# Patient Record
Sex: MALE | Race: WHITE | NOT HISPANIC OR LATINO | Employment: OTHER | URBAN - METROPOLITAN AREA
[De-identification: names, ages, dates, MRNs, and addresses within clinical notes are randomized per-mention and may not be internally consistent; named-entity substitution may affect disease eponyms.]

---

## 2017-01-02 ENCOUNTER — MYC REFILL (OUTPATIENT)
Dept: FAMILY MEDICINE | Facility: CLINIC | Age: 42
End: 2017-01-02

## 2017-01-02 ENCOUNTER — OFFICE VISIT (OUTPATIENT)
Dept: FAMILY MEDICINE | Facility: CLINIC | Age: 42
End: 2017-01-02
Payer: COMMERCIAL

## 2017-01-02 VITALS
TEMPERATURE: 97.7 F | DIASTOLIC BLOOD PRESSURE: 80 MMHG | WEIGHT: 293.8 LBS | HEART RATE: 70 BPM | HEIGHT: 77 IN | RESPIRATION RATE: 16 BRPM | SYSTOLIC BLOOD PRESSURE: 116 MMHG | OXYGEN SATURATION: 100 % | BODY MASS INDEX: 34.69 KG/M2

## 2017-01-02 DIAGNOSIS — E78.2 MIXED HYPERLIPIDEMIA: ICD-10-CM

## 2017-01-02 DIAGNOSIS — E78.5 HYPERLIPIDEMIA LDL GOAL <100: ICD-10-CM

## 2017-01-02 DIAGNOSIS — K57.32 DIVERTICULITIS OF COLON: Primary | ICD-10-CM

## 2017-01-02 DIAGNOSIS — J01.00 ACUTE NON-RECURRENT MAXILLARY SINUSITIS: ICD-10-CM

## 2017-01-02 DIAGNOSIS — R11.0 NAUSEA: ICD-10-CM

## 2017-01-02 PROCEDURE — 99214 OFFICE O/P EST MOD 30 MIN: CPT | Performed by: FAMILY MEDICINE

## 2017-01-02 RX ORDER — ATORVASTATIN CALCIUM 40 MG/1
40 TABLET, FILM COATED ORAL DAILY
Qty: 90 TABLET | Refills: 1 | Status: CANCELLED | OUTPATIENT
Start: 2017-01-02

## 2017-01-02 RX ORDER — FENOFIBRATE 145 MG/1
145 TABLET, COATED ORAL DAILY
Qty: 90 TABLET | Refills: 1 | Status: SHIPPED | OUTPATIENT
Start: 2017-01-02 | End: 2017-07-07

## 2017-01-02 RX ORDER — LEVOFLOXACIN 750 MG/1
750 TABLET, FILM COATED ORAL DAILY
Qty: 10 TABLET | Refills: 0 | Status: SHIPPED | OUTPATIENT
Start: 2017-01-02 | End: 2017-02-02

## 2017-01-02 RX ORDER — ATORVASTATIN CALCIUM 40 MG/1
40 TABLET, FILM COATED ORAL DAILY
Qty: 90 TABLET | Refills: 1 | Status: SHIPPED | OUTPATIENT
Start: 2017-01-02 | End: 2017-07-27

## 2017-01-02 RX ORDER — FENOFIBRATE 145 MG/1
145 TABLET, COATED ORAL DAILY
Qty: 30 TABLET | Refills: 3 | Status: CANCELLED | OUTPATIENT
Start: 2017-01-02

## 2017-01-02 RX ORDER — ONDANSETRON 8 MG/1
TABLET, FILM COATED ORAL
Qty: 20 TABLET | Refills: 5 | Status: SHIPPED | OUTPATIENT
Start: 2017-01-02 | End: 2018-01-11

## 2017-01-02 RX ORDER — METRONIDAZOLE 500 MG/1
500 TABLET ORAL 2 TIMES DAILY
Qty: 20 TABLET | Refills: 0 | Status: SHIPPED | OUTPATIENT
Start: 2017-01-02 | End: 2017-02-02

## 2017-01-02 ASSESSMENT — PAIN SCALES - GENERAL: PAINLEVEL: MODERATE PAIN (4)

## 2017-01-02 NOTE — NURSING NOTE
"Chief Complaint   Patient presents with     Sinus Problem     Abdominal Pain     LL quad       Initial /80 mmHg  Pulse 70  Temp(Src) 97.7  F (36.5  C) (Oral)  Resp 16  Ht 1.956 m (6' 5\")  Wt 133.267 kg (293 lb 12.8 oz)  BMI 34.83 kg/m2  SpO2 100% Estimated body mass index is 34.83 kg/(m^2) as calculated from the following:    Height as of this encounter: 1.956 m (6' 5\").    Weight as of this encounter: 133.267 kg (293 lb 12.8 oz).  BP completed using cuff size: tom Laboy MA      "

## 2017-01-02 NOTE — PROGRESS NOTES
SUBJECTIVE:                                                    Joel Pineda is a 41 year old male who presents to clinic today for the following health issues:      ABDOMINAL PAIN     Onset: 5-6 days     Description:   Character: Stabbing and Cramping  Location: left lower quadrant  Radiation: None    Intensity: 4/10    Progression of Symptoms:  worsening    Accompanying Signs & Symptoms:  Fever/Chills?: YES - chills, sweats  Gas/Bloating: YES- gas  Nausea: YES  Vomitting: no   Diarrhea?: YES- loose stools  Constipation:no  - did have a couple episodes  Dysuria or Hematuria: no    History:   Trauma: no   Previous similar pain: YES   Previous tests done: x-ray, CT and Colonoscopy    Precipitating factors:   Does the pain change with:     Food: no      BM: no     Urination: no     Alleviating factors:  Oxycodone, Tylenol    Therapies Tried and outcome: see above - helped    LMP:  not applicable     Acute Illness   Acute illness concerns: sinus  Onset: 3 days     Fever: no    Chills/Sweats: YES    Headache (location?): YES    Sinus Pressure:YES    Conjunctivitis:  no    Ear Pain: YES- popping, bilateral    Rhinorrhea: YES    Congestion: YES    Sore Throat: no     Cough: YES - slight    Wheeze: YES    Decreased Appetite: no    Nausea: YES    Vomiting: no    Diarrhea:  YES    Dysuria/Freq.: no    Fatigue/Achiness: YES    Sick/Strep Exposure: YES- sister     Therapies Tried and outcome: Mucinex, nasal spray    SUBJECTIVE:  Here today with symptoms as above. Has a history of recurrent diverticulitis and thinks that has flared up. He's having some LLQ achiness and some slight chills.  Stools have been normal. Appetite has been normal. But in the last few days he is also come down with some sinus symptoms including pressure and thick postnasal drip. In the past we've used Augmentin on both sinus and diverticular issues with the last couple times he has taken it is felt a bit like his throat is tightening.  No hives. No  "true and if lactic reaction. Also needs refills of some baseline medications. Is scheduled for lab tests tomorrow    Review of systems otherwise negative.  Past medical, family, and social history reviewed and updated in chart.    OBJECTIVE:  /80 mmHg  Pulse 70  Temp(Src) 97.7  F (36.5  C) (Oral)  Resp 16  Ht 1.956 m (6' 5\")  Wt 133.267 kg (293 lb 12.8 oz)  BMI 34.83 kg/m2  SpO2 100%  Alert, pleasant, upbeat, and in no apparent discomfort.  Ears normal. Throat and pharynx normal. Neck supple. No adenopathy or masses in the neck or supraclavicular regions. Sinuses non tender.  S1 and S2 normal, no murmurs, clicks, gallops or rubs. Regular rate and rhythm. Chest is clear; no wheezes or rales. No edema or JVD.  Abdomen - soft throughout with a little bit of tenderness but no significant rebound the left lower quadrant  Past labs reviewed with the patient.     ASSESSMENT / PLAN:  (K57.32) Diverticulitis of colon  (primary encounter diagnosis)  Comment: Discussed mechanism of action of the proposed medication, as well as potential effects, both good and bad.  Patient expressed understanding and agreed with treatment. He is willing to try the Levaquin. Has had some dizziness and muscle aches in the past but does not sound like a true allergy  Plan: levofloxacin (LEVAQUIN) 750 MG tablet,         metroNIDAZOLE (FLAGYL) 500 MG tablet            (J01.00) Acute non-recurrent maxillary sinusitis  Comment: As above  Plan: levofloxacin (LEVAQUIN) 750 MG tablet            (E78.2) Mixed hyperlipidemia  Comment: refilled   Plan: fenofibrate 145 MG tablet, atorvastatin         (LIPITOR) 40 MG tablet, Comprehensive metabolic        panel, Lipid panel reflex to direct LDL            (R11.0) Nausea  Comment: refilled   Plan: ondansetron (ZOFRAN) 8 MG tablet            Follow up based on results  JA Lyles MD    (Chart documentation completed in part with Dragon voice-recognition software.  Even though reviewed " some grammatical, spelling, and word errors may remain.)

## 2017-01-02 NOTE — TELEPHONE ENCOUNTER
Message from Kal:  Cheryl Johnson MA Mon Jan 2, 2017 11:30 AM        ----- Message -----   From: Joel Pineda   Sent: 1/2/2017 11:25 AM   To: Mg Raji [place]  Subject: Medication Renewal Request     Original authorizing provider: MD Joel Dickey would like a refill of the following medications:  atorvastatin (LIPITOR) 40 MG tablet [Ksenia Lyles MD]  fenofibrate 145 MG tablet [Ksenia Lyles MD]    Preferred pharmacy: [id number] [address] [address]    Comment:  Good morning. While the two selected meds are running low, I do not know if we should wait to refill until after the scheduled blood test that will check my triglycerides, etc. Note I do see Dr. Lyles today @ 3:20pm wherein we could discuss further. As an aside, my insurance coverage changed to [employer] on 01/01/17...I will provide the new insurance info to [facility], the preferred pharmacy.

## 2017-01-03 ENCOUNTER — TELEPHONE (OUTPATIENT)
Dept: FAMILY MEDICINE | Facility: CLINIC | Age: 42
End: 2017-01-03

## 2017-01-03 ENCOUNTER — CARE COORDINATION (OUTPATIENT)
Dept: CARE COORDINATION | Facility: CLINIC | Age: 42
End: 2017-01-03

## 2017-01-03 DIAGNOSIS — E78.2 MIXED HYPERLIPIDEMIA: ICD-10-CM

## 2017-01-03 LAB
ALBUMIN SERPL-MCNC: 4.2 G/DL (ref 3.4–5)
ALP SERPL-CCNC: 66 U/L (ref 40–150)
ALT SERPL W P-5'-P-CCNC: 27 U/L (ref 0–70)
ANION GAP SERPL CALCULATED.3IONS-SCNC: 7 MMOL/L (ref 3–14)
AST SERPL W P-5'-P-CCNC: 18 U/L (ref 0–45)
BILIRUB SERPL-MCNC: 0.3 MG/DL (ref 0.2–1.3)
BUN SERPL-MCNC: 13 MG/DL (ref 7–30)
CALCIUM SERPL-MCNC: 9.2 MG/DL (ref 8.5–10.1)
CHLORIDE SERPL-SCNC: 105 MMOL/L (ref 94–109)
CHOLEST SERPL-MCNC: 153 MG/DL
CO2 SERPL-SCNC: 27 MMOL/L (ref 20–32)
CREAT SERPL-MCNC: 1.32 MG/DL (ref 0.66–1.25)
GFR SERPL CREATININE-BSD FRML MDRD: 59 ML/MIN/1.7M2
GLUCOSE SERPL-MCNC: 99 MG/DL (ref 70–99)
HDLC SERPL-MCNC: 31 MG/DL
LDLC SERPL CALC-MCNC: 44 MG/DL
NONHDLC SERPL-MCNC: 122 MG/DL
POTASSIUM SERPL-SCNC: 4 MMOL/L (ref 3.4–5.3)
PROT SERPL-MCNC: 7.2 G/DL (ref 6.8–8.8)
SODIUM SERPL-SCNC: 139 MMOL/L (ref 133–144)
TRIGL SERPL-MCNC: 388 MG/DL

## 2017-01-03 PROCEDURE — 80053 COMPREHEN METABOLIC PANEL: CPT | Performed by: FAMILY MEDICINE

## 2017-01-03 PROCEDURE — 36415 COLL VENOUS BLD VENIPUNCTURE: CPT | Performed by: FAMILY MEDICINE

## 2017-01-03 PROCEDURE — 80061 LIPID PANEL: CPT | Performed by: FAMILY MEDICINE

## 2017-01-03 NOTE — TELEPHONE ENCOUNTER
levothyroxine     Last Written Prescription Date: 9/27/16  Last Quantity: 90, # refills: 2  Last Office Visit with [facility], [facility] or [facility] prescribing provider: 1/2/17          TSH   Date Value Ref Range Status   11/01/2016 2.76 0.40 - 4.00 mU/L Final     Routing refill request to provider for review/approval because:  Labs not current:  TSH  Diane Tuttle RN

## 2017-01-03 NOTE — TELEPHONE ENCOUNTER
Reason for Call:  Patient had 1/2 appointment with Dr. Lyles and was prescribed metroNIDAZOLE (FLAGYL) 500 MG tablet  And levothyroxine (SYNTHROID, LEVOTHROID) 75 MCG tablet  Since starting medications he is constipated.    Detailed comments: Please call patient to advise.     Phone Number Patient can be reached at: Home number on file [phone] (home)    Best Time: anytime    Can we leave a detailed message on this number? YES     Thank you,    Call taken on 1/3/2017 at 3:33 PM by Magali Borrego

## 2017-01-03 NOTE — PROGRESS NOTES
1/3/2017 Clinic Care Coordination Contact  [facility]/Voicemail- Social Work follow up    Referral Source: PCP  Clinical Data: Care Coordinator Outreach  Outreach attempted x 1 as a follow up to our last conversation.  Left message on voicemail with call back information and requested return call.    Plan: Care Coordinator will try to reach patient again in 7-10  business days.    Juliette Cavazos [facility]  , Clinic Care Coordination  Clinics:  [place], [place]  [phone]   1/3/2017   9:05 AM

## 2017-01-09 ENCOUNTER — TELEPHONE (OUTPATIENT)
Dept: PHARMACY | Facility: CLINIC | Age: 42
End: 2017-01-09

## 2017-01-09 DIAGNOSIS — I10 ESSENTIAL HYPERTENSION WITH GOAL BLOOD PRESSURE LESS THAN 140/90: Primary | Chronic | ICD-10-CM

## 2017-01-09 NOTE — TELEPHONE ENCOUNTER
Patient called to discuss his kidney function tests as his creatnine was elevated. Patient was fasting at the time of his lab work and he has not had any issues in the past with his kidney function. Recommend patient have kidney function retested in the next 4-6 weeks not fasting and evaluate kidney function at that time. Patient was agreeable with the plan. Orders placed for future BMP.  Radha Mcdonald, Pharm.D, BCACP  Medication Therapy Management Pharmacist

## 2017-01-19 ENCOUNTER — MYC REFILL (OUTPATIENT)
Dept: FAMILY MEDICINE | Facility: CLINIC | Age: 42
End: 2017-01-19

## 2017-01-19 DIAGNOSIS — I10 ESSENTIAL HYPERTENSION, BENIGN: ICD-10-CM

## 2017-01-19 DIAGNOSIS — I10 ESSENTIAL HYPERTENSION WITH GOAL BLOOD PRESSURE LESS THAN 140/90: Primary | Chronic | ICD-10-CM

## 2017-01-19 DIAGNOSIS — M54.50 CHRONIC MIDLINE LOW BACK PAIN WITHOUT SCIATICA: Primary | ICD-10-CM

## 2017-01-19 DIAGNOSIS — G89.29 CHRONIC MIDLINE LOW BACK PAIN WITHOUT SCIATICA: Primary | ICD-10-CM

## 2017-01-19 RX ORDER — ATENOLOL 50 MG/1
TABLET ORAL
Qty: 90 TABLET | Refills: 1 | Status: SHIPPED | OUTPATIENT
Start: 2017-01-19 | End: 2017-07-28

## 2017-01-19 RX ORDER — PREGABALIN 150 MG/1
150 CAPSULE ORAL 2 TIMES DAILY
Qty: 180 CAPSULE | Refills: 1 | Status: SHIPPED | OUTPATIENT
Start: 2017-01-19 | End: 2017-04-17

## 2017-01-19 RX ORDER — ATENOLOL 50 MG/1
50 TABLET ORAL DAILY
Qty: 90 TABLET | Refills: 1 | OUTPATIENT
Start: 2017-01-19

## 2017-01-19 NOTE — TELEPHONE ENCOUNTER
atenolol (TENORMIN) 50 MG tablet      Last Written Prescription Date: 7/20/16  Last Fill Quantity: 90, # refills: 1  Last Office Visit with [facility], [facility] or [facility] prescribing provider: 1/2/17       POTASSIUM   Date Value Ref Range Status   01/03/2017 4.0 3.4 - 5.3 mmol/L Final     CREATININE   Date Value Ref Range Status   01/03/2017 1.32* 0.66 - 1.25 mg/dL Final     BP Readings from Last 3 Encounters:   01/02/17 116/80   12/07/16 115/79   11/23/16 138/85

## 2017-01-19 NOTE — TELEPHONE ENCOUNTER
Atenolol  Prescription approved per [facility] Refill Protocol.  Keyana Mercedes RN

## 2017-01-19 NOTE — TELEPHONE ENCOUNTER
Message from [place]:  Cheryl Johnson MA Thu Jan 19, 2017 10:36 AM        ----- Message -----   From: Joel Pineda   Sent: 1/19/2017 10:30 AM   To: Mg Raji [place]  Subject: Medication Renewal Request     Original authorizing provider: MD Joel Dickey would like a refill of the following medications:  atenolol (TENORMIN) 50 MG tablet [Ksenia Lyles MD]    Preferred pharmacy: [facility] [id number] [facility] [address] [address]    Comment:  Good morning. Just a heads-up that I'm out of refills on Atenolol 50mg and Lyrica 150mg (I've contacted [facility], too). As a reminder, I've decreased dosage of Lyrica to 150mg BID (please prescribe a qty 60 capsules). I am no longer using the 225mg capsules. Thank you.    Medication renewals requested in this message routed to other providers:  pregabalin (LYRICA) 150 MG capsule [Faith Camacho PA-C]

## 2017-01-19 NOTE — TELEPHONE ENCOUNTER
pregabalin      Last Written Prescription Date: 11/25/16  Last Fill Quantity: 90,  # refills: 0   Last Office Visit with [facility], [facility] or [facility] prescribing provider: 1/2/17    Routing refill request to provider for review/approval because:  Drug not on the [facility] refill protocol   Keyana Mercedes RN

## 2017-01-19 NOTE — TELEPHONE ENCOUNTER
Prescription approved per [facility] Refill Protocol.  Keyana Mercedes RN

## 2017-01-20 ENCOUNTER — CARE COORDINATION (OUTPATIENT)
Dept: CARE COORDINATION | Facility: CLINIC | Age: 42
End: 2017-01-20

## 2017-01-20 ENCOUNTER — TELEPHONE (OUTPATIENT)
Dept: PHARMACY | Facility: OTHER | Age: 42
End: 2017-01-20

## 2017-01-20 NOTE — Clinical Note
Health Care Home - Access Care Plan    About Me  Patient Name:  Joel Lee    YOB: 1975  Age:                            41 year old   Gianna MRN:         [id number] Telephone Information:     Home Phone [phone]   Mobile [phone]       Address:    [address] [place] [address] Email address:  [email or web address]      Emergency Contact(s)  Name Relationship Lgl Grd Work Phone Home Phone Mobile Phone   1. HARJINDER HANEY Mother No  [phone]   2. FARIBA HANEY parent No  [phone]   3. PEREZ LEE Father No  [phone]             Health Maintenance:      My Access Plan  Medical Emergency 911   Questions or concerns during clinic hours Primary Clinic Line, I will call the clinic directly: Primary Clinic: [facility]- [phone]   24 Hour Appointment Line [phone] or  [phone] [place] ([phone])  (toll free)   24 Hour Nurse Line [phone] (toll free)   Questions or concerns outside clinic hours 24 Hour Appointment Line, I will call the after-hours on-call line:   [facility] [phone] or [phone]) (toll-free)   Preferred Urgent Care Preferred Urgent Care: Other (Kendy Quach )   Preferred Hospital Preferred Hospital: [facility]  [phone]   Preferred Pharmacy [facility] [address] [address] [employer] [facility] [place] [facility] [address] [facility]     Behavioral Health Crisis Line Crisis Connection, [phone] or 911     My Care Team Members  Patient Care Team       Relationship Specialty Notifications Start End    Ksenia Lyles MD PCP - General Family Practice  6/7/16     Phone: [phone] Fax: [phone]         [facility] [address] [facility] [address]    Elaine Diaz MD Referring Physician Pain Clinic  8/10/15     Comment:  Referring to Ortho    Phone: [phone] Fax: [phone]          PAIN  MANAGEMENT [address] [facility] [address]    Evan Maldonado MD MD Orthopedics  8/10/15     Phone: [phone] Fax: [phone]          PHYSICIANS [address] [facility] [address]    Houston Koroma MD MD Internal Medicine  10/1/15     Phone: [phone] Fax: [phone]         [facility] [address] [facility] [address]    Grant Angel MD MD Psychiatry  10/1/15     Phone: [phone] Fax: [phone]         PSYCHIATRIC RECOVERY [address] [facility] [address] [facility] [address]    Radha Mcdonald, [facility] Pharmacist Pharmacy  10/1/15     Phone: [phone] Fax: [phone]         [facility] [address] [place] [address]    Nish Ang MD Referring Physician Neurosurgery  10/6/15     Comment:  referring to neuropsych    Phone: [phone] Fax: [phone]         [facility] [address] [facility] [id number] [facility] [address]    Judy Garcia, LP   Psychology  12/29/15     Phone: [phone] Fax: [phone]         [facility] [address]  [facility] [address]    Checo Kiran, RN Nurse Coordinator Nurse  4/6/16     Phone: [phone]         Dalila Cavazos LSW    6/23/16     Comment:  Juliette [phone]    Liliana MATHUR Psych Psychiatrist   8/24/16     Comment:  VERNA Cortez     Phone: [phone]         Dr Daja CHING Therapist   8/24/16     Comment:  VERNA [place]ez    Phone: [phone]             My Medical and Care Information  Problem List   Patient Active Problem List   Diagnosis     Major depressive disorder, recurrent episode (H)     Intermittent asthma     Chronic back pain     Hyperlipidemia LDL goal <130     Hypertension goal BP (blood pressure) < 140/90     Chronic nonallergic rhinitis     History of diverticulitis of colon     Obesity (BMI 30-39.9)     Health Care Home     PE (pulmonary embolism)     SI (sacroiliac) joint dysfunction      Diverticulosis     GERD (gastroesophageal reflux disease)     Anxiety     Chronic pain     LLOYD (obstructive sleep apnea)- mild (AHI 11)     Intracranial arachnoid cyst     Facet arthritis of cervical region (H)     Acquired hypothyroidism     Bipolar 2 disorder (H)     Allergic rhinitis, unspecified allergic rhinitis type     Chronic midline low back pain without sciatica     Irritable bowel syndrome with diarrhea     B12 deficiency     Impaired glucose tolerance     Essential hypertension with goal blood pressure less than 140/90     Pain in joint involving ankle and foot, left     Psychophysiological insomnia     Mixed hyperlipidemia      Current Medications and Allergies:  See printed Medication Report

## 2017-01-20 NOTE — PROGRESS NOTES
1/20/2017 Clinic Care Coordination Contact  [facility]/Voicemail - Social Work    Referral Source: PCP  Clinical Data: Care Coordinator Outreach  Outreach attempted x 2 as a follow up .  Left message on [employer] with call back information and requested return call.  Plan: Care Coordinator will mail out care coordination Unable to Contact  letter with care coordinator  contact information and explanation of care coordination services. Care Coordinator will do no further outreaches at this time. Will plan to close f no response by   2/15/17.   Juliette Cavazos [facility]  , Clinic Care Coordination  Clinics:  [place],  [place], [place]  [phone]   1/20/2017   3:53 PM

## 2017-01-20 NOTE — Clinical Note
[address] [id number] [phone]    January 20, 2017      Joel Pineda  [address] [place] [address]    Dear Joel,  I am the Clinic Care Coordinator that works with your primary care provider's clinic. I recently tried to call and was unable to reach you. Below is a description of what Clinic Care Coordination is and how I can further assist you.     The Clinic Care Coordinator role is a Registered Nurse and/or  who understands the health care system. The goal of Clinic Care Coordination is to help you manage your health and improve access to the [facility] in the most efficient manner.  The Registered Nurse can assist you in meeting your health care goals by providing education, coordinating services, and strengthening the communication among your providers. The  can assist you with financial, behavioral, psychosocial, and chemical dependency and counseling/psychiatric resources.    Please feel free to keep this letter and contact information to contact me at [phone] with any further questions or concerns that may arise. We at [place] are focused on providing you with the highest-quality healthcare experience possible and that all starts with you.       Sincerely,       Juliette Cavazos [facility] Services  , Clinic Care Coordination  Clinics:  [place],  [place], [place], [place]  [phone]   1/20/2017   3:56 PM    Enclosed: I have enclosed a copy of a 24 Hour Access Plan. This has helpful phone numbers for you to call when needed. Please keep this in an easy to access place to use as needed.

## 2017-01-20 NOTE — TELEPHONE ENCOUNTER
MTM referral from: Cx report    MTM referral outreach attempt #1 on January 20, 2017 at 12:54 PM      Outcome: Left Message    Nury Rodríguez MTM Coordinator

## 2017-01-24 ENCOUNTER — TELEPHONE (OUTPATIENT)
Dept: PHARMACY | Facility: CLINIC | Age: 42
End: 2017-01-24

## 2017-01-24 ENCOUNTER — CARE COORDINATION (OUTPATIENT)
Dept: CARE COORDINATION | Facility: CLINIC | Age: 42
End: 2017-01-24

## 2017-01-24 NOTE — TELEPHONE ENCOUNTER
Patient called to ask about DDI between Geodon and seroquel. His psychiatrist is making changes to his medications because his hallucinations have been increasing and because Vraylar and Emsam are >$300.  Patient has been titrating off of Vraylar; currently taking 3mg daily, down from 4.5mg daily. Patient is off of Emsam for about 1 week. Then starting (Vraylar dose will be at 1.5mg daily) Geodon 40mg bid and on Day 2 increasing to 80mg bid. In another week patient will start fluoxetine 10mg daily. Seroquel XR 100mg at night (dose decrease from 200mg), and continue seroquel 25mg as needed for anxiety. Patient stopped seroquel on his own and is having some sweating so is using 25mg as needed to get through these episodes of withdrawal. Patient stopped the seroquel on his own because he is concerned about the drug interaction between seroquel and geodon causing QTc prolongation.  The use of geodon and seroquel is contraindicated. Advised patient to call psychiatrist to make them aware that he stopped his seroquel.     Patient did have a job interview yesterday and this lifted his spirits. So far he is tolerating the medication changes with no worsening of mood or hallucinations.    F/U scheduled for Feb 21st.  Radha Mcdonald, Martha.D, [facility]  Medication Therapy Management Pharmacist/

## 2017-01-24 NOTE — PROGRESS NOTES
"1/24/2017 Clinic Care Coordination Contact  Social Work  Received a return call from pt.  Terrance was in good spirits today.  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 This is exciting and anxiety producing. Discussed using relaxation and calming techniques, he already knows.   Terrance shared, he came down with shingles and was not able to complete the Partial Day Program at [facility]. He attended only one day.   His hallucinations have increased, so his medications are being changed.  He works with Toni Hudson through [facility] [employer].    He sees her every 3 weeks.  He shared his hallucinations are more like hearing noises -\"not anything bad.\"  They are upsetting during the night. He can handle the day time. No new concerns or needs identified at this time. Pt is aware of amount he can earn ,while on disability.     Plan: Pt hopes to start a new job  SW will follow in 5-6 weeks or as needed for ongoing support..     Juliette Cavazos [facility]  , Clinic Care Coordination  Clinics:  [place], [place]  [phone]   1/24/2017   9:26 AM        "

## 2017-01-27 ENCOUNTER — MYC REFILL (OUTPATIENT)
Dept: FAMILY MEDICINE | Facility: CLINIC | Age: 42
End: 2017-01-27

## 2017-01-27 DIAGNOSIS — M47.819 FACET ARTHROPATHY: ICD-10-CM

## 2017-01-27 RX ORDER — OXYCODONE HYDROCHLORIDE 5 MG/1
5-10 TABLET ORAL EVERY 6 HOURS PRN
Qty: 35 TABLET | Refills: 0 | Status: SHIPPED | OUTPATIENT
Start: 2017-01-27 | End: 2017-06-02

## 2017-01-27 NOTE — TELEPHONE ENCOUNTER
ROXICODONE      Last Written Prescription Date:  12/13/16  Last Fill Quantity: 35,   # refills: 0  Last [facility] with [facility] or  Health prescribing provider: 1/2/17 Dr. Lyles  Future Office visit:       Routing refill request to provider for review/approval because:  Drug not on the [facility], [facility] or  Health refill protocol or controlled substance    LINDSEY Reilly (R)        Message from [facility]:  Cheryl Johnson MA Fri Jan 27, 2017 2:14 PM        ----- Message -----  From: Joel Pineda  Sent: 1/27/2017 2:13 PM  To:   [place]  Subject: Medication Renewal Request     Original authorizing provider: MD Joel Dickey would like a refill of the following medications:  oxyCODONE (ROXICODONE) 5 MG IR tablet [Ksenia Lyles MD]    Preferred pharmacy: [facility] -     Comment:  Dr. Lyles, Good afternoon. I have seven tabs remaining. When time permits, please write a new paper Rx. Thank you.

## 2017-01-31 ENCOUNTER — TRANSFERRED RECORDS (OUTPATIENT)
Dept: HEALTH INFORMATION MANAGEMENT | Facility: CLINIC | Age: 42
End: 2017-01-31

## 2017-02-01 ENCOUNTER — TELEPHONE (OUTPATIENT)
Dept: FAMILY MEDICINE | Facility: CLINIC | Age: 42
End: 2017-02-01

## 2017-02-01 NOTE — TELEPHONE ENCOUNTER
Fax received from [facility] for ED visit 1/31/17. DX chest pain. Patient was r/o for ACS, MI, PE, dissection and ruptured peptic ulcer and D/C home.  BA Provider notes on fax to call patient to schedule follow up appt.

## 2017-02-01 NOTE — TELEPHONE ENCOUNTER
Called patient for ER follow up. Patient states he had another episode of the same chest pain today, now resolved.   Advised to be seen tomorrow in clinic for ER follow up (as opposed to waiting for PCP to return to clinic next week).      Next 5 appointments (look out 90 days)     Feb 02, 2017  2:40 PM   [facility]    [address] [place] [address] [phone]                Advised patient to call the clinic back right away for any new, worsening or concerning symptoms as needed prior to appt tomorrow.  Patient verbalized understanding of plan.    Alvaro Mccracken RN

## 2017-02-02 ENCOUNTER — OFFICE VISIT (OUTPATIENT)
Dept: FAMILY MEDICINE | Facility: CLINIC | Age: 42
End: 2017-02-02
Payer: COMMERCIAL

## 2017-02-02 VITALS
TEMPERATURE: 97.9 F | HEART RATE: 61 BPM | OXYGEN SATURATION: 99 % | BODY MASS INDEX: 35.2 KG/M2 | SYSTOLIC BLOOD PRESSURE: 112 MMHG | DIASTOLIC BLOOD PRESSURE: 84 MMHG | WEIGHT: 296.9 LBS

## 2017-02-02 DIAGNOSIS — R07.89 ATYPICAL CHEST PAIN: Primary | ICD-10-CM

## 2017-02-02 PROCEDURE — 99214 OFFICE O/P EST MOD 30 MIN: CPT | Performed by: FAMILY MEDICINE

## 2017-02-02 RX ORDER — FLUOXETINE 10 MG/1
10 CAPSULE ORAL EVERY MORNING
COMMUNITY
Start: 2017-01-18 | End: 2017-05-15 | Stop reason: DRUGHIGH

## 2017-02-02 RX ORDER — ZIPRASIDONE HYDROCHLORIDE 80 MG/1
80 CAPSULE ORAL 2 TIMES DAILY
COMMUNITY
Start: 2017-01-18 | End: 2017-10-24 | Stop reason: DRUGHIGH

## 2017-02-02 ASSESSMENT — PATIENT HEALTH QUESTIONNAIRE - PHQ9: 5. POOR APPETITE OR OVEREATING: MORE THAN HALF THE DAYS

## 2017-02-02 ASSESSMENT — ANXIETY QUESTIONNAIRES
3. WORRYING TOO MUCH ABOUT DIFFERENT THINGS: MORE THAN HALF THE DAYS
5. BEING SO RESTLESS THAT IT IS HARD TO SIT STILL: MORE THAN HALF THE DAYS
1. FEELING NERVOUS, ANXIOUS, OR ON EDGE: NEARLY EVERY DAY
GAD7 TOTAL SCORE: 15
7. FEELING AFRAID AS IF SOMETHING AWFUL MIGHT HAPPEN: NEARLY EVERY DAY
2. NOT BEING ABLE TO STOP OR CONTROL WORRYING: MORE THAN HALF THE DAYS
6. BECOMING EASILY ANNOYED OR IRRITABLE: SEVERAL DAYS
IF YOU CHECKED OFF ANY PROBLEMS ON THIS QUESTIONNAIRE, HOW DIFFICULT HAVE THESE PROBLEMS MADE IT FOR YOU TO DO YOUR WORK, TAKE CARE OF THINGS AT HOME, OR GET ALONG WITH OTHER PEOPLE: VERY DIFFICULT

## 2017-02-02 NOTE — PATIENT INSTRUCTIONS
Trial of Combivent respimat with spacer and follow up with pulmonary tomorrow as planned.    I would recommend you journal with recurrent symptoms and look for patterns or trigger to symptoms.    Continue current medications.

## 2017-02-02 NOTE — MR AVS SNAPSHOT
After Visit Summary   2/2/2017    Joel Pineda    MRN: [id number]           Patient Information     Date Of Birth          1975        Visit Information        Provider Department      2/2/2017 2:40 PM Felipa Renteria MD [facility]        Care Instructions    Trial of Combivent respimat with spacer and follow up with pulmonary tomorrow as planned.    I would recommend you journal with recurrent symptoms and look for patterns or trigger to symptoms.    Continue current medications.            Follow-ups after your visit        Your next 10 appointments already scheduled     Feb 21, 2017  4:00 PM   TELEMEDICINE with Radha Mcdonald [facility] ([facility])    [address] [address] [place] [address] [address]  [address] [place] [address] [phone]           Note: this is not an onsite visit; there is no need to come to the facility.              Who to contact     If you have questions or need follow up information about today's clinic visit or your schedule please contact [facility] directly at [phone].  Normal or non-critical lab and imaging results will be communicated to you by AllDigitalhart, letter or phone within 4 business days after the clinic has received the results. If you do not hear from us within 7 days, please contact the clinic through [employer] or phone. If you have a critical or abnormal lab result, we will notify you by phone as soon as possible.  Submit refill requests through Orthogem or call your pharmacy and they will forward the refill request to us. Please allow 3 business days for your refill to be completed.          Additional Information About Your Visit        AllDigitalhart Information     Orthogem gives you secure access to your electronic health record. If you see a primary care provider, you can also send messages to your care team and make appointments. If you have questions, please call your primary care  clinic.  If you do not have a primary care provider, please call [phone] and they will assist you.        Care EveryWhere ID     This is your Care EveryWhere ID. This could be used by other organizations to access your [facility] medical records  [id number]        Your Vitals Were     Pulse Temperature Pulse Oximetry             61 97.9  F (36.6  C) (Oral) 99%          Blood Pressure from Last 3 Encounters:   02/02/17 112/84   01/02/17 116/80   12/07/16 115/79    Weight from Last 3 Encounters:   02/02/17 134.673 kg (296 lb 14.4 oz)   01/02/17 133.267 kg (293 lb 12.8 oz)   12/07/16 133.811 kg (295 lb)              Today, you had the following     No orders found for display         Today's Medication Changes          These changes are accurate as of: 2/2/17  3:31 PM.  If you have any questions, ask your nurse or doctor.               These medicines have changed or have updated prescriptions.        Dose/Directions    cetirizine 10 MG tablet   Commonly known as:  zyrTEC   This may have changed:  when to take this   Used for:  Cough        Dose:  10 mg   Take 1 tablet (10 mg) by mouth At Bedtime   Quantity:  30 tablet   Refills:  11                Primary Care Provider Office Phone # Fax #    Ksenia Shady Lyles [phone]       [address] [address]        Goals        Patient-Stated    I will continue to attend Psychiatry appointments for medication management, 1/14/2014     Goal Comments - Note edited  8/24/2016  4:04 PM by Dalila Cavazos LSW    As of today's date 8/24/2016 goal is met at 51 - 75%.   Goal Status:  Ongoing Sees  Therapist every other week and Psych PA every 3 weeks.  As of today's date 5/25/2016 goal is met at 51 - 75%.   Goal Status:  Showing progress-  Meeting with Psych PA for second time tomorrow  To review meds As of today's date 4/11/2016 goal is met at 51 - 75%.   Goal Status:  Showing progress  As of today's  date 1/14/2014 goal is met at 51 - 75%.   Goal Status:  Active        I will schedule therapy with new counselor, 1/14/2014     Goal Comments - Note edited  5/25/2016  1:54 PM by Dalila Cavazos LSW    As of today's date 5/25/2016 goal is met at 76 - 100%.   Goal Status:  Ongoing New therapist and Psych PA on a regular basis  As of today's date 5/10/2016 goal is met at 76 - 100%.   Goal Status:  Ongoing met with new therapist [facility]  As of today's date 4/11/2016 goal is met at 51 - 75%.   Goal Status:  Ongoing meets with therapist regularly  As of today's date 1/14/2014 goal is met at 0 - 25%.   Goal Status:  Active        Psychosocial I need some help with cleaning     Goal Comments - Note edited  6/23/2016  1:57 PM by Dalila Cavazos LSW    As of today's date 6/23/2016 goal is met at 26 - 50%.   Goal Status:  Showing progress met with [facility]. Has not yet made a decision  As of today's date 5/25/2016 goal is met at 0 - 25%.   Goal Status:  Ongoing agency had to reschedule appt.  As of today's date 5/10/2016 goal is met at 0 - 25%.   Goal Status:  Active referral to [facility]        Thank you!     Thank you for choosing [facility]  for your care. Our goal is always to provide you with excellent care. Hearing back from our patients is one way we can continue to improve our services. Please take a few minutes to complete the written survey that you may receive in the mail after your visit with us. Thank you!             Your Updated Medication List - Protect others around you: Learn how to safely use, store and throw away your medicines at www.disposemymeds.org.          This list is accurate as of: 2/2/17  3:31 PM.  Always use your most recent med list.                   Brand Name Dispense Instructions for use    acetaminophen 500 MG Caps     60 capsule    Take 500 mg by mouth every 4 hours as needed       ascorbic acid 500 MG tablet    VITAMIN C     Take 500 mg by mouth three times a week  MWF       atenolol 50 MG tablet    TENORMIN    90 tablet    TAKE 1 TABLET BY MOUTH EVERY DAY       atorvastatin 40 MG tablet    LIPITOR    90 tablet    Take 1 tablet (40 mg) by mouth daily       BELSOMRA 15 MG tablet   Generic drug:  Suvorexant      Take 10 mg by mouth nightly as needed       budesonide 0.25 MG/2ML neb solution    PULMICORT     Irrigate sinus with 0.25mg of budesonide bid along with saline solution       calcium citrate + Tabs      250mg bid       CALMOL-4 76-10 % Supp     30 suppository    Place 1 suppository rectally daily as needed       celecoxib 200 MG capsule    celeBREX    180 capsule    TAKE 1 CAPSULE BY MOUTH TWICE DAILY AS NEEDED FORMODERATE PAIN       cetirizine 10 MG tablet    zyrTEC    30 tablet    Take 1 tablet (10 mg) by mouth At Bedtime       clonazePAM 1 MG tablet    klonoPIN    60 tablet    0.5 mg 2 times daily as needed       cyanocobalamin 1000 MCG/ML injection    VITAMIN B12    0.9 mL    Inject 1 mL (1,000 mcg) into the muscle every 30 days       dicyclomine 20 MG tablet    BENTYL    120 tablet    Take 1 tablet (20 mg) by mouth 4 times daily as needed       EPINEPHrine 0.3 MG/0.3ML injection     0.6 mL    Inject 0.3 mLs (0.3 mg) into the muscle once as needed for anaphylaxis       fenofibrate 145 MG tablet     90 tablet    Take 1 tablet (145 mg) by mouth daily       FLUoxetine 10 MG capsule    PROzac     Take 10 mg by mouth every morning       Ipratropium-Albuterol  MCG/ACT inhaler    COMBIVENT RESPIMAT     Inhale 1 puff into the lungs 3 times daily And as needed for bronchospasm       IRON SUPPLEMENT PO      Take 325 mg by mouth three times a week       levothyroxine 75 MCG tablet    SYNTHROID/LEVOTHROID    90 tablet    TAKE 1 TABLET BY MOUTH EVERY MORNING       lidocaine 5 % ointment    XYLOCAINE    250 g    Apply topically 3 times daily as needed for moderate pain Apply as directed       lithium 450 MG CR tablet    ESKALITH    75 tablet    Take 3 tablets (1,350 mg) by  mouth At Bedtime       metaxalone 800 MG tablet    SKELAXIN    90 tablet    TAKE ONE TABLET BY MOUTH THREE TIMES DAILY.       metFORMIN 500 MG 24 hr tablet    GLUCOPHAGE-XR    90 tablet    Take 1 tablet (500 mg) by mouth daily (with dinner)       montelukast 10 MG tablet    SINGULAIR    90 tablet    Take 1 tablet (10 mg) by mouth At Bedtime       omeprazole 20 MG tablet      Take 20 mg by mouth 2 times daily       ondansetron 8 MG tablet    ZOFRAN    20 tablet    TAKE 1 TABLET (8 MG) BY MOUTH EVERY 8 HOURS AS NEEDED FOR NAUSEA/VOMITING.       * order for DME     12 each    Equipment being ordered: 1 ml tuberculin syringes to be used for Vitamin B12 injections.       * order for DME     1 Units    SI-loc belt       * order for DME      Respironics REMSTAR 60 Series Auto CPAP 10-12 cm H2O, Wisp nasal mask w/a large cushion and a chinstrap       oxyCODONE 5 MG IR tablet    ROXICODONE    35 tablet    Take 1-2 tablets (5-10 mg) by mouth every 6 hours as needed for pain (maximum 4 tablet(s) per day) . For post-procedure pain- this replaces your regular short-acting script.       Potassium Gluconate 595 (99 K) MG Tabs      Take 1 tablet by mouth daily       pregabalin 150 MG capsule    LYRICA    180 capsule    Take 1 capsule (150 mg) by mouth 2 times daily       prochlorperazine 10 MG tablet    COMPAZINE    30 tablet    Take 1 tablet (10 mg) by mouth 3 times daily as needed for nausea       ramipril 5 MG capsule    ALTACE    90 capsule    Take 1 capsule (5 mg) by mouth daily       triamcinolone 0.1 % cream    KENALOG     Apply topically as needed for irritation       vitamin D3 95338 UNITS capsule    CHOLECALCIFEROL    24 capsule    Take one capsule every two weeks.       vitamin E 400 UNIT capsule     30 capsule    Take 1 capsule (400 Units) by mouth daily Take 1 tablet on Monday, Wednesday, and Friday.       zinc 50 MG Tabs      Take 1 tablet by mouth three times a week MWF       ziprasidone 80 MG capsule    GEODON      Take 80 mg by mouth 2 times daily       * Notice:  This list has 3 medication(s) that are the same as other medications prescribed for you. Read the directions carefully, and ask your doctor or other care provider to review them with you.

## 2017-02-02 NOTE — PROGRESS NOTES
SUBJECTIVE:                                                    Joel Pineda is a 41 year old male who presents to clinic today for the following health issues:      ED/UC Followup:    Facility:  [facility]  Date of visit: 1/31/17  330 pm - 9 pm  Reason for visit: Chest Pain - diagnosed as non-cardiac, notations indicate they thought related to chest wall.    Current Status: Pt states he is not having chest pains as of today   Left chest discomfort yesterday until 2 pm sharp stinging,  No radiation   Had shortness of breath the day before tried the combivent respimat - helped on the 30th, less benefit yesterday.  Hard to use this inhaler - does not always feel he gets the medication in well.  Never brought on with physical activity.  Rarely tender to touch.  During the pain episode did worsen with taking pain medication - swallowing process.  No sore throat   Swallowing recreated the pain on the 31st.    Did have cardiac evaluation in past with April 2014, [facility] care everywhere  Procedure: NM CARDIAC MPI STRESS TEST  Indication: Chest Pain    FINAL CONCLUSIONS  NORMAL exercise stress perfusion imaging study.  Excellent exercise tolerance, achieving 10.1 METs and 91.7% of max predicted heart rate.  Exercise did not appear to reproduce chest pain or presenting symptoms.  There was no ECG evidence diagnostic for of ischemia.  LV systolic function was normal without regional wall motion abnormalities.          Problem list and histories reviewed & adjusted, as indicated.  Additional history: as documented    Problem list, Medication list, Allergies, and Medical/Social/Surgical histories reviewed in [facility] and updated as appropriate.    ROS:  Constitutional, HEENT, cardiovascular, pulmonary, gi and gu systems are negative, except as otherwise noted.    OBJECTIVE:                                                    /84 mmHg  Pulse 61  Temp(Src) 97.9  F (36.6  C) (Oral)  Wt 134.673 kg (296 lb 14.4 oz)  SpO2  99%  Body mass index is 35.2 kg/(m^2).  GENERAL: alert, no distress, obese and fatigued  HENT: ear canals and TM's normal, nose and mouth without ulcers or lesions  NECK: no adenopathy, no asymmetry, masses, or scars and thyroid normal to palpation  RESP: lungs clear to auscultation - no rales, rhonchi or wheezes  CV: regular rate and rhythm, normal S1 S2, no S3 or S4, no murmur, click or rub, no peripheral edema and peripheral pulses strong  MS: no gross musculoskeletal defects noted, no edema.  No tenderness to palpation.           ASSESSMENT/PLAN:                                                          1. Atypical chest pain  ? Respiratory or GI source.  Not currently having pain but does not appear to be chest wall related.    Follow up with pulmonary as planned.     Patient Instructions   Trial of Combivent respimat with spacer and follow up with pulmonary tomorrow as planned.    I would recommend you journal with recurrent symptoms and look for patterns or trigger to symptoms.    Continue current medications.            Felipa Renteria MD  [facility]    Total time:  28 min,  Counseling time:  Greater than 50% of total time with reference to the above noted symptoms.

## 2017-02-02 NOTE — NURSING NOTE
"Chief Complaint   Patient presents with     Hospital F/U       Initial /84 mmHg  Pulse 61  Temp(Src) 97.9  F (36.6  C) (Oral)  Wt 134.673 kg (296 lb 14.4 oz)  SpO2 99% Estimated body mass index is 35.2 kg/(m^2) as calculated from the following:    Height as of 1/2/17: 1.956 m (6' 5\").    Weight as of this encounter: 134.673 kg (296 lb 14.4 oz).  BP completed using cuff size: tom Wisdom CMA      "

## 2017-02-03 ASSESSMENT — ANXIETY QUESTIONNAIRES: GAD7 TOTAL SCORE: 15

## 2017-02-03 ASSESSMENT — PATIENT HEALTH QUESTIONNAIRE - PHQ9: SUM OF ALL RESPONSES TO PHQ QUESTIONS 1-9: 14

## 2017-02-16 ENCOUNTER — TELEPHONE (OUTPATIENT)
Dept: SLEEP MEDICINE | Facility: CLINIC | Age: 42
End: 2017-02-16

## 2017-02-16 NOTE — TELEPHONE ENCOUNTER
[facility] PSG results from 1/15 and 11/16 were sent to  sleep center. [phone]

## 2017-02-24 DIAGNOSIS — F31.5 BIPOLAR I DISORDER, MOST RECENT EPISODE (OR CURRENT) DEPRESSED, SEVERE, SPECIFIED AS WITH PSYCHOTIC BEHAVIOR (H): Primary | ICD-10-CM

## 2017-02-24 LAB
CHOLEST SERPL-MCNC: 165 MG/DL
GLUCOSE SERPL-MCNC: 88 MG/DL (ref 70–99)
HDLC SERPL-MCNC: 33 MG/DL
LDLC SERPL CALC-MCNC: 67 MG/DL
NONHDLC SERPL-MCNC: 132 MG/DL
TRIGL SERPL-MCNC: 326 MG/DL

## 2017-02-24 PROCEDURE — 82947 ASSAY GLUCOSE BLOOD QUANT: CPT | Mod: GA | Performed by: PHYSICIAN ASSISTANT

## 2017-02-24 PROCEDURE — 80061 LIPID PANEL: CPT | Mod: GA | Performed by: PHYSICIAN ASSISTANT

## 2017-02-24 PROCEDURE — 36415 COLL VENOUS BLD VENIPUNCTURE: CPT | Mod: GA | Performed by: PHYSICIAN ASSISTANT

## 2017-03-07 ENCOUNTER — CARE COORDINATION (OUTPATIENT)
Dept: CARE COORDINATION | Facility: CLINIC | Age: 42
End: 2017-03-07

## 2017-03-07 NOTE — PROGRESS NOTES
3/7/2017 Clinic Care Coordination Contact  [facility]/Voicemail    Referral Source: PCP  Clinical Data: Care Coordinator Outreach  Outreach attempted x 1 as a follow up to our last conversation..  Left message on voicemail with call back information and requested return call.  Plan:If no response to message,  Care Coordinator will  follow up: in 2 -3 weeks    Juliette Cavazos [facility]  , Clinic Care Coordination  Clinics:  [facility] [place], [place]  [phone]   3/7/2017   4:02 PM

## 2017-03-23 ENCOUNTER — MYC REFILL (OUTPATIENT)
Dept: FAMILY MEDICINE | Facility: CLINIC | Age: 42
End: 2017-03-23

## 2017-03-23 DIAGNOSIS — R73.02 IMPAIRED GLUCOSE TOLERANCE: ICD-10-CM

## 2017-03-23 NOTE — TELEPHONE ENCOUNTER
Message from MyChart:  Original authorizing provider: MD SANTIAGO Dickey SONIALopez Pineda would like a refill of the following medications:  metFORMIN (GLUCOPHAGE-XR) 500 MG 24 hr tablet [Ksenia Lyles MD]    Preferred pharmacy: [facility] [id number] [address] [address]    Comment:  Requested med is currently out of refills. Thx for your time.

## 2017-03-24 RX ORDER — METFORMIN HCL 500 MG
500 TABLET, EXTENDED RELEASE 24 HR ORAL
Qty: 90 TABLET | Refills: 1 | Status: SHIPPED | OUTPATIENT
Start: 2017-03-24 | End: 2017-07-10

## 2017-03-24 NOTE — TELEPHONE ENCOUNTER
Metformin         Last Written Prescription Date: 09/19/16  Last Fill Quantity: 90, # refills: 1  [facility] Office [facility] with [facility] or [facility] prescribing provider:  02/02/17 Dr. Renteria          BP Readings from Last 3 Encounters:   02/02/17 112/84   01/02/17 116/80   12/07/16 115/79     Lab Results   Component Value Date    MICROL 11 11/01/2016     No results found for: MICROALBUMIN  Creatinine   Date Value Ref Range Status   01/03/2017 1.32 (H) 0.66 - 1.25 mg/dL Final   ]  GFR Estimate   Date Value Ref Range Status   01/03/2017 59 (L) >60 mL/min/1.7m2 Final     Comment:     Non  GFR Calc   11/01/2016 73 >60 mL/min/1.7m2 Final     Comment:     Non  GFR Calc   04/21/2016 >90  Non  GFR Calc   >60 mL/min/1.7m2 Final     GFR Estimate If Black   Date Value Ref Range Status   01/03/2017 71 >60 mL/min/1.7m2 Final     Comment:      GFR Calc   11/01/2016 88 >60 mL/min/1.7m2 Final     Comment:      GFR Calc   04/21/2016 >90   GFR Calc   >60 mL/min/1.7m2 Final     Lab Results   Component Value Date    CHOL 165 02/24/2017     Lab Results   Component Value Date    HDL 33 02/24/2017     Lab Results   Component Value Date    LDL 67 02/24/2017     Lab Results   Component Value Date    TRIG 326 02/24/2017     Lab Results   Component Value Date    CHOLHDLRATIO 7.0 12/03/2014     Lab Results   Component Value Date    AST 18 01/03/2017     Lab Results   Component Value Date    ALT 27 01/03/2017     Lab Results   Component Value Date    A1C 5.5 09/01/2016    A1C 5.7 12/14/2015    A1C 6.1 08/07/2015    A1C 5.8 08/26/2014     Potassium   Date Value Ref Range Status   01/03/2017 4.0 3.4 - 5.3 mmol/L Final

## 2017-03-29 RX ORDER — METFORMIN HCL 500 MG
TABLET, EXTENDED RELEASE 24 HR ORAL
Qty: 90 TABLET | Refills: 1 | OUTPATIENT
Start: 2017-03-29

## 2017-03-30 ENCOUNTER — CARE COORDINATION (OUTPATIENT)
Dept: CARE COORDINATION | Facility: CLINIC | Age: 42
End: 2017-03-30

## 2017-03-30 NOTE — LETTER
[address]     March 30, 2017      Joel Pineda  [address] [place] [address]    Dear Joel,  I am the Clinic Care Coordinator that works with your primary care provider's clinic. I recently tried to call and was unable to reach you. This call was to follow up on our previous conversation. You were about to start a new job. I am hoping things are going well for you.   Below is a description of what Clinic Care Coordination is and how I can further assist you.     The Clinic Care Coordinator role is a Registered Nurse and/or  who understands the health care system. The goal of Clinic Care Coordination is to help you manage your health and improve access to the [facility] system in the most efficient manner.  The Registered Nurse can assist you in meeting your health care goals by providing education, coordinating services, and strengthening the communication among your providers. The  can assist you with financial, behavioral, psychosocial, and chemical dependency and counseling/psychiatric resources.    Please feel free to keep this letter and contact information to contact me at )[phone]  with any further questions or concerns that may arise. We at [facility] are focused on providing you with the highest-quality healthcare experience possible and that all starts with you.       Sincerely,       Juliette Cavazos [facility]  , Clinic Care Coordination  Clinics:  [place],  [place], [place], [place]  [phone]   3/30/2017   10:37 AM    Enclosed: I have enclosed a copy of the Complex Care Plan. This has helpful information and goals that we have talked about. Please keep this in an easy to access place to use as needed.

## 2017-03-30 NOTE — LETTER
[facility]  Complex Care Plan  About Me  Patient Name:  Joel Lee    YOB: 1975  Age:   41 year old   Gianna MRN: [id number] Telephone Information:     Home Phone [phone]   Mobile [phone]       Address:    [address] [place] [address] [place] [address] Email address:  [email or web address]      Emergency Contact(s)  Name Relationship Lgl Grd Work Phone Home Phone Mobile Phone   1. HARJINDER HANEY Mother No  [phone]   2. FARIBA HANEY parent No  [phone]   3. PEREZ LEE Father No  [phone]           Primary language:  English     needed?     [employer]:  [phone] op. 1  Other communication barriers:    Preferred Method of Communication:     Current living arrangement:    Mobility Status/ Medical Equipment:    Other information to know about me:    Health Maintenance  Health Maintenance Reviewed:      My Access Plan  Medical Emergency 911   Primary Clinic Line     24 Hour Appointment Line [phone] or  [phone]) (toll-free)   24 Hour Nurse Line [phone] (toll-free)   Preferred Urgent Care     Preferred Hospital     Preferred Pharmacy [facility] [address] [facility] [employer] - [place], [place] - [address] [facility]     Behavioral Health Crisis Line Crisis Connection, [phone] or 911     My Care Team Members  Patient Care Team       Relationship Specialty Notifications Start End    Ksenia Lyles MD PCP - General Family Practice  6/7/16     Phone: [phone] Fax: [phone]         [facility] - [address] [address]    Elaine Diaz MD Referring Physician Pain Clinic  8/10/15     Comment:  Referring to Ortho    Phone: [phone] Fax: [phone]         [facility] [address] [facility] [address]    Evan Maldonado MD MD Orthopedics  8/10/15     Phone: [phone] Fax: [phone]           [facility] [address] [facility] [address]    Houston Koroma MD MD Internal Medicine  10/1/15     Phone: [phone] Fax: [phone]         [facility] [address] [facility] [address]    Grant Angel MD MD Psychiatry  10/1/15     Phone: [phone] Fax: [phone]         [facility] [address] [facility] [address] [facility] [address]    Radha Mcdonald, [facility] Pharmacist Pharmacy  10/1/15     Phone: [phone] Fax: [phone]         [facility] [address] [place] [address]    Nish Ang MD Referring Physician Neurosurgery  10/6/15     Comment:  referring to neuropsych    Phone: [phone] Fax: [phone]         [facility] [address] [facility] [id number] [facility] [address]    Judy Garcia, LP   Psychology  12/29/15     Phone: [phone] Fax: [phone]         [facility] [address]  [facility] [address]    Checo Kiran, RN Nurse Coordinator Nurse  4/6/16     Phone: [phone]         Dalila Cavazos LSW    6/23/16     Comment:  Juliette [phone]    Lilaina MATHUR Psych Psychiatrist   8/24/16     Comment:  VERNA Cortez     Phone: [phone]         Dr Daja CHING Therapist   8/24/16     Comment:  VERNA Cortez    Phone: [phone]              My Care Plans  Self Management and Treatment Plan  Goals and (Comments)  Goal #1:  (hope to get a job)      70% of goal reached    Advance Care Plans/Directives Type:        My Medical and Care Information  Problem List   Patient Active Problem List   Diagnosis     Major depressive disorder, recurrent episode (H)     Intermittent asthma     Chronic back pain     Hyperlipidemia LDL goal <130     Hypertension goal BP (blood pressure) < 140/90     Chronic nonallergic rhinitis     History of diverticulitis of colon     Obesity (BMI 30-39.9)     Health Care Home     PE (pulmonary embolism)     SI (sacroiliac) joint  dysfunction     Diverticulosis     GERD (gastroesophageal reflux disease)     Anxiety     Chronic pain     LLOYD (obstructive sleep apnea)- mild (AHI 11)     Intracranial arachnoid cyst     Facet arthritis of cervical region (H)     Acquired hypothyroidism     Bipolar 2 disorder (H)     Allergic rhinitis, unspecified allergic rhinitis type     Chronic midline low back pain without sciatica     Irritable bowel syndrome with diarrhea     B12 deficiency     Impaired glucose tolerance     Essential hypertension with goal blood pressure less than 140/90     Pain in joint involving ankle and foot, left     Psychophysiological insomnia     Mixed hyperlipidemia      Current Medications and Allergies:  See printed Medication Report.    Care Coordination Start Date: 04/06/16   Frequency of Care Coordination:  (6-8 weeks)   Form Last Updated: 03/30/2017

## 2017-03-30 NOTE — PROGRESS NOTES
3/30/2017 Clinic Care Coordination Contact  [facility]/Voicemail - Social Work     Referral Source: PCP  Clinical Data: Care Coordinator Outreach  Outreach attempted x 2 as a follow up to last conversation.  Left message on voicemail with call back information and requested return call.  ]  Plan: Care Coordinator will mail out care coordination Unable to contact letter with care coordinator contact information and explanation of care coordination services.If no response to call and letter,  Care Coordinator will  Plan to close in 2 weeks.    Juliette Cavazos [facility]  , Clinic Care Coordination  Clinics:  [facility] [place], [place]  [phone]   3/30/2017   10:32 AM

## 2017-04-03 ENCOUNTER — ALLIED HEALTH/NURSE VISIT (OUTPATIENT)
Dept: PHARMACY | Facility: CLINIC | Age: 42
End: 2017-04-03
Payer: COMMERCIAL

## 2017-04-03 DIAGNOSIS — E55.9 VITAMIN D DEFICIENCY: Primary | ICD-10-CM

## 2017-04-03 DIAGNOSIS — F31.81 BIPOLAR 2 DISORDER (H): ICD-10-CM

## 2017-04-03 DIAGNOSIS — G47.33 OSA (OBSTRUCTIVE SLEEP APNEA): ICD-10-CM

## 2017-04-03 DIAGNOSIS — G89.4 CHRONIC PAIN SYNDROME: ICD-10-CM

## 2017-04-03 DIAGNOSIS — E78.5 HYPERLIPIDEMIA LDL GOAL <100: ICD-10-CM

## 2017-04-03 DIAGNOSIS — J45.21 INTERMITTENT ASTHMA, WITH ACUTE EXACERBATION: ICD-10-CM

## 2017-04-03 PROCEDURE — 99605 MTMS BY PHARM NP 15 MIN: CPT | Mod: U4 | Performed by: PHARMACIST

## 2017-04-03 PROCEDURE — 99607 MTMS BY PHARM ADDL 15 MIN: CPT | Mod: U4 | Performed by: PHARMACIST

## 2017-04-03 RX ORDER — ALBUTEROL SULFATE 90 UG/1
2 AEROSOL, METERED RESPIRATORY (INHALATION) EVERY 4 HOURS PRN
COMMUNITY
Start: 2017-02-03 | End: 2019-06-04

## 2017-04-03 NOTE — PROGRESS NOTES
SUBJECTIVE/OBJECTIVE:                                                    Joel Pineda is a 41 year old male called for an initial visit for Medication Therapy Management 2015.  He was referred to me from Ksenia Lyles.     Chief Complaint: Medication Review.    Allergies/ADRs: Reviewed in Epic  Tobacco: No tobacco use   Alcohol: none  PMH: Reviewed in Epic    Medication Adherence: no issues reported    Mental Health: current therapy includes effexor 150mg XR once daily in the morning, fluoxetine 20mg daily, lithium 450mg daily. geodon 80mg bid and Geodon has been effective for visual and auditory hallucinations. Mood has been pretty low but is very slowly improving. Patient is going to start EMDR therapy. Patient continues to follow with [facility]. Patient continues to see therapist every 2 weeks.    Supplements: Vitamin D 50,000 units every 2 weeks,  MVI, B12. Patient has stopped all other supplements because he is tired of all of the medications he is taking.. Patient's last vitamin D level was 34 in 2/2016.    Asthma:  Current asthma medications: none.  Pt reports the following symptoms: none  Patient is no longer using any medications for his breathing. Patient is not having any issues with breathing. Patient only has his rescue inhaler that he uses prior to activity. Patient has no shortness of breath or waking up at night due to asthma symptoms.  ACT Total Scores 6/7/2016 8/11/2016 11/1/2016   ACT TOTAL SCORE - - -   ASTHMA ER VISITS - - -   ASTHMA HOSPITALIZATIONS - - -   ACT TOTAL SCORE (Goal Greater than or Equal to 20) 17 17 21   In the past 12 months, how many times did you visit the emergency room for your asthma without being admitted to the hospital? 3 2 1   In the past 12 months, how many times were you hospitalized overnight because of your asthma? 0 0 0     Sleep Apnea: patient was fitted for an appliance and will start using this soon.     Hyperlipidemia: Current therapy includes atorvastatin  40mg once daily, fenofibrate 145mg daily.  Pt reports no significant myalgias or other side effects.   The 10-year ASCVD risk score (Vanna MOISE Jr, et al., 2013) is: 1.8%    Values used to calculate the score:      Age: 41 years      Sex: Male      Is Non- : No      Diabetic: No      Tobacco smoker: No      Systolic Blood Pressure: 112 mmHg      Is BP treated: Yes      HDL Cholesterol: 33 mg/dL      Total Cholesterol: 165 mg/dL    Pain: current therapy oxycodone 5mg as needed, lyrica 150mg bid, skelaxin 800mg tiw. Pain is better controlled. Patient is using less oxycodone and would like to decrease his dose of lyrica; will discuss with Dr. Lyles.    Current labs include:  BP Readings from Last 3 Encounters:   02/02/17 112/84   01/02/17 116/80   12/07/16 115/79     Today's Vitals: There were no vitals taken for this visit.  Lab Results   Component Value Date    A1C 5.5 09/01/2016   .  Lab Results   Component Value Date    CHOL 165 02/24/2017     Lab Results   Component Value Date    TRIG 326 02/24/2017     Lab Results   Component Value Date    HDL 33 02/24/2017     Lab Results   Component Value Date    LDL 67 02/24/2017       Liver Function Studies -   Recent Labs   Lab Test  01/03/17   0825   PROTTOTAL  7.2   ALBUMIN  4.2   BILITOTAL  0.3   ALKPHOS  66   AST  18   ALT  27       Lab Results   Component Value Date    UCRR 46 11/01/2016    MICROL 11 11/01/2016    UMALCR 24.56 (H) 11/01/2016       Last Basic Metabolic Panel:  Lab Results   Component Value Date     01/03/2017      Lab Results   Component Value Date    POTASSIUM 4.0 01/03/2017     Lab Results   Component Value Date    CHLORIDE 105 01/03/2017     Lab Results   Component Value Date    BUN 13 01/03/2017     Lab Results   Component Value Date    CR 1.32 01/03/2017     GFR Estimate   Date Value Ref Range Status   01/03/2017 59 (L) >60 mL/min/1.7m2 Final     Comment:     Non  GFR Calc   11/01/2016 73 >60 mL/min/1.7m2  Final     Comment:     Non  GFR Calc   04/21/2016 >90  Non  GFR Calc   >60 mL/min/1.7m2 Final     GFR Estimate If Black   Date Value Ref Range Status   01/03/2017 71 >60 mL/min/1.7m2 Final     Comment:      GFR Calc   11/01/2016 88 >60 mL/min/1.7m2 Final     Comment:      GFR Calc   04/21/2016 >90   GFR Calc   >60 mL/min/1.7m2 Final     TSH   Date Value Ref Range Status   11/01/2016 2.76 0.40 - 4.00 mU/L Final   ]    Most Recent Immunizations   Administered Date(s) Administered     Influenza (IIV3) 09/09/2013     Influenza Vaccine IM 3yrs+ 4 Valent IIV4 10/11/2016     Pneumococcal (PCV 13) 10/02/2015     Pneumococcal 23 valent 10/11/2016     TD (ADULT, 7+) 10/04/2002     TDAP Vaccine (Adacel) 07/16/2010     ASSESSMENT:                                                       Current medications were reviewed today. Medicare Part D topics discussed:Lab monitoring    Medication Adherence: no issues identified    Mental Health: stable    Asthma: Stable.    Supplemets: patient may benefit from updated Vitamin D level.    Sleep Apnea: stable    Hyperlipidemia: Stable. No change in current medication necessary at this time.    Pain: stable    PLAN:                                                      Recommend updated Vitamin D Level    I spent 30 minutes with this patient today (an extra 15 minutes was spent creating the Medication Action Plan).  All changes were made via collaborative practice agreement with Ksenia Lyles. A copy of the visit note was provided to the patient's primary care provider.    Will follow up in 6 weeks.    The patient was sent via [employer] a summary of these recommendations as an after visit summary.     Radha Mcdonald, Pharm.D, BCACP  Medication Therapy Management Pharmacist

## 2017-04-03 NOTE — LETTER
"     [facility]     Date: 4/3/2017    Joel Pineda  [address] [address] [place] [address]    Dear Mr. Pineda,    Thank you for talking with me on 4/3/2017 about your health and medications. Medicare s MTM (Medication Therapy Management) program helps you understand your medications and use them safely.      This letter includes an action plan (Medication Action Plan) and medication list (Personal Medication List). The action plan has steps you should take to help you get the best results from your medications. The medication list will help you keep track of your medications and how to use them the right way.       Have your action plan and medication list with you when you talk with your doctors, pharmacists, and other healthcare providers in your care team.     Ask your doctors, pharmacists, and other healthcare providers to update the action plan and medication list at every visit.     Take your medication list with you if you go to the hospital or emergency room.     Give a copy of the action plan and medication list to your family or caregivers.     If you want to talk about this letter or any of the papers with it, please call   [phone].   We look forward to working with you, your doctors, and other healthcare providers to help you stay healthy.     Sincerely,    Radha Mcdonald      Enclosed: Medication Action Plan and Personal Medication List    MEDICATION ACTION PLAN FOR Joel Pineda,  1975     This action plan will help you get the best results from your medications if you:   1. Read \"What we talked about.\"   2. Take the steps listed in the \"What I need to do\" boxes.   3. Fill in \"What I did and when I did it.\"   4. Fill in \"My follow-up plan\" and \"Questions I want to ask.\"     Have this action plan with you when you talk with your doctors, pharmacists, and other healthcare providers in your care team. Share this with your family or caregivers too.  DATE " PREPARED: 4/3/2017  What we talked about: your dose of vitamin D                                               What I need to do: have your vitamin d level checked       What I did and when I did it:                                              My follow-up plan:                 Questions I want to ask:              If you have any questions about your action plan, call Radha Mcdonald, Phone: [phone] , Monday-Friday 8-4:30pm.           MEDICATION LIST FOR Joel PinedaRUY 1975     This medication list was made for you after we talked. We also used information from your doctor's chart.      Use blank rows to add new medications. Then fill in the dates you started using them.    Cross out medications when you no longer use them. Then write the date and why you stopped using them.    Ask your doctors, pharmacists, and other healthcare providers to update this list at every visit. Keep this list up-to-date with:       Prescription medications    Over the counter drugs     Herbals    Vitamins    Minerals      If you go to the hospital or emergency room, take this list with you. Share this with your family or caregivers too.     DATE PREPARED: 4/3/2017  Allergies or side effects: Banana; Nitroglycerin; Penicillins; Provigil [modafinil]; Ciprofloxacin; Gadolinium; Dulera; Dye [contrast dye]; Levaquin; Levofloxacin; Neurontin [gabapentin]; Nortriptyline; Varicella virus vaccine live; Ciprofloxacin; Flagyl [metronidazole hcl]; Latex; Metronidazole; and No clinical screening - see comments     Medication:  ACETAMINOPHEN 500 MG PO CAPS      How I use it:  Take 500 mg by mouth every 4 hours as needed      Why I use it: Pain      Prescriber:  Ksenia Lyles MD      Date I started using it:       Date I stopped using it:         Why I stopped using it:            Medication:  ALBUTEROL SULFATE  (90 BASE) MCG/ACT IN AERS      How I use it:  Inhale 2 puffs into the lungs every 4 hours as needed       Why I use it: Asthma      Prescriber:  Patient Reported      Date I started using it:       Date I stopped using it:         Why I stopped using it:            Medication:  ATENOLOL 50 MG PO TABS      How I use it:  TAKE 1 TABLET BY MOUTH EVERY DAY      Why I use it: Essential hypertension with goal blood pressure less than 140/90    Prescriber:  Ksenia Lyles MD      Date I started using it:       Date I stopped using it:         Why I stopped using it:            Medication:  ATORVASTATIN CALCIUM 40 MG PO TABS      How I use it:  Take 1 tablet (40 mg) by mouth daily      Why I use it: Mixed hyperlipidemia    Prescriber:  Ksenia Lyles MD      Date I started using it:       Date I stopped using it:         Why I stopped using it:            Medication:  BUDESONIDE 0.25 MG/2ML IN SUSP      How I use it:  Irrigate sinus with 0.25mg of budesonide bid along with saline solution      Why I use it:      Prescriber:  Patient Reported      Date I started using it:       Date I stopped using it:         Why I stopped using it:                                Medication:  CALMOL-4 76-10 % RE SUPP      How I use it:  Place 1 suppository rectally daily as needed      Why I use it: Anal pain    Prescriber:  Aurora Justice MD      Date I started using it:       Date I stopped using it:         Why I stopped using it:            Medication:  CELECOXIB 200 MG PO CAPS      How I use it:  TAKE 1 CAPSULE BY MOUTH TWICE DAILY AS NEEDED FORMODERATE PAIN      Why I use it: Chronic midline low back pain without sciatica    Prescriber:  Ksenia Lyles MD      Date I started using it:       Date I stopped using it:         Why I stopped using it:            Medication:  CETIRIZINE HCL 10 MG PO TABS      How I use it:  Take 1 tablet (10 mg) by mouth At Bedtime      Why I use it: Cough    Prescriber:  Houston Koroma MD      Date I started using it:       Date I stopped using it:         Why I stopped using  it:            Medication:  CLONAZEPAM 1 MG PO TABS      How I use it:  0.5 mg 2 times daily as needed       Why I use it: Depression with anxiety    Prescriber:  Ksenia Lyles MD      Date I started using it:       Date I stopped using it:         Why I stopped using it:            Medication:  CYANOCOBALAMIN 1000 MCG/ML IJ SOLN      How I use it:  Inject 1 mL (1,000 mcg) into the muscle every 30 days      Why I use it: B12 deficiency    Prescriber:  Ksenia Lyles MD      Date I started using it:       Date I stopped using it:         Why I stopped using it:            Medication:  DICYCLOMINE HCL 20 MG PO TABS      How I use it:  Take 1 tablet (20 mg) by mouth 4 times daily as needed      Why I use it: Irritable bowel syndrome with diarrhea    Prescriber:  Ksenia Lyles MD      Date I started using it:       Date I stopped using it:         Why I stopped using it:            Medication:  EPINEPHRINE 0.3 MG/0.3ML IJ SOAJ      How I use it:  Inject 0.3 mLs (0.3 mg) into the muscle once as needed for anaphylaxis      Why I use it: Food allergy    Prescriber:  Ksenia Lylse MD      Date I started using it:       Date I stopped using it:         Why I stopped using it:            Medication:  FENOFIBRATE 145 MG PO TABS      How I use it:  Take 1 tablet (145 mg) by mouth daily      Why I use it: Mixed hyperlipidemia    Prescriber:  Ksenia Lyles MD      Date I started using it:       Date I stopped using it:         Why I stopped using it:            Medication:  FLUOXETINE HCL 10 MG PO CAPS      How I use it:  Take 10 mg by mouth every morning      Why I use it: Depression      Prescriber:  Patient Reported      Date I started using it:       Date I stopped using it:         Why I stopped using it:            Medication:  IRON SUPPLEMENT PO      How I use it:  Take 325 mg by mouth three times a week      Why I use it: Anemia      Prescriber:  Patient Reported      Date I  started using it:       Date I stopped using it:         Why I stopped using it:            Medication:  LEVOTHYROXINE SODIUM 75 MCG PO TABS      How I use it:  TAKE 1 TABLET BY MOUTH EVERY MORNING      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)    Prescriber:  Ksenia Lyles MD      Date I started using it:       Date I stopped using it:         Why I stopped using it:            Medication:  LIDOCAINE 5 % EX OINT      How I use it:  Apply topically 3 times daily as needed for moderate pain Apply as directed      Why I use it: Chronic midline low back pain without sciatica    Prescriber:  Ksenia Lyles MD      Date I started using it:       Date I stopped using it:         Why I stopped using it:            Medication:  LITHIUM CARBONATE  MG PO TBCR      How I use it:  Take 3 tablets (1,350 mg) by mouth At Bedtime      Why I use it: Bipolar Disorder      Prescriber:  Ksenia Lyles MD      Date I started using it:       Date I stopped using it:         Why I stopped using it:            Medication:  METAXALONE 800 MG PO TABS      How I use it:  TAKE ONE TABLET BY MOUTH THREE TIMES DAILY.      Why I use it: Dorsalgia    Prescriber:  Ksenia Lyles MD      Date I started using it:       Date I stopped using it:         Why I stopped using it:            Medication:  METFORMIN HCL  MG PO TB24      How I use it:  Take 1 tablet (500 mg) by mouth daily (with dinner)      Why I use it: Impaired glucose tolerance    Prescriber:  Ksenia Lyles MD      Date I started using it:       Date I stopped using it:         Why I stopped using it:            Medication:  MONTELUKAST SODIUM 10 MG PO TABS      How I use it:  Take 1 tablet (10 mg) by mouth At Bedtime      Why I use it: Intermittent asthma, uncomplicated    Prescriber:  Ksenia Lyles MD      Date I started using it:       Date I stopped using it:         Why I stopped using it:            Medication:   OMEPRAZOLE 20 MG PO TBEC      How I use it:  Take 20 mg by mouth 2 times daily       Why I use it: Acid Reflux      Prescriber:  Patient Reported      Date I started using it:       Date I stopped using it:         Why I stopped using it:            Medication:  ONDANSETRON HCL 8 MG PO TABS      How I use it:  TAKE 1 TABLET (8 MG) BY MOUTH EVERY 8 HOURS AS NEEDED FOR NAUSEA/VOMITING.      Why I use it: Nausea    Prescriber:  Ksenia Lyles MD      Date I started using it:       Date I stopped using it:         Why I stopped using it:            Medication:  ORDER FOR DME (SET TO LOCAL PRINT)      How I use it:  Equipment being ordered: 1 ml tuberculin syringes to be used for Vitamin B12 injections.      Why I use it: Vitamin B12 deficiency (non anaemic)    Prescriber:  Houston Koroma MD      Date I started using it:       Date I stopped using it:         Why I stopped using it:            Medication:  OXYCODONE HCL 5 MG PO TABS      How I use it:  Take 1-2 tablets (5-10 mg) by mouth every 6 hours as needed for pain (maximum 4 tablet(s) per day) . For post-procedure pain- this replaces your regular short-acting script.      Why I use it: Facet arthropathy (H)    Prescriber:  Ksenia Lyles MD      Date I started using it:       Date I stopped using it:         Why I stopped using it:            Medication:  PREGABALIN 150 MG PO CAPS      How I use it:  Take 1 capsule (150 mg) by mouth 2 times daily      Why I use it: Chronic midline low back pain without sciatica    Prescriber:  Ksenia Lyles MD      Date I started using it:       Date I stopped using it:         Why I stopped using it:            Medication:  PROCHLORPERAZINE MALEATE 10 MG PO TABS      How I use it:  Take 1 tablet (10 mg) by mouth 3 times daily as needed for nausea      Why I use it: Nausea    Prescriber:  Ksenia Lyles MD      Date I started using it:       Date I stopped using it:         Why I stopped using  it:            Medication:  RAMIPRIL 5 MG PO CAPS      How I use it:  Take 1 capsule (5 mg) by mouth daily      Why I use it: Essential hypertension    Prescriber:  Ksenia Lyles MD      Date I started using it:       Date I stopped using it:         Why I stopped using it:            Medication:  SUVOREXANT 15 MG PO TABS      How I use it:  Take 10 mg by mouth nightly as needed       Why I use it: Insomnia      Prescriber:  Patient Reported      Date I started using it:       Date I stopped using it:         Why I stopped using it:            Medication:  TRIAMCINOLONE ACETONIDE 0.1 % EX CREA      How I use it:  Apply topically as needed for irritation      Why I use it: Skin rash      Prescriber:  Patient Reported      Date I started using it:       Date I stopped using it:         Why I stopped using it:            Medication:  VITAMIN D3 (CHOLECALCIFEROL) 07631 UNIT PO CAPS      How I use it:  Take one capsule every two weeks.      Why I use it: Vitamin D deficiency    Prescriber:  Houston Koroma MD      Date I started using it:       Date I stopped using it:         Why I stopped using it:            Medication:  ZIPRASIDONE HCL 80 MG PO CAPS      How I use it:  Take 80 mg by mouth 2 times daily      Why I use it: Bipolar Disorder      Prescriber:  Patient Reported      Date I started using it:       Date I stopped using it:         Why I stopped using it:            Medication:         How I use it:         Why I use it:      Prescriber:         Date I started using it:       Date I stopped using it:         Why I stopped using it:            Medication:         How I use it:         Why I use it:      Prescriber:         Date I started using it:       Date I stopped using it:         Why I stopped using it:            Medication:         How I use it:         Why I use it:      Prescriber:         Date I started using it:       Date I stopped using it:         Why I stopped using it:               Other Information:     If you have any questions about your action plan, call [phone].    According to the Paperwork Reduction Act of 1995, no persons are required to respond to a collection of information unless it displays a valid OMB control number. The valid OMB number for this information collection is [id number]. The time required to complete this information collection is estimated to average 40 minutes per response, including the time to review instructions, searching existing data resources, gather the data needed, and complete and review the information collection. If you have any comments concerning the accuracy of the time estimate(s) or suggestions for improving this form, please write to: CMS, Attn: ROSI Reports Clearance Officer, [address] [place] [id number].

## 2017-04-04 ENCOUNTER — OFFICE VISIT (OUTPATIENT)
Dept: FAMILY MEDICINE | Facility: CLINIC | Age: 42
End: 2017-04-04
Payer: COMMERCIAL

## 2017-04-04 VITALS
TEMPERATURE: 98.2 F | RESPIRATION RATE: 16 BRPM | HEART RATE: 72 BPM | HEIGHT: 77 IN | SYSTOLIC BLOOD PRESSURE: 130 MMHG | OXYGEN SATURATION: 96 % | DIASTOLIC BLOOD PRESSURE: 76 MMHG

## 2017-04-04 DIAGNOSIS — M47.819 FACET ARTHROPATHY: ICD-10-CM

## 2017-04-04 DIAGNOSIS — G89.4 CHRONIC PAIN SYNDROME: ICD-10-CM

## 2017-04-04 DIAGNOSIS — L02.92 BOIL: Primary | ICD-10-CM

## 2017-04-04 PROCEDURE — 99207 ZZC NO CHARGE LOS: CPT | Performed by: FAMILY MEDICINE

## 2017-04-04 PROCEDURE — 10060 I&D ABSCESS SIMPLE/SINGLE: CPT | Performed by: FAMILY MEDICINE

## 2017-04-04 RX ORDER — OXYCODONE HYDROCHLORIDE 5 MG/1
5-10 TABLET ORAL EVERY 6 HOURS PRN
Qty: 35 TABLET | Refills: 0 | Status: SHIPPED | OUTPATIENT
Start: 2017-04-04 | End: 2017-05-15

## 2017-04-04 ASSESSMENT — PAIN SCALES - GENERAL: PAINLEVEL: MILD PAIN (2)

## 2017-04-04 NOTE — LETTER
[facility]    04/04/17    Patient: Joel Pineda  YOB: 1975  Medical Record Number: [id number]                                                                  Controlled Substance Agreement  I understand that my care provider has prescribed controlled substances (narcotics, tranquilizers, and/or stimulants) to help manage my condition(s).  I am taking this medicine to help me function or work.  I know that this is strong medicine.  It could have serious side effects and even cause a dependency on the drug.  If I stop these medicines suddenly, I could have severe withdrawal symptoms.    The risks, benefits, and side effects of these medication(s) were explained to me.  I agree that:  1. I will take part in other treatments as advised by my provider.  This may be psychiatry or counseling, physical therapy, behavioral therapy, group treatment, or a referral to a pain clinic.  I will reduce or stop my medicine when my provider tells me to do so.   2. I will take my medicines as prescribed.  I will not change the dose or schedule unless my provider tells me to.  There will be no refills if I  run out early.   I may be contacted at any time without warning and asked to complete a drug test or pill count.   3. I will keep all my appointments at the clinic.  If I miss appointments or fail to follow instructions, my provider may stop my medicine.  4. I will not ask other providers to prescribe controlled substances. And I will not accept controlled substances from other people. If I need another prescribed controlled substance for a new reason, I will notify my provider within one business day.  5. If I enroll in the [place] Medical Marijuana program, I will tell my provider.  I will also sign an agreement to share my medical records with my provider.  6. I will use one pharmacy to fill all of my controlled substance prescriptions.  If my prescription is mailed to my pharmacy, it may take 5  to 7 days for my medicine to be ready.  7. I understand that my provider, clinic care team, and pharmacy can track controlled substance prescriptions from other providers through a central database (prescription monitoring program).  8. I will bring in my list of medications (or my medicine bottles) each time I come to the clinic.  REV-  04/2016                                                                                                                                            Page 1 of 2      [facility]    04/04/17    Patient: Joel Pineda  YOB: 1975  Medical Record Number: [id number]    9. Refills of controlled substances will be made only during office hours.  It is up to me to make sure that I do not run out of my medicines on weekends or holidays.    10. I am responsible for my prescriptions.  If the medicine is lost or stolen, it will not be replaced.   I also agree not to share these medicines with anyone.  11. I agree to not use ANY illegal or recreational drugs.  This includes marijuana, cocaine, bath salts or other drugs.  I agree not to use alcohol unless my provider says I may.  I agree to give urine samples whenever asked.  If I fail to give a urine sample, the provider may stop my medicine.     12. I will tell my nurse or provider right away if I become pregnant or have a new medical problem treated outside of [facility]  13. I understand that this medicine can affect my thinking and judgment.  It may be unsafe for me to drive, use machinery and do dangerous tasks.  I will not do any of these things until I know how the medicine affects me.  If my dose changes, I will wait to see how it affects me.  I will contact my provider if I have concerns about medicine side effects.  I understand that if I do not follow any of the conditions above, my prescriptions or treatment may be stopped.    I agree that my provider, clinic care team, and pharmacy may work with any  city, state or federal law enforcement agency that investigates the misuse, sale, or other diversion of my controlled medicine. I will allow my provider to discuss my care with or share a copy of this agreement with any other treating provider, pharmacy or emergency room where I receive care.  I agree to give up (waive) any right of privacy or confidentiality with respect to these authorizations.   I have read this agreement and have asked questions about anything I did not understand.   ___________________________________    ___________________________  Patient Signature                                                           Date and Time  ___________________________________     ____________________________  Witness                                                                            Date and Time  ___________________________________  Ksenia Lyles MD  REV-  04/2016                                                                                                                                                                 Page 2 of 2  Opioid Pain Medicines (also known as Narcotics)  What You Need to Know      What are opioids?   Opioids are pain medicines that must be prescribed by a doctor. Examples are:     morphine (MS Contin, Lindy)    oxycodone (Oxycontin)    oxycodone and acetaminophen (Percocet)    hydrocodone and acetaminophen (Vicodin, Norco)     fentanyl patch (Duragesic)     hydromorphone (Dilaudid)     methadone     What do opioids do well?   Opioids are best for short-term pain after a surgery or injury. They also work well for cancer pain. Unlike other pain medicines, they do not cause liver or kidney failure or ulcers. They may help some people with long-lasting (chronic) pain.     What do opioids NOT do well?   Opioids never get rid of pain entirely, and they do not work well for most patients with chronic pain. Opioids do not reduce swelling, one of the causes of pain. They also  don t work well for nerve pain.     Side effects  Talk to your doctor before you start or decide to keep taking one of these medicines. Side effects include:    Lowers your breathing rate enough that it could cause death    Death due to taking more than the prescribed dose    Serious lifelong opioid use      Dependence is not the same as addiction. Addiction is when people keep using a substance that harms their body, their mind or their relations with others. If you have a history of drug or alcohol abuse, taking opioids can cause a relapse.  Over time, opioids don t work as well. Most people will need higher and higher doses. The higher the dose, the more serious the side effects. We don t know the long-term effects of opioids.   People who have used opioids for a long time have a lower quality of life, worse depression, higher levels of pain and more visits to doctors.  Overdose from prescription drugs is the second leading cause of death in the [place] The risk of overdose rises when opioids are taken with other drugs such as:    Medicines used for anxiety and panic attacks (such as lorazepam, alprazolam, clonazepam    Other sedatives    Alcohol    Illegal drugs such as heroin  Never share your opioids with others. Be sure to store opioids in a secure place, locked if possible.Young children can easily swallow them and overdose.     Are there other ways to manage pain?  Ways to help reduce pain:    Exercise every day.    Treat health problems that may be causing pain.    Treat mental health problems like depression and anxiety.     Worse depression symptoms; Less pleasure in things you usually enjoy    Feeling tired or sluggish    Slower thoughts or cloudy thinking    Being more sensitive to pain over time; Pain is harder to control.    Trouble sleeping or restless sleep    Changes in hormone levels (for example, less testosterone).     Changes in sex drive or ability to have sex    Long lasting nausea and  constipation    Trouble breathing while asleep; This is worse with lung problems like COPD or sleep apnea.    Unsafe driving    Getting sick more often    Itching    Feeling dizzy    Dry mouth    Sweating    Trouble emptying the bladder (peeing). This is worse if you have an enlarged prostate or get urinary tract infections (UTIs).    What else should I know about opioids?  When someone takes opioids for too long or too often, they become dependent. This means that if you stop or reduce the medicine too quickly, you will have withdrawal symptoms.          Practice good sleep habits.  Try to go to bed and get up at the same time every day.    Stop smoking.  Tobacco use can make pain worse.    Do things that you enjoy.    Find a way to work through pain without drugs.  Try deep breathing, meditation, visual imagery and aromatherapy.    Ask your doctor to help you create a plan to manage your pain.

## 2017-04-04 NOTE — MR AVS SNAPSHOT
After Visit Summary   4/4/2017    Joel Pineda    MRN: [id number]           Patient Information     Date Of Birth          1975        Visit Information        Provider Department      4/4/2017 1:40 PM Ksenia Lyles MD [facility]        Today's Diagnoses     Boil    -  1    Chronic pain syndrome        Facet arthropathy (H)           Follow-ups after your visit        Follow-up notes from your care team     Return if symptoms worsen or fail to improve.      Your next 10 appointments already scheduled     May 15, 2017  3:00 PM CDT   TELEMEDICINE with Radha Mcdonald [facility] [place] [place]    [address] [address] [place] [address] [place]  [address] [facility]  [facility] [address] [phone]           Note: this is not an onsite visit; there is no need to come to the facility.              Future tests that were ordered for you today     Open Future Orders        Priority Expected Expires Ordered    Vitamin D Deficiency Routine  4/3/2018 4/3/2017            Who to contact     If you have questions or need follow up information about today's clinic visit or your schedule please contact [facility] directly at [phone].  Normal or non-critical lab and imaging results will be communicated to you by MyChart, letter or phone within 4 business days after the clinic has received the results. If you do not hear from us within 7 days, please contact the clinic through MyChart or phone. If you have a critical or abnormal lab result, we will notify you by phone as soon as possible.  Submit refill requests through [employer] or call your pharmacy and they will forward the refill request to us. Please allow 3 business days for your refill to be completed.          Additional Information About Your Visit        MyChart Information     [employer] gives you secure access to your electronic health record. If you see a primary care provider, you can  "also send messages to your care team and make appointments. If you have questions, please call your primary care clinic.  If you do not have a primary care provider, please call [phone] and they will assist you.        Care EveryWhere ID     This is your Care EveryWhere ID. This could be used by other organizations to access your [facility] medical records  [id number]        Your Vitals Were     Pulse Temperature Respirations Height Pulse Oximetry       72 98.2  F (36.8  C) (Oral) 16 1.956 m (6' 5\") 96%        Blood Pressure from Last 3 Encounters:   04/04/17 130/76   02/02/17 112/84   01/02/17 116/80    Weight from Last 3 Encounters:   02/02/17 134.7 kg (296 lb 14.4 oz)   01/02/17 133.3 kg (293 lb 12.8 oz)   12/07/16 133.8 kg (295 lb)              We Performed the Following     DRAIN SKIN ABSCESS SIMPLE/SINGLE          Today's Medication Changes          These changes are accurate as of: 4/4/17  2:10 PM.  If you have any questions, ask your nurse or doctor.               These medicines have changed or have updated prescriptions.        Dose/Directions    cetirizine 10 MG tablet   Commonly known as:  zyrTEC   This may have changed:  when to take this   Used for:  Cough        Dose:  10 mg   Take 1 tablet (10 mg) by mouth At Bedtime   Quantity:  30 tablet   Refills:  11       * oxyCODONE 5 MG IR tablet   Commonly known as:  ROXICODONE   This may have changed:  Another medication with the same name was added. Make sure you understand how and when to take each.   Used for:  Facet arthropathy (H)   Changed by:  Ksenia Lyles MD        Dose:  5-10 mg   Take 1-2 tablets (5-10 mg) by mouth every 6 hours as needed for pain (maximum 4 tablet(s) per day) . For post-procedure pain- this replaces your regular short-acting script.   Quantity:  35 tablet   Refills:  0       * oxyCODONE 5 MG IR tablet   Commonly known as:  ROXICODONE   This may have changed:  You were already taking a medication with the same name, " and this prescription was added. Make sure you understand how and when to take each.   Used for:  Facet arthropathy (H)   Changed by:  Ksenia Lyles MD        Dose:  5-10 mg   Take 1-2 tablets (5-10 mg) by mouth every 6 hours as needed for pain (maximum 4 tablet(s) per day) . For post-procedure pain- this replaces your regular short-acting script.   Quantity:  35 tablet   Refills:  0       * Notice:  This list has 2 medication(s) that are the same as other medications prescribed for you. Read the directions carefully, and ask your doctor or other care provider to review them with you.         Where to get your medicines      Some of these will need a paper prescription and others can be bought over the counter.  Ask your nurse if you have questions.     Bring a paper prescription for each of these medications     oxyCODONE 5 MG IR tablet                Primary Care Provider Office Phone # Fax #    Ksenia Lyles [phone]       [address] [address]        Goals        General    I will continue to attend Psychiatry appointments for medication management, 1/14/2014 (pt-stated)     Notes - Note edited  8/24/2016  4:04 PM by Dalila Cavazos LSW    As of today's date 8/24/2016 goal is met at 51 - 75%.   Goal Status:  Ongoing Sees  Therapist every other week and Psych PA every 3 weeks.  As of today's date 5/25/2016 goal is met at 51 - 75%.   Goal Status:  Showing progress-  Meeting with Psych PA for second time tomorrow  To review meds As of today's date 4/11/2016 goal is met at 51 - 75%.   Goal Status:  Showing progress  As of today's date 1/14/2014 goal is met at 51 - 75%.   Goal Status:  Active        I will schedule therapy with new counselor, 1/14/2014 (pt-stated)     Notes - Note edited  5/25/2016  1:54 PM by Dalila Cavazos LSW    As of today's date 5/25/2016 goal is met at 76 - 100%.   Goal Status:  Ongoing New  therapist and Psych PA on a regular basis  As of today's date 5/10/2016 goal is met at 76 - 100%.   Goal Status:  Ongoing met with new therapist at [facility]  As of today's date 4/11/2016 goal is met at 51 - 75%.   Goal Status:  Ongoing meets with therapist regularly  As of today's date 1/14/2014 goal is met at 0 - 25%.   Goal Status:  Active        Psychosocial I need some help with cleaning (pt-stated)     Notes - Note edited  6/23/2016  1:57 PM by Dalila Cavazos LSW    As of today's date 6/23/2016 goal is met at 26 - 50%.   Goal Status:  Showing progress met with [facility]. Has not yet made a decision  As of today's date 5/25/2016 goal is met at 0 - 25%.   Goal Status:  Ongoing agency had to reschedule appt.  As of today's date 5/10/2016 goal is met at 0 - 25%.   Goal Status:  Active referral to [facility]        Thank you!     Thank you for choosing [facility]  for your care. Our goal is always to provide you with excellent care. Hearing back from our patients is one way we can continue to improve our services. Please take a few minutes to complete the written survey that you may receive in the mail after your visit with us. Thank you!             Your Updated Medication List - Protect others around you: Learn how to safely use, store and throw away your medicines at www.disposemymeds.org.          This list is accurate as of: 4/4/17  2:10 PM.  Always use your most recent med list.                   Brand Name Dispense Instructions for use    acetaminophen 500 MG Caps     60 capsule    Take 500 mg by mouth every 4 hours as needed       albuterol 108 (90 BASE) MCG/ACT Inhaler    PROAIR HFA/PROVENTIL HFA/VENTOLIN HFA     Inhale 2 puffs into the lungs every 4 hours as needed       atenolol 50 MG tablet    TENORMIN    90 tablet    TAKE 1 TABLET BY MOUTH EVERY DAY       atorvastatin 40 MG tablet    LIPITOR    90 tablet    Take 1 tablet (40 mg) by mouth daily       BELSOMRA 15 MG tablet   Generic drug:   Suvorexant      Take 10 mg by mouth nightly as needed       budesonide 0.25 MG/2ML neb solution    PULMICORT     Irrigate sinus with 0.25mg of budesonide bid along with saline solution       calcium citrate + Tabs      250mg bid       CALMOL-4 76-10 % Supp     30 suppository    Place 1 suppository rectally daily as needed       celecoxib 200 MG capsule    celeBREX    180 capsule    TAKE 1 CAPSULE BY MOUTH TWICE DAILY AS NEEDED FORMODERATE PAIN       cetirizine 10 MG tablet    zyrTEC    30 tablet    Take 1 tablet (10 mg) by mouth At Bedtime       clonazePAM 1 MG tablet    klonoPIN    60 tablet    0.5 mg 2 times daily as needed       cyanocobalamin 1000 MCG/ML injection    VITAMIN B12    0.9 mL    Inject 1 mL (1,000 mcg) into the muscle every 30 days       dicyclomine 20 MG tablet    BENTYL    120 tablet    Take 1 tablet (20 mg) by mouth 4 times daily as needed       EPINEPHrine 0.3 MG/0.3ML injection     0.6 mL    Inject 0.3 mLs (0.3 mg) into the muscle once as needed for anaphylaxis       fenofibrate 145 MG tablet     90 tablet    Take 1 tablet (145 mg) by mouth daily       FLUoxetine 10 MG capsule    PROzac     Take 10 mg by mouth every morning       levothyroxine 75 MCG tablet    SYNTHROID/LEVOTHROID    90 tablet    TAKE 1 TABLET BY MOUTH EVERY MORNING       lidocaine 5 % ointment    XYLOCAINE    250 g    Apply topically 3 times daily as needed for moderate pain Apply as directed       lithium 450 MG CR tablet    ESKALITH    75 tablet    Take 3 tablets (1,350 mg) by mouth At Bedtime       metaxalone 800 MG tablet    SKELAXIN    90 tablet    TAKE ONE TABLET BY MOUTH THREE TIMES DAILY.       metFORMIN 500 MG 24 hr tablet    GLUCOPHAGE-XR    90 tablet    Take 1 tablet (500 mg) by mouth daily (with dinner)       montelukast 10 MG tablet    SINGULAIR    90 tablet    Take 1 tablet (10 mg) by mouth At Bedtime       omeprazole 20 MG tablet      Take 20 mg by mouth 2 times daily       ondansetron 8 MG tablet    ZOFRAN     20 tablet    TAKE 1 TABLET (8 MG) BY MOUTH EVERY 8 HOURS AS NEEDED FOR NAUSEA/VOMITING.       * order for DME     12 each    Equipment being ordered: 1 ml tuberculin syringes to be used for Vitamin B12 injections.       * order for DME     1 Units    SI-loc belt       * order for DME      [employer]ics REMSTAR 60 Series Auto CPAP 10-12 cm H2O, Wisp nasal mask w/a large cushion and a chinstrap       * oxyCODONE 5 MG IR tablet    ROXICODONE    35 tablet    Take 1-2 tablets (5-10 mg) by mouth every 6 hours as needed for pain (maximum 4 tablet(s) per day) . For post-procedure pain- this replaces your regular short-acting script.       * oxyCODONE 5 MG IR tablet    ROXICODONE    35 tablet    Take 1-2 tablets (5-10 mg) by mouth every 6 hours as needed for pain (maximum 4 tablet(s) per day) . For post-procedure pain- this replaces your regular short-acting script.       pregabalin 150 MG capsule    LYRICA    180 capsule    Take 1 capsule (150 mg) by mouth 2 times daily       prochlorperazine 10 MG tablet    COMPAZINE    30 tablet    Take 1 tablet (10 mg) by mouth 3 times daily as needed for nausea       ramipril 5 MG capsule    ALTACE    90 capsule    Take 1 capsule (5 mg) by mouth daily       triamcinolone 0.1 % cream    KENALOG     Apply topically as needed for irritation       vitamin D3 59890 UNITS capsule    CHOLECALCIFEROL    24 capsule    Take one capsule every two weeks.       ziprasidone 80 MG capsule    GEODON     Take 80 mg by mouth 2 times daily       * Notice:  This list has 5 medication(s) that are the same as other medications prescribed for you. Read the directions carefully, and ask your doctor or other care provider to review them with you.

## 2017-04-04 NOTE — PROGRESS NOTES
"  SUBJECTIVE:                                                    Joel Pineda is a 41 year old male who presents to clinic today for the following health issues:      Concern - boil on back, R buttocks     Onset: 4/1/17    Description:   Pt states pain has improved    Intensity: mild    Progression of Symptoms:  improving    Accompanying Signs & Symptoms:  Red       Previous history of similar problem:   Yes - hx of boils    Precipitating factors:   Worsened by: pressure, touch    Alleviating factors:  Improved by: None       Therapies Tried and outcome: Bath - not sure if helping    SUBJECTIVE:  Here today for a boil to right lower back that started a few days ago. Has grown a little. Has tried to drain it through mechanical means and hot baths. Solitary. No fevers or chills.    Review of systems otherwise negative.  Past medical, family, and social history reviewed and updated in chart.    OBJECTIVE:  /76 (BP Location: Right arm, Patient Position: Right side, Cuff Size: Adult Regular)  Pulse 72  Temp 98.2  F (36.8  C) (Oral)  Resp 16  Ht 1.956 m (6' 5\")  SpO2 96%  Alert, pleasant, upbeat, and in no apparent discomfort.  Skin - solitary area of fluctuant tender erythema measuring about 1 cm with a larger subcutaneous mass right lower back    Region cleansed with alcohol and anesthetized with lidocaine + epi  #11 scalpel used to open incision over the cavity  Purulent drainage expressed   Cavity explored bluntly and flushed with lidocaine  Bandaged     Past labs reviewed with the patient.     ASSESSMENT / PLAN:  (L02.92) Boil  (primary encounter diagnosis)  Comment: Aftercare discussed  Plan: DRAIN SKIN ABSCESS SIMPLE/SINGLE            (G89.4) Chronic pain syndrome  Comment: Problem list and CSA updated  Plan:     (M12.88) Facet arthropathy (H)  Comment:   Plan: oxyCODONE (ROXICODONE) 5 MG IR tablet            Follow up as needed   JA Lyles MD    (Chart documentation completed in part with " Dragon voice-recognition software.  Even though reviewed some grammatical, spelling, and word errors may remain.)

## 2017-04-14 ENCOUNTER — MYC MEDICAL ADVICE (OUTPATIENT)
Dept: FAMILY MEDICINE | Facility: CLINIC | Age: 42
End: 2017-04-14

## 2017-04-14 DIAGNOSIS — M54.50 CHRONIC MIDLINE LOW BACK PAIN WITHOUT SCIATICA: ICD-10-CM

## 2017-04-14 DIAGNOSIS — G89.29 CHRONIC MIDLINE LOW BACK PAIN WITHOUT SCIATICA: ICD-10-CM

## 2017-04-17 ENCOUNTER — TRANSFERRED RECORDS (OUTPATIENT)
Dept: HEALTH INFORMATION MANAGEMENT | Facility: CLINIC | Age: 42
End: 2017-04-17

## 2017-04-17 RX ORDER — PREGABALIN 150 MG/1
150 CAPSULE ORAL DAILY
Qty: 90 CAPSULE | Refills: 1 | Status: SHIPPED | OUTPATIENT
Start: 2017-04-17 | End: 2017-05-15

## 2017-04-17 RX ORDER — PREGABALIN 100 MG/1
100 CAPSULE ORAL DAILY
Qty: 90 CAPSULE | Refills: 1 | Status: SHIPPED | OUTPATIENT
Start: 2017-04-17 | End: 2017-06-13

## 2017-04-21 ENCOUNTER — CARE COORDINATION (OUTPATIENT)
Dept: CARE COORDINATION | Facility: CLINIC | Age: 42
End: 2017-04-21

## 2017-04-21 NOTE — PROGRESS NOTES
4/21/2017 Care Coordination Closing:  Social Work     Care Coordinator attempted to contact patient on three different occasions and an unable to contact letter was mailed. Unfortunately, no contact was returned by the patient.    Plan: Patient is being closed to Care Coordination at this time. Patient can be re-referred to Care Coordination in the future, should the need arise.    Juliette Cavazos [facility]  , Clinic Care Coordination  Clinics:  [facility] [place], [place]  [phone]   4/21/2017   1:30 PM

## 2017-05-03 DIAGNOSIS — I10 ESSENTIAL HYPERTENSION WITH GOAL BLOOD PRESSURE LESS THAN 140/90: Chronic | ICD-10-CM

## 2017-05-03 DIAGNOSIS — E55.9 VITAMIN D DEFICIENCY: ICD-10-CM

## 2017-05-03 LAB
ANION GAP SERPL CALCULATED.3IONS-SCNC: 10 MMOL/L (ref 3–14)
BUN SERPL-MCNC: 16 MG/DL (ref 7–30)
CALCIUM SERPL-MCNC: 9.5 MG/DL (ref 8.5–10.1)
CHLORIDE SERPL-SCNC: 108 MMOL/L (ref 94–109)
CO2 SERPL-SCNC: 26 MMOL/L (ref 20–32)
CREAT SERPL-MCNC: 1.19 MG/DL (ref 0.66–1.25)
GFR SERPL CREATININE-BSD FRML MDRD: 66 ML/MIN/1.7M2
GLUCOSE SERPL-MCNC: 99 MG/DL (ref 70–99)
POTASSIUM SERPL-SCNC: 4.1 MMOL/L (ref 3.4–5.3)
SODIUM SERPL-SCNC: 144 MMOL/L (ref 133–144)

## 2017-05-03 PROCEDURE — 82306 VITAMIN D 25 HYDROXY: CPT | Performed by: FAMILY MEDICINE

## 2017-05-03 PROCEDURE — 80048 BASIC METABOLIC PNL TOTAL CA: CPT | Performed by: FAMILY MEDICINE

## 2017-05-03 PROCEDURE — 36415 COLL VENOUS BLD VENIPUNCTURE: CPT | Performed by: FAMILY MEDICINE

## 2017-05-04 LAB — DEPRECATED CALCIDIOL+CALCIFEROL SERPL-MC: 30 UG/L (ref 20–75)

## 2017-05-15 ENCOUNTER — ALLIED HEALTH/NURSE VISIT (OUTPATIENT)
Dept: PHARMACY | Facility: CLINIC | Age: 42
End: 2017-05-15
Payer: COMMERCIAL

## 2017-05-15 DIAGNOSIS — J45.21 INTERMITTENT ASTHMA, WITH ACUTE EXACERBATION: ICD-10-CM

## 2017-05-15 DIAGNOSIS — G89.4 CHRONIC PAIN SYNDROME: ICD-10-CM

## 2017-05-15 DIAGNOSIS — F31.81 BIPOLAR 2 DISORDER (H): Primary | ICD-10-CM

## 2017-05-15 DIAGNOSIS — G47.00 INSOMNIA, UNSPECIFIED TYPE: ICD-10-CM

## 2017-05-15 DIAGNOSIS — R73.03 PREDIABETES: ICD-10-CM

## 2017-05-15 PROCEDURE — 99607 MTMS BY PHARM ADDL 15 MIN: CPT | Mod: U4 | Performed by: PHARMACIST

## 2017-05-15 PROCEDURE — 99606 MTMS BY PHARM EST 15 MIN: CPT | Mod: U4 | Performed by: PHARMACIST

## 2017-05-15 RX ORDER — DESIPRAMINE HYDROCHLORIDE 50 MG/1
50 TABLET ORAL DAILY
COMMUNITY
End: 2017-06-02 | Stop reason: DRUGHIGH

## 2017-05-15 NOTE — MR AVS SNAPSHOT
After Visit Summary   5/15/2017    Joel Pineda    MRN: [id number]           Patient Information     Date Of Birth          1975        Visit Information        Provider Department      5/15/2017 3:00 PM Radha Mcdonald, [facility]        Today's Diagnoses     Bipolar 2 disorder (H)    -  1    Intermittent asthma, with acute exacerbation        Chronic pain syndrome        Insomnia, unspecified type        Prediabetes          Care Instructions    Due for a1c.        Follow-ups after your visit        Your next 10 appointments already scheduled     May 16, 2017 10:15 AM CDT   LAB with BK LAB   [facility] ([facility]    [address] [facility] [address] [phone]           Patient must bring picture ID.  Patient should be prepared to give a urine specimen  Please do not eat 10-12 hours before your appointment if you are coming in fasting for labs on lipids, cholesterol, or glucose (sugar).  Pregnant women should follow their Care Team instructions. Water with medications is okay. Do not drink coffee or other fluids.   If you have concerns about taking  your medications, please ask at office or if scheduling via [facility], send a message by clicking on Secure Messaging, Message Your Care Team.            Jul 10, 2017  4:00 PM CDT   TELEMEDICINE with Radha Mcdonald [facility] ([facility])    [address] [place] [id number] [phone]           Note: this is not an onsite visit; there is no need to come to the facility.              Future tests that were ordered for you today     Open Future Orders        Priority Expected Expires Ordered    Hemoglobin A1c Routine  5/15/2018 5/16/2017            Who to contact     If you have questions or need follow up information about today's clinic visit or your schedule please contact  [facility] directly at [phone].  Normal or non-critical lab and imaging results will be communicated to you by MyChart, letter or phone within 4 business days after the clinic has received the results. If you do not hear from us within 7 days, please contact the clinic through [employer] or phone. If you have a critical or abnormal lab result, we will notify you by phone as soon as possible.  Submit refill requests through [employer] or call your pharmacy and they will forward the refill request to us. Please allow 3 business days for your refill to be completed.          Additional Information About Your Visit        [employer] Information     [employer] gives you secure access to your electronic health record. If you see a primary care provider, you can also send messages to your care team and make appointments. If you have questions, please call your primary care clinic.  If you do not have a primary care provider, please call [phone] and they will assist you.        Care EveryWhere ID     This is your Care EveryWhere ID. This could be used by other organizations to access your [place] medical records  [id number]         Blood Pressure from Last 3 Encounters:   04/04/17 130/76   02/02/17 112/84   01/02/17 116/80    Weight from Last 3 Encounters:   02/02/17 296 lb 14.4 oz (134.7 kg)   01/02/17 293 lb 12.8 oz (133.3 kg)   12/07/16 295 lb (133.8 kg)                 Today's Medication Changes          These changes are accurate as of: 5/15/17 11:59 PM.  If you have any questions, ask your nurse or doctor.               These medicines have changed or have updated prescriptions.        Dose/Directions    cetirizine 10 MG tablet   Commonly known as:  zyrTEC   This may have changed:  when to take this   Used for:  Cough        Dose:  10 mg   Take 1 tablet (10 mg) by mouth At Bedtime   Quantity:  30 tablet   Refills:  11       FLUoxetine 20 MG capsule   Commonly known as:  PROzac   This may have  changed:  Another medication with the same name was removed. Continue taking this medication, and follow the directions you see here.        Dose:  20 mg   Take 20 mg by mouth daily   Refills:  0       pregabalin 100 MG capsule   Commonly known as:  LYRICA   This may have changed:    - when to take this  - additional instructions   Used for:  Chronic midline low back pain without sciatica        Dose:  100 mg   Take 1 capsule (100 mg) by mouth daily Morning dose, in addition to 100 mg in the evening.   Quantity:  90 capsule   Refills:  1                Primary Care Provider Office Phone # Fax #    Ksenia Shady Lyles [phone]       [address] [address]        Goals        General    I will continue to attend Psychiatry appointments for medication management, 1/14/2014 (pt-stated)     Notes - Note edited  8/24/2016  4:04 PM by Dalila Cavazos LSW    As of today's date 8/24/2016 goal is met at 51 - 75%.   Goal Status:  Ongoing Sees  Therapist every other week and Psych PA every 3 weeks.  As of today's date 5/25/2016 goal is met at 51 - 75%.   Goal Status:  Showing progress-  Meeting with Psych PA for second time tomorrow  To review meds As of today's date 4/11/2016 goal is met at 51 - 75%.   Goal Status:  Showing progress  As of today's date 1/14/2014 goal is met at 51 - 75%.   Goal Status:  Active        I will schedule therapy with new counselor, 1/14/2014 (pt-stated)     Notes - Note edited  5/25/2016  1:54 PM by Dalila Cavazos LSW    As of today's date 5/25/2016 goal is met at 76 - 100%.   Goal Status:  Ongoing New therapist and Psych PA on a regular basis  As of today's date 5/10/2016 goal is met at 76 - 100%.   Goal Status:  Ongoing met with new therapist at [facility]  As of today's date 4/11/2016 goal is met at 51 - 75%.   Goal Status:  Ongoing meets with therapist regularly  As of today's date 1/14/2014 goal is met at 0 - 25%.    Goal Status:  Active        Psychosocial I need some help with cleaning (pt-stated)     Notes - Note edited  6/23/2016  1:57 PM by Dalila Cavazos LSW    As of today's date 6/23/2016 goal is met at 26 - 50%.   Goal Status:  Showing progress met with [facility]. Has not yet made a decision  As of today's date 5/25/2016 goal is met at 0 - 25%.   Goal Status:  Ongoing agency had to reschedule appt.  As of today's date 5/10/2016 goal is met at 0 - 25%.   Goal Status:  Active referral to [facility]        Thank you!     Thank you for choosing [facility]  for your care. Our goal is always to provide you with excellent care. Hearing back from our patients is one way we can continue to improve our services. Please take a few minutes to complete the written survey that you may receive in the mail after your visit with us. Thank you!             Your Updated Medication List - Protect others around you: Learn how to safely use, store and throw away your medicines at www.disposemymeds.org.          This list is accurate as of: 5/15/17 11:59 PM.  Always use your most recent med list.                   Brand Name Dispense Instructions for use    acetaminophen 500 MG Caps     60 capsule    Take 500 mg by mouth every 4 hours as needed       albuterol 108 (90 BASE) MCG/ACT Inhaler    PROAIR HFA/PROVENTIL HFA/VENTOLIN HFA     Inhale 2 puffs into the lungs every 4 hours as needed       atenolol 50 MG tablet    TENORMIN    90 tablet    TAKE 1 TABLET BY MOUTH EVERY DAY       atorvastatin 40 MG tablet    LIPITOR    90 tablet    Take 1 tablet (40 mg) by mouth daily       budesonide 0.25 MG/2ML neb solution    PULMICORT     Irrigate sinus with 0.25mg of budesonide bid along with saline solution       calcium citrate + Tabs      250mg bid       CALMOL-4 76-10 % Supp     30 suppository    Place 1 suppository rectally daily as needed       celecoxib 200 MG capsule    celeBREX    180 capsule    TAKE 1 CAPSULE BY MOUTH TWICE  DAILY AS NEEDED FORMODERATE PAIN       cetirizine 10 MG tablet    zyrTEC    30 tablet    Take 1 tablet (10 mg) by mouth At Bedtime       clonazePAM 1 MG tablet    klonoPIN    60 tablet    0.5 mg 2 times daily as needed       cyanocobalamin 1000 MCG/ML injection    VITAMIN B12    0.9 mL    Inject 1 mL (1,000 mcg) into the muscle every 30 days       desipramine 50 MG tablet    NORPRAMIN     Take 50 mg by mouth daily       dicyclomine 20 MG tablet    BENTYL    120 tablet    Take 1 tablet (20 mg) by mouth 4 times daily as needed       EPINEPHrine 0.3 MG/0.3ML injection     0.6 mL    Inject 0.3 mLs (0.3 mg) into the muscle once as needed for anaphylaxis       fenofibrate 145 MG tablet     90 tablet    Take 1 tablet (145 mg) by mouth daily       FLUoxetine 20 MG capsule    PROzac     Take 20 mg by mouth daily       levothyroxine 75 MCG tablet    SYNTHROID/LEVOTHROID    90 tablet    TAKE 1 TABLET BY MOUTH EVERY MORNING       lidocaine 5 % ointment    XYLOCAINE    250 g    Apply topically 3 times daily as needed for moderate pain Apply as directed       lithium 450 MG CR tablet    ESKALITH    75 tablet    Take 3 tablets (1,350 mg) by mouth At Bedtime       metaxalone 800 MG tablet    SKELAXIN    90 tablet    TAKE ONE TABLET BY MOUTH THREE TIMES DAILY.       metFORMIN 500 MG 24 hr tablet    GLUCOPHAGE-XR    90 tablet    Take 1 tablet (500 mg) by mouth daily (with dinner)       omeprazole 20 MG tablet      Take 20 mg by mouth 2 times daily       ondansetron 8 MG tablet    ZOFRAN    20 tablet    TAKE 1 TABLET (8 MG) BY MOUTH EVERY 8 HOURS AS NEEDED FOR NAUSEA/VOMITING.       * order for DME     12 each    Equipment being ordered: 1 ml tuberculin syringes to be used for Vitamin B12 injections.       * order for DME     1 Units    SI-loc belt       * order for DME      Respironics REMSTAR 60 Series Auto CPAP 10-12 cm H2O, Wisp nasal mask w/a large cushion and a chinstrap       oxyCODONE 5 MG IR tablet    ROXICODONE    35 tablet     Take 1-2 tablets (5-10 mg) by mouth every 6 hours as needed for pain (maximum 4 tablet(s) per day) . For post-procedure pain- this replaces your regular short-acting script.       pregabalin 100 MG capsule    LYRICA    90 capsule    Take 1 capsule (100 mg) by mouth daily Morning dose, in addition to 100 mg in the evening.       prochlorperazine 10 MG tablet    COMPAZINE    30 tablet    Take 1 tablet (10 mg) by mouth 3 times daily as needed for nausea       ramipril 5 MG capsule    ALTACE    90 capsule    Take 1 capsule (5 mg) by mouth daily       triamcinolone 0.1 % cream    KENALOG     Apply topically as needed for irritation       vitamin D3 51459 UNITS capsule    CHOLECALCIFEROL    24 capsule    Take one capsule every two weeks.       ziprasidone 80 MG capsule    GEODON     Take 80 mg by mouth 2 times daily       * Notice:  This list has 3 medication(s) that are the same as other medications prescribed for you. Read the directions carefully, and ask your doctor or other care provider to review them with you.

## 2017-05-15 NOTE — PROGRESS NOTES
SUBJECTIVE/OBJECTIVE:                                                    Joel Pineda is a 41 year old male called for follow up visit for Medication Therapy Management.  He was referred to me from Ksenia Lyles.     Chief Complaint: Medication Review.    Allergies/ADRs: Reviewed in Epic  Tobacco: No tobacco use   Alcohol: none  PMH: Reviewed in Epic    Medication Adherence: no issues reported    Mental Health: current therapy includes desipramine 50mg once daily in the morning,  fluoxetine 20mg daily, lithium 1350mg daily. geodon 80mg bid and Geodon has been effective for visual and auditory hallucinations. He does have breakthrough hallucinations but feels for the most part they are well controlled.  Mood has been pretty low but is very slowly improving.  Patient continues to follow with [facility]. Patient continues to see therapist every 2 weeks. Has lithium level tomorrow. Patient is having issues with his memory. For example he forgets how to tie his shoes or forgets how to get out of the bathtub. This is a recent development over the last 2 days. Memory has not always been great but it seems to be worse.      Asthma:  Current asthma medications: none.  Pt reports the following symptoms: none  Patient is no longer using any medications for his breathing. Patient is not having any issues with breathing. Patient only has his rescue inhaler that he uses prior to activity. Patient has no shortness of breath or waking up at night due to asthma symptoms. Patient self discontinued singulair and has not seen any increase in symptoms.    Pain: current therapy oxycodone 5mg as needed (1-2 tablets per day), lyrica 100mg bid, skelaxin 800mg tiw. Pain is better controlled. Patient is trying to decrease his lyrica dose ot 100mg bid x 1 week. Back pain has increased so he has been taking more skelaxin 800-1600mg/day and 1-2 oxycodone per day.    Insomina: patient stopped belsomra and is using clonazepam 0.5mg at bedtime  and as needed for anxiety. Patient is sleeping ok. He is also wearing an appliance for sleep apnea.    Pre-Diabetes: current therapy includes metformin xr 500mg with dinner. Patient is not testing blood sugars.    Current labs include:  BP Readings from Last 3 Encounters:   04/04/17 130/76   02/02/17 112/84   01/02/17 116/80     Today's Vitals: There were no vitals taken for this visit.  Lab Results   Component Value Date    A1C 5.5 09/01/2016   .  Lab Results   Component Value Date    CHOL 165 02/24/2017     Lab Results   Component Value Date    TRIG 326 02/24/2017     Lab Results   Component Value Date    HDL 33 02/24/2017     Lab Results   Component Value Date    LDL 67 02/24/2017       Liver Function Studies -   Recent Labs   Lab Test  01/03/17   0825   PROTTOTAL  7.2   ALBUMIN  4.2   BILITOTAL  0.3   ALKPHOS  66   AST  18   ALT  27       Lab Results   Component Value Date    UCRR 46 11/01/2016    MICROL 11 11/01/2016    UMALCR 24.56 (H) 11/01/2016       Last Basic Metabolic Panel:  Lab Results   Component Value Date     05/03/2017      Lab Results   Component Value Date    POTASSIUM 4.1 05/03/2017     Lab Results   Component Value Date    CHLORIDE 108 05/03/2017     Lab Results   Component Value Date    BUN 16 05/03/2017     Lab Results   Component Value Date    CR 1.19 05/03/2017     GFR Estimate   Date Value Ref Range Status   05/03/2017 66 >60 mL/min/1.7m2 Final     Comment:     Non  GFR Calc   01/03/2017 59 (L) >60 mL/min/1.7m2 Final     Comment:     Non  GFR Calc   11/01/2016 73 >60 mL/min/1.7m2 Final     Comment:     Non  GFR Calc     GFR Estimate If Black   Date Value Ref Range Status   05/03/2017 80 >60 mL/min/1.7m2 Final     Comment:      GFR Calc   01/03/2017 71 >60 mL/min/1.7m2 Final     Comment:      GFR Calc   11/01/2016 88 >60 mL/min/1.7m2 Final     Comment:      GFR Calc     TSH   Date Value Ref  Range Status   11/01/2016 2.76 0.40 - 4.00 mU/L Final   ]    Most Recent Immunizations   Administered Date(s) Administered     Influenza (IIV3) 09/09/2013     Influenza Vaccine IM 3yrs+ 4 Valent IIV4 10/11/2016     Pneumococcal (PCV 13) 10/02/2015     Pneumococcal 23 valent 10/11/2016     TD (ADULT, 7+) 10/04/2002     TDAP Vaccine (Adacel) 07/16/2010         ASSESSMENT:                                                       Current medications were reviewed today.     Medication Adherence: no issues identified    Mental Health: stable    Asthma: Stable. Needs updated ACT. Will mail to patient.    Pain: worsening with decrease in lyrica dose. Patient would like to try on this dose a little longer. Memory issues could be related to increase in skelaxin use.    Insomnia: stable    Pre-Diabetes: patient due for A1c.     PLAN:                                                      Patient due for A1c.  Patient due for ACT-mailed copy to patient.    I spent 30 minutes with this patient today. All changes were made via collaborative practice agreement with Ksenia Lyles. A copy of the visit note was provided to the patient's primary care provider.    Will follow up in 6 weeks.    The patient was sent via [employer] a summary of these recommendations as an after visit summary.     Radha Mcdonald, Pharm.D, BCACP  Medication Therapy Management Pharmacist

## 2017-05-16 DIAGNOSIS — F31.5 BIPOLAR I DISORDER, MOST RECENT EPISODE (OR CURRENT) DEPRESSED, SEVERE, SPECIFIED AS WITH PSYCHOTIC BEHAVIOR (H): Primary | ICD-10-CM

## 2017-05-16 DIAGNOSIS — R73.03 PREDIABETES: ICD-10-CM

## 2017-05-16 LAB
HBA1C MFR BLD: 5.5 % (ref 4.3–6)
LITHIUM SERPL-SCNC: 0.6 MMOL/L (ref 0.6–1.2)

## 2017-05-16 PROCEDURE — 36415 COLL VENOUS BLD VENIPUNCTURE: CPT | Performed by: FAMILY MEDICINE

## 2017-05-16 PROCEDURE — 80178 ASSAY OF LITHIUM: CPT | Performed by: FAMILY MEDICINE

## 2017-05-16 PROCEDURE — 83036 HEMOGLOBIN GLYCOSYLATED A1C: CPT | Performed by: FAMILY MEDICINE

## 2017-05-22 ENCOUNTER — TELEPHONE (OUTPATIENT)
Dept: PHARMACY | Facility: CLINIC | Age: 42
End: 2017-05-22

## 2017-05-22 NOTE — TELEPHONE ENCOUNTER
Patient called to see if there were any lab tests to see if he has any other vitamin deficiencies as he currently has a vitamin D deficiency. Recommend patient discuss with provider, specific concerns, at his next visit.    Radha Mcdonald, Pharm.D, BCACP  Medication Therapy Management Pharmacist

## 2017-05-31 ENCOUNTER — TELEPHONE (OUTPATIENT)
Dept: FAMILY MEDICINE | Facility: CLINIC | Age: 42
End: 2017-05-31

## 2017-05-31 NOTE — TELEPHONE ENCOUNTER
[facility], [phone] called regarding a possible drug interaction between:    tiZANidine (ZANAFLEX) 4 MG tablet    ziprasidone (GEODON) 80 MG capsule    Please call to advise.     Thank you,    Magali Borrego

## 2017-06-01 ENCOUNTER — NURSE TRIAGE (OUTPATIENT)
Dept: NURSING | Facility: CLINIC | Age: 42
End: 2017-06-01

## 2017-06-02 ENCOUNTER — OFFICE VISIT (OUTPATIENT)
Dept: FAMILY MEDICINE | Facility: CLINIC | Age: 42
End: 2017-06-02
Payer: COMMERCIAL

## 2017-06-02 VITALS
HEIGHT: 77 IN | TEMPERATURE: 98.2 F | DIASTOLIC BLOOD PRESSURE: 68 MMHG | RESPIRATION RATE: 16 BRPM | BODY MASS INDEX: 35.69 KG/M2 | WEIGHT: 302.3 LBS | SYSTOLIC BLOOD PRESSURE: 118 MMHG | HEART RATE: 114 BPM | OXYGEN SATURATION: 96 %

## 2017-06-02 DIAGNOSIS — M47.819 FACET ARTHROPATHY: ICD-10-CM

## 2017-06-02 DIAGNOSIS — J01.90 ACUTE NON-RECURRENT SINUSITIS, UNSPECIFIED LOCATION: ICD-10-CM

## 2017-06-02 DIAGNOSIS — L29.9 EAR ITCHING: Primary | ICD-10-CM

## 2017-06-02 PROCEDURE — 99213 OFFICE O/P EST LOW 20 MIN: CPT | Performed by: FAMILY MEDICINE

## 2017-06-02 RX ORDER — DOXYCYCLINE 100 MG/1
100 TABLET ORAL 2 TIMES DAILY
Qty: 20 TABLET | Refills: 0 | Status: SHIPPED | OUTPATIENT
Start: 2017-06-02 | End: 2017-06-12

## 2017-06-02 RX ORDER — DESIPRAMINE HYDROCHLORIDE 75 MG/1
TABLET ORAL DAILY
COMMUNITY
End: 2017-07-10 | Stop reason: SINTOL

## 2017-06-02 RX ORDER — HYDROXYZINE PAMOATE 50 MG/1
50-100 CAPSULE ORAL 2 TIMES DAILY
COMMUNITY
End: 2018-02-05

## 2017-06-02 RX ORDER — ACETIC ACID 20.65 MG/ML
3-4 SOLUTION AURICULAR (OTIC) 4 TIMES DAILY PRN
Qty: 15 ML | Refills: 2 | Status: SHIPPED | OUTPATIENT
Start: 2017-06-02 | End: 2017-07-10

## 2017-06-02 RX ORDER — OXYCODONE HYDROCHLORIDE 5 MG/1
5-10 TABLET ORAL EVERY 6 HOURS PRN
Qty: 35 TABLET | Refills: 0 | Status: SHIPPED | OUTPATIENT
Start: 2017-06-02 | End: 2017-08-14

## 2017-06-02 ASSESSMENT — PAIN SCALES - GENERAL: PAINLEVEL: NO PAIN (0)

## 2017-06-02 NOTE — TELEPHONE ENCOUNTER
Spoke with the pharmacy (karine) and informed of message below. She will inform pharmacist    Lora Wright MA

## 2017-06-02 NOTE — TELEPHONE ENCOUNTER
Reason for Disposition    [1] Taking antihistamines > 2 days AND [2] nasal allergy symptoms interfere with sleep, school, or work    Additional Information    Negative: Ear pain is main symptom    Negative: Hearing loss (complete or partial) is main symptom    Negative: Earwax is the main concern    [1] Has nasal allergies AND [2] they are acting up    Negative: [1] Wheezing (high pitched whistling sound) AND [2] previous asthma attacks or use of asthma medicines    Negative: [1] Wheezing (high pitched whistling sound) AND [2] no history of asthma    Negative: Eye redness and itching are the only symptoms    Negative: Doesn't match the SYMPTOMS for Hay Fever    Negative: Patient sounds very sick or weak to the triager    Negative: Lots of coughing    Negative: [1] Lots of yellow or green discharge from nose AND [2] present > 3 days    Commented on: Answer Assessment - Initial Assessment Questions     Pt reports both ears are itchy and congestive and he also has sinus pressure.    Protocols used: NASAL ALLERGIES (HAY FEVER)-ADULT-, EAR - CONGESTION-ADULT-

## 2017-06-02 NOTE — PROGRESS NOTES
"  SUBJECTIVE:                                                    Joel Pineda is a 41 year old male who presents to clinic today for the following health issues:      Acute Illness   Acute illness concerns: bilateral - itching, congestion  Onset: 2 weeks + and congestion has been a few days    Fever: no    Chills/Sweats: YES- related to medication use?    Headache (location?): no    Sinus Pressure:YES    Conjunctivitis:  no    Ear Pain: YES- itching, not pain    Rhinorrhea: YES    Congestion: YES    Sore Throat: no     Cough: no    Wheeze: no    Decreased Appetite: no    Nausea: no    Vomiting: no    Diarrhea:  no    Dysuria/Freq.: no    Fatigue/Achiness: YES- fatigue    Sick/Strep Exposure: no     Therapies Tried and outcome: Vistaril, Zyrtec    SUBJECTIVE:  Here with the above symptoms.  But at this point he is not certain that this is a full blown sinus infection.  Biggest current issue is very itchy ears.  Trying to use ointment on a qtip.  No discharge.  Back still a chronic issue.    Review of systems otherwise negative.  Past medical, family, and social history reviewed and updated in chart.    OBJECTIVE:  /68 (BP Location: Right arm, Patient Position: Right side, Cuff Size: Adult Regular)  Pulse 114  Temp 98.2  F (36.8  C) (Oral)  Resp 16  Ht 1.956 m (6' 5\")  Wt (!) 137.1 kg (302 lb 4.8 oz)  SpO2 96%  BMI 35.85 kg/m2  Alert, pleasant, upbeat, and in no apparent discomfort.  Ears normal. Throat and pharynx normal. Neck supple. No adenopathy or masses in the neck or supraclavicular regions. Sinuses non tender.  S1 and S2 normal, no murmurs, clicks, gallops or rubs. Regular rate and rhythm. Chest is clear; no wheezes or rales. No edema or JVD.   Past labs reviewed with the patient.     ASSESSMENT / PLAN:  (L29.9) Ear itching  (primary encounter diagnosis)  Comment: Discussed mechanism of action of the proposed medication, as well as potential effects, both good and bad.  Patient expressed " understanding and agreed with treatment.   Plan: acetic acid (VOSOL) 2 % otic solution            (J01.90) Acute non-recurrent sinusitis, unspecified location  Comment: hold onto prescription for now   Plan: doxycycline Monohydrate 100 MG TABS            (M12.88) Facet arthropathy (H)  Comment: refilled   Plan: oxyCODONE (ROXICODONE) 5 MG IR tablet             Follow up 3 months   JA Lyles MD    (Chart documentation completed in part with Dragon voice-recognition software.  Even though reviewed some grammatical, spelling, and word errors may remain.)

## 2017-06-02 NOTE — NURSING NOTE
"Chief Complaint   Patient presents with     URI       Initial /68 (BP Location: Right arm, Patient Position: Right side, Cuff Size: Adult Regular)  Pulse 114  Temp 98.2  F (36.8  C) (Oral)  Resp 16  Ht 1.956 m (6' 5\")  Wt (!) 137.1 kg (302 lb 4.8 oz)  SpO2 96%  BMI 35.85 kg/m2 Estimated body mass index is 35.85 kg/(m^2) as calculated from the following:    Height as of this encounter: 1.956 m (6' 5\").    Weight as of this encounter: 137.1 kg (302 lb 4.8 oz).  Medication Reconciliation: complete     Will Benoit WINKLER      "

## 2017-06-02 NOTE — MR AVS SNAPSHOT
After Visit Summary   6/2/2017    Joel Pineda    MRN: [id number]           Patient Information     Date Of Birth          1975        Visit Information        Provider Department      6/2/2017 11:40 AM Ksenia Lyles MD [facility]        Today's Diagnoses     Ear itching    -  1    Acute non-recurrent sinusitis, unspecified location        Facet arthropathy (H)           Follow-ups after your visit        Follow-up notes from your care team     Return in about 3 months (around 9/2/2017).      Your next 10 appointments already scheduled     Jul 10, 2017  4:00 PM CDT   TELEMEDICINE with Radha Mcdonald [facility] ([facility]    [address] [address] [place] [address] [place]  [address] [facility]  [facility] [address] [phone]           Note: this is not an onsite visit; there is no need to come to the facility.              Who to contact     If you have questions or need follow up information about today's clinic visit or your schedule please contact [facility] directly at [phone].  Normal or non-critical lab and imaging results will be communicated to you by [employer] [facility], letter or phone within 4 business days after the clinic has received the results. If you do not hear from us within 7 days, please contact the clinic through [employer] [facility] or phone. If you have a critical or abnormal lab result, we will notify you by phone as soon as possible.  Submit refill requests through [facility] or call your pharmacy and they will forward the refill request to us. Please allow 3 business days for your refill to be completed.          Additional Information About Your Visit        [employer] [facility] Information     [facility] gives you secure access to your electronic health record. If you see a primary care provider, you can also send messages to your care team and make appointments. If you have questions, please call your primary care clinic.  If you do not  "have a primary care provider, please call [phone] and they will assist you.        Care EveryWhere ID     This is your Care EveryWhere ID. This could be used by other organizations to access your [facility] medical records  [id number]        Your Vitals Were     Pulse Temperature Respirations Height Pulse Oximetry BMI (Body Mass Index)    114 98.2  F (36.8  C) (Oral) 16 1.956 m (6' 5\") 96% 35.85 kg/m2       Blood Pressure from Last 3 Encounters:   06/02/17 118/68   04/04/17 130/76   02/02/17 112/84    Weight from Last 3 Encounters:   06/02/17 (!) 137.1 kg (302 lb 4.8 oz)   02/02/17 134.7 kg (296 lb 14.4 oz)   01/02/17 133.3 kg (293 lb 12.8 oz)              Today, you had the following     No orders found for display         Today's Medication Changes          These changes are accurate as of: 6/2/17 11:59 PM.  If you have any questions, ask your nurse or doctor.               Start taking these medicines.        Dose/Directions    acetic acid 2 % otic solution   Commonly known as:  VOSOL   Used for:  Ear itching   Started by:  Ksenia Lyles MD        Dose:  3-4 drop   Place 3-4 drops into both ears 4 times daily as needed   Quantity:  15 mL   Refills:  2       doxycycline Monohydrate 100 MG Tabs   Used for:  Acute non-recurrent sinusitis, unspecified location   Started by:  Ksenia Lyles MD        Dose:  100 mg   Take 100 mg by mouth 2 times daily for 10 days   Quantity:  20 tablet   Refills:  0         These medicines have changed or have updated prescriptions.        Dose/Directions    cetirizine 10 MG tablet   Commonly known as:  zyrTEC   This may have changed:  when to take this   Used for:  Cough        Dose:  10 mg   Take 1 tablet (10 mg) by mouth At Bedtime   Quantity:  30 tablet   Refills:  11       desipramine HCl 75 MG Tabs   This may have changed:  Another medication with the same name was removed. Continue taking this medication, and follow the directions you see here.   Changed " by:  Ksenia Lyles MD        Take by mouth daily   Refills:  0       oxyCODONE 5 MG IR tablet   Commonly known as:  ROXICODONE   This may have changed:  additional instructions   Used for:  Facet arthropathy (H)   Changed by:  Ksenia Lyles MD        Dose:  5-10 mg   Take 1-2 tablets (5-10 mg) by mouth every 6 hours as needed for pain (maximum 4 tablet(s) per day)   Quantity:  35 tablet   Refills:  0       pregabalin 100 MG capsule   Commonly known as:  LYRICA   This may have changed:    - when to take this  - additional instructions   Used for:  Chronic midline low back pain without sciatica        Dose:  100 mg   Take 1 capsule (100 mg) by mouth daily Morning dose, in addition to 100 mg in the evening.   Quantity:  90 capsule   Refills:  1            Where to get your medicines      These medications were sent to [id number] - [place], [place] - [address]  [address], [facility] [address]     Phone:  [phone]     acetic acid 2 % otic solution         Some of these will need a paper prescription and others can be bought over the counter.  Ask your nurse if you have questions.     Bring a paper prescription for each of these medications     doxycycline Monohydrate 100 MG Tabs    oxyCODONE 5 MG IR tablet                Primary Care Provider Office Phone # Fax #    Ksenia Lyles [phone]       [address] [address]        Goals        General    I will continue to attend Psychiatry appointments for medication management, 1/14/2014 (pt-stated)     Notes - Note edited  8/24/2016  4:04 PM by Dalila Cavazos LSW    As of today's date 8/24/2016 goal is met at 51 - 75%.   Goal Status:  Ongoing Sees  Therapist every other week and Psych PA every 3 weeks.  As of today's date 5/25/2016 goal is met at 51 - 75%.   Goal Status:  Showing progress-  Meeting with Psych PA for second time  tomorrow  To review meds As of today's date 4/11/2016 goal is met at 51 - 75%.   Goal Status:  Showing progress  As of today's date 1/14/2014 goal is met at 51 - 75%.   Goal Status:  Active        I will schedule therapy with new counselor, 1/14/2014 (pt-stated)     Notes - Note edited  5/25/2016  1:54 PM by Dalila Cavazos LSW    As of today's date 5/25/2016 goal is met at 76 - 100%.   Goal Status:  Ongoing New therapist and Psych PA on a regular basis  As of today's date 5/10/2016 goal is met at 76 - 100%.   Goal Status:  Ongoing met with new therapist at [facility]  As of today's date 4/11/2016 goal is met at 51 - 75%.   Goal Status:  Ongoing meets with therapist regularly  As of today's date 1/14/2014 goal is met at 0 - 25%.   Goal Status:  Active        Psychosocial I need some help with cleaning (pt-stated)     Notes - Note edited  6/23/2016  1:57 PM by Dalila Cavazos LSW    As of today's date 6/23/2016 goal is met at 26 - 50%.   Goal Status:  Showing progress met with [employer]. Has not yet made a decision  As of today's date 5/25/2016 goal is met at 0 - 25%.   Goal Status:  Ongoing agency had to reschedule appt.  As of today's date 5/10/2016 goal is met at 0 - 25%.   Goal Status:  Active referral to [facility]        Thank you!     Thank you for choosing [facility]  for your care. Our goal is always to provide you with excellent care. Hearing back from our patients is one way we can continue to improve our services. Please take a few minutes to complete the written survey that you may receive in the mail after your visit with us. Thank you!             Your Updated Medication List - Protect others around you: Learn how to safely use, store and throw away your medicines at www.disposemymeds.org.          This list is accurate as of: 6/2/17 11:59 PM.  Always use your most recent med list.                   Brand Name Dispense Instructions for use    acetaminophen 500 MG Caps     60 capsule     Take 500 mg by mouth every 4 hours as needed       acetic acid 2 % otic solution    VOSOL    15 mL    Place 3-4 drops into both ears 4 times daily as needed       albuterol 108 (90 BASE) MCG/ACT Inhaler    PROAIR HFA/PROVENTIL HFA/VENTOLIN HFA     Inhale 2 puffs into the lungs every 4 hours as needed       atenolol 50 MG tablet    TENORMIN    90 tablet    TAKE 1 TABLET BY MOUTH EVERY DAY       atorvastatin 40 MG tablet    LIPITOR    90 tablet    Take 1 tablet (40 mg) by mouth daily       budesonide 0.25 MG/2ML neb solution    PULMICORT     Irrigate sinus with 0.25mg of budesonide bid along with saline solution       calcium citrate + Tabs      250mg bid       CALMOL-4 76-10 % Supp     30 suppository    Place 1 suppository rectally daily as needed       celecoxib 200 MG capsule    celeBREX    180 capsule    TAKE 1 CAPSULE BY MOUTH TWICE DAILY AS NEEDED FORMODERATE PAIN       cetirizine 10 MG tablet    zyrTEC    30 tablet    Take 1 tablet (10 mg) by mouth At Bedtime       clonazePAM 1 MG tablet    klonoPIN    60 tablet    0.5 mg 2 times daily as needed       cyanocobalamin 1000 MCG/ML injection    VITAMIN B12    0.9 mL    Inject 1 mL (1,000 mcg) into the muscle every 30 days       desipramine HCl 75 MG Tabs      Take by mouth daily       dicyclomine 20 MG tablet    BENTYL    120 tablet    Take 1 tablet (20 mg) by mouth 4 times daily as needed       doxycycline Monohydrate 100 MG Tabs     20 tablet    Take 100 mg by mouth 2 times daily for 10 days       EPINEPHrine 0.3 MG/0.3ML injection     0.6 mL    Inject 0.3 mLs (0.3 mg) into the muscle once as needed for anaphylaxis       fenofibrate 145 MG tablet     90 tablet    Take 1 tablet (145 mg) by mouth daily       FLUoxetine 20 MG capsule    PROzac     Take 20 mg by mouth daily       levothyroxine 75 MCG tablet    SYNTHROID/LEVOTHROID    90 tablet    TAKE 1 TABLET BY MOUTH EVERY MORNING       lidocaine 5 % ointment    XYLOCAINE    250 g    Apply topically 3 times daily  as needed for moderate pain Apply as directed       lithium 450 MG CR tablet    ESKALITH    75 tablet    Take 3 tablets (1,350 mg) by mouth At Bedtime       metFORMIN 500 MG 24 hr tablet    GLUCOPHAGE-XR    90 tablet    Take 1 tablet (500 mg) by mouth daily (with dinner)       omeprazole 20 MG tablet      Take 20 mg by mouth 2 times daily       ondansetron 8 MG tablet    ZOFRAN    20 tablet    TAKE 1 TABLET (8 MG) BY MOUTH EVERY 8 HOURS AS NEEDED FOR NAUSEA/VOMITING.       * order for DME     12 each    Equipment being ordered: 1 ml tuberculin syringes to be used for Vitamin B12 injections.       * order for DME     1 Units    SI-loc belt       * order for DME      Respironics REMSTAR 60 Series Auto CPAP 10-12 cm H2O, Wisp nasal mask w/a large cushion and a chinstrap       oxyCODONE 5 MG IR tablet    ROXICODONE    35 tablet    Take 1-2 tablets (5-10 mg) by mouth every 6 hours as needed for pain (maximum 4 tablet(s) per day)       pregabalin 100 MG capsule    LYRICA    90 capsule    Take 1 capsule (100 mg) by mouth daily Morning dose, in addition to 100 mg in the evening.       prochlorperazine 10 MG tablet    COMPAZINE    30 tablet    Take 1 tablet (10 mg) by mouth 3 times daily as needed for nausea       ramipril 5 MG capsule    ALTACE    90 capsule    Take 1 capsule (5 mg) by mouth daily       tiZANidine 4 MG tablet    ZANAFLEX    90 tablet    Take 1 tablet (4 mg) by mouth 3 times daily as needed for muscle spasms       triamcinolone 0.1 % cream    KENALOG     Apply topically as needed for irritation       VISTARIL 50 MG capsule   Generic drug:  hydrOXYzine      Take 50 mg by mouth as needed for itching       vitamin D3 74435 UNITS capsule    CHOLECALCIFEROL    24 capsule    Take one capsule every two weeks.       ziprasidone 80 MG capsule    GEODON     Take 80 mg by mouth 2 times daily       * Notice:  This list has 3 medication(s) that are the same as other medications prescribed for you. Read the directions  carefully, and ask your doctor or other care provider to review them with you.

## 2017-06-13 ENCOUNTER — MYC MEDICAL ADVICE (OUTPATIENT)
Dept: FAMILY MEDICINE | Facility: CLINIC | Age: 42
End: 2017-06-13

## 2017-06-13 DIAGNOSIS — G89.29 CHRONIC MIDLINE LOW BACK PAIN WITHOUT SCIATICA: ICD-10-CM

## 2017-06-13 DIAGNOSIS — M54.50 CHRONIC MIDLINE LOW BACK PAIN WITHOUT SCIATICA: ICD-10-CM

## 2017-06-13 RX ORDER — PREGABALIN 100 MG/1
100 CAPSULE ORAL 2 TIMES DAILY
Qty: 60 CAPSULE | Refills: 0 | Status: SHIPPED | OUTPATIENT
Start: 2017-06-13 | End: 2017-08-21

## 2017-06-23 DIAGNOSIS — I51.9 MYXEDEMA HEART DISEASE: ICD-10-CM

## 2017-06-23 DIAGNOSIS — E03.9 MYXEDEMA HEART DISEASE: ICD-10-CM

## 2017-06-26 RX ORDER — LEVOTHYROXINE SODIUM 75 UG/1
TABLET ORAL
Qty: 90 TABLET | Refills: 0 | Status: SHIPPED | OUTPATIENT
Start: 2017-06-26 | End: 2017-09-23

## 2017-06-26 NOTE — TELEPHONE ENCOUNTER
levothyroxine (SYNTHROID, LEVOTHROID) 75 MCG tablet     Last Written Prescription Date: 9/27/16  Last Quantity: 90, # refills: 2  Last Office Visit with [facility], [facility] or [facility] prescribing provider: 6/2/17        TSH   Date Value Ref Range Status   11/01/2016 2.76 0.40 - 4.00 mU/L Final

## 2017-07-07 DIAGNOSIS — E78.2 MIXED HYPERLIPIDEMIA: ICD-10-CM

## 2017-07-07 NOTE — TELEPHONE ENCOUNTER
fenofibrate 145 MG tablet       Last Written Prescription Date: 01/02/17  Last Fill Quantity: 90, # refills: 1    [place] [facility] [place] [facility] or [facility] prescribing provider:  06/02/17 Dr. Lyles  Future Office Visit:      Cholesterol   Date Value Ref Range Status   02/24/2017 165 <200 mg/dL Final     HDL Cholesterol   Date Value Ref Range Status   02/24/2017 33 (L) >39 mg/dL Final     LDL Cholesterol Calculated   Date Value Ref Range Status   02/24/2017 67 <100 mg/dL Final     Comment:     Desirable:       <100 mg/dl     Triglycerides   Date Value Ref Range Status   02/24/2017 326 (H) <150 mg/dL Final     Comment:     Borderline high:  150-199 mg/dl   High:             200-499 mg/dl   Very high:       >499 mg/dl   Fasting specimen       Cholesterol/HDL Ratio   Date Value Ref Range Status   12/03/2014 7.0 (H) 0.0 - 5.0 Final     ALT   Date Value Ref Range Status   01/03/2017 27 0 - 70 U/L Final

## 2017-07-10 ENCOUNTER — ALLIED HEALTH/NURSE VISIT (OUTPATIENT)
Dept: PHARMACY | Facility: CLINIC | Age: 42
End: 2017-07-10
Payer: COMMERCIAL

## 2017-07-10 ENCOUNTER — OFFICE VISIT (OUTPATIENT)
Dept: FAMILY MEDICINE | Facility: CLINIC | Age: 42
End: 2017-07-10
Payer: COMMERCIAL

## 2017-07-10 VITALS
OXYGEN SATURATION: 94 % | HEIGHT: 77 IN | TEMPERATURE: 98.6 F | SYSTOLIC BLOOD PRESSURE: 132 MMHG | WEIGHT: 306.3 LBS | BODY MASS INDEX: 36.17 KG/M2 | HEART RATE: 80 BPM | DIASTOLIC BLOOD PRESSURE: 88 MMHG

## 2017-07-10 DIAGNOSIS — L70.0 CYSTIC ACNE: Primary | ICD-10-CM

## 2017-07-10 DIAGNOSIS — G89.4 CHRONIC PAIN SYNDROME: ICD-10-CM

## 2017-07-10 DIAGNOSIS — E53.8 VITAMIN B 12 DEFICIENCY: Primary | ICD-10-CM

## 2017-07-10 DIAGNOSIS — F31.81 BIPOLAR 2 DISORDER (H): ICD-10-CM

## 2017-07-10 DIAGNOSIS — J45.21 INTERMITTENT ASTHMA, WITH ACUTE EXACERBATION: ICD-10-CM

## 2017-07-10 DIAGNOSIS — R73.03 PREDIABETES: ICD-10-CM

## 2017-07-10 PROCEDURE — 99607 MTMS BY PHARM ADDL 15 MIN: CPT | Mod: U4 | Performed by: PHARMACIST

## 2017-07-10 PROCEDURE — 99606 MTMS BY PHARM EST 15 MIN: CPT | Mod: U4 | Performed by: PHARMACIST

## 2017-07-10 PROCEDURE — 99213 OFFICE O/P EST LOW 20 MIN: CPT | Performed by: FAMILY MEDICINE

## 2017-07-10 RX ORDER — DOXYCYCLINE 100 MG/1
100 TABLET ORAL 2 TIMES DAILY
Qty: 14 TABLET | Refills: 0 | Status: SHIPPED | OUTPATIENT
Start: 2017-07-10 | End: 2017-08-16

## 2017-07-10 ASSESSMENT — PAIN SCALES - GENERAL: PAINLEVEL: NO PAIN (0)

## 2017-07-10 NOTE — PROGRESS NOTES
"    SUBJECTIVE:                                                    Joel Pineda is a 42 year old male who presents to clinic today for the following health issues:      Concern - Cyst on lip  Onset: 1 week    Description:   Small cyst outside of lower lip    Intensity: mild    Progression of Symptoms:  same    Accompanying Signs & Symptoms:  none    Previous history of similar problem:   yes    Precipitating factors:   Worsened by: n/a    Alleviating factors:  Improved by: n/a    Therapies Tried and outcome: none    SUBJECTIVE:  Here today with the above. Started about a week ago with a small bump. It might be a pimple but has been unable to squeeze any pus from this. But now it's larger and more tender. Not having other similar lesions are boils in other parts of his body.    Review of systems otherwise negative.  Past medical, family, and social history reviewed and updated in chart.    OBJECTIVE:  /88 (BP Location: Right arm, Patient Position: Chair, Cuff Size: Adult Regular)  Pulse 80  Temp 98.6  F (37  C) (Oral)  Ht 1.956 m (6' 5\")  Wt (!) 138.9 kg (306 lb 4.8 oz)  SpO2 94%  BMI 36.32 kg/m2  Alert, pleasant, upbeat, and in no apparent discomfort.  S1 and S2 normal, no murmurs, clicks, gallops or rubs. Regular rate and rhythm. Chest is clear; no wheezes or rales. No edema or JVD.   Skin - firm inflamed 1/2 cm nodule left lower lip edge  Past labs reviewed with the patient.     ASSESSMENT / PLAN:  (L70.0) Cystic acne  (primary encounter diagnosis)  Comment: Likely deep acne cyst. Will treat with a course of oral doxycycline  Plan: doxycycline Monohydrate 100 MG TABS        Discussed mechanism of action of the proposed medication, as well as potential effects, both good and bad.  Patient expressed understanding and agreed with treatment.     Follow up as needed   S. Shady Lyles MD    (Chart documentation completed in part with Dragon voice-recognition software.  Even though reviewed some " grammatical, spelling, and word errors may remain.)

## 2017-07-10 NOTE — MR AVS SNAPSHOT
After Visit Summary   7/10/2017    Joel Pineda    MRN: [id number]           Patient Information     Date Of Birth          1975        Visit Information        Provider Department      7/10/2017 4:00 PM Radha Mcdonald, [facility]        Today's Diagnoses     Vitamin B 12 deficiency    -  1    Bipolar 2 disorder (H)        Intermittent asthma, with acute exacerbation        Chronic pain syndrome        Prediabetes          Care Instructions    Bring in completed ACT to clinic  Discontinue metformin  Vitamin B12 level          Follow-ups after your visit        Your next 10 appointments already scheduled     Jul 10, 2017  6:00 PM CDT   SHORT with Ksenia Lyles MD   [facility] ([facility])    [address] [place] [id number] [phone]            Aug 21, 2017  4:00 PM CDT   TELEMEDICINE with Radha Mcdonald [facility] ([facility]    [address] [place] [address]  [address] [place] [id number] [phone]           Note: this is not an onsite visit; there is no need to come to the facility.              Future tests that were ordered for you today     Open Future Orders        Priority Expected Expires Ordered    Vitamin B12 Routine 7/31/2017 7/31/2017 7/10/2017            Who to contact     If you have questions or need follow up information about today's clinic visit or your schedule please contact [facility] directly at [phone].  Normal or non-critical lab and imaging results will be communicated to you by MyChart, letter or phone within 4 business days after the clinic has received the results. If you do not hear from us within 7 days, please contact the clinic through [employer] or phone. If you have a critical or abnormal lab result, we will notify you by phone as soon as possible.  Submit refill requests through [employer]  or call your pharmacy and they will forward the refill request to us. Please allow 3 business days for your refill to be completed.          Additional Information About Your Visit        [employer] Information     [employer] gives you secure access to your electronic health record. If you see a primary care provider, you can also send messages to your care team and make appointments. If you have questions, please call your primary care clinic.  If you do not have a primary care provider, please call [phone] and they will assist you.        Care EveryWhere ID     This is your Care EveryWhere ID. This could be used by other organizations to access your [place] medical records  [id number]         Blood Pressure from Last 3 Encounters:   06/02/17 118/68   04/04/17 130/76   02/02/17 112/84    Weight from Last 3 Encounters:   06/02/17 (!) 302 lb 4.8 oz (137.1 kg)   02/02/17 296 lb 14.4 oz (134.7 kg)   01/02/17 293 lb 12.8 oz (133.3 kg)                 Today's Medication Changes          These changes are accurate as of: 7/10/17  4:49 PM.  If you have any questions, ask your nurse or doctor.               These medicines have changed or have updated prescriptions.        Dose/Directions    cetirizine 10 MG tablet   Commonly known as:  zyrTEC   This may have changed:  when to take this   Used for:  Cough        Dose:  10 mg   Take 1 tablet (10 mg) by mouth At Bedtime   Quantity:  30 tablet   Refills:  11         Stop taking these medicines if you haven't already. Please contact your care team if you have questions.     desipramine HCl 75 MG Tabs   Stopped by:  Radha Mcdonald RPH           FLUoxetine 20 MG capsule   Commonly known as:  PROzac   Stopped by:  Radha Mcdonald RPH                    Primary Care Provider Office Phone # Fax #    Ksenia Lyles [phone]       [address] [address]        Goals        General    I will  continue to attend Psychiatry appointments for medication management, 1/14/2014 (pt-stated)     Notes - Note edited  8/24/2016  4:04 PM by Dalila Cavazos LSW    As of today's date 8/24/2016 goal is met at 51 - 75%.   Goal Status:  Ongoing Sees  Therapist every other week and Psych PA every 3 weeks.  As of today's date 5/25/2016 goal is met at 51 - 75%.   Goal Status:  Showing progress-  Meeting with Psych PA for second time tomorrow  To review meds As of today's date 4/11/2016 goal is met at 51 - 75%.   Goal Status:  Showing progress  As of today's date 1/14/2014 goal is met at 51 - 75%.   Goal Status:  Active        I will schedule therapy with new counselor, 1/14/2014 (pt-stated)     Notes - Note edited  5/25/2016  1:54 PM by Dalila Cavazos LSW    As of today's date 5/25/2016 goal is met at 76 - 100%.   Goal Status:  Ongoing New therapist and Psych PA on a regular basis  As of today's date 5/10/2016 goal is met at 76 - 100%.   Goal Status:  Ongoing met with new therapist at [facility]  As of today's date 4/11/2016 goal is met at 51 - 75%.   Goal Status:  Ongoing meets with therapist regularly  As of today's date 1/14/2014 goal is met at 0 - 25%.   Goal Status:  Active        Psychosocial I need some help with cleaning (pt-stated)     Notes - Note edited  6/23/2016  1:57 PM by Dalila Cavazos LSW    As of today's date 6/23/2016 goal is met at 26 - 50%.   Goal Status:  Showing progress met with [employer]. Has not yet made a decision  As of today's date 5/25/2016 goal is met at 0 - 25%.   Goal Status:  Ongoing agency had to reschedule appt.  As of today's date 5/10/2016 goal is met at 0 - 25%.   Goal Status:  Active referral to [facility]        Equal Access to Services     ANNA CANO AH: Madhuri Darby, fide thompson, ofelia ojeda, isabell ferrer'aan ah. [facility] [phone].    ATENCIÓN: Si habla español, tiene a faustin disposición servicios gratuitos de  asistencia lingüística. Madelyn al [phone].    We comply with applicable federal civil rights laws and [place] laws. We do not discriminate on the basis of race, color, national origin, age, disability sex, sexual orientation or gender identity.            Thank you!     Thank you for choosing [facility]  for your care. Our goal is always to provide you with excellent care. Hearing back from our patients is one way we can continue to improve our services. Please take a few minutes to complete the written survey that you may receive in the mail after your visit with us. Thank you!             Your Updated Medication List - Protect others around you: Learn how to safely use, store and throw away your medicines at www.disposemymeds.org.          This list is accurate as of: 7/10/17  4:49 PM.  Always use your most recent med list.                   Brand Name Dispense Instructions for use Diagnosis    acetaminophen 500 MG Caps     60 capsule    Take 500 mg by mouth every 4 hours as needed        albuterol 108 (90 BASE) MCG/ACT Inhaler    PROAIR HFA/PROVENTIL HFA/VENTOLIN HFA     Inhale 2 puffs into the lungs every 4 hours as needed        atenolol 50 MG tablet    TENORMIN    90 tablet    TAKE 1 TABLET BY MOUTH EVERY DAY    Essential hypertension with goal blood pressure less than 140/90       atorvastatin 40 MG tablet    LIPITOR    90 tablet    Take 1 tablet (40 mg) by mouth daily    Mixed hyperlipidemia       budesonide 0.25 MG/2ML neb solution    PULMICORT     Irrigate sinus with 0.25mg of budesonide bid along with saline solution        calcium citrate + Tabs      250mg bid        CALMOL-4 76-10 % Supp     30 suppository    Place 1 suppository rectally daily as needed    Anal pain       celecoxib 200 MG capsule    celeBREX    180 capsule    TAKE 1 CAPSULE BY MOUTH TWICE DAILY AS NEEDED FORMODERATE PAIN    Chronic midline low back pain without sciatica       cetirizine 10 MG tablet    zyrTEC     30 tablet    Take 1 tablet (10 mg) by mouth At Bedtime    Cough       clonazePAM 1 MG tablet    klonoPIN    60 tablet    0.5 mg 2 times daily as needed    Depression with anxiety       cyanocobalamin 1000 MCG/ML injection    VITAMIN B12    0.9 mL    Inject 1 mL (1,000 mcg) into the muscle every 30 days    B12 deficiency       dicyclomine 20 MG tablet    BENTYL    120 tablet    Take 1 tablet (20 mg) by mouth 4 times daily as needed    Irritable bowel syndrome with diarrhea       EPINEPHrine 0.3 MG/0.3ML injection     0.6 mL    Inject 0.3 mLs (0.3 mg) into the muscle once as needed for anaphylaxis    Food allergy       fenofibrate 145 MG tablet     90 tablet    Take 1 tablet (145 mg) by mouth daily    Mixed hyperlipidemia       levothyroxine 75 MCG tablet    SYNTHROID/LEVOTHROID    90 tablet    TAKE 1 TABLET BY MOUTH EVERY MORNING    Myxedema heart disease (H)       lidocaine 5 % ointment    XYLOCAINE    250 g    Apply topically 3 times daily as needed for moderate pain Apply as directed    Chronic midline low back pain without sciatica       lithium 450 MG CR tablet    ESKALITH    75 tablet    Take 3 tablets (1,350 mg) by mouth At Bedtime        omeprazole 20 MG tablet      Take 20 mg by mouth 2 times daily        ondansetron 8 MG tablet    ZOFRAN    20 tablet    TAKE 1 TABLET (8 MG) BY MOUTH EVERY 8 HOURS AS NEEDED FOR NAUSEA/VOMITING.    Nausea       * order for DME     12 each    Equipment being ordered: 1 ml tuberculin syringes to be used for Vitamin B12 injections.    Vitamin B12 deficiency (non anaemic)       * order for DME     1 Units    SI-loc belt    SI (sacroiliac) joint dysfunction       * order for DME      Respironics REMSTAR 60 Series Auto CPAP 10-12 cm H2O, Wisp nasal mask w/a large cushion and a chinstrap        oxyCODONE 5 MG IR tablet    ROXICODONE    35 tablet    Take 1-2 tablets (5-10 mg) by mouth every 6 hours as needed for pain (maximum 4 tablet(s) per day)    Facet arthropathy        pregabalin 100 MG capsule    LYRICA    60 capsule    Take 1 capsule (100 mg) by mouth 2 times daily    Chronic midline low back pain without sciatica       prochlorperazine 10 MG tablet    COMPAZINE    30 tablet    Take 1 tablet (10 mg) by mouth 3 times daily as needed for nausea    Nausea       ramipril 5 MG capsule    ALTACE    90 capsule    Take 1 capsule (5 mg) by mouth daily    Essential hypertension       sertraline 50 MG tablet    ZOLOFT     Take 50 mg by mouth daily        tiZANidine 4 MG tablet    ZANAFLEX    90 tablet    Take 1 tablet (4 mg) by mouth 3 times daily as needed for muscle spasms    Dorsalgia       triamcinolone 0.1 % cream    KENALOG     Apply topically as needed for irritation        VISTARIL 50 MG capsule   Generic drug:  hydrOXYzine      Take 50 mg by mouth as needed for itching        vitamin D3 27910 UNITS capsule    CHOLECALCIFEROL    24 capsule    Take one capsule every two weeks.    Vitamin D deficiency       ziprasidone 80 MG capsule    GEODON     Take 80 mg by mouth 2 times daily        * Notice:  This list has 3 medication(s) that are the same as other medications prescribed for you. Read the directions carefully, and ask your doctor or other care provider to review them with you.

## 2017-07-10 NOTE — NURSING NOTE
"Chief Complaint   Patient presents with     Derm Problem       Initial /88 (BP Location: Right arm, Patient Position: Chair, Cuff Size: Adult Regular)  Pulse 80  Temp 98.6  F (37  C) (Oral)  Ht 1.956 m (6' 5\")  Wt (!) 138.9 kg (306 lb 4.8 oz)  SpO2 94%  BMI 36.32 kg/m2 Estimated body mass index is 36.32 kg/(m^2) as calculated from the following:    Height as of this encounter: 1.956 m (6' 5\").    Weight as of this encounter: 138.9 kg (306 lb 4.8 oz).  Medication Reconciliation: complete     JA Peter MA      "

## 2017-07-10 NOTE — PROGRESS NOTES
SUBJECTIVE/OBJECTIVE:                                                    Joel Pineda is a 41 year old male called for follow up visit for Medication Therapy Management.  He was referred to me from Ksenia Lyles.     Chief Complaint: Medication Review.    Allergies/ADRs: Reviewed in Epic  Tobacco: No tobacco use   Alcohol: none  PMH: Reviewed in Epic    Medication Adherence: no issues reported    Mental Health: current therapy includes Sertraline 50mg daily, lithium 1350mg daily. geodon 80mg bid and Geodon has been effective for visual and auditory hallucinations. He does have breakthrough hallucinations but feels for the most part they are well controlled.  Mood has been pretty low due to the changes; feels like their has been some regression. Patient sees therapist tomorrow.  Patient continues to follow with [facility]. Patient continues to see therapist every 2 weeks.    Patient discontinued desipramine because of side effects (excessive sweating). Patient was switched from fluoxetine to sertraline because of [employer] testing from 2013. Changes made about 1 month ago.     Asthma:  Current asthma medications: none.  Pt reports the following symptoms: none  Patient is no longer using any medications for his breathing. Patient is not having any issues with breathing. Patient only has his rescue inhaler that he uses prior to activity. Patient has no shortness of breath or waking up at night due to asthma symptoms. Patient self discontinued singulair and has not seen any increase in symptoms. Patient had duct work fixed in his home and ever since then he has been feeling better.     Pain: current therapy oxycodone 5mg as needed (1 tablet every other day), lyrica 100mg bid, tizanadine 4mg tid prn (this causes him to feel wiped out). Patient was taking  Pain is better controlled.      Pre-Diabetes: current therapy includes metformin xr 500mg with dinner. Patient is not testing blood sugars. Last A1c was 5.5%.      Vitamin B12 Deficiency: current therapy includes Vitamin B12 1000mcg/ml IM every 30 days. Patient is tolerating therapy well.     Current labs include:  BP Readings from Last 3 Encounters:   06/02/17 118/68   04/04/17 130/76   02/02/17 112/84     Today's Vitals: There were no vitals taken for this visit.  Lab Results   Component Value Date    A1C 5.5 05/16/2017   .  Lab Results   Component Value Date    CHOL 165 02/24/2017     Lab Results   Component Value Date    TRIG 326 02/24/2017     Lab Results   Component Value Date    HDL 33 02/24/2017     Lab Results   Component Value Date    LDL 67 02/24/2017       Liver Function Studies -   Recent Labs   Lab Test  01/03/17   0825   PROTTOTAL  7.2   ALBUMIN  4.2   BILITOTAL  0.3   ALKPHOS  66   AST  18   ALT  27       Lab Results   Component Value Date    UCRR 46 11/01/2016    MICROL 11 11/01/2016    UMALCR 24.56 (H) 11/01/2016       Last Basic Metabolic Panel:  Lab Results   Component Value Date     05/03/2017      Lab Results   Component Value Date    POTASSIUM 4.1 05/03/2017     Lab Results   Component Value Date    CHLORIDE 108 05/03/2017     Lab Results   Component Value Date    BUN 16 05/03/2017     Lab Results   Component Value Date    CR 1.19 05/03/2017     GFR Estimate   Date Value Ref Range Status   05/03/2017 66 >60 mL/min/1.7m2 Final     Comment:     Non  GFR Calc   01/03/2017 59 (L) >60 mL/min/1.7m2 Final     Comment:     Non  GFR Calc   11/01/2016 73 >60 mL/min/1.7m2 Final     Comment:     Non  GFR Calc     GFR Estimate If Black   Date Value Ref Range Status   05/03/2017 80 >60 mL/min/1.7m2 Final     Comment:      GFR Calc   01/03/2017 71 >60 mL/min/1.7m2 Final     Comment:      GFR Calc   11/01/2016 88 >60 mL/min/1.7m2 Final     Comment:      GFR Calc     TSH   Date Value Ref Range Status   11/01/2016 2.76 0.40 - 4.00 mU/L Final   ]    Most Recent  Immunizations   Administered Date(s) Administered     Influenza (IIV3) 09/09/2013     Influenza Vaccine IM 3yrs+ 4 Valent IIV4 10/11/2016     Pneumococcal (PCV 13) 10/02/2015     Pneumococcal 23 valent 10/11/2016     TD (ADULT, 7+) 10/04/2002     TDAP Vaccine (Adacel) 07/16/2010           ASSESSMENT:                                                       Current medications were reviewed today.     Medication Adherence: no issues identified    Mental Health: needs improvement. Recent changes made. Patient is following up with therapist tomorrow.    Asthma: Stable. Patient received ACT in mail. Will bring to his clinic visit tonight.    Pain: needs improvement. Patient is experiencing side effects with tizanidine. Patient to follow-up with Dr. Lyles regarding change to skelaxin.    Pre-Diabetes: patient would benefit from discontinuing metformin. Not needed at this time.    Vitamin B12: patient may benefit from updated level.    PLAN:                                                      Patient due for Vitamin B12 level  Discontinue metformin    I spent 30 minutes with this patient today. All changes were made via collaborative practice agreement with Ksenia Lyles. A copy of the visit note was provided to the patient's primary care provider.    Will follow up in 6 weeks.    The patient was sent via [employer] a summary of these recommendations as an after visit summary.     Radha Mcdonald, Pharm.D, BCACP  Medication Therapy Management Pharmacist

## 2017-07-10 NOTE — MR AVS SNAPSHOT
After Visit Summary   7/10/2017    Joel Pineda    MRN: [id number]           Patient Information     Date Of Birth          1975        Visit Information        Provider Department      7/10/2017 6:00 PM Ksenia Lyles MD [facility]        Today's Diagnoses     Cystic acne    -  1       Follow-ups after your visit        Follow-up notes from your care team     Return if symptoms worsen or fail to improve.      Your next 10 appointments already scheduled     Aug 21, 2017  4:00 PM CDT   TELEMEDICINE with Radha Mcdonald [facility] ([facility])    [address] [address] [place] [address]  [address] [place] [id number] [phone]           Note: this is not an onsite visit; there is no need to come to the facility.              Future tests that were ordered for you today     Open Future Orders        Priority Expected Expires Ordered    Vitamin B12 Routine 7/31/2017 7/31/2017 7/10/2017            Who to contact     If you have questions or need follow up information about today's clinic visit or your schedule please contact [facility] directly at [phone].  Normal or non-critical lab and imaging results will be communicated to you by MyChart, letter or phone within 4 business days after the clinic has received the results. If you do not hear from us within 7 days, please contact the clinic through MyChart or phone. If you have a critical or abnormal lab result, we will notify you by phone as soon as possible.  Submit refill requests through [employer] or call your pharmacy and they will forward the refill request to us. Please allow 3 business days for your refill to be completed.          Additional Information About Your Visit        MyChart Information     [employer] gives you secure access to your electronic health record. If you see a primary care provider, you can also send messages to your care team and make  "appointments. If you have questions, please call your primary care clinic.  If you do not have a primary care provider, please call [phone] and they will assist you.        Care EveryWhere ID     This is your Care EveryWhere ID. This could be used by other organizations to access your [facility] medical records  [id number]        Your Vitals Were     Pulse Temperature Height Pulse Oximetry BMI (Body Mass Index)       80 98.6  F (37  C) (Oral) 1.956 m (6' 5\") 94% 36.32 kg/m2        Blood Pressure from Last 3 Encounters:   07/10/17 132/88   06/02/17 118/68   04/04/17 130/76    Weight from Last 3 Encounters:   07/10/17 (!) 138.9 kg (306 lb 4.8 oz)   06/02/17 (!) 137.1 kg (302 lb 4.8 oz)   02/02/17 134.7 kg (296 lb 14.4 oz)              Today, you had the following     No orders found for display         Today's Medication Changes          These changes are accurate as of: 7/10/17  6:14 PM.  If you have any questions, ask your nurse or doctor.               Start taking these medicines.        Dose/Directions    doxycycline Monohydrate 100 MG Tabs   Used for:  Cystic acne   Started by:  Ksenia Lyles MD        Dose:  100 mg   Take 100 mg by mouth 2 times daily   Quantity:  14 tablet   Refills:  0         These medicines have changed or have updated prescriptions.        Dose/Directions    cetirizine 10 MG tablet   Commonly known as:  zyrTEC   This may have changed:  when to take this   Used for:  Cough        Dose:  10 mg   Take 1 tablet (10 mg) by mouth At Bedtime   Quantity:  30 tablet   Refills:  11         Stop taking these medicines if you haven't already. Please contact your care team if you have questions.     desipramine HCl 75 MG Tabs   Stopped by:  Radha Mcdonald RPH           FLUoxetine 20 MG capsule   Commonly known as:  PROzac   Stopped by:  Radha Mcdonald RPH                Where to get your medicines      These medications were sent to [id number] - [place], " [place] - [address]  [address], [facility] [address]     Phone:  [phone]     doxycycline Monohydrate 100 MG Tabs                Primary Care Provider Office Phone # Fax #    Ksenia Shady Lyles [phone]       [address] [address]        Goals        General    I will continue to attend Psychiatry appointments for medication management, 1/14/2014 (pt-stated)     Notes - Note edited  8/24/2016  4:04 PM by Dalila Cavazos LSW    As of today's date 8/24/2016 goal is met at 51 - 75%.   Goal Status:  Ongoing Sees  Therapist every other week and Psych PA every 3 weeks.  As of today's date 5/25/2016 goal is met at 51 - 75%.   Goal Status:  Showing progress-  Meeting with Psych PA for second time tomorrow  To review meds As of today's date 4/11/2016 goal is met at 51 - 75%.   Goal Status:  Showing progress  As of today's date 1/14/2014 goal is met at 51 - 75%.   Goal Status:  Active        I will schedule therapy with new counselor, 1/14/2014 (pt-stated)     Notes - Note edited  5/25/2016  1:54 PM by Dalila Cavazos LSW    As of today's date 5/25/2016 goal is met at 76 - 100%.   Goal Status:  Ongoing New therapist and Psych PA on a regular basis  As of today's date 5/10/2016 goal is met at 76 - 100%.   Goal Status:  Ongoing met with new therapist at [facility]  As of today's date 4/11/2016 goal is met at 51 - 75%.   Goal Status:  Ongoing meets with therapist regularly  As of today's date 1/14/2014 goal is met at 0 - 25%.   Goal Status:  Active        Psychosocial I need some help with cleaning (pt-stated)     Notes - Note edited  6/23/2016  1:57 PM by Dalila Cavazos LSW    As of today's date 6/23/2016 goal is met at 26 - 50%.   Goal Status:  Showing progress met with [facility]. Has not yet made a decision  As of today's date 5/25/2016 goal is met at 0 - 25%.   Goal Status:  Ongoing agency had to reschedule appt.  As of  today's date 5/10/2016 goal is met at 0 - 25%.   Goal Status:  Active referral to [facility]        Equal Access to Services     ANNA CANO : Hadii aad ku hadcorycrissy Brielleisaac, luisda trejakubha, ofelia vieira marychuyjenny, isabell ericain hayaamani perrinmaribel esperanzadrakeha echevarria. So [facility] [phone].    ATENCIÓN: Si habla español, tiene a faustin disposición servicios gratuitos de asistencia lingüística. Llame al [phone].    We comply with applicable federal civil rights laws and [place] laws. We do not discriminate on the basis of race, color, national origin, age, disability sex, sexual orientation or gender identity.            Thank you!     Thank you for choosing [facility]  for your care. Our goal is always to provide you with excellent care. Hearing back from our patients is one way we can continue to improve our services. Please take a few minutes to complete the written survey that you may receive in the mail after your visit with us. Thank you!             Your Updated Medication List - Protect others around you: Learn how to safely use, store and throw away your medicines at www.disposemymeds.org.          This list is accurate as of: 7/10/17  6:14 PM.  Always use your most recent med list.                   Brand Name Dispense Instructions for use Diagnosis    acetaminophen 500 MG Caps     60 capsule    Take 500 mg by mouth every 4 hours as needed        albuterol 108 (90 BASE) MCG/ACT Inhaler    PROAIR HFA/PROVENTIL HFA/VENTOLIN HFA     Inhale 2 puffs into the lungs every 4 hours as needed        atenolol 50 MG tablet    TENORMIN    90 tablet    TAKE 1 TABLET BY MOUTH EVERY DAY    Essential hypertension with goal blood pressure less than 140/90       atorvastatin 40 MG tablet    LIPITOR    90 tablet    Take 1 tablet (40 mg) by mouth daily    Mixed hyperlipidemia       budesonide 0.25 MG/2ML neb solution    PULMICORT     Irrigate sinus with 0.25mg of budesonide bid along with saline solution        calcium  citrate + Tabs      250mg bid        CALMOL-4 76-10 % Supp     30 suppository    Place 1 suppository rectally daily as needed    Anal pain       celecoxib 200 MG capsule    celeBREX    180 capsule    TAKE 1 CAPSULE BY MOUTH TWICE DAILY AS NEEDED FORMODERATE PAIN    Chronic midline low back pain without sciatica       cetirizine 10 MG tablet    zyrTEC    30 tablet    Take 1 tablet (10 mg) by mouth At Bedtime    Cough       clonazePAM 1 MG tablet    klonoPIN    60 tablet    0.5 mg 2 times daily as needed    Depression with anxiety       cyanocobalamin 1000 MCG/ML injection    VITAMIN B12    0.9 mL    Inject 1 mL (1,000 mcg) into the muscle every 30 days    B12 deficiency       dicyclomine 20 MG tablet    BENTYL    120 tablet    Take 1 tablet (20 mg) by mouth 4 times daily as needed    Irritable bowel syndrome with diarrhea       doxycycline Monohydrate 100 MG Tabs     14 tablet    Take 100 mg by mouth 2 times daily    Cystic acne       EPINEPHrine 0.3 MG/0.3ML injection     0.6 mL    Inject 0.3 mLs (0.3 mg) into the muscle once as needed for anaphylaxis    Food allergy       fenofibrate 145 MG tablet     90 tablet    Take 1 tablet (145 mg) by mouth daily    Mixed hyperlipidemia       levothyroxine 75 MCG tablet    SYNTHROID/LEVOTHROID    90 tablet    TAKE 1 TABLET BY MOUTH EVERY MORNING    Myxedema heart disease (H)       lidocaine 5 % ointment    XYLOCAINE    250 g    Apply topically 3 times daily as needed for moderate pain Apply as directed    Chronic midline low back pain without sciatica       lithium 450 MG CR tablet    ESKALITH    75 tablet    Take 3 tablets (1,350 mg) by mouth At Bedtime        omeprazole 20 MG tablet      Take 20 mg by mouth 2 times daily        ondansetron 8 MG tablet    ZOFRAN    20 tablet    TAKE 1 TABLET (8 MG) BY MOUTH EVERY 8 HOURS AS NEEDED FOR NAUSEA/VOMITING.    Nausea       * order for DME     12 each    Equipment being ordered: 1 ml tuberculin syringes to be used for Vitamin B12  injections.    Vitamin B12 deficiency (non anaemic)       * order for DME     1 Units    SI-loc belt    SI (sacroiliac) joint dysfunction       * order for DME      [employer]ics REMSTAR 60 Series Auto CPAP 10-12 cm H2O, Wisp nasal mask w/a large cushion and a chinstrap        oxyCODONE 5 MG IR tablet    ROXICODONE    35 tablet    Take 1-2 tablets (5-10 mg) by mouth every 6 hours as needed for pain (maximum 4 tablet(s) per day)    Facet arthropathy       pregabalin 100 MG capsule    LYRICA    60 capsule    Take 1 capsule (100 mg) by mouth 2 times daily    Chronic midline low back pain without sciatica       prochlorperazine 10 MG tablet    COMPAZINE    30 tablet    Take 1 tablet (10 mg) by mouth 3 times daily as needed for nausea    Nausea       ramipril 5 MG capsule    ALTACE    90 capsule    Take 1 capsule (5 mg) by mouth daily    Essential hypertension       sertraline 50 MG tablet    ZOLOFT     Take 50 mg by mouth daily        tiZANidine 4 MG tablet    ZANAFLEX    90 tablet    Take 1 tablet (4 mg) by mouth 3 times daily as needed for muscle spasms    Dorsalgia       triamcinolone 0.1 % cream    KENALOG     Apply topically as needed for irritation        VISTARIL 50 MG capsule   Generic drug:  hydrOXYzine      Take 50 mg by mouth as needed for itching        vitamin D3 59075 UNITS capsule    CHOLECALCIFEROL    24 capsule    Take one capsule every two weeks.    Vitamin D deficiency       ziprasidone 80 MG capsule    GEODON     Take 80 mg by mouth 2 times daily        * Notice:  This list has 3 medication(s) that are the same as other medications prescribed for you. Read the directions carefully, and ask your doctor or other care provider to review them with you.

## 2017-07-11 RX ORDER — FENOFIBRATE 145 MG/1
TABLET, COATED ORAL
Qty: 90 TABLET | Refills: 0 | Status: SHIPPED | OUTPATIENT
Start: 2017-07-11 | End: 2017-10-16

## 2017-07-11 ASSESSMENT — ASTHMA QUESTIONNAIRES: ACT_TOTALSCORE: 23

## 2017-07-25 DIAGNOSIS — E53.8 VITAMIN B 12 DEFICIENCY: ICD-10-CM

## 2017-07-25 DIAGNOSIS — F31.5 BIPOLAR I DISORDER, MOST RECENT EPISODE (OR CURRENT) DEPRESSED, SEVERE, SPECIFIED AS WITH PSYCHOTIC BEHAVIOR (H): Primary | ICD-10-CM

## 2017-07-25 LAB
LITHIUM SERPL-SCNC: 0.9 MMOL/L (ref 0.6–1.2)
VIT B12 SERPL-MCNC: 584 PG/ML (ref 193–986)

## 2017-07-25 PROCEDURE — 36415 COLL VENOUS BLD VENIPUNCTURE: CPT | Performed by: FAMILY MEDICINE

## 2017-07-25 PROCEDURE — 82607 VITAMIN B-12: CPT | Performed by: FAMILY MEDICINE

## 2017-07-25 PROCEDURE — 80178 ASSAY OF LITHIUM: CPT | Performed by: PHYSICIAN ASSISTANT

## 2017-07-27 ENCOUNTER — MYC REFILL (OUTPATIENT)
Dept: FAMILY MEDICINE | Facility: CLINIC | Age: 42
End: 2017-07-27

## 2017-07-27 ENCOUNTER — TELEPHONE (OUTPATIENT)
Dept: FAMILY MEDICINE | Facility: CLINIC | Age: 42
End: 2017-07-27

## 2017-07-27 DIAGNOSIS — E78.2 MIXED HYPERLIPIDEMIA: ICD-10-CM

## 2017-07-27 DIAGNOSIS — E55.9 VITAMIN D DEFICIENCY: ICD-10-CM

## 2017-07-27 RX ORDER — CHOLECALCIFEROL (VITAMIN D3) 1250 MCG
CAPSULE ORAL
Qty: 24 CAPSULE | Refills: 1 | Status: SHIPPED | OUTPATIENT
Start: 2017-07-27 | End: 2019-06-09

## 2017-07-27 NOTE — TELEPHONE ENCOUNTER
Routing refill request to provider for review/approval because:  Drug not on the [facility] refill protocol - Vitamin D3 78273 units  Heather White RN

## 2017-07-27 NOTE — TELEPHONE ENCOUNTER
fenofibrate 145 MG tablet       Last Written Prescription Date: 7/11/17  Last Fill Quantity: 90, # refills: 0    [facility] Office Visit with [facility] or [facility] prescribing provider:  7/10/17   Future Office Visit:      Cholesterol   Date Value Ref Range Status   02/24/2017 165 <200 mg/dL Final     HDL Cholesterol   Date Value Ref Range Status   02/24/2017 33 (L) >39 mg/dL Final     LDL Cholesterol Calculated   Date Value Ref Range Status   02/24/2017 67 <100 mg/dL Final     Comment:     Desirable:       <100 mg/dl     Triglycerides   Date Value Ref Range Status   02/24/2017 326 (H) <150 mg/dL Final     Comment:     Borderline high:  150-199 mg/dl   High:             200-499 mg/dl   Very high:       >499 mg/dl   Fasting specimen       Cholesterol/HDL Ratio   Date Value Ref Range Status   12/03/2014 7.0 (H) 0.0 - 5.0 Final     ALT   Date Value Ref Range Status   01/03/2017 27 0 - 70 U/L Final

## 2017-07-27 NOTE — TELEPHONE ENCOUNTER
"Message from [employer]:  Original authorizing provider: MD SANTIAGO Martinez SONIALopez Pineda would like a refill of the following medications:  vitamin D3 (CHOLECALCIFEROL) 36167 UNITS capsule [Houston Koroma MD]    Preferred pharmacy: [facility] [address]    Comment:  Hello, please have the new \"Vitamin D3 02063 UNITS capsule (CHOLECALCIFEROL)\" Rx renewal transferred from Dr. Koroma to Dr. Lyles. Thank you.    Medication renewals requested in this message routed to other providers:  fenofibrate 145 MG tablet [Ksenia Lyles MD]  "

## 2017-07-27 NOTE — TELEPHONE ENCOUNTER
atorvastatin (LIPITOR) 40 MG tablet     Last Written Prescription Date: 1/2/17  Last Fill Quantity: 90, # refills: 1  Last Office Visit with G, P or [facility] prescribing provider: 7/10/17       Lab Results   Component Value Date    CHOL 165 02/24/2017     Lab Results   Component Value Date    HDL 33 02/24/2017     Lab Results   Component Value Date    LDL 67 02/24/2017     Lab Results   Component Value Date    TRIG 326 02/24/2017     Lab Results   Component Value Date    CHOLHDLRATIO 7.0 12/03/2014

## 2017-07-27 NOTE — TELEPHONE ENCOUNTER
Reason for Call:  Other prescription    Detailed comments: Vit D3 take 1x in 2 wks or 2x wk please clarify    Phone Number [place] can be reached at: [phone]    Best Time: any    Can we leave a detailed message on this number? YES    Call taken on 7/27/2017 at 6:50 PM by Sienna Porter

## 2017-07-27 NOTE — TELEPHONE ENCOUNTER
"Message from [employer]:  Original authorizing provider: Ksenia Lyles MD    SANTIAGO Pineda would like a refill of the following medications:  fenofibrate 145 MG tablet [Ksenia Lyles MD]    Preferred pharmacy: [facility] [address] [address]    Comment:  Hello, please have the new \"Vitamin D3 54285 UNITS capsule (CHOLECALCIFEROL)\" Rx renewal transferred from Dr. Koroma to Dr. Lyles. Thank you.    Medication renewals requested in this message routed to other providers:  vitamin D3 (CHOLECALCIFEROL) 55694 UNITS capsule [Houston Koroma MD]  "

## 2017-07-28 ENCOUNTER — MYC REFILL (OUTPATIENT)
Dept: FAMILY MEDICINE | Facility: CLINIC | Age: 42
End: 2017-07-28

## 2017-07-28 DIAGNOSIS — I10 ESSENTIAL HYPERTENSION WITH GOAL BLOOD PRESSURE LESS THAN 140/90: Chronic | ICD-10-CM

## 2017-07-28 NOTE — TELEPHONE ENCOUNTER
Message from MyChart:  Original authorizing provider: MD SANTIAGO Dickey would like a refill of the following medications:  atenolol (TENORMIN) 50 MG tablet [Ksenia Lyles MD]    Preferred pharmacy: [facility] [id number] [facility] [address] [address]    Comment:

## 2017-07-28 NOTE — TELEPHONE ENCOUNTER
atenolol (TENORMIN) 50 MG tablet      Last Written Prescription Date: 01/19/17  Last Fill Quantity: 90, # refills:  1  Last Office Visit with G, P or [facility] prescribing provider:  07/10/17 Dr. Lyles   Future Office Visit:        BP Readings from Last 3 Encounters:   07/10/17 132/88   06/02/17 118/68   04/04/17 130/76

## 2017-08-01 RX ORDER — FENOFIBRATE 145 MG/1
TABLET, COATED ORAL
Qty: 90 TABLET | Refills: 0 | OUTPATIENT
Start: 2017-08-01

## 2017-08-01 RX ORDER — ATORVASTATIN CALCIUM 40 MG/1
TABLET, FILM COATED ORAL
Qty: 90 TABLET | Refills: 1 | Status: SHIPPED | OUTPATIENT
Start: 2017-08-01 | End: 2018-01-24

## 2017-08-01 RX ORDER — ATENOLOL 50 MG/1
50 TABLET ORAL DAILY
Qty: 90 TABLET | Refills: 1 | Status: SHIPPED | OUTPATIENT
Start: 2017-08-01 | End: 2017-08-16 | Stop reason: ALTCHOICE

## 2017-08-01 NOTE — TELEPHONE ENCOUNTER
BP Readings from Last 3 Encounters:   07/10/17 132/88   06/02/17 118/68   04/04/17 130/76     Prescription approved per [facility] Refill Protocol.  Gloria Jimenez RN

## 2017-08-02 RX ORDER — ATENOLOL 50 MG/1
TABLET ORAL
Qty: 90 TABLET | Refills: 1 | OUTPATIENT
Start: 2017-08-02

## 2017-08-02 RX ORDER — METOPROLOL SUCCINATE 100 MG/1
100 TABLET, EXTENDED RELEASE ORAL DAILY
Qty: 30 TABLET | Refills: 1 | Status: SHIPPED | OUTPATIENT
Start: 2017-08-02 | End: 2017-09-23

## 2017-08-02 NOTE — TELEPHONE ENCOUNTER
Called patient and left message and informed of below. Patient to call back with questions or concerns.  Tracy Lyon RN.

## 2017-08-02 NOTE — TELEPHONE ENCOUNTER
Routing refill request to provider for review/approval because:  Atenolol shortage- do you want a different medication?  Tracy Lyon RN.

## 2017-08-02 NOTE — TELEPHONE ENCOUNTER
Changed refill to Metoprolol  mg daily due to Atenolol shortage. 2 month refill given. Is due for HTN check-make visit within 1-2 months.  JOCELYN Pro, NP-C

## 2017-08-14 ENCOUNTER — MYC REFILL (OUTPATIENT)
Dept: FAMILY MEDICINE | Facility: CLINIC | Age: 42
End: 2017-08-14

## 2017-08-14 DIAGNOSIS — M47.819 FACET ARTHROPATHY: ICD-10-CM

## 2017-08-14 NOTE — TELEPHONE ENCOUNTER
Message from MyChart:  Original authorizing provider: MD SANTIAGO Dickey would like a refill of the following medications:  oxyCODONE (ROXICODONE) 5 MG IR tablet [Ksenia Lyles MD]    Preferred pharmacy: [facility] [id number] [facility] [address] [address]    Comment:  Please have the paper Rx left @ [facility] . Thanks.

## 2017-08-14 NOTE — TELEPHONE ENCOUNTER
Routing refill request to provider for review/approval because:  Drug not on the [facility] refill protocol   Tracy Lyon RN.

## 2017-08-15 RX ORDER — OXYCODONE HYDROCHLORIDE 5 MG/1
5-10 TABLET ORAL EVERY 6 HOURS PRN
Qty: 35 TABLET | Refills: 0 | Status: SHIPPED | OUTPATIENT
Start: 2017-08-15 | End: 2017-11-02

## 2017-08-16 ENCOUNTER — OFFICE VISIT (OUTPATIENT)
Dept: FAMILY MEDICINE | Facility: CLINIC | Age: 42
End: 2017-08-16
Payer: COMMERCIAL

## 2017-08-16 VITALS
WEIGHT: 314.8 LBS | BODY MASS INDEX: 37.17 KG/M2 | RESPIRATION RATE: 14 BRPM | SYSTOLIC BLOOD PRESSURE: 134 MMHG | DIASTOLIC BLOOD PRESSURE: 80 MMHG | TEMPERATURE: 98.2 F | HEART RATE: 84 BPM | OXYGEN SATURATION: 96 % | HEIGHT: 77 IN

## 2017-08-16 DIAGNOSIS — K61.2 ABSCESS OF ANAL AND RECTAL REGIONS: Primary | ICD-10-CM

## 2017-08-16 PROCEDURE — 99213 OFFICE O/P EST LOW 20 MIN: CPT | Performed by: FAMILY MEDICINE

## 2017-08-16 RX ORDER — DEXTROAMPHETAMINE SACCHARATE, AMPHETAMINE ASPARTATE, DEXTROAMPHETAMINE SULFATE AND AMPHETAMINE SULFATE 2.5; 2.5; 2.5; 2.5 MG/1; MG/1; MG/1; MG/1
10 TABLET ORAL DAILY
COMMUNITY
Start: 2017-07-28 | End: 2017-08-21 | Stop reason: DRUGHIGH

## 2017-08-16 RX ORDER — CLINDAMYCIN HCL 150 MG
CAPSULE ORAL
COMMUNITY
Start: 2017-08-13 | End: 2017-10-24

## 2017-08-16 ASSESSMENT — PATIENT HEALTH QUESTIONNAIRE - PHQ9
SUM OF ALL RESPONSES TO PHQ QUESTIONS 1-9: 16
5. POOR APPETITE OR OVEREATING: MORE THAN HALF THE DAYS

## 2017-08-16 ASSESSMENT — ANXIETY QUESTIONNAIRES
IF YOU CHECKED OFF ANY PROBLEMS ON THIS QUESTIONNAIRE, HOW DIFFICULT HAVE THESE PROBLEMS MADE IT FOR YOU TO DO YOUR WORK, TAKE CARE OF THINGS AT HOME, OR GET ALONG WITH OTHER PEOPLE: VERY DIFFICULT
5. BEING SO RESTLESS THAT IT IS HARD TO SIT STILL: NOT AT ALL
7. FEELING AFRAID AS IF SOMETHING AWFUL MIGHT HAPPEN: MORE THAN HALF THE DAYS
3. WORRYING TOO MUCH ABOUT DIFFERENT THINGS: MORE THAN HALF THE DAYS
2. NOT BEING ABLE TO STOP OR CONTROL WORRYING: MORE THAN HALF THE DAYS
6. BECOMING EASILY ANNOYED OR IRRITABLE: SEVERAL DAYS
GAD7 TOTAL SCORE: 11
1. FEELING NERVOUS, ANXIOUS, OR ON EDGE: MORE THAN HALF THE DAYS

## 2017-08-16 ASSESSMENT — PAIN SCALES - GENERAL: PAINLEVEL: MILD PAIN (3)

## 2017-08-16 NOTE — MR AVS SNAPSHOT
After Visit Summary   8/16/2017    Joel Pineda    MRN: [id number]           Patient Information     Date Of Birth          1975        Visit Information        Provider Department      8/16/2017 10:40 AM Ksenia Lyles MD [facility]        Today's Diagnoses     Abscess of anal and rectal regions    -  1       Follow-ups after your visit        Follow-up notes from your care team     Return if symptoms worsen or fail to improve.      Your next 10 appointments already scheduled     Aug 21, 2017  4:00 PM CDT   TELEMEDICINE with Radha Mcdonald [facility]    [address] [place] [address] [address]  [address] [facility]  [facility] [place] [address] [phone]           Note: this is not an onsite visit; there is no need to come to the facility.              Who to contact     If you have questions or need follow up information about today's clinic visit or your schedule please contact [facility] directly at [phone].  Normal or non-critical lab and imaging results will be communicated to you by [employer]imb[employer], letter or phone within 4 business days after the clinic has received the results. If you do not hear from us within 7 days, please contact the clinic through [employer]imb[employer]Chart or phone. If you have a critical or abnormal lab result, we will notify you by phone as soon as possible.  Submit refill requests through [employer] or call your pharmacy and they will forward the refill request to us. Please allow 3 business days for your refill to be completed.          Additional Information About Your Visit        Nimbus LLChart Information     [employer] gives you secure access to your electronic health record. If you see a primary care provider, you can also send messages to your care team and make appointments. If you have questions, please call your primary care clinic.  If you do not have a primary care provider, please call [phone] and  "they will assist you.        Care EveryWhere ID     This is your Care EveryWhere ID. This could be used by other organizations to access your [place] medical records  [id number]        Your Vitals Were     Pulse Temperature Respirations Height Pulse Oximetry BMI (Body Mass Index)    84 98.2  F (36.8  C) (Oral) 14 1.956 m (6' 5\") 96% 37.33 kg/m2       Blood Pressure from Last 3 Encounters:   08/16/17 134/80   07/10/17 132/88   06/02/17 118/68    Weight from Last 3 Encounters:   08/16/17 (!) 142.8 kg (314 lb 12.8 oz)   07/10/17 (!) 138.9 kg (306 lb 4.8 oz)   06/02/17 (!) 137.1 kg (302 lb 4.8 oz)              We Performed the Following     DEPRESSION ACTION PLAN (DAP)          Today's Medication Changes          These changes are accurate as of: 8/16/17 11:22 AM.  If you have any questions, ask your nurse or doctor.               These medicines have changed or have updated prescriptions.        Dose/Directions    cetirizine 10 MG tablet   Commonly known as:  zyrTEC   This may have changed:  when to take this   Used for:  Cough        Dose:  10 mg   Take 1 tablet (10 mg) by mouth At Bedtime   Quantity:  30 tablet   Refills:  11         Stop taking these medicines if you haven't already. Please contact your care team if you have questions.     atenolol 50 MG tablet   Commonly known as:  TENORMIN   Stopped by:  Ksenia Lyles MD                    Primary Care Provider Office Phone # Fax #    Ksenia Lyles [phone] [address] [facility] [address]        Goals        General    I will continue to attend Psychiatry appointments for medication management, 1/14/2014 (pt-stated)     Notes - Note edited  8/24/2016  4:04 PM by Dalila Cavazos LSW    As of today's date 8/24/2016 goal is met at 51 - 75%.   Goal Status:  Ongoing Sees  Therapist every other week and Psych PA every 3 weeks.  As of today's date 5/25/2016 goal is met at 51 - 75%.   Goal Status:  Showing " progress-  Meeting with Psych PA for second time tomorrow  To review meds As of today's date 4/11/2016 goal is met at 51 - 75%.   Goal Status:  Showing progress  As of today's date 1/14/2014 goal is met at 51 - 75%.   Goal Status:  Active        I will schedule therapy with new counselor, 1/14/2014 (pt-stated)     Notes - Note edited  5/25/2016  1:54 PM by Dalila Cavazos LSW    As of today's date 5/25/2016 goal is met at 76 - 100%.   Goal Status:  Ongoing New therapist and Psych PA on a regular basis  As of today's date 5/10/2016 goal is met at 76 - 100%.   Goal Status:  Ongoing met with new therapist at [facility]  As of today's date 4/11/2016 goal is met at 51 - 75%.   Goal Status:  Ongoing meets with therapist regularly  As of today's date 1/14/2014 goal is met at 0 - 25%.   Goal Status:  Active        Psychosocial I need some help with cleaning (pt-stated)     Notes - Note edited  6/23/2016  1:57 PM by Dalila Cavazos LSW    As of today's date 6/23/2016 goal is met at 26 - 50%.   Goal Status:  Showing progress met with [facility]. Has not yet made a decision  As of today's date 5/25/2016 goal is met at 0 - 25%.   Goal Status:  Ongoing agency had to reschedule appt.  As of today's date 5/10/2016 goal is met at 0 - 25%.   Goal Status:  Active referral to [facility]        Equal Access to Services     [facility] : Hadii aad ku hadasho Soomaali, waaxda luqadaha, qaybta kaalmada adeegyada, isabell shankar . So [facility] [phone].    ATENCIÓN: Si habla español, tiene a faustin disposición servicios gratuitos de asistencia lingüística. Llame al [phone].    We comply with applicable federal civil rights laws and [place] laws. We do not discriminate on the basis of race, color, national origin, age, disability sex, sexual orientation or gender identity.            Thank you!     Thank you for choosing [facility]  for your care. Our goal is always to provide you with excellent  care. Hearing back from our patients is one way we can continue to improve our services. Please take a few minutes to complete the written survey that you may receive in the mail after your visit with us. Thank you!             Your Updated Medication List - Protect others around you: Learn how to safely use, store and throw away your medicines at www.disposemymeds.org.          This list is accurate as of: 8/16/17 11:22 AM.  Always use your most recent med list.                   Brand Name Dispense Instructions for use Diagnosis    acetaminophen 500 MG Caps     60 capsule    Take 500 mg by mouth every 4 hours as needed        albuterol 108 (90 BASE) MCG/ACT Inhaler    PROAIR HFA/PROVENTIL HFA/VENTOLIN HFA     Inhale 2 puffs into the lungs every 4 hours as needed        amphetamine-dextroamphetamine 10 MG per tablet    ADDERALL     Take 10 mg by mouth daily        atorvastatin 40 MG tablet    LIPITOR    90 tablet    TAKE 1 TABLET (40 MG) BY MOUTH DAILY    Mixed hyperlipidemia       budesonide 0.25 MG/2ML neb solution    PULMICORT     Irrigate sinus with 0.25mg of budesonide bid along with saline solution        calcium citrate + Tabs      250mg bid        CALMOL-4 76-10 % Supp     30 suppository    Place 1 suppository rectally daily as needed    Anal pain       celecoxib 200 MG capsule    celeBREX    180 capsule    TAKE 1 CAPSULE BY MOUTH TWICE DAILY AS NEEDED FORMODERATE PAIN    Chronic midline low back pain without sciatica       cetirizine 10 MG tablet    zyrTEC    30 tablet    Take 1 tablet (10 mg) by mouth At Bedtime    Cough       clindamycin 150 MG capsule    CLEOCIN          clonazePAM 1 MG tablet    klonoPIN    60 tablet    0.5 mg 2 times daily as needed    Depression with anxiety       cyanocobalamin 1000 MCG/ML injection    VITAMIN B12    0.9 mL    Inject 1 mL (1,000 mcg) into the muscle every 30 days    B12 deficiency       dicyclomine 20 MG tablet    BENTYL    120 tablet    Take 1 tablet (20 mg) by  mouth 4 times daily as needed    Irritable bowel syndrome with diarrhea       EPINEPHrine 0.3 MG/0.3ML injection 2-pack    EPIPEN/ADRENACLICK/or ANY BX GENERIC EQUIV    0.6 mL    Inject 0.3 mLs (0.3 mg) into the muscle once as needed for anaphylaxis    Food allergy       fenofibrate 145 MG tablet     90 tablet    TAKE 1 TABLET (145 MG) BYMOUTH DAILY    Mixed hyperlipidemia       levothyroxine 75 MCG tablet    SYNTHROID/LEVOTHROID    90 tablet    TAKE 1 TABLET BY MOUTH EVERY MORNING    Myxedema heart disease (H)       lidocaine 5 % ointment    XYLOCAINE    250 g    Apply topically 3 times daily as needed for moderate pain Apply as directed    Chronic midline low back pain without sciatica       lithium 450 MG CR tablet    ESKALITH    75 tablet    Take 3 tablets (1,350 mg) by mouth At Bedtime        metoprolol 100 MG 24 hr tablet    TOPROL-XL    30 tablet    Take 1 tablet (100 mg) by mouth daily    Essential hypertension with goal blood pressure less than 140/90       omeprazole 20 MG tablet      Take 20 mg by mouth 2 times daily        ondansetron 8 MG tablet    ZOFRAN    20 tablet    TAKE 1 TABLET (8 MG) BY MOUTH EVERY 8 HOURS AS NEEDED FOR NAUSEA/VOMITING.    Nausea       * order for DME     12 each    Equipment being ordered: 1 ml tuberculin syringes to be used for Vitamin B12 injections.    Vitamin B12 deficiency (non anaemic)       * order for DME     1 Units    SI-loc belt    SI (sacroiliac) joint dysfunction       * order for DME      Respironics REMSTAR 60 Series Auto CPAP 10-12 cm H2O, Wisp nasal mask w/a large cushion and a chinstrap        oxyCODONE 5 MG IR tablet    ROXICODONE    35 tablet    Take 1-2 tablets (5-10 mg) by mouth every 6 hours as needed for pain (maximum 4 tablet(s) per day)    Facet arthropathy       pregabalin 100 MG capsule    LYRICA    60 capsule    Take 1 capsule (100 mg) by mouth 2 times daily    Chronic midline low back pain without sciatica       prochlorperazine 10 MG tablet     COMPAZINE    30 tablet    Take 1 tablet (10 mg) by mouth 3 times daily as needed for nausea    Nausea       ramipril 5 MG capsule    ALTACE    90 capsule    Take 1 capsule (5 mg) by mouth daily    Essential hypertension       sertraline 50 MG tablet    ZOLOFT     Take 50 mg by mouth daily        tiZANidine 4 MG tablet    ZANAFLEX    90 tablet    Take 1 tablet (4 mg) by mouth 3 times daily as needed for muscle spasms    Dorsalgia       triamcinolone 0.1 % cream    KENALOG     Apply topically as needed for irritation        VISTARIL 50 MG capsule   Generic drug:  hydrOXYzine      Take 50 mg by mouth as needed for itching        vitamin D3 22678 UNITS capsule    CHOLECALCIFEROL    24 capsule    Take one capsule every two weeks.    Vitamin D deficiency       ziprasidone 80 MG capsule    GEODON     Take 80 mg by mouth 2 times daily        * Notice:  This list has 3 medication(s) that are the same as other medications prescribed for you. Read the directions carefully, and ask your doctor or other care provider to review them with you.

## 2017-08-16 NOTE — PROGRESS NOTES
"  SUBJECTIVE:                                                    Joel Pineda is a 42 year old male who presents to clinic today for the following health issues:      ED/UC Followup:    Facility:  [facility] 8/13/17  Date of visit: 8/13/17  Reason for visit: abscess  Current Status: has improved - still having some pain, drainage     SUBJECTIVE:  Here today in follow-up of abscess near his left buttock. Full records reviewed through care everywhere. Came on suddenly a few days ago and was seen through the emergency department where it was drained. Placed on clindamycin. Doing a lot better. Clindamycin makes him a little bit nauseated but tolerable. Soaking in a time once or twice daily and this seems to help. Changing dressing a couple of times a day    Review of systems otherwise negative.  Past medical, family, and social history reviewed and updated in chart.    OBJECTIVE:  /80 (BP Location: Right arm, Patient Position: Right side, Cuff Size: Adult Regular)  Pulse 84  Temp 98.2  F (36.8  C) (Oral)  Resp 14  Ht 1.956 m (6' 5\")  Wt (!) 142.8 kg (314 lb 12.8 oz)  SpO2 96%  BMI 37.33 kg/m2  Alert, pleasant, upbeat, and in no apparent discomfort.  S1 and S2 normal, no murmurs, clicks, gallops or rubs. Regular rate and rhythm. Chest is clear; no wheezes or rales. No edema or JVD.  Skin - small area of warm erythema with a drainage point lower left buttock near the perineum. No obvious fluid collection  Past labs reviewed with the patient.     ASSESSMENT / PLAN:  (K61.2) Abscess of anal and rectal regions  (primary encounter diagnosis)  Comment: Current course and it should heal up nicely  Plan:     Follow up as needed   JA Lyles MD    (Chart documentation completed in part with Dragon voice-recognition software.  Even though reviewed some grammatical, spelling, and word errors may remain.)      "

## 2017-08-17 ASSESSMENT — ANXIETY QUESTIONNAIRES: GAD7 TOTAL SCORE: 11

## 2017-08-21 ENCOUNTER — ALLIED HEALTH/NURSE VISIT (OUTPATIENT)
Dept: PHARMACY | Facility: CLINIC | Age: 42
End: 2017-08-21
Payer: COMMERCIAL

## 2017-08-21 ENCOUNTER — MYC MEDICAL ADVICE (OUTPATIENT)
Dept: FAMILY MEDICINE | Facility: CLINIC | Age: 42
End: 2017-08-21

## 2017-08-21 DIAGNOSIS — F31.81 BIPOLAR 2 DISORDER (H): Primary | ICD-10-CM

## 2017-08-21 DIAGNOSIS — M54.50 CHRONIC MIDLINE LOW BACK PAIN WITHOUT SCIATICA: ICD-10-CM

## 2017-08-21 DIAGNOSIS — I10 ESSENTIAL HYPERTENSION WITH GOAL BLOOD PRESSURE LESS THAN 140/90: ICD-10-CM

## 2017-08-21 DIAGNOSIS — G89.29 CHRONIC MIDLINE LOW BACK PAIN WITHOUT SCIATICA: ICD-10-CM

## 2017-08-21 PROCEDURE — 99606 MTMS BY PHARM EST 15 MIN: CPT | Mod: U4 | Performed by: PHARMACIST

## 2017-08-21 RX ORDER — PREGABALIN 100 MG/1
100 CAPSULE ORAL 2 TIMES DAILY
Qty: 60 CAPSULE | Refills: 5 | Status: SHIPPED | OUTPATIENT
Start: 2017-08-21 | End: 2017-09-06

## 2017-08-21 RX ORDER — DEXTROAMPHETAMINE SACCHARATE, AMPHETAMINE ASPARTATE, DEXTROAMPHETAMINE SULFATE AND AMPHETAMINE SULFATE 5; 5; 5; 5 MG/1; MG/1; MG/1; MG/1
20 TABLET ORAL DAILY
COMMUNITY
Start: 2017-08-16 | End: 2017-10-24

## 2017-08-21 NOTE — PROGRESS NOTES
SUBJECTIVE/OBJECTIVE:                                                    Joel Pineda is a 41 year old male called for follow up visit for Medication Therapy Management.  He was referred to me from Ksenia Lyles.     Chief Complaint: Medication Review.    Allergies/ADRs: Reviewed in Epic  Tobacco: No tobacco use   Alcohol: none  PMH: Reviewed in Epic    Medication Adherence: no issues reported    Mental Health: current therapy includes adderall 20mg daily (this has helped decrease suicidal ideations but not feeling any additonal energy) Sertraline 50mg daily, lithium 1350mg daily, geodon 80mg bid and Geodon has been effective for visual and auditory hallucinations. He does have breakthrough hallucinations but feels for the most part they are well controlled.  Mood has not improved. Patient has discontinued seeing a therapist because he has nothing more to say and the therapist doesn't have anything more with him either. Patient has been trying to reconnect with people.  Patient continues to follow with [facility]. Patient does not notice any issues with sleep (has been sleeping better for awhile now), appetite has been average. Patient has been gaining weight. Patient is entertaining the thought of going back to [employer].     Hypertension: Current medications include metoprolol XL 100mg daily.  Patient does not self-monitor BP.  Patient reports the following medication side effects: fatigue but uncertain. Patient was switched from atenolol to metoprolol due to the atenolol shortage. Patient has not noticed any changes in his mood since the change.    ASSESSMENT:                                                       Current medications were reviewed today.     Medication Adherence: no issues identified    Mental Health: needs improvement. Recent changes made.    Hypertension: Stable. Pt would benefit from the following changes - continued BP monitoring and monitoring any changes in mood.    PLAN:                                                       No medication changes made today.    I spent 15 minutes with this patient today. All changes were made via collaborative practice agreement with Ksenia Lyles. A copy of the visit note was provided to the patient's primary care provider.    Will follow up in 8 weeks.    The patient was sent via [employer] a summary of these recommendations as an after visit summary.     Radha Mcdonald, Pharm.D, BCACP  Medication Therapy Management Pharmacist

## 2017-08-21 NOTE — MR AVS SNAPSHOT
After Visit Summary   8/21/2017    Joel Pineda    MRN: [id number]           Patient Information     Date Of Birth          1975        Visit Information        Provider Department      8/21/2017 4:00 PM Radha Mcdonald, [facility]        Today's Diagnoses     Bipolar 2 disorder (H)    -  1    Essential hypertension with goal blood pressure less than 140/90          Care Instructions    No medication changes made today.          Follow-ups after your visit        Your next 10 appointments already scheduled     Oct 23, 2017  4:00 PM CDT   TELEMEDICINE with Radha Mcdonald [facility] ([facility] [place]    [id number] [place] [address] [place]  [address] [place] [id number] [phone]           Note: this is not an onsite visit; there is no need to come to the facility.              Who to contact     If you have questions or need follow up information about today's clinic visit or your schedule please contact [facility] directly at [phone].  Normal or non-critical lab and imaging results will be communicated to you by [employer], letter or phone within 4 business days after the clinic has received the results. If you do not hear from us within 7 days, please contact the clinic through [employer] or phone. If you have a critical or abnormal lab result, we will notify you by phone as soon as possible.  Submit refill requests through [employer] or call your pharmacy and they will forward the refill request to us. Please allow 3 business days for your refill to be completed.          Additional Information About Your Visit        [employer] Information     [employer] gives you secure access to your electronic health record. If you see a primary care provider, you can also send messages to your care team and make appointments. If you have questions, please call your primary care clinic.  If you do not have a primary care  provider, please call [phone] and they will assist you.        Care EveryWhere ID     This is your Care EveryWhere ID. This could be used by other organizations to access your [place] medical records  [id number]         Blood Pressure from Last 3 Encounters:   08/16/17 134/80   07/10/17 132/88   06/02/17 118/68    Weight from Last 3 Encounters:   08/16/17 (!) 314 lb 12.8 oz (142.8 kg)   07/10/17 (!) 306 lb 4.8 oz (138.9 kg)   06/02/17 (!) 302 lb 4.8 oz (137.1 kg)              Today, you had the following     No orders found for display         Today's Medication Changes          These changes are accurate as of: 8/21/17  4:26 PM.  If you have any questions, ask your nurse or doctor.               These medicines have changed or have updated prescriptions.        Dose/Directions    amphetamine-dextroamphetamine 20 MG per tablet   Commonly known as:  ADDERALL   This may have changed:  Another medication with the same name was removed. Continue taking this medication, and follow the directions you see here.   Changed by:  Radha Mcdonald, [facility]        Dose:  20 mg   Take 20 mg by mouth daily   Refills:  0       cetirizine 10 MG tablet   Commonly known as:  zyrTEC   This may have changed:  when to take this   Used for:  Cough        Dose:  10 mg   Take 1 tablet (10 mg) by mouth At Bedtime   Quantity:  30 tablet   Refills:  11            Where to get your medicines      Some of these will need a paper prescription and others can be bought over the counter.  Ask your nurse if you have questions.     Bring a paper prescription for each of these medications     pregabalin 100 MG capsule                Primary Care Provider Office Phone # Fax #    Ksenia Lyles [phone] [address] [facility] [address]        Goals        General    I will continue to attend Psychiatry appointments for medication management, 1/14/2014 (pt-stated)     Notes - Note edited  8/24/2016   4:04 PM by Dalila Cavazos LSW    As of today's date 8/24/2016 goal is met at 51 - 75%.   Goal Status:  Ongoing Sees  Therapist every other week and Psych PA every 3 weeks.  As of today's date 5/25/2016 goal is met at 51 - 75%.   Goal Status:  Showing progress-  Meeting with Psych PA for second time tomorrow  To review meds As of today's date 4/11/2016 goal is met at 51 - 75%.   Goal Status:  Showing progress  As of today's date 1/14/2014 goal is met at 51 - 75%.   Goal Status:  Active        I will schedule therapy with new counselor, 1/14/2014 (pt-stated)     Notes - Note edited  5/25/2016  1:54 PM by Dalila Cavazos LSW    As of today's date 5/25/2016 goal is met at 76 - 100%.   Goal Status:  Ongoing New therapist and Psych PA on a regular basis  As of today's date 5/10/2016 goal is met at 76 - 100%.   Goal Status:  Ongoing met with new therapist at [facility]  As of today's date 4/11/2016 goal is met at 51 - 75%.   Goal Status:  Ongoing meets with therapist regularly  As of today's date 1/14/2014 goal is met at 0 - 25%.   Goal Status:  Active        Psychosocial I need some help with cleaning (pt-stated)     Notes - Note edited  6/23/2016  1:57 PM by Dalila Cavazos LSW    As of today's date 6/23/2016 goal is met at 26 - 50%.   Goal Status:  Showing progress met with [facility]. Has not yet made a decision  As of today's date 5/25/2016 goal is met at 0 - 25%.   Goal Status:  Ongoing agency had to reschedule appt.  As of today's date 5/10/2016 goal is met at 0 - 25%.   Goal Status:  Active referral to [facility]        Equal Access to Services     [facility]: Madhuri rosa Soisaac, waaxda luqadaha, qaybta kaalmada adejenny, isabell echevarria. [facility] [phone].    ATENCIÓN: Si habla español, tiene a faustin disposición servicios gratuitos de asistencia lingüística. Llame al [phone].    We comply with applicable federal civil rights laws and [place] laws. We do not  discriminate on the basis of race, color, national origin, age, disability sex, sexual orientation or gender identity.            Thank you!     Thank you for choosing [facility]  for your care. Our goal is always to provide you with excellent care. Hearing back from our patients is one way we can continue to improve our services. Please take a few minutes to complete the written survey that you may receive in the mail after your visit with us. Thank you!             Your Updated Medication List - Protect others around you: Learn how to safely use, store and throw away your medicines at www.disposemymeds.org.          This list is accurate as of: 8/21/17  4:26 PM.  Always use your most recent med list.                   Brand Name Dispense Instructions for use Diagnosis    acetaminophen 500 MG Caps     60 capsule    Take 500 mg by mouth every 4 hours as needed        albuterol 108 (90 BASE) MCG/ACT Inhaler    PROAIR HFA/PROVENTIL HFA/VENTOLIN HFA     Inhale 2 puffs into the lungs every 4 hours as needed        amphetamine-dextroamphetamine 20 MG per tablet    ADDERALL     Take 20 mg by mouth daily        atorvastatin 40 MG tablet    LIPITOR    90 tablet    TAKE 1 TABLET (40 MG) BY MOUTH DAILY    Mixed hyperlipidemia       budesonide 0.25 MG/2ML neb solution    PULMICORT     Irrigate sinus with 0.25mg of budesonide bid along with saline solution        calcium citrate + Tabs      250mg bid        CALMOL-4 76-10 % Supp     30 suppository    Place 1 suppository rectally daily as needed    Anal pain       celecoxib 200 MG capsule    celeBREX    180 capsule    TAKE 1 CAPSULE BY MOUTH TWICE DAILY AS NEEDED FORMODERATE PAIN    Chronic midline low back pain without sciatica       cetirizine 10 MG tablet    zyrTEC    30 tablet    Take 1 tablet (10 mg) by mouth At Bedtime    Cough       clindamycin 150 MG capsule    CLEOCIN          clonazePAM 1 MG tablet    klonoPIN    60 tablet    0.5 mg 2 times daily  as needed    Depression with anxiety       cyanocobalamin 1000 MCG/ML injection    VITAMIN B12    0.9 mL    Inject 1 mL (1,000 mcg) into the muscle every 30 days    B12 deficiency       dicyclomine 20 MG tablet    BENTYL    120 tablet    Take 1 tablet (20 mg) by mouth 4 times daily as needed    Irritable bowel syndrome with diarrhea       EPINEPHrine 0.3 MG/0.3ML injection 2-pack    EPIPEN/ADRENACLICK/or ANY BX GENERIC EQUIV    0.6 mL    Inject 0.3 mLs (0.3 mg) into the muscle once as needed for anaphylaxis    Food allergy       fenofibrate 145 MG tablet     90 tablet    TAKE 1 TABLET (145 MG) BYMOUTH DAILY    Mixed hyperlipidemia       levothyroxine 75 MCG tablet    SYNTHROID/LEVOTHROID    90 tablet    TAKE 1 TABLET BY MOUTH EVERY MORNING    Myxedema heart disease (H)       lidocaine 5 % ointment    XYLOCAINE    250 g    Apply topically 3 times daily as needed for moderate pain Apply as directed    Chronic midline low back pain without sciatica       lithium 450 MG CR tablet    ESKALITH    75 tablet    Take 3 tablets (1,350 mg) by mouth At Bedtime        metoprolol 100 MG 24 hr tablet    TOPROL-XL    30 tablet    Take 1 tablet (100 mg) by mouth daily    Essential hypertension with goal blood pressure less than 140/90       omeprazole 20 MG tablet      Take 20 mg by mouth 2 times daily        ondansetron 8 MG tablet    ZOFRAN    20 tablet    TAKE 1 TABLET (8 MG) BY MOUTH EVERY 8 HOURS AS NEEDED FOR NAUSEA/VOMITING.    Nausea       * order for DME     12 each    Equipment being ordered: 1 ml tuberculin syringes to be used for Vitamin B12 injections.    Vitamin B12 deficiency (non anaemic)       * order for DME     1 Units    SI-loc belt    SI (sacroiliac) joint dysfunction       * order for DME      Respironics REMSTAR 60 Series Auto CPAP 10-12 cm H2O, Wisp nasal mask w/a large cushion and a chinstrap        oxyCODONE 5 MG IR tablet    ROXICODONE    35 tablet    Take 1-2 tablets (5-10 mg) by mouth every 6 hours as  needed for pain (maximum 4 tablet(s) per day)    Facet arthropathy       pregabalin 100 MG capsule    LYRICA    60 capsule    Take 1 capsule (100 mg) by mouth 2 times daily    Chronic midline low back pain without sciatica       prochlorperazine 10 MG tablet    COMPAZINE    30 tablet    Take 1 tablet (10 mg) by mouth 3 times daily as needed for nausea    Nausea       ramipril 5 MG capsule    ALTACE    90 capsule    Take 1 capsule (5 mg) by mouth daily    Essential hypertension       sertraline 50 MG tablet    ZOLOFT     Take 50 mg by mouth daily        tiZANidine 4 MG tablet    ZANAFLEX    90 tablet    Take 1 tablet (4 mg) by mouth 3 times daily as needed for muscle spasms    Dorsalgia       triamcinolone 0.1 % cream    KENALOG     Apply topically as needed for irritation        VISTARIL 50 MG capsule   Generic drug:  hydrOXYzine      Take 50 mg by mouth as needed for itching        vitamin D3 24418 UNITS capsule    CHOLECALCIFEROL    24 capsule    Take one capsule every two weeks.    Vitamin D deficiency       ziprasidone 80 MG capsule    GEODON     Take 80 mg by mouth 2 times daily        * Notice:  This list has 3 medication(s) that are the same as other medications prescribed for you. Read the directions carefully, and ask your doctor or other care provider to review them with you.

## 2017-08-21 NOTE — TELEPHONE ENCOUNTER
Routing refill request to provider for review/approval because:  Drug not on the [facility] refill protocol   A break in medication  Medication teed up below.    Sapna Barragan RN, [facility]

## 2017-09-06 ENCOUNTER — OFFICE VISIT (OUTPATIENT)
Dept: FAMILY MEDICINE | Facility: CLINIC | Age: 42
End: 2017-09-06
Payer: COMMERCIAL

## 2017-09-06 VITALS
WEIGHT: 310.7 LBS | BODY MASS INDEX: 36.69 KG/M2 | SYSTOLIC BLOOD PRESSURE: 122 MMHG | DIASTOLIC BLOOD PRESSURE: 78 MMHG | OXYGEN SATURATION: 96 % | HEART RATE: 108 BPM | TEMPERATURE: 98.1 F | HEIGHT: 77 IN | RESPIRATION RATE: 16 BRPM

## 2017-09-06 DIAGNOSIS — G89.29 CHRONIC MIDLINE LOW BACK PAIN WITHOUT SCIATICA: ICD-10-CM

## 2017-09-06 DIAGNOSIS — H65.03 BILATERAL ACUTE SEROUS OTITIS MEDIA, RECURRENCE NOT SPECIFIED: Primary | ICD-10-CM

## 2017-09-06 DIAGNOSIS — M54.50 CHRONIC MIDLINE LOW BACK PAIN WITHOUT SCIATICA: ICD-10-CM

## 2017-09-06 DIAGNOSIS — M25.812 SHOULDER IMPINGEMENT, LEFT: ICD-10-CM

## 2017-09-06 PROCEDURE — 99214 OFFICE O/P EST MOD 30 MIN: CPT | Performed by: FAMILY MEDICINE

## 2017-09-06 RX ORDER — PREGABALIN 75 MG/1
75 CAPSULE ORAL 2 TIMES DAILY
Qty: 60 CAPSULE | Refills: 5 | Status: SHIPPED | OUTPATIENT
Start: 2017-09-06 | End: 2018-04-07

## 2017-09-06 RX ORDER — PREDNISONE 20 MG/1
TABLET ORAL
Qty: 15 TABLET | Refills: 0 | Status: SHIPPED | OUTPATIENT
Start: 2017-09-06 | End: 2017-10-24

## 2017-09-06 RX ORDER — NYSTATIN AND TRIAMCINOLONE ACETONIDE 100000; 1 [USP'U]/G; MG/G
CREAM TOPICAL PRN
COMMUNITY
End: 2017-10-24

## 2017-09-06 RX ORDER — AZITHROMYCIN 250 MG/1
TABLET, FILM COATED ORAL
Qty: 6 TABLET | Refills: 0 | Status: SHIPPED | OUTPATIENT
Start: 2017-09-06 | End: 2017-09-06

## 2017-09-06 RX ORDER — AZITHROMYCIN 250 MG/1
TABLET, FILM COATED ORAL
Qty: 6 TABLET | Refills: 0 | Status: SHIPPED | OUTPATIENT
Start: 2017-09-06 | End: 2017-10-24

## 2017-09-06 ASSESSMENT — PAIN SCALES - GENERAL: PAINLEVEL: MILD PAIN (3)

## 2017-09-06 NOTE — MR AVS SNAPSHOT
After Visit Summary   9/6/2017    Joel Pineda    MRN: [id number]           Patient Information     Date Of Birth          1975        Visit Information        Provider Department      9/6/2017 10:20 AM Ksenia Lyles MD [facility]        Today's Diagnoses     Bilateral acute serous otitis media, recurrence not specified    -  1    Shoulder impingement, left        Chronic midline low back pain without sciatica           Follow-ups after your visit        Follow-up notes from your care team     Return if symptoms worsen or fail to improve.      Your next 10 appointments already scheduled     Oct 23, 2017  4:00 PM CDT   TELEMEDICINE with Rad[facility] ([facility]    [address] [address] [place] [address] [address]  [address] [facility]  [facility] [address] [phone]           Note: this is not an onsite visit; there is no need to come to the facility.              Who to contact     If you have questions or need follow up information about today's clinic visit or your schedule please contact [facility] directly at [phone].  Normal or non-critical lab and imaging results will be communicated to you by MyChart, letter or phone within 4 business days after the clinic has received the results. If you do not hear from us within 7 days, please contact the clinic through [employer] or phone. If you have a critical or abnormal lab result, we will notify you by phone as soon as possible.  Submit refill requests through [employer] or call your pharmacy and they will forward the refill request to us. Please allow 3 business days for your refill to be completed.          Additional Information About Your Visit        [employer] Information     [employer] gives you secure access to your electronic health record. If you see a primary care provider, you can also send messages to your care team and make appointments. If you have questions,  "please call your primary care clinic.  If you do not have a primary care provider, please call [phone] and they will assist you.        Care EveryWhere ID     This is your Care EveryWhere ID. This could be used by other organizations to access your [facility] medical records  [id number]        Your Vitals Were     Pulse Temperature Respirations Height Pulse Oximetry BMI (Body Mass Index)    108 98.1  F (36.7  C) (Oral) 16 6' 5\" (1.956 m) 96% 36.84 kg/m2       Blood Pressure from Last 3 Encounters:   09/06/17 122/78   08/16/17 134/80   07/10/17 132/88    Weight from Last 3 Encounters:   09/06/17 (!) 310 lb 11.2 oz (140.9 kg)   08/16/17 (!) 314 lb 12.8 oz (142.8 kg)   07/10/17 (!) 306 lb 4.8 oz (138.9 kg)              Today, you had the following     No orders found for display         Today's Medication Changes          These changes are accurate as of: 9/6/17 10:55 AM.  If you have any questions, ask your nurse or doctor.               Start taking these medicines.        Dose/Directions    azithromycin 250 MG tablet   Commonly known as:  ZITHROMAX   Used for:  Bilateral acute serous otitis media, recurrence not specified   Started by:  Ksenia Lyles MD        Two tabs today.  One tab daily x 4 days.   Quantity:  6 tablet   Refills:  0       predniSONE 20 MG tablet   Commonly known as:  DELTASONE   Used for:  Bilateral acute serous otitis media, recurrence not specified, Shoulder impingement, left   Started by:  Ksenia Lyles MD        3 tabs daily for 5 days   Quantity:  15 tablet   Refills:  0         These medicines have changed or have updated prescriptions.        Dose/Directions    cetirizine 10 MG tablet   Commonly known as:  zyrTEC   This may have changed:  when to take this   Used for:  Cough        Dose:  10 mg   Take 1 tablet (10 mg) by mouth At Bedtime   Quantity:  30 tablet   Refills:  11       pregabalin 75 MG capsule   Commonly known as:  LYRICA   This may have changed:    - " medication strength  - how much to take   Used for:  Chronic midline low back pain without sciatica   Changed by:  Ksenia Lyles MD        Dose:  75 mg   Take 1 capsule (75 mg) by mouth 2 times daily   Quantity:  60 capsule   Refills:  5            Where to get your medicines      These medications were sent to [email or web address] [id number] [facility] [address] [facility]  [address] [facility] [address]     Phone:  [phone]     azithromycin 250 MG tablet    predniSONE 20 MG tablet         Some of these will need a paper prescription and others can be bought over the counter.  Ask your nurse if you have questions.     Bring a paper prescription for each of these medications     pregabalin 75 MG capsule                Primary Care Provider Office Phone # Fax #    Ksenia Lyles [phone] [address] [facility] [address]        Goals        General    I will continue to attend Psychiatry appointments for medication management, 1/14/2014 (pt-stated)     Notes - Note edited  8/24/2016  4:04 PM by Dalila Cavazos LSW    As of today's date 8/24/2016 goal is met at 51 - 75%.   Goal Status:  Ongoing Sees  Therapist every other week and Psych PA every 3 weeks.  As of today's date 5/25/2016 goal is met at 51 - 75%.   Goal Status:  Showing progress-  Meeting with Psych PA for second time tomorrow  To review meds As of today's date 4/11/2016 goal is met at 51 - 75%.   Goal Status:  Showing progress  As of today's date 1/14/2014 goal is met at 51 - 75%.   Goal Status:  Active        I will schedule therapy with new counselor, 1/14/2014 (pt-stated)     Notes - Note edited  5/25/2016  1:54 PM by Dalila Cavazos LSW    As of today's date 5/25/2016 goal is met at 76 - 100%.   Goal Status:  Ongoing New therapist and Psych PA on a regular basis  As of today's date 5/10/2016 goal is met at 76 - 100%.   Goal Status:  Ongoing met with new therapist at  [facility]  As of today's date 4/11/2016 goal is met at 51 - 75%.   Goal Status:  Ongoing meets with therapist regularly  As of today's date 1/14/2014 goal is met at 0 - 25%.   Goal Status:  Active        Psychosocial I need some help with cleaning (pt-stated)     Notes - Note edited  6/23/2016  1:57 PM by Dalila Cavazos LSW    As of today's date 6/23/2016 goal is met at 26 - 50%.   Goal Status:  Showing progress met with [facility]. Has not yet made a decision  As of today's date 5/25/2016 goal is met at 0 - 25%.   Goal Status:  Ongoing agency had to reschedule appt.  As of today's date 5/10/2016 goal is met at 0 - 25%.   Goal Status:  Active referral to [facility]        Equal Access to Services     ANNA CANO : Hadii floyd Darby, waaxda luqadaha, qaybta kaalmada ademaribelyafabrice, isabell echevarria. So [facility] [phone].    ATENCIÓN: Si habla español, tiene a faustin disposición servicios gratuitos de asistencia lingüística. Llame al [phone].    We comply with applicable federal civil rights laws and [place] laws. We do not discriminate on the basis of race, color, national origin, age, disability sex, sexual orientation or gender identity.            Thank you!     Thank you for choosing [facility]  for your care. Our goal is always to provide you with excellent care. Hearing back from our patients is one way we can continue to improve our services. Please take a few minutes to complete the written survey that you may receive in the mail after your visit with us. Thank you!             Your Updated Medication List - Protect others around you: Learn how to safely use, store and throw away your medicines at www.disposemymeds.org.          This list is accurate as of: 9/6/17 10:55 AM.  Always use your most recent med list.                   Brand Name Dispense Instructions for use Diagnosis    acetaminophen 500 MG Caps     60 capsule    Take 500 mg by mouth every 4 hours as  needed        albuterol 108 (90 BASE) MCG/ACT Inhaler    PROAIR HFA/PROVENTIL HFA/VENTOLIN HFA     Inhale 2 puffs into the lungs every 4 hours as needed        amphetamine-dextroamphetamine 20 MG per tablet    ADDERALL     Take 20 mg by mouth daily        atorvastatin 40 MG tablet    LIPITOR    90 tablet    TAKE 1 TABLET (40 MG) BY MOUTH DAILY    Mixed hyperlipidemia       azithromycin 250 MG tablet    ZITHROMAX    6 tablet    Two tabs today.  One tab daily x 4 days.    Bilateral acute serous otitis media, recurrence not specified       budesonide 0.25 MG/2ML neb solution    PULMICORT     Irrigate sinus with 0.25mg of budesonide bid along with saline solution        calcium citrate + Tabs      250mg bid        CALMOL-4 76-10 % Supp     30 suppository    Place 1 suppository rectally daily as needed    Anal pain       celecoxib 200 MG capsule    celeBREX    180 capsule    TAKE 1 CAPSULE BY MOUTH TWICE DAILY AS NEEDED FORMODERATE PAIN    Chronic midline low back pain without sciatica       cetirizine 10 MG tablet    zyrTEC    30 tablet    Take 1 tablet (10 mg) by mouth At Bedtime    Cough       clindamycin 150 MG capsule    CLEOCIN          clonazePAM 1 MG tablet    klonoPIN    60 tablet    0.5 mg 2 times daily as needed    Depression with anxiety       cyanocobalamin 1000 MCG/ML injection    VITAMIN B12    0.9 mL    Inject 1 mL (1,000 mcg) into the muscle every 30 days    B12 deficiency       dicyclomine 20 MG tablet    BENTYL    120 tablet    Take 1 tablet (20 mg) by mouth 4 times daily as needed    Irritable bowel syndrome with diarrhea       EPINEPHrine 0.3 MG/0.3ML injection 2-pack    EPIPEN/ADRENACLICK/or ANY BX GENERIC EQUIV    0.6 mL    Inject 0.3 mLs (0.3 mg) into the muscle once as needed for anaphylaxis    Food allergy       fenofibrate 145 MG tablet     90 tablet    TAKE 1 TABLET (145 MG) BYMOUTH DAILY    Mixed hyperlipidemia       levothyroxine 75 MCG tablet    SYNTHROID/LEVOTHROID    90 tablet    TAKE 1  TABLET BY MOUTH EVERY MORNING    Myxedema heart disease (H)       lidocaine 5 % ointment    XYLOCAINE    250 g    Apply topically 3 times daily as needed for moderate pain Apply as directed    Chronic midline low back pain without sciatica       lithium 450 MG CR tablet    ESKALITH    75 tablet    Take 3 tablets (1,350 mg) by mouth At Bedtime        metoprolol 100 MG 24 hr tablet    TOPROL-XL    30 tablet    Take 1 tablet (100 mg) by mouth daily    Essential hypertension with goal blood pressure less than 140/90       nystatin-triamcinolone cream    MYCOLOG II     Apply topically as needed        omeprazole 20 MG tablet      Take 20 mg by mouth 2 times daily        ondansetron 8 MG tablet    ZOFRAN    20 tablet    TAKE 1 TABLET (8 MG) BY MOUTH EVERY 8 HOURS AS NEEDED FOR NAUSEA/VOMITING.    Nausea       * order for DME     12 each    Equipment being ordered: 1 ml tuberculin syringes to be used for Vitamin B12 injections.    Vitamin B12 deficiency (non anaemic)       * order for DME     1 Units    SI-loc belt    SI (sacroiliac) joint dysfunction       * order for DME      [employer]ics REMSTAR 60 Series Auto CPAP 10-12 cm H2O, Wisp nasal mask w/a large cushion and a chinstrap        oxyCODONE 5 MG IR tablet    ROXICODONE    35 tablet    Take 1-2 tablets (5-10 mg) by mouth every 6 hours as needed for pain (maximum 4 tablet(s) per day)    Facet arthropathy       predniSONE 20 MG tablet    DELTASONE    15 tablet    3 tabs daily for 5 days    Bilateral acute serous otitis media, recurrence not specified, Shoulder impingement, left       pregabalin 75 MG capsule    LYRICA    60 capsule    Take 1 capsule (75 mg) by mouth 2 times daily    Chronic midline low back pain without sciatica       prochlorperazine 10 MG tablet    COMPAZINE    30 tablet    Take 1 tablet (10 mg) by mouth 3 times daily as needed for nausea    Nausea       ramipril 5 MG capsule    ALTACE    90 capsule    Take 1 capsule (5 mg) by mouth daily    Essential  hypertension       sertraline 50 MG tablet    ZOLOFT     Take 50 mg by mouth daily        tiZANidine 4 MG tablet    ZANAFLEX    90 tablet    Take 1 tablet (4 mg) by mouth 3 times daily as needed for muscle spasms    Dorsalgia       triamcinolone 0.1 % cream    KENALOG     Apply topically as needed for irritation        VISTARIL 50 MG capsule   Generic drug:  hydrOXYzine      Take 50 mg by mouth as needed for itching        vitamin D3 84872 UNITS capsule    CHOLECALCIFEROL    24 capsule    Take one capsule every two weeks.    Vitamin D deficiency       ziprasidone 80 MG capsule    GEODON     Take 80 mg by mouth 2 times daily        * Notice:  This list has 3 medication(s) that are the same as other medications prescribed for you. Read the directions carefully, and ask your doctor or other care provider to review them with you.

## 2017-09-06 NOTE — NURSING NOTE
"Chief Complaint   Patient presents with     Shoulder Pain     Ear Problem       Initial /78 (BP Location: Right arm, Patient Position: Right side, Cuff Size: Adult Regular)  Pulse 108  Temp 98.1  F (36.7  C) (Oral)  Resp 16  Ht 1.956 m (6' 5\")  Wt (!) 140.9 kg (310 lb 11.2 oz)  SpO2 96%  BMI 36.84 kg/m2 Estimated body mass index is 36.84 kg/(m^2) as calculated from the following:    Height as of this encounter: 1.956 m (6' 5\").    Weight as of this encounter: 140.9 kg (310 lb 11.2 oz).  Medication Reconciliation: complete     Will Benoit WINKLER      "

## 2017-09-06 NOTE — PROGRESS NOTES
"  SUBJECTIVE:   Joel Pineda is a 42 year old male who presents to clinic today for the following health issues:    1. Bilateral ear pain   - follow up 6/2/17   - follow up - was given antibiotic at the time (did help at the time)    Joint Pain    Onset: 6-8 weeks    Description:   Location: left shoulder  Character: sharp, sore, achy    Intensity: 3/10    Progression of Symptoms: better    Accompanying Signs & Symptoms:  Other symptoms: ROM lowered    History:   Previous similar pain: no       Precipitating factors:   Trauma or overuse: YES- shower injury    Alleviating factors:  Improved by: nothing    Therapies Tried and outcome: Rest, avoiding certain movements, Celebrex (was told to stop because of creatinine level)     SUBJECTIVE:  1) bilateral ear pain, left greater than right. More noticeable since a recent flight to [place]. Feels like a dull toothache inside his ear. They feel a little full but hearing seems to be normal. No significant nasal congestion or other upper respiratory infection    2) left shoulder. Started a couple months ago and he thinks it's from pulling himself up out of the tub using a grab bar. It's definitely getting better but at its peak he had difficulty in raising his left arm above his head or reaching back to put on a jacket or shirt. Pain seems to be more anterior. No neck symptoms or neurologic symptoms.    Review of systems otherwise negative.  Past medical, family, and social history reviewed and updated in chart.    OBJECTIVE:  /78 (BP Location: Right arm, Patient Position: Right side, Cuff Size: Adult Regular)  Pulse 108  Temp 98.1  F (36.7  C) (Oral)  Resp 16  Ht 6' 5\" (1.956 m)  Wt (!) 310 lb 11.2 oz (140.9 kg)  SpO2 96%  BMI 36.84 kg/m2  Alert, pleasant, upbeat, and in no apparent discomfort.  Ears - serous fluid bilaterally. Slightly erythematous on the left  Nose clear  Neck supple no lymphadenopathy  S1 and S2 normal, no murmurs, clicks, gallops or " rubs. Regular rate and rhythm. Chest is clear; no wheezes or rales. No edema or JVD.   LUE - slightly hesitant range of motion, but full. Weakly positive impingement and palpably tender at the biceps insertion. Rotator cuff strong and stable  Past labs reviewed with the patient.     ASSESSMENT / PLAN:  (H65.03) Bilateral acute serous otitis media, recurrence not specified  (primary encounter diagnosis)  Comment: Discussed mechanism of action of the proposed medication, as well as potential effects, both good and bad.  Patient expressed understanding and agreed with treatment.   Plan: azithromycin (ZITHROMAX) 250 MG tablet            (M75.42) Shoulder impingement, left  Comment: Consultation on basic range of motion exercises and conservative care  Plan:     Follow up as needed   JA Lyles MD    (Chart documentation completed in part with Dragon voice-recognition software.  Even though reviewed some grammatical, spelling, and word errors may remain.)

## 2017-09-23 ENCOUNTER — MYC REFILL (OUTPATIENT)
Dept: FAMILY MEDICINE | Facility: CLINIC | Age: 42
End: 2017-09-23

## 2017-09-23 DIAGNOSIS — E03.9 MYXEDEMA HEART DISEASE: ICD-10-CM

## 2017-09-23 DIAGNOSIS — I51.9 MYXEDEMA HEART DISEASE: ICD-10-CM

## 2017-09-23 DIAGNOSIS — I10 ESSENTIAL HYPERTENSION WITH GOAL BLOOD PRESSURE LESS THAN 140/90: Chronic | ICD-10-CM

## 2017-09-25 NOTE — TELEPHONE ENCOUNTER
Message from [place]:  Original authorizing provider: Jing Priest NP    SANTIAGO [place] would like a refill of the following medications:  metoprolol (TOPROL-XL) 100 MG 24 hr tablet [Jing Priest NP]    Preferred pharmacy: [facility] [id number] [facility] [address] [address]    Comment:  Please transfer the metoprolol request to Dr. Lyles. No refills remain for either Rx. Thank you.    Medication renewals requested in this message routed to other providers:  levothyroxine (SYNTHROID/LEVOTHROID) 75 MCG tablet [Ksenia Lyles MD]

## 2017-09-25 NOTE — TELEPHONE ENCOUNTER
metoprolol (TOPROL-XL) 100 MG 24 hr tablet      Last Written Prescription Date: 8/2/17  Last Fill Quantity: 30, # refills: 1    Last Office Visit with FMHELGA, DONAVONP or [facility] prescribing provider:  9/6/17   Future Office Visit:        BP Readings from Last 3 Encounters:   09/06/17 122/78   08/16/17 134/80   07/10/17 132/88

## 2017-09-26 RX ORDER — METOPROLOL SUCCINATE 100 MG/1
100 TABLET, EXTENDED RELEASE ORAL DAILY
Qty: 30 TABLET | Refills: 11 | Status: SHIPPED | OUTPATIENT
Start: 2017-09-26 | End: 2017-12-11 | Stop reason: DRUGHIGH

## 2017-09-26 RX ORDER — LEVOTHYROXINE SODIUM 75 UG/1
75 TABLET ORAL EVERY MORNING
Qty: 90 TABLET | Refills: 0 | Status: SHIPPED | OUTPATIENT
Start: 2017-09-26 | End: 2018-01-04

## 2017-10-16 ENCOUNTER — MYC REFILL (OUTPATIENT)
Dept: FAMILY MEDICINE | Facility: CLINIC | Age: 42
End: 2017-10-16

## 2017-10-16 DIAGNOSIS — E53.8 B12 DEFICIENCY: ICD-10-CM

## 2017-10-16 DIAGNOSIS — E78.2 MIXED HYPERLIPIDEMIA: ICD-10-CM

## 2017-10-16 NOTE — TELEPHONE ENCOUNTER
cyanocobalamin (VITAMIN B12) 1000 MCG/ML injection        Last Written Prescription Date: 9/13/16  Last Fill Quantity: 9ml,    # refills: 11  Last Office Visit with [facility], [facility] or  Health prescribing provider:  9/6/17      Lab Results   Component Value Date    WBC 10.4 09/02/2016     Lab Results   Component Value Date    RBC 5.08 09/02/2016     Lab Results   Component Value Date    HGB 15.8 09/02/2016     Lab Results   Component Value Date    HCT 46.9 09/02/2016     No components found for: MCT  Lab Results   Component Value Date    MCV 92 09/02/2016     Lab Results   Component Value Date    MCH 31.1 09/02/2016     Lab Results   Component Value Date    MCHC 33.7 09/02/2016     Lab Results   Component Value Date    RDW 14.2 09/02/2016     Lab Results   Component Value Date     09/02/2016     Lab Results   Component Value Date    AST 18 01/03/2017     Lab Results   Component Value Date    ALT 27 01/03/2017     Creatinine   Date Value Ref Range Status   05/03/2017 1.19 0.66 - 1.25 mg/dL Final       fenofibrate 145 MG tablet       Last Written Prescription Date: 7/11/17  Last Fill Quantity: 90, # refills: 0    Last Office Visit with [facility], [facility] or [facility] prescribing provider:  9/6/17   Future Office Visit:      Cholesterol   Date Value Ref Range Status   02/24/2017 165 <200 mg/dL Final     HDL Cholesterol   Date Value Ref Range Status   02/24/2017 33 (L) >39 mg/dL Final     LDL Cholesterol Calculated   Date Value Ref Range Status   02/24/2017 67 <100 mg/dL Final     Comment:     Desirable:       <100 mg/dl     Triglycerides   Date Value Ref Range Status   02/24/2017 326 (H) <150 mg/dL Final     Comment:     Borderline high:  150-199 mg/dl   High:             200-499 mg/dl   Very high:       >499 mg/dl   Fasting specimen       Cholesterol/HDL Ratio   Date Value Ref Range Status   12/03/2014 7.0 (H) 0.0 - 5.0 Final     ALT   Date Value Ref Range Status   01/03/2017 27 0 - 70 U/L Final

## 2017-10-16 NOTE — TELEPHONE ENCOUNTER
Message from MyChart:  Original authorizing provider: MD SANTIAGO Dickey SONIALopez Pined[place] would like a refill of the following medications:  cyanocobalamin (VITAMIN B12) 1000 MCG/ML injection [Ksenia Lyles MD]  fenofibrate 145 MG tablet [Ksenia Lyles MD]    Preferred pharmacy: [facility] [id number] [facility] [address] [address]    Comment:

## 2017-10-17 ENCOUNTER — TRANSFERRED RECORDS (OUTPATIENT)
Dept: HEALTH INFORMATION MANAGEMENT | Facility: CLINIC | Age: 42
End: 2017-10-17

## 2017-10-18 ENCOUNTER — TRANSFERRED RECORDS (OUTPATIENT)
Dept: HEALTH INFORMATION MANAGEMENT | Facility: CLINIC | Age: 42
End: 2017-10-18

## 2017-10-19 RX ORDER — CYANOCOBALAMIN 1000 UG/ML
1 INJECTION, SOLUTION INTRAMUSCULAR; SUBCUTANEOUS
Qty: 0.9 ML | Refills: 0 | Status: SHIPPED | OUTPATIENT
Start: 2017-10-19 | End: 2017-11-02

## 2017-10-19 RX ORDER — FENOFIBRATE 145 MG/1
145 TABLET, COATED ORAL DAILY
Qty: 90 TABLET | Refills: 1 | Status: SHIPPED | OUTPATIENT
Start: 2017-10-19 | End: 2018-01-24

## 2017-10-19 NOTE — TELEPHONE ENCOUNTER
Fenofibrate - Prescription approved per [facility] Refill Protocol.    B12 - Medication is being filled for 1 time refill only due to:  Patient needs labs due for annual CBC. Future order placed. Noted in pharmacy comments that labwork needed for further refills.

## 2017-10-23 ENCOUNTER — OFFICE VISIT (OUTPATIENT)
Dept: AUDIOLOGY | Facility: CLINIC | Age: 42
End: 2017-10-23
Payer: COMMERCIAL

## 2017-10-23 DIAGNOSIS — H92.01 OTALGIA, RIGHT: Primary | ICD-10-CM

## 2017-10-23 PROCEDURE — 92555 SPEECH THRESHOLD AUDIOMETRY: CPT | Performed by: AUDIOLOGIST

## 2017-10-23 PROCEDURE — 92567 TYMPANOMETRY: CPT | Performed by: AUDIOLOGIST

## 2017-10-23 PROCEDURE — 92552 PURE TONE AUDIOMETRY AIR: CPT | Performed by: AUDIOLOGIST

## 2017-10-23 NOTE — PROGRESS NOTES
AUDIOLOGY REPORT    SUMMARY: Audiology visit completed. See audiogram for results.    RECOMMENDATIONS: Follow-up with ENT.    Rosemary Whittington  Doctor of Audiology  [place] License # [id number]

## 2017-10-23 NOTE — MR AVS SNAPSHOT
After Visit Summary   10/23/2017    Joel Pineda    MRN: [id number]           Patient Information     Date Of Birth          1975        Visit Information        Provider Department      10/23/2017 3:00 PM Driss Cavazos [facility]        Today's Diagnoses     Otalgia, right    -  1       Follow-ups after your visit        Your next 10 appointments already scheduled     Oct 24, 2017  1:30 PM CDT   TELEMEDICINE with Radha Mcdonald [facility] [place] [place]    [id number] [place] [address] [id number]  [facility] [address] [phone]           Note: this is not an onsite visit; there is no need to come to the facility.            Oct 30, 2017  1:00 PM CDT   [facility]    [id number] [place] [address] [address]  [facility] [address] [phone]              Who to contact     If you have questions or need follow up information about today's clinic visit or your schedule please contact [facility] directly at [phone].  Normal or non-critical lab and imaging results will be communicated to you by MyChart, letter or phone within 4 business days after the clinic has received the results. If you do not hear from us within 7 days, please contact the clinic through MyChart or phone. If you have a critical or abnormal lab result, we will notify you by phone as soon as possible.  Submit refill requests through [employer] or call your pharmacy and they will forward the refill request to us. Please allow 3 business days for your refill to be completed.          Additional Information About Your Visit        [employer] Information     [employer] gives you secure access to your electronic health record. If you see a primary care provider, you can also send messages to your care team and make appointments. If you have questions, please call  your primary care clinic.  If you do not have a primary care provider, please call [phone] and they will assist you.      [employer] is an electronic gateway that provides easy, online access to your medical records. With [employer], you can request a clinic appointment, read your test results, renew a prescription or communicate with your care team.     To access your existing account, please contact your [facility] or call [phone] for assistance.        Care EveryWhere ID     This is your Care EveryWhere ID. This could be used by other organizations to access your [place] medical records  [id number]         Blood Pressure from Last 3 Encounters:   09/06/17 122/78   08/16/17 134/80   07/10/17 132/88    Weight from Last 3 Encounters:   09/06/17 (!) 310 lb 11.2 oz (140.9 kg)   08/16/17 (!) 314 lb 12.8 oz (142.8 kg)   07/10/17 (!) 306 lb 4.8 oz (138.9 kg)              We Performed the Following     AUDIOGRAM/TYMPANOGRAM - INTERFACE     AUDIOM THRESHOLD     PURE TONE AUDIOMETRY, AIR     TYMPANOMETRY          Today's Medication Changes          These changes are accurate as of: 10/23/17  3:23 PM.  If you have any questions, ask your nurse or doctor.               These medicines have changed or have updated prescriptions.        Dose/Directions    cetirizine 10 MG tablet   Commonly known as:  zyrTEC   This may have changed:  when to take this   Used for:  Cough        Dose:  10 mg   Take 1 tablet (10 mg) by mouth At Bedtime   Quantity:  30 tablet   Refills:  11                Primary Care Provider Office Phone # Fax #    Ksenia Shady Lyles [phone] [address] [facility] [address]        Goals        General    I will continue to attend Psychiatry appointments for medication management, 1/14/2014 (pt-stated)     Notes - Note edited  8/24/2016  4:04 PM by Dalila Cavazos LSW    As of today's date 8/24/2016 goal is met at 51 - 75%.   Goal  Status:  Ongoing Sees  Therapist every other week and Psych PA every 3 weeks.  As of today's date 5/25/2016 goal is met at 51 - 75%.   Goal Status:  Showing progress-  Meeting with Psych PA for second time tomorrow  To review meds As of today's date 4/11/2016 goal is met at 51 - 75%.   Goal Status:  Showing progress  As of today's date 1/14/2014 goal is met at 51 - 75%.   Goal Status:  Active        I will schedule therapy with new counselor, 1/14/2014 (pt-stated)     Notes - Note edited  5/25/2016  1:54 PM by Dalila Cavazos LSW    As of today's date 5/25/2016 goal is met at 76 - 100%.   Goal Status:  Ongoing New therapist and Psych PA on a regular basis  As of today's date 5/10/2016 goal is met at 76 - 100%.   Goal Status:  Ongoing met with new therapist at [facility]  As of today's date 4/11/2016 goal is met at 51 - 75%.   Goal Status:  Ongoing meets with therapist regularly  As of today's date 1/14/2014 goal is met at 0 - 25%.   Goal Status:  Active        Psychosocial I need some help with cleaning (pt-stated)     Notes - Note edited  6/23/2016  1:57 PM by Dalila Cavazos LSW    As of today's date 6/23/2016 goal is met at 26 - 50%.   Goal Status:  Showing progress met with [facility]. Has not yet made a decision  As of today's date 5/25/2016 goal is met at 0 - 25%.   Goal Status:  Ongoing agency had to reschedule appt.  As of today's date 5/10/2016 goal is met at 0 - 25%.   Goal Status:  Active referral to [facility]        Equal Access to Services     [facility]: Hadii floyd Darby, fide thompson, isabell maloney . So [facility] [phone].    ATENCIÓN: Si habla español, tiene a faustin disposición servicios gratuitos de asistencia lingüística. Llame al [phone].    We comply with applicable federal civil rights laws and [place] laws. We do not discriminate on the basis of race, color, national origin, age, disability, sex, sexual orientation, or  gender identity.            Thank you!     Thank you for choosing [facility]  for your care. Our goal is always to provide you with excellent care. Hearing back from our patients is one way we can continue to improve our services. Please take a few minutes to complete the written survey that you may receive in the mail after your visit with us. Thank you!             Your Updated Medication List - Protect others around you: Learn how to safely use, store and throw away your medicines at www.disposemymeds.org.          This list is accurate as of: 10/23/17  3:23 PM.  Always use your most recent med list.                   Brand Name Dispense Instructions for use Diagnosis    acetaminophen 500 MG Caps     60 capsule    Take 500 mg by mouth every 4 hours as needed        albuterol 108 (90 BASE) MCG/ACT Inhaler    PROAIR HFA/PROVENTIL HFA/VENTOLIN HFA     Inhale 2 puffs into the lungs every 4 hours as needed        amphetamine-dextroamphetamine 20 MG per tablet    ADDERALL     Take 20 mg by mouth daily        atorvastatin 40 MG tablet    LIPITOR    90 tablet    TAKE 1 TABLET (40 MG) BY MOUTH DAILY    Mixed hyperlipidemia       azithromycin 250 MG tablet    ZITHROMAX    6 tablet    Two tabs today.  One tab daily x 4 days.    Bilateral acute serous otitis media, recurrence not specified       budesonide 0.25 MG/2ML neb solution    PULMICORT     Irrigate sinus with 0.25mg of budesonide bid along with saline solution        calcium citrate + Tabs      250mg bid        CALMOL-4 76-10 % Supp     30 suppository    Place 1 suppository rectally daily as needed    Anal pain       celecoxib 200 MG capsule    celeBREX    180 capsule    TAKE 1 CAPSULE BY MOUTH TWICE DAILY AS NEEDED FORMODERATE PAIN    Chronic midline low back pain without sciatica       cetirizine 10 MG tablet    zyrTEC    30 tablet    Take 1 tablet (10 mg) by mouth At Bedtime    Cough       clindamycin 150 MG capsule    CLEOCIN          clonazePAM  1 MG tablet    klonoPIN    60 tablet    0.5 mg 2 times daily as needed    Depression with anxiety       cyanocobalamin 1000 MCG/ML injection    VITAMIN B12    0.9 mL    Inject 1 mL (1,000 mcg) into the muscle every 30 days    B12 deficiency       dicyclomine 20 MG tablet    BENTYL    120 tablet    Take 1 tablet (20 mg) by mouth 4 times daily as needed    Irritable bowel syndrome with diarrhea       EPINEPHrine 0.3 MG/0.3ML injection 2-pack    EPIPEN/ADRENACLICK/or ANY BX GENERIC EQUIV    0.6 mL    Inject 0.3 mLs (0.3 mg) into the muscle once as needed for anaphylaxis    Food allergy       fenofibrate 145 MG tablet     90 tablet    Take 1 tablet (145 mg) by mouth daily    Mixed hyperlipidemia       levothyroxine 75 MCG tablet    SYNTHROID/LEVOTHROID    90 tablet    Take 1 tablet (75 mcg) by mouth every morning    Myxedema heart disease (H)       lidocaine 5 % ointment    XYLOCAINE    250 g    Apply topically 3 times daily as needed for moderate pain Apply as directed    Chronic midline low back pain without sciatica       lithium 450 MG CR tablet    ESKALITH    75 tablet    Take 3 tablets (1,350 mg) by mouth At Bedtime        metoprolol 100 MG 24 hr tablet    TOPROL-XL    30 tablet    Take 1 tablet (100 mg) by mouth daily    Essential hypertension with goal blood pressure less than 140/90       nystatin-triamcinolone cream    MYCOLOG II     Apply topically as needed        omeprazole 20 MG tablet      Take 20 mg by mouth 2 times daily        ondansetron 8 MG tablet    ZOFRAN    20 tablet    TAKE 1 TABLET (8 MG) BY MOUTH EVERY 8 HOURS AS NEEDED FOR NAUSEA/VOMITING.    Nausea       * order for DME     12 each    Equipment being ordered: 1 ml tuberculin syringes to be used for Vitamin B12 injections.    Vitamin B12 deficiency (non anaemic)       * order for DME     1 Units    SI-loc belt    SI (sacroiliac) joint dysfunction       * order for DME      [employer]ics REMSTAR 60 Series Auto CPAP 10-12 cm H2O, Wisp nasal mask  w/a large cushion and a chinstrap        oxyCODONE 5 MG IR tablet    ROXICODONE    35 tablet    Take 1-2 tablets (5-10 mg) by mouth every 6 hours as needed for pain (maximum 4 tablet(s) per day)    Facet arthropathy       predniSONE 20 MG tablet    DELTASONE    15 tablet    3 tabs daily for 5 days    Bilateral acute serous otitis media, recurrence not specified, Shoulder impingement, left       pregabalin 75 MG capsule    LYRICA    60 capsule    Take 1 capsule (75 mg) by mouth 2 times daily    Chronic midline low back pain without sciatica       prochlorperazine 10 MG tablet    COMPAZINE    30 tablet    Take 1 tablet (10 mg) by mouth 3 times daily as needed for nausea    Nausea       ramipril 5 MG capsule    ALTACE    90 capsule    Take 1 capsule (5 mg) by mouth daily    Essential hypertension       sertraline 50 MG tablet    ZOLOFT     Take 50 mg by mouth daily        tiZANidine 4 MG tablet    ZANAFLEX    90 tablet    Take 1 tablet (4 mg) by mouth 3 times daily as needed for muscle spasms    Dorsalgia       triamcinolone 0.1 % cream    KENALOG     Apply topically as needed for irritation        VISTARIL 50 MG capsule   Generic drug:  hydrOXYzine      Take 50 mg by mouth as needed for itching        vitamin D3 63874 UNITS capsule    CHOLECALCIFEROL    24 capsule    Take one capsule every two weeks.    Vitamin D deficiency       ziprasidone 80 MG capsule    GEODON     Take 80 mg by mouth 2 times daily        * Notice:  This list has 3 medication(s) that are the same as other medications prescribed for you. Read the directions carefully, and ask your doctor or other care provider to review them with you.

## 2017-10-24 ENCOUNTER — ALLIED HEALTH/NURSE VISIT (OUTPATIENT)
Dept: PHARMACY | Facility: CLINIC | Age: 42
End: 2017-10-24
Payer: COMMERCIAL

## 2017-10-24 DIAGNOSIS — G89.4 CHRONIC PAIN SYNDROME: ICD-10-CM

## 2017-10-24 DIAGNOSIS — I10 ESSENTIAL HYPERTENSION WITH GOAL BLOOD PRESSURE LESS THAN 140/90: ICD-10-CM

## 2017-10-24 DIAGNOSIS — K21.9 GASTROESOPHAGEAL REFLUX DISEASE WITHOUT ESOPHAGITIS: ICD-10-CM

## 2017-10-24 DIAGNOSIS — F31.81 BIPOLAR 2 DISORDER (H): Primary | ICD-10-CM

## 2017-10-24 PROCEDURE — 99607 MTMS BY PHARM ADDL 15 MIN: CPT | Performed by: PHARMACIST

## 2017-10-24 PROCEDURE — 99606 MTMS BY PHARM EST 15 MIN: CPT | Performed by: PHARMACIST

## 2017-10-24 RX ORDER — ZIPRASIDONE HYDROCHLORIDE 60 MG/1
60 CAPSULE ORAL DAILY
COMMUNITY
End: 2017-12-11

## 2017-10-24 RX ORDER — SERTRALINE HYDROCHLORIDE 100 MG/1
100 TABLET, FILM COATED ORAL DAILY
COMMUNITY
End: 2017-12-11

## 2017-10-24 NOTE — PROGRESS NOTES
SUBJECTIVE/OBJECTIVE:                                                    Joel Pineda is a 41 year old male called for follow up visit for Medication Therapy Management.  He was referred to me from Ksenia Lyles.     Chief Complaint: Medication Review. Last visit 8/21/2017.    Allergies/ADRs: Reviewed in Epic  Tobacco: No tobacco use   Alcohol: none  PMH: Reviewed in Epic    Medication Adherence: no issues reported    Mental Health: going to start partial hospitalization program at [facility] November 1st. Therapy from 9-4:30 M-F for 2-3 weeks. Patient was hospitalized at [place] at the end of September for suicidal thoughts. Dosages of medications were changed. Patient is currently taking Geodon 60mg at night, Lithium Er 450mg at night, sertraline 100mg daily and Adderall was discontinued (which helped with suicidial. Patient has only had one hallucination in the last week. Patient's mental health provider recommended    Hypertension: Current medications include metoprolol XL 100mg daily.  Patient does not self-monitor BP.  Patient reports the following medication side effects: none    GERD: Current medications include: Prilosec (omeprazole) 20 once daily. Pt c/o no current symptoms.  Patient feels that current regimen is effective. Patient decreased his dose of omeprazole to 20mg daily and is tolerating well.     Pain: lyrica dose was decreased to pregabalin 75mg bid. Patient takes oxycodone about once every 3 days or so. Patient is tolerating pain okay. Patient has been using a little more zanaflex 4mg qd-bid prn for some jaw pain.     Current labs include:  BP Readings from Last 3 Encounters:   09/06/17 122/78   08/16/17 134/80   07/10/17 132/88     Today's Vitals: There were no vitals taken for this visit.  Lab Results   Component Value Date    A1C 5.5 05/16/2017   .  Lab Results   Component Value Date    CHOL 165 02/24/2017     Lab Results   Component Value Date    TRIG 326 02/24/2017     Lab  Results   Component Value Date    HDL 33 02/24/2017     Lab Results   Component Value Date    LDL 67 02/24/2017       Liver Function Studies -   Recent Labs   Lab Test  01/03/17   0825   PROTTOTAL  7.2   ALBUMIN  4.2   BILITOTAL  0.3   ALKPHOS  66   AST  18   ALT  27       Lab Results   Component Value Date    UCRR 46 11/01/2016    MICROL 11 11/01/2016    UMALCR 24.56 (H) 11/01/2016       Last Basic Metabolic Panel:  Lab Results   Component Value Date     05/03/2017      Lab Results   Component Value Date    POTASSIUM 4.1 05/03/2017     Lab Results   Component Value Date    CHLORIDE 108 05/03/2017     Lab Results   Component Value Date    BUN 16 05/03/2017     Lab Results   Component Value Date    CR 1.19 05/03/2017     GFR Estimate   Date Value Ref Range Status   05/03/2017 66 >60 mL/min/1.7m2 Final     Comment:     Non  GFR Calc   01/03/2017 59 (L) >60 mL/min/1.7m2 Final     Comment:     Non  GFR Calc   11/01/2016 73 >60 mL/min/1.7m2 Final     Comment:     Non  GFR Calc     GFR Estimate If Black   Date Value Ref Range Status   05/03/2017 80 >60 mL/min/1.7m2 Final     Comment:      GFR Calc   01/03/2017 71 >60 mL/min/1.7m2 Final     Comment:      GFR Calc   11/01/2016 88 >60 mL/min/1.7m2 Final     Comment:      GFR Calc     TSH   Date Value Ref Range Status   11/01/2016 2.76 0.40 - 4.00 mU/L Final   ]    Most Recent Immunizations   Administered Date(s) Administered     Influenza (IIV3) 09/09/2013     Influenza Vaccine IM 3yrs+ 4 Valent IIV4 10/11/2016     Pneumococcal (PCV 13) 10/02/2015     Pneumococcal 23 valent 10/11/2016     TD (ADULT, 7+) 10/04/2002     TDAP Vaccine (Adacel) 07/16/2010     ASSESSMENT:                                                       Current medications were reviewed today. Much of the visit spent today updating changes in patient's medication list.    Medication Adherence: no issues  identified    Mental Health: needs improvement. Recent changes made.    Hypertension: Stable. Pt would benefit from the following changes - continued BP monitoring and monitoring any changes in mood.    PLAN:                                                      No medication changes made today.    I spent 30 minutes with this patient today. All changes were made via collaborative practice agreement with Ksenia Lyles. A copy of the visit note was provided to the patient's primary care provider.    Will follow up in 8 weeks.    The patient was sent via [employer] a summary of these recommendations as an after visit summary.     Radha Mcdonald, Pharm.D, BCACP  Medication Therapy Management Pharmacist

## 2017-10-24 NOTE — MR AVS SNAPSHOT
After Visit Summary   10/24/2017    Joel Pineda    MRN: [id number]           Patient Information     Date Of Birth          1975        Visit Information        Provider Department      10/24/2017 1:30 PM Radha Mcdonald, [facility]        Today's Diagnoses     Bipolar 2 disorder (H)    -  1    Essential hypertension with goal blood pressure less than 140/90        Chronic pain syndrome        Gastroesophageal reflux disease without esophagitis          Care Instructions    No medication changes made today.          Follow-ups after your visit        Your next 10 appointments already scheduled     Oct 30, 2017  1:00 PM CDT   Return Visit with Radha Sun MD   [facility] ([facility]    [address] [place] [address] [place]  [facility] [address] [phone]            Dec 11, 2017  2:00 PM CST   TELEMEDICINE with Radha Mcdonald [facility] ([facility])    [address] [place] [address] [place]  [address]  [facility] [id number] [phone]           Note: this is not an onsite visit; there is no need to come to the facility.              Who to contact     If you have questions or need follow up information about today's clinic visit or your schedule please contact [facility] directly at [phone].  Normal or non-critical lab and imaging results will be communicated to you by MyChart, letter or phone within 4 business days after the clinic has received the results. If you do not hear from us within 7 days, please contact the clinic through MyChart or phone. If you have a critical or abnormal lab result, we will notify you by phone as soon as possible.  Submit refill requests through PathCentral or call your pharmacy and they will forward the refill request to us. Please allow 3 business days for your refill to be completed.          Additional Information About Your  Visit        ZenputNorthport Information     Koding gives you secure access to your electronic health record. If you see a primary care provider, you can also send messages to your care team and make appointments. If you have questions, please call your primary care clinic.  If you do not have a primary care provider, please call [phone] and they will assist you.        Care EveryWhere ID     This is your Care EveryWhere ID. This could be used by other organizations to access your [place] medical records  [id number]         Blood Pressure from Last 3 Encounters:   09/06/17 122/78   08/16/17 134/80   07/10/17 132/88    Weight from Last 3 Encounters:   09/06/17 (!) 310 lb 11.2 oz (140.9 kg)   08/16/17 (!) 314 lb 12.8 oz (142.8 kg)   07/10/17 (!) 306 lb 4.8 oz (138.9 kg)              Today, you had the following     No orders found for display         Today's Medication Changes          These changes are accurate as of: 10/24/17  2:50 PM.  If you have any questions, ask your nurse or doctor.               These medicines have changed or have updated prescriptions.        Dose/Directions    cetirizine 10 MG tablet   Commonly known as:  zyrTEC   This may have changed:  when to take this   Used for:  Cough        Dose:  10 mg   Take 1 tablet (10 mg) by mouth At Bedtime   Quantity:  30 tablet   Refills:  11       GEODON 60 MG capsule   This may have changed:  Another medication with the same name was removed. Continue taking this medication, and follow the directions you see here.   Generic drug:  ziprasidone        Dose:  60 mg   Take 60 mg by mouth daily   Refills:  0       sertraline 100 MG tablet   Commonly known as:  ZOLOFT   This may have changed:  Another medication with the same name was removed. Continue taking this medication, and follow the directions you see here.        Dose:  100 mg   Take 100 mg by mouth daily   Refills:  0                Primary Care Provider Office Phone # Fax #    Ksenia Lyles,  [phone]       [address] [facility] [address]        Goals        General    I will continue to attend Psychiatry appointments for medication management, 1/14/2014 (pt-stated)     Notes - Note edited  8/24/2016  4:04 PM by Dalila Cavazos LSW    As of today's date 8/24/2016 goal is met at 51 - 75%.   Goal Status:  Ongoing Sees  Therapist every other week and Psych PA every 3 weeks.  As of today's date 5/25/2016 goal is met at 51 - 75%.   Goal Status:  Showing progress-  Meeting with Psych PA for second time tomorrow  To review meds As of today's date 4/11/2016 goal is met at 51 - 75%.   Goal Status:  Showing progress  As of today's date 1/14/2014 goal is met at 51 - 75%.   Goal Status:  Active        I will schedule therapy with new counselor, 1/14/2014 (pt-stated)     Notes - Note edited  5/25/2016  1:54 PM by Dalila Cavazos LSW    As of today's date 5/25/2016 goal is met at 76 - 100%.   Goal Status:  Ongoing New therapist and Psych PA on a regular basis  As of today's date 5/10/2016 goal is met at 76 - 100%.   Goal Status:  Ongoing met with new therapist at [facility]  As of today's date 4/11/2016 goal is met at 51 - 75%.   Goal Status:  Ongoing meets with therapist regularly  As of today's date 1/14/2014 goal is met at 0 - 25%.   Goal Status:  Active        Psychosocial I need some help with cleaning (pt-stated)     Notes - Note edited  6/23/2016  1:57 PM by Dalila Cavazos LSW    As of today's date 6/23/2016 goal is met at 26 - 50%.   Goal Status:  Showing progress met with [facility]. Has not yet made a decision  As of today's date 5/25/2016 goal is met at 0 - 25%.   Goal Status:  Ongoing agency had to reschedule appt.  As of today's date 5/10/2016 goal is met at 0 - 25%.   Goal Status:  Active referral to [facility]        Equal Access to Services     ANNA CANO : ramo Thomasaxda luqadaha, isabell maloney  la'maureen echevarria. So [facility] [phone].    ATENCIÓN: Si habla eulalia, tiene a faustin disposición servicios gratuitos de asistencia lingüística. Madelyn eduardo [phone].    We comply with applicable federal civil rights laws and [place] laws. We do not discriminate on the basis of race, color, national origin, age, disability, sex, sexual orientation, or gender identity.            Thank you!     Thank you for choosing [facility]  for your care. Our goal is always to provide you with excellent care. Hearing back from our patients is one way we can continue to improve our services. Please take a few minutes to complete the written survey that you may receive in the mail after your visit with us. Thank you!             Your Updated Medication List - Protect others around you: Learn how to safely use, store and throw away your medicines at www.disposemymeds.org.          This list is accurate as of: 10/24/17  2:50 PM.  Always use your most recent med list.                   Brand Name Dispense Instructions for use Diagnosis    acetaminophen 500 MG Caps     60 capsule    Take 500 mg by mouth every 4 hours as needed        albuterol 108 (90 BASE) MCG/ACT Inhaler    PROAIR HFA/PROVENTIL HFA/VENTOLIN HFA     Inhale 2 puffs into the lungs every 4 hours as needed        atorvastatin 40 MG tablet    LIPITOR    90 tablet    TAKE 1 TABLET (40 MG) BY MOUTH DAILY    Mixed hyperlipidemia       cetirizine 10 MG tablet    zyrTEC    30 tablet    Take 1 tablet (10 mg) by mouth At Bedtime    Cough       clonazePAM 1 MG tablet    klonoPIN    60 tablet    0.5 mg 2 times daily as needed    Depression with anxiety       cyanocobalamin 1000 MCG/ML injection    VITAMIN B12    0.9 mL    Inject 1 mL (1,000 mcg) into the muscle every 30 days    B12 deficiency       dicyclomine 20 MG tablet    BENTYL    120 tablet    Take 1 tablet (20 mg) by mouth 4 times daily as needed    Irritable bowel syndrome with diarrhea       EPINEPHrine 0.3  MG/0.3ML injection 2-pack    EPIPEN/ADRENACLICK/or ANY BX GENERIC EQUIV    0.6 mL    Inject 0.3 mLs (0.3 mg) into the muscle once as needed for anaphylaxis    Food allergy       fenofibrate 145 MG tablet     90 tablet    Take 1 tablet (145 mg) by mouth daily    Mixed hyperlipidemia       GEODON 60 MG capsule   Generic drug:  ziprasidone      Take 60 mg by mouth daily        levothyroxine 75 MCG tablet    SYNTHROID/LEVOTHROID    90 tablet    Take 1 tablet (75 mcg) by mouth every morning    Myxedema heart disease (H)       lidocaine 5 % ointment    XYLOCAINE    250 g    Apply topically 3 times daily as needed for moderate pain Apply as directed    Chronic midline low back pain without sciatica       lithium 450 MG CR tablet    ESKALITH    75 tablet    Take 450 mg by mouth At Bedtime        metoprolol 100 MG 24 hr tablet    TOPROL-XL    30 tablet    Take 1 tablet (100 mg) by mouth daily    Essential hypertension with goal blood pressure less than 140/90       omeprazole 20 MG tablet      Take 20 mg by mouth daily        ondansetron 8 MG tablet    ZOFRAN    20 tablet    TAKE 1 TABLET (8 MG) BY MOUTH EVERY 8 HOURS AS NEEDED FOR NAUSEA/VOMITING.    Nausea       * order for DME     12 each    Equipment being ordered: 1 ml tuberculin syringes to be used for Vitamin B12 injections.    Vitamin B12 deficiency (non anaemic)       * order for DME     1 Units    SI-loc belt    SI (sacroiliac) joint dysfunction       * order for DME      Respironics REMSTAR 60 Series Auto CPAP 10-12 cm H2O, Wisp nasal mask w/a large cushion and a chinstrap        oxyCODONE 5 MG IR tablet    ROXICODONE    35 tablet    Take 1-2 tablets (5-10 mg) by mouth every 6 hours as needed for pain (maximum 4 tablet(s) per day)    Facet arthropathy       pregabalin 75 MG capsule    LYRICA    60 capsule    Take 1 capsule (75 mg) by mouth 2 times daily    Chronic midline low back pain without sciatica       prochlorperazine 10 MG tablet    COMPAZINE    30 tablet     Take 1 tablet (10 mg) by mouth 3 times daily as needed for nausea    Nausea       ramipril 5 MG capsule    ALTACE    90 capsule    Take 1 capsule (5 mg) by mouth daily    Essential hypertension       sertraline 100 MG tablet    ZOLOFT     Take 100 mg by mouth daily        tiZANidine 4 MG tablet    ZANAFLEX    90 tablet    Take 1 tablet (4 mg) by mouth 3 times daily as needed for muscle spasms    Dorsalgia       VISTARIL 50 MG capsule   Generic drug:  hydrOXYzine      Take 50 mg by mouth as needed for itching        vitamin D3 87677 UNITS capsule    CHOLECALCIFEROL    24 capsule    Take one capsule every two weeks.    Vitamin D deficiency       * Notice:  This list has 3 medication(s) that are the same as other medications prescribed for you. Read the directions carefully, and ask your doctor or other care provider to review them with you.

## 2017-10-30 ENCOUNTER — OFFICE VISIT (OUTPATIENT)
Dept: OTOLARYNGOLOGY | Facility: CLINIC | Age: 42
End: 2017-10-30
Payer: COMMERCIAL

## 2017-10-30 DIAGNOSIS — M26.609 TMJ (TEMPOROMANDIBULAR JOINT SYNDROME): Primary | ICD-10-CM

## 2017-10-30 PROCEDURE — 99203 OFFICE O/P NEW LOW 30 MIN: CPT | Performed by: OTOLARYNGOLOGY

## 2017-10-30 ASSESSMENT — PAIN SCALES - GENERAL: PAINLEVEL: NO PAIN (0)

## 2017-10-30 NOTE — NURSING NOTE
"Joel Pineda's goals for this visit include:   Chief Complaint   Patient presents with     Otalgia     both ears, intermittent       He requests these members of his care team be copied on today's visit information: Ksenia Lyles      PCP: Ksenia Lyles    Referring Provider:  No referring provider defined for this encounter.    Chief Complaint   Patient presents with     Otalgia     both ears, intermittent       Initial There were no vitals taken for this visit. Estimated body mass index is 36.84 kg/(m^2) as calculated from the following:    Height as of 9/6/17: 1.956 m (6' 5\").    Weight as of 9/6/17: 140.9 kg (310 lb 11.2 oz).  Medication Reconciliation: complete    Do you need any medication refills at today's visit? No    Farida Galvez RN    "

## 2017-10-30 NOTE — MR AVS SNAPSHOT
After Visit Summary   10/30/2017    Joel Pineda    MRN: [id number]           Patient Information     Date Of Birth          1975        Visit Information        Provider Department      10/30/2017 1:00 PM Radha Sun MD [facility]        Today's Diagnoses     TMJ (temporomandibular joint syndrome)    -  1       Follow-ups after your visit        Your next 10 appointments already scheduled     Dec 11, 2017  2:00 PM CST   TELEMEDICINE with Radha Mcdonald [facility] ([facility])    [address] [place] [address]  [address]  [facility] [address] [phone]           Note: this is not an onsite visit; there is no need to come to the facility.              Who to contact     If you have questions or need follow up information about today's clinic visit or your schedule please contact [facility] directly at [phone].  Normal or non-critical lab and imaging results will be communicated to you by [email or web address], letter or phone within 4 business days after the clinic has received the results. If you do not hear from us within 7 days, please contact the clinic through Magic Wheelst or phone. If you have a critical or abnormal lab result, we will notify you by phone as soon as possible.  Submit refill requests through RealtimeBoard or call your pharmacy and they will forward the refill request to us. Please allow 3 business days for your refill to be completed.          Additional Information About Your Visit        [email or web address] Information     RealtimeBoard gives you secure access to your electronic health record. If you see a primary care provider, you can also send messages to your care team and make appointments. If you have questions, please call your primary care clinic.  If you do not have a primary care provider, please call [phone] and they will assist you.      RealtimeBoard is an electronic gateway that provides easy,  online access to your medical records. With iDoneThis, you can request a clinic appointment, read your test results, renew a prescription or communicate with your care team.     To access your existing account, please contact your [facility] or call [phone] for assistance.        Care EveryWhere ID     This is your Care EveryWhere ID. This could be used by other organizations to access your [place] medical records  [id number]         Blood Pressure from Last 3 Encounters:   09/06/17 122/78   08/16/17 134/80   07/10/17 132/88    Weight from Last 3 Encounters:   09/06/17 (!) 140.9 kg (310 lb 11.2 oz)   08/16/17 (!) 142.8 kg (314 lb 12.8 oz)   07/10/17 (!) 138.9 kg (306 lb 4.8 oz)              Today, you had the following     No orders found for display         Today's Medication Changes          These changes are accurate as of: 10/30/17  1:31 PM.  If you have any questions, ask your nurse or doctor.               These medicines have changed or have updated prescriptions.        Dose/Directions    cetirizine 10 MG tablet   Commonly known as:  zyrTEC   This may have changed:  when to take this   Used for:  Cough        Dose:  10 mg   Take 1 tablet (10 mg) by mouth At Bedtime   Quantity:  30 tablet   Refills:  11                Primary Care Provider Office Phone # Fax #    Ksenia Shady Lyles [phone] [address] [facility] [address]        Goals        General    I will continue to attend Psychiatry appointments for medication management, 1/14/2014 (pt-stated)     Notes - Note edited  8/24/2016  4:04 PM by Dalila Cavazos LSW    As of today's date 8/24/2016 goal is met at 51 - 75%.   Goal Status:  Ongoing Sees  Therapist every other week and Psych PA every 3 weeks.  As of today's date 5/25/2016 goal is met at 51 - 75%.   Goal Status:  Showing progress-  Meeting with Psych PA for second time tomorrow  To review meds As of today's date  4/11/2016 goal is met at 51 - 75%.   Goal Status:  Showing progress  As of today's date 1/14/2014 goal is met at 51 - 75%.   Goal Status:  Active        I will schedule therapy with new counselor, 1/14/2014 (pt-stated)     Notes - Note edited  5/25/2016  1:54 PM by Dalila Cavazos LSW    As of today's date 5/25/2016 goal is met at 76 - 100%.   Goal Status:  Ongoing New therapist and Psych PA on a regular basis  As of today's date 5/10/2016 goal is met at 76 - 100%.   Goal Status:  Ongoing met with new therapist at [facility]  As of today's date 4/11/2016 goal is met at 51 - 75%.   Goal Status:  Ongoing meets with therapist regularly  As of today's date 1/14/2014 goal is met at 0 - 25%.   Goal Status:  Active        Psychosocial I need some help with cleaning (pt-stated)     Notes - Note edited  6/23/2016  1:57 PM by Dalila Cavazos LSW    As of today's date 6/23/2016 goal is met at 26 - 50%.   Goal Status:  Showing progress met with [facility]. Has not yet made a decision  As of today's date 5/25/2016 goal is met at 0 - 25%.   Goal Status:  Ongoing agency had to reschedule appt.  As of today's date 5/10/2016 goal is met at 0 - 25%.   Goal Status:  Active referral to [facility]        Equal Access to Services     [facility]: Hadii aad ku hadasho Soomaali, waaxda luqadaha, qaybta kaalmada adeegyada, isabell shankar . So [facility] [phone].    ATENCIÓN: Si habla español, tiene a faustin disposición servicios gratuitos de asistencia lingüística. Llame al [phone].    We comply with applicable federal civil rights laws and [place] laws. We do not discriminate on the basis of race, color, national origin, age, disability, sex, sexual orientation, or gender identity.            Thank you!     Thank you for choosing [facility]  for your care. Our goal is always to provide you with excellent care. Hearing back from our patients is one way we can continue to improve our services.  Please take a few minutes to complete the written survey that you may receive in the mail after your visit with us. Thank you!             Your Updated Medication List - Protect others around you: Learn how to safely use, store and throw away your medicines at www.disposemymeds.org.          This list is accurate as of: 10/30/17  1:31 PM.  Always use your most recent med list.                   Brand Name Dispense Instructions for use Diagnosis    acetaminophen 500 MG Caps     60 capsule    Take 500 mg by mouth every 4 hours as needed        albuterol 108 (90 BASE) MCG/ACT Inhaler    PROAIR HFA/PROVENTIL HFA/VENTOLIN HFA     Inhale 2 puffs into the lungs every 4 hours as needed        atorvastatin 40 MG tablet    LIPITOR    90 tablet    TAKE 1 TABLET (40 MG) BY MOUTH DAILY    Mixed hyperlipidemia       cetirizine 10 MG tablet    zyrTEC    30 tablet    Take 1 tablet (10 mg) by mouth At Bedtime    Cough       clonazePAM 1 MG tablet    klonoPIN    60 tablet    0.5 mg 2 times daily as needed    Depression with anxiety       cyanocobalamin 1000 MCG/ML injection    VITAMIN B12    0.9 mL    Inject 1 mL (1,000 mcg) into the muscle every 30 days    B12 deficiency       dicyclomine 20 MG tablet    BENTYL    120 tablet    Take 1 tablet (20 mg) by mouth 4 times daily as needed    Irritable bowel syndrome with diarrhea       EPINEPHrine 0.3 MG/0.3ML injection 2-pack    EPIPEN/ADRENACLICK/or ANY BX GENERIC EQUIV    0.6 mL    Inject 0.3 mLs (0.3 mg) into the muscle once as needed for anaphylaxis    Food allergy       fenofibrate 145 MG tablet     90 tablet    Take 1 tablet (145 mg) by mouth daily    Mixed hyperlipidemia       GEODON 60 MG capsule   Generic drug:  ziprasidone      Take 60 mg by mouth daily        levothyroxine 75 MCG tablet    SYNTHROID/LEVOTHROID    90 tablet    Take 1 tablet (75 mcg) by mouth every morning    Myxedema heart disease (H)       lidocaine 5 % ointment    XYLOCAINE    250 g    Apply topically 3 times  daily as needed for moderate pain Apply as directed    Chronic midline low back pain without sciatica       lithium 450 MG CR tablet    ESKALITH    75 tablet    Take 450 mg by mouth At Bedtime        metoprolol 100 MG 24 hr tablet    TOPROL-XL    30 tablet    Take 1 tablet (100 mg) by mouth daily    Essential hypertension with goal blood pressure less than 140/90       omeprazole 20 MG tablet      Take 20 mg by mouth daily        ondansetron 8 MG tablet    ZOFRAN    20 tablet    TAKE 1 TABLET (8 MG) BY MOUTH EVERY 8 HOURS AS NEEDED FOR NAUSEA/VOMITING.    Nausea       * order for DME     12 each    Equipment being ordered: 1 ml tuberculin syringes to be used for Vitamin B12 injections.    Vitamin B12 deficiency (non anaemic)       * order for DME     1 Units    SI-loc belt    SI (sacroiliac) joint dysfunction       * order for DME      Respironics REMSTAR 60 Series Auto CPAP 10-12 cm H2O, Wisp nasal mask w/a large cushion and a chinstrap        oxyCODONE 5 MG IR tablet    ROXICODONE    35 tablet    Take 1-2 tablets (5-10 mg) by mouth every 6 hours as needed for pain (maximum 4 tablet(s) per day)    Facet arthropathy       pregabalin 75 MG capsule    LYRICA    60 capsule    Take 1 capsule (75 mg) by mouth 2 times daily    Chronic midline low back pain without sciatica       prochlorperazine 10 MG tablet    COMPAZINE    30 tablet    Take 1 tablet (10 mg) by mouth 3 times daily as needed for nausea    Nausea       ramipril 5 MG capsule    ALTACE    90 capsule    Take 1 capsule (5 mg) by mouth daily    Essential hypertension       sertraline 100 MG tablet    ZOLOFT     Take 100 mg by mouth daily        tiZANidine 4 MG tablet    ZANAFLEX    90 tablet    Take 1 tablet (4 mg) by mouth 3 times daily as needed for muscle spasms    Dorsalgia       VISTARIL 50 MG capsule   Generic drug:  hydrOXYzine      Take 50 mg by mouth as needed for itching        vitamin D3 74208 UNITS capsule    CHOLECALCIFEROL    24 capsule    Take one  capsule every two weeks.    Vitamin D deficiency       * Notice:  This list has 3 medication(s) that are the same as other medications prescribed for you. Read the directions carefully, and ask your doctor or other care provider to review them with you.

## 2017-10-30 NOTE — PROGRESS NOTES
Otolaryngology Adult Consultation    Patient: Joel Pineda  : 1975      HPI:  Joel Pineda is a 42 year old male seen today in the Otolaryngology Clinic for ear pain.  Patient reports that he began having pain in about May of this year. First it was left-sided. He was on an air flight to [place] and he felt like his ears got full. He attempted to pop them and felt discomfort in his left ear as well as air rushing tenderness week. He denies any upper respiratory infection at that time. Since then the pain has moved more so to the right side. It feels like a dull ache almost like a toothache in his ear he reports. There is no drainage. He's never had any ear surgery. Of note he has been using a mandibular advancement device starting in April of this year. This is to treat his sleep apnea. He notes that an x-ray taken at the time of the fabrication showed some right TMJ disarticulation issues. He's not totally sure what this means but he has been advancing the mandibular advancement device. He does do his reset exercises in the morning.    Medications:  Current Outpatient Rx   Medication Sig Dispense Refill     ziprasidone (GEODON) 60 MG capsule Take 60 mg by mouth daily       sertraline (ZOLOFT) 100 MG tablet Take 100 mg by mouth daily       cyanocobalamin (VITAMIN B12) 1000 MCG/ML injection Inject 1 mL (1,000 mcg) into the muscle every 30 days 0.9 mL 0     fenofibrate 145 MG tablet Take 1 tablet (145 mg) by mouth daily 90 tablet 1     metoprolol (TOPROL-XL) 100 MG 24 hr tablet Take 1 tablet (100 mg) by mouth daily 30 tablet 11     levothyroxine (SYNTHROID/LEVOTHROID) 75 MCG tablet Take 1 tablet (75 mcg) by mouth every morning 90 tablet 0     pregabalin (LYRICA) 75 MG capsule Take 1 capsule (75 mg) by mouth 2 times daily 60 capsule 5     oxyCODONE (ROXICODONE) 5 MG IR tablet Take 1-2 tablets (5-10 mg) by mouth every 6 hours as needed for pain (maximum 4 tablet(s) per day) 35 tablet 0     atorvastatin  (LIPITOR) 40 MG tablet TAKE 1 TABLET (40 MG) BY MOUTH DAILY 90 tablet 1     vitamin D3 (CHOLECALCIFEROL) 04537 UNITS capsule Take one capsule every two weeks. 24 capsule 1     hydrOXYzine (VISTARIL) 50 MG capsule Take 50 mg by mouth as needed for itching       tiZANidine (ZANAFLEX) 4 MG tablet Take 1 tablet (4 mg) by mouth 3 times daily as needed for muscle spasms 90 tablet 5     albuterol (PROAIR HFA/PROVENTIL HFA/VENTOLIN HFA) 108 (90 BASE) MCG/ACT Inhaler Inhale 2 puffs into the lungs every 4 hours as needed       ondansetron (ZOFRAN) 8 MG tablet TAKE 1 TABLET (8 MG) BY MOUTH EVERY 8 HOURS AS NEEDED FOR NAUSEA/VOMITING. 20 tablet 5     EPINEPHrine 0.3 MG/0.3ML injection Inject 0.3 mLs (0.3 mg) into the muscle once as needed for anaphylaxis 0.6 mL 3     order for DME Respironics REMSTAR 60 Series Auto CPAP 10-12 cm H2O, Wisp nasal mask w/a large cushion and a chinstrap       ramipril (ALTACE) 5 MG capsule Take 1 capsule (5 mg) by mouth daily 90 capsule 3     lidocaine (XYLOCAINE) 5 % ointment Apply topically 3 times daily as needed for moderate pain Apply as directed 250 g 5     dicyclomine (BENTYL) 20 MG tablet Take 1 tablet (20 mg) by mouth 4 times daily as needed (Patient not taking: Reported on 10/24/2017) 120 tablet 5     lithium (ESKALITH) 450 MG CR tablet Take 450 mg by mouth At Bedtime  75 tablet 0     prochlorperazine (COMPAZINE) 10 MG tablet Take 1 tablet (10 mg) by mouth 3 times daily as needed for nausea (Patient not taking: Reported on 10/24/2017) 30 tablet 2     acetaminophen 500 MG CAPS Take 500 mg by mouth every 4 hours as needed 60 capsule      clonazePAM (KLONOPIN) 1 MG tablet 0.5 mg 2 times daily as needed  60 tablet      cetirizine (ZYRTEC) 10 MG tablet Take 1 tablet (10 mg) by mouth At Bedtime (Patient taking differently: Take 10 mg by mouth daily ) 30 tablet 11     order for DME SI-loc belt 1 Units 0     order for DME Equipment being ordered: 1 ml tuberculin syringes to be used for Vitamin  B12 injections. 12 each 1     omeprazole 20 MG tablet Take 20 mg by mouth daily          Allergies: Banana; Nitroglycerin; Penicillins; Provigil [modafinil]; Ciprofloxacin; Gadolinium; Dulera; Dye [contrast dye]; Levaquin; Levofloxacin; Neurontin [gabapentin]; Nortriptyline; Varicella virus vaccine live; Ciprofloxacin; Flagyl [metronidazole hcl]; Latex; Metronidazole; and No clinical screening - see comments     PMH:  Past Medical History:   Diagnosis Date     Acne      Chest pain     Chest pain, regulated w/BP meds. Clear arteries.     Skin exam, screening for cancer 12/3/2013       PSH:  Past Surgical History:   Procedure Laterality Date     BACK SURGERY  10/07    lumbar discectomy L5-S1     COLONOSCOPY      Note: colonoscopy scheduled with [facility] on Friday, 9/4/15     COSMETIC SURGERY  2012    Nose Exterior - functional     GI SURGERY  August 2013    Sigmoidectomy     HERNIA REPAIR, UMBILICAL  8/23/11    Dr. Evan whiting     INCISION AND DRAINAGE, ABSCESS, COMPLEX  8/23/11    umbilical, Dr. Evan Beavers     LAPAROSCOPIC ASSISTED COLECTOMY LEFT (DESCENDING)  8/15/2013    Procedure: LAPAROSCOPIC ASSISTED COLECTOMY LEFT (DESCENDING);  Laparoscopic Hand Assisted Sigmoid Resection, Mobilization of Splenic Fissure, coloproctoscopy, *Latex Free Room* Anesthesia General with Pain block  ;  Surgeon: Aurora Justice MD;  Location: [facility] OR     NERVE SURGERY  8/18/11    RF ablation @ L3-S1 @ [facility]     RECONSTRUCT NOSE AND SEPTUM (FUNCTIONAL)  10/14/2011    Procedure:RECONSTRUCT NOSE AND SEPTUM (FUNCTIONAL); Functional Septorhinoplasty, Turbinate Reduction, ; Surgeon:CEDRIC CUEVAS; Location:[facility] OR     SINUS SURGERY  10/1/01    ethmoidectomy chronic sinusitis       FH:  Family History   Problem Relation Age of Onset     Musculoskeletal Disorder Mother      back     Anxiety Disorder Mother      Colon Polyps Mother      Ulcerative Colitis Mother      and ischemic small intestine, surgery     Hypertension Mother       "Breast Cancer Mother      OSTEOPOROSIS Mother      DIABETES Mother      Type 2, Diagnosed in 2014     Depression Mother      Takes Cymbalta to help with chronic pain + depx     Thyroid Disease Mother      Hypothyroidism     Obesity Mother      Under much better control latter half of 2015     Musculoskeletal Disorder Father      back     Substance Abuse Father      Hypertension Father      Hyperlipidemia Father      Depression Father      Off meds for many years. Seems \"ok\"     HEART DISEASE Maternal Grandmother      HEART DISEASE Maternal Grandfather      Psychotic Disorder Paternal Grandfather      Suicide Paternal Grandfather      Depression Paternal Grandfather      Pediatrician. Committed suicide by pistol in 1990.     Musculoskeletal Disorder Brother      back     Depression Brother      Expressed as anger and moodiness     Substance Abuse Sister      Depression Sister      Mental Health Therapist, yet no anti-depressants?     Anxiety Disorder Sister      Mental Health Therapist, yet no anti-anxiety meds?     Other Cancer Other      Bladder Cancer - Fatal     Substance Abuse Brother      Colon Cancer No family hx of      Crohn Disease No family hx of      Anesthesia Reaction No family hx of      CANCER No family hx of      No family history of skin cancer        SH:  Social History   Substance Use Topics     Smoking status: Never Smoker     Smokeless tobacco: Never Used     Alcohol use No      Comment: rare social       Review of Systems  No flowsheet data found.    Physical Exam:    GEN:  The patient is alert, oriented and in no acute distress.  HEAD:  Head, face scalp is grossly normal.  EARS:  Ears demonstrate normal canals, auricles and tympanic membranes.  ORAL:  Oral cavity shows healthy mucosa with out ulceration, masses or other lesions                involving the tongue, palate, buccal mucosa, floor of mouth or gingiva.  With jaw opening there is a little bit of clicking on the right side. And is " tender to palpation.               NECK:   Neck is without adenopathy, thyroid or salivary gland masses.  PUL: normal work of breathing; no stridor  CV: RRR; no peripheral edema  M/S: normal muscle tone     Pertinent previous labs/tests:  Audiogram from October 23, 2017 shows normal hearing bilaterally. Type A tympanograms bilaterally.    Assessment/Plan:  Patient presents with bilateral ear pain, right greater than left. Based off of his physical exam as well as his hearing test I do not believe that this is coming from an ear infection or negative pressure from the middle ear space. Given his history of using a mandibular advancement device I suspect that some of the pain is coming from some TMJ. I discussed this with the patient. He is going to speak to his dentist about potentially readjusting the device. He's also been to continue to use warm compresses as needed. We discussed avoiding chewing gum or anything that might require a lot of chewing in order to alleviate some of the stress of his jaw.    I spent a total of 35 minutes face-to-face with Joel Pineda during today's office visit.  Over 50% of this time was spent counseling the patient on and/or coordinating care as documented in my assessment and plan.

## 2017-11-02 ENCOUNTER — MYC REFILL (OUTPATIENT)
Dept: FAMILY MEDICINE | Facility: CLINIC | Age: 42
End: 2017-11-02

## 2017-11-02 DIAGNOSIS — E53.8 B12 DEFICIENCY: ICD-10-CM

## 2017-11-02 DIAGNOSIS — M47.819 FACET ARTHROPATHY: ICD-10-CM

## 2017-11-02 RX ORDER — CYANOCOBALAMIN 1000 UG/ML
1 INJECTION, SOLUTION INTRAMUSCULAR; SUBCUTANEOUS
Qty: 10 ML | Refills: 1 | Status: SHIPPED | OUTPATIENT
Start: 2017-11-02 | End: 2018-09-23

## 2017-11-02 RX ORDER — OXYCODONE HYDROCHLORIDE 5 MG/1
5-10 TABLET ORAL EVERY 6 HOURS PRN
Qty: 35 TABLET | Refills: 0 | Status: SHIPPED | OUTPATIENT
Start: 2017-11-02 | End: 2018-01-24

## 2017-11-02 NOTE — TELEPHONE ENCOUNTER
Message from [place]:  Original authorizing provider: MD Tito Dickey would like a refill of the following medications:  oxyCODONE (ROXICODONE) 5 MG IR tablet [Ksenia Lyles MD]  cyanocobalamin (VITAMIN B12) 1000 MCG/ML injection [Ksenia Lyles MD]    Preferred pharmacy: [id number] [address] [address]    Comment:  Please have the Oxycodone paper Rx left at the [facility]. Thanks.

## 2017-11-16 ENCOUNTER — TELEPHONE (OUTPATIENT)
Dept: PALLIATIVE MEDICINE | Facility: CLINIC | Age: 42
End: 2017-11-16

## 2017-11-16 DIAGNOSIS — M47.819 FACET ARTHROPATHY: Primary | ICD-10-CM

## 2017-11-16 NOTE — TELEPHONE ENCOUNTER
Pt LM at 0754 stating that he would like to have a repeat RFA. He doesn't think that he needs to do the MBB's prior. Patient would like to know how to go about doing this. Phone #[phone] - ok to leave message    Farida Avalos    [facility]

## 2017-11-16 NOTE — TELEPHONE ENCOUNTER
Pt requesting a repeat radiofrequency ablation.     Need to check insurance coverage for repeat bilateral L3, 4 and 5 lumbar radiofrequency ablation. (M12.88). Dx Facet Arthropathy    Last radiofrequency ablation was 10/26/16.     Pt had 75-80 % relief for about 1 year months from last RFA.      Called pt and informed him/her that insurance would be checked and will be called once we get a response.    Order is pended. Routed to Dr. Diaz to review and sign order. Then route to Lorraine to check insurance.    Radha Bustos, JAVONN, RN  Care Coordinator  [facility]

## 2017-11-17 NOTE — TELEPHONE ENCOUNTER
[facility]    Replaced by [facility] (MEDICARE REPLACEMENT)  o Dx of arthropathy, spondylosis, or sprain;   o  3 months of documented pain and failed conservative treatment (exercise, meds, injections, etc.);   o A course of PT within the last 6 months (or an unfavorable evaluation);  o  2 successful workups resulting in >70% pain relief.  o   o REPEAT RFA:  - Must wait at least 6 months and >70% pain relief following previous RFA.                                         - 1 successful workup resulting in >70% pain relief.  - Prior auth is required.      Needs 1 MBB, routing to have order placed.      Lorraine CHAVARRIA    [facility]

## 2017-11-20 DIAGNOSIS — I10 ESSENTIAL HYPERTENSION: ICD-10-CM

## 2017-11-20 DIAGNOSIS — G89.29 CHRONIC MIDLINE LOW BACK PAIN WITHOUT SCIATICA: ICD-10-CM

## 2017-11-20 DIAGNOSIS — M54.50 CHRONIC MIDLINE LOW BACK PAIN WITHOUT SCIATICA: ICD-10-CM

## 2017-11-20 NOTE — TELEPHONE ENCOUNTER
celecoxib (CELEBREX) 200 MG capsule (Discontinued)      Last Written Prescription Date: 11/17/16  Last Quantity: 180, # refills: 3  Last Office Visit with [facility] or [facility] prescribing provider: 9/6/17       Creatinine   Date Value Ref Range Status   05/03/2017 1.19 0.66 - 1.25 mg/dL Final     Lab Results   Component Value Date    AST 18 01/03/2017     Lab Results   Component Value Date    ALT 27 01/03/2017     BP Readings from Last 3 Encounters:   09/06/17 122/78   08/16/17 134/80   07/10/17 132/88     ramipril (ALTACE) 5 MG capsule      Last Written Prescription Date: 11/18/16  Last Fill Quantity: 90, # refills: 3  Last Office Visit with [facility], [facility] or [facility] prescribing provider: 9/6/17       Potassium   Date Value Ref Range Status   05/03/2017 4.1 3.4 - 5.3 mmol/L Final     Creatinine   Date Value Ref Range Status   05/03/2017 1.19 0.66 - 1.25 mg/dL Final     BP Readings from Last 3 Encounters:   09/06/17 122/78   08/16/17 134/80   07/10/17 132/88

## 2017-11-20 NOTE — TELEPHONE ENCOUNTER
Patient needs 1 MBB before being approved for the repeat RFA. Routing to Dr. Diaz to sign.     JAVON GarciaN, RN  Care Coordinator  [facility] [place]

## 2017-11-22 RX ORDER — RAMIPRIL 5 MG/1
CAPSULE ORAL
Qty: 90 CAPSULE | Refills: 0 | Status: SHIPPED | OUTPATIENT
Start: 2017-11-22 | End: 2018-01-24

## 2017-11-22 RX ORDER — CELECOXIB 200 MG/1
CAPSULE ORAL
Qty: 180 CAPSULE | Refills: 0 | Status: SHIPPED | OUTPATIENT
Start: 2017-11-22 | End: 2018-01-24

## 2017-11-22 NOTE — TELEPHONE ENCOUNTER
Pre-screening questions for Radiology Injections:    Injection to be done at which interventional clinic site? [facility]    Procedure ordered by      Procedure ordered? Lumbar Medial Branch Block    What insurance would patient like us to bill for this procedure? [employer]      [employer]'s comp- Any injection DO NOT SCHEDULE and route to [place].      [employer] insurance - If scheduling an SI joint injection DO NOT SCHEDULE and route to Lorraine Scott.     [employer]- MBB's must be scheduled at LEAST two weeks apart      [employer] - Any injection besides hip/shoulder/knee joint DO NOT SCHEDULE and route to Lorraine Chavez. She will obtain PA and call pt back to schedule procedure or notify pt of denial.      [employer]- PA required for all MBB's    Any chance of pregnancy? Not Applicable   If YES, do NOT schedule and route to RN pool    Is an  needed? No     Patient has a drive home? (mandatory) Yes     Is patient taking any blood thinners (plavix, coumadin, jantoven, warfarin, heparin, pradaxa or dabigatran )? No    If hold needed, do NOT schedule, route to RN pool     Is patient taking any aspirin products? No     If more than 325mg/day do NOT schedule; route to RN pool     For CERVICAL procedures, hold all aspirin products for 6 days.      Does the patient have a bleeding or clotting disorder? No    If YES, okay to schedule AND route to RN nurse pool    **For any patients with platelet count <100, must be forwarded to provider**    Is patient diabetic? no If YES, have them bring their glucometer.    Does patient have an active infection or treated for one within the past week? No    Is patient currently taking any antibiotics?  No    For patients on chronic, preventative, or prophylacti antibiotics, procedures can be scheduled.     For patients on antibiotics for active or recent infection:    Ana Oliver [place], [place], Snitzer-antibiotic course must have been  completed for 4 days     Dr. Whiteside-antibiotic course must have been completed for 7 days    Is patient currently taking any steroid medications? (i.e. Prednisone, Medrol)  No     For patients on steroid medications:    Emily Oliver [place], Snitzer-steroid course must have been completed for 4 days    Dr. Whiteside-steroid course must have been completed for 7 days    Review with patient:  If you are started on any steroids or antibiotics between now and your appointment, you must contact us because it may affect our ability to perform your procedure     Is patient actively being treated for cancer or immunocompromised? No   If YES, do NOT schedule and route to RN    Are you able to get on and off an exam table with minimal or no assistance? Yes   If NO, do NOT schedule and route to RN    Are you able to roll over and lay on your stomach with minimal or no assistance? Yes   If NO, do NOT schedule and route to RN    Any allergies to contrast dye, iodine, shellfish, or numbing and steroid medications? Yes - Contrast Dye  (If so, inform nursing and note in scheduling comments.)    Allergies: Banana; Nitroglycerin; Penicillins; Provigil [modafinil]; Ciprofloxacin; Gadolinium; Dulera; Dye [contrast dye]; Levaquin; Levofloxacin; Neurontin [gabapentin]; Nortriptyline; Varicella virus vaccine live; Ciprofloxacin; Flagyl [metronidazole hcl]; Latex; Metronidazole; and No clinical screening - see comments     Has the patient had a flu shot or any other vaccinations within 7 days before or after the procedure.  No     Does patient have an MRI/CT?  YES:   (SI joint, hip injections, lumbar sympathetic blocks, and stellate ganglion blocks do not require an MRI)    Was the MRI done w/in the last 3 years?  Yes    Was MRI done at [place]? Yes      If not, where was it done? N/A       If MRI was not done at [place], [facility] do NOT schedule and route to nursing.  If pt has an imaging disc, the injection may be  scheduled but pt has to bring disc to appt. If they show up w/out disc the injection cannot be done    **Must be scheduled with elapsed time interval of at least 2 weeks and not more than 6 months between the First MBB and the Second MBB**       Medial Branch Block Pre-Procedure Instructions    It is okay to take long acting pain medications (if you are on them) the day of the procedure but try not to take any short acting medications unless absolutely necessary.         Long acting meds would include: Gabapentin (Neurontin), MS Contin, Oxycontin        Short acting meds would include:  Percocet, Oxycodone, Vicodin, Ibuprofen     The day of the procedure, you should try to do things that provoke your pain, since the injection is being done to see if it will relieve your pain .     If your pain level is a 4 out of 10 or less on the day of the procedure, please call [phone] to reschedule.    Reminders (please tell patient if applicable):      Instructed pt to arrive 30 minutes early for IV start if this is for a cervical procedure, ALL sympathetic (stellate ganglion, hypogastric, or lumbar sympathetic block) and all sedation procedures (RFA, spinal cord stimulation trials).         -IVs are not routinely placed for Dr. Capps cervical cases        -Dr. Montgomery: no IV needed for CMBB       If NPO for sedation, it is okay to take medications with sips of water (except if they are to hold blood thinners).    *DO take blood pressure medication if it is prescribed*      If this is for a cervical MBB aspirin needs to be held for 6 days.        Do not schedule procedures requiring IV placement in the first appointment after lunch       For patients 85 or older we recommend having an adult stay w/ them for the remainder of the day.      Does the patient have any questions? no

## 2017-11-22 NOTE — TELEPHONE ENCOUNTER
Pt given anjana refill and did not make appointment.      equested Prescriptions   Pending Prescriptions Disp Refills     celecoxib (CELEBREX) 200 MG capsule [Pharmacy Med Name: CELECOXIB 200 MG CAPSULE] 180 capsule 3     Sig: TAKE 1 CAPSULE BY MOUTH TWICE DAILY AS NEEDED FORMODERATE PAIN    NSAID Medications Failed    11/20/2017 10:53 AM       Failed - Normal CBC on file in past 12 months    Recent Labs   Lab Test  09/02/16   0914   WBC  10.4   RBC  5.08   HGB  15.8   HCT  46.9   PLT  255            Passed - Blood pressure under 140/90    BP Readings from Last 3 Encounters:   09/06/17 122/78   08/16/17 134/80   07/10/17 132/88                Passed - Normal ALT on file in past 12 months    Recent Labs   Lab Test  01/03/17   0825   ALT  27            Passed - Normal AST on file in past 12 months    Recent Labs   Lab Test  01/03/17   0825   AST  18            Passed - Recent or future visit with authorizing provider's specialty    Patient had office visit in the last year or has a visit in the next 30 days with authorizing provider.  See chart review.              Passed - Patient is age 6-64 years       Passed - Normal serum creatinine on file in past 12 months    Recent Labs   Lab Test  05/03/17   1456   CR  1.19             ramipril (ALTACE) 5 MG capsule [Pharmacy Med Name: RAMIPRIL 5 MG CAPSULE] 90 capsule 3     Sig: TAKE 1 CAPSULE BY MOUTH DAILY    ACE Inhibitors (Including Combos) Protocol Passed    11/20/2017 10:53 AM       Passed - Blood pressure under 140/90    BP Readings from Last 3 Encounters:   09/06/17 122/78   08/16/17 134/80   07/10/17 132/88                Passed - Recent or future visit with authorizing provider's specialty    Patient had office visit in the last year or has a visit in the next 30 days with authorizing provider.  See chart review.              Passed - Patient is age 18 or older       Passed - Normal serum creatinine on file in past 12 months    Recent Labs   Lab Test  05/03/17    1456   CR  1.19            Passed - Normal serum potassium on file in past 12 months    Recent Labs   Lab Test  05/03/17   1456   POTASSIUM  4.1

## 2017-11-27 ENCOUNTER — TELEPHONE (OUTPATIENT)
Dept: FAMILY MEDICINE | Facility: CLINIC | Age: 42
End: 2017-11-27

## 2017-11-27 NOTE — TELEPHONE ENCOUNTER
What type of form? work ([employer])  What day did you drop off your forms? 11/27/2017  Is there a due date? None Given (7-10 business day to compete forms)   How would you like to receive these forms? Please fax to [phone]  Which clinic was the form dropped off at? [place]    What is the best number to contact you? Home [phone]  What time works best to contact you with in 4 hrs? ANY  Is it okay to leave a message? Yes    Emerita Petit

## 2017-12-11 ENCOUNTER — ALLIED HEALTH/NURSE VISIT (OUTPATIENT)
Dept: PHARMACY | Facility: CLINIC | Age: 42
End: 2017-12-11
Payer: COMMERCIAL

## 2017-12-11 DIAGNOSIS — G89.4 CHRONIC PAIN SYNDROME: ICD-10-CM

## 2017-12-11 DIAGNOSIS — F31.81 BIPOLAR 2 DISORDER (H): Primary | ICD-10-CM

## 2017-12-11 DIAGNOSIS — I10 ESSENTIAL HYPERTENSION WITH GOAL BLOOD PRESSURE LESS THAN 140/90: ICD-10-CM

## 2017-12-11 DIAGNOSIS — R73.03 PREDIABETES: ICD-10-CM

## 2017-12-11 DIAGNOSIS — E03.9 HYPOTHYROIDISM, UNSPECIFIED TYPE: ICD-10-CM

## 2017-12-11 PROCEDURE — 99607 MTMS BY PHARM ADDL 15 MIN: CPT | Mod: U4 | Performed by: PHARMACIST

## 2017-12-11 PROCEDURE — 99606 MTMS BY PHARM EST 15 MIN: CPT | Mod: U4 | Performed by: PHARMACIST

## 2017-12-11 RX ORDER — METOPROLOL SUCCINATE 200 MG/1
200 TABLET, EXTENDED RELEASE ORAL DAILY
COMMUNITY
Start: 2017-11-08 | End: 2018-05-17

## 2017-12-11 RX ORDER — LAMOTRIGINE 25 MG/1
200 TABLET ORAL DAILY
COMMUNITY
Start: 2017-12-05 | End: 2018-11-20

## 2017-12-11 RX ORDER — DULOXETIN HYDROCHLORIDE 30 MG/1
90 CAPSULE, DELAYED RELEASE ORAL DAILY
COMMUNITY
Start: 2017-11-24 | End: 2018-03-19

## 2017-12-11 RX ORDER — BENZTROPINE MESYLATE 1 MG/1
1 TABLET ORAL 2 TIMES DAILY
COMMUNITY
Start: 2017-11-29 | End: 2018-03-12

## 2017-12-11 RX ORDER — ALPRAZOLAM 1 MG/1
1 TABLET, EXTENDED RELEASE ORAL EVERY MORNING
COMMUNITY
Start: 2017-12-05 | End: 2018-11-20

## 2017-12-11 NOTE — LETTER
My Asthma Action Plan  Name: Joel Pineda   YOB: 1975  Date: 12/11/2017   My doctor: Radha Mcdonald [facility]   My clinic: [facility]        My Control Medicine: None  My Rescue Medicine: Albuterol (Proair/Ventolin/Proventil) inhaler 2 puffs every 4 hours as needed   My Asthma Severity: intermittent  Avoid your asthma triggers: see below               GREEN ZONE   Good Control    I feel good    No cough or wheeze    Can work, sleep and play without asthma symptoms       Take your asthma control medicine every day.     1. If exercise triggers your asthma, take your rescue medication    15 minutes before exercise or sports, and    During exercise if you have asthma symptoms  2. Spacer to use with inhaler: If you have a spacer, make sure to use it with your inhaler             YELLOW ZONE Getting Worse  I have ANY of these:    I do not feel good    Cough or wheeze    Chest feels tight    Wake up at night   1. Keep taking your Green Zone medications  2. Start taking your rescue medicine:    every 20 minutes for up to 1 hour. Then every 4 hours for 24-48 hours.  3. If you stay in the Yellow Zone for more than 12-24 hours, contact your doctor.  4. If you do not return to the Green Zone in 12-24 hours or you get worse, start taking your oral steroid medicine if prescribed by your provider.           RED ZONE Medical Alert - Get Help  I have ANY of these:    I feel awful    Medicine is not helping    Breathing getting harder    Trouble walking or talking    Nose opens wide to breathe       1. Take your rescue medicine NOW  2. If your provider has prescribed an oral steroid medicine, start taking it NOW  3. Call your doctor NOW  4. If you are still in the Red Zone after 20 minutes and you have not reached your doctor:    Take your rescue medicine again and    Call 911 or go to the emergency room right away    See your regular doctor within 2 weeks of an Emergency Room or Urgent Care  visit for follow-up treatment.        Electronically signed by: Radha Mcdonald, December 11, 2017    Annual Reminders:  Meet with Asthma Educator,  Flu Shot in the Fall, consider Pneumonia Vaccination for patients with asthma (aged 19 and older).    Pharmacy:    [facility] [id number] [place] [facility] [address] [facility] [id number]  [place], [place] [facility] [address] [facility] [place] [facility] [address] [facility]                    Asthma Triggers  How To Control Things That Make Your Asthma Worse    Triggers are things that make your asthma worse.  Look at the list below to help you find your triggers and what you can do about them.  You can help prevent asthma flare-ups by staying away from your triggers.      Trigger                                                          What you can do   Cigarette Smoke  Tobacco smoke can make asthma worse. Do not allow smoking in your home, car or around you.  Be sure no one smokes at a child s day care or school.  If you smoke, ask your health care provider for ways to help you quit.  Ask family members to quit too.  Ask your health care provider for a referral to Quit Plan to help you quit smoking, or call 5-860-197-PLAN.     Colds, Flu, Bronchitis  These are common triggers of asthma. Wash your hands often.  Don t touch your eyes, nose or mouth.  Get a flu shot every year.     Dust Mites  These are tiny bugs that live in cloth or carpet. They are too small to see. Wash sheets and blankets in hot water every week.   Encase pillows and mattress in dust mite proof covers.  Avoid having carpet if you can. If you have carpet, vacuum weekly.   Use a dust mask and HEPA vacuum.   Pollen and Outdoor Mold  Some people are allergic to trees, grass, or weed pollen, or molds. Try to keep your windows closed.  Limit time out doors when pollen count is high.   Ask you health care provider about taking medicine during allergy season.      Animal Dander  Some people are allergic to skin flakes, urine or saliva from pets with fur or feathers. Keep pets with fur or feathers out of your home.    If you can t keep the pet outdoors, then keep the pet out of your bedroom.  Keep the bedroom door closed.  Keep pets off cloth furniture and away from stuffed toys.     Mice, Rats, and Cockroaches  Some people are allergic to the waste from these pests.   Cover food and garbage.  Clean up spills and food crumbs.  Store grease in the refrigerator.   Keep food out of the bedroom.   Indoor Mold  This can be a trigger if your home has high moisture. Fix leaking faucets, pipes, or other sources of water.   Clean moldy surfaces.  Dehumidify basement if it is damp and smelly.   Smoke, Strong Odors, and Sprays  These can reduce air quality. Stay away from strong odors and sprays, such as perfume, powder, hair spray, paints, smoke incense, paint, cleaning products, candles and new carpet.   Exercise or Sports  Some people with asthma have this trigger. Be active!  Ask your doctor about taking medicine before sports or exercise to prevent symptoms.    Warm up for 5-10 minutes before and after sports or exercise.     Other Triggers of Asthma  Cold air:  Cover your nose and mouth with a scarf.  Sometimes laughing or crying can be a trigger.  Some medicines and food can trigger asthma.

## 2017-12-11 NOTE — MR AVS SNAPSHOT
After Visit Summary   12/11/2017    Joel Pineda    MRN: [id number]           Patient Information     Date Of Birth          1975        Visit Information        Provider Department      12/11/2017 2:00 PM Radha Mcdonald, [facility]        Today's Diagnoses     Bipolar 2 disorder (H)    -  1    Essential hypertension with goal blood pressure less than 140/90        Prediabetes        Chronic pain syndrome        Hypothyroidism, unspecified type          Care Instructions    Due for A1c, BMP and microalbumin          Follow-ups after your visit        Your next 10 appointments already scheduled     Jan 29, 2018  2:00 PM CST   TELEMEDICINE with Radha Mcdonald [facility] ([facility])    [address] [place] [address] [place]  [address] [facility]  [facility] [address] [phone]           Note: this is not an onsite visit; there is no need to come to the facility.              Future tests that were ordered for you today     Open Future Orders        Priority Expected Expires Ordered    Hemoglobin A1c Routine  12/11/2018 12/11/2017    Albumin Random Urine Quantitative with Creat Ratio Routine 12/26/2017 12/11/2018 12/11/2017    Basic metabolic panel Routine 12/26/2017 12/11/2018 12/11/2017            Who to contact     If you have questions or need follow up information about today's clinic visit or your schedule please contact [facility] directly at [phone].  Normal or non-critical lab and imaging results will be communicated to you by MyChart, letter or phone within 4 business days after the clinic has received the results. If you do not hear from us within 7 days, please contact the clinic through MyChart or phone. If you have a critical or abnormal lab result, we will notify you by phone as soon as possible.  Submit refill requests through rumr: turn off the lights or call your pharmacy and they will forward the refill  request to us. Please allow 3 business days for your refill to be completed.          Additional Information About Your Visit        [email or web address] Information     vivit gives you secure access to your electronic health record. If you see a primary care provider, you can also send messages to your care team and make appointments. If you have questions, please call your primary care clinic.  If you do not have a primary care provider, please call [phone] and they will assist you.        Care EveryWhere ID     This is your Care EveryWhere ID. This could be used by other organizations to access your [facility] medical records  [id number]         Blood Pressure from Last 3 Encounters:   09/06/17 122/78   08/16/17 134/80   07/10/17 132/88    Weight from Last 3 Encounters:   09/06/17 (!) 310 lb 11.2 oz (140.9 kg)   08/16/17 (!) 314 lb 12.8 oz (142.8 kg)   07/10/17 (!) 306 lb 4.8 oz (138.9 kg)              We Performed the Following     Asthma Action Plan (AAP)          Today's Medication Changes          These changes are accurate as of: 12/11/17  3:54 PM.  If you have any questions, ask your nurse or doctor.               These medicines have changed or have updated prescriptions.        Dose/Directions    cetirizine 10 MG tablet   Commonly known as:  zyrTEC   This may have changed:  when to take this   Used for:  Cough        Dose:  10 mg   Take 1 tablet (10 mg) by mouth At Bedtime   Quantity:  30 tablet   Refills:  11       metoprolol 200 MG 24 hr tablet   Commonly known as:  TOPROL-XL   This may have changed:  Another medication with the same name was removed. Continue taking this medication, and follow the directions you see here.        Dose:  200 mg   Take 200 mg by mouth daily   Refills:  0                Primary Care Provider Office Phone # Fax #    Ksenia Lyles [phone] [address] [facility] [address]        Goals        General    I will continue to attend  Psychiatry appointments for medication management, 1/14/2014 (pt-stated)     Notes - Note edited  8/24/2016  4:04 PM by Dalila Cavazos LSW    As of today's date 8/24/2016 goal is met at 51 - 75%.   Goal Status:  Ongoing Sees  Therapist every other week and Psych PA every 3 weeks.  As of today's date 5/25/2016 goal is met at 51 - 75%.   Goal Status:  Showing progress-  Meeting with Psych PA for second time tomorrow  To review meds As of today's date 4/11/2016 goal is met at 51 - 75%.   Goal Status:  Showing progress  As of today's date 1/14/2014 goal is met at 51 - 75%.   Goal Status:  Active        I will schedule therapy with new counselor, 1/14/2014 (pt-stated)     Notes - Note edited  5/25/2016  1:54 PM by Dalila Cavazos LSW    As of today's date 5/25/2016 goal is met at 76 - 100%.   Goal Status:  Ongoing New therapist and Psych PA on a regular basis  As of today's date 5/10/2016 goal is met at 76 - 100%.   Goal Status:  Ongoing met with new therapist at [facility]  As of today's date 4/11/2016 goal is met at 51 - 75%.   Goal Status:  Ongoing meets with therapist regularly  As of today's date 1/14/2014 goal is met at 0 - 25%.   Goal Status:  Active        Psychosocial I need some help with cleaning (pt-stated)     Notes - Note edited  6/23/2016  1:57 PM by Dalila Cavazos LSW    As of today's date 6/23/2016 goal is met at 26 - 50%.   Goal Status:  Showing progress met with [employer]. Has not yet made a decision  As of today's date 5/25/2016 goal is met at 0 - 25%.   Goal Status:  Ongoing agency had to reschedule appt.  As of today's date 5/10/2016 goal is met at 0 - 25%.   Goal Status:  Active referral to [facility]        Equal Access to Services     [facility] JEROME AH: Madhuri Darby, waalexanderda joeqannie, qaybta isha ojeda, isabell shankar ah. [facility] [phone].    ATENCIÓN: Si habla español, tiene a faustin disposición servicios gratuitos de asistencia lingüística.  Madelyn eduardo [phone].    We comply with applicable federal civil rights laws and [place] laws. We do not discriminate on the basis of race, color, national origin, age, disability, sex, sexual orientation, or gender identity.            Thank you!     Thank you for choosing [facility]  for your care. Our goal is always to provide you with excellent care. Hearing back from our patients is one way we can continue to improve our services. Please take a few minutes to complete the written survey that you may receive in the mail after your visit with us. Thank you!             Your Updated Medication List - Protect others around you: Learn how to safely use, store and throw away your medicines at www.disposemymeds.org.          This list is accurate as of: 12/11/17  3:54 PM.  Always use your most recent med list.                   Brand Name Dispense Instructions for use Diagnosis    acetaminophen 500 MG Caps     60 capsule    Take 500 mg by mouth every 4 hours as needed        albuterol 108 (90 BASE) MCG/ACT Inhaler    PROAIR HFA/PROVENTIL HFA/VENTOLIN HFA     Inhale 2 puffs into the lungs every 4 hours as needed        ALPRAZolam 1 MG 24 hr tablet    XANAX XR     Take 1 mg by mouth every morning        atorvastatin 40 MG tablet    LIPITOR    90 tablet    TAKE 1 TABLET (40 MG) BY MOUTH DAILY    Mixed hyperlipidemia       benztropine 1 MG tablet    COGENTIN     Take 1 mg by mouth 2 times daily        celecoxib 200 MG capsule    celeBREX    180 capsule    TAKE 1 CAPSULE BY MOUTH TWICE DAILY AS NEEDED FORMODERATE PAIN    Chronic midline low back pain without sciatica       cetirizine 10 MG tablet    zyrTEC    30 tablet    Take 1 tablet (10 mg) by mouth At Bedtime    Cough       COMPOUND CONTAINING CONTROLLED SUBSTANCE - PHARMACY TO MIX COMPOUNDED MEDICATION    CMPD RX     Apply 8 sprays into one nostril as directed every other day Ketamine 150mg/ml        cyanocobalamin 1000 MCG/ML injection     VITAMIN B12    10 mL    Inject 1 mL (1,000 mcg) into the muscle every 30 days    B12 deficiency       dicyclomine 20 MG tablet    BENTYL    120 tablet    Take 1 tablet (20 mg) by mouth 4 times daily as needed    Irritable bowel syndrome with diarrhea       DULoxetine 30 MG EC capsule    CYMBALTA     Take 90 mg by mouth daily        EPINEPHrine 0.3 MG/0.3ML injection 2-pack    EPIPEN/ADRENACLICK/or ANY BX GENERIC EQUIV    0.6 mL    Inject 0.3 mLs (0.3 mg) into the muscle once as needed for anaphylaxis    Food allergy       fenofibrate 145 MG tablet     90 tablet    Take 1 tablet (145 mg) by mouth daily    Mixed hyperlipidemia       lamoTRIgine 25 MG tablet    LaMICtal     Take 25 mg by mouth daily        levothyroxine 75 MCG tablet    SYNTHROID/LEVOTHROID    90 tablet    Take 1 tablet (75 mcg) by mouth every morning    Myxedema heart disease (H)       lidocaine 5 % ointment    XYLOCAINE    250 g    Apply topically 3 times daily as needed for moderate pain Apply as directed    Chronic midline low back pain without sciatica       metoprolol 200 MG 24 hr tablet    TOPROL-XL     Take 200 mg by mouth daily        omeprazole 20 MG tablet      Take 20 mg by mouth daily        ondansetron 8 MG tablet    ZOFRAN    20 tablet    TAKE 1 TABLET (8 MG) BY MOUTH EVERY 8 HOURS AS NEEDED FOR NAUSEA/VOMITING.    Nausea       * order for DME     12 each    Equipment being ordered: 1 ml tuberculin syringes to be used for Vitamin B12 injections.    Vitamin B12 deficiency (non anaemic)       * order for DME     1 Units    SI-loc belt    SI (sacroiliac) joint dysfunction       * order for DME      Respironics REMSTAR 60 Series Auto CPAP 10-12 cm H2O, Wisp nasal mask w/a large cushion and a chinstrap        oxyCODONE IR 5 MG tablet    ROXICODONE    35 tablet    Take 1-2 tablets (5-10 mg) by mouth every 6 hours as needed for pain (maximum 4 tablet(s) per day)    Facet arthropathy       pregabalin 75 MG capsule    LYRICA    60 capsule    Take  1 capsule (75 mg) by mouth 2 times daily    Chronic midline low back pain without sciatica       prochlorperazine 10 MG tablet    COMPAZINE    30 tablet    Take 1 tablet (10 mg) by mouth 3 times daily as needed for nausea    Nausea       ramipril 5 MG capsule    ALTACE    90 capsule    TAKE 1 CAPSULE BY MOUTH DAILY    Essential hypertension       tiZANidine 4 MG tablet    ZANAFLEX    90 tablet    Take 1 tablet (4 mg) by mouth 3 times daily as needed for muscle spasms    Dorsalgia       VISTARIL 50 MG capsule   Generic drug:  hydrOXYzine      Take  mg by mouth 2 times daily        vitamin D3 23754 UNITS capsule    CHOLECALCIFEROL    24 capsule    Take one capsule every two weeks.    Vitamin D deficiency       * Notice:  This list has 3 medication(s) that are the same as other medications prescribed for you. Read the directions carefully, and ask your doctor or other care provider to review them with you.

## 2017-12-11 NOTE — PROGRESS NOTES
SUBJECTIVE/OBJECTIVE:                                                    Joel Pineda is a 41 year old male called for follow up visit for Medication Therapy Management.  He was referred to me from Ksenia Lyles.     Chief Complaint: Medication Review. Last visit 10/24/2017.    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 Patient finished partial hospitalization program at [facility] November 29th. Patient was diagnosed Social Phobia, Bipolar I with psychosis features. Depressive symptoms are returning not being in the program but will be starting ILP in January. Patient is seeing therapist weekly until he starts day treatment. Patient doesn't feel the ketamine is working for him. Feels like he was doing better in the hospitalization program because he was around other people and found this helped. Patient saw Dr. Davis about a week ago and sees her again early January. Patient is having dry mouth.     Pain: current therapy includes pregabalin 75mg bid. Patient takes oxycodone 5mg 1 tablet about once every 3 days or so. Patient is tolerating pain okay. Patient has been using a little more zanaflex 4mg qd-bid prn (taking about every 3 days) for some back pain; patient is no longer having jaw pain after being off of geodon. Patient thought pain increased when he started ketamine.     Hypertension: Current medications include metoprolol XL 200mg  daily.  Ketamine can increase BP so metoprolol was increased.  Patient does not self-monitor BP. Home BP monitoring in range of 120's systolic over 80's diastolic.  Patient reports no current medication side effects.    Hypothyroidism: Patient is taking levothyroxine 75 mcg daily. Patient is having the following symptoms: none. Last TSH at [facility] 11/6/2017 T4 1.12 and TSH 2.6.    PreDiabetes:  Pt currently taking diet controlled.  SMBG: never.     Patient has been off of metformin since July and would like to have his A1c checked again.       Current labs include:  BP Readings from Last 3 Encounters:   09/06/17 122/78   08/16/17 134/80   07/10/17 132/88     Today's Vitals: There were no vitals taken for this visit.  Lab Results   Component Value Date    A1C 5.5 05/16/2017   .  Lab Results   Component Value Date    CHOL 165 02/24/2017     Lab Results   Component Value Date    TRIG 326 02/24/2017     Lab Results   Component Value Date    HDL 33 02/24/2017     Lab Results   Component Value Date    LDL 67 02/24/2017       Liver Function Studies -   Recent Labs   Lab Test  01/03/17   0825   PROTTOTAL  7.2   ALBUMIN  4.2   BILITOTAL  0.3   ALKPHOS  66   AST  18   ALT  27       Lab Results   Component Value Date    UCRR 46 11/01/2016    MICROL 11 11/01/2016    UMALCR 24.56 (H) 11/01/2016       Last Basic Metabolic Panel:  Lab Results   Component Value Date     05/03/2017      Lab Results   Component Value Date    POTASSIUM 4.1 05/03/2017     Lab Results   Component Value Date    CHLORIDE 108 05/03/2017     Lab Results   Component Value Date    BUN 16 05/03/2017     Lab Results   Component Value Date    CR 1.19 05/03/2017     GFR Estimate   Date Value Ref Range Status   05/03/2017 66 >60 mL/min/1.7m2 Final     Comment:     Non  GFR Calc   01/03/2017 59 (L) >60 mL/min/1.7m2 Final     Comment:     Non  GFR Calc   11/01/2016 73 >60 mL/min/1.7m2 Final     Comment:     Non  GFR  Calc     GFR Estimate If Black   Date Value Ref Range Status   05/03/2017 80 >60 mL/min/1.7m2 Final     Comment:      GFR Calc   01/03/2017 71 >60 mL/min/1.7m2 Final     Comment:      GFR Calc   11/01/2016 88 >60 mL/min/1.7m2 Final     Comment:      GFR Calc     TSH   Date Value Ref Range Status   11/01/2016 2.76 0.40 - 4.00 mU/L Final   ]    Most Recent Immunizations   Administered Date(s) Administered     Influenza (IIV3) PF 09/09/2013     Influenza Vaccine IM 3yrs+ 4 Valent IIV4 10/11/2016     Pneumococcal (PCV 13) 10/02/2015     Pneumococcal 23 valent 10/11/2016     TD (ADULT, 7+) 10/04/2002     TDAP Vaccine (Adacel) 07/16/2010       ASSESSMENT:                                                       Current medications were reviewed today. Much of the visit spent today updating changes in patient's medication list.    Medication Adherence: no issues identified    Mental Health: stable; patient continues to work with his mental health team and has a plan in place.    Pain: stable    Hypertension: Stable. Patient is meeting BP goal of < 140/90mmHg.  Patient is due for updated microalbumin and BMP. Orders placed    Hypothyroidism: Stable. Last TSH is within normal limits.     PreDiabetes: Stable. Patient may benefit from updated A1c      PLAN:                                                      No medication changes made today.  Patient due for A1c, BMP, microalbumin.    I spent 30 minutes with this patient today. All changes were made via collaborative practice agreement with Ksenia Lyles. A copy of the visit note was provided to the patient's primary care provider.    Will follow up in 8 weeks.    The patient was sent via FreeAgent a summary of these recommendations as an after visit summary.     Radha Mcdonald, Pharm.D, BCACP  Medication Therapy Management Pharmacist

## 2018-01-04 DIAGNOSIS — I51.9 MYXEDEMA HEART DISEASE: ICD-10-CM

## 2018-01-04 DIAGNOSIS — E03.9 MYXEDEMA HEART DISEASE: ICD-10-CM

## 2018-01-05 RX ORDER — LEVOTHYROXINE SODIUM 75 UG/1
TABLET ORAL
Qty: 30 TABLET | Refills: 0 | Status: SHIPPED | OUTPATIENT
Start: 2018-01-05 | End: 2018-01-24

## 2018-01-05 NOTE — TELEPHONE ENCOUNTER
Requested Prescriptions   Pending Prescriptions Disp Refills     levothyroxine (SYNTHROID/LEVOTHROID) 75 MCG tablet [Pharmacy Med Name: LEVOTHYROXINE 75 MCG TABLET]  Last Written Prescription Date:  09/26/17  Last Fill Quantity: 90 tablet,  # refills: 0   Last Office Visit with [facility], P or [facility] prescribing provider:  09/06/17   Future Office Visit:    90 tablet 0     Sig: TAKE 1 TABLET BY MOUTH EVERY MORNING    Thyroid Protocol Failed    1/4/2018  5:42 PM       Failed - Normal TSH on file in past 12 months    Recent Labs   Lab Test  11/01/16   1546   TSH  2.76             Passed - Patient is 12 years or older       Passed - Recent or future visit with authorizing provider's specialty    Patient had office visit in the last year or has a visit in the next 30 days with authorizing provider.  See chart review.

## 2018-01-05 NOTE — TELEPHONE ENCOUNTER
Medication is being filled for 1 time refill only due to:  Patient needs to be seen for fasting lab appointment and appointment with the provider for further refills.  send letter.  Keyana Ornelas RN

## 2018-01-11 DIAGNOSIS — R11.0 NAUSEA: ICD-10-CM

## 2018-01-11 NOTE — TELEPHONE ENCOUNTER
"Requested Prescriptions   Pending Prescriptions Disp Refills     ondansetron (ZOFRAN) 8 MG tablet [Pharmacy Med Name: ONDANSETRON HCL 8 MG TABLET] 20 tablet 5     Sig: TAKE 1 TABLET (8 MG) BY MOUTH EVERY 8 HOURS AS NEEDED FOR NAUSEA/VOMITING.     Antivertigo/Antiemetic Agents Passed    1/11/2018  1:39 PM       Passed - Recent or future visit with authorizing provider's specialty    Patient had office visit in the last year or has a visit in the next 30 days with authorizing provider.  See \"Patient Info\" tab in inbasket, or \"Choose Columns\" in Meds & Orders section of the refill encounter.              Passed - Patient is 18 years of age or older        ondansetron (ZOFRAN) 8 MG tablet  Last Written Prescription Date:  1/2/17  Last Fill Quantity: 20,  # refills: 5   Last Office Visit with [facility], P or [facility] prescribing provider:  9/6/17   Future Office Visit:       "

## 2018-01-12 RX ORDER — ONDANSETRON 8 MG/1
TABLET, FILM COATED ORAL
Qty: 20 TABLET | Refills: 5 | Status: SHIPPED | OUTPATIENT
Start: 2018-01-12 | End: 2018-05-01

## 2018-01-16 ENCOUNTER — TELEPHONE (OUTPATIENT)
Dept: PALLIATIVE MEDICINE | Facility: CLINIC | Age: 43
End: 2018-01-16

## 2018-01-16 NOTE — TELEPHONE ENCOUNTER
Outreach X1. Left a VM for patient letting him know that we do not do injections for TMJ. Provided nurse line phone number.    ISHA Garcia, RN  Care Coordinator  [facility]

## 2018-01-17 PROBLEM — G89.4 CHRONIC PAIN SYNDROME: Status: ACTIVE | Noted: 2017-04-04

## 2018-01-17 PROBLEM — G89.4 CHRONIC PAIN SYNDROME: Chronic | Status: ACTIVE | Noted: 2017-04-04

## 2018-01-18 DIAGNOSIS — I10 ESSENTIAL HYPERTENSION WITH GOAL BLOOD PRESSURE LESS THAN 140/90: ICD-10-CM

## 2018-01-18 DIAGNOSIS — E03.9 ACQUIRED HYPOTHYROIDISM: Chronic | ICD-10-CM

## 2018-01-18 DIAGNOSIS — R73.03 PREDIABETES: ICD-10-CM

## 2018-01-18 DIAGNOSIS — E53.8 B12 DEFICIENCY: ICD-10-CM

## 2018-01-18 LAB
ANION GAP SERPL CALCULATED.3IONS-SCNC: 10 MMOL/L (ref 3–14)
BASOPHILS # BLD AUTO: 0.1 10E9/L (ref 0–0.2)
BASOPHILS NFR BLD AUTO: 0.9 %
BUN SERPL-MCNC: 18 MG/DL (ref 7–30)
CALCIUM SERPL-MCNC: 9.4 MG/DL (ref 8.5–10.1)
CHLORIDE SERPL-SCNC: 102 MMOL/L (ref 94–109)
CHOLEST SERPL-MCNC: 178 MG/DL
CO2 SERPL-SCNC: 26 MMOL/L (ref 20–32)
CREAT SERPL-MCNC: 1.1 MG/DL (ref 0.66–1.25)
CREAT UR-MCNC: 58 MG/DL
DIFFERENTIAL METHOD BLD: NORMAL
EOSINOPHIL # BLD AUTO: 0.2 10E9/L (ref 0–0.7)
EOSINOPHIL NFR BLD AUTO: 2.4 %
ERYTHROCYTE [DISTWIDTH] IN BLOOD BY AUTOMATED COUNT: 13.6 % (ref 10–15)
GFR SERPL CREATININE-BSD FRML MDRD: 73 ML/MIN/1.7M2
GLUCOSE SERPL-MCNC: 106 MG/DL (ref 70–99)
HBA1C MFR BLD: 5.8 % (ref 4.3–6)
HCT VFR BLD AUTO: 45.8 % (ref 40–53)
HDLC SERPL-MCNC: 31 MG/DL
HGB BLD-MCNC: 15.2 G/DL (ref 13.3–17.7)
LDLC SERPL CALC-MCNC: 83 MG/DL
LYMPHOCYTES # BLD AUTO: 3.3 10E9/L (ref 0.8–5.3)
LYMPHOCYTES NFR BLD AUTO: 38.8 %
MCH RBC QN AUTO: 29.1 PG (ref 26.5–33)
MCHC RBC AUTO-ENTMCNC: 33.2 G/DL (ref 31.5–36.5)
MCV RBC AUTO: 88 FL (ref 78–100)
MICROALBUMIN UR-MCNC: <5 MG/L
MICROALBUMIN/CREAT UR: NORMAL MG/G CR (ref 0–17)
MONOCYTES # BLD AUTO: 0.8 10E9/L (ref 0–1.3)
MONOCYTES NFR BLD AUTO: 9.5 %
NEUTROPHILS # BLD AUTO: 4.1 10E9/L (ref 1.6–8.3)
NEUTROPHILS NFR BLD AUTO: 48.4 %
NONHDLC SERPL-MCNC: 147 MG/DL
PLATELET # BLD AUTO: 330 10E9/L (ref 150–450)
POTASSIUM SERPL-SCNC: 3.9 MMOL/L (ref 3.4–5.3)
RBC # BLD AUTO: 5.23 10E12/L (ref 4.4–5.9)
SODIUM SERPL-SCNC: 138 MMOL/L (ref 133–144)
TRIGL SERPL-MCNC: 319 MG/DL
TSH SERPL DL<=0.005 MIU/L-ACNC: 1.84 MU/L (ref 0.4–4)
WBC # BLD AUTO: 8.5 10E9/L (ref 4–11)

## 2018-01-18 PROCEDURE — 83036 HEMOGLOBIN GLYCOSYLATED A1C: CPT | Performed by: FAMILY MEDICINE

## 2018-01-18 PROCEDURE — 80048 BASIC METABOLIC PNL TOTAL CA: CPT | Performed by: FAMILY MEDICINE

## 2018-01-18 PROCEDURE — 36415 COLL VENOUS BLD VENIPUNCTURE: CPT | Performed by: FAMILY MEDICINE

## 2018-01-18 PROCEDURE — 85025 COMPLETE CBC W/AUTO DIFF WBC: CPT | Performed by: FAMILY MEDICINE

## 2018-01-18 PROCEDURE — 82043 UR ALBUMIN QUANTITATIVE: CPT | Performed by: FAMILY MEDICINE

## 2018-01-18 PROCEDURE — 80061 LIPID PANEL: CPT | Performed by: FAMILY MEDICINE

## 2018-01-18 PROCEDURE — 84443 ASSAY THYROID STIM HORMONE: CPT | Performed by: FAMILY MEDICINE

## 2018-01-24 ENCOUNTER — OFFICE VISIT (OUTPATIENT)
Dept: FAMILY MEDICINE | Facility: CLINIC | Age: 43
End: 2018-01-24
Payer: COMMERCIAL

## 2018-01-24 VITALS
HEART RATE: 88 BPM | HEIGHT: 77 IN | TEMPERATURE: 97.9 F | DIASTOLIC BLOOD PRESSURE: 78 MMHG | SYSTOLIC BLOOD PRESSURE: 116 MMHG | RESPIRATION RATE: 20 BRPM | WEIGHT: 311.5 LBS | BODY MASS INDEX: 36.78 KG/M2

## 2018-01-24 DIAGNOSIS — R73.02 IMPAIRED GLUCOSE TOLERANCE: ICD-10-CM

## 2018-01-24 DIAGNOSIS — E03.9 ACQUIRED HYPOTHYROIDISM: ICD-10-CM

## 2018-01-24 DIAGNOSIS — E78.2 MIXED HYPERLIPIDEMIA: ICD-10-CM

## 2018-01-24 DIAGNOSIS — M47.819 FACET ARTHROPATHY: ICD-10-CM

## 2018-01-24 DIAGNOSIS — G89.29 CHRONIC MIDLINE LOW BACK PAIN WITHOUT SCIATICA: ICD-10-CM

## 2018-01-24 DIAGNOSIS — M26.609 TMJ (TEMPOROMANDIBULAR JOINT SYNDROME): ICD-10-CM

## 2018-01-24 DIAGNOSIS — M54.50 CHRONIC MIDLINE LOW BACK PAIN WITHOUT SCIATICA: ICD-10-CM

## 2018-01-24 DIAGNOSIS — I10 HYPERTENSION GOAL BP (BLOOD PRESSURE) < 140/90: Primary | Chronic | ICD-10-CM

## 2018-01-24 PROCEDURE — 99214 OFFICE O/P EST MOD 30 MIN: CPT | Performed by: FAMILY MEDICINE

## 2018-01-24 RX ORDER — LEVOTHYROXINE SODIUM 75 UG/1
75 TABLET ORAL EVERY MORNING
Qty: 90 TABLET | Refills: 1 | Status: SHIPPED | OUTPATIENT
Start: 2018-01-24 | End: 2018-07-30

## 2018-01-24 RX ORDER — RAMIPRIL 5 MG/1
CAPSULE ORAL
Qty: 90 CAPSULE | Refills: 1 | Status: SHIPPED | OUTPATIENT
Start: 2018-01-24 | End: 2018-08-19

## 2018-01-24 RX ORDER — RISPERIDONE 1 MG/1
TABLET ORAL
COMMUNITY
Start: 2018-01-04 | End: 2018-03-12

## 2018-01-24 RX ORDER — OXYCODONE HYDROCHLORIDE 5 MG/1
5-10 TABLET ORAL EVERY 6 HOURS PRN
Qty: 35 TABLET | Refills: 0 | Status: SHIPPED | OUTPATIENT
Start: 2018-01-24 | End: 2018-02-27

## 2018-01-24 RX ORDER — CELECOXIB 200 MG/1
CAPSULE ORAL
Qty: 180 CAPSULE | Refills: 1 | Status: SHIPPED | OUTPATIENT
Start: 2018-01-24 | End: 2018-12-12

## 2018-01-24 RX ORDER — FENOFIBRATE 145 MG/1
145 TABLET, COATED ORAL DAILY
Qty: 90 TABLET | Refills: 1 | Status: SHIPPED | OUTPATIENT
Start: 2018-01-24 | End: 2018-05-01

## 2018-01-24 RX ORDER — LOPERAMIDE HCL 2 MG
2 CAPSULE ORAL 4 TIMES DAILY PRN
COMMUNITY
End: 2020-03-31

## 2018-01-24 RX ORDER — ATORVASTATIN CALCIUM 40 MG/1
TABLET, FILM COATED ORAL
Qty: 90 TABLET | Refills: 1 | Status: SHIPPED | OUTPATIENT
Start: 2018-01-24 | End: 2018-07-22

## 2018-01-24 NOTE — PROGRESS NOTES
"  SUBJECTIVE:   Joel Pineda is a 42 year old male who presents to clinic today for the following health issues:      Follow up LAbs    Would like referral for P/T for TMJ disfunction    SUBJECTIVE:  Here today primarily in follow-up of hypertension, lipids, glucose, hypothyroid.  We reviewed recent lab work, all within normal limits other than some elevated triglycerides.  Take medications as prescribed without side effects.  Generally feeling okay.  Rare intermittent use of oxycodone for back trouble.  Has been having more and more trouble with TMJ symptoms.  Has worked with his dentist and his sleep specialist on proper face gear.  They are suggesting a referral for physical therapy for his TMJ.  Has a lot of pain and clicking laterally    Review of systems otherwise negative.  Past medical, family, and social history reviewed and updated in chart.    OBJECTIVE:  /78 (BP Location: Right arm, Patient Position: Sitting, Cuff Size: Adult Large)  Pulse 88  Temp 97.9  F (36.6  C) (Oral)  Resp 20  Ht 1.956 m (6' 5\")  Wt (!) 141.3 kg (311 lb 8 oz)  BMI 36.94 kg/m2  Alert, pleasant, upbeat, and in no apparent discomfort.  Ears normal. Throat and pharynx normal. Neck supple. No adenopathy or masses in the neck or supraclavicular regions. Sinuses non tender.  S1 and S2 normal, no murmurs, clicks, gallops or rubs. Regular rate and rhythm. Chest is clear; no wheezes or rales. No edema or JVD.  Past labs reviewed with the patient.     ASSESSMENT / PLAN:  (I10) Hypertension goal BP (blood pressure) < 140/90  (primary encounter diagnosis)  Comment: Doing well on current dosage of medications.  Refilled  Plan: ramipril (ALTACE) 5 MG capsule            (E78.2) Mixed hyperlipidemia  Comment: Continuing to monitor.  Continue same dosage  Plan: fenofibrate 145 MG tablet, atorvastatin         (LIPITOR) 40 MG tablet            (R73.02) Impaired glucose tolerance  Comment: Off metformin at this point and we will continue " to follow  Plan:     (E03.9) Acquired hypothyroidism  Comment: Stable.  Continue same dosage  Plan: levothyroxine (SYNTHROID/LEVOTHROID) 75 MCG         tablet            (M54.5,  G89.29) Chronic midline low back pain without sciatica  Comment:   Plan: celecoxib (CELEBREX) 200 MG capsule            (M12.88) Facet arthropathy  Comment:   Plan: oxyCODONE IR (ROXICODONE) 5 MG tablet            (M26.609) TMJ (temporomandibular joint syndrome)  Comment:   Plan: PHYSICAL THERAPY REFERRAL            Follow up 6 months  JA Lyles MD    (Chart documentation completed in part with Dragon voice-recognition software.  Even though reviewed some grammatical, spelling, and word errors may remain.)

## 2018-01-24 NOTE — NURSING NOTE
"Chief Complaint   Patient presents with     RECHECK       Initial /78 (BP Location: Right arm, Patient Position: Sitting, Cuff Size: Adult Large)  Pulse 88  Temp 97.9  F (36.6  C) (Oral)  Resp 20  Ht 1.956 m (6' 5\")  Wt (!) 141.3 kg (311 lb 8 oz)  BMI 36.94 kg/m2 Estimated body mass index is 36.94 kg/(m^2) as calculated from the following:    Height as of this encounter: 1.956 m (6' 5\").    Weight as of this encounter: 141.3 kg (311 lb 8 oz).  Medication Reconciliation: russell Wright        "

## 2018-01-24 NOTE — MR AVS SNAPSHOT
"              After Visit Summary   1/24/2018    Joel Pineda    MRN: [id number]           Patient Information     Date Of Birth          1975        Visit Information        Provider Department      1/24/2018 2:20 PM Ksenia Lyles MD [facility]        Today's Diagnoses     Hypertension goal BP (blood pressure) < 140/90    -  1    Mixed hyperlipidemia        Impaired glucose tolerance        Acquired hypothyroidism        Chronic midline low back pain without sciatica        Facet arthropathy        TMJ (temporomandibular joint syndrome)           Follow-ups after your visit        Additional Services     PHYSICAL THERAPY REFERRAL       *This therapy referral will be filtered to a centralized scheduling office at [facility] and the patient will receive a call to schedule an appointment at a [place] location most convenient for them. *     [facility] provides Physical Therapy evaluation and treatment and many specialty services across the [place] system.  If requesting a specialty program, please choose from the list below.    If you have not heard from the scheduling office within 2 business days, please call [phone] for all locations, with the exception of [place], please call [phone].  Treatment: Evaluation & Treatment  Special Instructions/Modalities: TMJ physical therapy -per patient choice  Special Programs:     Please be aware that coverage of these services is subject to the terms and limitations of your health insurance plan.  Call member services at your health plan with any benefit or coverage questions.      **Note to Provider:  If you are referring outside of [place] for the therapy appointment, please list the name of the location in the \"special instructions\" above, print the referral and give to the patient to schedule the appointment.                  Follow-up notes from your care team     Return in about 6 months " (around 7/24/2018).      Your next 10 appointments already scheduled     Jan 29, 2018  3:15 PM CST   Radiology Injections with Elaine Diaz MD   [facility] [place] ([facility])    [address]  [place] [id number] [phone]            Feb 05, 2018  3:00 PM CST   TELEMEDICINE with Radha Mcdonald [facility] [place] ([facility])    [address] [place] [address]  [address] [place] [id number] [phone]           Note: this is not an onsite visit; there is no need to come to the facility.              Who to contact     If you have questions or need follow up information about today's clinic visit or your schedule please contact [facility] directly at [phone].  Normal or non-critical lab and imaging results will be communicated to you by MyChart, letter or phone within 4 business days after the clinic has received the results. If you do not hear from us within 7 days, please contact the clinic through [facility] or phone. If you have a critical or abnormal lab result, we will notify you by phone as soon as possible.  Submit refill requests through [facility] or call your pharmacy and they will forward the refill request to us. Please allow 3 business days for your refill to be completed.          Additional Information About Your Visit        [facility] Information     [facility] gives you secure access to your electronic health record. If you see a primary care provider, you can also send messages to your care team and make appointments. If you have questions, please call your primary care clinic.  If you do not have a primary care provider, please call [phone] and they will assist you.        Care EveryWhere ID     This is your Care EveryWhere ID. This could be used by other organizations to access your [facility] medical records  [id number]        Your Vitals Were     Pulse Temperature Respirations  "Height BMI (Body Mass Index)       88 97.9  F (36.6  C) (Oral) 20 1.956 m (6' 5\") 36.94 kg/m2        Blood Pressure from Last 3 Encounters:   01/24/18 116/78   09/06/17 122/78   08/16/17 134/80    Weight from Last 3 Encounters:   01/24/18 (!) 141.3 kg (311 lb 8 oz)   09/06/17 (!) 140.9 kg (310 lb 11.2 oz)   08/16/17 (!) 142.8 kg (314 lb 12.8 oz)              We Performed the Following     PHYSICAL THERAPY REFERRAL          Today's Medication Changes          These changes are accurate as of 1/24/18  2:39 PM.  If you have any questions, ask your nurse or doctor.               These medicines have changed or have updated prescriptions.        Dose/Directions    atorvastatin 40 MG tablet   Commonly known as:  LIPITOR   This may have changed:  See the new instructions.   Used for:  Mixed hyperlipidemia   Changed by:  Ksenia Lyles MD        TAKE 1 TABLET (40 MG) BY MOUTH DAILY   Quantity:  90 tablet   Refills:  1       celecoxib 200 MG capsule   Commonly known as:  celeBREX   This may have changed:  See the new instructions.   Used for:  Chronic midline low back pain without sciatica   Changed by:  Ksenia Lyles MD        TAKE 1 CAPSULE BY MOUTH TWICE DAILY AS NEEDED FORMODERATE PAIN   Quantity:  180 capsule   Refills:  1       cetirizine 10 MG tablet   Commonly known as:  zyrTEC   This may have changed:  when to take this   Used for:  Cough        Dose:  10 mg   Take 1 tablet (10 mg) by mouth At Bedtime   Quantity:  30 tablet   Refills:  11       levothyroxine 75 MCG tablet   Commonly known as:  SYNTHROID/LEVOTHROID   This may have changed:  See the new instructions.   Used for:  Acquired hypothyroidism   Changed by:  Ksenia Lyles MD        Dose:  75 mcg   Take 1 tablet (75 mcg) by mouth every morning   Quantity:  90 tablet   Refills:  1       ramipril 5 MG capsule   Commonly known as:  ALTACE   This may have changed:  See the new instructions.   Used for:  Hypertension goal BP (blood " pressure) < 140/90   Changed by:  Ksenia Lyles MD        TAKE 1 CAPSULE BY MOUTH DAILY   Quantity:  90 capsule   Refills:  1            Where to get your medicines      These medications were sent to [id number] - [place], [place] - [address]  [address], [facility] [address]     Phone:  [phone]     atorvastatin 40 MG tablet    celecoxib 200 MG capsule    fenofibrate 145 MG tablet    levothyroxine 75 MCG tablet    ramipril 5 MG capsule         Some of these will need a paper prescription and others can be bought over the counter.  Ask your nurse if you have questions.     Bring a paper prescription for each of these medications     oxyCODONE IR 5 MG tablet                Primary Care Provider Office Phone # Fax #    Ksenia Lyles [phone] [address] [facility] [address]        Goals        General    I will continue to attend Psychiatry appointments for medication management, 1/14/2014 (pt-stated)     Notes - Note edited  8/24/2016  4:04 PM by Dalila Cavazos LSW    As of today's date 8/24/2016 goal is met at 51 - 75%.   Goal Status:  Ongoing Sees  Therapist every other week and Psych PA every 3 weeks.  As of today's date 5/25/2016 goal is met at 51 - 75%.   Goal Status:  Showing progress-  Meeting with Psych PA for second time tomorrow  To review meds As of today's date 4/11/2016 goal is met at 51 - 75%.   Goal Status:  Showing progress  As of today's date 1/14/2014 goal is met at 51 - 75%.   Goal Status:  Active        I will schedule therapy with new counselor, 1/14/2014 (pt-stated)     Notes - Note edited  5/25/2016  1:54 PM by Dalila Cavazos LSW    As of today's date 5/25/2016 goal is met at 76 - 100%.   Goal Status:  Ongoing New therapist and Psych PA on a regular basis  As of today's date 5/10/2016 goal is met at 76 - 100%.   Goal Status:  Ongoing met with new therapist at [facility]  As of today's date  4/11/2016 goal is met at 51 - 75%.   Goal Status:  Ongoing meets with therapist regularly  As of today's date 1/14/2014 goal is met at 0 - 25%.   Goal Status:  Active        Psychosocial I need some help with cleaning (pt-stated)     Notes - Note edited  6/23/2016  1:57 PM by Dalila Cavazos LSW    As of today's date 6/23/2016 goal is met at 26 - 50%.   Goal Status:  Showing progress met with [facility]. Has not yet made a decision  As of today's date 5/25/2016 goal is met at 0 - 25%.   Goal Status:  Ongoing agency had to reschedule appt.  As of today's date 5/10/2016 goal is met at 0 - 25%.   Goal Status:  Active referral to [facility]        Equal Access to Services     ANNA CANO : Hadii floyd Darby, wataisha lukeyuradaha, qaybta kaalmada adejenny, isabell echevarria. So [facility] [phone].    ATENCIÓN: Si habla español, tiene a faustin disposición servicios gratuitos de asistencia lingüística. Llame al [phone].    We comply with applicable federal civil rights laws and [place] laws. We do not discriminate on the basis of race, color, national origin, age, disability, sex, sexual orientation, or gender identity.            Thank you!     Thank you for choosing [facility]  for your care. Our goal is always to provide you with excellent care. Hearing back from our patients is one way we can continue to improve our services. Please take a few minutes to complete the written survey that you may receive in the mail after your visit with us. Thank you!             Your Updated Medication List - Protect others around you: Learn how to safely use, store and throw away your medicines at www.disposemymeds.org.          This list is accurate as of 1/24/18  2:39 PM.  Always use your most recent med list.                   Brand Name Dispense Instructions for use Diagnosis    acetaminophen 500 MG Caps     60 capsule    Take 500 mg by mouth every 4 hours as needed         albuterol 108 (90 BASE) MCG/ACT Inhaler    PROAIR HFA/PROVENTIL HFA/VENTOLIN HFA     Inhale 2 puffs into the lungs every 4 hours as needed        ALPRAZolam 1 MG 24 hr tablet    XANAX XR     Take 1 mg by mouth every morning        atorvastatin 40 MG tablet    LIPITOR    90 tablet    TAKE 1 TABLET (40 MG) BY MOUTH DAILY    Mixed hyperlipidemia       benztropine 1 MG tablet    COGENTIN     Take 1 mg by mouth 2 times daily Tasking in PM        celecoxib 200 MG capsule    celeBREX    180 capsule    TAKE 1 CAPSULE BY MOUTH TWICE DAILY AS NEEDED FORMODERATE PAIN    Chronic midline low back pain without sciatica       cetirizine 10 MG tablet    zyrTEC    30 tablet    Take 1 tablet (10 mg) by mouth At Bedtime    Cough       cyanocobalamin 1000 MCG/ML injection    VITAMIN B12    10 mL    Inject 1 mL (1,000 mcg) into the muscle every 30 days    B12 deficiency       dicyclomine 20 MG tablet    BENTYL    120 tablet    Take 1 tablet (20 mg) by mouth 4 times daily as needed    Irritable bowel syndrome with diarrhea       DULoxetine 30 MG EC capsule    CYMBALTA     Take 90 mg by mouth daily        EPINEPHrine 0.3 MG/0.3ML injection 2-pack    EPIPEN/ADRENACLICK/or ANY BX GENERIC EQUIV    0.6 mL    Inject 0.3 mLs (0.3 mg) into the muscle once as needed for anaphylaxis    Food allergy       fenofibrate 145 MG tablet     90 tablet    Take 1 tablet (145 mg) by mouth daily    Mixed hyperlipidemia       lamoTRIgine 25 MG tablet    LaMICtal     Take 100 mg by mouth daily 200mg daily        levothyroxine 75 MCG tablet    SYNTHROID/LEVOTHROID    90 tablet    Take 1 tablet (75 mcg) by mouth every morning    Acquired hypothyroidism       lidocaine 5 % ointment    XYLOCAINE    250 g    Apply topically 3 times daily as needed for moderate pain Apply as directed    Chronic midline low back pain without sciatica       loperamide 2 MG capsule    IMODIUM     Take 2 mg by mouth 4 times daily as needed for diarrhea        metoprolol succinate 200 MG  24 hr tablet    TOPROL-XL     Take 200 mg by mouth daily        omeprazole 20 MG tablet      Take 20 mg by mouth daily        ondansetron 8 MG tablet    ZOFRAN    20 tablet    TAKE 1 TABLET (8 MG) BY MOUTH EVERY 8 HOURS AS NEEDED FOR NAUSEA/VOMITING.    Nausea       * order for DME     12 each    Equipment being ordered: 1 ml tuberculin syringes to be used for Vitamin B12 injections.    Vitamin B12 deficiency (non anaemic)       * order for DME     1 Units    SI-loc belt    SI (sacroiliac) joint dysfunction       * order for DME      Respironics REMSTAR 60 Series Auto CPAP 10-12 cm H2O, Wisp nasal mask w/a large cushion and a chinstrap        oxyCODONE IR 5 MG tablet    ROXICODONE    35 tablet    Take 1-2 tablets (5-10 mg) by mouth every 6 hours as needed for pain (maximum 4 tablet(s) per day)    Facet arthropathy       pregabalin 75 MG capsule    LYRICA    60 capsule    Take 1 capsule (75 mg) by mouth 2 times daily    Chronic midline low back pain without sciatica       ramipril 5 MG capsule    ALTACE    90 capsule    TAKE 1 CAPSULE BY MOUTH DAILY    Hypertension goal BP (blood pressure) < 140/90       risperiDONE 1 MG tablet    risperDAL          tiZANidine 4 MG tablet    ZANAFLEX    90 tablet    Take 1 tablet (4 mg) by mouth 3 times daily as needed for muscle spasms    Dorsalgia       VISTARIL 50 MG capsule   Generic drug:  hydrOXYzine      Take  mg by mouth 2 times daily        vitamin D3 15444 UNITS capsule    CHOLECALCIFEROL    24 capsule    Take one capsule every two weeks.    Vitamin D deficiency       * Notice:  This list has 3 medication(s) that are the same as other medications prescribed for you. Read the directions carefully, and ask your doctor or other care provider to review them with you.

## 2018-01-29 ASSESSMENT — ANXIETY QUESTIONNAIRES
5. BEING SO RESTLESS THAT IT IS HARD TO SIT STILL: SEVERAL DAYS
6. BECOMING EASILY ANNOYED OR IRRITABLE: MORE THAN HALF THE DAYS
IF YOU CHECKED OFF ANY PROBLEMS ON THIS QUESTIONNAIRE, HOW DIFFICULT HAVE THESE PROBLEMS MADE IT FOR YOU TO DO YOUR WORK, TAKE CARE OF THINGS AT HOME, OR GET ALONG WITH OTHER PEOPLE: VERY DIFFICULT
GAD7 TOTAL SCORE: 14
2. NOT BEING ABLE TO STOP OR CONTROL WORRYING: NEARLY EVERY DAY
3. WORRYING TOO MUCH ABOUT DIFFERENT THINGS: NEARLY EVERY DAY
1. FEELING NERVOUS, ANXIOUS, OR ON EDGE: MORE THAN HALF THE DAYS
7. FEELING AFRAID AS IF SOMETHING AWFUL MIGHT HAPPEN: MORE THAN HALF THE DAYS

## 2018-01-29 ASSESSMENT — PATIENT HEALTH QUESTIONNAIRE - PHQ9: 5. POOR APPETITE OR OVEREATING: SEVERAL DAYS

## 2018-01-30 ENCOUNTER — THERAPY VISIT (OUTPATIENT)
Dept: PHYSICAL THERAPY | Facility: CLINIC | Age: 43
End: 2018-01-30
Payer: COMMERCIAL

## 2018-01-30 DIAGNOSIS — M26.609 TEMPORAL MANDIBULAR JOINT DISORDER: Primary | ICD-10-CM

## 2018-01-30 PROCEDURE — 97161 PT EVAL LOW COMPLEX 20 MIN: CPT | Mod: GP | Performed by: PHYSICAL THERAPIST

## 2018-01-30 PROCEDURE — 97140 MANUAL THERAPY 1/> REGIONS: CPT | Mod: GP | Performed by: PHYSICAL THERAPIST

## 2018-01-30 PROCEDURE — 97530 THERAPEUTIC ACTIVITIES: CPT | Mod: GP | Performed by: PHYSICAL THERAPIST

## 2018-01-30 PROCEDURE — 97110 THERAPEUTIC EXERCISES: CPT | Mod: GP | Performed by: PHYSICAL THERAPIST

## 2018-01-30 ASSESSMENT — ANXIETY QUESTIONNAIRES: GAD7 TOTAL SCORE: 14

## 2018-01-30 ASSESSMENT — ASTHMA QUESTIONNAIRES: ACT_TOTALSCORE: 25

## 2018-01-30 ASSESSMENT — PATIENT HEALTH QUESTIONNAIRE - PHQ9: SUM OF ALL RESPONSES TO PHQ QUESTIONS 1-9: 14

## 2018-01-30 NOTE — PROGRESS NOTES
[facility] Initial Evaluation  Subjective:  Patient is a 42 year old male presenting with rehab tmj hpi.   Joel Pineda is a 42 year old male with a TMJ bilateral condition.  Occurance: using dental appliance.  Condition occurred: for unknown reasons and at home.  This is a chronic condition  Patient reports a long history of bilateral jaw pain >15 years. Patient reports a recent worsening of jaw pain following an adjustment to his oral appliance (for sleep apnea). Patient reports protracting his lower jaw with the device beyond 2 deg caused more pain. Patient also reports taking an anti psychotic medication which causes him to clench his teeth.    Patient reports pain:  TMJ left and TMJ right.  Radiates to:  Ear.  Pain is described as aching and is constant and reported as 6/10.  Associated symptoms:  Ear ache, ear plugging and joint noise. Pain is worse during the day.  Symptoms are exacerbated by yawning, stress, chewing and clenching and relieved by rest and heat.  Since onset symptoms are unchanged.  Special tests:  X-ray (at dentist. unremarkable per patient.).      General health as reported by patient is good.                      Red flags:  None as reported by the patient.                        Objective:  Standing Alignment:    Cervical/Thoracic:  Forward head  Shoulder/UE:  Rounded shoulders                                                          TMJ Evaluation  ROM:   AROM Cervical: normal          AROM TMJ:  Openin mm     Left Laterotrusion:  6mm    Right Laterotrusion:  7mm    Protrusion: 4mm        Associated Findings: Headaches:  None  Parafunctional Habits:    Bruxism:  At night. uses mouth guard    Chewing/Biting Nails:  NA    Clenching:  During day time due to medication side effect    Caffeine:  24-32 oz in the morning. Stops drinking after 12    Aerobic Exercise:  Occasional  Sleep Quality:  Fair  Neurological:  not assessed      Previous Interventions:     Intraoral Appliances:  Mouth guard, CPAP. no longer uses  mandublar protractor for snoring at night time    Palpation:    Left side tenderness present at:  Upper Trap and Erector Spinae  Right side tenderness present at:  Upper Trap; Erector Spinae; Deep Masseter; Temporalis and Lateral Pterygoid    Movement Characteristics:    Click:  None observed during todays appointment, but patient reports this occurs occasionally  Crepitus:  None  Deviations:  None observed during todays appointment, but patient reports this occurs occasionally  Subluxation:  None  Early Translation:  None    TMJ Findings:    Tongue Positioning:  Floor                          General     ROS    Assessment/Plan:    Patient is a 42 year old male with both sides TMJ complaints.    Patient has the following significant findings with corresponding treatment plan.                Diagnosis 1:  TMJD  Pain -  hot/cold therapy, US, manual therapy, self management, education and home program  Decreased function - therapeutic activities and home program  Impaired posture - neuro re-education, therapeutic activities and home program    Therapy Evaluation Codes:   1) History comprised of:   Personal factors that impact the plan of care:      Anxiety.    Comorbidity factors that impact the plan of care are:      Depression, Mental illness and None.     Medications impacting care: antipsychotic medication - clenching per pateint report.  2) Examination of Body Systems comprised of:   Body structures and functions that impact the plan of care:      TMJ.   Activity limitations that impact the plan of care are:      opening, yawning, chewing.  3) Clinical presentation characteristics are:   Stable/Uncomplicated.  4) Decision-Making    Low complexity using standardized patient assessment instrument and/or measureable assessment of functional outcome.  Cumulative Therapy Evaluation is: Low complexity.    Previous and current functional limitations:  (See Goal  Flow Sheet for this information)    Short term and Long term goals: (See Goal Flow Sheet for this information)     Communication ability:  Patient appears to be able to clearly communicate and understand verbal and written communication and follow directions correctly.  Treatment Explanation - The following has been discussed with the patient:   RX ordered/plan of care  Anticipated outcomes  Possible risks and side effects  This patient would benefit from PT intervention to resume normal activities.   Rehab potential is good.    Frequency:  1 X week, once daily  Duration:  for 6 weeks  Discharge Plan:  Achieve all LTG.  Independent in home treatment program.  Reach maximal therapeutic benefit.    Please refer to the daily flowsheet for treatment today, total treatment time and time spent performing 1:1 timed codes.

## 2018-01-30 NOTE — PROGRESS NOTES
[facility] Initial Evaluation  Subjective:  Patient is a 42 year old male presenting with rehab left ankle/foot hpi.                                      Pertinent medical history includes:  Overweight, high blood pressure, mental illness, depression, thyroid problems, sleep disorder/apnea and other.  Medical allergies: yes.  Other surgeries include:  None reported.  Current medications:  Thyroid medication, sleep medication, anti-inflammatory, pain medication, muscle relaxants, anti-depressants and high blood pressure medication.                                      Objective:  System    Physical Exam    General     ROS    Assessment/Plan:

## 2018-02-02 ENCOUNTER — CARE COORDINATION (OUTPATIENT)
Dept: CARE COORDINATION | Facility: CLINIC | Age: 43
End: 2018-02-02

## 2018-02-02 NOTE — PROGRESS NOTES
2/2/2018 Clinic Care Coordination Contact / Social Work   Received a call from Pt. He has not been open to Care Coordination since April of 2017.  He has been participating in an intensive out -pt Mental  Health Program through [facility] since late fall. Currently he is attending 3 hours a day , 4 days a week. Someone suggested he arrange for an .ILS (Independent Living skills worker).  That is usually arranged through the [facility] if on MA.  Pt does not qualify for [facility] services due to savings and income.   This writer provided him with some resources to contact to see if he can qualify for this service on a private pay  basis. Pt.had no other questions or concerns at this time.    Plan : Pt will continue to work with out-pt. MH program and contact resources provided. He will call SW with any future needs.   [facility] plans no further outreach at this time.    Juliette Cavazos [facility]  , Clinic Care Coordination  Clinics:  [facility] [place], [place]  [phone]   2/2/2018   1:33 PM

## 2018-02-05 ENCOUNTER — ALLIED HEALTH/NURSE VISIT (OUTPATIENT)
Dept: PHARMACY | Facility: CLINIC | Age: 43
End: 2018-02-05
Payer: COMMERCIAL

## 2018-02-05 ENCOUNTER — TELEPHONE (OUTPATIENT)
Dept: FAMILY MEDICINE | Facility: CLINIC | Age: 43
End: 2018-02-05

## 2018-02-05 DIAGNOSIS — G89.4 CHRONIC PAIN SYNDROME: ICD-10-CM

## 2018-02-05 DIAGNOSIS — R19.7 DIARRHEA, UNSPECIFIED TYPE: ICD-10-CM

## 2018-02-05 DIAGNOSIS — E03.9 HYPOTHYROIDISM, UNSPECIFIED TYPE: ICD-10-CM

## 2018-02-05 DIAGNOSIS — F31.81 BIPOLAR 2 DISORDER (H): ICD-10-CM

## 2018-02-05 DIAGNOSIS — E63.9 NUTRITIONAL DEFICIENCY: ICD-10-CM

## 2018-02-05 DIAGNOSIS — E78.5 HYPERLIPIDEMIA LDL GOAL <130: ICD-10-CM

## 2018-02-05 DIAGNOSIS — K58.0 IRRITABLE BOWEL SYNDROME WITH DIARRHEA: ICD-10-CM

## 2018-02-05 DIAGNOSIS — I10 ESSENTIAL HYPERTENSION WITH GOAL BLOOD PRESSURE LESS THAN 140/90: ICD-10-CM

## 2018-02-05 DIAGNOSIS — G47.00 INSOMNIA, UNSPECIFIED TYPE: ICD-10-CM

## 2018-02-05 DIAGNOSIS — J31.0 CHRONIC NONALLERGIC RHINITIS: ICD-10-CM

## 2018-02-05 DIAGNOSIS — J45.21 INTERMITTENT ASTHMA, WITH ACUTE EXACERBATION: ICD-10-CM

## 2018-02-05 DIAGNOSIS — F41.8 DEPRESSION WITH ANXIETY: ICD-10-CM

## 2018-02-05 DIAGNOSIS — K21.9 GASTROESOPHAGEAL REFLUX DISEASE WITHOUT ESOPHAGITIS: ICD-10-CM

## 2018-02-05 DIAGNOSIS — R42 DIZZINESS: Primary | ICD-10-CM

## 2018-02-05 DIAGNOSIS — R11.0 NAUSEA: ICD-10-CM

## 2018-02-05 PROCEDURE — 99607 MTMS BY PHARM ADDL 15 MIN: CPT | Mod: U4 | Performed by: PHARMACIST

## 2018-02-05 PROCEDURE — 99605 MTMS BY PHARM NP 15 MIN: CPT | Mod: U4 | Performed by: PHARMACIST

## 2018-02-05 NOTE — MR AVS SNAPSHOT
After Visit Summary   2/5/2018    Joel Pineda    MRN: [id number]           Patient Information     Date Of Birth          1975        Visit Information        Provider Department      2/5/2018 3:00 PM Radha Mcdonald, [facility]        Today's Diagnoses     Dizziness    -  1    Essential hypertension with goal blood pressure less than 140/90        Bipolar 2 disorder (H)        Insomnia, unspecified type        Hypothyroidism, unspecified type        Hyperlipidemia LDL goal <130        Diarrhea, unspecified type        Nausea        Depression with anxiety        Intermittent asthma, with acute exacerbation        Nutritional deficiency        Chronic pain syndrome        Gastroesophageal reflux disease without esophagitis        Irritable bowel syndrome with diarrhea        Chronic nonallergic rhinitis           Follow-ups after your visit        Your next 10 appointments already scheduled     Feb 12, 2018  3:45 PM CST   Radiology Injections with Elaine Diaz MD   [facility] ([facility])    [address] [facility] [address]   [phone]            Feb 13, 2018 12:50 PM CST   [facility] with Shady Doherty, PT   [facility]    [address]  [facility] [address]   [phone]            Mar 19, 2018  3:00 PM CDT   TELEMEDICINE with Radha Mcdonald [facility] ([facility])    [address] [place] [address]  [address] [place] [id number] [phone]           Note: this is not an onsite visit; there is no need to come to the facility.              Who to contact     If you have questions or need follow up information about today's clinic visit or your schedule please contact [facility] directly at [phone].  Normal or non-critical lab and imaging results will be communicated to you by  MyChart, letter or phone within 4 business days after the clinic has received the results. If you do not hear from us within 7 days, please contact the clinic through [employer] or phone. If you have a critical or abnormal lab result, we will notify you by phone as soon as possible.  Submit refill requests through [employer] or call your pharmacy and they will forward the refill request to us. Please allow 3 business days for your refill to be completed.          Additional Information About Your Visit        [employer] Information     [employer] gives you secure access to your electronic health record. If you see a primary care provider, you can also send messages to your care team and make appointments. If you have questions, please call your primary care clinic.  If you do not have a primary care provider, please call [phone] and they will assist you.        Care EveryWhere ID     This is your Care EveryWhere ID. This could be used by other organizations to access your [facility] medical records  [id number]         Blood Pressure from Last 3 Encounters:   01/24/18 116/78   09/06/17 122/78   08/16/17 134/80    Weight from Last 3 Encounters:   01/24/18 (!) 311 lb 8 oz (141.3 kg)   09/06/17 (!) 310 lb 11.2 oz (140.9 kg)   08/16/17 (!) 314 lb 12.8 oz (142.8 kg)              Today, you had the following     No orders found for display         Today's Medication Changes          These changes are accurate as of 2/5/18 11:59 PM.  If you have any questions, ask your nurse or doctor.               These medicines have changed or have updated prescriptions.        Dose/Directions    cetirizine 10 MG tablet   Commonly known as:  zyrTEC   This may have changed:  when to take this   Used for:  Cough        Dose:  10 mg   Take 1 tablet (10 mg) by mouth At Bedtime   Quantity:  30 tablet   Refills:  11                Primary Care Provider Office Phone # Fax #    Ksenia Lyles [phone] [address] [facility]  [address]  [facility] [address]        Goals        General    I will continue to attend Psychiatry appointments for medication management, 1/14/2014 (pt-stated)     Notes - Note edited  8/24/2016  4:04 PM by Dalila Cavazos LSW    As of today's date 8/24/2016 goal is met at 51 - 75%.   Goal Status:  Ongoing Sees  Therapist every other week and Psych PA every 3 weeks.  As of today's date 5/25/2016 goal is met at 51 - 75%.   Goal Status:  Showing progress-  Meeting with Psych PA for second time tomorrow  To review meds As of today's date 4/11/2016 goal is met at 51 - 75%.   Goal Status:  Showing progress  As of today's date 1/14/2014 goal is met at 51 - 75%.   Goal Status:  Active        I will schedule therapy with new counselor, 1/14/2014 (pt-stated)     Notes - Note edited  5/25/2016  1:54 PM by Dalila Cavazos LSW    As of today's date 5/25/2016 goal is met at 76 - 100%.   Goal Status:  Ongoing New therapist and Psych PA on a regular basis  As of today's date 5/10/2016 goal is met at 76 - 100%.   Goal Status:  Ongoing met with new therapist at [facility]  As of today's date 4/11/2016 goal is met at 51 - 75%.   Goal Status:  Ongoing meets with therapist regularly  As of today's date 1/14/2014 goal is met at 0 - 25%.   Goal Status:  Active        Psychosocial I need some help with cleaning (pt-stated)     Notes - Note edited  6/23/2016  1:57 PM by Dalila Cavazos LSW    As of today's date 6/23/2016 goal is met at 26 - 50%.   Goal Status:  Showing progress met with [facility]. Has not yet made a decision  As of today's date 5/25/2016 goal is met at 0 - 25%.   Goal Status:  Ongoing agency had to reschedule appt.  As of today's date 5/10/2016 goal is met at 0 - 25%.   Goal Status:  Active referral to [facility]        Equal Access to Services     [facility]: Hadii floyd Darby, waalexanderda luqannie, qaybisabell burnett. [facility] [phone].    ATENCIÓN: Si  judith esteban, tiene a faustin disposición servicios gratuitos de asistencia lingüística. Madelyn eduardo [phone].    We comply with applicable federal civil rights laws and [place] laws. We do not discriminate on the basis of race, color, national origin, age, disability, sex, sexual orientation, or gender identity.            Thank you!     Thank you for choosing [facility]  for your care. Our goal is always to provide you with excellent care. Hearing back from our patients is one way we can continue to improve our services. Please take a few minutes to complete the written survey that you may receive in the mail after your visit with us. Thank you!             Your Updated Medication List - Protect others around you: Learn how to safely use, store and throw away your medicines at www.disposemymeds.org.          This list is accurate as of 2/5/18 11:59 PM.  Always use your most recent med list.                   Brand Name Dispense Instructions for use Diagnosis    acetaminophen 500 MG Caps     60 capsule    Take 500 mg by mouth every 4 hours as needed        albuterol 108 (90 BASE) MCG/ACT Inhaler    PROAIR HFA/PROVENTIL HFA/VENTOLIN HFA     Inhale 2 puffs into the lungs every 4 hours as needed        ALPRAZolam 1 MG 24 hr tablet    XANAX XR     Take 1 mg by mouth every morning        atorvastatin 40 MG tablet    LIPITOR    90 tablet    TAKE 1 TABLET (40 MG) BY MOUTH DAILY    Mixed hyperlipidemia       benztropine 1 MG tablet    COGENTIN     Take 1 mg by mouth 2 times daily Tasking in PM        celecoxib 200 MG capsule    celeBREX    180 capsule    TAKE 1 CAPSULE BY MOUTH TWICE DAILY AS NEEDED FORMODERATE PAIN    Chronic midline low back pain without sciatica       cetirizine 10 MG tablet    zyrTEC    30 tablet    Take 1 tablet (10 mg) by mouth At Bedtime    Cough       cyanocobalamin 1000 MCG/ML injection    VITAMIN B12    10 mL    Inject 1 mL (1,000 mcg) into the muscle every 30 days    B12  deficiency       DULoxetine 30 MG EC capsule    CYMBALTA     Take 90 mg by mouth daily        EPINEPHrine 0.3 MG/0.3ML injection 2-pack    EPIPEN/ADRENACLICK/or ANY BX GENERIC EQUIV    0.6 mL    Inject 0.3 mLs (0.3 mg) into the muscle once as needed for anaphylaxis    Food allergy       fenofibrate 145 MG tablet     90 tablet    Take 1 tablet (145 mg) by mouth daily    Mixed hyperlipidemia       lamoTRIgine 25 MG tablet    LaMICtal     Take 100 mg by mouth daily 200mg daily        levothyroxine 75 MCG tablet    SYNTHROID/LEVOTHROID    90 tablet    Take 1 tablet (75 mcg) by mouth every morning    Acquired hypothyroidism       lidocaine 5 % ointment    XYLOCAINE    250 g    Apply topically 3 times daily as needed for moderate pain Apply as directed    Chronic midline low back pain without sciatica       loperamide 2 MG capsule    IMODIUM     Take 2 mg by mouth 4 times daily as needed for diarrhea        metoprolol succinate 200 MG 24 hr tablet    TOPROL-XL     Take 200 mg by mouth daily        omeprazole 20 MG tablet      Take 20 mg by mouth daily        ondansetron 8 MG tablet    ZOFRAN    20 tablet    TAKE 1 TABLET (8 MG) BY MOUTH EVERY 8 HOURS AS NEEDED FOR NAUSEA/VOMITING.    Nausea       * order for DME     12 each    Equipment being ordered: 1 ml tuberculin syringes to be used for Vitamin B12 injections.    Vitamin B12 deficiency (non anaemic)       * order for DME     1 Units    SI-loc belt    SI (sacroiliac) joint dysfunction       * order for DME      Respironics REMSTAR 60 Series Auto CPAP 10-12 cm H2O, Wisp nasal mask w/a large cushion and a chinstrap        oxyCODONE IR 5 MG tablet    ROXICODONE    35 tablet    Take 1-2 tablets (5-10 mg) by mouth every 6 hours as needed for pain (maximum 4 tablet(s) per day)    Facet arthropathy       pregabalin 75 MG capsule    LYRICA    60 capsule    Take 1 capsule (75 mg) by mouth 2 times daily    Chronic midline low back pain without sciatica       ramipril 5 MG  capsule    ALTACE    90 capsule    TAKE 1 CAPSULE BY MOUTH DAILY    Hypertension goal BP (blood pressure) < 140/90       risperiDONE 1 MG tablet    risperDAL          tiZANidine 4 MG tablet    ZANAFLEX    90 tablet    Take 1 tablet (4 mg) by mouth 3 times daily as needed for muscle spasms    Dorsalgia       vitamin D3 50759 UNITS capsule    CHOLECALCIFEROL    24 capsule    Take one capsule every two weeks.    Vitamin D deficiency       * Notice:  This list has 3 medication(s) that are the same as other medications prescribed for you. Read the directions carefully, and ask your doctor or other care provider to review them with you.

## 2018-02-05 NOTE — PROGRESS NOTES
SUBJECTIVE/OBJECTIVE:                           Joel Pineda is a 42 year old male called for an initial visit for [facility] for 2018.  He was referred to me from Ksenia Lyles.     Chief Complaint: Dizzy.    Allergies/ADRs: Reviewed in Epic  Tobacco: No tobacco use  Alcohol: none  Caffeine: not assessed today  Activity: Not assessed today  PMH: Reviewed in Epic    Dizzy: all day whether he is lying or sitting. Patient feels like he has been drinking water and is urinating. Patient was seen about a week ago at [facility]. Patient was having abdominal pain and dizziness. At that time patient was diagnosed with viral gastroenteritis.    Hypertension: Current medications include ramipril 5mg daily, metoprolol XL 200mg daily. HR has been around 80-90bpm.  Patient does self-monitor BP. Home BP monitoring in range of 150's systolic over 100's diastolic.  Patient reports no current medication side effects. Patient has been having a little more pain than normal but is intermittent. Patient has not missed any doses of medication. Patient has been taking his blood pressure every couple of hours today because     Mood: current therapy includes lamictal 200mg daily (pt has been on this dose for about 3 weeks), patient has not noticed any dizziness with increase in dose. Patient also takes risperdal 1mg qhs (risperdal was restarted because he was having some delusions/hallucinations). Patient states he does wonder if he is still having some auditory hallucination. Benztropine 1mg bid (for jaw clenching secondary to risperdal). Patient stopped using his sleep appliance and went back to his regular CPAP apparatus and this has helped his jaw. Patient states his psychiatrist did notice some tongue movement on his last visit. Patient does have a follow-up with psychiatry on Thursday. Patient is using duloxetine 90 mg daily. Patient is participating IOP 3 hours a day 4 days a week. First hour is check in  "the second hour is developing a skill and the third hour is yoga. Patient likes the connection with the people.     Insomnia: Current medications include: None. Pt reports no issues. Patient stopped taking vistaril because he realized benztropine is also a antihistamine.     Anxiety: current therapy includes Xanax XR 1mg daily. Patient states this is \"about 80% effective\".    Pain: current therapy includes APAP as needed, celebrex 200mg bid prn (pt is taking every morning), lyrica. Patient is concerned celebrex could be affecting his kidney function. Patient is having a lumbar block next week and then will be having a radioablation. Patient rarely is using topical lidocaine. Patient uses it about once a month. It is expensive and patient won't likely refill. Patient uses oxycodone 10mg about every other day for flare ups. It is helpful for flare ups. Patient also uses tizanadie 4mg tid as needed for flare ups.     Asthma: current therapy includes albuterol. Patient has not had to use in over 6 months.    Hyperlipidemia: Current therapy includes atorvastatin 40mg once daily and Fenofibrate 145mg once daily.  Pt reports no significant myalgias or other side effects.   The 10-year ASCVD risk score (Wilbur DC Jr, et al., 2013) is: 5.1%    Values used to calculate the score:      Age: 42 years      Sex: Male      Is Non- : No      Diabetic: Yes      Tobacco smoker: No      Systolic Blood Pressure: 116 mmHg      Is BP treated: Yes      HDL Cholesterol: 31 mg/dL      Total Cholesterol: 178 mg/dL     Supplements: current therapy in includes Vitamin D 50, 000 units every 2 weeks and Vitamin B 12 inj and MVI. Last Vitamin D and Vitamin B level wnl.     Allergies: current therapy includes cetirizine. Patient is tolerating and working well.     IBD: patient rarely uses bentyl. Uses IB guard. Patient does have occasional spasming but using the IB guard in place of the bentyl.     Hypothyroidism: Patient is " taking levothyroxine 75 mcg daily. Patient is having the following symptoms: none.   TSH   Date Value Ref Range Status   01/18/2018 1.84 0.40 - 4.00 mU/L Final   ]    Diarrhea: current therapy includes loperamide. Patient takes about once a week.     GERD: Current medications include: Prilosec (omeprazole) 20 once daily. Pt c/o no current symptoms.  Patient feels that current regimen is effective.    Nausea: current therapy includes ondansetron 8 mg prn. Patient lately has been taking this a couple of times a day. He finds that this is effective.    Current labs include:  BP Readings from Last 3 Encounters:   01/24/18 116/78   09/06/17 122/78   08/16/17 134/80     Today's Vitals: There were no vitals taken for this visit.  Lab Results   Component Value Date    A1C 5.8 01/18/2018   .  Lab Results   Component Value Date    CHOL 178 01/18/2018     Lab Results   Component Value Date    TRIG 319 01/18/2018     Lab Results   Component Value Date    HDL 31 01/18/2018     Lab Results   Component Value Date    LDL 83 01/18/2018       Liver Function Studies -   Recent Labs   Lab Test  01/03/17   0825   PROTTOTAL  7.2   ALBUMIN  4.2   BILITOTAL  0.3   ALKPHOS  66   AST  18   ALT  27       Lab Results   Component Value Date    UCRR 58 01/18/2018    MICROL <5 01/18/2018    UMALCR Unable to calculate due to low value 01/18/2018       Last Basic Metabolic Panel:  Lab Results   Component Value Date     01/18/2018      Lab Results   Component Value Date    POTASSIUM 3.9 01/18/2018     Lab Results   Component Value Date    CHLORIDE 102 01/18/2018     Lab Results   Component Value Date    BUN 18 01/18/2018     Lab Results   Component Value Date    CR 1.10 01/18/2018     GFR Estimate   Date Value Ref Range Status   01/18/2018 73 >60 mL/min/1.7m2 Final     Comment:     Non  GFR Calc   05/03/2017 66 >60 mL/min/1.7m2 Final     Comment:     Non  GFR Calc   01/03/2017 59 (L) >60 mL/min/1.7m2 Final      Comment:     Non  GFR Calc     GFR Estimate If Black   Date Value Ref Range Status   01/18/2018 88 >60 mL/min/1.7m2 Final     Comment:      GFR Calc   05/03/2017 80 >60 mL/min/1.7m2 Final     Comment:      GFR Calc   01/03/2017 71 >60 mL/min/1.7m2 Final     Comment:      GFR Calc     TSH   Date Value Ref Range Status   01/18/2018 1.84 0.40 - 4.00 mU/L Final   ]    Most Recent Immunizations   Administered Date(s) Administered     Influenza (IIV3) PF 09/09/2013     Influenza Vaccine IM 3yrs+ 4 Valent IIV4 10/11/2016     Pneumo Conj 13-V (2010&after) 10/02/2015     Pneumococcal 23 valent 10/11/2016     TD (ADULT, 7+) 10/04/2002     TDAP Vaccine (Adacel) 07/16/2010       ASSESSMENT:                             Current medications were reviewed today.     Medication Adherence: no issues identified    Dizzy: Patient may benefit from follow-up with PCP for ongoing dizziness.    Hypertension: In clinic blood pressures have been stable. Most recently blood pressures have been running higher at home could possibly be related to a viral gastroenteritis the patient is experiencing.  Continue to monitor.    Mood: Stable.  Patient in close follow-up with psychiatry.    Insomnia: Stable.    Anxiety: Stable.    Pain: Stable.    Asthma: Stable.  Patient is meeting ACT goal of greater than 20.    Hyperlipidemia: Stable.    Supplements: Stable.      Allergies: Stable.    IBD: Stable.    Hypothyroidism: Stable.    Diarrhea: Stable.    GERD: Stable.    Nausea: Stable.    PLAN:                            Follow-up with PCP regarding ongoing dizziness.    I spent 60 minutes with this patient today (an extra 15 minutes was spent creating the Medication Action Plan). All changes were made via collaborative practice agreement with Ksenia Lyles. A copy of the visit note was provided to the patient's primary care provider.    Will follow up in 6 weeks.    The patient was  sent via [employer] a summary of these recommendations as an after visit summary.     Radha Mcdonald, Pharm.D, BCACP  Medication Therapy Management Pharmacist

## 2018-02-05 NOTE — TELEPHONE ENCOUNTER
..Reason for Call:  Other     Detailed comments: Patient wanted to know should he come in if blood pressure is 150/105 and he is feeling dizzy off and on.    Phone Number Patient can be reached at: Home number on file [phone] (home)    Best Time: anytime      Can we leave a detailed message on this number? YES    Call taken on 2/5/2018 at 3:49 PM by Ayanna Feng

## 2018-02-05 NOTE — LETTER
"     [facility]     Date: 2018    Joel Pineda  [address] [facility] [address] [place] [address]    Dear Mr. Pineda,    Thank you for talking with me on 2018 about your health and medications. Medicare s MTM (Medication Therapy Management) program helps you understand your medications and use them safely.      This letter includes an action plan (Medication Action Plan) and medication list (Personal Medication List). The action plan has steps you should take to help you get the best results from your medications. The medication list will help you keep track of your medications and how to use them the right way.       Have your action plan and medication list with you when you talk with your doctors, pharmacists, and other healthcare providers in your care team.     Ask your doctors, pharmacists, and other healthcare providers to update the action plan and medication list at every visit.     Take your medication list with you if you go to the hospital or emergency room.     Give a copy of the action plan and medication list to your family or caregivers.     If you want to talk about this letter or any of the papers with it, please call   [phone].   We look forward to working with you, your doctors, and other healthcare providers to help you stay healthy.     Sincerely,    Radha Mcdonald      Enclosed: Medication Action Plan and Personal Medication List    MEDICATION ACTION PLAN FOR Joel Pineda,  1975     This action plan will help you get the best results from your medications if you:   1. Read \"What we talked about.\"   2. Take the steps listed in the \"What I need to do\" boxes.   3. Fill in \"What I did and when I did it.\"   4. Fill in \"My follow-up plan\" and \"Questions I want to ask.\"     Have this action plan with you when you talk with your doctors, pharmacists, and other healthcare providers in your care team. Share this with your family or caregivers " too.    If you have any questions about your action plan, call Radha Mcdonald, Phone: [phone] , Monday-Friday 8-4:30pm.          MEDICATION LIST FOR Joel Pineda  1975     This medication list was made for you after we talked. We also used information from your doctor's chart.      Use blank rows to add new medications. Then fill in the dates you started using them.    Cross out medications when you no longer use them. Then write the date and why you stopped using them.    Ask your doctors, pharmacists, and other healthcare providers to update this list at every visit. Keep this list up-to-date with:       Prescription medications    Over the counter drugs     Herbals    Vitamins    Minerals      If you go to the hospital or emergency room, take this list with you. Share this with your family or caregivers too.     DATE PREPARED: 2018  Allergies or side effects: Banana; Nitroglycerin; Penicillins; Provigil [modafinil]; Ciprofloxacin; Gadolinium; Dulera; Dye [contrast dye]; Levaquin; Levofloxacin; Neurontin [gabapentin]; Nortriptyline; Varicella virus vaccine live; Ciprofloxacin; Flagyl [metronidazole hcl]; Latex; Metronidazole; and No clinical screening - see comments     Medication:  ACETAMINOPHEN 500 MG PO CAPS      How I use it:  Take 500 mg by mouth every 4 hours as needed      Why I use it: Pain      Prescriber:  Ksenia Lyles MD      Date I started using it:       Date I stopped using it:         Why I stopped using it:            Medication:  ALBUTEROL SULFATE  (90 BASE) MCG/ACT IN AERS      How I use it:  Inhale 2 puffs into the lungs every 4 hours as needed      Why I use it: Asthma      Prescriber:  Patient Reported      Date I started using it:       Date I stopped using it:         Why I stopped using it:            Medication:  ALPRAZOLAM ER 1 MG PO TB24      How I use it:  Take 1 mg by mouth every morning      Why I use it: Anxiety      Prescriber:  Patient  Reported      Date I started using it:       Date I stopped using it:         Why I stopped using it:            Medication:  ATORVASTATIN CALCIUM 40 MG PO TABS      How I use it:  TAKE 1 TABLET (40 MG) BY MOUTH DAILY      Why I use it: Mixed hyperlipidemia    Prescriber:  Ksenia Lyles MD      Date I started using it:       Date I stopped using it:         Why I stopped using it:            Medication:  BENZTROPINE MESYLATE 1 MG PO TABS      How I use it:  Take 1 mg by mouth 2 times daily Tasking in PM      Why I use it: Jaw Clenching      Prescriber:  Patient Reported      Date I started using it:       Date I stopped using it:         Why I stopped using it:            Medication:  CELECOXIB 200 MG PO CAPS      How I use it:  TAKE 1 CAPSULE BY MOUTH TWICE DAILY AS NEEDED FORMODERATE PAIN      Why I use it: Chronic midline low back pain without sciatica    Prescriber:  Ksenia Lyles MD      Date I started using it:       Date I stopped using it:         Why I stopped using it:            Medication:  CETIRIZINE HCL 10 MG PO TABS      How I use it:  Take 1 tablet (10 mg) by mouth At Bedtime      Why I use it: Cough    Prescriber:  Houston Koroma MD      Date I started using it:       Date I stopped using it:         Why I stopped using it:            Medication:  CYANOCOBALAMIN 1000 MCG/ML IJ SOLN      How I use it:  Inject 1 mL (1,000 mcg) into the muscle every 30 days      Why I use it: B12 deficiency    Prescriber:  Ksenia Lyles MD      Date I started using it:       Date I stopped using it:         Why I stopped using it:            Medication:  DULOXETINE HCL 30 MG PO CPEP      How I use it:  Take 90 mg by mouth daily      Why I use it: Depression      Prescriber:  Patient Reported      Date I started using it:       Date I stopped using it:         Why I stopped using it:            Medication:  EPINEPHRINE 0.3 MG/0.3ML IJ SOAJ      How I use it:  Inject 0.3 mLs (0.3 mg) into  the muscle once as needed for anaphylaxis      Why I use it: Food allergy    Prescriber:  Ksenia Lyles MD      Date I started using it:       Date I stopped using it:         Why I stopped using it:            Medication:  FENOFIBRATE 145 MG PO TABS      How I use it:  Take 1 tablet (145 mg) by mouth daily      Why I use it: Mixed hyperlipidemia    Prescriber:  Ksenia Lyles MD      Date I started using it:       Date I stopped using it:         Why I stopped using it:            Medication:  LAMOTRIGINE 25 MG PO TABS      How I use it:  Take 100 mg by mouth daily 200mg daily      Why I use it: Depression/BiPolar Disorder      Prescriber:  Patient Reported      Date I started using it:       Date I stopped using it:         Why I stopped using it:            Medication:  LEVOTHYROXINE SODIUM 75 MCG PO TABS      How I use it:  Take 1 tablet (75 mcg) by mouth every morning      Why I use it: Acquired hypothyroidism    Prescriber:  Ksenia Lyles MD      Date I started using it:       Date I stopped using it:         Why I stopped using it:            Medication:  LIDOCAINE 5 % EX OINT      How I use it:  Apply topically 3 times daily as needed for moderate pain Apply as directed      Why I use it: Chronic midline low back pain without sciatica    Prescriber:  Ksenia Lyles MD      Date I started using it:       Date I stopped using it:         Why I stopped using it:            Medication:  LOPERAMIDE HCL 2 MG PO CAPS      How I use it:  Take 2 mg by mouth 4 times daily as needed for diarrhea      Why I use it: Diarrhea      Prescriber:  Patient Reported      Date I started using it:       Date I stopped using it:         Why I stopped using it:            Medication:  METOPROLOL SUCCINATE  MG PO TB24      How I use it:  Take 200 mg by mouth daily      Why I use it: Blood pressure      Prescriber:  Patient Reported      Date I started using it:       Date I stopped using it:          Why I stopped using it:            Medication:  OMEPRAZOLE 20 MG PO TBEC      How I use it:  Take 20 mg by mouth daily       Why I use it: Acid Reflux      Prescriber:  Patient Reported      Date I started using it:       Date I stopped using it:         Why I stopped using it:            Medication:  ONDANSETRON HCL 8 MG PO TABS      How I use it:  TAKE 1 TABLET (8 MG) BY MOUTH EVERY 8 HOURS AS NEEDED FOR NAUSEA/VOMITING.      Why I use it: Nausea    Prescriber:  KEVAN Eldridge      Date I started using it:       Date I stopped using it:         Why I stopped using it:            Medication:  ORDER FOR DME (SET TO LOCAL PRINT)      How I use it:  Equipment being ordered: 1 ml tuberculin syringes to be used for Vitamin B12 injections.      Why I use it: Vitamin B12 deficiency (non anaemic)    Prescriber:  Houston Koroma MD      Date I started using it:       Date I stopped using it:         Why I stopped using it:            Medication:  OXYCODONE HCL 5 MG PO TABS      How I use it:  Take 1-2 tablets (5-10 mg) by mouth every 6 hours as needed for pain (maximum 4 tablet(s) per day)      Why I use it: Facet arthropathy    Prescriber:  Ksenia Lyles MD      Date I started using it:       Date I stopped using it:         Why I stopped using it:            Medication:  PREGABALIN 75 MG PO CAPS      How I use it:  Take 1 capsule (75 mg) by mouth 2 times daily      Why I use it: Chronic midline low back pain without sciatica    Prescriber:  Ksenia Lyles MD      Date I started using it:       Date I stopped using it:         Why I stopped using it:            Medication:  RAMIPRIL 5 MG PO CAPS      How I use it:  TAKE 1 CAPSULE BY MOUTH DAILY      Why I use it: Hypertension goal BP (blood pressure) < 140/90    Prescriber:  Ksenia Lyles MD      Date I started using it:       Date I stopped using it:         Why I stopped using it:            Medication:  RISPERIDONE 1  MG PO TABS      How I use it:         Why I use it: BiPolar Disorder      Prescriber:  Patient Reported      Date I started using it:       Date I stopped using it:         Why I stopped using it:            Medication:  TIZANIDINE HCL 4 MG PO TABS      How I use it:  Take 1 tablet (4 mg) by mouth 3 times daily as needed for muscle spasms      Why I use it: Dorsalgia    Prescriber:  Ksenia Lyles MD      Date I started using it:       Date I stopped using it:         Why I stopped using it:            Medication:  VITAMIN D3 (CHOLECALCIFEROL) 56063 UNIT PO CAPS      How I use it:  Take one capsule every two weeks.      Why I use it: Vitamin D deficiency    Prescriber:  Ksenia Lyels MD      Date I started using it:       Date I stopped using it:         Why I stopped using it:            Medication:         How I use it:         Why I use it:      Prescriber:         Date I started using it:       Date I stopped using it:         Why I stopped using it:            Medication:         How I use it:         Why I use it:      Prescriber:         Date I started using it:       Date I stopped using it:         Why I stopped using it:            Medication:         How I use it:         Why I use it:      Prescriber:         Date I started using it:       Date I stopped using it:         Why I stopped using it:              Other Information:     If you have any questions about your action plan, call [phone].    According to the Paperwork Reduction Act of 1995, no persons are required to respond to a collection of information unless it displays a valid OMB control number. The valid OMB number for this information collection is [id number]. The time required to complete this information collection is estimated to average 40 minutes per response, including the time to review instructions, searching existing data resources, gather the data needed, and complete and review the information collection. If  you have any comments concerning the accuracy of the time estimate(s) or suggestions for improving this form, please write to: CMS, Attn: [employer] Reports Clearance Officer, [address] [place] [address], [place], [place] [address].

## 2018-02-06 NOTE — TELEPHONE ENCOUNTER
"Joel Pineda is a 42 year old male who calls with dizziness, ongoing abdominal pain and elevated BP.    NURSING ASSESSMENT:  Description:  Dizziness, increased BP earlier in the day, stomach pain  Onset/duration:  BP today (first time he checked it in \"awhile\"), dizziness started yesterday. Abdominal issues since last Tuesday that he was seen in Urgent care for. Diagnosed with gastroenteritis.   Precip. factors:  Gastroenteritis, history of HTN  Associated symptoms:  Denies fever, vision changes or problems, trauma, headache, vomiting, diarrhea, weakness, earache or ringing in ears, fainting spells or pale skin  Improves/worsens symptoms:  Laying down helps a little bit, has gotten better as the day has gone on  Pain scale (0-10)   2/10-abdomen  LMP/preg/breast feeding:  N/A  Last exam/Treatment:  1/24/18  Allergies:   Allergies   Allergen Reactions     Banana Shortness Of Breath     Pt reports organic Banana is okay.      Nitroglycerin Palpitations     Penicillins Anaphylaxis     Provigil [Modafinil] Shortness Of Breath     headache     Ciprofloxacin Palpitations, Other (See Comments) and Rash     dizziness (versus metronidazole taken at same time)     Gadolinium Hives and Itching     Patient was premedicated for the contrast allergy. He did still have a reaction a few hours after injection. Hives and itching. Dr. Gomez told tech to inform pt he should only have contrast again in the future when premedicated and at a hospital. Not at an outpatient facility.      Dulera Other (See Comments)     Hoarse voice     Dye [Contrast Dye] Other (See Comments) and Hives     Moderate flushing, CT contrast     Levaquin Other (See Comments)     tendonitis     Levofloxacin      Tendonitis  Tendonitis     Neurontin [Gabapentin] Hives     Moderate hives     Nortriptyline Hives     Varicella Virus Vaccine Live      Rash     Ciprofloxacin Palpitations     Flagyl [Metronidazole Hcl] Palpitations and Hives     Latex Rash     " "Metronidazole Palpitations, Other (See Comments) and Rash     dizziness (versus ciprofloxacin taken at same time)     No Clinical Screening - See Comments Rash     Nitrile gloves       MEDICATIONS:   Taking medication(s) as prescribed? Yes  Taking over the counter medication(s?) No  Any medication side effects? No significant side effects    Any barriers to taking medication(s) as prescribed?  No  Medication(s) improving/managing symptoms?  Somewhat  Medication reconciliation completed: No      NURSING PLAN: Nursing advice to patient home care and be seen in clinic     RECOMMENDED DISPOSITION:  Home care advice - push fluids, small meals throughout the day and evening of bland foods.   Will comply with recommendation: Patient stated he will \"see how he feels in the morning\" and he may come into Urgent Care if not feeling better.  If further questions/concerns or if symptoms do not improve, worsen or new symptoms develop, call your PCP or [facility] as soon as possible.    Spoke with patient and he does not normally check his BP's on a regular basis. His BP had decreased to 130/82 and the dizziness had subsided when I was speaking to him. He denied any headache, vision problems, trauma and he also had gastroenteritis last week. Some continuing abdominal pain also in the left lower quadrant after eating but that had also gotten better by the time we spoke. Attempted to schedule patient for recheck in clinic but he said that he had an appointment tomorrow for his C-Pap. Patient said that he will \"see how he feels in the morning and if not feeling better, I will go to the urgent care\". Notified patient that if things worsen or if new symptoms occur overnight or before tomorrow to go to ED sooner. Also reviewed staying hydrated due to gastroenteritis and to eat smaller more frequent meals that are bland if he is still having stomach issues.       Guideline used:  Telephone Triage Protocols for Nurses, Fifth " Edition, Holly Kearns  Dizziness    Martell Chaves RN, BSN

## 2018-02-07 ENCOUNTER — TELEPHONE (OUTPATIENT)
Dept: NEPHROLOGY | Facility: CLINIC | Age: 43
End: 2018-02-07

## 2018-02-07 NOTE — TELEPHONE ENCOUNTER
Reason for call:  Other   Patient called regarding (reason for call): appointment  Additional comments: Has appointment, New with Misael, nephrology, on 3/12/18 for stage 3 kidney disease.  He had a question about blood work.  We did schedule lab as well, but he states that sometimes the blood work in in normal range.     Phone number to reach patient:  Cell number on file:    Telephone Information:   Mobile [phone]       Best Time:  Any     Can we leave a detailed message on this number?  YES

## 2018-02-12 ENCOUNTER — RADIANT APPOINTMENT (OUTPATIENT)
Dept: RADIOLOGY | Facility: CLINIC | Age: 43
End: 2018-02-12
Attending: PSYCHIATRY & NEUROLOGY
Payer: COMMERCIAL

## 2018-02-12 ENCOUNTER — RADIOLOGY INJECTION OFFICE VISIT (OUTPATIENT)
Dept: PALLIATIVE MEDICINE | Facility: CLINIC | Age: 43
End: 2018-02-12
Payer: COMMERCIAL

## 2018-02-12 VITALS — OXYGEN SATURATION: 95 % | SYSTOLIC BLOOD PRESSURE: 133 MMHG | HEART RATE: 96 BPM | DIASTOLIC BLOOD PRESSURE: 92 MMHG

## 2018-02-12 DIAGNOSIS — M47.819 FACET ARTHROPATHY: Primary | ICD-10-CM

## 2018-02-12 DIAGNOSIS — M47.816 SPONDYLOSIS OF LUMBAR REGION WITHOUT MYELOPATHY OR RADICULOPATHY: ICD-10-CM

## 2018-02-12 PROCEDURE — 64493 INJ PARAVERT F JNT L/S 1 LEV: CPT | Mod: 50 | Performed by: PSYCHIATRY & NEUROLOGY

## 2018-02-12 PROCEDURE — 64494 INJ PARAVERT F JNT L/S 2 LEV: CPT | Mod: 50 | Performed by: PSYCHIATRY & NEUROLOGY

## 2018-02-12 PROCEDURE — A9585 GADOBUTROL INJECTION: HCPCS | Mod: JW | Performed by: PSYCHIATRY & NEUROLOGY

## 2018-02-12 RX ORDER — GADOBUTROL 604.72 MG/ML
5 INJECTION INTRAVENOUS ONCE
Status: COMPLETED | OUTPATIENT
Start: 2018-02-12 | End: 2018-02-12

## 2018-02-12 RX ADMIN — GADOBUTROL 3 ML: 604.72 INJECTION INTRAVENOUS at 16:30

## 2018-02-12 ASSESSMENT — PAIN SCALES - GENERAL: PAINLEVEL: MODERATE PAIN (5)

## 2018-02-12 NOTE — NURSING NOTE
"Chief Complaint   Patient presents with     Pain       Initial /88  Pulse 93 Estimated body mass index is 36.94 kg/(m^2) as calculated from the following:    Height as of 1/24/18: 1.956 m (6' 5\").    Weight as of 1/24/18: 141.3 kg (311 lb 8 oz).  Medication Reconciliation: complete     Pre-procedure Intake    Have you been fasting? NA    If yes, for how long? No     Are you taking a prescribed blood thinner such as coumadin, Plavix, Xarelto?    No    If yes, when did you take your last dose? No     Do you take aspirin?  No    If cervical procedure, have you held aspirin for 6 days?   NA    Do you have any allergies to contrast dye, iodine, steroid and/or numbing medications?  Yes, Iodine     Are you currently taking antibiotics or have an active infection?  NO    Have you had a fever/elevated temperature within the past week? NO    Are you currently taking oral steroids? NO    Do you have a ? Yes       Are you pregnant or breastfeeding?  Not Applicable    Are the vital signs normal?  Yes    Alexandre Curran MA            "

## 2018-02-12 NOTE — PROGRESS NOTES
Pre procedure Diagnosis: facet arthropathy   Post procedure Diagnosis: Same  Procedure performed: Bilateral L3,4,5 medial branch blocks #1 (prior to repeat radiofrequency ablation)  Anesthesia: none  Complications: None  Operators: Ericka Diaz MD and Rm Lloyd DO    Indications:   Joel Pineda is a 42 year old male seen by me in clinic for repeat lumbar medial branch block to get approved for repeat radiofrequency ablation .  They have a history of axial back pain worse with activity.  Exam shows pain with extension/rotation and they have tried conservative treatment including PT and oral medications. He has had relief with lumbar RFA in the past for 6+ months.    Options/alternatives, benefits and risks were discussed with the patient including bleeding, infection, tissue trauma, exposure to radiation, reaction to medications, spinal cord injury, weakness, numbness and paralysis.  Questions were answered to his satisfaction and he agrees to proceed. Voluntary informed consent was obtained and signed.     Vitals were reviewed: Yes  Allergies were reviewed:  Yes .  He has been fine with lower doses of gadolinium and has had in this clinic before.  Was find with using for medial branch block   Medications were reviewed:  Yes   Pre-procedure pain score: 5/10    Procedure:  After getting informed consent, patient was brought into the procedure suite and was placed in a prone position on the procedure table.   A Pause for the Cause was performed.  Patient was prepped and draped in sterile fashion.     Under AP fluoroscopic guidance the L3, L4, L5 vertebral bodies were identified. The C-arm was rotated to the right, then left oblique view to afford optimal visualization the pedicles.  Lidocaine 1% was used to anesthetize the skin at each level.  Under intermittent fluoroscopy, 22G 5inch Quinke spinal needles were positioned inferior and lateral to the intersection of the transverse process and pedicle at the  Right L4 & L5 levels, as well as the corresponding sacral alar notch. The needle positions were verified and optimized from the AP view.    The anatomic targets for the L3 & L4 medial nerve and L5 dorsal ramus (which functionally incorporates the medial branch) were the  L4 & L5 transverse processes and sacral alar notch, with laterality as described above, resulting in blockade of the L4/5 and L5/S1 facet joints.    Gadolinium was injected, and appropriate spread of contrast was noted without vascular uptake.  A total of 3ml of Gadolinium was used.  2ml was wasted. Bupivacaine 0.5% 0.5 ml was injected at each location.  The needles were removed.        Hemostasis was achieved, the area was cleaned, and bandaids were placed when appropriate.  The patient tolerated the procedure well, and was taken to the recovery room.    Images were saved to PACS.    The patient will continue to monitor progress, and they were given a pain diary to complete at home.  They will either fax or mail this back to us or bring it to their next appointment. We will determine the treatment plan after we review the diary.      Post-procedure pain score: 0/10  Follow-up includes:   -f/u phone call in one week  -will await diary for further planning.    Ericka Diaz MD  [facility]

## 2018-02-12 NOTE — MR AVS SNAPSHOT
After Visit Summary   2/12/2018    Joel Pineda    MRN: [id number]           Patient Information     Date Of Birth          1975        Visit Information        Provider Department      2/12/2018 3:45 PM Elaine Diaz MD [facility]        Care Instructions    [facility]   Medial Branch Block Discharge Instructions      Your procedure was performed by: Dr. Ericka Diaz     Medications used include:  Lidocaine  Bupivicaine  Omnipaque      You will need to complete the Pain Scale Log form and return it to us as soon as possible.  Once we have received the form, we will review it and call you to determine the next steps.     The form can be faxed to [phone] or mailed to:   [facility]   [id number] [place] [address] [address]   [place] [address]      You may resume your regular medications    You may resume your regular activities    Be cautious with walking as numbness and/or weakness in the lower extremities may occur for up to 6-8 hours due to the effects of the anesthetic.    Avoid driving for 6 hours. The local anesthetic could slow your reflexes.     You may shower, however no swimming or tub baths or hot tubs for 24 hours following your procedure.    Your pain will return after the numbing medications have worn off.  You may use your current pain medications as needed.    You may use anti-inflammatory medications (such as ibuprofen, aleve, or advil) or Tylenol for pain control if necessary.  Some people find it helpful to alternate ibuprofen and Tylenol every 3 hours for a couple of days.    You may use ice packs 10-15 minutes three to four times a day at the injection site for comfort.     Do not use heat to painful areas for 6 to 8 hours. This will give the local anesthetic time to wear off and prevent you from accidentally burning your skin.     If you experience any of the following, call the pain center nursing line during work  hours at [phone] or the after hours provider line at [phone]:  -Fever over 100 degree F  -Swelling, bleeding, redness, drainage, warmth at the injection site  -Progressive weakness or numbness in your legs  -If lumbar, call if you have a loss of bowel or bladder function  -Unusual new onset of pain that is not improving            Follow-ups after your visit        Your next 10 appointments already scheduled     Feb 13, 2018 12:50 PM CST   [facility] with Shady Doherty, PT   [facility] ([facility] [place])    [address]  [address]  [place] [address] [phone]            Mar 12, 2018  3:00 PM CDT   LAB with LAB FIRST FLOOR [facility] ([facility])    [address] [place] [id number] [phone]           Please do not eat 10-12 hours before your appointment if you are coming in fasting for labs on lipids, cholesterol, or glucose (sugar). This does not apply to pregnant women. Water, hot tea and black coffee (with nothing added) are okay. Do not drink other fluids, diet soda or chew gum.            Mar 12, 2018  3:30 PM CDT   New Visit with Kimberly Douglas MD   [facility]    [address] [place] [id number] [phone]            Mar 19, 2018  3:00 PM CDT   TELEMEDICINE with Radha Mcdonald [facility]    [address] [place] [address]  [id number]  [facility] [id number] [phone]           Note: this is not an onsite visit; there is no need to come to the facility.              Who to contact     If you have questions or need follow up information about today's clinic visit or your schedule please contact [facility] directly at [phone].  Normal or non-critical lab and imaging results will be communicated to you by MyChart, letter or phone within 4 business days after the clinic  has received the results. If you do not hear from us within 7 days, please contact the clinic through [facility] or phone. If you have a critical or abnormal lab result, we will notify you by phone as soon as possible.  Submit refill requests through [facility] or call your pharmacy and they will forward the refill request to us. Please allow 3 business days for your refill to be completed.          Additional Information About Your Visit        FRES[facility]ar[facility]io Information     [facility] gives you secure access to your electronic health record. If you see a primary care provider, you can also send messages to your care team and make appointments. If you have questions, please call your primary care clinic.  If you do not have a primary care provider, please call [phone] and they will assist you.        Care EveryWhere ID     This is your Care EveryWhere ID. This could be used by other organizations to access your [facility] medical records  [id number]        Your Vitals Were     Pulse                   93            Blood Pressure from Last 3 Encounters:   02/12/18 136/88   01/24/18 116/78   09/06/17 122/78    Weight from Last 3 Encounters:   01/24/18 (!) 141.3 kg (311 lb 8 oz)   09/06/17 (!) 140.9 kg (310 lb 11.2 oz)   08/16/17 (!) 142.8 kg (314 lb 12.8 oz)              Today, you had the following     No orders found for display         Today's Medication Changes          These changes are accurate as of 2/12/18  4:16 PM.  If you have any questions, ask your nurse or doctor.               These medicines have changed or have updated prescriptions.        Dose/Directions    cetirizine 10 MG tablet   Commonly known as:  zyrTEC   This may have changed:  when to take this   Used for:  Cough        Dose:  10 mg   Take 1 tablet (10 mg) by mouth At Bedtime   Quantity:  30 tablet   Refills:  11                Primary Care Provider Office Phone # Fax #    Ksenia Lyles [phone] [address]  [place]  [facility] [address]        Goals        General    I will continue to attend Psychiatry appointments for medication management, 1/14/2014 (pt-stated)     Notes - Note edited  8/24/2016  4:04 PM by Dalila Cavazos LSW    As of today's date 8/24/2016 goal is met at 51 - 75%.   Goal Status:  Ongoing Sees  Therapist every other week and Psych PA every 3 weeks.  As of today's date 5/25/2016 goal is met at 51 - 75%.   Goal Status:  Showing progress-  Meeting with Psych PA for second time tomorrow  To review meds As of today's date 4/11/2016 goal is met at 51 - 75%.   Goal Status:  Showing progress  As of today's date 1/14/2014 goal is met at 51 - 75%.   Goal Status:  Active        I will schedule therapy with new counselor, 1/14/2014 (pt-stated)     Notes - Note edited  5/25/2016  1:54 PM by Dalila Cavazos LSW    As of today's date 5/25/2016 goal is met at 76 - 100%.   Goal Status:  Ongoing New therapist and Psych PA on a regular basis  As of today's date 5/10/2016 goal is met at 76 - 100%.   Goal Status:  Ongoing met with new therapist at [facility]  As of today's date 4/11/2016 goal is met at 51 - 75%.   Goal Status:  Ongoing meets with therapist regularly  As of today's date 1/14/2014 goal is met at 0 - 25%.   Goal Status:  Active        Psychosocial I need some help with cleaning (pt-stated)     Notes - Note edited  6/23/2016  1:57 PM by Dalila Cavazos LSW    As of today's date 6/23/2016 goal is met at 26 - 50%.   Goal Status:  Showing progress met with [facility]. Has not yet made a decision  As of today's date 5/25/2016 goal is met at 0 - 25%.   Goal Status:  Ongoing agency had to reschedule appt.  As of today's date 5/10/2016 goal is met at 0 - 25%.   Goal Status:  Active referral to [facility]        Equal Access to Services     [facility] AH: Hadii floyd Darby, waalexanderda luqadaha, qaybta isabell manriquez. [facility] [phone].    ATENCIÓN: Theresa wong  español, tiene a faustin disposición servicios gratuitos de asistencia lingüística. Madelyn eduardo [phone].    We comply with applicable federal civil rights laws and [place] laws. We do not discriminate on the basis of race, color, national origin, age, disability, sex, sexual orientation, or gender identity.            Thank you!     Thank you for choosing [facility]  for your care. Our goal is always to provide you with excellent care. Hearing back from our patients is one way we can continue to improve our services. Please take a few minutes to complete the written survey that you may receive in the mail after your visit with us. Thank you!             Your Updated Medication List - Protect others around you: Learn how to safely use, store and throw away your medicines at www.disposemymeds.org.          This list is accurate as of 2/12/18  4:16 PM.  Always use your most recent med list.                   Brand Name Dispense Instructions for use Diagnosis    acetaminophen 500 MG Caps     60 capsule    Take 500 mg by mouth every 4 hours as needed        albuterol 108 (90 BASE) MCG/ACT Inhaler    PROAIR HFA/PROVENTIL HFA/VENTOLIN HFA     Inhale 2 puffs into the lungs every 4 hours as needed        ALPRAZolam 1 MG 24 hr tablet    XANAX XR     Take 1 mg by mouth every morning        atorvastatin 40 MG tablet    LIPITOR    90 tablet    TAKE 1 TABLET (40 MG) BY MOUTH DAILY    Mixed hyperlipidemia       benztropine 1 MG tablet    COGENTIN     Take 1 mg by mouth 2 times daily Tasking in PM        celecoxib 200 MG capsule    celeBREX    180 capsule    TAKE 1 CAPSULE BY MOUTH TWICE DAILY AS NEEDED FORMODERATE PAIN    Chronic midline low back pain without sciatica       cetirizine 10 MG tablet    zyrTEC    30 tablet    Take 1 tablet (10 mg) by mouth At Bedtime    Cough       cyanocobalamin 1000 MCG/ML injection    VITAMIN B12    10 mL    Inject 1 mL (1,000 mcg) into the muscle every 30 days    B12 deficiency        DULoxetine 30 MG EC capsule    CYMBALTA     Take 90 mg by mouth daily        EPINEPHrine 0.3 MG/0.3ML injection 2-pack    EPIPEN/ADRENACLICK/or ANY BX GENERIC EQUIV    0.6 mL    Inject 0.3 mLs (0.3 mg) into the muscle once as needed for anaphylaxis    Food allergy       fenofibrate 145 MG tablet     90 tablet    Take 1 tablet (145 mg) by mouth daily    Mixed hyperlipidemia       lamoTRIgine 25 MG tablet    LaMICtal     Take 100 mg by mouth daily 200mg daily        levothyroxine 75 MCG tablet    SYNTHROID/LEVOTHROID    90 tablet    Take 1 tablet (75 mcg) by mouth every morning    Acquired hypothyroidism       lidocaine 5 % ointment    XYLOCAINE    250 g    Apply topically 3 times daily as needed for moderate pain Apply as directed    Chronic midline low back pain without sciatica       loperamide 2 MG capsule    IMODIUM     Take 2 mg by mouth 4 times daily as needed for diarrhea        metoprolol succinate 200 MG 24 hr tablet    TOPROL-XL     Take 200 mg by mouth daily        omeprazole 20 MG tablet      Take 20 mg by mouth daily        ondansetron 8 MG tablet    ZOFRAN    20 tablet    TAKE 1 TABLET (8 MG) BY MOUTH EVERY 8 HOURS AS NEEDED FOR NAUSEA/VOMITING.    Nausea       * order for DME     12 each    Equipment being ordered: 1 ml tuberculin syringes to be used for Vitamin B12 injections.    Vitamin B12 deficiency (non anaemic)       * order for DME     1 Units    SI-loc belt    SI (sacroiliac) joint dysfunction       * order for DME      Respironics REMSTAR 60 Series Auto CPAP 10-12 cm H2O, Wisp nasal mask w/a large cushion and a chinstrap        oxyCODONE IR 5 MG tablet    ROXICODONE    35 tablet    Take 1-2 tablets (5-10 mg) by mouth every 6 hours as needed for pain (maximum 4 tablet(s) per day)    Facet arthropathy       pregabalin 75 MG capsule    LYRICA    60 capsule    Take 1 capsule (75 mg) by mouth 2 times daily    Chronic midline low back pain without sciatica       ramipril 5 MG capsule    ALTACE    90  capsule    TAKE 1 CAPSULE BY MOUTH DAILY    Hypertension goal BP (blood pressure) < 140/90       risperiDONE 1 MG tablet    risperDAL          tiZANidine 4 MG tablet    ZANAFLEX    90 tablet    Take 1 tablet (4 mg) by mouth 3 times daily as needed for muscle spasms    Dorsalgia       vitamin D3 11893 UNITS capsule    CHOLECALCIFEROL    24 capsule    Take one capsule every two weeks.    Vitamin D deficiency       * Notice:  This list has 3 medication(s) that are the same as other medications prescribed for you. Read the directions carefully, and ask your doctor or other care provider to review them with you.

## 2018-02-12 NOTE — PATIENT INSTRUCTIONS
[facility]   Medial Branch Block Discharge Instructions      Your procedure was performed by: Dr. Ericka Diaz     Medications used include:  Lidocaine  Bupivicaine  Omniscan      You will need to complete the Pain Scale Log form and return it to us as soon as possible.  Once we have received the form, we will review it and call you to determine the next steps.     The form can be faxed to [phone] or mailed to:   [facility]   [address]   [place] [address]      You may resume your regular medications    You may resume your regular activities    Be cautious with walking as numbness and/or weakness in the lower extremities may occur for up to 6-8 hours due to the effects of the anesthetic.    Avoid driving for 6 hours. The local anesthetic could slow your reflexes.     You may shower, however no swimming or tub baths or hot tubs for 24 hours following your procedure.    Your pain will return after the numbing medications have worn off.  You may use your current pain medications as needed.    You may use anti-inflammatory medications (such as ibuprofen, aleve, or advil) or Tylenol for pain control if necessary.  Some people find it helpful to alternate ibuprofen and Tylenol every 3 hours for a couple of days.    You may use ice packs 10-15 minutes three to four times a day at the injection site for comfort.     Do not use heat to painful areas for 6 to 8 hours. This will give the local anesthetic time to wear off and prevent you from accidentally burning your skin.     If you experience any of the following, call the pain center nursing line during work hours at [phone] or the after hours provider line at [phone]:  -Fever over 100 degree F  -Swelling, bleeding, redness, drainage, warmth at the injection site  -Progressive weakness or numbness in your legs  -If lumbar, call if you have a loss of bowel or bladder function  -Unusual new onset of  pain that is not improving

## 2018-02-12 NOTE — NURSING NOTE
Discharge Information    IV Discontiued Time:  NA    Amount of Fluid Infused:  NA    Discharge Criteria = When patient returns to baseline or as per MD order    Consciousness:  Pt is fully awake    Circulation:  BP +/- 20% of pre-procedure level    Respiration:  Patient is able to breathe deeply    O2 Sat:  Patient is able to maintain O2 Sat >92% on room air    Activity:  Moves 4 extremities on command    Ambulation:  Patient is able to stand and walk or stand and pivot into wheelchair    Dressing:  Clean/dry or No Dressing    Notes:   Discharge instructions and AVS given to patient    Patient meets criteria for discharge?  YES    Admitted to PCU?  No    Responsible adult present to accompany patient home?  Yes    Signature/Title:    Donna Deshpande RN Care Coordinator  [facility]

## 2018-02-13 ENCOUNTER — TELEPHONE (OUTPATIENT)
Dept: PALLIATIVE MEDICINE | Facility: CLINIC | Age: 43
End: 2018-02-13

## 2018-02-13 NOTE — TELEPHONE ENCOUNTER
VM left today at: 2:10 pm    Pt calling to talk about extreme pain after procedure.   States pain is 8/10.       Ph. [phone]         Liliana ANTHONY    [facility]

## 2018-02-14 NOTE — TELEPHONE ENCOUNTER
Pt had Bilateral L3,4,5 medial branch blocks #1 on 2/12.      Called pt and LM that I was returning his call. LM that it is not out of the ordinary for a patient's pain to flare after an injection and that I hoped the pain had settled down.  LM suggesting that he review the AVS for what to expect after the injection and to call the clinic if he was still having issues.     JAVON CalabreseN, RN-BC  Patient Care Supervisor/Care Coordinator  [facility]

## 2018-02-20 ENCOUNTER — TELEPHONE (OUTPATIENT)
Dept: PALLIATIVE MEDICINE | Facility: CLINIC | Age: 43
End: 2018-02-20

## 2018-02-20 NOTE — TELEPHONE ENCOUNTER
Patient had a  Bilateral L3,4,5 medial branch blocks #1 on 2/12/18.  Called patient for an update.      Left message that we were calling for an update about how he was doing after the injection, and reminder to send in pain diary.  LM that if he has any problems or questions to call the nurse line at [phone].

## 2018-02-21 ENCOUNTER — TELEPHONE (OUTPATIENT)
Dept: SLEEP MEDICINE | Facility: CLINIC | Age: 43
End: 2018-02-21

## 2018-02-21 DIAGNOSIS — G47.33 OSA (OBSTRUCTIVE SLEEP APNEA): ICD-10-CM

## 2018-02-21 DIAGNOSIS — F51.04 PSYCHOPHYSIOLOGICAL INSOMNIA: ICD-10-CM

## 2018-02-21 NOTE — TELEPHONE ENCOUNTER
I called patient today to follow up and see if he would like to schedule an appointment to come in for a mask fitting. No answer left message.

## 2018-02-23 ENCOUNTER — TRANSFERRED RECORDS (OUTPATIENT)
Dept: HEALTH INFORMATION MANAGEMENT | Facility: CLINIC | Age: 43
End: 2018-02-23

## 2018-02-25 ENCOUNTER — MYC MEDICAL ADVICE (OUTPATIENT)
Dept: PALLIATIVE MEDICINE | Facility: CLINIC | Age: 43
End: 2018-02-25

## 2018-02-26 ENCOUNTER — THERAPY VISIT (OUTPATIENT)
Dept: PHYSICAL THERAPY | Facility: CLINIC | Age: 43
End: 2018-02-26
Payer: COMMERCIAL

## 2018-02-26 ENCOUNTER — MYC REFILL (OUTPATIENT)
Dept: FAMILY MEDICINE | Facility: CLINIC | Age: 43
End: 2018-02-26

## 2018-02-26 DIAGNOSIS — M47.819 FACET ARTHROPATHY: ICD-10-CM

## 2018-02-26 DIAGNOSIS — M26.609 TEMPORAL MANDIBULAR JOINT DISORDER: Primary | ICD-10-CM

## 2018-02-26 PROCEDURE — 97140 MANUAL THERAPY 1/> REGIONS: CPT | Mod: GP | Performed by: PHYSICAL THERAPIST

## 2018-02-26 PROCEDURE — 97110 THERAPEUTIC EXERCISES: CPT | Mod: GP | Performed by: PHYSICAL THERAPIST

## 2018-02-26 RX ORDER — OXYCODONE HYDROCHLORIDE 5 MG/1
5-10 TABLET ORAL EVERY 6 HOURS PRN
Qty: 35 TABLET | Refills: 0 | Status: CANCELLED | OUTPATIENT
Start: 2018-02-26

## 2018-02-26 NOTE — TELEPHONE ENCOUNTER
"Message from [employer]:  Original authorizing provider: MD Tito Dickey would like a refill of the following medications:  oxyCODONE IR (ROXICODONE) 5 MG tablet [Ksenia Lyles MD]    Preferred pharmacy: [facility] desk    Comment:  Hi Dr. Lyles, Unfortunately I need to request an Oxycodone refill sooner than expected. Background: I had a lumbar spine pre-RFA \"insurance\" procedure on Monday 02/12, which caused significant pain. Additionally, I had a \"slip and fall\" on my icy driveway on Friday, 02/23 which caused very intense pain that lasted for several days. The pain related to TMJ dysfunction has finally improved with PT. Thanks, Tito [phone]  "

## 2018-02-27 ENCOUNTER — OFFICE VISIT (OUTPATIENT)
Dept: FAMILY MEDICINE | Facility: CLINIC | Age: 43
End: 2018-02-27
Payer: COMMERCIAL

## 2018-02-27 VITALS
BODY MASS INDEX: 37.19 KG/M2 | RESPIRATION RATE: 20 BRPM | WEIGHT: 315 LBS | HEIGHT: 77 IN | HEART RATE: 84 BPM | SYSTOLIC BLOOD PRESSURE: 110 MMHG | DIASTOLIC BLOOD PRESSURE: 80 MMHG | OXYGEN SATURATION: 97 % | TEMPERATURE: 98.4 F

## 2018-02-27 DIAGNOSIS — M47.819 FACET ARTHROPATHY: ICD-10-CM

## 2018-02-27 PROCEDURE — 99213 OFFICE O/P EST LOW 20 MIN: CPT | Performed by: FAMILY MEDICINE

## 2018-02-27 RX ORDER — OXYCODONE HYDROCHLORIDE 5 MG/1
5-10 TABLET ORAL EVERY 6 HOURS PRN
Qty: 35 TABLET | Refills: 0 | Status: SHIPPED | OUTPATIENT
Start: 2018-02-27 | End: 2018-03-29

## 2018-02-27 RX ORDER — ALPRAZOLAM 0.5 MG/1
0.5 TABLET, EXTENDED RELEASE ORAL
COMMUNITY
Start: 2018-02-08 | End: 2018-03-19

## 2018-02-27 RX ORDER — HYDROXYZINE HYDROCHLORIDE 50 MG/1
50 TABLET, FILM COATED ORAL DAILY
COMMUNITY
End: 2018-11-20

## 2018-02-27 NOTE — PROGRESS NOTES
SUBJECTIVE:   Joel Pineda is a 42 year old male who presents to clinic today for the following health issues:      Chronic Pain Follow-Up       Type / Location of Pain: SI joints  Analgesia/pain control:       Recent changes:  worse      Overall control: Tolerable with discomfort  Activity level/function:      Daily activities:  Able to do light housework, cooking    Work:  not applicable  Adverse effects:  No  Adherance    Taking medication as directed?  Yes    Participating in other treatments: yes  Risk Factors:    Sleep:  Good    Mood/anxiety:  controlled    Recent family or social stressors:  none noted    Other aggravating factors: getting out of bed, repetitive movement  PHQ-9 SCORE 2/2/2017 8/16/2017 1/29/2018   Total Score - - -   Total Score MyChart - - -   Total Score 14 16 14   Total Score - - -     YUDI-7 SCORE 2/2/2017 8/16/2017 1/29/2018   Total Score - - -   Total Score - - -   Total Score 15 11 14   Total Score BEH Adult - - -     Encounter-Level CSA - 04/04/2017:          Controlled Substance Agreement - Scan on 4/11/2017  1:30 PM : CONTROLLED SUBSTANCE AGREEMENT (below)                Amount of exercise or physical activity: None    Problems taking medications regularly: No    Medication side effects: none    Diet: regular (no restrictions)    SUBJECTIVE:  Here today in follow-up of chronic back pain.  Well-known to me and he has undergone multiple procedures and is working with the pain clinic to keep his known back issues under control.  Rare use of intermittent oxycodone.  Interval history significant for a fall last week and he wound up being seen through [facility].  There an x-ray showed some bridging in his right SI joint suggestive of chronic sacroiliitis.  He has an appointment with Dr. Baxter tomorrow and he is wondering if this is a piece of the puzzle in diagnosing ankylosing spondylitis.  We had a discussion about the condition in general terms and I would defer definitive diagnosis  "to Dr. Baxter.    Review of systems otherwise negative.  Past medical, family, and social history reviewed and updated in chart.    OBJECTIVE:  /80 (BP Location: Right arm, Patient Position: Sitting, Cuff Size: Adult Large)  Pulse 84  Temp 98.4  F (36.9  C) (Oral)  Resp 20  Ht 1.956 m (6' 5\")  Wt (!) 146.9 kg (323 lb 14.4 oz)  SpO2 97%  BMI 38.41 kg/m2  Alert, pleasant, upbeat, and in no apparent discomfort.  S1 and S2 normal, no murmurs, clicks, gallops or rubs. Regular rate and rhythm. Chest is clear; no wheezes or rales. No edema or JVD.  Past labs reviewed with the patient.     ASSESSMENT / PLAN:  (M12.88) Facet arthropathy  Comment: Will refill low-dose oxycodone for intermittent use.  Plan: oxyCODONE IR (ROXICODONE) 5 MG tablet    He will speak with Dr. Baxter tomorrow but the possibility of ankylosing spondylitis          Follow up as needed otherwise -six-month maximum  S. Shady Lyles MD    (Chart documentation completed in part with Dragon voice-recognition software.  Even though reviewed some grammatical, spelling, and word errors may remain.)       "

## 2018-02-27 NOTE — MR AVS SNAPSHOT
After Visit Summary   2/27/2018    Joel Pineda    MRN: [id number]           Patient Information     Date Of Birth          1975        Visit Information        Provider Department      2/27/2018 3:00 PM Ksenia Lyles MD [facility]        Today's Diagnoses     Facet arthropathy           Follow-ups after your visit        Follow-up notes from your care team     Return in about 6 months (around 8/27/2018).      Your next 10 appointments already scheduled     Feb 28, 2018 11:00 AM CST   MyChart Rheumatology Return with Oneil Baxter MD   [facility] ([facility])    [address] [facility] [address]   [phone]            Mar 12, 2018  3:00 PM CDT   LAB with LAB FIRST FLOOR Critical access hospital ([facility])    [address] [place] [address] [phone]           Please do not eat 10-12 hours before your appointment if you are coming in fasting for labs on lipids, cholesterol, or glucose (sugar). This does not apply to pregnant women. Water, hot tea and black coffee (with nothing added) are okay. Do not drink other fluids, diet soda or chew gum.            Mar 12, 2018  3:30 PM CDT   New Visit with Kimberly Douglas MD   [facility] ([facility]    [address] [place] [address] [phone]            Mar 19, 2018  3:00 PM CDT   TELEMEDICINE with Radha Mcdonald [facility] ([facility]    [address] [place] [address] [address]  [address]  [facility] [address] [phone]           Note: this is not an onsite visit; there is no need to come to the facility.            Mar 26, 2018  1:50 PM CDT   [facility] Up Treatment with Shady Doherty, PT   [facility]    [address]  [address]  [facility] [address]   [phone]            "If you have your physician's order in hand, please bring it with you to your appointment              Who to contact     If you have questions or need follow up information about today's clinic visit or your schedule please contact [facility] directly at [phone].  Normal or non-critical lab and imaging results will be communicated to you by MyChart, letter or phone within 4 business days after the clinic has received the results. If you do not hear from us within 7 days, please contact the clinic through [employer] or phone. If you have a critical or abnormal lab result, we will notify you by phone as soon as possible.  Submit refill requests through [employer] or call your pharmacy and they will forward the refill request to us. Please allow 3 business days for your refill to be completed.          Additional Information About Your Visit        MyChart Information     [employer] gives you secure access to your electronic health record. If you see a primary care provider, you can also send messages to your care team and make appointments. If you have questions, please call your primary care clinic.  If you do not have a primary care provider, please call [phone] and they will assist you.        Care EveryWhere ID     This is your Care EveryWhere ID. This could be used by other organizations to access your [place] medical records  [id number]        Your Vitals Were     Pulse Temperature Respirations Height Pulse Oximetry BMI (Body Mass Index)    84 98.4  F (36.9  C) (Oral) 20 1.956 m (6' 5\") 97% 38.41 kg/m2       Blood Pressure from Last 3 Encounters:   02/27/18 110/80   02/12/18 (!) 133/92   01/24/18 116/78    Weight from Last 3 Encounters:   02/27/18 (!) 146.9 kg (323 lb 14.4 oz)   01/24/18 (!) 141.3 kg (311 lb 8 oz)   09/06/17 (!) 140.9 kg (310 lb 11.2 oz)              Today, you had the following     No orders found for display         Today's Medication Changes          These changes are " accurate as of 2/27/18  3:21 PM.  If you have any questions, ask your nurse or doctor.               These medicines have changed or have updated prescriptions.        Dose/Directions    cetirizine 10 MG tablet   Commonly known as:  zyrTEC   This may have changed:  when to take this   Used for:  Cough        Dose:  10 mg   Take 1 tablet (10 mg) by mouth At Bedtime   Quantity:  30 tablet   Refills:  11            Where to get your medicines      Some of these will need a paper prescription and others can be bought over the counter.  Ask your nurse if you have questions.     Bring a paper prescription for each of these medications     oxyCODONE IR 5 MG tablet                Primary Care Provider Office Phone # Fax #    Ksenia Shady Lyles [phone] [address] [facility] [address]        Goals        General    I will continue to attend Psychiatry appointments for medication management, 1/14/2014 (pt-stated)     Notes - Note edited  8/24/2016  4:04 PM by Dalila Cavazos LSW    As of today's date 8/24/2016 goal is met at 51 - 75%.   Goal Status:  Ongoing Sees  Therapist every other week and Psych PA every 3 weeks.  As of today's date 5/25/2016 goal is met at 51 - 75%.   Goal Status:  Showing progress-  Meeting with Psych PA for second time tomorrow  To review meds As of today's date 4/11/2016 goal is met at 51 - 75%.   Goal Status:  Showing progress  As of today's date 1/14/2014 goal is met at 51 - 75%.   Goal Status:  Active        I will schedule therapy with new counselor, 1/14/2014 (pt-stated)     Notes - Note edited  5/25/2016  1:54 PM by Dalila Cavazos LSW    As of today's date 5/25/2016 goal is met at 76 - 100%.   Goal Status:  Ongoing New therapist and Psych PA on a regular basis  As of today's date 5/10/2016 goal is met at 76 - 100%.   Goal Status:  Ongoing met with new therapist at [facility]  As of today's date 4/11/2016 goal is met at 51 - 75%.   Goal Status:   Ongoing meets with therapist regularly  As of today's date 1/14/2014 goal is met at 0 - 25%.   Goal Status:  Active        Psychosocial I need some help with cleaning (pt-stated)     Notes - Note edited  6/23/2016  1:57 PM by Dalila Cavazos LSW    As of today's date 6/23/2016 goal is met at 26 - 50%.   Goal Status:  Showing progress met with [facility]. Has not yet made a decision  As of today's date 5/25/2016 goal is met at 0 - 25%.   Goal Status:  Ongoing agency had to reschedule appt.  As of today's date 5/10/2016 goal is met at 0 - 25%.   Goal Status:  Active referral to [facility]        Equal Access to Services     ANNA CANO : Hadii floyd rosa Soisaac, waaxda lukeyuradaha, qaybta kaalmada adeegyada, isabell echevarria. So [facility] [phone].    ATENCIÓN: Si habla español, tiene a faustin disposición servicios gratuitos de asistencia lingüística. Llame al [phone].    We comply with applicable federal civil rights laws and [place] laws. We do not discriminate on the basis of race, color, national origin, age, disability, sex, sexual orientation, or gender identity.            Thank you!     Thank you for choosing [facility]  for your care. Our goal is always to provide you with excellent care. Hearing back from our patients is one way we can continue to improve our services. Please take a few minutes to complete the written survey that you may receive in the mail after your visit with us. Thank you!             Your Updated Medication List - Protect others around you: Learn how to safely use, store and throw away your medicines at www.disposemymeds.org.          This list is accurate as of 2/27/18  3:21 PM.  Always use your most recent med list.                   Brand Name Dispense Instructions for use Diagnosis    acetaminophen 500 MG Caps     60 capsule    Take 500 mg by mouth every 4 hours as needed        albuterol 108 (90 BASE) MCG/ACT Inhaler    PROAIR  HFA/PROVENTIL HFA/VENTOLIN HFA     Inhale 2 puffs into the lungs every 4 hours as needed        * ALPRAZolam 1 MG 24 hr tablet    XANAX XR     Take 1 mg by mouth every morning        * ALPRAZolam 0.5 MG 24 hr tablet    XANAX XR     Take 0.5 mg by mouth        atorvastatin 40 MG tablet    LIPITOR    90 tablet    TAKE 1 TABLET (40 MG) BY MOUTH DAILY    Mixed hyperlipidemia       benztropine 1 MG tablet    COGENTIN     Take 1 mg by mouth 2 times daily Tasking in PM        celecoxib 200 MG capsule    celeBREX    180 capsule    TAKE 1 CAPSULE BY MOUTH TWICE DAILY AS NEEDED FORMODERATE PAIN    Chronic midline low back pain without sciatica       cetirizine 10 MG tablet    zyrTEC    30 tablet    Take 1 tablet (10 mg) by mouth At Bedtime    Cough       cyanocobalamin 1000 MCG/ML injection    VITAMIN B12    10 mL    Inject 1 mL (1,000 mcg) into the muscle every 30 days    B12 deficiency       DULoxetine 30 MG EC capsule    CYMBALTA     Take 90 mg by mouth daily        EPINEPHrine 0.3 MG/0.3ML injection 2-pack    EPIPEN/ADRENACLICK/or ANY BX GENERIC EQUIV    0.6 mL    Inject 0.3 mLs (0.3 mg) into the muscle once as needed for anaphylaxis    Food allergy       fenofibrate 145 MG tablet     90 tablet    Take 1 tablet (145 mg) by mouth daily    Mixed hyperlipidemia       hydrOXYzine 50 MG tablet    ATARAX     Take 50 mg by mouth daily        lamoTRIgine 25 MG tablet    LaMICtal     Take 100 mg by mouth daily 200mg daily        levothyroxine 75 MCG tablet    SYNTHROID/LEVOTHROID    90 tablet    Take 1 tablet (75 mcg) by mouth every morning    Acquired hypothyroidism       lidocaine 5 % ointment    XYLOCAINE    250 g    Apply topically 3 times daily as needed for moderate pain Apply as directed    Chronic midline low back pain without sciatica       loperamide 2 MG capsule    IMODIUM     Take 2 mg by mouth 4 times daily as needed for diarrhea        metoprolol succinate 200 MG 24 hr tablet    TOPROL-XL     Take 200 mg by mouth  daily        omeprazole 20 MG tablet      Take 20 mg by mouth daily        ondansetron 8 MG tablet    ZOFRAN    20 tablet    TAKE 1 TABLET (8 MG) BY MOUTH EVERY 8 HOURS AS NEEDED FOR NAUSEA/VOMITING.    Nausea       * order for DME     12 each    Equipment being ordered: 1 ml tuberculin syringes to be used for Vitamin B12 injections.    Vitamin B12 deficiency (non anaemic)       * order for DME     1 Units    SI-loc belt    SI (sacroiliac) joint dysfunction       * order for DME      Respironics REMSTAR 60 Series Auto CPAP 10-12 cm H2O, Wisp nasal mask w/a large cushion and a chinstrap        oxyCODONE IR 5 MG tablet    ROXICODONE    35 tablet    Take 1-2 tablets (5-10 mg) by mouth every 6 hours as needed for pain (maximum 4 tablet(s) per day)    Facet arthropathy       pregabalin 75 MG capsule    LYRICA    60 capsule    Take 1 capsule (75 mg) by mouth 2 times daily    Chronic midline low back pain without sciatica       ramipril 5 MG capsule    ALTACE    90 capsule    TAKE 1 CAPSULE BY MOUTH DAILY    Hypertension goal BP (blood pressure) < 140/90       risperiDONE 1 MG tablet    risperDAL          tiZANidine 4 MG tablet    ZANAFLEX    90 tablet    Take 1 tablet (4 mg) by mouth 3 times daily as needed for muscle spasms    Dorsalgia       vitamin D3 10189 UNITS capsule    CHOLECALCIFEROL    24 capsule    Take one capsule every two weeks.    Vitamin D deficiency       * Notice:  This list has 5 medication(s) that are the same as other medications prescribed for you. Read the directions carefully, and ask your doctor or other care provider to review them with you.

## 2018-02-27 NOTE — Clinical Note
Oneil, Just a heads up on Tito, whom you're seeing tomorrow.  Xray at [facility] showed some right SI bridging and he's wondering if this meets criteria for AS.  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 Odell

## 2018-02-27 NOTE — NURSING NOTE
"Chief Complaint   Patient presents with     Refill Request       Initial /80 (BP Location: Right arm, Patient Position: Sitting, Cuff Size: Adult Large)  Pulse 84  Temp 98.4  F (36.9  C) (Oral)  Resp 20  Ht 1.956 m (6' 5\")  Wt (!) 146.9 kg (323 lb 14.4 oz)  SpO2 97%  BMI 38.41 kg/m2 Estimated body mass index is 38.41 kg/(m^2) as calculated from the following:    Height as of this encounter: 1.956 m (6' 5\").    Weight as of this encounter: 146.9 kg (323 lb 14.4 oz).  Medication Reconciliation: russell Wright        "

## 2018-02-28 ENCOUNTER — RADIANT APPOINTMENT (OUTPATIENT)
Dept: GENERAL RADIOLOGY | Facility: CLINIC | Age: 43
End: 2018-02-28
Attending: INTERNAL MEDICINE
Payer: COMMERCIAL

## 2018-02-28 ENCOUNTER — OFFICE VISIT (OUTPATIENT)
Dept: RHEUMATOLOGY | Facility: CLINIC | Age: 43
End: 2018-02-28
Payer: COMMERCIAL

## 2018-02-28 VITALS
WEIGHT: 315 LBS | HEIGHT: 77 IN | HEART RATE: 86 BPM | BODY MASS INDEX: 37.19 KG/M2 | SYSTOLIC BLOOD PRESSURE: 135 MMHG | DIASTOLIC BLOOD PRESSURE: 88 MMHG | OXYGEN SATURATION: 97 %

## 2018-02-28 DIAGNOSIS — M45.8 ANKYLOSING SPONDYLITIS OF SACRAL REGION (H): ICD-10-CM

## 2018-02-28 DIAGNOSIS — M45.8 ANKYLOSING SPONDYLITIS OF SACRAL REGION (H): Primary | ICD-10-CM

## 2018-02-28 PROCEDURE — 86803 HEPATITIS C AB TEST: CPT | Performed by: INTERNAL MEDICINE

## 2018-02-28 PROCEDURE — 86480 TB TEST CELL IMMUN MEASURE: CPT | Performed by: INTERNAL MEDICINE

## 2018-02-28 PROCEDURE — 71046 X-RAY EXAM CHEST 2 VIEWS: CPT

## 2018-02-28 PROCEDURE — 87340 HEPATITIS B SURFACE AG IA: CPT | Performed by: INTERNAL MEDICINE

## 2018-02-28 PROCEDURE — 86704 HEP B CORE ANTIBODY TOTAL: CPT | Performed by: INTERNAL MEDICINE

## 2018-02-28 PROCEDURE — 99214 OFFICE O/P EST MOD 30 MIN: CPT | Performed by: INTERNAL MEDICINE

## 2018-02-28 PROCEDURE — 36415 COLL VENOUS BLD VENIPUNCTURE: CPT | Performed by: INTERNAL MEDICINE

## 2018-02-28 PROCEDURE — 72200 X-RAY EXAM SI JOINTS: CPT

## 2018-02-28 NOTE — MR AVS SNAPSHOT
After Visit Summary   2/28/2018    Joel Pnieda    MRN: [id number]           Patient Information     Date Of Birth          1975        Visit Information        Provider Department      2/28/2018 11:00 AM Oneil Baxter MD [facility]        Today's Diagnoses     Ankylosing spondylitis of sacral region (H)    -  1       Follow-ups after your visit        Your next 10 appointments already scheduled     Mar 12, 2018  3:00 PM CDT   LAB with LAB FIRST FLOOR [facility] ([facility])    [address] [place] [address]   [phone]           Please do not eat 10-12 hours before your appointment if you are coming in fasting for labs on lipids, cholesterol, or glucose (sugar). This does not apply to pregnant women. Water, hot tea and black coffee (with nothing added) are okay. Do not drink other fluids, diet soda or chew gum.            Mar 12, 2018  3:30 PM CDT   New Visit with Kimberly Douglas MD   [facility]    [address] [place] [address]   [phone]            Mar 19, 2018  3:00 PM CDT   TELEMEDICINE with Radha Mcdonald [facility]    [address] [place] [address] [address]  [address]  [facility] [address] [phone]           Note: this is not an onsite visit; there is no need to come to the facility.            Mar 26, 2018  1:50 PM CDT   MyChart Physical Therapy TMJ Follow Up Treatment with Shady Doherty, PT   [facility]    [address] [facility] [facility]  [address]  [place] [id number] [phone]           If you have your physician's order in hand, please bring it with you to your appointment              Future tests that were ordered for you today     Open Future Orders        Priority Expected Expires Ordered    XR Chest 2 Views Routine 2/28/2018 2/28/2019 2/28/2018    XR  "Sacroiliac Joint 1/2 Views Routine 2/28/2018 2/28/2019 2/28/2018            Who to contact     If you have questions or need follow up information about today's clinic visit or your schedule please contact [facility] directly at [phone].  Normal or non-critical lab and imaging results will be communicated to you by MyChart, letter or phone within 4 business days after the clinic has received the results. If you do not hear from us within 7 days, please contact the clinic through [employer] or phone. If you have a critical or abnormal lab result, we will notify you by phone as soon as possible.  Submit refill requests through [employer] or call your pharmacy and they will forward the refill request to us. Please allow 3 business days for your refill to be completed.          Additional Information About Your Visit        MyChart Information     [employer] gives you secure access to your electronic health record. If you see a primary care provider, you can also send messages to your care team and make appointments. If you have questions, please call your primary care clinic.  If you do not have a primary care provider, please call [phone] and they will assist you.        Care EveryWhere ID     This is your Care EveryWhere ID. This could be used by other organizations to access your [place] medical records  [id number]        Your Vitals Were     Pulse Height Pulse Oximetry BMI (Body Mass Index)          86 6' 5\" (1.956 m) 97% 38.63 kg/m2         Blood Pressure from Last 3 Encounters:   02/28/18 135/88   02/27/18 110/80   02/12/18 (!) 133/92    Weight from Last 3 Encounters:   02/28/18 (!) 325 lb 12.8 oz (147.8 kg)   02/27/18 (!) 323 lb 14.4 oz (146.9 kg)   01/24/18 (!) 311 lb 8 oz (141.3 kg)              We Performed the Following     Hepatitis B core antibody     Hepatitis B surface antigen     Hepatitis C Screen Reflex to HCV RNA Quant and Genotype     M Tuberculosis by Quantiferon          Today's " Medication Changes          These changes are accurate as of 2/28/18 11:45 AM.  If you have any questions, ask your nurse or doctor.               These medicines have changed or have updated prescriptions.        Dose/Directions    cetirizine 10 MG tablet   Commonly known as:  zyrTEC   This may have changed:  when to take this   Used for:  Cough        Dose:  10 mg   Take 1 tablet (10 mg) by mouth At Bedtime   Quantity:  30 tablet   Refills:  11                Primary Care Provider Office Phone # Fax #    Ksenia Shady Lyles [phone] [address] [facility] [address]        Goals        General    I will continue to attend Psychiatry appointments for medication management, 1/14/2014 (pt-stated)     Notes - Note edited  8/24/2016  4:04 PM by Dalila Cavazos LSW    As of today's date 8/24/2016 goal is met at 51 - 75%.   Goal Status:  Ongoing Sees  Therapist every other week and Psych PA every 3 weeks.  As of today's date 5/25/2016 goal is met at 51 - 75%.   Goal Status:  Showing progress-  Meeting with Psych PA for second time tomorrow  To review meds As of today's date 4/11/2016 goal is met at 51 - 75%.   Goal Status:  Showing progress  As of today's date 1/14/2014 goal is met at 51 - 75%.   Goal Status:  Active        I will schedule therapy with new counselor, 1/14/2014 (pt-stated)     Notes - Note edited  5/25/2016  1:54 PM by Dalila Cavazos LSW    As of today's date 5/25/2016 goal is met at 76 - 100%.   Goal Status:  Ongoing New therapist and Psych PA on a regular basis  As of today's date 5/10/2016 goal is met at 76 - 100%.   Goal Status:  Ongoing met with new therapist at [facility]  As of today's date 4/11/2016 goal is met at 51 - 75%.   Goal Status:  Ongoing meets with therapist regularly  As of today's date 1/14/2014 goal is met at 0 - 25%.   Goal Status:  Active        Psychosocial I need some help with cleaning (pt-stated)     Notes - Note edited  6/23/2016   1:57 PM by Dalila Cavazos LSW    As of today's date 6/23/2016 goal is met at 26 - 50%.   Goal Status:  Showing progress met with [facility]. Has not yet made a decision  As of today's date 5/25/2016 goal is met at 0 - 25%.   Goal Status:  Ongoing agency had to reschedule appt.  As of today's date 5/10/2016 goal is met at 0 - 25%.   Goal Status:  Active referral to [facility]        Equal Access to Services     ANNA CANO : Hadii aad ku hadasho Soomaali, waaxda luqadaha, qaybta kaalmada adeegyada, waxay idiin haymagnolian adeeg khesthela shankar . So [facility] [phone].    ATENCIÓN: Si habla español, tiene a faustin disposición servicios gratuitos de asistencia lingüística. Llame al [phone].    We comply with applicable federal civil rights laws and [place] laws. We do not discriminate on the basis of race, color, national origin, age, disability, sex, sexual orientation, or gender identity.            Thank you!     [facility] you [facility]  [facility] your [facility]. Our goal is always to provide you with excellent care. Hearing back from our patients is one way we can continue to improve our services. Please take a few minutes to complete the written survey that you may receive in the mail after your visit with us. Thank you!             Your Updated Medication List - Protect others around you: Learn how to safely use, store and throw away your medicines at www.disposemymeds.org.          This list is accurate as of 2/28/18 11:45 AM.  Always use your most recent med list.                   Brand Name Dispense Instructions for use Diagnosis    acetaminophen 500 MG Caps     60 capsule    Take 500 mg by mouth every 4 hours as needed        albuterol 108 (90 BASE) MCG/ACT Inhaler    PROAIR HFA/PROVENTIL HFA/VENTOLIN HFA     Inhale 2 puffs into the lungs every 4 hours as needed        * ALPRAZolam 1 MG 24 hr tablet    XANAX XR     Take 1 mg by mouth every morning        * ALPRAZolam 0.5 MG 24 hr tablet    XANAX  XR     Take 0.5 mg by mouth        atorvastatin 40 MG tablet    LIPITOR    90 tablet    TAKE 1 TABLET (40 MG) BY MOUTH DAILY    Mixed hyperlipidemia       benztropine 1 MG tablet    COGENTIN     Take 1 mg by mouth 2 times daily Tasking in PM        celecoxib 200 MG capsule    celeBREX    180 capsule    TAKE 1 CAPSULE BY MOUTH TWICE DAILY AS NEEDED FORMODERATE PAIN    Chronic midline low back pain without sciatica       cetirizine 10 MG tablet    zyrTEC    30 tablet    Take 1 tablet (10 mg) by mouth At Bedtime    Cough       cyanocobalamin 1000 MCG/ML injection    VITAMIN B12    10 mL    Inject 1 mL (1,000 mcg) into the muscle every 30 days    B12 deficiency       DULoxetine 30 MG EC capsule    CYMBALTA     Take 90 mg by mouth daily        EPINEPHrine 0.3 MG/0.3ML injection 2-pack    EPIPEN/ADRENACLICK/or ANY BX GENERIC EQUIV    0.6 mL    Inject 0.3 mLs (0.3 mg) into the muscle once as needed for anaphylaxis    Food allergy       fenofibrate 145 MG tablet     90 tablet    Take 1 tablet (145 mg) by mouth daily    Mixed hyperlipidemia       FISH OIL PO           hydrOXYzine 50 MG tablet    ATARAX     Take 50 mg by mouth daily        lamoTRIgine 25 MG tablet    LaMICtal     Take 100 mg by mouth daily 200mg daily        levothyroxine 75 MCG tablet    SYNTHROID/LEVOTHROID    90 tablet    Take 1 tablet (75 mcg) by mouth every morning    Acquired hypothyroidism       lidocaine 5 % ointment    XYLOCAINE    250 g    Apply topically 3 times daily as needed for moderate pain Apply as directed    Chronic midline low back pain without sciatica       loperamide 2 MG capsule    IMODIUM     Take 2 mg by mouth 4 times daily as needed for diarrhea        metoprolol succinate 200 MG 24 hr tablet    TOPROL-XL     Take 200 mg by mouth daily        omeprazole 20 MG tablet      Take 20 mg by mouth daily        ondansetron 8 MG tablet    ZOFRAN    20 tablet    TAKE 1 TABLET (8 MG) BY MOUTH EVERY 8 HOURS AS NEEDED FOR NAUSEA/VOMITING.     Nausea       * order for DME     12 each    Equipment being ordered: 1 ml tuberculin syringes to be used for Vitamin B12 injections.    Vitamin B12 deficiency (non anaemic)       * order for DME     1 Units    SI-loc belt    SI (sacroiliac) joint dysfunction       * order for DME      Respironics REMSTAR 60 Series Auto CPAP 10-12 cm H2O, Wisp nasal mask w/a large cushion and a chinstrap        oxyCODONE IR 5 MG tablet    ROXICODONE    35 tablet    Take 1-2 tablets (5-10 mg) by mouth every 6 hours as needed for pain (maximum 4 tablet(s) per day)    Facet arthropathy       pregabalin 75 MG capsule    LYRICA    60 capsule    Take 1 capsule (75 mg) by mouth 2 times daily    Chronic midline low back pain without sciatica       ramipril 5 MG capsule    ALTACE    90 capsule    TAKE 1 CAPSULE BY MOUTH DAILY    Hypertension goal BP (blood pressure) < 140/90       risperiDONE 1 MG tablet    risperDAL          tiZANidine 4 MG tablet    ZANAFLEX    90 tablet    Take 1 tablet (4 mg) by mouth 3 times daily as needed for muscle spasms    Dorsalgia       vitamin D3 81606 UNITS capsule    CHOLECALCIFEROL    24 capsule    Take one capsule every two weeks.    Vitamin D deficiency       * Notice:  This list has 5 medication(s) that are the same as other medications prescribed for you. Read the directions carefully, and ask your doctor or other care provider to review them with you.

## 2018-02-28 NOTE — NURSING NOTE
"Chief Complaint   Patient presents with     Consult     was in [facility] urgent care on Friday. Patient has chronic pain.       Initial /88 (BP Location: Left arm, Patient Position: Chair, Cuff Size: Adult Large)  Pulse 86  Ht 1.956 m (6' 5\")  Wt (!) 147.8 kg (325 lb 12.8 oz)  SpO2 97%  BMI 38.63 kg/m2 Estimated body mass index is 38.63 kg/(m^2) as calculated from the following:    Height as of this encounter: 1.956 m (6' 5\").    Weight as of this encounter: 147.8 kg (325 lb 12.8 oz).  BP completed using cuff size: large         RAPID3 (0-30) Cumulative Score  19.7          RAPID3 Weighted Score (divide #4 by 3 and that is the weighted score)  6.56         "

## 2018-02-28 NOTE — PROGRESS NOTES
"Rheumatology Clinic Visit      Joel Pineda MRN# [id number]   YOB: 1975 Age: 42 year old      Date of visit: 2/28/18   PCP: Dr. Ksenia Lyles    Chief Complaint   Patient presents with:  Consult: was in [facility] urgent care on Friday. Patient has chronic pain. Held all pain medication today.       Assessment and Plan     1.  Ankylosing spondylitis: Many years of inflammatory back pain but no clear sacroiliitis seen on x-rays or MRIs; then had a lumbar spine x-ray on 2/23/2018 at [facility] showing \"Moderate L5-S1 and mild L4-5 degenerative disc disease and degenerative facet arthropathy. No evidence for fracture, subluxation, or spondylolisthesis. Chronic bridging enthesophyte across the lower right SI joint with irregularity of the joint space suspicious for sequelae of chronic inflammatory sacroiliitis. Correlate clinically.\"  This is complicated by a history of back surgery and degenerative changes.  HLA-B27 positive.  He has a personal history of diverticulitis requiring sigmoidoscopy.  Reportedly his mother has ulcerative colitis.  We reviewed the additional workup that could be done, primarily using an MRI.  Because of reported contrast-induced nephropathy would avoid additional contrast for the MRI; so if there was no sacroiliitis identified on the MRI and it would either be due to a lack of contrast or possibly non-radiographic sacroiliitis.  Therefore, based on his symptoms, the x-ray that was seen, and HLA-B27 positive, it is most likely ankylosing spondylitis and a trial of Remicade was discussed.  He would like to start Remicade rather than a self injection because of cost.  He verbalized understanding that tx of AS may not alleviate all his pain as he also has degenerative spine disease and is s/p surgery of the L-spine.   - X-rays today: Chest, SI joints  - Labs today: Hepatitis B/C, Quantiferon TB  - Start remicade 5mg/kg IV if labs and x-rays are okay    # Infliximab (Remicade) Risks and " Benefits: The risks and benefits of infliximab were discussed in detail and the patient verbalized understanding.  The risks discussed include, but are not limited to, the risk for hypersensitivity, anaphylaxis, anaphylactoid reactions, an increased risk for serious infections leading to hospitalization or death, a possible increased risk for lymphoma and other malignancies, a possible worsening of demyelinating diseases, a possible worsening of heart failure, cytopenias, hepatotoxicity, risk for drug induced lupus, possible reactivation of hepatitis B, and possible reactivation of latent tuberculosis.   The most common adverse reactions are infections, infusion-related reactions, and headache.  It was discussed that the medication would need to be discontinued if a serious infection develops.  It was discussed that live vaccinations should not be received while using infliximab or within 30 days prior to starting infliximab.  I encouraged reviewing the package insert and asking any questions about the medication.     Mr. Pineda verbalized agreement with and understanding of the rational for the diagnosis and treatment plan.  All questions were answered to best of my ability and the patient's satisfaction. Mr. Pineda was advised to contact the clinic with any questions that may arise after the clinic visit.      Thank you for involving me in the care of the patient    Return to clinic: 3 months      HPI   Joel Pineda is a 42 year old male with a past medical history significant for hypertension, hyperlipidemia, asthma, history of pulmonary embolism, history of diverticulitis requiring sigmoidectomy, GERD, obstructive sleep apnea, bipolar disorder, hypothyroidism, B12 deficiency, and chronic pain syndrome who presents for rheumatology evaluation.    10/6/2015 rheumatology clinic note by Dr. Ross Mcfarlane documents facet arthritis, foraminal stenosis, and disc disease as a cause for his lower back pain.  Planning to  "see neurosurgery for an arachnoid cyst.    Was evaluated at the [facility] on 2/23/2018 by Dr. Geo Greene who documents the patient had a back injury when he slipped on ice.  Taking a cocktail of pain medications including Celebrex, oxycodone, tizanidine, Tylenol.  Previous surgery on L5 and S1.  Back pain and referred right thigh pain.  X-ray lumbar spine showed moderate L5-S1 and mild L4-5 degenerative disc disease and degenerative facet arthropathy.  \"Chronic bridging enthesophyte across the lower right SI joint with irregularity of the joint space suspicious for sequelae of chronic inflammatory sacroiliitis.\"    Today, Mr. Pineda reports that in 10/2007 with acute back pain with nerve involvement that required surgery.  Followed at [facility] pain clinic and grew unhappy with them.  Followed then with Dr. Diaz who was reportedly worried about a rheumatologic etiology for the lower back pain.  Then evaluated by Dr. Mcfarlane who did a lot of studies and determined that he does not have an inflammatory arthritis.  In 8/2017 had a pelvic CT showing an abscess that has since been drained and no longer an issue.  Fell about 1 week ago on the ice and additional imaging raised concern for an inflammatory etiology.     Says that [facility] told him he has CKD stage III that he was told was due to contrast.      He says that he has lumbar and right SI joint pain. Pain is bad all day. Back pain since 24yo. During the day and when active he feels better than when he is inactive. +gelling phenomenon.  Morning stiffness all day; morning stiffness improved with activity but he says that he is not very active because of pain.  Was on prednisone once in the past but that caused more psychological issues so he has not used it recently.    HLA-B27 positive from record review but cannot find the actual lab report.    Denies fevers, chills, nausea, vomiting, constipation, diarrhea. No abdominal pain. No chest " pain/pressure, palpitations, or shortness of breath. No LE swelling. No neck pain. No oral or nasal sores.  No rash. No sicca symptoms. No photosensitivity or photophobia. No eye pain or redness. No history of inflammatory eye disease.  No history of Raynaud's Phenomenon.  No seizure history.  No known renal disorder.      Hx of sigmoidectomy for diverticulitis    Pulmonary emboli a couple years ago; tx'd with warfarin for 6 mo.    Mother: ulcerative colitis    Tobacco: None  EtOH: None  Drugs: None    ROS   GEN: No fevers, chills, night sweats, or weight change  SKIN: No itching, rashes, sores  HEENT: No epistaxis. No oral or nasal ulcers.  CV: No chest pain, pressure, palpitations, or dyspnea on exertion.  PULM: No SOB, wheeze, cough.  GI: No nausea, vomiting, constipation, diarrhea. No blood in stool. No abdominal pain.  : No blood in urine.  MSK: See HPI.  NEURO: No numbness or tingling  EXT: No LE swelling  PSYCH: See HPI    Active Problem List     Patient Active Problem List   Diagnosis     Major depressive disorder, recurrent episode (H)     Intermittent asthma     Hyperlipidemia LDL goal <130     Hypertension goal BP (blood pressure) < 140/90     Chronic nonallergic rhinitis     History of diverticulitis of colon     Obesity (BMI 30-39.9)     Health Care Home     PE (pulmonary embolism)     Diverticulosis     GERD (gastroesophageal reflux disease)     Anxiety     LLOYD (obstructive sleep apnea)- mild (AHI 11)     Intracranial arachnoid cyst     Facet arthritis of cervical region (H)     Acquired hypothyroidism     Bipolar 2 disorder (H)     Allergic rhinitis, unspecified allergic rhinitis type     Chronic midline low back pain without sciatica     Irritable bowel syndrome with diarrhea     B12 deficiency     Impaired glucose tolerance     Essential hypertension with goal blood pressure less than 140/90     Psychophysiological insomnia     Chronic pain syndrome     Past Medical History     Past Medical  History:   Diagnosis Date     Acne      Chest pain     Chest pain, regulated w/BP meds. Clear arteries.     Skin exam, screening for cancer 12/3/2013     Past Surgical History     Past Surgical History:   Procedure Laterality Date     BACK SURGERY  10/07    lumbar discectomy L5-S1     COLONOSCOPY      Note: colonoscopy scheduled with [facility] on Friday, 9/4/15     COSMETIC SURGERY  2012    Nose Exterior - functional     GI SURGERY  August 2013    Sigmoidectomy     HERNIA REPAIR, UMBILICAL  8/23/11    small, Dr. Evan Beavers     INCISION AND DRAINAGE, ABSCESS, COMPLEX  8/23/11    umbilical, Dr. Evan Beavers     LAPAROSCOPIC ASSISTED COLECTOMY LEFT (DESCENDING)  8/15/2013    Procedure: LAPAROSCOPIC ASSISTED COLECTOMY LEFT (DESCENDING);  Laparoscopic Hand Assisted Sigmoid Resection, Mobilization of Splenic Fissure, coloproctoscopy, *Latex Free Room* Anesthesia General with Pain block  ;  Surgeon: Aurora Justice MD;  Location: [facility] OR     NERVE SURGERY  8/18/11    RF ablation @ L3-S1 @ [facility]     RECONSTRUCT NOSE AND SEPTUM (FUNCTIONAL)  10/14/2011    Procedure:RECONSTRUCT NOSE AND SEPTUM (FUNCTIONAL); Functional Septorhinoplasty, Turbinate Reduction, ; Surgeon:CEDRIC CUEVAS; Location:[facility] OR     SINUS SURGERY  10/1/01    ethmoidectomy chronic sinusitis     Allergy     Allergies   Allergen Reactions     Banana Shortness Of Breath     Pt reports organic Banana is okay.      Nitroglycerin Palpitations     Penicillins Anaphylaxis     Provigil [Modafinil] Shortness Of Breath     headache     Ciprofloxacin Palpitations, Other (See Comments) and Rash     dizziness (versus metronidazole taken at same time)     Gadolinium Hives and Itching     Patient was premedicated for the contrast allergy. He did still have a reaction a few hours after injection. Hives and itching. Dr. Gomez told tech to inform pt he should only have contrast again in the future when premedicated and at a hospital. Not at an outpatient facility.       Dulera Other (See Comments)     Hoarse voice     Dye [Contrast Dye] Other (See Comments) and Hives     Moderate flushing, CT contrast     Levaquin Other (See Comments)     tendonitis     Levofloxacin      Tendonitis  Tendonitis     Neurontin [Gabapentin] Hives     Moderate hives     Nortriptyline Hives     Varicella Virus Vaccine Live      Rash     Ciprofloxacin Palpitations     Flagyl [Metronidazole Hcl] Palpitations and Hives     Latex Rash     Metronidazole Palpitations, Other (See Comments) and Rash     dizziness (versus ciprofloxacin taken at same time)     No Clinical Screening - See Comments Rash     Nitrile gloves     Current Medication List     Current Outpatient Prescriptions   Medication Sig     Omega-3 Fatty Acids (FISH OIL PO)      ALPRAZolam (XANAX XR) 0.5 MG 24 hr tablet Take 0.5 mg by mouth     hydrOXYzine (ATARAX) 50 MG tablet Take 50 mg by mouth daily     oxyCODONE IR (ROXICODONE) 5 MG tablet Take 1-2 tablets (5-10 mg) by mouth every 6 hours as needed for pain (maximum 4 tablet(s) per day)     risperiDONE (RISPERDAL) 1 MG tablet      loperamide (IMODIUM) 2 MG capsule Take 2 mg by mouth 4 times daily as needed for diarrhea     levothyroxine (SYNTHROID/LEVOTHROID) 75 MCG tablet Take 1 tablet (75 mcg) by mouth every morning     celecoxib (CELEBREX) 200 MG capsule TAKE 1 CAPSULE BY MOUTH TWICE DAILY AS NEEDED FORMODERATE PAIN     ramipril (ALTACE) 5 MG capsule TAKE 1 CAPSULE BY MOUTH DAILY     fenofibrate 145 MG tablet Take 1 tablet (145 mg) by mouth daily     atorvastatin (LIPITOR) 40 MG tablet TAKE 1 TABLET (40 MG) BY MOUTH DAILY     ondansetron (ZOFRAN) 8 MG tablet TAKE 1 TABLET (8 MG) BY MOUTH EVERY 8 HOURS AS NEEDED FOR NAUSEA/VOMITING.     ALPRAZolam (XANAX XR) 1 MG 24 hr tablet Take 1 mg by mouth every morning     benztropine (COGENTIN) 1 MG tablet Take 1 mg by mouth 2 times daily Tasking in PM     DULoxetine (CYMBALTA) 30 MG EC capsule Take 90 mg by mouth daily     lamoTRIgine (LAMICTAL) 25 MG  tablet Take 100 mg by mouth daily 200mg daily     metoprolol (TOPROL-XL) 200 MG 24 hr tablet Take 200 mg by mouth daily     cyanocobalamin (VITAMIN B12) 1000 MCG/ML injection Inject 1 mL (1,000 mcg) into the muscle every 30 days     pregabalin (LYRICA) 75 MG capsule Take 1 capsule (75 mg) by mouth 2 times daily     vitamin D3 (CHOLECALCIFEROL) 00511 UNITS capsule Take one capsule every two weeks.     tiZANidine (ZANAFLEX) 4 MG tablet Take 1 tablet (4 mg) by mouth 3 times daily as needed for muscle spasms     albuterol (PROAIR HFA/PROVENTIL HFA/VENTOLIN HFA) 108 (90 BASE) MCG/ACT Inhaler Inhale 2 puffs into the lungs every 4 hours as needed     EPINEPHrine 0.3 MG/0.3ML injection Inject 0.3 mLs (0.3 mg) into the muscle once as needed for anaphylaxis     order for DME Respironics REMSTAR 60 Series Auto CPAP 10-12 cm H2O, Wisp nasal mask w/a large cushion and a chinstrap     lidocaine (XYLOCAINE) 5 % ointment Apply topically 3 times daily as needed for moderate pain Apply as directed     acetaminophen 500 MG CAPS Take 500 mg by mouth every 4 hours as needed     cetirizine (ZYRTEC) 10 MG tablet Take 1 tablet (10 mg) by mouth At Bedtime (Patient taking differently: Take 10 mg by mouth daily )     order for DME SI-loc belt     order for DME Equipment being ordered: 1 ml tuberculin syringes to be used for Vitamin B12 injections.     omeprazole 20 MG tablet Take 20 mg by mouth daily      No current facility-administered medications for this visit.          Social History   See HPI    Family History     Family History   Problem Relation Age of Onset     Musculoskeletal Disorder Mother      back     Anxiety Disorder Mother      Colon Polyps Mother      Ulcerative Colitis Mother      and ischemic small intestine, surgery     Hypertension Mother      Breast Cancer Mother      OSTEOPOROSIS Mother      DIABETES Mother      Type 2, Diagnosed in 2014     Depression Mother      Takes Cymbalta to help with chronic pain + depx      "Thyroid Disease Mother      Hypothyroidism     Obesity Mother      Under much better control latter half of 2015     Musculoskeletal Disorder Father      back     Substance Abuse Father      Hypertension Father      Hyperlipidemia Father      Depression Father      Off meds for many years. Seems \"ok\"     HEART DISEASE Maternal Grandmother      HEART DISEASE Maternal Grandfather      Psychotic Disorder Paternal Grandfather      Suicide Paternal Grandfather      Depression Paternal Grandfather      Pediatrician. Committed suicide by pistol in 1990.     Musculoskeletal Disorder Brother      back     Depression Brother      Expressed as anger and moodiness     Substance Abuse Sister      Depression Sister      Mental Health Therapist, yet no anti-depressants?     Anxiety Disorder Sister      Mental Health Therapist, yet no anti-anxiety meds?     Other Cancer Other      Bladder Cancer - Fatal     Substance Abuse Brother      Colon Cancer No family hx of      Crohn Disease No family hx of      Anesthesia Reaction No family hx of      CANCER No family hx of      No family history of skin cancer     Physical Exam     Temp Readings from Last 3 Encounters:   02/27/18 98.4  F (36.9  C) (Oral)   01/24/18 97.9  F (36.6  C) (Oral)   09/06/17 98.1  F (36.7  C) (Oral)     BP Readings from Last 5 Encounters:   02/28/18 135/88   02/27/18 110/80   02/12/18 (!) 133/92   01/24/18 116/78   09/06/17 122/78     Pulse Readings from Last 1 Encounters:   02/28/18 86     Resp Readings from Last 1 Encounters:   02/27/18 20     Estimated body mass index is 38.63 kg/(m^2) as calculated from the following:    Height as of this encounter: 1.956 m (6' 5\").    Weight as of this encounter: 147.8 kg (325 lb 12.8 oz).    GEN: NAD; obese  HEENT: MMM. No oral lesions. Anicteric, noninjected sclera  CV: S1, S2. RRR. No m/r/g.  PULM: CTA bilaterally. No w/c.  ABD: +BS.   MSK: MCPs, PIPs, DIPs, wrists, elbows, shoulders, knees, ankles, and MTPs without " swelling or tenderness to palpation.  Hips nontender to direct palpation but range of motion testing bothered his back.  No dactylitis.  Achilles tendons nontender to palpation.  Lumbar spine diffusely tender to palpation.    Heel and occiput to wall without difficulty.  About 1 foot gap when trying to touch his toes from a standing position.  NEURO: UE and LE strengths 5/5 and equal bilaterally.   SKIN: No rash.  No nail pitting.  EXT: No LE edema  PSYCH: Alert. Appropriate.  Anxious    Labs / Imaging (select studies)     RF/CCP  Recent Labs   Lab Test  08/07/15   0846  09/03/14   1232   RHF  <20  <20     DANIS  Recent Labs   Lab Test  09/03/14   1232   SAKSHI  <1.0  Interpretation:  Negative       Antiphospholipid Antibodies  Recent Labs   Lab Test  03/18/15   0915  11/01/14   1146   LUPINT  Negative  (Note)  COMMENTS:  The INR is normal.  APTT is normal.  1:2 Mix is not indicated.  DRVVT Screen is normal.  Thrombin time is normal.  NEGATIVE TEST; A LUPUS ANTICOAGULANT WAS NOT DETECTED IN THIS  SPECIMEN WITHIN THE LIMITS OF THE TESTING REPERTOIRE.  If the clinical picture is strongly suggestive of an antiphospholipid  syndrome, recommend anticardiolipin and beta-2-glycoprotein (IgG and  IgM) antibody tests.  Grisel Cohen M.D.  [phone]  3/20/2015    INR =  1.03    Reference range: 0.86-1.14  Thrombin Time= 17.6    Reference range: 13.0-19.0 sec    APTT:    Seconds  Reagent =  Stago LA  Patient  =  37  1:2 Mix  =  N/A  Reference range=  31-45     DILUTE GURWINDER VIPER VENOM TEST:  DRVVT Screen Ratio = 1.08   Normal is less than 1.21      Positive  (Note)  COMMENTS:  INR is elevated.  APTT is prolonged, but mixing study shows complete correction.  DRVVT Screen ratio is elevated.  DRVVT Mix Ratio is elevated.  DRVVT Normalized ratio is elevated.  Thrombin time is normal.  POSITIVE TEST; THIS PATIENT HAS A  LUPUS ANTICOAGULANT.  Recommend repeat testing in 12 weeks to determine if the Lupus  Anticoagulant is  persistent or transient, since a transient one does  not confer an increased thrombotic risk.  If the patient is on an anticoagulant, recommend repeat testing  without anticoagulation interference to rule out a false positive  lupus anticoagulant.  Patients with a lupus anticoagulant on warfarin therapy should be  monitored with a factor 2 (factor II) level or chromogenic factor 10  (factor X) assay.  Grisel Cohen M.D.  [phone]  11/3/2014    INR =  2.35    Reference range: 0.86-1.14  Thrombin Time= 17.5    Reference range: 13.0-19.0 sec    APTT:    Seconds  Reagent =  Stago LA  Patient  =  57  1:2 Mix  =   40  Reference range=  31-45     DILUTE GURWINDER VIPER VENOM TEST:  DRVVT Screen Ratio = 2.09   Normal is less than 1.21  DRVVT Mix Ratio  = 1.26   Normal is less than 1.21  Normalized Ratio  = 1.35   Normal is less than 1.21    *     CBC  Recent Labs   Lab Test  01/18/18   0834  09/02/16   0914  02/08/16   1555   WBC  8.5  10.4  9.0   RBC  5.23  5.08  4.82   HGB  15.2  15.8  14.7   HCT  45.8  46.9  45.0   MCV  88  92  93   RDW  13.6  14.2  13.8   PLT  330  255  264   MCH  29.1  31.1  30.5   MCHC  33.2  33.7  32.7   NEUTROPHIL  48.4  65.9  54.2   LYMPH  38.8  25.0  31.4   MONOCYTE  9.5  6.6  11.0   EOSINOPHIL  2.4  1.8  2.7   BASOPHIL  0.9  0.7  0.7   ANEU  4.1  6.8  4.9   ALYM  3.3  2.6  2.8   KATIE  0.8  0.7  1.0   AEOS  0.2  0.2  0.2   ABAS  0.1  0.1  0.1     CMP  Recent Labs   Lab Test  01/18/18   0834  05/03/17   1456  02/24/17   0939  01/03/17   0825  11/01/16   1546  06/14/16   1008   02/08/16   1555   01/25/16   1154  01/15/16   1605  10/25/15   0852   08/07/15   0846   NA  138  144   --   139  140   --    --    --    --    --    --   138   < >  138   POTASSIUM  3.9  4.1   --   4.0  4.3   --    --    --    --    --    --   3.9   < >  4.1   CHLORIDE  102  108   --   105  105   --    --    --    --    --    --   107   < >  105   CO2  26  26   --   27  26   --    --    --    --    --    --   26   <  >  25   ANIONGAP  10  10   --   7  9   --    --    --    --    --    --   4   < >  8   GLC  106*  99  88  99  83  89   --    --    --    --    --   106*   < >  109*   BUN  18  16   --   13  11   --    < >   --    --    --    --   10   < >  12   CR  1.10  1.19   --   1.32*  1.10   --    < >   --    < >   --    --   1.08   < >  1.00   GFRESTIMATED  73  66   --   59*  73   --    < >   --    < >   --    --   75   < >  82   GFRESTBLACK  88  80   --   71  88   --    < >   --    < >   --    --   >90   GFR Calc     < >  >90   GFR Calc     ALYCE  9.4  9.5   --   9.2  9.6   --    --    --    --    --    --   8.8   < >  9.5   BILITOTAL   --    --    --   0.3   --    --    --   0.2   --   0.3  0.3  0.2   --   0.4   ALBUMIN   --    --    --   4.2   --    --    --    --    --   4.0   --   3.7   --   4.2   PROTTOTAL   --    --    --   7.2   --    --    --    --    --   7.4   --   6.8   --   7.4   ALKPHOS   --    --    --   66   --    --    --   100   --   73  92  92   --   66   AST   --    --    --   18   --    --    --   21   --   13  12  20   --   32   ALT   --    --    --   27   --    --    --   28   --   16  20  26   --   38    < > = values in this interval not displayed.     GGT  Recent Labs   Lab Test  03/18/10   2315   GGT  28     HgA1c  Recent Labs   Lab Test  01/18/18   0834  05/16/17   1042  09/01/16   0945   A1C  5.8  5.5  5.5     Calcium/VitaminD  Recent Labs   Lab Test  01/18/18   0834  05/03/17   1456  01/03/17   0825   02/08/16   1555  01/25/16   1154   09/26/11   0822  02/17/11   0839  08/28/10   1152   ALYCE  9.4  9.5  9.2   < >   --    --    < >  9.4  9.5   --    D3VIT   --    --    --    --    --    --    --   38  38  37   VITDT   --   30   --    --   34  44   < >   --    --    --     < > = values in this interval not displayed.     ESR/CRP  Recent Labs   Lab Test  02/13/16   1510  01/04/16   1403  10/25/15   0852   08/07/15   0846   SED  6  5   --    --   5   CRP  3.4  <2.9  3.1   <  >   --     < > = values in this interval not displayed.     CK/Aldolase  Recent Labs   Lab Test  08/07/15   0846  10/31/13   0731   CKT  110  60   ALDOLASE   --   3.6 U/L REFERENCE RANGE 1.5 to 8.1 Assayed at [employer] [address]     TSH/T4  Recent Labs   Lab Test  01/18/18   0834  11/01/16   1546  12/14/15   1017   08/07/15   0846   05/24/11   1553   03/18/10   2315   TSH  1.84  2.76  1.17   < >   --    < >   --    < >  6.38*   T4   --    --    --    --   1.18   --   0.95   --   0.88    < > = values in this interval not displayed.     Hepatitis B  Recent Labs   Lab Test  01/06/15   1028   HEPBANG  Nonreactive     Hepatitis C  Recent Labs   Lab Test  01/06/15   1028   HCVAB  Nonreactive   Assay performance characteristics have not been established for newborns,   infants, and children       Tuberculosis Screening  Recent Labs   Lab Test  01/06/15   1028   TBRSLT  Negative   TBAGN  0.00     UA  Recent Labs   Lab Test  09/02/16   0918  10/25/15   1054  07/28/14   1903   10/08/13   1540  09/10/13   1104   06/03/11   1859   COLOR  Yellow  Light Yellow  Straw   < >  Yellow  Yellow   < >  Yellow   APPEARANCE  Clear  Clear  Clear   < >  Clear  Clear   < >  Clear   URINEGLC  Negative  Negative  Negative   < >  Negative  Negative   < >  Negative   URINEBILI  Negative  Negative  Negative   < >  Negative  Negative   < >  Negative   SG  1.015  1.002*  1.006   < >  <=1.005  1.020   < >  <=1.005   URINEPH  7.5*  6.0  5.5   < >  7.0  7.0   < >  7.0   PROTEIN  Negative  Negative  Negative   < >  Negative  Trace*   < >  Negative   UROBILINOGEN  0.2   --    --    --   0.2  0.2   < >  0.2   NITRITE  Negative  Negative  Negative   < >  Negative  Negative   < >  Negative   UBLD  Negative  Negative  Negative   < >  Trace*  Negative   < >  Trace*   LEUKEST  Negative  Negative  Negative   < >  Negative  Negative   < >  Negative   WBCU   --    --   0   --   O - 2  O - 2   < >  O - 2   RBCU   --    --   1   --   O -  2  O - 2   < >  O - 2   SQUAMOUSEPI   --    --    --    --    --    --    --   Few   BACTERIA   --    --    --    --    --   Few*   --    --    MUCOUS   --    --    --    --    --   Present*   --    --     < > = values in this interval not displayed.     Urine Microscopic  Recent Labs   Lab Test  07/28/14   1903  10/08/13   1540  09/10/13   1104   06/03/11   1859   WBCU  0  O - 2  O - 2   < >  O - 2   RBCU  1  O - 2  O - 2   < >  O - 2   SQUAMOUSEPI   --    --    --    --   Few   BACTERIA   --    --   Few*   --    --    MUCOUS   --    --   Present*   --    --     < > = values in this interval not displayed.     Urine Protein  Recent Labs   Lab Test  01/18/18   0834  11/01/16   1551  08/28/15   1531   UCRR  58  46  57     Immunization History     Immunization History   Administered Date(s) Administered     Influenza (IIV3) PF 10/15/2008, 08/21/2012, 09/09/2013     Influenza Vaccine IM 3yrs+ 4 Valent IIV4 10/02/2015, 10/11/2016     Pneumo Conj 13-V (2010&after) 10/02/2015     Pneumococcal 23 valent 10/11/2016     TD (ADULT, 7+) 10/04/2002     TDAP Vaccine (Adacel) 07/16/2010          Chart documentation done in part with Dragon Voice recognition Software. Although reviewed after completion, some word and grammatical error may remain.    Oneil Baxter MD

## 2018-03-01 LAB
HBV CORE AB SERPL QL IA: NONREACTIVE
HBV SURFACE AG SERPL QL IA: NONREACTIVE
HCV AB SERPL QL IA: NONREACTIVE

## 2018-03-02 ENCOUNTER — MYC MEDICAL ADVICE (OUTPATIENT)
Dept: RHEUMATOLOGY | Facility: CLINIC | Age: 43
End: 2018-03-02

## 2018-03-02 LAB
M TB TUBERC IFN-G BLD QL: NEGATIVE
M TB TUBERC IFN-G/MITOGEN IGNF BLD: 0 IU/ML

## 2018-03-09 NOTE — TELEPHONE ENCOUNTER
Joel,  I recommend asking Dr. Lyles more about it.  He could put in an order.  Certainly osteophytes at the SI joint were noted when you went to see Dr. Mcfarlane in the past, but he notes that it was NOT felt to be part of an inflammatory condition.  This radiologist is reading differently.  I think the a 2nd opinion would be worthwhile to consider, especially as you have some positive labs too.    Ericka Diaz MD  [facility]

## 2018-03-12 ENCOUNTER — OFFICE VISIT (OUTPATIENT)
Dept: NEPHROLOGY | Facility: CLINIC | Age: 43
End: 2018-03-12
Payer: COMMERCIAL

## 2018-03-12 VITALS
OXYGEN SATURATION: 96 % | TEMPERATURE: 97.9 F | DIASTOLIC BLOOD PRESSURE: 74 MMHG | WEIGHT: 315 LBS | HEART RATE: 104 BPM | BODY MASS INDEX: 37.19 KG/M2 | HEIGHT: 77 IN | SYSTOLIC BLOOD PRESSURE: 116 MMHG

## 2018-03-12 DIAGNOSIS — I10 HYPERTENSION GOAL BP (BLOOD PRESSURE) < 140/90: Chronic | ICD-10-CM

## 2018-03-12 DIAGNOSIS — F31.81 BIPOLAR 2 DISORDER (H): Chronic | ICD-10-CM

## 2018-03-12 DIAGNOSIS — R79.89 ELEVATED SERUM CREATININE: Primary | ICD-10-CM

## 2018-03-12 DIAGNOSIS — I10 ESSENTIAL HYPERTENSION WITH GOAL BLOOD PRESSURE LESS THAN 140/90: Chronic | ICD-10-CM

## 2018-03-12 LAB
ALBUMIN SERPL-MCNC: 4.1 G/DL (ref 3.4–5)
ALBUMIN UR-MCNC: NEGATIVE MG/DL
ANION GAP SERPL CALCULATED.3IONS-SCNC: 6 MMOL/L (ref 3–14)
APPEARANCE UR: CLEAR
BILIRUB UR QL STRIP: NEGATIVE
BUN SERPL-MCNC: 17 MG/DL (ref 7–30)
CALCIUM SERPL-MCNC: 9.1 MG/DL (ref 8.5–10.1)
CHLORIDE SERPL-SCNC: 106 MMOL/L (ref 94–109)
CO2 SERPL-SCNC: 27 MMOL/L (ref 20–32)
COLOR UR AUTO: YELLOW
CREAT SERPL-MCNC: 1.3 MG/DL (ref 0.66–1.25)
CREAT UR-MCNC: 99 MG/DL
GFR SERPL CREATININE-BSD FRML MDRD: 60 ML/MIN/1.7M2
GLUCOSE SERPL-MCNC: 95 MG/DL (ref 70–99)
GLUCOSE UR STRIP-MCNC: NEGATIVE MG/DL
HGB UR QL STRIP: NEGATIVE
KETONES UR STRIP-MCNC: NEGATIVE MG/DL
LEUKOCYTE ESTERASE UR QL STRIP: NEGATIVE
NITRATE UR QL: NEGATIVE
PH UR STRIP: 6 PH (ref 5–7)
PHOSPHATE SERPL-MCNC: 3.4 MG/DL (ref 2.5–4.5)
POTASSIUM SERPL-SCNC: 4.1 MMOL/L (ref 3.4–5.3)
PROT UR-MCNC: 0.08 G/L
PROT/CREAT 24H UR: 0.08 G/G CR (ref 0–0.2)
PTH-INTACT SERPL-MCNC: 47 PG/ML (ref 18–80)
RBC #/AREA URNS AUTO: NORMAL /HPF
SODIUM SERPL-SCNC: 139 MMOL/L (ref 133–144)
SOURCE: NORMAL
SP GR UR STRIP: 1.01 (ref 1–1.03)
UROBILINOGEN UR STRIP-MCNC: NORMAL MG/DL (ref 0–2)
WBC #/AREA URNS AUTO: NORMAL /HPF

## 2018-03-12 PROCEDURE — 81001 URINALYSIS AUTO W/SCOPE: CPT | Performed by: INTERNAL MEDICINE

## 2018-03-12 PROCEDURE — 82610 CYSTATIN C: CPT | Mod: 90 | Performed by: INTERNAL MEDICINE

## 2018-03-12 PROCEDURE — 36415 COLL VENOUS BLD VENIPUNCTURE: CPT | Performed by: INTERNAL MEDICINE

## 2018-03-12 PROCEDURE — 99204 OFFICE O/P NEW MOD 45 MIN: CPT | Performed by: INTERNAL MEDICINE

## 2018-03-12 PROCEDURE — 84156 ASSAY OF PROTEIN URINE: CPT | Performed by: INTERNAL MEDICINE

## 2018-03-12 PROCEDURE — 99000 SPECIMEN HANDLING OFFICE-LAB: CPT | Performed by: INTERNAL MEDICINE

## 2018-03-12 PROCEDURE — 83970 ASSAY OF PARATHORMONE: CPT | Performed by: INTERNAL MEDICINE

## 2018-03-12 PROCEDURE — 80069 RENAL FUNCTION PANEL: CPT | Performed by: INTERNAL MEDICINE

## 2018-03-12 ASSESSMENT — PAIN SCALES - GENERAL: PAINLEVEL: MILD PAIN (3)

## 2018-03-12 NOTE — NURSING NOTE
"Joel Pineda's goals for this visit include: consult  He requests these members of his care team be copied on today's visit information:     PCP: Ksenia Lyles    Referring Provider:  No referring provider defined for this encounter.    Chief Complaint   Patient presents with     Consult       Initial /74  Pulse 104  Temp 97.9  F (36.6  C) (Oral)  Ht 1.956 m (6' 5\")  Wt (!) 147.8 kg (325 lb 12.8 oz)  SpO2 96%  BMI 38.63 kg/m2 Estimated body mass index is 38.63 kg/(m^2) as calculated from the following:    Height as of this encounter: 1.956 m (6' 5\").    Weight as of this encounter: 147.8 kg (325 lb 12.8 oz).  Medication Reconciliation: complete    "

## 2018-03-12 NOTE — LETTER
March 26, 2018       TO: Joel Pineda  [address] [address] [address]         Dear Joel,    Mr. Pineda,     Dr. Lyles has been in communication with me and is in agreement with holding your fenofibrate at this time. AFter being off of fenofibrate for 3-4 weeks, I would like to have you go to the nearest [place] to have your kidney function rechecked. I will place this order so it is in the system. Please call with any questions or concerns.     Sincerely,     Kimberly Douglas, DO     Component      Latest Ref Rng & Units 3/12/2018   Color Urine       Yellow   Appearance Urine       Clear   Glucose Urine      NEG:Negative mg/dL Negative   Bilirubin Urine      NEG:Negative Negative   Ketones Urine      NEG:Negative mg/dL Negative   Specific Gravity Urine      1.003 - 1.035 1.009   Blood Urine      NEG:Negative Negative   pH Urine      5.0 - 7.0 pH 6.0   Protein Albumin Urine      NEG:Negative mg/dL Negative   Urobilinogen mg/dL      0.0 - 2.0 mg/dL Normal   Nitrite Urine      NEG:Negative Negative   Leukocyte Esterase Urine      NEG:Negative Negative   Source       Midstream Urine   WBC Urine      OTO5:0 - 5 /HPF 0 - 5   RBC Urine      OTO2:O - 2 /HPF O - 2   Sodium      133 - 144 mmol/L 139   Potassium      3.4 - 5.3 mmol/L 4.1   Chloride      94 - 109 mmol/L 106   Carbon Dioxide      20 - 32 mmol/L 27   Anion Gap      3 - 14 mmol/L 6   Glucose      70 - 99 mg/dL 95   Urea Nitrogen      7 - 30 mg/dL 17   Creatinine      0.66 - 1.25 mg/dL 1.30 (H)   GFR Estimate      >60 mL/min/1.7m2 60 (L)   GFR Estimate If Black      >60 mL/min/1.7m2 72   Calcium      8.5 - 10.1 mg/dL 9.1   Phosphorus      2.5 - 4.5 mg/dL 3.4   Albumin      3.4 - 5.0 g/dL 4.1   Protein Random Urine      g/L 0.08   Protein Total Urine g/gr Creatinine      0 - 0.2 g/g Cr 0.08   Lab Scanned Result       CYSTATIN C GFR-Scanned (A)   Parathyroid Hormone Intact      18 - 80 pg/mL 47   Creatinine Urine      mg/dL 99

## 2018-03-12 NOTE — TELEPHONE ENCOUNTER
Patient is returning clinics call. He states that he will be in this afternoon and he would like to inform clinic that he will be bringing some records from [facility] with him this afternoon. Does not need a call back.

## 2018-03-14 LAB — LAB SCANNED RESULT: ABNORMAL

## 2018-03-19 ENCOUNTER — ALLIED HEALTH/NURSE VISIT (OUTPATIENT)
Dept: PHARMACY | Facility: CLINIC | Age: 43
End: 2018-03-19
Payer: COMMERCIAL

## 2018-03-19 ENCOUNTER — INFUSION THERAPY VISIT (OUTPATIENT)
Dept: INFUSION THERAPY | Facility: CLINIC | Age: 43
End: 2018-03-19
Payer: COMMERCIAL

## 2018-03-19 VITALS
DIASTOLIC BLOOD PRESSURE: 78 MMHG | BODY MASS INDEX: 38.9 KG/M2 | RESPIRATION RATE: 16 BRPM | WEIGHT: 315 LBS | HEART RATE: 69 BPM | OXYGEN SATURATION: 95 % | SYSTOLIC BLOOD PRESSURE: 117 MMHG | TEMPERATURE: 98.5 F

## 2018-03-19 DIAGNOSIS — M45.8 ANKYLOSING SPONDYLITIS OF SACRAL REGION (H): ICD-10-CM

## 2018-03-19 DIAGNOSIS — I10 ESSENTIAL HYPERTENSION WITH GOAL BLOOD PRESSURE LESS THAN 140/90: Primary | ICD-10-CM

## 2018-03-19 DIAGNOSIS — E78.5 HYPERLIPIDEMIA LDL GOAL <130: ICD-10-CM

## 2018-03-19 DIAGNOSIS — J45.31 MILD PERSISTENT ASTHMA WITH ACUTE EXACERBATION: ICD-10-CM

## 2018-03-19 DIAGNOSIS — G89.4 CHRONIC PAIN SYNDROME: ICD-10-CM

## 2018-03-19 DIAGNOSIS — F41.8 DEPRESSION WITH ANXIETY: ICD-10-CM

## 2018-03-19 DIAGNOSIS — M45.8 ANKYLOSING SPONDYLITIS OF SACRAL REGION (H): Primary | ICD-10-CM

## 2018-03-19 PROCEDURE — 99207 ZZC NO CHARGE LOS: CPT

## 2018-03-19 PROCEDURE — 96415 CHEMO IV INFUSION ADDL HR: CPT | Performed by: INTERNAL MEDICINE

## 2018-03-19 PROCEDURE — 99607 MTMS BY PHARM ADDL 15 MIN: CPT | Mod: U4 | Performed by: PHARMACIST

## 2018-03-19 PROCEDURE — 96413 CHEMO IV INFUSION 1 HR: CPT | Performed by: INTERNAL MEDICINE

## 2018-03-19 PROCEDURE — 99606 MTMS BY PHARM EST 15 MIN: CPT | Mod: U4 | Performed by: PHARMACIST

## 2018-03-19 RX ORDER — DULOXETIN HYDROCHLORIDE 30 MG/1
20 CAPSULE, DELAYED RELEASE ORAL EVERY OTHER DAY
Qty: 60 CAPSULE | COMMUNITY
Start: 2018-03-19 | End: 2018-05-01 | Stop reason: DRUGHIGH

## 2018-03-19 RX ADMIN — Medication 250 ML: at 13:26

## 2018-03-19 NOTE — MR AVS SNAPSHOT
After Visit Summary   3/19/2018    Joel Pineda    MRN: [id number]           Patient Information     Date Of Birth          1975        Visit Information        Provider Department      3/19/2018 1:00 PM BAY 6 INFUSION [facility]        Today's Diagnoses     Ankylosing spondylitis of sacral region (H)    -  1       Follow-ups after your visit        Your next 10 appointments already scheduled     Mar 26, 2018  1:50 PM CDT   MyChart Physical Therapy TMJ Follow Up Treatment with Shady Doherty, PT   [facility] ([facility]    [address]  [facility] [id number] [phone]           If you have your physician's order in hand, please bring it with you to your appointment            Apr 03, 2018  1:00 PM CDT   Level 3 with BAY 8 INFUSION   [facility] ([facility]    [address] [place] [id number] [phone]            May 01, 2018  1:00 PM CDT   Level 3 with BAY 5 INFUSION   [facility] ([facility])    [address] [place] [address] [phone]            May 31, 2018  9:20 AM CDT   Return Visit with Oneil Baxter MD   [facility] ([facility])    [address] [id number] [phone]              Who to contact     If you have questions or need follow up information about today's clinic visit or your schedule please contact [facility] directly at [phone].  Normal or non-critical lab and imaging results will be communicated to you by MyChart, letter or phone within 4 business days after the clinic has received the results. If you do not hear from us within 7 days, please contact the clinic through MyChart or phone. If you have a critical or abnormal lab result, we will notify you by phone as soon as possible.  Submit refill requests through [employer] or call your pharmacy  and they will forward the refill request to us. Please allow 3 business days for your refill to be completed.          Additional Information About Your Visit        [employer] Information     [employer] gives you secure access to your electronic health record. If you see a primary care provider, you can also send messages to your care team and make appointments. If you have questions, please call your primary care clinic.  If you do not have a primary care provider, please call [phone] and they will assist you.      [employer] is an electronic gateway that provides easy, online access to your medical records. With [employer], you can request a clinic appointment, read your test results, renew a prescription or communicate with your care team.     To access your existing account, please contact your [facility] or call [phone] for assistance.        Care EveryWhere ID     This is your Care EveryWhere ID. This could be used by other organizations to access your [facility] medical records  [id number]        Your Vitals Were     Pulse Temperature Respirations Pulse Oximetry BMI (Body Mass Index)       69 98.5  F (36.9  C) (Oral) 16 95% 38.9 kg/m2        Blood Pressure from Last 3 Encounters:   03/19/18 117/78   03/12/18 116/74   02/28/18 135/88    Weight from Last 3 Encounters:   03/19/18 (!) 148.8 kg (328 lb)   03/12/18 (!) 147.8 kg (325 lb 12.8 oz)   02/28/18 (!) 147.8 kg (325 lb 12.8 oz)              Today, you had the following     No orders found for display         Today's Medication Changes          These changes are accurate as of 3/19/18  4:11 PM.  If you have any questions, ask your nurse or doctor.               These medicines have changed or have updated prescriptions.        Dose/Directions    DULoxetine 30 MG EC capsule   Commonly known as:  CYMBALTA   This may have changed:  how much to take   Changed by:  Radha Mcdonald, [facility]        Dose:  60 mg   Take 2 capsules (60  mg) by mouth daily   Quantity:  60 capsule   Refills:  0                Primary Care Provider Office Phone # Fax #    Ksenia Shady Lyles [phone]       [address] [facility] [address]        Goals        General    I will continue to attend Psychiatry appointments for medication management, 1/14/2014 (pt-stated)     Notes - Note edited  8/24/2016  4:04 PM by Dalila Cavazos LSW    As of today's date 8/24/2016 goal is met at 51 - 75%.   Goal Status:  Ongoing Sees  Therapist every other week and Psych PA every 3 weeks.  As of today's date 5/25/2016 goal is met at 51 - 75%.   Goal Status:  Showing progress-  Meeting with Psych PA for second time tomorrow  To review meds As of today's date 4/11/2016 goal is met at 51 - 75%.   Goal Status:  Showing progress  As of today's date 1/14/2014 goal is met at 51 - 75%.   Goal Status:  Active        I will schedule therapy with new counselor, 1/14/2014 (pt-stated)     Notes - Note edited  5/25/2016  1:54 PM by Dalila Cavazos LSW    As of today's date 5/25/2016 goal is met at 76 - 100%.   Goal Status:  Ongoing New therapist and Psych PA on a regular basis  As of today's date 5/10/2016 goal is met at 76 - 100%.   Goal Status:  Ongoing met with new therapist at [facility]  As of today's date 4/11/2016 goal is met at 51 - 75%.   Goal Status:  Ongoing meets with therapist regularly  As of today's date 1/14/2014 goal is met at 0 - 25%.   Goal Status:  Active        Psychosocial I need some help with cleaning (pt-stated)     Notes - Note edited  6/23/2016  1:57 PM by Dalila Cavazos LSW    As of today's date 6/23/2016 goal is met at 26 - 50%.   Goal Status:  Showing progress met with [facility]. Has not yet made a decision  As of today's date 5/25/2016 goal is met at 0 - 25%.   Goal Status:  Ongoing agency had to reschedule appt.  As of today's date 5/10/2016 goal is met at 0 - 25%.   Goal Status:  Active referral to [facility]        Equal Access  to Services     ANNA CANO : Madhuri Darby, wataisha thompson, qacatherinealysia kaalmafabrice ojeda, isabell echevarria. So [facility] [phone].    ATENCIÓN: Si pachecola eulalia, tiene a faustin disposición servicios gratuitos de asistencia lingüística. Llame al [phone].    We comply with applicable federal civil rights laws and [place] laws. We do not discriminate on the basis of race, color, national origin, age, disability, sex, sexual orientation, or gender identity.            Thank you!     Thank you for choosing [facility]  for your care. Our goal is always to provide you with excellent care. Hearing back from our patients is one way we can continue to improve our services. Please take a few minutes to complete the written survey that you may receive in the mail after your visit with us. Thank you!             Your Updated Medication List - Protect others around you: Learn how to safely use, store and throw away your medicines at www.disposemymeds.org.          This list is accurate as of 3/19/18  4:11 PM.  Always use your most recent med list.                   Brand Name Dispense Instructions for use Diagnosis    acetaminophen 500 MG Caps     60 capsule    Take 500 mg by mouth every 4 hours as needed        albuterol 108 (90 BASE) MCG/ACT Inhaler    PROAIR HFA/PROVENTIL HFA/VENTOLIN HFA     Inhale 2 puffs into the lungs every 4 hours as needed        ALPRAZolam 1 MG 24 hr tablet    XANAX XR     Take 1 mg by mouth every morning        atorvastatin 40 MG tablet    LIPITOR    90 tablet    TAKE 1 TABLET (40 MG) BY MOUTH DAILY    Mixed hyperlipidemia       celecoxib 200 MG capsule    celeBREX    180 capsule    TAKE 1 CAPSULE BY MOUTH TWICE DAILY AS NEEDED FORMODERATE PAIN    Chronic midline low back pain without sciatica       cetirizine 10 MG tablet    zyrTEC    30 tablet    Take 1 tablet (10 mg) by mouth At Bedtime    Cough       cyanocobalamin 1000 MCG/ML injection     VITAMIN B12    10 mL    Inject 1 mL (1,000 mcg) into the muscle every 30 days    B12 deficiency       DULoxetine 30 MG EC capsule    CYMBALTA    60 capsule    Take 2 capsules (60 mg) by mouth daily        EPINEPHrine 0.3 MG/0.3ML injection 2-pack    EPIPEN/ADRENACLICK/or ANY BX GENERIC EQUIV    0.6 mL    Inject 0.3 mLs (0.3 mg) into the muscle once as needed for anaphylaxis    Food allergy       fenofibrate 145 MG tablet     90 tablet    Take 1 tablet (145 mg) by mouth daily    Mixed hyperlipidemia       FISH OIL PO           hydrOXYzine 50 MG tablet    ATARAX     Take 50 mg by mouth daily        lamoTRIgine 25 MG tablet    LaMICtal     Take 200 mg by mouth daily        levothyroxine 75 MCG tablet    SYNTHROID/LEVOTHROID    90 tablet    Take 1 tablet (75 mcg) by mouth every morning    Acquired hypothyroidism       lidocaine 5 % ointment    XYLOCAINE    250 g    Apply topically 3 times daily as needed for moderate pain Apply as directed    Chronic midline low back pain without sciatica       loperamide 2 MG capsule    IMODIUM     Take 2 mg by mouth 4 times daily as needed for diarrhea        metoprolol succinate 200 MG 24 hr tablet    TOPROL-XL     Take 200 mg by mouth daily        omeprazole 20 MG tablet      Take 20 mg by mouth daily        ondansetron 8 MG tablet    ZOFRAN    20 tablet    TAKE 1 TABLET (8 MG) BY MOUTH EVERY 8 HOURS AS NEEDED FOR NAUSEA/VOMITING.    Nausea       * order for DME     12 each    Equipment being ordered: 1 ml tuberculin syringes to be used for Vitamin B12 injections.    Vitamin B12 deficiency (non anaemic)       * order for DME     1 Units    SI-loc belt    SI (sacroiliac) joint dysfunction       * order for DME      [employer]ics REMSTAR 60 Series Auto CPAP 10-12 cm H2O, Wisp nasal mask w/a large cushion and a chinstrap        oxyCODONE IR 5 MG tablet    ROXICODONE    35 tablet    Take 1-2 tablets (5-10 mg) by mouth every 6 hours as needed for pain (maximum 4 tablet(s) per day)     Facet arthropathy       pregabalin 75 MG capsule    LYRICA    60 capsule    Take 1 capsule (75 mg) by mouth 2 times daily    Chronic midline low back pain without sciatica       ramipril 5 MG capsule    ALTACE    90 capsule    TAKE 1 CAPSULE BY MOUTH DAILY    Hypertension goal BP (blood pressure) < 140/90       tiZANidine 4 MG tablet    ZANAFLEX    90 tablet    Take 1 tablet (4 mg) by mouth 3 times daily as needed for muscle spasms    Dorsalgia       vitamin D3 15439 UNITS capsule    CHOLECALCIFEROL    24 capsule    Take one capsule every two weeks.    Vitamin D deficiency       * Notice:  This list has 3 medication(s) that are the same as other medications prescribed for you. Read the directions carefully, and ask your doctor or other care provider to review them with you.

## 2018-03-19 NOTE — PROGRESS NOTES
SUBJECTIVE/OBJECTIVE:                           Joel Pineda is a 42 year old male seen for a follow-up for Medication Therapy Management in the infusion center.  He was referred to me from Ksenia Lyles.     Chief Complaint: Follow-up from visit on 2/5/2018.    Allergies/ADRs: Reviewed in Epic  Tobacco: No tobacco use  Alcohol: none  Caffeine: not assessed today  Activity: Not assessed today  PMH: Reviewed in Epic    Ankylosing Spondylitis: Current medications include Remicade 800 mg IV once, celecoxib 200 mg bid. Patient feels celecoxib bid is helping. Patient is receiving first Remicade infusion today.    Hypertension: Current medications include ramipril 5mg daily, metoprolol XL 200mg daily. Patient has not been monitoring blood pressures at home.  Patient reports no current medication side effects.     Mood/Anxiety: current therapy includes lamictal 200mg daily, Xanax XR 1mg daily, duloxetine 60 mg daily. Patient decreased duloxetine because he was feeling hypermanic. He feels this dose decrease has been helpful. Patient sees his therapist at [facility] every other week or so. Patient was attending a bi-weekly men's depression group ([facility]), but he is unsure if this is the right fit form him.    Pain: current therapy includes APAP as needed, celebrex 200mg bid, lyrica 75mg bid. Patient uses oxycodone 10mg about every other day for flare ups. It is helpful for flare ups. Patient also uses tizanadie 4mg tid as needed for flare ups. Patient is hopeful that remicade infusions will help with pain and he will be able to discontinue many of his pain medications.    Asthma: current therapy includes albuterol. Patient has not had to use in over 6 months.    Hyperlipidemia: Current therapy includes atorvastatin 40mg once daily and Fenofibrate 145mg once daily.  Pt reports no significant myalgias or other side effects.   The 10-year ASCVD risk score (Veyo MOISE Jr, et al., 2013) is: 5.2%    Values used to calculate  the score:      Age: 42 years      Sex: Male      Is Non- : No      Diabetic: Yes      Tobacco smoker: No      Systolic Blood Pressure: 117 mmHg      Is BP treated: Yes      HDL Cholesterol: 31 mg/dL      Total Cholesterol: 178 mg/dL     Current labs include:  Today's Vitals: There were no vitals taken for this visit.  BP Readings from Last 3 Encounters:   03/19/18 117/78   03/12/18 116/74   02/28/18 135/88     Lab Results   Component Value Date    A1C 5.8 01/18/2018   .  Lab Results   Component Value Date    CHOL 178 01/18/2018     Lab Results   Component Value Date    TRIG 319 01/18/2018     Lab Results   Component Value Date    HDL 31 01/18/2018     Lab Results   Component Value Date    LDL 83 01/18/2018       Liver Function Studies -   Recent Labs   Lab Test  03/12/18   1559  01/03/17   0825   PROTTOTAL   --   7.2   ALBUMIN  4.1  4.2   BILITOTAL   --   0.3   ALKPHOS   --   66   AST   --   18   ALT   --   27       Lab Results   Component Value Date    UCRR 99 03/12/2018    MICROL <5 01/18/2018    UMALCR Unable to calculate due to low value 01/18/2018       Last Basic Metabolic Panel:  Lab Results   Component Value Date     03/12/2018      Lab Results   Component Value Date    POTASSIUM 4.1 03/12/2018     Lab Results   Component Value Date    CHLORIDE 106 03/12/2018     Lab Results   Component Value Date    BUN 17 03/12/2018     Lab Results   Component Value Date    CR 1.30 03/12/2018     GFR Estimate   Date Value Ref Range Status   03/12/2018 60 (L) >60 mL/min/1.7m2 Final     Comment:     Non  GFR Calc   01/18/2018 73 >60 mL/min/1.7m2 Final     Comment:     Non  GFR Calc   05/03/2017 66 >60 mL/min/1.7m2 Final     Comment:     Non  GFR Calc     GFR Estimate If Black   Date Value Ref Range Status   03/12/2018 72 >60 mL/min/1.7m2 Final     Comment:      GFR Calc   01/18/2018 88 >60 mL/min/1.7m2 Final     Comment:       GFR Calc   05/03/2017 80 >60 mL/min/1.7m2 Final     Comment:      GFR Calc     TSH   Date Value Ref Range Status   01/18/2018 1.84 0.40 - 4.00 mU/L Final   ]    Most Recent Immunizations   Administered Date(s) Administered     Influenza (IIV3) PF 09/09/2013     Influenza Vaccine IM 3yrs+ 4 Valent IIV4 10/11/2016     Pneumo Conj 13-V (2010&after) 10/02/2015     Pneumococcal 23 valent 10/11/2016     TD (ADULT, 7+) 10/04/2002     TDAP Vaccine (Adacel) 07/16/2010         ASSESSMENT:                             Current medications were reviewed today.     Medication Adherence: no issues identified    Ankylosing Spondylitis: stable    Hypertension: stable. Patient is meeting goal <140/90mmHg.    Mood/Anxiety: Stable.  Patient in close follow-up with psychiatry.    Pain: Stable.    Asthma: Stable.  Patient is meeting ACT goal of greater than 20.    Hyperlipidemia: Stable.    PLAN:                            No medication changes made today.    I spent 30 minutes with this patient today. All changes were made via collaborative practice agreement with Ksenia Lyles. A copy of the visit note was provided to the patient's primary care provider.    Will follow up in 6 weeks.    The patient was sent via [employer] a summary of these recommendations as an after visit summary.     Radha Mcdonald, Pharm.D, BCACP  Medication Therapy Management Pharmacist

## 2018-03-19 NOTE — PROGRESS NOTES
"Infusion Nursing Note:  Joel Pineda presents today for Remicade.    Patient seen by provider today: No   present during visit today: Not Applicable.    Note: Pharmacy talked with Patient regarding Remicade and provided education.     Intravenous Access:  Peripheral IV placed.    Treatment Conditions:  Rheumatology Infusion Checklist: PRIOR TO INFUSION OF BIOLOGICAL MEDICATIONS OR ANY OF THESE AS LISTED: Remicaide (infliximab) \".rheumbiologicalchecklist\"    Prior to Infusion of biological medications or any of these as listed:    1. Elevated temperature, fever, chills, productive cough or abnormal vital signs, night sweats, coughing up blood or sputum, no appetite or abnormal vital signs : NO    2. Open wounds or new incisions: NO    3. Recent hospitalization: NO    4.  Recent surgeries:  NO    5. Any upcoming surgeries or dental procedures?:NO    6. Any current or recent bouts of illness or infection? On any antibiotics? : NO    7. Any new, sudden or worsening abdominal pain :NO    8. Vaccination within 4 weeks? Patient or someone in the household is scheduled to receive vaccination? No live virus vaccines prior to or during treatment :NO    9. Any nervous system diseases [i.e. multiple sclerosis, Guillain-[place], seizures, neurological  changes]: NO    10. Pregnant or breast feeding; or plans on pregnancy in the future: NO    11. Signs of worsening depression or suicidal ideations while taking benlysta:N/A    12. New-onset medical symptoms: NO    13.  New cancer diagnosis or on chemotherapy or radiation NO    14.  Evaluate for any sign of active TB [Unexplained weight loss, Loss of appetite, Night sweats, Fever, Fatigue, Chills, Coughing for 3 weeks or longer, Hemoptysis (coughing up blood), Chest pain]: NO    **Note: If answered yes to any of the above, hold the infusion and contact ordering rheumatologist or on-call rheumatologist.   .      Post Infusion Assessment:  Patient tolerated infusion " without incident.  No evidence of extravasations.  Access discontinued per protocol.  Rheumatology Post Infusion: POST-INFUSION OF BIOLOGICAL MEDICATION:    Reviewed with patient  Inform patient if any fever, chills or signs of infection, new symptoms, abdominal pain, heart palpitations, shortness of breath, reaction, weakness, neurological changes, seek medical attention immediately and should not receive infusions. No live virus vaccines prior to or during treatment or up to 6 months post infusion. If the patient has an upcoming procedure or surgery, this should be discussed with the rheumatologist and surgeon or provider..    Discharge Plan:   Discharge instructions reviewed with: Patient.  Patient and/or family verbalized understanding of discharge instructions and all questions answered.  Patient discharged in stable condition accompanied by: self.  Departure Mode: Ambulatory.      Lenore Solares RN

## 2018-03-19 NOTE — MR AVS SNAPSHOT
After Visit Summary   3/19/2018    Joel Pineda    MRN: [id number]           Patient Information     Date Of Birth          1975        Visit Information        Provider Department      3/19/2018 3:00 PM Radha Mcdonald [facility]        Today's Diagnoses     Essential hypertension with goal blood pressure less than 140/90    -  1    Hyperlipidemia LDL goal <130        Depression with anxiety        Ankylosing spondylitis of sacral region (H)        Chronic pain syndrome        Mild persistent asthma with acute exacerbation          Care Instructions    No medication changes made today.          Follow-ups after your visit        Follow-up notes from your care team     Return in about 1 year (around 3/19/2019).      Your next 10 appointments already scheduled     Mar 26, 2018  1:50 PM CDT   MyChart Physical Therapy [facility] Follow Up Treatment with Shady Doherty PT   [facility] ([facility]    [address]  [facility] [id number] [phone]           If you have your physician's order in hand, please bring it with you to your appointment            May 31, 2018  9:20 AM CDT   [facility]    [address] [id number] [phone]              Who to contact     If you have questions or need follow up information about today's clinic visit or your schedule please contact [facility] [place] directly at [phone].  Normal or non-critical lab and imaging results will be communicated to you by MyChart, letter or phone within 4 business days after the clinic has received the results. If you do not hear from us within 7 days, please contact the clinic through [employer] or phone. If you have a critical or abnormal lab result, we will notify you by phone as soon as possible.  Submit refill requests through [employer] or call your  pharmacy and they will forward the refill request to us. Please allow 3 business days for your refill to be completed.          Additional Information About Your Visit        [employer] Information     [employer] gives you secure access to your electronic health record. If you see a primary care provider, you can also send messages to your care team and make appointments. If you have questions, please call your primary care clinic.  If you do not have a primary care provider, please call [phone] and they will assist you.        Care EveryWhere ID     This is your Care EveryWhere ID. This could be used by other organizations to access your [facility] medical records  [id number]         Blood Pressure from Last 3 Encounters:   03/19/18 117/78   03/12/18 116/74   02/28/18 135/88    Weight from Last 3 Encounters:   03/19/18 (!) 328 lb (148.8 kg)   03/12/18 (!) 325 lb 12.8 oz (147.8 kg)   02/28/18 (!) 325 lb 12.8 oz (147.8 kg)              Today, you had the following     No orders found for display         Today's Medication Changes          These changes are accurate as of 3/19/18  3:25 PM.  If you have any questions, ask your nurse or doctor.               These medicines have changed or have updated prescriptions.        Dose/Directions    DULoxetine 30 MG EC capsule   Commonly known as:  CYMBALTA   This may have changed:  how much to take   Changed by:  Radha Mcdonald, [facility]        Dose:  60 mg   Take 2 capsules (60 mg) by mouth daily   Quantity:  60 capsule   Refills:  0                Primary Care Provider Office Phone # Fax #    Ksenia Shady Lyles [phone] [address] [address]        Goals        General    I will continue to attend Psychiatry appointments for medication management, 1/14/2014 (pt-stated)     Notes - Note edited  8/24/2016  4:04 PM by Dalila Cavazos LSW    As of today's date 8/24/2016 goal is met at 51 - 75%.   Goal Status:  Ongoing  Sees  Therapist every other week and Psych PA every 3 weeks.  As of today's date 5/25/2016 goal is met at 51 - 75%.   Goal Status:  Showing progress-  Meeting with Psych PA for second time tomorrow  To review meds As of today's date 4/11/2016 goal is met at 51 - 75%.   Goal Status:  Showing progress  As of today's date 1/14/2014 goal is met at 51 - 75%.   Goal Status:  Active        I will schedule therapy with new counselor, 1/14/2014 (pt-stated)     Notes - Note edited  5/25/2016  1:54 PM by Dalila Cavazos LSW    As of today's date 5/25/2016 goal is met at 76 - 100%.   Goal Status:  Ongoing New therapist and Psych PA on a regular basis  As of today's date 5/10/2016 goal is met at 76 - 100%.   Goal Status:  Ongoing met with new therapist at [facility]  As of today's date 4/11/2016 goal is met at 51 - 75%.   Goal Status:  Ongoing meets with therapist regularly  As of today's date 1/14/2014 goal is met at 0 - 25%.   Goal Status:  Active        Psychosocial I need some help with cleaning (pt-stated)     Notes - Note edited  6/23/2016  1:57 PM by Dalila Cavazos LSW    As of today's date 6/23/2016 goal is met at 26 - 50%.   Goal Status:  Showing progress met with [facility]. Has not yet made a decision  As of today's date 5/25/2016 goal is met at 0 - 25%.   Goal Status:  Ongoing agency had to reschedule appt.  As of today's date 5/10/2016 goal is met at 0 - 25%.   Goal Status:  Active referral to [facility]        Equal Access to Services     ANNA CANO : Hadletty Darby, fide thompson, isabell maloney. So [facility] [phone].    ATENCIÓN: Si habla español, tiene a faustin disposición servicios gratuitos de asistencia lingüística. Llame al [phone].    We comply with applicable federal civil rights laws and [place] laws. We do not discriminate on the basis of race, color, national origin, age, disability, sex, sexual orientation, or gender  identity.            Thank you!     Thank you for choosing [facility]  for your care. Our goal is always to provide you with excellent care. Hearing back from our patients is one way we can continue to improve our services. Please take a few minutes to complete the written survey that you may receive in the mail after your visit with us. Thank you!             Your Updated Medication List - Protect others around you: Learn how to safely use, store and throw away your medicines at www.disposemymeds.org.          This list is accurate as of 3/19/18  3:25 PM.  Always use your most recent med list.                   Brand Name Dispense Instructions for use Diagnosis    acetaminophen 500 MG Caps     60 capsule    Take 500 mg by mouth every 4 hours as needed        albuterol 108 (90 BASE) MCG/ACT Inhaler    PROAIR HFA/PROVENTIL HFA/VENTOLIN HFA     Inhale 2 puffs into the lungs every 4 hours as needed        ALPRAZolam 1 MG 24 hr tablet    XANAX XR     Take 1 mg by mouth every morning        atorvastatin 40 MG tablet    LIPITOR    90 tablet    TAKE 1 TABLET (40 MG) BY MOUTH DAILY    Mixed hyperlipidemia       celecoxib 200 MG capsule    celeBREX    180 capsule    TAKE 1 CAPSULE BY MOUTH TWICE DAILY AS NEEDED FORMODERATE PAIN    Chronic midline low back pain without sciatica       cetirizine 10 MG tablet    zyrTEC    30 tablet    Take 1 tablet (10 mg) by mouth At Bedtime    Cough       cyanocobalamin 1000 MCG/ML injection    VITAMIN B12    10 mL    Inject 1 mL (1,000 mcg) into the muscle every 30 days    B12 deficiency       DULoxetine 30 MG EC capsule    CYMBALTA    60 capsule    Take 2 capsules (60 mg) by mouth daily        EPINEPHrine 0.3 MG/0.3ML injection 2-pack    EPIPEN/ADRENACLICK/or ANY BX GENERIC EQUIV    0.6 mL    Inject 0.3 mLs (0.3 mg) into the muscle once as needed for anaphylaxis    Food allergy       fenofibrate 145 MG tablet     90 tablet    Take 1 tablet (145 mg) by mouth daily     Mixed hyperlipidemia       FISH OIL PO           hydrOXYzine 50 MG tablet    ATARAX     Take 50 mg by mouth daily        lamoTRIgine 25 MG tablet    LaMICtal     Take 200 mg by mouth daily        levothyroxine 75 MCG tablet    SYNTHROID/LEVOTHROID    90 tablet    Take 1 tablet (75 mcg) by mouth every morning    Acquired hypothyroidism       lidocaine 5 % ointment    XYLOCAINE    250 g    Apply topically 3 times daily as needed for moderate pain Apply as directed    Chronic midline low back pain without sciatica       loperamide 2 MG capsule    IMODIUM     Take 2 mg by mouth 4 times daily as needed for diarrhea        metoprolol succinate 200 MG 24 hr tablet    TOPROL-XL     Take 200 mg by mouth daily        omeprazole 20 MG tablet      Take 20 mg by mouth daily        ondansetron 8 MG tablet    ZOFRAN    20 tablet    TAKE 1 TABLET (8 MG) BY MOUTH EVERY 8 HOURS AS NEEDED FOR NAUSEA/VOMITING.    Nausea       * order for DME     12 each    Equipment being ordered: 1 ml tuberculin syringes to be used for Vitamin B12 injections.    Vitamin B12 deficiency (non anaemic)       * order for DME     1 Units    SI-loc belt    SI (sacroiliac) joint dysfunction       * order for DME      Respironics REMSTAR 60 Series Auto CPAP 10-12 cm H2O, Wisp nasal mask w/a large cushion and a chinstrap        oxyCODONE IR 5 MG tablet    ROXICODONE    35 tablet    Take 1-2 tablets (5-10 mg) by mouth every 6 hours as needed for pain (maximum 4 tablet(s) per day)    Facet arthropathy       pregabalin 75 MG capsule    LYRICA    60 capsule    Take 1 capsule (75 mg) by mouth 2 times daily    Chronic midline low back pain without sciatica       ramipril 5 MG capsule    ALTACE    90 capsule    TAKE 1 CAPSULE BY MOUTH DAILY    Hypertension goal BP (blood pressure) < 140/90       tiZANidine 4 MG tablet    ZANAFLEX    90 tablet    Take 1 tablet (4 mg) by mouth 3 times daily as needed for muscle spasms    Dorsalgia       vitamin D3 46110 UNITS capsule     CHOLECALCIFEROL    24 capsule    Take one capsule every two weeks.    Vitamin D deficiency       * Notice:  This list has 3 medication(s) that are the same as other medications prescribed for you. Read the directions carefully, and ask your doctor or other care provider to review them with you.

## 2018-03-22 ENCOUNTER — TELEPHONE (OUTPATIENT)
Dept: RHEUMATOLOGY | Facility: CLINIC | Age: 43
End: 2018-03-22

## 2018-03-22 NOTE — PROGRESS NOTES
Actually, as long as he is on stating therapy that will provide the best CV protection, and we can maximize that.  The fenofibrate may simply serve as frosting on top.  No problem at all with stopping it, or I can get him in to see me to set out a plan.  Odell

## 2018-03-22 NOTE — TELEPHONE ENCOUNTER
Joel Pineda is a 42 year old male who calls with severe abdominal pain, nausea, and diaphoresis.    Patient took oxycodone and tylenol for severe abdominal pain. Took Zofran for the nausea. These have minimally improved his symptoms. Denies fever or any other signs of infection.     NURSING ASSESSMENT:  Description:  Severe nausea and diaphoresis started this am. Intermittent abdominal pain began at the same time.   Onset/duration:  3/22/2018 am  Precip. factors:  Remicade infusion took place on Monday.   Associated symptoms:  none  Improves/worsens symptoms:  Oxycodone is improving pain.   Pain scale (0-10)   4/10, abdominal pain on 5 mg oxycodone. 5/10 at it's worst.   Last exam/Treatment:  2/28/2018  Allergies:   Allergies   Allergen Reactions     Banana Shortness Of Breath     Pt reports organic Banana is okay.      Nitroglycerin Palpitations     Penicillins Anaphylaxis     Provigil [Modafinil] Shortness Of Breath     headache     Ciprofloxacin Palpitations, Other (See Comments) and Rash     dizziness (versus metronidazole taken at same time)     Gadolinium Hives and Itching     Patient was premedicated for the contrast allergy. He did still have a reaction a few hours after injection. Hives and itching. Dr. Gomez told tech to inform pt he should only have contrast again in the future when premedicated and at a hospital. Not at an outpatient facility.      Dulera Other (See Comments)     Hoarse voice     Dye [Contrast Dye] Other (See Comments) and Hives     Moderate flushing, CT contrast     Levaquin Other (See Comments)     tendonitis     Levofloxacin      Tendonitis  Tendonitis     Neurontin [Gabapentin] Hives     Moderate hives     Nortriptyline Hives     Varicella Virus Vaccine Live      Rash     Ciprofloxacin Palpitations     Flagyl [Metronidazole Hcl] Palpitations and Hives     Latex Rash     Metronidazole Palpitations, Other (See Comments) and Rash     dizziness (versus ciprofloxacin taken at  same time)     No Clinical Screening - See Comments Rash     Nitrile gloves       MEDICATIONS:   Taking medication(s) as prescribed? Yes  Taking over the counter medication(s?) Yes  Any medication side effects? No significant side effects    Any barriers to taking medication(s) as prescribed?  No  Medication(s) improving/managing symptoms?  Oxycodone is improving pain, but it is still severe.   Medication reconciliation completed: No      NURSING PLAN: Nursing advice to patient be seen in urgent care or ED within the next couple hours.     RECOMMENDED DISPOSITION:  To ED/UC for evaluation  Will comply with recommendation: Yes  If further questions/concerns or if symptoms do not improve, worsen or new symptoms develop, call your PCP or [facility] as soon as possible.      Guideline used:  Telephone Triage Protocols for Nurses, Fifth Edition, Holly Mckeon RN

## 2018-03-22 NOTE — PROGRESS NOTES
March 12, 2018    I was asked to see this patient by Dr. Lyles for evaluation of elevated creatinine.     CC: elevated creatinine    HPI: Joel Pineda is a 42 year old male who presents for evaluation of elevated creatinine. On review of his creatinine readings, it has varied over the past 2 years. In 2016 it was 0.9 but ose to 1.32 in Jan 3017, more recently 1.1 in Jan 2018. He brings with him outside creatinine readings and describes his clinical state as the following:  - 1.4 in Jan 2018 while at urgent care for gastroenteritis  - 1.5 in August 2017 - dehydrated with gastroenteritis  - 1.5 in August 2017 following contrast exposure  Mr. Pineda's hx is significant for IBS, ankylosing spondylitis, hypertension (greater than 10 years), bipolar disorder for which he was on lithium for years (off since Oct 2017). He has been dx with pre-DM - he was previously on metformin but this was stopped because of diarrhea difficulties. He has been using celebrex BID currently and remains on fenofibrate as well as ramipril. Ua in 2016 was bland.     - History of Hematuria: no  - Swelling: no  - Hx of UTIs: one year ago  - Hx of stones: no  - Rashes/Joint pain: has had shingles; back pain related to his AS  - Family hx of kidney disease: great Grandmother with nephrectomy  - NSAID use: celebrex BID - he is hoping his recent infusions with help so he can get away from the celebrex.        Allergies   Allergen Reactions     Banana Shortness Of Breath     Pt reports organic Banana is okay.      Nitroglycerin Palpitations     Penicillins Anaphylaxis     Provigil [Modafinil] Shortness Of Breath     headache     Ciprofloxacin Palpitations, Other (See Comments) and Rash     dizziness (versus metronidazole taken at same time)     Gadolinium Hives and Itching     Patient was premedicated for the contrast allergy. He did still have a reaction a few hours after injection. Hives and itching. Dr. Gomez told tech to inform pt he  should only have contrast again in the future when premedicated and at a hospital. Not at an outpatient facility.      Dulera Other (See Comments)     Hoarse voice     Dye [Contrast Dye] Other (See Comments) and Hives     Moderate flushing, CT contrast     Levaquin Other (See Comments)     tendonitis     Levofloxacin      Tendonitis  Tendonitis     Neurontin [Gabapentin] Hives     Moderate hives     Nortriptyline Hives     Varicella Virus Vaccine Live      Rash     Ciprofloxacin Palpitations     Flagyl [Metronidazole Hcl] Palpitations and Hives     Latex Rash     Metronidazole Palpitations, Other (See Comments) and Rash     dizziness (versus ciprofloxacin taken at same time)     No Clinical Screening - See Comments Rash     Nitrile gloves         Current Outpatient Prescriptions on File Prior to Visit:  Omega-3 Fatty Acids (FISH OIL PO)    hydrOXYzine (ATARAX) 50 MG tablet Take 50 mg by mouth daily   oxyCODONE IR (ROXICODONE) 5 MG tablet Take 1-2 tablets (5-10 mg) by mouth every 6 hours as needed for pain (maximum 4 tablet(s) per day)   loperamide (IMODIUM) 2 MG capsule Take 2 mg by mouth 4 times daily as needed for diarrhea   levothyroxine (SYNTHROID/LEVOTHROID) 75 MCG tablet Take 1 tablet (75 mcg) by mouth every morning   celecoxib (CELEBREX) 200 MG capsule TAKE 1 CAPSULE BY MOUTH TWICE DAILY AS NEEDED FORMODERATE PAIN   ramipril (ALTACE) 5 MG capsule TAKE 1 CAPSULE BY MOUTH DAILY   fenofibrate 145 MG tablet Take 1 tablet (145 mg) by mouth daily   atorvastatin (LIPITOR) 40 MG tablet TAKE 1 TABLET (40 MG) BY MOUTH DAILY   ondansetron (ZOFRAN) 8 MG tablet TAKE 1 TABLET (8 MG) BY MOUTH EVERY 8 HOURS AS NEEDED FOR NAUSEA/VOMITING.   ALPRAZolam (XANAX XR) 1 MG 24 hr tablet Take 1 mg by mouth every morning   lamoTRIgine (LAMICTAL) 25 MG tablet Take 200 mg by mouth daily    metoprolol (TOPROL-XL) 200 MG 24 hr tablet Take 200 mg by mouth daily   cyanocobalamin (VITAMIN B12) 1000 MCG/ML injection Inject 1 mL (1,000 mcg)  into the muscle every 30 days   pregabalin (LYRICA) 75 MG capsule Take 1 capsule (75 mg) by mouth 2 times daily   vitamin D3 (CHOLECALCIFEROL) 84045 UNITS capsule Take one capsule every two weeks.   tiZANidine (ZANAFLEX) 4 MG tablet Take 1 tablet (4 mg) by mouth 3 times daily as needed for muscle spasms   albuterol (PROAIR HFA/PROVENTIL HFA/VENTOLIN HFA) 108 (90 BASE) MCG/ACT Inhaler Inhale 2 puffs into the lungs every 4 hours as needed   EPINEPHrine 0.3 MG/0.3ML injection Inject 0.3 mLs (0.3 mg) into the muscle once as needed for anaphylaxis   order for DME Respironics REMSTAR 60 Series Auto CPAP 10-12 cm H2O, Wisp nasal mask w/a large cushion and a chinstrap   lidocaine (XYLOCAINE) 5 % ointment Apply topically 3 times daily as needed for moderate pain Apply as directed   acetaminophen 500 MG CAPS Take 500 mg by mouth every 4 hours as needed   cetirizine (ZYRTEC) 10 MG tablet Take 1 tablet (10 mg) by mouth At Bedtime   order for DME SI-loc belt   order for DME Equipment being ordered: 1 ml tuberculin syringes to be used for Vitamin B12 injections.   omeprazole 20 MG tablet Take 20 mg by mouth daily      No current facility-administered medications on file prior to visit.     Past Medical History:   Diagnosis Date     Acne      Chest pain     Chest pain, regulated w/BP meds. Clear arteries.     Skin exam, screening for cancer 12/3/2013       Past Surgical History:   Procedure Laterality Date     BACK SURGERY  10/07    lumbar discectomy L5-S1     COLONOSCOPY      Note: colonoscopy scheduled with [facility] on Friday, 9/4/15     COSMETIC SURGERY  2012    Nose Exterior - functional     GI SURGERY  August 2013    Sigmoidectomy     HERNIA REPAIR, UMBILICAL  8/23/11    Dr. Evan whiting     INCISION AND DRAINAGE, ABSCESS, COMPLEX  8/23/11    umbilical, Dr. Evan Beavers     LAPAROSCOPIC ASSISTED COLECTOMY LEFT (DESCENDING)  8/15/2013    Procedure: LAPAROSCOPIC ASSISTED COLECTOMY LEFT (DESCENDING);  Laparoscopic Hand  "Assisted Sigmoid Resection, Mobilization of Splenic Fissure, coloproctoscopy, *Latex Free Room* Anesthesia General with Pain block  ;  Surgeon: Aurora Justice MD;  Location: [facility] OR     NERVE SURGERY  8/18/11    RF ablation @ L3-S1 @ [facility]     RECONSTRUCT NOSE AND SEPTUM (FUNCTIONAL)  10/14/2011    Procedure:RECONSTRUCT NOSE AND SEPTUM (FUNCTIONAL); Functional Septorhinoplasty, Turbinate Reduction, ; Surgeon:CEDRIC CUEVAS; Location:[facility] OR     SINUS SURGERY  10/1/01    ethmoidectomy chronic sinusitis       Social History   Substance Use Topics     Smoking status: Never Smoker     Smokeless tobacco: Never Used     Alcohol use No       Family History   Problem Relation Age of Onset     Musculoskeletal Disorder Mother      back     Anxiety Disorder Mother      Colon Polyps Mother      Ulcerative Colitis Mother      and ischemic small intestine, surgery     Hypertension Mother      Breast Cancer Mother      OSTEOPOROSIS Mother      DIABETES Mother      Type 2, Diagnosed in 2014     Depression Mother      Takes Cymbalta to help with chronic pain + depx     Thyroid Disease Mother      Hypothyroidism     Obesity Mother      Under much better control latter half of 2015     Musculoskeletal Disorder Father      back     Substance Abuse Father      Hypertension Father      Hyperlipidemia Father      Depression Father      Off meds for many years. Seems \"ok\"     HEART DISEASE Maternal Grandmother      HEART DISEASE Maternal Grandfather      Psychotic Disorder Paternal Grandfather      Suicide Paternal Grandfather      Depression Paternal Grandfather      Pediatrician. Committed suicide by pistol in 1990.     Musculoskeletal Disorder Brother      back     Depression Brother      Expressed as anger and moodiness     Substance Abuse Sister      Depression Sister      Mental Health Therapist, yet no anti-depressants?     Anxiety Disorder Sister      Mental Health Therapist, yet no anti-anxiety meds?     Other Cancer Other      " "Bladder Cancer - Fatal     Substance Abuse Brother      Colon Cancer No family hx of      Crohn Disease No family hx of      Anesthesia Reaction No family hx of      CANCER No family hx of      No family history of skin cancer       ROS: A 12 system review of systems was negative other than noted here or above.     Exam:  /74  Pulse 104  Temp 97.9  F (36.6  C) (Oral)  Ht 1.956 m (6' 5\")  Wt (!) 147.8 kg (325 lb 12.8 oz)  SpO2 96%  BMI 38.63 kg/m2    GENERAL APPEARANCE: alert and no distress  EYES: PERRL, no scleral icterus  HENT: mouth without ulcers or lesions  NECK: supple, no adenopathy  RESP: lungs clear to auscultation   CV: regular rhythm, normal rate, no rub  Extremities: no clubbing, cyanosis, or edema  SKIN: no rash  NEURO: mentation intact and speech normal  PSYCH: affect normal/bright    Results:    Office Visit on 03/12/2018   Component Date Value Ref Range Status     Sodium 03/12/2018 139  133 - 144 mmol/L Final     Potassium 03/12/2018 4.1  3.4 - 5.3 mmol/L Final     Chloride 03/12/2018 106  94 - 109 mmol/L Final     Carbon Dioxide 03/12/2018 27  20 - 32 mmol/L Final     Anion Gap 03/12/2018 6  3 - 14 mmol/L Final     Glucose 03/12/2018 95  70 - 99 mg/dL Final    Non Fasting     Urea Nitrogen 03/12/2018 17  7 - 30 mg/dL Final     Creatinine 03/12/2018 1.30* 0.66 - 1.25 mg/dL Final     GFR Estimate 03/12/2018 60* >60 mL/min/1.7m2 Final    Non  GFR Calc     GFR Estimate If Black 03/12/2018 72  >60 mL/min/1.7m2 Final    African American GFR Calc     Calcium 03/12/2018 9.1  8.5 - 10.1 mg/dL Final     Phosphorus 03/12/2018 3.4  2.5 - 4.5 mg/dL Final     Albumin 03/12/2018 4.1  3.4 - 5.0 g/dL Final     Lab Scanned Result 03/12/2018 CYSTATIN C GFR-Scanned*  Final     Protein Random Urine 03/12/2018 0.08  g/L Final     Protein Total Urine g/gr Creatinine 03/12/2018 0.08  0 - 0.2 g/g Cr Final     Color Urine 03/12/2018 Yellow   Final     Appearance Urine 03/12/2018 Clear   Final     " Glucose Urine 03/12/2018 Negative  NEG^Negative mg/dL Final     Bilirubin Urine 03/12/2018 Negative  NEG^Negative Final     Ketones Urine 03/12/2018 Negative  NEG^Negative mg/dL Final     Specific Gravity Urine 03/12/2018 1.009  1.003 - 1.035 Final     Blood Urine 03/12/2018 Negative  NEG^Negative Final     pH Urine 03/12/2018 6.0  5.0 - 7.0 pH Final     Protein Albumin Urine 03/12/2018 Negative  NEG^Negative mg/dL Final     Urobilinogen mg/dL 03/12/2018 Normal  0.0 - 2.0 mg/dL Final     Nitrite Urine 03/12/2018 Negative  NEG^Negative Final     Leukocyte Esterase Urine 03/12/2018 Negative  NEG^Negative Final     Source 03/12/2018 Midstream Urine   Final     WBC Urine 03/12/2018 0 - 5  OTO5^0 - 5 /HPF Final     RBC Urine 03/12/2018 O - 2  OTO2^O - 2 /HPF Final     Parathyroid Hormone Intact 03/12/2018 47  18 - 80 pg/mL Final     Creatinine Urine 03/12/2018 99  mg/dL Final       Assessment/Plan:   1. Elevated creatinine: he is at risk for kidney disease in the setting of previous lithium use, longstanding hypertension, drug related injury (fenofibrate, celebrex), and also potential implications of previous RAIMUNDO events. I will start by reevaluating his kidney function at this time. I will measure his function using two methods to get a better sense of his true kidney function (cystatin C as well as creatinine will be measured). Will also get urine studies to assess for hematuria/proteinuria. Will then determine a follow-up plan based on these results. Educated to get away from celebrex as soon as able as well as avoid other NSAIDs. Ideally we will get him away from fenofibrate as well but will discuss with Dr. Lyles prior to doing so.     2. Hypertension: blood pressure is at goal - no changes today.     3. Hypothyroidism: TSH normal in Jan 2018    4. Bipolar disorder: was on lithium for years but has been away now since Oct 2017.     5. Hyperlipidemia: currently on fenofibrate. If kidney function is not normal at  this time, ideally will get away from the fenofibrate to assure it is not contributing to the elevated creatinine.       Kimberly Douglas, DO

## 2018-03-22 NOTE — TELEPHONE ENCOUNTER
Nurse from the infusion called to report that patient called there and stated he is feeling nauseous and sweaty. Per Dr. Baxter, please triage.  Mackenzie Cavazos CMA  3/22/2018 2:59 PM

## 2018-03-26 ENCOUNTER — THERAPY VISIT (OUTPATIENT)
Dept: PHYSICAL THERAPY | Facility: CLINIC | Age: 43
End: 2018-03-26
Payer: COMMERCIAL

## 2018-03-26 DIAGNOSIS — M26.609 TEMPORAL MANDIBULAR JOINT DISORDER: Primary | ICD-10-CM

## 2018-03-26 PROCEDURE — 97140 MANUAL THERAPY 1/> REGIONS: CPT | Mod: GP | Performed by: PHYSICAL THERAPIST

## 2018-03-26 PROCEDURE — 97110 THERAPEUTIC EXERCISES: CPT | Mod: GP | Performed by: PHYSICAL THERAPIST

## 2018-03-26 NOTE — PROGRESS NOTES
Subjective:  HPI                    Objective:  System    Physical Exam    General     ROS    Assessment/Plan:    PROGRESS  REPORT    Progress reporting period is from 1/30/2018 to 3/26/2018.       SUBJECTIVE  Subjective changes noted by patient:  Subjective: Patient reports feeling no jaw pain and is happy with his progress at this point.     Current pain level is  Current Pain level: 2/10.     Previous pain level was   Initial Pain level: 6/10.   Changes in function:  Yes (See Goal flowsheet attached for changes in current functional level)  Adverse reaction to treatment or activity: None    OBJECTIVE  Changes noted in objective findings:  Yes,   Objective: hypertonic left upper trap. Otherwise unremarkable TMJ exam    ASSESSMENT/PLAN  Updated problem list and treatment plan: Diagnosis 1:  TMJD    STG/LTGs have been met or progress has been made towards goals:  Yes (See Goal flow sheet completed today.)  Assessment of Progress: The patient's condition is improving.  The patient has met all of their long term goals.  Self Management Plans:  Patient is independent in a home treatment program.  Patient is independent in self management of symptoms.  I have re-evaluated this patient and find that the nature, scope, duration and intensity of the therapy is appropriate for the medical condition of the patient.  Joel continues to require the following intervention to meet STG and LTG's:  PT intervention is no longer required to meet STG/LTG.    Recommendations:  This patient is ready to be discharged from therapy and continue their home treatment program.    Please refer to the daily flowsheet for treatment today, total treatment time and time spent performing 1:1 timed codes.

## 2018-03-29 ENCOUNTER — MYC REFILL (OUTPATIENT)
Dept: FAMILY MEDICINE | Facility: CLINIC | Age: 43
End: 2018-03-29

## 2018-03-29 DIAGNOSIS — M47.819 FACET ARTHROPATHY: ICD-10-CM

## 2018-03-29 RX ORDER — OXYCODONE HYDROCHLORIDE 5 MG/1
5-10 TABLET ORAL EVERY 6 HOURS PRN
Qty: 35 TABLET | Refills: 0 | Status: SHIPPED | OUTPATIENT
Start: 2018-03-29 | End: 2018-05-07

## 2018-03-29 NOTE — TELEPHONE ENCOUNTER
According to [facility] last filled 2/27/18.  No fills outside of this office.  Visit recommended every 6 months and has met that criteria.   Prescription signed. Please place at  and notify patient

## 2018-03-29 NOTE — TELEPHONE ENCOUNTER
Requested Prescriptions   Pending Prescriptions Disp Refills     oxyCODONE IR (ROXICODONE) 5 MG tablet 35 tablet 0     Sig: Take 1-2 tablets (5-10 mg) by mouth every 6 hours as needed for pain (maximum 4 tablet(s) per day)    There is no refill protocol information for this order        oxyCODONE IR (ROXICODONE) 5 MG tablet      Last Written Prescription Date:  2/27/18  Last Fill Quantity: 35,   # refills: 0  Last Office Visit: 2/27/18  Future Office visit:    Next 5 appointments (look out 90 days)     May 31, 2018  9:20 AM CDT   Return Visit with Oneil Baxter MD   [facility]    [address]  [place] [address] [phone]                   Routing refill request to provider for review/approval because:  Drug not on the [facility], [facility] or [facility] refill protocol or controlled substance

## 2018-03-29 NOTE — TELEPHONE ENCOUNTER
Message from Grid20/20hart:  Original authorizing provider: MD Pankaj Dickeycarol SCOTTLopez Miriam would like a refill of the following medications:  oxyCODONE IR (ROXICODONE) 5 MG tablet [Ksenia Lyles MD]    Preferred pharmacy: [facility]     Comment:  I would like to pickup the Rx next Tuesday, 4/3, if possible. Thanks.

## 2018-04-03 ENCOUNTER — INFUSION THERAPY VISIT (OUTPATIENT)
Dept: INFUSION THERAPY | Facility: CLINIC | Age: 43
End: 2018-04-03
Payer: COMMERCIAL

## 2018-04-03 VITALS
BODY MASS INDEX: 38.71 KG/M2 | HEART RATE: 75 BPM | WEIGHT: 315 LBS | TEMPERATURE: 97.2 F | DIASTOLIC BLOOD PRESSURE: 83 MMHG | RESPIRATION RATE: 18 BRPM | SYSTOLIC BLOOD PRESSURE: 125 MMHG | OXYGEN SATURATION: 99 %

## 2018-04-03 DIAGNOSIS — M45.8 ANKYLOSING SPONDYLITIS OF SACRAL REGION (H): Primary | ICD-10-CM

## 2018-04-03 PROCEDURE — 96413 CHEMO IV INFUSION 1 HR: CPT | Performed by: NURSE PRACTITIONER

## 2018-04-03 PROCEDURE — 99207 ZZC NO CHARGE LOS: CPT

## 2018-04-03 PROCEDURE — 96415 CHEMO IV INFUSION ADDL HR: CPT | Performed by: NURSE PRACTITIONER

## 2018-04-03 RX ADMIN — Medication 250 ML: at 13:38

## 2018-04-03 NOTE — MR AVS SNAPSHOT
After Visit Summary   4/3/2018    Joel Pineda    MRN: [id number]           Patient Information     Date Of Birth          1975        Visit Information        Provider Department      4/3/2018 1:00 PM [place] [facility]        Today's Diagnoses     Ankylosing spondylitis of sacral region (H)    -  1       Follow-ups after your visit        Your next 10 appointments already scheduled     May 01, 2018  1:00 PM CDT   Level 3 with [place] [facility] ([facility])    [address] [place] [id number] [phone]            May 31, 2018  9:20 AM CDT   [facility] with [facility]il Baxter MD   [facility]    [address] [facility] [address] [phone]              Who to contact     If you have questions or need follow up information about today's clinic visit or your schedule please contact [facility] directly at [phone].  Normal or non-critical lab and imaging results will be communicated to you by MyChart, letter or phone within 4 business days after the clinic has received the results. If you do not hear from us within 7 days, please contact the clinic through [employer] or phone. If you have a critical or abnormal lab result, we will notify you by phone as soon as possible.  Submit refill requests through [employer] or call your pharmacy and they will forward the refill request to us. Please allow 3 business days for your refill to be completed.          Additional Information About Your Visit        [employer] Information     [employer] gives you secure access to your electronic health record. If you see a primary care provider, you can also send messages to your care team and make appointments. If you have questions, please call your primary care clinic.  If you do not have a primary care provider, please call [phone] and they will assist you.       Circle is an electronic gateway that provides easy, online access to your medical records. With Circle, you can request a clinic appointment, read your test results, renew a prescription or communicate with your care team.     To access your existing account, please contact your [facility] or call [phone] for assistance.        Care EveryWhere ID     This is your Care EveryWhere ID. This could be used by other organizations to access your [place] medical records  [id number]        Your Vitals Were     Pulse Temperature Respirations Pulse Oximetry BMI (Body Mass Index)       75 97.2  F (36.2  C) (Oral) 18 99% 38.71 kg/m2        Blood Pressure from Last 3 Encounters:   04/03/18 125/83   03/19/18 117/78   03/12/18 116/74    Weight from Last 3 Encounters:   04/03/18 148.1 kg (326 lb 6.4 oz)   03/19/18 (!) 148.8 kg (328 lb)   03/12/18 (!) 147.8 kg (325 lb 12.8 oz)              Today, you had the following     No orders found for display       Primary Care Provider Office Phone # Fax #    Ksenia Shady Lyles [phone] [address] [facility] [address]        Goals        General    I will continue to attend Psychiatry appointments for medication management, 1/14/2014 (pt-stated)     Notes - Note edited  8/24/2016  4:04 PM by Dalila Cavazos LSW    As of today's date 8/24/2016 goal is met at 51 - 75%.   Goal Status:  Ongoing Sees  Therapist every other week and Psych PA every 3 weeks.  As of today's date 5/25/2016 goal is met at 51 - 75%.   Goal Status:  Showing progress-  Meeting with Psych PA for second time tomorrow  To review meds As of today's date 4/11/2016 goal is met at 51 - 75%.   Goal Status:  Showing progress  As of today's date 1/14/2014 goal is met at 51 - 75%.   Goal Status:  Active        I will schedule therapy with new counselor, 1/14/2014 (pt-stated)     Notes - Note edited  5/25/2016  1:54 PM by Dalila Cavazos  BRANDIW    As of today's date 5/25/2016 goal is met at 76 - 100%.   Goal Status:  Ongoing New therapist and Psych PA on a regular basis  As of today's date 5/10/2016 goal is met at 76 - 100%.   Goal Status:  Ongoing met with new therapist at [facility]  As of today's date 4/11/2016 goal is met at 51 - 75%.   Goal Status:  Ongoing meets with therapist regularly  As of today's date 1/14/2014 goal is met at 0 - 25%.   Goal Status:  Active        Psychosocial I need some help with cleaning (pt-stated)     Notes - Note edited  6/23/2016  1:57 PM by Dalila Cavazos LSW    As of today's date 6/23/2016 goal is met at 26 - 50%.   Goal Status:  Showing progress met with [facility]. Has not yet made a decision  As of today's date 5/25/2016 goal is met at 0 - 25%.   Goal Status:  Ongoing agency had to reschedule appt.  As of today's date 5/10/2016 goal is met at 0 - 25%.   Goal Status:  Active referral to [facility]        Equal Access to Services     [facility]: Hadii floyd ashley hadasho Soisaac, waaxda luqadaha, qaybta kaalmada adejenny, isabell shankar . So [facility] [phone].    ATENCIÓN: Si habla español, tiene a faustin disposición servicios gratuitos de asistencia lingüística. Llame al [phone].    We comply with applicable federal civil rights laws and [place] laws. We do not discriminate on the basis of race, color, national origin, age, disability, sex, sexual orientation, or gender identity.            Thank you!     Thank you for choosing [facility]  for your care. Our goal is always to provide you with excellent care. Hearing back from our patients is one way we can continue to improve our services. Please take a few minutes to complete the written survey that you may receive in the mail after your visit with us. Thank you!             Your Updated Medication List - Protect others around you: Learn how to safely use, store and throw away your medicines at www.disposemymeds.org.           This list is accurate as of 4/3/18  3:42 PM.  Always use your most recent med list.                   Brand Name Dispense Instructions for use Diagnosis    acetaminophen 500 MG Caps     60 capsule    Take 500 mg by mouth every 4 hours as needed        albuterol 108 (90 BASE) MCG/ACT Inhaler    PROAIR HFA/PROVENTIL HFA/VENTOLIN HFA     Inhale 2 puffs into the lungs every 4 hours as needed        ALPRAZolam 1 MG 24 hr tablet    XANAX XR     Take 1 mg by mouth every morning        atorvastatin 40 MG tablet    LIPITOR    90 tablet    TAKE 1 TABLET (40 MG) BY MOUTH DAILY    Mixed hyperlipidemia       celecoxib 200 MG capsule    celeBREX    180 capsule    TAKE 1 CAPSULE BY MOUTH TWICE DAILY AS NEEDED FORMODERATE PAIN    Chronic midline low back pain without sciatica       cetirizine 10 MG tablet    zyrTEC    30 tablet    Take 1 tablet (10 mg) by mouth At Bedtime    Cough       cyanocobalamin 1000 MCG/ML injection    VITAMIN B12    10 mL    Inject 1 mL (1,000 mcg) into the muscle every 30 days    B12 deficiency       DULoxetine 30 MG EC capsule    CYMBALTA    60 capsule    Take 2 capsules (60 mg) by mouth daily        EPINEPHrine 0.3 MG/0.3ML injection 2-pack    EPIPEN/ADRENACLICK/or ANY BX GENERIC EQUIV    0.6 mL    Inject 0.3 mLs (0.3 mg) into the muscle once as needed for anaphylaxis    Food allergy       fenofibrate 145 MG tablet     90 tablet    Take 1 tablet (145 mg) by mouth daily    Mixed hyperlipidemia       FISH OIL PO           hydrOXYzine 50 MG tablet    ATARAX     Take 50 mg by mouth daily        lamoTRIgine 25 MG tablet    LaMICtal     Take 200 mg by mouth daily        levothyroxine 75 MCG tablet    SYNTHROID/LEVOTHROID    90 tablet    Take 1 tablet (75 mcg) by mouth every morning    Acquired hypothyroidism       lidocaine 5 % ointment    XYLOCAINE    250 g    Apply topically 3 times daily as needed for moderate pain Apply as directed    Chronic midline low back pain without sciatica       loperamide 2 MG  capsule    IMODIUM     Take 2 mg by mouth 4 times daily as needed for diarrhea        metoprolol succinate 200 MG 24 hr tablet    TOPROL-XL     Take 200 mg by mouth daily        omeprazole 20 MG tablet      Take 20 mg by mouth daily        ondansetron 8 MG tablet    ZOFRAN    20 tablet    TAKE 1 TABLET (8 MG) BY MOUTH EVERY 8 HOURS AS NEEDED FOR NAUSEA/VOMITING.    Nausea       order for DME     12 each    Equipment being ordered: 1 ml tuberculin syringes to be used for Vitamin B12 injections.    Vitamin B12 deficiency (non anaemic)       order for DME     1 Units    SI-loc belt    SI (sacroiliac) joint dysfunction       order for DME      [employer]ics REMSTAR 60 Series Auto CPAP 10-12 cm H2O, Wisp nasal mask w/a large cushion and a chinstrap        oxyCODONE IR 5 MG tablet    ROXICODONE    35 tablet    Take 1-2 tablets (5-10 mg) by mouth every 6 hours as needed for pain (maximum 4 tablet(s) per day)    Facet arthropathy       pregabalin 75 MG capsule    LYRICA    60 capsule    Take 1 capsule (75 mg) by mouth 2 times daily    Chronic midline low back pain without sciatica       ramipril 5 MG capsule    ALTACE    90 capsule    TAKE 1 CAPSULE BY MOUTH DAILY    Hypertension goal BP (blood pressure) < 140/90       tiZANidine 4 MG tablet    ZANAFLEX    90 tablet    Take 1 tablet (4 mg) by mouth 3 times daily as needed for muscle spasms    Dorsalgia       vitamin D3 42517 UNITS capsule    CHOLECALCIFEROL    24 capsule    Take one capsule every two weeks.    Vitamin D deficiency

## 2018-04-03 NOTE — PROGRESS NOTES
Infusion Nursing Note:  Joel Pineda presents today for Remicade.    Patient seen by provider today: No   present during visit today: Not Applicable.    Note: N/A.    Intravenous Access:  Peripheral IV placed.    Treatment Conditions:  ~~~ NOTE: If the patient answers yes to any of the questions below, hold the infusion and contact ordering provider or on-call provider.    1. Have you recently had an elevated temperature, fever, chills, productive cough, coughing for 3 weeks or longer or hemoptysis,  abnormal vital signs, night sweats,  chest pain or have you noticed a decrease in your appetite, unexplained weight loss or fatigue? No  2. Do you have any open wounds or new incisions? No  3. Do you have any recent or upcoming hospitalizations, surgeries or dental procedures? No  4. Do you currently have or recently have had any signs of illness or infection or are you on any antibiotics? No  5. Have you had any new, sudden or worsening abdominal pain? No  6. Have you or anyone in your household received a live vaccination in the past 4 weeks? Please note:  No live vaccines while on biologic/chemotherapy until 6 months after the last treatment.  Patient can receive the flu vaccine (shot only) and the pneumovax.  It is optimal for the patient to get these vaccines mid cycle, but they can be given at any time as long as it is not on the day of the infusion. No  7. Have you recently been diagnosed with any new nervous system diseases (ie. Multiple sclerosis, Guillain [place], seizures, neurological changes) or cancer diagnosis? Are you on any form of radiation or chemotherapy? No  8. Are you pregnant or breast feeding or do you have plans of pregnancy in the future? No  9. Have you been having any signs of worsening depression or suicidal ideations?  (benlysta only) No  10. Have there been any other new onset medical symptoms? No        Post Infusion Assessment:  Patient tolerated infusion without  incident.  Site patent and intact, free from redness, edema or discomfort.  Access discontinued per protocol.    Discharge Plan:    Patient will return 5/1/18 for next appointment.   Patient discharged in stable condition accompanied by: self.  Departure Mode: Ambulatory.    Yumiko Nicole RN

## 2018-04-07 ENCOUNTER — MYC REFILL (OUTPATIENT)
Dept: FAMILY MEDICINE | Facility: CLINIC | Age: 43
End: 2018-04-07

## 2018-04-07 DIAGNOSIS — M54.50 CHRONIC MIDLINE LOW BACK PAIN WITHOUT SCIATICA: ICD-10-CM

## 2018-04-07 DIAGNOSIS — G89.29 CHRONIC MIDLINE LOW BACK PAIN WITHOUT SCIATICA: ICD-10-CM

## 2018-04-09 RX ORDER — PREGABALIN 75 MG/1
75 CAPSULE ORAL 2 TIMES DAILY
Qty: 60 CAPSULE | Refills: 5 | Status: SHIPPED | OUTPATIENT
Start: 2018-04-09 | End: 2018-07-06

## 2018-04-09 NOTE — TELEPHONE ENCOUNTER
Message from MyChart:  Original authorizing provider: MD Tito Dickey would like a refill of the following medications:  pregabalin (LYRICA) 75 MG capsule [Ksenia Lyles MD]    Preferred pharmacy: [id number] [id number] [address]    Comment:  Thanks.

## 2018-04-09 NOTE — TELEPHONE ENCOUNTER
Requested Prescriptions   Pending Prescriptions Disp Refills     pregabalin (LYRICA) 75 MG capsule 60 capsule 5     Sig: Take 1 capsule (75 mg) by mouth 2 times daily    There is no refill protocol information for this order        pregabalin (LYRICA) 75 MG capsule      Last Written Prescription Date:  9/6/17  Last Fill Quantity: 60,   # refills: 5  Last Office Visit: 2/27/18  Future Office visit:    Next 5 appointments (look out 90 days)     May 31, 2018  9:20 AM CDT   [facility] with Oneil Baxter MD   [facility]    [address]  [place] [address]   [phone]                   Routing refill request to provider for review/approval because:  Drug not on the [facility], [facility] or [facility] refill protocol or controlled substance

## 2018-04-10 ENCOUNTER — TELEPHONE (OUTPATIENT)
Dept: RHEUMATOLOGY | Facility: CLINIC | Age: 43
End: 2018-04-10

## 2018-04-10 NOTE — TELEPHONE ENCOUNTER
Reason for Call:  Other prescription - infusion medication    Detailed comments: medication side affects - fatigue - sweating a lot Patient would like a call back to discuss.    Phone Number Patient can be reached at: Home number on file [phone] (home)    Best Time: Anytime    Can we leave a detailed message on this number? YES    Call taken on 4/10/2018 at 10:38 AM by Anitha De Jesus

## 2018-04-17 RX ORDER — ACETAMINOPHEN 325 MG/1
650 TABLET ORAL ONCE
Status: CANCELLED
Start: 2018-04-30 | End: 2018-04-30

## 2018-04-17 RX ORDER — METHYLPREDNISOLONE SODIUM SUCCINATE 125 MG/2ML
125 INJECTION, POWDER, LYOPHILIZED, FOR SOLUTION INTRAMUSCULAR; INTRAVENOUS ONCE
Status: CANCELLED | OUTPATIENT
Start: 2018-04-30 | End: 2018-04-30

## 2018-04-17 RX ORDER — DIPHENHYDRAMINE HCL 25 MG
25 CAPSULE ORAL ONCE
Status: CANCELLED
Start: 2018-04-30 | End: 2018-04-30

## 2018-04-17 NOTE — TELEPHONE ENCOUNTER
Rheumatology Telephone Note:    I called and spoke with Mr. Pineda.  Reactions to remicade.  Discussed changing medication versus pre-medicating.  He says that he premedicates with tylenol, benadryl, and steroids IV if contrast is needed and he tolerates that fine.  Will try premedicating with tylenol, benadryl, and steroids with next infusion. Advised that he will need to arrange for someone else to drive him. If remicade not tolerated despite premedications then will change biologic DMARD.     Next infusion scheduled for 5/1/2018  F/U appointment with me on 5/31/2018    All questions were answered and he thanked me for the call.     Oneil Baxter MD  4/17/2018 12:19 PM

## 2018-04-19 DIAGNOSIS — R79.89 ELEVATED SERUM CREATININE: ICD-10-CM

## 2018-04-19 LAB
ANION GAP SERPL CALCULATED.3IONS-SCNC: 11 MMOL/L (ref 3–14)
BUN SERPL-MCNC: 12 MG/DL (ref 7–30)
CALCIUM SERPL-MCNC: 9.4 MG/DL (ref 8.5–10.1)
CHLORIDE SERPL-SCNC: 104 MMOL/L (ref 94–109)
CO2 SERPL-SCNC: 23 MMOL/L (ref 20–32)
CREAT SERPL-MCNC: 0.97 MG/DL (ref 0.66–1.25)
GFR SERPL CREATININE-BSD FRML MDRD: 84 ML/MIN/1.7M2
GLUCOSE SERPL-MCNC: 135 MG/DL (ref 70–99)
POTASSIUM SERPL-SCNC: 3.9 MMOL/L (ref 3.4–5.3)
SODIUM SERPL-SCNC: 138 MMOL/L (ref 133–144)

## 2018-04-19 PROCEDURE — 80048 BASIC METABOLIC PNL TOTAL CA: CPT | Performed by: INTERNAL MEDICINE

## 2018-04-19 PROCEDURE — 36415 COLL VENOUS BLD VENIPUNCTURE: CPT | Performed by: INTERNAL MEDICINE

## 2018-04-24 ENCOUNTER — MYC MEDICAL ADVICE (OUTPATIENT)
Dept: NEPHROLOGY | Facility: CLINIC | Age: 43
End: 2018-04-24

## 2018-05-01 ENCOUNTER — ALLIED HEALTH/NURSE VISIT (OUTPATIENT)
Dept: PHARMACY | Facility: CLINIC | Age: 43
End: 2018-05-01
Payer: COMMERCIAL

## 2018-05-01 ENCOUNTER — INFUSION THERAPY VISIT (OUTPATIENT)
Dept: INFUSION THERAPY | Facility: CLINIC | Age: 43
End: 2018-05-01
Payer: COMMERCIAL

## 2018-05-01 VITALS — WEIGHT: 315 LBS | BODY MASS INDEX: 39.03 KG/M2

## 2018-05-01 DIAGNOSIS — E78.5 HYPERLIPIDEMIA LDL GOAL <100: Primary | ICD-10-CM

## 2018-05-01 DIAGNOSIS — R73.09 ELEVATED GLUCOSE: ICD-10-CM

## 2018-05-01 DIAGNOSIS — M45.8 ANKYLOSING SPONDYLITIS OF SACRAL REGION (H): ICD-10-CM

## 2018-05-01 DIAGNOSIS — G89.4 CHRONIC PAIN SYNDROME: ICD-10-CM

## 2018-05-01 DIAGNOSIS — M45.8 ANKYLOSING SPONDYLITIS OF SACRAL REGION (H): Primary | ICD-10-CM

## 2018-05-01 DIAGNOSIS — I10 ESSENTIAL HYPERTENSION WITH GOAL BLOOD PRESSURE LESS THAN 140/90: ICD-10-CM

## 2018-05-01 DIAGNOSIS — R11.0 NAUSEA: ICD-10-CM

## 2018-05-01 DIAGNOSIS — F41.8 DEPRESSION WITH ANXIETY: ICD-10-CM

## 2018-05-01 LAB — HBA1C MFR BLD: 5.8 % (ref 0–5.6)

## 2018-05-01 PROCEDURE — 96413 CHEMO IV INFUSION 1 HR: CPT | Performed by: INTERNAL MEDICINE

## 2018-05-01 PROCEDURE — 99606 MTMS BY PHARM EST 15 MIN: CPT | Mod: U4 | Performed by: PHARMACIST

## 2018-05-01 PROCEDURE — 99207 ZZC NO CHARGE LOS: CPT

## 2018-05-01 PROCEDURE — 96415 CHEMO IV INFUSION ADDL HR: CPT | Performed by: INTERNAL MEDICINE

## 2018-05-01 PROCEDURE — 36416 COLLJ CAPILLARY BLOOD SPEC: CPT | Performed by: FAMILY MEDICINE

## 2018-05-01 PROCEDURE — 96375 TX/PRO/DX INJ NEW DRUG ADDON: CPT | Performed by: INTERNAL MEDICINE

## 2018-05-01 PROCEDURE — 83036 HEMOGLOBIN GLYCOSYLATED A1C: CPT | Performed by: FAMILY MEDICINE

## 2018-05-01 PROCEDURE — 99607 MTMS BY PHARM ADDL 15 MIN: CPT | Mod: U4 | Performed by: PHARMACIST

## 2018-05-01 RX ORDER — DIPHENHYDRAMINE HCL 25 MG
25 CAPSULE ORAL ONCE
Status: CANCELLED
Start: 2018-05-01 | End: 2018-05-01

## 2018-05-01 RX ORDER — METHYLPREDNISOLONE SODIUM SUCCINATE 125 MG/2ML
125 INJECTION, POWDER, LYOPHILIZED, FOR SOLUTION INTRAMUSCULAR; INTRAVENOUS ONCE
Status: COMPLETED | OUTPATIENT
Start: 2018-05-01 | End: 2018-05-01

## 2018-05-01 RX ORDER — METHYLPREDNISOLONE SODIUM SUCCINATE 125 MG/2ML
125 INJECTION, POWDER, LYOPHILIZED, FOR SOLUTION INTRAMUSCULAR; INTRAVENOUS ONCE
Status: CANCELLED | OUTPATIENT
Start: 2018-05-01 | End: 2018-05-01

## 2018-05-01 RX ORDER — ACETAMINOPHEN 325 MG/1
650 TABLET ORAL ONCE
Status: CANCELLED
Start: 2018-05-01 | End: 2018-05-01

## 2018-05-01 RX ORDER — DULOXETIN HYDROCHLORIDE 20 MG/1
20 CAPSULE, DELAYED RELEASE ORAL EVERY OTHER DAY
COMMUNITY
End: 2018-05-17

## 2018-05-01 RX ORDER — DIPHENHYDRAMINE HCL 25 MG
25 CAPSULE ORAL ONCE
Status: COMPLETED | OUTPATIENT
Start: 2018-05-01 | End: 2018-05-01

## 2018-05-01 RX ORDER — ACETAMINOPHEN 325 MG/1
650 TABLET ORAL ONCE
Status: COMPLETED | OUTPATIENT
Start: 2018-05-01 | End: 2018-05-01

## 2018-05-01 RX ORDER — GINGER ROOT 550 MG
550 CAPSULE ORAL DAILY
COMMUNITY
End: 2020-07-28

## 2018-05-01 RX ADMIN — Medication 25 MG: at 13:15

## 2018-05-01 RX ADMIN — Medication 250 ML: at 13:40

## 2018-05-01 RX ADMIN — ACETAMINOPHEN 650 MG: 325 TABLET ORAL at 13:15

## 2018-05-01 RX ADMIN — METHYLPREDNISOLONE SODIUM SUCCINATE 125 MG: 125 INJECTION INTRAMUSCULAR; INTRAVENOUS at 13:37

## 2018-05-01 NOTE — MR AVS SNAPSHOT
After Visit Summary   5/1/2018    Joel Pineda    MRN: [id number]           Patient Information     Date Of Birth          1975        Visit Information        Provider Department      5/1/2018 1:00 PM [address]        Today's Diagnoses     Ankylosing spondylitis of sacral region (H)    -  1       Follow-ups after your visit        Your next 10 appointments already scheduled     May 08, 2018  9:45 AM CDT   LAB with BK LAB   [facility] ([facility]    [address] [facility] [id number] [phone]           Please do not eat 10-12 hours before your appointment if you are coming in fasting for labs on lipids, cholesterol, or glucose (sugar). This does not apply to pregnant women. Water, hot tea and black coffee (with nothing added) are okay. Do not drink other fluids, diet soda or chew gum.            May 31, 2018  9:20 AM CDT   Return Visit with Oneil Baxter MD   [facility]    [address] [place] [id number] [phone]            Jun 26, 2018  1:00 PM CDT   Level 3 with [address] ([facility])    [address] [place] [id number] [phone]              Who to contact     If you have questions or need follow up information about today's clinic visit or your schedule please contact [facility] directly at [phone].  Normal or non-critical lab and imaging results will be communicated to you by MyChart, letter or phone within 4 business days after the clinic has received the results. If you do not hear from us within 7 days, please contact the clinic through MyChart or phone. If you have a critical or abnormal lab result, we will notify you by phone as soon as possible.  Submit refill requests through [employer] or call your pharmacy and they will forward the  refill request to us. Please allow 3 business days for your refill to be completed.          Additional Information About Your Visit        [employer] Information     [facility] gives you secure access to your electronic health record. If you see a primary care provider, you can also send messages to your care team and make appointments. If you have questions, please call your primary care clinic.  If you do not have a primary care provider, please call [phone] and they will assist you.      [facility] is an electronic gateway that provides easy, online access to your medical records. With [facility], you can request a clinic appointment, read your test results, renew a prescription or communicate with your care team.     To access your existing account, please contact your [facility] or call [phone] for assistance.        Care EveryWhere ID     This is your Care EveryWhere ID. This could be used by other organizations to access your [place] medical records  [id number]        Your Vitals Were     BMI (Body Mass Index)                   39.03 kg/m2            Blood Pressure from Last 3 Encounters:   04/03/18 125/83   03/19/18 117/78   03/12/18 116/74    Weight from Last 3 Encounters:   05/01/18 149.3 kg (329 lb 1.6 oz)   04/03/18 148.1 kg (326 lb 6.4 oz)   03/19/18 (!) 148.8 kg (328 lb)              Today, you had the following     No orders found for display         Today's Medication Changes          These changes are accurate as of 5/1/18 11:59 PM.  If you have any questions, ask your nurse or doctor.               These medicines have changed or have updated prescriptions.        Dose/Directions    DULoxetine 20 MG EC capsule   Commonly known as:  CYMBALTA   This may have changed:  Another medication with the same name was removed. Continue taking this medication, and follow the directions you see here.   Changed by:  Radha Mcdonald [facility]        Dose:  20 mg   Take 20 mg by  mouth every other day   Refills:  0                Primary Care Provider Office Phone # Fax #    Ksenia Shady Lyles [phone] [address] [facility] [address]        Goals        General    I will continue to attend Psychiatry appointments for medication management, 1/14/2014 (pt-stated)     Notes - Note edited  8/24/2016  4:04 PM by Dalila Cavazos LSW    As of today's date 8/24/2016 goal is met at 51 - 75%.   Goal Status:  Ongoing Sees  Therapist every other week and Psych PA every 3 weeks.  As of today's date 5/25/2016 goal is met at 51 - 75%.   Goal Status:  Showing progress-  Meeting with Psych PA for second time tomorrow  To review meds As of today's date 4/11/2016 goal is met at 51 - 75%.   Goal Status:  Showing progress  As of today's date 1/14/2014 goal is met at 51 - 75%.   Goal Status:  Active        I will schedule therapy with new counselor, 1/14/2014 (pt-stated)     Notes - Note edited  5/25/2016  1:54 PM by Dalila Cavazos LSW    As of today's date 5/25/2016 goal is met at 76 - 100%.   Goal Status:  Ongoing New therapist and Psych PA on a regular basis  As of today's date 5/10/2016 goal is met at 76 - 100%.   Goal Status:  Ongoing met with new therapist at [facility]  As of today's date 4/11/2016 goal is met at 51 - 75%.   Goal Status:  Ongoing meets with therapist regularly  As of today's date 1/14/2014 goal is met at 0 - 25%.   Goal Status:  Active        Psychosocial I need some help with cleaning (pt-stated)     Notes - Note edited  6/23/2016  1:57 PM by Dalila Cavazos LSW    As of today's date 6/23/2016 goal is met at 26 - 50%.   Goal Status:  Showing progress met with [facility]. Has not yet made a decision  As of today's date 5/25/2016 goal is met at 0 - 25%.   Goal Status:  Ongoing agency had to reschedule appt.  As of today's date 5/10/2016 goal is met at 0 - 25%.   Goal Status:  Active referral to [facility]        Equal Access to Services      ANNA CANO : Hadii aad ku moe Darby, waaxda luqadaha, qaybta kaalmada adejenny, isabell melissa rachellmani holly fernandoha shankar . So [facility] [phone].    ATENCIÓN: Si habla español, tiene a faustin disposición servicios gratuitos de asistencia lingüística. Llame al [phone].    We comply with applicable federal civil rights laws and [place] laws. We do not discriminate on the basis of race, color, national origin, age, disability, sex, sexual orientation, or gender identity.            Thank you!     Thank you for choosing [facility]  for your care. Our goal is always to provide you with excellent care. Hearing back from our patients is one way we can continue to improve our services. Please take a few minutes to complete the written survey that you may receive in the mail after your visit with us. Thank you!             Your Updated Medication List - Protect others around you: Learn how to safely use, store and throw away your medicines at www.disposemymeds.org.          This list is accurate as of 5/1/18 11:59 PM.  Always use your most recent med list.                   Brand Name Dispense Instructions for use Diagnosis    acetaminophen 500 MG Caps     60 capsule    Take 500 mg by mouth every 4 hours as needed        albuterol 108 (90 Base) MCG/ACT Inhaler    PROAIR HFA/PROVENTIL HFA/VENTOLIN HFA     Inhale 2 puffs into the lungs every 4 hours as needed        ALPRAZolam 1 MG 24 hr tablet    XANAX XR     Take 1 mg by mouth every morning        atorvastatin 40 MG tablet    LIPITOR    90 tablet    TAKE 1 TABLET (40 MG) BY MOUTH DAILY    Mixed hyperlipidemia       celecoxib 200 MG capsule    celeBREX    180 capsule    TAKE 1 CAPSULE BY MOUTH TWICE DAILY AS NEEDED FORMODERATE PAIN    Chronic midline low back pain without sciatica       cetirizine 10 MG tablet    zyrTEC    30 tablet    Take 1 tablet (10 mg) by mouth At Bedtime    Cough       cyanocobalamin 1000 MCG/ML injection    VITAMIN B12     10 mL    Inject 1 mL (1,000 mcg) into the muscle every 30 days    B12 deficiency       DULoxetine 20 MG EC capsule    CYMBALTA     Take 20 mg by mouth every other day        EPINEPHrine 0.3 MG/0.3ML injection 2-pack    EPIPEN/ADRENACLICK/or ANY BX GENERIC EQUIV    0.6 mL    Inject 0.3 mLs (0.3 mg) into the muscle once as needed for anaphylaxis    Food allergy       FISH OIL PO           ginger root 550 MG Caps capsule      Take 550 mg by mouth Up to three times daily        hydrOXYzine 50 MG tablet    ATARAX     Take 50 mg by mouth daily        INFLIXIMAB IV           lamoTRIgine 25 MG tablet    LaMICtal     Take 200 mg by mouth daily        levothyroxine 75 MCG tablet    SYNTHROID/LEVOTHROID    90 tablet    Take 1 tablet (75 mcg) by mouth every morning    Acquired hypothyroidism       loperamide 2 MG capsule    IMODIUM     Take 2 mg by mouth 4 times daily as needed for diarrhea        metoprolol succinate 200 MG 24 hr tablet    TOPROL-XL     Take 200 mg by mouth daily        omeprazole 20 MG tablet      Take 20 mg by mouth daily        order for DME     12 each    Equipment being ordered: 1 ml tuberculin syringes to be used for Vitamin B12 injections.    Vitamin B12 deficiency (non anaemic)       order for DME     1 Units    SI-loc belt    SI (sacroiliac) joint dysfunction       order for DME      [employer]ironics REMSTAR 60 Series Auto CPAP 10-12 cm H2O, Wisp nasal mask w/a large cushion and a chinstrap        oxyCODONE IR 5 MG tablet    ROXICODONE    35 tablet    Take 1-2 tablets (5-10 mg) by mouth every 6 hours as needed for pain (maximum 4 tablet(s) per day)    Facet arthropathy       pregabalin 75 MG capsule    LYRICA    60 capsule    Take 1 capsule (75 mg) by mouth 2 times daily    Chronic midline low back pain without sciatica       ramipril 5 MG capsule    ALTACE    90 capsule    TAKE 1 CAPSULE BY MOUTH DAILY    Hypertension goal BP (blood pressure) < 140/90       tiZANidine 4 MG tablet    ZANAFLEX    90 tablet     Take 1 tablet (4 mg) by mouth 3 times daily as needed for muscle spasms    Dorsalgia       vitamin D3 94002 UNITS capsule    CHOLECALCIFEROL    24 capsule    Take one capsule every two weeks.    Vitamin D deficiency

## 2018-05-01 NOTE — MR AVS SNAPSHOT
After Visit Summary   5/1/2018    Joel Pineda    MRN: [id number]           Patient Information     Date Of Birth          1975        Visit Information        Provider Department      5/1/2018 2:00 PM Radha Mcdonald [facility]        Today's Diagnoses     Hyperlipidemia LDL goal <100    -  1    Elevated glucose        Depression with anxiety        Essential hypertension with goal blood pressure less than 140/90        Nausea        Chronic pain syndrome        Ankylosing spondylitis of sacral region (H)          Care Instructions    Recheck cholesterol labs and aA1c          Follow-ups after your visit        Your next 10 appointments already scheduled     May 31, 2018  9:20 AM CDT   Return Visit with Oneil Baxter MD   [facility] ([facility]    [address] [facility] [address] [phone]              Future tests that were ordered for you today     Open Future Orders        Priority Expected Expires Ordered    Lipid panel reflex to direct LDL Fasting Routine 6/5/2018 5/1/2019 5/1/2018    Hemoglobin A1c Routine 6/5/2018 5/1/2019 5/1/2018            Who to contact     If you have questions or need follow up information about today's clinic visit or your schedule please contact [facility] directly at [phone].  Normal or non-critical lab and imaging results will be communicated to you by MyChart, letter or phone within 4 business days after the clinic has received the results. If you do not hear from us within 7 days, please contact the clinic through MyChart or phone. If you have a critical or abnormal lab result, we will notify you by phone as soon as possible.  Submit refill requests through [employer] or call your pharmacy and they will forward the refill request to us. Please allow 3 business days for your refill to be completed.          Additional Information About Your Visit         [employer] Information     [employer] gives you secure access to your electronic health record. If you see a primary care provider, you can also send messages to your care team and make appointments. If you have questions, please call your primary care clinic.  If you do not have a primary care provider, please call [phone] and they will assist you.        Care EveryWhere ID     This is your Care EveryWhere ID. This could be used by other organizations to access your [place] medical records  [id number]         Blood Pressure from Last 3 Encounters:   04/03/18 125/83   03/19/18 117/78   03/12/18 116/74    Weight from Last 3 Encounters:   05/01/18 329 lb 1.6 oz (149.3 kg)   04/03/18 326 lb 6.4 oz (148.1 kg)   03/19/18 (!) 328 lb (148.8 kg)                 Today's Medication Changes          These changes are accurate as of 5/1/18  2:57 PM.  If you have any questions, ask your nurse or doctor.               These medicines have changed or have updated prescriptions.        Dose/Directions    DULoxetine 20 MG EC capsule   Commonly known as:  CYMBALTA   This may have changed:  Another medication with the same name was removed. Continue taking this medication, and follow the directions you see here.   Changed by:  Radha Mcdonald, [facility]        Dose:  20 mg   Take 20 mg by mouth every other day   Refills:  0                Primary Care Provider Office Phone # Fax #    Ksenia Shady Lyles [phone] [address] [facility] [address]        Goals        General    I will continue to attend Psychiatry appointments for medication management, 1/14/2014 (pt-stated)     Notes - Note edited  8/24/2016  4:04 PM by Dalila Cavazos LSW    As of today's date 8/24/2016 goal is met at 51 - 75%.   Goal Status:  Ongoing Sees  Therapist every other week and Psych PA every 3 weeks.  As of today's date 5/25/2016 goal is met at 51 - 75%.   Goal Status:  Showing progress-  Meeting with Psych  PA for second time tomorrow  To review meds As of today's date 4/11/2016 goal is met at 51 - 75%.   Goal Status:  Showing progress  As of today's date 1/14/2014 goal is met at 51 - 75%.   Goal Status:  Active        I will schedule therapy with new counselor, 1/14/2014 (pt-stated)     Notes - Note edited  5/25/2016  1:54 PM by Dalila Cavazos LSW    As of today's date 5/25/2016 goal is met at 76 - 100%.   Goal Status:  Ongoing New therapist and Psych PA on a regular basis  As of today's date 5/10/2016 goal is met at 76 - 100%.   Goal Status:  Ongoing met with new therapist at [facility]  As of today's date 4/11/2016 goal is met at 51 - 75%.   Goal Status:  Ongoing meets with therapist regularly  As of today's date 1/14/2014 goal is met at 0 - 25%.   Goal Status:  Active        Psychosocial I need some help with cleaning (pt-stated)     Notes - Note edited  6/23/2016  1:57 PM by Dalila Cavazos LSW    As of today's date 6/23/2016 goal is met at 26 - 50%.   Goal Status:  Showing progress met with [facility]. Has not yet made a decision  As of today's date 5/25/2016 goal is met at 0 - 25%.   Goal Status:  Ongoing agency had to reschedule appt.  As of today's date 5/10/2016 goal is met at 0 - 25%.   Goal Status:  Active referral to [facility]        Equal Access to Services     [facility] AH: Hadii aad ku hadasho Soomaali, waaxda luqadaha, qaybta kaalmada adeegyada, isabell shankar . So [facility] [phone].    ATENCIÓN: Si habla español, tiene a faustin disposición servicios gratuitos de asistencia lingüística. Llame al [phone].    We comply with applicable federal civil rights laws and [place] laws. We do not discriminate on the basis of race, color, national origin, age, disability, sex, sexual orientation, or gender identity.            Thank you!     Thank you for choosing [facility]  for your care. Our goal is always to provide you with excellent care. Hearing back from  our patients is one way we can continue to improve our services. Please take a few minutes to complete the written survey that you may receive in the mail after your visit with us. Thank you!             Your Updated Medication List - Protect others around you: Learn how to safely use, store and throw away your medicines at www.disposemymeds.org.          This list is accurate as of 5/1/18  2:57 PM.  Always use your most recent med list.                   Brand Name Dispense Instructions for use Diagnosis    acetaminophen 500 MG Caps     60 capsule    Take 500 mg by mouth every 4 hours as needed        albuterol 108 (90 Base) MCG/ACT Inhaler    PROAIR HFA/PROVENTIL HFA/VENTOLIN HFA     Inhale 2 puffs into the lungs every 4 hours as needed        ALPRAZolam 1 MG 24 hr tablet    XANAX XR     Take 1 mg by mouth every morning        atorvastatin 40 MG tablet    LIPITOR    90 tablet    TAKE 1 TABLET (40 MG) BY MOUTH DAILY    Mixed hyperlipidemia       celecoxib 200 MG capsule    celeBREX    180 capsule    TAKE 1 CAPSULE BY MOUTH TWICE DAILY AS NEEDED FORMODERATE PAIN    Chronic midline low back pain without sciatica       cetirizine 10 MG tablet    zyrTEC    30 tablet    Take 1 tablet (10 mg) by mouth At Bedtime    Cough       cyanocobalamin 1000 MCG/ML injection    VITAMIN B12    10 mL    Inject 1 mL (1,000 mcg) into the muscle every 30 days    B12 deficiency       DULoxetine 20 MG EC capsule    CYMBALTA     Take 20 mg by mouth every other day        EPINEPHrine 0.3 MG/0.3ML injection 2-pack    EPIPEN/ADRENACLICK/or ANY BX GENERIC EQUIV    0.6 mL    Inject 0.3 mLs (0.3 mg) into the muscle once as needed for anaphylaxis    Food allergy       FISH OIL PO           ginger root 550 MG Caps capsule      Take 550 mg by mouth Up to three times daily        hydrOXYzine 50 MG tablet    ATARAX     Take 50 mg by mouth daily        INFLIXIMAB IV           lamoTRIgine 25 MG tablet    LaMICtal     Take 200 mg by mouth daily         levothyroxine 75 MCG tablet    SYNTHROID/LEVOTHROID    90 tablet    Take 1 tablet (75 mcg) by mouth every morning    Acquired hypothyroidism       loperamide 2 MG capsule    IMODIUM     Take 2 mg by mouth 4 times daily as needed for diarrhea        metoprolol succinate 200 MG 24 hr tablet    TOPROL-XL     Take 200 mg by mouth daily        omeprazole 20 MG tablet      Take 20 mg by mouth daily        order for DME     12 each    Equipment being ordered: 1 ml tuberculin syringes to be used for Vitamin B12 injections.    Vitamin B12 deficiency (non anaemic)       order for DME     1 Units    SI-loc belt    SI (sacroiliac) joint dysfunction       order for DME      Respironics REMSTAR 60 Series Auto CPAP 10-12 cm H2O, Wisp nasal mask w/a large cushion and a chinstrap        oxyCODONE IR 5 MG tablet    ROXICODONE    35 tablet    Take 1-2 tablets (5-10 mg) by mouth every 6 hours as needed for pain (maximum 4 tablet(s) per day)    Facet arthropathy       pregabalin 75 MG capsule    LYRICA    60 capsule    Take 1 capsule (75 mg) by mouth 2 times daily    Chronic midline low back pain without sciatica       ramipril 5 MG capsule    ALTACE    90 capsule    TAKE 1 CAPSULE BY MOUTH DAILY    Hypertension goal BP (blood pressure) < 140/90       tiZANidine 4 MG tablet    ZANAFLEX    90 tablet    Take 1 tablet (4 mg) by mouth 3 times daily as needed for muscle spasms    Dorsalgia       vitamin D3 01176 UNITS capsule    CHOLECALCIFEROL    24 capsule    Take one capsule every two weeks.    Vitamin D deficiency

## 2018-05-01 NOTE — PROGRESS NOTES
"SUBJECTIVE/OBJECTIVE:                           Joel Pineda is a 42 year old male seen for a follow-up for Medication Therapy Management in the infusion center.  He was referred to me from Ksenia Lyles.     Chief Complaint: Follow-up from visit on 3/19/2018.    Allergies/ADRs: Reviewed in Epic  Tobacco: No tobacco use  Alcohol: none    Ankylosing Spondylitis: Current medications include Remicade 800 mg IV once, celecoxib 200 mg bid. Patient feels celecoxib bid is helping. Patient is receiving third Remicade infusion today. He does feel the remicade has been helping as the area of pain is less. Patient would like to discuss with Dr. Batxer monitoring of liver as he is aware remicade can affect liver function.    Hypertension: Current medications include ramipril 5mg daily, metoprolol XL 200mg daily. Patient has not been monitoring blood pressures at home.  Patient reports no current medication side effects. Patient is concerned that when he starts parnate that his blood pressures might go low.    Mood/Anxiety: current therapy includes lamictal 200mg daily, Xanax XR 1mg daily, duloxetine 20 mg ever other day. Patient is tapering off of duloxetine and then will have a 2 week washout period before starting parnate. Patient is having a hard time tapering off of duloxetine; he gets \"brain zaps\". Patient follows up with psychiatry on Monday. They are working towards EMDR.  Patient sees his therapist at [facility] every other week or so. Patient was attending a bi-weekly men's depression group ([facility]), but he is unsure if this is the right fit form him.    Pain: current therapy includes APAP 1000mg in the am and 500mg in the pm as needed, celebrex 200mg bid, lyrica 75mg bid.     Hyperlipidemia: Current therapy includes atorvastatin 40mg once daily and Fenofibrate 145mg once daily was discontinued due to effect on kidney function. Patient has been off of fenofibrate for about 6 weeks and there has been an " improvement in his kidney function.  Patient has not been taking fish oil because he thought it might be contributing to diarrhea.   Pt reports no significant myalgias or other side effects.   The 10-year ASCVD risk score (Vanna MOISE Jr, et al., 2013) is: 5.8%    Values used to calculate the score:      Age: 42 years      Sex: Male      Is Non- : No      Diabetic: Yes      Tobacco smoker: No      Systolic Blood Pressure: 125 mmHg      Is BP treated: Yes      HDL Cholesterol: 31 mg/dL      Total Cholesterol: 178 mg/dL     Nausea: current therapy includes libia root 550mg up to three times daily. Patient discontinued ondansetron because of the interaction with parnate. Libia root has been effective it just doesn't last as long as ondansetron.    Elevated Glucose: last non-fasting labs had an elevated glucose of 135mg/dL. Patient would like to have his A1c rechecked.    Current labs include:  Today's Vitals: There were no vitals taken for this visit.-see OV for today  BP Readings from Last 3 Encounters:   04/03/18 125/83   03/19/18 117/78   03/12/18 116/74     Lab Results   Component Value Date    A1C 5.8 01/18/2018   .  Lab Results   Component Value Date    CHOL 178 01/18/2018     Lab Results   Component Value Date    TRIG 319 01/18/2018     Lab Results   Component Value Date    HDL 31 01/18/2018     Lab Results   Component Value Date    LDL 83 01/18/2018       Liver Function Studies -   Recent Labs   Lab Test  03/12/18   1559  01/03/17   0825   PROTTOTAL   --   7.2   ALBUMIN  4.1  4.2   BILITOTAL   --   0.3   ALKPHOS   --   66   AST   --   18   ALT   --   27       Lab Results   Component Value Date    UCRR 99 03/12/2018    MICROL <5 01/18/2018    UMALCR Unable to calculate due to low value 01/18/2018       Last Basic Metabolic Panel:  Lab Results   Component Value Date     04/19/2018      Lab Results   Component Value Date    POTASSIUM 3.9 04/19/2018     Lab Results   Component Value Date     CHLORIDE 104 04/19/2018     Lab Results   Component Value Date    BUN 12 04/19/2018     Lab Results   Component Value Date    CR 0.97 04/19/2018     GFR Estimate   Date Value Ref Range Status   04/19/2018 84 >60 mL/min/1.7m2 Final     Comment:     Non  GFR Calc   03/12/2018 60 (L) >60 mL/min/1.7m2 Final     Comment:     Non  GFR Calc   01/18/2018 73 >60 mL/min/1.7m2 Final     Comment:     Non  GFR Calc     GFR Estimate If Black   Date Value Ref Range Status   04/19/2018 >90 >60 mL/min/1.7m2 Final     Comment:      GFR Calc   03/12/2018 72 >60 mL/min/1.7m2 Final     Comment:      GFR Calc   01/18/2018 88 >60 mL/min/1.7m2 Final     Comment:      GFR Calc       Most Recent Immunizations   Administered Date(s) Administered     Influenza (IIV3) PF 09/09/2013     Influenza Vaccine IM 3yrs+ 4 Valent IIV4 10/11/2016     Pneumo Conj 13-V (2010&after) 10/02/2015     Pneumococcal 23 valent 10/11/2016     TD (ADULT, 7+) 10/04/2002     TDAP Vaccine (Adacel) 07/16/2010       ASSESSMENT:                             Current medications were reviewed today.     Medication Adherence: no issues identified    Ankylosing Spondylitis: stable    Hypertension: stable. Patient is meeting goal <140/90mmHg. Patient would benefit from checking blood pressures at home when starting parnate    Mood/Anxiety: Stable.  Patient in close follow-up with psychiatry.    Pain: Stable.    Hyperlipidemia: Stable. Patient may benefit from updated lipid labs now that he is off of fenofibrate.     Nausea: stable    Elevated Glucose: needs improvement; patient may benefit from updated A1c.    PLAN:                            No medication changes made today.  Orders placed for Lipids and A1c.     I spent 30 minutes with this patient today. All changes were made via collaborative practice agreement with Ksenia Lyles. A copy of the visit note was provided  to the patient's primary care provider.    Will follow up in 8 weeks.    The patient was sent via [employer] a summary of these recommendations as an after visit summary.     Radha Mcdonald, Pharm.D, BCACP  Medication Therapy Management Pharmacist

## 2018-05-01 NOTE — PROGRESS NOTES
Infusion Nursing Note:  Joel Pineda presents today for Remicade; week 6.    Patient seen by provider today: No   present during visit today: Not Applicable.    Note:   ~~~ NOTE: If the patient answers yes to any of the questions below, hold the infusion and contact ordering provider or on-call provider.    1. Have you recently had an elevated temperature, fever, chills, productive cough, coughing for 3 weeks or longer or hemoptysis,  abnormal vital signs, night sweats,  chest pain or have you noticed a decrease in your appetite, unexplained weight loss or fatigue? No  2. Do you have any open wounds or new incisions? No  3. Do you have any recent or upcoming hospitalizations, surgeries or dental procedures? No  4. Do you currently have or recently have had any signs of illness or infection or are you on any antibiotics? No  5. Have you had any new, sudden or worsening abdominal pain? No  6. Have you or anyone in your household received a live vaccination in the past 4 weeks? Please note:  No live vaccines while on biologic/chemotherapy until 6 months after the last treatment.  Patient can receive the flu vaccine (shot only) and the pneumovax.  It is optimal for the patient to get these vaccines mid cycle, but they can be given at any time as long as it is not on the day of the infusion. No  7. Have you recently been diagnosed with any new nervous system diseases (ie. Multiple sclerosis, Guillain Hudson, seizures, neurological changes) or cancer diagnosis? Are you on any form of radiation or chemotherapy? No  8. Are you pregnant or breast feeding or do you have plans of pregnancy in the future? No  9. Have you been having any signs of worsening depression or suicidal ideations?  (benlysta only) No  10. Have there been any other new onset medical symptoms? No      Intravenous Access:  Peripheral IV placed.    Treatment Conditions:  Lab Results   Component Value Date    HGB 15.2 01/18/2018     Lab Results    Component Value Date    WBC 8.5 01/18/2018      Lab Results   Component Value Date    ANEU 4.1 01/18/2018     Lab Results   Component Value Date     01/18/2018      Lab Results   Component Value Date     04/19/2018                   Lab Results   Component Value Date    POTASSIUM 3.9 04/19/2018           Lab Results   Component Value Date    MAG 2.1 08/19/2013            Lab Results   Component Value Date    CR 0.97 04/19/2018                   Lab Results   Component Value Date    ALYCE 9.4 04/19/2018                Lab Results   Component Value Date    BILITOTAL 0.3 01/03/2017           Lab Results   Component Value Date    ALBUMIN 4.1 03/12/2018                    Lab Results   Component Value Date    ALT 27 01/03/2017           Lab Results   Component Value Date    AST 18 01/03/2017       Post Infusion Assessment:  Patient tolerated infusion without incident.  No evidence of extravasations.  Access discontinued per protocol.    Administrations This Visit     0.9% sodium chloride BOLUS     Admin Date Action Dose Route Administered By             05/01/2018 New Bag 250 mL Intravenous Sushma Lozano RN                    acetaminophen (TYLENOL) tablet 650 mg     Admin Date Action Dose Route Administered By             05/01/2018 Given 650 mg Oral Sushma Lozano RN                    diphenhydrAMINE (BENADRYL) capsule 25 mg     Admin Date Action Dose Route Administered By             05/01/2018 Given 25 mg Oral Sushma Lozano RN                    inFLIXimab (REMICADE) 700 mg in sodium chloride 0.9 % 345 mL infusion     Admin Date Action Dose Rate Route Administered By          05/01/2018 New Bag 700 mg 172.5 mL/hr Intravenous Sushma Lozano RN                   methylPREDNISolone sodium succinate (solu-MEDROL) injection 125 mg     Admin Date Action Dose Route Administered By             05/01/2018 Given 125 mg Intravenous Sushma Lozano RN                          Discharge Plan:   Schedule reviewed  with patient and/or family.  Patient will return 6/26 for next appointment.  Patient discharged in stable condition accompanied by: self.  Departure Mode: Ambulatory.    Sushma Lozano RN

## 2018-05-07 ENCOUNTER — TELEPHONE (OUTPATIENT)
Dept: PALLIATIVE MEDICINE | Facility: CLINIC | Age: 43
End: 2018-05-07

## 2018-05-07 ENCOUNTER — MYC REFILL (OUTPATIENT)
Dept: FAMILY MEDICINE | Facility: CLINIC | Age: 43
End: 2018-05-07

## 2018-05-07 DIAGNOSIS — M47.819 FACET ARTHROPATHY: ICD-10-CM

## 2018-05-07 DIAGNOSIS — M53.3 SI (SACROILIAC) JOINT DYSFUNCTION: Primary | ICD-10-CM

## 2018-05-07 NOTE — TELEPHONE ENCOUNTER
Message from [employer]:  Original authorizing provider: SHERWIN Ferraro would like a refill of the following medications:  oxyCODONE IR (ROXICODONE) 5 MG tablet [Faith Camacho PA-C]    Preferred pharmacy: [facility]    Comment:

## 2018-05-07 NOTE — TELEPHONE ENCOUNTER
Requested Prescriptions   Pending Prescriptions Disp Refills     oxyCODONE IR (ROXICODONE) 5 MG tablet 35 tablet 0     Sig: Take 1-2 tablets (5-10 mg) by mouth every 6 hours as needed for pain (maximum 4 tablet(s) per day)    There is no refill protocol information for this order        oxyCODONE IR (ROXICODONE) 5 MG tablet      Last Written Prescription Date:  3/29/18  Last Fill Quantity: 35,   # refills: 0  Last Office Visit: 2/27/18  Future Office visit:    Next 5 appointments (look out 90 days)     May 31, 2018  9:20 AM CDT   [facility]    [address]  [place] [address] [phone]                   Routing refill request to provider for review/approval because:  Drug not on the [facility], [facility] or [facility] refill protocol or controlled substance

## 2018-05-07 NOTE — TELEPHONE ENCOUNTER
Pt requesting a Right SI joint injection.  Please place order if applicable and route back to scheduling.           Liliana ANTHONY    [facility]

## 2018-05-08 ENCOUNTER — TELEPHONE (OUTPATIENT)
Dept: PALLIATIVE MEDICINE | Facility: CLINIC | Age: 43
End: 2018-05-08

## 2018-05-08 DIAGNOSIS — E78.5 HYPERLIPIDEMIA LDL GOAL <100: ICD-10-CM

## 2018-05-08 LAB
CHOLEST SERPL-MCNC: 178 MG/DL
HDLC SERPL-MCNC: 27 MG/DL
LDLC SERPL CALC-MCNC: ABNORMAL MG/DL
LDLC SERPL DIRECT ASSAY-MCNC: 56 MG/DL
NONHDLC SERPL-MCNC: 151 MG/DL
TRIGL SERPL-MCNC: 998 MG/DL

## 2018-05-08 PROCEDURE — 36415 COLL VENOUS BLD VENIPUNCTURE: CPT | Performed by: FAMILY MEDICINE

## 2018-05-08 PROCEDURE — 83721 ASSAY OF BLOOD LIPOPROTEIN: CPT | Mod: 59 | Performed by: FAMILY MEDICINE

## 2018-05-08 PROCEDURE — 80061 LIPID PANEL: CPT | Performed by: FAMILY MEDICINE

## 2018-05-08 RX ORDER — OXYCODONE HYDROCHLORIDE 5 MG/1
5-10 TABLET ORAL EVERY 6 HOURS PRN
Qty: 35 TABLET | Refills: 0 | Status: SHIPPED | OUTPATIENT
Start: 2018-05-08 | End: 2018-06-02

## 2018-05-08 NOTE — TELEPHONE ENCOUNTER
Pre-screening Questions for Radiology Injections:    Injection to be done at which interventional clinic site? [facility] [place]   If [place], instruct patient to arrive 30 minutes before the scheduled appointment time.     Procedure ordered by     Procedure ordered?  SI Joint Injection    What insurance would patient like us to bill for this procedure? HP      Worker's comp or MVA (motor vehicle accident) -Any injection DO NOT SCHEDULE and route to Liliana Vicente.      [facility] - For SI joint injections, DO NOT SCHEDULE and route Lorraine Scott. [employer] FREEDOM NO PA REQUIRED EFFECTIVE 11/1/2017      [employer]- MBB's must be scheduled at LEAST two weeks apart      [employer] - Any injection besides hip/shoulder/knee joint DO NOT SCHEDULE and route to Lorraine Scott. She will obtain PA and call pt back to schedule procedure or notify pt of denial.        [employer]-Route to Lorraine for review    Any chance of pregnancy? Not Applicable   If YES, do NOT schedule and route to RN pool    Is an  needed? No     Patient has a drive home? (mandatory) YES:     Is patient taking any blood thinners (plavix, coumadin, jantoven, warfarin, heparin, pradaxa or dabigatran )? No   If hold needed, do NOT schedule, route to RN pool     Is patient taking any aspirin products? No     If more than 325mg/day do NOT schedule; route to RN pool     For CERVICAL procedures, hold all aspirin products for 6 days.      Does the patient have a bleeding or clotting disorder? No     If YES, okay to schedule AND route to RN nurse pool    **For any patients with platelet count <100, must be forwarded to provider**    Is patient diabetic?  No  If YES, have them bring their glucometer.    Does patient have an active infection or treated for one within the past week? No     Is patient currently taking any antibiotics?  No     For patients on chronic, preventative, or prophylactic antibiotics, procedures  may be scheduled.     For patients on antibiotics for active or recent infection:    Emily Oliver Burton, [place]-antibiotic course must have been completed for 4 days    Is patient currently taking any steroid medications? (i.e. Prednisone, Medrol)  No     For patients on steroid medications:    Emily Oliver Burton, [place]-steroid course must have been completed for 4 days    Reviewed with patient:  If you are started on any steroids or antibiotics between now and your appointment, you must contact us because it may affect our ability to perform your procedure.  Yes    Is patient actively being treated for cancer or immunocompromised? No  If YES, do NOT schedule and route to RN pool     Are you able to get on and off an exam table with minimal or no assistance? Yes  If NO, do NOT schedule and route to RN pool    Are you able to roll over and lay on your stomach with minimal or no assistance? Yes  If NO, do NOT schedule and route to RN pool     Any allergies to contrast dye, iodine, shellfish, or numbing and steroid medications? Yes - contrast dye  If YES, route to RN pool AND add allergy information to appointment notes    Allergies: Banana; Nitroglycerin; Penicillins; Provigil [modafinil]; Ciprofloxacin; Gadolinium; Dulera; Dye [contrast dye]; Levaquin; Levofloxacin; Neurontin [gabapentin]; Nortriptyline; Varicella virus vaccine live; Ciprofloxacin; Flagyl [metronidazole hcl]; Latex; Metronidazole; and No clinical screening - see comments      Has the patient had a flu shot or any other vaccinations within 7 days before or after the procedure.  No     Does patient have an MRI/CT?  Not Applicable  (SI joint, hip injections, lumbar sympathetic blocks, and stellate ganglion blocks do not require an MRI)    Was the MRI done w/in the last 3 years?  NA    Was MRI done at [place]?       If not, where was it done? N/A       If [facility] was not done at [facility], [facility] do NOT  schedule and route to nursing.  If pt has an imaging disc, the injection may be scheduled but pt has to bring disc to appt. If they show up w/out disc the injection cannot be done    Reminders (please tell patient if applicable):       Instructed pt to arrive 30 minutes early for IV start if this is for a cervical procedure, ALL sympathetic (stellate ganglion, hypogastric, or lumbar sympathetic block) and all sedation procedures (RFA, spinal cord stimulation trials).  Not Applicable   -IVs are not routinely placed for Dr. Capps cervical cases   -Dr. Montgomery: IVs for cervical ESIs and cervical TBDs (not CMBBs/facet inj)      If NPO for sedation, informed patient that it is okay to take medications with sips of water (except if they are to hold blood thinners).  Not Applicable   *DO take blood pressure medication if it is prescribed*      If this is for a cervical SAMINA, informed patient that aspirin needs to be held for 6 days.   Not Applicable      For all patients not having spinal cord stimulator (SCS) trials or radiofrequency ablations (RFAs), informed patient:    IV sedation is not provided for this procedure.  If you feel that an oral anti-anxiety medication is needed, you can discuss this further with your referring provider or primary care provider.  The Pain Clinic provider will discuss specifics of what the procedure includes at your appointment.  Most procedures last 10-20 minutes.  We use numbing medications to help with any discomfort during the procedure.  Not Applicable      Do not schedule procedures requiring IV placement in the first appointment of the day or first appointment after lunch. Do NOT schedule at 0745, 0815 or 1245.       For patients 85 or older we recommend having an adult stay w/ them for the remainder of the day.       Does the patient have any questions?  NO  Ruth Faith  [facility]

## 2018-05-08 NOTE — TELEPHONE ENCOUNTER
Spoke with Tito and to verify that he only needs the right done. Order pended. Routing to Dr. Diaz to review request.    JAVON GarciaN, RN  Care Coordinator  [facility]

## 2018-05-08 NOTE — TELEPHONE ENCOUNTER
Called patient to schedule SI Joint Injection. Patient was driving and stated that he would call us back.      Farida Avalos    [facility]

## 2018-05-09 ENCOUNTER — TELEPHONE (OUTPATIENT)
Dept: PALLIATIVE MEDICINE | Facility: CLINIC | Age: 43
End: 2018-05-09

## 2018-05-09 DIAGNOSIS — M53.3 SI (SACROILIAC) JOINT DYSFUNCTION: Primary | ICD-10-CM

## 2018-05-09 NOTE — TELEPHONE ENCOUNTER
VM left today at: 10:52 am      Pt has an upcoming SI joint injection scheduled on 5/23.   He is requesting a double sided injection based on how his pain has been progressing.   [phone] (home)       Liliana ANTHONY    [facility] [place]

## 2018-05-09 NOTE — PROGRESS NOTES
Tito,  Cholesterol looks to be holding steady, other than a sudden jump in your triglycerides.  This is a measure that tends to be very sensitive to diet and exercise.  So keep up on these and let's recheck in like 6 months.  JA Lyles M.D.

## 2018-05-10 NOTE — TELEPHONE ENCOUNTER
Pt has [employer] which sometimes may need prior authorizations for this.    Lorraine pt is scheduled for a R sacroiliac joint injection on 5/23/18.  If bilateral is indicated will insurance cover?    Eloisa Oconnell RN-BSN  [facility]

## 2018-05-14 NOTE — TELEPHONE ENCOUNTER
HP requires a PA for this injection, there is no authorization so I will need to submit this ASAP. Should I request for bilateral? If so, please place a new order        Lorraine CHAVARRIA    [facility]

## 2018-05-15 NOTE — TELEPHONE ENCOUNTER
Order has been corrected to Bilateral. Patient will do PT as it is required by his insurance company and he has not done PT in about 2 years. He would like to do PT at [facility] with Devan Doherty, PT. Order is pended. Routing to Dr. Diaz to review.    Radha Bustos, JAVONN, RN  Care Coordinator  [facility] [place]

## 2018-05-17 ENCOUNTER — OFFICE VISIT (OUTPATIENT)
Dept: FAMILY MEDICINE | Facility: CLINIC | Age: 43
End: 2018-05-17
Payer: COMMERCIAL

## 2018-05-17 VITALS
RESPIRATION RATE: 20 BRPM | OXYGEN SATURATION: 97 % | DIASTOLIC BLOOD PRESSURE: 81 MMHG | HEART RATE: 128 BPM | WEIGHT: 315 LBS | HEIGHT: 77 IN | SYSTOLIC BLOOD PRESSURE: 133 MMHG | TEMPERATURE: 99.2 F | BODY MASS INDEX: 37.19 KG/M2

## 2018-05-17 DIAGNOSIS — I10 ESSENTIAL HYPERTENSION WITH GOAL BLOOD PRESSURE LESS THAN 140/90: Chronic | ICD-10-CM

## 2018-05-17 DIAGNOSIS — E66.01 MORBID OBESITY (H): ICD-10-CM

## 2018-05-17 DIAGNOSIS — T78.40XA ALLERGIC REACTION, INITIAL ENCOUNTER: Primary | ICD-10-CM

## 2018-05-17 DIAGNOSIS — K76.0 FATTY INFILTRATION OF LIVER: ICD-10-CM

## 2018-05-17 DIAGNOSIS — F41.9 ANXIETY: ICD-10-CM

## 2018-05-17 LAB
ALBUMIN SERPL-MCNC: 4.2 G/DL (ref 3.4–5)
ALP SERPL-CCNC: 106 U/L (ref 40–150)
ALT SERPL W P-5'-P-CCNC: 48 U/L (ref 0–70)
AST SERPL W P-5'-P-CCNC: 40 U/L (ref 0–45)
BILIRUB DIRECT SERPL-MCNC: <0.1 MG/DL (ref 0–0.2)
BILIRUB SERPL-MCNC: 0.4 MG/DL (ref 0.2–1.3)
PROT SERPL-MCNC: 7.7 G/DL (ref 6.8–8.8)

## 2018-05-17 PROCEDURE — 80076 HEPATIC FUNCTION PANEL: CPT | Performed by: FAMILY MEDICINE

## 2018-05-17 PROCEDURE — 36415 COLL VENOUS BLD VENIPUNCTURE: CPT | Performed by: FAMILY MEDICINE

## 2018-05-17 PROCEDURE — 99214 OFFICE O/P EST MOD 30 MIN: CPT | Performed by: FAMILY MEDICINE

## 2018-05-17 RX ORDER — METOPROLOL SUCCINATE 200 MG/1
200 TABLET, EXTENDED RELEASE ORAL DAILY
Qty: 90 TABLET | Refills: 1 | Status: SHIPPED | OUTPATIENT
Start: 2018-05-17 | End: 2018-10-02

## 2018-05-17 ASSESSMENT — ANXIETY QUESTIONNAIRES
5. BEING SO RESTLESS THAT IT IS HARD TO SIT STILL: SEVERAL DAYS
1. FEELING NERVOUS, ANXIOUS, OR ON EDGE: NEARLY EVERY DAY
2. NOT BEING ABLE TO STOP OR CONTROL WORRYING: NEARLY EVERY DAY
3. WORRYING TOO MUCH ABOUT DIFFERENT THINGS: NEARLY EVERY DAY
IF YOU CHECKED OFF ANY PROBLEMS ON THIS QUESTIONNAIRE, HOW DIFFICULT HAVE THESE PROBLEMS MADE IT FOR YOU TO DO YOUR WORK, TAKE CARE OF THINGS AT HOME, OR GET ALONG WITH OTHER PEOPLE: EXTREMELY DIFFICULT
6. BECOMING EASILY ANNOYED OR IRRITABLE: MORE THAN HALF THE DAYS

## 2018-05-17 ASSESSMENT — PAIN SCALES - GENERAL: PAINLEVEL: NO PAIN (0)

## 2018-05-17 ASSESSMENT — PATIENT HEALTH QUESTIONNAIRE - PHQ9: 5. POOR APPETITE OR OVEREATING: NEARLY EVERY DAY

## 2018-05-17 NOTE — MR AVS SNAPSHOT
After Visit Summary   5/17/2018    Joel Pineda    MRN: [id number]           Patient Information     Date Of Birth          1975        Visit Information        Provider Department      5/17/2018 1:40 PM Cindy Sharp MD [facility]        Today's Diagnoses     Allergic reaction, initial encounter    -  1    Anxiety        Morbid obesity due to hypertriglyceridemia (H)        Fatty infiltration of liver        Essential hypertension with goal blood pressure less than 140/90          Care Instructions    At [facility], we strive to deliver an exceptional experience to you, every time we see you.  If you receive a survey in the mail, please send us back your thoughts. We really do value your feedback.    Based on your medical history, these are the current health maintenance/preventive care services that you are due for (some may have been done at this visit.)  Health Maintenance Due   Topic Date Due     HIV SCREEN (SYSTEM ASSIGNED)  07/01/1991     URINE DRUG SCREEN Q1 YR  10/25/2016     EYE EXAM Q1 YEAR  04/04/2018     PHQ-9 Q3 MONTHS  04/24/2018       Suggested websites for health information:  Www.Reval.com.Reppler : Up to date and easily searchable information on multiple topics.  Www.medlineplus.gov : medication info, interactive tutorials, watch real surgeries online  Www.familydoctor.org : good info from the Academy of Family Physicians  Www.cdc.gov : public health info, travel advisories, epidemics (H1N1)  Www.aap.org : children's health info, normal development, vaccinations  Www.health.state[place] : [place] dept of health, public health issues in [place], N1N1    Your care team:                            Family Medicine Internal Medicine   MD Houston Frances MD Shantel Branch-Fleming, MD Katya Georgiev PA-C Megan Hill, APRN GLORIA Cavazos MD Pediatrics   SHERWIN Hastings, MD Radha Camarena  MD Humaira Draper CNP, MD Deborah Mielke, MD Kim Thein, APRN CNP      Clinic hours: Monday - Thursday 7 am-7 pm; Fridays 7 am-5 pm.   Urgent care: Monday - Friday 11 am-9 pm; Saturday and Sunday 9 am-5 pm.  Pharmacy : Monday -Thursday 8 am-8 pm; Friday 8 am-6 pm; Saturday and Sunday 9 am-5 pm.     Clinic: [phone]   Pharmacy: [phone]              Follow-ups after your visit        Your next 10 appointments already scheduled     May 31, 2018  9:20 AM CDT   Return Visit with Oneil Baxter MD   [facility] ([facility])    [address] [place] [id number]   [phone]            May 31, 2018 11:30 AM CDT   (Arrive by 11:15 AM)   [facility] Orestes Larsonol, PT   [facility] ([facility] [place])    [address] [place] [id number]   [phone]            Jun 07, 2018  2:00 PM CDT   [facility] Orestes Larsonol, PT   [facility] ([facility] [place])    [address] [place] [id number]   [phone]            Jun 14, 2018  2:00 PM CDT   [facility] with Orestes Larsonol, PT   [facility] ([facility] [place])    [address] [place] [id number]   [phone]            Jun 26, 2018  1:00 PM CDT   Level 3 with [address] ([facility])    [id number] [place] [id number] [phone]              Who to contact     If you have questions or need follow up information about today's clinic visit or your schedule please contact [facility] DENISE WARNER directly at [phone].  Normal or non-critical lab and imaging results will be communicated to you by MyChart, letter or phone within 4 business days after the clinic has received the results. If you do not hear from us within 7 days, please contact the clinic through MyChart or phone. If you have a critical or abnormal lab result, we will  "notify you by phone as soon as possible.  Submit refill requests through [employer] or call your pharmacy and they will forward the refill request to us. Please allow 3 business days for your refill to be completed.          Additional Information About Your Visit        [employer] Information     [employer] gives you secure access to your electronic health record. If you see a primary care provider, you can also send messages to your care team and make appointments. If you have questions, please call your primary care clinic.  If you do not have a primary care provider, please call [phone] and they will assist you.        Care EveryWhere ID     This is your Care EveryWhere ID. This could be used by other organizations to access your [facility] medical records  [id number]        Your Vitals Were     Pulse Temperature Respirations Height Pulse Oximetry BMI (Body Mass Index)    128 99.2  F (37.3  C) (Oral) 20 6' 5\" (1.956 m) 97% 39.51 kg/m2       Blood Pressure from Last 3 Encounters:   05/17/18 133/81   04/03/18 125/83   03/19/18 117/78    Weight from Last 3 Encounters:   05/17/18 (!) 333 lb 3.2 oz (151.1 kg)   05/01/18 329 lb 1.6 oz (149.3 kg)   04/03/18 326 lb 6.4 oz (148.1 kg)              We Performed the Following     DEPRESSION ACTION PLAN (DAP)     Hepatic panel (Albumin, ALT, AST, Bili, Alk Phos, TP)          Where to get your medicines      These medications were sent to [id number] [place] - [address]  [address], [place] [facility] [address]     Phone:  [phone]     metoprolol succinate 200 MG 24 hr tablet          Primary Care Provider Office Phone # Fax #    Ksenia Lyles [phone] [address]  [facility] [address]        Goals        General    I will continue to attend Psychiatry appointments for medication management, 1/14/2014 (pt-stated)     Notes - Note edited  8/24/2016  4:04 PM by Dalila Cavazos LSW    As of " today's date 8/24/2016 goal is met at 51 - 75%.   Goal Status:  Ongoing Sees  Therapist every other week and Psych PA every 3 weeks.  As of today's date 5/25/2016 goal is met at 51 - 75%.   Goal Status:  Showing progress-  Meeting with Psych PA for second time tomorrow  To review meds As of today's date 4/11/2016 goal is met at 51 - 75%.   Goal Status:  Showing progress  As of today's date 1/14/2014 goal is met at 51 - 75%.   Goal Status:  Active        I will schedule therapy with new counselor, 1/14/2014 (pt-stated)     Notes - Note edited  5/25/2016  1:54 PM by Dalila Cavazos LSW    As of today's date 5/25/2016 goal is met at 76 - 100%.   Goal Status:  Ongoing New therapist and Psych PA on a regular basis  As of today's date 5/10/2016 goal is met at 76 - 100%.   Goal Status:  Ongoing met with new therapist at [facility]  As of today's date 4/11/2016 goal is met at 51 - 75%.   Goal Status:  Ongoing meets with therapist regularly  As of today's date 1/14/2014 goal is met at 0 - 25%.   Goal Status:  Active        Psychosocial I need some help with cleaning (pt-stated)     Notes - Note edited  6/23/2016  1:57 PM by Dalila Cavazos LSW    As of today's date 6/23/2016 goal is met at 26 - 50%.   Goal Status:  Showing progress met with [facility]. Has not yet made a decision  As of today's date 5/25/2016 goal is met at 0 - 25%.   Goal Status:  Ongoing agency had to reschedule appt.  As of today's date 5/10/2016 goal is met at 0 - 25%.   Goal Status:  Active referral to [facility]        Equal Access to Services     ANNA CANO AH: Madhuri Darby, fide thompson, ofelia ojeda, isabell echevarria. [facility] [phone].    ATENCIÓN: Si habla español, tiene a faustin disposición servicios gratuitos de asistencia lingüística. Llame al [phone].    We comply with applicable federal civil rights laws and [place] laws. We do not discriminate on the basis of race, color, national  origin, age, disability, sex, sexual orientation, or gender identity.            Thank you!     Thank you for choosing [facility]  for your care. Our goal is always to provide you with excellent care. Hearing back from our patients is one way we can continue to improve our services. Please take a few minutes to complete the written survey that you may receive in the mail after your visit with us. Thank you!             Your Updated Medication List - Protect others around you: Learn how to safely use, store and throw away your medicines at www.disposemymeds.org.          This list is accurate as of 5/17/18  2:17 PM.  Always use your most recent med list.                   Brand Name Dispense Instructions for use Diagnosis    acetaminophen 500 MG Caps     60 capsule    Take 500 mg by mouth every 4 hours as needed        albuterol 108 (90 Base) MCG/ACT Inhaler    PROAIR HFA/PROVENTIL HFA/VENTOLIN HFA     Inhale 2 puffs into the lungs every 4 hours as needed        ALPRAZolam 1 MG 24 hr tablet    XANAX XR     Take 1 mg by mouth every morning        atorvastatin 40 MG tablet    LIPITOR    90 tablet    TAKE 1 TABLET (40 MG) BY MOUTH DAILY    Mixed hyperlipidemia       celecoxib 200 MG capsule    celeBREX    180 capsule    TAKE 1 CAPSULE BY MOUTH TWICE DAILY AS NEEDED FORMODERATE PAIN    Chronic midline low back pain without sciatica       cetirizine 10 MG tablet    zyrTEC    30 tablet    Take 1 tablet (10 mg) by mouth At Bedtime    Cough       cyanocobalamin 1000 MCG/ML injection    VITAMIN B12    10 mL    Inject 1 mL (1,000 mcg) into the muscle every 30 days    B12 deficiency       EPINEPHrine 0.3 MG/0.3ML injection 2-pack    EPIPEN/ADRENACLICK/or ANY BX GENERIC EQUIV    0.6 mL    Inject 0.3 mLs (0.3 mg) into the muscle once as needed for anaphylaxis    Food allergy       ginger root 550 MG Caps capsule      Take 550 mg by mouth Up to three times daily        hydrOXYzine 50 MG tablet    ATARAX      Take 50 mg by mouth daily        INFLIXIMAB IV           lamoTRIgine 25 MG tablet    LaMICtal     Take 200 mg by mouth daily        levothyroxine 75 MCG tablet    SYNTHROID/LEVOTHROID    90 tablet    Take 1 tablet (75 mcg) by mouth every morning    Acquired hypothyroidism       loperamide 2 MG capsule    IMODIUM     Take 2 mg by mouth 4 times daily as needed for diarrhea        metoprolol succinate 200 MG 24 hr tablet    TOPROL-XL    90 tablet    Take 1 tablet (200 mg) by mouth daily    Essential hypertension with goal blood pressure less than 140/90       omega-3 acid ethyl esters 1 g capsule    LOVAZA    360 capsule    Take 2 capsules (2 g) by mouth 2 times daily    Hypertriglyceridemia       omeprazole 20 MG tablet      Take 20 mg by mouth daily        order for DME     12 each    Equipment being ordered: 1 ml tuberculin syringes to be used for Vitamin B12 injections.    Vitamin B12 deficiency (non anaemic)       order for DME     1 Units    SI-loc belt    SI (sacroiliac) joint dysfunction       order for DME      Respironics REMSTAR 60 Series Auto CPAP 10-12 cm H2O, Wisp nasal mask w/a large cushion and a chinstrap        oxyCODONE IR 5 MG tablet    ROXICODONE    35 tablet    Take 1-2 tablets (5-10 mg) by mouth every 6 hours as needed for pain (maximum 4 tablet(s) per day)    Facet arthropathy       pregabalin 75 MG capsule    LYRICA    60 capsule    Take 1 capsule (75 mg) by mouth 2 times daily    Chronic midline low back pain without sciatica       ramipril 5 MG capsule    ALTACE    90 capsule    TAKE 1 CAPSULE BY MOUTH DAILY    Hypertension goal BP (blood pressure) < 140/90       RISPERDAL PO      Take 1 mg by mouth        tiZANidine 4 MG tablet    ZANAFLEX    90 tablet    Take 1 tablet (4 mg) by mouth 3 times daily as needed for muscle spasms    Dorsalgia       vitamin D3 67201 UNITS capsule    CHOLECALCIFEROL    24 capsule    Take one capsule every two weeks.    Vitamin D deficiency

## 2018-05-17 NOTE — PROGRESS NOTES
SUBJECTIVE:   Joel Pineda is a 42 year old male who presents to clinic today for the following health issues:    ED/UC Followup:    Facility:  [facility] ER  Date of visit: 5/17/18  Reason for visit: Chest pain, unspecified  Current Status: sob,sweaty,dizzy     Additional: Need BP refills     Last night was watching TV and eating chicken fried rice from [employer] and felt sob, flushed and anxious.  Checked BP and BP was 180's/130.  Called 911 and seen in ED and told related to allergic reaction.      AS - getting infusions and thinks this might be causing HTN    Hypertension Follow-up      Outpatient blood pressures are being checked at home.  Results are as above.    Low Salt Diet: not monitoring salt      Problem list and histories reviewed & adjusted, as indicated.  Additional history: as documented    Patient Active Problem List   Diagnosis     Major depressive disorder, recurrent episode (H)     Intermittent asthma     Hyperlipidemia LDL goal <130     Hypertension goal BP (blood pressure) < 140/90     Chronic nonallergic rhinitis     History of diverticulitis of colon     Obesity (BMI 30-39.9)     Health Care Home     PE (pulmonary embolism)     Diverticulosis     GERD (gastroesophageal reflux disease)     Anxiety     LLOYD (obstructive sleep apnea)- mild (AHI 11)     Intracranial arachnoid cyst     Facet arthritis of cervical region (H)     Acquired hypothyroidism     Bipolar 2 disorder (H)     Allergic rhinitis, unspecified allergic rhinitis type     Chronic midline low back pain without sciatica     Irritable bowel syndrome with diarrhea     B12 deficiency     Impaired glucose tolerance     Essential hypertension with goal blood pressure less than 140/90     Psychophysiological insomnia     Chronic pain syndrome     Ankylosing spondylitis of sacral region (H)     Morbid obesity due to hypertriglyceridemia (H)     Fatty infiltration of liver     Past Surgical History:   Procedure Laterality Date     BACK  "SURGERY  10/07    lumbar discectomy L5-S1     COLONOSCOPY      Note: colonoscopy scheduled with [facility] on Friday, 9/4/15     COSMETIC SURGERY  2012    Nose Exterior - functional     GI SURGERY  August 2013    Sigmoidectomy     HERNIA REPAIR, UMBILICAL  8/23/11    small, Dr. Evan Beavers     INCISION AND DRAINAGE, ABSCESS, COMPLEX  8/23/11    umbilical, Dr. Evan Beavers     LAPAROSCOPIC ASSISTED COLECTOMY LEFT (DESCENDING)  8/15/2013    Procedure: LAPAROSCOPIC ASSISTED COLECTOMY LEFT (DESCENDING);  Laparoscopic Hand Assisted Sigmoid Resection, Mobilization of Splenic Fissure, coloproctoscopy, *Latex Free Room* Anesthesia General with Pain block  ;  Surgeon: Aurora Justice MD;  Location: [facility] OR     NERVE SURGERY  8/18/11    RF ablation @ L3-S1 @ [facility]     RECONSTRUCT NOSE AND SEPTUM (FUNCTIONAL)  10/14/2011    Procedure:RECONSTRUCT NOSE AND SEPTUM (FUNCTIONAL); Functional Septorhinoplasty, Turbinate Reduction, ; Surgeon:CEDRIC CUEVAS; Location:[facility] OR     SINUS SURGERY  10/1/01    ethmoidectomy chronic sinusitis       Social History   Substance Use Topics     Smoking status: Never Smoker     Smokeless tobacco: Never Used     Alcohol use No     Family History   Problem Relation Age of Onset     Musculoskeletal Disorder Mother      back     Anxiety Disorder Mother      Colon Polyps Mother      Ulcerative Colitis Mother      and ischemic small intestine, surgery     Hypertension Mother      Breast Cancer Mother      OSTEOPOROSIS Mother      DIABETES Mother      Type 2, Diagnosed in 2014     Depression Mother      Takes Cymbalta to help with chronic pain + depx     Thyroid Disease Mother      Hypothyroidism     Obesity Mother      Under much better control latter half of 2015     Musculoskeletal Disorder Father      back     Substance Abuse Father      Hypertension Father      Hyperlipidemia Father      Depression Father      Off meds for many years. Seems \"ok\"     HEART DISEASE Maternal Grandmother      HEART DISEASE " "Maternal Grandfather      Psychotic Disorder Paternal Grandfather      Suicide Paternal Grandfather      Depression Paternal Grandfather      Pediatrician. Committed suicide by pistol in 1990.     Musculoskeletal Disorder Brother      back     Depression Brother      Expressed as anger and moodiness     Substance Abuse Sister      Depression Sister      Mental Health Therapist, yet no anti-depressants?     Anxiety Disorder Sister      Mental Health Therapist, yet no anti-anxiety meds?     Other Cancer Other      Bladder Cancer - Fatal     Substance Abuse Brother      Colon Cancer No family hx of      Crohn Disease No family hx of      Anesthesia Reaction No family hx of      CANCER No family hx of      No family history of skin cancer           Reviewed and updated as needed this visit by clinical staff  Tobacco  Allergies  Meds  Problems  Med Hx  Surg Hx  Fam Hx  Soc Hx        Reviewed and updated as needed this visit by Provider  Allergies  Meds  Problems         ROS:  Constitutional, HEENT, cardiovascular, pulmonary, GI, , musculoskeletal, neuro, skin, endocrine and psych systems are negative, except as otherwise noted.    OBJECTIVE:     /81 (BP Location: Left arm, Patient Position: Chair, Cuff Size: Adult Large)  Pulse 128  Temp 99.2  F (37.3  C) (Oral)  Resp 20  Ht 6' 5\" (1.956 m)  Wt (!) 333 lb 3.2 oz (151.1 kg)  SpO2 97%  BMI 39.51 kg/m2  Body mass index is 39.51 kg/(m^2).  GENERAL: healthy, alert, no distress and obese  EYES: Eyes grossly normal to inspection, PERRL and conjunctivae and sclerae normal  HENT: ear canals and TM's normal, nose and mouth without ulcers or lesions  NECK: no adenopathy, no asymmetry, masses, or scars and thyroid normal to palpation  RESP: lungs clear to auscultation - no rales, rhonchi or wheezes  CV: regular rate and rhythm, normal S1 S2, no S3 or S4, no murmur, click or rub, no peripheral edema and peripheral pulses strong  ABDOMEN: soft, nontender, no " hepatosplenomegaly, no masses and bowel sounds normal  MS: no gross musculoskeletal defects noted, no edema  NEURO: Normal strength and tone, mentation intact and speech normal  PSYCH: mentation appears normal and anxious    Diagnostic Test Results:  Care Everywhere images and labs reviewed    ASSESSMENT/PLAN:     1. Allergic reaction, initial encounter  Check liver function  - Hepatic panel (Albumin, ALT, AST, Bili, Alk Phos, TP)    2. Anxiety  Continue per psychiatry.    3. Morbid obesity due to hypertriglyceridemia (H)  Low carb eating    4. Fatty infiltration of liver  Check liver function    5. Essential hypertension with goal blood pressure less than 140/90  Refill as no longer seeing psychiatrist that ordered RX.  Discussed with Dr Baxter about infusions.  - metoprolol succinate (TOPROL-XL) 200 MG 24 hr tablet; Take 1 tablet (200 mg) by mouth daily  Dispense: 90 tablet; Refill: 1    The uses and side effects, including black box warnings as appropriate, were discussed in detail.  All patient questions were answered.  The patient was instructed to call immediately if any side effects developed.     FUTURE APPOINTMENTS:       - Follow-up visit in 3 days if not improved.    Cindy Webber MD  [facility]

## 2018-05-17 NOTE — PATIENT INSTRUCTIONS
At [facility], we strive to deliver an exceptional experience to you, every time we see you.  If you receive a survey in the mail, please send us back your thoughts. We really do value your feedback.    Based on your medical history, these are the current health maintenance/preventive care services that you are due for (some may have been done at this visit.)  Health Maintenance Due   Topic Date Due     HIV SCREEN (SYSTEM ASSIGNED)  07/01/1991     URINE DRUG SCREEN Q1 YR  10/25/2016     EYE EXAM Q1 YEAR  04/04/2018     PHQ-9 Q3 MONTHS  04/24/2018       Suggested websites for health information:  Www.[facility] Center[facility].org : Up to date and easily searchable information on multiple topics.  Www.Slice.gov : medication info, interactive tutorials, watch real surgeries online  Www.familydoctor.org : good info from the Academy of Family Physicians  Www.cdc.gov : public health info, travel advisories, epidemics (H1N1)  Www.aap.org : children's health info, normal development, vaccinations  Www.health.[facility].[place].us : [place] dept of health, public health issues in [place], N1N1    Your care team:                            Family Medicine Internal Medicine   MD Houston Frances MD Shantel Branch-Fleming, MD Katya Georgiev PA-C Megan Hill, APRMAHENDRA Cavazos MD Pediatrics   SHERWIN Hastings, MD Radha Camarena APRN CNP   MD Humaira Devlin MD Deborah Mielke, MD Kim Thein, APRN [facility]      Clinic hours: Monday - Thursday 7 am-7 pm; Fridays 7 am-5 pm.   Urgent care: Monday - Friday 11 am-9 pm; Saturday and Sunday 9 am-5 pm.  Pharmacy : Monday -Thursday 8 am-8 pm; Friday 8 am-6 pm; Saturday and Sunday 9 am-5 pm.     Clinic: [phone]   Pharmacy: [phone]

## 2018-05-18 ASSESSMENT — PATIENT HEALTH QUESTIONNAIRE - PHQ9: SUM OF ALL RESPONSES TO PHQ QUESTIONS 1-9: 20

## 2018-05-18 NOTE — PROGRESS NOTES
Mr. Pineda,    Your liver function tests are normal.    Please contact the clinic if you have additional questions.  Thank you.    Sincerely,    Cindy Webber

## 2018-05-31 ENCOUNTER — THERAPY VISIT (OUTPATIENT)
Dept: PHYSICAL THERAPY | Facility: CLINIC | Age: 43
End: 2018-05-31
Payer: COMMERCIAL

## 2018-05-31 ENCOUNTER — OFFICE VISIT (OUTPATIENT)
Dept: RHEUMATOLOGY | Facility: CLINIC | Age: 43
End: 2018-05-31
Payer: COMMERCIAL

## 2018-05-31 VITALS
OXYGEN SATURATION: 97 % | HEIGHT: 77 IN | BODY MASS INDEX: 37.19 KG/M2 | SYSTOLIC BLOOD PRESSURE: 131 MMHG | WEIGHT: 315 LBS | DIASTOLIC BLOOD PRESSURE: 83 MMHG | HEART RATE: 86 BPM | RESPIRATION RATE: 16 BRPM

## 2018-05-31 DIAGNOSIS — M45.6 ANKYLOSING SPONDYLITIS LUMBAR REGION (H): Primary | ICD-10-CM

## 2018-05-31 DIAGNOSIS — M53.3 SACROILIAC JOINT DYSFUNCTION: ICD-10-CM

## 2018-05-31 DIAGNOSIS — M45.8 ANKYLOSING SPONDYLITIS OF SACRAL REGION (H): Primary | ICD-10-CM

## 2018-05-31 DIAGNOSIS — Z79.899 HIGH RISK MEDICATION USE: ICD-10-CM

## 2018-05-31 LAB
BASOPHILS # BLD AUTO: 0.1 10E9/L (ref 0–0.2)
BASOPHILS NFR BLD AUTO: 1 %
DIFFERENTIAL METHOD BLD: ABNORMAL
EOSINOPHIL # BLD AUTO: 0.3 10E9/L (ref 0–0.7)
EOSINOPHIL NFR BLD AUTO: 2.8 %
ERYTHROCYTE [DISTWIDTH] IN BLOOD BY AUTOMATED COUNT: 13.9 % (ref 10–15)
HCT VFR BLD AUTO: 44 % (ref 40–53)
HGB BLD-MCNC: 15 G/DL (ref 13.3–17.7)
LYMPHOCYTES # BLD AUTO: 3.8 10E9/L (ref 0.8–5.3)
LYMPHOCYTES NFR BLD AUTO: 35.9 %
MCH RBC QN AUTO: 30.7 PG (ref 26.5–33)
MCHC RBC AUTO-ENTMCNC: 34.1 G/DL (ref 31.5–36.5)
MCV RBC AUTO: 90 FL (ref 78–100)
MONOCYTES # BLD AUTO: 1.4 10E9/L (ref 0–1.3)
MONOCYTES NFR BLD AUTO: 13.2 %
NEUTROPHILS # BLD AUTO: 5 10E9/L (ref 1.6–8.3)
NEUTROPHILS NFR BLD AUTO: 47.1 %
PLATELET # BLD AUTO: 259 10E9/L (ref 150–450)
RBC # BLD AUTO: 4.89 10E12/L (ref 4.4–5.9)
WBC # BLD AUTO: 10.6 10E9/L (ref 4–11)

## 2018-05-31 PROCEDURE — 97140 MANUAL THERAPY 1/> REGIONS: CPT | Mod: GP | Performed by: PHYSICAL THERAPIST

## 2018-05-31 PROCEDURE — 97110 THERAPEUTIC EXERCISES: CPT | Mod: GP | Performed by: PHYSICAL THERAPIST

## 2018-05-31 PROCEDURE — 36415 COLL VENOUS BLD VENIPUNCTURE: CPT | Performed by: INTERNAL MEDICINE

## 2018-05-31 PROCEDURE — 99213 OFFICE O/P EST LOW 20 MIN: CPT | Performed by: INTERNAL MEDICINE

## 2018-05-31 PROCEDURE — 97161 PT EVAL LOW COMPLEX 20 MIN: CPT | Mod: GP | Performed by: PHYSICAL THERAPIST

## 2018-05-31 PROCEDURE — 85025 COMPLETE CBC W/AUTO DIFF WBC: CPT | Performed by: INTERNAL MEDICINE

## 2018-05-31 RX ORDER — ALBUTEROL SULFATE 0.83 MG/ML
2.5 SOLUTION RESPIRATORY (INHALATION)
COMMUNITY
Start: 2018-05-29 | End: 2018-11-06

## 2018-05-31 RX ORDER — MONTELUKAST SODIUM 10 MG/1
10 TABLET ORAL DAILY
COMMUNITY
Start: 2018-05-29 | End: 2019-06-04

## 2018-05-31 RX ORDER — ASENAPINE 10 MG/1
10 TABLET SUBLINGUAL
COMMUNITY
Start: 2018-05-16 | End: 2018-06-26

## 2018-05-31 NOTE — PROGRESS NOTES
[facility] Initial Evaluation  Subjective:  Patient is a 42 year old male presenting with rehab back hpi. The history is provided by the patient.   Joel Pineda is a 42 year old male with a sacroiliac condition.  Condition occurred with:  Degenerative joint disease.  Condition occurred: at home.  This is a chronic condition  Pt has been referred to PT for a chronic pain of his Rt SI, LB and gluts.  The pain can vary.  He is a pt at [facility] and is having regular injections. He recently stopped chiro care.  .    Patient reports pain:  SI joint right, lumbar spine right and lower lumbar spine.  Radiates to:  Gluteals left and gluteals right.  Pain is described as burning and shooting (nerve pain ) and is intermittent and reported as 7/10.  Associated symptoms:  Loss of motion/stiffness and loss of strength. Pain is the same all the time.  Symptoms are exacerbated by sitting, walking and standing   Since onset symptoms are gradually worsening.  Special tests:  X-ray and MRI.  Previous treatment includes chiropractic and other.    General health as reported by patient is fair.  Pertinent medical history includes:  Depression and mental illness.    Other surgeries include:  Orthopedic surgery (2007 microdiscectomy, LS).  Current medications:  Pain medication.    Patient is currently not working due to present treatment problem.      Barriers include:  None as reported by the patient.    Red flags:  None as reported by the patient.                        Objective:  System         Lumbar/SI Evaluation  ROM:    AROM Lumbar:   Flexion:         Min loss   Ext:                    Mjrloss    Side Bend:        Left:   .    Right:   Rotation:           Left:     Right:   Side Glide:        Left:     Right:           Lumbar Myotomes:  normal            Lumbar DTR's:  not assessed      Cord Signs:  not assessed    Lumbar Dermtomes:  not assessed                Neural Tension/Mobility:  Lumbar:  Normal         Lumbar Palpation:      Tenderness present at Right: Quadratus Lumborum; PSIS; ASIS; Iliac Crest; Gluteus Medius and Hip Flexors        SI joint/Sacrum:          Left negative at:    Ilium Ventromedial  Right positive at:    Ilium Ventromedial    Sacral conclusion right:  Sacral torsion, upslip, posterior inominate, locked, elevated pubic sym and outflare                                             General     ROS    Assessment/Plan:    Patient is a 42 year old male with lumbar, sacral and pelvic complaints.    Patient has the following significant findings with corresponding treatment plan.                Diagnosis 1:  LBP  Pain -  manual therapy, self management and home program  Decreased ROM/flexibility - manual therapy, therapeutic exercise and home program  Decreased joint mobility - manual therapy, therapeutic exercise and home program  Decreased proprioception - neuro re-education and therapeutic activities  Impaired muscle performance - neuro re-education  Decreased function - therapeutic activities  Diagnosis 2:  SI dysf Rt    Pain -  manual therapy, splint/taping/bracing/orthotics, education and home program  Decreased ROM/flexibility - manual therapy and therapeutic exercise  Decreased joint mobility - manual therapy and therapeutic exercise  Impaired muscle performance - neuro re-education  Decreased function - therapeutic activities  Instability -  Therapeutic Activity  Therapeutic Exercise    Therapy Evaluation Codes:   1) History comprised of:   Personal factors that impact the plan of care:      Coping style, Overall behavior pattern and Past/current experiences.    Comorbidity factors that impact the plan of care are:      Bowel/bladder changes, Depression, Mental illness, Osteoarthritis, Overweight and Sleep disorder/apnea.     Medications impacting care: Anti-depressant, Pain and Sleep.  2) Examination of Body Systems comprised of:   Body structures and functions that impact the plan of  care:      Lumbar spine and Sacral illiac joint.   Activity limitations that impact the plan of care are:      Bending, Lifting, Squatting/kneeling, Standing and Walking.  3) Clinical presentation characteristics are:   Stable/Uncomplicated.  4) Decision-Making    Low complexity using standardized patient assessment instrument and/or measureable assessment of functional outcome.  Cumulative Therapy Evaluation is: Low complexity.    Previous and current functional limitations:  (See Goal Flow Sheet for this information)    Short term and Long term goals: (See Goal Flow Sheet for this information)     Communication ability:  Patient appears to be able to clearly communicate and understand verbal and written communication and follow directions correctly.  Treatment Explanation - The following has been discussed with the patient:   RX ordered/plan of care  Anticipated outcomes  Possible risks and side effects  This patient would benefit from PT intervention to resume normal activities.   Rehab potential is good.    Frequency:  1 X week, once daily  Duration:  for 8 weeks  Discharge Plan:  Achieve all LTG.  Independent in home treatment program.  Reach maximal therapeutic benefit.    Please refer to the daily flowsheet for treatment today, total treatment time and time spent performing 1:1 timed codes.

## 2018-05-31 NOTE — LETTER
MAD[facility] JACOBS PT  [address]  [address]  [place] [address]  [phone]    2018    Re: Joel Pineda   :   1975  MRN:  [id number]   REFERRING PHYSICIAN:   MD MADINA Ballard PT  Date of Initial Evaluation:  2018  Visits:  Rxs Used: 1  Reason for Referral:     Ankylosing spondylitis lumbar region (H)  Sacroiliac joint dysfunction    EVALUATION SUMMARY    [facility] Initial Evaluation  Subjective:  Patient is a 42 year old male presenting with rehab back hpi. The history is provided by the patient.   Joel Pineda is a 42 year old male with a sacroiliac condition.  Condition occurred with:  Degenerative joint disease.  Condition occurred: at home.  This is a chronic condition  Pt has been referred to PT for a chronic pain of his Rt SI, LB and gluts.  The pain can vary.  He is a pt at [facility] and is having regular injections. He recently stopped chiro care.  Patient reports pain:  SI joint right, lumbar spine right and lower lumbar spine.  Radiates to:  Gluteals left and gluteals right.  Pain is described as burning and shooting (nerve pain ) and is intermittent and reported as 7/10.  Associated symptoms:  Loss of motion/stiffness and loss of strength. Pain is the same all the time.  Symptoms are exacerbated by sitting, walking and standing   Since onset symptoms are gradually worsening.  Special tests:  X-ray and MRI.  Previous treatment includes chiropractic and other. General health as reported by patient is fair.  Pertinent medical history includes:  Depression and mental illness.  Other surgeries include:  Orthopedic surgery (2007 microdiscectomy, LS).  Current medications:  Pain medication.    Patient is currently not working due to present treatment problem.      Barriers include:  None as reported by the patient.  Red flags:  None as reported by the patient.    Objective:  Lumbar/SI Evaluation  ROM:    AROM Lumbar:   Flexion:          Min loss   Ext:                    Mjrloss    Side Bend:        Left:   .    Right:   Rotation:           Left:     Right:   Side Glide:        Left:     Right:           Re: Joel Pineda   :   1975    Lumbar Myotomes:  normal  Lumbar DTR's:  not assessed  Cord Signs:  not assessed  Lumbar Dermtomes:  not assessed  Neural Tension/Mobility:  Lumbar:  Normal      Lumbar Palpation:    Tenderness present at Right: Quadratus Lumborum; PSIS; ASIS; Iliac Crest; Gluteus Medius and Hip Flexors    SI joint/Sacrum:      Left negative at:    Ilium Ventromedial  Right positive at:    Ilium Ventromedial    Sacral conclusion right:  Sacral torsion, upslip, posterior inominate, locked, elevated pubic sym and outflare          Assessment/Plan:    Patient is a 42 year old male with lumbar, sacral and pelvic complaints.    Patient has the following significant findings with corresponding treatment plan.                Diagnosis 1:  LBP  Pain -  manual therapy, self management and home program  Decreased ROM/flexibility - manual therapy, therapeutic exercise and home program  Decreased joint mobility - manual therapy, therapeutic exercise and home program  Decreased proprioception - neuro re-education and therapeutic activities  Impaired muscle performance - neuro re-education  Decreased function - therapeutic activities  Diagnosis 2:  SI dysf Rt    Pain -  manual therapy, splint/taping/bracing/orthotics, education and home program  Decreased ROM/flexibility - manual therapy and therapeutic exercise  Decreased joint mobility - manual therapy and therapeutic exercise  Impaired muscle performance - neuro re-education  Decreased function - therapeutic activities  Instability -  Therapeutic Activity  Therapeutic Exercise    Therapy Evaluation Codes:   1) History comprised of:   Personal factors that impact the plan of care:      Coping style, Overall behavior pattern and Past/current experiences.    Comorbidity factors that impact  the plan of care are:      Bowel/bladder changes, Depression, Mental illness, Osteoarthritis, Overweight and Sleep disorder/apnea.     Medications impacting care: Anti-depressant, Pain and Sleep.  2) Examination of Body Systems comprised of:   Body structures and functions that impact the plan of care:      Lumbar spine and Sacral illiac joint.   Activity limitations that impact the plan of care are:      Bending, Lifting, Squatting/kneeling, Standing and Walking.  3) Clinical presentation characteristics are:   Stable/Uncomplicated.  Re: Joel Pineda   :   1975    4) Decision-Making    Low complexity using standardized patient assessment instrument and/or measureable assessment of functional outcome.  Cumulative Therapy Evaluation is: Low complexity.    Previous and current functional limitations:  (See Goal Flow Sheet for this information)    Short term and Long term goals: (See Goal Flow Sheet for this information)     Communication ability:  Patient appears to be able to clearly communicate and understand verbal and written communication and follow directions correctly.  Treatment Explanation - The following has been discussed with the patient:   RX ordered/plan of care  Anticipated outcomes  Possible risks and side effects  This patient would benefit from PT intervention to resume normal activities.   Rehab potential is good.    Frequency:  1 X week, once daily  Duration:  for 8 weeks  Discharge Plan:  Achieve all LTG.  Independent in home treatment program.  Reach maximal therapeutic benefit.    Please refer to the daily flowsheet for treatment today, total treatment time and time spent performing 1:1 timed codes.         Thank you for your referral.    INQUIRIES  Therapist: Orestes Shelley PT   [facility]  [address] [address]  [place] [address]  Phone: [phone]  Fax: [phone]

## 2018-05-31 NOTE — PROGRESS NOTES
"Rheumatology Clinic Visit      Joel Pineda MRN# [id number]   YOB: 1975 Age: 42 year old      Date of visit: 5/31/18   PCP: Dr. Ksenia Lyles    Chief Complaint   Patient presents with:  Arthritis: patient is still having some pain but no stifness. Would like to get a dexa scan also.      Assessment and Plan     1.  Ankylosing spondylitis: Many years of inflammatory back pain but no clear sacroiliitis seen on x-rays or MRIs; then had a lumbar spine x-ray on 2/23/2018 at [facility] showing \"Moderate L5-S1 and mild L4-5 degenerative disc disease and degenerative facet arthropathy. No evidence for fracture, subluxation, or spondylolisthesis. Chronic bridging enthesophyte across the lower right SI joint with irregularity of the joint space suspicious for sequelae of chronic inflammatory sacroiliitis. Correlate clinically.\"  This is complicated by a history of back surgery and degenerative changes.  HLA-B27 positive per record review but the actual lab report has not been seen.  He has a personal history of diverticulitis requiring sigmoidoscopy.  Reportedly his mother has ulcerative colitis. Based on his symptoms, the x-ray results, and HLA-B27 positive, it is most likely ankylosing spondylitis and Remicade was started with improvement of lower back pain and resolution of lower back stiffness.  He requires premedications with Remicade.  Given improvement, will continue Remicade.  Note that he does have degenerative changes of the spine as well and still follows with the pain clinic; he is going to physical therapy for lower back pain today.  - Continue remicade 5mg/kg IV every 8 weeks  - Lab  today: CBC    2. Discussed osteoporosis screening: He reportedly had a bone scan at his previous pain clinic; he is not sure if it was a bone scan or bone density scan.  He said that when he contacted that clinic they could not find results of it.  We reviewed the recommendations for osteoporosis screening.  Not " "ordering a bone density scan today.    Mr. Pineda verbalized agreement with and understanding of the rational for the diagnosis and treatment plan.  All questions were answered to best of my ability and the patient's satisfaction. Mr. Pineda was advised to contact the clinic with any questions that may arise after the clinic visit.      Thank you for involving me in the care of the patient    Return to clinic: 3 months      HPI   Joel Pineda is a 42 year old male with a past medical history significant for hypertension, hyperlipidemia, asthma, history of pulmonary embolism, history of diverticulitis requiring sigmoidectomy, GERD, obstructive sleep apnea, bipolar disorder, hypothyroidism, B12 deficiency, CKD? (reportedly issue with contrast) and chronic pain syndrome who presents for follow-up of ankylosing spondylitis.    10/6/2015 rheumatology clinic note by Dr. Ross Mcfarlane documents facet arthritis, foraminal stenosis, and disc disease as a cause for his lower back pain.  Planning to see neurosurgery for an arachnoid cyst.    Was evaluated at the [facility] on 2/23/2018 by Dr. Geo Greene who documents the patient had a back injury when he slipped on ice.  Taking a cocktail of pain medications including Celebrex, oxycodone, tizanidine, Tylenol.  Previous surgery on L5 and S1.  Back pain and referred right thigh pain.  X-ray lumbar spine showed moderate L5-S1 and mild L4-5 degenerative disc disease and degenerative facet arthropathy.  \"Chronic bridging enthesophyte across the lower right SI joint with irregularity of the joint space suspicious for sequelae of chronic inflammatory sacroiliitis.\"     Today, Mr. Pineda reports that the pain intensity in his lower back is much better since starting Remicade.  He also has no morning stiffness and no gelling phenomenon.  He says that typically he would be sitting in a chair and had some difficulty and stiffness when he gets up but he will he can hop " right out of the chair and get going immediately.  Has some abdominal pain after Remicade infusions but this has resolved since premedicated with Benadryl, Tylenol, and methylprednisolone.      Denies fevers, chills, nausea, vomiting, constipation, diarrhea. No abdominal pain. No chest pain/pressure, palpitations, or shortness of breath. No LE swelling. No neck pain. No oral or nasal sores.  No rash. No sicca symptoms. No photosensitivity or photophobia. No eye pain or redness. No history of inflammatory eye disease.  No history of Raynaud's Phenomenon.  No seizure history.  No known renal disorder.      Hx of sigmoidectomy for diverticulitis    Pulmonary emboli a couple years ago; tx'd with warfarin for 6 mo.    Mother: ulcerative colitis    Tobacco: None  EtOH: None  Drugs: None    ROS   GEN: No fevers, chills, night sweats, or weight change  SKIN: No itching, rashes, sores  HEENT: No oral or nasal ulcers.  CV: No chest pain, pressure, palpitations, or dyspnea on exertion.  PULM: No SOB, wheeze, cough.  GI: No nausea, vomiting, constipation, diarrhea. No blood in stool. No abdominal pain.  : No blood in urine.  MSK: See HPI.  NEURO: No numbness or tingling  EXT: No LE swelling  PSYCH: See HPI    Active Problem List     Patient Active Problem List   Diagnosis     Major depressive disorder, recurrent episode (H)     Intermittent asthma     Hyperlipidemia LDL goal <130     Hypertension goal BP (blood pressure) < 140/90     Chronic nonallergic rhinitis     History of diverticulitis of colon     Obesity (BMI 30-39.9)     Health Care Home     PE (pulmonary embolism)     Diverticulosis     GERD (gastroesophageal reflux disease)     Anxiety     LLOYD (obstructive sleep apnea)- mild (AHI 11)     Intracranial arachnoid cyst     Facet arthritis of cervical region (H)     Acquired hypothyroidism     Bipolar 2 disorder (H)     Allergic rhinitis, unspecified allergic rhinitis type     Chronic midline low back pain without  sciatica     Irritable bowel syndrome with diarrhea     B12 deficiency     Impaired glucose tolerance     Essential hypertension with goal blood pressure less than 140/90     Psychophysiological insomnia     Chronic pain syndrome     Ankylosing spondylitis of sacral region (H)     Morbid obesity due to hypertriglyceridemia (H)     Fatty infiltration of liver     Past Medical History     Past Medical History:   Diagnosis Date     Acne      Chest pain     Chest pain, regulated w/BP meds. Clear arteries.     Skin exam, screening for cancer 12/3/2013     Past Surgical History     Past Surgical History:   Procedure Laterality Date     BACK SURGERY  10/07    lumbar discectomy L5-S1     COLONOSCOPY      Note: colonoscopy scheduled with [facility] on Friday, 9/4/15     COSMETIC SURGERY  2012    Nose Exterior - functional     GI SURGERY  August 2013    Sigmoidectomy     HERNIA REPAIR, UMBILICAL  8/23/11    small, Dr. Evan Beavers     INCISION AND DRAINAGE, ABSCESS, COMPLEX  8/23/11    umbilical, Dr. Evan Beavers     LAPAROSCOPIC ASSISTED COLECTOMY LEFT (DESCENDING)  8/15/2013    Procedure: LAPAROSCOPIC ASSISTED COLECTOMY LEFT (DESCENDING);  Laparoscopic Hand Assisted Sigmoid Resection, Mobilization of Splenic Fissure, coloproctoscopy, *Latex Free Room* Anesthesia General with Pain block  ;  Surgeon: Aurora Justice MD;  Location: [facility] OR     NERVE SURGERY  8/18/11    RF ablation @ L3-S1 @ [facility]     RECONSTRUCT NOSE AND SEPTUM (FUNCTIONAL)  10/14/2011    Procedure:RECONSTRUCT NOSE AND SEPTUM (FUNCTIONAL); Functional Septorhinoplasty, Turbinate Reduction, ; Surgeon:CEDRIC CUEVAS; Location:[facility] OR     SINUS SURGERY  10/1/01    ethmoidectomy chronic sinusitis     Allergy     Allergies   Allergen Reactions     Banana Shortness Of Breath     Pt reports organic Banana is okay.      Nitroglycerin Palpitations     Penicillins Anaphylaxis     Provigil [Modafinil] Shortness Of Breath     headache     Ciprofloxacin Palpitations, Other (See  Comments) and Rash     dizziness (versus metronidazole taken at same time)     Gadolinium Hives and Itching     Patient was premedicated for the contrast allergy. He did still have a reaction a few hours after injection. Hives and itching. Dr. Gomez told tech to inform pt he should only have contrast again in the future when premedicated and at a hospital. Not at an outpatient facility.      Dulera Other (See Comments)     Hoarse voice     Dye [Contrast Dye] Other (See Comments) and Hives     Moderate flushing, CT contrast     Levaquin Other (See Comments)     tendonitis     Levofloxacin      Tendonitis  Tendonitis     Neurontin [Gabapentin] Hives     Moderate hives     Nortriptyline Hives     Varicella Virus Vaccine Live      Rash     Ciprofloxacin Palpitations     Flagyl [Metronidazole Hcl] Palpitations and Hives     Latex Rash     Metronidazole Palpitations, Other (See Comments) and Rash     dizziness (versus ciprofloxacin taken at same time)     No Clinical Screening - See Comments Rash     Nitrile gloves     Current Medication List     Current Outpatient Prescriptions   Medication Sig     acetaminophen 500 MG CAPS Take 500 mg by mouth every 4 hours as needed     albuterol (2.5 MG/3ML) 0.083% neb solution Inhale 2.5 mg into the lungs     albuterol (PROAIR HFA/PROVENTIL HFA/VENTOLIN HFA) 108 (90 BASE) MCG/ACT Inhaler Inhale 2 puffs into the lungs every 4 hours as needed     ALPRAZolam (XANAX XR) 1 MG 24 hr tablet Take 1 mg by mouth every morning     asenapine (SAPHRIS) 10 MG SUBL sublingual tablet Place 10 mg under the tongue     atorvastatin (LIPITOR) 40 MG tablet TAKE 1 TABLET (40 MG) BY MOUTH DAILY     celecoxib (CELEBREX) 200 MG capsule TAKE 1 CAPSULE BY MOUTH TWICE DAILY AS NEEDED FORMODERATE PAIN     cetirizine (ZYRTEC) 10 MG tablet Take 1 tablet (10 mg) by mouth At Bedtime     cyanocobalamin (VITAMIN B12) 1000 MCG/ML injection Inject 1 mL (1,000 mcg) into the muscle every 30 days     EPINEPHrine 0.3  MG/0.3ML injection Inject 0.3 mLs (0.3 mg) into the muscle once as needed for anaphylaxis     Ginger, Zingiber officinalis, (GINGER ROOT) 550 MG CAPS capsule Take 550 mg by mouth Up to three times daily     hydrOXYzine (ATARAX) 50 MG tablet Take 50 mg by mouth daily     INFLIXIMAB IV      lamoTRIgine (LAMICTAL) 25 MG tablet Take 200 mg by mouth daily      levothyroxine (SYNTHROID/LEVOTHROID) 75 MCG tablet Take 1 tablet (75 mcg) by mouth every morning     loperamide (IMODIUM) 2 MG capsule Take 2 mg by mouth 4 times daily as needed for diarrhea     metoprolol succinate (TOPROL-XL) 200 MG 24 hr tablet Take 1 tablet (200 mg) by mouth daily     montelukast (SINGULAIR) 10 MG tablet Take 10 mg by mouth     omega-3 acid ethyl esters (LOVAZA) 1 g capsule Take 2 capsules (2 g) by mouth 2 times daily     omeprazole 20 MG tablet Take 20 mg by mouth daily      pregabalin (LYRICA) 75 MG capsule Take 1 capsule (75 mg) by mouth 2 times daily     ramipril (ALTACE) 5 MG capsule TAKE 1 CAPSULE BY MOUTH DAILY     tiZANidine (ZANAFLEX) 4 MG tablet Take 1 tablet (4 mg) by mouth 3 times daily as needed for muscle spasms     vitamin D3 (CHOLECALCIFEROL) 48643 UNITS capsule Take one capsule every two weeks.     order for DME Respironics REMSTAR 60 Series Auto CPAP 10-12 cm H2O, Wisp nasal mask w/a large cushion and a chinstrap     order for DME SI-loc belt     order for DME Equipment being ordered: 1 ml tuberculin syringes to be used for Vitamin B12 injections.     oxyCODONE IR (ROXICODONE) 5 MG tablet Take 1-2 tablets (5-10 mg) by mouth every 6 hours as needed for pain (maximum 4 tablet(s) per day)     RisperiDONE (RISPERDAL PO) Take 1 mg by mouth     No current facility-administered medications for this visit.          Social History   See HPI    Family History     Family History   Problem Relation Age of Onset     Musculoskeletal Disorder Mother      back     Anxiety Disorder Mother      Colon Polyps Mother      Ulcerative Colitis Mother  "     and ischemic small intestine, surgery     Hypertension Mother      Breast Cancer Mother      OSTEOPOROSIS Mother      DIABETES Mother      Type 2, Diagnosed in 2014     Depression Mother      Takes Cymbalta to help with chronic pain + depx     Thyroid Disease Mother      Hypothyroidism     Obesity Mother      Under much better control latter half of 2015     Musculoskeletal Disorder Father      back     Substance Abuse Father      Hypertension Father      Hyperlipidemia Father      Depression Father      Off meds for many years. Seems \"ok\"     HEART DISEASE Maternal Grandmother      HEART DISEASE Maternal Grandfather      Psychotic Disorder Paternal Grandfather      Suicide Paternal Grandfather      Depression Paternal Grandfather      Pediatrician. Committed suicide by pistol in 1990.     Musculoskeletal Disorder Brother      back     Depression Brother      Expressed as anger and moodiness     Substance Abuse Sister      Depression Sister      Mental Health Therapist, yet no anti-depressants?     Anxiety Disorder Sister      Mental Health Therapist, yet no anti-anxiety meds?     Other Cancer Other      Bladder Cancer - Fatal     Substance Abuse Brother      Colon Cancer No family hx of      Crohn Disease No family hx of      Anesthesia Reaction No family hx of      CANCER No family hx of      No family history of skin cancer     Physical Exam     Temp Readings from Last 3 Encounters:   05/17/18 99.2  F (37.3  C) (Oral)   04/03/18 97.2  F (36.2  C) (Oral)   03/19/18 98.5  F (36.9  C) (Oral)     BP Readings from Last 5 Encounters:   05/31/18 131/83   05/17/18 133/81   04/03/18 125/83   03/19/18 117/78   03/12/18 116/74     Pulse Readings from Last 1 Encounters:   05/31/18 86     Resp Readings from Last 1 Encounters:   05/31/18 16     Estimated body mass index is 40.34 kg/(m^2) as calculated from the following:    Height as of this encounter: 1.956 m (6' 5\").    Weight as of this encounter: 154.3 kg (340 lb 3.2 " oz).    GEN: NAD; obese  HEENT: MMM. No oral lesions. Anicteric, noninjected sclera  CV: S1, S2. RRR. No m/r/g.  PULM: CTA bilaterally. No w/c.  MSK: MCPs, PIPs, DIPs, wrists, elbows, shoulders, knees, ankles, and MTPs without swelling or tenderness to palpation.  Hips nontender to direct palpation but range of motion testing bothered his back.  No dactylitis.  Achilles tendons nontender to palpation.  Lumbar spine diffusely tender to palpation.  Heel and occiput to wall without difficulty.    NEURO: UE and LE strengths 5/5 and equal bilaterally.   SKIN: No rash.  No nail pitting.  EXT: No LE edema  PSYCH: Alert. Appropriate.  Anxious    Labs / Imaging (select studies)   RF/CCP  Recent Labs   Lab Test  08/07/15   0846  09/03/14   1232   RHF  <20  <20     CBC  Recent Labs   Lab Test  01/18/18   0834  09/02/16   0914  02/08/16   1555   WBC  8.5  10.4  9.0   RBC  5.23  5.08  4.82   HGB  15.2  15.8  14.7   HCT  45.8  46.9  45.0   MCV  88  92  93   RDW  13.6  14.2  13.8   PLT  330  255  264   MCH  29.1  31.1  30.5   MCHC  33.2  33.7  32.7   NEUTROPHIL  48.4  65.9  54.2   LYMPH  38.8  25.0  31.4   MONOCYTE  9.5  6.6  11.0   EOSINOPHIL  2.4  1.8  2.7   BASOPHIL  0.9  0.7  0.7   ANEU  4.1  6.8  4.9   ALYM  3.3  2.6  2.8   KATIE  0.8  0.7  1.0   AEOS  0.2  0.2  0.2   ABAS  0.1  0.1  0.1     CMP  Recent Labs   Lab Test  05/17/18   1417  04/19/18   1116  03/12/18   1559  01/18/18   0834   01/03/17   0825   02/08/16   1555   01/25/16   1154   NA   --   138  139  138   < >  139   < >   --    --    --    POTASSIUM   --   3.9  4.1  3.9   < >  4.0   < >   --    --    --    CHLORIDE   --   104  106  102   < >  105   < >   --    --    --    CO2   --   23  27  26   < >  27   < >   --    --    --    ANIONGAP   --   11  6  10   < >  7   < >   --    --    --    GLC   --   135*  95  106*   < >  99   < >   --    --    --    BUN   --   12  17  18   < >  13   < >   --    --    --    CR   --   0.97  1.30*  1.10   < >  1.32*   < >   --    < >    --    GFRESTIMATED   --   84  60*  73   < >  59*   < >   --    < >   --    GFRESTBLACK   --   >90  72  88   < >  71   < >   --    < >   --    ALYCE   --   9.4  9.1  9.4   < >  9.2   < >   --    --    --    BILITOTAL  0.4   --    --    --    --   0.3   --   0.2   --   0.3   ALBUMIN  4.2   --   4.1   --    --   4.2   --    --    --   4.0   PROTTOTAL  7.7   --    --    --    --   7.2   --    --    --   7.4   ALKPHOS  106   --    --    --    --   66   --   100   --   73   AST  40   --    --    --    --   18   --   21   --   13   ALT  48   --    --    --    --   27   --   28   --   16    < > = values in this interval not displayed.     Calcium/VitaminD  Recent Labs   Lab Test  04/19/18   1116  03/12/18   1559  01/18/18   0834  05/03/17   1456   02/08/16   1555  01/25/16   1154   09/26/11   0822  02/17/11   0839  08/28/10   1152   ALYCE  9.4  9.1  9.4  9.5   < >   --    --    < >  9.4  9.5   --    D3VIT   --    --    --    --    --    --    --    --   38  38  37   VITDT   --    --    --   30   --   34  44   < >   --    --    --     < > = values in this interval not displayed.     ESR/CRP  Recent Labs   Lab Test  02/13/16   1510  01/04/16   1403  10/25/15   0852   08/07/15   0846   SED  6  5   --    --   5   CRP  3.4  <2.9  3.1   < >   --     < > = values in this interval not displayed.     Hepatitis B  Recent Labs   Lab Test  02/28/18   1157  01/06/15   1028   HBCAB  Nonreactive   --    HEPBANG  Nonreactive  Nonreactive     Hepatitis C  Recent Labs   Lab Test  02/28/18   1157  01/06/15   1028   HCVAB  Nonreactive  Nonreactive   Assay performance characteristics have not been established for newborns,   infants, and children       Tuberculosis Screening  Recent Labs   Lab Test  02/28/18   1157  01/06/15   1028   TBRSLT  Negative  Negative   TBAGN  0.00  0.00     Immunization History     Immunization History   Administered Date(s) Administered     Influenza (IIV3) PF 10/15/2008, 08/21/2012, 09/09/2013     Influenza Vaccine IM  3yrs+ 4 Valent IIV4 10/02/2015, 10/11/2016     Pneumo Conj 13-V (2010&after) 10/02/2015     Pneumococcal 23 valent 10/11/2016     TD (ADULT, 7+) 10/04/2002     TDAP Vaccine (Adacel) 07/16/2010          Chart documentation done in part with Dragon Voice recognition Software. Although reviewed after completion, some word and grammatical error may remain.    Oneil Baxter MD

## 2018-05-31 NOTE — MR AVS SNAPSHOT
After Visit Summary   5/31/2018    Joel Pineda    MRN: [id number]           Patient Information     Date Of Birth          1975        Visit Information        Provider Department      5/31/2018 11:30 AM Marshaol, Orestes LIN, PT [facility]        Today's Diagnoses     Ankylosing spondylitis lumbar region (H)    -  1    Sacroiliac joint dysfunction           Follow-ups after your visit        Your next 10 appointments already scheduled     Jun 07, 2018  2:00 PM CDT   [facility] Sp[facility] with Orestes Larsonol, PT   [facility] ([facility] [place])    [address] [facility]  [address]  [facility] [address]   [phone]            Jun 14, 2018  2:00 PM CDT   [facility] Spine with Orestes Larsonol, PT   [facility] ([facility] [place])    [address] [facility] [address]  [facility] [address]   [phone]            Jun 26, 2018  1:00 PM CDT   Level [address] ([facility])    [address] [place] [address] [phone]            Jun 26, 2018  2:30 PM CDT   SHORT with Radha Mcdonald [facility] ([facility])    [address] [place] [address] [address]  [id number]  [facility] [address] [phone]            Jun 28, 2018  2:00 PM CDT   [facility] Spine with Orestes Larsonol, PT   [facility] ([facility] [place])    [address] [facility]  [address]  [place] [address]   [phone]            Jul 05, 2018  2:00 PM CDT   [facility] Spine with Orestes Larsonol, PT   [facility] ([facility] [place])    [address] [facility]  [address]  [place] [address]   [phone]            Sep 19, 2018  2:40 PM CDT   Return Visit with Oneil Baxter MD   [facility]    [address] [facility]  [place] [address]   [phone]              Who to contact     If you have questions or need follow up information about today's clinic visit or your schedule please contact [facility]  directly at [phone].  Normal or non-critical lab and imaging results will be communicated to you by MyChart, letter or phone within 4 business days after the clinic has received the results. If you do not hear from us within 7 days, please contact the clinic through [employer] or phone. If you have a critical or abnormal lab result, we will notify you by phone as soon as possible.  Submit refill requests through [employer] or call your pharmacy and they will forward the refill request to us. Please allow 3 business days for your refill to be completed.          Additional Information About Your Visit        [employer] Information     [employer] gives you secure access to your electronic health record. If you see a primary care provider, you can also send messages to your care team and make appointments. If you have questions, please call your primary care clinic.  If you do not have a primary care provider, please call [phone] and they will assist you.        Care EveryWhere ID     This is your Care EveryWhere ID. This could be used by other organizations to access your [place] medical records  [id number]         Blood Pressure from Last 3 Encounters:   05/31/18 131/83   05/17/18 133/81   04/03/18 125/83    Weight from Last 3 Encounters:   05/31/18 (!) 154.3 kg (340 lb 3.2 oz)   05/17/18 (!) 151.1 kg (333 lb 3.2 oz)   05/01/18 149.3 kg (329 lb 1.6 oz)              We Performed the Following     HC PT EVAL, LOW COMPLEXITY     [facility] CERT REPORT     MANUAL THER TECH,1+REGIONS,EA 15 MIN     THERAPEUTIC EXERCISES        Primary Care Provider Office Phone # Fax #    Ksenia Shady Lyles [phone] [address] [facility] [address]        Goals        General    I will continue to attend Psychiatry appointments for medication management, 1/14/2014 (pt-stated)     Notes - Note edited  8/24/2016  4:04 PM by Dalila Cavazos LSW    As of today's date 8/24/2016 goal is met at 51 - 75%.    Goal Status:  Ongoing Sees  Therapist every other week and Psych PA every 3 weeks.  As of today's date 5/25/2016 goal is met at 51 - 75%.   Goal Status:  Showing progress-  Meeting with Psych PA for second time tomorrow  To review meds As of today's date 4/11/2016 goal is met at 51 - 75%.   Goal Status:  Showing progress  As of today's date 1/14/2014 goal is met at 51 - 75%.   Goal Status:  Active        I will schedule therapy with new counselor, 1/14/2014 (pt-stated)     Notes - Note edited  5/25/2016  1:54 PM by Dalila Cavazos LSW    As of today's date 5/25/2016 goal is met at 76 - 100%.   Goal Status:  Ongoing New therapist and Psych PA on a regular basis  As of today's date 5/10/2016 goal is met at 76 - 100%.   Goal Status:  Ongoing met with new therapist at [facility]  As of today's date 4/11/2016 goal is met at 51 - 75%.   Goal Status:  Ongoing meets with therapist regularly  As of today's date 1/14/2014 goal is met at 0 - 25%.   Goal Status:  Active        Psychosocial I need some help with cleaning (pt-stated)     Notes - Note edited  6/23/2016  1:57 PM by Dalila Cavazos LSW    As of today's date 6/23/2016 goal is met at 26 - 50%.   Goal Status:  Showing progress met with [facility]. Has not yet made a decision  As of today's date 5/25/2016 goal is met at 0 - 25%.   Goal Status:  Ongoing agency had to reschedule appt.  As of today's date 5/10/2016 goal is met at 0 - 25%.   Goal Status:  Active referral to [facility]        Equal Access to Services     [facility]: Hadii floyd Darby, luisda donna, qaisabell philippe. So [facility] [phone].    ATENCIÓN: Si habla español, tiene a faustin disposición servicios gratuitos de asistencia lingüística. Llame al [phone].    We comply with applicable federal civil rights laws and [place] laws. We do not discriminate on the basis of race, color, national origin, age, disability, sex, sexual orientation,  or gender identity.            Thank you!     Thank you for choosing [facility]  for your care. Our goal is always to provide you with excellent care. Hearing back from our patients is one way we can continue to improve our services. Please take a few minutes to complete the written survey that you may receive in the mail after your visit with us. Thank you!             Your Updated Medication List - Protect others around you: Learn how to safely use, store and throw away your medicines at www.disposemymeds.org.          This list is accurate as of 5/31/18  1:04 PM.  Always use your most recent med list.                   Brand Name Dispense Instructions for use Diagnosis    acetaminophen 500 MG Caps     60 capsule    Take 500 mg by mouth every 4 hours as needed        * albuterol 108 (90 Base) MCG/ACT Inhaler    PROAIR HFA/PROVENTIL HFA/VENTOLIN HFA     Inhale 2 puffs into the lungs every 4 hours as needed        * albuterol (2.5 MG/3ML) 0.083% neb solution      Inhale 2.5 mg into the lungs        ALPRAZolam 1 MG 24 hr tablet    XANAX XR     Take 1 mg by mouth every morning        asenapine 10 MG Subl sublingual tablet    SAPHRIS     Place 10 mg under the tongue        atorvastatin 40 MG tablet    LIPITOR    90 tablet    TAKE 1 TABLET (40 MG) BY MOUTH DAILY    Mixed hyperlipidemia       celecoxib 200 MG capsule    celeBREX    180 capsule    TAKE 1 CAPSULE BY MOUTH TWICE DAILY AS NEEDED FORMODERATE PAIN    Chronic midline low back pain without sciatica       cetirizine 10 MG tablet    zyrTEC    30 tablet    Take 1 tablet (10 mg) by mouth At Bedtime    Cough       cyanocobalamin 1000 MCG/ML injection    VITAMIN B12    10 mL    Inject 1 mL (1,000 mcg) into the muscle every 30 days    B12 deficiency       EPINEPHrine 0.3 MG/0.3ML injection 2-pack    EPIPEN/ADRENACLICK/or ANY BX GENERIC EQUIV    0.6 mL    Inject 0.3 mLs (0.3 mg) into the muscle once as needed for anaphylaxis    Food allergy       ginger root 550 MG  Caps capsule      Take 550 mg by mouth Up to three times daily        hydrOXYzine 50 MG tablet    ATARAX     Take 50 mg by mouth daily        INFLIXIMAB IV           lamoTRIgine 25 MG tablet    LaMICtal     Take 200 mg by mouth daily        levothyroxine 75 MCG tablet    SYNTHROID/LEVOTHROID    90 tablet    Take 1 tablet (75 mcg) by mouth every morning    Acquired hypothyroidism       loperamide 2 MG capsule    IMODIUM     Take 2 mg by mouth 4 times daily as needed for diarrhea        metoprolol succinate 200 MG 24 hr tablet    TOPROL-XL    90 tablet    Take 1 tablet (200 mg) by mouth daily    Essential hypertension with goal blood pressure less than 140/90       montelukast 10 MG tablet    SINGULAIR     Take 10 mg by mouth        omega-3 acid ethyl esters 1 g capsule    LOVAZA    360 capsule    Take 2 capsules (2 g) by mouth 2 times daily    Hypertriglyceridemia       omeprazole 20 MG tablet      Take 20 mg by mouth daily        order for DME     12 each    Equipment being ordered: 1 ml tuberculin syringes to be used for Vitamin B12 injections.    Vitamin B12 deficiency (non anaemic)       order for DME     1 Units    SI-loc belt    SI (sacroiliac) joint dysfunction       order for DME      Respironics REMSTAR 60 Series Auto CPAP 10-12 cm H2O, Wisp nasal mask w/a large cushion and a chinstrap        oxyCODONE IR 5 MG tablet    ROXICODONE    35 tablet    Take 1-2 tablets (5-10 mg) by mouth every 6 hours as needed for pain (maximum 4 tablet(s) per day)    Facet arthropathy       pregabalin 75 MG capsule    LYRICA    60 capsule    Take 1 capsule (75 mg) by mouth 2 times daily    Chronic midline low back pain without sciatica       ramipril 5 MG capsule    ALTACE    90 capsule    TAKE 1 CAPSULE BY MOUTH DAILY    Hypertension goal BP (blood pressure) < 140/90       RISPERDAL PO      Take 1 mg by mouth        tiZANidine 4 MG tablet    ZANAFLEX    90 tablet    Take 1 tablet (4 mg) by mouth 3 times daily as needed for  muscle spasms    Dorsalgia       vitamin D3 36208 UNITS capsule    CHOLECALCIFEROL    24 capsule    Take one capsule every two weeks.    Vitamin D deficiency       * Notice:  This list has 2 medication(s) that are the same as other medications prescribed for you. Read the directions carefully, and ask your doctor or other care provider to review them with you.

## 2018-05-31 NOTE — MR AVS SNAPSHOT
After Visit Summary   5/31/2018    Joel Pineda    MRN: [id number]           Patient Information     Date Of Birth          1975        Visit Information        Provider Department      5/31/2018 9:20 AM Oneil Baxter MD [facility]        Today's Diagnoses     Ankylosing spondylitis of sacral region (H)    -  1    High risk medication use           Follow-ups after your visit        Your next 10 appointments already scheduled     May 31, 2018 11:30 AM CDT   (Arrive by 11:15 AM)   [facility] Spine with Orestes LIN Sondrol, PT   [facility] ([facility] [place])    [address] [facility]  [address]  [place] [address]   [phone]            Jun 07, 2018  2:00 PM CDT   [facility] Spine with Orestes LIN Sondrol, PT   [facility] ([facility] [place])    [address] [facility]  [address]  [place] [address]   [phone]            Jun 14, 2018  2:00 PM CDT   [facility] Spine with Orestes Floresdrol, PT   [facility] ([facility] [place])    [address] [facility]  [address]  [place] [address]   [phone]            Jun 26, 2018  1:00 PM CDT   Level 3 with [address] ([facility])    [address] [place] [address] [address]  [facility] [address] [phone]            Jun 26, 2018  2:30 PM CDT   SHORT with Radha Mcdonald [facility] ([facility])    [address] [place] [address] [address]  [id number]  [facility] [address] [phone]            Sep 19, 2018  2:40 PM CDT   Return Visit with Oneil Baxter MD   [place] [facility] ([facility])    [address] [facility]  [place] [address]   [phone]              Who to contact     If you have questions or need follow up information about today's clinic visit or your schedule please contact [facility] directly at [phone].  Normal or non-critical lab and imaging results will be communicated to you by MyChart, letter or phone within 4 business  "days after the clinic has received the results. If you do not hear from us within 7 days, please contact the clinic through [employer] or phone. If you have a critical or abnormal lab result, we will notify you by phone as soon as possible.  Submit refill requests through [employer] or call your pharmacy and they will forward the refill request to us. Please allow 3 business days for your refill to be completed.          Additional Information About Your Visit        [employer] Information     [employer] gives you secure access to your electronic health record. If you see a primary care provider, you can also send messages to your care team and make appointments. If you have questions, please call your primary care clinic.  If you do not have a primary care provider, please call [phone] and they will assist you.        Care EveryWhere ID     This is your Care EveryWhere ID. This could be used by other organizations to access your [place] medical records  [id number]        Your Vitals Were     Pulse Respirations Height Pulse Oximetry BMI (Body Mass Index)       86 16 1.956 m (6' 5\") 97% 40.34 kg/m2        Blood Pressure from Last 3 Encounters:   05/31/18 131/83   05/17/18 133/81   04/03/18 125/83    Weight from Last 3 Encounters:   05/31/18 (!) 154.3 kg (340 lb 3.2 oz)   05/17/18 (!) 151.1 kg (333 lb 3.2 oz)   05/01/18 149.3 kg (329 lb 1.6 oz)              We Performed the Following     CBC with platelets differential        Primary Care Provider Office Phone # Fax #    Ksenia Shady Lyles [phone] [address] [facility] [address]        Goals        General    I will continue to attend Psychiatry appointments for medication management, 1/14/2014 (pt-stated)     Notes - Note edited  8/24/2016  4:04 PM by Dalila Cavazos LSW    As of today's date 8/24/2016 goal is met at 51 - 75%.   Goal Status:  Ongoing Sees  Therapist every other week and Psych PA every 3 weeks.  As of " today's date 5/25/2016 goal is met at 51 - 75%.   Goal Status:  Showing progress-  Meeting with Psych PA for second time tomorrow  To review meds As of today's date 4/11/2016 goal is met at 51 - 75%.   Goal Status:  Showing progress  As of today's date 1/14/2014 goal is met at 51 - 75%.   Goal Status:  Active        I will schedule therapy with new counselor, 1/14/2014 (pt-stated)     Notes - Note edited  5/25/2016  1:54 PM by Dalila Cavazos LSW    As of today's date 5/25/2016 goal is met at 76 - 100%.   Goal Status:  Ongoing New therapist and Psych PA on a regular basis  As of today's date 5/10/2016 goal is met at 76 - 100%.   Goal Status:  Ongoing met with new therapist at [facility]  As of today's date 4/11/2016 goal is met at 51 - 75%.   Goal Status:  Ongoing meets with therapist regularly  As of today's date 1/14/2014 goal is met at 0 - 25%.   Goal Status:  Active        Psychosocial I need some help with cleaning (pt-stated)     Notes - Note edited  6/23/2016  1:57 PM by Dalila Cavazos LSW    As of today's date 6/23/2016 goal is met at 26 - 50%.   Goal Status:  Showing progress met with [facility]. Has not yet made a decision  As of today's date 5/25/2016 goal is met at 0 - 25%.   Goal Status:  Ongoing agency had to reschedule appt.  As of today's date 5/10/2016 goal is met at 0 - 25%.   Goal Status:  Active referral to [facility]        Equal Access to Services     [facility]: Hadii floyd ashley hadashcrissy Soisaac, waaxda luqadaha, qaybta kaalmada isabell ojeda. So [facility] [phone].    ATENCIÓN: Si habla español, tiene a faustin disposición servicios gratuitos de asistencia lingüística. Llame al [phone].    We comply with applicable federal civil rights laws and [place] laws. We do not discriminate on the basis of race, color, national origin, age, disability, sex, sexual orientation, or gender identity.            Thank you!     Thank you for choosing [facility]  [facility]  for your care. Our goal is always to provide you with excellent care. Hearing back from our patients is one way we can continue to improve our services. Please take a few minutes to complete the written survey that you may receive in the mail after your visit with us. Thank you!             Your Updated Medication List - Protect others around you: Learn how to safely use, store and throw away your medicines at www.disposemymeds.org.          This list is accurate as of 5/31/18  9:49 AM.  Always use your most recent med list.                   Brand Name Dispense Instructions for use Diagnosis    acetaminophen 500 MG Caps     60 capsule    Take 500 mg by mouth every 4 hours as needed        * albuterol 108 (90 Base) MCG/ACT Inhaler    PROAIR HFA/PROVENTIL HFA/VENTOLIN HFA     Inhale 2 puffs into the lungs every 4 hours as needed        * albuterol (2.5 MG/3ML) 0.083% neb solution      Inhale 2.5 mg into the lungs        ALPRAZolam 1 MG 24 hr tablet    XANAX XR     Take 1 mg by mouth every morning        asenapine 10 MG Subl sublingual tablet    SAPHRIS     Place 10 mg under the tongue        atorvastatin 40 MG tablet    LIPITOR    90 tablet    TAKE 1 TABLET (40 MG) BY MOUTH DAILY    Mixed hyperlipidemia       celecoxib 200 MG capsule    celeBREX    180 capsule    TAKE 1 CAPSULE BY MOUTH TWICE DAILY AS NEEDED FORMODERATE PAIN    Chronic midline low back pain without sciatica       cetirizine 10 MG tablet    zyrTEC    30 tablet    Take 1 tablet (10 mg) by mouth At Bedtime    Cough       cyanocobalamin 1000 MCG/ML injection    VITAMIN B12    10 mL    Inject 1 mL (1,000 mcg) into the muscle every 30 days    B12 deficiency       EPINEPHrine 0.3 MG/0.3ML injection 2-pack    EPIPEN/ADRENACLICK/or ANY BX GENERIC EQUIV    0.6 mL    Inject 0.3 mLs (0.3 mg) into the muscle once as needed for anaphylaxis    Food allergy       ginger root 550 MG Caps capsule      Take 550 mg by mouth Up to three times daily         hydrOXYzine 50 MG tablet    ATARAX     Take 50 mg by mouth daily        INFLIXIMAB IV           lamoTRIgine 25 MG tablet    LaMICtal     Take 200 mg by mouth daily        levothyroxine 75 MCG tablet    SYNTHROID/LEVOTHROID    90 tablet    Take 1 tablet (75 mcg) by mouth every morning    Acquired hypothyroidism       loperamide 2 MG capsule    IMODIUM     Take 2 mg by mouth 4 times daily as needed for diarrhea        metoprolol succinate 200 MG 24 hr tablet    TOPROL-XL    90 tablet    Take 1 tablet (200 mg) by mouth daily    Essential hypertension with goal blood pressure less than 140/90       montelukast 10 MG tablet    SINGULAIR     Take 10 mg by mouth        omega-3 acid ethyl esters 1 g capsule    LOVAZA    360 capsule    Take 2 capsules (2 g) by mouth 2 times daily    Hypertriglyceridemia       omeprazole 20 MG tablet      Take 20 mg by mouth daily        order for DME     12 each    Equipment being ordered: 1 ml tuberculin syringes to be used for Vitamin B12 injections.    Vitamin B12 deficiency (non anaemic)       order for DME     1 Units    SI-loc belt    SI (sacroiliac) joint dysfunction       order for DME      Respironics REMSTAR 60 Series Auto CPAP 10-12 cm H2O, Wisp nasal mask w/a large cushion and a chinstrap        oxyCODONE IR 5 MG tablet    ROXICODONE    35 tablet    Take 1-2 tablets (5-10 mg) by mouth every 6 hours as needed for pain (maximum 4 tablet(s) per day)    Facet arthropathy       pregabalin 75 MG capsule    LYRICA    60 capsule    Take 1 capsule (75 mg) by mouth 2 times daily    Chronic midline low back pain without sciatica       ramipril 5 MG capsule    ALTACE    90 capsule    TAKE 1 CAPSULE BY MOUTH DAILY    Hypertension goal BP (blood pressure) < 140/90       RISPERDAL PO      Take 1 mg by mouth        tiZANidine 4 MG tablet    ZANAFLEX    90 tablet    Take 1 tablet (4 mg) by mouth 3 times daily as needed for muscle spasms    Dorsalgia       vitamin D3 10318 UNITS capsule     CHOLECALCIFEROL    24 capsule    Take one capsule every two weeks.    Vitamin D deficiency       * Notice:  This list has 2 medication(s) that are the same as other medications prescribed for you. Read the directions carefully, and ask your doctor or other care provider to review them with you.

## 2018-06-02 ENCOUNTER — MYC REFILL (OUTPATIENT)
Dept: FAMILY MEDICINE | Facility: CLINIC | Age: 43
End: 2018-06-02

## 2018-06-02 DIAGNOSIS — M47.819 FACET ARTHROPATHY: ICD-10-CM

## 2018-06-04 RX ORDER — OXYCODONE HYDROCHLORIDE 5 MG/1
5-10 TABLET ORAL EVERY 6 HOURS PRN
Qty: 35 TABLET | Refills: 0 | Status: SHIPPED | OUTPATIENT
Start: 2018-06-04 | End: 2018-07-06

## 2018-06-04 NOTE — TELEPHONE ENCOUNTER
Message from [employer]:  Original authorizing provider: MD Tito Dickey SONIALopez Pineda would like a refill of the following medications:  oxyCODONE IR (ROXICODONE) 5 MG tablet [Ksenia Lyles MD]    Preferred pharmacy: [facility] -     Comment:  Thx.

## 2018-06-04 NOTE — TELEPHONE ENCOUNTER
Requested Prescriptions   Pending Prescriptions Disp Refills     oxyCODONE IR (ROXICODONE) 5 MG tablet 35 tablet 0     Sig: Take 1-2 tablets (5-10 mg) by mouth every 6 hours as needed for pain (maximum 4 tablet(s) per day)    There is no refill protocol information for this order        oxyCODONE IR (ROXICODONE) 5 MG tablet      Last Written Prescription Date:  5/8/18  Last Fill Quantity: 35,   # refills: 0  Last Office Visit: 5/17/18  Future Office visit:    Next 5 appointments (look out 90 days)     Jun 26, 2018  2:30 PM CDT   SHORT with Radha Mcdonald [facility] ([facility]    [address] [place] [address] [place]  [address] [place] [address] [phone]                   Routing refill request to provider for review/approval because:  Drug not on the G, P or [facility] refill protocol or controlled substance

## 2018-06-06 ENCOUNTER — TRANSFERRED RECORDS (OUTPATIENT)
Dept: HEALTH INFORMATION MANAGEMENT | Facility: CLINIC | Age: 43
End: 2018-06-06

## 2018-06-07 ENCOUNTER — THERAPY VISIT (OUTPATIENT)
Dept: PHYSICAL THERAPY | Facility: CLINIC | Age: 43
End: 2018-06-07
Payer: COMMERCIAL

## 2018-06-07 DIAGNOSIS — M45.6 ANKYLOSING SPONDYLITIS LUMBAR REGION (H): ICD-10-CM

## 2018-06-07 DIAGNOSIS — M53.3 SACROILIAC JOINT DYSFUNCTION: ICD-10-CM

## 2018-06-07 PROCEDURE — 97140 MANUAL THERAPY 1/> REGIONS: CPT | Mod: GP | Performed by: PHYSICAL THERAPIST

## 2018-06-07 PROCEDURE — 97112 NEUROMUSCULAR REEDUCATION: CPT | Mod: GP | Performed by: PHYSICAL THERAPIST

## 2018-06-07 PROCEDURE — 97110 THERAPEUTIC EXERCISES: CPT | Mod: GP | Performed by: PHYSICAL THERAPIST

## 2018-06-07 NOTE — MR AVS SNAPSHOT
After Visit Summary   6/7/2018    Joel Pineda    MRN: [id number]           Patient Information     Date Of Birth          1975        Visit Information        Provider Department      6/7/2018 2:00 PM Orestes [place], PT [facility]        Today's Diagnoses     Ankylosing spondylitis lumbar region (H)        Sacroiliac joint dysfunction           Follow-ups after your visit        Your next 10 appointments already scheduled     Jun 14, 2018  2:00 PM CDT   [facility] with Orestes [place], PT   [facility] ([facility] [place])    [address] [facility] [address]  [facility] [address]   [phone]            Jun 26, 2018  1:00 PM CDT   Level [address] with [address] ([facility])    [id number] [place] [address] [address]  [facility] [id number] [phone]            Jun 26, 2018  2:30 PM CDT   SHORT with [facility] ([facility])    [address] [place] [address] N  [id number]  [facility] [address] [phone]            Jun 28, 2018  2:00 PM CDT   [facility] Spine with Orestes Shel[place], PT   [facility] ([facility])    [address] [facility]  [address]  [facility] [address]   [phone]            Jul 05, 2018  2:00 PM CDT   [facility] Spine with Orestes Shel[place], PT   [facility] ([facility] [place])    [address] [facility] [address]  [facility] [address]   [phone]            Sep 19, 2018  2:40 PM CDT   Return Visit with Oneil Baxter MD   [facility] ([facility])    [address] [facility]  [place] [address] [phone]              Who to contact     If you have questions or need follow up information about today's clinic visit or your schedule please contact [facility] directly at [phone].  Normal or non-critical lab and imaging results will be communicated to you by MyChart, letter or phone within 4 business days after the clinic has received the  results. If you do not hear from us within 7 days, please contact the clinic through Nextt or phone. If you have a critical or abnormal lab result, we will notify you by phone as soon as possible.  Submit refill requests through Nextt or call your pharmacy and they will forward the refill request to us. Please allow 3 business days for your refill to be completed.          Additional Information About Your Visit        [employer] Information     Nextt gives you secure access to your electronic health record. If you see a primary care provider, you can also send messages to your care team and make appointments. If you have questions, please call your primary care clinic.  If you do not have a primary care provider, please call [phone] and they will assist you.        Care EveryWhere ID     This is your Care EveryWhere ID. This could be used by other organizations to access your [place] medical records  [id number]         Blood Pressure from Last 3 Encounters:   05/31/18 131/83   05/17/18 133/81   04/03/18 125/83    Weight from Last 3 Encounters:   05/31/18 (!) 154.3 kg (340 lb 3.2 oz)   05/17/18 (!) 151.1 kg (333 lb 3.2 oz)   05/01/18 149.3 kg (329 lb 1.6 oz)              We Performed the Following     MANUAL THER TECH,1+REGIONS,EA 15 MIN     NEUROMUSCULAR RE-EDUCATION     THERAPEUTIC EXERCISES        Primary Care Provider Office Phone # Fax #    Ksenia Shady L[place] [phone] [address] [facility] [address]        Goals        General    I will continue to attend Psychiatry appointments for medication management, 1/14/2014 (pt-stated)     Notes - Note edited  8/24/2016  4:04 PM by Dalila Cavazos LSW    As of today's date 8/24/2016 goal is met at 51 - 75%.   Goal Status:  Ongoing Sees  Therapist every other week and Psych PA every 3 weeks.  As of today's date 5/25/2016 goal is met at 51 - 75%.   Goal Status:  Showing progress-  Meeting with Psych PA for second  time tomorrow  To review meds As of today's date 4/11/2016 goal is met at 51 - 75%.   Goal Status:  Showing progress  As of today's date 1/14/2014 goal is met at 51 - 75%.   Goal Status:  Active        I will schedule therapy with new counselor, 1/14/2014 (pt-stated)     Notes - Note edited  5/25/2016  1:54 PM by Dalila Cavazos LSW    As of today's date 5/25/2016 goal is met at 76 - 100%.   Goal Status:  Ongoing New therapist and Psych PA on a regular basis  As of today's date 5/10/2016 goal is met at 76 - 100%.   Goal Status:  Ongoing met with new therapist at [facility]  As of today's date 4/11/2016 goal is met at 51 - 75%.   Goal Status:  Ongoing meets with therapist regularly  As of today's date 1/14/2014 goal is met at 0 - 25%.   Goal Status:  Active        Psychosocial I need some help with cleaning (pt-stated)     Notes - Note edited  6/23/2016  1:57 PM by Dalila Cavazos LSW    As of today's date 6/23/2016 goal is met at 26 - 50%.   Goal Status:  Showing progress met with [facility]. Has not yet made a decision  As of today's date 5/25/2016 goal is met at 0 - 25%.   Goal Status:  Ongoing agency had to reschedule appt.  As of today's date 5/10/2016 goal is met at 0 - 25%.   Goal Status:  Active referral to [facility]        Equal Access to Services     [facility] : Hadii floyd ashley hadasho Soomaali, waaxda luqadaha, qaybta kaalmada adeegyada, isabell lux ademaribel shankar . So [facility] [phone].    ATENCIÓN: Si habla español, tiene a faustin disposición servicios gratuitos de asistencia lingüística. Llame al [phone].    We comply with applicable federal civil rights laws and [place] laws. We do not discriminate on the basis of race, color, national origin, age, disability, sex, sexual orientation, or gender identity.            Thank you!     Thank you for choosing [facility]  for your care. Our goal is always to provide you with excellent care. Hearing back from our patients is one way we can  continue to improve our services. Please take a few minutes to complete the written survey that you may receive in the mail after your visit with us. Thank you!             Your Updated Medication List - Protect others around you: Learn how to safely use, store and throw away your medicines at www.disposemymeds.org.          This list is accurate as of 6/7/18  3:48 PM.  Always use your most recent med list.                   Brand Name Dispense Instructions for use Diagnosis    acetaminophen 500 MG Caps     60 capsule    Take 500 mg by mouth every 4 hours as needed        * albuterol 108 (90 Base) MCG/ACT Inhaler    PROAIR HFA/PROVENTIL HFA/VENTOLIN HFA     Inhale 2 puffs into the lungs every 4 hours as needed        * albuterol (2.5 MG/3ML) 0.083% neb solution      Inhale 2.5 mg into the lungs        ALPRAZolam 1 MG 24 hr tablet    XANAX XR     Take 1 mg by mouth every morning        asenapine 10 MG Subl sublingual tablet    SAPHRIS     Place 10 mg under the tongue        atorvastatin 40 MG tablet    LIPITOR    90 tablet    TAKE 1 TABLET (40 MG) BY MOUTH DAILY    Mixed hyperlipidemia       celecoxib 200 MG capsule    celeBREX    180 capsule    TAKE 1 CAPSULE BY MOUTH TWICE DAILY AS NEEDED FORMODERATE PAIN    Chronic midline low back pain without sciatica       cetirizine 10 MG tablet    zyrTEC    30 tablet    Take 1 tablet (10 mg) by mouth At Bedtime    Cough       cyanocobalamin 1000 MCG/ML injection    VITAMIN B12    10 mL    Inject 1 mL (1,000 mcg) into the muscle every 30 days    B12 deficiency       EPINEPHrine 0.3 MG/0.3ML injection 2-pack    EPIPEN/ADRENACLICK/or ANY BX GENERIC EQUIV    0.6 mL    Inject 0.3 mLs (0.3 mg) into the muscle once as needed for anaphylaxis    Food allergy       ginger root 550 MG Caps capsule      Take 550 mg by mouth Up to three times daily        hydrOXYzine 50 MG tablet    ATARAX     Take 50 mg by mouth daily        INFLIXIMAB IV           lamoTRIgine 25 MG tablet    LaMICtal      Take 200 mg by mouth daily        levothyroxine 75 MCG tablet    SYNTHROID/LEVOTHROID    90 tablet    Take 1 tablet (75 mcg) by mouth every morning    Acquired hypothyroidism       loperamide 2 MG capsule    IMODIUM     Take 2 mg by mouth 4 times daily as needed for diarrhea        metoprolol succinate 200 MG 24 hr tablet    TOPROL-XL    90 tablet    Take 1 tablet (200 mg) by mouth daily    Essential hypertension with goal blood pressure less than 140/90       montelukast 10 MG tablet    SINGULAIR     Take 10 mg by mouth        omega-3 acid ethyl esters 1 g capsule    LOVAZA    360 capsule    Take 2 capsules (2 g) by mouth 2 times daily    Hypertriglyceridemia       omeprazole 20 MG tablet      Take 20 mg by mouth daily        order for DME     12 each    Equipment being ordered: 1 ml tuberculin syringes to be used for Vitamin B12 injections.    Vitamin B12 deficiency (non anaemic)       order for DME     1 Units    SI-loc belt    SI (sacroiliac) joint dysfunction       order for DME      Respironics REMSTAR 60 Series Auto CPAP 10-12 cm H2O, Wisp nasal mask w/a large cushion and a chinstrap        oxyCODONE IR 5 MG tablet    ROXICODONE    35 tablet    Take 1-2 tablets (5-10 mg) by mouth every 6 hours as needed for pain (maximum 4 tablet(s) per day)    Facet arthropathy       pregabalin 75 MG capsule    LYRICA    60 capsule    Take 1 capsule (75 mg) by mouth 2 times daily    Chronic midline low back pain without sciatica       ramipril 5 MG capsule    ALTACE    90 capsule    TAKE 1 CAPSULE BY MOUTH DAILY    Hypertension goal BP (blood pressure) < 140/90       RISPERDAL PO      Take 1 mg by mouth        tiZANidine 4 MG tablet    ZANAFLEX    90 tablet    Take 1 tablet (4 mg) by mouth 3 times daily as needed for muscle spasms    Dorsalgia       vitamin D3 27755 UNITS capsule    CHOLECALCIFEROL    24 capsule    Take one capsule every two weeks.    Vitamin D deficiency       * Notice:  This list has 2 medication(s) that  are the same as other medications prescribed for you. Read the directions carefully, and ask your doctor or other care provider to review them with you.

## 2018-06-26 ENCOUNTER — INFUSION THERAPY VISIT (OUTPATIENT)
Dept: INFUSION THERAPY | Facility: CLINIC | Age: 43
End: 2018-06-26
Payer: COMMERCIAL

## 2018-06-26 ENCOUNTER — OFFICE VISIT (OUTPATIENT)
Dept: PHARMACY | Facility: CLINIC | Age: 43
End: 2018-06-26
Payer: COMMERCIAL

## 2018-06-26 VITALS
RESPIRATION RATE: 18 BRPM | HEART RATE: 84 BPM | TEMPERATURE: 98.9 F | BODY MASS INDEX: 40.89 KG/M2 | OXYGEN SATURATION: 96 % | DIASTOLIC BLOOD PRESSURE: 90 MMHG | WEIGHT: 315 LBS | SYSTOLIC BLOOD PRESSURE: 137 MMHG

## 2018-06-26 DIAGNOSIS — F41.8 DEPRESSION WITH ANXIETY: ICD-10-CM

## 2018-06-26 DIAGNOSIS — J45.21 INTERMITTENT ASTHMA, WITH ACUTE EXACERBATION: ICD-10-CM

## 2018-06-26 DIAGNOSIS — E78.5 HYPERLIPIDEMIA LDL GOAL <100: Primary | ICD-10-CM

## 2018-06-26 DIAGNOSIS — M45.8 ANKYLOSING SPONDYLITIS OF SACRAL REGION (H): ICD-10-CM

## 2018-06-26 DIAGNOSIS — M45.8 ANKYLOSING SPONDYLITIS OF SACRAL REGION (H): Primary | ICD-10-CM

## 2018-06-26 PROCEDURE — 96413 CHEMO IV INFUSION 1 HR: CPT | Performed by: INTERNAL MEDICINE

## 2018-06-26 PROCEDURE — 96415 CHEMO IV INFUSION ADDL HR: CPT | Performed by: INTERNAL MEDICINE

## 2018-06-26 PROCEDURE — 96375 TX/PRO/DX INJ NEW DRUG ADDON: CPT | Performed by: INTERNAL MEDICINE

## 2018-06-26 PROCEDURE — 99207 ZZC NO CHARGE NURSE ONLY: CPT

## 2018-06-26 PROCEDURE — 99607 MTMS BY PHARM ADDL 15 MIN: CPT | Performed by: PHARMACIST

## 2018-06-26 PROCEDURE — 99606 MTMS BY PHARM EST 15 MIN: CPT | Performed by: PHARMACIST

## 2018-06-26 RX ORDER — METHYLPREDNISOLONE SODIUM SUCCINATE 125 MG/2ML
125 INJECTION, POWDER, LYOPHILIZED, FOR SOLUTION INTRAMUSCULAR; INTRAVENOUS ONCE
Status: CANCELLED | OUTPATIENT
Start: 2018-06-26 | End: 2018-06-26

## 2018-06-26 RX ORDER — DIPHENHYDRAMINE HCL 25 MG
25 CAPSULE ORAL ONCE
Status: COMPLETED | OUTPATIENT
Start: 2018-06-26 | End: 2018-06-26

## 2018-06-26 RX ORDER — DIPHENHYDRAMINE HCL 25 MG
25 CAPSULE ORAL ONCE
Status: CANCELLED
Start: 2018-06-26 | End: 2018-06-26

## 2018-06-26 RX ORDER — METHYLPREDNISOLONE SODIUM SUCCINATE 125 MG/2ML
125 INJECTION, POWDER, LYOPHILIZED, FOR SOLUTION INTRAMUSCULAR; INTRAVENOUS ONCE
Status: COMPLETED | OUTPATIENT
Start: 2018-06-26 | End: 2018-06-26

## 2018-06-26 RX ORDER — ACETAMINOPHEN 325 MG/1
650 TABLET ORAL ONCE
Status: CANCELLED
Start: 2018-06-26 | End: 2018-06-26

## 2018-06-26 RX ORDER — RISPERIDONE 1 MG/1
1 TABLET ORAL DAILY
COMMUNITY
Start: 2018-06-06 | End: 2018-11-20

## 2018-06-26 RX ORDER — ACETAMINOPHEN 325 MG/1
650 TABLET ORAL ONCE
Status: COMPLETED | OUTPATIENT
Start: 2018-06-26 | End: 2018-06-26

## 2018-06-26 RX ORDER — PRAMIPEXOLE DIHYDROCHLORIDE 0.5 MG/1
TABLET ORAL
COMMUNITY
Start: 2018-06-06 | End: 2018-10-02

## 2018-06-26 RX ADMIN — METHYLPREDNISOLONE SODIUM SUCCINATE 125 MG: 125 INJECTION INTRAMUSCULAR; INTRAVENOUS at 13:34

## 2018-06-26 RX ADMIN — Medication 25 MG: at 13:30

## 2018-06-26 RX ADMIN — ACETAMINOPHEN 650 MG: 325 TABLET ORAL at 13:31

## 2018-06-26 RX ADMIN — Medication 250 ML: at 13:34

## 2018-06-26 ASSESSMENT — PAIN SCALES - GENERAL: PAINLEVEL: NO PAIN (0)

## 2018-06-26 NOTE — MR AVS SNAPSHOT
After Visit Summary   6/26/2018    Joel Pineda    MRN: [id number]           Patient Information     Date Of Birth          1975        Visit Information        Provider Department      6/26/2018 1:00 PM [place] [facility]        Today's Diagnoses     Ankylosing spondylitis of sacral region (H)    -  1       Follow-ups after your visit        Your next 10 appointments already scheduled     Jul 05, 2018  2:00 PM CDT   [facility] Spine with Orestes [place], PT   [facility] ([facility])    [address] [facility] [address]  [facility] [address]   [phone]            Jul 12, 2018  1:20 PM CDT   MyChart Physical Therapy Spine Follow Up with Orestes [place], PT   [facility] ([facility])    [address] [facility] [address]  [facility] [address]   [phone]           If you have your physician's order in hand, please bring it with you to your appointment            Aug 21, 2018  1:00 PM CDT   Level 3 with [place] [facility] ([facility])    [address] [place] [id number] [phone]            Sep 19, 2018  2:40 PM CDT   [facility] Visit with Oneil Baxter MD   [facility]    [address] [facility] [address] [phone]              Future tests that were ordered for you today     Open Future Orders        Priority Expected Expires Ordered    Lipid panel reflex to direct LDL Fasting Routine 7/3/2018 6/26/2019 6/26/2018            Who to contact     If you have questions or need follow up information about today's clinic visit or your schedule please contact [facility] directly at [phone].  Normal or non-critical lab and imaging results will be communicated to you by MyChart, letter or phone within 4 business days after the clinic has received the results. If you do not hear from us within 7 days, please contact the clinic  through Aud[employer] or phone. If you have a critical or abnormal lab result, we will notify you by phone as soon as possible.  Submit refill requests through Audicus or call your pharmacy and they will forward the refill request to us. Please allow 3 business days for your refill to be completed.          Additional Information About Your Visit        [employer] Information     Aud[employer] gives you secure access to your electronic health record. If you see a primary care provider, you can also send messages to your care team and make appointments. If you have questions, please call your primary care clinic.  If you do not have a primary care provider, please call [phone] and they will assist you.      Audicus is an electronic gateway that provides easy, online access to your medical records. With Audicus, you can request a clinic appointment, read your test results, renew a prescription or communicate with your care team.     To access your existing account, please contact your [facility] or call [phone] for assistance.        Care EveryWhere ID     This is your Care EveryWhere ID. This could be used by other organizations to access your [place] medical records  [id number]        Your Vitals Were     Pulse Temperature Respirations Pulse Oximetry BMI (Body Mass Index)       84 98.9  F (37.2  C) (Oral) 18 96% 40.89 kg/m2        Blood Pressure from Last 3 Encounters:   06/26/18 137/90   05/31/18 131/83   05/17/18 133/81    Weight from Last 3 Encounters:   06/26/18 (!) 156.4 kg (344 lb 12.8 oz)   05/31/18 (!) 154.3 kg (340 lb 3.2 oz)   05/17/18 (!) 151.1 kg (333 lb 3.2 oz)              Today, you had the following     No orders found for display       Primary Care Provider Office Phone # Fax #    Ksenia Lyles [phone] [address] [facility] [address]        Goals        General    I will continue to attend Psychiatry appointments for  medication management, 1/14/2014 (pt-stated)     Notes - Note edited  8/24/2016  4:04 PM by Dalila Cavazos LSW    As of today's date 8/24/2016 goal is met at 51 - 75%.   Goal Status:  Ongoing Sees  Therapist every other week and Psych PA every 3 weeks.  As of today's date 5/25/2016 goal is met at 51 - 75%.   Goal Status:  Showing progress-  Meeting with Psych PA for second time tomorrow  To review meds As of today's date 4/11/2016 goal is met at 51 - 75%.   Goal Status:  Showing progress  As of today's date 1/14/2014 goal is met at 51 - 75%.   Goal Status:  Active        I will schedule therapy with new counselor, 1/14/2014 (pt-stated)     Notes - Note edited  5/25/2016  1:54 PM by Dalila Cavazos LSW    As of today's date 5/25/2016 goal is met at 76 - 100%.   Goal Status:  Ongoing New therapist and Psych PA on a regular basis  As of today's date 5/10/2016 goal is met at 76 - 100%.   Goal Status:  Ongoing met with new therapist at [facility]  As of today's date 4/11/2016 goal is met at 51 - 75%.   Goal Status:  Ongoing meets with therapist regularly  As of today's date 1/14/2014 goal is met at 0 - 25%.   Goal Status:  Active        Psychosocial I need some help with cleaning (pt-stated)     Notes - Note edited  6/23/2016  1:57 PM by Dalila Cavazos LSW    As of today's date 6/23/2016 goal is met at 26 - 50%.   Goal Status:  Showing progress met with [employer]. Has not yet made a decision  As of today's date 5/25/2016 goal is met at 0 - 25%.   Goal Status:  Ongoing agency had to reschedule appt.  As of today's date 5/10/2016 goal is met at 0 - 25%.   Goal Status:  Active referral to [facility]        Equal Access to Services     [facility] JEROME AH: Madhuri Darby, waaxda luqadaha, qaybta kaalmafabrice ojeda, isabell echevarria. So [facility] [phone].    ATENCIÓN: Si habla español, tiene a faustin disposición servicios gratuitos de asistencia lingüística. Llame al [phone].    We  comply with applicable federal civil rights laws and [place] laws. We do not discriminate on the basis of race, color, national origin, age, disability, sex, sexual orientation, or gender identity.            Thank you!     Thank you for choosing [facility]  for your care. Our goal is always to provide you with excellent care. Hearing back from our patients is one way we can continue to improve our services. Please take a few minutes to complete the written survey that you may receive in the mail after your visit with us. Thank you!             Your Updated Medication List - Protect others around you: Learn how to safely use, store and throw away your medicines at www.disposemymeds.org.          This list is accurate as of 6/26/18  3:54 PM.  Always use your most recent med list.                   Brand Name Dispense Instructions for use Diagnosis    acetaminophen 500 MG Caps     60 capsule    Take 500 mg by mouth every 4 hours as needed        * albuterol 108 (90 Base) MCG/ACT Inhaler    PROAIR HFA/PROVENTIL HFA/VENTOLIN HFA     Inhale 2 puffs into the lungs every 4 hours as needed        * albuterol (2.5 MG/3ML) 0.083% neb solution      Inhale 2.5 mg into the lungs        ALPRAZolam 1 MG 24 hr tablet    XANAX XR     Take 1 mg by mouth every morning        atorvastatin 40 MG tablet    LIPITOR    90 tablet    TAKE 1 TABLET (40 MG) BY MOUTH DAILY    Mixed hyperlipidemia       celecoxib 200 MG capsule    celeBREX    180 capsule    TAKE 1 CAPSULE BY MOUTH TWICE DAILY AS NEEDED FORMODERATE PAIN    Chronic midline low back pain without sciatica       cetirizine 10 MG tablet    zyrTEC    30 tablet    Take 1 tablet (10 mg) by mouth At Bedtime    Cough       cyanocobalamin 1000 MCG/ML injection    VITAMIN B12    10 mL    Inject 1 mL (1,000 mcg) into the muscle every 30 days    B12 deficiency       EPINEPHrine 0.3 MG/0.3ML injection 2-pack    EPIPEN/ADRENACLICK/or ANY BX GENERIC EQUIV    0.6 mL    Inject  0.3 mLs (0.3 mg) into the muscle once as needed for anaphylaxis    Food allergy       ginger root 550 MG Caps capsule      Take 550 mg by mouth Up to three times daily        hydrOXYzine 50 MG tablet    ATARAX     Take 50 mg by mouth daily        INFLIXIMAB IV           lamoTRIgine 25 MG tablet    LaMICtal     Take 200 mg by mouth daily        levothyroxine 75 MCG tablet    SYNTHROID/LEVOTHROID    90 tablet    Take 1 tablet (75 mcg) by mouth every morning    Acquired hypothyroidism       loperamide 2 MG capsule    IMODIUM     Take 2 mg by mouth 4 times daily as needed for diarrhea        metoprolol succinate 200 MG 24 hr tablet    TOPROL-XL    90 tablet    Take 1 tablet (200 mg) by mouth daily    Essential hypertension with goal blood pressure less than 140/90       montelukast 10 MG tablet    SINGULAIR     Take 10 mg by mouth        omega-3 acid ethyl esters 1 g capsule    LOVAZA    360 capsule    Take 2 capsules (2 g) by mouth 2 times daily    Hypertriglyceridemia       omeprazole 20 MG tablet      Take 20 mg by mouth daily        order for DME     12 each    Equipment being ordered: 1 ml tuberculin syringes to be used for Vitamin B12 injections.    Vitamin B12 deficiency (non anaemic)       order for DME     1 Units    SI-loc belt    SI (sacroiliac) joint dysfunction       order for DME      Respironics REMSTAR 60 Series Auto CPAP 10-12 cm H2O, Wisp nasal mask w/a large cushion and a chinstrap        oxyCODONE IR 5 MG tablet    ROXICODONE    35 tablet    Take 1-2 tablets (5-10 mg) by mouth every 6 hours as needed for pain (maximum 4 tablet(s) per day)    Facet arthropathy       pramipexole 0.5 MG tablet    MIRAPEX     Take 1/2 tab by mouth daily. Every 3 days, increase by 1/2 tab until taking a total of 4 tabs daily.        pregabalin 75 MG capsule    LYRICA    60 capsule    Take 1 capsule (75 mg) by mouth 2 times daily    Chronic midline low back pain without sciatica       ramipril 5 MG capsule    ALTACE    90  capsule    TAKE 1 CAPSULE BY MOUTH DAILY    Hypertension goal BP (blood pressure) < 140/90       risperiDONE 1 MG tablet    risperDAL     Take 1 mg by mouth daily        tiZANidine 4 MG tablet    ZANAFLEX    90 tablet    Take 1 tablet (4 mg) by mouth 3 times daily as needed for muscle spasms    Dorsalgia       vitamin D3 16762 UNITS capsule    CHOLECALCIFEROL    24 capsule    Take one capsule every two weeks.    Vitamin D deficiency       * Notice:  This list has 2 medication(s) that are the same as other medications prescribed for you. Read the directions carefully, and ask your doctor or other care provider to review them with you.

## 2018-06-26 NOTE — PROGRESS NOTES
SUBJECTIVE/OBJECTIVE:                           Jeol Pineda is a 42 year old male seen for a follow-up for Medication Therapy Management in the infusion center.  He was referred to me from Ksenia Lyles.     Chief Complaint: Follow-up from visit on 5/1/2018.    Allergies/ADRs: Reviewed in Epic  Tobacco: No tobacco use  Alcohol: none  Caffeine: patient has decreased his caffeine intake and is drinking about 15 ounces of caffeinated coffee and 15 ounces of decaf.    Ankylosing Spondylitis: Current medications include Remicade 800 mg IV once, celecoxib 200 mg bid. Patient feels celecoxib bid is helping. . He does feel the remicade has been helping as the area of pain is less and he is experiencing less stiffness. Patient states when he gets home from his infusion he does experience some upper left quadrant pain. This lasts off and on for about 16 hours after receiving the infusion. Patient has been taking oxycodone for this pain and doesn't always seem to be helpful.    Mood/Anxiety: current therapy includes lamictal 200mg daily, Xanax XR 1mg daily, pramipexole 1mg daily, tapering up to 2mg daily, risperidone 1mg daily. Patient was recently started on pramipexole and risperidone. Patient feels his mood has been low and knows it will take some time for new medications to be effective. Patient follows up with psychiatry on next week. Patient sees his therapist every other week. Patient denies side effects.    Asthma:  Current asthma medications: Albuterol MDI and Nebs and Montelukast (Singulair) once daily.  Asthma triggers include: humidity. Patient had a recent exacerbation of asthma with increased coughing in the middle of the night. Patient was placed back on montelukast and albuterol dosage was increased during time of exacerbation. Patient feels this has improved and has not had to use albuterol over the last week. Patient attributes this exacerbation to the humidity.   Pt reports the following  symptoms: none.  AAP on file: YES  ACT Total Scores 11/1/2016 7/10/2017 1/29/2018   ACT TOTAL SCORE - - -   ASTHMA ER VISITS - - -   ASTHMA HOSPITALIZATIONS - - -   ACT TOTAL SCORE (Goal Greater than or Equal to 20) 21 23 25   In the past 12 months, how many times did you visit the emergency room for your asthma without being admitted to the hospital? 1 0 0   In the past 12 months, how many times were you hospitalized overnight because of your asthma? 0 0 0       Hyperlipidemia: Current therapy includes atorvastatin 40mg once daily and Lovaza 2gm bid.  Pt reports no significant myalgias or other side effects.   The 10-year ASCVD risk score (Vanna PHIPPS Jr, et al., 2013) is: 8.1%    Values used to calculate the score:      Age: 42 years      Sex: Male      Is Non- : No      Diabetic: Yes      Tobacco smoker: No      Systolic Blood Pressure: 137 mmHg      Is BP treated: Yes      HDL Cholesterol: 27 mg/dL      Total Cholesterol: 178 mg/dL     Current labs include:  Today's Vitals: There were no vitals taken for this visit.-see OV for today  BP Readings from Last 3 Encounters:   06/26/18 137/90   05/31/18 131/83   05/17/18 133/81     Lab Results   Component Value Date    A1C 5.8 05/01/2018   .  Lab Results   Component Value Date    CHOL 178 05/08/2018     Lab Results   Component Value Date    TRIG 998 05/08/2018     Lab Results   Component Value Date    HDL 27 05/08/2018     Lab Results   Component Value Date    LDL  05/08/2018     Cannot estimate LDL when triglyceride exceeds 400 mg/dL    LDL 56 05/08/2018       Liver Function Studies -   Recent Labs   Lab Test  05/17/18   1417   PROTTOTAL  7.7   ALBUMIN  4.2   BILITOTAL  0.4   ALKPHOS  106   AST  40   ALT  48       Lab Results   Component Value Date    UCRR 99 03/12/2018    MICROL <5 01/18/2018    UMALCR Unable to calculate due to low value 01/18/2018       Last Basic Metabolic Panel:  Lab Results   Component Value Date     04/19/2018       Lab Results   Component Value Date    POTASSIUM 3.9 04/19/2018     Lab Results   Component Value Date    CHLORIDE 104 04/19/2018     Lab Results   Component Value Date    BUN 12 04/19/2018     Lab Results   Component Value Date    CR 0.97 04/19/2018     GFR Estimate   Date Value Ref Range Status   04/19/2018 84 >60 mL/min/1.7m2 Final     Comment:     Non  GFR Calc   03/12/2018 60 (L) >60 mL/min/1.7m2 Final     Comment:     Non  GFR Calc   01/18/2018 73 >60 mL/min/1.7m2 Final     Comment:     Non  GFR Calc     GFR Estimate If Black   Date Value Ref Range Status   04/19/2018 >90 >60 mL/min/1.7m2 Final     Comment:      GFR Calc   03/12/2018 72 >60 mL/min/1.7m2 Final     Comment:      GFR Calc   01/18/2018 88 >60 mL/min/1.7m2 Final     Comment:      GFR Calc       Most Recent Immunizations   Administered Date(s) Administered     Influenza (IIV3) PF 09/09/2013     Influenza Vaccine IM 3yrs+ 4 Valent IIV4 10/11/2016     Pneumo Conj 13-V (2010&after) 10/02/2015     Pneumococcal 23 valent 10/11/2016     TD (ADULT, 7+) 10/04/2002     TDAP Vaccine (Adacel) 07/16/2010       ASSESSMENT:                             Current medications were reviewed today.     Medication Adherence: no issues identified    Ankylosing Spondylitis: stable; remicade can cause some abdominal pain. Rheumatology aware. Will continue to monitor.    Asthma: Needs improvement. Patient will need updated ACT at next visit.    Mood/Anxiety: Stable.  Patient in close follow-up with psychiatry.    Hyperlipidemia: Stable. Patient may benefit from updated lipid labs.    PLAN:                            Updated lipid labs.  ACT at next visit.    I spent 30 minutes with this patient today. All changes were made via collaborative practice agreement with Ksenia Lyles. A copy of the visit note was provided to the patient's primary care provider.    Will follow  up in 8 weeks.    The patient was sent via Bubbles a summary of these recommendations as an after visit summary.     Radha Mcdonald, Pharm.D, BCACP  Medication Therapy Management Pharmacist

## 2018-06-26 NOTE — MR AVS SNAPSHOT
After Visit Summary   6/26/2018    Joel Pineda    MRN: [id number]           Patient Information     Date Of Birth          1975        Visit Information        Provider Department      6/26/2018 2:30 PM Radha Mcdonald, [facility] MTM        Today's Diagnoses     Hyperlipidemia LDL goal <100    -  1    Depression with anxiety        Ankylosing spondylitis of sacral region (H)        Intermittent asthma, with acute exacerbation          Care Instructions    Recommendations from today's MTM visit:                                                        1. Due for updated cholesterol labs    Next MTM visit: 8 weeks    To schedule another MTM appointment, please call the clinic directly or you may call the MTM scheduling line at [phone] or toll-free at [phone].     My Clinical Pharmacist's contact information:                                                      It was a pleasure seeing you today!  Please feel free to contact me with any questions or concerns you have.      Radha Mcdonald, Pharm.D, [facility]  Medication Therapy Management Pharmacist      You may receive a survey about the MTM services you received.  I would appreciate your feedback to help me serve you better in the future. Please fill it out and return it when you can. Your comments will be anonymous.                  Follow-ups after your visit        Your next 10 appointments already scheduled     Jul 05, 2018  2:00 PM CDT   [facility] with GARFIELD Stanley PT ([facility])    [address]  [facility] [address]   [phone]            Jul 12, 2018  1:20 PM CDT   MyChart Physical Therapy Spine Follow Up with GARFIELD Stanley PT (MADINA Cortez)    [address]  [facility] [address]   [phone]           If you have your physician's order in hand, please bring it with you to your appointment            Aug 21,  2018  1:00 PM CDT   Level 3 with [address] ([facility])    [address] [place] [id number] [phone]            Sep 19, 2018  2:40 PM CDT   Return Visit with Oneil Baxter MD   [facility] ([facility])    [address] [place] [address]  [place] [id number] [phone]              Future tests that were ordered for you today     Open Future Orders        Priority Expected Expires Ordered    Lipid panel reflex to direct LDL Fasting Routine 7/3/2018 6/26/2019 6/26/2018            Who to contact     If you have questions or need follow up information about today's clinic visit or your schedule please contact [facility] directly at [phone].  Normal or non-critical lab and imaging results will be communicated to you by MyChart, letter or phone within 4 business days after the clinic has received the results. If you do not hear from us within 7 days, please contact the clinic through Nvidiahart or phone. If you have a critical or abnormal lab result, we will notify you by phone as soon as possible.  Submit refill requests through Needish or call your pharmacy and they will forward the refill request to us. Please allow 3 business days for your refill to be completed.          Additional Information About Your Visit        MyChart Information     Needish gives you secure access to your electronic health record. If you see a primary care provider, you can also send messages to your care team and make appointments. If you have questions, please call your primary care clinic.  If you do not have a primary care provider, please call [phone] and they will assist you.        Care EveryWhere ID     This is your Care EveryWhere ID. This could be used by other organizations to access your [place] medical records  [id number]         Blood Pressure from Last 3 Encounters:   06/26/18 137/90   05/31/18 131/83    05/17/18 133/81    Weight from Last 3 Encounters:   06/26/18 (!) 344 lb 12.8 oz (156.4 kg)   05/31/18 (!) 340 lb 3.2 oz (154.3 kg)   05/17/18 (!) 333 lb 3.2 oz (151.1 kg)              Today, you had the following     No orders found for display       Primary Care Provider Office Phone # Fax #    Ksenia Shady Lyles [phone] [address] [facility] [address]        Goals        General    I will continue to attend Psychiatry appointments for medication management, 1/14/2014 (pt-stated)     Notes - Note edited  8/24/2016  4:04 PM by Dalila Cavazos LSW    As of today's date 8/24/2016 goal is met at 51 - 75%.   Goal Status:  Ongoing Sees  Therapist every other week and Psych PA every 3 weeks.  As of today's date 5/25/2016 goal is met at 51 - 75%.   Goal Status:  Showing progress-  Meeting with Psych PA for second time tomorrow  To review meds As of today's date 4/11/2016 goal is met at 51 - 75%.   Goal Status:  Showing progress  As of today's date 1/14/2014 goal is met at 51 - 75%.   Goal Status:  Active        I will schedule therapy with new counselor, 1/14/2014 (pt-stated)     Notes - Note edited  5/25/2016  1:54 PM by Dalila Cavazos LSW    As of today's date 5/25/2016 goal is met at 76 - 100%.   Goal Status:  Ongoing New therapist and Psych PA on a regular basis  As of today's date 5/10/2016 goal is met at 76 - 100%.   Goal Status:  Ongoing met with new therapist at [facility]  As of today's date 4/11/2016 goal is met at 51 - 75%.   Goal Status:  Ongoing meets with therapist regularly  As of today's date 1/14/2014 goal is met at 0 - 25%.   Goal Status:  Active        Psychosocial I need some help with cleaning (pt-stated)     Notes - Note edited  6/23/2016  1:57 PM by Dalila Cavazos LSW    As of today's date 6/23/2016 goal is met at 26 - 50%.   Goal Status:  Showing progress met with [facility]. Has not yet made a decision  As of today's date 5/25/2016 goal is met at  0 - 25%.   Goal Status:  Ongoing agency had to reschedule appt.  As of today's date 5/10/2016 goal is met at 0 - 25%.   Goal Status:  Active referral to [facility]        Equal Access to Services     ANNA CANO : Hadii floyd ashley orquideao Soisaac, waalexanderda luqadaha, qaybta kaalmada adejenny, isabell monson laCiromaureen echevarria. So [facility] [phone].    ATENCIÓN: Si habla español, tiene a faustin disposición servicios gratuitos de asistencia lingüística. Llame al [phone].    We comply with applicable federal civil rights laws and [place] laws. We do not discriminate on the basis of race, color, national origin, age, disability, sex, sexual orientation, or gender identity.            Thank you!     Thank you for choosing [facility]  for your care. Our goal is always to provide you with excellent care. Hearing back from our patients is one way we can continue to improve our services. Please take a few minutes to complete the written survey that you may receive in the mail after your visit with us. Thank you!             Your Updated Medication List - Protect others around you: Learn how to safely use, store and throw away your medicines at www.disposemymeds.org.          This list is accurate as of 6/26/18  4:30 PM.  Always use your most recent med list.                   Brand Name Dispense Instructions for use Diagnosis    acetaminophen 500 MG Caps     60 capsule    Take 500 mg by mouth every 4 hours as needed        * albuterol 108 (90 Base) MCG/ACT Inhaler    PROAIR HFA/PROVENTIL HFA/VENTOLIN HFA     Inhale 2 puffs into the lungs every 4 hours as needed        * albuterol (2.5 MG/3ML) 0.083% neb solution      Inhale 2.5 mg into the lungs        ALPRAZolam 1 MG 24 hr tablet    XANAX XR     Take 1 mg by mouth every morning        atorvastatin 40 MG tablet    LIPITOR    90 tablet    TAKE 1 TABLET (40 MG) BY MOUTH DAILY    Mixed hyperlipidemia       celecoxib 200 MG capsule    celeBREX    180  capsule    TAKE 1 CAPSULE BY MOUTH TWICE DAILY AS NEEDED FORMODERATE PAIN    Chronic midline low back pain without sciatica       cetirizine 10 MG tablet    zyrTEC    30 tablet    Take 1 tablet (10 mg) by mouth At Bedtime    Cough       cyanocobalamin 1000 MCG/ML injection    VITAMIN B12    10 mL    Inject 1 mL (1,000 mcg) into the muscle every 30 days    B12 deficiency       EPINEPHrine 0.3 MG/0.3ML injection 2-pack    EPIPEN/ADRENACLICK/or ANY BX GENERIC EQUIV    0.6 mL    Inject 0.3 mLs (0.3 mg) into the muscle once as needed for anaphylaxis    Food allergy       ginger root 550 MG Caps capsule      Take 550 mg by mouth Up to three times daily        hydrOXYzine 50 MG tablet    ATARAX     Take 50 mg by mouth daily        INFLIXIMAB IV           lamoTRIgine 25 MG tablet    LaMICtal     Take 200 mg by mouth daily        levothyroxine 75 MCG tablet    SYNTHROID/LEVOTHROID    90 tablet    Take 1 tablet (75 mcg) by mouth every morning    Acquired hypothyroidism       loperamide 2 MG capsule    IMODIUM     Take 2 mg by mouth 4 times daily as needed for diarrhea        metoprolol succinate 200 MG 24 hr tablet    TOPROL-XL    90 tablet    Take 1 tablet (200 mg) by mouth daily    Essential hypertension with goal blood pressure less than 140/90       montelukast 10 MG tablet    SINGULAIR     Take 10 mg by mouth        omega-3 acid ethyl esters 1 g capsule    LOVAZA    360 capsule    Take 2 capsules (2 g) by mouth 2 times daily    Hypertriglyceridemia       omeprazole 20 MG tablet      Take 20 mg by mouth daily        order for DME     12 each    Equipment being ordered: 1 ml tuberculin syringes to be used for Vitamin B12 injections.    Vitamin B12 deficiency (non anaemic)       order for DME     1 Units    SI-loc belt    SI (sacroiliac) joint dysfunction       order for DME      Respironics REMSTAR 60 Series Auto CPAP 10-12 cm H2O, Wisp nasal mask w/a large cushion and a chinstrap        oxyCODONE IR 5 MG tablet     ROXICODONE    35 tablet    Take 1-2 tablets (5-10 mg) by mouth every 6 hours as needed for pain (maximum 4 tablet(s) per day)    Facet arthropathy       pramipexole 0.5 MG tablet    MIRAPEX     Take 1/2 tab by mouth daily. Every 3 days, increase by 1/2 tab until taking a total of 4 tabs daily.        pregabalin 75 MG capsule    LYRICA    60 capsule    Take 1 capsule (75 mg) by mouth 2 times daily    Chronic midline low back pain without sciatica       ramipril 5 MG capsule    ALTACE    90 capsule    TAKE 1 CAPSULE BY MOUTH DAILY    Hypertension goal BP (blood pressure) < 140/90       risperiDONE 1 MG tablet    risperDAL     Take 1 mg by mouth daily        tiZANidine 4 MG tablet    ZANAFLEX    90 tablet    Take 1 tablet (4 mg) by mouth 3 times daily as needed for muscle spasms    Dorsalgia       vitamin D3 24205 UNITS capsule    CHOLECALCIFEROL    24 capsule    Take one capsule every two weeks.    Vitamin D deficiency       * Notice:  This list has 2 medication(s) that are the same as other medications prescribed for you. Read the directions carefully, and ask your doctor or other care provider to review them with you.

## 2018-06-26 NOTE — PATIENT INSTRUCTIONS
Recommendations from today's MTM visit:                                                        1. Due for updated cholesterol labs    Next MTM visit: 8 weeks    To schedule another MTM appointment, please call the clinic directly or you may call the MTM scheduling line at [phone] or toll-free at [phone].     My Clinical Pharmacist's contact information:                                                      It was a pleasure seeing you today!  Please feel free to contact me with any questions or concerns you have.      Radha Mcdonald, Pharm.D, [facility]  Medication Therapy Management Pharmacist      You may receive a survey about the MTM services you received.  I would appreciate your feedback to help me serve you better in the future. Please fill it out and return it when you can. Your comments will be anonymous.

## 2018-06-26 NOTE — PROGRESS NOTES
"Infusion Nursing Note:  Joel Pineda presents today for Remicade.    Patient seen by provider today: No   present during visit today: Not Applicable.    Note: Pt states he has been tolerating Remicade infusions, but has had some LUQ pain about 1-2 hours afterwards requiring use of pain medications to control.  The pain only last about a hour before going away.  Pt states he has already discussed the problems with his provider, MD suggested to take premeds with every infusion, will monitor PRN.  Pt denies any reason to hold Remicade infusion today.    Intravenous Access:  Peripheral IV placed.    Treatment Conditions:  Rheumatology Infusion Checklist: PRIOR TO INFUSION OF BIOLOGICAL MEDICATIONS OR ANY OF THESE AS LISTED: Remicaide (infliximab) \".rheumbiologicalchecklist\"    Prior to Infusion of biological medications or any of these as listed:    1. Elevated temperature, fever, chills, productive cough or abnormal vital signs, night sweats, coughing up blood or sputum, no appetite or abnormal vital signs : NO    2. Open wounds or new incisions: NO    3. Recent hospitalization: NO    4.  Recent surgeries:  NO    5. Any upcoming surgeries or dental procedures?:NO    6. Any current or recent bouts of illness or infection? On any antibiotics? : NO    7. Any new, sudden or worsening abdominal pain :NO    8. Vaccination within 4 weeks? Patient or someone in the household is scheduled to receive vaccination? No live virus vaccines prior to or during treatment :NO    9. Any nervous system diseases [i.e. multiple sclerosis, Guillain-[place]lam[place], seizures, neurological  changes]: NO    10. Pregnant or breast feeding; or plans on pregnancy in the future: NO    11. Signs of worsening depression or suicidal ideations while taking benlysta:NO    12. New-onset medical symptoms: NO    13.  New cancer diagnosis or on chemotherapy or radiation NO    14.  Evaluate for any sign of active TB [Unexplained weight loss, Loss of " appetite, Night sweats, Fever, Fatigue, Chills, Coughing for 3 weeks or longer, Hemoptysis (coughing up blood), Chest pain]: NO    **Note: If answered yes to any of the above, hold the infusion and contact ordering rheumatologist or on-call rheumatologist.   .      Post Infusion Assessment:  Patient tolerated infusion without incident.  Blood return noted pre and post infusion.  Site patent and intact, free from redness, edema or discomfort.  No evidence of extravasations.  Rheumatology Post Infusion: POST-INFUSION OF BIOLOGICAL MEDICATION:    Reviewed with patient.  Given biologic medication or medication hand-out. Inform patient if any fever, chills or signs of infection, new symptoms, abdominal pain, heart palpitations, shortness of breath, reaction, weakness, neurological changes, seek medical attention immediately and should not receive infusions. No live virus vaccines prior to or during treatment or up to 6 months post infusion. If the patient has an upcoming procedure or surgery, this should be discussed with the rheumatologist and surgeon or provider..    Discharge Plan:   Return in 8 weeks - pt instructed to make more appointments, verbalized understanding.  Discharge instructions reviewed with: Patient.  Patient and/or family verbalized understanding of discharge instructions and all questions answered.  Patient discharged in stable condition accompanied by: self.  Departure Mode: Ambulatory.    Diana Moy RN

## 2018-06-27 ENCOUNTER — TELEPHONE (OUTPATIENT)
Dept: RHEUMATOLOGY | Facility: CLINIC | Age: 43
End: 2018-06-27

## 2018-06-27 NOTE — TELEPHONE ENCOUNTER
Pt reports he ran temp of 99.6, involuntary body shakes, a slight HA, and nausea this morning. He took Tylenol and Vistaril which seemed to resolve his symptoms. Had him take his temperature while on the phone and it was 98.5. He feels like he is back to normal. He said he slept 3 hours last night due to the prednisone he took yesterday and believes this may have caused his symptoms. Advised pt to get some rest and that if this happens again to seek medical attention or call the clinic.    Neal Amanda RN....6/27/2018 1:56 PM

## 2018-06-27 NOTE — TELEPHONE ENCOUNTER
Reason for Call:  Other     Detailed comments: INFLIXIMAB IV dose was increased yesterday and last night patient had shakes, fever and only slept for 3 hours. Today he has a fever only,  And asking about the side effects, if this is an allergic reaction and should he go to the E/R?    Phone Number Patient can be reached at: Home number on file [phone] (home)    Best Time: any    Can we leave a detailed message on this number? YES    Call taken on 6/27/2018 at 12:30 PM by Ursula Ferreira

## 2018-06-27 NOTE — TELEPHONE ENCOUNTER
Left  for pt to return call to discuss symptoms, [phone].    Neal Amanda RN....6/27/2018 1:15 PM

## 2018-07-03 DIAGNOSIS — E78.5 HYPERLIPIDEMIA LDL GOAL <100: ICD-10-CM

## 2018-07-03 LAB
CHOLEST SERPL-MCNC: 167 MG/DL
HDLC SERPL-MCNC: 32 MG/DL
LDLC SERPL CALC-MCNC: ABNORMAL MG/DL
LDLC SERPL DIRECT ASSAY-MCNC: 84 MG/DL
NONHDLC SERPL-MCNC: 135 MG/DL
TRIGL SERPL-MCNC: 417 MG/DL

## 2018-07-03 PROCEDURE — 80061 LIPID PANEL: CPT | Performed by: FAMILY MEDICINE

## 2018-07-03 PROCEDURE — 83721 ASSAY OF BLOOD LIPOPROTEIN: CPT | Mod: 59 | Performed by: FAMILY MEDICINE

## 2018-07-03 PROCEDURE — 36415 COLL VENOUS BLD VENIPUNCTURE: CPT | Performed by: FAMILY MEDICINE

## 2018-07-06 ENCOUNTER — MYC REFILL (OUTPATIENT)
Dept: FAMILY MEDICINE | Facility: CLINIC | Age: 43
End: 2018-07-06

## 2018-07-06 DIAGNOSIS — G89.29 CHRONIC MIDLINE LOW BACK PAIN WITHOUT SCIATICA: ICD-10-CM

## 2018-07-06 DIAGNOSIS — M47.819 FACET ARTHROPATHY: ICD-10-CM

## 2018-07-06 DIAGNOSIS — M54.50 CHRONIC MIDLINE LOW BACK PAIN WITHOUT SCIATICA: ICD-10-CM

## 2018-07-06 NOTE — TELEPHONE ENCOUNTER
Message from MyChart:  Original authorizing provider: MD Tito Dickey SONIALopez Pineda would like a refill of the following medications:  pregabalin (LYRICA) 75 MG capsule [Ksenia Lyles MD]  oxyCODONE IR (ROXICODONE) 5 MG tablet [Ksenia Lyles MD]    Preferred pharmacy: [facility] [id number] [facility] [address] [address]    Comment:  New Rx taper request: Please reduce Lyrica to 50mg BID. Thanks.

## 2018-07-06 NOTE — TELEPHONE ENCOUNTER
Requested Prescriptions   Pending Prescriptions Disp Refills     pregabalin (LYRICA) 75 MG capsule      Last Written Prescription Date: 4/9/18  Last Fill Quantity: 60 capsule,   # refills: 5  Last Office Visit: Dr. Sampson  6/17/18  Future Office visit:    Next 5 appointments (look out 90 days)     Sep 19, 2018  2:40 PM CDT   Return Visit with Oneil Baxter MD   [facility] ([address] [address]   [phone]                   Routing refill request to provider for review/approval because:  Drug not on the [facility], [facility] or [facility] refill protocol or controlled substance 60 capsule 5     Sig: Take 1 capsule (75 mg) by mouth 2 times daily    There is no refill protocol information for this order                oxyCODONE IR (ROXICODONE) 5 MG tablet      Last Written Prescription Date:  6/4/18  Last Fill Quantity: 35 tablet,   # refills: 0  Last Office Visit: 6/17/18 Dr. Sampson  Future Office visit:    Next 5 appointments (look out 90 days)     Sep 19, 2018  2:40 PM CDT   Return Visit with Oneil Baxter MD   [facility] ([facility]    [address] [facility] [address]   [phone]                   Routing refill request to provider for review/approval because:  Drug not on the [facility], [facility] or [facility] refill protocol or controlled substance 35 tablet 0     Sig: Take 1-2 tablets (5-10 mg) by mouth every 6 hours as needed for pain (maximum 4 tablet(s) per day)    There is no refill protocol information for this order

## 2018-07-09 RX ORDER — PREGABALIN 75 MG/1
75 CAPSULE ORAL 2 TIMES DAILY
Qty: 60 CAPSULE | Refills: 5 | Status: SHIPPED | OUTPATIENT
Start: 2018-07-09 | End: 2018-07-10 | Stop reason: DRUGHIGH

## 2018-07-09 RX ORDER — OXYCODONE HYDROCHLORIDE 5 MG/1
5-10 TABLET ORAL EVERY 6 HOURS PRN
Qty: 35 TABLET | Refills: 0 | Status: SHIPPED | OUTPATIENT
Start: 2018-07-09 | End: 2018-08-19

## 2018-07-09 NOTE — TELEPHONE ENCOUNTER
Routing refill request to provider for review/approval because: Lyrica and oxycodone  Drug not on the [facility] refill protocol

## 2018-07-09 NOTE — TELEPHONE ENCOUNTER
please place rx at front for  and update patient when completed.      [place] Prescription Monitoring Program was reviewed and confirmed past prescriptions. No variances were noted.

## 2018-07-10 ENCOUNTER — MYC MEDICAL ADVICE (OUTPATIENT)
Dept: FAMILY MEDICINE | Facility: CLINIC | Age: 43
End: 2018-07-10

## 2018-07-10 DIAGNOSIS — G89.29 CHRONIC MIDLINE LOW BACK PAIN WITHOUT SCIATICA: ICD-10-CM

## 2018-07-10 DIAGNOSIS — M54.50 CHRONIC MIDLINE LOW BACK PAIN WITHOUT SCIATICA: ICD-10-CM

## 2018-07-10 RX ORDER — PREGABALIN 50 MG/1
50 CAPSULE ORAL 3 TIMES DAILY
Qty: 90 CAPSULE | Refills: 0 | Status: SHIPPED | OUTPATIENT
Start: 2018-07-10 | End: 2018-07-10

## 2018-07-10 RX ORDER — PREGABALIN 50 MG/1
50 CAPSULE ORAL 2 TIMES DAILY
Qty: 60 CAPSULE | Refills: 1 | Status: SHIPPED | OUTPATIENT
Start: 2018-07-10 | End: 2018-09-23

## 2018-07-10 NOTE — TELEPHONE ENCOUNTER
Please fax prescription for lyrica 50 mg twice a day and inform patient once completed.   Please disregard 50mg three times a day

## 2018-07-22 ENCOUNTER — MYC REFILL (OUTPATIENT)
Dept: FAMILY MEDICINE | Facility: CLINIC | Age: 43
End: 2018-07-22

## 2018-07-22 DIAGNOSIS — E78.2 MIXED HYPERLIPIDEMIA: ICD-10-CM

## 2018-07-22 DIAGNOSIS — M54.9 DORSALGIA: ICD-10-CM

## 2018-07-23 NOTE — TELEPHONE ENCOUNTER
"Requested Prescriptions   Pending Prescriptions Disp Refills     atorvastatin (LIPITOR) 40 MG tablet  Last Written Prescription Date:  1/24/18  Last Fill Quantity: 90 tablet,  # refills: 1   Last office visit: 5/17/2018 with prescribing provider:  Dr. Lyles   Future Office Visit:   Next 5 appointments (look out 90 days)     Sep 19, 2018  2:40 PM CDT   Return Visit with Oneil Baxter MD   [facility] ([address] [address]   [phone]                90 tablet 1     Sig: TAKE 1 TABLET (40 MG) BY MOUTH DAILY    Statins Protocol Passed    7/23/2018  6:29 AM       Passed - LDL on file in past 12 months    Recent Labs   Lab Test  07/03/18   0900   LDL  Cannot estimate LDL when triglyceride exceeds 400 mg/dL  84            Passed - No abnormal creatine kinase in past 12 months    Recent Labs   Lab Test  08/07/15   0846   CKT  110               Passed - Recent (12 mo) or future (30 days) visit within the authorizing provider's specialty    Patient had office visit in the last 12 months or has a visit in the next 30 days with authorizing provider or within the authorizing provider's specialty.  See \"Patient Info\" tab in inbasket, or \"Choose Columns\" in Meds & Orders section of the refill encounter.           Passed - Patient is age 18 or older          "

## 2018-07-23 NOTE — TELEPHONE ENCOUNTER
Message from MyChart:  Original authorizing provider: MD Tito Dickey would like a refill of the following medications:  tiZANidine (ZANAFLEX) 4 MG tablet [Ksenia Lyles MD]    Preferred pharmacy: [facility] # [id number] [facility] [address] [address]    Comment:

## 2018-07-23 NOTE — TELEPHONE ENCOUNTER
Message from MyChart:  Original authorizing provider: MD Tito Dickey would like a refill of the following medications:  atorvastatin (LIPITOR) 40 MG tablet [Ksenia Lyles MD]    Preferred pharmacy: [facility] # [id number] [facility] [address] [address]    Comment:

## 2018-07-23 NOTE — TELEPHONE ENCOUNTER
Requested Prescriptions   Pending Prescriptions Disp Refills     tiZANidine (ZANAFLEX) 4 MG tablet [Pharmacy Med Name: TiZANidine HCl Oral Tablet 4 MG]      Last Written Prescription Date:  5/30/17  Last Fill Quantity: 90 tablet,   # refills: 5  Last Office Visit: Dr. Lyles 2/27/18  Future Office visit:    Next 5 appointments (look out 90 days)     Sep 19, 2018  2:40 PM CDT   Return Visit with Oneil Baxter MD   [facility] ([facility]    [address] [facility]  [place] [address]   [phone]                   Routing refill request to provider for review/approval because:  Drug not on the [facility], [facility] or  Health refill protocol or controlled substance 90 tablet 4     Sig: TAKE 1 TABLET BY MOUTH THREE TIMES DAILY AS NEEDED    There is no refill protocol information for this order

## 2018-07-24 RX ORDER — ATORVASTATIN CALCIUM 40 MG/1
TABLET, FILM COATED ORAL
Qty: 90 TABLET | Refills: 1 | Status: SHIPPED | OUTPATIENT
Start: 2018-07-24 | End: 2018-12-31 | Stop reason: ALTCHOICE

## 2018-07-24 NOTE — TELEPHONE ENCOUNTER
Prescription approved per [facility] [place].    Dianne Henderson RN  [facility]

## 2018-07-30 DIAGNOSIS — E03.9 ACQUIRED HYPOTHYROIDISM: ICD-10-CM

## 2018-07-30 NOTE — TELEPHONE ENCOUNTER
"Requested Prescriptions   Pending Prescriptions Disp Refills     levothyroxine (SYNTHROID/LEVOTHROID) 75 MCG tablet [Pharmacy Med Name: Levothyroxine Sodium Oral Tablet 75 MCG]  Last Written Prescription Date:  01/24/18  Last Fill Quantity: 90 tablet,  # refills: 1   Last office visit: 5/17/2018 with prescribing provider: Dr. Lyles  Future Office Visit:   Next 5 appointments (look out 90 days)     Sep 19, 2018  2:40 PM CDT   Return Visit with Oneil Baxter MD   [facility] ([facility])    [address] [place] [address]   [phone]                90 tablet 0     Sig: TAKE 1 TABLET (75 MCG) BY MOUTH EVERY MORNING.    Thyroid Protocol Passed    7/30/2018 10:36 AM       Passed - Patient is 12 years or older       Passed - Recent (12 mo) or future (30 days) visit within the authorizing provider's specialty    Patient had office visit in the last 12 months or has a visit in the next 30 days with authorizing provider or within the authorizing provider's specialty.  See \"Patient Info\" tab in inbasket, or \"Choose Columns\" in Meds & Orders section of the refill encounter.           Passed - Normal TSH on file in past 12 months    Recent Labs   Lab Test  01/18/18   0834   TSH  1.84                "

## 2018-07-31 DIAGNOSIS — E78.1 HYPERTRIGLYCERIDEMIA: ICD-10-CM

## 2018-07-31 NOTE — TELEPHONE ENCOUNTER
"Requested Prescriptions   Pending Prescriptions Disp Refills     omega-3 acid ethyl esters (LOVAZA) 1 g capsule [Pharmacy Med Name: Omega-3-acid Ethyl Esters Oral Capsule 1 GM]  Last Written Prescription Date:  5/10/18  Last Fill Quantity: 360 tablet,  # refills: 0   Last office visit: 6/26/2018 with prescribing provider:  Dr. Lyles   Future Office Visit:   Next 5 appointments (look out 90 days)     Sep 19, 2018  2:40 PM CDT   Return Visit with Oneil Baxter MD   [facility] ([facility])    [address] [place] [address]   [phone]                360 capsule 0     Sig: TAKE 2 CAPSULES BY MOUTH TWICE DAILY.    Antihyperlipidemic agents Passed    7/31/2018  8:15 AM       Passed - Lipid panel on file in past 12 mos    Recent Labs   Lab Test  07/03/18   0900   12/03/14   0958   CHOL  167   < >  189   TRIG  417*   < >  487*   HDL  32*   < >  27*   LDL  Cannot estimate LDL when triglyceride exceeds 400 mg/dL  84   < >  Cannot estimate LDL when triglyceride exceeds 400 mg/dL  108   NHDL  135*   < >   --    VLDL   --    --   Cannot estimate VLDL when triglyceride exceeds 400 mg/dL.   CHOLHDLRATIO   --    --   7.0*    < > = values in this interval not displayed.              Passed - Normal serum ALT on record in past 12 mos    Recent Labs   Lab Test  05/17/18   1417   ALT  48            Passed - Recent (12 mo) or future (30 days) visit within the authorizing provider's specialty    Patient had office visit in the last 12 months or has a visit in the next 30 days with authorizing provider or within the authorizing provider's specialty.  See \"Patient Info\" tab in inbasket, or \"Choose Columns\" in Meds & Orders section of the refill encounter.           Passed - Patient is age 18 years or older          "

## 2018-08-02 RX ORDER — LEVOTHYROXINE SODIUM 75 UG/1
TABLET ORAL
Qty: 90 TABLET | Refills: 0 | Status: SHIPPED | OUTPATIENT
Start: 2018-08-02 | End: 2018-10-14

## 2018-08-02 RX ORDER — OMEGA-3-ACID ETHYL ESTERS 1 G/1
CAPSULE, LIQUID FILLED ORAL
Qty: 360 CAPSULE | Refills: 1 | Status: SHIPPED | OUTPATIENT
Start: 2018-08-02 | End: 2019-01-11

## 2018-08-02 NOTE — TELEPHONE ENCOUNTER
Prescription approved per [facility] Refill Protocol.      Martell Chaves RN, BSN

## 2018-08-02 NOTE — TELEPHONE ENCOUNTER
Prescription approved per [facility] Refill Protocol.    Martell Chaves RN, BSN

## 2018-08-05 ENCOUNTER — MYC REFILL (OUTPATIENT)
Dept: FAMILY MEDICINE | Facility: CLINIC | Age: 43
End: 2018-08-05

## 2018-08-05 DIAGNOSIS — Z91.018 FOOD ALLERGY: ICD-10-CM

## 2018-08-06 RX ORDER — EPINEPHRINE 0.3 MG/.3ML
0.3 INJECTION SUBCUTANEOUS
Qty: 0.6 ML | Refills: 3 | Status: SHIPPED | OUTPATIENT
Start: 2018-08-06 | End: 2019-07-26

## 2018-08-06 NOTE — TELEPHONE ENCOUNTER
Message from MyChart:  Original authorizing provider: MD Tito Dickey would like a refill of the following medications:  EPINEPHrine 0.3 MG/0.3ML injection [Ksenia Lyles MD]    Preferred pharmacy: [facility] [id number] [facility] [address] [address]    Comment:

## 2018-08-06 NOTE — TELEPHONE ENCOUNTER
Medication Detail      Disp Refills Start End ANNETTA   EPINEPHrine 0.3 MG/0.3ML injection 0.6 mL 3 12/9/2016  No   Sig - Route: Inject 0.3 mLs (0.3 mg) into the muscle once as needed for anaphylaxis - Intramuscular   Class: E-Prescribe   Order: [id number]   E-Prescribing Status: Receipt confirmed by pharmacy (12/9/2016  3:53 PM CST)       Routing refill request to provider for review/approval because:  A break in medication - Rx last filled in 2016.    Yuliana Solis RN

## 2018-08-06 NOTE — TELEPHONE ENCOUNTER
"Requested Prescriptions   Pending Prescriptions Disp Refills     EPINEPHrine (EPIPEN/ADRENACLICK/OR ANY BX GENERIC EQUIV) 0.3 MG/0.3ML injection 2-pack 0.6 mL 3     Sig: Inject 0.3 mLs (0.3 mg) into the muscle once as needed for anaphylaxis    Anaphylaxis Kits Protocol Passed    8/6/2018  6:30 AM       Passed - Recent (12 mo) or future (30 days) visit witin the authorizing provider's specialty    Patient had office visit in the last 12 months or has a visit in the next 30 days with authorizing provider or within the authorizing provider's specialty.  See \"Patient Info\" tab in inbasket, or \"Choose Columns\" in Meds & Orders section of the refill encounter.           Passed - Patient is age 5 or older        EPINEPHrine 0.3 MG/0.3ML injection  Last Written Prescription Date:  12/6/19  Last Fill Quantity: 6ml,  # refills: 3   Last office visit: 5/17/2018 with prescribing provider:  Dr. Lyles   Future Office Visit:   Next 5 appointments (look out 90 days)     Sep 19, 2018  2:40 PM CDT   Return Visit with Oneil Baxter MD   [facility] [place] ([facility] [place]    [address] [facility]  [place] [address] [phone]                   "

## 2018-08-15 ENCOUNTER — MEDICAL CORRESPONDENCE (OUTPATIENT)
Dept: HEALTH INFORMATION MANAGEMENT | Facility: CLINIC | Age: 43
End: 2018-08-15

## 2018-08-19 DIAGNOSIS — I10 HYPERTENSION GOAL BP (BLOOD PRESSURE) < 140/90: Chronic | ICD-10-CM

## 2018-08-20 ENCOUNTER — OFFICE VISIT (OUTPATIENT)
Dept: OPHTHALMOLOGY | Facility: CLINIC | Age: 43
End: 2018-08-20
Payer: COMMERCIAL

## 2018-08-20 DIAGNOSIS — H57.13 PAIN AROUND BOTH EYES: Primary | ICD-10-CM

## 2018-08-20 PROCEDURE — 92012 INTRM OPH EXAM EST PATIENT: CPT | Performed by: OPHTHALMOLOGY

## 2018-08-20 ASSESSMENT — TONOMETRY
OS_IOP_MMHG: 12
IOP_METHOD: TONOPEN
OD_IOP_MMHG: 13

## 2018-08-20 ASSESSMENT — REFRACTION_WEARINGRX
OS_AXIS: 109
OD_ADD: +1.25
SPECS_TYPE: SVL
OD_CYLINDER: +1.25
OD_AXIS: 044
OS_ADD: +1.25
OD_SPHERE: -3.50
OS_CYLINDER: +1.50
OS_SPHERE: -4.00

## 2018-08-20 ASSESSMENT — VISUAL ACUITY
OS_CC: 20/20
OD_CC: 20/20
CORRECTION_TYPE: GLASSES
METHOD: SNELLEN - LINEAR

## 2018-08-20 ASSESSMENT — EXTERNAL EXAM - RIGHT EYE: OD_EXAM: NORMAL

## 2018-08-20 ASSESSMENT — SLIT LAMP EXAM - LIDS
COMMENTS: NORMAL
COMMENTS: NORMAL

## 2018-08-20 ASSESSMENT — EXTERNAL EXAM - LEFT EYE: OS_EXAM: NORMAL

## 2018-08-20 ASSESSMENT — CONF VISUAL FIELD
OD_NORMAL: 1
OS_NORMAL: 1
METHOD: COUNTING FINGERS

## 2018-08-20 NOTE — PROGRESS NOTES
Assessment & Plan   Joel Pineda is a 43 year old male who presents with:   Review of systems for the eyes was negative other than the pertinent positives and negatives noted in the HPI.  History is obtained from the patient.    Chief Complaint   Patient presents with     Eye Pain Right Eye     no change in VA, intermittent     Headache     for the last 10 days, intermittent       Lab Results   Component Value Date    A1C 5.8 05/01/2018    A1C 5.8 01/18/2018    A1C 5.5 05/16/2017    A1C 5.5 09/01/2016    A1C 5.7 12/14/2015         Pain around both eyes  - No ocular etiology  - More likely Migraine and possibly sinus related  - Recommend Ibuprofen and Sudafed  - Reassured      Return if symptoms worsen or fail to improve.    Documentation for today's encounter was performed by Akiko Osborn COA. OSC. Acting as a scribe in my presence. I have reviewed and verified that it is an accurate recording of today's encounter.    Attending Physician Attestation:  Complete documentation of historical and exam elements from today's encounter can be found in the full encounter summary report (not reduplicated in this progress note).  I personally obtained the chief complaint(s) and history of present illness.  I confirmed and edited as necessary the review of systems, past medical/surgical history, family history, social history, and examination findings as documented by others; and I examined the patient myself.  I personally reviewed the relevant tests, images, and reports as documented above.  I formulated and edited as necessary the assessment and plan and discussed the findings and management plan with the patient and family. - Ramakrishna Hernandez MD

## 2018-08-20 NOTE — MR AVS SNAPSHOT
After Visit Summary   8/20/2018    Joel Pineda    MRN: [id number]           Patient Information     Date Of Birth          1975        Visit Information        Provider Department      8/20/2018 3:30 PM Ramakrishna Hernandez MD [facility]        Today's Diagnoses     Pain around both eyes    -  1       Follow-ups after your visit        Follow-up notes from your care team     Return if symptoms worsen or fail to improve.      Your next 10 appointments already scheduled     Aug 21, 2018  8:30 AM CDT   [facility]    [address] [facility] [address]   [phone]            Aug 21, 2018  1:00 PM CDT   Level 3 with [address] ([facility])    [address] [address] [place] [id number] [phone]            Sep 19, 2018  2:40 PM CDT   Return Visit with Oneil Baxter MD   [facility] ([facility])    [address] [place] [id number] [phone]              Who to contact     If you have questions or need follow up information about today's clinic visit or your schedule please contact [facility] directly at [phone].  Normal or non-critical lab and imaging results will be communicated to you by MyChart, letter or phone within 4 business days after the clinic has received the results. If you do not hear from us within 7 days, please contact the clinic through MyChart or phone. If you have a critical or abnormal lab result, we will notify you by phone as soon as possible.  Submit refill requests through [employer] or call your pharmacy and they will forward the refill request to us. Please allow 3 business days for your refill to be completed.          Additional Information About Your Visit        Uniqueduhart Information     [employer] gives you secure  access to your electronic health record. If you see a primary care provider, you can also send messages to your care team and make appointments. If you have questions, please call your primary care clinic.  If you do not have a primary care provider, please call [phone] and they will assist you.      [employer] is an electronic gateway that provides easy, online access to your medical records. With [employer], you can request a clinic appointment, read your test results, renew a prescription or communicate with your care team.     To access your existing account, please contact your [facility] or call [phone] for assistance.        Care EveryWhere ID     This is your Care EveryWhere ID. This could be used by other organizations to access your [facility] medical records  [id number]         Blood Pressure from Last 3 Encounters:   06/26/18 137/90   05/31/18 131/83   05/17/18 133/81    Weight from Last 3 Encounters:   06/26/18 (!) 156.4 kg (344 lb 12.8 oz)   05/31/18 (!) 154.3 kg (340 lb 3.2 oz)   05/17/18 (!) 151.1 kg (333 lb 3.2 oz)              We Performed the Following     EYE EXAM ESTABLISHED PT        Primary Care Provider Office Phone # Fax #    Ksenia Shady Lyles [phone] [address] [facility] [address]        Goals        General    I will continue to attend Psychiatry appointments for medication management, 1/14/2014 (pt-stated)     Notes - Note edited  8/24/2016  4:04 PM by Dalila Cavazos LSW    As of today's date 8/24/2016 goal is met at 51 - 75%.   Goal Status:  Ongoing Sees  Therapist every other week and Psych PA every 3 weeks.  As of today's date 5/25/2016 goal is met at 51 - 75%.   Goal Status:  Showing progress-  Meeting with Psych PA for second time tomorrow  To review meds As of today's date 4/11/2016 goal is met at 51 - 75%.   Goal Status:  Showing progress  As of today's date 1/14/2014 goal is met at 51 - 75%.    Goal Status:  Active        I will schedule therapy with new counselor, 1/14/2014 (pt-stated)     Notes - Note edited  5/25/2016  1:54 PM by Dalila Cavazos LSW    As of today's date 5/25/2016 goal is met at 76 - 100%.   Goal Status:  Ongoing New therapist and Psych PA on a regular basis  As of today's date 5/10/2016 goal is met at 76 - 100%.   Goal Status:  Ongoing met with new therapist at [facility]  As of today's date 4/11/2016 goal is met at 51 - 75%.   Goal Status:  Ongoing meets with therapist regularly  As of today's date 1/14/2014 goal is met at 0 - 25%.   Goal Status:  Active        Psychosocial I need some help with cleaning (pt-stated)     Notes - Note edited  6/23/2016  1:57 PM by Dalila Cavazos LSW    As of today's date 6/23/2016 goal is met at 26 - 50%.   Goal Status:  Showing progress met with [facility]. Has not yet made a decision  As of today's date 5/25/2016 goal is met at 0 - 25%.   Goal Status:  Ongoing agency had to reschedule appt.  As of today's date 5/10/2016 goal is met at 0 - 25%.   Goal Status:  Active referral to [facility]        Equal Access to Services     ANNA CANO AH: Hadii floyd ashley hadasho Soomaali, waaxda luqadaha, qaybta kaalmada adeegyada, waxay melissa echevarria. So [facility] [phone].    ATENCIÓN: Si habla español, tiene a faustin disposición servicios gratuitos de asistencia lingüística. Llame al [phone].    We comply with applicable federal civil rights laws and [place] laws. We do not discriminate on the basis of race, color, national origin, age, disability, sex, sexual orientation, or gender identity.            Thank you!     Thank you for choosing [facility]  for your care. Our goal is always to provide you with excellent care. Hearing back from our patients is one way we can continue to improve our services. Please take a few minutes to complete the written survey that you may receive in the mail after your visit with us. Thank  you!             Your Updated Medication List - Protect others around you: Learn how to safely use, store and throw away your medicines at www.disposemymeds.org.          This list is accurate as of 8/20/18 11:59 PM.  Always use your most recent med list.                   Brand Name Dispense Instructions for use Diagnosis    acetaminophen 500 MG Caps     60 capsule    Take 500 mg by mouth every 4 hours as needed        * albuterol 108 (90 Base) MCG/ACT inhaler    PROAIR HFA/PROVENTIL HFA/VENTOLIN HFA     Inhale 2 puffs into the lungs every 4 hours as needed        * albuterol (2.5 MG/3ML) 0.083% neb solution      Inhale 2.5 mg into the lungs        ALPRAZolam 1 MG 24 hr tablet    XANAX XR     Take 1 mg by mouth every morning        atorvastatin 40 MG tablet    LIPITOR    90 tablet    TAKE 1 TABLET (40 MG) BY MOUTH DAILY    Mixed hyperlipidemia       celecoxib 200 MG capsule    celeBREX    180 capsule    TAKE 1 CAPSULE BY MOUTH TWICE DAILY AS NEEDED FORMODERATE PAIN    Chronic midline low back pain without sciatica       cetirizine 10 MG tablet    zyrTEC    30 tablet    Take 1 tablet (10 mg) by mouth At Bedtime    Cough       cyanocobalamin 1000 MCG/ML injection    VITAMIN B12    10 mL    Inject 1 mL (1,000 mcg) into the muscle every 30 days    B12 deficiency       EPINEPHrine 0.3 MG/0.3ML injection 2-pack    EPIPEN/ADRENACLICK/or ANY BX GENERIC EQUIV    0.6 mL    Inject 0.3 mLs (0.3 mg) into the muscle once as needed for anaphylaxis    Food allergy       ginger root 550 MG Caps capsule      Take 550 mg by mouth Up to three times daily        hydrOXYzine 50 MG tablet    ATARAX     Take 50 mg by mouth daily        INFLIXIMAB IV           lamoTRIgine 25 MG tablet    LaMICtal     Take 200 mg by mouth daily        levothyroxine 75 MCG tablet    SYNTHROID/LEVOTHROID    90 tablet    TAKE 1 TABLET (75 MCG) BY MOUTH EVERY MORNING.    Acquired hypothyroidism       loperamide 2 MG capsule    IMODIUM     Take 2 mg by mouth 4  times daily as needed for diarrhea        metoprolol succinate 200 MG 24 hr tablet    TOPROL-XL    90 tablet    Take 1 tablet (200 mg) by mouth daily    Essential hypertension with goal blood pressure less than 140/90       montelukast 10 MG tablet    SINGULAIR     Take 10 mg by mouth        omega-3 acid ethyl esters 1 g capsule    Lovaza    360 capsule    TAKE 2 CAPSULES BY MOUTH TWICE DAILY.    Hypertriglyceridemia       omeprazole 20 MG tablet      Take 20 mg by mouth daily        order for DME     12 each    Equipment being ordered: 1 ml tuberculin syringes to be used for Vitamin B12 injections.    Vitamin B12 deficiency (non anaemic)       order for DME     1 Units    SI-loc belt    SI (sacroiliac) joint dysfunction       order for DME      Respironics REMSTAR 60 Series Auto CPAP 10-12 cm H2O, Wisp nasal mask w/a large cushion and a chinstrap        oxyCODONE IR 5 MG tablet    ROXICODONE    35 tablet    Take 1-2 tablets (5-10 mg) by mouth every 6 hours as needed for pain (maximum 4 tablet(s) per day)    Facet arthropathy       pramipexole 0.5 MG tablet    MIRAPEX     Take 1/2 tab by mouth daily. Every 3 days, increase by 1/2 tab until taking a total of 4 tabs daily.        pregabalin 50 MG capsule    LYRICA    60 capsule    Take 1 capsule (50 mg) by mouth 2 times daily    Chronic midline low back pain without sciatica       ramipril 5 MG capsule    ALTACE    90 capsule    TAKE 1 CAPSULE BY MOUTH DAILY    Hypertension goal BP (blood pressure) < 140/90       risperiDONE 1 MG tablet    risperDAL     Take 1 mg by mouth daily        tiZANidine 4 MG tablet    ZANAFLEX    90 tablet    TAKE 1 TABLET BY MOUTH THREE TIMES DAILY AS NEEDED    Dorsalgia       vitamin D3 35862 UNITS capsule    CHOLECALCIFEROL    24 capsule    Take one capsule every two weeks.    Vitamin D deficiency       * Notice:  This list has 2 medication(s) that are the same as other medications prescribed for you. Read the directions carefully, and ask  your doctor or other care provider to review them with you.

## 2018-08-20 NOTE — NURSING NOTE
Patient presents with:  Eye Pain Right Eye: no change in VA, intermittent  Headache: for the last 10 days, intermittent      Referring Provider:  Referred Self, MD  No address on file    HPI    Last Eye Exam:  4/14/17   Affected eye(s):  Right   Symptoms:        Duration:  10 days   Frequency:  Intermittent       Do you have eye pain now?:  No      Comments:     Eye Pain Right Eye: no change in VA, intermittent  Headache: for the last 10 days, intermittent                 Akiko NICOLAS. OSC

## 2018-08-20 NOTE — TELEPHONE ENCOUNTER
"Requested Prescriptions   Pending Prescriptions Disp Refills     ramipril (ALTACE) 5 MG capsule [Pharmacy Med Name: Ramipril Oral Capsule 5 MG] 90 capsule 0     Sig: TAKE 1 CAPSULE BY MOUTH DAILY    ACE Inhibitors (Including Combos) Protocol Failed    8/19/2018  9:15 PM       Failed - Blood pressure under 140/90 in past 12 months    BP Readings from Last 3 Encounters:   06/26/18 137/90   05/31/18 131/83   05/17/18 133/81                Passed - Recent (12 mo) or future (30 days) visit within the authorizing provider's specialty    Patient had office visit in the last 12 months or has a visit in the next 30 days with authorizing provider or within the authorizing provider's specialty.  See \"Patient Info\" tab in inbasket, or \"Choose Columns\" in Meds & Orders section of the refill encounter.           Passed - Patient is age 18 or older       Passed - Normal serum creatinine on file in past 12 months    Recent Labs   Lab Test  04/19/18   1116   CR  0.97            Passed - Normal serum potassium on file in past 12 months    Recent Labs   Lab Test  04/19/18   1116   POTASSIUM  3.9           ramipril (ALTACE) 5 MG capsule  Last Written Prescription Date:  1/24/18  Last Fill Quantity: 90,  # refills: 1   Last office visit: 5/17/2018 with prescribing provider:  Dr. Lyles   Future Office Visit:   Next 5 appointments (look out 90 days)     Aug 20, 2018  3:30 PM CDT   Return Visit with Ramakrishna Hernandez MD   [facility] [place] ([facility] [place])    [address] [place] [id number] [phone]            Sep 19, 2018  2:40 PM CDT   Return Visit with Oneil Baxter MD   [facility] ([facility]    [address] [place], [place] [address] [id number] [phone]                   "

## 2018-08-21 ENCOUNTER — INFUSION THERAPY VISIT (OUTPATIENT)
Dept: INFUSION THERAPY | Facility: CLINIC | Age: 43
End: 2018-08-21
Payer: COMMERCIAL

## 2018-08-21 ENCOUNTER — OFFICE VISIT (OUTPATIENT)
Dept: OTOLARYNGOLOGY | Facility: CLINIC | Age: 43
End: 2018-08-21
Payer: COMMERCIAL

## 2018-08-21 VITALS
WEIGHT: 315 LBS | OXYGEN SATURATION: 96 % | RESPIRATION RATE: 16 BRPM | DIASTOLIC BLOOD PRESSURE: 90 MMHG | HEIGHT: 77 IN | SYSTOLIC BLOOD PRESSURE: 148 MMHG | BODY MASS INDEX: 37.19 KG/M2 | HEART RATE: 104 BPM

## 2018-08-21 VITALS
SYSTOLIC BLOOD PRESSURE: 124 MMHG | RESPIRATION RATE: 18 BRPM | DIASTOLIC BLOOD PRESSURE: 83 MMHG | BODY MASS INDEX: 40.37 KG/M2 | TEMPERATURE: 98.4 F | WEIGHT: 315 LBS | HEART RATE: 73 BPM

## 2018-08-21 DIAGNOSIS — G50.1 FACIAL PAIN, ATYPICAL: Primary | ICD-10-CM

## 2018-08-21 DIAGNOSIS — M45.8 ANKYLOSING SPONDYLITIS OF SACRAL REGION (H): Primary | ICD-10-CM

## 2018-08-21 PROCEDURE — 99207 ZZC NO CHARGE NURSE ONLY: CPT

## 2018-08-21 PROCEDURE — 99214 OFFICE O/P EST MOD 30 MIN: CPT | Performed by: OTOLARYNGOLOGY

## 2018-08-21 PROCEDURE — 96375 TX/PRO/DX INJ NEW DRUG ADDON: CPT | Performed by: INTERNAL MEDICINE

## 2018-08-21 PROCEDURE — 96415 CHEMO IV INFUSION ADDL HR: CPT | Performed by: INTERNAL MEDICINE

## 2018-08-21 PROCEDURE — 96413 CHEMO IV INFUSION 1 HR: CPT | Performed by: INTERNAL MEDICINE

## 2018-08-21 RX ORDER — METHYLPREDNISOLONE SODIUM SUCCINATE 125 MG/2ML
125 INJECTION, POWDER, LYOPHILIZED, FOR SOLUTION INTRAMUSCULAR; INTRAVENOUS ONCE
Status: CANCELLED | OUTPATIENT
Start: 2018-08-21 | End: 2018-08-21

## 2018-08-21 RX ORDER — DIPHENHYDRAMINE HCL 25 MG
25 CAPSULE ORAL ONCE
Status: COMPLETED | OUTPATIENT
Start: 2018-08-21 | End: 2018-08-21

## 2018-08-21 RX ORDER — RAMIPRIL 5 MG/1
CAPSULE ORAL
Qty: 90 CAPSULE | Refills: 0 | Status: SHIPPED | OUTPATIENT
Start: 2018-08-21 | End: 2018-08-28

## 2018-08-21 RX ORDER — DIPHENHYDRAMINE HCL 25 MG
25 CAPSULE ORAL ONCE
Status: CANCELLED
Start: 2018-08-21 | End: 2018-08-21

## 2018-08-21 RX ORDER — METHYLPREDNISOLONE SODIUM SUCCINATE 125 MG/2ML
125 INJECTION, POWDER, LYOPHILIZED, FOR SOLUTION INTRAMUSCULAR; INTRAVENOUS ONCE
Status: COMPLETED | OUTPATIENT
Start: 2018-08-21 | End: 2018-08-21

## 2018-08-21 RX ORDER — ACETAMINOPHEN 325 MG/1
650 TABLET ORAL ONCE
Status: CANCELLED
Start: 2018-08-21 | End: 2018-08-21

## 2018-08-21 RX ORDER — ACETAMINOPHEN 325 MG/1
650 TABLET ORAL ONCE
Status: COMPLETED | OUTPATIENT
Start: 2018-08-21 | End: 2018-08-21

## 2018-08-21 RX ADMIN — ACETAMINOPHEN 650 MG: 325 TABLET ORAL at 13:13

## 2018-08-21 RX ADMIN — METHYLPREDNISOLONE SODIUM SUCCINATE 125 MG: 125 INJECTION INTRAMUSCULAR; INTRAVENOUS at 13:47

## 2018-08-21 RX ADMIN — Medication 250 ML: at 13:30

## 2018-08-21 RX ADMIN — Medication 25 MG: at 13:14

## 2018-08-21 ASSESSMENT — PAIN SCALES - GENERAL: PAINLEVEL: NO PAIN (0)

## 2018-08-21 NOTE — TELEPHONE ENCOUNTER
Routing refill request to provider for review/approval because:  BP is out of range.    Martell Chaves RN, BSN

## 2018-08-21 NOTE — PROGRESS NOTES
History of Present Illness - Joel Pineda is a 43 year old male here to see me for the first time for possible sinus issues.    He had an ethmoidectomy in 2002, and rhinoplasty in 2010.  Since those provcedures he has gotten a lot better since then, only an ingfection once per year.  But then about 2 weeks ago he started to get pain around the RIGHT orbit and forehead, but no sinusitis symptoms or prodromal URI.      He did have a dental cleaning last week, but no other acute dental issues.  He does use a CPAP mask.  No change in environment.    He has previously seen Dr. Sun with [facility] for ear pain, but it was worked up and no eustachian tube dysfunction or ear disease was found, and was attributed to temporomandibular disease.    Past Medical History -   Patient Active Problem List   Diagnosis     Major depressive disorder, recurrent episode (H)     Intermittent asthma     Hyperlipidemia LDL goal <130     Hypertension goal BP (blood pressure) < 140/90     Chronic nonallergic rhinitis     History of diverticulitis of colon     Obesity (BMI 30-39.9)     Health Care Home     PE (pulmonary embolism)     Diverticulosis     GERD (gastroesophageal reflux disease)     Anxiety     LLOYD (obstructive sleep apnea)- mild (AHI 11)     Intracranial arachnoid cyst     Facet arthritis of cervical region (H)     Acquired hypothyroidism     Bipolar 2 disorder (H)     Allergic rhinitis, unspecified allergic rhinitis type     Chronic midline low back pain without sciatica     Irritable bowel syndrome with diarrhea     B12 deficiency     Impaired glucose tolerance     Essential hypertension with goal blood pressure less than 140/90     Psychophysiological insomnia     Chronic pain syndrome     Ankylosing spondylitis of sacral region (H)     Morbid obesity due to hypertriglyceridemia (H)     Fatty infiltration of liver     Ankylosing spondylitis lumbar region (H)     Sacroiliac joint dysfunction       Current Medications -   Current  Outpatient Prescriptions:      acetaminophen 500 MG CAPS, Take 500 mg by mouth every 4 hours as needed, Disp: 60 capsule, Rfl:      albuterol (2.5 MG/3ML) 0.083% neb solution, Inhale 2.5 mg into the lungs, Disp: , Rfl:      albuterol (PROAIR HFA/PROVENTIL HFA/VENTOLIN HFA) 108 (90 BASE) MCG/ACT Inhaler, Inhale 2 puffs into the lungs every 4 hours as needed, Disp: , Rfl:      ALPRAZolam (XANAX XR) 1 MG 24 hr tablet, Take 1 mg by mouth every morning, Disp: , Rfl:      atorvastatin (LIPITOR) 40 MG tablet, TAKE 1 TABLET (40 MG) BY MOUTH DAILY, Disp: 90 tablet, Rfl: 1     celecoxib (CELEBREX) 200 MG capsule, TAKE 1 CAPSULE BY MOUTH TWICE DAILY AS NEEDED FORMODERATE PAIN, Disp: 180 capsule, Rfl: 1     cetirizine (ZYRTEC) 10 MG tablet, Take 1 tablet (10 mg) by mouth At Bedtime, Disp: 30 tablet, Rfl: 11     cyanocobalamin (VITAMIN B12) 1000 MCG/ML injection, Inject 1 mL (1,000 mcg) into the muscle every 30 days, Disp: 10 mL, Rfl: 1     EPINEPHrine (EPIPEN/ADRENACLICK/OR ANY BX GENERIC EQUIV) 0.3 MG/0.3ML injection 2-pack, Inject 0.3 mLs (0.3 mg) into the muscle once as needed for anaphylaxis, Disp: 0.6 mL, Rfl: 3     Ginger, Zingiber officinalis, (GINGER ROOT) 550 MG CAPS capsule, Take 550 mg by mouth Up to three times daily, Disp: , Rfl:      hydrOXYzine (ATARAX) 50 MG tablet, Take 50 mg by mouth daily, Disp: , Rfl:      INFLIXIMAB IV, , Disp: , Rfl:      lamoTRIgine (LAMICTAL) 25 MG tablet, Take 200 mg by mouth daily , Disp: , Rfl:      levothyroxine (SYNTHROID/LEVOTHROID) 75 MCG tablet, TAKE 1 TABLET (75 MCG) BY MOUTH EVERY MORNING., Disp: 90 tablet, Rfl: 0     loperamide (IMODIUM) 2 MG capsule, Take 2 mg by mouth 4 times daily as needed for diarrhea, Disp: , Rfl:      metoprolol succinate (TOPROL-XL) 200 MG 24 hr tablet, Take 1 tablet (200 mg) by mouth daily, Disp: 90 tablet, Rfl: 1     montelukast (SINGULAIR) 10 MG tablet, Take 10 mg by mouth, Disp: , Rfl:      omega-3 acid ethyl esters (LOVAZA) 1 g capsule, TAKE 2  CAPSULES BY MOUTH TWICE DAILY., Disp: 360 capsule, Rfl: 1     omeprazole 20 MG tablet, Take 20 mg by mouth daily , Disp: , Rfl:      order for DME, Respironics REMSTAR 60 Series Auto CPAP 10-12 cm H2O, Wisp nasal mask w/a large cushion and a chinstrap, Disp: , Rfl:      order for DME, SI-loc belt, Disp: 1 Units, Rfl: 0     order for DME, Equipment being ordered: 1 ml tuberculin syringes to be used for Vitamin B12 injections., Disp: 12 each, Rfl: 1     oxyCODONE IR (ROXICODONE) 5 MG tablet, Take 1-2 tablets (5-10 mg) by mouth every 6 hours as needed for pain (maximum 4 tablet(s) per day), Disp: 35 tablet, Rfl: 0     pramipexole (MIRAPEX) 0.5 MG tablet, Take 1/2 tab by mouth daily. Every 3 days, increase by 1/2 tab until taking a total of 4 tabs daily., Disp: , Rfl:      pregabalin (LYRICA) 50 MG capsule, Take 1 capsule (50 mg) by mouth 2 times daily, Disp: 60 capsule, Rfl: 1     ramipril (ALTACE) 5 MG capsule, TAKE 1 CAPSULE BY MOUTH DAILY, Disp: 90 capsule, Rfl: 1     risperiDONE (RISPERDAL) 1 MG tablet, Take 1 mg by mouth daily, Disp: , Rfl:      tiZANidine (ZANAFLEX) 4 MG tablet, TAKE 1 TABLET BY MOUTH THREE TIMES DAILY AS NEEDED, Disp: 90 tablet, Rfl: 4     vitamin D3 (CHOLECALCIFEROL) 30491 UNITS capsule, Take one capsule every two weeks., Disp: 24 capsule, Rfl: 1    Allergies -   Allergies   Allergen Reactions     Banana Shortness Of Breath     Pt reports organic Banana is okay.      Nitroglycerin Palpitations     Penicillins Anaphylaxis     Provigil [Modafinil] Shortness Of Breath     headache     Ciprofloxacin Palpitations, Other (See Comments) and Rash     dizziness (versus metronidazole taken at same time)     Gadolinium Hives and Itching     Patient was premedicated for the contrast allergy. He did still have a reaction a few hours after injection. Hives and itching. Dr. Gomez told tech to inform pt he should only have contrast again in the future when premedicated and at a hospital. Not at an  outpatient facility.      Dulera Other (See Comments)     Hoarse voice     Dye [Contrast Dye] Other (See Comments) and Hives     Moderate flushing, CT contrast     Levaquin Other (See Comments)     tendonitis     Levofloxacin      Tendonitis  Tendonitis     Neurontin [Gabapentin] Hives     Moderate hives     Nortriptyline Hives     Varicella Virus Vaccine Live      Rash     Ciprofloxacin Palpitations     Flagyl [Metronidazole Hcl] Palpitations and Hives     Latex Rash     Metronidazole Palpitations, Other (See Comments) and Rash     dizziness (versus ciprofloxacin taken at same time)     No Clinical Screening - See Comments Rash     Nitrile gloves       Social History -   Social History     Social History     Marital status: Single     Spouse name: N/A     Number of children: N/A     Years of education: N/A     Occupational History      [employer]     paraprofessional      [employer]      Disability     Social History Main Topics     Smoking status: Never Smoker     Smokeless tobacco: Never Used     Alcohol use No     Drug use: No     Sexual activity: No     Other Topics Concern     Parent/Sibling W/ Cabg, Mi Or Angioplasty Before 65f 55m? No     Sleep Concern Yes     Stress Concern Yes     Back Care Yes     Seat Belt Yes     Social History Narrative       Family History -   Family History   Problem Relation Age of Onset     Musculoskeletal Disorder Mother      back     Anxiety Disorder Mother      Colon Polyps Mother      Ulcerative Colitis Mother      and ischemic small intestine, surgery     Hypertension Mother      Breast Cancer Mother      Osteoperosis Mother      Diabetes Mother      Type 2, Diagnosed in 2014     Depression Mother      Takes Cymbalta to help with chronic pain + depx     Thyroid Disease Mother      Hypothyroidism     Obesity Mother      Under much better control latter half of 2015     Musculoskeletal Disorder Father      back     Substance Abuse Father      Hypertension  "Father      Hyperlipidemia Father      Depression Father      Off meds for many years. Seems \"ok\"     HEART DISEASE Maternal Grandmother      HEART DISEASE Maternal Grandfather      Psychotic Disorder Paternal Grandfather      Suicide Paternal Grandfather      Depression Paternal Grandfather      Pediatrician. Committed suicide by pistol in 1990.     Musculoskeletal Disorder Brother      back     Depression Brother      Expressed as anger and moodiness     Substance Abuse Sister      Depression Sister      Mental Health Therapist, yet no anti-depressants?     Anxiety Disorder Sister      Mental Health Therapist, yet no anti-anxiety meds?     Other Cancer Other      Bladder Cancer - Fatal     Substance Abuse Brother      Colon Cancer No family hx of      Crohn Disease No family hx of      Anesthesia Reaction No family hx of      Cancer No family hx of      No family history of skin cancer       Review of Systems - As per HPI and PMHx, otherwise 10+ system review of the head and neck, and general constitution is negative.    Physical Exam  /90  Pulse 104  Resp 16  Ht 1.956 m (6' 5\")  Wt (!) 156.5 kg (345 lb)  SpO2 96%  BMI 40.91 kg/m2    General - The patient is well nourished and well developed, and appears to have good nutritional status.  Alert and oriented to person and place, answers questions and cooperates with examination appropriately.   Head and Face - Normocephalic and atraumatic, with no gross asymmetry noted of the contour of the facial features.  The facial nerve is intact, with strong symmetric movements.  Voice and Breathing - The patient was breathing comfortably without the use of accessory muscles. There was no wheezing, stridor, or stertor.  The patients voice was clear and strong, and had appropriate pitch and quality.  Ears - The tympanic membranes are normal in appearance, bony landmarks are intact.  No retraction, perforation, or masses.  No fluid or purulence was seen in the " external canal or the middle ear. No evidence of infection of the middle ear or external canal, cerumen was normal in appearance.  Eyes - Extraocular movements intact, and the pupils were reactive to light.  Sclera were not icteric or injected, conjunctiva were pink and moist.  Mouth - Examination of the oral cavity showed pink, healthy oral mucosa. No lesions or ulcerations noted.  The tongue was mobile and midline, and the dentition were in good condition.    Throat - The walls of the oropharynx were smooth, pink, moist, symmetric, and had no lesions or ulcerations.  The tonsillar pillars and soft palate were symmetric.  The uvula was midline on elevation.    Neck - Normal midline excursion of the laryngotracheal complex during swallowing.  Full range of motion on passive movement.  Palpation of the occipital, submental, submandibular, internal jugular chain, and supraclavicular nodes did not demonstrate any abnormal lymph nodes or masses.  The carotid pulse was palpable bilaterally.  Palpation of the thyroid was soft and smooth, with no nodules or goiter appreciated.  The trachea was mobile and midline.  Nose - External contour is symmetric, no gross deflection or scars.  Nasal mucosa is pink and moist with no abnormal mucus.  The septum was midline and non-obstructive, turbinates of normal size and position.  No polyps, masses, or purulence noted on examination.      A/P - Joel Pineda is a 43 year old male  (G50.1) Facial pain, atypical  (primary encounter diagnosis)    This presentation of unilateral RIGHT periorbital pain is not typical for sinusitis, but given his history, I will get a CT scan of the sinuses to make sure.    I will call him with the results and plan.

## 2018-08-21 NOTE — MR AVS SNAPSHOT
After Visit Summary   8/21/2018    Joel Pineda    MRN: [id number]           Patient Information     Date Of Birth          1975        Visit Information        Provider Department      8/21/2018 8:30 AM Chico Jimenez MD [facility]        Today's Diagnoses     Facial pain, atypical    -  1      Care Instructions    Scheduling Information  To schedule your CT/MRI scan, please contact [facility] at [phone] OR [facility] at [phone]    To schedule your Surgery, please contact our Specialty Schedulers at [phone]      ENT Clinic Locations Clinic Hours Telephone Number     [facility] [facility]  [address] [facility] [place] [address]   Monday:           1:00pm -- 5:00pm    Friday:              8:00am - 12:00pm   To schedule/reschedule an appointment with   Dr. Jimenez,   please contact our   Specialty Scheduling Department at:     [phone]       [facility]  [address]  [place] [address] Tuesday:          8:00am -- 2:00pm         Urgent Care Locations Clinic Hours Telephone Numbers     [facility]  [address]  [place] [address]     Monday-Friday:     11:00am - 9:00pm    Saturday-Sunday:  9:00am - 5:00pm   [phone]     [facility]  [address] [place] [address]     Monday-Friday:      5:00pm - 9:00pm     Saturday-Sunday:  9:00am - 5:00pm   [phone]                 Follow-ups after your visit        Your next 10 appointments already scheduled     Aug 21, 2018  1:00 PM CDT   Level 3 with [address] ([facility])    [address] [address] [place] [id number] [phone]            Sep 19, 2018  2:40 PM CDT   [facility]   [facility]    [address] [facility] [id number] [phone]              Future tests that were ordered for you today     Open  "Future Orders        Priority Expected Expires Ordered    CT Sinus w/o Contrast Routine  8/21/2019 8/21/2018            Who to contact     If you have questions or need follow up information about today's clinic visit or your schedule please contact [facility] directly at [phone].  Normal or non-critical lab and imaging results will be communicated to you by MyChart, letter or phone within 4 business days after the clinic has received the results. If you do not hear from us within 7 days, please contact the clinic through [employer] or phone. If you have a critical or abnormal lab result, we will notify you by phone as soon as possible.  Submit refill requests through [employer] or call your pharmacy and they will forward the refill request to us. Please allow 3 business days for your refill to be completed.          Additional Information About Your Visit        MyChart Information     [employer] gives you secure access to your electronic health record. If you see a primary care provider, you can also send messages to your care team and make appointments. If you have questions, please call your primary care clinic.  If you do not have a primary care provider, please call [phone] and they will assist you.        Care EveryWhere ID     This is your Care EveryWhere ID. This could be used by other organizations to access your [place] medical records  [id number]        Your Vitals Were     Pulse Respirations Height Pulse Oximetry BMI (Body Mass Index)       104 16 1.956 m (6' 5\") 96% 40.91 kg/m2        Blood Pressure from Last 3 Encounters:   08/21/18 148/90   06/26/18 137/90   05/31/18 131/83    Weight from Last 3 Encounters:   08/21/18 (!) 156.5 kg (345 lb)   06/26/18 (!) 156.4 kg (344 lb 12.8 oz)   05/31/18 (!) 154.3 kg (340 lb 3.2 oz)               Primary Care Provider Office Phone # Fax #    Ksenia Lyles [phone] [address] [facility] " [id number]        Goals        General    I will continue to attend Psychiatry appointments for medication management, 1/14/2014 (pt-stated)     Notes - Note edited  8/24/2016  4:04 PM by Dalila Cavazos LSW    As of today's date 8/24/2016 goal is met at 51 - 75%.   Goal Status:  Ongoing Sees  Therapist every other week and Psych PA every 3 weeks.  As of today's date 5/25/2016 goal is met at 51 - 75%.   Goal Status:  Showing progress-  Meeting with Psych PA for second time tomorrow  To review meds As of today's date 4/11/2016 goal is met at 51 - 75%.   Goal Status:  Showing progress  As of today's date 1/14/2014 goal is met at 51 - 75%.   Goal Status:  Active        I will schedule therapy with new counselor, 1/14/2014 (pt-stated)     Notes - Note edited  5/25/2016  1:54 PM by Dalila Cavazos LSW    As of today's date 5/25/2016 goal is met at 76 - 100%.   Goal Status:  Ongoing New therapist and Psych PA on a regular basis  As of today's date 5/10/2016 goal is met at 76 - 100%.   Goal Status:  Ongoing met with new therapist at [facility]  As of today's date 4/11/2016 goal is met at 51 - 75%.   Goal Status:  Ongoing meets with therapist regularly  As of today's date 1/14/2014 goal is met at 0 - 25%.   Goal Status:  Active        Psychosocial I need some help with cleaning (pt-stated)     Notes - Note edited  6/23/2016  1:57 PM by Dalila Cavazos LSW    As of today's date 6/23/2016 goal is met at 26 - 50%.   Goal Status:  Showing progress met with [employer]. Has not yet made a decision  As of today's date 5/25/2016 goal is met at 0 - 25%.   Goal Status:  Ongoing agency had to reschedule appt.  As of today's date 5/10/2016 goal is met at 0 - 25%.   Goal Status:  Active referral to [facility]        Equal Access to Services     ANNA CANO AH: Madhuri Darby, fide thompson, qaradha kaalmada adeisabell alvarado. [facility] [phone].    ATENCIÓN: Si michael sanchez faustin  disposición servicios gratuitos de asistencia lingüística. Madelyn eduardo [phone].    We comply with applicable federal civil rights laws and [place] laws. We do not discriminate on the basis of race, color, national origin, age, disability, sex, sexual orientation, or gender identity.            Thank you!     Thank you for choosing [facility]  for your care. Our goal is always to provide you with excellent care. Hearing back from our patients is one way we can continue to improve our services. Please take a few minutes to complete the written survey that you may receive in the mail after your visit with us. Thank you!             Your Updated Medication List - Protect others around you: Learn how to safely use, store and throw away your medicines at www.disposemymeds.org.          This list is accurate as of 8/21/18  9:09 AM.  Always use your most recent med list.                   Brand Name Dispense Instructions for use Diagnosis    acetaminophen 500 MG Caps     60 capsule    Take 500 mg by mouth every 4 hours as needed        * albuterol 108 (90 Base) MCG/ACT inhaler    PROAIR HFA/PROVENTIL HFA/VENTOLIN HFA     Inhale 2 puffs into the lungs every 4 hours as needed        * albuterol (2.5 MG/3ML) 0.083% neb solution      Inhale 2.5 mg into the lungs        ALPRAZolam 1 MG 24 hr tablet    XANAX XR     Take 1 mg by mouth every morning        atorvastatin 40 MG tablet    LIPITOR    90 tablet    TAKE 1 TABLET (40 MG) BY MOUTH DAILY    Mixed hyperlipidemia       celecoxib 200 MG capsule    celeBREX    180 capsule    TAKE 1 CAPSULE BY MOUTH TWICE DAILY AS NEEDED FORMODERATE PAIN    Chronic midline low back pain without sciatica       cetirizine 10 MG tablet    zyrTEC    30 tablet    Take 1 tablet (10 mg) by mouth At Bedtime    Cough       cyanocobalamin 1000 MCG/ML injection    VITAMIN B12    10 mL    Inject 1 mL (1,000 mcg) into the muscle every 30 days    B12 deficiency       EPINEPHrine 0.3  MG/0.3ML injection 2-pack    EPIPEN/ADRENACLICK/or ANY BX GENERIC EQUIV    0.6 mL    Inject 0.3 mLs (0.3 mg) into the muscle once as needed for anaphylaxis    Food allergy       ginger root 550 MG Caps capsule      Take 550 mg by mouth Up to three times daily        hydrOXYzine 50 MG tablet    ATARAX     Take 50 mg by mouth daily        INFLIXIMAB IV           lamoTRIgine 25 MG tablet    LaMICtal     Take 200 mg by mouth daily        levothyroxine 75 MCG tablet    SYNTHROID/LEVOTHROID    90 tablet    TAKE 1 TABLET (75 MCG) BY MOUTH EVERY MORNING.    Acquired hypothyroidism       loperamide 2 MG capsule    IMODIUM     Take 2 mg by mouth 4 times daily as needed for diarrhea        metoprolol succinate 200 MG 24 hr tablet    TOPROL-XL    90 tablet    Take 1 tablet (200 mg) by mouth daily    Essential hypertension with goal blood pressure less than 140/90       montelukast 10 MG tablet    SINGULAIR     Take 10 mg by mouth        omega-3 acid ethyl esters 1 g capsule    Lovaza    360 capsule    TAKE 2 CAPSULES BY MOUTH TWICE DAILY.    Hypertriglyceridemia       omeprazole 20 MG tablet      Take 20 mg by mouth daily        order for DME     12 each    Equipment being ordered: 1 ml tuberculin syringes to be used for Vitamin B12 injections.    Vitamin B12 deficiency (non anaemic)       order for DME     1 Units    SI-loc belt    SI (sacroiliac) joint dysfunction       order for DME      Respironics REMSTAR 60 Series Auto CPAP 10-12 cm H2O, Wisp nasal mask w/a large cushion and a chinstrap        oxyCODONE IR 5 MG tablet    ROXICODONE    35 tablet    Take 1-2 tablets (5-10 mg) by mouth every 6 hours as needed for pain (maximum 4 tablet(s) per day)    Facet arthropathy       pramipexole 0.5 MG tablet    MIRAPEX     Take 1/2 tab by mouth daily. Every 3 days, increase by 1/2 tab until taking a total of 4 tabs daily.        pregabalin 50 MG capsule    LYRICA    60 capsule    Take 1 capsule (50 mg) by mouth 2 times daily     Chronic midline low back pain without sciatica       ramipril 5 MG capsule    ALTACE    90 capsule    TAKE 1 CAPSULE BY MOUTH DAILY    Hypertension goal BP (blood pressure) < 140/90       risperiDONE 1 MG tablet    risperDAL     Take 1 mg by mouth daily        tiZANidine 4 MG tablet    ZANAFLEX    90 tablet    TAKE 1 TABLET BY MOUTH THREE TIMES DAILY AS NEEDED    Dorsalgia       vitamin D3 80744 UNITS capsule    CHOLECALCIFEROL    24 capsule    Take one capsule every two weeks.    Vitamin D deficiency       * Notice:  This list has 2 medication(s) that are the same as other medications prescribed for you. Read the directions carefully, and ask your doctor or other care provider to review them with you.

## 2018-08-21 NOTE — MR AVS SNAPSHOT
After Visit Summary   8/21/2018    Joel Pineda    MRN: [id number]           Patient Information     Date Of Birth          1975        Visit Information        Provider Department      8/21/2018 1:00 PM BAY 2 [facility]        Today's Diagnoses     Ankylosing spondylitis of sacral region (H)    -  1       Follow-ups after your visit        Your next 10 appointments already scheduled     Aug 23, 2018  9:15 AM CDT   CT SINUS W/O CONTRAST with MGCT1   [facility]    [address] [place] [id number] [phone]           Please bring any scans or X-rays taken at other hospitals, if similar tests were done. Also bring a list of your medicines, including vitamins, minerals and over-the-counter drugs. It is safest to leave personal items at home.  Be sure to tell your doctor:   If you have any allergies.   If there s any chance you are pregnant.   If you are breastfeeding.  For a sinus scan: Use your nose spray (nasal decongestant spray) as directed.  You do not need to do anything special to prepare for this exam.  Please wear loose clothing, such as a sweat suit or jogging clothes. Avoid snaps, zippers and other metal. We may ask you to undress and put on a hospital gown.            Sep 19, 2018  2:40 PM CDT   Return Visit with Oneil Baxter MD   [facility] ([facility] [place])    [address]  [place] [id number] [phone]              Future tests that were ordered for you today     Open Future Orders        Priority Expected Expires Ordered    CT Sinus w/o Contrast Routine  8/21/2019 8/21/2018            Who to contact     If you have questions or need follow up information about today's clinic visit or your schedule please contact [facility] directly at [phone].  Normal or non-critical lab and imaging results will be communicated to you by  MyChart, letter or phone within 4 business days after the clinic has received the results. If you do not hear from us within 7 days, please contact the clinic through [employer] or phone. If you have a critical or abnormal lab result, we will notify you by phone as soon as possible.  Submit refill requests through [employer] or call your pharmacy and they will forward the refill request to us. Please allow 3 business days for your refill to be completed.          Additional Information About Your Visit        [employer] Information     [employer] gives you secure access to your electronic health record. If you see a primary care provider, you can also send messages to your care team and make appointments. If you have questions, please call your primary care clinic.  If you do not have a primary care provider, please call [phone] and they will assist you.      [employer] is an electronic gateway that provides easy, online access to your medical records. With [employer], you can request a clinic appointment, read your test results, renew a prescription or communicate with your care team.     To access your existing account, please contact your [facility] or call [phone] for assistance.        Care EveryWhere ID     This is your Care EveryWhere ID. This could be used by other organizations to access your [place] medical records  [id number]        Your Vitals Were     Pulse Temperature Respirations BMI (Body Mass Index)          73 98.4  F (36.9  C) (Oral) 18 40.37 kg/m2         Blood Pressure from Last 3 Encounters:   08/21/18 124/83   08/21/18 148/90   06/26/18 137/90    Weight from Last 3 Encounters:   08/21/18 (!) 154.4 kg (340 lb 6.4 oz)   08/21/18 (!) 156.5 kg (345 lb)   06/26/18 (!) 156.4 kg (344 lb 12.8 oz)              Today, you had the following     No orders found for display       Primary Care Provider Office Phone # Fax #    Ksenia Lyles [phone]        [address] [facility] [address]        Goals        General    I will continue to attend Psychiatry appointments for medication management, 1/14/2014 (pt-stated)     Notes - Note edited  8/24/2016  4:04 PM by Dalila Cavazos LSW    As of today's date 8/24/2016 goal is met at 51 - 75%.   Goal Status:  Ongoing Sees  Therapist every other week and Psych PA every 3 weeks.  As of today's date 5/25/2016 goal is met at 51 - 75%.   Goal Status:  Showing progress-  Meeting with Psych PA for second time tomorrow  To review meds As of today's date 4/11/2016 goal is met at 51 - 75%.   Goal Status:  Showing progress  As of today's date 1/14/2014 goal is met at 51 - 75%.   Goal Status:  Active        I will schedule therapy with new counselor, 1/14/2014 (pt-stated)     Notes - Note edited  5/25/2016  1:54 PM by Dalila Cavazos LSW    As of today's date 5/25/2016 goal is met at 76 - 100%.   Goal Status:  Ongoing New therapist and Psych PA on a regular basis  As of today's date 5/10/2016 goal is met at 76 - 100%.   Goal Status:  Ongoing met with new therapist at [facility]  As of today's date 4/11/2016 goal is met at 51 - 75%.   Goal Status:  Ongoing meets with therapist regularly  As of today's date 1/14/2014 goal is met at 0 - 25%.   Goal Status:  Active        Psychosocial I need some help with cleaning (pt-stated)     Notes - Note edited  6/23/2016  1:57 PM by Dalila Cavazos LSW    As of today's date 6/23/2016 goal is met at 26 - 50%.   Goal Status:  Showing progress met with [employer]. Has not yet made a decision  As of today's date 5/25/2016 goal is met at 0 - 25%.   Goal Status:  Ongoing agency had to reschedule appt.  As of today's date 5/10/2016 goal is met at 0 - 25%.   Goal Status:  Active referral to [facility]        Equal Access to Services     [facility] JEROME AH: Madhuri Darby, waaxda luqadaofelia valentine waxay idiin hayaan adeeg kharash la'aan ah. Brielle mai  [phone].    ATENCIÓN: Si judith esteban, tiene a faustin disposición servicios gratuitos de asistencia lingüística. Madelyn eduardo [phone].    We comply with applicable federal civil rights laws and [place] laws. We do not discriminate on the basis of race, color, national origin, age, disability, sex, sexual orientation, or gender identity.            Thank you!     Thank you for choosing [facility]  for your care. Our goal is always to provide you with excellent care. Hearing back from our patients is one way we can continue to improve our services. Please take a few minutes to complete the written survey that you may receive in the mail after your visit with us. Thank you!             Your Updated Medication List - Protect others around you: Learn how to safely use, store and throw away your medicines at www.disposemymeds.org.          This list is accurate as of 8/21/18  4:18 PM.  Always use your most recent med list.                   Brand Name Dispense Instructions for use Diagnosis    acetaminophen 500 MG Caps     60 capsule    Take 500 mg by mouth every 4 hours as needed        * albuterol 108 (90 Base) MCG/ACT inhaler    PROAIR HFA/PROVENTIL HFA/VENTOLIN HFA     Inhale 2 puffs into the lungs every 4 hours as needed        * albuterol (2.5 MG/3ML) 0.083% neb solution      Inhale 2.5 mg into the lungs        ALPRAZolam 1 MG 24 hr tablet    XANAX XR     Take 1 mg by mouth every morning        atorvastatin 40 MG tablet    LIPITOR    90 tablet    TAKE 1 TABLET (40 MG) BY MOUTH DAILY    Mixed hyperlipidemia       celecoxib 200 MG capsule    celeBREX    180 capsule    TAKE 1 CAPSULE BY MOUTH TWICE DAILY AS NEEDED FORMODERATE PAIN    Chronic midline low back pain without sciatica       cetirizine 10 MG tablet    zyrTEC    30 tablet    Take 1 tablet (10 mg) by mouth At Bedtime    Cough       cyanocobalamin 1000 MCG/ML injection    VITAMIN B12    10 mL    Inject 1 mL (1,000 mcg) into the muscle  every 30 days    B12 deficiency       EPINEPHrine 0.3 MG/0.3ML injection 2-pack    EPIPEN/ADRENACLICK/or ANY BX GENERIC EQUIV    0.6 mL    Inject 0.3 mLs (0.3 mg) into the muscle once as needed for anaphylaxis    Food allergy       ginger root 550 MG Caps capsule      Take 550 mg by mouth Up to three times daily        hydrOXYzine 50 MG tablet    ATARAX     Take 50 mg by mouth daily        INFLIXIMAB IV           lamoTRIgine 25 MG tablet    LaMICtal     Take 200 mg by mouth daily        levothyroxine 75 MCG tablet    SYNTHROID/LEVOTHROID    90 tablet    TAKE 1 TABLET (75 MCG) BY MOUTH EVERY MORNING.    Acquired hypothyroidism       loperamide 2 MG capsule    IMODIUM     Take 2 mg by mouth 4 times daily as needed for diarrhea        metoprolol succinate 200 MG 24 hr tablet    TOPROL-XL    90 tablet    Take 1 tablet (200 mg) by mouth daily    Essential hypertension with goal blood pressure less than 140/90       montelukast 10 MG tablet    SINGULAIR     Take 10 mg by mouth        omega-3 acid ethyl esters 1 g capsule    Lovaza    360 capsule    TAKE 2 CAPSULES BY MOUTH TWICE DAILY.    Hypertriglyceridemia       omeprazole 20 MG tablet      Take 20 mg by mouth daily        order for DME     12 each    Equipment being ordered: 1 ml tuberculin syringes to be used for Vitamin B12 injections.    Vitamin B12 deficiency (non anaemic)       order for DME     1 Units    SI-loc belt    SI (sacroiliac) joint dysfunction       order for DME      Respironics REMSTAR 60 Series Auto CPAP 10-12 cm H2O, Wisp nasal mask w/a large cushion and a chinstrap        oxyCODONE IR 5 MG tablet    ROXICODONE    35 tablet    Take 1-2 tablets (5-10 mg) by mouth every 6 hours as needed for pain (maximum 4 tablet(s) per day)    Facet arthropathy       pramipexole 0.5 MG tablet    MIRAPEX     Take 1/2 tab by mouth daily. Every 3 days, increase by 1/2 tab until taking a total of 4 tabs daily.        pregabalin 50 MG capsule    LYRICA    60 capsule     Take 1 capsule (50 mg) by mouth 2 times daily    Chronic midline low back pain without sciatica       ramipril 5 MG capsule    ALTACE    90 capsule    TAKE 1 CAPSULE BY MOUTH DAILY    Hypertension goal BP (blood pressure) < 140/90       risperiDONE 1 MG tablet    risperDAL     Take 1 mg by mouth daily        tiZANidine 4 MG tablet    ZANAFLEX    90 tablet    TAKE 1 TABLET BY MOUTH THREE TIMES DAILY AS NEEDED    Dorsalgia       vitamin D3 89910 UNITS capsule    CHOLECALCIFEROL    24 capsule    Take one capsule every two weeks.    Vitamin D deficiency       * Notice:  This list has 2 medication(s) that are the same as other medications prescribed for you. Read the directions carefully, and ask your doctor or other care provider to review them with you.

## 2018-08-21 NOTE — PROGRESS NOTES
"Infusion Nursing Note:  Karthik Pray presents today for Remicade.   Patient seen by provider today: No   present during visit today: Not Applicable.    Note: Pt states he had some trouble the day after his last infusion, he called his provider and they decided to \"stay the course\".  Asked about a 1 hour infusion, pt states, \"No I would not tolerate a faster rate\".  Will keep at 2 hour infusion.    Pt states he had his teeth cleaned last week, but no extractions.  Has had a headache that comes and goes over the past few weeks, has been following up with his provider, seen by an opthamologist that found no reason for headache, will have a CT next week , denies any sinus infections, fevers.  Denies any reason to hold treatment today.    Intravenous Access:  Peripheral IV placed.    Treatment Conditions:  [facility] Infusion Checklist:    ~~~ NOTE: If the patient answers yes to any of the questions below, hold the infusion and contact ordering provider or on-call provider.    1. Have you recently had an elevated temperature, fever, chills, productive cough, coughing for 3 weeks or longer or hemoptysis,  abnormal vital signs, night sweats,  chest pain or have you noticed a decrease in your appetite, unexplained weight loss or fatigue? NO  2. Do you have any open wounds or new incisions? No  3. Do you have any recent or upcoming hospitalizations, surgeries or dental procedures? No  4. Do you currently have or recently have had any signs of illness or infection or are you on any antibiotics? No  5. Have you had any new, sudden or worsening abdominal pain? No  6. Have you or anyone in your household received a live vaccination in the past 4 weeks? Please note:  No live vaccines while on biologic/chemotherapy until 6 months after the last treatment.  Patient can receive the flu vaccine (shot only) and the pneumovax.  It is optimal for the patient to get these vaccines mid cycle, but they can be given at any time as " long as it is not on the day of the infusion. No  7. Have you recently been diagnosed with any new nervous system diseases (ie. Multiple sclerosis, Guillain [place], seizures, neurological changes) or cancer diagnosis? Are you on any form of radiation or chemotherapy? No  8. Are you pregnant or breast feeding or do you have plans of pregnancy in the future? No  9. Have you been having any signs of worsening depression or suicidal ideations?  (benlysta only) No  10. Have there been any other new onset medical symptoms? No - just occasional headache that patient states he is following up with provider about at this time.  .      Post Infusion Assessment:  Patient tolerated infusion without incident.  Blood return noted pre and post infusion.  Site patent and intact, free from redness, edema or discomfort.  No evidence of extravasations.  Access discontinued per protocol.    Discharge Plan:   Return with Dr Baxter on 09/18/18.  Instructed to make more appointments in 8 weeks.  Discharge instructions reviewed with: Patient.  Patient and/or family verbalized understanding of discharge instructions and all questions answered.  Patient discharged in stable condition accompanied by: self.  Departure Mode: Ambulatory.    Diana Moy RN

## 2018-08-21 NOTE — LETTER
8/21/2018         RE: Joel Pineda  [id number] [facility] [address]        Dear Colleague,    Thank you for referring your patient, Joel Pineda, to the [facility]. Please see a copy of my visit note below.    History of Present Illness - Joel Pineda is a 43 year old male here to see me for the first time for possible sinus issues.    He had an ethmoidectomy in 2002, and rhinoplasty in 2010.  Since those provcedures he has gotten a lot better since then, only an ingfection once per year.  But then about 2 weeks ago he started to get pain around the RIGHT orbit and forehead, but no sinusitis symptoms or prodromal URI.      He did have a dental cleaning last week, but no other acute dental issues.  He does use a CPAP mask.  No change in environment.    He has previously seen Dr. Sun with [facility] for ear pain, but it was worked up and no eustachian tube dysfunction or ear disease was found, and was attributed to temporomandibular disease.    Past Medical History -   Patient Active Problem List   Diagnosis     Major depressive disorder, recurrent episode (H)     Intermittent asthma     Hyperlipidemia LDL goal <130     Hypertension goal BP (blood pressure) < 140/90     Chronic nonallergic rhinitis     History of diverticulitis of colon     Obesity (BMI 30-39.9)     Health Care Home     PE (pulmonary embolism)     Diverticulosis     GERD (gastroesophageal reflux disease)     Anxiety     LLOYD (obstructive sleep apnea)- mild (AHI 11)     Intracranial arachnoid cyst     Facet arthritis of cervical region (H)     Acquired hypothyroidism     Bipolar 2 disorder (H)     Allergic rhinitis, unspecified allergic rhinitis type     Chronic midline low back pain without sciatica     Irritable bowel syndrome with diarrhea     B12 deficiency     Impaired glucose tolerance     Essential hypertension with goal blood pressure less than 140/90     Psychophysiological insomnia     Chronic  pain syndrome     Ankylosing spondylitis of sacral region (H)     Morbid obesity due to hypertriglyceridemia (H)     Fatty infiltration of liver     Ankylosing spondylitis lumbar region (H)     Sacroiliac joint dysfunction       Current Medications -   Current Outpatient Prescriptions:      acetaminophen 500 MG CAPS, Take 500 mg by mouth every 4 hours as needed, Disp: 60 capsule, Rfl:      albuterol (2.5 MG/3ML) 0.083% neb solution, Inhale 2.5 mg into the lungs, Disp: , Rfl:      albuterol (PROAIR HFA/PROVENTIL HFA/VENTOLIN HFA) 108 (90 BASE) MCG/ACT Inhaler, Inhale 2 puffs into the lungs every 4 hours as needed, Disp: , Rfl:      ALPRAZolam (XANAX XR) 1 MG 24 hr tablet, Take 1 mg by mouth every morning, Disp: , Rfl:      atorvastatin (LIPITOR) 40 MG tablet, TAKE 1 TABLET (40 MG) BY MOUTH DAILY, Disp: 90 tablet, Rfl: 1     celecoxib (CELEBREX) 200 MG capsule, TAKE 1 CAPSULE BY MOUTH TWICE DAILY AS NEEDED FORMODERATE PAIN, Disp: 180 capsule, Rfl: 1     cetirizine (ZYRTEC) 10 MG tablet, Take 1 tablet (10 mg) by mouth At Bedtime, Disp: 30 tablet, Rfl: 11     cyanocobalamin (VITAMIN B12) 1000 MCG/ML injection, Inject 1 mL (1,000 mcg) into the muscle every 30 days, Disp: 10 mL, Rfl: 1     EPINEPHrine (EPIPEN/ADRENACLICK/OR ANY BX GENERIC EQUIV) 0.3 MG/0.3ML injection 2-pack, Inject 0.3 mLs (0.3 mg) into the muscle once as needed for anaphylaxis, Disp: 0.6 mL, Rfl: 3     Ginger, Zingiber officinalis, (GINGER ROOT) 550 MG CAPS capsule, Take 550 mg by mouth Up to three times daily, Disp: , Rfl:      hydrOXYzine (ATARAX) 50 MG tablet, Take 50 mg by mouth daily, Disp: , Rfl:      INFLIXIMAB IV, , Disp: , Rfl:      lamoTRIgine (LAMICTAL) 25 MG tablet, Take 200 mg by mouth daily , Disp: , Rfl:      levothyroxine (SYNTHROID/LEVOTHROID) 75 MCG tablet, TAKE 1 TABLET (75 MCG) BY MOUTH EVERY MORNING., Disp: 90 tablet, Rfl: 0     loperamide (IMODIUM) 2 MG capsule, Take 2 mg by mouth 4 times daily as needed for diarrhea, Disp: , Rfl:       metoprolol succinate (TOPROL-XL) 200 MG 24 hr tablet, Take 1 tablet (200 mg) by mouth daily, Disp: 90 tablet, Rfl: 1     montelukast (SINGULAIR) 10 MG tablet, Take 10 mg by mouth, Disp: , Rfl:      omega-3 acid ethyl esters (LOVAZA) 1 g capsule, TAKE 2 CAPSULES BY MOUTH TWICE DAILY., Disp: 360 capsule, Rfl: 1     omeprazole 20 MG tablet, Take 20 mg by mouth daily , Disp: , Rfl:      order for DME, Respironics REMSTAR 60 Series Auto CPAP 10-12 cm H2O, Wisp nasal mask w/a large cushion and a chinstrap, Disp: , Rfl:      order for DME, SI-loc belt, Disp: 1 Units, Rfl: 0     order for DME, Equipment being ordered: 1 ml tuberculin syringes to be used for Vitamin B12 injections., Disp: 12 each, Rfl: 1     oxyCODONE IR (ROXICODONE) 5 MG tablet, Take 1-2 tablets (5-10 mg) by mouth every 6 hours as needed for pain (maximum 4 tablet(s) per day), Disp: 35 tablet, Rfl: 0     pramipexole (MIRAPEX) 0.5 MG tablet, Take 1/2 tab by mouth daily. Every 3 days, increase by 1/2 tab until taking a total of 4 tabs daily., Disp: , Rfl:      pregabalin (LYRICA) 50 MG capsule, Take 1 capsule (50 mg) by mouth 2 times daily, Disp: 60 capsule, Rfl: 1     ramipril (ALTACE) 5 MG capsule, TAKE 1 CAPSULE BY MOUTH DAILY, Disp: 90 capsule, Rfl: 1     risperiDONE (RISPERDAL) 1 MG tablet, Take 1 mg by mouth daily, Disp: , Rfl:      tiZANidine (ZANAFLEX) 4 MG tablet, TAKE 1 TABLET BY MOUTH THREE TIMES DAILY AS NEEDED, Disp: 90 tablet, Rfl: 4     vitamin D3 (CHOLECALCIFEROL) 79856 UNITS capsule, Take one capsule every two weeks., Disp: 24 capsule, Rfl: 1    Allergies -   Allergies   Allergen Reactions     Banana Shortness Of Breath     Pt reports organic Banana is okay.      Nitroglycerin Palpitations     Penicillins Anaphylaxis     Provigil [Modafinil] Shortness Of Breath     headache     Ciprofloxacin Palpitations, Other (See Comments) and Rash     dizziness (versus metronidazole taken at same time)     Gadolinium Hives and Itching     Patient was  premedicated for the contrast allergy. He did still have a reaction a few hours after injection. Hives and itching. Dr. Gomez told tech to inform pt he should only have contrast again in the future when premedicated and at a hospital. Not at an outpatient facility.      Dulera Other (See Comments)     Hoarse voice     Dye [Contrast Dye] Other (See Comments) and Hives     Moderate flushing, CT contrast     Levaquin Other (See Comments)     tendonitis     Levofloxacin      Tendonitis  Tendonitis     Neurontin [Gabapentin] Hives     Moderate hives     Nortriptyline Hives     Varicella Virus Vaccine Live      Rash     Ciprofloxacin Palpitations     Flagyl [Metronidazole Hcl] Palpitations and Hives     Latex Rash     Metronidazole Palpitations, Other (See Comments) and Rash     dizziness (versus ciprofloxacin taken at same time)     No Clinical Screening - See Comments Rash     Nitrile gloves       Social History -   Social History     Social History     Marital status: Single     Spouse name: N/A     Number of children: N/A     Years of education: N/A     Occupational History      [employer]     paraprofessional      [employer]      Disability     Social History Main Topics     Smoking status: Never Smoker     Smokeless tobacco: Never Used     Alcohol use No     Drug use: No     Sexual activity: No     Other Topics Concern     Parent/Sibling W/ Cabg, Mi Or Angioplasty Before 65f 55m? No     Sleep Concern Yes     Stress Concern Yes     Back Care Yes     Seat Belt Yes     Social History Narrative       Family History -   Family History   Problem Relation Age of Onset     Musculoskeletal Disorder Mother      back     Anxiety Disorder Mother      Colon Polyps Mother      Ulcerative Colitis Mother      and ischemic small intestine, surgery     Hypertension Mother      Breast Cancer Mother      Osteoperosis Mother      Diabetes Mother      Type 2, Diagnosed in 2014     Depression Mother      Takes  "Cymbalta to help with chronic pain + depx     Thyroid Disease Mother      Hypothyroidism     Obesity Mother      Under much better control latter half of 2015     Musculoskeletal Disorder Father      back     Substance Abuse Father      Hypertension Father      Hyperlipidemia Father      Depression Father      Off meds for many years. Seems \"ok\"     HEART DISEASE Maternal Grandmother      HEART DISEASE Maternal Grandfather      Psychotic Disorder Paternal Grandfather      Suicide Paternal Grandfather      Depression Paternal Grandfather      Pediatrician. Committed suicide by pistol in 1990.     Musculoskeletal Disorder Brother      back     Depression Brother      Expressed as anger and moodiness     Substance Abuse Sister      Depression Sister      Mental Health Therapist, yet no anti-depressants?     Anxiety Disorder Sister      Mental Health Therapist, yet no anti-anxiety meds?     Other Cancer Other      Bladder Cancer - Fatal     Substance Abuse Brother      Colon Cancer No family hx of      Crohn Disease No family hx of      Anesthesia Reaction No family hx of      Cancer No family hx of      No family history of skin cancer       Review of Systems - As per HPI and PMHx, otherwise 10+ system review of the head and neck, and general constitution is negative.    Physical Exam  /90  Pulse 104  Resp 16  Ht 1.956 m (6' 5\")  Wt (!) 156.5 kg (345 lb)  SpO2 96%  BMI 40.91 kg/m2    General - The patient is well nourished and well developed, and appears to have good nutritional status.  Alert and oriented to person and place, answers questions and cooperates with examination appropriately.   Head and Face - Normocephalic and atraumatic, with no gross asymmetry noted of the contour of the facial features.  The facial nerve is intact, with strong symmetric movements.  Voice and Breathing - The patient was breathing comfortably without the use of accessory muscles. There was no wheezing, stridor, or stertor.  " The patients voice was clear and strong, and had appropriate pitch and quality.  Ears - The tympanic membranes are normal in appearance, bony landmarks are intact.  No retraction, perforation, or masses.  No fluid or purulence was seen in the external canal or the middle ear. No evidence of infection of the middle ear or external canal, cerumen was normal in appearance.  Eyes - Extraocular movements intact, and the pupils were reactive to light.  Sclera were not icteric or injected, conjunctiva were pink and moist.  Mouth - Examination of the oral cavity showed pink, healthy oral mucosa. No lesions or ulcerations noted.  The tongue was mobile and midline, and the dentition were in good condition.    Throat - The walls of the oropharynx were smooth, pink, moist, symmetric, and had no lesions or ulcerations.  The tonsillar pillars and soft palate were symmetric.  The uvula was midline on elevation.    Neck - Normal midline excursion of the laryngotracheal complex during swallowing.  Full range of motion on passive movement.  Palpation of the occipital, submental, submandibular, internal jugular chain, and supraclavicular nodes did not demonstrate any abnormal lymph nodes or masses.  The carotid pulse was palpable bilaterally.  Palpation of the thyroid was soft and smooth, with no nodules or goiter appreciated.  The trachea was mobile and midline.  Nose - External contour is symmetric, no gross deflection or scars.  Nasal mucosa is pink and moist with no abnormal mucus.  The septum was midline and non-obstructive, turbinates of normal size and position.  No polyps, masses, or purulence noted on examination.      A/P - Joel Pineda is a 43 year old male  (G50.1) Facial pain, atypical  (primary encounter diagnosis)    This presentation of unilateral RIGHT periorbital pain is not typical for sinusitis, but given his history, I will get a CT scan of the sinuses to make sure.    I will call him with the results and  plan.        Again, thank you for allowing me to participate in the care of your patient.        Sincerely,        Chico Jimenez MD

## 2018-08-21 NOTE — PATIENT INSTRUCTIONS
Scheduling Information  To schedule your CT/MRI scan, please contact [facility] at [phone] OR [facility] at [phone]    To schedule your Surgery, please contact our Specialty Schedulers at [phone]      ENT Clinic Locations Clinic Hours Telephone Number     [facility]  [address] [place] [address]   Monday:           1:00pm -- 5:00pm    Friday:              8:00am - 12:00pm   To schedule/reschedule an appointment with   Dr. Jimenez,   please contact our   Specialty Scheduling Department at:     [phone]       [facility]  [address] [place] [address] Tuesday:          8:00am -- 2:00pm         Urgent Care Locations Clinic Hours Telephone Numbers     [facility]  [address] [place] [address]     Monday-Friday:     11:00am - 9:00pm    Saturday-Sunday:  9:00am - 5:00pm   [phone]     [facility]  [address] [place] [address]     Monday-Friday:      5:00pm - 9:00pm     Saturday-Sunday:  9:00am - 5:00pm   [phone]

## 2018-08-23 ENCOUNTER — RADIANT APPOINTMENT (OUTPATIENT)
Dept: CT IMAGING | Facility: CLINIC | Age: 43
End: 2018-08-23
Attending: OTOLARYNGOLOGY
Payer: COMMERCIAL

## 2018-08-23 DIAGNOSIS — G50.1 FACIAL PAIN, ATYPICAL: ICD-10-CM

## 2018-08-23 PROCEDURE — 70486 CT MAXILLOFACIAL W/O DYE: CPT | Performed by: RADIOLOGY

## 2018-08-27 ENCOUNTER — MYC MEDICAL ADVICE (OUTPATIENT)
Dept: FAMILY MEDICINE | Facility: CLINIC | Age: 43
End: 2018-08-27

## 2018-08-28 ENCOUNTER — OFFICE VISIT (OUTPATIENT)
Dept: FAMILY MEDICINE | Facility: CLINIC | Age: 43
End: 2018-08-28
Payer: COMMERCIAL

## 2018-08-28 VITALS
HEIGHT: 77 IN | HEART RATE: 89 BPM | RESPIRATION RATE: 24 BRPM | WEIGHT: 315 LBS | SYSTOLIC BLOOD PRESSURE: 138 MMHG | BODY MASS INDEX: 37.19 KG/M2 | DIASTOLIC BLOOD PRESSURE: 98 MMHG | TEMPERATURE: 98.8 F | OXYGEN SATURATION: 98 %

## 2018-08-28 DIAGNOSIS — Z11.4 SCREENING FOR HIV (HUMAN IMMUNODEFICIENCY VIRUS): ICD-10-CM

## 2018-08-28 DIAGNOSIS — J45.21 MILD INTERMITTENT ASTHMA WITH ACUTE EXACERBATION: Primary | Chronic | ICD-10-CM

## 2018-08-28 DIAGNOSIS — I10 HYPERTENSION GOAL BP (BLOOD PRESSURE) < 140/90: Chronic | ICD-10-CM

## 2018-08-28 PROCEDURE — 99214 OFFICE O/P EST MOD 30 MIN: CPT | Performed by: PHYSICIAN ASSISTANT

## 2018-08-28 RX ORDER — PREDNISONE 20 MG/1
40 TABLET ORAL DAILY
Qty: 10 TABLET | Refills: 0 | Status: SHIPPED | OUTPATIENT
Start: 2018-08-28 | End: 2019-02-12

## 2018-08-28 RX ORDER — RAMIPRIL 10 MG/1
10 CAPSULE ORAL DAILY
Qty: 90 CAPSULE | Refills: 1 | Status: SHIPPED | OUTPATIENT
Start: 2018-08-28 | End: 2019-03-10

## 2018-08-28 RX ORDER — AZITHROMYCIN 250 MG/1
TABLET, FILM COATED ORAL
Qty: 6 TABLET | Refills: 0 | Status: SHIPPED | OUTPATIENT
Start: 2018-08-28 | End: 2018-10-02

## 2018-08-28 ASSESSMENT — PAIN SCALES - GENERAL: PAINLEVEL: MILD PAIN (3)

## 2018-08-28 NOTE — TELEPHONE ENCOUNTER
Per policy, patient's should be encouraged to make an e-visit or phone visit for new complaints.  We should attempt to be consistent with application of this policy.  If met with complaint or significant resistance please send this back to me to deal with.

## 2018-08-28 NOTE — PROGRESS NOTES
SUBJECTIVE:   Joel Pineda is a 43 year old male who presents to clinic today for the following health issues:      Hypertension Follow-up      Outpatient blood pressures are being checked at home.  Results are 194-170/115-98.    Low Salt Diet: low salt      Amount of exercise or physical activity: None but walking 1-3 days a week     Problems taking medications regularly: No    Medication side effects: none    Diet: regular (no restrictions)      Acute Illness   Acute illness concerns: Bronchitis   Onset: 2 weeks but started coughing recently     Fever: no    Chills/Sweats: no    Headache (location?): YES    Sinus Pressure:no    Conjunctivitis:  no    Ear Pain: no    Rhinorrhea: no    Congestion: YES    Sore Throat: YES- but getting better      Cough: YES-productive of yellow/brown sputum (feels like tasting blood)     Wheeze: YES    Decreased Appetite: no    Nausea: YES    Vomiting: no    Diarrhea:  No but constipated     Dysuria/Freq.: no    Fatigue/Achiness: YES    Sick/Strep Exposure: no     Therapies Tried and outcome: Mucenix, muscle relaxer, tylenol, and oxycodone for headaches           Problem list and histories reviewed & adjusted, as indicated.  Additional history: as documented    Patient Active Problem List   Diagnosis     Major depressive disorder, recurrent episode (H)     Intermittent asthma     Hyperlipidemia LDL goal <130     Hypertension goal BP (blood pressure) < 140/90     Chronic nonallergic rhinitis     History of diverticulitis of colon     Obesity (BMI 30-39.9)     Health Care Home     PE (pulmonary embolism)     Diverticulosis     GERD (gastroesophageal reflux disease)     Anxiety     LLOYD (obstructive sleep apnea)- mild (AHI 11)     Intracranial arachnoid cyst     Facet arthritis of cervical region (H)     Acquired hypothyroidism     Bipolar 2 disorder (H)     Allergic rhinitis, unspecified allergic rhinitis type     Chronic midline low back pain without sciatica     Irritable bowel  syndrome with diarrhea     B12 deficiency     Impaired glucose tolerance     Essential hypertension with goal blood pressure less than 140/90     Psychophysiological insomnia     Chronic pain syndrome     Ankylosing spondylitis of sacral region (H)     Morbid obesity due to hypertriglyceridemia (H)     Fatty infiltration of liver     Ankylosing spondylitis lumbar region (H)     Sacroiliac joint dysfunction     Past Surgical History:   Procedure Laterality Date     BACK SURGERY  10/07    lumbar discectomy L5-S1     COLONOSCOPY      Note: colonoscopy scheduled with [facility] on Friday, 9/4/15     COSMETIC SURGERY  2012    Nose Exterior - functional     GI SURGERY  August 2013    Sigmoidectomy     HERNIA REPAIR, UMBILICAL  8/23/11    hen[place], Dr. Evan Beavers     INCISION AND DRAINAGE, ABSCESS, COMPLEX  8/23/11    umbilical, Dr. Evan Beavers     LAPAROSCOPIC ASSISTED COLECTOMY LEFT (DESCENDING)  8/15/2013    Procedure: LAPAROSCOPIC ASSISTED COLECTOMY LEFT (DESCENDING);  Laparoscopic Hand Assisted Sigmoid Resection, Mobilization of Splenic Fissure, coloproctoscopy, *Latex Free Room* Anesthesia General with Pain block  ;  Surgeon: Aurora Justice MD;  Location: [facility] OR     NERVE SURGERY  8/18/11    RF ablation @ L3-S1 @ [facility]     RECONSTRUCT NOSE AND SEPTUM (FUNCTIONAL)  10/14/2011    Procedure:RECONSTRUCT NOSE AND SEPTUM (FUNCTIONAL); Functional Septorhinoplasty, Turbinate Reduction, ; Surgeon:CEDRIC CUEVAS; Location:[facility] OR     SINUS SURGERY  10/1/01    ethmoidectomy chronic sinusitis       Social History   Substance Use Topics     Smoking status: Never Smoker     Smokeless tobacco: Never Used     Alcohol use No     Family History   Problem Relation Age of Onset     Musculoskeletal Disorder Mother      back     Anxiety Disorder Mother      Colon Polyps Mother      Ulcerative Colitis Mother      and ischemic small intestine, surgery     Hypertension Mother      Breast Cancer Mother      Osteoporosis Mother      Diabetes  "Mother      Type 2, Diagnosed in 2014     Depression Mother      Takes Cymbalta to help with chronic pain + depx     Thyroid Disease Mother      Hypothyroidism     Obesity Mother      Under much better control latter half of 2015     Musculoskeletal Disorder Father      back     Substance Abuse Father      Hypertension Father      Hyperlipidemia Father      Depression Father      Off meds for many years. Seems \"ok\"     HEART DISEASE Maternal Grandmother      HEART DISEASE Maternal Grandfather      Psychotic Disorder Paternal Grandfather      Suicide Paternal Grandfather      Depression Paternal Grandfather      Pediatrician. Committed suicide by pistol in 1990.     Musculoskeletal Disorder Brother      back     Depression Brother      Expressed as anger and moodiness     Substance Abuse Sister      Depression Sister      Mental Health Therapist, yet no anti-depressants?     Anxiety Disorder Sister      Mental Health Therapist, yet no anti-anxiety meds?     Other Cancer Other      Bladder Cancer - Fatal     Substance Abuse Brother      Colon Cancer No family hx of      Crohn Disease No family hx of      Anesthesia Reaction No family hx of      Cancer No family hx of      No family history of skin cancer         Current Outpatient Prescriptions   Medication Sig Dispense Refill     acetaminophen 500 MG CAPS Take 500 mg by mouth every 4 hours as needed 60 capsule      albuterol (2.5 MG/3ML) 0.083% neb solution Inhale 2.5 mg into the lungs       albuterol (PROAIR HFA/PROVENTIL HFA/VENTOLIN HFA) 108 (90 BASE) MCG/ACT Inhaler Inhale 2 puffs into the lungs every 4 hours as needed       ALPRAZolam (XANAX XR) 1 MG 24 hr tablet Take 1 mg by mouth every morning       atorvastatin (LIPITOR) 40 MG tablet TAKE 1 TABLET (40 MG) BY MOUTH DAILY 90 tablet 1     azithromycin (ZITHROMAX) 250 MG tablet Two tablets first day, then one tablet daily for four days. 6 tablet 0     celecoxib (CELEBREX) 200 MG capsule TAKE 1 CAPSULE BY MOUTH " TWICE DAILY AS NEEDED FORMODERATE PAIN 180 capsule 1     cetirizine (ZYRTEC) 10 MG tablet Take 1 tablet (10 mg) by mouth At Bedtime 30 tablet 11     cyanocobalamin (VITAMIN B12) 1000 MCG/ML injection Inject 1 mL (1,000 mcg) into the muscle every 30 days 10 mL 1     EPINEPHrine (EPIPEN/ADRENACLICK/OR ANY BX GENERIC EQUIV) 0.3 MG/0.3ML injection 2-pack Inject 0.3 mLs (0.3 mg) into the muscle once as needed for anaphylaxis 0.6 mL 3     Ginger, Zingiber officinalis, (GINGER ROOT) 550 MG CAPS capsule Take 550 mg by mouth Up to three times daily       hydrOXYzine (ATARAX) 50 MG tablet Take 50 mg by mouth daily       INFLIXIMAB IV        lamoTRIgine (LAMICTAL) 25 MG tablet Take 200 mg by mouth daily        levothyroxine (SYNTHROID/LEVOTHROID) 75 MCG tablet TAKE 1 TABLET (75 MCG) BY MOUTH EVERY MORNING. 90 tablet 0     loperamide (IMODIUM) 2 MG capsule Take 2 mg by mouth 4 times daily as needed for diarrhea       metoprolol succinate (TOPROL-XL) 200 MG 24 hr tablet Take 1 tablet (200 mg) by mouth daily 90 tablet 1     montelukast (SINGULAIR) 10 MG tablet Take 10 mg by mouth       omega-3 acid ethyl esters (LOVAZA) 1 g capsule TAKE 2 CAPSULES BY MOUTH TWICE DAILY. 360 capsule 1     omeprazole 20 MG tablet Take 20 mg by mouth daily        order for DME Respironics REMSTAR 60 Series Auto CPAP 10-12 cm H2O, Wisp nasal mask w/a large cushion and a chinstrap       order for DME SI-loc belt 1 Units 0     order for DME Equipment being ordered: 1 ml tuberculin syringes to be used for Vitamin B12 injections. 12 each 1     oxyCODONE IR (ROXICODONE) 5 MG tablet Take 1-2 tablets (5-10 mg) by mouth every 6 hours as needed for pain (maximum 4 tablet(s) per day) 35 tablet 0     predniSONE (DELTASONE) 20 MG tablet Take 2 tablets (40 mg) by mouth daily for 5 days 10 tablet 0     pregabalin (LYRICA) 50 MG capsule Take 1 capsule (50 mg) by mouth 2 times daily 60 capsule 1     ramipril (ALTACE) 10 MG capsule Take 1 capsule (10 mg) by mouth  daily 90 capsule 1     risperiDONE (RISPERDAL) 1 MG tablet Take 1 mg by mouth daily       tiZANidine (ZANAFLEX) 4 MG tablet TAKE 1 TABLET BY MOUTH THREE TIMES DAILY AS NEEDED 90 tablet 4     vitamin D3 (CHOLECALCIFEROL) 81242 UNITS capsule Take one capsule every two weeks. 24 capsule 1     pramipexole (MIRAPEX) 0.5 MG tablet Take 1/2 tab by mouth daily. Every 3 days, increase by 1/2 tab until taking a total of 4 tabs daily.       Allergies   Allergen Reactions     Banana Shortness Of Breath     Pt reports organic Banana is okay.      Nitroglycerin Palpitations     Penicillins Anaphylaxis     Provigil [Modafinil] Shortness Of Breath     headache     Ciprofloxacin Palpitations, Other (See Comments) and Rash     dizziness (versus metronidazole taken at same time)     Gadolinium Hives and Itching     Patient was premedicated for the contrast allergy. He did still have a reaction a few hours after injection. Hives and itching. Dr. Gomez told tech to inform pt he should only have contrast again in the future when premedicated and at a hospital. Not at an outpatient facility.      Dulera Other (See Comments)     Hoarse voice     Dye [Contrast Dye] Other (See Comments) and Hives     Moderate flushing, CT contrast     Levaquin Other (See Comments)     tendonitis     Levofloxacin      Tendonitis  Tendonitis     Neurontin [Gabapentin] Hives     Moderate hives     Nortriptyline Hives     Varicella Virus Vaccine Live      Rash     Ciprofloxacin Palpitations     Flagyl [Metronidazole Hcl] Palpitations and Hives     Latex Rash     Metronidazole Palpitations, Other (See Comments) and Rash     dizziness (versus ciprofloxacin taken at same time)     No Clinical Screening - See Comments Rash     Nitrile gloves       Reviewed and updated as needed this visit by clinical staff  Tobacco  Allergies  Meds  Med Hx  Surg Hx  Fam Hx  Soc Hx      Reviewed and updated as needed this visit by Provider         ROS:  Constitutional,  "HEENT, cardiovascular, pulmonary, GI, , musculoskeletal, neuro, skin, endocrine and psych systems are negative, except as otherwise noted.    OBJECTIVE:     BP (!) 138/98 (BP Location: Right arm, Patient Position: Sitting, Cuff Size: Adult Large)  Pulse 89  Temp 98.8  F (37.1  C) (Oral)  Resp 24  Ht 6' 5\" (1.956 m)  Wt (!) 340 lb 1.6 oz (154.3 kg)  SpO2 98%  BMI 40.33 kg/m2  Body mass index is 40.33 kg/(m^2).  GENERAL: healthy, alert and no distress  EYES: Eyes grossly normal to inspection, PERRL and conjunctivae and sclerae normal  HENT: ear canals and TM's normal, nose and mouth without ulcers or lesions  NECK: no adenopathy, no asymmetry, masses, or scars and thyroid normal to palpation  RESP: no rales , no rhonchi and expiratory wheezes throughout  CV: regular rate and rhythm, normal S1 S2, no S3 or S4, no murmur, click or rub, no peripheral edema and peripheral pulses strong  ABDOMEN: soft, nontender, no hepatosplenomegaly, no masses and bowel sounds normal  MS: no gross musculoskeletal defects noted, no edema    Diagnostic Test Results:  none     ASSESSMENT/PLAN:       ICD-10-CM    1. Mild intermittent asthma with acute exacerbation J45.21 predniSONE (DELTASONE) 20 MG tablet     azithromycin (ZITHROMAX) 250 MG tablet   2. Hypertension goal BP (blood pressure) < 140/90 I10 ramipril (ALTACE) 10 MG capsule   3. Screening for HIV (human immunodeficiency virus) Z11.4      1.Azithromycin 2 tablets today then 1 tablet daily for 4 more days   Prednisone 2 tablets daily for 5 days   Mucinex DM 1 tablet twice a day for 10 days   Follow up if not better after antibiotic or sooner as needed     2.Increase Altace to 10 mg daily     Follow up in 2 weeks       Kanchan Mendoza PA-C  [facility]  "

## 2018-08-28 NOTE — PATIENT INSTRUCTIONS
Azithromycin 2 tablets today then 1 tablet daily for 4 more days   Prednisone 2 tablets daily for 5 days   Mucinex DM 1 tablet twice a day for 10 days     Increase Altace to 10 mg daily     Follow up in 2 weeks

## 2018-08-28 NOTE — MR AVS SNAPSHOT
After Visit Summary   8/28/2018    Joel Pineda    MRN: [id number]           Patient Information     Date Of Birth          1975        Visit Information        Provider Department      8/28/2018 3:40 PM Kanchan Mendoza PA-C [facility]        Today's Diagnoses     Mild intermittent asthma with acute exacerbation    -  1    Screening for HIV (human immunodeficiency virus)        Hypertension goal BP (blood pressure) < 140/90        Essential hypertension with goal blood pressure less than 140/90          Care Instructions    Azithromycin 2 tablets today then 1 tablet daily for 4 more days   Prednisone 2 tablets daily for 5 days   Mucinex DM 1 tablet twice a day for 10 days     Increase Altace to 10 mg daily     Follow up in 2 weeks             Follow-ups after your visit        Your next 10 appointments already scheduled     Sep 19, 2018  2:40 PM CDT   [facility] with [facility]il Baxter MD   [facility]    [address] [place] [id number]   [phone]            Oct 16, 2018  1:30 PM CDT   Level 3 with [address] ([facility])    [id number] [place] [id number] [phone]            Oct 16, 2018  2:00 PM CDT   SHORT with Radha Mcdonald [facility] [place] [place]    [address] [place] [address] N  [facility] [[email or web address]] 1c2  [facility] [id number] [phone]              Who to contact     If you have questions or need follow up information about today's clinic visit or your schedule please contact [facility] directly at [phone].  Normal or non-critical lab and imaging results will be communicated to you by MyChart, letter or phone within 4 business days after the clinic has received the results. If you do not hear from us within 7 days, please contact the clinic through MyChart or phone. If you  "have a critical or abnormal lab result, we will notify you by phone as soon as possible.  Submit refill requests through [employer] or call your pharmacy and they will forward the refill request to us. Please allow 3 business days for your refill to be completed.          Additional Information About Your Visit        HowStuffWorkshart Information     [employer] gives you secure access to your electronic health record. If you see a primary care provider, you can also send messages to your care team and make appointments. If you have questions, please call your primary care clinic.  If you do not have a primary care provider, please call [phone] and they will assist you.        Care EveryWhere ID     This is your Care EveryWhere ID. This could be used by other organizations to access your [place] medical records  [id number]        Your Vitals Were     Pulse Temperature Respirations Height Pulse Oximetry BMI (Body Mass Index)    89 98.8  F (37.1  C) (Oral) 24 6' 5\" (1.956 m) 98% 40.33 kg/m2       Blood Pressure from Last 3 Encounters:   08/28/18 (!) 138/98   08/21/18 124/83   08/21/18 148/90    Weight from Last 3 Encounters:   08/28/18 (!) 340 lb 1.6 oz (154.3 kg)   08/21/18 (!) 340 lb 6.4 oz (154.4 kg)   08/21/18 (!) 345 lb (156.5 kg)              Today, you had the following     No orders found for display         Today's Medication Changes          These changes are accurate as of 8/28/18  4:11 PM.  If you have any questions, ask your nurse or doctor.               Start taking these medicines.        Dose/Directions    azithromycin 250 MG tablet   Commonly known as:  ZITHROMAX   Used for:  Mild intermittent asthma with acute exacerbation   Started by:  Kanchan Mendoza PA-C        Two tablets first day, then one tablet daily for four days.   Quantity:  6 tablet   Refills:  0       predniSONE 20 MG tablet   Commonly known as:  DELTASONE   Used for:  Mild intermittent asthma with acute exacerbation   Started " by:  Kanchan Mendoza PA-C        Dose:  40 mg   Take 2 tablets (40 mg) by mouth daily for 5 days   Quantity:  10 tablet   Refills:  0         These medicines have changed or have updated prescriptions.        Dose/Directions    ramipril 10 MG capsule   Commonly known as:  ALTACE   This may have changed:    - medication strength  - See the new instructions.   Used for:  Hypertension goal BP (blood pressure) < 140/90   Changed by:  Kanchan Mendoza PA-C        Dose:  10 mg   Take 1 capsule (10 mg) by mouth daily   Quantity:  90 capsule   Refills:  1            Where to get your medicines      These medications were sent to [facility] [id number] [facility] [address] [facility]  [address] [facility] [address]     Phone:  [phone]     azithromycin 250 MG tablet    predniSONE 20 MG tablet    ramipril 10 MG capsule                Primary Care Provider Office Phone # Fax #    Ksenia Shady Lyles [phone] [address] [facility] [address]        Goals        General    I will continue to attend Psychiatry appointments for medication management, 1/14/2014 (pt-stated)     Notes - Note edited  8/24/2016  4:04 PM by Dalila Cavazos LSW    As of today's date 8/24/2016 goal is met at 51 - 75%.   Goal Status:  Ongoing Sees  Therapist every other week and Psych PA every 3 weeks.  As of today's date 5/25/2016 goal is met at 51 - 75%.   Goal Status:  Showing progress-  Meeting with Psych PA for second time tomorrow  To review meds As of today's date 4/11/2016 goal is met at 51 - 75%.   Goal Status:  Showing progress  As of today's date 1/14/2014 goal is met at 51 - 75%.   Goal Status:  Active        I will schedule therapy with new counselor, 1/14/2014 (pt-stated)     Notes - Note edited  5/25/2016  1:54 PM by Dalila Cavazos LSW    As of today's date 5/25/2016 goal is met at 76 - 100%.   Goal Status:  Ongoing New therapist and Psych  PA on a regular basis  As of today's date 5/10/2016 goal is met at 76 - 100%.   Goal Status:  Ongoing met with new therapist at [facility]  As of today's date 4/11/2016 goal is met at 51 - 75%.   Goal Status:  Ongoing meets with therapist regularly  As of today's date 1/14/2014 goal is met at 0 - 25%.   Goal Status:  Active        Psychosocial I need some help with cleaning (pt-stated)     Notes - Note edited  6/23/2016  1:57 PM by Dalila Cavazos LSW    As of today's date 6/23/2016 goal is met at 26 - 50%.   Goal Status:  Showing progress met with [employer]. Has not yet made a decision  As of today's date 5/25/2016 goal is met at 0 - 25%.   Goal Status:  Ongoing agency had to reschedule appt.  As of today's date 5/10/2016 goal is met at 0 - 25%.   Goal Status:  Active referral to [facility]        Equal Access to Services     ANNA CANO AH: Hadii floyd ku hadasho Soomaali, waaxda luqadaha, qaybta kaalmada adeegyada, isabell shankar . So [facility] [phone].    ATENCIÓN: Si habla español, tiene a faustin disposición servicios gratuitos de asistencia lingüística. Llame al [phone].    We comply with applicable federal civil rights laws and [place] laws. We do not discriminate on the basis of race, color, national origin, age, disability, sex, sexual orientation, or gender identity.            Thank you!     Thank you for choosing [facility]  for your care. Our goal is always to provide you with excellent care. Hearing back from our patients is one way we can continue to improve our services. Please take a few minutes to complete the written survey that you may receive in the mail after your visit with us. Thank you!             Your Updated Medication List - Protect others around you: Learn how to safely use, store and throw away your medicines at www.disposemymeds.org.          This list is accurate as of 8/28/18  4:11 PM.  Always use your most recent med list.                   Brand  Name Dispense Instructions for use Diagnosis    acetaminophen 500 MG Caps     60 capsule    Take 500 mg by mouth every 4 hours as needed        * albuterol 108 (90 Base) MCG/ACT inhaler    PROAIR HFA/PROVENTIL HFA/VENTOLIN HFA     Inhale 2 puffs into the lungs every 4 hours as needed        * albuterol (2.5 MG/3ML) 0.083% neb solution      Inhale 2.5 mg into the lungs        ALPRAZolam 1 MG 24 hr tablet    XANAX XR     Take 1 mg by mouth every morning        atorvastatin 40 MG tablet    LIPITOR    90 tablet    TAKE 1 TABLET (40 MG) BY MOUTH DAILY    Mixed hyperlipidemia       azithromycin 250 MG tablet    ZITHROMAX    6 tablet    Two tablets first day, then one tablet daily for four days.    Mild intermittent asthma with acute exacerbation       celecoxib 200 MG capsule    celeBREX    180 capsule    TAKE 1 CAPSULE BY MOUTH TWICE DAILY AS NEEDED FORMODERATE PAIN    Chronic midline low back pain without sciatica       cetirizine 10 MG tablet    zyrTEC    30 tablet    Take 1 tablet (10 mg) by mouth At Bedtime    Cough       cyanocobalamin 1000 MCG/ML injection    VITAMIN B12    10 mL    Inject 1 mL (1,000 mcg) into the muscle every 30 days    B12 deficiency       EPINEPHrine 0.3 MG/0.3ML injection 2-pack    EPIPEN/ADRENACLICK/or ANY BX GENERIC EQUIV    0.6 mL    Inject 0.3 mLs (0.3 mg) into the muscle once as needed for anaphylaxis    Food allergy       ginger root 550 MG Caps capsule      Take 550 mg by mouth Up to three times daily        hydrOXYzine 50 MG tablet    ATARAX     Take 50 mg by mouth daily        INFLIXIMAB IV           lamoTRIgine 25 MG tablet    LaMICtal     Take 200 mg by mouth daily        levothyroxine 75 MCG tablet    SYNTHROID/LEVOTHROID    90 tablet    TAKE 1 TABLET (75 MCG) BY MOUTH EVERY MORNING.    Acquired hypothyroidism       loperamide 2 MG capsule    IMODIUM     Take 2 mg by mouth 4 times daily as needed for diarrhea        metoprolol succinate 200 MG 24 hr tablet    TOPROL-XL    90 tablet     Take 1 tablet (200 mg) by mouth daily    Essential hypertension with goal blood pressure less than 140/90       montelukast 10 MG tablet    SINGULAIR     Take 10 mg by mouth        omega-3 acid ethyl esters 1 g capsule    Lovaza    360 capsule    TAKE 2 CAPSULES BY MOUTH TWICE DAILY.    Hypertriglyceridemia       omeprazole 20 MG tablet      Take 20 mg by mouth daily        order for DME     12 each    Equipment being ordered: 1 ml tuberculin syringes to be used for Vitamin B12 injections.    Vitamin B12 deficiency (non anaemic)       order for DME     1 Units    SI-loc belt    SI (sacroiliac) joint dysfunction       order for DME      Respironics REMSTAR 60 Series Auto CPAP 10-12 cm H2O, Wisp nasal mask w/a large cushion and a chinstrap        oxyCODONE IR 5 MG tablet    ROXICODONE    35 tablet    Take 1-2 tablets (5-10 mg) by mouth every 6 hours as needed for pain (maximum 4 tablet(s) per day)    Facet arthropathy       pramipexole 0.5 MG tablet    MIRAPEX     Take 1/2 tab by mouth daily. Every 3 days, increase by 1/2 tab until taking a total of 4 tabs daily.        predniSONE 20 MG tablet    DELTASONE    10 tablet    Take 2 tablets (40 mg) by mouth daily for 5 days    Mild intermittent asthma with acute exacerbation       pregabalin 50 MG capsule    LYRICA    60 capsule    Take 1 capsule (50 mg) by mouth 2 times daily    Chronic midline low back pain without sciatica       ramipril 10 MG capsule    ALTACE    90 capsule    Take 1 capsule (10 mg) by mouth daily    Hypertension goal BP (blood pressure) < 140/90       risperiDONE 1 MG tablet    risperDAL     Take 1 mg by mouth daily        tiZANidine 4 MG tablet    ZANAFLEX    90 tablet    TAKE 1 TABLET BY MOUTH THREE TIMES DAILY AS NEEDED    Dorsalgia       vitamin D3 31093 UNITS capsule    CHOLECALCIFEROL    24 capsule    Take one capsule every two weeks.    Vitamin D deficiency       * Notice:  This list has 2 medication(s) that are the same as other  medications prescribed for you. Read the directions carefully, and ask your doctor or other care provider to review them with you.

## 2018-08-29 ASSESSMENT — PATIENT HEALTH QUESTIONNAIRE - PHQ9: SUM OF ALL RESPONSES TO PHQ QUESTIONS 1-9: 15

## 2018-08-29 ASSESSMENT — ASTHMA QUESTIONNAIRES: ACT_TOTALSCORE: 16

## 2018-08-30 ENCOUNTER — MYC REFILL (OUTPATIENT)
Dept: FAMILY MEDICINE | Facility: CLINIC | Age: 43
End: 2018-08-30

## 2018-08-30 DIAGNOSIS — J45.21 MILD INTERMITTENT ASTHMA WITH ACUTE EXACERBATION: Chronic | ICD-10-CM

## 2018-08-30 RX ORDER — AZITHROMYCIN 250 MG/1
TABLET, FILM COATED ORAL
Qty: 6 TABLET | Refills: 0 | Status: CANCELLED | OUTPATIENT
Start: 2018-08-30

## 2018-08-30 NOTE — TELEPHONE ENCOUNTER
Message from [employer]:  Original authorizing provider: SHERWIN Rao would like a refill of the following medications:  azithromycin (ZITHROMAX) 250 MG tablet [Kanchan Mendoza PA-C]    Preferred pharmacy: [facility] [id number] [id number]    Comment:  My bronchitis is still a big problem, and in addition I now have acute sinusitis. I thought it important to request a refill before the weekend. Thanks.

## 2018-08-30 NOTE — TELEPHONE ENCOUNTER
Patient was called at 6:15 pm  Patient reports that he has green sinus drainage an when he coughs up mucous he has wheezing, but Neb and inhaler help with that.   Patient started to take medications 2 days ago. He needs to it a time to work or if he feels that he is getting worse he needs to be seen in urgent care as soon as possible. .    Patient currently on Zpack,   Prednisone  Mucinex DM   Albuterol nebs and inhaler every 4 hours as needed     Patient agreed with the plan and reported understanding.   Letty Mendoza PAC

## 2018-08-30 NOTE — TELEPHONE ENCOUNTER
Patient was SOB on the phone.  Patient has thick green-brown phlegm.  Nebulizer helps to loosen it, but still has a lot of discharge.  Patient is also taking prednisone and Mucinex and does not see a difference.  Please advise.    Diane Tuttle RN

## 2018-09-03 ENCOUNTER — MYC MEDICAL ADVICE (OUTPATIENT)
Dept: FAMILY MEDICINE | Facility: CLINIC | Age: 43
End: 2018-09-03

## 2018-09-03 DIAGNOSIS — R09.81 NASAL CONGESTION: Primary | ICD-10-CM

## 2018-09-04 ENCOUNTER — OFFICE VISIT (OUTPATIENT)
Dept: PSYCHIATRY | Facility: CLINIC | Age: 43
End: 2018-09-04
Payer: COMMERCIAL

## 2018-09-04 VITALS
WEIGHT: 315 LBS | TEMPERATURE: 97.8 F | BODY MASS INDEX: 40.03 KG/M2 | SYSTOLIC BLOOD PRESSURE: 138 MMHG | HEART RATE: 89 BPM | DIASTOLIC BLOOD PRESSURE: 99 MMHG | RESPIRATION RATE: 16 BRPM

## 2018-09-04 DIAGNOSIS — F33.2 SEVERE EPISODE OF RECURRENT MAJOR DEPRESSIVE DISORDER, WITHOUT PSYCHOTIC FEATURES (H): Primary | ICD-10-CM

## 2018-09-04 ASSESSMENT — PAIN SCALES - GENERAL: PAINLEVEL: MILD PAIN (2)

## 2018-09-04 NOTE — PATIENT INSTRUCTIONS
Preventive Care:    Diabetic Eye Exam Screening: During our visit today, we discussed that it is recommended you receive diabetic eye exam screening. Please call or make an appointment with your primary care provider to discuss this with them. You may also call the [facility] scheduling line ([phone]) to set up an eye exam at one of the [facility].
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
## 2018-09-04 NOTE — MR AVS SNAPSHOT
After Visit Summary   9/4/2018    Joel Pineda    MRN: [id number]           Patient Information     Date Of Birth          1975        Visit Information        Provider Department      9/4/2018 2:00 PM Lynne Mohr MD [facility] Psychiatry        Today's Diagnoses     Severe episode of recurrent major depressive disorder, without psychotic features (H)    -  1      Care Instructions    Preventive Care:    Diabetic Eye Exam Screening: During our visit today, we discussed that it is recommended you receive diabetic eye exam screening. Please call or make an appointment with your primary care provider to discuss this with them. You may also call the [facility] scheduling line ([phone]) to set up an eye exam at one of the [facility]ics.              Follow-ups after your visit        Your next 10 appointments already scheduled     Oct 16, 2018  1:30 PM CDT   Level 3 with [address] ([facility])    [id number] [place] [address] [place]  [facility] [address]   [phone]            Oct 16, 2018  2:00 PM CDT   SHORT with Radha Mcdonald [facility] ([facility])    [id number] [place] [address] [place]  [id number]  [facility] [address] [phone]            Jan 30, 2019  1:20 PM CST   Return Visit with Oneil Baxter MD   [facility] ([facility])    [address] [place] [address]  [place] [address] [phone]              Who to contact     Please call your clinic at [phone] to:    Ask questions about your health    Make or cancel appointments    Discuss your medicines    Learn about your test results    Speak to your doctor            Additional Information About Your Visit        MyChart Information     [employer] gives you secure access to your electronic health record. If you see a primary care provider, you can also send messages to your care team and make appointments. If  you have questions, please call your primary care clinic.  If you do not have a primary care provider, please call [phone] and they will assist you.      [employer] is an electronic gateway that provides easy, online access to your medical records. With [employer], you can request a clinic appointment, read your test results, renew a prescription or communicate with your care team.     To access your existing account, please contact your [facility] or call [phone] for assistance.        Care EveryWhere ID     This is your Care EveryWhere ID. This could be used by other organizations to access your [place] medical records  [id number]        Your Vitals Were     Pulse Temperature Respirations BMI (Body Mass Index)          89 97.8  F (36.6  C) 16 40.03 kg/m2         Blood Pressure from Last 3 Encounters:   09/19/18 112/82   09/04/18 (!) 138/99   08/28/18 (!) 138/98    Weight from Last 3 Encounters:   09/19/18 (!) 155.9 kg (343 lb 9.6 oz)   09/04/18 (!) 153.1 kg (337 lb 9.6 oz)   08/28/18 (!) 154.3 kg (340 lb 1.6 oz)              Today, you had the following     No orders found for display       Primary Care Provider Office Phone # Fax #    Ksenia Shady Lyles [phone] [address] [facility] [address]        Goals        General    I will continue to attend Psychiatry appointments for medication management, 1/14/2014 (pt-stated)     Notes - Note edited  8/24/2016  4:04 PM by Dalila Cavazos LSW    As of today's date 8/24/2016 goal is met at 51 - 75%.   Goal Status:  Ongoing Sees  Therapist every other week and Psych PA every 3 weeks.  As of today's date 5/25/2016 goal is met at 51 - 75%.   Goal Status:  Showing progress-  Meeting with Psych PA for second time tomorrow  To review meds As of today's date 4/11/2016 goal is met at 51 - 75%.   Goal Status:  Showing progress  As of today's date 1/14/2014 goal is met at 51 - 75%.   Goal Status:   Active        I will schedule therapy with new counselor, 1/14/2014 (pt-stated)     Notes - Note edited  5/25/2016  1:54 PM by Dalila Cavazos LSW    As of today's date 5/25/2016 goal is met at 76 - 100%.   Goal Status:  Ongoing New therapist and Psych PA on a regular basis  As of today's date 5/10/2016 goal is met at 76 - 100%.   Goal Status:  Ongoing met with new therapist at [facility]  As of today's date 4/11/2016 goal is met at 51 - 75%.   Goal Status:  Ongoing meets with therapist regularly  As of today's date 1/14/2014 goal is met at 0 - 25%.   Goal Status:  Active        Psychosocial I need some help with cleaning (pt-stated)     Notes - Note edited  6/23/2016  1:57 PM by Dalila Cavazos LSW    As of today's date 6/23/2016 goal is met at 26 - 50%.   Goal Status:  Showing progress met with [facility]. Has not yet made a decision  As of today's date 5/25/2016 goal is met at 0 - 25%.   Goal Status:  Ongoing agency had to reschedule appt.  As of today's date 5/10/2016 goal is met at 0 - 25%.   Goal Status:  Active referral to [facility]        Equal Access to Services     ANNA CANO : Hadii floyd ashley hadasho Sonarinderali, waaxda luqadaha, qaybta kaalmada adeegyada, isabell echevarria. So [facility] [phone].    ATENCIÓN: Si habla español, tiene a faustin disposición servicios gratuitos de asistencia lingüística. Llame al [phone].    We comply with applicable federal civil rights laws and [place] laws. We do not discriminate on the basis of race, color, national origin, age, disability, sex, sexual orientation, or gender identity.            Thank you!     Thank you for choosing [facility]  for your care. Our goal is always to provide you with excellent care. Hearing back from our patients is one way we can continue to improve our services. Please take a few minutes to complete the written survey that you may receive in the mail after your visit with us. Thank you!             Your Updated  Medication List - Protect others around you: Learn how to safely use, store and throw away your medicines at www.disposemymeds.org.          This list is accurate as of 9/4/18 11:59 PM.  Always use your most recent med list.                   Brand Name Dispense Instructions for use Diagnosis    acetaminophen 500 MG Caps     60 capsule    Take 500 mg by mouth every 4 hours as needed        * albuterol 108 (90 Base) MCG/ACT inhaler    PROAIR HFA/PROVENTIL HFA/VENTOLIN HFA     Inhale 2 puffs into the lungs every 4 hours as needed        * albuterol (2.5 MG/3ML) 0.083% neb solution      Inhale 2.5 mg into the lungs        ALPRAZolam 1 MG 24 hr tablet    XANAX XR     Take 1 mg by mouth every morning        atorvastatin 40 MG tablet    LIPITOR    90 tablet    TAKE 1 TABLET (40 MG) BY MOUTH DAILY    Mixed hyperlipidemia       azithromycin 250 MG tablet    ZITHROMAX    6 tablet    Two tablets first day, then one tablet daily for four days.    Mild intermittent asthma with acute exacerbation       celecoxib 200 MG capsule    celeBREX    180 capsule    TAKE 1 CAPSULE BY MOUTH TWICE DAILY AS NEEDED FORMODERATE PAIN    Chronic midline low back pain without sciatica       cetirizine 10 MG tablet    zyrTEC    30 tablet    Take 1 tablet (10 mg) by mouth At Bedtime    Cough       EPINEPHrine 0.3 MG/0.3ML injection 2-pack    EPIPEN/ADRENACLICK/or ANY BX GENERIC EQUIV    0.6 mL    Inject 0.3 mLs (0.3 mg) into the muscle once as needed for anaphylaxis    Food allergy       ginger root 550 MG Caps capsule      Take 550 mg by mouth Up to three times daily        hydrOXYzine 50 MG tablet    ATARAX     Take 50 mg by mouth daily        INFLIXIMAB IV           lamoTRIgine 25 MG tablet    LaMICtal     Take 200 mg by mouth daily        levothyroxine 75 MCG tablet    SYNTHROID/LEVOTHROID    90 tablet    TAKE 1 TABLET (75 MCG) BY MOUTH EVERY MORNING.    Acquired hypothyroidism       loperamide 2 MG capsule    IMODIUM     Take 2 mg by mouth 4  times daily as needed for diarrhea        metoprolol succinate 200 MG 24 hr tablet    TOPROL-XL    90 tablet    Take 1 tablet (200 mg) by mouth daily    Essential hypertension with goal blood pressure less than 140/90       montelukast 10 MG tablet    SINGULAIR     Take 10 mg by mouth        omega-3 acid ethyl esters 1 g capsule    Lovaza    360 capsule    TAKE 2 CAPSULES BY MOUTH TWICE DAILY.    Hypertriglyceridemia       omeprazole 20 MG tablet      Take 20 mg by mouth daily        order for DME     12 each    Equipment being ordered: 1 ml tuberculin syringes to be used for Vitamin B12 injections.    Vitamin B12 deficiency (non anaemic)       order for DME     1 Units    SI-loc belt    SI (sacroiliac) joint dysfunction       order for DME      Respironics REMSTAR 60 Series Auto CPAP 10-12 cm H2O, Wisp nasal mask w/a large cushion and a chinstrap        pramipexole 0.5 MG tablet    MIRAPEX     Take 1/2 tab by mouth daily. Every 3 days, increase by 1/2 tab until taking a total of 4 tabs daily.        pseudoePHEDrine 120 MG 12 hr tablet    SUDAFED    28 tablet    Take 1 tablet (120 mg) by mouth every 12 hours    Nasal congestion       ramipril 10 MG capsule    ALTACE    90 capsule    Take 1 capsule (10 mg) by mouth daily    Hypertension goal BP (blood pressure) < 140/90       risperiDONE 1 MG tablet    risperDAL     Take 0.5 mg by mouth daily        tiZANidine 4 MG tablet    ZANAFLEX    90 tablet    TAKE 1 TABLET BY MOUTH THREE TIMES DAILY AS NEEDED    Dorsalgia       vitamin D3 88590 UNITS capsule    CHOLECALCIFEROL    24 capsule    Take one capsule every two weeks.    Vitamin D deficiency       * Notice:  This list has 2 medication(s) that are the same as other medications prescribed for you. Read the directions carefully, and ask your doctor or other care provider to review them with you.

## 2018-09-05 ENCOUNTER — TRANSFERRED RECORDS (OUTPATIENT)
Dept: HEALTH INFORMATION MANAGEMENT | Facility: CLINIC | Age: 43
End: 2018-09-05

## 2018-09-06 ASSESSMENT — PATIENT HEALTH QUESTIONNAIRE - PHQ9: SUM OF ALL RESPONSES TO PHQ QUESTIONS 1-9: 15

## 2018-09-11 ENCOUNTER — TRANSFERRED RECORDS (OUTPATIENT)
Dept: HEALTH INFORMATION MANAGEMENT | Facility: CLINIC | Age: 43
End: 2018-09-11

## 2018-09-11 ENCOUNTER — TELEPHONE (OUTPATIENT)
Dept: PSYCHIATRY | Facility: CLINIC | Age: 43
End: 2018-09-11

## 2018-09-11 RX ORDER — PSEUDOEPHEDRINE HCL 120 MG/1
120 TABLET, FILM COATED, EXTENDED RELEASE ORAL EVERY 12 HOURS
Qty: 28 TABLET | Refills: 0 | Status: SHIPPED | OUTPATIENT
Start: 2018-09-11 | End: 2020-01-28

## 2018-09-11 NOTE — TELEPHONE ENCOUNTER
PSYCHIATRY CLINIC PHONE INTAKE     SERVICES REQUESTED / INTERESTED IN          Med Management    Presenting Problem and Brief History                              What would you like to be seen for? (brief description):  Diagnosed in 97 for anxiety and depression. That changed to MDD, than Bi-Polar 1 and 2. He has autoimmune disorder causing psychosis (Ankylosing spondylitis of sacral region). The psychiatrist at [facility] feels there is more depression than bi-polar. He is doing infusion injections through [facility] and getting infleximat?, Currently he has disconinued azithromycin (ZITHROMAX) 250 MG tablet, pramipexole (MIRAPEX) 0.5 MG tablet, all other  meds are accurate on the chart. Not noticing side effects except for nausea and mood disregulation. His mood changes if he is having physical health issues such as pain. He is doing DBT therapy. No mariusz or erratic behavior.   Have you received a mental health diagnosis? Yes   Which one (s): Bi-Polar 2 recently  Is there any history of developmental delay?  No   Are you currently seeing a mental health provider?  Yes            Who / month last seen:  Beverly And Liu Cavazos, Therapist, Last seen 9/11/18. Starting DBT class in October. As well as  in [facility]  Do you have mental health records elsewhere?  Yes  Will you sign a release so we can obtain them?  Yes    Have you ever been hospitalized for psychiatric reasons?  Yes  Describe:  4 since 2008. Admitted for suicidal ideatiokn    Do you have current thoughts of self-harm?  No    Do you currently have thoughts of harming others?  No       Substance Use History     Do you have any history of alcohol / illicit drug use?  No  Describe:  NA  Have you ever received treatment for this?  No    Describe:  NA     Social History     Does the patient have a guardian?  Yes    Name / number: NA  Have you had an ACT team in last 12 months?  No  Describe: NA   Do you have any current or past legal  issues?  No  Describe: NA   OK to leave a detailed voicemail?  Yes    Medical/ Surgical History                                   Patient Active Problem List   Diagnosis     Major depressive disorder, recurrent episode (H)     Intermittent asthma     Hyperlipidemia LDL goal <130     Hypertension goal BP (blood pressure) < 140/90     Chronic nonallergic rhinitis     History of diverticulitis of colon     Obesity (BMI 30-39.9)     Health Care Home     PE (pulmonary embolism)     Diverticulosis     GERD (gastroesophageal reflux disease)     Anxiety     LLOYD (obstructive sleep apnea)- mild (AHI 11)     Intracranial arachnoid cyst     Facet arthritis of cervical region (H)     Acquired hypothyroidism     Bipolar 2 disorder (H)     Allergic rhinitis, unspecified allergic rhinitis type     Chronic midline low back pain without sciatica     Irritable bowel syndrome with diarrhea     B12 deficiency     Impaired glucose tolerance     Essential hypertension with goal blood pressure less than 140/90     Psychophysiological insomnia     Chronic pain syndrome     Ankylosing spondylitis of sacral region (H)     Morbid obesity due to hypertriglyceridemia (H)     Fatty infiltration of liver     Ankylosing spondylitis lumbar region (H)     Sacroiliac joint dysfunction          Medications             Current Outpatient Prescriptions   Medication Sig Dispense Refill     acetaminophen 500 MG CAPS Take 500 mg by mouth every 4 hours as needed 60 capsule      albuterol (2.5 MG/3ML) 0.083% neb solution Inhale 2.5 mg into the lungs       albuterol (PROAIR HFA/PROVENTIL HFA/VENTOLIN HFA) 108 (90 BASE) MCG/ACT Inhaler Inhale 2 puffs into the lungs every 4 hours as needed       ALPRAZolam (XANAX XR) 1 MG 24 hr tablet Take 1 mg by mouth every morning       atorvastatin (LIPITOR) 40 MG tablet TAKE 1 TABLET (40 MG) BY MOUTH DAILY 90 tablet 1     azithromycin (ZITHROMAX) 250 MG tablet Two tablets first day, then one tablet daily for four days.  (Patient not taking: Reported on 9/4/2018) 6 tablet 0     celecoxib (CELEBREX) 200 MG capsule TAKE 1 CAPSULE BY MOUTH TWICE DAILY AS NEEDED FORMODERATE PAIN 180 capsule 1     cetirizine (ZYRTEC) 10 MG tablet Take 1 tablet (10 mg) by mouth At Bedtime 30 tablet 11     cyanocobalamin (VITAMIN B12) 1000 MCG/ML injection Inject 1 mL (1,000 mcg) into the muscle every 30 days 10 mL 1     EPINEPHrine (EPIPEN/ADRENACLICK/OR ANY BX GENERIC EQUIV) 0.3 MG/0.3ML injection 2-pack Inject 0.3 mLs (0.3 mg) into the muscle once as needed for anaphylaxis 0.6 mL 3     Ginger, Zingiber officinalis, (GINGER ROOT) 550 MG CAPS capsule Take 550 mg by mouth Up to three times daily       hydrOXYzine (ATARAX) 50 MG tablet Take 50 mg by mouth daily       INFLIXIMAB IV        lamoTRIgine (LAMICTAL) 25 MG tablet Take 200 mg by mouth daily        levothyroxine (SYNTHROID/LEVOTHROID) 75 MCG tablet TAKE 1 TABLET (75 MCG) BY MOUTH EVERY MORNING. 90 tablet 0     loperamide (IMODIUM) 2 MG capsule Take 2 mg by mouth 4 times daily as needed for diarrhea       metoprolol succinate (TOPROL-XL) 200 MG 24 hr tablet Take 1 tablet (200 mg) by mouth daily 90 tablet 1     montelukast (SINGULAIR) 10 MG tablet Take 10 mg by mouth       omega-3 acid ethyl esters (LOVAZA) 1 g capsule TAKE 2 CAPSULES BY MOUTH TWICE DAILY. 360 capsule 1     omeprazole 20 MG tablet Take 20 mg by mouth daily        order for DME Respironics REMSTAR 60 Series Auto CPAP 10-12 cm H2O, Wisp nasal mask w/a large cushion and a chinstrap       order for DME SI-loc belt 1 Units 0     order for DME Equipment being ordered: 1 ml tuberculin syringes to be used for Vitamin B12 injections. 12 each 1     oxyCODONE IR (ROXICODONE) 5 MG tablet Take 1-2 tablets (5-10 mg) by mouth every 6 hours as needed for pain (maximum 4 tablet(s) per day) 35 tablet 0     pramipexole (MIRAPEX) 0.5 MG tablet Take 1/2 tab by mouth daily. Every 3 days, increase by 1/2 tab until taking a total of 4 tabs daily.        pregabalin (LYRICA) 50 MG capsule Take 1 capsule (50 mg) by mouth 2 times daily 60 capsule 1     ramipril (ALTACE) 10 MG capsule Take 1 capsule (10 mg) by mouth daily 90 capsule 1     risperiDONE (RISPERDAL) 1 MG tablet Take 1 mg by mouth daily       tiZANidine (ZANAFLEX) 4 MG tablet TAKE 1 TABLET BY MOUTH THREE TIMES DAILY AS NEEDED 90 tablet 4     vitamin D3 (CHOLECALCIFEROL) 11789 UNITS capsule Take one capsule every two weeks. 24 capsule 1         DISPOSITION      9/11/18 Intake completed. Ready for review. Treatment Resistant depression is a major concern and wants to address this condition. Ok with NP or resident.  Patricia Barnes,

## 2018-09-15 ENCOUNTER — MYC REFILL (OUTPATIENT)
Dept: FAMILY MEDICINE | Facility: CLINIC | Age: 43
End: 2018-09-15

## 2018-09-15 DIAGNOSIS — M47.819 FACET ARTHROPATHY: ICD-10-CM

## 2018-09-15 DIAGNOSIS — G89.4 CHRONIC PAIN SYNDROME: Chronic | ICD-10-CM

## 2018-09-17 NOTE — TELEPHONE ENCOUNTER
Requested Prescriptions   Pending Prescriptions Disp Refills     oxyCODONE IR (ROXICODONE) 5 MG tablet 35 tablet 0     Sig: Take 1-2 tablets (5-10 mg) by mouth every 6 hours as needed for pain (maximum 4 tablet(s) per day)    There is no refill protocol information for this order          oxyCODONE IR (ROXICODONE) 5 MG tablet      Last Written Prescription Date:  8/20/18  Last Fill Quantity: 35,   # refills: 0  Last Office Visit: 8/28/18  Future Office visit:    Next 5 appointments (look out 90 days)     Sep 19, 2018  2:40 PM CDT   Return Visit with Oneil Baxter MD   [facility] ([facility]    [address] [place] [id number] [phone]            Oct 16, 2018  2:00 PM CDT   SHORT with Radha Mcdonald RPH   [facility] [place]    [address] [place] [address] [address] [place] [id number] [phone]                   Routing refill request to provider for review/approval because:  Drug not on the [facility], P or [facility] refill protocol or controlled substance

## 2018-09-17 NOTE — TELEPHONE ENCOUNTER
Message from [employer]:  Original authorizing provider: MD Tito Dickey SONIALopez Pineda would like a refill of the following medications:  oxyCODONE IR (ROXICODONE) 5 MG tablet [Ksenia Lyles MD]    Preferred pharmacy: [facility] -     Comment:

## 2018-09-17 NOTE — TELEPHONE ENCOUNTER
Controlled Substance Refill Request for Oxycodone  Problem List Complete:  Yes   checked in past 3 months?  Yes 9/17/18   [place] reviewed:  reviewed- no concerns    [place]  [email or web address]    Last Written Prescription Date:  8/20/18  Last Fill Quantity: 35,  # refills: 0   Last office visit: 8/28/2018 with prescribing provider:  8/28/18   Future Office Visit:   Next 5 appointments (look out 90 days)     Sep 19, 2018  2:40 PM CDT   Return Visit with Oneil Baxter MD   [facility] ([facility]    [address] [place] [address]   [phone]            Oct 16, 2018  2:00 PM CDT   SHORT with Radha Mcdonald [facility] ([facility]    [address] [place], [place] [address]  [facility] [address]   [phone]                 No CSA on file currently.    Tea Adam RN

## 2018-09-18 RX ORDER — OXYCODONE HYDROCHLORIDE 5 MG/1
5-10 TABLET ORAL EVERY 6 HOURS PRN
Qty: 35 TABLET | Refills: 0 | Status: SHIPPED | OUTPATIENT
Start: 2018-09-18 | End: 2018-10-07

## 2018-09-19 ENCOUNTER — OFFICE VISIT (OUTPATIENT)
Dept: RHEUMATOLOGY | Facility: CLINIC | Age: 43
End: 2018-09-19
Payer: COMMERCIAL

## 2018-09-19 VITALS
OXYGEN SATURATION: 98 % | DIASTOLIC BLOOD PRESSURE: 82 MMHG | HEIGHT: 77 IN | RESPIRATION RATE: 22 BRPM | BODY MASS INDEX: 37.19 KG/M2 | TEMPERATURE: 97.5 F | SYSTOLIC BLOOD PRESSURE: 112 MMHG | HEART RATE: 72 BPM | WEIGHT: 315 LBS

## 2018-09-19 DIAGNOSIS — M45.8 ANKYLOSING SPONDYLITIS OF SACRAL REGION (H): Primary | ICD-10-CM

## 2018-09-19 DIAGNOSIS — Z79.899 HIGH RISK MEDICATIONS (NOT ANTICOAGULANTS) LONG-TERM USE: ICD-10-CM

## 2018-09-19 LAB
ALBUMIN SERPL-MCNC: 3.8 G/DL (ref 3.4–5)
ALP SERPL-CCNC: 107 U/L (ref 40–150)
ALT SERPL W P-5'-P-CCNC: 58 U/L (ref 0–70)
ANION GAP SERPL CALCULATED.3IONS-SCNC: 9 MMOL/L (ref 3–14)
AST SERPL W P-5'-P-CCNC: 59 U/L (ref 0–45)
BASOPHILS # BLD AUTO: 0.1 10E9/L (ref 0–0.2)
BASOPHILS NFR BLD AUTO: 0.9 %
BILIRUB SERPL-MCNC: 0.6 MG/DL (ref 0.2–1.3)
BUN SERPL-MCNC: 10 MG/DL (ref 7–30)
CALCIUM SERPL-MCNC: 9.7 MG/DL (ref 8.5–10.1)
CHLORIDE SERPL-SCNC: 106 MMOL/L (ref 94–109)
CO2 SERPL-SCNC: 26 MMOL/L (ref 20–32)
CREAT SERPL-MCNC: 0.96 MG/DL (ref 0.66–1.25)
DIFFERENTIAL METHOD BLD: NORMAL
EOSINOPHIL # BLD AUTO: 0.2 10E9/L (ref 0–0.7)
EOSINOPHIL NFR BLD AUTO: 2.3 %
ERYTHROCYTE [DISTWIDTH] IN BLOOD BY AUTOMATED COUNT: 14.3 % (ref 10–15)
GFR SERPL CREATININE-BSD FRML MDRD: 85 ML/MIN/1.7M2
GLUCOSE SERPL-MCNC: 135 MG/DL (ref 70–99)
HCT VFR BLD AUTO: 44.9 % (ref 40–53)
HGB BLD-MCNC: 15.3 G/DL (ref 13.3–17.7)
LYMPHOCYTES # BLD AUTO: 4.2 10E9/L (ref 0.8–5.3)
LYMPHOCYTES NFR BLD AUTO: 40.4 %
MCH RBC QN AUTO: 30.4 PG (ref 26.5–33)
MCHC RBC AUTO-ENTMCNC: 34.1 G/DL (ref 31.5–36.5)
MCV RBC AUTO: 89 FL (ref 78–100)
MONOCYTES # BLD AUTO: 1.3 10E9/L (ref 0–1.3)
MONOCYTES NFR BLD AUTO: 12.1 %
NEUTROPHILS # BLD AUTO: 4.7 10E9/L (ref 1.6–8.3)
NEUTROPHILS NFR BLD AUTO: 44.3 %
PLATELET # BLD AUTO: 239 10E9/L (ref 150–450)
POTASSIUM SERPL-SCNC: 4.6 MMOL/L (ref 3.4–5.3)
PROT SERPL-MCNC: 7.3 G/DL (ref 6.8–8.8)
RBC # BLD AUTO: 5.04 10E12/L (ref 4.4–5.9)
SODIUM SERPL-SCNC: 141 MMOL/L (ref 133–144)
WBC # BLD AUTO: 10.5 10E9/L (ref 4–11)

## 2018-09-19 PROCEDURE — 99213 OFFICE O/P EST LOW 20 MIN: CPT | Performed by: INTERNAL MEDICINE

## 2018-09-19 PROCEDURE — 80053 COMPREHEN METABOLIC PANEL: CPT | Performed by: INTERNAL MEDICINE

## 2018-09-19 PROCEDURE — 36415 COLL VENOUS BLD VENIPUNCTURE: CPT | Performed by: INTERNAL MEDICINE

## 2018-09-19 PROCEDURE — 85025 COMPLETE CBC W/AUTO DIFF WBC: CPT | Performed by: INTERNAL MEDICINE

## 2018-09-19 RX ORDER — METHYLPREDNISOLONE SODIUM SUCCINATE 125 MG/2ML
100 INJECTION, POWDER, LYOPHILIZED, FOR SOLUTION INTRAMUSCULAR; INTRAVENOUS ONCE
Status: CANCELLED | OUTPATIENT
Start: 2018-10-16 | End: 2018-10-16

## 2018-09-19 RX ORDER — BUDESONIDE 0.5 MG/2ML
0.5 INHALANT ORAL 2 TIMES DAILY
COMMUNITY
End: 2018-11-06

## 2018-09-19 NOTE — PROGRESS NOTES
"Rheumatology Clinic Visit      Joel Pineda MRN# [id number]   YOB: 1975 Age: 43 year old      Date of visit: 9/19/18   PCP: Dr. Ksenia Lyles    Chief Complaint   Patient presents with:  Follow Up For: Rx monitoring      Assessment and Plan     1.  Ankylosing spondylitis: Many years of inflammatory back pain but no clear sacroiliitis seen on x-rays or MRIs; then had a lumbar spine x-ray on 2/23/2018 at [facility] showing \"Moderate L5-S1 and mild L4-5 degenerative disc disease and degenerative facet arthropathy. No evidence for fracture, subluxation, or spondylolisthesis. Chronic bridging enthesophyte across the lower right SI joint with irregularity of the joint space suspicious for sequelae of chronic inflammatory sacroiliitis. Correlate clinically.\"  This is complicated by a history of back surgery and degenerative changes.  HLA-B27 positive per record review but the actual lab report has not been seen.  He has a personal history of diverticulitis requiring sigmoidoscopy.  Reportedly his mother has ulcerative colitis. Based on his symptoms, the x-ray results, and HLA-B27 positive, it is most likely ankylosing spondylitis and Remicade was started with improvement of lower back pain and resolution of lower back stiffness.  He requires premedications with Remicade, but would like to reduce methylpred to 100mg from 125mg, and take off tylenol since he takes it in the AM.  Dong well and will continue.  - Continue remicade 5mg/kg IV every 8 weeks (started 3/19/2018)  - Labs: CBC, CMP    Mr. Pineda verbalized agreement with and understanding of the rational for the diagnosis and treatment plan.  All questions were answered to best of my ability and the patient's satisfaction. Mr. Pineda was advised to contact the clinic with any questions that may arise after the clinic visit.      Thank you for involving me in the care of the patient    Return to clinic: 4 months      HPI   Joel Pineda is a 43 year old male " with a past medical history significant for hypertension, hyperlipidemia, asthma, history of pulmonary embolism, history of diverticulitis requiring sigmoidectomy, GERD, obstructive sleep apnea, bipolar disorder, hypothyroidism, B12 deficiency, CKD? (reportedly issue with contrast) and chronic pain syndrome who presents for follow-up of ankylosing spondylitis.    Today, Mr. Pineda reports that he is doing well with Remicade infusions.  He feels like they are helping his back pain still.  No morning stiffness.  Mild gelling phenomenon that resolves quickly.  He would like to reduce the methylprednisolone premedication from 125 mg to 100 mg because he is finding it harder to sleep after getting his infusions.  He would also like to remove Tylenol from the pain medication list because he takes Tylenol in the morning.  He would like to keep Benadryl on the pre-medication list.  Continuing to stay active.     Denies fevers, chills, nausea, vomiting, constipation, diarrhea. No abdominal pain. No chest pain/pressure, palpitations, or shortness of breath. No LE swelling. No neck pain. No oral or nasal sores.  No rash. No sicca symptoms. No photosensitivity or photophobia. No eye pain or redness. No history of inflammatory eye disease.  No history of Raynaud's Phenomenon.  No seizure history.  No known renal disorder.      Hx of sigmoidectomy for diverticulitis    Pulmonary emboli a couple years ago; tx'd with warfarin for 6 mo.    Mother: ulcerative colitis    Tobacco: None  EtOH: None  Drugs: None    ROS   GEN: No fevers, chills, night sweats, or weight change  SKIN: No itching, rashes, sores  HEENT: No oral or nasal ulcers.  CV: No chest pain, pressure, palpitations, or dyspnea on exertion.  PULM: No SOB, wheeze, cough.  GI: No nausea, vomiting, constipation, diarrhea. No blood in stool. No abdominal pain.  : No blood in urine.  MSK: See HPI.  NEURO: No numbness or tingling  EXT: No LE swelling  PSYCH: See HPI    Active  Problem List     Patient Active Problem List   Diagnosis     Major depressive disorder, recurrent episode (H)     Intermittent asthma     Hyperlipidemia LDL goal <130     Hypertension goal BP (blood pressure) < 140/90     Chronic nonallergic rhinitis     History of diverticulitis of colon     Obesity (BMI 30-39.9)     Health Care Home     PE (pulmonary embolism)     Diverticulosis     GERD (gastroesophageal reflux disease)     Anxiety     LLOYD (obstructive sleep apnea)- mild (AHI 11)     Intracranial arachnoid cyst     Facet arthritis of cervical region (H)     Acquired hypothyroidism     Bipolar 2 disorder (H)     Allergic rhinitis, unspecified allergic rhinitis type     Chronic midline low back pain without sciatica     Irritable bowel syndrome with diarrhea     B12 deficiency     Impaired glucose tolerance     Essential hypertension with goal blood pressure less than 140/90     Psychophysiological insomnia     Chronic pain syndrome     Ankylosing spondylitis of sacral region (H)     Morbid obesity due to hypertriglyceridemia (H)     Fatty infiltration of liver     Ankylosing spondylitis lumbar region (H)     Sacroiliac joint dysfunction     Past Medical History     Past Medical History:   Diagnosis Date     Acne      Chest pain     Chest pain, regulated w/BP meds. Clear arteries.     Skin exam, screening for cancer 12/3/2013     Past Surgical History     Past Surgical History:   Procedure Laterality Date     BACK SURGERY  10/07    lumbar discectomy L5-S1     COLONOSCOPY      Note: colonoscopy scheduled with [facility] on Friday, 9/4/15     COSMETIC SURGERY  2012    Nose Exterior - functional     GI SURGERY  August 2013    Sigmoidectomy     HERNIA REPAIR, UMBILICAL  8/23/11    Dr. Evan whiting     INCISION AND DRAINAGE, ABSCESS, COMPLEX  8/23/11    umbilical, Dr. Evan Beavers     LAPAROSCOPIC ASSISTED COLECTOMY LEFT (DESCENDING)  8/15/2013    Procedure: LAPAROSCOPIC ASSISTED COLECTOMY LEFT (DESCENDING);   Laparoscopic Hand Assisted Sigmoid Resection, Mobilization of Splenic Fissure, coloproctoscopy, *Latex Free Room* Anesthesia General with Pain block  ;  Surgeon: Aurora Justice MD;  Location: [facility] OR     NERVE SURGERY  8/18/11    RF ablation @ L3-S1 @ [facility]     RECONSTRUCT NOSE AND SEPTUM (FUNCTIONAL)  10/14/2011    Procedure:RECONSTRUCT NOSE AND SEPTUM (FUNCTIONAL); Functional Septorhinoplasty, Turbinate Reduction, ; Surgeon:CEDRIC CUEVAS; Location:[facility] OR     SINUS SURGERY  10/1/01    ethmoidectomy chronic sinusitis     Allergy     Allergies   Allergen Reactions     Banana Shortness Of Breath     Pt reports organic Banana is okay.      Nitroglycerin Palpitations     Penicillins Anaphylaxis     Provigil [Modafinil] Shortness Of Breath     headache     Ciprofloxacin Palpitations, Other (See Comments) and Rash     dizziness (versus metronidazole taken at same time)     Gadolinium Hives and Itching     Patient was premedicated for the contrast allergy. He did still have a reaction a few hours after injection. Hives and itching. Dr. Gomez told tech to inform pt he should only have contrast again in the future when premedicated and at a hospital. Not at an outpatient facility.      Dulera Other (See Comments)     Hoarse voice     Dye [Contrast Dye] Other (See Comments) and Hives     Moderate flushing, CT contrast     Levaquin Other (See Comments)     tendonitis     Levofloxacin      Tendonitis  Tendonitis     Neurontin [Gabapentin] Hives     Moderate hives     Nortriptyline Hives     Varicella Virus Vaccine Live      Rash     Ciprofloxacin Palpitations     Flagyl [Metronidazole Hcl] Palpitations and Hives     Latex Rash     Metronidazole Palpitations, Other (See Comments) and Rash     dizziness (versus ciprofloxacin taken at same time)     No Clinical Screening - See Comments Rash     Nitrile gloves     Current Medication List     Current Outpatient Prescriptions   Medication Sig     acetaminophen 500 MG CAPS  Take 500 mg by mouth every 4 hours as needed     albuterol (2.5 MG/3ML) 0.083% neb solution Inhale 2.5 mg into the lungs     albuterol (PROAIR HFA/PROVENTIL HFA/VENTOLIN HFA) 108 (90 BASE) MCG/ACT Inhaler Inhale 2 puffs into the lungs every 4 hours as needed     atorvastatin (LIPITOR) 40 MG tablet TAKE 1 TABLET (40 MG) BY MOUTH DAILY     budesonide (PULMICORT) 0.5 MG/2ML neb solution Take 0.5 mg by nebulization 2 times daily     celecoxib (CELEBREX) 200 MG capsule TAKE 1 CAPSULE BY MOUTH TWICE DAILY AS NEEDED FORMODERATE PAIN     cetirizine (ZYRTEC) 10 MG tablet Take 1 tablet (10 mg) by mouth At Bedtime     cyanocobalamin (VITAMIN B12) 1000 MCG/ML injection Inject 1 mL (1,000 mcg) into the muscle every 30 days     EPINEPHrine (EPIPEN/ADRENACLICK/OR ANY BX GENERIC EQUIV) 0.3 MG/0.3ML injection 2-pack Inject 0.3 mLs (0.3 mg) into the muscle once as needed for anaphylaxis     Ginger, Zingiber officinalis, (GINGER ROOT) 550 MG CAPS capsule Take 550 mg by mouth Up to three times daily     hydrOXYzine (ATARAX) 50 MG tablet Take 50 mg by mouth daily     INFLIXIMAB IV      lamoTRIgine (LAMICTAL) 25 MG tablet Take 200 mg by mouth daily      levothyroxine (SYNTHROID/LEVOTHROID) 75 MCG tablet TAKE 1 TABLET (75 MCG) BY MOUTH EVERY MORNING.     loperamide (IMODIUM) 2 MG capsule Take 2 mg by mouth 4 times daily as needed for diarrhea     metoprolol succinate (TOPROL-XL) 200 MG 24 hr tablet Take 1 tablet (200 mg) by mouth daily     montelukast (SINGULAIR) 10 MG tablet Take 10 mg by mouth     omega-3 acid ethyl esters (LOVAZA) 1 g capsule TAKE 2 CAPSULES BY MOUTH TWICE DAILY.     omeprazole 20 MG tablet Take 20 mg by mouth daily      order for DME Respironics REMSTAR 60 Series Auto CPAP 10-12 cm H2O, Wisp nasal mask w/a large cushion and a chinstrap     order for DME SI-loc belt     order for DME Equipment being ordered: 1 ml tuberculin syringes to be used for Vitamin B12 injections.     oxyCODONE IR (ROXICODONE) 5 MG tablet Take  "1-2 tablets (5-10 mg) by mouth every 6 hours as needed for pain (maximum 4 tablet(s) per day)     pregabalin (LYRICA) 50 MG capsule Take 1 capsule (50 mg) by mouth 2 times daily     pseudoePHEDrine (SUDAFED) 120 MG 12 hr tablet Take 1 tablet (120 mg) by mouth every 12 hours     ramipril (ALTACE) 10 MG capsule Take 1 capsule (10 mg) by mouth daily     risperiDONE (RISPERDAL) 1 MG tablet Take 0.5 mg by mouth daily      tiZANidine (ZANAFLEX) 4 MG tablet TAKE 1 TABLET BY MOUTH THREE TIMES DAILY AS NEEDED     vitamin D3 (CHOLECALCIFEROL) 48460 UNITS capsule Take one capsule every two weeks.     ALPRAZolam (XANAX XR) 1 MG 24 hr tablet Take 1 mg by mouth every morning     azithromycin (ZITHROMAX) 250 MG tablet Two tablets first day, then one tablet daily for four days. (Patient not taking: Reported on 9/4/2018)     pramipexole (MIRAPEX) 0.5 MG tablet Take 1/2 tab by mouth daily. Every 3 days, increase by 1/2 tab until taking a total of 4 tabs daily.     No current facility-administered medications for this visit.          Social History   See HPI    Family History     Family History   Problem Relation Age of Onset     Musculoskeletal Disorder Mother      back     Anxiety Disorder Mother      Colon Polyps Mother      Ulcerative Colitis Mother      and ischemic small intestine, surgery     Hypertension Mother      Breast Cancer Mother      Osteoporosis Mother      Diabetes Mother      Type 2, Diagnosed in 2014     Depression Mother      Takes Cymbalta to help with chronic pain + depx     Thyroid Disease Mother      Hypothyroidism     Obesity Mother      Under much better control latter half of 2015     Musculoskeletal Disorder Father      back     Substance Abuse Father      Hypertension Father      Hyperlipidemia Father      Depression Father      Off meds for many years. Seems \"ok\"     HEART DISEASE Maternal Grandmother      HEART DISEASE Maternal Grandfather      Psychotic Disorder Paternal Grandfather      Suicide " "Paternal Grandfather      Depression Paternal Grandfather      Pediatrician. Committed suicide by pistol in 1990.     Musculoskeletal Disorder Brother      back     Depression Brother      Expressed as anger and moodiness     Substance Abuse Sister      Depression Sister      Mental Health Therapist, yet no anti-depressants?     Anxiety Disorder Sister      Mental Health Therapist, yet no anti-anxiety meds?     Other Cancer Other      Bladder Cancer - Fatal     Substance Abuse Brother      Colon Cancer No family hx of      Crohn Disease No family hx of      Anesthesia Reaction No family hx of      Cancer No family hx of      No family history of skin cancer     Physical Exam     Temp Readings from Last 3 Encounters:   09/19/18 97.5  F (36.4  C) (Oral)   09/04/18 97.8  F (36.6  C)   08/28/18 98.8  F (37.1  C) (Oral)     BP Readings from Last 5 Encounters:   09/19/18 112/82   09/04/18 (!) 138/99   08/28/18 (!) 138/98   08/21/18 124/83   08/21/18 148/90     Pulse Readings from Last 1 Encounters:   09/19/18 72     Resp Readings from Last 1 Encounters:   09/19/18 22     Estimated body mass index is 40.75 kg/(m^2) as calculated from the following:    Height as of this encounter: 1.956 m (6' 5\").    Weight as of this encounter: 155.9 kg (343 lb 9.6 oz).    GEN: NAD; obese  HEENT: MMM. No oral lesions. Anicteric, noninjected sclera  CV: S1, S2. RRR. No m/r/g.  PULM: CTA bilaterally. No w/c.  MSK: MCPs, PIPs, DIPs, wrists, elbows, shoulders, knees, ankles, and MTPs without swelling or tenderness to palpation.  Hips nontender to direct palpation but range of motion testing bothered his back.  No dactylitis.  Achilles tendons nontender to palpation.  Lumbar spine diffusely tender to palpation.  NEURO: UE and LE strengths 5/5 and equal bilaterally.   SKIN: No rash.  No nail pitting.  EXT: No LE edema  PSYCH: Alert. Appropriate.     Labs / Imaging (select studies)   RF/CCP  Recent Labs   Lab Test  08/07/15   0846  09/03/14   " 1232   RHF  <20  <20     CBC  Recent Labs   Lab Test  01/18/18   0834  09/02/16   0914  02/08/16   1555   WBC  8.5  10.4  9.0   RBC  5.23  5.08  4.82   HGB  15.2  15.8  14.7   HCT  45.8  46.9  45.0   MCV  88  92  93   RDW  13.6  14.2  13.8   PLT  330  255  264   MCH  29.1  31.1  30.5   MCHC  33.2  33.7  32.7   NEUTROPHIL  48.4  65.9  54.2   LYMPH  38.8  25.0  31.4   MONOCYTE  9.5  6.6  11.0   EOSINOPHIL  2.4  1.8  2.7   BASOPHIL  0.9  0.7  0.7   ANEU  4.1  6.8  4.9   ALYM  3.3  2.6  2.8   KATIE  0.8  0.7  1.0   AEOS  0.2  0.2  0.2   ABAS  0.1  0.1  0.1     CMP  Recent Labs   Lab Test  05/17/18   1417  04/19/18   1116  03/12/18   1559  01/18/18   0834   01/03/17   0825   02/08/16   1555   01/25/16   1154   NA   --   138  139  138   < >  139   < >   --    --    --    POTASSIUM   --   3.9  4.1  3.9   < >  4.0   < >   --    --    --    CHLORIDE   --   104  106  102   < >  105   < >   --    --    --    CO2   --   23  27  26   < >  27   < >   --    --    --    ANIONGAP   --   11  6  10   < >  7   < >   --    --    --    GLC   --   135*  95  106*   < >  99   < >   --    --    --    BUN   --   12  17  18   < >  13   < >   --    --    --    CR   --   0.97  1.30*  1.10   < >  1.32*   < >   --    < >   --    GFRESTIMATED   --   84  60*  73   < >  59*   < >   --    < >   --    GFRESTBLACK   --   >90  72  88   < >  71   < >   --    < >   --    ALYCE   --   9.4  9.1  9.4   < >  9.2   < >   --    --    --    BILITOTAL  0.4   --    --    --    --   0.3   --   0.2   --   0.3   ALBUMIN  4.2   --   4.1   --    --   4.2   --    --    --   4.0   PROTTOTAL  7.7   --    --    --    --   7.2   --    --    --   7.4   ALKPHOS  106   --    --    --    --   66   --   100   --   73   AST  40   --    --    --    --   18   --   21   --   13   ALT  48   --    --    --    --   27   --   28   --   16    < > = values in this interval not displayed.     Calcium/VitaminD  Recent Labs   Lab Test  04/19/18   1116  03/12/18   1559  01/18/18   0834   05/03/17   1456   02/08/16   1555  01/25/16   1154   09/26/11   0822  02/17/11   0839  08/28/10   1152   ALYCE  9.4  9.1  9.4  9.5   < >   --    --    < >  9.4  9.5   --    D3VIT   --    --    --    --    --    --    --    --   38  38  37   VITDT   --    --    --   30   --   34  44   < >   --    --    --     < > = values in this interval not displayed.     ESR/CRP  Recent Labs   Lab Test  02/13/16   1510  01/04/16   1403  10/25/15   0852   08/07/15   0846   SED  6  5   --    --   5   CRP  3.4  <2.9  3.1   < >   --     < > = values in this interval not displayed.     Hepatitis B  Recent Labs   Lab Test  02/28/18   1157  01/06/15   1028   HBCAB  Nonreactive   --    HEPBANG  Nonreactive  Nonreactive     Hepatitis C  Recent Labs   Lab Test  02/28/18   1157  01/06/15   1028   HCVAB  Nonreactive  Nonreactive   Assay performance characteristics have not been established for newborns,   infants, and children       Tuberculosis Screening  Recent Labs   Lab Test  02/28/18   1157  01/06/15   1028   TBRSLT  Negative  Negative   TBAGN  0.00  0.00     Immunization History     Immunization History   Administered Date(s) Administered     Influenza (IIV3) PF 10/15/2008, 08/21/2012, 09/09/2013     Influenza Vaccine IM 3yrs+ 4 Valent IIV4 10/02/2015, 10/11/2016     Pneumo Conj 13-V (2010&after) 10/02/2015     Pneumococcal 23 valent 10/11/2016     TD (ADULT, 7+) 10/04/2002     TDAP Vaccine (Adacel) 07/16/2010          Chart documentation done in part with Dragon Voice recognition Software. Although reviewed after completion, some word and grammatical error may remain.    Oneil Baxter MD

## 2018-09-19 NOTE — MR AVS SNAPSHOT
After Visit Summary   9/19/2018    Joel Pineda    MRN: [id number]           Patient Information     Date Of Birth          1975        Visit Information        Provider Department      9/19/2018 2:40 PM Oneil Baxter MD [facility]        Today's Diagnoses     Ankylosing spondylitis of sacral region (H)    -  1       Follow-ups after your visit        Your next 10 appointments already scheduled     Oct 16, 2018  1:30 PM CDT   Level 3 with [address] ([facility])    [address]  [facility] [address]   [phone]            Oct 16, 2018  2:00 PM CDT   SHORT with Radha Mcdonald [facility] ([facility])    [address] [place] [address] [id number]  [facility] [address]   [phone]            Jan 30, 2019  1:20 PM CST   Return Visit with Oneil Baxter MD   [facility] ([address] [id number] [phone]              Who to contact     If you have questions or need follow up information about today's clinic visit or your schedule please contact [facility] directly at [phone].  Normal or non-critical lab and imaging results will be communicated to you by [employer], letter or phone within 4 business days after the clinic has received the results. If you do not hear from us within 7 days, please contact the clinic through MyChart or phone. If you have a critical or abnormal lab result, we will notify you by phone as soon as possible.  Submit refill requests through [employer] or call your pharmacy and they will forward the refill request to us. Please allow 3 business days for your refill to be completed.          Additional Information About Your Visit        [employer] Information     [employer] gives you secure access to your electronic health record. If you see a primary care provider, you can  "also send messages to your care team and make appointments. If you have questions, please call your primary care clinic.  If you do not have a primary care provider, please call [phone] and they will assist you.        Care EveryWhere ID     This is your Care EveryWhere ID. This could be used by other organizations to access your [place] medical records  [id number]        Your Vitals Were     Pulse Temperature Respirations Height Pulse Oximetry BMI (Body Mass Index)    72 97.5  F (36.4  C) (Oral) 22 1.956 m (6' 5\") 98% 40.75 kg/m2       Blood Pressure from Last 3 Encounters:   09/19/18 112/82   09/04/18 (!) 138/99   08/28/18 (!) 138/98    Weight from Last 3 Encounters:   09/19/18 (!) 155.9 kg (343 lb 9.6 oz)   09/04/18 (!) 153.1 kg (337 lb 9.6 oz)   08/28/18 (!) 154.3 kg (340 lb 1.6 oz)              We Performed the Following     CBC with platelets differential     Comprehensive metabolic panel        Primary Care Provider Office Phone # Fax #    Ksenia Shady Lyles [phone] [address] [facility] [address]        Goals        General    I will continue to attend Psychiatry appointments for medication management, 1/14/2014 (pt-stated)     Notes - Note edited  8/24/2016  4:04 PM by Dalila Cavazos LSW    As of today's date 8/24/2016 goal is met at 51 - 75%.   Goal Status:  Ongoing Sees  Therapist every other week and Psych PA every 3 weeks.  As of today's date 5/25/2016 goal is met at 51 - 75%.   Goal Status:  Showing progress-  Meeting with Psych PA for second time tomorrow  To review meds As of today's date 4/11/2016 goal is met at 51 - 75%.   Goal Status:  Showing progress  As of today's date 1/14/2014 goal is met at 51 - 75%.   Goal Status:  Active        I will schedule therapy with new counselor, 1/14/2014 (pt-stated)     Notes - Note edited  5/25/2016  1:54 PM by Dalila Cavazos LSW    As of today's date 5/25/2016 goal is met at 76 - 100%.   Goal " Status:  Ongoing New therapist and Psych PA on a regular basis  As of today's date 5/10/2016 goal is met at 76 - 100%.   Goal Status:  Ongoing met with new therapist at [facility]  As of today's date 4/11/2016 goal is met at 51 - 75%.   Goal Status:  Ongoing meets with therapist regularly  As of today's date 1/14/2014 goal is met at 0 - 25%.   Goal Status:  Active        Psychosocial I need some help with cleaning (pt-stated)     Notes - Note edited  6/23/2016  1:57 PM by Dalila Cavazos LSW    As of today's date 6/23/2016 goal is met at 26 - 50%.   Goal Status:  Showing progress met with [facility]. Has not yet made a decision  As of today's date 5/25/2016 goal is met at 0 - 25%.   Goal Status:  Ongoing agency had to reschedule appt.  As of today's date 5/10/2016 goal is met at 0 - 25%.   Goal Status:  Active referral to [facility]        Equal Access to Services     ANNA CANO AH: Hadii aad ku hadasho Soomaali, waaxda luqadaha, qaybta kaalmada adeegyada, waxay idiin maci shankar . So [facility] [phone].    ATENCIÓN: Si habla español, tiene a faustin disposición servicios gratuitos de asistencia lingüística. Llame al [phone].    We comply with applicable federal civil rights laws and [place] laws. We do not discriminate on the basis of race, color, national origin, age, disability, sex, sexual orientation, or gender identity.            Thank you!     Thank you for choosing [facility]  for your care. Our goal is always to provide you with excellent care. Hearing back from our patients is one way we can continue to improve our services. Please take a few minutes to complete the written survey that you may receive in the mail after your visit with us. Thank you!             Your Updated Medication List - Protect others around you: Learn how to safely use, store and throw away your medicines at www.disposemymeds.org.          This list is accurate as of 9/19/18  3:10 PM.  Always use your most  recent med list.                   Brand Name Dispense Instructions for use Diagnosis    acetaminophen 500 MG Caps     60 capsule    Take 500 mg by mouth every 4 hours as needed        * albuterol 108 (90 Base) MCG/ACT inhaler    PROAIR HFA/PROVENTIL HFA/VENTOLIN HFA     Inhale 2 puffs into the lungs every 4 hours as needed        * albuterol (2.5 MG/3ML) 0.083% neb solution      Inhale 2.5 mg into the lungs        ALPRAZolam 1 MG 24 hr tablet    XANAX XR     Take 1 mg by mouth every morning        atorvastatin 40 MG tablet    LIPITOR    90 tablet    TAKE 1 TABLET (40 MG) BY MOUTH DAILY    Mixed hyperlipidemia       azithromycin 250 MG tablet    ZITHROMAX    6 tablet    Two tablets first day, then one tablet daily for four days.    Mild intermittent asthma with acute exacerbation       budesonide 0.5 MG/2ML neb solution    PULMICORT     Take 0.5 mg by nebulization 2 times daily        celecoxib 200 MG capsule    celeBREX    180 capsule    TAKE 1 CAPSULE BY MOUTH TWICE DAILY AS NEEDED FORMODERATE PAIN    Chronic midline low back pain without sciatica       cetirizine 10 MG tablet    zyrTEC    30 tablet    Take 1 tablet (10 mg) by mouth At Bedtime    Cough       cyanocobalamin 1000 MCG/ML injection    VITAMIN B12    10 mL    Inject 1 mL (1,000 mcg) into the muscle every 30 days    B12 deficiency       EPINEPHrine 0.3 MG/0.3ML injection 2-pack    EPIPEN/ADRENACLICK/or ANY BX GENERIC EQUIV    0.6 mL    Inject 0.3 mLs (0.3 mg) into the muscle once as needed for anaphylaxis    Food allergy       ginger root 550 MG Caps capsule      Take 550 mg by mouth Up to three times daily        hydrOXYzine 50 MG tablet    ATARAX     Take 50 mg by mouth daily        INFLIXIMAB IV           lamoTRIgine 25 MG tablet    LaMICtal     Take 200 mg by mouth daily        levothyroxine 75 MCG tablet    SYNTHROID/LEVOTHROID    90 tablet    TAKE 1 TABLET (75 MCG) BY MOUTH EVERY MORNING.    Acquired hypothyroidism       loperamide 2 MG capsule     IMODIUM     Take 2 mg by mouth 4 times daily as needed for diarrhea        metoprolol succinate 200 MG 24 hr tablet    TOPROL-XL    90 tablet    Take 1 tablet (200 mg) by mouth daily    Essential hypertension with goal blood pressure less than 140/90       montelukast 10 MG tablet    SINGULAIR     Take 10 mg by mouth        omega-3 acid ethyl esters 1 g capsule    Lovaza    360 capsule    TAKE 2 CAPSULES BY MOUTH TWICE DAILY.    Hypertriglyceridemia       omeprazole 20 MG tablet      Take 20 mg by mouth daily        order for DME     12 each    Equipment being ordered: 1 ml tuberculin syringes to be used for Vitamin B12 injections.    Vitamin B12 deficiency (non anaemic)       order for DME     1 Units    SI-loc belt    SI (sacroiliac) joint dysfunction       order for DME      [employer]ironics REMSTAR 60 Series Auto CPAP 10-12 cm H2O, Wisp nasal mask w/a large cushion and a chinstrap        oxyCODONE IR 5 MG tablet    ROXICODONE    35 tablet    Take 1-2 tablets (5-10 mg) by mouth every 6 hours as needed for pain (maximum 4 tablet(s) per day)    Facet arthropathy       pramipexole 0.5 MG tablet    MIRAPEX     Take 1/2 tab by mouth daily. Every 3 days, increase by 1/2 tab until taking a total of 4 tabs daily.        pregabalin 50 MG capsule    LYRICA    60 capsule    Take 1 capsule (50 mg) by mouth 2 times daily    Chronic midline low back pain without sciatica       pseudoePHEDrine 120 MG 12 hr tablet    SUDAFED    28 tablet    Take 1 tablet (120 mg) by mouth every 12 hours    Nasal congestion       ramipril 10 MG capsule    ALTACE    90 capsule    Take 1 capsule (10 mg) by mouth daily    Hypertension goal BP (blood pressure) < 140/90       risperiDONE 1 MG tablet    risperDAL     Take 0.5 mg by mouth daily        tiZANidine 4 MG tablet    ZANAFLEX    90 tablet    TAKE 1 TABLET BY MOUTH THREE TIMES DAILY AS NEEDED    Dorsalgia       vitamin D3 22040 UNITS capsule    CHOLECALCIFEROL    24 capsule    Take one capsule every  two weeks.    Vitamin D deficiency       * Notice:  This list has 2 medication(s) that are the same as other medications prescribed for you. Read the directions carefully, and ask your doctor or other care provider to review them with you.

## 2018-09-21 ENCOUNTER — TELEPHONE (OUTPATIENT)
Dept: PSYCHIATRY | Facility: CLINIC | Age: 43
End: 2018-09-21

## 2018-09-21 NOTE — TELEPHONE ENCOUNTER
Returned pt's call. However, there was no answer. Left VM informing pt that he is approved for treatment and requesting that he return a call to our clinic with any further questions.    ALEIDA Mohr MD  [facility]  Department of Psychiatry

## 2018-09-23 ENCOUNTER — MYC REFILL (OUTPATIENT)
Dept: FAMILY MEDICINE | Facility: CLINIC | Age: 43
End: 2018-09-23

## 2018-09-23 DIAGNOSIS — E53.8 B12 DEFICIENCY: ICD-10-CM

## 2018-09-23 DIAGNOSIS — E53.8 VITAMIN B12 DEFICIENCY WITHOUT ANEMIA: ICD-10-CM

## 2018-09-23 DIAGNOSIS — E53.8 B12 DEFICIENCY: Primary | ICD-10-CM

## 2018-09-23 DIAGNOSIS — G89.29 CHRONIC MIDLINE LOW BACK PAIN WITHOUT SCIATICA: ICD-10-CM

## 2018-09-23 DIAGNOSIS — M54.50 CHRONIC MIDLINE LOW BACK PAIN WITHOUT SCIATICA: ICD-10-CM

## 2018-09-24 RX ORDER — PREGABALIN 50 MG/1
50 CAPSULE ORAL 2 TIMES DAILY
Qty: 60 CAPSULE | Refills: 2 | Status: SHIPPED | OUTPATIENT
Start: 2018-09-24 | End: 2018-10-30

## 2018-09-24 NOTE — TELEPHONE ENCOUNTER
Prescription approved per [facility] Refill Protocol.    Martell Chaves RN, BSN

## 2018-09-24 NOTE — TELEPHONE ENCOUNTER
Message from [employer]:  Original authorizing provider: MD Tito Martinez would like a refill of the following medications:  order for DME [Houston Koroma MD]    Preferred pharmacy: [facility] [id number] [facility] [address] [address]    Comment:  DME = 12 Syringes for year of B12 Injections.    Medication renewals requested in this message routed to other providers:  cyanocobalamin (VITAMIN B12) 1000 MCG/ML injection [Ksenia Lyles MD]  pregabalin (LYRICA) 50 MG capsule [Faith Camacho PA-C]

## 2018-09-24 NOTE — TELEPHONE ENCOUNTER
Message from [employer]:  Original authorizing provider: MD Pankaj Dickeycarol [place] would like a refill of the following medications:  cyanocobalamin (VITAMIN B12) 1000 MCG/ML injection [Ksenia Lyles MD]    Preferred pharmacy: [facility] [id number] [facility] [address] [address]    Comment:  DME = 12 Syringes for year of B12 Injections.    Medication renewals requested in this message routed to other providers:  order for DME [Houston Koroma MD]  pregabalin (LYRICA) 50 MG capsule [Faith Camacho PA-C]

## 2018-09-24 NOTE — TELEPHONE ENCOUNTER
"Requested Prescriptions   Pending Prescriptions Disp Refills     cyanocobalamin (VITAMIN B12) 1000 MCG/ML injection 10 mL 1     Sig: Inject 1 mL (1,000 mcg) into the muscle every 30 days    Vitamin Supplements (Adult) Protocol Passed    9/24/2018  6:23 AM       Passed - High dose Vitamin D not ordered       Passed - Recent (12 mo) or future (30 days) visit within the authorizing provider's specialty    Patient had office visit in the last 12 months or has a visit in the next 30 days with authorizing provider or within the authorizing provider's specialty.  See \"Patient Info\" tab in inbasket, or \"Choose Columns\" in Meds & Orders section of the refill encounter.          cyanocobalamin (VITAMIN B12) 1000 MCG/ML injection  Last Written Prescription Date:  11/2/17  Last Fill Quantity: 10ml,  # refills: 1   Last office visit: 8/28/2018 with prescribing provider:  Dr. Lyles   Future Office Visit:   Next 5 appointments (look out 90 days)     Oct 16, 2018  2:00 PM CDT   SHORT with Radha Mcdonald [facility]    [address] [address] [place] [address] [address]  [address] [address] [phone]                   "

## 2018-09-24 NOTE — TELEPHONE ENCOUNTER
Requested Prescriptions   Pending Prescriptions Disp Refills     order for DME  Last Written Prescription Date:  09/02/15  Last Fill Quantity: 12,  # refills: 1   Last Office Visit with G, [facility] or [facility] prescribing provider:  08/28/18Floridalma   Future Office Visit:    Next 5 appointments (look out 90 days)     Oct 16, 2018  2:00 PM CDT   [facility]    [address] [place] [address]   [phone]                12 each 1     Sig: Equipment being ordered: 1 ml tuberculin syringes to be used for Vitamin B12 injections.    There is no refill protocol information for this order

## 2018-09-24 NOTE — TELEPHONE ENCOUNTER
Message from [employer]:  Original authorizing provider: SHERWIN Ferraro SON[place] Pineda would like a refill of the following medications:  pregabalin (LYRICA) 50 MG capsule [Faith Camacho PA-C]    Preferred pharmacy: [facility] [id number] [facility] [address] [address]    Comment:  DME = 12 Syringes for year of B12 Injections.    Medication renewals requested in this message routed to other providers:  order for DME [Houston Koroma MD]  cyanocobalamin (VITAMIN B12) 1000 MCG/ML injection [Ksenia Lyles MD]

## 2018-09-25 PROBLEM — M53.3 SACROILIAC JOINT DYSFUNCTION: Status: RESOLVED | Noted: 2018-05-31 | Resolved: 2018-09-25

## 2018-09-25 PROBLEM — M45.6 ANKYLOSING SPONDYLITIS LUMBAR REGION (H): Status: RESOLVED | Noted: 2018-05-31 | Resolved: 2018-09-25

## 2018-09-25 RX ORDER — CYANOCOBALAMIN 1000 UG/ML
1 INJECTION, SOLUTION INTRAMUSCULAR; SUBCUTANEOUS
Qty: 10 ML | Refills: 0 | Status: SHIPPED | OUTPATIENT
Start: 2018-09-25 | End: 2019-06-09

## 2018-09-25 NOTE — PROGRESS NOTES
Improved Subjective:  HPI                    Objective:  System    Physical Exam    General     ROS    Assessment/Plan:    DISCHARGE REPORT    Progress reporting period is from 5.31 to 9.25.18.     SUBJECTIVE  Subjective: better, less pain but my knees tweak on stairs now.  more in AM    Current Pain level: 4/10   Initial Pain level: 7/10   Changes in function: Yes, see goal flow sheet for change in function   Adverse reactions: None;   ,     Patient has failed to return to therapy so current objective findings are unknown.    OBJECTIVE  Objective: neg SI tests       ASSESSMENT/PLAN  Updated problem list and treatment plan: Diagnosis 1:  Back     STG/LTGs have been met or progress has been made towards goals:  None  Assessment of Progress: Patient has not returned to therapy.  Current status is unknown and discharge G code cannot be reported.  Self Management Plans:  Patient has been instructed in a home treatment program.  Patient  has been instructed in self management of symptoms.    Joel continues to require the following intervention to meet STG and LTG's:   The patient failed to complete scheduled/ordered appointments so current information is unknown.  We will discharge this patient from PT.    Recommendations:      Please refer to the daily flowsheet for treatment today, total treatment time and time spent performing 1:1 timed codes.

## 2018-09-25 NOTE — TELEPHONE ENCOUNTER
Prescription approved per [facility] Refill Protocol.    Sapna Barragan RN, [facility]

## 2018-10-02 ENCOUNTER — OFFICE VISIT (OUTPATIENT)
Dept: FAMILY MEDICINE | Facility: CLINIC | Age: 43
End: 2018-10-02
Payer: COMMERCIAL

## 2018-10-02 VITALS
BODY MASS INDEX: 40.44 KG/M2 | DIASTOLIC BLOOD PRESSURE: 92 MMHG | SYSTOLIC BLOOD PRESSURE: 142 MMHG | TEMPERATURE: 98.3 F | WEIGHT: 315 LBS | HEART RATE: 83 BPM | OXYGEN SATURATION: 100 %

## 2018-10-02 DIAGNOSIS — R07.0 THROAT PAIN: ICD-10-CM

## 2018-10-02 DIAGNOSIS — I10 ESSENTIAL HYPERTENSION WITH GOAL BLOOD PRESSURE LESS THAN 140/90: Chronic | ICD-10-CM

## 2018-10-02 DIAGNOSIS — J06.9 UPPER RESPIRATORY TRACT INFECTION, UNSPECIFIED TYPE: Primary | ICD-10-CM

## 2018-10-02 LAB
DEPRECATED S PYO AG THROAT QL EIA: NORMAL
SPECIMEN SOURCE: NORMAL

## 2018-10-02 PROCEDURE — 87081 CULTURE SCREEN ONLY: CPT | Performed by: FAMILY MEDICINE

## 2018-10-02 PROCEDURE — 87880 STREP A ASSAY W/OPTIC: CPT | Performed by: FAMILY MEDICINE

## 2018-10-02 PROCEDURE — 99214 OFFICE O/P EST MOD 30 MIN: CPT | Performed by: FAMILY MEDICINE

## 2018-10-02 RX ORDER — METOPROLOL SUCCINATE 200 MG/1
400 TABLET, EXTENDED RELEASE ORAL DAILY
Qty: 180 TABLET | Refills: 0 | Status: SHIPPED | OUTPATIENT
Start: 2018-10-02 | End: 2019-01-06

## 2018-10-02 RX ORDER — PREDNISONE 20 MG/1
60 TABLET ORAL DAILY
Qty: 15 TABLET | Refills: 0 | Status: SHIPPED | OUTPATIENT
Start: 2018-10-02 | End: 2018-11-06

## 2018-10-02 RX ORDER — AZITHROMYCIN 250 MG/1
TABLET, FILM COATED ORAL
Qty: 6 TABLET | Refills: 0 | Status: SHIPPED | OUTPATIENT
Start: 2018-10-02 | End: 2018-11-06

## 2018-10-02 ASSESSMENT — PAIN SCALES - GENERAL: PAINLEVEL: MODERATE PAIN (4)

## 2018-10-02 NOTE — MR AVS SNAPSHOT
After Visit Summary   10/2/2018    Joel Pineda    MRN: [id number]           Patient Information     Date Of Birth          1975        Visit Information        Provider Department      10/2/2018 5:40 PM Ksenia Lyles MD [facility]        Today's Diagnoses     Upper respiratory tract infection, unspecified type    -  1    Throat pain        Essential hypertension with goal blood pressure less than 140/90           Follow-ups after your visit        Follow-up notes from your care team     Return in about 3 months (around 1/2/2019) for Based upon progress.      Your next 10 appointments already scheduled     Oct 16, 2018  1:30 PM CDT   Level 3 with [address] ([facility])    [id number] [place] [address] [address]  [facility] [id number] [phone]            Oct 16, 2018  2:00 PM CDT   SHORT with Radha Mcdonald [facility] ([facility]    [id number] [place] [address] [address]  [id number]  [facility] [address] [phone]            Jan 30, 2019  1:20 PM CST   Return Visit with Oneil Baxter MD   [facility] ([facility])    [address] [place] [address] [phone]              Who to contact     If you have questions or need follow up information about today's clinic visit or your schedule please contact [facility] directly at [phone].  Normal or non-critical lab and imaging results will be communicated to you by MyChart, letter or phone within 4 business days after the clinic has received the results. If you do not hear from us within 7 days, please contact the clinic through MyChart or phone. If you have a critical or abnormal lab result, we will notify you by phone as soon as possible.  Submit refill requests through [employer] or call your pharmacy and they will forward the refill request to us. Please allow 3 business days  for your refill to be completed.          Additional Information About Your Visit        [employer] Information     [employer] gives you secure access to your electronic health record. If you see a primary care provider, you can also send messages to your care team and make appointments. If you have questions, please call your primary care clinic.  If you do not have a primary care provider, please call [phone] and they will assist you.        Care EveryWhere ID     This is your Care EveryWhere ID. This could be used by other organizations to access your [place] medical records  [id number]        Your Vitals Were     Pulse Temperature Pulse Oximetry BMI (Body Mass Index)          83 98.3  F (36.8  C) (Oral) 100% 40.44 kg/m2         Blood Pressure from Last 3 Encounters:   10/02/18 (!) 142/92   09/19/18 112/82   09/04/18 (!) 138/99    Weight from Last 3 Encounters:   10/02/18 (!) 154.7 kg (341 lb)   09/19/18 (!) 155.9 kg (343 lb 9.6 oz)   09/04/18 (!) 153.1 kg (337 lb 9.6 oz)              We Performed the Following     Rapid strep screen          Today's Medication Changes          These changes are accurate as of 10/2/18  6:08 PM.  If you have any questions, ask your nurse or doctor.               Start taking these medicines.        Dose/Directions    azithromycin 250 MG tablet   Commonly known as:  ZITHROMAX   Used for:  Upper respiratory tract infection, unspecified type   Started by:  Ksenia Lyles MD        Two tabs today.  One tab daily x 4 days.   Quantity:  6 tablet   Refills:  0       lidocaine (viscous) 2 % solution   Commonly known as:  XYLOCAINE   Used for:  Throat pain   Started by:  Ksenia Lyles MD        Dose:  15 mL   Take 15 mLs by mouth every 2 hours as needed for moderate pain max 8 doses/24 hour period   Quantity:  100 mL   Refills:  0       predniSONE 20 MG tablet   Commonly known as:  DELTASONE   Used for:  Upper respiratory tract infection, unspecified type   Started by:   Ksenia Lyles MD        Dose:  60 mg   Take 3 tablets (60 mg) by mouth daily   Quantity:  15 tablet   Refills:  0         These medicines have changed or have updated prescriptions.        Dose/Directions    metoprolol succinate 200 MG 24 hr tablet   Commonly known as:  TOPROL-XL   This may have changed:  how much to take   Used for:  Essential hypertension with goal blood pressure less than 140/90   Changed by:  Ksenia Lyles MD        Dose:  400 mg   Take 2 tablets (400 mg) by mouth daily   Quantity:  180 tablet   Refills:  0            Where to get your medicines      These medications were sent to [id number] - [place] - [address]  [address], [facility] [address]     Phone:  [phone]     azithromycin 250 MG tablet    lidocaine (viscous) 2 % solution    metoprolol succinate 200 MG 24 hr tablet    predniSONE 20 MG tablet                Primary Care Provider Office Phone # Fax #    Ksenia Lyles [phone] [address] [facility] [address]        Goals        General    I will continue to attend Psychiatry appointments for medication management, 1/14/2014 (pt-stated)     Notes - Note edited  8/24/2016  4:04 PM by Dalila Cavazos LSW    As of today's date 8/24/2016 goal is met at 51 - 75%.   Goal Status:  Ongoing Sees  Therapist every other week and Psych PA every 3 weeks.  As of today's date 5/25/2016 goal is met at 51 - 75%.   Goal Status:  Showing progress-  Meeting with Psych PA for second time tomorrow  To review meds As of today's date 4/11/2016 goal is met at 51 - 75%.   Goal Status:  Showing progress  As of today's date 1/14/2014 goal is met at 51 - 75%.   Goal Status:  Active        I will schedule therapy with new counselor, 1/14/2014 (pt-stated)     Notes - Note edited  5/25/2016  1:54 PM by Dalila Cavazos LSW    As of today's date 5/25/2016 goal is met at 76 - 100%.   Goal Status:   Ongoing New therapist and Psych PA on a regular basis  As of today's date 5/10/2016 goal is met at 76 - 100%.   Goal Status:  Ongoing met with new therapist at [facility]  As of today's date 4/11/2016 goal is met at 51 - 75%.   Goal Status:  Ongoing meets with therapist regularly  As of today's date 1/14/2014 goal is met at 0 - 25%.   Goal Status:  Active        Psychosocial I need some help with cleaning (pt-stated)     Notes - Note edited  6/23/2016  1:57 PM by Dalila Cavazos LSW    As of today's date 6/23/2016 goal is met at 26 - 50%.   Goal Status:  Showing progress met with [facility]. Has not yet made a decision  As of today's date 5/25/2016 goal is met at 0 - 25%.   Goal Status:  Ongoing agency had to reschedule appt.  As of today's date 5/10/2016 goal is met at 0 - 25%.   Goal Status:  Active referral to [facility]        Equal Access to Services     ANNA CANO AH: Hadii aad ku hadasho Soomaali, waaxda luqadaha, qaybta kaalmada adeegyada, waxay melissa shankar . So [facility] [phone].    ATENCIÓN: Si habla español, tiene a faustin disposición servicios gratuitos de asistencia lingüística. Llame al [phone].    We comply with applicable federal civil rights laws and [place] laws. We do not discriminate on the basis of race, color, national origin, age, disability, sex, sexual orientation, or gender identity.            Thank you!     Thank you for choosing [facility]  for your care. Our goal is always to provide you with excellent care. Hearing back from our patients is one way we can continue to improve our services. Please take a few minutes to complete the written survey that you may receive in the mail after your visit with us. Thank you!             Your Updated Medication List - Protect others around you: Learn how to safely use, store and throw away your medicines at www.disposemymeds.org.          This list is accurate as of 10/2/18  6:08 PM.  Always use your most recent  med list.                   Brand Name Dispense Instructions for use Diagnosis    acetaminophen 500 MG Caps     60 capsule    Take 500 mg by mouth every 4 hours as needed        * albuterol 108 (90 Base) MCG/ACT inhaler    PROAIR HFA/PROVENTIL HFA/VENTOLIN HFA     Inhale 2 puffs into the lungs every 4 hours as needed        * albuterol (2.5 MG/3ML) 0.083% neb solution      Inhale 2.5 mg into the lungs        ALPRAZolam 1 MG 24 hr tablet    XANAX XR     Take 1 mg by mouth every morning        atorvastatin 40 MG tablet    LIPITOR    90 tablet    TAKE 1 TABLET (40 MG) BY MOUTH DAILY    Mixed hyperlipidemia       azithromycin 250 MG tablet    ZITHROMAX    6 tablet    Two tabs today.  One tab daily x 4 days.    Upper respiratory tract infection, unspecified type       budesonide 0.5 MG/2ML neb solution    PULMICORT     Take 0.5 mg by nebulization 2 times daily        celecoxib 200 MG capsule    celeBREX    180 capsule    TAKE 1 CAPSULE BY MOUTH TWICE DAILY AS NEEDED FORMODERATE PAIN    Chronic midline low back pain without sciatica       cetirizine 10 MG tablet    zyrTEC    30 tablet    Take 1 tablet (10 mg) by mouth At Bedtime    Cough       cyanocobalamin 1000 MCG/ML injection    VITAMIN B12    10 mL    Inject 1 mL (1,000 mcg) into the muscle every 30 days    B12 deficiency       EPINEPHrine 0.3 MG/0.3ML injection 2-pack    EPIPEN/ADRENACLICK/or ANY BX GENERIC EQUIV    0.6 mL    Inject 0.3 mLs (0.3 mg) into the muscle once as needed for anaphylaxis    Food allergy       ginger root 550 MG Caps capsule      Take 550 mg by mouth Up to three times daily        hydrOXYzine 50 MG tablet    ATARAX     Take 50 mg by mouth daily        INFLIXIMAB IV           lamoTRIgine 25 MG tablet    LaMICtal     Take 200 mg by mouth daily        levothyroxine 75 MCG tablet    SYNTHROID/LEVOTHROID    90 tablet    TAKE 1 TABLET (75 MCG) BY MOUTH EVERY MORNING.    Acquired hypothyroidism       lidocaine (viscous) 2 % solution    XYLOCAINE     100 mL    Take 15 mLs by mouth every 2 hours as needed for moderate pain max 8 doses/24 hour period    Throat pain       loperamide 2 MG capsule    IMODIUM     Take 2 mg by mouth 4 times daily as needed for diarrhea        metoprolol succinate 200 MG 24 hr tablet    TOPROL-XL    180 tablet    Take 2 tablets (400 mg) by mouth daily    Essential hypertension with goal blood pressure less than 140/90       montelukast 10 MG tablet    SINGULAIR     Take 10 mg by mouth        omega-3 acid ethyl esters 1 g capsule    Lovaza    360 capsule    TAKE 2 CAPSULES BY MOUTH TWICE DAILY.    Hypertriglyceridemia       omeprazole 20 MG tablet      Take 20 mg by mouth daily        order for DME     12 each    Equipment being ordered: 1 ml tuberculin syringes to be used for Vitamin B12 injections.    Vitamin B12 deficiency (non anaemic)       order for DME     1 Units    SI-loc belt    SI (sacroiliac) joint dysfunction       order for DME      [employer]ics REMSTAR 60 Series Auto CPAP 10-12 cm H2O, Wisp nasal mask w/a large cushion and a chinstrap        oxyCODONE IR 5 MG tablet    ROXICODONE    35 tablet    Take 1-2 tablets (5-10 mg) by mouth every 6 hours as needed for pain (maximum 4 tablet(s) per day)    Facet arthropathy       predniSONE 20 MG tablet    DELTASONE    15 tablet    Take 3 tablets (60 mg) by mouth daily    Upper respiratory tract infection, unspecified type       pregabalin 50 MG capsule    LYRICA    60 capsule    Take 1 capsule (50 mg) by mouth 2 times daily    Chronic midline low back pain without sciatica       pseudoePHEDrine 120 MG 12 hr tablet    SUDAFED    28 tablet    Take 1 tablet (120 mg) by mouth every 12 hours    Nasal congestion       ramipril 10 MG capsule    ALTACE    90 capsule    Take 1 capsule (10 mg) by mouth daily    Hypertension goal BP (blood pressure) < 140/90       risperiDONE 1 MG tablet    risperDAL     Take 0.5 mg by mouth daily        syringe (disposable) 1 ML [employer]    BD TUBERCULIN SYRINGE     12 each    Equipment being ordered: 1 ml tuberculin syringes to be used for Vitamin B12 injections.    B12 deficiency       tiZANidine 4 MG tablet    ZANAFLEX    90 tablet    TAKE 1 TABLET BY MOUTH THREE TIMES DAILY AS NEEDED    Dorsalgia       vitamin D3 85705 UNITS capsule    CHOLECALCIFEROL    24 capsule    Take one capsule every two weeks.    Vitamin D deficiency       * Notice:  This list has 2 medication(s) that are the same as other medications prescribed for you. Read the directions carefully, and ask your doctor or other care provider to review them with you.

## 2018-10-02 NOTE — PROGRESS NOTES
SUBJECTIVE:   Joel Pineda is a 43 year old male who presents to clinic today for the following health issues:    Patient would also like an increase in Metoprolol medication.    Acute Illness   Acute illness concerns: Throat pain  Onset: Has worsened within last 2 days      Fever: no     Chills/Sweats: YES    Headache (location?): YES    Sinus Pressure:YES    Conjunctivitis:  YES: both    Ear Pain: YES:    Rhinorrhea: YES    Congestion: YES    Sore Throat: YES     Cough: YES-non-productive    Wheeze: no     Decreased Appetite: no     Nausea: no     Vomiting: no     Diarrhea:  YES    Dysuria/Freq.: YES    Fatigue/Achiness: no     Sick/Strep Exposure: no      Therapies Tried and outcome: Albuterol Neb and     SUBJECTIVE:  Here today with the above symptoms that started up in the past few days and are getting worse.  Very reminiscent of similar symptoms from a little over a month ago that turned into bronchitis and required treatment.  Patient is concerned because he is on Remicade.  We discussed that there is some relative immune suppression but does not necessarily mean that it becomes more dangerous for folks to get sick.  Has been noting that blood pressures are running high even prior to getting sick.  Recently ramipril was increased to 10 mg daily.    Review of systems otherwise negative.  Past medical, family, and social history reviewed and updated in chart.    OBJECTIVE:  BP (!) 142/92 (BP Location: Right arm, Patient Position: Chair, Cuff Size: Adult Large)  Pulse 83  Temp 98.3  F (36.8  C) (Oral)  Wt (!) 154.7 kg (341 lb)  SpO2 100%  BMI 40.44 kg/m2  Alert, pleasant, upbeat, and in no apparent discomfort.  Ears clear bilaterally  Nose clear  Oropharynx mildly erythematous  No cervical lymphadenopathy  S1 and S2 normal, no murmurs, clicks, gallops or rubs. Regular rate and rhythm. Chest is clear; no wheezes or rales. No edema or JVD.  Past labs reviewed with the patient.   QS negative     ASSESSMENT  / PLAN:  (J06.9) Upper respiratory tract infection, unspecified type  (primary encounter diagnosis)  Comment: Discussed mechanism of action of the proposed medication, as well as potential effects, both good and bad.  Patient expressed understanding and agreed with treatment.   Plan: azithromycin (ZITHROMAX) 250 MG tablet,         predniSONE (DELTASONE) 20 MG tablet            (R07.0) Throat pain  Comment: Discussed mechanism of action of the proposed medication, as well as potential effects, both good and bad.  Patient expressed understanding and agreed with treatment.   Plan: Rapid strep screen, lidocaine, viscous,         (XYLOCAINE) 2 % solution            (I10) Essential hypertension with goal blood pressure less than 140/90  Comment: Increase metoprolol to 400 mg daily and will follow blood pressure and heart rate  Plan: metoprolol succinate (TOPROL-XL) 200 MG 24 hr         tablet            Follow up 3 months or as needed  JA Lyles MD    (Chart documentation completed in part with Dragon voice-recognition software.  Even though reviewed some grammatical, spelling, and word errors may remain.)

## 2018-10-03 LAB
BACTERIA SPEC CULT: NORMAL
SPECIMEN SOURCE: NORMAL

## 2018-10-04 ENCOUNTER — NURSE TRIAGE (OUTPATIENT)
Dept: NURSING | Facility: CLINIC | Age: 43
End: 2018-10-04

## 2018-10-04 NOTE — TELEPHONE ENCOUNTER
Reason for Disposition    [1] MODERATE difficulty breathing (e.g., speaks in phrases, SOB even at rest, pulse 100-120) AND [2] NEW-onset or WORSE than normal    Additional Information    Negative: [1] Breathing stopped AND [2] hasn't returned    Negative: Choking on something    Negative: Severe difficulty breathing (e.g., struggling for each breath, speaks in single words)    Negative: Bluish lips, tongue, or face now    Negative: Difficult to awaken or acting confused  (e.g., disoriented, slurred speech)    Negative: Passed out (i.e., lost consciousness, collapsed and was not responding)    Negative: Wheezing started suddenly after medicine, an allergic food or bee sting    Negative: Stridor    Negative: Slow, shallow and weak breathing    Negative: Sounds like a life-threatening emergency to the triager    Negative: Chest pain    Negative: [1] Wheezing (high pitched whistling sound) AND [2] previous asthma attacks or use of asthma medicines    Negative: [1] Difficulty breathing AND [2] only present when coughing    Negative: [1] Difficulty breathing AND [2] only from stuffy or runny nose    Protocols used: BREATHING DIFFICULTY-ADULT-AH  Caller states he is having difficulty breathing along with a cough. Caller is on antibiotics, prednisone and inhalers. Caller states the nebulizer's and inhalers were non effectives at alleviating his shortness of breath. Triage guidelines recommend to go to ED. Caller verbalized and understands directives.

## 2018-10-07 ENCOUNTER — NURSE TRIAGE (OUTPATIENT)
Dept: NURSING | Facility: CLINIC | Age: 43
End: 2018-10-07

## 2018-10-07 ENCOUNTER — MYC REFILL (OUTPATIENT)
Dept: FAMILY MEDICINE | Facility: CLINIC | Age: 43
End: 2018-10-07

## 2018-10-07 ENCOUNTER — OFFICE VISIT (OUTPATIENT)
Dept: URGENT CARE | Facility: URGENT CARE | Age: 43
End: 2018-10-07
Payer: COMMERCIAL

## 2018-10-07 VITALS
DIASTOLIC BLOOD PRESSURE: 77 MMHG | OXYGEN SATURATION: 95 % | SYSTOLIC BLOOD PRESSURE: 119 MMHG | TEMPERATURE: 98.2 F | HEART RATE: 86 BPM | RESPIRATION RATE: 17 BRPM | WEIGHT: 315 LBS | BODY MASS INDEX: 40.2 KG/M2

## 2018-10-07 DIAGNOSIS — J01.01 ACUTE RECURRENT MAXILLARY SINUSITIS: Primary | ICD-10-CM

## 2018-10-07 DIAGNOSIS — M47.819 FACET ARTHROPATHY: ICD-10-CM

## 2018-10-07 DIAGNOSIS — G89.4 CHRONIC PAIN SYNDROME: Primary | Chronic | ICD-10-CM

## 2018-10-07 PROCEDURE — 99213 OFFICE O/P EST LOW 20 MIN: CPT | Performed by: NURSE PRACTITIONER

## 2018-10-07 ASSESSMENT — ENCOUNTER SYMPTOMS
SINUS PRESSURE: 1
SINUS PAIN: 1
MUSCULOSKELETAL NEGATIVE: 1
GASTROINTESTINAL NEGATIVE: 1
RESPIRATORY NEGATIVE: 1

## 2018-10-07 NOTE — PATIENT INSTRUCTIONS
Sinusitis (Antibiotic Treatment)    The sinuses are air-filled spaces within the bones of the face. They connect to the inside of the nose. Sinusitis is an inflammation of the tissue that lines the sinuses. Sinusitis can occur during a cold. It can also happen due to allergies to pollens and other particles in the air. Sinusitis can cause symptoms of sinus congestion and a feeling of fullness. A sinus infection causes fever, headache, and facial pain. There is often green or yellow fluid draining from the nose or into the back of the throat (post-nasal drip). You have been given antibiotics to treat this condition.  Home care    Take the full course of antibiotics as instructed. Do not stop taking them, even when you feel better.    Drink plenty of water, hot tea, and other liquids. This may help thin nasal mucus. It also may help your sinuses drain fluids.    Heat may help soothe painful areas of your face. Use a towel soaked in hot water. Or,  the shower and direct the warm spray onto your face. Using a vaporizer along with a menthol rub at night may also help soothe symptoms.     An expectorant with guaifenesin may help thin nasal mucus and help your sinuses drain fluids.    You can use an over-the-counter decongestant, unless a similar medicine was prescribed to you. Nasal sprays work the fastest. Use one that contains phenylephrine or oxymetazoline. First blow your nose gently. Then use the spray. Do not use these medicines more often than directed on the label. If you do, your symptoms may get worse. You may also take pills that contain pseudoephedrine. Don t use products that combine multiple medicines. This is because side effects may be increased. Read labels. You can also ask the pharmacist for help. (People with high blood pressure should not use decongestants. They can raise blood pressure.)    Over-the-counter antihistamines may help if allergies contributed to your sinusitis.      Do not use  nasal rinses or irrigation during an acute sinus infection, unless your healthcare provider tells you to. Rinsing may spread the infection to other areas in your sinuses.    Use acetaminophen or ibuprofen to control pain, unless another pain medicine was prescribed to you. If you have chronic liver or kidney disease or ever had a stomach ulcer, talk with your healthcare provider before using these medicines. (Aspirin should never be taken by anyone under age 18 who is ill with a fever. It may cause severe liver damage.)    Don't smoke. This can make symptoms worse.  Follow-up care  Follow up with your healthcare provider or our staff if you are better in 1 week.  When to seek medical advice  Call your healthcare provider if any of these occur:    Facial pain or headache that gets worse    Stiff neck    Unusual drowsiness or confusion    Swelling of your forehead or eyelids    Vision problems, such as blurred or double vision    Fever of 100.4 F (38 C) or higher, or as directed by your healthcare provider    Seizure    Breathing problems    Symptoms don't go away in 10 days  Prevention  Here are steps you can take to help prevent an infection:    Keep good hand washing habits.    Don t have close contact with people who have sore throats, colds, or other upper respiratory infections.    Don t smoke, and stay away from secondhand smoke.    Stay up to date with of your vaccines.  Date Last Reviewed: 11/1/2017 2000-2017 [employer]. [address] [place] [address], [place] [address]. All rights reserved. This information is not intended as a substitute for professional medical care. Always follow your healthcare professional's instructions.

## 2018-10-07 NOTE — PROGRESS NOTES
"SUBJECTIVE:   Joel Pineda is a 43 year old male presenting with a chief complaint of   Chief Complaint   Patient presents with     URI     worse       He is an established patient of [facility].    URI Adult     Patient presents for an additional course of antibiotics due to ongoing sinusitis symptoms. Patient reports taking full course of antibiotic z-osorio and high dose steroids as prescribed. Presents due to ongoing facial pressure despite treatment and nasal rinses. Denies any new onset of respiratory symptoms. Denies any N/V or diarrhea.       Review of Systems   HENT: Positive for congestion, sinus pain and sinus pressure.    Respiratory: Negative.    Gastrointestinal: Negative.    Musculoskeletal: Negative.    All other systems reviewed and are negative.      Past Medical History:   Diagnosis Date     Acne      Chest pain     Chest pain, regulated w/BP meds. Clear arteries.     Skin exam, screening for cancer 12/3/2013     Family History   Problem Relation Age of Onset     Musculoskeletal Disorder Mother      back     Anxiety Disorder Mother      Colon Polyps Mother      Ulcerative Colitis Mother      and ischemic small intestine, surgery     Hypertension Mother      Breast Cancer Mother      Osteoporosis Mother      Diabetes Mother      Type 2, Diagnosed in 2014     Depression Mother      Takes Cymbalta to help with chronic pain + depx     Thyroid Disease Mother      Hypothyroidism     Obesity Mother      Under much better control latter half of 2015     Musculoskeletal Disorder Father      back     Substance Abuse Father      Hypertension Father      Hyperlipidemia Father      Depression Father      Off meds for many years. Seems \"ok\"     HEART DISEASE Maternal Grandmother      HEART DISEASE Maternal Grandfather      Psychotic Disorder Paternal Grandfather      Suicide Paternal Grandfather      Depression Paternal Grandfather      Pediatrician. Committed suicide by pistol in 1990.     Musculoskeletal " Disorder Brother      back     Depression Brother      Expressed as anger and moodiness     Substance Abuse Sister      Depression Sister      Mental Health Therapist, yet no anti-depressants?     Anxiety Disorder Sister      Mental Health Therapist, yet no anti-anxiety meds?     Other Cancer Other      Bladder Cancer - Fatal     Substance Abuse Brother      Colon Cancer No family hx of      Crohn Disease No family hx of      Anesthesia Reaction No family hx of      Cancer No family hx of      No family history of skin cancer     Current Outpatient Prescriptions   Medication Sig Dispense Refill     acetaminophen 500 MG CAPS Take 500 mg by mouth every 4 hours as needed 60 capsule      albuterol (2.5 MG/3ML) 0.083% neb solution Inhale 2.5 mg into the lungs       albuterol (PROAIR HFA/PROVENTIL HFA/VENTOLIN HFA) 108 (90 BASE) MCG/ACT Inhaler Inhale 2 puffs into the lungs every 4 hours as needed       ALPRAZolam (XANAX XR) 1 MG 24 hr tablet Take 1 mg by mouth every morning       atorvastatin (LIPITOR) 40 MG tablet TAKE 1 TABLET (40 MG) BY MOUTH DAILY 90 tablet 1     azithromycin (ZITHROMAX) 250 MG tablet Two tabs today.  One tab daily x 4 days. 6 tablet 0     budesonide (PULMICORT) 0.5 MG/2ML neb solution Take 0.5 mg by nebulization 2 times daily       celecoxib (CELEBREX) 200 MG capsule TAKE 1 CAPSULE BY MOUTH TWICE DAILY AS NEEDED FORMODERATE PAIN 180 capsule 1     cetirizine (ZYRTEC) 10 MG tablet Take 1 tablet (10 mg) by mouth At Bedtime 30 tablet 11     cyanocobalamin (VITAMIN B12) 1000 MCG/ML injection Inject 1 mL (1,000 mcg) into the muscle every 30 days 10 mL 0     Dextromethorphan Polistirex (DELSYM PO)        EPINEPHrine (EPIPEN/ADRENACLICK/OR ANY BX GENERIC EQUIV) 0.3 MG/0.3ML injection 2-pack Inject 0.3 mLs (0.3 mg) into the muscle once as needed for anaphylaxis 0.6 mL 3     fluticasone-salmeterol (ADVAIR DISKUS) 250-50 MCG/DOSE diskus inhaler Inhale 1 puff into the lungs       Ginger, Zingiber officinalis,  (GINGER ROOT) 550 MG CAPS capsule Take 550 mg by mouth Up to three times daily       hydrOXYzine (ATARAX) 50 MG tablet Take 50 mg by mouth daily       INFLIXIMAB IV        lamoTRIgine (LAMICTAL) 25 MG tablet Take 200 mg by mouth daily        levothyroxine (SYNTHROID/LEVOTHROID) 75 MCG tablet TAKE 1 TABLET (75 MCG) BY MOUTH EVERY MORNING. 90 tablet 0     lidocaine, viscous, (XYLOCAINE) 2 % solution Take 15 mLs by mouth every 2 hours as needed for moderate pain max 8 doses/24 hour period 100 mL 0     loperamide (IMODIUM) 2 MG capsule Take 2 mg by mouth 4 times daily as needed for diarrhea       metoprolol succinate (TOPROL-XL) 200 MG 24 hr tablet Take 2 tablets (400 mg) by mouth daily 180 tablet 0     montelukast (SINGULAIR) 10 MG tablet Take 10 mg by mouth       omega-3 acid ethyl esters (LOVAZA) 1 g capsule TAKE 2 CAPSULES BY MOUTH TWICE DAILY. 360 capsule 1     omeprazole 20 MG tablet Take 20 mg by mouth daily        order for DME Respironics REMSTAR 60 Series Auto CPAP 10-12 cm H2O, Wisp nasal mask w/a large cushion and a chinstrap       order for DME SI-loc belt 1 Units 0     order for DME Equipment being ordered: 1 ml tuberculin syringes to be used for Vitamin B12 injections. 12 each 1     oxyCODONE IR (ROXICODONE) 5 MG tablet Take 1-2 tablets (5-10 mg) by mouth every 6 hours as needed for pain (maximum 4 tablet(s) per day) 35 tablet 0     predniSONE (DELTASONE) 20 MG tablet Take 3 tablets (60 mg) by mouth daily 15 tablet 0     pregabalin (LYRICA) 50 MG capsule Take 1 capsule (50 mg) by mouth 2 times daily 60 capsule 2     pseudoePHEDrine (SUDAFED) 120 MG 12 hr tablet Take 1 tablet (120 mg) by mouth every 12 hours 28 tablet 0     ramipril (ALTACE) 10 MG capsule Take 1 capsule (10 mg) by mouth daily 90 capsule 1     risperiDONE (RISPERDAL) 1 MG tablet Take 0.5 mg by mouth daily        syringe, disposable, (BD TUBERCULIN SYRINGE) 1 ML MISC Equipment being ordered: 1 ml tuberculin syringes to be used for Vitamin  B12 injections. 12 each 1     tiZANidine (ZANAFLEX) 4 MG tablet TAKE 1 TABLET BY MOUTH THREE TIMES DAILY AS NEEDED 90 tablet 4     vitamin D3 (CHOLECALCIFEROL) 82061 UNITS capsule Take one capsule every two weeks. 24 capsule 1     Social History   Substance Use Topics     Smoking status: Never Smoker     Smokeless tobacco: Never Used     Alcohol use No       OBJECTIVE  /77 (BP Location: Left arm, Patient Position: Chair, Cuff Size: Adult Large)  Pulse 86  Temp 98.2  F (36.8  C) (Oral)  Resp 17  Wt (!) 339 lb (153.8 kg)  SpO2 95%  BMI 40.2 kg/m2    Physical Exam   Constitutional: He is oriented to person, place, and time. No distress.   HENT:   Nasal: bilateral congestion remains present  Ears: Bilateral TMs with serous fluid, clear with good light reflex, no erythema; bilateral canals clear without erythema  Oropharynx: erythremic without hypertrophy or exudate     Eyes: Conjunctivae are normal. Right eye exhibits no discharge. Left eye exhibits no discharge.   Neck: Neck supple.   Cardiovascular: Normal rate, regular rhythm, normal heart sounds and intact distal pulses.  Exam reveals no friction rub.    No murmur heard.  Pulmonary/Chest: Effort normal and breath sounds normal. No respiratory distress. He has no wheezes.   Lymphadenopathy:     He has no cervical adenopathy.   Neurological: He is alert and oriented to person, place, and time.   Skin: Skin is warm and dry. No rash noted. No pallor.   Psychiatric: He has a normal mood and affect.       Labs:  No results found. However, due to the size of the patient record, not all encounters were searched. Please check Results Review for a complete set of results.      ASSESSMENT:  No diagnosis found.     Medical Decision Making:    Differential Diagnosis:  URI Adult/Peds:  Sinusitis    Serious Comorbid Conditions:  Adult:  None    PLAN: Discussed with patient given recent course of antibiotics and high dose prednisone that he continues to taper, no  further treatment course would be warranted. Advised patient to increase rest and hydration, follow up with PCP early next week. Patient agreed to the plan of care with no further questions or concerns.     JOCELYN Carmen, CNP    Patient Instructions     Sinusitis (Antibiotic Treatment)    The sinuses are air-filled spaces within the bones of the face. They connect to the inside of the nose. Sinusitis is an inflammation of the tissue that lines the sinuses. Sinusitis can occur during a cold. It can also happen due to allergies to pollens and other particles in the air. Sinusitis can cause symptoms of sinus congestion and a feeling of fullness. A sinus infection causes fever, headache, and facial pain. There is often green or yellow fluid draining from the nose or into the back of the throat (post-nasal drip). You have been given antibiotics to treat this condition.  Home care    Take the full course of antibiotics as instructed. Do not stop taking them, even when you feel better.    Drink plenty of water, hot tea, and other liquids. This may help thin nasal mucus. It also may help your sinuses drain fluids.    Heat may help soothe painful areas of your face. Use a towel soaked in hot water. Or,  the shower and direct the warm spray onto your face. Using a vaporizer along with a menthol rub at night may also help soothe symptoms.     An expectorant with guaifenesin may help thin nasal mucus and help your sinuses drain fluids.    You can use an over-the-counter decongestant, unless a similar medicine was prescribed to you. Nasal sprays work the fastest. Use one that contains phenylephrine or oxymetazoline. First blow your nose gently. Then use the spray. Do not use these medicines more often than directed on the label. If you do, your symptoms may get worse. You may also take pills that contain pseudoephedrine. Don t use products that combine multiple medicines. This is because side effects may be  increased. Read labels. You can also ask the pharmacist for help. (People with high blood pressure should not use decongestants. They can raise blood pressure.)    Over-the-counter antihistamines may help if allergies contributed to your sinusitis.      Do not use nasal rinses or irrigation during an acute sinus infection, unless your healthcare provider tells you to. Rinsing may spread the infection to other areas in your sinuses.    Use acetaminophen or ibuprofen to control pain, unless another pain medicine was prescribed to you. If you have chronic liver or kidney disease or ever had a stomach ulcer, talk with your healthcare provider before using these medicines. (Aspirin should never be taken by anyone under age 18 who is ill with a fever. It may cause severe liver damage.)    Don't smoke. This can make symptoms worse.  Follow-up care  Follow up with your healthcare provider or our staff if you are better in 1 week.  When to seek medical advice  Call your healthcare provider if any of these occur:    Facial pain or headache that gets worse    Stiff neck    Unusual drowsiness or confusion    Swelling of your forehead or eyelids    Vision problems, such as blurred or double vision    Fever of 100.4 F (38 C) or higher, or as directed by your healthcare provider    Seizure    Breathing problems    Symptoms don't go away in 10 days  Prevention  Here are steps you can take to help prevent an infection:    Keep good hand washing habits.    Don t have close contact with people who have sore throats, colds, or other upper respiratory infections.    Don t smoke, and stay away from secondhand smoke.    Stay up to date with of your vaccines.  Date Last Reviewed: 11/1/2017 2000-2017 [employer]. [address] [place] [address], [place] [address]. All rights reserved. This information is not intended as a substitute for professional medical care. Always follow your healthcare professional's  instructions.

## 2018-10-07 NOTE — TELEPHONE ENCOUNTER
"He has to drive himself to the [facility] urgent care. I advised he rest first.  Ivana Craft RN-[facility]    Reason for Disposition    [1] MODERATE dizziness (e.g., interferes with normal activities) AND [2] has NOT been evaluated by physician for this  (Exception: dizziness caused by heat exposure, sudden standing, or poor fluid intake)    Additional Information    Negative: Severe difficulty breathing (e.g., struggling for each breath, speaks in single words)    Negative: [1] Difficulty breathing or swallowing AND [2] started suddenly after medicine, an allergic food or bee sting    Negative: Shock suspected (e.g., cold/pale/clammy skin, too weak to stand, low BP, rapid pulse)    Negative: Difficult to awaken or acting confused  (e.g., disoriented, slurred speech)    Negative: [1] Weakness (i.e., paralysis, loss of muscle strength) of the face, arm or leg on one side of the body AND [2] sudden onset AND [3] present now    Negative: [1] Numbness (i.e., loss of sensation) of the face, arm or leg on one side of the body AND [2] sudden onset AND [3] present now    Negative: [1] Loss of speech or garbled speech AND [2] sudden onset AND [3] present now    Negative: Overdose (accidental or intentional) of medications    Negative: [1] Fainted > 15 minutes ago AND [2] still feels too weak or dizzy to stand    Negative: Heart beating < 50 beats per minute OR > 140 beats per minute    Negative: Sounds like a life-threatening emergency to the triager    Negative: Chest pain    Negative: Rectal bleeding, bloody stool, or tarry-black stool    Negative: [1] Vomiting AND [2] contains red blood or black (\"coffee ground\") material    Negative: Vomiting is main symptom    Negative: Diarrhea is main symptom    Negative: Headache is main symptom    Negative: Patient states that he/she is having an anxiety/panic attack    Negative: Dizziness from low blood sugar (i.e., < 60 mg/dl or 3.5 mmol/l)    Negative: " "Dizziness is described as a spinning sensation (i.e., vertigo)    Negative: Heat exhaustion suspected    Negative: Difficulty breathing    Negative: SEVERE dizziness (e.g., unable to stand, requires support to walk, feels like passing out now)    Negative: Extra heart beats OR irregular heart beating  (i.e., \"palpitations\")    Negative: [1] Drinking very little AND [2] dehydration suspected (e.g., no urine > 12 hours, very dry mouth, very lightheaded)    Negative: Patient sounds very sick or weak to the triager    Negative: [1] Dizziness caused by heat exposure, sudden standing, or poor fluid intake AND [2] no improvement after 2 hours of rest and fluids    Negative: [1] Fever > 103 F (39.4 C) AND [2] not able to get the fever down using Fever Care Advice    Negative: [1] Fever > 101 F (38.3 C) AND [2] age > 60    Negative: [1] Fever > 101 F (38.3 C) AND [2] bedridden (e.g., nursing home patient, CVA, chronic illness, recovering from surgery)    Negative: [1] Fever > 100.5 F (38.1 C) AND [2] diabetes mellitus or weak immune system (e.g., HIV positive, cancer chemo, splenectomy, organ transplant, chronic steroids)    Protocols used: DIZZINESS - LIGHTHEADEDNESS-ADULT-AH      "

## 2018-10-07 NOTE — MR AVS SNAPSHOT
After Visit Summary   10/7/2018    Joel Pineda    MRN: [id number]           Patient Information     Date Of Birth          1975        Visit Information        Provider Department      10/7/2018 11:25 AM Nilesh Goldman APRN [facility]        Care Instructions      Sinusitis (Antibiotic Treatment)    The sinuses are air-filled spaces within the bones of the face. They connect to the inside of the nose. Sinusitis is an inflammation of the tissue that lines the sinuses. Sinusitis can occur during a cold. It can also happen due to allergies to pollens and other particles in the air. Sinusitis can cause symptoms of sinus congestion and a feeling of fullness. A sinus infection causes fever, headache, and facial pain. There is often green or yellow fluid draining from the nose or into the back of the throat (post-nasal drip). You have been given antibiotics to treat this condition.  Home care    Take the full course of antibiotics as instructed. Do not stop taking them, even when you feel better.    Drink plenty of water, hot tea, and other liquids. This may help thin nasal mucus. It also may help your sinuses drain fluids.    Heat may help soothe painful areas of your face. Use a towel soaked in hot water. Or,  the shower and direct the warm spray onto your face. Using a vaporizer along with a menthol rub at night may also help soothe symptoms.     An expectorant with guaifenesin may help thin nasal mucus and help your sinuses drain fluids.    You can use an over-the-counter decongestant, unless a similar medicine was prescribed to you. Nasal sprays work the fastest. Use one that contains phenylephrine or oxymetazoline. First blow your nose gently. Then use the spray. Do not use these medicines more often than directed on the label. If you do, your symptoms may get worse. You may also take pills that contain pseudoephedrine. Don t use products that combine  multiple medicines. This is because side effects may be increased. Read labels. You can also ask the pharmacist for help. (People with high blood pressure should not use decongestants. They can raise blood pressure.)    Over-the-counter antihistamines may help if allergies contributed to your sinusitis.      Do not use nasal rinses or irrigation during an acute sinus infection, unless your healthcare provider tells you to. Rinsing may spread the infection to other areas in your sinuses.    Use acetaminophen or ibuprofen to control pain, unless another pain medicine was prescribed to you. If you have chronic liver or kidney disease or ever had a stomach ulcer, talk with your healthcare provider before using these medicines. (Aspirin should never be taken by anyone under age 18 who is ill with a fever. It may cause severe liver damage.)    Don't smoke. This can make symptoms worse.  Follow-up care  Follow up with your healthcare provider or our staff if you are better in 1 week.  When to seek medical advice  Call your healthcare provider if any of these occur:    Facial pain or headache that gets worse    Stiff neck    Unusual drowsiness or confusion    Swelling of your forehead or eyelids    Vision problems, such as blurred or double vision    Fever of 100.4 F (38 C) or higher, or as directed by your healthcare provider    Seizure    Breathing problems    Symptoms don't go away in 10 days  Prevention  Here are steps you can take to help prevent an infection:    Keep good hand washing habits.    Don t have close contact with people who have sore throats, colds, or other upper respiratory infections.    Don t smoke, and stay away from secondhand smoke.    Stay up to date with of your vaccines.  Date Last Reviewed: 11/1/2017 2000-2017 [employer]. [address] [place] [address], [place] [address]. All rights reserved. This information is not intended as a substitute for professional medical care. Always follow  your healthcare professional's instructions.                Follow-ups after your visit        Follow-up notes from your care team     Return in about 3 days (around 10/10/2018), or if symptoms worsen or fail to improve, for Physical Exam with PCP .      Your next 10 appointments already scheduled     Oct 16, 2018  1:30 PM CDT   Level 3 with [address] ([facility])    [address]  [facility] [address] [phone]            Oct 16, 2018  2:00 PM CDT   SHORT with Radha Mcdonald [facility]    [address]  [address]  [facility] [address] [phone]            Jan 30, 2019  1:20 PM CST   [facility] with [facility]il Baxter MD   [facility]    [address] [place] [address] [phone]              Who to contact     If you have questions or need follow up information about today's clinic visit or your schedule please contact [facility] directly at [phone].  Normal or non-critical lab and imaging results will be communicated to you by MyChart, letter or phone within 4 business days after the clinic has received the results. If you do not hear from us within 7 days, please contact the clinic through [employer] or phone. If you have a critical or abnormal lab result, we will notify you by phone as soon as possible.  Submit refill requests through [employer] or call your pharmacy and they will forward the refill request to us. Please allow 3 business days for your refill to be completed.          Additional Information About Your Visit        MyChart Information     [employer] gives you secure access to your electronic health record. If you see a primary care provider, you can also send messages to your care team and make appointments. If you have questions, please call your primary care clinic.  If you  do not have a primary care provider, please call [phone] and they will assist you.        Care EveryWhere ID     This is your Care EveryWhere ID. This could be used by other organizations to access your [place] medical records  [id number]        Your Vitals Were     Pulse Temperature Respirations Pulse Oximetry BMI (Body Mass Index)       86 98.2  F (36.8  C) (Oral) 17 95% 40.2 kg/m2        Blood Pressure from Last 3 Encounters:   10/07/18 119/77   10/02/18 (!) 142/92   09/19/18 112/82    Weight from Last 3 Encounters:   10/07/18 (!) 339 lb (153.8 kg)   10/02/18 (!) 341 lb (154.7 kg)   09/19/18 (!) 343 lb 9.6 oz (155.9 kg)              Today, you had the following     No orders found for display       Primary Care Provider Office Phone # Fax #    Ksenia Shady Lyles [phone] [address] [facility] [address]        Goals        General    I will continue to attend Psychiatry appointments for medication management, 1/14/2014 (pt-stated)     Notes - Note edited  8/24/2016  4:04 PM by Dalila Cavazos LSW    As of today's date 8/24/2016 goal is met at 51 - 75%.   Goal Status:  Ongoing Sees  Therapist every other week and Psych PA every 3 weeks.  As of today's date 5/25/2016 goal is met at 51 - 75%.   Goal Status:  Showing progress-  Meeting with Psych PA for second time tomorrow  To review meds As of today's date 4/11/2016 goal is met at 51 - 75%.   Goal Status:  Showing progress  As of today's date 1/14/2014 goal is met at 51 - 75%.   Goal Status:  Active        I will schedule therapy with new counselor, 1/14/2014 (pt-stated)     Notes - Note edited  5/25/2016  1:54 PM by Dalila Cavazos LSW    As of today's date 5/25/2016 goal is met at 76 - 100%.   Goal Status:  Ongoing New therapist and Psych PA on a regular basis  As of today's date 5/10/2016 goal is met at 76 - 100%.   Goal Status:  Ongoing met with new therapist at [facility]  As of today's date 4/11/2016 goal  is met at 51 - 75%.   Goal Status:  Ongoing meets with therapist regularly  As of today's date 1/14/2014 goal is met at 0 - 25%.   Goal Status:  Active        Psychosocial I need some help with cleaning (pt-stated)     Notes - Note edited  6/23/2016  1:57 PM by Dalila Cavazos LSW    As of today's date 6/23/2016 goal is met at 26 - 50%.   Goal Status:  Showing progress met with [facility]. Has not yet made a decision  As of today's date 5/25/2016 goal is met at 0 - 25%.   Goal Status:  Ongoing agency had to reschedule appt.  As of today's date 5/10/2016 goal is met at 0 - 25%.   Goal Status:  Active referral to [facility]        Equal Access to Services     ANNA CANO : Hadii floyd heardo Soisaac, waaxda luqadaha, qaybta kaalmada adeegyafabrice, isabell echevarria. So [facility] [phone].    ATENCIÓN: Si habla español, tiene a faustin disposición servicios gratuitos de asistencia lingüística. Llame al [phone].    We comply with applicable federal civil rights laws and [place] laws. We do not discriminate on the basis of race, color, national origin, age, disability, sex, sexual orientation, or gender identity.            Thank you!     Thank you for choosing [facility]  for your care. Our goal is always to provide you with excellent care. Hearing back from our patients is one way we can continue to improve our services. Please take a few minutes to complete the written survey that you may receive in the mail after your visit with us. Thank you!             Your Updated Medication List - Protect others around you: Learn how to safely use, store and throw away your medicines at www.disposemymeds.org.          This list is accurate as of 10/7/18  1:06 PM.  Always use your most recent med list.                   Brand Name Dispense Instructions for use Diagnosis    acetaminophen 500 MG Caps     60 capsule    Take 500 mg by mouth every 4 hours as needed        ADVAIR DISKUS  250-50 MCG/DOSE diskus inhaler   Generic drug:  fluticasone-salmeterol      Inhale 1 puff into the lungs        * albuterol 108 (90 Base) MCG/ACT inhaler    PROAIR HFA/PROVENTIL HFA/VENTOLIN HFA     Inhale 2 puffs into the lungs every 4 hours as needed        * albuterol (2.5 MG/3ML) 0.083% neb solution      Inhale 2.5 mg into the lungs        ALPRAZolam 1 MG 24 hr tablet    XANAX XR     Take 1 mg by mouth every morning        atorvastatin 40 MG tablet    LIPITOR    90 tablet    TAKE 1 TABLET (40 MG) BY MOUTH DAILY    Mixed hyperlipidemia       azithromycin 250 MG tablet    ZITHROMAX    6 tablet    Two tabs today.  One tab daily x 4 days.    Upper respiratory tract infection, unspecified type       budesonide 0.5 MG/2ML neb solution    PULMICORT     Take 0.5 mg by nebulization 2 times daily        celecoxib 200 MG capsule    celeBREX    180 capsule    TAKE 1 CAPSULE BY MOUTH TWICE DAILY AS NEEDED FORMODERATE PAIN    Chronic midline low back pain without sciatica       cetirizine 10 MG tablet    zyrTEC    30 tablet    Take 1 tablet (10 mg) by mouth At Bedtime    Cough       cyanocobalamin 1000 MCG/ML injection    VITAMIN B12    10 mL    Inject 1 mL (1,000 mcg) into the muscle every 30 days    B12 deficiency       DELSYM PO           EPINEPHrine 0.3 MG/0.3ML injection 2-pack    EPIPEN/ADRENACLICK/or ANY BX GENERIC EQUIV    0.6 mL    Inject 0.3 mLs (0.3 mg) into the muscle once as needed for anaphylaxis    Food allergy       ginger root 550 MG Caps capsule      Take 550 mg by mouth Up to three times daily        hydrOXYzine 50 MG tablet    ATARAX     Take 50 mg by mouth daily        INFLIXIMAB IV           lamoTRIgine 25 MG tablet    LaMICtal     Take 200 mg by mouth daily        levothyroxine 75 MCG tablet    SYNTHROID/LEVOTHROID    90 tablet    TAKE 1 TABLET (75 MCG) BY MOUTH EVERY MORNING.    Acquired hypothyroidism       lidocaine (viscous) 2 % solution    XYLOCAINE    100 mL    Take 15 mLs by mouth every 2 hours  as needed for moderate pain max 8 doses/24 hour period    Throat pain       loperamide 2 MG capsule    IMODIUM     Take 2 mg by mouth 4 times daily as needed for diarrhea        metoprolol succinate 200 MG 24 hr tablet    TOPROL-XL    180 tablet    Take 2 tablets (400 mg) by mouth daily    Essential hypertension with goal blood pressure less than 140/90       montelukast 10 MG tablet    SINGULAIR     Take 10 mg by mouth        omega-3 acid ethyl esters 1 g capsule    Lovaza    360 capsule    TAKE 2 CAPSULES BY MOUTH TWICE DAILY.    Hypertriglyceridemia       omeprazole 20 MG tablet      Take 20 mg by mouth daily        order for DME     12 each    Equipment being ordered: 1 ml tuberculin syringes to be used for Vitamin B12 injections.    Vitamin B12 deficiency (non anaemic)       order for DME     1 Units    SI-loc belt    SI (sacroiliac) joint dysfunction       order for DME      [employer] REMSTAR 60 Series Auto CPAP 10-12 cm H2O, Wisp nasal mask w/a large cushion and a chinstrap        oxyCODONE IR 5 MG tablet    ROXICODONE    35 tablet    Take 1-2 tablets (5-10 mg) by mouth every 6 hours as needed for pain (maximum 4 tablet(s) per day)    Facet arthropathy       predniSONE 20 MG tablet    DELTASONE    15 tablet    Take 3 tablets (60 mg) by mouth daily    Upper respiratory tract infection, unspecified type       pregabalin 50 MG capsule    LYRICA    60 capsule    Take 1 capsule (50 mg) by mouth 2 times daily    Chronic midline low back pain without sciatica       pseudoePHEDrine 120 MG 12 hr tablet    SUDAFED    28 tablet    Take 1 tablet (120 mg) by mouth every 12 hours    Nasal congestion       ramipril 10 MG capsule    ALTACE    90 capsule    Take 1 capsule (10 mg) by mouth daily    Hypertension goal BP (blood pressure) < 140/90       risperiDONE 1 MG tablet    risperDAL     Take 0.5 mg by mouth daily        syringe (disposable) [facility]    BD TUBERCULIN SYRINGE    12 each    Equipment being ordered: 1 ml  tuberculin syringes to be used for Vitamin B12 injections.    B12 deficiency       tiZANidine 4 MG tablet    ZANAFLEX    90 tablet    TAKE 1 TABLET BY MOUTH THREE TIMES DAILY AS NEEDED    Dorsalgia       vitamin D3 52298 UNITS capsule    CHOLECALCIFEROL    24 capsule    Take one capsule every two weeks.    Vitamin D deficiency       * Notice:  This list has 2 medication(s) that are the same as other medications prescribed for you. Read the directions carefully, and ask your doctor or other care provider to review them with you.

## 2018-10-08 NOTE — TELEPHONE ENCOUNTER
Message from [employer]:  Original authorizing provider: MD Tito Dickey SONIALopez Pineda would like a refill of the following medications:  oxyCODONE IR (ROXICODONE) 5 MG tablet [Ksenia Lyles MD]    Preferred pharmacy: [facility]:     Comment:  Thx.

## 2018-10-08 NOTE — TELEPHONE ENCOUNTER
Requested Prescriptions   Pending Prescriptions Disp Refills     oxyCODONE IR (ROXICODONE) 5 MG tablet 35 tablet 0     Sig: Take 1-2 tablets (5-10 mg) by mouth every 6 hours as needed for pain (maximum 4 tablet(s) per day)    There is no refill protocol information for this order        oxyCODONE IR (ROXICODONE) 5 MG tablet      Last Written Prescription Date:  9/18/18  Last Fill Quantity: 35,   # refills: 0  Last Office Visit: 10/2/18  Future Office visit:    Next 5 appointments (look out 90 days)     Oct 16, 2018  2:00 PM CDT   SHORT with Radha Mcdonald [facility] ([facility]    [address] [place] [address] [address]  [address] [place] [address] [phone]                   Routing refill request to provider for review/approval because:  Drug not on the G, P or [facility] refill protocol or controlled substance

## 2018-10-09 NOTE — TELEPHONE ENCOUNTER
Controlled Substance Refill Request for Oxycodone  Problem List Complete:  Yes  Chronic pain syndrome (Chronic)         Problem Detail      Noted:  4/4/2017      Priority:  Medium      Overview Addendum 9/17/2018  1:53 PM by Tea Adam RN     Patient is followed by Ksenia Lyles MD for ongoing prescription of pain medication.  All refills should only be approved by this provider, or covering partner.     Medication(s): oxy 5.   Maximum quantity per month: 40  Clinic visit frequency required: Q 6  months      Controlled substance agreement:      Encounter-Level CSA:      There are no encounter-level csa.       Pain Clinic evaluation in the past: Yes     DIRE Total Score(s):  No flowsheet data found.     Last [facility] website verification:  none, 9/17/18   [email or web address]      Previous Version        checked in past 3 months?  Yes 9/17/18     Last Written Prescription Date:  9/18/18  Last Fill Quantity: 35 tablets  # refills: 0   Last office visit: 10/2/2018 with prescribing provider:  10/2/18   Future Office Visit:   Next 5 appointments (look out 90 days)     Oct 16, 2018  2:00 PM CDT   SHORT with Radha Mcdonald [facility]    [address] [address] [place] [address] [place]  [address] [address] [phone]                 RX [place] reviewed:  not reviewed/not due - last done on 9/17/18    [place] profile:  [email or web address]        Martell Chaves RN, BSN

## 2018-10-10 RX ORDER — OXYCODONE HYDROCHLORIDE 5 MG/1
5-10 TABLET ORAL EVERY 6 HOURS PRN
Qty: 35 TABLET | Refills: 0 | Status: SHIPPED | OUTPATIENT
Start: 2018-10-10 | End: 2018-11-13

## 2018-10-11 DIAGNOSIS — G89.4 CHRONIC PAIN SYNDROME: Chronic | ICD-10-CM

## 2018-10-11 PROCEDURE — 99000 SPECIMEN HANDLING OFFICE-LAB: CPT | Performed by: FAMILY MEDICINE

## 2018-10-11 PROCEDURE — 80307 DRUG TEST PRSMV CHEM ANLYZR: CPT | Mod: 90 | Performed by: FAMILY MEDICINE

## 2018-10-14 ENCOUNTER — MYC REFILL (OUTPATIENT)
Dept: FAMILY MEDICINE | Facility: CLINIC | Age: 43
End: 2018-10-14

## 2018-10-14 DIAGNOSIS — I10 ESSENTIAL HYPERTENSION WITH GOAL BLOOD PRESSURE LESS THAN 140/90: Chronic | ICD-10-CM

## 2018-10-14 DIAGNOSIS — E03.9 ACQUIRED HYPOTHYROIDISM: ICD-10-CM

## 2018-10-14 RX ORDER — METOPROLOL SUCCINATE 200 MG/1
400 TABLET, EXTENDED RELEASE ORAL DAILY
Qty: 180 TABLET | Refills: 0 | Status: CANCELLED | OUTPATIENT
Start: 2018-10-14

## 2018-10-15 NOTE — TELEPHONE ENCOUNTER
"Requested Prescriptions   Pending Prescriptions Disp Refills     levothyroxine (SYNTHROID/LEVOTHROID) 75 MCG tablet 90 tablet 0    Thyroid Protocol Passed    10/15/2018  6:20 AM       Passed - Patient is 12 years or older       Passed - Recent (12 mo) or future (30 days) visit within the authorizing provider's specialty    Patient had office visit in the last 12 months or has a visit in the next 30 days with authorizing provider or within the authorizing provider's specialty.  See \"Patient Info\" tab in inbasket, or \"Choose Columns\" in Meds & Orders section of the refill encounter.           Passed - Normal TSH on file in past 12 months    Recent Labs   Lab Test  01/18/18   0834   TSH  1.84              metoprolol succinate (TOPROL-XL) 200 MG 24 hr tablet 180 tablet 0     Sig: Take 2 tablets (400 mg) by mouth daily    Beta-Blockers Protocol Passed    10/15/2018  6:20 AM       Passed - Blood pressure under 140/90 in past 12 months    BP Readings from Last 3 Encounters:   10/07/18 119/77   10/02/18 (!) 142/92   09/19/18 112/82                Passed - Patient is age 6 or older       Passed - Recent (12 mo) or future (30 days) visit within the authorizing provider's specialty    Patient had office visit in the last 12 months or has a visit in the next 30 days with authorizing provider or within the authorizing provider's specialty.  See \"Patient Info\" tab in inbasket, or \"Choose Columns\" in Meds & Orders section of the refill encounter.            levothyroxine (SYNTHROID/LEVOTHROID) 75 MCG tablet  Last Written Prescription Date:  8/2/18  Last Fill Quantity: 90,  # refills: 0   Last office visit: 10/2/2018 with prescribing provider:  Dr. Lyles   Future Office Visit:      metoprolol succinate (TOPROL-XL) 200 MG 24 hr tablet  Last Written Prescription Date:  10/2/18  Last Fill Quantity: 180,  # refills: 0   Last office visit: 10/2/2018 with prescribing provider:  Dr. Lyles   Future Office Visit:      "

## 2018-10-15 NOTE — TELEPHONE ENCOUNTER
Message from MyChart:  Original authorizing provider: Ksenia Lyles MD    Tito Pineda would like a refill of the following medications:  levothyroxine (SYNTHROID/LEVOTHROID) 75 MCG tablet [Ksenia Lyles MD]  metoprolol succinate (TOPROL-XL) 200 MG 24 hr tablet [Ksenia Lyles MD]    Preferred pharmacy: [id number] [address] [address]    Comment:  Thx. Pls note new Metoprolol quantity of #60/month per recent visit with Dr. Lyles.

## 2018-10-17 LAB — COMPREHEN DRUG ANALYSIS UR: NORMAL

## 2018-10-17 RX ORDER — LEVOTHYROXINE SODIUM 75 UG/1
TABLET ORAL
Qty: 90 TABLET | Refills: 0 | Status: SHIPPED | OUTPATIENT
Start: 2018-10-17 | End: 2019-01-11

## 2018-10-17 NOTE — TELEPHONE ENCOUNTER
Prescription approved per [facility].  Metoprolol was sent to pharmacy on 10/2/18.    Sapna Barragan RN, [facility] Triage

## 2018-10-19 ENCOUNTER — OFFICE VISIT (OUTPATIENT)
Dept: PSYCHIATRY | Facility: CLINIC | Age: 43
End: 2018-10-19
Payer: COMMERCIAL

## 2018-10-19 VITALS
DIASTOLIC BLOOD PRESSURE: 84 MMHG | HEART RATE: 84 BPM | SYSTOLIC BLOOD PRESSURE: 141 MMHG | WEIGHT: 315 LBS | BODY MASS INDEX: 40.08 KG/M2 | RESPIRATION RATE: 16 BRPM

## 2018-10-19 DIAGNOSIS — F33.2 SEVERE EPISODE OF RECURRENT MAJOR DEPRESSIVE DISORDER, WITHOUT PSYCHOTIC FEATURES (H): Primary | ICD-10-CM

## 2018-10-19 ASSESSMENT — PAIN SCALES - GENERAL: PAINLEVEL: MILD PAIN (3)

## 2018-10-19 NOTE — MR AVS SNAPSHOT
After Visit Summary   10/19/2018    Joel Pineda    MRN: [id number]           Patient Information     Date Of Birth          1975        Visit Information        Provider Department      10/19/2018 9:30 AM [facility] PROCEDURE ROOM 2 [facility]         Follow-ups after your visit        Your next 10 appointments already scheduled     Oct 22, 2018 10:00 AM CDT   TMS TREATMENT with ME [facility] PROCEDURE ROOM 2   [facility] ([facility])    [address]  [facility] [address]   [phone]            Oct 23, 2018 10:00 AM CDT   TMS TREATMENT with ME [facility] PROCEDURE ROOM 2   [facility] ([facility])    [address]  [facility] [address]   [phone]            Oct 24, 2018 10:00 AM CDT   TMS TREATMENT with ME [facility] PROCEDURE ROOM 2   [facility] ([facility])    [address]  [facility] [address]   [phone]            Oct 25, 2018 10:00 AM CDT   TMS TREATMENT with ME [facility] PROCEDURE ROOM 2   [facility] ([facility])    [address]  [facility] [address]   [phone]            Oct 26, 2018 10:00 AM CDT   TMS TREATMENT with ME [facility] PROCEDURE ROOM 2   [facility] ([facility])    [address]  [facility] [address]   [phone]            Oct 29, 2018 10:00 AM CDT   TMS TREATMENT with ME [facility] PROCEDURE ROOM 2   [facility] ([facility])    [address]  [facility] [address]   [phone]            Oct 30, 2018 10:00 AM CDT   TMS TREATMENT with ME [facility] PROCEDURE ROOM 2   [facility] ([facility])    [address]  [facility] [address]   [phone]            Oct 31, 2018 10:00 AM CDT   TMS TREATMENT with ME [facility] PROCEDURE ROOM 2   [facility] ([facility])    [address]  [facility] [address]    [phone]            Nov 01, 2018 10:00 AM CDT   TMS TREATMENT with ME UMP PROCEDURE ROOM 2   [facility] ([facility])    [address]  [facility] [id number] [phone]            Nov 02, 2018 10:00 AM CDT   TMS TREATMENT with ME UMP PROCEDURE ROOM 2   [facility] ([facility])    [address]  [facility] [id number] [phone]              Who to contact     Please call your clinic at [phone] to:    Ask questions about your health    Make or cancel appointments    Discuss your medicines    Learn about your test results    Speak to your doctor            Additional Information About Your Visit        [facility] Information     [facility] gives you secure access to your electronic health record. If you see a primary care provider, you can also send messages to your care team and make appointments. If you have questions, please call your primary care clinic.  If you do not have a primary care provider, please call [phone] and they will assist you.      [facility] is an electronic gateway that provides easy, online access to your medical records. With [facility], you can request a clinic appointment, read your test results, renew a prescription or communicate with your care team.     To access your existing account, please contact your [facility] or call [phone] for assistance.        Care EveryWhere ID     This is your Care EveryWhere ID. This could be used by other organizations to access your [facility] medical records  [id number]        Your Vitals Were     Pulse Respirations BMI (Body Mass Index)             84 16 40.08 kg/m2          Blood Pressure from Last 3 Encounters:   10/19/18 141/84   10/07/18 119/77   10/02/18 (!) 142/92    Weight from Last 3 Encounters:   10/19/18 (!) 338 lb (153.3 kg)   10/07/18 (!) 339 lb (153.8 kg)   10/02/18 (!) 341 lb (154.7 kg)              Today, you had the  following     No orders found for display       Primary Care Provider Office Phone # Fax #    Ksenia Shady Lyles [phone] [address] [facility] [address]        Goals        General    I will continue to attend Psychiatry appointments for medication management, 1/14/2014 (pt-stated)     Notes - Note edited  8/24/2016  4:04 PM by Dalila Cavazos LSW    As of today's date 8/24/2016 goal is met at 51 - 75%.   Goal Status:  Ongoing Sees  Therapist every other week and Psych PA every 3 weeks.  As of today's date 5/25/2016 goal is met at 51 - 75%.   Goal Status:  Showing progress-  Meeting with Psych PA for second time tomorrow  To review meds As of today's date 4/11/2016 goal is met at 51 - 75%.   Goal Status:  Showing progress  As of today's date 1/14/2014 goal is met at 51 - 75%.   Goal Status:  Active        I will schedule therapy with new counselor, 1/14/2014 (pt-stated)     Notes - Note edited  5/25/2016  1:54 PM by Dalila Cavazos LSW    As of today's date 5/25/2016 goal is met at 76 - 100%.   Goal Status:  Ongoing New therapist and Psych PA on a regular basis  As of today's date 5/10/2016 goal is met at 76 - 100%.   Goal Status:  Ongoing met with new therapist at [facility]  As of today's date 4/11/2016 goal is met at 51 - 75%.   Goal Status:  Ongoing meets with therapist regularly  As of today's date 1/14/2014 goal is met at 0 - 25%.   Goal Status:  Active        Psychosocial I need some help with cleaning (pt-stated)     Notes - Note edited  6/23/2016  1:57 PM by Dalila Cavazos LSW    As of today's date 6/23/2016 goal is met at 26 - 50%.   Goal Status:  Showing progress met with [facility]. Has not yet made a decision  As of today's date 5/25/2016 goal is met at 0 - 25%.   Goal Status:  Ongoing agency had to reschedule appt.  As of today's date 5/10/2016 goal is met at 0 - 25%.   Goal Status:  Active referral to [facility]        Equal Access to Services     ANNA  GAAR : Hadii aad ku hadvincent Darby, waaxda luqadaha, qaybta kaalmada lynette, isabell lovettn marychuymaribel monson laCiromaureen . So [facility] [phone].    ATENCIÓN: Si habla español, tiene a faustin disposición servicios gratuitos de asistencia lingüística. Llame al [phone].    We comply with applicable federal civil rights laws and [place] laws. We do not discriminate on the basis of race, color, national origin, age, disability, sex, sexual orientation, or gender identity.            Thank you!     Thank you for choosing [facility]  for your care. Our goal is always to provide you with excellent care. Hearing back from our patients is one way we can continue to improve our services. Please take a few minutes to complete the written survey that you may receive in the mail after your visit with us. Thank you!             Your Updated Medication List - Protect others around you: Learn how to safely use, store and throw away your medicines at www.disposemymeds.org.          This list is accurate as of 10/19/18 11:28 AM.  Always use your most recent med list.                   Brand Name Dispense Instructions for use Diagnosis    acetaminophen 500 MG Caps     60 capsule    Take 500 mg by mouth every 4 hours as needed        ADVAIR DISKUS 250-50 MCG/DOSE diskus inhaler   Generic drug:  fluticasone-salmeterol      Inhale 1 puff into the lungs        * albuterol 108 (90 Base) MCG/ACT inhaler    PROAIR HFA/PROVENTIL HFA/VENTOLIN HFA     Inhale 2 puffs into the lungs every 4 hours as needed        * albuterol (2.5 MG/3ML) 0.083% neb solution      Inhale 2.5 mg into the lungs        ALPRAZolam 1 MG 24 hr tablet    XANAX XR     Take 1 mg by mouth every morning        atorvastatin 40 MG tablet    LIPITOR    90 tablet    TAKE 1 TABLET (40 MG) BY MOUTH DAILY    Mixed hyperlipidemia       azithromycin 250 MG tablet    ZITHROMAX    6 tablet    Two tabs today.  One tab daily x 4 days.    Upper respiratory tract infection,  unspecified type       budesonide 0.5 MG/2ML neb solution    PULMICORT     Take 0.5 mg by nebulization 2 times daily        celecoxib 200 MG capsule    celeBREX    180 capsule    TAKE 1 CAPSULE BY MOUTH TWICE DAILY AS NEEDED FORMODERATE PAIN    Chronic midline low back pain without sciatica       cetirizine 10 MG tablet    zyrTEC    30 tablet    Take 1 tablet (10 mg) by mouth At Bedtime    Cough       cyanocobalamin 1000 MCG/ML injection    VITAMIN B12    10 mL    Inject 1 mL (1,000 mcg) into the muscle every 30 days    B12 deficiency       DELSYM PO           EPINEPHrine 0.3 MG/0.3ML injection 2-pack    EPIPEN/ADRENACLICK/or ANY BX GENERIC EQUIV    0.6 mL    Inject 0.3 mLs (0.3 mg) into the muscle once as needed for anaphylaxis    Food allergy       ginger root 550 MG Caps capsule      Take 550 mg by mouth Up to three times daily        hydrOXYzine 50 MG tablet    ATARAX     Take 50 mg by mouth daily        INFLIXIMAB IV           lamoTRIgine 25 MG tablet    LaMICtal     Take 200 mg by mouth daily        levothyroxine 75 MCG tablet    SYNTHROID/LEVOTHROID    90 tablet    TAKE 1 TABLET (75 MCG) BY MOUTH EVERY MORNING.    Acquired hypothyroidism       lidocaine (viscous) 2 % solution    XYLOCAINE    100 mL    Take 15 mLs by mouth every 2 hours as needed for moderate pain max 8 doses/24 hour period    Throat pain       loperamide 2 MG capsule    IMODIUM     Take 2 mg by mouth 4 times daily as needed for diarrhea        metoprolol succinate 200 MG 24 hr tablet    TOPROL-XL    180 tablet    Take 2 tablets (400 mg) by mouth daily    Essential hypertension with goal blood pressure less than 140/90       montelukast 10 MG tablet    SINGULAIR     Take 10 mg by mouth        omega-3 acid ethyl esters 1 g capsule    Lovaza    360 capsule    TAKE 2 CAPSULES BY MOUTH TWICE DAILY.    Hypertriglyceridemia       omeprazole 20 MG tablet      Take 20 mg by mouth daily        order for DME     12 each    Equipment being ordered: 1 ml  tuberculin syringes to be used for Vitamin B12 injections.    Vitamin B12 deficiency (non anaemic)       order for DME     1 Units    SI-loc belt    SI (sacroiliac) joint dysfunction       order for DME      Respironics REMSTAR 60 Series Auto CPAP 10-12 cm H2O, Wisp nasal mask w/a large cushion and a chinstrap        oxyCODONE IR 5 MG tablet    ROXICODONE    35 tablet    Take 1-2 tablets (5-10 mg) by mouth every 6 hours as needed for pain (maximum 4 tablet(s) per day)    Facet arthropathy       predniSONE 20 MG tablet    DELTASONE    15 tablet    Take 3 tablets (60 mg) by mouth daily    Upper respiratory tract infection, unspecified type       pregabalin 50 MG capsule    LYRICA    60 capsule    Take 1 capsule (50 mg) by mouth 2 times daily    Chronic midline low back pain without sciatica       pseudoePHEDrine 120 MG 12 hr tablet    SUDAFED    28 tablet    Take 1 tablet (120 mg) by mouth every 12 hours    Nasal congestion       ramipril 10 MG capsule    ALTACE    90 capsule    Take 1 capsule (10 mg) by mouth daily    Hypertension goal BP (blood pressure) < 140/90       risperiDONE 1 MG tablet    risperDAL     Take 0.5 mg by mouth daily        syringe (disposable) 1 ML Misc    BD TUBERCULIN SYRINGE    12 each    Equipment being ordered: 1 ml tuberculin syringes to be used for Vitamin B12 injections.    B12 deficiency       tiZANidine 4 MG tablet    ZANAFLEX    90 tablet    TAKE 1 TABLET BY MOUTH THREE TIMES DAILY AS NEEDED    Dorsalgia       vitamin D3 73503 UNITS capsule    CHOLECALCIFEROL    24 capsule    Take one capsule every two weeks.    Vitamin D deficiency       * Notice:  This list has 2 medication(s) that are the same as other medications prescribed for you. Read the directions carefully, and ask your doctor or other care provider to review them with you.

## 2018-10-20 ASSESSMENT — PATIENT HEALTH QUESTIONNAIRE - PHQ9: SUM OF ALL RESPONSES TO PHQ QUESTIONS 1-9: 16

## 2018-10-22 ENCOUNTER — OFFICE VISIT (OUTPATIENT)
Dept: PSYCHIATRY | Facility: CLINIC | Age: 43
End: 2018-10-22
Payer: COMMERCIAL

## 2018-10-22 VITALS — SYSTOLIC BLOOD PRESSURE: 130 MMHG | HEART RATE: 71 BPM | DIASTOLIC BLOOD PRESSURE: 85 MMHG

## 2018-10-22 DIAGNOSIS — F33.2 SEVERE EPISODE OF RECURRENT MAJOR DEPRESSIVE DISORDER, WITHOUT PSYCHOTIC FEATURES (H): Primary | ICD-10-CM

## 2018-10-22 NOTE — MR AVS SNAPSHOT
After Visit Summary   10/22/2018    Joel Pineda    MRN: [id number]           Patient Information     Date Of Birth          1975        Visit Information        Provider Department      10/22/2018 10:00 AM [facility] PROCEDURE ROOM 2 P Psychiatry        Today's Diagnoses     Severe episode of recurrent major depressive disorder, without psychotic features (H)    -  1       Follow-ups after your visit        Your next 10 appointments already scheduled     Oct 29, 2018 10:00 AM CDT   TMS TREATMENT with ME U[facility] PROCEDURE ROOM 2   [facility] Psychiatry ([facility])    [address]  [facility] [address]   [phone]            Oct 30, 2018 10:00 AM CDT   TMS TREATMENT with ME UMP PROCEDURE ROOM 2   [facility] Psychiatry ([facility])    [address]  [facility] [address]   [phone]            Oct 31, 2018 10:00 AM CDT   TMS TREATMENT with ME [facility]MP PROCEDURE ROOM 2   [facility] ([facility])    [address]  [facility] [address]   [phone]            Nov 01, 2018 10:00 AM CDT   TMS TREATMENT with ME UMP PROCEDURE ROOM 2   P Psychiatry ([facility])    [address]  [facility] [address]   [phone]            Nov 02, 2018 10:00 AM CDT   TMS TREATMENT with ME UMP PROCEDURE ROOM 2   [facility] ([facility])    [address]  [facility] [address]   [phone]            Nov 05, 2018 10:00 AM CST   TMS TREATMENT with ME UMP PROCEDURE ROOM 2   P Psychiatry ([facility])    [address]  [facility] [address]   [phone]            Nov 06, 2018 10:00 AM CST   TMS TREATMENT with ME UMP PROCEDURE ROOM 2   P Psychiatry ([facility])    [address] [place] [address]   [phone]            Nov 06, 2018  2:00 PM CST   Level 1 with BAY 5 INFUSION   M  [facility] ([facility])    [address] [address] [place] [id number] [phone]            Nov 07, 2018 10:00 AM CST   TMS TREATMENT with ME UMP PROCEDURE ROOM 2   [facility] Psychiat[facility] ([facility])    [address]  [facility] [id number] [phone]            Nov 08, 2018 10:00 AM CST   TMS TREATMENT with ME UMP PROCEDURE ROOM 2   [facility] Psychiat[facility] ([facility])    [address]  [facility] [id number] [phone]              Who to contact     Please call your clinic at [phone] to:    Ask questions about your health    Make or cancel appointments    Discuss your medicines    Learn about your test results    Speak to your doctor            Additional Information About Your Visit        [email or web address] Information     [employer] gives you secure access to your electronic health record. If you see a primary care provider, you can also send messages to your care team and make appointments. If you have questions, please call your primary care clinic.  If you do not have a primary care provider, please call [phone] and they will assist you.      [employer] is an electronic gateway that provides easy, online access to your medical records. With [employer], you can request a clinic appointment, read your test results, renew a prescription or communicate with your care team.     To access your existing account, please contact your [facility] or call [phone] for assistance.        Care EveryWhere ID     This is your Care EveryWhere ID. This could be used by other organizations to access your [facility] medical records  [id number]        Your Vitals Were     Pulse                   71            Blood Pressure from Last 3 Encounters:   10/26/18 130/83   10/25/18 133/81   10/25/18 142/89    Weight from Last 3 Encounters:   10/25/18 (!) 337 lb 9.6 oz (153.1 kg)   10/19/18 (!) 338 lb (153.3 kg)    10/07/18 (!) 339 lb (153.8 kg)              We Performed the Following     C TRANSCRANIAL MAGNETIC STIMULATION TREATMENT,DELIVERY/MANAGEMENT        Primary Care Provider Office Phone # Fax #    Ksenia Shady Lyles [phone] [address] [facility] [address]        Goals        General    I will continue to attend Psychiatry appointments for medication management, 1/14/2014 (pt-stated)     Notes - Note edited  8/24/2016  4:04 PM by Dalila Cavazos LSW    As of today's date 8/24/2016 goal is met at 51 - 75%.   Goal Status:  Ongoing Sees  Therapist every other week and Psych PA every 3 weeks.  As of today's date 5/25/2016 goal is met at 51 - 75%.   Goal Status:  Showing progress-  Meeting with Psych PA for second time tomorrow  To review meds As of today's date 4/11/2016 goal is met at 51 - 75%.   Goal Status:  Showing progress  As of today's date 1/14/2014 goal is met at 51 - 75%.   Goal Status:  Active        I will schedule therapy with new counselor, 1/14/2014 (pt-stated)     Notes - Note edited  5/25/2016  1:54 PM by Dalila Cavazos LSW    As of today's date 5/25/2016 goal is met at 76 - 100%.   Goal Status:  Ongoing New therapist and Psych PA on a regular basis  As of today's date 5/10/2016 goal is met at 76 - 100%.   Goal Status:  Ongoing met with new therapist at [facility]  As of today's date 4/11/2016 goal is met at 51 - 75%.   Goal Status:  Ongoing meets with therapist regularly  As of today's date 1/14/2014 goal is met at 0 - 25%.   Goal Status:  Active        Psychosocial I need some help with cleaning (pt-stated)     Notes - Note edited  6/23/2016  1:57 PM by Dalila Cavazos LSW    As of today's date 6/23/2016 goal is met at 26 - 50%.   Goal Status:  Showing progress met with [facility]. Has not yet made a decision  As of today's date 5/25/2016 goal is met at 0 - 25%.   Goal Status:  Ongoing agency had to reschedule appt.  As of today's date 5/10/2016 goal is  met at 0 - 25%.   Goal Status:  Active referral to [facility]        Equal Access to Services     ANNA CANO : Hadii floyd ashley orquideacrissy Brielleisaac, luisfabrice thompson, ofelia vilmahiteshfabrice ojeda, isabell torres haymaureen matadrakeha echevarria. So [facility] [phone].    ATENCIÓN: Si habla español, tiene a faustin disposición servicios gratuitos de asistencia lingüística. Llame al [phone].    We comply with applicable federal civil rights laws and [place] laws. We do not discriminate on the basis of race, color, national origin, age, disability, sex, sexual orientation, or gender identity.            Thank you!     Thank you for choosing [facility]  for your care. Our goal is always to provide you with excellent care. Hearing back from our patients is one way we can continue to improve our services. Please take a few minutes to complete the written survey that you may receive in the mail after your visit with us. Thank you!             Your Updated Medication List - Protect others around you: Learn how to safely use, store and throw away your medicines at www.disposemymeds.org.          This list is accurate as of 10/22/18 11:59 PM.  Always use your most recent med list.                   Brand Name Dispense Instructions for use Diagnosis    acetaminophen 500 MG Caps     60 capsule    Take 500 mg by mouth every 4 hours as needed        ADVAIR DISKUS 250-50 MCG/DOSE diskus inhaler   Generic drug:  fluticasone-salmeterol      Inhale 1 puff into the lungs        * albuterol 108 (90 Base) MCG/ACT inhaler    PROAIR HFA/PROVENTIL HFA/VENTOLIN HFA     Inhale 2 puffs into the lungs every 4 hours as needed        * albuterol (2.5 MG/3ML) 0.083% neb solution      Inhale 2.5 mg into the lungs        ALPRAZolam 1 MG 24 hr tablet    XANAX XR     Take 1 mg by mouth every morning        atorvastatin 40 MG tablet    LIPITOR    90 tablet    TAKE 1 TABLET (40 MG) BY MOUTH DAILY    Mixed hyperlipidemia       azithromycin 250 MG tablet    ZITHROMAX     6 tablet    Two tabs today.  One tab daily x 4 days.    Upper respiratory tract infection, unspecified type       budesonide 0.5 MG/2ML neb solution    PULMICORT     Take 0.5 mg by nebulization 2 times daily        celecoxib 200 MG capsule    celeBREX    180 capsule    TAKE 1 CAPSULE BY MOUTH TWICE DAILY AS NEEDED FORMODERATE PAIN    Chronic midline low back pain without sciatica       cetirizine 10 MG tablet    zyrTEC    30 tablet    Take 1 tablet (10 mg) by mouth At Bedtime    Cough       cyanocobalamin 1000 MCG/ML injection    VITAMIN B12    10 mL    Inject 1 mL (1,000 mcg) into the muscle every 30 days    B12 deficiency       DELSYM PO           EPINEPHrine 0.3 MG/0.3ML injection 2-pack    EPIPEN/ADRENACLICK/or ANY BX GENERIC EQUIV    0.6 mL    Inject 0.3 mLs (0.3 mg) into the muscle once as needed for anaphylaxis    Food allergy       ginger root 550 MG Caps capsule      Take 550 mg by mouth Up to three times daily        hydrOXYzine 50 MG tablet    ATARAX     Take 50 mg by mouth daily        INFLIXIMAB IV           lamoTRIgine 25 MG tablet    LaMICtal     Take 200 mg by mouth daily        levothyroxine 75 MCG tablet    SYNTHROID/LEVOTHROID    90 tablet    TAKE 1 TABLET (75 MCG) BY MOUTH EVERY MORNING.    Acquired hypothyroidism       lidocaine (viscous) 2 % solution    XYLOCAINE    100 mL    Take 15 mLs by mouth every 2 hours as needed for moderate pain max 8 doses/24 hour period    Throat pain       loperamide 2 MG capsule    IMODIUM     Take 2 mg by mouth 4 times daily as needed for diarrhea        metoprolol succinate 200 MG 24 hr tablet    TOPROL-XL    180 tablet    Take 2 tablets (400 mg) by mouth daily    Essential hypertension with goal blood pressure less than 140/90       montelukast 10 MG tablet    SINGULAIR     Take 10 mg by mouth        omega-3 acid ethyl esters 1 g capsule    Lovaza    360 capsule    TAKE 2 CAPSULES BY MOUTH TWICE DAILY.    Hypertriglyceridemia       omeprazole 20 MG tablet       Take 20 mg by mouth daily        order for DME     12 each    Equipment being ordered: 1 ml tuberculin syringes to be used for Vitamin B12 injections.    Vitamin B12 deficiency (non anaemic)       order for DME     1 Units    SI-loc belt    SI (sacroiliac) joint dysfunction       order for DME      [employer]ironics REMSTAR 60 Series Auto CPAP 10-12 cm H2O, Wisp nasal mask w/a large cushion and a chinstrap        oxyCODONE IR 5 MG tablet    ROXICODONE    35 tablet    Take 1-2 tablets (5-10 mg) by mouth every 6 hours as needed for pain (maximum 4 tablet(s) per day)    Facet arthropathy       predniSONE 20 MG tablet    DELTASONE    15 tablet    Take 3 tablets (60 mg) by mouth daily    Upper respiratory tract infection, unspecified type       pregabalin 50 MG capsule    LYRICA    60 capsule    Take 1 capsule (50 mg) by mouth 2 times daily    Chronic midline low back pain without sciatica       pseudoePHEDrine 120 MG 12 hr tablet    SUDAFED    28 tablet    Take 1 tablet (120 mg) by mouth every 12 hours    Nasal congestion       ramipril 10 MG capsule    ALTACE    90 capsule    Take 1 capsule (10 mg) by mouth daily    Hypertension goal BP (blood pressure) < 140/90       risperiDONE 1 MG tablet    risperDAL     Take 0.5 mg by mouth daily        syringe (disposable) 1 ML Misc    BD TUBERCULIN SYRINGE    12 each    Equipment being ordered: 1 ml tuberculin syringes to be used for Vitamin B12 injections.    B12 deficiency       tiZANidine 4 MG tablet    ZANAFLEX    90 tablet    TAKE 1 TABLET BY MOUTH THREE TIMES DAILY AS NEEDED    Dorsalgia       vitamin D3 63631 UNITS capsule    CHOLECALCIFEROL    24 capsule    Take one capsule every two weeks.    Vitamin D deficiency       * Notice:  This list has 2 medication(s) that are the same as other medications prescribed for you. Read the directions carefully, and ask your doctor or other care provider to review them with you.

## 2018-10-22 NOTE — PROGRESS NOTES
[facility] TMS Program  [address], [address]  [place] [address]  TMS Procedure Note        Pre-Procedure:  History and Physical: Reviewed in medical record  Consent Signed by:Joel Pineda  On: 10/19/18  Reviewed possible side effects associated with treatment as well as questions regarding possible course of treatments. Pt agreed to procedure and was able to reflect implications.     Clinical Narrative:  Pt presents to start an acute course of rTMS for management of his symptoms of resistant depression. HE endorses low mood, anhedonia, fatigue, sleep disturbance, appetite disturbance, concentration difficulties, self-derogatory thoughts, and passive suicidal ideation without plan or intent. He is also very anxious about undergoing the procedure. He has not taken his xanax nor lyrica this morning.      Indications for TMS:  MDD, recurrent, severe; 4+ medication trials (from 2+ classes) ineffective; Psychotherapy ineffective.      Pre-Procedure Diagnosis:  MDD, recurrent, severe 296.33     Treatment Hx:  Treatment number this series:              1  Total lifetime treatment number: 1         Pause for the Cause  Right patient:  Yes  Right procedure/correct coil:  Yes; rTMS; cpt 94354; F8 coil.   Earplugs in place:  Yes     Procedure  Patient was seated in procedure chair. Identity and procedure was verified. Ear plugs were placed in ears and patient-specific cap was placed on head and tightened appropriately. Cranial landmarks were established. Coil was placed at C3 and stimulator was set to initial 65%.  Mapping pulses were delivered and response was quantified by visual inspection.  MT was found with specific location and energy detailed below. Coil was then placed at treatment location and stimulator was set to parameters described below. A test train was delivered and pt tolerated train, however, pt was unable to tolerate stimuli at this energy. Energy was decreased to 50% which pt was not able to  tolerate. He became nauseous and anxious. Discussed at length the need to try a different protocol and not to give up on TMS quickly, knowing that most patients get to tolerate the treatment as sessions go by.      Motor Threshold Determination  Distance from nasion to inion:   Tragus to tragus:   Heat circumference:   MT 1: 65%     Switch to TMS 1 Hza to F4 at 50% for 2 minutes. Able to tolerate     Stimulation Parameters  Frequency: 10 Hz                                                  Train duration: 4 sec  Total pulses delivered: 3000  Inter-train interval: 20 sec  Tx Loc: F3  Energy:65%  Trains: 75 trains     Psychiatric Examination  Appearance:  awake, alert, appeared as age stated and neatly groomed  Attitude:  cooperative  Eye Contact:  good  Mood:  depressed  Affect:  full range and reactive  Speech:  clear, coherent and normal prosody  Psychomotor Behavior:  no evidence of tardive dyskinesia, dystonia, or tics and intact station, gait and muscle tone  Thought Process:  logical, linear and goal oriented  Associations:  no loose associations  Thought Content:  passive suicidal ideation present  Insight:  good  Judgment:  intact  Oriented to:  time, person, and place  Attention Span and Concentration:  intact  Recent and Remote Memory:  intact  Language: Able to name objects, Able to repeat phrases and Able to read and write  Fund of Knowledge: appropriate  Muscle Strength and Tone: normal  Gait and Station: Normal     Post-Procedure Diagnosis:  MDD, recurrent, severe 296.33     Plan   - Cont TMS at 1Hz  - Increase energy as tolerated     I did not see patient but remained available in the clinic throughout the treatment.    Cleo Brito MD

## 2018-10-22 NOTE — PROGRESS NOTES
Tito,  Your urine screen was normal and as expected - showing the medication as prescribed.  As part of [place]'s new monitoring policy we will periodically recheck this test.  JA Lyles M.D.

## 2018-10-23 ENCOUNTER — OFFICE VISIT (OUTPATIENT)
Dept: PSYCHIATRY | Facility: CLINIC | Age: 43
End: 2018-10-23
Payer: COMMERCIAL

## 2018-10-23 VITALS — DIASTOLIC BLOOD PRESSURE: 93 MMHG | HEART RATE: 85 BPM | SYSTOLIC BLOOD PRESSURE: 142 MMHG

## 2018-10-23 DIAGNOSIS — F33.2 SEVERE EPISODE OF RECURRENT MAJOR DEPRESSIVE DISORDER, WITHOUT PSYCHOTIC FEATURES (H): Primary | ICD-10-CM

## 2018-10-23 ASSESSMENT — PATIENT HEALTH QUESTIONNAIRE - PHQ9: SUM OF ALL RESPONSES TO PHQ QUESTIONS 1-9: 17

## 2018-10-23 NOTE — PROGRESS NOTES
[facility] TMS Program  [address], [address]  [place] [address]  TMS Procedure Note        Pre-Procedure:  History and Physical: Reviewed in medical record  Consent Signed by:Joel Pineda  On: 10/19/18  Reviewed possible side effects associated with treatment as well as questions regarding possible course of treatments. Pt agreed to procedure and was able to reflect implications.     Clinical Narrative:  Patient tolerating treatment well.     Indications for TMS:  MDD, recurrent, severe; 4+ medication trials (from 2+ classes) ineffective; Psychotherapy ineffective.      Pre-Procedure Diagnosis:  MDD, recurrent, severe 296.33     Treatment Hx:  Treatment number this series: 2  Total lifetime treatment number: 2         Pause for the Cause  Right patient:  Yes  Right procedure/correct coil:  Yes; rTMS; cpt 98374; H1 coil.   Earplugs in place:  Yes    Procedure  Patient was seated in procedure chair. Identity and procedure was verified. Ear plugs were placed in ears and patient-specific cap was placed on head and tightened appropriately. Ruler locations were verified. Coil was placed at treatment location and stimulator was set to parameters described below. A test train was delivered and pt tolerated train. Given pt tolerance, 50 treatment trains were delivered. Pt tolerated procedure well with some right eyebrow movements.     Motor Threshold Determination  Distance from nasion to inion:   Tragus to tragus:   Heat circumference:   MT 1: 65%     Switch to TMS 1 Hza to F4 at 50% for 2 minutes. Able to tolerate     Stimulation Parameters  Frequency: 1 Hz                                                  Train duration: 60 sec  Total pulses delivered: 3000  Inter-train interval: 1 sec  Tx Loc: F3  Energy: 60% (100% MT)  Trains: 50 trains     Psychiatric Examination  Appearance:  awake, alert, appeared as age stated and neatly groomed  Attitude:  cooperative  Eye Contact:  good  Mood:  depressed  Affect:   full range and reactive  Speech:  clear, coherent and normal prosody  Psychomotor Behavior:  no evidence of tardive dyskinesia, dystonia, or tics and intact station, gait and muscle tone  Thought Process:  logical, linear and goal oriented  Associations:  no loose associations  Thought Content:  passive suicidal ideation present  Insight:  good  Judgment:  intact  Oriented to:  time, person, and place  Attention Span and Concentration:  intact  Recent and Remote Memory:  intact  Language: Able to name objects, Able to repeat phrases and Able to read and write  Fund of Knowledge: appropriate  Muscle Strength and Tone: normal  Gait and Station: Normal     Post-Procedure Diagnosis:  MDD, recurrent, severe 296.33     Plan   - Cont TMS at 1Hz  - Increase energy as tolerated     I didn't see the patient during/after treatment but remained available in clinic during treatment.      Cleo Brito MD  [facility] Neuromodulation

## 2018-10-23 NOTE — PROGRESS NOTES
[facility] TMS Program  [address], [address]  [place] [address]  TMS Procedure Note   Joel Pineda MRN# [id number]  Age: 43 year old year old YOB: 1975    Pre-Procedure:  History and Physical: Reviewed in medical record  Consent Signed by: Joel Pineda  On: 10/19/18    Clinical Narrative:  Patient tolerating treatments, did report headache for most of the day yesterday.  Also, reported fatigue.    Indications for TMS:  MDD, recurrent, severe; 4+ medication trials (from 2+ classes) ineffective; Psychotherapy ineffective.     Pre-Procedure Diagnosis:  MDD, recurrent, severe F33.2    Treatment Hx:  Treatment number this series: 3  Total lifetime treatment number: 3    Allergies   Allergen Reactions     Banana Shortness Of Breath     Pt reports organic Banana is okay.      Nitroglycerin Palpitations     Penicillins Anaphylaxis     Provigil [Modafinil] Shortness Of Breath     headache     Ciprofloxacin Palpitations, Other (See Comments) and Rash     dizziness (versus metronidazole taken at same time)     Gadolinium Hives and Itching     Patient was premedicated for the contrast allergy. He did still have a reaction a few hours after injection. Hives and itching. Dr. Gomez told tech to inform pt he should only have contrast again in the future when premedicated and at a hospital. Not at an outpatient facility.      Dulera Other (See Comments)     Hoarse voice     Dye [Contrast Dye] Other (See Comments) and Hives     Moderate flushing, CT contrast     Levaquin Other (See Comments)     tendonitis     Neurontin [Gabapentin] Hives     Moderate hives     Nortriptyline Hives     Varicella Virus Vaccine Live      Rash     Ciprofloxacin Palpitations     Flagyl [Metronidazole Hcl] Palpitations and Hives     Latex Rash     Metronidazole Palpitations, Other (See Comments) and Rash     dizziness (versus ciprofloxacin taken at same time)     No Clinical Screening - See Comments Rash     Nitrile  gloves      BP (!) 142/93 (BP Location: Right arm, Patient Position: Sitting, Cuff Size: Adult Large)  Pulse 85    Pause for the Cause  Right patient:  Yes  Right procedure/correct coil:  Yes; rTMS; cpt 51799; F8 coil.   Earplugs in place:  Yes    Procedure  Patient was seated in procedure chair.  Identity and procedure was verified.  Ear plugs were placed in ears and patient-specific cap was placed on head and tightened appropriately.  Ruler locations were verified.  Bone conducting headphones were not used. Coil was placed at F4 and stimulator was set to 55% (84% of MT) for 5 trains.  Then stimulator was set to 60% (93% MT) for 5 trains.  Then stimulator was set to 65% (100% MT) for 7 trains.  Then stimulator was set to 67% (103% MT)  For the remiaining 33 trains.  Pt tolerated procedure well with forehead movement but no other motor movement.    Motor Threshold Determination  Distance from nasion to inion:   Tragus to tragus distance:   Head circumference:   MT 1: C3 @ 65% on 10/19/18    Stimulation Parameters  Frequency: 1 Hz     Train duration: 60 sec  Total pulses delivered: 1980  Inter-train interval: 1 sec  Tx Loc: F4  Energy: 67% (103% MT)  Trains: 50 trains        Post-Procedure Diagnosis:  MDD, recurrent, severe F33.2    Heather Zamorano  [facility]  [facility] Neuromodulation    Plan   - Cont TMS  - Increase energy as tolerated       I didn t see the patient during/after treatment but remained available in the clinic during  treatment.    Lynne Mohr MD  [facility] Neuromodulation

## 2018-10-23 NOTE — MR AVS SNAPSHOT
After Visit Summary   10/23/2018    Joel Pineda    MRN: [id number]           Patient Information     Date Of Birth          1975        Visit Information        Provider Department      10/23/2018 10:00 AM [facility] PROCEDURE ROOM 2 [facility] Psychiat[facility]        Today's Diagnoses     Severe episode of recurrent major depressive disorder, without psychotic features (H)    -  1       Follow-ups after your visit        Your next 10 appointments already scheduled     Oct 25, 2018 10:00 AM CDT   TMS TREATMENT with [facility] PROCEDURE ROOM 2   [facility] Psychiatry ([facility])    [address]  [facility] [address]   [phone]            Oct 26, 2018 10:00 AM CDT   TMS TREATMENT with ME [facility] PROCEDURE ROOM 2   [facility] ([facility])    [address]  [facility] [address]   [phone]            Oct 29, 2018 10:00 AM CDT   TMS TREATMENT with ME [facility] PROCEDURE ROOM 2   [facility] ([facility])    [address]  [facility] [address]   [phone]            Oct 30, 2018 10:00 AM CDT   TMS TREATMENT with ME [facility] PROCEDURE ROOM 2   [facility] ([facility])    [address]  [facility] [address]   [phone]            Oct 31, 2018 10:00 AM CDT   TMS TREATMENT with ME [facility] PROCEDURE ROOM 2   [facility] Psychiatry ([facility])    [address]  [facility] [address]   [phone]            Nov 01, 2018 10:00 AM CDT   TMS TREATMENT with ME [facility] PROCEDURE ROOM 2   [facility] ([facility])    [address]  [facility] [address]   [phone]            Nov 02, 2018 10:00 AM CDT   TMS TREATMENT with ME [facility] PROCEDURE ROOM 2   [facility] ([facility])    [address]  [facility] [address]   [phone]            Nov 05, 2018 10:00 AM CST   TMS TREATMENT with ME [facility] PROCEDURE  ROOM 2   [facility] Psychiatry ([facility])    [address]  [facility] [address]   [phone]            Nov 06, 2018 10:00 AM CST   TMS TREATMENT with ME [facility] PROCEDURE ROOM 2   [facility] Psychiatry ([facility])    [address]  [facility] [address]   [phone]            Nov 06, 2018  2:00 PM CST   Level 1 with BAY [address] [place] [address] ([facility])    [address] [place] [address] [phone]              Who to contact     Please call your clinic at [phone] to:    Ask questions about your health    Make or cancel appointments    Discuss your medicines    Learn about your test results    Speak to your doctor            Additional Information About Your Visit        [employer] Information     [employer] gives you secure access to your electronic health record. If you see a primary care provider, you can also send messages to your care team and make appointments. If you have questions, please call your primary care clinic.  If you do not have a primary care provider, please call [phone] and they will assist you.      [employer] is an electronic gateway that provides easy, online access to your medical records. With [employer], you can request a clinic appointment, read your test results, renew a prescription or communicate with your care team.     To access your existing account, please contact your [facility] or call [phone] for assistance.        Care EveryWhere ID     This is your Care EveryWhere ID. This could be used by other organizations to access your [place] medical records  [id number]        Your Vitals Were     Pulse                   85            Blood Pressure from Last 3 Encounters:   10/24/18 140/87   10/23/18 (!) 142/93   10/22/18 130/85    Weight from Last 3 Encounters:   10/19/18 (!) 153.3 kg (338 lb)   10/07/18 (!) 153.8 kg (339 lb)    10/02/18 (!) 154.7 kg (341 lb)              We Performed the Following     C TRANSCRANIAL MAGNETIC STIMULATION TREATMENT,DELIVERY/MANAGEMENT        Primary Care Provider Office Phone # Fax #    Ksenia Shady Lyles [phone] [address] [facility] [address]        Goals        General    I will continue to attend Psychiatry appointments for medication management, 1/14/2014 (pt-stated)     Notes - Note edited  8/24/2016  4:04 PM by Dalila Cavazos LSW    As of today's date 8/24/2016 goal is met at 51 - 75%.   Goal Status:  Ongoing Sees  Therapist every other week and Psych PA every 3 weeks.  As of today's date 5/25/2016 goal is met at 51 - 75%.   Goal Status:  Showing progress-  Meeting with Psych PA for second time tomorrow  To review meds As of today's date 4/11/2016 goal is met at 51 - 75%.   Goal Status:  Showing progress  As of today's date 1/14/2014 goal is met at 51 - 75%.   Goal Status:  Active        I will schedule therapy with new counselor, 1/14/2014 (pt-stated)     Notes - Note edited  5/25/2016  1:54 PM by Dalila Cavazos LSW    As of today's date 5/25/2016 goal is met at 76 - 100%.   Goal Status:  Ongoing New therapist and Psych PA on a regular basis  As of today's date 5/10/2016 goal is met at 76 - 100%.   Goal Status:  Ongoing met with new therapist at [facility]  As of today's date 4/11/2016 goal is met at 51 - 75%.   Goal Status:  Ongoing meets with therapist regularly  As of today's date 1/14/2014 goal is met at 0 - 25%.   Goal Status:  Active        Psychosocial I need some help with cleaning (pt-stated)     Notes - Note edited  6/23/2016  1:57 PM by Dalila Cavazos LSW    As of today's date 6/23/2016 goal is met at 26 - 50%.   Goal Status:  Showing progress met with [facility]. Has not yet made a decision  As of today's date 5/25/2016 goal is met at 0 - 25%.   Goal Status:  Ongoing agency had to reschedule appt.  As of today's date 5/10/2016 goal is  met at 0 - 25%.   Goal Status:  Active referral to [facility]        Equal Access to Services     ANNA CANO : Hadii floyd ashley orquideacrissy Darby, luisfabrice thompson, ofelia vilmahiteshfabrice ojeda, isabell torres haymaureen matadrakeha echevarria. So [facility] [phone].    ATENCIÓN: Si habla español, tiene a faustin disposición servicios gratuitos de asistencia lingüística. Llame al [phone].    We comply with applicable federal civil rights laws and [place] laws. We do not discriminate on the basis of race, color, national origin, age, disability, sex, sexual orientation, or gender identity.            Thank you!     Thank you for choosing [facility] PSYCHIATRY  for your care. Our goal is always to provide you with excellent care. Hearing back from our patients is one way we can continue to improve our services. Please take a few minutes to complete the written survey that you may receive in the mail after your visit with us. Thank you!             Your Updated Medication List - Protect others around you: Learn how to safely use, store and throw away your medicines at www.disposemymeds.org.          This list is accurate as of 10/23/18 11:59 PM.  Always use your most recent med list.                   Brand Name Dispense Instructions for use Diagnosis    acetaminophen 500 MG Caps     60 capsule    Take 500 mg by mouth every 4 hours as needed        ADVAIR DISKUS 250-50 MCG/DOSE diskus inhaler   Generic drug:  fluticasone-salmeterol      Inhale 1 puff into the lungs        * albuterol 108 (90 Base) MCG/ACT inhaler    PROAIR HFA/PROVENTIL HFA/VENTOLIN HFA     Inhale 2 puffs into the lungs every 4 hours as needed        * albuterol (2.5 MG/3ML) 0.083% neb solution      Inhale 2.5 mg into the lungs        ALPRAZolam 1 MG 24 hr tablet    XANAX XR     Take 1 mg by mouth every morning        atorvastatin 40 MG tablet    LIPITOR    90 tablet    TAKE 1 TABLET (40 MG) BY MOUTH DAILY    Mixed hyperlipidemia       azithromycin 250 MG tablet    ZITHROMAX     6 tablet    Two tabs today.  One tab daily x 4 days.    Upper respiratory tract infection, unspecified type       budesonide 0.5 MG/2ML neb solution    PULMICORT     Take 0.5 mg by nebulization 2 times daily        celecoxib 200 MG capsule    celeBREX    180 capsule    TAKE 1 CAPSULE BY MOUTH TWICE DAILY AS NEEDED FORMODERATE PAIN    Chronic midline low back pain without sciatica       cetirizine 10 MG tablet    zyrTEC    30 tablet    Take 1 tablet (10 mg) by mouth At Bedtime    Cough       cyanocobalamin 1000 MCG/ML injection    VITAMIN B12    10 mL    Inject 1 mL (1,000 mcg) into the muscle every 30 days    B12 deficiency       DELSYM PO           EPINEPHrine 0.3 MG/0.3ML injection 2-pack    EPIPEN/ADRENACLICK/or ANY BX GENERIC EQUIV    0.6 mL    Inject 0.3 mLs (0.3 mg) into the muscle once as needed for anaphylaxis    Food allergy       ginger root 550 MG Caps capsule      Take 550 mg by mouth Up to three times daily        hydrOXYzine 50 MG tablet    ATARAX     Take 50 mg by mouth daily        INFLIXIMAB IV           lamoTRIgine 25 MG tablet    LaMICtal     Take 200 mg by mouth daily        levothyroxine 75 MCG tablet    SYNTHROID/LEVOTHROID    90 tablet    TAKE 1 TABLET (75 MCG) BY MOUTH EVERY MORNING.    Acquired hypothyroidism       lidocaine (viscous) 2 % solution    XYLOCAINE    100 mL    Take 15 mLs by mouth every 2 hours as needed for moderate pain max 8 doses/24 hour period    Throat pain       loperamide 2 MG capsule    IMODIUM     Take 2 mg by mouth 4 times daily as needed for diarrhea        metoprolol succinate 200 MG 24 hr tablet    TOPROL-XL    180 tablet    Take 2 tablets (400 mg) by mouth daily    Essential hypertension with goal blood pressure less than 140/90       montelukast 10 MG tablet    SINGULAIR     Take 10 mg by mouth        omega-3 acid ethyl esters 1 g capsule    Lovaza    360 capsule    TAKE 2 CAPSULES BY MOUTH TWICE DAILY.    Hypertriglyceridemia       omeprazole 20 MG tablet       Take 20 mg by mouth daily        order for DME     12 each    Equipment being ordered: 1 ml tuberculin syringes to be used for Vitamin B12 injections.    Vitamin B12 deficiency (non anaemic)       order for DME     1 Units    SI-loc belt    SI (sacroiliac) joint dysfunction       order for DME      [employer]ics REMSTAR 60 Series Auto CPAP 10-12 cm H2O, Wisp nasal mask w/a large cushion and a chinstrap        oxyCODONE IR 5 MG tablet    ROXICODONE    35 tablet    Take 1-2 tablets (5-10 mg) by mouth every 6 hours as needed for pain (maximum 4 tablet(s) per day)    Facet arthropathy       predniSONE 20 MG tablet    DELTASONE    15 tablet    Take 3 tablets (60 mg) by mouth daily    Upper respiratory tract infection, unspecified type       pregabalin 50 MG capsule    LYRICA    60 capsule    Take 1 capsule (50 mg) by mouth 2 times daily    Chronic midline low back pain without sciatica       pseudoePHEDrine 120 MG 12 hr tablet    SUDAFED    28 tablet    Take 1 tablet (120 mg) by mouth every 12 hours    Nasal congestion       ramipril 10 MG capsule    ALTACE    90 capsule    Take 1 capsule (10 mg) by mouth daily    Hypertension goal BP (blood pressure) < 140/90       risperiDONE 1 MG tablet    risperDAL     Take 0.5 mg by mouth daily        syringe (disposable) 1 ML Misc    BD TUBERCULIN SYRINGE    12 each    Equipment being ordered: 1 ml tuberculin syringes to be used for Vitamin B12 injections.    B12 deficiency       tiZANidine 4 MG tablet    ZANAFLEX    90 tablet    TAKE 1 TABLET BY MOUTH THREE TIMES DAILY AS NEEDED    Dorsalgia       vitamin D3 07276 UNITS capsule    CHOLECALCIFEROL    24 capsule    Take one capsule every two weeks.    Vitamin D deficiency       * Notice:  This list has 2 medication(s) that are the same as other medications prescribed for you. Read the directions carefully, and ask your doctor or other care provider to review them with you.

## 2018-10-24 ENCOUNTER — OFFICE VISIT (OUTPATIENT)
Dept: PSYCHIATRY | Facility: CLINIC | Age: 43
End: 2018-10-24
Payer: COMMERCIAL

## 2018-10-24 VITALS — SYSTOLIC BLOOD PRESSURE: 140 MMHG | DIASTOLIC BLOOD PRESSURE: 87 MMHG | HEART RATE: 80 BPM

## 2018-10-24 DIAGNOSIS — F33.2 SEVERE EPISODE OF RECURRENT MAJOR DEPRESSIVE DISORDER, WITHOUT PSYCHOTIC FEATURES (H): Primary | ICD-10-CM

## 2018-10-24 ASSESSMENT — PATIENT HEALTH QUESTIONNAIRE - PHQ9: SUM OF ALL RESPONSES TO PHQ QUESTIONS 1-9: 17

## 2018-10-24 NOTE — PROGRESS NOTES
[facility]  [address], [address]  [place] [address]  TMS Procedure Note   Joel Pineda MRN# [id number]  Age: 43 year old year old YOB: 1975    Pre-Procedure:  History and Physical: Reviewed in medical record  Consent Signed by: Joel Pineda  On: 10/19/18    Clinical Narrative:  Patient tolerating treatments, did report headache for most of the day yesterday.  Also, reported fatigue is a little better.    Indications for TMS:  MDD, recurrent, severe; 4+ medication trials (from 2+ classes) ineffective; Psychotherapy ineffective.     Pre-Procedure Diagnosis:  MDD, recurrent, severe F33.2    Treatment Hx:  Treatment number this series: 4  Total lifetime treatment number: 4    Allergies   Allergen Reactions     Banana Shortness Of Breath     Pt reports organic Banana is okay.      Nitroglycerin Palpitations     Penicillins Anaphylaxis     Provigil [Modafinil] Shortness Of Breath     headache     Ciprofloxacin Palpitations, Other (See Comments) and Rash     dizziness (versus metronidazole taken at same time)     Gadolinium Hives and Itching     Patient was premedicated for the contrast allergy. He did still have a reaction a few hours after injection. Hives and itching. Dr. Gomez told tech to inform pt he should only have contrast again in the future when premedicated and at a hospital. Not at an outpatient facility.      Dulera Other (See Comments)     Hoarse voice     Dye [Contrast Dye] Other (See Comments) and Hives     Moderate flushing, CT contrast     Levaquin Other (See Comments)     tendonitis     Neurontin [Gabapentin] Hives     Moderate hives     Nortriptyline Hives     Varicella Virus Vaccine Live      Rash     Ciprofloxacin Palpitations     Flagyl [Metronidazole Hcl] Palpitations and Hives     Latex Rash     Metronidazole Palpitations, Other (See Comments) and Rash     dizziness (versus ciprofloxacin taken at same time)     No Clinical Screening - See Comments  Rash     Nitrile gloves      /87 (BP Location: Right arm, Patient Position: Sitting, Cuff Size: Adult Large)  Pulse 80    Pause for the Cause  Right patient:  Yes  Right procedure/correct coil:  Yes; rTMS; cpt 66991; F8 coil.   Earplugs in place:  Yes    Procedure  Patient was seated in procedure chair.  Identity and procedure was verified.  Ear plugs were placed in ears and patient-specific cap was placed on head and tightened appropriately.  Ruler locations were verified.  Bone conducting headphones were not used. Coil was placed at F4 and stimulator was set to 67% (103 MT) for 11 trains.  Then stimulator was set to 69% (106% MT) for 11 trains.  Then stimulator was set to 72% (110% MT)  For the remiaining 28 trains.  Pt tolerated procedure well with forehead movement but no other motor movement.    Motor Threshold Determination  Distance from nasion to inion:   Tragus to tragus distance:   Head circumference:   MT 1: C3 @ 65% on 10/19/18    Stimulation Parameters  Frequency: 1 Hz     Train duration: 60 sec  Total pulses delivered: 1980  Inter-train interval: 1 sec  Tx Loc: F4  Energy: 72% (110% MT)  Trains: 50 trains        Post-Procedure Diagnosis:  MDD, recurrent, severe F33.2    Micheline Whiteside  [facility]  [facility]    Plan   - Cont TMS  -ReMT Friday      JOCELYN Viera CNP  [facility]  [facility]

## 2018-10-24 NOTE — MR AVS SNAPSHOT
After Visit Summary   10/24/2018    Joel Pineda    MRN: [id number]           Patient Information     Date Of Birth          1975        Visit Information        Provider Department      10/24/2018 10:00 AM [facility] PROCEDURE ROOM 2 [facility] Psych[facility]        Today's Diagnoses     Severe episode of recurrent major depressive disorder, without psychotic features (H)    -  1       Follow-ups after your visit        Your next 10 appointments already scheduled     Nov 28, 2018  2:10 PM CST   (Arrive by 1:55 PM)   [facility] Shady Doherty, PT   [facility] ([facility])    [address] [address]   [phone]            Dec 05, 2018  2:10 PM CST   [facility] Shady [facility], PT   [facility] ([facility]    [address] [address]   [phone]            Dec 14, 2018  1:00 PM CST   Level 1 with [address] ([facility])    [address] [place] [id number] [phone]            Jan 30, 2019  1:20 PM CST   [facility] with Oneil Baxter MD   [facility]    [address] [facility] [address]   [phone]              Who to contact     Please call your clinic at [phone] to:    Ask questions about your health    Make or cancel appointments    Discuss your medicines    Learn about your test results    Speak to your doctor            Additional Information About Your Visit        [employer] Information     [employer] gives you secure access to your electronic health record. If you see a primary care provider, you can also send messages to your care team and make appointments. If you have questions, please call your primary care clinic.  If you do not have a primary care provider, please call [phone] and they will assist you.      [employer] is an electronic gateway that provides easy, online access to  your medical records. With [employer], you can request a clinic appointment, read your test results, renew a prescription or communicate with your care team.     To access your existing account, please contact your [facility] or call [phone] for assistance.        Care EveryWhere ID     This is your Care EveryWhere ID. This could be used by other organizations to access your [place] medical records  [id number]        Your Vitals Were     Pulse                   80            Blood Pressure from Last 3 Encounters:   11/20/18 142/79   11/16/18 117/74   11/15/18 138/88    Weight from Last 3 Encounters:   11/20/18 (!) 150.6 kg (332 lb)   11/16/18 (!) 153.9 kg (339 lb 4.8 oz)   11/15/18 (!) 153.8 kg (339 lb)              We Performed the Following     C TRANSCRANIAL MAGNETIC STIMULATION TREATMENT,DELIVERY/MANAGEMENT        Primary Care Provider Office Phone # Fax #    Ksenia Shady Lyles [phone] [address] [facility] [address]        Equal Access to Services     [facility]: Hadii aad ku hadasho Soomaali, waaxda luqadaha, qaybta kaalmada adeegyada, waxtammi shankar . So [facility] [phone].    ATENCIÓN: Si habla español, tiene a faustin disposición servicios gratuitos de asistencia lingüística. Llame al [phone].    We comply with applicable federal civil rights laws and [place] laws. We do not discriminate on the basis of race, color, national origin, age, disability, sex, sexual orientation, or gender identity.            Thank you!     Thank you for choosing [facility]  for your care. Our goal is always to provide you with excellent care. Hearing back from our patients is one way we can continue to improve our services. Please take a few minutes to complete the written survey that you may receive in the mail after your visit with us. Thank you!             Your Updated Medication List - Protect others around you:  Learn how to safely use, store and throw away your medicines at www.disposemymeds.org.          This list is accurate as of 10/24/18 11:59 PM.  Always use your most recent med list.                   Brand Name Dispense Instructions for use Diagnosis    acetaminophen 500 MG Caps     60 capsule    Take 500 mg by mouth every 4 hours as needed        ADVAIR DISKUS 250-50 MCG/DOSE inhaler   Generic drug:  fluticasone-salmeterol      Inhale 1 puff into the lungs        albuterol 108 (90 Base) MCG/ACT inhaler    PROAIR HFA/PROVENTIL HFA/VENTOLIN HFA     Inhale 2 puffs into the lungs every 4 hours as needed        atorvastatin 40 MG tablet    LIPITOR    90 tablet    TAKE 1 TABLET (40 MG) BY MOUTH DAILY    Mixed hyperlipidemia       celecoxib 200 MG capsule    celeBREX    180 capsule    TAKE 1 CAPSULE BY MOUTH TWICE DAILY AS NEEDED FORMODERATE PAIN    Chronic midline low back pain without sciatica       cetirizine 10 MG tablet    zyrTEC    30 tablet    Take 1 tablet (10 mg) by mouth At Bedtime    Cough       cyanocobalamin 1000 MCG/ML injection    VITAMIN B12    10 mL    Inject 1 mL (1,000 mcg) into the muscle every 30 days    B12 deficiency       EPINEPHrine 0.3 MG/0.3ML injection 2-pack    EPIPEN/ADRENACLICK/or ANY BX GENERIC EQUIV    0.6 mL    Inject 0.3 mLs (0.3 mg) into the muscle once as needed for anaphylaxis    Food allergy       ginger root 550 MG Caps capsule      Take 550 mg by mouth Up to three times daily        INFLIXIMAB IV           levothyroxine 75 MCG tablet    SYNTHROID/LEVOTHROID    90 tablet    TAKE 1 TABLET (75 MCG) BY MOUTH EVERY MORNING.    Acquired hypothyroidism       loperamide 2 MG capsule    IMODIUM     Take 2 mg by mouth 4 times daily as needed for diarrhea        metoprolol succinate 200 MG 24 hr tablet    TOPROL-XL    180 tablet    Take 2 tablets (400 mg) by mouth daily    Essential hypertension with goal blood pressure less than 140/90       montelukast 10 MG tablet    SINGULAIR     Take 10  mg by mouth        omega-3 acid ethyl esters 1 g capsule    Lovaza    360 capsule    TAKE 2 CAPSULES BY MOUTH TWICE DAILY.    Hypertriglyceridemia       omeprazole 20 MG tablet      Take 20 mg by mouth daily        order for DME     1 Units    SI-loc belt    SI (sacroiliac) joint dysfunction       order for DME      [employer]ics REMSTAR 60 Series Auto CPAP 10-12 cm H2O, Wisp nasal mask w/a large cushion and a chinstrap        pseudoePHEDrine 120 MG 12 hr tablet    SUDAFED    28 tablet    Take 1 tablet (120 mg) by mouth every 12 hours    Nasal congestion       ramipril 10 MG capsule    ALTACE    90 capsule    Take 1 capsule (10 mg) by mouth daily    Hypertension goal BP (blood pressure) < 140/90       syringe (disposable) 1 ML Misc    BD TUBERCULIN SYRINGE    12 each    Equipment being ordered: 1 ml tuberculin syringes to be used for Vitamin B12 injections.    B12 deficiency       tiZANidine 4 MG tablet    ZANAFLEX    90 tablet    TAKE 1 TABLET BY MOUTH THREE TIMES DAILY AS NEEDED    Dorsalgia       vitamin D3 51923 UNITS capsule    CHOLECALCIFEROL    24 capsule    Take one capsule every two weeks.    Vitamin D deficiency

## 2018-10-25 ENCOUNTER — OFFICE VISIT (OUTPATIENT)
Dept: PSYCHIATRY | Facility: CLINIC | Age: 43
End: 2018-10-25
Payer: COMMERCIAL

## 2018-10-25 ENCOUNTER — OFFICE VISIT (OUTPATIENT)
Dept: URGENT CARE | Facility: URGENT CARE | Age: 43
End: 2018-10-25
Payer: COMMERCIAL

## 2018-10-25 VITALS
BODY MASS INDEX: 40.03 KG/M2 | OXYGEN SATURATION: 96 % | TEMPERATURE: 98.6 F | DIASTOLIC BLOOD PRESSURE: 81 MMHG | SYSTOLIC BLOOD PRESSURE: 133 MMHG | WEIGHT: 315 LBS | HEART RATE: 82 BPM

## 2018-10-25 VITALS — SYSTOLIC BLOOD PRESSURE: 142 MMHG | DIASTOLIC BLOOD PRESSURE: 89 MMHG | HEART RATE: 78 BPM

## 2018-10-25 DIAGNOSIS — F33.2 SEVERE EPISODE OF RECURRENT MAJOR DEPRESSIVE DISORDER, WITHOUT PSYCHOTIC FEATURES (H): Primary | ICD-10-CM

## 2018-10-25 DIAGNOSIS — H60.391 INFECTIVE OTITIS EXTERNA, RIGHT: Primary | ICD-10-CM

## 2018-10-25 PROCEDURE — 99213 OFFICE O/P EST LOW 20 MIN: CPT | Performed by: NURSE PRACTITIONER

## 2018-10-25 RX ORDER — TOBRAMYCIN 3 MG/ML
1-2 SOLUTION/ DROPS OPHTHALMIC 2 TIMES DAILY
Qty: 2 ML | Refills: 0 | Status: SHIPPED | OUTPATIENT
Start: 2018-10-25 | End: 2019-02-12

## 2018-10-25 ASSESSMENT — PATIENT HEALTH QUESTIONNAIRE - PHQ9
SUM OF ALL RESPONSES TO PHQ QUESTIONS 1-9: 19
SUM OF ALL RESPONSES TO PHQ QUESTIONS 1-9: 17

## 2018-10-25 ASSESSMENT — ENCOUNTER SYMPTOMS
CHILLS: 0
NAUSEA: 0
FEVER: 0
DIARRHEA: 0
VOMITING: 0
DIAPHORESIS: 0
RHINORRHEA: 0
SORE THROAT: 0
SHORTNESS OF BREATH: 0
COUGH: 0

## 2018-10-25 NOTE — PATIENT INSTRUCTIONS
External Ear Infection (Adult)    External otitis (also called  swimmer s ear ) is an infection in the ear canal. It is often caused by bacteria or fungus. It can occur a few days after water gets trapped in the ear canal (from swimming or bathing). It can also occur after cleaning too deeply in the ear canal with a cotton swab or other object. Sometimes, hair care products get into the ear canal and cause this problem.  Symptoms can include pain, fever, itching, redness, drainage, or swelling of the ear canal. Temporary hearing loss may also occur.  Home care    Do not try to clean the ear canal. This can push pus and bacteria deeper into the canal.    Use prescribed ear drops as directed. These help reduce swelling and fight the infection. If an ear wick was placed in the ear canal, apply drops right onto the end of the wick. The wick will draw the medicine into the ear canal even if it is swollen closed.    A cotton ball may be loosely placed in the outer ear to absorb any drainage.    You may use acetaminophen or ibuprofen to control pain, unless another medicine was prescribed. Note: If you have chronic liver or kidney disease or ever had a stomach ulcer or GI bleeding, talk to your healthcare provider before taking any of these medicines.    Do not allow water to get into your ear when bathing. Also, don't swim until the infection has cleared.  Prevention    Keep your ears dry. This helps lower the risk of infection. Dry your ears with a towel or hair dryer after getting wet. Also, use ear plugs when swimming.    Do not stick any objects in the ear to remove wax.    If you feel water trapped in your ear, use ear drops right away. You can get these drops over the counter at most drugstores. They work by removing water from the ear canal.  Follow-up care  Follow up with your healthcare provider in 1 week, or as advised.  When to seek medical advice  Call your healthcare provider right away if any of these  occur:    Ear pain becomes worse or doesn t improve after 3 days of treatment    Redness or swelling of the outer ear occurs or gets worse    Headache    Painful or stiff neck    Drowsiness or confusion    Fever of 100.4 F (38 C) or higher, or as directed by your healthcare provider    Seizure  Date Last Reviewed: 10/1/2017    [id number] [employer]. [address] [place] [address]. All rights reserved. This information is not intended as a substitute for professional medical care. Always follow your healthcare professional's instructions.

## 2018-10-25 NOTE — MR AVS SNAPSHOT
After Visit Summary   10/25/2018    Joel Pineda    MRN: [id number]           Patient Information     Date Of Birth          1975        Visit Information        Provider Department      10/25/2018 11:50 AM Mary Whyte NP [facility]        Today's Diagnoses     Infective otitis externa, right    -  1      Care Instructions      External Ear Infection (Adult)    External otitis (also called  swimmer s ear ) is an infection in the ear canal. It is often caused by bacteria or fungus. It can occur a few days after water gets trapped in the ear canal (from swimming or bathing). It can also occur after cleaning too deeply in the ear canal with a cotton swab or other object. Sometimes, hair care products get into the ear canal and cause this problem.  Symptoms can include pain, fever, itching, redness, drainage, or swelling of the ear canal. Temporary hearing loss may also occur.  Home care    Do not try to clean the ear canal. This can push pus and bacteria deeper into the canal.    Use prescribed ear drops as directed. These help reduce swelling and fight the infection. If an ear wick was placed in the ear canal, apply drops right onto the end of the wick. The wick will draw the medicine into the ear canal even if it is swollen closed.    A cotton ball may be loosely placed in the outer ear to absorb any drainage.    You may use acetaminophen or ibuprofen to control pain, unless another medicine was prescribed. Note: If you have chronic liver or kidney disease or ever had a stomach ulcer or GI bleeding, talk to your healthcare provider before taking any of these medicines.    Do not allow water to get into your ear when bathing. Also, don't swim until the infection has cleared.  Prevention    Keep your ears dry. This helps lower the risk of infection. Dry your ears with a towel or hair dryer after getting wet. Also, use ear plugs when swimming.    Do not stick any objects in the  ear to remove wax.    If you feel water trapped in your ear, use ear drops right away. You can get these drops over the counter at most drugstores. They work by removing water from the ear canal.  Follow-up care  Follow up with your healthcare provider in 1 week, or as advised.  When to seek medical advice  Call your healthcare provider right away if any of these occur:    Ear pain becomes worse or doesn t improve after 3 days of treatment    Redness or swelling of the outer ear occurs or gets worse    Headache    Painful or stiff neck    Drowsiness or confusion    Fever of 100.4 F (38 C) or higher, or as directed by your healthcare provider    Seizure  Date Last Reviewed: 10/1/2017    [id number] [email or web address]. [address] [place] [address], [place] [address]. All rights reserved. This information is not intended as a substitute for professional medical care. Always follow your healthcare professional's instructions.                Follow-ups after your visit        Your next 10 appointments already scheduled     Oct 26, 2018 10:00 AM CDT   TMS TREATMENT with ME UMP PROCEDURE ROOM 2   [facility] ([facility])    [address] [place] [address]   [phone]            Oct 29, 2018 10:00 AM CDT   TMS TREATMENT with ME UMP PROCEDURE ROOM 2   [facility] ([facility])    [address] [place] [address]   [phone]            Oct 30, 2018 10:00 AM CDT   TMS TREATMENT with ME UMP PROCEDURE ROOM 2   [facility] ([facility])    [address] [place] [address]   [phone]            Oct 31, 2018 10:00 AM CDT   TMS TREATMENT with ME UMP PROCEDURE ROOM 2   [facility] ([facility])    [address] [place] [address]   [phone]            Nov 01, 2018 10:00 AM CDT   TMS TREATMENT with ME UMP PROCEDURE ROOM 2   [facility] ([facility])     [address]  [facility] [address]   [phone]            Nov 02, 2018 10:00 AM CDT   TMS TREATMENT with ME UMP PROCEDURE ROOM 2   [facility] ([facility])    [address]  [facility] [address]   [phone]            Nov 05, 2018 10:00 AM CST   TMS TREATMENT with ME UMP PROCEDURE ROOM 2   [facility] ([facility])    [address]  [facility] [address]   [phone]            Nov 06, 2018 10:00 AM CST   TMS TREATMENT with ME UMP PROCEDURE ROOM 2   [facility] ([facility])    [address]  [facility] [address]   [phone]            Nov 06, 2018  2:00 PM CST   Level 1 with [address] ([facility])    [address] [place] [id number] [phone]            Nov 07, 2018 10:00 AM CST   TMS TREATMENT with ME UMP PROCEDURE ROOM 2   [facility] ([facility])    [address]  [facility] [address]   [phone]              Who to contact     If you have questions or need follow up information about today's clinic visit or your schedule please contact [facility] directly at [phone].  Normal or non-critical lab and imaging results will be communicated to you by [employer], letter or phone within 4 business days after the clinic has received the results. If you do not hear from us within 7 days, please contact the clinic through [employer] or phone. If you have a critical or abnormal lab result, we will notify you by phone as soon as possible.  Submit refill requests through [employer] or call your pharmacy and they will forward the refill request to us. Please allow 3 business days for your refill to be completed.          Additional Information About Your Visit        [employer] Information     [employer] gives you secure access to your electronic health  record. If you see a primary care provider, you can also send messages to your care team and make appointments. If you have questions, please call your primary care clinic.  If you do not have a primary care provider, please call [phone] and they will assist you.        Care EveryWhere ID     This is your Care EveryWhere ID. This could be used by other organizations to access your [facility] medical records  [id number]        Your Vitals Were     Pulse Temperature Pulse Oximetry BMI (Body Mass Index)          82 98.6  F (37  C) (Oral) 96% 40.03 kg/m2         Blood Pressure from Last 3 Encounters:   10/25/18 133/81   10/25/18 142/89   10/24/18 140/87    Weight from Last 3 Encounters:   10/25/18 (!) 337 lb 9.6 oz (153.1 kg)   10/19/18 (!) 338 lb (153.3 kg)   10/07/18 (!) 339 lb (153.8 kg)              Today, you had the following     No orders found for display         Today's Medication Changes          These changes are accurate as of 10/25/18 12:58 PM.  If you have any questions, ask your nurse or doctor.               Start taking these medicines.        Dose/Directions    tobramycin 0.3 % ophthalmic solution   Commonly known as:  TOBREX   Used for:  Infective otitis externa, right   Started by:  Mary Whyte, AURELIA        Dose:  1-2 drop   Place 1-2 drops into the right ear 2 times daily for 7 days   Quantity:  2 mL   Refills:  0            Where to get your medicines      These medications were sent to [facility] [address] [place] [facility] [address] [facility]  [address] [place] [address]     Phone:  [phone]     tobramycin 0.3 % ophthalmic solution                Primary Care Provider Office Phone # Fax #    Ksenia Shady Lyles [phone] [address] [facility] [address]        Goals        General    I will continue to attend Psychiatry appointments for medication management, 1/14/2014 (pt-stated)     Notes - Note edited  8/24/2016  4:04 PM by  Dalila Cavazos LSW    As of today's date 8/24/2016 goal is met at 51 - 75%.   Goal Status:  Ongoing Sees  Therapist every other week and Psych PA every 3 weeks.  As of today's date 5/25/2016 goal is met at 51 - 75%.   Goal Status:  Showing progress-  Meeting with Psych PA for second time tomorrow  To review meds As of today's date 4/11/2016 goal is met at 51 - 75%.   Goal Status:  Showing progress  As of today's date 1/14/2014 goal is met at 51 - 75%.   Goal Status:  Active        I will schedule therapy with new counselor, 1/14/2014 (pt-stated)     Notes - Note edited  5/25/2016  1:54 PM by Dalila Cavazos LSW    As of today's date 5/25/2016 goal is met at 76 - 100%.   Goal Status:  Ongoing New therapist and Psych PA on a regular basis  As of today's date 5/10/2016 goal is met at 76 - 100%.   Goal Status:  Ongoing met with new therapist at [facility]  As of today's date 4/11/2016 goal is met at 51 - 75%.   Goal Status:  Ongoing meets with therapist regularly  As of today's date 1/14/2014 goal is met at 0 - 25%.   Goal Status:  Active        Psychosocial I need some help with cleaning (pt-stated)     Notes - Note edited  6/23/2016  1:57 PM by Dalila Cavazos LSW    As of today's date 6/23/2016 goal is met at 26 - 50%.   Goal Status:  Showing progress met with [facility]. Has not yet made a decision  As of today's date 5/25/2016 goal is met at 0 - 25%.   Goal Status:  Ongoing agency had to reschedule appt.  As of today's date 5/10/2016 goal is met at 0 - 25%.   Goal Status:  Active referral to [facility]        Equal Access to Services     ANNA CANO : Madhuri Darby, waaxda luqadaha, qaybta kaalmada lynette, isabell echevarria. [facility] [phone].    ATENCIÓN: Si habla español, tiene a faustin disposición servicios gratuitos de asistencia lingüística. Llame al [phone].    We comply with applicable federal civil rights laws and [place] laws. We do not discriminate on  the basis of race, color, national origin, age, disability, sex, sexual orientation, or gender identity.            Thank you!     Thank you for choosing [facility]  for your care. Our goal is always to provide you with excellent care. Hearing back from our patients is one way we can continue to improve our services. Please take a few minutes to complete the written survey that you may receive in the mail after your visit with us. Thank you!             Your Updated Medication List - Protect others around you: Learn how to safely use, store and throw away your medicines at www.disposemymeds.org.          This list is accurate as of 10/25/18 12:58 PM.  Always use your most recent med list.                   Brand Name Dispense Instructions for use Diagnosis    acetaminophen 500 MG Caps     60 capsule    Take 500 mg by mouth every 4 hours as needed        ADVAIR DISKUS 250-50 MCG/DOSE diskus inhaler   Generic drug:  fluticasone-salmeterol      Inhale 1 puff into the lungs        * albuterol 108 (90 Base) MCG/ACT inhaler    PROAIR HFA/PROVENTIL HFA/VENTOLIN HFA     Inhale 2 puffs into the lungs every 4 hours as needed        * albuterol (2.5 MG/3ML) 0.083% neb solution      Inhale 2.5 mg into the lungs        ALPRAZolam 1 MG 24 hr tablet    XANAX XR     Take 1 mg by mouth every morning        atorvastatin 40 MG tablet    LIPITOR    90 tablet    TAKE 1 TABLET (40 MG) BY MOUTH DAILY    Mixed hyperlipidemia       azithromycin 250 MG tablet    ZITHROMAX    6 tablet    Two tabs today.  One tab daily x 4 days.    Upper respiratory tract infection, unspecified type       budesonide 0.5 MG/2ML neb solution    PULMICORT     Take 0.5 mg by nebulization 2 times daily        celecoxib 200 MG capsule    celeBREX    180 capsule    TAKE 1 CAPSULE BY MOUTH TWICE DAILY AS NEEDED FORMODERATE PAIN    Chronic midline low back pain without sciatica       cetirizine 10 MG tablet    zyrTEC    30 tablet    Take 1 tablet  (10 mg) by mouth At Bedtime    Cough       cyanocobalamin 1000 MCG/ML injection    VITAMIN B12    10 mL    Inject 1 mL (1,000 mcg) into the muscle every 30 days    B12 deficiency       DELSYM PO           EPINEPHrine 0.3 MG/0.3ML injection 2-pack    EPIPEN/ADRENACLICK/or ANY BX GENERIC EQUIV    0.6 mL    Inject 0.3 mLs (0.3 mg) into the muscle once as needed for anaphylaxis    Food allergy       ginger root 550 MG Caps capsule      Take 550 mg by mouth Up to three times daily        hydrOXYzine 50 MG tablet    ATARAX     Take 50 mg by mouth daily        INFLIXIMAB IV           lamoTRIgine 25 MG tablet    LaMICtal     Take 200 mg by mouth daily        levothyroxine 75 MCG tablet    SYNTHROID/LEVOTHROID    90 tablet    TAKE 1 TABLET (75 MCG) BY MOUTH EVERY MORNING.    Acquired hypothyroidism       lidocaine (viscous) 2 % solution    XYLOCAINE    100 mL    Take 15 mLs by mouth every 2 hours as needed for moderate pain max 8 doses/24 hour period    Throat pain       loperamide 2 MG capsule    IMODIUM     Take 2 mg by mouth 4 times daily as needed for diarrhea        metoprolol succinate 200 MG 24 hr tablet    TOPROL-XL    180 tablet    Take 2 tablets (400 mg) by mouth daily    Essential hypertension with goal blood pressure less than 140/90       montelukast 10 MG tablet    SINGULAIR     Take 10 mg by mouth        omega-3 acid ethyl esters 1 g capsule    Lovaza    360 capsule    TAKE 2 CAPSULES BY MOUTH TWICE DAILY.    Hypertriglyceridemia       omeprazole 20 MG tablet      Take 20 mg by mouth daily        order for DME     12 each    Equipment being ordered: 1 ml tuberculin syringes to be used for Vitamin B12 injections.    Vitamin B12 deficiency (non anaemic)       order for DME     1 Units    SI-loc belt    SI (sacroiliac) joint dysfunction       order for DME      Respironics REMSTAR 60 Series Auto CPAP 10-12 cm H2O, Wisp nasal mask w/a large cushion and a chinstrap        oxyCODONE IR 5 MG tablet    ROXICODONE     35 tablet    Take 1-2 tablets (5-10 mg) by mouth every 6 hours as needed for pain (maximum 4 tablet(s) per day)    Facet arthropathy       predniSONE 20 MG tablet    DELTASONE    15 tablet    Take 3 tablets (60 mg) by mouth daily    Upper respiratory tract infection, unspecified type       pregabalin 50 MG capsule    LYRICA    60 capsule    Take 1 capsule (50 mg) by mouth 2 times daily    Chronic midline low back pain without sciatica       pseudoePHEDrine 120 MG 12 hr tablet    SUDAFED    28 tablet    Take 1 tablet (120 mg) by mouth every 12 hours    Nasal congestion       ramipril 10 MG capsule    ALTACE    90 capsule    Take 1 capsule (10 mg) by mouth daily    Hypertension goal BP (blood pressure) < 140/90       risperiDONE 1 MG tablet    risperDAL     Take 0.5 mg by mouth daily        syringe (disposable) 1 ML [employer]    BD TUBERCULIN SYRINGE    12 each    Equipment being ordered: 1 ml tuberculin syringes to be used for Vitamin B12 injections.    B12 deficiency       tiZANidine 4 MG tablet    ZANAFLEX    90 tablet    TAKE 1 TABLET BY MOUTH THREE TIMES DAILY AS NEEDED    Dorsalgia       tobramycin 0.3 % ophthalmic solution    TOBREX    2 mL    Place 1-2 drops into the right ear 2 times daily for 7 days    Infective otitis externa, right       vitamin D3 36206 UNITS capsule    CHOLECALCIFEROL    24 capsule    Take one capsule every two weeks.    Vitamin D deficiency       * Notice:  This list has 2 medication(s) that are the same as other medications prescribed for you. Read the directions carefully, and ask your doctor or other care provider to review them with you.

## 2018-10-25 NOTE — PROGRESS NOTES
[facility]  [address], [address]  [place] [address]  TMS Procedure Note   Joel Pineda MRN# [id number]  Age: 43 year old year old YOB: 1975    Pre-Procedure:  History and Physical: Reviewed in medical record  Consent Signed by: Joel Pineda  On: 10/19/18    Clinical Narrative:  Patient tolerating treatments, did report headache for most of the day after treatment. Still has ringing in both ears, mostly his right.     Indications for TMS:  MDD, recurrent, severe; 4+ medication trials (from 2+ classes) ineffective; Psychotherapy ineffective.     Pre-Procedure Diagnosis:  MDD, recurrent, severe F33.2    Treatment Hx:  Treatment number this series: 5  Total lifetime treatment number: 5    Allergies   Allergen Reactions     Banana Shortness Of Breath     Pt reports organic Banana is okay.      Nitroglycerin Palpitations     Penicillins Anaphylaxis     Provigil [Modafinil] Shortness Of Breath     headache     Ciprofloxacin Palpitations, Other (See Comments) and Rash     dizziness (versus metronidazole taken at same time)     Gadolinium Hives and Itching     Patient was premedicated for the contrast allergy. He did still have a reaction a few hours after injection. Hives and itching. Dr. Gomez told tech to inform pt he should only have contrast again in the future when premedicated and at a hospital. Not at an outpatient facility.      Dulera Other (See Comments)     Hoarse voice     Dye [Contrast Dye] Other (See Comments) and Hives     Moderate flushing, CT contrast     Levaquin Other (See Comments)     tendonitis     Neurontin [Gabapentin] Hives     Moderate hives     Nortriptyline Hives     Varicella Virus Vaccine Live      Rash     Ciprofloxacin Palpitations     Flagyl [Metronidazole Hcl] Palpitations and Hives     Latex Rash     Metronidazole Palpitations, Other (See Comments) and Rash     dizziness (versus ciprofloxacin taken at same time)     No Clinical Screening -  See Comments Rash     Nitrile gloves      /89 (BP Location: Right arm, Patient Position: Sitting, Cuff Size: Adult Large)  Pulse 78    Pause for the Cause  Right patient:  Yes  Right procedure/correct coil:  Yes; rTMS; cpt 50119; F8 coil.   Earplugs in place:  Yes    Procedure  Patient was seated in procedure chair.  Identity and procedure was verified.  Ear plugs were placed in ears and patient-specific cap was placed on head and tightened appropriately.  Ruler locations were verified.  Bone conducting headphones were not used. Coil was placed at F4 and stimulator was set to 72% (110% MT) for 50 trains. Pt tolerated procedure well with forehead movement but no other motor movement.    Motor Threshold Determination  Distance from nasion to inion:   Tragus to tragus distance:   Head circumference:   MT 1: C3 @ 65% on 10/19/18    Stimulation Parameters  Frequency: 1 Hz     Train duration: 60 sec  Total pulses delivered: 1980  Inter-train interval: 1 sec  Tx Loc: F4  Energy: 72% (110% MT)  Trains: 50 trains        Post-Procedure Diagnosis:  MDD, recurrent, severe F33.2    Micheline Whiteside  [facility]  Mental [facility] Neuromodulation    Plan   - Cont TMS  -ReMT Friday    I didn t see the patient during/after treatment but remained available in the clinic during  treatment.    Lynne Mohr MD  [facility] Neuromodulation

## 2018-10-25 NOTE — MR AVS SNAPSHOT
After Visit Summary   10/25/2018    Joel Pineda    MRN: [id number]           Patient Information     Date Of Birth          1975        Visit Information        Provider Department      10/25/2018 10:00 AM [facility] PROCEDURE ROOM 2 P Psychiatry        Today's Diagnoses     Severe episode of recurrent major depressive disorder, without psychotic features (H)    -  1       Follow-ups after your visit        Your next 10 appointments already scheduled     Oct 29, 2018 10:00 AM CDT   TMS TREATMENT with [facility] PROCEDURE ROOM 2   P Psychiatry ([facility])    [address]  [facility] [address]   [phone]            Oct 30, 2018 10:00 AM CDT   TMS TREATMENT with [facility] PROCEDURE ROOM 2   [facility] Psychiatry ([facility])    [address]  [facility] [address]   [phone]            Oct 31, 2018 10:00 AM CDT   TMS TREATMENT with [facility] PROCEDURE ROOM 2   [facility] Psychiatry ([facility])    [address]  [facility] [address]   [phone]            Nov 01, 2018 10:00 AM CDT   TMS TREATMENT with [facility] PROCEDURE ROOM 2   P Psychiatry ([facility])    [address]  [facility] [address]   [phone]            Nov 02, 2018 10:00 AM CDT   TMS TREATMENT with [facility] PROCEDURE ROOM 2   [facility] Psychiatry ([facility])    [address]  [facility] [address]   [phone]            Nov 05, 2018 10:00 AM CST   TMS TREATMENT with [facility] PROCEDURE ROOM 2   P Psychiatry ([facility])    [address]  [facility] [address]   [phone]            Nov 06, 2018 10:00 AM CST   TMS TREATMENT with [facility] PROCEDURE ROOM 2   P Psychiatry ([facility])    [address] [place] [address]   [phone]            Nov 06, 2018  2:00 PM CST   Level 1 with BAY 5 INFUSION   M  [facility] ([facility])    [address] [address] [place] [id number] [phone]            Nov 07, 2018 10:00 AM CST   TMS TREATMENT with ME [facility] PROCEDURE ROOM 2   [facility] Psychiatry ([facility])    [address]  [facility] [id number] [phone]            Nov 08, 2018 10:00 AM CST   TMS TREATMENT with ME [facility] PROCEDURE ROOM 2   [facility] Psychiatry ([facility])    [address]  [facility] [id number] [phone]              Who to contact     Please call your clinic at [phone] to:    Ask questions about your health    Make or cancel appointments    Discuss your medicines    Learn about your test results    Speak to your doctor            Additional Information About Your Visit        Before the [employer] Information     [employer] gives you secure access to your electronic health record. If you see a primary care provider, you can also send messages to your care team and make appointments. If you have questions, please call your primary care clinic.  If you do not have a primary care provider, please call [phone] and they will assist you.      [employer] is an electronic gateway that provides easy, online access to your medical records. With [employer], you can request a clinic appointment, read your test results, renew a prescription or communicate with your care team.     To access your existing account, please contact your [facility] or call [phone] for assistance.        Care EveryWhere ID     This is your Care EveryWhere ID. This could be used by other organizations to access your [place] medical records  [id number]        Your Vitals Were     Pulse                   78            Blood Pressure from Last 3 Encounters:   10/26/18 130/83   10/25/18 133/81   10/25/18 142/89    Weight from Last 3 Encounters:   10/25/18 (!) 153.1 kg (337 lb 9.6 oz)   10/19/18 (!) 153.3 kg (338 lb)    10/07/18 (!) 153.8 kg (339 lb)              We Performed the Following     C TRANSCRANIAL MAGNETIC STIMULATION TREATMENT,DELIVERY/MANAGEMENT          Today's Medication Changes          These changes are accurate as of 10/25/18 11:59 PM.  If you have any questions, ask your nurse or doctor.               Start taking these medicines.        Dose/Directions    tobramycin 0.3 % ophthalmic solution   Commonly known as:  TOBREX   Used for:  Infective otitis externa, right   Started by:  Mary Whyte NP        Dose:  1-2 drop   Place 1-2 drops into the right ear 2 times daily for 7 days   Quantity:  2 mL   Refills:  0            Where to get your medicines      These medications were sent to [place] [id number] [facility] - [facility] [address] [facility]  [address] [facility] [address]     Phone:  [phone]     tobramycin 0.3 % ophthalmic solution                Primary Care Provider Office Phone # Fax #    Ksenia Shady Lyles [phone] [address] [facility] [address]        Goals        General    I will continue to attend Psychiatry appointments for medication management, 1/14/2014 (pt-stated)     Notes - Note edited  8/24/2016  4:04 PM by Dalila Cavazos LSW    As of today's date 8/24/2016 goal is met at 51 - 75%.   Goal Status:  Ongoing Sees  Therapist every other week and Psych PA every 3 weeks.  As of today's date 5/25/2016 goal is met at 51 - 75%.   Goal Status:  Showing progress-  Meeting with Psych PA for second time tomorrow  To review meds As of today's date 4/11/2016 goal is met at 51 - 75%.   Goal Status:  Showing progress  As of today's date 1/14/2014 goal is met at 51 - 75%.   Goal Status:  Active        I will schedule therapy with new counselor, 1/14/2014 (pt-stated)     Notes - Note edited  5/25/2016  1:54 PM by Dalila Cavazos LSW    As of today's date 5/25/2016 goal is met at 76 - 100%.   Goal Status:  Ongoing New therapist and Psych PA  on a regular basis  As of today's date 5/10/2016 goal is met at 76 - 100%.   Goal Status:  Ongoing met with new therapist at [facility]  As of today's date 4/11/2016 goal is met at 51 - 75%.   Goal Status:  Ongoing meets with therapist regularly  As of today's date 1/14/2014 goal is met at 0 - 25%.   Goal Status:  Active        Psychosocial I need some help with cleaning (pt-stated)     Notes - Note edited  6/23/2016  1:57 PM by Dalila Cavazos LSW    As of today's date 6/23/2016 goal is met at 26 - 50%.   Goal Status:  Showing progress met with [facility]. Has not yet made a decision  As of today's date 5/25/2016 goal is met at 0 - 25%.   Goal Status:  Ongoing agency had to reschedule appt.  As of today's date 5/10/2016 goal is met at 0 - 25%.   Goal Status:  Active referral to [facility]        Equal Access to Services     ANNA CANO AH: Hadii aad ku hadasho Soomaali, waaxda luqadaha, qaybta kaalmada adeegyada, isabell shankar . So [facility] [phone].    ATENCIÓN: Si habla español, tiene a faustin disposición servicios gratuitos de asistencia lingüística. Llame al [phone].    We comply with applicable federal civil rights laws and [place] laws. We do not discriminate on the basis of race, color, national origin, age, disability, sex, sexual orientation, or gender identity.            Thank you!     Thank you for choosing [facility] PSYCH[facility]RY  for your care. Our goal is always to provide you with excellent care. Hearing back from our patients is one way we can continue to improve our services. Please take a few minutes to complete the written survey that you may receive in the mail after your visit with us. Thank you!             Your Updated Medication List - Protect others around you: Learn how to safely use, store and throw away your medicines at www.disposemymeds.org.          This list is accurate as of 10/25/18 11:59 PM.  Always use your most recent med list.                   Brand Name Dispense  Instructions for use Diagnosis    acetaminophen 500 MG Caps     60 capsule    Take 500 mg by mouth every 4 hours as needed        ADVAIR DISKUS 250-50 MCG/DOSE diskus inhaler   Generic drug:  fluticasone-salmeterol      Inhale 1 puff into the lungs        * albuterol 108 (90 Base) MCG/ACT inhaler    PROAIR HFA/PROVENTIL HFA/VENTOLIN HFA     Inhale 2 puffs into the lungs every 4 hours as needed        * albuterol (2.5 MG/3ML) 0.083% neb solution      Inhale 2.5 mg into the lungs        ALPRAZolam 1 MG 24 hr tablet    XANAX XR     Take 1 mg by mouth every morning        atorvastatin 40 MG tablet    LIPITOR    90 tablet    TAKE 1 TABLET (40 MG) BY MOUTH DAILY    Mixed hyperlipidemia       azithromycin 250 MG tablet    ZITHROMAX    6 tablet    Two tabs today.  One tab daily x 4 days.    Upper respiratory tract infection, unspecified type       budesonide 0.5 MG/2ML neb solution    PULMICORT     Take 0.5 mg by nebulization 2 times daily        celecoxib 200 MG capsule    celeBREX    180 capsule    TAKE 1 CAPSULE BY MOUTH TWICE DAILY AS NEEDED FORMODERATE PAIN    Chronic midline low back pain without sciatica       cetirizine 10 MG tablet    zyrTEC    30 tablet    Take 1 tablet (10 mg) by mouth At Bedtime    Cough       cyanocobalamin 1000 MCG/ML injection    VITAMIN B12    10 mL    Inject 1 mL (1,000 mcg) into the muscle every 30 days    B12 deficiency       DELSYM PO           EPINEPHrine 0.3 MG/0.3ML injection 2-pack    EPIPEN/ADRENACLICK/or ANY BX GENERIC EQUIV    0.6 mL    Inject 0.3 mLs (0.3 mg) into the muscle once as needed for anaphylaxis    Food allergy       ginger root 550 MG Caps capsule      Take 550 mg by mouth Up to three times daily        hydrOXYzine 50 MG tablet    ATARAX     Take 50 mg by mouth daily        INFLIXIMAB IV           lamoTRIgine 25 MG tablet    LaMICtal     Take 200 mg by mouth daily        levothyroxine 75 MCG tablet    SYNTHROID/LEVOTHROID    90 tablet    TAKE 1 TABLET (75 MCG) BY MOUTH  EVERY MORNING.    Acquired hypothyroidism       lidocaine (viscous) 2 % solution    XYLOCAINE    100 mL    Take 15 mLs by mouth every 2 hours as needed for moderate pain max 8 doses/24 hour period    Throat pain       loperamide 2 MG capsule    IMODIUM     Take 2 mg by mouth 4 times daily as needed for diarrhea        metoprolol succinate 200 MG 24 hr tablet    TOPROL-XL    180 tablet    Take 2 tablets (400 mg) by mouth daily    Essential hypertension with goal blood pressure less than 140/90       montelukast 10 MG tablet    SINGULAIR     Take 10 mg by mouth        omega-3 acid ethyl esters 1 g capsule    Lovaza    360 capsule    TAKE 2 CAPSULES BY MOUTH TWICE DAILY.    Hypertriglyceridemia       omeprazole 20 MG tablet      Take 20 mg by mouth daily        order for DME     12 each    Equipment being ordered: 1 ml tuberculin syringes to be used for Vitamin B12 injections.    Vitamin B12 deficiency (non anaemic)       order for DME     1 Units    SI-loc belt    SI (sacroiliac) joint dysfunction       order for DME      Respironics REMSTAR 60 Series Auto CPAP 10-12 cm H2O, Wisp nasal mask w/a large cushion and a chinstrap        oxyCODONE IR 5 MG tablet    ROXICODONE    35 tablet    Take 1-2 tablets (5-10 mg) by mouth every 6 hours as needed for pain (maximum 4 tablet(s) per day)    Facet arthropathy       predniSONE 20 MG tablet    DELTASONE    15 tablet    Take 3 tablets (60 mg) by mouth daily    Upper respiratory tract infection, unspecified type       pregabalin 50 MG capsule    LYRICA    60 capsule    Take 1 capsule (50 mg) by mouth 2 times daily    Chronic midline low back pain without sciatica       pseudoePHEDrine 120 MG 12 hr tablet    SUDAFED    28 tablet    Take 1 tablet (120 mg) by mouth every 12 hours    Nasal congestion       ramipril 10 MG capsule    ALTACE    90 capsule    Take 1 capsule (10 mg) by mouth daily    Hypertension goal BP (blood pressure) < 140/90       risperiDONE 1 MG tablet     risperDAL     Take 0.5 mg by mouth daily        syringe (disposable) 1 ML Misc    BD TUBERCULIN SYRINGE    12 each    Equipment being ordered: 1 ml tuberculin syringes to be used for Vitamin B12 injections.    B12 deficiency       tiZANidine 4 MG tablet    ZANAFLEX    90 tablet    TAKE 1 TABLET BY MOUTH THREE TIMES DAILY AS NEEDED    Dorsalgia       tobramycin 0.3 % ophthalmic solution    TOBREX    2 mL    Place 1-2 drops into the right ear 2 times daily for 7 days    Infective otitis externa, right       vitamin D3 19970 UNITS capsule    CHOLECALCIFEROL    24 capsule    Take one capsule every two weeks.    Vitamin D deficiency       * Notice:  This list has 2 medication(s) that are the same as other medications prescribed for you. Read the directions carefully, and ask your doctor or other care provider to review them with you.

## 2018-10-25 NOTE — PROGRESS NOTES
"SUBJECTIVE:   Joel Pineda is a 43 year old male presenting with a chief complaint of   Chief Complaint   Patient presents with     Ear Problem     Patient complains of ringing in ears        He is an established patient of [place].    Ringing in ears    Onset of symptoms was 5 day(s) ago.  Course of illness is worsening.    Severity moderate  Current and Associated symptoms: ear pain right and ringing  Treatment measures tried include None tried.  Predisposing factors include None.      Review of Systems   Constitutional: Negative for chills, diaphoresis and fever.   HENT: Positive for ear pain and tinnitus. Negative for congestion, rhinorrhea and sore throat.    Respiratory: Negative for cough and shortness of breath.    Gastrointestinal: Negative for diarrhea, nausea and vomiting.   All other systems reviewed and are negative.      Past Medical History:   Diagnosis Date     Acne      Chest pain     Chest pain, regulated w/BP meds. Clear arteries.     Skin exam, screening for cancer 12/3/2013     Family History   Problem Relation Age of Onset     Musculoskeletal Disorder Mother      back     Anxiety Disorder Mother      Colon Polyps Mother      Ulcerative Colitis Mother      and ischemic small intestine, surgery     Hypertension Mother      Breast Cancer Mother      Osteoporosis Mother      Diabetes Mother      Type 2, Diagnosed in 2014     Depression Mother      Takes Cymbalta to help with chronic pain + depx     Thyroid Disease Mother      Hypothyroidism     Obesity Mother      Under much better control latter half of 2015     Musculoskeletal Disorder Father      back     Substance Abuse Father      Hypertension Father      Hyperlipidemia Father      Depression Father      Off meds for many years. Seems \"ok\"     HEART DISEASE Maternal Grandmother      HEART DISEASE Maternal Grandfather      Psychotic Disorder Paternal Grandfather      Suicide Paternal Grandfather      Depression Paternal Grandfather      " Pediatrician. Committed suicide by pistol in 1990.     Musculoskeletal Disorder Brother      back     Depression Brother      Expressed as anger and moodiness     Substance Abuse Sister      Depression Sister      Mental Health Therapist, yet no anti-depressants?     Anxiety Disorder Sister      Mental Health Therapist, yet no anti-anxiety meds?     Other Cancer Other      Bladder Cancer - Fatal     Substance Abuse Brother      Colon Cancer No family hx of      Crohn Disease No family hx of      Anesthesia Reaction No family hx of      Cancer No family hx of      No family history of skin cancer     Current Outpatient Prescriptions   Medication Sig Dispense Refill     acetaminophen 500 MG CAPS Take 500 mg by mouth every 4 hours as needed 60 capsule      albuterol (2.5 MG/3ML) 0.083% neb solution Inhale 2.5 mg into the lungs       albuterol (PROAIR HFA/PROVENTIL HFA/VENTOLIN HFA) 108 (90 BASE) MCG/ACT Inhaler Inhale 2 puffs into the lungs every 4 hours as needed       ALPRAZolam (XANAX XR) 1 MG 24 hr tablet Take 1 mg by mouth every morning       atorvastatin (LIPITOR) 40 MG tablet TAKE 1 TABLET (40 MG) BY MOUTH DAILY 90 tablet 1     azithromycin (ZITHROMAX) 250 MG tablet Two tabs today.  One tab daily x 4 days. 6 tablet 0     budesonide (PULMICORT) 0.5 MG/2ML neb solution Take 0.5 mg by nebulization 2 times daily       celecoxib (CELEBREX) 200 MG capsule TAKE 1 CAPSULE BY MOUTH TWICE DAILY AS NEEDED FORMODERATE PAIN 180 capsule 1     cetirizine (ZYRTEC) 10 MG tablet Take 1 tablet (10 mg) by mouth At Bedtime 30 tablet 11     cyanocobalamin (VITAMIN B12) 1000 MCG/ML injection Inject 1 mL (1,000 mcg) into the muscle every 30 days 10 mL 0     EPINEPHrine (EPIPEN/ADRENACLICK/OR ANY BX GENERIC EQUIV) 0.3 MG/0.3ML injection 2-pack Inject 0.3 mLs (0.3 mg) into the muscle once as needed for anaphylaxis 0.6 mL 3     fluticasone-salmeterol (ADVAIR DISKUS) 250-50 MCG/DOSE diskus inhaler Inhale 1 puff into the lungs       Ginger,  Zingiber officinalis, (GINGER ROOT) 550 MG CAPS capsule Take 550 mg by mouth Up to three times daily       hydrOXYzine (ATARAX) 50 MG tablet Take 50 mg by mouth daily       INFLIXIMAB IV        lamoTRIgine (LAMICTAL) 25 MG tablet Take 200 mg by mouth daily        levothyroxine (SYNTHROID/LEVOTHROID) 75 MCG tablet TAKE 1 TABLET (75 MCG) BY MOUTH EVERY MORNING. 90 tablet 0     loperamide (IMODIUM) 2 MG capsule Take 2 mg by mouth 4 times daily as needed for diarrhea       metoprolol succinate (TOPROL-XL) 200 MG 24 hr tablet Take 2 tablets (400 mg) by mouth daily 180 tablet 0     montelukast (SINGULAIR) 10 MG tablet Take 10 mg by mouth       omega-3 acid ethyl esters (LOVAZA) 1 g capsule TAKE 2 CAPSULES BY MOUTH TWICE DAILY. 360 capsule 1     omeprazole 20 MG tablet Take 20 mg by mouth daily        order for DME Respironics REMSTAR 60 Series Auto CPAP 10-12 cm H2O, Wisp nasal mask w/a large cushion and a chinstrap       order for DME SI-loc belt 1 Units 0     order for DME Equipment being ordered: 1 ml tuberculin syringes to be used for Vitamin B12 injections. 12 each 1     oxyCODONE IR (ROXICODONE) 5 MG tablet Take 1-2 tablets (5-10 mg) by mouth every 6 hours as needed for pain (maximum 4 tablet(s) per day) 35 tablet 0     pregabalin (LYRICA) 50 MG capsule Take 1 capsule (50 mg) by mouth 2 times daily 60 capsule 2     pseudoePHEDrine (SUDAFED) 120 MG 12 hr tablet Take 1 tablet (120 mg) by mouth every 12 hours 28 tablet 0     ramipril (ALTACE) 10 MG capsule Take 1 capsule (10 mg) by mouth daily 90 capsule 1     risperiDONE (RISPERDAL) 1 MG tablet Take 0.5 mg by mouth daily        syringe, disposable, (BD TUBERCULIN SYRINGE) 1 ML MISC Equipment being ordered: 1 ml tuberculin syringes to be used for Vitamin B12 injections. 12 each 1     tiZANidine (ZANAFLEX) 4 MG tablet TAKE 1 TABLET BY MOUTH THREE TIMES DAILY AS NEEDED 90 tablet 4     tobramycin (TOBREX) 0.3 % ophthalmic solution Place 1-2 drops into the right ear 2  times daily for 7 days 2 mL 0     vitamin D3 (CHOLECALCIFEROL) 93095 UNITS capsule Take one capsule every two weeks. 24 capsule 1     Dextromethorphan Polistirex (DELSYM PO)        lidocaine, viscous, (XYLOCAINE) 2 % solution Take 15 mLs by mouth every 2 hours as needed for moderate pain max 8 doses/24 hour period (Patient not taking: Reported on 10/19/2018) 100 mL 0     predniSONE (DELTASONE) 20 MG tablet Take 3 tablets (60 mg) by mouth daily (Patient not taking: Reported on 10/19/2018) 15 tablet 0     Social History   Substance Use Topics     Smoking status: Never Smoker     Smokeless tobacco: Never Used     Alcohol use No       OBJECTIVE  /81 (BP Location: Left arm, Patient Position: Chair, Cuff Size: Adult Regular)  Pulse 82  Temp 98.6  F (37  C) (Oral)  Wt (!) 337 lb 9.6 oz (153.1 kg)  SpO2 96%  BMI 40.03 kg/m2    Physical Exam   HENT:   Right Ear: Tympanic membrane normal. There is swelling and tenderness.   Left Ear: Hearing, tympanic membrane, external ear and ear canal normal.   Right ear canal is erythematous and swollen   Cardiovascular: Normal rate and normal heart sounds.    Pulmonary/Chest: Effort normal and breath sounds normal.   Neurological: He is alert.   Psychiatric: He has a normal mood and affect. His behavior is normal. Judgment and thought content normal.       ASSESSMENT:      ICD-10-CM    1. Infective otitis externa, right H60.391 tobramycin (TOBREX) 0.3 % ophthalmic solution      Serious Comorbid Conditions:  Adult:  None    PLAN:  Patient educational/instructional material provided including reasons for follow-up    The patient indicates understanding of these issues and agrees with the plan.      Patient Instructions     External Ear Infection (Adult)    External otitis (also called  swimmer s ear ) is an infection in the ear canal. It is often caused by bacteria or fungus. It can occur a few days after water gets trapped in the ear canal (from swimming or bathing). It can  also occur after cleaning too deeply in the ear canal with a cotton swab or other object. Sometimes, hair care products get into the ear canal and cause this problem.  Symptoms can include pain, fever, itching, redness, drainage, or swelling of the ear canal. Temporary hearing loss may also occur.  Home care    Do not try to clean the ear canal. This can push pus and bacteria deeper into the canal.    Use prescribed ear drops as directed. These help reduce swelling and fight the infection. If an ear wick was placed in the ear canal, apply drops right onto the end of the wick. The wick will draw the medicine into the ear canal even if it is swollen closed.    A cotton ball may be loosely placed in the outer ear to absorb any drainage.    You may use acetaminophen or ibuprofen to control pain, unless another medicine was prescribed. Note: If you have chronic liver or kidney disease or ever had a stomach ulcer or GI bleeding, talk to your healthcare provider before taking any of these medicines.    Do not allow water to get into your ear when bathing. Also, don't swim until the infection has cleared.  Prevention    Keep your ears dry. This helps lower the risk of infection. Dry your ears with a towel or hair dryer after getting wet. Also, use ear plugs when swimming.    Do not stick any objects in the ear to remove wax.    If you feel water trapped in your ear, use ear drops right away. You can get these drops over the counter at most drugstores. They work by removing water from the ear canal.  Follow-up care  Follow up with your healthcare provider in 1 week, or as advised.  When to seek medical advice  Call your healthcare provider right away if any of these occur:    Ear pain becomes worse or doesn t improve after 3 days of treatment    Redness or swelling of the outer ear occurs or gets worse    Headache    Painful or stiff neck    Drowsiness or confusion    Fever of 100.4 F (38 C) or higher, or as directed by your  healthcare provider    Seizure  Date Last Reviewed: 10/1/2017    [id number] [employer]. [address] [place] [address], [place] [address]. All rights reserved. This information is not intended as a substitute for professional medical care. Always follow your healthcare professional's instructions.

## 2018-10-26 ENCOUNTER — OFFICE VISIT (OUTPATIENT)
Dept: PSYCHIATRY | Facility: CLINIC | Age: 43
End: 2018-10-26
Payer: COMMERCIAL

## 2018-10-26 VITALS — HEART RATE: 65 BPM | SYSTOLIC BLOOD PRESSURE: 130 MMHG | DIASTOLIC BLOOD PRESSURE: 83 MMHG

## 2018-10-26 DIAGNOSIS — F33.2 SEVERE EPISODE OF RECURRENT MAJOR DEPRESSIVE DISORDER, WITHOUT PSYCHOTIC FEATURES (H): Primary | ICD-10-CM

## 2018-10-26 ASSESSMENT — PATIENT HEALTH QUESTIONNAIRE - PHQ9: SUM OF ALL RESPONSES TO PHQ QUESTIONS 1-9: 12

## 2018-10-26 NOTE — PROGRESS NOTES
[facility]  [address], [address]  [place] [address]  TMS Procedure Note   Joel Pineda MRN# [id number]  Age: 43 year old year old YOB: 1975    Pre-Procedure:  History and Physical: Reviewed in medical record  Consent Signed by: Joel Pineda  On: 10/19/18    Clinical Narrative:  Patient tolerating treatments.  Patient was diagnosed with an ear infection and given drops to use.  Patient is not experiencing tinnitus during treatment today.  Patient reported poor sleep last night.    Indications for TMS:  MDD, recurrent, severe; 4+ medication trials (from 2+ classes) ineffective; Psychotherapy ineffective. Completed IDS-SR. (46)    Pre-Procedure Diagnosis:  MDD, recurrent, severe F33.2    Treatment Hx:  Treatment number this series: 6  Total lifetime treatment number: 6    Allergies   Allergen Reactions     Banana Shortness Of Breath     Pt reports organic Banana is okay.      Nitroglycerin Palpitations     Penicillins Anaphylaxis     Provigil [Modafinil] Shortness Of Breath     headache     Ciprofloxacin Palpitations, Other (See Comments) and Rash     dizziness (versus metronidazole taken at same time)     Gadolinium Hives and Itching     Patient was premedicated for the contrast allergy. He did still have a reaction a few hours after injection. Hives and itching. Dr. Gomez told tech to inform pt he should only have contrast again in the future when premedicated and at a hospital. Not at an outpatient facility.      Dulera Other (See Comments)     Hoarse voice     Dye [Contrast Dye] Other (See Comments) and Hives     Moderate flushing, CT contrast     Levaquin Other (See Comments)     tendonitis     Neurontin [Gabapentin] Hives     Moderate hives     Nortriptyline Hives     Varicella Virus Vaccine Live      Rash     Ciprofloxacin Palpitations     Flagyl [Metronidazole Hcl] Palpitations and Hives     Latex Rash     Metronidazole Palpitations, Other (See Comments) and  Rash     dizziness (versus ciprofloxacin taken at same time)     No Clinical Screening - See Comments Rash     Nitrile gloves      /83 (BP Location: Right arm, Patient Position: Sitting, Cuff Size: Adult Large)  Pulse 65    Pause for the Cause  Right patient:  Yes  Right procedure/correct coil:  Yes; rTMS; cpt 43778; H1 coil.   Earplugs in place:  Yes    Procedure  Patient was seated in procedure chair. Identity and procedure was verified. Ear plugs were placed in ears and patient-specific cap was placed on head and tightened appropriately.  Coil was placed at initial MT location and stimulator was set to initial MT. MT was retested and found as described below. Coil was placed at treatment location and stimulator was set to stimulation parameters detailed below. A test train was delivered and pt tolerated train fine. Given pt tolerance, 75 trains were delivered. Pt tolerated procedure with moderate forehead movement.    Motor Threshold Determination  Distance from nasion to inion:   Tragus to tragus distance:   Head circumference:   MT 1: C3 @ 65% on 10/19/18  MT 2: C3 @ 62% on 10/26/18    Stimulation Parameters  Frequency: 1 Hz     Train duration: 60 sec  Total pulses delivered: 1980  Inter-train interval: 1 sec  Tx Loc: F4  Energy: 68% (110% MT)  Trains: 50 trains        Post-Procedure Diagnosis:  MDD, recurrent, severe F33.2    Heather Zamorano  [facility]    Plan   - Cont TMS    I did see the patient and performed redetermination of motor threshold and remained available in clinic throughout the treatment.    Cleo Brito MD  [facility]

## 2018-10-26 NOTE — MR AVS SNAPSHOT
After Visit Summary   10/26/2018    Joel Pineda    MRN: [id number]           Patient Information     Date Of Birth          1975        Visit Information        Provider Department      10/26/2018 10:00 AM [facility] PROCEDURE ROOM 2 P Psychiatry        Today's Diagnoses     Severe episode of recurrent major depressive disorder, without psychotic features (H)    -  1       Follow-ups after your visit        Your next 10 appointments already scheduled     Oct 29, 2018 10:00 AM CDT   TMS TREATMENT with [facility] PROCEDURE ROOM 2   P Psychiatry ([facility])    [address]  [place] [address]   [phone]            Oct 30, 2018 10:00 AM CDT   TMS TREATMENT with [facility] PROCEDURE ROOM 2   [facility] Psychiatry ([facility])    [address]  [place] [address]   [phone]            Oct 31, 2018 10:00 AM CDT   TMS TREATMENT with [facility] PROCEDURE ROOM 2   [facility] Psychiatry ([facility])    [address]  [place] [address]   [phone]            Nov 01, 2018 10:00 AM CDT   TMS TREATMENT with [facility] PROCEDURE ROOM 2   P Psychiatry ([facility])    [address]  [place] [address]   [phone]            Nov 02, 2018 10:00 AM CDT   TMS TREATMENT with [facility] PROCEDURE ROOM 2   [facility] Psychiatry ([facility])    [address]  [place] [address]   [phone]            Nov 05, 2018 10:00 AM CST   TMS TREATMENT with [facility] PROCEDURE ROOM 2   P Psychiatry ([facility])    [address]  [place] [address]   [phone]            Nov 06, 2018 10:00 AM CST   TMS TREATMENT with [facility] PROCEDURE ROOM 2   P Psychiatry ([facility])    [address] [place] [address]   [phone]            Nov 06, 2018  2:00 PM CST   Level 1 with BAY 5 INFUSION   M  [facility] ([facility])    [address] [address] [place] [id number] [phone]            Nov 07, 2018 10:00 AM CST   TMS TREATMENT with ME [facility] PROCEDURE ROOM 2   [facility] Psychiatry ([facility])    [address]  [facility] [id number] [phone]            Nov 08, 2018 10:00 AM CST   TMS TREATMENT with ME [facility] PROCEDURE ROOM 2   [facility] Psychiatry ([facility])    [address]  [facility] [id number] [phone]              Who to contact     Please call your clinic at [phone] to:    Ask questions about your health    Make or cancel appointments    Discuss your medicines    Learn about your test results    Speak to your doctor            Additional Information About Your Visit        [employer] Information     Snapchat gives you secure access to your electronic health record. If you see a primary care provider, you can also send messages to your care team and make appointments. If you have questions, please call your primary care clinic.  If you do not have a primary care provider, please call [phone] and they will assist you.      Snapchat is an electronic gateway that provides easy, online access to your medical records. With Snap[employer], you can request a clinic appointment, read your test results, renew a prescription or communicate with your care team.     To access your existing account, please contact your [facility] or call [phone] for assistance.        Care EveryWhere ID     This is your Care EveryWhere ID. This could be used by other organizations to access your [facility] medical records  [id number]        Your Vitals Were     Pulse                   65            Blood Pressure from Last 3 Encounters:   10/26/18 130/83   10/25/18 133/81   10/25/18 142/89    Weight from Last 3 Encounters:   10/25/18 (!) 337 lb 9.6 oz (153.1 kg)   10/19/18 (!) 338 lb (153.3 kg)    10/07/18 (!) 339 lb (153.8 kg)              We Performed the Following     C REPET TMS TX SUBSEQ MOTR THRESHLD W/DELIV & MNGT        Primary Care Provider Office Phone # Fax #    Ksenia Shady Lyles [phone] [address] [facility] [address]        Goals        General    I will continue to attend Psychiatry appointments for medication management, 1/14/2014 (pt-stated)     Notes - Note edited  8/24/2016  4:04 PM by Dalila Cavazos LSW    As of today's date 8/24/2016 goal is met at 51 - 75%.   Goal Status:  Ongoing Sees  Therapist every other week and Psych PA every 3 weeks.  As of today's date 5/25/2016 goal is met at 51 - 75%.   Goal Status:  Showing progress-  Meeting with Psych PA for second time tomorrow  To review meds As of today's date 4/11/2016 goal is met at 51 - 75%.   Goal Status:  Showing progress  As of today's date 1/14/2014 goal is met at 51 - 75%.   Goal Status:  Active        I will schedule therapy with new counselor, 1/14/2014 (pt-stated)     Notes - Note edited  5/25/2016  1:54 PM by Dalila Cavazos LSW    As of today's date 5/25/2016 goal is met at 76 - 100%.   Goal Status:  Ongoing New therapist and Psych PA on a regular basis  As of today's date 5/10/2016 goal is met at 76 - 100%.   Goal Status:  Ongoing met with new therapist at [facility]  As of today's date 4/11/2016 goal is met at 51 - 75%.   Goal Status:  Ongoing meets with therapist regularly  As of today's date 1/14/2014 goal is met at 0 - 25%.   Goal Status:  Active        Psychosocial I need some help with cleaning (pt-stated)     Notes - Note edited  6/23/2016  1:57 PM by Dalila Cavazos LSW    As of today's date 6/23/2016 goal is met at 26 - 50%.   Goal Status:  Showing progress met with [facility]. Has not yet made a decision  As of today's date 5/25/2016 goal is met at 0 - 25%.   Goal Status:  Ongoing agency had to reschedule appt.  As of today's date 5/10/2016 goal is met at 0 - 25%.    Goal Status:  Active referral to [facility]        Equal Access to Services     [facility] : Hadii floyd ashley orquideacrissy Justinali, luisda trejakubha, ofelia vilmahiteshisabell pulido. So [facility] [phone].    ATENCIÓN: Si habla español, tiene a faustin disposición servicios gratuitos de asistencia lingüística. Pattieame al [phone].    We comply with applicable federal civil rights laws and [place] laws. We do not discriminate on the basis of race, color, national origin, age, disability, sex, sexual orientation, or gender identity.            Thank you!     Thank you for choosing [facility]  for your care. Our goal is always to provide you with excellent care. Hearing back from our patients is one way we can continue to improve our services. Please take a few minutes to complete the written survey that you may receive in the mail after your visit with us. Thank you!             Your Updated Medication List - Protect others around you: Learn how to safely use, store and throw away your medicines at www.disposemymeds.org.          This list is accurate as of 10/26/18 12:10 PM.  Always use your most recent med list.                   Brand Name Dispense Instructions for use Diagnosis    acetaminophen 500 MG Caps     60 capsule    Take 500 mg by mouth every 4 hours as needed        ADVAIR DISKUS 250-50 MCG/DOSE diskus inhaler   Generic drug:  fluticasone-salmeterol      Inhale 1 puff into the lungs        * albuterol 108 (90 Base) MCG/ACT inhaler    PROAIR HFA/PROVENTIL HFA/VENTOLIN HFA     Inhale 2 puffs into the lungs every 4 hours as needed        * albuterol (2.5 MG/3ML) 0.083% neb solution      Inhale 2.5 mg into the lungs        ALPRAZolam 1 MG 24 hr tablet    XANAX XR     Take 1 mg by mouth every morning        atorvastatin 40 MG tablet    LIPITOR    90 tablet    TAKE 1 TABLET (40 MG) BY MOUTH DAILY    Mixed hyperlipidemia       azithromycin 250 MG tablet    ZITHROMAX    6 tablet     Two tabs today.  One tab daily x 4 days.    Upper respiratory tract infection, unspecified type       budesonide 0.5 MG/2ML neb solution    PULMICORT     Take 0.5 mg by nebulization 2 times daily        celecoxib 200 MG capsule    celeBREX    180 capsule    TAKE 1 CAPSULE BY MOUTH TWICE DAILY AS NEEDED FORMODERATE PAIN    Chronic midline low back pain without sciatica       cetirizine 10 MG tablet    zyrTEC    30 tablet    Take 1 tablet (10 mg) by mouth At Bedtime    Cough       cyanocobalamin 1000 MCG/ML injection    VITAMIN B12    10 mL    Inject 1 mL (1,000 mcg) into the muscle every 30 days    B12 deficiency       DELSYM PO           EPINEPHrine 0.3 MG/0.3ML injection 2-pack    EPIPEN/ADRENACLICK/or ANY BX GENERIC EQUIV    0.6 mL    Inject 0.3 mLs (0.3 mg) into the muscle once as needed for anaphylaxis    Food allergy       ginger root 550 MG Caps capsule      Take 550 mg by mouth Up to three times daily        hydrOXYzine 50 MG tablet    ATARAX     Take 50 mg by mouth daily        INFLIXIMAB IV           lamoTRIgine 25 MG tablet    LaMICtal     Take 200 mg by mouth daily        levothyroxine 75 MCG tablet    SYNTHROID/LEVOTHROID    90 tablet    TAKE 1 TABLET (75 MCG) BY MOUTH EVERY MORNING.    Acquired hypothyroidism       lidocaine (viscous) 2 % solution    XYLOCAINE    100 mL    Take 15 mLs by mouth every 2 hours as needed for moderate pain max 8 doses/24 hour period    Throat pain       loperamide 2 MG capsule    IMODIUM     Take 2 mg by mouth 4 times daily as needed for diarrhea        metoprolol succinate 200 MG 24 hr tablet    TOPROL-XL    180 tablet    Take 2 tablets (400 mg) by mouth daily    Essential hypertension with goal blood pressure less than 140/90       montelukast 10 MG tablet    SINGULAIR     Take 10 mg by mouth        omega-3 acid ethyl esters 1 g capsule    Lovaza    360 capsule    TAKE 2 CAPSULES BY MOUTH TWICE DAILY.    Hypertriglyceridemia       omeprazole 20 MG tablet      Take 20 mg by  mouth daily        order for DME     12 each    Equipment being ordered: 1 ml tuberculin syringes to be used for Vitamin B12 injections.    Vitamin B12 deficiency (non anaemic)       order for DME     1 Units    SI-loc belt    SI (sacroiliac) joint dysfunction       order for DME      [employer]ics REMSTAR 60 Series Auto CPAP 10-12 cm H2O, Wisp nasal mask w/a large cushion and a chinstrap        oxyCODONE IR 5 MG tablet    ROXICODONE    35 tablet    Take 1-2 tablets (5-10 mg) by mouth every 6 hours as needed for pain (maximum 4 tablet(s) per day)    Facet arthropathy       predniSONE 20 MG tablet    DELTASONE    15 tablet    Take 3 tablets (60 mg) by mouth daily    Upper respiratory tract infection, unspecified type       pregabalin 50 MG capsule    LYRICA    60 capsule    Take 1 capsule (50 mg) by mouth 2 times daily    Chronic midline low back pain without sciatica       pseudoePHEDrine 120 MG 12 hr tablet    SUDAFED    28 tablet    Take 1 tablet (120 mg) by mouth every 12 hours    Nasal congestion       ramipril 10 MG capsule    ALTACE    90 capsule    Take 1 capsule (10 mg) by mouth daily    Hypertension goal BP (blood pressure) < 140/90       risperiDONE 1 MG tablet    risperDAL     Take 0.5 mg by mouth daily        syringe (disposable) 1 ML Misc    BD TUBERCULIN SYRINGE    12 each    Equipment being ordered: 1 ml tuberculin syringes to be used for Vitamin B12 injections.    B12 deficiency       tiZANidine 4 MG tablet    ZANAFLEX    90 tablet    TAKE 1 TABLET BY MOUTH THREE TIMES DAILY AS NEEDED    Dorsalgia       tobramycin 0.3 % ophthalmic solution    TOBREX    2 mL    Place 1-2 drops into the right ear 2 times daily for 7 days    Infective otitis externa, right       vitamin D3 13769 UNITS capsule    CHOLECALCIFEROL    24 capsule    Take one capsule every two weeks.    Vitamin D deficiency       * Notice:  This list has 2 medication(s) that are the same as other medications prescribed for you. Read the  directions carefully, and ask your doctor or other care provider to review them with you.

## 2018-10-29 ENCOUNTER — TELEPHONE (OUTPATIENT)
Dept: PSYCHIATRY | Facility: CLINIC | Age: 43
End: 2018-10-29

## 2018-10-29 ENCOUNTER — OFFICE VISIT (OUTPATIENT)
Dept: PSYCHIATRY | Facility: CLINIC | Age: 43
End: 2018-10-29
Payer: COMMERCIAL

## 2018-10-29 VITALS — DIASTOLIC BLOOD PRESSURE: 82 MMHG | SYSTOLIC BLOOD PRESSURE: 125 MMHG | HEART RATE: 79 BPM

## 2018-10-29 DIAGNOSIS — F33.2 SEVERE EPISODE OF RECURRENT MAJOR DEPRESSIVE DISORDER, WITHOUT PSYCHOTIC FEATURES (H): Primary | ICD-10-CM

## 2018-10-29 ASSESSMENT — PATIENT HEALTH QUESTIONNAIRE - PHQ9: SUM OF ALL RESPONSES TO PHQ QUESTIONS 1-9: 18

## 2018-10-29 NOTE — MR AVS SNAPSHOT
After Visit Summary   10/29/2018    Joel Pineda    MRN: [id number]           Patient Information     Date Of Birth          1975        Visit Information        Provider Department      10/29/2018 10:00 AM [facility] PROCEDURE ROOM 2 [facility]        Today's Diagnoses     Severe episode of recurrent major depressive disorder, without psychotic features (H)    -  1       Follow-ups after your visit        Your next 10 appointments already scheduled     Oct 30, 2018 10:00 AM CDT   TMS TREATMENT with [facility] PROCEDURE ROOM 2   [facility] ([facility])    [address]  [facility] [address]   [phone]            Oct 31, 2018 10:00 AM CDT   TMS TREATMENT with ME [facility] PROCEDURE ROOM 2   P Psychiatry ([facility])    [address]  [facility] [address]   [phone]            Nov 01, 2018 10:00 AM CDT   TMS TREATMENT with ME [facility] PROCEDURE ROOM 2   [facility] ([facility])    [address]  [facility] [address]   [phone]            Nov 02, 2018 10:00 AM CDT   TMS TREATMENT with ME [facility] PROCEDURE ROOM 2   [facility] ([facility])    [address]  [facility] [address]   [phone]            Nov 05, 2018 10:00 AM CST   TMS TREATMENT with ME [facility] PROCEDURE ROOM 2   [facility] ([facility])    [address]  [facility] [address]   [phone]            Nov 06, 2018 10:00 AM CST   TMS TREATMENT with ME [facility] PROCEDURE ROOM 2   P Psychiatry ([facility])    [address]  [facility] [address]   [phone]            Nov 06, 2018  2:00 PM CST   Level 1 with [address] ([facility])    [id number] [place] [address]   [phone]            Nov 07, 2018 10:00 AM CST   TMS TREATMENT with ME [facility] PROCEDURE ROOM 2    [facility] Psychiatry ([facility])    [address]  [facility] [address]   [phone]            Nov 08, 2018 10:00 AM CST   TMS TREATMENT with ME [facility] PROCEDURE ROOM 2   [facility] ([facility])    [address]  [facility] [address]   [phone]            Nov 09, 2018 10:00 AM CST   TMS TREATMENT with ME [facility] PROCEDURE ROOM 2   [facility] Psychiatry [facility])    [address]  [facility] [address]   [phone]              Who to contact     Please call your clinic at [phone] to:    Ask questions about your health    Make or cancel appointments    Discuss your medicines    Learn about your test results    Speak to your doctor            Additional Information About Your Visit        [employer] Medical Information     [employer] gives you secure access to your electronic health record. If you see a primary care provider, you can also send messages to your care team and make appointments. If you have questions, please call your primary care clinic.  If you do not have a primary care provider, please call [phone] and they will assist you.      [employer] is an electronic gateway that provides easy, online access to your medical records. With [employer], you can request a clinic appointment, read your test results, renew a prescription or communicate with your care team.     To access your existing account, please contact your [facility] or call [phone] for assistance.        Care EveryWhere ID     This is your Care EveryWhere ID. This could be used by other organizations to access your [facility] medical records  [id number]        Your Vitals Were     Pulse                   79            Blood Pressure from Last 3 Encounters:   10/29/18 125/82   10/26/18 130/83   10/25/18 133/81    Weight from Last 3 Encounters:   10/25/18 (!) 337 lb 9.6 oz (153.1 kg)   10/19/18 (!) 338 lb (153.3 kg)    10/07/18 (!) 339 lb (153.8 kg)              We Performed the Following     C TRANSCRANIAL MAGNETIC STIMULATION TREATMENT,DELIVERY/MANAGEMENT        Primary Care Provider Office Phone # Fax #    Ksenia Shady Lyles [phone] [address] [facility] [address]        Goals        General    I will continue to attend Psychiatry appointments for medication management, 1/14/2014 (pt-stated)     Notes - Note edited  8/24/2016  4:04 PM by Dalila Cavazos LSW    As of today's date 8/24/2016 goal is met at 51 - 75%.   Goal Status:  Ongoing Sees  Therapist every other week and Psych PA every 3 weeks.  As of today's date 5/25/2016 goal is met at 51 - 75%.   Goal Status:  Showing progress-  Meeting with Psych PA for second time tomorrow  To review meds As of today's date 4/11/2016 goal is met at 51 - 75%.   Goal Status:  Showing progress  As of today's date 1/14/2014 goal is met at 51 - 75%.   Goal Status:  Active        I will schedule therapy with new counselor, 1/14/2014 (pt-stated)     Notes - Note edited  5/25/2016  1:54 PM by Dalila Cavazos LSW    As of today's date 5/25/2016 goal is met at 76 - 100%.   Goal Status:  Ongoing New therapist and Psych PA on a regular basis  As of today's date 5/10/2016 goal is met at 76 - 100%.   Goal Status:  Ongoing met with new therapist at [facility]  As of today's date 4/11/2016 goal is met at 51 - 75%.   Goal Status:  Ongoing meets with therapist regularly  As of today's date 1/14/2014 goal is met at 0 - 25%.   Goal Status:  Active        Psychosocial I need some help with cleaning (pt-stated)     Notes - Note edited  6/23/2016  1:57 PM by Dalila Cavazos LSW    As of today's date 6/23/2016 goal is met at 26 - 50%.   Goal Status:  Showing progress met with [facility]. Has not yet made a decision  As of today's date 5/25/2016 goal is met at 0 - 25%.   Goal Status:  Ongoing agency had to reschedule appt.  As of today's date 5/10/2016 goal is  met at 0 - 25%.   Goal Status:  Active referral to [facility]        Equal Access to Services     ANNA CANO : Hadii floyd ashley orquideacrissy Brielleisaac, luisfabrice thompson, ofelia vilmahiteshfabrice ojeda, isabell torres haymaureen matadrakeha echevarria. So [facility] [phone].    ATENCIÓN: Si habla español, tiene a faustin disposición servicios gratuitos de asistencia lingüística. Llame al [phone].    We comply with applicable federal civil rights laws and [place] laws. We do not discriminate on the basis of race, color, national origin, age, disability, sex, sexual orientation, or gender identity.            Thank you!     Thank you for choosing [facility]  for your care. Our goal is always to provide you with excellent care. Hearing back from our patients is one way we can continue to improve our services. Please take a few minutes to complete the written survey that you may receive in the mail after your visit with us. Thank you!             Your Updated Medication List - Protect others around you: Learn how to safely use, store and throw away your medicines at www.disposemymeds.org.          This list is accurate as of 10/29/18  4:29 PM.  Always use your most recent med list.                   Brand Name Dispense Instructions for use Diagnosis    acetaminophen 500 MG Caps     60 capsule    Take 500 mg by mouth every 4 hours as needed        ADVAIR DISKUS 250-50 MCG/DOSE diskus inhaler   Generic drug:  fluticasone-salmeterol      Inhale 1 puff into the lungs        * albuterol 108 (90 Base) MCG/ACT inhaler    PROAIR HFA/PROVENTIL HFA/VENTOLIN HFA     Inhale 2 puffs into the lungs every 4 hours as needed        * albuterol (2.5 MG/3ML) 0.083% neb solution      Inhale 2.5 mg into the lungs        ALPRAZolam 1 MG 24 hr tablet    XANAX XR     Take 1 mg by mouth every morning        atorvastatin 40 MG tablet    LIPITOR    90 tablet    TAKE 1 TABLET (40 MG) BY MOUTH DAILY    Mixed hyperlipidemia       azithromycin 250 MG tablet    ZITHROMAX     6 tablet    Two tabs today.  One tab daily x 4 days.    Upper respiratory tract infection, unspecified type       budesonide 0.5 MG/2ML neb solution    PULMICORT     Take 0.5 mg by nebulization 2 times daily        celecoxib 200 MG capsule    celeBREX    180 capsule    TAKE 1 CAPSULE BY MOUTH TWICE DAILY AS NEEDED FORMODERATE PAIN    Chronic midline low back pain without sciatica       cetirizine 10 MG tablet    zyrTEC    30 tablet    Take 1 tablet (10 mg) by mouth At Bedtime    Cough       cyanocobalamin 1000 MCG/ML injection    VITAMIN B12    10 mL    Inject 1 mL (1,000 mcg) into the muscle every 30 days    B12 deficiency       DELSYM PO           EPINEPHrine 0.3 MG/0.3ML injection 2-pack    EPIPEN/ADRENACLICK/or ANY BX GENERIC EQUIV    0.6 mL    Inject 0.3 mLs (0.3 mg) into the muscle once as needed for anaphylaxis    Food allergy       ginger root 550 MG Caps capsule      Take 550 mg by mouth Up to three times daily        hydrOXYzine 50 MG tablet    ATARAX     Take 50 mg by mouth daily        INFLIXIMAB IV           lamoTRIgine 25 MG tablet    LaMICtal     Take 200 mg by mouth daily        levothyroxine 75 MCG tablet    SYNTHROID/LEVOTHROID    90 tablet    TAKE 1 TABLET (75 MCG) BY MOUTH EVERY MORNING.    Acquired hypothyroidism       lidocaine (viscous) 2 % solution    XYLOCAINE    100 mL    Take 15 mLs by mouth every 2 hours as needed for moderate pain max 8 doses/24 hour period    Throat pain       loperamide 2 MG capsule    IMODIUM     Take 2 mg by mouth 4 times daily as needed for diarrhea        metoprolol succinate 200 MG 24 hr tablet    TOPROL-XL    180 tablet    Take 2 tablets (400 mg) by mouth daily    Essential hypertension with goal blood pressure less than 140/90       montelukast 10 MG tablet    SINGULAIR     Take 10 mg by mouth        omega-3 acid ethyl esters 1 g capsule    Lovaza    360 capsule    TAKE 2 CAPSULES BY MOUTH TWICE DAILY.    Hypertriglyceridemia       omeprazole 20 MG tablet       Take 20 mg by mouth daily        order for DME     12 each    Equipment being ordered: 1 ml tuberculin syringes to be used for Vitamin B12 injections.    Vitamin B12 deficiency (non anaemic)       order for DME     1 Units    SI-loc belt    SI (sacroiliac) joint dysfunction       order for DME      Respironics REMSTAR 60 Series Auto CPAP 10-12 cm H2O, Wisp nasal mask w/a large cushion and a chinstrap        oxyCODONE IR 5 MG tablet    ROXICODONE    35 tablet    Take 1-2 tablets (5-10 mg) by mouth every 6 hours as needed for pain (maximum 4 tablet(s) per day)    Facet arthropathy       predniSONE 20 MG tablet    DELTASONE    15 tablet    Take 3 tablets (60 mg) by mouth daily    Upper respiratory tract infection, unspecified type       pregabalin 50 MG capsule    LYRICA    60 capsule    Take 1 capsule (50 mg) by mouth 2 times daily    Chronic midline low back pain without sciatica       pseudoePHEDrine 120 MG 12 hr tablet    SUDAFED    28 tablet    Take 1 tablet (120 mg) by mouth every 12 hours    Nasal congestion       ramipril 10 MG capsule    ALTACE    90 capsule    Take 1 capsule (10 mg) by mouth daily    Hypertension goal BP (blood pressure) < 140/90       risperiDONE 1 MG tablet    risperDAL     Take 0.5 mg by mouth daily        syringe (disposable) 1 ML Misc    BD TUBERCULIN SYRINGE    12 each    Equipment being ordered: 1 ml tuberculin syringes to be used for Vitamin B12 injections.    B12 deficiency       tiZANidine 4 MG tablet    ZANAFLEX    90 tablet    TAKE 1 TABLET BY MOUTH THREE TIMES DAILY AS NEEDED    Dorsalgia       tobramycin 0.3 % ophthalmic solution    TOBREX    2 mL    Place 1-2 drops into the right ear 2 times daily for 7 days    Infective otitis externa, right       vitamin D3 18083 UNITS capsule    CHOLECALCIFEROL    24 capsule    Take one capsule every two weeks.    Vitamin D deficiency       * Notice:  This list has 2 medication(s) that are the same as other medications prescribed for you.  Read the directions carefully, and ask your doctor or other care provider to review them with you.

## 2018-10-29 NOTE — TELEPHONE ENCOUNTER
-Writer received notification from TMS  that patient had some questions, writer called patient.     -Patient states that he has been having headaches almost daily after TMS.  States that he has been taking prn pain medication for them.      -He reported that several hours after he got home on Friday that he became very nauseous and had a migraine.  States that he took zofran, deya and peppermint pills which only lowered it about 50%.       -Patient states that he is going to follow up with his PCP on the  Migraines.  States that it is possible that he might have a diagnosis, as he has had some in the past.      -States anxiety has been better, and he is actively working on this with his DBT .        -Will route to provider.

## 2018-10-29 NOTE — PROGRESS NOTES
[facility]  [address]  [place] [address]  TMS Procedure Note   Joel Pineda MRN# [id number]  Age: 43 year old year old YOB: 1975    Pre-Procedure:  History and Physical: Reviewed in medical record  Consent Signed by: Joel Pineda  On: 10/19/18    Clinical Narrative:  Patient tolerating treatments.  Patient reported extreme headache and nausea Friday afternoon.  (Sounds migrainous.)    Indications for TMS:  MDD, recurrent, severe; 4+ medication trials (from 2+ classes) ineffective; Psychotherapy ineffective.    Pre-Procedure Diagnosis:  MDD, recurrent, severe F33.2    Treatment Hx:  Treatment number this series: 7  Total lifetime treatment number: 7    Allergies   Allergen Reactions     Banana Shortness Of Breath     Pt reports organic Banana is okay.      Nitroglycerin Palpitations     Penicillins Anaphylaxis     Provigil [Modafinil] Shortness Of Breath     headache     Ciprofloxacin Palpitations, Other (See Comments) and Rash     dizziness (versus metronidazole taken at same time)     Gadolinium Hives and Itching     Patient was premedicated for the contrast allergy. He did still have a reaction a few hours after injection. Hives and itching. Dr. Gomez told tech to inform pt he should only have contrast again in the future when premedicated and at a hospital. Not at an outpatient facility.      Dulera Other (See Comments)     Hoarse voice     Dye [Contrast Dye] Other (See Comments) and Hives     Moderate flushing, CT contrast     Levaquin Other (See Comments)     tendonitis     Neurontin [Gabapentin] Hives     Moderate hives     Nortriptyline Hives     Varicella Virus Vaccine Live      Rash     Ciprofloxacin Palpitations     Flagyl [Metronidazole Hcl] Palpitations and Hives     Latex Rash     Metronidazole Palpitations, Other (See Comments) and Rash     dizziness (versus ciprofloxacin taken at same time)     No Clinical Screening - See Comments Rash      Nitrile gloves      /82 (BP Location: Right arm, Patient Position: Sitting, Cuff Size: Adult Large)  Pulse 79    Pause for the Cause  Right patient:  Yes  Right procedure/correct coil:  Yes; rTMS; cpt 19988; H1 coil.   Earplugs in place:  Yes    Procedure  Patient was seated in procedure chair. Identity and procedure was verified. Ear plugs were placed in ears and patient-specific cap was placed on head and tightened appropriately. Ruler locations were verified. Coil was placed at treatment location and stimulator was set to parameters described below. A test train was delivered and pt tolerated train. Given pt tolerance, 50 treatment trains were delivered. Pt tolerated procedure .    Motor Threshold Determination  Distance from nasion to inion:   Tragus to tragus distance:   Head circumference:   MT 1: C3 @ 65% on 10/19/18  MT 2: C3 @ 62% on 10/26/18    Stimulation Parameters  Frequency: 1 Hz     Train duration: 60 sec  Total pulses delivered: 1980  Inter-train interval: 1 sec  Tx Loc: F4  Energy: 68% (110% MT)  Trains: 50 trains        Post-Procedure Diagnosis:  MDD, recurrent, severe F33.2    Heather Zamorano  [facility]    Plan   - Cont TMS    I did not see the patient before/during the treatment, but remained available in the clinic throughout the treatment.    Cleo Brito MD  [facility]

## 2018-10-30 ENCOUNTER — TELEPHONE (OUTPATIENT)
Dept: PSYCHIATRY | Facility: CLINIC | Age: 43
End: 2018-10-30

## 2018-10-30 ENCOUNTER — OFFICE VISIT (OUTPATIENT)
Dept: FAMILY MEDICINE | Facility: CLINIC | Age: 43
End: 2018-10-30
Payer: COMMERCIAL

## 2018-10-30 VITALS
HEART RATE: 81 BPM | WEIGHT: 315 LBS | TEMPERATURE: 97.8 F | BODY MASS INDEX: 40.2 KG/M2 | RESPIRATION RATE: 18 BRPM | OXYGEN SATURATION: 96 % | SYSTOLIC BLOOD PRESSURE: 139 MMHG | DIASTOLIC BLOOD PRESSURE: 87 MMHG

## 2018-10-30 DIAGNOSIS — M54.50 CHRONIC MIDLINE LOW BACK PAIN WITHOUT SCIATICA: ICD-10-CM

## 2018-10-30 DIAGNOSIS — G89.29 CHRONIC MIDLINE LOW BACK PAIN WITHOUT SCIATICA: ICD-10-CM

## 2018-10-30 DIAGNOSIS — G43.109 MIGRAINE WITH AURA AND WITHOUT STATUS MIGRAINOSUS, NOT INTRACTABLE: Primary | ICD-10-CM

## 2018-10-30 PROCEDURE — 99214 OFFICE O/P EST MOD 30 MIN: CPT | Performed by: INTERNAL MEDICINE

## 2018-10-30 RX ORDER — RIZATRIPTAN BENZOATE 5 MG/1
5 TABLET, ORALLY DISINTEGRATING ORAL
Qty: 30 TABLET | Refills: 5 | Status: SHIPPED | OUTPATIENT
Start: 2018-10-30 | End: 2019-12-18

## 2018-10-30 RX ORDER — PREGABALIN 100 MG/1
100 CAPSULE ORAL 2 TIMES DAILY
Qty: 60 CAPSULE | Refills: 5 | Status: SHIPPED | OUTPATIENT
Start: 2018-10-30 | End: 2019-01-04 | Stop reason: DRUGHIGH

## 2018-10-30 ASSESSMENT — PAIN SCALES - GENERAL: PAINLEVEL: NO PAIN (0)

## 2018-10-30 NOTE — TELEPHONE ENCOUNTER
-Writer received notification from scheduling that patient cancelled all TMS treatments due to migraines.  Writer attempted to call patient to discuss.  Received voicemail, left message asking for a return call.  Clinic number provided.

## 2018-10-30 NOTE — PATIENT INSTRUCTIONS
At [facility], we strive to deliver an exceptional experience to you, every time we see you.  If you receive a survey in the mail, please send us back your thoughts. We really do value your feedback.    Based on your medical history, these are the current health maintenance/preventive care services that you are due for (some may have been done at this visit.)  Health Maintenance Due   Topic Date Due     HIV SCREEN (SYSTEM ASSIGNED)  07/01/1991     EYE EXAM Q1 YEAR  04/04/2018     INFLUENZA VACCINE (1) 09/01/2018         Suggested websites for health information:  Www.[employer] : Up to date and easily searchable information on multiple topics.  Www.[employer].gov : medication info, interactive tutorials, watch real surgeries online  Www.familydoctor.org : good info from the Academy of Family Physicians  Www.cdc.gov : public health info, travel advisories, epidemics (H1N1)  Www.aap.org : children's health info, normal development, vaccinations  Www.health.[facility].[place].us : [place] dept of health, public health issues in [place], N1N1    Your care team:                            Family Medicine Internal Medicine   MD Houston Frances MD Shantel Branch-Fleming, MD Katya Georgiev PA-C Nam Ho, MD Pediatrics   SHERWIN Hastings, GLORIA Vega APRN CNP   MD Humaira Devlin MD Deborah Mielke, MD Kim Thein, APRN CNP      Clinic hours: Monday - Thursday 7 am-7 pm; Fridays 7 am-5 pm.   Urgent care: Monday - Friday 11 am-9 pm; Saturday and Sunday 9 am-5 pm.  Pharmacy : Monday -Thursday 8 am-8 pm; Friday 8 am-6 pm; Saturday and Sunday 9 am-5 pm.     Clinic: [phone]   Pharmacy: [phone]

## 2018-10-30 NOTE — MR AVS SNAPSHOT
After Visit Summary   10/30/2018    Joel Pineda    MRN: [id number]           Patient Information     Date Of Birth          1975        Visit Information        Provider Department      10/30/2018 10:00 AM Houston Koroma MD [facility]        Today's Diagnoses     Migraine with aura and without status migrainosus, not intractable    -  1    Chronic midline low back pain without sciatica          Care Instructions    At [facility], we strive to deliver an exceptional experience to you, every time we see you.  If you receive a survey in the mail, please send us back your thoughts. We really do value your feedback.    Based on your medical history, these are the current health maintenance/preventive care services that you are due for (some may have been done at this visit.)  Health Maintenance Due   Topic Date Due     HIV SCREEN (SYSTEM ASSIGNED)  07/01/1991     EYE EXAM Q1 YEAR  04/04/2018     INFLUENZA VACCINE (1) 09/01/2018         Suggested websites for health information:  Www.ABS Medical : Up to date and easily searchable information on multiple topics.  Www.medlineplus.gov : medication info, interactive tutorials, watch real surgeries online  Www.familydoctor.org : good info from the Academy of Family Physicians  Www.cdc.gov : public health info, travel advisories, epidemics (H1N1)  Www.aap.org : children's health info, normal development, vaccinations  Www.health.state.[place].us : [place] dept of health, public health issues in [place], N1N1    Your care team:                            Family Medicine Internal Medicine   MD Houston Frances MD Shantel Branch-Fleming, MD Katya Georgiev PA-C Nam Ho, MD Pediatrics   SHERWIN Hastings, MD Humaira Gordon CNP, MD Deborah Mielke, MD Kim Thein, APRMAHENDRA CASTRO      Clinic hours: Monday - Thursday 7 am-7 pm; Fridays 7 am-5 pm.   Urgent  care: Monday - Friday 11 am-9 pm; Saturday and Sunday 9 am-5 pm.  Pharmacy : Monday -Thursday 8 am-8 pm; Friday 8 am-6 pm; Saturday and Sunday 9 am-5 pm.     Clinic: [phone]   Pharmacy: [phone]            Follow-ups after your visit        Your next 10 appointments already scheduled     Oct 31, 2018 10:00 AM CDT   TMS TREATMENT with ME UMP PROCEDURE ROOM 2   P Psychiatry ([facility])    [address] [place] [address]   [phone]            Nov 01, 2018 10:00 AM CDT   TMS TREATMENT with ME UMP PROCEDURE ROOM 2   [facility] Psychiatry ([facility])    [address] [place] [address]   [phone]            Nov 02, 2018 10:00 AM CDT   TMS TREATMENT with ME UMP PROCEDURE ROOM 2   [facility] Psychiatry ([facility])    [address] [place] [address]   [phone]            Nov 05, 2018 10:00 AM CST   TMS TREATMENT with ME UMP PROCEDURE ROOM 2   [facility] Psychiatry ([facility])    [address] [place] [address]   [phone]            Nov 06, 2018 10:00 AM CST   TMS TREATMENT with ME UMP PROCEDURE ROOM 2   [facility] Psychiatry ([facility])    [address] [place] [address]   [phone]            Nov 06, 2018  2:00 PM CST   Level 1 with [address] ([facility])    [address] [place] [id number] [phone]            Nov 07, 2018 10:00 AM CST   TMS TREATMENT with ME UMP PROCEDURE ROOM 2   [facility] Psychiatry ([facility])    [address] [place] [address]   [phone]            Nov 08, 2018 10:00 AM CST   TMS TREATMENT with ME UMP PROCEDURE ROOM 2   [facility] Psychiatry ([facility])    [address] [place] [address]   [phone]            Nov 09, 2018 10:00 AM CST   TMS TREATMENT with  ME [facility] PROCEDURE ROOM 2   [facility] Psychiatry ([facility])    [address]  [facility] [address] [phone]            Nov 12, 2018 10:00 AM CST   TMS TREATMENT with ME [facility] PROCEDURE ROOM 2   [facility] Psychiatry ([facility])    [address]  [facility] [address]   [phone]              Who to contact     If you have questions or need follow up information about today's clinic visit or your schedule please contact [facility] DENISE WARNER directly at [phone].  Normal or non-critical lab and imaging results will be communicated to you by [employer], letter or phone within 4 business days after the clinic has received the results. If you do not hear from us within 7 days, please contact the clinic through [employer] or phone. If you have a critical or abnormal lab result, we will notify you by phone as soon as possible.  Submit refill requests through [employer] or call your pharmacy and they will forward the refill request to us. Please allow 3 business days for your refill to be completed.          Additional Information About Your Visit        [employer] Information     [employer] gives you secure access to your electronic health record. If you see a primary care provider, you can also send messages to your care team and make appointments. If you have questions, please call your primary care clinic.  If you do not have a primary care provider, please call [phone] and they will assist you.        Care EveryWhere ID     This is your Care EveryWhere ID. This could be used by other organizations to access your [facility] medical records  [id number]        Your Vitals Were     Pulse Temperature Respirations Pulse Oximetry BMI (Body Mass Index)       81 97.8  F (36.6  C) (Oral) 18 96% 40.2 kg/m2        Blood Pressure from Last 3 Encounters:   10/30/18 139/87   10/29/18 125/82   10/26/18 130/83    Weight from Last 3 Encounters:   10/30/18 (!) 339 lb  (153.8 kg)   10/25/18 (!) 337 lb 9.6 oz (153.1 kg)   10/19/18 (!) 338 lb (153.3 kg)              Today, you had the following     No orders found for display         Today's Medication Changes          These changes are accurate as of 10/30/18 10:31 AM.  If you have any questions, ask your nurse or doctor.               Start taking these medicines.        Dose/Directions    rizatriptan 5 MG ODT tab   Commonly known as:  MAXALT-MLT   Used for:  Migraine with aura and without status migrainosus, not intractable   Started by:  Houston Koroma MD        Dose:  5 mg   Take 1 tablet (5 mg) by mouth at onset of headache for migraine   Quantity:  30 tablet   Refills:  5         These medicines have changed or have updated prescriptions.        Dose/Directions    predniSONE 20 MG tablet   Commonly known as:  DELTASONE   This may have changed:  how much to take   Used for:  Upper respiratory tract infection, unspecified type        Dose:  60 mg   Take 3 tablets (60 mg) by mouth daily   Quantity:  15 tablet   Refills:  0       pregabalin 100 MG capsule   Commonly known as:  LYRICA   This may have changed:    - medication strength  - how much to take   Used for:  Chronic midline low back pain without sciatica   Changed by:  Houston Koroma MD        Dose:  100 mg   Take 1 capsule (100 mg) by mouth 2 times daily   Quantity:  60 capsule   Refills:  5            Where to get your medicines      These medications were sent to [id number] - [place], [place] - [address]  [address], [facility] [address]     Phone:  [phone]     rizatriptan 5 MG ODT tab         Some of these will need a paper prescription and others can be bought over the counter.  Ask your nurse if you have questions.     Bring a paper prescription for each of these medications     pregabalin 100 MG capsule                Primary Care Provider Office Phone # Fax #    Ksenia Lyles [phone]  [phone]       [address] [facility] [address]        Goals        General    I will continue to attend Psychiatry appointments for medication management, 1/14/2014 (pt-stated)     Notes - Note edited  8/24/2016  4:04 PM by Dalila Cavazos LSW    As of today's date 8/24/2016 goal is met at 51 - 75%.   Goal Status:  Ongoing Sees  Therapist every other week and Psych PA every 3 weeks.  As of today's date 5/25/2016 goal is met at 51 - 75%.   Goal Status:  Showing progress-  Meeting with Psych PA for second time tomorrow  To review meds As of today's date 4/11/2016 goal is met at 51 - 75%.   Goal Status:  Showing progress  As of today's date 1/14/2014 goal is met at 51 - 75%.   Goal Status:  Active        I will schedule therapy with new counselor, 1/14/2014 (pt-stated)     Notes - Note edited  5/25/2016  1:54 PM by Dalila Cavazos LSW    As of today's date 5/25/2016 goal is met at 76 - 100%.   Goal Status:  Ongoing New therapist and Psych PA on a regular basis  As of today's date 5/10/2016 goal is met at 76 - 100%.   Goal Status:  Ongoing met with new therapist at [facility]  As of today's date 4/11/2016 goal is met at 51 - 75%.   Goal Status:  Ongoing meets with therapist regularly  As of today's date 1/14/2014 goal is met at 0 - 25%.   Goal Status:  Active        Psychosocial I need some help with cleaning (pt-stated)     Notes - Note edited  6/23/2016  1:57 PM by Dalila Cavazos LSW    As of today's date 6/23/2016 goal is met at 26 - 50%.   Goal Status:  Showing progress met with [employer]. Has not yet made a decision  As of today's date 5/25/2016 goal is met at 0 - 25%.   Goal Status:  Ongoing agency had to reschedule appt.  As of today's date 5/10/2016 goal is met at 0 - 25%.   Goal Status:  Active referral to [facility]        Equal Access to Services     ANNA CANO AH: Madhuri Darby, fide thompson, isabell maloney. So pau  [phone].    ATENCIÓN: Si judith esteban, tiene a faustin disposición servicios gratuitos de asistencia lingüística. Madelyn eduardo [phone].    We comply with applicable federal civil rights laws and [place] laws. We do not discriminate on the basis of race, color, national origin, age, disability, sex, sexual orientation, or gender identity.            Thank you!     Thank you for choosing [facility]  for your care. Our goal is always to provide you with excellent care. Hearing back from our patients is one way we can continue to improve our services. Please take a few minutes to complete the written survey that you may receive in the mail after your visit with us. Thank you!             Your Updated Medication List - Protect others around you: Learn how to safely use, store and throw away your medicines at www.disposemymeds.org.          This list is accurate as of 10/30/18 10:31 AM.  Always use your most recent med list.                   Brand Name Dispense Instructions for use Diagnosis    acetaminophen 500 MG Caps     60 capsule    Take 500 mg by mouth every 4 hours as needed        ADVAIR DISKUS 250-50 MCG/DOSE diskus inhaler   Generic drug:  fluticasone-salmeterol      Inhale 1 puff into the lungs        * albuterol 108 (90 Base) MCG/ACT inhaler    PROAIR HFA/PROVENTIL HFA/VENTOLIN HFA     Inhale 2 puffs into the lungs every 4 hours as needed        * albuterol (2.5 MG/3ML) 0.083% neb solution      Inhale 2.5 mg into the lungs        ALPRAZolam 1 MG 24 hr tablet    XANAX XR     Take 1 mg by mouth every morning        atorvastatin 40 MG tablet    LIPITOR    90 tablet    TAKE 1 TABLET (40 MG) BY MOUTH DAILY    Mixed hyperlipidemia       azithromycin 250 MG tablet    ZITHROMAX    6 tablet    Two tabs today.  One tab daily x 4 days.    Upper respiratory tract infection, unspecified type       budesonide 0.5 MG/2ML neb solution    PULMICORT     Take 0.5 mg by nebulization 2 times daily         celecoxib 200 MG capsule    celeBREX    180 capsule    TAKE 1 CAPSULE BY MOUTH TWICE DAILY AS NEEDED FORMODERATE PAIN    Chronic midline low back pain without sciatica       cetirizine 10 MG tablet    zyrTEC    30 tablet    Take 1 tablet (10 mg) by mouth At Bedtime    Cough       cyanocobalamin 1000 MCG/ML injection    VITAMIN B12    10 mL    Inject 1 mL (1,000 mcg) into the muscle every 30 days    B12 deficiency       DELSYM PO           EPINEPHrine 0.3 MG/0.3ML injection 2-pack    EPIPEN/ADRENACLICK/or ANY BX GENERIC EQUIV    0.6 mL    Inject 0.3 mLs (0.3 mg) into the muscle once as needed for anaphylaxis    Food allergy       ginger root 550 MG Caps capsule      Take 550 mg by mouth Up to three times daily        hydrOXYzine 50 MG tablet    ATARAX     Take 50 mg by mouth daily        INFLIXIMAB IV           lamoTRIgine 25 MG tablet    LaMICtal     Take 200 mg by mouth daily        levothyroxine 75 MCG tablet    SYNTHROID/LEVOTHROID    90 tablet    TAKE 1 TABLET (75 MCG) BY MOUTH EVERY MORNING.    Acquired hypothyroidism       lidocaine (viscous) 2 % solution    XYLOCAINE    100 mL    Take 15 mLs by mouth every 2 hours as needed for moderate pain max 8 doses/24 hour period    Throat pain       loperamide 2 MG capsule    IMODIUM     Take 2 mg by mouth 4 times daily as needed for diarrhea        metoprolol succinate 200 MG 24 hr tablet    TOPROL-XL    180 tablet    Take 2 tablets (400 mg) by mouth daily    Essential hypertension with goal blood pressure less than 140/90       montelukast 10 MG tablet    SINGULAIR     Take 10 mg by mouth        omega-3 acid ethyl esters 1 g capsule    Lovaza    360 capsule    TAKE 2 CAPSULES BY MOUTH TWICE DAILY.    Hypertriglyceridemia       omeprazole 20 MG tablet      Take 20 mg by mouth daily        order for DME     12 each    Equipment being ordered: 1 ml tuberculin syringes to be used for Vitamin B12 injections.    Vitamin B12 deficiency (non anaemic)       order for DME     1  Units    SI-loc belt    SI (sacroiliac) joint dysfunction       order for DME      [employer] REMSTAR 60 Series Auto CPAP 10-12 cm H2O, Wisp nasal mask w/a large cushion and a chinstrap        oxyCODONE IR 5 MG tablet    ROXICODONE    35 tablet    Take 1-2 tablets (5-10 mg) by mouth every 6 hours as needed for pain (maximum 4 tablet(s) per day)    Facet arthropathy       predniSONE 20 MG tablet    DELTASONE    15 tablet    Take 3 tablets (60 mg) by mouth daily    Upper respiratory tract infection, unspecified type       pregabalin 100 MG capsule    LYRICA    60 capsule    Take 1 capsule (100 mg) by mouth 2 times daily    Chronic midline low back pain without sciatica       pseudoePHEDrine 120 MG 12 hr tablet    SUDAFED    28 tablet    Take 1 tablet (120 mg) by mouth every 12 hours    Nasal congestion       ramipril 10 MG capsule    ALTACE    90 capsule    Take 1 capsule (10 mg) by mouth daily    Hypertension goal BP (blood pressure) < 140/90       risperiDONE 1 MG tablet    risperDAL     Take 0.5 mg by mouth daily Take 0.25 mg        rizatriptan 5 MG ODT tab    MAXALT-MLT    30 tablet    Take 1 tablet (5 mg) by mouth at onset of headache for migraine    Migraine with aura and without status migrainosus, not intractable       syringe (disposable) 1 ML Misc    BD TUBERCULIN SYRINGE    12 each    Equipment being ordered: 1 ml tuberculin syringes to be used for Vitamin B12 injections.    B12 deficiency       tiZANidine 4 MG tablet    ZANAFLEX    90 tablet    TAKE 1 TABLET BY MOUTH THREE TIMES DAILY AS NEEDED    Dorsalgia       tobramycin 0.3 % ophthalmic solution    TOBREX    2 mL    Place 1-2 drops into the right ear 2 times daily for 7 days    Infective otitis externa, right       vitamin D3 44166 UNITS capsule    CHOLECALCIFEROL    24 capsule    Take one capsule every two weeks.    Vitamin D deficiency       * Notice:  This list has 2 medication(s) that are the same as other medications prescribed for you. Read the  directions carefully, and ask your doctor or other care provider to review them with you.

## 2018-10-30 NOTE — PROGRESS NOTES
SUBJECTIVE:   Joel Pineda is a 43 year old male who presents to clinic today for the following health issues:      Headache  Onset: 2 MONTHS AGO    Description:   Location: all over   Character: throbbing pain  Frequency: Twice a week  Duration:  6-7 hurs    Intensity: severe    Progression of Symptoms:  worsening    Accompanying Signs & Symptoms:  Stiff neck: no   Neck or upper back pain: no   Fever: no   Sinus pressure: no   Nausea or vomiting: YES- nausea  Dizziness: YES  Numbness: no   Weakness: no   Visual changes: YES- slight    History:   Head trauma: YES- TMS procedure  Family history of migraines: YES  Previous tests for headaches: no   Neurologist evaluations: no   Able to do daily activities: no   Wake with a headaches: YES  Do headaches wake you up: YES  Daily pain medication use: YES  Work/school stressors/changes: no    Precipitating factors:   Does light make it worse: no  Does sound make it worse: no    Alleviating factors:  Does sleep help: no   Therapies Tried and outcome: Oxycodone (effective, but previously prescribed for lumbar facet arthropathy).      Problem list and histories reviewed & adjusted, as indicated.  Additional history: as documented    Patient Active Problem List   Diagnosis     Major depressive disorder, recurrent episode (H)     Intermittent asthma     Hyperlipidemia LDL goal <130     Hypertension goal BP (blood pressure) < 140/90     Chronic nonallergic rhinitis     History of diverticulitis of colon     Obesity (BMI 30-39.9)     Health Care Home     PE (pulmonary embolism)     Diverticulosis     GERD (gastroesophageal reflux disease)     Anxiety     LLOYD (obstructive sleep apnea)- mild (AHI 11)     Intracranial arachnoid cyst     Facet arthritis of cervical region (H)     Acquired hypothyroidism     Bipolar 2 disorder (H)     Allergic rhinitis, unspecified allergic rhinitis type     Chronic midline low back pain without sciatica     Irritable bowel syndrome with diarrhea      B12 deficiency     Impaired glucose tolerance     Essential hypertension with goal blood pressure less than 140/90     Psychophysiological insomnia     Chronic pain syndrome     Ankylosing spondylitis of sacral region (H)     Morbid obesity due to hypertriglyceridemia (H)     Fatty infiltration of liver     Past Surgical History:   Procedure Laterality Date     BACK SURGERY  10/07    lumbar discectomy L5-S1     COLONOSCOPY      Note: colonoscopy scheduled with [facility] on Friday, 9/4/15     COSMETIC SURGERY  2012    Nose Exterior - functional     GI SURGERY  August 2013    Sigmoidectomy     HERNIA REPAIR, UMBILICAL  8/23/11    henok, Dr. Evan Beavers     INCISION AND DRAINAGE, ABSCESS, COMPLEX  8/23/11    umbilical, Dr. Evan Beavers     LAPAROSCOPIC ASSISTED COLECTOMY LEFT (DESCENDING)  8/15/2013    Procedure: LAPAROSCOPIC ASSISTED COLECTOMY LEFT (DESCENDING);  Laparoscopic Hand Assisted Sigmoid Resection, Mobilization of Splenic Fissure, coloproctoscopy, *Latex Free Room* Anesthesia General with Pain block  ;  Surgeon: Aurora Justice MD;  Location: [facility] OR     NERVE SURGERY  8/18/11    RF ablation @ L3-S1 @ [facility]     RECONSTRUCT NOSE AND SEPTUM (FUNCTIONAL)  10/14/2011    Procedure:RECONSTRUCT NOSE AND SEPTUM (FUNCTIONAL); Functional Septorhinoplasty, Turbinate Reduction, ; Surgeon:CEDRIC CUEVAS; Location:[facility] OR     SINUS SURGERY  10/1/01    ethmoidectomy chronic sinusitis       Social History   Substance Use Topics     Smoking status: Never Smoker     Smokeless tobacco: Never Used     Alcohol use No     Family History   Problem Relation Age of Onset     Musculoskeletal Disorder Mother      back     Anxiety Disorder Mother      Colon Polyps Mother      Ulcerative Colitis Mother      and ischemic small intestine, surgery     Hypertension Mother      Breast Cancer Mother      Osteoporosis Mother      Diabetes Mother      Type 2, Diagnosed in 2014     Depression Mother      Takes Cymbalta to help with chronic pain  "+ depx     Thyroid Disease Mother      Hypothyroidism     Obesity Mother      Under much better control latter half of 2015     Musculoskeletal Disorder Father      back     Substance Abuse Father      Hypertension Father      Hyperlipidemia Father      Depression Father      Off meds for many years. Seems \"ok\"     HEART DISEASE Maternal Grandmother      HEART DISEASE Maternal Grandfather      Psychotic Disorder Paternal Grandfather      Suicide Paternal Grandfather      Depression Paternal Grandfather      Pediatrician. Committed suicide by pistol in 1990.     Musculoskeletal Disorder Brother      back     Depression Brother      Expressed as anger and moodiness     Substance Abuse Sister      Depression Sister      Mental Health Therapist, yet no anti-depressants?     Anxiety Disorder Sister      Mental Health Therapist, yet no anti-anxiety meds?     Other Cancer Other      Bladder Cancer - Fatal     Substance Abuse Brother      Colon Cancer No family hx of      Crohn Disease No family hx of      Anesthesia Reaction No family hx of      Cancer No family hx of      No family history of skin cancer         Allergies   Allergen Reactions     Banana Shortness Of Breath     Pt reports organic Banana is okay.      Nitroglycerin Palpitations     Penicillins Anaphylaxis     Provigil [Modafinil] Shortness Of Breath     headache     Ciprofloxacin Palpitations, Other (See Comments) and Rash     dizziness (versus metronidazole taken at same time)     Gadolinium Hives and Itching     Patient was premedicated for the contrast allergy. He did still have a reaction a few hours after injection. Hives and itching. Dr. Gomez told tech to inform pt he should only have contrast again in the future when premedicated and at a hospital. Not at an outpatient facility.      Dulera Other (See Comments)     Hoarse voice     Dye [Contrast Dye] Other (See Comments) and Hives     Moderate flushing, CT contrast     Levaquin Other (See " Comments)     tendonitis     Neurontin [Gabapentin] Hives     Moderate hives     Nortriptyline Hives     Varicella Virus Vaccine Live      Rash     Ciprofloxacin Palpitations     Flagyl [Metronidazole Hcl] Palpitations and Hives     Latex Rash     Metronidazole Palpitations, Other (See Comments) and Rash     dizziness (versus ciprofloxacin taken at same time)     No Clinical Screening - See Comments Rash     Nitrile gloves     Recent Labs   Lab Test  09/19/18   1512  07/03/18   0900  05/17/18   1417  05/08/18   0955  05/01/18   1558  04/19/18   1116   01/18/18   0834  05/16/17   1042   01/03/17   0825  11/01/16   1546   A1C   --    --    --    --   5.8*   --    --   5.8  5.5   --    --    --    LDL   --   Cannot estimate LDL when triglyceride exceeds 400 mg/dL  84   --   Cannot estimate LDL when triglyceride exceeds 400 mg/dL  56   --    --    --   83   --    < >  44   --    HDL   --   32*   --   27*   --    --    --   31*   --    < >  31*   --    TRIG   --   417*   --   998*   --    --    --   319*   --    < >  388*   --    ALT  58   --   48   --    --    --    --    --    --    --   27   --    CR  0.96   --    --    --    --   0.97   < >  1.10   --    < >  1.32*  1.10   GFRESTIMATED  85   --    --    --    --   84   < >  73   --    < >  59*  73   GFRESTBLACK  >90   --    --    --    --   >90   < >  88   --    < >  71  88   POTASSIUM  4.6   --    --    --    --   3.9   < >  3.9   --    < >  4.0  4.3   TSH   --    --    --    --    --    --    --   1.84   --    --    --   2.76    < > = values in this interval not displayed.      BP Readings from Last 3 Encounters:   11/06/18 140/90   10/30/18 139/87   10/29/18 125/82    Wt Readings from Last 3 Encounters:   11/06/18 (!) 335 lb (152 kg)   10/30/18 (!) 339 lb (153.8 kg)   10/25/18 (!) 337 lb 9.6 oz (153.1 kg)                    ROS:  CONSTITUTIONAL: NEGATIVE for fever, chills, change in weight  INTEGUMENTARY/SKIN: NEGATIVE for worrisome rashes, moles or  lesions  EYES: NEGATIVE for vision changes or irritation  ENT/MOUTH: NEGATIVE for ear, mouth and throat problems  RESP: NEGATIVE for significant cough or SOB  CV: NEGATIVE for chest pain, palpitations or peripheral edema  GI: NEGATIVE for nausea, abdominal pain, heartburn, or change in bowel habits  : NEGATIVE for frequency, dysuria, or hematuria  MUSCULOSKELETAL: NEGATIVE for significant arthralgias or myalgia  NEURO: NEGATIVE for weakness, dizziness or paresthesias  ENDOCRINE: NEGATIVE for temperature intolerance, skin/hair changes  HEME: NEGATIVE for bleeding problems  PSYCHIATRIC: NEGATIVE for changes in mood or affect    OBJECTIVE:     /87 (BP Location: Right arm, Patient Position: Chair, Cuff Size: Adult Large)  Pulse 81  Temp 97.8  F (36.6  C) (Oral)  Resp 18  Wt (!) 339 lb (153.8 kg)  SpO2 96%  BMI 40.2 kg/m2  Body mass index is 40.2 kg/(m^2).  GENERAL: healthy, alert and no distress  EYES: Eyes grossly normal to inspection  HENT: normal cephalic/atraumatic and oral mucous membranes moist  CV: regular rate and rhythm, normal S1 S2, no S3 or S4, no murmur, click or rub, no peripheral edema and peripheral pulses strong  MS: no gross musculoskeletal defects noted, no edema  NEURO: Normal strength and tone, mentation intact and speech normal  PSYCH: mentation appears normal, affect normal/bright    Diagnostic Test Results:  none     ASSESSMENT/PLAN:     1. Migraine with aura and without status migrainosus, not intractable  Comments: Consider triptans for rescue treatment despite history of hypertension.  - rizatriptan (MAXALT-MLT) 5 MG ODT tab; Take 1 tablet (5 mg) by mouth at onset of headache for migraine  Dispense: 30 tablet; Refill: 5    2. Chronic midline low back pain without sciatica  Comments: Most likely due to lumbar facet arthropathy. Patient is requesting refills. Continue Oxycodone as needed.  - pregabalin (LYRICA) 100 MG capsule; Take 1 capsule (100 mg) by mouth 2 times daily   Dispense: 60 capsule; Refill: 5    Follow-up with PCP.    Houston Koroma MD  [facility]

## 2018-11-05 ENCOUNTER — TELEPHONE (OUTPATIENT)
Dept: PSYCHIATRY | Facility: CLINIC | Age: 43
End: 2018-11-05

## 2018-11-05 NOTE — TELEPHONE ENCOUNTER
Osvaldo Blum MA Swanson, Melanie A RN                   Caller: Joel     Relationship to Patient: pt     Call Back Number: [phone]     Reason for Call: TMS side effects and medication adjustments.

## 2018-11-05 NOTE — TELEPHONE ENCOUNTER
-Writer returned call to patient.  He reported that he has had an increase in visual hallucinations, he started taking his old dose of risperidone at 0.5 mg a day.  This has been helpful.  -Patient also reports having intense ringing in his ears, reports that he was treated for an ear infection, but this has since been taken care of.  He still reports ringing in his ears.   -patient also reports that he finally got rid of his headache last Friday.  Reports that he was prescribed rescue medication for his headaches by his PCP.      -He would like to follow up in TRD program.  Writer had him schedule a follow up visit with Dr. Mohr.

## 2018-11-06 ENCOUNTER — OFFICE VISIT (OUTPATIENT)
Dept: URGENT CARE | Facility: URGENT CARE | Age: 43
End: 2018-11-06
Payer: COMMERCIAL

## 2018-11-06 ENCOUNTER — ALLIED HEALTH/NURSE VISIT (OUTPATIENT)
Dept: PHARMACY | Facility: CLINIC | Age: 43
End: 2018-11-06
Payer: COMMERCIAL

## 2018-11-06 VITALS
WEIGHT: 315 LBS | SYSTOLIC BLOOD PRESSURE: 140 MMHG | DIASTOLIC BLOOD PRESSURE: 90 MMHG | RESPIRATION RATE: 18 BRPM | TEMPERATURE: 97.7 F | OXYGEN SATURATION: 95 % | BODY MASS INDEX: 39.73 KG/M2 | HEART RATE: 72 BPM

## 2018-11-06 DIAGNOSIS — M45.8 ANKYLOSING SPONDYLITIS OF SACRAL REGION (H): ICD-10-CM

## 2018-11-06 DIAGNOSIS — G43.909 MIGRAINE: ICD-10-CM

## 2018-11-06 DIAGNOSIS — G43.919 INTRACTABLE MIGRAINE WITHOUT STATUS MIGRAINOSUS, UNSPECIFIED MIGRAINE TYPE: ICD-10-CM

## 2018-11-06 DIAGNOSIS — F41.8 DEPRESSION WITH ANXIETY: Primary | ICD-10-CM

## 2018-11-06 DIAGNOSIS — M51.26 LUMBAR DISC HERNIATION: Primary | ICD-10-CM

## 2018-11-06 PROCEDURE — 99607 MTMS BY PHARM ADDL 15 MIN: CPT | Mod: U4 | Performed by: PHARMACIST

## 2018-11-06 PROCEDURE — 99606 MTMS BY PHARM EST 15 MIN: CPT | Mod: U4 | Performed by: PHARMACIST

## 2018-11-06 PROCEDURE — 99213 OFFICE O/P EST LOW 20 MIN: CPT | Performed by: FAMILY MEDICINE

## 2018-11-06 ASSESSMENT — PAIN SCALES - GENERAL: PAINLEVEL: SEVERE PAIN (7)

## 2018-11-06 NOTE — PATIENT INSTRUCTIONS
Recommendations from today's MTM visit:                                                        1. No medication changes recommended today    Next MTM visit: 4-8 weeks    To schedule another MTM appointment, please call the clinic directly or you may call the MTM scheduling line at [phone] or toll-free at [phone].     My Clinical Pharmacist's contact information:                                                      It was a pleasure talking with you today!  Please feel free to contact me with any questions or concerns you have.      Radha Mcdonald, Pharm.D, [facility]  Medication Therapy Management Pharmacist      You may receive a survey about the MTM services you received.  I would appreciate your feedback to help me serve you better in the future. Please fill it out and return it when you can. Your comments will be anonymous.

## 2018-11-06 NOTE — MR AVS SNAPSHOT
After Visit Summary   11/6/2018    Joel Pineda    MRN: [id number]           Patient Information     Date Of Birth          1975        Visit Information        Provider Department      11/6/2018 5:40 PM Arpan Roberson MD [facility] [address]         Follow-ups after your visit        Your next 10 appointments already scheduled     Nov 16, 2018  3:00 PM CST   Level 1 with [facility]    [address] [place] [address] [id number] [phone]            Nov 20, 2018  2:30 PM CST   Adult Med Follow UP with Lynne Mohr MD   [facility] ([facility])    [address] [address] [phone]            Jan 30, 2019  1:20 PM CST   [facility]il Baxter MD   [facility]    [address] [facility] [address] [phone]              Who to contact     If you have questions or need follow up information about today's clinic visit or your schedule please contact [facility] directly at [phone].  Normal or non-critical lab and imaging results will be communicated to you by MyChart, letter or phone within 4 business days after the clinic has received the results. If you do not hear from us within 7 days, please contact the clinic through [employer] or phone. If you have a critical or abnormal lab result, we will notify you by phone as soon as possible.  Submit refill requests through [employer] or call your pharmacy and they will forward the refill request to us. Please allow 3 business days for your refill to be completed.          Additional Information About Your Visit        [employer] Information     [employer] gives you secure access to your electronic health record. If you see a primary care provider, you can also send messages to your care team and make appointments. If you  have questions, please call your primary care clinic.  If you do not have a primary care provider, please call [phone] and they will assist you.        Care EveryWhere ID     This is your Care EveryWhere ID. This could be used by other organizations to access your [facility] medical records  [id number]        Your Vitals Were     Pulse Temperature Respirations Pulse Oximetry BMI (Body Mass Index)       72 97.7  F (36.5  C) (Oral) 18 95% 39.73 kg/m2        Blood Pressure from Last 3 Encounters:   11/06/18 140/90   10/30/18 139/87   10/29/18 125/82    Weight from Last 3 Encounters:   11/06/18 (!) 335 lb (152 kg)   10/30/18 (!) 339 lb (153.8 kg)   10/25/18 (!) 337 lb 9.6 oz (153.1 kg)              Today, you had the following     No orders found for display         Today's Medication Changes          These changes are accurate as of 11/6/18  6:22 PM.  If you have any questions, ask your nurse or doctor.               Stop taking these medicines if you haven't already. Please contact your care team if you have questions.     INFLIXIMAB IV   Stopped by:  Radha Mcdonald, [facility]                    Primary Care Provider Office Phone # Fax #    Ksenia Shady Lyles [phone] [address] [facility] [address]        Goals        General    I will continue to attend Psychiatry appointments for medication management, 1/14/2014 (pt-stated)     Notes - Note edited  8/24/2016  4:04 PM by Dalila Cavazos LSW    As of today's date 8/24/2016 goal is met at 51 - 75%.   Goal Status:  Ongoing Sees  Therapist every other week and Psych PA every 3 weeks.  As of today's date 5/25/2016 goal is met at 51 - 75%.   Goal Status:  Showing progress-  Meeting with Psych PA for second time tomorrow  To review meds As of today's date 4/11/2016 goal is met at 51 - 75%.   Goal Status:  Showing progress  As of today's date 1/14/2014 goal is met at 51 - 75%.   Goal Status:  Active        I will  schedule therapy with new counselor, 1/14/2014 (pt-stated)     Notes - Note edited  5/25/2016  1:54 PM by Dalila Cavazos LSW    As of today's date 5/25/2016 goal is met at 76 - 100%.   Goal Status:  Ongoing New therapist and Psych PA on a regular basis  As of today's date 5/10/2016 goal is met at 76 - 100%.   Goal Status:  Ongoing met with new therapist at [facility]  As of today's date 4/11/2016 goal is met at 51 - 75%.   Goal Status:  Ongoing meets with therapist regularly  As of today's date 1/14/2014 goal is met at 0 - 25%.   Goal Status:  Active        Psychosocial I need some help with cleaning (pt-stated)     Notes - Note edited  6/23/2016  1:57 PM by Dalila Cavazos LSW    As of today's date 6/23/2016 goal is met at 26 - 50%.   Goal Status:  Showing progress met with [facility]. Has not yet made a decision  As of today's date 5/25/2016 goal is met at 0 - 25%.   Goal Status:  Ongoing agency had to reschedule appt.  As of today's date 5/10/2016 goal is met at 0 - 25%.   Goal Status:  Active referral to [facility]        Equal Access to Services     ANNA CANO AH: Hadii floyd ashley hadasho Soomaali, waaxda luqadaha, qaybta kaalmada adeegyada, isabell echevarria. So [facility] [phone].    ATENCIÓN: Si habla español, tiene a faustin disposición servicios gratuitos de asistencia lingüística. Llame al [phone].    We comply with applicable federal civil rights laws and [place] laws. We do not discriminate on the basis of race, color, national origin, age, disability, sex, sexual orientation, or gender identity.            Thank you!     Thank you for choosing [facility]  for your care. Our goal is always to provide you with excellent care. Hearing back from our patients is one way we can continue to improve our services. Please take a few minutes to complete the written survey that you may receive in the mail after your visit with us. Thank you!             Your Updated  Medication List - Protect others around you: Learn how to safely use, store and throw away your medicines at www.disposemymeds.org.          This list is accurate as of 11/6/18  6:22 PM.  Always use your most recent med list.                   Brand Name Dispense Instructions for use Diagnosis    acetaminophen 500 MG Caps     60 capsule    Take 500 mg by mouth every 4 hours as needed        ADVAIR DISKUS 250-50 MCG/DOSE diskus inhaler   Generic drug:  fluticasone-salmeterol      Inhale 1 puff into the lungs        albuterol 108 (90 Base) MCG/ACT inhaler    PROAIR HFA/PROVENTIL HFA/VENTOLIN HFA     Inhale 2 puffs into the lungs every 4 hours as needed        ALPRAZolam 1 MG 24 hr tablet    XANAX XR     Take 1 mg by mouth every morning        atorvastatin 40 MG tablet    LIPITOR    90 tablet    TAKE 1 TABLET (40 MG) BY MOUTH DAILY    Mixed hyperlipidemia       celecoxib 200 MG capsule    celeBREX    180 capsule    TAKE 1 CAPSULE BY MOUTH TWICE DAILY AS NEEDED FORMODERATE PAIN    Chronic midline low back pain without sciatica       cetirizine 10 MG tablet    zyrTEC    30 tablet    Take 1 tablet (10 mg) by mouth At Bedtime    Cough       cyanocobalamin 1000 MCG/ML injection    VITAMIN B12    10 mL    Inject 1 mL (1,000 mcg) into the muscle every 30 days    B12 deficiency       EPINEPHrine 0.3 MG/0.3ML injection 2-pack    EPIPEN/ADRENACLICK/or ANY BX GENERIC EQUIV    0.6 mL    Inject 0.3 mLs (0.3 mg) into the muscle once as needed for anaphylaxis    Food allergy       ginger root 550 MG Caps capsule      Take 550 mg by mouth Up to three times daily        hydrOXYzine 50 MG tablet    ATARAX     Take 50 mg by mouth daily        lamoTRIgine 25 MG tablet    LaMICtal     Take 200 mg by mouth daily        levothyroxine 75 MCG tablet    SYNTHROID/LEVOTHROID    90 tablet    TAKE 1 TABLET (75 MCG) BY MOUTH EVERY MORNING.    Acquired hypothyroidism       loperamide 2 MG capsule    IMODIUM     Take 2 mg by mouth 4 times daily as  needed for diarrhea        metoprolol succinate 200 MG 24 hr tablet    TOPROL-XL    180 tablet    Take 2 tablets (400 mg) by mouth daily    Essential hypertension with goal blood pressure less than 140/90       montelukast 10 MG tablet    SINGULAIR     Take 10 mg by mouth        omega-3 acid ethyl esters 1 g capsule    Lovaza    360 capsule    TAKE 2 CAPSULES BY MOUTH TWICE DAILY.    Hypertriglyceridemia       omeprazole 20 MG tablet      Take 20 mg by mouth daily        order for DME     1 Units    SI-loc belt    SI (sacroiliac) joint dysfunction       order for DME      Respironics REMSTAR 60 Series Auto CPAP 10-12 cm H2O, Wisp nasal mask w/a large cushion and a chinstrap        oxyCODONE IR 5 MG tablet    ROXICODONE    35 tablet    Take 1-2 tablets (5-10 mg) by mouth every 6 hours as needed for pain (maximum 4 tablet(s) per day)    Facet arthropathy       pregabalin 100 MG capsule    LYRICA    60 capsule    Take 1 capsule (100 mg) by mouth 2 times daily    Chronic midline low back pain without sciatica       pseudoePHEDrine 120 MG 12 hr tablet    SUDAFED    28 tablet    Take 1 tablet (120 mg) by mouth every 12 hours    Nasal congestion       ramipril 10 MG capsule    ALTACE    90 capsule    Take 1 capsule (10 mg) by mouth daily    Hypertension goal BP (blood pressure) < 140/90       risperiDONE 1 MG tablet    risperDAL     Take 1 mg by mouth daily \        rizatriptan 5 MG ODT tab    MAXALT-MLT    30 tablet    Take 1 tablet (5 mg) by mouth at onset of headache for migraine    Migraine with aura and without status migrainosus, not intractable       SIMPONI ARIA 50 MG/4ML injection   Generic drug:  golimumab           syringe (disposable) 1 ML [facility]    BD TUBERCULIN SYRINGE    12 each    Equipment being ordered: 1 ml tuberculin syringes to be used for Vitamin B12 injections.    B12 deficiency       tiZANidine 4 MG tablet    ZANAFLEX    90 tablet    TAKE 1 TABLET BY MOUTH THREE TIMES DAILY AS NEEDED    Dorsalgia        vitamin D3 38917 UNITS capsule    CHOLECALCIFEROL    24 capsule    Take one capsule every two weeks.    Vitamin D deficiency

## 2018-11-06 NOTE — PROGRESS NOTES
SUBJECTIVE/OBJECTIVE:                           Joel Pineda is a 42 year old male seen for a follow-up for Medication Therapy Management in the infusion center.  He was referred to me from Ksenia Lyles.     Chief Complaint: Follow-up from visit on 6/26/2018.    Allergies/ADRs: Reviewed in Epic  Tobacco: No tobacco use  Alcohol: none  Caffeine: patient has decreased his caffeine intake and is drinking about 15 ounces of caffeinated coffee and 15 ounces of decaf.    Ankylosing Spondylitis: Current medications include, patient is going to be starting Simponi infusion. Patient has an infusion scheduled for next week. Patient's remicade was discontinued because of side effects. Patient continues to take Celecoxib 200 mg bid and hopefully decrease use once Simponi is on board. Patient has also been taking oxycodone for this pain too and hopefully will decrease his dose once Simponi is effective.     Mood/Anxiety: current therapy includes lamictal 200mg daily, Xanax XR 1mg daily,   risperidone 1mg daily. Patient sees his therapist every other week. Patient underwent TMS at [facility] and had a negative experience. He began experiencing headaches, tinnitus and hallucinations. Patient's risperdal was increased to 1mg daily due for hallucinations and this has been helping. TMS is now on hold.  Patient denies side effects from his medications. Patient's psychiatrist at [facility] has left and patient is looking for a new psychiatrist.Patient does have an appointment with Dr. Mohr at [facility] and an appointment with Larissa Dean in [place] as a back up appointment.    Migraine:  Current therapy includes rizatriptan 5mg MLT. Patient recently started having migraines with TMS therapy and prescribed triptan by by Dr. Koroma. Patient does feel like he has an aura with his migraines. Patient has taken rizatriptan on 10/31 and this helped alleviate his symptoms. Patient had 3 migraines over the course of a week when going  through TMS.     Current labs include:  Today's Vitals: There were no vitals taken for this visit.-see OV for today  BP Readings from Last 3 Encounters:   10/30/18 139/87   10/29/18 125/82   10/26/18 130/83     Lab Results   Component Value Date    A1C 5.8 05/01/2018   .  Lab Results   Component Value Date    CHOL 178 05/08/2018     Lab Results   Component Value Date    TRIG 998 05/08/2018     Lab Results   Component Value Date    HDL 27 05/08/2018     Lab Results   Component Value Date    LDL  05/08/2018     Cannot estimate LDL when triglyceride exceeds 400 mg/dL    LDL 56 05/08/2018       Liver Function Studies -   Recent Labs   Lab Test  05/17/18   1417   PROTTOTAL  7.7   ALBUMIN  4.2   BILITOTAL  0.4   ALKPHOS  106   AST  40   ALT  48       Lab Results   Component Value Date    UCRR 99 03/12/2018    MICROL <5 01/18/2018    UMALCR Unable to calculate due to low value 01/18/2018       Last Basic Metabolic Panel:  Lab Results   Component Value Date     04/19/2018      Lab Results   Component Value Date    POTASSIUM 3.9 04/19/2018     Lab Results   Component Value Date    CHLORIDE 104 04/19/2018     Lab Results   Component Value Date    BUN 12 04/19/2018     Lab Results   Component Value Date    CR 0.97 04/19/2018     GFR Estimate   Date Value Ref Range Status   09/19/2018 85 >60 mL/min/1.7m2 Final     Comment:     Non  GFR Calc   04/19/2018 84 >60 mL/min/1.7m2 Final     Comment:     Non  GFR Calc   03/12/2018 60 (L) >60 mL/min/1.7m2 Final     Comment:     Non  GFR Calc     GFR Estimate If Black   Date Value Ref Range Status   09/19/2018 >90 >60 mL/min/1.7m2 Final     Comment:      GFR Calc   04/19/2018 >90 >60 mL/min/1.7m2 Final     Comment:      GFR Calc   03/12/2018 72 >60 mL/min/1.7m2 Final     Comment:      GFR Calc       Most Recent Immunizations   Administered Date(s) Administered     Influenza (IIV3) PF  09/09/2013     Influenza Vaccine IM 3yrs+ 4 Valent IIV4 10/11/2016     Pneumo Conj 13-V (2010&after) 10/02/2015     Pneumococcal 23 valent 10/11/2016     TD (ADULT, 7+) 10/04/2002     TDAP Vaccine (Adacel) 07/16/2010       ASSESSMENT:                             Current medications were reviewed today. Medication list updated today.    Medication Adherence: no issues identified    Ankylosing Spondylitis: Needs improvement; Patient will start Simponi infusions next week. Continue to monitor.    Mood/Anxiety: Stable.  Patient will be establishing care with new psychiatrist.    Migraine: Stable. Patient to monitor frequency of migraines now with cessation of TMS.    PLAN:                            No medication changes recommended today.    I spent 30 minutes with this patient today. All changes were made via collaborative practice agreement with Ksenia Lyles. A copy of the visit note was provided to the patient's primary care provider.    Will follow up in 4-8 weeks at follow-up infusion.    The patient was sent via [employer] a summary of these recommendations as an after visit summary.     Radha Mcdonald, Pharm.D, BCACP  Medication Therapy Management Pharmacist

## 2018-11-06 NOTE — MR AVS SNAPSHOT
After Visit Summary   11/6/2018    Joel Pineda    MRN: [id number]           Patient Information     Date Of Birth          1975        Visit Information        Provider Department      11/6/2018 2:30 PM Radha Mcdonald, [facility] MTM        Today's Diagnoses     Depression with anxiety    -  1    Ankylosing spondylitis of sacral region (H)        Migraine        Intractable migraine without status migrainosus, unspecified migraine type          Care Instructions    Recommendations from today's MTM visit:                                                        1. No medication changes recommended today    Next MTM visit: 4-8 weeks    To schedule another MTM appointment, please call the clinic directly or you may call the MTM scheduling line at [phone] or toll-free at [phone].     My Clinical Pharmacist's contact information:                                                      It was a pleasure talking with you today!  Please feel free to contact me with any questions or concerns you have.      Radha Mcdonald, Pharm.D, [facility]  Medication Therapy Management Pharmacist      You may receive a survey about the MTM services you received.  I would appreciate your feedback to help me serve you better in the future. Please fill it out and return it when you can. Your comments will be anonymous.                Follow-ups after your visit        Follow-up notes from your care team     Return in about 4 weeks (around 12/4/2018) for Medication Therapy Management Visit.      Your next 10 appointments already scheduled     Nov 16, 2018  3:00 PM CST   Level 1 with [address] ([facility])    [id number] [place] [id number] [phone]            Nov 20, 2018  2:30 PM CST   Adult Med Follow UP with Lynne Mohr MD   [facility] ([facility])    [address] [facility]  [address]  [facility] [address]   [phone]            Jan 30, 2019  1:20 PM CST   Return Visit with Oneil Baxter MD   [facility]    [address] [facility] [facility]  [place] [id number] [phone]              Who to contact     If you have questions or need follow up information about today's clinic visit or your schedule please contact [facility] directly at [phone].  Normal or non-critical lab and imaging results will be communicated to you by [employer], letter or phone within 4 business days after the clinic has received the results. If you do not hear from us within 7 days, please contact the clinic through [employer] or phone. If you have a critical or abnormal lab result, we will notify you by phone as soon as possible.  Submit refill requests through [employer] or call your pharmacy and they will forward the refill request to us. Please allow 3 business days for your refill to be completed.          Additional Information About Your Visit        [employer] Information     [employer] gives you secure access to your electronic health record. If you see a primary care provider, you can also send messages to your care team and make appointments. If you have questions, please call your primary care clinic.  If you do not have a primary care provider, please call [phone] and they will assist you.        Care EveryWhere ID     This is your Care EveryWhere ID. This could be used by other organizations to access your [facility] medical records  [id number]         Blood Pressure from Last 3 Encounters:   10/30/18 139/87   10/29/18 125/82   10/26/18 130/83    Weight from Last 3 Encounters:   10/30/18 (!) 339 lb (153.8 kg)   10/25/18 (!) 337 lb 9.6 oz (153.1 kg)   10/19/18 (!) 338 lb (153.3 kg)              Today, you had the following     No orders found for display         Today's Medication Changes          These changes are accurate as of 11/6/18  4:02 PM.   If you have any questions, ask your nurse or doctor.               Stop taking these medicines if you haven't already. Please contact your care team if you have questions.     INFLIXIMAB IV                    Primary Care Provider Office Phone # Fax #    Ksenia Shady Lyles [phone] [address] [facility] [address]        Goals        General    I will continue to attend Psychiatry appointments for medication management, 1/14/2014 (pt-stated)     Notes - Note edited  8/24/2016  4:04 PM by Dalila Cavazos LSW    As of today's date 8/24/2016 goal is met at 51 - 75%.   Goal Status:  Ongoing Sees  Therapist every other week and Psych PA every 3 weeks.  As of today's date 5/25/2016 goal is met at 51 - 75%.   Goal Status:  Showing progress-  Meeting with Psych PA for second time tomorrow  To review meds As of today's date 4/11/2016 goal is met at 51 - 75%.   Goal Status:  Showing progress  As of today's date 1/14/2014 goal is met at 51 - 75%.   Goal Status:  Active        I will schedule therapy with new counselor, 1/14/2014 (pt-stated)     Notes - Note edited  5/25/2016  1:54 PM by Dalila Cavazos LSW    As of today's date 5/25/2016 goal is met at 76 - 100%.   Goal Status:  Ongoing New therapist and Psych PA on a regular basis  As of today's date 5/10/2016 goal is met at 76 - 100%.   Goal Status:  Ongoing met with new therapist at [facility]  As of today's date 4/11/2016 goal is met at 51 - 75%.   Goal Status:  Ongoing meets with therapist regularly  As of today's date 1/14/2014 goal is met at 0 - 25%.   Goal Status:  Active        Psychosocial I need some help with cleaning (pt-stated)     Notes - Note edited  6/23/2016  1:57 PM by Dalila Cavazos LSW    As of today's date 6/23/2016 goal is met at 26 - 50%.   Goal Status:  Showing progress met with [facility]. Has not yet made a decision  As of today's date 5/25/2016 goal is met at 0 - 25%.   Goal Status:  Ongoing agency had  to reschedule appt.  As of today's date 5/10/2016 goal is met at 0 - 25%.   Goal Status:  Active referral to [facility]        Equal Access to Services     ANNA CANO : Hadii aad ku hadcorycrissy Darby, wataisha toledojakubha, ofelia ojeda, isabell lux marychuymaribel monson vonnie echevarria. So [facility] [phone].    ATENCIÓN: Si habla español, tiene a faustin disposición servicios gratuitos de asistencia lingüística. Llame al [phone].    We comply with applicable federal civil rights laws and [place] laws. We do not discriminate on the basis of race, color, national origin, age, disability, sex, sexual orientation, or gender identity.            Thank you!     Thank you for choosing [facility]  for your care. Our goal is always to provide you with excellent care. Hearing back from our patients is one way we can continue to improve our services. Please take a few minutes to complete the written survey that you may receive in the mail after your visit with us. Thank you!             Your Updated Medication List - Protect others around you: Learn how to safely use, store and throw away your medicines at www.disposemymeds.org.          This list is accurate as of 11/6/18  4:02 PM.  Always use your most recent med list.                   Brand Name Dispense Instructions for use Diagnosis    acetaminophen 500 MG Caps     60 capsule    Take 500 mg by mouth every 4 hours as needed        ADVAIR DISKUS 250-50 MCG/DOSE diskus inhaler   Generic drug:  fluticasone-salmeterol      Inhale 1 puff into the lungs        albuterol 108 (90 Base) MCG/ACT inhaler    PROAIR HFA/PROVENTIL HFA/VENTOLIN HFA     Inhale 2 puffs into the lungs every 4 hours as needed        ALPRAZolam 1 MG 24 hr tablet    XANAX XR     Take 1 mg by mouth every morning        atorvastatin 40 MG tablet    LIPITOR    90 tablet    TAKE 1 TABLET (40 MG) BY MOUTH DAILY    Mixed hyperlipidemia       celecoxib 200 MG capsule    celeBREX    180 capsule     TAKE 1 CAPSULE BY MOUTH TWICE DAILY AS NEEDED FORMODERATE PAIN    Chronic midline low back pain without sciatica       cetirizine 10 MG tablet    zyrTEC    30 tablet    Take 1 tablet (10 mg) by mouth At Bedtime    Cough       cyanocobalamin 1000 MCG/ML injection    VITAMIN B12    10 mL    Inject 1 mL (1,000 mcg) into the muscle every 30 days    B12 deficiency       EPINEPHrine 0.3 MG/0.3ML injection 2-pack    EPIPEN/ADRENACLICK/or ANY BX GENERIC EQUIV    0.6 mL    Inject 0.3 mLs (0.3 mg) into the muscle once as needed for anaphylaxis    Food allergy       ginger root 550 MG Caps capsule      Take 550 mg by mouth Up to three times daily        hydrOXYzine 50 MG tablet    ATARAX     Take 50 mg by mouth daily        lamoTRIgine 25 MG tablet    LaMICtal     Take 200 mg by mouth daily        levothyroxine 75 MCG tablet    SYNTHROID/LEVOTHROID    90 tablet    TAKE 1 TABLET (75 MCG) BY MOUTH EVERY MORNING.    Acquired hypothyroidism       loperamide 2 MG capsule    IMODIUM     Take 2 mg by mouth 4 times daily as needed for diarrhea        metoprolol succinate 200 MG 24 hr tablet    TOPROL-XL    180 tablet    Take 2 tablets (400 mg) by mouth daily    Essential hypertension with goal blood pressure less than 140/90       montelukast 10 MG tablet    SINGULAIR     Take 10 mg by mouth        omega-3 acid ethyl esters 1 g capsule    Lovaza    360 capsule    TAKE 2 CAPSULES BY MOUTH TWICE DAILY.    Hypertriglyceridemia       omeprazole 20 MG tablet      Take 20 mg by mouth daily        order for DME     1 Units    SI-loc belt    SI (sacroiliac) joint dysfunction       order for DME      Respironics REMSTAR 60 Series Auto CPAP 10-12 cm H2O, Wisp nasal mask w/a large cushion and a chinstrap        oxyCODONE IR 5 MG tablet    ROXICODONE    35 tablet    Take 1-2 tablets (5-10 mg) by mouth every 6 hours as needed for pain (maximum 4 tablet(s) per day)    Facet arthropathy       pregabalin 100 MG capsule    LYRICA    60 capsule     Take 1 capsule (100 mg) by mouth 2 times daily    Chronic midline low back pain without sciatica       pseudoePHEDrine 120 MG 12 hr tablet    SUDAFED    28 tablet    Take 1 tablet (120 mg) by mouth every 12 hours    Nasal congestion       ramipril 10 MG capsule    ALTACE    90 capsule    Take 1 capsule (10 mg) by mouth daily    Hypertension goal BP (blood pressure) < 140/90       risperiDONE 1 MG tablet    risperDAL     Take 1 mg by mouth daily \        rizatriptan 5 MG ODT tab    MAXALT-MLT    30 tablet    Take 1 tablet (5 mg) by mouth at onset of headache for migraine    Migraine with aura and without status migrainosus, not intractable       SIMPONI ARIA 50 MG/4ML injection   Generic drug:  golimumab           syringe (disposable) 1 ML Misc    BD TUBERCULIN SYRINGE    12 each    Equipment being ordered: 1 ml tuberculin syringes to be used for Vitamin B12 injections.    B12 deficiency       tiZANidine 4 MG tablet    ZANAFLEX    90 tablet    TAKE 1 TABLET BY MOUTH THREE TIMES DAILY AS NEEDED    Dorsalgia       vitamin D3 33660 UNITS capsule    CHOLECALCIFEROL    24 capsule    Take one capsule every two weeks.    Vitamin D deficiency

## 2018-11-07 NOTE — PROGRESS NOTES
"SUBJECTIVE:   Joel Pineda is a 43 year old male presenting with a chief complaint of   Chief Complaint   Patient presents with     Musculoskeletal Problem     Patient complains of both leg and foot pain - couple of weeks       He is an established patient of [facility].    MS Injury/Pain    Onset of symptoms was great toe burning bilaterally also involves 2nd and 3rd toes.   Location: great toe Numbness started just tonight bilateral and symmetrical in location   Chronic hx of low back pain with history of Ankylosis spondylitis and has been seeing rheumatology for this for years.   History of lumbar disc \" laminectomy\" 2007  Context:       Mechanism: none       Patient experienced burning great toes. \"symmetrical\"  Course of symptoms is same.    Severity moderate  Current and Associated symptoms: denies associate weakness or other involvement of other extremities.   Aggravating Factors: none    Muscle relaxant (xanaflex) taking every other day.     12/07/16    Impression: No significant change since 4/14/2015. L5-S1 disc bulge  with superimposed left subarticular disc extrusion abuts and possibly  impinges the descending left S1 nerve roots.  Moderate to advanced  right and moderate left neural foraminal stenosis at L5-S1. No  significant central canal stenosis.  Review of Systems    Past Medical History:   Diagnosis Date     Acne      Chest pain     Chest pain, regulated w/BP meds. Clear arteries.     Skin exam, screening for cancer 12/3/2013     Family History   Problem Relation Age of Onset     Musculoskeletal Disorder Mother      back     Anxiety Disorder Mother      Colon Polyps Mother      Ulcerative Colitis Mother      and ischemic small intestine, surgery     Hypertension Mother      Breast Cancer Mother      Osteoporosis Mother      Diabetes Mother      Type 2, Diagnosed in 2014     Depression Mother      Takes Cymbalta to help with chronic pain + depx     Thyroid Disease Mother      Hypothyroidism     " "Obesity Mother      Under much better control latter half of 2015     Musculoskeletal Disorder Father      back     Substance Abuse Father      Hypertension Father      Hyperlipidemia Father      Depression Father      Off meds for many years. Seems \"ok\"     HEART DISEASE Maternal Grandmother      HEART DISEASE Maternal Grandfather      Psychotic Disorder Paternal Grandfather      Suicide Paternal Grandfather      Depression Paternal Grandfather      Pediatrician. Committed suicide by pistol in 1990.     Musculoskeletal Disorder Brother      back     Depression Brother      Expressed as anger and moodiness     Substance Abuse Sister      Depression Sister      Mental Health Therapist, yet no anti-depressants?     Anxiety Disorder Sister      Mental Health Therapist, yet no anti-anxiety meds?     Other Cancer Other      Bladder Cancer - Fatal     Substance Abuse Brother      Colon Cancer No family hx of      Crohn Disease No family hx of      Anesthesia Reaction No family hx of      Cancer No family hx of      No family history of skin cancer     Current Outpatient Prescriptions   Medication Sig Dispense Refill     acetaminophen 500 MG CAPS Take 500 mg by mouth every 4 hours as needed 60 capsule      albuterol (PROAIR HFA/PROVENTIL HFA/VENTOLIN HFA) 108 (90 BASE) MCG/ACT Inhaler Inhale 2 puffs into the lungs every 4 hours as needed       ALPRAZolam (XANAX XR) 1 MG 24 hr tablet Take 1 mg by mouth every morning       atorvastatin (LIPITOR) 40 MG tablet TAKE 1 TABLET (40 MG) BY MOUTH DAILY 90 tablet 1     celecoxib (CELEBREX) 200 MG capsule TAKE 1 CAPSULE BY MOUTH TWICE DAILY AS NEEDED FORMODERATE PAIN 180 capsule 1     cetirizine (ZYRTEC) 10 MG tablet Take 1 tablet (10 mg) by mouth At Bedtime 30 tablet 11     cyanocobalamin (VITAMIN B12) 1000 MCG/ML injection Inject 1 mL (1,000 mcg) into the muscle every 30 days 10 mL 0     EPINEPHrine (EPIPEN/ADRENACLICK/OR ANY BX GENERIC EQUIV) 0.3 MG/0.3ML injection 2-pack Inject " 0.3 mLs (0.3 mg) into the muscle once as needed for anaphylaxis 0.6 mL 3     fluticasone-salmeterol (ADVAIR DISKUS) 250-50 MCG/DOSE diskus inhaler Inhale 1 puff into the lungs       Ginger, Zingiber officinalis, (GINGER ROOT) 550 MG CAPS capsule Take 550 mg by mouth Up to three times daily       golimumab (SIMPONI ARIA) 50 MG/4ML injection        hydrOXYzine (ATARAX) 50 MG tablet Take 50 mg by mouth daily       lamoTRIgine (LAMICTAL) 25 MG tablet Take 200 mg by mouth daily        levothyroxine (SYNTHROID/LEVOTHROID) 75 MCG tablet TAKE 1 TABLET (75 MCG) BY MOUTH EVERY MORNING. 90 tablet 0     loperamide (IMODIUM) 2 MG capsule Take 2 mg by mouth 4 times daily as needed for diarrhea       metoprolol succinate (TOPROL-XL) 200 MG 24 hr tablet Take 2 tablets (400 mg) by mouth daily 180 tablet 0     montelukast (SINGULAIR) 10 MG tablet Take 10 mg by mouth       omega-3 acid ethyl esters (LOVAZA) 1 g capsule TAKE 2 CAPSULES BY MOUTH TWICE DAILY. 360 capsule 1     omeprazole 20 MG tablet Take 20 mg by mouth daily        order for [facility] Respironics REMSTAR 60 Series Auto CPAP 10-12 cm H2O, Wisp nasal mask w/a large cushion and a chinstrap       order for [facility] SI-loc belt 1 Units 0     oxyCODONE IR (ROXICODONE) 5 MG tablet Take 1-2 tablets (5-10 mg) by mouth every 6 hours as needed for pain (maximum 4 tablet(s) per day) 35 tablet 0     pregabalin (LYRICA) 100 MG capsule Take 1 capsule (100 mg) by mouth 2 times daily 60 capsule 5     pseudoePHEDrine (SUDAFED) 120 MG 12 hr tablet Take 1 tablet (120 mg) by mouth every 12 hours 28 tablet 0     ramipril (ALTACE) 10 MG capsule Take 1 capsule (10 mg) by mouth daily 90 capsule 1     risperiDONE (RISPERDAL) 1 MG tablet Take 1 mg by mouth daily \       rizatriptan (MAXALT-MLT) 5 MG ODT tab Take 1 tablet (5 mg) by mouth at onset of headache for migraine 30 tablet 5     syringe, disposable, (BD TUBERCULIN SYRINGE) 1 ML MISC Equipment being ordered: 1 ml tuberculin syringes to be used for  Vitamin B12 injections. 12 each 1     tiZANidine (ZANAFLEX) 4 MG tablet TAKE 1 TABLET BY MOUTH THREE TIMES DAILY AS NEEDED 90 tablet 4     vitamin D3 (CHOLECALCIFEROL) 96220 UNITS capsule Take one capsule every two weeks. 24 capsule 1     Social History   Substance Use Topics     Smoking status: Never Smoker     Smokeless tobacco: Never Used     Alcohol use No       OBJECTIVE  /90 (BP Location: Left arm, Patient Position: Chair, Cuff Size: Adult Large)  Pulse 72  Temp 97.7  F (36.5  C) (Oral)  Resp 18  Wt (!) 335 lb (152 kg)  SpO2 95%  BMI 39.73 kg/m2    Physical Exam   Constitutional: He appears well-developed and well-nourished. No distress.   HENT:   Head: Normocephalic and atraumatic.   Right Ear: Tympanic membrane and external ear normal.   Left Ear: Tympanic membrane and external ear normal.   Mouth/Throat: Oropharynx is clear and moist.   Eyes: EOM are normal. Pupils are equal, round, and reactive to light.   Neck: Normal range of motion. Neck supple.   Cardiovascular: Normal rate, regular rhythm and normal heart sounds.    Pulmonary/Chest: Effort normal and breath sounds normal. No respiratory distress.   Lymphadenopathy:     He has no cervical adenopathy.   Neurological: He is alert. He has normal strength. Abnormal reflex: noted patellar reflex and achilles reflex diminished.  A sensory deficit (great toe numbness noted with light touch. ) is present. No cranial nerve deficit. He displays a negative Romberg sign.   Reflex Scores:       Patellar reflexes are 1+ on the right side and 1+ on the left side.       Achilles reflexes are 1+ on the right side and 1+ on the left side.  Noted heel and toe (dorsiflexion plantar flexion) gait normal.    Skin: Skin is warm and dry. He is not diaphoretic.   Psychiatric: He has a normal mood and affect.   Nursing note and vitals reviewed.      ASSESSMENT:    ICD-10-CM    1. Lumbar disc herniation M51.26     --chronic in nature leading to radicular symptoms  such as numbness.   Apparently his lyrica has been increased over the past week. Continue per rheumatology recommendations.     Referred red flags for low back pain including cauda equina symptoms that would warrant emergency follow-up of which he denies.   Exam reassuring.   Patient educational/instructional material provided including reasons for follow-up    The patient indicates understanding of these issues and agrees with the plan.  Arpan Roberson MD

## 2018-11-09 ENCOUNTER — TELEPHONE (OUTPATIENT)
Dept: FAMILY MEDICINE | Facility: CLINIC | Age: 43
End: 2018-11-09
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
## 2018-11-10 ENCOUNTER — OFFICE VISIT (OUTPATIENT)
Dept: URGENT CARE | Facility: URGENT CARE | Age: 43
End: 2018-11-10
Payer: COMMERCIAL

## 2018-11-10 VITALS
HEART RATE: 88 BPM | SYSTOLIC BLOOD PRESSURE: 143 MMHG | WEIGHT: 315 LBS | OXYGEN SATURATION: 97 % | RESPIRATION RATE: 14 BRPM | BODY MASS INDEX: 39.84 KG/M2 | DIASTOLIC BLOOD PRESSURE: 98 MMHG | TEMPERATURE: 97.7 F

## 2018-11-10 DIAGNOSIS — B35.6 TINEA CRURIS: Primary | ICD-10-CM

## 2018-11-10 PROCEDURE — 99214 OFFICE O/P EST MOD 30 MIN: CPT | Performed by: PHYSICIAN ASSISTANT

## 2018-11-10 RX ORDER — FLUCONAZOLE 150 MG/1
TABLET ORAL
Qty: 2 TABLET | Refills: 0 | Status: SHIPPED | OUTPATIENT
Start: 2018-11-10 | End: 2018-12-05

## 2018-11-10 RX ORDER — KETOCONAZOLE 20 MG/G
CREAM TOPICAL 2 TIMES DAILY
Qty: 60 G | Refills: 1 | Status: SHIPPED | OUTPATIENT
Start: 2018-11-10 | End: 2019-01-28

## 2018-11-10 ASSESSMENT — PAIN SCALES - GENERAL: PAINLEVEL: MODERATE PAIN (5)

## 2018-11-10 NOTE — MR AVS SNAPSHOT
After Visit Summary   11/10/2018    Joel Pineda    MRN: [id number]           Patient Information     Date Of Birth          1975        Visit Information        Provider Department      11/10/2018 12:30 PM Joel Nelson PA-C [facility]        Today's Diagnoses     Tinea cruris    -  1       Follow-ups after your visit        Your next 10 appointments already scheduled     Nov 16, 2018  3:00 PM CST   Level 1 with Darby [facility] ([facility])    [address] [place] [id number] [phone]            Nov 20, 2018  2:30 PM CST   Adult Med Follow UP with Lynne Mohr MD   [facility] ([facility])    [address] [address] [phone]            Jan 30, 2019  1:20 PM CST   Return Visit with Oneil Baxter MD   [facility]    [address] [facility] [address] [phone]              Who to contact     If you have questions or need follow up information about today's clinic visit or your schedule please contact [facility] directly at [phone].  Normal or non-critical lab and imaging results will be communicated to you by MyChart, letter or phone within 4 business days after the clinic has received the results. If you do not hear from us within 7 days, please contact the clinic through MyChart or phone. If you have a critical or abnormal lab result, we will notify you by phone as soon as possible.  Submit refill requests through [employer] or call your pharmacy and they will forward the refill request to us. Please allow 3 business days for your refill to be completed.          Additional Information About Your Visit        [employer] Information     [employer] gives you secure access to your electronic health record. If you see a primary care provider, you can also send messages to  your care team and make appointments. If you have questions, please call your primary care clinic.  If you do not have a primary care provider, please call [phone] and they will assist you.        Care EveryWhere ID     This is your Care EveryWhere ID. This could be used by other organizations to access your [facility] medical records  [id number]        Your Vitals Were     Pulse Temperature Respirations Pulse Oximetry BMI (Body Mass Index)       88 97.7  F (36.5  C) (Oral) 14 97% 39.84 kg/m2        Blood Pressure from Last 3 Encounters:   11/10/18 (!) 143/98   11/06/18 140/90   10/30/18 139/87    Weight from Last 3 Encounters:   11/10/18 (!) 336 lb (152.4 kg)   11/06/18 (!) 335 lb (152 kg)   10/30/18 (!) 339 lb (153.8 kg)              Today, you had the following     No orders found for display         Today's Medication Changes          These changes are accurate as of 11/10/18  2:27 PM.  If you have any questions, ask your nurse or doctor.               Start taking these medicines.        Dose/Directions    fluconazole 150 MG tablet   Commonly known as:  DIFLUCAN   Used for:  Tinea cruris   Started by:  Joel Nelson PA-C        Take one as needed repeat in 4 days   Quantity:  2 tablet   Refills:  0       ketoconazole 2 % cream   Commonly known as:  NIZORAL   Used for:  Tinea cruris   Started by:  Joel Nelson PA-C        Apply topically 2 times daily   Quantity:  60 g   Refills:  1            Where to get your medicines      These medications were sent to [facility] - [place] - [address] [place] [facility] [address] [place] [address]     Phone:  [phone]     fluconazole 150 MG tablet    ketoconazole 2 % cream                Primary Care Provider Office Phone # Fax #    Ksenia Lyles [phone] [address] [facility] [address]        Equal Access to Services     ANNA CANO AH: Hadii aad ku  moe Darby, waalexanderda luqadaha, qaybta kaalmada lynette, isabell wisemani wale. So [facility] [phone].    ATENCIÓN: Si pachecola eulalia, tiene a faustin disposición servicios gratuitos de asistencia lingüística. Madelyn al [phone].    We comply with applicable federal civil rights laws and [place] laws. We do not discriminate on the basis of race, color, national origin, age, disability, sex, sexual orientation, or gender identity.            Thank you!     Thank you for choosing [facility]  for your care. Our goal is always to provide you with excellent care. Hearing back from our patients is one way we can continue to improve our services. Please take a few minutes to complete the written survey that you may receive in the mail after your visit with us. Thank you!             Your Updated Medication List - Protect others around you: Learn how to safely use, store and throw away your medicines at www.disposemymeds.org.          This list is accurate as of 11/10/18  2:27 PM.  Always use your most recent med list.                   Brand Name Dispense Instructions for use Diagnosis    acetaminophen 500 MG Caps     60 capsule    Take 500 mg by mouth every 4 hours as needed        ADVAIR DISKUS 250-50 MCG/DOSE diskus inhaler   Generic drug:  fluticasone-salmeterol      Inhale 1 puff into the lungs        albuterol 108 (90 Base) MCG/ACT inhaler    PROAIR HFA/PROVENTIL HFA/VENTOLIN HFA     Inhale 2 puffs into the lungs every 4 hours as needed        ALPRAZolam 1 MG 24 hr tablet    XANAX XR     Take 1 mg by mouth every morning        atorvastatin 40 MG tablet    LIPITOR    90 tablet    TAKE 1 TABLET (40 MG) BY MOUTH DAILY    Mixed hyperlipidemia       celecoxib 200 MG capsule    celeBREX    180 capsule    TAKE 1 CAPSULE BY MOUTH TWICE DAILY AS NEEDED FORMODERATE PAIN    Chronic midline low back pain without sciatica       cetirizine 10 MG tablet    zyrTEC    30 tablet    Take 1  tablet (10 mg) by mouth At Bedtime    Cough       cyanocobalamin 1000 MCG/ML injection    VITAMIN B12    10 mL    Inject 1 mL (1,000 mcg) into the muscle every 30 days    B12 deficiency       EPINEPHrine 0.3 MG/0.3ML injection 2-pack    EPIPEN/ADRENACLICK/or ANY BX GENERIC EQUIV    0.6 mL    Inject 0.3 mLs (0.3 mg) into the muscle once as needed for anaphylaxis    Food allergy       fluconazole 150 MG tablet    DIFLUCAN    2 tablet    Take one as needed repeat in 4 days    Tinea cruris       ginger root 550 MG Caps capsule      Take 550 mg by mouth Up to three times daily        hydrOXYzine 50 MG tablet    ATARAX     Take 50 mg by mouth daily        ketoconazole 2 % cream    NIZORAL    60 g    Apply topically 2 times daily    Tinea cruris       lamoTRIgine 25 MG tablet    LaMICtal     Take 200 mg by mouth daily        levothyroxine 75 MCG tablet    SYNTHROID/LEVOTHROID    90 tablet    TAKE 1 TABLET (75 MCG) BY MOUTH EVERY MORNING.    Acquired hypothyroidism       loperamide 2 MG capsule    IMODIUM     Take 2 mg by mouth 4 times daily as needed for diarrhea        metoprolol succinate 200 MG 24 hr tablet    TOPROL-XL    180 tablet    Take 2 tablets (400 mg) by mouth daily    Essential hypertension with goal blood pressure less than 140/90       montelukast 10 MG tablet    SINGULAIR     Take 10 mg by mouth        omega-3 acid ethyl esters 1 g capsule    Lovaza    360 capsule    TAKE 2 CAPSULES BY MOUTH TWICE DAILY.    Hypertriglyceridemia       omeprazole 20 MG tablet      Take 20 mg by mouth daily        order for DME     1 Units    SI-loc belt    SI (sacroiliac) joint dysfunction       order for DME      Respironics REMSTAR 60 Series Auto CPAP 10-12 cm H2O, Wisp nasal mask w/a large cushion and a chinstrap        oxyCODONE IR 5 MG tablet    ROXICODONE    35 tablet    Take 1-2 tablets (5-10 mg) by mouth every 6 hours as needed for pain (maximum 4 tablet(s) per day)    Facet arthropathy       pregabalin 100 MG capsule     LYRICA    60 capsule    Take 1 capsule (100 mg) by mouth 2 times daily    Chronic midline low back pain without sciatica       pseudoePHEDrine 120 MG 12 hr tablet    SUDAFED    28 tablet    Take 1 tablet (120 mg) by mouth every 12 hours    Nasal congestion       ramipril 10 MG capsule    ALTACE    90 capsule    Take 1 capsule (10 mg) by mouth daily    Hypertension goal BP (blood pressure) < 140/90       risperiDONE 1 MG tablet    risperDAL     Take 1 mg by mouth daily \        rizatriptan 5 MG ODT tab    MAXALT-MLT    30 tablet    Take 1 tablet (5 mg) by mouth at onset of headache for migraine    Migraine with aura and without status migrainosus, not intractable       SIMPONI ARIA 50 MG/4ML injection   Generic drug:  golimumab           syringe (disposable) 1 ML Misc    BD TUBERCULIN SYRINGE    12 each    Equipment being ordered: 1 ml tuberculin syringes to be used for Vitamin B12 injections.    B12 deficiency       tiZANidine 4 MG tablet    ZANAFLEX    90 tablet    TAKE 1 TABLET BY MOUTH THREE TIMES DAILY AS NEEDED    Dorsalgia       vitamin D3 58973 UNITS capsule    CHOLECALCIFEROL    24 capsule    Take one capsule every two weeks.    Vitamin D deficiency
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
## 2018-11-13 ENCOUNTER — OFFICE VISIT (OUTPATIENT)
Dept: FAMILY MEDICINE | Facility: CLINIC | Age: 43
End: 2018-11-13
Payer: COMMERCIAL

## 2018-11-13 VITALS
SYSTOLIC BLOOD PRESSURE: 140 MMHG | BODY MASS INDEX: 40.2 KG/M2 | OXYGEN SATURATION: 98 % | TEMPERATURE: 98.4 F | DIASTOLIC BLOOD PRESSURE: 94 MMHG | WEIGHT: 315 LBS | HEART RATE: 76 BPM

## 2018-11-13 DIAGNOSIS — M54.42 ACUTE MIDLINE LOW BACK PAIN WITH LEFT-SIDED SCIATICA: ICD-10-CM

## 2018-11-13 DIAGNOSIS — M54.6 ACUTE BILATERAL THORACIC BACK PAIN: Primary | ICD-10-CM

## 2018-11-13 DIAGNOSIS — M45.8 ANKYLOSING SPONDYLITIS OF SACRAL REGION (H): ICD-10-CM

## 2018-11-13 DIAGNOSIS — M51.369 DDD (DEGENERATIVE DISC DISEASE), LUMBAR: ICD-10-CM

## 2018-11-13 PROCEDURE — 99214 OFFICE O/P EST MOD 30 MIN: CPT | Performed by: FAMILY MEDICINE

## 2018-11-13 RX ORDER — OXYCODONE HYDROCHLORIDE 5 MG/1
5-10 TABLET ORAL EVERY 6 HOURS PRN
Qty: 35 TABLET | Refills: 0 | Status: SHIPPED | OUTPATIENT
Start: 2018-11-13 | End: 2019-01-11

## 2018-11-13 RX ORDER — METHYLPREDNISOLONE 4 MG
TABLET, DOSE PACK ORAL
Qty: 21 TABLET | Refills: 0 | Status: SHIPPED | OUTPATIENT
Start: 2018-11-13 | End: 2018-12-05

## 2018-11-13 RX ORDER — TRAMADOL HYDROCHLORIDE 50 MG/1
50 TABLET ORAL EVERY 6 HOURS PRN
Qty: 30 TABLET | Refills: 0 | Status: SHIPPED | OUTPATIENT
Start: 2018-11-13 | End: 2018-11-19

## 2018-11-13 ASSESSMENT — PAIN SCALES - GENERAL: PAINLEVEL: MODERATE PAIN (5)
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
## 2018-11-13 NOTE — MR AVS SNAPSHOT
After Visit Summary   11/13/2018    Joel Pineda    MRN: [id number]           Patient Information     Date Of Birth          1975        Visit Information        Provider Department      11/13/2018 1:40 PM Ksenia Lyles MD [facility]        Today's Diagnoses     Acute bilateral thoracic back pain    -  1    Acute midline low back pain with left-sided sciatica        DDD (degenerative disc disease), lumbar        Ankylosing spondylitis of sacral region (H)           Follow-ups after your visit        Additional Services     ORTHO  REFERRAL       [facility] is referring you to the Orthopedic  Services at [facility].       The  Representative will assist you in the coordination of your Orthopedic and Musculoskeletal Care as prescribed by your physician.    The  Representative will call you within 1 business day to help schedule your appointment, or you may contact the  Representative at:    All areas ~ [phone]     Type of Referral : Spine: Lumbar: Medical Spine/Non-Surgical Specialist        Timeframe requested: Within 2 weeks    Coverage of these services is subject to the terms and limitations of your health insurance plan.  Please call member services at your health plan with any benefit or coverage questions.      If X-rays, CT or MRI's have been performed, please contact the facility where they were done to arrange for , prior to your scheduled appointment.  Please bring this referral request to your appointment and present it to your specialist.                  Follow-up notes from your care team     Return in about 3 weeks (around 12/4/2018) for If not improving as expected.      Your next 10 appointments already scheduled     Nov 16, 2018  3:00 PM CST   Level 1 with [address] ([facility])    [address] [place] [address]  N  [facility] [address]   [phone]            Nov 20, 2018  2:30 PM CST   Adult Med Follow UP with Lynne Mohr MD   [facility] ([facility])    [address]  [facility] [address]   [phone]            Dec 14, 2018  1:00 PM CST   Level 1 with [address] ([facility])    [address] [place] [address] N  [facility] [address] [phone]            Jan 30, 2019  1:20 PM CST   Return Visit with Oneil Baxter MD   [facility] ([facility])    [address] [facility] [address] [phone]              Who to contact     If you have questions or need follow up information about today's clinic visit or your schedule please contact [facility] directly at [phone].  Normal or non-critical lab and imaging results will be communicated to you by WHMSOFThart, letter or phone within 4 business days after the clinic has received the results. If you do not hear from us within 7 days, please contact the clinic through WHMSOFThart or phone. If you have a critical or abnormal lab result, we will notify you by phone as soon as possible.  Submit refill requests through [employer] or call your pharmacy and they will forward the refill request to us. Please allow 3 business days for your refill to be completed.          Additional Information About Your Visit        WHMSOFTharSwagbucks Information     [employer] gives you secure access to your electronic health record. If you see a primary care provider, you can also send messages to your care team and make appointments. If you have questions, please call your primary care clinic.  If you do not have a primary care provider, please call [phone] and they will assist you.        Care EveryWhere ID     This is your Care EveryWhere ID. This could be used by other organizations to access your [place] medical records  [id number]        Your  Vitals Were     Pulse Temperature Pulse Oximetry BMI (Body Mass Index)          76 98.4  F (36.9  C) (Oral) 98% 40.2 kg/m2         Blood Pressure from Last 3 Encounters:   11/13/18 (!) 140/94   11/10/18 (!) 143/98   11/06/18 140/90    Weight from Last 3 Encounters:   11/13/18 (!) 153.8 kg (339 lb)   11/10/18 (!) 152.4 kg (336 lb)   11/06/18 (!) 152 kg (335 lb)              We Performed the Following     ORTHO  REFERRAL          Today's Medication Changes          These changes are accurate as of 11/13/18  2:12 PM.  If you have any questions, ask your nurse or doctor.               Start taking these medicines.        Dose/Directions    methylPREDNISolone 4 MG tablet   Commonly known as:  MEDROL DOSEPAK   Used for:  Acute bilateral thoracic back pain, Acute midline low back pain with left-sided sciatica   Started by:  Ksenia Lyles MD        Follow package instructions   Quantity:  21 tablet   Refills:  0       traMADol 50 MG tablet   Commonly known as:  ULTRAM   Used for:  Acute bilateral thoracic back pain, Acute midline low back pain with left-sided sciatica   Started by:  Ksenia Lyles MD        Dose:  50 mg   Take 1 tablet (50 mg) by mouth every 6 hours as needed for moderate to severe pain   Quantity:  30 tablet   Refills:  0            Where to get your medicines      These medications were sent to [id number] - [place], [place] - [address]  [address], [facility] [address]     Phone:  [phone]     methylPREDNISolone 4 MG tablet         Some of these will need a paper prescription and others can be bought over the counter.  Ask your nurse if you have questions.     Bring a paper prescription for each of these medications     oxyCODONE IR 5 MG tablet    traMADol 50 MG tablet               Information about OPIOIDS     PRESCRIPTION OPIOIDS: WHAT YOU NEED TO KNOW   We gave you an opioid (narcotic) pain medicine. It is important to manage your  pain, but opioids are not always the best choice. You should first try all the other options your care team gave you. Take this medicine for as short a time (and as few doses) as possible.    Some activities can increase your pain, such as bandage changes or therapy sessions. It may help to take your pain medicine 30 to 60 minutes before these activities. Reduce your stress by getting enough sleep, working on hobbies you enjoy and practicing relaxation or meditation. Talk to your care team about ways to manage your pain beyond prescription opioids.    These medicines have risks:    DO NOT drive when on new or higher doses of pain medicine. These medicines can affect your alertness and reaction times, and you could be arrested for driving under the influence (DUI). If you need to use opioids long-term, talk to your care team about driving.    DO NOT operate heavy machinery    DO NOT do any other dangerous activities while taking these medicines.    DO NOT drink any alcohol while taking these medicines.     If the opioid prescribed includes acetaminophen, DO NOT take with any other medicines that contain acetaminophen. Read all labels carefully. Look for the word  acetaminophen  or  Tylenol.  Ask your pharmacist if you have questions or are unsure.    You can get addicted to pain medicines, especially if you have a history of addiction (chemical, alcohol or substance dependence). Talk to your care team about ways to reduce this risk.    All opioids tend to cause constipation. Drink plenty of water and eat foods that have a lot of fiber, such as fruits, vegetables, prune juice, apple juice and high-fiber cereal. Take a laxative (Miralax, milk of magnesia, Colace, Senna) if you don t move your bowels at least every other day. Other side effects include upset stomach, sleepiness, dizziness, throwing up, tolerance (needing more of the medicine to have the same effect), physical dependence and slowed breathing.    Store  your pills in a secure place, locked if possible. We will not replace any lost or stolen medicine. If you don t finish your medicine, please throw away (dispose) as directed by your pharmacist. The [place] Pollution Control Agency has more information about safe disposal: https://www.pca.[facility].[place].us/living-green/managing-unwanted-medications         Primary Care Provider Office Phone # Fax #    Ksenia Shady Lyles [phone] [address] [facility] [address]        Equal Access to Services     [facility]: Hadii aad ku hadasho Soomaali, waaxda luqadaha, qaybta kaalmada adeegyada, waxtammi shankar . So [facility] [phone].    ATENCIÓN: Si habla español, tiene a faustin disposición servicios gratuitos de asistencia lingüística. [facility] [phone].    We comply with applicable federal civil rights laws and [place] laws. We do not discriminate on the basis of race, color, national origin, age, disability, sex, sexual orientation, or gender identity.            Thank you!     Thank you for choosing [facility]  for your care. Our goal is always to provide you with excellent care. Hearing back from our patients is one way we can continue to improve our services. Please take a few minutes to complete the written survey that you may receive in the mail after your visit with us. Thank you!             Your Updated Medication List - Protect others around you: Learn how to safely use, store and throw away your medicines at www.disposemymeds.org.          This list is accurate as of 11/13/18  2:12 PM.  Always use your most recent med list.                   Brand Name Dispense Instructions for use Diagnosis    acetaminophen 500 MG Caps     60 capsule    Take 500 mg by mouth every 4 hours as needed        ADVAIR DISKUS 250-50 MCG/DOSE diskus inhaler   Generic drug:  fluticasone-salmeterol      Inhale 1 puff into the lungs        albuterol 108 (90 Base)  MCG/ACT inhaler    PROAIR HFA/PROVENTIL HFA/VENTOLIN HFA     Inhale 2 puffs into the lungs every 4 hours as needed        ALPRAZolam 1 MG 24 hr tablet    XANAX XR     Take 1 mg by mouth every morning        atorvastatin 40 MG tablet    LIPITOR    90 tablet    TAKE 1 TABLET (40 MG) BY MOUTH DAILY    Mixed hyperlipidemia       celecoxib 200 MG capsule    celeBREX    180 capsule    TAKE 1 CAPSULE BY MOUTH TWICE DAILY AS NEEDED FORMODERATE PAIN    Chronic midline low back pain without sciatica       cetirizine 10 MG tablet    zyrTEC    30 tablet    Take 1 tablet (10 mg) by mouth At Bedtime    Cough       cyanocobalamin 1000 MCG/ML injection    VITAMIN B12    10 mL    Inject 1 mL (1,000 mcg) into the muscle every 30 days    B12 deficiency       EPINEPHrine 0.3 MG/0.3ML injection 2-pack    EPIPEN/ADRENACLICK/or ANY BX GENERIC EQUIV    0.6 mL    Inject 0.3 mLs (0.3 mg) into the muscle once as needed for anaphylaxis    Food allergy       fluconazole 150 MG tablet    DIFLUCAN    2 tablet    Take one as needed repeat in 4 days    Tinea cruris       ginger root 550 MG Caps capsule      Take 550 mg by mouth Up to three times daily        hydrOXYzine 50 MG tablet    ATARAX     Take 50 mg by mouth daily        ketoconazole 2 % cream    NIZORAL    60 g    Apply topically 2 times daily    Tinea cruris       lamoTRIgine 25 MG tablet    LaMICtal     Take 200 mg by mouth daily        levothyroxine 75 MCG tablet    SYNTHROID/LEVOTHROID    90 tablet    TAKE 1 TABLET (75 MCG) BY MOUTH EVERY MORNING.    Acquired hypothyroidism       loperamide 2 MG capsule    IMODIUM     Take 2 mg by mouth 4 times daily as needed for diarrhea        methylPREDNISolone 4 MG tablet    MEDROL DOSEPAK    21 tablet    Follow package instructions    Acute bilateral thoracic back pain, Acute midline low back pain with left-sided sciatica       metoprolol succinate 200 MG 24 hr tablet    TOPROL-XL    180 tablet    Take 2 tablets (400 mg) by mouth daily     Essential hypertension with goal blood pressure less than 140/90       montelukast 10 MG tablet    SINGULAIR     Take 10 mg by mouth        omega-3 acid ethyl esters 1 g capsule    Lovaza    360 capsule    TAKE 2 CAPSULES BY MOUTH TWICE DAILY.    Hypertriglyceridemia       omeprazole 20 MG tablet      Take 20 mg by mouth daily        order for DME     1 Units    SI-loc belt    SI (sacroiliac) joint dysfunction       order for DME      Respironics REMSTAR 60 Series Auto CPAP 10-12 cm H2O, Wisp nasal mask w/a large cushion and a chinstrap        oxyCODONE IR 5 MG tablet    ROXICODONE    35 tablet    Take 1-2 tablets (5-10 mg) by mouth every 6 hours as needed for pain (maximum 4 tablet(s) per day)    DDD (degenerative disc disease), lumbar, Ankylosing spondylitis of sacral region (H)       pregabalin 100 MG capsule    LYRICA    60 capsule    Take 1 capsule (100 mg) by mouth 2 times daily    Chronic midline low back pain without sciatica       pseudoePHEDrine 120 MG 12 hr tablet    SUDAFED    28 tablet    Take 1 tablet (120 mg) by mouth every 12 hours    Nasal congestion       ramipril 10 MG capsule    ALTACE    90 capsule    Take 1 capsule (10 mg) by mouth daily    Hypertension goal BP (blood pressure) < 140/90       risperiDONE 1 MG tablet    risperDAL     Take 1 mg by mouth daily \        rizatriptan 5 MG ODT tab    MAXALT-MLT    30 tablet    Take 1 tablet (5 mg) by mouth at onset of headache for migraine    Migraine with aura and without status migrainosus, not intractable       SIMPONI ARIA 50 MG/4ML injection   Generic drug:  golimumab           syringe (disposable) 1 ML Misc    BD TUBERCULIN SYRINGE    12 each    Equipment being ordered: 1 ml tuberculin syringes to be used for Vitamin B12 injections.    B12 deficiency       tiZANidine 4 MG tablet    ZANAFLEX    90 tablet    TAKE 1 TABLET BY MOUTH THREE TIMES DAILY AS NEEDED    Dorsalgia       traMADol 50 MG tablet    ULTRAM    30 tablet    Take 1 tablet (50 mg)  by mouth every 6 hours as needed for moderate to severe pain    Acute bilateral thoracic back pain, Acute midline low back pain with left-sided sciatica       vitamin D3 68407 UNITS capsule    CHOLECALCIFEROL    24 capsule    Take one capsule every two weeks.    Vitamin D deficiency

## 2018-11-15 ENCOUNTER — RADIANT APPOINTMENT (OUTPATIENT)
Dept: GENERAL RADIOLOGY | Facility: CLINIC | Age: 43
End: 2018-11-15
Attending: PEDIATRICS
Payer: COMMERCIAL

## 2018-11-15 ENCOUNTER — OFFICE VISIT (OUTPATIENT)
Dept: ORTHOPEDICS | Facility: CLINIC | Age: 43
End: 2018-11-15
Payer: COMMERCIAL

## 2018-11-15 VITALS
WEIGHT: 315 LBS | SYSTOLIC BLOOD PRESSURE: 138 MMHG | DIASTOLIC BLOOD PRESSURE: 88 MMHG | HEIGHT: 77 IN | BODY MASS INDEX: 37.19 KG/M2

## 2018-11-15 DIAGNOSIS — M54.2 NECK PAIN: ICD-10-CM

## 2018-11-15 DIAGNOSIS — M54.2 NECK PAIN, ACUTE: Primary | ICD-10-CM

## 2018-11-15 DIAGNOSIS — M54.50 ACUTE BILATERAL LOW BACK PAIN WITHOUT SCIATICA: ICD-10-CM

## 2018-11-15 DIAGNOSIS — M54.16 LUMBAR RADICULAR PAIN: ICD-10-CM

## 2018-11-15 PROCEDURE — 72040 X-RAY EXAM NECK SPINE 2-3 VW: CPT

## 2018-11-15 PROCEDURE — 72100 X-RAY EXAM L-S SPINE 2/3 VWS: CPT

## 2018-11-15 PROCEDURE — 99243 OFF/OP CNSLTJ NEW/EST LOW 30: CPT | Performed by: PEDIATRICS

## 2018-11-15 NOTE — PATIENT INSTRUCTIONS
Plan:  - Today's Plan of Care:  Rehab: Physical Therapy: [facility] - [phone]    -We also discussed other future treatment options:  MRI of the lumbar and cervical spine    Follow Up: as needed    If you have any further questions for your physician or physician s care team you can call [phone] and use option 3 to leave a voice message. Calls received during business hours will be returned same day.
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
## 2018-11-15 NOTE — MR AVS SNAPSHOT
After Visit Summary   11/15/2018    Joel Pineda    MRN: [id number]           Patient Information     Date Of Birth          1975        Visit Information        Provider Department      11/15/2018 3:00 PM Farida Hodgson MD [facility]        Today's Diagnoses     Neck pain, acute    -  1    Acute bilateral low back pain without sciatica          Care Instructions    Plan:  - Today's Plan of Care:  Rehab: Physical Therapy: [facility] - [phone]    -We also discussed other future treatment options:  MRI of the lumbar and cervical spine    Follow Up: as needed    If you have any further questions for your physician or physician s care team you can call [phone] and use option 3 to leave a voice message. Calls received during business hours will be returned same day.              Follow-ups after your visit        Additional Services     [facility], HAND, AND CHIROPRACTIC REFERRAL       Physical Therapy, Hand Therapy and Chiropractic Care are available through:  *[facility]  *Hand Therapy (Occupational Therapy or Physical Therapy)  *[facility]    Call one number to schedule at any of the above locations: [phone].    Physical therapy, Hand therapy and/or Chiropractic care has been recommended by your physician as an excellent treatment option to reduce pain and help people return to normal activities, including sports.  Therapy and/or chiropractic care services are a great complement or alternative to expensive and invasive surgery, injections, or long-term use of prescription medications. The primary goal is to identify the underlying problem and provide you the tools to manage your condition on your own.     Please be aware that coverage of these services is subject to the terms and limitations of your health insurance plan.  Call member services at your health plan with any benefit or  coverage questions.      Please bring the following to your appointment:  *Your personal calendar for scheduling future appointments  *Comfortable clothing                  Your next 10 appointments already scheduled     Nov 16, 2018  3:00 PM CST   Level 1 with [place] 1 INFUSION   [facility]    [address] [place] [id number] [phone]            Nov 20, 2018  2:30 PM CST   Adult Med Follow UP with Lynne Mohr MD   [facility] ([facility])    [address] [address]   [phone]            Dec 14, 2018  1:00 PM CST   Level 1 with BAY 8 INFUSION   [facility]    [address] [place] [id number] [phone]            Jan 30, 2019  1:20 PM CST   Return Visit with Oneil Baxter MD   [facility]    [address] [place] [id number] [phone]              Future tests that were ordered for you today     Open Future Orders        Priority Expected Expires Ordered    [facility], [facility] CH[facility]RACT[facility] REFERRAL Routine  11/15/2019 11/15/2018            Who to contact     If you have questions or need follow up information about today's clinic visit or your schedule please contact [facility] directly at [phone].  Normal or non-critical lab and imaging results will be communicated to you by MyChart, letter or phone within 4 business days after the clinic has received the results. If you do not hear from us within 7 days, please contact the clinic through MyChart or phone. If you have a critical or abnormal lab result, we will notify you by phone as soon as possible.  Submit refill requests through [facility] or call your pharmacy and they will forward the refill request to us. Please allow 3 business days for your refill to be completed.           "Additional Information About Your Visit        MyChart Information     [facility] gives you secure access to your electronic health record. If you see a primary care provider, you can also send messages to your care team and make appointments. If you have questions, please call your primary care clinic.  If you do not have a primary care provider, please call [phone] and they will assist you.        Care EveryWhere ID     This is your Care EveryWhere ID. This could be used by other organizations to access your [facility] medical records  [id number]        Your Vitals Were     Height BMI (Body Mass Index)                6' 5\" (1.956 m) 40.2 kg/m2           Blood Pressure from Last 3 Encounters:   11/15/18 138/88   11/13/18 (!) 140/94   11/10/18 (!) 143/98    Weight from Last 3 Encounters:   11/15/18 (!) 339 lb (153.8 kg)   11/13/18 (!) 339 lb (153.8 kg)   11/10/18 (!) 336 lb (152.4 kg)               Primary Care Provider Office Phone # Fax #    Ksenia Shady Lyles [phone] [address] [facility] [address]        Equal Access to Services     ANNA CANO AH: Hadii aad ku hadasho Soomaali, waaxda luqadaha, qaybta kaalmada adeegyada, isabell maldonadoin haymagnolian elayne monson lahumberto echevarria. So [facility] [place] [phone].    ATENCIÓN: Si habla español, tiene a faustin disposición servicios gratuitos de asistencia lingüística. Llame al [phone].    We comply with applicable federal civil rights laws and [place] laws. We do not discriminate on the basis of race, color, national origin, age, disability, sex, sexual orientation, or gender identity.            Thank you!     Thank you for choosing [facility]  for your care. Our goal is always to provide you with excellent care. Hearing back from our patients is one way we can continue to improve our services. Please take a few minutes to complete the written survey that you may receive in the mail after your visit with us. " Thank you!             Your Updated Medication List - Protect others around you: Learn how to safely use, store and throw away your medicines at www.disposemymeds.org.          This list is accurate as of 11/15/18  3:56 PM.  Always use your most recent med list.                   Brand Name Dispense Instructions for use Diagnosis    acetaminophen 500 MG Caps     60 capsule    Take 500 mg by mouth every 4 hours as needed        ADVAIR DISKUS 250-50 MCG/DOSE diskus inhaler   Generic drug:  fluticasone-salmeterol      Inhale 1 puff into the lungs        albuterol 108 (90 Base) MCG/ACT inhaler    PROAIR HFA/PROVENTIL HFA/VENTOLIN HFA     Inhale 2 puffs into the lungs every 4 hours as needed        ALPRAZolam 1 MG 24 hr tablet    XANAX XR     Take 1 mg by mouth every morning        atorvastatin 40 MG tablet    LIPITOR    90 tablet    TAKE 1 TABLET (40 MG) BY MOUTH DAILY    Mixed hyperlipidemia       celecoxib 200 MG capsule    celeBREX    180 capsule    TAKE 1 CAPSULE BY MOUTH TWICE DAILY AS NEEDED FORMODERATE PAIN    Chronic midline low back pain without sciatica       cetirizine 10 MG tablet    zyrTEC    30 tablet    Take 1 tablet (10 mg) by mouth At Bedtime    Cough       cyanocobalamin 1000 MCG/ML injection    VITAMIN B12    10 mL    Inject 1 mL (1,000 mcg) into the muscle every 30 days    B12 deficiency       EPINEPHrine 0.3 MG/0.3ML injection 2-pack    EPIPEN/ADRENACLICK/or ANY BX GENERIC EQUIV    0.6 mL    Inject 0.3 mLs (0.3 mg) into the muscle once as needed for anaphylaxis    Food allergy       fluconazole 150 MG tablet    DIFLUCAN    2 tablet    Take one as needed repeat in 4 days    Tinea cruris       ginger root 550 MG Caps capsule      Take 550 mg by mouth Up to three times daily        hydrOXYzine 50 MG tablet    ATARAX     Take 50 mg by mouth daily        ketoconazole 2 % cream    NIZORAL    60 g    Apply topically 2 times daily    Tinea cruris       lamoTRIgine 25 MG tablet    LaMICtal     Take 200 mg by  mouth daily        levothyroxine 75 MCG tablet    SYNTHROID/LEVOTHROID    90 tablet    TAKE 1 TABLET (75 MCG) BY MOUTH EVERY MORNING.    Acquired hypothyroidism       loperamide 2 MG capsule    IMODIUM     Take 2 mg by mouth 4 times daily as needed for diarrhea        methylPREDNISolone 4 MG tablet    MEDROL DOSEPAK    21 tablet    Follow package instructions    Acute bilateral thoracic back pain, Acute midline low back pain with left-sided sciatica       metoprolol succinate 200 MG 24 hr tablet    TOPROL-XL    180 tablet    Take 2 tablets (400 mg) by mouth daily    Essential hypertension with goal blood pressure less than 140/90       montelukast 10 MG tablet    SINGULAIR     Take 10 mg by mouth        omega-3 acid ethyl esters 1 g capsule    Lovaza    360 capsule    TAKE 2 CAPSULES BY MOUTH TWICE DAILY.    Hypertriglyceridemia       omeprazole 20 MG tablet      Take 20 mg by mouth daily        order for DME     1 Units    SI-loc belt    SI (sacroiliac) joint dysfunction       order for DME      Respironics REMSTAR 60 Series Auto CPAP 10-12 cm H2O, Wisp nasal mask w/a large cushion and a chinstrap        oxyCODONE IR 5 MG tablet    ROXICODONE    35 tablet    Take 1-2 tablets (5-10 mg) by mouth every 6 hours as needed for pain (maximum 4 tablet(s) per day)    DDD (degenerative disc disease), lumbar, Ankylosing spondylitis of sacral region (H)       pregabalin 100 MG capsule    LYRICA    60 capsule    Take 1 capsule (100 mg) by mouth 2 times daily    Chronic midline low back pain without sciatica       pseudoePHEDrine 120 MG 12 hr tablet    SUDAFED    28 tablet    Take 1 tablet (120 mg) by mouth every 12 hours    Nasal congestion       ramipril 10 MG capsule    ALTACE    90 capsule    Take 1 capsule (10 mg) by mouth daily    Hypertension goal BP (blood pressure) < 140/90       risperiDONE 1 MG tablet    risperDAL     Take 1 mg by mouth daily \        rizatriptan 5 MG ODT tab    MAXALT-MLT    30 tablet    Take 1  tablet (5 mg) by mouth at onset of headache for migraine    Migraine with aura and without status migrainosus, not intractable       SIMPONI ARIA 50 MG/4ML injection   Generic drug:  golimumab           syringe (disposable) 1 ML Misc    BD TUBERCULIN SYRINGE    12 each    Equipment being ordered: 1 ml tuberculin syringes to be used for Vitamin B12 injections.    B12 deficiency       tiZANidine 4 MG tablet    ZANAFLEX    90 tablet    TAKE 1 TABLET BY MOUTH THREE TIMES DAILY AS NEEDED    Dorsalgia       traMADol 50 MG tablet    ULTRAM    30 tablet    Take 1 tablet (50 mg) by mouth every 6 hours as needed for moderate to severe pain    Acute bilateral thoracic back pain, Acute midline low back pain with left-sided sciatica       vitamin D3 48682 UNITS capsule    CHOLECALCIFEROL    24 capsule    Take one capsule every two weeks.    Vitamin D deficiency
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
## 2018-11-16 ENCOUNTER — INFUSION THERAPY VISIT (OUTPATIENT)
Dept: INFUSION THERAPY | Facility: CLINIC | Age: 43
End: 2018-11-16
Payer: COMMERCIAL

## 2018-11-16 VITALS
WEIGHT: 315 LBS | BODY MASS INDEX: 40.24 KG/M2 | HEART RATE: 58 BPM | RESPIRATION RATE: 18 BRPM | SYSTOLIC BLOOD PRESSURE: 117 MMHG | OXYGEN SATURATION: 96 % | TEMPERATURE: 98.3 F | DIASTOLIC BLOOD PRESSURE: 74 MMHG

## 2018-11-16 DIAGNOSIS — M45.8 ANKYLOSING SPONDYLITIS OF SACRAL REGION (H): Primary | ICD-10-CM

## 2018-11-16 PROCEDURE — 96413 CHEMO IV INFUSION 1 HR: CPT | Performed by: INTERNAL MEDICINE

## 2018-11-16 PROCEDURE — 99207 ZZC NO CHARGE LOS: CPT

## 2018-11-16 ASSESSMENT — PAIN SCALES - GENERAL: PAINLEVEL: MODERATE PAIN (5)

## 2018-11-16 NOTE — PATIENT INSTRUCTIONS
Biologic Infusion Post Education: Call the triage nurse at your clinic or seek medical attention if you have chills and/or temperature greater than or equal to 100.5, uncontrolled nausea/vomiting, diarrhea, constipation, dizziness, shortness of breath, chest pain, heart palpitations, weakness or any other new or concerning symptoms, questions or concerns.  You cannot have any live virus vaccines prior to or during treatment or up to 6 months post infusion.  If you have an upcoming surgery, medical procedure or dental procedure during treatment, this should be discussed with your ordering physician and your surgeon/dentist.  If you are having any concerning symptom, if you are unsure if you should get your next infusion or wish to speak to a provider before your next infusion, please call your care coordinator or triage nurse at your clinic to notify them so we can adequately serve you.

## 2018-11-16 NOTE — PROGRESS NOTES
Infusion Nursing Note:  Joel Pineda presents today for his first dose of Simponi.    Patient seen by provider today: No   present during visit today: Not Applicable.    Note: Provided patient, a patient education handout from [facility] on Simponi.     Patient was was mildly flushed at end of infusion.  Patient attributes this to taking Prednisone 3 hours prior to the infusion.  States he gets flushed from Prednisone.  Vital signs within normal limits.    Intravenous Access:  Peripheral IV placed.    Treatment Conditions:  See biological checklist answers from Heather JARA.    Post Infusion Assessment:  Patient tolerated infusion without incident.  Blood return noted pre and post infusion.  Site patent and intact, free from redness, edema or discomfort.  No evidence of extravasations.  Access discontinued per protocol.    Biologic Infusion Post Education: Call the triage nurse at your clinic or seek medical attention if you have chills and/or temperature greater than or equal to 100.5, uncontrolled nausea/vomiting, diarrhea, constipation, dizziness, shortness of breath, chest pain, heart palpitations, weakness or any other new or concerning symptoms, questions or concerns.  You cannot have any live virus vaccines prior to or during treatment or up to 6 months post infusion.  If you have an upcoming surgery, medical procedure or dental procedure during treatment, this should be discussed with your ordering physician and your surgeon/dentist.  If you are having any concerning symptom, if you are unsure if you should get your next infusion or wish to speak to a provider before your next infusion, please call your care coordinator or triage nurse at your clinic to notify them so we can adequately serve you.    Discharge Plan:   Patient will return 12/14/18 for next appointment.   Patient discharged in stable condition accompanied by: self.  Departure Mode: Ambulatory.    Salena Rodriguez  RN

## 2018-11-16 NOTE — MR AVS SNAPSHOT
After Visit Summary   11/16/2018    Joel Pineda    MRN: [id number]           Patient Information     Date Of Birth          1975        Visit Information        Provider Department      11/16/2018 3:00 PM [place] [facility] INFUSION [facility]        Today's Diagnoses     Ankylosing spondylitis of sacral region (H)    -  1      Care Instructions    Biologic Infusion Post Education: Call the triage nurse at your clinic or seek medical attention if you have chills and/or temperature greater than or equal to 100.5, uncontrolled nausea/vomiting, diarrhea, constipation, dizziness, shortness of breath, chest pain, heart palpitations, weakness or any other new or concerning symptoms, questions or concerns.  You cannot have any live virus vaccines prior to or during treatment or up to 6 months post infusion.  If you have an upcoming surgery, medical procedure or dental procedure during treatment, this should be discussed with your ordering physician and your surgeon/dentist.  If you are having any concerning symptom, if you are unsure if you should get your next infusion or wish to speak to a provider before your next infusion, please call your care coordinator or triage nurse at your clinic to notify them so we can adequately serve you.          Follow-ups after your visit        Your next 10 appointments already scheduled     Nov 20, 2018  2:30 PM CST   Adult Med Follow UP with Lynne Mohr MD   [facility] ([facility])    [address] [facility] [address] [place] [id number]   [phone]            Nov 28, 2018  2:10 PM CST   (Arrive by 1:55 PM)   [facility] Sp[facility] with Shady Doherty, PT   [facility] ([facility]    [address] [facility]  [address] [place] [id number]   [phone]            Dec 05, 2018  2:10 PM CST   [facility] Spine with Shady Doherty, PT   [facility] ([facility])    [address] [address]    [phone]            Dec 14, 2018  1:00 PM CST   Level 1 with [place]AY 8 INFUSION   [facility]    [address] [place] [address] [phone]            Jan 30, 2019  1:20 PM CST   Return Visit with Oneil Baxter MD   [facility]    [address] [place] [place] [address] [id number] [phone]              Future tests that were ordered for you today     Open Future Orders        Priority Expected Expires Ordered    [facility] PT, HAND, AND CHIROPRACTIC REFERRAL Routine  11/15/2019 11/15/2018            Who to contact     If you have questions or need follow up information about today's clinic visit or your schedule please contact [facility] directly at [phone].  Normal or non-critical lab and imaging results will be communicated to you by [employer], letter or phone within 4 business days after the clinic has received the results. If you do not hear from us within 7 days, please contact the clinic through [employer] or phone. If you have a critical or abnormal lab result, we will notify you by phone as soon as possible.  Submit refill requests through [employer] or call your pharmacy and they will forward the refill request to us. Please allow 3 business days for your refill to be completed.          Additional Information About Your Visit        [employer] Information     [employer] gives you secure access to your electronic health record. If you see a primary care provider, you can also send messages to your care team and make appointments. If you have questions, please call your primary care clinic.  If you do not have a primary care provider, please call [phone] and they will assist you.      [employer] is an electronic gateway that provides easy, online access to your medical records. With [employer], you can request a clinic appointment, read your test results, renew a prescription or communicate with  your care team.     To access your existing account, please contact your [facility] or call [phone] for assistance.        Care EveryWhere ID     This is your Care EveryWhere ID. This could be used by other organizations to access your [place] medical records  [id number]        Your Vitals Were     Pulse Temperature Respirations Pulse Oximetry BMI (Body Mass Index)       58 98.3  F (36.8  C) (Oral) 18 96% 40.24 kg/m2        Blood Pressure from Last 3 Encounters:   11/16/18 117/74   11/15/18 138/88   11/13/18 (!) 140/94    Weight from Last 3 Encounters:   11/16/18 (!) 153.9 kg (339 lb 4.8 oz)   11/15/18 (!) 153.8 kg (339 lb)   11/13/18 (!) 153.8 kg (339 lb)              Today, you had the following     No orders found for display       Primary Care Provider Office Phone # Fax #    Ksenia Shady Lyles [phone] [address] [facility] [address]        Equal Access to Services     [facility]: Hadii aad ku hadasho Soomaali, waaxda luqadaha, qaybta kaalmada ademaribelyada, isabell shankar . So [facility] [phone].    ATENCIÓN: Si habla español, tiene a faustin disposición servicios gratuitos de asistencia lingüística. [facility] [phone].    We comply with applicable federal civil rights laws and [place] laws. We do not discriminate on the basis of race, color, national origin, age, disability, sex, sexual orientation, or gender identity.            Thank you!     Thank you for choosing [facility]  for your care. Our goal is always to provide you with excellent care. Hearing back from our patients is one way we can continue to improve our services. Please take a few minutes to complete the written survey that you may receive in the mail after your visit with us. Thank you!             Your Updated Medication List - Protect others around you: Learn how to safely use, store and throw away your medicines at  www.disposemymeds.org.          This list is accurate as of 11/16/18  5:44 PM.  Always use your most recent med list.                   Brand Name Dispense Instructions for use Diagnosis    acetaminophen 500 MG Caps     60 capsule    Take 500 mg by mouth every 4 hours as needed        ADVAIR DISKUS 250-50 MCG/DOSE diskus inhaler   Generic drug:  fluticasone-salmeterol      Inhale 1 puff into the lungs        albuterol 108 (90 Base) MCG/ACT inhaler    PROAIR HFA/PROVENTIL HFA/VENTOLIN HFA     Inhale 2 puffs into the lungs every 4 hours as needed        ALPRAZolam 1 MG 24 hr tablet    XANAX XR     Take 1 mg by mouth every morning        atorvastatin 40 MG tablet    LIPITOR    90 tablet    TAKE 1 TABLET (40 MG) BY MOUTH DAILY    Mixed hyperlipidemia       celecoxib 200 MG capsule    celeBREX    180 capsule    TAKE 1 CAPSULE BY MOUTH TWICE DAILY AS NEEDED FORMODERATE PAIN    Chronic midline low back pain without sciatica       cetirizine 10 MG tablet    zyrTEC    30 tablet    Take 1 tablet (10 mg) by mouth At Bedtime    Cough       cyanocobalamin 1000 MCG/ML injection    VITAMIN B12    10 mL    Inject 1 mL (1,000 mcg) into the muscle every 30 days    B12 deficiency       EPINEPHrine 0.3 MG/0.3ML injection 2-pack    EPIPEN/ADRENACLICK/or ANY BX GENERIC EQUIV    0.6 mL    Inject 0.3 mLs (0.3 mg) into the muscle once as needed for anaphylaxis    Food allergy       fluconazole 150 MG tablet    DIFLUCAN    2 tablet    Take one as needed repeat in 4 days    Tinea cruris       ginger root 550 MG Caps capsule      Take 550 mg by mouth Up to three times daily        hydrOXYzine 50 MG tablet    ATARAX     Take 50 mg by mouth daily        ketoconazole 2 % cream    NIZORAL    60 g    Apply topically 2 times daily    Tinea cruris       lamoTRIgine 25 MG tablet    LaMICtal     Take 200 mg by mouth daily        levothyroxine 75 MCG tablet    SYNTHROID/LEVOTHROID    90 tablet    TAKE 1 TABLET (75 MCG) BY MOUTH EVERY MORNING.     Acquired hypothyroidism       loperamide 2 MG capsule    IMODIUM     Take 2 mg by mouth 4 times daily as needed for diarrhea        methylPREDNISolone 4 MG tablet    MEDROL DOSEPAK    21 tablet    Follow package instructions    Acute bilateral thoracic back pain, Acute midline low back pain with left-sided sciatica       metoprolol succinate 200 MG 24 hr tablet    TOPROL-XL    180 tablet    Take 2 tablets (400 mg) by mouth daily    Essential hypertension with goal blood pressure less than 140/90       montelukast 10 MG tablet    SINGULAIR     Take 10 mg by mouth        omega-3 acid ethyl esters 1 g capsule    Lovaza    360 capsule    TAKE 2 CAPSULES BY MOUTH TWICE DAILY.    Hypertriglyceridemia       omeprazole 20 MG tablet      Take 20 mg by mouth daily        order for DME     1 Units    SI-loc belt    SI (sacroiliac) joint dysfunction       order for DME      Respironics REMSTAR 60 Series Auto CPAP 10-12 cm H2O, Wisp nasal mask w/a large cushion and a chinstrap        oxyCODONE IR 5 MG tablet    ROXICODONE    35 tablet    Take 1-2 tablets (5-10 mg) by mouth every 6 hours as needed for pain (maximum 4 tablet(s) per day)    DDD (degenerative disc disease), lumbar, Ankylosing spondylitis of sacral region (H)       pregabalin 100 MG capsule    LYRICA    60 capsule    Take 1 capsule (100 mg) by mouth 2 times daily    Chronic midline low back pain without sciatica       pseudoePHEDrine 120 MG 12 hr tablet    SUDAFED    28 tablet    Take 1 tablet (120 mg) by mouth every 12 hours    Nasal congestion       ramipril 10 MG capsule    ALTACE    90 capsule    Take 1 capsule (10 mg) by mouth daily    Hypertension goal BP (blood pressure) < 140/90       risperiDONE 1 MG tablet    risperDAL     Take 1 mg by mouth daily \        rizatriptan 5 MG ODT tab    MAXALT-MLT    30 tablet    Take 1 tablet (5 mg) by mouth at onset of headache for migraine    Migraine with aura and without status migrainosus, not intractable       SIMPONI  ARIA 50 MG/4ML injection   Generic drug:  golimumab           syringe (disposable) 1 ML Misc    BD TUBERCULIN SYRINGE    12 each    Equipment being ordered: 1 ml tuberculin syringes to be used for Vitamin B12 injections.    B12 deficiency       tiZANidine 4 MG tablet    ZANAFLEX    90 tablet    TAKE 1 TABLET BY MOUTH THREE TIMES DAILY AS NEEDED    Dorsalgia       traMADol 50 MG tablet    ULTRAM    30 tablet    Take 1 tablet (50 mg) by mouth every 6 hours as needed for moderate to severe pain    Acute bilateral thoracic back pain, Acute midline low back pain with left-sided sciatica       vitamin D3 62588 UNITS capsule    CHOLECALCIFEROL    24 capsule    Take one capsule every two weeks.    Vitamin D deficiency

## 2018-11-16 NOTE — PROGRESS NOTES
Treatment Conditions:  Biological Infusion Checklist:    ~~~ NOTE: If the patient answers yes to any of the questions below, hold the infusion and contact ordering provider or on-call provider.    1. Have you recently had an elevated temperature, fever, chills, productive cough, coughing for 3 weeks or longer or hemoptysis,  abnormal vital signs, night sweats,  chest pain or have you noticed a decrease in your appetite, unexplained weight loss or fatigue? No  2. Do you have any open wounds or new incisions? No  3. Do you have any recent or upcoming hospitalizations, surgeries or dental procedures? No  4. Do you currently have or recently have had any signs of illness or infection or are you on any antibiotics? No  5. Have you had any new, sudden or worsening abdominal pain? No  6. Have you or anyone in your household received a live vaccination in the past 4 weeks? Please note:  No live vaccines while on biologic/chemotherapy until 6 months after the last treatment.  Patient can receive the flu vaccine (shot only) and the pneumovax.  It is optimal for the patient to get these vaccines mid cycle, but they can be given at any time as long as it is not on the day of the infusion. No  7. Have you recently been diagnosed with any new nervous system diseases (ie. Multiple sclerosis, Guillain Brandt, seizures, neurological changes) or cancer diagnosis? Are you on any form of radiation or chemotherapy? No  8. Are you pregnant or breast feeding or do you have plans of pregnancy in the future? No  9. Have you been having any signs of worsening depression or suicidal ideations?  (benlysta only) No  10. Have there been any other new onset medical symptoms? No  Post Infusion Assessment:  Biologic Infusion Post Education: Call the triage nurse at your clinic or seek medical attention if you have chills and/or temperature greater than or equal to 100.5, uncontrolled nausea/vomiting, diarrhea, constipation, dizziness, shortness of  breath, chest pain, heart palpitations, weakness or any other new or concerning symptoms, questions or concerns.  You cannot have any live virus vaccines prior to or during treatment or up to 6 months post infusion.  If you have an upcoming surgery, medical procedure or dental procedure during treatment, this should be discussed with your ordering physician and your surgeon/dentist.  If you are having any concerning symptom, if you are unsure if you should get your next infusion or wish to speak to a provider before your next infusion, please call your care coordinator or triage nurse at your clinic to notify them so we can adequately serve you.    Discharge Plan:       MAGAN ROTHMAN RN

## 2018-11-19 ENCOUNTER — MYC REFILL (OUTPATIENT)
Dept: FAMILY MEDICINE | Facility: CLINIC | Age: 43
End: 2018-11-19

## 2018-11-19 DIAGNOSIS — M54.6 ACUTE BILATERAL THORACIC BACK PAIN: ICD-10-CM

## 2018-11-19 DIAGNOSIS — M54.42 ACUTE MIDLINE LOW BACK PAIN WITH LEFT-SIDED SCIATICA: ICD-10-CM

## 2018-11-19 RX ORDER — TRAMADOL HYDROCHLORIDE 50 MG/1
50 TABLET ORAL EVERY 6 HOURS PRN
Qty: 30 TABLET | Refills: 0 | Status: SHIPPED | OUTPATIENT
Start: 2018-11-19 | End: 2018-12-05

## 2018-11-20 ENCOUNTER — OFFICE VISIT (OUTPATIENT)
Dept: PSYCHIATRY | Facility: CLINIC | Age: 43
End: 2018-11-20
Payer: COMMERCIAL

## 2018-11-20 VITALS
HEART RATE: 76 BPM | DIASTOLIC BLOOD PRESSURE: 79 MMHG | SYSTOLIC BLOOD PRESSURE: 142 MMHG | BODY MASS INDEX: 39.37 KG/M2 | WEIGHT: 315 LBS

## 2018-11-20 DIAGNOSIS — F33.2 SEVERE EPISODE OF RECURRENT MAJOR DEPRESSIVE DISORDER, WITHOUT PSYCHOTIC FEATURES (H): Primary | ICD-10-CM

## 2018-11-20 RX ORDER — ALPRAZOLAM 0.5 MG/1
TABLET, EXTENDED RELEASE ORAL
Qty: 45 TABLET | Refills: 0 | Status: SHIPPED | OUTPATIENT
Start: 2018-11-20 | End: 2018-12-28

## 2018-11-20 RX ORDER — RISPERIDONE 1 MG/1
1 TABLET ORAL DAILY
Qty: 60 TABLET | Refills: 0 | Status: SHIPPED | OUTPATIENT
Start: 2018-11-20 | End: 2019-02-12 | Stop reason: DRUGHIGH

## 2018-11-20 RX ORDER — LAMOTRIGINE 25 MG/1
200 TABLET ORAL DAILY
Qty: 30 TABLET | Refills: 0 | Status: SHIPPED | OUTPATIENT
Start: 2018-11-20 | End: 2018-12-18 | Stop reason: DRUGHIGH

## 2018-11-20 RX ORDER — HYDROXYZINE HYDROCHLORIDE 50 MG/1
50 TABLET, FILM COATED ORAL DAILY
Qty: 120 TABLET | Refills: 0 | Status: SHIPPED | OUTPATIENT
Start: 2018-11-20 | End: 2018-12-05

## 2018-11-20 ASSESSMENT — PAIN SCALES - GENERAL: PAINLEVEL: MODERATE PAIN (4)

## 2018-11-20 NOTE — PROGRESS NOTES
"  Psychiatry Clinic Progress Note                                                                   Joel Pineda is a 43 year old male who prefers the name \"Tito\" and pronoun he, him, his.  Therapist: Will be starting DBT at [facility] with Sapna Cavazos  PCP: Ksenia Lyles  Other Providers: Currently followed by Dr. Homa Davis at [facility]  Referred by Dr. Davis for evaluation of depression and candidacy for TMS.     History was provided by patient who was a good historian.    Pertinent Background:  This patient has been previously diagnosed with bipolar disorder, however, review of history is not supportive of this diagnosis (see section specific information below).  Psych critical item history includes suicidal ideation, psychosis [sxs include auditory hallucinations], mutiple psychotropic trials and psych hosp (>5).     Interim History                                                                                                        4, 4     The patient is a good historian, reports good treatment adherence and was last seen for diagnostic assessment and initiation of an acute series of rTMS.  Since the last visit:    Prior to today's appointment, pt attempted an acute trial of rTMS. Unfortunately, he experienced severe ear pain which was subsequently discovered to be attributable to acute otitis media. Even after treatment of infection, pt experienced continued ringing in his ears and increase in visual hallucinations. As such, pt discontinued TMS treatment.    Pt's primary psychiatrist (Dr. Homa Weeks) will be changing practice to [facility] and will not longer be able to follow pt. As such, pt will be seeing a psychiatrist at [facility] in several weeks.    He states that he was not able to tolerate side effects from TMS including increased visual hallucinations (seeing shadow figures in his peripheral vision) and tinnitus. Discussed additional options for managing his " "depression sx including ECT. He states that he has previously received ECT at [facility] with Dr. Angel. He followed with him through 2015.    He recalls having headaches after ECT; father \"put his foot down to stop ECT\" as he [father] felt pt was very confused after the procedure. He has not considered this treatment modality since.    Discussed consideration of MAOI. Pt reports that he responded to Emsam which pushed him into hypomania. Discussed possibility of trialing another MAOI. He expressed a wish to start with another psychiatrist before considering this . Encouraged pt to discuss with new provider.    Because pt will be transferring care to new provider at [facility], he will not be in need of refills.    Psychiatric Review of Symptoms:   Depression: Positive for depressive symptoms sadness, anhedonia, social isolation, increased appetite, psychomotor retardation, fatigue, feeling worthless, inappropriate guilt, poor concentration, indecisiveness, intrusive suicidal ideation without intent or plan to act.   Anxiety: Positive for symptoms of anxiety including obsessions of intrusive thoughts which occur almost daily, compulsions including typing things that he will see while driving, Also has generalized worry inculding intrusvie thoughts of his parents dying, fatigue, poor concentration, muscle tension.   Intrusive thoughts of being bullied in his job, feeling like an outcast as an adolescent.  PTSD: Denies traumatic experience of sufficient severity to meet criterion A necessary for diagnosis of PTSD.   Regina: Negative, denies psychiatric hospitalization for regina. Also reports that symptoms of psychosis have dissipated since starting immunotherapy for ankylosing spondylitis.  Psychosis: Positive for history of auditory and visual hallucinations likely related to autoimmune condition (ankylosing spondyitis)  Eating disorder: Negative  Homicidal Ideation: Negative       Recent Substance " "Use:  none reported        Social/ Family History                                  [per patient report]                                 1ea,1ea   FINANCIAL SUPPORT- social security disability       CHILDREN- None, but has a cat named Elpidio  LIVING SITUATION- currently lives alone in a Town Home that he owns  LEGAL- None  EARLY HISTORY/ EDUCATION- See HPI  SOCIAL/ SPIRITUAL SUPPORT- States that he has a couple of people in his social [place] that are supportive, but does not feel that he has reached a \"cirtical mass\" of support.       TRAUMA HISTORY (self-report)- See HPI  FEELS SAFE AT HOME- Yes  FAMILY HISTORY-  Paternal grandfather committed suicide when he was a teenager in high school. Father with severe depression. Brother and sister with chemical use disorders.    Medical / Surgical History                                                                                                                  Patient Active Problem List   Diagnosis     Major depressive disorder, recurrent episode (H)     Intermittent asthma     Hyperlipidemia LDL goal <130     Hypertension goal BP (blood pressure) < 140/90     Chronic nonallergic rhinitis     History of diverticulitis of colon     Obesity (BMI 30-39.9)     Health Care Home     PE (pulmonary embolism)     Diverticulosis     GERD (gastroesophageal reflux disease)     Anxiety     LLOYD (obstructive sleep apnea)- mild (AHI 11)     Intracranial arachnoid cyst     Facet arthritis of cervical region (H)     Acquired hypothyroidism     Bipolar 2 disorder (H)     Allergic rhinitis, unspecified allergic rhinitis type     Chronic midline low back pain without sciatica     Irritable bowel syndrome with diarrhea     B12 deficiency     Impaired glucose tolerance     Essential hypertension with goal blood pressure less than 140/90     Psychophysiological insomnia     Chronic pain syndrome     Ankylosing spondylitis of sacral region (H)     Morbid obesity due to hypertriglyceridemia " (H)     Fatty infiltration of liver     DDD (degenerative disc disease), lumbar       Past Surgical History:   Procedure Laterality Date     BACK SURGERY  10/07    lumbar discectomy L5-S1     COLONOSCOPY      Note: colonoscopy scheduled with [facility] on Friday, 9/4/15     COSMETIC SURGERY  2012    Nose Exterior - functional     GI SURGERY  August 2013    Sigmoidectomy     HERNIA REPAIR, UMBILICAL  8/23/11    Dr. Evan whiting     INCISION AND DRAINAGE, ABSCESS, COMPLEX  8/23/11    umbilical, Dr. Evan Beavers     LAPAROSCOPIC ASSISTED COLECTOMY LEFT (DESCENDING)  8/15/2013    Procedure: LAPAROSCOPIC ASSISTED COLECTOMY LEFT (DESCENDING);  Laparoscopic Hand Assisted Sigmoid Resection, Mobilization of Splenic Fissure, coloproctoscopy, *Latex Free Room* Anesthesia General with Pain block  ;  Surgeon: Aurora Justice MD;  Location: [facility] OR     NERVE SURGERY  8/18/11    RF ablation @ L3-S1 @ [facility]     RECONSTRUCT NOSE AND SEPTUM (FUNCTIONAL)  10/14/2011    Procedure:RECONSTRUCT NOSE AND SEPTUM (FUNCTIONAL); Functional Septorhinoplasty, Turbinate Reduction, ; Surgeon:CEDRIC CUEVAS; Location:[facility] OR     SINUS SURGERY  10/1/01    ethmoidectomy chronic sinusitis        Medical Review of Systems                                                                                                    2,10   GENERAL: Negative for malaise, significant weight loss and fever  HEENT: Positive for tinnitus  RESPIRATORY: Negative for dyspnea  CARDIOVASCULAR: Negative for chest pain, leg swelling and palpitations  GI: Positive for nausea  MUSCULOSKELETAL: Positive for lumbar, neck, and SI joint pain  PSYCH: See HPI  ENDOCRINE: Positive for polydipsia  NEURO:  Positive for headaches; Negative for seizures    Allergy                                Banana; Nitroglycerin; Penicillins; Provigil [modafinil]; Ciprofloxacin; Gadolinium; Dulera; Dye [contrast dye]; Levaquin; Neurontin [gabapentin]; Nortriptyline; Varicella virus vaccine live;  Ciprofloxacin; Flagyl [metronidazole hcl]; Latex; Metronidazole; and No clinical screening - see comments  Current Medications                                                                                                       Current Outpatient Prescriptions   Medication Sig Dispense Refill     acetaminophen 500 MG CAPS Take 500 mg by mouth every 4 hours as needed 60 capsule      albuterol (PROAIR HFA/PROVENTIL HFA/VENTOLIN HFA) 108 (90 BASE) MCG/ACT Inhaler Inhale 2 puffs into the lungs every 4 hours as needed       ALPRAZolam (XANAX XR) 1 MG 24 hr tablet Take 1 mg by mouth every morning       atorvastatin (LIPITOR) 40 MG tablet TAKE 1 TABLET (40 MG) BY MOUTH DAILY 90 tablet 1     celecoxib (CELEBREX) 200 MG capsule TAKE 1 CAPSULE BY MOUTH TWICE DAILY AS NEEDED FORMODERATE PAIN 180 capsule 1     cetirizine (ZYRTEC) 10 MG tablet Take 1 tablet (10 mg) by mouth At Bedtime 30 tablet 11     cyanocobalamin (VITAMIN B12) 1000 MCG/ML injection Inject 1 mL (1,000 mcg) into the muscle every 30 days 10 mL 0     fluconazole (DIFLUCAN) 150 MG tablet Take one as needed repeat in 4 days 2 tablet 0     fluticasone-salmeterol (ADVAIR DISKUS) 250-50 MCG/DOSE diskus inhaler Inhale 1 puff into the lungs       Ginger, Zingiber officinalis, (GINGER ROOT) 550 MG CAPS capsule Take 550 mg by mouth Up to three times daily       golimumab (SIMPONI ARIA) 50 MG/4ML injection        hydrOXYzine (ATARAX) 50 MG tablet Take 50 mg by mouth daily       ketoconazole (NIZORAL) 2 % cream Apply topically 2 times daily 60 g 1     lamoTRIgine (LAMICTAL) 25 MG tablet Take 200 mg by mouth daily        levothyroxine (SYNTHROID/LEVOTHROID) 75 MCG tablet TAKE 1 TABLET (75 MCG) BY MOUTH EVERY MORNING. 90 tablet 0     loperamide (IMODIUM) 2 MG capsule Take 2 mg by mouth 4 times daily as needed for diarrhea       methylPREDNISolone (MEDROL DOSEPAK) 4 MG tablet Follow package instructions 21 tablet 0     metoprolol succinate (TOPROL-XL) 200 MG 24 hr tablet Take  2 tablets (400 mg) by mouth daily 180 tablet 0     montelukast (SINGULAIR) 10 MG tablet Take 10 mg by mouth       omega-3 acid ethyl esters (LOVAZA) 1 g capsule TAKE 2 CAPSULES BY MOUTH TWICE DAILY. 360 capsule 1     omeprazole 20 MG tablet Take 20 mg by mouth daily        order for DME SI-loc belt 1 Units 0     pregabalin (LYRICA) 100 MG capsule Take 1 capsule (100 mg) by mouth 2 times daily 60 capsule 5     pseudoePHEDrine (SUDAFED) 120 MG 12 hr tablet Take 1 tablet (120 mg) by mouth every 12 hours 28 tablet 0     ramipril (ALTACE) 10 MG capsule Take 1 capsule (10 mg) by mouth daily 90 capsule 1     risperiDONE (RISPERDAL) 1 MG tablet Take 1 mg by mouth daily \       rizatriptan (MAXALT-MLT) 5 MG ODT tab Take 1 tablet (5 mg) by mouth at onset of headache for migraine 30 tablet 5     syringe, disposable, (BD TUBERCULIN SYRINGE) 1 ML MISC Equipment being ordered: 1 ml tuberculin syringes to be used for Vitamin B12 injections. 12 each 1     tiZANidine (ZANAFLEX) 4 MG tablet TAKE 1 TABLET BY MOUTH THREE TIMES DAILY AS NEEDED 90 tablet 4     traMADol (ULTRAM) 50 MG tablet Take 1 tablet (50 mg) by mouth every 6 hours as needed for moderate to severe pain 30 tablet 0     vitamin D3 (CHOLECALCIFEROL) 64559 UNITS capsule Take one capsule every two weeks. 24 capsule 1     EPINEPHrine (EPIPEN/ADRENACLICK/OR ANY BX GENERIC EQUIV) 0.3 MG/0.3ML injection 2-pack Inject 0.3 mLs (0.3 mg) into the muscle once as needed for anaphylaxis (Patient not taking: Reported on 11/15/2018) 0.6 mL 3     order for DME Respironics REMSTAR 60 Series Auto CPAP 10-12 cm H2O, Wisp nasal mask w/a large cushion and a chinstrap       oxyCODONE IR (ROXICODONE) 5 MG tablet Take 1-2 tablets (5-10 mg) by mouth every 6 hours as needed for pain (maximum 4 tablet(s) per day) (Patient not taking: Reported on 11/15/2018) 35 tablet 0     Vitals                                                                                                                        3, 3   /79 (BP Location: Left arm, Patient Position: Chair, Cuff Size: Adult Regular)  Pulse 76  Wt (!) 150.6 kg (332 lb)  BMI 39.37 kg/m2     Mental Status Exam                                                                                    9, 14 cog gs     Alertness: alert  and oriented  Appearance: well groomed  Behavior/Demeanor: cooperative, pleasant and calm, with good  eye contact   Speech: normal and regular rate and rhythm  Language: intact and no problems  Psychomotor: normal or unremarkable  Mood: depressed and anxious  Affect: restricted; was congruent to mood; was congruent to content  Thought Process/Associations: unremarkable  Thought Content:  Reports none;  Denies suicidal and violent ideation  Perception:  Reports visual hallucinations;  Denies auditory hallucinations  Insight: adequate  Judgment: good  Cognition: (6) oriented: time, person, and place  attention span: intact  concentration: intact  recent memory: intact  remote memory: intact  fund of knowledge: appropriate  Gait/Station and/or Muscle Strength/Tone: unremarkable    Labs and Data                                                                                                                 Rating Scales:    PHQ9    PHQ9 Today:   PHQ-9 SCORE 10/25/2018 10/26/2018 10/29/2018   Total Score - - -   Total Score MyChart - - -   Total Score 19 12 18   Total Score - - -         Diagnosis and Assessment                                                                             m2, h3     Joel Pineda is a 43 year old male with previous psychiatric history of MDD, recurrent, severe who initially presented to this provider for evaluation of candidacy for repetitive Transcranial Magnetic Stimulation for treatment resistant depression. Patient was referred by his primary psychiatrist, Dr. Homa Davis. He has a well documented failure of adequate trials of >= 4 antidepressants which represent multiple antidepressant classes as well  as augmentation therapies and neuromodulation strategies including ECT. The patient has completed an adequate dose of individual psychotherapy. He was approved for an acute course of rTMS. Unfortunately, pt was not able to tolerate stimulation and chose to discontinue treatment secondary to symptoms of tinnitus.    Pt presents today to discuss alternative treatment options for management of his depression. Given history of substantial pharmacotherapy resistance, suggested another course of ECT vs. Consideration of an MAOI. Because pt's previous outpatient psychiatrist will be transitioning to the [facility], he is scheduled to transfer care to another psychiatric provider at [facility]. He prefers to discuss further medication options with new provider. He declines to consider another course of ECT.    Suicide Risk Assessment:  Today Joel Pineda reports passive suicidal ideation without plan or intent. In addition, he has notable risk factors for self-harm, including significant pain, on opiates, lives alone/ isolated and male. However, risk is mitigated by no h/o suicide attempt, no plan or intent, no h/o risky impulsive behavior, describes a safety plan, h/o seeking help when needed, future oriented, none to minimal alcohol use , commitment to family and stable housing. Therefore, based on all available evidence including the factors cited above, he does not appear to be at imminent risk for self-harm, does not meet criteria for a 72-hr hold, and therefore involuntary hospitalization will not be pursued at this  time. However, based on degree of symptoms, voluntary referral for outpatient TMS was recommended, he accepted this offer.     Today the following issues were addressed:    1) Major depressive disorder, recurrent, severe    [place] Prescription Monitoring Program [] was not checked today:  will be checked next visit.    PSYCHOTROPIC DRUG INTERACTIONS: none clinically relevant    Plan                                                                                                                     m2, h3      1) Major depressive disorder, recurrent, severe  -- Medications: Continue current outpatient psychotropic medications   - Consider trial of MAOI  -- Psychotherapy: Continue regular individual psychotherapy   -- Procedures:    - Pt was approved for an acute series of rTMS, however, was unable to continue 2/2 intolerable SEs   - Recommended pt consider acute course of ECT  -- Referrals: None    RTC: As needed    CRISIS NUMBERS:   Provided routinely in AVS.    Treatment Risk Statement:  The patient understands the risks, benefits, adverse effects and alternatives. Agrees to treatment with the capacity to do so. No medical contraindications to treatment. Agrees to call clinic for any problems. The patient understands to call 911 or go to the nearest ED if life threatening or urgent symptoms occur.        Psychiatry Clinic Individual Psychotherapy Note                                                                     [16]     Start time: 2:30pm        End time: 2:50pm  Date last reviewed: 11/20/2018       Date next due: 2/20/2019     Subjective: This supportive psychotherapy session addressed issues related to orientation to therapy, goals of therapy, and current stressors consisting of current mood symptoms.  Patient's reaction: Preparatory in the context of mental status appropriate for ambulatory setting.  Progress: fair  Plan: RTC as needed  Psychotherapy services during this visit included myself and the patient.   Treatment Plan      SYMPTOMS; PROBLEMS   MEASURABLE GOALS;    FUNCTIONAL IMPROVEMENT INTERVENTIONS;   GAINS MADE DISCHARGE CRITERIA   Depression: anhedonia   find enjoyment at least once a day self-care skills  strength focus marked symptom improvement   Depression: depressed mood and feeling hopelesss   develop strategies for thought distraction when ruminating and reduce  feeling overwhelmed/ improve decision making skills increase coping skills  strength focus  stress management marked symptom improvement     PROVIDER:  Lynne Mohr MD

## 2018-11-28 ENCOUNTER — THERAPY VISIT (OUTPATIENT)
Dept: PHYSICAL THERAPY | Facility: CLINIC | Age: 43
End: 2018-11-28
Payer: COMMERCIAL

## 2018-11-28 DIAGNOSIS — M54.50 ACUTE BILATERAL LOW BACK PAIN WITHOUT SCIATICA: ICD-10-CM

## 2018-11-28 DIAGNOSIS — M25.561 RIGHT KNEE PAIN: Primary | ICD-10-CM

## 2018-11-28 DIAGNOSIS — M54.2 NECK PAIN, ACUTE: ICD-10-CM

## 2018-11-28 PROCEDURE — 97161 PT EVAL LOW COMPLEX 20 MIN: CPT | Mod: GP | Performed by: PHYSICAL THERAPIST

## 2018-11-28 PROCEDURE — 97110 THERAPEUTIC EXERCISES: CPT | Mod: GP | Performed by: PHYSICAL THERAPIST

## 2018-11-28 PROCEDURE — 97530 THERAPEUTIC ACTIVITIES: CPT | Mod: GP | Performed by: PHYSICAL THERAPIST
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
## 2018-11-29 ENCOUNTER — TELEPHONE (OUTPATIENT)
Dept: URGENT CARE | Facility: URGENT CARE | Age: 43
End: 2018-11-29

## 2018-11-29 ENCOUNTER — OFFICE VISIT (OUTPATIENT)
Dept: URGENT CARE | Facility: URGENT CARE | Age: 43
End: 2018-11-29
Payer: COMMERCIAL

## 2018-11-29 VITALS
HEART RATE: 79 BPM | WEIGHT: 315 LBS | TEMPERATURE: 97.8 F | OXYGEN SATURATION: 97 % | DIASTOLIC BLOOD PRESSURE: 80 MMHG | SYSTOLIC BLOOD PRESSURE: 110 MMHG | RESPIRATION RATE: 20 BRPM | BODY MASS INDEX: 40.53 KG/M2

## 2018-11-29 DIAGNOSIS — H60.331 ACUTE SWIMMER'S EAR OF RIGHT SIDE: Primary | ICD-10-CM

## 2018-11-29 PROCEDURE — 99213 OFFICE O/P EST LOW 20 MIN: CPT | Performed by: FAMILY MEDICINE

## 2018-11-29 RX ORDER — HYDROCORTISONE AND ACETIC ACID 20.75; 10.375 MG/ML; MG/ML
3 SOLUTION AURICULAR (OTIC) 4 TIMES DAILY
Qty: 3 ML | Refills: 0 | Status: SHIPPED | OUTPATIENT
Start: 2018-11-29 | End: 2019-02-12

## 2018-11-29 ASSESSMENT — ENCOUNTER SYMPTOMS
NAUSEA: 0
DIAPHORESIS: 0
CHILLS: 0
SORE THROAT: 0
FEVER: 0
COUGH: 0
SHORTNESS OF BREATH: 0
VOMITING: 0
DIARRHEA: 0
RHINORRHEA: 0

## 2018-11-29 NOTE — PROGRESS NOTES
[facility] Initial Evaluation  Subjective:  Patient is a 43 year old male presenting with rehab left ankle/foot hpi.                                      Pertinent medical history includes:  Asthma, depression, high blood pressure, mental illness, migraines/headaches, numbness/tingling, overweight, sleep disorder/apnea and thyroid problems.  Medical allergies: yes.  Other surgeries include:  None reported.  Current medications:  Anti-depressants, anti-inflammatory, high blood pressure medication, muscle relaxants, pain medication, sleep medication and thyroid medication.                                      Objective:  System    Physical Exam    General     ROS    Assessment/Plan:

## 2018-11-29 NOTE — MR AVS SNAPSHOT
After Visit Summary   11/29/2018    Joel Pineda    MRN: [id number]           Patient Information     Date Of Birth          1975        Visit Information        Provider Department      11/29/2018 5:10 PM Arpan Roberson MD [facility]        Today's Diagnoses     Acute swimmer's ear of right side    -  1       Follow-ups after your visit        Your next 10 appointments already scheduled     Dec 05, 2018  2:10 PM CST   [facility] with Shady Doherty, PT   [facility] ([facility]    [address] [facility] [address]  [facility] [address]   [phone]            Dec 12, 2018  2:10 PM CST   [facility] with Shady Doherty, PT   [facility] ([facility]    [address] [facility] [address]  [facility] [address]   [phone]            Dec 14, 2018  1:00 PM CST   Level 1 with [address] ([facility])    [address] [place] [id number] [phone]            Dec 19, 2018  2:10 PM CST   [facility] with Shady Doherty, PT   [facility] ([facility])    [address] [address]   [phone]            Jan 30, 2019  1:20 PM CST   Return Visit with Oneil Baxter MD   [facility]    [address] [facility] [address]   [phone]              Who to contact     If you have questions or need follow up information about today's clinic visit or your schedule please contact [facility] directly at [phone].  Normal or non-critical lab and imaging results will be communicated to you by MyChart, letter or phone within 4 business days after the clinic has received the results. If you do not hear from us within 7 days, please contact the clinic through MyChart or phone. If you have a critical or abnormal lab result, we will notify you by phone as soon as possible.  Submit refill  requests through [email or web address] or call your pharmacy and they will forward the refill request to us. Please allow 3 business days for your refill to be completed.          Additional Information About Your Visit        PEPperPRINThart Information     [email or web address] gives you secure access to your electronic health record. If you see a primary care provider, you can also send messages to your care team and make appointments. If you have questions, please call your primary care clinic.  If you do not have a primary care provider, please call [phone] and they will assist you.        Care EveryWhere ID     This is your Care EveryWhere ID. This could be used by other organizations to access your [facility] medical records  [id number]        Your Vitals Were     Pulse Temperature Respirations Pulse Oximetry BMI (Body Mass Index)       79 97.8  F (36.6  C) (Oral) 20 97% 40.53 kg/m2        Blood Pressure from Last 3 Encounters:   11/29/18 (!) 152/102   11/20/18 142/79   11/16/18 117/74    Weight from Last 3 Encounters:   11/29/18 (!) 341 lb 12.8 oz (155 kg)   11/20/18 (!) 332 lb (150.6 kg)   11/16/18 (!) 339 lb 4.8 oz (153.9 kg)              Today, you had the following     No orders found for display         Today's Medication Changes          These changes are accurate as of 11/29/18  5:50 PM.  If you have any questions, ask your nurse or doctor.               Start taking these medicines.        Dose/Directions    acetic acid-hydrocortisone 1-2 % otic solution   Commonly known as:  VOSOL-HC   Used for:  Acute swimmer's ear of right side   Started by:  Arpan Roberson MD        Dose:  3 drop   Place 3 drops into the right ear 4 times daily for 5 days   Quantity:  3 mL   Refills:  0            Where to get your medicines      These medications were sent to [id number]  [address]  [address] [place] AT [facility] [address] [place] [address]     Phone:  [phone]     acetic  acid-hydrocortisone 1-2 % otic solution                Primary Care Provider Office Phone # Fax #    Ksenia Shady Lyles [phone] [address] [facility] [address]        Equal Access to Services     ANNA CANO : Hadii floyd ku hadcoryo Soomaali, waaxda luqadaha, qaybta kaalmada adeegyada, isabell lovettn marychuymaribel monson vonnie echevarria. So [facility] [phone].    ATENCIÓN: Si habla español, tiene a faustin disposición servicios gratuitos de asistencia lingüística. Llame al [phone].    We comply with applicable federal civil rights laws and [place] laws. We do not discriminate on the basis of race, color, national origin, age, disability, sex, sexual orientation, or gender identity.            Thank you!     Thank you for choosing [facility]  for your care. Our goal is always to provide you with excellent care. Hearing back from our patients is one way we can continue to improve our services. Please take a few minutes to complete the written survey that you may receive in the mail after your visit with us. Thank you!             Your Updated Medication List - Protect others around you: Learn how to safely use, store and throw away your medicines at www.disposemymeds.org.          This list is accurate as of 11/29/18  5:50 PM.  Always use your most recent med list.                   Brand Name Dispense Instructions for use Diagnosis    acetaminophen 500 MG Caps     60 capsule    Take 500 mg by mouth every 4 hours as needed        acetic acid-hydrocortisone 1-2 % otic solution    VOSOL-HC    3 mL    Place 3 drops into the right ear 4 times daily for 5 days    Acute swimmer's ear of right side       ADVAIR DISKUS 250-50 MCG/DOSE inhaler   Generic drug:  fluticasone-salmeterol      Inhale 1 puff into the lungs        albuterol 108 (90 Base) MCG/ACT inhaler    PROAIR HFA/PROVENTIL HFA/VENTOLIN HFA     Inhale 2 puffs into the lungs every 4 hours as needed         ALPRAZolam 0.5 MG 24 hr tablet    XANAX XR    45 tablet    Take 1 mg and 0.5 mg on alternating days.    Severe episode of recurrent major depressive disorder, without psychotic features (H)       atorvastatin 40 MG tablet    LIPITOR    90 tablet    TAKE 1 TABLET (40 MG) BY MOUTH DAILY    Mixed hyperlipidemia       celecoxib 200 MG capsule    celeBREX    180 capsule    TAKE 1 CAPSULE BY MOUTH TWICE DAILY AS NEEDED FORMODERATE PAIN    Chronic midline low back pain without sciatica       cetirizine 10 MG tablet    zyrTEC    30 tablet    Take 1 tablet (10 mg) by mouth At Bedtime    Cough       cholecalciferol 06932 units (1250 mcg) capsule    VITAMIN D3    24 capsule    Take one capsule every two weeks.    Vitamin D deficiency       EPINEPHrine 0.3 MG/0.3ML injection 2-pack    EPIPEN/ADRENACLICK/or ANY BX GENERIC EQUIV    0.6 mL    Inject 0.3 mLs (0.3 mg) into the muscle once as needed for anaphylaxis    Food allergy       fluconazole 150 MG tablet    DIFLUCAN    2 tablet    Take one as needed repeat in 4 days    Tinea cruris       ginger root 550 MG Caps capsule      Take 550 mg by mouth Up to three times daily        hydrOXYzine 50 MG tablet    ATARAX    120 tablet    Take 1 tablet (50 mg) by mouth daily    Severe episode of recurrent major depressive disorder, without psychotic features (H)       ketoconazole 2 % external cream    NIZORAL    60 g    Apply topically 2 times daily    Tinea cruris       lamoTRIgine 25 MG tablet    LaMICtal    30 tablet    Take 8 tablets (200 mg) by mouth daily    Severe episode of recurrent major depressive disorder, without psychotic features (H)       levothyroxine 75 MCG tablet    SYNTHROID/LEVOTHROID    90 tablet    TAKE 1 TABLET (75 MCG) BY MOUTH EVERY MORNING.    Acquired hypothyroidism       loperamide 2 MG capsule    IMODIUM     Take 2 mg by mouth 4 times daily as needed for diarrhea        methylPREDNISolone 4 MG tablet therapy pack    MEDROL DOSEPAK    21 tablet    Follow  package instructions    Acute bilateral thoracic back pain, Acute midline low back pain with left-sided sciatica       metoprolol succinate  MG 24 hr tablet    TOPROL-XL    180 tablet    Take 2 tablets (400 mg) by mouth daily    Essential hypertension with goal blood pressure less than 140/90       montelukast 10 MG tablet    SINGULAIR     Take 10 mg by mouth        omega-3 acid ethyl esters 1 g capsule    LOVAZA    360 capsule    TAKE 2 CAPSULES BY MOUTH TWICE DAILY.    Hypertriglyceridemia       omeprazole 20 MG tablet      Take 20 mg by mouth daily        order for DME     1 Units    SI-loc belt    SI (sacroiliac) joint dysfunction       order for DME      Respironics REMSTAR 60 Series Auto CPAP 10-12 cm H2O, Wisp nasal mask w/a large cushion and a chinstrap        oxyCODONE 5 MG tablet    ROXICODONE    35 tablet    Take 1-2 tablets (5-10 mg) by mouth every 6 hours as needed for pain (maximum 4 tablet(s) per day)    DDD (degenerative disc disease), lumbar, Ankylosing spondylitis of sacral region (H)       pregabalin 100 MG capsule    LYRICA    60 capsule    Take 1 capsule (100 mg) by mouth 2 times daily    Chronic midline low back pain without sciatica       pseudoePHEDrine 120 MG 12 hr tablet    SUDAFED    28 tablet    Take 1 tablet (120 mg) by mouth every 12 hours    Nasal congestion       ramipril 10 MG capsule    ALTACE    90 capsule    Take 1 capsule (10 mg) by mouth daily    Hypertension goal BP (blood pressure) < 140/90       risperiDONE 1 MG tablet    risperDAL    60 tablet    Take 1 tablet (1 mg) by mouth daily \    Severe episode of recurrent major depressive disorder, without psychotic features (H)       rizatriptan 5 MG ODT    MAXALT-MLT    30 tablet    Take 1 tablet (5 mg) by mouth at onset of headache for migraine    Migraine with aura and without status migrainosus, not intractable       SIMPONI ARIA 50 MG/4ML injection   Generic drug:  golimumab           syringe (disposable) 1 ML Misc    BD  TUBERCULIN SYRINGE    12 each    Equipment being ordered: 1 ml tuberculin syringes to be used for Vitamin B12 injections.    B12 deficiency       tiZANidine 4 MG tablet    ZANAFLEX    90 tablet    TAKE 1 TABLET BY MOUTH THREE TIMES DAILY AS NEEDED    Dorsalgia       traMADol 50 MG tablet    ULTRAM    30 tablet    Take 1 tablet (50 mg) by mouth every 6 hours as needed for moderate to severe pain    Acute bilateral thoracic back pain, Acute midline low back pain with left-sided sciatica       vitamin B-12 1000 MCG/ML injection    CYANOCOBALAMIN    10 mL    Inject 1 mL (1,000 mcg) into the muscle every 30 days    B12 deficiency

## 2018-11-29 NOTE — PROGRESS NOTES
SUBJECTIVE:   Joel Pineda is a 43 year old male presenting with a chief complaint of   Chief Complaint   Patient presents with     Otalgia     ringing, otalgia, discharge and loss of hearing.        He is an established patient of [facility].    Right ear pain, clear fluid discharge and tinnitus pain and fullness over the past 4 days.   Moderate subjective hearing loss.     Simponi infusion 2 weeks ago. -- ankylosing spondylitis     Tinnitus over the past 3 months. Daily and continuous although varies in intensity from 2-5/10.   Has been considering seeing ENT but has not made an appointment.     Noted mildly elevated blood pressure today. Currently on blood pressure.       Review of Systems   Constitutional: Negative for chills, diaphoresis and fever.   HENT: Negative for congestion, ear pain, rhinorrhea and sore throat.    Respiratory: Negative for cough and shortness of breath.    Gastrointestinal: Negative for diarrhea, nausea and vomiting.       Patient Active Problem List   Diagnosis     Major depressive disorder, recurrent episode (H)     Intermittent asthma     Hyperlipidemia LDL goal <130     Hypertension goal BP (blood pressure) < 140/90     Chronic nonallergic rhinitis     History of diverticulitis of colon     Obesity (BMI 30-39.9)     Health Care Home     PE (pulmonary embolism)     Diverticulosis     GERD (gastroesophageal reflux disease)     Anxiety     LLOYD (obstructive sleep apnea)- mild (AHI 11)     Intracranial arachnoid cyst     Facet arthritis of cervical region (H)     Acquired hypothyroidism     Bipolar 2 disorder (H)     Allergic rhinitis, unspecified allergic rhinitis type     Chronic midline low back pain without sciatica     Irritable bowel syndrome with diarrhea     B12 deficiency     Impaired glucose tolerance     Essential hypertension with goal blood pressure less than 140/90     Psychophysiological insomnia     Chronic pain syndrome     Ankylosing spondylitis of sacral region (H)      "Morbid obesity due to hypertriglyceridemia (H)     Fatty infiltration of liver     DDD (degenerative disc disease), lumbar     Right knee pain     Acute bilateral low back pain without sciatica     Neck pain, acute      Past Medical History:   Diagnosis Date     Acne      Chest pain     Chest pain, regulated w/BP meds. Clear arteries.     Skin exam, screening for cancer 12/3/2013     Family History   Problem Relation Age of Onset     Musculoskeletal Disorder Mother      back     Anxiety Disorder Mother      Colon Polyps Mother      Ulcerative Colitis Mother      and ischemic small intestine, surgery     Hypertension Mother      Breast Cancer Mother      Osteoporosis Mother      Diabetes Mother      Type 2, Diagnosed in 2014     Depression Mother      Takes Cymbalta to help with chronic pain + depx     Thyroid Disease Mother      Hypothyroidism     Obesity Mother      Under much better control latter half of 2015     Musculoskeletal Disorder Father      back     Substance Abuse Father      Hypertension Father      Hyperlipidemia Father      Depression Father      Off meds for many years. Seems \"ok\"     Heart Disease Maternal Grandmother      Heart Disease Maternal Grandfather      Psychotic Disorder Paternal Grandfather      Suicide Paternal Grandfather      Depression Paternal Grandfather      Pediatrician. Committed suicide by pistol in 1990.     Musculoskeletal Disorder Brother      back     Depression Brother      Expressed as anger and moodiness     Substance Abuse Sister      Depression Sister      Mental Health Therapist, yet no anti-depressants?     Anxiety Disorder Sister      Mental Health Therapist, yet no anti-anxiety meds?     Other Cancer Other      Bladder Cancer - Fatal     Substance Abuse Brother      Colon Cancer No family hx of      Crohn Disease No family hx of      Anesthesia Reaction No family hx of      Cancer No family hx of      No family history of skin cancer     Current Outpatient " Prescriptions   Medication Sig Dispense Refill     acetaminophen 500 MG CAPS Take 500 mg by mouth every 4 hours as needed 60 capsule      albuterol (PROAIR HFA/PROVENTIL HFA/VENTOLIN HFA) 108 (90 BASE) MCG/ACT Inhaler Inhale 2 puffs into the lungs every 4 hours as needed       ALPRAZolam (XANAX XR) 0.5 MG 24 hr tablet Take 1 mg and 0.5 mg on alternating days. 45 tablet 0     atorvastatin (LIPITOR) 40 MG tablet TAKE 1 TABLET (40 MG) BY MOUTH DAILY 90 tablet 1     celecoxib (CELEBREX) 200 MG capsule TAKE 1 CAPSULE BY MOUTH TWICE DAILY AS NEEDED FORMODERATE PAIN 180 capsule 1     cetirizine (ZYRTEC) 10 MG tablet Take 1 tablet (10 mg) by mouth At Bedtime 30 tablet 11     cyanocobalamin (VITAMIN B12) 1000 MCG/ML injection Inject 1 mL (1,000 mcg) into the muscle every 30 days 10 mL 0     EPINEPHrine (EPIPEN/ADRENACLICK/OR ANY BX GENERIC EQUIV) 0.3 MG/0.3ML injection 2-pack Inject 0.3 mLs (0.3 mg) into the muscle once as needed for anaphylaxis 0.6 mL 3     fluconazole (DIFLUCAN) 150 MG tablet Take one as needed repeat in 4 days 2 tablet 0     fluticasone-salmeterol (ADVAIR DISKUS) 250-50 MCG/DOSE diskus inhaler Inhale 1 puff into the lungs       Ginger, Zingiber officinalis, (GINGER ROOT) 550 MG CAPS capsule Take 550 mg by mouth Up to three times daily       golimumab (SIMPONI ARIA) 50 MG/4ML injection        hydrOXYzine (ATARAX) 50 MG tablet Take 1 tablet (50 mg) by mouth daily 120 tablet 0     ketoconazole (NIZORAL) 2 % cream Apply topically 2 times daily 60 g 1     lamoTRIgine (LAMICTAL) 25 MG tablet Take 8 tablets (200 mg) by mouth daily 30 tablet 0     levothyroxine (SYNTHROID/LEVOTHROID) 75 MCG tablet TAKE 1 TABLET (75 MCG) BY MOUTH EVERY MORNING. 90 tablet 0     loperamide (IMODIUM) 2 MG capsule Take 2 mg by mouth 4 times daily as needed for diarrhea       metoprolol succinate (TOPROL-XL) 200 MG 24 hr tablet Take 2 tablets (400 mg) by mouth daily 180 tablet 0     montelukast (SINGULAIR) 10 MG tablet Take 10 mg by  mouth       omega-3 acid ethyl esters (LOVAZA) 1 g capsule TAKE 2 CAPSULES BY MOUTH TWICE DAILY. 360 capsule 1     omeprazole 20 MG tablet Take 20 mg by mouth daily        order for DME Respironics REMSTAR 60 Series Auto CPAP 10-12 cm H2O, Wisp nasal mask w/a large cushion and a chinstrap       order for DME SI-loc belt 1 Units 0     oxyCODONE IR (ROXICODONE) 5 MG tablet Take 1-2 tablets (5-10 mg) by mouth every 6 hours as needed for pain (maximum 4 tablet(s) per day) 35 tablet 0     pregabalin (LYRICA) 100 MG capsule Take 1 capsule (100 mg) by mouth 2 times daily 60 capsule 5     pseudoePHEDrine (SUDAFED) 120 MG 12 hr tablet Take 1 tablet (120 mg) by mouth every 12 hours 28 tablet 0     ramipril (ALTACE) 10 MG capsule Take 1 capsule (10 mg) by mouth daily 90 capsule 1     risperiDONE (RISPERDAL) 1 MG tablet Take 1 tablet (1 mg) by mouth daily \ 60 tablet 0     rizatriptan (MAXALT-MLT) 5 MG ODT tab Take 1 tablet (5 mg) by mouth at onset of headache for migraine 30 tablet 5     syringe, disposable, (BD TUBERCULIN SYRINGE) 1 ML MISC Equipment being ordered: 1 ml tuberculin syringes to be used for Vitamin B12 injections. 12 each 1     tiZANidine (ZANAFLEX) 4 MG tablet TAKE 1 TABLET BY MOUTH THREE TIMES DAILY AS NEEDED 90 tablet 4     traMADol (ULTRAM) 50 MG tablet Take 1 tablet (50 mg) by mouth every 6 hours as needed for moderate to severe pain 30 tablet 0     vitamin D3 (CHOLECALCIFEROL) 61320 UNITS capsule Take one capsule every two weeks. 24 capsule 1     methylPREDNISolone (MEDROL DOSEPAK) 4 MG tablet Follow package instructions (Patient not taking: Reported on 11/29/2018) 21 tablet 0     Social History   Substance Use Topics     Smoking status: Never Smoker     Smokeless tobacco: Never Used     Alcohol use No       OBJECTIVE  BP (!) 152/102  Pulse 79  Temp 97.8  F (36.6  C) (Oral)  Resp 20  Wt (!) 341 lb 12.8 oz (155 kg)  SpO2 97%  BMI 40.53 kg/m2    Physical Exam   Constitutional: He appears well-developed  and well-nourished. No distress.   HENT:   Head: Normocephalic and atraumatic.   Right Ear: Tympanic membrane and external ear normal.   Left Ear: Tympanic membrane and external ear normal.   Mouth/Throat: Oropharynx is clear and moist.   Eyes: Conjunctivae are normal.   Neck: Normal range of motion.   Pulmonary/Chest: Effort normal and breath sounds normal. No respiratory distress.   Neurological: He is alert.   Skin: Skin is warm and dry.   Psychiatric: He has a normal mood and affect.   Nursing note and vitals reviewed.      Labs:  No results found. However, due to the size of the patient record, not all encounters were searched. Please check Results Review for a complete set of results.    X-Ray was not done.    ASSESSMENT:   Diagnosis Comments   1. Acute swimmer's ear of right side  acetic acid-hydrocortisone (VOSOL-HC) 1-2 % otic solution         PLAN  Proper use of Q tips emphasized.   Patient educational/instructional material provided including reasons for follow-up   The patient indicates understanding of these issues and agrees with the plan.  Arpan Roberson MD

## 2018-11-30 RX ORDER — NEOMYCIN SULFATE, POLYMYXIN B SULFATE AND HYDROCORTISONE 10; 3.5; 1 MG/ML; MG/ML; [USP'U]/ML
4 SUSPENSION/ DROPS AURICULAR (OTIC) 4 TIMES DAILY
Qty: 10 ML | Refills: 0 | Status: SHIPPED | OUTPATIENT
Start: 2018-11-30 | End: 2018-12-18

## 2018-11-30 NOTE — TELEPHONE ENCOUNTER
Medication pended for provider to review. There is an allergy warning which needs provider's review.     Routing to provider to review and send to pharmacy (if indicated).     Heather White RN

## 2018-11-30 NOTE — TELEPHONE ENCOUNTER
Reason for Call:  Other prescription    Detailed comments: Joel went to pharmacy to  his ear drops. They are not covered by insurance and are over $500. Asking for an alternative be sent to his pharmacy     Phone Number Patient can be reached at: Home number on file [phone] (home)    Best Time: Any    Can we leave a detailed message on this number? YES    Call taken on 11/29/2018 at 6:22 PM by Stormy Piña

## 2018-11-30 NOTE — TELEPHONE ENCOUNTER
Try Cortisporin Otic, 1 bottle. 4 drops affected ear 3 times a day for 10 days. See if the insurance will cover this one.  Antonieta Mccormack PA-C

## 2018-11-30 NOTE — TELEPHONE ENCOUNTER
..Reason for Call:  prescription    Detailed comments: ear drops are non-formulary; needs alternative; very costly - over $500.     [facility] [phone]    Phone Number Patient can be reached at: Home number on file [phone] (home)    Best Time: anytime    Can we leave a detailed message on this number? YES    Call taken on 11/30/2018 at 7:18 AM by Acacia Chamorro

## 2018-12-01 ENCOUNTER — NURSE TRIAGE (OUTPATIENT)
Dept: NURSING | Facility: CLINIC | Age: 43
End: 2018-12-01

## 2018-12-01 NOTE — TELEPHONE ENCOUNTER
Joel went to urgent care on Thursday for ears.   Today Joel is calling with questions on ears and medication.

## 2018-12-05 ENCOUNTER — MYC REFILL (OUTPATIENT)
Dept: FAMILY MEDICINE | Facility: CLINIC | Age: 43
End: 2018-12-05

## 2018-12-05 ENCOUNTER — OFFICE VISIT (OUTPATIENT)
Dept: FAMILY MEDICINE | Facility: CLINIC | Age: 43
End: 2018-12-05
Payer: COMMERCIAL

## 2018-12-05 ENCOUNTER — THERAPY VISIT (OUTPATIENT)
Dept: PHYSICAL THERAPY | Facility: CLINIC | Age: 43
End: 2018-12-05
Payer: COMMERCIAL

## 2018-12-05 VITALS
HEIGHT: 78 IN | OXYGEN SATURATION: 96 % | HEART RATE: 75 BPM | RESPIRATION RATE: 18 BRPM | WEIGHT: 315 LBS | TEMPERATURE: 97.8 F | DIASTOLIC BLOOD PRESSURE: 85 MMHG | SYSTOLIC BLOOD PRESSURE: 130 MMHG | BODY MASS INDEX: 36.45 KG/M2

## 2018-12-05 DIAGNOSIS — M54.42 ACUTE MIDLINE LOW BACK PAIN WITH LEFT-SIDED SCIATICA: ICD-10-CM

## 2018-12-05 DIAGNOSIS — M54.6 ACUTE BILATERAL THORACIC BACK PAIN: ICD-10-CM

## 2018-12-05 DIAGNOSIS — H66.90 ACUTE OTITIS MEDIA, UNSPECIFIED OTITIS MEDIA TYPE: Primary | ICD-10-CM

## 2018-12-05 DIAGNOSIS — M54.2 NECK PAIN, ACUTE: ICD-10-CM

## 2018-12-05 DIAGNOSIS — J45.20 MILD INTERMITTENT ASTHMA WITHOUT COMPLICATION: Chronic | ICD-10-CM

## 2018-12-05 DIAGNOSIS — D84.9 IMMUNOSUPPRESSION (H): ICD-10-CM

## 2018-12-05 DIAGNOSIS — M54.50 ACUTE BILATERAL LOW BACK PAIN WITHOUT SCIATICA: ICD-10-CM

## 2018-12-05 DIAGNOSIS — M25.561 RIGHT KNEE PAIN: ICD-10-CM

## 2018-12-05 PROCEDURE — 99213 OFFICE O/P EST LOW 20 MIN: CPT | Performed by: PHYSICIAN ASSISTANT

## 2018-12-05 PROCEDURE — 97110 THERAPEUTIC EXERCISES: CPT | Mod: GP | Performed by: PHYSICAL THERAPIST

## 2018-12-05 PROCEDURE — 97530 THERAPEUTIC ACTIVITIES: CPT | Mod: GP | Performed by: PHYSICAL THERAPIST

## 2018-12-05 RX ORDER — AZITHROMYCIN 250 MG/1
TABLET, FILM COATED ORAL
Qty: 6 TABLET | Refills: 0 | Status: SHIPPED | OUTPATIENT
Start: 2018-12-05 | End: 2018-12-18

## 2018-12-05 RX ORDER — TRAMADOL HYDROCHLORIDE 50 MG/1
50 TABLET ORAL EVERY 6 HOURS PRN
Qty: 30 TABLET | Refills: 0 | Status: SHIPPED | OUTPATIENT
Start: 2018-12-05 | End: 2019-01-28

## 2018-12-05 ASSESSMENT — PAIN SCALES - GENERAL: PAINLEVEL: MILD PAIN (3)

## 2018-12-05 NOTE — PATIENT INSTRUCTIONS
Middle Ear Infection (Adult)  You have an infection of the middle ear, the space behind the eardrum. This is also called acute otitis media (AOM). Sometimes it is caused by the common cold. This is because congestion can block the internal passage (eustachian tube) that drains fluid from the middle ear. When the middle ear fills with fluid, bacteria can grow there and cause an infection. Oral antibiotics are used to treat this illness, not ear drops. Symptoms usually start to improve within 1 to 2 days of treatment.    Home care  The following are general care guidelines:    Finish all of the antibiotic medicine given, even though you may feel better after the first few days.    You may use over-the-counter medicine, such as acetaminophen or ibuprofen, to control pain and fever, unless something else was prescribed. If you have chronic liver or kidney disease or have ever had a stomach ulcer or gastrointestinal bleeding, talk with your healthcare provider before using these medicines. Do not give aspirin to anyone under 18 years of age who has a fever. It may cause severe illness or death.  Follow-up care  Follow up with your healthcare provider, or as advised, in 2 weeks if all symptoms have not gotten better, or if hearing doesn't go back to normal within 1 month.  When to seek medical advice  Call your healthcare provider right away if any of these occur:    Ear pain gets worse or does not improve after 3 days of treatment    Unusual drowsiness or confusion    Neck pain, stiff neck, or headache    Fluid or blood draining from the ear canal    Fever of 100.4 F (38 C) or as advised     Seizure  Date Last Reviewed: 6/1/2016 2000-2018 [employer]. [address] [place] [address], [place] [address]. All rights reserved. This information is not intended as a substitute for professional medical care. Always follow your healthcare professional's instructions.

## 2018-12-05 NOTE — MR AVS SNAPSHOT
After Visit Summary   12/5/2018    Joel Pineda    MRN: [id number]           Patient Information     Date Of Birth          1975        Visit Information        Provider Department      12/5/2018 3:20 PM Nuno Obando PA [facility]        Today's Diagnoses     Acute otitis media, unspecified otitis media type    -  1    Mild intermittent asthma without complication        Immunosuppression (H)          Care Instructions      Middle Ear Infection (Adult)  You have an infection of the middle ear, the space behind the eardrum. This is also called acute otitis media (AOM). Sometimes it is caused by the common cold. This is because congestion can block the internal passage (eustachian tube) that drains fluid from the middle ear. When the middle ear fills with fluid, bacteria can grow there and cause an infection. Oral antibiotics are used to treat this illness, not ear drops. Symptoms usually start to improve within 1 to 2 days of treatment.    Home care  The following are general care guidelines:    Finish all of the antibiotic medicine given, even though you may feel better after the first few days.    You may use over-the-counter medicine, such as acetaminophen or ibuprofen, to control pain and fever, unless something else was prescribed. If you have chronic liver or kidney disease or have ever had a stomach ulcer or gastrointestinal bleeding, talk with your healthcare provider before using these medicines. Do not give aspirin to anyone under 18 years of age who has a fever. It may cause severe illness or death.  Follow-up care  Follow up with your healthcare provider, or as advised, in 2 weeks if all symptoms have not gotten better, or if hearing doesn't go back to normal within 1 month.  When to seek medical advice  Call your healthcare provider right away if any of these occur:    Ear pain gets worse or does not improve after 3 days of treatment    Unusual  drowsiness or confusion    Neck pain, stiff neck, or headache    Fluid or blood draining from the ear canal    Fever of 100.4 F (38 C) or as advised     Seizure  Date Last Reviewed: 6/1/2016 2000-2018 [email or web address]. [address] [place] [address]. All rights reserved. This information is not intended as a substitute for professional medical care. Always follow your healthcare professional's instructions.                Follow-ups after your visit        Follow-up notes from your care team     Return in about 1 week (around 12/12/2018), or if symptoms worsen or fail to improve.      Your next 10 appointments already scheduled     Dec 12, 2018  2:10 PM CST   [facility] Shady Doherty, PT   [facility] ([facility] [place])    [address] [address]   [phone]            Dec 14, 2018  1:00 PM CST   Level 1 with [address] ([facility])    [address] [place] [id number] [phone]            Dec 19, 2018  2:10 PM CST   [facility] with Shady Doherty, PT   [facility] ([facility] [place] [facility] [place])    [address] [address]   [phone]            Jan 30, 2019  1:20 PM CST   [facility] with Oneil Baxter MD   [facility]    [address] [facility] [address]   [phone]              Who to contact     If you have questions or need follow up information about today's clinic visit or your schedule please contact [facility] directly at [phone].  Normal or non-critical lab and imaging results will be communicated to you by MyChart, letter or phone within 4 business days after the clinic has received the results. If you do not hear from us within 7 days, please contact the clinic through MyChart or phone. If you have a critical or abnormal lab result, we will notify you by phone as  "soon as possible.  Submit refill requests through [employer] or call your pharmacy and they will forward the refill request to us. Please allow 3 business days for your refill to be completed.          Additional Information About Your Visit        [employer] Information     [employer] gives you secure access to your electronic health record. If you see a primary care provider, you can also send messages to your care team and make appointments. If you have questions, please call your primary care clinic.  If you do not have a primary care provider, please call [phone] and they will assist you.        Care EveryWhere ID     This is your Care EveryWhere ID. This could be used by other organizations to access your [place] medical records  [id number]        Your Vitals Were     Pulse Temperature Respirations Height Pulse Oximetry BMI (Body Mass Index)    75 97.8  F (36.6  C) (Oral) 18 6' 5.5\" (1.969 m) 96% 39.73 kg/m2       Blood Pressure from Last 3 Encounters:   12/05/18 130/85   11/29/18 110/80   11/20/18 142/79    Weight from Last 3 Encounters:   12/05/18 (!) 339 lb 6.4 oz (154 kg)   11/29/18 (!) 341 lb 12.8 oz (155 kg)   11/20/18 (!) 332 lb (150.6 kg)              Today, you had the following     No orders found for display         Today's Medication Changes          These changes are accurate as of 12/5/18  3:47 PM.  If you have any questions, ask your nurse or doctor.               Start taking these medicines.        Dose/Directions    azithromycin 250 MG tablet   Commonly known as:  ZITHROMAX Z-OTONIEL   Used for:  Acute otitis media, unspecified otitis media type   Started by:  Nuno Obando PA        2 tabs day one then 1 tab qd   Quantity:  6 tablet   Refills:  0            Where to get your medicines      These medications were sent to [facility] [address]  [address] [facility] [place] AT [facility] [address] [place] [address]     Phone:  [phone]    "  azithromycin 250 MG tablet         Some of these will need a paper prescription and others can be bought over the counter.  Ask your nurse if you have questions.     Bring a paper prescription for each of these medications     traMADol 50 MG tablet                Primary Care Provider Office Phone # Fax #    Ksenia Shady Lyles [phone] [address] [facility] [address]        Equal Access to Services     [facility] JEROME : Hadii aad ku hadasho Soomaali, waaxda luqadaha, qaybta kaalmada adeegyada, waxay idiin hayaan adeeg kharash la'aan ah. So [facility] [phone].    ATENCIÓN: Si habla español, tiene a faustin disposición servicios gratuitos de asistencia lingüística. Pattieame al [phone].    We comply with applicable federal civil rights laws and [place] laws. We do not discriminate on the basis of race, color, national origin, age, disability, sex, sexual orientation, or gender identity.            Thank you!     Thank you for choosing [facility]  for your care. Our goal is always to provide you with excellent care. Hearing back from our patients is one way we can continue to improve our services. Please take a few minutes to complete the written survey that you may receive in the mail after your visit with us. Thank you!             Your Updated Medication List - Protect others around you: Learn how to safely use, store and throw away your medicines at www.disposemymeds.org.          This list is accurate as of 12/5/18  3:47 PM.  Always use your most recent med list.                   Brand Name Dispense Instructions for use Diagnosis    acetaminophen 500 MG Caps     60 capsule    Take 500 mg by mouth every 4 hours as needed        ADVAIR DISKUS 250-50 MCG/DOSE inhaler   Generic drug:  fluticasone-salmeterol      Inhale 1 puff into the lungs        albuterol 108 (90 Base) MCG/ACT inhaler    PROAIR HFA/PROVENTIL HFA/VENTOLIN HFA     Inhale 2 puffs into the lungs  every 4 hours as needed        ALPRAZolam 0.5 MG 24 hr tablet    XANAX XR    45 tablet    Take 1 mg and 0.5 mg on alternating days.    Severe episode of recurrent major depressive disorder, without psychotic features (H)       atorvastatin 40 MG tablet    LIPITOR    90 tablet    TAKE 1 TABLET (40 MG) BY MOUTH DAILY    Mixed hyperlipidemia       azithromycin 250 MG tablet    ZITHROMAX Z-OTONIEL    6 tablet    2 tabs day one then 1 tab qd    Acute otitis media, unspecified otitis media type       celecoxib 200 MG capsule    celeBREX    180 capsule    TAKE 1 CAPSULE BY MOUTH TWICE DAILY AS NEEDED FORMODERATE PAIN    Chronic midline low back pain without sciatica       cetirizine 10 MG tablet    zyrTEC    30 tablet    Take 1 tablet (10 mg) by mouth At Bedtime    Cough       cholecalciferol 09679 units (1250 mcg) capsule    VITAMIN D3    24 capsule    Take one capsule every two weeks.    Vitamin D deficiency       EPINEPHrine 0.3 MG/0.3ML injection 2-pack    EPIPEN/ADRENACLICK/or ANY BX GENERIC EQUIV    0.6 mL    Inject 0.3 mLs (0.3 mg) into the muscle once as needed for anaphylaxis    Food allergy       ginger root 550 MG Caps capsule      Take 550 mg by mouth Up to three times daily        ketoconazole 2 % external cream    NIZORAL    60 g    Apply topically 2 times daily    Tinea cruris       lamoTRIgine 25 MG tablet    LaMICtal    30 tablet    Take 8 tablets (200 mg) by mouth daily    Severe episode of recurrent major depressive disorder, without psychotic features (H)       levothyroxine 75 MCG tablet    SYNTHROID/LEVOTHROID    90 tablet    TAKE 1 TABLET (75 MCG) BY MOUTH EVERY MORNING.    Acquired hypothyroidism       loperamide 2 MG capsule    IMODIUM     Take 2 mg by mouth 4 times daily as needed for diarrhea        metoprolol succinate  MG 24 hr tablet    TOPROL-XL    180 tablet    Take 2 tablets (400 mg) by mouth daily    Essential hypertension with goal blood pressure less than 140/90       montelukast 10 MG  tablet    SINGULAIR     Take 10 mg by mouth        neomycin-polymyxin-hydrocortisone 3.5-30411-0 otic suspension    CORTISPORIN    10 mL    Place 4 drops into the right ear 4 times daily    Acute swimmer's ear of right side       omega-3 acid ethyl esters 1 g capsule    LOVAZA    360 capsule    TAKE 2 CAPSULES BY MOUTH TWICE DAILY.    Hypertriglyceridemia       omeprazole 20 MG tablet      Take 20 mg by mouth daily        order for DME     1 Units    SI-loc belt    SI (sacroiliac) joint dysfunction       order for DME      Respironics REMSTAR 60 Series Auto CPAP 10-12 cm H2O, Wisp nasal mask w/a large cushion and a chinstrap        oxyCODONE 5 MG tablet    ROXICODONE    35 tablet    Take 1-2 tablets (5-10 mg) by mouth every 6 hours as needed for pain (maximum 4 tablet(s) per day)    DDD (degenerative disc disease), lumbar, Ankylosing spondylitis of sacral region (H)       pregabalin 100 MG capsule    LYRICA    60 capsule    Take 1 capsule (100 mg) by mouth 2 times daily    Chronic midline low back pain without sciatica       pseudoePHEDrine 120 MG 12 hr tablet    SUDAFED    28 tablet    Take 1 tablet (120 mg) by mouth every 12 hours    Nasal congestion       ramipril 10 MG capsule    ALTACE    90 capsule    Take 1 capsule (10 mg) by mouth daily    Hypertension goal BP (blood pressure) < 140/90       risperiDONE 1 MG tablet    risperDAL    60 tablet    Take 1 tablet (1 mg) by mouth daily \    Severe episode of recurrent major depressive disorder, without psychotic features (H)       rizatriptan 5 MG ODT    MAXALT-MLT    30 tablet    Take 1 tablet (5 mg) by mouth at onset of headache for migraine    Migraine with aura and without status migrainosus, not intractable       SIMPONI ARIA 50 MG/4ML injection   Generic drug:  golimumab           syringe (disposable) 1 ML Misc    BD TUBERCULIN SYRINGE    12 each    Equipment being ordered: 1 ml tuberculin syringes to be used for Vitamin B12 injections.    B12 deficiency        tiZANidine 4 MG tablet    ZANAFLEX    90 tablet    TAKE 1 TABLET BY MOUTH THREE TIMES DAILY AS NEEDED    Dorsalgia       traMADol 50 MG tablet    ULTRAM    30 tablet    Take 1 tablet (50 mg) by mouth every 6 hours as needed for moderate to severe pain    Acute bilateral thoracic back pain, Acute midline low back pain with left-sided sciatica       vitamin B-12 1000 MCG/ML injection    CYANOCOBALAMIN    10 mL    Inject 1 mL (1,000 mcg) into the muscle every 30 days    B12 deficiency

## 2018-12-05 NOTE — TELEPHONE ENCOUNTER
Requested Prescriptions   Pending Prescriptions Disp Refills     traMADol (ULTRAM) 50 MG tablet 30 tablet 0     Sig: Take 1 tablet (50 mg) by mouth every 6 hours as needed for moderate to severe pain    There is no refill protocol information for this order          traMADol (ULTRAM) 50 MG tablet      Last Written Prescription Date:  11/19/18  Last Fill Quantity: 30,   # refills: 0  Last Office Visit: 11/13/18  Future Office visit:    Next 5 appointments (look out 90 days)     Jan 30, 2019  1:20 PM CST   Return Visit with Oneil Baxter MD   [facility] ([facility])    [address]  [place] [address]   [phone]                   Routing refill request to provider for review/approval because:  Drug not on the [facility], [facility] or [facility] refill protocol or controlled substance

## 2018-12-05 NOTE — PROGRESS NOTES
SUBJECTIVE:   Joel Pineda is a 43 year old male who presents to clinic today for the following health issues:      ENT Symptoms             Symptoms: cc Present Absent Comment   Fever/Chills   x    Fatigue  x     Muscle Aches   x    Eye Irritation  x     Sneezing   x    Nasal Vitor/Drg  x     Sinus Pressure/Pain  x     Loss of smell  x     Dental pain  x  Rt side of the jaw   Sore Throat  x     Swollen Glands   x    Ear Pain/Fullness  x     Cough   x    Wheeze  x  mild   Chest Pain   x    Shortness of breath  x     Rash   x    Other   x      Symptom duration:   seen last wk, dx/ w/RT AOM started on drops, last night started with congestion, ST and left ear starting to hurt   Symptom severity:  No   Treatments tried:  OTC   Contacts:  None      hx asthma . LLOYD, and ankylosing spondy on immunosuppresants.              Allergies   Allergen Reactions     Banana Shortness Of Breath     Pt reports organic Banana is okay.      Nitroglycerin Palpitations     Penicillins Anaphylaxis     Provigil [Modafinil] Shortness Of Breath     headache     Ciprofloxacin Palpitations, Other (See Comments) and Rash     dizziness (versus metronidazole taken at same time)     Gadolinium Hives and Itching     Patient was premedicated for the contrast allergy. He did still have a reaction a few hours after injection. Hives and itching. Dr. Gomez told tech to inform pt he should only have contrast again in the future when premedicated and at a hospital. Not at an outpatient facility.      Dulera Other (See Comments)     Hoarse voice     Dye [Contrast Dye] Other (See Comments) and Hives     Moderate flushing, CT contrast     Levaquin Other (See Comments)     tendonitis     Neurontin [Gabapentin] Hives     Moderate hives     Nortriptyline Hives     Varicella Virus Vaccine Live      Rash     Ciprofloxacin Palpitations     Flagyl [Metronidazole Hcl] Palpitations and Hives     Latex Rash     Metronidazole Palpitations, Other (See  Comments) and Rash     dizziness (versus ciprofloxacin taken at same time)     No Clinical Screening - See Comments Rash     Nitrile gloves       Patient Active Problem List   Diagnosis     Major depressive disorder, recurrent episode (H)     Intermittent asthma     Hyperlipidemia LDL goal <130     Hypertension goal BP (blood pressure) < 140/90     Chronic nonallergic rhinitis     History of diverticulitis of colon     Obesity (BMI 30-39.9)     Health Care Home     PE (pulmonary embolism)     Diverticulosis     GERD (gastroesophageal reflux disease)     Anxiety     LLOYD (obstructive sleep apnea)- mild (AHI 11)     Intracranial arachnoid cyst     Facet arthritis of cervical region (H)     Acquired hypothyroidism     Bipolar 2 disorder (H)     Allergic rhinitis, unspecified allergic rhinitis type     Chronic midline low back pain without sciatica     Irritable bowel syndrome with diarrhea     B12 deficiency     Impaired glucose tolerance     Essential hypertension with goal blood pressure less than 140/90     Psychophysiological insomnia     Chronic pain syndrome     Ankylosing spondylitis of sacral region (H)     Morbid obesity due to hypertriglyceridemia (H)     Fatty infiltration of liver     DDD (degenerative disc disease), lumbar     Right knee pain     Acute bilateral low back pain without sciatica     Neck pain, acute       Past Medical History:   Diagnosis Date     Acne      Chest pain     Chest pain, regulated w/BP meds. Clear arteries.     Skin exam, screening for cancer 12/3/2013         Current Outpatient Prescriptions on File Prior to Visit:  acetaminophen 500 MG CAPS Take 500 mg by mouth every 4 hours as needed   albuterol (PROAIR HFA/PROVENTIL HFA/VENTOLIN HFA) 108 (90 BASE) MCG/ACT Inhaler Inhale 2 puffs into the lungs every 4 hours as needed   ALPRAZolam (XANAX XR) 0.5 MG 24 hr tablet Take 1 mg and 0.5 mg on alternating days.   atorvastatin (LIPITOR) 40 MG tablet TAKE 1 TABLET (40 MG) BY MOUTH DAILY    celecoxib (CELEBREX) 200 MG capsule TAKE 1 CAPSULE BY MOUTH TWICE DAILY AS NEEDED FORMODERATE PAIN   cetirizine (ZYRTEC) 10 MG tablet Take 1 tablet (10 mg) by mouth At Bedtime   cyanocobalamin (VITAMIN B12) 1000 MCG/ML injection Inject 1 mL (1,000 mcg) into the muscle every 30 days   EPINEPHrine (EPIPEN/ADRENACLICK/OR ANY BX GENERIC EQUIV) 0.3 MG/0.3ML injection 2-pack Inject 0.3 mLs (0.3 mg) into the muscle once as needed for anaphylaxis   fluticasone-salmeterol (ADVAIR DISKUS) 250-50 MCG/DOSE diskus inhaler Inhale 1 puff into the lungs   Ginger, Zingiber officinalis, (GINGER ROOT) 550 MG CAPS capsule Take 550 mg by mouth Up to three times daily   golimumab (SIMPONI ARIA) 50 MG/4ML injection    ketoconazole (NIZORAL) 2 % cream Apply topically 2 times daily   lamoTRIgine (LAMICTAL) 25 MG tablet Take 8 tablets (200 mg) by mouth daily   levothyroxine (SYNTHROID/LEVOTHROID) 75 MCG tablet TAKE 1 TABLET (75 MCG) BY MOUTH EVERY MORNING.   loperamide (IMODIUM) 2 MG capsule Take 2 mg by mouth 4 times daily as needed for diarrhea   metoprolol succinate (TOPROL-XL) 200 MG 24 hr tablet Take 2 tablets (400 mg) by mouth daily   montelukast (SINGULAIR) 10 MG tablet Take 10 mg by mouth   neomycin-polymyxin-hydrocortisone (CORTISPORIN) 3.5-79538-0 otic suspension Place 4 drops into the right ear 4 times daily   omega-3 acid ethyl esters (LOVAZA) 1 g capsule TAKE 2 CAPSULES BY MOUTH TWICE DAILY.   omeprazole 20 MG tablet Take 20 mg by mouth daily    order for [facility] Respironics REMSTAR 60 Series Auto CPAP 10-12 cm H2O, Wisp nasal mask w/a large cushion and a chinstrap   order for [facility] SI-loc belt   oxyCODONE IR (ROXICODONE) 5 MG tablet Take 1-2 tablets (5-10 mg) by mouth every 6 hours as needed for pain (maximum 4 tablet(s) per day)   pregabalin (LYRICA) 100 MG capsule Take 1 capsule (100 mg) by mouth 2 times daily   pseudoePHEDrine (SUDAFED) 120 MG 12 hr tablet Take 1 tablet (120 mg) by mouth every 12 hours   ramipril (ALTACE) 10 MG  "capsule Take 1 capsule (10 mg) by mouth daily   risperiDONE (RISPERDAL) 1 MG tablet Take 1 tablet (1 mg) by mouth daily \   rizatriptan (MAXALT-MLT) 5 MG ODT tab Take 1 tablet (5 mg) by mouth at onset of headache for migraine   syringe, disposable, (BD TUBERCULIN SYRINGE) 1 ML MISC Equipment being ordered: 1 ml tuberculin syringes to be used for Vitamin B12 injections.   tiZANidine (ZANAFLEX) 4 MG tablet TAKE 1 TABLET BY MOUTH THREE TIMES DAILY AS NEEDED   traMADol (ULTRAM) 50 MG tablet Take 1 tablet (50 mg) by mouth every 6 hours as needed for moderate to severe pain   vitamin D3 (CHOLECALCIFEROL) 04477 UNITS capsule Take one capsule every two weeks.     No current facility-administered medications on file prior to visit.     Social History   Substance Use Topics     Smoking status: Never Smoker     Smokeless tobacco: Never Used     Alcohol use No       Family History   Problem Relation Age of Onset     Musculoskeletal Disorder Mother      back     Anxiety Disorder Mother      Colon Polyps Mother      Ulcerative Colitis Mother      and ischemic small intestine, surgery     Hypertension Mother      Breast Cancer Mother      Osteoporosis Mother      Diabetes Mother      Type 2, Diagnosed in 2014     Depression Mother      Takes Cymbalta to help with chronic pain + depx     Thyroid Disease Mother      Hypothyroidism     Obesity Mother      Under much better control latter half of 2015     Musculoskeletal Disorder Father      back     Substance Abuse Father      Hypertension Father      Hyperlipidemia Father      Depression Father      Off meds for many years. Seems \"ok\"     Heart Disease Maternal Grandmother      Heart Disease Maternal Grandfather      Psychotic Disorder Paternal Grandfather      Suicide Paternal Grandfather      Depression Paternal Grandfather      Pediatrician. Committed suicide by pistol in 1990.     Musculoskeletal Disorder Brother      back     Depression Brother      Expressed as anger and " "moodiness     Substance Abuse Sister      Depression Sister      Mental Health Therapist, yet no anti-depressants?     Anxiety Disorder Sister      Mental Health Therapist, yet no anti-anxiety meds?     Other Cancer Other      Bladder Cancer - Fatal     Substance Abuse Brother      Colon Cancer No family hx of      Crohn Disease No family hx of      Anesthesia Reaction No family hx of      Cancer No family hx of      No family history of skin cancer       ROS:  Consitutional: As above  ENT: As above  Respiratory: As above    OBJECTIVE:  /85 (BP Location: Right arm, Patient Position: Sitting, Cuff Size: Adult Large)  Pulse 75  Temp 97.8  F (36.6  C) (Oral)  Resp 18  Ht 6' 5.5\" (1.969 m)  Wt (!) 339 lb 6.4 oz (154 kg)  SpO2 96%  BMI 39.73 kg/m2  GENERAL APPEARANCE: healthy, alert and no distress  EYES: conjunctiva clear  HENT: Both TMs retracted and intact, Rt has large effusion.       Nose and mouth without ulcers, erythema or lesions.  NO tonsillar enlargement erythema or exudates.   NECK: supple, nontender, no lymphadenopathy  RESP: lungs clear to auscultation - no rales, rhonchi or wheezes  CV: regular rates and rhythm, normal S1 S2, no murmur noted  NEURO: awake, alert          ASSESSMENT: Well appearing.    ICD-10-CM    1. Acute otitis media, unspecified otitis media type H66.90 azithromycin (ZITHROMAX Z-OTONIEL) 250 MG tablet   2. Mild intermittent asthma without complication J45.20    3. Immunosuppression (H) D89.9          PLAN:  Lots of rest and fluids.  RTC if any worsening symptoms or if not improving.    Klever Obando PA-C           "

## 2018-12-05 NOTE — TELEPHONE ENCOUNTER
Message from [employer]:  Original authorizing provider: MD Pankaj Dickeycarol SCOTTLopez Pineda would like a refill of the following medications:  traMADol (ULTRAM) 50 MG tablet [Ksenia Lyles MD]    Preferred pharmacy: [facility] -     Comment:  I'm still having periods of intense pain.

## 2018-12-12 ENCOUNTER — OFFICE VISIT (OUTPATIENT)
Dept: FAMILY MEDICINE | Facility: CLINIC | Age: 43
End: 2018-12-12
Payer: COMMERCIAL

## 2018-12-12 VITALS
HEIGHT: 78 IN | WEIGHT: 315 LBS | TEMPERATURE: 97.9 F | HEART RATE: 88 BPM | DIASTOLIC BLOOD PRESSURE: 80 MMHG | RESPIRATION RATE: 20 BRPM | SYSTOLIC BLOOD PRESSURE: 122 MMHG | OXYGEN SATURATION: 98 % | BODY MASS INDEX: 36.45 KG/M2

## 2018-12-12 DIAGNOSIS — G89.29 CHRONIC MIDLINE LOW BACK PAIN WITHOUT SCIATICA: ICD-10-CM

## 2018-12-12 DIAGNOSIS — H66.001 ACUTE SUPPURATIVE OTITIS MEDIA OF RIGHT EAR WITHOUT SPONTANEOUS RUPTURE OF TYMPANIC MEMBRANE, RECURRENCE NOT SPECIFIED: Primary | ICD-10-CM

## 2018-12-12 DIAGNOSIS — M54.50 CHRONIC MIDLINE LOW BACK PAIN WITHOUT SCIATICA: ICD-10-CM

## 2018-12-12 DIAGNOSIS — M45.8 ANKYLOSING SPONDYLITIS OF SACRAL REGION (H): ICD-10-CM

## 2018-12-12 PROCEDURE — 99213 OFFICE O/P EST LOW 20 MIN: CPT | Performed by: PHYSICIAN ASSISTANT

## 2018-12-12 ASSESSMENT — PAIN SCALES - GENERAL: PAINLEVEL: MODERATE PAIN (4)

## 2018-12-12 ASSESSMENT — MIFFLIN-ST. JEOR: SCORE: 2553.45

## 2018-12-12 NOTE — PROGRESS NOTES
SUBJECTIVE:   Joel Pineda is a 43 year old male who presents to clinic today for the following health issues:      ENT Symptoms             Symptoms: cc Present Absent Comment   Fever/Chills   x    Fatigue  x     Muscle Aches  x     Eye Irritation   x    Sneezing   x    Nasal Vitor/Drg  x     Sinus Pressure/Pain   x    Loss of smell   x    Dental pain   x    Sore Throat   x    Swollen Glands   x    Ear Pain/Fullness  x     Cough   x    Wheeze   x    Chest Pain   x    Shortness of breath   x    Rash   x    Other   x      Symptom duration:  about 3 weeks ago   Symptom severity:  No   Treatments tried:  ABX ear drops from  and z-pac from me 12/5   Contacts:  none     Pain and hearing loss in ear improving, still some pressure and tinnitus.  Has infusion as below in 2 days and wondering if ok to have it.       hx asthma . LLOYD, and ankylosing spondy on immunosuppresants.             Allergies   Allergen Reactions     Banana Shortness Of Breath     Pt reports organic Banana is okay.      Nitroglycerin Palpitations     Penicillins Anaphylaxis     Provigil [Modafinil] Shortness Of Breath     headache     Ciprofloxacin Palpitations, Other (See Comments) and Rash     dizziness (versus metronidazole taken at same time)     Gadolinium Hives and Itching     Patient was premedicated for the contrast allergy. He did still have a reaction a few hours after injection. Hives and itching. Dr. Gomez told tech to inform pt he should only have contrast again in the future when premedicated and at a hospital. Not at an outpatient facility.      Dulera Other (See Comments)     Hoarse voice     Dye [Contrast Dye] Other (See Comments) and Hives     Moderate flushing, CT contrast     Levaquin Other (See Comments)     tendonitis     Neurontin [Gabapentin] Hives     Moderate hives     Nortriptyline Hives     Varicella Virus Vaccine Live      Rash     Ciprofloxacin Palpitations     Flagyl [Metronidazole Hcl] Palpitations and Hives      Latex Rash     Metronidazole Palpitations, Other (See Comments) and Rash     dizziness (versus ciprofloxacin taken at same time)     No Clinical Screening - See Comments Rash     Nitrile gloves       Patient Active Problem List   Diagnosis     Major depressive disorder, recurrent episode (H)     Intermittent asthma     Hyperlipidemia LDL goal <130     Hypertension goal BP (blood pressure) < 140/90     Chronic nonallergic rhinitis     History of diverticulitis of colon     Obesity (BMI 30-39.9)     Health Care Home     PE (pulmonary embolism)     Diverticulosis     GERD (gastroesophageal reflux disease)     Anxiety     LLOYD (obstructive sleep apnea)- mild (AHI 11)     Intracranial arachnoid cyst     Facet arthritis of cervical region (H)     Acquired hypothyroidism     Bipolar 2 disorder (H)     Allergic rhinitis, unspecified allergic rhinitis type     Chronic midline low back pain without sciatica     Irritable bowel syndrome with diarrhea     B12 deficiency     Impaired glucose tolerance     Essential hypertension with goal blood pressure less than 140/90     Psychophysiological insomnia     Chronic pain syndrome     Ankylosing spondylitis of sacral region (H)     Morbid obesity due to hypertriglyceridemia (H)     Fatty infiltration of liver     DDD (degenerative disc disease), lumbar     Right knee pain     Acute bilateral low back pain without sciatica     Neck pain, acute       Past Medical History:   Diagnosis Date     Acne      Chest pain     Chest pain, regulated w/BP meds. Clear arteries.     Skin exam, screening for cancer 12/3/2013         Current Outpatient Medications on File Prior to Visit:  acetaminophen 500 MG CAPS Take 500 mg by mouth every 4 hours as needed   albuterol (PROAIR HFA/PROVENTIL HFA/VENTOLIN HFA) 108 (90 BASE) MCG/ACT Inhaler Inhale 2 puffs into the lungs every 4 hours as needed   ALPRAZolam (XANAX XR) 0.5 MG 24 hr tablet Take 1 mg and 0.5 mg on alternating days.   atorvastatin  (LIPITOR) 40 MG tablet TAKE 1 TABLET (40 MG) BY MOUTH DAILY   azithromycin (ZITHROMAX Z-OTONIEL) 250 MG tablet 2 tabs day one then 1 tab qd   celecoxib (CELEBREX) 200 MG capsule TAKE 1 CAPSULE BY MOUTH TWICE DAILY AS NEEDED FORMODERATE PAIN   cetirizine (ZYRTEC) 10 MG tablet Take 1 tablet (10 mg) by mouth At Bedtime   cyanocobalamin (VITAMIN B12) 1000 MCG/ML injection Inject 1 mL (1,000 mcg) into the muscle every 30 days   EPINEPHrine (EPIPEN/ADRENACLICK/OR ANY BX GENERIC EQUIV) 0.3 MG/0.3ML injection 2-pack Inject 0.3 mLs (0.3 mg) into the muscle once as needed for anaphylaxis   fluticasone-salmeterol (ADVAIR DISKUS) 250-50 MCG/DOSE diskus inhaler Inhale 1 puff into the lungs   Ginger, Zingiber officinalis, (GINGER ROOT) 550 MG CAPS capsule Take 550 mg by mouth Up to three times daily   golimumab (SIMPONI ARIA) 50 MG/4ML injection    ketoconazole (NIZORAL) 2 % cream Apply topically 2 times daily   lamoTRIgine (LAMICTAL) 25 MG tablet Take 8 tablets (200 mg) by mouth daily   levothyroxine (SYNTHROID/LEVOTHROID) 75 MCG tablet TAKE 1 TABLET (75 MCG) BY MOUTH EVERY MORNING.   loperamide (IMODIUM) 2 MG capsule Take 2 mg by mouth 4 times daily as needed for diarrhea   metoprolol succinate (TOPROL-XL) 200 MG 24 hr tablet Take 2 tablets (400 mg) by mouth daily   montelukast (SINGULAIR) 10 MG tablet Take 10 mg by mouth   neomycin-polymyxin-hydrocortisone (CORTISPORIN) 3.5-24344-2 otic suspension Place 4 drops into the right ear 4 times daily   omega-3 acid ethyl esters (LOVAZA) 1 g capsule TAKE 2 CAPSULES BY MOUTH TWICE DAILY.   omeprazole 20 MG tablet Take 20 mg by mouth daily    order for DME Respironics REMSTAR 60 Series Auto CPAP 10-12 cm H2O, Wisp nasal mask w/a large cushion and a chinstrap   order for DME SI-loc belt   oxyCODONE IR (ROXICODONE) 5 MG tablet Take 1-2 tablets (5-10 mg) by mouth every 6 hours as needed for pain (maximum 4 tablet(s) per day)   pregabalin (LYRICA) 100 MG capsule Take 1 capsule (100 mg) by mouth  "2 times daily   pseudoePHEDrine (SUDAFED) 120 MG 12 hr tablet Take 1 tablet (120 mg) by mouth every 12 hours   ramipril (ALTACE) 10 MG capsule Take 1 capsule (10 mg) by mouth daily   risperiDONE (RISPERDAL) 1 MG tablet Take 1 tablet (1 mg) by mouth daily \   rizatriptan (MAXALT-MLT) 5 MG ODT tab Take 1 tablet (5 mg) by mouth at onset of headache for migraine   syringe, disposable, (BD TUBERCULIN SYRINGE) 1 ML MISC Equipment being ordered: 1 ml tuberculin syringes to be used for Vitamin B12 injections.   tiZANidine (ZANAFLEX) 4 MG tablet TAKE 1 TABLET BY MOUTH THREE TIMES DAILY AS NEEDED   traMADol (ULTRAM) 50 MG tablet Take 1 tablet (50 mg) by mouth every 6 hours as needed for moderate to severe pain   vitamin D3 (CHOLECALCIFEROL) 98355 UNITS capsule Take one capsule every two weeks.     No current facility-administered medications on file prior to visit.     Social History     Tobacco Use     Smoking status: Never Smoker     Smokeless tobacco: Never Used   Substance Use Topics     Alcohol use: No     Alcohol/week: 0.0 oz     Drug use: No       ROS:   Constitutional: no fevers  ENT: as above  MUSC ank spondy as above    OBJECTIVE:  /80   Pulse 88   Temp 97.9  F (36.6  C) (Oral)   Resp 20   Ht 1.969 m (6' 5.5\")   Wt (!) 154.3 kg (340 lb 3.2 oz)   SpO2 98%   BMI 39.82 kg/m     General:   awake, alert, and cooperative.  NAD.   Head: Normocephalic, atraumatic.  Eyes: Conjunctiva clear,   ENT: . RT Canal clear, TM flat, opaque but less effusion than prev visit, LEFT CANAL clear TM intact and clear  Neuro: Alert and oriented - normal speech.    ASSESSMENT:is improving s/p zpack (which is still in his system),  See plan below.    ICD-10-CM    1. Acute suppurative otitis media of right ear without spontaneous rupture of tympanic membrane, recurrence not specified H66.001    2. Ankylosing spondylitis of sacral region (H) M45.8        PLAN:   Patient will reschedule infusion for late next week or later. Ok to " have it as long as continues to symptomatically improve.  If not improving , will need re-eval prior to infusion.   Advised about symptoms which might herald more serious problems.

## 2018-12-12 NOTE — TELEPHONE ENCOUNTER
"Requested Prescriptions   Pending Prescriptions Disp Refills     celecoxib (CELEBREX) 200 MG capsule [Pharmacy Med Name: Celecoxib Oral Capsule 200 MG] 180 capsule 0     Sig: TAKE 1 CAPSULE BY MOUTH TWICE DAILY AS NEEDED FOR MODERATE PAIN.    NSAID Medications Failed - 12/12/2018 11:32 AM       Failed - Normal AST on file in past 12 months    Recent Labs   Lab Test 09/19/18  1512   AST 59*            Passed - Blood pressure under 140/90 in past 12 months    BP Readings from Last 3 Encounters:   12/12/18 122/80   12/05/18 130/85   11/29/18 110/80                Passed - Normal ALT on file in past 12 months    Recent Labs   Lab Test 09/19/18  1512   ALT 58            Passed - Recent (12 mo) or future (30 days) visit within the authorizing provider's specialty    Patient had office visit in the last 12 months or has a visit in the next 30 days with authorizing provider or within the authorizing provider's specialty.  See \"Patient Info\" tab in inbasket, or \"Choose Columns\" in Meds & Orders section of the refill encounter.             Passed - Patient is age 6-64 years       Passed - Normal CBC on file in past 12 months    Recent Labs   Lab Test 09/19/18  1512   WBC 10.5   RBC 5.04   HGB 15.3   HCT 44.9                   Passed - Normal serum creatinine on file in past 12 months    Recent Labs   Lab Test 09/19/18  1512   CR 0.96             celecoxib (CELEBREX) 200 MG capsule  Last Written Prescription Date:  1/24/18  Last Fill Quantity: 180,  # refills: 1   Last office visit: 12/12/2018 with prescribing provider:  Dr. Lyles   Future Office Visit:   Next 5 appointments (look out 90 days)    Jan 30, 2019  1:20 PM CST  Return Visit with Oneil Baxter MD  [facility] [place] ([facility] [place]) [address]  [place] [address] [phone]           "

## 2018-12-14 RX ORDER — CELECOXIB 200 MG/1
CAPSULE ORAL
Qty: 180 CAPSULE | Refills: 0 | Status: SHIPPED | OUTPATIENT
Start: 2018-12-14 | End: 2019-05-13

## 2018-12-14 NOTE — TELEPHONE ENCOUNTER
Future Office visit:    Next 5 appointments (look out 90 days)    Jan 30, 2019  1:20 PM CST  Return Visit with Oneil Baxter MD  [facility] [place] ([facility]) [address]  [place] [address]  [phone]           Routing refill request to provider for review/approval because:    NSAID Medications Failed did not have following:   Normal AST on file in past 12 months     Tea Adam RN

## 2018-12-17 ENCOUNTER — MYC MEDICAL ADVICE (OUTPATIENT)
Dept: PSYCHIATRY | Facility: CLINIC | Age: 43
End: 2018-12-17

## 2018-12-17 DIAGNOSIS — F33.2 SEVERE EPISODE OF RECURRENT MAJOR DEPRESSIVE DISORDER, WITHOUT PSYCHOTIC FEATURES (H): ICD-10-CM

## 2018-12-18 ENCOUNTER — ALLIED HEALTH/NURSE VISIT (OUTPATIENT)
Dept: PHARMACY | Facility: CLINIC | Age: 43
End: 2018-12-18
Payer: COMMERCIAL

## 2018-12-18 ENCOUNTER — INFUSION THERAPY VISIT (OUTPATIENT)
Dept: INFUSION THERAPY | Facility: CLINIC | Age: 43
End: 2018-12-18
Payer: COMMERCIAL

## 2018-12-18 VITALS
RESPIRATION RATE: 16 BRPM | SYSTOLIC BLOOD PRESSURE: 147 MMHG | HEART RATE: 97 BPM | TEMPERATURE: 98.1 F | DIASTOLIC BLOOD PRESSURE: 85 MMHG | OXYGEN SATURATION: 94 % | WEIGHT: 315 LBS | BODY MASS INDEX: 40.22 KG/M2

## 2018-12-18 DIAGNOSIS — M45.8 ANKYLOSING SPONDYLITIS OF SACRAL REGION (H): Primary | ICD-10-CM

## 2018-12-18 DIAGNOSIS — J45.30 MILD PERSISTENT ASTHMA, UNSPECIFIED WHETHER COMPLICATED: Primary | ICD-10-CM
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
## 2018-12-18 DIAGNOSIS — M45.8 ANKYLOSING SPONDYLITIS OF SACRAL REGION (H): ICD-10-CM

## 2018-12-18 DIAGNOSIS — I10 ESSENTIAL HYPERTENSION WITH GOAL BLOOD PRESSURE LESS THAN 140/90: ICD-10-CM

## 2018-12-18 DIAGNOSIS — G43.919 INTRACTABLE MIGRAINE WITHOUT STATUS MIGRAINOSUS, UNSPECIFIED MIGRAINE TYPE: ICD-10-CM

## 2018-12-18 DIAGNOSIS — J45.31 MILD PERSISTENT ASTHMA WITH ACUTE EXACERBATION: ICD-10-CM

## 2018-12-18 DIAGNOSIS — E55.9 VITAMIN D DEFICIENCY: ICD-10-CM

## 2018-12-18 DIAGNOSIS — F41.8 DEPRESSION WITH ANXIETY: Primary | ICD-10-CM

## 2018-12-18 PROCEDURE — 99207 ZZC NO CHARGE LOS: CPT

## 2018-12-18 PROCEDURE — 96413 CHEMO IV INFUSION 1 HR: CPT | Performed by: INTERNAL MEDICINE

## 2018-12-18 PROCEDURE — 99606 MTMS BY PHARM EST 15 MIN: CPT | Performed by: PHARMACIST

## 2018-12-18 PROCEDURE — 99607 MTMS BY PHARM ADDL 15 MIN: CPT | Performed by: PHARMACIST

## 2018-12-18 RX ORDER — HYDROXYZINE HYDROCHLORIDE 50 MG/1
50 TABLET, FILM COATED ORAL 2 TIMES DAILY
COMMUNITY
Start: 2017-11-08 | End: 2020-06-09

## 2018-12-18 RX ORDER — ASCORBIC ACID 500 MG
500 TABLET ORAL DAILY
COMMUNITY
End: 2020-10-14

## 2018-12-18 RX ORDER — HYDROXYZINE HYDROCHLORIDE 50 MG/1
50 TABLET, FILM COATED ORAL 3 TIMES DAILY PRN
COMMUNITY
End: 2018-12-18

## 2018-12-18 RX ORDER — LAMOTRIGINE 100 MG/1
300 TABLET ORAL DAILY
COMMUNITY
End: 2021-08-18 | Stop reason: SINTOL

## 2018-12-18 ASSESSMENT — PAIN SCALES - GENERAL: PAINLEVEL: MILD PAIN (3)

## 2018-12-18 NOTE — PATIENT INSTRUCTIONS
Recommendations from today's MTM visit:                                                      No medication changes recommended today.  Recommend updated Vitamin D level-  Please complete asthma control test that I will send via [employer]    Next MTM visit: February 12th at Infusion appointment    To schedule another MTM appointment, please call the clinic directly or you may call the MTM scheduling line at [phone] or toll-free at [phone].     My Clinical Pharmacist's contact information:                                                      It was a pleasure talking with you today!  Please feel free to contact me with any questions or concerns you have.      Radha Mcdonald, Pharm.D, [facility]  Medication Therapy Management Pharmacist    You may receive a survey about the MTM services you received.  I would appreciate your feedback to help me serve you better in the future. Please fill it out and return it when you can. Your comments will be anonymous.
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
## 2018-12-18 NOTE — PROGRESS NOTES
Infusion Nursing Note:  Joel Pineda presents today for Kishanyasmeen.   Patient seen by provider today: No   present during visit today: Not Applicable.    Note: N/A.    Intravenous Access:  Peripheral IV placed.    Treatment Conditions:  Biological Infusion Checklist:    ~~~ NOTE: If the patient answers yes to any of the questions below, hold the infusion and contact ordering provider or on-call provider.    1. Have you recently had an elevated temperature, fever, chills, productive cough, coughing for 3 weeks or longer or hemoptysis,  abnormal vital signs, night sweats,  chest pain or have you noticed a decrease in your appetite, unexplained weight loss or fatigue? No  2. Do you have any open wounds or new incisions? No  3. Do you have any recent or upcoming hospitalizations, surgeries or dental procedures? No  4. Do you currently have or recently have had any signs of illness or infection or are you on any antibiotics? No  5. Have you had any new, sudden or worsening abdominal pain? No  6. Have you or anyone in your household received a live vaccination in the past 4 weeks? Please note:  No live vaccines while on biologic/chemotherapy until 6 months after the last treatment.  Patient can receive the flu vaccine (shot only) and the pneumovax.  It is optimal for the patient to get these vaccines mid cycle, but they can be given at any time as long as it is not on the day of the infusion. No  7. Have you recently been diagnosed with any new nervous system diseases (ie. Multiple sclerosis, Guillain Aspen, seizures, neurological changes) or cancer diagnosis? Are you on any form of radiation or chemotherapy? No  8. Are you pregnant or breast feeding or do you have plans of pregnancy in the future? No  9. Have you been having any signs of worsening depression or suicidal ideations?  (benlysta only) No  10. Have there been any other new onset medical symptoms? No    Post Infusion Assessment:  Patient tolerated  infusion without incident.  Site patent and intact, free from redness, edema or discomfort.  No evidence of extravasations.  Access discontinued per protocol.  Biologic Infusion Post Education: Call the triage nurse at your clinic or seek medical attention if you have chills and/or temperature greater than or equal to 100.5, uncontrolled nausea/vomiting, diarrhea, constipation, dizziness, shortness of breath, chest pain, heart palpitations, weakness or any other new or concerning symptoms, questions or concerns.  You cannot have any live virus vaccines prior to or during treatment or up to 6 months post infusion.  If you have an upcoming surgery, medical procedure or dental procedure during treatment, this should be discussed with your ordering physician and your surgeon/dentist.  If you are having any concerning symptom, if you are unsure if you should get your next infusion or wish to speak to a provider before your next infusion, please call your care coordinator or triage nurse at your clinic to notify them so we can adequately serve you.    Discharge Plan:   Patient and/or family verbalized understanding of discharge instructions and all questions answered.  Patient discharged in stable condition accompanied by: self.  Departure Mode: Ambulatory.    Haylie Xavier RN

## 2018-12-19 ENCOUNTER — THERAPY VISIT (OUTPATIENT)
Dept: PHYSICAL THERAPY | Facility: CLINIC | Age: 43
End: 2018-12-19
Payer: COMMERCIAL

## 2018-12-19 DIAGNOSIS — M54.2 NECK PAIN, ACUTE: ICD-10-CM

## 2018-12-19 DIAGNOSIS — M54.50 ACUTE BILATERAL LOW BACK PAIN WITHOUT SCIATICA: ICD-10-CM

## 2018-12-19 DIAGNOSIS — M25.561 RIGHT KNEE PAIN: ICD-10-CM

## 2018-12-19 PROCEDURE — 97110 THERAPEUTIC EXERCISES: CPT | Mod: GP | Performed by: PHYSICAL THERAPIST

## 2018-12-19 PROCEDURE — 97140 MANUAL THERAPY 1/> REGIONS: CPT | Mod: GP | Performed by: PHYSICAL THERAPIST

## 2018-12-22 ENCOUNTER — NURSE TRIAGE (OUTPATIENT)
Dept: NURSING | Facility: CLINIC | Age: 43
End: 2018-12-22

## 2018-12-22 ENCOUNTER — TRANSFERRED RECORDS (OUTPATIENT)
Dept: HEALTH INFORMATION MANAGEMENT | Facility: CLINIC | Age: 43
End: 2018-12-22

## 2018-12-22 NOTE — TELEPHONE ENCOUNTER
"Patient calling stating \"I think I have c-diff.\" Reporting diarrhea started on 12/19/18. X 2 large episodes of loose stools in past 24 hours. Patient reporting left lower quadrant pain intermittent. Afebrile. Patient reporting he was hospitalized in the past for symptoms of diverticulitis. Nausea. Patient has been on 2 antibiotics in past 2 months.   Advised patient to be seen in Emergency Room due to past medical history. Caller verbalized understanding. Denies further questions.    Patient agrees to go into [facility].     Caller verbalized understanding. Denies further questions.    Farida Paniagua RN  [facility]      Reason for Disposition    Abdominal pain  (Exception: Pain clears with each passage of diarrhea stool)    Additional Information    Negative: Shock suspected (e.g., cold/pale/clammy skin, too weak to stand, low BP, rapid pulse)    Negative: Difficult to awaken or acting confused  (e.g., disoriented, slurred speech)    Negative: Sounds like a life-threatening emergency to the triager    Negative: Vomiting also present and worse than the diarrhea    Negative: [1] Blood in stool AND [2] without diarrhea    Negative: [1] SEVERE abdominal pain (e.g., excruciating) AND [2] present > 1 hour    Negative: [1] SEVERE abdominal pain AND [2] age > 60    Negative: [1] Blood in the stool AND [2] moderate or large amount of blood    Negative: Black or tarry bowel movements  (Exception: chronic-unchanged  black-grey bowel movements AND is taking iron pills or Pepto-bismol)    Negative: [1] Drinking very little AND [2] dehydration suspected (e.g., no urine > 12 hours, very dry mouth, very lightheaded)    Negative: Patient sounds very sick or weak to the triager    Negative: [1] SEVERE diarrhea (e.g., 7 or more times / day more than normal) AND [2]  age > 60 years    Negative: [1] Constant abdominal pain AND [2] present > 2 hours    Negative: [1] Fever > 103 F (39.4 C) AND [2] not able to get the " fever down using Fever Care Advice    Negative: [1] SEVERE diarrhea (e.g., 7 or more times / day more than normal) AND [2] present > 24 hours (1 day)    Negative: [1] MODERATE diarrhea (e.g., 4-6 times / day more than normal) AND [2] present > 48 hours (2 days)    Negative: [1] MODERATE diarrhea (e.g., 4-6 times / day more than normal) AND [2] age > 70 years    Negative: Fever > 101 F (38.3 C)    Negative: Fever present > 3 days (72 hours)    Protocols used: DIARRHEA-ADULT-AH

## 2018-12-24 ENCOUNTER — OFFICE VISIT (OUTPATIENT)
Dept: FAMILY MEDICINE | Facility: CLINIC | Age: 43
End: 2018-12-24
Payer: COMMERCIAL

## 2018-12-24 ENCOUNTER — MYC MEDICAL ADVICE (OUTPATIENT)
Dept: FAMILY MEDICINE | Facility: CLINIC | Age: 43
End: 2018-12-24

## 2018-12-24 VITALS
HEART RATE: 82 BPM | OXYGEN SATURATION: 95 % | DIASTOLIC BLOOD PRESSURE: 89 MMHG | SYSTOLIC BLOOD PRESSURE: 137 MMHG | WEIGHT: 315 LBS | BODY MASS INDEX: 36.45 KG/M2 | TEMPERATURE: 97.4 F | HEIGHT: 78 IN

## 2018-12-24 DIAGNOSIS — E66.9 OBESITY (BMI 30-39.9): Chronic | ICD-10-CM

## 2018-12-24 DIAGNOSIS — E03.9 ACQUIRED HYPOTHYROIDISM: Chronic | ICD-10-CM

## 2018-12-24 DIAGNOSIS — E11.9 TYPE 2 DIABETES MELLITUS NOT AT GOAL (H): Primary | ICD-10-CM

## 2018-12-24 DIAGNOSIS — N18.30 TYPE 2 DIABETES MELLITUS WITH STAGE 3 CHRONIC KIDNEY DISEASE, WITHOUT LONG-TERM CURRENT USE OF INSULIN (H): ICD-10-CM

## 2018-12-24 DIAGNOSIS — E78.5 HYPERLIPIDEMIA LDL GOAL <100: ICD-10-CM

## 2018-12-24 DIAGNOSIS — E11.22 TYPE 2 DIABETES MELLITUS WITH STAGE 3 CHRONIC KIDNEY DISEASE, WITHOUT LONG-TERM CURRENT USE OF INSULIN (H): ICD-10-CM

## 2018-12-24 DIAGNOSIS — I10 HYPERTENSION GOAL BP (BLOOD PRESSURE) < 140/90: Chronic | ICD-10-CM

## 2018-12-24 DIAGNOSIS — R73.9 HYPERGLYCEMIA: Primary | ICD-10-CM

## 2018-12-24 PROBLEM — K57.20 DIVERTICULITIS OF LARGE INTESTINE WITH ABSCESS WITHOUT BLEEDING: Status: ACTIVE | Noted: 2017-11-01

## 2018-12-24 LAB
GLUCOSE BLD-MCNC: 77 MG/DL (ref 70–99)
HBA1C MFR BLD: 6.7 % (ref 0–5.6)

## 2018-12-24 PROCEDURE — 99214 OFFICE O/P EST MOD 30 MIN: CPT | Performed by: NURSE PRACTITIONER

## 2018-12-24 PROCEDURE — 83036 HEMOGLOBIN GLYCOSYLATED A1C: CPT | Performed by: NURSE PRACTITIONER

## 2018-12-24 PROCEDURE — 84443 ASSAY THYROID STIM HORMONE: CPT | Performed by: NURSE PRACTITIONER

## 2018-12-24 PROCEDURE — 36415 COLL VENOUS BLD VENIPUNCTURE: CPT | Performed by: NURSE PRACTITIONER

## 2018-12-24 PROCEDURE — 80061 LIPID PANEL: CPT | Performed by: NURSE PRACTITIONER

## 2018-12-24 PROCEDURE — 82043 UR ALBUMIN QUANTITATIVE: CPT | Performed by: NURSE PRACTITIONER

## 2018-12-24 PROCEDURE — 82947 ASSAY GLUCOSE BLOOD QUANT: CPT | Performed by: NURSE PRACTITIONER

## 2018-12-24 RX ORDER — METFORMIN HCL 500 MG
500 TABLET, EXTENDED RELEASE 24 HR ORAL
Qty: 90 TABLET | Refills: 3 | Status: SHIPPED | OUTPATIENT
Start: 2018-12-24 | End: 2019-01-23

## 2018-12-24 RX ORDER — ONDANSETRON 8 MG/1
TABLET, FILM COATED ORAL
COMMUNITY
Start: 2017-12-24 | End: 2019-03-10

## 2018-12-24 ASSESSMENT — MIFFLIN-ST. JEOR: SCORE: 2569.78

## 2018-12-24 NOTE — PROGRESS NOTES
SUBJECTIVE:   Joel Pineda is a 43 year old male who presents to clinic today for the following health issues:      ED/UC Followup:    Facility:  [facility]  Date of visit: 12/22/18  Reason for visit: abdominal pain  Current Status: still having symptoms       Abdominal Pain      Duration: 12/18/18    Description (location/character/radiation): abdominal       Associated flank pain: lower left quad    Intensity:  moderate    Accompanying signs and symptoms: thirst       Fever/Chills: no        Gas/Bloating: YES- gas       Nausea/vomitting: YES- nausea       Diarrhea: YES       Dysuria or Hematuria: no     History (previous similar pain/trauma/previous testing):     Precipitating or alleviating factors:       Pain worse with eating/BM/urination: none       Pain relieved by BM: no     Therapies tried and outcome: tylenol, celebrex, zofran    LMP:  not applicable  3/10 on the pain scale.  BM is more formed, brown, pain is much less today than on Saturday when he was seen in the ER.      Hyperlipidemia Follow-Up      Rate your low fat/cholesterol diet?: fair    Taking statin?  Yes, no muscle aches from statin    Other lipid medications/supplements?:  none    Hypertension Follow-up      Outpatient blood pressures are not being checked.    Low Salt Diet: low salt    Hypothyroidism Follow-up       Since last visit, patient describes the following symptoms: Weightgain- has gained >100# in the last year per patient report, no hair loss, no skin changes, no constipation, no loose stools      Problem list and histories reviewed & adjusted, as indicated.  Additional history: as documented    Patient Active Problem List   Diagnosis     Major depressive disorder, recurrent episode (H)     Intermittent asthma     Hyperlipidemia LDL goal <100     Hypertension goal BP (blood pressure) < 140/90     Chronic nonallergic rhinitis     History of diverticulitis of colon     Obesity (BMI 30-39.9)     Health Care Home     PE  (pulmonary embolism)     Diverticulosis     GERD (gastroesophageal reflux disease)     Anxiety     LLOYD (obstructive sleep apnea)- mild (AHI 11)     Intracranial arachnoid cyst     Facet arthritis of cervical region (H)     Acquired hypothyroidism     Bipolar 2 disorder (H)     Allergic rhinitis, unspecified allergic rhinitis type     Chronic midline low back pain without sciatica     Irritable bowel syndrome with diarrhea     B12 deficiency     Impaired glucose tolerance     Essential hypertension with goal blood pressure less than 140/90     Psychophysiological insomnia     Chronic pain syndrome     Ankylosing spondylitis of sacral region (H)     Morbid obesity due to hypertriglyceridemia (H)     Fatty infiltration of liver     DDD (degenerative disc disease), lumbar     Right knee pain     Acute bilateral low back pain without sciatica     Neck pain, acute     Diverticulitis of large intestine with abscess without bleeding     Hyperglycemia     Type 2 diabetes mellitus with stage 3 chronic kidney disease, without long-term current use of insulin (H)     Past Surgical History:   Procedure Laterality Date     BACK SURGERY  10/07    lumbar discectomy L5-S1     COLONOSCOPY      Note: colonoscopy scheduled with [facility] on Friday, 9/4/15     COSMETIC SURGERY  2012    Nose Exterior - functional     GI SURGERY  August 2013    Sigmoidectomy     HERNIA REPAIR, UMBILICAL  8/23/11    Dr. Evan whiting     INCISION AND DRAINAGE, ABSCESS, COMPLEX  8/23/11    umbilical, Dr. Evan Beavers     LAPAROSCOPIC ASSISTED COLECTOMY LEFT (DESCENDING)  8/15/2013    Procedure: LAPAROSCOPIC ASSISTED COLECTOMY LEFT (DESCENDING);  Laparoscopic Hand Assisted Sigmoid Resection, Mobilization of Splenic Fissure, coloproctoscopy, *Latex Free Room* Anesthesia General with Pain block  ;  Surgeon: Aurora Justice MD;  Location: [facility] OR     NERVE SURGERY  8/18/11    RF ablation @ L3-S1 @ [facility]     RECONSTRUCT NOSE AND SEPTUM (FUNCTIONAL)  10/14/2011  "   Procedure:RECONSTRUCT NOSE AND SEPTUM (FUNCTIONAL); Functional Septorhinoplasty, Turbinate Reduction, ; Surgeon:CEDRIC CUEVAS; Location:[facility] OR     SINUS SURGERY  10/1/01    ethmoidectomy chronic sinusitis       Social History     Tobacco Use     Smoking status: Never Smoker     Smokeless tobacco: Never Used   Substance Use Topics     Alcohol use: No     Alcohol/week: 0.0 oz     Family History   Problem Relation Age of Onset     Musculoskeletal Disorder Mother         back     Anxiety Disorder Mother      Colon Polyps Mother      Ulcerative Colitis Mother         and ischemic small intestine, surgery     Hypertension Mother      Breast Cancer Mother      Osteoporosis Mother      Diabetes Mother         Type 2, Diagnosed in 2014     Depression Mother         Takes Cymbalta to help with chronic pain + depx     Thyroid Disease Mother         Hypothyroidism     Obesity Mother         Under much better control latter half of 2015     Musculoskeletal Disorder Father         back     Substance Abuse Father      Hypertension Father      Hyperlipidemia Father      Depression Father         Off meds for many years. Seems \"ok\"     Heart Disease Maternal Grandmother      Heart Disease Maternal Grandfather      Psychotic Disorder Paternal Grandfather      Suicide Paternal Grandfather      Depression Paternal Grandfather         Pediatrician. Committed suicide by pistol in 1990.     Musculoskeletal Disorder Brother         back     Depression Brother         Expressed as anger and moodiness     Substance Abuse Sister      Depression Sister         Mental Health Therapist, yet no anti-depressants?     Anxiety Disorder Sister         Mental Health Therapist, yet no anti-anxiety meds?     Other Cancer Other         Bladder Cancer - Fatal     Substance Abuse Brother      Colon Cancer No family hx of      Crohn's Disease No family hx of      Anesthesia Reaction No family hx of      Cancer No family hx of         No family history " of skin cancer         Current Outpatient Medications   Medication Sig Dispense Refill     acetaminophen 500 MG CAPS Take 500 mg by mouth every 4 hours as needed 60 capsule      albuterol (PROAIR HFA/PROVENTIL HFA/VENTOLIN HFA) 108 (90 BASE) MCG/ACT Inhaler Inhale 2 puffs into the lungs every 4 hours as needed       ALPRAZolam (XANAX XR) 0.5 MG 24 hr tablet Take 1 mg and 0.5 mg on alternating days. 45 tablet 0     atorvastatin (LIPITOR) 40 MG tablet TAKE 1 TABLET (40 MG) BY MOUTH DAILY 90 tablet 1     celecoxib (CELEBREX) 200 MG capsule TAKE 1 CAPSULE BY MOUTH TWICE DAILY AS NEEDED FOR MODERATE PAIN. 180 capsule 0     cetirizine (ZYRTEC) 10 MG tablet Take 1 tablet (10 mg) by mouth At Bedtime 30 tablet 11     cyanocobalamin (VITAMIN B12) 1000 MCG/ML injection Inject 1 mL (1,000 mcg) into the muscle every 30 days 10 mL 0     EPINEPHrine (EPIPEN/ADRENACLICK/OR ANY BX GENERIC EQUIV) 0.3 MG/0.3ML injection 2-pack Inject 0.3 mLs (0.3 mg) into the muscle once as needed for anaphylaxis 0.6 mL 3     fluticasone-salmeterol (ADVAIR DISKUS) 250-50 MCG/DOSE diskus inhaler Inhale 1 puff into the lungs       Ginger, Zingiber officinalis, (GINGER ROOT) 550 MG CAPS capsule Take 550 mg by mouth Up to three times daily       golimumab (SIMPONI ARIA) 50 MG/4ML injection        hydrOXYzine (ATARAX) 50 MG tablet Take  mg by mouth as needed For anxiety and insomnia       ketoconazole (NIZORAL) 2 % cream Apply topically 2 times daily 60 g 1     lamoTRIgine (LAMICTAL) 100 MG tablet Take 200 mg by mouth daily       levothyroxine (SYNTHROID/LEVOTHROID) 75 MCG tablet TAKE 1 TABLET (75 MCG) BY MOUTH EVERY MORNING. 90 tablet 0     loperamide (IMODIUM) 2 MG capsule Take 2 mg by mouth 4 times daily as needed for diarrhea       metFORMIN (GLUCOPHAGE-XR) 500 MG 24 hr tablet Take 1 tablet (500 mg) by mouth daily (with dinner) 90 tablet 3     metoprolol succinate (TOPROL-XL) 200 MG 24 hr tablet Take 2 tablets (400 mg) by mouth daily 180 tablet 0      montelukast (SINGULAIR) 10 MG tablet Take 10 mg by mouth daily        omega-3 acid ethyl esters (LOVAZA) 1 g capsule TAKE 2 CAPSULES BY MOUTH TWICE DAILY. 360 capsule 1     omeprazole 20 MG tablet Take 20 mg by mouth daily        ondansetron (ZOFRAN) 8 MG tablet        order for [facility] Respironics REMSTAR 60 Series Auto CPAP 9-13 cm H2O, Wisp nasal mask w/a large cushion and a chinstrap       oxyCODONE IR (ROXICODONE) 5 MG tablet Take 1-2 tablets (5-10 mg) by mouth every 6 hours as needed for pain (maximum 4 tablet(s) per day) 35 tablet 0     pregabalin (LYRICA) 100 MG capsule Take 1 capsule (100 mg) by mouth 2 times daily 60 capsule 5     pseudoePHEDrine (SUDAFED) 120 MG 12 hr tablet Take 1 tablet (120 mg) by mouth every 12 hours 28 tablet 0     ramipril (ALTACE) 10 MG capsule Take 1 capsule (10 mg) by mouth daily 90 capsule 1     risperiDONE (RISPERDAL) 1 MG tablet Take 1 tablet (1 mg) by mouth daily \ 60 tablet 0     rizatriptan (MAXALT-MLT) 5 MG ODT tab Take 1 tablet (5 mg) by mouth at onset of headache for migraine 30 tablet 5     rosuvastatin (CRESTOR) 40 MG tablet Take 1 tablet (40 mg) by mouth daily 90 tablet 3     syringe, disposable, (BD TUBERCULIN SYRINGE) 1 ML MISC Equipment being ordered: 1 ml tuberculin syringes to be used for Vitamin B12 injections. 12 each 1     tiZANidine (ZANAFLEX) 4 MG tablet TAKE 1 TABLET BY MOUTH THREE TIMES DAILY AS NEEDED 90 tablet 4     traMADol (ULTRAM) 50 MG tablet Take 1 tablet (50 mg) by mouth every 6 hours as needed for moderate to severe pain 30 tablet 0     vitamin C (ASCORBIC ACID) 500 MG tablet Take 500 mg by mouth daily       vitamin D3 (CHOLECALCIFEROL) 81301 UNITS capsule Take one capsule every two weeks. 24 capsule 1     BP Readings from Last 3 Encounters:   12/24/18 137/89   12/18/18 147/85   12/12/18 122/80    Wt Readings from Last 3 Encounters:   12/24/18 (!) 155.9 kg (343 lb 12.8 oz)   12/18/18 (!) 155.9 kg (343 lb 9.6 oz)   12/12/18 (!) 154.3 kg (340 lb 3.2  "oz)                    Reviewed and updated as needed this visit by clinical staff  Tobacco  Allergies  Meds  Problems  Med Hx  Surg Hx  Fam Hx  Soc Hx        Reviewed and updated as needed this visit by Provider  Tobacco  Allergies  Meds  Problems  Med Hx  Surg Hx  Fam Hx         ROS:  Constitutional, HEENT, cardiovascular, pulmonary, gi and gu systems are negative, except as otherwise noted.    OBJECTIVE:     /89   Pulse 82   Temp 97.4  F (36.3  C) (Oral)   Ht 1.969 m (6' 5.5\")   Wt (!) 155.9 kg (343 lb 12.8 oz)   SpO2 95%   BMI 40.24 kg/m    Body mass index is 40.24 kg/m .  GENERAL: healthy, alert and no distress  EYES: Eyes grossly normal to inspection, PERRL and conjunctivae and sclerae normal  HENT: ear canals and TM's normal, nose and mouth without ulcers or lesions  NECK: no adenopathy, no asymmetry, masses, or scars and thyroid normal to palpation  RESP: lungs clear to auscultation - no rales, rhonchi or wheezes  CV: regular rate and rhythm, normal S1 S2, no S3 or S4, no murmur, click or rub, no peripheral edema and peripheral pulses strong  ABDOMEN: soft, nontender, no hepatosplenomegaly, no masses and bowel sounds normal  MS: no gross musculoskeletal defects noted, no edema  SKIN: no suspicious lesions or rashes  NEURO: Normal strength and tone, mentation intact and speech normal  PSYCH: mentation appears normal, affect normal/bright  LYMPH: no cervical, supraclavicular, axillary, or inguinal adenopathy    Diagnostic Test Results:  No results found. However, due to the size of the patient record, not all encounters were searched. Please check Results Review for a complete set of results.    ASSESSMENT/PLAN:           BMI:   Estimated body mass index is 40.24 kg/m  as calculated from the following:    Height as of this encounter: 1.969 m (6' 5.5\").    Weight as of this encounter: 155.9 kg (343 lb 12.8 oz).   Weight management plan: Discussed healthy diet and exercise " guidelines      1. Hyperglycemia  Checking  Glucose and A1c today.  Patient to follow back with Dr. Lyles if A1c is elevated for management.  - Hemoglobin A1c  - Glucose, whole blood    2. Hypertension goal BP (blood pressure) < 140/90  Controlled but at upper limit of normal.  Low salt diet, weight loss encouraged  - Albumin Random Urine Quantitative with Creat Ratio    3. Obesity (BMI 30-39.9)  Benefits of weight loss reviewed in detail, encouraged him to cut back on the carbohydrates in the diet, consume more fruits and vegetables, drink plenty of water, avoid fruit juices, sodas, get 150 min moderate exercise/week.  Recheck weight in 6 months.    4. Hyperlipidemia LDL goal <130  Low chol, carb diet encouraged, changing to Crestor 40 mg at HS for markedly elevated lipids.  - Lipid Profile (Chol, Trig, HDL, LDL calc)    5. Acquired hypothyroidism  Due for TSh, adjust synthroid accordingly  - TSH WITH FREE T4 REFLEX    6.  Type 2 diabetes mellitus with stage 3 chronic kidney diseasem, without long term current use of insulin (H)  Starting Metformin.  Follow back within 1 month wtiih PCM.  CDE referral placed as well. He is to be checking his sugars daily, reviewed glycemic targets.  Work on weight loss  Regular exercise  See Patient Instructions    JOCELYN Huff [facility]

## 2018-12-24 NOTE — PATIENT INSTRUCTIONS
At [facility], we strive to deliver an exceptional experience to you, every time we see you.  If you receive a survey in the mail, please send us back your thoughts. We really do value your feedback.    Your care team:                            Family Medicine Internal Medicine   MD Houston Frances MD Shantel Branch-Fleming, MD Katya Georgiev PA-C Megan Hill, APRN CNP    Javier Cavazos, MD Pediatrics   Klever Obando, SHERWIN Scherer, MD Radha Camarena APRN CNP   MD Humaira Devlin MD Deborah Mielke, MD Diana Kauffman, APRN [facility]      Clinic hours: Monday - Thursday 7 am-7 pm; Fridays 7 am-5 pm.   Urgent care: Monday - Friday 11 am-9 pm; Saturday and Sunday 9 am-5 pm.  Pharmacy : Monday -Thursday 8 am-8 pm; Friday 8 am-6 pm; Saturday and Sunday 9 am-5 pm.     Clinic: [phone]   Pharmacy: [phone]        Patient Education     High Blood Sugar (Hyperglycemia)     When you have hyperglycemia, drink plenty of water or other sugar-free, caffeine-free liquids.   Too much glucose (sugar) in your blood is called hyperglycemia or high blood sugar. High blood sugar can lead to a dangerous condition called ketoacidosis. In severe cases, it can lead to dehydration and coma.  Possible causes of hyperglycemia    Inadequate treatment plan for diabetes     Being sick    Being under stress    Taking certain medicines, such as steroids    Eating too much food, especially carbohydrates    Being less active than usual    Not taking enough diabetes medicine  Symptoms of hyperglycemia  Hyperglycemia may not cause symptoms. If you do have symptoms, they may include:    Thirst    Frequent need to urinate    Feeling tired    Nausea and vomiting    Itchy, dry skin    Blurry vision    Fast breathing and breath that smells fruity     Weakness    Dizziness    Wounds or skin infections that don t heal    Unexplained weight loss if hyperglycemia lasts for more than  a few days   What you should do  Make sure you do the following:    Check your blood sugar.    Drink plenty of sugar-free, caffeine-free liquids such as water. Don t drink fruit juice.    Check your blood sugar again every 4 hours. If you take insulin or diabetes medicines, follow your sick-day plan for taking medicine. Call your healthcare provider if you are not able to eat.    Check your blood or urine for ketones as directed.    Call your healthcare provider if your blood sugar and ketones do not return to your target range.  Preventing high blood sugar  To help keep your blood sugar from getting too high:    Control stress.    When you're ill, follow your sick-day plan.     Follow your meal plan. Eat only the amount of food on your meal plan    Follow your exercise plan.    Take your insulin or diabetes medicines as directed by your healthcare team. Also test your blood sugar as directed. If the plan is not working for you, discuss it with your healthcare provider.  Other things to do    Carry a medical ID card, a compact USB drive, or wear a medical alert bracelet or necklace. It should say that you have diabetes. It should also say what to do in case you pass out or go into a coma.    Make sure family, friends, and coworkers know the signs of high blood sugar. Tell them what to do if your blood sugar gets very high and you can t help yourself.    Talk to your healthcare team about other things you can do to prevent high blood sugar.   Special note: Drink plenty of sugar-free and caffeine-free liquids when you feel symptoms of hyperglycemia. Call your healthcare provider if you keep having episodes of hyperglycemia.   Date Last Reviewed: 5/1/2016 2000-2018 [employer]. [address] [place] [address], [place] [address]. All rights reserved. This information is not intended as a substitute for professional medical care. Always follow your healthcare professional's instructions.

## 2018-12-25 ENCOUNTER — NURSE TRIAGE (OUTPATIENT)
Dept: NURSING | Facility: CLINIC | Age: 43
End: 2018-12-25

## 2018-12-26 ENCOUNTER — THERAPY VISIT (OUTPATIENT)
Dept: PHYSICAL THERAPY | Facility: CLINIC | Age: 43
End: 2018-12-26
Payer: COMMERCIAL

## 2018-12-26 DIAGNOSIS — M54.50 CHRONIC LOW BACK PAIN: Primary | ICD-10-CM

## 2018-12-26 DIAGNOSIS — G89.29 CHRONIC LOW BACK PAIN: Primary | ICD-10-CM

## 2018-12-26 LAB
CHOLEST SERPL-MCNC: 152 MG/DL
CREAT UR-MCNC: 26 MG/DL
HDLC SERPL-MCNC: 34 MG/DL
LDLC SERPL CALC-MCNC: ABNORMAL MG/DL
MICROALBUMIN UR-MCNC: <5 MG/L
MICROALBUMIN/CREAT UR: NORMAL MG/G CR (ref 0–17)
NONHDLC SERPL-MCNC: 118 MG/DL
TRIGL SERPL-MCNC: 434 MG/DL
TSH SERPL DL<=0.005 MIU/L-ACNC: 2.63 MU/L (ref 0.4–4)

## 2018-12-26 PROCEDURE — 97530 THERAPEUTIC ACTIVITIES: CPT | Mod: GP | Performed by: PHYSICAL THERAPIST

## 2018-12-26 PROCEDURE — 97110 THERAPEUTIC EXERCISES: CPT | Mod: GP | Performed by: PHYSICAL THERAPIST

## 2018-12-26 PROCEDURE — 97161 PT EVAL LOW COMPLEX 20 MIN: CPT | Mod: GP | Performed by: PHYSICAL THERAPIST

## 2018-12-26 PROCEDURE — 97112 NEUROMUSCULAR REEDUCATION: CPT | Mod: GP | Performed by: PHYSICAL THERAPIST

## 2018-12-26 NOTE — TELEPHONE ENCOUNTER
Additional Information    Negative: Shock suspected (e.g., cold/pale/clammy skin, too weak to stand, low BP, rapid pulse)    Negative: Sounds like a life-threatening emergency to the triager    Negative: [1] Nausea or vomiting AND [2] pregnancy < 20 weeks    Negative: Menstrual Period - Missed or Late (i.e., pregnancy suspected)    Negative: Heat exhaustion suspected (i.e., dehydration from heat exposure)    Negative: Motion sickness suspected (i.e., nausea with car, plane, boat, or train travel)    Negative: Anxiety or stress suspected (i.e., nausea with anxiety attacks or stressful situations)    Negative: Traumatic Brain Injury (TBI) suspected    Negative: Vomiting occurs    Negative: Other symptom is present, see that guideline.  (e.g., chest pain, headache, dizziness, abdominal pain, colds, sore throat, etc.).    Negative: Unable to walk, or can only walk with assistance (e.g., requires support)    Negative: Difficulty breathing    Negative: [1] Insulin-dependent diabetes (Type I) AND [2] glucose > 400 mg/dl (22 mmol/l)    Negative: [1] Drinking very little AND [2] dehydration suspected (e.g., no urine > 12 hours, very dry mouth, very lightheaded)    Negative: Patient sounds very sick or weak to the triager    Negative: Fever > 104 F (40 C)    Negative: [1] Fever > 101 F (38.3 C) AND [2] age > 60    Negative: [1] Fever > 101 F (38.3 C) AND [2] bedridden (e.g., nursing home patient, CVA, chronic illness, recovering from surgery)    Negative: [1] Fever > 100.5 F (38.1 C) AND [2] diabetes mellitus or weak immune system (e.g., HIV positive, cancer chemo, splenectomy, organ transplant, chronic steroids)    Negative: Taking any of the following medications: digoxin (Lanoxin), lithium, theophylline, phenytoin (Dilantin)    Negative: Yellowish color of the skin or white of the eye (i.e., jaundice)    Negative: Fever present > 3 days (72 hours)    Negative: Receiving cancer chemotherapy medication    Negative:  "Taking prescription medication that could cause nausea (e.g., narcotics/opiates, antibiotics, OCPs, many others)    Negative: Nausea persists > 1 week    Negative: Alcohol or drug abuse, known or suspected    Negative: Nausea is a chronic symptom (recurrent or ongoing AND present > 4 weeks)    Unexplained nausea  (all triage questions negative)    Answer Assessment - Initial Assessment Questions  1. NAUSEA SEVERITY: \"How bad is the nausea?\" (e.g., mild, moderate, severe; dehydration, weight loss)    - MILD: loss of appetite without change in eating habits    - MODERATE: decreased oral intake without significant weight loss, dehydration, or malnutrition    - SEVERE: inadequate caloric or fluid intake, significant weight loss, symptoms of dehydration      Mild, background  2. ONSET: \"When did the nausea begin?\"      Few days  3. VOMITING: \"Any vomiting?\" If so, ask: \"How many times today?\"      no  4. RECURRENT SYMPTOM: \"Have you had nausea before?\" If so, ask: \"When was the last time?\" \"What happened that time?\"      no  5. CAUSE: \"What do you think is causing the nausea?\"      ?  6. PREGNANCY: \"Is there any chance you are pregnant?\" (e.g., unprotected intercourse, missed birth control pill, broken condom)      no    Protocols used: NAUSEA-ADULT-AH      "

## 2018-12-26 NOTE — PROGRESS NOTES
[facility] Initial Evaluation  Subjective:    Joel Pineda is a 43 year old male with a lumbar condition.  Condition occurred with:  Other reason.  Condition occurred: for unknown reasons.  This is a chronic condition  Patient reports a long history of low back pain since the 1990s. Patient reports missing work at that time due to back pain. Patient reports having spinal surgery in 2007, microdiscectomy, laminectomy. Patient reports having back pain since that time. Patient reports being recently diagnosed with ankylosing spondylitis in April 2018. .    Patient reports pain:  Lumbar spine right and lumbar spine left.  Radiates to:  No radiation.  Pain is described as aching and is constant and reported as 6/10.  Associated symptoms:  Loss of motion, loss of strength and numbness (numbness over bilateral 1st toes). Pain is worse during the day.  Symptoms are exacerbated by bending, lifting, standing, sitting and carrying and relieved by activity/movement.  Since onset symptoms are unchanged.  Special tests:  X-ray.      General health as reported by patient is good.                      Red flags:  None as reported by the patient.                        Objective:  System         Lumbar/SI Evaluation  ROM:    AROM Lumbar:   Flexion:            Finger tips to floor  Ext:                    Moderate loss, painful   Side Bend:        Left:  Hand to mid thigh    Right:  Hand to mid thigh  Rotation:           Left:  WFL    Right:  WFL  Side Glide:        Left:     Right:           Lumbar Myotomes:  Lumbar myotomes: grossly 4+/5 bilaterally.                Lumbar Dermtomes:  normal                Neural Tension/Mobility:  Lumbar:  Normal            Lumbar Provocation:  Lumbar provocation: +prone instability testing L3/4/5.      Spinal Segmental Conclusions:     Level: Hypo noted at T10, T11, T12, L1 and L2      SI joint/Sacrum:        Left positive at:    Thigh thrust  Right positive at:    Thigh  thrust                                                 General     ROS    Assessment/Plan:    Patient is a 43 year old male with lumbar complaints.    Patient has the following significant findings with corresponding treatment plan.                Diagnosis 1:  Low back pain  Pain -  hot/cold therapy, manual therapy, splint/taping/bracing/orthotics, self management, education and home program  Decreased ROM/flexibility - manual therapy, therapeutic exercise, therapeutic activity and home program  Decreased joint mobility - manual therapy, therapeutic exercise, therapeutic activity and home program  Decreased strength - therapeutic exercise, therapeutic activities and home program  Impaired muscle performance - neuro re-education and home program  Decreased function - therapeutic activities and home program  Impaired posture - neuro re-education, therapeutic activities and home program  Instability -  Therapeutic Activity  Therapeutic Exercise  Neuromuscular Re-education  home program    Therapy Evaluation Codes:   1) History comprised of:   Personal factors that impact the plan of care:      None.    Comorbidity factors that impact the plan of care are:      None.     Medications impacting care: None.  2) Examination of Body Systems comprised of:   Body structures and functions that impact the plan of care:      Lumbar spine.   Activity limitations that impact the plan of care are:      Bathing, Bending, Dressing, Jumping, Lifting, Reading/Computer work, Running, Sitting, Sports, Stairs, Standing and Walking.  3) Clinical presentation characteristics are:   Stable/Uncomplicated.  4) Decision-Making    Low complexity using standardized patient assessment instrument and/or measureable assessment of functional outcome.  Cumulative Therapy Evaluation is: Low complexity.    Previous and current functional limitations:  (See Goal Flow Sheet for this information)    Short term and Long term goals: (See Goal Flow Sheet for  this information)     Communication ability:  Patient appears to be able to clearly communicate and understand verbal and written communication and follow directions correctly.  Treatment Explanation - The following has been discussed with the patient:   RX ordered/plan of care  Anticipated outcomes  Possible risks and side effects  This patient would benefit from PT intervention to resume normal activities.   Rehab potential is good.    Frequency:  1 X week, once daily  Duration:  for 6 weeks  Discharge Plan:  Achieve all LTG.  Independent in home treatment program.  Reach maximal therapeutic benefit.    Please refer to the daily flowsheet for treatment today, total treatment time and time spent performing 1:1 timed codes.

## 2018-12-27 RX ORDER — ROSUVASTATIN CALCIUM 40 MG/1
40 TABLET, COATED ORAL DAILY
Qty: 90 TABLET | Refills: 3 | Status: SHIPPED | OUTPATIENT
Start: 2018-12-27 | End: 2019-03-10

## 2018-12-28 ENCOUNTER — MYC MEDICAL ADVICE (OUTPATIENT)
Dept: FAMILY MEDICINE | Facility: CLINIC | Age: 43
End: 2018-12-28

## 2018-12-28 RX ORDER — ALPRAZOLAM 0.5 MG/1
TABLET, EXTENDED RELEASE ORAL
Qty: 45 TABLET | Refills: 0 | Status: SHIPPED | OUTPATIENT
Start: 2018-12-28 | End: 2019-02-12 | Stop reason: DRUGHIGH

## 2018-12-28 NOTE — TELEPHONE ENCOUNTER
Lynne Mohr MD Swanson, Melanie A, RN   Phone Number: [phone]             Yes, could you refill this for him. Thanks!

## 2018-12-28 NOTE — TELEPHONE ENCOUNTER
This writer attempted to contact Tito on 12/28/18      Reason for call triage abdominal pain and left message.      If patient calls back:   ESTEFANI Gibbons RN message has also been sent to patient.

## 2018-12-28 NOTE — TELEPHONE ENCOUNTER
Joel Pineda is a 43 year old male  who calls with abdominal pain. Patient hospitalized on 12/22/18 for abdominal pain and diarrhea and then followed up with Antonieta Kauffman in clinic 12/24/18. Patient states he continues to have intermittent abdominal pain since ED visit and OV.     NURSING ASSESSMENT:  The pain began 7 days ago.    Pain scale (0-10): 6/10  LMP  was  N/A.  The pain is described as dull and shooting and is located LUQ and LLQ, which is without radiation.  Symptom associated with the abdominal pain: nausea and constipation.  Patient has had previous abdominal surgery, including history of sigmoidectomy secondary to diverticulitis.  Pain is aggravated by eating and fatty foods, and relieved by being at rest.  Patient denies vomiting or diarrhea that consists of blood, SOB, chest pain, severe pain - describes abdominal pain at 6/10 today, intermittent, not constant, not currently experiencing severe pain. Had a BM this AM but reports feeling like he is possibly constipated now (had diarrhea at ED visit). Denies weakness, feeling faint, dizziness, confusion, inability to walk, or fever.  Allergies:   Allergies   Allergen Reactions     Banana Shortness Of Breath     Pt reports organic Banana is okay.      Nitroglycerin Palpitations     Penicillins Anaphylaxis     Provigil [Modafinil] Shortness Of Breath     headache     Ciprofloxacin Palpitations, Other (See Comments) and Rash     dizziness (versus metronidazole taken at same time)     Gadolinium Hives and Itching     Patient was premedicated for the contrast allergy. He did still have a reaction a few hours after injection. Hives and itching. Dr. Gomez told tech to inform pt he should only have contrast again in the future when premedicated and at a hospital. Not at an outpatient facility.      Dulera Other (See Comments)     Hoarse voice     Dye [Contrast Dye] Other (See Comments) and Hives     Moderate flushing, CT contrast     Levaquin Other  "(See Comments)     tendonitis     Neurontin [Gabapentin] Hives     Moderate hives     Nortriptyline Hives     Varicella Virus Vaccine Live      Rash     Ciprofloxacin Palpitations     Flagyl [Metronidazole Hcl] Palpitations and Hives     Latex Rash     Metronidazole Palpitations, Other (See Comments) and Rash     dizziness (versus ciprofloxacin taken at same time)     No Clinical Screening - See Comments Rash     Nitrile gloves       MEDICATIONS:   Taking medication(s) as prescribed? Yes  Taking over the counter medication(s)? No  Any medication side effects? No significant side effects    Any barriers to taking medication(s) as prescribed?  No  Medication(s) improving/managing symptoms?  No  Medication reconciliation completed: Yes    NURSING PLAN: Routed to provider Yes    RECOMMENDED DISPOSITION:  To ED, another person to drive - Patient states due to recent ED visit and OV with provider, he does not want to go back to ED. Writer stated that medical advice for this situation is patient should go to ED for evaluation of abdominal pain that has spread from upper left quadrant to the lower left quadrant. Writer advised that if patient experiences any of the bolded symptoms above, he is to go to ED immediately. Patient states understanding. Patient states he understands writer's medical advice. Patient states \"I am going to wait for a couple hours and see how my pain is and will go in if it continues or gets worse\".   Will comply with recommendation: No- Barriers to comply with plan of care see above. Patient educated on emergent symptoms and patient is agreeable to going to ED later if symptoms continue or emergent symptoms occur.  If further questions/concerns or if symptoms do not improve, worsen or new symptoms develop, call your PCP or [facility] as soon as possible.    Guideline used:  Telephone Triage Protocols for Nurses, Fifth Edition, Holly Kearns    Patient is scheduled for a follow-up with " Antonieta Kauffman in clinic on 12/31/18. Patient advised ED and risks of not going to ED but patient is declining at this time, though vocalized understanding of when he would need to seek ED.    Yuliana Solis RN

## 2018-12-31 ENCOUNTER — OFFICE VISIT (OUTPATIENT)
Dept: FAMILY MEDICINE | Facility: CLINIC | Age: 43
End: 2018-12-31
Payer: COMMERCIAL

## 2018-12-31 VITALS
TEMPERATURE: 97.1 F | SYSTOLIC BLOOD PRESSURE: 136 MMHG | HEIGHT: 78 IN | RESPIRATION RATE: 20 BRPM | WEIGHT: 315 LBS | OXYGEN SATURATION: 100 % | DIASTOLIC BLOOD PRESSURE: 86 MMHG | BODY MASS INDEX: 36.45 KG/M2 | HEART RATE: 83 BPM

## 2018-12-31 DIAGNOSIS — I10 HYPERTENSION GOAL BP (BLOOD PRESSURE) < 140/90: Chronic | ICD-10-CM

## 2018-12-31 DIAGNOSIS — E11.8 TYPE 2 DIABETES MELLITUS WITH COMPLICATION, WITHOUT LONG-TERM CURRENT USE OF INSULIN (H): Primary | ICD-10-CM

## 2018-12-31 DIAGNOSIS — E66.01 MORBID OBESITY (H): ICD-10-CM

## 2018-12-31 PROCEDURE — 99214 OFFICE O/P EST MOD 30 MIN: CPT | Performed by: NURSE PRACTITIONER

## 2018-12-31 ASSESSMENT — MIFFLIN-ST. JEOR: SCORE: 2560.7

## 2018-12-31 ASSESSMENT — PAIN SCALES - GENERAL: PAINLEVEL: MILD PAIN (2)

## 2018-12-31 NOTE — PROGRESS NOTES
SUBJECTIVE:   Joel Pineda is a 43 year old male who presents to clinic today for the following health issues:      Diabetes Follow-up      Patient is checking blood sugars: rarely.  Results range from 125 to 130    Diabetic concerns: None     Symptoms of hypoglycemia (low blood sugar): none     Paresthesias (numbness or burning in feet) or sores: Yes toes hurt     Date of last diabetic eye exam: not yet  Thick calluses on heels and wondering what to do for this.  As well, he is concerned about prior diagnosis of Stage 3 CKD which was noted when he was on Fenofibrate.  Once it was d/c'd his eGFR improved.    BP Readings from Last 2 Encounters:   12/31/18 (!) 157/95   12/24/18 137/89     Hemoglobin A1C (%)   Date Value   12/24/2018 6.7 (H)   05/01/2018 5.8 (H)     LDL Cholesterol Calculated (mg/dL)   Date Value   12/24/2018     Cannot estimate LDL when triglyceride exceeds 400 mg/dL   07/03/2018     Cannot estimate LDL when triglyceride exceeds 400 mg/dL     LDL Cholesterol Direct (mg/dL)   Date Value   07/03/2018 84       Diabetes Management Resources    Amount of exercise or physical activity: 2-3 days/week for an average of 15-30 minutes    Problems taking medications regularly: No    Medication side effects: none    Diet: diabetic           Problem list and histories reviewed & adjusted, as indicated.  Additional history: as documented    Patient Active Problem List   Diagnosis     Major depressive disorder, recurrent episode (H)     Intermittent asthma     Hyperlipidemia LDL goal <100     Hypertension goal BP (blood pressure) < 140/90     Chronic nonallergic rhinitis     History of diverticulitis of colon     Obesity (BMI 30-39.9)     Health Care Home     PE (pulmonary embolism)     Diverticulosis     GERD (gastroesophageal reflux disease)     Anxiety     LLOYD (obstructive sleep apnea)- mild (AHI 11)     Intracranial arachnoid cyst     Facet arthritis of cervical region (H)     Acquired hypothyroidism      Bipolar 2 disorder (H)     Allergic rhinitis, unspecified allergic rhinitis type     Chronic midline low back pain without sciatica     Irritable bowel syndrome with diarrhea     B12 deficiency     Impaired glucose tolerance     Essential hypertension with goal blood pressure less than 140/90     Psychophysiological insomnia     Chronic pain syndrome     Ankylosing spondylitis of sacral region (H)     Morbid obesity due to hypertriglyceridemia (H)     Fatty infiltration of liver     DDD (degenerative disc disease), lumbar     Right knee pain     Acute bilateral low back pain without sciatica     Neck pain, acute     Diverticulitis of large intestine with abscess without bleeding     Hyperglycemia     Type 2 diabetes mellitus with complication, without long-term current use of insulin (H)     Past Surgical History:   Procedure Laterality Date     BACK SURGERY  10/07    lumbar discectomy L5-S1     COLONOSCOPY      Note: colonoscopy scheduled with [facility] on Friday, 9/4/15     COSMETIC SURGERY  2012    Nose Exterior - functional     GI SURGERY  August 2013    Sigmoidectomy     HERNIA REPAIR, UMBILICAL  8/23/11    Dr. Evan whiting     INCISION AND DRAINAGE, ABSCESS, COMPLEX  8/23/11    umbilical, Dr. Evan Beavers     LAPAROSCOPIC ASSISTED COLECTOMY LEFT (DESCENDING)  8/15/2013    Procedure: LAPAROSCOPIC ASSISTED COLECTOMY LEFT (DESCENDING);  Laparoscopic Hand Assisted Sigmoid Resection, Mobilization of Splenic Fissure, coloproctoscopy, *Latex Free Room* Anesthesia General with Pain block  ;  Surgeon: Aurora Justice MD;  Location: [facility] OR     NERVE SURGERY  8/18/11    RF ablation @ L3-S1 @ [facility]     RECONSTRUCT NOSE AND SEPTUM (FUNCTIONAL)  10/14/2011    Procedure:RECONSTRUCT NOSE AND SEPTUM (FUNCTIONAL); Functional Septorhinoplasty, Turbinate Reduction, ; Surgeon:CEDRIC CUEVAS; Location:[facility] OR     SINUS SURGERY  10/1/01    ethmoidectomy chronic sinusitis       Social History     Tobacco Use     Smoking status:  "Never Smoker     Smokeless tobacco: Never Used   Substance Use Topics     Alcohol use: No     Alcohol/week: 0.0 oz     Family History   Problem Relation Age of Onset     Musculoskeletal Disorder Mother         back     Anxiety Disorder Mother      Colon Polyps Mother      Ulcerative Colitis Mother         and ischemic small intestine, surgery     Hypertension Mother      Breast Cancer Mother      Osteoporosis Mother      Diabetes Mother         Type 2, Diagnosed in 2014     Depression Mother         Takes Cymbalta to help with chronic pain + depx     Thyroid Disease Mother         Hypothyroidism     Obesity Mother         Under much better control latter half of 2015     Musculoskeletal Disorder Father         back     Substance Abuse Father      Hypertension Father      Hyperlipidemia Father      Depression Father         Off meds for many years. Seems \"ok\"     Heart Disease Maternal Grandmother      Heart Disease Maternal Grandfather      Psychotic Disorder Paternal Grandfather      Suicide Paternal Grandfather      Depression Paternal Grandfather         Pediatrician. Committed suicide by pistol in 1990.     Musculoskeletal Disorder Brother         back     Depression Brother         Expressed as anger and moodiness     Substance Abuse Sister      Depression Sister         Mental Health Therapist, yet no anti-depressants?     Anxiety Disorder Sister         Mental Health Therapist, yet no anti-anxiety meds?     Other Cancer Other         Bladder Cancer - Fatal     Substance Abuse Brother      Colon Cancer No family hx of      Crohn's Disease No family hx of      Anesthesia Reaction No family hx of      Cancer No family hx of         No family history of skin cancer         Current Outpatient Medications   Medication Sig Dispense Refill     acetaminophen 500 MG CAPS Take 500 mg by mouth every 4 hours as needed 60 capsule      albuterol (PROAIR HFA/PROVENTIL HFA/VENTOLIN HFA) 108 (90 BASE) MCG/ACT Inhaler Inhale 2 " puffs into the lungs every 4 hours as needed       ALPRAZolam (XANAX XR) 0.5 MG 24 hr tablet Take 1 mg and 0.5 mg on alternating days. 45 tablet 0     blood glucose monitoring (NO BRAND SPECIFIED) meter device kit Use to test blood sugar 2 times daily or as directed. Include test strips (2 boxes) with 3 refills 1 kit 0     celecoxib (CELEBREX) 200 MG capsule TAKE 1 CAPSULE BY MOUTH TWICE DAILY AS NEEDED FOR MODERATE PAIN. 180 capsule 0     cetirizine (ZYRTEC) 10 MG tablet Take 1 tablet (10 mg) by mouth At Bedtime 30 tablet 11     cyanocobalamin (VITAMIN B12) 1000 MCG/ML injection Inject 1 mL (1,000 mcg) into the muscle every 30 days 10 mL 0     EPINEPHrine (EPIPEN/ADRENACLICK/OR ANY BX GENERIC EQUIV) 0.3 MG/0.3ML injection 2-pack Inject 0.3 mLs (0.3 mg) into the muscle once as needed for anaphylaxis 0.6 mL 3     fluticasone-salmeterol (ADVAIR DISKUS) 250-50 MCG/DOSE diskus inhaler Inhale 1 puff into the lungs       Ginger, Zingiber officinalis, (GINGER ROOT) 550 MG CAPS capsule Take 550 mg by mouth Up to three times daily       golimumab (SIMPONI ARIA) 50 MG/4ML injection        hydrOXYzine (ATARAX) 50 MG tablet Take  mg by mouth as needed For anxiety and insomnia       ketoconazole (NIZORAL) 2 % cream Apply topically 2 times daily 60 g 1     lamoTRIgine (LAMICTAL) 100 MG tablet Take 200 mg by mouth daily       levothyroxine (SYNTHROID/LEVOTHROID) 75 MCG tablet TAKE 1 TABLET (75 MCG) BY MOUTH EVERY MORNING. 90 tablet 0     loperamide (IMODIUM) 2 MG capsule Take 2 mg by mouth 4 times daily as needed for diarrhea       metFORMIN (GLUCOPHAGE-XR) 500 MG 24 hr tablet Take 1 tablet (500 mg) by mouth daily (with dinner) 90 tablet 3     metoprolol succinate (TOPROL-XL) 200 MG 24 hr tablet Take 2 tablets (400 mg) by mouth daily 180 tablet 0     montelukast (SINGULAIR) 10 MG tablet Take 10 mg by mouth daily        omega-3 acid ethyl esters (LOVAZA) 1 g capsule TAKE 2 CAPSULES BY MOUTH TWICE DAILY. 360 capsule 1      omeprazole 20 MG tablet Take 20 mg by mouth daily        ondansetron (ZOFRAN) 8 MG tablet        order for [facility] REMSTAR 60 Series Auto CPAP 9-13 cm H2O, Wisp nasal mask w/a large cushion and a chinstrap       oxyCODONE IR (ROXICODONE) 5 MG tablet Take 1-2 tablets (5-10 mg) by mouth every 6 hours as needed for pain (maximum 4 tablet(s) per day) 35 tablet 0     pregabalin (LYRICA) 100 MG capsule Take 1 capsule (100 mg) by mouth 2 times daily 60 capsule 5     pseudoePHEDrine (SUDAFED) 120 MG 12 hr tablet Take 1 tablet (120 mg) by mouth every 12 hours 28 tablet 0     ramipril (ALTACE) 10 MG capsule Take 1 capsule (10 mg) by mouth daily 90 capsule 1     risperiDONE (RISPERDAL) 1 MG tablet Take 1 tablet (1 mg) by mouth daily \ 60 tablet 0     rizatriptan (MAXALT-MLT) 5 MG ODT tab Take 1 tablet (5 mg) by mouth at onset of headache for migraine 30 tablet 5     rosuvastatin (CRESTOR) 40 MG tablet Take 1 tablet (40 mg) by mouth daily 90 tablet 3     syringe, disposable, (BD TUBERCULIN SYRINGE) 1 ML MISC Equipment being ordered: 1 ml tuberculin syringes to be used for Vitamin B12 injections. 12 each 1     tiZANidine (ZANAFLEX) 4 MG tablet TAKE 1 TABLET BY MOUTH THREE TIMES DAILY AS NEEDED 90 tablet 4     traMADol (ULTRAM) 50 MG tablet Take 1 tablet (50 mg) by mouth every 6 hours as needed for moderate to severe pain 30 tablet 0     vitamin C (ASCORBIC ACID) 500 MG tablet Take 500 mg by mouth daily       vitamin D3 (CHOLECALCIFEROL) 48914 UNITS capsule Take one capsule every two weeks. 24 capsule 1     BP Readings from Last 3 Encounters:   12/31/18 136/86   12/24/18 137/89   12/18/18 147/85    Wt Readings from Last 3 Encounters:   12/31/18 (!) 155 kg (341 lb 12.8 oz)   12/24/18 (!) 155.9 kg (343 lb 12.8 oz)   12/18/18 (!) 155.9 kg (343 lb 9.6 oz)                    Reviewed and updated as needed this visit by clinical staff  Tobacco  Allergies  Meds  Med Hx  Surg Hx  Fam Hx  Soc Hx      Reviewed and updated as  "needed this visit by Provider         ROS:  Constitutional, HEENT, cardiovascular, pulmonary, gi and gu systems are negative, except as otherwise noted.    OBJECTIVE:     /86   Pulse 83   Temp 97.1  F (36.2  C) (Oral)   Resp 20   Ht 1.969 m (6' 5.5\")   Wt (!) 155 kg (341 lb 12.8 oz)   SpO2 100%   BMI 40.01 kg/m    Body mass index is 40.01 kg/m .  GENERAL: healthy, alert and no distress  EYES: Eyes grossly normal to inspection, PERRL and conjunctivae and sclerae normal  HENT: ear canals and TM's normal, nose and mouth without ulcers or lesions  NECK: no adenopathy, no asymmetry, masses, or scars and thyroid normal to palpation  RESP: lungs clear to auscultation - no rales, rhonchi or wheezes  CV: regular rate and rhythm, normal S1 S2, no S3 or S4, no murmur, click or rub, no peripheral edema and peripheral pulses strong  ABDOMEN: soft, nontender, no hepatosplenomegaly, no masses and bowel sounds normal  MS: no gross musculoskeletal defects noted, no edema  SKIN: no suspicious lesions or rashes  NEURO: Normal strength and tone, mentation intact and speech normal  BACK: no CVA tenderness, no paralumbar tenderness  PSYCH: mentation appears normal, affect normal/bright  LYMPH: normal ant/post cervical, supraclavicular nodes    Diagnostic Test Results:  none     ASSESSMENT/PLAN:         BMI:   Estimated body mass index is 40.01 kg/m  as calculated from the following:    Height as of this encounter: 1.969 m (6' 5.5\").    Weight as of this encounter: 155 kg (341 lb 12.8 oz).   Weight management plan: Discussed healthy diet and exercise guidelines      1. Type 2 diabetes mellitus with complication, without long-term current use of insulin (H)  Doing well on 500 mg Metformin daily and he is to continue at this dose for now.  He is referred to CDE for diet instruction and we discussed benefits of regular exercise to help keep his sugars in good control, complications of poorly controlled diabetes.     2. Morbid " obesity due to hypertriglyceridemia (H)  Benefits of weight loss reviewed in detail, encouraged him to cut back on the carbohydrates in the diet, consume more fruits and vegetables, drink plenty of water, avoid fruit juices, sodas, get 150 min moderate exercise/week.  Recheck weight in 6 months.      3. Hypertension goal BP (blood pressure) < 140/90  At goal, low salt diet advised.      Work on weight loss  Regular exercise  See Patient Instructions    JOCELYN Huff [facility]

## 2018-12-31 NOTE — PATIENT INSTRUCTIONS
Patient Education     Your Diabetes Foot Care Program    Every day you depend on your feet to keep you moving. But when you have diabetes, your feet need special care. Even a small foot problem can become very serious. So don t take your feet for granted. By working with your diabetes healthcare team, you can learn how to protect your feet and keep them healthy.  Evaluating your feet  An evaluation helps your healthcare provider check the condition of your feet. The evaluation includes a review of your diabetes history and overall health. It may also include a foot exam, X-rays, or other tests. These can help show problems beneath the skin that you can t see or feel.  Medical history  You will be asked about your overall health and any history of foot problems. You ll also discuss your diabetes history, such as whether your blood sugar level has changed over time. It also includes questions about sensations of pain, tingling, pins and needles, or numbness. Your healthcare provider will also want to know if you have high blood pressure and heart disease, or if you smoke. Be sure to mention any medicines (including over-the-counter), supplements, or herbal remedies you take.  Foot exam  A foot exam checks the condition of different parts of your foot. First, your skin and nails are examined for any signs of infection. Blood flow is checked by feeling for the pulses in each foot. You may also have tests to study the nerves in the foot. These include using a small filament (wire) to see how sensitive your feet are. In certain cases, you will be asked to walk a short distance to check for bone, joint, and muscle problems.  Diagnostic tests  If needed, your healthcare provider will suggest certain tests to learn more about your feet. These include:    Doppler tests to measure blood flow in the feet and lower leg.    X-rays, which can show bone or joint problems.    Other imaging tests, such as an MRI (magnetic resonance  imaging), bone scan, and CT (computed tomography) scan. These can help show bone infections.    Other tests, such as vascular tests, which study the blood flow in your feet and legs. You may also have nerve studies to learn how sensitive your feet are.  Creating a foot care program  Based on the evaluation, your healthcare provider will create a foot care program for you. Your program may be as simple as starting a daily self-care routine and changing the types of shoes your wear. It may also involve treating minor foot problems, such as a corn or blister. In some cases, surgery will be needed to treat an infection or mechanical problems, such as hammer toes.  Preventing problems  When you have diabetes, it s easier to prevent problems than to treat them later on. So see your healthcare team for regular checkups and foot care. Your healthcare team can also help you learn more about caring for your feet at home. For example, you may be told to avoid walking barefoot. Or you may be told that special footwear is needed to protect your feet.  Have regular checkups  Foot problems can develop quickly. So be sure to follow your healthcare team s schedule for regular checkups. During office visits, take off your shoes and socks as soon as you get in the exam room. Ask your healthcare provider to examine your feet for problems. This will make it easier to find and treat small skin irritations before they get worse. Regular checkups can also help keep track of the blood flow and feeling in your feet. If you have neuropathy (lack of feeling in your feet), you will need to have checkups more often.  Learn about self-care  The more you know about diabetes and your feet, the easier it will be to prevent problems. Members of your healthcare team can teach you how to inspect your feet and teach you to look for warning signs. They can also give you other foot care tips. During office visits, be sure to ask any questions you have.   Date Last Reviewed: 7/1/2016 2000-2018 [employer]. [address] [place] [address], [place] [address]. All rights reserved. This information is not intended as a substitute for professional medical care. Always follow your healthcare professional's instructions.           Patient Education     Diet: Diabetes  Food is an important tool that you can use to control diabetes and stay healthy. Eating well-balanced meals in the correct amounts will help you control your blood glucose levels and prevent low blood sugar reactions. It will also help you reduce the health risks of diabetes. There is no one specific diet that is right for everyone with diabetes. But there are general guidelines to follow. A registered dietitian (RD) will create a tailored diet approach that s just right for you. He or she will also help you plan healthy meals and snacks. If you have any questions, call your dietitian for advice.     Guidelines for success  Talk with your healthcare provider before starting a diabetes diet or weight loss program. If you haven't talked with a dietitian yet, ask your provider for a referral. The following guidelines can help you succeed:    Select foods from the 6 food groups below. Your dietitian will help you find food choices within each group. He or she will also show you serving sizes and how many servings you can have at each meal.  ? Grains, beans, and starchy vegetables  ? Vegetables  ? Fruit  ? Milk or yogurt  ? Meat, poultry, fish, or tofu  ? Healthy fats    Check your blood sugar levels as directed by your provider. Take any medicine as prescribed by your provider.    Learn to read food labels and pick the right portion sizes.    Eat only the amount of food in your meal plan. Eat about the same amount of food at regular times each day. Don t skip meals. Eat meals 4 to 5 hours apart, with snacks in between.    Limit alcohol. It raises blood sugar levels. Drink water or calorie-free diet drinks  that use safe sweeteners.    Eat less fat to help lower your risk of heart disease. Use nonfat or low-fat dairy products and lean meats. Avoid fried foods. Use cooking oils that are unsaturated, such as olive, canola, or peanut oil.    Talk with your dietitian about safe sugar substitutes.    Avoid added salt. It can contribute to high blood pressure, which can cause heart disease. People with diabetes already have a risk of high blood pressure and heart disease.    Stay at a healthy weight. If you need to lose weight, cut down on your portion sizes. But don t skip meals. Exercise is an important part of any weight management program. Talk with your provider about an exercise program that s right for you.    For more information about the best diet plan for you, talk with a registered dietitian (RD). To find an RD in your area, contact:  ? Academy of Nutrition and Dietetics www.eatright.org  ? The American Diabetes Association 107-656-1257 www.diabetes.org  Date Last Reviewed: 8/1/2016 2000-2018 [employer]. [address] [place] [address], [place] [address]. All rights reserved. This information is not intended as a substitute for professional medical care. Always follow your healthcare professional's instructions.

## 2019-01-02 ENCOUNTER — MYC MEDICAL ADVICE (OUTPATIENT)
Dept: FAMILY MEDICINE | Facility: CLINIC | Age: 44
End: 2019-01-02

## 2019-01-02 NOTE — TELEPHONE ENCOUNTER
Recommended sooner OV than waiting for scheduled appt with PCP on 1/23/19. Should be seen sooner for intermittent symptoms of blurry vision, extreme fatigue, and worsening neuropathy, despite normal fasting blood sugar readings. Advised be seen as soon as able, openings at clinic tomorrow. Call to schedule appt. Call and speak with nurse if has worsening or severe symptoms, as mentioned in MyChart message.    Dianne Henderson RN  [facility] Triage

## 2019-01-02 NOTE — TELEPHONE ENCOUNTER
Per chart review, patient read writer's [employer] message and has scheduled an appointment with Dr. Renteria for tomorrow at 8:00 am.    Dianne Henderson RN  [facility] Triage

## 2019-01-03 ENCOUNTER — OFFICE VISIT (OUTPATIENT)
Dept: FAMILY MEDICINE | Facility: CLINIC | Age: 44
End: 2019-01-03
Payer: MEDICARE

## 2019-01-03 ENCOUNTER — TELEPHONE (OUTPATIENT)
Dept: FAMILY MEDICINE | Facility: CLINIC | Age: 44
End: 2019-01-03

## 2019-01-03 VITALS
HEART RATE: 95 BPM | SYSTOLIC BLOOD PRESSURE: 130 MMHG | DIASTOLIC BLOOD PRESSURE: 96 MMHG | WEIGHT: 315 LBS | BODY MASS INDEX: 36.45 KG/M2 | OXYGEN SATURATION: 98 % | TEMPERATURE: 95.6 F | HEIGHT: 78 IN

## 2019-01-03 DIAGNOSIS — E55.9 VITAMIN D DEFICIENCY: ICD-10-CM

## 2019-01-03 DIAGNOSIS — R53.83 OTHER FATIGUE: ICD-10-CM

## 2019-01-03 DIAGNOSIS — Z13.89 SCREENING FOR DIABETIC PERIPHERAL NEUROPATHY: ICD-10-CM

## 2019-01-03 DIAGNOSIS — G62.9 NEUROPATHY: Primary | ICD-10-CM

## 2019-01-03 DIAGNOSIS — I10 ESSENTIAL HYPERTENSION WITH GOAL BLOOD PRESSURE LESS THAN 140/90: Chronic | ICD-10-CM

## 2019-01-03 DIAGNOSIS — E11.8 TYPE 2 DIABETES MELLITUS WITH COMPLICATION, WITHOUT LONG-TERM CURRENT USE OF INSULIN (H): ICD-10-CM

## 2019-01-03 LAB
ALBUMIN SERPL-MCNC: 4.3 G/DL (ref 3.4–5)
ALP SERPL-CCNC: 106 U/L (ref 40–150)
ALT SERPL W P-5'-P-CCNC: 67 U/L (ref 0–70)
ANION GAP SERPL CALCULATED.3IONS-SCNC: 9 MMOL/L (ref 3–14)
AST SERPL W P-5'-P-CCNC: 73 U/L (ref 0–45)
BILIRUB SERPL-MCNC: 0.5 MG/DL (ref 0.2–1.3)
BUN SERPL-MCNC: 12 MG/DL (ref 7–30)
CALCIUM SERPL-MCNC: 9.6 MG/DL (ref 8.5–10.1)
CHLORIDE SERPL-SCNC: 104 MMOL/L (ref 94–109)
CK SERPL-CCNC: 131 U/L (ref 30–300)
CO2 SERPL-SCNC: 25 MMOL/L (ref 20–32)
CREAT SERPL-MCNC: 0.9 MG/DL (ref 0.66–1.25)
DEPRECATED CALCIDIOL+CALCIFEROL SERPL-MC: 38 UG/L (ref 20–75)
ERYTHROCYTE [DISTWIDTH] IN BLOOD BY AUTOMATED COUNT: 13.4 % (ref 10–15)
FOLATE SERPL-MCNC: 55.3 NG/ML
GFR SERPL CREATININE-BSD FRML MDRD: >90 ML/MIN/{1.73_M2}
GLUCOSE SERPL-MCNC: 110 MG/DL (ref 70–99)
HCT VFR BLD AUTO: 46.8 % (ref 40–53)
HGB BLD-MCNC: 15.8 G/DL (ref 13.3–17.7)
LACTATE BLD-SCNC: 1.8 MMOL/L (ref 0.7–2)
MCH RBC QN AUTO: 30.2 PG (ref 26.5–33)
MCHC RBC AUTO-ENTMCNC: 33.8 G/DL (ref 31.5–36.5)
MCV RBC AUTO: 89 FL (ref 78–100)
PLATELET # BLD AUTO: 244 10E9/L (ref 150–450)
POTASSIUM SERPL-SCNC: 4.1 MMOL/L (ref 3.4–5.3)
PROT SERPL-MCNC: 7.9 G/DL (ref 6.8–8.8)
RBC # BLD AUTO: 5.24 10E12/L (ref 4.4–5.9)
SODIUM SERPL-SCNC: 138 MMOL/L (ref 133–144)
VIT B12 SERPL-MCNC: 584 PG/ML (ref 193–986)
WBC # BLD AUTO: 8.6 10E9/L (ref 4–11)

## 2019-01-03 PROCEDURE — 82306 VITAMIN D 25 HYDROXY: CPT | Performed by: FAMILY MEDICINE

## 2019-01-03 PROCEDURE — 99207 C FOOT EXAM  NO CHARGE: CPT | Performed by: FAMILY MEDICINE

## 2019-01-03 PROCEDURE — 82746 ASSAY OF FOLIC ACID SERUM: CPT | Performed by: FAMILY MEDICINE

## 2019-01-03 PROCEDURE — 82550 ASSAY OF CK (CPK): CPT | Performed by: FAMILY MEDICINE

## 2019-01-03 PROCEDURE — 80053 COMPREHEN METABOLIC PANEL: CPT | Performed by: FAMILY MEDICINE

## 2019-01-03 PROCEDURE — 82607 VITAMIN B-12: CPT | Performed by: FAMILY MEDICINE

## 2019-01-03 PROCEDURE — 85027 COMPLETE CBC AUTOMATED: CPT | Performed by: FAMILY MEDICINE

## 2019-01-03 PROCEDURE — 36415 COLL VENOUS BLD VENIPUNCTURE: CPT | Performed by: FAMILY MEDICINE

## 2019-01-03 PROCEDURE — 99214 OFFICE O/P EST MOD 30 MIN: CPT | Performed by: FAMILY MEDICINE

## 2019-01-03 PROCEDURE — 83605 ASSAY OF LACTIC ACID: CPT | Performed by: FAMILY MEDICINE

## 2019-01-03 RX ORDER — PREGABALIN 150 MG/1
150 CAPSULE ORAL 2 TIMES DAILY
Qty: 60 CAPSULE | Refills: 1 | Status: SHIPPED | OUTPATIENT
Start: 2019-01-03 | End: 2019-02-26

## 2019-01-03 ASSESSMENT — MIFFLIN-ST. JEOR: SCORE: 2542.56

## 2019-01-03 ASSESSMENT — ANXIETY QUESTIONNAIRES
GAD7 TOTAL SCORE: 18
6. BECOMING EASILY ANNOYED OR IRRITABLE: NEARLY EVERY DAY
2. NOT BEING ABLE TO STOP OR CONTROL WORRYING: MORE THAN HALF THE DAYS
IF YOU CHECKED OFF ANY PROBLEMS ON THIS QUESTIONNAIRE, HOW DIFFICULT HAVE THESE PROBLEMS MADE IT FOR YOU TO DO YOUR WORK, TAKE CARE OF THINGS AT HOME, OR GET ALONG WITH OTHER PEOPLE: EXTREMELY DIFFICULT
5. BEING SO RESTLESS THAT IT IS HARD TO SIT STILL: SEVERAL DAYS
3. WORRYING TOO MUCH ABOUT DIFFERENT THINGS: NEARLY EVERY DAY
7. FEELING AFRAID AS IF SOMETHING AWFUL MIGHT HAPPEN: NEARLY EVERY DAY
1. FEELING NERVOUS, ANXIOUS, OR ON EDGE: NEARLY EVERY DAY

## 2019-01-03 ASSESSMENT — PATIENT HEALTH QUESTIONNAIRE - PHQ9
SUM OF ALL RESPONSES TO PHQ QUESTIONS 1-9: 17
5. POOR APPETITE OR OVEREATING: NEARLY EVERY DAY
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
## 2019-01-03 NOTE — PATIENT INSTRUCTIONS
Labs today. I will notify you of results and recommendations when I receive them.     I recommend increasing dose of Lyrica to 150mg twice daily to control symptoms.

## 2019-01-03 NOTE — TELEPHONE ENCOUNTER
Reason for call:  Medication   If this is a refill request, has the caller requested the refill from the pharmacy already? No  Will the patient be using a [facility]? No  Name of the pharmacy and phone number for the current request: [facility] [phone]    Name of the medication requested: Lyrica 150mg twice daily    Other request: calling to request for patient  As this is a new strength they don't have on file    Phone number to reach patient:      Best Time:      Can we leave a detailed message on this number?

## 2019-01-04 ASSESSMENT — ANXIETY QUESTIONNAIRES: GAD7 TOTAL SCORE: 18

## 2019-01-04 ASSESSMENT — ASTHMA QUESTIONNAIRES: ACT_TOTALSCORE: 22

## 2019-01-04 NOTE — TELEPHONE ENCOUNTER
This was signed for faxing at visit time.  Please check faxes to see if it was sent and refax. If not found, please call script as entered in the record from 1/3/19.  PSK

## 2019-01-04 NOTE — RESULT ENCOUNTER NOTE
Your vitamin D level is normal.  Continue your current vitamin D intake in diet and supplements.    Your B12 level is in the appropriate range.  Continue your 28 day injection schedule.  Your folic acid level is at a good level for you as well.  Your blood sugar is under much better control with your dietary changes and medication.  (non-fasting level at 110).    Kidney function is normal.  Liver testing shows one elevated level.  This may be due to medication.  If it not at a concerning level for you and will be periodically monitored by our office and Dr. Baxter as well.  The testing for muscle injury (CK) or toxin accumulation (lactic acid) related to your medications is normal/negative. Your blood cell counts are normal.  I would recommend you continue the metformin and increase the dose of the lyrica as discussed at your visit.  Some of the symptoms may be related to the improvement in your blood sugar and your reaction to the lower blood sugar levels.  Your body will adjust to these more normal blood sugars as you continue your treatments.  I would recommend a follow up appointment in 4-6 weeks, sooner if symptoms persist or worsen.    Please call or MyChart message me if you have any questions.    JAELYN

## 2019-01-04 NOTE — TELEPHONE ENCOUNTER
I see that rx was prescribed yesterday. Did this get faxed? Pt says pharmacy did not receive it      Lora Wright MA

## 2019-01-06 ENCOUNTER — MYC REFILL (OUTPATIENT)
Dept: FAMILY MEDICINE | Facility: CLINIC | Age: 44
End: 2019-01-06

## 2019-01-06 DIAGNOSIS — I10 ESSENTIAL HYPERTENSION WITH GOAL BLOOD PRESSURE LESS THAN 140/90: Chronic | ICD-10-CM

## 2019-01-07 NOTE — TELEPHONE ENCOUNTER
"Requested Prescriptions   Pending Prescriptions Disp Refills     metoprolol succinate ER (TOPROL-XL) 200 MG 24 hr tablet 180 tablet 0     Sig: Take 2 tablets (400 mg) by mouth daily    Beta-Blockers Protocol Failed - 1/7/2019  8:23 AM       Failed - Blood pressure under 140/90 in past 12 months    BP Readings from Last 3 Encounters:   01/03/19 (!) 130/96   12/31/18 136/86   12/24/18 137/89                Passed - Patient is age 6 or older       Passed - Recent (12 mo) or future (30 days) visit within the authorizing provider's specialty    Patient had office visit in the last 12 months or has a visit in the next 30 days with authorizing provider or within the authorizing provider's specialty.  See \"Patient Info\" tab in inbasket, or \"Choose Columns\" in Meds & Orders section of the refill encounter.             Passed - Medication is active on med list        metoprolol succinate (TOPROL-XL) 200 MG 24 hr tablet  Last Written Prescription Date:  10/2/18  Last Fill Quantity: 180,  # refills: 0  Last office visit: 1/3/2019 with prescribing provider:  Dr. Lyles   Future Office Visit:   Next 5 appointments (look out 90 days)    Jan 23, 2019  9:00 AM CST  Kal Taylor with Ksenia Lyles MD  [facility] [address] [place] [phone]   Jan 30, 2019  1:20 PM CST  Return Visit with Oneil Baxter MD  [facility] ([facility]) [address] [place] [phone] [phone]   Feb 12, 2019  2:00 PM CST  SHORT with Radha Mcdonald [facility] ([facility]) [address] [place] [phone]           "

## 2019-01-09 RX ORDER — METOPROLOL SUCCINATE 200 MG/1
400 TABLET, EXTENDED RELEASE ORAL DAILY
Qty: 180 TABLET | Refills: 1 | Status: SHIPPED | OUTPATIENT
Start: 2019-01-09 | End: 2019-06-23

## 2019-01-09 NOTE — TELEPHONE ENCOUNTER
Prescription approved per [facility] Refill Protocol.      Martell Chaves RN, BSN

## 2019-01-11 ENCOUNTER — MYC REFILL (OUTPATIENT)
Dept: FAMILY MEDICINE | Facility: CLINIC | Age: 44
End: 2019-01-11

## 2019-01-11 ENCOUNTER — MYC REFILL (OUTPATIENT)
Dept: PHARMACY | Facility: CLINIC | Age: 44
End: 2019-01-11

## 2019-01-11 DIAGNOSIS — E03.9 ACQUIRED HYPOTHYROIDISM: ICD-10-CM

## 2019-01-11 DIAGNOSIS — E78.1 HYPERTRIGLYCERIDEMIA: ICD-10-CM

## 2019-01-11 DIAGNOSIS — M45.8 ANKYLOSING SPONDYLITIS OF SACRAL REGION (H): ICD-10-CM

## 2019-01-11 DIAGNOSIS — M51.369 DDD (DEGENERATIVE DISC DISEASE), LUMBAR: ICD-10-CM

## 2019-01-11 RX ORDER — OMEGA-3-ACID ETHYL ESTERS 1 G/1
CAPSULE, LIQUID FILLED ORAL
Qty: 360 CAPSULE | Refills: 1 | Status: SHIPPED | OUTPATIENT
Start: 2019-01-11 | End: 2019-02-11

## 2019-01-11 RX ORDER — LEVOTHYROXINE SODIUM 75 UG/1
TABLET ORAL
Qty: 90 TABLET | Refills: 0 | Status: SHIPPED | OUTPATIENT
Start: 2019-01-11 | End: 2019-04-15

## 2019-01-11 NOTE — TELEPHONE ENCOUNTER
Prescription approved per [facility] Refill Protocol.      Martell Chaves RN, BSN

## 2019-01-11 NOTE — TELEPHONE ENCOUNTER
Requested Prescriptions   Pending Prescriptions Disp Refills     oxyCODONE (ROXICODONE) 5 MG tablet  Last Written Prescription Date:  11/13/18  Last Fill Quantity: 35 tablet,  # refills: 0   Last office visit: 1/3/2019 with prescribing provider:  Dr. Lyles   Future Office Visit:   Next 5 appointments (look out 90 days)    Jan 23, 2019  9:00 AM CST  Almast Long with Ksenia Lyles MD  [facility] ([facility]) [address] [place] [phone]  [phone]   Jan 30, 2019  1:20 PM CST  Return Visit with Oneil Baxter MD  [facility] ([facility]) [address] [place] [phone]   Feb 12, 2019  2:00 PM CST  SHORT with Radha Mcdonald [facility] ([facility] [address] [place] [address] [address] [place] [phone] [phone]        Routing refill request to provider for review/approval because:  Drug not on the [facility], [facility] or [facility] refill protocol or controlled substance   35 tablet 0     Sig: Take 1-2 tablets (5-10 mg) by mouth every 6 hours as needed for pain (maximum 4 tablet(s) per day)    There is no refill protocol information for this order

## 2019-01-11 NOTE — TELEPHONE ENCOUNTER
Next 5 appointments (look out 90 days)    Jan 23, 2019  9:00 AM CST  MyChart Long with Ksenia Lyles MD  [facility] ([facility]) [address] [place] [phone]  [phone]   Jan 30, 2019  1:20 PM CST  Return Visit with Oneil Baxter MD  [facility] ([facility]) [address] [place] [phone]  [phone]   Feb 12, 2019  2:00 PM CST  SHORT with Radha Mcdonald LAURYN  [facility] ([facility]) [address] [place] [phone]           Routing refill request to provider for review/approval because:  Drug not on the [facility], [facility] or  Health refill protocol or controlled substance  Provider not registered on [facility]  Tea Adam RN

## 2019-01-11 NOTE — TELEPHONE ENCOUNTER
Prescription approved per [facility] Refill Protocol.  Tea Adam RN

## 2019-01-13 RX ORDER — OXYCODONE HYDROCHLORIDE 5 MG/1
5-10 TABLET ORAL EVERY 6 HOURS PRN
Qty: 35 TABLET | Refills: 0 | Status: SHIPPED | OUTPATIENT
Start: 2019-01-13 | End: 2019-01-14

## 2019-01-14 ENCOUNTER — TELEPHONE (OUTPATIENT)
Dept: FAMILY MEDICINE | Facility: CLINIC | Age: 44
End: 2019-01-14

## 2019-01-14 RX ORDER — OXYCODONE HYDROCHLORIDE 5 MG/1
5-10 TABLET ORAL EVERY 6 HOURS PRN
Qty: 35 TABLET | Refills: 0 | Status: SHIPPED | OUTPATIENT
Start: 2019-01-14 | End: 2019-02-22

## 2019-01-14 NOTE — TELEPHONE ENCOUNTER
Forms from [facility]- omega-3 acid ethyl esters (LOVAZA) 1 g capsule placed on Dr. Lyles desk for completion.    Farida James

## 2019-01-14 NOTE — TELEPHONE ENCOUNTER
RX not located.  Not on printer this morning.  Not up front for  already. Local print time at 8:27 PM last night.  Please re-print rx.      Alma MASSEY)(CHEY)

## 2019-01-14 NOTE — TELEPHONE ENCOUNTER
Reason for Call:  Other prescription    Detailed comments: [facility] calling to request diagnosis code as well as previous tried and filled date of medication.  They will also be faxing this information as well and are requesting a call back.     Phone Number [facility] can be reached at: Other phone number:  [phone]    Best Time: anytime    Can we leave a detailed message on this number? YES    Call taken on 1/14/2019 at 1:20 PM by Ray Flores

## 2019-01-16 ENCOUNTER — OFFICE VISIT (OUTPATIENT)
Dept: OPTOMETRY | Facility: CLINIC | Age: 44
End: 2019-01-16
Payer: MEDICARE

## 2019-01-16 DIAGNOSIS — H52.203 MYOPIA OF BOTH EYES WITH ASTIGMATISM AND PRESBYOPIA: Primary | ICD-10-CM

## 2019-01-16 DIAGNOSIS — H52.4 MYOPIA OF BOTH EYES WITH ASTIGMATISM AND PRESBYOPIA: Primary | ICD-10-CM

## 2019-01-16 DIAGNOSIS — H52.13 MYOPIA OF BOTH EYES WITH ASTIGMATISM AND PRESBYOPIA: Primary | ICD-10-CM

## 2019-01-16 DIAGNOSIS — E11.9 DM TYPE 2 WITHOUT RETINOPATHY (H): ICD-10-CM

## 2019-01-16 PROCEDURE — 92014 COMPRE OPH EXAM EST PT 1/>: CPT | Performed by: OPTOMETRIST

## 2019-01-16 ASSESSMENT — TONOMETRY
OS_IOP_MMHG: 17
IOP_METHOD: APPLANATION
OD_IOP_MMHG: 17

## 2019-01-16 ASSESSMENT — VISUAL ACUITY
OS_CC: 20/20
OD_CC: 20/25
OS_SC: J3
OD_SC: J3
OS_CC: J1
METHOD: SNELLEN - LINEAR
OD_CC: J1

## 2019-01-16 ASSESSMENT — REFRACTION_MANIFEST
OD_ADD: +1.50
OD_AXIS: 030
OS_AXIS: 109
METHOD_AUTOREFRACTION: 1
OS_ADD: +1.50
OS_CYLINDER: +1.25
OD_SPHERE: -3.50
OD_CYLINDER: +1.25
OD_CYLINDER: +1.25
OS_SPHERE: -3.50
OS_CYLINDER: +1.25
OS_AXIS: 115
OS_SPHERE: -3.50
OD_SPHERE: -3.50
OD_AXIS: 032

## 2019-01-16 ASSESSMENT — CUP TO DISC RATIO
OS_RATIO: 0.3
OD_RATIO: 0.3

## 2019-01-16 ASSESSMENT — REFRACTION_WEARINGRX
OS_AXIS: 109
OS_ADD: +1.25
SPECS_TYPE: PAL
OD_AXIS: 045
OS_SPHERE: -3.50
OD_SPHERE: -3.25
OD_ADD: +1.25
OD_CYLINDER: +1.25
OS_CYLINDER: +1.00

## 2019-01-16 ASSESSMENT — CONF VISUAL FIELD
OD_NORMAL: 1
METHOD: COUNTING FINGERS
OS_NORMAL: 1

## 2019-01-16 ASSESSMENT — SLIT LAMP EXAM - LIDS
COMMENTS: NORMAL
COMMENTS: NORMAL

## 2019-01-16 ASSESSMENT — EXTERNAL EXAM - LEFT EYE: OS_EXAM: NORMAL

## 2019-01-16 ASSESSMENT — EXTERNAL EXAM - RIGHT EYE: OD_EXAM: NORMAL

## 2019-01-16 NOTE — PATIENT INSTRUCTIONS
Prescription given for glasses.    Patient Education   Diabetes weakens the blood vessels all over the body, including the eyes. Damage to the blood vessels in the eyes can cause swelling or bleeding into part of the eye (called the retina). This is called diabetic retinopathy (GEMMA-tin-AH-puh-thee). If not treated, this disease can cause vision loss or blindness.   Symptoms may include blurred or distorted vision, but many people have no symptoms. It's important to see your eye doctor regularly to check for problems.   Early treatment and good control can help protect your vision. Here are the things you can do to help prevent vision loss:      1. Keep your blood sugar levels under tight control.      2. Bring high blood pressure under control.      3. No smoking.      4. Have yearly dilated eye exams.

## 2019-01-16 NOTE — PROGRESS NOTES
Chief Complaint   Patient presents with     Diabetic Eye Exam     Patient recently diagnosed with DM type 2.  fasting 120 in the am. Patient states he had a 6 hour episode of blurry va on 12/31/2018, returned to normal when he woke up next day.        Lab Results   Component Value Date    A1C 6.7 12/24/2018    A1C 5.8 05/01/2018    A1C 5.8 01/18/2018    A1C 5.5 05/16/2017    A1C 5.5 09/01/2016         Last Eye Exam: 2016   Dilated Previously: Yes    What are you currently using to see?  glasses    Distance Vision Acuity: Satisfied with vision    Near Vision Acuity: Not satisfied     Eye Comfort: dry  Do you use eye drops? : Yes: systane ultra tid  Occupation or Hobbies: reading and computer work    MARIA ISABEL Brown       Medical, surgical and family histories reviewed and updated 1/16/2019.       OBJECTIVE: See Ophthalmology exam    ASSESSMENT:    ICD-10-CM    1. Myopia of both eyes with astigmatism and presbyopia H52.13     H52.203     H52.4    2. DM type 2 without retinopathy (H) E11.9       PLAN:    Joel Pineda aware  eye exam results will be sent to Ksenia Lyles.  Patient Instructions   Prescription given for glasses.    Patient Education   Diabetes weakens the blood vessels all over the body, including the eyes. Damage to the blood vessels in the eyes can cause swelling or bleeding into part of the eye (called the retina). This is called diabetic retinopathy (GEMMA-tin--puh-thee). If not treated, this disease can cause vision loss or blindness.   Symptoms may include blurred or distorted vision, but many people have no symptoms. It's important to see your eye doctor regularly to check for problems.   Early treatment and good control can help protect your vision. Here are the things you can do to help prevent vision loss:      1. Keep your blood sugar levels under tight control.      2. Bring high blood pressure under control.      3. No smoking.      4. Have yearly dilated eye exams.

## 2019-01-16 NOTE — LETTER
1/16/2019         RE: Joel Pineda  [address] [place] [address]        Dear Colleague,    Thank you for referring your patient, Joel Pineda, to the [facility]. Please see a copy of my visit note below.    Chief Complaint   Patient presents with     Diabetic Eye Exam     Patient recently diagnosed with DM type 2.  fasting 120 in the am. Patient states he had a 6 hour episode of blurry va on 12/31/2018, returned to normal when he woke up next day.        Lab Results   Component Value Date    A1C 6.7 12/24/2018    A1C 5.8 05/01/2018    A1C 5.8 01/18/2018    A1C 5.5 05/16/2017    A1C 5.5 09/01/2016         Last Eye Exam: 2016   Dilated Previously: Yes    What are you currently using to see?  glasses    Distance Vision Acuity: Satisfied with vision    Near Vision Acuity: Not satisfied     Eye Comfort: dry  Do you use eye drops? : Yes: systane ultra tid  Occupation or Hobbies: reading and computer work    MARIA ISABEL Brown       Medical, surgical and family histories reviewed and updated 1/16/2019.       OBJECTIVE: See Ophthalmology exam    ASSESSMENT:    ICD-10-CM    1. Myopia of both eyes with astigmatism and presbyopia H52.13     H52.203     H52.4    2. DM type 2 without retinopathy (H) E11.9       PLAN:    Joel Pineda aware  eye exam results will be sent to Ksenia Lyles.  Patient Instructions   Prescription given for glasses.    Patient Education   Diabetes weakens the blood vessels all over the body, including the eyes. Damage to the blood vessels in the eyes can cause swelling or bleeding into part of the eye (called the retina). This is called diabetic retinopathy (GEMMA-tin-AH-puh-thee). If not treated, this disease can cause vision loss or blindness.   Symptoms may include blurred or distorted vision, but many people have no symptoms. It's important to see your eye doctor regularly to check for problems.   Early treatment and good control can help  protect your vision. Here are the things you can do to help prevent vision loss:      1. Keep your blood sugar levels under tight control.      2. Bring high blood pressure under control.      3. No smoking.      4. Have yearly dilated eye exams.                Again, thank you for allowing me to participate in the care of your patient.        Sincerely,        Clare Miramontes OD

## 2019-01-18 ENCOUNTER — ALLIED HEALTH/NURSE VISIT (OUTPATIENT)
Dept: EDUCATION SERVICES | Facility: CLINIC | Age: 44
End: 2019-01-18
Payer: MEDICARE

## 2019-01-18 VITALS — BODY MASS INDEX: 39.62 KG/M2 | WEIGHT: 315 LBS

## 2019-01-18 DIAGNOSIS — E11.8 TYPE 2 DIABETES MELLITUS WITH COMPLICATION, WITHOUT LONG-TERM CURRENT USE OF INSULIN (H): Primary | ICD-10-CM

## 2019-01-18 PROCEDURE — G0108 DIAB MANAGE TRN  PER INDIV: HCPCS

## 2019-01-18 NOTE — PATIENT INSTRUCTIONS
Dr. Perez- podiatrist - you can ask Dr. Lyles for a referral     Diabetes Support Resources:  Websites: American Association of Diabetes Educators Participant Resources: www.diabeteseducator.org/living-with-diabetes   American Diabetes Association: www.diabetes.org  Diabetes Together 2 Goal: http://wgpptyjz3zugv.org     Bring blood glucose meter and logbook with you to all doctor and follow-up appointments.     [facility] for the [facility]:  For Your Diabetes Education and Nutrition Appointments Call:  [phone]   For Diabetes Education or Nutrition Related Questions:   Phone: [phone]  E-mail: [email or web address]  Fax: [phone]   If you need a medication refill please contact your pharmacy. Please allow 3 business days for your refills to be completed.    Instructions for emailing the Diabetes Educators    If you need to communicate a non-urgent message to a Diabetes Educator via email, please send to [email or web address].    Please follow the following email guidelines:    Subject line: Secure: your clinic name (example: Secure: [facility])  In the email please include: First name, middle initial, last name and date of birth.    We will be in touch with you within one (1) business day.     Ericka Mora RD, LD, CDE

## 2019-01-18 NOTE — PROGRESS NOTES
"Put on 100 pounds since 2013    Diabetes Self-Management Education & Support    Diabetes Education Self Management & Training    SUBJECTIVE/OBJECTIVE:  Accompanied by: Self  Diabetes education in the past 24mo: No  Diabetes type: Type 2  Disease course: Worsening  How confident are you filling out medical forms by yourself:: Extremely  Transportation concerns: No  Other concerns:: None  Cultural Influences/Ethnic Background:  American    Diabetes Symptoms & Complications  Blurred vision: Yes  Fatigue: Yes  Foot ulcerations: No  Polydipsia: Yes  Polyphagia: No  Polyuria: Yes  Visual change: No  Weakness: No  Weight loss: No  Slow healing wounds: Yes  Symptom course: Stable  Weight trend: Stable  Autonomic neuropathy: No  CVA: No  Heart disease: No  Nephropathy: No  Retinopathy: No    Patient Problem List and Family Medical History reviewed for relevant medical history, current medical status, and diabetes risk factors.    Vitals:  Wt (!) 153.5 kg (338 lb 8 oz)   BMI 39.62 kg/m    Estimated body mass index is 39.62 kg/m  as calculated from the following:    Height as of 1/3/19: 1.969 m (6' 5.5\").    Weight as of this encounter: 153.5 kg (338 lb 8 oz).   Last 3 BP:   BP Readings from Last 3 Encounters:   01/03/19 (!) 130/96   12/31/18 136/86   12/24/18 137/89       History   Smoking Status     Never Smoker   Smokeless Tobacco     Never Used       Labs:  Lab Results   Component Value Date    A1C 6.7 12/24/2018     Lab Results   Component Value Date     01/03/2019     Lab Results   Component Value Date    LDL  12/24/2018     Cannot estimate LDL when triglyceride exceeds 400 mg/dL     HDL Cholesterol   Date Value Ref Range Status   12/24/2018 34 (L) >39 mg/dL Final   ]  GFR Estimate   Date Value Ref Range Status   01/03/2019 >90 >60 mL/min/[1.73_m2] Final     Comment:     Non  GFR Calc  Starting 12/18/2018, serum creatinine based estimated GFR (eGFR) will be   calculated using the Chronic Kidney " Disease Epidemiology Collaboration   (CKD-EPI) equation.       GFR Estimate If Black   Date Value Ref Range Status   2019 >90 >60 mL/min/[1.73_m2] Final     Comment:      GFR Calc  Starting 2018, serum creatinine based estimated GFR (eGFR) will be   calculated using the Chronic Kidney Disease Epidemiology Collaboration   (CKD-EPI) equation.       Lab Results   Component Value Date    CR 0.90 2019     No results found for: MICROALBUMIN    Healthy Eating  Healthy Eating Assessed Today: Yes  Cultural/Confucianist diet restrictions?: No  Meal planning: Avoiding sweets, Carbohydrate counting, Low salt, Smaller portions  Meals include: Breakfast, Lunch, Dinner, Snacks  Beverages: Water, Coffee, Milk, Juice  Has patient met with a dietitian in the past?: No            Being Active  Being Active Assessed Today: Yes  Exercise:: Currently not exercising(patient fell walking outside last year and has been hesitant to walk outside this winter)  Barrier to exercise: Safety, Other    Monitoring  Monitoring Assessed Today: Yes  Did patient bring glucose meter to appointment? : Yes(brought log sheets)  Blood Glucose Meter: [employer]  Home Glucose (Sugar) Monitorin-2 times per day  Blood glucose trend: Fluctuating minimally  Low Glucose Range (mg/dL): 110-130  High Glucose Range (mg/dL): 140-180        Taking Medications  Diabetes Medication(s)     Biguanides Sig    metFORMIN (GLUCOPHAGE-XR) 500 MG 24 hr tablet Take 1 tablet (500 mg) by mouth daily (with dinner)          Taking Medication Assessed Today: Yes  Current Treatments: Diet, Oral Agent (monotherapy)  Problems taking diabetes medications regularly?: No  Diabetes medication side effects?: No  Treatment Compliance: All of the time    Problem Solving  Problem Solving Assessed Today: No  Hypoglycemia Frequency: Never  Patient carries a carbohydrate source: No  Medical alert: No  Severe weather/disaster plan for diabetes management?: No  DKA  prevention plan?: No  Sick day plan for diabetes management?: (P) No    Hypoglycemia symptoms  Confusion: No  Dizziness or Light-Headedness: No  Headaches: Yes  Hunger: No  Mood changes: Yes  Nervousness/Anxiety: Yes  Sleepiness: Yes  Speech difficulty: Yes  Sweats: Yes  Tremors: No         Reducing Risks  Reducing Risks Assessed Today: No  CAD Risks: Diabetes Mellitus, Dyslipidemia, Family history, Hypertension, Male sex, Obesity, Sedentary lifestyle, Stress  Has dilated eye exam at least once a year?: Yes  Sees dentist every 6 months?: Yes  Sees podiatrist (foot doctor)?: No    Healthy Coping  Informal Support system:: Family, Friends, Parent  Difficulty affording diabetes management supplies?: No  Patient Activation Measure Survey Score:  GAMAL Score (Last Two) 3/2/2011 6/12/2014   GAMAL Raw Score 43 34   Activation Score 68.5 43.4   GAMAL Level 4 1       ASSESSMENT/Discussion:  Tito was newly diagnosed with diabetes in December 2018.  He mentions that he did have prediabetes for a while and was on 1,000 mg of Metformin.  He is wondering about how he would be able to tell if his neuropathy is from diabetes, writer recommended a podiatry referral, he will discuss with Dr. Lyles.  Tito is also wondering about some nausea that he is experiencing when he eats really sweet foods.  We discussed this.  Tito has done a lot of research on his own about diabetes and does seem to have a good understanding.  He is tracking his blood sugars on an james.  Tito is wondering if he is eating too many snacks throughout the day.  We discussed that if he continues to lose weight and see good blood sugar control he can continue with what he is doing, as long as he is choosing healthy snacks. We discussed basic carb counting.       Patient's most recent   Lab Results   Component Value Date    A1C 6.7 12/24/2018    is meeting goal of <7.0    INTERVENTION:   Diabetes knowledge and skills assessment:     Patient is knowledgeable in  diabetes management concepts related to: Monitoring and Taking Medication    Patient needs further education on the following diabetes management concepts: Healthy Eating, Being Active, Monitoring, Taking Medication, Problem Solving, Reducing Risks and Healthy Coping    Based on learning assessment above, most appropriate setting for further diabetes education would be: Group class or Individual setting.    Education provided today on:  AADE Self-Care Behaviors:  Diabetes Pathophysiology  Healthy Eating: carbohydrate counting, consistency in amount, composition, and timing of food intake and portion control    Opportunities for ongoing education and support in diabetes-self management were discussed.    Pt verbalized understanding of concepts discussed and recommendations provided today.       Education Materials Provided:  [facility] Understanding Diabetes Booklet    PLAN:  See Patient Instructions for co-developed, patient-stated behavior change goals.  AVS printed and provided to patient today. See Follow-Up section for recommended follow-up.    Ericka Mora RD, LD, CDE  Time Spent: 60 minutes  Encounter Type: Individual    Any diabetes medication dose changes were made via the CDE Protocol and Collaborative Practice Agreement with the patient's referring provider. A copy of this encounter was shared with the provider.

## 2019-01-23 ENCOUNTER — OFFICE VISIT (OUTPATIENT)
Dept: FAMILY MEDICINE | Facility: CLINIC | Age: 44
End: 2019-01-23
Payer: MEDICARE

## 2019-01-23 VITALS
TEMPERATURE: 97.8 F | SYSTOLIC BLOOD PRESSURE: 128 MMHG | WEIGHT: 315 LBS | HEIGHT: 78 IN | BODY MASS INDEX: 36.45 KG/M2 | OXYGEN SATURATION: 96 % | DIASTOLIC BLOOD PRESSURE: 86 MMHG | HEART RATE: 84 BPM | RESPIRATION RATE: 20 BRPM

## 2019-01-23 DIAGNOSIS — I10 HYPERTENSION GOAL BP (BLOOD PRESSURE) < 140/90: Chronic | ICD-10-CM

## 2019-01-23 DIAGNOSIS — E11.8 TYPE 2 DIABETES MELLITUS WITH COMPLICATION, WITHOUT LONG-TERM CURRENT USE OF INSULIN (H): Primary | ICD-10-CM

## 2019-01-23 DIAGNOSIS — G62.9 PERIPHERAL POLYNEUROPATHY: ICD-10-CM

## 2019-01-23 PROCEDURE — 99214 OFFICE O/P EST MOD 30 MIN: CPT | Performed by: FAMILY MEDICINE

## 2019-01-23 RX ORDER — RAMIPRIL 10 MG/1
10 CAPSULE ORAL DAILY
Qty: 90 CAPSULE | Refills: 1 | Status: CANCELLED | OUTPATIENT
Start: 2019-01-23

## 2019-01-23 RX ORDER — METFORMIN HCL 500 MG
1000 TABLET, EXTENDED RELEASE 24 HR ORAL
Qty: 180 TABLET | Refills: 3 | Status: SHIPPED | OUTPATIENT
Start: 2019-01-23 | End: 2020-02-03

## 2019-01-23 ASSESSMENT — PAIN SCALES - GENERAL: PAINLEVEL: MILD PAIN (3)

## 2019-01-23 ASSESSMENT — MIFFLIN-ST. JEOR: SCORE: 2545.28

## 2019-01-23 NOTE — PROGRESS NOTES
"  SUBJECTIVE:   Joel Pineda is a 43 year old male who presents to clinic today for the following health issues:      Diabetes Follow-up    Patient is checking blood sugars: once daily.  Results are as follows:         am - 126    Diabetic concerns: None     Symptoms of hypoglycemia (low blood sugar): none but has had some fatigue, nausea     Paresthesias (numbness or burning in feet) or sores: Yes      Date of last diabetic eye exam: 1/14/19    BP Readings from Last 2 Encounters:   01/23/19 144/80   01/03/19 (!) 130/96     Hemoglobin A1C (%)   Date Value   12/24/2018 6.7 (H)   05/01/2018 5.8 (H)     LDL Cholesterol Calculated (mg/dL)   Date Value   12/24/2018     Cannot estimate LDL when triglyceride exceeds 400 mg/dL   07/03/2018     Cannot estimate LDL when triglyceride exceeds 400 mg/dL     LDL Cholesterol Direct (mg/dL)   Date Value   07/03/2018 84       Diabetes Management Resources    Amount of exercise or physical activity: 4-5 days/week for an average of 15-30 minutes    Problems taking medications regularly: No    Medication side effects: none    Diet: diabetic    SUBJECTIVE:  Here today in follow-up of recent diagnosis of type 2 diabetes.  Started metformin once daily with no complications and has met with diabetes education.  Working on dietary changes.  Has been dealing with some peripheral neuropathy even prior to this diagnosis.  Has a well-known back history that may be contributing.  We do not know the extent of the contribution from the diabetes but did discuss wanting to get sugars down to a normal level as part of the treatment of this.  Lyrica has helped with the pain but the numbness continues.    Review of systems otherwise negative.  Past medical, family, and social history reviewed and updated in chart.    OBJECTIVE:  /86   Pulse 84   Temp 97.8  F (36.6  C) (Oral)   Resp 20   Ht 1.969 m (6' 5.5\")   Wt (!) 153.5 kg (338 lb 6.4 oz)   SpO2 96%   BMI 39.61 kg/m    Alert, pleasant, " upbeat, and in no apparent discomfort.  Obese  S1 and S2 normal, no murmurs, clicks, gallops or rubs. Regular rate and rhythm. Chest is clear; no wheezes or rales. No edema or JVD.  Past labs reviewed with the patient.     ASSESSMENT / PLAN:  (E11.8) Type 2 diabetes mellitus with complication, without long-term current use of insulin (H)  (primary encounter diagnosis)  Comment:  We will increase the metformin to 1000 mg daily and recheck lab work in about 6 weeks.  Plan: metFORMIN (GLUCOPHAGE-XR) 500 MG 24 hr tablet,         **A1C FUTURE anytime, **Comprehensive metabolic        panel FUTURE anytime, Lipid panel reflex to         direct LDL Fasting            (I10) Hypertension goal BP (blood pressure) < 140/90  Comment: Borderline.  Currently acceptable but we will continue to follow with lifestyle changes.  Is on a maximum dosage of metoprolol currently.  On 10 mg of ramipril and this could be increased but we may consider adding a third agent such as a calcium channel blocker or diuretic instead  Plan:     (G62.9) Peripheral polyneuropathy  Comment: We are going to work to get sugars down aggressively.  Might consider neurology referral as an initial step rather than neurosurgery if needed  Plan:     Follow up 6 weeks for recheck as above  JA Lyles MD    (Chart documentation completed in part with Dragon voice-recognition software.  Even though reviewed some grammatical, spelling, and word errors may remain.)

## 2019-01-28 PROBLEM — M25.561 RIGHT KNEE PAIN: Status: RESOLVED | Noted: 2018-11-28 | Resolved: 2019-01-28

## 2019-01-28 PROBLEM — M54.2 NECK PAIN, ACUTE: Status: RESOLVED | Noted: 2018-11-28 | Resolved: 2019-01-28

## 2019-01-28 PROBLEM — K57.20 DIVERTICULITIS OF LARGE INTESTINE WITH ABSCESS WITHOUT BLEEDING: Status: RESOLVED | Noted: 2017-11-01 | Resolved: 2019-01-28

## 2019-01-28 PROBLEM — Z86.711 HISTORY OF PULMONARY EMBOLISM: Status: ACTIVE | Noted: 2019-01-28

## 2019-01-28 PROBLEM — M54.50 ACUTE BILATERAL LOW BACK PAIN WITHOUT SCIATICA: Status: RESOLVED | Noted: 2018-11-28 | Resolved: 2019-01-28

## 2019-01-30 ENCOUNTER — OFFICE VISIT (OUTPATIENT)
Dept: RHEUMATOLOGY | Facility: CLINIC | Age: 44
End: 2019-01-30
Payer: MEDICARE

## 2019-01-30 VITALS
OXYGEN SATURATION: 97 % | HEART RATE: 74 BPM | HEIGHT: 78 IN | DIASTOLIC BLOOD PRESSURE: 84 MMHG | WEIGHT: 315 LBS | SYSTOLIC BLOOD PRESSURE: 118 MMHG | BODY MASS INDEX: 36.45 KG/M2

## 2019-01-30 DIAGNOSIS — M45.8 ANKYLOSING SPONDYLITIS OF SACRAL REGION (H): Primary | ICD-10-CM

## 2019-01-30 PROCEDURE — 99213 OFFICE O/P EST LOW 20 MIN: CPT | Performed by: INTERNAL MEDICINE

## 2019-01-30 ASSESSMENT — MIFFLIN-ST. JEOR: SCORE: 2543.47

## 2019-01-30 NOTE — PROGRESS NOTES
"Rheumatology Clinic Visit      Joel Pineda MRN# [id number]   YOB: 1975 Age: 43 year old      Date of visit: 1/30/19   PCP: Dr. Ksenia Lyles    Chief Complaint   Patient presents with:  Follow Up: 4 month ankylosing spondylitis      Assessment and Plan     1.  Ankylosing spondylitis: Many years of inflammatory back pain but no clear sacroiliitis seen on x-rays or MRIs; then had a lumbar spine x-ray on 2/23/2018 at [facility] showing \"Moderate L5-S1 and mild L4-5 degenerative disc disease and degenerative facet arthropathy. No evidence for fracture, subluxation, or spondylolisthesis. Chronic bridging enthesophyte across the lower right SI joint with irregularity of the joint space suspicious for sequelae of chronic inflammatory sacroiliitis. Correlate clinically.\"  This is complicated by a history of back surgery and degenerative changes.  HLA-B27 positive per record review but the actual lab report has not been seen.  He has a personal history of diverticulitis requiring sigmoidectomy.  Reportedly his mother has ulcerative colitis. Based on his symptoms, the x-ray results, and HLA-B27 positive, it is most likely ankylosing spondylitis and Remicade was started with improvement of lower back pain and resolution of lower back stiffness. However, Remicade was also associated with increased infections so it was changed to Simponi about 3 months ago and he feels like Simponi is helpful.  - Continue Simponi 2mg/kg IV every 8 weeks    # Golimumab (Simponi) Risks and Benefits: The risks and benefits of golimumab were discussed in detail and the patient verbalized understanding.  The risks discussed include, but are not limited to, the risk for hypersensitivity, anaphylaxis, anaphylactoid reactions, an increased risk for serious infections leading to hospitalization or death, a possible increased risk for lymphoma and other malignancies, a possible worsening of demyelinating diseases, a possible worsening of " heart failure, risk for cytopenias, risk for drug induced lupus, possible reactivation of hepatitis B, and possible reactivation of latent tuberculosis.  Subcutaneous injections may result in injection site reactions and/or pain at the site of injection.  The most common adverse reactions are infections and injection site reactions.  It was discussed that the medication would need to be discontinued if a serious infection develops.  It was discussed that live vaccinations should not be received while using golimumab or within 30 days prior to starting golimumab.  I encouraged reviewing the package insert and asking any questions about the medication.      2. BMI 39.57, obesity; reported hx of fatty liver disease; elevated LFT: encouraged weight loss and discussed the health benefit    3.  Vaccinations: Vaccinations reviewed with Mr. Pineda.  Risks and benefits of vaccinations were discussed.  CDC stance on shingrix when on moderate to high immunosuppression reviewed.  I explained that Shingrix is used off label when under 50 years old and that the safety and efficacy of the vaccine has not been tested in people younger than 50 years old.   - Influenza: encouraged yearly vaccination  - [id number]: up to date  - [id number]: up to date  - Shingrix: advised receiving; he is going to check on insurance coverage      Mr. Pineda verbalized agreement with and understanding of the rational for the diagnosis and treatment plan.  All questions were answered to best of my ability and the patient's satisfaction. Mr. Pineda was advised to contact the clinic with any questions that may arise after the clinic visit.      Thank you for involving me in the care of the patient    Return to clinic: 4 months      HPI   Joel Pineda is a 43 year old male with a past medical history significant for hypertension, hyperlipidemia, asthma, history of pulmonary embolism, history of diverticulitis requiring sigmoidectomy, GERD, obstructive sleep  apnea, bipolar disorder, hypothyroidism, B12 deficiency, CKD? (reportedly issue with contrast) and chronic pain syndrome who presents for follow-up of ankylosing spondylitis.    Today, Mr. Pineda reports that he has worsening neuropathy in his feet; has diabetes; and is on lyrica.  No other numbness or tingling.  Still taking B12 injections.  Planning to see a neurologist at [facility] in 3 weeks.  Peripheral joints: okay.  Back is more angry; infusions are helping.  No side effects from Simponi. Feels like Simponi is working.     Worsening depression.  Anxiety worse.  Planning to re-establish with a psychiatrist on Friday.    Denies fevers, chills, nausea, vomiting, constipation, diarrhea. No abdominal pain. No chest pain/pressure, palpitations, or shortness of breath. No LE swelling. No neck pain. No oral or nasal sores.  No rash. No sicca symptoms. No photosensitivity or photophobia. No eye pain or redness. No history of inflammatory eye disease.  No history of Raynaud's Phenomenon.  No seizure history.  No known renal disorder.      Pulmonary emboli a couple years ago; tx'd with warfarin for 6 mo.    Mother: ulcerative colitis    Tobacco: None  EtOH: None  Drugs: None    ROS   GEN: No fevers, chills, night sweats, or weight change  SKIN: No itching, rashes, sores  HEENT: No oral or nasal ulcers.  CV: No chest pain, pressure, palpitations, or dyspnea on exertion.  PULM: No SOB, wheeze, cough.  GI: No nausea, vomiting, constipation, diarrhea. No blood in stool. No abdominal pain.  : No blood in urine.  MSK: See HPI.  NEURO: See HPI  EXT: No LE swelling  PSYCH: See HPI    Active Problem List     Patient Active Problem List   Diagnosis     Major depressive disorder, recurrent episode (H)     Intermittent asthma     Hyperlipidemia LDL goal <100     Chronic nonallergic rhinitis     Diverticulosis     GERD (gastroesophageal reflux disease)     Anxiety     LLOYD (obstructive sleep apnea)- mild (AHI 11)      Intracranial arachnoid cyst     Facet arthritis of cervical region (H)     Acquired hypothyroidism     Bipolar 2 disorder (H)     Chronic midline low back pain without sciatica     Irritable bowel syndrome with diarrhea     B12 deficiency     Essential hypertension with goal blood pressure less than 140/90     Chronic pain syndrome     Ankylosing spondylitis of sacral region (H)     Morbid obesity due to hypertriglyceridemia (H)     Fatty infiltration of liver     DDD (degenerative disc disease), lumbar     Type 2 diabetes mellitus with complication, without long-term current use of insulin (H)     Peripheral polyneuropathy     History of pulmonary embolism     Past Medical History     Past Medical History:   Diagnosis Date     Acne      Chest pain     Chest pain, regulated w/BP meds. Clear arteries.     Skin exam, screening for cancer 12/3/2013     Past Surgical History     Past Surgical History:   Procedure Laterality Date     BACK SURGERY  10/07    lumbar discectomy L5-S1     COLONOSCOPY      Note: colonoscopy scheduled with [facility] on Friday, 9/4/15     COSMETIC SURGERY  2012    Nose Exterior - functional     GI SURGERY  August 2013    Sigmoidectomy     HERNIA REPAIR, UMBILICAL  8/23/11    [facility], Dr. Evan Beavers     INCISION AND DRAINAGE, ABSCESS, COMPLEX  8/23/11    umbilical, Dr. Evan Beavers     LAPAROSCOPIC ASSISTED COLECTOMY LEFT (DESCENDING)  8/15/2013    Procedure: LAPAROSCOPIC ASSISTED COLECTOMY LEFT (DESCENDING);  Laparoscopic Hand Assisted Sigmoid Resection, Mobilization of Splenic Fissure, coloproctoscopy, *Latex Free Room* Anesthesia General with Pain block  ;  Surgeon: Aurora Justice MD;  Location: [facility] OR     NERVE SURGERY  8/18/11    RF ablation @ L3-S1 @ [facility]     RECONSTRUCT NOSE AND SEPTUM (FUNCTIONAL)  10/14/2011    Procedure:RECONSTRUCT NOSE AND SEPTUM (FUNCTIONAL); Functional Septorhinoplasty, Turbinate Reduction, ; Surgeon:CEDRIC CUEVAS; Location:[facility] OR     SINUS SURGERY  10/1/01     ethmoidectomy chronic sinusitis     Allergy     Allergies   Allergen Reactions     Banana Shortness Of Breath     Pt reports organic Banana is okay.      Nitroglycerin Palpitations     Penicillins Anaphylaxis     Provigil [Modafinil] Shortness Of Breath     headache     Ciprofloxacin Palpitations, Other (See Comments) and Rash     dizziness (versus metronidazole taken at same time)     Gadolinium Hives and Itching     Patient was premedicated for the contrast allergy. He did still have a reaction a few hours after injection. Hives and itching. Dr. Gomez told tech to inform pt he should only have contrast again in the future when premedicated and at a hospital. Not at an outpatient facility.      Dulera Other (See Comments)     Hoarse voice     Dye [Contrast Dye] Other (See Comments) and Hives     Moderate flushing, CT contrast     Neurontin [Gabapentin] Hives     Moderate hives     Nortriptyline Hives     Varicella Virus Vaccine Live      Rash     Ciprofloxacin Palpitations     Flagyl [Metronidazole Hcl] Palpitations and Hives     Latex Rash     Metronidazole Palpitations, Other (See Comments) and Rash     dizziness (versus ciprofloxacin taken at same time)     No Clinical Screening - See Comments Rash     Nitrile gloves     Current Medication List     Current Outpatient Medications   Medication Sig     acetaminophen 500 MG CAPS Take 500 mg by mouth every 4 hours as needed     albuterol (PROAIR HFA/PROVENTIL HFA/VENTOLIN HFA) 108 (90 BASE) MCG/ACT Inhaler Inhale 2 puffs into the lungs every 4 hours as needed     ALPRAZolam (XANAX XR) 0.5 MG 24 hr tablet Take 1 mg and 0.5 mg on alternating days.     blood glucose monitoring (NO BRAND SPECIFIED) meter device kit Use to test blood sugar 2 times daily or as directed. Include test strips (2 boxes) with 3 refills     celecoxib (CELEBREX) 200 MG capsule TAKE 1 CAPSULE BY MOUTH TWICE DAILY AS NEEDED FOR MODERATE PAIN.     cetirizine (ZYRTEC) 10 MG tablet Take 1  tablet (10 mg) by mouth At Bedtime     cyanocobalamin (VITAMIN B12) 1000 MCG/ML injection Inject 1 mL (1,000 mcg) into the muscle every 30 days     EPINEPHrine (EPIPEN/ADRENACLICK/OR ANY BX GENERIC EQUIV) 0.3 MG/0.3ML injection 2-pack Inject 0.3 mLs (0.3 mg) into the muscle once as needed for anaphylaxis     fluticasone-salmeterol (ADVAIR DISKUS) 250-50 MCG/DOSE diskus inhaler Inhale 1 puff into the lungs     Ginger, Zingiber officinalis, (GINGER ROOT) 550 MG CAPS capsule Take 550 mg by mouth Up to three times daily     golimumab (SIMPONI ARIA) 50 MG/4ML injection      hydrOXYzine (ATARAX) 50 MG tablet Take  mg by mouth as needed For anxiety and insomnia     lamoTRIgine (LAMICTAL) 100 MG tablet Take 200 mg by mouth daily     levothyroxine (SYNTHROID/LEVOTHROID) 75 MCG tablet TAKE 1 TABLET (75 MCG) BY MOUTH EVERY MORNING.     loperamide (IMODIUM) 2 MG capsule Take 2 mg by mouth 4 times daily as needed for diarrhea     metFORMIN (GLUCOPHAGE-XR) 500 MG 24 hr tablet Take 2 tablets (1,000 mg) by mouth daily (with dinner)     metoprolol succinate ER (TOPROL-XL) 200 MG 24 hr tablet Take 2 tablets (400 mg) by mouth daily     montelukast (SINGULAIR) 10 MG tablet Take 10 mg by mouth daily      omega-3 acid ethyl esters (LOVAZA) 1 g capsule TAKE 2 CAPSULES BY MOUTH TWICE DAILY.     omeprazole 20 MG tablet Take 20 mg by mouth daily      ondansetron (ZOFRAN) 8 MG tablet      order for [facility] Respironics REMSTAR 60 Series Auto CPAP 9-13 cm H2O, Wisp nasal mask w/a large cushion and a chinstrap     oxyCODONE (ROXICODONE) 5 MG tablet Take 1-2 tablets (5-10 mg) by mouth every 6 hours as needed for pain (maximum 4 tablet(s) per day)     pregabalin (LYRICA) 150 MG capsule Take 1 capsule (150 mg) by mouth 2 times daily     pseudoePHEDrine (SUDAFED) 120 MG 12 hr tablet Take 1 tablet (120 mg) by mouth every 12 hours     ramipril (ALTACE) 10 MG capsule Take 1 capsule (10 mg) by mouth daily     risperiDONE (RISPERDAL) 1 MG tablet Take 1  "tablet (1 mg) by mouth daily \     rizatriptan (MAXALT-MLT) 5 MG ODT tab Take 1 tablet (5 mg) by mouth at onset of headache for migraine     rosuvastatin (CRESTOR) 40 MG tablet Take 1 tablet (40 mg) by mouth daily     syringe, disposable, (BD TUBERCULIN SYRINGE) 1 ML MISC Equipment being ordered: 1 ml tuberculin syringes to be used for Vitamin B12 injections.     tiZANidine (ZANAFLEX) 4 MG tablet TAKE 1 TABLET BY MOUTH THREE TIMES DAILY AS NEEDED     vitamin C (ASCORBIC ACID) 500 MG tablet Take 500 mg by mouth daily     vitamin D3 (CHOLECALCIFEROL) 90039 UNITS capsule Take one capsule every two weeks.     No current facility-administered medications for this visit.          Social History   See HPI    Family History     Family History   Problem Relation Age of Onset     Musculoskeletal Disorder Mother         back     Anxiety Disorder Mother      Colon Polyps Mother      Ulcerative Colitis Mother         and ischemic small intestine, surgery     Hypertension Mother      Breast Cancer Mother      Osteoporosis Mother      Diabetes Mother         Type 2, Diagnosed in 2014     Depression Mother         Takes Cymbalta to help with chronic pain + depx     Thyroid Disease Mother         Hypothyroidism     Obesity Mother         Under much better control latter half of 2015     Musculoskeletal Disorder Father         back     Substance Abuse Father      Hypertension Father      Hyperlipidemia Father      Depression Father         Off meds for many years. Seems \"ok\"     Heart Disease Maternal Grandmother      Heart Disease Maternal Grandfather      Psychotic Disorder Paternal Grandfather      Suicide Paternal Grandfather      Depression Paternal Grandfather         Pediatrician. Committed suicide by pistol in 1990.     Musculoskeletal Disorder Brother         back     Depression Brother         Expressed as anger and moodiness     Substance Abuse Sister      Depression Sister         Mental Health Therapist, yet no " "anti-depressants?     Anxiety Disorder Sister         Mental Health Therapist, yet no anti-anxiety meds?     Other Cancer Other         Bladder Cancer - Fatal     Substance Abuse Brother      Colon Cancer No family hx of      Crohn's Disease No family hx of      Anesthesia Reaction No family hx of      Cancer No family hx of         No family history of skin cancer     Physical Exam     Temp Readings from Last 3 Encounters:   01/23/19 97.8  F (36.6  C) (Oral)   01/03/19 95.6  F (35.3  C) (Oral)   12/31/18 97.1  F (36.2  C) (Oral)     BP Readings from Last 5 Encounters:   01/30/19 (!) 137/93   01/23/19 128/86   01/03/19 (!) 130/96   12/31/18 136/86   12/24/18 137/89     Pulse Readings from Last 1 Encounters:   01/30/19 74     Resp Readings from Last 1 Encounters:   01/23/19 20     Estimated body mass index is 39.57 kg/m  as calculated from the following:    Height as of this encounter: 1.969 m (6' 5.5\").    Weight as of this encounter: 153.3 kg (338 lb).    GEN: NAD; obese  HEENT: MMM. No oral lesions. Anicteric, noninjected sclera  CV: S1, S2. RRR. No m/r/g.  PULM: CTA bilaterally. No w/c.  MSK: MCPs, PIPs, DIPs, wrists, elbows, shoulders, knees, ankles, and MTPs without swelling or tenderness to palpation.  Hips nontender to direct palpation but range of motion testing bothered his back.  No dactylitis.  Achilles tendons nontender to palpation.  Lumbar spine diffusely tender to palpation.  NEURO: UE and LE strengths 5/5 and equal bilaterally.   SKIN: No rash.  No nail pitting.  EXT: No LE edema  PSYCH: Alert. Appropriate.     Labs / Imaging (select studies)   RF/CCP  Recent Labs   Lab Test 08/07/15  0846 09/03/14  1232   RHF <20 <20     CBC  Recent Labs   Lab Test 01/03/19  0844 09/19/18  1512 05/31/18  0951 01/18/18  0834   WBC 8.6 10.5 10.6 8.5   RBC 5.24 5.04 4.89 5.23   HGB 15.8 15.3 15.0 15.2   HCT 46.8 44.9 44.0 45.8   MCV 89 89 90 88   RDW 13.4 14.3 13.9 13.6    239 259 330   MCH 30.2 30.4 30.7 29.1 "   MCHC 33.8 34.1 34.1 33.2   NEUTROPHIL  --  44.3 47.1 48.4   LYMPH  --  40.4 35.9 38.8   MONOCYTE  --  12.1 13.2 9.5   EOSINOPHIL  --  2.3 2.8 2.4   BASOPHIL  --  0.9 1.0 0.9   ANEU  --  4.7 5.0 4.1   ALYM  --  4.2 3.8 3.3   KATIE  --  1.3 1.4* 0.8   AEOS  --  0.2 0.3 0.2   ABAS  --  0.1 0.1 0.1     CMP  Recent Labs   Lab Test 01/03/19  0844 09/19/18  1512 05/17/18  1417 04/19/18  1116    141  --  138   POTASSIUM 4.1 4.6  --  3.9   CHLORIDE 104 106  --  104   CO2 25 26  --  23   ANIONGAP 9 9  --  11   * 135*  --  135*   BUN 12 10  --  12   CR 0.90 0.96  --  0.97   GFRESTIMATED >90 85  --  84   GFRESTBLACK >90 >90  --  >90   ALYCE 9.6 9.7  --  9.4   BILITOTAL 0.5 0.6 0.4  --    ALBUMIN 4.3 3.8 4.2  --    PROTTOTAL 7.9 7.3 7.7  --    ALKPHOS 106 107 106  --    AST 73* 59* 40  --    ALT 67 58 48  --      Calcium/VitaminD  Recent Labs   Lab Test 01/03/19  0844 09/19/18  1512 04/19/18  1116  05/03/17  1456  02/08/16  1555  09/26/11  0822 02/17/11  0839   ALYCE 9.6 9.7 9.4   < > 9.5   < >  --    < > 9.4 9.5   D3VIT  --   --   --   --   --   --   --   --  38 38   VITDT 38  --   --   --  30  --  34   < >  --   --     < > = values in this interval not displayed.     ESR/CRP  Recent Labs   Lab Test 02/13/16  1510 01/04/16  1403 10/25/15  0852  08/07/15  0846   SED 6 5  --   --  5   CRP 3.4 <2.9 3.1   < >  --     < > = values in this interval not displayed.     Hepatitis B  Recent Labs   Lab Test 02/28/18 1157 01/06/15  1028   HBCAB Nonreactive  --    HEPBANG Nonreactive Nonreactive     Hepatitis C  Recent Labs   Lab Test 02/28/18 1157 01/06/15  1028   HCVAB Nonreactive Nonreactive   Assay performance characteristics have not been established for newborns,   infants, and children       Tuberculosis Screening  Recent Labs   Lab Test 02/28/18 1157 01/06/15  1028   TBRSLT Negative Negative   TBAGN 0.00 0.00     Immunization History     Immunization History   Administered Date(s) Administered     Influenza (IIV3) PF  10/15/2008, 08/21/2012, 09/09/2013     Influenza Vaccine IM 3yrs+ 4 Valent IIV4 10/02/2015, 10/11/2016, 09/07/2018     Pneumo Conj 13-V (2010&after) 10/02/2015     Pneumococcal 23 valent 10/11/2016     TD (ADULT, 7+) 10/04/2002     TDAP Vaccine (Adacel) 07/16/2010          Chart documentation done in part with Dragon Voice recognition Software. Although reviewed after completion, some word and grammatical error may remain.    Oneil Baxter MD

## 2019-01-30 NOTE — NURSING NOTE
RAPID3 (0-30) Cumulative Score  15.5          RAPID3 Weighted Score (divide #4 by 3 and that is the weighted score)  5.16

## 2019-01-31 ENCOUNTER — OFFICE VISIT (OUTPATIENT)
Dept: NURSING | Facility: CLINIC | Age: 44
End: 2019-01-31
Payer: MEDICARE

## 2019-01-31 ENCOUNTER — MYC MEDICAL ADVICE (OUTPATIENT)
Dept: PULMONOLOGY | Facility: CLINIC | Age: 44
End: 2019-01-31

## 2019-01-31 DIAGNOSIS — J45.20 MILD INTERMITTENT ASTHMA WITHOUT COMPLICATION: Primary | Chronic | ICD-10-CM

## 2019-01-31 DIAGNOSIS — R91.8 PULMONARY NODULES: Primary | ICD-10-CM

## 2019-01-31 PROCEDURE — 94375 RESPIRATORY FLOW VOLUME LOOP: CPT | Performed by: INTERNAL MEDICINE

## 2019-01-31 PROCEDURE — 94729 DIFFUSING CAPACITY: CPT | Performed by: INTERNAL MEDICINE

## 2019-01-31 PROCEDURE — 94726 PLETHYSMOGRAPHY LUNG VOLUMES: CPT | Performed by: INTERNAL MEDICINE

## 2019-01-31 NOTE — TELEPHONE ENCOUNTER
Images in [place] PACS, reports sent to scanning.   Caitlin Alvarado RN   Pulmonary/Rheumatology Care Coordinator  [facility]

## 2019-01-31 NOTE — TELEPHONE ENCOUNTER
Records requested:     [facility] CT: 9/5/18, 5/17/18, 1/31/17, 10/10/16  Images and reports requested. LM for Film Room at [facility].     [facility]: CT 12/22/18  Images are being pushed, reports are being faxed.     Caitlin Alvarado RN   Pulmonary/Rheumatology Care Coordinator  [facility]

## 2019-02-01 LAB
DLCOUNC-%PRED-PRE: 97 %
DLCOUNC-PRE: 33.54 ML/MIN/MMHG
DLCOUNC-PRED: 34.37 ML/MIN/MMHG
ERV-%PRED-PRE: 40 %
ERV-PRE: 0.5 L
ERV-PRED: 1.22 L
EXPTIME-PRE: 6.6 SEC
FEF2575-%PRED-PRE: 99 %
FEF2575-PRE: 4.41 L/SEC
FEF2575-PRED: 4.43 L/SEC
FEFMAX-%PRED-PRE: 105 %
FEFMAX-PRE: 12.02 L/SEC
FEFMAX-PRED: 11.44 L/SEC
FEV1-%PRED-PRE: 89 %
FEV1-PRE: 4.4 L
FEV1FEV6-PRE: 82 %
FEV1FEV6-PRED: 81 %
FEV1FVC-PRE: 82 %
FEV1FVC-PRED: 79 %
FEV1SVC-PRE: 80 %
FEV1SVC-PRED: 77 %
FIFMAX-PRE: 9.8 L/SEC
FRCPLETH-%PRED-PRE: 64 %
FRCPLETH-PRE: 2.47 L
FRCPLETH-PRED: 3.83 L
FVC-%PRED-PRE: 85 %
FVC-PRE: 5.38 L
FVC-PRED: 6.26 L
IC-%PRED-PRE: 96 %
IC-PRE: 5 L
IC-PRED: 5.17 L
RVPLETH-%PRED-PRE: 86 %
RVPLETH-PRE: 1.97 L
RVPLETH-PRED: 2.29 L
TLCPLETH-%PRED-PRE: 89 %
TLCPLETH-PRE: 7.46 L
TLCPLETH-PRED: 8.34 L
VA-%PRED-PRE: 90 %
VA-PRE: 7.19 L
VC-%PRED-PRE: 85 %
VC-PRE: 5.49 L
VC-PRED: 6.39 L

## 2019-02-05 ENCOUNTER — MYC MEDICAL ADVICE (OUTPATIENT)
Dept: NEUROLOGY | Facility: CLINIC | Age: 44
End: 2019-02-05

## 2019-02-06 ENCOUNTER — ANCILLARY PROCEDURE (OUTPATIENT)
Dept: CT IMAGING | Facility: CLINIC | Age: 44
End: 2019-02-06
Payer: MEDICARE

## 2019-02-06 DIAGNOSIS — R91.8 PULMONARY NODULES: ICD-10-CM

## 2019-02-06 PROCEDURE — 71250 CT THORAX DX C-: CPT | Performed by: RADIOLOGY

## 2019-02-07 ENCOUNTER — ALLIED HEALTH/NURSE VISIT (OUTPATIENT)
Dept: EDUCATION SERVICES | Facility: CLINIC | Age: 44
End: 2019-02-07
Payer: MEDICARE

## 2019-02-07 DIAGNOSIS — E11.8 TYPE 2 DIABETES MELLITUS WITH COMPLICATION, WITHOUT LONG-TERM CURRENT USE OF INSULIN (H): Primary | ICD-10-CM

## 2019-02-07 PROCEDURE — G0109 DIAB MANAGE TRN IND/GROUP: HCPCS

## 2019-02-07 NOTE — PROGRESS NOTES
Diabetes Self-Management Training Class 2    Joel Pineda presents today for group education related to Type 2 diabetes   He is accompanied by self    Educational Topics Discussed  Diabetes Treatment Options, A1C Value, Hyperglycemia & Hypoglycemia Prevention and Symptoms, Why BG rise and fall, Exercise Guidelines, Effect of Stress on BG and Stress Management, Carbohydrate Counting Review, Alcohol, Healthy Eating Guidelines, Heart Healthy Eating (Fats, Fiber)    Materials Provided  IDEAS handout for rise and fall in BG values    All questions were answered in the group setting. Patient to contact educator with specific questions, should need arise.    Plan and Follow-up  Patient to continue working on progress toward meeting goals set at Class 1.   Patient to attend Diabetes Self-Management Training Class 3.  Patient to follow-up with educator regarding blood glucose values as determined at Diabetes Self-Management Training Initial Assessment Visit.     Patient-stated goal written and given to patient.     Ericka Mora RD, LD, CDE  Time Spent: 90 minutes

## 2019-02-07 NOTE — GROUP NOTE
Diabetes Self-Management Training Class 2    Joel Pineda presents today for group education related to Type 2 diabetes   He is accompanied by self    February 7  [place] [facility]  Start and End Time  Start Time: 1000  End Time: 1130    Educational Topics Discussed  Diabetes Treatment Options, A1C Value, Hyperglycemia & Hypoglycemia Prevention and Symptoms, Why BG rise and fall, Exercise Guidelines, Effect of Stress on BG and Stress Management, Carbohydrate Counting Review, Alcohol, Healthy Eating Guidelines, Heart Healthy Eating (Fats, Fiber)    Materials Provided  IDEAS handout for rise and fall in BG values    All questions were answered in the group setting. Patient to contact educator with specific questions, should need arise.    Plan and Follow-up  Patient to continue working on progress toward meeting goals set at Class 1.   Patient to attend Diabetes Self-Management Training Class 3.  Patient to follow-up with educator regarding blood glucose values as determined at Diabetes Self-Management Training Initial Assessment Visit.     Patient-stated goal written and given to patient.           Ericka Mora RD

## 2019-02-11 ENCOUNTER — TELEPHONE (OUTPATIENT)
Dept: FAMILY MEDICINE | Facility: CLINIC | Age: 44
End: 2019-02-11

## 2019-02-11 DIAGNOSIS — E78.1 HYPERTRIGLYCERIDEMIA: ICD-10-CM

## 2019-02-11 RX ORDER — ICOSAPENT ETHYL 1 G/1
2 CAPSULE ORAL 2 TIMES DAILY
Qty: 360 CAPSULE | Refills: 3 | Status: SHIPPED | OUTPATIENT
Start: 2019-02-11 | End: 2019-02-12

## 2019-02-11 NOTE — TELEPHONE ENCOUNTER
Message from [employer] phone# [phone] Fax# [phone]    omega-3 acid ethyl esters (LOVAZA) 1 g capsule not on patients formulary.  Please consider Vascepa, Fenofibrate or niacin as an alternative medication.  Or request a PA for a non formulary drug [phone].    Farida James

## 2019-02-12 ENCOUNTER — OFFICE VISIT (OUTPATIENT)
Dept: PHARMACY | Facility: CLINIC | Age: 44
End: 2019-02-12
Payer: COMMERCIAL

## 2019-02-12 ENCOUNTER — INFUSION THERAPY VISIT (OUTPATIENT)
Dept: INFUSION THERAPY | Facility: CLINIC | Age: 44
End: 2019-02-12
Payer: MEDICARE

## 2019-02-12 VITALS
OXYGEN SATURATION: 98 % | BODY MASS INDEX: 39.64 KG/M2 | SYSTOLIC BLOOD PRESSURE: 119 MMHG | HEART RATE: 66 BPM | TEMPERATURE: 97.6 F | WEIGHT: 315 LBS | DIASTOLIC BLOOD PRESSURE: 76 MMHG | RESPIRATION RATE: 16 BRPM

## 2019-02-12 DIAGNOSIS — I10 ESSENTIAL HYPERTENSION WITH GOAL BLOOD PRESSURE LESS THAN 140/90: ICD-10-CM

## 2019-02-12 DIAGNOSIS — F41.8 DEPRESSION WITH ANXIETY: Primary | ICD-10-CM

## 2019-02-12 DIAGNOSIS — E11.9 TYPE 2 DIABETES MELLITUS WITHOUT COMPLICATION, WITHOUT LONG-TERM CURRENT USE OF INSULIN (H): ICD-10-CM

## 2019-02-12 DIAGNOSIS — E78.5 HYPERLIPIDEMIA LDL GOAL <100: ICD-10-CM

## 2019-02-12 DIAGNOSIS — G89.4 CHRONIC PAIN SYNDROME: ICD-10-CM

## 2019-02-12 DIAGNOSIS — M45.8 ANKYLOSING SPONDYLITIS OF SACRAL REGION (H): ICD-10-CM

## 2019-02-12 DIAGNOSIS — G43.919 INTRACTABLE MIGRAINE WITHOUT STATUS MIGRAINOSUS, UNSPECIFIED MIGRAINE TYPE: ICD-10-CM

## 2019-02-12 DIAGNOSIS — R11.0 NAUSEA: ICD-10-CM

## 2019-02-12 DIAGNOSIS — M45.8 ANKYLOSING SPONDYLITIS OF SACRAL REGION (H): Primary | ICD-10-CM

## 2019-02-12 DIAGNOSIS — J45.31 MILD PERSISTENT ASTHMA WITH ACUTE EXACERBATION: ICD-10-CM

## 2019-02-12 PROCEDURE — 99207 ZZC NO CHARGE LOS: CPT

## 2019-02-12 PROCEDURE — 99607 MTMS BY PHARM ADDL 15 MIN: CPT | Performed by: PHARMACIST

## 2019-02-12 PROCEDURE — 96413 CHEMO IV INFUSION 1 HR: CPT | Performed by: NURSE PRACTITIONER

## 2019-02-12 PROCEDURE — 99605 MTMS BY PHARM NP 15 MIN: CPT | Performed by: PHARMACIST

## 2019-02-12 RX ORDER — ALPRAZOLAM 1 MG/1
1 TABLET, EXTENDED RELEASE ORAL DAILY
COMMUNITY
Start: 2017-12-05 | End: 2019-11-26 | Stop reason: DRUGHIGH

## 2019-02-12 RX ORDER — RISPERIDONE 0.5 MG/1
0.5 TABLET ORAL DAILY
COMMUNITY
End: 2019-04-16 | Stop reason: DRUGHIGH

## 2019-02-12 ASSESSMENT — PAIN SCALES - GENERAL: PAINLEVEL: MODERATE PAIN (4)

## 2019-02-12 NOTE — PROGRESS NOTES
Infusion Nursing Note:  Joel Pineda presents today for Kishnayasmeen.    Patient seen by provider today: No   present during visit today: Not Applicable.    Note: N/A.    Intravenous Access:  Peripheral IV placed.    Treatment Conditions:  Biological Infusion Checklist:    ~~~ NOTE: If the patient answers yes to any of the questions below, hold the infusion and contact ordering provider or on-call provider.    1. Have you recently had an elevated temperature, fever, chills, productive cough, coughing for 3 weeks or longer or hemoptysis,  abnormal vital signs, night sweats,  chest pain or have you noticed a decrease in your appetite, unexplained weight loss or fatigue? No  2. Do you have any open wounds or new incisions? No  3. Do you have any recent or upcoming hospitalizations, surgeries or dental procedures? No  4. Do you currently have or recently have had any signs of illness or infection or are you on any antibiotics? No  5. Have you had any new, sudden or worsening abdominal pain? No  6. Have you or anyone in your household received a live vaccination in the past 4 weeks? Please note:  No live vaccines while on biologic/chemotherapy until 6 months after the last treatment.  Patient can receive the flu vaccine (shot only) and the pneumovax.  It is optimal for the patient to get these vaccines mid cycle, but they can be given at any time as long as it is not on the day of the infusion. No  7. Have you recently been diagnosed with any new nervous system diseases (ie. Multiple sclerosis, Guillain Converse, seizures, neurological changes) or cancer diagnosis? Are you on any form of radiation or chemotherapy? No  8. Are you pregnant or breast feeding or do you have plans of pregnancy in the future? No  9. Have you been having any signs of worsening depression or suicidal ideations?  (benlysta only) No  10. Have there been any other new onset medical symptoms? No        Post Infusion Assessment:  Patient  tolerated infusion without incident.  Site patent and intact, free from redness, edema or discomfort.  No evidence of extravasations.  Access discontinued per protocol.  Biologic Infusion Post Education: Call the triage nurse at your clinic or seek medical attention if you have chills and/or temperature greater than or equal to 100.5, uncontrolled nausea/vomiting, diarrhea, constipation, dizziness, shortness of breath, chest pain, heart palpitations, weakness or any other new or concerning symptoms, questions or concerns.  You cannot have any live virus vaccines prior to or during treatment or up to 6 months post infusion.  If you have an upcoming surgery, medical procedure or dental procedure during treatment, this should be discussed with your ordering physician and your surgeon/dentist.  If you are having any concerning symptom, if you are unsure if you should get your next infusion or wish to speak to a provider before your next infusion, please call your care coordinator or triage nurse at your clinic to notify them so we can adequately serve you.    Discharge Plan:   Discharge instructions reviewed with: Patient.  Patient and/or family verbalized understanding of discharge instructions and all questions answered.  Patient discharged in stable condition accompanied by: self.  Departure Mode: Ambulatory.    Lenore Solares RN

## 2019-02-12 NOTE — PROGRESS NOTES
SUBJECTIVE/OBJECTIVE:                           Joel Pineda is a 42 year old male called for an initial visit for Medication Therapy Managemen for 2019.  He was referred to me from Ksenia Lyles.     Chief Complaint: Dizzy.    Allergies/ADRs: Reviewed in Epic  Tobacco: No tobacco use  Alcohol: none  Caffeine: not assessed today  Activity: Not assessed today  PMH: Reviewed in Epic    Asthma:  Current asthma medications: Advair 250/50 1 puff twice daily, Albuterol MDI.  Asthma triggers include: upper respiratory infections. Patient feels like his breathing is better and he hasn't had to use his albuterol as much.  Pt reports the following symptoms: none.  AAP on file: YES; needs updating  PIF was completed today: No  ACT Total Scores 7/10/2017 1/29/2018 8/28/2018   ACT TOTAL SCORE - - -   ASTHMA ER VISITS - - -   ASTHMA HOSPITALIZATIONS - - -   ACT TOTAL SCORE (Goal Greater than or Equal to 20) 23 25 16   In the past 12 months, how many times did you visit the emergency room for your asthma without being admitted to the hospital? 0 0 0   In the past 12 months, how many times were you hospitalized overnight because of your asthma? 0 0 0         Ankylosing Spondylitis: Current medications include Simponi. Patient has had 2 infusions and will have his next infusion in 8 weeks. Patient is tolerating infusion so far. Has not noticed any change in his symptoms after 2 infusions.  Patient continues to take Celecoxib 200 mg bid and hopefully decrease use once Simponi is on board. Patient has also been taking oxycodone for this pain, he uses 1 tablet twice a week.      Mood/Anxiety: current therapy includes lamictal 200mg daily, Xanax XR 1mg every other day alternating with 0.5mg every other day (pt would like to decrease his dose if possible),   risperidone 1mg daily. Patient sees his therapist every other week. Patient underwent TMS at [facility] and had a negative experience. He began experiencing headaches, tinnitus  and hallucinations. Patient's risperdal was increased to 1mg daily due for hallucinations and this has been helping.  He is not having them as often. Patient denies side effects from his medications. Patient's psychiatrist at [facility] has left and patient is looking for a new psychiatrist.Patient does have an appointment with Dr. Mohr at [facility] who is serving as an interim psychiatrist until patient can get in with [facility] in [place] in February.     Migraine:  Current therapy includes rizatriptan 5mg MLT patient has not had to take very many of them since he has stopped TMS.      Vitamin D Deficiency: current therapy includes Vitamin D 50,000 units every two weeks. Patient is wondering what his current level is because his level has been low in the past.      Hypertension: Current medications include metoprolol XL 400mg daily, ramipril 10mg daily.  Patient does not self-monitor BP but has a machine.  Patient reports no current medication side effects.    Pain: current therapy includes APAP as needed, celebrex 200mg bid prn (pt is taking every morning), lyrica. Patient is concerned celebrex could be affecting his kidney function. Patient is having a lumbar block next week and then will be having a radioablation. Patient rarely is using topical lidocaine. Patient uses it about once a month. It is expensive and patient won't likely refill. Patient uses oxycodone 10mg about every other day for flare ups. It is helpful for flare ups. Patient also uses tizanadie 4mg tid as needed for flare ups.     Hyperlipidemia: Current therapy includes atorvastatin 40mg once daily and Fenofibrate 145mg once daily.  Pt reports no significant myalgias or other side effects.   The 10-year ASCVD risk score ([place] MOISE Jr., et al., 2013) is: 4%    Values used to calculate the score:      Age: 43 years      Sex: Male      Is Non- : No      Diabetic: Yes      Tobacco smoker: No      Systolic Blood  Pressure: 118 mmHg      Is BP treated: Yes      HDL Cholesterol: 34 mg/dL      Total Cholesterol: 152 mg/dL     Supplements: current therapy in includes Vitamin D 50, 000 units every 2 weeks and Vitamin B 12 inj and MVI. Last Vitamin D and Vitamin B level wnl.     Allergies: current therapy includes cetirizine. Patient is tolerating and working well.     IBD: patient rarely uses bentyl. Uses IB guard. Patient does have occasional spasming but using the IB guard in place of the bentyl.     Hypothyroidism: Patient is taking levothyroxine 75 mcg daily. Patient is having the following symptoms: none.   TSH   Date Value Ref Range Status   12/24/2018 2.63 0.40 - 4.00 mU/L Final   ]    Diarrhea: current therapy includes loperamide. Patient takes about once a week.     GERD: Current medications include: Prilosec (omeprazole) 20 once daily. Pt c/o no current symptoms.  Patient feels that current regimen is effective.    Nausea: current therapy includes ondansetron 8 mg prn. Patient lately has been taking this a couple of times a day. He finds that this is effective.      ASSESSMENT:                             Current medications were reviewed today.     Medication Adherence: no issues identified    Dizzy: Patient may benefit from follow-up with PCP for ongoing dizziness.    Hypertension: In clinic blood pressures have been stable. Most recently blood pressures have been running higher at home could possibly be related to a viral gastroenteritis the patient is experiencing.  Continue to monitor.    Mood: Stable.  Patient in close follow-up with psychiatry.    Insomnia: Stable.    Anxiety: Stable.    Pain: Stable.    Asthma: Stable.  Patient is meeting ACT goal of greater than 20.    Hyperlipidemia: Stable.    Supplements: Stable.      Allergies: Stable.    IBD: Stable.    Hypothyroidism: Stable.    Diarrhea: Stable.    GERD: Stable.    Nausea: Stable.    PLAN:                            Follow-up with PCP regarding ongoing  dizziness.    I spent 60 minutes with this patient today (an extra 15 minutes was spent creating the Medication Action Plan). All changes were made via collaborative practice agreement with Ksenia Lyles. A copy of the visit note was provided to the patient's primary care provider.    Will follow up in 6 weeks.    The patient was sent via [employer] a summary of these recommendations as an after visit summary.     Radha Mcdonald, Pharm.D, BCACP  Medication Therapy Management Pharmacist

## 2019-02-12 NOTE — Clinical Note
Dr. Lyles,Would you be open to giving Tito Narcan to have on hand? He is on alprazolam, tizanadine and oxycodone and thought it might be good for him to have a prescription for this. Let me know what you think.Thanks,Daja

## 2019-02-12 NOTE — PATIENT INSTRUCTIONS
Recommendations from today's MTM visit:                                                        1. Use tizanadine as needed    2. I will reach out to Dr. Lyles about possibly obtaining a prescription for Narcan.    Next MTM visit: 4/9/2019 at 2pm in the infusion center    To schedule another MTM appointment, please call the clinic directly or you may call the MTM scheduling line at [phone] or toll-free at [phone].     My Clinical Pharmacist's contact information:                                                      It was a pleasure talking with you today!  Please feel free to contact me with any questions or concerns you have.      Radha Mcdonald, Pharm.D, [facility]  Medication Therapy Management Pharmacist      You may receive a survey about the MTM services you received.  I would appreciate your feedback to help me serve you better in the future. Please fill it out and return it when you can. Your comments will be anonymous.

## 2019-02-12 NOTE — PROGRESS NOTES
SUBJECTIVE/OBJECTIVE:                           Joel Pineda is a 42 year old male seen in the infusion center for an initial visit for Medication Therapy Managemen for 2019.  He was referred to me from Ksenia Lyles.     Chief Complaint: Dizzy.    Allergies/ADRs: Reviewed in Epic  Tobacco: No tobacco use  Alcohol: none  Caffeine: not assessed today  Activity: Not assessed today  PMH: Reviewed in Epic    Asthma:  Current asthma medications: Advair 250/50 1 puff twice daily, Albuterol MDI.  Asthma triggers include: upper respiratory infections. Patient feels like his breathing is better and he hasn't had to use his albuterol as much.  Pt reports the following symptoms: none.  AAP on file: YES; needs updating  PIF was completed today: No; patient in infusion center.  ACT Total Scores 1/29/2018 8/28/2018 1/3/2019   ACT TOTAL SCORE - - -   ASTHMA ER VISITS - - -   ASTHMA HOSPITALIZATIONS - - -   ACT TOTAL SCORE (Goal Greater than or Equal to 20) 25 16 22   In the past 12 months, how many times did you visit the emergency room for your asthma without being admitted to the hospital? 0 0 1   In the past 12 months, how many times were you hospitalized overnight because of your asthma? 0 0 0        Ankylosing Spondylitis/Pain: Current medications include Simponi 300mg once every 8 weeks.   Patient takes Celecoxib 200 mg once daily. Patient has noticed an improvement in symptoms with Simponi infusions. Patient has also been taking oxycodone 5mg every night around 5pm and tizanadine 4mg at 5pm every other evening. Patient has also been applying lidocaine gel to his neck. Patient feels the lidocaine gel is helpful but relief is short lived. Patient is unsure if the tizanadine is helping. Patient is also taking Lyrica 100mg every morning and 150mg every evening. Patient is hoping to decrease his dose to 100mg twice daily.     Mood/Anxiety: current therapy includes lamictal 200mg daily, Xanax XR 1mg every  Day,   risperidone 0.5mg daily. Patient sees his therapist every week. Patient is seeing a new psychiatrist, Dr. Alegria with [facility] in [place] in February. Patient's dose of Xanax was increased at his last appointment as well as an increase in his hydroxyzine and decrease in risperidone. Patient feels like his mood has worsened and discussed this with his therapist today. Patient is also attending group sessions weekly for 2 hours.     Migraine:  Current therapy includes rizatriptan 5mg MLT patient has not had to take very many of them since he has stopped TMS maybe once every 10 days. Patient finds this to be very effective.       Hypertension: Current medications include metoprolol XL 400mg daily, ramipril 10mg daily.  Patient does not self-monitor BP but has a machine.  Patient reports no current medication side effects.    Hyperlipidemia: Current therapy includes rosuvastatin 40mg once daily  patient's new insurance does not cover Lovaza they cover Vascepa but the copay for Vascepa is $150 a month so he will not pick it up. Currently, patient is taking Lovaza 2gm in once daily along with OTC fish oil that is the same strength as Lovaza. Patient will use up the rest of his Lovaza (has at least a month left) and then switch over to OTC entirely.  Pt reports no significant myalgias or other side effects.   The 10-year ASCVD risk score (Vanna MOISE Jr., et al., 2013) is: 4%    Values used to calculate the score:      Age: 43 years      Sex: Male      Is Non- : No      Diabetic: Yes      Tobacco smoker: No      Systolic Blood Pressure: 118 mmHg      Is BP treated: Yes      HDL Cholesterol: 34 mg/dL      Total Cholesterol: 152 mg/dL     Nausea: current therapy includes deya root 550mg up to three times daily and ondansetron 8 mg prn. Patient will take deya root first and then if symptoms persist will take ondansetron. Patient feels his nausea has been worse over the last 7-10  days.    Diabetes:  Pt currently taking metformin XR 500mg-patient takes 2 tablets daily. Pt is experiencing the following side effects: nausea?  SMBG: once times a week.   Ranges (patient reported): 125mg/dL.  Patient is not experiencing hypoglycemia  Recent symptoms of high blood sugar? none  Eye exam: up to date  Foot exam: up to date  ACEi/ARB: Yes: ramipril.   Urine Albumin:   Lab Results   Component Value Date    UMALCR Unable to calculate due to low value 12/24/2018      Aspirin: Not taking due to age    ASSESSMENT:                             Current medications were reviewed today.     Medication Adherence: no issues identified    Asthma: Stable    Ankylosing Spondylitis/Pain: Needs improvement. Patient may benefit from having Narcan on hand. Patient is at higher risk for respiratory depression due to BZS, opioid and muscle relaxant. Patient would also benefit from only using muscle relaxant as needed.    Hypertension: Stable    Mood/Anxiety: Stable.  Patient in close follow-up with psychiatry.    Migraine: Stable    Hyperlipidemia: Stable. Patient will need updated lipid labs 4-6 weeks after being on OTC fish oil    Nausea: Stable.    Diabetes: Stable. Metformin may be causing nausea. Continue to monitor.    PLAN:                            Could consider Narcan. Will send a request to Dr. Lyles.  Recommend using tizanadine as needed.    I spent 30 minutes with this patient today. All changes were made via collaborative practice agreement with Ksenia Lyles. A copy of the visit note was provided to the patient's primary care provider.    Will follow up in 8 weeks.    The patient was sent via [employer] a summary of these recommendations as an after visit summary.     Radha Mcdonald, Pharm.D, BCACP  Medication Therapy Management Pharmacist

## 2019-02-13 ENCOUNTER — TELEPHONE (OUTPATIENT)
Dept: RHEUMATOLOGY | Facility: CLINIC | Age: 44
End: 2019-02-13

## 2019-02-13 DIAGNOSIS — M45.8 ANKYLOSING SPONDYLITIS OF SACRAL REGION (H): Primary | ICD-10-CM

## 2019-02-13 NOTE — TELEPHONE ENCOUNTER
Patient received Simponi infusion yesterday (#3) and developed pruritus, hives, nausea, dizziness and drowsiness about 3 hours afterwards. Patient used his epi pen and went to the ED. He was discharged with a prescription for prednisone 40mg x 4 days and vistaril QID. He is also taking pepcid and an OTC antidiarrheal. Patient is feeling a little bit better today however still has some nausea and diarrhea. Denies SOB/tongue swelling. Patient would like to continue taking Simponi and also wants to make sure he is on the correct medications for his reaction.     Neal Amanda RN....2/13/2019 10:47 AM

## 2019-02-13 NOTE — TELEPHONE ENCOUNTER
Patient calling. He had a bad reaction to the infusion yesterday and had to go to the [facility].     Please call with any questions, or what he should do next.

## 2019-02-13 NOTE — TELEPHONE ENCOUNTER
Rheumatology Telephone Note:    I called and spoke with Mr. Pineda.  Hives, nausea, dizziness, drowsiness with last Simponi. Tolerated previous simponi infusion and remicade.  Will therefore stop simponi and change to alternative TNF inhibitor: Humira. First Humira dose to be no sooner than 4/9/2019.    # Adalimumab (Humira) Risks and Benefits: The risks and benefits of adalimumab were discussed in detail and the patient verbalized understanding.  The risks discussed include, but are not limited to, the risk for hypersensitivity, anaphylaxis, anaphylactoid reactions, an increased risk for serious infections leading to hospitalization or death, a possible increased risk for lymphoma and other malignancies, a possible worsening of demyelinating diseases, a possible worsening of heart failure, risk for cytopenias, risk for drug induced lupus, possible reactivation of hepatitis B, and possible reactivation of latent tuberculosis.  Subcutaneous injections may result in injection site reactions and/or pain at the site of injection.  The most common adverse reactions are infections, injection site reactions, headache, and rash.  It was discussed that the medication would need to be discontinued if a serious infection develops.  It was discussed that live vaccinations should not be received while using adalimumab or within 30 days prior to starting adalimumab.  I encouraged reviewing the package insert and asking any questions about the medication.      All questions were answered and he thanked me for the call.     Oneil Baxter MD  2/13/2019 4:56 PM

## 2019-02-14 ENCOUNTER — TELEPHONE (OUTPATIENT)
Dept: RHEUMATOLOGY | Facility: CLINIC | Age: 44
End: 2019-02-14

## 2019-02-14 NOTE — TELEPHONE ENCOUNTER
PA Initiation    Medication: Humira - PA Initiated  Insurance Company: [employer] Part D - Phone [phone] Fax [phone]  Pharmacy Filling the Rx: [facility] - [place], [place] - [facility] [address]  Filling Pharmacy Phone:    Filling Pharmacy Fax:    Start Date: 2/14/2019

## 2019-02-14 NOTE — TELEPHONE ENCOUNTER
Prior Authorization Approval    Authorization Effective Date: 2/14/2019  Authorization Expiration Date: 2/13/2022  Medication: Humira - PA Approved  Approved Dose/Quantity:  Reference #: [id number]   Insurance Company: [employer] Part D - Phone [phone] Fax [phone]  Expected CoPay: 1501.03     CoPay Card Available: No    Foundation Assistance Needed:    Which Pharmacy is filling the prescription (Not needed for infusion/clinic administered): [facility] - [place], [place] - [facility] [address]  Pharmacy Notified: Yes  Patient Notified: Yes

## 2019-02-15 NOTE — TELEPHONE ENCOUNTER
RECORDS STATUS - ALL OTHER DIAGNOSIS      RECORDS RECEIVED FROM: [facility] - in [facility] (internal referral)   DATE RECEIVED: 2/15/19   NOTES STATUS DETAILS   OFFICE NOTE from referring provider 2/12/19 In [facility]     OFFICE NOTE from medical oncologist None None     DISCHARGE SUMMARY from hospital In [facility] In [facility]     DISCHARGE REPORT from the ER I2/12/19 In [facility]     OPERATIVE REPORT In [facility]     MEDICATION LIST In Epic In [facility]     CLINICAL TRIAL TREATMENTS TO DATE In [facility] In [facility]     LABS     PATHOLOGY REPORTS In [facility] In Epic     ANYTHING RELATED TO DIAGNOSIS In Epic In Epic     GENONOMIC TESTING     TYPE: None None   IMAGING (NEED IMAGES & REPORT)     CT SCANS 2/6/19 Chest  12/22/18 Chest Abd  9/5/18 Chest PE  5/17/18 Chest PE  1/31/17 Chest     In [facility]  [facility] - in Epic/CE  [facility] - in Epic/CE  [facility] - In Epic/CE  In Epic       MRI 12/07/16 Lumbar Spine In Epic   MAMMO None None   ULTRASOUND 9/2/16 Renal In [facility]   PET None None

## 2019-02-18 ENCOUNTER — PRE VISIT (OUTPATIENT)
Dept: PULMONOLOGY | Facility: CLINIC | Age: 44
End: 2019-02-18

## 2019-02-18 ENCOUNTER — OFFICE VISIT (OUTPATIENT)
Dept: PULMONOLOGY | Facility: CLINIC | Age: 44
End: 2019-02-18
Payer: MEDICARE

## 2019-02-18 VITALS
WEIGHT: 315 LBS | HEART RATE: 84 BPM | DIASTOLIC BLOOD PRESSURE: 91 MMHG | OXYGEN SATURATION: 98 % | BODY MASS INDEX: 37.19 KG/M2 | RESPIRATION RATE: 18 BRPM | HEIGHT: 77 IN | SYSTOLIC BLOOD PRESSURE: 143 MMHG

## 2019-02-18 DIAGNOSIS — R91.8 PULMONARY NODULES: Primary | ICD-10-CM

## 2019-02-18 PROCEDURE — 99204 OFFICE O/P NEW MOD 45 MIN: CPT | Performed by: INTERNAL MEDICINE

## 2019-02-18 ASSESSMENT — PAIN SCALES - GENERAL: PAINLEVEL: NO PAIN (0)

## 2019-02-18 ASSESSMENT — MIFFLIN-ST. JEOR: SCORE: 2535.54

## 2019-02-18 NOTE — NURSING NOTE
"Joel Pineda's goals for this visit include: Consult  He requests these members of his care team be copied on today's visit information: PCP    PCP: Ksenia Lyles    Referring Provider:  Ksenia Lyles MD  [address] [facility]  [place] [address]    BP (!) 143/91   Pulse 84   Resp 18   Ht 1.956 m (6' 5\")   Wt (!) 153.3 kg (338 lb)   SpO2 98%   BMI 40.08 kg/m      Do you need any medication refills at today's visit? N    "

## 2019-02-18 NOTE — PROGRESS NOTES
LUNG NODULE & INTERVENTIONAL PULMONARY CLINIC  CLINICS & SURGERY [facility]     Joel Pineda MRN# [id number]   Age: 43 year old YOB: 1975     Reason for Consultation: lung nodule(s)    Requesting Physician: Ksenia Lyles MD  [address] [place] [address]       Assessment and Plan:    1. Bilateral sub7mm nodules with calcified hilar LN c/w old granulomatous disease. Has had stability in these nodules for 2-years. No further f/u required per Fleischner criteria.     2. Asthma. Mild-intermittent. Controlled on current regimen. Up-to-date on flu and PNA vaccination. No previous acute exacerbation requiring hospitalization, ED or urgent care. Reasonable to continue current regimen while his biologic regimen is being optimized with consistency. At that point, we can try to down-step his inhalers. Humera will be trialed in April, therefore will get pulm eval in May.     Billing: The patient was seen and examined by me and the findings, assessment, and plan as documented was explained to the patient/family who expressed understand.     Farhat Barker MD   of Medicine  Interventional Pulmonology  Department of Pulmonary, Allergy, Critical Care and Sleep Medicine   [facility]  Pager: [phone]          History:     Joel Pineda is a 43 year old male with sig h/o for asthma, ankylosing spondylitis, hypothyroidism, HTN, DM2 and past PE who is here for evaluation/followup of lung nodule(s).    - No new resp sx or complaints. Denies dyspnea or cough.   - Had CT chest to f/u nodules.   - Personal hx of cancer: No.   - Family hx of cancer: No lung cancer.   - Exposure hx: Denies asbestos or radon exposure   - Tobacco hx: Never smoker   - My interpretation of the images relevant for this visit includes: nodules   - My interpretation of the PFT's relevant for this visit includes: Normal     Culprit  Nodule(s):   Multiple solid pulmonary nodules in both lungs are stable since at  least 1/31/2017, the largest measuring 7 mm in the right lower lobe.    Other active medical problems include:   - Had exercised induced asthma in high school. Apparently had exacerbation while receiving remicade and has been on controlled on advair, singulair and prn albuterol for 2yrs. Up-to-date on flu and PNA vaccinations. No recent acute exacerbations.   - Has hypothyroidism. Stable.    - Has HTN and DM2. Stable.   - Has h/o PE. No current anticoagulation   - Has non-specified mood disorder on lamictal.    - Has ankylosing spondylitis. Stable, following with rheumatology for humera          Past Medical History:      Past Medical History:   Diagnosis Date     Acne      Chest pain     Chest pain, regulated w/BP meds. Clear arteries.     Skin exam, screening for cancer 12/3/2013           Past Surgical History:      Past Surgical History:   Procedure Laterality Date     BACK SURGERY  10/07    lumbar discectomy L5-S1     COLONOSCOPY      Note: colonoscopy scheduled with [facility] on Friday, 9/4/15     COSMETIC SURGERY  2012    Nose Exterior - functional     GI SURGERY  August 2013    Sigmoidectomy     HERNIA REPAIR, UMBILICAL  8/23/11    Dr. Evan whiting     INCISION AND DRAINAGE, ABSCESS, COMPLEX  8/23/11    umbilical, Dr. Evan Beavers     LAPAROSCOPIC ASSISTED COLECTOMY LEFT (DESCENDING)  8/15/2013    Procedure: LAPAROSCOPIC ASSISTED COLECTOMY LEFT (DESCENDING);  Laparoscopic Hand Assisted Sigmoid Resection, Mobilization of Splenic Fissure, coloproctoscopy, *Latex Free Room* Anesthesia General with Pain block  ;  Surgeon: Aurora Justice MD;  Location: [facility] OR     NERVE SURGERY  8/18/11    RF ablation @ L3-S1 @ [facility]     RECONSTRUCT NOSE AND SEPTUM (FUNCTIONAL)  10/14/2011    Procedure:RECONSTRUCT NOSE AND SEPTUM (FUNCTIONAL); Functional Septorhinoplasty, Turbinate Reduction, ; Surgeon:CEDRIC CUEVAS; Location:[facility] OR     SINUS  "SURGERY  10/1/01    ethmoidectomy chronic sinusitis          Social History:     Social History     Tobacco Use     Smoking status: Never Smoker     Smokeless tobacco: Never Used   Substance Use Topics     Alcohol use: No     Alcohol/week: 0.0 oz          Family History:     Family History   Problem Relation Age of Onset     Musculoskeletal Disorder Mother         back     Anxiety Disorder Mother      Colon Polyps Mother      Ulcerative Colitis Mother         and ischemic small intestine, surgery     Hypertension Mother      Breast Cancer Mother      Osteoporosis Mother      Diabetes Mother         Type 2, Diagnosed in 2014     Depression Mother         Takes Cymbalta to help with chronic pain + depx     Thyroid Disease Mother         Hypothyroidism     Obesity Mother         Under much better control latter half of 2015     Musculoskeletal Disorder Father         back     Substance Abuse Father      Hypertension Father      Hyperlipidemia Father      Depression Father         Off meds for many years. Seems \"ok\"     Heart Disease Maternal Grandmother      Heart Disease Maternal Grandfather      Psychotic Disorder Paternal Grandfather      Suicide Paternal Grandfather      Depression Paternal Grandfather         Pediatrician. Committed suicide by pistol in 1990.     Musculoskeletal Disorder Brother         back     Depression Brother         Expressed as anger and moodiness     Substance Abuse Sister      Depression Sister         Mental Health Therapist, yet no anti-depressants?     Anxiety Disorder Sister         Mental Health Therapist, yet no anti-anxiety meds?     Other Cancer Other         Bladder Cancer - Fatal     Substance Abuse Brother      Colon Cancer No family hx of      Crohn's Disease No family hx of      Anesthesia Reaction No family hx of      Cancer No family hx of         No family history of skin cancer           Allergies:      Allergies   Allergen Reactions     Banana Shortness Of Breath     Pt " reports organic Banana is okay.      Nitroglycerin Palpitations     Penicillins Anaphylaxis     Provigil [Modafinil] Shortness Of Breath     headache     Ciprofloxacin Palpitations, Other (See Comments) and Rash     dizziness (versus metronidazole taken at same time)     Gadolinium Hives and Itching     Patient was premedicated for the contrast allergy. He did still have a reaction a few hours after injection. Hives and itching. Dr. Gomez told tech to inform pt he should only have contrast again in the future when premedicated and at a hospital. Not at an outpatient facility.      Dulera Other (See Comments)     Hoarse voice     Dye [Contrast Dye] Other (See Comments) and Hives     Moderate flushing, CT contrast     Neurontin [Gabapentin] Hives     Moderate hives     Nortriptyline Hives     Varicella Virus Vaccine Live      Rash     Ciprofloxacin Palpitations     Flagyl [Metronidazole Hcl] Palpitations and Hives     Latex Rash     Metronidazole Palpitations, Other (See Comments) and Rash     dizziness (versus ciprofloxacin taken at same time)     No Clinical Screening - See Comments Rash     Nitrile gloves     .  Current Outpatient Medications   Medication     acetaminophen 500 MG CAPS     [START ON 4/9/2019] adalimumab (HUMIRA *CF*) 40 MG/0.4ML pen kit     albuterol (PROAIR HFA/PROVENTIL HFA/VENTOLIN HFA) 108 (90 BASE) MCG/ACT Inhaler     ALPRAZolam (XANAX XR) 1 MG 24 hr tablet     ASPIRIN NOT PRESCRIBED (INTENTIONAL)     blood glucose monitoring (NO BRAND SPECIFIED) meter device kit     celecoxib (CELEBREX) 200 MG capsule     cetirizine (ZYRTEC) 10 MG tablet     cyanocobalamin (VITAMIN B12) 1000 MCG/ML injection     EPINEPHrine (EPIPEN/ADRENACLICK/OR ANY BX GENERIC EQUIV) 0.3 MG/0.3ML injection 2-pack     fluticasone-salmeterol (ADVAIR DISKUS) 250-50 MCG/DOSE diskus inhaler     [place], Zingiber officinalis, (LIBIA ROOT) 550 MG CAPS capsule     golimumab (SIMPONI ARIA) 50 MG/4ML injection     hydrOXYzine  (ATARAX) 50 MG tablet     lamoTRIgine (LAMICTAL) 100 MG tablet     levothyroxine (SYNTHROID/LEVOTHROID) 75 MCG tablet     lidocaine (XYLOCAINE) 5 % external ointment     loperamide (IMODIUM) 2 MG capsule     metFORMIN (GLUCOPHAGE-XR) 500 MG 24 hr tablet     metoprolol succinate ER (TOPROL-XL) 200 MG 24 hr tablet     montelukast (SINGULAIR) 10 MG tablet     omeprazole 20 MG tablet     ondansetron (ZOFRAN) 8 MG tablet     order for DME     oxyCODONE (ROXICODONE) 5 MG tablet     pregabalin (LYRICA) 150 MG capsule     pseudoePHEDrine (SUDAFED) 120 MG 12 hr tablet     ramipril (ALTACE) 10 MG capsule     risperiDONE (RISPERDAL) 0.5 MG tablet     rizatriptan (MAXALT-MLT) 5 MG ODT tab     rosuvastatin (CRESTOR) 40 MG tablet     syringe, disposable, (BD TUBERCULIN SYRINGE) 1 ML MISC     tiZANidine (ZANAFLEX) 4 MG tablet     vitamin C (ASCORBIC ACID) 500 MG tablet     vitamin D3 (CHOLECALCIFEROL) 22241 UNITS capsule     No current facility-administered medications for this visit.                  Review of Systems:     CONSTITUTIONAL: negative for fever, chills, change in weight  INTEGUMENTARY/SKIN: no rash or obvious new lesions  ENT/MOUTH: no sore throat, new sinus pain or nasal drainage  RESP: see interval history  CV: negative for chest pain, palpitations or peripheral edema  GI: no nausea, vomiting, change in stools  : no dysuria  MUSCULOSKELETAL: no myalgias, arthralgias  ENDOCRINE: blood sugars with adequate control  PSYCHIATRIC: mood stable  LYMPHATIC: no new lymphadenopathy  HEME: no bleeding or easy bruisability  NEURO: no numbness, weakness, headaches         Physical Exam:     Pulse:  [84] 84  Resp:  [18] 18  BP: (143)/(91) 143/91  SpO2:  [98 %] 98 %  Wt Readings from Last 4 Encounters:   02/18/19 (!) 153.3 kg (338 lb)   02/12/19 (!) 153.6 kg (338 lb 9.6 oz)   01/30/19 (!) 153.3 kg (338 lb)   01/23/19 (!) 153.5 kg (338 lb 6.4 oz)     Constitutional:   Awake, alert and in no apparent distress     Eyes:    Nonicteric, JEIMY     ENT:    Trachea is midline. No gross neck abnormalities      Neck:   Supple without supraclavicular or cervical lymphadenopathy     Lungs:   Good air flow.  No crackles. No rhonchi.  No wheezes.     Cardiovascular:   Normal S1 and S2.  RRR.  No murmur, gallop or rub.  Radial, DP and PT pulses normal and symmetric     Abdomen:   NABS, soft, nontender, nondistended.  No HSM.     Musculoskeletal:   No edema.      Neurologic:   Alert and conversant. Cranial nerves  intact.       Skin:   Warm, dry.  No rash on limited exam.           Current Laboratory Data:   All laboratory and imaging data reviewed.    No results found. However, due to the size of the patient record, not all encounters were searched. Please check Results Review for a complete set of results.         Previous Pulmonary Function Testing     FVC-Pred   Date Value Ref Range Status   01/31/2019 6.26 L    12/12/2014 6.36 L      FVC-Pre   Date Value Ref Range Status   01/31/2019 5.38 L    12/12/2014 5.40 L      FVC-%Pred-Pre   Date Value Ref Range Status   01/31/2019 85 %    12/12/2014 84 %      FEV1-Pre   Date Value Ref Range Status   01/31/2019 4.40 L    12/12/2014 4.42 L      FEV1-%Pred-Pre   Date Value Ref Range Status   01/31/2019 89 %    12/12/2014 88 %      FEV1FVC-Pred   Date Value Ref Range Status   01/31/2019 79 %    12/12/2014 79 %      FEV1FVC-Pre   Date Value Ref Range Status   01/31/2019 82 %    12/12/2014 82 %      No results found for: 20029  FEFMax-Pred   Date Value Ref Range Status   01/31/2019 11.44 L/sec    12/12/2014 11.55 L/sec      FEFMax-Pre   Date Value Ref Range Status   01/31/2019 12.02 L/sec    12/12/2014 11.89 L/sec      FEFMax-%Pred-Pre   Date Value Ref Range Status   01/31/2019 105 %    12/12/2014 102 %      ExpTime-Pre   Date Value Ref Range Status   01/31/2019 6.60 sec    12/12/2014 7.05 sec      FIFMax-Pre   Date Value Ref Range Status   01/31/2019 9.80 L/sec    12/12/2014 6.92 L/sec      FEV1FEV6-Pred    Date Value Ref Range Status   01/31/2019 81 %    12/12/2014 82 %      FEV1FEV6-Pre   Date Value Ref Range Status   01/31/2019 82 %    12/12/2014 82 %      No results found for: 20055         Previous Chest Imaging   No images are attached to the encounter.  No images are attached to the encounter or orders placed in the encounter.         Previous Cardiology Imaging   No results found for this or any previous visit (from the past 8760 hour(s)).

## 2019-02-21 NOTE — TELEPHONE ENCOUNTER
Free Drug Application Initiated  Medication Humira  [employer]  Phone # [phone]  Fax # [phone]  Additional Information Application mailed to pt. Will update encounter when submitted to mfg.

## 2019-02-22 ENCOUNTER — OFFICE VISIT (OUTPATIENT)
Dept: NEUROLOGY | Facility: CLINIC | Age: 44
End: 2019-02-22
Payer: MEDICARE

## 2019-02-22 ENCOUNTER — MYC REFILL (OUTPATIENT)
Dept: FAMILY MEDICINE | Facility: CLINIC | Age: 44
End: 2019-02-22

## 2019-02-22 VITALS
DIASTOLIC BLOOD PRESSURE: 81 MMHG | SYSTOLIC BLOOD PRESSURE: 123 MMHG | BODY MASS INDEX: 37.19 KG/M2 | OXYGEN SATURATION: 95 % | HEART RATE: 75 BPM | HEIGHT: 77 IN | WEIGHT: 315 LBS

## 2019-02-22 DIAGNOSIS — M51.369 DDD (DEGENERATIVE DISC DISEASE), LUMBAR: ICD-10-CM

## 2019-02-22 DIAGNOSIS — G63 POLYNEUROPATHY ASSOCIATED WITH UNDERLYING DISEASE (H): Primary | ICD-10-CM

## 2019-02-22 DIAGNOSIS — M45.8 ANKYLOSING SPONDYLITIS OF SACRAL REGION (H): ICD-10-CM

## 2019-02-22 DIAGNOSIS — G89.4 CHRONIC PAIN SYNDROME: Chronic | ICD-10-CM

## 2019-02-22 PROCEDURE — 86255 FLUORESCENT ANTIBODY SCREEN: CPT | Mod: 90 | Performed by: PSYCHIATRY & NEUROLOGY

## 2019-02-22 PROCEDURE — 83520 IMMUNOASSAY QUANT NOS NONAB: CPT | Mod: 90 | Performed by: PSYCHIATRY & NEUROLOGY

## 2019-02-22 PROCEDURE — 00000402 ZZHCL STATISTIC TOTAL PROTEIN: Performed by: PSYCHIATRY & NEUROLOGY

## 2019-02-22 PROCEDURE — 84165 PROTEIN E-PHORESIS SERUM: CPT | Performed by: PSYCHIATRY & NEUROLOGY

## 2019-02-22 PROCEDURE — 99205 OFFICE O/P NEW HI 60 MIN: CPT | Performed by: PSYCHIATRY & NEUROLOGY

## 2019-02-22 PROCEDURE — 99000 SPECIMEN HANDLING OFFICE-LAB: CPT | Performed by: PSYCHIATRY & NEUROLOGY

## 2019-02-22 PROCEDURE — 83519 RIA NONANTIBODY: CPT | Mod: 59 | Performed by: PSYCHIATRY & NEUROLOGY

## 2019-02-22 PROCEDURE — 86618 LYME DISEASE ANTIBODY: CPT | Performed by: PSYCHIATRY & NEUROLOGY

## 2019-02-22 PROCEDURE — 36415 COLL VENOUS BLD VENIPUNCTURE: CPT | Performed by: PSYCHIATRY & NEUROLOGY

## 2019-02-22 ASSESSMENT — MIFFLIN-ST. JEOR: SCORE: 2538.26

## 2019-02-22 ASSESSMENT — PAIN SCALES - GENERAL: PAINLEVEL: NO PAIN (0)

## 2019-02-22 NOTE — PATIENT INSTRUCTIONS
1.  EMG  2.  Blood work for other causes of neuropathy  3. We discussed peripheral neuropathy care    Comfortable shoes and soft socks    Rub and examine feet once a week looking for sores    Never go barefoot    Have a professional cut your toenails  4.  See back with above

## 2019-02-22 NOTE — NURSING NOTE
"Joel Pineda's goals for this visit include: consult  He requests these members of his care team be copied on today's visit information:     PCP: Ksenia Lyles    Referring Provider:  Ksenia Lyles MD  [address] [place] [address]    /81   Pulse 75   Ht 1.956 m (6' 5\")   Wt (!) 153.6 kg (338 lb 9.6 oz)   SpO2 95%   BMI 40.15 kg/m      Do you need any medication refills at today's visit? n  "

## 2019-02-22 NOTE — LETTER
2019         RE: Joel Pineda  [address] [facility] [place] [address]        Dear Colleague,    Thank you for referring your patient, Joel Pineda, to the [facility]. Please see a copy of my visit note below.    Department of Neurology  Movement Disorders Division   Initial Clinic Evaluation     Patient: Joel Pineda   MRN: [id number]   : 1975   Date of Visit: 2019     Referring Physician: Trupti    Reason for Referral:  Peripheral neuropathy    Impression:  1.  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  History of B12 deficiency on replacement  3.  Ankylosing spondylitis  4.  History of L5-S1 laminectomy    Recommendations:  1.  I discussed with Mr. Pineda that I think he has distal sensory loss is due to neuropathy and not to radiculopathy.  His peripheral neuropathy is clinically quite mild.  I think it is highly likely that this is due to his recent onset diabetes mellitus.  We discussed that the only known therapy for diabetic neuropathy is preventative.  Keeping the hemoglobin A1c at a level of 6 or below should minimize the progression of his neuropathy.  2.  We will check an EMG to make sure he does not have demyelinating neuropathy although this is very unlikely.  He is on a TNF alpha inhibitor which is associated with demyelinating neuropathy.  If nothing else the EMG will serve as a baseline as he continues on this medication.  3.  We will check some blood work to make sure there is no other reversible or inflammatory cause of his neuropathy.  4.  He was cautioned in the usual neuropathy care.  He should wear comfortable shoes and soft socks.  He should rub his feet with lotion weekly to make sure he has no new ulceration.  He should never go barefoot.  He should have a professional cut his toenails.  5.  I will see him back with results of his EMG and blood testing.    All questions were answered.         Please call or write with questions or concerns,    Sincerely yours,    Ray Malone MD      History of Present Illness  Mr. Pineda is a 43 year old male who is right-handed and disabled.  He is referred by Dr. Shady Lyles for evaluation of peripheral neuropathy.    In the fall 2007 the patient had an acute radiculopathy.  He cannot remember if this was unilateral or which side was involved.  At the time he was hospitalized at [facility].  He had 1 week of conservative care but his pain persisted.  He was felt secondary to an extruded disc.  He underwent an L5-S1 microdiscectomy with improvement.  Again he cannot recall which side this was.    Over the ensuing months he developed increasing low back pain.  Epidural steroid injections were of no benefit.  Eventually he was diagnosed with ankylosing spondylitis.  Treatment of this has been successful in his back pain is much improved.  He does not have sciatica at this time.  He has been treated by Dr. Baxter in rheumatology.  In septic September he started on Simponi which is a TNF alpha inhibitor.  This improved his ankylosing spondylitis think significantly.  However he recently developed a reaction to it and it has been discontinued.  Humira is planned as his next course of therapy.    In the distant past he was found to have B12 deficiency and this is been on parenteral replacement since.    In November 2018 he had a recurrence of what he felt was neuropathy pain.  He has burning and numbness over his feet from the toes up to the midfoot.  This is migratory through these areas.  The burning can be intense.  He can keep him up at night.  It is not associated with weakness or foot drop.  It is not associated with severe imbalance.  There is no numbness in his hands.  There is no facial numbness.  Again he has not had radicular pain which would correspond to this numbness.  The numbness is not postural in nature.  He is been taking Lyrica for his  back pain.  Recently the Lyrica was increased.  This along with institution of his metformin for type 2 diabetes is helped the pain and he feels he is now getting somewhat better.    His diabetes mellitus was only recently discovered.  His hemoglobin A1c is 6.7.  Again he started on metformin and lifestyle changes.    He has had EMGs in the past.  Both of them around 2007 I am having them scanned into his medical record.  The first EMG he had low peroneal compound motor action potentials but no other changes.  The second EMG the compound motor action potentials from peroneal nerve stimulation were normal.  There is really no other significant change except for absent medial plantar responses.  Specifically there was no clear signs of neuropathy.    The patient has no family history of neuropathy or foot deformity.  He has high arches.  He does not know of a family history of high arches or claw toes.  He does not drink alcohol.  Looking through his med list he is taking no other medicines which would cause neuropathy.      Past Medical History:   Past Medical History:   Diagnosis Date     Acne      Chest pain     Chest pain, regulated w/BP meds. Clear arteries.     Skin exam, screening for cancer 12/3/2013       Past Surgical History:   Past Surgical History:   Procedure Laterality Date     BACK SURGERY  10/07    lumbar discectomy L5-S1     COLONOSCOPY      Note: colonoscopy scheduled with [facility] on Friday, 9/4/15     COSMETIC SURGERY  2012    Nose Exterior - functional     GI SURGERY  August 2013    Sigmoidectomy     HERNIA REPAIR, UMBILICAL  8/23/11    smallDr. Evan     INCISION AND DRAINAGE, ABSCESS, COMPLEX  8/23/11    umbilical, Dr. Evan Beavers     LAPAROSCOPIC ASSISTED COLECTOMY LEFT (DESCENDING)  8/15/2013    Procedure: LAPAROSCOPIC ASSISTED COLECTOMY LEFT (DESCENDING);  Laparoscopic Hand Assisted Sigmoid Resection, Mobilization of Splenic Fissure, coloproctoscopy, *Latex Free Room* Anesthesia  General with Pain block  ;  Surgeon: Aurora Justice MD;  Location: [facility] OR     NERVE SURGERY  8/18/11    RF ablation @ L3-S1 @ [facility]     RECONSTRUCT NOSE AND SEPTUM (FUNCTIONAL)  10/14/2011    Procedure:RECONSTRUCT NOSE AND SEPTUM (FUNCTIONAL); Functional Septorhinoplasty, Turbinate Reduction, ; Surgeon:CEDRIC CUEVAS; Location:[facility] OR     SINUS SURGERY  10/1/01    ethmoidectomy chronic sinusitis       Medications:         Movement Disorder-related Medications                                                                                                                                                             Allergies: is allergic to banana; nitroglycerin; penicillins; provigil [modafinil]; ciprofloxacin; gadolinium; dulera; dye [contrast dye]; golimumab; neurontin [gabapentin]; nortriptyline; varicella virus vaccine live; ciprofloxacin; flagyl [metronidazole hcl]; latex; metronidazole; and no clinical screening - see comments.    Social History:   Social History     Socioeconomic History     Marital status: Single     Spouse name: None     Number of children: None     Years of education: None     Highest education level: None   Occupational History     Occupation:      Employer: [employer]     Occupation: paraprofessional     Comment: [employer]     Employer: [employer]   Social Needs     Financial resource strain: None     Food insecurity:     Worry: None     Inability: None     Transportation needs:     Medical: None     Non-medical: None   Tobacco Use     Smoking status: Never Smoker     Smokeless tobacco: Never Used   Substance and Sexual Activity     Alcohol use: No     Alcohol/week: 0.0 oz     Drug use: No     Sexual activity: No     Partners: Female   Lifestyle     Physical activity:     Days per week: None     Minutes per session: None     Stress: None   Relationships     Social connections:     Talks on phone: None     Gets together: None     Attends Spiritism service: None      "Active member of club or organization: None     Attends meetings of clubs or organizations: None     Relationship status: None     Intimate partner violence:     Fear of current or ex partner: None     Emotionally abused: None     Physically abused: None     Forced sexual activity: None   Other Topics Concern     Parent/sibling w/ CABG, MI or angioplasty before 65F 55M? No      Service Not Asked     Blood Transfusions Not Asked     Caffeine Concern Not Asked     Occupational Exposure Not Asked     Hobby Hazards Not Asked     Sleep Concern Yes     Stress Concern Yes     Weight Concern Not Asked     Special Diet Not Asked     Back Care Yes     Exercise Not Asked     Bike Helmet Not Asked     Seat Belt Yes     Self-Exams Not Asked   Social History Narrative     None       Family History:  Family History   Problem Relation Age of Onset     Musculoskeletal Disorder Mother         back     Anxiety Disorder Mother      Colon Polyps Mother      Ulcerative Colitis Mother         and ischemic small intestine, surgery     Hypertension Mother      Breast Cancer Mother      Osteoporosis Mother      Diabetes Mother         Type 2, Diagnosed in 2014     Depression Mother         Takes Cymbalta to help with chronic pain + depx     Thyroid Disease Mother         Hypothyroidism     Obesity Mother         Under much better control latter half of 2015     Musculoskeletal Disorder Father         back     Substance Abuse Father      Hypertension Father      Hyperlipidemia Father      Depression Father         Off meds for many years. Seems \"ok\"     Heart Disease Maternal Grandmother      Heart Disease Maternal Grandfather      Psychotic Disorder Paternal Grandfather      Suicide Paternal Grandfather      Depression Paternal Grandfather         Pediatrician. Committed suicide by pistol in 1990.     Musculoskeletal Disorder Brother         back     Depression Brother         Expressed as anger and moodiness     Substance Abuse " Sister      Depression Sister         Mental Health Therapist, yet no anti-depressants?     Anxiety Disorder Sister         Mental Health Therapist, yet no anti-anxiety meds?     Other Cancer Other         Bladder Cancer - Fatal     Substance Abuse Brother      Colon Cancer No family hx of      Crohn's Disease No family hx of      Anesthesia Reaction No family hx of      Cancer No family hx of         No family history of skin cancer           Neurologic  Visual loss: no, Double vision: no, Headache: no, Loss of consciousness:  no, Seizure: no, Fainting (syncope): no, Dysarthria: no, Dysphagia:  no, Vertigo:  no, Weakness: no, Atrophy: no, Twitches: no, Lhermitte's: no, Numbness or tingling: YES, Handwriting change: no, Tremors: YES, Involuntary movements: no, Imbalance: no, Abnormal gait:  no, Falls :no, Memory loss: no, RBD: no, Sleep disorder: no, Hallucination: no, Loss of concentration: no,  Behavioral change: no,  Loss of motivation: no      Comprehensive Neurologic Exam    Mental Status Exam   The patient is alert, oriented and exhibits no difficulty with cognition or memory.  A formal short test of mental status was performed.     Neurovascular         Speech and Language   Right Left     Carotid Bruit Absent Absent  Speech is normal.   Dorsalis Pedis Pulse Normal Normal  Description   Posterior Tibial Pulse Normal Normal  Language is normal.     Cranial Nerve Exam                 Right Left   Right Left   II                                        Normal Normal  Hearing (VIII) Normal Normal      III, IV, V!                   Normal Normal  Nystagmus (VIII) Normal Normal   EOM description                              Gag (IX, X) Normal Normal   Facial Sensation (V) Normal Normal  SCM (XI) Normal Normal   Muscles of Mastication (V) Normal Normal  Trapezius (XI) Normal Normal   Facial Strength (VII) Normal Normal  Tongue (XII) Normal Normal     Motor Exam  Strength (*/5 grading)  Muscle                  Right  Left  Muscle Right Left   Frontalis                                           Normal Normal  Iliopsoas Normal    Normal          Orbicularis Oculi                     Normal Normal  Quadrideps Normal    Normal        Orbicularis Oris                         Normal Normal  Anterior Tibial Normal Normal      Deltoid Normal Normal  Gastrocnemius Normal    Normal   Biceps Normal Normal  Extensor Hallucis Longus Normal    Normal   Triceps Normal Normal  Toe Extensors Normal    Normal   EDC Normal Normal  Toe Flexor Normal    Normal   Finger Flexors Normal Normal  Other Normal Normal   First Dorsal Interosseous Normal Normal  Other Normal Normal   Hypothenar Normal Normal  Other Normal Normal   Thenar Normal Normal  Other Normal Normal   Mild high arches      Reflexes      Tone   Right Left   Right Left   Biceps -3 -3  Spasticity (S)  Rigidity (R)     Triceps -3 -3  Neck     Brachioradialis -4 -4  Arm     Quadriceps -3 -3  Leg     Ankle -4 -4       Babkinski Flexor Flexor         AMR       Coordination   Right Left   Right Left   Fingers Normal Normal  Finger nose finger Normal Normal   Hand Normal Normal  Drift Normal Normal   Leg Normal Normal  Heel Rosario Normal Normal   Foot Normal Normal  Other     Other               Involuntary Movements    Gait and Station  None            Right Left  Stand & Sit Normal   (Movement type) Normal Normal  Gait Normal   (Movement type) Normal Normal  Tandem Normal   (Movement type) Normal Normal  Romberg Absent       Sensory Exam          Right Left    Pin to midfoot -3 -3    Vibration  toes -3 -3    Joint position Normal Normal    Other             Coding statement:     Duration of  Services: face-to-face70min.   Greater than 50% of this visit was spent in counseling and coordination of care.    Medical decision making is high due to the following components:    Number of diagnoses:[id number]  Management:Diagnostic tests orders or reviewed.Yes  Medications were prescribed.No  Medications were adjusted.No  Complexity of medical data:Outside records reviewed.Yes Radiology images reviewed by myself.Yes Review/order clinical lab tests. Yes Review/order radiologyNo Discussed tests with performing physician. No Called outside records.No Discussed patient with another provider.No Took collateral history.NoRiskOne or more chronic illnesses with severe exacerbation, progression, or side effects of treatment.YesAcute or chronic illness or injury that poses a threat to life or bodily function.Yes  Abrupt change in neurologic status.No                       Again, thank you for allowing me to participate in the care of your patient.        Sincerely,        Ray Malone MD

## 2019-02-22 NOTE — PROGRESS NOTES
Department of Neurology  Movement Disorders Division   Initial Clinic Evaluation     Patient: Joel Pineda   MRN: [id number]   : 1975   Date of Visit: 2019     Referring Physician: Trupti    Reason for Referral:  Peripheral neuropathy    Impression:  1.  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  History of B12 deficiency on replacement  3.  Ankylosing spondylitis  4.  History of L5-S1 laminectomy    Recommendations:  1.  I discussed with Mr. Pineda that I think he has distal sensory loss is due to neuropathy and not to radiculopathy.  His peripheral neuropathy is clinically quite mild.  I think it is highly likely that this is due to his recent onset diabetes mellitus.  We discussed that the only known therapy for diabetic neuropathy is preventative.  Keeping the hemoglobin A1c at a level of 6 or below should minimize the progression of his neuropathy.  2.  We will check an EMG to make sure he does not have demyelinating neuropathy although this is very unlikely.  He is on a TNF alpha inhibitor which is associated with demyelinating neuropathy.  If nothing else the EMG will serve as a baseline as he continues on this medication.  3.  We will check some blood work to make sure there is no other reversible or inflammatory cause of his neuropathy.  4.  He was cautioned in the usual neuropathy care.  He should wear comfortable shoes and soft socks.  He should rub his feet with lotion weekly to make sure he has no new ulceration.  He should never go barefoot.  He should have a professional cut his toenails.  5.  I will see him back with results of his EMG and blood testing.    All questions were answered.        Please call or write with questions or concerns,    Sincerely yours,    Ray Malone MD      History of Present Illness  Mr. Pineda is a 43 year old male who is right-handed and disabled.  He is referred by Dr. Shady Lyles for evaluation of  peripheral neuropathy.    In the fall 2007 the patient had an acute radiculopathy.  He cannot remember if this was unilateral or which side was involved.  At the time he was hospitalized at [facility].  He had 1 week of conservative care but his pain persisted.  He was felt secondary to an extruded disc.  He underwent an L5-S1 microdiscectomy with improvement.  Again he cannot recall which side this was.    Over the ensuing months he developed increasing low back pain.  Epidural steroid injections were of no benefit.  Eventually he was diagnosed with ankylosing spondylitis.  Treatment of this has been successful in his back pain is much improved.  He does not have sciatica at this time.  He has been treated by Dr. Baxter in rheumatology.  In septic September he started on Simponi which is a TNF alpha inhibitor.  This improved his ankylosing spondylitis think significantly.  However he recently developed a reaction to it and it has been discontinued.  Humira is planned as his next course of therapy.    In the distant past he was found to have B12 deficiency and this is been on parenteral replacement since.    In November 2018 he had a recurrence of what he felt was neuropathy pain.  He has burning and numbness over his feet from the toes up to the midfoot.  This is migratory through these areas.  The burning can be intense.  He can keep him up at night.  It is not associated with weakness or foot drop.  It is not associated with severe imbalance.  There is no numbness in his hands.  There is no facial numbness.  Again he has not had radicular pain which would correspond to this numbness.  The numbness is not postural in nature.  He is been taking Lyrica for his back pain.  Recently the Lyrica was increased.  This along with institution of his metformin for type 2 diabetes is helped the pain and he feels he is now getting somewhat better.    His diabetes mellitus was only recently discovered.  His hemoglobin A1c  is 6.7.  Again he started on metformin and lifestyle changes.    He has had EMGs in the past.  Both of them around 2007 I am having them scanned into his medical record.  The first EMG he had low peroneal compound motor action potentials but no other changes.  The second EMG the compound motor action potentials from peroneal nerve stimulation were normal.  There is really no other significant change except for absent medial plantar responses.  Specifically there was no clear signs of neuropathy.    The patient has no family history of neuropathy or foot deformity.  He has high arches.  He does not know of a family history of high arches or claw toes.  He does not drink alcohol.  Looking through his med list he is taking no other medicines which would cause neuropathy.      Past Medical History:   Past Medical History:   Diagnosis Date     Acne      Chest pain     Chest pain, regulated w/BP meds. Clear arteries.     Skin exam, screening for cancer 12/3/2013       Past Surgical History:   Past Surgical History:   Procedure Laterality Date     BACK SURGERY  10/07    lumbar discectomy L5-S1     COLONOSCOPY      Note: colonoscopy scheduled with [facility] on Friday, 9/4/15     COSMETIC SURGERY  2012    Nose Exterior - functional     GI SURGERY  August 2013    Sigmoidectomy     HERNIA REPAIR, UMBILICAL  8/23/11    Dr. Evan whiting     INCISION AND DRAINAGE, ABSCESS, COMPLEX  8/23/11    umbilical, Dr. Evan Beavers     LAPAROSCOPIC ASSISTED COLECTOMY LEFT (DESCENDING)  8/15/2013    Procedure: LAPAROSCOPIC ASSISTED COLECTOMY LEFT (DESCENDING);  Laparoscopic Hand Assisted Sigmoid Resection, Mobilization of Splenic Fissure, coloproctoscopy, *Latex Free Room* Anesthesia General with Pain block  ;  Surgeon: Aurora Justice MD;  Location: [facility] OR     NERVE SURGERY  8/18/11    RF ablation @ L3-S1 @ [facility]     RECONSTRUCT NOSE AND SEPTUM (FUNCTIONAL)  10/14/2011    Procedure:RECONSTRUCT NOSE AND SEPTUM (FUNCTIONAL); Functional  Septorhinoplasty, Turbinate Reduction, ; Surgeon:CEDRIC CUEVAS; Location:[facility] OR     SINUS SURGERY  10/1/01    ethmoidectomy chronic sinusitis       Medications:         Movement Disorder-related Medications                                                                                                                                                             Allergies: is allergic to banana; nitroglycerin; penicillins; provigil [modafinil]; ciprofloxacin; gadolinium; dulera; dye [contrast dye]; golimumab; neurontin [gabapentin]; nortriptyline; varicella virus vaccine live; ciprofloxacin; flagyl [metronidazole hcl]; latex; metronidazole; and no clinical screening - see comments.    Social History:   Social History     Socioeconomic History     Marital status: Single     Spouse name: None     Number of children: None     Years of education: None     Highest education level: None   Occupational History     Occupation:      Employer: [employer]     Occupation: paraprofessional     Comment: [employer]     Employer: [employer]   Social Needs     Financial resource strain: None     Food insecurity:     Worry: None     Inability: None     Transportation needs:     Medical: None     Non-medical: None   Tobacco Use     Smoking status: Never Smoker     Smokeless tobacco: Never Used   Substance and Sexual Activity     Alcohol use: No     Alcohol/week: 0.0 oz     Drug use: No     Sexual activity: No     Partners: Female   Lifestyle     Physical activity:     Days per week: None     Minutes per session: None     Stress: None   Relationships     Social connections:     Talks on phone: None     Gets together: None     Attends Voodoo service: None     Active member of club or organization: None     Attends meetings of clubs or organizations: None     Relationship status: None     Intimate partner violence:     Fear of current or ex partner: None     Emotionally abused: None     Physically abused: None      "Forced sexual activity: None   Other Topics Concern     Parent/sibling w/ CABG, MI or angioplasty before 65F 55M? No      Service Not Asked     Blood Transfusions Not Asked     Caffeine Concern Not Asked     Occupational Exposure Not Asked     Hobby Hazards Not Asked     Sleep Concern Yes     Stress Concern Yes     Weight Concern Not Asked     Special Diet Not Asked     Back Care Yes     Exercise Not Asked     Bike Helmet Not Asked     Seat Belt Yes     Self-Exams Not Asked   Social History Narrative     None       Family History:  Family History   Problem Relation Age of Onset     Musculoskeletal Disorder Mother         back     Anxiety Disorder Mother      Colon Polyps Mother      Ulcerative Colitis Mother         and ischemic small intestine, surgery     Hypertension Mother      Breast Cancer Mother      Osteoporosis Mother      Diabetes Mother         Type 2, Diagnosed in 2014     Depression Mother         Takes Cymbalta to help with chronic pain + depx     Thyroid Disease Mother         Hypothyroidism     Obesity Mother         Under much better control latter half of 2015     Musculoskeletal Disorder Father         back     Substance Abuse Father      Hypertension Father      Hyperlipidemia Father      Depression Father         Off meds for many years. Seems \"ok\"     Heart Disease Maternal Grandmother      Heart Disease Maternal Grandfather      Psychotic Disorder Paternal Grandfather      Suicide Paternal Grandfather      Depression Paternal Grandfather         Pediatrician. Committed suicide by pistol in 1990.     Musculoskeletal Disorder Brother         back     Depression Brother         Expressed as anger and moodiness     Substance Abuse Sister      Depression Sister         Mental Health Therapist, yet no anti-depressants?     Anxiety Disorder Sister         Mental Health Therapist, yet no anti-anxiety meds?     Other Cancer Other         Bladder Cancer - Fatal     Substance Abuse Brother      " Colon Cancer No family hx of      Crohn's Disease No family hx of      Anesthesia Reaction No family hx of      Cancer No family hx of         No family history of skin cancer           Neurologic  Visual loss: no, Double vision: no, Headache: no, Loss of consciousness:  no, Seizure: no, Fainting (syncope): no, Dysarthria: no, Dysphagia:  no, Vertigo:  no, Weakness: no, Atrophy: no, Twitches: no, Lhermitte's: no, Numbness or tingling: YES, Handwriting change: no, Tremors: YES, Involuntary movements: no, Imbalance: no, Abnormal gait:  no, Falls :no, Memory loss: no, RBD: no, Sleep disorder: no, Hallucination: no, Loss of concentration: no,  Behavioral change: no,  Loss of motivation: no      Comprehensive Neurologic Exam    Mental Status Exam   The patient is alert, oriented and exhibits no difficulty with cognition or memory.  A formal short test of mental status was performed.     Neurovascular         Speech and Language   Right Left     Carotid Bruit Absent Absent  Speech is normal.   Dorsalis Pedis Pulse Normal Normal  Description   Posterior Tibial Pulse Normal Normal  Language is normal.     Cranial Nerve Exam                 Right Left   Right Left   II                                        Normal Normal  Hearing (VIII) Normal Normal      III, IV, V!                   Normal Normal  Nystagmus (VIII) Normal Normal   EOM description                              Gag (IX, X) Normal Normal   Facial Sensation (V) Normal Normal  SCM (XI) Normal Normal   Muscles of Mastication (V) Normal Normal  Trapezius (XI) Normal Normal   Facial Strength (VII) Normal Normal  Tongue (XII) Normal Normal     Motor Exam  Strength (*/5 grading)  Muscle                  Right Left  Muscle Right Left   Frontalis                                           Normal Normal  Iliopsoas Normal    Normal          Orbicularis Oculi                     Normal Normal  Quadrideps Normal    Normal        Orbicularis Oris                          Normal Normal  Anterior Tibial Normal Normal      Deltoid Normal Normal  Gastrocnemius Normal    Normal   Biceps Normal Normal  Extensor Hallucis Longus Normal    Normal   Triceps Normal Normal  Toe Extensors Normal    Normal   EDC Normal Normal  Toe Flexor Normal    Normal   Finger Flexors Normal Normal  Other Normal Normal   First Dorsal Interosseous Normal Normal  Other Normal Normal   Hypothenar Normal Normal  Other Normal Normal   Thenar Normal Normal  Other Normal Normal   Mild high arches      Reflexes      Tone   Right Left   Right Left   Biceps -3 -3  Spasticity (S)  Rigidity (R)     Triceps -3 -3  Neck     Brachioradialis -4 -4  Arm     Quadriceps -3 -3  Leg     Ankle -4 -4       Babkinski Flexor Flexor         AMR       Coordination   Right Left   Right Left   Fingers Normal Normal  Finger nose finger Normal Normal   Hand Normal Normal  Drift Normal Normal   Leg Normal Normal  Heel Rosario Normal Normal   Foot Normal Normal  Other     Other               Involuntary Movements    Gait and Station  None            Right Left  Stand & Sit Normal   (Movement type) Normal Normal  Gait Normal   (Movement type) Normal Normal  Tandem Normal   (Movement type) Normal Normal  Romberg Absent       Sensory Exam          Right Left    Pin to midfoot -3 -3    Vibration  toes -3 -3    Joint position Normal Normal    Other             Coding statement:     Duration of  Services: face-to-face70min.   Greater than 50% of this visit was spent in counseling and coordination of care.    Medical decision making is high due to the following components:    Number of diagnoses:[id number]  Management:Diagnostic tests orders or reviewed.Yes  Medications were prescribed.No Medications were adjusted.No  Complexity of medical data:Outside records reviewed.Yes Radiology images reviewed by myself.Yes Review/order clinical lab tests. Yes Review/order radiologyNo Discussed tests with performing physician. No Called outside records.No  Discussed patient with another provider.No Took collateral history.NoRiskOne or more chronic illnesses with severe exacerbation, progression, or side effects of treatment.YesAcute or chronic illness or injury that poses a threat to life or bodily function.Yes  Abrupt change in neurologic status.No

## 2019-02-25 LAB
ALBUMIN SERPL ELPH-MCNC: 4.7 G/DL (ref 3.7–5.1)
ALPHA1 GLOB SERPL ELPH-MCNC: 0.3 G/DL (ref 0.2–0.4)
ALPHA2 GLOB SERPL ELPH-MCNC: 0.6 G/DL (ref 0.5–0.9)
B BURGDOR IGG+IGM SER QL: 0.02 (ref 0–0.89)
B-GLOBULIN SERPL ELPH-MCNC: 0.9 G/DL (ref 0.6–1)
GAMMA GLOB SERPL ELPH-MCNC: 0.9 G/DL (ref 0.7–1.6)
M PROTEIN SERPL ELPH-MCNC: 0 G/DL
PROT PATTERN SERPL ELPH-IMP: NORMAL

## 2019-02-25 RX ORDER — OXYCODONE HYDROCHLORIDE 5 MG/1
5-10 TABLET ORAL EVERY 6 HOURS PRN
Qty: 35 TABLET | Refills: 0 | Status: SHIPPED | OUTPATIENT
Start: 2019-02-25 | End: 2019-03-25

## 2019-02-25 NOTE — TELEPHONE ENCOUNTER
Requested Prescriptions   Pending Prescriptions Disp Refills     oxyCODONE (ROXICODONE) 5 MG tablet 35 tablet 0     Sig: Take 1-2 tablets (5-10 mg) by mouth every 6 hours as needed for pain (maximum 4 tablet(s) per day)    There is no refill protocol information for this order          oxyCODONE (ROXICODONE) 5 MG tablet      Last Written Prescription Date:  1/14/19  Last Fill Quantity: 35,   # refills: 0  Last Office Visit: 1/23/19  Future Office visit:    Next 5 appointments (look out 90 days)    Mar 29, 2019  1:00 PM CDT  Return Visit with Ray Malone MD  [facility] ([facility]) [address] [place] [id number] [phone]           Routing refill request to provider for review/approval because:  Drug not on the [facility], [facility] or [facility] refill protocol or controlled substance

## 2019-02-25 NOTE — TELEPHONE ENCOUNTER
Controlled Substance Refill Request for Oxycodone  Problem List Complete:  Yes  Problem Detail     Noted:  4/4/2017    Priority:  Medium    Overview Addendum 2/25/2019 12:18 PM by Martell Chaves RN   Patient is followed by Ksenia Lyles MD for ongoing prescription of pain medication.  All refills should only be approved by this provider, or covering partner.     Medication(s): oxy 5.   Maximum quantity per month: 40  Clinic visit frequency required: Q 6  months      Controlled substance agreement:  Encounter-Level CSA - 04/04/2017:            Controlled Substance Agreement - Scan on 4/11/2017  1:30 PM : CONTROLLED SUBSTANCE AGREEMENT (below)                   Pain Clinic evaluation in the past: Yes     DIRE Total Score(s):  No flowsheet data found.     Last [facility] website verification:  02/25/19    [email or web address]         checked in past 3 months?  Yes 2/25/19   Last Written Prescription Date:  1/14/19  Last Fill Quantity: 35 tablets  # refills: 0   Last office visit: No previous visit found with prescribing provider:  1/23/19   Future Office Visit:   Next 5 appointments (look out 90 days)    Mar 29, 2019  1:00 PM CDT  Return Visit with Ray Malone MD  [facility] ([facility]) [address] [place] [id number]  [phone]         RX [place] ([place]) reviewed:  reviewed- no concerns    [place] profile:  [email or web address]        Martell Chaves RN, BSN

## 2019-02-26 ENCOUNTER — MYC REFILL (OUTPATIENT)
Dept: FAMILY MEDICINE | Facility: CLINIC | Age: 44
End: 2019-02-26

## 2019-02-26 DIAGNOSIS — G62.9 NEUROPATHY: ICD-10-CM

## 2019-02-27 RX ORDER — PREGABALIN 150 MG/1
150 CAPSULE ORAL 2 TIMES DAILY
Qty: 60 CAPSULE | Refills: 0 | Status: SHIPPED | OUTPATIENT
Start: 2019-02-27 | End: 2019-03-17

## 2019-02-27 NOTE — TELEPHONE ENCOUNTER
Requested Prescriptions   Pending Prescriptions Disp Refills     pregabalin (LYRICA) 150 MG capsule 60 capsule 1     Sig: Take 1 capsule (150 mg) by mouth 2 times daily    There is no refill protocol information for this order        pregabalin (LYRICA) 150 MG capsule  Last Written Prescription Date:  1/3/19  Last Fill Quantity: 60,  # refills: 1   Last office visit: No previous visit found with prescribing provider:  Dr. Renteria   Future Office Visit:   Next 5 appointments (look out 90 days)    Mar 29, 2019  1:00 PM CDT  Return Visit with Ray Malone MD  [facility] ([facility]) [address] [place] [id number] [phone]

## 2019-02-27 NOTE — TELEPHONE ENCOUNTER
Routing refill request to provider for review/approval because:  Drug not on the [facility] refill protocol       Martell Chaves RN, BSN

## 2019-02-28 ENCOUNTER — TELEPHONE (OUTPATIENT)
Dept: FAMILY MEDICINE | Facility: CLINIC | Age: 44
End: 2019-02-28

## 2019-02-28 NOTE — TELEPHONE ENCOUNTER
Forms from [employer] Claims placed on Dr. Trupti harris for completion.    Farida James

## 2019-03-01 LAB — PNP ABY SERUM: NORMAL

## 2019-03-05 ENCOUNTER — OFFICE VISIT (OUTPATIENT)
Dept: SLEEP MEDICINE | Facility: CLINIC | Age: 44
End: 2019-03-05
Payer: MEDICARE

## 2019-03-05 VITALS
HEIGHT: 77 IN | SYSTOLIC BLOOD PRESSURE: 139 MMHG | HEART RATE: 90 BPM | OXYGEN SATURATION: 97 % | DIASTOLIC BLOOD PRESSURE: 92 MMHG | BODY MASS INDEX: 37.19 KG/M2 | WEIGHT: 315 LBS

## 2019-03-05 DIAGNOSIS — G47.33 OSA (OBSTRUCTIVE SLEEP APNEA): ICD-10-CM

## 2019-03-05 PROCEDURE — 99213 OFFICE O/P EST LOW 20 MIN: CPT | Performed by: INTERNAL MEDICINE

## 2019-03-05 ASSESSMENT — MIFFLIN-ST. JEOR: SCORE: 2526.47

## 2019-03-05 NOTE — PATIENT INSTRUCTIONS
Your BMI is Body mass index is 39.84 kg/m .  Weight management is a personal decision.  If you are interested in exploring weight loss strategies, the following discussion covers the approaches that may be successful. Body mass index (BMI) is one way to tell whether you are at a healthy weight, overweight, or obese. It measures your weight in relation to your height.  A BMI of 18.5 to 24.9 is in the healthy range. A person with a BMI of 25 to 29.9 is considered overweight, and someone with a BMI of 30 or greater is considered obese. More than two-thirds of American adults are considered overweight or obese.  Being overweight or obese increases the risk for further weight gain. Excess weight may lead to heart disease and diabetes.  Creating and following plans for healthy eating and physical activity may help you improve your health.  Weight control is part of healthy lifestyle and includes exercise, emotional health, and healthy eating habits. Careful eating habits lifelong are the mainstay of weight control. Though there are significant health benefits from weight loss, long-term weight loss with diet alone may be very difficult to achieve- studies show long-term success with dietary management in less than 10% of people. Attaining a healthy weight may be especially difficult to achieve in those with severe obesity. In some cases, medications, devices and surgical management might be considered.  What can you do?  If you are overweight or obese and are interested in methods for weight loss, you should discuss this with your provider.     Consider reducing daily calorie intake by 500 calories.     Keep a food journal.     Avoiding skipping meals, consider cutting portions instead.    Diet combined with exercise helps maintain muscle while optimizing fat loss. Strength training is particularly important for building and maintaining muscle mass. Exercise helps reduce stress, increase energy, and improves fitness.  Increasing exercise without diet control, however, may not burn enough calories to loose weight.       Start walking three days a week 10-20 minutes at a time    Work towards walking thirty minutes five days a week     Eventually, increase the speed of your walking for 1-2 minutes at time    In addition, we recommend that you review healthy lifestyles and methods for weight loss available through the National Institutes of Health patient information sites:  http://win.niddk.nih.gov/publications/index.htm    And look into health and wellness programs that may be available through your health insurance provider, employer, local community center, or scar club.    Weight management plan: Patient was referred to their PCP to discuss a diet and exercise plan.      Your Body mass index is 39.84 kg/m .  Weight management is a personal decision.  If you are interested in exploring weight loss strategies, the following discussion covers the approaches that may be successful. Body mass index (BMI) is one way to tell whether you are at a healthy weight, overweight, or obese. It measures your weight in relation to your height.  A BMI of 18.5 to 24.9 is in the healthy range. A person with a BMI of 25 to 29.9 is considered overweight, and someone with a BMI of 30 or greater is considered obese. More than two-thirds of American adults are considered overweight or obese.  Being overweight or obese increases the risk for further weight gain. Excess weight may lead to heart disease and diabetes.  Creating and following plans for healthy eating and physical activity may help you improve your health.  Weight control is part of healthy lifestyle and includes exercise, emotional health, and healthy eating habits. Careful eating habits lifelong are the mainstay of weight control. Though there are significant health benefits from weight loss, long-term weight loss with diet alone may be very difficult to achieve- studies show long-term  success with dietary management in less than 10% of people. Attaining a healthy weight may be especially difficult to achieve in those with severe obesity. In some cases, medications, devices and surgical management might be considered.  What can you do?  If you are overweight or obese and are interested in methods for weight loss, you should discuss this with your provider.     Consider reducing daily calorie intake by 500 calories.     Keep a food journal.     Avoiding skipping meals, consider cutting portions instead.    Diet combined with exercise helps maintain muscle while optimizing fat loss. Strength training is particularly important for building and maintaining muscle mass. Exercise helps reduce stress, increase energy, and improves fitness. Increasing exercise without diet control, however, may not burn enough calories to loose weight.       Start walking three days a week 10-20 minutes at a time    Work towards walking thirty minutes five days a week     Eventually, increase the speed of your walking for 1-2 minutes at time    In addition, we recommend that you review healthy lifestyles and methods for weight loss available through the National Institutes of Health patient information sites:  http://win.niddk.nih.gov/publications/index.htm    And look into health and wellness programs that may be available through your health insurance provider, employer, local community center, or scar club.    Weight management plan: Patient was referred to their PCP to discuss a diet and exercise plan.

## 2019-03-05 NOTE — TELEPHONE ENCOUNTER
Free Drug Application Approved  Effective Dates 3/5/2019 - 12/31/2019  Patient notified? Yes  Additional Information

## 2019-03-05 NOTE — PROGRESS NOTES
"Obstructive Sleep Apnea - PAP Follow-Up Visit:    Chief Complaint   Patient presents with     CPAP Follow Up       Joel Pineda comes in today for follow-up of their mild sleep apnea, managed with CPAP.     Joel Pineda comes in today for follow-up of their mild sleep apnea, he is interested in an oral appliance. He has had problems with psychophysiologic insomnia     Initial seen at [facility] for Loud snoring, gasping, choking and snorting, sleep maintenence difficulties, fatigue (ESS 5), narrowed oropharynx, large neck, obesity, HTN. He previously had no evidence of obstructive sleep apnea by sleep study 2011 but had a 50# weight gain since then. Fatigue (Multiple potential etiologies including sleep disordered breathing, PLM disorder (sleep study 2011), insomnia, medications (imipramine, oxycontin, oxycodone, atenolol. Lyrica, klonopin, zyrtec, zanaflex, lunesta)). Sleep onset, sleep maintenance difficulties, suspect mostly related to Psychophysiologic insomnia comorbid to depression and chronic pain. He does not appear to be having difficulties with delayed sleep phase syndrome at this time.     Sleep study done on 1/27/15 (308#). AHI was 11.5, with mild desaturations down to 88%. RDI 25.7. REM RDI n/a. Supine AHI 12.7, consistent with no excessive SUPINE LLOYD. PLMI 2.2/hour.     At visit 4/2015 he has stopped lunesta 'cold turkey' and was sleeping fine. His fatigue was unimproved with CPAP despite good compliance.      At visit 12/2016 it was noted he was seen at \"[facility]\" sleep center and had a sleep titration study due \"CPAP intolerance\". CPAP 8 cm was reported to be effective. He saw ENT ([facility]) and no new findings noted per patient. He reported for unknown reason 2-3 months ago he began to experience his nose has been congested within an hour of laying down with CPAP. Without CPAP he does not get congested. It seemed to start after starting Vraylar (cariprazine). " He was poorly compliant with CPAP.    In 2/2017 he was seen by pulmonology at [facility] and was referred for a mandibular advancement device.    Overall, he rates the experience with PAP as 7 (0 poor, 10 great). The mask is comfortable.    The mask is not leaking .  He is not snoring with the mask on. He is not having gasp arousals.  He is not having significant oral/nasal dryness. The pressure is comfortable.     His PAP interface is Nasal Pillows.    Bedtime is typically 2300. Usually it takes about 30 min minutes to fall asleep with the mask on. Wake time is typically 0830.  Patient is using PAP therapy 9 hours per night. The patient is usually getting 9 hours of sleep per night.    He does feel rested in the morning.    Total score - [place]: 8 (3/5/2019 10:00 AM)    LUIS Total Score: 7    Respironics  Auto-PAP 9 - 13 cmH2O 30 day usage data:    100% of days with > 4 hours of use. 0/30 days with no use.   Average use 546 minutes per day.   Average leak 33.08 LPM.  Average % of night in large leak 0%.    CPAP 90% pressure 13cm.   AHI 0.59 events per hour.    Past medical/surgical history, family history, social history, medications and allergies were reviewed.      Problem List:  Patient Active Problem List    Diagnosis Date Noted     History of pulmonary embolism 01/28/2019     Priority: Medium     Peripheral polyneuropathy 01/23/2019     Priority: Medium     Type 2 diabetes mellitus with complication, without long-term current use of insulin (H) 12/24/2018     Priority: Medium     DDD (degenerative disc disease), lumbar 11/13/2018     Priority: Medium     Morbid obesity due to hypertriglyceridemia (H) 05/17/2018     Priority: Medium     Fatty infiltration of liver 05/17/2018     Priority: Medium     Ankylosing spondylitis of sacral region (H) 02/28/2018     Priority: Medium     Chronic pain syndrome 04/04/2017     Priority: Medium     Patient is followed by Ksenia Lyles MD for ongoing prescription  of pain medication.  All refills should only be approved by this provider, or covering partner.    Medication(s): oxy 5.   Maximum quantity per month: 40  Clinic visit frequency required: Q 6  months     Controlled substance agreement:  Encounter-Level CSA - 04/04/2017:          Controlled Substance Agreement - Scan on 4/11/2017  1:30 PM : CONTROLLED SUBSTANCE AGREEMENT (below)              Pain Clinic evaluation in the past: Yes    DIRE Total Score(s):  No flowsheet data found.    Last [facility] website verification:  02/25/19    [email or web address]        Essential hypertension with goal blood pressure less than 140/90 11/01/2016     Priority: Medium     B12 deficiency 08/29/2016     Priority: Medium     Irritable bowel syndrome with diarrhea 08/19/2016     Priority: Medium     Chronic midline low back pain without sciatica 06/06/2016     Priority: Medium     Bipolar 2 disorder (H) 12/28/2015     Priority: Medium     Acquired hypothyroidism 10/08/2015     Priority: Medium     Facet arthritis of cervical region (H) 10/06/2015     Priority: Medium     Intracranial arachnoid cyst 10/02/2015     Priority: Medium     LLOYD (obstructive sleep apnea)- mild (AHI 11) 01/31/2015     Priority: Medium     Study Date: 1/27/2015- (308.1 lbs)  Snoring was reported assoft to moderate.  Lowest oxygen saturation was 88.0%. Apnea/Hypopnea Index was 11.5 events per hour. REM was not seen.  The supine AHI is 12.7.  RDI was  25.7. PLM index was 0 per hour.  Sleep study 10/31/16 [facility] (292#) CPAP 8 cm effective       Anxiety 01/12/2015     Priority: Medium     GERD (gastroesophageal reflux disease)      Priority: Medium     Chronic nonallergic rhinitis      Priority: Medium     RAST all NEGATIVE for environmental allergens, IgE= 6 (normal)       Hyperlipidemia LDL goal <100      Priority: Medium     Major depressive disorder, recurrent episode (H)      Priority: Medium     Multiple psych providers - they manage meds  August 2015:  "Provigil induced severe mood dis-function       Intermittent asthma      Priority: Medium     Exercise-induced       Diverticulosis 09/01/2006     Priority: Medium     Recurrent diverticulitis, S/p sigmoid resction 8/2013        BP (!) 139/92   Pulse 90   Ht 1.956 m (6' 5\")   Wt (!) 152.4 kg (336 lb)   SpO2 97%   BMI 39.84 kg/m      Tito to follow up with Primary Care provider regarding elevated blood pressure.     Impression/Plan:  1. LLOYD (obstructive sleep apnea)  Mild Sleep apnea. Tolerating PAP well. Daytime symptoms are improved.     Joel Pineda will follow up in about 1 year(s).     CC:  Ksenia Lyles,   "

## 2019-03-05 NOTE — NURSING NOTE
"Chief Complaint   Patient presents with     CPAP Follow Up       Initial BP (!) 139/92   Pulse 90   Ht 1.956 m (6' 5\")   Wt (!) 152.4 kg (336 lb)   SpO2 97%   BMI 39.84 kg/m   Estimated body mass index is 39.84 kg/m  as calculated from the following:    Height as of this encounter: 1.956 m (6' 5\").    Weight as of this encounter: 152.4 kg (336 lb).    Medication Reconciliation: complete      "

## 2019-03-07 ENCOUNTER — TELEPHONE (OUTPATIENT)
Dept: NURSING | Facility: CLINIC | Age: 44
End: 2019-03-07

## 2019-03-07 ENCOUNTER — ALLIED HEALTH/NURSE VISIT (OUTPATIENT)
Dept: EDUCATION SERVICES | Facility: CLINIC | Age: 44
End: 2019-03-07
Payer: MEDICARE

## 2019-03-07 DIAGNOSIS — E11.8 TYPE 2 DIABETES MELLITUS WITH COMPLICATION, WITHOUT LONG-TERM CURRENT USE OF INSULIN (H): Primary | ICD-10-CM

## 2019-03-07 PROCEDURE — G0109 DIAB MANAGE TRN IND/GROUP: HCPCS

## 2019-03-07 NOTE — GROUP NOTE
Diabetes Self-Management Training Class 3    Joel Pineda presents today for group education related to Type 2 diabetes   He is accompanied by self    March 7  [facility] Diabetes Education [facility]  Start and End Time  Start Time: 1000  End Time: 1130    Educational Topics Discussed  Diabetes Standards of Care (D5), Prevention of Diabetes Complications, Staying Safe with Diabetes (Medical ID, Safe Travel, Sick Days), Eating Out, Weight Loss Guidelines, Diabetes and Depression, Support from Family and Friends, Resources for Support and Further Education    Materials Provided  [facility] Understanding Diabetes Booklet    All questions were answered in the group setting. Patient to contact educator with specific questions, should need arise.    Plan and Follow-up  Patient to follow-up with educator regarding blood glucose values as determined at Diabetes Self-Management Training Initial Assessment Visit.   Patient to follow-up with PCP and have A1C lab redrawn as directed.               Ericka Mora RD

## 2019-03-08 NOTE — TELEPHONE ENCOUNTER
Patient is calling regarding sx of nausea and muscle twitches he thinks might be related to the increase dosing of a medication his psychiatrist gave him. He states he's been on this medication for over 2 years, but the dose was increased yesterday. MD is a non [facility] doctor. Patient goes to inpatient at the facility in [place]. Advised call the on call MD for that group. Otherwise gave option of ER if needed. He already contacted a pharmacist and he was advised to go back to the original dose and call MD in am 3/8. Patient agrees with plan.

## 2019-03-10 ENCOUNTER — MYC REFILL (OUTPATIENT)
Dept: FAMILY MEDICINE | Facility: CLINIC | Age: 44
End: 2019-03-10

## 2019-03-10 DIAGNOSIS — E78.5 HYPERLIPIDEMIA LDL GOAL <100: ICD-10-CM

## 2019-03-10 DIAGNOSIS — I10 HYPERTENSION GOAL BP (BLOOD PRESSURE) < 140/90: Chronic | ICD-10-CM

## 2019-03-10 DIAGNOSIS — R11.0 NAUSEA: Primary | ICD-10-CM

## 2019-03-11 NOTE — TELEPHONE ENCOUNTER
"Requested Prescriptions   Pending Prescriptions Disp Refills     rosuvastatin (CRESTOR) 40 MG tablet    Last Written Prescription Date:  12/27/18  Last Fill Quantity: 90,  # refills: 3   Last Office Visit with G, P or [facility] prescribing provider:  01/23/19-Trupti   Future Office Visit:    Next 5 appointments (look out 90 days)    Mar 29, 2019  1:00 PM CDT  [facility] with [facility]  [facility] [id number] [place] [id number]  [phone]   May 29, 2019  1:20 PM CDT  Return Visit with Oneil Baxter MD  [facility] ([address] [address]  [phone]   Jun 04, 2019  1:00 PM CDT  Return Visit with Elaine Segovia MD  [facility]) [address] [place] [address]  [phone]        90 tablet 3     Sig: Take 1 tablet (40 mg) by mouth daily    Statins Protocol Passed - 3/11/2019  7:06 AM       Passed - LDL on file in past 12 months    Recent Labs   Lab Test 12/24/18  1114   LDL Cannot estimate LDL when triglyceride exceeds 400 mg/dL            Passed - No abnormal creatine kinase in past 12 months    Recent Labs   Lab Test 01/03/19  0844                  Passed - Recent (12 mo) or future (30 days) visit within the authorizing provider's specialty    Patient had office visit in the last 12 months or has a visit in the next 30 days with authorizing provider or within the authorizing provider's specialty.  See \"Patient Info\" tab in inbasket, or \"Choose Columns\" in Meds & Orders section of the refill encounter.             Passed - Medication is active on med list       Passed - Patient is age 18 or older          "

## 2019-03-12 ENCOUNTER — TELEPHONE (OUTPATIENT)
Dept: PALLIATIVE MEDICINE | Facility: CLINIC | Age: 44
End: 2019-03-12

## 2019-03-12 ENCOUNTER — TELEPHONE (OUTPATIENT)
Dept: FAMILY MEDICINE | Facility: CLINIC | Age: 44
End: 2019-03-12

## 2019-03-12 DIAGNOSIS — G62.9 NEUROPATHY: ICD-10-CM

## 2019-03-12 NOTE — TELEPHONE ENCOUNTER
Will ask nursing to please call patient.    I haven't really seen him in clinic since 2016- we have just done procedures on lumbar spine and SI joint. He can request no fellows to be involved at any point.  It is possible that Dr. Montgomery would have a fellow with him as well on some days, as this is a teaching institution.    I'm not sure what he is requesting here- because nothing has ever been done in his neck.    I don't have concerns with him changing to another provider- but we probably should get a referral from Dr. Lyles as we haven't seen him in clinic in some time (we can ask for that rather then patient reaching out to PCP)--- and if the issue is the fellow- that is different.    Ericka Diaz MD  [facility]

## 2019-03-12 NOTE — TELEPHONE ENCOUNTER
"Requested Prescriptions   Pending Prescriptions Disp Refills     ramipril (ALTACE) 10 MG capsule 90 capsule 1     Sig: Take 1 capsule (10 mg) by mouth daily    ACE Inhibitors (Including Combos) Protocol Failed - 3/10/2019  9:00 PM       Failed - Blood pressure under 140/90 in past 12 months    BP Readings from Last 3 Encounters:   03/05/19 (!) 139/92   02/22/19 123/81   02/18/19 (!) 143/91                Passed - Recent (12 mo) or future (30 days) visit within the authorizing provider's specialty    Patient had office visit in the last 12 months or has a visit in the next 30 days with authorizing provider or within the authorizing provider's specialty.  See \"Patient Info\" tab in inbasket, or \"Choose Columns\" in Meds & Orders section of the refill encounter.             Passed - Medication is active on med list       Passed - Patient is age 18 or older       Passed - Normal serum creatinine on file in past 12 months    Recent Labs   Lab Test 01/03/19  0844   CR 0.90            Passed - Normal serum potassium on file in past 12 months    Recent Labs   Lab Test 01/03/19  0844   POTASSIUM 4.1               "

## 2019-03-12 NOTE — TELEPHONE ENCOUNTER
What type of form? [employer] Forms  What day did you drop off your forms? Tuesday, March 12  Is there a due date? asap (7-10 business days to compete forms)   How would you like to receive these forms?  Fax: to:  [phone]    What is the best number to contact you?  [phone]  What time works best to contact you with in 4 hrs? ANYTIME  Is it okay to leave a message? Yes    Nadia Blair (Auto signs name of person logged into Epic)

## 2019-03-12 NOTE — TELEPHONE ENCOUNTER
"Requested Prescriptions   Pending Prescriptions Disp Refills     ondansetron (ZOFRAN) 8 MG tablet [Pharmacy Med Name: Ondansetron HCl Oral Tablet 8 MG] 20 tablet 4                Last Written Prescription Date:  n/a  Last Fill Quantity: 0,   # refills: 0  Last Office Visit: 1/23/19  Future Office visit:    Next 5 appointments (look out 90 days)    Mar 29, 2019  1:00 PM CDT  Return Visit with Ray Malone MD  [facility]) [address] [place] [address]  [phone]   May 29, 2019  1:20 PM CDT  Return Visit with Oneil Baxter MD  [facility] ([address] [address]  [phone]   Jun 04, 2019  1:00 PM CDT  Return Visit with Elaine Segovia MD  [facility]) [address] [place] [address]  [phone]           Routing refill request to provider for review/approval because:  Medication is reported/historical   Sig: TAKE 1 TABLET BY MOUTH EVERY 8 HOURS AS NEEDED FOR NAUSEA OR VOMITING     Antivertigo/Antiemetic Agents Passed - 3/12/2019  5:38 PM       Passed - Recent (12 mo) or future (30 days) visit within the authorizing provider's specialty    Patient had office visit in the last 12 months or has a visit in the next 30 days with authorizing provider or within the authorizing provider's specialty.  See \"Patient Info\" tab in inbasket, or \"Choose Columns\" in Meds & Orders section of the refill encounter.             Passed - Medication is active on med list       Passed - Patient is 18 years of age or older              Cas Faarax  [facility]  "

## 2019-03-12 NOTE — TELEPHONE ENCOUNTER
"Requested Prescriptions   Pending Prescriptions Disp Refills     ramipril (ALTACE) 10 MG capsule 90 capsule 1          Last Written Prescription Date:  8/28/18  Last Fill Quantity: 90,  # refills: 1   Last Office Visit with [facility] prescribing provider:  1/23/19   Future Office Visit:    Next 5 appointments (look out 90 days)    Mar 29, 2019  1:00 PM CDT  Return Visit with Ray Malone MD  [facility]) [address] [place] [address]  [phone]   May 29, 2019  1:20 PM CDT  Return Visit with Oneil Baxter MD  [facility] [address] [place] [address]  [phone]   Jun 04, 2019  1:00 PM CDT  Return Visit with Elaine Segovia MD  [facility]) [address] [place] [address]  [phone]          Sig: Take 1 capsule (10 mg) by mouth daily    ACE Inhibitors (Including Combos) Protocol Failed - 3/12/2019  1:13 PM       Failed - Blood pressure under 140/90 in past 12 months    BP Readings from Last 3 Encounters:   03/05/19 (!) 139/92   02/22/19 123/81   02/18/19 (!) 143/91                Passed - Recent (12 mo) or future (30 days) visit within the authorizing provider's specialty    Patient had office visit in the last 12 months or has a visit in the next 30 days with authorizing provider or within the authorizing provider's specialty.  See \"Patient Info\" tab in inbasket, or \"Choose Columns\" in Meds & Orders section of the refill encounter.             Passed - Medication is active on med list       Passed - Patient is age 18 or older       Passed - Normal serum creatinine on file in past 12 months    Recent Labs   Lab Test 01/03/19  0844   CR 0.90            Passed - Normal serum potassium on file in past 12 months    Recent Labs   Lab Test 01/03/19  0844   POTASSIUM 4.1                   Cas Faarax  [facility]  "

## 2019-03-12 NOTE — TELEPHONE ENCOUNTER
Pt called in to switch from Ericka Diaz to Dr Montgomery. Pt was informed that this would be sent over for review. Pt stated he doesn't need an assistant or someone in training poking around at his neck he needs to establish care with a provider.      Ngueyn GUERRIER    [facility] [place]

## 2019-03-13 RX ORDER — ONDANSETRON 8 MG/1
TABLET, FILM COATED ORAL
Qty: 20 TABLET | Refills: 4 | Status: SHIPPED | OUTPATIENT
Start: 2019-03-13 | End: 2020-01-28

## 2019-03-13 RX ORDER — ROSUVASTATIN CALCIUM 40 MG/1
40 TABLET, COATED ORAL DAILY
Qty: 90 TABLET | Refills: 2 | Status: SHIPPED | OUTPATIENT
Start: 2019-03-13 | End: 2019-11-10

## 2019-03-13 RX ORDER — RAMIPRIL 10 MG/1
10 CAPSULE ORAL DAILY
Qty: 90 CAPSULE | Refills: 1 | Status: SHIPPED | OUTPATIENT
Start: 2019-03-13 | End: 2019-09-22

## 2019-03-13 NOTE — TELEPHONE ENCOUNTER
Routing refill request to provider for review/approval because:  Medication is reported/historical        Martell Chaves RN, BSN

## 2019-03-13 NOTE — TELEPHONE ENCOUNTER
Prescription approved per [facility] Refill Protocol.      Martell Chaves RN, BSN

## 2019-03-13 NOTE — TELEPHONE ENCOUNTER
Prescription approved per [facility] Refill Protocol.      Martell Chaves RN, BSN

## 2019-03-14 ENCOUNTER — MYC MEDICAL ADVICE (OUTPATIENT)
Dept: PODIATRY | Facility: CLINIC | Age: 44
End: 2019-03-14

## 2019-03-14 NOTE — TELEPHONE ENCOUNTER
Below information was relayed to the patient. He is going to request a referral from Dr. Lyles. He would prefer to continue seeing Dr. Diaz, but with out fellows. He reports the fellows are the only reason he asked for a change.    JAVON GarciaN, RN  Care Coordinator  [facility] [place]

## 2019-03-15 ENCOUNTER — TELEPHONE (OUTPATIENT)
Dept: PODIATRY | Facility: CLINIC | Age: 44
End: 2019-03-15

## 2019-03-17 RX ORDER — PREGABALIN 150 MG/1
150 CAPSULE ORAL 2 TIMES DAILY
Qty: 60 CAPSULE | Refills: 11 | Status: SHIPPED | OUTPATIENT
Start: 2019-03-17 | End: 2019-09-05

## 2019-03-18 NOTE — TELEPHONE ENCOUNTER
Form completed / signed.  In fax basket.   Please include med list and the new lyrica prescription

## 2019-03-19 DIAGNOSIS — E55.9 VITAMIN D DEFICIENCY: ICD-10-CM

## 2019-03-19 DIAGNOSIS — E11.8 TYPE 2 DIABETES MELLITUS WITH COMPLICATION, WITHOUT LONG-TERM CURRENT USE OF INSULIN (H): ICD-10-CM

## 2019-03-19 LAB
ALBUMIN SERPL-MCNC: 4.3 G/DL (ref 3.4–5)
ALP SERPL-CCNC: 97 U/L (ref 40–150)
ALT SERPL W P-5'-P-CCNC: 51 U/L (ref 0–70)
ANION GAP SERPL CALCULATED.3IONS-SCNC: 7 MMOL/L (ref 3–14)
AST SERPL W P-5'-P-CCNC: 38 U/L (ref 0–45)
BILIRUB SERPL-MCNC: 0.4 MG/DL (ref 0.2–1.3)
BUN SERPL-MCNC: 12 MG/DL (ref 7–30)
CALCIUM SERPL-MCNC: 9.5 MG/DL (ref 8.5–10.1)
CHLORIDE SERPL-SCNC: 106 MMOL/L (ref 94–109)
CHOLEST SERPL-MCNC: 129 MG/DL
CO2 SERPL-SCNC: 27 MMOL/L (ref 20–32)
CREAT SERPL-MCNC: 1 MG/DL (ref 0.66–1.25)
GFR SERPL CREATININE-BSD FRML MDRD: >90 ML/MIN/{1.73_M2}
GLUCOSE SERPL-MCNC: 110 MG/DL (ref 70–99)
HBA1C MFR BLD: 6.1 % (ref 0–5.6)
HDLC SERPL-MCNC: 28 MG/DL
LDLC SERPL CALC-MCNC: ABNORMAL MG/DL
LDLC SERPL DIRECT ASSAY-MCNC: 53 MG/DL
NONHDLC SERPL-MCNC: 101 MG/DL
POTASSIUM SERPL-SCNC: 4.3 MMOL/L (ref 3.4–5.3)
PROT SERPL-MCNC: 7.4 G/DL (ref 6.8–8.8)
SODIUM SERPL-SCNC: 140 MMOL/L (ref 133–144)
TRIGL SERPL-MCNC: 583 MG/DL

## 2019-03-19 PROCEDURE — 83036 HEMOGLOBIN GLYCOSYLATED A1C: CPT | Performed by: FAMILY MEDICINE

## 2019-03-19 PROCEDURE — 83721 ASSAY OF BLOOD LIPOPROTEIN: CPT | Mod: 59 | Performed by: FAMILY MEDICINE

## 2019-03-19 PROCEDURE — 80053 COMPREHEN METABOLIC PANEL: CPT | Performed by: FAMILY MEDICINE

## 2019-03-19 PROCEDURE — 80061 LIPID PANEL: CPT | Performed by: FAMILY MEDICINE

## 2019-03-19 PROCEDURE — 36415 COLL VENOUS BLD VENIPUNCTURE: CPT | Performed by: FAMILY MEDICINE

## 2019-03-21 ENCOUNTER — TELEPHONE (OUTPATIENT)
Dept: FAMILY MEDICINE | Facility: CLINIC | Age: 44
End: 2019-03-21

## 2019-03-21 NOTE — TELEPHONE ENCOUNTER
"PA for omega-3 acid ethyl esters (LOVAZA) 1 g capsule    Go to key.covermymeds.com and click \"Enter a Key\"    Key:  [id number]    Enter patients last name and     Complete the request and click Send to Plan.    PA or alternative    Farida James    "

## 2019-03-22 ENCOUNTER — OFFICE VISIT (OUTPATIENT)
Dept: NEUROLOGY | Facility: CLINIC | Age: 44
End: 2019-03-22
Payer: MEDICARE

## 2019-03-22 DIAGNOSIS — G60.3 IDIOPATHIC PROGRESSIVE POLYNEUROPATHY: Primary | ICD-10-CM

## 2019-03-22 PROCEDURE — 95885 MUSC TST DONE W/NERV TST LIM: CPT | Performed by: PSYCHIATRY & NEUROLOGY

## 2019-03-22 PROCEDURE — 95912 NRV CNDJ TEST 11-12 STUDIES: CPT | Performed by: PSYCHIATRY & NEUROLOGY

## 2019-03-22 NOTE — LETTER
3/22/2019         RE: Joel Pineda  [id number]  [place] [address]        Dear Colleague,    Thank you for referring your patient, Joel Pineda, to the [facility]. Please see a copy of my visit note below.    [facility]  Electrodiagnostic Laboratory    Nerve Conduction & EMG Report          Patient: Joel Pineda YOB: 1975  Patient ID: [id number] Age: 43 Years 8 Months  Gender: Male      History & Examination:  Joel Pineda is a 43 year old man with ankylosing spondylitis, diabetes, a history of vitamin B12 deficiency, and recent development of sensory symptoms in the feet while on a TNF-apha inhibitor. Examination is notable for high plantar arches, distal sensory loss, and reduced reflexes. He is referred for characterization of a presumed neuropathy.    Techniques:  Motor conduction studies were done with surface recording electrodes. Sensory conduction studies were performed with surface electrodes, unless indicated otherwise by (n), designating the use of subdermal recording electrodes. Temperature was monitored and recorded throughout the study. Upper extremities were maintained at a temperature of 32 degrees Centigrade or higher. Lower extremities were maintained at a temperature of 31 degrees Centigrade or higher. EMG was done with a concentric needle electrode.    Results:  Left sural and superficial peroneal sensory nerve action potentials were absent. The right sural and superficial peroneal sensory nerve action potential amplitudes were attenuated for a patient of this age. Left radial and ulnar sensory conduction studies were normal. Left and right peroneal compound muscle action potential amplitudes were markedly and moderately attenuated, respectively, with mild right peroneal distal motor latency prolongation and conduction slowing in the calf. Bilateral tibial, left ulnar, and left median motor conduction studies were normal.  Electromyography of selected muscles was normal.     Interpretation:  This is an abnormal study, demonstrating electrophysiologic evidence of the followin. 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  2. No evidence of a demyelinating polyneuropathy as can occur with TNF-alpha inhibitors.  3. Cannot exclude a superimposed axonal injury in the left sciatic distribution. See comment.    Comment:  The nerve conduction abnormalities are consistently more severe in the left lower limb. The patient does report several prior left foot injuries, which may be a contributing factor but is unlikely to fully explain the asymmetry. The absence of findings on needle electromyography and the normal upper limb studies reduce the likelihood of an active mononeuropathy multiplex.       Evan Santamaria M.D.      Sensory NCS      Nerve / Sites Rec. Site Onset Peak NP Amp Ref. PP Amp Dist Patrick Ref. Temp     ms ms  V  V  V cm m/s m/s  C   L ULNAR - Dig V      Dig V Wrist 2.71 3.28 8.0 8.0 7.2 14 51.7 48.0 33.9   L RADIAL - Snuff      Forearm Snuff 1.93 2.45 20.8 15.0 28.1 12.5 64.9 48.0 32.6   L SURAL - Lat Mall      Calf Ankle NR NR NR 8.0 NR 14 NR 38.0 31   R SURAL - Lat Mall      Calf Ankle 3.39 4.22 4.5 8.0 4.4 14 41.4 38.0 31.1   L SUP PERONEAL      Lat Leg Rosario NR NR NR  NR 12.5 NR 38.0 31.1   R SUP PERONEAL      Lat Leg Rosario 2.86 3.65 3.5  0.64 12.5 43.6 38.0 31       Motor NCS      Nerve / Sites Rec. Site Lat Ref. Amp Ref. Rel Amp Dist Patrick Ref. Dur. Area Temp.     ms ms mV mV % cm m/s m/s ms %  C   L MEDIAN - APB      Wrist APB 3.91 4.40 13.2 5.0 100 8   5.83 100 33      Elbow APB 8.28  10.8  81.6 24.5 56.0 48.0 6.56 90.2 33   L ULNAR - ADM      Wrist ADM 2.45 3.50 8.6 5.0 100 8   6.77 100 33.8      B.Elbow ADM 7.14  8.9  104 23.5 50.1 48.0 7.14 101 33.8      A.Elbow ADM 8.39  8.6  101 8 64.0 48.0 7.29 94.8 33.8   L DEEP PERONEAL - EDB      Ankle EDB 5.89 6.00 0.1 2.5 100 8   8.13  31.2   R DEEP PERONEAL -  EDB      Ankle EDB 8.02 6.00 1.1 2.5 100 8   5.63 100 31.6      FibHead EDB 17.86  1.2  108 35 35.6 38.0 8.75 81.6 31      Pop Fos EDB 20.52  1.3  113 11 41.4 38.0 8.39 90.9 31.6   L TIBIAL - AH      Ankle AH 2.97 6.00 12.5 4.0 100 8   7.66 100 32.2      Pop Fos AH 15.26  7.1  57 48 39.1 38.0 9.69 71.1 31   R TIBIAL - AH      Ankle AH 3.02 6.00 9.9 4.0 100 8   8.49 100 31.3       EMG Summary Table     Spontaneous MUAP Recruitment    IA Fib/PSW Fasc H.F. Amp Dur. PPP Pattern   L. TIB ANTERIOR N None None None N N None Normal   R. TIB ANTERIOR N None None None N N None Normal   L. GASTROCN (MED) N None None None N N None Normal   L. VAST LATERALIS N None None None N N None Normal   L. FIRST D INTEROSS N None None None N N None Normal                                    Again, thank you for allowing me to participate in the care of your patient.        Sincerely,        Evan Santamaria MD

## 2019-03-23 ENCOUNTER — NURSE TRIAGE (OUTPATIENT)
Dept: NURSING | Facility: CLINIC | Age: 44
End: 2019-03-23

## 2019-03-23 NOTE — TELEPHONE ENCOUNTER
Pt has peripheral neuropathy and yesterday he had an EMG w/ Dr Walk at [facility]. Today pt c/o increase in pain in L calf. No swelling or redness. No SOB. No chest pain. Able to walk but w/ difficulty due to pain. He says his L toes are cooler than on R but does not think this is new. He is sure the pain is worse in the L leg though not just usual pain he has w/ PN so we have to advise that pt be evaluated w/i next 4 hrs. Disc'd this w/ pt and he agreed.  Amber Siddiqui RN/FNA      Reason for Disposition    [1] Thigh or calf pain AND [2] only 1 side AND [3] present > 1 hour    Additional Information    Negative: Looks like a broken bone or dislocated joint (e.g., crooked or deformed)    Negative: Sounds like a life-threatening emergency to the triager    Negative: Followed a leg injury    Negative: Leg swelling is main symptom    Negative: Back pain radiating (shooting) into leg(s)    Negative: Knee pain is main symptom    Negative: Ankle pain is main symptom    Negative: Pregnant    Negative: Chest pain    Negative: Difficulty breathing    Negative: Unable to walk    Negative: [1] Red area or streak AND [2] fever    Negative: [1] Swollen joint AND [2] fever    Negative: [1] Cast on leg or ankle AND [2] now increased pain    Negative: Patient sounds very sick or weak to the triager    Negative: [1] SEVERE pain (e.g., excruciating, unable to do any normal activities) AND [2] not improved after 2 hours of pain medicine    Commented on: Entire foot is cool or blue in comparison to other side     See note    Protocols used: LEG PAIN-ADULT-

## 2019-03-23 NOTE — PROGRESS NOTES
[facility]  Electrodiagnostic Laboratory    Nerve Conduction & EMG Report          Patient: Joel Pineda YOB: 1975  Patient ID: [id number] Age: 43 Years 8 Months  Gender: Male      History & Examination:  Joel Pineda is a 43 year old man with ankylosing spondylitis, diabetes, a history of vitamin B12 deficiency, and recent development of sensory symptoms in the feet while on a TNF-apha inhibitor. Examination is notable for high plantar arches, distal sensory loss, and reduced reflexes. He is referred for characterization of a presumed neuropathy.    Techniques:  Motor conduction studies were done with surface recording electrodes. Sensory conduction studies were performed with surface electrodes, unless indicated otherwise by (n), designating the use of subdermal recording electrodes. Temperature was monitored and recorded throughout the study. Upper extremities were maintained at a temperature of 32 degrees Centigrade or higher. Lower extremities were maintained at a temperature of 31 degrees Centigrade or higher. EMG was done with a concentric needle electrode.    Results:  Left sural and superficial peroneal sensory nerve action potentials were absent. The right sural and superficial peroneal sensory nerve action potential amplitudes were attenuated for a patient of this age. Left radial and ulnar sensory conduction studies were normal. Left and right peroneal compound muscle action potential amplitudes were markedly and moderately attenuated, respectively, with mild right peroneal distal motor latency prolongation and conduction slowing in the calf. Bilateral tibial, left ulnar, and left median motor conduction studies were normal. Electromyography of selected muscles was normal.     Interpretation:  This is an abnormal study, demonstrating electrophysiologic evidence of the followin. 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  2. No evidence of a  demyelinating polyneuropathy as can occur with TNF-alpha inhibitors.  3. Cannot exclude a superimposed axonal injury in the left sciatic distribution. See comment.    Comment:  The nerve conduction abnormalities are consistently more severe in the left lower limb. The patient does report several prior left foot injuries, which may be a contributing factor but is unlikely to fully explain the asymmetry. The absence of findings on needle electromyography and the normal upper limb studies reduce the likelihood of an active mononeuropathy multiplex.       Evan Santamaria M.D.      Sensory NCS      Nerve / Sites Rec. Site Onset Peak NP Amp Ref. PP Amp Dist Patrick Ref. Temp     ms ms  V  V  V cm m/s m/s  C   L ULNAR - Dig V      Dig V Wrist 2.71 3.28 8.0 8.0 7.2 14 51.7 48.0 33.9   L RADIAL - Snuff      Forearm Snuff 1.93 2.45 20.8 15.0 28.1 12.5 64.9 48.0 32.6   L SURAL - Lat Mall      Calf Ankle NR NR NR 8.0 NR 14 NR 38.0 31   R SURAL - Lat Mall      Calf Ankle 3.39 4.22 4.5 8.0 4.4 14 41.4 38.0 31.1   L SUP PERONEAL      Lat Leg Rosario NR NR NR  NR 12.5 NR 38.0 31.1   R SUP PERONEAL      Lat Leg Rosario 2.86 3.65 3.5  0.64 12.5 43.6 38.0 31       Motor NCS      Nerve / Sites Rec. Site Lat Ref. Amp Ref. Rel Amp Dist Patrick Ref. Dur. Area Temp.     ms ms mV mV % cm m/s m/s ms %  C   L MEDIAN - APB      Wrist APB 3.91 4.40 13.2 5.0 100 8   5.83 100 33      Elbow APB 8.28  10.8  81.6 24.5 56.0 48.0 6.56 90.2 33   L ULNAR - ADM      Wrist ADM 2.45 3.50 8.6 5.0 100 8   6.77 100 33.8      B.Elbow ADM 7.14  8.9  104 23.5 50.1 48.0 7.14 101 33.8      A.Elbow ADM 8.39  8.6  101 8 64.0 48.0 7.29 94.8 33.8   L DEEP PERONEAL - EDB      Ankle EDB 5.89 6.00 0.1 2.5 100 8   8.13  31.2   R DEEP PERONEAL - EDB      Ankle EDB 8.02 6.00 1.1 2.5 100 8   5.63 100 31.6      FibHead EDB 17.86  1.2  108 35 35.6 38.0 8.75 81.6 31      Pop Fos EDB 20.52  1.3  113 11 41.4 38.0 8.39 90.9 31.6   L TIBIAL - AH      Ankle AH 2.97 6.00 12.5 4.0 100 8   7.66 100 32.2       Pop Fos AH 15.26  7.1  57 48 39.1 38.0 9.69 71.1 31   R TIBIAL - AH      Ankle AH 3.02 6.00 9.9 4.0 100 8   8.49 100 31.3       EMG Summary Table     Spontaneous MUAP Recruitment    IA Fib/PSW Fasc H.F. Amp Dur. PPP Pattern   L. TIB ANTERIOR N None None None N N None Normal   R. TIB ANTERIOR N None None None N N None Normal   L. GASTROCN (MED) N None None None N N None Normal   L. VAST LATERALIS N None None None N N None Normal   L. FIRST D INTEROSS N None None None N N None Normal

## 2019-03-24 ENCOUNTER — HOSPITAL ENCOUNTER (EMERGENCY)
Facility: CLINIC | Age: 44
Discharge: HOME | End: 2019-03-24
Attending: EMERGENCY MEDICINE | Admitting: EMERGENCY MEDICINE
Payer: MEDICARE

## 2019-03-24 ENCOUNTER — MYC MEDICAL ADVICE (OUTPATIENT)
Dept: NEUROLOGY | Facility: CLINIC | Age: 44
End: 2019-03-24

## 2019-03-24 VITALS
SYSTOLIC BLOOD PRESSURE: 145 MMHG | OXYGEN SATURATION: 96 % | WEIGHT: 315 LBS | BODY MASS INDEX: 37.19 KG/M2 | RESPIRATION RATE: 15 BRPM | HEIGHT: 77 IN | DIASTOLIC BLOOD PRESSURE: 106 MMHG | TEMPERATURE: 98.7 F

## 2019-03-24 DIAGNOSIS — M79.662 PAIN OF LEFT CALF: ICD-10-CM

## 2019-03-24 PROCEDURE — 99283 EMERGENCY DEPT VISIT LOW MDM: CPT | Mod: Z6 | Performed by: EMERGENCY MEDICINE

## 2019-03-24 PROCEDURE — 99282 EMERGENCY DEPT VISIT SF MDM: CPT | Performed by: EMERGENCY MEDICINE

## 2019-03-24 ASSESSMENT — MIFFLIN-ST. JEOR: SCORE: 2499.25

## 2019-03-24 NOTE — DISCHARGE INSTRUCTIONS
Please continue follow-up as planned and scheduled with your physicians    Continue taking all your regular medications.    Return immediately to the emergency department for any increased pain, swelling, drainage, skin discoloration, difficulty walking, or any other concerns of worsening.

## 2019-03-24 NOTE — ED TRIAGE NOTES
"Patient BIBA for LLE pain starting yesterday. Seen at [facility] yesterday and was told he had \"low blood flow to extremity\".  Patient woke up in worsening pain and had numbness to LLE.     Blood sugar 108, VSS per EMS.       "

## 2019-03-24 NOTE — ED AVS SNAPSHOT
[facility], [place], Emergency Department  [address] [place] [id number]  Phone:  [phone]                                    Joel Pineda   MRN: [id number]    Department:  [facility], Emergency Department   Date of Visit:  3/24/2019           After Visit Summary Signature Page    I have received my discharge instructions, and my questions have been answered. I have discussed any challenges I see with this plan with the nurse or doctor.    ..........................................................................................................................................  Patient/Patient Representative Signature      ..........................................................................................................................................  Patient Representative Print Name and Relationship to Patient    ..................................................               ................................................  Date                                   Time    ..........................................................................................................................................  Reviewed by Signature/Title    ...................................................              ..............................................  Date                                               Time          22EPIC Rev 08/18

## 2019-03-24 NOTE — ED PROVIDER NOTES
"  History     Chief Complaint   Patient presents with     Leg Pain     Numbness     to [facility]  Joel Pineda is a 43 year old male with multiple medical problems including ankylosing spondylitis, chronic back pain, chronic pain syndrome,, history of pulmonary embolism, and peripheral polyneuropathy followed by neurology.  He recently completed an EMG consistent with polyneuropathy and is following with neuro for this.    8 hours ago he was seen in emergency department for evaluation of left calf pain.  He developed this while walking and was concerned that perhaps he had developed an infection where the EMG needles were stuck.  During that visit he reports the physician found no evidence of infection but was concerned about claudication and vascular flow to the extremity.  Per the patient he suggested that a stent may be necessary and that he should return for any worsening symptoms.    The patient was discharged just after midnight and after returning home later developed increasing Pain and so presented to the emergency department here today.      Currently denies any active Pain while at rest.  No fevers or chills.  No skin discoloration.  No new trauma.  No chest pain or shortness of breath, no pleurisy.        I have reviewed the Medications, Allergies, Past Medical and Surgical History, and Social History in the Epic system.    Review of Systems   14 point review of symptoms was performed and is negative except as noted above.     Physical Exam   BP: (!) 145/106  Heart Rate: 93  Temp: 98.7  F (37.1  C)  Resp: 15  Height: 195.6 cm (6' 5\")  Weight: 149.7 kg (330 lb)  SpO2: 96 %      Physical Exam  GEN: Well appearing, non toxic, cooperative and conversant.   HEENT: The head is normocephalic and atraumatic. Pupils are equal round and reactive to light. Extraocular motions are intact. There is no facial swelling.   CV: Regular rate   PULM: Unlabored breathing     EXT: Full range of motion.  No edema.  The " calves are symmetric bilaterally.  There is no tenderness to palpation of the entire calf body.  EHL FHL and TA 5 out of 5, sensory intact to light touch.  There is a 1+ DP pulse bilaterally.   NEURO: Cranial nerves II through XII are intact and symmetric. Bilateral upper and lower extremities grossly show full range of motion without any focal deficits.     PSYCH: Calm and cooperative, mildly anxious.     ED Course        Procedures               Labs Ordered and Resulted from Time of ED Arrival Up to the Time of Departure from the ED - No data to display         Assessments & Plan (with Medical Decision Making)   43-year-old male presenting with concern for left calf pain which is now resolved.  Differential diagnosis is broad including vascular insufficiency, DVT, arterial embolism, cellulitis, neuropathy, Baker's cyst, among other causes.    Clinically the patient's exam is quite reassuring.  He has normal neurologic peripheral exam as noted above.  His calves are symmetric and currently nontender.  His pulses are symmetric.  He has no thigh tenderness.  His exam is not consistent with DVT or pulmonary embolism.  Review of the arterial study performed during the most recent ED visit shows a normal study with mild disease about the left dorsalis pedis artery.  This would not be a etiology of claudication.    His laboratory studies recently were also unrevealing including a normal CPK.  Given the patient's clinical course, recent workup, and current examination, my suspicion for acute musculoskeletal, vascular or neurologic emergent process is unlikely.  We discussed this with the patient.  He feels reassured.  He will follow-up with his primary neurologist and doctors as scheduled.  He will return for any worsening symptoms.    - Patient agrees to our plan and is ready and eager for discharge. Care plan, follow up plan, and reasons to return immediately to the ED were dicussed in detail and summarized as noted in  the discharge instructions.      I have reviewed the nursing notes.    I have reviewed the findings, diagnosis, plan and need for follow up with the patient.       Medication List      There are no discharge medications for this visit.         Final diagnoses:   Pain of left calf       3/24/2019   [facility], [place], EMERGENCY DEPARTMENT     Ray Gonzalez MD  03/24/19 0637

## 2019-03-24 NOTE — ED NOTES
Bed: ED10  Expected date: 3/24/19  Expected time: 5:56 AM  Means of arrival:   Comments:  A 89. 43M c/o LLE pain. Told yesterday by [facility] that he has low blood flow to leg. Pain increasing.

## 2019-03-25 ENCOUNTER — OFFICE VISIT (OUTPATIENT)
Dept: FAMILY MEDICINE | Facility: CLINIC | Age: 44
End: 2019-03-25
Payer: MEDICARE

## 2019-03-25 ENCOUNTER — TELEPHONE (OUTPATIENT)
Dept: OTHER | Facility: CLINIC | Age: 44
End: 2019-03-25

## 2019-03-25 VITALS
RESPIRATION RATE: 21 BRPM | WEIGHT: 315 LBS | OXYGEN SATURATION: 96 % | SYSTOLIC BLOOD PRESSURE: 123 MMHG | BODY MASS INDEX: 37.19 KG/M2 | TEMPERATURE: 97.4 F | HEART RATE: 76 BPM | DIASTOLIC BLOOD PRESSURE: 78 MMHG | HEIGHT: 77 IN

## 2019-03-25 DIAGNOSIS — M79.662 PAIN OF LEFT CALF: Primary | ICD-10-CM

## 2019-03-25 DIAGNOSIS — I73.9 CLAUDICATION (H): ICD-10-CM

## 2019-03-25 DIAGNOSIS — R07.89 ATYPICAL CHEST PAIN: ICD-10-CM

## 2019-03-25 DIAGNOSIS — M45.8 ANKYLOSING SPONDYLITIS OF SACRAL REGION (H): ICD-10-CM

## 2019-03-25 DIAGNOSIS — M51.369 DDD (DEGENERATIVE DISC DISEASE), LUMBAR: ICD-10-CM

## 2019-03-25 DIAGNOSIS — G63 POLYNEUROPATHY ASSOCIATED WITH UNDERLYING DISEASE (H): ICD-10-CM

## 2019-03-25 PROCEDURE — 99214 OFFICE O/P EST MOD 30 MIN: CPT | Performed by: FAMILY MEDICINE

## 2019-03-25 PROCEDURE — 93000 ELECTROCARDIOGRAM COMPLETE: CPT | Performed by: FAMILY MEDICINE

## 2019-03-25 RX ORDER — OXYCODONE HYDROCHLORIDE 5 MG/1
5-10 TABLET ORAL EVERY 6 HOURS PRN
Qty: 35 TABLET | Refills: 0 | Status: SHIPPED | OUTPATIENT
Start: 2019-03-25 | End: 2019-04-05

## 2019-03-25 ASSESSMENT — ANXIETY QUESTIONNAIRES
5. BEING SO RESTLESS THAT IT IS HARD TO SIT STILL: NOT AT ALL
2. NOT BEING ABLE TO STOP OR CONTROL WORRYING: SEVERAL DAYS
1. FEELING NERVOUS, ANXIOUS, OR ON EDGE: MORE THAN HALF THE DAYS
IF YOU CHECKED OFF ANY PROBLEMS ON THIS QUESTIONNAIRE, HOW DIFFICULT HAVE THESE PROBLEMS MADE IT FOR YOU TO DO YOUR WORK, TAKE CARE OF THINGS AT HOME, OR GET ALONG WITH OTHER PEOPLE: SOMEWHAT DIFFICULT
7. FEELING AFRAID AS IF SOMETHING AWFUL MIGHT HAPPEN: SEVERAL DAYS
6. BECOMING EASILY ANNOYED OR IRRITABLE: NEARLY EVERY DAY
GAD7 TOTAL SCORE: 10
3. WORRYING TOO MUCH ABOUT DIFFERENT THINGS: MORE THAN HALF THE DAYS

## 2019-03-25 ASSESSMENT — PAIN SCALES - GENERAL: PAINLEVEL: NO PAIN (0)

## 2019-03-25 ASSESSMENT — PATIENT HEALTH QUESTIONNAIRE - PHQ9
5. POOR APPETITE OR OVEREATING: SEVERAL DAYS
SUM OF ALL RESPONSES TO PHQ QUESTIONS 1-9: 12

## 2019-03-25 ASSESSMENT — MIFFLIN-ST. JEOR: SCORE: 2531

## 2019-03-25 NOTE — PROGRESS NOTES
SUBJECTIVE:   Joel Pineda is a 43 year old male who presents to clinic today for the following health issues:    ED/UC Followup:    Facility:  [facility]  Date of visit: 3/23/19 and 3/24/19  3/23-   ED COURSE:  Laboratory:  Labs Reviewed   BASIC METAB PROFILE - Abnormal; Notable for the following components:   Result Value   GLUCOSE 113 (H)   All other components within normal limits   HEMOGLOBIN - Normal   CK TOTAL - Normal     Imaging:  US ART EXTREMITY LOW LT NO JAQUELINE   Final Result   IMPRESSION:     Findings consistent with mild left dorsalis pedis artery disease. Otherwise normal examination.     MDM:   Joel Pineda is a 43 y.o. male with a history of a neuropathy of the lower extremities secondary to diabetes mellitus. He also has a history of ankylosing spondylitis, hypertension, high cholesterol, obesity, and degenerative joint disease. The patient evidently had an EMG of both the upper extremities and lower extremities done yesterday. Today however, he states that he's been having increasing left calf pain. In addition, he states that this is mainly muscular in nature. He states that he tried to go to for walk earlier, but that he would have to stop every 5 minutes or so because his left leg was aching and cramping. He states he's not had problems with this in the past. On examination, the patient denies any fevers or chills. He denies any other recent trauma. He denies any calf swelling either. It was also noted on arrival here that his left foot is a bit cooler than the right foot. The patient does have a history of PE in the remote past. He is not currently on any anticoagulants. Here, he states he otherwise denies any shortness of breath or chest pain. He is only complaining of his chronic pain. After initial evaluation, the patient's left foot does seem slightly cooler than the right. There's also diminished pulses to the both DP and PT of the left foot. His  left femoral pulse is actually quite strong. His cap refill is possibly slightly diminished in the left foot. There is no calf swelling or tenderness noted and there was no edema noted. After initial evaluation, I thoroughly reviewed the patient's old chart. At this point, I've held off on any pain medications. I did do a CK level to evaluate for any possibility of rhabdomyolysis. The patient's chemistry profile is normal. His HGB is normal at 15.6. At this point, an arterial ultrasound of the left lower extremity was performed, which showed findings consistent with mild left DP artery disease. I discussed the findings with the patient, that he has a flow-related problem as suspected, but that we could not currently pinpoint where that area is. I discussed with the patient that he needs to be started on a daily blood thinner for this. He also needs good hydration to help with the symptoms. He is scheduled to see his neurologist in the next 3 days and I recommended he follow this up with the Neurologist as well as obtain a referral for a Vascular Surgeon to evaluate the blood flow in his left lower extremity. I re-evaluated the patient. I still see no signs consistent with a DVT. At this point, I think he can be discharged to home.     Of Note: The patient's presentation required a high degree of complex medical decision making. Significant potential risks were present and assessed.     DIAGNOSIS:   ICD-10-CM   1. Decreased dorsalis pedis pulse R09.89   left sided, artery disease   2. Claudication of left lower extremity with exercise I73.9    3/24-   43-year-old male presenting with concern for left calf pain which is now resolved.  Differential diagnosis is broad including vascular insufficiency, DVT, arterial embolism, cellulitis, neuropathy, Baker's cyst, among other causes.     Clinically the patient's exam is quite reassuring.  He has normal neurologic peripheral exam as noted above.  His calves are symmetric  and currently nontender.  His pulses are symmetric.  He has no thigh tenderness.  His exam is not consistent with DVT or pulmonary embolism.  Review of the arterial study performed during the most recent ED visit shows a normal study with mild disease about the left dorsalis pedis artery.  This would not be a etiology of claudication.     His laboratory studies recently were also unrevealing including a normal CPK.  Given the patient's clinical course, recent workup, and current examination, my suspicion for acute musculoskeletal, vascular or neurologic emergent process is unlikely.  We discussed this with the patient.  He feels reassured.  He will follow-up with his primary neurologist and doctors as scheduled.  He will return for any worsening symptoms.      Reason for visit: left leg pain  Current Status: still having intermittent pain     Leg Pain    Onset: 3/23/19    Description:   Location: left mid calf and knee pain  Character: Stabbing    Intensity: 0/10    Progression of Symptoms: intermittent    Accompanying Signs & Symptoms:  Other symptoms: none    History:   Previous similar pain: No      Precipitating factors:   Trauma or overuse: no     Alleviating factors:  Improved by: unsure    Therapies Tried and outcome: aspirin    -Patient had an EMG four days ago. The next day he developed severe left calf pain and went to the ED. He went home and went to sleep and woke up with worse symptoms. He called an ambulance and went to a different ED. He feels like he got two different opinions at each ED and is not sure what to do , but would like a referral to vascular   -Patient has only had very mild pain since yesterday and thinks it could be due to being more mindful and aware of his pain. Denies new swelling or redness in lower extremity since ultrasound   -Experienced mild chest burning but thinks this was due to stress and anxiety. He has not had any new SOB, dizziness, or pleuritic discomfort.  Non-exertional.    -Has an appointment with neurology for the end of this week to discuss polyneuropathy and EMG results     Problem list and histories reviewed & adjusted, as indicated.  Additional history: as documented    Patient Active Problem List   Diagnosis     Major depressive disorder, recurrent episode (H)     Intermittent asthma     Hyperlipidemia LDL goal <100     Chronic nonallergic rhinitis     Diverticulosis     GERD (gastroesophageal reflux disease)     Anxiety     LLOYD (obstructive sleep apnea)- mild (AHI 11)     Intracranial arachnoid cyst     Facet arthritis of cervical region (H)     Acquired hypothyroidism     Bipolar 2 disorder (H)     Chronic midline low back pain without sciatica     Irritable bowel syndrome with diarrhea     B12 deficiency     Essential hypertension with goal blood pressure less than 140/90     Chronic pain syndrome     Ankylosing spondylitis of sacral region (H)     Morbid obesity due to hypertriglyceridemia (H)     Fatty infiltration of liver     DDD (degenerative disc disease), lumbar     Type 2 diabetes mellitus with complication, without long-term current use of insulin (H)     Peripheral polyneuropathy     History of pulmonary embolism     Past Surgical History:   Procedure Laterality Date     BACK SURGERY  10/07    lumbar discectomy L5-S1     COLONOSCOPY      Note: colon[facility] scheduled [facility] on Friday, 9/4/15     COSMETIC SURGERY  2012    Nose Exterior - functional     GI SURGERY  August 2013    Sigmoidectomy     HERNIA REPAIR, UMBILICAL  8/23/11    Dr. Evan whiting     INCISION AND DRAINAGE, ABSCESS, COMPLEX  8/23/11    umbilical, Dr. Evan Beavers     LAPAROSCOPIC ASSISTED COLECTOMY LEFT (DESCENDING)  8/15/2013    Procedure: LAPAROSCOPIC ASSISTED COLECTOMY LEFT (DESCENDING);  Laparoscopic Hand Assisted Sigmoid Resection, Mobilization of Splenic Fissure, coloproctoscopy, *Latex Free Room* Anesthesia General with Pain block  ;  Surgeon: Aurora Justice,  "MD;  Location: [facility] OR     NERVE SURGERY  8/18/11    RF ablation @ L3-S1 @ [facility]     RECONSTRUCT NOSE AND SEPTUM (FUNCTIONAL)  10/14/2011    Procedure:RECONSTRUCT NOSE AND SEPTUM (FUNCTIONAL); Functional Septorhinoplasty, Turbinate Reduction, ; Surgeon:CEDRIC CUEVAS; Location:[facility] OR     SINUS SURGERY  10/1/01    ethmoidectomy chronic sinusitis       Social History     Tobacco Use     Smoking status: Never Smoker     Smokeless tobacco: Never Used   Substance Use Topics     Alcohol use: Yes     Alcohol/week: 0.0 oz     Comment: occ 1/week     Family History   Problem Relation Age of Onset     Musculoskeletal Disorder Mother         back     Anxiety Disorder Mother      Colon Polyps Mother      Ulcerative Colitis Mother         and ischemic small intestine, surgery     Hypertension Mother      Breast Cancer Mother      Osteoporosis Mother      Diabetes Mother         Type 2, Diagnosed in 2014     Depression Mother         Takes Cymbalta to help with chronic pain + depx     Thyroid Disease Mother         Hypothyroidism     Obesity Mother         Under much better control latter half of 2015     Musculoskeletal Disorder Father         back     Substance Abuse Father      Hypertension Father      Hyperlipidemia Father      Depression Father         Off meds for many years. Seems \"ok\"     Heart Disease Maternal Grandmother      Heart Disease Maternal Grandfather      Psychotic Disorder Paternal Grandfather      Suicide Paternal Grandfather      Depression Paternal Grandfather         Pediatrician. Committed suicide by pistol in 1990.     Musculoskeletal Disorder Brother         back     Depression Brother         Expressed as anger and moodiness     Substance Abuse Sister      Depression Sister         Mental Health Therapist, yet no anti-depressants?     Anxiety Disorder Sister         Mental Health Therapist, yet no anti-anxiety meds?     Other Cancer Other         Bladder Cancer - Fatal     Substance Abuse Brother      " "Colon Cancer No family hx of      Crohn's Disease No family hx of      Anesthesia Reaction No family hx of      Cancer No family hx of         No family history of skin cancer           Reviewed and updated as needed this visit by clinical staff  Tobacco  Allergies  Meds  Med Hx  Surg Hx  Fam Hx  Soc Hx      Reviewed and updated as needed this visit by Provider         ROS:  Constitutional, HEENT, cardiovascular, pulmonary, gi and gu systems are negative, except as otherwise noted.    This document serves as a record of the services and decisions personally performed by ROSIO QUIJANO. It was created on his/her behalf by Scott Guzman, a trained medical scribe. The creation of this document is based on the provider's statements to the medical scribe. Scott Guzman, 2019 12:05 PM  OBJECTIVE:     /78 (BP Location: Right arm, Patient Position: Chair, Cuff Size: Adult Large)   Pulse 76   Temp 97.4  F (36.3  C) (Tympanic)   Resp 21   Ht 1.956 m (6' 5\")   Wt (!) 152.9 kg (337 lb)   SpO2 96%   BMI 39.96 kg/m    Body mass index is 39.96 kg/m .  GENERAL: healthy, alert and no distress  NECK: no adenopathy, no asymmetry, masses, or scars and thyroid normal to palpation  RESP: lungs clear to auscultation - no rales, rhonchi or wheezes   CV: regular rate and rhythm, normal S1 S2, no S3 or S4, no murmur, click or rub. Reduced DP pulses bilaterally   ABDOMEN: soft, nontender, no hepatosplenomegaly, no masses and bowel sounds normal  MS: no tenderness over left calf. no gross musculoskeletal defects noted, no edema  PSYCH: mentation appears normal, affect normal/bright    3/22/19 EMG:   Interpretation:  This is an abnormal study, demonstrating electrophysiologic evidence of the followin. 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  2. No evidence of a demyelinating polyneuropathy as can occur with TNF-alpha inhibitors.  3. Cannot exclude a superimposed axonal " injury in the left sciatic distribution. See comment.     Comment:  The nerve conduction abnormalities are consistently more severe in the left lower limb. The patient does report several prior left foot injuries, which may be a contributing factor but is unlikely to fully explain the asymmetry. The absence of findings on needle electromyography and the normal upper limb studies reduce the likelihood of an active mononeuropathy multiplex.     EKG - appears normal, NSR, normal axis, normal intervals, no acute ST/T changes c/w ischemia, no LVH by voltage criteria, not substantially changed from prior  ASSESSMENT/PLAN:     1. Pain of left calf  2. Claudication (H)  Unclear etiology of pain which is largely resolved. Certainly has some mild PAD based on the arterial us but second onset of pain was from sleep and non-exertional.  Sciatica/post-procedural pain also in ddx. Currently legs are warm with nl cap refill. Mild reduction in bilateral dorsalis pedis pulses and no new .  Patient will discuss with neurology this week and schedule appointment with vascular   - VASCULAR SURGERY REFERRAL  - VASCULAR SURGERY REFERRAL    3. Atypical chest pain  Patient attributes this to anxiety but got ekg today to ensure no new changes.   - EKG 12-lead complete w/read - Clinics    4. DDD (degenerative disc disease), lumbar  5. Ankylosing spondylitis of sacral region (H)  Stable, continue current medication   - oxyCODONE (ROXICODONE) 5 MG tablet; Take 1-2 tablets (5-10 mg) by mouth every 6 hours as needed for pain (maximum 4 tablet(s) per day)  Dispense: 35 tablet; Refill: 0      Patient Instructions   Discuss your symptoms with your neurologist this week.     A  from the vascular team will call you.      Length of visit was 22 minutes with more than 50 percent of that time used for discussing medical concerns and education    The information in this document, created by the medical scribe for me, accurately reflects the  services I personally performed and the decisions made by me. I have reviewed and approved this document for accuracy.   Lenore Fox MD  [facility]

## 2019-03-25 NOTE — TELEPHONE ENCOUNTER
Pt referred to [facility] by Lenore Fox MD for left calf pain, claudication.    3/23/19 left arterial US report available in Care Everywhere.      Pt needs to be scheduled for JAQUELINE w/ exercise and consult with Vascular Medicine.  Will route to scheduling to coordinate an appointment at next available.    ISHA Cortez RN

## 2019-03-25 NOTE — PATIENT INSTRUCTIONS
Discuss your symptoms with your neurologist this week.     A  from the vascular team will call you.

## 2019-03-26 ENCOUNTER — ANCILLARY PROCEDURE (OUTPATIENT)
Dept: ULTRASOUND IMAGING | Facility: CLINIC | Age: 44
End: 2019-03-26
Attending: FAMILY MEDICINE
Payer: MEDICARE

## 2019-03-26 ENCOUNTER — MYC MEDICAL ADVICE (OUTPATIENT)
Dept: FAMILY MEDICINE | Facility: CLINIC | Age: 44
End: 2019-03-26

## 2019-03-26 ENCOUNTER — PATIENT OUTREACH (OUTPATIENT)
Dept: CARE COORDINATION | Facility: CLINIC | Age: 44
End: 2019-03-26

## 2019-03-26 DIAGNOSIS — R07.89 BURNING CHEST PAIN: ICD-10-CM

## 2019-03-26 DIAGNOSIS — M79.662 PAIN OF LEFT CALF: ICD-10-CM

## 2019-03-26 DIAGNOSIS — Z86.711 HISTORY OF PULMONARY EMBOLISM: ICD-10-CM

## 2019-03-26 DIAGNOSIS — M79.662 PAIN OF LEFT CALF: Primary | ICD-10-CM

## 2019-03-26 LAB — D DIMER PPP FEU-MCNC: <0.3 UG/ML FEU (ref 0–0.5)

## 2019-03-26 PROCEDURE — 36415 COLL VENOUS BLD VENIPUNCTURE: CPT | Performed by: FAMILY MEDICINE

## 2019-03-26 PROCEDURE — 85379 FIBRIN DEGRADATION QUANT: CPT | Performed by: FAMILY MEDICINE

## 2019-03-26 PROCEDURE — 93971 EXTREMITY STUDY: CPT | Mod: LT | Performed by: RADIOLOGY

## 2019-03-26 ASSESSMENT — ANXIETY QUESTIONNAIRES: GAD7 TOTAL SCORE: 10

## 2019-03-26 NOTE — TELEPHONE ENCOUNTER
Central Prior Authorization Team   Phone: [phone]    PA Initiation    Medication: PA for omega-3 acid ethyl esters (LOVAZA) 1 g capsule  Insurance Company: [employer] Phone [phone] Fax [phone]  Pharmacy Filling the Rx: [id number] - [place], [place] - [address]  Filling Pharmacy Phone: [phone]  Filling Pharmacy Fax:    Start Date: 3/26/2019     Key: [id number] - PA Case ID: [id number] - Rx #: [id number]

## 2019-03-26 NOTE — PROGRESS NOTES
Clinic Care Coordination Contact    Situation: Patient chart reviewed by care coordinator.    Background:43 year old male with multiple medical problems including ankylosing spondylitis, chronic back pain, chronic pain syndrome,, history of pulmonary embolism, and peripheral polyneuropathy followed by neurology.  He recently completed an EMG consistent with polyneuropathy and is following with neuro for this. ([facility])     Seen on 3/23/19 in [facility] emergency department for evaluation of left calf pain.  He developed this while walking and was concerned that perhaps he had developed an infection where the EMG needles were stuck.  During that visit he reports the physician found no evidence of infection but was concerned about claudication and vascular flow to the extremity.  Per the patient he suggested that a stent may be necessary and that he should return for any worsening symptoms.     The patient was discharged just after midnight and after returning home later developed increasing Pain and so presented to the emergency department eight hours later at [facility].       Currently denies any active Pain while at rest.  No fevers or chills.  No skin discoloration.  No new trauma.  No chest pain or shortness of breath, no pleurisy.    Assessment: Patient was seen by PCP on 3/25/19 and referral was sent to vascular.     Plan/Recommendations: Patient will follow up with vascular, appointment is scheduled for 4/1/19.   Patient has follow up appointment with neuro on 3/29/19.    Clinic care coordination is not indicated at this time.     Mary Stout RN, CCM - Care Coordinator     3/26/2019    8:23 AM  [phone]

## 2019-03-26 NOTE — TELEPHONE ENCOUNTER
Prior Authorization Approval    Authorization Effective Date: 1/1/2019  Authorization Expiration Date: 3/25/2020  Medication: PA for omega-3 acid ethyl esters (LOVAZA) 1 g capsule  Approved Dose/Quantity:    Reference #:     Insurance Company: [employer] [phone] Fax [phone]  Expected CoPay:       CoPay Card Available:      Foundation Assistance Needed:    Which Pharmacy is filling the prescription (Not needed for infusion/clinic administered): [id number] - [place], [place] - [address]  Pharmacy Notified: Yes  Patient Notified: Yes

## 2019-03-28 NOTE — TELEPHONE ENCOUNTER
Dr. Malone will review with patient at his appointment tomorrow.     Caitlin Alvarado RN   Pulmonary/Rheumatology Care Coordinator  [facility]

## 2019-03-29 ENCOUNTER — OFFICE VISIT (OUTPATIENT)
Dept: NEUROLOGY | Facility: CLINIC | Age: 44
End: 2019-03-29
Payer: MEDICARE

## 2019-03-29 VITALS
OXYGEN SATURATION: 94 % | BODY MASS INDEX: 39.94 KG/M2 | WEIGHT: 315 LBS | SYSTOLIC BLOOD PRESSURE: 130 MMHG | HEART RATE: 67 BPM | DIASTOLIC BLOOD PRESSURE: 81 MMHG

## 2019-03-29 DIAGNOSIS — G63 POLYNEUROPATHY ASSOCIATED WITH UNDERLYING DISEASE (H): Primary | ICD-10-CM

## 2019-03-29 PROCEDURE — 99214 OFFICE O/P EST MOD 30 MIN: CPT | Performed by: PSYCHIATRY & NEUROLOGY

## 2019-03-29 ASSESSMENT — PAIN SCALES - GENERAL: PAINLEVEL: MILD PAIN (3)

## 2019-03-29 NOTE — LETTER
3/29/2019         RE: Joel Pineda  [address] [place] [address]        Dear Colleague,    Thank you for referring your patient, Joel Pineda, to the [facility]. Please see a copy of my visit note below.    Movement [facility] follow up note    Patient: Joel Pineda  Medical Record Number: [id number]  Encounter Date: March 29, 2019  PCP:Ksenia Lyles    CC: Peripheral neuropathy    Impression:  1.  Peripheral neuropathy most likely secondary to diabetes mellitus-2  2.  Acute left calf pain.    He has tenderness about the Achilles tendon and its insertion onto the gastrocnemius on the left.  This came on right after an EMG and I wonder if there was some bleeding that dissected down around the tendon and cause some inflammation.  It is also possible that he has inflammation secondary to his ankylosing spondylitis.  In any rate it is getting better.  I do not think is related at all to his neuropathy.    We believe his neuropathy is due to diabetes.  He is controlling his diabetes and this is the best thing to prevent progression.  He says his mother has neuropathy and he does have some high arches raising the possibility of hereditary neuropathy as well.    Recommendations:  1.  Check in with rheumatologist about his acute left calf pain.  2.  Close blood sugar control for his diabetic neuropathy.  3.  See back in 1 year for surveillance of his neuropathy.    Return to clinic: 1 year    Interval Hx:: Mr. Joel Pineda returns to clinic today for follow-up of peripheral neuropathy.  Dr. hawk did an EMG on him.  I went over this with him.  He has unequivocal findings of a mild distal sensory motor peripheral neuropathy affecting the legs.  This is almost certainly due to diabetes.  His paraneoplastic panel and serum protein electrophoresis was negative.    The day after the EMG he developed acute calf pain.  This was on the left.  He did have a left gastrocnemius  needle.  He went to [place] for he had an ultrasound that ruled out DVT.  There was a question of a decreased pulse on the left.  The pain became severe and he went to the [facility] ER where he was evaluated and discharged.  Was felt that he did not have severe vascular compromise of his lower extremity.  He is now having improvement in the pain but now is persisting at rest.  There is no redness in the area of his pain.    On examination he has tenderness at the area where the gastrocnemius fuses into the left Achilles tendon.  There is no redness or warmth.    Current medications    Movement Disorder-related Medications                                                                                                                                                             Current Outpatient Medications   Medication     acetaminophen 500 MG CAPS     [START ON 4/9/2019] adalimumab (HUMIRA *CF*) 40 MG/0.4ML pen kit     albuterol (PROAIR HFA/PROVENTIL HFA/VENTOLIN HFA) 108 (90 BASE) MCG/ACT Inhaler     ALPRAZolam (XANAX XR) 1 MG 24 hr tablet     aspirin (ASA) 81 MG tablet     blood glucose monitoring (NO BRAND SPECIFIED) meter device kit     celecoxib (CELEBREX) 200 MG capsule     cetirizine (ZYRTEC) 10 MG tablet     cyanocobalamin (VITAMIN B12) 1000 MCG/ML injection     EPINEPHrine (EPIPEN/ADRENACLICK/OR ANY BX GENERIC EQUIV) 0.3 MG/0.3ML injection 2-pack     fluticasone-salmeterol (ADVAIR DISKUS) 250-50 MCG/DOSE diskus inhaler     [place], Zingiber officinalis, (LIBIA ROOT) 550 MG CAPS capsule     hydrOXYzine (ATARAX) 50 MG tablet     lamoTRIgine (LAMICTAL) 100 MG tablet     levothyroxine (SYNTHROID/LEVOTHROID) 75 MCG tablet     lidocaine (XYLOCAINE) 5 % external ointment     loperamide (IMODIUM) 2 MG capsule     metFORMIN (GLUCOPHAGE-XR) 500 MG 24 hr tablet     metoprolol succinate ER (TOPROL-XL) 200 MG 24 hr tablet     montelukast (SINGULAIR) 10 MG tablet     omega-3 acid ethyl esters (LOVAZA) 1  g capsule     omeprazole 20 MG tablet     ondansetron (ZOFRAN) 8 MG tablet     order for DME     oxyCODONE (ROXICODONE) 5 MG tablet     pseudoePHEDrine (SUDAFED) 120 MG 12 hr tablet     ramipril (ALTACE) 10 MG capsule     risperiDONE (RISPERDAL) 0.5 MG tablet     rizatriptan (MAXALT-MLT) 5 MG ODT tab     rosuvastatin (CRESTOR) 40 MG tablet     syringe, disposable, (BD TUBERCULIN SYRINGE) 1 ML MISC     tiZANidine (ZANAFLEX) 4 MG tablet     vitamin C (ASCORBIC ACID) 500 MG tablet     vitamin D3 (CHOLECALCIFEROL) 42780 UNITS capsule     pregabalin (LYRICA) 150 MG capsule     No current facility-administered medications for this visit.        Examination:  /81   Pulse 67   Wt (!) 152.8 kg (336 lb 12.8 oz)   SpO2 94%   BMI 39.94 kg/m     General- no distress, no rash, good pulses, no edema  Respiratory-auscultation over the anterior lung fields is clear  Cardiac- regular rate and rhythm    Neurologic  Mental status  Patient is alert, appropriate, speech is fluent and comprehension is intact    Cranial nerve testing  Pupils are equal and reactive to light, visual fields are full to confrontation bilaterally  Extraocular movements are full  Facial sensation is intact, face is symmetric with rest and activation  Palate rises symmetrically, tongue protrudes at midline with normal movements  Sterocleidomastoid and trapezius strength is normal and symmetric    Motor  Bulk and tone are normal  Strength is 5/5 shoulder abduction, finger abduction, arm flexion and extension, hip flexion, plantar and dorsiflexion    Sensation  Intact to pin, vibration   Proprioception intact in great toes and fingers    Tendon reflexes  Testing at brachioradialis, biceps, triceps, patella and achilles bilaterally showed normal reflexes, symmetrically, without Babinski or Johnson signs    Coordination  Finger nose finger testing: normalRapid alternating movements are normal.  Gait and Station: normal    25 minutes of total time was spent  face-to-face with the patient over 50% of which was counseling..      Again, thank you for allowing me to participate in the care of your patient.        Sincerely,        Ray Malone MD

## 2019-03-29 NOTE — PROGRESS NOTES
[facility] follow up note    Patient: Joel Pineda  Medical Record Number: [id number]  Encounter Date: March 29, 2019  PCP:Ksenia Lyles    CC: Peripheral neuropathy    Impression:  1.  Peripheral neuropathy most likely secondary to diabetes mellitus-2  2.  Acute left calf pain.    He has tenderness about the Achilles tendon and its insertion onto the gastrocnemius on the left.  This came on right after an EMG and I wonder if there was some bleeding that dissected down around the tendon and cause some inflammation.  It is also possible that he has inflammation secondary to his ankylosing spondylitis.  In any rate it is getting better.  I do not think is related at all to his neuropathy.    We believe his neuropathy is due to diabetes.  He is controlling his diabetes and this is the best thing to prevent progression.  He says his mother has neuropathy and he does have some high arches raising the possibility of hereditary neuropathy as well.    Recommendations:  1.  Check in with rheumatologist about his acute left calf pain.  2.  Close blood sugar control for his diabetic neuropathy.  3.  See back in 1 year for surveillance of his neuropathy.    Return to clinic: 1 year    Interval Hx:: Mr. Joel Pineda returns to clinic today for follow-up of peripheral neuropathy.  Dr. hawk did an EMG on him.  I went over this with him.  He has unequivocal findings of a mild distal sensory motor peripheral neuropathy affecting the legs.  This is almost certainly due to diabetes.  His paraneoplastic panel and serum protein electrophoresis was negative.    The day after the EMG he developed acute calf pain.  This was on the left.  He did have a left gastrocnemius needle.  He went to [place] for he had an ultrasound that ruled out DVT.  There was a question of a decreased pulse on the left.  The pain became severe and he went to the [facility] ER where he was evaluated and discharged.  Was  felt that he did not have severe vascular compromise of his lower extremity.  He is now having improvement in the pain but now is persisting at rest.  There is no redness in the area of his pain.    On examination he has tenderness at the area where the gastrocnemius fuses into the left Achilles tendon.  There is no redness or warmth.    Current medications    Movement Disorder-related Medications                                                                                                                                                             Current Outpatient Medications   Medication     acetaminophen 500 MG CAPS     [START ON 4/9/2019] adalimumab (HUMIRA *CF*) 40 MG/0.4ML pen kit     albuterol (PROAIR HFA/PROVENTIL HFA/VENTOLIN HFA) 108 (90 BASE) MCG/ACT Inhaler     ALPRAZolam (XANAX XR) 1 MG 24 hr tablet     aspirin (ASA) 81 MG tablet     blood glucose monitoring (NO BRAND SPECIFIED) meter device kit     celecoxib (CELEBREX) 200 MG capsule     cetirizine (ZYRTEC) 10 MG tablet     cyanocobalamin (VITAMIN B12) 1000 MCG/ML injection     EPINEPHrine (EPIPEN/ADRENACLICK/OR ANY BX GENERIC EQUIV) 0.3 MG/0.3ML injection 2-pack     fluticasone-salmeterol (ADVAIR DISKUS) 250-50 MCG/DOSE diskus inhaler     [place], Zingiber officinalis, (LIBIA ROOT) 550 MG CAPS capsule     hydrOXYzine (ATARAX) 50 MG tablet     lamoTRIgine (LAMICTAL) 100 MG tablet     levothyroxine (SYNTHROID/LEVOTHROID) 75 MCG tablet     lidocaine (XYLOCAINE) 5 % external ointment     loperamide (IMODIUM) 2 MG capsule     metFORMIN (GLUCOPHAGE-XR) 500 MG 24 hr tablet     metoprolol succinate ER (TOPROL-XL) 200 MG 24 hr tablet     montelukast (SINGULAIR) 10 MG tablet     omega-3 acid ethyl esters (LOVAZA) 1 g capsule     omeprazole 20 MG tablet     ondansetron (ZOFRAN) 8 MG tablet     order for DME     oxyCODONE (ROXICODONE) 5 MG tablet     pseudoePHEDrine (SUDAFED) 120 MG 12 hr tablet     ramipril (ALTACE) 10 MG capsule     risperiDONE (RISPERDAL)  0.5 MG tablet     rizatriptan (MAXALT-MLT) 5 MG ODT tab     rosuvastatin (CRESTOR) 40 MG tablet     syringe, disposable, (BD TUBERCULIN SYRINGE) 1 ML MISC     tiZANidine (ZANAFLEX) 4 MG tablet     vitamin C (ASCORBIC ACID) 500 MG tablet     vitamin D3 (CHOLECALCIFEROL) 22940 UNITS capsule     pregabalin (LYRICA) 150 MG capsule     No current facility-administered medications for this visit.        Examination:  /81   Pulse 67   Wt (!) 152.8 kg (336 lb 12.8 oz)   SpO2 94%   BMI 39.94 kg/m    General- no distress, no rash, good pulses, no edema  Respiratory-auscultation over the anterior lung fields is clear  Cardiac- regular rate and rhythm    Neurologic  Mental status  Patient is alert, appropriate, speech is fluent and comprehension is intact    Cranial nerve testing  Pupils are equal and reactive to light, visual fields are full to confrontation bilaterally  Extraocular movements are full  Facial sensation is intact, face is symmetric with rest and activation  Palate rises symmetrically, tongue protrudes at midline with normal movements  Sterocleidomastoid and trapezius strength is normal and symmetric    Motor  Bulk and tone are normal  Strength is 5/5 shoulder abduction, finger abduction, arm flexion and extension, hip flexion, plantar and dorsiflexion    Sensation  Intact to pin, vibration   Proprioception intact in great toes and fingers    Tendon reflexes  Testing at brachioradialis, biceps, triceps, patella and achilles bilaterally showed normal reflexes, symmetrically, without Babinski or Johnson signs    Coordination  Finger nose finger testing: normalRapid alternating movements are normal.  Gait and Station: normal    25 minutes of total time was spent face-to-face with the patient over 50% of which was counseling..

## 2019-03-29 NOTE — NURSING NOTE
Joel Pineda's goals for this visit include: return  He requests these members of his care team be copied on today's visit information:     PCP: Ksenia Lyles    Referring Provider:  No referring provider defined for this encounter.    /81   Pulse 67   Wt (!) 152.8 kg (336 lb 12.8 oz)   SpO2 94%   BMI 39.94 kg/m      Do you need any medication refills at today's visit? n

## 2019-04-01 ENCOUNTER — TELEPHONE (OUTPATIENT)
Dept: RHEUMATOLOGY | Facility: CLINIC | Age: 44
End: 2019-04-01

## 2019-04-01 ENCOUNTER — OFFICE VISIT (OUTPATIENT)
Dept: OTHER | Facility: CLINIC | Age: 44
End: 2019-04-01
Attending: INTERNAL MEDICINE
Payer: MEDICARE

## 2019-04-01 VITALS
OXYGEN SATURATION: 95 % | RESPIRATION RATE: 18 BRPM | HEIGHT: 77 IN | DIASTOLIC BLOOD PRESSURE: 83 MMHG | WEIGHT: 315 LBS | BODY MASS INDEX: 37.19 KG/M2 | HEART RATE: 77 BPM | SYSTOLIC BLOOD PRESSURE: 144 MMHG

## 2019-04-01 DIAGNOSIS — M79.662 PAIN OF LEFT CALF: ICD-10-CM

## 2019-04-01 DIAGNOSIS — I73.9 CLAUDICATION (H): ICD-10-CM

## 2019-04-01 LAB
ALBUMIN SERPL-MCNC: 4.3 G/DL (ref 3.4–5)
ALP SERPL-CCNC: 111 U/L (ref 40–150)
ALT SERPL W P-5'-P-CCNC: 42 U/L (ref 0–70)
ANION GAP SERPL CALCULATED.3IONS-SCNC: 5 MMOL/L (ref 3–14)
AST SERPL W P-5'-P-CCNC: 36 U/L (ref 0–45)
BASOPHILS # BLD AUTO: 0.1 10E9/L (ref 0–0.2)
BASOPHILS NFR BLD AUTO: 0.8 %
BILIRUB SERPL-MCNC: 0.4 MG/DL (ref 0.2–1.3)
BUN SERPL-MCNC: 13 MG/DL (ref 7–30)
CALCIUM SERPL-MCNC: 9.4 MG/DL (ref 8.5–10.1)
CHLORIDE SERPL-SCNC: 108 MMOL/L (ref 94–109)
CO2 SERPL-SCNC: 28 MMOL/L (ref 20–32)
CREAT SERPL-MCNC: 0.93 MG/DL (ref 0.66–1.25)
CRP SERPL-MCNC: <2.9 MG/L (ref 0–8)
DIFFERENTIAL METHOD BLD: NORMAL
EOSINOPHIL # BLD AUTO: 0.2 10E9/L (ref 0–0.7)
EOSINOPHIL NFR BLD AUTO: 2.1 %
ERYTHROCYTE [DISTWIDTH] IN BLOOD BY AUTOMATED COUNT: 13.4 % (ref 10–15)
ERYTHROCYTE [SEDIMENTATION RATE] IN BLOOD BY WESTERGREN METHOD: 4 MM/H (ref 0–15)
GFR SERPL CREATININE-BSD FRML MDRD: >90 ML/MIN/{1.73_M2}
GLUCOSE SERPL-MCNC: 124 MG/DL (ref 70–99)
HCT VFR BLD AUTO: 46.8 % (ref 40–53)
HGB BLD-MCNC: 16 G/DL (ref 13.3–17.7)
LYMPHOCYTES # BLD AUTO: 4.4 10E9/L (ref 0.8–5.3)
LYMPHOCYTES NFR BLD AUTO: 42.6 %
MCH RBC QN AUTO: 30.1 PG (ref 26.5–33)
MCHC RBC AUTO-ENTMCNC: 34.2 G/DL (ref 31.5–36.5)
MCV RBC AUTO: 88 FL (ref 78–100)
MONOCYTES # BLD AUTO: 1.3 10E9/L (ref 0–1.3)
MONOCYTES NFR BLD AUTO: 12.3 %
NEUTROPHILS # BLD AUTO: 4.3 10E9/L (ref 1.6–8.3)
NEUTROPHILS NFR BLD AUTO: 42.2 %
PLATELET # BLD AUTO: 271 10E9/L (ref 150–450)
POTASSIUM SERPL-SCNC: 4.6 MMOL/L (ref 3.4–5.3)
PROT SERPL-MCNC: 7.5 G/DL (ref 6.8–8.8)
RBC # BLD AUTO: 5.32 10E12/L (ref 4.4–5.9)
SODIUM SERPL-SCNC: 141 MMOL/L (ref 133–144)
WBC # BLD AUTO: 10.3 10E9/L (ref 4–11)

## 2019-04-01 PROCEDURE — 86140 C-REACTIVE PROTEIN: CPT | Performed by: INTERNAL MEDICINE

## 2019-04-01 PROCEDURE — 99204 OFFICE O/P NEW MOD 45 MIN: CPT | Mod: ZP | Performed by: INTERNAL MEDICINE

## 2019-04-01 PROCEDURE — 80053 COMPREHEN METABOLIC PANEL: CPT | Performed by: INTERNAL MEDICINE

## 2019-04-01 PROCEDURE — 36415 COLL VENOUS BLD VENIPUNCTURE: CPT | Performed by: INTERNAL MEDICINE

## 2019-04-01 PROCEDURE — 85652 RBC SED RATE AUTOMATED: CPT | Performed by: INTERNAL MEDICINE

## 2019-04-01 PROCEDURE — 85025 COMPLETE CBC W/AUTO DIFF WBC: CPT | Performed by: INTERNAL MEDICINE

## 2019-04-01 PROCEDURE — G0463 HOSPITAL OUTPT CLINIC VISIT: HCPCS

## 2019-04-01 SDOH — HEALTH STABILITY: MENTAL HEALTH: HOW OFTEN DO YOU HAVE SIX OR MORE DRINKS ON ONE OCCASION?: WEEKLY

## 2019-04-01 SDOH — HEALTH STABILITY: MENTAL HEALTH: HOW MANY DRINKS CONTAINING ALCOHOL DO YOU HAVE ON A TYPICAL DAY WHEN YOU ARE DRINKING?: 1 OR 2

## 2019-04-01 ASSESSMENT — MIFFLIN-ST. JEOR: SCORE: 2526.47

## 2019-04-01 ASSESSMENT — PAIN SCALES - GENERAL: PAINLEVEL: MILD PAIN (2)

## 2019-04-01 NOTE — PROGRESS NOTES
"Joel Pineda is a 43 year old male who presents for:  Chief Complaint   Patient presents with     RECHECK     New consult for claudication, ref by Dr. Fox, records in Epic         Vitals:    Vitals:    04/01/19 1401 04/01/19 1404   BP: 127/85 144/83   BP Location: Left arm Right arm   Patient Position: Chair Chair   Cuff Size: Adult Large Adult Large   Pulse: 77    Resp: 18    SpO2: 95%    Weight: (!) 336 lb (152.4 kg)    Height: 6' 5\" (1.956 m)        BMI:  Estimated body mass index is 39.84 kg/m  as calculated from the following:    Height as of this encounter: 6' 5\" (1.956 m).    Weight as of this encounter: 336 lb (152.4 kg).    Pain Score:  Data Unavailable        Mireya Aggarwal MA    "

## 2019-04-01 NOTE — PROGRESS NOTES
Vascular Medicine Consultation     Chief Complaint   Left calf pain    Date of Admission:  (Not on file)    Joel Pineda is a 43 year old male who who is here for left calf pain.    Code Status    Full code    Reason for Consult   Reason for consult: I was asked by PCP to evaluate this patient for left calf pain.    Primary Care Physician   Ksenia Lyles      History is obtained from the patient    History of Present Illness   Joel Pineda is a 43 year old male who presents with left calf pain, patient patient had a complex medical history of peripheral neuropathy, type 2 diabetes mellitus recently diagnosed with A1c of 6.1% down from 6.7% chronic pain syndrome, ankylosing spondylitis on TNF treatment morbid obesity and essential hypertension patient recently was in the ER for left calf pain, patient had Doppler venous ultrasound which ruled out any history of DVT or any DVT, patient also had Doppler arterial ultrasound which showed no significant changes apart from low velocity and dorsalis pedis artery on the left side  Patient complains of left calf pain, dull aching pain, its in the back of the calf at the middle of the leg, pain can be provoked by exercise, walking, and relieved by rest sometimes it will, on its own while resting, pain does not radiate, patient denies any history of skin color changes or skin temperature changes yet he will have breast pain sometimes patient has no significant family history of PAD, but he has significant history of coronary artery disease  Patient has chronic back pain secondary to ankylosing spondylitis, pain will flareup with some sciatica symptoms, now his back is not showing any active pain right now  Patient will be started on Humira very soon for his ankylosing spondylitis  Patient denies any history of urinary or GI symptoms currently  Patient had a provoked PE in the past and it was thought to be secondary to inflammatory process  Patient has no past  medical history of blood clots yet he has history of family or family history of blood clots his sister had a blood clot in the lower extremity secondary to hormonal treatment and his brother had the same exact thing secondary to very long flight  Patient denies any history of constitutional symptoms, no change in appetite, no change in weight, he did mention that he has history of rash involving the medial aspect of the hand and history of rash in the groin  Patient denies any history and of pain in the small joints of the hand or the foot    Past Medical History   I have reviewed this patient's medical history and updated it with pertinent information if needed.   Past Medical History:   Diagnosis Date     Acne      Allergic state      Anxiety      Bipolar 2 disorder (H)      Chest pain     Chest pain, regulated w/BP meds. Clear arteries.     Chronic pain      Depressive disorder      Diabetes (H)      Diverticulosis      Gastroesophageal reflux disease      Hypertension      IBS (irritable bowel syndrome)      Intracranial arachnoid cyst      Polyneuropathy      Pulmonary embolism (H)      Skin exam, screening for cancer 12/3/2013     Sleep apnea      Uncomplicated asthma        Past Surgical History   I have reviewed this patient's surgical history and updated it with pertinent information if needed.  Past Surgical History:   Procedure Laterality Date     BACK SURGERY  10/07    lumbar discectomy L5-S1     COLONOSCOPY      Note: colonoscopy scheduled with [facility] on Friday, 9/4/15     COSMETIC SURGERY  2012    Nose Exterior - functional     GI SURGERY  August 2013    Sigmoidectomy     HERNIA REPAIR, UMBILICAL  8/23/11    Dr. Evan whiting     INCISION AND DRAINAGE, ABSCESS, COMPLEX  8/23/11    umbilical, Dr. Evan Beavers     LAPAROSCOPIC ASSISTED COLECTOMY LEFT (DESCENDING)  8/15/2013    Procedure: LAPAROSCOPIC ASSISTED COLECTOMY LEFT (DESCENDING);  Laparoscopic Hand Assisted Sigmoid Resection, Mobilization  of Splenic Fissure, coloproctoscopy, *Latex Free Room* Anesthesia General with Pain block  ;  Surgeon: Aurora Justice MD;  Location: [facility] OR     NERVE SURGERY  8/18/11    RF ablation @ L3-S1 @ [facility]     RECONSTRUCT NOSE AND SEPTUM (FUNCTIONAL)  10/14/2011    Procedure:RECONSTRUCT NOSE AND SEPTUM (FUNCTIONAL); Functional Septorhinoplasty, Turbinate Reduction, ; Surgeon:CEDRIC CUEVAS; Location:[facility] OR     SINUS SURGERY  10/1/01    ethmoidectomy chronic sinusitis       Prior to Admission Medications   Cannot display prior to admission medications because the patient has not been admitted in this contact.     Allergies   Allergies   Allergen Reactions     Banana Shortness Of Breath     Pt reports organic Banana is okay.      Nitroglycerin Palpitations     Penicillins Anaphylaxis     Provigil [Modafinil] Shortness Of Breath     headache     Ciprofloxacin Palpitations, Other (See Comments) and Rash     dizziness (versus metronidazole taken at same time)     Gadolinium Hives and Itching     Patient was premedicated for the contrast allergy. He did still have a reaction a few hours after injection. Hives and itching. Dr. Gomez told tech to inform pt he should only have contrast again in the future when premedicated and at a hospital. Not at an outpatient facility.      Dulera Other (See Comments)     Hoarse voice     Dye [Contrast Dye] Other (See Comments) and Hives     Moderate flushing, CT contrast     Golimumab      Neurontin [Gabapentin] Hives     Moderate hives     Nortriptyline Hives     Varicella Virus Vaccine Live      Rash     Ciprofloxacin Palpitations     Flagyl [Metronidazole Hcl] Palpitations and Hives     Latex Rash     Metronidazole Palpitations, Other (See Comments) and Rash     dizziness (versus ciprofloxacin taken at same time)     No Clinical Screening - See Comments Rash     Nitrile gloves       Social History   I have reviewed this patient's social history and updated it with pertinent  "information if needed. Joel Pineda  reports that  has never smoked. he has never used smokeless tobacco. He reports that he drinks alcohol. He reports that he does not use drugs.    Family History   I have reviewed this patient's family history and updated it with pertinent information if needed.   Family History   Problem Relation Age of Onset     Musculoskeletal Disorder Mother         back     Anxiety Disorder Mother      Colon Polyps Mother      Ulcerative Colitis Mother         and ischemic small intestine, surgery     Hypertension Mother      Breast Cancer Mother      Osteoporosis Mother      Diabetes Mother         Type 2, Diagnosed in 2014     Depression Mother         Takes Cymbalta to help with chronic pain + depx     Thyroid Disease Mother         Hypothyroidism     Obesity Mother         Under much better control latter half of 2015     Musculoskeletal Disorder Father         back     Substance Abuse Father      Hypertension Father      Hyperlipidemia Father      Depression Father         Off meds for many years. Seems \"ok\"     Heart Disease Maternal Grandmother      Heart Disease Maternal Grandfather      Psychotic Disorder Paternal Grandfather      Suicide Paternal Grandfather      Depression Paternal Grandfather         Pediatrician. Committed suicide by pistol in 1990.     Musculoskeletal Disorder Brother         back     Depression Brother         Expressed as anger and moodiness     Substance Abuse Sister      Depression Sister         Mental Health Therapist, yet no anti-depressants?     Anxiety Disorder Sister         Mental Health Therapist, yet no anti-anxiety meds?     Other Cancer Other         Bladder Cancer - Fatal     Substance Abuse Brother      Colon Cancer No family hx of      Crohn's Disease No family hx of      Anesthesia Reaction No family hx of      Cancer No family hx of         No family history of skin cancer       Review of Systems   The 10 point Review of Systems is negative " other than noted in the HPI or here.     Physical Exam       BP: 144/83 Pulse: 77   Resp: 18 SpO2: 95 %      Vital Signs with Ranges  Pulse:  [77] 77  Resp:  [18] 18  BP: (127-144)/(83-85) 144/83  SpO2:  [95 %] 95 %  336 lbs 0 oz    Constitutional: awake, alert, cooperative, no apparent distress, and appears stated age  Eyes: Lids and lashes normal, pupils equal, round and reactive to light, extra ocular muscles intact, sclera clear, conjunctiva normal  ENT: normocepalic, without obvious abnormality, oropharynx pink and moist  Hematologic / Lymphatic: no lymphadenopathy  Respiratory: No increased work of breathing, good air exchange, clear to auscultation bilaterally, no crackles or wheezing  Cardiovascular: regular rate and rhythm, normal S1 and S2 and no murmur noted  GI: Normal bowel sounds, soft, non-distended, non-tender  Skin: no redness, warmth, or swelling, no rashes and no lesions  Musculoskeletal: There is no redness, warmth, or swelling of the joints.  Full range of motion noted.  Motor strength is 5 out of 5 all extremities bilaterally.  Tone is normal.  Neurologic: Awake, alert, oriented to name, place and time.  Cranial nerves II-XII are grossly intact.  Motor is 5 out of 5 bilaterally.    Neuropsychiatric:  Normal affect, memory, insight.  Pulses: Femoral pulse +1 on the right side very weak and difficult to be palpated on the left side could not appreciate popliteal pulses bilaterally, dopplerable PT pulses bilaterally and they are triphasic in signal monophasic DP pulse on the right foot almost undetectable week DP on the left foot  . No carotid bruits appreciated.     Data   Most Recent 3 CBC's:  Recent Labs   Lab Test 01/03/19  0844 09/19/18  1512 05/31/18  0951   WBC 8.6 10.5 10.6   HGB 15.8 15.3 15.0   MCV 89 89 90    239 259     Most Recent 3 BMP's:  Recent Labs   Lab Test 03/19/19  0807 01/03/19  0844 09/19/18  1512    138 141   POTASSIUM 4.3 4.1 4.6   CHLORIDE 106 104 106   CO2  27 25 26   BUN 12 12 10   CR 1.00 0.90 0.96   ANIONGAP 7 9 9   ALYCE 9.5 9.6 9.7   * 110* 135*     Most Recent 2 LFT's:  Recent Labs   Lab Test 03/19/19  0807 01/03/19  0844   AST 38 73*   ALT 51 67   ALKPHOS 97 106   BILITOTAL 0.4 0.5     Most Recent 3 INR's:  Recent Labs   Lab Test 03/18/15  0912 03/13/15  1235 03/03/15   INR 0.90 1.00 2.5*     Most Recent 3 Creatinines:  Recent Labs   Lab Test 03/19/19  0807 01/03/19  0844 09/19/18  1512   CR 1.00 0.90 0.96     Most Recent 3 Troponin's:  Recent Labs   Lab Test 01/17/15  1549 03/17/13  2057   TROPI  --  <0.012   TROPONIN 0.00  --      Most Recent TSH and T4:  Recent Labs   Lab Test 12/24/18  1114  08/07/15  0846   TSH 2.63   < >  --    T4  --   --  1.18    < > = values in this interval not displayed.     Most Recent Hemoglobin A1c:  Recent Labs   Lab Test 03/19/19  0807   A1C 6.1*     Most Recent 6 glucoses:  Recent Labs   Lab Test 03/19/19  0807 01/03/19  0844 09/19/18  1512 04/19/18  1116 03/12/18  1559 01/18/18  0834   * 110* 135* 135* 95 106*     Most Recent Urinalysis:  Recent Labs   Lab Test 03/12/18  1617 09/02/16  0918   COLOR Yellow Yellow   APPEARANCE Clear Clear   URINEGLC Negative Negative   URINEBILI Negative Negative   URINEKETONE Negative Negative   SG 1.009 1.015   UBLD Negative Negative   URINEPH 6.0 7.5*   PROTEIN Negative Negative   UROBILINOGEN  --  0.2   NITRITE Negative Negative   LEUKEST Negative Negative   RBCU O - 2  --    WBCU 0 - 5  --      Most Recent ABG:  Recent Labs   Lab Test 06/16/11  2040   PH 7.43   PO2 104   PCO2 37   HCO3 24   MATT 0.3     Most Recent ESR & CRP:  Recent Labs   Lab Test 02/13/16  1510   SED 6   CRP 3.4         Assessment & Plan   (M79.662) Pain of left calf  Comment: Pain left calf not clear that it is secondary to PAD as the flow velocities on Doppler arterial ultrasound looks normal there is no focal stenoses or any abnormality the only abnormality is low velocity of flow in the DP pulses but that  is an area which is below the area of concern  We will obtain lab work to rule out the possibility of vasculitis as the patient does have ankylosing spondylitis and evidence of autoimmune joints involvement  Plan: CBC with platelets differential, Comprehensive         metabolic panel, CRP inflammation, Erythrocyte         sedimentation rate auto, Vasculitis panel,         Follow-Up with Vascular Medicine, CANCELED: US         JAQUELINE Doppler with Exercise            (I73.9) Claudication (H)  Comment: Multiple views that it is vascular in origin  Plan: CBC with platelets differential, Comprehensive         metabolic panel, CRP inflammation, Erythrocyte         sedimentation rate auto, Vasculitis panel, US         JAQUELINE Doppler with Exercise Bilateral, CANCELED:         US JAQUELINE Doppler with Exercise with toe pressures              Summary: We will see the patient once test results are available    Gian Marino MD

## 2019-04-01 NOTE — TELEPHONE ENCOUNTER
"Rheumatology Telephone Note:    I called and spoke with Mr. Pineda.  He was concerned about enthesitis.  I reviewed several recent evaluations.  Dr. Malone documents that there was an EMG needle at the site of pain that could be related.  He also documents \"He has tenderness about the Achilles tendon and its insertion onto the gastrocnemius on the left\" - I explained to Mr. Pineda that this is not the enthesis and therefore less likely related to AS. Also seeing vascular and having workup for possible PAD.  Vasculitis being considered.  No other symptoms such as shortness of breath, chest pain, hemoptysis, or hematuria to suggest vasculitis; but overall getting a workup by vascular at this time.  Asked that he keep me informed.     All questions were answered and he thanked me for the call.     Oneil Baxter MD  4/1/2019 4:59 PM    "

## 2019-04-01 NOTE — TELEPHONE ENCOUNTER
Reason for call:  Symptom   Symptom or request: severe tendon pain    Duration (how long have symptoms been present): off and on for 6 weeks  Have you been treated for this before? Yes    Additional comments: Seen at [place] [facility] 9 days ago and wonders how to manage enthesitis.    Phone number to reach patient:  Other phone number:  [phone]*    Best Time:  any    Can we leave a detailed message on this number?  YES

## 2019-04-03 ENCOUNTER — OFFICE VISIT (OUTPATIENT)
Dept: URGENT CARE | Facility: URGENT CARE | Age: 44
End: 2019-04-03
Payer: MEDICARE

## 2019-04-03 VITALS
TEMPERATURE: 97.2 F | HEART RATE: 71 BPM | SYSTOLIC BLOOD PRESSURE: 138 MMHG | DIASTOLIC BLOOD PRESSURE: 83 MMHG | OXYGEN SATURATION: 98 %

## 2019-04-03 DIAGNOSIS — I73.9 CLAUDICATION (H): ICD-10-CM

## 2019-04-03 DIAGNOSIS — L23.9 ALLERGIC CONTACT DERMATITIS, UNSPECIFIED TRIGGER: Primary | ICD-10-CM

## 2019-04-03 DIAGNOSIS — M79.662 PAIN OF LEFT CALF: ICD-10-CM

## 2019-04-03 PROCEDURE — 99214 OFFICE O/P EST MOD 30 MIN: CPT | Performed by: NURSE PRACTITIONER

## 2019-04-03 PROCEDURE — 83876 ASSAY MYELOPEROXIDASE: CPT | Performed by: INTERNAL MEDICINE

## 2019-04-03 PROCEDURE — 36415 COLL VENOUS BLD VENIPUNCTURE: CPT | Performed by: INTERNAL MEDICINE

## 2019-04-03 PROCEDURE — 83516 IMMUNOASSAY NONANTIBODY: CPT | Performed by: INTERNAL MEDICINE

## 2019-04-03 RX ORDER — TRIAMCINOLONE ACETONIDE 1 MG/G
OINTMENT TOPICAL 2 TIMES DAILY
Qty: 30 G | Refills: 0 | Status: SHIPPED | OUTPATIENT
Start: 2019-04-03 | End: 2019-04-27

## 2019-04-03 ASSESSMENT — ENCOUNTER SYMPTOMS
VOMITING: 0
COUGH: 0
DIAPHORESIS: 0
SHORTNESS OF BREATH: 0
SORE THROAT: 0
DIARRHEA: 0
RHINORRHEA: 0
FEVER: 0
NAUSEA: 0
CHILLS: 0

## 2019-04-03 NOTE — PATIENT INSTRUCTIONS
"  Patient Education     Contact Dermatitis  Contact dermatitis is a skin rash caused by something that touches the skin and makes it irritated and inflamed. Your skin may be red, swollen, dry, and may be cracked. Blisters may form and ooze. The rash will itch.  Contact dermatitis can form on the face and neck, backs of hands, forearms, genitals, and lower legs.  People can get contact dermatitis from lots of sources. These include:    Plants such as poison ivy, oak, or sumac    Chemicals in hair dyes and rinses, soaps, solvents, waxes, fingernail polish, and deodorants     Jewelry or watchbands made of nickel  Contact dermatitis is not passed from person to person.  Talk with your healthcare provider about what may have caused the rash. A type of allergy testing called \"patch testing\" may be used to discover what you are allergic to. You will need to avoid the source of your rash in the future to prevent it from coming back.  Treatment is done to relieve itching and prevent the rash from coming back. The rash should go away in a few days to a few weeks.  Home care  Your healthcare provider may prescribe medicine to relieve swelling and itching. Follow all instructions when using these medicines.  General care:    Avoid anything that heats up your skin, such as hot showers or baths, or direct sunlight. This can make itching worse.    Apply cold compresses to soothe your sores to help relieve your symptoms. Do this for 30 minutes 3 to 4 times a day. You can make a cold compress by soaking a cloth in cold water. Squeeze out excess water. You can add colloidal oatmeal to the water to help reduce itching. For severe itching in a small area, apply an ice pack wrapped in a thin towel. Do this for 20 minutes 3 to 4 times a day.    You can also try wet dressings. One way to do this is to wear a wet piece of clothing under a dry one. Wear a damp shirt under a dry shirt if your upper body is affected. This can relieve itching " and prevent you from scratching the affected area.    You can also help relieve large areas of itching by taking a lukewarm bath with colloidal oatmeal added to the water.    Use hydrocortisone cream for redness and irritation, unless another medicine was prescribed. You can also use benzocaine anesthetic cream or spray. Calamine lotion can also relieve mild symptoms.    Use oral diphenhydramine to help reduce itching. You can buy this antihistamine at drug and grocery stores. It can make you sleepy, so use lower doses during the daytime. Or you can use loratadine. This is an antihistamine that will not make you sleepy. Do not use diphenhydramine if you have glaucoma or have trouble urinating due to an enlarged prostate.    If a plant causes your rash, make sure to wash your skin and the clothes you were wearing when you came into contact with the plant. This is to wash away the plant oils that gave you the rash and prevent more or worse symptoms.    Stay away from the substance or object that causes your symptoms. If you can t avoid it, wear gloves or some other type of protection.  Follow-up care  Follow up with your healthcare provider, or as advised.  When to seek medical advice  Call your healthcare provider right away if any of these occur:    Spreading of the rash to other parts of your body    Severe swelling of your face, eyelids, mouth, throat or tongue    Trouble urinating due to swelling in the genital area    Fever of 100.4 F (38 C) or higher    Redness or swelling that gets worse    Pain that gets worse    Foul-smelling fluid leaking from the skin    Yellow-brown crusts on the open blisters  Date Last Reviewed: 9/1/2016 2000-2018 [employer]. [address] [place] [address], [place] [address]. All rights reserved. This information is not intended as a substitute for professional medical care. Always follow your healthcare professional's instructions.

## 2019-04-03 NOTE — PROGRESS NOTES
SUBJECTIVE:   Joel Pineda is a 43 year old male presenting with a chief complaint of   Chief Complaint   Patient presents with     Derm Problem     Patient complains of rash on both hands        He is an established patient of [facility].    Rash    Onset of rash was 4 day(s) ago.   Course of illness is worsening.  Severity moderate  Current and Associated symptoms: burning   Location of the rash: hands.  Previous history of a similar rash? Yes  Recent exposure history: none known  Denies exposure to: none known  Associated symptoms include: nothing.  Treatment measures tried include: none      Review of Systems   Constitutional: Negative for chills, diaphoresis and fever.   HENT: Negative for congestion, ear pain, rhinorrhea and sore throat.    Respiratory: Negative for cough and shortness of breath.    Gastrointestinal: Negative for diarrhea, nausea and vomiting.   Skin: Positive for rash.   All other systems reviewed and are negative.      Past Medical History:   Diagnosis Date     Acne      Allergic state      Anxiety      Bipolar 2 disorder (H)      Chest pain     Chest pain, regulated w/BP meds. Clear arteries.     Chronic pain      Depressive disorder      Diabetes (H)      Diverticulosis      Gastroesophageal reflux disease      Hypertension      IBS (irritable bowel syndrome)      Intracranial arachnoid cyst      Polyneuropathy      Pulmonary embolism (H)      Skin exam, screening for cancer 12/3/2013     Sleep apnea      Uncomplicated asthma      Family History   Problem Relation Age of Onset     Musculoskeletal Disorder Mother         back     Anxiety Disorder Mother      Colon Polyps Mother      Ulcerative Colitis Mother         and ischemic small intestine, surgery     Hypertension Mother      Breast Cancer Mother      Osteoporosis Mother      Diabetes Mother         Type 2, Diagnosed in 2014     Depression Mother         Takes Cymbalta to help with chronic pain + depx     Thyroid Disease Mother         " Hypothyroidism     Obesity Mother         Under much better control latter half of 2015     Musculoskeletal Disorder Father         back     Substance Abuse Father      Hypertension Father      Hyperlipidemia Father      Depression Father         Off meds for many years. Seems \"ok\"     Heart Disease Maternal Grandmother      Heart Disease Maternal Grandfather      Psychotic Disorder Paternal Grandfather      Suicide Paternal Grandfather      Depression Paternal Grandfather         Pediatrician. Committed suicide by pistol in 1990.     Musculoskeletal Disorder Brother         back     Depression Brother         Expressed as anger and moodiness     Substance Abuse Sister      Depression Sister         Mental Health Therapist, yet no anti-depressants?     Anxiety Disorder Sister         Mental Health Therapist, yet no anti-anxiety meds?     Other Cancer Other         Bladder Cancer - Fatal     Substance Abuse Brother      Colon Cancer No family hx of      Crohn's Disease No family hx of      Anesthesia Reaction No family hx of      Cancer No family hx of         No family history of skin cancer     Current Outpatient Medications   Medication Sig Dispense Refill     acetaminophen 500 MG CAPS Take 500 mg by mouth every 4 hours as needed 60 capsule      [START ON 4/9/2019] adalimumab (HUMIRA *CF*) 40 MG/0.4ML pen kit Inject 0.4 mLs (40 mg) Subcutaneous every 14 days . Hold for signs of infection, then seek medical attention. 0.8 mL 5     albuterol (PROAIR HFA/PROVENTIL HFA/VENTOLIN HFA) 108 (90 BASE) MCG/ACT Inhaler Inhale 2 puffs into the lungs every 4 hours as needed       ALPRAZolam (XANAX XR) 1 MG 24 hr tablet Take 1 mg by mouth daily       aspirin (ASA) 81 MG tablet Take 81 mg by mouth 2 times daily       blood glucose monitoring (NO BRAND SPECIFIED) meter device kit Use to test blood sugar 2 times daily or as directed. Include test strips (2 boxes) with 3 refills 1 kit 0     celecoxib (CELEBREX) 200 MG capsule TAKE " 1 CAPSULE BY MOUTH TWICE DAILY AS NEEDED FOR MODERATE PAIN. 180 capsule 0     cetirizine (ZYRTEC) 10 MG tablet Take 1 tablet (10 mg) by mouth At Bedtime 30 tablet 11     cyanocobalamin (VITAMIN B12) 1000 MCG/ML injection Inject 1 mL (1,000 mcg) into the muscle every 30 days 10 mL 0     EPINEPHrine (EPIPEN/ADRENACLICK/OR ANY BX GENERIC EQUIV) 0.3 MG/0.3ML injection 2-pack Inject 0.3 mLs (0.3 mg) into the muscle once as needed for anaphylaxis 0.6 mL 3     fluticasone-salmeterol (ADVAIR DISKUS) 250-50 MCG/DOSE diskus inhaler Inhale 1 puff into the lungs 2 times daily        Ginger, Zingiber officinalis, (GINGER ROOT) 550 MG CAPS capsule Take 550 mg by mouth Up to three times daily       hydrOXYzine (ATARAX) 50 MG tablet Take  mg by mouth as needed For anxiety and insomnia       lamoTRIgine (LAMICTAL) 100 MG tablet Take 200 mg by mouth daily       levothyroxine (SYNTHROID/LEVOTHROID) 75 MCG tablet TAKE 1 TABLET (75 MCG) BY MOUTH EVERY MORNING. 90 tablet 0     lidocaine (XYLOCAINE) 5 % external ointment UAD 30 g      loperamide (IMODIUM) 2 MG capsule Take 2 mg by mouth 4 times daily as needed for diarrhea       metFORMIN (GLUCOPHAGE-XR) 500 MG 24 hr tablet Take 2 tablets (1,000 mg) by mouth daily (with dinner) 180 tablet 3     metoprolol succinate ER (TOPROL-XL) 200 MG 24 hr tablet Take 2 tablets (400 mg) by mouth daily 180 tablet 1     montelukast (SINGULAIR) 10 MG tablet Take 10 mg by mouth daily        omega-3 acid ethyl esters (LOVAZA) 1 g capsule TAKE 2 CAPSULES BY MOUTH TWICE DAILY. 360 capsule 1     omeprazole 20 MG tablet Take 20 mg by mouth daily        ondansetron (ZOFRAN) 8 MG tablet TAKE 1 TABLET BY MOUTH EVERY 8 HOURS AS NEEDED FOR NAUSEA OR VOMITING 20 tablet 4     order for DME Respironics REMSTAR 60 Series Auto CPAP 9-13 cm H2O, Wisp nasal mask w/a large cushion and a chinstrap       oxyCODONE (ROXICODONE) 5 MG tablet Take 1-2 tablets (5-10 mg) by mouth every 6 hours as needed for pain (maximum 4  tablet(s) per day) 35 tablet 0     pregabalin (LYRICA) 150 MG capsule Take 1 capsule (150 mg) by mouth 2 times daily 60 capsule 11     pseudoePHEDrine (SUDAFED) 120 MG 12 hr tablet Take 1 tablet (120 mg) by mouth every 12 hours 28 tablet 0     ramipril (ALTACE) 10 MG capsule Take 1 capsule (10 mg) by mouth daily 90 capsule 1     risperiDONE (RISPERDAL) 0.5 MG tablet Take 0.5 mg by mouth daily       rizatriptan (MAXALT-MLT) 5 MG ODT tab Take 1 tablet (5 mg) by mouth at onset of headache for migraine 30 tablet 5     rosuvastatin (CRESTOR) 40 MG tablet Take 1 tablet (40 mg) by mouth daily 90 tablet 2     syringe, disposable, (BD TUBERCULIN SYRINGE) 1 ML MISC Equipment being ordered: 1 ml tuberculin syringes to be used for Vitamin B12 injections. 12 each 1     tiZANidine (ZANAFLEX) 4 MG tablet TAKE 1 TABLET BY MOUTH THREE TIMES DAILY AS NEEDED 90 tablet 4     triamcinolone (KENALOG) 0.1 % external ointment Apply topically 2 times daily for 14 days 30 g 0     vitamin C (ASCORBIC ACID) 500 MG tablet Take 500 mg by mouth daily       vitamin D3 (CHOLECALCIFEROL) 99222 UNITS capsule Take one capsule every two weeks. 24 capsule 1     Social History     Tobacco Use     Smoking status: Never Smoker     Smokeless tobacco: Never Used   Substance Use Topics     Alcohol use: Yes     Alcohol/week: 0.0 oz     Drinks per session: 1 or 2     Binge frequency: Weekly     Comment: occ 1/week       OBJECTIVE  /83 (BP Location: Left arm, Patient Position: Chair, Cuff Size: Adult Regular)   Pulse 71   Temp 97.2  F (36.2  C) (Oral)   SpO2 98%     Physical Exam   Constitutional: No distress.   Eyes: EOM are normal. Pupils are equal, round, and reactive to light.   Neck: Normal range of motion. Neck supple.   Cardiovascular: Normal rate and normal heart sounds.   Pulmonary/Chest: Effort normal and breath sounds normal. No respiratory distress.   Lymphadenopathy:     He has no cervical adenopathy.   Neurological: He is alert. No cranial  "nerve deficit.   Skin: Skin is warm and dry. He is not diaphoretic.   maculopapular erythematous rash on both hands, more concenrated on around index and middle fingers   Psychiatric: He has a normal mood and affect.   Nursing note and vitals reviewed.        ASSESSMENT:      ICD-10-CM    1. Allergic contact dermatitis, unspecified trigger L23.9 triamcinolone (KENALOG) 0.1 % external ointment            Differential Diagnosis:  Rash: Eczema  Flea bites  Hives  Scabies    Serious Comorbid Conditions:  Adult:  None    PLAN:  I recommend follow up with PCP i if symptoms are getting worse  All questions are answered and patient is in agreement with treatment plan  In addition to the above, diabetes was also briefly addressed today. Health maintenance, medications and recent results reviewed. Patient advised to follow up with PCP with any other concerns.    Mary Whyte  [facility]  Family Nurse Practitoner          Patient Instructions     Patient Education     Contact Dermatitis  Contact dermatitis is a skin rash caused by something that touches the skin and makes it irritated and inflamed. Your skin may be red, swollen, dry, and may be cracked. Blisters may form and ooze. The rash will itch.  Contact dermatitis can form on the face and neck, backs of hands, forearms, genitals, and lower legs.  People can get contact dermatitis from lots of sources. These include:    Plants such as poison ivy, oak, or sumac    Chemicals in hair dyes and rinses, soaps, solvents, waxes, fingernail polish, and deodorants     Jewelry or watchbands made of nickel  Contact dermatitis is not passed from person to person.  Talk with your healthcare provider about what may have caused the rash. A type of allergy testing called \"patch testing\" may be used to discover what you are allergic to. You will need to avoid the source of your rash in the future to prevent it from coming back.  Treatment is done to relieve itching and prevent the rash from " coming back. The rash should go away in a few days to a few weeks.  Home care  Your healthcare provider may prescribe medicine to relieve swelling and itching. Follow all instructions when using these medicines.  General care:    Avoid anything that heats up your skin, such as hot showers or baths, or direct sunlight. This can make itching worse.    Apply cold compresses to soothe your sores to help relieve your symptoms. Do this for 30 minutes 3 to 4 times a day. You can make a cold compress by soaking a cloth in cold water. Squeeze out excess water. You can add colloidal oatmeal to the water to help reduce itching. For severe itching in a small area, apply an ice pack wrapped in a thin towel. Do this for 20 minutes 3 to 4 times a day.    You can also try wet dressings. One way to do this is to wear a wet piece of clothing under a dry one. Wear a damp shirt under a dry shirt if your upper body is affected. This can relieve itching and prevent you from scratching the affected area.    You can also help relieve large areas of itching by taking a lukewarm bath with colloidal oatmeal added to the water.    Use hydrocortisone cream for redness and irritation, unless another medicine was prescribed. You can also use benzocaine anesthetic cream or spray. Calamine lotion can also relieve mild symptoms.    Use oral diphenhydramine to help reduce itching. You can buy this antihistamine at drug and grocery stores. It can make you sleepy, so use lower doses during the daytime. Or you can use loratadine. This is an antihistamine that will not make you sleepy. Do not use diphenhydramine if you have glaucoma or have trouble urinating due to an enlarged prostate.    If a plant causes your rash, make sure to wash your skin and the clothes you were wearing when you came into contact with the plant. This is to wash away the plant oils that gave you the rash and prevent more or worse symptoms.    Stay away from the substance or object  that causes your symptoms. If you can t avoid it, wear gloves or some other type of protection.  Follow-up care  Follow up with your healthcare provider, or as advised.  When to seek medical advice  Call your healthcare provider right away if any of these occur:    Spreading of the rash to other parts of your body    Severe swelling of your face, eyelids, mouth, throat or tongue    Trouble urinating due to swelling in the genital area    Fever of 100.4 F (38 C) or higher    Redness or swelling that gets worse    Pain that gets worse    Foul-smelling fluid leaking from the skin    Yellow-brown crusts on the open blisters  Date Last Reviewed: 9/1/2016 2000-2018 [employer]. [address] [place] [address], [place] [address]. All rights reserved. This information is not intended as a substitute for professional medical care. Always follow your healthcare professional's instructions.

## 2019-04-04 ENCOUNTER — ALLIED HEALTH/NURSE VISIT (OUTPATIENT)
Dept: EDUCATION SERVICES | Facility: CLINIC | Age: 44
End: 2019-04-04
Payer: MEDICARE

## 2019-04-04 ENCOUNTER — MYC MEDICAL ADVICE (OUTPATIENT)
Dept: FAMILY MEDICINE | Facility: CLINIC | Age: 44
End: 2019-04-04

## 2019-04-04 DIAGNOSIS — E11.8 TYPE 2 DIABETES MELLITUS WITH COMPLICATION, WITHOUT LONG-TERM CURRENT USE OF INSULIN (H): Primary | ICD-10-CM

## 2019-04-04 LAB
MYELOPEROXIDASE AB SER-ACNC: <0.2 AI (ref 0–0.9)
PROTEINASE3 IGG SER-ACNC: <0.2 AI (ref 0–0.9)

## 2019-04-04 PROCEDURE — G0109 DIAB MANAGE TRN IND/GROUP: HCPCS

## 2019-04-05 ENCOUNTER — OFFICE VISIT (OUTPATIENT)
Dept: FAMILY MEDICINE | Facility: CLINIC | Age: 44
End: 2019-04-05
Payer: MEDICARE

## 2019-04-05 VITALS
HEART RATE: 82 BPM | HEIGHT: 77 IN | DIASTOLIC BLOOD PRESSURE: 86 MMHG | WEIGHT: 315 LBS | BODY MASS INDEX: 37.19 KG/M2 | SYSTOLIC BLOOD PRESSURE: 132 MMHG | OXYGEN SATURATION: 97 % | TEMPERATURE: 98 F

## 2019-04-05 DIAGNOSIS — M47.812 FACET ARTHRITIS OF CERVICAL REGION: ICD-10-CM

## 2019-04-05 DIAGNOSIS — G89.4 CHRONIC PAIN SYNDROME: ICD-10-CM

## 2019-04-05 DIAGNOSIS — M45.8 ANKYLOSING SPONDYLITIS OF SACRAL REGION (H): ICD-10-CM

## 2019-04-05 DIAGNOSIS — M51.369 DDD (DEGENERATIVE DISC DISEASE), LUMBAR: Primary | ICD-10-CM

## 2019-04-05 PROCEDURE — 99213 OFFICE O/P EST LOW 20 MIN: CPT | Performed by: FAMILY MEDICINE

## 2019-04-05 RX ORDER — DULOXETIN HYDROCHLORIDE 30 MG/1
CAPSULE, DELAYED RELEASE ORAL
COMMUNITY
Start: 2019-04-04 | End: 2019-04-16 | Stop reason: DRUGHIGH

## 2019-04-05 RX ORDER — OXYCODONE HYDROCHLORIDE 5 MG/1
5-10 TABLET ORAL EVERY 6 HOURS PRN
Qty: 35 TABLET | Refills: 0 | Status: SHIPPED | OUTPATIENT
Start: 2019-04-08 | End: 2019-05-13

## 2019-04-05 ASSESSMENT — PAIN SCALES - GENERAL: PAINLEVEL: MILD PAIN (3)

## 2019-04-05 ASSESSMENT — MIFFLIN-ST. JEOR: SCORE: 2521.93

## 2019-04-05 NOTE — GROUP NOTE
Diabetes Self-Management Training Class 1    Joel Pineda presents today for group education related to Type 2 diabetes   He is accompanied by self      April 4  [facility]  Start and End Time  Start Time: 1000  End Time: 1130    Educational Topics Discussed  Pathophysiology of Type 2 Diabetes, Review of Blood Glucose Testing and Blood Glucose goal ranges, Accepting a Diagnosis of Diabetes, Exercise - why it is helpful for BG control, Carbohydrate Counting    Materials Provided  [facility] Taking Charge of Your Diabetes  [facility] Guide to Carbohydrate Counting    All questions were answered in the group setting. Patient to contact educator with specific questions, should need arise.    Plan and Follow-up  Patient to begin carb counting.  Patient to attend Diabetes Self-Management Training Class 2.  Patient to follow-up with educator regarding blood glucose values as determined at Diabetes Self-Management Training Initial Assessment Visit.     Patient-stated goal written and given to patient.           Ericka Mora RD

## 2019-04-10 ENCOUNTER — OFFICE VISIT (OUTPATIENT)
Dept: OTHER | Facility: CLINIC | Age: 44
End: 2019-04-10
Attending: INTERNAL MEDICINE
Payer: MEDICARE

## 2019-04-10 ENCOUNTER — HOSPITAL ENCOUNTER (OUTPATIENT)
Dept: ULTRASOUND IMAGING | Facility: CLINIC | Age: 44
Discharge: HOME | End: 2019-04-10
Attending: INTERNAL MEDICINE | Admitting: INTERNAL MEDICINE
Payer: MEDICARE

## 2019-04-10 VITALS
SYSTOLIC BLOOD PRESSURE: 118 MMHG | DIASTOLIC BLOOD PRESSURE: 77 MMHG | WEIGHT: 315 LBS | BODY MASS INDEX: 39.96 KG/M2 | OXYGEN SATURATION: 96 % | HEART RATE: 62 BPM

## 2019-04-10 DIAGNOSIS — M79.662 PAIN OF LEFT CALF: ICD-10-CM

## 2019-04-10 DIAGNOSIS — I73.9 CLAUDICATION (H): ICD-10-CM

## 2019-04-10 PROCEDURE — 93924 LWR XTR VASC STDY BILAT: CPT

## 2019-04-10 PROCEDURE — G0463 HOSPITAL OUTPT CLINIC VISIT: HCPCS

## 2019-04-10 PROCEDURE — 99214 OFFICE O/P EST MOD 30 MIN: CPT | Mod: ZP | Performed by: INTERNAL MEDICINE

## 2019-04-10 NOTE — NURSING NOTE
"Joel Pineda is a 43 year old male who presents for:  Chief Complaint   Patient presents with     RECHECK     JAQUELINE w/ toe pressures (1:00 VHC; 2:20 GHE) follow up labs         Vitals:    Vitals:    04/10/19 1403   BP: 118/77   BP Location: Right arm   Patient Position: Chair   Cuff Size: Adult Large   Pulse: 62   SpO2: 96%   Weight: (!) 337 lb (152.9 kg)       BMI:  Estimated body mass index is 39.96 kg/m  as calculated from the following:    Height as of 4/5/19: 6' 5\" (1.956 m).    Weight as of this encounter: 337 lb (152.9 kg).    Pain Score:  Data Unavailable        Yaa Hawkins MA    "

## 2019-04-10 NOTE — PROGRESS NOTES
Vascular Medicine Progress Note     Joel Pineda is a 43 year old male who is here for follow-up on JAQUELINE with toe pressures resting and post exercise    Interval History   Patient had JAQUELINE resting and post exercise with digital pressures, resting patient's JAQUELINE were completely normal his right first toe pressure was suboptimal and patient did show a biphasic waveform of the right DP artery that might indicate anterior tibial artery stenosis yet his JAQUELINE is normal, post exercise patient showed normal response to exercise    Patient did experience the pain in the left calf while exercising yet his test is completely normal  Patient's pain of the left mid calf most probably is secondary to radiculopathy that is induced by exercise most probably secondary to his ankylosing spondylitis    Patient might need to have MRI with no contrast for the lumbosacral spine    Physical Exam       BP: 118/77 Pulse: 62     SpO2: 96 %      Vitals:    04/10/19 1403   Weight: (!) 152.9 kg (337 lb)     Vital Signs with Ranges  Pulse:  [62] 62  BP: (118)/(77) 118/77  SpO2:  [96 %] 96 %  [unfilled]    Constitutional: awake, alert, cooperative, no apparent distress, and appears stated age  Eyes: Lids and lashes normal, pupils equal, round and reactive to light, extra ocular muscles intact, sclera clear, conjunctiva normal  ENT: normocepalic, without obvious abnormality, oropharynx pink and moist  Hematologic / Lymphatic: no lymphadenopathy  Respiratory: No increased work of breathing, good air exchange, clear to auscultation bilaterally, no crackles or wheezing  Cardiovascular: regular rate and rhythm, normal S1 and S2 and no murmur noted  GI: Normal bowel sounds, soft, non-distended, non-tender  Skin: no redness, warmth, or swelling, no rashes and no lesions  Musculoskeletal: There is no redness, warmth, or swelling of the joints.  Full range of motion noted.  Motor strength is 5 out of 5 all extremities bilaterally.  Tone is  normal.  Neurologic: Awake, alert, oriented to name, place and time.  Cranial nerves II-XII are grossly intact.  Motor is 5 out of 5 bilaterally.    Neuropsychiatric:  Normal affect, memory, insight.  Pulses: Intact pulsations  . No carotid bruits appreciated.     Medications         Data   Recent Results (from the past 24 hour(s))   US JAQUELINE Doppler with Exercise Bilateral    Narrative    PROCEDURE:   JAQUELINE with Doppler Waveforms, segmental pressures, and JAQUELINE exercise of  the bilateral lower extremities    DATE OF PROCEDURE:   4/10/2019 1:31 PM     CLINICAL HISTORY/INDICATION:  43-year-old diabetic male with intermittent pain/cramping in his legs  during walking.    COMPARISON:  None relevant    TECHNIQUE:  Resting and exercise ankle branchial indices and waveform analysis of  the bilateral lower extremities    FINDINGS:  The patient walked on a treadmill for 5 minutes at a 10% incline at  1.5 mph.  The patient had mild left distal calf pain at 45 seconds and  bilateral thigh pain at 2 minutes and 15 seconds..     The resting and exercise ABIs on the right are 1.23 and 1.24   respectively    The resting and exercise ABIs on the left are 1.26 and 1.26   respectively    Resting ABIs on the right 1.23 with multiphasic waveforms within the  posterior tibial artery. Monophasic waveforms within the dorsalis  pedis. Normal digital waveforms.    Resting ABIs on the right 1.26 with multiphasic waveforms in the  dorsalis pedis and posterior tibial distribution as well as normal  digital waveforms.      Impression    IMPRESSION:  Normal resting and exercise ABIs bilaterally. Diminished right  dorsalis pedis waveforms suggesting anterior tibial stenosis however  normal digital waveforms on the right.    JAQUELINE Diagnostic Criteria      >/= 1.3          Non compressible   0.95 - 1.29     Normal   0.90 - 0.94     Mild PAD   0.50 - 0.89     Moderate PAD   0.20 - 0.49     Severe PAD   < 0.20             Critical PAD    ORLY HERNANDEZ MD        Assessment & Plan   (W64.268) Pain of left calf  Comment: Most probably secondary to radicular pain, PAD highly unlikely to be causing pain in the lower extremities and if there was a pain it should be on the right lower extremity where the patient has a biphasic waveform pattern for his DP artery which he did not          Summary: Left leg pain highly unlikely to be secondary to PAD  Most probably this could be secondary to radiculopathy affecting Lt. posterior L5 dermatome    Patient might benefit from having MRI with no contrast for the lumbo-sacral spine    Please send a copy to Dr. Trupti Marino MD

## 2019-04-15 ENCOUNTER — TELEPHONE (OUTPATIENT)
Dept: FAMILY MEDICINE | Facility: CLINIC | Age: 44
End: 2019-04-15

## 2019-04-15 DIAGNOSIS — E03.9 ACQUIRED HYPOTHYROIDISM: ICD-10-CM

## 2019-04-15 NOTE — TELEPHONE ENCOUNTER
Reason for call:  Patient reporting a symptom    Symptom or request: spine/back pain - due to massage    Duration (how long have symptoms been present): yesterday    Have you been treated for this before? No    Additional comments: New pain - put ice pack on it for now, what other suggestions    Phone Number patient can be reached at:  Cell number on file:    Telephone Information:   Mobile [phone]       Best Time:  anytime    Can we leave a detailed message on this number:  YES    Call taken on 4/15/2019 at 10:10 AM by Mich Pineda

## 2019-04-15 NOTE — TELEPHONE ENCOUNTER
Joel Pineda is a 43 year old male who calls with bulging area of spine after a massage yesterday.    NURSING ASSESSMENT:  Description:  Patient states he has been seeing the same massage therapist for the last 6 months and has gotten 3-4 massages from them during this time period. Patient got a massage yesterday and reports that today he notices that his spine was hurting and when looking in the mirror he noticed a 3 to 6 inch bulge of his spine lengthwise and the it extends out 1/2 inch - located at the bottom of Thoracic spine and top of lumbar spine.   Onset/duration:  Noticed this AM. Pain since yesterday.   Precip. factors:  Patient had a massage yesterday.  Associated symptoms:  Back pain, a 3 to 6 in bulge of spine. Denies inability to walk, chest pain, shortness of breath, numbness or tingling.  Improves/worsens symptoms:  Patient has been trying to ice back. Patient asking for advice. Writer advised patient that writer cannot advise on anything as patient has new symptoms and would need to be evaluated by a provider for symptoms.  Pain scale (0-10)   5/10  LMP/preg/breast feeding:  N/A  Last exam/Treatment:  4/5/19  Allergies:   Allergies   Allergen Reactions     Banana Shortness Of Breath     Pt reports organic Banana is okay.      Nitroglycerin Palpitations     Penicillins Anaphylaxis     Provigil [Modafinil] Shortness Of Breath     headache     Ciprofloxacin Palpitations, Other (See Comments) and Rash     dizziness (versus metronidazole taken at same time)     Gadolinium Hives and Itching     Patient was premedicated for the contrast allergy. He did still have a reaction a few hours after injection. Hives and itching. Dr. Gomez told tech to inform pt he should only have contrast again in the future when premedicated and at a hospital. Not at an outpatient facility.      Dulera Other (See Comments)     Hoarse voice     Dye [Contrast Dye] Other (See Comments) and Hives     Moderate flushing, CT  contrast     Golimumab      Neurontin [Gabapentin] Hives     Moderate hives     Nortriptyline Hives     Varicella Virus Vaccine Live      Rash     Ciprofloxacin Palpitations     Flagyl [Metronidazole Hcl] Palpitations and Hives     Latex Rash     Metronidazole Palpitations, Other (See Comments) and Rash     dizziness (versus ciprofloxacin taken at same time)     No Clinical Screening - See Comments Rash     Nitrile gloves       MEDICATIONS:   Taking medication(s) as prescribed? Yes  Taking over the counter medication(s?) Yes  Any medication side effects? No significant side effects    Any barriers to taking medication(s) as prescribed?  No  Medication(s) improving/managing symptoms?  No  Medication reconciliation completed: Yes      NURSING PLAN: Routed to provider Yes    RECOMMENDED DISPOSITION:  See in 4 hours, another person to drive - Patient is refusing to schedule appointment at this time due to insurance concerns. Writer advised patient it is important for him to be seen today by a provider for symptoms and advisement. Patient states he will use an ice pack for a little longer and if no improvement will call back and schedule office visit with provider. Writer advised patient if he experiences new/worsening symptoms or any red flag symptom as bolded above, he is to call 911 and seek emergency care. He states understanding.  Will comply with recommendation: No- Barriers to comply with plan of care Patient wants to wait to schedule appointment due to insurance concerns and wants to wait and try home care rememdies for a little longer, if not better, will schedule..  If further questions/concerns or if symptoms do not improve, worsen or new symptoms develop, call your PCP or [facility] as soon as possible.      Guideline used:  Telephone Triage Protocols for Nurses, Fifth Edition, Holly Solis RN

## 2019-04-15 NOTE — TELEPHONE ENCOUNTER
"Requested Prescriptions   Pending Prescriptions Disp Refills     levothyroxine (SYNTHROID/LEVOTHROID) 75 MCG tablet [Pharmacy Med Name: LEVOTHYROXIN TAB 0.075MG] 90 tablet 0     Sig: TAKE 1 TABLET EVERY MORNING       Thyroid Protocol Passed - 4/15/2019  6:06 PM        Passed - Patient is 12 years or older        Passed - Recent (12 mo) or future (30 days) visit within the authorizing provider's specialty     Patient had office visit in the last 12 months or has a visit in the next 30 days with authorizing provider or within the authorizing provider's specialty.  See \"Patient Info\" tab in inbasket, or \"Choose Columns\" in Meds & Orders section of the refill encounter.              Passed - Medication is active on med list        Passed - Normal TSH on file in past 12 months     Recent Labs   Lab Test 12/24/18  1114   TSH 2.63              levothyroxine (SYNTHROID/LEVOTHROID) 75 MCG tablet  Last Written Prescription Date:  1/11/19  Last Fill Quantity: 90,  # refills: 0   Last office visit: 4/5/2019 with prescribing provider:  Dr. Lyles   Future Office Visit:   Next 5 appointments (look out 90 days)    May 29, 2019  1:20 PM CDT  [facility] Visit with Oneil Baxter MD  [facility] ([facility]) [address] [place] [id number]  [phone]   Jun 04, 2019  1:00 PM CDT  Return Visit with Elaine Segovia MD  [facility] ([facility]) [address] [place] [id number] [phone]           "

## 2019-04-16 ENCOUNTER — ALLIED HEALTH/NURSE VISIT (OUTPATIENT)
Dept: PHARMACY | Facility: CLINIC | Age: 44
End: 2019-04-16
Payer: COMMERCIAL

## 2019-04-16 DIAGNOSIS — R53.83 FATIGUE, UNSPECIFIED TYPE: ICD-10-CM

## 2019-04-16 DIAGNOSIS — M45.8 ANKYLOSING SPONDYLITIS OF SACRAL REGION (H): ICD-10-CM

## 2019-04-16 DIAGNOSIS — M79.669 CALF PAIN, UNSPECIFIED LATERALITY: ICD-10-CM

## 2019-04-16 DIAGNOSIS — F41.8 DEPRESSION WITH ANXIETY: Primary | ICD-10-CM

## 2019-04-16 DIAGNOSIS — E78.5 HYPERLIPIDEMIA LDL GOAL <100: ICD-10-CM

## 2019-04-16 DIAGNOSIS — R53.83 FATIGUE: ICD-10-CM

## 2019-04-16 PROCEDURE — 99607 MTMS BY PHARM ADDL 15 MIN: CPT | Performed by: PHARMACIST

## 2019-04-16 PROCEDURE — 99606 MTMS BY PHARM EST 15 MIN: CPT | Performed by: PHARMACIST

## 2019-04-16 RX ORDER — DULOXETIN HYDROCHLORIDE 60 MG/1
120 CAPSULE, DELAYED RELEASE ORAL DAILY
COMMUNITY

## 2019-04-16 RX ORDER — LEVOTHYROXINE SODIUM 75 UG/1
TABLET ORAL
Qty: 90 TABLET | Refills: 1 | Status: SHIPPED | OUTPATIENT
Start: 2019-04-16 | End: 2019-10-09

## 2019-04-16 RX ORDER — RISPERIDONE 0.25 MG/1
0.25 TABLET ORAL DAILY
COMMUNITY
End: 2019-05-08

## 2019-04-16 NOTE — PATIENT INSTRUCTIONS
Recommendations from today's MTM visit:                                                        1. Talk with Dr. Marino about possibly discontinuing aspirin    Next MTM visit: 8 weeks    To schedule another MTM appointment, please call the clinic directly or you may call the MTM scheduling line at [phone] or toll-free at [phone].     My Clinical Pharmacist's contact information:                                                      It was a pleasure talking with you today!  Please feel free to contact me with any questions or concerns you have.      Radha Mcdonald, Pharm.D, [facility]  Medication Therapy Management Pharmacist      You may receive a survey about the MTM services you received by email and/or US Mail.  I would appreciate your feedback to help me serve you better in the future. Your comments will be anonymous.

## 2019-04-16 NOTE — TELEPHONE ENCOUNTER
Prescription [place] per [facility] [place].      Martell Chaves RN, BSN

## 2019-04-16 NOTE — PROGRESS NOTES
SUBJECTIVE/OBJECTIVE:                           Joel Pineda is a 42 year old male seen in the infusion center for a follow visit for Medication Therapy Managemen for 2019.  He was referred to me from Ksenia Lyles.     Chief Complaint: Fatigue    Allergies/ADRs: Reviewed in Epic  Tobacco: No tobacco use  Alcohol: none  Caffeine: not assessed today  Activity: Not assessed today  PMH: Reviewed in Epic    Fatigue: patient is experiencing more fatigue. He is wondering is this is related to Humira or lyrica. This has been going on for many weeks. Saw therapist today and encouraged him to get out an enjoy the day.     Ankylosing Spondylitis/Pain: Current medications include Humira 40mg every 2 weeks.  Patient had first injection along with a nurse and injection went fine.      Mood/Anxiety: current therapy includes duloxetine 60mg daily for 3 days, lamictal 200mg daily, Xanax XR 1mg every day,  risperidone 0.25mg daily (patient is tapering off of this). Patient sees his therapist every week. Patient is seeing a new psychiatrist, Dr. Alegria with [facility] in [place] in February.      Hyperlipidemia: Current therapy includes rosuvastatin 40mg once daily, Lovaza 2gm in once daily a Pt reports no significant myalgias or other side effects.     Pain left calf: patient was started on ASA 81mg in the ED. Patient has been taking it twice daily. It was thought he had a flow problem. Upon follow-up with vascular they did not see anything abnormal.    ASSESSMENT:                             Current medications were reviewed today.     Medication Adherence: no issues identified    Fatigue: Needs improvement. The timing of the start of Humira does not correlate when patient started feeling fatigued. Patient has also been on lyrica for some time, unlikely that this could be the cause but could be contributing. Psychiatry med changes could be contributing as well as lapse in therapy for Ankylosing  spondylitis. Patient to continue to monitor and continue working with psychiatry and his therapist.    Ankylosing Spondylitis: Needs improvement. Patient just started on Humira. Too early to assess efficacy.     Mood/Anxiety: Stable.  Patient in close follow-up with psychiatry.    Hyperlipidemia: Stable.    Calf Pain: Needs improvement. Could consider discontinuing aspirin. Patient to discuss with Dr. Marino.    PLAN:                            Could consider discontinuing aspirin.    I spent 30 minutes with this patient today. All changes were made via collaborative practice agreement with Ksenia Lyles. A copy of the visit note was provided to the patient's primary care provider.    Will follow up in 8 weeks.    The patient was sent via [employer] a summary of these recommendations as an after visit summary.     Radha Mcdonald, Pharm.D, BCACP  Medication Therapy Management Pharmacist

## 2019-04-22 ENCOUNTER — TELEPHONE (OUTPATIENT)
Dept: RHEUMATOLOGY | Facility: CLINIC | Age: 44
End: 2019-04-22

## 2019-04-22 ENCOUNTER — NURSE TRIAGE (OUTPATIENT)
Dept: NURSING | Facility: CLINIC | Age: 44
End: 2019-04-22

## 2019-04-23 ENCOUNTER — OFFICE VISIT (OUTPATIENT)
Dept: PALLIATIVE MEDICINE | Facility: CLINIC | Age: 44
End: 2019-04-23
Payer: MEDICARE

## 2019-04-23 VITALS
BODY MASS INDEX: 39.61 KG/M2 | HEART RATE: 74 BPM | OXYGEN SATURATION: 98 % | SYSTOLIC BLOOD PRESSURE: 118 MMHG | WEIGHT: 315 LBS | DIASTOLIC BLOOD PRESSURE: 74 MMHG

## 2019-04-23 DIAGNOSIS — M53.3 SI (SACROILIAC) JOINT DYSFUNCTION: ICD-10-CM

## 2019-04-23 DIAGNOSIS — M54.16 LUMBAR RADICULOPATHY: Primary | ICD-10-CM

## 2019-04-23 DIAGNOSIS — M47.819 FACET ARTHROPATHY: ICD-10-CM

## 2019-04-23 DIAGNOSIS — M45.8 ANKYLOSING SPONDYLITIS OF SACRAL REGION (H): ICD-10-CM

## 2019-04-23 PROCEDURE — 99214 OFFICE O/P EST MOD 30 MIN: CPT | Performed by: PSYCHIATRY & NEUROLOGY

## 2019-04-23 ASSESSMENT — PAIN SCALES - GENERAL: PAINLEVEL: MILD PAIN (2)

## 2019-04-23 NOTE — PROGRESS NOTES
[facility] Interventional Evaluation    Date of visit: 2019    Reason for consultation:    Joel Pineda is a 43 year old male who is seen for interventional evaluation today at the request of his provider, Dr. Lyles.      I have previously seen Tito at the pain clinic, and have also done procedures with him.    Chief Complaint:    Chief Complaint   Patient presents with     Pain       Pain history:  Joel Pineda is a 43 year old male with various pain complaints, including neuropathy and back pain related to ankylosing spondylitis.    He has neuropathy symptoms since October.  They are in both feet, but worse on the left.  He has numbness in the foot, tingling.  He had EMG done and TNF was ruled out as a cause.  He went up on Lyrica, was treated for diabetes and has Hemoglobin 6.7 and he started treatment.  He was also started on Cymbalta.  His symptoms are now more intermittent.    He also has pain in the calf pain, more on the left.  He feels tightness, pain.  The calf pain is aggrevated with walking.  No n/t in the leg.  No weakness in the leg.  No b/b problems.    He has low back symptoms which has been treated somewhat by the SI joint radiofrequency ablation in the past.  He got 80% pain relief.  Last done 10/26/16  He has ankylosing spondolytis, and was told that   He has seen vascular provider who said he doesn't have vascular claudication.      He has been stretching in the back and shoulder, and this is good.  Massage can be helpful for muscular pain as well,  This fluctuates at times and he does self care to help with that.      Pain ratin/10  Any bowel or bladder incontinence: no    Current pain medications include:  Acetaminophen (tylenol)   celebrex 200mg BID  cymbalta 60mg/day  Oxycodone 5mg- up to 4 tabs/day  Tizanidine 4mg TID prn    Other treatments have included:  Joel Pineda has been seen at a pain clinic in the past.    PT:  completed    Past Medical History:  Past Medical History:   Diagnosis Date     Acne      Allergic state      Anxiety      Bipolar 2 disorder (H)      Chest pain     Chest pain, regulated w/BP meds. Clear arteries.     Chronic pain      Depressive disorder      Diabetes (H)      Diverticulosis      Gastroesophageal reflux disease      Hypertension      IBS (irritable bowel syndrome)      Intracranial arachnoid cyst      Polyneuropathy      Pulmonary embolism (H)      Skin exam, screening for cancer 12/3/2013     Sleep apnea      Uncomplicated asthma      Patient Active Problem List    Diagnosis Date Noted     History of pulmonary embolism 01/28/2019     Priority: Medium     Peripheral polyneuropathy 01/23/2019     Priority: Medium     Type 2 diabetes mellitus with complication, without long-term current use of insulin (H) 12/24/2018     Priority: Medium     DDD (degenerative disc disease), lumbar 11/13/2018     Priority: Medium     Morbid obesity due to hypertriglyceridemia (H) 05/17/2018     Priority: Medium     Fatty infiltration of liver 05/17/2018     Priority: Medium     Ankylosing spondylitis of sacral region (H) 02/28/2018     Priority: Medium     Chronic pain syndrome 04/04/2017     Priority: Medium     Patient is followed by Ksenia Lyles MD for ongoing prescription of pain medication.  All refills should only be approved by this provider, or covering partner.    Medication(s): oxy 5.   Maximum quantity per month: 40  Clinic visit frequency required: Q 6  months     Controlled substance agreement:  Encounter-Level CSA - 04/04/2017:          Controlled Substance Agreement - Scan on 4/11/2017  1:30 PM : CONTROLLED SUBSTANCE AGREEMENT (below)              Pain Clinic evaluation in the past: Yes    DIRE Total Score(s):  No flowsheet data found.    Last [facility] website verification:  02/25/19    [email or web address]        Essential hypertension with goal blood pressure less than 140/90 11/01/2016      Priority: Medium     B12 deficiency 08/29/2016     Priority: Medium     Irritable bowel syndrome with diarrhea 08/19/2016     Priority: Medium     Chronic midline low back pain without sciatica 06/06/2016     Priority: Medium     Bipolar 2 disorder (H) 12/28/2015     Priority: Medium     Acquired hypothyroidism 10/08/2015     Priority: Medium     Facet arthritis of cervical region (H) 10/06/2015     Priority: Medium     Intracranial arachnoid cyst 10/02/2015     Priority: Medium     LLOYD (obstructive sleep apnea)- mild (AHI 11) 01/31/2015     Priority: Medium     Study Date: 1/27/2015- (308.1 lbs)  Snoring was reported assoft to moderate.  Lowest oxygen saturation was 88.0%. Apnea/Hypopnea Index was 11.5 events per hour. REM was not seen.  The supine AHI is 12.7.  RDI was  25.7. PLM index was 0 per hour.  Sleep study 10/31/16 [facility] (292#) CPAP 8 cm effective       Anxiety 01/12/2015     Priority: Medium     GERD (gastroesophageal reflux disease)      Priority: Medium     Chronic nonallergic rhinitis      Priority: Medium     RAST all NEGATIVE for environmental allergens, IgE= 6 (normal)       Hyperlipidemia LDL goal <100      Priority: Medium     Major depressive disorder, recurrent episode (H)      Priority: Medium     Multiple psych providers - they manage meds  August 2015: Provigil induced severe mood dis-function       Intermittent asthma      Priority: Medium     Exercise-induced       Diverticulosis 09/01/2006     Priority: Medium     Recurrent diverticulitis, S/p sigmoid resction 8/2013         Past Surgical History:  Past Surgical History:   Procedure Laterality Date     BACK SURGERY  10/07    lumbar discectomy L5-S1     COLONOSCOPY      Note: colonoscopy scheduled with [facility] on Friday, 9/4/15     COSMETIC SURGERY  2012    Nose Exterior - functional     GI SURGERY  August 2013    Sigmoidectomy     HERNIA REPAIR, UMBILICAL  8/23/11    Dr. Evan whiting     INCISION AND DRAINAGE, ABSCESS, COMPLEX   8/23/11    umbilical, Dr. Evan Beavers     LAPAROSCOPIC ASSISTED COLECTOMY LEFT (DESCENDING)  8/15/2013    Procedure: LAPAROSCOPIC ASSISTED COLECTOMY LEFT (DESCENDING);  Laparoscopic Hand Assisted Sigmoid Resection, Mobilization of Splenic Fissure, coloproctoscopy, *Latex Free Room* Anesthesia General with Pain block  ;  Surgeon: Aurora Justice MD;  Location: [facility] OR     NERVE SURGERY  8/18/11    RF ablation @ L3-S1 @ [facility]     RECONSTRUCT NOSE AND SEPTUM (FUNCTIONAL)  10/14/2011    Procedure:RECONSTRUCT NOSE AND SEPTUM (FUNCTIONAL); Functional Septorhinoplasty, Turbinate Reduction, ; Surgeon:CEDRIC CUEVAS; Location:[facility] OR     SINUS SURGERY  10/1/01    ethmoidectomy chronic sinusitis     Medications:  Current Outpatient Medications   Medication Sig Dispense Refill     acetaminophen 500 MG CAPS Take 500 mg by mouth every 4 hours as needed 60 capsule      adalimumab (HUMIRA *CF*) 40 MG/0.4ML pen kit Inject 0.4 mLs (40 mg) Subcutaneous every 14 days . Hold for signs of infection, then seek medical attention. 0.8 mL 5     albuterol (PROAIR HFA/PROVENTIL HFA/VENTOLIN HFA) 108 (90 BASE) MCG/ACT Inhaler Inhale 2 puffs into the lungs every 4 hours as needed       ALPRAZolam (XANAX XR) 1 MG 24 hr tablet Take 1 mg by mouth daily       aspirin (ASA) 81 MG tablet Take 81 mg by mouth 2 times daily       blood glucose monitoring (NO BRAND SPECIFIED) meter device kit Use to test blood sugar 2 times daily or as directed. Include test strips (2 boxes) with 3 refills 1 kit 0     celecoxib (CELEBREX) 200 MG capsule TAKE 1 CAPSULE BY MOUTH TWICE DAILY AS NEEDED FOR MODERATE PAIN. 180 capsule 0     cetirizine (ZYRTEC) 10 MG tablet Take 1 tablet (10 mg) by mouth At Bedtime 30 tablet 11     cyanocobalamin (VITAMIN B12) 1000 MCG/ML injection Inject 1 mL (1,000 mcg) into the muscle every 30 days 10 mL 0     DULoxetine (CYMBALTA) 60 MG capsule Take 60 mg by mouth daily       EPINEPHrine (EPIPEN/ADRENACLICK/OR ANY BX GENERIC EQUIV) 0.3  MG/0.3ML injection 2-pack Inject 0.3 mLs (0.3 mg) into the muscle once as needed for anaphylaxis 0.6 mL 3     fluticasone-salmeterol (ADVAIR DISKUS) 250-50 MCG/DOSE diskus inhaler Inhale 1 puff into the lungs 2 times daily        Ginger, Zingiber officinalis, (GINGER ROOT) 550 MG CAPS capsule Take 550 mg by mouth Up to three times daily       hydrOXYzine (ATARAX) 50 MG tablet Take  mg by mouth as needed For anxiety and insomnia       lamoTRIgine (LAMICTAL) 100 MG tablet Take 200 mg by mouth daily       levothyroxine (SYNTHROID/LEVOTHROID) 75 MCG tablet TAKE 1 TABLET EVERY MORNING 90 tablet 1     lidocaine (XYLOCAINE) 5 % external ointment UAD 30 g      loperamide (IMODIUM) 2 MG capsule Take 2 mg by mouth 4 times daily as needed for diarrhea       metFORMIN (GLUCOPHAGE-XR) 500 MG 24 hr tablet Take 2 tablets (1,000 mg) by mouth daily (with dinner) 180 tablet 3     metoprolol succinate ER (TOPROL-XL) 200 MG 24 hr tablet Take 2 tablets (400 mg) by mouth daily 180 tablet 1     montelukast (SINGULAIR) 10 MG tablet Take 10 mg by mouth daily        omega-3 acid ethyl esters (LOVAZA) 1 g capsule TAKE 2 CAPSULES BY MOUTH TWICE DAILY. 360 capsule 1     omeprazole 20 MG tablet Take 20 mg by mouth daily        ondansetron (ZOFRAN) 8 MG tablet TAKE 1 TABLET BY MOUTH EVERY 8 HOURS AS NEEDED FOR NAUSEA OR VOMITING 20 tablet 4     order for [facility] Respironics REMSTAR 60 Series Auto CPAP 9-13 cm H2O, Wisp nasal mask w/a large cushion and a chinstrap       oxyCODONE (ROXICODONE) 5 MG tablet Take 1-2 tablets (5-10 mg) by mouth every 6 hours as needed for pain (maximum 4 tablet(s) per day) 35 tablet 0     pregabalin (LYRICA) 150 MG capsule Take 1 capsule (150 mg) by mouth 2 times daily 60 capsule 11     pseudoePHEDrine (SUDAFED) 120 MG 12 hr tablet Take 1 tablet (120 mg) by mouth every 12 hours 28 tablet 0     ramipril (ALTACE) 10 MG capsule Take 1 capsule (10 mg) by mouth daily 90 capsule 1     risperiDONE (RISPERDAL) 0.25 MG tablet  Take 0.25 mg by mouth daily       rizatriptan (MAXALT-MLT) 5 MG ODT tab Take 1 tablet (5 mg) by mouth at onset of headache for migraine 30 tablet 5     rosuvastatin (CRESTOR) 40 MG tablet Take 1 tablet (40 mg) by mouth daily 90 tablet 2     syringe, disposable, (BD TUBERCULIN SYRINGE) 1 ML MISC Equipment being ordered: 1 ml tuberculin syringes to be used for Vitamin B12 injections. 12 each 1     tiZANidine (ZANAFLEX) 4 MG tablet TAKE 1 TABLET BY MOUTH THREE TIMES DAILY AS NEEDED 90 tablet 4     vitamin C (ASCORBIC ACID) 500 MG tablet Take 500 mg by mouth daily       vitamin D3 (CHOLECALCIFEROL) 61923 UNITS capsule Take one capsule every two weeks. 24 capsule 1     Allergies:     Allergies   Allergen Reactions     Banana Shortness Of Breath     Pt reports organic Banana is okay.      Nitroglycerin Palpitations     Penicillins Anaphylaxis     Provigil [Modafinil] Shortness Of Breath     headache     Ciprofloxacin Palpitations, Other (See Comments) and Rash     dizziness (versus metronidazole taken at same time)     Gadolinium Hives and Itching     Patient was premedicated for the contrast allergy. He did still have a reaction a few hours after injection. Hives and itching. Dr. Gomez told tech to inform pt he should only have contrast again in the future when premedicated and at a hospital. Not at an outpatient facility.      Dye [Contrast Dye] Other (See Comments) and Hives     Moderate flushing, CT contrast     Golimumab      Hives, bradycardia, face swelling     Neurontin [Gabapentin] Hives     Moderate hives     Nortriptyline Hives     Varicella Virus Vaccine Live      Rash     Ciprofloxacin Palpitations     Flagyl [Metronidazole Hcl] Palpitations and Hives     Latex Rash     Metronidazole Palpitations, Other (See Comments) and Rash     dizziness (versus ciprofloxacin taken at same time)     No Clinical Screening - See Comments Rash     Nitrile gloves     Social History:  Social History     Socioeconomic  History     Marital status: Single     Spouse name: Not on file     Number of children: Not on file     Years of education: Not on file     Highest education level: Not on file   Occupational History     Occupation:      Employer: [employer]     Occupation: paraprofessional     Comment: [employer]     Employer: [employer]   Social Needs     Financial resource strain: Not on file     Food insecurity:     Worry: Not on file     Inability: Not on file     Transportation needs:     Medical: Not on file     Non-medical: Not on file   Tobacco Use     Smoking status: Never Smoker     Smokeless tobacco: Never Used   Substance and Sexual Activity     Alcohol use: Yes     Alcohol/week: 0.0 oz     Drinks per session: 1 or 2     Binge frequency: Weekly     Comment: occ 1/week     Drug use: No     Sexual activity: Never     Partners: Female   Lifestyle     Physical activity:     Days per week: Not on file     Minutes per session: Not on file     Stress: Not on file   Relationships     Social connections:     Talks on phone: Not on file     Gets together: Not on file     Attends Taoist service: Not on file     Active member of club or organization: Not on file     Attends meetings of clubs or organizations: Not on file     Relationship status: Not on file     Intimate partner violence:     Fear of current or ex partner: Not on file     Emotionally abused: Not on file     Physically abused: Not on file     Forced sexual activity: Not on file   Other Topics Concern     Parent/sibling w/ CABG, MI or angioplasty before 65F 55M? No      Service Not Asked     Blood Transfusions Not Asked     Caffeine Concern Not Asked     Occupational Exposure Not Asked     Hobby Hazards Not Asked     Sleep Concern Yes     Stress Concern Yes     Weight Concern Not Asked     Special Diet Not Asked     Back Care Yes     Exercise Not Asked     Bike Helmet Not Asked     Seat Belt Yes     Self-Exams Not Asked   Social History  "Narrative     Not on file        Review of Systems:    See scanned in packet for ROS       Physical Exam:  Vitals:    04/23/19 1539   BP: 118/74   Pulse: 74   SpO2: 98%   Weight: (!) 151.5 kg (334 lb)     Exam:  Constitutional: healthy, alert and no distress  Head: normocephalic. Atraumatic.   Eyes: no redness or jaundice noted   ENT: oropharnx normal.  MMM.  Neck supple.    Skin: no suspicious lesions or rashes  Psychiatric: mentation appears normal and affect normal/bright    Musculoskeletal exam:  Gait/Station/Posture: normal  Cervical spine: no tenderness    Flex:  45 degrees   Ext: 45 degrees   Rotation to right: 45 degrees   Rotation to left: 45 degrees       Thoracic spine:  Normal     Lumbar spine: myofascial tenderness    Flex:  15 degrees   Ext: <10 degrees   Rotation/ext to right: painful    Rotation/ext to left: painful     Myofascial tenderness:  SI joint tenderness .  No tenderness along left calf  Straight leg exam: positive    Neurologic exam:  CN:  Cranial nerves 2-12 are normal  Motor:  5/5 UE and LE strength    Sensory:  (upper and lower extremities):   Light touch: normal    Allodynia: absent    Dysethesia: absent    Hyperalgesia: absent     Diagnostic tests:  MRI of lumbar spine was completed on 12/7/16 showing:  \"Impression: No significant change since 4/14/2015. L5-S1 disc bulge  with superimposed left subarticular disc extrusion abuts and possibly  impinges the descending left S1 nerve roots.  Moderate to advanced  right and moderate left neural foraminal stenosis at L5-S1. No  significant central canal stenosis.   \"    Personally reviewed imaging       Assessment:  1. Cervicalgia   2. Ankylosing spondylitis with SI joint dysfunction and facet arthropathy - h/o previous RFAs at both locations  3. Lumbar radiculopathy   4. Anxiety  5. Depression      Plan:    1. Interventional recommendations:   1. recommend transforaminal epidural steroid injection on left - likely L5/S1  2. With change of " insurance, not likely to get sacral radiofrequency ablation despite improvement with previous - will look into it.      Total time spent was 30 minutes, and more than 50% of face to face time was spent in counseling and/or coordination of care regarding principles of multidisciplinary care, insurance changes, and discussion of procedures.      Ericka Diaz MD

## 2019-04-23 NOTE — TELEPHONE ENCOUNTER
"Clinic Action Needed: yes callback  [facility]NA Triage Call  Presenting Problem:  Joel calling \"I have an autoimmune disorder. Recently switched to the medication Humira. I don't know if this is what is causing my debilitating fatigue but I'm so zapped, I don't know what to do to get energy back. I have an appointment with Dr. Oneil Davison, but its not until the end of May.\"  Pt is wondering if he can speak with Dr Oneil Davison about his fatigue.     Guideline Used: Fatigue    Patient Recommendations/Teaching: See PCP within 3 days    Routed to: [facility] RN Pool    Please be sure to close this encounter once this patient's issue/question has been addressed.    Luz Vilchis RN/[facility]          "

## 2019-04-23 NOTE — TELEPHONE ENCOUNTER
Rheumatology team: Humira was prescribed in February 2019.  If Mr. Pineda associated more fatigue with Humira then I recommend holding the Humira to see if it is related.  It will need to be held for 3-4 weeks.  I also recommend that he follow-up with his primary care provider for evaluation of fatigue.    Oneil Baxter MD  4/23/2019 3:38 PM

## 2019-04-23 NOTE — PATIENT INSTRUCTIONS
1. Schedule epidural steroid injection [phone]  2. I will look into options for SI joint denervation  ----------------------------------------------------------------  Clinic Number:  [phone]   Call this number with any questions about your care and for scheduling assistance. Calls are returned Monday through Friday between 8 AM and 4:30 PM. We usually get back to you within 2 business days depending on the issue/request.       Medication refills:    For non-opioid medications, call your pharmacy directly to request a refill. The pharmacy will contact the [facility] for authorization. Please allow 3-4 days for these refills to be processed.     For opioid refills, call the clinic number or send a [facility] message. Please contact us 7-10 days before your refill is due. The message MUST include the name of the specific medication(s) requested and how you would like to receive the prescription(s). The options are as follows:    Pain Clinic staff can mail the prescription to your pharmacy. Please tell us the name of the pharmacy.    You may pick the prescription up at the Pain Clinic (tell us the location) or during a clinic visit with your pain provider    Pain Clinic staff can deliver the prescription to the [facility] in the clinic building. Please tell us the location.      We believe regular attendance is key to your success in our program.    Any time you are unable to keep your appointment we ask that you call us at least 24 hours in advance to let us know. This will allow us to offer the appointment time to another patient.

## 2019-04-23 NOTE — TELEPHONE ENCOUNTER
"Joel calling \"I have an autoimmune disorder. Recently switched to the medication Humira. I don't know if this is what is causing my debilitating fatigue but I'm so zapped, I don't know what to do to get energy back. I have an appointment with Dr. Oneil Davison, but its not until the end of May.\"      RN will send message to [facility], Rheumatology Dr Oneil Davison to follow up with patient about his fatigue.     Caller verbalized understanding and had no further questions.     Luz Vilchis RN/[facility]    Reason for Disposition    [1] Fatigue (i.e., tires easily, decreased energy) AND [2] persists > 1 week    Additional Information    Negative: Severe difficulty breathing (e.g., struggling for each breath, speaks in single words)    Negative: Shock suspected (e.g., cold/pale/clammy skin, too weak to stand, low BP, rapid pulse)    Negative: Difficult to awaken or acting confused  (e.g., disoriented, slurred speech)    Negative: [1] Fainted > 15 minutes ago AND [2] still feels too weak or dizzy to stand    Negative: [1] SEVERE weakness (i.e., unable to walk or barely able to walk, requires support) AND     [2] new onset or worsening    Negative: Sounds like a life-threatening emergency to the triager    Negative: Difficulty breathing    Negative: Heart beating < 50 beats per minute OR > 140 beats per minute    Negative: Extra heart beats OR irregular heart beating   (i.e., \"palpitations\")    Negative: Follows bleeding (e.g., from vomiting, rectum, vagina; EXCEPTION: small brief weakness from sight of a small amount blood)    Negative: Black or tarry bowel movements    Negative: [1] Drinking very little AND [2] dehydration suspected (e.g., no urine > 12 hours, very dry mouth, very lightheaded)    Negative: Patient sounds very sick or weak to the triager    Negative: [1] MODERATE weakness (i.e., interferes with work, school, normal activities) AND [2] cause unknown  (Exceptions: " weakness with acute minor illness, or weakness from poor fluid intake)    Negative: [1] MODERATE weakness AND [2] from poor fluid intake AND [3] no improvement after 2 hours of rest and fluids    Negative: [1] Fever > 103 F (39.4 C) AND [2] not able to get the fever down using Fever Care Advice    Negative: [1] Fever > 101 F (38.3 C) AND [2] age > 60    Negative: [1] Fever > 101 F (38.3 C) AND [2] bedridden (e.g., nursing home patient, CVA, chronic illness, recovering from surgery)    Negative: [1] Fever > 100.5 F (38.1 C) AND [2] diabetes mellitus or weak immune system (e.g., HIV positive, cancer chemo, splenectomy, organ transplant, chronic steroids)    Negative: Pale skin (pallor)    Protocols used: WEAKNESS (GENERALIZED) AND FATIGUE-ADULT-

## 2019-04-24 ENCOUNTER — TELEPHONE (OUTPATIENT)
Dept: PALLIATIVE MEDICINE | Facility: CLINIC | Age: 44
End: 2019-04-24

## 2019-04-24 NOTE — TELEPHONE ENCOUNTER
Patient called reporting there has been a misunderstanding. He is wondering if he can increase the frequency of Humira from every 2 weeks to every week due to increased fatigue he has been experiencing from his AS for the past 4-5 weeks. Routed to Dr. Baxter.    Neal Amanda RN....4/24/2019 2:09 PM

## 2019-04-24 NOTE — TELEPHONE ENCOUNTER
Pre-screening Questions for Radiology Injections:    Injection to be done at which interventional clinic site? [facility] [place]    Instruct patient to arrive as directed prior to the scheduled appointment time:    [place] minutes before      [place]: 30 minutes before; if IV needed 1 hour before     Procedure ordered by Dr. Diaz    Procedure ordered? Lumbar Epidural Steroid Injection    What insurance would patient like us to bill for this procedure? Medicare, [employer]      Worker's comp or MVA (motor vehicle accident) -Any injection DO NOT SCHEDULE and route to Lorraine Chavez.      [employer] insurance - For SI joint injections, DO NOT SCHEDULE and route Lorraine Chavez.       [employer] - Any injection besides hip/shoulder/knee joint DO NOT SCHEDULE and route to Lorraine Chavez. She will obtain PA and call pt back to schedule procedure or notify pt of denial.        [employer]-Route to Lorraine for review        IF SCHEDULING IN [place] AND NEEDS A PA, IT IS OKAY TO SCHEDULE. [place] HANDLES THEIR OWN PA'S AFTER THE PATIENT IS SCHEDULED. PLEASE SCHEDULE AT LEAST 1 WEEK OUT SO A PA CAN BE OBTAINED.      Any chance of pregnancy? Not Applicable   If YES, do NOT schedule and route to RN pool    Is an  needed? No     Patient has a drive home? (mandatory) YES: INFORMED    Is patient taking any blood thinners (plavix, coumadin, jantoven, warfarin, heparin, pradaxa or dabigatran )? No   If hold needed, do NOT schedule, route to RN pool     Is patient taking any aspirin products (includes Excedrin and Fiorinal)? No     If more than 325mg/day do NOT schedule; route to RN pool     For CERVICAL procedures, hold all aspirin products for 6 days.     Tell pt that if aspirin product is not held for 6 days, the procedure WILL BE cancelled.      Does the patient have a bleeding or clotting disorder? No     If YES, okay to schedule AND route to RN nurse pool    For any patients with platelet count <100,  must be forwarded to provider    Is patient diabetic?  Yes  If YES, have them bring their glucometer.    Does patient have an active infection or treated for one within the past week? No     Is patient currently taking any antibiotics?  No     For patients on chronic, preventative, or prophylactic antibiotics, procedures may be scheduled.     For patients on antibiotics for active or recent infection:antibiotic course must have been completed for 4 days    Is patient currently taking any steroid medications? (i.e. Prednisone, Medrol)  No     For patients on steroid medications, course must have been completed for 4 days    Reviewed with patient:  If you are started on any steroids or antibiotics between now and your appointment, you must contact us because the procedure may need to be cancelled.  Yes    Is patient actively being treated for cancer or immunocompromised? No  If YES, do NOT schedule and route to RN pool     Are you able to get on and off an exam table with minimal or no assistance? Yes  If NO, do NOT schedule and route to RN pool    Are you able to roll over and lay on your stomach with minimal or no assistance? Yes  If NO, do NOT schedule and route to RN pool     Any allergies to contrast dye, iodine, shellfish, or numbing and steroid medications? Yes - Contrast Dye  If YES, route to RN pool AND add allergy information to appointment notes    Allergies: Banana; Nitroglycerin; Penicillins; Provigil [modafinil]; Ciprofloxacin; Gadolinium; Dye [contrast dye]; Golimumab; Neurontin [gabapentin]; Nortriptyline; Varicella virus vaccine live; Ciprofloxacin; Flagyl [metronidazole hcl]; Latex; Metronidazole; and No clinical screening - see comments      Has the patient had a flu shot or any other vaccinations within 7 days before or after the procedure.  No     Does patient have an MRI/CT?  YES: MRI  (SI joint, hip injections, lumbar sympathetic blocks, and stellate ganglion blocks do not require an MRI)    Was  the MRI done w/in the last 3 years?  Yes    Was MRI done at [facility]? Yes      If not, where was it done? N/A       If MRI was not done at [facility], [facility] or [facility] do NOT schedule and route to nursing.  If pt has an imaging disc, the injection may be scheduled but pt has to bring disc to appt. If they show up w/out disc the injection cannot be done    Reminders (please tell patient if applicable):       Instructed pt to arrive 30 minutes early for IV start if this is for a cervical procedure, ALL sympathetic (stellate ganglion, hypogastric, or lumbar sympathetic block) and all sedation procedures (RFA, spinal cord stimulation trials).  Not Applicable   -IVs are not routinely placed for Dr. Capps cervical cases   -Dr. Montgomery: IVs for cervical ESIs and cervical TBDs (not CMBBs/facet inj)      If NPO for sedation, informed patient that it is okay to take medications with sips of water (except if they are to hold blood thinners).  Not Applicable   *DO take blood pressure medication if it is prescribed*      If this is for a cervical SAMINA, informed patient that aspirin needs to be held for 6 days.   Not Applicable      For all patients not having spinal cord stimulator (SCS) trials or radiofrequency ablations (RFAs), informed patient:    IV sedation is not provided for this procedure.  If you feel that an oral anti-anxiety medication is needed, you can discuss this further with your referring provider or primary care provider.  The Pain Clinic provider will discuss specifics of what the procedure includes at your appointment.  Most procedures last 10-20 minutes.  We use numbing medications to help with any discomfort during the procedure.  Not Applicable      Do not schedule procedures requiring IV placement in the first appointment of the day or first appointment after lunch. Do NOT schedule at 0745, 0815 or 1245. N/A      For patients 85 or older we recommend having an adult stay w/ them for the remainder of  the day.   N/A    Does the patient have any questions?  NO  Farida Avalos  [facility]

## 2019-04-24 NOTE — TELEPHONE ENCOUNTER
Attempted to contact Pt, l/m in detail re: Humira was prescribed in February 2019.  If Mr. Pineda associated more fatigue with Humira then Dr. Baxter recommends holding the Humira to see if it is related.  It will need to be held for 3-4 weeks. Dr. Baxter also recommends that he follow-up with his primary care provider for evaluation of fatigue. Having Pt return call to clinic @ [phone] should he have any       Yoli Han CMA  4/24/2019  12:22 PM

## 2019-04-25 NOTE — TELEPHONE ENCOUNTER
Scheduled appropriately.    Will close encounter at this time and follow up as needed.     Ovi Herzog, RN  Care Coordinator   [facility]

## 2019-04-27 ENCOUNTER — NURSE TRIAGE (OUTPATIENT)
Dept: NURSING | Facility: CLINIC | Age: 44
End: 2019-04-27

## 2019-04-27 ENCOUNTER — OFFICE VISIT (OUTPATIENT)
Dept: URGENT CARE | Facility: URGENT CARE | Age: 44
End: 2019-04-27
Payer: MEDICARE

## 2019-04-27 VITALS
OXYGEN SATURATION: 96 % | RESPIRATION RATE: 16 BRPM | TEMPERATURE: 98.3 F | WEIGHT: 315 LBS | DIASTOLIC BLOOD PRESSURE: 84 MMHG | BODY MASS INDEX: 39.37 KG/M2 | HEART RATE: 72 BPM | SYSTOLIC BLOOD PRESSURE: 138 MMHG

## 2019-04-27 DIAGNOSIS — H66.001 ACUTE SUPPURATIVE OTITIS MEDIA OF RIGHT EAR WITHOUT SPONTANEOUS RUPTURE OF TYMPANIC MEMBRANE, RECURRENCE NOT SPECIFIED: Primary | ICD-10-CM

## 2019-04-27 DIAGNOSIS — H60.391 INFECTIVE OTITIS EXTERNA, RIGHT: ICD-10-CM

## 2019-04-27 PROCEDURE — 99213 OFFICE O/P EST LOW 20 MIN: CPT | Performed by: PHYSICIAN ASSISTANT

## 2019-04-27 RX ORDER — POLYMYXIN B SULFATE AND TRIMETHOPRIM 1; 10000 MG/ML; [USP'U]/ML
SOLUTION OPHTHALMIC
Qty: 1 BOTTLE | Refills: 0 | Status: SHIPPED | OUTPATIENT
Start: 2019-04-27 | End: 2019-05-08

## 2019-04-27 RX ORDER — SULFAMETHOXAZOLE/TRIMETHOPRIM 800-160 MG
1 TABLET ORAL 2 TIMES DAILY
Qty: 14 TABLET | Refills: 0 | Status: SHIPPED | OUTPATIENT
Start: 2019-04-27 | End: 2019-05-08

## 2019-04-27 ASSESSMENT — PAIN SCALES - GENERAL: PAINLEVEL: MILD PAIN (3)

## 2019-04-27 NOTE — TELEPHONE ENCOUNTER
FNA triage call :   Presenting problem :  Pt called. 10 days R ear hearing and tender to touch ,  Hx of intermittent ringing in R  Ear and that occurred again in the last 10 days.  Currently : no fever or dizziness ,but fullness.  Currently : active and 1&0 is ok.   Guideline used : Hearing Loss or change - adult   Disposition and recommendations : see provider in 72 hours , avoid loud noise and call back if earache or worse and Pt unsure.   Caller verbalizes understanding and denies further questions and will call back if further symptoms to triage or questions  . Ewa Rivero RN  - [place] Nurse Advisor     Reason for Disposition    Recurrent episodes of hearing loss, dizziness, and ringing in the ear    Additional Information    Negative: Followed an ear injury    Negative: Decreased hearing with nasal allergies    Negative: Part of a cold    Negative: Followed air travel or mountain driving    Negative: See something in ear canal    Negative: Earwax, questions about    Negative: Dizziness is main symptom    Negative: Tinnitus is main symptom    Negative: [1] Ringing in the ears (tinnitus) AND [2] taking aspirin AND [3] dosage sounds high (i.e., > 1500 mg/day)    Negative: Patient sounds very sick or weak to the triager    Negative: [1] Hearing loss in one or both ears AND [2] sudden onset AND [3] present now    Negative: [1] SEVERE ear pain AND [2] not improved 2 hours after ibuprofen    Negative: Earache    Negative: Decreased hearing followed sudden, extremely loud noise (e.g., explosion, not just loud concert)    Negative: [1] Decreased hearing AND [2] taking medication that can damage hearing (i.e., gentamycin, tobramycin, furosemide, ethacrynic acid, cisplatin, quinidine)    Negative: [1] Decreased hearing in 1 ear AND [2] gradual onset    Protocols used: HEARING LOSS OR CHANGE-ADULT-

## 2019-04-27 NOTE — PROGRESS NOTES
S: 43-year-old male presents for evaluation of right ear pain with decreased hearing and drainage for 10 days.  Does have year-round allergies for which she takes steroids daily Zyrtec.  No fever.  No significant cough.  No postnasal drip.          Allergies   Allergen Reactions     Amoxicillin-Pot Clavulanate Difficulty breathing     Banana Shortness Of Breath     Pt reports organic Banana is okay.      Nitroglycerin Palpitations     Penicillins Anaphylaxis     Provigil [Modafinil] Shortness Of Breath     headache     Ciprofloxacin Palpitations, Other (See Comments) and Rash     dizziness (versus metronidazole taken at same time)     Gadolinium Hives and Itching     Patient was premedicated for the contrast allergy. He did still have a reaction a few hours after injection. Hives and itching. Dr. Gomez told tech to inform pt he should only have contrast again in the future when premedicated and at a hospital. Not at an outpatient facility.      Dye [Contrast Dye] Other (See Comments) and Hives     Moderate flushing, CT contrast     Golimumab      Hives, bradycardia, face swelling     Neurontin [Gabapentin] Hives     Moderate hives     Nortriptyline Hives     Varicella Virus Vaccine Live      Rash     Ciprofloxacin Palpitations     Flagyl [Metronidazole Hcl] Palpitations and Hives     Latex Rash     Metronidazole Palpitations, Other (See Comments) and Rash     dizziness (versus ciprofloxacin taken at same time)     No Clinical Screening - See Comments Rash     Nitrile gloves       Past Medical History:   Diagnosis Date     Acne      Allergic state      Anxiety      Bipolar 2 disorder (H)      Chest pain     Chest pain, regulated w/BP meds. Clear arteries.     Chronic pain      Depressive disorder      Diabetes (H)      Diverticulosis      Gastroesophageal reflux disease      Hypertension      IBS (irritable bowel syndrome)      Intracranial arachnoid cyst      Polyneuropathy      Pulmonary embolism (H)       Skin exam, screening for cancer 12/3/2013     Sleep apnea      Uncomplicated asthma          Current Outpatient Medications on File Prior to Visit:  acetaminophen 500 MG CAPS Take 500 mg by mouth every 4 hours as needed   adalimumab (HUMIRA *CF*) 40 MG/0.4ML pen kit Inject 0.4 mLs (40 mg) Subcutaneous every 14 days . Hold for signs of infection, then seek medical attention.   albuterol (PROAIR HFA/PROVENTIL HFA/VENTOLIN HFA) 108 (90 BASE) MCG/ACT Inhaler Inhale 2 puffs into the lungs every 4 hours as needed   ALPRAZolam (XANAX XR) 1 MG 24 hr tablet Take 1 mg by mouth daily   aspirin (ASA) 81 MG tablet Take 81 mg by mouth 2 times daily   celecoxib (CELEBREX) 200 MG capsule TAKE 1 CAPSULE BY MOUTH TWICE DAILY AS NEEDED FOR MODERATE PAIN.   cetirizine (ZYRTEC) 10 MG tablet Take 1 tablet (10 mg) by mouth At Bedtime   cyanocobalamin (VITAMIN B12) 1000 MCG/ML injection Inject 1 mL (1,000 mcg) into the muscle every 30 days   DULoxetine (CYMBALTA) 60 MG capsule Take 60 mg by mouth daily   EPINEPHrine (EPIPEN/ADRENACLICK/OR ANY BX GENERIC EQUIV) 0.3 MG/0.3ML injection 2-pack Inject 0.3 mLs (0.3 mg) into the muscle once as needed for anaphylaxis   fluticasone-salmeterol (ADVAIR DISKUS) 250-50 MCG/DOSE diskus inhaler Inhale 1 puff into the lungs 2 times daily    Ginger, Zingiber officinalis, (GINGER ROOT) 550 MG CAPS capsule Take 550 mg by mouth Up to three times daily   hydrOXYzine (ATARAX) 50 MG tablet Take  mg by mouth as needed For anxiety and insomnia   lamoTRIgine (LAMICTAL) 100 MG tablet Take 200 mg by mouth daily   levothyroxine (SYNTHROID/LEVOTHROID) 75 MCG tablet TAKE 1 TABLET EVERY MORNING   lidocaine (XYLOCAINE) 5 % external ointment UAD   loperamide (IMODIUM) 2 MG capsule Take 2 mg by mouth 4 times daily as needed for diarrhea   metFORMIN (GLUCOPHAGE-XR) 500 MG 24 hr tablet Take 2 tablets (1,000 mg) by mouth daily (with dinner)   metoprolol succinate ER (TOPROL-XL) 200 MG 24 hr tablet Take 2 tablets (400 mg)  by mouth daily   montelukast (SINGULAIR) 10 MG tablet Take 10 mg by mouth daily    omega-3 acid ethyl esters (LOVAZA) 1 g capsule TAKE 2 CAPSULES BY MOUTH TWICE DAILY.   omeprazole 20 MG tablet Take 20 mg by mouth daily    ondansetron (ZOFRAN) 8 MG tablet TAKE 1 TABLET BY MOUTH EVERY 8 HOURS AS NEEDED FOR NAUSEA OR VOMITING   oxyCODONE (ROXICODONE) 5 MG tablet Take 1-2 tablets (5-10 mg) by mouth every 6 hours as needed for pain (maximum 4 tablet(s) per day)   pregabalin (LYRICA) 150 MG capsule Take 1 capsule (150 mg) by mouth 2 times daily   pseudoePHEDrine (SUDAFED) 120 MG 12 hr tablet Take 1 tablet (120 mg) by mouth every 12 hours   ramipril (ALTACE) 10 MG capsule Take 1 capsule (10 mg) by mouth daily   risperiDONE (RISPERDAL) 0.25 MG tablet Take 0.25 mg by mouth daily   rizatriptan (MAXALT-MLT) 5 MG ODT tab Take 1 tablet (5 mg) by mouth at onset of headache for migraine   rosuvastatin (CRESTOR) 40 MG tablet Take 1 tablet (40 mg) by mouth daily   tiZANidine (ZANAFLEX) 4 MG tablet TAKE 1 TABLET BY MOUTH THREE TIMES DAILY AS NEEDED   vitamin C (ASCORBIC ACID) 500 MG tablet Take 500 mg by mouth daily   vitamin D3 (CHOLECALCIFEROL) 36203 UNITS capsule Take one capsule every two weeks.   blood glucose monitoring (NO BRAND SPECIFIED) meter device kit Use to test blood sugar 2 times daily or as directed. Include test strips (2 boxes) with 3 refills   order for [facility] Respironics REMSTAR 60 Series Auto CPAP 9-13 cm H2O, Wisp nasal mask w/a large cushion and a chinstrap   syringe, disposable, (BD TUBERCULIN SYRINGE) 1 ML MISC Equipment being ordered: 1 ml tuberculin syringes to be used for Vitamin B12 injections.     No current facility-administered medications on file prior to visit.     Social History     Tobacco Use     Smoking status: Never Smoker     Smokeless tobacco: Never Used   Substance Use Topics     Alcohol use: Yes     Alcohol/week: 0.0 oz     Drinks per session: 1 or 2     Binge frequency: Weekly     Comment:  occ 1/week       ROS:  CONSTITUTIONAL: Negative for fatigue or fever.  EYES: Negative for eye problems.  ENT: As above.  RESP: As above.  CV: Negative for chest pains.  GI: Negative for vomiting.  MUSCULOSKELETAL:  Negative for significant muscle or joint pains.  NEUROLOGIC: Negative for headaches.  SKIN: Negative for rash.    OBJECTIVE:  /84 (BP Location: Left arm, Patient Position: Sitting, Cuff Size: Adult Large)   Pulse 72   Temp 98.3  F (36.8  C) (Oral)   Resp 16   Wt (!) 150.6 kg (332 lb)   SpO2 96%   BMI 39.37 kg/m    GENERAL APPEARANCE: Healthy, alert and no distress.  EYES:Conjunctiva/sclera clear.  EARS: No cerumen.   Left  ear canal is clear.  Right  ear canal is red and inflamed.  Left  TM is clear.  Right TM is pink dull and retracted.   Able to hear me rubbing my fingers together outside of his right  ear.  NOSE/MOUTH: Nose without ulcers, erythema or lesions.  SINUSES: No maxillary sinus tenderness.  THROAT: No erythema w/o tonsillar enlargement . No exudates.  NECK: Supple, nontender, no lymphadenopathy.  RESP: Lungs clear to auscultation - no rales, rhonchi or wheezes  CV: Regular rate and rhythm, normal S1 S2, no murmur noted.  NEURO: Awake, alert    SKIN: No rashes        ASSESSMENT:     ICD-10-CM    1. Acute suppurative otitis media of right ear without spontaneous rupture of tympanic membrane, recurrence not specified H66.001 sulfamethoxazole-trimethoprim (BACTRIM DS) 800-160 MG tablet   2. Infective otitis externa, right H60.391 trimethoprim-polymyxin b (POLYTRIM) 74766-3.1 UNIT/ML-% ophthalmic solution           PLAN: Difficult to prescribe antibiotics with his multiple allergies.  Will try oral Bactrim and Polytrim eyedrops in the ear.  Lots of rest and fluids.  RTC if any worsening symptoms or if not improving.    Antonieta Mccormack PA-C

## 2019-05-01 ENCOUNTER — TELEPHONE (OUTPATIENT)
Dept: FAMILY MEDICINE | Facility: CLINIC | Age: 44
End: 2019-05-01

## 2019-05-01 ENCOUNTER — NURSE TRIAGE (OUTPATIENT)
Dept: NURSING | Facility: CLINIC | Age: 44
End: 2019-05-01

## 2019-05-01 NOTE — TELEPHONE ENCOUNTER
Per OV documentation at  visit on 4/27/19, patient was to RTC for OV if symptoms not improving:     PLAN: Difficult to prescribe antibiotics with his multiple allergies.  Will try oral Bactrim and Polytrim eyedrops in the ear.  Lots of rest and fluids.  RTC if any worsening symptoms or if not improving.     Antonieta Mccormack PA-C     Team, please advise patient to go to Urgent Care or schedule an OV with a FP provider for reevaluation of symptoms.     Yuliana Solis RN, BSN, PHN

## 2019-05-01 NOTE — TELEPHONE ENCOUNTER
Reason for Call:  Other call back    Detailed comments: Was seen in urgent care on 04/27 for ear infection. Is having increased ear pain. Wondering if he needs to come back in or wait it out. Please call to advise. Thank you     Phone Number Patient can be reached at: Home number on file [phone] (home)    Best Time: Any    Can we leave a detailed message on this number? YES    Call taken on 5/1/2019 at 5:30 PM by Stormy Piña

## 2019-05-02 NOTE — TELEPHONE ENCOUNTER
Rheumatology team: Please call to notify Mr. Pineda that I do not recommend changing the Humira frequency of administration at this point.    Oneil Baxter MD  5/1/2019 10:56 PM

## 2019-05-02 NOTE — TELEPHONE ENCOUNTER
Caller  does not have any improvement  In ear pain after  UC visit on 04/27/19  Still with ear pain; wonders if he should see ENR  Triage protocol reviewed and   EMR and AVS reviewed   Advised in home care for ear pain    ____________________  Office Visit     4/27/2019  [facility] Antonieta Heck PA-C       Physician Assistant - Medical                PLAN: Difficult to prescribe antibiotics with his multiple allergies.  Will try oral Bactrim and Polytrim eyedrops in the ear.  Lots of rest and fluids.  RTC if any worsening symptoms or if not improving.     Antonieta Mccormack PA-C          Advised patient to be seen in clinic this week   Call transferred to     Reason for Disposition    [1] Taking antibiotic > 72 hours (3 days) and [2] pain persists or recurs    Protocols used: EAR - OTITIS MEDIA FOLLOW-UP CALL-A-

## 2019-05-02 NOTE — TELEPHONE ENCOUNTER
Left voicemail for patient to return call to [phone].    Neal Amanda RN....5/2/2019 10:14 AM

## 2019-05-03 ENCOUNTER — OFFICE VISIT (OUTPATIENT)
Dept: FAMILY MEDICINE | Facility: CLINIC | Age: 44
End: 2019-05-03
Payer: MEDICARE

## 2019-05-03 VITALS
RESPIRATION RATE: 18 BRPM | SYSTOLIC BLOOD PRESSURE: 109 MMHG | TEMPERATURE: 97.6 F | HEART RATE: 73 BPM | WEIGHT: 315 LBS | OXYGEN SATURATION: 96 % | BODY MASS INDEX: 37.19 KG/M2 | DIASTOLIC BLOOD PRESSURE: 75 MMHG | HEIGHT: 77 IN

## 2019-05-03 DIAGNOSIS — H60.391 ACUTE INFECTIVE OTITIS EXTERNA OF RIGHT EAR: ICD-10-CM

## 2019-05-03 DIAGNOSIS — H65.191 OTHER ACUTE NONSUPPURATIVE OTITIS MEDIA OF RIGHT EAR, RECURRENCE NOT SPECIFIED: ICD-10-CM

## 2019-05-03 DIAGNOSIS — Z86.69 HISTORY OF FREQUENT EAR INFECTIONS: Primary | ICD-10-CM

## 2019-05-03 PROCEDURE — 99214 OFFICE O/P EST MOD 30 MIN: CPT | Performed by: NURSE PRACTITIONER

## 2019-05-03 RX ORDER — CLINDAMYCIN HCL 300 MG
300 CAPSULE ORAL 3 TIMES DAILY
Status: CANCELLED | OUTPATIENT
Start: 2019-05-03

## 2019-05-03 RX ORDER — LEVOFLOXACIN 500 MG/1
500 TABLET, FILM COATED ORAL DAILY
Qty: 7 TABLET | Refills: 0 | Status: SHIPPED | OUTPATIENT
Start: 2019-05-03 | End: 2019-05-28

## 2019-05-03 ASSESSMENT — MIFFLIN-ST. JEOR: SCORE: 2508.32

## 2019-05-03 ASSESSMENT — PAIN SCALES - GENERAL: PAINLEVEL: MILD PAIN (2)

## 2019-05-03 NOTE — PROGRESS NOTES
SUBJECTIVE:   Joel Pineda is a 43 year old male who presents to clinic today for the following   health issues:      ED/UC Followup:    Facility:  [facility]  Date of visit: 4/27/19  Reason for visit: Rt ear pain  Current Status: RT ear still some fullness/pain. LT ear has some pressure as well     Has some varying pain in ear still. 1 more day with antibiotic. Has been on biologics since last April for ankylosing spondylitis. Breathing doing okay. Getting a little more nasal / post nasal drip. Slight 'issue' with left ear. It is 'a little off'. No SOB or chest pain, no throat swelling. Has been on z-pack in the past and tolerated okay.     Having headaches, trying some stretches. Takes Maxalt at times.     Had some diarrhea along with drainage-worst in years few days ago. Used some imodium. Things better today.       Additional history: as documented    Reviewed  and updated as needed this visit by clinical staff  Tobacco  Allergies  Meds  Problems  Med Hx  Surg Hx  Fam Hx         Reviewed and updated as needed this visit by Provider  Tobacco  Allergies  Meds  Problems  Med Hx  Surg Hx  Fam Hx         Patient Active Problem List   Diagnosis     Major depressive disorder, recurrent episode (H)     Intermittent asthma     Hyperlipidemia LDL goal <100     Chronic nonallergic rhinitis     Diverticulosis     GERD (gastroesophageal reflux disease)     Anxiety     LLOYD (obstructive sleep apnea)- mild (AHI 11)     Intracranial arachnoid cyst     Facet arthritis of cervical region (H)     Acquired hypothyroidism     Bipolar 2 disorder (H)     Chronic midline low back pain without sciatica     Irritable bowel syndrome with diarrhea     B12 deficiency     Essential hypertension with goal blood pressure less than 140/90     Chronic pain syndrome     Ankylosing spondylitis of sacral region (H)     Morbid obesity due to hypertriglyceridemia (H)     Fatty infiltration of liver     DDD (degenerative disc disease),  lumbar     Type 2 diabetes mellitus with complication, without long-term current use of insulin (H)     Peripheral polyneuropathy     History of pulmonary embolism     Past Surgical History:   Procedure Laterality Date     BACK SURGERY  10/07    lumbar discectomy L5-S1     COLONOSCOPY      Note: colonoscopy scheduled with [facility] on Friday, 9/4/15     COSMETIC SURGERY  2012    Nose Exterior - functional     GI SURGERY  August 2013    Sigmoidectomy     HERNIA REPAIR, UMBILICAL  8/23/11    small, Dr. Evan Beavers     INCISION AND DRAINAGE, ABSCESS, COMPLEX  8/23/11    umbilical, Dr. Evan Beavers     LAPAROSCOPIC ASSISTED COLECTOMY LEFT (DESCENDING)  8/15/2013    Procedure: LAPAROSCOPIC ASSISTED COLECTOMY LEFT (DESCENDING);  Laparoscopic Hand Assisted Sigmoid Resection, Mobilization of Splenic Fissure, coloproctoscopy, *Latex Free Room* Anesthesia General with Pain block  ;  Surgeon: Aurora Justice MD;  Location: [facility] OR     NERVE SURGERY  8/18/11    RF ablation @ L3-S1 @ [facility]     RECONSTRUCT NOSE AND SEPTUM (FUNCTIONAL)  10/14/2011    Procedure:RECONSTRUCT NOSE AND SEPTUM (FUNCTIONAL); Functional Septorhinoplasty, Turbinate Reduction, ; Surgeon:CEDRIC CUEVAS; Location:[facility] OR     SINUS SURGERY  10/1/01    ethmoidectomy chronic sinusitis       Social History     Tobacco Use     Smoking status: Never Smoker     Smokeless tobacco: Never Used   Substance Use Topics     Alcohol use: Yes     Alcohol/week: 0.0 oz     Drinks per session: 1 or 2     Binge frequency: Weekly     Comment: occ 1/week     Family History   Problem Relation Age of Onset     Musculoskeletal Disorder Mother         back     Anxiety Disorder Mother      Colon Polyps Mother      Ulcerative Colitis Mother         and ischemic small intestine, surgery     Hypertension Mother      Breast Cancer Mother      Osteoporosis Mother      Diabetes Mother         Type 2, Diagnosed in 2014     Depression Mother         Takes Cymbalta to help with chronic pain + depx  "    Thyroid Disease Mother         Hypothyroidism     Obesity Mother         Under much better control latter half of 2015     Musculoskeletal Disorder Father         back     Substance Abuse Father      Hypertension Father      Hyperlipidemia Father      Depression Father         Off meds for many years. Seems \"ok\"     Heart Disease Maternal Grandmother      Heart Disease Maternal Grandfather      Psychotic Disorder Paternal Grandfather      Suicide Paternal Grandfather      Depression Paternal Grandfather         Pediatrician. Committed suicide by pistol in 1990.     Musculoskeletal Disorder Brother         back     Depression Brother         Expressed as anger and moodiness     Substance Abuse Sister      Depression Sister         Mental Health Therapist, yet no anti-depressants?     Anxiety Disorder Sister         Mental Health Therapist, yet no anti-anxiety meds?     Other Cancer Other         Bladder Cancer - Fatal     Substance Abuse Brother      Colon Cancer No family hx of      Crohn's Disease No family hx of      Anesthesia Reaction No family hx of      Cancer No family hx of         No family history of skin cancer         Current Outpatient Medications   Medication Sig Dispense Refill     acetaminophen 500 MG CAPS Take 500 mg by mouth every 4 hours as needed 60 capsule      adalimumab (HUMIRA *CF*) 40 MG/0.4ML pen kit Inject 0.4 mLs (40 mg) Subcutaneous every 14 days . Hold for signs of infection, then seek medical attention. 0.8 mL 5     albuterol (PROAIR HFA/PROVENTIL HFA/VENTOLIN HFA) 108 (90 BASE) MCG/ACT Inhaler Inhale 2 puffs into the lungs every 4 hours as needed       ALPRAZolam (XANAX XR) 1 MG 24 hr tablet Take 1 mg by mouth daily       aspirin (ASA) 81 MG tablet Take 81 mg by mouth 2 times daily       blood glucose monitoring (NO BRAND SPECIFIED) meter device kit Use to test blood sugar 2 times daily or as directed. Include test strips (2 boxes) with 3 refills 1 kit 0     celecoxib (CELEBREX) " 200 MG capsule TAKE 1 CAPSULE BY MOUTH TWICE DAILY AS NEEDED FOR MODERATE PAIN. 180 capsule 0     cetirizine (ZYRTEC) 10 MG tablet Take 1 tablet (10 mg) by mouth At Bedtime 30 tablet 11     cyanocobalamin (VITAMIN B12) 1000 MCG/ML injection Inject 1 mL (1,000 mcg) into the muscle every 30 days 10 mL 0     DULoxetine (CYMBALTA) 60 MG capsule Take 60 mg by mouth daily       EPINEPHrine (EPIPEN/ADRENACLICK/OR ANY BX GENERIC EQUIV) 0.3 MG/0.3ML injection 2-pack Inject 0.3 mLs (0.3 mg) into the muscle once as needed for anaphylaxis 0.6 mL 3     Fexofenadine HCl (MUCINEX ALLERGY PO)        fluticasone-salmeterol (ADVAIR DISKUS) 250-50 MCG/DOSE diskus inhaler Inhale 1 puff into the lungs 2 times daily        Ginger, Zingiber officinalis, (GINGER ROOT) 550 MG CAPS capsule Take 550 mg by mouth Up to three times daily       hydrOXYzine (ATARAX) 50 MG tablet Take  mg by mouth as needed For anxiety and insomnia       lamoTRIgine (LAMICTAL) 100 MG tablet Take 200 mg by mouth daily       levofloxacin (LEVAQUIN) 500 MG tablet Take 1 tablet (500 mg) by mouth daily for 7 days 7 tablet 0     levothyroxine (SYNTHROID/LEVOTHROID) 75 MCG tablet TAKE 1 TABLET EVERY MORNING 90 tablet 1     lidocaine (XYLOCAINE) 5 % external ointment UAD 30 g      loperamide (IMODIUM) 2 MG capsule Take 2 mg by mouth 4 times daily as needed for diarrhea       metFORMIN (GLUCOPHAGE-XR) 500 MG 24 hr tablet Take 2 tablets (1,000 mg) by mouth daily (with dinner) 180 tablet 3     metoprolol succinate ER (TOPROL-XL) 200 MG 24 hr tablet Take 2 tablets (400 mg) by mouth daily 180 tablet 1     montelukast (SINGULAIR) 10 MG tablet Take 10 mg by mouth daily        omega-3 acid ethyl esters (LOVAZA) 1 g capsule TAKE 2 CAPSULES BY MOUTH TWICE DAILY. 360 capsule 1     omeprazole 20 MG tablet Take 20 mg by mouth daily        ondansetron (ZOFRAN) 8 MG tablet TAKE 1 TABLET BY MOUTH EVERY 8 HOURS AS NEEDED FOR NAUSEA OR VOMITING 20 tablet 4     order for DME  Respironics REMSTAR 60 Series Auto CPAP 9-13 cm H2O, Wisp nasal mask w/a large cushion and a chinstrap       oxyCODONE (ROXICODONE) 5 MG tablet Take 1-2 tablets (5-10 mg) by mouth every 6 hours as needed for pain (maximum 4 tablet(s) per day) 35 tablet 0     pregabalin (LYRICA) 150 MG capsule Take 1 capsule (150 mg) by mouth 2 times daily 60 capsule 11     pseudoePHEDrine (SUDAFED) 120 MG 12 hr tablet Take 1 tablet (120 mg) by mouth every 12 hours 28 tablet 0     ramipril (ALTACE) 10 MG capsule Take 1 capsule (10 mg) by mouth daily 90 capsule 1     rizatriptan (MAXALT-MLT) 5 MG ODT tab Take 1 tablet (5 mg) by mouth at onset of headache for migraine 30 tablet 5     rosuvastatin (CRESTOR) 40 MG tablet Take 1 tablet (40 mg) by mouth daily 90 tablet 2     syringe, disposable, (BD TUBERCULIN SYRINGE) 1 ML MISC Equipment being ordered: 1 ml tuberculin syringes to be used for Vitamin B12 injections. 12 each 1     tiZANidine (ZANAFLEX) 4 MG tablet TAKE 1 TABLET BY MOUTH THREE TIMES DAILY AS NEEDED 90 tablet 4     trimethoprim-polymyxin b (POLYTRIM) 74026-5.1 UNIT/ML-% ophthalmic solution One drop left ear four times a day for 7 days. 1 Bottle 0     vitamin C (ASCORBIC ACID) 500 MG tablet Take 500 mg by mouth daily       vitamin D3 (CHOLECALCIFEROL) 58129 UNITS capsule Take one capsule every two weeks. 24 capsule 1     risperiDONE (RISPERDAL) 0.25 MG tablet Take 0.25 mg by mouth daily       Allergies   Allergen Reactions     Amoxicillin-Pot Clavulanate Difficulty breathing     Banana Shortness Of Breath     Pt reports organic Banana is okay.      Nitroglycerin Palpitations     Penicillins Anaphylaxis     Provigil [Modafinil] Shortness Of Breath     headache     Ciprofloxacin Palpitations, Other (See Comments) and Rash     dizziness (versus metronidazole taken at same time)     Gadolinium Hives and Itching     Patient was premedicated for the contrast allergy. He did still have a reaction a few hours after injection. Hives  "and itching. Dr. Gomez told tech to inform pt he should only have contrast again in the future when premedicated and at a hospital. Not at an outpatient facility.      Dye [Contrast Dye] Other (See Comments) and Hives     Moderate flushing, CT contrast     Golimumab      Hives, bradycardia, face swelling     Neurontin [Gabapentin] Hives     Moderate hives     Nortriptyline Hives     Varicella Virus Vaccine Live      Rash     Ciprofloxacin Palpitations     Flagyl [Metronidazole Hcl] Palpitations and Hives     Latex Rash     Metronidazole Palpitations, Other (See Comments) and Rash     dizziness (versus ciprofloxacin taken at same time)     No Clinical Screening - See Comments Rash     Nitrile gloves       ROS:  Constitutional, HEENT-as above, cardiovascular, pulmonary, gi and gu systems are negative, except as otherwise noted.    OBJECTIVE:     /75 (BP Location: Right arm, Patient Position: Sitting, Cuff Size: Adult Large)   Pulse 73   Temp 97.6  F (36.4  C) (Oral)   Resp 18   Ht 1.956 m (6' 5\")   Wt (!) 150.6 kg (332 lb)   SpO2 96%   BMI 39.37 kg/m    Body mass index is 39.37 kg/m .  GENERAL: healthy, alert and no distress  EYES: Eyes grossly normal to inspection, PERRL and conjunctivae and sclerae normal  HENT: left ear canal normal and TM slight fluid behind, right canal slight discharge, TM red, nose and mouth without ulcers or lesions  NECK: no adenopathy, no asymmetry, masses, or scars and thyroid normal to palpation  RESP: lungs clear to auscultation - no rales, rhonchi or wheezes  CV: regular rate and rhythm, normal S1 S2, no S3 or S4, no murmur, click or rub    Diagnostic Test Results:  none     ASSESSMENT/PLAN:         1. History of frequent ear infections  Follow up with ENT due to frequent ear infections  - OTOLARYNGOLOGY REFERRAL    2. Acute infective otitis externa of right ear  Trial for ear infection, advised this is just until he can follow up with ENT  - levofloxacin (LEVAQUIN) " 500 MG tablet; Take 1 tablet (500 mg) by mouth daily for 7 days  Dispense: 7 tablet; Refill: 0    3. Other acute nonsuppurative otitis media of right ear, recurrence not specified  As above  - levofloxacin (LEVAQUIN) 500 MG tablet; Take 1 tablet (500 mg) by mouth daily for 7 days  Dispense: 7 tablet; Refill: 0    FUTURE APPOINTMENTS:       - Follow-up visit in  7 days racheln    JOCELYN Pro, NP-C  [facility]

## 2019-05-07 NOTE — TELEPHONE ENCOUNTER
Attempted to contact Pt, l/m in detail re: Dr Baxter does not recommend changing the Humira frequency of administration at this point. Having Pt return call to clinic @ [phone] should he have any additional questions or concerns.    Yoli Han, LUDA  5/7/2019  10:56 AM

## 2019-05-08 ENCOUNTER — ANCILLARY PROCEDURE (OUTPATIENT)
Dept: CT IMAGING | Facility: CLINIC | Age: 44
End: 2019-05-08
Attending: NURSE PRACTITIONER
Payer: MEDICARE

## 2019-05-08 ENCOUNTER — OFFICE VISIT (OUTPATIENT)
Dept: FAMILY MEDICINE | Facility: CLINIC | Age: 44
End: 2019-05-08
Payer: MEDICARE

## 2019-05-08 VITALS
SYSTOLIC BLOOD PRESSURE: 137 MMHG | TEMPERATURE: 97.6 F | OXYGEN SATURATION: 98 % | WEIGHT: 315 LBS | DIASTOLIC BLOOD PRESSURE: 89 MMHG | HEIGHT: 77 IN | HEART RATE: 91 BPM | BODY MASS INDEX: 37.19 KG/M2 | RESPIRATION RATE: 20 BRPM

## 2019-05-08 DIAGNOSIS — H92.01 RIGHT EAR PAIN: Primary | ICD-10-CM

## 2019-05-08 DIAGNOSIS — H61.21 IMPACTED CERUMEN OF RIGHT EAR: ICD-10-CM

## 2019-05-08 DIAGNOSIS — H92.01 RIGHT EAR PAIN: ICD-10-CM

## 2019-05-08 PROCEDURE — 99213 OFFICE O/P EST LOW 20 MIN: CPT | Mod: 25 | Performed by: NURSE PRACTITIONER

## 2019-05-08 PROCEDURE — 69209 REMOVE IMPACTED EAR WAX UNI: CPT | Performed by: NURSE PRACTITIONER

## 2019-05-08 PROCEDURE — 70450 CT HEAD/BRAIN W/O DYE: CPT | Performed by: RADIOLOGY

## 2019-05-08 RX ORDER — CIPROFLOXACIN AND DEXAMETHASONE 3; 1 MG/ML; MG/ML
4 SUSPENSION/ DROPS AURICULAR (OTIC) 2 TIMES DAILY
Qty: 2.8 ML | Refills: 0 | Status: SHIPPED | OUTPATIENT
Start: 2019-05-08 | End: 2019-05-29

## 2019-05-08 ASSESSMENT — PAIN SCALES - GENERAL: PAINLEVEL: MODERATE PAIN (4)

## 2019-05-08 ASSESSMENT — MIFFLIN-ST. JEOR: SCORE: 2499.25

## 2019-05-08 NOTE — PROGRESS NOTES
SUBJECTIVE:   Joel Pineda is a 43 year old male who presents to clinic today for the following   health issues:      Recheck Ear pain  -still having RT ear pain and the surrounding area.  -headache on rt temporal area    Almost went to ER Saturday. Having right ear stabbing pain. Still very sore outside, antibiotic isn't helping much    Does not see ear nose throat until next Friday      Additional history: as documented    Reviewed  and updated as needed this visit by clinical staff  Tobacco  Allergies  Meds  Problems  Med Hx  Surg Hx  Fam Hx         Reviewed and updated as needed this visit by Provider  Tobacco  Allergies  Meds  Problems  Med Hx  Surg Hx  Fam Hx         Patient Active Problem List   Diagnosis     Major depressive disorder, recurrent episode (H)     Intermittent asthma     Hyperlipidemia LDL goal <100     Chronic nonallergic rhinitis     Diverticulosis     GERD (gastroesophageal reflux disease)     Anxiety     LLOYD (obstructive sleep apnea)- mild (AHI 11)     Intracranial arachnoid cyst     Facet arthritis of cervical region (H)     Acquired hypothyroidism     Bipolar 2 disorder (H)     Chronic midline low back pain without sciatica     Irritable bowel syndrome with diarrhea     B12 deficiency     Essential hypertension with goal blood pressure less than 140/90     Chronic pain syndrome     Ankylosing spondylitis of sacral region (H)     Morbid obesity due to hypertriglyceridemia (H)     Fatty infiltration of liver     DDD (degenerative disc disease), lumbar     Type 2 diabetes mellitus with complication, without long-term current use of insulin (H)     Peripheral polyneuropathy     History of pulmonary embolism     Past Surgical History:   Procedure Laterality Date     BACK SURGERY  10/07    lumbar discectomy L5-S1     COLONOSCOPY      Note: colonoscopy scheduled with [facility] on Friday, 9/4/15     COSMETIC SURGERY  2012    Nose Exterior - functional     GI SURGERY  August 2013     "Sigmoidectomy     HERNIA REPAIR, UMBILICAL  8/23/11    Dr. Evan whiting     INCISION AND DRAINAGE, ABSCESS, COMPLEX  8/23/11    umbilical, Dr. Evan Beavers     LAPAROSCOPIC ASSISTED COLECTOMY LEFT (DESCENDING)  8/15/2013    Procedure: LAPAROSCOPIC ASSISTED COLECTOMY LEFT (DESCENDING);  Laparoscopic Hand Assisted Sigmoid Resection, Mobilization of Splenic Fissure, coloproctoscopy, *Latex Free Room* Anesthesia General with Pain block  ;  Surgeon: Aurora Justice MD;  Location: [facility] OR     NERVE SURGERY  8/18/11    RF ablation @ L3-S1 @ [facility]     RECONSTRUCT NOSE AND SEPTUM (FUNCTIONAL)  10/14/2011    Procedure:RECONSTRUCT NOSE AND SEPTUM (FUNCTIONAL); Functional Septorhinoplasty, Turbinate Reduction, ; Surgeon:CEDRIC CUEVAS; Location:[facility] OR     SINUS SURGERY  10/1/01    ethmoidectomy chronic sinusitis       Social History     Tobacco Use     Smoking status: Never Smoker     Smokeless tobacco: Never Used   Substance Use Topics     Alcohol use: Yes     Alcohol/week: 0.0 oz     Drinks per session: 1 or 2     Binge frequency: Weekly     Comment: occ 1/week     Family History   Problem Relation Age of Onset     Musculoskeletal Disorder Mother         back     Anxiety Disorder Mother      Colon Polyps Mother      Ulcerative Colitis Mother         and ischemic small intestine, surgery     Hypertension Mother      Breast Cancer Mother      Osteoporosis Mother      Diabetes Mother         Type 2, Diagnosed in 2014     Depression Mother         Takes Cymbalta to help with chronic pain + depx     Thyroid Disease Mother         Hypothyroidism     Obesity Mother         Under much better control latter half of 2015     Musculoskeletal Disorder Father         back     Substance Abuse Father      Hypertension Father      Hyperlipidemia Father      Depression Father         Off meds for many years. Seems \"ok\"     Heart Disease Maternal Grandmother      Heart Disease Maternal Grandfather      Psychotic Disorder Paternal " "Grandfather      Suicide Paternal Grandfather      Depression Paternal Grandfather         Pediatrician. Committed suicide by pistol in 1990.     Musculoskeletal Disorder Brother         back     Depression Brother         Expressed as anger and moodiness     Substance Abuse Sister      Depression Sister         Mental Health Therapist, yet no anti-depressants?     Anxiety Disorder Sister         Mental Health Therapist, yet no anti-anxiety meds?     Other Cancer Other         Bladder Cancer - Fatal     Substance Abuse Brother      Colon Cancer No family hx of      Crohn's Disease No family hx of      Anesthesia Reaction No family hx of      Cancer No family hx of         No family history of skin cancer           ROS:  HEENT    OBJECTIVE:     /89 (BP Location: Right arm, Patient Position: Sitting, Cuff Size: Adult Large)   Pulse 91   Temp 97.6  F (36.4  C) (Oral)   Resp 20   Ht 1.956 m (6' 5\")   Wt 149.7 kg (330 lb)   SpO2 98%   BMI 39.13 kg/m    Body mass index is 39.13 kg/m .  GENERAL: healthy, alert and no distress  HENT: left ear canal and TM's normal, right canal at the end slight white discharge, TM covered in white film, nose and mouth without ulcers or lesions  NECK: no adenopathy, no asymmetry, masses, or scars and thyroid normal to palpation, but slight tenderness just under right ear and into right jaw    Diagnostic Test Results:  No results found. However, due to the size of the patient record, not all encounters were searched. Please check Results Review for a complete set of results.    ASSESSMENT/PLAN:             1. Right ear pain  Change eardrops to Ciprodex, get CT of head as he is having now stabbing pain here.  This is his second round of antibiotics being on the Levaquin.  He has follow-up with ENT in 1.5 weeks.  - CT Head w/o Contrast; Future  - ciprofloxacin-dexamethasone (CIPRODEX) 0.3-0.1 % otic suspension; Place 4 drops into the right ear 2 times daily for 7 days  Dispense: " 2.8 mL; Refill: 0    2. Impacted cerumen of right ear  Attempted to irrigate right ear slightly.  Only a little discharge came out  - HC REMOVAL IMPACTED CERUMEN IRRIGATION/LVG UNILAT    FUTURE APPOINTMENTS:       - Follow-up visit in the next week if not improving    JOCELYN Pro, NP-C  [facility] [place]    This chart was documented by provider using a voice activated software called Dragon in addition to manual typing. There may be vocabulary errors or other grammatical errors due to this.     Patient was okay with student nurse practitioner present

## 2019-05-13 ENCOUNTER — MYC REFILL (OUTPATIENT)
Dept: FAMILY MEDICINE | Facility: CLINIC | Age: 44
End: 2019-05-13

## 2019-05-13 DIAGNOSIS — G89.29 CHRONIC MIDLINE LOW BACK PAIN WITHOUT SCIATICA: ICD-10-CM

## 2019-05-13 DIAGNOSIS — M51.369 DDD (DEGENERATIVE DISC DISEASE), LUMBAR: ICD-10-CM

## 2019-05-13 DIAGNOSIS — M54.50 CHRONIC MIDLINE LOW BACK PAIN WITHOUT SCIATICA: ICD-10-CM

## 2019-05-13 DIAGNOSIS — M45.8 ANKYLOSING SPONDYLITIS OF SACRAL REGION (H): ICD-10-CM

## 2019-05-13 NOTE — TELEPHONE ENCOUNTER
Requested Prescriptions   Pending Prescriptions Disp Refills     oxyCODONE (ROXICODONE) 5 MG tablet 35 tablet 0     Sig: Take 1-2 tablets (5-10 mg) by mouth every 6 hours as needed for pain (maximum 4 tablet(s) per day)       There is no refill protocol information for this order          oxyCODONE (ROXICODONE) 5 MG tablet      Last Written Prescription Date:  4/8/19  Last Fill Quantity: 35,   # refills: 0  Last Office Visit: 5/8/19  Future Office visit:    Next 5 appointments (look out 90 days)    May 17, 2019  8:30 AM CDT  [facility] with Matthew Garcia  [facility] [address] [place] [id number]  [phone]   May 29, 2019  1:20 PM CDT  Return Visit with Oneil Baxter MD  [facility] [address] [place] [address]  [phone]   Jun 04, 2019  1:00 PM CDT  [facility] Visit with Elaine Segovia MD  [facility] [id number] [place] [id number] [phone]           Routing refill request to provider for review/approval because:  Drug not on the [facility], [facility] or [facility] refill protocol or controlled substance

## 2019-05-14 NOTE — TELEPHONE ENCOUNTER
Controlled Substance Refill Request for oxycodone  Problem List Complete:  Yes  Problem Detail     Noted:  4/4/2017   Priority:  Medium   Overview Addendum 2/25/2019 12:18 PM by Martell Chaves RN   Patient is followed by Ksenia Lyles MD for ongoing prescription of pain medication.  All refills should only be approved by this provider, or covering partner.     Medication(s): oxy 5.   Maximum quantity per month: 40  Clinic visit frequency required: Q 6  months      Controlled substance agreement:  Encounter-Level CSA - 04/04/2017:            Controlled Substance Agreement - Scan on 4/11/2017  1:30 PM : CONTROLLED SUBSTANCE AGREEMENT (below)                   Pain Clinic evaluation in the past: Yes     DIRE Total Score(s):  No flowsheet data found.     Last [facility] website verification:  02/25/19    [email or web address]       checked in past 3 months?  Yes 2/25/19   Last Written Prescription Date:  4/8/19  Last Fill Quantity: 35 tablets  # refills: 0   Last office visit: 5/8/2019 with prescribing provider:  5/8/19   Future Office Visit:   Next 5 appointments (look out 90 days)    May 17, 2019  8:30 AM CDT  Return Visit with Matthew Garcia  [facility] ([facility] [address] [facility] [address]  [phone]   May 29, 2019  1:20 PM CDT  Return Visit with Oneil Baxter MD  [facility] ([facility]) [address] [place] [address]  [phone]   Jun 04, 2019  1:00 PM CDT  Return Visit with Elaine Segovia MD  [facility] [address] [address] [place] [phone]  [phone]         [place] reviewed:  not reviewed/not due - last done on 2/25/19    [place] profile:  [email or web address]          Martell Chaves RN, BSN, PHN

## 2019-05-14 NOTE — TELEPHONE ENCOUNTER
"Requested Prescriptions   Pending Prescriptions Disp Refills     celecoxib (CELEBREX) 200 MG capsule  Last Written Prescription Date:  12/14/18  Last Fill Quantity: 180 capsule,  # refills: 0   Last office visit: 5/8/2019 with prescribing provider:  Dr. Lyles   Future Office Visit:   Next 5 appointments (look out 90 days)    May 17, 2019  8:30 AM CDT  [facility] with Matthew Garcia  [facility] [address] [facility] [address]  [phone]   May 29, 2019  1:20 PM CDT  Return Visit with Oneil Baxter MD  [facility] ([facility]) [address] [facility] [address]  [phone]   Jun 04, 2019  1:00 PM CDT  Return Visit with Elaine Segovia MD  [facility] [id number] [place] [id number]  [phone]          180 capsule 0     Sig: TAKE 1 CAPSULE BY MOUTH TWICE DAILY AS NEEDED FOR MODERATE PAIN.       NSAID Medications Passed - 5/14/2019  6:11 AM        Passed - Blood pressure under 140/90 in past 12 months     BP Readings from Last 3 Encounters:   05/08/19 137/89   05/03/19 109/75   04/27/19 138/84                 Passed - Normal ALT on file in past 12 months     Recent Labs   Lab Test 04/01/19  1540   ALT 42             Passed - Normal AST on file in past 12 months     Recent Labs   Lab Test 04/01/19  1540   AST 36             Passed - Recent (12 mo) or future (30 days) visit within the authorizing provider's specialty     Patient had office visit in the last 12 months or has a visit in the next 30 days with authorizing provider or within the authorizing provider's specialty.  See \"Patient Info\" tab in inbasket, or \"Choose Columns\" in Meds & Orders section of the refill encounter.              Passed - Patient is age 6-64 years        Passed - Normal CBC on file in past 12 months     Recent Labs   Lab Test 04/01/19  1540   WBC 10.3   RBC 5.32 "   HGB 16.0   HCT 46.8                    Passed - Medication is active on med list        Passed - Normal serum creatinine on file in past 12 months     Recent Labs   Lab Test 04/01/19  1540   CR 0.93

## 2019-05-15 RX ORDER — OXYCODONE HYDROCHLORIDE 5 MG/1
5-10 TABLET ORAL EVERY 6 HOURS PRN
Qty: 35 TABLET | Refills: 0 | Status: SHIPPED | OUTPATIENT
Start: 2019-05-15 | End: 2019-06-12

## 2019-05-15 RX ORDER — CELECOXIB 200 MG/1
CAPSULE ORAL
Qty: 180 CAPSULE | Refills: 1 | Status: SHIPPED | OUTPATIENT
Start: 2019-05-15 | End: 2019-11-10

## 2019-05-15 NOTE — TELEPHONE ENCOUNTER
Prescription approved per [facility] Refill Protocol.      Martell Chaves RN, BSN, PHN

## 2019-05-17 ENCOUNTER — OFFICE VISIT (OUTPATIENT)
Dept: OTOLARYNGOLOGY | Facility: CLINIC | Age: 44
End: 2019-05-17
Payer: MEDICARE

## 2019-05-17 ENCOUNTER — OFFICE VISIT (OUTPATIENT)
Dept: AUDIOLOGY | Facility: CLINIC | Age: 44
End: 2019-05-17
Payer: MEDICARE

## 2019-05-17 VITALS
RESPIRATION RATE: 16 BRPM | DIASTOLIC BLOOD PRESSURE: 98 MMHG | HEART RATE: 83 BPM | SYSTOLIC BLOOD PRESSURE: 145 MMHG | OXYGEN SATURATION: 96 % | HEIGHT: 77 IN | BODY MASS INDEX: 37.19 KG/M2 | WEIGHT: 315 LBS

## 2019-05-17 DIAGNOSIS — H60.61 CHRONIC OTITIS EXTERNA OF RIGHT EAR, UNSPECIFIED TYPE: Primary | ICD-10-CM

## 2019-05-17 DIAGNOSIS — H93.13 TINNITUS, BILATERAL: Primary | ICD-10-CM

## 2019-05-17 PROCEDURE — 99207 ZZC NO CHARGE LOS: CPT | Performed by: AUDIOLOGIST

## 2019-05-17 PROCEDURE — 92557 COMPREHENSIVE HEARING TEST: CPT | Performed by: AUDIOLOGIST

## 2019-05-17 PROCEDURE — 92550 TYMPANOMETRY & REFLEX THRESH: CPT | Performed by: AUDIOLOGIST

## 2019-05-17 PROCEDURE — 99213 OFFICE O/P EST LOW 20 MIN: CPT | Performed by: OTOLARYNGOLOGY

## 2019-05-17 RX ORDER — CLOTRIMAZOLE 1 G/ML
1 SOLUTION TOPICAL 2 TIMES DAILY
Qty: 10 ML | Refills: 3 | Status: SHIPPED | OUTPATIENT
Start: 2019-05-17 | End: 2019-10-08

## 2019-05-17 RX ORDER — CIPROFLOXACIN AND DEXAMETHASONE 3; 1 MG/ML; MG/ML
5 SUSPENSION/ DROPS AURICULAR (OTIC)
Qty: 1 BOTTLE | Refills: 3 | Status: SHIPPED | OUTPATIENT
Start: 2019-05-20 | End: 2019-10-08

## 2019-05-17 ASSESSMENT — MIFFLIN-ST. JEOR: SCORE: 2499.25

## 2019-05-17 NOTE — PATIENT INSTRUCTIONS
Scheduling Information  To schedule your CT/MRI scan, please contact [facility] at [phone] OR [facility] at [phone]    To schedule your Surgery, please contact our Specialty Schedulers at [phone]      ENT Clinic Locations Clinic Hours Telephone Number     [facility]  [address] [place] [address]   Monday:           1:00pm -- 5:00pm    Friday:              8:00am - 12:00pm   To schedule/reschedule an appointment with   Dr. Jimenez,   please contact our   Specialty Scheduling Department at:     [phone]       [facility]  [address] [place] [address] Tuesday:          8:00am -- 2:00pm         Urgent Care Locations Clinic Hours Telephone Numbers     [facility]  [address] [place] [address]     Monday-Friday:     11:00am - 9:00pm    Saturday-Sunday:  9:00am - 5:00pm   [phone]     [facility]  [address] [place] [address]     Monday-Friday:      5:00pm - 9:00pm     Saturday-Sunday:  9:00am - 5:00pm   [phone]

## 2019-05-17 NOTE — PROGRESS NOTES
AUDIOLOGY REPORT:    Patient was referred to Audiology from ENT by Dr. Jimenez for a hearing examination. Patient reports chronic bilateral ear infections since last October. Patient reports bilateral tinnitus. Patient was unaccompanied to today's visit.     Testing:    Otoscopy:   Otoscopic exam indicates ears are clear of cerumen bilaterally     Tympanograms:    RIGHT: normal eardrum mobility     LEFT:   normal eardrum mobility    Reflexes (reported by stimulus ear):  RIGHT: Ipsilateral is present at normal levels  RIGHT: Contralateral is present at normal levels  LEFT:   Ipsilateral is present at normal levels  LEFT:   Contralateral is present at normal levels    Thresholds:   Pure Tone Thresholds assessed using conventional audiometry with good  reliability from 250-8000 Hz bilaterally using insert earphones     RIGHT:  normal and borderline-normal hearing sensitivity for all frequencies tested.     LEFT:    normal hearing sensitivity for all frequencies tested.     Speech Reception Threshold:    RIGHT: 10 dB HL    LEFT:   15 dB HL    Word Recognition Score:     RIGHT: 100% at 50 dB HL using NU-6 recorded word list.    LEFT:   100% at 50 dB HL using NU-6 recorded word list.    Discussed results with the patient.     Patient was returned to ENT for follow up.     Shady Rodriguez CCC-A  Licensed Audiologist  5/17/2019

## 2019-05-17 NOTE — PROGRESS NOTES
History of Present Illness - Joel (Tito) SONIA Pineda is a 43 year old male last seen 8/21/2018 for RIGHT sided hear and orbital pain following dental surgery.  I felt that it was acute temporomandibular disease.  Although, he did have a history of functional endoscopic sinus surgery.  He had an ethmoidectomy in 2002, and rhinoplasty in 2010.  But no previous ear surgery.    He has come back today for ear symptoms.  He tells me that since October of 2018 he has had recurrent ear infections.  He has been on Humira for his ankylosing spondylitis.  He tells me that he has had some ear canal infections, but also thinks he has had fluid in the middle ear as well.  He has been treated for these by urgent care, PCP, and Kj Lyles as well.    Past Medical History -   Patient Active Problem List   Diagnosis     Major depressive disorder, recurrent episode (H)     Intermittent asthma     Hyperlipidemia LDL goal <100     Chronic nonallergic rhinitis     Diverticulosis     GERD (gastroesophageal reflux disease)     Anxiety     LLOYD (obstructive sleep apnea)- mild (AHI 11)     Intracranial arachnoid cyst     Facet arthritis of cervical region (H)     Acquired hypothyroidism     Bipolar 2 disorder (H)     Chronic midline low back pain without sciatica     Irritable bowel syndrome with diarrhea     B12 deficiency     Essential hypertension with goal blood pressure less than 140/90     Chronic pain syndrome     Ankylosing spondylitis of sacral region (H)     Morbid obesity due to hypertriglyceridemia (H)     Fatty infiltration of liver     DDD (degenerative disc disease), lumbar     Type 2 diabetes mellitus with complication, without long-term current use of insulin (H)     Peripheral polyneuropathy     History of pulmonary embolism       Current Medications -   Current Outpatient Medications:      acetaminophen 500 MG CAPS, Take 500 mg by mouth every 4 hours as needed, Disp: 60 capsule, Rfl:      adalimumab (HUMIRA *CF*) 40  MG/0.4ML pen kit, Inject 0.4 mLs (40 mg) Subcutaneous every 14 days . Hold for signs of infection, then seek medical attention., Disp: 0.8 mL, Rfl: 5     albuterol (PROAIR HFA/PROVENTIL HFA/VENTOLIN HFA) 108 (90 BASE) MCG/ACT Inhaler, Inhale 2 puffs into the lungs every 4 hours as needed, Disp: , Rfl:      ALPRAZolam (XANAX XR) 1 MG 24 hr tablet, Take 1 mg by mouth daily, Disp: , Rfl:      aspirin (ASA) 81 MG tablet, Take 81 mg by mouth 2 times daily, Disp: , Rfl:      blood glucose monitoring (NO BRAND SPECIFIED) meter device kit, Use to test blood sugar 2 times daily or as directed. Include test strips (2 boxes) with 3 refills, Disp: 1 kit, Rfl: 0     celecoxib (CELEBREX) 200 MG capsule, TAKE 1 CAPSULE BY MOUTH TWICE DAILY AS NEEDED FOR MODERATE PAIN., Disp: 180 capsule, Rfl: 1     cetirizine (ZYRTEC) 10 MG tablet, Take 1 tablet (10 mg) by mouth At Bedtime, Disp: 30 tablet, Rfl: 11     cyanocobalamin (VITAMIN B12) 1000 MCG/ML injection, Inject 1 mL (1,000 mcg) into the muscle every 30 days, Disp: 10 mL, Rfl: 0     DULoxetine (CYMBALTA) 60 MG capsule, Take 60 mg by mouth daily, Disp: , Rfl:      EPINEPHrine (EPIPEN/ADRENACLICK/OR ANY BX GENERIC EQUIV) 0.3 MG/0.3ML injection 2-pack, Inject 0.3 mLs (0.3 mg) into the muscle once as needed for anaphylaxis, Disp: 0.6 mL, Rfl: 3     Fexofenadine HCl (MUCINEX ALLERGY PO), , Disp: , Rfl:      fluticasone-salmeterol (ADVAIR DISKUS) 250-50 MCG/DOSE diskus inhaler, Inhale 1 puff into the lungs 2 times daily , Disp: , Rfl:      Ginger, Zingiber officinalis, (GINGER ROOT) 550 MG CAPS capsule, Take 550 mg by mouth Up to three times daily, Disp: , Rfl:      hydrOXYzine (ATARAX) 50 MG tablet, Take  mg by mouth as needed For anxiety and insomnia, Disp: , Rfl:      lamoTRIgine (LAMICTAL) 100 MG tablet, Take 200 mg by mouth daily, Disp: , Rfl:      levothyroxine (SYNTHROID/LEVOTHROID) 75 MCG tablet, TAKE 1 TABLET EVERY MORNING, Disp: 90 tablet, Rfl: 1     lidocaine (XYLOCAINE)  5 % external ointment, UAD, Disp: 30 g, Rfl:      loperamide (IMODIUM) 2 MG capsule, Take 2 mg by mouth 4 times daily as needed for diarrhea, Disp: , Rfl:      metFORMIN (GLUCOPHAGE-XR) 500 MG 24 hr tablet, Take 2 tablets (1,000 mg) by mouth daily (with dinner), Disp: 180 tablet, Rfl: 3     metoprolol succinate ER (TOPROL-XL) 200 MG 24 hr tablet, Take 2 tablets (400 mg) by mouth daily, Disp: 180 tablet, Rfl: 1     montelukast (SINGULAIR) 10 MG tablet, Take 10 mg by mouth daily , Disp: , Rfl:      omega-3 acid ethyl esters (LOVAZA) 1 g capsule, TAKE 2 CAPSULES BY MOUTH TWICE DAILY., Disp: 360 capsule, Rfl: 1     omeprazole 20 MG tablet, Take 20 mg by mouth daily , Disp: , Rfl:      ondansetron (ZOFRAN) 8 MG tablet, TAKE 1 TABLET BY MOUTH EVERY 8 HOURS AS NEEDED FOR NAUSEA OR VOMITING, Disp: 20 tablet, Rfl: 4     order for DME, Respironics REMSTAR 60 Series Auto CPAP 9-13 cm H2O, Wisp nasal mask w/a large cushion and a chinstrap, Disp: , Rfl:      oxyCODONE (ROXICODONE) 5 MG tablet, Take 1-2 tablets (5-10 mg) by mouth every 6 hours as needed for pain (maximum 4 tablet(s) per day), Disp: 35 tablet, Rfl: 0     pregabalin (LYRICA) 150 MG capsule, Take 1 capsule (150 mg) by mouth 2 times daily, Disp: 60 capsule, Rfl: 11     pseudoePHEDrine (SUDAFED) 120 MG 12 hr tablet, Take 1 tablet (120 mg) by mouth every 12 hours, Disp: 28 tablet, Rfl: 0     ramipril (ALTACE) 10 MG capsule, Take 1 capsule (10 mg) by mouth daily, Disp: 90 capsule, Rfl: 1     rizatriptan (MAXALT-MLT) 5 MG ODT tab, Take 1 tablet (5 mg) by mouth at onset of headache for migraine, Disp: 30 tablet, Rfl: 5     rosuvastatin (CRESTOR) 40 MG tablet, Take 1 tablet (40 mg) by mouth daily, Disp: 90 tablet, Rfl: 2     syringe, disposable, (BD TUBERCULIN SYRINGE) 1 ML MISC, Equipment being ordered: 1 ml tuberculin syringes to be used for Vitamin B12 injections., Disp: 12 each, Rfl: 1     tiZANidine (ZANAFLEX) 4 MG tablet, TAKE 1 TABLET BY MOUTH THREE TIMES DAILY AS  NEEDED, Disp: 90 tablet, Rfl: 4     vitamin C (ASCORBIC ACID) 500 MG tablet, Take 500 mg by mouth daily, Disp: , Rfl:      vitamin D3 (CHOLECALCIFEROL) 25809 UNITS capsule, Take one capsule every two weeks., Disp: 24 capsule, Rfl: 1    Allergies -   Allergies   Allergen Reactions     Amoxicillin-Pot Clavulanate Difficulty breathing     Banana Shortness Of Breath     Pt reports organic Banana is okay.      Nitroglycerin Palpitations     Penicillins Anaphylaxis     Provigil [Modafinil] Shortness Of Breath     headache     Gadolinium Hives and Itching     Patient was premedicated for the contrast allergy. He did still have a reaction a few hours after injection. Hives and itching. Dr. Gomez told tech to inform pt he should only have contrast again in the future when premedicated and at a hospital. Not at an outpatient facility.      Dye [Contrast Dye] Other (See Comments) and Hives     Moderate flushing, CT contrast     Golimumab      Hives, bradycardia, face swelling     Neurontin [Gabapentin] Hives     Moderate hives     Nortriptyline Hives     Varicella Virus Vaccine Live      Rash     Flagyl [Metronidazole Hcl] Palpitations and Hives     Latex Rash     Metronidazole Palpitations, Other (See Comments) and Rash     dizziness (versus ciprofloxacin taken at same time)     No Clinical Screening - See Comments Rash     Nitrile gloves       Social History -   Social History     Socioeconomic History     Marital status: Single     Spouse name: Not on file     Number of children: Not on file     Years of education: Not on file     Highest education level: Not on file   Occupational History     Occupation:      Employer: [employer]     Occupation: paraprofessional     Comment: [employer]     Employer: [employer]   Social Needs     Financial resource strain: Not on file     Food insecurity:     Worry: Not on file     Inability: Not on file     Transportation needs:     Medical: Not on file      Non-medical: Not on file   Tobacco Use     Smoking status: Never Smoker     Smokeless tobacco: Never Used   Substance and Sexual Activity     Alcohol use: Yes     Alcohol/week: 0.0 oz     Drinks per session: 1 or 2     Binge frequency: Weekly     Comment: occ 1/week     Drug use: No     Sexual activity: Never     Partners: Female   Lifestyle     Physical activity:     Days per week: Not on file     Minutes per session: Not on file     Stress: Not on file   Relationships     Social connections:     Talks on phone: Not on file     Gets together: Not on file     Attends Hinduism service: Not on file     Active member of club or organization: Not on file     Attends meetings of clubs or organizations: Not on file     Relationship status: Not on file     Intimate partner violence:     Fear of current or ex partner: Not on file     Emotionally abused: Not on file     Physically abused: Not on file     Forced sexual activity: Not on file   Other Topics Concern     Parent/sibling w/ CABG, MI or angioplasty before 65F 55M? No      Service Not Asked     Blood Transfusions Not Asked     Caffeine Concern Not Asked     Occupational Exposure Not Asked     Hobby Hazards Not Asked     Sleep Concern Yes     Stress Concern Yes     Weight Concern Not Asked     Special Diet Not Asked     Back Care Yes     Exercise Not Asked     Bike Helmet Not Asked     Seat Belt Yes     Self-Exams Not Asked   Social History Narrative     Not on file       Family History -   Family History   Problem Relation Age of Onset     Musculoskeletal Disorder Mother         back     Anxiety Disorder Mother      Colon Polyps Mother      Ulcerative Colitis Mother         and ischemic small intestine, surgery     Hypertension Mother      Breast Cancer Mother      Osteoporosis Mother      Diabetes Mother         Type 2, Diagnosed in 2014     Depression Mother         Takes Cymbalta to help with chronic pain + depx     Thyroid Disease Mother          "Hypothyroidism     Obesity Mother         Under much better control latter half of 2015     Musculoskeletal Disorder Father         back     Substance Abuse Father      Hypertension Father      Hyperlipidemia Father      Depression Father         Off meds for many years. Seems \"ok\"     Heart Disease Maternal Grandmother      Heart Disease Maternal Grandfather      Psychotic Disorder Paternal Grandfather      Suicide Paternal Grandfather      Depression Paternal Grandfather         Pediatrician. Committed suicide by pistol in 1990.     Musculoskeletal Disorder Brother         back     Depression Brother         Expressed as anger and moodiness     Substance Abuse Sister      Depression Sister         Mental Health Therapist, yet no anti-depressants?     Anxiety Disorder Sister         Mental Health Therapist, yet no anti-anxiety meds?     Other Cancer Other         Bladder Cancer - Fatal     Substance Abuse Brother      Colon Cancer No family hx of      Crohn's Disease No family hx of      Anesthesia Reaction No family hx of      Cancer No family hx of         No family history of skin cancer       Review of Systems - As per HPI and PMHx, otherwise 10+ system review of the head and neck, and general constitution is negative.    Physical Exam  BP (!) 145/98   Pulse 83   Resp 16   Ht 1.956 m (6' 5\")   Wt 149.7 kg (330 lb)   SpO2 96%   BMI 39.13 kg/m      General - The patient is well nourished and well developed, and appears to have good nutritional status.  Alert and oriented to person and place, answers questions and cooperates with examination appropriately.   Head and Face - Normocephalic and atraumatic, with no gross asymmetry noted of the contour of the facial features.  The facial nerve is intact, with strong symmetric movements.  Voice and Breathing - The patient was breathing comfortably without the use of accessory muscles. There was no wheezing, stridor, or stertor.  The patients voice was clear and " strong, and had appropriate pitch and quality.  Ears - The tympanic membranes are normal in appearance, bony landmarks are intact.  No retraction, perforation, or masses.  No fluid or purulence was seen in the external canal or the middle ear. No evidence of infection of the middle ear or external canal, cerumen was normal in appearance.  Eyes - Extraocular movements intact, and the pupils were reactive to light.  Sclera were not icteric or injected, conjunctiva were pink and moist.  Mouth - Examination of the oral cavity showed pink, healthy oral mucosa. No lesions or ulcerations noted.  The tongue was mobile and midline, and the dentition were in good condition.    Throat - The walls of the oropharynx were smooth, pink, moist, symmetric, and had no lesions or ulcerations.  The tonsillar pillars and soft palate were symmetric.  The uvula was midline on elevation.    Neck - Normal midline excursion of the laryngotracheal complex during swallowing.  Full range of motion on passive movement.  Palpation of the occipital, submental, submandibular, internal jugular chain, and supraclavicular nodes did not demonstrate any abnormal lymph nodes or masses.  The carotid pulse was palpable bilaterally.  Palpation of the thyroid was soft and smooth, with no nodules or goiter appreciated.  The trachea was mobile and midline.  Nose - External contour is symmetric, no gross deflection or scars.  Nasal mucosa is pink and moist with no abnormal mucus.  The septum was midline and non-obstructive, turbinates of normal size and position.  No polyps, masses, or purulence noted on examination.    Audiologic Studies - An audiogram and tympanogram were performed today as part of the evaluation and personally reviewed. The tympanogram shows a normal Type A curve, with normal canal volume and middle ear pressure.  There is no sign of eustachian tube dysfunction or middle ear effusion.  The audiogram was also normal.  The sensorineural  hearing was age-appropriate, with no evidence of conductive hearing loss or significant asymmetry.      A/P - Joel Pineda is a 43 year old male  (H60.61) Chronic otitis externa of right ear, unspecified type  (primary encounter diagnosis)    This appears to be a classic case of immunosuppression related chronic otitis externa.    I will treat with long term suppressive ototopical drops with both clotrimazole and ciprodex, twice weekly, 5 drops of each, both ears, indefinitely.  If that does not work, come back tome.    I do not suspect eustachian tube dysfunction or middle ear disease is the issue, so no indication for tubes at this time.

## 2019-05-17 NOTE — LETTER
5/17/2019         RE: Joel Pineda  [address] [facility] [address] [place] [address]        Dear Colleague,    Thank you for referring your patient, Joel Pineda, to the [facility]. Please see a copy of my visit note below.    History of Present Illness - Joel Pineda (Mickey) is a 43 year old male last seen 8/21/2018 for RIGHT sided hear and orbital pain following dental surgery.  I felt that it was acute temporomandibular disease.  Although, he did have a history of functional endoscopic sinus surgery.  He had an ethmoidectomy in 2002, and rhinoplasty in 2010.  But no previous ear surgery.    He has come back today for ear symptoms.  He tells me that since October of 2018 he has had recurrent ear infections.  He has been on Humira for his ankylosing spondylitis.  He tells me that he has had some ear canal infections, but also thinks he has had fluid in the middle ear as well.  He has been treated for these by urgent care, PCP, and [facility] as well.    Past Medical History -   Patient Active Problem List   Diagnosis     Major depressive disorder, recurrent episode (H)     Intermittent asthma     Hyperlipidemia LDL goal <100     Chronic nonallergic rhinitis     Diverticulosis     GERD (gastroesophageal reflux disease)     Anxiety     LLOYD (obstructive sleep apnea)- mild (AHI 11)     Intracranial arachnoid cyst     Facet arthritis of cervical region (H)     Acquired hypothyroidism     Bipolar 2 disorder (H)     Chronic midline low back pain without sciatica     Irritable bowel syndrome with diarrhea     B12 deficiency     Essential hypertension with goal blood pressure less than 140/90     Chronic pain syndrome     Ankylosing spondylitis of sacral region (H)     Morbid obesity due to hypertriglyceridemia (H)     Fatty infiltration of liver     DDD (degenerative disc disease), lumbar     Type 2 diabetes mellitus with complication, without long-term current use of insulin (H)     Peripheral  polyneuropathy     History of pulmonary embolism       Current Medications -   Current Outpatient Medications:      acetaminophen 500 MG CAPS, Take 500 mg by mouth every 4 hours as needed, Disp: 60 capsule, Rfl:      adalimumab (HUMIRA *CF*) 40 MG/0.4ML pen kit, Inject 0.4 mLs (40 mg) Subcutaneous every 14 days . Hold for signs of infection, then seek medical attention., Disp: 0.8 mL, Rfl: 5     albuterol (PROAIR HFA/PROVENTIL HFA/VENTOLIN HFA) 108 (90 BASE) MCG/ACT Inhaler, Inhale 2 puffs into the lungs every 4 hours as needed, Disp: , Rfl:      ALPRAZolam (XANAX XR) 1 MG 24 hr tablet, Take 1 mg by mouth daily, Disp: , Rfl:      aspirin (ASA) 81 MG tablet, Take 81 mg by mouth 2 times daily, Disp: , Rfl:      blood glucose monitoring (NO BRAND SPECIFIED) meter device kit, Use to test blood sugar 2 times daily or as directed. Include test strips (2 boxes) with 3 refills, Disp: 1 kit, Rfl: 0     celecoxib (CELEBREX) 200 MG capsule, TAKE 1 CAPSULE BY MOUTH TWICE DAILY AS NEEDED FOR MODERATE PAIN., Disp: 180 capsule, Rfl: 1     cetirizine (ZYRTEC) 10 MG tablet, Take 1 tablet (10 mg) by mouth At Bedtime, Disp: 30 tablet, Rfl: 11     cyanocobalamin (VITAMIN B12) 1000 MCG/ML injection, Inject 1 mL (1,000 mcg) into the muscle every 30 days, Disp: 10 mL, Rfl: 0     DULoxetine (CYMBALTA) 60 MG capsule, Take 60 mg by mouth daily, Disp: , Rfl:      EPINEPHrine (EPIPEN/ADRENACLICK/OR ANY BX GENERIC EQUIV) 0.3 MG/0.3ML injection 2-pack, Inject 0.3 mLs (0.3 mg) into the muscle once as needed for anaphylaxis, Disp: 0.6 mL, Rfl: 3     Fexofenadine HCl (MUCINEX ALLERGY PO), , Disp: , Rfl:      fluticasone-salmeterol (ADVAIR DISKUS) 250-50 MCG/DOSE diskus inhaler, Inhale 1 puff into the lungs 2 times daily , Disp: , Rfl:      Ginger, Zingiber officinalis, (GINGER ROOT) 550 MG CAPS capsule, Take 550 mg by mouth Up to three times daily, Disp: , Rfl:      hydrOXYzine (ATARAX) 50 MG tablet, Take  mg by mouth as needed For anxiety  and insomnia, Disp: , Rfl:      lamoTRIgine (LAMICTAL) 100 MG tablet, Take 200 mg by mouth daily, Disp: , Rfl:      levothyroxine (SYNTHROID/LEVOTHROID) 75 MCG tablet, TAKE 1 TABLET EVERY MORNING, Disp: 90 tablet, Rfl: 1     lidocaine (XYLOCAINE) 5 % external ointment, UAD, Disp: 30 g, Rfl:      loperamide (IMODIUM) 2 MG capsule, Take 2 mg by mouth 4 times daily as needed for diarrhea, Disp: , Rfl:      metFORMIN (GLUCOPHAGE-XR) 500 MG 24 hr tablet, Take 2 tablets (1,000 mg) by mouth daily (with dinner), Disp: 180 tablet, Rfl: 3     metoprolol succinate ER (TOPROL-XL) 200 MG 24 hr tablet, Take 2 tablets (400 mg) by mouth daily, Disp: 180 tablet, Rfl: 1     montelukast (SINGULAIR) 10 MG tablet, Take 10 mg by mouth daily , Disp: , Rfl:      omega-3 acid ethyl esters (LOVAZA) 1 g capsule, TAKE 2 CAPSULES BY MOUTH TWICE DAILY., Disp: 360 capsule, Rfl: 1     omeprazole 20 MG tablet, Take 20 mg by mouth daily , Disp: , Rfl:      ondansetron (ZOFRAN) 8 MG tablet, TAKE 1 TABLET BY MOUTH EVERY 8 HOURS AS NEEDED FOR NAUSEA OR VOMITING, Disp: 20 tablet, Rfl: 4     order for DME, Respironics REMSTAR 60 Series Auto CPAP 9-13 cm H2O, Wisp nasal mask w/a large cushion and a chinstrap, Disp: , Rfl:      oxyCODONE (ROXICODONE) 5 MG tablet, Take 1-2 tablets (5-10 mg) by mouth every 6 hours as needed for pain (maximum 4 tablet(s) per day), Disp: 35 tablet, Rfl: 0     pregabalin (LYRICA) 150 MG capsule, Take 1 capsule (150 mg) by mouth 2 times daily, Disp: 60 capsule, Rfl: 11     pseudoePHEDrine (SUDAFED) 120 MG 12 hr tablet, Take 1 tablet (120 mg) by mouth every 12 hours, Disp: 28 tablet, Rfl: 0     ramipril (ALTACE) 10 MG capsule, Take 1 capsule (10 mg) by mouth daily, Disp: 90 capsule, Rfl: 1     rizatriptan (MAXALT-MLT) 5 MG ODT tab, Take 1 tablet (5 mg) by mouth at onset of headache for migraine, Disp: 30 tablet, Rfl: 5     rosuvastatin (CRESTOR) 40 MG tablet, Take 1 tablet (40 mg) by mouth daily, Disp: 90 tablet, Rfl: 2      syringe, disposable, (BD TUBERCULIN SYRINGE) 1 ML MISC, Equipment being ordered: 1 ml tuberculin syringes to be used for Vitamin B12 injections., Disp: 12 each, Rfl: 1     tiZANidine (ZANAFLEX) 4 MG tablet, TAKE 1 TABLET BY MOUTH THREE TIMES DAILY AS NEEDED, Disp: 90 tablet, Rfl: 4     vitamin C (ASCORBIC ACID) 500 MG tablet, Take 500 mg by mouth daily, Disp: , Rfl:      vitamin D3 (CHOLECALCIFEROL) 44284 UNITS capsule, Take one capsule every two weeks., Disp: 24 capsule, Rfl: 1    Allergies -   Allergies   Allergen Reactions     Amoxicillin-Pot Clavulanate Difficulty breathing     Banana Shortness Of Breath     Pt reports organic Banana is okay.      Nitroglycerin Palpitations     Penicillins Anaphylaxis     Provigil [Modafinil] Shortness Of Breath     headache     Gadolinium Hives and Itching     Patient was premedicated for the contrast allergy. He did still have a reaction a few hours after injection. Hives and itching. Dr. Gomez told tech to inform pt he should only have contrast again in the future when premedicated and at a hospital. Not at an outpatient facility.      Dye [Contrast Dye] Other (See Comments) and Hives     Moderate flushing, CT contrast     Golimumab      Hives, bradycardia, face swelling     Neurontin [Gabapentin] Hives     Moderate hives     Nortriptyline Hives     Varicella Virus Vaccine Live      Rash     Flagyl [Metronidazole Hcl] Palpitations and Hives     Latex Rash     Metronidazole Palpitations, Other (See Comments) and Rash     dizziness (versus ciprofloxacin taken at same time)     No Clinical Screening - See Comments Rash     Nitrile gloves       Social History -   Social History     Socioeconomic History     Marital status: Single     Spouse name: Not on file     Number of children: Not on file     Years of education: Not on file     Highest education level: Not on file   Occupational History     Occupation:      Employer: [employer]     Occupation:  paraprofessional     Comment: [employer]     Employer: [employer]   Social Needs     Financial resource strain: Not on file     Food insecurity:     Worry: Not on file     Inability: Not on file     Transportation needs:     Medical: Not on file     Non-medical: Not on file   Tobacco Use     Smoking status: Never Smoker     Smokeless tobacco: Never Used   Substance and Sexual Activity     Alcohol use: Yes     Alcohol/week: 0.0 oz     Drinks per session: 1 or 2     Binge frequency: Weekly     Comment: occ 1/week     Drug use: No     Sexual activity: Never     Partners: Female   Lifestyle     Physical activity:     Days per week: Not on file     Minutes per session: Not on file     Stress: Not on file   Relationships     Social connections:     Talks on phone: Not on file     Gets together: Not on file     Attends Zoroastrian service: Not on file     Active member of club or organization: Not on file     Attends meetings of clubs or organizations: Not on file     Relationship status: Not on file     Intimate partner violence:     Fear of current or ex partner: Not on file     Emotionally abused: Not on file     Physically abused: Not on file     Forced sexual activity: Not on file   Other Topics Concern     Parent/sibling w/ CABG, MI or angioplasty before 65F 55M? No      Service Not Asked     Blood Transfusions Not Asked     Caffeine Concern Not Asked     Occupational Exposure Not Asked     Hobby Hazards Not Asked     Sleep Concern Yes     Stress Concern Yes     Weight Concern Not Asked     Special Diet Not Asked     Back Care Yes     Exercise Not Asked     Bike Helmet Not Asked     Seat Belt Yes     Self-Exams Not Asked   Social History Narrative     Not on file       Family History -   Family History   Problem Relation Age of Onset     Musculoskeletal Disorder Mother         back     Anxiety Disorder Mother      Colon Polyps Mother      Ulcerative Colitis Mother         and ischemic small intestine,  "surgery     Hypertension Mother      Breast Cancer Mother      Osteoporosis Mother      Diabetes Mother         Type 2, Diagnosed in 2014     Depression Mother         Takes Cymbalta to help with chronic pain + depx     Thyroid Disease Mother         Hypothyroidism     Obesity Mother         Under much better control latter half of 2015     Musculoskeletal Disorder Father         back     Substance Abuse Father      Hypertension Father      Hyperlipidemia Father      Depression Father         Off meds for many years. Seems \"ok\"     Heart Disease Maternal Grandmother      Heart Disease Maternal Grandfather      Psychotic Disorder Paternal Grandfather      Suicide Paternal Grandfather      Depression Paternal Grandfather         Pediatrician. Committed suicide by pistol in 1990.     Musculoskeletal Disorder Brother         back     Depression Brother         Expressed as anger and moodiness     Substance Abuse Sister      Depression Sister         Mental Health Therapist, yet no anti-depressants?     Anxiety Disorder Sister         Mental Health Therapist, yet no anti-anxiety meds?     Other Cancer Other         Bladder Cancer - Fatal     Substance Abuse Brother      Colon Cancer No family hx of      Crohn's Disease No family hx of      Anesthesia Reaction No family hx of      Cancer No family hx of         No family history of skin cancer       Review of Systems - As per HPI and PMHx, otherwise 10+ system review of the head and neck, and general constitution is negative.    Physical Exam  BP (!) 145/98   Pulse 83   Resp 16   Ht 1.956 m (6' 5\")   Wt 149.7 kg (330 lb)   SpO2 96%   BMI 39.13 kg/m       General - The patient is well nourished and well developed, and appears to have good nutritional status.  Alert and oriented to person and place, answers questions and cooperates with examination appropriately.   Head and Face - Normocephalic and atraumatic, with no gross asymmetry noted of the contour of the facial " features.  The facial nerve is intact, with strong symmetric movements.  Voice and Breathing - The patient was breathing comfortably without the use of accessory muscles. There was no wheezing, stridor, or stertor.  The patients voice was clear and strong, and had appropriate pitch and quality.  Ears - The tympanic membranes are normal in appearance, bony landmarks are intact.  No retraction, perforation, or masses.  No fluid or purulence was seen in the external canal or the middle ear. No evidence of infection of the middle ear or external canal, cerumen was normal in appearance.  Eyes - Extraocular movements intact, and the pupils were reactive to light.  Sclera were not icteric or injected, conjunctiva were pink and moist.  Mouth - Examination of the oral cavity showed pink, healthy oral mucosa. No lesions or ulcerations noted.  The tongue was mobile and midline, and the dentition were in good condition.    Throat - The walls of the oropharynx were smooth, pink, moist, symmetric, and had no lesions or ulcerations.  The tonsillar pillars and soft palate were symmetric.  The uvula was midline on elevation.    Neck - Normal midline excursion of the laryngotracheal complex during swallowing.  Full range of motion on passive movement.  Palpation of the occipital, submental, submandibular, internal jugular chain, and supraclavicular nodes did not demonstrate any abnormal lymph nodes or masses.  The carotid pulse was palpable bilaterally.  Palpation of the thyroid was soft and smooth, with no nodules or goiter appreciated.  The trachea was mobile and midline.  Nose - External contour is symmetric, no gross deflection or scars.  Nasal mucosa is pink and moist with no abnormal mucus.  The septum was midline and non-obstructive, turbinates of normal size and position.  No polyps, masses, or purulence noted on examination.    Audiologic Studies - An audiogram and tympanogram were performed today as part of the evaluation  and personally reviewed. The tympanogram shows a normal Type A curve, with normal canal volume and middle ear pressure.  There is no sign of eustachian tube dysfunction or middle ear effusion.  The audiogram was also normal.  The sensorineural hearing was age-appropriate, with no evidence of conductive hearing loss or significant asymmetry.      A/P - Joel Pineda is a 43 year old male  (H60.61) Chronic otitis externa of right ear, unspecified type  (primary encounter diagnosis)    This appears to be a classic case of immunosuppression related chronic otitis externa.    I will treat with long term suppressive ototopical drops with both clotrimazole and ciprodex, twice weekly, 5 drops of each, both ears, indefinitely.  If that does not work, come back tome.    I do not suspect eustachian tube dysfunction or middle ear disease is the issue, so no indication for tubes at this time.      Again, thank you for allowing me to participate in the care of your patient.        Sincerely,        Chico Jimenez MD

## 2019-05-28 ENCOUNTER — OFFICE VISIT (OUTPATIENT)
Dept: FAMILY MEDICINE | Facility: CLINIC | Age: 44
End: 2019-05-28
Payer: MEDICARE

## 2019-05-28 VITALS
BODY MASS INDEX: 37.19 KG/M2 | TEMPERATURE: 97.7 F | OXYGEN SATURATION: 98 % | HEIGHT: 77 IN | HEART RATE: 77 BPM | RESPIRATION RATE: 22 BRPM | SYSTOLIC BLOOD PRESSURE: 110 MMHG | DIASTOLIC BLOOD PRESSURE: 80 MMHG | WEIGHT: 315 LBS

## 2019-05-28 DIAGNOSIS — D17.30 LIPOMA OF SKIN AND SUBCUTANEOUS TISSUE: ICD-10-CM

## 2019-05-28 DIAGNOSIS — M79.671 RIGHT FOOT PAIN: Primary | ICD-10-CM

## 2019-05-28 PROCEDURE — 99213 OFFICE O/P EST LOW 20 MIN: CPT | Performed by: NURSE PRACTITIONER

## 2019-05-28 ASSESSMENT — PAIN SCALES - GENERAL: PAINLEVEL: MILD PAIN (2)

## 2019-05-28 ASSESSMENT — MIFFLIN-ST. JEOR: SCORE: 2499.25

## 2019-05-28 NOTE — PROGRESS NOTES
Subjective     Joel Pineda is a 43 year old male who presents to clinic today for the following health issues:    HPI   Joint Pain    Onset: 1-2 weeks ago    Description:   Location: RT top of foot  Character: sharp pain    Intensity: mild    Progression of Symptoms: worse    Accompanying Signs & Symptoms:  Other symptoms: none    History:   Previous similar pain: no       Precipitating factors:   Trauma or overuse: no     Alleviating factors:  Improved by: nothing    Therapies Tried and outcome: tried some shoe inserts and not sure if it's helping  Pain is still there despite resting this weekend    Lipoma: noted to causing some pain now. Wondering about removal or stay with dermatology where he has had it monitored         Patient Active Problem List   Diagnosis     Major depressive disorder, recurrent episode (H)     Intermittent asthma     Hyperlipidemia LDL goal <100     Chronic nonallergic rhinitis     Diverticulosis     GERD (gastroesophageal reflux disease)     Anxiety     LLOYD (obstructive sleep apnea)- mild (AHI 11)     Intracranial arachnoid cyst     Facet arthritis of cervical region (H)     Acquired hypothyroidism     Bipolar 2 disorder (H)     Chronic midline low back pain without sciatica     Irritable bowel syndrome with diarrhea     B12 deficiency     Essential hypertension with goal blood pressure less than 140/90     Chronic pain syndrome     Ankylosing spondylitis of sacral region (H)     Morbid obesity due to hypertriglyceridemia (H)     Fatty infiltration of liver     DDD (degenerative disc disease), lumbar     Type 2 diabetes mellitus with complication, without long-term current use of insulin (H)     Peripheral polyneuropathy     History of pulmonary embolism     Past Surgical History:   Procedure Laterality Date     BACK SURGERY  10/07    lumbar discectomy L5-S1     COLONOSCOPY      Note: colonoscopy scheduled with [facility] on Friday, 9/4/15     COSMETIC SURGERY  2012    Nose Exterior -  "functional     GI SURGERY  August 2013    Sigmoidectomy     HERNIA REPAIR, UMBILICAL  8/23/11    [place], Dr. Evan Beavers     INCISION AND DRAINAGE, ABSCESS, COMPLEX  8/23/11    umbilical, Dr. Evan Beavers     LAPAROSCOPIC ASSISTED COLECTOMY LEFT (DESCENDING)  8/15/2013    Procedure: LAPAROSCOPIC ASSISTED COLECTOMY LEFT (DESCENDING);  Laparoscopic Hand Assisted Sigmoid Resection, Mobilization of Splenic Fissure, coloproctoscopy, *Latex Free Room* Anesthesia General with Pain block  ;  Surgeon: Aurora Justice MD;  Location: [facility] OR     NERVE SURGERY  8/18/11    RF ablation @ L3-S1 @ [facility]     RECONSTRUCT NOSE AND SEPTUM (FUNCTIONAL)  10/14/2011    Procedure:RECONSTRUCT NOSE AND SEPTUM (FUNCTIONAL); Functional Septorhinoplasty, Turbinate Reduction, ; Surgeon:CEDRIC CUEVAS; Location:[facility] OR     SINUS SURGERY  10/1/01    ethmoidectomy chronic sinusitis       Social History     Tobacco Use     Smoking status: Never Smoker     Smokeless tobacco: Never Used   Substance Use Topics     Alcohol use: Yes     Alcohol/week: 0.0 oz     Drinks per session: 1 or 2     Binge frequency: Weekly     Comment: occ 1/week     Family History   Problem Relation Age of Onset     Musculoskeletal Disorder Mother         back     Anxiety Disorder Mother      Colon Polyps Mother      Ulcerative Colitis Mother         and ischemic small intestine, surgery     Hypertension Mother      Breast Cancer Mother      Osteoporosis Mother      Diabetes Mother         Type 2, Diagnosed in 2014     Depression Mother         Takes Cymbalta to help with chronic pain + depx     Thyroid Disease Mother         Hypothyroidism     Obesity Mother         Under much better control latter half of 2015     Musculoskeletal Disorder Father         back     Substance Abuse Father      Hypertension Father      Hyperlipidemia Father      Depression Father         Off meds for many years. Seems \"ok\"     Heart Disease Maternal Grandmother      Heart Disease Maternal " Grandfather      Psychotic Disorder Paternal Grandfather      Suicide Paternal Grandfather      Depression Paternal Grandfather         Pediatrician. Committed suicide by pistol in 1990.     Musculoskeletal Disorder Brother         back     Depression Brother         Expressed as anger and moodiness     Substance Abuse Sister      Depression Sister         Mental Health Therapist, yet no anti-depressants?     Anxiety Disorder Sister         Mental Health Therapist, yet no anti-anxiety meds?     Other Cancer Other         Bladder Cancer - Fatal     Substance Abuse Brother      Colon Cancer No family hx of      Crohn's Disease No family hx of      Anesthesia Reaction No family hx of      Cancer No family hx of         No family history of skin cancer         Current Outpatient Medications   Medication Sig Dispense Refill     acetaminophen 500 MG CAPS Take 500 mg by mouth every 4 hours as needed 60 capsule      adalimumab (HUMIRA *CF*) 40 MG/0.4ML pen kit Inject 0.4 mLs (40 mg) Subcutaneous every 14 days . Hold for signs of infection, then seek medical attention. 0.8 mL 5     albuterol (PROAIR HFA/PROVENTIL HFA/VENTOLIN HFA) 108 (90 BASE) MCG/ACT Inhaler Inhale 2 puffs into the lungs every 4 hours as needed       ALPRAZolam (XANAX XR) 1 MG 24 hr tablet Take 1 mg by mouth daily       aspirin (ASA) 81 MG tablet Take 81 mg by mouth 2 times daily       blood glucose monitoring (NO BRAND SPECIFIED) meter device kit Use to test blood sugar 2 times daily or as directed. Include test strips (2 boxes) with 3 refills 1 kit 0     celecoxib (CELEBREX) 200 MG capsule TAKE 1 CAPSULE BY MOUTH TWICE DAILY AS NEEDED FOR MODERATE PAIN. 180 capsule 1     cetirizine (ZYRTEC) 10 MG tablet Take 1 tablet (10 mg) by mouth At Bedtime 30 tablet 11     ciprofloxacin-dexamethasone (CIPRODEX) 0.3-0.1 % otic suspension Place 5 drops into the right ear twice a week 1 Bottle 3     clotrimazole (LOTRIMIN) 1 % external solution Apply 1 mL topically 2  times daily Correct use is 5 drops in right ear twice weekly 10 mL 3     cyanocobalamin (VITAMIN B12) 1000 MCG/ML injection Inject 1 mL (1,000 mcg) into the muscle every 30 days 10 mL 0     DULoxetine (CYMBALTA) 60 MG capsule Take 60 mg by mouth daily       EPINEPHrine (EPIPEN/ADRENACLICK/OR ANY BX GENERIC EQUIV) 0.3 MG/0.3ML injection 2-pack Inject 0.3 mLs (0.3 mg) into the muscle once as needed for anaphylaxis 0.6 mL 3     Fexofenadine HCl (MUCINEX ALLERGY PO)        fluticasone-salmeterol (ADVAIR DISKUS) 250-50 MCG/DOSE diskus inhaler Inhale 1 puff into the lungs 2 times daily        Ginger, Zingiber officinalis, (GINGER ROOT) 550 MG CAPS capsule Take 550 mg by mouth Up to three times daily       hydrOXYzine (ATARAX) 50 MG tablet Take  mg by mouth as needed For anxiety and insomnia       lamoTRIgine (LAMICTAL) 100 MG tablet Take 200 mg by mouth daily       levothyroxine (SYNTHROID/LEVOTHROID) 75 MCG tablet TAKE 1 TABLET EVERY MORNING 90 tablet 1     lidocaine (XYLOCAINE) 5 % external ointment UAD 30 g      loperamide (IMODIUM) 2 MG capsule Take 2 mg by mouth 4 times daily as needed for diarrhea       metFORMIN (GLUCOPHAGE-XR) 500 MG 24 hr tablet Take 2 tablets (1,000 mg) by mouth daily (with dinner) 180 tablet 3     metoprolol succinate ER (TOPROL-XL) 200 MG 24 hr tablet Take 2 tablets (400 mg) by mouth daily 180 tablet 1     montelukast (SINGULAIR) 10 MG tablet Take 10 mg by mouth daily        omega-3 acid ethyl esters (LOVAZA) 1 g capsule TAKE 2 CAPSULES BY MOUTH TWICE DAILY. 360 capsule 1     omeprazole 20 MG tablet Take 20 mg by mouth daily        ondansetron (ZOFRAN) 8 MG tablet TAKE 1 TABLET BY MOUTH EVERY 8 HOURS AS NEEDED FOR NAUSEA OR VOMITING 20 tablet 4     [place] for [facility] REMSTAR 60 Series Auto CPAP 9-13 cm H2O, Wisp nasal mask w/a large cushion and a chinstrap       oxyCODONE (ROXICODONE) 5 MG tablet Take 1-2 tablets (5-10 mg) by mouth every 6 hours as needed for pain (maximum 4  tablet(s) per day) 35 tablet 0     pregabalin (LYRICA) 150 MG capsule Take 1 capsule (150 mg) by mouth 2 times daily 60 capsule 11     pseudoePHEDrine (SUDAFED) 120 MG 12 hr tablet Take 1 tablet (120 mg) by mouth every 12 hours 28 tablet 0     ramipril (ALTACE) 10 MG capsule Take 1 capsule (10 mg) by mouth daily 90 capsule 1     rizatriptan (MAXALT-MLT) 5 MG ODT tab Take 1 tablet (5 mg) by mouth at onset of headache for migraine 30 tablet 5     rosuvastatin (CRESTOR) 40 MG tablet Take 1 tablet (40 mg) by mouth daily 90 tablet 2     syringe, disposable, (BD TUBERCULIN SYRINGE) 1 ML MISC Equipment being ordered: 1 ml tuberculin syringes to be used for Vitamin B12 injections. 12 each 1     tiZANidine (ZANAFLEX) 4 MG tablet TAKE 1 TABLET BY MOUTH THREE TIMES DAILY AS NEEDED 90 tablet 4     vitamin C (ASCORBIC ACID) 500 MG tablet Take 500 mg by mouth daily       vitamin D3 (CHOLECALCIFEROL) 55087 UNITS capsule Take one capsule every two weeks. 24 capsule 1     Allergies   Allergen Reactions     Amoxicillin-Pot Clavulanate Difficulty breathing     Banana Shortness Of Breath     Pt reports organic Banana is okay.      Nitroglycerin Palpitations     Penicillins Anaphylaxis     Provigil [Modafinil] Shortness Of Breath     headache     Gadolinium Hives and Itching     Patient was premedicated for the contrast allergy. He did still have a reaction a few hours after injection. Hives and itching. Dr. Gomez told tech to inform pt he should only have contrast again in the future when premedicated and at a hospital. Not at an outpatient facility.      Dye [Contrast Dye] Other (See Comments) and Hives     Moderate flushing, CT contrast     Golimumab      Hives, bradycardia, face swelling     Neurontin [Gabapentin] Hives     Moderate hives     Nortriptyline Hives     Varicella Virus Vaccine Live      Rash     Flagyl [Metronidazole Hcl] Palpitations and Hives     Latex Rash     Metronidazole Palpitations, Other (See Comments)  "and Rash     dizziness (versus ciprofloxacin taken at same time)     No Clinical Screening - See Comments Rash     Nitrile gloves       Reviewed and updated as needed this visit by Provider         Review of Systems   ROS COMP: skin and MS      Objective    /80 (BP Location: Right arm, Patient Position: Sitting, Cuff Size: Adult Large)   Pulse 77   Temp 97.7  F (36.5  C) (Oral)   Resp 22   Ht 1.956 m (6' 5\")   Wt 149.7 kg (330 lb)   SpO2 98%   BMI 39.13 kg/m    Body mass index is 39.13 kg/m .  Physical Exam   GENERAL: healthy, alert and no distress  MS: no gross musculoskeletal defects noted, very minimal pain in right posterior foot with palpation  SKIN: left lower middle back, to the left of the spine small mobile lump, non-tender    Diagnostic Test Results:  Labs reviewed in Epic        Assessment & Plan     1. Right foot pain  Having right posterior foot pain. Numbness he has had but it is worsening as is the pain. Did minimal activity this weekend, and foot pain still flared. He will try topical IcyHot/bengay, rolling foot on tennis ball, stretching. If not improving follow up with Podiatry, potential need orthotics? May be related to immune deficiency?  - PODIATRY/FOOT & ANKLE SURGERY REFERRAL    2. Lipoma of skin and subcutaneous tissue  [facility] with [facility] as it is causing discomfort. Still a few inches from spine, should be fine being removed there. If they want an US to confirm lipoma can let NP know, can order       BMI:   Estimated body mass index is 39.13 kg/m  as calculated from the following:    Height as of this encounter: 1.956 m (6' 5\").    Weight as of this encounter: 149.7 kg (330 lb).   Weight management plan: did not discuss today          Return in about 1 month (around 6/25/2019), or if symptoms worsen or fail to improve.    JOCELYN Pro, NP-C  [facility]      "

## 2019-05-29 ENCOUNTER — OFFICE VISIT (OUTPATIENT)
Dept: RHEUMATOLOGY | Facility: CLINIC | Age: 44
End: 2019-05-29
Payer: MEDICARE

## 2019-05-29 VITALS
BODY MASS INDEX: 39.44 KG/M2 | HEART RATE: 82 BPM | TEMPERATURE: 96.6 F | DIASTOLIC BLOOD PRESSURE: 76 MMHG | OXYGEN SATURATION: 96 % | SYSTOLIC BLOOD PRESSURE: 130 MMHG | WEIGHT: 315 LBS

## 2019-05-29 DIAGNOSIS — Z79.899 HIGH RISK MEDICATION USE: ICD-10-CM

## 2019-05-29 DIAGNOSIS — M45.8 ANKYLOSING SPONDYLITIS OF SACRAL REGION (H): Primary | ICD-10-CM

## 2019-05-29 PROCEDURE — 99213 OFFICE O/P EST LOW 20 MIN: CPT | Performed by: INTERNAL MEDICINE

## 2019-05-29 ASSESSMENT — ASTHMA QUESTIONNAIRES: ACT_TOTALSCORE: 22

## 2019-05-29 NOTE — PROGRESS NOTES
"Rheumatology Clinic Visit      Joel Pineda MRN# [id number]   YOB: 1975 Age: 43 year old      Date of visit: 5/29/19   PCP: Dr. Ksenia Lyles    Chief Complaint   Patient presents with:  RECHECK      Assessment and Plan     1.  Ankylosing spondylitis: Many years of inflammatory back pain but no clear sacroiliitis seen on x-rays or MRIs; then had a lumbar spine x-ray on 2/23/2018 at [facility] showing \"Moderate L5-S1 and mild L4-5 degenerative disc disease and degenerative facet arthropathy. No evidence for fracture, subluxation, or spondylolisthesis. Chronic bridging enthesophyte across the lower right SI joint with irregularity of the joint space suspicious for sequelae of chronic inflammatory sacroiliitis. Correlate clinically.\"  This is complicated by a history of back surgery and degenerative changes.  HLA-B27 positive per record review but the actual lab report has not been seen.  He has a personal history of diverticulitis requiring sigmoidectomy.  Reportedly his mother has ulcerative colitis. Based on his symptoms, the x-ray results, and HLA-B27 positive, it is most likely ankylosing spondylitis and Remicade was started with improvement of lower back pain and resolution of lower back stiffness. However, Remicade was also associated with increased infections so it was changed to Simponi; Simponi was associated with hives, nausea, dizziness, and drowsiness.  Then on Humira but he associated this with increased fatigue; he held Humira with no change in his fatigue but worsening lower back pain and stiffness.  Also with pain on the plantar aspect of his bilateral MTPs that could be related to peripheral arthritis and/or plantar fasciitis; normal exam today; we reviewed the stretching exercises for plantar fasciitis. He is going to restart Humira now.  - Restart Humira 40mg SQ every 14 days    2. BMI 39.57, obesity; reported hx of fatty liver disease; elevated LFT: encouraged weight loss and discussed " the health benefit    3.  Vaccinations: Vaccinations reviewed with Mr. Pineda.  Risks and benefits of vaccinations were discussed.  CDC stance on shingrix when on moderate to high immunosuppression reviewed.  I explained that Shingrix is used off label when under 50 years old and that the safety and efficacy of the vaccine has not been tested in people younger than 50 years old.   - Influenza: encouraged yearly vaccination  - [id number]: up to date  - [id number]: up to date  - Shingrix: Rx called to the [facility] in [place] (called by ESTEFANI De Jesus)    Mr. Pineda verbalized agreement with and understanding of the rational for the diagnosis and treatment plan.  All questions were answered to best of my ability and the patient's satisfaction. Mr. Pineda was advised to contact the clinic with any questions that may arise after the clinic visit.      Thank you for involving me in the care of the patient    Return to clinic: 4-5 months      HPI   Joel Pineda is a 43 year old male with a past medical history significant for hypertension, hyperlipidemia, asthma, history of pulmonary embolism, history of diverticulitis requiring sigmoidectomy, GERD, obstructive sleep apnea, bipolar disorder, hypothyroidism, B12 deficiency, CKD? (reportedly issue with contrast) and chronic pain syndrome who presents for follow-up of ankylosing spondylitis.    Today, Mr. Pineda reports that he had fatigue that he is not sure if it was really associated to Humira or not.  He held Humira that resulted in worsening lower back pain, bilateral foot pain on the plantar aspect under the MTPs, and increased morning stiffness.  Currently with morning stiffness for 45-60 minutes.  Stiffness improves with activity and time; worsens with inactivity.  Diagnosed with possible plantar fasciitis by his PCP and is using orthotics that he had made for himself many years ago.  He plans to restart Humira.  Also using prophylactic eardrops from Dr. Jimenez, ENT.      Denies fevers, chills, nausea, vomiting, constipation, diarrhea. No abdominal pain. No chest pain/pressure, palpitations, or shortness of breath. No LE swelling. No neck pain. No oral or nasal sores.  No rash. No sicca symptoms. No photosensitivity or photophobia. No eye pain or redness. No history of inflammatory eye disease.  No history of Raynaud's Phenomenon.  No seizure history.  No known renal disorder.      Mother: ulcerative colitis    Tobacco: None  EtOH: None  Drugs: None    ROS   GEN: No fevers, chills, night sweats, or weight change  SKIN: No itching, rashes, sores  HEENT: No oral or nasal ulcers.  CV: No chest pain, pressure, palpitations, or dyspnea on exertion.  PULM: No SOB, wheeze, cough.  GI: No nausea, vomiting, constipation, diarrhea. No blood in stool. No abdominal pain.  : No blood in urine.  MSK: See HPI.  NEURO: See HPI  EXT: No LE swelling  PSYCH: See HPI    Active Problem List     Patient Active Problem List   Diagnosis     Major depressive disorder, recurrent episode (H)     Intermittent asthma     Hyperlipidemia LDL goal <100     Chronic nonallergic rhinitis     Diverticulosis     GERD (gastroesophageal reflux disease)     Anxiety     LLOYD (obstructive sleep apnea)- mild (AHI 11)     Intracranial arachnoid cyst     Facet arthritis of cervical region (H)     Acquired hypothyroidism     Bipolar 2 disorder (H)     Chronic midline low back pain without sciatica     Irritable bowel syndrome with diarrhea     B12 deficiency     Essential hypertension with goal blood pressure less than 140/90     Chronic pain syndrome     Ankylosing spondylitis of sacral region (H)     Morbid obesity due to hypertriglyceridemia (H)     Fatty infiltration of liver     DDD (degenerative disc disease), lumbar     Type 2 diabetes mellitus with complication, without long-term current use of insulin (H)     Peripheral polyneuropathy     History of pulmonary embolism     Past Medical History     Past Medical  History:   Diagnosis Date     Acne      Allergic state      Anxiety      Bipolar 2 disorder (H)      Chest pain     Chest pain, regulated w/BP meds. Clear arteries.     Chronic pain      Depressive disorder      Diabetes (H)      Diverticulosis      Gastroesophageal reflux disease      Hypertension      IBS (irritable bowel syndrome)      Intracranial arachnoid cyst      Polyneuropathy      Pulmonary embolism (H)      Skin exam, screening for cancer 12/3/2013     Sleep apnea      Uncomplicated asthma      Past Surgical History     Past Surgical History:   Procedure Laterality Date     BACK SURGERY  10/07    lumbar discectomy L5-S1     COLONOSCOPY      Note: colonoscopy scheduled with P on Friday, 9/4/15     COSMETIC SURGERY  2012    Nose Exterior - functional     GI SURGERY  August 2013    Sigmoidectomy     HERNIA REPAIR, UMBILICAL  8/23/11    Dr. Evan whiting     INCISION AND DRAINAGE, ABSCESS, COMPLEX  8/23/11    umbilical, Dr. Evan Beavers     LAPAROSCOPIC ASSISTED COLECTOMY LEFT (DESCENDING)  8/15/2013    Procedure: LAPAROSCOPIC ASSISTED COLECTOMY LEFT (DESCENDING);  Laparoscopic Hand Assisted Sigmoid Resection, Mobilization of Splenic Fissure, coloproctoscopy, *Latex Free Room* Anesthesia General with Pain block  ;  Surgeon: Aurora Justice MD;  Location: [facility] OR     NERVE SURGERY  8/18/11    RF ablation @ L3-S1 @ [facility]     RECONSTRUCT NOSE AND SEPTUM (FUNCTIONAL)  10/14/2011    Procedure:RECONSTRUCT NOSE AND SEPTUM (FUNCTIONAL); Functional Septorhinoplasty, Turbinate Reduction, ; Surgeon:CEDRIC CUEVAS; Location:[facility] OR     SINUS SURGERY  10/1/01    ethmoidectomy chronic sinusitis     Allergy     Allergies   Allergen Reactions     Amoxicillin-Pot Clavulanate Difficulty breathing     Banana Shortness Of Breath     Pt reports organic Banana is okay.      Nitroglycerin Palpitations     Penicillins Anaphylaxis     Provigil [Modafinil] Shortness Of Breath     headache     Gadolinium Hives and Itching      Patient was premedicated for the contrast allergy. He did still have a reaction a few hours after injection. Hives and itching. Dr. Gomez told tech to inform pt he should only have contrast again in the future when premedicated and at a hospital. Not at an outpatient facility.      Dye [Contrast Dye] Other (See Comments) and Hives     Moderate flushing, CT contrast     Golimumab      Hives, bradycardia, face swelling     Neurontin [Gabapentin] Hives     Moderate hives     Nortriptyline Hives     Varicella Virus Vaccine Live      Rash     Flagyl [Metronidazole Hcl] Palpitations and Hives     Latex Rash     Metronidazole Palpitations, Other (See Comments) and Rash     dizziness (versus ciprofloxacin taken at same time)     No Clinical Screening - See Comments Rash     Nitrile gloves     Current Medication List     Current Outpatient Medications   Medication Sig     acetaminophen 500 MG CAPS Take 500 mg by mouth every 4 hours as needed     adalimumab (HUMIRA *CF*) 40 MG/0.4ML pen kit Inject 0.4 mLs (40 mg) Subcutaneous every 14 days . Hold for signs of infection, then seek medical attention.     albuterol (PROAIR HFA/PROVENTIL HFA/VENTOLIN HFA) 108 (90 BASE) MCG/ACT Inhaler Inhale 2 puffs into the lungs every 4 hours as needed     ALPRAZolam (XANAX XR) 1 MG 24 hr tablet Take 1 mg by mouth daily     aspirin (ASA) 81 MG tablet Take 81 mg by mouth 2 times daily     blood glucose monitoring (NO BRAND SPECIFIED) meter device kit Use to test blood sugar 2 times daily or as directed. Include test strips (2 boxes) with 3 refills     celecoxib (CELEBREX) 200 MG capsule TAKE 1 CAPSULE BY MOUTH TWICE DAILY AS NEEDED FOR MODERATE PAIN.     cetirizine (ZYRTEC) 10 MG tablet Take 1 tablet (10 mg) by mouth At Bedtime     ciprofloxacin-dexamethasone (CIPRODEX) 0.3-0.1 % otic suspension Place 5 drops into the right ear twice a week     clotrimazole (LOTRIMIN) 1 % external solution Apply 1 mL topically 2 times daily Correct use is  5 drops in right ear twice weekly     cyanocobalamin (VITAMIN B12) 1000 MCG/ML injection Inject 1 mL (1,000 mcg) into the muscle every 30 days     DULoxetine (CYMBALTA) 60 MG capsule Take 60 mg by mouth daily     EPINEPHrine (EPIPEN/ADRENACLICK/OR ANY BX GENERIC EQUIV) 0.3 MG/0.3ML injection 2-pack Inject 0.3 mLs (0.3 mg) into the muscle once as needed for anaphylaxis     fluticasone-salmeterol (ADVAIR DISKUS) 250-50 MCG/DOSE diskus inhaler Inhale 1 puff into the lungs 2 times daily      Ginger, Zingiber officinalis, (GINGER ROOT) 550 MG CAPS capsule Take 550 mg by mouth Up to three times daily     hydrOXYzine (ATARAX) 50 MG tablet Take  mg by mouth as needed For anxiety and insomnia     lamoTRIgine (LAMICTAL) 100 MG tablet Take 200 mg by mouth daily     levothyroxine (SYNTHROID/LEVOTHROID) 75 MCG tablet TAKE 1 TABLET EVERY MORNING     lidocaine (XYLOCAINE) 5 % external ointment UAD     loperamide (IMODIUM) 2 MG capsule Take 2 mg by mouth 4 times daily as needed for diarrhea     metFORMIN (GLUCOPHAGE-XR) 500 MG 24 hr tablet Take 2 tablets (1,000 mg) by mouth daily (with dinner)     metoprolol succinate ER (TOPROL-XL) 200 MG 24 hr tablet Take 2 tablets (400 mg) by mouth daily     montelukast (SINGULAIR) 10 MG tablet Take 10 mg by mouth daily      omega-3 acid ethyl esters (LOVAZA) 1 g capsule TAKE 2 CAPSULES BY MOUTH TWICE DAILY.     omeprazole 20 MG tablet Take 20 mg by mouth daily      ondansetron (ZOFRAN) 8 MG tablet TAKE 1 TABLET BY MOUTH EVERY 8 HOURS AS NEEDED FOR NAUSEA OR VOMITING     order for [facility] Respironics REMSTAR 60 Series Auto CPAP 9-13 cm H2O, Wisp nasal mask w/a large cushion and a chinstrap     oxyCODONE (ROXICODONE) 5 MG tablet Take 1-2 tablets (5-10 mg) by mouth every 6 hours as needed for pain (maximum 4 tablet(s) per day)     pregabalin (LYRICA) 150 MG capsule Take 1 capsule (150 mg) by mouth 2 times daily     pseudoePHEDrine (SUDAFED) 120 MG 12 hr tablet Take 1 tablet (120 mg) by mouth  "every 12 hours     ramipril (ALTACE) 10 MG capsule Take 1 capsule (10 mg) by mouth daily     rosuvastatin (CRESTOR) 40 MG tablet Take 1 tablet (40 mg) by mouth daily     syringe, disposable, (BD TUBERCULIN SYRINGE) 1 ML MISC Equipment being ordered: 1 ml tuberculin syringes to be used for Vitamin B12 injections.     tiZANidine (ZANAFLEX) 4 MG tablet TAKE 1 TABLET BY MOUTH THREE TIMES DAILY AS NEEDED     vitamin C (ASCORBIC ACID) 500 MG tablet Take 500 mg by mouth daily     vitamin D3 (CHOLECALCIFEROL) 97921 UNITS capsule Take one capsule every two weeks.     Fexofenadine HCl (MUCINEX ALLERGY PO)      rizatriptan (MAXALT-MLT) 5 MG ODT tab Take 1 tablet (5 mg) by mouth at onset of headache for migraine (Patient not taking: Reported on 5/29/2019)     No current facility-administered medications for this visit.          Social History   See HPI    Family History     Family History   Problem Relation Age of Onset     Musculoskeletal Disorder Mother         back     Anxiety Disorder Mother      Colon Polyps Mother      Ulcerative Colitis Mother         and ischemic small intestine, surgery     Hypertension Mother      Breast Cancer Mother      Osteoporosis Mother      Diabetes Mother         Type 2, Diagnosed in 2014     Depression Mother         Takes Cymbalta to help with chronic pain + depx     Thyroid Disease Mother         Hypothyroidism     Obesity Mother         Under much better control latter half of 2015     Musculoskeletal Disorder Father         back     Substance Abuse Father      Hypertension Father      Hyperlipidemia Father      Depression Father         Off meds for many years. Seems \"ok\"     Heart Disease Maternal Grandmother      Heart Disease Maternal Grandfather      Psychotic Disorder Paternal Grandfather      Suicide Paternal Grandfather      Depression Paternal Grandfather         Pediatrician. Committed suicide by pistol in 1990.     Musculoskeletal Disorder Brother         back     Depression " "Brother         Expressed as anger and moodiness     Substance Abuse Sister      Depression Sister         Mental Health Therapist, yet no anti-depressants?     Anxiety Disorder Sister         Mental Health Therapist, yet no anti-anxiety meds?     Other Cancer Other         Bladder Cancer - Fatal     Substance Abuse Brother      Colon Cancer No family hx of      Crohn's Disease No family hx of      Anesthesia Reaction No family hx of      Cancer No family hx of         No family history of skin cancer     Physical Exam     Temp Readings from Last 3 Encounters:   05/29/19 96.6  F (35.9  C) (Oral)   05/28/19 97.7  F (36.5  C) (Oral)   05/08/19 97.6  F (36.4  C) (Oral)     BP Readings from Last 5 Encounters:   05/29/19 130/76   05/28/19 110/80   05/17/19 (!) 145/98   05/08/19 137/89   05/03/19 109/75     Pulse Readings from Last 1 Encounters:   05/29/19 82     Resp Readings from Last 1 Encounters:   05/28/19 22     Estimated body mass index is 39.44 kg/m  as calculated from the following:    Height as of 5/28/19: 1.956 m (6' 5\").    Weight as of this encounter: 150.9 kg (332 lb 9.6 oz).    GEN: NAD   HEENT: MMM. No oral lesions. Anicteric, noninjected sclera  CV: S1, S2. RRR. No m/r/g.  PULM: CTA bilaterally. No w/c.  MSK: MCPs, PIPs, DIPs, wrists, elbows, shoulders, knees, ankles, and MTPs without swelling or tenderness to palpation.  Hips nontender to direct palpation.  No dactylitis.  Achilles tendons nontender to palpation.    NEURO: UE and LE strengths 5/5 and equal bilaterally.   SKIN: No rash.  No nail pitting.  EXT: No LE edema  PSYCH: Alert. Appropriate.     Labs / Imaging (select studies)   RF/CCP  Recent Labs   Lab Test 08/07/15  0846 09/03/14  1232   RHF <20 <20     CBC  Recent Labs   Lab Test 04/01/19  1540 01/03/19  0844 09/19/18  1512 05/31/18  0951   WBC 10.3 8.6 10.5 10.6   RBC 5.32 5.24 5.04 4.89   HGB 16.0 15.8 15.3 15.0   HCT 46.8 46.8 44.9 44.0   MCV 88 89 89 90   RDW 13.4 13.4 14.3 13.9    " 244 239 259   MCH 30.1 30.2 30.4 30.7   MCHC 34.2 33.8 34.1 34.1   NEUTROPHIL 42.2  --  44.3 47.1   LYMPH 42.6  --  40.4 35.9   MONOCYTE 12.3  --  12.1 13.2   EOSINOPHIL 2.1  --  2.3 2.8   BASOPHIL 0.8  --  0.9 1.0   ANEU 4.3  --  4.7 5.0   ALYM 4.4  --  4.2 3.8   KATIE 1.3  --  1.3 1.4*   AEOS 0.2  --  0.2 0.3   ABAS 0.1  --  0.1 0.1     CMP  Recent Labs   Lab Test 04/01/19  1540 03/19/19  0807 01/03/19  0844    140 138   POTASSIUM 4.6 4.3 4.1   CHLORIDE 108 106 104   CO2 28 27 25   ANIONGAP 5 7 9   * 110* 110*   BUN 13 12 12   CR 0.93 1.00 0.90   GFRESTIMATED >90 >90 >90   GFRESTBLACK >90 >90 >90   ALYCE 9.4 9.5 9.6   BILITOTAL 0.4 0.4 0.5   ALBUMIN 4.3 4.3 4.3   PROTTOTAL 7.5 7.4 7.9   ALKPHOS 111 97 106   AST 36 38 73*   ALT 42 51 67     Calcium/VitaminD  Recent Labs   Lab Test 04/01/19  1540 03/19/19  0807 01/03/19  0844  05/03/17  1456  02/08/16  1555  09/26/11  0822   ALYCE 9.4 9.5 9.6   < > 9.5   < >  --    < > 9.4   D3VIT  --   --   --   --   --   --   --   --  38   VITDT  --   --  38  --  30  --  34   < >  --     < > = values in this interval not displayed.     ESR/CRP  Recent Labs   Lab Test 04/01/19  1540 02/13/16  1510 01/04/16  1403   SED 4 6 5   CRP <2.9 3.4 <2.9     Hepatitis B  Recent Labs   Lab Test 02/28/18  1157 01/06/15  1028   HBCAB Nonreactive  --    HEPBANG Nonreactive Nonreactive     Hepatitis C  Recent Labs   Lab Test 02/28/18  1157 01/06/15  1028   HCVAB Nonreactive Nonreactive   Assay performance characteristics have not been established for newborns,   infants, and children       Lyme ab screening  Recent Labs   Lab Test 02/22/19  1457   LYMEGM 0.02     Tuberculosis Screening  Recent Labs   Lab Test 02/28/18  1157 01/06/15  1028   TBRSLT Negative Negative   TBAGN 0.00 0.00       Immunization History     Immunization History   Administered Date(s) Administered     FLU 6-35 months 01/03/2019     Influenza (IIV3) PF 10/15/2008, 08/21/2012, 09/09/2013     Influenza Vaccine IM 3yrs+ 4  Valent IIV4 10/02/2015, 10/11/2016, 09/07/2018     Pneumo Conj 13-V (2010&after) 10/02/2015     Pneumococcal 23 valent 10/11/2016     TD (ADULT, 7+) 10/04/2002     TDAP Vaccine (Adacel) 07/16/2010          Chart documentation done in part with Dragon Voice recognition Software. Although reviewed after completion, some word and grammatical error may remain.    Oneil Baxter MD

## 2019-05-29 NOTE — PROGRESS NOTES
Called [facility] pharmacy in [place] and left message on provider line relaying verbal order for 2 doses of shingrix  by 2-6 months per Dr. Baxter.    Neal Amanda RN....5/29/2019 1:52 PM

## 2019-05-29 NOTE — NURSING NOTE
RAPID3 (0-30) Cumulative Score  12          RAPID3 Weighted Score (divide #4 by 3 and that is the weighted score)  4.76

## 2019-05-29 NOTE — PATIENT INSTRUCTIONS
Rheumatology    Dr. Oneil Baxter         [facility]   (Monday)  [address]  [place] [address]       [facility]   (Tuesday)  [address]  [place] [address]    [facility]   (Wed., Thurs., and Friday)  [address] [place] [address]    Phone number: [phone]  Thank you for choosing [facility].  Mackenzie Cavazos CMA

## 2019-05-31 ENCOUNTER — TELEPHONE (OUTPATIENT)
Dept: FAMILY MEDICINE | Facility: CLINIC | Age: 44
End: 2019-05-31

## 2019-06-04 ENCOUNTER — OFFICE VISIT (OUTPATIENT)
Dept: PULMONOLOGY | Facility: CLINIC | Age: 44
End: 2019-06-04
Payer: MEDICARE

## 2019-06-04 ENCOUNTER — MYC REFILL (OUTPATIENT)
Dept: FAMILY MEDICINE | Facility: CLINIC | Age: 44
End: 2019-06-04

## 2019-06-04 VITALS
OXYGEN SATURATION: 97 % | SYSTOLIC BLOOD PRESSURE: 112 MMHG | DIASTOLIC BLOOD PRESSURE: 77 MMHG | HEART RATE: 88 BPM | BODY MASS INDEX: 38.72 KG/M2 | WEIGHT: 315 LBS

## 2019-06-04 DIAGNOSIS — J45.30 MILD PERSISTENT ASTHMA, UNSPECIFIED WHETHER COMPLICATED: Primary | ICD-10-CM

## 2019-06-04 DIAGNOSIS — J31.0 CHRONIC NONALLERGIC RHINITIS: Chronic | ICD-10-CM

## 2019-06-04 DIAGNOSIS — E78.1 HYPERTRIGLYCERIDEMIA: ICD-10-CM

## 2019-06-04 PROCEDURE — 99213 OFFICE O/P EST LOW 20 MIN: CPT | Performed by: INTERNAL MEDICINE

## 2019-06-04 RX ORDER — ALBUTEROL SULFATE 90 UG/1
2 AEROSOL, METERED RESPIRATORY (INHALATION) EVERY 4 HOURS PRN
Qty: 8.5 G | Refills: 11 | Status: SHIPPED | OUTPATIENT
Start: 2019-06-04 | End: 2020-07-23

## 2019-06-04 ASSESSMENT — PAIN SCALES - GENERAL: PAINLEVEL: MILD PAIN (3)

## 2019-06-04 NOTE — TELEPHONE ENCOUNTER
"Requested Prescriptions   Pending Prescriptions Disp Refills     omega-3 acid ethyl esters (LOVAZA) 1 g capsule  Last Written Prescription Date:  3/20/19  Last Fill Quantity: 360 capsule,  # refills: 1   Last office visit: 5/28/2019 with prescribing provider:  Dr. Lyles   Future Office Visit:   Next 5 appointments (look out 90 days)    Jun 12, 2019 10:00 AM CDT  Kal Bundy with Ksenia Lyles MD  [facility] ([address] [address] [phone]          360 capsule 1     Sig: TAKE 2 CAPSULES BY MOUTH TWICE DAILY.       Antihyperlipidemic agents Passed - 6/4/2019  2:11 PM        Passed - Lipid panel on file in past 12 mos     Recent Labs   Lab Test 03/19/19  0807  12/03/14  0958   CHOL 129   < > 189   TRIG 583*   < > 487*   HDL 28*   < > 27*   LDL Cannot estimate LDL when triglyceride exceeds 400 mg/dL  53   < > Cannot estimate LDL when triglyceride exceeds 400 mg/dL  108   NHDL 101   < >  --    VLDL  --   --  Cannot estimate VLDL when triglyceride exceeds 400 mg/dL.   CHOLHDLRATIO  --   --  7.0*    < > = values in this interval not displayed.               Passed - Normal serum ALT on record in past 12 mos     Recent Labs   Lab Test 04/01/19  1540   ALT 42             Passed - Recent (12 mo) or future (30 days) visit within the authorizing provider's specialty     Patient had office visit in the last 12 months or has a visit in the next 30 days with authorizing provider or within the authorizing provider's specialty.  See \"Patient Info\" tab in inbasket, or \"Choose Columns\" in Meds & Orders section of the refill encounter.              Passed - Medication is active on med list        Passed - Patient is age 18 years or older          "

## 2019-06-04 NOTE — NURSING NOTE
Joel Pineda's goals for this visit include:   Chief Complaint   Patient presents with     Consult     Asthma f/u       He requests these members of his care team be copied on today's visit information: Yes    PCP: Ksenia Lyles    Referring Provider:  No referring provider defined for this encounter.    /77 (BP Location: Left arm, Patient Position: Sitting, Cuff Size: Adult Large)   Pulse 88   Wt 148.1 kg (326 lb 8 oz)   SpO2 97%   BMI 38.72 kg/m      Do you need any medication refills at today's visit? No

## 2019-06-04 NOTE — PROGRESS NOTES
Pulmonary Clinic Return Visit  History of Present Illness    Mr. Alberto is a 43-year-old male with a history of ankylosing spondylitis on Humira, acid reflux, allergies, and asthma who presents to pulmonary clinic today for further follow-up of asthma.  He is previously followed by my colleague Dr. Barker and was last seen in February 2019.  At this time he had completed 2-year surveillance for bilateral pulmonary nodules with calcified lymph nodes all of which were stable and no further follow-up was recommended.  His asthma had been decently well controlled on Advair 250/50, Singulair, and albuterol as needed.  Pulmonary function testing from January 2019 was normal.    Mr. Alberto returns to clinic today overall doing well.  He tells me he was diagnosed with exercise-induced asthma in 1990.  On the whole his asthma is been well controlled until he was started on biologic therapy for his ankylosing spondylitis.  Remicade clearly worsened his asthma and  this was subsequently stopped.  He was started on Humira in April.  He has not had any issues with worsening of his asthma.  He denies any new or worsening shortness of breath, or cough.  He continues to take Zyrtec daily for allergies.  He has not required prednisone for acute asthma exacerbation and had an episode of pneumonia since we last visited.  His albuterol inhaler available for as needed use and uses this about once per 3 weeks.  He wonders whether or not he would be able to de-escalate any of his medications today.    He is a never smoker.      Review of Systems:  10 of 14 systems reviewed and are negative unless otherwise stated in HPI.    Past Medical History:   Diagnosis Date     Acne      Allergic state      Anxiety      Bipolar 2 disorder (H)      Chest pain     Chest pain, regulated w/BP meds. Clear arteries.     Chronic pain      Depressive disorder      Diabetes (H)      Diverticulosis      Gastroesophageal reflux disease      Hypertension      IBS  (irritable bowel syndrome)      Intracranial arachnoid cyst      Polyneuropathy      Pulmonary embolism (H)      Skin exam, screening for cancer 12/3/2013     Sleep apnea      Uncomplicated asthma        Past Surgical History:   Procedure Laterality Date     BACK SURGERY  10/07    lumbar discectomy L5-S1     COLONOSCOPY      Note: colonoscopy scheduled with [facility] on Friday, 9/4/15     COSMETIC SURGERY  2012    Nose Exterior - functional     GI SURGERY  August 2013    Sigmoidectomy     HERNIA REPAIR, UMBILICAL  8/23/11    [place], Dr. Evan Beavers     INCISION AND DRAINAGE, ABSCESS, COMPLEX  8/23/11    umbilical, Dr. Evan Beavers     LAPAROSCOPIC ASSISTED COLECTOMY LEFT (DESCENDING)  8/15/2013    Procedure: LAPAROSCOPIC ASSISTED COLECTOMY LEFT (DESCENDING);  Laparoscopic Hand Assisted Sigmoid Resection, Mobilization of Splenic Fissure, coloproctoscopy, *Latex Free Room* Anesthesia General with Pain block  ;  Surgeon: Aurora Justice MD;  Location: [facility] OR     NERVE SURGERY  8/18/11    RF ablation @ L3-S1 @ [facility]     RECONSTRUCT NOSE AND SEPTUM (FUNCTIONAL)  10/14/2011    Procedure:RECONSTRUCT NOSE AND SEPTUM (FUNCTIONAL); Functional Septorhinoplasty, Turbinate Reduction, ; Surgeon:CEDRIC CUEVAS; Location:[facility] OR     SINUS SURGERY  10/1/01    ethmoidectomy chronic sinusitis       Family History   Problem Relation Age of Onset     Musculoskeletal Disorder Mother         back     Anxiety Disorder Mother      Colon Polyps Mother      Ulcerative Colitis Mother         and ischemic small intestine, surgery     Hypertension Mother      Breast Cancer Mother      Osteoporosis Mother      Diabetes Mother         Type 2, Diagnosed in 2014     Depression Mother         Takes Cymbalta to help with chronic pain + depx     Thyroid Disease Mother         Hypothyroidism     Obesity Mother         Under much better control latter half of 2015     Musculoskeletal Disorder Father         back     Substance Abuse Father      Hypertension  "Father      Hyperlipidemia Father      Depression Father         Off meds for many years. Seems \"ok\"     Heart Disease Maternal Grandmother      Heart Disease Maternal Grandfather      Psychotic Disorder Paternal Grandfather      Suicide Paternal Grandfather      Depression Paternal Grandfather         Pediatrician. Committed suicide by pistol in 1990.     Musculoskeletal Disorder Brother         back     Depression Brother         Expressed as anger and moodiness     Substance Abuse Sister      Depression Sister         Mental Health Therapist, yet no anti-depressants?     Anxiety Disorder Sister         Mental Health Therapist, yet no anti-anxiety meds?     Other Cancer Other         Bladder Cancer - Fatal     Substance Abuse Brother      Colon Cancer No family hx of      Crohn's Disease No family hx of      Anesthesia Reaction No family hx of      Cancer No family hx of         No family history of skin cancer       Social History     Socioeconomic History     Marital status: Single     Spouse name: None     Number of children: None     Years of education: None     Highest education level: None   Occupational History     Occupation:      Employer: [employer]     Occupation: paraprofessional     Comment: [employer]     Employer: [employer]   Social Needs     Financial resource strain: None     Food insecurity:     Worry: None     Inability: None     Transportation needs:     Medical: None     Non-medical: None   Tobacco Use     Smoking status: Never Smoker     Smokeless tobacco: Never Used   Substance and Sexual Activity     Alcohol use: Yes     Alcohol/week: 0.0 oz     Drinks per session: 1 or 2     Binge frequency: Weekly     Comment: occ 1/week     Drug use: No     Sexual activity: Never     Partners: Female   Lifestyle     Physical activity:     Days per week: None     Minutes per session: None     Stress: None   Relationships     Social connections:     Talks on phone: None     Gets " together: None     Attends Anabaptist service: None     Active member of club or organization: None     Attends meetings of clubs or organizations: None     Relationship status: None     Intimate partner violence:     Fear of current or ex partner: None     Emotionally abused: None     Physically abused: None     Forced sexual activity: None   Other Topics Concern     Parent/sibling w/ CABG, MI or angioplasty before 65F 55M? No      Service Not Asked     Blood Transfusions Not Asked     Caffeine Concern Not Asked     Occupational Exposure Not Asked     Hobby Hazards Not Asked     Sleep Concern Yes     Stress Concern Yes     Weight Concern Not Asked     Special Diet Not Asked     Back Care Yes     Exercise Not Asked     Bike Helmet Not Asked     Seat Belt Yes     Self-Exams Not Asked   Social History Narrative     None         Allergies   Allergen Reactions     Amoxicillin-Pot Clavulanate Difficulty breathing     Banana Shortness Of Breath     Pt reports organic Banana is okay.      Nitroglycerin Palpitations     Penicillins Anaphylaxis     Provigil [Modafinil] Shortness Of Breath     headache     Gadolinium Hives and Itching     Patient was premedicated for the contrast allergy. He did still have a reaction a few hours after injection. Hives and itching. Dr. Gomez told tech to inform pt he should only have contrast again in the future when premedicated and at a hospital. Not at an outpatient facility.      Dye [Contrast Dye] Other (See Comments) and Hives     Moderate flushing, CT contrast     Golimumab      Hives, bradycardia, face swelling     Neurontin [Gabapentin] Hives     Moderate hives     Nortriptyline Hives     Varicella Virus Vaccine Live      Rash     Flagyl [Metronidazole Hcl] Palpitations and Hives     Latex Rash     Metronidazole Palpitations, Other (See Comments) and Rash     dizziness (versus ciprofloxacin taken at same time)     No Clinical Screening - See Comments Rash     Nitrile  gloves         Current Outpatient Medications:      acetaminophen 500 MG CAPS, Take 500 mg by mouth every 4 hours as needed, Disp: 60 capsule, Rfl:      adalimumab (HUMIRA *CF*) 40 MG/0.4ML pen kit, Inject 0.4 mLs (40 mg) Subcutaneous every 14 days . Hold for signs of infection, then seek medical attention., Disp: 0.8 mL, Rfl: 5     albuterol (PROAIR HFA/PROVENTIL HFA/VENTOLIN HFA) 108 (90 Base) MCG/ACT inhaler, Inhale 2 puffs into the lungs every 4 hours as needed for shortness of breath / dyspnea or wheezing, Disp: 8.5 g, Rfl: 11     ALPRAZolam (XANAX XR) 1 MG 24 hr tablet, Take 1 mg by mouth daily, Disp: , Rfl:      aspirin (ASA) 81 MG tablet, Take 81 mg by mouth 2 times daily, Disp: , Rfl:      blood glucose monitoring (NO BRAND SPECIFIED) meter device kit, Use to test blood sugar 2 times daily or as directed. Include test strips (2 boxes) with 3 refills, Disp: 1 kit, Rfl: 0     celecoxib (CELEBREX) 200 MG capsule, TAKE 1 CAPSULE BY MOUTH TWICE DAILY AS NEEDED FOR MODERATE PAIN., Disp: 180 capsule, Rfl: 1     cetirizine (ZYRTEC) 10 MG tablet, Take 1 tablet (10 mg) by mouth At Bedtime, Disp: 30 tablet, Rfl: 11     ciprofloxacin-dexamethasone (CIPRODEX) 0.3-0.1 % otic suspension, Place 5 drops into the right ear twice a week, Disp: 1 Bottle, Rfl: 3     clotrimazole (LOTRIMIN) 1 % external solution, Apply 1 mL topically 2 times daily Correct use is 5 drops in right ear twice weekly, Disp: 10 mL, Rfl: 3     cyanocobalamin (VITAMIN B12) 1000 MCG/ML injection, Inject 1 mL (1,000 mcg) into the muscle every 30 days, Disp: 10 mL, Rfl: 0     DULoxetine (CYMBALTA) 60 MG capsule, Take 60 mg by mouth daily, Disp: , Rfl:      EPINEPHrine (EPIPEN/ADRENACLICK/OR ANY BX GENERIC EQUIV) 0.3 MG/0.3ML injection 2-pack, Inject 0.3 mLs (0.3 mg) into the muscle once as needed for anaphylaxis, Disp: 0.6 mL, Rfl: 3     Fexofenadine HCl (MUCINEX ALLERGY PO), , Disp: , Rfl:      fluticasone-salmeterol (ADVAIR DISKUS) 250-50 MCG/DOSE  inhaler, Inhale 1 puff into the lungs 2 times daily, Disp: 3 Inhaler, Rfl: 3     Ginger, Zingiber officinalis, (GINGER ROOT) 550 MG CAPS capsule, Take 550 mg by mouth Up to three times daily, Disp: , Rfl:      hydrOXYzine (ATARAX) 50 MG tablet, Take  mg by mouth as needed For anxiety and insomnia, Disp: , Rfl:      lamoTRIgine (LAMICTAL) 100 MG tablet, Take 200 mg by mouth daily, Disp: , Rfl:      levothyroxine (SYNTHROID/LEVOTHROID) 75 MCG tablet, TAKE 1 TABLET EVERY MORNING, Disp: 90 tablet, Rfl: 1     lidocaine (XYLOCAINE) 5 % external ointment, UAD, Disp: 30 g, Rfl:      loperamide (IMODIUM) 2 MG capsule, Take 2 mg by mouth 4 times daily as needed for diarrhea, Disp: , Rfl:      metFORMIN (GLUCOPHAGE-XR) 500 MG 24 hr tablet, Take 2 tablets (1,000 mg) by mouth daily (with dinner), Disp: 180 tablet, Rfl: 3     metoprolol succinate ER (TOPROL-XL) 200 MG 24 hr tablet, Take 2 tablets (400 mg) by mouth daily, Disp: 180 tablet, Rfl: 1     omega-3 acid ethyl esters (LOVAZA) 1 g capsule, TAKE 2 CAPSULES BY MOUTH TWICE DAILY., Disp: 360 capsule, Rfl: 1     omeprazole 20 MG tablet, Take 20 mg by mouth daily , Disp: , Rfl:      ondansetron (ZOFRAN) 8 MG tablet, TAKE 1 TABLET BY MOUTH EVERY 8 HOURS AS NEEDED FOR NAUSEA OR VOMITING, Disp: 20 tablet, Rfl: 4     order for DME, Respironics REMSTAR 60 Series Auto CPAP 9-13 cm H2O, Wisp nasal mask w/a large cushion and a chinstrap, Disp: , Rfl:      oxyCODONE (ROXICODONE) 5 MG tablet, Take 1-2 tablets (5-10 mg) by mouth every 6 hours as needed for pain (maximum 4 tablet(s) per day), Disp: 35 tablet, Rfl: 0     pregabalin (LYRICA) 150 MG capsule, Take 1 capsule (150 mg) by mouth 2 times daily, Disp: 60 capsule, Rfl: 11     pseudoePHEDrine (SUDAFED) 120 MG 12 hr tablet, Take 1 tablet (120 mg) by mouth every 12 hours, Disp: 28 tablet, Rfl: 0     ramipril (ALTACE) 10 MG capsule, Take 1 capsule (10 mg) by mouth daily, Disp: 90 capsule, Rfl: 1     rizatriptan (MAXALT-MLT) 5 MG ODT  tab, Take 1 tablet (5 mg) by mouth at onset of headache for migraine, Disp: 30 tablet, Rfl: 5     rosuvastatin (CRESTOR) 40 MG tablet, Take 1 tablet (40 mg) by mouth daily, Disp: 90 tablet, Rfl: 2     syringe, disposable, (BD TUBERCULIN SYRINGE) 1 ML MISC, Equipment being ordered: 1 ml tuberculin syringes to be used for Vitamin B12 injections., Disp: 12 each, Rfl: 1     tiZANidine (ZANAFLEX) 4 MG tablet, TAKE 1 TABLET BY MOUTH THREE TIMES DAILY AS NEEDED, Disp: 90 tablet, Rfl: 4     vitamin C (ASCORBIC ACID) 500 MG tablet, Take 500 mg by mouth daily, Disp: , Rfl:      vitamin D3 (CHOLECALCIFEROL) 45419 UNITS capsule, Take one capsule every two weeks., Disp: 24 capsule, Rfl: 1      Physical Exam:  /77 (BP Location: Left arm, Patient Position: Sitting, Cuff Size: Adult Large)   Pulse 88   Wt 148.1 kg (326 lb 8 oz)   SpO2 97%   BMI 38.72 kg/m    GENERAL: Well developed, well nourished, alert, and in no apparent distress.  HEENT: Normocephalic, atraumatic. PERRL, EOMI. Oral mucosa is moist. No perioral cyanosis.  NECK: supple, no masses, no thyromegaly.  RESP:  Normal respiratory effort.  CTAB.  No rales, wheezes, rhonchi.  No cyanosis or clubbing.  CV: Normal S1, S2, regular rhythm, normal rate. No murmur.  No LE edema.   ABDOMEN:  Soft, non-tender, non-distended.   SKIN: warm and dry. No rash.  NEURO: AAOx3.  Normal gait.  No focal neuro deficits.  PSYCH: mentation appears normal. and affect normal/bright    Results:  None.    Assessment and Plan:   Joel Pineda is a 43 year old male with a history of ankylosing spondylitis on Humira, acid reflux, allergies, and asthma who presents to pulmonary clinic today for further follow-up of asthma.  Since he was last seen in clinic in February he is overall doing well.  He denies any recent acute exacerbations of asthma or episodes of pneumonia.  As he has been doing so well, I agree that it would be reasonable to attempt de-escalation of his regimen.  His  preference would be to try to come off of the Singulair and to continue Advair at his current dose.  This is an appropriate plan.  He will continue on Advair 250/50 and albuterol as needed.  If in the future he develops worsening burden of asthma symptoms or recurrent exacerbations and will plan to add back Singulair.  He should continue daily Zyrtec for allergic rhinitis.  Questions and concerns were answered to the patient's satisfaction.  he was provided with my contact information should new questions or concerns arise in the interim.  he should return to clinic in 6 months.    Ericka Segovia MD  Pulmonary and Critical Care Medicine    The above note was dictated using voice recognition software and may include typographical errors. Please contact the author for any clarifications.

## 2019-06-05 ENCOUNTER — MYC MEDICAL ADVICE (OUTPATIENT)
Dept: PULMONOLOGY | Facility: CLINIC | Age: 44
End: 2019-06-05

## 2019-06-05 DIAGNOSIS — J45.30 MILD PERSISTENT ASTHMA: Primary | ICD-10-CM

## 2019-06-05 RX ORDER — MONTELUKAST SODIUM 10 MG/1
10 TABLET ORAL EVERY EVENING
Qty: 90 TABLET | Refills: 1 | Status: SHIPPED | OUTPATIENT
Start: 2019-06-05 | End: 2019-11-17

## 2019-06-06 ENCOUNTER — RADIOLOGY INJECTION OFFICE VISIT (OUTPATIENT)
Dept: PALLIATIVE MEDICINE | Facility: CLINIC | Age: 44
End: 2019-06-06
Payer: MEDICARE

## 2019-06-06 ENCOUNTER — MYC MEDICAL ADVICE (OUTPATIENT)
Dept: PULMONOLOGY | Facility: CLINIC | Age: 44
End: 2019-06-06

## 2019-06-06 ENCOUNTER — ANCILLARY PROCEDURE (OUTPATIENT)
Dept: RADIOLOGY | Facility: CLINIC | Age: 44
End: 2019-06-06
Attending: PSYCHIATRY & NEUROLOGY
Payer: MEDICARE

## 2019-06-06 VITALS — DIASTOLIC BLOOD PRESSURE: 100 MMHG | HEART RATE: 82 BPM | SYSTOLIC BLOOD PRESSURE: 144 MMHG | OXYGEN SATURATION: 98 %

## 2019-06-06 DIAGNOSIS — T78.40XA ALLERGIC REACTION, INITIAL ENCOUNTER: ICD-10-CM

## 2019-06-06 DIAGNOSIS — M54.16 LUMBAR RADICULOPATHY: Primary | ICD-10-CM

## 2019-06-06 DIAGNOSIS — J45.30 MILD PERSISTENT ASTHMA, UNSPECIFIED WHETHER COMPLICATED: ICD-10-CM

## 2019-06-06 DIAGNOSIS — R23.2 FLUSHING: ICD-10-CM

## 2019-06-06 DIAGNOSIS — M54.16 LUMBAR RADICULOPATHY: ICD-10-CM

## 2019-06-06 PROCEDURE — 64483 NJX AA&/STRD TFRM EPI L/S 1: CPT | Mod: 50 | Performed by: PSYCHIATRY & NEUROLOGY

## 2019-06-06 RX ORDER — OMEGA-3-ACID ETHYL ESTERS 1 G/1
CAPSULE, LIQUID FILLED ORAL
Qty: 360 CAPSULE | Refills: 0 | Status: SHIPPED | OUTPATIENT
Start: 2019-06-06 | End: 2019-09-23

## 2019-06-06 RX ORDER — DIPHENHYDRAMINE HCL 25 MG
50 CAPSULE ORAL ONCE
Status: COMPLETED | OUTPATIENT
Start: 2019-06-06 | End: 2019-06-06

## 2019-06-06 RX ADMIN — Medication 50 MG: at 10:22

## 2019-06-06 ASSESSMENT — PAIN SCALES - GENERAL: PAINLEVEL: MODERATE PAIN (4)

## 2019-06-06 NOTE — TELEPHONE ENCOUNTER
Prescription approved per [facility] Refill Protocol.    Dianne Henderson RN

## 2019-06-06 NOTE — TELEPHONE ENCOUNTER
I spoke with Tito. He would like a year supply of Advair sent to [facility] so he can get the generic Advair. He is not able to get this through [facility] where it was sent at his appointment on Tuesday. He would like to only get 1 month at a time. Medication sent to [facility].     Jones Alvarado RN   Pulmonary/CORE Care Coordinator  [facility]

## 2019-06-06 NOTE — PATIENT INSTRUCTIONS
You were given 50mg of oral benadryl.   This can make you drowsy.  If you experience any shortness of breath call 911.      [facility]   Procedure Discharge Instructions    Today you saw:     Dr. Elaine Diaz   You had an:  Epidural steroid injection   lumbar  Medications used:  Lidocaine   Bupivacaine   Dexamethasone   gadolinium            Be cautious when walking. Numbness and/or weakness in the lower extremities may occur for up to 6-8 hours after the procedure due to effect of the local anesthetic    Do not drive for 6 hours. The effect of the local anesthetic could slow your reflexes.     You may resume your regular activities after 24 hours    Avoid strenuous activity for the first 24 hours    You may shower, however avoid swimming, tub baths or hot tubs for 24 hours following your procedure    You may have a mild to moderate increase in pain for several days following the injection.    It may take up to 14 days for the steroid medication to start working although you may feel the effect as early as a few days after the procedure.       You may use ice packs for 10-15 minutes, 3 to 4 times a day at the injection site for comfort    Do not use heat to painful areas for 6 to 8 hours. This will give the local anesthetic time to wear off and prevent you from accidentally burning your skin.     Unless you have been directed to avoid the use of anti-inflammatory medications (NSAIDS), you may use medications such as ibuprofen, Aleve or Tylenol for pain control if needed.     If you were fasting, you may resume your normal diet and medications after the procedure    If you have diabetes, check your blood sugar more frequently than usual as your blood sugar may be higher than normal for 10-14 days following a steroid injection. Contact your doctor who manages your diabetes if your blood sugar is higher than usual    Possible side effects of steroids that you may experience include flushing,  elevated blood pressure, increased appetite, mild headaches and restlessness.  All of these symptoms will get better with time.    If you experience any of the following, call the Pain Clinic during work hours at [phone] or the Provider Line after hours at [phone]:  -Fever over 100 degree F  -Swelling, bleeding, redness, drainage, warmth at the injection site  -Progressive weakness or numbness in your legs or arms  -Loss of bowel or bladder function  -Unusual headache that is not relieved by Tylenol or other pain reliever  -Unusual new onset of pain that is not improving

## 2019-06-06 NOTE — PROGRESS NOTES
"Pre procedure Diagnosis: lumbar radiculopathy   Post procedure Diagnosis: Same  Procedure performed: bilateral L5 transforaminal epidural steroid injection   Anesthesia: none  Complications: flushing after procedure, resolved.  Operators: Ericka Diaz MD    Indications:   Joel Pineda is a 43 year old male was seen by me for interventional eval.  They have a history of low back and leg pain, going into the left > right.  His neuropathy pain is also feeling worse.  Exam shows wide gait, normal sensation in leg to LT and they have tried conservative treatment including medications, previous PT.    MRI of lumbar spine was completed on 12/7/16 showing:  \"Impression: No significant change since 4/14/2015. L5-S1 disc bulge  with superimposed left subarticular disc extrusion abuts and possibly  impinges the descending left S1 nerve roots.  Moderate to advanced  right and moderate left neural foraminal stenosis at L5-S1. No  significant central canal stenosis. \"    Options/alternatives, benefits and risks were discussed with the patient including bleeding, infection, tissue trauma, numbness, weakness, paralysis, spinal cord injury, radiation exposure, headache and reaction to medications. Questions were answered to his satisfaction and he agrees to proceed. Voluntary informed consent was obtained and signed.     Vitals were reviewed: Yes  Allergies were reviewed:  Yes - we had long conversation about his allergies.  He has had problems with both injectates in the past, states that the iodine contrasts cause significant problems and he has had problems even with premedication.  He has had problems with gadolinium at a high dose, but has had it several times with us in this clinic with no issue.  Medications were reviewed:  Yes   Pre-procedure pain score: 4/10    Procedure:  After getting informed consent, patient was brought into the procedure suite and was placed in a prone position on the procedure table.   A Pause for " the Cause was performed.  Patient was prepped with chloroprep and draped in sterile fashion.     After identifying the left, then right  L5 neuroforamen, the C-arm was rotated to a left, then later right  lateral oblique angle.  A total of 7ml of Lidocaine 1% was used to anesthetize the skin and the needle track at a skin entry site coaxial with the fluoroscopy beam, and overriding the superior aspect of the neuroforamen.  A 22 gauge 5 and 7 inch spinal needle were advanced under intermittent fluoroscopy until it entered the foramen superiorly.    The position was then inspected from anteroposterior and lateral views, and the needle adjusted appropriately.  A total of 3ml of gadolinium was injected, confirming appropriate position, with spread into the nerve root sheath and the epidural space, with no intravascular uptake. 3ml was wasted    1.5ml of 0.5% bupivacaine with 10mg of dexamethasone was injected, divided equally between the two sides.  The needle was flushed with lidocaine and removed.    During the procedure, there was a paresthesia from pressure  Hemostasis was achieved, the area was cleaned, and bandaids were placed when appropriate.  The patient tolerated the procedure well, and was taken to the recovery room.  He had flushing of face after procedure, with no other symptoms.  Given history of problems with gadolinium (even though he has had here more than once with no issues), benadryl 50mg was given.  Patient was watched for ~30 min, and flushing quickly resolved and no other issues.  Patient was given symptoms to watch for, and was discharged with no issues.    Images were saved to PACS.    Post-procedure pain score: 0/10  Follow-up includes:   -f/u phone call in one week  -f/u with referring provider    Ericka Diaz MD  [facility]      Addendum: I called and spoke with Tito today, and he is doing well.  There were no other symptoms.  He has had gadolinium on several occasions with  us (2/12/18,  7/13/15, 3/18/15, 12/13/14) at about the same amount (~3ml).  This is the first in the epidural space, which potentially could have different absorption.  Given we cannot use iodine, I talked to him about premedications.  We talked about option of referral to allergy specialist before.  I am wanting to know if full premedication is needed, as that typically involves steroid and he is also getting steroid during these injections- which seems like a lot of steroid.

## 2019-06-06 NOTE — NURSING NOTE
Discharge Information    IV Discontiued Time:  NA    Amount of Fluid Infused:  NA    Discharge Criteria = When patient returns to baseline or as per MD order    Consciousness:  Pt is fully awake    Circulation:  BP +/- 20% of pre-procedure level    Respiration:  Patient is able to breathe deeply    O2 Sat:  Patient is able to maintain O2 Sat >92% on room air    Activity:  Moves 4 extremities on command    Ambulation:  Patient is able to stand and walk or stand and pivot into wheelchair    Dressing:  Clean/dry or No Dressing    Notes:   Discharge instructions and AVS given to patient    Patient meets criteria for discharge?  YES    Admitted to PCU?  No    Responsible adult present to accompany patient home?  Yes    Signature/Title:    mitch cummings RN Care Coordinator  [facility]

## 2019-06-06 NOTE — NURSING NOTE
Pre-procedure Intake    Have you been fasting? NA    If yes, for how long? NA    Are you taking a prescribed blood thinner such as coumadin, Plavix, Xarelto?    No    If yes, when did you take your last dose? NA    Do you take aspirin?  Yes -   ASA    If cervical procedure, have you held aspirin for 6 days?   NA    Do you have any allergies to contrast dye, iodine, steroid and/or numbing medications?  YES: Contrast Dye    Are you currently taking antibiotics or have an active infection?  NO    Have you had a fever/elevated temperature within the past week? NO    Are you currently taking oral steroids? NO    Do you have a ? Yes       Are you pregnant or breastfeeding?  Not Applicable    Are the vital signs normal?  Yes      Char Shi CMA ([facility])

## 2019-06-09 ENCOUNTER — MYC REFILL (OUTPATIENT)
Dept: FAMILY MEDICINE | Facility: CLINIC | Age: 44
End: 2019-06-09

## 2019-06-09 DIAGNOSIS — E55.9 VITAMIN D DEFICIENCY: ICD-10-CM

## 2019-06-09 DIAGNOSIS — E53.8 B12 DEFICIENCY: ICD-10-CM

## 2019-06-10 NOTE — TELEPHONE ENCOUNTER
Requested Prescriptions   Pending Prescriptions Disp Refills     syringe, disposable, (BD TUBERCULIN SYRINGE) 1 ML MISC        Last Written Prescription Date:  09/24/18  Last Fill Quantity: 12,   # refills: 1  Last Office Visit: 05/28/19-[place]  Future Office visit:    Next 5 appointments (look out 90 days)    Jun 12, 2019 10:00 AM CDT  Kal Bundy with Ksenia Lyles MD  [facility] ([facility]) [address] [place] [id number] [phone]           Routing refill request to provider for review/approval because:  Drug not on the [facility], [facility] or [facility] refill protocol or controlled substance 12 each 1     Sig: Equipment being ordered: 1 ml tuberculin syringes to be used for Vitamin B12 injections.       There is no refill protocol information for this order

## 2019-06-10 NOTE — TELEPHONE ENCOUNTER
Forms mailed to   [employer]  [facility] [address] [place] [address], [id number] [place] [phone]    Cheryl AGUILAR, Patient Care

## 2019-06-10 NOTE — TELEPHONE ENCOUNTER
"Requested Prescriptions   Pending Prescriptions Disp Refills     cholecalciferol (VITAMIN D3) 12097 units (1250 mcg) capsule 24 capsule 1     Sig: Take one capsule every two weeks.       Vitamin Supplements (Adult) Protocol Failed - 6/10/2019  6:36 AM        Failed - High dose Vitamin D not ordered        Passed - Recent (12 mo) or future (30 days) visit within the authorizing provider's specialty     Patient had office visit in the last 12 months or has a visit in the next 30 days with authorizing provider or within the authorizing provider's specialty.  See \"Patient Info\" tab in inbasket, or \"Choose Columns\" in Meds & Orders section of the refill encounter.              Passed - Medication is active on med list        cyanocobalamin (CYANOCOBALAMIN) 1000 MCG/ML injection 10 mL 0     Sig: Inject 1 mL (1,000 mcg) into the muscle every 30 days       Vitamin Supplements (Adult) Protocol Failed - 6/10/2019  6:36 AM        Failed - High dose Vitamin D not ordered        Passed - Recent (12 mo) or future (30 days) visit within the authorizing provider's specialty     Patient had office visit in the last 12 months or has a visit in the next 30 days with authorizing provider or within the authorizing provider's specialty.  See \"Patient Info\" tab in inbasket, or \"Choose Columns\" in Meds & Orders section of the refill encounter.              Passed - Medication is active on med list          cholecalciferol (VITAMIN D3) 21906 units (1250 mcg) capsule      Last Written Prescription Date:  na  Last Fill Quantity: 24,   # refills: 1  Last Office Visit: 5/28/19  Future Office visit:    Next 5 appointments (look out 90 days)    Jun 12, 2019 10:00 AM CDT  Kal Bundy with Ksenia Lyles MD  [facility] ([facility]) [address] [place] [id number] [phone]           Routing refill request to provider for review/approval because:  Drug not active on patient's " medication list    cyanocobalamin (CYANOCOBALAMIN) 1000 MCG/ML injection  Last Written Prescription Date:  9/25/18  Last Fill Quantity: 10ml,  # refills: 0   Last office visit: 5/28/2019 with prescribing provider:  Dr. Lyles   Future Office Visit:   Next 5 appointments (look out 90 days)    Jun 12, 2019 10:00 AM CDT  Kal Bundy with Ksenia Lyles MD  [facility] ([facility]) [address] [place] [id number] [phone]

## 2019-06-11 ENCOUNTER — ALLIED HEALTH/NURSE VISIT (OUTPATIENT)
Dept: PHARMACY | Facility: CLINIC | Age: 44
End: 2019-06-11
Payer: COMMERCIAL

## 2019-06-11 DIAGNOSIS — J45.31 MILD PERSISTENT ASTHMA WITH ACUTE EXACERBATION: ICD-10-CM

## 2019-06-11 DIAGNOSIS — M45.8 ANKYLOSING SPONDYLITIS OF SACRAL REGION (H): ICD-10-CM

## 2019-06-11 DIAGNOSIS — R53.83 OTHER FATIGUE: ICD-10-CM

## 2019-06-11 DIAGNOSIS — H66.90 EAR INFECTION: ICD-10-CM

## 2019-06-11 DIAGNOSIS — J30.1 ALLERGIC RHINITIS DUE TO POLLEN, UNSPECIFIED SEASONALITY: ICD-10-CM

## 2019-06-11 DIAGNOSIS — F41.8 DEPRESSION WITH ANXIETY: Primary | ICD-10-CM

## 2019-06-11 DIAGNOSIS — E78.5 HYPERLIPIDEMIA LDL GOAL <100: ICD-10-CM

## 2019-06-11 PROCEDURE — 99606 MTMS BY PHARM EST 15 MIN: CPT | Performed by: PHARMACIST

## 2019-06-11 PROCEDURE — 99607 MTMS BY PHARM ADDL 15 MIN: CPT | Performed by: PHARMACIST

## 2019-06-11 RX ORDER — FAMOTIDINE 10 MG
20 TABLET ORAL
COMMUNITY
End: 2020-01-28

## 2019-06-11 NOTE — PROGRESS NOTES
SUBJECTIVE/OBJECTIVE:                           Joel Pineda is a 42 year old male seen in the infusion center for a follow visit for Medication Therapy Management.  He was referred to me from Ksenia Lyles.     Chief Complaint: Follow up from 4/16/19.    Allergies/ADRs: Reviewed in Epic  Tobacco: No tobacco use  Alcohol: none  Caffeine: not assessed today  Activity: Not assessed today  PMH: Reviewed in Epic    Asthma: Current asthma medications: ICS/LABA- Advair 1 puff(s) twice daily, albuterol prn, singulair. Patient did try to discontinue singulair but had an increase in asthma symptoms so restarted.  Pt reports the following symptoms: none.  AAP on file: YES  PIF was completed today: No  ACT Total Scores 8/28/2018 1/3/2019 5/28/2019   ACT TOTAL SCORE - - -   ASTHMA ER VISITS - - -   ASTHMA HOSPITALIZATIONS - - -   ACT TOTAL SCORE (Goal Greater than or Equal to 20) 16 22 22   In the past 12 months, how many times did you visit the emergency room for your asthma without being admitted to the hospital? 0 1 1   In the past 12 months, how many times were you hospitalized overnight because of your asthma? 0 0 0     Allergies: patient is holding cetrizine and hydroxyzine for the next week because he is seeing an allergist next week for potential allergy testing.    Fatigue: patient continues to experience fatigue. He is napping quite frequently. He does not feel this is due to Lyrica. He is still wondering if this is the Humira. Patient does not feel this is related to his mood. Saw therapist today and encouraged him to get out an enjoy the day.     Ankylosing Spondylitis/Pain: Current medications include Humira 40mg every 2 weeks.  Patient felt Humira was starting to work before he delay in treatment because of an infection.    Ear Infection: Current therapy includes ciprodex 5 drops both ears twice a week and clotrimazole 5 drops both ears twice a week for prevention.     Mood/Anxiety: current therapy  includes duloxetine 60mg daily, lamictal 200mg daily, Xanax XR 1mg every day. Patient sees his therapist every week. Patient is seeing a new psychiatrist, Dr. Alegria with [facility] in [place] and has been following with him once a month.  Patient goes to DBT group weekly on Tuesdays. Patient feels group has been helpful.      Hyperlipidemia: Current therapy includes rosuvastatin 40mg once daily, Lovaza 2gm in once daily. Pt reports no significant myalgias or other side effects. Last lipid panel was March and trigs were 583.     ASSESSMENT:                             Current medications were reviewed today.     Medication Adherence: no issues identified    Fatigue: Needs improvement.  Lapse in therapy for Ankylosing spondylitis could potentially be contributing. Patient to continue to monitor and continue working with psychiatry and his therapist. Patient will also be holding hydroxyzine over the next week and may see some improvement in fatigue with this change.    Ankylosing Spondylitis: Needs improvement. Patient has had interruptions in therapy due to recurrent infections. Too early to assess efficacy.     Mood/Anxiety: Stable.  Patient in close follow-up with psychiatry.    Hyperlipidemia: Stable. Could consider updated lipid lab since trigs are >500.    PLAN:                            Could consider updated lipid panel. Patient to discuss with PCP.    I spent 30 minutes with this patient today. All changes were made via collaborative practice agreement with Ksenia Lyles. A copy of the visit note was provided to the patient's primary care provider.    Will follow up in 6 weeks.    The patient was sent via [employer] a summary of these recommendations as an after visit summary.     Radha Mcdonald, Pharm.D, BCACP  Medication Therapy Management Pharmacist

## 2019-06-11 NOTE — PATIENT INSTRUCTIONS
Recommendations from today's MTM visit:                                                        Could consider updated lipid panel.     Next MTM visit: 6 weeks    To schedule another MTM appointment, please call the clinic directly or you may call the MTM scheduling line at [phone] or toll-free at [phone].     My Clinical Pharmacist's contact information:                                                      It was a pleasure talking with you today!  Please feel free to contact me with any questions or concerns you have.      Radha Mcdonald, Pharm.D, [facility]  Medication Therapy Management Pharmacist      You may receive a survey about the MTM services you received by email and/or US Mail.  I would appreciate your feedback to help me serve you better in the future. Your comments will be anonymous.

## 2019-06-12 ENCOUNTER — MYC MEDICAL ADVICE (OUTPATIENT)
Dept: PALLIATIVE MEDICINE | Facility: CLINIC | Age: 44
End: 2019-06-12

## 2019-06-12 ENCOUNTER — OFFICE VISIT (OUTPATIENT)
Dept: FAMILY MEDICINE | Facility: CLINIC | Age: 44
End: 2019-06-12
Payer: MEDICARE

## 2019-06-12 VITALS
OXYGEN SATURATION: 96 % | BODY MASS INDEX: 37.19 KG/M2 | SYSTOLIC BLOOD PRESSURE: 133 MMHG | TEMPERATURE: 98.3 F | DIASTOLIC BLOOD PRESSURE: 89 MMHG | HEART RATE: 82 BPM | WEIGHT: 315 LBS | HEIGHT: 77 IN

## 2019-06-12 DIAGNOSIS — G89.4 CHRONIC PAIN SYNDROME: ICD-10-CM

## 2019-06-12 DIAGNOSIS — M45.8 ANKYLOSING SPONDYLITIS OF SACRAL REGION (H): Primary | ICD-10-CM

## 2019-06-12 DIAGNOSIS — E11.8 TYPE 2 DIABETES MELLITUS WITH COMPLICATION, WITHOUT LONG-TERM CURRENT USE OF INSULIN (H): ICD-10-CM

## 2019-06-12 DIAGNOSIS — M51.369 DDD (DEGENERATIVE DISC DISEASE), LUMBAR: ICD-10-CM

## 2019-06-12 LAB
AMPHETAMINES UR QL: NOT DETECTED NG/ML
BARBITURATES UR QL SCN: NOT DETECTED NG/ML
BENZODIAZ UR QL SCN: ABNORMAL NG/ML
BUPRENORPHINE UR QL: NOT DETECTED NG/ML
CANNABINOIDS UR QL: NOT DETECTED NG/ML
COCAINE UR QL SCN: NOT DETECTED NG/ML
D-METHAMPHET UR QL: NOT DETECTED NG/ML
METHADONE UR QL SCN: NOT DETECTED NG/ML
OPIATES UR QL SCN: NOT DETECTED NG/ML
OXYCODONE UR QL SCN: NOT DETECTED NG/ML
PCP UR QL SCN: NOT DETECTED NG/ML
PROPOXYPH UR QL: NOT DETECTED NG/ML
TRICYCLICS UR QL SCN: NOT DETECTED NG/ML

## 2019-06-12 PROCEDURE — 99214 OFFICE O/P EST MOD 30 MIN: CPT | Performed by: FAMILY MEDICINE

## 2019-06-12 PROCEDURE — 80306 DRUG TEST PRSMV INSTRMNT: CPT | Performed by: FAMILY MEDICINE

## 2019-06-12 RX ORDER — CHOLECALCIFEROL (VITAMIN D3) 1250 MCG
CAPSULE ORAL
Qty: 24 CAPSULE | Refills: 1 | Status: SHIPPED | OUTPATIENT
Start: 2019-06-12 | End: 2021-05-07

## 2019-06-12 RX ORDER — CYANOCOBALAMIN 1000 UG/ML
1 INJECTION, SOLUTION INTRAMUSCULAR; SUBCUTANEOUS
Qty: 10 ML | Refills: 0 | Status: SHIPPED | OUTPATIENT
Start: 2019-06-12 | End: 2020-03-03

## 2019-06-12 RX ORDER — OXYCODONE HYDROCHLORIDE 5 MG/1
5-10 TABLET ORAL EVERY 6 HOURS PRN
Qty: 35 TABLET | Refills: 0 | Status: SHIPPED | OUTPATIENT
Start: 2019-06-12 | End: 2019-07-01

## 2019-06-12 ASSESSMENT — MIFFLIN-ST. JEOR: SCORE: 2462.96

## 2019-06-12 NOTE — LETTER
[facility]  06/12/19    Patient: Joel Pineda  YOB: 1975  Medical Record Number: [id number]                                                                  Opioid / Opioid Plus Controlled Substance Agreement    I understand that my care provider has prescribed an opioid (narcotic) controlled substance to help manage my condition(s). I am taking this medicine to help me function or work. I know this is strong medicine, and that it can cause serious side effects. Opioid medicine can be sedating, addicting and may cause a dependency on the drug. They can affect my ability to drive or think, and cause depression. They need to be taken exactly as prescribed. Combining opioids with certain medicines or chemicals (such as cocaine, sedatives and tranquilizers, sleeping pills, meth) can be dangerous or even fatal. Also, if I stop opioids suddenly, I may have severe withdrawal symptoms. Last, I understand that opioids do not work for all types of pain nor for all patients. If not helpful, I may be asked to stop them.        The risks, benefits, and side effects of these medicine(s) were explained to me. I agree that:    1. I will take part in other treatments as advised by my care team. This may be psychiatry or counseling, physical therapy, behavioral therapy, group treatment or a referral to a pain clinic. I will reduce or stop my medicine when my care team tells me to do so.  2. I will take my medicines as prescribed. I will not change the dose or schedule unless my care team tells me to. There will be no refills if I  run out early.   I may be contactedwithout warning and asked to complete a urine drug test or pill count at any time.   3. I will keep all my appointments, and understand this is part of the monitoring of opioids. My care team may require an office visit for EVERY opioid/controlled substance refill. If I miss appointments or don t follow instructions, my care team may stop my  medicine.  4. I will not ask other providers to prescribe controlled substances, and I will not accept controlled substances from other people. If I need another prescribed controlled substance for a new reason, I will tell my care team within 1 business day.  5. I will use one pharmacy to fill all of my controlled substance prescriptions, and it is up to me to make sure that I do not run out of my medicines on weekends or holidays. If my care team is willing to refill my opioid prescription without a visit, I must request refills only during office hours, refills may take up to 3 days to process, and it may take up to 5 to 7 days for my medicine to be mailed and ready at my pharmacy. Prescriptions will not be mailed anywhere except my pharmacy.        [id number]  Rev 12/18         Registration to scan to EHR                             Page 1 of 2               Controlled Substance Agreement Opioid        [facility]  06/12/19  Patient: Joel Pineda  YOB: 1975  Medical Record Number: [id number]                                                                  6. I am responsible for my prescriptions. If the medicine/prescription is lost or stolen, it will not be replaced. I also agree not to share controlled substance medicines with anyone.  7. I agree to not use ANY illegal or recreational drugs. This includes marijuana, cocaine, bath salts or other drugs. I agree not to use alcohol unless my care team says I may.          I agree to give urine samples whenever asked. If I don t give a urine sample, the care team may stop my medicine.    8. If I enroll in the [place] Medical Marijuana program, I will tell my care team. I will also sign an agreement to share my medical records with my care team.   9. I will bring in my list of medicines (or my medicine bottles) each time I come to the clinic.   10. I will tell my care team right away if I become pregnant or have a new medical problem  treated outside of my regular clinic.  11. I understand that this medicine can affect my thinking and judgment. It may be unsafe for me to drive, use machinery and do dangerous tasks. I will not do any of these things until I know how the medicine affects me. If my dose changes, I will wait to see how it affects me. I will contact my care team if I have concerns about medicine side effects.    I understand that if I do not follow any of the conditions above, my prescriptions or treatment may be stopped.      I agree that my provider, clinic care team, and pharmacy may work with any city, state or federal law enforcement agency that investigates the misuse, sale, or other diversion of my controlled medicine. I will allow my provider to discuss my care with or share a copy of this agreement with any other treating provider, pharmacy or emergency room where I receive care. I agree to give up (waive) any right of privacy or confidentiality with respect to these consents.     I have read this agreement and have asked questions about anything I did not understand.      ________________________________________________________________________  Patient signature - Date/Time -  Joel Pineda                                      ________________________________________________________________________  Witness signature                                                            ________________________________________________________________________  Provider signature - Ksenia Lyles MD      [id number]  Rev 12/18         Registration to scan to EHR                         Page 2 of 2                   Controlled Substance Agreement Opioid           Page 1 of 2  Opioid Pain Medicines (also known as Narcotics)  What You Need to Know    What are opioids?   Opioids are pain medicines that must be prescribed by a doctor.  They are also known as narcotics.    Examples are:     morphine (MS Contin, Lindy)    oxycodone  (Oxycontin)    oxycodone and acetaminophen (Percocet)    hydrocodone and acetaminophen (Vicodin, Norco)     fentanyl patch (Duragesic)     hydromorphone (Dilaudid)     methadone     What do opioids do well?   Opioids are best for short-term pain after a surgery or injury. They also work well for cancer pain. Unlike other pain medicines, they do not cause liver or kidney failure or ulcers. They may help some people with long-lasting (chronic) pain.     What do opioids NOT do well?   Opioids never get rid of pain entirely, and they do not work well for most patients with chronic pain. Opioids do not reduce swelling, one of the causes of pain. They also don t work well for nerve pain.                           For informational purposes only.  Not to replace the advice of your care provider.  Copyright [facility]. All right reserved. [employer] 288600-Oqc 02/18.      Page 2 of 2    Risks and side effects   Talk to your doctor before you start or decide to keep taking one of these medicines. Side effects include:    Lowering your breathing rate enough to cause death    Overdose, including death, especially if taking higher than prescribed doses    Long-term opioid use    Worse depression symptoms; less pleasure in things you usually enjoy    Feeling tired or sluggish    Slower thoughts or cloudy thinking    Being more sensitive to pain over time; pain is harder to control    Trouble sleeping or restless sleep    Changes in hormone levels (for example, less testosterone)    Changes in sex drive or ability to have sex    Constipation    Unsafe driving    Itching and sweating    Feeling dizzy    Nausea, vomiting and dry mouth    What else should I know about opioids?  When someone takes opioids for too long or too often, they become dependent. This means that if you stop or reduce the medicine too quickly, you will have withdrawal symptoms.    Dependence is not the same as addiction. Addiction is when  people keep using a substance that harms their body, their mind or their relations with others. If you have a history of drug or alcohol abuse, taking opioids can cause a relapse.    Over time, opioids don t work as well. Most people will need higher and higher doses. The higher the dose, the more serious the side effects. We don t know the long-term effects of opioids.      Prescribed opioids aren't the best way to manage chronic pain    Other ways to manage pain include:      Ibuprofen or acetaminophen.  You should always try this first.      Treat health problems that may be causing pain.      acupuncture or massage, deep breathing, meditation, visual imagery, aromatherapy.      Use heat or ice at the pain site      Physical therapy and exercise      Stop smoking      See a counselor or therapist                                                  People who have used opioids for a long time may have a lower quality of life, worse depression, higher levels of pain and more visits to doctors.    Never share your opioids with others. Be sure to store opioids in a secure place, locked if possible.Young children can easily swallow them and overdose.     You can overdose on opioids.  Signs of overdose include decrease or loss of consciousness, slowed breathing, trouble waking and blue lips.  If someone is worried about overdose, they should call 911.    If you are at risk for overdose, you may get naloxone (Narcan, a medicine that reverses the effects of opioids.  If you overdose, a friend or family member can give you Narcan while waiting for the ambulance.  They need to know the signs of overdose and how to give Narcan.    While you're taking opioids:    Don't use alcohol or street drugs. Taking them together can cause death.    Don't take any of these medicines unless your doctor says its okay.  Taking these with opioids can cause death.    Benzodiazepines (such as lorazepam         or diazepam)    Muscle relaxers  (such as cyclobenzaprine)    sleeping pills    other opioids    Safe disposal of opioids  Find your area drug take-back program, your pharmacy mail-back program, buy a special disposal bag (such as Deterra) from your pharmacy or flush them down the toilet.  Use the guidelines at:  www.fda.gov/drugs/resourcesforyou

## 2019-06-12 NOTE — PROGRESS NOTES
Subjective     Joel Pineda is a 43 year old male who presents to clinic today for the following health issues:    HPI   Chronic/Recurring Back Pain Follow Up      Where is your back pain located? (Select all that apply) low back bilateral    How would you describe your back pain?  sharp and shooting    Where does your back pain spread? the right and left buttock    Since your last clinic visit for back pain, how has your pain changed? gradually worsening    Does your back pain interfere with your job? Not applicable    Since your last visit, have you tried any new treatment? Yes -  steroid injection      Amount of exercise or physical activity: 2-3 days/week for an average of 15-30 minutes    Problems taking medications regularly: Yes,  side effects    Medication side effects: Fatigue and nausea     Diet: regular (no restrictions)    SUBJECTIVE:  Here today in follow-up of chronic back issues.  Well-known to me with long-standing history of degenerative lumbar disease and now under treatment for ankylosing spondylitis.  Rare intermittent use of pain medication, but it is enough that it falls under her chronic pain program we are due to update his controlled substance agreement.  Discussed with the patient today and he is completely on board with parameters and willing to leave a urine drug screen.  No concerns regarding diversion or misuse or drugs of abuse.  Has been seeing intensive therapy in the outside recently diagnosed with borderline personality disorder.  And is reading on it it does seem to fit and we discussed the relationship of this and physical trauma such as he has had.  Due to recheck on some of his diabetes lab work.  Nonfasting today    Review of systems otherwise negative.  Past medical, family, and social history reviewed and updated in chart.    OBJECTIVE:  /89 (BP Location: Right arm, Patient Position: Chair, Cuff Size: Adult Large)   Pulse 82   Temp 98.3  F (36.8  C) (Oral)   Ht  "1.956 m (6' 5\")   Wt 146.1 kg (322 lb)   SpO2 96%   BMI 38.18 kg/m    Alert, pleasant, upbeat, and in no apparent discomfort.  S1 and S2 normal, no murmurs, clicks, gallops or rubs. Regular rate and rhythm. Chest is clear; no wheezes or rales. No edema or JVD.  Past labs reviewed with the patient.     ASSESSMENT / PLAN:  (M45.8) Ankylosing spondylitis of sacral region (H)  (primary encounter diagnosis)  Comment: Stable and refilled monitored.  Plan: oxyCODONE (ROXICODONE) 5 MG tablet            (M51.36) DDD (degenerative disc disease), lumbar  Comment:   Plan: oxyCODONE (ROXICODONE) 5 MG tablet            (G89.4) Chronic pain syndrome  Comment: Controlled substance agreement updated today  Plan: Drug Abuse Screen Panel 13, Urine (Pain Care         Package)            (E11.8) Type 2 diabetes mellitus with complication, without long-term current use of insulin (H)  Comment: Future lab work  Plan: **A1C FUTURE anytime, Lipid panel reflex to         direct LDL Fasting            Follow up 3 months  JA Lyles MD    (Chart documentation completed in part with Dragon voice-recognition software.  Even though reviewed some grammatical, spelling, and word errors may remain.)       "

## 2019-06-12 NOTE — TELEPHONE ENCOUNTER
For vitamin D 50,000 units:  Routing refill request to provider for review/approval because:  Drug not on the G refill protocol     For vitamin B-12 injection:  Routing refill request to provider for review/approval because:  Protocol failed due to concurrent use of high dose vitamin D      Martell Chaves RN, BSN, PHN

## 2019-06-12 NOTE — TELEPHONE ENCOUNTER
Prescription approved per [facility].      Martell Chaves RN, BSN, PHN

## 2019-06-13 NOTE — TELEPHONE ENCOUNTER
From: Tito Pineda      Created: 6/12/2019 6:21 PM        Dr. Diaz,    Good evening. During the past two office visits, you shared that within the most recent ([age]-year old) MRI there are one (or more?) nerves near L5-S1 that do not have the usual fatty covering. Were you referring to absence or inflammation of myelin sheath?    When you have a moment, please clarify if this appears likely to have been caused by physical compression (DDD?).    Are plaques or lesions visible on the MRI?    Finally, would you recommend that I follow-up with a neurologist that specializes in MS?    Sincerely,  Tito  [phone]

## 2019-06-13 NOTE — TELEPHONE ENCOUNTER
Tito,  The discussion about Myelin and multiple sclerosis is NOT the same as the conversation we had about your back.  Multiple Sclerosis is in the central nervous system (brain and spinal cord), not the peripheral nervous system (nerves)  When we discussed your back, we are looking at the area AROUND the nerve, not the myelin covering the nerve..  In your back, you have narrowing in the space AROUND the nerve.    We cannot see myelin on a single nerve on an MRI.  We can see changes like multiple sclerosis were many (thousands, millions??) of nerves are effected.  Therefore we only look in the brain and spine, and not once the nerves have exited the spinal cord.    In the lumbar spine, the spinal cord ends near the top of the image (T12-L1 in most people), so a lumbar spine MRI is not really where you would look for demyelination.  The report says visuallized cord is normal (this would have been brought up right away if there was concern).    So I personally have not had reason to recommend that you see a neurologist about multiple sclerosis.      Ericka Diaz MD  [facility]

## 2019-06-14 ENCOUNTER — NURSE TRIAGE (OUTPATIENT)
Dept: NURSING | Facility: CLINIC | Age: 44
End: 2019-06-14

## 2019-06-14 ENCOUNTER — TELEPHONE (OUTPATIENT)
Dept: PALLIATIVE MEDICINE | Facility: CLINIC | Age: 44
End: 2019-06-14

## 2019-06-14 NOTE — TELEPHONE ENCOUNTER
Received page from answering service from patient with complaints of foot drop.    Spoke with patient who tells me that he was walking for long periods and noticed that he developed foot drop on the right.  He had bilateral TFESI L5-S1 on 6/6/19 by Dr Diaz without complications and denied weakness the day of the procedure.      He states that he took Tylenol, Oxycodone, and iced his back while resting and had return of normal strength and range of motion of the foot.  He was asked to walk on his toes and heels and he stated that he was able to do this without problems.    I instructed him that he likely had nerve irritation from his prolonged activity from irritation of the nerve and very unlikely related to his procedures and that since his strength has fully returned that it is not an urgent or emergent situation but that he should monitor for return of symptoms and inform Dr Diaz or his primary care provider as he may need an updated lumbar MRI and possible surgical referral.  He was instructed to continue his medications as prescribed and other conservative measures.    He verbalized understanding and was satisfied with the encounter.    Oliver Norman MD  [facility]

## 2019-06-14 NOTE — TELEPHONE ENCOUNTER
"Joel had an episode of right foot drop today. It lasted briefly and he has been able to ambulate since.    He had a bilateral L5-S1 LESI last Thursday, 6/6 with Dr. Diaz.    Today he has been experiencing low back pain and pain in his left calf.     He reports achy pain in right calf that he describes, \"like lactic acid buildup after exercising\". Possibly like a muscle spasm.    Notified that on-call provider would be paged. Call back if no provider contact within 20-30 minutes.    5:54 pm - spoke to page  Farida. On-call provider, Dr. Fabricio Norman to be paged directly to [phone]    Dorita Schmitt RN  [facility]          Reason for Disposition    Weakness of a leg or foot (e.g., unable to bear weight, dragging foot)    Additional Information    Negative: Passed out (i.e., lost consciousness, collapsed and was not responding)    Negative: Shock suspected (e.g., cold/pale/clammy skin, too weak to stand, low BP, rapid pulse)    Negative: Sounds like a life-threatening emergency to the triager    Negative: [1] SEVERE back pain (e.g., excruciating) AND [2] sudden onset AND [3] age > 60    Negative: [1] Unable to urinate (or only a few drops) > 4 hours AND     [2] bladder feels very full (e.g., palpable bladder or strong urge to urinate)    Negative: [1] Urinary or bowel incontinence (i.e., loss of bladder or bowel control) AND [2] new onset    Negative: Numbness in groin or rectal area (i.e., loss of sensation)    Negative: [1] SEVERE abdominal pain AND [2] present > 1 hour    Negative: [1] Abdominal pain AND [2] age > 60    Protocols used: BACK PAIN-A-AH      "

## 2019-06-19 ENCOUNTER — OFFICE VISIT (OUTPATIENT)
Dept: ALLERGY | Facility: CLINIC | Age: 44
End: 2019-06-19
Payer: MEDICARE

## 2019-06-19 ENCOUNTER — MYC MEDICAL ADVICE (OUTPATIENT)
Dept: PALLIATIVE MEDICINE | Facility: CLINIC | Age: 44
End: 2019-06-19

## 2019-06-19 VITALS
SYSTOLIC BLOOD PRESSURE: 132 MMHG | WEIGHT: 315 LBS | BODY MASS INDEX: 38.63 KG/M2 | HEART RATE: 77 BPM | OXYGEN SATURATION: 98 % | DIASTOLIC BLOOD PRESSURE: 91 MMHG

## 2019-06-19 DIAGNOSIS — M54.16 LUMBAR RADICULOPATHY: Primary | ICD-10-CM

## 2019-06-19 DIAGNOSIS — T50.8X5A ADVERSE EFFECT OF CONTRAST MEDIA, INITIAL ENCOUNTER: Primary | ICD-10-CM

## 2019-06-19 PROCEDURE — 99204 OFFICE O/P NEW MOD 45 MIN: CPT | Performed by: ALLERGY & IMMUNOLOGY

## 2019-06-19 NOTE — TELEPHONE ENCOUNTER
"Zacht message from patient on 6/19 at 1229:    Dr. Diaz,     Good afternoon.     I saw Dr. Mauricio this morning, and she provided premedication instructions for future imaging studies.     Following up on a conversation we had at the time of the epidural injection two weeks ago, I wanted to provide you with a symptom update and see if you want to proceed with a lumbar MRI.     I spoke with Dr. Judie Norman last week about a \"foot drop\" occurence with my right foot (after pushing a shopping cart for around 45 minutes).  Today when leaving the [place] clinic, I had a sharp pain in my low back at the same time as my left knee buckled.     Thanks for your time and help.     Sincerely,   Tito   [phone]   ------  Will route to Ericka Diaz MD for review.     JAVON CalabreseN, RN-BC  Patient Care Supervisor/Care Coordinator  [facility]    "

## 2019-06-19 NOTE — LETTER
6/19/2019         RE: Joel Pineda  [address] [place] [address]        Dear Colleague,    Thank you for referring your patient, Joel Pineda, to the [facility]. Please see a copy of my visit note below.    Dear Elaine Diaz MD,    Thank you for referring your patient Joel Pineda to the Allergy/Immunology Clinic. Joel Pineda was seen in the Allergy Clinic at [facility]. The following are my recommendations regarding his Adverse Reaction to Contrast Media    1. 50mg of prednisone should be taken 13 hours, 7 hours, and 1 hour prior to planned exposure to contrast dye  2. 50mg of diphenhydramine should be taken 1 hour prior to planned exposure to contrast dye  3. 150mg of ranitidine should be taken 13 hours and 1 hour prior to planned exposure to contrast dye  4. Continue to take cetirizine 10mg daily along with above pre-medication regimen  5. Follow-up in the allergy clinic as needed      Joel Pindea is a 43 year old White male being seen today at the request of Dr. Diaz in consultation for adverse reactions to contrast dye.  He states that after receiving contrast dye for CT scan many years ago he developed itching, flushing, and became tachycardic.  He had a similar reaction approximately 10 years ago after having an MRI with gadolinium contrast.  He developed flushing and hives after contrast was administered.  He was given Benadryl and the symptoms resolved.  Most recently he developed symptoms of facial flushing, sweating, and mild nausea after receiving an epidural injection several weeks ago.  Gadolinium was used during this procedure.  Joel was given Benadryl and his symptoms resolved within 15 minutes.  This reaction is uncharacteristic for him as he had previously been tolerating the small amount of gadolinium used during this procedure.  States that he has only reacted to larger amounts of gadolinium needed for an MRI in the past.   "Joel is here to discuss whether he should be pre-medicating specific medications prior to having any sort of contrast administered in the future.  He does not have any history of anaphylactic reactions to contrast dye or gadolinium.  Joel reports taking cetirizine daily along with hydroxyzine.  Hydroxyzine was prescribed for anxiety and sleep and he takes 50 mg every morning and 100 mg in the evening.  He has noticed increased sedation and has been holding the hydroxyzine recently to see if this will help ease his symptoms of sleepiness.      Past Medical History:   Diagnosis Date     Acne      Allergic state      Anxiety      Bipolar 2 disorder (H)      Chest pain     Chest pain, regulated w/BP meds. Clear arteries.     Chronic pain      Depressive disorder      Diabetes (H)      Diverticulosis      Gastroesophageal reflux disease      Hypertension      IBS (irritable bowel syndrome)      Intracranial arachnoid cyst      Polyneuropathy      Pulmonary embolism (H)      Skin exam, screening for cancer 12/3/2013     Sleep apnea      Uncomplicated asthma      Family History   Problem Relation Age of Onset     Musculoskeletal Disorder Mother         back     Anxiety Disorder Mother      Colon Polyps Mother      Ulcerative Colitis Mother         and ischemic small intestine, surgery     Hypertension Mother      Breast Cancer Mother      Osteoporosis Mother      Diabetes Mother         Type 2, Diagnosed in 2014     Depression Mother         Takes Cymbalta to help with chronic pain + depx     Thyroid Disease Mother         Hypothyroidism     Obesity Mother         Under much better control latter half of 2015     Musculoskeletal Disorder Father         back     Substance Abuse Father      Hypertension Father      Hyperlipidemia Father      Depression Father         Off meds for many years. Seems \"ok\"     Heart Disease Maternal Grandmother      Heart Disease Maternal Grandfather      Psychotic Disorder Paternal " Grandfather      Suicide Paternal Grandfather      Depression Paternal Grandfather         Pediatrician. Committed suicide by pistol in 1990.     Musculoskeletal Disorder Brother         back     Depression Brother         Expressed as anger and moodiness     Substance Abuse Sister      Depression Sister         Mental Health Therapist, yet no anti-depressants?     Anxiety Disorder Sister         Mental Health Therapist, yet no anti-anxiety meds?     Other Cancer Other         Bladder Cancer - Fatal     Substance Abuse Brother      Colon Cancer No family hx of      Crohn's Disease No family hx of      Anesthesia Reaction No family hx of      Cancer No family hx of         No family history of skin cancer     Past Surgical History:   Procedure Laterality Date     BACK SURGERY  10/07    lumbar discectomy L5-S1     COLONOSCOPY      Note: colonoscopy scheduled with [facility] on Friday, 9/4/15     COSMETIC SURGERY  2012    Nose Exterior - functional     GI SURGERY  August 2013    Sigmoidectomy     HERNIA REPAIR, UMBILICAL  8/23/11    small, Dr. Evan Beavers     INCISION AND DRAINAGE, ABSCESS, COMPLEX  8/23/11    umbilical, Dr. Evan Beavers     LAPAROSCOPIC ASSISTED COLECTOMY LEFT (DESCENDING)  8/15/2013    Procedure: LAPAROSCOPIC ASSISTED COLECTOMY LEFT (DESCENDING);  Laparoscopic Hand Assisted Sigmoid Resection, Mobilization of Splenic Fissure, coloproctoscopy, *Latex Free Room* Anesthesia General with Pain block  ;  Surgeon: Aurora Justice MD;  Location: [facility] OR     NERVE SURGERY  8/18/11    RF ablation @ L3-S1 @ [facility]     RECONSTRUCT NOSE AND SEPTUM (FUNCTIONAL)  10/14/2011    Procedure:RECONSTRUCT NOSE AND SEPTUM (FUNCTIONAL); Functional Septorhinoplasty, Turbinate Reduction, ; Surgeon:CEDRIC CUEVAS; Location:[facility] OR     SINUS SURGERY  10/1/01    ethmoidectomy chronic sinusitis       ENVIRONMENTAL HISTORY: [place] is heated with a forced air and gas furnace. They do  have central air conditioning. The patient's bedroom is furnished with carpeting in bedroom and allergen pillowcase cover.  Pets inside the house include 1 cat(s). There is no history of cockroach or mice infestation. There is/are 0 smokers in the house.  The house does not have a damp basement.     SOCIAL HISTORY:   Joel is on work disability. He lives alone.    REVIEW OF SYSTEMS:  General: negative for weight gain. negative for weight loss. negative for changes in sleep.   Eyes: negative for itching. negative for redness. negative for tearing/watering. negative for vision changes  Ears: positive  for fullness. positive  for hearing loss. negative for dizziness.   Nose: negative for snoring.negative for changes in smell. positive  for drainage.   Throat: negative for hoarseness. negative for sore throat. negative for trouble swallowing.   Lungs: negative for cough. positive  for shortness of breath.positive  for wheezing. positive  for sputum production.   Cardiovascular: negative for chest pain. negative for swelling of ankles. negative for fast or irregular heartbeat.   Gastrointestinal: positive  for nausea. negative for heartburn. positive  for acid reflux.   Musculoskeletal: positive  for joint pain. positive  for joint stiffness. negative for joint swelling.   Neurologic: negative for seizures. negative for fainting. negative for weakness.   Psychiatric: positive  for changes in mood. positive  for anxiety.   Endocrine: negative for cold intolerance. negative for heat intolerance. negative for tremors.   Hematologic: negative for easy bruising. negative for easy bleeding.  Integumentary: negative for rash. negative for scaling. negative for nail changes.       Current Outpatient Medications:      adalimumab (HUMIRA *CF*) 40 MG/0.4ML pen kit, Inject 0.4 mLs (40 mg) Subcutaneous every 14 days . Hold for signs of infection, then seek medical attention., Disp: 0.8 mL, Rfl: 5     albuterol (PROAIR HFA/PROVENTIL  HFA/VENTOLIN HFA) 108 (90 Base) MCG/ACT inhaler, Inhale 2 puffs into the lungs every 4 hours as needed for shortness of breath / dyspnea or wheezing, Disp: 8.5 g, Rfl: 11     ALPRAZolam (XANAX XR) 1 MG 24 hr tablet, Take 1 mg by mouth daily, Disp: , Rfl:      aspirin (ASA) 81 MG tablet, Take 81 mg by mouth daily , Disp: , Rfl:      celecoxib (CELEBREX) 200 MG capsule, TAKE 1 CAPSULE BY MOUTH TWICE DAILY AS NEEDED FOR MODERATE PAIN., Disp: 180 capsule, Rfl: 1     cholecalciferol (VITAMIN D3) 10710 units (1250 mcg) capsule, Take one capsule every two weeks., Disp: 24 capsule, Rfl: 1     clotrimazole (LOTRIMIN) 1 % external solution, Apply 1 mL topically 2 times daily Correct use is 5 drops in right ear twice weekly, Disp: 10 mL, Rfl: 3     cyanocobalamin (CYANOCOBALAMIN) 1000 MCG/ML injection, Inject 1 mL (1,000 mcg) into the muscle every 30 days, Disp: 10 mL, Rfl: 0     DULoxetine (CYMBALTA) 60 MG capsule, Take 60 mg by mouth daily, Disp: , Rfl:      fluticasone-salmeterol (ADVAIR DISKUS) 250-50 MCG/DOSE inhaler, Inhale 1 puff into the lungs 2 times daily, Disp: 1 Inhaler, Rfl: 11     Ginger, Zingiber officinalis, (GINGER ROOT) 550 MG CAPS capsule, Take 550 mg by mouth Up to three times daily, Disp: , Rfl:      lamoTRIgine (LAMICTAL) 100 MG tablet, Take 200 mg by mouth daily, Disp: , Rfl:      levothyroxine (SYNTHROID/LEVOTHROID) 75 MCG tablet, TAKE 1 TABLET EVERY MORNING, Disp: 90 tablet, Rfl: 1     lidocaine (XYLOCAINE) 5 % external ointment, UAD, Disp: 30 g, Rfl:      loperamide (IMODIUM) 2 MG capsule, Take 2 mg by mouth 4 times daily as needed for diarrhea, Disp: , Rfl:      metFORMIN (GLUCOPHAGE-XR) 500 MG 24 hr tablet, Take 2 tablets (1,000 mg) by mouth daily (with dinner), Disp: 180 tablet, Rfl: 3     metoprolol succinate ER (TOPROL-XL) 200 MG 24 hr tablet, Take 2 tablets (400 mg) by mouth daily, Disp: 180 tablet, Rfl: 1     montelukast (SINGULAIR) 10 MG tablet, Take 1 tablet (10 mg) by mouth every evening,  Disp: 90 tablet, Rfl: 1     omega-3 acid ethyl esters (LOVAZA) 1 g capsule, TAKE 2 CAPSULES BY MOUTH TWICE DAILY., Disp: 360 capsule, Rfl: 0     omeprazole 20 MG tablet, Take 20 mg by mouth daily , Disp: , Rfl:      ondansetron (ZOFRAN) 8 MG tablet, TAKE 1 TABLET BY MOUTH EVERY 8 HOURS AS NEEDED FOR NAUSEA OR VOMITING, Disp: 20 tablet, Rfl: 4     order for DME, Respironics REMSTAR 60 Series Auto CPAP 9-13 cm H2O, Wisp nasal mask w/a large cushion and a chinstrap, Disp: , Rfl:      oxyCODONE (ROXICODONE) 5 MG tablet, Take 1-2 tablets (5-10 mg) by mouth every 6 hours as needed for pain (maximum 4 tablet(s) per day), Disp: 35 tablet, Rfl: 0     pregabalin (LYRICA) 150 MG capsule, Take 1 capsule (150 mg) by mouth 2 times daily, Disp: 60 capsule, Rfl: 11     ramipril (ALTACE) 10 MG capsule, Take 1 capsule (10 mg) by mouth daily, Disp: 90 capsule, Rfl: 1     rizatriptan (MAXALT-MLT) 5 MG ODT tab, Take 1 tablet (5 mg) by mouth at onset of headache for migraine, Disp: 30 tablet, Rfl: 5     rosuvastatin (CRESTOR) 40 MG tablet, Take 1 tablet (40 mg) by mouth daily, Disp: 90 tablet, Rfl: 2     syringe, disposable, (BD TUBERCULIN SYRINGE) 1 ML MISC, Equipment being ordered: 1 ml tuberculin syringes to be used for Vitamin B12 injections., Disp: 12 each, Rfl: 1     tiZANidine (ZANAFLEX) 4 MG tablet, TAKE 1 TABLET BY MOUTH THREE TIMES DAILY AS NEEDED, Disp: 90 tablet, Rfl: 4     vitamin C (ASCORBIC ACID) 500 MG tablet, Take 500 mg by mouth daily, Disp: , Rfl:      acetaminophen 500 MG CAPS, Take 500 mg by mouth every 4 hours as needed, Disp: 60 capsule, Rfl:      blood glucose monitoring (NO BRAND SPECIFIED) meter device kit, Use to test blood sugar 2 times daily or as directed. Include test strips (2 boxes) with 3 refills (Patient not taking: Reported on 6/19/2019), Disp: 1 kit, Rfl: 0     cetirizine (ZYRTEC) 10 MG tablet, Take 1 tablet (10 mg) by mouth At Bedtime (Patient not taking: Reported on 6/11/2019), Disp: 30 tablet, Rfl:  11     ciprofloxacin-dexamethasone (CIPRODEX) 0.3-0.1 % otic suspension, Place 5 drops into the right ear twice a week, Disp: 1 Bottle, Rfl: 3     EPINEPHrine (EPIPEN/ADRENACLICK/OR ANY BX GENERIC EQUIV) 0.3 MG/0.3ML injection 2-pack, Inject 0.3 mLs (0.3 mg) into the muscle once as needed for anaphylaxis (Patient not taking: Reported on 6/19/2019), Disp: 0.6 mL, Rfl: 3     famotidine (PEPCID) 10 MG tablet, Take 10 mg by mouth Prior to administration of Humira every 2 weeks, Disp: , Rfl:      hydrOXYzine (ATARAX) 50 MG tablet, Take  mg by mouth as needed For anxiety and insomnia, Disp: , Rfl:      pseudoePHEDrine (SUDAFED) 120 MG 12 hr tablet, Take 1 tablet (120 mg) by mouth every 12 hours (Patient not taking: Reported on 6/19/2019), Disp: 28 tablet, Rfl: 0  Immunization History   Administered Date(s) Administered     FLU 6-35 months 01/03/2019     Influenza (IIV3) PF 10/15/2008, 08/21/2012, 09/09/2013     Influenza Vaccine IM 3yrs+ 4 Valent IIV4 10/02/2015, 10/11/2016, 09/07/2018     Pneumo Conj 13-V (2010&after) 10/02/2015     Pneumococcal 23 valent 10/11/2016     TD (ADULT, 7+) 10/04/2002     TDAP Vaccine (Adacel) 07/16/2010     Zoster vaccine recombinant adjuvanted (SHINGRIX) 05/29/2019     Allergies   Allergen Reactions     Amoxicillin-Pot Clavulanate Difficulty breathing     Banana Shortness Of Breath     Pt reports organic Banana is okay.      Nitroglycerin Palpitations     Penicillins Anaphylaxis     Provigil [Modafinil] Shortness Of Breath     headache     Gadolinium Hives and Itching     Patient was premedicated for the contrast allergy. He did still have a reaction a few hours after injection. Hives and itching. Dr. Gomez told tech to inform pt he should only have contrast again in the future when premedicated and at a hospital. Not at an outpatient facility.      Dye [Contrast Dye] Other (See Comments) and Hives     Moderate flushing, CT contrast     Golimumab      Hives, bradycardia, face  swelling     Neurontin [Gabapentin] Hives     Moderate hives     Nortriptyline Hives     Varicella Virus Vaccine Live      Rash     Flagyl [Metronidazole Hcl] Palpitations and Hives     Latex Rash     Metronidazole Palpitations, Other (See Comments) and Rash     dizziness (versus ciprofloxacin taken at same time)     No Clinical Screening - See Comments Rash     Nitrile gloves         EXAM:   BP (!) 132/91 (BP Location: Left arm, Patient Position: Sitting, Cuff Size: Adult Large)   Pulse 77   Wt 147.8 kg (325 lb 12.8 oz)   SpO2 98%   BMI 38.63 kg/m     GENERAL APPEARANCE: alert, cooperative and not in distress  SKIN: no rashes, no lesions  HEAD: atraumatic, normocephalic  EYES: lids and lashes normal, conjunctivae and sclerae clear, pupils equal, round, reactive to light, EOM full and intact  ENT: no scars or lesions, nasal exam showed no discharge, swelling or lesions noted, otoscopy showed external auditory canals clear, tympanic membranes normal, tongue midline and normal, soft palate, uvula, and tonsils normal  NECK: no asymmetry, masses, or scars, supple without significant adenopathy  LUNGS: unlabored respirations, no intercostal retractions or accessory muscle use, clear to auscultation without rales or wheezes  HEART: regular rate and rhythm without murmurs and normal S1 and S2  MUSCULOSKELETAL: no musculoskeletal defects are noted  NEURO: no focal deficits noted  PSYCH: does not appear depressed or anxious    WORKUP: None    ASSESSMENT/PLAN:  Joel Pineda is a 43 year old male here for evaluation of reactions to contrast dye. He has developed symptoms of flushing, sweating, itching, hives, and nausea after receiving both contrast dye and gadolinium. He reports that he had previously tolerated the small amounts of gadolinium used during prior procedures. Joel's symptoms quickly resolve after administration of antihistamines and are consistent with allergic-like reactions. We discussed that these  symptoms may be prevented in the future if he is treated with antihistamines and steroid prior to his procedure. Although he is receiving steroids as part of the epidural injections there is no contra-indication to receiving additional steroid for prophylaxis prior to these procedures.    1. 50mg of prednisone should be taken 13 hours, 7 hours, and 1 hour prior to planned exposure to contrast dye  2. 50mg of diphenhydramine should be taken 1 hour prior to planned exposure to contrast dye  3. 150mg of ranitidine should be taken 13 hours and 1 hour prior to planned exposure to contrast dye  4. Continue to take cetirizine 10mg daily along with above pre-medication regimen  5. Follow-up in the allergy clinic as needed      Chetan Mauricio MD  Allergy/Immunology  [facility] and [place], [place]      Chart documentation done in part with Dragon Voice Recognition Software. Although reviewed after completion, some word and grammatical errors may remain.    Again, thank you for allowing me to participate in the care of your patient.        Sincerely,        Chetan Mauricio MD

## 2019-06-19 NOTE — TELEPHONE ENCOUNTER
We can do the MRI  Where do you want to go to get it done?  I will put in order in the AM, and you can plan midmorning to call.    [facility] Scheduling:    [facility] [place]: [phone]    [facility]: [phone]    [facility] (including [place]): [phone]

## 2019-06-19 NOTE — PATIENT INSTRUCTIONS
If you have any questions regarding your allergies, asthma, or what we discussed during your visit today please call the allergy clinic or contact us via [employer].    [facility] RN [facility]: [phone]  [facility] Scheduling Line: [phone]  [facility] Scheduling Line: [phone]      I recommend the following pre-treatment regimen before future exposure to contrast dye:    Prednisone 50mg 13 hours, 7 hours, and 1 hour prior to exposure/procedure/imaging  Benadryl (diphenhydramine) - 1 hour prior to exposure/procedure/imaging  Zantac (ranitidine) 150mg 13 hours and 1 hour prior to exposure/procedure/imaging    You should also continue to take Zyrtec (cetirizine) daily as you have been

## 2019-06-19 NOTE — PROGRESS NOTES
Dear Elaine Diaz MD,    Thank you for referring your patient Joel Pineda to the Allergy/Immunology Clinic. Joel Pineda was seen in the Allergy Clinic at [facility]. The following are my recommendations regarding his Adverse Reaction to Contrast Media    1. 50mg of prednisone should be taken 13 hours, 7 hours, and 1 hour prior to planned exposure to contrast dye or gadolinium  2. 50mg of diphenhydramine should be taken 1 hour prior to planned exposure to contrast dye or gadolinium  3. 150mg of ranitidine should be taken 13 hours and 1 hour prior to planned exposure to contrast dye or gadolinium  4. Continue to take cetirizine 10mg daily along with above pre-medication regimen  5. Follow-up in the allergy clinic as needed      Joel Pinead is a 43 year old White male being seen today at the request of Dr. Diaz in consultation for adverse reactions to contrast dye.  He states that after receiving contrast dye for CT scan many years ago he developed itching, flushing, and became tachycardic.  He had a similar reaction approximately 10 years ago after having an MRI with gadolinium contrast.  He developed flushing and hives after contrast was administered.  He was given Benadryl and the symptoms resolved.  Most recently he developed symptoms of facial flushing, sweating, and mild nausea after receiving an epidural injection several weeks ago.  Gadolinium was used during this procedure.  Joel was given Benadryl and his symptoms resolved within 15 minutes.  This reaction is uncharacteristic for him as he had previously been tolerating the small amount of gadolinium used during this procedure.  States that he has only reacted to larger amounts of gadolinium needed for an MRI in the past.  Joel is here to discuss whether he should be pre-medicating specific medications prior to having any sort of contrast administered in the future.  He does not have any history of anaphylactic  "reactions to contrast dye or gadolinium.  Joel reports taking cetirizine daily along with hydroxyzine.  Hydroxyzine was prescribed for anxiety and sleep and he takes 50 mg every morning and 100 mg in the evening.  He has noticed increased sedation and has been holding the hydroxyzine recently to see if this will help ease his symptoms of sleepiness.      Past Medical History:   Diagnosis Date     Acne      Allergic state      Anxiety      Bipolar 2 disorder (H)      Chest pain     Chest pain, regulated w/BP meds. Clear arteries.     Chronic pain      Depressive disorder      Diabetes (H)      Diverticulosis      Gastroesophageal reflux disease      Hypertension      IBS (irritable bowel syndrome)      Intracranial arachnoid cyst      Polyneuropathy      Pulmonary embolism (H)      Skin exam, screening for cancer 12/3/2013     Sleep apnea      Uncomplicated asthma      Family History   Problem Relation Age of Onset     Musculoskeletal Disorder Mother         back     Anxiety Disorder Mother      Colon Polyps Mother      Ulcerative Colitis Mother         and ischemic small intestine, surgery     Hypertension Mother      Breast Cancer Mother      Osteoporosis Mother      Diabetes Mother         Type 2, Diagnosed in 2014     Depression Mother         Takes Cymbalta to help with chronic pain + depx     Thyroid Disease Mother         Hypothyroidism     Obesity Mother         Under much better control latter half of 2015     Musculoskeletal Disorder Father         back     Substance Abuse Father      Hypertension Father      Hyperlipidemia Father      Depression Father         Off meds for many years. Seems \"ok\"     Heart Disease Maternal Grandmother      Heart Disease Maternal Grandfather      Psychotic Disorder Paternal Grandfather      Suicide Paternal Grandfather      Depression Paternal Grandfather         Pediatrician. Committed suicide by pistol in 1990.     Musculoskeletal Disorder Brother         back     " Depression Brother         Expressed as anger and moodiness     Substance Abuse Sister      Depression Sister         Mental Health Therapist, yet no anti-depressants?     Anxiety Disorder Sister         Mental Health Therapist, yet no anti-anxiety meds?     Other Cancer Other         Bladder Cancer - Fatal     Substance Abuse Brother      Colon Cancer No family hx of      Crohn's Disease No family hx of      Anesthesia Reaction No family hx of      Cancer No family hx of         No family history of skin cancer     Past Surgical History:   Procedure Laterality Date     BACK SURGERY  10/07    lumbar discectomy L5-S1     COLONOSCOPY      Note: colonoscopy scheduled with [facility] on Friday, 9/4/15     COSMETIC SURGERY  2012    Nose Exterior - functional     GI SURGERY  August 2013    Sigmoidectomy     HERNIA REPAIR, UMBILICAL  8/23/11    [facility], Dr. Evan Beavers     INCISION AND DRAINAGE, ABSCESS, COMPLEX  8/23/11    umbilical, Dr. Evan Beavers     LAPAROSCOPIC ASSISTED COLECTOMY LEFT (DESCENDING)  8/15/2013    Procedure: LAPAROSCOPIC ASSISTED COLECTOMY LEFT (DESCENDING);  Laparoscopic Hand Assisted Sigmoid Resection, Mobilization of Splenic Fissure, coloproctoscopy, *Latex Free Room* Anesthesia General with Pain block  ;  Surgeon: Aurora Justice MD;  Location: [facility] OR     NERVE SURGERY  8/18/11    RF ablation @ L3-S1 @ [facility]     RECONSTRUCT NOSE AND SEPTUM (FUNCTIONAL)  10/14/2011    Procedure:RECONSTRUCT NOSE AND SEPTUM (FUNCTIONAL); Functional Septorhinoplasty, Turbinate Reduction, ; Surgeon:CEDRIC CUEVAS; Location:[facility] OR     SINUS SURGERY  10/1/01    ethmoidectomy chronic sinusitis       ENVIRONMENTAL HISTORY: The family lives in a [facility] [place]. The home is heated with a forced air and gas furnace. They do have central air conditioning. The patient's bedroom is furnished with carpeting in bedroom and allergen pillowcase cover.  Pets inside the house include 1 cat(s). There is no history of cockroach  or mice infestation. There is/are 0 smokers in the house.  The house does not have a damp basement.     SOCIAL HISTORY:   Joel is on work disability. He lives alone.    REVIEW OF SYSTEMS:  General: negative for weight gain. negative for weight loss. negative for changes in sleep.   Eyes: negative for itching. negative for redness. negative for tearing/watering. negative for vision changes  Ears: positive  for fullness. positive  for hearing loss. negative for dizziness.   Nose: negative for snoring.negative for changes in smell. positive  for drainage.   Throat: negative for hoarseness. negative for sore throat. negative for trouble swallowing.   Lungs: negative for cough. positive  for shortness of breath.positive  for wheezing. positive  for sputum production.   Cardiovascular: negative for chest pain. negative for swelling of ankles. negative for fast or irregular heartbeat.   Gastrointestinal: positive  for nausea. negative for heartburn. positive  for acid reflux.   Musculoskeletal: positive  for joint pain. positive  for joint stiffness. negative for joint swelling.   Neurologic: negative for seizures. negative for fainting. negative for weakness.   Psychiatric: positive  for changes in mood. positive  for anxiety.   Endocrine: negative for cold intolerance. negative for heat intolerance. negative for tremors.   Hematologic: negative for easy bruising. negative for easy bleeding.  Integumentary: negative for rash. negative for scaling. negative for nail changes.       Current Outpatient Medications:      adalimumab (HUMIRA *CF*) 40 MG/0.4ML pen kit, Inject 0.4 mLs (40 mg) Subcutaneous every 14 days . Hold for signs of infection, then seek medical attention., Disp: 0.8 mL, Rfl: 5     albuterol (PROAIR HFA/PROVENTIL HFA/VENTOLIN HFA) 108 (90 Base) MCG/ACT inhaler, Inhale 2 puffs into the lungs every 4 hours as needed for shortness of breath / dyspnea or wheezing, Disp: 8.5 g, Rfl: 11     ALPRAZolam (XANAX XR)  1 MG 24 hr tablet, Take 1 mg by mouth daily, Disp: , Rfl:      aspirin (ASA) 81 MG tablet, Take 81 mg by mouth daily , Disp: , Rfl:      celecoxib (CELEBREX) 200 MG capsule, TAKE 1 CAPSULE BY MOUTH TWICE DAILY AS NEEDED FOR MODERATE PAIN., Disp: 180 capsule, Rfl: 1     cholecalciferol (VITAMIN D3) 21561 units (1250 mcg) capsule, Take one capsule every two weeks., Disp: 24 capsule, Rfl: 1     clotrimazole (LOTRIMIN) 1 % external solution, Apply 1 mL topically 2 times daily Correct use is 5 drops in right ear twice weekly, Disp: 10 mL, Rfl: 3     cyanocobalamin (CYANOCOBALAMIN) 1000 MCG/ML injection, Inject 1 mL (1,000 mcg) into the muscle every 30 days, Disp: 10 mL, Rfl: 0     DULoxetine (CYMBALTA) 60 MG capsule, Take 60 mg by mouth daily, Disp: , Rfl:      fluticasone-salmeterol (ADVAIR DISKUS) 250-50 MCG/DOSE inhaler, Inhale 1 puff into the lungs 2 times daily, Disp: 1 Inhaler, Rfl: 11     Ginger, Zingiber officinalis, (GINGER ROOT) 550 MG CAPS capsule, Take 550 mg by mouth Up to three times daily, Disp: , Rfl:      lamoTRIgine (LAMICTAL) 100 MG tablet, Take 200 mg by mouth daily, Disp: , Rfl:      levothyroxine (SYNTHROID/LEVOTHROID) 75 MCG tablet, TAKE 1 TABLET EVERY MORNING, Disp: 90 tablet, Rfl: 1     lidocaine (XYLOCAINE) 5 % external ointment, [facility], Disp: 30 g, Rfl:      loperamide (IMODIUM) 2 MG capsule, Take 2 mg by mouth 4 times daily as needed for diarrhea, Disp: , Rfl:      metFORMIN (GLUCOPHAGE-XR) 500 MG 24 hr tablet, Take 2 tablets (1,000 mg) by mouth daily (with dinner), Disp: 180 tablet, Rfl: 3     metoprolol succinate ER (TOPROL-XL) 200 MG 24 hr tablet, Take 2 tablets (400 mg) by mouth daily, Disp: 180 tablet, Rfl: 1     montelukast (SINGULAIR) 10 MG tablet, Take 1 tablet (10 mg) by mouth every evening, Disp: 90 tablet, Rfl: 1     omega-3 acid ethyl esters (LOVAZA) 1 g capsule, TAKE 2 CAPSULES BY MOUTH TWICE DAILY., Disp: 360 capsule, Rfl: 0     omeprazole 20 MG tablet, Take 20 mg by mouth daily ,  Disp: , Rfl:      ondansetron (ZOFRAN) 8 MG tablet, TAKE 1 TABLET BY MOUTH EVERY 8 HOURS AS NEEDED FOR NAUSEA OR VOMITING, Disp: 20 tablet, Rfl: 4     order for DME, Respironics REMSTAR 60 Series Auto CPAP 9-13 cm H2O, Wisp nasal mask w/a large cushion and a chinstrap, Disp: , Rfl:      oxyCODONE (ROXICODONE) 5 MG tablet, Take 1-2 tablets (5-10 mg) by mouth every 6 hours as needed for pain (maximum 4 tablet(s) per day), Disp: 35 tablet, Rfl: 0     pregabalin (LYRICA) 150 MG capsule, Take 1 capsule (150 mg) by mouth 2 times daily, Disp: 60 capsule, Rfl: 11     ramipril (ALTACE) 10 MG capsule, Take 1 capsule (10 mg) by mouth daily, Disp: 90 capsule, Rfl: 1     rizatriptan (MAXALT-MLT) 5 MG ODT tab, Take 1 tablet (5 mg) by mouth at onset of headache for migraine, Disp: 30 tablet, Rfl: 5     rosuvastatin (CRESTOR) 40 MG tablet, Take 1 tablet (40 mg) by mouth daily, Disp: 90 tablet, Rfl: 2     syringe, disposable, (BD TUBERCULIN SYRINGE) 1 ML MISC, Equipment being ordered: 1 ml tuberculin syringes to be used for Vitamin B12 injections., Disp: 12 each, Rfl: 1     tiZANidine (ZANAFLEX) 4 MG tablet, TAKE 1 TABLET BY MOUTH THREE TIMES DAILY AS NEEDED, Disp: 90 tablet, Rfl: 4     vitamin C (ASCORBIC ACID) 500 MG tablet, Take 500 mg by mouth daily, Disp: , Rfl:      acetaminophen 500 MG CAPS, Take 500 mg by mouth every 4 hours as needed, Disp: 60 capsule, Rfl:      blood glucose monitoring (NO BRAND SPECIFIED) meter device kit, Use to test blood sugar 2 times daily or as directed. Include test strips (2 boxes) with 3 refills (Patient not taking: Reported on 6/19/2019), Disp: 1 kit, Rfl: 0     cetirizine (ZYRTEC) 10 MG tablet, Take 1 tablet (10 mg) by mouth At Bedtime (Patient not taking: Reported on 6/11/2019), Disp: 30 tablet, Rfl: 11     ciprofloxacin-dexamethasone (CIPRODEX) 0.3-0.1 % otic suspension, Place 5 drops into the right ear twice a week, Disp: 1 Bottle, Rfl: 3     EPINEPHrine (EPIPEN/ADRENACLICK/OR ANY BX GENERIC  EQUIV) 0.3 MG/0.3ML injection 2-pack, Inject 0.3 mLs (0.3 mg) into the muscle once as needed for anaphylaxis (Patient not taking: Reported on 6/19/2019), Disp: 0.6 mL, Rfl: 3     famotidine (PEPCID) 10 MG tablet, Take 10 mg by mouth Prior to administration of Humira every 2 weeks, Disp: , Rfl:      hydrOXYzine (ATARAX) 50 MG tablet, Take  mg by mouth as needed For anxiety and insomnia, Disp: , Rfl:      pseudoePHEDrine (SUDAFED) 120 MG 12 hr tablet, Take 1 tablet (120 mg) by mouth every 12 hours (Patient not taking: Reported on 6/19/2019), Disp: 28 tablet, Rfl: 0  Immunization History   Administered Date(s) Administered     FLU 6-35 months 01/03/2019     Influenza (IIV3) PF 10/15/2008, 08/21/2012, 09/09/2013     Influenza Vaccine IM 3yrs+ 4 Valent IIV4 10/02/2015, 10/11/2016, 09/07/2018     Pneumo Conj 13-V (2010&after) 10/02/2015     Pneumococcal 23 valent 10/11/2016     TD (ADULT, 7+) 10/04/2002     TDAP Vaccine (Adacel) 07/16/2010     Zoster vaccine recombinant adjuvanted (SHINGRIX) 05/29/2019     Allergies   Allergen Reactions     Amoxicillin-Pot Clavulanate Difficulty breathing     Banana Shortness Of Breath     Pt reports organic Banana is okay.      Nitroglycerin Palpitations     Penicillins Anaphylaxis     Provigil [Modafinil] Shortness Of Breath     headache     Gadolinium Hives and Itching     Patient was premedicated for the contrast allergy. He did still have a reaction a few hours after injection. Hives and itching. Dr. Gomez told tech to inform pt he should only have contrast again in the future when premedicated and at a hospital. Not at an outpatient facility.      Dye [Contrast Dye] Other (See Comments) and Hives     Moderate flushing, CT contrast     Golimumab      Hives, bradycardia, face swelling     Neurontin [Gabapentin] Hives     Moderate hives     Nortriptyline Hives     Varicella Virus Vaccine Live      Rash     Flagyl [Metronidazole Hcl] Palpitations and Hives     Latex Rash      Metronidazole Palpitations, Other (See Comments) and Rash     dizziness (versus ciprofloxacin taken at same time)     No Clinical Screening - See Comments Rash     Nitrile gloves         EXAM:   BP (!) 132/91 (BP Location: Left arm, Patient Position: Sitting, Cuff Size: Adult Large)   Pulse 77   Wt 147.8 kg (325 lb 12.8 oz)   SpO2 98%   BMI 38.63 kg/m    GENERAL APPEARANCE: alert, cooperative and not in distress  SKIN: no rashes, no lesions  HEAD: atraumatic, normocephalic  EYES: lids and lashes normal, conjunctivae and sclerae clear, pupils equal, round, reactive to light, EOM full and intact  ENT: no scars or lesions, nasal exam showed no discharge, swelling or lesions noted, otoscopy showed external auditory canals clear, tympanic membranes normal, tongue midline and normal, soft palate, uvula, and tonsils normal  NECK: no asymmetry, masses, or scars, supple without significant adenopathy  LUNGS: unlabored respirations, no intercostal retractions or accessory muscle use, clear to auscultation without rales or wheezes  HEART: regular rate and rhythm without murmurs and normal S1 and S2  MUSCULOSKELETAL: no musculoskeletal defects are noted  NEURO: no focal deficits noted  PSYCH: does not appear depressed or anxious    WORKUP: None    ASSESSMENT/PLAN:  Joel Pineda is a 43 year old male here for evaluation of reactions to contrast dye. He has developed symptoms of flushing, sweating, itching, hives, and nausea after receiving both contrast dye and gadolinium. He reports that he had previously tolerated the small amounts of gadolinium used during prior procedures. Joel's symptoms quickly resolve after administration of antihistamines and are consistent with allergic-like reactions. We discussed that these symptoms may be prevented in the future if he is treated with antihistamines and steroid prior to his procedure. Although he is receiving steroids as part of the epidural injections there is no  contra-indication to receiving additional steroid for prophylaxis prior to these procedures.    1. 50mg of prednisone should be taken 13 hours, 7 hours, and 1 hour prior to planned exposure to contrast dye or gadolinium  2. 50mg of diphenhydramine should be taken 1 hour prior to planned exposure to contrast dye or gadolinium  3. 150mg of ranitidine should be taken 13 hours and 1 hour prior to planned exposure to contrast dye or gadolinium  4. Continue to take cetirizine 10mg daily along with above pre-medication regimen  5. Follow-up in the allergy clinic as needed      Chetan Mauricio MD  Allergy/Immunology  [place], [place]      Chart documentation done in part with Dragon Voice Recognition Software. Although reviewed after completion, some word and grammatical errors may remain.

## 2019-06-23 DIAGNOSIS — I10 ESSENTIAL HYPERTENSION WITH GOAL BLOOD PRESSURE LESS THAN 140/90: Chronic | ICD-10-CM

## 2019-06-24 NOTE — TELEPHONE ENCOUNTER
"Requested Prescriptions   Pending Prescriptions Disp Refills     metoprolol succinate ER (TOPROL-XL) 200 MG 24 hr tablet [Pharmacy Med Name: METOPROLO ER TAB [id number]]  Last Written Prescription Date:  1/09/19  Last Fill Quantity: 180 tablet,  # refills: 1   Last office visit: 6/12/2019 with prescribing provider:  Dr. Lyles   Future Office Visit:     180 tablet 1     Sig: TAKE 2 TABLETS (400MG)     DAILY       Beta-Blockers Protocol Failed - 6/23/2019 11:08 PM        Failed - Blood pressure under 140/90 in past 12 months     BP Readings from Last 3 Encounters:   06/19/19 (!) 132/91   06/12/19 133/89   06/06/19 (!) 144/100                 Passed - Patient is age 6 or older        Passed - Recent (12 mo) or future (30 days) visit within the authorizing provider's specialty     Patient had office visit in the last 12 months or has a visit in the next 30 days with authorizing provider or within the authorizing provider's specialty.  See \"Patient Info\" tab in inbasket, or \"Choose Columns\" in Meds & Orders section of the refill encounter.              Passed - Medication is active on med list          "

## 2019-06-26 ENCOUNTER — MYC MEDICAL ADVICE (OUTPATIENT)
Dept: FAMILY MEDICINE | Facility: CLINIC | Age: 44
End: 2019-06-26

## 2019-06-26 RX ORDER — METOPROLOL SUCCINATE 200 MG/1
TABLET, EXTENDED RELEASE ORAL
Qty: 180 TABLET | Refills: 0 | Status: SHIPPED | OUTPATIENT
Start: 2019-06-26 | End: 2019-09-22

## 2019-06-26 NOTE — TELEPHONE ENCOUNTER
Prescription approved per [facility] Refill Protocol.  Heather White RN

## 2019-06-27 ENCOUNTER — ANCILLARY PROCEDURE (OUTPATIENT)
Dept: MRI IMAGING | Facility: CLINIC | Age: 44
End: 2019-06-27
Attending: PSYCHIATRY & NEUROLOGY
Payer: MEDICARE

## 2019-06-27 DIAGNOSIS — M54.16 LUMBAR RADICULOPATHY: ICD-10-CM

## 2019-06-27 PROCEDURE — 72148 MRI LUMBAR SPINE W/O DYE: CPT | Performed by: RADIOLOGY

## 2019-06-29 ENCOUNTER — MYC REFILL (OUTPATIENT)
Dept: FAMILY MEDICINE | Facility: CLINIC | Age: 44
End: 2019-06-29

## 2019-06-29 DIAGNOSIS — M45.8 ANKYLOSING SPONDYLITIS OF SACRAL REGION (H): ICD-10-CM

## 2019-06-29 DIAGNOSIS — M51.369 DDD (DEGENERATIVE DISC DISEASE), LUMBAR: ICD-10-CM

## 2019-06-29 DIAGNOSIS — G89.4 CHRONIC PAIN SYNDROME: Chronic | ICD-10-CM

## 2019-06-29 RX ORDER — OXYCODONE HYDROCHLORIDE 5 MG/1
5-10 TABLET ORAL EVERY 6 HOURS PRN
Qty: 35 TABLET | Refills: 0 | Status: CANCELLED | OUTPATIENT
Start: 2019-06-29

## 2019-07-01 ENCOUNTER — OFFICE VISIT (OUTPATIENT)
Dept: FAMILY MEDICINE | Facility: CLINIC | Age: 44
End: 2019-07-01
Payer: MEDICARE

## 2019-07-01 ENCOUNTER — MYC MEDICAL ADVICE (OUTPATIENT)
Dept: PALLIATIVE MEDICINE | Facility: CLINIC | Age: 44
End: 2019-07-01

## 2019-07-01 VITALS
RESPIRATION RATE: 18 BRPM | BODY MASS INDEX: 37.19 KG/M2 | OXYGEN SATURATION: 98 % | HEART RATE: 68 BPM | TEMPERATURE: 97.8 F | SYSTOLIC BLOOD PRESSURE: 125 MMHG | HEIGHT: 77 IN | DIASTOLIC BLOOD PRESSURE: 84 MMHG | WEIGHT: 315 LBS

## 2019-07-01 DIAGNOSIS — M51.369 DDD (DEGENERATIVE DISC DISEASE), LUMBAR: ICD-10-CM

## 2019-07-01 DIAGNOSIS — R11.0 NAUSEA: ICD-10-CM

## 2019-07-01 DIAGNOSIS — M45.8 ANKYLOSING SPONDYLITIS OF SACRAL REGION (H): ICD-10-CM

## 2019-07-01 DIAGNOSIS — R50.9 FEVER AND CHILLS: Primary | ICD-10-CM

## 2019-07-01 DIAGNOSIS — M21.371 RIGHT FOOT DROP: ICD-10-CM

## 2019-07-01 DIAGNOSIS — R19.7 DIARRHEA, UNSPECIFIED TYPE: ICD-10-CM

## 2019-07-01 DIAGNOSIS — R10.32 LLQ ABDOMINAL PAIN: ICD-10-CM

## 2019-07-01 DIAGNOSIS — M54.16 LUMBAR RADICULOPATHY: Primary | ICD-10-CM

## 2019-07-01 LAB
ALBUMIN SERPL-MCNC: 4.4 G/DL (ref 3.4–5)
ALP SERPL-CCNC: 100 U/L (ref 40–150)
ALT SERPL W P-5'-P-CCNC: 44 U/L (ref 0–70)
AMYLASE SERPL-CCNC: 44 U/L (ref 30–110)
ANION GAP SERPL CALCULATED.3IONS-SCNC: 7 MMOL/L (ref 3–14)
AST SERPL W P-5'-P-CCNC: 41 U/L (ref 0–45)
BASOPHILS # BLD AUTO: 0.1 10E9/L (ref 0–0.2)
BASOPHILS NFR BLD AUTO: 0.9 %
BILIRUB SERPL-MCNC: 0.5 MG/DL (ref 0.2–1.3)
BUN SERPL-MCNC: 11 MG/DL (ref 7–30)
CALCIUM SERPL-MCNC: 9.7 MG/DL (ref 8.5–10.1)
CHLORIDE SERPL-SCNC: 106 MMOL/L (ref 94–109)
CO2 SERPL-SCNC: 25 MMOL/L (ref 20–32)
CREAT SERPL-MCNC: 0.9 MG/DL (ref 0.66–1.25)
DIFFERENTIAL METHOD BLD: NORMAL
EOSINOPHIL # BLD AUTO: 0.3 10E9/L (ref 0–0.7)
EOSINOPHIL NFR BLD AUTO: 3 %
ERYTHROCYTE [DISTWIDTH] IN BLOOD BY AUTOMATED COUNT: 14.2 % (ref 10–15)
GFR SERPL CREATININE-BSD FRML MDRD: >90 ML/MIN/{1.73_M2}
GLUCOSE SERPL-MCNC: 104 MG/DL (ref 70–99)
HCT VFR BLD AUTO: 45.8 % (ref 40–53)
HGB BLD-MCNC: 15.2 G/DL (ref 13.3–17.7)
LIPASE SERPL-CCNC: 122 U/L (ref 73–393)
LYMPHOCYTES # BLD AUTO: 4.3 10E9/L (ref 0.8–5.3)
LYMPHOCYTES NFR BLD AUTO: 47.8 %
MCH RBC QN AUTO: 29.1 PG (ref 26.5–33)
MCHC RBC AUTO-ENTMCNC: 33.2 G/DL (ref 31.5–36.5)
MCV RBC AUTO: 88 FL (ref 78–100)
MONOCYTES # BLD AUTO: 1.1 10E9/L (ref 0–1.3)
MONOCYTES NFR BLD AUTO: 12 %
NEUTROPHILS # BLD AUTO: 3.3 10E9/L (ref 1.6–8.3)
NEUTROPHILS NFR BLD AUTO: 36.3 %
PLATELET # BLD AUTO: 259 10E9/L (ref 150–450)
POTASSIUM SERPL-SCNC: 4.3 MMOL/L (ref 3.4–5.3)
PROT SERPL-MCNC: 7.8 G/DL (ref 6.8–8.8)
RBC # BLD AUTO: 5.23 10E12/L (ref 4.4–5.9)
SODIUM SERPL-SCNC: 138 MMOL/L (ref 133–144)
WBC # BLD AUTO: 9 10E9/L (ref 4–11)

## 2019-07-01 PROCEDURE — 36415 COLL VENOUS BLD VENIPUNCTURE: CPT | Performed by: NURSE PRACTITIONER

## 2019-07-01 PROCEDURE — 85025 COMPLETE CBC W/AUTO DIFF WBC: CPT | Performed by: NURSE PRACTITIONER

## 2019-07-01 PROCEDURE — 80053 COMPREHEN METABOLIC PANEL: CPT | Performed by: NURSE PRACTITIONER

## 2019-07-01 PROCEDURE — 82150 ASSAY OF AMYLASE: CPT | Performed by: NURSE PRACTITIONER

## 2019-07-01 PROCEDURE — 83690 ASSAY OF LIPASE: CPT | Performed by: NURSE PRACTITIONER

## 2019-07-01 PROCEDURE — 99214 OFFICE O/P EST MOD 30 MIN: CPT | Performed by: NURSE PRACTITIONER

## 2019-07-01 RX ORDER — OXYCODONE HYDROCHLORIDE 5 MG/1
5-10 TABLET ORAL EVERY 6 HOURS PRN
Qty: 35 TABLET | Refills: 0 | Status: SHIPPED | OUTPATIENT
Start: 2019-07-01 | End: 2019-07-15

## 2019-07-01 RX ORDER — METOCLOPRAMIDE 5 MG/1
5 TABLET ORAL 3 TIMES DAILY PRN
Qty: 20 TABLET | Refills: 0 | Status: SHIPPED | OUTPATIENT
Start: 2019-07-01 | End: 2019-07-25

## 2019-07-01 ASSESSMENT — ANXIETY QUESTIONNAIRES
IF YOU CHECKED OFF ANY PROBLEMS ON THIS QUESTIONNAIRE, HOW DIFFICULT HAVE THESE PROBLEMS MADE IT FOR YOU TO DO YOUR WORK, TAKE CARE OF THINGS AT HOME, OR GET ALONG WITH OTHER PEOPLE: VERY DIFFICULT
6. BECOMING EASILY ANNOYED OR IRRITABLE: NEARLY EVERY DAY
1. FEELING NERVOUS, ANXIOUS, OR ON EDGE: MORE THAN HALF THE DAYS
GAD7 TOTAL SCORE: 15
7. FEELING AFRAID AS IF SOMETHING AWFUL MIGHT HAPPEN: NEARLY EVERY DAY
5. BEING SO RESTLESS THAT IT IS HARD TO SIT STILL: NOT AT ALL
3. WORRYING TOO MUCH ABOUT DIFFERENT THINGS: NEARLY EVERY DAY
2. NOT BEING ABLE TO STOP OR CONTROL WORRYING: MORE THAN HALF THE DAYS

## 2019-07-01 ASSESSMENT — PAIN SCALES - GENERAL: PAINLEVEL: MODERATE PAIN (4)

## 2019-07-01 ASSESSMENT — PATIENT HEALTH QUESTIONNAIRE - PHQ9
SUM OF ALL RESPONSES TO PHQ QUESTIONS 1-9: 15
5. POOR APPETITE OR OVEREATING: MORE THAN HALF THE DAYS

## 2019-07-01 ASSESSMENT — MIFFLIN-ST. JEOR: SCORE: 2462.5

## 2019-07-01 NOTE — PATIENT INSTRUCTIONS
Get CT scan done if N/V abdominal pain not improving, go to ER if significant worsening, call with questions.

## 2019-07-01 NOTE — TELEPHONE ENCOUNTER
Requested Prescriptions   Pending Prescriptions Disp Refills     oxyCODONE (ROXICODONE) 5 MG tablet 35 tablet 0     Sig: Take 1-2 tablets (5-10 mg) by mouth every 6 hours as needed for pain (maximum 4 tablet(s) per day)       There is no refill protocol information for this order          oxyCODONE (ROXICODONE) 5 MG tablet      Last Written Prescription Date:  6/12/19  Last Fill Quantity: 35,   # refills: 0  Last Office Visit: 6/12/19  Future Office visit:    Next 5 appointments (look out 90 days)    Jul 01, 2019 12:40 PM CDT  Office Visit with Jing Priest NP  [facility] ([facility]) [address] [place] [id number] [phone]           Routing refill request to provider for review/approval because:  Drug not on the [facility], [facility] or [facility] refill protocol or controlled substance

## 2019-07-01 NOTE — TELEPHONE ENCOUNTER
Marah Francis I didn't get you on the phone today.    Your MRI is not significantly different.  The previous MRI did not read the L3/4 superimposed central superiorly migrating disk extrusion.  This is the smallest of any level, and is not pushing on any nerves.  If you are still feeling the foot drop on the right, this would be happening around the L5/S1 level.  The read is the not changed there.  My recommendation would be that you see a surgeon to discuss surgical options.     Are you ok with me putting the referral in?    Ericka Diaz MD  [facility]   ------------------------------------------------------        MRI 6/27/19    Findings: There are 5 lumbar-type vertebrae assumed for the purposes  of this dictation.  The tip of the conus medullaris is at L1-L2.   Normal lumbar vertebral alignment.  There is multilevel disc height  narrowing , most pronounced at L3-S1.  Normal marrow signal. There are  degenerative changes the superior endplate of L5.     On a level by level basis:     T12-L1: No spinal canal or neuroforaminal stenosis.     L1-2: No spinal canal or neuroforaminal stenosis.     L2-3: No spinal canal or neuroforaminal stenosis.     L3-4: Mild broad-based disc bulge with a central annular fissure and  superimposed central superiorly migrating disc extrusion. Mild  bilateral facet arthropathy. Mild neural foraminal stenosis  bilaterally. Mild canal is patent.     L4-5: Broad-based disc bulge with superimposed central inferiorly  migrating disc extrusion. Mild bilateral facet arthropathy. Mild  bilateral neural foraminal stenosis. Spinal canal is patent.     L5-S1: Broad-based disc bulge with superimposed central extrusion  inferiorly that abuts the traversing left S1 nerve root without  evidence for impingement. Facet arthropathy bilaterally. Moderate to  severe left and moderate right neural foraminal stenosis. Spinal canal  is patent.

## 2019-07-01 NOTE — TELEPHONE ENCOUNTER
Controlled Substance Refill Request for Roxicodone  Problem List Complete:  Yes  Chronic pain syndrome (Chronic)   Problem Detail     Noted:  4/4/2017   Priority:  Medium   Overview Addendum 7/1/2019 12:36 PM by Yuliana Solis RN   Patient is followed by Ksenia Lyles MD for ongoing prescription of pain medication.  All refills should only be approved by this provider, or covering partner.     Medication(s): oxy 5.   Maximum quantity per month: 40  Clinic visit frequency required: Q3  months      Controlled substance agreement: 6/12/19  Encounter-Level CSA - 04/04/2017:            Controlled Substance Agreement - Scan on 4/11/2017  1:30 PM : CONTROLLED SUBSTANCE AGREEMENT (below)                   Pain Clinic evaluation in the past: Yes     DIRE Total Score(s):  No flowsheet data found.     Last [facility] website verification:  02/25/19, 7/1/19   [email or web address]    Previous Version        checked in past 3 months?  No, route to RN - completed today.    Last Written Prescription Date:  6/12/19  Last Fill Quantity: 35,  # refills: 0   Last office visit: 6/12/2019 with prescribing provider:  Dr. Lyles   Future Office Visit:   Next 5 appointments (look out 90 days)    Jul 01, 2019 12:40 PM CDT  Office Visit with Jing Priest NP  [facility] ([facility]) [address] [place] [id number]  [phone]         [place]) reviewed:  reviewed- no concerns    [place] profile:  [email or web address]    ISHA Park, RN

## 2019-07-01 NOTE — PROGRESS NOTES
Subjective     Joel Pineda is a 44 year old male who presents to clinic today for the following health issues:    HPI   ED/UC Followup:    Facility:  [facility]  Date of visit: 6/29/19  Reason for visit: Right testicular and flank pain  Current Status: LLQ pain with nausea is worsening     In the ER last weekend: Lab negative workup, WBC normal, CT abd pelvis negative-noted diverticulosis but no infection, US testicle negative.  Since the ER on 6/29/19 having worsening nausea and LLQ pain. The right abdominal pain is improved. Is stopping celebrex to see if that may improve the nausea. He has tried bentyl in the past, but he denies stomach cramping. Stool is a little darker than normal, sat had diarrhea, yesterday had diarrhea, could barely eat. Took lots of imodium, had solid stool today. Feeling feverous, but no temp. Feels like he is urinating okay, has hx of retention. CT in ER noted distended bladder then he was able to void a large amount with only 12 ml PVR. Is taking zofran q 8 hr, not helping. Also using peppermint oil, not helping much. Feels more flu-danica. No vomiting. LLQ pain goes up his esophagus. Having increase back pain, had recent back MRI and not much has changed. L-5 and S1 has severe foramen stenosis.  Was rearranging some boxes, at home when he felt pain few hours later on the right side, that sent him to the ER after he was also having N/V. Last hernia repai in 2011, umbilical.  Felt feverous when he went into the ER.         Patient Active Problem List   Diagnosis     Major depressive disorder, recurrent episode (H)     Intermittent asthma     Hyperlipidemia LDL goal <100     Chronic nonallergic rhinitis     Diverticulosis     GERD (gastroesophageal reflux disease)     Anxiety     LLOYD (obstructive sleep apnea)- mild (AHI 11)     Intracranial arachnoid cyst     Facet arthritis of cervical region     Acquired hypothyroidism     Bipolar 2 disorder (H)     Chronic midline low back pain  without sciatica     Irritable bowel syndrome with diarrhea     B12 deficiency     Essential hypertension with goal blood pressure less than 140/90     Chronic pain syndrome     Ankylosing spondylitis of sacral region (H)     Morbid obesity due to hypertriglyceridemia (H)     Fatty infiltration of liver     DDD (degenerative disc disease), lumbar     Type 2 diabetes mellitus with complication, without long-term current use of insulin (H)     Peripheral polyneuropathy     History of pulmonary embolism     Past Surgical History:   Procedure Laterality Date     BACK SURGERY  10/07    lumbar discectomy L5-S1     COLONOSCOPY      Note: colonoscopy scheduled with P on Friday, 9/4/15     COSMETIC SURGERY  2012    Nose Exterior - functional     GI SURGERY  August 2013    Sigmoidectomy     HERNIA REPAIR, UMBILICAL  8/23/11    henok, Dr. Evan Beavers     INCISION AND DRAINAGE, ABSCESS, COMPLEX  8/23/11    umbilical, Dr. Evan Beavers     LAPAROSCOPIC ASSISTED COLECTOMY LEFT (DESCENDING)  8/15/2013    Procedure: LAPAROSCOPIC ASSISTED COLECTOMY LEFT (DESCENDING);  Laparoscopic Hand Assisted Sigmoid Resection, Mobilization of Splenic Fissure, coloproctoscopy, *Latex Free Room* Anesthesia General with Pain block  ;  Surgeon: Aurora Justice MD;  Location: [facility] OR     NERVE SURGERY  8/18/11    RF ablation @ L3-S1 @ [facility]     RECONSTRUCT NOSE AND SEPTUM (FUNCTIONAL)  10/14/2011    Procedure:RECONSTRUCT NOSE AND SEPTUM (FUNCTIONAL); Functional Septorhinoplasty, Turbinate Reduction, ; Surgeon:CEDRIC CUEVAS; Location:[facility] OR     SINUS SURGERY  10/1/01    ethmoidectomy chronic sinusitis       Social History     Tobacco Use     Smoking status: Never Smoker     Smokeless tobacco: Never Used   Substance Use Topics     Alcohol use: Yes     Alcohol/week: 0.0 oz     Drinks per session: 1 or 2     Binge frequency: Weekly     Comment: occ 1/week     Family History   Problem Relation Age of Onset     Musculoskeletal Disorder Mother          "back     Anxiety Disorder Mother      Colon Polyps Mother      Ulcerative Colitis Mother         and ischemic small intestine, surgery     Hypertension Mother      Breast Cancer Mother      Osteoporosis Mother      Diabetes Mother         Type 2, Diagnosed in 2014     Depression Mother         Takes Cymbalta to help with chronic pain + depx     Thyroid Disease Mother         Hypothyroidism     Obesity Mother         Under much better control latter half of 2015     Musculoskeletal Disorder Father         back     Substance Abuse Father      Hypertension Father      Hyperlipidemia Father      Depression Father         Off meds for many years. Seems \"ok\"     Heart Disease Maternal Grandmother      Heart Disease Maternal Grandfather      Psychotic Disorder Paternal Grandfather      Suicide Paternal Grandfather      Depression Paternal Grandfather         Pediatrician. Committed suicide by pistol in 1990.     Musculoskeletal Disorder Brother         back     Depression Brother         Expressed as anger and moodiness     Substance Abuse Sister      Depression Sister         Mental Health Therapist, yet no anti-depressants?     Anxiety Disorder Sister         Mental Health Therapist, yet no anti-anxiety meds?     Other Cancer Other         Bladder Cancer - Fatal     Substance Abuse Brother      Colon Cancer No family hx of      Crohn's Disease No family hx of      Anesthesia Reaction No family hx of      Cancer No family hx of         No family history of skin cancer         Current Outpatient Medications   Medication Sig Dispense Refill     acetaminophen 500 MG CAPS Take 500 mg by mouth every 4 hours as needed 60 capsule      adalimumab (HUMIRA *CF*) 40 MG/0.4ML pen kit Inject 0.4 mLs (40 mg) Subcutaneous every 14 days . Hold for signs of infection, then seek medical attention. 0.8 mL 5     albuterol (PROAIR HFA/PROVENTIL HFA/VENTOLIN HFA) 108 (90 Base) MCG/ACT inhaler Inhale 2 puffs into the lungs every 4 hours as " needed for shortness of breath / dyspnea or wheezing 8.5 g 11     ALPRAZolam (XANAX XR) 1 MG 24 hr tablet Take 1 mg by mouth daily       aspirin (ASA) 81 MG tablet Take 81 mg by mouth daily        blood glucose monitoring (NO BRAND SPECIFIED) meter device kit Use to test blood sugar 2 times daily or as directed. Include test strips (2 boxes) with 3 refills 1 kit 0     cetirizine (ZYRTEC) 10 MG tablet Take 1 tablet (10 mg) by mouth At Bedtime 30 tablet 11     cholecalciferol (VITAMIN D3) 38301 units (1250 mcg) capsule Take one capsule every two weeks. 24 capsule 1     ciprofloxacin-dexamethasone (CIPRODEX) 0.3-0.1 % otic suspension Place 5 drops into the right ear twice a week 1 Bottle 3     clotrimazole (LOTRIMIN) 1 % external solution Apply 1 mL topically 2 times daily Correct use is 5 drops in right ear twice weekly 10 mL 3     cyanocobalamin (CYANOCOBALAMIN) 1000 MCG/ML injection Inject 1 mL (1,000 mcg) into the muscle every 30 days 10 mL 0     DULoxetine (CYMBALTA) 60 MG capsule Take 60 mg by mouth daily       EPINEPHrine (EPIPEN/ADRENACLICK/OR ANY BX GENERIC EQUIV) 0.3 MG/0.3ML injection 2-pack Inject 0.3 mLs (0.3 mg) into the muscle once as needed for anaphylaxis 0.6 mL 3     famotidine (PEPCID) 10 MG tablet Take 10 mg by mouth Prior to administration of Humira every 2 weeks       fluticasone-salmeterol (ADVAIR DISKUS) 250-50 MCG/DOSE inhaler Inhale 1 puff into the lungs 2 times daily 1 Inhaler 11     Ginger, Zingiber officinalis, (GINGER ROOT) 550 MG CAPS capsule Take 550 mg by mouth Up to three times daily       hydrOXYzine (ATARAX) 50 MG tablet Take  mg by mouth as needed For anxiety and insomnia       lamoTRIgine (LAMICTAL) 100 MG tablet Take 200 mg by mouth daily       levothyroxine (SYNTHROID/LEVOTHROID) 75 MCG tablet TAKE 1 TABLET EVERY MORNING 90 tablet 1     lidocaine (XYLOCAINE) 5 % external ointment UAD 30 g      loperamide (IMODIUM) 2 MG capsule Take 2 mg by mouth 4 times daily as needed for  diarrhea       metFORMIN (GLUCOPHAGE-XR) 500 MG 24 hr tablet Take 2 tablets (1,000 mg) by mouth daily (with dinner) 180 tablet 3     metoclopramide (REGLAN) 5 MG tablet Take 1 tablet (5 mg) by mouth 3 times daily as needed (nausea) 20 tablet 0     metoprolol succinate ER (TOPROL-XL) 200 MG 24 hr tablet TAKE 2 TABLETS (400MG)     DAILY 180 tablet 0     montelukast (SINGULAIR) 10 MG tablet Take 1 tablet (10 mg) by mouth every evening 90 tablet 1     omega-3 acid ethyl esters (LOVAZA) 1 g capsule TAKE 2 CAPSULES BY MOUTH TWICE DAILY. 360 capsule 0     omeprazole 20 MG tablet Take 20 mg by mouth daily        ondansetron (ZOFRAN) 8 MG tablet TAKE 1 TABLET BY MOUTH EVERY 8 HOURS AS NEEDED FOR NAUSEA OR VOMITING 20 tablet 4     order for [facility] RespirEpunchits REMSTAR 60 Series Auto CPAP 9-13 cm H2O, Wisp nasal mask w/a large cushion and a chinstrap       oxyCODONE (ROXICODONE) 5 MG tablet Take 1-2 tablets (5-10 mg) by mouth every 6 hours as needed for pain (maximum 4 tablet(s) per day) 35 tablet 0     pregabalin (LYRICA) 150 MG capsule Take 1 capsule (150 mg) by mouth 2 times daily 60 capsule 11     pseudoePHEDrine (SUDAFED) 120 MG 12 hr tablet Take 1 tablet (120 mg) by mouth every 12 hours 28 tablet 0     ramipril (ALTACE) 10 MG capsule Take 1 capsule (10 mg) by mouth daily 90 capsule 1     rizatriptan (MAXALT-MLT) 5 MG ODT tab Take 1 tablet (5 mg) by mouth at onset of headache for migraine 30 tablet 5     rosuvastatin (CRESTOR) 40 MG tablet Take 1 tablet (40 mg) by mouth daily 90 tablet 2     syringe, disposable, (BD TUBERCULIN SYRINGE) 1 ML MISC Equipment being ordered: 1 ml tuberculin syringes to be used for Vitamin B12 injections. 12 each 1     vitamin C (ASCORBIC ACID) 500 MG tablet Take 500 mg by mouth daily       celecoxib (CELEBREX) 200 MG capsule TAKE 1 CAPSULE BY MOUTH TWICE DAILY AS NEEDED FOR MODERATE PAIN. (Patient not taking: Reported on 7/1/2019) 180 capsule 1     tiZANidine (ZANAFLEX) 4 MG tablet TAKE 1 TABLET  "BY MOUTH THREE TIMES DAILY AS NEEDED (Patient not taking: Reported on 7/1/2019) 90 tablet 4     Allergies   Allergen Reactions     Amoxicillin-Pot Clavulanate Difficulty breathing     Banana Shortness Of Breath     Pt reports organic Banana is okay.      Nitroglycerin Palpitations     Penicillins Anaphylaxis     Provigil [Modafinil] Shortness Of Breath     headache     Gadolinium Hives and Itching     Patient was premedicated for the contrast allergy. He did still have a reaction a few hours after injection. Hives and itching. Dr. Gomez told tech to inform pt he should only have contrast again in the future when premedicated and at a hospital. Not at an outpatient facility.      Dye [Contrast Dye] Other (See Comments) and Hives     Moderate flushing, CT contrast     Golimumab      Hives, bradycardia, face swelling     Neurontin [Gabapentin] Hives     Moderate hives     Nortriptyline Hives     Varicella Virus Vaccine Live      Rash     Flagyl [Metronidazole Hcl] Palpitations and Hives     Latex Rash     Metronidazole Palpitations, Other (See Comments) and Rash     dizziness (versus ciprofloxacin taken at same time)     No Clinical Screening - See Comments Rash     Nitrile gloves       Reviewed and updated as needed this visit by Provider  Tobacco  Allergies  Meds  Problems  Med Hx  Surg Hx  Fam Hx         Review of Systems   ROS COMP: Constitutional, HEENT, cardiovascular, pulmonary, gi-as above and gu systems are negative, except as otherwise noted.      Objective    /84 (BP Location: Right arm, Patient Position: Chair, Cuff Size: Adult Large)   Pulse 68   Temp 97.8  F (36.6  C) (Oral)   Resp 18   Ht 1.956 m (6' 5\")   Wt 146.5 kg (323 lb)   SpO2 98%   BMI 38.30 kg/m    Body mass index is 38.3 kg/m .  Physical Exam   GENERAL: tired, in pain appearing, alert and no distress  EYES: Eyes grossly normal to inspection, PERRL and conjunctivae and sclerae normal  HENT: ear canals and TM's normal, " nose and mouth without ulcers or lesions  NECK: no adenopathy, no asymmetry, masses, or scars and thyroid normal to palpation  RESP: lungs clear to auscultation - no rales, rhonchi or wheezes  CV: regular rate and rhythm, normal S1 S2, no S3 or S4, no murmur, click or rub  ABDOMEN: soft, tender on right abdominal quadrants, left lower quadrant had referred pain to the right side but no rebound tenderness.  no hepatosplenomegaly, no masses and bowel sounds hypoactive  MS: no gross musculoskeletal defects noted but moving more slow than normal due to abdominal pain  SKIN: no suspicious lesions or rashes    Diagnostic Test Results:  Labs reviewed in Epic  Results for orders placed or performed in visit on 07/01/19 (from the past 24 hour(s))   CBC with platelets and differential   Result Value Ref Range    WBC 9.0 4.0 - 11.0 10e9/L    RBC Count 5.23 4.4 - 5.9 10e12/L    Hemoglobin 15.2 13.3 - 17.7 g/dL    Hematocrit 45.8 40.0 - 53.0 %    MCV 88 78 - 100 fl    MCH 29.1 26.5 - 33.0 pg    MCHC 33.2 31.5 - 36.5 g/dL    RDW 14.2 10.0 - 15.0 %    Platelet Count 259 150 - 450 10e9/L    % Neutrophils 36.3 %    % Lymphocytes 47.8 %    % Monocytes 12.0 %    % Eosinophils 3.0 %    % Basophils 0.9 %    Absolute Neutrophil 3.3 1.6 - 8.3 10e9/L    Absolute Lymphocytes 4.3 0.8 - 5.3 10e9/L    Absolute Monocytes 1.1 0.0 - 1.3 10e9/L    Absolute Eosinophils 0.3 0.0 - 0.7 10e9/L    Absolute Basophils 0.1 0.0 - 0.2 10e9/L    Diff Method Automated Method    Comprehensive metabolic panel (BMP + Alb, Alk Phos, ALT, AST, Total. Bili, TP)   Result Value Ref Range    Sodium 138 133 - 144 mmol/L    Potassium 4.3 3.4 - 5.3 mmol/L    Chloride 106 94 - 109 mmol/L    Carbon Dioxide 25 20 - 32 mmol/L    Anion Gap 7 3 - 14 mmol/L    Glucose 104 (H) 70 - 99 mg/dL    Urea Nitrogen 11 7 - 30 mg/dL    Creatinine 0.90 0.66 - 1.25 mg/dL    GFR Estimate >90 >60 mL/min/[1.73_m2]    GFR Estimate If Black >90 >60 mL/min/[1.73_m2]    Calcium 9.7 8.5 - 10.1 mg/dL     Bilirubin Total 0.5 0.2 - 1.3 mg/dL    Albumin 4.4 3.4 - 5.0 g/dL    Protein Total 7.8 6.8 - 8.8 g/dL    Alkaline Phosphatase 100 40 - 150 U/L    ALT 44 0 - 70 U/L    AST 41 0 - 45 U/L   Lipase   Result Value Ref Range    Lipase 122 73 - 393 U/L   Amylase   Result Value Ref Range    Amylase 44 30 - 110 U/L     *Note: Due to a large number of results and/or encounters for the requested time period, some results have not been displayed. A complete set of results can be found in Results Review.           Assessment & Plan     1. Fever and chills  Labs normal. Had considered another CT if elevated WBC but no fever today, monitor for now. Consider diverticulitis or other possible abdominal infection if not improving?   - CBC with platelets and differential  - Lipase  - Amylase    2. Ankylosing spondylitis of sacral region (H)  Refill given  - oxyCODONE (ROXICODONE) 5 MG tablet; Take 1-2 tablets (5-10 mg) by mouth every 6 hours as needed for pain (maximum 4 tablet(s) per day)  Dispense: 35 tablet; Refill: 0    3. DDD (degenerative disc disease), lumbar  As above  - oxyCODONE (ROXICODONE) 5 MG tablet; Take 1-2 tablets (5-10 mg) by mouth every 6 hours as needed for pain (maximum 4 tablet(s) per day)  Dispense: 35 tablet; Refill: 0    4. LLQ abdominal pain  In ER had RLQ pain which he felt was improved but upon exam had pain there. Consider CT if pain is not improving   - Comprehensive metabolic panel (BMP + Alb, Alk Phos, ALT, AST, Total. Bili, TP)  - CT Abdomen Pelvis w/o Contrast; Future    5. Diarrhea, unspecified type  He will stop the imodium and then get stool samples if on-going diarrhea   - Enteric Bacteria and Virus Panel by BIBI Stool; Future  - Clostridium difficile Toxin B PCR; Future    6. Nausea  Trial short term for nausea if zofran not helping  - metoclopramide (REGLAN) 5 MG tablet; Take 1 tablet (5 mg) by mouth 3 times daily as needed (nausea)  Dispense: 20 tablet; Refill: 0  - CT Abdomen Pelvis w/o  "Contrast; Future     BMI:   Estimated body mass index is 38.3 kg/m  as calculated from the following:    Height as of this encounter: 1.956 m (6' 5\").    Weight as of this encounter: 146.5 kg (323 lb).       Return in about 1 week (around 7/8/2019), or if symptoms worsen or fail to improve.    JOCELYN Pro, NP-C  [facility]      "

## 2019-07-02 DIAGNOSIS — R19.7 DIARRHEA, UNSPECIFIED TYPE: ICD-10-CM

## 2019-07-02 PROCEDURE — 87506 IADNA-DNA/RNA PROBE TQ 6-11: CPT | Performed by: NURSE PRACTITIONER

## 2019-07-02 ASSESSMENT — ANXIETY QUESTIONNAIRES: GAD7 TOTAL SCORE: 15

## 2019-07-02 NOTE — TELEPHONE ENCOUNTER
From: Tito Pineda      Created: 7/1/2019 6:21 PM        Hi Dr. Diaz,    Thank you for your help throughout this process. Yes, please submit a referral. Would you put in a referral to a particular provider or just a department?     If you didn't have anyone particular in mind, it looks like Dr. Emily Obando' experience with both neurosurgery and orthopedics might be ideal. My biggest concern is that a fusion would be necessary in conjunction with the decompression.    Sincerely,  Tito  [phone]

## 2019-07-02 NOTE — TELEPHONE ENCOUNTER
Tito,  I do not have a specific recommendation.  This number is a  services who schedules for many different surgeons, so you can talk to them about the provider you brought up and review the schedule.    [phone]    Ericka Diaz MD  [facility]

## 2019-07-11 ENCOUNTER — MYC MEDICAL ADVICE (OUTPATIENT)
Dept: OTOLARYNGOLOGY | Facility: CLINIC | Age: 44
End: 2019-07-11

## 2019-07-15 ENCOUNTER — MYC REFILL (OUTPATIENT)
Dept: FAMILY MEDICINE | Facility: CLINIC | Age: 44
End: 2019-07-15

## 2019-07-15 ENCOUNTER — NURSE TRIAGE (OUTPATIENT)
Dept: NURSING | Facility: CLINIC | Age: 44
End: 2019-07-15

## 2019-07-15 DIAGNOSIS — M45.8 ANKYLOSING SPONDYLITIS OF SACRAL REGION (H): ICD-10-CM

## 2019-07-15 DIAGNOSIS — M51.369 DDD (DEGENERATIVE DISC DISEASE), LUMBAR: ICD-10-CM

## 2019-07-15 NOTE — TELEPHONE ENCOUNTER
Requested Prescriptions   Pending Prescriptions Disp Refills     oxyCODONE (ROXICODONE) 5 MG tablet 35 tablet 0     Sig: Take 1-2 tablets (5-10 mg) by mouth every 6 hours as needed for pain (maximum 4 tablet(s) per day)       There is no refill protocol information for this order          oxyCODONE (ROXICODONE) 5 MG tablet      Last Written Prescription Date:  7/1/19  Last Fill Quantity: 35,   # refills: 0  Last Office Visit: 7/1/19  Future Office visit:    Next 5 appointments (look out 90 days)    Oct 02, 2019  1:20 PM CDT  [facility]il Baxter [facility]  [facility] [address]  [place] [address] [phone]           Routing refill request to provider for review/approval because:  Drug not on the [facility], [facility] or [facility] refill protocol or controlled substance

## 2019-07-16 RX ORDER — OXYCODONE HYDROCHLORIDE 5 MG/1
5-10 TABLET ORAL EVERY 6 HOURS PRN
Qty: 35 TABLET | Refills: 0 | Status: SHIPPED | OUTPATIENT
Start: 2019-07-16 | End: 2019-07-31

## 2019-07-16 NOTE — TELEPHONE ENCOUNTER
oxyCODONE (ROXICODONE) 5 MG tablet Rx placed at .  Pt informed via [facility].    Alma MASSEY)(CHEY)

## 2019-07-16 NOTE — TELEPHONE ENCOUNTER
Controlled Substance Refill Request for Oxycodone  Problem List Complete:  Yes  Problem Detail     Noted:  4/4/2017   Priority:  Medium   Overview Addendum 7/1/2019 12:36 PM by Yuliana Solis RN   Patient is followed by Ksenia Lyles MD for ongoing prescription of pain medication.  All refills should only be approved by this provider, or covering partner.     Medication(s): oxy 5.   Maximum quantity per month: 40  Clinic visit frequency required: Q3  months      Controlled substance agreement: 6/12/19  Encounter-Level CSA - 04/04/2017:            Controlled Substance Agreement - Scan on 4/11/2017  1:30 PM : CONTROLLED SUBSTANCE AGREEMENT (below)                   Pain Clinic evaluation in the past: Yes     DIRE Total Score(s):  No flowsheet data found.     Last [facility] website verification:  02/25/19, 7/1/19   [email or web address]       checked in past 3 months?  Yes 7/1/19   Last Written Prescription Date:  7/1/19  Last Fill Quantity: 35 tablets  # refills: 0   Last office visit: 7/1/2019 with prescribing provider:  7/1/19   Future Office Visit:   Next 5 appointments (look out 90 days)    Oct 02, 2019  1:20 PM CDT  [facility]  [facility] [address] [address]  [phone]         RX monitoring program ([place]) reviewed:  not reviewed/not due - last done on 7/1/19    [place] profile:  [email or web address]        Martell Chaves RN, BSN, PHN

## 2019-07-16 NOTE — TELEPHONE ENCOUNTER
"\"I was trimming a hang nail on Friday and I think I damaged the skin around the nail\".    Warm transfer to scheduling to make an appointment.    Reason for Disposition    [1] Ulcer, wound, blister, sore, or red area AND [2] new or increasing    Additional Information    Negative: Sounds like a life-threatening emergency to the triager    Negative: Caused by an animal bite    Negative: Caused by a human bite    Negative: Foreign body in skin (e.g., splinter, sliver)    Negative: Injury is a puncture wound    Negative: Followed a foot or ankle injury    Negative: SEVERE pain    Negative: Foot is cool or blue in comparison to other side    Negative: [1] Looks infected (spreading redness, red streak, or pus) AND [2] fever    Negative: Patient sounds very sick or weak to the triager    Negative: Fever > 100.5 F (38.1 C)    Negative: [1] Drainage from foot AND [2] new or increased    Negative: [1] Foul-smelling odor from foot AND [2] new or increased    Negative: [1] Spreading redness or red streak AND [2] area > 2 in. (5 cm)    Negative: [1] Purple or black skin on foot or toe AND [2] new or increased    Negative: Looks like a boil or deep ulcer    Negative: Blood glucose > 400 mg/dl (22 mmol/l)    Protocols used: DIABETES - FOOT PROBLEMS AND [id number] Jones RN  [facility]      "

## 2019-07-19 ENCOUNTER — OFFICE VISIT (OUTPATIENT)
Dept: FAMILY MEDICINE | Facility: CLINIC | Age: 44
End: 2019-07-19
Payer: MEDICARE

## 2019-07-19 VITALS
OXYGEN SATURATION: 98 % | HEART RATE: 74 BPM | DIASTOLIC BLOOD PRESSURE: 84 MMHG | HEIGHT: 77 IN | BODY MASS INDEX: 37.19 KG/M2 | WEIGHT: 315 LBS | TEMPERATURE: 98.3 F | SYSTOLIC BLOOD PRESSURE: 116 MMHG | RESPIRATION RATE: 24 BRPM

## 2019-07-19 DIAGNOSIS — L03.031 CELLULITIS OF GREAT TOE OF RIGHT FOOT: Primary | ICD-10-CM

## 2019-07-19 DIAGNOSIS — L30.9 DERMATITIS: ICD-10-CM

## 2019-07-19 DIAGNOSIS — E11.8 TYPE 2 DIABETES MELLITUS WITH COMPLICATION, WITHOUT LONG-TERM CURRENT USE OF INSULIN (H): ICD-10-CM

## 2019-07-19 PROCEDURE — 99214 OFFICE O/P EST MOD 30 MIN: CPT | Performed by: FAMILY MEDICINE

## 2019-07-19 RX ORDER — CLINDAMYCIN HCL 150 MG
150 CAPSULE ORAL 3 TIMES DAILY
Qty: 21 CAPSULE | Refills: 0 | Status: SHIPPED | OUTPATIENT
Start: 2019-07-19 | End: 2019-10-08

## 2019-07-19 RX ORDER — CEPHALEXIN 500 MG/1
500 CAPSULE ORAL
COMMUNITY
Start: 2019-07-16 | End: 2019-07-19

## 2019-07-19 RX ORDER — TRIAMCINOLONE ACETONIDE 1 MG/G
CREAM TOPICAL 2 TIMES DAILY PRN
Qty: 30 G | Refills: 2 | Status: SHIPPED | OUTPATIENT
Start: 2019-07-19 | End: 2019-08-12

## 2019-07-19 RX ORDER — DOXYCYCLINE HYCLATE 100 MG
TABLET ORAL
COMMUNITY
Start: 2019-07-16 | End: 2019-07-19

## 2019-07-19 RX ORDER — TRIAMCINOLONE ACETONIDE 1 MG/G
CREAM TOPICAL 2 TIMES DAILY
COMMUNITY
End: 2019-07-19

## 2019-07-19 ASSESSMENT — MIFFLIN-ST. JEOR: SCORE: 2467.03

## 2019-07-19 ASSESSMENT — PAIN SCALES - GENERAL: PAINLEVEL: MODERATE PAIN (4)

## 2019-07-19 NOTE — PROGRESS NOTES
"Subjective     Joel Pineda is a 44 year old male who presents to clinic today for the following health issues:    HPI   ED/UC Followup:    Facility:  [facility]  Date of visit: 7-16-19  Reason for visit: Clipped toenail and it got infected  Current Status: was prescribed abx thinks he is having a reaction to it though     SUBJECTIVE:  Here today in follow-up of cellulitis of his right great toe.  Reviewed records through [facility] ER.  This resulted as a byproduct of clipping his toenails and he became quite sore and red and was draining some pus.  Was seen through the emergency department and placed on doxycycline and Keflex.  Patient's been having a reaction but not sure exactly which medicine.  Skin is been very itchy and his lips have turned bright red.  He does have a known anaphylactic issue with penicillin but has taken both of these other medications in the past without difficulty.    Also has an itchy rash on his hands for which she is used triamcinolone successfully.  He only has a tube with the ointment and this is quite greasy during the day.    Review of systems otherwise negative.  Past medical, family, and social history reviewed and updated in chart.    OBJECTIVE:  /84 (BP Location: Right arm, Patient Position: Sitting, Cuff Size: Adult Large)   Pulse 74   Temp 98.3  F (36.8  C) (Oral)   Resp 24   Ht 1.956 m (6' 5\")   Wt 147 kg (324 lb)   SpO2 98%   BMI 38.42 kg/m    Alert, pleasant, upbeat, and in no apparent discomfort.  S1 and S2 normal, no murmurs, clicks, gallops or rubs. Regular rate and rhythm. Chest is clear; no wheezes or rales. No edema or JVD.  Skin -nonspecific bright erythema perioral but no specific lesions.  Skin on the lateral aspect of his right great toe is warm and purple with a lot of granulation tissue.  No abscess formation.  Past labs reviewed with the patient.     ASSESSMENT / PLAN:  (L03.031) Cellulitis of great toe of right foot  (primary encounter " diagnosis)  Comment: We will have him stop both antibiotics.  Most likely the reaction is to Keflex but we cannot be sure at this point.  I think we can cover enough pathogen simply using clindamycin and hot soaks.  I would say he is allergic to these 2 antibiotics at this point but will need to be judicious going forward and use one at a time.  Plan: clindamycin (CLEOCIN) 150 MG capsule        Discussed mechanism of action of the proposed medication, as well as potential effects, both good and bad.  Patient expressed understanding and agreed with treatment.     (E11.8) Type 2 diabetes mellitus with complication, without long-term current use of insulin (H)  Comment: Patient was concerned about this is a risk factor for a foot infection but his has been very new and well-controlled  Plan:     (L30.9) Dermatitis  Comment:   Plan: triamcinolone (KENALOG) 0.1 % external cream            Follow up 1 week if not improving  S. Shady Lyles MD    (Chart documentation completed in part with Dragon voice-recognition software.  Even though reviewed some grammatical, spelling, and word errors may remain.)

## 2019-07-21 ENCOUNTER — NURSE TRIAGE (OUTPATIENT)
Dept: NURSING | Facility: CLINIC | Age: 44
End: 2019-07-21

## 2019-07-21 ENCOUNTER — HOSPITAL ENCOUNTER (EMERGENCY)
Facility: CLINIC | Age: 44
Discharge: HOME | End: 2019-07-21
Attending: EMERGENCY MEDICINE | Admitting: EMERGENCY MEDICINE
Payer: MEDICARE

## 2019-07-21 VITALS
WEIGHT: 315 LBS | DIASTOLIC BLOOD PRESSURE: 77 MMHG | RESPIRATION RATE: 16 BRPM | BODY MASS INDEX: 38.42 KG/M2 | HEART RATE: 67 BPM | OXYGEN SATURATION: 92 % | SYSTOLIC BLOOD PRESSURE: 125 MMHG | TEMPERATURE: 98.5 F

## 2019-07-21 DIAGNOSIS — R19.7 DIARRHEA, UNSPECIFIED TYPE: ICD-10-CM

## 2019-07-21 LAB
ALBUMIN SERPL-MCNC: 4.2 G/DL (ref 3.4–5)
ALBUMIN UR-MCNC: NEGATIVE MG/DL
ALP SERPL-CCNC: 102 U/L (ref 40–150)
ALT SERPL W P-5'-P-CCNC: 49 U/L (ref 0–70)
ANION GAP SERPL CALCULATED.3IONS-SCNC: 5 MMOL/L (ref 3–14)
APPEARANCE UR: CLEAR
AST SERPL W P-5'-P-CCNC: 45 U/L (ref 0–45)
BASOPHILS # BLD AUTO: 0.1 10E9/L (ref 0–0.2)
BASOPHILS NFR BLD AUTO: 0.8 %
BILIRUB SERPL-MCNC: 0.6 MG/DL (ref 0.2–1.3)
BILIRUB UR QL STRIP: NEGATIVE
BUN SERPL-MCNC: 9 MG/DL (ref 7–30)
CALCIUM SERPL-MCNC: 9.1 MG/DL (ref 8.5–10.1)
CHLORIDE SERPL-SCNC: 109 MMOL/L (ref 94–109)
CO2 SERPL-SCNC: 28 MMOL/L (ref 20–32)
COLOR UR AUTO: COLORLESS
CREAT SERPL-MCNC: 0.84 MG/DL (ref 0.66–1.25)
DIFFERENTIAL METHOD BLD: NORMAL
EOSINOPHIL # BLD AUTO: 0.2 10E9/L (ref 0–0.7)
EOSINOPHIL NFR BLD AUTO: 2.2 %
ERYTHROCYTE [DISTWIDTH] IN BLOOD BY AUTOMATED COUNT: 13.1 % (ref 10–15)
GFR SERPL CREATININE-BSD FRML MDRD: >90 ML/MIN/{1.73_M2}
GLUCOSE SERPL-MCNC: 100 MG/DL (ref 70–99)
GLUCOSE UR STRIP-MCNC: NEGATIVE MG/DL
HCT VFR BLD AUTO: 46.7 % (ref 40–53)
HGB BLD-MCNC: 15.1 G/DL (ref 13.3–17.7)
HGB UR QL STRIP: ABNORMAL
IMM GRANULOCYTES # BLD: 0 10E9/L (ref 0–0.4)
IMM GRANULOCYTES NFR BLD: 0.3 %
KETONES UR STRIP-MCNC: NEGATIVE MG/DL
LACTATE BLD-SCNC: 1.1 MMOL/L (ref 0.7–2)
LEUKOCYTE ESTERASE UR QL STRIP: NEGATIVE
LIPASE SERPL-CCNC: 112 U/L (ref 73–393)
LYMPHOCYTES # BLD AUTO: 4.4 10E9/L (ref 0.8–5.3)
LYMPHOCYTES NFR BLD AUTO: 45.6 %
MCH RBC QN AUTO: 28.1 PG (ref 26.5–33)
MCHC RBC AUTO-ENTMCNC: 32.3 G/DL (ref 31.5–36.5)
MCV RBC AUTO: 87 FL (ref 78–100)
MONOCYTES # BLD AUTO: 0.9 10E9/L (ref 0–1.3)
MONOCYTES NFR BLD AUTO: 9.5 %
NEUTROPHILS # BLD AUTO: 4 10E9/L (ref 1.6–8.3)
NEUTROPHILS NFR BLD AUTO: 41.6 %
NITRATE UR QL: NEGATIVE
NRBC # BLD AUTO: 0 10*3/UL
NRBC BLD AUTO-RTO: 0 /100
PH UR STRIP: 6 PH (ref 5–7)
PLATELET # BLD AUTO: 272 10E9/L (ref 150–450)
POTASSIUM SERPL-SCNC: 4.3 MMOL/L (ref 3.4–5.3)
PROT SERPL-MCNC: 7.5 G/DL (ref 6.8–8.8)
RBC # BLD AUTO: 5.37 10E12/L (ref 4.4–5.9)
RBC #/AREA URNS AUTO: <1 /HPF (ref 0–2)
SODIUM SERPL-SCNC: 142 MMOL/L (ref 133–144)
SOURCE: ABNORMAL
SP GR UR STRIP: 1 (ref 1–1.03)
UROBILINOGEN UR STRIP-MCNC: 0 MG/DL (ref 0–2)
WBC # BLD AUTO: 9.6 10E9/L (ref 4–11)
WBC #/AREA URNS AUTO: <1 /HPF (ref 0–5)

## 2019-07-21 PROCEDURE — 99284 EMERGENCY DEPT VISIT MOD MDM: CPT | Mod: 25 | Performed by: EMERGENCY MEDICINE

## 2019-07-21 PROCEDURE — 83605 ASSAY OF LACTIC ACID: CPT | Performed by: EMERGENCY MEDICINE

## 2019-07-21 PROCEDURE — 87493 C DIFF AMPLIFIED PROBE: CPT | Performed by: EMERGENCY MEDICINE

## 2019-07-21 PROCEDURE — 99284 EMERGENCY DEPT VISIT MOD MDM: CPT | Mod: Z6 | Performed by: EMERGENCY MEDICINE

## 2019-07-21 PROCEDURE — 81001 URINALYSIS AUTO W/SCOPE: CPT | Performed by: EMERGENCY MEDICINE

## 2019-07-21 PROCEDURE — 80053 COMPREHEN METABOLIC PANEL: CPT | Performed by: EMERGENCY MEDICINE

## 2019-07-21 PROCEDURE — 85025 COMPLETE CBC W/AUTO DIFF WBC: CPT | Performed by: EMERGENCY MEDICINE

## 2019-07-21 PROCEDURE — 25000128 H RX IP 250 OP 636: Performed by: EMERGENCY MEDICINE

## 2019-07-21 PROCEDURE — 96374 THER/PROPH/DIAG INJ IV PUSH: CPT | Performed by: EMERGENCY MEDICINE

## 2019-07-21 PROCEDURE — 83690 ASSAY OF LIPASE: CPT | Performed by: EMERGENCY MEDICINE

## 2019-07-21 PROCEDURE — 96361 HYDRATE IV INFUSION ADD-ON: CPT | Performed by: EMERGENCY MEDICINE

## 2019-07-21 RX ORDER — METOCLOPRAMIDE HYDROCHLORIDE 5 MG/ML
10 INJECTION INTRAMUSCULAR; INTRAVENOUS ONCE
Status: COMPLETED | OUTPATIENT
Start: 2019-07-21 | End: 2019-07-21

## 2019-07-21 RX ADMIN — METOCLOPRAMIDE 10 MG: 5 INJECTION, SOLUTION INTRAMUSCULAR; INTRAVENOUS at 19:57

## 2019-07-21 RX ADMIN — SODIUM CHLORIDE 1000 ML: 9 INJECTION, SOLUTION INTRAVENOUS at 19:57

## 2019-07-21 NOTE — ED AVS SNAPSHOT
[facility] Emergency Department  [address] [address]  Phone:  [phone]  Fax:  [phone]                                    Joel Pineda   MRN: [id number]    Department:  [facility] Emergency Department   Date of Visit:  7/21/2019           After Visit Summary Signature Page    I have received my discharge instructions, and my questions have been answered. I have discussed any challenges I see with this plan with the nurse or doctor.    ..........................................................................................................................................  Patient/Patient Representative Signature      ..........................................................................................................................................  Patient Representative Print Name and Relationship to Patient    ..................................................               ................................................  Date                                   Time    ..........................................................................................................................................  Reviewed by Signature/Title    ...................................................              ..............................................  Date                                               Time          22EPIC Rev 08/18

## 2019-07-21 NOTE — TELEPHONE ENCOUNTER
Patient report he is currently on an antibiotic for cellulitis of his foot.  Patient calls with complaints of diarrhea, nausea and abdominal cramping since yesterday.  Patient reports 4-5 bouts of diarrhea, is taking imodium, Zofran and Pepcid for GI distress.  Patient reports toe does not look worse.  Reviewed care advice with caller.  FNA advised patient should be evaluated within 24 hours.    FNA advised caller to monitor symptoms and call back with worsening symptoms or if caller has questions/concerns.  Caller verbalizes understanding.      Reason for Disposition    Weak immune system (e.g., HIV positive, cancer chemo, splenectomy, organ transplant, chronic steroids)    Additional Information    Negative: Shock suspected (e.g., cold/pale/clammy skin, too weak to stand, low BP, rapid pulse)    Negative: Difficult to awaken or acting confused (e.g., disoriented, slurred speech)    Negative: Sounds like a life-threatening emergency to the triager    Negative: Vomiting also present and worse than the diarrhea    Negative: [1] Blood in stool AND [2] without diarrhea    Negative: Diarrhea in a cancer patient who is currently (or recently) receiving chemotherapy or radiation therapy, or cancer patient who has metastatic or end-stage cancer and is receiving palliative care    Negative: [1] SEVERE abdominal pain (e.g., excruciating) AND [2] present > 1 hour    Negative: [1] SEVERE abdominal pain AND [2] age > 60    Negative: [1] Blood in the stool AND [2] moderate or large amount of blood    Negative: Black or tarry bowel movements  (Exception: chronic-unchanged  black-grey bowel movements AND is taking iron pills or Pepto-bismol)    Negative: [1] Drinking very little AND [2] dehydration suspected (e.g., no urine > 12 hours, very dry mouth, very lightheaded)    Negative: Patient sounds very sick or weak to the triager    Negative: [1] SEVERE diarrhea (e.g., 7 or more times / day more than normal) AND [2]  age > 60  years    Negative: [1] Constant abdominal pain AND [2] present > 2 hours    Negative: [1] Fever > 103 F (39.4 C) AND [2] not able to get the fever down using Fever Care Advice    Negative: [1] SEVERE diarrhea (e.g., 7 or more times / day more than normal) AND [2] present > 24 hours (1 day)    Negative: [1] MODERATE diarrhea (e.g., 4-6 times / day more than normal) AND [2] present > 48 hours (2 days)    Negative: [1] MODERATE diarrhea (e.g., 4-6 times / day more than normal) AND [2] age > 70 years    Negative: Fever > 101 F (38.3 C)    Negative: Fever present > 3 days (72 hours)    Negative: Abdominal pain  (Exception: Pain clears with each passage of diarrhea stool)    Negative: [1] Blood in the stool AND [2] small amount of blood   (Exception: only on toilet paper. Reason: diarrhea can cause rectal irritation with blood on wiping)    Negative: [1] Mucus or pus in stool AND [2] present > 2 days AND [3] diarrhea is more than mild    Negative: [1] Recent antibiotic therapy (i.e., within last 2 months) AND [2] diarrhea present > 3 days since antibiotic was stopped    Negative: [1] Recent hospitalization AND [2] diarrhea present > 3 days    Protocols used: DIARRHEA-A-

## 2019-07-22 ENCOUNTER — PATIENT OUTREACH (OUTPATIENT)
Dept: CARE COORDINATION | Facility: CLINIC | Age: 44
End: 2019-07-22

## 2019-07-22 DIAGNOSIS — R19.7 DIARRHEA: Primary | ICD-10-CM

## 2019-07-22 LAB
C DIFF TOX B STL QL: NEGATIVE
SPECIMEN SOURCE: NORMAL

## 2019-07-22 ASSESSMENT — ACTIVITIES OF DAILY LIVING (ADL): DEPENDENT_IADLS:: INDEPENDENT

## 2019-07-22 NOTE — ED NOTES
Pt has been on abx for a toe infection. Now has lower abdominal pain, diarrhea, nausea and dizziness.

## 2019-07-22 NOTE — PROGRESS NOTES
Clinic Care Coordination Contact    Clinic Care Coordination Contact  OUTREACH    Referral Information:  Referral Source: ED Follow-Up    Primary Diagnosis: GI Disorders  Chief Complaint   Patient presents with     Clinic Care Coordination - Post Hospital     ED visit     universal Utilization: ED visit at [facility] on 7/21/19 due to diarrhea  Clinic Utilization  Difficulty keeping appointments:: No  Compliance Concerns: No  No-Show Concerns: No  No PCP office visit in Past Year: No  Utilization    Last refreshed: 7/21/2019 11:34 PM:  Hospital Admissions 0           Last refreshed: 7/21/2019 11:34 PM:  ED Visits 2           Last refreshed: 7/21/2019 11:34 PM:  No Show Count (past year) 0              Current as of: 7/21/2019 11:34 PM          Clinical Concerns:    Current Medical Concerns:  Patient was seen in ED on 7/16/19 for cellulitis of great toe started on two antibiotics, patient developed rash and saw PCP on 7/19/19. Original antibiotics were stopped and he was started on Clindamycin 150 mg TID.    Patient states he slept for 18 hours on Saturday due to feeling poorly. He then developed nausea dn diarrhea and presented to the ED.     He states he is still having some diarrhea, but felling better. He was told he could stop the Clindamycin by the ED doc but he is not comfortable doing that as he does not feel his toe is infection free at this time. He is soaking four times a day.     He states the diarrhea is manageable now. He is using immodium as needed.           Current Behavioral Concerns: No concerns      Education Provided to patient: Diet and fluids     Pain  Pain (GOAL):: No  Health Maintenance Reviewed:      Clinical Pathway: None    Medication Management:  Patient is independent in medication management     Functional Status:  Dependent ADLs:: Independent  Dependent IADLs:: Independent  Bed or wheelchair confined:: No  Mobility Status: Independent  Fallen 2 or more times in the past year?:  No  Any fall with injury in the past year?: No    Living Situation:  Current living arrangement:: I live alone    Diet/Exercise/Sleep:  Diet:: Diabetic diet  Inadequate nutrition (GOAL):: No  Food Insecurity: No  Tube Feeding: No  Exercise:: Currently not exercising  Inadequate activity/exercise (GOAL):: No  Significant changes in sleep pattern (GOAL): No    Transportation:  Transportation concerns (GOAL):: No  Transportation means:: Regular car     Psychosocial:  Alevism or spiritual beliefs that impact treatment:: No  Mental health DX:: Yes  Mental health DX how managed:: Medication, Psychiatrist, Outpatient Counseling  Mental health management concern (GOAL):: No  Informal Support system:: Family     Financial/Insurance:   Financial/Insurance concerns (GOAL):: No     Resources and Interventions:  Current Resources:    Community Resources: None  Supplies used at home:: Diabetic Supplies  Equipment Currently Used at Home: glucometer    Advance Care Plan/Directive  Advanced Care Plans/Directives on file:: No  Advanced Care Plan/Directive Status: Not Applicable    Referrals Placed: None    Patient/Caregiver understanding: Good understanding   Future Appointments              Tomorrow Radha Mcdonald [facility] [place] [place]    In 3 weeks Emily Obando MD [facility] Neurosurgery, [facility]    In 2 months [facility]    In 2 months Oneil Baxter MD [facility] [place]        Plan: Patient will return to PCP clinic if symptoms continue.     Will not open to clinic care coordination.     Mary Stout RN, [facility] - Primary Care Clinic RN Coordinator  [facility]   7/22/2019    9:27 AM  [phone]

## 2019-07-22 NOTE — DISCHARGE INSTRUCTIONS
Await C. difficile testing.  Diet as tolerated.  You may hold clindamycin, warm packs to right toe for the next few days.  Restart clindamycin if right toe becomes red swollen painful or with purulent discharge.    Return here/recheck for vomiting, abdominal pain or other concern.

## 2019-07-22 NOTE — ED PROVIDER NOTES
"  History     Chief Complaint   Patient presents with     Abdominal Pain     diarrhea, nausea dizzy no emesis was placed on antibotic from [facility]     HPI  Joel Pineda is a 44 year old male with a history of type 2 diabetes mellitus who presents to the ED with abdominal pain and nausea. The patient reports he was seen at the [facility] ED on 7/16/19 for an infection of his right great toe which he injured on 7/14/19 while filing his nails. He was prescribed cephalexin and doxycycline for the infection. The patient states he had a reaction/sensitivity to one of the antibiotics and was seen by his primary care provider on 7/19/19. He is unsure which of the antibiotics caused the reaction/sensitivity. The patient recalls having a rash and states he cannot remember what other symptoms were a result of the reaction/sensitivity. His primary care provider, Dr. Ksenia Lyles, took him off the cephalexin and doxycycline and prescribed him 150 mg clindamycin 3 times daily. The patient states he had to pull over to the side of the road on his way to the ED around 2 pm today because he was nauseas. He took zofran and pepcid which provided some relief. He states that at 2:30 pm he had an episode of diarrhea and \"emptied like a faucet.\" He now feels like he has been punched in the stomach. He did not look at his stools today but states that have been dark recently. He reports pain in the abdomen that is radiating to the groin. The patient recalls feeling similar symptoms yesterday, noting that he slept for 18 hours, but he states the symptoms today came on abruptly. He recalls previous episodes of diverticulitis but states he has no pain in the left lower quadrant. The patient still has his appendix. He had a \"little bit\" of urine output 3-4 hours ago. He reports feeling feverish this morning so he took some tylenol which he felt provided relief. He reports a history of sigmoidectomy, abdominal abscess parallel to " the skin, and an umbilical hernia. He denies sick contact or recent travel. The patient denies use of alcohol, cigarettes, and drugs. He recalls having a CT a couple of weeks ago and reports that no stones were seen. He takes Humira for ankylosing spondylitis.     Allergies:  Allergies   Allergen Reactions     Amoxicillin-Pot Clavulanate Difficulty breathing     Banana Shortness Of Breath     Pt reports organic Banana is okay.      Nitroglycerin Palpitations     Penicillins Anaphylaxis     Provigil [Modafinil] Shortness Of Breath     headache     Gadolinium Hives and Itching     Patient was premedicated for the contrast allergy. He did still have a reaction a few hours after injection. Hives and itching. Dr. Gomez told tech to inform pt he should only have contrast again in the future when premedicated and at a hospital. Not at an outpatient facility.      Dye [Contrast Dye] Other (See Comments) and Hives     Moderate flushing, CT contrast     Golimumab      Hives, bradycardia, face swelling     Neurontin [Gabapentin] Hives     Moderate hives     Nortriptyline Hives     Varicella Virus Vaccine Live      Rash     Flagyl [Metronidazole Hcl] Palpitations and Hives     Latex Rash     Metronidazole Palpitations, Other (See Comments) and Rash     dizziness (versus ciprofloxacin taken at same time)     No Clinical Screening - See Comments Rash     Nitrile gloves       Problem List:           Past Medical History:    Past Medical History:   Diagnosis Date     Acne      Allergic state      Anxiety      Bipolar 2 disorder (H)      Chest pain      Chronic pain      Depressive disorder      Diabetes (H)      Diverticulosis      Gastroesophageal reflux disease      Hypertension      IBS (irritable bowel syndrome)      Intracranial arachnoid cyst      Polyneuropathy      Pulmonary embolism (H)      Skin exam, screening for cancer 12/3/2013     Sleep apnea      Uncomplicated asthma        Past Surgical History:    Past  "Surgical History:   Procedure Laterality Date     BACK SURGERY  10/07    lumbar discectomy L5-S1     COLONOSCOPY      Note: colonoscopy scheduled with [facility] on Friday, 9/4/15     COSMETIC SURGERY  2012    Nose Exterior - functional     GI SURGERY  August 2013    Sigmoidectomy     HERNIA REPAIR, UMBILICAL  8/23/11    [place], Dr. Evan Beavers     INCISION AND DRAINAGE, ABSCESS, COMPLEX  8/23/11    umbilical, Dr. Evan Beavers     LAPAROSCOPIC ASSISTED COLECTOMY LEFT (DESCENDING)  8/15/2013    Procedure: LAPAROSCOPIC ASSISTED COLECTOMY LEFT (DESCENDING);  Laparoscopic Hand Assisted Sigmoid Resection, Mobilization of Splenic Fissure, coloproctoscopy, *Latex Free Room* Anesthesia General with Pain block  ;  Surgeon: Aurora Justice MD;  Location: [facility] OR     NERVE SURGERY  8/18/11    RF ablation @ L3-S1 @ [facility]     RECONSTRUCT NOSE AND SEPTUM (FUNCTIONAL)  10/14/2011    Procedure:RECONSTRUCT NOSE AND SEPTUM (FUNCTIONAL); Functional Septorhinoplasty, Turbinate Reduction, ; Surgeon:CEDRIC CUEVAS; Location:[facility] OR     SINUS SURGERY  10/1/01    ethmoidectomy chronic sinusitis       Family History:    Family History   Problem Relation Age of Onset     Musculoskeletal Disorder Mother         back     Anxiety Disorder Mother      Colon Polyps Mother      Ulcerative Colitis Mother         and ischemic small intestine, surgery     Hypertension Mother      Breast Cancer Mother      Osteoporosis Mother      Diabetes Mother         Type 2, Diagnosed in 2014     Depression Mother         Takes Cymbalta to help with chronic pain + depx     Thyroid Disease Mother         Hypothyroidism     Obesity Mother         Under much better control latter half of 2015     Musculoskeletal Disorder Father         back     Substance Abuse Father      Hypertension Father      Hyperlipidemia Father      Depression Father         Off meds for many years. Seems \"ok\"     Heart Disease Maternal Grandmother      Heart Disease Maternal Grandfather      " Psychotic Disorder Paternal Grandfather      Suicide Paternal Grandfather      Depression Paternal Grandfather         Pediatrician. Committed suicide by pistol in 1990.     Musculoskeletal Disorder Brother         back     Depression Brother         Expressed as anger and moodiness     Substance Abuse Sister      Depression Sister         Mental Health Therapist, yet no anti-depressants?     Anxiety Disorder Sister         Mental Health Therapist, yet no anti-anxiety meds?     Other Cancer Other         Bladder Cancer - Fatal     Substance Abuse Brother      Colon Cancer No family hx of      Crohn's Disease No family hx of      Anesthesia Reaction No family hx of      Cancer No family hx of         No family history of skin cancer       Social History:  Marital Status:  Single [1]  Social History     Tobacco Use     Smoking status: Never Smoker     Smokeless tobacco: Never Used   Substance Use Topics     Alcohol use: Yes     Alcohol/week: 0.0 oz     Drinks per session: 1 or 2     Binge frequency: Weekly     Comment: occ 1/week     Drug use: No        Medications:      acetaminophen 500 MG CAPS   adalimumab (HUMIRA *CF*) 40 MG/0.4ML pen kit   albuterol (PROAIR HFA/PROVENTIL HFA/VENTOLIN HFA) 108 (90 Base) MCG/ACT inhaler   ALPRAZolam (XANAX XR) 1 MG 24 hr tablet   aspirin (ASA) 81 MG tablet   blood glucose monitoring (NO BRAND SPECIFIED) meter device kit   celecoxib (CELEBREX) 200 MG capsule   cetirizine (ZYRTEC) 10 MG tablet   cholecalciferol (VITAMIN D3) 29132 units (1250 mcg) capsule   ciprofloxacin-dexamethasone (CIPRODEX) 0.3-0.1 % otic suspension   clindamycin (CLEOCIN) 150 MG capsule   clotrimazole (LOTRIMIN) 1 % external solution   cyanocobalamin (CYANOCOBALAMIN) 1000 MCG/ML injection   DULoxetine (CYMBALTA) 60 MG capsule   EPINEPHrine (EPIPEN/ADRENACLICK/OR ANY BX GENERIC EQUIV) 0.3 MG/0.3ML injection 2-pack   famotidine (PEPCID) 10 MG tablet   fluticasone-salmeterol (ADVAIR DISKUS) 250-50 MCG/DOSE inhaler    Libia, Zingiber officinalis, (LIBIA ROOT) 550 MG CAPS capsule   hydrOXYzine (ATARAX) 50 MG tablet   lamoTRIgine (LAMICTAL) 100 MG tablet   levothyroxine (SYNTHROID/LEVOTHROID) 75 MCG tablet   lidocaine (XYLOCAINE) 5 % external ointment   loperamide (IMODIUM) 2 MG capsule   metFORMIN (GLUCOPHAGE-XR) 500 MG 24 hr tablet   metoclopramide (REGLAN) 5 MG tablet   metoprolol succinate ER (TOPROL-XL) 200 MG 24 hr tablet   montelukast (SINGULAIR) 10 MG tablet   omega-3 acid ethyl esters (LOVAZA) 1 g capsule   omeprazole 20 MG tablet   ondansetron (ZOFRAN) 8 MG tablet   order for DME   oxyCODONE (ROXICODONE) 5 MG tablet   pregabalin (LYRICA) 150 MG capsule   pseudoePHEDrine (SUDAFED) 120 MG 12 hr tablet   ramipril (ALTACE) 10 MG capsule   rizatriptan (MAXALT-MLT) 5 MG ODT tab   rosuvastatin (CRESTOR) 40 MG tablet   syringe, disposable, (BD TUBERCULIN SYRINGE) 1 ML MISC   tiZANidine (ZANAFLEX) 4 MG tablet   triamcinolone (KENALOG) 0.1 % external cream   vitamin C (ASCORBIC ACID) 500 MG tablet         Review of Systems   All other systems are reviewed and are negative.    Physical Exam   BP: 141/87  Pulse: 67  Heart Rate: 72  Temp: 98.5  F (36.9  C)  Resp: 16  Weight: 147 kg (324 lb)  SpO2: 98 %      Physical Exam  Nontoxic appearing no respiratory distress alert and oriented ×3  Head atraumatic normocephalic   Neck supple full active painless range of motion  Lungs clear to auscultation  Heart regular no murmur  Abdomen soft nontender bowel sounds positive no masses or HSM  Strength and sensation grossly intact throughout the extremities, gait and station normal  Speech is fluent, good eye contact, thought processes are rational  Right great toe is essentially unremarkable, there is scant faint redness to the medial border of the nail    ED Course        Procedures               Critical Care time:  none               Results for orders placed or performed during the hospital encounter of 07/21/19 (from the past 24  hour(s))   CBC with platelets differential   Result Value Ref Range    WBC 9.6 4.0 - 11.0 10e9/L    RBC Count 5.37 4.4 - 5.9 10e12/L    Hemoglobin 15.1 13.3 - 17.7 g/dL    Hematocrit 46.7 40.0 - 53.0 %    MCV 87 78 - 100 fl    MCH 28.1 26.5 - 33.0 pg    MCHC 32.3 31.5 - 36.5 g/dL    RDW 13.1 10.0 - 15.0 %    Platelet Count 272 150 - 450 10e9/L    Diff Method Automated Method     % Neutrophils 41.6 %    % Lymphocytes 45.6 %    % Monocytes 9.5 %    % Eosinophils 2.2 %    % Basophils 0.8 %    % Immature Granulocytes 0.3 %    Nucleated RBCs 0 0 /100    Absolute Neutrophil 4.0 1.6 - 8.3 10e9/L    Absolute Lymphocytes 4.4 0.8 - 5.3 10e9/L    Absolute Monocytes 0.9 0.0 - 1.3 10e9/L    Absolute Eosinophils 0.2 0.0 - 0.7 10e9/L    Absolute Basophils 0.1 0.0 - 0.2 10e9/L    Abs Immature Granulocytes 0.0 0 - 0.4 10e9/L    Absolute Nucleated RBC 0.0    Comprehensive metabolic panel   Result Value Ref Range    Sodium 142 133 - 144 mmol/L    Potassium 4.3 3.4 - 5.3 mmol/L    Chloride 109 94 - 109 mmol/L    Carbon Dioxide 28 20 - 32 mmol/L    Anion Gap 5 3 - 14 mmol/L    Glucose 100 (H) 70 - 99 mg/dL    Urea Nitrogen 9 7 - 30 mg/dL    Creatinine 0.84 0.66 - 1.25 mg/dL    GFR Estimate >90 >60 mL/min/[1.73_m2]    GFR Estimate If Black >90 >60 mL/min/[1.73_m2]    Calcium 9.1 8.5 - 10.1 mg/dL    Bilirubin Total 0.6 0.2 - 1.3 mg/dL    Albumin 4.2 3.4 - 5.0 g/dL    Protein Total 7.5 6.8 - 8.8 g/dL    Alkaline Phosphatase 102 40 - 150 U/L    ALT 49 0 - 70 U/L    AST 45 0 - 45 U/L   Lipase   Result Value Ref Range    Lipase 112 73 - 393 U/L   Lactic acid whole blood   Result Value Ref Range    Lactic Acid 1.1 0.7 - 2.0 mmol/L   UA with Microscopic   Result Value Ref Range    Color Urine Colorless     Appearance Urine Clear     Glucose Urine Negative NEG^Negative mg/dL    Bilirubin Urine Negative NEG^Negative    Ketones Urine Negative NEG^Negative mg/dL    Specific Gravity Urine 1.002 (L) 1.003 - 1.035    Blood Urine Small (A) NEG^Negative     pH Urine 6.0 5.0 - 7.0 pH    Protein Albumin Urine Negative NEG^Negative mg/dL    Urobilinogen mg/dL 0.0 0.0 - 2.0 mg/dL    Nitrite Urine Negative NEG^Negative    Leukocyte Esterase Urine Negative NEG^Negative    Source Midstream Urine     WBC Urine <1 0 - 5 /HPF    RBC Urine <1 0 - 2 /HPF     *Note: Due to a large number of results and/or encounters for the requested time period, some results have not been displayed. A complete set of results can be found in Results Review.       Medications   0.9% sodium chloride BOLUS (0 mLs Intravenous Stopped 7/21/19 2111)   metoclopramide (REGLAN) injection 10 mg (10 mg Intravenous Given 7/21/19 1957)        7:39 PM Patient assessed.     Assessments & Plan (with Medical Decision Making)  44-year-old male type 2 diabetes, on Humira for ankylosing spondylitis presents with abdominal discomfort nausea and one episode of profuse diarrhea.  Recent antibiotic treatment for right great toe paronychia/cellulitis, initially treated with doxycycline and cephalexin, switched to clindamycin 150 mg 3 times daily starting 3 days ago.  History of reported C. difficile colitis.  Toe looks good essentially just minimal medial redness, no swelling, no purulent discharge.  Hold clindamycin for now, warm packs to toe.  Await stool studies.  Advance diet as tolerated.  Return criteria reviewed     I have reviewed the nursing notes.    I have reviewed the findings, diagnosis, plan and need for follow up with the patient.          Medication List      There are no discharge medications for this visit.         Final diagnoses:   Diarrhea, unspecified type     This document serves as a record of the services and decisions personally performed and made by Ovi Cavazos MD. It was created on HIS/HER behalf by   Fei Padilla, a trained medical scribe. The creation of this document is based the provider's statements to the medical scribe.  Fei Padilla 7:39 PM 7/21/2019    Provider:   The  information in this document, created by the medical scribe for me, accurately reflects the services I personally performed and the decisions made by me. I have reviewed and approved this document for accuracy prior to leaving the patient care area.  Ovi Cavazos MD 7:39 PM 7/21/2019 7/21/2019   [facility] EMERGENCY DEPARTMENT     Ovi Cavazos MD  07/21/19 [id number]

## 2019-07-23 ENCOUNTER — NURSE TRIAGE (OUTPATIENT)
Dept: NURSING | Facility: CLINIC | Age: 44
End: 2019-07-23

## 2019-07-23 ENCOUNTER — ALLIED HEALTH/NURSE VISIT (OUTPATIENT)
Dept: PHARMACY | Facility: CLINIC | Age: 44
End: 2019-07-23
Payer: COMMERCIAL

## 2019-07-23 DIAGNOSIS — M45.8 ANKYLOSING SPONDYLITIS OF SACRAL REGION (H): ICD-10-CM

## 2019-07-23 DIAGNOSIS — J45.31 MILD PERSISTENT ASTHMA WITH ACUTE EXACERBATION: ICD-10-CM

## 2019-07-23 DIAGNOSIS — L03.031 CELLULITIS OF TOE OF RIGHT FOOT: ICD-10-CM

## 2019-07-23 DIAGNOSIS — H66.90 EAR INFECTION: ICD-10-CM

## 2019-07-23 DIAGNOSIS — R53.83 OTHER FATIGUE: ICD-10-CM

## 2019-07-23 DIAGNOSIS — E78.5 HYPERLIPIDEMIA LDL GOAL <100: ICD-10-CM

## 2019-07-23 DIAGNOSIS — F41.8 DEPRESSION WITH ANXIETY: Primary | ICD-10-CM

## 2019-07-23 DIAGNOSIS — R11.0 NAUSEA: ICD-10-CM

## 2019-07-23 PROCEDURE — 99606 MTMS BY PHARM EST 15 MIN: CPT | Performed by: PHARMACIST

## 2019-07-23 PROCEDURE — 99607 MTMS BY PHARM ADDL 15 MIN: CPT | Performed by: PHARMACIST

## 2019-07-23 NOTE — PATIENT INSTRUCTIONS
Recommendations from today's MTM visit:                                                        Patient to go to lab for updated lipid panel. Order placed previously by PCP.    Next MTM visit: October 8 at 2 pm    To schedule another MTM appointment, please call the clinic directly or you may call the MTM scheduling line at [phone] or toll-free at [phone].     My Clinical Pharmacist's contact information:                                                      It was a pleasure talking with you today!  Please feel free to contact me with any questions or concerns you have.     Radha Mcdonald, Pharm.D, [facility]  Medication Therapy Management Pharmacist      You may receive a survey about the MTM services you received by email and/or US Mail.  I would appreciate your feedback to help me serve you better in the future. Your comments will be anonymous.

## 2019-07-23 NOTE — PROGRESS NOTES
SUBJECTIVE/OBJECTIVE:                           Joel Pineda is a 42 year old male called for a follow visit for Medication Therapy Management.  He was referred to me from Ksenia Lyles.     Chief Complaint: Follow up from 6/11/19.    Allergies/ADRs: Reviewed in Epic  Tobacco: No tobacco use  Alcohol: none  Caffeine: not assessed today  Activity: Not assessed today  PMH: Reviewed in Epic    Asthma: Current asthma medications: ICS/LABA- Advair 1 puff(s) twice daily (patient has been holding this medication for the last week and he has been fine, no increase in asthma symptoms), albuterol prn, singulair 10mg daily. Patient did try to discontinue singulair but had an increase in asthma symptoms so restarted. Patient would like to continue off of Advair for now. He feels he is managed with singulair at this point.  Pt reports the following symptoms: none.  AAP on file: YES  PIF was completed today: No  ACT Total Scores 8/28/2018 1/3/2019 5/28/2019   ACT TOTAL SCORE - - -   ASTHMA ER VISITS - - -   ASTHMA HOSPITALIZATIONS - - -   ACT TOTAL SCORE (Goal Greater than or Equal to 20) 16 22 22   In the past 12 months, how many times did you visit the emergency room for your asthma without being admitted to the hospital? 0 1 1   In the past 12 months, how many times were you hospitalized overnight because of your asthma? 0 0 0     Fatigue: patient continues to experience fatigue. He is napping less than he had been in the past. Patient feels this may be related to not using Humira consistently.    Ankylosing Spondylitis/Pain: Current medications include Humira 40mg every 2 weeks.  Patient has had delays in treatment due to infections and hasn't been able to take the Humira every 2 weeks as prescribed. Usuaslly more like every 3 weeks. Patient would like to be able to take the Humira consistently every 2 weeks to determine its effectiveness. Patient is due to have a lumbar MRI and was referred to pain management.      Ear Infection: Current therapy includes ciprodex 5 drops both ears twice a week and clotrimazole 5 drops both ears twice a week for prevention. This is likely due to use of Humira.    Cellulitis: Current therapy includes clindamycin 150mg three times daily. Patient is experiencing stomach upset and diarrhea. Patient will complete therapy on Thursday. Patient still has pain in the right toe but states it has slowly been improving. Patient would like to continue to finish out his course of antibiotics.    Nausea: current therapy includes metoclopramide 5mg tid prn-patient has been using daily; ondansetron 8mg every 8 hours as needed. Patient takes 1-2 a day. Patient feels the ondansetron doesn't last as long.     Mood/Anxiety: current therapy includes duloxetine 60mg daily, lamictal 200mg daily, Xanax XR 1mg every day. Patient sees his therapist every week. Patient is seeing a new psychiatrist, Dr. Alegria with [facility] in [place] and has been following with him once a month.  Patient goes to DBT group weekly on Tuesdays. Patient feels group has been helpful. Patient is feeling overwhelmed. Patient's goal is to stay out of the hospital. Patient feels safe today.      Hyperlipidemia: Current therapy includes rosuvastatin 40mg once daily, Lovaza 2gm in once daily. Pt reports no significant myalgias or other side effects. Last lipid panel was March and trigs were 583. Patient has not had an updated lipid panel.     ASSESSMENT:                             Current medications were reviewed today.     Medication Adherence: no issues identified    Asthma: Stable. Will need to reassess with holding of Advair.    Fatigue: Needs improvement.  Lapse in therapy for Ankylosing spondylitis could potentially be contributing. Patient to continue to monitor and continue working with psychiatry and his therapist.     Ankylosing Spondylitis: Needs improvement. Patient has had interruptions in therapy due to  recurrent infections. Too early to assess efficacy.    Cellulitis: Stable; recommend patient be seen if symptoms worsen     Nausea: Needs improvement. Likely will improve once antibiotic therapy completed.    Mood/Anxiety: Stable.  Patient in close follow-up with psychiatry.    Hyperlipidemia: Needs improvement. Recommend updated lipid lab since last labs showed trigs >500.    PLAN:                            Patient to go to lab for updated lipid panel. Order placed previously by PCP.    I spent 30 minutes with this patient today. All changes were made via collaborative practice agreement with Ksenia Lyles. A copy of the visit note was provided to the patient's primary care provider.    Will follow up in 10 weeks.    The patient was sent via hoccer a summary of these recommendations as an after visit summary.     Radha Mcdonald, Pharm.D, BCACP  Medication Therapy Management Pharmacist

## 2019-07-24 ENCOUNTER — OFFICE VISIT (OUTPATIENT)
Dept: URGENT CARE | Facility: URGENT CARE | Age: 44
End: 2019-07-24
Payer: MEDICARE

## 2019-07-24 ENCOUNTER — TELEPHONE (OUTPATIENT)
Dept: FAMILY MEDICINE | Facility: CLINIC | Age: 44
End: 2019-07-24

## 2019-07-24 VITALS
WEIGHT: 315 LBS | SYSTOLIC BLOOD PRESSURE: 117 MMHG | RESPIRATION RATE: 16 BRPM | TEMPERATURE: 98 F | BODY MASS INDEX: 38.04 KG/M2 | DIASTOLIC BLOOD PRESSURE: 78 MMHG | OXYGEN SATURATION: 98 % | HEART RATE: 76 BPM

## 2019-07-24 DIAGNOSIS — M45.8 ANKYLOSING SPONDYLITIS OF SACRAL REGION (H): ICD-10-CM

## 2019-07-24 DIAGNOSIS — L03.031 PARONYCHIA OF TOE, RIGHT: Primary | ICD-10-CM

## 2019-07-24 DIAGNOSIS — E11.8 TYPE 2 DIABETES MELLITUS WITH COMPLICATION, WITHOUT LONG-TERM CURRENT USE OF INSULIN (H): ICD-10-CM

## 2019-07-24 PROCEDURE — 99214 OFFICE O/P EST MOD 30 MIN: CPT | Performed by: FAMILY MEDICINE

## 2019-07-24 RX ORDER — CLINDAMYCIN HCL 300 MG
300 CAPSULE ORAL 4 TIMES DAILY
Qty: 28 CAPSULE | Refills: 0 | Status: SHIPPED | OUTPATIENT
Start: 2019-07-24 | End: 2019-08-01

## 2019-07-24 ASSESSMENT — PAIN SCALES - GENERAL: PAINLEVEL: MODERATE PAIN (5)

## 2019-07-24 NOTE — TELEPHONE ENCOUNTER
"\"I am having one of the worst headaches I have had in a long. I have taken Oxycodone, my migraine med and Reglan for nausea. I have tried pressure and ice, nothing is helping. With medication its a 6/10, once it wears off it's much higher.It's been 5-6 hours like this.\" Denies other sx. Triaged and advised ER.  Lucrecia Huerta RN [facility]        Reason for Disposition    [1] SEVERE headache (e.g., excruciating) AND [2] not improved after 2 hours of pain medicine    Additional Information    Negative: Difficult to awaken or acting confused (e.g., disoriented, slurred speech)    Negative: [1] Weakness of the face, arm or leg on one side of the body AND [2] new onset    Negative: [1] Numbness of the face, arm or leg on one side of the body AND [2] new onset    Negative: [1] Loss of speech or garbled speech AND [2] new onset    Negative: Passed out (i.e., lost consciousness, collapsed and was not responding)    Negative: Sounds like a life-threatening emergency to the triager    Negative: Followed a head injury within last 3 days    Negative: Pregnant    Negative: Traumatic Brain Injury (TBI) is suspected    Negative: Unable to walk, or can only walk with assistance (e.g., requires support)    Negative: Stiff neck (can't touch chin to chest)    Negative: Severe pain in one eye    Negative: [1] Other family members (or roommates) with headaches AND [2] possibility of carbon monoxide exposure    Negative: [1] SEVERE headache (e.g., excruciating) AND [2] \"worst headache\" of life    Negative: [1] SEVERE headache AND [2] sudden-onset (i.e., reaching maximum intensity within seconds)    Negative: [1] SEVERE headache AND [2] fever    Negative: Loss of vision or double vision (Exception: same as prior migraines)    Negative: [1] Fever > 100.0 F (37.8 C) AND [2] diabetes mellitus or weak immune system (e.g., HIV positive, cancer chemo, splenectomy, chronic steroids)    Negative: Patient sounds very sick or weak " to the triager    Protocols used: HEADACHE-A-AH

## 2019-07-24 NOTE — TELEPHONE ENCOUNTER
Called the patient and he feel that over all the infection is getting better. He is having more pain now. No streaking. Discoloration is getting better.     The pain is going up the foot now. He is rating the pain a 5 out of 10.     Patient is wondering what to do.    Sapna Barragan RN, [facility]

## 2019-07-24 NOTE — TELEPHONE ENCOUNTER
Reason for call:  Patient reporting a symptom    Symptom or request: Big toe pain     Duration (how long have symptoms been present): 10 days    Have you been treated for this before? Yes    Additional comments: Please call back and advise.    Phone Number patient can be reached at:  Home number on file [phone] (home)    Best Time:  any    Can we leave a detailed message on this number:  YES    Call taken on 7/24/2019 at 12:11 PM by Sienna Porter

## 2019-07-24 NOTE — PROGRESS NOTES
"SUBJECTIVE:  Chief Complaint   Patient presents with     Toe Injury     right big toe--feeling feveris and dizzy--migrainen yesterday--already on 2 antibiotics for the injury but the pain is going up the foot and feel like pain in the bone     Joel Pineda is a 44 year old male who presents complaining of right foot    first digit pain.  He has noted some redness and swelling along the cuticle margin.  Symptoms began about 10 days ago after he was cutting his toenail.  .   Severity: moderate.  He denies any trauma to the area.  No fevers or chills noted.  No migration of redness or swelling proximally.  He went to ER 8 days ago and was diagnosed with paronychia-  Given Rx of keflex and doxycycline.  He developed itchy rash, unclear if he was reacting to one or both antibiotics.  He saw his primary care 5 days ago and was given Rx for Clindamycin 150 mg tid for 7 days.  He has 3 more days remaining of antibiotic, but he still has pain (worse with certain shoes)  Some aching higher in his toe, but not redness  He went to [facility] and they filed his toenails, but no improvement    He is diabetic type 2 with some neuropathy in his toes-  No insulin, taking Metformin    Also has ankylosing spondylitis, using Humira injections every 2 weeks.  He has been holding his injection due to this infection, so he is 3 weeks since his last injection      Past Medical History:   Diagnosis Date     Acne      Allergic state      Anxiety      Bipolar 2 disorder (H)      Chest pain     Chest pain, regulated w/BP meds. Clear arteries.     Chronic pain      Depressive disorder      Diabetes (H)      Diverticulosis      Gastroesophageal reflux disease      Hypertension      IBS (irritable bowel syndrome)      Intracranial arachnoid cyst      Polyneuropathy      Pulmonary embolism (H)      Skin exam, screening for cancer 12/3/2013     Sleep apnea      Uncomplicated asthma      Patient Active Problem List   Diagnosis     Major " depressive disorder, recurrent episode (H)     Intermittent asthma     Hyperlipidemia LDL goal <100     Chronic nonallergic rhinitis     Diverticulosis     GERD (gastroesophageal reflux disease)     Anxiety     LLOYD (obstructive sleep apnea)- mild (AHI 11)     Intracranial arachnoid cyst     Facet arthritis of cervical region     Acquired hypothyroidism     Bipolar 2 disorder (H)     Chronic midline low back pain without sciatica     Irritable bowel syndrome with diarrhea     B12 deficiency     Essential hypertension with goal blood pressure less than 140/90     Chronic pain syndrome     Ankylosing spondylitis of sacral region (H)     Morbid obesity due to hypertriglyceridemia (H)     Fatty infiltration of liver     DDD (degenerative disc disease), lumbar     Type 2 diabetes mellitus with complication, without long-term current use of insulin (H)     Peripheral polyneuropathy     History of pulmonary embolism       ALLERGIES:  Amoxicillin-pot clavulanate; Banana; Nitroglycerin; Penicillins; Provigil [modafinil]; Gadolinium; Dye [contrast dye]; Golimumab; Neurontin [gabapentin]; Nortriptyline; Varicella virus vaccine live; Flagyl [metronidazole hcl]; Latex; Metronidazole; and No clinical screening - see comments      Current Outpatient Medications on File Prior to Visit:  acetaminophen 500 MG CAPS Take 500 mg by mouth every 4 hours as needed   adalimumab (HUMIRA *CF*) 40 MG/0.4ML pen kit Inject 0.4 mLs (40 mg) Subcutaneous every 14 days . Hold for signs of infection, then seek medical attention.   albuterol (PROAIR HFA/PROVENTIL HFA/VENTOLIN HFA) 108 (90 Base) MCG/ACT inhaler Inhale 2 puffs into the lungs every 4 hours as needed for shortness of breath / dyspnea or wheezing   ALPRAZolam (XANAX XR) 1 MG 24 hr tablet Take 1 mg by mouth daily   aspirin (ASA) 81 MG tablet Take 81 mg by mouth daily    blood glucose monitoring (NO BRAND SPECIFIED) meter device kit Use to test blood sugar 2 times daily or as directed.  Include test strips (2 boxes) with 3 refills   celecoxib (CELEBREX) 200 MG capsule TAKE 1 CAPSULE BY MOUTH TWICE DAILY AS NEEDED FOR MODERATE PAIN.   cetirizine (ZYRTEC) 10 MG tablet Take 1 tablet (10 mg) by mouth At Bedtime   cholecalciferol (VITAMIN D3) 47295 units (1250 mcg) capsule Take one capsule every two weeks.   ciprofloxacin-dexamethasone (CIPRODEX) 0.3-0.1 % otic suspension Place 5 drops into the right ear twice a week   clindamycin (CLEOCIN) 150 MG capsule Take 1 capsule (150 mg) by mouth 3 times daily   clotrimazole (LOTRIMIN) 1 % external solution Apply 1 mL topically 2 times daily Correct use is 5 drops in right ear twice weekly   cyanocobalamin (CYANOCOBALAMIN) 1000 MCG/ML injection Inject 1 mL (1,000 mcg) into the muscle every 30 days   DULoxetine (CYMBALTA) 60 MG capsule Take 60 mg by mouth daily   EPINEPHrine (EPIPEN/ADRENACLICK/OR ANY BX GENERIC EQUIV) 0.3 MG/0.3ML injection 2-pack Inject 0.3 mLs (0.3 mg) into the muscle once as needed for anaphylaxis   famotidine (PEPCID) 10 MG tablet Take 10 mg by mouth Prior to administration of Humira every 2 weeks   fluticasone-salmeterol (ADVAIR DISKUS) 250-50 MCG/DOSE inhaler Inhale 1 puff into the lungs 2 times daily   Ginger, Zingiber officinalis, (GINGER ROOT) 550 MG CAPS capsule Take 550 mg by mouth Up to three times daily   hydrOXYzine (ATARAX) 50 MG tablet Take  mg by mouth as needed For anxiety and insomnia   lamoTRIgine (LAMICTAL) 100 MG tablet Take 200 mg by mouth daily   levothyroxine (SYNTHROID/LEVOTHROID) 75 MCG tablet TAKE 1 TABLET EVERY MORNING   lidocaine (XYLOCAINE) 5 % external ointment UAD   loperamide (IMODIUM) 2 MG capsule Take 2 mg by mouth 4 times daily as needed for diarrhea   metFORMIN (GLUCOPHAGE-XR) 500 MG 24 hr tablet Take 2 tablets (1,000 mg) by mouth daily (with dinner)   metoclopramide (REGLAN) 5 MG tablet Take 1 tablet (5 mg) by mouth 3 times daily as needed (nausea)   metoprolol succinate ER (TOPROL-XL) 200 MG 24 hr  tablet TAKE 2 TABLETS (400MG)     DAILY   montelukast (SINGULAIR) 10 MG tablet Take 1 tablet (10 mg) by mouth every evening   omega-3 acid ethyl esters (LOVAZA) 1 g capsule TAKE 2 CAPSULES BY MOUTH TWICE DAILY.   omeprazole 20 MG tablet Take 20 mg by mouth daily    ondansetron (ZOFRAN) 8 MG tablet TAKE 1 TABLET BY MOUTH EVERY 8 HOURS AS NEEDED FOR NAUSEA OR VOMITING   order for [facility] Respironics REMSTAR 60 Series Auto CPAP 9-13 cm H2O, Wisp nasal mask w/a large cushion and a chinstrap   oxyCODONE (ROXICODONE) 5 MG tablet Take 1-2 tablets (5-10 mg) by mouth every 6 hours as needed for pain (maximum 4 tablet(s) per day)   pregabalin (LYRICA) 150 MG capsule Take 1 capsule (150 mg) by mouth 2 times daily   pseudoePHEDrine (SUDAFED) 120 MG 12 hr tablet Take 1 tablet (120 mg) by mouth every 12 hours   ramipril (ALTACE) 10 MG capsule Take 1 capsule (10 mg) by mouth daily   rizatriptan (MAXALT-MLT) 5 MG ODT tab Take 1 tablet (5 mg) by mouth at onset of headache for migraine   rosuvastatin (CRESTOR) 40 MG tablet Take 1 tablet (40 mg) by mouth daily   syringe, disposable, (BD TUBERCULIN SYRINGE) 1 ML MISC Equipment being ordered: 1 ml tuberculin syringes to be used for Vitamin B12 injections.   triamcinolone (KENALOG) 0.1 % external cream Apply topically 2 times daily as needed for irritation   vitamin C (ASCORBIC ACID) 500 MG tablet Take 500 mg by mouth daily     No current facility-administered medications on file prior to visit.     Social History     Tobacco Use     Smoking status: Never Smoker     Smokeless tobacco: Never Used   Substance Use Topics     Alcohol use: Yes     Alcohol/week: 0.0 oz     Drinks per session: 1 or 2     Binge frequency: Weekly     Comment: occ 1/week       Family History   Problem Relation Age of Onset     Musculoskeletal Disorder Mother         back     Anxiety Disorder Mother      Colon Polyps Mother      Ulcerative Colitis Mother         and ischemic small intestine, surgery     Hypertension  "Mother      Breast Cancer Mother      Osteoporosis Mother      Diabetes Mother         Type 2, Diagnosed in 2014     Depression Mother         Takes Cymbalta to help with chronic pain + depx     Thyroid Disease Mother         Hypothyroidism     Obesity Mother         Under much better control latter half of 2015     Musculoskeletal Disorder Father         back     Substance Abuse Father      Hypertension Father      Hyperlipidemia Father      Depression Father         Off meds for many years. Seems \"ok\"     Heart Disease Maternal Grandmother      Heart Disease Maternal Grandfather      Psychotic Disorder Paternal Grandfather      Suicide Paternal Grandfather      Depression Paternal Grandfather         Pediatrician. Committed suicide by pistol in 1990.     Musculoskeletal Disorder Brother         back     Depression Brother         Expressed as anger and moodiness     Substance Abuse Sister      Depression Sister         Mental Health Therapist, yet no anti-depressants?     Anxiety Disorder Sister         Mental Health Therapist, yet no anti-anxiety meds?     Other Cancer Other         Bladder Cancer - Fatal     Substance Abuse Brother      Colon Cancer No family hx of      Crohn's Disease No family hx of      Anesthesia Reaction No family hx of      Cancer No family hx of         No family history of skin cancer       ROS:  CONSTITUTIONAL:NEGATIVE for fever, chills,    EYES: NEGATIVE for vision changes or irritation  ENT/MOUTH: NEGATIVE for ear, mouth and throat problems  RESP:NEGATIVE for significant cough or SOB  GI: NEGATIVE for nausea, abdominal pain,      OBJECTIVE:  /78 (BP Location: Left arm, Patient Position: Sitting, Cuff Size: Adult Large)   Pulse 76   Temp 98  F (36.7  C) (Oral)   Resp 16   Wt 145.5 kg (320 lb 12.8 oz)   SpO2 98%   BMI 38.04 kg/m    right   Foot exam:  examination of first digit reveals paronychia with redness, tenderness     along the distal digit at the nail margin.  No " drainage, appears partially treated, no swelling  His toenail at the margin has a 90 degree turn, so the nail tends to cut into the skin of the toe    GENERAL APPEARANCE:  , alert and mild distress  MS:extremities normal- no gross deformities noted,  FROM noted in all extremities  NEURO: Normal strength and tone, sensory exam grossly normal,  normal speech and mentation  SKIN: no suspicious lesions or rashes      ASSESSMENT:  Paronychia of toe, right     - clindamycin (CLEOCIN) 300 MG capsule; Take 1 capsule (300 mg) by mouth 4 times daily for 7 days-  Will treat longer- he was on a lower dose in the past days     .   patient was advised to perform warm water soaks twice daily.       The importance of wide shoes was stressed, since a tight shoe tends to push the toenail into the skin promoting infection.    Suggested followup with podiatry-  Suggested he have destruction of the margin of his nail, since the 90 degree turn in his nail causes repeated trauma to the skin of his toe-  He will discuss with primary care.  No referral requested       Type 2 diabetes mellitus with complication, without long-term current use of insulin (H)   discussed about his increased risk of infection not responding well due to his diabetes  Will need to continue close monitoring      Ankylosing spondylitis of sacral region (H)  Recommend he continue to hold his Humira-  The immunosuppression would impair the healing of his toe

## 2019-07-24 NOTE — TELEPHONE ENCOUNTER
Patient updated on the below, no questions at this time, verbalized understanding.    Diane Tuttle RN

## 2019-07-25 ENCOUNTER — MYC REFILL (OUTPATIENT)
Dept: FAMILY MEDICINE | Facility: CLINIC | Age: 44
End: 2019-07-25

## 2019-07-25 DIAGNOSIS — R11.0 NAUSEA: ICD-10-CM

## 2019-07-25 RX ORDER — METOCLOPRAMIDE 5 MG/1
5 TABLET ORAL 3 TIMES DAILY PRN
Qty: 20 TABLET | Refills: 3 | Status: SHIPPED | OUTPATIENT
Start: 2019-07-25 | End: 2020-01-28

## 2019-07-25 NOTE — TELEPHONE ENCOUNTER
"Requested Prescriptions   Pending Prescriptions Disp Refills     metoclopramide (REGLAN) 5 MG tablet 20 tablet 0     Sig: Take 1 tablet (5 mg) by mouth 3 times daily as needed (nausea)        Antivertigo/Antiemetic Agents Passed - 7/25/2019 11:50 AM        Passed - Recent (12 mo) or future (30 days) visit within the authorizing provider's specialty     Patient had office visit in the last 12 months or has a visit in the next 30 days with authorizing provider or within the authorizing provider's specialty.  See \"Patient Info\" tab in inbasket, or \"Choose Columns\" in Meds & Orders section of the refill encounter.              Passed - Medication is active on med list        Passed - Patient is 18 years of age or older        metoclopramide (REGLAN) 5 MG tablet  Last Written Prescription Date:  7/1/19  Last Fill Quantity: 20,  # refills: 0   Last office visit: 7/19/2019 with prescribing provider:  Jing Priest   Future Office Visit:   Next 5 appointments (look out 90 days)    Oct 02, 2019  1:20 PM CDT  Return Visit with Oneil Baxter MD  [facility] [place] ([facility]) [address]  [place] [address] [phone]           "

## 2019-07-25 NOTE — TELEPHONE ENCOUNTER
Prescription approved per [facility] Refill Protocol.    Dianne Henderson RN

## 2019-07-26 ENCOUNTER — MYC REFILL (OUTPATIENT)
Dept: FAMILY MEDICINE | Facility: CLINIC | Age: 44
End: 2019-07-26

## 2019-07-26 DIAGNOSIS — Z91.018 FOOD ALLERGY: ICD-10-CM

## 2019-07-26 RX ORDER — EPINEPHRINE 0.3 MG/.3ML
0.3 INJECTION SUBCUTANEOUS
Qty: 0.6 ML | Refills: 3 | Status: SHIPPED | OUTPATIENT
Start: 2019-07-26 | End: 2021-03-23

## 2019-07-26 NOTE — TELEPHONE ENCOUNTER
"Requested Prescriptions   Pending Prescriptions Disp Refills     EPINEPHrine (EPIPEN/ADRENACLICK/OR ANY BX GENERIC EQUIV) 0.3 MG/0.3ML injection 2-pack  Last Written Prescription Date:  8/06/18  Last Fill Quantity: 0.6mL,  # refills: 3   Last office visit: 7/19/2019 with prescribing provider:  Dr. Lyles   Future Office Visit:   Next 5 appointments (look out 90 days)    Oct 02, 2019  1:20 PM CDT  [facility] [address] [address]  [phone]          0.6 mL 3     Sig: Inject 0.3 mLs (0.3 mg) into the muscle once as needed for anaphylaxis       Anaphylaxis Kits Protocol Passed - 7/26/2019 11:00 AM        Passed - Recent (12 mo) or future (30 days) visit witin the authorizing provider's specialty     Patient had office visit in the last 12 months or has a visit in the next 30 days with authorizing provider or within the authorizing provider's specialty.  See \"Patient Info\" tab in inbasket, or \"Choose Columns\" in Meds & Orders section of the refill encounter.              Passed - Patient is age 5 or older        Passed - Medication is active on med list          "

## 2019-07-26 NOTE — TELEPHONE ENCOUNTER
Prescription approved per [facility] Refill Protocol.  Kristin West RN

## 2019-07-29 DIAGNOSIS — M54.16 LUMBAR RADICULOPATHY: Primary | ICD-10-CM

## 2019-07-31 ENCOUNTER — MYC REFILL (OUTPATIENT)
Dept: FAMILY MEDICINE | Facility: CLINIC | Age: 44
End: 2019-07-31

## 2019-07-31 DIAGNOSIS — M45.8 ANKYLOSING SPONDYLITIS OF SACRAL REGION (H): ICD-10-CM

## 2019-07-31 DIAGNOSIS — M51.369 DDD (DEGENERATIVE DISC DISEASE), LUMBAR: ICD-10-CM

## 2019-07-31 RX ORDER — OXYCODONE HYDROCHLORIDE 5 MG/1
5-10 TABLET ORAL EVERY 6 HOURS PRN
Qty: 35 TABLET | Refills: 0 | Status: SHIPPED | OUTPATIENT
Start: 2019-07-31 | End: 2019-08-20

## 2019-07-31 NOTE — TELEPHONE ENCOUNTER
Requested Prescriptions   Pending Prescriptions Disp Refills     oxyCODONE (ROXICODONE) 5 MG tablet 35 tablet 0     Sig: Take 1-2 tablets (5-10 mg) by mouth every 6 hours as needed for pain (maximum 4 tablet(s) per day)       There is no refill protocol information for this order          oxyCODONE (ROXICODONE) 5 MG tablet      Last Written Prescription Date:  7/16/19  Last Fill Quantity: 35,   # refills: 0  Last Office Visit: 7/19/19  Future Office visit:    Next 5 appointments (look out 90 days)    Aug 01, 2019  4:40 PM CDT  [facility] Visit with Faith Camacho PA-C  [facility] [address] [place] [address]  [phone]   Oct 02, 2019  1:20 PM CDT  Return Visit with Oneil Baxter MD  [facility] ([facility]) [address] [place], [place] [address] [address]  [phone]           Routing refill request to provider for review/approval because:  Drug not on the [facility], [facility] or [facility] refill protocol or controlled substance

## 2019-07-31 NOTE — TELEPHONE ENCOUNTER
Controlled Substance Refill Request for Roxicodone  Problem List Complete:  Yes  Chronic pain syndrome (Chronic)   Problem Detail     Noted:  4/4/2017   Priority:  Medium   Overview Addendum 7/1/2019 12:36 PM by Yuliana Solis RN   Patient is followed by Ksenia Lyles MD for ongoing prescription of pain medication.  All refills should only be approved by this provider, or covering partner.     Medication(s): oxy 5.   Maximum quantity per month: 40  Clinic visit frequency required: Q3  months      Controlled substance agreement: 6/12/19  Encounter-Level CSA - 04/04/2017:            Controlled Substance Agreement - Scan on 4/11/2017  1:30 PM : CONTROLLED SUBSTANCE AGREEMENT (below)                   Pain Clinic evaluation in the past: Yes     DIRE Total Score(s):  No flowsheet data found.     Last [facility] website verification:  02/25/19, 7/1/19   [email or web address]    Previous Version      checked in past 3 months?  Yes 7/1/19     Last Written Prescription Date:  7/16/19  Last Fill Quantity: 35,  # refills: 0   Last office visit: 7/19/2019 with prescribing provider:  Trupti   Future Office Visit:   Next 5 appointments (look out 90 days)    Aug 01, 2019  4:40 PM CDT  [facility] [address] [place] [id number]  [phone]   Oct 02, 2019  1:20 PM CDT  Return Visit with Oneil Baxter MD  [facility] ([address] [address]  [phone]         RX monitoring program ([place]) reviewed:  not reviewed/not due - last done on 7/1/19    [place] profile:  [email or web address]    ISHA Park, RN, PHN

## 2019-08-01 ENCOUNTER — OFFICE VISIT (OUTPATIENT)
Dept: FAMILY MEDICINE | Facility: CLINIC | Age: 44
End: 2019-08-01
Payer: MEDICARE

## 2019-08-01 VITALS
TEMPERATURE: 97.6 F | OXYGEN SATURATION: 98 % | HEART RATE: 69 BPM | DIASTOLIC BLOOD PRESSURE: 74 MMHG | SYSTOLIC BLOOD PRESSURE: 112 MMHG | HEIGHT: 77 IN | WEIGHT: 315 LBS | BODY MASS INDEX: 37.19 KG/M2

## 2019-08-01 DIAGNOSIS — K13.0 ANGULAR CHEILITIS: ICD-10-CM

## 2019-08-01 DIAGNOSIS — B37.2 YEAST INFECTION OF THE SKIN: Primary | ICD-10-CM

## 2019-08-01 PROCEDURE — 99214 OFFICE O/P EST MOD 30 MIN: CPT | Performed by: FAMILY MEDICINE

## 2019-08-01 RX ORDER — CLOTRIMAZOLE AND BETAMETHASONE DIPROPIONATE 10; .64 MG/G; MG/G
CREAM TOPICAL 2 TIMES DAILY
Qty: 45 G | Refills: 0 | Status: SHIPPED | OUTPATIENT
Start: 2019-08-01 | End: 2019-09-23

## 2019-08-01 RX ORDER — OXYCODONE HYDROCHLORIDE 5 MG/1
5-10 TABLET ORAL EVERY 6 HOURS PRN
Qty: 35 TABLET | Refills: 0 | Status: CANCELLED | OUTPATIENT
Start: 2019-08-01

## 2019-08-01 ASSESSMENT — MIFFLIN-ST. JEOR: SCORE: 2456.6

## 2019-08-01 ASSESSMENT — PAIN SCALES - GENERAL: PAINLEVEL: NO PAIN (0)

## 2019-08-01 NOTE — PATIENT INSTRUCTIONS
Apply lotrisone to groin area twice a day for 5-7 days until clear.        For the area by mouth you can use the triamcinolone topically to the area.

## 2019-08-01 NOTE — PROGRESS NOTES
"Subjective     Joel Pineda is a 44 year old male who presents to clinic today for the following health issues:      HPI       Concern - Yeast infection on genital area  Onset:  noticed yesterday     Description: tip and foreskin of the genital area is painful with rash, some itchy and burning, and pain on glans under skin of shaft with swelling of skin last night, no pain or burning when urinating.      Completed antibiotic Tuesday.  Discomfort on Wed with bathing - discomfort noted tip penis - used ketoconazole - a few hours later swollen, painful, inflamed.  Used benadryl, pepcid and pain med - swelling better but rash worse.        Therapies Tried and outcome: just washing and cleansing, gold bond           BP Readings from Last 3 Encounters:   08/01/19 112/74   07/24/19 117/78   07/21/19 125/77    Wt Readings from Last 3 Encounters:   08/01/19 145.9 kg (321 lb 11.2 oz)   07/24/19 145.5 kg (320 lb 12.8 oz)   07/21/19 147 kg (324 lb)                      Reviewed and updated as needed this visit by Provider  Tobacco  Allergies  Med Hx  Surg Hx  Fam Hx  Soc Hx        Review of Systems   ROS COMP: Constitutional, HEENT, cardiovascular, pulmonary, gi and gu systems are negative, except as otherwise noted.      Objective    /74 (BP Location: Right arm, Patient Position: Chair, Cuff Size: Adult Large)   Pulse 69   Temp 97.6  F (36.4  C) (Tympanic)   Ht 1.956 m (6' 5\")   Wt 145.9 kg (321 lb 11.2 oz)   SpO2 98%   BMI 38.15 kg/m    Body mass index is 38.15 kg/m .  Physical Exam   GENERAL: alert, no distress and obese  HEENT:  TM's clear, throat clear, cracking of the skin with mild crusting at the angles of the mouth bilaterally - L>R.    NECK: no adenopathy, no asymmetry, masses, or scars and thyroid normal to palpation  RESP: lungs clear to auscultation - no rales, rhonchi or wheezes  CV: regular rate and rhythm, normal S1 S2, no S3 or S4, no murmur, click or rub, no peripheral edema and peripheral " "pulses strong  ABDOMEN: soft, nontender, no hepatosplenomegaly, no masses and bowel sounds normal   (male)/SKIN: testicles normal without atrophy or masses and erythema along the inferior and posterior surface of the scrotum   Glans with erythema and abrasion appearance.  No ulcerations.  Mild erythema without skin breakdown on the skin of the shaft. Satellite lesions in the creases of the groin.      Diagnostic Test Results:  Labs reviewed in Epic        Assessment & Plan     1. Yeast infection of the skin  Avoid use of the ketoconazole at this time.  Topical lotrisone for the antifungal and steroid combo effects.    - clotrimazole-betamethasone (LOTRISONE) 1-0.05 % external cream; Apply topically 2 times daily  Dispense: 45 g; Refill: 0    2. Angular cheilitis  Topical steroid to the perioral area. No secondary infection noted currently.        BMI:   Estimated body mass index is 38.15 kg/m  as calculated from the following:    Height as of this encounter: 1.956 m (6' 5\").    Weight as of this encounter: 145.9 kg (321 lb 11.2 oz).   Weight management plan: Patient was referred to their PCP to discuss a diet and exercise plan.        Patient Instructions   Apply lotrisone to groin area twice a day for 5-7 days until clear.        For the area by mouth you can use the triamcinolone topically to the area.        Return in about 1 week (around 8/8/2019) for as needed for persistent symptoms.    Felipa Renteria MD  [facility]      "

## 2019-08-08 ENCOUNTER — HOSPITAL ENCOUNTER (EMERGENCY)
Facility: CLINIC | Age: 44
Discharge: HOME | End: 2019-08-08
Attending: EMERGENCY MEDICINE | Admitting: EMERGENCY MEDICINE
Payer: MEDICARE

## 2019-08-08 ENCOUNTER — TELEPHONE (OUTPATIENT)
Dept: FAMILY MEDICINE | Facility: CLINIC | Age: 44
End: 2019-08-08

## 2019-08-08 VITALS
WEIGHT: 315 LBS | SYSTOLIC BLOOD PRESSURE: 120 MMHG | OXYGEN SATURATION: 96 % | HEART RATE: 62 BPM | DIASTOLIC BLOOD PRESSURE: 85 MMHG | HEIGHT: 77 IN | TEMPERATURE: 99.1 F | BODY MASS INDEX: 37.19 KG/M2 | RESPIRATION RATE: 18 BRPM

## 2019-08-08 DIAGNOSIS — Z79.899 ENCOUNTER FOR LONG-TERM (CURRENT) USE OF OTHER MEDICATIONS: ICD-10-CM

## 2019-08-08 DIAGNOSIS — R51.9 NONINTRACTABLE HEADACHE, UNSPECIFIED CHRONICITY PATTERN, UNSPECIFIED HEADACHE TYPE: ICD-10-CM

## 2019-08-08 DIAGNOSIS — F31.9 BIPOLAR DISORDER, UNSPECIFIED (H): ICD-10-CM

## 2019-08-08 LAB
AMPHETAMINES UR QL SCN: NEGATIVE
ANION GAP SERPL CALCULATED.3IONS-SCNC: 10 MMOL/L (ref 3–14)
BARBITURATES UR QL: NEGATIVE
BASOPHILS # BLD AUTO: 0.1 10E9/L (ref 0–0.2)
BASOPHILS NFR BLD AUTO: 0.9 %
BENZODIAZ UR QL: NEGATIVE
BUN SERPL-MCNC: 13 MG/DL (ref 7–30)
CALCIUM SERPL-MCNC: 9.8 MG/DL (ref 8.5–10.1)
CANNABINOIDS UR QL SCN: NEGATIVE
CHLORIDE SERPL-SCNC: 106 MMOL/L (ref 94–109)
CO2 SERPL-SCNC: 22 MMOL/L (ref 20–32)
COCAINE UR QL: NEGATIVE
CREAT SERPL-MCNC: 0.88 MG/DL (ref 0.66–1.25)
DIFFERENTIAL METHOD BLD: ABNORMAL
EOSINOPHIL # BLD AUTO: 0.2 10E9/L (ref 0–0.7)
EOSINOPHIL NFR BLD AUTO: 1.3 %
ERYTHROCYTE [DISTWIDTH] IN BLOOD BY AUTOMATED COUNT: 13.2 % (ref 10–15)
ETHANOL UR QL SCN: NEGATIVE
GFR SERPL CREATININE-BSD FRML MDRD: >90 ML/MIN/{1.73_M2}
GLUCOSE SERPL-MCNC: 95 MG/DL (ref 70–99)
HCT VFR BLD AUTO: 50 % (ref 40–53)
HGB BLD-MCNC: 16.3 G/DL (ref 13.3–17.7)
IMM GRANULOCYTES # BLD: 0.1 10E9/L (ref 0–0.4)
IMM GRANULOCYTES NFR BLD: 0.4 %
LYMPHOCYTES # BLD AUTO: 4.7 10E9/L (ref 0.8–5.3)
LYMPHOCYTES NFR BLD AUTO: 34.8 %
MCH RBC QN AUTO: 28.5 PG (ref 26.5–33)
MCHC RBC AUTO-ENTMCNC: 32.6 G/DL (ref 31.5–36.5)
MCV RBC AUTO: 87 FL (ref 78–100)
MONOCYTES # BLD AUTO: 1.2 10E9/L (ref 0–1.3)
MONOCYTES NFR BLD AUTO: 8.6 %
NEUTROPHILS # BLD AUTO: 7.3 10E9/L (ref 1.6–8.3)
NEUTROPHILS NFR BLD AUTO: 54 %
NRBC # BLD AUTO: 0 10*3/UL
NRBC BLD AUTO-RTO: 0 /100
OPIATES UR QL SCN: NEGATIVE
PLATELET # BLD AUTO: 316 10E9/L (ref 150–450)
POTASSIUM SERPL-SCNC: 4.5 MMOL/L (ref 3.4–5.3)
RBC # BLD AUTO: 5.72 10E12/L (ref 4.4–5.9)
SODIUM SERPL-SCNC: 138 MMOL/L (ref 133–144)
WBC # BLD AUTO: 13.6 10E9/L (ref 4–11)

## 2019-08-08 PROCEDURE — 25000128 H RX IP 250 OP 636: Performed by: EMERGENCY MEDICINE

## 2019-08-08 PROCEDURE — 96374 THER/PROPH/DIAG INJ IV PUSH: CPT

## 2019-08-08 PROCEDURE — 80320 DRUG SCREEN QUANTALCOHOLS: CPT | Performed by: EMERGENCY MEDICINE

## 2019-08-08 PROCEDURE — 80307 DRUG TEST PRSMV CHEM ANLYZR: CPT | Performed by: EMERGENCY MEDICINE

## 2019-08-08 PROCEDURE — 85025 COMPLETE CBC W/AUTO DIFF WBC: CPT | Performed by: EMERGENCY MEDICINE

## 2019-08-08 PROCEDURE — 99285 EMERGENCY DEPT VISIT HI MDM: CPT | Mod: 25

## 2019-08-08 PROCEDURE — 96375 TX/PRO/DX INJ NEW DRUG ADDON: CPT

## 2019-08-08 PROCEDURE — 90791 PSYCH DIAGNOSTIC EVALUATION: CPT

## 2019-08-08 PROCEDURE — 25000132 ZZH RX MED GY IP 250 OP 250 PS 637: Mod: GY | Performed by: EMERGENCY MEDICINE

## 2019-08-08 PROCEDURE — 99285 EMERGENCY DEPT VISIT HI MDM: CPT | Mod: Z6 | Performed by: EMERGENCY MEDICINE

## 2019-08-08 PROCEDURE — 80048 BASIC METABOLIC PNL TOTAL CA: CPT | Performed by: EMERGENCY MEDICINE

## 2019-08-08 RX ORDER — OLANZAPINE 5 MG/1
5 TABLET, ORALLY DISINTEGRATING ORAL ONCE
Status: COMPLETED | OUTPATIENT
Start: 2019-08-08 | End: 2019-08-08

## 2019-08-08 RX ORDER — KETOROLAC TROMETHAMINE 15 MG/ML
15 INJECTION, SOLUTION INTRAMUSCULAR; INTRAVENOUS ONCE
Status: COMPLETED | OUTPATIENT
Start: 2019-08-08 | End: 2019-08-08

## 2019-08-08 RX ORDER — LORAZEPAM 2 MG/ML
0.5 INJECTION INTRAMUSCULAR ONCE
Status: COMPLETED | OUTPATIENT
Start: 2019-08-08 | End: 2019-08-08

## 2019-08-08 RX ADMIN — KETOROLAC TROMETHAMINE 15 MG: 15 INJECTION, SOLUTION INTRAMUSCULAR; INTRAVENOUS at 17:20

## 2019-08-08 RX ADMIN — OLANZAPINE 5 MG: 5 TABLET, ORALLY DISINTEGRATING ORAL at 17:21

## 2019-08-08 RX ADMIN — LORAZEPAM 0.5 MG: 2 INJECTION INTRAMUSCULAR; INTRAVENOUS at 17:17

## 2019-08-08 ASSESSMENT — MIFFLIN-ST. JEOR: SCORE: 2448.89

## 2019-08-08 ASSESSMENT — ENCOUNTER SYMPTOMS
NAUSEA: 1
FATIGUE: 1
HEADACHES: 1
FEVER: 0
HALLUCINATIONS: 1
WEAKNESS: 0
DIARRHEA: 1

## 2019-08-08 NOTE — ED TRIAGE NOTES
"Pt arrived in triage with concerns of headache, \"seeing colors\", dizziness, feeling \"aggressive\" for the past week. Pt was at his therapist office today and says his therapist thought he was \"hypomanic\". Pt has been having some neuro psych testing done recently.  "

## 2019-08-08 NOTE — ED AVS SNAPSHOT
[facility], [place], Emergency Department  [address] [place] [id number]  Phone:  [phone]                                    Joel Pineda   MRN: [id number]    Department:  [facility], Emergency Department   Date of Visit:  8/8/2019           After Visit Summary Signature Page    I have received my discharge instructions, and my questions have been answered. I have discussed any challenges I see with this plan with the nurse or doctor.    ..........................................................................................................................................  Patient/Patient Representative Signature      ..........................................................................................................................................  Patient Representative Print Name and Relationship to Patient    ..................................................               ................................................  Date                                   Time    ..........................................................................................................................................  Reviewed by Signature/Title    ...................................................              ..............................................  Date                                               Time          22EPIC Rev 08/18

## 2019-08-08 NOTE — TELEPHONE ENCOUNTER
Reason for call:  Patient reporting a symptom    Symptom or request: Symptoms    Duration (how long have symptoms been present): on going    Have you been treated for this before? Yes    Additional comments: Pt calling to report medication side effects of headaches, hypomania, and seeing colors and was transferred to Triage.    Phone Number patient can be reached at:  Home number on file [phone] (home)    Best Time:  anytime    Can we leave a detailed message on this number:  YES    Call taken on 8/8/2019 at 3:11 PM by Ray Flores

## 2019-08-08 NOTE — ED NOTES
"ED Triage Provider Note  [facility]  Encounter Date: Aug 8, 2019    History:  Chief Complaint   Patient presents with     Headache     Joel Pineda is a 44 year old male who presents to the ED with multiple complaints physical vs MH.  Referred by Psychotherapist for evaluation. Did not want to go to [place]. Patient thinks he is \"hypomanic\"    Review of Systems:  Headache  Anxiety  Visual halucinations    Exam:  BP (!) 163/108   Pulse 74   Temp 99.1  F (37.3  C) (Oral)   Resp 18   Ht 1.956 m (6' 5\")   Wt 145.2 kg (320 lb)   SpO2 97%   BMI 37.95 kg/m    General: Very anxious. Appears stated age.   Cardio: Regular rate, extremities well perfused  Resp: Normal work of breathing, grossly normal respiratory rate  Neuro: Alert. CN II-XII grossly intact. Grossly intact strength.       Medical Decision Making:  Patient arriving to the ED with problem as above. A medical screening exam was performed. Routine labs  initiated from Triage. The patient is appropriate to be immediately roomed.      Karri Barnett MD on 8/8/2019 at 4:53 PM       Karri Barnett MD  08/08/19 [id number]    "

## 2019-08-08 NOTE — TELEPHONE ENCOUNTER
Joel Pnieda is a 44 year old male who calls with cerebral symptoms.    NURSING ASSESSMENT:  Description:  Patient is currently with his psychotherapist at [facility]. And stating he is having symptoms and wondering if they are related to recent prescription of clotrimazole-betamethasone (LOTRISONE) 1-0.05 % external cream.  Onset/duration:  Today  Precip. factors:  Patient has history of symptoms.  Associated symptoms:  Seeing colors, dizzy, dark urine, headaches, and mental stress.  Improves/worsens symptoms:  Nothing  Pain scale (0-10)   0/10  LMP/preg/breast feeding:  N/A  Last exam/Treatment:  8/1/19  Allergies:   Allergies   Allergen Reactions     Amoxicillin-Pot Clavulanate Difficulty breathing     Banana Shortness Of Breath     Pt reports organic Banana is okay.      Nitroglycerin Palpitations     Penicillins Anaphylaxis     Provigil [Modafinil] Shortness Of Breath     headache     Gadolinium Hives and Itching     Patient was premedicated for the contrast allergy. He did still have a reaction a few hours after injection. Hives and itching. Dr. Gomez told tech to inform pt he should only have contrast again in the future when premedicated and at a hospital. Not at an outpatient facility.      Dye [Contrast Dye] Other (See Comments) and Hives     Moderate flushing, CT contrast     Golimumab      Hives, bradycardia, face swelling     Neurontin [Gabapentin] Hives     Moderate hives     Nortriptyline Hives     Varicella Virus Vaccine Live      Rash     Flagyl [Metronidazole Hcl] Palpitations and Hives     Latex Rash     Metronidazole Palpitations, Other (See Comments) and Rash     dizziness (versus ciprofloxacin taken at same time)     No Clinical Screening - See Comments Rash     Nitrile gloves       MEDICATIONS:   Taking medication(s) as prescribed? Yes  Taking over the counter medication(s?) Yes  Any medication side effects? Symptoms listed above - patient wondering if new Rx is causing these  symptoms.    Any barriers to taking medication(s) as prescribed?  No  Medication(s) improving/managing symptoms?  No  Medication reconciliation completed: Yes      NURSING PLAN: Routed to provider Yes    RECOMMENDED DISPOSITION:  To ED, another person to drive - Patient agreeable.  Will comply with recommendation: Yes  If further questions/concerns or if symptoms do not improve, worsen or new symptoms develop, call your PCP or [facility] as soon as possible.      Guideline used:  Telephone Triage Protocols for Nurses, Fifth Edition, JAVON CoulterN, RN, PHN

## 2019-08-08 NOTE — ED PROVIDER NOTES
[facility] ([facility])  August 8, 2019    History     Chief Complaint   Patient presents with     Mental Health Problem     HPI  Joel Pineda is a 44 year old male with a history of bipolar II disorder, type II diabetes mellitus, HTN, PE, intracranial arachnoid cyst, anxiety and depression who presents to the ED for evaluation of a headache associated with seeing colors for the past 10 days. Patient reports he has had migraines before, but he has never seen colors with the migraines. He complains he sleeps for 8 hours, but he wakes up exhausted. Patient reports he is nauseous and has diarrhea as well. He notes he saw his psychotherapist today, and his speech was rushed, and was thought to be hypomanic, so he was sent here for evaluation. Patient denies fever, extremity weakness, or auditory hallucinations. He denies drug or alcohol use. Patient also denies suicidal or homicidal ideations. Of note, patient has not had his Humira for a month, but he was sent supplies, so he will be starting it again tomorrow.    PAST MEDICAL HISTORY  Past Medical History:   Diagnosis Date     Acne      Allergic state      Anxiety      Bipolar 2 disorder (H)      Chest pain     Chest pain, regulated w/BP meds. Clear arteries.     Chronic pain      Depressive disorder      Diabetes (H)      Diverticulosis      Gastroesophageal reflux disease      Hypertension      IBS (irritable bowel syndrome)      Intracranial arachnoid cyst      Polyneuropathy      Pulmonary embolism (H)      Skin exam, screening for cancer 12/3/2013     Sleep apnea      Uncomplicated asthma      PAST SURGICAL HISTORY  Past Surgical History:   Procedure Laterality Date     BACK SURGERY  10/07    lumbar discectomy L5-S1     COLONOSCOPY      Note: colonoscopy scheduled with [facility] on Friday, 9/4/15     COSMETIC SURGERY  2012    Nose Exterior - functional     GI SURGERY  August 2013    Sigmoidectomy     HERNIA REPAIR, UMBILICAL  8/23/11  "   small, Dr. Evan Beavers     INCISION AND DRAINAGE, ABSCESS, COMPLEX  8/23/11    umbilical, Dr. Evan Beavers     LAPAROSCOPIC ASSISTED COLECTOMY LEFT (DESCENDING)  8/15/2013    Procedure: LAPAROSCOPIC ASSISTED COLECTOMY LEFT (DESCENDING);  Laparoscopic Hand Assisted Sigmoid Resection, Mobilization of Splenic Fissure, coloproctoscopy, *Latex Free Room* Anesthesia General with Pain block  ;  Surgeon: Aurora Justice MD;  Location: [facility] OR     NERVE SURGERY  8/18/11    RF ablation @ L3-S1 @ [facility]     RECONSTRUCT NOSE AND SEPTUM (FUNCTIONAL)  10/14/2011    Procedure:RECONSTRUCT NOSE AND SEPTUM (FUNCTIONAL); Functional Septorhinoplasty, Turbinate Reduction, ; Surgeon:CEDRIC CUEVAS; Location:[facility] OR     SINUS SURGERY  10/1/01    ethmoidectomy chronic sinusitis     FAMILY HISTORY  Family History   Problem Relation Age of Onset     Musculoskeletal Disorder Mother         back     Anxiety Disorder Mother      Colon Polyps Mother      Ulcerative Colitis Mother         and ischemic small intestine, surgery     Hypertension Mother      Breast Cancer Mother      Osteoporosis Mother      Diabetes Mother         Type 2, Diagnosed in 2014     Depression Mother         Takes Cymbalta to help with chronic pain + depx     Thyroid Disease Mother         Hypothyroidism     Obesity Mother         Under much better control latter half of 2015     Musculoskeletal Disorder Father         back     Substance Abuse Father      Hypertension Father      Hyperlipidemia Father      Depression Father         Off meds for many years. Seems \"ok\"     Heart Disease Maternal Grandmother      Heart Disease Maternal Grandfather      Psychotic Disorder Paternal Grandfather      Suicide Paternal Grandfather      Depression Paternal Grandfather         Pediatrician. Committed suicide by pistol in 1990.     Musculoskeletal Disorder Brother         back     Depression Brother         Expressed as anger and moodiness     Substance Abuse Sister      " Depression Sister         Mental Health Therapist, yet no anti-depressants?     Anxiety Disorder Sister         Mental Health Therapist, yet no anti-anxiety meds?     Other Cancer Other         Bladder Cancer - Fatal     Substance Abuse Brother      Colon Cancer No family hx of      Crohn's Disease No family hx of      Anesthesia Reaction No family hx of      Cancer No family hx of         No family history of skin cancer     SOCIAL HISTORY  Social History     Tobacco Use     Smoking status: Never Smoker     Smokeless tobacco: Never Used   Substance Use Topics     Alcohol use: Yes     Alcohol/week: 0.0 oz     Drinks per session: 1 or 2     Binge frequency: Weekly     Comment: occ 1/week     MEDICATIONS  No current facility-administered medications for this encounter.      Current Outpatient Medications   Medication     acetaminophen 500 MG CAPS     adalimumab (HUMIRA *CF*) 40 MG/0.4ML pen kit     albuterol (PROAIR HFA/PROVENTIL HFA/VENTOLIN HFA) 108 (90 Base) MCG/ACT inhaler     ALPRAZolam (XANAX XR) 1 MG 24 hr tablet     aspirin (ASA) 81 MG tablet     blood glucose monitoring (NO BRAND SPECIFIED) meter device kit     celecoxib (CELEBREX) 200 MG capsule     cetirizine (ZYRTEC) 10 MG tablet     cholecalciferol (VITAMIN D3) 63651 units (1250 mcg) capsule     ciprofloxacin-dexamethasone (CIPRODEX) 0.3-0.1 % otic suspension     clindamycin (CLEOCIN) 150 MG capsule     clotrimazole (LOTRIMIN) 1 % external solution     clotrimazole-betamethasone (LOTRISONE) 1-0.05 % external cream     cyanocobalamin (CYANOCOBALAMIN) 1000 MCG/ML injection     DULoxetine (CYMBALTA) 60 MG capsule     EPINEPHrine (EPIPEN/ADRENACLICK/OR ANY BX GENERIC EQUIV) 0.3 MG/0.3ML injection 2-pack     famotidine (PEPCID) 10 MG tablet     fluticasone-salmeterol (ADVAIR DISKUS) 250-50 MCG/DOSE inhaler     Libia, Zingiber officinalis, (LIBIA ROOT) 550 MG CAPS capsule     hydrOXYzine (ATARAX) 50 MG tablet     lamoTRIgine (LAMICTAL) 100 MG tablet      levothyroxine (SYNTHROID/LEVOTHROID) 75 MCG tablet     lidocaine (XYLOCAINE) 5 % external ointment     loperamide (IMODIUM) 2 MG capsule     metFORMIN (GLUCOPHAGE-XR) 500 MG 24 hr tablet     metoclopramide (REGLAN) 5 MG tablet     metoprolol succinate ER (TOPROL-XL) 200 MG 24 hr tablet     montelukast (SINGULAIR) 10 MG tablet     omega-3 acid ethyl esters (LOVAZA) 1 g capsule     omeprazole 20 MG tablet     ondansetron (ZOFRAN) 8 MG tablet     order for DME     oxyCODONE (ROXICODONE) 5 MG tablet     pregabalin (LYRICA) 150 MG capsule     pseudoePHEDrine (SUDAFED) 120 MG 12 hr tablet     ramipril (ALTACE) 10 MG capsule     rizatriptan (MAXALT-MLT) 5 MG ODT tab     rosuvastatin (CRESTOR) 40 MG tablet     syringe, disposable, (BD TUBERCULIN SYRINGE) 1 ML MISC     triamcinolone (KENALOG) 0.1 % external cream     vitamin C (ASCORBIC ACID) 500 MG tablet     ALLERGIES  Allergies   Allergen Reactions     Amoxicillin-Pot Clavulanate Difficulty breathing     Banana Shortness Of Breath     Pt reports organic Banana is okay.      Nitroglycerin Palpitations     Penicillins Anaphylaxis     Provigil [Modafinil] Shortness Of Breath     headache     Gadolinium Hives and Itching     Patient was premedicated for the contrast allergy. He did still have a reaction a few hours after injection. Hives and itching. Dr. Gomez told tech to inform pt he should only have contrast again in the future when premedicated and at a hospital. Not at an outpatient facility.      Dye [Contrast Dye] Other (See Comments) and Hives     Moderate flushing, CT contrast     Golimumab      Hives, bradycardia, face swelling     Neurontin [Gabapentin] Hives     Moderate hives     Nortriptyline Hives     Varicella Virus Vaccine Live      Rash     Flagyl [Metronidazole Hcl] Palpitations and Hives     Latex Rash     Metronidazole Palpitations, Other (See Comments) and Rash     dizziness (versus ciprofloxacin taken at same time)     No Clinical Screening - See  "Comments Rash     Nitrile gloves       I have reviewed the Medications, Allergies, Past Medical and Surgical History, and Social History in the Epic system.    Review of Systems   Constitutional: Positive for fatigue. Negative for fever.   Gastrointestinal: Positive for diarrhea and nausea.   Neurological: Positive for headaches. Negative for weakness.   Psychiatric/Behavioral: Positive for hallucinations (colors, no auditory). Negative for suicidal ideas.   All other systems reviewed and are negative.      Physical Exam   BP: (!) 163/108  Pulse: 74  Temp: 99.1  F (37.3  C)  Resp: 18  Height: 195.6 cm (6' 5\")  Weight: 145.2 kg (320 lb)  SpO2: 97 %      Physical Exam  Physical Exam   Constitutional: oriented to person, place, and time. appears well-developed and well-nourished.   HENT:   Head: Normocephalic and atraumatic.  No icterus or injection.  Pupils equal and reactive to light.  No nystagmus.  Neck: Normal range of motion. Supple neck, no rigidity.  Pulmonary/Chest: Effort normal. No respiratory distress.   Cardiac: No murmurs, rubs, gallops. RRR.  Abdominal: Abdomen soft, nontender, nondistended. No rebound tenderness.  MSK: Long bones without deformity or evidence of trauma  Neurological: alert and oriented to person, place, and time. Strength 5 out of 5 in upper and lower extremities.  Gait intact.  Sensation grossly intact in upper and lower extremities.  Cranial nerves II 12 intact.  Skin: Skin is warm and dry.   Psychiatric: Pressured speech, rapid speech, tangential.    ED Course        Procedures       5:08 PM  The patient was seen and examined by Dr. Obando in Room [facility].   Results for orders placed or performed during the hospital encounter of 08/08/19   CBC with platelets differential   Result Value Ref Range    WBC 13.6 (H) 4.0 - 11.0 10e9/L    RBC Count 5.72 4.4 - 5.9 10e12/L    Hemoglobin 16.3 13.3 - 17.7 g/dL    Hematocrit 50.0 40.0 - 53.0 %    MCV 87 78 - 100 fl    MCH 28.5 26.5 - 33.0 pg    MCHC " 32.6 31.5 - 36.5 g/dL    RDW 13.2 10.0 - 15.0 %    Platelet Count 316 150 - 450 10e9/L    Diff Method Automated Method     % Neutrophils 54.0 %    % Lymphocytes 34.8 %    % Monocytes 8.6 %    % Eosinophils 1.3 %    % Basophils 0.9 %    % Immature Granulocytes 0.4 %    Nucleated RBCs 0 0 /100    Absolute Neutrophil 7.3 1.6 - 8.3 10e9/L    Absolute Lymphocytes 4.7 0.8 - 5.3 10e9/L    Absolute Monocytes 1.2 0.0 - 1.3 10e9/L    Absolute Eosinophils 0.2 0.0 - 0.7 10e9/L    Absolute Basophils 0.1 0.0 - 0.2 10e9/L    Abs Immature Granulocytes 0.1 0 - 0.4 10e9/L    Absolute Nucleated RBC 0.0    Basic metabolic panel   Result Value Ref Range    Sodium 138 133 - 144 mmol/L    Potassium 4.5 3.4 - 5.3 mmol/L    Chloride 106 94 - 109 mmol/L    Carbon Dioxide 22 20 - 32 mmol/L    Anion Gap 10 3 - 14 mmol/L    Glucose 95 70 - 99 mg/dL    Urea Nitrogen 13 7 - 30 mg/dL    Creatinine 0.88 0.66 - 1.25 mg/dL    GFR Estimate >90 >60 mL/min/[1.73_m2]    GFR Estimate If Black >90 >60 mL/min/[1.73_m2]    Calcium 9.8 8.5 - 10.1 mg/dL   Drug abuse screen 6 urine (chem dep)   Result Value Ref Range    Amphetamine Qual Urine Negative NEG^Negative    Barbiturates Qual Urine Negative NEG^Negative    Benzodiazepine Qual Urine Negative NEG^Negative    Cannabinoids Qual Urine Negative NEG^Negative    Cocaine Qual Urine Negative NEG^Negative    Ethanol Qual Urine Negative NEG^Negative    Opiates Qualitative Urine Negative NEG^Negative     *Note: Due to a large number of results and/or encounters for the requested time period, some results have not been displayed. A complete set of results can be found in Results Review.         Labs Ordered and Resulted from Time of ED Arrival Up to the Time of Departure from the ED   CBC WITH PLATELETS DIFFERENTIAL   BASIC METABOLIC PANEL   DRUG ABUSE SCREEN 6 CHEM DEP URINE ([facility])            Assessments & Plan (with Medical Decision Making)   MDM  Patient is here mainly for his possible manic episode.  He does  have pressured speech, questionable whether his color vision is due to migraine aura versus psychosis.  It does sound like he has had hallucinations in the past but this is not unusual for him.  He will be given Zyprexa, Toradol for his headache.  No signs of meningismus, very unlikely meningitis or encephalitis.  Patient is afebrile and well-appearing.  He is having no signs of upper respiratory infection.  Vitals are completely stable here in the emergency department.    Re eval: WBC is mildly elevated, he has no specific infectious symptoms, this is likely nonspecific, patient is nontoxic.  Patient symptoms vastly improved after Zyprexa.  He is also given Toradol for his headache.  His headache is now gone.  Very unlikely intercranial event, no trauma, not on blood thinners and he has a history of this headache.  The headache is not sudden or maximal in onset.  Did have a behavioral  talk to this patient, she does not think he needs acute inpatient treatment and I agree.  Patient is not suicidal or homicidal.  Patient will call his psychiatrist tomorrow for follow-up appointment to discuss his meds.  He has decreased his Cymbalta on his own but is continue to take his Lamictal.  Discussed that he should talk to his psychiatrist about his medications.  The patient will return if symptoms are worsening.    I have reviewed the nursing notes.    I have reviewed the findings, diagnosis, plan and need for follow up with the patient.       Medication List      There are no discharge medications for this visit.         Final diagnoses:   Nonintractable headache, unspecified chronicity pattern, unspecified headache type     I, Patricia Kapoor, am serving as a trained medical scribe to document services personally performed by Shady Obando MD, based on the provider's statements to me.      I, Shady Obando MD, was physically present and have reviewed and verified the accuracy of this note documented by Patricia  Antwon.     8/8/2019   [facility], [place], EMERGENCY DEPARTMENT     Shady Obando MD  08/08/19 [id number]

## 2019-08-09 NOTE — DISCHARGE INSTRUCTIONS
Please call your psychiatrist tomorrow for follow-up appointment, please discuss your medications with your psychiatrist.    Return to the emergency department if your symptoms worsen or if you have further concerns.

## 2019-08-12 ENCOUNTER — OFFICE VISIT (OUTPATIENT)
Dept: FAMILY MEDICINE | Facility: CLINIC | Age: 44
End: 2019-08-12
Payer: MEDICARE

## 2019-08-12 VITALS
HEIGHT: 77 IN | DIASTOLIC BLOOD PRESSURE: 85 MMHG | BODY MASS INDEX: 37.19 KG/M2 | TEMPERATURE: 99.2 F | RESPIRATION RATE: 20 BRPM | SYSTOLIC BLOOD PRESSURE: 128 MMHG | OXYGEN SATURATION: 98 % | WEIGHT: 315 LBS | HEART RATE: 73 BPM

## 2019-08-12 DIAGNOSIS — G44.219 EPISODIC TENSION-TYPE HEADACHE, NOT INTRACTABLE: Primary | ICD-10-CM

## 2019-08-12 DIAGNOSIS — F33.3 SEVERE EPISODE OF RECURRENT MAJOR DEPRESSIVE DISORDER, WITH PSYCHOTIC FEATURES (H): Chronic | ICD-10-CM

## 2019-08-12 DIAGNOSIS — F60.3 BORDERLINE PERSONALITY DISORDER (H): ICD-10-CM

## 2019-08-12 DIAGNOSIS — M45.8 ANKYLOSING SPONDYLITIS OF SACRAL REGION (H): ICD-10-CM

## 2019-08-12 PROCEDURE — 99214 OFFICE O/P EST MOD 30 MIN: CPT | Performed by: NURSE PRACTITIONER

## 2019-08-12 RX ORDER — MULTIVITAMIN WITH IRON
1 TABLET ORAL DAILY
Qty: 60 TABLET | Refills: 1 | Status: SHIPPED | OUTPATIENT
Start: 2019-08-12 | End: 2019-09-23

## 2019-08-12 ASSESSMENT — PAIN SCALES - GENERAL: PAINLEVEL: MILD PAIN (3)

## 2019-08-12 ASSESSMENT — MIFFLIN-ST. JEOR: SCORE: 2453.43
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
## 2019-08-20 ENCOUNTER — MYC REFILL (OUTPATIENT)
Dept: FAMILY MEDICINE | Facility: CLINIC | Age: 44
End: 2019-08-20

## 2019-08-20 DIAGNOSIS — M51.369 DDD (DEGENERATIVE DISC DISEASE), LUMBAR: ICD-10-CM

## 2019-08-20 DIAGNOSIS — M45.8 ANKYLOSING SPONDYLITIS OF SACRAL REGION (H): ICD-10-CM

## 2019-08-20 NOTE — TELEPHONE ENCOUNTER
Requested Prescriptions   Pending Prescriptions Disp Refills     oxyCODONE (ROXICODONE) 5 MG tablet\  Last Written Prescription Date:  7/31/19  Last Fill Quantity: 35 tablet,  # refills: 0   Last office visit: 8/12/2019 with prescribing provider:  Dr. Lyles   Future Office Visit:   Next 5 appointments (look out 90 days)    Oct 02, 2019  1:20 PM CDT  [facility]il Baxter [facility]  [facility] [address] [place] [id number]  [phone]          35 tablet 0     Sig: Take 1-2 tablets (5-10 mg) by mouth every 6 hours as needed for pain (maximum 4 tablet(s) per day)       There is no refill protocol information for this order

## 2019-08-21 RX ORDER — OXYCODONE HYDROCHLORIDE 5 MG/1
5-10 TABLET ORAL EVERY 6 HOURS PRN
Qty: 35 TABLET | Refills: 0 | Status: SHIPPED | OUTPATIENT
Start: 2019-08-21 | End: 2019-09-13

## 2019-08-21 NOTE — TELEPHONE ENCOUNTER
Controlled Substance Refill Request for Oxycodone  Problem List Complete:  Yes  Problem Detail     Noted:  4/4/2017   Priority:  Medium   Overview Addendum 7/1/2019 12:36 PM by Yuliana Solis RN   Patient is followed by Ksenia Lyles MD for ongoing prescription of pain medication.  All refills should only be approved by this provider, or covering partner.     Medication(s): oxy 5.   Maximum quantity per month: 40  Clinic visit frequency required: Q3  months      Controlled substance agreement: 6/12/19  Encounter-Level CSA - 04/04/2017:            Controlled Substance Agreement - Scan on 4/11/2017  1:30 PM : CONTROLLED SUBSTANCE AGREEMENT (below)                   Pain Clinic evaluation in the past: Yes     DIRE Total Score(s):  No flowsheet data found.     Last MNP website verification:  02/25/19, 7/1/19   [email or web address]       checked in past 3 months?  Yes 7/1/19   Last Written Prescription Date:  7/31/19  Last Fill Quantity: 35 tablets  # refills: 0   Last office visit: 8/12/2019 with prescribing provider:  8/12/19   Future Office Visit:   Next 5 appointments (look out 90 days)    Oct 02, 2019  1:20 PM CDT  Return Visit with Oneil Baxter MD  [facility] [address] [address]  [phone]         RX [place] ([place]) reviewed:  not reviewed/not due - last done on 7/1/19    [place] profile:  [email or web address]        .maria fernanda

## 2019-08-27 DIAGNOSIS — G62.9 NEUROPATHY: ICD-10-CM

## 2019-08-27 NOTE — TELEPHONE ENCOUNTER
Requested Prescriptions   Pending Prescriptions Disp Refills     pregabalin (LYRICA) 150 MG capsule  Last Written Prescription Date:  3/17/19  Last Fill Quantity: 60 capsule,  # refills: 11   Last office visit: 8/12/2019 with prescribing provider:  Dr. Lyles   Future Office Visit:   Next 5 appointments (look out 90 days)    Oct 02, 2019  1:20 PM CDT  Return Visit with Oneil Baxter MD  [facility] ([place] [id number] [facility] [address]  [phone]          Routing refill request to provider for review/approval because:  Drug not on the [facility], UMP or  Health refill protocol or controlled substance   60 capsule 11     Sig: Take 1 capsule (150 mg) by mouth 2 times daily       There is no refill protocol information for this order

## 2019-08-28 RX ORDER — PREGABALIN 150 MG/1
150 CAPSULE ORAL 2 TIMES DAILY
Qty: 60 CAPSULE | Refills: 11
Start: 2019-08-28

## 2019-08-28 NOTE — TELEPHONE ENCOUNTER
30 day supply with 11 refills sent on 3/17/19. Should have refills on file at pharmacy.      Martell Chaves RN, BSN, PHN

## 2019-09-04 ENCOUNTER — MYC MEDICAL ADVICE (OUTPATIENT)
Dept: FAMILY MEDICINE | Facility: CLINIC | Age: 44
End: 2019-09-04

## 2019-09-05 DIAGNOSIS — G62.9 NEUROPATHY: ICD-10-CM

## 2019-09-05 RX ORDER — PREGABALIN 150 MG/1
150 CAPSULE ORAL 2 TIMES DAILY
Qty: 60 CAPSULE | Refills: 5 | Status: SHIPPED | OUTPATIENT
Start: 2019-09-05 | End: 2019-11-06

## 2019-09-05 RX ORDER — PREGABALIN 150 MG/1
150 CAPSULE ORAL 2 TIMES DAILY
Qty: 60 CAPSULE | Refills: 11 | Status: CANCELLED | OUTPATIENT
Start: 2019-09-05

## 2019-09-05 NOTE — TELEPHONE ENCOUNTER
Not exactly sure.  It may be bc it is a controlled substance and they only allow 6 months of refills.  Or he may be getting this paid for by the .  In any case I printed a new prescription to be sent to whatever pharmacy he prefers.

## 2019-09-05 NOTE — TELEPHONE ENCOUNTER
Reason for Call:  Other prescription    Detailed comments: Patient needs medication refilled Lyrica 150 MG please advise fax number [phone]    Phone Number Patient can be reached at: Other phone number: [phone]    Best Time: Any    Can we leave a detailed message on this number? YES    Call taken on 9/5/2019 at 1:33 PM by Hanna Rivera

## 2019-09-05 NOTE — TELEPHONE ENCOUNTER
Patient does not make any sense and is getting angry with MA because he did not understand RN's message that was relayed to him. He keeps saying that [employer] told him that he has no refills. Patient upset and says he wants Dr. Lyles to resolve this issue, because the rest of us don't know the issue.       [id number], MEDICAL ASSISTANT

## 2019-09-05 NOTE — TELEPHONE ENCOUNTER
Team, please return call and clarify why refill is needed.  #60 tabs with 11 refills was written on 3/17/19 and was faxed to same number as listed below (see 3/12/19 telephone encounter in Chart Review). Appears this may be associated with [employer]?   Patient should not need any refills, script from March was enough x 1 year.  Thank you.    Dianne Henderson RN

## 2019-09-06 ENCOUNTER — TRANSFERRED RECORDS (OUTPATIENT)
Dept: HEALTH INFORMATION MANAGEMENT | Facility: CLINIC | Age: 44
End: 2019-09-06

## 2019-09-06 NOTE — TELEPHONE ENCOUNTER
This writer attempted to contact PT on 09/06/19      Reason for call find out which pharmacy pt would like to have Rx sent to. and left message.      If patient calls back:   Relay message find out which pharmacy pt would like Rx faxed to, (read verbatim), document that pt called and close encounter        Cheryl Johnson MA

## 2019-09-11 NOTE — TELEPHONE ENCOUNTER
Reason for Call:  Other prescription    Detailed comments: patient gets Lyrica from the  and patient out of medication. This was approved from the  , and is is called Patient [facility]isted Program.     Need to fax a script, cover page, and shipping address to his home. Fax to [phone].     Patient out of medication. Any other questions about this program call patient.    Phone Number Patient can be reached at: Home number on file [phone] (home)    Best Time: any    Can we leave a detailed message on this number? YES    Call taken on 9/11/2019 at 8:52 AM by Ursula Ferreira

## 2019-09-13 ENCOUNTER — MYC REFILL (OUTPATIENT)
Dept: FAMILY MEDICINE | Facility: CLINIC | Age: 44
End: 2019-09-13

## 2019-09-13 DIAGNOSIS — M45.8 ANKYLOSING SPONDYLITIS OF SACRAL REGION (H): ICD-10-CM

## 2019-09-13 DIAGNOSIS — E11.8 TYPE 2 DIABETES MELLITUS WITH COMPLICATION, WITHOUT LONG-TERM CURRENT USE OF INSULIN (H): ICD-10-CM

## 2019-09-13 DIAGNOSIS — E55.9 VITAMIN D DEFICIENCY: ICD-10-CM

## 2019-09-13 DIAGNOSIS — M51.369 DDD (DEGENERATIVE DISC DISEASE), LUMBAR: ICD-10-CM

## 2019-09-13 LAB
CHOLEST SERPL-MCNC: 150 MG/DL
HBA1C MFR BLD: 5.9 % (ref 0–5.6)
HDLC SERPL-MCNC: 33 MG/DL
LDLC SERPL CALC-MCNC: ABNORMAL MG/DL
LDLC SERPL DIRECT ASSAY-MCNC: 64 MG/DL
NONHDLC SERPL-MCNC: 117 MG/DL
TRIGL SERPL-MCNC: 468 MG/DL

## 2019-09-13 PROCEDURE — 36415 COLL VENOUS BLD VENIPUNCTURE: CPT | Performed by: FAMILY MEDICINE

## 2019-09-13 PROCEDURE — 82306 VITAMIN D 25 HYDROXY: CPT | Performed by: FAMILY MEDICINE

## 2019-09-13 PROCEDURE — 80061 LIPID PANEL: CPT | Performed by: FAMILY MEDICINE

## 2019-09-13 PROCEDURE — 83721 ASSAY OF BLOOD LIPOPROTEIN: CPT | Mod: 59 | Performed by: FAMILY MEDICINE

## 2019-09-13 PROCEDURE — 83036 HEMOGLOBIN GLYCOSYLATED A1C: CPT | Performed by: FAMILY MEDICINE

## 2019-09-13 NOTE — TELEPHONE ENCOUNTER
Requested Prescriptions   Pending Prescriptions Disp Refills     oxyCODONE (ROXICODONE) 5 MG tablet  Last Written Prescription Date:  8/21/19  Last Fill Quantity: 35 tablet,  # refills: 0   Last office visit: 8/12/2019 with prescribing provider:  Dr. Lyels   Future Office Visit:   Next 5 appointments (look out 90 days)    Oct 02, 2019  1:20 PM CDT  Return Visit with Oneil Baxter MD  [facility] ([address] [address]  [phone]   Dec 12, 2019 10:00 AM CST  Return Visit with Elaine Segovia MD  [facility] ([facility]) [address] [place] [address] [phone]          Routing refill request to provider for review/approval because:  Drug not on the FMG, UMP or [facility] refill protocol or controlled substance   35 tablet 0     Sig: Take 1-2 tablets (5-10 mg) by mouth every 6 hours as needed for pain (maximum 4 tablet(s) per day)       There is no refill protocol information for this order

## 2019-09-16 RX ORDER — OXYCODONE HYDROCHLORIDE 5 MG/1
5-10 TABLET ORAL EVERY 6 HOURS PRN
Qty: 35 TABLET | Refills: 0 | Status: SHIPPED | OUTPATIENT
Start: 2019-09-16 | End: 2019-09-27

## 2019-09-16 NOTE — TELEPHONE ENCOUNTER
Controlled Substance Refill Request for Oxycodone  Problem List Complete:  Yes  Chronic pain syndrome (Chronic)   Problem Detail     Noted:  4/4/2017   Priority:  Medium   Overview Addendum 7/1/2019 12:36 PM by Yuliana Solis RN   Patient is followed by Ksenia Lyles MD for ongoing prescription of pain medication.  All refills should only be approved by this provider, or covering partner.     Medication(s): oxy 5.   Maximum quantity per month: 40  Clinic visit frequency required: Q3  months      Controlled substance agreement: 6/12/19  Encounter-Level CSA - 04/04/2017:            Controlled Substance Agreement - Scan on 4/11/2017  1:30 PM : CONTROLLED SUBSTANCE AGREEMENT (below)                   Pain Clinic evaluation in the past: Yes     DIRE Total Score(s):  No flowsheet data found.     Last [facility] website verification:  02/25/19, 7/1/19   [email or web address]       checked in past 3 months?  Yes 7/1/19   Last Written Prescription Date:  8/21/19  Last Fill Quantity: 35 tablets  # refills: 0   Last office visit: 8/12/2019 with prescribing provider:  8/12/19   Future Office Visit:   Next 5 appointments (look out 90 days)    Oct 02, 2019  1:20 PM CDT  Return Visit with Oenil Baxter MD  [facility] ([address] [address]  [phone]   Dec 12, 2019 10:00 AM CST  Return Visit with Elaine Segovia MD  [facility] ([facility]) [address] [place] [address]  [phone]         RX monitoring program ([place]) reviewed:  not reviewed/not due - last done on 7/1/19    [place] profile:  [email or web address]        Martell Chaves RN, BSN, PHN

## 2019-09-17 ENCOUNTER — ANCILLARY PROCEDURE (OUTPATIENT)
Dept: GENERAL RADIOLOGY | Facility: CLINIC | Age: 44
End: 2019-09-17
Attending: NEUROLOGICAL SURGERY
Payer: MEDICARE

## 2019-09-17 ENCOUNTER — APPOINTMENT (OUTPATIENT)
Dept: GENERAL RADIOLOGY | Facility: CLINIC | Age: 44
End: 2019-09-17
Payer: MEDICARE

## 2019-09-17 DIAGNOSIS — M54.16 LUMBAR RADICULOPATHY: ICD-10-CM

## 2019-09-17 PROCEDURE — 72110 X-RAY EXAM L-2 SPINE 4/>VWS: CPT | Mod: FY

## 2019-09-18 LAB — DEPRECATED CALCIDIOL+CALCIFEROL SERPL-MC: 40 UG/L (ref 20–75)

## 2019-09-18 NOTE — RESULT ENCOUNTER NOTE
Kassy Lyles is out of the office and I am reviewing your results.    Your vitamin D level was normal.  Continue supplement if you have been taking.   Please call or MyChart my office with any questions or concerns.    Faith Camacho, PAC

## 2019-09-21 ENCOUNTER — NURSE TRIAGE (OUTPATIENT)
Dept: NURSING | Facility: CLINIC | Age: 44
End: 2019-09-21

## 2019-09-21 NOTE — TELEPHONE ENCOUNTER
"Patient planning UC to get a Medrol dosepack if OK with provider there.  Yessenia Olivares RN  [facility]      Reason for Disposition    [1] SEVERE back pain (e.g., excruciating, unable to do any normal activities) AND [2] not improved 2 hours after pain medicine    Additional Information    Negative: Passed out (i.e., lost consciousness, collapsed and was not responding)    Negative: Shock suspected (e.g., cold/pale/clammy skin, too weak to stand, low BP, rapid pulse)    Negative: Sounds like a life-threatening emergency to the triager    Negative: Major injury to the back (e.g., MVA, fall > 10 feet or 3 meters, penetrating injury, etc.)    Negative: Followed a tailbone injury    Negative: [1] Pain in the upper back over the ribs (rib cage) AND [2] radiates (travels, goes) into chest    Negative: [1] Pain in the upper back over the ribs (rib cage) AND [2] worsened by coughing (or clearly increases with breathing)    Negative: Back pain during pregnancy    Negative: Pain mainly in flank (i.e., in the side, over the lower ribs or just below the ribs)    Negative: [1] SEVERE back pain (e.g., excruciating) AND [2] sudden onset AND [3] age > 60    Negative: [1] Unable to urinate (or only a few drops) > 4 hours AND     [2] bladder feels very full (e.g., palpable bladder or strong urge to urinate)    Negative: [1] Urinary or bowel incontinence (i.e., loss of bladder or bowel control) AND [2] new onset    Negative: Numbness in groin or rectal area (i.e., loss of sensation)    Negative: [1] SEVERE abdominal pain AND [2] present > 1 hour    Negative: [1] Abdominal pain AND [2] age > 60    Negative: Weakness of a leg or foot (e.g., unable to bear weight, dragging foot)    Negative: Unable to walk    Negative: Patient sounds very sick or weak to the triager    Answer Assessment - Initial Assessment Questions  1. ONSET: \"When did the pain begin?\"       months  2. LOCATION: \"Where does it hurt?\" (upper, mid or lower " "back)      Low back pain  3. SEVERITY: \"How bad is the pain?\"  (e.g., Scale 1-10; mild, moderate, or severe)    - MILD (1-3): doesn't interfere with normal activities     - MODERATE (4-7): interferes with normal activities or awakens from sleep     - SEVERE (8-10): excruciating pain, unable to do any normal activities       severe  4. PATTERN: \"Is the pain constant?\" (e.g., yes, no; constant, intermittent)       constant  5. RADIATION: \"Does the pain shoot into your legs or elsewhere?\"      no  6. CAUSE:  \"What do you think is causing the back pain?\"       Has chronic back problems  7. BACK OVERUSE:  \"Any recent lifting of heavy objects, strenuous work or exercise?\"      no  8. MEDICATIONS: \"What have you taken so far for the pain?\" (e.g., nothing, acetaminophen, NSAIDS)      oxycodone  9. NEUROLOGIC SYMPTOMS: \"Do you have any weakness, numbness, or problems with bowel/bladder control?\"      Leg pain  10. OTHER SYMPTOMS: \"Do you have any other symptoms?\" (e.g., fever, abdominal pain, burning with urination, blood in urine)        no  11. PREGNANCY: \"Is there any chance you are pregnant?\" (e.g., yes, no; LMP)        no    Protocols used: BACK PAIN-A-AH      "

## 2019-09-22 ENCOUNTER — MYC REFILL (OUTPATIENT)
Dept: FAMILY MEDICINE | Facility: CLINIC | Age: 44
End: 2019-09-22

## 2019-09-22 DIAGNOSIS — I10 ESSENTIAL HYPERTENSION WITH GOAL BLOOD PRESSURE LESS THAN 140/90: Chronic | ICD-10-CM

## 2019-09-22 DIAGNOSIS — I10 HYPERTENSION GOAL BP (BLOOD PRESSURE) < 140/90: Chronic | ICD-10-CM

## 2019-09-23 ENCOUNTER — MYC REFILL (OUTPATIENT)
Dept: FAMILY MEDICINE | Facility: CLINIC | Age: 44
End: 2019-09-23

## 2019-09-23 ENCOUNTER — OFFICE VISIT (OUTPATIENT)
Dept: FAMILY MEDICINE | Facility: CLINIC | Age: 44
End: 2019-09-23
Payer: MEDICARE

## 2019-09-23 VITALS
SYSTOLIC BLOOD PRESSURE: 130 MMHG | OXYGEN SATURATION: 96 % | DIASTOLIC BLOOD PRESSURE: 77 MMHG | WEIGHT: 315 LBS | BODY MASS INDEX: 37.19 KG/M2 | HEIGHT: 77 IN | TEMPERATURE: 98.2 F | HEART RATE: 93 BPM

## 2019-09-23 DIAGNOSIS — M79.675 PAIN OF LEFT GREAT TOE: ICD-10-CM

## 2019-09-23 DIAGNOSIS — L03.031 PARONYCHIA OF TOE, RIGHT: Primary | ICD-10-CM

## 2019-09-23 DIAGNOSIS — E78.1 HYPERTRIGLYCERIDEMIA: ICD-10-CM

## 2019-09-23 PROCEDURE — 99213 OFFICE O/P EST LOW 20 MIN: CPT | Performed by: FAMILY MEDICINE

## 2019-09-23 RX ORDER — MUPIROCIN 20 MG/G
OINTMENT TOPICAL 3 TIMES DAILY
Qty: 22 G | Refills: 1 | Status: SHIPPED | OUTPATIENT
Start: 2019-09-23 | End: 2019-10-08

## 2019-09-23 ASSESSMENT — MIFFLIN-ST. JEOR: SCORE: 2448.89

## 2019-09-23 NOTE — TELEPHONE ENCOUNTER
"Requested Prescriptions   Pending Prescriptions Disp Refills     ramipril (ALTACE) 10 MG capsule 90 capsule 1     Sig: Take 1 capsule (10 mg) by mouth daily       ACE Inhibitors (Including Combos) Protocol Passed - 9/23/2019  7:25 AM        Passed - Blood pressure under 140/90 in past 12 months     BP Readings from Last 3 Encounters:   08/12/19 128/85   08/08/19 120/85   08/01/19 112/74                 Passed - Recent (12 mo) or future (30 days) visit within the authorizing provider's specialty     Patient had office visit in the last 12 months or has a visit in the next 30 days with authorizing provider or within the authorizing provider's specialty.  See \"Patient Info\" tab in inbasket, or \"Choose Columns\" in Meds & Orders section of the refill encounter.              Passed - Medication is active on med list        Passed - Patient is age 18 or older        Passed - Normal serum creatinine on file in past 12 months     Recent Labs   Lab Test 08/08/19  1658   CR 0.88             Passed - Normal serum potassium on file in past 12 months     Recent Labs   Lab Test 08/08/19  1658   POTASSIUM 4.5             metoprolol succinate ER (TOPROL-XL) 200 MG 24 hr tablet 180 tablet 0       Beta-Blockers Protocol Passed - 9/23/2019  7:25 AM        Passed - Blood pressure under 140/90 in past 12 months     BP Readings from Last 3 Encounters:   08/12/19 128/85   08/08/19 120/85   08/01/19 112/74                 Passed - Patient is age 6 or older        Passed - Recent (12 mo) or future (30 days) visit within the authorizing provider's specialty     Patient had office visit in the last 12 months or has a visit in the next 30 days with authorizing provider or within the authorizing provider's specialty.  See \"Patient Info\" tab in inbasket, or \"Choose Columns\" in Meds & Orders section of the refill encounter.              Passed - Medication is active on med list        ramipril (ALTACE) 10 MG capsule  Last Written Prescription " Date:  3/13/19  Last Fill Quantity: 90,  # refills: 1   Last office visit: 8/12/2019 with prescribing provider:  Dr. Lyles   Future Office Visit:   Next 5 appointments (look out 90 days)    Oct 02, 2019  1:20 PM CDT  Return Visit with Oneil Baxter MD  [facility] ([facility]) [address] [address]  [phone]   Dec 12, 2019 10:00 AM CST  Return Visit with Elaine Segovia MD  [facility] ([facility]) [address] [place] [address]  [phone]           metoprolol succinate ER (TOPROL-XL) 200 MG 24 hr tablet  Last Written Prescription Date:  6/26/19  Last Fill Quantity: 180,  # refills: 0   Last office visit: 8/12/2019 with prescribing provider:  Dr. Lyles   Future Office Visit:   Next 5 appointments (look out 90 days)    Oct 02, 2019  1:20 PM CDT  Return Visit with Oneil Baxter MD  [facility] ([facility]) [address] [facility] [address]  [phone]   Dec 12, 2019 10:00 AM CST  Return Visit with Elaine Segovia MD  [facility] ([facility]) [address] [place] [address]  [phone]

## 2019-09-23 NOTE — PROGRESS NOTES
"Subjective     Joel Pineda is a 44 year old male who presents to clinic today for the following health issues:    HPI   Musculoskeletal problem/pain      Duration: 1 week for the left toe and the right toe a few hours    Description  Location: both big toes    Intensity:  moderate    Accompanying signs and symptoms: numbness, warmth, redness and sharp pain    History  Previous similar problem: YES- in july  Previous evaluation:  none    Precipitating or alleviating factors:  Trauma or overuse: no   Aggravating factors include: sitting, standing, walking and climbing stairs    Therapies tried and outcome: epsom salt bath      Reviewed and updated as needed this visit by Provider  Tobacco  Allergies  Meds  Problems  Med Hx  Surg Hx  Fam Hx         Review of Systems   ROS COMP: Constitutional, HEENT, cardiovascular, pulmonary, gi and gu systems are negative, except as otherwise noted.      Objective    /77   Pulse 93   Temp 98.2  F (36.8  C) (Oral)   Ht 1.956 m (6' 5\")   Wt 145.2 kg (320 lb)   SpO2 96%   BMI 37.95 kg/m    Body mass index is 37.95 kg/m .  Physical Exam   GENERAL: healthy, alert, no distress and obese  MS: no gross musculoskeletal defects noted, no edema  SKIN: erythema of medial right great toe without signs of abscess, no redness of left great toe.  PSYCH: mentation appears normal and affect flat    Diagnostic Test Results:  Labs reviewed in Epic        Assessment & Plan     1. Paronychia of toe, right  Topical treatment and referral for possible nail procedure.  - mupirocin (BACTROBAN) 2 % external ointment; Apply topically 3 times daily for 5 days  Dispense: 22 g; Refill: 1  - PODIATRY/FOOT & ANKLE SURGERY REFERRAL    2. Pain of left great toe    - mupirocin (BACTROBAN) 2 % external ointment; Apply topically 3 times daily for 5 days  Dispense: 22 g; Refill: 1  - PODIATRY/FOOT & ANKLE SURGERY REFERRAL     BMI:   Estimated body mass index is 37.95 kg/m  as calculated from the " "following:    Height as of this encounter: 1.956 m (6' 5\").    Weight as of this encounter: 145.2 kg (320 lb).   Weight management plan: Discussed healthy diet and exercise guidelines    The uses and side effects, including black box warnings as appropriate, were discussed in detail.  All patient questions were answered.  The patient was instructed to call immediately if any side effects developed.     Return in about 3 days (around 9/26/2019), or if symptoms worsen or fail to improve.    Cindy Webber MD  [facility]      "

## 2019-09-23 NOTE — TELEPHONE ENCOUNTER
"Requested Prescriptions   Pending Prescriptions Disp Refills     omega-3 acid ethyl esters (LOVAZA) 1 g capsule 360 capsule 0     Sig: TAKE 2 CAPSULES BY MOUTH TWICE DAILY.       Antihyperlipidemic agents Passed - 9/23/2019 10:44 AM        Passed - Lipid panel on file in past 12 mos     Recent Labs   Lab Test 09/13/19  0943  12/03/14  0958   CHOL 150   < > 189   TRIG 468*   < > 487*   HDL 33*   < > 27*   LDL Cannot estimate LDL when triglyceride exceeds 400 mg/dL  64   < > Cannot estimate LDL when triglyceride exceeds 400 mg/dL  108   NHDL 117   < >  --    VLDL  --   --  Cannot estimate VLDL when triglyceride exceeds 400 mg/dL.   CHOLHDLRATIO  --   --  7.0*    < > = values in this interval not displayed.               Passed - Normal serum ALT on record in past 12 mos     Recent Labs   Lab Test 07/21/19  1954   ALT 49             Passed - Recent (12 mo) or future (30 days) visit within the authorizing provider's specialty     Patient had office visit in the last 12 months or has a visit in the next 30 days with authorizing provider or within the authorizing provider's specialty.  See \"Patient Info\" tab in inbasket, or \"Choose Columns\" in Meds & Orders section of the refill encounter.              Passed - Medication is active on med list        Passed - Patient is age 18 years or older        omega-3 acid ethyl esters (LOVAZA) 1 g capsule  Last Written Prescription Date:  6/6/19  Last Fill Quantity: 360,  # refills: 0   Last office visit: 8/12/2019 with prescribing provider:  Dr. Lyles   Future Office Visit:   Next 5 appointments (look out 90 days)    Oct 02, 2019  1:20 PM CDT  Return Visit with Oneil Baxter MD  [facility] ([facility]) [address] [place] [address]  [phone]   Dec 12, 2019 10:00 AM CST  Return Visit with Elaine Segovia MD  [facility] ([facility]) [address] [place] [address]  [phone] "

## 2019-09-24 RX ORDER — RAMIPRIL 10 MG/1
10 CAPSULE ORAL DAILY
Qty: 90 CAPSULE | Refills: 1 | Status: SHIPPED | OUTPATIENT
Start: 2019-09-24 | End: 2019-12-16

## 2019-09-24 RX ORDER — OMEGA-3-ACID ETHYL ESTERS 1 G/1
CAPSULE, LIQUID FILLED ORAL
Qty: 360 CAPSULE | Refills: 2 | Status: SHIPPED | OUTPATIENT
Start: 2019-09-24 | End: 2019-12-16

## 2019-09-24 RX ORDER — METOPROLOL SUCCINATE 200 MG/1
TABLET, EXTENDED RELEASE ORAL
Qty: 180 TABLET | Refills: 0 | Status: SHIPPED | OUTPATIENT
Start: 2019-09-24 | End: 2019-12-16

## 2019-09-24 NOTE — TELEPHONE ENCOUNTER
Pres[place] per [facility] [place].    Martell Chaves RN, BSN, PHN

## 2019-09-24 NOTE — TELEPHONE ENCOUNTER
Prescription approved per [facility] Refill Protocol.    Martell Chaves RN, BSN, PHN

## 2019-09-26 DIAGNOSIS — M45.8 ANKYLOSING SPONDYLITIS OF SACRAL REGION (H): ICD-10-CM

## 2019-09-26 DIAGNOSIS — Z79.899 HIGH RISK MEDICATION USE: ICD-10-CM

## 2019-09-26 LAB
ALBUMIN SERPL-MCNC: 4.3 G/DL (ref 3.4–5)
ALP SERPL-CCNC: 107 U/L (ref 40–150)
ALT SERPL W P-5'-P-CCNC: 53 U/L (ref 0–70)
AST SERPL W P-5'-P-CCNC: 48 U/L (ref 0–45)
BASOPHILS # BLD AUTO: 0.1 10E9/L (ref 0–0.2)
BASOPHILS NFR BLD AUTO: 0.9 %
BILIRUB DIRECT SERPL-MCNC: 0.1 MG/DL (ref 0–0.2)
BILIRUB SERPL-MCNC: 0.4 MG/DL (ref 0.2–1.3)
CREAT SERPL-MCNC: 0.83 MG/DL (ref 0.66–1.25)
CRP SERPL-MCNC: <2.9 MG/L (ref 0–8)
DIFFERENTIAL METHOD BLD: NORMAL
EOSINOPHIL # BLD AUTO: 0.2 10E9/L (ref 0–0.7)
EOSINOPHIL NFR BLD AUTO: 2.3 %
ERYTHROCYTE [DISTWIDTH] IN BLOOD BY AUTOMATED COUNT: 13.8 % (ref 10–15)
ERYTHROCYTE [SEDIMENTATION RATE] IN BLOOD BY WESTERGREN METHOD: 4 MM/H (ref 0–15)
GFR SERPL CREATININE-BSD FRML MDRD: >90 ML/MIN/{1.73_M2}
HCT VFR BLD AUTO: 46.2 % (ref 40–53)
HGB BLD-MCNC: 15.2 G/DL (ref 13.3–17.7)
LYMPHOCYTES # BLD AUTO: 3.5 10E9/L (ref 0.8–5.3)
LYMPHOCYTES NFR BLD AUTO: 44.9 %
MCH RBC QN AUTO: 29.5 PG (ref 26.5–33)
MCHC RBC AUTO-ENTMCNC: 32.9 G/DL (ref 31.5–36.5)
MCV RBC AUTO: 90 FL (ref 78–100)
MONOCYTES # BLD AUTO: 0.9 10E9/L (ref 0–1.3)
MONOCYTES NFR BLD AUTO: 12.2 %
NEUTROPHILS # BLD AUTO: 3.1 10E9/L (ref 1.6–8.3)
NEUTROPHILS NFR BLD AUTO: 39.7 %
PLATELET # BLD AUTO: 262 10E9/L (ref 150–450)
PROT SERPL-MCNC: 7.7 G/DL (ref 6.8–8.8)
RBC # BLD AUTO: 5.15 10E12/L (ref 4.4–5.9)
WBC # BLD AUTO: 7.7 10E9/L (ref 4–11)

## 2019-09-26 PROCEDURE — 36415 COLL VENOUS BLD VENIPUNCTURE: CPT | Performed by: INTERNAL MEDICINE

## 2019-09-26 PROCEDURE — 85652 RBC SED RATE AUTOMATED: CPT | Performed by: INTERNAL MEDICINE

## 2019-09-26 PROCEDURE — 82565 ASSAY OF CREATININE: CPT | Performed by: INTERNAL MEDICINE

## 2019-09-26 PROCEDURE — 86140 C-REACTIVE PROTEIN: CPT | Performed by: INTERNAL MEDICINE

## 2019-09-26 PROCEDURE — 85025 COMPLETE CBC W/AUTO DIFF WBC: CPT | Performed by: INTERNAL MEDICINE

## 2019-09-26 PROCEDURE — 80076 HEPATIC FUNCTION PANEL: CPT | Performed by: INTERNAL MEDICINE

## 2019-09-27 ENCOUNTER — OFFICE VISIT (OUTPATIENT)
Dept: FAMILY MEDICINE | Facility: CLINIC | Age: 44
End: 2019-09-27
Payer: MEDICARE

## 2019-09-27 VITALS
WEIGHT: 315 LBS | HEART RATE: 77 BPM | RESPIRATION RATE: 24 BRPM | OXYGEN SATURATION: 98 % | HEIGHT: 77 IN | BODY MASS INDEX: 37.19 KG/M2 | SYSTOLIC BLOOD PRESSURE: 118 MMHG | TEMPERATURE: 97.7 F | DIASTOLIC BLOOD PRESSURE: 82 MMHG

## 2019-09-27 DIAGNOSIS — L03.031 INFECTION OF NAIL BED OF TOE OF RIGHT FOOT: Primary | ICD-10-CM

## 2019-09-27 DIAGNOSIS — M45.8 ANKYLOSING SPONDYLITIS OF SACRAL REGION (H): ICD-10-CM

## 2019-09-27 DIAGNOSIS — M51.369 DDD (DEGENERATIVE DISC DISEASE), LUMBAR: ICD-10-CM

## 2019-09-27 PROCEDURE — 99214 OFFICE O/P EST MOD 30 MIN: CPT | Performed by: NURSE PRACTITIONER

## 2019-09-27 RX ORDER — DOXYCYCLINE 100 MG/1
100 CAPSULE ORAL 2 TIMES DAILY
Qty: 10 CAPSULE | Refills: 0 | Status: SHIPPED | OUTPATIENT
Start: 2019-09-27 | End: 2019-10-08

## 2019-09-27 RX ORDER — QUETIAPINE FUMARATE 50 MG/1
50 TABLET, FILM COATED ORAL 4 TIMES DAILY PRN
COMMUNITY
Start: 2019-09-27 | End: 2019-10-08 | Stop reason: DRUGHIGH

## 2019-09-27 RX ORDER — OXYCODONE HYDROCHLORIDE 5 MG/1
5-10 TABLET ORAL EVERY 6 HOURS PRN
Qty: 35 TABLET | Refills: 0 | Status: SHIPPED | OUTPATIENT
Start: 2019-09-27 | End: 2019-10-16

## 2019-09-27 ASSESSMENT — PAIN SCALES - GENERAL: PAINLEVEL: MILD PAIN (3)

## 2019-09-27 ASSESSMENT — MIFFLIN-ST. JEOR: SCORE: 2453.43

## 2019-09-27 NOTE — PROGRESS NOTES
"    Joel Pineda is a 44 year old male who presents to clinic today for the following health issues:    HPI   Follow up right toe infection  -ointment is not working  -drainage has gotten worse   Would like oral abx-saw blood/pus coming out about pea sized 10 days ago.   -is getting the brain shock coming off the effexor. He is taking cymbalta. He wonders if it is narcotic withdrawal. He took 15 mg a few days ago when his back first was tight, yesterday took 1-2 pills. Also feeling hypomanic. He has seroquel he will take one when he goes home today. Using tens belt is helping.   -he will see podiatry 3rd weekend of October.     Last week had a back spasm and wasn't able to walk or move for a couple days.  -took celebrex to help with that  Last sat was pushing furniture then couldn't move and had to crawl on the floor.     On humara    clindamycin. Possible allergic reaction.     Patient feels that his left ear feels \"funny\", he would like provider to take a look at them        Patient Active Problem List   Diagnosis     Major depressive disorder, recurrent episode (H)     Intermittent asthma     Hyperlipidemia LDL goal <100     Chronic nonallergic rhinitis     Diverticulosis     GERD (gastroesophageal reflux disease)     Anxiety     LLOYD (obstructive sleep apnea)- mild (AHI 11)     Intracranial arachnoid cyst     Facet arthritis of cervical region     Acquired hypothyroidism     Bipolar 2 disorder (H)     Chronic midline low back pain without sciatica     Irritable bowel syndrome with diarrhea     B12 deficiency     Essential hypertension with goal blood pressure less than 140/90     Chronic pain syndrome     Ankylosing spondylitis of sacral region (H)     Morbid obesity due to hypertriglyceridemia (H)     Fatty infiltration of liver     DDD (degenerative disc disease), lumbar     Type 2 diabetes mellitus with complication, without long-term current use of insulin (H)     Peripheral polyneuropathy     History of " pulmonary embolism     Past Surgical History:   Procedure Laterality Date     BACK SURGERY  10/07    lumbar discectomy L5-S1     COLONOSCOPY      Note: colonoscopy scheduled with [facility] on Friday, 9/4/15     COSMETIC SURGERY  2012    Nose Exterior - functional     GI SURGERY  August 2013    Sigmoidectomy     HERNIA REPAIR, UMBILICAL  8/23/11    small, Dr. Evan Beavers     INCISION AND DRAINAGE, ABSCESS, COMPLEX  8/23/11    umbilical, Dr. Evan Beavers     LAPAROSCOPIC ASSISTED COLECTOMY LEFT (DESCENDING)  8/15/2013    Procedure: LAPAROSCOPIC ASSISTED COLECTOMY LEFT (DESCENDING);  Laparoscopic Hand Assisted Sigmoid Resection, Mobilization of Splenic Fissure, coloproctoscopy, *Latex Free Room* Anesthesia General with Pain block  ;  Surgeon: Aurora Justice MD;  Location: [facility] OR     NERVE SURGERY  8/18/11    RF ablation @ L3-S1 @ [facility]     RECONSTRUCT NOSE AND SEPTUM (FUNCTIONAL)  10/14/2011    Procedure:RECONSTRUCT NOSE AND SEPTUM (FUNCTIONAL); Functional Septorhinoplasty, Turbinate Reduction, ; Surgeon:CEDRIC CUEVAS; Location:[facility] OR     SINUS SURGERY  10/1/01    ethmoidectomy chronic sinusitis       Social History     Tobacco Use     Smoking status: Never Smoker     Smokeless tobacco: Never Used   Substance Use Topics     Alcohol use: Yes     Alcohol/week: 0.0 standard drinks     Drinks per session: 1 or 2     Binge frequency: Weekly     Comment: occ 1/week     Family History   Problem Relation Age of Onset     Musculoskeletal Disorder Mother         back     Anxiety Disorder Mother      Colon Polyps Mother      Ulcerative Colitis Mother         and ischemic small intestine, surgery     Hypertension Mother      Breast Cancer Mother      Osteoporosis Mother      Diabetes Mother         Type 2, Diagnosed in 2014     Depression Mother         Takes Cymbalta to help with chronic pain + depx     Thyroid Disease Mother         Hypothyroidism     Obesity Mother         Under much better control latter half of 2015      "Musculoskeletal Disorder Father         back     Substance Abuse Father      Hypertension Father      Hyperlipidemia Father      Depression Father         Off meds for many years. Seems \"ok\"     Heart Disease Maternal Grandmother      Heart Disease Maternal Grandfather      Psychotic Disorder Paternal Grandfather      Suicide Paternal Grandfather      Depression Paternal Grandfather         Pediatrician. Committed suicide by pistol in 1990.     Musculoskeletal Disorder Brother         back     Depression Brother         Expressed as anger and moodiness     Substance Abuse Sister      Depression Sister         Mental Health Therapist, yet no anti-depressants?     Anxiety Disorder Sister         Mental Health Therapist, yet no anti-anxiety meds?     Other Cancer Other         Bladder Cancer - Fatal     Substance Abuse Brother      Colon Cancer No family hx of      Crohn's Disease No family hx of      Anesthesia Reaction No family hx of      Cancer No family hx of         No family history of skin cancer         Current Outpatient Medications   Medication Sig Dispense Refill     doxycycline hyclate (VIBRAMYCIN) 100 MG capsule Take 1 capsule (100 mg) by mouth 2 times daily for 5 days 10 capsule 0     oxyCODONE (ROXICODONE) 5 MG tablet Take 1-2 tablets (5-10 mg) by mouth every 6 hours as needed for pain (maximum 4 tablet(s) per day) 35 tablet 0     QUEtiapine (SEROQUEL) 50 MG tablet Take 1 tablet (50 mg) by mouth 4 times daily as needed       acetaminophen 500 MG CAPS Take 500 mg by mouth every 4 hours as needed 60 capsule      adalimumab (HUMIRA *CF*) 40 MG/0.4ML pen kit Inject 0.4 mLs (40 mg) Subcutaneous every 14 days . Hold for signs of infection, then seek medical attention. 0.8 mL 5     albuterol (PROAIR HFA/PROVENTIL HFA/VENTOLIN HFA) 108 (90 Base) MCG/ACT inhaler Inhale 2 puffs into the lungs every 4 hours as needed for shortness of breath / dyspnea or wheezing 8.5 g 11     ALPRAZolam (XANAX XR) 1 MG 24 hr tablet " Take 1 mg by mouth daily       aspirin (ASA) 81 MG tablet Take 81 mg by mouth daily        blood glucose monitoring (NO BRAND SPECIFIED) meter device kit Use to test blood sugar 2 times daily or as directed. Include test strips (2 boxes) with 3 refills 1 kit 0     celecoxib (CELEBREX) 200 MG capsule TAKE 1 CAPSULE BY MOUTH TWICE DAILY AS NEEDED FOR MODERATE PAIN. 180 capsule 1     cetirizine (ZYRTEC) 10 MG tablet Take 1 tablet (10 mg) by mouth At Bedtime 30 tablet 11     cholecalciferol (VITAMIN D3) 64421 units (1250 mcg) capsule Take one capsule every two weeks. 24 capsule 1     ciprofloxacin-dexamethasone (CIPRODEX) 0.3-0.1 % otic suspension Place 5 drops into the right ear twice a week 1 Bottle 3     clindamycin (CLEOCIN) 150 MG capsule Take 1 capsule (150 mg) by mouth 3 times daily 21 capsule 0     clotrimazole (LOTRIMIN) 1 % external solution Apply 1 mL topically 2 times daily Correct use is 5 drops in right ear twice weekly 10 mL 3     cyanocobalamin (CYANOCOBALAMIN) 1000 MCG/ML injection Inject 1 mL (1,000 mcg) into the muscle every 30 days 10 mL 0     DULoxetine (CYMBALTA) 60 MG capsule Take 60 mg by mouth daily       EPINEPHrine (EPIPEN/ADRENACLICK/OR ANY BX GENERIC EQUIV) 0.3 MG/0.3ML injection 2-pack Inject 0.3 mLs (0.3 mg) into the muscle once as needed for anaphylaxis 0.6 mL 3     famotidine (PEPCID) 10 MG tablet Take 10 mg by mouth Prior to administration of Humira every 2 weeks       fluticasone-salmeterol (ADVAIR DISKUS) 250-50 MCG/DOSE inhaler Inhale 1 puff into the lungs 2 times daily 1 Inhaler 11     Ginger, Zingiber officinalis, (GINGER ROOT) 550 MG CAPS capsule Take 550 mg by mouth Up to three times daily       hydrOXYzine (ATARAX) 50 MG tablet Take  mg by mouth as needed For anxiety and insomnia       lamoTRIgine (LAMICTAL) 100 MG tablet Take 200 mg by mouth daily       levothyroxine (SYNTHROID/LEVOTHROID) 75 MCG tablet TAKE 1 TABLET EVERY MORNING 90 tablet 1     lidocaine (XYLOCAINE) 5 %  external ointment UAD 30 g      loperamide (IMODIUM) 2 MG capsule Take 2 mg by mouth 4 times daily as needed for diarrhea       metFORMIN (GLUCOPHAGE-XR) 500 MG 24 hr tablet Take 2 tablets (1,000 mg) by mouth daily (with dinner) 180 tablet 3     metoclopramide (REGLAN) 5 MG tablet Take 1 tablet (5 mg) by mouth 3 times daily as needed (nausea) 20 tablet 3     metoprolol succinate ER (TOPROL-XL) 200 MG 24 hr tablet TAKE 2 TABLETS (400MG) DAILY 180 tablet 0     montelukast (SINGULAIR) 10 MG tablet Take 1 tablet (10 mg) by mouth every evening 90 tablet 1     mupirocin (BACTROBAN) 2 % external ointment Apply topically 3 times daily for 5 days 22 g 1     omega-3 acid ethyl esters (LOVAZA) 1 g capsule TAKE 2 CAPSULES BY MOUTH TWICE DAILY. 360 capsule 2     omeprazole 20 MG tablet Take 20 mg by mouth daily        ondansetron (ZOFRAN) 8 MG tablet TAKE 1 TABLET BY MOUTH EVERY 8 HOURS AS NEEDED FOR NAUSEA OR VOMITING 20 tablet 4     order for [facility] [facility] REMSTAR 60 Series Auto CPAP 9-13 cm H2O, Wisp nasal mask w/a large cushion and a chinstrap       pregabalin (LYRICA) 150 MG capsule Take 1 capsule (150 mg) by mouth 2 times daily 60 capsule 5     pseudoePHEDrine (SUDAFED) 120 MG 12 hr tablet Take 1 tablet (120 mg) by mouth every 12 hours 28 tablet 0     ramipril (ALTACE) 10 MG capsule Take 1 capsule (10 mg) by mouth daily 90 capsule 1     rizatriptan (MAXALT-MLT) 5 MG ODT tab Take 1 tablet (5 mg) by mouth at onset of headache for migraine 30 tablet 5     rosuvastatin (CRESTOR) 40 MG tablet Take 1 tablet (40 mg) by mouth daily 90 tablet 2     syringe, disposable, (BD TUBERCULIN SYRINGE) 1 ML MISC Equipment being ordered: 1 ml tuberculin syringes to be used for Vitamin B12 injections. 12 each 1     vitamin C (ASCORBIC ACID) 500 MG tablet Take 500 mg by mouth daily       Allergies   Allergen Reactions     Amoxicillin-Pot Clavulanate Difficulty breathing     Banana Shortness Of Breath     Pt reports organic Banana is okay.   "    Nitroglycerin Palpitations     Penicillins Anaphylaxis     Provigil [Modafinil] Shortness Of Breath     headache     Gadolinium Hives and Itching     Patient was premedicated for the contrast allergy. He did still have a reaction a few hours after injection. Hives and itching. Dr. Gomez told tech to inform pt he should only have contrast again in the future when premedicated and at a hospital. Not at an outpatient facility.      Ketoconazole      Topical cream caused swelling and itching     Dye [Contrast Dye] Other (See Comments) and Hives     Moderate flushing, CT contrast     Golimumab      Hives, bradycardia, face swelling     Neurontin [Gabapentin] Hives     Moderate hives     Nortriptyline Hives     Varicella Virus Vaccine Live      Rash     Flagyl [Metronidazole Hcl] Palpitations and Hives     Latex Rash     Metronidazole Palpitations, Other (See Comments) and Rash     dizziness (versus ciprofloxacin taken at same time)     No Clinical Screening - See Comments Rash     Nitrile gloves       Reviewed and updated as needed this visit by Provider  Tobacco  Allergies  Meds  Problems  Med Hx  Surg Hx  Fam Hx         Review of Systems   ROS COMP: Constitutional, ears as above, cardiovascular, pulmonary, skin as above, systems are negative, except as otherwise noted.         /82 (BP Location: Right arm, Patient Position: Sitting, Cuff Size: Adult Large)   Pulse 77   Temp 97.7  F (36.5  C) (Oral)   Resp 24   Ht 1.956 m (6' 5\")   Wt 145.6 kg (321 lb)   SpO2 98%   BMI 38.07 kg/m    Body mass index is 38.07 kg/m .  Physical Exam   GENERAL: healthy, alert and no distress  HENT: ear canals and TM's normal, nose and mouth without ulcers or lesions  RESP: lungs clear to auscultation - no rales, rhonchi or wheezes  CV: regular rate and rhythm, normal S1 S2, no S3 or S4, no murmur, click or rub  MS: no gross musculoskeletal defects noted  SKIN: Right medial toenail bed appears red and " "tender    Diagnostic Test Results:  Labs reviewed in Epic        Assessment & Plan     1. DDD (degenerative disc disease), lumbar  Gave refill for patient.  He is slightly early as he is only supposed to get this once a month.  Last urine screen was in August 2019 and normal.  - oxyCODONE (ROXICODONE) 5 MG tablet; Take 1-2 tablets (5-10 mg) by mouth every 6 hours as needed for pain (maximum 4 tablet(s) per day)  Dispense: 35 tablet; Refill: 0    2. Ankylosing spondylitis of sacral region (H)  As above  - oxyCODONE (ROXICODONE) 5 MG tablet; Take 1-2 tablets (5-10 mg) by mouth every 6 hours as needed for pain (maximum 4 tablet(s) per day)  Dispense: 35 tablet; Refill: 0    3. Infection of nail bed of toe of right foot  We will trial antibiotic, patient has penicillin allergy so unable to do cephalosporin.  He does follow-up with podiatry and of October  - doxycycline hyclate (VIBRAMYCIN) 100 MG capsule; Take 1 capsule (100 mg) by mouth 2 times daily for 5 days  Dispense: 10 capsule; Refill: 0     BMI:   Estimated body mass index is 38.07 kg/m  as calculated from the following:    Height as of this encounter: 1.956 m (6' 5\").    Weight as of this encounter: 145.6 kg (321 lb).   Weight management plan: Discussed healthy diet and exercise guidelines  Return in about 1 week (around 10/4/2019), or if symptoms worsen or fail to improve.    JOCELYN Pro, NP-C  [facility]    This chart was documented by provider using a voice activated software called Dragon in addition to manual typing. There may be vocabulary errors or other grammatical errors due to this.       "

## 2019-10-02 ENCOUNTER — TELEPHONE (OUTPATIENT)
Dept: PHARMACY | Facility: CLINIC | Age: 44
End: 2019-10-02

## 2019-10-02 ENCOUNTER — OFFICE VISIT (OUTPATIENT)
Dept: RHEUMATOLOGY | Facility: CLINIC | Age: 44
End: 2019-10-02
Payer: MEDICARE

## 2019-10-02 VITALS
WEIGHT: 315 LBS | OXYGEN SATURATION: 99 % | SYSTOLIC BLOOD PRESSURE: 104 MMHG | HEIGHT: 77 IN | DIASTOLIC BLOOD PRESSURE: 76 MMHG | BODY MASS INDEX: 37.19 KG/M2 | HEART RATE: 66 BPM

## 2019-10-02 DIAGNOSIS — Z79.899 HIGH RISK MEDICATIONS (NOT ANTICOAGULANTS) LONG-TERM USE: ICD-10-CM

## 2019-10-02 DIAGNOSIS — M45.8 ANKYLOSING SPONDYLITIS OF SACRAL REGION (H): Primary | ICD-10-CM

## 2019-10-02 PROCEDURE — 99213 OFFICE O/P EST LOW 20 MIN: CPT | Performed by: INTERNAL MEDICINE

## 2019-10-02 ASSESSMENT — MIFFLIN-ST. JEOR: SCORE: 2462.5

## 2019-10-02 NOTE — PROGRESS NOTES
"Rheumatology Clinic Visit      Joel Pineda MRN# [id number]   YOB: 1975 Age: 44 year old      Date of visit: 10/02/19   PCP: Dr. Ksenia Lyles    Chief Complaint   Patient presents with:  RECHECK: ankylosing spondylitis    Assessment and Plan     1.  Ankylosing spondylitis: Many years of inflammatory back pain but no clear sacroiliitis seen on x-rays or MRIs; then had a lumbar spine x-ray on 2/23/2018 at [facility] showing \"Moderate L5-S1 and mild L4-5 degenerative disc disease and degenerative facet arthropathy. No evidence for fracture, subluxation, or spondylolisthesis. Chronic bridging enthesophyte across the lower right SI joint with irregularity of the joint space suspicious for sequelae of chronic inflammatory sacroiliitis. Correlate clinically.\"  This is complicated by a history of back surgery and degenerative changes.  HLA-B27 positive per record review but the actual lab report has not been seen.  He has a personal history of diverticulitis requiring sigmoidectomy.  Reportedly his mother has ulcerative colitis. Based on his symptoms, the x-ray results, and HLA-B27 positive, it is most likely ankylosing spondylitis and Remicade was started with improvement of lower back pain and resolution of lower back stiffness. However, Remicade was also associated with increased infections so it was changed to Simponi; Simponi was associated with hives, nausea, dizziness, and drowsiness.  Then on Humira that he initially thought was associated with increased fatigue but holding it did not affect the fatigue; holding it did result in worsening inflammatory axial symptoms.  Currently only with mechanical back pain that is being evaluated in the pain management clinic and neurosurgery clinic.   - Continue Humira 40mg SQ every 14 days (receiving from the )   - No labs prior to follow-up appointment in 6 months    2. Mechanical back pain: now seeing NSGY.  Perioperative DMARD management: Humira " should not be taken at least 2 weeks prior to surgery; and restarted no sooner than 2 weeks after the surgery is completed and only in the absence of infection, absence of delayed wound healing, and only after given approval to restart by his surgeon. I explained that he would also need to discuss with his surgeon in case there were any other preferences from the surgeon.     3. BMI 38.3, obesity; reported hx of fatty liver disease; elevated LFT: encouraged weight loss and discussed the health benefit    4.  Vaccinations: Vaccinations reviewed with Mr. Pineda.  Risks and benefits of vaccinations were discussed.  - Influenza: up to date  - [id number]: up to date  - [id number]: up to date  - Shingrix: up to date    Mr. Pineda verbalized agreement with and understanding of the rational for the diagnosis and treatment plan.  All questions were answered to best of my ability and the patient's satisfaction. Mr. Pineda was advised to contact the clinic with any questions that may arise after the clinic visit.      Thank you for involving me in the care of the patient    Return to clinic: 6 months      HPI   Joel Pineda is a 44 year old male with a past medical history significant for hypertension, hyperlipidemia, asthma, history of pulmonary embolism, history of diverticulitis requiring sigmoidectomy, GERD, obstructive sleep apnea, bipolar disorder, hypothyroidism, B12 deficiency, CKD? (reportedly issue with contrast) and chronic pain syndrome who presents for follow-up of ankylosing spondylitis.    Today, Mr. Pineda reports that he is tolerating Humira well.  Morning stiffness for no more than 10 minutes.  Back pain is now only with increased activity and has some radiation down his legs; he was being seen at the pain management clinic is now following in the neurosurgery clinic.  He says that he may end up getting surgery on his back.  Back pain does not improve with increased physical activity.      Denies fevers, chills,  nausea, vomiting, constipation, diarrhea. No abdominal pain. No chest pain/pressure, palpitations, or shortness of breath. No LE swelling. No neck pain. No oral or nasal sores.  No rash. No sicca symptoms. No photosensitivity or photophobia. No eye pain or redness. No history of inflammatory eye disease.  No history of Raynaud's Phenomenon.  No seizure history.  No known renal disorder.      Mother: ulcerative colitis    Tobacco: None  EtOH: None  Drugs: None    ROS   GEN: No fevers, chills, night sweats, or weight change  SKIN: No itching, rashes, sores  HEENT: No oral or nasal ulcers.  CV: No chest pain, pressure, palpitations, or dyspnea on exertion.  PULM: No SOB, wheeze, cough.  GI: No nausea, vomiting, constipation, diarrhea. No blood in stool. No abdominal pain.  : No blood in urine.  MSK: See HPI.  NEURO: See HPI  EXT: No LE swelling  PSYCH: See HPI    Active Problem List     Patient Active Problem List   Diagnosis     Major depressive disorder, recurrent episode (H)     Intermittent asthma     Hyperlipidemia LDL goal <100     Chronic nonallergic rhinitis     Diverticulosis     GERD (gastroesophageal reflux disease)     Anxiety     LLOYD (obstructive sleep apnea)- mild (AHI 11)     Intracranial arachnoid cyst     Facet arthritis of cervical region     Acquired hypothyroidism     Bipolar 2 disorder (H)     Chronic midline low back pain without sciatica     Irritable bowel syndrome with diarrhea     B12 deficiency     Essential hypertension with goal blood pressure less than 140/90     Chronic pain syndrome     Ankylosing spondylitis of sacral region (H)     Morbid obesity due to hypertriglyceridemia (H)     Fatty infiltration of liver     DDD (degenerative disc disease), lumbar     Type 2 diabetes mellitus with complication, without long-term current use of insulin (H)     Peripheral polyneuropathy     History of pulmonary embolism     Past Medical History     Past Medical History:   Diagnosis Date     Acne       Allergic state      Anxiety      Bipolar 2 disorder (H)      Chest pain     Chest pain, regulated w/BP meds. Clear arteries.     Chronic pain      Depressive disorder      Diabetes (H)      Diverticulosis      Gastroesophageal reflux disease      Hypertension      IBS (irritable bowel syndrome)      Intracranial arachnoid cyst      Polyneuropathy      Pulmonary embolism (H)      Skin exam, screening for cancer 12/3/2013     Sleep apnea      Uncomplicated asthma      Past Surgical History     Past Surgical History:   Procedure Laterality Date     BACK SURGERY  10/07    lumbar discectomy L5-S1     COLONOSCOPY      Note: colonoscopy scheduled with [facility] on Friday, 9/4/15     COSMETIC SURGERY  2012    Nose Exterior - functional     GI SURGERY  August 2013    Sigmoidectomy     HERNIA REPAIR, UMBILICAL  8/23/11    [place], Dr. Evan Beavers     INCISION AND DRAINAGE, ABSCESS, COMPLEX  8/23/11    umbilical, Dr. Evan Beavers     LAPAROSCOPIC ASSISTED COLECTOMY LEFT (DESCENDING)  8/15/2013    Procedure: LAPAROSCOPIC ASSISTED COLECTOMY LEFT (DESCENDING);  Laparoscopic Hand Assisted Sigmoid Resection, Mobilization of Splenic Fissure, coloproctoscopy, *Latex Free Room* Anesthesia General with Pain block  ;  Surgeon: Aurora Justice MD;  Location: [facility] OR     NERVE SURGERY  8/18/11    RF ablation @ L3-S1 @ [facility]     RECONSTRUCT NOSE AND SEPTUM (FUNCTIONAL)  10/14/2011    Procedure:RECONSTRUCT NOSE AND SEPTUM (FUNCTIONAL); Functional Septorhinoplasty, Turbinate Reduction, ; Surgeon:CEDRIC CUEVAS; Location:[facility] OR     SINUS SURGERY  10/1/01    ethmoidectomy chronic sinusitis     Allergy     Allergies   Allergen Reactions     Amoxicillin-Pot Clavulanate Difficulty breathing     Banana Shortness Of Breath     Pt reports organic Banana is okay.      Nitroglycerin Palpitations     Penicillins Anaphylaxis     Provigil [Modafinil] Shortness Of Breath     headache     Gadolinium Hives and Itching     Patient was premedicated for the  contrast allergy. He did still have a reaction a few hours after injection. Hives and itching. Dr. Gomez told tech to inform pt he should only have contrast again in the future when premedicated and at a hospital. Not at an outpatient facility.      Ketoconazole      Topical cream caused swelling and itching     Dye [Contrast Dye] Other (See Comments) and Hives     Moderate flushing, CT contrast     Golimumab      Hives, bradycardia, face swelling     Neurontin [Gabapentin] Hives     Moderate hives     Nortriptyline Hives     Varicella Virus Vaccine Live      Rash     Flagyl [Metronidazole Hcl] Palpitations and Hives     Latex Rash     Metronidazole Palpitations, Other (See Comments) and Rash     dizziness (versus ciprofloxacin taken at same time)     No Clinical Screening - See Comments Rash     Nitrile gloves     Current Medication List     Current Outpatient Medications   Medication Sig     acetaminophen 500 MG CAPS Take 500 mg by mouth every 4 hours as needed     adalimumab (HUMIRA *CF*) 40 MG/0.4ML pen kit Inject 0.4 mLs (40 mg) Subcutaneous every 14 days . Hold for signs of infection, then seek medical attention.     albuterol (PROAIR HFA/PROVENTIL HFA/VENTOLIN HFA) 108 (90 Base) MCG/ACT inhaler Inhale 2 puffs into the lungs every 4 hours as needed for shortness of breath / dyspnea or wheezing     ALPRAZolam (XANAX XR) 1 MG 24 hr tablet Take 1 mg by mouth daily     aspirin (ASA) 81 MG tablet Take 81 mg by mouth daily      blood glucose monitoring (NO BRAND SPECIFIED) meter device kit Use to test blood sugar 2 times daily or as directed. Include test strips (2 boxes) with 3 refills     celecoxib (CELEBREX) 200 MG capsule TAKE 1 CAPSULE BY MOUTH TWICE DAILY AS NEEDED FOR MODERATE PAIN.     cetirizine (ZYRTEC) 10 MG tablet Take 1 tablet (10 mg) by mouth At Bedtime     cholecalciferol (VITAMIN D3) 50497 units (1250 mcg) capsule Take one capsule every two weeks.     ciprofloxacin-dexamethasone (CIPRODEX)  0.3-0.1 % otic suspension Place 5 drops into the right ear twice a week     clindamycin (CLEOCIN) 150 MG capsule Take 1 capsule (150 mg) by mouth 3 times daily     clotrimazole (LOTRIMIN) 1 % external solution Apply 1 mL topically 2 times daily Correct use is 5 drops in right ear twice weekly     cyanocobalamin (CYANOCOBALAMIN) 1000 MCG/ML injection Inject 1 mL (1,000 mcg) into the muscle every 30 days     doxycycline hyclate (VIBRAMYCIN) 100 MG capsule Take 1 capsule (100 mg) by mouth 2 times daily for 5 days     DULoxetine (CYMBALTA) 60 MG capsule Take 60 mg by mouth daily     EPINEPHrine (EPIPEN/ADRENACLICK/OR ANY BX GENERIC EQUIV) 0.3 MG/0.3ML injection 2-pack Inject 0.3 mLs (0.3 mg) into the muscle once as needed for anaphylaxis     famotidine (PEPCID) 10 MG tablet Take 10 mg by mouth Prior to administration of Humira every 2 weeks     fluticasone-salmeterol (ADVAIR DISKUS) 250-50 MCG/DOSE inhaler Inhale 1 puff into the lungs 2 times daily     Ginger, Zingiber officinalis, (GINGER ROOT) 550 MG CAPS capsule Take 550 mg by mouth Up to three times daily     hydrOXYzine (ATARAX) 50 MG tablet Take  mg by mouth as needed For anxiety and insomnia     lamoTRIgine (LAMICTAL) 100 MG tablet Take 200 mg by mouth daily     levothyroxine (SYNTHROID/LEVOTHROID) 75 MCG tablet TAKE 1 TABLET EVERY MORNING     lidocaine (XYLOCAINE) 5 % external ointment [facility]     loperamide (IMODIUM) 2 MG capsule Take 2 mg by mouth 4 times daily as needed for diarrhea     metFORMIN (GLUCOPHAGE-XR) 500 MG 24 hr tablet Take 2 tablets (1,000 mg) by mouth daily (with dinner)     metoclopramide (REGLAN) 5 MG tablet Take 1 tablet (5 mg) by mouth 3 times daily as needed (nausea)     metoprolol succinate ER (TOPROL-XL) 200 MG 24 hr tablet TAKE 2 TABLETS (400MG) DAILY     montelukast (SINGULAIR) 10 MG tablet Take 1 tablet (10 mg) by mouth every evening     omega-3 acid ethyl esters (LOVAZA) 1 g capsule TAKE 2 CAPSULES BY MOUTH TWICE DAILY.      omeprazole 20 MG tablet Take 20 mg by mouth daily      ondansetron (ZOFRAN) 8 MG tablet TAKE 1 TABLET BY MOUTH EVERY 8 HOURS AS NEEDED FOR NAUSEA OR VOMITING     order for [facility] REMSTAR 60 Series Auto CPAP 9-13 cm H2O, Wisp nasal mask w/a large cushion and a chinstrap     oxyCODONE (ROXICODONE) 5 MG tablet Take 1-2 tablets (5-10 mg) by mouth every 6 hours as needed for pain (maximum 4 tablet(s) per day)     pregabalin (LYRICA) 150 MG capsule Take 1 capsule (150 mg) by mouth 2 times daily     pseudoePHEDrine (SUDAFED) 120 MG 12 hr tablet Take 1 tablet (120 mg) by mouth every 12 hours     QUEtiapine (SEROQUEL) 50 MG tablet Take 1 tablet (50 mg) by mouth 4 times daily as needed     ramipril (ALTACE) 10 MG capsule Take 1 capsule (10 mg) by mouth daily     rizatriptan (MAXALT-MLT) 5 MG ODT tab Take 1 tablet (5 mg) by mouth at onset of headache for migraine     rosuvastatin (CRESTOR) 40 MG tablet Take 1 tablet (40 mg) by mouth daily     syringe, disposable, (BD TUBERCULIN SYRINGE) 1 ML MISC Equipment being ordered: 1 ml tuberculin syringes to be used for Vitamin B12 injections.     vitamin C (ASCORBIC ACID) 500 MG tablet Take 500 mg by mouth daily     No current facility-administered medications for this visit.          Social History   See HPI    Family History     Family History   Problem Relation Age of Onset     Musculoskeletal Disorder Mother         back     Anxiety Disorder Mother      Colon Polyps Mother      Ulcerative Colitis Mother         and ischemic small intestine, surgery     Hypertension Mother      Breast Cancer Mother      Osteoporosis Mother      Diabetes Mother         Type 2, Diagnosed in 2014     Depression Mother         Takes Cymbalta to help with chronic pain + depx     Thyroid Disease Mother         Hypothyroidism     Obesity Mother         Under much better control latter half of 2015     Musculoskeletal Disorder Father         back     Substance Abuse Father      Hypertension Father   "    Hyperlipidemia Father      Depression Father         Off meds for many years. Seems \"ok\"     Heart Disease Maternal Grandmother      Heart Disease Maternal Grandfather      Psychotic Disorder Paternal Grandfather      Suicide Paternal Grandfather      Depression Paternal Grandfather         Pediatrician. Committed suicide by pistol in 1990.     Musculoskeletal Disorder Brother         back     Depression Brother         Expressed as anger and moodiness     Substance Abuse Sister      Depression Sister         Mental Health Therapist, yet no anti-depressants?     Anxiety Disorder Sister         Mental Health Therapist, yet no anti-anxiety meds?     Other Cancer Other         Bladder Cancer - Fatal     Substance Abuse Brother      Colon Cancer No family hx of      Crohn's Disease No family hx of      Anesthesia Reaction No family hx of      Cancer No family hx of         No family history of skin cancer     Physical Exam     Temp Readings from Last 3 Encounters:   09/27/19 97.7  F (36.5  C) (Oral)   09/23/19 98.2  F (36.8  C) (Oral)   08/12/19 99.2  F (37.3  C) (Oral)     BP Readings from Last 5 Encounters:   10/02/19 104/76   09/27/19 118/82   09/23/19 130/77   08/12/19 128/85   08/08/19 120/85     Pulse Readings from Last 1 Encounters:   10/02/19 66     Resp Readings from Last 1 Encounters:   09/27/19 24     Estimated body mass index is 38.3 kg/m  as calculated from the following:    Height as of this encounter: 1.956 m (6' 5\").    Weight as of this encounter: 146.5 kg (323 lb).    GEN: NAD   HEENT: MMM. No oral lesions. Anicteric, noninjected sclera  CV: S1, S2. RRR. No m/r/g.  PULM: CTA bilaterally. No w/c.  MSK: MCPs, PIPs, DIPs, wrists, elbows, shoulders, knees, ankles, and MTPs without swelling or tenderness to palpation.  Hips nontender to direct palpation.  No dactylitis.  Achilles tendons nontender to palpation.    NEURO: UE and LE strengths 5/5 and equal bilaterally.   SKIN: No rash.  No nail " pitting.  EXT: No LE edema  PSYCH: Alert. Appropriate.     Labs / Imaging (select studies)   RF/CCP  Recent Labs   Lab Test 08/07/15  0846 09/03/14  1232   RHF <20 <20     CBC  Recent Labs   Lab Test 09/26/19  1010 08/08/19 1658 07/21/19 1954   WBC 7.7 13.6* 9.6   RBC 5.15 5.72 5.37   HGB 15.2 16.3 15.1   HCT 46.2 50.0 46.7   MCV 90 87 87   RDW 13.8 13.2 13.1    316 272   MCH 29.5 28.5 28.1   MCHC 32.9 32.6 32.3   NEUTROPHIL 39.7 54.0 41.6   LYMPH 44.9 34.8 45.6   MONOCYTE 12.2 8.6 9.5   EOSINOPHIL 2.3 1.3 2.2   BASOPHIL 0.9 0.9 0.8   ANEU 3.1 7.3 4.0   ALYM 3.5 4.7 4.4   KATIE 0.9 1.2 0.9   AEOS 0.2 0.2 0.2   ABAS 0.1 0.1 0.1     CMP  Recent Labs   Lab Test 09/26/19  1010 08/08/19  1658 07/21/19 1954 07/01/19  1314   NA  --  138 142 138   POTASSIUM  --  4.5 4.3 4.3   CHLORIDE  --  106 109 106   CO2  --  22 28 25   ANIONGAP  --  10 5 7   GLC  --  95 100* 104*   BUN  --  13 9 11   CR 0.83 0.88 0.84 0.90   GFRESTIMATED >90 >90 >90 >90   GFRESTBLACK >90 >90 >90 >90   ALYCE  --  9.8 9.1 9.7   BILITOTAL 0.4  --  0.6 0.5   ALBUMIN 4.3  --  4.2 4.4   PROTTOTAL 7.7  --  7.5 7.8   ALKPHOS 107  --  102 100   AST 48*  --  45 41   ALT 53  --  49 44     Calcium/VitaminD  Recent Labs   Lab Test 09/13/19  0943 08/08/19  1658 07/21/19 1954 07/01/19  1314  01/03/19  0844  05/03/17  1456  09/26/11  0822   ALYCE  --  9.8 9.1 9.7   < > 9.6   < > 9.5   < > 9.4   D3VIT  --   --   --   --   --   --   --   --   --  38   VITDT 40  --   --   --   --  38  --  30   < >  --     < > = values in this interval not displayed.     ESR/CRP  Recent Labs   Lab Test 09/26/19  1010 04/01/19  1540 02/13/16  1510   SED 4 4 6   CRP <2.9 <2.9 3.4     Hepatitis B  Recent Labs   Lab Test 02/28/18  1157 01/06/15  1028   HBCAB Nonreactive  --    HEPBANG Nonreactive Nonreactive     Hepatitis C  Recent Labs   Lab Test 02/28/18  1157 01/06/15  1028   HCVAB Nonreactive Nonreactive   Assay performance characteristics have not been established for newborns,    infants, and children       Lyme ab screening  Recent Labs   Lab Test 02/22/19  1457   LYMEGM 0.02     Tuberculosis Screening  Recent Labs   Lab Test 02/28/18  1157 01/06/15  1028   TBRSLT Negative Negative   TBAGN 0.00 0.00     Immunization History     Immunization History   Administered Date(s) Administered     FLU 6-35 months 01/03/2019     Influenza (IIV3) PF 10/15/2008, 08/21/2012, 09/09/2013     Influenza Vaccine IM > 6 months Valent IIV4 10/02/2015, 10/11/2016, 09/07/2018, 09/12/2019     Pneumo Conj 13-V (2010&after) 10/02/2015     Pneumococcal 23 valent 10/11/2016     TD (ADULT, 7+) 10/04/2002     TDAP Vaccine (Adacel) 07/16/2010     Zoster vaccine recombinant adjuvanted (SHINGRIX) 05/29/2019, 09/12/2019          Chart documentation done in part with Dragon Voice recognition Software. Although reviewed after completion, some word and grammatical error may remain.    Oneil Baxter MD

## 2019-10-02 NOTE — NURSING NOTE
RAPID3 (0-30) Cumulative Score  13.3          RAPID3 Weighted Score (divide #4 by 3 and that is the weighted score)  4.4

## 2019-10-07 NOTE — PROGRESS NOTES
SUBJECTIVE/OBJECTIVE:                           Joel Pineda is a 44 year old male called for a follow visit for Medication Therapy Management.  He was referred to me from Ksenia Lyles.     Chief Complaint: Follow up from 7/23/19.   Patient is looking to the future and looking at taking  classes.    Allergies/ADRs: Reviewed in Epic  Tobacco: No tobacco use  Alcohol: none  Caffeine: not assessed today  Activity: Not assessed today  PMH: Reviewed in Epic    Ankylosing Spondylitis/Pain: Current medications include Humira 40mg every 2 weeks. patient reports this is going well-no increase in infections. Patient is still having back and hip pain that is more mechanical in nature.  Patient sees neurosurgery on 10/31. Patient is wondering about pain medication (naltrexone). He is wondering if this is effective and useful. Patient reports ADLs are becoming difficult with the pain. Patient is using TENS unit, ice and heat which are somewhat effective. Patient feels he is becoming tolerant to oxycodone. He will take seroquel when he is in pain to help him sleep to avoid taking oxycodone.    Headaches: patient has not had as many headaches since starting regular massage and using a mouth guard for TMJ. Patient is not using rizatripatan since implementing these modalities.    Mood/Anxiety: current therapy includes duloxetine 60mg daily, lamictal 200mg daily, Xanax XR 1mg every day, quetiapine 25mg as needed-patient is taking about 3 times a week. Patient was taking quetiapine 100mg but it was causing excessive drowsiness and then he tried 50mg and it was better but was still not able to think clearly so his dose was decreased it to 25mg.  Patient is having some hyperhydrosis. It is not everyday. He thinks it is his duloxetine. He wanted to decrease duloxetine to 30mg but his psychiatrist declined to change his dose because he feels it is working for his mood disorder. Patient is wrapping up his year long of  DBT and will be continuing PE program. Patient sees his therapist every week. Patient is seeing a new psychiatrist, Dr. Alegria with [facility] in [place] and has been following with him once a month.       Hyperlipidemia: Current therapy includes rosuvastatin 40mg once daily, Lovaza 2gm in once daily. Pt reports no significant myalgias or other side effects. Patient's triglycerides remain elevated despite Lovaza.    ASSESSMENT:                             Current medications were reviewed today.     Medication Adherence: no issues identified    Ankylosing Spondylitis/Pain: Stable. Patient following with neurosurgery at the end of the month. Will research naltrexone therapy for pain and provide information to patient.    Headaches: Stable.    Mood/Anxiety: Stable.  Patient in close follow-up with psychiatry.    Hyperlipidemia: Needs improvement. Patient has previously been on fenofibrate 145mg daily and it was discontinued in 2018 due to declining kidney function. Once stopped kidney function improved. Before starting Lovaza 5/18/18, patient's trigs were 998. Crestor 40mg started 12/29/18. Patient would benefit from weight loss     PLAN:                            Recommend lifestyle modifications.    I spent 30 minutes with this patient today. All changes were made via collaborative practice agreement with Ksenia Lyles. A copy of the visit note was provided to the patient's primary care provider.    Will follow up in 8 weeks.    The patient was sent via [employer] a summary of these recommendations as an after visit summary.     Radha Mcdonald, Pharm.D, BCACP  Medication Therapy Management Pharmacist

## 2019-10-08 ENCOUNTER — OFFICE VISIT (OUTPATIENT)
Dept: FAMILY MEDICINE | Facility: CLINIC | Age: 44
End: 2019-10-08
Payer: MEDICARE

## 2019-10-08 ENCOUNTER — ALLIED HEALTH/NURSE VISIT (OUTPATIENT)
Dept: PHARMACY | Facility: CLINIC | Age: 44
End: 2019-10-08
Payer: COMMERCIAL

## 2019-10-08 VITALS
HEIGHT: 77 IN | OXYGEN SATURATION: 97 % | WEIGHT: 231.6 LBS | HEART RATE: 77 BPM | RESPIRATION RATE: 22 BRPM | SYSTOLIC BLOOD PRESSURE: 129 MMHG | TEMPERATURE: 98.2 F | BODY MASS INDEX: 27.35 KG/M2 | DIASTOLIC BLOOD PRESSURE: 75 MMHG

## 2019-10-08 DIAGNOSIS — G43.919 INTRACTABLE MIGRAINE WITHOUT STATUS MIGRAINOSUS, UNSPECIFIED MIGRAINE TYPE: ICD-10-CM

## 2019-10-08 DIAGNOSIS — M62.81 GENERALIZED MUSCLE WEAKNESS: ICD-10-CM

## 2019-10-08 DIAGNOSIS — M45.8 ANKYLOSING SPONDYLITIS OF SACRAL REGION (H): ICD-10-CM

## 2019-10-08 DIAGNOSIS — E78.5 HYPERLIPIDEMIA LDL GOAL <100: ICD-10-CM

## 2019-10-08 DIAGNOSIS — G89.4 CHRONIC PAIN SYNDROME: Primary | ICD-10-CM

## 2019-10-08 DIAGNOSIS — F41.8 DEPRESSION WITH ANXIETY: ICD-10-CM

## 2019-10-08 DIAGNOSIS — M25.551 HIP PAIN, RIGHT: ICD-10-CM

## 2019-10-08 DIAGNOSIS — M45.8 ANKYLOSING SPONDYLITIS OF SACRAL REGION (H): Primary | ICD-10-CM

## 2019-10-08 PROCEDURE — 99213 OFFICE O/P EST LOW 20 MIN: CPT | Performed by: NURSE PRACTITIONER

## 2019-10-08 PROCEDURE — 99606 MTMS BY PHARM EST 15 MIN: CPT | Performed by: PHARMACIST

## 2019-10-08 PROCEDURE — 99607 MTMS BY PHARM ADDL 15 MIN: CPT | Performed by: PHARMACIST

## 2019-10-08 RX ORDER — QUETIAPINE FUMARATE 25 MG/1
100 TABLET, FILM COATED ORAL AT BEDTIME
COMMUNITY
End: 2021-04-19

## 2019-10-08 ASSESSMENT — MIFFLIN-ST. JEOR: SCORE: 2047.91

## 2019-10-08 NOTE — PROGRESS NOTES
Subjective     Joel Pineda is a 44 year old male who presents to clinic today for the following health issues:    HPI   Would like a referral for pain      How many servings of fruits and vegetables do you eat daily?  0-1    On average, how many sweetened beverages do you drink each day (soda, juice, sweet tea, etc)?   1    How many days per week do you miss taking your medication? 0    Having trouble wiping himself after bowls, trouble to twist.    has tens unit, lido patches not helping his low back pain or his ability to ADLs    Will see Neurosurgeon Oct 31st   Right hip increased pain. fusion is in lower S-1, and right hip is causing him to wake up and get out of bed. Maybe related to AK.       Patient Active Problem List   Diagnosis     Major depressive disorder, recurrent episode (H)     Intermittent asthma     Hyperlipidemia LDL goal <100     Chronic nonallergic rhinitis     Diverticulosis     GERD (gastroesophageal reflux disease)     Anxiety     LLOYD (obstructive sleep apnea)- mild (AHI 11)     Intracranial arachnoid cyst     Facet arthritis of cervical region     Acquired hypothyroidism     Bipolar 2 disorder (H)     Chronic midline low back pain without sciatica     Irritable bowel syndrome with diarrhea     B12 deficiency     Essential hypertension with goal blood pressure less than 140/90     Chronic pain syndrome     Ankylosing spondylitis of sacral region (H)     Morbid obesity due to hypertriglyceridemia (H)     Fatty infiltration of liver     DDD (degenerative disc disease), lumbar     Type 2 diabetes mellitus with complication, without long-term current use of insulin (H)     Peripheral polyneuropathy     History of pulmonary embolism     Past Surgical History:   Procedure Laterality Date     BACK SURGERY  10/07    lumbar discectomy L5-S1     COLONOSCOPY      Note: colonoscopy scheduled with [facility] on Friday, 9/4/15     COSMETIC SURGERY  2012    Nose Exterior - functional     GI SURGERY  August 2013  "   Sigmoidectomy     HERNIA REPAIR, UMBILICAL  8/23/11    Dr. Evan whiting     INCISION AND DRAINAGE, ABSCESS, COMPLEX  8/23/11    umbilical, Dr. Evan Beavers     LAPAROSCOPIC ASSISTED COLECTOMY LEFT (DESCENDING)  8/15/2013    Procedure: LAPAROSCOPIC ASSISTED COLECTOMY LEFT (DESCENDING);  Laparoscopic Hand Assisted Sigmoid Resection, Mobilization of Splenic Fissure, coloproctoscopy, *Latex Free Room* Anesthesia General with Pain block  ;  Surgeon: Aurora Justice MD;  Location: [facility] OR     NERVE SURGERY  8/18/11    RF ablation @ L3-S1 @ [facility]     RECONSTRUCT NOSE AND SEPTUM (FUNCTIONAL)  10/14/2011    Procedure:RECONSTRUCT NOSE AND SEPTUM (FUNCTIONAL); Functional Septorhinoplasty, Turbinate Reduction, ; Surgeon:CEDRIC CUEVAS; Location:[facility] OR     SINUS SURGERY  10/1/01    ethmoidectomy chronic sinusitis       Social History     Tobacco Use     Smoking status: Never Smoker     Smokeless tobacco: Never Used   Substance Use Topics     Alcohol use: Yes     Alcohol/week: 0.0 standard drinks     Drinks per session: 1 or 2     Binge frequency: Weekly     Comment: occ 1/week     Family History   Problem Relation Age of Onset     Musculoskeletal Disorder Mother         back     Anxiety Disorder Mother      Colon Polyps Mother      Ulcerative Colitis Mother         and ischemic small intestine, surgery     Hypertension Mother      Breast Cancer Mother      Osteoporosis Mother      Diabetes Mother         Type 2, Diagnosed in 2014     Depression Mother         Takes Cymbalta to help with chronic pain + depx     Thyroid Disease Mother         Hypothyroidism     Obesity Mother         Under much better control latter half of 2015     Musculoskeletal Disorder Father         back     Substance Abuse Father      Hypertension Father      Hyperlipidemia Father      Depression Father         Off meds for many years. Seems \"ok\"     Heart Disease Maternal Grandmother      Heart Disease Maternal Grandfather      Psychotic " Disorder Paternal Grandfather      Suicide Paternal Grandfather      Depression Paternal Grandfather         Pediatrician. Committed suicide by pistol in 1990.     Musculoskeletal Disorder Brother         back     Depression Brother         Expressed as anger and moodiness     Substance Abuse Sister      Depression Sister         Mental Health Therapist, yet no anti-depressants?     Anxiety Disorder Sister         Mental Health Therapist, yet no anti-anxiety meds?     Other Cancer Other         Bladder Cancer - Fatal     Substance Abuse Brother      Colon Cancer No family hx of      Crohn's Disease No family hx of      Anesthesia Reaction No family hx of      Cancer No family hx of         No family history of skin cancer         Current Outpatient Medications   Medication Sig Dispense Refill     acetaminophen 500 MG CAPS Take 500 mg by mouth every 4 hours as needed 60 capsule      adalimumab (HUMIRA *CF*) 40 MG/0.4ML pen kit Inject 0.4 mLs (40 mg) Subcutaneous every 14 days . Hold for signs of infection, then seek medical attention. 2.4 mL 3     albuterol (PROAIR HFA/PROVENTIL HFA/VENTOLIN HFA) 108 (90 Base) MCG/ACT inhaler Inhale 2 puffs into the lungs every 4 hours as needed for shortness of breath / dyspnea or wheezing 8.5 g 11     ALPRAZolam (XANAX XR) 1 MG 24 hr tablet Take 1 mg by mouth daily       aspirin (ASA) 81 MG tablet Take 81 mg by mouth daily        blood glucose monitoring (NO BRAND SPECIFIED) meter device kit Use to test blood sugar 2 times daily or as directed. Include test strips (2 boxes) with 3 refills 1 kit 0     celecoxib (CELEBREX) 200 MG capsule TAKE 1 CAPSULE BY MOUTH TWICE DAILY AS NEEDED FOR MODERATE PAIN. 180 capsule 1     cetirizine (ZYRTEC) 10 MG tablet Take 1 tablet (10 mg) by mouth At Bedtime 30 tablet 11     cholecalciferol (VITAMIN D3) 14690 units (1250 mcg) capsule Take one capsule every two weeks. 24 capsule 1     cyanocobalamin (CYANOCOBALAMIN) 1000 MCG/ML injection Inject 1 mL  (1,000 mcg) into the muscle every 30 days 10 mL 0     DULoxetine (CYMBALTA) 60 MG capsule Take 60 mg by mouth daily       EPINEPHrine (EPIPEN/ADRENACLICK/OR ANY BX GENERIC EQUIV) 0.3 MG/0.3ML injection 2-pack Inject 0.3 mLs (0.3 mg) into the muscle once as needed for anaphylaxis 0.6 mL 3     famotidine (PEPCID) 10 MG tablet Take 10 mg by mouth Prior to administration of Humira every 2 weeks       fluticasone-salmeterol (ADVAIR DISKUS) 250-50 MCG/DOSE inhaler Inhale 1 puff into the lungs 2 times daily 1 Inhaler 11     Ginger, Zingiber officinalis, (GINGER ROOT) 550 MG CAPS capsule Take 550 mg by mouth Up to three times daily       hydrOXYzine (ATARAX) 50 MG tablet Take 50 mg by mouth 2 times daily For anxiety and insomnia       lamoTRIgine (LAMICTAL) 100 MG tablet Take 200 mg by mouth daily       levothyroxine (SYNTHROID/LEVOTHROID) 75 MCG tablet TAKE 1 TABLET EVERY MORNING 90 tablet 1     lidocaine (XYLOCAINE) 5 % external ointment UAD 30 g      loperamide (IMODIUM) 2 MG capsule Take 2 mg by mouth 4 times daily as needed for diarrhea       metFORMIN (GLUCOPHAGE-XR) 500 MG 24 hr tablet Take 2 tablets (1,000 mg) by mouth daily (with dinner) 180 tablet 3     metoclopramide (REGLAN) 5 MG tablet Take 1 tablet (5 mg) by mouth 3 times daily as needed (nausea) 20 tablet 3     metoprolol succinate ER (TOPROL-XL) 200 MG 24 hr tablet TAKE 2 TABLETS (400MG) DAILY (Patient taking differently: Take 200 mg by mouth 2 times daily ) 180 tablet 0     montelukast (SINGULAIR) 10 MG tablet Take 1 tablet (10 mg) by mouth every evening 90 tablet 1     omega-3 acid ethyl esters (LOVAZA) 1 g capsule TAKE 2 CAPSULES BY MOUTH TWICE DAILY. 360 capsule 2     omeprazole 20 MG tablet Take 20 mg by mouth daily        ondansetron (ZOFRAN) 8 MG tablet TAKE 1 TABLET BY MOUTH EVERY 8 HOURS AS NEEDED FOR NAUSEA OR VOMITING 20 tablet 4     [place] for [facility] REMSTAR 60 Series Auto CPAP 9-13 cm H2O, Wisp nasal mask w/a large cushion and a  chinstrap       oxyCODONE (ROXICODONE) 5 MG tablet Take 1-2 tablets (5-10 mg) by mouth every 6 hours as needed for pain (maximum 4 tablet(s) per day) 35 tablet 0     pregabalin (LYRICA) 150 MG capsule Take 1 capsule (150 mg) by mouth 2 times daily 60 capsule 5     pseudoePHEDrine (SUDAFED) 120 MG 12 hr tablet Take 1 tablet (120 mg) by mouth every 12 hours 28 tablet 0     QUEtiapine (SEROQUEL) 25 MG tablet Take 25 mg by mouth 4 times daily as needed       ramipril (ALTACE) 10 MG capsule Take 1 capsule (10 mg) by mouth daily 90 capsule 1     rizatriptan (MAXALT-MLT) 5 MG ODT tab Take 1 tablet (5 mg) by mouth at onset of headache for migraine 30 tablet 5     rosuvastatin (CRESTOR) 40 MG tablet Take 1 tablet (40 mg) by mouth daily 90 tablet 2     syringe, disposable, (BD TUBERCULIN SYRINGE) 1 ML MISC Equipment being ordered: 1 ml tuberculin syringes to be used for Vitamin B12 injections. 12 each 1     vitamin C (ASCORBIC ACID) 500 MG tablet Take 500 mg by mouth daily       Allergies   Allergen Reactions     Amoxicillin-Pot Clavulanate Difficulty breathing     Banana Shortness Of Breath     Pt reports organic Banana is okay.      Nitroglycerin Palpitations     Penicillins Anaphylaxis     Provigil [Modafinil] Shortness Of Breath     headache     Gadolinium Hives and Itching     Patient was premedicated for the contrast allergy. He did still have a reaction a few hours after injection. Hives and itching. Dr. Gomez told tech to inform pt he should only have contrast again in the future when premedicated and at a hospital. Not at an outpatient facility.      Ketoconazole      Topical cream caused swelling and itching     Dye [Contrast Dye] Other (See Comments) and Hives     Moderate flushing, CT contrast     Golimumab      Hives, bradycardia, face swelling     Neurontin [Gabapentin] Hives     Moderate hives     Nortriptyline Hives     Varicella Virus Vaccine Live      Rash     Flagyl [Metronidazole Hcl] Palpitations  "and Hives     Latex Rash     Metronidazole Palpitations, Other (See Comments) and Rash     dizziness (versus ciprofloxacin taken at same time)     No Clinical Screening - See Comments Rash     Nitrile gloves         Reviewed and updated as needed this visit by Provider  Tobacco  Allergies  Meds  Problems  Med Hx  Surg Hx  Fam Hx         Review of Systems   ROS COMP: CV, resp      Objective    /75 (BP Location: Right arm, Patient Position: Sitting, Cuff Size: Adult Large)   Pulse 77   Temp 98.2  F (36.8  C) (Oral)   Resp 22   Ht 1.956 m (6' 5\")   Wt 105.1 kg (231 lb 9.6 oz)   SpO2 97%   BMI 27.46 kg/m    Body mass index is 27.46 kg/m .  Physical Exam   GENERAL: healthy, alert and no distress  RESP: lungs clear to auscultation - no rales, rhonchi or wheezes  CV: regular rate and rhythm, normal S1 S2, no S3 or S4, no murmur, click or rub    Diagnostic Test Results:  Labs reviewed in Epic        Assessment & Plan     1. Ankylosing spondylitis of sacral region (H)  Start PT for weakness  - PHYSICAL THERAPY REFERRAL; Future    2. Generalized muscle weakness  As above  - PHYSICAL THERAPY REFERRAL; Future    3. Hip pain, right  As above  - PHYSICAL THERAPY REFERRAL; Future     BMI:   Estimated body mass index is 27.46 kg/m  as calculated from the following:    Height as of this encounter: 1.956 m (6' 5\").    Weight as of this encounter: 105.1 kg (231 lb 9.6 oz).     Return in about 4 weeks (around 11/5/2019), or if symptoms worsen or fail to improve.    JOCELYN Pro, NP-C  [facility]          "

## 2019-10-09 ENCOUNTER — MYC MEDICAL ADVICE (OUTPATIENT)
Dept: FAMILY MEDICINE | Facility: CLINIC | Age: 44
End: 2019-10-09

## 2019-10-09 DIAGNOSIS — M62.81 GENERALIZED MUSCLE WEAKNESS: ICD-10-CM

## 2019-10-09 DIAGNOSIS — M25.551 HIP PAIN, RIGHT: Primary | ICD-10-CM

## 2019-10-09 DIAGNOSIS — M45.8 ANKYLOSING SPONDYLITIS OF SACRAL REGION (H): ICD-10-CM

## 2019-10-09 RX ORDER — LANOLIN ALCOHOL/MO/W.PET/CERES
1 CREAM (GRAM) TOPICAL AT BEDTIME
COMMUNITY
End: 2020-04-07

## 2019-10-09 NOTE — PATIENT INSTRUCTIONS
Recommendations from today's MTM visit:                                                      Will research naltrexone therapy and send you information.    It was great to speak with you today.  I value your experience and would be very thankful for your time with providing feedback on our clinic survey. You may receive a survey via email or text message in the next few days.     Next MTM visit: 8 weeks    To schedule another MTM appointment, please call the clinic directly or you may call the MTM scheduling line at [phone] or toll-free at [phone].     My Clinical Pharmacist's contact information:                                                      It was a pleasure talking with you today!  Please feel free to contact me with any questions or concerns you have.      Radha Mcdonald, Pharm.D, BCACP  Medication Therapy Management Pharmacist

## 2019-10-09 NOTE — TELEPHONE ENCOUNTER
New order placed. Please fax per patient number below.  Jing Priest, JOCELYN, NP-C  [facility]

## 2019-10-14 DIAGNOSIS — E03.9 ACQUIRED HYPOTHYROIDISM: ICD-10-CM

## 2019-10-14 NOTE — TELEPHONE ENCOUNTER
"Requested Prescriptions   Pending Prescriptions Disp Refills     levothyroxine (SYNTHROID/LEVOTHROID) 75 MCG tablet [Pharmacy Med Name: LEVOTHYROXIN TAB 0.075MG] 90 tablet 1     Sig: TAKE 1 TABLET EVERY MORNING       Thyroid Protocol Passed - 10/14/2019  8:04 AM        Passed - Patient is 12 years or older        Passed - Recent (12 mo) or future (30 days) visit within the authorizing provider's specialty     Patient has had an office visit with the authorizing provider or a provider within the authorizing providers department within the previous 12 mos or has a future within next 30 days. See \"Patient Info\" tab in inbasket, or \"Choose Columns\" in Meds & Orders section of the refill encounter.              Passed - Medication is active on med list        Passed - Normal TSH on file in past 12 months     Recent Labs   Lab Test 12/24/18  1114   TSH 2.63              levothyroxine (SYNTHROID/LEVOTHROID) 75 MCG tablet  Last Written Prescription Date:  4/16/19  Last Fill Quantity: 90,  # refills: 1   Last office visit: 10/8/2019 with prescribing provider:  Dr. Lyles   Future Office Visit:   Next 5 appointments (look out 90 days)    Dec 12, 2019 10:00 AM CST  Return Visit with Elaine Segovia MD  [facility] ([facility] [address] [address] [place] [id number] [phone]           "

## 2019-10-16 PROBLEM — L60.0 INGROWN TOENAIL: Status: ACTIVE | Noted: 2019-07-03

## 2019-10-16 RX ORDER — LEVOTHYROXINE SODIUM 75 UG/1
TABLET ORAL
Qty: 90 TABLET | Refills: 1 | OUTPATIENT
Start: 2019-10-16

## 2019-10-16 NOTE — TELEPHONE ENCOUNTER
Duplicate request. Refilled 10/14/19, #90 with 1 refill. See chart for details.    Dianne Henderson RN

## 2019-10-17 ASSESSMENT — ENCOUNTER SYMPTOMS
NAIL CHANGES: 1
MYALGIAS: 1
BOWEL INCONTINENCE: 0
FEVER: 0
NERVOUS/ANXIOUS: 1
INCREASED ENERGY: 1
BLOATING: 0
CHILLS: 0
LOSS OF CONSCIOUSNESS: 0
NECK PAIN: 1
TINGLING: 0
BLOOD IN STOOL: 0
NUMBNESS: 1
CONSTIPATION: 0
SMELL DISTURBANCE: 0
FATIGUE: 1
ARTHRALGIAS: 1
VOMITING: 0
WEIGHT LOSS: 0
POOR WOUND HEALING: 0
MEMORY LOSS: 1
PARALYSIS: 0
DEPRESSION: 1
DECREASED APPETITE: 0
DIARRHEA: 1
SINUS CONGESTION: 1
RECTAL PAIN: 0
SPEECH CHANGE: 0
HOARSE VOICE: 0
TROUBLE SWALLOWING: 0
NIGHT SWEATS: 0
PANIC: 0
DIZZINESS: 0
JAUNDICE: 0
DISTURBANCES IN COORDINATION: 0
POLYPHAGIA: 0
SEIZURES: 0
NAUSEA: 1
STIFFNESS: 1
WEAKNESS: 0
MUSCLE WEAKNESS: 1
SKIN CHANGES: 0
HALLUCINATIONS: 0
ALTERED TEMPERATURE REGULATION: 1
JOINT SWELLING: 0
WEIGHT GAIN: 0
DECREASED CONCENTRATION: 1
HEARTBURN: 0
TASTE DISTURBANCE: 0
INSOMNIA: 0
BACK PAIN: 1
POLYDIPSIA: 0
SORE THROAT: 0
HEADACHES: 1
NECK MASS: 0
SINUS PAIN: 0
MUSCLE CRAMPS: 0
ABDOMINAL PAIN: 0
TREMORS: 0

## 2019-10-18 ENCOUNTER — TELEPHONE (OUTPATIENT)
Dept: RHEUMATOLOGY | Facility: CLINIC | Age: 44
End: 2019-10-18

## 2019-10-18 NOTE — TELEPHONE ENCOUNTER
Free Drug Application Approved  Effective Dates - UNTIL 12/2020  Patient notified? LVM  Additional Information  - see letter

## 2019-10-22 ENCOUNTER — TRANSFERRED RECORDS (OUTPATIENT)
Dept: HEALTH INFORMATION MANAGEMENT | Facility: CLINIC | Age: 44
End: 2019-10-22

## 2019-10-23 ENCOUNTER — OFFICE VISIT (OUTPATIENT)
Dept: PODIATRY | Facility: CLINIC | Age: 44
End: 2019-10-23
Payer: MEDICARE

## 2019-10-23 VITALS
HEART RATE: 74 BPM | SYSTOLIC BLOOD PRESSURE: 118 MMHG | DIASTOLIC BLOOD PRESSURE: 78 MMHG | WEIGHT: 231 LBS | BODY MASS INDEX: 27.39 KG/M2

## 2019-10-23 DIAGNOSIS — L60.0 INGROWN TOENAIL: Primary | ICD-10-CM

## 2019-10-23 PROCEDURE — 11730 AVULSION NAIL PLATE SIMPLE 1: CPT | Mod: T5 | Performed by: PODIATRIST

## 2019-10-23 PROCEDURE — 99203 OFFICE O/P NEW LOW 30 MIN: CPT | Mod: 25 | Performed by: PODIATRIST

## 2019-10-23 RX ORDER — MUPIROCIN 20 MG/G
OINTMENT TOPICAL
Refills: 1 | COMMUNITY
Start: 2019-09-23 | End: 2019-11-06

## 2019-10-23 NOTE — LETTER
10/23/2019         RE: Joel Pineda  [id number] [facility] [place] [address]        Dear Colleague,    Thank you for referring your patient, Joel Pineda, to the [facility]. Please see a copy of my visit note below.    Subjective:    Pt is seen today in consult form Dr. Nini Webber w/ the c/c of a painful ingrown right great nail medial border.  This has been problematic for 1 month(s).  negativehistory of drainage from the site. This is slowly getting worse.  Aggravated by activity and relieved by rest.  Has tried soaking which has not helped.   denies history of trauma to the area.  Denies fever and chill, denies numbness and tingling, denies erythema on dorsum of foot.   Patient has diabetes with numbness in his feet.  He has a history of pulmonary embolism.      ROS:  A 10-point review of systems was performed and is positive for that noted in the HPI and as seen below.  All other areas are negative.     Past Medical History:   Diagnosis Date     Acne      Allergic state      Anxiety      Bipolar 2 disorder (H)      Chest pain     Chest pain, regulated w/BP meds. Clear arteries.     Chronic pain      Depressive disorder      Diabetes (H)      Diverticulosis      Gastroesophageal reflux disease      Hypertension      IBS (irritable bowel syndrome)      Intracranial arachnoid cyst      Polyneuropathy      Pulmonary embolism (H)      Skin exam, screening for cancer 12/3/2013     Sleep apnea      Uncomplicated asthma        Past Surgical History:   Procedure Laterality Date     BACK SURGERY  10/07    lumbar discectomy L5-S1     COLONOSCOPY      Note: colonoscopy scheduled with [facility] on Friday, 9/4/15     COSMETIC SURGERY  2012    Nose Exterior - functional     GI SURGERY  August 2013    Sigmoidectomy     HERNIA REPAIR, UMBILICAL  8/23/11    Dr. Evan whiting     INCISION AND DRAINAGE, ABSCESS, COMPLEX  8/23/11    umbilical, Dr. Evan Beavers     LAPAROSCOPIC ASSISTED COLECTOMY  "LEFT (DESCENDING)  8/15/2013    Procedure: LAPAROSCOPIC ASSISTED COLECTOMY LEFT (DESCENDING);  Laparoscopic Hand Assisted Sigmoid Resection, Mobilization of Splenic Fissure, coloproctoscopy, *Latex Free Room* Anesthesia General with Pain block  ;  Surgeon: Aurora Justice MD;  Location: [facility] OR     NERVE SURGERY  8/18/11    RF ablation @ L3-S1 @ [facility]     RECONSTRUCT NOSE AND SEPTUM (FUNCTIONAL)  10/14/2011    Procedure:RECONSTRUCT NOSE AND SEPTUM (FUNCTIONAL); Functional Septorhinoplasty, Turbinate Reduction, ; Surgeon:CEDRIC CUEVAS; Location:[facility] OR     SINUS SURGERY  10/1/01    ethmoidectomy chronic sinusitis       Family History   Problem Relation Age of Onset     Musculoskeletal Disorder Mother         back     Anxiety Disorder Mother      Colon Polyps Mother      Ulcerative Colitis Mother         and ischemic small intestine, surgery     Hypertension Mother      Breast Cancer Mother      Osteoporosis Mother      Diabetes Mother         Type 2, Diagnosed in 2014     Depression Mother         Takes Cymbalta to help with chronic pain + depx     Thyroid Disease Mother         Hypothyroidism     Obesity Mother         Under much better control latter half of 2015     Musculoskeletal Disorder Father         back     Substance Abuse Father      Hypertension Father      Hyperlipidemia Father      Depression Father         Off meds for many years. Seems \"ok\"     Heart Disease Maternal Grandmother      Heart Disease Maternal Grandfather      Psychotic Disorder Paternal Grandfather      Suicide Paternal Grandfather      Depression Paternal Grandfather         Pediatrician. Committed suicide by pistol in 1990.     Musculoskeletal Disorder Brother         back     Depression Brother         Expressed as anger and moodiness     Substance Abuse Sister      Depression Sister         Mental Health Therapist, yet no anti-depressants?     Anxiety Disorder Sister         Mental Health Therapist, yet no anti-anxiety meds?     " Other Cancer Other         Bladder Cancer - Fatal     Substance Abuse Brother      Colon Cancer No family hx of      Crohn's Disease No family hx of      Anesthesia Reaction No family hx of      Cancer No family hx of         No family history of skin cancer       Social History     Tobacco Use     Smoking status: Never Smoker     Smokeless tobacco: Never Used   Substance Use Topics     Alcohol use: Yes     Alcohol/week: 0.0 standard drinks     Drinks per session: 1 or 2     Binge frequency: Weekly     Comment: occ 1/week         Current Outpatient Medications:      acetaminophen 500 MG CAPS, Take 500 mg by mouth every 4 hours as needed, Disp: 60 capsule, Rfl:      adalimumab (HUMIRA *CF*) 40 MG/0.4ML pen kit, Inject 0.4 mLs (40 mg) Subcutaneous every 14 days . Hold for signs of infection, then seek medical attention., Disp: 2.4 mL, Rfl: 3     albuterol (PROAIR HFA/PROVENTIL HFA/VENTOLIN HFA) 108 (90 Base) MCG/ACT inhaler, Inhale 2 puffs into the lungs every 4 hours as needed for shortness of breath / dyspnea or wheezing, Disp: 8.5 g, Rfl: 11     ALPRAZolam (XANAX XR) 1 MG 24 hr tablet, Take 1 mg by mouth daily, Disp: , Rfl:      aspirin (ASA) 81 MG tablet, Take 81 mg by mouth daily , Disp: , Rfl:      blood glucose monitoring (NO BRAND SPECIFIED) meter device kit, Use to test blood sugar 2 times daily or as directed. Include test strips (2 boxes) with 3 refills, Disp: 1 kit, Rfl: 0     celecoxib (CELEBREX) 200 MG capsule, TAKE 1 CAPSULE BY MOUTH TWICE DAILY AS NEEDED FOR MODERATE PAIN., Disp: 180 capsule, Rfl: 1     cetirizine (ZYRTEC) 10 MG tablet, Take 1 tablet (10 mg) by mouth At Bedtime, Disp: 30 tablet, Rfl: 11     cholecalciferol (VITAMIN D3) 49643 units (1250 mcg) capsule, Take one capsule every two weeks., Disp: 24 capsule, Rfl: 1     cyanocobalamin (CYANOCOBALAMIN) 1000 MCG/ML injection, Inject 1 mL (1,000 mcg) into the muscle every 30 days, Disp: 10 mL, Rfl: 0     DULoxetine (CYMBALTA) 60 MG capsule, Take  60 mg by mouth daily, Disp: , Rfl:      EPINEPHrine (EPIPEN/ADRENACLICK/OR ANY BX GENERIC EQUIV) 0.3 MG/0.3ML injection 2-pack, Inject 0.3 mLs (0.3 mg) into the muscle once as needed for anaphylaxis, Disp: 0.6 mL, Rfl: 3     famotidine (PEPCID) 10 MG tablet, Take 10 mg by mouth Prior to administration of Humira every 2 weeks, Disp: , Rfl:      fluticasone-salmeterol (ADVAIR DISKUS) 250-50 MCG/DOSE inhaler, Inhale 1 puff into the lungs 2 times daily, Disp: 1 Inhaler, Rfl: 11     Ginger, Zingiber officinalis, (GINGER ROOT) 550 MG CAPS capsule, Take 550 mg by mouth Up to three times daily, Disp: , Rfl:      hydrOXYzine (ATARAX) 50 MG tablet, Take 50 mg by mouth 2 times daily For anxiety and insomnia, Disp: , Rfl:      lamoTRIgine (LAMICTAL) 100 MG tablet, Take 200 mg by mouth daily, Disp: , Rfl:      levothyroxine (SYNTHROID/LEVOTHROID) 75 MCG tablet, TAKE 1 TABLET EVERY MORNING, Disp: 90 tablet, Rfl: 1     lidocaine (XYLOCAINE) 5 % external ointment, UAD, Disp: 30 g, Rfl:      loperamide (IMODIUM) 2 MG capsule, Take 2 mg by mouth 4 times daily as needed for diarrhea, Disp: , Rfl:      melatonin 3 MG tablet, Take 1 mg by mouth At Bedtime, Disp: , Rfl:      metFORMIN (GLUCOPHAGE-XR) 500 MG 24 hr tablet, Take 2 tablets (1,000 mg) by mouth daily (with dinner), Disp: 180 tablet, Rfl: 3     metoclopramide (REGLAN) 5 MG tablet, Take 1 tablet (5 mg) by mouth 3 times daily as needed (nausea), Disp: 20 tablet, Rfl: 3     metoprolol succinate ER (TOPROL-XL) 200 MG 24 hr tablet, TAKE 2 TABLETS (400MG) DAILY (Patient taking differently: Take 200 mg by mouth 2 times daily ), Disp: 180 tablet, Rfl: 0     montelukast (SINGULAIR) 10 MG tablet, Take 1 tablet (10 mg) by mouth every evening, Disp: 90 tablet, Rfl: 1     mupirocin (BACTROBAN) 2 % external ointment, KESHA EXT AA TID FOR 5 DAYS, Disp: , Rfl: 1     omega-3 acid ethyl esters (LOVAZA) 1 g capsule, TAKE 2 CAPSULES BY MOUTH TWICE DAILY., Disp: 360 capsule, Rfl: 2     omeprazole 20  MG tablet, Take 20 mg by mouth daily , Disp: , Rfl:      ondansetron (ZOFRAN) 8 MG tablet, TAKE 1 TABLET BY MOUTH EVERY 8 HOURS AS NEEDED FOR NAUSEA OR VOMITING, Disp: 20 tablet, Rfl: 4     order for DME, Respironics REMSTAR 60 Series Auto CPAP 9-13 cm H2O, Wisp nasal mask w/a large cushion and a chinstrap, Disp: , Rfl:      oxyCODONE (ROXICODONE) 5 MG tablet, Take 1-2 tablets (5-10 mg) by mouth every 6 hours as needed for pain (maximum 4 tablet(s) per day), Disp: 35 tablet, Rfl: 0     pregabalin (LYRICA) 150 MG capsule, Take 1 capsule (150 mg) by mouth 2 times daily, Disp: 60 capsule, Rfl: 5     pseudoePHEDrine (SUDAFED) 120 MG 12 hr tablet, Take 1 tablet (120 mg) by mouth every 12 hours, Disp: 28 tablet, Rfl: 0     QUEtiapine (SEROQUEL) 25 MG tablet, Take 25 mg by mouth 4 times daily as needed, Disp: , Rfl:      ramipril (ALTACE) 10 MG capsule, Take 1 capsule (10 mg) by mouth daily, Disp: 90 capsule, Rfl: 1     rizatriptan (MAXALT-MLT) 5 MG ODT tab, Take 1 tablet (5 mg) by mouth at onset of headache for migraine, Disp: 30 tablet, Rfl: 5     rosuvastatin (CRESTOR) 40 MG tablet, Take 1 tablet (40 mg) by mouth daily, Disp: 90 tablet, Rfl: 2     syringe, disposable, (BD TUBERCULIN SYRINGE) 1 ML MISC, Equipment being ordered: 1 ml tuberculin syringes to be used for Vitamin B12 injections., Disp: 12 each, Rfl: 1     vitamin C (ASCORBIC ACID) 500 MG tablet, Take 500 mg by mouth daily, Disp: , Rfl:        Allergies   Allergen Reactions     Amoxicillin-Pot Clavulanate Difficulty breathing     Banana Shortness Of Breath     Pt reports organic Banana is okay.      Nitroglycerin Palpitations     Penicillins Anaphylaxis     Provigil [Modafinil] Shortness Of Breath     headache     Gadolinium Hives and Itching     Patient was premedicated for the contrast allergy. He did still have a reaction a few hours after injection. Hives and itching. Dr. Gomez told tech to inform pt he should only have contrast again in the future  when premedicated and at a hospital. Not at an outpatient facility.      Ketoconazole      Topical cream caused swelling and itching     Dye [Contrast Dye] Other (See Comments) and Hives     Moderate flushing, CT contrast     Golimumab      Hives, bradycardia, face swelling     Neurontin [Gabapentin] Hives     Moderate hives     Nortriptyline Hives     Varicella Virus Vaccine Live      Rash     Flagyl [Metronidazole Hcl] Palpitations and Hives     Latex Rash     Metronidazole Palpitations, Other (See Comments) and Rash     dizziness (versus ciprofloxacin taken at same time)     No Clinical Screening - See Comments Rash     Nitrile gloves       /78   Pulse 74   Wt 104.8 kg (231 lb)   BMI 27.39 kg/m   .      Constitutional/ general:  Pt is in no apparent distress, appears well-nourished.  Cooperative with history and physical exam.     Psych:  The patient answered questions appropriately.  Normal affect.  Seems to have reasonable expectations, in terms of treatment.     Eyes:  Visual scanning/ tracking without deficit.     Ears:  Response to auditory stimuli is normal.  No  hearing aid devices.  Auricles in proper alignment.     Lymphatic:  Popliteal lymph nodes not enlarged.     Lungs:  Non labored breathing, non labored speech. No cough.  No audible wheezing. Even, quiet breathing.       Vascular:  Pedal pulses are palpable bilaterally for both the DP and PT arteries.  CFT < 3 sec.  No ankle varicosities or edema.  Pedal hair growth noted.     Neuro:  Alert and oriented x 3. Coordinated gait.  Light touch sensation is intact to the L4, L5, S1 distributions. No obvious deficits.  No evidence of neurological-based weakness, spasticity, or contracture in the lower extremities.     Derm: Normal texture and turgor.  No ecchymosis, or cyanosis.  Hair growth noted.        Musculoskeletal:     Patient is ambulatory without an assistive device or brace.  No gross deformities.  Cavus  arch with weightbearing.  No  forefoot or rear foot deformities noted.  MS 5/5 all compartments.  Normal ROM all fore foot and rearfoot joints.  No equinus.  With standing patient has wide forefoot and large space in between first and second toes with a slight hallux varus. Beau's lines noted in both halluces.    right great toe nail medial border shows soft tissue impingement with localized erythema.   Negative active drainage/purulence at this time.  No sinus tracts.  No nailbed masses or exostosis.  No pain with range of motion of IPJ or MTPJ.  No ascending cellulitis.    ASSESSMENT:    Onychocryptosis with paronychia right toe  Bilateral Beau's lines    Discussed the cause of his abuse lines.  Explained this is from pressure with his wide forefoot and slightly varus halluces.  He will keep the nails cut short and straight across.  I made suggestions on shoes that would accommodate his hallux varus..    Discussed etiology and treatment options in detail w/ the pt.  The potential causes and nature of an ingrown toenail were discussed with the patient.  We reviewed the natural history/prognosis of the condition and potential risks if no treatment is provided.      Treatment options discussed included conservative management (oral antibiotics, soaking of foot, adequate width shoes)  as well as surgical management (partial or total nail removal).  The pros and cons of both forms of treatment were reviewed.  Handout given to patient.      After thorough discussion and answering all questions, the patient elected to have nail avulsion.  Obtained consent, used 3cc of 1% lidocaine plain to block right first toe.  Sterile prep, then avulsed the affected border(s).  No evidence of deep abscess noted.  Pt tolerated procedure well.  Sterile bandage placed, gave wound care instruction.  Return to clinic prn.  Thank you for allowing me participate in the care of this patient.        Peng Perez, LIAN DPM, FACFAS      Again, thank you for allowing me to  participate in the care of your patient.        Sincerely,        Peng Perez DPM

## 2019-10-23 NOTE — PROGRESS NOTES
Subjective:    Pt is seen today in consult form Dr. Nini Webber w/ the c/c of a painful ingrown right great nail medial border.  This has been problematic for 1 month(s).  negativehistory of drainage from the site. This is slowly getting worse.  Aggravated by activity and relieved by rest.  Has tried soaking which has not helped.   denies history of trauma to the area.  Denies fever and chill, denies numbness and tingling, denies erythema on dorsum of foot.   Patient has diabetes with numbness in his feet.  He has a history of pulmonary embolism.      ROS:  A 10-point review of systems was performed and is positive for that noted in the HPI and as seen below.  All other areas are negative.     Past Medical History:   Diagnosis Date     Acne      Allergic state      Anxiety      Bipolar 2 disorder (H)      Chest pain     Chest pain, regulated w/BP meds. Clear arteries.     Chronic pain      Depressive disorder      Diabetes (H)      Diverticulosis      Gastroesophageal reflux disease      Hypertension      IBS (irritable bowel syndrome)      Intracranial arachnoid cyst      Polyneuropathy      Pulmonary embolism (H)      Skin exam, screening for cancer 12/3/2013     Sleep apnea      Uncomplicated asthma        Past Surgical History:   Procedure Laterality Date     BACK SURGERY  10/07    lumbar discectomy L5-S1     COLONOSCOPY      Note: colonoscopy scheduled with [facility] on Friday, 9/4/15     COSMETIC SURGERY  2012    Nose Exterior - functional     GI SURGERY  August 2013    Sigmoidectomy     HERNIA REPAIR, UMBILICAL  8/23/11    Dr. Evan whiting     INCISION AND DRAINAGE, ABSCESS, COMPLEX  8/23/11    umbilical, Dr. Evan Beavers     LAPAROSCOPIC ASSISTED COLECTOMY LEFT (DESCENDING)  8/15/2013    Procedure: LAPAROSCOPIC ASSISTED COLECTOMY LEFT (DESCENDING);  Laparoscopic Hand Assisted Sigmoid Resection, Mobilization of Splenic Fissure, coloproctoscopy, *Latex Free Room* Anesthesia General with Pain block  ;   "Surgeon: Aurora Justice MD;  Location: [facility] OR     NERVE SURGERY  8/18/11    RF ablation @ L3-S1 @ [facility]     RECONSTRUCT NOSE AND SEPTUM (FUNCTIONAL)  10/14/2011    Procedure:RECONSTRUCT NOSE AND SEPTUM (FUNCTIONAL); Functional Septorhinoplasty, Turbinate Reduction, ; Surgeon:CEDRIC CUEVAS; Location:[facility] OR     SINUS SURGERY  10/1/01    ethmoidectomy chronic sinusitis       Family History   Problem Relation Age of Onset     Musculoskeletal Disorder Mother         back     Anxiety Disorder Mother      Colon Polyps Mother      Ulcerative Colitis Mother         and ischemic small intestine, surgery     Hypertension Mother      Breast Cancer Mother      Osteoporosis Mother      Diabetes Mother         Type 2, Diagnosed in 2014     Depression Mother         Takes Cymbalta to help with chronic pain + depx     Thyroid Disease Mother         Hypothyroidism     Obesity Mother         Under much better control latter half of 2015     Musculoskeletal Disorder Father         back     Substance Abuse Father      Hypertension Father      Hyperlipidemia Father      Depression Father         Off meds for many years. Seems \"ok\"     Heart Disease Maternal Grandmother      Heart Disease Maternal Grandfather      Psychotic Disorder Paternal Grandfather      Suicide Paternal Grandfather      Depression Paternal Grandfather         Pediatrician. Committed suicide by pistol in 1990.     Musculoskeletal Disorder Brother         back     Depression Brother         Expressed as anger and moodiness     Substance Abuse Sister      Depression Sister         Mental Health Therapist, yet no anti-depressants?     Anxiety Disorder Sister         Mental Health Therapist, yet no anti-anxiety meds?     Other Cancer Other         Bladder Cancer - Fatal     Substance Abuse Brother      Colon Cancer No family hx of      Crohn's Disease No family hx of      Anesthesia Reaction No family hx of      Cancer No family hx of         No family history of skin " cancer       Social History     Tobacco Use     Smoking status: Never Smoker     Smokeless tobacco: Never Used   Substance Use Topics     Alcohol use: Yes     Alcohol/week: 0.0 standard drinks     Drinks per session: 1 or 2     Binge frequency: Weekly     Comment: occ 1/week         Current Outpatient Medications:      acetaminophen 500 MG CAPS, Take 500 mg by mouth every 4 hours as needed, Disp: 60 capsule, Rfl:      adalimumab (HUMIRA *CF*) 40 MG/0.4ML pen kit, Inject 0.4 mLs (40 mg) Subcutaneous every 14 days . Hold for signs of infection, then seek medical attention., Disp: 2.4 mL, Rfl: 3     albuterol (PROAIR HFA/PROVENTIL HFA/VENTOLIN HFA) 108 (90 Base) MCG/ACT inhaler, Inhale 2 puffs into the lungs every 4 hours as needed for shortness of breath / dyspnea or wheezing, Disp: 8.5 g, Rfl: 11     ALPRAZolam (XANAX XR) 1 MG 24 hr tablet, Take 1 mg by mouth daily, Disp: , Rfl:      aspirin (ASA) 81 MG tablet, Take 81 mg by mouth daily , Disp: , Rfl:      blood glucose monitoring (NO BRAND SPECIFIED) meter device kit, Use to test blood sugar 2 times daily or as directed. Include test strips (2 boxes) with 3 refills, Disp: 1 kit, Rfl: 0     celecoxib (CELEBREX) 200 MG capsule, TAKE 1 CAPSULE BY MOUTH TWICE DAILY AS NEEDED FOR MODERATE PAIN., Disp: 180 capsule, Rfl: 1     cetirizine (ZYRTEC) 10 MG tablet, Take 1 tablet (10 mg) by mouth At Bedtime, Disp: 30 tablet, Rfl: 11     cholecalciferol (VITAMIN D3) 36608 units (1250 mcg) capsule, Take one capsule every two weeks., Disp: 24 capsule, Rfl: 1     cyanocobalamin (CYANOCOBALAMIN) 1000 MCG/ML injection, Inject 1 mL (1,000 mcg) into the muscle every 30 days, Disp: 10 mL, Rfl: 0     DULoxetine (CYMBALTA) 60 MG capsule, Take 60 mg by mouth daily, Disp: , Rfl:      EPINEPHrine (EPIPEN/ADRENACLICK/OR ANY BX GENERIC EQUIV) 0.3 MG/0.3ML injection 2-pack, Inject 0.3 mLs (0.3 mg) into the muscle once as needed for anaphylaxis, Disp: 0.6 mL, Rfl: 3     famotidine (PEPCID) 10 MG  tablet, Take 10 mg by mouth Prior to administration of Humira every 2 weeks, Disp: , Rfl:      fluticasone-salmeterol (ADVAIR DISKUS) 250-50 MCG/DOSE inhaler, Inhale 1 puff into the lungs 2 times daily, Disp: 1 Inhaler, Rfl: 11     Ginger, Zingiber officinalis, (GINGER ROOT) 550 MG CAPS capsule, Take 550 mg by mouth Up to three times daily, Disp: , Rfl:      hydrOXYzine (ATARAX) 50 MG tablet, Take 50 mg by mouth 2 times daily For anxiety and insomnia, Disp: , Rfl:      lamoTRIgine (LAMICTAL) 100 MG tablet, Take 200 mg by mouth daily, Disp: , Rfl:      levothyroxine (SYNTHROID/LEVOTHROID) 75 MCG tablet, TAKE 1 TABLET EVERY MORNING, Disp: 90 tablet, Rfl: 1     lidocaine (XYLOCAINE) 5 % external ointment, UAD, Disp: 30 g, Rfl:      loperamide (IMODIUM) 2 MG capsule, Take 2 mg by mouth 4 times daily as needed for diarrhea, Disp: , Rfl:      melatonin 3 MG tablet, Take 1 mg by mouth At Bedtime, Disp: , Rfl:      metFORMIN (GLUCOPHAGE-XR) 500 MG 24 hr tablet, Take 2 tablets (1,000 mg) by mouth daily (with dinner), Disp: 180 tablet, Rfl: 3     metoclopramide (REGLAN) 5 MG tablet, Take 1 tablet (5 mg) by mouth 3 times daily as needed (nausea), Disp: 20 tablet, Rfl: 3     metoprolol succinate ER (TOPROL-XL) 200 MG 24 hr tablet, TAKE 2 TABLETS (400MG) DAILY (Patient taking differently: Take 200 mg by mouth 2 times daily ), Disp: 180 tablet, Rfl: 0     montelukast (SINGULAIR) 10 MG tablet, Take 1 tablet (10 mg) by mouth every evening, Disp: 90 tablet, Rfl: 1     mupirocin (BACTROBAN) 2 % external ointment, KESHA EXT AA TID FOR 5 DAYS, Disp: , Rfl: 1     omega-3 acid ethyl esters (LOVAZA) 1 g capsule, TAKE 2 CAPSULES BY MOUTH TWICE DAILY., Disp: 360 capsule, Rfl: 2     omeprazole 20 MG tablet, Take 20 mg by mouth daily , Disp: , Rfl:      ondansetron (ZOFRAN) 8 MG tablet, TAKE 1 TABLET BY MOUTH EVERY 8 HOURS AS NEEDED FOR NAUSEA OR VOMITING, Disp: 20 tablet, Rfl: 4     order for DME, Respironics REMSTAR 60 Series Auto CPAP 9-13  cm H2O, Wisp nasal mask w/a large cushion and a chinstrap, Disp: , Rfl:      oxyCODONE (ROXICODONE) 5 MG tablet, Take 1-2 tablets (5-10 mg) by mouth every 6 hours as needed for pain (maximum 4 tablet(s) per day), Disp: 35 tablet, Rfl: 0     pregabalin (LYRICA) 150 MG capsule, Take 1 capsule (150 mg) by mouth 2 times daily, Disp: 60 capsule, Rfl: 5     pseudoePHEDrine (SUDAFED) 120 MG 12 hr tablet, Take 1 tablet (120 mg) by mouth every 12 hours, Disp: 28 tablet, Rfl: 0     QUEtiapine (SEROQUEL) 25 MG tablet, Take 25 mg by mouth 4 times daily as needed, Disp: , Rfl:      ramipril (ALTACE) 10 MG capsule, Take 1 capsule (10 mg) by mouth daily, Disp: 90 capsule, Rfl: 1     rizatriptan (MAXALT-MLT) 5 MG ODT tab, Take 1 tablet (5 mg) by mouth at onset of headache for migraine, Disp: 30 tablet, Rfl: 5     rosuvastatin (CRESTOR) 40 MG tablet, Take 1 tablet (40 mg) by mouth daily, Disp: 90 tablet, Rfl: 2     syringe, disposable, (BD TUBERCULIN SYRINGE) 1 ML MISC, Equipment being ordered: 1 ml tuberculin syringes to be used for Vitamin B12 injections., Disp: 12 each, Rfl: 1     vitamin C (ASCORBIC ACID) 500 MG tablet, Take 500 mg by mouth daily, Disp: , Rfl:        Allergies   Allergen Reactions     Amoxicillin-Pot Clavulanate Difficulty breathing     Banana Shortness Of Breath     Pt reports organic Banana is okay.      Nitroglycerin Palpitations     Penicillins Anaphylaxis     Provigil [Modafinil] Shortness Of Breath     headache     Gadolinium Hives and Itching     Patient was premedicated for the contrast allergy. He did still have a reaction a few hours after injection. Hives and itching. Dr. Gomez told tech to inform pt he should only have contrast again in the future when premedicated and at a hospital. Not at an outpatient facility.      Ketoconazole      Topical cream caused swelling and itching     Dye [Contrast Dye] Other (See Comments) and Hives     Moderate flushing, CT contrast     Golimumab      Hives,  bradycardia, face swelling     Neurontin [Gabapentin] Hives     Moderate hives     Nortriptyline Hives     Varicella Virus Vaccine Live      Rash     Flagyl [Metronidazole Hcl] Palpitations and Hives     Latex Rash     Metronidazole Palpitations, Other (See Comments) and Rash     dizziness (versus ciprofloxacin taken at same time)     No Clinical Screening - See Comments Rash     Nitrile gloves       /78   Pulse 74   Wt 104.8 kg (231 lb)   BMI 27.39 kg/m  .      Constitutional/ general:  Pt is in no apparent distress, appears well-nourished.  Cooperative with history and physical exam.     Psych:  The patient answered questions appropriately.  Normal affect.  Seems to have reasonable expectations, in terms of treatment.     Eyes:  Visual scanning/ tracking without deficit.     Ears:  Response to auditory stimuli is normal.  No  hearing aid devices.  Auricles in proper alignment.     Lymphatic:  Popliteal lymph nodes not enlarged.     Lungs:  Non labored breathing, non labored speech. No cough.  No audible wheezing. Even, quiet breathing.       Vascular:  Pedal pulses are palpable bilaterally for both the DP and PT arteries.  CFT < 3 sec.  No ankle varicosities or edema.  Pedal hair growth noted.     Neuro:  Alert and oriented x 3. Coordinated gait.  Light touch sensation is intact to the L4, L5, S1 distributions. No obvious deficits.  No evidence of neurological-based weakness, spasticity, or contracture in the lower extremities.     Derm: Normal texture and turgor.  No ecchymosis, or cyanosis.  Hair growth noted.        Musculoskeletal:     Patient is ambulatory without an assistive device or brace.  No gross deformities.  Cavus  arch with weightbearing.  No forefoot or rear foot deformities noted.  MS 5/5 all compartments.  Normal ROM all fore foot and rearfoot joints.  No equinus.  With standing patient has wide forefoot and large space in between first and second toes with a slight hallux varus. Beau's  lines noted in both halluces.    right great toe nail medial border shows soft tissue impingement with localized erythema.   Negative active drainage/purulence at this time.  No sinus tracts.  No nailbed masses or exostosis.  No pain with range of motion of IPJ or MTPJ.  No ascending cellulitis.    ASSESSMENT:    Onychocryptosis with paronychia right toe  Bilateral Beau's lines    Discussed the cause of his abuse lines.  Explained this is from pressure with his wide forefoot and slightly varus halluces.  He will keep the nails cut short and straight across.  I made suggestions on shoes that would accommodate his hallux varus..    Discussed etiology and treatment options in detail w/ the pt.  The potential causes and nature of an ingrown toenail were discussed with the patient.  We reviewed the natural history/prognosis of the condition and potential risks if no treatment is provided.      Treatment options discussed included conservative management (oral antibiotics, soaking of foot, adequate width shoes)  as well as surgical management (partial or total nail removal).  The pros and cons of both forms of treatment were reviewed.  Handout given to patient.      After thorough discussion and answering all questions, the patient elected to have nail avulsion.  Obtained consent, used 3cc of 1% lidocaine plain to block right first toe.  Sterile prep, then avulsed the affected border(s).  No evidence of deep abscess noted.  Pt tolerated procedure well.  Sterile bandage placed, gave wound care instruction.  Return to clinic prn.  Thank you for allowing me participate in the care of this patient.        Peng Perez, LIAN DPCHEY, FACFAS

## 2019-10-23 NOTE — PATIENT INSTRUCTIONS
Weight management plan: Patient was referred to their PCP to discuss a diet and exercise plan.  We wish you continued good healing. If you have any questions or concerns, please do not hesitate to contact us at [phone]    Please remember to call and schedule a follow up appointment if one was recommended at your earliest convenience.   PODIATRY CLINIC HOURS  TELEPHONE NUMBER    Dr. Peng Perez D.P.M [facility]    Clinics:  [facility]    [facility]   Tuesday 1PM-6PM  [place]  Wednesday 7AM-2PM  [facility]  Thursday 10AM-6PM  [place]  Friday 7AM-3PM  [place]  Specialty schedulers:   [phone] to make an appointment with any Specialty Provider.        Urgent Care locations:    [facility] Monday-Friday 5 pm - 9 pm. Saturday-Sunday 9 am -5pm    Monday-Friday 11 am - 9 pm Saturday 9 am - 5 pm     Monday-Sunday 12 noon-8PM [phone]     If you need a medication refill, please contact us you may need lab work and/or a follow up visit prior to your refill (i.e. Antifungal medications).    [email or web address] (secure e-mail communication and access to your chart) to send a message or to make an appointment.    If MRI needed please call [facility] at [phone]        Tito to follow up with Primary Care provider regarding elevated blood pressure.

## 2019-10-24 ENCOUNTER — TELEPHONE (OUTPATIENT)
Dept: FAMILY MEDICINE | Facility: CLINIC | Age: 44
End: 2019-10-24

## 2019-10-27 ENCOUNTER — TRANSFERRED RECORDS (OUTPATIENT)
Dept: HEALTH INFORMATION MANAGEMENT | Facility: CLINIC | Age: 44
End: 2019-10-27

## 2019-10-29 NOTE — PROGRESS NOTES
"    Neurosurgery Clinic Note    Chief Complaint: back pain    History of Present Illness:  It was a pleasure to evaluate Joel Pineda in clinic today       Joel Pineda is a 44 year old male with mood disorder and ankylosing spondylitis, on Humira, presenting with chronic low back pain and buttock pain, and sensation of numbness in entire feet and calves.   Back pain is worst with sitting. Relieved minorly by changing positions. Rarely has radiating pain down legs, only when having \"severe flare\" of pain.    EMG by Dr. Santamaria in 3/2019 did not show any radiculopathy; did show peripheral neuropathy  Neurology note by DR. Malone indicated likely diabetic neuropathy contributing.    Prior L5-S1 microdiskectomy in 2007 at outside hospital- he says within 8 weeks of this he was in \"agonizing pain\" again and does not feel it benefitted him overall.     Of note, in the past 3 months alone, patient has been seen in ER and made multiple calls to physicians' offices for issues ranging from big toe infection from filing his nails, diarrhea, abdominal pain, and headache. He continues unfortunately to be suffering from pain in his toe at site of injury from nail file and is being treated for this.          Review of Systems    Answers for HPI/ROS submitted by the patient on 10/17/2019   General Symptoms: Yes  Skin Symptoms: Yes  HENT Symptoms: Yes  EYE SYMPTOMS: No  HEART SYMPTOMS: No  LUNG SYMPTOMS: No  INTESTINAL SYMPTOMS: Yes  URINARY SYMPTOMS: No  REPRODUCTIVE SYMPTOMS: No  SKELETAL SYMPTOMS: Yes  BLOOD SYMPTOMS: No  NERVOUS SYSTEM SYMPTOMS: Yes  MENTAL HEALTH SYMPTOMS: Yes  Fever: No  Loss of appetite: No  Weight loss: No  Weight gain: No  Fatigue: Yes  Night sweats: No  Chills: No  Increased stress: Yes  Excessive hunger: No  Excessive thirst: No  Feeling hot or cold when others believe the temperature is normal: Yes  Loss of height: No  Post-operative complications: No  Surgical site pain: No  Hallucinations: " No  Change in or Loss of Energy: Yes  Hyperactivity: Yes  Confusion: No  Changes in hair: No  Changes in moles/birth marks: No  Itching: No  Rashes: No  Changes in nails: Yes  Acne: No  Change in facial hair: No  Warts: No  Non-healing sores: No  Scarring: No  Flaking of skin: No  Color changes of hands/feet in cold : No  Sun sensitivity: No  Skin thickening: No  Ear pain: No  Ear discharge: No  Hearing loss: No  Tinnitus: No  Nosebleeds: No  Congestion: Yes  Sinus pain: No  Trouble swallowing: No   Voice hoarseness: No  Mouth sores: No  Sore throat: No  Tooth pain: No  Gum tenderness: No  Bleeding gums: No  Change in taste: No  Change in sense of smell: No  Dry mouth: No  Hearing aid used: No  Neck lump: No  Heart burn or indigestion: No  Nausea: Yes  Vomiting: No  Abdominal pain: No  Bloating: No  Constipation: No  Diarrhea: Yes  Blood in stool: No  Black stools: No  Rectal or Anal pain: No  Fecal incontinence: No  Yellowing of skin or eyes: No  Vomit with blood: No  Change in stools: No  Back pain: Yes  Muscle aches: Yes  Neck pain: Yes  Swollen joints: No  Joint pain: Yes  Bone pain: No  Muscle cramps: No  Muscle weakness: Yes  Joint stiffness: Yes  Bone fracture: No  Trouble with coordination: No  Dizziness or trouble with balance: No  Fainting or black-out spells: No  Memory loss: Yes  Headache: Yes  Seizures: No  Speech problems: No  Tingling: No  Tremor: No  Weakness: No  Difficulty walking: Yes  Paralysis: No  Numbness: Yes  Nervous or Anxious: Yes  Depression: Yes  Trouble sleeping: No  Trouble thinking or concentrating: Yes  Mood changes: Yes  Panic attacks: No      Past Medical History:   Diagnosis Date     Acne      Allergic state      Anxiety      Bipolar 2 disorder (H)      Chest pain     Chest pain, regulated w/BP meds. Clear arteries.     Chronic pain      Depressive disorder      Diabetes (H)      Diverticulosis      Gastroesophageal reflux disease      Hypertension      IBS (irritable bowel  syndrome)      Intracranial arachnoid cyst      Polyneuropathy      Pulmonary embolism (H)      Skin exam, screening for cancer 12/3/2013     Sleep apnea      Uncomplicated asthma        Past Surgical History:   Procedure Laterality Date     BACK SURGERY  10/07    lumbar discectomy L5-S1     COLONOSCOPY      Note: colonoscopy scheduled with [facility] on Friday, 9/4/15     COSMETIC SURGERY  2012    Nose Exterior - functional     GI SURGERY  August 2013    Sigmoidectomy     HERNIA REPAIR, UMBILICAL  8/23/11    [place], Dr. Evan Beavers     INCISION AND DRAINAGE, ABSCESS, COMPLEX  8/23/11    umbilical, Dr. Evan Beavers     LAPAROSCOPIC ASSISTED COLECTOMY LEFT (DESCENDING)  8/15/2013    Procedure: LAPAROSCOPIC ASSISTED COLECTOMY LEFT (DESCENDING);  Laparoscopic Hand Assisted Sigmoid Resection, Mobilization of Splenic Fissure, coloproctoscopy, *Latex Free Room* Anesthesia General with Pain block  ;  Surgeon: Aurora Justice MD;  Location: [facility] OR     NERVE SURGERY  8/18/11    RF ablation @ L3-S1 @ [facility]     RECONSTRUCT NOSE AND SEPTUM (FUNCTIONAL)  10/14/2011    Procedure:RECONSTRUCT NOSE AND SEPTUM (FUNCTIONAL); Functional Septorhinoplasty, Turbinate Reduction, ; Surgeon:CEDIRC CUEVAS; Location:[facility] OR     SINUS SURGERY  10/1/01    ethmoidectomy chronic sinusitis       Social History     Socioeconomic History     Marital status: Single     Spouse name: Not on file     Number of children: Not on file     Years of education: Not on file     Highest education level: Not on file   Occupational History     Occupation:      Employer: [employer]     Occupation: paraprofessional     Comment: [employer]     Employer: [employer]   Social Needs     Financial resource strain: Not on file     Food insecurity:     Worry: Not on file     Inability: Not on file     Transportation needs:     Medical: Not on file     Non-medical: Not on file   Tobacco Use     Smoking status: Never Smoker     Smokeless tobacco: Never Used    Substance and Sexual Activity     Alcohol use: Yes     Alcohol/week: 0.0 standard drinks     Drinks per session: 1 or 2     Binge frequency: Weekly     Comment: occ 1/week     Drug use: No     Sexual activity: Never     Partners: Female   Lifestyle     Physical activity:     Days per week: Not on file     Minutes per session: Not on file     Stress: Not on file   Relationships     Social connections:     Talks on phone: Not on file     Gets together: Not on file     Attends Baptism service: Not on file     Active member of club or organization: Not on file     Attends meetings of clubs or organizations: Not on file     Relationship status: Not on file     Intimate partner violence:     Fear of current or ex partner: Not on file     Emotionally abused: Not on file     Physically abused: Not on file     Forced sexual activity: Not on file   Other Topics Concern     Parent/sibling w/ CABG, MI or angioplasty before 65F 55M? No      Service Not Asked     Blood Transfusions Not Asked     Caffeine Concern Not Asked     Occupational Exposure Not Asked     Hobby Hazards Not Asked     Sleep Concern Yes     Stress Concern Yes     Weight Concern Not Asked     Special Diet Not Asked     Back Care Yes     Exercise Not Asked     Bike Helmet Not Asked     Seat Belt Yes     Self-Exams Not Asked   Social History Narrative     Not on file       family history includes Anxiety Disorder in his mother and sister; Breast Cancer in his mother; Colon Polyps in his mother; Depression in his brother, father, mother, paternal grandfather, and sister; Diabetes in his mother; Heart Disease in his maternal grandfather and maternal grandmother; Hyperlipidemia in his father; Hypertension in his father and mother; Musculoskeletal Disorder in his brother, father, and mother; Obesity in his mother; Osteoporosis in his mother; Other Cancer in an other family member; Psychotic Disorder in his paternal grandfather; Substance Abuse in his  brother, father, and sister; Suicide in his paternal grandfather; Thyroid Disease in his mother; Ulcerative Colitis in his mother.        IMAGING per my own measurement and interpretation:  xrays- partial ankylosis of SI joints        Us Jaqueline Doppler With Exercise Bilateral    Result Date: 4/10/2019  PROCEDURE: JAQUELINE with Doppler Waveforms, segmental pressures, and JAQUELINE exercise of the bilateral lower extremities DATE OF PROCEDURE: 4/10/2019 1:31 PM CLINICAL HISTORY/INDICATION: 43-year-old diabetic male with intermittent pain/cramping in his legs during walking. COMPARISON: None relevant TECHNIQUE: Resting and exercise ankle branchial indices and waveform analysis of the bilateral lower extremities FINDINGS: The patient walked on a treadmill for 5 minutes at a 10% incline at 1.5 mph.  The patient had mild left distal calf pain at 45 seconds and bilateral thigh pain at 2 minutes and 15 seconds.. The resting and exercise ABIs on the right are 1.23 and 1.24 respectively The resting and exercise ABIs on the left are 1.26 and 1.26 respectively Resting ABIs on the right 1.23 with multiphasic waveforms within the posterior tibial artery. Monophasic waveforms within the dorsalis pedis. Normal digital waveforms. Resting ABIs on the right 1.26 with multiphasic waveforms in the dorsalis pedis and posterior tibial distribution as well as normal digital waveforms.     IMPRESSION: Normal resting and exercise ABIs bilaterally. Diminished right dorsalis pedis waveforms suggesting anterior tibial stenosis however normal digital waveforms on the right. JAQUELINE Diagnostic Criteria   >/= 1.3          Non compressible  0.95 - 1.29     Normal  0.90 - 0.94     Mild PAD  0.50 - 0.89     Moderate PAD  0.20 - 0.49     Severe PAD  < 0.20             Critical PAD ORLY HERNANDEZ MD      Result Date: 9/17/2019  XR LUMBAR SPINE FOUR OR MORE VIEWS   9/17/2019 1:04 PM HISTORY: Evaluate, lumbar radiculopathy.  COMPARISON: None.     IMPRESSION: Minimal  anterior osteophytes are seen at the thoracolumbar junction. Posterior alignment is normal. No evidence for fracture. JOSELINE SHEPPARD MD    Xr Sacroiliac Joint 1/2 Views    Result Date: 2/28/2018  XR SACROILIAC JOINT 1/2 VW 2/28/2018 12:26 PM HISTORY: Ankylosing spondylitis. COMPARISON: None.     IMPRESSION: Normal bilateral sacroiliac joints. ENMA HUNG MD    Mr Lumbar Spine W/o Contrast    Result Date: 6/27/2019  MR LUMBAR SPINE W/O CONTRAST 6/27/2019 11:44 AM Provided History: Radiculopathy, > 6wks conservative tx, persistent sx; ankylosing spondylitis.  New right foot drop/weakness, intermittent with activity.  H/o epidural steroid injection >2 weeks ago.  No fixed symptoms.; Lumbar radiculopathy ICD-10: Lumbar radiculopathy Comparison: Lumbar spine MRI 12/7/2016 Technique: Sagittal T1-weighted, sagittal STIR, 3D volumetric axial and sagittal reconstructed T2-weighted images of the lumbar spine were obtained without intravenous contrast. Findings: There are 5 lumbar-type vertebrae assumed for the purposes of this dictation.  The tip of the conus medullaris is at L1-L2. Normal lumbar vertebral alignment.  There is multilevel disc height narrowing , most pronounced at L3-S1.  Normal marrow signal. There are degenerative changes the superior endplate of L5. On a level by level basis: T12-L1: No spinal canal or neuroforaminal stenosis. L1-2: No spinal canal or neuroforaminal stenosis. L2-3: No spinal canal or neuroforaminal stenosis. L3-4: Mild broad-based disc bulge with a central annular fissure and superimposed central superiorly migrating disc extrusion. Mild bilateral facet arthropathy. Mild neural foraminal stenosis bilaterally. Mild canal is patent. L4-5: Broad-based disc bulge with superimposed central inferiorly migrating disc extrusion. Mild bilateral facet arthropathy. Mild bilateral neural foraminal stenosis. Spinal canal is patent. L5-S1: Broad-based disc bulge with superimposed central extrusion  inferiorly that abuts the traversing left S1 nerve root without evidence for impingement. Facet arthropathy bilaterally. Moderate to severe left and moderate right neural foraminal stenosis. Spinal canal is patent. Paraspinous tissues are within normal limits.     Impression: 1. Multilevel lumbar spondylosis, most pronounced at L5-S1 with moderate to severe left and moderate right neural foraminal stenosis as well as narrowing of the left lateral recess and abutment the traversing left S1 nerve root. 2. Additional multilevel degenerative changes of the lumbar spine as described above. I have personally reviewed the examination and initial interpretation and I agree with the findings. ORLY MCRAE MD      Vitamin D:  Vitamin D Deficiency Screening Results:  Lab Results   Component Value Date    VITDT 40 09/13/2019    VITDT 38 01/03/2019    VITDT 30 05/03/2017    VITDT 34 02/08/2016    VITDT 44 01/25/2016     25 OH Vit D2   Date Value Ref Range Status   09/26/2011 <5 ug/L Final   02/17/2011 <5 ug/L Final   08/28/2010 7 ug/L Final     25 OH Vit D3   Date Value Ref Range Status   09/26/2011 38 ug/L Final   02/17/2011 38 ug/L Final   08/28/2010 37 ug/L Final     25 OH Vit D total   Date Value Ref Range Status   09/26/2011  30 - 75 ug/L Final    <43  Season, race, dietary intake, and treatment affect the concentration of   25-hydroxy-Vitamin D. Values may decrease during winter months and increase   during summer months. Values less than 30 ug/L may indicate Vitamin D   deficiency.   02/17/2011  30 - 75 ug/L Final    <43  Season, race, dietary intake, and treatment affect the concentration of   25-hydroxy-Vitamin D. Values may decrease during winter months and increase   during summer months. Values less than 30 ug/L may indicate Vitamin D   deficiency.   08/28/2010 44 30 - 75 ug/L Final     Comment:     Season, race, dietary intake, and treatment affect the concentration of   25-hydroxy-Vitamin D. Values may  "decrease during winter months and increase   during summer months. Values less than 30 ug/L may indicate Vitamin D   deficiency.         Nutritional Status:  Estimated body mass index is 27.39 kg/m  as calculated from the following:    Height as of 10/8/19: 1.956 m (6' 5\").    Weight as of 10/23/19: 104.8 kg (231 lb).    Lab Results   Component Value Date    ALBUMIN 4.3 09/26/2019       Diabetes Screening:  Lab Results   Component Value Date    A1C 5.9 09/13/2019    A1C 6.1 03/19/2019    A1C 6.7 12/24/2018    A1C 5.8 05/01/2018    A1C 5.8 01/18/2018       Nicotine Usage:    No                Physical Exam   /89   Pulse 72   Resp 16   Ht 1.956 m (6' 5\")   Wt 146.5 kg (323 lb)   SpO2 95%   BMI 38.30 kg/m      Constitutional: Oriented to person, place, and time. Appears well-developed and well-nourished. Cooperative. No distress.   HENT:   Head: Normocephalic and atraumatic.   Eyes: Conjunctivae are normal.  Neck: Normal range of motion. Neck supple. No spinous process tenderness and no muscular tenderness present. No tracheal deviation present.  Cardiovascular: Normal rate and regular rhythm.    Pulmonary/Chest: Effort normal and breath sounds normal.  Abdominal: Soft. Bowel sounds are normal. Exhibits no distension. There is no tenderness.   Musculoskeletal:   Cervical flexion-extension range of motion: normal  Lumbar flexion/extension range of motion: limited to 15 degrees flexion due to pain    Neurological: alert and oriented to person, place, and time.   No cranial nerve deficit   sensory deficit entire plantar ball of feet and toes, circumferential calf  Gait antalgic      Reflex Scores:              Bicep reflexes are 2+ on the right side and 2+ on the left side.       Brachioradialis reflexes are 2+ on the right side and 2+ on the left side.       Patellar reflexes are 2+ on the right side and 2+ on the left side.       Achilles reflexes are 2+ on the right side and 2+ on the left side.    STRENGTH " LEFT RIGHT   Deltoid 5 5   Bicep 5 5   Wrist Extensor 5 5   Tricep 5 5   Finger flexion 5 5   Finger abduction 5 5    5 5       Hip Flexion     5     5   Knee Extension 5 5   Ankle Dorsiflexion 5 5   Extensor Hallucis Longus 5 5   Plantar Flexion 5 5   Foot eversion 5 5   Foot inversion 5 5     Sacroiliac Joint Exam LEFT RIGHT   Gera Finger Test + +   PSIS tenderness + +   ThighThrust + +                       Sacral Thrust + +       Skin: Skin is warm, dry and intact.   Psychiatric: Normal mood and affect. Speech is normal and behavior is normal.        ASSESSMENT:  Joel Pineda is a 44 year old male with obesity of BMI 38, ankylosing spondylitis, chronic back pain, and sacroiliac joint irritation    PLAN:     Patient's physical exam is not consistent with radicular pain, and neither is EMG based on prior Neurology consultation.  Unfortunately he has a lot of sacroiliac joint pain causing back pain and buttock pain, due to ankylosing spondylitis.  I encouraged him to continue his PT for this and recommended Aquatherapy for exercise to offload joint stress.    I do not have a surgical intervention that will make a meaningful difference in his pain.    I also encouraged weight loss to decrease axial spine load, and referred him to nutritionist to discuss this.    No scheduled followup needed with me.          Emily Obando MD    [facility] Department of Neurosurgery  Complex Spinal Deformity, Scoliosis, and Minimally Invasive Spine Surgery Specialist  Office: [phone]    10/31/2019    I spent 45 minutes in patient care with 30 minutes spent in counseling and/or coordination of care.

## 2019-10-31 ENCOUNTER — OFFICE VISIT (OUTPATIENT)
Dept: NEUROSURGERY | Facility: CLINIC | Age: 44
End: 2019-10-31
Payer: MEDICARE

## 2019-10-31 VITALS
HEIGHT: 77 IN | HEART RATE: 72 BPM | RESPIRATION RATE: 16 BRPM | BODY MASS INDEX: 37.19 KG/M2 | DIASTOLIC BLOOD PRESSURE: 89 MMHG | WEIGHT: 315 LBS | SYSTOLIC BLOOD PRESSURE: 137 MMHG | OXYGEN SATURATION: 95 %

## 2019-10-31 DIAGNOSIS — R45.851 SUICIDE IDEATION: ICD-10-CM

## 2019-10-31 DIAGNOSIS — E66.812 CLASS 2 DRUG-INDUCED OBESITY WITH SERIOUS COMORBIDITY AND BODY MASS INDEX (BMI) OF 38.0 TO 38.9 IN ADULT: ICD-10-CM

## 2019-10-31 DIAGNOSIS — M45.8 ANKYLOSING SPONDYLITIS OF SACRAL REGION (H): Primary | ICD-10-CM

## 2019-10-31 DIAGNOSIS — E66.1 CLASS 2 DRUG-INDUCED OBESITY WITH SERIOUS COMORBIDITY AND BODY MASS INDEX (BMI) OF 38.0 TO 38.9 IN ADULT: ICD-10-CM

## 2019-10-31 ASSESSMENT — PAIN SCALES - GENERAL: PAINLEVEL: MODERATE PAIN (5)

## 2019-10-31 ASSESSMENT — MIFFLIN-ST. JEOR: SCORE: 2462.5

## 2019-10-31 ASSESSMENT — PATIENT HEALTH QUESTIONNAIRE - PHQ9
SUM OF ALL RESPONSES TO PHQ QUESTIONS 1-9: 10
SUM OF ALL RESPONSES TO PHQ QUESTIONS 1-9: 10
10. IF YOU CHECKED OFF ANY PROBLEMS, HOW DIFFICULT HAVE THESE PROBLEMS MADE IT FOR YOU TO DO YOUR WORK, TAKE CARE OF THINGS AT HOME, OR GET ALONG WITH OTHER PEOPLE: SOMEWHAT DIFFICULT

## 2019-10-31 NOTE — ADDENDUM NOTE
Addended by: LEVAR MCADAMS on: 10/31/2019 10:42 AM     Modules accepted: Orders, Level of Service, SmartSet

## 2019-10-31 NOTE — PROGRESS NOTES
[facility]:  PHQ-9 Screening Note  SITUATION/BACKGROUND                                                    Joel Pineda is a 44 year old male who completed the PHQ-9 assessment for depression and Question 9 on the PHQ-9 was POSITIVE FOR SUICIDAL IDEATION.    Onset of symptoms: Pt has a long standing history of depression and anxiety which have been present since 6th grade. Pt stated that his mental stated is better than it has been in the past.  He has hope for the future and is studying to be a .  Trigger: Chronic pain (both physical and emotional) and isolation  Recent related events: No major losses.  Unemployment.  Shame.  Prior history of suicide attempt or self harm: No.  No history of suicidal attempts.  He does have a history of suicidal ideations and self injurious behavior.    Risk Factors: age, single status, anxiety, psychosis and comorbid medical condition of chronic pain  History of depression or mental illness: Yes.  Depression, anxiety, boarder line personality, bipolar per pt report  Medications reviewed: Yes     ASSESSMENT      A. Are any of the following present?      Suicidal thoughts with a plan and means to carry out the plan?    Intent to harm others    Altered mental status: confusion, delusional, psychotic NO - go to B.  Pt has a history of psychosis.  No history of suicidal plans or thoughts to harm others.   B. Are any of the following present?      Suicidal thoughts without a plan or means to carry out the plan    New onset of delusional ideas    Past inpatient admission for depression    New onset and recent change or addition of new medication YES.  Pt declined to see a provider in clinic.  Pt is treated at [facility] in the DBT program.  He meets with his individual therapist (Sapna Cavazos) once to twice a week and attends group once a week.      Pt has had multiple admission for depression and psychosis.  His most recent hospitalization was two years ago  "for psychosis and hallucinations.    Place referral to behavioral health team for \"regular\" follow-up.   C. Are any of the following present?      Previous suicide attempts    Depression interfering with ability to work or function    Loss of appetite and eating poorly    Abrupt cessation of drugs (OTC or RX), alcohol or caffeine    Drug or alcohol abuse YES -  Provide patient with crisis resources.     Place referral to behavioral health team for \"regular\" follow-up   D. Are several of the following present?      Difficulty concentrating    Difficulty sleeping    Reduced interest in sexual activity or impotency    Irregular or absent menstruation    No interest in activity    Change in interpersonal relationships    Increased use/abuse of alcohol or drugs    Pregnant or recent child birth    Recent major life change    History of depression YES -  Follow-up with mental health care provider.  Pt has an appointment today 10/31/2019 at 1400.    History of depression and difficulty concentrating.     Place referral to behavioral health team for \"regular\" follow-up.         PLAN      Home Care Instructions:   If currently in counseling, call counselor for appointment  Call local crisis interventions  Contact friends or family for support  Increase exercise and enjoyable activities  East a well-balanced diet, drink plenty of fluids and rest as needed  Alcohol and other recreational drugs can worsen depression.  If heavy use of drugs or alcohol, contact counselor or PCP to help reduce consumption.    Report the following to your PCP:   Suicidal thoughts without a plan or means to carry out the plan  Depression interferes with daily activities  Persistent inability to sleep    Seek emergency care immediately if any of the following occur:   Suicidal thoughts and plan and means to carry out the plan  Injury to self or others  Altered mental status:  Pyschotic    BEHAVIORAL HEALTH TEAMS      [facility] - Behavioral Health " Team    [facility] Pager: [phone]    [place]  - Behavioral Health Team    Pager number: [phone]    Referral to Behavioral Health   [facility] SOWQ [[id number]}    RESOURCES      - 24/7 Crisis Hotlines: National Suicide Prevention Hotline  355-167-RMPT (0589)  - Crisis Hotlines by County:  [facility]: 112.926.6638  - Urgent Care for Adult Mental Health:  [phone]  (24 hours a day)  [address], [place] [address]  DROP IN:  Monday - Friday: 8:00 am - 7:00 pm  Saturday: 11:00 am - 3:00 pm   Sunday and Holidays: Closed  - Walk-In Centers and Mobile Teams:  [facility] ANU (18+) Crisis Mobile Team: [phone]  - Walk-In Counseling Center:  [phone]  [address] [place] [address]  Hours:  M, W, F:  1:00-3:00PM      M-Th:  6:30-8:30PM  - Pt stated that [facility] has a coaching line in which he can speak with a therapist 24 hours a day.   Pt states that his mother and brother (both local) are a good support system.  In addition, pt is training to be a  and volunteers.    PEMA CRISTOBAL, RN        Copyright 2016 [employer]

## 2019-10-31 NOTE — LETTER
"10/31/2019       RE: Joel Pineda  [address] [place] [address]     Dear Colleague,    Thank you for referring your patient, Joel Pineda, to the [facility] NEUROSURGERY at [facility]. Please see a copy of my visit note below.      Neurosurgery Clinic Note    Chief Complaint: back pain    History of Present Illness:  It was a pleasure to evaluate Joel Pineda in clinic today       Joel Pineda is a 44 year old male with mood disorder and ankylosing spondylitis, on Humira, presenting with chronic low back pain and buttock pain, and sensation of numbness in entire feet and calves.   Back pain is worst with sitting. Relieved minorly by changing positions. Rarely has radiating pain down legs, only when having \"severe flare\" of pain.    EMG by Dr. Santamaria in 3/2019 did not show any radiculopathy; did show peripheral neuropathy  Neurology note by DR. Malone indicated likely diabetic neuropathy contributing.    Prior L5-S1 microdiskectomy in 2007 at outside hospital- he says within 8 weeks of this he was in \"agonizing pain\" again and does not feel it benefitted him overall.     Of note, in the past 3 months alone, patient has been seen in ER and made multiple calls to physicians' offices for issues ranging from big toe infection from filing his nails, diarrhea, abdominal pain, and headache. He continues unfortunately to be suffering from pain in his toe at site of injury from nail file and is being treated for this.          Review of Systems    Answers for HPI/ROS submitted by the patient on 10/17/2019   General Symptoms: Yes  Skin Symptoms: Yes  HENT Symptoms: Yes  EYE SYMPTOMS: No  HEART SYMPTOMS: No  LUNG SYMPTOMS: No  INTESTINAL SYMPTOMS: Yes  URINARY SYMPTOMS: No  REPRODUCTIVE SYMPTOMS: No  SKELETAL SYMPTOMS: Yes  BLOOD SYMPTOMS: No  NERVOUS SYSTEM SYMPTOMS: Yes  MENTAL HEALTH SYMPTOMS: Yes  Fever: No  Loss of appetite: No  Weight loss: No  Weight gain: " No  Fatigue: Yes  Night sweats: No  Chills: No  Increased stress: Yes  Excessive hunger: No  Excessive thirst: No  Feeling hot or cold when others believe the temperature is normal: Yes  Loss of height: No  Post-operative complications: No  Surgical site pain: No  Hallucinations: No  Change in or Loss of Energy: Yes  Hyperactivity: Yes  Confusion: No  Changes in hair: No  Changes in moles/birth marks: No  Itching: No  Rashes: No  Changes in nails: Yes  Acne: No  Change in facial hair: No  Warts: No  Non-healing sores: No  Scarring: No  Flaking of skin: No  Color changes of hands/feet in cold : No  Sun sensitivity: No  Skin thickening: No  Ear pain: No  Ear discharge: No  Hearing loss: No  Tinnitus: No  Nosebleeds: No  Congestion: Yes  Sinus pain: No  Trouble swallowing: No   Voice hoarseness: No  Mouth sores: No  Sore throat: No  Tooth pain: No  Gum tenderness: No  Bleeding gums: No  Change in taste: No  Change in sense of smell: No  Dry mouth: No  Hearing aid used: No  Neck lump: No  Heart burn or indigestion: No  Nausea: Yes  Vomiting: No  Abdominal pain: No  Bloating: No  Constipation: No  Diarrhea: Yes  Blood in stool: No  Black stools: No  Rectal or Anal pain: No  Fecal incontinence: No  Yellowing of skin or eyes: No  Vomit with blood: No  Change in stools: No  Back pain: Yes  Muscle aches: Yes  Neck pain: Yes  Swollen joints: No  Joint pain: Yes  Bone pain: No  Muscle cramps: No  Muscle weakness: Yes  Joint stiffness: Yes  Bone fracture: No  Trouble with coordination: No  Dizziness or trouble with balance: No  Fainting or black-out spells: No  Memory loss: Yes  Headache: Yes  Seizures: No  Speech problems: No  Tingling: No  Tremor: No  Weakness: No  Difficulty walking: Yes  Paralysis: No  Numbness: Yes  Nervous or Anxious: Yes  Depression: Yes  Trouble sleeping: No  Trouble thinking or concentrating: Yes  Mood changes: Yes  Panic attacks: No      Past Medical History:   Diagnosis Date     Acne      Allergic  state      Anxiety      Bipolar 2 disorder (H)      Chest pain     Chest pain, regulated w/BP meds. Clear arteries.     Chronic pain      Depressive disorder      Diabetes (H)      Diverticulosis      Gastroesophageal reflux disease      Hypertension      IBS (irritable bowel syndrome)      Intracranial arachnoid cyst      Polyneuropathy      Pulmonary embolism (H)      Skin exam, screening for cancer 12/3/2013     Sleep apnea      Uncomplicated asthma        Past Surgical History:   Procedure Laterality Date     BACK SURGERY  10/07    lumbar discectomy L5-S1     COLONOSCOPY      Note: colonoscopy scheduled with [facility] on Friday, 9/4/15     COSMETIC SURGERY  2012    Nose Exterior - functional     GI SURGERY  August 2013    Sigmoidectomy     HERNIA REPAIR, UMBILICAL  8/23/11    small, Dr. Evan Beavers     INCISION AND DRAINAGE, ABSCESS, COMPLEX  8/23/11    umbilical, Dr. Evan Beavers     LAPAROSCOPIC ASSISTED COLECTOMY LEFT (DESCENDING)  8/15/2013    Procedure: LAPAROSCOPIC ASSISTED COLECTOMY LEFT (DESCENDING);  Laparoscopic Hand Assisted Sigmoid Resection, Mobilization of Splenic Fissure, coloproctoscopy, *Latex Free Room* Anesthesia General with Pain block  ;  Surgeon: Aurora Justice MD;  Location: [facility] OR     NERVE SURGERY  8/18/11    RF ablation @ L3-S1 @ [facility]     RECONSTRUCT NOSE AND SEPTUM (FUNCTIONAL)  10/14/2011    Procedure:RECONSTRUCT NOSE AND SEPTUM (FUNCTIONAL); Functional Septorhinoplasty, Turbinate Reduction, ; Surgeon:CEDRIC CUEVAS; Location:[facility] OR     SINUS SURGERY  10/1/01    ethmoidectomy chronic sinusitis       Social History     Socioeconomic History     Marital status: Single     Spouse name: Not on file     Number of children: Not on file     Years of education: Not on file     Highest education level: Not on file   Occupational History     Occupation:      Employer: [employer]     Occupation: paraprofessional     Comment: [employer]     Employer: [employer]   Social Needs      Financial resource strain: Not on file     Food insecurity:     Worry: Not on file     Inability: Not on file     Transportation needs:     Medical: Not on file     Non-medical: Not on file   Tobacco Use     Smoking status: Never Smoker     Smokeless tobacco: Never Used   Substance and Sexual Activity     Alcohol use: Yes     Alcohol/week: 0.0 standard drinks     Drinks per session: 1 or 2     Binge frequency: Weekly     Comment: occ 1/week     Drug use: No     Sexual activity: Never     Partners: Female   Lifestyle     Physical activity:     Days per week: Not on file     Minutes per session: Not on file     Stress: Not on file   Relationships     Social connections:     Talks on phone: Not on file     Gets together: Not on file     Attends Caodaism service: Not on file     Active member of club or organization: Not on file     Attends meetings of clubs or organizations: Not on file     Relationship status: Not on file     Intimate partner violence:     Fear of current or ex partner: Not on file     Emotionally abused: Not on file     Physically abused: Not on file     Forced sexual activity: Not on file   Other Topics Concern     Parent/sibling w/ CABG, MI or angioplasty before 65F 55M? No      Service Not Asked     Blood Transfusions Not Asked     Caffeine Concern Not Asked     Occupational Exposure Not Asked     Hobby Hazards Not Asked     Sleep Concern Yes     Stress Concern Yes     Weight Concern Not Asked     Special Diet Not Asked     Back Care Yes     Exercise Not Asked     Bike Helmet Not Asked     Seat Belt Yes     Self-Exams Not Asked   Social History Narrative     Not on file       family history includes Anxiety Disorder in his mother and sister; Breast Cancer in his mother; Colon Polyps in his mother; Depression in his brother, father, mother, paternal grandfather, and sister; Diabetes in his mother; Heart Disease in his maternal grandfather and maternal grandmother; Hyperlipidemia in his  father; Hypertension in his father and mother; Musculoskeletal Disorder in his brother, father, and mother; Obesity in his mother; Osteoporosis in his mother; Other Cancer in an other family member; Psychotic Disorder in his paternal grandfather; Substance Abuse in his brother, father, and sister; Suicide in his paternal grandfather; Thyroid Disease in his mother; Ulcerative Colitis in his mother.        IMAGING per my own measurement and interpretation:  xrays- partial ankylosis of SI joints        Us Jaqueline Doppler With Exercise Bilateral    Result Date: 4/10/2019  PROCEDURE: JAQUELINE with Doppler Waveforms, segmental pressures, and JAQUELINE exercise of the bilateral lower extremities DATE OF PROCEDURE: 4/10/2019 1:31 PM CLINICAL HISTORY/INDICATION: 43-year-old diabetic male with intermittent pain/cramping in his legs during walking. COMPARISON: None relevant TECHNIQUE: Resting and exercise ankle branchial indices and waveform analysis of the bilateral lower extremities FINDINGS: The patient walked on a treadmill for 5 minutes at a 10% incline at 1.5 mph.  The patient had mild left distal calf pain at 45 seconds and bilateral thigh pain at 2 minutes and 15 seconds.. The resting and exercise ABIs on the right are 1.23 and 1.24 respectively The resting and exercise ABIs on the left are 1.26 and 1.26 respectively Resting ABIs on the right 1.23 with multiphasic waveforms within the posterior tibial artery. Monophasic waveforms within the dorsalis pedis. Normal digital waveforms. Resting ABIs on the right 1.26 with multiphasic waveforms in the dorsalis pedis and posterior tibial distribution as well as normal digital waveforms.     IMPRESSION: Normal resting and exercise ABIs bilaterally. Diminished right dorsalis pedis waveforms suggesting anterior tibial stenosis however normal digital waveforms on the right. JAQUELINE Diagnostic Criteria   >/= 1.3          Non compressible  0.95 - 1.29     Normal  0.90 - 0.94     Mild PAD  0.50 - 0.89      Moderate PAD  0.20 - 0.49     Severe PAD  < 0.20             Critical PAD ORLY HERNANDEZ MD      Result Date: 9/17/2019  XR LUMBAR SPINE FOUR OR MORE VIEWS   9/17/2019 1:04 PM HISTORY: Evaluate, lumbar radiculopathy.  COMPARISON: None.     IMPRESSION: Minimal anterior osteophytes are seen at the thoracolumbar junction. Posterior alignment is normal. No evidence for fracture. JOSELINE SHEPPARD MD    Xr Sacroiliac Joint 1/2 Views    Result Date: 2/28/2018  XR SACROILIAC JOINT 1/2 VW 2/28/2018 12:26 PM HISTORY: Ankylosing spondylitis. COMPARISON: None.     IMPRESSION: Normal bilateral sacroiliac joints. ENMA HUNG MD    Mr Lumbar Spine W/o Contrast    Result Date: 6/27/2019  MR LUMBAR SPINE W/O CONTRAST 6/27/2019 11:44 AM Provided History: Radiculopathy, > 6wks conservative tx, persistent sx; ankylosing spondylitis.  New right foot drop/weakness, intermittent with activity.  H/o epidural steroid injection >2 weeks ago.  No fixed symptoms.; Lumbar radiculopathy ICD-10: Lumbar radiculopathy Comparison: Lumbar spine MRI 12/7/2016 Technique: Sagittal T1-weighted, sagittal STIR, 3D volumetric axial and sagittal reconstructed T2-weighted images of the lumbar spine were obtained without intravenous contrast. Findings: There are 5 lumbar-type vertebrae assumed for the purposes of this dictation.  The tip of the conus medullaris is at L1-L2. Normal lumbar vertebral alignment.  There is multilevel disc height narrowing , most pronounced at L3-S1.  Normal marrow signal. There are degenerative changes the superior endplate of L5. On a level by level basis: T12-L1: No spinal canal or neuroforaminal stenosis. L1-2: No spinal canal or neuroforaminal stenosis. L2-3: No spinal canal or neuroforaminal stenosis. L3-4: Mild broad-based disc bulge with a central annular fissure and superimposed central superiorly migrating disc extrusion. Mild bilateral facet arthropathy. Mild neural foraminal stenosis bilaterally. Mild canal is patent.  L4-5: Broad-based disc bulge with superimposed central inferiorly migrating disc extrusion. Mild bilateral facet arthropathy. Mild bilateral neural foraminal stenosis. Spinal canal is patent. L5-S1: Broad-based disc bulge with superimposed central extrusion inferiorly that abuts the traversing left S1 nerve root without evidence for impingement. Facet arthropathy bilaterally. Moderate to severe left and moderate right neural foraminal stenosis. Spinal canal is patent. Paraspinous tissues are within normal limits.     Impression: 1. Multilevel lumbar spondylosis, most pronounced at L5-S1 with moderate to severe left and moderate right neural foraminal stenosis as well as narrowing of the left lateral recess and abutment the traversing left S1 nerve root. 2. Additional multilevel degenerative changes of the lumbar spine as described above. I have personally reviewed the examination and initial interpretation and I agree with the findings. ORLY MCRAE MD      Vitamin D:  Vitamin D Deficiency Screening Results:  Lab Results   Component Value Date    VITDT 40 09/13/2019    VITDT 38 01/03/2019    VITDT 30 05/03/2017    VITDT 34 02/08/2016    VITDT 44 01/25/2016     25 OH Vit D2   Date Value Ref Range Status   09/26/2011 <5 ug/L Final   02/17/2011 <5 ug/L Final   08/28/2010 7 ug/L Final     25 OH Vit D3   Date Value Ref Range Status   09/26/2011 38 ug/L Final   02/17/2011 38 ug/L Final   08/28/2010 37 ug/L Final     25 OH Vit D total   Date Value Ref Range Status   09/26/2011  30 - 75 ug/L Final    <43  Season, race, dietary intake, and treatment affect the concentration of   25-hydroxy-Vitamin D. Values may decrease during winter months and increase   during summer months. Values less than 30 ug/L may indicate Vitamin D   deficiency.   02/17/2011  30 - 75 ug/L Final    <43  Season, race, dietary intake, and treatment affect the concentration of   25-hydroxy-Vitamin D. Values may decrease during winter months and  "increase   during summer months. Values less than 30 ug/L may indicate Vitamin D   deficiency.   08/28/2010 44 30 - 75 ug/L Final     Comment:     Season, race, dietary intake, and treatment affect the concentration of   25-hydroxy-Vitamin D. Values may decrease during winter months and increase   during summer months. Values less than 30 ug/L may indicate Vitamin D   deficiency.         Nutritional Status:  Estimated body mass index is 27.39 kg/m  as calculated from the following:    Height as of 10/8/19: 1.956 m (6' 5\").    Weight as of 10/23/19: 104.8 kg (231 lb).    Lab Results   Component Value Date    ALBUMIN 4.3 09/26/2019       Diabetes Screening:  Lab Results   Component Value Date    A1C 5.9 09/13/2019    A1C 6.1 03/19/2019    A1C 6.7 12/24/2018    A1C 5.8 05/01/2018    A1C 5.8 01/18/2018       Nicotine Usage:    No                Physical Exam   /89   Pulse 72   Resp 16   Ht 1.956 m (6' 5\")   Wt 146.5 kg (323 lb)   SpO2 95%   BMI 38.30 kg/m       Constitutional: Oriented to person, place, and time. Appears well-developed and well-nourished. Cooperative. No distress.   HENT:   Head: Normocephalic and atraumatic.   Eyes: Conjunctivae are normal.  Neck: Normal range of motion. Neck supple. No spinous process tenderness and no muscular tenderness present. No tracheal deviation present.  Cardiovascular: Normal rate and regular rhythm.    Pulmonary/Chest: Effort normal and breath sounds normal.  Abdominal: Soft. Bowel sounds are normal. Exhibits no distension. There is no tenderness.   Musculoskeletal:   Cervical flexion-extension range of motion: normal  Lumbar flexion/extension range of motion: limited to 15 degrees flexion due to pain    Neurological: alert and oriented to person, place, and time.   No cranial nerve deficit   sensory deficit entire plantar ball of feet and toes, circumferential calf  Gait antalgic      Reflex Scores:              Bicep reflexes are 2+ on the right side and 2+ on " the left side.       Brachioradialis reflexes are 2+ on the right side and 2+ on the left side.       Patellar reflexes are 2+ on the right side and 2+ on the left side.       Achilles reflexes are 2+ on the right side and 2+ on the left side.    STRENGTH LEFT RIGHT   Deltoid 5 5   Bicep 5 5   Wrist Extensor 5 5   Tricep 5 5   Finger flexion 5 5   Finger abduction 5 5    5 5       Hip Flexion     5     5   Knee Extension 5 5   Ankle Dorsiflexion 5 5   Extensor Hallucis Longus 5 5   Plantar Flexion 5 5   Foot eversion 5 5   Foot inversion 5 5     Sacroiliac Joint Exam LEFT RIGHT   Gera Finger Test + +   PSIS tenderness + +   ThighThrust + +                       Sacral Thrust + +       Skin: Skin is warm, dry and intact.   Psychiatric: Normal mood and affect. Speech is normal and behavior is normal.        ASSESSMENT:  Joel Pineda is a 44 year old male with obesity of BMI 38, ankylosing spondylitis, chronic back pain, and sacroiliac joint irritation    PLAN:     Patient's physical exam is not consistent with radicular pain, and neither is EMG based on prior Neurology consultation.  Unfortunately he has a lot of sacroiliac joint pain causing back pain and buttock pain, due to ankylosing spondylitis.  I encouraged him to continue his PT for this and recommended Aquatherapy for exercise to offload joint stress.    I do not have a surgical intervention that will make a meaningful difference in his pain.    I also encouraged weight loss to decrease axial spine load, and referred him to nutritionist to discuss this.    No scheduled followup needed with me.    Emily Obando MD    [facility] Department of Neurosurgery  Complex Spinal Deformity, Scoliosis, and Minimally Invasive Spine Surgery Specialist  Office: [phone]    10/31/2019    I spent 45 minutes in patient care with 30 minutes spent in counseling and/or coordination of care.    [facility] Health:  PHQ-9  Screening Note  SITUATION/BACKGROUND                                                    Joel Pineda is a 44 year old male who completed the PHQ-9 assessment for depression and Question 9 on the PHQ-9 was POSITIVE FOR SUICIDAL IDEATION.    Onset of symptoms: Pt has a long standing history of depression and anxiety which have been present since 6th grade. Pt stated that his mental stated is better than it has been in the past.  He has hope for the future and is studying to be a .  Trigger: Chronic pain (both physical and emotional) and isolation  Recent related events: No major losses.  Unemployment.  Shame.  Prior history of suicide attempt or self harm: No.  No history of suicidal attempts.  He does have a history of suicidal ideations and self injurious behavior.    Risk Factors: age, single status, anxiety, psychosis and comorbid medical condition of chronic pain  History of depression or mental illness: Yes.  Depression, anxiety, boarder line personality, bipolar per pt report  Medications reviewed: Yes     ASSESSMENT      A. Are any of the following present?      Suicidal thoughts with a plan and means to carry out the plan?    Intent to harm others    Altered mental status: confusion, delusional, psychotic NO - go to B.  Pt has a history of psychosis.  No history of suicidal plans or thoughts to harm others.   B. Are any of the following present?      Suicidal thoughts without a plan or means to carry out the plan    New onset of delusional ideas    Past inpatient admission for depression    New onset and recent change or addition of new medication YES.  Pt declined to see a provider in clinic.  Pt is treated at [facility] in the DBT program.  He meets with his individual therapist (Sapna Cavazos) once to twice a week and attends group once a week.      Pt has had multiple admission for depression and psychosis.  His most recent hospitalization was two years ago for psychosis and  "hallucinations.    Place referral to behavioral health team for \"regular\" follow-up.   C. Are any of the following present?      Previous suicide attempts    Depression interfering with ability to work or function    Loss of appetite and eating poorly    Abrupt cessation of drugs (OTC or RX), alcohol or caffeine    Drug or alcohol abuse YES -  Provide patient with crisis resources.     Place referral to behavioral health team for \"regular\" follow-up   D. Are several of the following present?      Difficulty concentrating    Difficulty sleeping    Reduced interest in sexual activity or impotency    Irregular or absent menstruation    No interest in activity    Change in interpersonal relationships    Increased use/abuse of alcohol or drugs    Pregnant or recent child birth    Recent major life change    History of depression YES -  Follow-up with mental health care provider.  Pt has an appointment today 10/31/2019 at 1400.    History of depression and difficulty concentrating.     Place referral to behavioral health team for \"regular\" follow-up.         PLAN      Home Care Instructions:   If currently in counseling, call counselor for appointment  Call local crisis interventions  Contact friends or family for support  Increase exercise and enjoyable activities  East a well-balanced diet, drink plenty of fluids and rest as needed  Alcohol and other recreational drugs can worsen depression.  If heavy use of drugs or alcohol, contact counselor or PCP to help reduce consumption.    Report the following to your PCP:   Suicidal thoughts without a plan or means to carry out the plan  Depression interferes with daily activities  Persistent inability to sleep    Seek emergency care immediately if any of the following occur:   Suicidal thoughts and plan and means to carry out the plan  Injury to self or others  Altered mental status:  Pyschotic    BEHAVIORAL HEALTH TEAMS      [facility] - Behavioral Health Team    [facility] Pager: " [phone]    [facility]  - Behavioral Health Team    Pager number: [phone]    Referral to Behavioral Health   [facility] SOWQ [51791}    RESOURCES      - 24/7 Crisis Hotlines: National Suicide Prevention Hotline  236-298-RYJN (2326)  - Crisis Hotlines by County:  [place] ANU for Adults: 357.865.9373  - Urgent Care for Adult Mental Health:  [phone]  (24 hours a day)  [address], [place] [address]  DROP IN:  Monday - Friday: 8:00 am - 7:00 pm  Saturday: 11:00 am - 3:00 pm   Sunday and Holidays: Closed  - Walk-In Centers and Mobile Teams:  [facility] ANU (18+) Crisis Mobile Team: [phone]  - Walk-In Counseling Center:  [phone]  [address] [place] [address]  Hours:  M, W, F:  1:00-3:00PM      M-Th:  6:30-8:30PM  - Pt stated that [facility] has a coaching line in which he can speak with a therapist 24 hours a day.   Pt states that his mother and brother (both local) are a good support system.  In addition, pt is training to be a  and volunteers.    PEMA CRISTOBAL, ESTEFANI    Copyright 2016 [facility]

## 2019-10-31 NOTE — NURSING NOTE
Chief Complaint   Patient presents with     Back Pain     [facility] NEW- LUMBAR RADICULOPATHY     Depression Response    Patient completed the PHQ-9 assessment for depression and scored >9? Yes  Question 9 on the PHQ-9 was positive for suicidality? Yes  Is the patient already receiving treatment for depression? Yes  Patient would like to speak with behavioral health team ([facility] clinics only)? No    I personally notified the following: clinic nurse    Behavioral Health/Social Work Contact Information     [facility]  Rogelio Crespo MA, LMFT  Lead Behavioral Health Clinician  Phone: [phone]  [facility] Pager: [phone]    Non-[facility]  [facility] On-Call   Pager: [phone]         Frantz Good, EMT

## 2019-11-02 ASSESSMENT — PATIENT HEALTH QUESTIONNAIRE - PHQ9: SUM OF ALL RESPONSES TO PHQ QUESTIONS 1-9: 10

## 2019-11-04 ENCOUNTER — MYC REFILL (OUTPATIENT)
Dept: FAMILY MEDICINE | Facility: CLINIC | Age: 44
End: 2019-11-04

## 2019-11-04 DIAGNOSIS — G89.4 CHRONIC PAIN SYNDROME: Chronic | ICD-10-CM

## 2019-11-04 DIAGNOSIS — M51.369 DDD (DEGENERATIVE DISC DISEASE), LUMBAR: ICD-10-CM

## 2019-11-04 DIAGNOSIS — M45.8 ANKYLOSING SPONDYLITIS OF SACRAL REGION (H): ICD-10-CM

## 2019-11-05 RX ORDER — OXYCODONE HYDROCHLORIDE 5 MG/1
5-10 TABLET ORAL EVERY 6 HOURS PRN
Qty: 35 TABLET | Refills: 0 | Status: SHIPPED | OUTPATIENT
Start: 2019-11-05 | End: 2019-11-24

## 2019-11-05 NOTE — TELEPHONE ENCOUNTER
Requested Prescriptions   Pending Prescriptions Disp Refills     oxyCODONE (ROXICODONE) 5 MG tablet       Last Written Prescription Date:  10/16/19  Last Fill Quantity: 35 tablet,   # refills: 0  Last Office Visit: Dr. Lyles  Future Office visit:    Next 5 appointments (look out 90 days)    Dec 12, 2019 10:00 AM CST  Return Visit with Elaine Segovia MD  [facility] [address] [place] [id number] [phone]           Routing refill request to provider for review/approval because:  Drug not on the [facility], P or [facility] refill protocol or controlled substance   35 tablet 0     Sig: Take 1-2 tablets (5-10 mg) by mouth every 6 hours as needed for pain (maximum 4 tablet(s) per day)       There is no refill protocol information for this order

## 2019-11-05 NOTE — TELEPHONE ENCOUNTER
Controlled Substance Refill Request for Oxycodone  Problem List Complete:  Yes    Patient is followed by Ksenia Lyles MD for ongoing prescription of pain medication.  All refills should only be approved by this provider, or covering partner.    Medication(s): oxy 5.   Maximum quantity per month: 40  Clinic visit frequency required: Q3  months     Controlled substance agreement: 6/12/19  Encounter-Level CSA - 04/04/2017:          Controlled Substance Agreement - Scan on 4/11/2017  1:30 PM : CONTROLLED SUBSTANCE AGREEMENT (below)              Pain Clinic evaluation in the past: Yes    DIRE Total Score(s):  No flowsheet data found.    Last [facility] website verification:  11/5/19    Sapna Barragan RN, [facility]

## 2019-11-06 ENCOUNTER — MYC MEDICAL ADVICE (OUTPATIENT)
Dept: FAMILY MEDICINE | Facility: CLINIC | Age: 44
End: 2019-11-06

## 2019-11-06 ENCOUNTER — OFFICE VISIT (OUTPATIENT)
Dept: FAMILY MEDICINE | Facility: CLINIC | Age: 44
End: 2019-11-06
Payer: MEDICARE

## 2019-11-06 VITALS
HEART RATE: 68 BPM | RESPIRATION RATE: 20 BRPM | HEIGHT: 77 IN | TEMPERATURE: 98.3 F | OXYGEN SATURATION: 98 % | DIASTOLIC BLOOD PRESSURE: 80 MMHG | WEIGHT: 315 LBS | SYSTOLIC BLOOD PRESSURE: 115 MMHG | BODY MASS INDEX: 37.19 KG/M2

## 2019-11-06 DIAGNOSIS — G89.4 CHRONIC PAIN SYNDROME: Chronic | ICD-10-CM

## 2019-11-06 DIAGNOSIS — M54.50 CHRONIC MIDLINE LOW BACK PAIN WITHOUT SCIATICA: Chronic | ICD-10-CM

## 2019-11-06 DIAGNOSIS — M54.2 NECK PAIN: ICD-10-CM

## 2019-11-06 DIAGNOSIS — G62.9 NEUROPATHY: Primary | ICD-10-CM

## 2019-11-06 DIAGNOSIS — G89.29 CHRONIC MIDLINE LOW BACK PAIN WITHOUT SCIATICA: Chronic | ICD-10-CM

## 2019-11-06 PROCEDURE — 99214 OFFICE O/P EST MOD 30 MIN: CPT | Performed by: NURSE PRACTITIONER

## 2019-11-06 RX ORDER — PREGABALIN 150 MG/1
150 CAPSULE ORAL 3 TIMES DAILY
Qty: 90 CAPSULE | Refills: 5 | Status: SHIPPED | OUTPATIENT
Start: 2019-11-06 | End: 2019-11-15

## 2019-11-06 ASSESSMENT — MIFFLIN-ST. JEOR: SCORE: 2457.96

## 2019-11-06 ASSESSMENT — PAIN SCALES - GENERAL: PAINLEVEL: MODERATE PAIN (4)

## 2019-11-06 NOTE — PROGRESS NOTES
DME ordered faxed.    Spoke with pt, he will send me a fax number via [employer] to send Lyrica Rx to.    Cheryl AGUILAR, Patient Care

## 2019-11-06 NOTE — Clinical Note
Please fax the Lyrica prescription to [employer].  Uncertain of fax number but phone numbers from patient were [phone] or [phone] fax the DME order for lumbosacral brace toFax: [phone] Jing umaña APRN, NP-[facility]

## 2019-11-06 NOTE — PROGRESS NOTES
Subjective     Joel Pineda is a 44 year old male who presents to clinic today for the following health issues:    HPI   Chronic/Recurring Back Pain Follow Up      Where is your back pain located? (Select all that apply) low back bilateral, neck  and feet    How would you describe your back pain?  dull ache    Where does your back pain spread? SI joint pain    Since your last clinic visit for back pain, how has your pain changed? unchanged    Does your back pain interfere with your job? Not applicable    Since your last visit, have you tried any new treatment? Yes -  Orthology PT  He feels this is helping.    How many servings of fruits and vegetables do you eat daily?  2-3    On average, how many sweetened beverages do you drink each day (soda, juice, sweet tea, etc)?   0    How many days per week do you miss taking your medication? 0    He feels his mood is stable.  Some days he has some suicidal ideation but no plan to hurt himself.  Denies self-injurious behavior.  Sees his therapist twice a week, is in DBT therapy.  He feels his mood is stable.  It comes and goes with his chronic pain.    Phone number [employer]: [phone] or [phone] patient states last time the Lyrica was faxed directly to [employer].-We will send the phone numbers to the staff to call and get the fax number.     Fax: [phone]  Lumbosacral belt/brace-need to fax an order to this number.  He states he has one but it is to send according to physical therapy he recently saw the neurosurgeon who said he did not need surgery.  He was happy to hear this.  As he was not completely interested in having surgery.    Using lidocaine patches for neck   Separate referral for neck PT he is having ongoing neck pain, would like a separate order for that.-      Patient Active Problem List   Diagnosis     Major depressive disorder, recurrent episode (H)     Intermittent asthma     Hyperlipidemia LDL goal <100     Chronic nonallergic rhinitis      Diverticulosis     GERD (gastroesophageal reflux disease)     Anxiety     LLOYD (obstructive sleep apnea)- mild (AHI 11)     Intracranial arachnoid cyst     Facet arthritis of cervical region     Acquired hypothyroidism     Bipolar 2 disorder (H)     Chronic midline low back pain without sciatica     Irritable bowel syndrome with diarrhea     B12 deficiency     Essential hypertension with goal blood pressure less than 140/90     Chronic pain syndrome     Ankylosing spondylitis of sacral region (H)     Morbid obesity due to hypertriglyceridemia (H)     Fatty infiltration of liver     DDD (degenerative disc disease), lumbar     Type 2 diabetes mellitus with complication, without long-term current use of insulin (H)     Peripheral polyneuropathy     History of pulmonary embolism     Ingrown toenail     Past Surgical History:   Procedure Laterality Date     BACK SURGERY  10/07    lumbar discectomy L5-S1     COLONOSCOPY      Note: colonoscopy scheduled with [facility] on Friday, 9/4/15     COSMETIC SURGERY  2012    Nose Exterior - functional     GI SURGERY  August 2013    Sigmoidectomy     HERNIA REPAIR, UMBILICAL  8/23/11    smallDr. Evan     INCISION AND DRAINAGE, ABSCESS, COMPLEX  8/23/11    umbilical, Dr. Evan Beavers     LAPAROSCOPIC ASSISTED COLECTOMY LEFT (DESCENDING)  8/15/2013    Procedure: LAPAROSCOPIC ASSISTED COLECTOMY LEFT (DESCENDING);  Laparoscopic Hand Assisted Sigmoid Resection, Mobilization of Splenic Fissure, coloproctoscopy, *Latex Free Room* Anesthesia General with Pain block  ;  Surgeon: Aurora Justice MD;  Location: [facility] OR     NERVE SURGERY  8/18/11    RF ablation @ L3-S1 @ [facility]     RECONSTRUCT NOSE AND SEPTUM (FUNCTIONAL)  10/14/2011    Procedure:RECONSTRUCT NOSE AND SEPTUM (FUNCTIONAL); Functional Septorhinoplasty, Turbinate Reduction, ; Surgeon:CEDRIC CUEVAS; Location:[facility] OR     SINUS SURGERY  10/1/01    ethmoidectomy chronic sinusitis       Social History     Tobacco Use     Smoking  "status: Never Smoker     Smokeless tobacco: Never Used   Substance Use Topics     Alcohol use: Yes     Alcohol/week: 0.0 standard drinks     Drinks per session: 1 or 2     Binge frequency: Weekly     Comment: occ 1/week     Family History   Problem Relation Age of Onset     Musculoskeletal Disorder Mother         back     Anxiety Disorder Mother      Colon Polyps Mother      Ulcerative Colitis Mother         and ischemic small intestine, surgery     Hypertension Mother      Breast Cancer Mother      Osteoporosis Mother      Diabetes Mother         Type 2, Diagnosed in 2014     Depression Mother         Takes Cymbalta to help with chronic pain + depx     Thyroid Disease Mother         Hypothyroidism     Obesity Mother         Under much better control latter half of 2015     Musculoskeletal Disorder Father         back     Substance Abuse Father      Hypertension Father      Hyperlipidemia Father      Depression Father         Off meds for many years. Seems \"ok\"     Heart Disease Maternal Grandmother      Heart Disease Maternal Grandfather      Psychotic Disorder Paternal Grandfather      Suicide Paternal Grandfather      Depression Paternal Grandfather         Pediatrician. Committed suicide by pistol in 1990.     Musculoskeletal Disorder Brother         back     Depression Brother         Expressed as anger and moodiness     Substance Abuse Sister      Depression Sister         Mental Health Therapist, yet no anti-depressants?     Anxiety Disorder Sister         Mental Health Therapist, yet no anti-anxiety meds?     Other Cancer Other         Bladder Cancer - Fatal     Substance Abuse Brother      Colon Cancer No family hx of      Crohn's Disease No family hx of      Anesthesia Reaction No family hx of      Cancer No family hx of         No family history of skin cancer         Current Outpatient Medications   Medication Sig Dispense Refill     acetaminophen 500 MG CAPS Take 500 mg by mouth every 4 hours as needed " 60 capsule      adalimumab (HUMIRA *CF*) 40 MG/0.4ML pen kit Inject 0.4 mLs (40 mg) Subcutaneous every 14 days . Hold for signs of infection, then seek medical attention. 2.4 mL 3     albuterol (PROAIR HFA/PROVENTIL HFA/VENTOLIN HFA) 108 (90 Base) MCG/ACT inhaler Inhale 2 puffs into the lungs every 4 hours as needed for shortness of breath / dyspnea or wheezing 8.5 g 11     ALPRAZolam (XANAX XR) 1 MG 24 hr tablet Take 1 mg by mouth daily       aspirin (ASA) 81 MG tablet Take 81 mg by mouth daily        blood glucose monitoring (NO BRAND SPECIFIED) meter device kit Use to test blood sugar 2 times daily or as directed. Include test strips (2 boxes) with 3 refills 1 kit 0     celecoxib (CELEBREX) 200 MG capsule TAKE 1 CAPSULE BY MOUTH TWICE DAILY AS NEEDED FOR MODERATE PAIN. 180 capsule 1     cetirizine (ZYRTEC) 10 MG tablet Take 1 tablet (10 mg) by mouth At Bedtime 30 tablet 11     cholecalciferol (VITAMIN D3) 92487 units (1250 mcg) capsule Take one capsule every two weeks. 24 capsule 1     cyanocobalamin (CYANOCOBALAMIN) 1000 MCG/ML injection Inject 1 mL (1,000 mcg) into the muscle every 30 days 10 mL 0     DULoxetine (CYMBALTA) 60 MG capsule Take 60 mg by mouth daily       EPINEPHrine (EPIPEN/ADRENACLICK/OR ANY BX GENERIC EQUIV) 0.3 MG/0.3ML injection 2-pack Inject 0.3 mLs (0.3 mg) into the muscle once as needed for anaphylaxis 0.6 mL 3     famotidine (PEPCID) 10 MG tablet Take 10 mg by mouth Prior to administration of Humira every 2 weeks       fluticasone-salmeterol (ADVAIR DISKUS) 250-50 MCG/DOSE inhaler Inhale 1 puff into the lungs 2 times daily 1 Inhaler 11     Ginger, Zingiber officinalis, (GINGER ROOT) 550 MG CAPS capsule Take 550 mg by mouth Up to three times daily       hydrOXYzine (ATARAX) 50 MG tablet Take 50 mg by mouth 2 times daily For anxiety and insomnia       lamoTRIgine (LAMICTAL) 100 MG tablet Take 200 mg by mouth daily       levothyroxine (SYNTHROID/LEVOTHROID) 75 MCG tablet TAKE 1 TABLET EVERY  MORNING 90 tablet 1     lidocaine (XYLOCAINE) 5 % external ointment UAD 30 g      loperamide (IMODIUM) 2 MG capsule Take 2 mg by mouth 4 times daily as needed for diarrhea       melatonin 3 MG tablet Take 1 mg by mouth At Bedtime       metFORMIN (GLUCOPHAGE-XR) 500 MG 24 hr tablet Take 2 tablets (1,000 mg) by mouth daily (with dinner) 180 tablet 3     metoclopramide (REGLAN) 5 MG tablet Take 1 tablet (5 mg) by mouth 3 times daily as needed (nausea) 20 tablet 3     metoprolol succinate ER (TOPROL-XL) 200 MG 24 hr tablet TAKE 2 TABLETS (400MG) DAILY (Patient taking differently: Take 200 mg by mouth 2 times daily ) 180 tablet 0     montelukast (SINGULAIR) 10 MG tablet Take 1 tablet (10 mg) by mouth every evening 90 tablet 1     omega-3 acid ethyl esters (LOVAZA) 1 g capsule TAKE 2 CAPSULES BY MOUTH TWICE DAILY. 360 capsule 2     omeprazole 20 MG tablet Take 20 mg by mouth daily        ondansetron (ZOFRAN) 8 MG tablet TAKE 1 TABLET BY MOUTH EVERY 8 HOURS AS NEEDED FOR NAUSEA OR VOMITING 20 tablet 4     order for DME Equipment being ordered: lumbosacral belt/brace 1 Units 0     order for DME Respironics REMSTAR 60 Series Auto CPAP 9-13 cm H2O, Wisp nasal mask w/a large cushion and a chinstrap       oxyCODONE (ROXICODONE) 5 MG tablet Take 1-2 tablets (5-10 mg) by mouth every 6 hours as needed for pain (maximum 4 tablet(s) per day) 35 tablet 0     pregabalin (LYRICA) 150 MG capsule Take 1 capsule (150 mg) by mouth 3 times daily 90 capsule 5     pseudoePHEDrine (SUDAFED) 120 MG 12 hr tablet Take 1 tablet (120 mg) by mouth every 12 hours 28 tablet 0     QUEtiapine (SEROQUEL) 25 MG tablet Take 25 mg by mouth 4 times daily as needed       ramipril (ALTACE) 10 MG capsule Take 1 capsule (10 mg) by mouth daily 90 capsule 1     rizatriptan (MAXALT-MLT) 5 MG ODT tab Take 1 tablet (5 mg) by mouth at onset of headache for migraine 30 tablet 5     rosuvastatin (CRESTOR) 40 MG tablet Take 1 tablet (40 mg) by mouth daily 90 tablet 2      "syringe, disposable, (BD TUBERCULIN SYRINGE) 1 ML MISC Equipment being ordered: 1 ml tuberculin syringes to be used for Vitamin B12 injections. 12 each 1     vitamin C (ASCORBIC ACID) 500 MG tablet Take 500 mg by mouth daily       Allergies   Allergen Reactions     Amoxicillin-Pot Clavulanate Difficulty breathing     Banana Shortness Of Breath     Pt reports organic Banana is okay.      Nitroglycerin Palpitations     Penicillins Anaphylaxis     Provigil [Modafinil] Shortness Of Breath     headache     Gadolinium Hives and Itching     Patient was premedicated for the contrast allergy. He did still have a reaction a few hours after injection. Hives and itching. Dr. Gomez told tech to inform pt he should only have contrast again in the future when premedicated and at a hospital. Not at an outpatient facility.      Ketoconazole      Topical cream caused swelling and itching     Dye [Contrast Dye] Other (See Comments) and Hives     Moderate flushing, CT contrast     Golimumab      Hives, bradycardia, face swelling     Neurontin [Gabapentin] Hives     Moderate hives     Nortriptyline Hives     Varicella Virus Vaccine Live      Rash     Flagyl [Metronidazole Hcl] Palpitations and Hives     Latex Rash     Metronidazole Palpitations, Other (See Comments) and Rash     dizziness (versus ciprofloxacin taken at same time)     No Clinical Screening - See Comments Rash     Nitrile gloves         Reviewed and updated as needed this visit by Provider  Tobacco  Allergies  Meds  Problems  Med Hx  Surg Hx  Fam Hx         Review of Systems   ROS COMP: CV, Resp, psych      Objective    /80 (BP Location: Right arm, Patient Position: Chair, Cuff Size: Adult Large)   Pulse 68   Temp 98.3  F (36.8  C) (Oral)   Resp 20   Ht 1.956 m (6' 5\")   Wt 146.1 kg (322 lb)   SpO2 98%   BMI 38.18 kg/m    Body mass index is 38.18 kg/m .  Physical Exam   GENERAL: healthy, alert and no distress  RESP: lungs clear to auscultation - " "no rales, rhonchi or wheezes  CV: regular rate and rhythm, normal S1 S2, no S3 or S4, no murmur, click or rub  PSYCH: mentation appears normal, affect normal, some SI denies a plan to hurt himself    Diagnostic Test Results:  Labs reviewed in Epic        Assessment & Plan     1. Neuropathy  Refill pregabalin 1 pill 3 times a day.  Patient states he normally takes it twice a day but sometimes needs to take it 3 times a day.  Will adjust prescription for max dose  - pregabalin (LYRICA) 150 MG capsule; Take 1 capsule (150 mg) by mouth 3 times daily  Dispense: 90 capsule; Refill: 5    2. Neck pain  Patient having ongoing neck pain would like physical therapy referral for this  - PHYSICAL THERAPY REFERRAL; Future    3. Chronic pain syndrome  Chronic pain on medications, recently got oxycodone from his PCP    4. Chronic midline low back pain without sciatica  We will fax to the specialist  - order for DME; Equipment being ordered: lumbosacral belt/brace  Dispense: 1 Units; Refill: 0     BMI:   Estimated body mass index is 38.18 kg/m  as calculated from the following:    Height as of this encounter: 1.956 m (6' 5\").    Weight as of this encounter: 146.1 kg (322 lb).   Weight management plan: Discussed healthy diet and exercise guidelines        Return in about 2 months (around 1/6/2020), or if symptoms worsen or fail to improve, for Routine Visit.    JOCELYN Pro, NP-C  [facility]    This chart was documented by provider using a voice activated software called Dragon in addition to manual typing. There may be vocabulary errors or other grammatical errors due to this.       "

## 2019-11-07 ASSESSMENT — ASTHMA QUESTIONNAIRES: ACT_TOTALSCORE: 20

## 2019-11-10 DIAGNOSIS — M54.50 CHRONIC MIDLINE LOW BACK PAIN WITHOUT SCIATICA: ICD-10-CM

## 2019-11-10 DIAGNOSIS — E78.5 HYPERLIPIDEMIA LDL GOAL <100: ICD-10-CM

## 2019-11-10 DIAGNOSIS — G89.29 CHRONIC MIDLINE LOW BACK PAIN WITHOUT SCIATICA: ICD-10-CM

## 2019-11-10 NOTE — TELEPHONE ENCOUNTER
"Requested Prescriptions   Pending Prescriptions Disp Refills     rosuvastatin (CRESTOR) 40 MG tablet [Pharmacy Med Name: ROSUVASTATIN TAB 40MG] 90 tablet 2     Sig: TAKE 1 TABLET DAILY         Last Written Prescription Date:  3/13/19  Last Fill Quantity: 90,  # refills: 2   Last Office Visit with [facility], [facility] or [facility] prescribing provider:  11/6/19   Future Office Visit:    Next 5 appointments (look out 90 days)    Dec 12, 2019 10:00 AM CST  Return Visit with Elaine Segovia MD  [facility] ([facility]) [address] [place] [id number]  [phone]             Statins Protocol Passed - 11/10/2019 12:33 PM        Passed - LDL on file in past 12 months     Recent Labs   Lab Test 09/13/19  0943   LDL Cannot estimate LDL when triglyceride exceeds 400 mg/dL  64             Passed - No abnormal creatine kinase in past 12 months     Recent Labs   Lab Test 01/03/19  0844                   Passed - Recent (12 mo) or future (30 days) visit within the authorizing provider's specialty     Patient has had an office visit with the authorizing provider or a provider within the authorizing providers department within the previous 12 mos or has a future within next 30 days. See \"Patient Info\" tab in inbasket, or \"Choose Columns\" in Meds & Orders section of the refill encounter.              Passed - Medication is active on med list        Passed - Patient is age 18 or older              Cas Faarax  [facility]  "

## 2019-11-11 ENCOUNTER — TELEPHONE (OUTPATIENT)
Dept: BEHAVIORAL HEALTH | Facility: CLINIC | Age: 44
End: 2019-11-11

## 2019-11-11 NOTE — TELEPHONE ENCOUNTER
"Requested Prescriptions   Pending Prescriptions Disp Refills     celecoxib (CELEBREX) 200 MG capsule [Pharmacy Med Name: CELECOXIB CAP 200MG] 180 capsule 1     Sig: TAKE 1 CAPSULE 2 TIMES     DAILY AS NEEDED MODERATE   PAIN       NSAID Medications Failed - 11/10/2019 12:33 PM        Failed - Normal AST on file in past 12 months     Recent Labs   Lab Test 09/26/19  1010   AST 48*             Passed - Blood pressure under 140/90 in past 12 months     BP Readings from Last 3 Encounters:   11/06/19 115/80   10/31/19 137/89   10/23/19 118/78                 Passed - Normal ALT on file in past 12 months     Recent Labs   Lab Test 09/26/19  1010   ALT 53             Passed - Recent (12 mo) or future (30 days) visit within the authorizing provider's specialty     Patient has had an office visit with the authorizing provider or a provider within the authorizing providers department within the previous 12 mos or has a future within next 30 days. See \"Patient Info\" tab in inbasket, or \"Choose Columns\" in Meds & Orders section of the refill encounter.              Passed - Patient is age 6-64 years        Passed - Normal CBC on file in past 12 months     Recent Labs   Lab Test 09/26/19  1010   WBC 7.7   RBC 5.15   HGB 15.2   HCT 46.2                    Passed - Medication is active on med list        Passed - Normal serum creatinine on file in past 12 months     Recent Labs   Lab Test 09/26/19  1010   CR 0.83             celecoxib (CELEBREX) 200 MG capsule  Last Written Prescription Date:  5/15/19  Last Fill Quantity: 180,  # refills: 1   Last office visit: 11/6/2019 with prescribing provider:  Dr. Lyles   Future Office Visit:   Next 5 appointments (look out 90 days)    Dec 12, 2019 10:00 AM CST  Return Visit with Elaine Segovia MD  [facility] ([facility]) [address] [address] [place] [id number] [phone]           "

## 2019-11-11 NOTE — TELEPHONE ENCOUNTER
I spoke with Tito about his referral to Behavioral/Spiritual Health. He has a psychiatrist, therapist and is currently in DBT. He was referred because he was interested int other services that we may have to offer. When I spoke with Tito he stated that he was interested in the [facility]. He states he inquired about it a year ago, but was unable to find someone who knew anything about it. Writer communicated with Dr. Huerta who referred me to the [place] location. I gave client the telephone number and spoke with a staff member named Irma who states their intake department will reach out to him within a few days.

## 2019-11-12 ENCOUNTER — TELEPHONE (OUTPATIENT)
Dept: FAMILY MEDICINE | Facility: CLINIC | Age: 44
End: 2019-11-12

## 2019-11-12 DIAGNOSIS — M54.2 NECK PAIN: Primary | ICD-10-CM

## 2019-11-12 NOTE — TELEPHONE ENCOUNTER
Routing refill request to provider for review/approval because:  Labs out of range:  ACT not at goal  Kristin West RN

## 2019-11-12 NOTE — TELEPHONE ENCOUNTER
Routing refill request to provider for review/approval because:  9/13/19 LDL cannot be estimated. Please review and advise.     Heather White RN  [facility]

## 2019-11-12 NOTE — TELEPHONE ENCOUNTER
Ruchi from [facility] in [place] is requesting referral for physical therapy for patient's neck so they can start treatment.    [phone]  Okay to call anytime and leave a message.    Fax: [phone] Attn: Ruchi    Thank you.

## 2019-11-12 NOTE — TELEPHONE ENCOUNTER
Referral faxed to Orthology in [place] [facility])  Fax: [phone] Attn: Ruchi MASSEY)(CHEY)

## 2019-11-12 NOTE — TELEPHONE ENCOUNTER
Appears current order in for [facility].  Please place new one for Orthology in [place].    Cheryl AGUILAR, Patient Care

## 2019-11-13 RX ORDER — ROSUVASTATIN CALCIUM 40 MG/1
TABLET, COATED ORAL
Qty: 90 TABLET | Refills: 1 | Status: SHIPPED | OUTPATIENT
Start: 2019-11-13 | End: 2020-05-24

## 2019-11-13 RX ORDER — CELECOXIB 200 MG/1
CAPSULE ORAL
Qty: 180 CAPSULE | Refills: 1 | Status: SHIPPED | OUTPATIENT
Start: 2019-11-13 | End: 2020-05-10

## 2019-11-14 ENCOUNTER — MYC MEDICAL ADVICE (OUTPATIENT)
Dept: FAMILY MEDICINE | Facility: CLINIC | Age: 44
End: 2019-11-14

## 2019-11-14 DIAGNOSIS — G62.9 NEUROPATHY: ICD-10-CM

## 2019-11-15 RX ORDER — PREGABALIN 150 MG/1
150 CAPSULE ORAL 3 TIMES DAILY
Qty: 90 CAPSULE | Refills: 5 | Status: SHIPPED | OUTPATIENT
Start: 2019-11-15 | End: 2019-12-16

## 2019-11-17 ENCOUNTER — MYC REFILL (OUTPATIENT)
Dept: PULMONOLOGY | Facility: CLINIC | Age: 44
End: 2019-11-17

## 2019-11-17 DIAGNOSIS — J45.30 MILD PERSISTENT ASTHMA: ICD-10-CM

## 2019-11-18 RX ORDER — MONTELUKAST SODIUM 10 MG/1
10 TABLET ORAL EVERY EVENING
Qty: 90 TABLET | Refills: 1 | Status: SHIPPED | OUTPATIENT
Start: 2019-11-18 | End: 2020-05-10

## 2019-11-21 ENCOUNTER — TELEPHONE (OUTPATIENT)
Dept: FAMILY MEDICINE | Facility: CLINIC | Age: 44
End: 2019-11-21

## 2019-11-24 ENCOUNTER — MYC REFILL (OUTPATIENT)
Dept: FAMILY MEDICINE | Facility: CLINIC | Age: 44
End: 2019-11-24

## 2019-11-24 DIAGNOSIS — M45.8 ANKYLOSING SPONDYLITIS OF SACRAL REGION (H): ICD-10-CM

## 2019-11-24 DIAGNOSIS — M51.369 DDD (DEGENERATIVE DISC DISEASE), LUMBAR: ICD-10-CM

## 2019-11-25 NOTE — PROGRESS NOTES
SUBJECTIVE/OBJECTIVE:                           Joel Pineda is a 44 year old male called for a follow visit for Medication Therapy Management.  He was referred to me from Ksenia Lyles.     Chief Complaint: Follow up from 10/8/19.   Patient is looking to the future and looking at taking  classes. Patient is planning on taking the [employer] to [place] to see his sister tomorrow.    Allergies/ADRs: Reviewed in Epic  Tobacco: No tobacco use  Alcohol: none  Caffeine: not assessed today  Activity: Not assessed today  PMH: Reviewed in Epic    Ankylosing Spondylitis/Pain: Current medications include Humira 40mg every 2 weeks. patient reports this is going well-no increase in infections. Patient does have some nausea with the injection but feels this goes away pretty quickly.Patient reports ADLs are becoming difficult with the pain. Patient is using TENS unit, ice and heat which are somewhat effective. Patient is taking pregabalin 150mg three times daily. Patient is trying to limit how much he is taking oxycodone. Patient is not taking every day. Patient does need to take 2 after physical therapy. Patient is going to physical therapy twice a week ([facility]). Patient met with neurosurgery and it was felt his pain is from ankylosing spondylitis and surgery not an option. Patient is also looking forward to getting a brace for his back from [facility].    Asthma: Current asthma medications: Short-Acting Bronchodilator: Albuterol MDI patient has had to use the albuterol three times a week. Patient had tried to decrease his Advair to just once daily but was having more symptoms.  Patient will give himself an injection of humira and a couple of days later he will need to use his albuterol.  ICS/LABA- Advair 1 puff(s) twice daily. Pt rinses their mouth after using steroid inhaler. Asthma triggers include: Patient is unaware of triggers.  AAP on file: YES  ACT Total Scores 1/3/2019 5/28/2019 11/6/2019   ACT TOTAL  SCORE - - -   ASTHMA ER VISITS - - -   ASTHMA HOSPITALIZATIONS - - -   ACT TOTAL SCORE (Goal Greater than or Equal to 20) 22 22 20   In the past 12 months, how many times did you visit the emergency room for your asthma without being admitted to the hospital? 1 1 0   In the past 12 months, how many times were you hospitalized overnight because of your asthma? 0 0 0     Mood/Anxiety: current therapy includes duloxetine 60mg daily, lamictal 200mg daily, Xanax XR 0.5mg every day (this is a taper for the patient), quetiapine 25mg as needed-patient is taking about one time a week. Patient has been taking 50mg of quetiapine for sleep twice a week. Patient is following with psychiatrist every 6 weeks. Patient has another 8 weeks left of DBT. Patient reports his mood is generally better. Patient is feeling more in control of his emotions.    ASSESSMENT:                             Current medications were reviewed today.     Medication Adherence: no issues identified    Ankylosing Spondylitis/Pain: Stable.     Mood/Anxiety: Stable.  Patient in close follow-up with psychiatry.    Asthma: Stable. Patient's symptoms better managed now that he is back on twice daily dosing of Advair. Likely his Humira could be causing some bronchospasm as well.    PLAN:                            No medication changes recommended today.    I spent 30 minutes with this patient today. All changes were made via collaborative practice agreement with Ksenia Lyles. A copy of the visit note was provided to the patient's primary care provider.    Will follow up in 8 weeks.    The patient was sent via Kor[employer] a summary of these recommendations as an after visit summary.     Radha Mcdonald, Pharm.D, BCACP  Medication Therapy Management Pharmacist

## 2019-11-25 NOTE — TELEPHONE ENCOUNTER
Requested Prescriptions   Pending Prescriptions Disp Refills     oxyCODONE (ROXICODONE) 5 MG tablet 35 tablet 0     Sig: Take 1-2 tablets (5-10 mg) by mouth every 6 hours as needed for pain (maximum 4 tablet(s) per day)       There is no refill protocol information for this order          oxyCODONE (ROXICODONE) 5 MG tablet      Last Written Prescription Date:  11/5/19  Last Fill Quantity: 35,   # refills: 0  Last Office Visit: 11/6/19  Future Office visit:    Next 5 appointments (look out 90 days)    Dec 12, 2019 10:00 AM CST  [facility] Visit with [facility]  [facility] [address] [place] [id number] [phone]           Routing refill request to provider for review/approval because:  Drug not on the [facility], [facility] or [facility] refill protocol or controlled substance

## 2019-11-26 ENCOUNTER — ALLIED HEALTH/NURSE VISIT (OUTPATIENT)
Dept: PHARMACY | Facility: CLINIC | Age: 44
End: 2019-11-26
Payer: COMMERCIAL

## 2019-11-26 DIAGNOSIS — G89.4 CHRONIC PAIN SYNDROME: Primary | ICD-10-CM

## 2019-11-26 DIAGNOSIS — M45.8 ANKYLOSING SPONDYLITIS OF SACRAL REGION (H): ICD-10-CM

## 2019-11-26 DIAGNOSIS — J45.31 MILD PERSISTENT ASTHMA WITH ACUTE EXACERBATION: ICD-10-CM

## 2019-11-26 DIAGNOSIS — F41.8 DEPRESSION WITH ANXIETY: ICD-10-CM

## 2019-11-26 PROCEDURE — 99607 MTMS BY PHARM ADDL 15 MIN: CPT | Performed by: PHARMACIST

## 2019-11-26 PROCEDURE — 99606 MTMS BY PHARM EST 15 MIN: CPT | Performed by: PHARMACIST

## 2019-11-26 RX ORDER — ALPRAZOLAM 0.5 MG/1
0.5 TABLET, EXTENDED RELEASE ORAL 3 TIMES DAILY PRN
COMMUNITY
End: 2020-06-09 | Stop reason: ALTCHOICE

## 2019-11-26 RX ORDER — OXYCODONE HYDROCHLORIDE 5 MG/1
5-10 TABLET ORAL EVERY 6 HOURS PRN
Qty: 35 TABLET | Refills: 0 | Status: SHIPPED | OUTPATIENT
Start: 2019-11-26 | End: 2019-12-16

## 2019-11-26 NOTE — PATIENT INSTRUCTIONS
Recommendations from today's MTM visit:                                                      No medication recommendations made today.    It was great to speak with you today.  I value your experience and would be very thankful for your time with providing feedback on our clinic survey. You may receive a survey via email or text message in the next few days.     Next MTM visit: 2 months    To schedule another MTM appointment, please call the clinic directly or you may call the MTM scheduling line at [phone] or toll-free at [phone].     My Clinical Pharmacist's contact information:                                                      It was a pleasure talking with you today!  Please feel free to contact me with any questions or concerns you have.      Radha Mcdonald, Pharm.D, BCACP  Medication Therapy Management Pharmacist

## 2019-11-26 NOTE — TELEPHONE ENCOUNTER
Controlled Substance Refill Request for Oxycodone  Problem List Complete:  Yes  Chronic pain syndrome (Chronic)   Problem Detail     Noted:  4/4/2017   Priority:  Medium   Overview Addendum 11/5/2019  3:04 PM by Sapna Barragan RN   Patient is followed by Ksenia Lyles MD for ongoing prescription of pain medication.  All refills should only be approved by this provider, or covering partner.     Medication(s): oxy 5.   Maximum quantity per month: 40  Clinic visit frequency required: Q3  months      Controlled substance agreement: 6/12/19  Encounter-Level CSA - 04/04/2017:            Controlled Substance Agreement - Scan on 4/11/2017  1:30 PM : CONTROLLED SUBSTANCE AGREEMENT (below)                   Pain Clinic evaluation in the past: Yes     DIRE Total Score(s):  No flowsheet data found.     Last [facility] website verification:  11/5/19   [email or web address]       checked in past 3 months?  Yes 11/5/19   Last Written Prescription Date:  11/5/19  Last Fill Quantity: 35 tablets  # refills: 0   Last office visit: 11/6/2019 with prescribing provider:  11/6/19   Future Office Visit:   Next 5 appointments (look out 90 days)    Dec 12, 2019 10:00 AM CST  Return Visit with Elaine Segovia MD  [facility] [address] [place] [id number]  [phone]         RX monitoring program ([place]) reviewed:  not reviewed/not due - last done on 11/5/19    [place] profile:  [email or web address]        Martell Chaves RN, BSN, PHN

## 2019-11-29 ENCOUNTER — TELEPHONE (OUTPATIENT)
Dept: FAMILY MEDICINE | Facility: CLINIC | Age: 44
End: 2019-11-29

## 2019-11-29 NOTE — TELEPHONE ENCOUNTER
Reason for call:  Symptom   Symptom or request: foot pain    Duration (how long have symptoms been present):   Have you been treated for this before?     Additional comments: Patient wants to talk to a nurse about his lack of mobility with his left achilles tendon issue    He is out of town on vacation and can't get around    Do you think going to ER and getting a walking boot would help?          Phone number to reach patient:  Home number on file [phone] (home)    Best Time:  any    Can we leave a detailed message on this number?  YES

## 2019-11-29 NOTE — TELEPHONE ENCOUNTER
vascular claudication was the thought last spring but was ruled out in the left foot.    Patient is now having the pain again. States that he does have spondylitis and is wondering if that could be causing the ankle/achilles pain.     The patient and I discussed going to the  in [place] for an assessment to rule out any form of injury and potential provide patient with a brace for him to have support of his left foot for the rest of his vacation then to follow up upon return if the pain is still present with his PCP.     Patient agreed to this plan and will keep his apt on 12/02/2019.     Deb Coleman RN

## 2019-12-02 ENCOUNTER — OFFICE VISIT (OUTPATIENT)
Dept: FAMILY MEDICINE | Facility: CLINIC | Age: 44
End: 2019-12-02
Payer: MEDICARE

## 2019-12-02 ENCOUNTER — MYC MEDICAL ADVICE (OUTPATIENT)
Dept: FAMILY MEDICINE | Facility: CLINIC | Age: 44
End: 2019-12-02

## 2019-12-02 VITALS
OXYGEN SATURATION: 97 % | DIASTOLIC BLOOD PRESSURE: 82 MMHG | HEART RATE: 65 BPM | BODY MASS INDEX: 37.19 KG/M2 | HEIGHT: 77 IN | WEIGHT: 315 LBS | TEMPERATURE: 98.4 F | SYSTOLIC BLOOD PRESSURE: 130 MMHG

## 2019-12-02 DIAGNOSIS — D17.30 LIPOMA OF SKIN AND SUBCUTANEOUS TISSUE: ICD-10-CM

## 2019-12-02 DIAGNOSIS — S86.002A INJURY OF LEFT ACHILLES TENDON, INITIAL ENCOUNTER: Primary | ICD-10-CM

## 2019-12-02 DIAGNOSIS — D17.30 LIPOMA OF SKIN AND SUBCUTANEOUS TISSUE: Primary | ICD-10-CM

## 2019-12-02 PROCEDURE — 99213 OFFICE O/P EST LOW 20 MIN: CPT | Performed by: NURSE PRACTITIONER

## 2019-12-02 ASSESSMENT — MIFFLIN-ST. JEOR: SCORE: 2494.25

## 2019-12-02 NOTE — PROGRESS NOTES
Subjective     Joel Pineda is a 44 year old male who presents to clinic today for the following health issues:    HPI   Follow up Achilles tendinitis  Onset : 11/291/9  -has a boot and it has gotten better  Had x-rays in [place]  When he was visiting his sister.  He went to urgent care and they gave him a boot.  It feels like the pain is significantly improved but now he is having some ankle pain.  Likely from the pressure of the boot itself.     Lipoma on back for years  -it is red  -increasing in size  -it is getting more tender and painful over time. Rubbing causes discomfort.   -going to [facility] in [place]: Fax PT order to them at [phone]      lyrica causing vision issues with higher doses. Taking 3x/day  Xanax now 0.5 mg  -He needs to make an appointment with his PCP to talk about getting forms filled out for this.    Compounding pharmacy for anal fissues.  He will call back with this medication so we can add it to the medication list        Patient Active Problem List   Diagnosis     Major depressive disorder, recurrent episode (H)     Intermittent asthma     Hyperlipidemia LDL goal <100     Chronic nonallergic rhinitis     Diverticulosis     GERD (gastroesophageal reflux disease)     Anxiety     LLOYD (obstructive sleep apnea)- mild (AHI 11)     Intracranial arachnoid cyst     Facet arthritis of cervical region     Acquired hypothyroidism     Bipolar 2 disorder (H)     Chronic midline low back pain without sciatica     Irritable bowel syndrome with diarrhea     B12 deficiency     Essential hypertension with goal blood pressure less than 140/90     Chronic pain syndrome     Ankylosing spondylitis of sacral region (H)     Morbid obesity due to hypertriglyceridemia (H)     Fatty infiltration of liver     DDD (degenerative disc disease), lumbar     Type 2 diabetes mellitus with complication, without long-term current use of insulin (H)     Peripheral polyneuropathy     History of pulmonary embolism      Ingrown toenail     Lipoma of skin and subcutaneous tissue     Past Surgical History:   Procedure Laterality Date     BACK SURGERY  10/07    lumbar discectomy L5-S1     COLONOSCOPY      Note: colonoscopy scheduled with P on Friday, 9/4/15     COSMETIC SURGERY  2012    Nose Exterior - functional     GI SURGERY  August 2013    Sigmoidectomy     HERNIA REPAIR, UMBILICAL  8/23/11    henok, Dr. Evan Beavers     INCISION AND DRAINAGE, ABSCESS, COMPLEX  8/23/11    umbilical, Dr. Evan Beavers     LAPAROSCOPIC ASSISTED COLECTOMY LEFT (DESCENDING)  8/15/2013    Procedure: LAPAROSCOPIC ASSISTED COLECTOMY LEFT (DESCENDING);  Laparoscopic Hand Assisted Sigmoid Resection, Mobilization of Splenic Fissure, coloproctoscopy, *Latex Free Room* Anesthesia General with Pain block  ;  Surgeon: Aurora Justice MD;  Location: [facility] OR     NERVE SURGERY  8/18/11    RF ablation @ L3-S1 @ [facility]     RECONSTRUCT NOSE AND SEPTUM (FUNCTIONAL)  10/14/2011    Procedure:RECONSTRUCT NOSE AND SEPTUM (FUNCTIONAL); Functional Septorhinoplasty, Turbinate Reduction, ; Surgeon:CEDRIC CUEVAS; Location:[facility] OR     SINUS SURGERY  10/1/01    ethmoidectomy chronic sinusitis       Social History     Tobacco Use     Smoking status: Never Smoker     Smokeless tobacco: Never Used   Substance Use Topics     Alcohol use: Yes     Alcohol/week: 0.0 standard drinks     Drinks per session: 1 or 2     Binge frequency: Weekly     Comment: occ 1/week     Family History   Problem Relation Age of Onset     Musculoskeletal Disorder Mother         back     Anxiety Disorder Mother      Colon Polyps Mother      Ulcerative Colitis Mother         and ischemic small intestine, surgery     Hypertension Mother      Breast Cancer Mother      Osteoporosis Mother      Diabetes Mother         Type 2, Diagnosed in 2014     Depression Mother         Takes Cymbalta to help with chronic pain + depx     Thyroid Disease Mother         Hypothyroidism     Obesity Mother         Under much  "better control latter half of 2015     Musculoskeletal Disorder Father         back     Substance Abuse Father      Hypertension Father      Hyperlipidemia Father      Depression Father         Off meds for many years. Seems \"ok\"     Heart Disease Maternal Grandmother      Heart Disease Maternal Grandfather      Psychotic Disorder Paternal Grandfather      Suicide Paternal Grandfather      Depression Paternal Grandfather         Pediatrician. Committed suicide by pistol in 1990.     Musculoskeletal Disorder Brother         back     Depression Brother         Expressed as anger and moodiness     Substance Abuse Sister      Depression Sister         Mental Health Therapist, yet no anti-depressants?     Anxiety Disorder Sister         Mental Health Therapist, yet no anti-anxiety meds?     Other Cancer Other         Bladder Cancer - Fatal     Substance Abuse Brother      Colon Cancer No family hx of      Crohn's Disease No family hx of      Anesthesia Reaction No family hx of      Cancer No family hx of         No family history of skin cancer         Current Outpatient Medications   Medication Sig Dispense Refill     acetaminophen 500 MG CAPS Take 500 mg by mouth every 4 hours as needed 60 capsule      adalimumab (HUMIRA *CF*) 40 MG/0.4ML pen kit Inject 0.4 mLs (40 mg) Subcutaneous every 14 days . Hold for signs of infection, then seek medical attention. 2.4 mL 3     albuterol (PROAIR HFA/PROVENTIL HFA/VENTOLIN HFA) 108 (90 Base) MCG/ACT inhaler Inhale 2 puffs into the lungs every 4 hours as needed for shortness of breath / dyspnea or wheezing 8.5 g 11     ALPRAZolam (XANAX XR) 0.5 MG 24 hr tablet Take 0.5 mg by mouth every morning       aspirin (ASA) 81 MG tablet Take 81 mg by mouth daily        blood glucose monitoring (NO BRAND SPECIFIED) meter device kit Use to test blood sugar 2 times daily or as directed. Include test strips (2 boxes) with 3 refills 1 kit 0     celecoxib (CELEBREX) 200 MG capsule TAKE 1 CAPSULE 2 " TIMES     DAILY AS NEEDED MODERATE   PAIN 180 capsule 1     cetirizine (ZYRTEC) 10 MG tablet Take 1 tablet (10 mg) by mouth At Bedtime 30 tablet 11     cholecalciferol (VITAMIN D3) 92903 units (1250 mcg) capsule Take one capsule every two weeks. 24 capsule 1     cyanocobalamin (CYANOCOBALAMIN) 1000 MCG/ML injection Inject 1 mL (1,000 mcg) into the muscle every 30 days 10 mL 0     DULoxetine (CYMBALTA) 60 MG capsule Take 60 mg by mouth daily       EPINEPHrine (EPIPEN/ADRENACLICK/OR ANY BX GENERIC EQUIV) 0.3 MG/0.3ML injection 2-pack Inject 0.3 mLs (0.3 mg) into the muscle once as needed for anaphylaxis 0.6 mL 3     famotidine (PEPCID) 10 MG tablet Take 10 mg by mouth Prior to administration of Humira every 2 weeks       fluticasone-salmeterol (ADVAIR DISKUS) 250-50 MCG/DOSE inhaler Inhale 1 puff into the lungs 2 times daily 1 Inhaler 11     Ginger, Zingiber officinalis, (GINGER ROOT) 550 MG CAPS capsule Take 550 mg by mouth Up to three times daily       hydrOXYzine (ATARAX) 50 MG tablet Take 50 mg by mouth 2 times daily For anxiety and insomnia       lamoTRIgine (LAMICTAL) 100 MG tablet Take 200 mg by mouth daily       levothyroxine (SYNTHROID/LEVOTHROID) 75 MCG tablet TAKE 1 TABLET EVERY MORNING 90 tablet 1     Liniments (SALONPAS EX) Externally apply 1 Application topically daily as needed       loperamide (IMODIUM) 2 MG capsule Take 2 mg by mouth 4 times daily as needed for diarrhea       melatonin 3 MG tablet Take 1 mg by mouth At Bedtime       metFORMIN (GLUCOPHAGE-XR) 500 MG 24 hr tablet Take 2 tablets (1,000 mg) by mouth daily (with dinner) 180 tablet 3     metoclopramide (REGLAN) 5 MG tablet Take 1 tablet (5 mg) by mouth 3 times daily as needed (nausea) 20 tablet 3     metoprolol succinate ER (TOPROL-XL) 200 MG 24 hr tablet TAKE 2 TABLETS (400MG) DAILY (Patient taking differently: Take 200 mg by mouth 2 times daily ) 180 tablet 0     montelukast (SINGULAIR) 10 MG tablet Take 1 tablet (10 mg) by mouth every  evening 90 tablet 1     omega-3 acid ethyl esters (LOVAZA) 1 g capsule TAKE 2 CAPSULES BY MOUTH TWICE DAILY. 360 capsule 2     omeprazole 20 MG tablet Take 20 mg by mouth daily        ondansetron (ZOFRAN) 8 MG tablet TAKE 1 TABLET BY MOUTH EVERY 8 HOURS AS NEEDED FOR NAUSEA OR VOMITING 20 tablet 4     order for DME Equipment being ordered: lumbosacral belt/brace 1 Units 0     order for DME Respironics REMSTAR 60 Series Auto CPAP 9-13 cm H2O, Wisp nasal mask w/a large cushion and a chinstrap       oxyCODONE (ROXICODONE) 5 MG tablet Take 1-2 tablets (5-10 mg) by mouth every 6 hours as needed for pain (maximum 4 tablet(s) per day) 35 tablet 0     pregabalin (LYRICA) 150 MG capsule Take 1 capsule (150 mg) by mouth 3 times daily 90 capsule 5     pseudoePHEDrine (SUDAFED) 120 MG 12 hr tablet Take 1 tablet (120 mg) by mouth every 12 hours 28 tablet 0     QUEtiapine (SEROQUEL) 25 MG tablet Take 25 mg by mouth 4 times daily as needed       ramipril (ALTACE) 10 MG capsule Take 1 capsule (10 mg) by mouth daily 90 capsule 1     rizatriptan (MAXALT-MLT) 5 MG ODT tab Take 1 tablet (5 mg) by mouth at onset of headache for migraine 30 tablet 5     rosuvastatin (CRESTOR) 40 MG tablet TAKE 1 TABLET DAILY 90 tablet 1     syringe, disposable, (BD TUBERCULIN SYRINGE) 1 ML MISC Equipment being ordered: 1 ml tuberculin syringes to be used for Vitamin B12 injections. 12 each 1     vitamin C (ASCORBIC ACID) 500 MG tablet Take 500 mg by mouth daily       Allergies   Allergen Reactions     Amoxicillin-Pot Clavulanate Difficulty breathing     Banana Shortness Of Breath     Pt reports organic Banana is okay.      Nitroglycerin Palpitations     Penicillins Anaphylaxis     Provigil [Modafinil] Shortness Of Breath     headache     Gadolinium Hives and Itching     Patient was premedicated for the contrast allergy. He did still have a reaction a few hours after injection. Hives and itching. Dr. Gomez told tech to inform pt he should only have  "contrast again in the future when premedicated and at a hospital. Not at an outpatient facility.      Ketoconazole      Topical cream caused swelling and itching     Dye [Contrast Dye] Other (See Comments) and Hives     Moderate flushing, CT contrast     Golimumab      Hives, bradycardia, face swelling     Neurontin [Gabapentin] Hives     Moderate hives     Nortriptyline Hives     Varicella Virus Vaccine Live      Rash     Flagyl [Metronidazole Hcl] Palpitations and Hives     Latex Rash     Metronidazole Palpitations, Other (See Comments) and Rash     dizziness (versus ciprofloxacin taken at same time)     No Clinical Screening - See Comments Rash     Nitrile gloves       Reviewed and updated as needed this visit by Provider  Tobacco  Allergies  Meds  Problems  Med Hx  Surg Hx  Fam Hx         Review of Systems   ROS COMP: skin as above, MS as above      Objective    /82 (BP Location: Right arm, Patient Position: Sitting, Cuff Size: Adult Large)   Pulse 65   Temp 98.4  F (36.9  C) (Oral)   Ht 1.956 m (6' 5\")   Wt 149.7 kg (330 lb)   SpO2 97%   BMI 39.13 kg/m    Body mass index is 39.13 kg/m .  Physical Exam   GENERAL: healthy, alert and no distress  MS: no gross musculoskeletal defects noted.  Left ankle range of motion slightly decreased, left Achilles tendon tender to palpation  SKIN: No rash on left Achilles tendon or foot.  Left middle lower back small lipoma noted, with slight erythema noted within    Diagnostic Test Results:  Labs reviewed in Epic        Assessment & Plan     1. Injury of left Achilles tendon, initial encounter  Follow-up with physical therapy, continue using the boot, also gave instructions on stretching and taping  - PHYSICAL THERAPY REFERRAL; Future    2. Lipoma of skin and subcutaneous tissue  Appears to be getting more red.  It does appear to be slightly infected with a pimple-like spot in the middle of it.  Will start topical antibiotic, if not improving in a couple of " "days may need to consider oral antibiotics.  Overall the lipoma itself is becoming more bothersome, and he may be consider getting removed because it keeps rubbing and causing discomfort       BMI:   Estimated body mass index is 39.13 kg/m  as calculated from the following:    Height as of this encounter: 1.956 m (6' 5\").    Weight as of this encounter: 149.7 kg (330 lb).       Return in about 4 weeks (around 12/30/2019), or if symptoms worsen or fail to improve.    JOCELYN Pro, NP-C  [facility]    This chart was documented by provider using a voice activated software called Dragon in addition to manual typing. There may be vocabulary errors or other grammatical errors due to this.       "

## 2019-12-02 NOTE — PATIENT INSTRUCTIONS
Standing calf raises: 15-20 3 times a day  Slowly build up to just left leg standing calf raises  Then over a step: go slow!  Do ABCs for ankle pain  At PT: ask about massage/ultrasound to left ankle      Patient Education     Calf Raise (Strength)    1. Stand up straight with both feet flat on the floor, slightly apart. Hold onto a sturdy chair, railing, counter, or table.  2. Raise both heels so you re standing on the balls of your feet. Don t lock your knees or arch your back. Hold for 5 seconds. Then slowly lower your heels back down to the floor.  3. Repeat 10 times, or as instructed.  4. Do this exercise 3 times a day, or as instructed.     Challenge yourself  As you become stronger, do this exercise on one foot at a time.   Date Last Reviewed: 3/10/2016    [id number] [employer]. [address] [place] [address]. All rights reserved. This information is not intended as a substitute for professional medical care. Always follow your healthcare professional's instructions.           Patient Education     Plantarflexion (Strength)    These instructions are for your right ankle. Switch sides for your left ankle.  1. Sit on a bed or the floor with your right leg out straight.  2. Loop an elastic exercise band or tubing around your right foot. Hold the ends in your hands.  3. Push on the elastic band with the ball of your foot, pointing your toes. Pull the elastic band toward as you do this. Hold for 5 seconds. Then move your foot back to the starting position.  4. Repeat 10 times, or as instructed.  5. Do this exercise 3 times a day, or as instructed.  Date Last Reviewed: 3/10/2016    [id number] [employer]. [address] [place] [address]. All rights reserved. This information is not intended as a substitute for professional medical care. Always follow your healthcare professional's instructions.

## 2019-12-02 NOTE — Clinical Note
Please Fax PT order to ortho:[phone] Bunt, APRN, [facility]

## 2019-12-03 RX ORDER — CLINDAMYCIN HCL 300 MG
300 CAPSULE ORAL 2 TIMES DAILY
Qty: 14 CAPSULE | Refills: 0 | Status: SHIPPED | OUTPATIENT
Start: 2019-12-03 | End: 2019-12-27

## 2019-12-12 ENCOUNTER — OFFICE VISIT (OUTPATIENT)
Dept: PULMONOLOGY | Facility: CLINIC | Age: 44
End: 2019-12-12
Payer: MEDICARE

## 2019-12-12 ENCOUNTER — NURSE TRIAGE (OUTPATIENT)
Dept: NURSING | Facility: CLINIC | Age: 44
End: 2019-12-12

## 2019-12-12 VITALS
BODY MASS INDEX: 37.19 KG/M2 | RESPIRATION RATE: 18 BRPM | WEIGHT: 315 LBS | DIASTOLIC BLOOD PRESSURE: 87 MMHG | SYSTOLIC BLOOD PRESSURE: 125 MMHG | HEIGHT: 77 IN | HEART RATE: 83 BPM | OXYGEN SATURATION: 97 %

## 2019-12-12 DIAGNOSIS — J31.0 CHRONIC NONALLERGIC RHINITIS: Chronic | ICD-10-CM

## 2019-12-12 DIAGNOSIS — J45.30 MILD PERSISTENT ASTHMA, UNSPECIFIED WHETHER COMPLICATED: Primary | ICD-10-CM

## 2019-12-12 PROCEDURE — 99213 OFFICE O/P EST LOW 20 MIN: CPT | Performed by: INTERNAL MEDICINE

## 2019-12-12 ASSESSMENT — ASTHMA QUESTIONNAIRES
QUESTION_5 LAST FOUR WEEKS HOW WOULD YOU RATE YOUR ASTHMA CONTROL: WELL CONTROLLED
QUESTION_4 LAST FOUR WEEKS HOW OFTEN HAVE YOU USED YOUR RESCUE INHALER OR NEBULIZER MEDICATION (SUCH AS ALBUTEROL): TWO OR THREE TIMES PER WEEK
QUESTION_1 LAST FOUR WEEKS HOW MUCH OF THE TIME DID YOUR ASTHMA KEEP YOU FROM GETTING AS MUCH DONE AT WORK, SCHOOL OR AT HOME: A LITTLE OF THE TIME
ACT_TOTALSCORE: 19
QUESTION_2 LAST FOUR WEEKS HOW OFTEN HAVE YOU HAD SHORTNESS OF BREATH: ONCE OR TWICE A WEEK
QUESTION_3 LAST FOUR WEEKS HOW OFTEN DID YOUR ASTHMA SYMPTOMS (WHEEZING, COUGHING, SHORTNESS OF BREATH, CHEST TIGHTNESS OR PAIN) WAKE YOU UP AT NIGHT OR EARLIER THAN USUAL IN THE MORNING: ONCE OR TWICE

## 2019-12-12 ASSESSMENT — PAIN SCALES - GENERAL: PAINLEVEL: NO PAIN (0)

## 2019-12-12 ASSESSMENT — MIFFLIN-ST. JEOR: SCORE: 2494.25

## 2019-12-12 NOTE — NURSING NOTE
"Joel Pineda's goals for this visit include: Return  He requests these members of his care team be copied on today's visit information: PCP    PCP: Ksenia Lyles    Referring Provider:  No referring provider defined for this encounter.    /87   Pulse 83   Resp 18   Ht 1.956 m (6' 5\")   Wt 149.7 kg (330 lb)   SpO2 97%   BMI 39.13 kg/m      Do you need any medication refills at today's visit? N    "

## 2019-12-12 NOTE — LETTER
12/12/2019        RE: Joel Pineda  [address] [facility] [place] [address]        Pulmonary Clinic Return Visit  History of Present Illness    Mr. Pineda is a 44-year-old male with a history of ankylosing spondylitis on Humira, acid reflux, allergies, and asthma who presents to pulmonary clinic today for further follow-up of asthma.  We last visited about 6 months ago at which point he was doing well. His asthma had been decently well controlled on Advair 250/50, Singulair, and albuterol as needed and pulmonary function has historically been normal.  At our last visit, we made the decision to attempt to de-escalate therapy by stopping Singulair.      He attempted to stop Singulair and nearly immediately noted increase in asthma symptoms of cough and chest tightness.  He required more frequent use of his albuterol rescue inhaler up to 4x per week.  He re-initiated his Singulair and has done fine since then.  He is using his rescue inhaler now once per week with good relief of symptoms.  He denies any new or worsening shortness of breath, or cough.  He continues to take Zyrtec daily for allergies.  He has not required prednisone for acute asthma exacerbation and has not had any episodes of pneumonia since we last visited.      He is a never smoker.      Review of Systems:  10 of 14 systems reviewed and are negative unless otherwise stated in HPI.    Past Medical History:   Diagnosis Date     Acne      Allergic state      Anxiety      Bipolar 2 disorder (H)      Chest pain     Chest pain, regulated w/BP meds. Clear arteries.     Chronic pain      Depressive disorder      Diabetes (H)      Diverticulosis      Gastroesophageal reflux disease      Hypertension      IBS (irritable bowel syndrome)      Intracranial arachnoid cyst      Polyneuropathy      Pulmonary embolism (H)      Skin exam, screening for cancer 12/3/2013     Sleep apnea      Uncomplicated asthma        Past Surgical History:   Procedure  "Laterality Date     BACK SURGERY  10/07    lumbar discectomy L5-S1     COLONOSCOPY      Note: colonoscopy scheduled with [facility] on Friday, 9/4/15     COSMETIC SURGERY  2012    Nose Exterior - functional     GI SURGERY  August 2013    Sigmoidectomy     HERNIA REPAIR, UMBILICAL  8/23/11    small, Dr. Evan Beavers     INCISION AND DRAINAGE, ABSCESS, COMPLEX  8/23/11    umbilical, Dr. Evan Beavers     LAPAROSCOPIC ASSISTED COLECTOMY LEFT (DESCENDING)  8/15/2013    Procedure: LAPAROSCOPIC ASSISTED COLECTOMY LEFT (DESCENDING);  Laparoscopic Hand Assisted Sigmoid Resection, Mobilization of Splenic Fissure, coloproctoscopy, *Latex Free Room* Anesthesia General with Pain block  ;  Surgeon: Aurora Justice MD;  Location: [facility] OR     NERVE SURGERY  8/18/11    RF ablation @ L3-S1 @ [facility]     RECONSTRUCT NOSE AND SEPTUM (FUNCTIONAL)  10/14/2011    Procedure:RECONSTRUCT NOSE AND SEPTUM (FUNCTIONAL); Functional Septorhinoplasty, Turbinate Reduction, ; Surgeon:CEDRIC CUEVAS; Location:[facility] OR     SINUS SURGERY  10/1/01    ethmoidectomy chronic sinusitis       Family History   Problem Relation Age of Onset     Musculoskeletal Disorder Mother         back     Anxiety Disorder Mother      Colon Polyps Mother      Ulcerative Colitis Mother         and ischemic small intestine, surgery     Hypertension Mother      Breast Cancer Mother      Osteoporosis Mother      Diabetes Mother         Type 2, Diagnosed in 2014     Depression Mother         Takes Cymbalta to help with chronic pain + depx     Thyroid Disease Mother         Hypothyroidism     Obesity Mother         Under much better control latter half of 2015     Musculoskeletal Disorder Father         back     Substance Abuse Father      Hypertension Father      Hyperlipidemia Father      Depression Father         Off meds for many years. Seems \"ok\"     Heart Disease Maternal Grandmother      Heart Disease Maternal Grandfather      Psychotic Disorder Paternal Grandfather      Suicide " Paternal Grandfather      Depression Paternal Grandfather         Pediatrician. Committed suicide by pistol in 1990.     Musculoskeletal Disorder Brother         back     Depression Brother         Expressed as anger and moodiness     Substance Abuse Sister      Depression Sister         Mental Health Therapist, yet no anti-depressants?     Anxiety Disorder Sister         Mental Health Therapist, yet no anti-anxiety meds?     Other Cancer Other         Bladder Cancer - Fatal     Substance Abuse Brother      Colon Cancer No family hx of      Crohn's Disease No family hx of      Anesthesia Reaction No family hx of      Cancer No family hx of         No family history of skin cancer       Social History     Socioeconomic History     Marital status: Single     Spouse name: None     Number of children: None     Years of education: None     Highest education level: None   Occupational History     Occupation:      Employer: [employer]     Occupation: paraprofessional     Comment: [employer]     Employer: [employer]   Social Needs     Financial resource strain: None     Food insecurity:     Worry: None     Inability: None     Transportation needs:     Medical: None     Non-medical: None   Tobacco Use     Smoking status: Never Smoker     Smokeless tobacco: Never Used   Substance and Sexual Activity     Alcohol use: Yes     Alcohol/week: 0.0 oz     Drinks per session: 1 or 2     Binge frequency: Weekly     Comment: occ 1/week     Drug use: No     Sexual activity: Never     Partners: Female   Lifestyle     Physical activity:     Days per week: None     Minutes per session: None     Stress: None   Relationships     Social connections:     Talks on phone: None     Gets together: None     Attends Rastafarian service: None     Active member of club or organization: None     Attends meetings of clubs or organizations: None     Relationship status: None     Intimate partner violence:     Fear of current or ex  partner: None     Emotionally abused: None     Physically abused: None     Forced sexual activity: None   Other Topics Concern     Parent/sibling w/ CABG, MI or angioplasty before 65F 55M? No      Service Not Asked     Blood Transfusions Not Asked     Caffeine Concern Not Asked     Occupational Exposure Not Asked     Hobby Hazards Not Asked     Sleep Concern Yes     Stress Concern Yes     Weight Concern Not Asked     Special Diet Not Asked     Back Care Yes     Exercise Not Asked     Bike Helmet Not Asked     Seat Belt Yes     Self-Exams Not Asked   Social History Narrative     None         Allergies   Allergen Reactions     Amoxicillin-Pot Clavulanate Difficulty breathing     Banana Shortness Of Breath     Pt reports organic Banana is okay.      Nitroglycerin Palpitations     Penicillins Anaphylaxis     Provigil [Modafinil] Shortness Of Breath     headache     Gadolinium Hives and Itching     Patient was premedicated for the contrast allergy. He did still have a reaction a few hours after injection. Hives and itching. Dr. Gomez told tech to inform pt he should only have contrast again in the future when premedicated and at a hospital. Not at an outpatient facility.      Ketoconazole      Topical cream caused swelling and itching     Dye [Contrast Dye] Other (See Comments) and Hives     Moderate flushing, CT contrast     Golimumab      Hives, bradycardia, face swelling     Neurontin [Gabapentin] Hives     Moderate hives     Nortriptyline Hives     Varicella Virus Vaccine Live      Rash     Flagyl [Metronidazole Hcl] Palpitations and Hives     Latex Rash     Metronidazole Palpitations, Other (See Comments) and Rash     dizziness (versus ciprofloxacin taken at same time)     No Clinical Screening - See Comments Rash     Nitrile gloves         Current Outpatient Medications:      acetaminophen 500 MG CAPS, Take 500 mg by mouth every 4 hours as needed, Disp: 60 capsule, Rfl:      adalimumab (HUMIRA *CF*)  40 MG/0.4ML pen kit, Inject 0.4 mLs (40 mg) Subcutaneous every 14 days . Hold for signs of infection, then seek medical attention., Disp: 2.4 mL, Rfl: 3     albuterol (PROAIR HFA/PROVENTIL HFA/VENTOLIN HFA) 108 (90 Base) MCG/ACT inhaler, Inhale 2 puffs into the lungs every 4 hours as needed for shortness of breath / dyspnea or wheezing, Disp: 8.5 g, Rfl: 11     ALPRAZolam (XANAX XR) 0.5 MG 24 hr tablet, Take 0.5 mg by mouth every morning, Disp: , Rfl:      aspirin (ASA) 81 MG tablet, Take 81 mg by mouth daily , Disp: , Rfl:      blood glucose monitoring (NO BRAND SPECIFIED) meter device kit, Use to test blood sugar 2 times daily or as directed. Include test strips (2 boxes) with 3 refills, Disp: 1 kit, Rfl: 0     celecoxib (CELEBREX) 200 MG capsule, TAKE 1 CAPSULE 2 TIMES     DAILY AS NEEDED MODERATE   PAIN, Disp: 180 capsule, Rfl: 1     cetirizine (ZYRTEC) 10 MG tablet, Take 1 tablet (10 mg) by mouth At Bedtime, Disp: 30 tablet, Rfl: 11     cholecalciferol (VITAMIN D3) 06449 units (1250 mcg) capsule, Take one capsule every two weeks., Disp: 24 capsule, Rfl: 1     cyanocobalamin (CYANOCOBALAMIN) 1000 MCG/ML injection, Inject 1 mL (1,000 mcg) into the muscle every 30 days, Disp: 10 mL, Rfl: 0     DULoxetine (CYMBALTA) 60 MG capsule, Take 60 mg by mouth daily, Disp: , Rfl:      EPINEPHrine (EPIPEN/ADRENACLICK/OR ANY BX GENERIC EQUIV) 0.3 MG/0.3ML injection 2-pack, Inject 0.3 mLs (0.3 mg) into the muscle once as needed for anaphylaxis, Disp: 0.6 mL, Rfl: 3     famotidine (PEPCID) 10 MG tablet, Take 10 mg by mouth Prior to administration of Humira every 2 weeks, Disp: , Rfl:      fluticasone-salmeterol (ADVAIR DISKUS) 250-50 MCG/DOSE inhaler, Inhale 1 puff into the lungs 2 times daily, Disp: 1 Inhaler, Rfl: 11     Ginger, Zingiber officinalis, (GINGER ROOT) 550 MG CAPS capsule, Take 550 mg by mouth Up to three times daily, Disp: , Rfl:      hydrOXYzine (ATARAX) 50 MG tablet, Take 50 mg by mouth 2 times daily For anxiety  and insomnia, Disp: , Rfl:      lamoTRIgine (LAMICTAL) 100 MG tablet, Take 200 mg by mouth daily, Disp: , Rfl:      levothyroxine (SYNTHROID/LEVOTHROID) 75 MCG tablet, TAKE 1 TABLET EVERY MORNING, Disp: 90 tablet, Rfl: 1     Liniments (SALONPAS EX), Externally apply 1 Application topically daily as needed, Disp: , Rfl:      loperamide (IMODIUM) 2 MG capsule, Take 2 mg by mouth 4 times daily as needed for diarrhea, Disp: , Rfl:      melatonin 3 MG tablet, Take 1 mg by mouth At Bedtime, Disp: , Rfl:      metFORMIN (GLUCOPHAGE-XR) 500 MG 24 hr tablet, Take 2 tablets (1,000 mg) by mouth daily (with dinner), Disp: 180 tablet, Rfl: 3     metoclopramide (REGLAN) 5 MG tablet, Take 1 tablet (5 mg) by mouth 3 times daily as needed (nausea), Disp: 20 tablet, Rfl: 3     metoprolol succinate ER (TOPROL-XL) 200 MG 24 hr tablet, TAKE 2 TABLETS (400MG) DAILY (Patient taking differently: Take 200 mg by mouth 2 times daily ), Disp: 180 tablet, Rfl: 0     montelukast (SINGULAIR) 10 MG tablet, Take 1 tablet (10 mg) by mouth every evening, Disp: 90 tablet, Rfl: 1     omega-3 acid ethyl esters (LOVAZA) 1 g capsule, TAKE 2 CAPSULES BY MOUTH TWICE DAILY., Disp: 360 capsule, Rfl: 2     omeprazole 20 MG tablet, Take 20 mg by mouth daily , Disp: , Rfl:      ondansetron (ZOFRAN) 8 MG tablet, TAKE 1 TABLET BY MOUTH EVERY 8 HOURS AS NEEDED FOR NAUSEA OR VOMITING, Disp: 20 tablet, Rfl: 4     order for DME, Equipment being ordered: lumbosacral belt/brace, Disp: 1 Units, Rfl: 0     order for DME, Respironics REMSTAR 60 Series Auto CPAP 9-13 cm H2O, Wisp nasal mask w/a large cushion and a chinstrap, Disp: , Rfl:      oxyCODONE (ROXICODONE) 5 MG tablet, Take 1-2 tablets (5-10 mg) by mouth every 6 hours as needed for pain (maximum 4 tablet(s) per day), Disp: 35 tablet, Rfl: 0     pregabalin (LYRICA) 150 MG capsule, Take 1 capsule (150 mg) by mouth 3 times daily, Disp: 90 capsule, Rfl: 5     pseudoePHEDrine (SUDAFED) 120 MG 12 hr tablet, Take 1 tablet  "(120 mg) by mouth every 12 hours, Disp: 28 tablet, Rfl: 0     QUEtiapine (SEROQUEL) 25 MG tablet, Take 25 mg by mouth 4 times daily as needed, Disp: , Rfl:      ramipril (ALTACE) 10 MG capsule, Take 1 capsule (10 mg) by mouth daily, Disp: 90 capsule, Rfl: 1     rizatriptan (MAXALT-MLT) 5 MG ODT tab, Take 1 tablet (5 mg) by mouth at onset of headache for migraine, Disp: 30 tablet, Rfl: 5     rosuvastatin (CRESTOR) 40 MG tablet, TAKE 1 TABLET DAILY, Disp: 90 tablet, Rfl: 1     syringe, disposable, (BD TUBERCULIN SYRINGE) 1 ML MISC, Equipment being ordered: 1 ml tuberculin syringes to be used for Vitamin B12 injections., Disp: 12 each, Rfl: 1     vitamin C (ASCORBIC ACID) 500 MG tablet, Take 500 mg by mouth daily, Disp: , Rfl:       Physical Exam:  /87   Pulse 83   Resp 18   Ht 1.956 m (6' 5\")   Wt 149.7 kg (330 lb)   SpO2 97%   BMI 39.13 kg/m     GENERAL: Well developed, well nourished, alert, and in no apparent distress.  HEENT: Normocephalic, atraumatic. PERRL, EOMI. Oral mucosa is moist. No perioral cyanosis.  NECK: supple, no masses, no thyromegaly.  RESP:  Normal respiratory effort.  CTAB.  No rales, wheezes, rhonchi.  No cyanosis or clubbing.  CV: Normal S1, S2, regular rhythm, normal rate. No murmur.  No LE edema.   ABDOMEN:  Soft, non-tender, non-distended.   SKIN: warm and dry. No rash.  NEURO: AAOx3.  Normal gait.  No focal neuro deficits.  PSYCH: mentation appears normal. and affect normal/bright    Results:  None.    Assessment and Plan:   Joel Pineda is a 44 year old male with a history of ankylosing spondylitis on Humira, acid reflux, allergies, and asthma who presents to pulmonary clinic today for further follow-up of asthma.  At our last visit, we attempted to de-escalate Singulair.  This was unsuccessful as it resulted in return of his asthma symptoms requiring more frequent rescue inhaler use. Since restarting Singulair he has returned to his respiratory symptom baseline and been " without acute exacerbation of asthma.  At this point I would like for him to continue on Advair 250/50, Singulair, Zyrtec, and albuterol as needed.    Questions and concerns were answered to the patient's satisfaction.  he was provided with my contact information should new questions or concerns arise in the interim.  he should return to clinic in 6 months.  He is up to date on a seasonal influenza vaccine, Prevnar (2015) and Pneumovax (2016).    Ericka Segovia MD  Pulmonary and Critical Care Medicine    The above note was dictated using voice recognition software and may include typographical errors. Please contact the author for any clarifications.                                        Sincerely,        Elaine Segovia MD

## 2019-12-12 NOTE — PATIENT INSTRUCTIONS
If you have had any blood work, imaging or other testing completed we will be in touch within 1-2 weeks regarding the results.     If you need refills please contact your pharmacy.     It was a pleasure meeting with you today. Please let us know if there is anything else we can do for you so that we can be sure you are leaving completely satisfied with your care experience.     If you have any questions, concerns or need to schedule a follow up, please contact us at [phone] and our fax [phone].    Your Pulmonology Team at [facility]

## 2019-12-12 NOTE — PROGRESS NOTES
Pulmonary Clinic Return Visit  History of Present Illness    Mr. Pineda is a 44-year-old male with a history of ankylosing spondylitis on Humira, acid reflux, allergies, and asthma who presents to pulmonary clinic today for further follow-up of asthma.  We last visited about 6 months ago at which point he was doing well. His asthma had been decently well controlled on Advair 250/50, Singulair, and albuterol as needed and pulmonary function has historically been normal.  At our last visit, we made the decision to attempt to de-escalate therapy by stopping Singulair.      He attempted to stop Singulair and nearly immediately noted increase in asthma symptoms of cough and chest tightness.  He required more frequent use of his albuterol rescue inhaler up to 4x per week.  He re-initiated his Singulair and has done fine since then.  He is using his rescue inhaler now once per week with good relief of symptoms.  He denies any new or worsening shortness of breath, or cough.  He continues to take Zyrtec daily for allergies.  He has not required prednisone for acute asthma exacerbation and has not had any episodes of pneumonia since we last visited.      He is a never smoker.      Review of Systems:  10 of 14 systems reviewed and are negative unless otherwise stated in HPI.    Past Medical History:   Diagnosis Date     Acne      Allergic state      Anxiety      Bipolar 2 disorder (H)      Chest pain     Chest pain, regulated w/BP meds. Clear arteries.     Chronic pain      Depressive disorder      Diabetes (H)      Diverticulosis      Gastroesophageal reflux disease      Hypertension      IBS (irritable bowel syndrome)      Intracranial arachnoid cyst      Polyneuropathy      Pulmonary embolism (H)      Skin exam, screening for cancer 12/3/2013     Sleep apnea      Uncomplicated asthma        Past Surgical History:   Procedure Laterality Date     BACK SURGERY  10/07    lumbar discectomy L5-S1     COLONOSCOPY      Note:  "colonoscopy scheduled with [facility] on Friday, 9/4/15     COSMETIC SURGERY  2012    Nose Exterior - functional     GI SURGERY  August 2013    Sigmoidectomy     HERNIA REPAIR, UMBILICAL  8/23/11    [place], Dr. Evan Beavers     INCISION AND DRAINAGE, ABSCESS, COMPLEX  8/23/11    umbilical, Dr. Evan Beavers     LAPAROSCOPIC ASSISTED COLECTOMY LEFT (DESCENDING)  8/15/2013    Procedure: LAPAROSCOPIC ASSISTED COLECTOMY LEFT (DESCENDING);  Laparoscopic Hand Assisted Sigmoid Resection, Mobilization of Splenic Fissure, coloproctoscopy, *Latex Free Room* Anesthesia General with Pain block  ;  Surgeon: Aurora Justice MD;  Location: [facility] OR     NERVE SURGERY  8/18/11    RF ablation @ L3-S1 @ [facility]     RECONSTRUCT NOSE AND SEPTUM (FUNCTIONAL)  10/14/2011    Procedure:RECONSTRUCT NOSE AND SEPTUM (FUNCTIONAL); Functional Septorhinoplasty, Turbinate Reduction, ; Surgeon:CEDRIC CUEVAS; Location:[facility] OR     SINUS SURGERY  10/1/01    ethmoidectomy chronic sinusitis       Family History   Problem Relation Age of Onset     Musculoskeletal Disorder Mother         back     Anxiety Disorder Mother      Colon Polyps Mother      Ulcerative Colitis Mother         and ischemic small intestine, surgery     Hypertension Mother      Breast Cancer Mother      Osteoporosis Mother      Diabetes Mother         Type 2, Diagnosed in 2014     Depression Mother         Takes Cymbalta to help with chronic pain + depx     Thyroid Disease Mother         Hypothyroidism     Obesity Mother         Under much better control latter half of 2015     Musculoskeletal Disorder Father         back     Substance Abuse Father      Hypertension Father      Hyperlipidemia Father      Depression Father         Off meds for many years. Seems \"ok\"     Heart Disease Maternal Grandmother      Heart Disease Maternal Grandfather      Psychotic Disorder Paternal Grandfather      Suicide Paternal Grandfather      Depression Paternal Grandfather         Pediatrician. Committed " suicide by pistol in 1990.     Musculoskeletal Disorder Brother         back     Depression Brother         Expressed as anger and moodiness     Substance Abuse Sister      Depression Sister         Mental Health Therapist, yet no anti-depressants?     Anxiety Disorder Sister         Mental Health Therapist, yet no anti-anxiety meds?     Other Cancer Other         Bladder Cancer - Fatal     Substance Abuse Brother      Colon Cancer No family hx of      Crohn's Disease No family hx of      Anesthesia Reaction No family hx of      Cancer No family hx of         No family history of skin cancer       Social History     Socioeconomic History     Marital status: Single     Spouse name: None     Number of children: None     Years of education: None     Highest education level: None   Occupational History     Occupation:      Employer: [employer]     Occupation: paraprofessional     Comment: [employer]     Employer: [employer]   Social Needs     Financial resource strain: None     Food insecurity:     Worry: None     Inability: None     Transportation needs:     Medical: None     Non-medical: None   Tobacco Use     Smoking status: Never Smoker     Smokeless tobacco: Never Used   Substance and Sexual Activity     Alcohol use: Yes     Alcohol/week: 0.0 oz     Drinks per session: 1 or 2     Binge frequency: Weekly     Comment: occ 1/week     Drug use: No     Sexual activity: Never     Partners: Female   Lifestyle     Physical activity:     Days per week: None     Minutes per session: None     Stress: None   Relationships     Social connections:     Talks on phone: None     Gets together: None     Attends [employer] service: None     Active member of club or organization: None     Attends meetings of clubs or organizations: None     Relationship status: None     Intimate partner violence:     Fear of current or ex partner: None     Emotionally abused: None     Physically abused: None     Forced sexual  activity: None   Other Topics Concern     Parent/sibling w/ CABG, MI or angioplasty before 65F 55M? No      Service Not Asked     Blood Transfusions Not Asked     Caffeine Concern Not Asked     Occupational Exposure Not Asked     Hobby Hazards Not Asked     Sleep Concern Yes     Stress Concern Yes     Weight Concern Not Asked     Special Diet Not Asked     Back Care Yes     Exercise Not Asked     Bike Helmet Not Asked     Seat Belt Yes     Self-Exams Not Asked   Social History Narrative     None         Allergies   Allergen Reactions     Amoxicillin-Pot Clavulanate Difficulty breathing     Banana Shortness Of Breath     Pt reports organic Banana is okay.      Nitroglycerin Palpitations     Penicillins Anaphylaxis     Provigil [Modafinil] Shortness Of Breath     headache     Gadolinium Hives and Itching     Patient was premedicated for the contrast allergy. He did still have a reaction a few hours after injection. Hives and itching. Dr. Gomez told tech to inform pt he should only have contrast again in the future when premedicated and at a hospital. Not at an outpatient facility.      Ketoconazole      Topical cream caused swelling and itching     Dye [Contrast Dye] Other (See Comments) and Hives     Moderate flushing, CT contrast     Golimumab      Hives, bradycardia, face swelling     Neurontin [Gabapentin] Hives     Moderate hives     Nortriptyline Hives     Varicella Virus Vaccine Live      Rash     Flagyl [Metronidazole Hcl] Palpitations and Hives     Latex Rash     Metronidazole Palpitations, Other (See Comments) and Rash     dizziness (versus ciprofloxacin taken at same time)     No Clinical Screening - See Comments Rash     Nitrile gloves         Current Outpatient Medications:      acetaminophen 500 MG CAPS, Take 500 mg by mouth every 4 hours as needed, Disp: 60 capsule, Rfl:      adalimumab (HUMIRA *CF*) 40 MG/0.4ML pen kit, Inject 0.4 mLs (40 mg) Subcutaneous every 14 days . Hold for signs  of infection, then seek medical attention., Disp: 2.4 mL, Rfl: 3     albuterol (PROAIR HFA/PROVENTIL HFA/VENTOLIN HFA) 108 (90 Base) MCG/ACT inhaler, Inhale 2 puffs into the lungs every 4 hours as needed for shortness of breath / dyspnea or wheezing, Disp: 8.5 g, Rfl: 11     ALPRAZolam (XANAX XR) 0.5 MG 24 hr tablet, Take 0.5 mg by mouth every morning, Disp: , Rfl:      aspirin (ASA) 81 MG tablet, Take 81 mg by mouth daily , Disp: , Rfl:      blood glucose monitoring (NO BRAND SPECIFIED) meter device kit, Use to test blood sugar 2 times daily or as directed. Include test strips (2 boxes) with 3 refills, Disp: 1 kit, Rfl: 0     celecoxib (CELEBREX) 200 MG capsule, TAKE 1 CAPSULE 2 TIMES     DAILY AS NEEDED MODERATE   PAIN, Disp: 180 capsule, Rfl: 1     cetirizine (ZYRTEC) 10 MG tablet, Take 1 tablet (10 mg) by mouth At Bedtime, Disp: 30 tablet, Rfl: 11     cholecalciferol (VITAMIN D3) 02796 units (1250 mcg) capsule, Take one capsule every two weeks., Disp: 24 capsule, Rfl: 1     cyanocobalamin (CYANOCOBALAMIN) 1000 MCG/ML injection, Inject 1 mL (1,000 mcg) into the muscle every 30 days, Disp: 10 mL, Rfl: 0     DULoxetine (CYMBALTA) 60 MG capsule, Take 60 mg by mouth daily, Disp: , Rfl:      EPINEPHrine (EPIPEN/ADRENACLICK/OR ANY BX GENERIC EQUIV) 0.3 MG/0.3ML injection 2-pack, Inject 0.3 mLs (0.3 mg) into the muscle once as needed for anaphylaxis, Disp: 0.6 mL, Rfl: 3     famotidine (PEPCID) 10 MG tablet, Take 10 mg by mouth Prior to administration of Humira every 2 weeks, Disp: , Rfl:      fluticasone-salmeterol (ADVAIR DISKUS) 250-50 MCG/DOSE inhaler, Inhale 1 puff into the lungs 2 times daily, Disp: 1 Inhaler, Rfl: 11     Ginger, Zingiber officinalis, (GINGER ROOT) 550 MG CAPS capsule, Take 550 mg by mouth Up to three times daily, Disp: , Rfl:      hydrOXYzine (ATARAX) 50 MG tablet, Take 50 mg by mouth 2 times daily For anxiety and insomnia, Disp: , Rfl:      lamoTRIgine (LAMICTAL) 100 MG tablet, Take 200 mg by  mouth daily, Disp: , Rfl:      levothyroxine (SYNTHROID/LEVOTHROID) 75 MCG tablet, TAKE 1 TABLET EVERY MORNING, Disp: 90 tablet, Rfl: 1     Liniments (SALONPAS EX), Externally apply 1 Application topically daily as needed, Disp: , Rfl:      loperamide (IMODIUM) 2 MG capsule, Take 2 mg by mouth 4 times daily as needed for diarrhea, Disp: , Rfl:      melatonin 3 MG tablet, Take 1 mg by mouth At Bedtime, Disp: , Rfl:      metFORMIN (GLUCOPHAGE-XR) 500 MG 24 hr tablet, Take 2 tablets (1,000 mg) by mouth daily (with dinner), Disp: 180 tablet, Rfl: 3     metoclopramide (REGLAN) 5 MG tablet, Take 1 tablet (5 mg) by mouth 3 times daily as needed (nausea), Disp: 20 tablet, Rfl: 3     metoprolol succinate ER (TOPROL-XL) 200 MG 24 hr tablet, TAKE 2 TABLETS (400MG) DAILY (Patient taking differently: Take 200 mg by mouth 2 times daily ), Disp: 180 tablet, Rfl: 0     montelukast (SINGULAIR) 10 MG tablet, Take 1 tablet (10 mg) by mouth every evening, Disp: 90 tablet, Rfl: 1     omega-3 acid ethyl esters (LOVAZA) 1 g capsule, TAKE 2 CAPSULES BY MOUTH TWICE DAILY., Disp: 360 capsule, Rfl: 2     omeprazole 20 MG tablet, Take 20 mg by mouth daily , Disp: , Rfl:      ondansetron (ZOFRAN) 8 MG tablet, TAKE 1 TABLET BY MOUTH EVERY 8 HOURS AS NEEDED FOR NAUSEA OR VOMITING, Disp: 20 tablet, Rfl: 4     order for DME, Equipment being ordered: lumbosacral belt/brace, Disp: 1 Units, Rfl: 0     order for DME, Respironics REMSTAR 60 Series Auto CPAP 9-13 cm H2O, Wisp nasal mask w/a large cushion and a chinstrap, Disp: , Rfl:      oxyCODONE (ROXICODONE) 5 MG tablet, Take 1-2 tablets (5-10 mg) by mouth every 6 hours as needed for pain (maximum 4 tablet(s) per day), Disp: 35 tablet, Rfl: 0     pregabalin (LYRICA) 150 MG capsule, Take 1 capsule (150 mg) by mouth 3 times daily, Disp: 90 capsule, Rfl: 5     pseudoePHEDrine (SUDAFED) 120 MG 12 hr tablet, Take 1 tablet (120 mg) by mouth every 12 hours, Disp: 28 tablet, Rfl: 0     QUEtiapine (SEROQUEL)  "25 MG tablet, Take 25 mg by mouth 4 times daily as needed, Disp: , Rfl:      ramipril (ALTACE) 10 MG capsule, Take 1 capsule (10 mg) by mouth daily, Disp: 90 capsule, Rfl: 1     rizatriptan (MAXALT-MLT) 5 MG ODT tab, Take 1 tablet (5 mg) by mouth at onset of headache for migraine, Disp: 30 tablet, Rfl: 5     rosuvastatin (CRESTOR) 40 MG tablet, TAKE 1 TABLET DAILY, Disp: 90 tablet, Rfl: 1     syringe, disposable, (BD TUBERCULIN SYRINGE) 1 ML MISC, Equipment being ordered: 1 ml tuberculin syringes to be used for Vitamin B12 injections., Disp: 12 each, Rfl: 1     vitamin C (ASCORBIC ACID) 500 MG tablet, Take 500 mg by mouth daily, Disp: , Rfl:       Physical Exam:  /87   Pulse 83   Resp 18   Ht 1.956 m (6' 5\")   Wt 149.7 kg (330 lb)   SpO2 97%   BMI 39.13 kg/m    GENERAL: Well developed, well nourished, alert, and in no apparent distress.  HEENT: Normocephalic, atraumatic. PERRL, EOMI. Oral mucosa is moist. No perioral cyanosis.  NECK: supple, no masses, no thyromegaly.  RESP:  Normal respiratory effort.  CTAB.  No rales, wheezes, rhonchi.  No cyanosis or clubbing.  CV: Normal S1, S2, regular rhythm, normal rate. No murmur.  No LE edema.   ABDOMEN:  Soft, non-tender, non-distended.   SKIN: warm and dry. No rash.  NEURO: AAOx3.  Normal gait.  No focal neuro deficits.  PSYCH: mentation appears normal. and affect normal/bright    Results:  None.    Assessment and Plan:   Joel Pineda is a 44 year old male with a history of ankylosing spondylitis on Humira, acid reflux, allergies, and asthma who presents to pulmonary clinic today for further follow-up of asthma.  At our last visit, we attempted to de-escalate Singulair.  This was unsuccessful as it resulted in return of his asthma symptoms requiring more frequent rescue inhaler use. Since restarting Singulair he has returned to his respiratory symptom baseline and been without acute exacerbation of asthma.  At this point I would like for him to continue on " Advair 250/50, Singulair, Zyrtec, and albuterol as needed.    Questions and concerns were answered to the patient's satisfaction.  he was provided with my contact information should new questions or concerns arise in the interim.  he should return to clinic in 6 months.  He is up to date on a seasonal influenza vaccine, Prevnar (2015) and Pneumovax (2016).    Ericka Segovia MD  Pulmonary and Critical Care Medicine    The above note was dictated using voice recognition software and may include typographical errors. Please contact the author for any clarifications.

## 2019-12-13 ASSESSMENT — ASTHMA QUESTIONNAIRES: ACT_TOTALSCORE: 19

## 2019-12-13 NOTE — TELEPHONE ENCOUNTER
"S: Calling about pain.  B: Today at [facility] has a 2 cm cyst removed from back at the thoracic level.  Has a 2 x2 pressure dressing over site.  The dressing is clean dry and intact.Patient assumed there would be a tolerable level of at home as no pain medications were offered.   Rates pain a \"6\".  Has Oxycodone at home he was prescribed for ankylosing spondylitis.  He took this and now rates pain a \"2\".  Is on Celebrex and taking Tylenol.     A: Writer suggested to try ice on back to see if he can get some pain relief.  R: Patient will call [facility] in the morning and try ice.  Rosemary Villa RN, [facility]  "

## 2019-12-13 NOTE — TELEPHONE ENCOUNTER
Reason for Disposition    General information question, no triage required and triager able to answer question    Protocols used: INFORMATION ONLY CALL-A-AH

## 2019-12-15 ENCOUNTER — MYC REFILL (OUTPATIENT)
Dept: FAMILY MEDICINE | Facility: CLINIC | Age: 44
End: 2019-12-15

## 2019-12-15 DIAGNOSIS — I10 ESSENTIAL HYPERTENSION WITH GOAL BLOOD PRESSURE LESS THAN 140/90: Chronic | ICD-10-CM

## 2019-12-15 DIAGNOSIS — E78.1 HYPERTRIGLYCERIDEMIA: ICD-10-CM

## 2019-12-15 DIAGNOSIS — I10 HYPERTENSION GOAL BP (BLOOD PRESSURE) < 140/90: Chronic | ICD-10-CM

## 2019-12-15 RX ORDER — RAMIPRIL 10 MG/1
10 CAPSULE ORAL DAILY
Qty: 90 CAPSULE | Refills: 1 | Status: CANCELLED | OUTPATIENT
Start: 2019-12-15

## 2019-12-15 RX ORDER — OMEGA-3-ACID ETHYL ESTERS 1 G/1
CAPSULE, LIQUID FILLED ORAL
Qty: 360 CAPSULE | Refills: 2 | Status: CANCELLED | OUTPATIENT
Start: 2019-12-15

## 2019-12-15 RX ORDER — METOPROLOL SUCCINATE 200 MG/1
TABLET, EXTENDED RELEASE ORAL
Qty: 180 TABLET | Refills: 0 | Status: CANCELLED | OUTPATIENT
Start: 2019-12-15

## 2019-12-16 ENCOUNTER — OFFICE VISIT (OUTPATIENT)
Dept: FAMILY MEDICINE | Facility: CLINIC | Age: 44
End: 2019-12-16
Payer: MEDICARE

## 2019-12-16 VITALS
RESPIRATION RATE: 18 BRPM | SYSTOLIC BLOOD PRESSURE: 126 MMHG | WEIGHT: 232 LBS | DIASTOLIC BLOOD PRESSURE: 88 MMHG | HEART RATE: 81 BPM | OXYGEN SATURATION: 96 % | HEIGHT: 77 IN | TEMPERATURE: 98.6 F | BODY MASS INDEX: 27.39 KG/M2

## 2019-12-16 DIAGNOSIS — M45.8 ANKYLOSING SPONDYLITIS OF SACRAL REGION (H): ICD-10-CM

## 2019-12-16 DIAGNOSIS — G62.9 NEUROPATHY: ICD-10-CM

## 2019-12-16 DIAGNOSIS — E78.1 HYPERTRIGLYCERIDEMIA: ICD-10-CM

## 2019-12-16 DIAGNOSIS — I10 ESSENTIAL HYPERTENSION WITH GOAL BLOOD PRESSURE LESS THAN 140/90: Chronic | ICD-10-CM

## 2019-12-16 DIAGNOSIS — E78.5 HYPERLIPIDEMIA LDL GOAL <70: ICD-10-CM

## 2019-12-16 DIAGNOSIS — M51.369 DDD (DEGENERATIVE DISC DISEASE), LUMBAR: Primary | ICD-10-CM

## 2019-12-16 DIAGNOSIS — M76.62 ACHILLES TENDINITIS OF LEFT LOWER EXTREMITY: ICD-10-CM

## 2019-12-16 PROCEDURE — 99214 OFFICE O/P EST MOD 30 MIN: CPT | Performed by: FAMILY MEDICINE

## 2019-12-16 RX ORDER — OMEGA-3-ACID ETHYL ESTERS 1 G/1
CAPSULE, LIQUID FILLED ORAL
Qty: 360 CAPSULE | Refills: 3 | Status: SHIPPED | OUTPATIENT
Start: 2019-12-16 | End: 2020-03-09

## 2019-12-16 RX ORDER — OXYCODONE HYDROCHLORIDE 5 MG/1
5-10 TABLET ORAL EVERY 6 HOURS PRN
Qty: 35 TABLET | Refills: 0 | Status: SHIPPED | OUTPATIENT
Start: 2019-12-16 | End: 2020-01-08

## 2019-12-16 RX ORDER — METOPROLOL SUCCINATE 200 MG/1
200 TABLET, EXTENDED RELEASE ORAL 2 TIMES DAILY
Qty: 180 TABLET | Refills: 1 | Status: SHIPPED | OUTPATIENT
Start: 2019-12-16 | End: 2020-02-28

## 2019-12-16 RX ORDER — RAMIPRIL 10 MG/1
10 CAPSULE ORAL DAILY
Qty: 90 CAPSULE | Refills: 1 | Status: SHIPPED | OUTPATIENT
Start: 2019-12-16 | End: 2020-03-15

## 2019-12-16 RX ORDER — PREGABALIN 150 MG/1
150 CAPSULE ORAL 2 TIMES DAILY
Qty: 60 CAPSULE | Refills: 5 | Status: SHIPPED | OUTPATIENT
Start: 2019-12-16 | End: 2020-05-24

## 2019-12-16 ASSESSMENT — MIFFLIN-ST. JEOR: SCORE: 2049.73

## 2019-12-16 NOTE — PATIENT INSTRUCTIONS
At [facility], we strive to deliver an exceptional experience to you, every time we see you. If you receive a survey, please complete it as we do value your feedback.  If you have MyChart, you can expect to receive results automatically within 24 hours of their completion.  Your provider will send a note interpreting your results as well.   If you do not have MyChart, you should receive your results in about a week by mail.    Your care team:     Family Medicine   SHERWIN Ruelas MD Emily Bunt, JOCELYN CASTRO   S. MD Felipa Narayan MD Angela Wermerskirchen, MD    Internal Medicine  Kanu Knapp MD coming 2020     Clinic hours: Monday - Wednesday 7 am-7 pm   Thursdays and Fridays 7 am-5 pm.     [facility]: Monday - Friday 11 am-9 pm,   Saturday and Sunday 9 am-5 pm.    [facility]: Monday -Thursday 8 am-8 pm; Friday 8 am-6 pm; Saturday and Sunday 9 am-5 pm.     [facility]: Monday - Thursday 8 am - 7 pm; Friday 8 am - 6 pm    Clinic: [phone]   [facility]: [phone] m [facility]: [phone]

## 2019-12-16 NOTE — TELEPHONE ENCOUNTER
"Requested Prescriptions   Pending Prescriptions Disp Refills     ramipril (ALTACE) 10 MG capsule 90 capsule 1     Sig: Take 1 capsule (10 mg) by mouth daily       ACE Inhibitors (Including Combos) Protocol Passed - 12/16/2019  6:21 AM        Passed - Blood pressure under 140/90 in past 12 months     BP Readings from Last 3 Encounters:   12/12/19 125/87   12/02/19 130/82   11/06/19 115/80                 Passed - Recent (12 mo) or future (30 days) visit within the authorizing provider's specialty     Patient has had an office visit with the authorizing provider or a provider within the authorizing providers department within the previous 12 mos or has a future within next 30 days. See \"Patient Info\" tab in inbasket, or \"Choose Columns\" in Meds & Orders section of the refill encounter.              Passed - Medication is active on med list        Passed - Patient is age 18 or older        Passed - Normal serum creatinine on file in past 12 months     Recent Labs   Lab Test 09/26/19  1010   CR 0.83             Passed - Normal serum potassium on file in past 12 months     Recent Labs   Lab Test 08/08/19  1658   POTASSIUM 4.5             metoprolol succinate ER (TOPROL-XL) 200 MG 24 hr tablet 180 tablet 0     Sig: TAKE 2 TABLETS (400MG) DAILY       Beta-Blockers Protocol Passed - 12/16/2019  6:21 AM        Passed - Blood pressure under 140/90 in past 12 months     BP Readings from Last 3 Encounters:   12/12/19 125/87   12/02/19 130/82   11/06/19 115/80                 Passed - Patient is age 6 or older        Passed - Recent (12 mo) or future (30 days) visit within the authorizing provider's specialty     Patient has had an office visit with the authorizing provider or a provider within the authorizing providers department within the previous 12 mos or has a future within next 30 days. See \"Patient Info\" tab in inbasket, or \"Choose Columns\" in Meds & Orders section of the refill encounter.              Passed - " "Medication is active on med list        omega-3 acid ethyl esters (LOVAZA) 1 g capsule 360 capsule 2     Sig: TAKE 2 CAPSULES BY MOUTH TWICE DAILY.       Antihyperlipidemic agents Passed - 12/16/2019  6:21 AM        Passed - Lipid panel on file in past 12 mos     Recent Labs   Lab Test 09/13/19  0943  12/03/14  0958   CHOL 150   < > 189   TRIG 468*   < > 487*   HDL 33*   < > 27*   LDL Cannot estimate LDL when triglyceride exceeds 400 mg/dL  64   < > Cannot estimate LDL when triglyceride exceeds 400 mg/dL  108   NHDL 117   < >  --    VLDL  --   --  Cannot estimate VLDL when triglyceride exceeds 400 mg/dL.   CHOLHDLRATIO  --   --  7.0*    < > = values in this interval not displayed.               Passed - Normal serum ALT on record in past 12 mos     Recent Labs   Lab Test 09/26/19  1010   ALT 53             Passed - Recent (12 mo) or future (30 days) visit within the authorizing provider's specialty     Patient has had an office visit with the authorizing provider or a provider within the authorizing providers department within the previous 12 mos or has a future within next 30 days. See \"Patient Info\" tab in inbasket, or \"Choose Columns\" in Meds & Orders section of the refill encounter.              Passed - Medication is active on med list        Passed - Patient is age 18 years or older          ramipril (ALTACE) 10 MG capsule  Last Written Prescription Date:  9/24/19  Last Fill Quantity: 90,  # refills: 1   Last office visit: 12/2/2019 with prescribing provider:  Dr. Lyles   Future Office Visit:   Next 5 appointments (look out 90 days)    Dec 16, 2019 12:40 PM CST  Office Visit with Ksenia Lyles MD  [facility] ([facility]) [address] [place] [id number] [phone]             metoprolol succinate ER (TOPROL-XL) 200 MG 24 hr tablet  Last Written Prescription Date:  9/24/19  Last Fill Quantity: 180,  # refills: 0   Last office visit: 12/2/2019 " with prescribing provider:  Dr. Lyles   Future Office Visit:   Next 5 appointments (look out 90 days)    Dec 16, 2019 12:40 PM CST  [facility] Visit [facility] [address] [place] [id number] [phone]             omega-3 acid ethyl esters (LOVAZA) 1 g capsule  Last Written Prescription Date:  9/24/19  Last Fill Quantity: 360,  # refills: 2   Last office visit: 12/2/2019 with prescribing provider:  Dr. Lyles   Future Office Visit:   Next 5 appointments (look out 90 days)    Dec 16, 2019 12:40 PM CST  [facility] Visit with [facility]  [facility] [address] [place] [id number] [phone]

## 2019-12-16 NOTE — PROGRESS NOTES
Subjective     Joel Pineda is a 44 year old male who presents to clinic today for the following health issues:    HPI     1.Look at a cyst in back - had minor procedure last week.  2. Forms to complete for [employer].   Chronic Pain Follow-Up       Type / Location of Pain: Left ankle   Analgesia/pain control:       Recent changes:  worse      Overall control: Inadequate pain control without brace   Activity level/function:      Daily activities:  Can do most things most days, with some rest    Work:  Unable to work  Adverse effects:  No  Adherance    Taking medication as directed?  Yes    Participating in other treatments: yes - physical therapy   Risk Factors:    Sleep:  Poor    Mood/anxiety:  worsened    Recent family or social stressors:  Yes     Other aggravating factors: Motion or at rest   PHQ-9 SCORE 3/25/2019 7/1/2019 10/31/2019   PHQ-9 Total Score - - -   PHQ-9 Total Score MyChart - - 10 (Moderate depression)   PHQ-9 Total Score 12 15 10   PHQ-9 Total Score - - -     YUDI-7 SCORE 1/3/2019 3/25/2019 7/1/2019   Total Score - - -   Total Score - - -   Total Score 18 10 15   Total Score BEH Adult - - -     Encounter-Level CSA - 04/04/2017:    Controlled Substance Agreement - Scan on 4/11/2017  1:30 PM: CONTROLLED SUBSTANCE AGREEMENT     Patient-Level CSA:    Controlled Substance Agreement - Opioid - Scan on 6/12/2019  6:16 PM       SUBJECTIVE:  Here today in follow-up of ongoing medical issues.  He has some paperwork to get Lyrica covered through [employer].  Has back down to a dosage of 1 tablet twice daily because 3 times, though beneficial, made him very foggy and cloudy.  So he is managing with it twice daily.  Recently been dealing with some left Achilles tendinitis and is doing a lot better with the brace.  Had an epithelial cyst removed by outside dermatology recently.  Seems to be healing well.  Due for refill of some medications for cholesterol and blood pressure.    Review of systems otherwise  "negative.  Past medical, family, and social history reviewed and updated in chart.    OBJECTIVE:  /88 (BP Location: Right arm, Patient Position: Sitting, Cuff Size: Adult Large)   Pulse 81   Temp 98.6  F (37  C) (Oral)   Resp 18   Ht 1.956 m (6' 5\")   Wt 105.2 kg (232 lb)   SpO2 96%   BMI 27.51 kg/m    Alert, pleasant, upbeat, and in no apparent discomfort.  S1 and S2 normal, no murmurs, clicks, gallops or rubs. Regular rate and rhythm. Chest is clear; no wheezes or rales. No edema or JVD.  Left ankle with little bit of tenderness scattered throughout the Achilles insertion  Past labs reviewed with the patient.     ASSESSMENT / PLAN:  (M51.36) DDD (degenerative disc disease), lumbar  (primary encounter diagnosis)  Comment: Stable on current dosage.  [employer] paperwork filled out and will be faxed in  Plan: pregabalin (LYRICA) 150 MG capsule, oxyCODONE         (ROXICODONE) 5 MG tablet            (G62.9) Neuropathy  Comment: As above  Plan: pregabalin (LYRICA) 150 MG capsule            (M45.8) Ankylosing spondylitis of sacral region (H)  Comment: Stable and followed  Plan: oxyCODONE (ROXICODONE) 5 MG tablet            (M76.62) Achilles tendinitis of left lower extremity  Comment: Continue wearing brace and work on eccentric stretching  Plan:     (I10) Essential hypertension with goal blood pressure less than 140/90  Comment:   Plan: ramipril (ALTACE) 10 MG capsule, metoprolol         succinate ER (TOPROL-XL) 200 MG 24 hr tablet            (E78.5) Hyperlipidemia LDL goal <70  Comment:   Plan: omega-3 acid ethyl esters (LOVAZA) 1 g capsule            (E78.1) Hypertriglyceridemia  Comment:   Plan: omega-3 acid ethyl esters (LOVAZA) 1 g capsule            Follow up 3 months  JA Lyles MD    (Chart documentation completed in part with Dragon voice-recognition software.  Even though reviewed some grammatical, spelling, and word errors may remain.)       "

## 2019-12-17 ENCOUNTER — TELEPHONE (OUTPATIENT)
Dept: RHEUMATOLOGY | Facility: CLINIC | Age: 44
End: 2019-12-17

## 2019-12-17 ENCOUNTER — NURSE TRIAGE (OUTPATIENT)
Dept: NURSING | Facility: CLINIC | Age: 44
End: 2019-12-17

## 2019-12-17 ASSESSMENT — ASTHMA QUESTIONNAIRES: ACT_TOTALSCORE: 20

## 2019-12-18 ENCOUNTER — MYC REFILL (OUTPATIENT)
Dept: FAMILY MEDICINE | Facility: CLINIC | Age: 44
End: 2019-12-18

## 2019-12-18 DIAGNOSIS — G43.109 MIGRAINE WITH AURA AND WITHOUT STATUS MIGRAINOSUS, NOT INTRACTABLE: ICD-10-CM

## 2019-12-18 NOTE — TELEPHONE ENCOUNTER
Neal Amanda / RN: Please call to notify Mr. Pineda that he can hold off taking Humira for now; we should discuss in clinic . Can he come 1/2/2020?    Oneil Baxter MD  12/18/2019 4:39 PM

## 2019-12-18 NOTE — TELEPHONE ENCOUNTER
He administers humira every 2 weeks and within a few hours his mouth and tongue started burning. It may be a little swollen. He took 50 mg of benadryl just before he called.  He has an Epi pen on hand for allergic reactions he has had in the past. He doesn't want to call the ambulance right now he is going to call a neighbor first. I told him if symptoms change to call 911.    I called patient back, he got a hold of the neighbor and he administered his epi pen and is on his way in to be evaluated in the ED at [facility].    Helena Hoover RN/ [facility]    Forward to Dr Baxter    Reason for Disposition    Pain in tongue, mouth, or tooth    Additional Information    Negative: Started suddenly after sting from bee, wasp, or yellow jacket    Negative: Started suddenly after taking a medicine or allergic food (e.g., nuts)    Negative: Wheezing, stridor, hoarseness, or difficulty breathing    Negative: Facial swelling    Negative: Neck swelling    Negative: Can't swallow normal secretions (e.g., drooling or spitting)    Negative: Taking an ACE Inhibitor medication  (e.g., benazepril/LOTENSIN, captopril/CAPOTEN, enalapril/VASOTEC, lisinopril/ZESTRIL)    Negative: Sounds like a life-threatening emergency to the triager    Negative: Followed a tongue injury    Protocols used: TONGUE SWELLING-A-

## 2019-12-18 NOTE — TELEPHONE ENCOUNTER
Clinic Action Needed: Yes  [facility] Triage Call Yes  Presenting Problem:  He administers humira every 2 weeks and within a few hours his mouth and tongue started burning. It may be a little swollen. He took 50 mg of benadryl just before he called.  He has an Epi pen on hand for allergic reactions he has had in the past. He doesn't want to call the ambulance right now he is going to call a neighbor first. I told him if symptoms change to call 911.    I called patient back, he got a hold of the neighbor and he administered his epi pen and is on his way in to be evaluated in the ED at [facility].    Helena Hoover RN/ [facility]    Forward to Dr Baxter

## 2019-12-18 NOTE — TELEPHONE ENCOUNTER
"Called patient and advised him that his message has been forwarded to Dr. Baxter and we will do our best to get back to him within a timely manner.  Patient verbalized understanding and wanted to update us that he did not end up going to the ED.   He did recover on his own at home with his previous symptoms of tongue burning and \"wicked PND\" resolving after taking Benadryl and Epinephrine.     Neal Amanda RN....12/18/2019 2:22 PM     "

## 2019-12-19 NOTE — TELEPHONE ENCOUNTER
"Requested Prescriptions   Pending Prescriptions Disp Refills     rizatriptan (MAXALT-MLT) 5 MG ODT  Last Written Prescription Date:  10/30/18  Last Fill Quantity: 30,  # refills: 5   Last Office Visit with [facility] P or [facility] prescribing provider:  12/16/19-Trupti   Future Office Visit:    30 tablet 5     Sig: Take 1 tablet (5 mg) by mouth at onset of headache for migraine       Serotonin Agonists Failed - 12/19/2019  8:30 AM        Failed - Serotonin Agonist request needs review.     Please review patient's record. If patient has had 8 or more treatments in the past month, please forward to provider.          Passed - Blood pressure under 140/90 in past 12 months     BP Readings from Last 3 Encounters:   12/16/19 126/88   12/12/19 125/87   12/02/19 130/82                 Passed - Recent (12 mo) or future (30 days) visit within the authorizing provider's specialty     Patient has had an office visit with the authorizing provider or a provider within the authorizing providers department within the previous 12 mos or has a future within next 30 days. See \"Patient Info\" tab in inbasket, or \"Choose Columns\" in Meds & Orders section of the refill encounter.              Passed - Medication is active on med list        Passed - Patient is age 18 or older          "

## 2019-12-19 NOTE — TELEPHONE ENCOUNTER
Left detailed message asking patient if he can come in for an apt on 1/2/19.    Neal Amanda RN....12/19/2019 8:47 AM

## 2019-12-20 NOTE — TELEPHONE ENCOUNTER
Routing refill request to provider for review/approval because:  Serotonin Agonists Failed - 12/19/2019  8:30 AM            Failed - Serotonin Agonist request needs review.       Please review patient's record. If patient has had 8 or more treatments in the past month, please forward to provider.         Deb Coleman RN

## 2019-12-22 RX ORDER — RIZATRIPTAN BENZOATE 5 MG/1
5 TABLET, ORALLY DISINTEGRATING ORAL
Qty: 30 TABLET | Refills: 5 | Status: SHIPPED | OUTPATIENT
Start: 2019-12-22 | End: 2020-12-28

## 2019-12-23 ENCOUNTER — TRANSFERRED RECORDS (OUTPATIENT)
Dept: HEALTH INFORMATION MANAGEMENT | Facility: CLINIC | Age: 44
End: 2019-12-23

## 2019-12-23 ENCOUNTER — TELEPHONE (OUTPATIENT)
Dept: FAMILY MEDICINE | Facility: CLINIC | Age: 44
End: 2019-12-23

## 2019-12-23 NOTE — TELEPHONE ENCOUNTER
Returned call to patient.   He stated he is currently in the ED in [place]. Patient notes he started feeling feverish and sudden worsening of symptoms so he just went in.  Writer agreed with this decision.  Patient will be seen and evaluated in ED.  No further discussion at this point.    Dianne Henderson RN  [facility]

## 2019-12-23 NOTE — TELEPHONE ENCOUNTER
Reason for Call:  Other call back    Detailed comments: Pt states that he has a Groin abscess in the same location as couple of years ago and is currently out of town in [place]. He is just wondering because he is on Humira if he should go to the urgent care there or the ER as he does have some pain. He would like a call back to advise. Thank you.    Phone Number Patient can be reached at: Home number on file [phone] (home)    Best Time: Any    Can we leave a detailed message on this number? YES    Call taken on 12/23/2019 at 12:06 PM by [address]

## 2019-12-26 ENCOUNTER — MYC MEDICAL ADVICE (OUTPATIENT)
Dept: NEUROSURGERY | Facility: CLINIC | Age: 44
End: 2019-12-26

## 2019-12-27 ENCOUNTER — OFFICE VISIT (OUTPATIENT)
Dept: FAMILY MEDICINE | Facility: CLINIC | Age: 44
End: 2019-12-27
Payer: MEDICARE

## 2019-12-27 VITALS
BODY MASS INDEX: 37.19 KG/M2 | WEIGHT: 315 LBS | RESPIRATION RATE: 19 BRPM | HEART RATE: 94 BPM | HEIGHT: 77 IN | DIASTOLIC BLOOD PRESSURE: 86 MMHG | TEMPERATURE: 98.5 F | SYSTOLIC BLOOD PRESSURE: 126 MMHG | OXYGEN SATURATION: 97 %

## 2019-12-27 DIAGNOSIS — R19.7 DIARRHEA, UNSPECIFIED TYPE: ICD-10-CM

## 2019-12-27 DIAGNOSIS — R10.32 LLQ ABDOMINAL PAIN: ICD-10-CM

## 2019-12-27 DIAGNOSIS — L02.214 GROIN ABSCESS: Primary | ICD-10-CM

## 2019-12-27 LAB
BASOPHILS # BLD AUTO: 0.1 10E9/L (ref 0–0.2)
BASOPHILS NFR BLD AUTO: 0.8 %
DIFFERENTIAL METHOD BLD: NORMAL
EOSINOPHIL # BLD AUTO: 0.2 10E9/L (ref 0–0.7)
EOSINOPHIL NFR BLD AUTO: 2.4 %
ERYTHROCYTE [DISTWIDTH] IN BLOOD BY AUTOMATED COUNT: 14.4 % (ref 10–15)
HCT VFR BLD AUTO: 45 % (ref 40–53)
HGB BLD-MCNC: 14.9 G/DL (ref 13.3–17.7)
LYMPHOCYTES # BLD AUTO: 3 10E9/L (ref 0.8–5.3)
LYMPHOCYTES NFR BLD AUTO: 38.1 %
MCH RBC QN AUTO: 28.7 PG (ref 26.5–33)
MCHC RBC AUTO-ENTMCNC: 33.1 G/DL (ref 31.5–36.5)
MCV RBC AUTO: 87 FL (ref 78–100)
MONOCYTES # BLD AUTO: 1.1 10E9/L (ref 0–1.3)
MONOCYTES NFR BLD AUTO: 13.7 %
NEUTROPHILS # BLD AUTO: 3.6 10E9/L (ref 1.6–8.3)
NEUTROPHILS NFR BLD AUTO: 45 %
PLATELET # BLD AUTO: 263 10E9/L (ref 150–450)
RBC # BLD AUTO: 5.2 10E12/L (ref 4.4–5.9)
WBC # BLD AUTO: 8 10E9/L (ref 4–11)

## 2019-12-27 PROCEDURE — 99214 OFFICE O/P EST MOD 30 MIN: CPT | Performed by: PHYSICIAN ASSISTANT

## 2019-12-27 PROCEDURE — 85025 COMPLETE CBC W/AUTO DIFF WBC: CPT | Performed by: PHYSICIAN ASSISTANT

## 2019-12-27 PROCEDURE — 36415 COLL VENOUS BLD VENIPUNCTURE: CPT | Performed by: PHYSICIAN ASSISTANT

## 2019-12-27 RX ORDER — SULFAMETHOXAZOLE/TRIMETHOPRIM 800-160 MG
1 TABLET ORAL 2 TIMES DAILY
COMMUNITY
End: 2020-01-24

## 2019-12-27 ASSESSMENT — MIFFLIN-ST. JEOR: SCORE: 2457.96

## 2019-12-27 ASSESSMENT — PAIN SCALES - GENERAL: PAINLEVEL: MODERATE PAIN (4)

## 2019-12-27 NOTE — TELEPHONE ENCOUNTER
Returned call.    NP looked at imaging.  Continue to follow neurosurgeon's recommendations.  Message left.

## 2019-12-27 NOTE — PROGRESS NOTES
Subjective     Joel Pineda is a 44 year old male who presents to clinic today for the following health issues:    HPI   ED/UC Followup:    Facility:  [facility]  Date of visit: 12/23/19  Reason for visit: perineal cyst   Current Status: feeling sick, swelling       Diarrhea  Onset: ongoing    Description:   Consistency of stool: watery and loose  Blood in stool: no   Number of loose stools in past 24 hours: 2    Progression of Symptoms:  worsening    Accompanying Signs & Symptoms:  Fever: no   Nausea or vomiting; YES- nausea  Abdominal pain: YES  Episodes of constipation: no   Weight loss: no     History:   Ill contacts: no   Recent use of antibiotics: YES- currently on Bactrim   Recent travels: YES- [place]         Recent medication-new or changes(Rx or OTC): YES- started antibiotic    Precipitating factors:   Has had for a while , had CT done    Alleviating factors:   none    Therapies Tried and outcome:  Imodium AD      loperimiade and opiods for chronic pain  Has been having a A lot of diarrhea.    Left lower quadrant pain but doesn't feel like diverticulitis 4/10 pain- had CT abdomen in EMERGENCY DEPARTMENT and found diverticulosis but no diverticulitis or acute abdomen  Had I and D of abscess right groin.   Groin healing but painful    Had some Edema of lower extremities which has since resolved.   Felt feverish last night.   temp 2 degrees below normal today  Reaction to humira a week ago.   No cough. Sneezing is new.   Was in Er 12/23 in [place] 5 days ago- now on bactrim. No drainage from wound  Has had colonosocpy in past   Has seen [facility] in past and hopes to follow up with them for diarrhea and abdominal pain.  Lots of constipation    Patient Active Problem List   Diagnosis     Major depressive disorder, recurrent episode (H)     Intermittent asthma     Hyperlipidemia LDL goal <100     Chronic nonallergic rhinitis     Diverticulosis     GERD (gastroesophageal  reflux disease)     Anxiety     LLOYD (obstructive sleep apnea)- mild (AHI 11)     Intracranial arachnoid cyst     Facet arthritis of cervical region     Acquired hypothyroidism     Bipolar 2 disorder (H)     Chronic midline low back pain without sciatica     Irritable bowel syndrome with diarrhea     B12 deficiency     Essential hypertension with goal blood pressure less than 140/90     Chronic pain syndrome     Ankylosing spondylitis of sacral region (H)     Morbid obesity due to hypertriglyceridemia (H)     Fatty infiltration of liver     DDD (degenerative disc disease), lumbar     Type 2 diabetes mellitus with complication, without long-term current use of insulin (H)     Peripheral polyneuropathy     History of pulmonary embolism     Ingrown toenail     Lipoma of skin and subcutaneous tissue     Hypertriglyceridemia     Past Surgical History:   Procedure Laterality Date     BACK SURGERY  10/07    lumbar discectomy L5-S1     COLONOSCOPY      Note: colonoscopy scheduled with [facility] on Friday, 9/4/15     COSMETIC SURGERY  2012    Nose Exterior - functional     GI SURGERY  August 2013    Sigmoidectomy     HERNIA REPAIR, UMBILICAL  8/23/11    Dr. Evan whiting     INCISION AND DRAINAGE, ABSCESS, COMPLEX  8/23/11    umbilical, Dr. Evan Beavers     LAPAROSCOPIC ASSISTED COLECTOMY LEFT (DESCENDING)  8/15/2013    Procedure: LAPAROSCOPIC ASSISTED COLECTOMY LEFT (DESCENDING);  Laparoscopic Hand Assisted Sigmoid Resection, Mobilization of Splenic Fissure, coloproctoscopy, *Latex Free Room* Anesthesia General with Pain block  ;  Surgeon: Aurora Justice MD;  Location: [facility] OR     NERVE SURGERY  8/18/11    RF ablation @ L3-S1 @ [facility]     RECONSTRUCT NOSE AND SEPTUM (FUNCTIONAL)  10/14/2011    Procedure:RECONSTRUCT NOSE AND SEPTUM (FUNCTIONAL); Functional Septorhinoplasty, Turbinate Reduction, ; Surgeon:CEDRIC CUEVAS; Location:[facility] OR     SINUS SURGERY  10/1/01    ethmoidectomy chronic sinusitis       Social History  "    Tobacco Use     Smoking status: Never Smoker     Smokeless tobacco: Never Used   Substance Use Topics     Alcohol use: Yes     Alcohol/week: 0.0 standard drinks     Drinks per session: 1 or 2     Binge frequency: Weekly     Comment: occ 1/week     Family History   Problem Relation Age of Onset     Musculoskeletal Disorder Mother         back     Anxiety Disorder Mother      Colon Polyps Mother      Ulcerative Colitis Mother         and ischemic small intestine, surgery     Hypertension Mother      Breast Cancer Mother      Osteoporosis Mother      Diabetes Mother         Type 2, Diagnosed in 2014     Depression Mother         Takes Cymbalta to help with chronic pain + depx     Thyroid Disease Mother         Hypothyroidism     Obesity Mother         Under much better control latter half of 2015     Musculoskeletal Disorder Father         back     Substance Abuse Father      Hypertension Father      Hyperlipidemia Father      Depression Father         Off meds for many years. Seems \"ok\"     Heart Disease Maternal Grandmother      Heart Disease Maternal Grandfather      Psychotic Disorder Paternal Grandfather      Suicide Paternal Grandfather      Depression Paternal Grandfather         Pediatrician. Committed suicide by pistol in 1990.     Musculoskeletal Disorder Brother         back     Depression Brother         Expressed as anger and moodiness     Substance Abuse Sister      Depression Sister         Mental Health Therapist, yet no anti-depressants?     Anxiety Disorder Sister         Mental Health Therapist, yet no anti-anxiety meds?     Other Cancer Other         Bladder Cancer - Fatal     Substance Abuse Brother      Colon Cancer No family hx of      Crohn's Disease No family hx of      Anesthesia Reaction No family hx of      Cancer No family hx of         No family history of skin cancer         Current Outpatient Medications   Medication Sig Dispense Refill     acetaminophen 500 MG CAPS Take 500 mg by " mouth every 4 hours as needed 60 capsule      albuterol (PROAIR HFA/PROVENTIL HFA/VENTOLIN HFA) 108 (90 Base) MCG/ACT inhaler Inhale 2 puffs into the lungs every 4 hours as needed for shortness of breath / dyspnea or wheezing 8.5 g 11     ALPRAZolam (XANAX XR) 0.5 MG 24 hr tablet Take 0.5 mg by mouth every morning       aspirin (ASA) 81 MG tablet Take 81 mg by mouth daily        celecoxib (CELEBREX) 200 MG capsule TAKE 1 CAPSULE 2 TIMES     DAILY AS NEEDED MODERATE   PAIN 180 capsule 1     cetirizine (ZYRTEC) 10 MG tablet Take 1 tablet (10 mg) by mouth At Bedtime 30 tablet 11     cholecalciferol (VITAMIN D3) 54038 units (1250 mcg) capsule Take one capsule every two weeks. 24 capsule 1     cyanocobalamin (CYANOCOBALAMIN) 1000 MCG/ML injection Inject 1 mL (1,000 mcg) into the muscle every 30 days 10 mL 0     DULoxetine (CYMBALTA) 60 MG capsule Take 60 mg by mouth daily       EPINEPHrine (EPIPEN/ADRENACLICK/OR ANY BX GENERIC EQUIV) 0.3 MG/0.3ML injection 2-pack Inject 0.3 mLs (0.3 mg) into the muscle once as needed for anaphylaxis 0.6 mL 3     famotidine (PEPCID) 10 MG tablet Take 10 mg by mouth Prior to administration of Humira every 2 weeks       fluticasone-salmeterol (ADVAIR DISKUS) 250-50 MCG/DOSE inhaler Inhale 1 puff into the lungs 2 times daily 1 Inhaler 11     Ginger, Zingiber officinalis, (GINGER ROOT) 550 MG CAPS capsule Take 550 mg by mouth Up to three times daily       hydrOXYzine (ATARAX) 50 MG tablet Take 50 mg by mouth 2 times daily For anxiety and insomnia       lamoTRIgine (LAMICTAL) 100 MG tablet Take 200 mg by mouth daily       levothyroxine (SYNTHROID/LEVOTHROID) 75 MCG tablet TAKE 1 TABLET EVERY MORNING 90 tablet 1     Liniments (SALONPAS EX) Externally apply 1 Application topically daily as needed       loperamide (IMODIUM) 2 MG capsule Take 2 mg by mouth 4 times daily as needed for diarrhea       melatonin 3 MG tablet Take 1 mg by mouth At Bedtime       metFORMIN (GLUCOPHAGE-XR) 500 MG 24 hr  tablet Take 2 tablets (1,000 mg) by mouth daily (with dinner) 180 tablet 3     metoclopramide (REGLAN) 5 MG tablet Take 1 tablet (5 mg) by mouth 3 times daily as needed (nausea) 20 tablet 3     metoprolol succinate ER (TOPROL-XL) 200 MG 24 hr tablet Take 1 tablet (200 mg) by mouth 2 times daily 180 tablet 1     montelukast (SINGULAIR) 10 MG tablet Take 1 tablet (10 mg) by mouth every evening 90 tablet 1     omega-3 acid ethyl esters (LOVAZA) 1 g capsule TAKE 2 CAPSULES BY MOUTH TWICE DAILY. 360 capsule 3     omeprazole 20 MG tablet Take 20 mg by mouth daily        ondansetron (ZOFRAN) 8 MG tablet TAKE 1 TABLET BY MOUTH EVERY 8 HOURS AS NEEDED FOR NAUSEA OR VOMITING 20 tablet 4     order for DME Equipment being ordered: lumbosacral belt/brace 1 Units 0     order for DME Respironics REMSTAR 60 Series Auto CPAP 9-13 cm H2O, Wisp nasal mask w/a large cushion and a chinstrap       oxyCODONE (ROXICODONE) 5 MG tablet Take 1-2 tablets (5-10 mg) by mouth every 6 hours as needed for pain (maximum 4 tablet(s) per day) 35 tablet 0     pregabalin (LYRICA) 150 MG capsule Take 1 capsule (150 mg) by mouth 2 times daily 60 capsule 5     pseudoePHEDrine (SUDAFED) 120 MG 12 hr tablet Take 1 tablet (120 mg) by mouth every 12 hours 28 tablet 0     QUEtiapine (SEROQUEL) 25 MG tablet Take 25 mg by mouth 4 times daily as needed       ramipril (ALTACE) 10 MG capsule Take 1 capsule (10 mg) by mouth daily 90 capsule 1     rizatriptan (MAXALT-MLT) 5 MG ODT Take 1 tablet (5 mg) by mouth at onset of headache for migraine 30 tablet 5     rosuvastatin (CRESTOR) 40 MG tablet TAKE 1 TABLET DAILY 90 tablet 1     sulfamethoxazole-trimethoprim (BACTRIM DS/SEPTRA DS) 800-160 MG tablet Take 1 tablet by mouth 2 times daily       syringe, disposable, (BD TUBERCULIN SYRINGE) 1 ML MISC Equipment being ordered: 1 ml tuberculin syringes to be used for Vitamin B12 injections. 12 each 1     vitamin C (ASCORBIC ACID) 500 MG tablet Take 500 mg by mouth daily        "adalimumab (HUMIRA *CF*) 40 MG/0.4ML pen kit Inject 0.4 mLs (40 mg) Subcutaneous every 14 days . Hold for signs of infection, then seek medical attention. (Patient not taking: Reported on 12/27/2019) 2.4 mL 3     BP Readings from Last 3 Encounters:   12/27/19 126/86   12/16/19 126/88   12/12/19 125/87    Wt Readings from Last 3 Encounters:   12/27/19 146.1 kg (322 lb)   12/16/19 105.2 kg (232 lb)   12/12/19 149.7 kg (330 lb)                      Reviewed and updated as needed this visit by Provider  Tobacco  Allergies  Meds  Problems  Med Hx  Surg Hx  Fam Hx  Soc Hx          Review of Systems   ROS COMP: Constitutional, HEENT, cardiovascular, pulmonary, gi and gu systems are negative, except as otherwise noted.      Objective    /86 (BP Location: Right arm, Patient Position: Chair, Cuff Size: Adult Large)   Pulse 94   Temp 98.5  F (36.9  C) (Oral)   Resp 19   Ht 1.956 m (6' 5\")   Wt 146.1 kg (322 lb)   SpO2 97%   BMI 38.18 kg/m    Body mass index is 38.18 kg/m .  Physical Exam   GENERAL: healthy, alert and no distress  NECK: no adenopathy, no asymmetry, masses, or scars and thyroid normal to palpation  RESP: lungs clear to auscultation - no rales, rhonchi or wheezes  CV: regular rate and rhythm, normal S1 S2, no S3 or S4, no murmur, click or rub, no peripheral edema and peripheral pulses strong  ABDOMEN: soft, nontender, no hepatosplenomegaly, no masses and bowel sounds normal  MS: no gross musculoskeletal defects noted, no edema  Right groin more near buttock with small incisional wound approximately 1 centimeter diameter.  No surrounding erythema or area of fluctuance. No drainage from wound      Diagnostic Test Results:  none         Assessment & Plan     1. Groin abscess  Normal CBC.  Follow up with surgeon since this has been second occurrence   - CBC with platelets differential  - GENERAL SURG ADULT REFERRAL; Future    2. LLQ abdominal pain  Normal CBC - negative CT performed in emergency " department.  Follow up with GI   - CBC with platelets differential  - GASTROENTEROLOGY ADULT REF CONSULT ONLY    3. Diarrhea, unspecified type  No melena or hematochezia.  Ongoing constipation and diagnosis of irritable bowel in past Follow up with GI   - GASTROENTEROLOGY ADULT REF CONSULT ONLY       Patient Instructions   Follow up with general surgery for groin abscess.   Use scoopful miralax daily for constipation  Return urgently if any change in symptoms like increasing pain, fever, vomiting or other change in symptoms         No follow-ups on file.    Faith Camacho PA-C  [facility]

## 2019-12-27 NOTE — RESULT ENCOUNTER NOTE
Kassy Francis  Here are your blood count results.   Things look good.   Please call or MyChart my office with any questions or concerns.    Faith Camacho, PAC

## 2019-12-27 NOTE — PATIENT INSTRUCTIONS
Follow up with general surgery for groin abscess.   Use scoopful miralax daily for constipation  Return urgently if any change in symptoms like increasing pain, fever, vomiting or other change in symptoms

## 2019-12-30 ENCOUNTER — TELEPHONE (OUTPATIENT)
Dept: SURGERY | Facility: CLINIC | Age: 44
End: 2019-12-30

## 2019-12-30 NOTE — TELEPHONE ENCOUNTER
[facility] Call Center    Phone Message    May a detailed message be left on voicemail: yes    Reason for Call: Other:  Pt has a referral for a groin abscess to be removed in general surgery. He is wondering if it would be something better suited for colon and rectal clinic. Please review, and follow up with patient. (Previous Pt of Reshma (2016).     Action Taken: Message routed to:  [facility] ([facility] Colon [facility] Rectal

## 2019-12-31 ENCOUNTER — TELEPHONE (OUTPATIENT)
Dept: SURGERY | Facility: CLINIC | Age: 44
End: 2019-12-31

## 2019-12-31 NOTE — TELEPHONE ENCOUNTER
lvm x1 for pt about appt add on for 1pm on 01/10/2020 with Dr. Zhu in colon rectal. Awaiting pt call back

## 2019-12-31 NOTE — TELEPHONE ENCOUNTER
RECORDS RECEIVED FROM: N/A   DATE RECEIVED: 1/10/2020   NOTES STATUS DETAILS   OFFICE NOTE from referring provider  N/A    OFFICE NOTE from other specialist   Internal 12/27/19 Office visit with Faith Camacho PA-C ([facility])   DISCHARGE SUMMARY from hospital  N/A    DISCHARGE REPORT from the ER N/A    OPERATIVE REPORT  N/A    MEDICATION LIST Internal/ Care Everywhere    LABS     PFC TESTING N/A    ANAL PAP N/A    BIOPSIES/PATHOLOGY RELATED TO DIAGNOSIS N/A    DIAGNOSTIC PROCEDURES     COLONOSCOPY Received 4/26/16 ([facility])  9/4/15 ([facility])    UPPER ENDOSCOPY (EGD) Received 9/6/19 ([facility])    FLEX SIGMOIDOSCOPY  Received 11/3/16 ([facility])   ERCP N/A    IMAGING (DISC & REPORT)      CT  Received/ Care Everywhere [facility] ([facility]):  - CT Abdomen: 12/22/18  - CT Abdomen Pelvis: 1/12/17, 9/5/16, 7/19/16 (images archived into PACS 1/3)    [facility]:  - CT Abdomen Pelvis: 3/10/16, 1/30/16 (images archived into PACS on 1/3)    [facility]:  - CT Abdomen Pelvis: 6/29/19 (image archived into PACS 1/3)    *12/31 Fax request sent to [facility] ([phone]) and [facility] ([phone]) for images noted above.   -Bhao     *1/3 Fax request sent to [facility] for CT image 12/22/18. -Bhao    MRI Internal MR Pelvis: 2/25/16, 1/8/15   XRAY N/A    ULTRASOUND (ENDOANAL/ENDORECTAL) N/A      1/6/2020 Checked PACS and CT Abdomen 12/22/18 is in PACS. -Bhao

## 2020-01-02 ENCOUNTER — OFFICE VISIT (OUTPATIENT)
Dept: RHEUMATOLOGY | Facility: CLINIC | Age: 45
End: 2020-01-02
Payer: MEDICARE

## 2020-01-02 ENCOUNTER — TELEPHONE (OUTPATIENT)
Dept: RHEUMATOLOGY | Facility: CLINIC | Age: 45
End: 2020-01-02

## 2020-01-02 VITALS
WEIGHT: 315 LBS | OXYGEN SATURATION: 98 % | BODY MASS INDEX: 37.19 KG/M2 | HEART RATE: 87 BPM | HEIGHT: 77 IN | DIASTOLIC BLOOD PRESSURE: 88 MMHG | SYSTOLIC BLOOD PRESSURE: 133 MMHG

## 2020-01-02 DIAGNOSIS — M45.9 ANKYLOSING SPONDYLITIS, UNSPECIFIED SITE OF SPINE (H): Primary | ICD-10-CM

## 2020-01-02 DIAGNOSIS — Z79.899 HIGH RISK MEDICATIONS (NOT ANTICOAGULANTS) LONG-TERM USE: ICD-10-CM

## 2020-01-02 PROCEDURE — 99214 OFFICE O/P EST MOD 30 MIN: CPT | Performed by: INTERNAL MEDICINE

## 2020-01-02 ASSESSMENT — MIFFLIN-ST. JEOR: SCORE: 2448.89

## 2020-01-02 NOTE — PROGRESS NOTES
"Rheumatology Clinic Visit      Joel Pineda MRN# [id number]   YOB: 1975 Age: 44 year old      Date of visit: 1/02/20   PCP: Dr. Ksenia Lyles    Chief Complaint   Patient presents with:  Follow Up: reaction to humira    Assessment and Plan     1.  Ankylosing spondylitis: Many years of inflammatory back pain but no clear sacroiliitis seen on x-rays or MRIs; then had a lumbar spine x-ray on 2/23/2018 at [facility] showing \"Moderate L5-S1 and mild L4-5 degenerative disc disease and degenerative facet arthropathy. No evidence for fracture, subluxation, or spondylolisthesis. Chronic bridging enthesophyte across the lower right SI joint with irregularity of the joint space suspicious for sequelae of chronic inflammatory sacroiliitis. Correlate clinically.\"  This is complicated by a history of back surgery and degenerative changes.  HLA-B27 positive per record review but the actual lab report has not been seen.  He has a personal history of diverticulitis requiring sigmoidectomy.  Reportedly his mother has ulcerative colitis. Based on his symptoms, the x-ray results, and HLA-B27 positive, it is most likely ankylosing spondylitis and Remicade was started with improvement of lower back pain and resolution of lower back stiffness. However, Remicade was also associated with increased infections so it was changed to Simponi; Simponi was associated with hives, nausea, dizziness, and drowsiness.  Then on Humira that he initially thought was associated with increased fatigue but holding it did not affect the fatigue; holding it did result in worsening inflammatory axial symptoms.  Most recently with worsening tongue burning, nausea, and post-nasal drip that he correlates with the last 2 doses of Humira; these are unusual side effects but he correlates them with Humira; we discussed the option of premedicated with Benadryl and Tylenol and continuing Humira versus changing to an alternative medication because the nausea " has been a longstanding issue that he associates with Humira will change to Enbrel.  Because he has responded well to TNF inhibitors will continue with TNF inhibitors.  If needed change in the future then will change to Cosentyx.  - Discontinue Humira 40mg SQ every 14 days (receiving from the )   - Start Enbrel 50mg SQ every 7 days    # Etanercept (Enbrel) Risks and Benefits: The risks and benefits of etanercept were discussed in detail and the patient verbalized understanding.  The risks discussed include, but are not limited to, the risk for hypersensitivity, anaphylaxis, anaphylactoid reactions, an increased risk for serious infections leading to hospitalization or death, a possible increased risk for lymphoma and other malignancies, a possible worsening of demyelinating diseases, a possible worsening of heart failure, possible reactivation of hepatitis B, and possible reactivation of latent tuberculosis.  Subcutaneous injections may result in injection site reactions and/or pain at the site of injection.  It was discussed that the medication would need to be discontinued if a serious infection develops.  It was discussed that live vaccinations should not be received while using etanercept or within 30 days prior to starting etanercept.  I encouraged reviewing the package insert and asking any questions about the medication.      2. Mechanical back pain: now seeing NSGY.  Perioperative DMARD management: Humira should not be taken at least 2 weeks prior to surgery; and restarted no sooner than 2 weeks after the surgery is completed and only in the absence of infection, absence of delayed wound healing, and only after given approval to restart by his surgeon. I explained that he would also need to discuss with his surgeon in case there were any other preferences from the surgeon.     3. Obesity; reported hx of fatty liver disease: encouraged weight loss and discussed the health benefit    4.   Vaccinations: Vaccinations reviewed with Mr. Pineda.  Risks and benefits of vaccinations were discussed.  - Influenza: up to date  - [id number]: up to date  - [id number]: up to date  - Shingrix: up to date    Mr. Pineda verbalized agreement with and understanding of the rational for the diagnosis and treatment plan.  All questions were answered to best of my ability and the patient's satisfaction. Mr. Pineda was advised to contact the clinic with any questions that may arise after the clinic visit.      Thank you for involving me in the care of the patient    Return to clinic: 3 months      HPI   Joel Pineda is a 44 year old male with a past medical history significant for hypertension, hyperlipidemia, asthma, history of pulmonary embolism, history of diverticulitis requiring sigmoidectomy, GERD, obstructive sleep apnea, bipolar disorder, hypothyroidism, B12 deficiency, CKD? (reportedly issue with contrast) and chronic pain syndrome who presents for follow-up of ankylosing spondylitis.    Today, Mr. Pineda reports that he had tongue burning and post-nasal drip with Humira 2 doses ago, then much worse with the last does that he tx'd with an Epipen; no trouble breathing or chest pain.  Humira has helped the back pain.  Would like to change medications.     Denies fevers, chills, nausea, vomiting, constipation, diarrhea. No abdominal pain. No chest pain/pressure, palpitations, or shortness of breath. No LE swelling. No neck pain. No oral or nasal sores.  No rash. No sicca symptoms. No photosensitivity or photophobia. No eye pain or redness. No history of inflammatory eye disease.  No history of Raynaud's Phenomenon.  No seizure history.  No known renal disorder.      Mother: ulcerative colitis    Tobacco: None  EtOH: None  Drugs: None    ROS   GEN: No fevers, chills, night sweats, or weight change  SKIN: No itching, rashes, sores  HEENT: No oral or nasal ulcers.  CV: No chest pain, pressure, palpitations, or dyspnea on  exertion.  PULM: No SOB, wheeze, cough.  GI: No nausea, vomiting, constipation, diarrhea. No blood in stool. No abdominal pain.  : No blood in urine.  MSK: See HPI.  NEURO: See HPI  EXT: No LE swelling  PSYCH: See HPI    Active Problem List     Patient Active Problem List   Diagnosis     Major depressive disorder, recurrent episode (H)     Intermittent asthma     Hyperlipidemia LDL goal <100     Chronic nonallergic rhinitis     Diverticulosis     GERD (gastroesophageal reflux disease)     Anxiety     LLOYD (obstructive sleep apnea)- mild (AHI 11)     Intracranial arachnoid cyst     Facet arthritis of cervical region     Acquired hypothyroidism     Bipolar 2 disorder (H)     Chronic midline low back pain without sciatica     Irritable bowel syndrome with diarrhea     B12 deficiency     Essential hypertension with goal blood pressure less than 140/90     Chronic pain syndrome     Ankylosing spondylitis of sacral region (H)     Morbid obesity due to hypertriglyceridemia (H)     Fatty infiltration of liver     DDD (degenerative disc disease), lumbar     Type 2 diabetes mellitus with complication, without long-term current use of insulin (H)     Peripheral polyneuropathy     History of pulmonary embolism     Ingrown toenail     Lipoma of skin and subcutaneous tissue     Hypertriglyceridemia     Past Medical History     Past Medical History:   Diagnosis Date     Acne      Allergic state      Anxiety      Bipolar 2 disorder (H)      Chest pain     Chest pain, regulated w/BP meds. Clear arteries.     Chronic pain      Depressive disorder      Diabetes (H)      Diverticulosis      Gastroesophageal reflux disease      Hypertension      IBS (irritable bowel syndrome)      Intracranial arachnoid cyst      Polyneuropathy      Pulmonary embolism (H)      Skin exam, screening for cancer 12/3/2013     Sleep apnea      Uncomplicated asthma      Past Surgical History     Past Surgical History:   Procedure Laterality Date     BACK  SURGERY  10/07    lumbar discectomy L5-S1     COLONOSCOPY      Note: colonoscopy scheduled with [facility] on Friday, 9/4/15     COSMETIC SURGERY  2012    Nose Exterior - functional     GI SURGERY  August 2013    Sigmoidectomy     HERNIA REPAIR, UMBILICAL  8/23/11    small, Dr. Evan Beavers     INCISION AND DRAINAGE, ABSCESS, COMPLEX  8/23/11    umbilical, Dr. Evan Beavers     LAPAROSCOPIC ASSISTED COLECTOMY LEFT (DESCENDING)  8/15/2013    Procedure: LAPAROSCOPIC ASSISTED COLECTOMY LEFT (DESCENDING);  Laparoscopic Hand Assisted Sigmoid Resection, Mobilization of Splenic Fissure, coloproctoscopy, *Latex Free Room* Anesthesia General with Pain block  ;  Surgeon: Aurora Justice MD;  Location: [facility] OR     NERVE SURGERY  8/18/11    RF ablation @ L3-S1 @ [facility]     RECONSTRUCT NOSE AND SEPTUM (FUNCTIONAL)  10/14/2011    Procedure:RECONSTRUCT NOSE AND SEPTUM (FUNCTIONAL); Functional Septorhinoplasty, Turbinate Reduction, ; Surgeon:CEDRIC CUEVAS; Location:[facility] OR     SINUS SURGERY  10/1/01    ethmoidectomy chronic sinusitis     Allergy     Allergies   Allergen Reactions     Amoxicillin-Pot Clavulanate Difficulty breathing     Banana Shortness Of Breath     Pt reports organic Banana is okay.      Nitroglycerin Palpitations     Penicillins Anaphylaxis     Provigil [Modafinil] Shortness Of Breath     headache     Gadolinium Hives and Itching     Patient was premedicated for the contrast allergy. He did still have a reaction a few hours after injection. Hives and itching. Dr. Gomez told tech to inform pt he should only have contrast again in the future when premedicated and at a hospital. Not at an outpatient facility.      Ketoconazole      Topical cream caused swelling and itching     Dye [Contrast Dye] Other (See Comments) and Hives     Moderate flushing, CT contrast     Golimumab      Hives, bradycardia, face swelling     Neurontin [Gabapentin] Hives     Moderate hives     Nortriptyline Hives     Varicella Virus Vaccine  Live      Rash     Flagyl [Metronidazole Hcl] Palpitations and Hives     Latex Rash     Metronidazole Palpitations, Other (See Comments) and Rash     dizziness (versus ciprofloxacin taken at same time)     No Clinical Screening - See Comments Rash     Nitrile gloves     Current Medication List     Current Outpatient Medications   Medication Sig     acetaminophen 500 MG CAPS Take 500 mg by mouth every 4 hours as needed     albuterol (PROAIR HFA/PROVENTIL HFA/VENTOLIN HFA) 108 (90 Base) MCG/ACT inhaler Inhale 2 puffs into the lungs every 4 hours as needed for shortness of breath / dyspnea or wheezing     ALPRAZolam (XANAX XR) 0.5 MG 24 hr tablet Take 0.5 mg by mouth every morning     aspirin (ASA) 81 MG tablet Take 81 mg by mouth daily      celecoxib (CELEBREX) 200 MG capsule TAKE 1 CAPSULE 2 TIMES     DAILY AS NEEDED MODERATE   PAIN     cetirizine (ZYRTEC) 10 MG tablet Take 1 tablet (10 mg) by mouth At Bedtime     cholecalciferol (VITAMIN D3) 86710 units (1250 mcg) capsule Take one capsule every two weeks.     cyanocobalamin (CYANOCOBALAMIN) 1000 MCG/ML injection Inject 1 mL (1,000 mcg) into the muscle every 30 days     DULoxetine (CYMBALTA) 60 MG capsule Take 60 mg by mouth daily     EPINEPHrine (EPIPEN/ADRENACLICK/OR ANY BX GENERIC EQUIV) 0.3 MG/0.3ML injection 2-pack Inject 0.3 mLs (0.3 mg) into the muscle once as needed for anaphylaxis     famotidine (PEPCID) 10 MG tablet Take 10 mg by mouth Prior to administration of Humira every 2 weeks     fluticasone-salmeterol (ADVAIR DISKUS) 250-50 MCG/DOSE inhaler Inhale 1 puff into the lungs 2 times daily     Ginger, Zingiber officinalis, (GINGER ROOT) 550 MG CAPS capsule Take 550 mg by mouth Up to three times daily     hydrOXYzine (ATARAX) 50 MG tablet Take 50 mg by mouth 2 times daily For anxiety and insomnia     lamoTRIgine (LAMICTAL) 100 MG tablet Take 200 mg by mouth daily     levothyroxine (SYNTHROID/LEVOTHROID) 75 MCG tablet TAKE 1 TABLET EVERY MORNING      Liniments (SALONPAS EX) Externally apply 1 Application topically daily as needed     loperamide (IMODIUM) 2 MG capsule Take 2 mg by mouth 4 times daily as needed for diarrhea     melatonin 3 MG tablet Take 1 mg by mouth At Bedtime     metFORMIN (GLUCOPHAGE-XR) 500 MG 24 hr tablet Take 2 tablets (1,000 mg) by mouth daily (with dinner)     metoclopramide (REGLAN) 5 MG tablet Take 1 tablet (5 mg) by mouth 3 times daily as needed (nausea)     metoprolol succinate ER (TOPROL-XL) 200 MG 24 hr tablet Take 1 tablet (200 mg) by mouth 2 times daily     montelukast (SINGULAIR) 10 MG tablet Take 1 tablet (10 mg) by mouth every evening     omega-3 acid ethyl esters (LOVAZA) 1 g capsule TAKE 2 CAPSULES BY MOUTH TWICE DAILY.     omeprazole 20 MG tablet Take 20 mg by mouth daily      ondansetron (ZOFRAN) 8 MG tablet TAKE 1 TABLET BY MOUTH EVERY 8 HOURS AS NEEDED FOR NAUSEA OR VOMITING     order for DME Equipment being ordered: lumbosacral belt/brace     order for DME Respironics REMSTAR 60 Series Auto CPAP 9-13 cm H2O, Wisp nasal mask w/a large cushion and a chinstrap     oxyCODONE (ROXICODONE) 5 MG tablet Take 1-2 tablets (5-10 mg) by mouth every 6 hours as needed for pain (maximum 4 tablet(s) per day)     pregabalin (LYRICA) 150 MG capsule Take 1 capsule (150 mg) by mouth 2 times daily     pseudoePHEDrine (SUDAFED) 120 MG 12 hr tablet Take 1 tablet (120 mg) by mouth every 12 hours     QUEtiapine (SEROQUEL) 25 MG tablet Take 25 mg by mouth 4 times daily as needed     ramipril (ALTACE) 10 MG capsule Take 1 capsule (10 mg) by mouth daily     rizatriptan (MAXALT-MLT) 5 MG ODT Take 1 tablet (5 mg) by mouth at onset of headache for migraine     rosuvastatin (CRESTOR) 40 MG tablet TAKE 1 TABLET DAILY     sulfamethoxazole-trimethoprim (BACTRIM DS/SEPTRA DS) 800-160 MG tablet Take 1 tablet by mouth 2 times daily     syringe, disposable, (BD TUBERCULIN SYRINGE) 1 ML MISC Equipment being ordered: 1 ml tuberculin syringes to be used for  "Vitamin B12 injections.     vitamin C (ASCORBIC ACID) 500 MG tablet Take 500 mg by mouth daily     adalimumab (HUMIRA *CF*) 40 MG/0.4ML pen kit Inject 0.4 mLs (40 mg) Subcutaneous every 14 days . Hold for signs of infection, then seek medical attention. (Patient not taking: Reported on 12/27/2019)     No current facility-administered medications for this visit.          Social History   See HPI    Family History     Family History   Problem Relation Age of Onset     Musculoskeletal Disorder Mother         back     Anxiety Disorder Mother      Colon Polyps Mother      Ulcerative Colitis Mother         and ischemic small intestine, surgery     Hypertension Mother      Breast Cancer Mother      Osteoporosis Mother      Diabetes Mother         Type 2, Diagnosed in 2014     Depression Mother         Takes Cymbalta to help with chronic pain + depx     Thyroid Disease Mother         Hypothyroidism     Obesity Mother         Under much better control latter half of 2015     Musculoskeletal Disorder Father         back     Substance Abuse Father      Hypertension Father      Hyperlipidemia Father      Depression Father         Off meds for many years. Seems \"ok\"     Heart Disease Maternal Grandmother      Heart Disease Maternal Grandfather      Psychotic Disorder Paternal Grandfather      Suicide Paternal Grandfather      Depression Paternal Grandfather         Pediatrician. Committed suicide by pistol in 1990.     Musculoskeletal Disorder Brother         back     Depression Brother         Expressed as anger and moodiness     Substance Abuse Sister      Depression Sister         Mental Health Therapist, yet no anti-depressants?     Anxiety Disorder Sister         Mental Health Therapist, yet no anti-anxiety meds?     Other Cancer Other         Bladder Cancer - Fatal     Substance Abuse Brother      Colon Cancer No family hx of      Crohn's Disease No family hx of      Anesthesia Reaction No family hx of      Cancer No " "family hx of         No family history of skin cancer     Physical Exam     Temp Readings from Last 3 Encounters:   12/27/19 98.5  F (36.9  C) (Oral)   12/16/19 98.6  F (37  C) (Oral)   12/02/19 98.4  F (36.9  C) (Oral)     BP Readings from Last 5 Encounters:   01/02/20 133/88   12/27/19 126/86   12/16/19 126/88   12/12/19 125/87   12/02/19 130/82     Pulse Readings from Last 1 Encounters:   01/02/20 87     Resp Readings from Last 1 Encounters:   12/27/19 19     Estimated body mass index is 37.95 kg/m  as calculated from the following:    Height as of this encounter: 1.956 m (6' 5\").    Weight as of this encounter: 145.2 kg (320 lb).    GEN: NAD   HEENT: MMM. No oral lesions. Anicteric, noninjected sclera  CV: S1, S2. RRR. No m/r/g.  PULM: CTA bilaterally. No w/c.  MSK: MCPs, PIPs, DIPs, wrists, elbows, shoulders, knees, ankles, and MTPs without swelling or tenderness to palpation.  Hips nontender to direct palpation.  No dactylitis.  Achilles tendons nontender to palpation.    NEURO: UE and LE strengths 5/5 and equal bilaterally.   SKIN: No rash.  No nail pitting.  EXT: No LE edema  PSYCH: Alert. Appropriate.     Labs / Imaging (select studies)   RF/CCP  Recent Labs   Lab Test 08/07/15  0846 09/03/14  1232   RHF <20 <20     CBC  Recent Labs   Lab Test 12/27/19  1209 09/26/19  1010 08/08/19  1658   WBC 8.0 7.7 13.6*   RBC 5.20 5.15 5.72   HGB 14.9 15.2 16.3   HCT 45.0 46.2 50.0   MCV 87 90 87   RDW 14.4 13.8 13.2    262 316   MCH 28.7 29.5 28.5   MCHC 33.1 32.9 32.6   NEUTROPHIL 45.0 39.7 54.0   LYMPH 38.1 44.9 34.8   MONOCYTE 13.7 12.2 8.6   EOSINOPHIL 2.4 2.3 1.3   BASOPHIL 0.8 0.9 0.9   ANEU 3.6 3.1 7.3   ALYM 3.0 3.5 4.7   KATIE 1.1 0.9 1.2   AEOS 0.2 0.2 0.2   ABAS 0.1 0.1 0.1     CMP  Recent Labs   Lab Test 09/26/19  1010 08/08/19  1658 07/21/19 1954 07/01/19  1314   NA  --  138 142 138   POTASSIUM  --  4.5 4.3 4.3   CHLORIDE  --  106 109 106   CO2  --  22 28 25   ANIONGAP  --  10 5 7   GLC  --  95 100* " 104*   BUN  --  13 9 11   CR 0.83 0.88 0.84 0.90   GFRESTIMATED >90 >90 >90 >90   GFRESTBLACK >90 >90 >90 >90   ALYCE  --  9.8 9.1 9.7   BILITOTAL 0.4  --  0.6 0.5   ALBUMIN 4.3  --  4.2 4.4   PROTTOTAL 7.7  --  7.5 7.8   ALKPHOS 107  --  102 100   AST 48*  --  45 41   ALT 53  --  49 44     Calcium/VitaminD  Recent Labs   Lab Test 09/13/19  0943 08/08/19  1658 07/21/19  1954 07/01/19  1314  01/03/19  0844  05/03/17  1456   ALYCE  --  9.8 9.1 9.7   < > 9.6   < > 9.5   VITDT 40  --   --   --   --  38  --  30    < > = values in this interval not displayed.     ESR/CRP  Recent Labs   Lab Test 09/26/19  1010 04/01/19  1540 02/13/16  1510   SED 4 4 6   CRP <2.9 <2.9 3.4     Hepatitis B  Recent Labs   Lab Test 02/28/18  1157 01/06/15  1028   HBCAB Nonreactive  --    HEPBANG Nonreactive Nonreactive     Hepatitis C  Recent Labs   Lab Test 02/28/18  1157 01/06/15  1028   HCVAB Nonreactive Nonreactive   Assay performance characteristics have not been established for newborns,   infants, and children       Lyme ab screening  Recent Labs   Lab Test 02/22/19  1457   LYMEGM 0.02     Tuberculosis Screening  Recent Labs   Lab Test 02/28/18  1157 01/06/15  1028   TBRSLT Negative Negative   TBAGN 0.00 0.00     Immunization History     Immunization History   Administered Date(s) Administered     FLU 6-35 months 01/03/2019     Flu, Unspecified 08/01/2012, 09/12/2019     Influenza (H1N1) 02/04/2010     Influenza (IIV3) PF 11/11/1999, 10/28/2003, 10/15/2008, 08/21/2012, 08/02/2013, 09/09/2013     Influenza Vaccine IM > 6 months Valent IIV4 10/02/2015, 10/10/2016, 09/27/2017, 09/07/2018, 01/03/2019, 09/12/2019     Pneumo Conj 13-V (2010&after) 10/02/2015     Pneumococcal 23 valent 05/15/2009, 10/10/2016     TD (ADULT, 7+) 10/04/2002     TDAP Vaccine (Adacel) 07/16/2010     Td (Adult), Adsorbed 10/13/1997, 10/04/2002     Zoster vaccine recombinant adjuvanted (SHINGRIX) 05/29/2019, 09/12/2019          Chart documentation done in part with [employer]  Voice recognition Software. Although reviewed after completion, some word and grammatical error may remain.    Oneil Baxter MD

## 2020-01-02 NOTE — TELEPHONE ENCOUNTER
PA Initiation    Medication: enbrel pa pending   Insurance Company: [employer] - Phone [phone] Fax [phone]  Pharmacy Filling the Rx:    Filling Pharmacy Phone:    Filling Pharmacy Fax:    Start Date: 1/2/2020

## 2020-01-02 NOTE — NURSING NOTE
Prior to immunization administration, verified patients identity using patient s name and date of birth. Please see Immunization Activity for additional information.     Screening Questionnaire for Adult Immunization    Are you sick today?   No   Do you have allergies to medications, food, a vaccine component or latex?   No   Have you ever had a serious reaction after receiving a vaccination?   No   Do you have a long-term health problem with heart, lung, kidney, or metabolic disease (e.g., diabetes), asthma, a blood disorder, no spleen, complement component deficiency, a cochlear implant, or a spinal fluid leak?  Are you on long-term aspirin therapy?   No   Do you have cancer, leukemia, HIV/AIDS, or any other immune system problem?   No   Do you have a parent, brother, or sister with an immune system problem?   No   In the past 3 months, have you taken medications that affect  your immune system, such as prednisone, other steroids, or anticancer drugs; drugs for the treatment of rheumatoid arthritis, Crohn s disease, or psoriasis; or have you had radiation treatments?   No   Have you had a seizure, or a brain or other nervous system problem?   No   During the past year, have you received a transfusion of blood or blood    products, or been given immune (gamma) globulin or antiviral drug?   No   For women: Are you pregnant or is there a chance you could become       pregnant during the next month?   No   Have you received any vaccinations in the past 4 weeks?   No     Immunization questionnaire answers were all negative.        Per orders of Dr. Baxter, injection of PCV13  given by Rocael Olivera MA. Patient instructed to remain in clinic for 15 minutes afterwards, and to report any adverse reaction to me immediately.       Screening performed by Rocael Olivera MA on 1/2/2020 at 12:11 PM.

## 2020-01-02 NOTE — NURSING NOTE
RAPID3 (0-30) Cumulative Score  13          RAPID3 Weighted Score (divide #4 by 3 and that is the weighted score)  4.33

## 2020-01-02 NOTE — PATIENT INSTRUCTIONS
Rheumatology    [facility] in [place]  (Monday)  [address]  [place] [address]       [facility] in [place]  (Tuesday)  [address] [place] [address]    [facility] in [place]  (Wed., Thurs., and Friday)  [address] [place] [address]    Phone number: [phone]

## 2020-01-03 NOTE — TELEPHONE ENCOUNTER
Prior Authorization Approval    Authorization Effective Date: 1/2/2020  Authorization Expiration Date:    Medication: enbrel pa approved  Approved Dose/Quantity: 2/28ds  Reference #: [id number]   Insurance Company: [employer] - Phone [phone] Fax [phone]  Expected CoPay: na     CoPay Card Available: No    Foundation Assistance Needed: yes  Which Pharmacy is filling the prescription (Not needed for infusion/clinic administered):   Pharmacy Notified: Yes  Patient Notified: Yes

## 2020-01-06 ASSESSMENT — ENCOUNTER SYMPTOMS
DECREASED CONCENTRATION: 1
SPEECH CHANGE: 0
WEAKNESS: 0
NAUSEA: 1
EYE WATERING: 0
SNORES LOUDLY: 0
CHILLS: 0
EYE IRRITATION: 1
VOMITING: 0
DISTURBANCES IN COORDINATION: 0
CONSTIPATION: 0
NUMBNESS: 1
HOARSE VOICE: 0
NECK PAIN: 1
ARTHRALGIAS: 1
TREMORS: 0
WHEEZING: 0
SHORTNESS OF BREATH: 1
DYSPNEA ON EXERTION: 1
DECREASED APPETITE: 0
NERVOUS/ANXIOUS: 1
BOWEL INCONTINENCE: 0
BLOOD IN STOOL: 0
HEADACHES: 1
MEMORY LOSS: 1
HEMOPTYSIS: 0
WEIGHT GAIN: 0
PANIC: 0
EYE REDNESS: 1
COUGH: 0
RECTAL PAIN: 1
EYE PAIN: 0
SKIN CHANGES: 1
SINUS CONGESTION: 1
COUGH DISTURBING SLEEP: 1
TROUBLE SWALLOWING: 0
DIARRHEA: 1
SINUS PAIN: 0
LOSS OF CONSCIOUSNESS: 0
NAIL CHANGES: 1
POSTURAL DYSPNEA: 1
PARALYSIS: 0
HALLUCINATIONS: 0
JOINT SWELLING: 0
POLYPHAGIA: 0
JAUNDICE: 0
INCREASED ENERGY: 0
SORE THROAT: 0
ALTERED TEMPERATURE REGULATION: 1
POOR WOUND HEALING: 0
SEIZURES: 0
WEIGHT LOSS: 0
HEARTBURN: 0
MYALGIAS: 1
TASTE DISTURBANCE: 0
STIFFNESS: 1
FEVER: 0
MUSCLE CRAMPS: 0
INSOMNIA: 1
DIZZINESS: 1
DOUBLE VISION: 0
BLOATING: 0
SPUTUM PRODUCTION: 0
TINGLING: 1
NECK MASS: 0
POLYDIPSIA: 0
DEPRESSION: 1
NIGHT SWEATS: 0
ABDOMINAL PAIN: 1
MUSCLE WEAKNESS: 0
FATIGUE: 1
BACK PAIN: 1
SMELL DISTURBANCE: 0

## 2020-01-06 NOTE — TELEPHONE ENCOUNTER
Free Drug Application Initiated  Medication-enbrel  Sponsor-[employer]  Phone # [phone]  Fax # [phone]  Additional Information-james sent to pt and provider 1/6

## 2020-01-08 ENCOUNTER — MYC MEDICAL ADVICE (OUTPATIENT)
Dept: PODIATRY | Facility: CLINIC | Age: 45
End: 2020-01-08

## 2020-01-08 ENCOUNTER — MYC REFILL (OUTPATIENT)
Dept: FAMILY MEDICINE | Facility: CLINIC | Age: 45
End: 2020-01-08

## 2020-01-08 DIAGNOSIS — M45.8 ANKYLOSING SPONDYLITIS OF SACRAL REGION (H): ICD-10-CM

## 2020-01-08 DIAGNOSIS — M51.369 DDD (DEGENERATIVE DISC DISEASE), LUMBAR: ICD-10-CM

## 2020-01-08 NOTE — TELEPHONE ENCOUNTER
Requested Prescriptions   Pending Prescriptions Disp Refills     oxyCODONE (ROXICODONE) 5 MG tablet 35 tablet 0     Sig: Take 1-2 tablets (5-10 mg) by mouth every 6 hours as needed for pain (maximum 4 tablet(s) per day)       There is no refill protocol information for this order          oxyCODONE (ROXICODONE) 5 MG tablet      Last Written Prescription Date:  12/16/19  Last Fill Quantity: 35,   # refills: 0  Last Office Visit: 12/27/19  Future Office visit:    Next 5 appointments (look out 90 days)    Apr 01, 2020  1:00 PM CDT  Return Visit with Oneil Baxter MD  [facility] ([facility] [address]  [place] [address] [phone]           Routing refill request to provider for review/approval because:  Drug not on the [facility], [facility] or [facility] refill protocol or controlled substance

## 2020-01-09 RX ORDER — OXYCODONE HYDROCHLORIDE 5 MG/1
5-10 TABLET ORAL EVERY 6 HOURS PRN
Qty: 35 TABLET | Refills: 0 | Status: SHIPPED | OUTPATIENT
Start: 2020-01-09 | End: 2020-01-25

## 2020-01-09 NOTE — TELEPHONE ENCOUNTER
Controlled Substance Refill Request for Oxycodone  Problem List Complete:  Yes  Chronic pain syndrome (Chronic)   Problem Detail     Noted:  4/4/2017   Priority:  Medium   Overview Addendum 11/5/2019  3:04 PM by Sapna Barragan RN   Patient is followed by Ksenia Lyles MD for ongoing prescription of pain medication.  All refills should only be approved by this provider, or covering partner.     Medication(s): oxy 5.   Maximum quantity per month: 40  Clinic visit frequency required: Q3  months      Controlled substance agreement: 6/12/19  Encounter-Level CSA - 04/04/2017:            Controlled Substance Agreement - Scan on 4/11/2017  1:30 PM : CONTROLLED SUBSTANCE AGREEMENT (below)                   Pain Clinic evaluation in the past: Yes     DIRE Total Score(s):  No flowsheet data found.     Last [facility] website verification:  11/5/19   [email or web address]       checked in past 3 months?  Yes 11/5/19   Last Written Prescription Date:  12/16/19  Last Fill Quantity: 35 tablets  # refills: 0   Last office visit: 12/27/2019 with prescribing provider:  12/27/19   Future Office Visit:   Next 5 appointments (look out 90 days)    Apr 01, 2020  1:00 PM CDT  Return Visit with Oneil Baxter MD  [facility] ([facility] [address] [address]  [phone]         RX monitoring [place] reviewed:  not reviewed/not due - last done on 11/5/19    [place] profile:  [email or web address]        Martell Chaves RN, BSN, PHN

## 2020-01-10 ENCOUNTER — PRE VISIT (OUTPATIENT)
Dept: SURGERY | Facility: CLINIC | Age: 45
End: 2020-01-10

## 2020-01-10 ENCOUNTER — OFFICE VISIT (OUTPATIENT)
Dept: SURGERY | Facility: CLINIC | Age: 45
End: 2020-01-10
Payer: MEDICARE

## 2020-01-10 VITALS
TEMPERATURE: 97.7 F | DIASTOLIC BLOOD PRESSURE: 90 MMHG | HEART RATE: 91 BPM | BODY MASS INDEX: 37.19 KG/M2 | HEIGHT: 77 IN | SYSTOLIC BLOOD PRESSURE: 131 MMHG | OXYGEN SATURATION: 98 % | WEIGHT: 315 LBS

## 2020-01-10 DIAGNOSIS — L02.215 PERINEAL ABSCESS, SUPERFICIAL: ICD-10-CM

## 2020-01-10 DIAGNOSIS — L29.0 PRURITUS ANI: Primary | ICD-10-CM

## 2020-01-10 ASSESSMENT — MIFFLIN-ST. JEOR: SCORE: 2465.67

## 2020-01-10 ASSESSMENT — PAIN SCALES - GENERAL: PAINLEVEL: MILD PAIN (3)

## 2020-01-10 NOTE — LETTER
1/10/2020       RE: Joel Pineda  [address] [place] [address]     Dear Colleague,    Thank you for referring your patient, Joel Pineda, to the [facility] COLON AND RECTAL SURGERY at [facility]. Please see a copy of my visit note below.    Colon and Rectal Surgery Clinic Note    RE: Joel Pineda.  : 1975.  MARY: 1/10/2020.    Reason for visit: Perineal abscess.    HPI: 44M with history of diverticular disease s/p sigmoidectomy with Dr. Justice presents [facility] recurrent perineal abscess. First occurred about 2 years ago and was drained at the time. Healed without issue and did not cause problems until about 2.5 weeks ago when he developed pain in the area. He applied nifedipine cream that was prescribed by [facility]I several years ago and felt the area swell again. He went to a local ED where it is I&D'ed with drainage of mostly blood. This has now healed again and has not had any pain or drainage for over a week. He comes today for evaluation of underlying cyst or fistula as a cause for recurrent infection.    Denies change in bowel habits. No pain or bleeding with BMs. No abdominal pain. He has been using nifedipine and preparation H for perianal irritation.    Medical history:  Past Medical History:   Diagnosis Date     Acne      Allergic state      Anxiety      Bipolar 2 disorder (H)      Chest pain     Chest pain, regulated w/BP meds. Clear arteries.     Chronic pain      Depressive disorder      Diabetes (H)      Diverticulosis      Gastroesophageal reflux disease      Hypertension      IBS (irritable bowel syndrome)      Intracranial arachnoid cyst      Polyneuropathy      Pulmonary embolism (H)      Skin exam, screening for cancer 12/3/2013     Sleep apnea      Uncomplicated asthma        Surgical history:  Past Surgical History:   Procedure Laterality Date     BACK SURGERY  10/07    lumbar discectomy L5-S1     COLONOSCOPY      Note: colonoscopy scheduled  "with [facility] on Friday, 9/4/15     COSMETIC SURGERY  2012    Nose Exterior - functional     GI SURGERY  August 2013    Sigmoidectomy     HERNIA REPAIR, UMBILICAL  8/23/11    henok, Dr. Evan Beavers     INCISION AND DRAINAGE, ABSCESS, COMPLEX  8/23/11    umbilical, Dr. Evan Beavers     LAPAROSCOPIC ASSISTED COLECTOMY LEFT (DESCENDING)  8/15/2013    Procedure: LAPAROSCOPIC ASSISTED COLECTOMY LEFT (DESCENDING);  Laparoscopic Hand Assisted Sigmoid Resection, Mobilization of Splenic Fissure, coloproctoscopy, *Latex Free Room* Anesthesia General with Pain block  ;  Surgeon: Aurora Justice MD;  Location: [facility] OR     NERVE SURGERY  8/18/11    RF ablation @ L3-S1 @ [facility]     RECONSTRUCT NOSE AND SEPTUM (FUNCTIONAL)  10/14/2011    Procedure:RECONSTRUCT NOSE AND SEPTUM (FUNCTIONAL); Functional Septorhinoplasty, Turbinate Reduction, ; Surgeon:CEDRIC CUEVAS; Location:[facility] OR     SINUS SURGERY  10/1/01    ethmoidectomy chronic sinusitis       Family history:  Family History   Problem Relation Age of Onset     Musculoskeletal Disorder Mother         back     Anxiety Disorder Mother      Colon Polyps Mother      Ulcerative Colitis Mother         and ischemic small intestine, surgery     Hypertension Mother      Breast Cancer Mother      Osteoporosis Mother      Diabetes Mother         Type 2, Diagnosed in 2014     Depression Mother         Takes Cymbalta to help with chronic pain + depx     Thyroid Disease Mother         Hypothyroidism     Obesity Mother         Under much better control latter half of 2015     Musculoskeletal Disorder Father         back     Substance Abuse Father      Hypertension Father      Hyperlipidemia Father      Depression Father         Off meds for many years. Seems \"ok\"     Heart Disease Maternal Grandmother      Heart Disease Maternal Grandfather      Psychotic Disorder Paternal Grandfather      Suicide Paternal Grandfather      Depression Paternal Grandfather         Pediatrician. Committed suicide by " reji in 1990.     Musculoskeletal Disorder Brother         back     Depression Brother         Expressed as anger and moodiness     Substance Abuse Sister      Depression Sister         Mental Health Therapist, yet no anti-depressants?     Anxiety Disorder Sister         Mental Health Therapist, yet no anti-anxiety meds?     Other Cancer Other         Bladder Cancer - Fatal     Substance Abuse Brother      Colon Cancer No family hx of      Crohn's Disease No family hx of      Anesthesia Reaction No family hx of      Cancer No family hx of         No family history of skin cancer       Medications:  Current Outpatient Medications   Medication Sig Dispense Refill     acetaminophen 500 MG CAPS Take 500 mg by mouth every 4 hours as needed 60 capsule      albuterol (PROAIR HFA/PROVENTIL HFA/VENTOLIN HFA) 108 (90 Base) MCG/ACT inhaler Inhale 2 puffs into the lungs every 4 hours as needed for shortness of breath / dyspnea or wheezing 8.5 g 11     ALPRAZolam (XANAX XR) 0.5 MG 24 hr tablet Take 0.5 mg by mouth every morning       aspirin (ASA) 81 MG tablet Take 81 mg by mouth daily        celecoxib (CELEBREX) 200 MG capsule TAKE 1 CAPSULE 2 TIMES     DAILY AS NEEDED MODERATE   PAIN 180 capsule 1     cetirizine (ZYRTEC) 10 MG tablet Take 1 tablet (10 mg) by mouth At Bedtime 30 tablet 11     cholecalciferol (VITAMIN D3) 67509 units (1250 mcg) capsule Take one capsule every two weeks. 24 capsule 1     cyanocobalamin (CYANOCOBALAMIN) 1000 MCG/ML injection Inject 1 mL (1,000 mcg) into the muscle every 30 days 10 mL 0     DULoxetine (CYMBALTA) 60 MG capsule Take 60 mg by mouth daily       EPINEPHrine (EPIPEN/ADRENACLICK/OR ANY BX GENERIC EQUIV) 0.3 MG/0.3ML injection 2-pack Inject 0.3 mLs (0.3 mg) into the muscle once as needed for anaphylaxis 0.6 mL 3     etanercept (ENBREL SURECLICK) 50 MG/ML autoinjector Inject 50 mg Subcutaneous once a week Hold for signs of infection, and seek medical attention. 4 mL 5     famotidine  (PEPCID) 10 MG tablet Take 10 mg by mouth Prior to administration of Humira every 2 weeks       fluticasone-salmeterol (ADVAIR DISKUS) 250-50 MCG/DOSE inhaler Inhale 1 puff into the lungs 2 times daily 1 Inhaler 11     Ginger, Zingiber officinalis, (GINGER ROOT) 550 MG CAPS capsule Take 550 mg by mouth Up to three times daily       hydrOXYzine (ATARAX) 50 MG tablet Take 50 mg by mouth 2 times daily For anxiety and insomnia       lamoTRIgine (LAMICTAL) 100 MG tablet Take 200 mg by mouth daily       levothyroxine (SYNTHROID/LEVOTHROID) 75 MCG tablet TAKE 1 TABLET EVERY MORNING 90 tablet 1     Liniments (SALONPAS EX) Externally apply 1 Application topically daily as needed       loperamide (IMODIUM) 2 MG capsule Take 2 mg by mouth 4 times daily as needed for diarrhea       melatonin 3 MG tablet Take 1 mg by mouth At Bedtime       metFORMIN (GLUCOPHAGE-XR) 500 MG 24 hr tablet Take 2 tablets (1,000 mg) by mouth daily (with dinner) 180 tablet 3     metoclopramide (REGLAN) 5 MG tablet Take 1 tablet (5 mg) by mouth 3 times daily as needed (nausea) 20 tablet 3     metoprolol succinate ER (TOPROL-XL) 200 MG 24 hr tablet Take 1 tablet (200 mg) by mouth 2 times daily 180 tablet 1     montelukast (SINGULAIR) 10 MG tablet Take 1 tablet (10 mg) by mouth every evening 90 tablet 1     nifedipine 0.2% in white petrolatum 0.2 % OINT ointment Apply topically 2 times daily       omega-3 acid ethyl esters (LOVAZA) 1 g capsule TAKE 2 CAPSULES BY MOUTH TWICE DAILY. 360 capsule 3     omeprazole 20 MG tablet Take 20 mg by mouth daily        ondansetron (ZOFRAN) 8 MG tablet TAKE 1 TABLET BY MOUTH EVERY 8 HOURS AS NEEDED FOR NAUSEA OR VOMITING 20 tablet 4     order for DME Equipment being ordered: lumbosacral belt/brace 1 Units 0     order for DME Respironics REMSTAR 60 Series Auto CPAP 9-13 cm H2O, Wisp nasal mask w/a large cushion and a chinstrap       oxyCODONE (ROXICODONE) 5 MG tablet Take 1-2 tablets (5-10 mg) by mouth every 6 hours as  needed for pain (maximum 4 tablet(s) per day) 35 tablet 0     pregabalin (LYRICA) 150 MG capsule Take 1 capsule (150 mg) by mouth 2 times daily 60 capsule 5     pseudoePHEDrine (SUDAFED) 120 MG 12 hr tablet Take 1 tablet (120 mg) by mouth every 12 hours 28 tablet 0     QUEtiapine (SEROQUEL) 25 MG tablet Take 25 mg by mouth 4 times daily as needed       ramipril (ALTACE) 10 MG capsule Take 1 capsule (10 mg) by mouth daily 90 capsule 1     rizatriptan (MAXALT-MLT) 5 MG ODT Take 1 tablet (5 mg) by mouth at onset of headache for migraine 30 tablet 5     rosuvastatin (CRESTOR) 40 MG tablet TAKE 1 TABLET DAILY 90 tablet 1     sulfamethoxazole-trimethoprim (BACTRIM DS/SEPTRA DS) 800-160 MG tablet Take 1 tablet by mouth 2 times daily       syringe, disposable, (BD TUBERCULIN SYRINGE) 1 ML MISC Equipment being ordered: 1 ml tuberculin syringes to be used for Vitamin B12 injections. 12 each 1     vitamin C (ASCORBIC ACID) 500 MG tablet Take 500 mg by mouth daily         Allergies:  Allergies   Allergen Reactions     Amoxicillin-Pot Clavulanate Difficulty breathing     Banana Shortness Of Breath     Pt reports organic Banana is okay.      Nitroglycerin Palpitations     Penicillins Anaphylaxis     Provigil [Modafinil] Shortness Of Breath     headache     Gadolinium Hives and Itching     Patient was premedicated for the contrast allergy. He did still have a reaction a few hours after injection. Hives and itching. Dr. Gomez told tech to inform pt he should only have contrast again in the future when premedicated and at a hospital. Not at an outpatient facility.      Ketoconazole      Topical cream caused swelling and itching     Dye [Contrast Dye] Other (See Comments) and Hives     Moderate flushing, CT contrast     Golimumab      Hives, bradycardia, face swelling     Neurontin [Gabapentin] Hives     Moderate hives     Nortriptyline Hives     Varicella Virus Vaccine Live      Rash     Flagyl [Metronidazole Hcl]  "Palpitations and Hives     Latex Rash     Metronidazole Palpitations, Other (See Comments) and Rash     dizziness (versus ciprofloxacin taken at same time)     No Clinical Screening - See Comments Rash     Nitrile gloves       Social history:   Social History     Tobacco Use     Smoking status: Never Smoker     Smokeless tobacco: Never Used   Substance Use Topics     Alcohol use: Yes     Alcohol/week: 0.0 standard drinks     Drinks per session: 1 or 2     Binge frequency: Weekly     Comment: occ 1/week     Marital status: single.    ROS:  A complete review of systems was performed with the patient and all systems negative except as per HPI.    Physical Examination:  Exam was chaperoned by Pablo Pineda LPN  BP (!) 131/90 (BP Location: Left arm, Patient Position: Sitting, Cuff Size: Adult Large)   Pulse 91   Temp 97.7  F (36.5  C) (Oral)   Ht 1.956 m (6' 5\")   Wt 146.8 kg (323 lb 11.2 oz)   SpO2 98%   BMI 38.39 kg/m     General: Well hydrated. No acute distress.  Abdomen: Soft, NT.  Perianal external examination:  Perianal skin: intact with mild pruritis  Lesions: I&D site anterior perineum well healed. ~10cm from anus. Soft, no fluctuance.  Eversion of buttocks: There was not evidence of an anal fissure. Details: N/A.  Skin tags or external hemorrhoids: None.  Digital rectal examination: Was performed.   Sphincter tone: Good.  Palpable lesions: No.    Anoscopy: Was performed.   Hemorrhoids: No significant internal hemorrhoids.  Lesions: No    Laboratory values reviewed:  Recent Labs   Lab Test 12/27/19  1209 09/26/19  1010  03/18/15  0912   WBC 8.0 7.7   < >  --    HGB 14.9 15.2   < >  --     262   < >  --    CR  --  0.83   < >  --    ALBUMIN  --  4.3   < >  --    BILITOTAL  --  0.4   < >  --    ALKPHOS  --  107   < >  --    ALT  --  53   < >  --    AST  --  48*   < >  --    INR  --   --   --  0.90    < > = values in this interval not displayed.     ASSESSMENT  44M with recurrent perineal abscess, now " healed. No active infection. No obvious cyst felt. Doubt fistula due to distance from anus. Mild pruritis ani.    PLAN  1. No intervention for abscess. Do not feel that local excision is indicated as no evidence of cyst. Low suspicion for fistula  2. Discussed pruritis ani treatment and prevention. Calmoseptine samples provided. No clear indication for nifedipine.  3. Return to clinic as needed    Time spent: 30 minutes.  >50% spent in discussing, counseling and coordinating care.    Neil Zhu M.D    Division of Colon and Rectal Surgery  [facility]    Referring Provider:  Referred Self, MD  No address on file     Primary Care Provider:  Ksenia Lylse

## 2020-01-10 NOTE — PROGRESS NOTES
Colon and Rectal Surgery Clinic Note    RE: Joel Pineda.  : 1975.  MARY: 1/10/2020.    Reason for visit: Perineal abscess.    HPI: 44M with history of diverticular disease s/p sigmoidectomy with Dr. Justice presents [facility] recurrent perineal abscess. First occurred about 2 years ago and was drained at the time. Healed without issue and did not cause problems until about 2.5 weeks ago when he developed pain in the area. He applied nifedipine cream that was prescribed by MNJASON several years ago and felt the area swell again. He went to a local ED where it is I&D'ed with drainage of mostly blood. This has now healed again and has not had any pain or drainage for over a week. He comes today for evaluation of underlying cyst or fistula as a cause for recurrent infection.    Denies change in bowel habits. No pain or bleeding with BMs. No abdominal pain. He has been using nifedipine and preparation H for perianal irritation.    Medical history:  Past Medical History:   Diagnosis Date     Acne      Allergic state      Anxiety      Bipolar 2 disorder (H)      Chest pain     Chest pain, regulated w/BP meds. Clear arteries.     Chronic pain      Depressive disorder      Diabetes (H)      Diverticulosis      Gastroesophageal reflux disease      Hypertension      IBS (irritable bowel syndrome)      Intracranial arachnoid cyst      Polyneuropathy      Pulmonary embolism (H)      Skin exam, screening for cancer 12/3/2013     Sleep apnea      Uncomplicated asthma        Surgical history:  Past Surgical History:   Procedure Laterality Date     BACK SURGERY  10/07    lumbar discectomy L5-S1     COLONOSCOPY      Note: colonoscopy scheduled with [facility] on Friday, 9/4/15     COSMETIC SURGERY      Nose Exterior - functional     GI SURGERY  2013    Sigmoidectomy     HERNIA REPAIR, UMBILICAL  11    smallDr. Evan     INCISION AND DRAINAGE, ABSCESS, COMPLEX  11    umbilical, Dr. Evan Beavers     LAPAROSCOPIC  "ASSISTED COLECTOMY LEFT (DESCENDING)  8/15/2013    Procedure: LAPAROSCOPIC ASSISTED COLECTOMY LEFT (DESCENDING);  Laparoscopic Hand Assisted Sigmoid Resection, Mobilization of Splenic Fissure, coloproctoscopy, *Latex Free Room* Anesthesia General with Pain block  ;  Surgeon: Aurora Justice MD;  Location: [facility] OR     NERVE SURGERY  8/18/11    RF ablation @ L3-S1 @ [facility]     RECONSTRUCT NOSE AND SEPTUM (FUNCTIONAL)  10/14/2011    Procedure:RECONSTRUCT NOSE AND SEPTUM (FUNCTIONAL); Functional Septorhinoplasty, Turbinate Reduction, ; Surgeon:CEDRIC CUEVAS; Location:[facility] OR     SINUS SURGERY  10/1/01    ethmoidectomy chronic sinusitis       Family history:  Family History   Problem Relation Age of Onset     Musculoskeletal Disorder Mother         back     Anxiety Disorder Mother      Colon Polyps Mother      Ulcerative Colitis Mother         and ischemic small intestine, surgery     Hypertension Mother      Breast Cancer Mother      Osteoporosis Mother      Diabetes Mother         Type 2, Diagnosed in 2014     Depression Mother         Takes Cymbalta to help with chronic pain + depx     Thyroid Disease Mother         Hypothyroidism     Obesity Mother         Under much better control latter half of 2015     Musculoskeletal Disorder Father         back     Substance Abuse Father      Hypertension Father      Hyperlipidemia Father      Depression Father         Off meds for many years. Seems \"ok\"     Heart Disease Maternal Grandmother      Heart Disease Maternal Grandfather      Psychotic Disorder Paternal Grandfather      Suicide Paternal Grandfather      Depression Paternal Grandfather         Pediatrician. Committed suicide by pistol in 1990.     Musculoskeletal Disorder Brother         back     Depression Brother         Expressed as anger and moodiness     Substance Abuse Sister      Depression Sister         Mental Health Therapist, yet no anti-depressants?     Anxiety Disorder Sister         Mental Health " Therapist, yet no anti-anxiety meds?     Other Cancer Other         Bladder Cancer - Fatal     Substance Abuse Brother      Colon Cancer No family hx of      Crohn's Disease No family hx of      Anesthesia Reaction No family hx of      Cancer No family hx of         No family history of skin cancer       Medications:  Current Outpatient Medications   Medication Sig Dispense Refill     acetaminophen 500 MG CAPS Take 500 mg by mouth every 4 hours as needed 60 capsule      albuterol (PROAIR HFA/PROVENTIL HFA/VENTOLIN HFA) 108 (90 Base) MCG/ACT inhaler Inhale 2 puffs into the lungs every 4 hours as needed for shortness of breath / dyspnea or wheezing 8.5 g 11     ALPRAZolam (XANAX XR) 0.5 MG 24 hr tablet Take 0.5 mg by mouth every morning       aspirin (ASA) 81 MG tablet Take 81 mg by mouth daily        celecoxib (CELEBREX) 200 MG capsule TAKE 1 CAPSULE 2 TIMES     DAILY AS NEEDED MODERATE   PAIN 180 capsule 1     cetirizine (ZYRTEC) 10 MG tablet Take 1 tablet (10 mg) by mouth At Bedtime 30 tablet 11     cholecalciferol (VITAMIN D3) 34036 units (1250 mcg) capsule Take one capsule every two weeks. 24 capsule 1     cyanocobalamin (CYANOCOBALAMIN) 1000 MCG/ML injection Inject 1 mL (1,000 mcg) into the muscle every 30 days 10 mL 0     DULoxetine (CYMBALTA) 60 MG capsule Take 60 mg by mouth daily       EPINEPHrine (EPIPEN/ADRENACLICK/OR ANY BX GENERIC EQUIV) 0.3 MG/0.3ML injection 2-pack Inject 0.3 mLs (0.3 mg) into the muscle once as needed for anaphylaxis 0.6 mL 3     etanercept (ENBREL SURECLICK) 50 MG/ML autoinjector Inject 50 mg Subcutaneous once a week Hold for signs of infection, and seek medical attention. 4 mL 5     famotidine (PEPCID) 10 MG tablet Take 10 mg by mouth Prior to administration of Humira every 2 weeks       fluticasone-salmeterol (ADVAIR DISKUS) 250-50 MCG/DOSE inhaler Inhale 1 puff into the lungs 2 times daily 1 Inhaler 11     Ginger, Zingiber officinalis, (GINGER ROOT) 550 MG CAPS capsule Take 550 mg  by mouth Up to three times daily       hydrOXYzine (ATARAX) 50 MG tablet Take 50 mg by mouth 2 times daily For anxiety and insomnia       lamoTRIgine (LAMICTAL) 100 MG tablet Take 200 mg by mouth daily       levothyroxine (SYNTHROID/LEVOTHROID) 75 MCG tablet TAKE 1 TABLET EVERY MORNING 90 tablet 1     Liniments (SALONPAS EX) Externally apply 1 Application topically daily as needed       loperamide (IMODIUM) 2 MG capsule Take 2 mg by mouth 4 times daily as needed for diarrhea       melatonin 3 MG tablet Take 1 mg by mouth At Bedtime       metFORMIN (GLUCOPHAGE-XR) 500 MG 24 hr tablet Take 2 tablets (1,000 mg) by mouth daily (with dinner) 180 tablet 3     metoclopramide (REGLAN) 5 MG tablet Take 1 tablet (5 mg) by mouth 3 times daily as needed (nausea) 20 tablet 3     metoprolol succinate ER (TOPROL-XL) 200 MG 24 hr tablet Take 1 tablet (200 mg) by mouth 2 times daily 180 tablet 1     montelukast (SINGULAIR) 10 MG tablet Take 1 tablet (10 mg) by mouth every evening 90 tablet 1     nifedipine 0.2% in white petrolatum 0.2 % OINT ointment Apply topically 2 times daily       omega-3 acid ethyl esters (LOVAZA) 1 g capsule TAKE 2 CAPSULES BY MOUTH TWICE DAILY. 360 capsule 3     omeprazole 20 MG tablet Take 20 mg by mouth daily        ondansetron (ZOFRAN) 8 MG tablet TAKE 1 TABLET BY MOUTH EVERY 8 HOURS AS NEEDED FOR NAUSEA OR VOMITING 20 tablet 4     order for DME Equipment being ordered: lumbosacral belt/brace 1 Units 0     order for DME Respironics REMSTAR 60 Series Auto CPAP 9-13 cm H2O, Wisp nasal mask w/a large cushion and a chinstrap       oxyCODONE (ROXICODONE) 5 MG tablet Take 1-2 tablets (5-10 mg) by mouth every 6 hours as needed for pain (maximum 4 tablet(s) per day) 35 tablet 0     pregabalin (LYRICA) 150 MG capsule Take 1 capsule (150 mg) by mouth 2 times daily 60 capsule 5     pseudoePHEDrine (SUDAFED) 120 MG 12 hr tablet Take 1 tablet (120 mg) by mouth every 12 hours 28 tablet 0     QUEtiapine (SEROQUEL) 25 MG  tablet Take 25 mg by mouth 4 times daily as needed       ramipril (ALTACE) 10 MG capsule Take 1 capsule (10 mg) by mouth daily 90 capsule 1     rizatriptan (MAXALT-MLT) 5 MG ODT Take 1 tablet (5 mg) by mouth at onset of headache for migraine 30 tablet 5     rosuvastatin (CRESTOR) 40 MG tablet TAKE 1 TABLET DAILY 90 tablet 1     sulfamethoxazole-trimethoprim (BACTRIM DS/SEPTRA DS) 800-160 MG tablet Take 1 tablet by mouth 2 times daily       syringe, disposable, (BD TUBERCULIN SYRINGE) 1 ML MISC Equipment being ordered: 1 ml tuberculin syringes to be used for Vitamin B12 injections. 12 each 1     vitamin C (ASCORBIC ACID) 500 MG tablet Take 500 mg by mouth daily         Allergies:  Allergies   Allergen Reactions     Amoxicillin-Pot Clavulanate Difficulty breathing     Banana Shortness Of Breath     Pt reports organic Banana is okay.      Nitroglycerin Palpitations     Penicillins Anaphylaxis     Provigil [Modafinil] Shortness Of Breath     headache     Gadolinium Hives and Itching     Patient was premedicated for the contrast allergy. He did still have a reaction a few hours after injection. Hives and itching. Dr. Gomez told tech to inform pt he should only have contrast again in the future when premedicated and at a hospital. Not at an outpatient facility.      Ketoconazole      Topical cream caused swelling and itching     Dye [Contrast Dye] Other (See Comments) and Hives     Moderate flushing, CT contrast     Golimumab      Hives, bradycardia, face swelling     Neurontin [Gabapentin] Hives     Moderate hives     Nortriptyline Hives     Varicella Virus Vaccine Live      Rash     Flagyl [Metronidazole Hcl] Palpitations and Hives     Latex Rash     Metronidazole Palpitations, Other (See Comments) and Rash     dizziness (versus ciprofloxacin taken at same time)     No Clinical Screening - See Comments Rash     Nitrile gloves       Social history:   Social History     Tobacco Use     Smoking status: Never Smoker  "    Smokeless tobacco: Never Used   Substance Use Topics     Alcohol use: Yes     Alcohol/week: 0.0 standard drinks     Drinks per session: 1 or 2     Binge frequency: Weekly     Comment: occ 1/week     Marital status: single.    ROS:  A complete review of systems was performed with the patient and all systems negative except as per HPI.    Physical Examination:  Exam was chaperoned by Pablo Pineda LPN  BP (!) 131/90 (BP Location: Left arm, Patient Position: Sitting, Cuff Size: Adult Large)   Pulse 91   Temp 97.7  F (36.5  C) (Oral)   Ht 1.956 m (6' 5\")   Wt 146.8 kg (323 lb 11.2 oz)   SpO2 98%   BMI 38.39 kg/m    General: Well hydrated. No acute distress.  Abdomen: Soft, NT.  Perianal external examination:  Perianal skin: intact with mild pruritis  Lesions: I&D site anterior perineum well healed. ~10cm from anus. Soft, no fluctuance.  Eversion of buttocks: There was not evidence of an anal fissure. Details: N/A.  Skin tags or external hemorrhoids: None.  Digital rectal examination: Was performed.   Sphincter tone: Good.  Palpable lesions: No.    Anoscopy: Was performed.   Hemorrhoids: No significant internal hemorrhoids.  Lesions: No    Laboratory values reviewed:  Recent Labs   Lab Test 12/27/19  1209 09/26/19  1010  03/18/15  0912   WBC 8.0 7.7   < >  --    HGB 14.9 15.2   < >  --     262   < >  --    CR  --  0.83   < >  --    ALBUMIN  --  4.3   < >  --    BILITOTAL  --  0.4   < >  --    ALKPHOS  --  107   < >  --    ALT  --  53   < >  --    AST  --  48*   < >  --    INR  --   --   --  0.90    < > = values in this interval not displayed.     ASSESSMENT  44M with recurrent perineal abscess, now healed. No active infection. No obvious cyst felt. Doubt fistula due to distance from anus. Mild pruritis ani.    PLAN  1. No intervention for abscess. Do not feel that local excision is indicated as no evidence of cyst. Low suspicion for fistula  2. Discussed pruritis ani treatment and prevention. Calmoseptine " samples provided. No clear indication for nifedipine.  3. Return to clinic as needed    Time spent: 30 minutes.  >50% spent in discussing, counseling and coordinating care.    Neil Zhu M.D    Division of Colon and Rectal Surgery  [facility]    Referring Provider:  Referred Self, MD  No address on file     Primary Care Provider:  Ksenia Lyles

## 2020-01-10 NOTE — NURSING NOTE
"Chief Complaint   Patient presents with     Consult     Perineal abscess       Vitals:    01/10/20 1145   BP: (!) 131/90   BP Location: Left arm   Patient Position: Sitting   Cuff Size: Adult Large   Pulse: 91   Temp: 97.7  F (36.5  C)   TempSrc: Oral   SpO2: 98%   Weight: 323 lb 11.2 oz   Height: 6' 5\"       Body mass index is 38.39 kg/m .      Pablo Pineda LPN                        "

## 2020-01-10 NOTE — PATIENT INSTRUCTIONS
Follow up as needed.   Follow the instructions below for itchin) Fold a gauze in the buttock crease and determine whether there is any fecal staining that could be contributing and to keep area dry    2) Calmoseptine cream or zinc cream once to twice a day    3) Avoid scratching to avoid further trauma    4) Increase fiber and water in diet    5) Diet modification-try avoiding spicy foods, caffeine, tomatoes, carbonated beverages, milk products, cheese, chocolate, nuts, etc.    6) Use wet wipes rather than toilet paper. May also try washing and then using a blow dryer to avoid trauma of wiping with toilet paper.    7) Try wearing only cotton underwear    8) Avoid perfume-containing soaps (can try Dove soap).    9) Fiber supplement such as Metamucil, Citrucel, or Benefiber to bulk up stools    10) Avoid scratching (can wear mittens at night to avoid trauma of scratching)     Please call with any questions or concerns regarding your clinic visit today.    It is a pleasure being involved in your health care.    Contacts post-consultation depending on your need:    Radiology Appointments [phone]    Schedule Clinic Appointments [phone] # 1   M-F 7:30 - 5 pm    NIRAJ Shah [phone]    Clinic Fax Number [phone]    Surgery Scheduling [phone]    My Chart is available 24 hours a day and is a secure way to access your records and communicate with your care team.  I strongly recommend signing up if you haven't already done so, if you are comfortable with computers.  If you would like to inquire about this or are having problems with My Chart access, you may call [phone] or go online at [email or web address].  Please allow at least 24 hours for a response and extra time on weekends and Holidays.

## 2020-01-14 NOTE — TELEPHONE ENCOUNTER
Free Drug Application Approved  Effective Dates 01/14/20 to 12/31/20  Patient notified? yes  Additional Information

## 2020-01-24 ENCOUNTER — MYC MEDICAL ADVICE (OUTPATIENT)
Dept: NEUROLOGY | Facility: CLINIC | Age: 45
End: 2020-01-24

## 2020-01-24 ENCOUNTER — OFFICE VISIT (OUTPATIENT)
Dept: FAMILY MEDICINE | Facility: CLINIC | Age: 45
End: 2020-01-24
Payer: MEDICARE

## 2020-01-24 VITALS
BODY MASS INDEX: 37.19 KG/M2 | SYSTOLIC BLOOD PRESSURE: 126 MMHG | HEIGHT: 77 IN | HEART RATE: 77 BPM | TEMPERATURE: 98.2 F | WEIGHT: 315 LBS | OXYGEN SATURATION: 99 % | DIASTOLIC BLOOD PRESSURE: 85 MMHG

## 2020-01-24 DIAGNOSIS — G89.29 CHRONIC MIDLINE LOW BACK PAIN WITHOUT SCIATICA: ICD-10-CM

## 2020-01-24 DIAGNOSIS — S01.112A LACERATION OF LEFT EYEBROW, INITIAL ENCOUNTER: ICD-10-CM

## 2020-01-24 DIAGNOSIS — E11.8 TYPE 2 DIABETES MELLITUS WITH COMPLICATION, WITHOUT LONG-TERM CURRENT USE OF INSULIN (H): ICD-10-CM

## 2020-01-24 DIAGNOSIS — R61 GENERALIZED HYPERHIDROSIS: ICD-10-CM

## 2020-01-24 DIAGNOSIS — K31.84 GASTROPARESIS: Primary | ICD-10-CM

## 2020-01-24 DIAGNOSIS — M54.50 CHRONIC MIDLINE LOW BACK PAIN WITHOUT SCIATICA: ICD-10-CM

## 2020-01-24 LAB — HBA1C MFR BLD: 6 % (ref 0–5.6)

## 2020-01-24 PROCEDURE — 82043 UR ALBUMIN QUANTITATIVE: CPT | Performed by: FAMILY MEDICINE

## 2020-01-24 PROCEDURE — 99214 OFFICE O/P EST MOD 30 MIN: CPT | Performed by: FAMILY MEDICINE

## 2020-01-24 PROCEDURE — 83036 HEMOGLOBIN GLYCOSYLATED A1C: CPT | Performed by: FAMILY MEDICINE

## 2020-01-24 PROCEDURE — 80053 COMPREHEN METABOLIC PANEL: CPT | Performed by: FAMILY MEDICINE

## 2020-01-24 PROCEDURE — 84443 ASSAY THYROID STIM HORMONE: CPT | Performed by: FAMILY MEDICINE

## 2020-01-24 PROCEDURE — 36415 COLL VENOUS BLD VENIPUNCTURE: CPT | Performed by: FAMILY MEDICINE

## 2020-01-24 RX ORDER — GLYCOPYRROLATE 2 MG/1
2 TABLET ORAL 2 TIMES DAILY
Qty: 60 TABLET | Refills: 0 | Status: SHIPPED | OUTPATIENT
Start: 2020-01-24 | End: 2020-01-28

## 2020-01-24 ASSESSMENT — MIFFLIN-ST. JEOR: SCORE: 2457.96

## 2020-01-24 NOTE — PROGRESS NOTES
"Subjective     Joel Pineda is a 44 year old male who presents to clinic today for the following health issues:    HPI   ED/UC Followup:    Facility:  [facility]   Date of visit: 1/24/2020  Reason for visit: Laceration of Left Eyebrow   Current Status: Patient has had no problems with stitches, 4 stiches were placed. Patient was sleeping when he had a nightmare and hit head on nightstand, believes this might be related to anxiety. No vision changes per patient, Patient using Mupirocin ointment on laceration.      SUBJECTIVE:  Here today to discuss gastroparesis.  Patient is well-known to me and has been working with [employer] for an incomplete gastric emptying.  Emptying study done at [facility] showed 58% retention with a normal less than 10%.  So they discussed having him meet with a nutritionist to discuss a proper diet and the consideration for Reglan at some point in the future.  He has been on and off of this in the past.  We discussed potential causes, the most common of which is idiopathic.  He does carry a history of diabetes and does use pain medication intermittently, but not regularly.  Only other concern is a longstanding history of hyperhidrosis that he says goes back to high school years.  He used to use Drysol in his axillae.  Hands and pets are a big concern.      Since scheduling the appointment he awoke yesterday after having a nightmare and running into a shelf in his apartment.  Received a laceration above his left eye and 4 sutures were placed.    Review of systems otherwise negative.  Past medical, family, and social history reviewed and updated in chart.    OBJECTIVE:  /85 (BP Location: Right arm, Patient Position: Chair, Cuff Size: Adult Large)   Pulse 77   Temp 98.2  F (36.8  C) (Oral)   Ht 1.956 m (6' 5\")   Wt 146.1 kg (322 lb)   SpO2 99%   BMI 38.18 kg/m    Alert, pleasant, upbeat, and in no apparent discomfort.  S1 and S2 normal, no murmurs, clicks, " gallops or rubs. Regular rate and rhythm. Chest is clear; no wheezes or rales. No edema or JVD.  Bruising and a healing laceration just above left eye  Past labs reviewed with the patient.     ASSESSMENT / PLAN:  (K31.84) Gastroparesis  (primary encounter diagnosis)  Comment: At this point we do not have a causality for this and as I discussed with the patient it is difficult to pinpoint often.  But he does have a few risk factors.  Diabetes is well controlled.  He is due for a check of his thyroid and that may play a role.  Narcotics may play a role.  Difficult to pinpoint other medications of his but some of these may have additive effects.  In the meantime he will meet with a dietitian to discuss diet  Plan:     (S01.112A) Laceration of left eyebrow, initial encounter  Comment: Sutures out in 4 days  Plan:     (E11.8) Type 2 diabetes mellitus with complication, without long-term current use of insulin (H)  Comment: recheck and adjust as needed  Plan: Hemoglobin A1c, Comprehensive metabolic panel,         Albumin Random Urine Quantitative with Creat         Ratio, TSH with free T4 reflex            (M54.5,  G89.29) Chronic midline low back pain without sciatica  Comment: We will try to minimize the use of narcotics given his gastroparesis  Plan:     (R61) Generalized hyperhidrosis  Comment: Discussed mechanism of action of the proposed medication, as well as potential effects, both good and bad.  Patient expressed understanding and agreed with treatment.   Plan: glycopyrrolate (GLYCATE) 2 MG tablet            Follow up 1 week based upon results  JA Lyles MD    (Chart documentation completed in part with Dragon voice-recognition software.  Even though reviewed some grammatical, spelling, and word errors may remain.)

## 2020-01-24 NOTE — TELEPHONE ENCOUNTER
Received request from pharmacy for refill of patients   Disp Refills Start End    PARoxetine (PAXIL) 40 MG tablet 90 tablet 1 6/10/2019     Sig: TAKE 1 TABLET EVERY DAY       Disp Refills Start End    metoPROLOL succinate (TOPROL-XL) 100 MG 24 hr tablet 90 tablet 3 3/15/2019     Si mg(0.5 tabs) po daily      Last seen, 10/28/19, Medicare Wellness Visit.  Pending appointment, 20, follow up DM.    Contacted patient informed that [facility] is requesting this refill, is this where she would like the refill sent to, in the past we sent refills to [facility]. Patient states that she called for the refills to late,she will be out of the medication prior to receiving them in the mail.     Patient requesting a 90 day supply to be sent to [facility].  Refilled per protocol.     Routing refill request to provider for review/approval because:  Drug not on the [facility] refill protocol     Martell Chaves RN, BSN

## 2020-01-25 ENCOUNTER — MYC REFILL (OUTPATIENT)
Dept: FAMILY MEDICINE | Facility: CLINIC | Age: 45
End: 2020-01-25

## 2020-01-25 DIAGNOSIS — M51.369 DDD (DEGENERATIVE DISC DISEASE), LUMBAR: ICD-10-CM

## 2020-01-25 DIAGNOSIS — M45.8 ANKYLOSING SPONDYLITIS OF SACRAL REGION (H): ICD-10-CM

## 2020-01-27 LAB
ALBUMIN SERPL-MCNC: 4.4 G/DL (ref 3.4–5)
ALP SERPL-CCNC: 104 U/L (ref 40–150)
ALT SERPL W P-5'-P-CCNC: 61 U/L (ref 0–70)
ANION GAP SERPL CALCULATED.3IONS-SCNC: 6 MMOL/L (ref 3–14)
AST SERPL W P-5'-P-CCNC: 60 U/L (ref 0–45)
BILIRUB SERPL-MCNC: 0.4 MG/DL (ref 0.2–1.3)
BUN SERPL-MCNC: 14 MG/DL (ref 7–30)
CALCIUM SERPL-MCNC: 9.2 MG/DL (ref 8.5–10.1)
CHLORIDE SERPL-SCNC: 106 MMOL/L (ref 94–109)
CO2 SERPL-SCNC: 24 MMOL/L (ref 20–32)
CREAT SERPL-MCNC: 0.81 MG/DL (ref 0.66–1.25)
CREAT UR-MCNC: 57 MG/DL
GFR SERPL CREATININE-BSD FRML MDRD: >90 ML/MIN/{1.73_M2}
GLUCOSE SERPL-MCNC: 99 MG/DL (ref 70–99)
MICROALBUMIN UR-MCNC: 12 MG/L
MICROALBUMIN/CREAT UR: 21.38 MG/G CR (ref 0–17)
POTASSIUM SERPL-SCNC: 4.4 MMOL/L (ref 3.4–5.3)
PROT SERPL-MCNC: 7.7 G/DL (ref 6.8–8.8)
SODIUM SERPL-SCNC: 136 MMOL/L (ref 133–144)
TSH SERPL DL<=0.005 MIU/L-ACNC: 2.17 MU/L (ref 0.4–4)

## 2020-01-27 NOTE — TELEPHONE ENCOUNTER
Requested Prescriptions   Pending Prescriptions Disp Refills     oxyCODONE (ROXICODONE) 5 MG tablet 35 tablet 0     Sig: Take 1-2 tablets (5-10 mg) by mouth every 6 hours as needed for pain (maximum 4 tablet(s) per day)       There is no refill protocol information for this order          oxyCODONE (ROXICODONE) 5 MG tablet      Last Written Prescription Date:  1/9/2020  Last Fill Quantity: 35,   # refills: 0  Last Office Visit: 1/24/2020  Future Office visit:    Next 5 appointments (look out 90 days)    Jan 30, 2020  3:40 PM CST  MyChart Injury Follow Up with Houston Koroma MD  [facility] ([facility]) [address] [address]  [phone]   Feb 03, 2020  2:00 PM CST  Return Visit with Ramakrishna Hernandez MD  [facility] ([facility]) [address] [place] [id number] [phone]   Apr 01, 2020  1:00 PM CDT  [facility] with [facility]il Baxter MD  [facility] [address]  [place] [address]  [phone]           Routing refill request to provider for review/approval because:  Drug not on the [facility], P or [facility] refill protocol or controlled substance

## 2020-01-28 ENCOUNTER — ALLIED HEALTH/NURSE VISIT (OUTPATIENT)
Dept: PHARMACY | Facility: CLINIC | Age: 45
End: 2020-01-28
Payer: COMMERCIAL

## 2020-01-28 ENCOUNTER — MYC REFILL (OUTPATIENT)
Dept: FAMILY MEDICINE | Facility: CLINIC | Age: 45
End: 2020-01-28

## 2020-01-28 DIAGNOSIS — M45.8 ANKYLOSING SPONDYLITIS OF SACRAL REGION (H): ICD-10-CM

## 2020-01-28 DIAGNOSIS — E53.8 VITAMIN B12 DEFICIENCY (NON ANEMIC): Primary | ICD-10-CM

## 2020-01-28 DIAGNOSIS — G89.4 CHRONIC PAIN SYNDROME: ICD-10-CM

## 2020-01-28 DIAGNOSIS — R11.0 NAUSEA: ICD-10-CM

## 2020-01-28 DIAGNOSIS — E78.5 HYPERLIPIDEMIA LDL GOAL <100: ICD-10-CM

## 2020-01-28 DIAGNOSIS — J30.1 ALLERGIC RHINITIS DUE TO POLLEN, UNSPECIFIED SEASONALITY: ICD-10-CM

## 2020-01-28 DIAGNOSIS — K21.9 GASTROESOPHAGEAL REFLUX DISEASE WITHOUT ESOPHAGITIS: ICD-10-CM

## 2020-01-28 DIAGNOSIS — J45.31 MILD PERSISTENT ASTHMA WITH ACUTE EXACERBATION: ICD-10-CM

## 2020-01-28 DIAGNOSIS — K31.84 GASTROPARESIS: ICD-10-CM

## 2020-01-28 DIAGNOSIS — E11.9 TYPE 2 DIABETES MELLITUS WITHOUT COMPLICATION, WITHOUT LONG-TERM CURRENT USE OF INSULIN (H): ICD-10-CM

## 2020-01-28 DIAGNOSIS — F41.8 DEPRESSION WITH ANXIETY: ICD-10-CM

## 2020-01-28 DIAGNOSIS — G43.919 INTRACTABLE MIGRAINE WITHOUT STATUS MIGRAINOSUS, UNSPECIFIED MIGRAINE TYPE: ICD-10-CM

## 2020-01-28 DIAGNOSIS — I10 ESSENTIAL HYPERTENSION WITH GOAL BLOOD PRESSURE LESS THAN 140/90: ICD-10-CM

## 2020-01-28 DIAGNOSIS — G47.00 INSOMNIA, UNSPECIFIED TYPE: ICD-10-CM

## 2020-01-28 DIAGNOSIS — R19.7 DIARRHEA, UNSPECIFIED TYPE: ICD-10-CM

## 2020-01-28 DIAGNOSIS — E03.9 HYPOTHYROIDISM, UNSPECIFIED TYPE: ICD-10-CM

## 2020-01-28 DIAGNOSIS — E55.9 VITAMIN D DEFICIENCY: ICD-10-CM

## 2020-01-28 DIAGNOSIS — R61 GENERALIZED HYPERHIDROSIS: ICD-10-CM

## 2020-01-28 PROCEDURE — 99605 MTMS BY PHARM NP 15 MIN: CPT | Performed by: PHARMACIST

## 2020-01-28 PROCEDURE — 99607 MTMS BY PHARM ADDL 15 MIN: CPT | Performed by: PHARMACIST

## 2020-01-28 RX ORDER — FAMOTIDINE 20 MG/1
TABLET, FILM COATED ORAL
Start: 2020-01-28 | End: 2023-06-10

## 2020-01-28 NOTE — PROGRESS NOTES
SUBJECTIVE/OBJECTIVE:                           Joel Pineda is a 44 year old male called for an initial visit for Medication [facility] for 2020.  He was referred to me from Ksenia Lyles.     Chief Complaint: Glycopyrrolate prescribed for hyperhydrosis; changed from Humira to Enbrel    Allergies/ADRs: Reviewed in Epic  Tobacco: No tobacco use  Alcohol: none  Caffeine: not assessed today  Activity: patient will start exercising, pool and walking track  PMH: Reviewed in Epic    Gastroparesis: patient was recently diagnosed with gastroparesis. Patient finds metoclopramide 5mg effective. Patient takes twice daily.  Patient is supposed to have some further testing at [facility].    Asthma:  Current asthma medications: Advair 250/50 1 puff twice daily, Albuterol MDI, montelukast 10mg daily.  Asthma triggers include: upper respiratory infections. Patient feels like his breathing is better and he hasn't had to use his albuterol as much.  Pt reports the following symptoms: none.  AAP on file: YES; needs updating  PIF was completed today: No  ACT Total Scores 11/6/2019 12/12/2019 12/16/2019   ACT TOTAL SCORE - - -   ASTHMA ER VISITS - - -   ASTHMA HOSPITALIZATIONS - - -   ACT TOTAL SCORE (Goal Greater than or Equal to 20) 20 19 20   In the past 12 months, how many times did you visit the emergency room for your asthma without being admitted to the hospital? 0 0 0   In the past 12 months, how many times were you hospitalized overnight because of your asthma? 0 0 0     Ankylosing Spondylitis: Current medications include Enbrel 50mg weekly. Patient had his first injection last week. Patient was switched from Humira to Enbrel because of headaches and burning mouth. Patient follows with Dr. Baxter, Rheumatology. Patient reports the Enbrel injections are more painful. Patient reports it is the needle that is more painful. Patient has been rotating his injection sites. Before Enbrel injection he will take  diphenhydramine, famotidine 20mg and albuterol to prevent any reactions. Patient did not have a reaction with his first dose.     Mood/Anxiety/Insomnia: current therapy includes lamictal 200mg daily, duloxetine 60mg twice daily, hydroxyzine 50mg in the morning and 100mg at night (for anxiety and insomnia), Xanax XR 0.5mg up to three times daily as needed, patient has been taking 1mg before bedtime because he has been having more nightmares in the middle of the night,  quetiapine 50mg as needed if he is really agitated (uses once every 10 days), melatonin 3mg at bedtime.  Patient's psychiatrist is Dr. Rodriguez at [facility] in [place]. Patient has been feeling more anxious. Patient graduated from year long program at [facility].  Sees therapist weekly at [facility].     Migraine:  Current therapy includes rizatriptan 5mg MLT as needed. Patient is having 5 headaches per month. Patient reports this is effective for relieving the headache.     Vitamin D Deficiency: current therapy includes Vitamin D 50,000 units every two weeks. Patient's last level wnl.     Hypertension: Current medications include metoprolol XL 200mg twice daily, ramipril 10mg daily. Patient does not self-monitor BP but has a machine.  Patient reports no current medication side effects.    Pain: current therapy includes APAP as needed-patient is taking  500 mg 2-3 times daily, celebrex 200mg bid prn (pt is taking twice daily scheduled), lyrica 150mg twice daily. Patient has not had to take any oxycodone since he started Enbrel. Before he was averaging about 2 tablets every other day. Patient is doing physical therapy at [facility].     Hyperlipidemia: Current therapy includes rosuvastatin 40mg once daily and Lovaza 2gm twice daily. Patient denies side effects.     Supplements: current therapy includes Vitamin B 12 inj 1000mcg every 30 days. Last Vitamin B12 level was 1/2019 and was wnl.    Allergies: current therapy includes cetirizine 10mg  daily. Patient is tolerating and working well.     Hypothyroidism: Patient is taking levothyroxine 75 mcg daily. Patient is having the following symptoms: none.   TSH   Date Value Ref Range Status   01/24/2020 2.17 0.40 - 4.00 mU/L Final   ]  Diarrhea: current therapy includes loperamide 2mg up to 4 times daily as needed. Patient takes about once a week.     GERD: Current medications include: Prilosec (omeprazole) 20 once daily. Pt c/o no current symptoms.  Patient feels that current regimen is effective.    Nausea: current therapy includes ginger root 550mg daily, peppermint oil. Just reintroduced these a couple of days ago.     Diabetes:  Pt currently taking metformin XR 1000mg daily. Pt is not experiencing side effects.  SMBG: rarely.     Patient is not experiencing hypoglycemia  Recent symptoms of high blood sugar? none  Eye exam: due; sees Dr. Hernandez next week.  Foot exam: up to date  ACEi/ARB: Yes: Ramipril.   Urine Albumin:   Lab Results   Component Value Date    UMALCR 21.38 (H) 01/24/2020      Aspirin: Taking 81mg daily and denies side effects    Hyperhydrosis: has been happening for the last 6-9 months. Patient was prescribed glycopyrrolate. Patient reports this is not every day but is most days, mostly in the mornings. Patient report that this is occurring everywhere. Patient doesn't feel like it is correlated with increase dose in duloxetine. Patient is not going to fill the glycopyrrolate because it could potentially contribute to slower motility.    ASSESSMENT:                             Current medications were reviewed today.     Medication Adherence: no issues identified    Gastroparesis: Needs improvement. Patient is on several medications that could slow down motility (oxycodone-although not consistent use, hydroxyzine, glycopyrrolate-although patient has not started, ondansetron-patient no longer taking, potentially duloxetine-venlafaxine can so possibly duloxetine as well.  Patient would  benefit from minimizing his use of hydroxyzine (patient is taking more than prescribed) and oxycodone.    Asthma: Stable.  Patient is meeting ACT goal of greater than 20.    Ankylosing Spondylitis: Needs improvement. Patient would benefit from avoiding premedicating with famotidine, albuterol and diphenhydramine. There is no evidence to support the use of pre-medications prior to subcutaneous injection. Famotidine can also interfere with quetiapine, hydroxyzine which can cause QT prolongation. Patient's last EKG 3/25/19 QTc 401.    Mood/Anxiety/Insomnia: Stable.  Patient in close follow-up with psychiatry.    Migraine: Stable    Vitamin D Deficiency: Stable    Hypertension: Stable. Patient is meeting goal <140/90mmHg.    Pain: Stable.    Hyperlipidemia: Stable.    Supplements: Needs improvement. Patient would benefit from updated Vitamin B12 level    Allergies: Stable.    Hypothyroidism: Stable.    Diarrhea: Stable.    GERD: Stable.    Nausea: Stable.    Diabetes: Stable.    Hyperhydrosis: Needs improvement. Glycopyrrolate can slow down motility and patient chooses not to take at this time. Duloxetine can cause hyperhydrosis but symptoms aren't happening consistently which medication cause is more unlikely. Patient will continue to monitor.    PLAN:                          Recommend updated B12 level. Order placed.  Recommend taking hydroxyzine as prescribed  Recommend not pre-medicating with famotidine prior to Enbrel injection.    I spent 60 minutes with this patient today.  All changes were made via collaborative practice agreement with Ksenia Lyles. A copy of the visit note was provided to the patient's primary care provider.    Will follow up in 8 weeks.    The patient was sent via [employer] a summary of these recommendations as an after visit summary.     Radha Mcdonald, Pharm.D, BCACP  Medication Therapy Management Pharmacist

## 2020-01-28 NOTE — PATIENT INSTRUCTIONS
Recommendations from today's MTM visit:                                                      Recommend updated B12 level. Order placed.  Recommend taking hydroxyzine as prescribed  Recommend not pre-medicating with famotidine prior to Enbrel injection.    It was great to speak with you today.  I value your experience and would be very thankful for your time with providing feedback on our clinic survey. You may receive a survey via email or text message in the next few days.     Next MTM visit: 2 months    To schedule another MTM appointment, please call the clinic directly or you may call the MTM scheduling line at [phone] or toll-free at [phone].     My Clinical Pharmacist's contact information:                                                      It was a pleasure talking with you today!  Please feel free to contact me with any questions or concerns you have.      Radha Mcdonald, Pharm.D, [facility]  Medication Therapy Management Pharmacist

## 2020-01-29 RX ORDER — OXYCODONE HYDROCHLORIDE 5 MG/1
5-10 TABLET ORAL EVERY 6 HOURS PRN
Qty: 35 TABLET | Refills: 0 | Status: SHIPPED | OUTPATIENT
Start: 2020-01-29 | End: 2020-02-20

## 2020-01-29 NOTE — TELEPHONE ENCOUNTER
"Requested Prescriptions   Pending Prescriptions Disp Refills     metoclopramide (REGLAN) 5 MG tablet 20 tablet 3     Sig: Take 1 tablet (5 mg) by mouth 3 times daily as needed (nausea)        Antivertigo/Antiemetic Agents Passed - 1/29/2020  6:53 AM        Passed - Recent (12 mo) or future (30 days) visit within the authorizing provider's specialty     Patient has had an office visit with the authorizing provider or a provider within the authorizing providers department within the previous 12 mos or has a future within next 30 days. See \"Patient Info\" tab in inbasket, or \"Choose Columns\" in Meds & Orders section of the refill encounter.              Passed - Medication is active on med list        Passed - Patient is 18 years of age or older          metoclopramide (REGLAN) 5 MG tablet  Last Written Prescription Date:  7/25/19  Last Fill Quantity: 20,  # refills: 3   Last office visit: 1/24/2020 with prescribing provider:  Dr. Lyles   Future Office Visit:   Next 5 appointments (look out 90 days)    Jan 30, 2020  3:40 PM CST  MyChart Injury Follow Up with Houston Koroma MD  [facility] ([facility]) [address] [facility] [id number] [phone]   Feb 03, 2020  2:00 PM CST  Return Visit with Ramakrishna Hernandez MD  [facility] ([facility] [id number] [place] [id number] [phone]   Apr 22, 2020  2:40 PM CDT  Return Visit with Oneil Baxter MD  [facility] ([facility]) [address] [place] [address]  [place] [id number] [phone]           "

## 2020-01-29 NOTE — TELEPHONE ENCOUNTER
Controlled Substance Refill Request for oxycodone 5mg  Problem List Complete:  Yes    Noted:  4/4/2017   Priority:  Medium   Overview Addendum 11/5/2019  3:04 PM by Sapna Barragan RN   Patient is followed by Ksenia Lyles MD for ongoing prescription of pain medication.  All refills should only be approved by this provider, or covering partner.     Medication(s): oxy 5.   Maximum quantity per month: 40  Clinic visit frequency required: Q3  months      Controlled substance agreement: 6/12/19  Encounter-Level CSA - 04/04/2017:            Controlled Substance Agreement - Scan on 4/11/2017  1:30 PM : CONTROLLED SUBSTANCE AGREEMENT (below)           checked in past 3 months?  Yes Last [facility] website verification:  11/5/19      Last office visit:  01/24/2020    Last refill:  oxyCODONE (ROXICODONE) 5 MG tablet 35 tablet 0 1/9/2020  No   Sig - Route: Take 1-2 tablets (5-10 mg) by mouth every 6 hours as needed for pain (maximum 4 tablet(s) per day) - Oral   Sent to pharmacy as: oxyCODONE (ROXICODONE) 5 MG tablet   Class: E-Prescribe   Earliest Fill Date: 1/9/2020   Order: [id number]     Deb Mendoza RN  [facility]

## 2020-01-30 ENCOUNTER — ALLIED HEALTH/NURSE VISIT (OUTPATIENT)
Dept: NURSING | Facility: CLINIC | Age: 45
End: 2020-01-30
Payer: MEDICARE

## 2020-01-30 DIAGNOSIS — E53.8 VITAMIN B12 DEFICIENCY (NON ANEMIC): ICD-10-CM

## 2020-01-30 DIAGNOSIS — Z48.02 VISIT FOR SUTURE REMOVAL: Primary | ICD-10-CM

## 2020-01-30 LAB — VIT B12 SERPL-MCNC: 511 PG/ML (ref 193–986)

## 2020-01-30 PROCEDURE — 82607 VITAMIN B-12: CPT | Performed by: FAMILY MEDICINE

## 2020-01-30 PROCEDURE — 36415 COLL VENOUS BLD VENIPUNCTURE: CPT | Performed by: FAMILY MEDICINE

## 2020-01-30 RX ORDER — METOCLOPRAMIDE 5 MG/1
5 TABLET ORAL 3 TIMES DAILY PRN
Qty: 20 TABLET | Refills: 3 | Status: SHIPPED | OUTPATIENT
Start: 2020-01-30 | End: 2020-02-17

## 2020-01-30 NOTE — RESULT ENCOUNTER NOTE
Tito,  Everything looks great - especially the sugar level.  So I don't know that that is related to the nerve issues.  Keep up the good work.  JA Lyles M.D.

## 2020-01-30 NOTE — NURSING NOTE
Joel presents to the clinic today for  removal of sutures.  The patient has had the sutures in place for 7 days.    There has been no history of infection or drainage.    O: 4 sutures are seen located on the left eyebrow.  The wound is healing well with no signs of infection.    Tetanus status is up to date.    A: Suture removal.    P:  All sutures were easily removed today.  Routine wound care discussed.  The patient will follow up as needed.      Dianne Henderson RN  [facility]/ [facility]

## 2020-01-31 NOTE — TELEPHONE ENCOUNTER
Prescription approved per G Refill Protocol.    Dianne Henderson RN  [facility]/ [facility]

## 2020-02-01 ENCOUNTER — MYC MEDICAL ADVICE (OUTPATIENT)
Dept: PULMONOLOGY | Facility: CLINIC | Age: 45
End: 2020-02-01

## 2020-02-01 DIAGNOSIS — J45.30 MILD PERSISTENT ASTHMA, UNSPECIFIED WHETHER COMPLICATED: Primary | ICD-10-CM

## 2020-02-03 ENCOUNTER — MYC REFILL (OUTPATIENT)
Dept: FAMILY MEDICINE | Facility: CLINIC | Age: 45
End: 2020-02-03

## 2020-02-03 ENCOUNTER — OFFICE VISIT (OUTPATIENT)
Dept: OPHTHALMOLOGY | Facility: CLINIC | Age: 45
End: 2020-02-03
Payer: MEDICARE

## 2020-02-03 DIAGNOSIS — H57.13 PAIN AROUND BOTH EYES: Primary | ICD-10-CM

## 2020-02-03 DIAGNOSIS — E11.8 TYPE 2 DIABETES MELLITUS WITH COMPLICATION, WITHOUT LONG-TERM CURRENT USE OF INSULIN (H): ICD-10-CM

## 2020-02-03 PROCEDURE — 92015 DETERMINE REFRACTIVE STATE: CPT | Performed by: OPHTHALMOLOGY

## 2020-02-03 PROCEDURE — 92014 COMPRE OPH EXAM EST PT 1/>: CPT | Performed by: OPHTHALMOLOGY

## 2020-02-03 ASSESSMENT — CONF VISUAL FIELD
METHOD: COUNTING FINGERS
OD_NORMAL: 1
OS_NORMAL: 1

## 2020-02-03 ASSESSMENT — VISUAL ACUITY
CORRECTION_TYPE: GLASSES
OS_CC: 20/40
METHOD: SNELLEN - LINEAR
OS_CC+: -2
OD_CC: 20/40

## 2020-02-03 ASSESSMENT — REFRACTION_MANIFEST
OD_AXIS: 030
OS_ADD: +1.25
OD_CYLINDER: +1.00
OD_ADD: +1.25
OS_CYLINDER: +1.75
OS_AXIS: 100
OD_SPHERE: -3.75
OS_SPHERE: -4.75

## 2020-02-03 ASSESSMENT — TONOMETRY
OD_IOP_MMHG: 12
IOP_METHOD: ICARE
OS_IOP_MMHG: 12

## 2020-02-03 ASSESSMENT — REFRACTION_WEARINGRX
OD_ADD: +1.25
OS_ADD: +1.25
OD_AXIS: 045
SPECS_TYPE: PAL
OD_SPHERE: -3.50
OS_AXIS: 114
OS_CYLINDER: +1.50
OD_CYLINDER: +1.25
OS_SPHERE: -4.00

## 2020-02-03 ASSESSMENT — SLIT LAMP EXAM - LIDS
COMMENTS: NORMAL
COMMENTS: NORMAL

## 2020-02-03 ASSESSMENT — CUP TO DISC RATIO
OS_RATIO: 0.3
OD_RATIO: 0.3

## 2020-02-03 ASSESSMENT — EXTERNAL EXAM - LEFT EYE: OS_EXAM: NORMAL

## 2020-02-03 ASSESSMENT — EXTERNAL EXAM - RIGHT EYE: OD_EXAM: NORMAL

## 2020-02-03 NOTE — PROGRESS NOTES
Distance Vision Acuity: Satisfied with vision     Near Vision Acuity: Not satisfied      Eye Comfort: dry  Do you use eye drops? : Yes: systane ultra tid  Occupation or Hobbies: reading and computer work     MARIA ISABEL Brown        Medical, surgical and family histories reviewed and updated 1/16/2019.        OBJECTIVE: See Ophthalmology exam     ASSESSMENT:      ICD-10-CM     1. Myopia of both eyes with astigmatism and presbyopia H52.13       H52.203       H52.4     2. DM type 2 without retinopathy (H) E11.9        PLAN: RTC 1 year     Joel Pineda aware  eye exam results will be sent to Ksenia Lyles.  Patient Instructions   Prescription given for glasses.

## 2020-02-03 NOTE — NURSING NOTE
"Chief Complaints and History of Present Illnesses   Patient presents with     Diabetic Eye Exam       Chief Complaint(s) and History of Present Illness(es)     Diabetic Eye Exam     Associated symptoms: blurred vision    Diabetes Type: Type 2              Comments     Patient here for diabetic eye exam. Patient has some bruising under and around left eye, results of a \"violent sleep episode\". This occurred a little over 1 week ago. 4 stiches on left eyebrow. Initially felt pressure feeling but has since gone away. On and off vision changes, wondering if it was because of temporary increase of Lyrica. Has been on normal Lyrica dose for about 6 weeks now.    Lab Results       Component                Value               Date                       A1C                      6.0                 01/24/2020                 A1C                      5.9                 09/13/2019                 A1C                      6.1                 03/19/2019                 A1C                      6.7                 12/24/2018                 A1C                      5.8                 05/01/2018                            Dianne Velasquez, COA    "

## 2020-02-03 NOTE — TELEPHONE ENCOUNTER
"Requested Prescriptions   Pending Prescriptions Disp Refills     metFORMIN (GLUCOPHAGE-XR) 500 MG 24 hr tablet 180 tablet 3     Sig: Take 2 tablets (1,000 mg) by mouth daily (with dinner)       Biguanide Agents Passed - 2/3/2020 10:11 AM        Passed - Blood pressure less than 140/90 in past 6 months     BP Readings from Last 3 Encounters:   01/24/20 126/85   01/10/20 (!) 131/90   01/02/20 133/88                 Passed - Patient has documented LDL within the past 12 mos.     Recent Labs   Lab Test 09/13/19  0943   LDL Cannot estimate LDL when triglyceride exceeds 400 mg/dL  64             Passed - Patient has had a Microalbumin in the past 15 mos.     Recent Labs   Lab Test 01/24/20  1646   MICROL 12   UMALCR 21.38*             Passed - Patient is age 10 or older        Passed - Patient has documented A1c within the specified period of time.     If HgbA1C is 8 or greater, it needs to be on file within the past 3 months.  If less than 8, must be on file within the past 6 months.     Recent Labs   Lab Test 01/24/20  1637   A1C 6.0*             Passed - Patient's CR is NOT>1.4 OR Patient's EGFR is NOT<45 within past 12 mos.     Recent Labs   Lab Test 01/24/20  1637   GFRESTIMATED >90   GFRESTBLACK >90       Recent Labs   Lab Test 01/24/20  1637   CR 0.81             Passed - Patient does NOT have a diagnosis of CHF.        Passed - Medication is active on med list        Passed - Recent (6 mo) or future (30 days) visit within the authorizing provider's specialty     Patient had office visit in the last 6 months or has a visit in the next 30 days with authorizing provider or within the authorizing provider's specialty.  See \"Patient Info\" tab in inbasket, or \"Choose Columns\" in Meds & Orders section of the refill encounter.            metFORMIN (GLUCOPHAGE-XR) 500 MG 24 hr tablet  Last Written Prescription Date:  1/23/19  Last Fill Quantity: 180,  # refills: 3   Last office visit: 1/24/2020 with prescribing provider:  " Dr. Lyles   Future Office Visit:   Next 5 appointments (look out 90 days)    Feb 03, 2020  2:00 PM CST  Return Visit with Ramakrishna Hernandez MD  [facility] ([facility]) [address] [place] [phone]  [phone]   Apr 22, 2020  2:40 PM CDT  Return Visit with Oneil Baxter MD  [facility] ([facility]) [address] [place] [phone]  [phone]

## 2020-02-06 ENCOUNTER — OFFICE VISIT (OUTPATIENT)
Dept: PODIATRY | Facility: CLINIC | Age: 45
End: 2020-02-06
Payer: MEDICARE

## 2020-02-06 VITALS
SYSTOLIC BLOOD PRESSURE: 126 MMHG | DIASTOLIC BLOOD PRESSURE: 80 MMHG | WEIGHT: 315 LBS | BODY MASS INDEX: 38.18 KG/M2 | HEART RATE: 82 BPM

## 2020-02-06 DIAGNOSIS — G62.9 PERIPHERAL POLYNEUROPATHY: ICD-10-CM

## 2020-02-06 DIAGNOSIS — E11.8 TYPE 2 DIABETES MELLITUS WITH COMPLICATION, WITHOUT LONG-TERM CURRENT USE OF INSULIN (H): ICD-10-CM

## 2020-02-06 DIAGNOSIS — L60.0 INGROWN TOENAIL: Primary | ICD-10-CM

## 2020-02-06 PROCEDURE — 11730 AVULSION NAIL PLATE SIMPLE 1: CPT | Mod: TA | Performed by: PODIATRIST

## 2020-02-06 PROCEDURE — 99214 OFFICE O/P EST MOD 30 MIN: CPT | Mod: 25 | Performed by: PODIATRIST

## 2020-02-06 RX ORDER — METFORMIN HCL 500 MG
1000 TABLET, EXTENDED RELEASE 24 HR ORAL
Qty: 180 TABLET | Refills: 1 | Status: SHIPPED | OUTPATIENT
Start: 2020-02-06 | End: 2020-06-28

## 2020-02-06 NOTE — TELEPHONE ENCOUNTER
Prescription approved per [facility] Refill Protocol.      Martell Chaves RN, BSN, PHN

## 2020-02-06 NOTE — LETTER
2/6/2020         RE: Joel Pineda  [address] [facility] [place] [address]        Dear Colleague,    Thank you for referring your patient, [facility]. Please see a copy of my visit note below.    Subjective:    Pt is seen today w/ the c/c of a painful ingrown left great nail medial border.  This has been problematic for 1 week(s).  negativehistory of drainage from the site. This is slowly getting worse.  Aggravated by activity and relieved by rest.  Patient has history of diabetes with peripheral neuropathy.  He has a history of pulmonary embolism.  He had a ingrown nail on his right hallux in October 2019 and had a temporary removal which worked very well.  Denies fever and chill, denies numbness and tingling, denies erythema on dorsum of foot.  Patient's mother had diabetes.    ROS: See above    Past Medical History:   Diagnosis Date     Acne      Allergic state      Anxiety      Bipolar 2 disorder (H)      Chest pain     Chest pain, regulated w/BP meds. Clear arteries.     Chronic pain      Depressive disorder      Diabetes (H)      Diverticulosis      Gastroesophageal reflux disease      Hypertension      IBS (irritable bowel syndrome)      Intracranial arachnoid cyst      Polyneuropathy      Pulmonary embolism (H)      Skin exam, screening for cancer 12/3/2013     Sleep apnea      Uncomplicated asthma        Past Surgical History:   Procedure Laterality Date     BACK SURGERY  10/07    lumbar discectomy L5-S1     COLONOSCOPY      Note: colonoscopy scheduled with [facility] on Friday, 9/4/15     COSMETIC SURGERY  2012    Nose Exterior - functional     GI SURGERY  August 2013    Sigmoidectomy     HERNIA REPAIR, UMBILICAL  8/23/11    Dr. Evan whiting     INCISION AND DRAINAGE, ABSCESS, COMPLEX  8/23/11    umbilical, Dr. Evan Beavers     LAPAROSCOPIC ASSISTED COLECTOMY LEFT (DESCENDING)  8/15/2013    Procedure: LAPAROSCOPIC ASSISTED COLECTOMY LEFT (DESCENDING);   "Laparoscopic Hand Assisted Sigmoid Resection, Mobilization of Splenic Fissure, coloproctoscopy, *Latex Free Room* Anesthesia General with Pain block  ;  Surgeon: Aurora Justice MD;  Location: [facility] OR     NERVE SURGERY  8/18/11    RF ablation @ L3-S1 @ [facility]     RECONSTRUCT NOSE AND SEPTUM (FUNCTIONAL)  10/14/2011    Procedure:RECONSTRUCT NOSE AND SEPTUM (FUNCTIONAL); Functional Septorhinoplasty, Turbinate Reduction, ; Surgeon:CEDRIC CUEVAS; Location:[facility] OR     SINUS SURGERY  10/1/01    ethmoidectomy chronic sinusitis       Family History   Problem Relation Age of Onset     Musculoskeletal Disorder Mother         back     Anxiety Disorder Mother      Colon Polyps Mother      Ulcerative Colitis Mother         and ischemic small intestine, surgery     Hypertension Mother      Breast Cancer Mother      Osteoporosis Mother      Diabetes Mother         Type 2, Diagnosed in 2014     Depression Mother         Takes Cymbalta to help with chronic pain + depx     Thyroid Disease Mother         Hypothyroidism     Obesity Mother         Under much better control latter half of 2015     Musculoskeletal Disorder Father         back     Substance Abuse Father      Hypertension Father      Hyperlipidemia Father      Depression Father         Off meds for many years. Seems \"ok\"     Heart Disease Maternal Grandmother      Heart Disease Maternal Grandfather      Psychotic Disorder Paternal Grandfather      Suicide Paternal Grandfather      Depression Paternal Grandfather         Pediatrician. Committed suicide by pistol in 1990.     Musculoskeletal Disorder Brother         back     Depression Brother         Expressed as anger and moodiness     Substance Abuse Sister      Depression Sister         Mental Health Therapist, yet no anti-depressants?     Anxiety Disorder Sister         Mental Health Therapist, yet no anti-anxiety meds?     Other Cancer Other         Bladder Cancer - Fatal     Substance Abuse Brother      Colon Cancer No " family hx of      Crohn's Disease No family hx of      Anesthesia Reaction No family hx of      Cancer No family hx of         No family history of skin cancer       Social History     Tobacco Use     Smoking status: Never Smoker     Smokeless tobacco: Never Used   Substance Use Topics     Alcohol use: Yes     Alcohol/week: 0.0 standard drinks     Drinks per session: 1 or 2     Binge frequency: Weekly     Comment: occ 1/week         Current Outpatient Medications:      acetaminophen 500 MG CAPS, Take 500 mg by mouth every 4 hours as needed, Disp: 60 capsule, Rfl:      albuterol (PROAIR HFA/PROVENTIL HFA/VENTOLIN HFA) 108 (90 Base) MCG/ACT inhaler, Inhale 2 puffs into the lungs every 4 hours as needed for shortness of breath / dyspnea or wheezing, Disp: 8.5 g, Rfl: 11     ALPRAZolam (XANAX XR) 0.5 MG 24 hr tablet, Take 0.5 mg by mouth 3 times daily as needed , Disp: , Rfl:      aspirin (ASA) 81 MG tablet, Take 81 mg by mouth daily , Disp: , Rfl:      celecoxib (CELEBREX) 200 MG capsule, TAKE 1 CAPSULE 2 TIMES     DAILY AS NEEDED MODERATE   PAIN, Disp: 180 capsule, Rfl: 1     cetirizine (ZYRTEC) 10 MG tablet, Take 1 tablet (10 mg) by mouth At Bedtime, Disp: 30 tablet, Rfl: 11     cholecalciferol (VITAMIN D3) 57010 units (1250 mcg) capsule, Take one capsule every two weeks., Disp: 24 capsule, Rfl: 1     cyanocobalamin (CYANOCOBALAMIN) 1000 MCG/ML injection, Inject 1 mL (1,000 mcg) into the muscle every 30 days, Disp: 10 mL, Rfl: 0     DULoxetine (CYMBALTA) 60 MG capsule, Take 60 mg by mouth 2 times daily , Disp: , Rfl:      EPINEPHrine (EPIPEN/ADRENACLICK/OR ANY BX GENERIC EQUIV) 0.3 MG/0.3ML injection 2-pack, Inject 0.3 mLs (0.3 mg) into the muscle once as needed for anaphylaxis, Disp: 0.6 mL, Rfl: 3     etanercept (ENBREL SURECLICK) 50 MG/ML autoinjector, Inject 50 mg Subcutaneous once a week Hold for signs of infection, and seek medical attention., Disp: 4 mL, Rfl: 5     famotidine (PEPCID) 20 MG tablet, Prior to  administration of Humira every 2 weeks, Disp: , Rfl:      fluticasone-salmeterol (ADVAIR) 500-50 MCG/DOSE inhaler, Inhale 1 puff into the lungs every 12 hours, Disp: 3 Inhaler, Rfl: 3     Ginger, Zingiber officinalis, (GINGER ROOT) 550 MG CAPS capsule, Take 550 mg by mouth Up to three times daily, Disp: , Rfl:      hydrOXYzine (ATARAX) 50 MG tablet, Take 50 mg by mouth 2 times daily For anxiety and insomnia, Disp: , Rfl:      lamoTRIgine (LAMICTAL) 100 MG tablet, Take 200 mg by mouth daily, Disp: , Rfl:      levothyroxine (SYNTHROID/LEVOTHROID) 75 MCG tablet, TAKE 1 TABLET EVERY MORNING, Disp: 90 tablet, Rfl: 1     Liniments (SALONPAS EX), Externally apply 1 Application topically daily as needed, Disp: , Rfl:      loperamide (IMODIUM) 2 MG capsule, Take 2 mg by mouth 4 times daily as needed for diarrhea, Disp: , Rfl:      melatonin 3 MG tablet, Take 1 mg by mouth At Bedtime, Disp: , Rfl:      metFORMIN (GLUCOPHAGE-XR) 500 MG 24 hr tablet, Take 2 tablets (1,000 mg) by mouth daily (with dinner), Disp: 180 tablet, Rfl: 1     metoclopramide (REGLAN) 5 MG tablet, Take 1 tablet (5 mg) by mouth 3 times daily as needed (nausea), Disp: 20 tablet, Rfl: 3     metoprolol succinate ER (TOPROL-XL) 200 MG 24 hr tablet, Take 1 tablet (200 mg) by mouth 2 times daily, Disp: 180 tablet, Rfl: 1     montelukast (SINGULAIR) 10 MG tablet, Take 1 tablet (10 mg) by mouth every evening, Disp: 90 tablet, Rfl: 1     mupirocin (BACTROBAN) 2 % nasal ointment, Spray 1 g into both nostrils 2 times daily, Disp: , Rfl:      omega-3 acid ethyl esters (LOVAZA) 1 g capsule, TAKE 2 CAPSULES BY MOUTH TWICE DAILY., Disp: 360 capsule, Rfl: 3     omeprazole 20 MG tablet, Take 20 mg by mouth daily , Disp: , Rfl:      order for DME, Equipment being ordered: lumbosacral belt/brace, Disp: 1 Units, Rfl: 0     order for DME, Respironics REMSTAR 60 Series Auto CPAP 9-13 cm H2O, Wisp nasal mask w/a large cushion and a chinstrap, Disp: , Rfl:      oxyCODONE  (ROXICODONE) 5 MG tablet, Take 1-2 tablets (5-10 mg) by mouth every 6 hours as needed for pain (maximum 4 tablet(s) per day), Disp: 35 tablet, Rfl: 0     pregabalin (LYRICA) 150 MG capsule, Take 1 capsule (150 mg) by mouth 2 times daily, Disp: 60 capsule, Rfl: 5     QUEtiapine (SEROQUEL) 25 MG tablet, Take 25 mg by mouth 4 times daily as needed, Disp: , Rfl:      ramipril (ALTACE) 10 MG capsule, Take 1 capsule (10 mg) by mouth daily, Disp: 90 capsule, Rfl: 1     rizatriptan (MAXALT-MLT) 5 MG ODT, Take 1 tablet (5 mg) by mouth at onset of headache for migraine, Disp: 30 tablet, Rfl: 5     rosuvastatin (CRESTOR) 40 MG tablet, TAKE 1 TABLET DAILY, Disp: 90 tablet, Rfl: 1     syringe, disposable, (BD TUBERCULIN SYRINGE) 1 ML MISC, Equipment being ordered: 1 ml tuberculin syringes to be used for Vitamin B12 injections., Disp: 12 each, Rfl: 1     vitamin C (ASCORBIC ACID) 500 MG tablet, Take 500 mg by mouth daily, Disp: , Rfl:        Allergies   Allergen Reactions     Amoxicillin-Pot Clavulanate Difficulty breathing     Banana Shortness Of Breath     Pt reports organic Banana is okay.      Nitroglycerin Palpitations     Penicillins Anaphylaxis     Provigil [Modafinil] Shortness Of Breath     headache     Gadolinium Hives and Itching     Patient was premedicated for the contrast allergy. He did still have a reaction a few hours after injection. Hives and itching. Dr. Gomez told tech to inform pt he should only have contrast again in the future when premedicated and at a hospital. Not at an outpatient facility.      Ketoconazole      Topical cream caused swelling and itching     Dye [Contrast Dye] Other (See Comments) and Hives     Moderate flushing, CT contrast     Golimumab      Hives, bradycardia, face swelling     Neurontin [Gabapentin] Hives     Moderate hives     Nortriptyline Hives     Varicella Virus Vaccine Live      Rash     Flagyl [Metronidazole Hcl] Palpitations and Hives     Latex Rash     Metronidazole  Palpitations, Other (See Comments) and Rash     dizziness (versus ciprofloxacin taken at same time)     No Clinical Screening - See Comments Rash     Nitrile gloves       /80   Pulse 82   Wt 146.1 kg (322 lb)   BMI 38.18 kg/m   .      Constitutional/ general:  Pt is in no apparent distress, appears well-nourished.  Cooperative with history and physical exam.     Psych:  The patient answered questions appropriately.  Normal affect.  Seems to have reasonable expectations, in terms of treatment.     Eyes:  Visual scanning/ tracking without deficit.     Ears:  Response to auditory stimuli is normal.  No  hearing aid devices.  Auricles in proper alignment.     Lymphatic:  Popliteal lymph nodes not enlarged.     Lungs:  Non labored breathing, non labored speech. No cough.  No audible wheezing. Even, quiet breathing.       Vascular:  Pedal pulses are palpable bilaterally for both the DP and PT arteries.  CFT < 3 sec.  No ankle varicosities or edema.  Pedal hair growth noted.     Neuro:  Alert and oriented x 3. Coordinated gait.  Light touch sensation is intact to the L4, L5, S1 distributions. No obvious deficits.  No evidence of neurological-based weakness, spasticity, or contracture in the lower extremities.      Derm: Normal texture and turgor.  No ecchymosis, or cyanosis.  Hair growth noted.        Musculoskeletal:     Patient is ambulatory without an assistive device or brace.  No gross deformities.  Normal arch with weightbearing.  No forefoot or rear foot deformities noted.  MS 5/5 all compartments.  Normal ROM all fore foot and rearfoot joints.  No equinus.  left great toe nail medial border shows soft tissue impingement with localized erythema.   negative active drainage/purulence at this time.  No sinus tracts.  No nailbed masses or exostosis.  No pain with range of motion of IPJ or MTPJ.  No ascending cellulitis.    ASSESSMENT:    Onychocryptosis with paronychia left toe  Diabetes with peripheral  neuropathy.    Discussed etiology and treatment options in detail w/ the pt.  The potential causes and nature of an ingrown toenail were discussed with the patient.  We reviewed the natural history/prognosis of the condition and potential risks if no treatment is provided.      Treatment options discussed included conservative management (oral antibiotics, soaking of foot, adequate width shoes)  as well as surgical management (partial or total nail removal).  The pros and cons of both forms of treatment were reviewed.  Handout given to patient.      After thorough discussion and answering all questions, the patient elected to have nail avulsion.  Obtained consent, used 3cc of 1% lidocaine plain to block left first toe.  Sterile prep, then avulsed the affected border(s).  No evidence of deep abscess noted.  Pt tolerated procedure well.  Sterile bandage placed, gave wound care instruction.  We discussed permanent removal if this returns in the future.  Return to clinic prn.    Peng Perez DPM DPM, FACFAS      Again, thank you for allowing me to participate in the care of your patient.        Sincerely,        Peng Perez DPM

## 2020-02-06 NOTE — PROGRESS NOTES
Subjective:    Pt is seen today w/ the c/c of a painful ingrown left great nail medial border.  This has been problematic for 1 week(s).  negativehistory of drainage from the site. This is slowly getting worse.  Aggravated by activity and relieved by rest.  Patient has history of diabetes with peripheral neuropathy.  He has a history of pulmonary embolism.  He had a ingrown nail on his right hallux in October 2019 and had a temporary removal which worked very well.  Denies fever and chill, denies numbness and tingling, denies erythema on dorsum of foot.  Patient's mother had diabetes.    ROS: See above    Past Medical History:   Diagnosis Date     Acne      Allergic state      Anxiety      Bipolar 2 disorder (H)      Chest pain     Chest pain, regulated w/BP meds. Clear arteries.     Chronic pain      Depressive disorder      Diabetes (H)      Diverticulosis      Gastroesophageal reflux disease      Hypertension      IBS (irritable bowel syndrome)      Intracranial arachnoid cyst      Polyneuropathy      Pulmonary embolism (H)      Skin exam, screening for cancer 12/3/2013     Sleep apnea      Uncomplicated asthma        Past Surgical History:   Procedure Laterality Date     BACK SURGERY  10/07    lumbar discectomy L5-S1     COLONOSCOPY      Note: colonoscopy scheduled with [facility] on Friday, 9/4/15     COSMETIC SURGERY  2012    Nose Exterior - functional     GI SURGERY  August 2013    Sigmoidectomy     HERNIA REPAIR, UMBILICAL  8/23/11    Dr. Evan whiting     INCISION AND DRAINAGE, ABSCESS, COMPLEX  8/23/11    umbilical, Dr. Evan Beavers     LAPAROSCOPIC ASSISTED COLECTOMY LEFT (DESCENDING)  8/15/2013    Procedure: LAPAROSCOPIC ASSISTED COLECTOMY LEFT (DESCENDING);  Laparoscopic Hand Assisted Sigmoid Resection, Mobilization of Splenic Fissure, coloproctoscopy, *Latex Free Room* Anesthesia General with Pain block  ;  Surgeon: Aurora Justice MD;  Location: [facility] OR     NERVE SURGERY  8/18/11    RF ablation @  "L3-S1 @ [facility]     RECONSTRUCT NOSE AND SEPTUM (FUNCTIONAL)  10/14/2011    Procedure:RECONSTRUCT NOSE AND SEPTUM (FUNCTIONAL); Functional Septorhinoplasty, Turbinate Reduction, ; Surgeon:CEDRIC CUEVAS; Location:[facility] OR     SINUS SURGERY  10/1/01    ethmoidectomy chronic sinusitis       Family History   Problem Relation Age of Onset     Musculoskeletal Disorder Mother         back     Anxiety Disorder Mother      Colon Polyps Mother      Ulcerative Colitis Mother         and ischemic small intestine, surgery     Hypertension Mother      Breast Cancer Mother      Osteoporosis Mother      Diabetes Mother         Type 2, Diagnosed in 2014     Depression Mother         Takes Cymbalta to help with chronic pain + depx     Thyroid Disease Mother         Hypothyroidism     Obesity Mother         Under much better control latter half of 2015     Musculoskeletal Disorder Father         back     Substance Abuse Father      Hypertension Father      Hyperlipidemia Father      Depression Father         Off meds for many years. Seems \"ok\"     Heart Disease Maternal Grandmother      Heart Disease Maternal Grandfather      Psychotic Disorder Paternal Grandfather      Suicide Paternal Grandfather      Depression Paternal Grandfather         Pediatrician. Committed suicide by pistol in 1990.     Musculoskeletal Disorder Brother         back     Depression Brother         Expressed as anger and moodiness     Substance Abuse Sister      Depression Sister         Mental Health Therapist, yet no anti-depressants?     Anxiety Disorder Sister         Mental Health Therapist, yet no anti-anxiety meds?     Other Cancer Other         Bladder Cancer - Fatal     Substance Abuse Brother      Colon Cancer No family hx of      Crohn's Disease No family hx of      Anesthesia Reaction No family hx of      Cancer No family hx of         No family history of skin cancer       Social History     Tobacco Use     Smoking status: Never Smoker     Smokeless " tobacco: Never Used   Substance Use Topics     Alcohol use: Yes     Alcohol/week: 0.0 standard drinks     Drinks per session: 1 or 2     Binge frequency: Weekly     Comment: occ 1/week         Current Outpatient Medications:      acetaminophen 500 MG CAPS, Take 500 mg by mouth every 4 hours as needed, Disp: 60 capsule, Rfl:      albuterol (PROAIR HFA/PROVENTIL HFA/VENTOLIN HFA) 108 (90 Base) MCG/ACT inhaler, Inhale 2 puffs into the lungs every 4 hours as needed for shortness of breath / dyspnea or wheezing, Disp: 8.5 g, Rfl: 11     ALPRAZolam (XANAX XR) 0.5 MG 24 hr tablet, Take 0.5 mg by mouth 3 times daily as needed , Disp: , Rfl:      aspirin (ASA) 81 MG tablet, Take 81 mg by mouth daily , Disp: , Rfl:      celecoxib (CELEBREX) 200 MG capsule, TAKE 1 CAPSULE 2 TIMES     DAILY AS NEEDED MODERATE   PAIN, Disp: 180 capsule, Rfl: 1     cetirizine (ZYRTEC) 10 MG tablet, Take 1 tablet (10 mg) by mouth At Bedtime, Disp: 30 tablet, Rfl: 11     cholecalciferol (VITAMIN D3) 02336 units (1250 mcg) capsule, Take one capsule every two weeks., Disp: 24 capsule, Rfl: 1     cyanocobalamin (CYANOCOBALAMIN) 1000 MCG/ML injection, Inject 1 mL (1,000 mcg) into the muscle every 30 days, Disp: 10 mL, Rfl: 0     DULoxetine (CYMBALTA) 60 MG capsule, Take 60 mg by mouth 2 times daily , Disp: , Rfl:      EPINEPHrine (EPIPEN/ADRENACLICK/OR ANY BX GENERIC EQUIV) 0.3 MG/0.3ML injection 2-pack, Inject 0.3 mLs (0.3 mg) into the muscle once as needed for anaphylaxis, Disp: 0.6 mL, Rfl: 3     etanercept (ENBREL SURECLICK) 50 MG/ML autoinjector, Inject 50 mg Subcutaneous once a week Hold for signs of infection, and seek medical attention., Disp: 4 mL, Rfl: 5     famotidine (PEPCID) 20 MG tablet, Prior to administration of Humira every 2 weeks, Disp: , Rfl:      fluticasone-salmeterol (ADVAIR) 500-50 MCG/DOSE inhaler, Inhale 1 puff into the lungs every 12 hours, Disp: 3 Inhaler, Rfl: 3     Ginger, Zingiber officinalis, (GINGER ROOT) 550 MG CAPS  capsule, Take 550 mg by mouth Up to three times daily, Disp: , Rfl:      hydrOXYzine (ATARAX) 50 MG tablet, Take 50 mg by mouth 2 times daily For anxiety and insomnia, Disp: , Rfl:      lamoTRIgine (LAMICTAL) 100 MG tablet, Take 200 mg by mouth daily, Disp: , Rfl:      levothyroxine (SYNTHROID/LEVOTHROID) 75 MCG tablet, TAKE 1 TABLET EVERY MORNING, Disp: 90 tablet, Rfl: 1     Liniments (SALONPAS EX), Externally apply 1 Application topically daily as needed, Disp: , Rfl:      loperamide (IMODIUM) 2 MG capsule, Take 2 mg by mouth 4 times daily as needed for diarrhea, Disp: , Rfl:      melatonin 3 MG tablet, Take 1 mg by mouth At Bedtime, Disp: , Rfl:      metFORMIN (GLUCOPHAGE-XR) 500 MG 24 hr tablet, Take 2 tablets (1,000 mg) by mouth daily (with dinner), Disp: 180 tablet, Rfl: 1     metoclopramide (REGLAN) 5 MG tablet, Take 1 tablet (5 mg) by mouth 3 times daily as needed (nausea), Disp: 20 tablet, Rfl: 3     metoprolol succinate ER (TOPROL-XL) 200 MG 24 hr tablet, Take 1 tablet (200 mg) by mouth 2 times daily, Disp: 180 tablet, Rfl: 1     montelukast (SINGULAIR) 10 MG tablet, Take 1 tablet (10 mg) by mouth every evening, Disp: 90 tablet, Rfl: 1     mupirocin (BACTROBAN) 2 % nasal ointment, Spray 1 g into both nostrils 2 times daily, Disp: , Rfl:      omega-3 acid ethyl esters (LOVAZA) 1 g capsule, TAKE 2 CAPSULES BY MOUTH TWICE DAILY., Disp: 360 capsule, Rfl: 3     omeprazole 20 MG tablet, Take 20 mg by mouth daily , Disp: , Rfl:      order for DME, Equipment being ordered: lumbosacral belt/brace, Disp: 1 Units, Rfl: 0     order for DME, Respironics REMSTAR 60 Series Auto CPAP 9-13 cm H2O, Wisp nasal mask w/a large cushion and a chinstrap, Disp: , Rfl:      oxyCODONE (ROXICODONE) 5 MG tablet, Take 1-2 tablets (5-10 mg) by mouth every 6 hours as needed for pain (maximum 4 tablet(s) per day), Disp: 35 tablet, Rfl: 0     pregabalin (LYRICA) 150 MG capsule, Take 1 capsule (150 mg) by mouth 2 times daily, Disp: 60  capsule, Rfl: 5     QUEtiapine (SEROQUEL) 25 MG tablet, Take 25 mg by mouth 4 times daily as needed, Disp: , Rfl:      ramipril (ALTACE) 10 MG capsule, Take 1 capsule (10 mg) by mouth daily, Disp: 90 capsule, Rfl: 1     rizatriptan (MAXALT-MLT) 5 MG ODT, Take 1 tablet (5 mg) by mouth at onset of headache for migraine, Disp: 30 tablet, Rfl: 5     rosuvastatin (CRESTOR) 40 MG tablet, TAKE 1 TABLET DAILY, Disp: 90 tablet, Rfl: 1     syringe, disposable, (BD TUBERCULIN SYRINGE) 1 ML MISC, Equipment being ordered: 1 ml tuberculin syringes to be used for Vitamin B12 injections., Disp: 12 each, Rfl: 1     vitamin C (ASCORBIC ACID) 500 MG tablet, Take 500 mg by mouth daily, Disp: , Rfl:        Allergies   Allergen Reactions     Amoxicillin-Pot Clavulanate Difficulty breathing     Banana Shortness Of Breath     Pt reports organic Banana is okay.      Nitroglycerin Palpitations     Penicillins Anaphylaxis     Provigil [Modafinil] Shortness Of Breath     headache     Gadolinium Hives and Itching     Patient was premedicated for the contrast allergy. He did still have a reaction a few hours after injection. Hives and itching. Dr. Gomez told tech to inform pt he should only have contrast again in the future when premedicated and at a hospital. Not at an outpatient facility.      Ketoconazole      Topical cream caused swelling and itching     Dye [Contrast Dye] Other (See Comments) and Hives     Moderate flushing, CT contrast     Golimumab      Hives, bradycardia, face swelling     Neurontin [Gabapentin] Hives     Moderate hives     Nortriptyline Hives     Varicella Virus Vaccine Live      Rash     Flagyl [Metronidazole Hcl] Palpitations and Hives     Latex Rash     Metronidazole Palpitations, Other (See Comments) and Rash     dizziness (versus ciprofloxacin taken at same time)     No Clinical Screening - See Comments Rash     Nitrile gloves       /80   Pulse 82   Wt 146.1 kg (322 lb)   BMI 38.18 kg/m  .       Constitutional/ general:  Pt is in no apparent distress, appears well-nourished.  Cooperative with history and physical exam.     Psych:  The patient answered questions appropriately.  Normal affect.  Seems to have reasonable expectations, in terms of treatment.     Eyes:  Visual scanning/ tracking without deficit.     Ears:  Response to auditory stimuli is normal.  No  hearing aid devices.  Auricles in proper alignment.     Lymphatic:  Popliteal lymph nodes not enlarged.     Lungs:  Non labored breathing, non labored speech. No cough.  No audible wheezing. Even, quiet breathing.       Vascular:  Pedal pulses are palpable bilaterally for both the DP and PT arteries.  CFT < 3 sec.  No ankle varicosities or edema.  Pedal hair growth noted.     Neuro:  Alert and oriented x 3. Coordinated gait.  Light touch sensation is intact to the L4, L5, S1 distributions. No obvious deficits.  No evidence of neurological-based weakness, spasticity, or contracture in the lower extremities.      Derm: Normal texture and turgor.  No ecchymosis, or cyanosis.  Hair growth noted.        Musculoskeletal:     Patient is ambulatory without an assistive device or brace.  No gross deformities.  Normal arch with weightbearing.  No forefoot or rear foot deformities noted.  MS 5/5 all compartments.  Normal ROM all fore foot and rearfoot joints.  No equinus.  left great toe nail medial border shows soft tissue impingement with localized erythema.   negative active drainage/purulence at this time.  No sinus tracts.  No nailbed masses or exostosis.  No pain with range of motion of IPJ or MTPJ.  No ascending cellulitis.    ASSESSMENT:    Onychocryptosis with paronychia left toe  Diabetes with peripheral neuropathy.    Discussed etiology and treatment options in detail w/ the pt.  The potential causes and nature of an ingrown toenail were discussed with the patient.  We reviewed the natural history/prognosis of the condition and potential risks if  no treatment is provided.      Treatment options discussed included conservative management (oral antibiotics, soaking of foot, adequate width shoes)  as well as surgical management (partial or total nail removal).  The pros and cons of both forms of treatment were reviewed.  Handout given to patient.      After thorough discussion and answering all questions, the patient elected to have nail avulsion.  Obtained consent, used 3cc of 1% lidocaine plain to block left first toe.  Sterile prep, then avulsed the affected border(s).  No evidence of deep abscess noted.  Pt tolerated procedure well.  Sterile bandage placed, gave wound care instruction.  We discussed permanent removal if this returns in the future.  Return to clinic prn.    Peng Perez, LIAN DPM, FACFAS

## 2020-02-06 NOTE — PATIENT INSTRUCTIONS
Weight management plan: Patient was referred to their PCP to discuss a diet and exercise plan.  We wish you continued good healing. If you have any questions or concerns, please do not hesitate to contact us at [phone]    Please remember to call and schedule a follow up appointment if one was recommended at your earliest convenience.   PODIATRY CLINIC HOURS  TELEPHONE NUMBER    Dr. Peng Perez D.P.[facility]    Clinics:  [facility]    [facility]   Tuesday 1PM-6PM  [place]  Wednesday 7AM-2PM  [facility]  Thursday 10AM-6PM  [place]  Friday 7AM-3PM  [place]  Specialty schedulers:   [phone] to make an appointment with any Specialty Provider.        Urgent Care locations:    [facility] Monday-Friday 5 pm - 9 pm. Saturday-Sunday 9 am -5pm    Monday-Friday 11 am - 9 pm Saturday 9 am - 5 pm     Monday-Sunday 12 noon-8PM [phone]     If you need a medication refill, please contact us you may need lab work and/or a follow up visit prior to your refill (i.e. Antifungal medications).    [facility] (secure e-mail communication and access to your chart) to send a message or to make an appointment.    If MRI needed please call [place] [facility] at [phone]        Tito to follow up with Primary Care provider regarding elevated blood pressure.

## 2020-02-10 ENCOUNTER — HEALTH MAINTENANCE LETTER (OUTPATIENT)
Age: 45
End: 2020-02-10

## 2020-02-15 ENCOUNTER — TRANSFERRED RECORDS (OUTPATIENT)
Dept: HEALTH INFORMATION MANAGEMENT | Facility: CLINIC | Age: 45
End: 2020-02-15

## 2020-02-17 ENCOUNTER — OFFICE VISIT (OUTPATIENT)
Dept: FAMILY MEDICINE | Facility: CLINIC | Age: 45
End: 2020-02-17
Payer: MEDICARE

## 2020-02-17 VITALS
TEMPERATURE: 97.8 F | HEART RATE: 71 BPM | RESPIRATION RATE: 19 BRPM | SYSTOLIC BLOOD PRESSURE: 127 MMHG | WEIGHT: 315 LBS | HEIGHT: 77 IN | OXYGEN SATURATION: 98 % | BODY MASS INDEX: 37.19 KG/M2 | DIASTOLIC BLOOD PRESSURE: 67 MMHG

## 2020-02-17 DIAGNOSIS — E11.8 TYPE 2 DIABETES MELLITUS WITH COMPLICATION, WITHOUT LONG-TERM CURRENT USE OF INSULIN (H): ICD-10-CM

## 2020-02-17 DIAGNOSIS — R11.0 NAUSEA: ICD-10-CM

## 2020-02-17 DIAGNOSIS — K31.84 GASTROPARESIS: Primary | ICD-10-CM

## 2020-02-17 DIAGNOSIS — G90.1 DYSAUTONOMIA (H): Primary | ICD-10-CM

## 2020-02-17 PROCEDURE — 99214 OFFICE O/P EST MOD 30 MIN: CPT | Performed by: NURSE PRACTITIONER

## 2020-02-17 RX ORDER — METOCLOPRAMIDE 5 MG/1
5 TABLET ORAL 3 TIMES DAILY PRN
Qty: 90 TABLET | Refills: 1 | Status: SHIPPED | OUTPATIENT
Start: 2020-02-17 | End: 2020-04-05

## 2020-02-17 ASSESSMENT — ANXIETY QUESTIONNAIRES
2. NOT BEING ABLE TO STOP OR CONTROL WORRYING: MORE THAN HALF THE DAYS
7. FEELING AFRAID AS IF SOMETHING AWFUL MIGHT HAPPEN: MORE THAN HALF THE DAYS
3. WORRYING TOO MUCH ABOUT DIFFERENT THINGS: MORE THAN HALF THE DAYS
5. BEING SO RESTLESS THAT IT IS HARD TO SIT STILL: SEVERAL DAYS
1. FEELING NERVOUS, ANXIOUS, OR ON EDGE: MORE THAN HALF THE DAYS
GAD7 TOTAL SCORE: 13
6. BECOMING EASILY ANNOYED OR IRRITABLE: MORE THAN HALF THE DAYS
IF YOU CHECKED OFF ANY PROBLEMS ON THIS QUESTIONNAIRE, HOW DIFFICULT HAVE THESE PROBLEMS MADE IT FOR YOU TO DO YOUR WORK, TAKE CARE OF THINGS AT HOME, OR GET ALONG WITH OTHER PEOPLE: VERY DIFFICULT

## 2020-02-17 ASSESSMENT — MIFFLIN-ST. JEOR: SCORE: 2462.5

## 2020-02-17 ASSESSMENT — PATIENT HEALTH QUESTIONNAIRE - PHQ9
5. POOR APPETITE OR OVEREATING: MORE THAN HALF THE DAYS
SUM OF ALL RESPONSES TO PHQ QUESTIONS 1-9: 17

## 2020-02-17 ASSESSMENT — PAIN SCALES - GENERAL: PAINLEVEL: NO PAIN (1)

## 2020-02-17 NOTE — TELEPHONE ENCOUNTER
Bennett De Los Santos,    I ordered th autonomic testing.    Please have him come and see me 3 weeks after testing

## 2020-02-17 NOTE — PROGRESS NOTES
Subjective     Joel Pineda is a 44 year old male who presents to clinic today for the following health issues:    HPI   ED/UC Followup:    Facility:  [facility]  Date of visit: 2/15/2020  Reason for visit: abdominal pain  Current Status: still having abdominal pain       Recent dx gastroparesis: gastric contents sit in there 30-40% 4 hours later. Had big dinner last week with parents for Cortez's day. Increase reglan to 10 mg, very uncomfortable for days. Saturday couldn't even drink liquids. To the ER, also having chest pains. Scans were normal.  He thought he had appendacitis, given IV reglan 10 mg helped. Now drinking ensure. Being more careful about what he is eating and how fast. Is going to work with a dietitian also-insurance only pays for this once a year. Stocked up on canned pears/peaches to keep bowels moving as they are a little slow. Avoiding fibrous stuff as it worsens his symptoms.     metformin taking 1 BID  10 mg reglan makes him tired, the 5 mg he can function.   Considering PT few more weeks  Headaches on-going. Has cervical pillow, using less maxalt. Normally effective. Glasses script updated.     Needs to see nutritionist before going back to [facility]. Insurance doesn't cover [facility] they are like 200-300$/visit. He isn't sure about going back due to cost.     He didn't like the tramadol much. Feels the oxycodone worked better and made him less 'fuzzy' mentally.       Patient Active Problem List   Diagnosis     Major depressive disorder, recurrent episode (H)     Intermittent asthma     Hyperlipidemia LDL goal <100     Chronic nonallergic rhinitis     Diverticulosis     GERD (gastroesophageal reflux disease)     Anxiety     LLOYD (obstructive sleep apnea)- mild (AHI 11)     Intracranial arachnoid cyst     Facet arthritis of cervical region     Acquired hypothyroidism     Bipolar 2 disorder (H)     Chronic midline low back pain without sciatica     Irritable bowel syndrome with diarrhea      B12 deficiency     Essential hypertension with goal blood pressure less than 140/90     Chronic pain syndrome     Ankylosing spondylitis of sacral region (H)     Morbid obesity due to hypertriglyceridemia (H)     Fatty infiltration of liver     DDD (degenerative disc disease), lumbar     Type 2 diabetes mellitus with complication, without long-term current use of insulin (H)     Peripheral polyneuropathy     History of pulmonary embolism     Ingrown toenail     Lipoma of skin and subcutaneous tissue     Hypertriglyceridemia     Gastroparesis     Past Surgical History:   Procedure Laterality Date     BACK SURGERY  10/07    lumbar discectomy L5-S1     COLONOSCOPY      Note: colonoscopy scheduled with [facility] on Friday, 9/4/15     COSMETIC SURGERY  2012    Nose Exterior - functional     GI SURGERY  August 2013    Sigmoidectomy     HERNIA REPAIR, UMBILICAL  8/23/11    [place], Dr. Evan Beavers     INCISION AND DRAINAGE, ABSCESS, COMPLEX  8/23/11    umbilical, Dr. Evan Beavers     LAPAROSCOPIC ASSISTED COLECTOMY LEFT (DESCENDING)  8/15/2013    Procedure: LAPAROSCOPIC ASSISTED COLECTOMY LEFT (DESCENDING);  Laparoscopic Hand Assisted Sigmoid Resection, Mobilization of Splenic Fissure, coloproctoscopy, *Latex Free Room* Anesthesia General with Pain block  ;  Surgeon: Aurora Justice MD;  Location: [facility] OR     NERVE SURGERY  8/18/11    RF ablation @ L3-S1 @ [facility]     RECONSTRUCT NOSE AND SEPTUM (FUNCTIONAL)  10/14/2011    Procedure:RECONSTRUCT NOSE AND SEPTUM (FUNCTIONAL); Functional Septorhinoplasty, Turbinate Reduction, ; Surgeon:CEDRIC CUEVAS; Location:[facility] OR     SINUS SURGERY  10/1/01    ethmoidectomy chronic sinusitis       Social History     Tobacco Use     Smoking status: Never Smoker     Smokeless tobacco: Never Used   Substance Use Topics     Alcohol use: Yes     Alcohol/week: 0.0 standard drinks     Drinks per session: 1 or 2     Binge frequency: Weekly     Comment: occ 1/week     Family History   Problem Relation Age  "of Onset     Musculoskeletal Disorder Mother         back     Anxiety Disorder Mother      Colon Polyps Mother      Ulcerative Colitis Mother         and ischemic small intestine, surgery     Hypertension Mother      Breast Cancer Mother      Osteoporosis Mother      Diabetes Mother         Type 2, Diagnosed in 2014     Depression Mother         Takes Cymbalta to help with chronic pain + depx     Thyroid Disease Mother         Hypothyroidism     Obesity Mother         Under much better control latter half of 2015     Musculoskeletal Disorder Father         back     Substance Abuse Father      Hypertension Father      Hyperlipidemia Father      Depression Father         Off meds for many years. Seems \"ok\"     Heart Disease Maternal Grandmother      Heart Disease Maternal Grandfather      Psychotic Disorder Paternal Grandfather      Suicide Paternal Grandfather      Depression Paternal Grandfather         Pediatrician. Committed suicide by pistol in 1990.     Musculoskeletal Disorder Brother         back     Depression Brother         Expressed as anger and moodiness     Substance Abuse Sister      Depression Sister         Mental Health Therapist, yet no anti-depressants?     Anxiety Disorder Sister         Mental Health Therapist, yet no anti-anxiety meds?     Other Cancer Other         Bladder Cancer - Fatal     Substance Abuse Brother      Colon Cancer No family hx of      Crohn's Disease No family hx of      Anesthesia Reaction No family hx of      Cancer No family hx of         No family history of skin cancer         Current Outpatient Medications   Medication Sig Dispense Refill     acetaminophen 500 MG CAPS Take 500 mg by mouth every 4 hours as needed 60 capsule      albuterol (PROAIR HFA/PROVENTIL HFA/VENTOLIN HFA) 108 (90 Base) MCG/ACT inhaler Inhale 2 puffs into the lungs every 4 hours as needed for shortness of breath / dyspnea or wheezing 8.5 g 11     ALPRAZolam (XANAX XR) 0.5 MG 24 hr tablet Take 0.5 mg " by mouth 3 times daily as needed        aspirin (ASA) 81 MG tablet Take 81 mg by mouth daily        celecoxib (CELEBREX) 200 MG capsule TAKE 1 CAPSULE 2 TIMES     DAILY AS NEEDED MODERATE   PAIN 180 capsule 1     cetirizine (ZYRTEC) 10 MG tablet Take 1 tablet (10 mg) by mouth At Bedtime 30 tablet 11     cholecalciferol (VITAMIN D3) 07012 units (1250 mcg) capsule Take one capsule every two weeks. 24 capsule 1     cyanocobalamin (CYANOCOBALAMIN) 1000 MCG/ML injection Inject 1 mL (1,000 mcg) into the muscle every 30 days 10 mL 0     DULoxetine (CYMBALTA) 60 MG capsule Take 60 mg by mouth 2 times daily        EPINEPHrine (EPIPEN/ADRENACLICK/OR ANY BX GENERIC EQUIV) 0.3 MG/0.3ML injection 2-pack Inject 0.3 mLs (0.3 mg) into the muscle once as needed for anaphylaxis 0.6 mL 3     etanercept (ENBREL SURECLICK) 50 MG/ML autoinjector Inject 50 mg Subcutaneous once a week Hold for signs of infection, and seek medical attention. 4 mL 5     famotidine (PEPCID) 20 MG tablet Prior to administration of Humira every 2 weeks       fluticasone-salmeterol (ADVAIR) 500-50 MCG/DOSE inhaler Inhale 1 puff into the lungs every 12 hours 3 Inhaler 3     Ginger, Zingiber officinalis, (GINGER ROOT) 550 MG CAPS capsule Take 550 mg by mouth Up to three times daily       hydrOXYzine (ATARAX) 50 MG tablet Take 50 mg by mouth 2 times daily For anxiety and insomnia       lamoTRIgine (LAMICTAL) 100 MG tablet Take 200 mg by mouth daily       levothyroxine (SYNTHROID/LEVOTHROID) 75 MCG tablet TAKE 1 TABLET EVERY MORNING 90 tablet 1     Liniments (SALONPAS EX) Externally apply 1 Application topically daily as needed       loperamide (IMODIUM) 2 MG capsule Take 2 mg by mouth 4 times daily as needed for diarrhea       melatonin 3 MG tablet Take 1 mg by mouth At Bedtime       metFORMIN (GLUCOPHAGE-XR) 500 MG 24 hr tablet Take 2 tablets (1,000 mg) by mouth daily (with dinner) 180 tablet 1     metoclopramide (REGLAN) 5 MG tablet Take 1 tablet (5 mg) by mouth  3 times daily as needed (nausea) 90 tablet 1     metoprolol succinate ER (TOPROL-XL) 200 MG 24 hr tablet Take 1 tablet (200 mg) by mouth 2 times daily 180 tablet 1     montelukast (SINGULAIR) 10 MG tablet Take 1 tablet (10 mg) by mouth every evening 90 tablet 1     omega-3 acid ethyl esters (LOVAZA) 1 g capsule TAKE 2 CAPSULES BY MOUTH TWICE DAILY. 360 capsule 3     omeprazole 20 MG tablet Take 20 mg by mouth daily        order for DME Equipment being ordered: lumbosacral belt/brace 1 Units 0     order for DME Respironics REMSTAR 60 Series Auto CPAP 9-13 cm H2O, Wisp nasal mask w/a large cushion and a chinstrap       oxyCODONE (ROXICODONE) 5 MG tablet Take 1-2 tablets (5-10 mg) by mouth every 6 hours as needed for pain (maximum 4 tablet(s) per day) 35 tablet 0     pregabalin (LYRICA) 150 MG capsule Take 1 capsule (150 mg) by mouth 2 times daily 60 capsule 5     QUEtiapine (SEROQUEL) 25 MG tablet Take 25 mg by mouth 4 times daily as needed       ramipril (ALTACE) 10 MG capsule Take 1 capsule (10 mg) by mouth daily 90 capsule 1     rizatriptan (MAXALT-MLT) 5 MG ODT Take 1 tablet (5 mg) by mouth at onset of headache for migraine 30 tablet 5     rosuvastatin (CRESTOR) 40 MG tablet TAKE 1 TABLET DAILY 90 tablet 1     syringe, disposable, (BD TUBERCULIN SYRINGE) 1 ML MISC Equipment being ordered: 1 ml tuberculin syringes to be used for Vitamin B12 injections. 12 each 1     vitamin C (ASCORBIC ACID) 500 MG tablet Take 500 mg by mouth daily       Allergies   Allergen Reactions     Amoxicillin-Pot Clavulanate Difficulty breathing     Banana Shortness Of Breath     Pt reports organic Banana is okay.      Nitroglycerin Palpitations     Penicillins Anaphylaxis     Provigil [Modafinil] Shortness Of Breath     headache     Gadolinium Hives and Itching     Patient was premedicated for the contrast allergy. He did still have a reaction a few hours after injection. Hives and itching. Dr. Gomez told tech to inform pt he should  "only have contrast again in the future when premedicated and at a hospital. Not at an outpatient facility.      Ketoconazole      Topical cream caused swelling and itching     Dye [Contrast Dye] Other (See Comments) and Hives     Moderate flushing, CT contrast     Golimumab      Hives, bradycardia, face swelling     Neurontin [Gabapentin] Hives     Moderate hives     Nortriptyline Hives     Varicella Virus Vaccine Live      Rash     Flagyl [Metronidazole Hcl] Palpitations and Hives     Latex Rash     Metronidazole Palpitations, Other (See Comments) and Rash     dizziness (versus ciprofloxacin taken at same time)     No Clinical Screening - See Comments Rash     Nitrile gloves       Reviewed and updated as needed this visit by Provider  Tobacco  Allergies  Meds  Problems  Med Hx  Surg Hx  Fam Hx         Review of Systems   ROS COMP: Constitutional, HEENT, cardiovascular, pulmonary, gi and gu systems are negative, except as otherwise noted.      Objective    /67 (BP Location: Right arm, Patient Position: Chair, Cuff Size: Adult Large)   Pulse 71   Temp 97.8  F (36.6  C) (Oral)   Resp 19   Ht 1.956 m (6' 5\")   Wt 146.5 kg (323 lb)   SpO2 98%   BMI 38.30 kg/m    Body mass index is 38.3 kg/m .  Physical Exam   GENERAL: healthy, alert and no distress  EYES: Eyes grossly normal to inspection, PERRL and conjunctivae and sclerae normal  HENT: ear canals and TM's normal, nose and mouth without ulcers or lesions  NECK: no adenopathy, no asymmetry, masses, or scars and thyroid normal to palpation  RESP: lungs clear to auscultation - no rales, rhonchi or wheezes  CV: regular rate and rhythm, normal S1 S2, no S3 or S4, no murmur, click or rub  ABDOMEN: soft, slight discomfort,  no hepatosplenomegaly, no masses and bowel sounds hypoactive  MS: no gross musculoskeletal defects noted, no edema    Diagnostic Test Results:  Labs reviewed in Epic  No results found. However, due to the size of the patient record, not " all encounters were searched. Please check Results Review for a complete set of results.        Assessment & Plan     1. Gastroparesis  In ER due to flare. On-going issues. Advised with reglan and his psych medications needs to be cautious about use and side effects. Monitor closely.   Continue to watch how much and how fast he eats.     2. Nausea  Refilled with lower dose, 10 mg he feels too sleepy.   - metoclopramide (REGLAN) 5 MG tablet; Take 1 tablet (5 mg) by mouth 3 times daily as needed (nausea)  Dispense: 90 tablet; Refill: 1    3. Type 2 diabetes mellitus with complication, without long-term current use of insulin (H)  Just had check up with PCP, A1C was 6 a month ago. Follow up with PCP in 2 months.     Will send to PCP as JOCELYN Carney, NP-C  [facility]    Return in about 2 months (around 4/17/2020).    Patient was okay with student nurse practitioner to be present during the visit. Student did not examine patient and student did not help with documentation. Provider was present during exam and also performed the exam and  documentation.

## 2020-02-18 ASSESSMENT — ANXIETY QUESTIONNAIRES: GAD7 TOTAL SCORE: 13

## 2020-02-19 ENCOUNTER — TELEPHONE (OUTPATIENT)
Dept: PALLIATIVE MEDICINE | Facility: CLINIC | Age: 45
End: 2020-02-19

## 2020-02-19 NOTE — TELEPHONE ENCOUNTER
Patient is calling to talk with Dr Diaz about some new Dx he has, he was tested for gastroparesis and also has testing coming up at [facility] for autonomic neuropathy. He is not sure if you would see him for this and how this should be scheduled.         Please advise      Patient was last seen in office 4/2019        Lorraine CHAVARRIA    [facility]

## 2020-02-20 ENCOUNTER — MYC REFILL (OUTPATIENT)
Dept: FAMILY MEDICINE | Facility: CLINIC | Age: 45
End: 2020-02-20

## 2020-02-20 DIAGNOSIS — G89.4 CHRONIC PAIN SYNDROME: Chronic | ICD-10-CM

## 2020-02-20 DIAGNOSIS — M51.369 DDD (DEGENERATIVE DISC DISEASE), LUMBAR: ICD-10-CM

## 2020-02-20 DIAGNOSIS — M45.8 ANKYLOSING SPONDYLITIS OF SACRAL REGION (H): ICD-10-CM

## 2020-02-20 NOTE — TELEPHONE ENCOUNTER
Outreach X1. Left a  requesting call back. Provided call back number.    JAVON GarciaN, RN  Care Coordinator  [facility] [place]

## 2020-02-21 RX ORDER — OXYCODONE HYDROCHLORIDE 5 MG/1
5-10 TABLET ORAL EVERY 6 HOURS PRN
Qty: 35 TABLET | Refills: 0 | Status: SHIPPED | OUTPATIENT
Start: 2020-02-21 | End: 2020-03-16

## 2020-02-21 NOTE — TELEPHONE ENCOUNTER
Controlled Substance Refill Request for Oxycodone 5 mg  Problem List Complete:  Yes  Overview Addendum 2/21/2020  9:32 AM by Sapna Barragan RN   Patient is followed by Ksenia Lyles MD for ongoing prescription of pain medication.  All refills should only be approved by this provider, or covering partner.     Medication(s): oxy 5.   Maximum quantity per month: 40  Clinic visit frequency required: Q3  months      Controlled substance agreement: 6/12/19  Encounter-Level CSA - 04/04/2017:            Controlled Substance Agreement - Scan on 4/11/2017  1:30 PM : CONTROLLED SUBSTANCE AGREEMENT (below)                   Pain Clinic evaluation in the past: Yes     DIRE Total Score(s):  No flowsheet data found.     Last [place]NP website verification:  02/21/2020     [place] reviewed:  reviewed- no concerns    [place]  [email or web address]    Sapna Barragan RN, [facility] Triage

## 2020-02-21 NOTE — TELEPHONE ENCOUNTER
Requested Prescriptions   Pending Prescriptions Disp Refills     oxyCODONE 5 MG PO tablet        Last Written Prescription Date:  1/29/20  Last Fill Quantity: 35 tablet,   # refills: 0  Last Office Visit: .hoc  Future Office visit:    Next 5 appointments (look out 90 days)    Apr 22, 2020  2:40 PM CDT  Return Visit with Oneil Baxter MD  [facility] ([place] [id number] [facility] [address]  [phone]           Routing refill request to provider for review/approval because:  Drug not on the [facility], [facility] or [facility] refill protocol or controlled substance   35 tablet 0     Sig: Take 1-2 tablets (5-10 mg) by mouth every 6 hours as needed for pain (maximum 4 tablet(s) per day)       There is no refill protocol information for this order

## 2020-02-24 NOTE — TELEPHONE ENCOUNTER
Patient was diagnosed with gastroparesis. He reports it is intermittently very painful.     He realizes he only sees Dr Diaz for back pain but is wondering if he could see her for the gastroparesis.     ISHA Garcia, RN  Care Coordinator  [facility] [place]

## 2020-02-25 ENCOUNTER — TRANSFERRED RECORDS (OUTPATIENT)
Dept: HEALTH INFORMATION MANAGEMENT | Facility: CLINIC | Age: 45
End: 2020-02-25

## 2020-02-25 NOTE — TELEPHONE ENCOUNTER
I haven't seen him in an office visit since April.  It is fine for him to set up a visit, but a lot of the treatment gastroporesis is done with GI doctors.  We sometimes try medications like gabapentin but there isn't a specific plan we use to treat gastroparesis.    Ericka Diaz MD  [facility]

## 2020-02-26 NOTE — TELEPHONE ENCOUNTER
Called pt and relayed all of Dr. Diaz's message.  He didn't want to schedule at this time but is thankful for the information.    Eloisa Oconnell RN-BSN  [facility]

## 2020-02-27 ENCOUNTER — TELEPHONE (OUTPATIENT)
Dept: NEUROLOGY | Facility: CLINIC | Age: 45
End: 2020-02-27

## 2020-02-27 NOTE — TELEPHONE ENCOUNTER
Received EMG report from [facility]  Records Date of service: 2/25/20  Copy has been sent to scanning and encounter routed to specialty nurse for review.FYI was was placed in provider folder

## 2020-02-28 ENCOUNTER — MYC REFILL (OUTPATIENT)
Dept: FAMILY MEDICINE | Facility: CLINIC | Age: 45
End: 2020-02-28

## 2020-02-28 DIAGNOSIS — I10 ESSENTIAL HYPERTENSION WITH GOAL BLOOD PRESSURE LESS THAN 140/90: Chronic | ICD-10-CM

## 2020-02-28 NOTE — TELEPHONE ENCOUNTER
"Requested Prescriptions   Pending Prescriptions Disp Refills     metoprolol succinate ER (TOPROL-XL) 200 MG 24 hr tablet  Last Written Prescription Date:  12/16/19  Last Fill Quantity: 180 tablet,  # refills: 1   Last office visit: 2/17/2020 with prescribing provider:  Dr. Lyles   Future Office Visit:   Next 5 appointments (look out 90 days)    Apr 22, 2020  2:40 PM CDT  Return Visit with Oneil Baxter MD  [facility] ([address] [address]  [phone]          180 tablet 1     Sig: Take 1 tablet (200 mg) by mouth 2 times daily       Beta-Blockers Protocol Passed - 2/28/2020  1:44 PM        Passed - Blood pressure under 140/90 in past 12 months     BP Readings from Last 3 Encounters:   02/17/20 127/67   02/06/20 126/80   01/24/20 126/85                 Passed - Patient is age 6 or older        Passed - Recent (12 mo) or future (30 days) visit within the authorizing provider's specialty     Patient has had an office visit with the authorizing provider or a provider within the authorizing providers department within the previous 12 mos or has a future within next 30 days. See \"Patient Info\" tab in inbasket, or \"Choose Columns\" in Meds & Orders section of the refill encounter.              Passed - Medication is active on med list          "

## 2020-03-03 ENCOUNTER — TELEPHONE (OUTPATIENT)
Dept: PULMONOLOGY | Facility: CLINIC | Age: 45
End: 2020-03-03
Payer: MEDICARE

## 2020-03-03 ENCOUNTER — MYC REFILL (OUTPATIENT)
Dept: FAMILY MEDICINE | Facility: CLINIC | Age: 45
End: 2020-03-03

## 2020-03-03 DIAGNOSIS — E53.8 B12 DEFICIENCY: ICD-10-CM

## 2020-03-03 DIAGNOSIS — J45.30 MILD PERSISTENT ASTHMA, UNSPECIFIED WHETHER COMPLICATED: Primary | ICD-10-CM

## 2020-03-03 DIAGNOSIS — J30.89 CHRONIC NON-SEASONAL ALLERGIC RHINITIS: ICD-10-CM

## 2020-03-03 RX ORDER — METOPROLOL SUCCINATE 200 MG/1
200 TABLET, EXTENDED RELEASE ORAL 2 TIMES DAILY
Qty: 180 TABLET | Refills: 0 | Status: SHIPPED | OUTPATIENT
Start: 2020-03-03 | End: 2020-07-19

## 2020-03-03 NOTE — TELEPHONE ENCOUNTER
"  Requested Prescriptions   Pending Prescriptions Disp Refills     cyanocobalamin (CYANOCOBALAMIN) 1000 MCG/ML injection  Last Written Prescription Date:  6/12/19  Last Fill Quantity: 10 mL,  # refills: 0   Last office visit: 2/17/2020 with prescribing provider:  Dr. Lyles   Future Office Visit:   Next 5 appointments (look out 90 days)    Apr 22, 2020  2:40 PM CDT  Return Visit with Oneil Baxter MD  [facility] ([address] [address]  [phone]          10 mL 0     Sig: Inject 1 mL (1,000 mcg) into the muscle every 30 days       Vitamin Supplements (Adult) Protocol Passed - 3/3/2020  1:14 PM        Passed - High dose Vitamin D not ordered        Passed - Recent (12 mo) or future (30 days) visit within the authorizing provider's specialty     Patient has had an office visit with the authorizing provider or a provider within the authorizing providers department within the previous 12 mos or has a future within next 30 days. See \"Patient Info\" tab in inbasket, or \"Choose Columns\" in Meds & Orders section of the refill encounter.              Passed - Medication is active on med list          "

## 2020-03-03 NOTE — TELEPHONE ENCOUNTER
Prior Authorization Retail Medication Request    Medication/Dose:     fluticasone-salmeterol (ADVAIR) 500-50 MCG/DOSE inhaler 3 Inhaler 3 2/3/2020  --   Sig - Route: Inhale 1 puff into the lungs every 12 hours - Inhalation     ICD code (if different than what is on RX): [J45.30]      Previously Tried and Failed:    Rationale:      Insurance Name:    Insurance ID:        Pharmacy Information (if different than what is on RX)  Name:    Phone:

## 2020-03-04 NOTE — TELEPHONE ENCOUNTER
Central Prior Authorization Team   [phone]    PA Initiation    Medication: fluticasone-salmeterol (ADVAIR) 500-50 MCG/DOSE inhaler  Insurance Company: [employer] - Phone [phone] Fax [phone]  Pharmacy Filling the Rx: [id number] - [place], [place] - [address]  Filling Pharmacy Phone: [phone]  Filling Pharmacy Fax: [phone]  Start Date: 3/4/2020

## 2020-03-05 RX ORDER — CYANOCOBALAMIN 1000 UG/ML
1 INJECTION, SOLUTION INTRAMUSCULAR; SUBCUTANEOUS
Qty: 10 ML | Refills: 0 | Status: SHIPPED | OUTPATIENT
Start: 2020-03-05 | End: 2020-12-18

## 2020-03-06 NOTE — TELEPHONE ENCOUNTER
PRIOR AUTHORIZATION DENIED    Medication: fluticasone-salmeterol (ADVAIR) 500-50 MCG/DOSE inhaler-PA DENIED     Denial Date: 3/5/2020    Denial Rational: Criteria Not Met. Insurance stated that patient must tried/failed Symbiort, Breo Ellipta.           Appeal Information:

## 2020-03-06 NOTE — TELEPHONE ENCOUNTER
Prescription approved per G Refill Protocol.    Dianne Henderson RN  [facility]

## 2020-03-09 ENCOUNTER — MYC REFILL (OUTPATIENT)
Dept: FAMILY MEDICINE | Facility: CLINIC | Age: 45
End: 2020-03-09

## 2020-03-09 DIAGNOSIS — E78.5 HYPERLIPIDEMIA LDL GOAL <70: ICD-10-CM

## 2020-03-09 DIAGNOSIS — E78.1 HYPERTRIGLYCERIDEMIA: ICD-10-CM

## 2020-03-09 NOTE — TELEPHONE ENCOUNTER
LM for patient to call back to discuss.     Jones Alvarado RN   Medical [facility]ty Clinic Care Coordinator  [facility] MD Balcome, Debbie A, RN; P [facility] [place]    Caller: Unspecified (6 days ago,  8:45 AM)               I am fine with this formulary switch, he could either go to low dose symbicort 80/4.5 or low dose Breo 100/5.  For some reason though, it sticks in my mind that he was maybe particular about his Advair.  Can you reach out to him and see if this is the case, and if so what the reason is?  If there is a good reason, we can attempt to appeal this decision, but I am not optimistic we will win.  If he doesn't care, we can just switch him.     Thanks.

## 2020-03-10 NOTE — TELEPHONE ENCOUNTER
"Requested Prescriptions   Pending Prescriptions Disp Refills     omega-3 acid ethyl esters (LOVAZA) 1 g capsule  Last Written Prescription Date:  12/16/19  Last Fill Quantity: 360 capsule,  # refills: 3   Last office visit: 2/17/2020 with prescribing provider:  Dr. Lyles   Future Office Visit:   Next 5 appointments (look out 90 days)    Apr 22, 2020  2:40 PM CDT  Return Visit with Oneil Baxter MD  [facility] ([facility]) [address] [facility] [address]  [phone]          360 capsule 3     Sig: TAKE 2 CAPSULES BY MOUTH TWICE DAILY.       Antihyperlipidemic agents Passed - 3/10/2020  6:29 AM        Passed - Lipid panel on file in past 12 mos     Recent Labs   Lab Test 09/13/19  0943  12/03/14  0958   CHOL 150   < > 189   TRIG 468*   < > 487*   HDL 33*   < > 27*   LDL Cannot estimate LDL when triglyceride exceeds 400 mg/dL  64   < > Cannot estimate LDL when triglyceride exceeds 400 mg/dL  108   NHDL 117   < >  --    VLDL  --   --  Cannot estimate VLDL when triglyceride exceeds 400 mg/dL.   CHOLHDLRATIO  --   --  7.0*    < > = values in this interval not displayed.               Passed - Normal serum ALT on record in past 12 mos     Recent Labs   Lab Test 01/24/20  1637   ALT 61             Passed - Recent (12 mo) or future (30 days) visit within the authorizing provider's specialty     Patient has had an office visit with the authorizing provider or a provider within the authorizing providers department within the previous 12 mos or has a future within next 30 days. See \"Patient Info\" tab in inbasket, or \"Choose Columns\" in Meds & Orders section of the refill encounter.              Passed - Medication is active on med list        Passed - Patient is age 18 years or older             "

## 2020-03-11 ENCOUNTER — OFFICE VISIT (OUTPATIENT)
Dept: FAMILY MEDICINE | Facility: CLINIC | Age: 45
End: 2020-03-11
Payer: MEDICARE

## 2020-03-11 VITALS
OXYGEN SATURATION: 100 % | TEMPERATURE: 98.2 F | DIASTOLIC BLOOD PRESSURE: 86 MMHG | HEIGHT: 77 IN | BODY MASS INDEX: 37.19 KG/M2 | WEIGHT: 315 LBS | HEART RATE: 73 BPM | SYSTOLIC BLOOD PRESSURE: 126 MMHG

## 2020-03-11 DIAGNOSIS — J01.00 ACUTE NON-RECURRENT MAXILLARY SINUSITIS: Primary | ICD-10-CM

## 2020-03-11 PROCEDURE — 99213 OFFICE O/P EST LOW 20 MIN: CPT | Performed by: NURSE PRACTITIONER

## 2020-03-11 RX ORDER — OMEGA-3-ACID ETHYL ESTERS 1 G/1
CAPSULE, LIQUID FILLED ORAL
Qty: 360 CAPSULE | Refills: 2 | Status: SHIPPED | OUTPATIENT
Start: 2020-03-11 | End: 2020-10-06

## 2020-03-11 RX ORDER — DOXYCYCLINE 100 MG/1
100 CAPSULE ORAL 2 TIMES DAILY
Qty: 14 CAPSULE | Refills: 0 | Status: SHIPPED | OUTPATIENT
Start: 2020-03-11 | End: 2020-03-16

## 2020-03-11 ASSESSMENT — MIFFLIN-ST. JEOR: SCORE: 2476.11

## 2020-03-11 NOTE — PROGRESS NOTES
Subjective     Joel Pineda is a 44 year old male who presents to clinic today for the following health issues:    HPI   Acute Illness   Acute illness concerns: Sinus problem   Onset: 2 weeks     Fever: no     Chills/Sweats: YES     Headache (location?): YES     Sinus Pressure:YES    Conjunctivitis:  no    Ear Pain: no    Rhinorrhea: no     Congestion: YES- pink nasal discharge and post nasal drip     Sore Throat: no      Cough: YES-productive of cloudy sputum    Wheeze: no     Decreased Appetite: no     Nausea: no     Vomiting: no     Diarrhea:  no     Dysuria/Freq.: no     Fatigue/Achiness: YES-Achiness in neck     Sick/Strep Exposure: no      Therapies Tried and outcome: Maxalt with relief for headaches     Breathing okay, have some pink discharge out of nose, coughing up creamy mucus. Went to dentist and had x-rays for routine dental stuff, noted cloudy sinus. He also has had some teeth pain. +post-nasal drip and using inhaler more lately.         Patient Active Problem List   Diagnosis     Major depressive disorder, recurrent episode (H)     Intermittent asthma     Hyperlipidemia LDL goal <100     Chronic nonallergic rhinitis     Diverticulosis     GERD (gastroesophageal reflux disease)     Anxiety     LLOYD (obstructive sleep apnea)- mild (AHI 11)     Intracranial arachnoid cyst     Facet arthritis of cervical region     Acquired hypothyroidism     Bipolar 2 disorder (H)     Chronic midline low back pain without sciatica     Irritable bowel syndrome with diarrhea     B12 deficiency     Essential hypertension with goal blood pressure less than 140/90     Chronic pain syndrome     Ankylosing spondylitis of sacral region (H)     Morbid obesity due to hypertriglyceridemia (H)     Fatty infiltration of liver     DDD (degenerative disc disease), lumbar     Type 2 diabetes mellitus with complication, without long-term current use of insulin (H)     Peripheral polyneuropathy     History of pulmonary embolism      Ingrown toenail     Lipoma of skin and subcutaneous tissue     Hypertriglyceridemia     Gastroparesis     Past Surgical History:   Procedure Laterality Date     BACK SURGERY  10/07    lumbar discectomy L5-S1     COLONOSCOPY      Note: colonoscopy scheduled with [facility] on Friday, 9/4/15     COSMETIC SURGERY  2012    Nose Exterior - functional     GI SURGERY  August 2013    Sigmoidectomy     HERNIA REPAIR, UMBILICAL  8/23/11    [place], Dr. Evan Beavers     INCISION AND DRAINAGE, ABSCESS, COMPLEX  8/23/11    umbilical, Dr. Evan Beavers     LAPAROSCOPIC ASSISTED COLECTOMY LEFT (DESCENDING)  8/15/2013    Procedure: LAPAROSCOPIC ASSISTED COLECTOMY LEFT (DESCENDING);  Laparoscopic Hand Assisted Sigmoid Resection, Mobilization of Splenic Fissure, coloproctoscopy, *Latex Free Room* Anesthesia General with Pain block  ;  Surgeon: Aurora Justice MD;  Location: [facility] OR     NERVE SURGERY  8/18/11    RF ablation @ L3-S1 @ [facility]     RECONSTRUCT NOSE AND SEPTUM (FUNCTIONAL)  10/14/2011    Procedure:RECONSTRUCT NOSE AND SEPTUM (FUNCTIONAL); Functional Septorhinoplasty, Turbinate Reduction, ; Surgeon:CEDRIC CUEVAS; Location:[facility] OR     SINUS SURGERY  10/1/01    ethmoidectomy chronic sinusitis       Social History     Tobacco Use     Smoking status: Never Smoker     Smokeless tobacco: Never Used   Substance Use Topics     Alcohol use: Yes     Alcohol/week: 0.0 standard drinks     Drinks per session: 1 or 2     Binge frequency: Weekly     Comment: occ 1/week     Family History   Problem Relation Age of Onset     Musculoskeletal Disorder Mother         back     Anxiety Disorder Mother      Colon Polyps Mother      Ulcerative Colitis Mother         and ischemic small intestine, surgery     Hypertension Mother      Breast Cancer Mother      Osteoporosis Mother      Diabetes Mother         Type 2, Diagnosed in 2014     Depression Mother         Takes Cymbalta to help with chronic pain + depx     Thyroid Disease Mother         Hypothyroidism  "    Obesity Mother         Under much better control latter half of 2015     Musculoskeletal Disorder Father         back     Substance Abuse Father      Hypertension Father      Hyperlipidemia Father      Depression Father         Off meds for many years. Seems \"ok\"     Heart Disease Maternal Grandmother      Heart Disease Maternal Grandfather      Psychotic Disorder Paternal Grandfather      Suicide Paternal Grandfather      Depression Paternal Grandfather         Pediatrician. Committed suicide by pistol in 1990.     Musculoskeletal Disorder Brother         back     Depression Brother         Expressed as anger and moodiness     Substance Abuse Sister      Depression Sister         Mental Health Therapist, yet no anti-depressants?     Anxiety Disorder Sister         Mental Health Therapist, yet no anti-anxiety meds?     Other Cancer Other         Bladder Cancer - Fatal     Substance Abuse Brother      Colon Cancer No family hx of      Crohn's Disease No family hx of      Anesthesia Reaction No family hx of      Cancer No family hx of         No family history of skin cancer         Current Outpatient Medications   Medication Sig Dispense Refill     acetaminophen 500 MG CAPS Take 500 mg by mouth every 4 hours as needed 60 capsule      albuterol (PROAIR HFA/PROVENTIL HFA/VENTOLIN HFA) 108 (90 Base) MCG/ACT inhaler Inhale 2 puffs into the lungs every 4 hours as needed for shortness of breath / dyspnea or wheezing 8.5 g 11     ALPRAZolam (XANAX XR) 0.5 MG 24 hr tablet Take 0.5 mg by mouth 3 times daily as needed        aspirin (ASA) 81 MG tablet Take 81 mg by mouth daily        celecoxib (CELEBREX) 200 MG capsule TAKE 1 CAPSULE 2 TIMES     DAILY AS NEEDED MODERATE   PAIN 180 capsule 1     cetirizine (ZYRTEC) 10 MG tablet Take 1 tablet (10 mg) by mouth At Bedtime 30 tablet 11     cholecalciferol (VITAMIN D3) 24946 units (1250 mcg) capsule Take one capsule every two weeks. 24 capsule 1     cyanocobalamin " (CYANOCOBALAMIN) 1000 MCG/ML injection Inject 1 mL (1,000 mcg) into the muscle every 30 days 10 mL 0     doxycycline hyclate (VIBRAMYCIN) 100 MG capsule Take 1 capsule (100 mg) by mouth 2 times daily for 7 days 14 capsule 0     DULoxetine (CYMBALTA) 60 MG capsule Take 60 mg by mouth 2 times daily        EPINEPHrine (EPIPEN/ADRENACLICK/OR ANY BX GENERIC EQUIV) 0.3 MG/0.3ML injection 2-pack Inject 0.3 mLs (0.3 mg) into the muscle once as needed for anaphylaxis 0.6 mL 3     etanercept (ENBREL SURECLICK) 50 MG/ML autoinjector Inject 50 mg Subcutaneous once a week Hold for signs of infection, and seek medical attention. 4 mL 5     famotidine (PEPCID) 20 MG tablet Prior to administration of Humira every 2 weeks       fluticasone-salmeterol (ADVAIR) 500-50 MCG/DOSE inhaler Inhale 1 puff into the lungs every 12 hours 3 Inhaler 3     Ginger, Zingiber officinalis, (GINGER ROOT) 550 MG CAPS capsule Take 550 mg by mouth Up to three times daily       hydrOXYzine (ATARAX) 50 MG tablet Take 50 mg by mouth 2 times daily For anxiety and insomnia       lamoTRIgine (LAMICTAL) 100 MG tablet Take 200 mg by mouth daily       levothyroxine (SYNTHROID/LEVOTHROID) 75 MCG tablet TAKE 1 TABLET EVERY MORNING 90 tablet 1     Liniments (SALONPAS EX) Externally apply 1 Application topically daily as needed       loperamide (IMODIUM) 2 MG capsule Take 2 mg by mouth 4 times daily as needed for diarrhea       melatonin 3 MG tablet Take 1 mg by mouth At Bedtime       metFORMIN (GLUCOPHAGE-XR) 500 MG 24 hr tablet Take 2 tablets (1,000 mg) by mouth daily (with dinner) 180 tablet 1     metoclopramide (REGLAN) 5 MG tablet Take 1 tablet (5 mg) by mouth 3 times daily as needed (nausea) 90 tablet 1     metoprolol succinate ER (TOPROL-XL) 200 MG 24 hr tablet Take 1 tablet (200 mg) by mouth 2 times daily 180 tablet 0     montelukast (SINGULAIR) 10 MG tablet Take 1 tablet (10 mg) by mouth every evening 90 tablet 1     omega-3 acid ethyl esters (LOVAZA) 1 g  capsule TAKE 2 CAPSULES BY MOUTH TWICE DAILY. 360 capsule 3     omeprazole 20 MG tablet Take 20 mg by mouth daily        order for DME Equipment being ordered: lumbosacral belt/brace 1 Units 0     order for DME Respironics REMSTAR 60 Series Auto CPAP 9-13 cm H2O, Wisp nasal mask w/a large cushion and a chinstrap       oxyCODONE (ROXICODONE) 5 MG tablet Take 1-2 tablets (5-10 mg) by mouth every 6 hours as needed for pain (maximum 4 tablet(s) per day) 35 tablet 0     pregabalin (LYRICA) 150 MG capsule Take 1 capsule (150 mg) by mouth 2 times daily 60 capsule 5     QUEtiapine (SEROQUEL) 25 MG tablet Take 25 mg by mouth 4 times daily as needed       ramipril (ALTACE) 10 MG capsule Take 1 capsule (10 mg) by mouth daily 90 capsule 1     rizatriptan (MAXALT-MLT) 5 MG ODT Take 1 tablet (5 mg) by mouth at onset of headache for migraine 30 tablet 5     rosuvastatin (CRESTOR) 40 MG tablet TAKE 1 TABLET DAILY 90 tablet 1     syringe, disposable, (BD TUBERCULIN SYRINGE) 1 ML MISC Equipment being ordered: 1 ml tuberculin syringes to be used for Vitamin B12 injections. 12 each 11     vitamin C (ASCORBIC ACID) 500 MG tablet Take 500 mg by mouth daily       Allergies   Allergen Reactions     Amoxicillin-Pot Clavulanate Difficulty breathing     Banana Shortness Of Breath     Pt reports organic Banana is okay.      Nitroglycerin Palpitations     Penicillins Anaphylaxis     Provigil [Modafinil] Shortness Of Breath     headache     Gadolinium Hives and Itching     Patient was premedicated for the contrast allergy. He did still have a reaction a few hours after injection. Hives and itching. Dr. Gomez told tech to inform pt he should only have contrast again in the future when premedicated and at a hospital. Not at an outpatient facility.      Ketoconazole      Topical cream caused swelling and itching     Dye [Contrast Dye] Other (See Comments) and Hives     Moderate flushing, CT contrast     Golimumab      Hives, bradycardia,  "face swelling     Neurontin [Gabapentin] Hives     Moderate hives     Nortriptyline Hives     Varicella Virus Vaccine Live      Rash     Flagyl [Metronidazole Hcl] Palpitations and Hives     Latex Rash     Metronidazole Palpitations, Other (See Comments) and Rash     dizziness (versus ciprofloxacin taken at same time)     No Clinical Screening - See Comments Rash     Nitrile gloves       Reviewed and updated as needed this visit by Provider  Tobacco  Allergies  Meds  Problems  Med Hx  Surg Hx  Fam Hx         Review of Systems   ROS COMP: Constitutional, HEENT-as above, cardiovascular, pulmonary, gi and gu systems are negative, except as otherwise noted.      Objective    /86 (BP Location: Right arm, Patient Position: Chair, Cuff Size: Adult Large)   Pulse 73   Temp 98.2  F (36.8  C) (Oral)   Ht 1.956 m (6' 5\")   Wt 147.9 kg (326 lb)   SpO2 100%   BMI 38.66 kg/m    Body mass index is 38.66 kg/m .  Physical Exam   GENERAL: healthy, alert and no distress  EYES: Eyes grossly normal to inspection, PERRL and conjunctivae and sclerae normal  HENT: ear canals and TM's normal, nose and mouth without ulcers or lesions, slight tenderness to palpation in maxillary and frontal sinus  NECK: no adenopathy, no asymmetry, masses, or scars and thyroid normal to palpation  RESP: lungs clear to auscultation - no rales, rhonchi or wheezes  CV: regular rate and rhythm, normal S1 S2, no S3 or S4, no murmur, click or rub  MS: no gross musculoskeletal defects noted    Diagnostic Test Results:  Labs reviewed in Epic  No results found. However, due to the size of the patient record, not all encounters were searched. Please check Results Review for a complete set of results.        Assessment & Plan     1. Acute non-recurrent maxillary sinusitis  Patient has a lot of allergies, will trial doxycycline.  If is not working we may need to consider Levaquin  - doxycycline hyclate (VIBRAMYCIN) 100 MG capsule; Take 1 capsule (100 " mg) by mouth 2 times daily for 7 days  Dispense: 14 capsule; Refill: 0         Return in about 1 week (around 3/18/2020), or if symptoms worsen or fail to improve.    JOCELYN Pro, NP-C  [facility]    This chart was documented by provider using a voice activated software called Dragon in addition to manual typing. There may be vocabulary errors or other grammatical errors due to this.

## 2020-03-12 ENCOUNTER — MYC REFILL (OUTPATIENT)
Dept: FAMILY MEDICINE | Facility: CLINIC | Age: 45
End: 2020-03-12

## 2020-03-12 DIAGNOSIS — M45.8 ANKYLOSING SPONDYLITIS OF SACRAL REGION (H): ICD-10-CM

## 2020-03-12 DIAGNOSIS — M51.369 DDD (DEGENERATIVE DISC DISEASE), LUMBAR: ICD-10-CM

## 2020-03-12 RX ORDER — OXYCODONE HYDROCHLORIDE 5 MG/1
5-10 TABLET ORAL EVERY 6 HOURS PRN
Qty: 35 TABLET | Refills: 0 | Status: CANCELLED | OUTPATIENT
Start: 2020-03-12

## 2020-03-12 NOTE — TELEPHONE ENCOUNTER
Requested Prescriptions   Pending Prescriptions Disp Refills     oxyCODONE (ROXICODONE) 5 MG tablet 35 tablet 0     Sig: Take 1-2 tablets (5-10 mg) by mouth every 6 hours as needed for pain (maximum 4 tablet(s) per day)       There is no refill protocol information for this order          oxyCODONE (ROXICODONE) 5 MG tablet      Last Written Prescription Date:  2/21/2020  Last Fill Quantity: 35,   # refills: 0  Last Office Visit: 3/11/2020  Future Office visit:    Next 5 appointments (look out 90 days)    Apr 22, 2020  2:40 PM CDT  Return Visit with Oneil Baxter MD  [facility] ([facility]) [address]  [place] [address] [phone]           Routing refill request to provider for review/approval because:  Drug not on the [facility], [facility] or [facility] refill protocol or controlled substance

## 2020-03-12 NOTE — TELEPHONE ENCOUNTER
Remaining refills sent to patient's requested pharmacy.   Heather White RN  [facility]

## 2020-03-13 ENCOUNTER — TELEPHONE (OUTPATIENT)
Dept: NEUROLOGY | Facility: CLINIC | Age: 45
End: 2020-03-13

## 2020-03-13 ASSESSMENT — ENCOUNTER SYMPTOMS
CHILLS: 1
STIFFNESS: 1
HALLUCINATIONS: 0
WEAKNESS: 0
FLANK PAIN: 0
BLOOD IN STOOL: 0
EYE REDNESS: 1
INSOMNIA: 1
JAUNDICE: 0
NAIL CHANGES: 1
SYNCOPE: 0
SORE THROAT: 0
DECREASED APPETITE: 1
TASTE DISTURBANCE: 0
BOWEL INCONTINENCE: 0
FATIGUE: 1
CONSTIPATION: 1
HYPERTENSION: 1
DISTURBANCES IN COORDINATION: 1
JOINT SWELLING: 0
VOMITING: 0
HEARTBURN: 1
SINUS CONGESTION: 1
NUMBNESS: 1
TREMORS: 0
PANIC: 0
SEIZURES: 0
HOARSE VOICE: 0
LIGHT-HEADEDNESS: 1
MUSCLE CRAMPS: 0
BLOATING: 1
FEVER: 0
ABDOMINAL PAIN: 1
TROUBLE SWALLOWING: 0
ALTERED TEMPERATURE REGULATION: 1
DIARRHEA: 1
NAUSEA: 1
MEMORY LOSS: 1
MUSCLE WEAKNESS: 0
TINGLING: 1
LEG PAIN: 1
NECK MASS: 0
SMELL DISTURBANCE: 0
ARTHRALGIAS: 1
ORTHOPNEA: 0
HYPOTENSION: 0
RECTAL PAIN: 1
WEIGHT GAIN: 0
POOR WOUND HEALING: 0
HEADACHES: 1
INCREASED ENERGY: 1
DIZZINESS: 1
PARALYSIS: 0
MYALGIAS: 1
DECREASED CONCENTRATION: 1
SLEEP DISTURBANCES DUE TO BREATHING: 0
EYE IRRITATION: 1
BACK PAIN: 1
HEMATURIA: 0
POLYPHAGIA: 0
NERVOUS/ANXIOUS: 1
DYSURIA: 0
EYE PAIN: 1
POLYDIPSIA: 1
NIGHT SWEATS: 1
SPEECH CHANGE: 0
SINUS PAIN: 0
DEPRESSION: 1
NECK PAIN: 1
EYE WATERING: 0
WEIGHT LOSS: 1
PALPITATIONS: 1
LOSS OF CONSCIOUSNESS: 0
SKIN CHANGES: 0
DOUBLE VISION: 1

## 2020-03-13 NOTE — TELEPHONE ENCOUNTER
Left a message stating due to the recent developments of COVID-19 we are switching all office visits to telephone visits for your safety.    Please bare with us during this new process. The provider may call you 15 minutes earlier or later than the expected appointment time.    Please have all your medications in front of you for the visit, one of our CMA's will call you to go through your medications about 15 minutes before the visit.    We will call you at:  [phone]

## 2020-03-13 NOTE — TELEPHONE ENCOUNTER
Patient contacted clinic regarding Advair PA. Patient states that generic form of Advair is not covered under current insurance plan, however, brand name Advair is. Patient is okay with this and states that no more action from pulm team is required regarding Advair issues.    Patient states that he has been feeling more short of breath with productive cough for he last 3 days. Patient states that sputum is not discolored. Patient is unsure if he has a fever, as he has been using Tylenol. Patient is on Doxycycline currently for a sinus infection. Patient also on Enbrel.    Patient states that he was given albuterol nebs from [facility] in 2018 and those were helpful. Patient wondering if Dr. Segovia would be comfortable prescribing albuterol nebs PRN.    Will send message to Dr. Segovia regarding neb request.

## 2020-03-14 ENCOUNTER — VIRTUAL VISIT (OUTPATIENT)
Dept: FAMILY MEDICINE | Facility: OTHER | Age: 45
End: 2020-03-14

## 2020-03-14 NOTE — PROGRESS NOTES
"Date: 2020 16:19:47  Clinician: Rosalind Benitez  Clinician NPI: [id number]  Patient: Joel Pineda  Patient : 1975  Patient Address: [address] [facility] [place] [address]  Patient Phone: [phone]  Visit Protocol: URI  Patient Summary:  Joel is a 44 year old ( : 1975 ) male who initiated a Visit for cold, sinus infection, or influenza. When asked the question \"Please sign me up to receive news, health information and promotions from [email or web address].\", Joel responded \"Yes\".    Joel states his symptoms started gradually 3-6 days ago. After his symptoms started, they improved and then got worse again.   His symptoms consist of ear pain, malaise, rhinitis, facial pain or pressure, myalgia, a sore throat, a cough, nasal congestion, tooth pain, and a headache. He is experiencing mild difficulty breathing with activities but can speak normally in full sentences. Joel also feels feverish.   Symptom details     Nasal secretions: The color of his mucus is blood-tinged, clear, white, and yellow.    Cough: Joel coughs a few times an hour and his cough is not more bothersome at night. Phlegm comes into his throat when he coughs. He does not believe his cough is caused by post-nasal drip. The color of the phlegm is white.     Sore throat: Joel reports having mild throat pain (1-3 on a 10 point pain scale), has exudate on his tonsils, and can swallow liquids. He is not sure if the lymph nodes in his neck are enlarged. A rash has not appeared on the skin since the sore throat started.     Temperature: His current temperature is 97.7 degrees Fahrenheit.     Facial pain or pressure: The facial pain or pressure feels worse when bending over or leaning forward.     Headache: He states the headache is moderate (4-6 on a 10 point pain scale).     Tooth pain: The tooth pain is not caused by a cavity, recent dental work, or other mouth problems.      Joel denies having chills and wheezing. He also " denies having recent facial or sinus surgery in the past 60 days.   Precipitating events  Joel is not sure if he has been exposed to someone with strep throat. He has not recently been exposed to someone with influenza. Joel has been in close contact with the following high risk individuals: people with asthma, heart disease or diabetes and immunocompromised people.   Pertinent COVID-19 (Coronavirus) information  Joel has not traveled internationally or to the areas where COVID-19 (Coronavirus) is widespread in the last 14 days before the start of his symptoms.   Joel has not had close contact with a suspected or laboratory-confirmed COVID-19 patient within 14 days of symptom onset.   Joel is not a healthcare worker and does not work in a healthcare facility.   Pertinent medical history  Joel had 2 sinus infections within the past year.   Joel has taken an antibiotic medication in the past month. Antibiotic details as reported by the patient (free text): Doxycycline 100mg BID, started 03/11 due to sinus infection. Worsening upper-respiratory symptoms, and worsening lower-respiratory symptoms that necessitate rescue inhaler usage. O2 is 95+. Quite dizzy.   Joel does not need a return to work/school note.   Weight: 315 lbs   Joel does not smoke or use smokeless tobacco.   Additional information as reported by the patient (free text): Can I wait until Monday to see my PCP? Can I go to PCP with current respiratory symptoms? I know it will take more time for Doxycycline to do its job. Due to Ankylosing Spondylitis, in the past I received infused biologics. However, that would lead to respiratory illness. So, I'm now on Enbrel #1 weekly, and haven't felt this drained in a long time, with an ongoing headache (I have atypical bilateral ear infection since d/c infusions). There are too many variables for me to sort out.   Weight: 315 lbs    MEDICATIONS: Ciprodex otic (ear), Antifungal  (clotrimazole) topical, doxycycline hyclate oral, Maxalt-MLT oral, ramipril oral, Seroquel oral, Lyrica oral, oxycodone oral, famotidine oral, omeprazole oral, Lovaza oral, Singulair oral, metoprolol succinate oral, Glucophage XR oral, melatonin oral, hydroxyzine HCl oral, Advair Diskus inhalation, Cymbalta oral, cyanocobalamin (vit B-12) injection, Zyrtec oral, Celebrex oral, Aspirin Low Dose oral, alprazolam oral, Tylenol Extra Strength oral, Ventolin HFA inhalation, Enbrel SureClick subcutaneous, ALLERGIES: Penicillins, Flagyl  Clinician Response:  Dear Joel,  I am sorry you are not feeling well. To determine the most appropriate care for you, I would like you to be seen in person to further discuss your health history and symptoms.  You will not be charged for this Visit. Thank you for trusting us with your care.  COVID-19 (Coronavirus) General Information  With the increase in the number of COVID-19 (Coronavirus) cases, we understand you may have some questions. Below is some helpful information on COVID-19 (Coronavirus).  How can I protect myself and others from the COVID-19 (Coronavirus)?  Because there is currently no vaccine to prevent infection, the best way to protect yourself is to avoid being exposed to this virus. Put distance between yourself and other people if COVID-19 (Coronavirus) is spreading in your community. The virus is thought to spread mainly from person-to-person.     Between people who are in close contact with one another (within about 6 about) for prolonged period (10 minutes or longer).    Through respiratory droplets produced when an infected person coughs or sneezes.     The CDC recommends the following additional steps to protect yourself and others:     Wash your hands often with soap and water for at least 20 seconds, especially after blowing your nose, coughing, or sneezing; going to the bathroom; and before eating or preparing food.  Use an alcohol-based hand  that  contains at least 60 percent alcohol if soap and water are not available.        Avoid touching your eyes, nose and mouth with unwashed hands.    Avoid close contact with people who are sick.    Stay home when you are sick.    Cover your cough or sneeze with a tissue, then throw the tissue in the trash.    Clean and disinfect frequently touched objects and surfaces.     You can help stop COVID-19 (Coronavirus) by knowing the signs and symptoms:     Fever    Cough    Shortness of breath     Contact your healthcare provider if   Develop symptoms   AND   Have been in close contact with a person known to have COVID-19 (Coronavirus) or live in or have recently traveled from an area with ongoing spread of COVID-19 (Coronavirus). Call ahead before you go to a doctor's office or emergency room. Tell them about your recent travel and your symptoms.   For the most up to date information, visit the CDC's website.  Steps to help prevent the spread of COVID-19 (Coronavirus) if you are sick  If you are sick with COVID-19 (Coronavirus) or suspect you are infected with the virus that causes COVID-19 (Coronavirus), follow the steps below to help prevent the disease from spreading&nbsp;to people in your home and community.     Stay home except to get medical care. Home isolation may be started in consultation with your healthcare clinician.    Separate yourself from other people and animals in your home.    Call ahead before visiting your doctor if you have a medical appointment.    Wear a facemask when you are around other people.    Cover your cough and sneezes.    Clean your hands often.    Avoid sharing personal household items.    Clean and disinfect frequently touched objects and surfaces everyday.    You will need to have someone drop off medications or household supplies (if needed) at your house without coming inside or in contact with you or others living in your house.    Monitor your symptoms and seek prompt medical care  "if your illness is worsening (e.g. Difficulty breathing).    Discontinue home isolation only in consultation with your healthcare provider.     For more detailed and up to date information on what to do if you are sick, visit this link: What to Do If You Are Sick With Coronavirus Disease 2019 (COVID-19).  Do I need to be tested for COVID-19 (Coronavirus)?     At this time, the limited number of tests available are controlled by the state and local health departments and are being reserved for more seriously ill patients, those with known exposure to confirmed patients, and those with recent travel (within 14 days) to countries with high rates of COVID-19 (Coronavirus).    Decisions on which patients receive testing will be based on the local spread of COVID-19 (Coronavirus) as well as the symptoms. Your healthcare provider will make the final decision on whether you should be tested.    In the meantime, if you have concerns that you may have been exposed, it is reasonable to practice \"social distancing.\"&nbsp; If you are ill with a cold or flu-like illness, please monitor your symptoms and reach out to your healthcare provider if your symptoms worsen.    For more up to date information, visit this link: COVID-19 (Coronavirus) Frequently Asked Questions and Answers.      Diagnosis: Refer for additional evaluation  Diagnosis ICD: R69  Additional Clinician Notes: I think if you are on day 3 of antibiotics,  and do not feel that you are improving, you should be seen in urgent care or clinic for further assessment  "

## 2020-03-15 ENCOUNTER — MYC REFILL (OUTPATIENT)
Dept: FAMILY MEDICINE | Facility: CLINIC | Age: 45
End: 2020-03-15

## 2020-03-15 DIAGNOSIS — I10 ESSENTIAL HYPERTENSION WITH GOAL BLOOD PRESSURE LESS THAN 140/90: Chronic | ICD-10-CM

## 2020-03-15 DIAGNOSIS — E03.9 ACQUIRED HYPOTHYROIDISM: ICD-10-CM

## 2020-03-16 ENCOUNTER — MYC MEDICAL ADVICE (OUTPATIENT)
Dept: FAMILY MEDICINE | Facility: CLINIC | Age: 45
End: 2020-03-16

## 2020-03-16 ENCOUNTER — OFFICE VISIT (OUTPATIENT)
Dept: FAMILY MEDICINE | Facility: CLINIC | Age: 45
End: 2020-03-16
Payer: MEDICARE

## 2020-03-16 VITALS
HEART RATE: 79 BPM | BODY MASS INDEX: 37.19 KG/M2 | DIASTOLIC BLOOD PRESSURE: 79 MMHG | TEMPERATURE: 98.7 F | HEIGHT: 77 IN | RESPIRATION RATE: 20 BRPM | WEIGHT: 315 LBS | OXYGEN SATURATION: 97 % | SYSTOLIC BLOOD PRESSURE: 120 MMHG

## 2020-03-16 DIAGNOSIS — M51.369 DDD (DEGENERATIVE DISC DISEASE), LUMBAR: ICD-10-CM

## 2020-03-16 DIAGNOSIS — J01.00 ACUTE NON-RECURRENT MAXILLARY SINUSITIS: Primary | ICD-10-CM

## 2020-03-16 DIAGNOSIS — R09.82 POSTNASAL DRIP: ICD-10-CM

## 2020-03-16 DIAGNOSIS — K31.84 GASTROPARESIS: ICD-10-CM

## 2020-03-16 DIAGNOSIS — M45.8 ANKYLOSING SPONDYLITIS OF SACRAL REGION (H): ICD-10-CM

## 2020-03-16 PROCEDURE — 99214 OFFICE O/P EST MOD 30 MIN: CPT | Performed by: FAMILY MEDICINE

## 2020-03-16 RX ORDER — ALBUTEROL SULFATE 0.83 MG/ML
2.5 SOLUTION RESPIRATORY (INHALATION) EVERY 6 HOURS PRN
Qty: 200 ML | Refills: 3 | Status: SHIPPED | OUTPATIENT
Start: 2020-03-16 | End: 2021-05-16

## 2020-03-16 RX ORDER — TRAMADOL HYDROCHLORIDE 50 MG/1
50 TABLET ORAL 3 TIMES DAILY PRN
Qty: 30 TABLET | Refills: 0 | Status: SHIPPED | OUTPATIENT
Start: 2020-03-16 | End: 2020-05-01

## 2020-03-16 RX ORDER — OXYCODONE HYDROCHLORIDE 5 MG/1
5-10 TABLET ORAL EVERY 6 HOURS PRN
Qty: 35 TABLET | Refills: 0 | Status: SHIPPED | OUTPATIENT
Start: 2020-03-16 | End: 2020-04-03

## 2020-03-16 RX ORDER — CEFDINIR 300 MG/1
600 CAPSULE ORAL DAILY
Qty: 20 CAPSULE | Refills: 0 | Status: SHIPPED | OUTPATIENT
Start: 2020-03-16 | End: 2020-04-07

## 2020-03-16 RX ORDER — IPRATROPIUM BROMIDE 42 UG/1
2 SPRAY, METERED NASAL 3 TIMES DAILY PRN
Qty: 1 BOX | Refills: 0 | Status: SHIPPED | OUTPATIENT
Start: 2020-03-16 | End: 2020-05-01

## 2020-03-16 ASSESSMENT — MIFFLIN-ST. JEOR: SCORE: 2439.82

## 2020-03-16 NOTE — PATIENT INSTRUCTIONS
At [facility], we strive to deliver an exceptional experience to you, every time we see you. If you receive a survey, please complete it as we do value your feedback.  If you have MyChart, you can expect to receive results automatically within 24 hours of their completion.  Your provider will send a note interpreting your results as well.   If you do not have MyChart, you should receive your results in about a week by mail.    Your care team:     Family Medicine   SHERWIN Ruelas APRN CNP S. Matthew Hockett, MD Pamela Kolacz, MD Angela Wermerskirchen, MD    Internal Medicine  Kanu Knapp MD     Clinic hours: Monday - Wednesday 7 am-7 pm   Thursdays and Fridays 7 am-5 pm.     [facility]: Monday - Friday 11 am-9 pm,   Saturday and Sunday 9 am-5 pm.     [facility]: Monday - Thursday 8 am - 7 pm; Friday 8 am - 6 pm    Clinic: [phone]   [facility]: [phone]     Use [email or web address] for 24/7 diagnosis and treatment of dozens of conditions.

## 2020-03-16 NOTE — PROGRESS NOTES
Subjective     Joel Pineda is a 44 year old male who presents to clinic today for the following health issues:    HPI   Concern - Upper respiratory   Onset: ongoing     Description:   Patient sent symptoms through [facility]:    TWO QUESTIONS:  1) Breakthrough pain med Rx (Ultram vs. Oxycodone due to worsening (gastroparesis?) nausea and pain (as well as AS pain and sinus headaches)?).     2) Flu testing (Note: Flu shot #1 in 09/19, Flu shot #2 in 01/20)?     **Pulmonary ordered Albuterol neb today**     SYMPTOM TIMELINE:  Intermittent upper molar pain ~ one week prior to 03/11 dentist appointment, although I could breathe fine. Extremely fatigued.     03/11: Upon my query, the Dentist suspects sinus infection based upon dental x-ray shadows and my upper molar pain     03/11: Appt with Jing Priest. Low volume of sticky, cloudy white sputum. Feel feverish off-and-on, but normal temp. Sore neck and shoulders.     Started: Doxycycline Rx, Mucinex, Sudafed.     03/12: Cough is now dry. Intense headache, left ear pain, started using Ciprodex drops and Lotramin drops bilaterally BID (per Dr. Jimenez given prior DMARD side effects). Runny nose intensifies. Some eye itching. Increasing PND, sweating, and nausea. Started using Ventolin inhaler to quiet my coughing episodes.     03/13: New right ear pain. Intense right eye ache, some left eye ache. Eye itching is now a burning sensation. Copious PND.     03/14: Online [facility] appt. Dizziness. Recommended that I be seen in person.     03/15: Noticeable ear pain decrease, though left ear still relatively sore. Stools vary from diarrhea to constipated.     03/16: Ongoing PND, headache, dizziness, eye pain, off & on sweating, increasing cough (return to 03/11 sputum, but now with chest pressure...increased Ventolin usage), worsening sore throat. Occasional cold sweats and mild wheezing.      Therapies Tried and outcome: Course of anibiotics    SUBJECTIVE:  Here today in follow-up of  "ongoing symptoms with some details as above.  Patient well-known to me and history reviewed in his chart and with him.  Met with family about sinus symptoms and placed him on doxycycline.  Not sure it is really helping.  Still having some upper respiratory congestion and quite a bit of thin postnasal drip as well.  We discussed his question about possible flu testing but he is not feeling feverish, has had 2 different flu vaccines, and symptoms started long ago.  So this is not consistent with influenza and even if it was he is on the tail end and no specific treatment would be needed.  But we did discuss other ways to deal with some of his symptoms overall.  Has been having some trouble with gastroparesis and intermittently uses oxycodone for severe back issues.  Is aware that this can make gastroparesis worse wonders if tramadol would be better from that standpoint.  Has used tramadol in the past and tends to make him more drowsy however.    Review of systems otherwise negative.  Past medical, family, and social history reviewed and updated in chart.    OBJECTIVE:  /79   Pulse 79   Temp 98.7  F (37.1  C) (Oral)   Resp 20   Ht 1.956 m (6' 5\")   Wt 144.2 kg (318 lb)   SpO2 97%   BMI 37.71 kg/m    Alert, pleasant, upbeat, and in no apparent discomfort.  Ears normal. Throat and pharynx normal. Neck supple. No adenopathy or masses in the neck or supraclavicular regions. Sinuses non tender.  S1 and S2 normal, no murmurs, clicks, gallops or rubs. Regular rate and rhythm. Chest is clear; no wheezes or rales. No edema or JVD.  Past labs reviewed with the patient.     ASSESSMENT / PLAN:  (J01.00) Acute non-recurrent maxillary sinusitis  (primary encounter diagnosis)  Comment: Suggested switching to Omnicef.  Has had cephalosporins recently without any allergic reaction and I think this would be safe to try.  Plan: cefdinir (OMNICEF) 300 MG capsule            (R09.82) Postnasal drip  Comment: Try adding some " Atrovent nasal on a temporary basis to help with the rhinitis  Plan: ipratropium (ATROVENT) 0.06 % nasal spray            (K31.84) Gastroparesis  Comment: I will provide a small prescription of tramadol for him to try in place of oxycodone to see if this is better from that standpoint  Plan:     (M51.36) DDD (degenerative disc disease), lumbar  Comment: As above  Plan: oxyCODONE (ROXICODONE) 5 MG tablet, traMADol         (ULTRAM) 50 MG tablet            (M45.8) Ankylosing spondylitis of sacral region (H)  Comment: As above  Plan: oxyCODONE (ROXICODONE) 5 MG tablet, traMADol         (ULTRAM) 50 MG tablet            Follow up 1 week with  JA Lyles MD    (Chart documentation completed in part with Dragon voice-recognition software.  Even though reviewed some grammatical, spelling, and word errors may remain.)

## 2020-03-16 NOTE — TELEPHONE ENCOUNTER
Reviewed by MA and Dr. Lyles at 3/16/2020 visit.   Patient wanted to send symptoms via Mychart so can be noted in OV notes.     LXIONG3, MEDICAL ASSISTANT

## 2020-03-16 NOTE — TELEPHONE ENCOUNTER
Notified patient nebulizer prescriptions have been signed. Prescription for nebulizer was faxed to [facility].     Jones Alvarado RN   [facility] Care Coordinator  [facility]

## 2020-03-16 NOTE — TELEPHONE ENCOUNTER
"Requested Prescriptions   Pending Prescriptions Disp Refills     levothyroxine (SYNTHROID/LEVOTHROID) 75 MCG tablet 90 tablet 1     Sig: TAKE 1 TABLET EVERY MORNING       Thyroid Protocol Passed - 3/16/2020  7:29 AM        Passed - Patient is 12 years or older        Passed - Recent (12 mo) or future (30 days) visit within the authorizing provider's specialty     Patient has had an office visit with the authorizing provider or a provider within the authorizing providers department within the previous 12 mos or has a future within next 30 days. See \"Patient Info\" tab in inbasket, or \"Choose Columns\" in Meds & Orders section of the refill encounter.              Passed - Medication is active on med list        Passed - Normal TSH on file in past 12 months     Recent Labs   Lab Test 01/24/20  1637   TSH 2.17                 ramipril (ALTACE) 10 MG capsule 90 capsule 1     Sig: Take 1 capsule (10 mg) by mouth daily       ACE Inhibitors (Including Combos) Protocol Passed - 3/16/2020  7:29 AM        Passed - Blood pressure under 140/90 in past 12 months     BP Readings from Last 3 Encounters:   03/11/20 126/86   02/17/20 127/67   02/06/20 126/80                 Passed - Recent (12 mo) or future (30 days) visit within the authorizing provider's specialty     Patient has had an office visit with the authorizing provider or a provider within the authorizing providers department within the previous 12 mos or has a future within next 30 days. See \"Patient Info\" tab in inbasket, or \"Choose Columns\" in Meds & Orders section of the refill encounter.              Passed - Medication is active on med list        Passed - Patient is age 18 or older        Passed - Normal serum creatinine on file in past 12 months     Recent Labs   Lab Test 01/24/20  1637   CR 0.81       Ok to refill medication if creatinine is low          Passed - Normal serum potassium on file in past 12 months     Recent Labs   Lab Test 01/24/20  1637   POTASSIUM " 4.4                levothyroxine (SYNTHROID/LEVOTHROID) 75 MCG tablet  Last Written Prescription Date:  10/14/19  Last Fill Quantity: 90,  # refills: 1   Last office visit: 3/11/2020 with prescribing provider:  Dr. Lyles   Future Office Visit:   Next 5 appointments (look out 90 days)    Mar 16, 2020  5:40 PM CDT  [facility] [address] [place] [phone]   Mar 18, 2020 11:30 AM CDT  Telephone Visit with Ray Malone MD  [facility] ([facility]) [address] [place] [address] [phone]   Apr 22, 2020  2:40 PM CDT  Return Visit with Oneil Baxter MD  [facility]) [address] [place] [address] [phone]   Jun 11, 2020 11:00 AM CDT  Return Visit with Elaine Segovia MD  [facility] [address] [place] [phone]         ramipril (ALTACE) 10 MG capsule  Last Written Prescription Date:  12/16/19  Last Fill Quantity: 90,  # refills: 1   Last office visit: 3/11/2020 with prescribing provider:  Dr. Lyles   Future Office Visit:   Next 5 appointments (look out 90 days)    Mar 16, 2020  5:40 PM CDT  SHORT with Ksenia Lyles MD  [facility] [address] [place] [phone]   Mar 18, 2020 11:30 AM CDT  Telephone Visit with Ray Malone MD  [facility] ([facility]) [address] [place] [address] [phone]   Apr 22, 2020  2:40 PM CDT  Return Visit with Oneil Baxter MD  [facility] ([facility]) [address] [place] [phone]  [phone]   Jun 11, 2020 11:00 AM CDT  Return Visit with MD CHEY Sahu  [facility] ([facility]) [address] [address] [place] [phone]  [phone]

## 2020-03-16 NOTE — TELEPHONE ENCOUNTER
Already addressed by provider at today's visit.     Heather White RN  [facility]

## 2020-03-17 RX ORDER — LEVOTHYROXINE SODIUM 75 UG/1
TABLET ORAL
Qty: 90 TABLET | Refills: 1 | Status: SHIPPED | OUTPATIENT
Start: 2020-03-17 | End: 2020-08-23

## 2020-03-17 RX ORDER — RAMIPRIL 10 MG/1
10 CAPSULE ORAL DAILY
Qty: 90 CAPSULE | Refills: 1 | Status: SHIPPED | OUTPATIENT
Start: 2020-03-17 | End: 2020-08-30

## 2020-03-18 ENCOUNTER — DOCUMENTATION ONLY (OUTPATIENT)
Dept: NEUROLOGY | Facility: CLINIC | Age: 45
End: 2020-03-18

## 2020-03-18 ENCOUNTER — VIRTUAL VISIT (OUTPATIENT)
Dept: NEUROLOGY | Facility: CLINIC | Age: 45
End: 2020-03-18
Payer: MEDICARE

## 2020-03-18 DIAGNOSIS — R42 ORTHOSTATIC DIZZINESS: Primary | ICD-10-CM

## 2020-03-18 ASSESSMENT — PAIN SCALES - GENERAL: PAINLEVEL: MILD PAIN (3)

## 2020-03-18 NOTE — PATIENT INSTRUCTIONS
Assessment/Plan:  1.  Mild distal sensorimotor peripheral neuropathy, likely secondary to diabetes mellitus  2.  Mild autonomic neuropathy, most likely secondary to diabetes mellitus  3.  POTS, likely secondary to deconditioning    Recommendations:  1.  Gradually increasing exercise starting at a low baseline of even 100 or 200 m of walking and increasing extremely slowly by perhaps 5200 mm of walking per week.  2.  Prescription sent for thigh-high Jobst hose 30 to 40 mm of water  3.  Continue fluid expansion  4.  Continue beta-blockade  5.  Strong reassurance

## 2020-03-18 NOTE — PROGRESS NOTES
"Joel Pineda is a 44 year old male who is being evaluated via a billable telephone visit.      The patient has been notified of following:     \"This telephone visit will be conducted via a call between you and your physician/provider. We have found that certain health care needs can be provided without the need for a physical exam.  This service lets us provide the care you need with a short phone conversation.  If a prescription is necessary we can send it directly to your pharmacy.  If lab work is needed we can place an order for that and you can then stop by our lab to have the test done at a later time.    If during the course of the call the physician/provider feels a telephone visit is not appropriate, you will not be charged for this service.\"       Joel Pineda complains of    Chief Complaint   Patient presents with     telephone visit     telephone visit return movemnt disorder        I have reviewed and updated the patient's Past Medical History, Social History, Family History and Medication List.    ALLERGIES  Amoxicillin-pot clavulanate; Banana; Nitroglycerin; Penicillins; Provigil [modafinil]; Gadolinium; Ketoconazole; Dye [contrast dye]; Golimumab; Neurontin [gabapentin]; Nortriptyline; Varicella virus vaccine live; Flagyl [metronidazole hcl]; Latex; Metronidazole; and No clinical screening - see comments        Additional provider notes:     I changed the patient's scheduled in-person visit to a televisit because of the COVID19 crisis.  The rationale was to protect the patient from unnecessary interpersonal contact.    Mr. Joel Pineda is a 44-year-old gentleman who I had been following for mild distal axonal peripheral neuropathy.  He complained of acral paresthesias.  An EMG done by Dr. Evan Santamaria on March 22 of 2019 demonstrated loss of the left sural response and a low amplitude response on the right.  He had a slowed peroneal conduction velocity on motor testing on the right side.  Needle " exam was normal.  It was felt he had a mild distal sensory neuropathy.  The only cause for this that could be seen found recent onset diabetes mellitus.  We counseled him that tight diabetic control would be the best thing available for his neuropathy.    He was recently found to have a gastric emptying disorder (gastroparesis).  He wanted to have autonomic testing to see the degree of autonomic dysfunction that was present.  This autonomic test was done on 2/25/2020 at [facility].  I reviewed the results with him.  The axon sweating was normal at all sites.  However it was felt that perhaps it was reduced in amplitude in the foot relative to other sites.  This was not a definite abnormality.  Heart rate responses to deep breathing were reduced although the responses to Valsalva were normal.  Blood pressure responses to Valsalva and maneuver revealed reduced phase 2L and 4 with delayed pressure recovery.  The patient also had evidence for POTS.  His pulse accelerated to greater than 30 bpm in the head up position with feelings of orthostatic intolerance.  His blood pressure was normal however.    I went over these findings with Mr. Pineda.  I told him it was evidence for mild dysautonomia.  It is likely a mild autonomic neuropathy associated with his glucose intolerance.  Mr. Pineda has done an excellent job of controlling his blood sugar with a recent hemoglobin A1c below 6.  I told him that the POTS may or may not be related to his autonomic neuropathy.  POTS is a varied and complex disorder.  Oftentimes the most common precipitant is deconditioning.    We discussed that the best approach would be mildly increasing gradual exercise starting from a low baseline.  The patient is already practicing fluid expansion with large amounts of fluid and salt.  He is already on high levels of beta-blocker.  He would like to try thigh-high Jobst stockings, 30 to 40 mm of water, and he already has an abdominal  binder-type apparel.  I will send him a prescription for the Jobst hose.    We discussed the situation in detail.  He was able to ask all questions.  For follow-up I recommended that he continue to see the general neurologist at [facility].  I again stressed to him that his degree of dysautonomia was very mild.            Assessment/Plan:  1.  Mild distal sensorimotor peripheral neuropathy, likely secondary to diabetes mellitus  2.  Mild autonomic neuropathy, most likely secondary to diabetes mellitus  3.  POTS, likely secondary to deconditioning    Recommendations:  1.  Gradually increasing exercise starting at a low baseline of even 100 or 200 m of walking and increasing extremely slowly by perhaps 5200 mm of walking per week.  2.  Prescription sent for thigh-high Jobst hose 30 to 40 mm of water  3.  Continue fluid expansion  4.  Continue beta-blockade  5.  Strong reassurance      I have reviewed the note as documented above.  This accurately captures the substance of my conversation with the patient.    Ray Malone MD      Phone call contact time  Call Started at 11:25  Call Ended at 11:46      Answers for HPI/ROS submitted by the patient on 3/13/2020   General Symptoms: Yes  Skin Symptoms: Yes  HENT Symptoms: Yes  EYE SYMPTOMS: Yes  HEART SYMPTOMS: Yes  LUNG SYMPTOMS: No  INTESTINAL SYMPTOMS: Yes  URINARY SYMPTOMS: Yes  REPRODUCTIVE SYMPTOMS: Yes  SKELETAL SYMPTOMS: Yes  BLOOD SYMPTOMS: No  NERVOUS SYSTEM SYMPTOMS: Yes  MENTAL HEALTH SYMPTOMS: Yes  Fever: No  Loss of appetite: Yes  Weight loss: Yes  Weight gain: No  Fatigue: Yes  Night sweats: Yes  Chills: Yes  Increased stress: Yes  Excessive hunger: No  Excessive thirst: Yes  Feeling hot or cold when others believe the temperature is normal: Yes  Loss of height: No  Post-operative complications: No  Surgical site pain: No  Hallucinations: No  Change in or Loss of Energy: Yes  Hyperactivity: Yes  Confusion: Yes  Changes in hair: No  Changes in  moles/birth marks: No  Itching: No  Rashes: No  Changes in nails: Yes  Acne: No  Change in facial hair: No  Warts: No  Non-healing sores: No  Scarring: No  Flaking of skin: Yes  Color changes of hands/feet in cold : No  Sun sensitivity: No  Skin thickening: No  Ear pain: Yes  Ear discharge: No  Hearing loss: No  Tinnitus: No  Nosebleeds: No  Congestion: Yes  Sinus pain: No  Trouble swallowing: No   Voice hoarseness: No  Mouth sores: No  Sore throat: No  Tooth pain: Yes  Gum tenderness: No  Bleeding gums: No  Change in taste: No  Change in sense of smell: No  Dry mouth: Yes  Hearing aid used: No  Neck lump: No  Eye pain: Yes  Vision loss: No  Dry eyes: Yes  Watery eyes: No  Eye bulging: No  Double vision: Yes  Flashing of lights: Yes  Spots: Yes  Floaters: No  Redness: Yes  Crossed eyes: No  Tunnel Vision: No  Yellowing of eyes: No  Eye irritation: Yes  Chest pain or pressure: Yes  Fast or irregular heartbeat: Yes  Pain in legs with walking: Yes  Trouble breathing while lying down: No  Fingers or toes appear blue: No  High blood pressure: Yes  Low blood pressure: No  Fainting: No  Murmurs: No  Pacemaker: No  Varicose veins: No  Edema or swelling: No  Wake up at night with shortness of breath: No  Light-headedness: Yes  Heart burn or indigestion: Yes  Nausea: Yes  Vomiting: No  Abdominal pain: Yes  Bloating: Yes  Constipation: Yes  Diarrhea: Yes  Blood in stool: No  Black stools: No  Rectal or Anal pain: Yes  Fecal incontinence: No  Yellowing of skin or eyes: No  Vomit with blood: No  Trouble holding urine or incontinence: No  Pain or burning: No  Trouble starting or stopping: Yes  Increased frequency of urination: Yes  Blood in urine: No  Decreased frequency of urination: No  Frequent nighttime urination: Yes  Flank pain: No  Back pain: Yes  Muscle aches: Yes  Neck pain: Yes  Swollen joints: No  Joint pain: Yes  Bone pain: No  Muscle cramps: No  Muscle weakness: No  Joint stiffness: Yes  Bone fracture: No  Trouble  with coordination: Yes  Dizziness or trouble with balance: Yes  Fainting or black-out spells: No  Memory loss: Yes  Headache: Yes  Seizures: No  Speech problems: No  Tingling: Yes  Tremor: No  Weakness: No  Difficulty walking: No  Paralysis: No  Numbness: Yes  Scrotal pain or swelling: No  Erectile dysfunction: Yes  Penile discharge: No  Genital ulcers: No  Reduced libido: Yes  Nervous or Anxious: Yes  Depression: Yes  Trouble sleeping: Yes  Trouble thinking or concentrating: Yes  Mood changes: Yes  Panic attacks: No

## 2020-03-18 NOTE — PROGRESS NOTES
Telephone visit return movement disorder.  Pain level a 3 for low back and a 4 for sinus headache pt has the Flu and saw his PCP yesterday 3/17/20     Gabriela Peacock cma   Answers for HPI/ROS submitted by the patient on 3/13/2020   General Symptoms: Yes  Skin Symptoms: Yes  HENT Symptoms: Yes  EYE SYMPTOMS: Yes  HEART SYMPTOMS: Yes  LUNG SYMPTOMS: No  INTESTINAL SYMPTOMS: Yes  URINARY SYMPTOMS: Yes  REPRODUCTIVE SYMPTOMS: Yes  SKELETAL SYMPTOMS: Yes  BLOOD SYMPTOMS: No  NERVOUS SYSTEM SYMPTOMS: Yes  MENTAL HEALTH SYMPTOMS: Yes  Fever: No  Loss of appetite: Yes  Weight loss: Yes  Weight gain: No  Fatigue: Yes  Night sweats: Yes  Chills: Yes  Increased stress: Yes  Excessive hunger: No  Excessive thirst: Yes  Feeling hot or cold when others believe the temperature is normal: Yes  Loss of height: No  Post-operative complications: No  Surgical site pain: No  Hallucinations: No  Change in or Loss of Energy: Yes  Hyperactivity: Yes  Confusion: Yes  Changes in hair: No  Changes in moles/birth marks: No  Itching: No  Rashes: No  Changes in nails: Yes  Acne: No  Change in facial hair: No  Warts: No  Non-healing sores: No  Scarring: No  Flaking of skin: Yes  Color changes of hands/feet in cold : No  Sun sensitivity: No  Skin thickening: No  Ear pain: Yes  Ear discharge: No  Hearing loss: No  Tinnitus: No  Nosebleeds: No  Congestion: Yes  Sinus pain: No  Trouble swallowing: No   Voice hoarseness: No  Mouth sores: No  Sore throat: No  Tooth pain: Yes  Gum tenderness: No  Bleeding gums: No  Change in taste: No  Change in sense of smell: No  Dry mouth: Yes  Hearing aid used: No  Neck lump: No  Eye pain: Yes  Vision loss: No  Dry eyes: Yes  Watery eyes: No  Eye bulging: No  Double vision: Yes  Flashing of lights: Yes  Spots: Yes  Floaters: No  Redness: Yes  Crossed eyes: No  Tunnel Vision: No  Yellowing of eyes: No  Eye irritation: Yes  Chest pain or pressure: Yes  Fast or irregular heartbeat: Yes  Pain in legs with walking:  Yes  Trouble breathing while lying down: No  Fingers or toes appear blue: No  High blood pressure: Yes  Low blood pressure: No  Fainting: No  Murmurs: No  Pacemaker: No  Varicose veins: No  Edema or swelling: No  Wake up at night with shortness of breath: No  Light-headedness: Yes  Heart burn or indigestion: Yes  Nausea: Yes  Vomiting: No  Abdominal pain: Yes  Bloating: Yes  Constipation: Yes  Diarrhea: Yes  Blood in stool: No  Black stools: No  Rectal or Anal pain: Yes  Fecal incontinence: No  Yellowing of skin or eyes: No  Vomit with blood: No  Trouble holding urine or incontinence: No  Pain or burning: No  Trouble starting or stopping: Yes  Increased frequency of urination: Yes  Blood in urine: No  Decreased frequency of urination: No  Frequent nighttime urination: Yes  Flank pain: No  Back pain: Yes  Muscle aches: Yes  Neck pain: Yes  Swollen joints: No  Joint pain: Yes  Bone pain: No  Muscle cramps: No  Muscle weakness: No  Joint stiffness: Yes  Bone fracture: No  Trouble with coordination: Yes  Dizziness or trouble with balance: Yes  Fainting or black-out spells: No  Memory loss: Yes  Headache: Yes  Seizures: No  Speech problems: No  Tingling: Yes  Tremor: No  Weakness: No  Difficulty walking: No  Paralysis: No  Numbness: Yes  Scrotal pain or swelling: No  Erectile dysfunction: Yes  Penile discharge: No  Genital ulcers: No  Reduced libido: Yes  Nervous or Anxious: Yes  Depression: Yes  Trouble sleeping: Yes  Trouble thinking or concentrating: Yes  Mood changes: Yes  Panic attacks: No

## 2020-03-22 ENCOUNTER — NURSE TRIAGE (OUTPATIENT)
Dept: NURSING | Facility: CLINIC | Age: 45
End: 2020-03-22

## 2020-03-23 ENCOUNTER — TELEPHONE (OUTPATIENT)
Dept: NEUROLOGY | Facility: CLINIC | Age: 45
End: 2020-03-23

## 2020-03-23 ENCOUNTER — MYC MEDICAL ADVICE (OUTPATIENT)
Dept: FAMILY MEDICINE | Facility: CLINIC | Age: 45
End: 2020-03-23

## 2020-03-23 NOTE — TELEPHONE ENCOUNTER
[facility]    Phone Message    May a detailed message be left on voicemail: yes     Reason for Call: Other: Esmer calling to get an order placed in [facility] or faxed to her so that they can fill and send the pt his Rx for compression stockings. Please either place this order in [facility] or fax to [phone]. Please call Will back if needed.     Action Taken: Message routed to:  [facility] neuology    Travel Screening: Not Applicable

## 2020-03-23 NOTE — TELEPHONE ENCOUNTER
"Seen by neurologist: DX with mild autonomic neuropathy, gastroparesis and POTS. He is dizzy, trying to drink more fluids. He has Reglan, last taken @ 5:30pm and states he could take more, and has been trying to drink home made Pedialyte. He is uncomfortable--dizzy when standing. He usually gets sweaty, but is not sweating. P=90 during call.   He has not gotten his compression stockings yet. No edema noted in legs. He has ace bandages, so he can wrap up his legs. HA pain=\"3-4\", hx of migraine and has Maxalt if he needs it.  Type 2 Diabetic. He has eaten today, and had ensure protein drinks. RX Metformin. He will check his blood sugar now.   He is wondering if he needs to go to the ER?  Triaged to a disposition of See PCP within 3 days. He will call his clinic in am for an appointment. He will try home care for now and call us back if sx are worsening.     Reason for Disposition    [1] MODERATE dizziness (e.g., interferes with normal activities) AND [2] has been evaluated by physician for this    Additional Information    Negative: Severe difficulty breathing (e.g., struggling for each breath, speaks in single words)    Negative: [1] Difficulty breathing or swallowing AND [2] started suddenly after medicine, an allergic food or bee sting    Negative: Shock suspected (e.g., cold/pale/clammy skin, too weak to stand, low BP, rapid pulse)    Negative: Difficult to awaken or acting confused (e.g., disoriented, slurred speech)    Negative: [1] Weakness (i.e., paralysis, loss of muscle strength) of the face, arm or leg on one side of the body AND [2] sudden onset AND [3] present now    Negative: [1] Numbness (i.e., loss of sensation) of the face, arm or leg on one side of the body AND [2] sudden onset AND [3] present now    Negative: [1] Loss of speech or garbled speech AND [2] sudden onset AND [3] present now    Negative: Overdose (accidental or intentional) of medications    Negative: [1] Fainted > 15 minutes ago AND [2] " "still feels too weak or dizzy to stand    Negative: Heart beating < 50 beats per minute OR > 140 beats per minute    Negative: Sounds like a life-threatening emergency to the triager    Negative: Chest pain    Negative: Rectal bleeding, bloody stool, or tarry-black stool    Negative: [1] Vomiting AND [2] contains red blood or black (\"coffee ground\") material    Negative: Vomiting is main symptom    Negative: Diarrhea is main symptom    Negative: Headache is main symptom    Negative: Patient states that he/she is having an anxiety/panic attack    Negative: Dizziness from low blood sugar (i.e., < 60 mg/dl or 3.5 mmol/l)    Negative: Dizziness is described as a spinning sensation (i.e., vertigo)    Negative: Heat exhaustion suspected (i.e., dehydration from heat exposure)    Negative: Difficulty breathing    Negative: SEVERE dizziness (e.g., unable to stand, requires support to walk, feels like passing out now)    Negative: Extra heart beats OR irregular heart beating  (i.e., \"palpitations\")    Negative: [1] Drinking very little AND [2] dehydration suspected (e.g., no urine > 12 hours, very dry mouth, very lightheaded)    Negative: Patient sounds very sick or weak to the triager    Negative: [1] Dizziness caused by heat exposure, sudden standing, or poor fluid intake AND [2] no improvement after 2 hours of rest and fluids    Negative: [1] Fever > 103 F (39.4 C) AND [2] not able to get the fever down using Fever Care Advice    Negative: [1] Fever > 101 F (38.3 C) AND [2] age > 60    Negative: [1] Fever > 100.0 F (37.8 C) AND [2] bedridden (e.g., nursing home patient, CVA, chronic illness, recovering from surgery)    Negative: [1] Fever > 100.0 F (37.8 C) AND [2] diabetes mellitus or weak immune system (e.g., HIV positive, cancer chemo, splenectomy, chronic steroids)    Negative: [1] MODERATE dizziness (e.g., interferes with normal activities) AND [2] has NOT been evaluated by physician for this  (Exception: dizziness " caused by heat exposure, sudden standing, or poor fluid intake)    Negative: Fever present > 3 days (72 hours)    Negative: Taking a medicine that could cause dizziness (e.g., blood pressure medications, diuretics)    Protocols used: DIZZINESS - [id number]

## 2020-03-24 ENCOUNTER — VIRTUAL VISIT (OUTPATIENT)
Dept: FAMILY MEDICINE | Facility: CLINIC | Age: 45
End: 2020-03-24
Payer: MEDICARE

## 2020-03-24 DIAGNOSIS — B37.42 CANDIDAL BALANITIS: Primary | ICD-10-CM

## 2020-03-24 DIAGNOSIS — G90.A POTS (POSTURAL ORTHOSTATIC TACHYCARDIA SYNDROME): ICD-10-CM

## 2020-03-24 PROCEDURE — G2012 BRIEF CHECK IN BY MD/QHP: HCPCS | Performed by: FAMILY MEDICINE

## 2020-03-24 RX ORDER — FLUCONAZOLE 200 MG/1
200 TABLET ORAL DAILY
Qty: 3 TABLET | Refills: 0 | Status: SHIPPED | OUTPATIENT
Start: 2020-03-24 | End: 2020-04-07

## 2020-03-24 RX ORDER — CLOTRIMAZOLE 1 %
CREAM (GRAM) TOPICAL 2 TIMES DAILY
COMMUNITY
End: 2020-03-31

## 2020-03-24 NOTE — PROGRESS NOTES
"Joel Pineda is a 44 year old male who is being evaluated via a billable telephone visit.      The patient has been notified of following:     \"This telephone visit will be conducted via a call between you and your physician/provider. We have found that certain health care needs can be provided without the need for a physical exam.  This service lets us provide the care you need with a short phone conversation.  If a prescription is necessary we can send it directly to your pharmacy.  If lab work is needed we can place an order for that and you can then stop by our lab to have the test done at a later time.    If during the course of the call the physician/provider feels a telephone visit is not appropriate, you will not be charged for this service.\"     Joel Pineda complains of    Chief Complaint   Patient presents with     Male  Problem     Patient would like to discuss Yeast Infection and refills.     I have reviewed and updated the patient's Past Medical History, Social History, Family History and Medication List.    ALLERGIES  Amoxicillin-pot clavulanate; Banana; Nitroglycerin; Penicillins; Provigil [modafinil]; Gadolinium; Ketoconazole; Dye [contrast dye]; Golimumab; Neurontin [gabapentin]; Nortriptyline; Varicella virus vaccine live; Flagyl [metronidazole hcl]; Latex; Metronidazole; and No clinical screening - see comments      Additional provider notes:   Spoke with patient via phone regarding the return of some yeast symptoms to the tip of his penis.  He first experienced this last year after course of antibiotics and saw Dr. Renteria.  She gave him a prescription for Lotrisone cream which cleared things up.  We recently used a course of antibiotics for sinus symptoms which has resolved but then he broke out in the rash.  Started using the cream but it still somewhat swollen and irritated.  I discussed that there is some risk in using stronger steroids to a sensitive skin in that area which is included " in the Lotrisone.  I think at this point we want to get this cleared up and stop using the combination.  Just single agent cream in the future.    Reviewed recent neurology visits and he has a working diagnosis of POTS.  Will be wearing compression stockings and working on volume expansion with salt.    Assessment/Plan:  1. Candidal balanitis  3-day course of oral fluconazole once things have cleared stop using the combination topical.  - fluconazole (DIFLUCAN) 200 MG tablet; Take 1 tablet (200 mg) by mouth daily for 3 days  Dispense: 3 tablet; Refill: 0    2. POTS (postural orthostatic tachycardia syndrome)  We will continue to follow along      Phone call duration:  8 minutes    Ksenia Lyles MD

## 2020-03-25 ENCOUNTER — TELEPHONE (OUTPATIENT)
Dept: PHARMACY | Facility: CLINIC | Age: 45
End: 2020-03-25

## 2020-03-25 NOTE — TELEPHONE ENCOUNTER
Patient left voicemail requesting an earlier MTM appointment due to new diagnosis. Returned patient's call and LM for patient to call back.    Radha Mcdonald, Pharm.D, BCACP  Medication Therapy Management Pharmacist

## 2020-03-31 VITALS — WEIGHT: 315 LBS | HEIGHT: 78 IN | BODY MASS INDEX: 36.45 KG/M2

## 2020-03-31 ASSESSMENT — MIFFLIN-ST. JEOR: SCORE: 2442.08

## 2020-03-31 NOTE — PATIENT INSTRUCTIONS
Your BMI is Body mass index is 36.4 kg/m .  Weight management is a personal decision.  If you are interested in exploring weight loss strategies, the following discussion covers the approaches that may be successful. Body mass index (BMI) is one way to tell whether you are at a healthy weight, overweight, or obese. It measures your weight in relation to your height.  A BMI of 18.5 to 24.9 is in the healthy range. A person with a BMI of 25 to 29.9 is considered overweight, and someone with a BMI of 30 or greater is considered obese. More than two-thirds of American adults are considered overweight or obese.  Being overweight or obese increases the risk for further weight gain. Excess weight may lead to heart disease and diabetes.  Creating and following plans for healthy eating and physical activity may help you improve your health.  Weight control is part of healthy lifestyle and includes exercise, emotional health, and healthy eating habits. Careful eating habits lifelong are the mainstay of weight control. Though there are significant health benefits from weight loss, long-term weight loss with diet alone may be very difficult to achieve- studies show long-term success with dietary management in less than 10% of people. Attaining a healthy weight may be especially difficult to achieve in those with severe obesity. In some cases, medications, devices and surgical management might be considered.  What can you do?  If you are overweight or obese and are interested in methods for weight loss, you should discuss this with your provider.     Consider reducing daily calorie intake by 500 calories.     Keep a food journal.     Avoiding skipping meals, consider cutting portions instead.    Diet combined with exercise helps maintain muscle while optimizing fat loss. Strength training is particularly important for building and maintaining muscle mass. Exercise helps reduce stress, increase energy, and improves fitness.  Increasing exercise without diet control, however, may not burn enough calories to loose weight.       Start walking three days a week 10-20 minutes at a time    Work towards walking thirty minutes five days a week     Eventually, increase the speed of your walking for 1-2 minutes at time    In addition, we recommend that you review healthy lifestyles and methods for weight loss available through the National Institutes of Health patient information sites:  http://win.niddk.nih.gov/publications/index.htm    And look into health and wellness programs that may be available through your health insurance provider, employer, local community center, or scar club.    Weight management plan: Patient was referred to their PCP to discuss a diet and exercise plan.

## 2020-03-31 NOTE — PROGRESS NOTES
"COVID-19 screening done    Joel Pineda is a 44 year old male who is being evaluated via a billable video visit.      The patient has been notified of following:     \"This video visit will be conducted via a call between you and your physician/provider. We have found that certain health care needs can be provided without the need for an in-person physical exam.  This service lets us provide the care you need with a video conversation.  If a prescription is necessary we can send it directly to your pharmacy.  If lab work is needed we can place an order for that and you can then stop by our lab to have the test done at a later time.    If during the course of the call the physician/provider feels a video visit is not appropriate, you will not be charged for this service.\"     Patient has given verbal consent for Video visit? Yes    Patient would like the video invitation sent by: Dr Damico    Video Start Time: 11:00    Joel Pineda complains of    Chief Complaint   Patient presents with     CPAP Follow Up     Discuss bipap, violent sleep disturbances       I have reviewed and updated the patient's Past Medical History, Social History, Family History and Medication List.    ALLERGIES  Amoxicillin-pot clavulanate; Banana; Nitroglycerin; Penicillins; Provigil [modafinil]; Gadolinium; Ketoconazole; Dye [contrast dye]; Golimumab; Neurontin [gabapentin]; Nortriptyline; Varicella virus vaccine live; Flagyl [metronidazole hcl]; Latex; Metronidazole; and No clinical screening - see comments    Additional provider notes:   -LLOYD and Dream enactment concerning for RBD    LLOYD under good control.  AHI is < 5.  Uses mask every night.  Feels refreshed has no concerns.  Would like new supplies.     Dream enactment with injury.  Dreaming he was hiding under a ledge jump down and realized he hit his eyebrow.  Ended up needing 4 stiches.      MANUELITO going on now for over 1 year.  Gotten worse with cymbalta but has been otherwise doing " very well with that medication. Is very leary to change it.      + constipation, neuropathy.  Recently diagnosed with WRIGHT.         Assessment/Plan:  LLOYD under good control.  Will continue to use  Encouraged adherance.  Will order new supplies and hold off on new machine until repeat PSG.     Dream enactment likely RBD.  Very concerning for injury and in light of his constipation and neuropathy raise question of MSA.  We will evaluate with a in lab PSG once COVID 19 pandemic passes.      Will encourage bedroom safety, avoid firearms, continue with bed rails. Increase melatonin to 6mg po at bedtime.  Will call with any worsening behaviors.      We discussed the importance of never driving if tired or sleepy.          Video-Visit Details    Type of service:  Video Visit    Video End Time (time video stopped): 11:45    Originating Location (pt. Location): Home    Distant Location (provider location):  [place] [facility]     Mode of Communication:  Video Conference via a [facility] compliant teleconferencing portal (Discussed with the patient and patient consented to using this form of communication).       No Damico MD

## 2020-04-01 ENCOUNTER — VIRTUAL VISIT (OUTPATIENT)
Dept: SLEEP MEDICINE | Facility: CLINIC | Age: 45
End: 2020-04-01
Payer: MEDICARE

## 2020-04-01 DIAGNOSIS — G47.52 RBD (REM BEHAVIORAL DISORDER): Primary | ICD-10-CM

## 2020-04-01 DIAGNOSIS — Z20.822 COVID-19 RULED OUT: ICD-10-CM

## 2020-04-01 DIAGNOSIS — G47.33 OSA (OBSTRUCTIVE SLEEP APNEA): ICD-10-CM

## 2020-04-01 PROCEDURE — 99215 OFFICE O/P EST HI 40 MIN: CPT | Mod: 95 | Performed by: PSYCHIATRY & NEUROLOGY

## 2020-04-03 ENCOUNTER — OFFICE VISIT (OUTPATIENT)
Dept: FAMILY MEDICINE | Facility: CLINIC | Age: 45
End: 2020-04-03
Payer: MEDICARE

## 2020-04-03 ENCOUNTER — NURSE TRIAGE (OUTPATIENT)
Dept: FAMILY MEDICINE | Facility: CLINIC | Age: 45
End: 2020-04-03

## 2020-04-03 VITALS
DIASTOLIC BLOOD PRESSURE: 81 MMHG | BODY MASS INDEX: 36.45 KG/M2 | SYSTOLIC BLOOD PRESSURE: 126 MMHG | RESPIRATION RATE: 19 BRPM | HEART RATE: 63 BPM | WEIGHT: 315 LBS | HEIGHT: 78 IN | TEMPERATURE: 97.4 F | OXYGEN SATURATION: 100 %

## 2020-04-03 DIAGNOSIS — M51.369 DDD (DEGENERATIVE DISC DISEASE), LUMBAR: ICD-10-CM

## 2020-04-03 DIAGNOSIS — S76.211A GROIN STRAIN, RIGHT, INITIAL ENCOUNTER: Primary | ICD-10-CM

## 2020-04-03 DIAGNOSIS — M45.8 ANKYLOSING SPONDYLITIS OF SACRAL REGION (H): ICD-10-CM

## 2020-04-03 PROCEDURE — 99214 OFFICE O/P EST MOD 30 MIN: CPT | Performed by: FAMILY MEDICINE

## 2020-04-03 RX ORDER — OXYCODONE HYDROCHLORIDE 5 MG/1
5-10 TABLET ORAL EVERY 6 HOURS PRN
Qty: 35 TABLET | Refills: 0 | Status: SHIPPED | OUTPATIENT
Start: 2020-04-03 | End: 2020-04-24

## 2020-04-03 ASSESSMENT — PAIN SCALES - GENERAL: PAINLEVEL: MILD PAIN (3)

## 2020-04-03 ASSESSMENT — MIFFLIN-ST. JEOR: SCORE: 2442.08

## 2020-04-03 NOTE — TELEPHONE ENCOUNTER
S-(situation): Patient has had previous testicle pain but it came back and is worse than previously.     B-(background): Started about 2-3 weeks ago but worse this week. Has had this in the past but pain is different and worse this time. Had and ultrasound last June and no issues were found. Also making leg neuropathy worse.     A-(assessment): Intermittent pain. Denies fever, trauma to the area, problem with urination or bowel movements    R-(recommendations): Be seen in clinic as the pain has worsened and more frequently and is different than previously.     Additional Information    Negative: Rash or color change of scrotum BUT no swelling or pain    Negative: Followed a genital injury (e.g., penis, scrotum)    Negative: Swelling of scrotum is main symptom    Negative: Patient sounds very sick or weak to the triager    Pain comes and goes (intermittent) and present > 24 hours    Negative: Scrotum looks infected (e.g., draining sore, ulcer, red rash)    Negative: SEVERE pain (e.g., excruciating)    Negative: Swollen scrotum    Negative: Constant pain in scrotum or testicle and present > 1 hour    Negative: Vomiting    Negative: Fever > 100.5 F (38.1 C)    Protocols used: SCROTAL PAIN-A-OH             Martell Chaves RN, BSN, PHN

## 2020-04-03 NOTE — PROGRESS NOTES
"Subjective     Joel Pineda is a 44 year old male who presents to clinic today for the following health issues:    HPI   Concern - Testicular Pain  Onset: ongoing pain    Description:   Right testicle, sharp, pinching, intense dull ache    Intensity: 3/10    Progression of Symptoms:  worsening    Accompanying Signs & Symptoms:  Recent yeast infection, 3 red dots    Previous history of similar problem:   Yes    Alleviating factors:  Improved by: none    Therapies Tried and outcome: ice    SUBJECTIVE:  Here today for groin symptoms as above.  No specific injury the patient said he has been doing some spring cleaning and moving around some heavy boxes.  Has been on and off for the past 1 to 2 weeks.  Pain seems to be in his right groin all the way down to the right testicle.  Cannot find one specific area that is more sore than others.  No urinary or ejaculatory symptoms whatsoever.  Is not feeling a bulge.  Very similar to pain he had last summer and he was seen at the [facility] emergency department.  Testicular ultrasound was normal at that time.  Lasted a week or 2 and then went away.  Longstanding back history that is well-known to me and he has been noting a little more numbness into his right heel but no weakness or overall tingling.    Review of systems otherwise negative.  Past medical, family, and social history reviewed and updated in chart.    OBJECTIVE:  /81 (BP Location: Right arm, Patient Position: Chair, Cuff Size: Adult Large)   Pulse 63   Temp 97.4  F (36.3  C) (Oral)   Resp 19   Ht 1.981 m (6' 6\")   Wt 142.9 kg (315 lb)   SpO2 100%   BMI 36.40 kg/m    Alert, pleasant, upbeat, and in no apparent discomfort.  S1 and S2 normal, no murmurs, clicks, gallops or rubs. Regular rate and rhythm. Chest is clear; no wheezes or rales. No edema or JVD.   -normal penis and testes bilaterally.  No enlargement of the right testicle or the spermatic cord.  Not tender in the epididymis on the " right.  No bulges noted into the inguinal canal.  Past labs reviewed with the patient.     ASSESSMENT / PLAN:  (S76.704A) Groin strain, right, initial encounter  (primary encounter diagnosis)  Comment: I suspect this is a strain of the musculature into the groin and we discussed how the inner abdominal musculature runs contiguous with the cremaster muscle and pain can be referred up and down the chain.  I would treat this as a soft tissue muscular injury with ice and/or heat, relative rest, and analgesia.  We will temporarily change from Celebrex to Relafen and see if that gives him a little bit of a boost.  Plan: nabumetone (RELAFEN) 750 MG tablet            (M51.36) DDD (degenerative disc disease), lumbar  Comment:   Plan: oxyCODONE (ROXICODONE) 5 MG tablet            (M45.8) Ankylosing spondylitis of sacral region (H)  Comment:   Plan: oxyCODONE (ROXICODONE) 5 MG tablet            Follow up 1 week with progress  S. Shady Lyles MD    (Chart documentation completed in part with Dragon voice-recognition software.  Even though reviewed some grammatical, spelling, and word errors may remain.)

## 2020-04-05 ENCOUNTER — MYC MEDICAL ADVICE (OUTPATIENT)
Dept: FAMILY MEDICINE | Facility: CLINIC | Age: 45
End: 2020-04-05

## 2020-04-06 NOTE — PROGRESS NOTES
SUBJECTIVE/OBJECTIVE:                           Joel Pineda is a 44 year old male called for a follow up visit for Medication Therapy Management.  He was referred to me from Ksenia Lyles.     Chief Complaint: Glycopyrrolate prescribed for hyperhydrosis; changed from Humira to Enbrel; POTS    Allergies/ADRs: Reviewed in Epic  Tobacco: No tobacco use  Alcohol: none  Caffeine: not assessed today  Activity: patient will start exercising, pool and walking track  PMH: Reviewed in Epic    Patient does not need any refills on medications and doesn't have any barriers getting to the pharmacy to  his medications.    Groin Pain: NSAID was changed from celebrex to nabumetone 750mg twice daily as needed. Patient reports this has been helpful for the pain and then he will go back to celebrex.     Gastroparesis: patient was recently diagnosed with gastroparesis. Patient finds metoclopramide 5mg effective. Patient takes twice daily.  Patient reports this has been better than it has been in the past.    Allergies: current therapy includes ketotifen one drop into both eyes twice daily. Patient reports it took a few days before it started working.    Ankylosing Spondylitis: Current medications include Enbrel 50mg weekly. Patient was switched from Humira to Enbrel because of headaches and burning mouth. Patient follows with Dr. Baxter, Rheumatology. Patient reports he has been getting headaches with the injections so he premedicates with acetaminophen. Patient has been rotating his injection sites.      Mood/Anxiety/Insomnia: current therapy includes lamictal 200mg daily, duloxetine 60mg twice daily, hydroxyzine 50mg in the morning and 100mg at night (for anxiety and insomnia), Xanax XR 0.5mg up to three times daily as needed, patient has been taking 1mg before bedtime because he has been having more nightmares in the middle of the night,  quetiapine 50mg as needed if he is really agitated (uses once every 10  days), melatonin 6mg at bedtime (psychiatry increased his dose from 3mg to 6mg because he was acting out his dream.)  Patient's psychiatrist is Dr. Rodriguez at [facility] in [place]. Patient has been feeling more anxious. Patient graduated from year long program at [facility].  Sees therapist weekly at [facility].       Pain: current therapy includes APAP as needed-patient is taking  500 mg 2-3 times daily, celebrex 200mg bid prn (pt is taking twice daily scheduled), lyrica 150mg three times daily (patient had to increase from twice daily to three times daily after changing his NSAID to nabumetone). Patient has not had to take any oxycodone since he started Enbrel. Before he was averaging about 2 tablets every other day. Patient is doing physical therapy at [facility].     ASSESSMENT:                             Current medications were reviewed today.     Medication Adherence: no issues identified    Groin pain: Stable    Gastroparesis: Stable    Ankylosing Spondylitis: Stable     Mood/Anxiety/Insomnia: Stable.  Patient in close follow-up with psychiatry.    Allergies: Stable.     Pain: Stable.    PLAN:                          No medication recommendations made today.    I spent 30 minutes with this patient today.  All changes were made via collaborative practice agreement with Ksenia Lyles. A copy of the visit note was provided to the patient's primary care provider.    Will follow up in 8 weeks.    The patient was sent via dotHIV a summary of these recommendations as an after visit summary.     Radha Mcdonald, Pharm.D, BCACP  Medication Therapy Management Pharmacist

## 2020-04-06 NOTE — TELEPHONE ENCOUNTER
Prior Authorization Approval    Authorization Effective Date: 3/30/2020  Authorization Expiration Date:  Until Further notice   Medication: omega-3 acid ethyl esters (LOVAZA) 1 g capsule- APPROVED   Approved Dose/Quantity:   Reference #:     Insurance Company: [employer] - Phone [phone] Fax [phone]  Expected CoPay:       CoPay Card Available:      Foundation Assistance Needed:    Which Pharmacy is filling the prescription (Not needed for infusion/clinic administered): [facility] - [place], [place] - Formerly named [facility] [address] AT PORTAL TO [facility]  Pharmacy Notified:    Patient Notified: Yes- left message

## 2020-04-06 NOTE — TELEPHONE ENCOUNTER
Central Prior Authorization Team  Phone: [phone]    PA Initiation    Medication: omega-3 acid ethyl esters (LOVAZA) 1 g capsule   Insurance Company: [employer] - Phone [phone] Fax [phone]  Pharmacy Filling the Rx: [facility] - [place], [place] - [address] AT PORTAL TO REGISTERED [facility]  Filling Pharmacy Phone: [phone]  Filling Pharmacy Fax:    Start Date: 4/6/2020

## 2020-04-07 ENCOUNTER — ALLIED HEALTH/NURSE VISIT (OUTPATIENT)
Dept: PHARMACY | Facility: CLINIC | Age: 45
End: 2020-04-07
Payer: COMMERCIAL

## 2020-04-07 DIAGNOSIS — G89.4 CHRONIC PAIN SYNDROME: Primary | ICD-10-CM

## 2020-04-07 DIAGNOSIS — R10.30 INGUINAL PAIN, UNSPECIFIED LATERALITY: ICD-10-CM

## 2020-04-07 DIAGNOSIS — F41.8 DEPRESSION WITH ANXIETY: ICD-10-CM

## 2020-04-07 DIAGNOSIS — M45.8 ANKYLOSING SPONDYLITIS OF SACRAL REGION (H): ICD-10-CM

## 2020-04-07 DIAGNOSIS — G47.00 INSOMNIA, UNSPECIFIED TYPE: ICD-10-CM

## 2020-04-07 DIAGNOSIS — J30.1 ALLERGIC RHINITIS DUE TO POLLEN, UNSPECIFIED SEASONALITY: ICD-10-CM

## 2020-04-07 DIAGNOSIS — K31.84 GASTROPARESIS: ICD-10-CM

## 2020-04-07 PROCEDURE — 99607 MTMS BY PHARM ADDL 15 MIN: CPT | Performed by: PHARMACIST

## 2020-04-07 PROCEDURE — 99606 MTMS BY PHARM EST 15 MIN: CPT | Performed by: PHARMACIST

## 2020-04-07 RX ORDER — LANOLIN ALCOHOL/MO/W.PET/CERES
6 CREAM (GRAM) TOPICAL
COMMUNITY
End: 2020-12-02

## 2020-04-07 NOTE — PATIENT INSTRUCTIONS
Recommendations from today's MTM visit:                                                      No medication recommendations made today    It was great to speak with you today.  I value your experience and would be very thankful for your time with providing feedback on our clinic survey. You may receive a survey via email or text message in the next few days.     Next MTM visit: 2 months    To schedule another MTM appointment, please call the clinic directly or you may call the MTM scheduling line at [phone] or toll-free at [phone].     My Clinical Pharmacist's contact information:                                                      It was a pleasure talking with you today!  Please feel free to contact me with any questions or concerns you have.      Radha Mcdonald, Pharm.D, BCACP  Medication Therapy Management Pharmacist

## 2020-04-09 ENCOUNTER — PRE VISIT (OUTPATIENT)
Dept: NEUROLOGY | Facility: CLINIC | Age: 45
End: 2020-04-09

## 2020-04-09 NOTE — TELEPHONE ENCOUNTER
FUTURE VISIT INFORMATION      FUTURE VISIT INFORMATION:    Date: 7/20/2020    Time: 120pm    Location: [facility]  REFERRAL INFORMATION:    Referring provider:  Dr. Malone    Referring providers clinic:  [facility]     Reason for visit/diagnosis  Dysautonomia     RECORDS REQUESTED FROM:       Clinic name Comments Records Status Imaging Status   Internal Dr. Malone-3/18/2020, 3/29/2019, 2/22/2019    CT Head 5/8/2019 [facility] PACS          [facility] CT Head 1/30/2020 Care Everywhere PACS

## 2020-04-15 ENCOUNTER — MYC MEDICAL ADVICE (OUTPATIENT)
Dept: FAMILY MEDICINE | Facility: CLINIC | Age: 45
End: 2020-04-15

## 2020-04-15 NOTE — TELEPHONE ENCOUNTER
What type of form? [employer]  What day did you drop off your forms? 04/15/2020  Is there a due date? ASAP (7-10 business day to compete forms)   How would you like to receive these forms? Please fax to [phone]  Which clinic was the form dropped off at? [place]    What is the best number to contact you? [phone]  What time works best to contact you with in 4 hrs? Any  Is it okay to leave a message? Yes    Cristal Tabor

## 2020-04-21 NOTE — TELEPHONE ENCOUNTER
[employer] form dropped off by pt on 4/15/20 placed on Dr. Lyles 's desk for completion upon his return to office, as directed      Alma MASSEY)(CHEY)

## 2020-04-21 NOTE — TELEPHONE ENCOUNTER
Forms are very long - too long to do with [employer] - I will complete when back in office next week.  Please have a printed copy in my basket

## 2020-04-22 ENCOUNTER — VIRTUAL VISIT (OUTPATIENT)
Dept: RHEUMATOLOGY | Facility: CLINIC | Age: 45
End: 2020-04-22
Payer: MEDICARE

## 2020-04-22 DIAGNOSIS — M45.9 ANKYLOSING SPONDYLITIS, UNSPECIFIED SITE OF SPINE (H): Primary | ICD-10-CM

## 2020-04-22 DIAGNOSIS — Z79.899 HIGH RISK MEDICATIONS (NOT ANTICOAGULANTS) LONG-TERM USE: ICD-10-CM

## 2020-04-22 PROCEDURE — 99213 OFFICE O/P EST LOW 20 MIN: CPT | Mod: 95 | Performed by: INTERNAL MEDICINE

## 2020-04-22 NOTE — PROGRESS NOTES
"Joel Pineda is a 44 year old male who is being evaluated via a billable video visit.      The patient has been notified of following:     \"This video visit will be conducted via a call between you and your physician/provider. We have found that certain health care needs can be provided without the need for an in-person physical exam.  This service lets us provide the care you need with a video conversation.  If a prescription is necessary we can send it directly to your pharmacy.  If lab work is needed we can place an order for that and you can then stop by our lab to have the test done at a later time.    Video visits are billed at different rates depending on your insurance coverage.  Please reach out to your insurance provider with any questions.    If during the course of the call the physician/provider feels a video visit is not appropriate, you will not be charged for this service.\"    Patient has given verbal consent for Video visit? Yes    How would you like to obtain your AVS? Minniehart    Patient would like the video invitation sent by: Text to cell phone: [phone]    Will anyone else be joining your video visit? No    Rheumatology Video Visit      Joel Pineda MRN# [id number]   YOB: 1975 Age: 44 year old      Date of visit: 4/22/20   PCP: Dr. Ksenia Lyles    Chief Complaint   Patient presents with:  RECHECK: ankylosing spondylitis; wants to update that he was recently diagnosed with POTS    Assessment and Plan     1.  Ankylosing spondylitis: Many years of inflammatory back pain but no clear sacroiliitis seen on x-rays or MRIs; then had a lumbar spine x-ray on 2/23/2018 at [facility] showing \"Moderate L5-S1 and mild L4-5 degenerative disc disease and degenerative facet arthropathy. No evidence for fracture, subluxation, or spondylolisthesis. Chronic bridging enthesophyte across the lower right SI joint with irregularity of the joint space suspicious for sequelae of chronic inflammatory " "sacroiliitis. Correlate clinically.\"  This is complicated by a history of back surgery and degenerative changes.  HLA-B27 positive per record review but the actual lab report has not been seen.  He has a personal history of diverticulitis requiring sigmoidectomy.  Reportedly his mother has ulcerative colitis. Based on his symptoms, the x-ray results, and HLA-B27 positive, it is most likely ankylosing spondylitis and Remicade was started with improvement of lower back pain and resolution of lower back stiffness. However, Remicade was also associated with increased infections so it was changed to Simponi; Simponi was associated with hives, nausea, dizziness, and drowsiness, Humira (effective but associated with a sensation of burning on his tongue, and longstanding nausea that didn't seem related to me but did to Mr. Pineda). Has responded to TNF inhibitors so Enbrel was used and he reports doing well on it; tolerating it and effective for his inflammatory back symptoms.   - Continue Enbrel 50mg SQ every 7 days    2. Mechanical back pain: now seeing NSGY.      3. Obesity; reported hx of fatty liver disease: encouraged weight loss and discussed the health benefit    4.  Vaccinations: Vaccinations reviewed with Mr. Pineda.  Risks and benefits of vaccinations were discussed.  - Influenza: up to date  - [id number]: up to date  - [id number]: up to date  - Shingrix: up to date    Mr. Pineda verbalized agreement with and understanding of the rational for the diagnosis and treatment plan.  All questions were answered to best of my ability and the patient's satisfaction. Mr. Pineda was advised to contact the clinic with any questions that may arise after the clinic visit.      Thank you for involving me in the care of the patient    Return to clinic: 3 months      HPI   Joel Pineda is a 44 year old male with a past medical history significant for hypertension, hyperlipidemia, asthma, history of pulmonary embolism, history of " diverticulitis requiring sigmoidectomy, GERD, obstructive sleep apnea, bipolar disorder, hypothyroidism, B12 deficiency, CKD? (reportedly issue with contrast) and chronic pain syndrome who presents for follow-up of ankylosing spondylitis.    Today, Mr. Pineda reports that he is doing well on Enbrel.  Premedicates with benadryl out of concern that he may have a reaction.  Also recently dx'd with mild distal sensorimotor peripheral neuropathy likely related to diabetes, and autonomic neuropathy; followed by Dr. Silver.  Back pain is better on Enbrel; morning stiffness for about 45 min. No injection reactions.     Denies fevers, chills, nausea, vomiting, constipation, diarrhea. No chest pain/pressure, palpitations, or shortness of breath. No LE swelling. No neck pain. No oral or nasal sores.  No rash. No sicca symptoms.     Mother: ulcerative colitis    Tobacco: None  EtOH: None  Drugs: None    ROS   GEN: No fevers, chills, night sweats, or weight change  SKIN: No itching, rashes, sores  HEENT: No oral or nasal ulcers.  CV: No chest pain, pressure, palpitations, or dyspnea on exertion.  PULM: No SOB, wheeze, cough.  GI: No nausea, vomiting, constipation, diarrhea. No blood in stool.   : No blood in urine.  MSK: See HPI.  NEURO: See HPI  EXT: No LE swelling  PSYCH: See HPI    Active Problem List     Patient Active Problem List   Diagnosis     Major depressive disorder, recurrent episode (H)     Intermittent asthma     Hyperlipidemia LDL goal <100     Chronic nonallergic rhinitis     Diverticulosis     GERD (gastroesophageal reflux disease)     Anxiety     LLOYD (obstructive sleep apnea)- mild (AHI 11)     Intracranial arachnoid cyst     Facet arthritis of cervical region     Acquired hypothyroidism     Bipolar 2 disorder (H)     Chronic midline low back pain without sciatica     Irritable bowel syndrome with diarrhea     B12 deficiency     Essential hypertension with goal blood pressure less than 140/90     Chronic  pain syndrome     Ankylosing spondylitis of sacral region (H)     Morbid obesity due to hypertriglyceridemia (H)     Fatty infiltration of liver     DDD (degenerative disc disease), lumbar     Type 2 diabetes mellitus with complication, without long-term current use of insulin (H)     Peripheral polyneuropathy     History of pulmonary embolism     Ingrown toenail     Lipoma of skin and subcutaneous tissue     Hypertriglyceridemia     Gastroparesis     Orthostatic dizziness     POTS (postural orthostatic tachycardia syndrome)     Past Medical History     Past Medical History:   Diagnosis Date     Acne      Allergic state      Anxiety      Bipolar 2 disorder (H)      Chest pain     Chest pain, regulated w/BP meds. Clear arteries.     Chronic pain      Depressive disorder      Diabetes (H)      Diverticulosis      Gastroesophageal reflux disease      Hypertension      IBS (irritable bowel syndrome)      Intracranial arachnoid cyst      Polyneuropathy      Pulmonary embolism (H)      Skin exam, screening for cancer 12/3/2013     Sleep apnea      Uncomplicated asthma      Past Surgical History     Past Surgical History:   Procedure Laterality Date     BACK SURGERY  10/07    lumbar discectomy L5-S1     COLONOSCOPY      Note: colonoscopy scheduled with [facility] on Friday, 9/4/15     COSMETIC SURGERY  2012    Nose Exterior - functional     GI SURGERY  August 2013    Sigmoidectomy     HERNIA REPAIR, UMBILICAL  8/23/11    Dr. Evan whiting     INCISION AND DRAINAGE, ABSCESS, COMPLEX  8/23/11    umbilical, Dr. Evan Beavers     LAPAROSCOPIC ASSISTED COLECTOMY LEFT (DESCENDING)  8/15/2013    Procedure: LAPAROSCOPIC ASSISTED COLECTOMY LEFT (DESCENDING);  Laparoscopic Hand Assisted Sigmoid Resection, Mobilization of Splenic Fissure, coloproctoscopy, *Latex Free Room* Anesthesia General with Pain block  ;  Surgeon: Aurora Justice MD;  Location: [facility] OR     NERVE SURGERY  8/18/11    RF ablation @ L3-S1 @ [facility]     RECONSTRUCT  NOSE AND SEPTUM (FUNCTIONAL)  10/14/2011    Procedure:RECONSTRUCT NOSE AND SEPTUM (FUNCTIONAL); Functional Septorhinoplasty, Turbinate Reduction, ; Surgeon:CEDRIC CUEVAS; Location:[facility] OR     SINUS SURGERY  10/1/01    ethmoidectomy chronic sinusitis     Allergy     Allergies   Allergen Reactions     Amoxicillin-Pot Clavulanate Difficulty breathing     Banana Shortness Of Breath     Pt reports organic Banana is okay.      Nitroglycerin Palpitations     Penicillins Anaphylaxis     Provigil [Modafinil] Shortness Of Breath     headache     Gadolinium Hives and Itching     Patient was premedicated for the contrast allergy. He did still have a reaction a few hours after injection. Hives and itching. Dr. Gomez told tech to inform pt he should only have contrast again in the future when premedicated and at a hospital. Not at an outpatient facility.      Ketoconazole      Topical cream caused swelling and itching     Dye [Contrast Dye] Other (See Comments) and Hives     Moderate flushing, CT contrast     Golimumab      Hives, bradycardia, face swelling     Neurontin [Gabapentin] Hives     Moderate hives     Nortriptyline Hives     Varicella Virus Vaccine Live      Rash     Flagyl [Metronidazole Hcl] Palpitations and Hives     Latex Rash     Metronidazole Palpitations, Other (See Comments) and Rash     dizziness (versus ciprofloxacin taken at same time)     No Clinical Screening - See Comments Rash     Nitrile gloves     Current Medication List     Current Outpatient Medications   Medication Sig     acetaminophen 500 MG CAPS Take 500 mg by mouth every 4 hours as needed     albuterol (PROAIR HFA/PROVENTIL HFA/VENTOLIN HFA) 108 (90 Base) MCG/ACT inhaler Inhale 2 puffs into the lungs every 4 hours as needed for shortness of breath / dyspnea or wheezing     albuterol (PROVENTIL) (2.5 MG/3ML) 0.083% neb solution Take 1 vial (2.5 mg) by nebulization every 6 hours as needed for shortness of breath / dyspnea or wheezing      ALPRAZolam (XANAX XR) 0.5 MG 24 hr tablet Take 0.5 mg by mouth 3 times daily as needed      aspirin (ASA) 81 MG tablet Take 81 mg by mouth daily      cetirizine (ZYRTEC) 10 MG tablet Take 1 tablet (10 mg) by mouth At Bedtime     cholecalciferol (VITAMIN D3) 11080 units (1250 mcg) capsule Take one capsule every two weeks.     cyanocobalamin (CYANOCOBALAMIN) 1000 MCG/ML injection Inject 1 mL (1,000 mcg) into the muscle every 30 days     DULoxetine (CYMBALTA) 60 MG capsule Take 60 mg by mouth 2 times daily      EPINEPHrine (EPIPEN/ADRENACLICK/OR ANY BX GENERIC EQUIV) 0.3 MG/0.3ML injection 2-pack Inject 0.3 mLs (0.3 mg) into the muscle once as needed for anaphylaxis     etanercept (ENBREL SURECLICK) 50 MG/ML autoinjector Inject 50 mg Subcutaneous once a week Hold for signs of infection, and seek medical attention.     famotidine (PEPCID) 20 MG tablet Prior to administration of Humira every 2 weeks     fluticasone-salmeterol (ADVAIR) 500-50 MCG/DOSE inhaler Inhale 1 puff into the lungs every 12 hours     hydrOXYzine (ATARAX) 50 MG tablet Take 50 mg by mouth 2 times daily For anxiety and insomnia     ketotifen (ZADITOR) 0.025 % ophthalmic solution 1 drop 2 times daily     lamoTRIgine (LAMICTAL) 100 MG tablet Take 200 mg by mouth daily     levothyroxine (SYNTHROID/LEVOTHROID) 75 MCG tablet TAKE 1 TABLET EVERY MORNING     Liniments (SALONPAS EX) Externally apply 1 Application topically daily as needed     melatonin 3 MG tablet Take 6 mg by mouth nightly as needed for sleep     metFORMIN (GLUCOPHAGE-XR) 500 MG 24 hr tablet Take 2 tablets (1,000 mg) by mouth daily (with dinner)     metoclopramide (REGLAN) 5 MG tablet Take 1 tablet (5 mg) by mouth 3 times daily as needed (nausea)     metoprolol succinate ER (TOPROL-XL) 200 MG 24 hr tablet Take 1 tablet (200 mg) by mouth 2 times daily     montelukast (SINGULAIR) 10 MG tablet Take 1 tablet (10 mg) by mouth every evening     nabumetone (RELAFEN) 750 MG tablet Take 1 tablet (750  mg) by mouth 2 times daily as needed for pain     omega-3 acid ethyl esters (LOVAZA) 1 g capsule TAKE 2 CAPSULES BY MOUTH TWICE DAILY.     omeprazole 20 MG tablet Take 20 mg by mouth daily      order for DME Jobst thigh high hose:  30-40 mmHg    Equipment being ordered: Jobst thigh high hose 30-40 mmHg     order for DME Equipment being ordered: lumbosacral belt/brace     order for DME Respironics REMSTAR 60 Series Auto CPAP 9-13 cm H2O, Wisp nasal mask w/a large cushion and a chinstrap     oxyCODONE (ROXICODONE) 5 MG tablet Take 1-2 tablets (5-10 mg) by mouth every 6 hours as needed for pain (maximum 4 tablet(s) per day)     pregabalin (LYRICA) 150 MG capsule Take 1 capsule (150 mg) by mouth 2 times daily (Patient taking differently: Take 150 mg by mouth 3 times daily )     QUEtiapine (SEROQUEL) 25 MG tablet Take 25 mg by mouth 4 times daily as needed     ramipril (ALTACE) 10 MG capsule Take 1 capsule (10 mg) by mouth daily     rizatriptan (MAXALT-MLT) 5 MG ODT Take 1 tablet (5 mg) by mouth at onset of headache for migraine     rosuvastatin (CRESTOR) 40 MG tablet TAKE 1 TABLET DAILY     syringe, disposable, (BD TUBERCULIN SYRINGE) 1 ML MISC Equipment being ordered: 1 ml tuberculin syringes to be used for Vitamin B12 injections.     vitamin C (ASCORBIC ACID) 500 MG tablet Take 500 mg by mouth daily     celecoxib (CELEBREX) 200 MG capsule TAKE 1 CAPSULE 2 TIMES     DAILY AS NEEDED MODERATE   PAIN (Patient not taking: Reported on 4/7/2020)     Ginger, Zingiber officinalis, (GINGER ROOT) 550 MG CAPS capsule Take 550 mg by mouth Up to three times daily     ipratropium (ATROVENT) 0.06 % nasal spray Spray 2 sprays into both nostrils 3 times daily as needed     traMADol (ULTRAM) 50 MG tablet Take 1 tablet (50 mg) by mouth 3 times daily as needed for severe pain     No current facility-administered medications for this visit.          Social History   See HPI    Family History     Family History   Problem Relation Age of  "Onset     Musculoskeletal Disorder Mother         back     Anxiety Disorder Mother      Colon Polyps Mother      Ulcerative Colitis Mother         and ischemic small intestine, surgery     Hypertension Mother      Breast Cancer Mother      Osteoporosis Mother      Diabetes Mother         Type 2, Diagnosed in 2014     Depression Mother         Takes Cymbalta to help with chronic pain + depx     Thyroid Disease Mother         Hypothyroidism     Obesity Mother         Under much better control latter half of 2015     Musculoskeletal Disorder Father         back     Substance Abuse Father      Hypertension Father      Hyperlipidemia Father      Depression Father         Off meds for many years. Seems \"ok\"     Heart Disease Maternal Grandmother      Heart Disease Maternal Grandfather      Psychotic Disorder Paternal Grandfather      Suicide Paternal Grandfather      Depression Paternal Grandfather         Pediatrician. Committed suicide by pistol in 1990.     Musculoskeletal Disorder Brother         back     Depression Brother         Expressed as anger and moodiness     Substance Abuse Sister      Depression Sister         Mental Health Therapist, yet no anti-depressants?     Anxiety Disorder Sister         Mental Health Therapist, yet no anti-anxiety meds?     Other Cancer Other         Bladder Cancer - Fatal     Substance Abuse Brother      Colon Cancer No family hx of      Crohn's Disease No family hx of      Anesthesia Reaction No family hx of      Cancer No family hx of         No family history of skin cancer     Physical Exam     Temp Readings from Last 3 Encounters:   04/03/20 97.4  F (36.3  C) (Oral)   03/16/20 98.7  F (37.1  C) (Oral)   03/11/20 98.2  F (36.8  C) (Oral)     BP Readings from Last 5 Encounters:   04/03/20 126/81   03/16/20 120/79   03/11/20 126/86   02/17/20 127/67   02/06/20 126/80     Pulse Readings from Last 1 Encounters:   04/03/20 63     Resp Readings from Last 1 Encounters:   04/03/20 19 " "    Estimated body mass index is 36.4 kg/m  as calculated from the following:    Height as of 4/3/20: 1.981 m (6' 6\").    Weight as of 4/3/20: 142.9 kg (315 lb).    GEN: NAD; obese  HEENT: MMM.  Anicteric, noninjected sclera  PULM: No increased work of breathing  MSK:  Hands without swelling.   PSYCH: Alert. Appropriate.  .    Labs / Imaging (select studies)   RF/CCP  Recent Labs   Lab Test 08/07/15  0846 09/03/14  1232   RHF <20 <20     CBC  Recent Labs   Lab Test 12/27/19  1209 09/26/19  1010 08/08/19  1658   WBC 8.0 7.7 13.6*   RBC 5.20 5.15 5.72   HGB 14.9 15.2 16.3   HCT 45.0 46.2 50.0   MCV 87 90 87   RDW 14.4 13.8 13.2    262 316   MCH 28.7 29.5 28.5   MCHC 33.1 32.9 32.6   NEUTROPHIL 45.0 39.7 54.0   LYMPH 38.1 44.9 34.8   MONOCYTE 13.7 12.2 8.6   EOSINOPHIL 2.4 2.3 1.3   BASOPHIL 0.8 0.9 0.9   ANEU 3.6 3.1 7.3   ALYM 3.0 3.5 4.7   KATIE 1.1 0.9 1.2   AEOS 0.2 0.2 0.2   ABAS 0.1 0.1 0.1     CMP  Recent Labs   Lab Test 01/24/20  1637 09/26/19  1010 08/08/19  1658 07/21/19  1954     --  138 142   POTASSIUM 4.4  --  4.5 4.3   CHLORIDE 106  --  106 109   CO2 24  --  22 28   ANIONGAP 6  --  10 5   GLC 99  --  95 100*   BUN 14  --  13 9   CR 0.81 0.83 0.88 0.84   GFRESTIMATED >90 >90 >90 >90   GFRESTBLACK >90 >90 >90 >90   ALYCE 9.2  --  9.8 9.1   BILITOTAL 0.4 0.4  --  0.6   ALBUMIN 4.4 4.3  --  4.2   PROTTOTAL 7.7 7.7  --  7.5   ALKPHOS 104 107  --  102   AST 60* 48*  --  45   ALT 61 53  --  49     Calcium/VitaminD  Recent Labs   Lab Test 01/24/20  1637 09/13/19  0943 08/08/19  1658 07/21/19  1954  01/03/19  0844  05/03/17  1456   ALYCE 9.2  --  9.8 9.1   < > 9.6   < > 9.5   VITDT  --  40  --   --   --  38  --  30    < > = values in this interval not displayed.     ESR/CRP  Recent Labs   Lab Test 09/26/19  1010 04/01/19  1540 02/13/16  1510   SED 4 4 6   CRP <2.9 <2.9 3.4     Lipid Panel  Recent Labs   Lab Test 09/13/19  0943 03/19/19  0807 12/24/18  1114  12/03/14  0958 10/31/13  0731 08/10/13  1109 "   CHOL 150 129 152   < > 189 195 199   TRIG 468* 583* 434*   < > 487* 285* 1,025*   HDL 33* 28* 34*   < > 27* 34* 23*   LDL Cannot estimate LDL when triglyceride exceeds 400 mg/dL  64 Cannot estimate LDL when triglyceride exceeds 400 mg/dL  53 Cannot estimate LDL when triglyceride exceeds 400 mg/dL   < > Cannot estimate LDL when triglyceride exceeds 400 mg/dL  108 104 Cannot estimate LDL when triglyceride exceeds 400 mg/dL  38   VLDL  --   --   --   --  Cannot estimate VLDL when triglyceride exceeds 400 mg/dL. 57* Cannot estimate VLDL when triglyceride exceeds 400 mg/dL.   CHOLHDLRATIO  --   --   --   --  7.0* 5.8* 8.8*   NHDL 117 101 118   < >  --   --   --     < > = values in this interval not displayed.     Hepatitis B  Recent Labs   Lab Test 02/28/18  1157 01/06/15  1028   HBCAB Nonreactive  --    HEPBANG Nonreactive Nonreactive     Hepatitis C  Recent Labs   Lab Test 02/28/18  1157 01/06/15  1028   HCVAB Nonreactive Nonreactive   Assay performance characteristics have not been established for newborns,   infants, and children       Lyme ab screening  Recent Labs   Lab Test 02/22/19  1457   LYMEGM 0.02       Tuberculosis Screening  Recent Labs   Lab Test 02/28/18  1157 01/06/15  1028   TBRSLT Negative Negative   TBAGN 0.00 0.00       Immunization History     Immunization History   Administered Date(s) Administered     FLU 6-35 months 01/03/2019     Flu, Unspecified 08/01/2012, 09/12/2019     Influenza (H1N1) 02/04/2010     Influenza (IIV3) PF 11/11/1999, 10/28/2003, 10/15/2008, 08/21/2012, 08/02/2013, 09/09/2013     Influenza Vaccine IM > 6 months Valent IIV4 10/02/2015, 10/10/2016, 09/27/2017, 09/07/2018, 01/03/2019, 09/12/2019, 01/14/2020     Pneumo Conj 13-V (2010&after) 10/02/2015     Pneumococcal 23 valent 05/15/2009, 10/10/2016     TD (ADULT, 7+) 10/04/2002     TDAP Vaccine (Adacel) 07/16/2010, 01/23/2020     Td (Adult), Adsorbed 10/13/1997, 10/04/2002     Zoster vaccine recombinant adjuvanted  (SHINGRIX) 05/29/2019, 09/12/2019          Chart documentation done in part with Dragon Voice recognition Software. Although reviewed after completion, some word and grammatical error may remain.      Video-Visit Details    Type of service:  Video Visit    Video Start Time: 2:29 PM    Video End Time (time video stopped): 2:44PM    Originating Location (pt. Location): Home    Distant Location (provider location):  Home    Mode of Communication:  Video Conference via [facility]      Oneil Baxter MD

## 2020-04-24 ENCOUNTER — MYC REFILL (OUTPATIENT)
Dept: FAMILY MEDICINE | Facility: CLINIC | Age: 45
End: 2020-04-24

## 2020-04-24 ENCOUNTER — VIRTUAL VISIT (OUTPATIENT)
Dept: FAMILY MEDICINE | Facility: OTHER | Age: 45
End: 2020-04-24

## 2020-04-24 DIAGNOSIS — M45.8 ANKYLOSING SPONDYLITIS OF SACRAL REGION (H): ICD-10-CM

## 2020-04-24 DIAGNOSIS — M51.369 DDD (DEGENERATIVE DISC DISEASE), LUMBAR: ICD-10-CM

## 2020-04-24 NOTE — PROGRESS NOTES
"Date: 2020 16:33:10  Clinician: Farida Hoffman  Clinician NPI: [id number]  Patient: Joel Pineda  Patient : 1975  Patient Address: [address] [place] [address] [place] [address]  Patient Phone: [phone]  Visit Protocol: URI  Patient Summary:  Joel is a 44 year old ( : 1975 ) male who initiated a Visit for COVID-19 (Coronavirus) evaluation and screening. When asked the question \"Please sign me up to receive news, health information and promotions from [employer].\", Joel responded \"No\".    Joel states his symptoms started gradually 3-4 days ago.   His symptoms consist of a cough, nasal congestion, malaise, myalgia, a headache, wheezing, and nausea. He is experiencing mild difficulty breathing with activities but can speak normally in full sentences.   Symptom details     Nasal secretions: The color of his mucus is white.    Cough: Joel coughs a few times an hour and his cough is not more bothersome at night. Phlegm comes into his throat when he coughs. He does not believe his cough is caused by post-nasal drip. The color of the phlegm is white.     Wheezing: Joel has been diagnosed with asthma. The wheezing does not interfere with his normal daily activities.    Headache: He states the headache is mild (1-3 on a 10 point pain scale).      Joel denies having facial pain or pressure, sore throat, ear pain, fever, rhinitis, chills, vomiting, teeth pain, ageusia, anosmia, and diarrhea. He also denies double sickening (worsening symptoms after initial improvement) and having recent facial or sinus surgery in the past 60 days.   Precipitating events  He has not recently been exposed to someone with influenza. Joel has not been in close contact with any high risk individuals.   Pertinent COVID-19 (Coronavirus) information  Joel has not traveled internationally or to the areas where COVID-19 (Coronavirus) is widespread, including cruise ship travel in the last 14 days before the " start of his symptoms.   Joel does not work or volunteer as healthcare worker or a  and does not work or volunteer in a healthcare facility.   He does not live with a healthcare worker.   Joel has not had a close contact with a laboratory-confirmed COVID-19 patient within 14 days of symptom onset. He also has not had a close contact with a suspected COVID-19 patient within 14 days of symptom onset.   Pertinent medical history  Joel has taken an antibiotic medication in the past month. Antibiotic details as reported by the patient (free text): Bronchitis and Sinus Infection   Joel does not need a return to work/school note.   Weight: 315 lbs   Joel does not smoke or use smokeless tobacco.   Additional information as reported by the patient (free text): Increased sensitivity to particulates with breathing. Intermittent left chest flank stabbing pain/ache started around 2:00pm today, and can be recreated with deep breath (also occurs randomly). Increased sputum production (0.5 tsp white, sticky output per cough) per hour, from clear yesterday. I am immunocompromised, have autonomic neuropathy, POTS, and diabetes.   Weight: 315 lbs    MEDICATIONS: Ciprodex otic (ear), Antifungal (clotrimazole) topical, Maxalt-MLT oral, ramipril oral, Seroquel oral, Lyrica oral, oxycodone oral, famotidine oral, omeprazole oral, Lovaza oral, Singulair oral, metoprolol succinate oral, Glucophage XR oral, melatonin oral, hydroxyzine HCl oral, Advair Diskus inhalation, Cymbalta oral, cyanocobalamin (vit B-12) injection, Zyrtec oral, Celebrex oral, Aspirin Low Dose oral, alprazolam oral, Tylenol Extra Strength oral, Ventolin HFA inhalation, Enbrel SureClick subcutaneous, ALLERGIES: Flagyl, Penicillins, ketoconazole  Clinician Response:  Dear Joel,  I am sorry you are not feeling well. To determine the most appropriate care for you, I would like you to be seen in person to further discuss your health history  and symptoms.  You will not be charged for this Visit. Thank you for trusting us with your care.  COVID-19 (Coronavirus) General Information  Because there is currently no vaccine to prevent infection, the best way to protect yourself is to avoid being exposed to this virus. Common symptoms of COVID-19 include but are not limited to fever, cough, and shortness of breath. These symptoms appear 2-14 days after you are exposed to the virus that causes COVID-19. Click here for more information from the CDC on how to protect yourself.  If you are sick with COVID-19 or suspect you are infected with the virus that causes COVID-19, follow the steps here from the CDC to help prevent the disease from spreading to people in your home and community.  Click here for general information from the CDC on testing.  If you develop any of these emergency warning signs for COVID-19, get medical attention immediately:     Trouble breathing    Persistent pain or pressure in the chest    New confusion or inability to arouse    Bluish lips or face      Call your doctor or clinic before going in. Call 911 if you have a medical emergency and notify the  you have or think you may have COVID-19.  For more detailed and up to date information on COVID-19 (Coronavirus), please visit the CDC website.   Diagnosis: Refer for additional evaluation  Diagnosis ICD: R69

## 2020-04-28 RX ORDER — OXYCODONE HYDROCHLORIDE 5 MG/1
5-10 TABLET ORAL EVERY 6 HOURS PRN
Qty: 35 TABLET | Refills: 0 | Status: SHIPPED | OUTPATIENT
Start: 2020-04-28 | End: 2020-05-20

## 2020-04-30 ENCOUNTER — TELEPHONE (OUTPATIENT)
Dept: FAMILY MEDICINE | Facility: CLINIC | Age: 45
End: 2020-04-30

## 2020-04-30 ENCOUNTER — NURSE TRIAGE (OUTPATIENT)
Dept: NURSING | Facility: CLINIC | Age: 45
End: 2020-04-30

## 2020-04-30 NOTE — TELEPHONE ENCOUNTER
Reason for call:  Other   Patient called regarding (reason for call): Pt would like to be seen at the clinic for a red bump on right foot 2nd toe, triaged to be seen within 24hrs; please advise  Additional comments:     Phone number to reach patient:  Home number on file [phone] (home)    Best Time:  any    Can we leave a detailed message on this number?  NO    Travel screening: Not Applicable

## 2020-04-30 NOTE — TELEPHONE ENCOUNTER
Joel reports a red bump on his second toe on right foot, about 1 cm. Reports intermittent pain rated 4/10. He states that he has DM and is on immunosuppressant.    COVID 19 Nurse Triage Plan/Patient Instructions    Please be aware that novel coronavirus (COVID-19) may be circulating in the community. If you develop symptoms such as fever, cough, or SOB or if you have concerns about the presence of another infection including coronavirus (COVID-19), please contact your health care provider or visit [email or web address].     Disposition/Instructions    Patient to have scheduled clinic Visit with a provider. (within 24 hours)      Call Back If: Your symptoms worsen before you are able to complete your Visit with a provider.    Thank you for limiting contact with others, wearing a simple mask to cover your cough, practice good hand hygiene habits and accessing our virtual services where possible to limit the spread of this virus.    For more information about COVID19 and options for caring for yourself at home, please visit the CDC website at https://www.cdc.gov/coronavirus/2019-ncov/about/steps-when-sick.html  For more options for care at [facility], please visit our website at [email or web address]    For more information, please use the [place] Department of Health COVID-19 Website: https://www.health.[facility].[place].us/diseases/coronavirus/index.html  [place] Department of Health ([facility]) COVID-19 Hotlines (Interpreters available):      Health questions: Phone Number: [phone] or [phone] and Hours: 7 a.m. to 7 p.m.    Schools and  questions: Phone Number: [phone] or [phone] and Hours 7 a.m. to 7 p.m.      Carol Butcher RN/[facility] Nurse Advisor      Reason for Disposition    [1] Swollen toe AND [2] no fever  (Exceptions: just localized bump from bunion, corns, insect bite, sting)    Additional Information    Negative: Foot is cool or blue in comparison to  other foot    Negative: Purple or black skin on toe  (Exception: simple recalled injury with bruise)    Negative: [1] Looks infected (e.g., spreading redness, red streak, pus) AND [2] fever    Negative: Patient sounds very sick or weak to the triager    Negative: [1] SEVERE pain (e.g., excruciating, unable to do any normal activities) AND [2] not improved after 2 hours of pain medicine    Negative: [1] Redness spreading into foot or red streak into foot AND [2] no fever    Negative: Looks like a boil, infected sore, or deep ulcer    Protocols used: TOE PAIN-A-AH

## 2020-05-01 ENCOUNTER — OFFICE VISIT (OUTPATIENT)
Dept: FAMILY MEDICINE | Facility: CLINIC | Age: 45
End: 2020-05-01
Payer: MEDICARE

## 2020-05-01 ENCOUNTER — VIRTUAL VISIT (OUTPATIENT)
Dept: FAMILY MEDICINE | Facility: OTHER | Age: 45
End: 2020-05-01

## 2020-05-01 VITALS
HEART RATE: 73 BPM | HEIGHT: 78 IN | SYSTOLIC BLOOD PRESSURE: 128 MMHG | DIASTOLIC BLOOD PRESSURE: 82 MMHG | WEIGHT: 311 LBS | RESPIRATION RATE: 19 BRPM | OXYGEN SATURATION: 100 % | BODY MASS INDEX: 35.98 KG/M2 | TEMPERATURE: 98.3 F

## 2020-05-01 DIAGNOSIS — L73.1 INGROWN HAIR: ICD-10-CM

## 2020-05-01 DIAGNOSIS — L84 CALLUS OF FOOT: Primary | ICD-10-CM

## 2020-05-01 PROCEDURE — 99213 OFFICE O/P EST LOW 20 MIN: CPT | Performed by: FAMILY MEDICINE

## 2020-05-01 RX ORDER — CLINDAMYCIN HCL 150 MG
150 CAPSULE ORAL 3 TIMES DAILY
Qty: 21 CAPSULE | Refills: 0 | Status: SHIPPED | OUTPATIENT
Start: 2020-05-01 | End: 2020-05-18

## 2020-05-01 ASSESSMENT — ANXIETY QUESTIONNAIRES
GAD7 TOTAL SCORE: 10
6. BECOMING EASILY ANNOYED OR IRRITABLE: MORE THAN HALF THE DAYS
7. FEELING AFRAID AS IF SOMETHING AWFUL MIGHT HAPPEN: SEVERAL DAYS
1. FEELING NERVOUS, ANXIOUS, OR ON EDGE: MORE THAN HALF THE DAYS
2. NOT BEING ABLE TO STOP OR CONTROL WORRYING: SEVERAL DAYS
5. BEING SO RESTLESS THAT IT IS HARD TO SIT STILL: SEVERAL DAYS
IF YOU CHECKED OFF ANY PROBLEMS ON THIS QUESTIONNAIRE, HOW DIFFICULT HAVE THESE PROBLEMS MADE IT FOR YOU TO DO YOUR WORK, TAKE CARE OF THINGS AT HOME, OR GET ALONG WITH OTHER PEOPLE: SOMEWHAT DIFFICULT
3. WORRYING TOO MUCH ABOUT DIFFERENT THINGS: MORE THAN HALF THE DAYS

## 2020-05-01 ASSESSMENT — PAIN SCALES - GENERAL: PAINLEVEL: MODERATE PAIN (5)

## 2020-05-01 ASSESSMENT — PATIENT HEALTH QUESTIONNAIRE - PHQ9
SUM OF ALL RESPONSES TO PHQ QUESTIONS 1-9: 13
5. POOR APPETITE OR OVEREATING: SEVERAL DAYS

## 2020-05-01 ASSESSMENT — MIFFLIN-ST. JEOR: SCORE: 2423.94

## 2020-05-01 NOTE — PROGRESS NOTES
"Date: 2020 19:09:55  Clinician: Holly Peña  Clinician NPI: [id number]  Patient: Joel Pineda  Patient : 1975  Patient Address: [address] [place] [address] [place] [address]  Patient Phone: [phone]  Visit Protocol: General skin conditions  Patient Summary:  Joel is a 44 year old ( : 1975 ) male who initiated a Visit for evaluation of an unspecified skin condition. When asked the question \"Please sign me up to receive news, health information and promotions from [employer].\", Joel responded \"No\".    Images of his skin condition were uploaded.  His symptoms started 1-3 days ago and affect the right side of his body. The skin condition is located on his feet. The skin condition is red in color.   It feels tender to touch, numb, and painful. The symptoms do not interfere with his sleep. Joel also feels feverish.   Symptom details     Redness: The redness has not rapidly increased in size.    Pain: The pain is moderate (between 4-6 on a 10 point pain scale).    Temperature: His current temperature is 98.6 degrees Fahrenheit.      The skin condition has changed since the symptoms started. Description of changes as reported by the patient (free text): Diabetec and on immunotherapy. Red pea-sized lump on second toe, near the pad, facing the big toe. First observed yesterday, and as of a couple of hours ago it has started to hurt alot (even with neuropathy), albeit intermittently. Never had this before.   Denied symptoms include hives, itchiness, warm to touch, drainage, crusts, blisters, burning, scabs, pimples, dry/flaky skin, and sores. Joel He does not have a rash in the shape of a bull's-eye.   Joel has not tried anything to relieve his symptoms.   Precipitating events   Joel did not come in contact with any irritants prior to the onset of his symptoms and has not been in close contact with anyone that has similar symptoms. He also did not spend time in a wooded area, swim, " travel, or spend excess time in the sun just before his symptoms started. Joel is not sure if he was bitten or stung by an insect.   Pertinent medical history  Joel has not experienced this skin condition before.   Joel has had chickenpox and has had shingles in the past.   Joel has a history of seasonal allergies or hay fever and asthma. He has ongoing medical conditions. Ongoing medical conditions as reported by the patient (free text): Please ref chart: Ankylosing Spondylitis, Autonomic Neuropathy, POTS, High Blood Pressure, Diabetes, Hypothyroidism, Gastroparesis, chronic pain.    Joel does not need a return to work/school note.   Weight: 315 lbs   Joel does not smoke or use smokeless tobacco.   Weight: 315 lbs    MEDICATIONS: Maxalt-MLT oral, ramipril oral, Seroquel oral, Lyrica oral, oxycodone oral, famotidine oral, omeprazole oral, Lovaza oral, Singulair oral, metoprolol succinate oral, Glucophage XR oral, melatonin oral, hydroxyzine HCl oral, Advair Diskus inhalation, Cymbalta oral, cyanocobalamin (vit B-12) injection, Zyrtec oral, Celebrex oral, Aspirin Low Dose oral, alprazolam oral, Tylenol Extra Strength oral, Ventolin HFA inhalation, Enbrel SureClick subcutaneous, ALLERGIES: ketoconazole, Flagyl, Penicillins  Clinician Response:  Dear Joel,   Your health is our priority. To determine the most appropriate care for you, I would like you to be seen in person to further discuss your health history and symptoms.  You will not be charged for this Visit. Thank you for trusting us with your care.   Diagnosis: Refer for additional evaluation  Diagnosis ICD: R69  Additional Clinician Notes: I recommend soaking in warm soapy water several times a day until you are able to be seen.

## 2020-05-01 NOTE — PROGRESS NOTES
"Subjective     Joel Pineda is a 44 year old male who presents to clinic today for the following health issues:    HPI   Joint Pain    Onset: Wednesday    Description:   Location: Right foot/toe  Character: stinging    Intensity: 5/10    Progression of Symptoms: intermittent    Accompanying Signs & Symptoms:  Other symptoms: red bump    History:   Previous similar pain: no       Precipitating factors:   Trauma or overuse: no     Alleviating factors:  Improved by: nothing    Therapies Tried and outcome: none      Derm problem  Onset: couple weeks    Description:   Location: buttocks  Character: filled  Itching (Pruritis): no     Progression of Symptoms:  worsening    Accompanying Signs & Symptoms:  Fever: no   Body aches or joint pain: no   Sore throat symptoms: no   Recent cold symptoms: no     History:   Previous similar : YES- possibly     Precipitating factors:   Exposure to similar : no   New exposures: None   Recent travel: no     Alleviating factors:  none    Therapies Tried and outcome: hot bath    SUBJECTIVE:  Here today with the above.  Originally scheduled because of a tender nodule on his right second toe.  Is been present for a few days and cannot really get a good look at it.  He also noted that after taking a bath to alleviate discomfort in his toe he noted a nodule and some swelling on his right buttock.  Cannot get a good look at it.  Has not really drained anything but does feel a little bit sore.  Has a history of perirectal abscess and wanted to make sure that was not performed.  No issues with constipation, loose stool, etc.  No other skin findings.    Review of systems otherwise negative.  Past medical, family, and social history reviewed and updated in chart.    OBJECTIVE:  /82 (BP Location: Right arm, Patient Position: Chair, Cuff Size: Adult Large)   Pulse 73   Temp 98.3  F (36.8  C) (Oral)   Resp 19   Ht 1.981 m (6' 6\")   Wt 141.1 kg (311 lb)   SpO2 100%   BMI 35.94 kg/m  "   Alert, pleasant, upbeat, and in no apparent discomfort.  S1 and S2 normal, no murmurs, clicks, gallops or rubs. Regular rate and rhythm. Chest is clear; no wheezes or rales. No edema or JVD.  Skin -has a nonspecific slightly raised area of erythema on the medial aspect of his right second toe.  No neovascularization is noted.  No skin erosion  1 cm excoriated slightly erythematous nodule middle of right buttock  Past labs reviewed with the patient.     ASSESSMENT / PLAN:  (L84) Callus of foot  (primary encounter diagnosis)  Comment: This seems to be hotspot and I discussed that this is likely the start of a callus.  Something is rubbing whether it be is other toe or something in his shoes we discussed cushioning this for a few days and should get better  Plan:     (L73.1) Ingrown hair  Comment: No specific treatment is needed for this at this time.  Certainly not an abscess formation.  But if he finds he is starting to have more nodules such as this on his buttocks it may be that he is colonized and we could try a short course of antibiotics.  Hold off on that for now.  Plan: clindamycin (CLEOCIN) 150 MG capsule            Follow up 1 week with progress  SLopez Lyles MD    (Chart documentation completed in part with Dragon voice-recognition software.  Even though reviewed some grammatical, spelling, and word errors may remain.)

## 2020-05-02 ASSESSMENT — ANXIETY QUESTIONNAIRES: GAD7 TOTAL SCORE: 10

## 2020-05-08 ENCOUNTER — MYC MEDICAL ADVICE (OUTPATIENT)
Dept: FAMILY MEDICINE | Facility: CLINIC | Age: 45
End: 2020-05-08

## 2020-05-10 ENCOUNTER — MYC REFILL (OUTPATIENT)
Dept: FAMILY MEDICINE | Facility: CLINIC | Age: 45
End: 2020-05-10

## 2020-05-10 ENCOUNTER — MYC REFILL (OUTPATIENT)
Dept: PULMONOLOGY | Facility: CLINIC | Age: 45
End: 2020-05-10

## 2020-05-10 DIAGNOSIS — J45.30 MILD PERSISTENT ASTHMA: ICD-10-CM

## 2020-05-10 DIAGNOSIS — M54.50 CHRONIC MIDLINE LOW BACK PAIN WITHOUT SCIATICA: ICD-10-CM

## 2020-05-10 DIAGNOSIS — G89.29 CHRONIC MIDLINE LOW BACK PAIN WITHOUT SCIATICA: ICD-10-CM

## 2020-05-11 RX ORDER — MONTELUKAST SODIUM 10 MG/1
10 TABLET ORAL EVERY EVENING
Qty: 90 TABLET | Refills: 1 | Status: SHIPPED | OUTPATIENT
Start: 2020-05-11 | End: 2020-11-16

## 2020-05-12 ENCOUNTER — TRANSFERRED RECORDS (OUTPATIENT)
Dept: HEALTH INFORMATION MANAGEMENT | Facility: CLINIC | Age: 45
End: 2020-05-12

## 2020-05-12 ENCOUNTER — NURSE TRIAGE (OUTPATIENT)
Dept: NURSING | Facility: CLINIC | Age: 45
End: 2020-05-12

## 2020-05-12 ENCOUNTER — TELEPHONE (OUTPATIENT)
Dept: NEUROLOGY | Facility: CLINIC | Age: 45
End: 2020-05-12

## 2020-05-13 ENCOUNTER — TELEPHONE (OUTPATIENT)
Dept: NEUROLOGY | Facility: CLINIC | Age: 45
End: 2020-05-13

## 2020-05-13 NOTE — TELEPHONE ENCOUNTER
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 Reports has to wear compression stockings to keep blood up to his brain. Reports his arm is hurting towards the end of the day and if he lifts his arm back to his head, it feels better. Patient thinks if a pressure problem related to his disease. Reports pain goes completely away when he lifts above head. When walking or standing, patient is 4-5/10 and described it as a \"terrible ache\" Doesn't notice the pain when sitting because his legs are elevated. Has appointment with neurologist in July. No fever, swelling, or redness.  Will page provider for 2nd level triage.     RN paged on call provider for [facility] via answering service ([phone]) at 7:27 pm to call patient back directly at [phone].    RN advised patient to phone back nurse line in 30 minutes if no response from on call MD and patient agreed.    Luz Vilchis RN/[facility] Nurse Advisors    Reason for Disposition    [1] Arm pains with exertion (e.g., walking) AND [2] pain goes away on resting AND [3] not present now    Additional Information    Negative: Difficulty breathing or unusual sweating (e.g., sweating without exertion)    Negative: [1] Age > 40 AND [2] associated chest or jaw pain AND [3] pain lasts > 5 minutes    Negative: [1] Age > 40 AND [2] no obvious cause AND [3] pain even when not moving the arm    (Exception: pain is clearly made worse by moving arm or bending neck)    Negative: Shock suspected (e.g., cold/pale/clammy skin, too weak to stand, low BP, rapid pulse)    Negative: [1] Similar pain previously AND [2] it was from \"heart attack\"    Negative: [1] Similar pain previously AND [2] it was from \"angina\" AND [3] not relieved by nitroglycerin    Negative: Sounds like a life-threatening emergency to the triager    Negative: [1] SEVERE pain AND [2] not improved 2 hours after pain medicine    " Negative: [1] Red area or streak AND [2] fever    Negative: [1] Swollen joint AND [2] fever    Negative: Patient sounds very sick or weak to the triager    Negative: [1] Red area or streak AND [2] large (> 2 in. or 5 cm)    Negative: Entire arm is swollen    Negative: [1] Cast on wrist or arm AND [2] now increased pain    Negative: Weakness (i.e., loss of strength) in hand or fingers     (Exception: not truly weak; hand feels weak because of pain)    Protocols used: ARM PAIN-A-AH

## 2020-05-13 NOTE — TELEPHONE ENCOUNTER
Dr. Silver, this patient is scheduled to see you for a consult in July. Please see below. Should I direct him to movement disorder doc? PCP?

## 2020-05-13 NOTE — TELEPHONE ENCOUNTER
Called this evening regarding R arm pain for 2nd night in a row.  States feels better when lifts above his head and better in the mornings and all day.  He denies any swelling or color change in arm.  Otherwise feels well.  I instructed him to call into PCP office tomorrow morning to see if considering ultrasound of that extremity.  If worsening or notices swelling/color change instructed to come to ER.        Kip Angel, DO  Neurology

## 2020-05-13 NOTE — TELEPHONE ENCOUNTER
[facility] Call Center    Phone Message    May a detailed message be left on voicemail: yes     Reason for Call: Symptoms or Concerns     If patient has red-flag symptoms, warm transfer to triage line    Current symptom or concern: right arm in severe pain at night believes blood is not flowing properly     Symptoms have been present for:  5 day(s)    Has patient previously been seen for this? No        Are there any new or worsening symptoms? No    Other: Pt calling in requesting an appointment as soon as possible due to new symptom. Please call back to discuss further     Action Taken: Message routed to:  [facility] neuro     Travel Screening: Not Applicable

## 2020-05-13 NOTE — TELEPHONE ENCOUNTER
"Routing refill request to provider for review/approval because:  Labs out of range:  AST    Requested Prescriptions   Pending Prescriptions Disp Refills     celecoxib (CELEBREX) 200 MG capsule 180 capsule 1     Sig: TAKE 1 CAPSULE 2 TIMES     DAILY AS NEEDED MODERATE   PAIN       NSAID Medications Failed - 5/13/2020  2:38 PM        Failed - Normal AST on file in past 12 months     Recent Labs   Lab Test 01/24/20  1637   AST 60*             Passed - Blood pressure under 140/90 in past 12 months     BP Readings from Last 3 Encounters:   05/01/20 128/82   04/03/20 126/81   03/16/20 120/79                 Passed - Normal ALT on file in past 12 months     Recent Labs   Lab Test 01/24/20  1637   ALT 61             Passed - Recent (12 mo) or future (30 days) visit within the authorizing provider's specialty     Patient has had an office visit with the authorizing provider or a provider within the authorizing providers department within the previous 12 mos or has a future within next 30 days. See \"Patient Info\" tab in inbasket, or \"Choose Columns\" in Meds & Orders section of the refill encounter.              Passed - Patient is age 6-64 years        Passed - Normal CBC on file in past 12 months     Recent Labs   Lab Test 12/27/19  1209   WBC 8.0   RBC 5.20   HGB 14.9   HCT 45.0                    Passed - Medication is active on med list        Passed - Normal serum creatinine on file in past 12 months     Recent Labs   Lab Test 01/24/20  1637   CR 0.81       Ok to refill medication if creatinine is low                   Martell Chaves RN, BSN, PHN    "

## 2020-05-13 NOTE — TELEPHONE ENCOUNTER
1) Please refer to PCP. Severe pain at the right arm at night could be many things, not necessarily neurological.  2) He is NOT appropriate for visit with me in 7/2020. Referral was for dysautonomia. I do not see this diagnosis. Those patients should be evaluated at [facility] and apparently Dr Malone wanted him to be seen there. He was mis-scheduled. Please provided [facility] number. Dr Khan would be most appropriate. Thank you

## 2020-05-13 NOTE — TELEPHONE ENCOUNTER
Called patient and reported the below. He feels that his arm sx is a nerve issue and r/t a diagnosis of POTS? I expressed that Dr. Silver feels he is not the right person to see for the patient's issues.   Per Dr. Malone's most recent note, patient was to f/u with general neurology in [place]; when the patient called [facility] to schedule they told him no and that he had to come to the [facility]. The patient is a bit frustrated with the run around.     Magali or Mara, can either of you shed light on this? I don't see a referral from Dr. aMlone for neuromuscular. I will be cancelling the appointment with Dr. Silver in July.

## 2020-05-13 NOTE — TELEPHONE ENCOUNTER
Pt called back and states he has not rec'd call from on-call neurologist yet at [phone].  2nd page sent @8:06pm via [facility] page  for [facility] neurologist to call pt at above #. Advised pt call back if no call in 20-30 min. Pt voiced understanding and agreement.

## 2020-05-14 RX ORDER — CELECOXIB 200 MG/1
CAPSULE ORAL
Qty: 180 CAPSULE | Refills: 1 | Status: SHIPPED | OUTPATIENT
Start: 2020-05-14 | End: 2020-06-09 | Stop reason: ALTCHOICE

## 2020-05-14 NOTE — TELEPHONE ENCOUNTER
My guess is Dr Malone just wanted routine follow up in [place] for mild peripheral and autonomic neuropathy. The new arm pain is a different issue. He should see his primary about this and if felt to possibly be Neurologic in nature I would be happy to see him

## 2020-05-15 ENCOUNTER — TELEPHONE (OUTPATIENT)
Dept: FAMILY MEDICINE | Facility: CLINIC | Age: 45
End: 2020-05-15

## 2020-05-16 ENCOUNTER — VIRTUAL VISIT (OUTPATIENT)
Dept: FAMILY MEDICINE | Facility: OTHER | Age: 45
End: 2020-05-16

## 2020-05-16 NOTE — PROGRESS NOTES
"Date: 2020 18:49:11  Clinician: Rivera Frye  Clinician NPI: [id number]  Patient: Joel Pineda  Patient : 1975  Patient Address: [address] [place] [address] [place] [address]  Patient Phone: [phone]  Visit Protocol: Ear pain  Patient Summary:  Joel is a 44 year old ( : 1975 ) male who initiated a Visit for swimmer's ear (ear pain). When asked the question \"Please sign me up to receive news, health information and promotions from [facility].\", Joel responded \"Yes\".    Joel reports that his ear pain started 5-7 days ago. The ear pain is located inside both ears.   In addition to the ear pain, Joel is experiencing redness and a feeling of fullness in the ear(s). Joel reports having fluid draining from the ear(s).   Symptom Details     Pain: Joel is experiencing mild pain (1-3 on a 10 point pain scale). It gets worse when he eats or chews. It does not get worse when he gently pulls on the earlobe(s).     Drainage: The color of the fluid coming out of his ear(s) is blood-tinged. The fluid does not have a bad odor.      Joel denies tenderness and itchiness in the ear(s). He also denies recent injuries near the ear(s), feeling feverish, ever having ear tubes, and the possibility of a foreign object in the ear(s).   Precipitating events   Joel denies swimming and flying within the past week.   Pertinent medical history   Weight: 305 lbs   He does not smoke or use smokeless tobacco.   Additional health information pertinent to this Visit as reported by the patient (free text): Immunocompromised, and was previously prescribed Ciprodex and Lotrimim ear drops, which I will resume using.     My main concerns: ongoing bilateral intermittent pain and tinnitus, as well as very atypical left-ear brownish-orange discharge. The pain was the most intense a couple of weeks ago, at which time I placed Hydrogen Peroxide in both ears.   Weight: 305 lbs    MEDICATIONS: Maxalt-MLT oral, " ramipril oral, Seroquel oral, Lyrica oral, oxycodone oral, famotidine oral, omeprazole oral, Lovaza oral, Singulair oral, metoprolol succinate oral, Glucophage XR oral, melatonin oral, hydroxyzine HCl oral, Advair Diskus inhalation, Cymbalta oral, cyanocobalamin (vit B-12) injection, Zyrtec oral, Celebrex oral, Aspirin Low Dose oral, alprazolam oral, Tylenol Extra Strength oral, Ventolin HFA inhalation, Enbrel SureClick subcutaneous, ALLERGIES: ketoconazole, Flagyl, Penicillins  Clinician Response:  Dear Joel,   I am sorry you are not feeling well. To determine the most appropriate care for you, I would like you to be seen in person to further discuss your health history and symptoms.  You will not be charged for this Visit. Thank you for trusting us with your care.   Diagnosis: Refer for additional evaluation  Diagnosis ICD: R69

## 2020-05-17 ENCOUNTER — TRANSFERRED RECORDS (OUTPATIENT)
Dept: HEALTH INFORMATION MANAGEMENT | Facility: CLINIC | Age: 45
End: 2020-05-17

## 2020-05-17 ENCOUNTER — MYC REFILL (OUTPATIENT)
Dept: FAMILY MEDICINE | Facility: CLINIC | Age: 45
End: 2020-05-17

## 2020-05-17 DIAGNOSIS — R11.0 NAUSEA: ICD-10-CM

## 2020-05-17 NOTE — TELEPHONE ENCOUNTER
Forms signed and faxed back to [phone]. Please let provider know if fax was not received. Please scan forms into chart then fax forms to Orthology.   Thank you,  JOCELYN Pro, NP-C  [facility]

## 2020-05-18 ENCOUNTER — MYC MEDICAL ADVICE (OUTPATIENT)
Dept: OTOLARYNGOLOGY | Facility: CLINIC | Age: 45
End: 2020-05-18

## 2020-05-18 ENCOUNTER — OFFICE VISIT (OUTPATIENT)
Dept: FAMILY MEDICINE | Facility: CLINIC | Age: 45
End: 2020-05-18
Payer: MEDICARE

## 2020-05-18 VITALS
WEIGHT: 311 LBS | BODY MASS INDEX: 35.98 KG/M2 | DIASTOLIC BLOOD PRESSURE: 85 MMHG | HEIGHT: 78 IN | HEART RATE: 75 BPM | SYSTOLIC BLOOD PRESSURE: 132 MMHG | OXYGEN SATURATION: 97 % | RESPIRATION RATE: 20 BRPM | TEMPERATURE: 98.2 F

## 2020-05-18 DIAGNOSIS — J30.2 SEASONAL ALLERGIES: Primary | ICD-10-CM

## 2020-05-18 PROBLEM — E11.8 TYPE 2 DIABETES MELLITUS WITH COMPLICATION, WITHOUT LONG-TERM CURRENT USE OF INSULIN (H): Status: ACTIVE | Noted: 2018-12-24

## 2020-05-18 PROCEDURE — 99213 OFFICE O/P EST LOW 20 MIN: CPT | Performed by: FAMILY MEDICINE

## 2020-05-18 RX ORDER — METOCLOPRAMIDE 5 MG/1
5 TABLET ORAL 3 TIMES DAILY PRN
Qty: 90 TABLET | Refills: 1 | Status: SHIPPED | OUTPATIENT
Start: 2020-05-18 | End: 2020-07-29

## 2020-05-18 ASSESSMENT — MIFFLIN-ST. JEOR: SCORE: 2423.94

## 2020-05-18 ASSESSMENT — PAIN SCALES - GENERAL: PAINLEVEL: MILD PAIN (3)

## 2020-05-18 NOTE — PROGRESS NOTES
Subjective     Joel Pineda is a 44 year old male who presents to clinic today for the following health issues:    HPI   Acute Illness   Acute illness concerns: ears  Onset: 5 days    Fever: no    Chills/Sweats: no    Headache (location?): YES    Sinus Pressure:YES    Conjunctivitis:  YES     Ear Pain: YES- bilateral     Rhinorrhea: YES    Congestion: YES    Sore Throat: no     Cough: no    Wheeze: YES    Decreased Appetite: YES    Nausea: YES    Vomiting: no    Diarrhea:  YES    Dysuria/Freq.: no    Fatigue/Achiness: YES    Sick/Strep Exposure: no     Therapies Tried and outcome: hydrogen peroxide, mucinex D , sudafed    Not working currently and stays in place.         Patient Active Problem List   Diagnosis     Major depressive disorder, recurrent episode (H)     Intermittent asthma     Hyperlipidemia LDL goal <100     Chronic nonallergic rhinitis     Diverticulosis     GERD (gastroesophageal reflux disease)     Anxiety     LLOYD (obstructive sleep apnea)- mild (AHI 11)     Intracranial arachnoid cyst     Facet arthritis of cervical region     Acquired hypothyroidism     Bipolar 2 disorder (H)     Chronic midline low back pain without sciatica     Irritable bowel syndrome with diarrhea     B12 deficiency     Essential hypertension with goal blood pressure less than 140/90     Chronic pain syndrome     Ankylosing spondylitis of sacral region (H)     Morbid obesity due to hypertriglyceridemia (H)     Fatty infiltration of liver     DDD (degenerative disc disease), lumbar     Type 2 diabetes mellitus with complication, without long-term current use of insulin (H)     Peripheral polyneuropathy     History of pulmonary embolism     Ingrown toenail     Lipoma of skin and subcutaneous tissue     Hypertriglyceridemia     Gastroparesis     Orthostatic dizziness     POTS (postural orthostatic tachycardia syndrome)     Past Surgical History:   Procedure Laterality Date     BACK SURGERY  10/07    lumbar discectomy L5-S1      COLONOSCOPY      Note: colonoscopy scheduled with [facility] on Friday, 9/4/15     COSMETIC SURGERY  2012    Nose Exterior - functional     GI SURGERY  August 2013    Sigmoidectomy     HERNIA REPAIR, UMBILICAL  8/23/11    henok, Dr. Evan Beavers     INCISION AND DRAINAGE, ABSCESS, COMPLEX  8/23/11    umbilical, Dr. Evan Beavers     LAPAROSCOPIC ASSISTED COLECTOMY LEFT (DESCENDING)  8/15/2013    Procedure: LAPAROSCOPIC ASSISTED COLECTOMY LEFT (DESCENDING);  Laparoscopic Hand Assisted Sigmoid Resection, Mobilization of Splenic Fissure, coloproctoscopy, *Latex Free Room* Anesthesia General with Pain block  ;  Surgeon: Aurora Justice MD;  Location: [facility] OR     NERVE SURGERY  8/18/11    RF ablation @ L3-S1 @ [facility]     RECONSTRUCT NOSE AND SEPTUM (FUNCTIONAL)  10/14/2011    Procedure:RECONSTRUCT NOSE AND SEPTUM (FUNCTIONAL); Functional Septorhinoplasty, Turbinate Reduction, ; Surgeon:CEDRIC CUEVAS; Location:[facility] OR     SINUS SURGERY  10/1/01    ethmoidectomy chronic sinusitis       Social History     Tobacco Use     Smoking status: Never Smoker     Smokeless tobacco: Never Used   Substance Use Topics     Alcohol use: Yes     Alcohol/week: 0.0 standard drinks     Drinks per session: 1 or 2     Binge frequency: Weekly     Comment: occ 1/week     Family History   Problem Relation Age of Onset     Musculoskeletal Disorder Mother         back     Anxiety Disorder Mother      Colon Polyps Mother      Ulcerative Colitis Mother         and ischemic small intestine, surgery     Hypertension Mother      Breast Cancer Mother      Osteoporosis Mother      Diabetes Mother         Type 2, Diagnosed in 2014     Depression Mother         Takes Cymbalta to help with chronic pain + depx     Thyroid Disease Mother         Hypothyroidism     Obesity Mother         Under much better control latter half of 2015     Musculoskeletal Disorder Father         back     Substance Abuse Father      Hypertension Father      Hyperlipidemia Father       "Depression Father         Off meds for many years. Seems \"ok\"     Heart Disease Maternal Grandmother      Heart Disease Maternal Grandfather      Psychotic Disorder Paternal Grandfather      Suicide Paternal Grandfather      Depression Paternal Grandfather         Pediatrician. Committed suicide by pistol in 1990.     Musculoskeletal Disorder Brother         back     Depression Brother         Expressed as anger and moodiness     Substance Abuse Sister      Depression Sister         Mental Health Therapist, yet no anti-depressants?     Anxiety Disorder Sister         Mental Health Therapist, yet no anti-anxiety meds?     Other Cancer Other         Bladder Cancer - Fatal     Substance Abuse Brother      Colon Cancer No family hx of      Crohn's Disease No family hx of      Anesthesia Reaction No family hx of      Cancer No family hx of         No family history of skin cancer         Current Outpatient Medications   Medication Sig Dispense Refill     acetaminophen 500 MG CAPS Take 500 mg by mouth every 4 hours as needed 60 capsule      albuterol (PROAIR HFA/PROVENTIL HFA/VENTOLIN HFA) 108 (90 Base) MCG/ACT inhaler Inhale 2 puffs into the lungs every 4 hours as needed for shortness of breath / dyspnea or wheezing 8.5 g 11     albuterol (PROVENTIL) (2.5 MG/3ML) 0.083% neb solution Take 1 vial (2.5 mg) by nebulization every 6 hours as needed for shortness of breath / dyspnea or wheezing 200 mL 3     ALPRAZolam (XANAX XR) 0.5 MG 24 hr tablet Take 0.5 mg by mouth 3 times daily as needed        aspirin (ASA) 81 MG tablet Take 81 mg by mouth daily        celecoxib (CELEBREX) 200 MG capsule TAKE 1 CAPSULE 2 TIMES     DAILY AS NEEDED MODERATE   PAIN 180 capsule 1     cetirizine (ZYRTEC) 10 MG tablet Take 1 tablet (10 mg) by mouth At Bedtime 30 tablet 11     cholecalciferol (VITAMIN D3) 45992 units (1250 mcg) capsule Take one capsule every two weeks. 24 capsule 1     cyanocobalamin (CYANOCOBALAMIN) 1000 MCG/ML injection Inject " 1 mL (1,000 mcg) into the muscle every 30 days 10 mL 0     DULoxetine (CYMBALTA) 60 MG capsule Take 60 mg by mouth 2 times daily        EPINEPHrine (EPIPEN/ADRENACLICK/OR ANY BX GENERIC EQUIV) 0.3 MG/0.3ML injection 2-pack Inject 0.3 mLs (0.3 mg) into the muscle once as needed for anaphylaxis 0.6 mL 3     etanercept (ENBREL SURECLICK) 50 MG/ML autoinjector Inject 50 mg Subcutaneous once a week Hold for signs of infection, and seek medical attention. 4 mL 5     famotidine (PEPCID) 20 MG tablet Prior to administration of Humira every 2 weeks       fluticasone-salmeterol (ADVAIR) 500-50 MCG/DOSE inhaler Inhale 1 puff into the lungs every 12 hours 3 Inhaler 3     Ginger, Zingiber officinalis, (GINGER ROOT) 550 MG CAPS capsule Take 550 mg by mouth Up to three times daily       hydrOXYzine (ATARAX) 50 MG tablet Take 50 mg by mouth 2 times daily For anxiety and insomnia       ketotifen (ZADITOR) 0.025 % ophthalmic solution 1 drop 2 times daily       lamoTRIgine (LAMICTAL) 100 MG tablet Take 200 mg by mouth daily       levothyroxine (SYNTHROID/LEVOTHROID) 75 MCG tablet TAKE 1 TABLET EVERY MORNING 90 tablet 1     Liniments (SALONPAS EX) Externally apply 1 Application topically daily as needed       melatonin 3 MG tablet Take 6 mg by mouth nightly as needed for sleep       metFORMIN (GLUCOPHAGE-XR) 500 MG 24 hr tablet Take 2 tablets (1,000 mg) by mouth daily (with dinner) 180 tablet 1     metoprolol succinate ER (TOPROL-XL) 200 MG 24 hr tablet Take 1 tablet (200 mg) by mouth 2 times daily 180 tablet 0     montelukast (SINGULAIR) 10 MG tablet Take 1 tablet (10 mg) by mouth every evening 90 tablet 1     omega-3 acid ethyl esters (LOVAZA) 1 g capsule TAKE 2 CAPSULES BY MOUTH TWICE DAILY. 360 capsule 2     omeprazole 20 MG tablet Take 20 mg by mouth daily        order for DME Jobst thigh high hose:  30-40 mmHg    Equipment being ordered: Jobst thigh high hose 30-40 mmHg 1 Units 1     order for DME Equipment being ordered:  lumbosacral belt/brace 1 Units 0     order for DME Respironics REMSTAR 60 Series Auto CPAP 9-13 cm H2O, Wisp nasal mask w/a large cushion and a chinstrap       oxyCODONE (ROXICODONE) 5 MG tablet Take 1-2 tablets (5-10 mg) by mouth every 6 hours as needed for pain (maximum 4 tablet(s) per day) 35 tablet 0     pregabalin (LYRICA) 150 MG capsule Take 1 capsule (150 mg) by mouth 2 times daily (Patient taking differently: Take 150 mg by mouth 3 times daily ) 60 capsule 5     QUEtiapine (SEROQUEL) 25 MG tablet Take 25 mg by mouth 4 times daily as needed       ramipril (ALTACE) 10 MG capsule Take 1 capsule (10 mg) by mouth daily 90 capsule 1     rizatriptan (MAXALT-MLT) 5 MG ODT Take 1 tablet (5 mg) by mouth at onset of headache for migraine 30 tablet 5     rosuvastatin (CRESTOR) 40 MG tablet TAKE 1 TABLET DAILY 90 tablet 1     syringe, disposable, (BD TUBERCULIN SYRINGE) 1 ML MISC Equipment being ordered: 1 ml tuberculin syringes to be used for Vitamin B12 injections. 12 each 11     vitamin C (ASCORBIC ACID) 500 MG tablet Take 500 mg by mouth daily       metoclopramide (REGLAN) 5 MG tablet Take 1 tablet (5 mg) by mouth 3 times daily as needed (nausea) 90 tablet 1     Allergies   Allergen Reactions     Amoxicillin-Pot Clavulanate Difficulty breathing     Banana Shortness Of Breath     Pt reports organic Banana is okay.      Nitroglycerin Palpitations     Penicillins Anaphylaxis     Provigil [Modafinil] Shortness Of Breath     headache     Gadolinium Hives and Itching     Patient was premedicated for the contrast allergy. He did still have a reaction a few hours after injection. Hives and itching. Dr. Gomez told tech to inform pt he should only have contrast again in the future when premedicated and at a hospital. Not at an outpatient facility.      Ketoconazole      Topical cream caused swelling and itching     Dye [Contrast Dye] Other (See Comments) and Hives     Moderate flushing, CT contrast     Golimumab       "Hives, bradycardia, face swelling     Neurontin [Gabapentin] Hives     Moderate hives     Nortriptyline Hives     Varicella Virus Vaccine Live      Rash     Flagyl [Metronidazole Hcl] Palpitations and Hives     Latex Rash     Metronidazole Palpitations, Other (See Comments) and Rash     dizziness (versus ciprofloxacin taken at same time)     No Clinical Screening - See Comments Rash     Nitrile gloves     Recent Labs   Lab Test 01/24/20  1637 09/26/19  1010 09/13/19  0943 08/08/19  1658 07/21/19  1954  03/19/19  0807  12/24/18  1114   A1C 6.0*  --  5.9*  --   --   --  6.1*  --  6.7*   LDL  --   --  Cannot estimate LDL when triglyceride exceeds 400 mg/dL  64  --   --   --  Cannot estimate LDL when triglyceride exceeds 400 mg/dL  53  --  Cannot estimate LDL when triglyceride exceeds 400 mg/dL   HDL  --   --  33*  --   --   --  28*  --  34*   TRIG  --   --  468*  --   --   --  583*  --  434*   ALT 61 53  --   --  49   < > 51   < >  --    CR 0.81 0.83  --  0.88 0.84   < > 1.00   < >  --    GFRESTIMATED >90 >90  --  >90 >90   < > >90   < >  --    GFRESTBLACK >90 >90  --  >90 >90   < > >90   < >  --    POTASSIUM 4.4  --   --  4.5 4.3   < > 4.3   < >  --    TSH 2.17  --   --   --   --   --   --   --  2.63    < > = values in this interval not displayed.      BP Readings from Last 3 Encounters:   05/18/20 132/85   05/01/20 128/82   04/03/20 126/81    Wt Readings from Last 3 Encounters:   05/18/20 141.1 kg (311 lb)   05/01/20 141.1 kg (311 lb)   04/03/20 142.9 kg (315 lb)                      Reviewed and updated as needed this visit by Provider  Problems         Review of Systems   Constitutional, HEENT, cardiovascular, pulmonary, gi and gu systems are negative, except as otherwise noted.      Objective    /85 (BP Location: Right arm, Patient Position: Chair, Cuff Size: Adult Large)   Pulse 75   Temp 98.2  F (36.8  C) (Oral)   Resp 20   Ht 1.981 m (6' 6\")   Wt 141.1 kg (311 lb)   SpO2 97%   BMI 35.94 kg/m  "   Body mass index is 35.94 kg/m .  Physical Exam   GENERAL APPEARANCE: healthy, alert and no distress  EYES: Eyes grossly normal to inspection, PERRL and conjunctivae and sclerae normal  HENT: ear canals and TM's normal, nose and mouth without ulcers or lesions, oropharynx clear and oral mucous membranes moist  NECK: no adenopathy, no asymmetry, masses, or scars and thyroid normal to palpation  RESP: lungs clear to auscultation - no rales, rhonchi or wheezes  CV: regular rates and rhythm, normal S1 S2, no S3 or S4, no murmur, click or rub, no peripheral edema and peripheral pulses strong  ABDOMEN: soft, nontender, no hepatosplenomegaly, no masses and bowel sounds normal  MS: no musculoskeletal defects are noted and gait is age appropriate without ataxia  SKIN: no suspicious lesions or rashes  NEURO: Normal strength and tone, sensory exam grossly normal, mentation intact and speech normal  PSYCH: mentation appears normal and affect normal/bright     Diagnostic Test Results:  Labs reviewed in Epic  No results found. However, due to the size of the patient record, not all encounters were searched. Please check Results Review for a complete set of results.        Assessment & Plan       ICD-10-CM    1. Seasonal allergies  J30.2 Recommend not using antibiotic ear drops at this time. Ear canals look normal. Some retraction of TM's and would benefit from antihistamine use over the counter, saline nose spray or rinse, clean furnace filters. Call if these measures do not improve symptoms.           FUTURE APPOINTMENTS:       - Follow-up visit in 3 months or sooner if any questions or concerns.   Work on weight loss  Regular exercise    No follow-ups on file.    Judy Suarez MD  [facility]

## 2020-05-19 ENCOUNTER — TELEPHONE (OUTPATIENT)
Dept: PULMONOLOGY | Facility: CLINIC | Age: 45
End: 2020-05-19

## 2020-05-19 ASSESSMENT — ASTHMA QUESTIONNAIRES: ACT_TOTALSCORE: 20

## 2020-05-19 NOTE — PATIENT INSTRUCTIONS
Patient Education     Allergic Rhinitis  Allergic rhinitis is an allergic reaction that affects the nose, and often the eyes. It s often known as nasal allergies. Nasal allergies are often due to things in the environment that are breathed in. Depending what you are sensitive to, nasal allergies may occur only during certain seasons. Or they may occur year round. Common indoor allergens include house dust mites, mold, cockroaches, and pet dander. Outdoor allergens include pollen from trees, grasses, and weeds.   Symptoms include a drippy, stuffy, and itchy nose. They also include sneezing and red and itchy eyes. You may feel tired more often. Severe allergies may also affect your breathing and trigger a condition called asthma.   Tests can be done to see what allergens are affecting you. You may be referred to an allergy specialist for testing and further evaluation.  Home care  Your healthcare provider may prescribe medicines to help relieve allergy symptoms. These may include oral medicines, nasal sprays, or eye drops.  Ask your provider for advice on how to avoid substances that you are allergic to. Below are a few tips for each type of allergen.  Pet dander:    Do not have pets with fur and feathers.    If you can't avoid having a pet, keep it out of your bedroom and off upholstered furniture.  Pollen:    When pollen counts are high, keep windows of your car and home closed. If possible, use an air conditioner instead.    Wear a filter mask when mowing or doing yard work.  House dust mites:    Wash bedding every week in warm water and detergent and dry on a hot setting.    Cover the mattress, box spring, and pillows with allergy covers.     If possible, sleep in a room with no carpet, curtains, or upholstered furniture.  Cockroaches:    Store food in sealed containers.    Remove garbage from the home promptly.    Fix water leaks  Mold:    Keep humidity low by using a dehumidifier or air conditioner. Keep the  dehumidifier and air conditioner clean and free of mold.    Clean moldy areas with bleach and water.  In general:    Vacuum once or twice a week. If possible, use a vacuum with a high-efficiency particulate air (HEPA) filter.    Do not smoke. Avoid cigarette smoke. Cigarette smoke is an irritant that can make symptoms worse.  Follow-up care  Follow up as advised by the healthcare provider or our staff. If you were referred to an allergy specialist, make this appointment promptly.  When to seek medical advice  Call your healthcare provider right away if the following occur:    Coughing or wheezing    Fever of 100.4 F (38 C) or higher, or as directed by your healthcare provider    Raised red bumps (hives)    Continuing symptoms, new symptoms, or worsening symptoms  Call 911 if you have:    Trouble breathing    Severe swelling of the face or severe itching of the eyes or mouth  Date Last Reviewed: 3/1/2017    2205-3970 [employer]. [address] [place] [address]. All rights reserved. This information is not intended as a substitute for professional medical care. Always follow your healthcare professional's instructions.           Patient Education     Anatomy of the Inner Ear    There are two parts of the inner ear. One part (hearing canal) is for hearing. The other part (balance canal) is for balance.  The canals are filled with a fluid called endolymph. The level of this fluid is maintained by a small organ called the endolymphatic sac. In the hearing canal, sound waves cause vibrations in the endolymph. The inner ear detects these sound waves and sends nerve impulses to the brain. The sound we hear is a result of the brain's interpretation of these nerve impulses.  In the balance canals, change in position causes movement of the fluid. This movement is detected in the balance portion of the inner ear, and nerve impulses are sent to the brain.  The endolymphatic sac keeps inner ear fluid at a  constant level. The balance canals collect balance information. The hearing canal collects sound information. The hearing and balance nerves carry information to the brain from both parts of the inner ear.  Date Last Reviewed: 10/1/2016    [id number] [employer]. [address] [place] [address], [place] [address]. All rights reserved. This information is not intended as a substitute for professional medical care. Always follow your healthcare professional's instructions.

## 2020-05-19 NOTE — TELEPHONE ENCOUNTER
Patient is scheduled for pulmonary follow up 06-. D/t COVID 19 this office visit must be converted to video/telephone visit.    Informed patient of this information. Patient requesting to convert office visit into video visit. Pulmonary follow up has been converted to virtual visit.    Slime Szymanski LPN    Rheumatology / Pulmonology  [facility]

## 2020-05-20 ENCOUNTER — MYC MEDICAL ADVICE (OUTPATIENT)
Dept: FAMILY MEDICINE | Facility: CLINIC | Age: 45
End: 2020-05-20

## 2020-05-22 ENCOUNTER — OFFICE VISIT (OUTPATIENT)
Dept: OTOLARYNGOLOGY | Facility: CLINIC | Age: 45
End: 2020-05-22
Payer: MEDICARE

## 2020-05-22 ENCOUNTER — OFFICE VISIT (OUTPATIENT)
Dept: AUDIOLOGY | Facility: CLINIC | Age: 45
End: 2020-05-22
Payer: MEDICARE

## 2020-05-22 VITALS
DIASTOLIC BLOOD PRESSURE: 83 MMHG | WEIGHT: 305 LBS | SYSTOLIC BLOOD PRESSURE: 130 MMHG | HEART RATE: 80 BPM | BODY MASS INDEX: 35.25 KG/M2 | OXYGEN SATURATION: 98 %

## 2020-05-22 DIAGNOSIS — H93.8X3 EAR FULLNESS, BILATERAL: ICD-10-CM

## 2020-05-22 DIAGNOSIS — Z53.9 ERRONEOUS ENCOUNTER--DISREGARD: Primary | ICD-10-CM

## 2020-05-22 DIAGNOSIS — H60.391 CHRONIC BACTERIAL OTITIS EXTERNA, RIGHT: Primary | ICD-10-CM

## 2020-05-22 PROCEDURE — 99214 OFFICE O/P EST MOD 30 MIN: CPT | Performed by: OTOLARYNGOLOGY

## 2020-05-22 NOTE — LETTER
5/22/2020         RE: Joel Pineda  [address] [facility] [place] [address]        Dear Colleague,    Thank you for referring your patient, Joel Pinead, to the [facility]. Please see a copy of my visit note below.    History of Present Illness - Joel Pineda (Mickey) is a 44 year old male last seen 5/17/2019, and previously seen in 2018 for RIGHT sided hear and orbital pain following dental surgery.  I felt that it was acute temporomandibular disease.  Although, he did have a history of functional endoscopic sinus surgery.  He had an ethmoidectomy in 2002, and rhinoplasty in 2010.  But no previous ear surgery.  He has gastroparesis, POTS, and autonomic insufficiency.    He had come back in May 2019 for ear symptoms.  He tells me that since October of 2018 he has had recurrent ear infections.  He has been on Humira for his ankylosing spondylitis.  He tells me that he has had some ear canal infections, but also thinks he has had fluid in the middle ear as well.  He has been treated for these by urgent care, PCP, and [facility] as well.    Due to likely chronic otitis externa due to immunosuppression, I placed him on long term suppression with floxin and clotrimazole twice weekly long term.    He as come in with questions about worsening eustachian tube dysfunction and fullness in the ears.  But also some more nasal congestion  He is using Zyrtec and neilmed sinus rinse more recently.  He has known TMJ and uses a .    Past Medical History -   Patient Active Problem List   Diagnosis     Major depressive disorder, recurrent episode (H)     Intermittent asthma     Hyperlipidemia LDL goal <100     Chronic nonallergic rhinitis     Diverticulosis     GERD (gastroesophageal reflux disease)     Anxiety     LLOYD (obstructive sleep apnea)- mild (AHI 11)     Intracranial arachnoid cyst     Facet arthritis of cervical region     Acquired hypothyroidism     Bipolar 2 disorder (H)     Chronic  midline low back pain without sciatica     Irritable bowel syndrome with diarrhea     B12 deficiency     Essential hypertension with goal blood pressure less than 140/90     Chronic pain syndrome     Ankylosing spondylitis of sacral region (H)     Morbid obesity due to hypertriglyceridemia (H)     Fatty infiltration of liver     DDD (degenerative disc disease), lumbar     Type 2 diabetes mellitus with complication, without long-term current use of insulin (H)     Peripheral polyneuropathy     History of pulmonary embolism     Ingrown toenail     Lipoma of skin and subcutaneous tissue     Hypertriglyceridemia     Gastroparesis     Orthostatic dizziness     POTS (postural orthostatic tachycardia syndrome)       Current Medications -   Current Outpatient Medications:      acetaminophen 500 MG CAPS, Take 500 mg by mouth every 4 hours as needed, Disp: 60 capsule, Rfl:      albuterol (PROAIR HFA/PROVENTIL HFA/VENTOLIN HFA) 108 (90 Base) MCG/ACT inhaler, Inhale 2 puffs into the lungs every 4 hours as needed for shortness of breath / dyspnea or wheezing, Disp: 8.5 g, Rfl: 11     albuterol (PROVENTIL) (2.5 MG/3ML) 0.083% neb solution, Take 1 vial (2.5 mg) by nebulization every 6 hours as needed for shortness of breath / dyspnea or wheezing, Disp: 200 mL, Rfl: 3     ALPRAZolam (XANAX XR) 0.5 MG 24 hr tablet, Take 0.5 mg by mouth 3 times daily as needed , Disp: , Rfl:      aspirin (ASA) 81 MG tablet, Take 81 mg by mouth daily , Disp: , Rfl:      celecoxib (CELEBREX) 200 MG capsule, TAKE 1 CAPSULE 2 TIMES     DAILY AS NEEDED MODERATE   PAIN, Disp: 180 capsule, Rfl: 1     cetirizine (ZYRTEC) 10 MG tablet, Take 1 tablet (10 mg) by mouth At Bedtime, Disp: 30 tablet, Rfl: 11     cholecalciferol (VITAMIN D3) 57514 units (1250 mcg) capsule, Take one capsule every two weeks., Disp: 24 capsule, Rfl: 1     cyanocobalamin (CYANOCOBALAMIN) 1000 MCG/ML injection, Inject 1 mL (1,000 mcg) into the muscle every 30 days, Disp: 10 mL, Rfl: 0      DULoxetine (CYMBALTA) 60 MG capsule, Take 60 mg by mouth 2 times daily , Disp: , Rfl:      EPINEPHrine (EPIPEN/ADRENACLICK/OR ANY BX GENERIC EQUIV) 0.3 MG/0.3ML injection 2-pack, Inject 0.3 mLs (0.3 mg) into the muscle once as needed for anaphylaxis, Disp: 0.6 mL, Rfl: 3     etanercept (ENBREL SURECLICK) 50 MG/ML autoinjector, Inject 50 mg Subcutaneous once a week Hold for signs of infection, and seek medical attention., Disp: 4 mL, Rfl: 5     famotidine (PEPCID) 20 MG tablet, Prior to administration of Humira every 2 weeks, Disp: , Rfl:      fluticasone-salmeterol (ADVAIR) 500-50 MCG/DOSE inhaler, Inhale 1 puff into the lungs every 12 hours, Disp: 3 Inhaler, Rfl: 3     Ginger, Zingiber officinalis, (GINGER ROOT) 550 MG CAPS capsule, Take 550 mg by mouth Up to three times daily, Disp: , Rfl:      hydrOXYzine (ATARAX) 50 MG tablet, Take 50 mg by mouth 2 times daily For anxiety and insomnia, Disp: , Rfl:      ketotifen (ZADITOR) 0.025 % ophthalmic solution, 1 drop 2 times daily, Disp: , Rfl:      lamoTRIgine (LAMICTAL) 100 MG tablet, Take 200 mg by mouth daily, Disp: , Rfl:      levothyroxine (SYNTHROID/LEVOTHROID) 75 MCG tablet, TAKE 1 TABLET EVERY MORNING, Disp: 90 tablet, Rfl: 1     Liniments (SALONPAS EX), Externally apply 1 Application topically daily as needed, Disp: , Rfl:      melatonin 3 MG tablet, Take 6 mg by mouth nightly as needed for sleep, Disp: , Rfl:      metFORMIN (GLUCOPHAGE-XR) 500 MG 24 hr tablet, Take 2 tablets (1,000 mg) by mouth daily (with dinner), Disp: 180 tablet, Rfl: 1     metoclopramide (REGLAN) 5 MG tablet, Take 1 tablet (5 mg) by mouth 3 times daily as needed (nausea), Disp: 90 tablet, Rfl: 1     metoprolol succinate ER (TOPROL-XL) 200 MG 24 hr tablet, Take 1 tablet (200 mg) by mouth 2 times daily, Disp: 180 tablet, Rfl: 0     montelukast (SINGULAIR) 10 MG tablet, Take 1 tablet (10 mg) by mouth every evening, Disp: 90 tablet, Rfl: 1     omega-3 acid ethyl esters (LOVAZA) 1 g capsule,  TAKE 2 CAPSULES BY MOUTH TWICE DAILY., Disp: 360 capsule, Rfl: 2     omeprazole 20 MG tablet, Take 20 mg by mouth daily , Disp: , Rfl:      order for DME, Jobst thigh high hose:  30-40 mmHg  Equipment being ordered: Jobst thigh high hose 30-40 mmHg, Disp: 1 Units, Rfl: 1     order for DME, Equipment being ordered: lumbosacral belt/brace, Disp: 1 Units, Rfl: 0     order for DME, Respironics REMSTAR 60 Series Auto CPAP 9-13 cm H2O, Wisp nasal mask w/a large cushion and a chinstrap, Disp: , Rfl:      oxyCODONE (ROXICODONE) 5 MG tablet, Take 1-2 tablets (5-10 mg) by mouth every 6 hours as needed for pain (maximum 4 tablet(s) per day), Disp: 35 tablet, Rfl: 0     pregabalin (LYRICA) 150 MG capsule, Take 1 capsule (150 mg) by mouth 2 times daily (Patient taking differently: Take 150 mg by mouth 3 times daily ), Disp: 60 capsule, Rfl: 5     QUEtiapine (SEROQUEL) 25 MG tablet, Take 25 mg by mouth 4 times daily as needed, Disp: , Rfl:      ramipril (ALTACE) 10 MG capsule, Take 1 capsule (10 mg) by mouth daily, Disp: 90 capsule, Rfl: 1     rizatriptan (MAXALT-MLT) 5 MG ODT, Take 1 tablet (5 mg) by mouth at onset of headache for migraine, Disp: 30 tablet, Rfl: 5     rosuvastatin (CRESTOR) 40 MG tablet, TAKE 1 TABLET DAILY, Disp: 90 tablet, Rfl: 1     syringe, disposable, (BD TUBERCULIN SYRINGE) 1 ML MISC, Equipment being ordered: 1 ml tuberculin syringes to be used for Vitamin B12 injections., Disp: 12 each, Rfl: 11     vitamin C (ASCORBIC ACID) 500 MG tablet, Take 500 mg by mouth daily, Disp: , Rfl:     Allergies -   Allergies   Allergen Reactions     Amoxicillin-Pot Clavulanate Difficulty breathing     Banana Shortness Of Breath     Pt reports organic Banana is okay.      Nitroglycerin Palpitations     Penicillins Anaphylaxis     Provigil [Modafinil] Shortness Of Breath     headache     Gadolinium Hives and Itching     Patient was premedicated for the contrast allergy. He did still have a reaction a few hours after  injection. Hives and itching. Dr. Gomez told tech to inform pt he should only have contrast again in the future when premedicated and at a hospital. Not at an outpatient facility.      Ketoconazole      Topical cream caused swelling and itching     Dye [Contrast Dye] Other (See Comments) and Hives     Moderate flushing, CT contrast     Golimumab      Hives, bradycardia, face swelling     Neurontin [Gabapentin] Hives     Moderate hives     Nortriptyline Hives     Varicella Virus Vaccine Live      Rash     Flagyl [Metronidazole Hcl] Palpitations and Hives     Latex Rash     Metronidazole Palpitations, Other (See Comments) and Rash     dizziness (versus ciprofloxacin taken at same time)     No Clinical Screening - See Comments Rash     Nitrile gloves       Social History -   Social History     Socioeconomic History     Marital status: Single     Spouse name: Not on file     Number of children: Not on file     Years of education: Not on file     Highest education level: Not on file   Occupational History     Occupation:      Employer: [employer]     Occupation: paraprofessional     Comment: [employer]     Employer: [employer]   Social Needs     Financial resource strain: Not on file     Food insecurity:     Worry: Not on file     Inability: Not on file     Transportation needs:     Medical: Not on file     Non-medical: Not on file   Tobacco Use     Smoking status: Never Smoker     Smokeless tobacco: Never Used   Substance and Sexual Activity     Alcohol use: Yes     Alcohol/week: 0.0 oz     Drinks per session: 1 or 2     Binge frequency: Weekly     Comment: occ 1/week     Drug use: No     Sexual activity: Never     Partners: Female   Lifestyle     Physical activity:     Days per week: Not on file     Minutes per session: Not on file     Stress: Not on file   Relationships     Social connections:     Talks on phone: Not on file     Gets together: Not on file     Attends Sabianist service: Not on  "file     Active member of club or organization: Not on file     Attends meetings of clubs or organizations: Not on file     Relationship status: Not on file     Intimate partner violence:     Fear of current or ex partner: Not on file     Emotionally abused: Not on file     Physically abused: Not on file     Forced sexual activity: Not on file   Other Topics Concern     Parent/sibling w/ CABG, MI or angioplasty before 65F 55M? No      Service Not Asked     Blood Transfusions Not Asked     Caffeine Concern Not Asked     Occupational Exposure Not Asked     Hobby Hazards Not Asked     Sleep Concern Yes     Stress Concern Yes     Weight Concern Not Asked     Special Diet Not Asked     Back Care Yes     Exercise Not Asked     Bike Helmet Not Asked     Seat Belt Yes     Self-Exams Not Asked   Social History Narrative     Not on file       Family History -   Family History   Problem Relation Age of Onset     Musculoskeletal Disorder Mother         back     Anxiety Disorder Mother      Colon Polyps Mother      Ulcerative Colitis Mother         and ischemic small intestine, surgery     Hypertension Mother      Breast Cancer Mother      Osteoporosis Mother      Diabetes Mother         Type 2, Diagnosed in 2014     Depression Mother         Takes Cymbalta to help with chronic pain + depx     Thyroid Disease Mother         Hypothyroidism     Obesity Mother         Under much better control latter half of 2015     Musculoskeletal Disorder Father         back     Substance Abuse Father      Hypertension Father      Hyperlipidemia Father      Depression Father         Off meds for many years. Seems \"ok\"     Heart Disease Maternal Grandmother      Heart Disease Maternal Grandfather      Psychotic Disorder Paternal Grandfather      Suicide Paternal Grandfather      Depression Paternal Grandfather         Pediatrician. Committed suicide by pistol in 1990.     Musculoskeletal Disorder Brother         back     Depression " Brother         Expressed as anger and moodiness     Substance Abuse Sister      Depression Sister         Mental Health Therapist, yet no anti-depressants?     Anxiety Disorder Sister         Mental Health Therapist, yet no anti-anxiety meds?     Other Cancer Other         Bladder Cancer - Fatal     Substance Abuse Brother      Colon Cancer No family hx of      Crohn's Disease No family hx of      Anesthesia Reaction No family hx of      Cancer No family hx of         No family history of skin cancer       Review of Systems - As per HPI and PMHx, otherwise 10+ system review of the head and neck, and general constitution is negative.    Physical Exam  /83   Pulse 80   Wt 138.3 kg (305 lb)   SpO2 98%   BMI 35.25 kg/m      General - The patient is well nourished and well developed, and appears to have good nutritional status.  Alert and oriented to person and place, answers questions and cooperates with examination appropriately.   Head and Face - Normocephalic and atraumatic, with no gross asymmetry noted of the contour of the facial features.  The facial nerve is intact, with strong symmetric movements.  Voice and Breathing - The patient was breathing comfortably without the use of accessory muscles. There was no wheezing, stridor, or stertor.  The patients voice was clear and strong, and had appropriate pitch and quality.  Ears - The tympanic membranes are normal in appearance, bony landmarks are intact.  No retraction, perforation, or masses.  No fluid or purulence was seen in the external canal or the middle ear. No evidence of infection of the middle ear or external canal, cerumen was normal in appearance.  Eyes - Extraocular movements intact, and the pupils were reactive to light.  Sclera were not icteric or injected, conjunctiva were pink and moist.  Mouth - Examination of the oral cavity showed pink, healthy oral mucosa. No lesions or ulcerations noted.  The tongue was mobile and midline, and the  dentition were in good condition.    Throat - The walls of the oropharynx were smooth, pink, moist, symmetric, and had no lesions or ulcerations.  The tonsillar pillars and soft palate were symmetric.  The uvula was midline on elevation.    Neck - Normal midline excursion of the laryngotracheal complex during swallowing.  Full range of motion on passive movement.  Palpation of the occipital, submental, submandibular, internal jugular chain, and supraclavicular nodes did not demonstrate any abnormal lymph nodes or masses.  The carotid pulse was palpable bilaterally.  Palpation of the thyroid was soft and smooth, with no nodules or goiter appreciated.  The trachea was mobile and midline.  Nose - External contour is symmetric, no gross deflection or scars.  Nasal mucosa is pink and moist with no abnormal mucus.  The septum was midline and non-obstructive, turbinates of normal size and position.  No polyps, masses, or purulence noted on examination.      A/P - Joel Pineda is a 44 year old male    (H60.391) Chronic bacterial otitis externa, right  (primary encounter diagnosis)  (H93.8X3) Ear fullness, bilateral    The ears look great, no infections, so hold off on the preventive ear drops for now.    The ear pain is likely a return temporomandibular disease.  I will refer him again to [facility] for a refresher on the TMJ therapy.    For the nose, it looks healthy today.  I would not recommend any more medications, continue Zyrtec.     For his very dry throat, it is likely the atarax he uses for sleep.      Again, thank you for allowing me to participate in the care of your patient.        Sincerely,        Chico Jimenez MD

## 2020-05-22 NOTE — PROGRESS NOTES
History of Present Illness - Joel (TitoRadha Pineda is a 44 year old male last seen 5/17/2019, and previously seen in 2018 for RIGHT sided hear and orbital pain following dental surgery.  I felt that it was acute temporomandibular disease.  Although, he did have a history of functional endoscopic sinus surgery.  He had an ethmoidectomy in 2002, and rhinoplasty in 2010.  But no previous ear surgery.  He has gastroparesis, POTS, and autonomic insufficiency.    He had come back in May 2019 for ear symptoms.  He tells me that since October of 2018 he has had recurrent ear infections.  He has been on Humira for his ankylosing spondylitis.  He tells me that he has had some ear canal infections, but also thinks he has had fluid in the middle ear as well.  He has been treated for these by urgent care, PCP, and Kj Lyles as well.    Due to likely chronic otitis externa due to immunosuppression, I placed him on long term suppression with floxin and clotrimazole twice weekly long term.    He as come in with questions about worsening eustachian tube dysfunction and fullness in the ears.  But also some more nasal congestion  He is using Zyrtec and neilmed sinus rinse more recently.  He has known TMJ and uses a .    Past Medical History -   Patient Active Problem List   Diagnosis     Major depressive disorder, recurrent episode (H)     Intermittent asthma     Hyperlipidemia LDL goal <100     Chronic nonallergic rhinitis     Diverticulosis     GERD (gastroesophageal reflux disease)     Anxiety     LLOYD (obstructive sleep apnea)- mild (AHI 11)     Intracranial arachnoid cyst     Facet arthritis of cervical region     Acquired hypothyroidism     Bipolar 2 disorder (H)     Chronic midline low back pain without sciatica     Irritable bowel syndrome with diarrhea     B12 deficiency     Essential hypertension with goal blood pressure less than 140/90     Chronic pain syndrome     Ankylosing spondylitis of sacral region (H)      Morbid obesity due to hypertriglyceridemia (H)     Fatty infiltration of liver     DDD (degenerative disc disease), lumbar     Type 2 diabetes mellitus with complication, without long-term current use of insulin (H)     Peripheral polyneuropathy     History of pulmonary embolism     Ingrown toenail     Lipoma of skin and subcutaneous tissue     Hypertriglyceridemia     Gastroparesis     Orthostatic dizziness     POTS (postural orthostatic tachycardia syndrome)       Current Medications -   Current Outpatient Medications:      acetaminophen 500 MG CAPS, Take 500 mg by mouth every 4 hours as needed, Disp: 60 capsule, Rfl:      albuterol (PROAIR HFA/PROVENTIL HFA/VENTOLIN HFA) 108 (90 Base) MCG/ACT inhaler, Inhale 2 puffs into the lungs every 4 hours as needed for shortness of breath / dyspnea or wheezing, Disp: 8.5 g, Rfl: 11     albuterol (PROVENTIL) (2.5 MG/3ML) 0.083% neb solution, Take 1 vial (2.5 mg) by nebulization every 6 hours as needed for shortness of breath / dyspnea or wheezing, Disp: 200 mL, Rfl: 3     ALPRAZolam (XANAX XR) 0.5 MG 24 hr tablet, Take 0.5 mg by mouth 3 times daily as needed , Disp: , Rfl:      aspirin (ASA) 81 MG tablet, Take 81 mg by mouth daily , Disp: , Rfl:      celecoxib (CELEBREX) 200 MG capsule, TAKE 1 CAPSULE 2 TIMES     DAILY AS NEEDED MODERATE   PAIN, Disp: 180 capsule, Rfl: 1     cetirizine (ZYRTEC) 10 MG tablet, Take 1 tablet (10 mg) by mouth At Bedtime, Disp: 30 tablet, Rfl: 11     cholecalciferol (VITAMIN D3) 41438 units (1250 mcg) capsule, Take one capsule every two weeks., Disp: 24 capsule, Rfl: 1     cyanocobalamin (CYANOCOBALAMIN) 1000 MCG/ML injection, Inject 1 mL (1,000 mcg) into the muscle every 30 days, Disp: 10 mL, Rfl: 0     DULoxetine (CYMBALTA) 60 MG capsule, Take 60 mg by mouth 2 times daily , Disp: , Rfl:      EPINEPHrine (EPIPEN/ADRENACLICK/OR ANY BX GENERIC EQUIV) 0.3 MG/0.3ML injection 2-pack, Inject 0.3 mLs (0.3 mg) into the muscle once as needed for  anaphylaxis, Disp: 0.6 mL, Rfl: 3     etanercept (ENBREL SURECLICK) 50 MG/ML autoinjector, Inject 50 mg Subcutaneous once a week Hold for signs of infection, and seek medical attention., Disp: 4 mL, Rfl: 5     famotidine (PEPCID) 20 MG tablet, Prior to administration of Humira every 2 weeks, Disp: , Rfl:      fluticasone-salmeterol (ADVAIR) 500-50 MCG/DOSE inhaler, Inhale 1 puff into the lungs every 12 hours, Disp: 3 Inhaler, Rfl: 3     Ginger, Zingiber officinalis, (GINGER ROOT) 550 MG CAPS capsule, Take 550 mg by mouth Up to three times daily, Disp: , Rfl:      hydrOXYzine (ATARAX) 50 MG tablet, Take 50 mg by mouth 2 times daily For anxiety and insomnia, Disp: , Rfl:      ketotifen (ZADITOR) 0.025 % ophthalmic solution, 1 drop 2 times daily, Disp: , Rfl:      lamoTRIgine (LAMICTAL) 100 MG tablet, Take 200 mg by mouth daily, Disp: , Rfl:      levothyroxine (SYNTHROID/LEVOTHROID) 75 MCG tablet, TAKE 1 TABLET EVERY MORNING, Disp: 90 tablet, Rfl: 1     Liniments (SALONPAS EX), Externally apply 1 Application topically daily as needed, Disp: , Rfl:      melatonin 3 MG tablet, Take 6 mg by mouth nightly as needed for sleep, Disp: , Rfl:      metFORMIN (GLUCOPHAGE-XR) 500 MG 24 hr tablet, Take 2 tablets (1,000 mg) by mouth daily (with dinner), Disp: 180 tablet, Rfl: 1     metoclopramide (REGLAN) 5 MG tablet, Take 1 tablet (5 mg) by mouth 3 times daily as needed (nausea), Disp: 90 tablet, Rfl: 1     metoprolol succinate ER (TOPROL-XL) 200 MG 24 hr tablet, Take 1 tablet (200 mg) by mouth 2 times daily, Disp: 180 tablet, Rfl: 0     montelukast (SINGULAIR) 10 MG tablet, Take 1 tablet (10 mg) by mouth every evening, Disp: 90 tablet, Rfl: 1     omega-3 acid ethyl esters (LOVAZA) 1 g capsule, TAKE 2 CAPSULES BY MOUTH TWICE DAILY., Disp: 360 capsule, Rfl: 2     omeprazole 20 MG tablet, Take 20 mg by mouth daily , Disp: , Rfl:      order for DME, [facility] thigh high hose:  30-40 mmHg  Equipment being ordered: [employer] thigh high hose  30-40 mmHg, Disp: 1 Units, Rfl: 1     order for DME, Equipment being ordered: lumbosacral belt/brace, Disp: 1 Units, Rfl: 0     order for DME, Respironics REMSTAR 60 Series Auto CPAP 9-13 cm H2O, Wisp nasal mask w/a large cushion and a chinstrap, Disp: , Rfl:      oxyCODONE (ROXICODONE) 5 MG tablet, Take 1-2 tablets (5-10 mg) by mouth every 6 hours as needed for pain (maximum 4 tablet(s) per day), Disp: 35 tablet, Rfl: 0     pregabalin (LYRICA) 150 MG capsule, Take 1 capsule (150 mg) by mouth 2 times daily (Patient taking differently: Take 150 mg by mouth 3 times daily ), Disp: 60 capsule, Rfl: 5     QUEtiapine (SEROQUEL) 25 MG tablet, Take 25 mg by mouth 4 times daily as needed, Disp: , Rfl:      ramipril (ALTACE) 10 MG capsule, Take 1 capsule (10 mg) by mouth daily, Disp: 90 capsule, Rfl: 1     rizatriptan (MAXALT-MLT) 5 MG ODT, Take 1 tablet (5 mg) by mouth at onset of headache for migraine, Disp: 30 tablet, Rfl: 5     rosuvastatin (CRESTOR) 40 MG tablet, TAKE 1 TABLET DAILY, Disp: 90 tablet, Rfl: 1     syringe, disposable, (BD TUBERCULIN SYRINGE) 1 ML MISC, Equipment being ordered: 1 ml tuberculin syringes to be used for Vitamin B12 injections., Disp: 12 each, Rfl: 11     vitamin C (ASCORBIC ACID) 500 MG tablet, Take 500 mg by mouth daily, Disp: , Rfl:     Allergies -   Allergies   Allergen Reactions     Amoxicillin-Pot Clavulanate Difficulty breathing     Banana Shortness Of Breath     Pt reports organic Banana is okay.      Nitroglycerin Palpitations     Penicillins Anaphylaxis     Provigil [Modafinil] Shortness Of Breath     headache     Gadolinium Hives and Itching     Patient was premedicated for the contrast allergy. He did still have a reaction a few hours after injection. Hives and itching. Dr. Gomez told tech to inform pt he should only have contrast again in the future when premedicated and at a hospital. Not at an outpatient facility.      Ketoconazole      Topical cream caused swelling and  itching     Dye [Contrast Dye] Other (See Comments) and Hives     Moderate flushing, CT contrast     Golimumab      Hives, bradycardia, face swelling     Neurontin [Gabapentin] Hives     Moderate hives     Nortriptyline Hives     Varicella Virus Vaccine Live      Rash     Flagyl [Metronidazole Hcl] Palpitations and Hives     Latex Rash     Metronidazole Palpitations, Other (See Comments) and Rash     dizziness (versus ciprofloxacin taken at same time)     No Clinical Screening - See Comments Rash     Nitrile gloves       Social History -   Social History     Socioeconomic History     Marital status: Single     Spouse name: Not on file     Number of children: Not on file     Years of education: Not on file     Highest education level: Not on file   Occupational History     Occupation:      Employer: [employer]     Occupation: paraprofessional     Comment: [employer]     Employer: [employer]   Social Needs     Financial resource strain: Not on file     Food insecurity:     Worry: Not on file     Inability: Not on file     Transportation needs:     Medical: Not on file     Non-medical: Not on file   Tobacco Use     Smoking status: Never Smoker     Smokeless tobacco: Never Used   Substance and Sexual Activity     Alcohol use: Yes     Alcohol/week: 0.0 oz     Drinks per session: 1 or 2     Binge frequency: Weekly     Comment: occ 1/week     Drug use: No     Sexual activity: Never     Partners: Female   Lifestyle     Physical activity:     Days per week: Not on file     Minutes per session: Not on file     Stress: Not on file   Relationships     Social connections:     Talks on phone: Not on file     Gets together: Not on file     Attends [employer] service: Not on file     Active member of club or organization: Not on file     Attends meetings of clubs or organizations: Not on file     Relationship status: Not on file     Intimate partner violence:     Fear of current or ex partner: Not on file      "Emotionally abused: Not on file     Physically abused: Not on file     Forced sexual activity: Not on file   Other Topics Concern     Parent/sibling w/ CABG, MI or angioplasty before 65F 55M? No      Service Not Asked     Blood Transfusions Not Asked     Caffeine Concern Not Asked     Occupational Exposure Not Asked     Hobby Hazards Not Asked     Sleep Concern Yes     Stress Concern Yes     Weight Concern Not Asked     Special Diet Not Asked     Back Care Yes     Exercise Not Asked     Bike Helmet Not Asked     Seat Belt Yes     Self-Exams Not Asked   Social History Narrative     Not on file       Family History -   Family History   Problem Relation Age of Onset     Musculoskeletal Disorder Mother         back     Anxiety Disorder Mother      Colon Polyps Mother      Ulcerative Colitis Mother         and ischemic small intestine, surgery     Hypertension Mother      Breast Cancer Mother      Osteoporosis Mother      Diabetes Mother         Type 2, Diagnosed in 2014     Depression Mother         Takes Cymbalta to help with chronic pain + depx     Thyroid Disease Mother         Hypothyroidism     Obesity Mother         Under much better control latter half of 2015     Musculoskeletal Disorder Father         back     Substance Abuse Father      Hypertension Father      Hyperlipidemia Father      Depression Father         Off meds for many years. Seems \"ok\"     Heart Disease Maternal Grandmother      Heart Disease Maternal Grandfather      Psychotic Disorder Paternal Grandfather      Suicide Paternal Grandfather      Depression Paternal Grandfather         Pediatrician. Committed suicide by pistol in 1990.     Musculoskeletal Disorder Brother         back     Depression Brother         Expressed as anger and moodiness     Substance Abuse Sister      Depression Sister         Mental Health Therapist, yet no anti-depressants?     Anxiety Disorder Sister         Mental Health Therapist, yet no anti-anxiety meds? "     Other Cancer Other         Bladder Cancer - Fatal     Substance Abuse Brother      Colon Cancer No family hx of      Crohn's Disease No family hx of      Anesthesia Reaction No family hx of      Cancer No family hx of         No family history of skin cancer       Review of Systems - As per HPI and PMHx, otherwise 10+ system review of the head and neck, and general constitution is negative.    Physical Exam  /83   Pulse 80   Wt 138.3 kg (305 lb)   SpO2 98%   BMI 35.25 kg/m      General - The patient is well nourished and well developed, and appears to have good nutritional status.  Alert and oriented to person and place, answers questions and cooperates with examination appropriately.   Head and Face - Normocephalic and atraumatic, with no gross asymmetry noted of the contour of the facial features.  The facial nerve is intact, with strong symmetric movements.  Voice and Breathing - The patient was breathing comfortably without the use of accessory muscles. There was no wheezing, stridor, or stertor.  The patients voice was clear and strong, and had appropriate pitch and quality.  Ears - The tympanic membranes are normal in appearance, bony landmarks are intact.  No retraction, perforation, or masses.  No fluid or purulence was seen in the external canal or the middle ear. No evidence of infection of the middle ear or external canal, cerumen was normal in appearance.  Eyes - Extraocular movements intact, and the pupils were reactive to light.  Sclera were not icteric or injected, conjunctiva were pink and moist.  Mouth - Examination of the oral cavity showed pink, healthy oral mucosa. No lesions or ulcerations noted.  The tongue was mobile and midline, and the dentition were in good condition.    Throat - The walls of the oropharynx were smooth, pink, moist, symmetric, and had no lesions or ulcerations.  The tonsillar pillars and soft palate were symmetric.  The uvula was midline on elevation.    Neck  - Normal midline excursion of the laryngotracheal complex during swallowing.  Full range of motion on passive movement.  Palpation of the occipital, submental, submandibular, internal jugular chain, and supraclavicular nodes did not demonstrate any abnormal lymph nodes or masses.  The carotid pulse was palpable bilaterally.  Palpation of the thyroid was soft and smooth, with no nodules or goiter appreciated.  The trachea was mobile and midline.  Nose - External contour is symmetric, no gross deflection or scars.  Nasal mucosa is pink and moist with no abnormal mucus.  The septum was midline and non-obstructive, turbinates of normal size and position.  No polyps, masses, or purulence noted on examination.      A/P - Joel Pineda is a 44 year old male    (H60.391) Chronic bacterial otitis externa, right  (primary encounter diagnosis)  (H93.8X3) Ear fullness, bilateral    The ears look great, no infections, so hold off on the preventive ear drops for now.    The ear pain is likely a return temporomandibular disease.  I will refer him again to [facility] PT for a refresher on the TMJ therapy.    For the nose, it looks healthy today.  I would not recommend any more medications, continue Zyrtec.     For his very dry throat, it is likely the atarax he uses for sleep.

## 2020-05-24 ENCOUNTER — MYC REFILL (OUTPATIENT)
Dept: FAMILY MEDICINE | Facility: CLINIC | Age: 45
End: 2020-05-24

## 2020-05-24 DIAGNOSIS — M51.369 DDD (DEGENERATIVE DISC DISEASE), LUMBAR: ICD-10-CM

## 2020-05-24 DIAGNOSIS — E78.5 HYPERLIPIDEMIA LDL GOAL <100: ICD-10-CM

## 2020-05-24 DIAGNOSIS — G62.9 NEUROPATHY: ICD-10-CM

## 2020-05-27 RX ORDER — ROSUVASTATIN CALCIUM 40 MG/1
40 TABLET, COATED ORAL DAILY
Qty: 90 TABLET | Refills: 0 | Status: SHIPPED | OUTPATIENT
Start: 2020-05-27 | End: 2020-08-23

## 2020-05-27 RX ORDER — PREGABALIN 150 MG/1
150 CAPSULE ORAL 2 TIMES DAILY
Qty: 60 CAPSULE | Refills: 5 | Status: SHIPPED | OUTPATIENT
Start: 2020-05-27 | End: 2020-06-23

## 2020-05-27 NOTE — TELEPHONE ENCOUNTER
"Requested Prescriptions   Pending Prescriptions Disp Refills     rosuvastatin (CRESTOR) 40 MG tablet 90 tablet 1     Sig: Take 1 tablet (40 mg) by mouth daily       Statins Protocol Passed - 5/26/2020  6:32 AM        Passed - LDL on file in past 12 months     Recent Labs   Lab Test 09/13/19  0943   LDL Cannot estimate LDL when triglyceride exceeds 400 mg/dL  64             Passed - No abnormal creatine kinase in past 12 months     Recent Labs   Lab Test 01/03/19  0844                   Passed - Recent (12 mo) or future (30 days) visit within the authorizing provider's specialty     Patient has had an office visit with the authorizing provider or a provider within the authorizing providers department within the previous 12 mos or has a future within next 30 days. See \"Patient Info\" tab in inbasket, or \"Choose Columns\" in Meds & Orders section of the refill encounter.              Passed - Medication is active on med list        Passed - Patient is age 18 or older           Prescription approved per [facility] Refill Protocol.      Martell Chaves RN, BSN, PHN    "

## 2020-05-27 NOTE — TELEPHONE ENCOUNTER
Routing refill request to provider for review/approval because:  Drug not on the [facility] refill protocol     Sapna Barragan RN, [facility] Triage

## 2020-05-29 DIAGNOSIS — M51.369 DDD (DEGENERATIVE DISC DISEASE), LUMBAR: ICD-10-CM

## 2020-05-29 DIAGNOSIS — G62.9 NEUROPATHY: ICD-10-CM

## 2020-06-01 ENCOUNTER — MYC REFILL (OUTPATIENT)
Dept: FAMILY MEDICINE | Facility: CLINIC | Age: 45
End: 2020-06-01

## 2020-06-01 DIAGNOSIS — M51.369 DDD (DEGENERATIVE DISC DISEASE), LUMBAR: ICD-10-CM

## 2020-06-01 DIAGNOSIS — M45.8 ANKYLOSING SPONDYLITIS OF SACRAL REGION (H): ICD-10-CM

## 2020-06-01 RX ORDER — PREGABALIN 150 MG/1
CAPSULE ORAL
Qty: 90 CAPSULE | Refills: 4 | OUTPATIENT
Start: 2020-06-01

## 2020-06-01 NOTE — TELEPHONE ENCOUNTER
90 day supply with 5 refills sent on 5/27/20 to [facility] in [place]. Should have refills on file at that pharmacy or patient can transfer if he wishes.        Martell Chaves RN, BSN, PHN

## 2020-06-02 RX ORDER — OXYCODONE HYDROCHLORIDE 5 MG/1
5-10 TABLET ORAL EVERY 6 HOURS PRN
Qty: 35 TABLET | Refills: 0 | Status: SHIPPED | OUTPATIENT
Start: 2020-06-02 | End: 2020-06-23

## 2020-06-02 NOTE — TELEPHONE ENCOUNTER
Controlled Substance Refill Request for Oxycodone  Problem List Complete:  Yes  Chronic pain syndrome   Problem Detail     Noted:  4/4/2017    Priority:  Medium    Overview Addendum 5/22/2020  2:59 PM by Martell Chaves, RN    Patient is followed by Ksenia Lyles MD for ongoing prescription of pain medication.  All refills should only be approved by this provider, or covering partner.     Medication(s): oxy 5.   Maximum quantity per month: 40  Clinic visit frequency required: Q3  months      Controlled substance agreement: 6/12/19  Encounter-Level CSA - 04/04/2017:            Controlled Substance Agreement - Scan on 4/11/2017  1:30 PM : CONTROLLED SUBSTANCE AGREEMENT (below)                   Pain Clinic evaluation in the past: Yes     DIRE Total Score(s):  No flowsheet data found.     Last [place] website verification:  05/22/20    [email or web address]     checked in past 3 months?  Yes 5/22/20   Last Written Prescription Date:  5/22/20  Last Fill Quantity: 10 tablets; patient is requesting the usual 35 tablets he usually gets.  # refills: 0   Last office visit: 5/18/2020 with prescribing provider:  5/18/20   Future Office Visit:   Next 5 appointments (look out 90 days)    Jul 30, 2020  3:00 PM CDT  [facility] Oneil Baxter MD  [facility] [address] [facility] [id number]  [phone]      [place] reviewed:  not reviewed/not due - last done on 5/22/20    [place] profile:  [email or web address]            Martell Chaves RN, BSN, PHN

## 2020-06-04 ENCOUNTER — OFFICE VISIT (OUTPATIENT)
Dept: FAMILY MEDICINE | Facility: CLINIC | Age: 45
End: 2020-06-04
Payer: MEDICARE

## 2020-06-04 VITALS
HEART RATE: 88 BPM | HEIGHT: 78 IN | BODY MASS INDEX: 36.04 KG/M2 | OXYGEN SATURATION: 98 % | SYSTOLIC BLOOD PRESSURE: 121 MMHG | RESPIRATION RATE: 18 BRPM | DIASTOLIC BLOOD PRESSURE: 78 MMHG | TEMPERATURE: 98.5 F | WEIGHT: 311.5 LBS

## 2020-06-04 DIAGNOSIS — M25.561 ACUTE PAIN OF RIGHT KNEE: Primary | ICD-10-CM

## 2020-06-04 PROCEDURE — 99214 OFFICE O/P EST MOD 30 MIN: CPT | Performed by: FAMILY MEDICINE

## 2020-06-04 RX ORDER — NABUMETONE 500 MG/1
500 TABLET, FILM COATED ORAL 2 TIMES DAILY PRN
Qty: 60 TABLET | Refills: 0 | Status: SHIPPED | OUTPATIENT
Start: 2020-06-04 | End: 2020-06-09

## 2020-06-04 ASSESSMENT — MIFFLIN-ST. JEOR: SCORE: 2426.2

## 2020-06-04 ASSESSMENT — PAIN SCALES - GENERAL: PAINLEVEL: SEVERE PAIN (6)

## 2020-06-04 NOTE — PATIENT INSTRUCTIONS
Please call [facility]: [phone] to schedule your right knee MRI at [place].   Patient Education     Knee Pain with Uncertain Cause    There are several common causes for knee pain. These can include:    A sprain of the ligaments that support the joint    An injury to the cartilage lining of the joint    Arthritis from wear-and-tear or inflammation  There are other causes as well. There may also be swelling, reduced movement of the knee joint, and pain with walking. A definite diagnosis will still need to be made. If your symptoms don't improve, further follow-up and testing may be needed.  Home care    Stay off the injured leg as much as possible until pain improves.    Apply an ice pack over the injured area for 15 to 20 minutes every 3 to 6 hours. You should do this for the first 24 to 48 hours. You can make an ice pack by filling a plastic bag that seals at the top with ice cubes and then wrapping it with a thin towel. Continue to use ice packs for relief of pain and swelling as needed. As the ice melts, be careful not to get your wrap, splint, or cast wet. After 48 hours, apply heat (warm shower or warm bath) for 15 to 20 minutes several times a day, or alternate ice and heat. If you have to wear a hook-and-loop knee brace, you can open it to apply the ice pack, or heat, directly to the knee. Never put ice directly on the skin. Always wrap the ice in a towel or other type of cloth.    You may use over-the-counter pain medicine to control pain, unless another pain medicine was prescribed. If you have chronic liver or kidney disease or ever had a stomach ulcer or gastrointestinal bleeding, talk with your healthcare provider before using these medicines.    If crutches or a walker have been recommended, don't put weight on the injured leg until you can do so without pain. Check with your healthcare provider before returning to sports or full work duties.    If you have a hook-and-loop knee  brace, you can remove it to bathe and sleep, unless told otherwise.  Follow-up care  Follow up with your healthcare provider as advised. This is usually within 1 to 2 weeks.  If X-rays were taken, you will be told of any new findings that may affect your care  Call 911  Call 911 if you have:    Shortness of breath    Chest pain  When to seek medical advice  Call your healthcare provider right away if any of these occur:    Toes or foot becomes swollen, cold, blue, numb, or tingly    Pain or swelling spreads over the knee or calf    Warmth or redness appears over the knee or calf    Other joints become painful    Rash appears    Fever of 100.4 F (38 C) or higher, or as directed by your healthcare provider    Chills  Date Last Reviewed: 5/1/2018 2000-2019 [employer]. [address] [place] [id number]. All rights reserved. This information is not intended as a substitute for professional medical care. Always follow your healthcare professional's instructions.           Patient Education     Knee Pain with Possible Torn Meniscus    The meniscus is a tough cartilage pad that cushions the inside of the knee joint. It helps absorb the shock from movement. It also spreads the weight of your body evenly across the knee joint. This prevents excess wear and tear to the bones of that joint.  The most common causes of meniscal tears are injuries, especially related to sports and degenerative disease that happens with aging.  A meniscus tear commonly happens during a twisting injury when the knee is bent. This causes pain, swelling, reduced movement of the knee, and trouble walking. There may be popping, clicking, joint locking or inability to completely straighten the knee. Ligaments of the knee may also be injured.  A torn meniscus is diagnosed by physical exam and X-rays. In the case of a severe injury, the knee may be too painful to examine fully. A more accurate exam can be done after the initial  swelling goes down. An MRI may be done to make a final diagnosis.  If your healthcare provider suspects a meniscal injury, you will treat your knee with ice and rest and preventing movement of the knee. A splint or knee brace that keeps your leg straight may be put on to protect the joint. Depending on the severity of the injury, surgery may be needed. A cartilage injury may take 4 to 12 weeks to heal depending on how bad it is.  Home care    Stay off the injured leg as much as possible until you can walk on it without pain. If you have a lot of pain when walking, crutches or a walker may be prescribed. (These can be rented or bought at many pharmacies and surgical or orthopedic supply stores). Follow your healthcare provider's advice about when to begin putting weight on that leg.    Keep your leg elevated to reduce pain and swelling. When sleeping, place a pillow under the injured leg. When sitting, support the injured leg so it is above heart level. This is very important during the first 48 hours.    Apply an ice pack over the injured area for 15 to 20 minutes every 3 to 6 hours. You should do this for the first 24 to 48 hours. You can make an ice pack by filling a plastic bag that seals at the top with ice cubes and then wrapping it with a thin towel. Continue to use ice packs for relief of pain and swelling as needed. As the ice melts, be careful not to get your wrap, splint, or cast wet. After 48 hours, apply heat (warm shower or warm bath) for 15 to 20 minutes several times a day, or alternate ice and heat. You can place the ice pack directly over the splint. If you have to wear a hook-and-loop knee brace, you can open it to apply the ice pack, or heat, directly to the knee. Never put ice directly on the skin. Always wrap the ice in a towel or other type of cloth.    You may use over-the-counter pain medicine to control pain, unless another pain medicine was prescribed. If you have chronic liver or kidney  disease or ever had a stomach ulcer or gastrointestinal bleeding, talk with your healthcare provider before using these medicines.    If you were given a splint, keep it dry at all times. Bathe with your splint out of the water. Protect it with a large plastic bag that is rubber-banded or taped at the top end. If a fiberglass splint gets wet, you can dry it with a hair dryer set on cool. If you have a hook-and-loop knee brace, you can remove this to bathe, unless told otherwise.    Check with your healthcare provider before returning to sports or full work duties.  Follow-up care  Follow up with your healthcare provider, or as advised. This is usually within 1 to 2 weeks. Further testing may be required to check the extent of your injury.  If X-rays were taken, you will be told of any new findings that may affect your care.  Call 911  Call 911 if you have:     Shortness of breath    Chest pain  When to seek medical advice  Call your healthcare provider right away if any of these occur:    Toes or foot gets swollen, cold, blue, numb, or tingly    Pain or swelling spreads over the knee or calf    Warmth or redness appears over the knee or calf    Fever of 100.4 F (38 C) or higher, or as directed by your healthcare provider    [employer]ills  Date Last Reviewed: 5/1/2018 2000-2019 [employer]. [address] [place] [address], [place] [address]. All rights reserved. This information is not intended as a substitute for professional medical care. Always follow your healthcare professional's instructions.

## 2020-06-04 NOTE — PROGRESS NOTES
"Subjective     Joel Pineda is a 44 year old male who presents to clinic today for the following health issues:    HPI   Joint Pain    Onset: yesterday    Description:   Location: right knee  Character: Sharp    Intensity: mild to moderate    Progression of Symptoms: intermittent    Accompanying Signs & Symptoms: some popping in the knee  Other symptoms: radiation of pain to upper thigh a little    History:   Previous similar pain: no       Precipitating factors:   Trauma or overuse: no     Alleviating factors:  Improved by: nothing    Therapies Tried and outcome: hot bath but that did not help      Past medical, family, and social histories, medications, and allergies are reviewed and updated in Epic. This is significant for Type 2 diabetes, gastroparesis, chronic pain (lumbar) on multiple medications.    Review of Systems   Constitutional, HEENT, cardiovascular, pulmonary, gi and gu systems are negative, except as otherwise noted.    OBJECTIVE:   /78 (BP Location: Right arm, Patient Position: Sitting, Cuff Size: Adult Large)   Pulse 88   Temp 98.5  F (36.9  C) (Oral)   Resp 18   Ht 1.981 m (6' 6\")   Wt 141.3 kg (311 lb 8 oz)   SpO2 98%   BMI 36.00 kg/m    Body mass index is 36 kg/m .  Physical Exam   GENERAL: healthy, alert and no distress  EYES: Eyes grossly normal to inspection, PERRL, EOMI, sclerae white and conjunctivae normal  MS: no gross musculoskeletal defects noted, no edema.  Specifically with the right knee exam, there is no swelling or effusion, no erythema, no joint line tenderness, no pain with depression or other manipulation of the patella, and he is not tender on palpating just distal to the patella, which is where the patient generally perceives his anterior knee pain  SKIN: no suspicious lesions or rashes to visible skin  NEURO: Normal strength and tone, sensory exam grossly normal, mentation intact, oriented times 3 and cranial nerves 2-12 intact  PSYCH: mentation appears normal, " affect normal/bright     Diagnostic Test Results:      ASSESSMENT / PLAN:    (M25.561) Acute pain of right knee  (primary encounter diagnosis)  Comment: No direct trauma.  This could be a minor musculoskeletal strain that is self-limited, but there is potential for cartilage injury, since the onset involved him weightbearing at the time (injury from pivoting?).  Patient recalls that nabumetone had been more effective than his usual celecoxib on a recent trial  Plan: nabumetone (RELAFEN) 500 MG tablet, MR Knee         Right w/o Contrast        Hold celecoxib.  Acetaminophen okay. Return in about 20 days (around 6/24/2020) for knee MRI if symptoms still present.  (On or after that date, which would be 3 weeks after the onset of symptoms)    Bubba Anderson MD

## 2020-06-08 ENCOUNTER — NURSE TRIAGE (OUTPATIENT)
Dept: NURSING | Facility: CLINIC | Age: 45
End: 2020-06-08

## 2020-06-08 NOTE — PROGRESS NOTES
MTM ENCOUNTER  SUBJECTIVE/OBJECTIVE:                           Joel Pineda is a 44 year old male called for a follow-up visit. He was referred to me from Ksenia Lyles.  Today's visit is a follow-up MTM visit from 4/7/2020     Patient consented to a telehealth visit: yes  Telemedicine Visit Details  Type of service:  Telephone visit  Start Time: 2:02 PM  End Time: 2:29PM  Originating Location (pt. Location): Home  Distant Location (provider location):  [facility] MTM  Mode of Communication:  Telephone    Chief Complaint: Knee pain (relafen, holding celebrex); patient is looking for neurologist to help manage his POTS    Allergies/ADRs: Reviewed in Epic  Tobacco: No tobacco use  Alcohol: none  Caffeine: not assessed today  Activity: patient will start exercising, pool and walking track  PMH: Reviewed in Epic    Ankylosing Spondylitis: Current medications include Enbrel 50mg weekly. Patient was switched from Humira to Enbrel because of headaches and burning mouth. Patient follows with Dr. Baxter, Rheumatology. Patient is going to [facility] physical therapy in [place] and this has been helpful.      Mood/Anxiety/Insomnia: current therapy includes lamictal 200mg daily, duloxetine 60mg twice daily, Xanax 1mg at night and maybe an additional dose of 0.5mg during the day. up to three times daily as needed, patient has been taking 1mg before bedtime because he has been having more nightmares in the middle of the night,  quetiapine 50mg as needed if he is really agitated (uses once every 10 days), melatonin 6mg at bedtime (psychiatry increased his dose from 3mg to 6mg because he was acting out his dream.) and this has helped. Patient's psychiatrist is Dr. Rodriguez at [facility] in [place].  Sees therapist weekly at [facility].       Pain: current therapy includes APAP as needed-patient is taking  500 mg 2-3 times daily, nabumteone 1000mg twice daily (patient was started on  this for knee pain and this is helping more than the celebrex for knee pain), lyrica 150mg three times daily. Patient is planning on decreasing his dose of lyrica with the increase of nabumetone.  Patient has had to take 2 oxycodone after physical therapy. Patient has been doing physical therapy once a week.    Diarrhea: patient has been having loose stools almost daily. Since starting nabumetone this has been better.     ASSESSMENT:                             Current medications were reviewed today.     Medication Adherence: no issues identified    Ankylosing Spondylitis: Stable     Mood/Anxiety/Insomnia: Stable.  Patient in close follow-up with psychiatry.    Pain: Stable.    Diarrhea: Stable.    PLAN:                          No medication recommendations made today.  Sent message to neurology care coordinator to help patient schedule appt with neurologist to help manage POTS.    I spent 30 minutes with this patient today.  All changes were made via collaborative practice agreement with Ksenia Lyles. A copy of the visit note was provided to the patient's primary care provider.    Will follow up in 8 weeks.    The patient was sent via [employer] a summary of these recommendations as an after visit summary.     Radha Mcdonald, Pharm.D, BCACP  Medication Therapy Management Pharmacist

## 2020-06-09 ENCOUNTER — ALLIED HEALTH/NURSE VISIT (OUTPATIENT)
Dept: PHARMACY | Facility: CLINIC | Age: 45
End: 2020-06-09
Payer: COMMERCIAL

## 2020-06-09 DIAGNOSIS — G47.00 INSOMNIA, UNSPECIFIED TYPE: ICD-10-CM

## 2020-06-09 DIAGNOSIS — M45.8 ANKYLOSING SPONDYLITIS OF SACRAL REGION (H): ICD-10-CM

## 2020-06-09 DIAGNOSIS — F41.8 DEPRESSION WITH ANXIETY: ICD-10-CM

## 2020-06-09 DIAGNOSIS — G89.4 CHRONIC PAIN SYNDROME: Primary | ICD-10-CM

## 2020-06-09 DIAGNOSIS — R19.7 DIARRHEA, UNSPECIFIED TYPE: ICD-10-CM

## 2020-06-09 PROCEDURE — 99606 MTMS BY PHARM EST 15 MIN: CPT | Performed by: PHARMACIST

## 2020-06-09 PROCEDURE — 99607 MTMS BY PHARM ADDL 15 MIN: CPT | Performed by: PHARMACIST

## 2020-06-09 RX ORDER — ALPRAZOLAM 0.5 MG
TABLET ORAL
COMMUNITY
Start: 2020-06-08 | End: 2020-12-02

## 2020-06-09 RX ORDER — NABUMETONE 500 MG/1
1000 TABLET, FILM COATED ORAL 2 TIMES DAILY
COMMUNITY
End: 2020-06-12

## 2020-06-09 NOTE — TELEPHONE ENCOUNTER
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 Is now itching all over - took some benadryl and used inhaler - feeling a lot better now. BP ok, P60's - no rash. Wondering if he should take hydroxyzine - states he has some at home from previous. Advised patient writer cannot recommend hydroxyzine as it was prescribed for alternate reason.    Per protocol - advised homecare - continue to monitor - call back with further questions and/or concerns.    Renay Rashid RN on 6/8/2020 at 9:14 PM    Additional Information    Negative: [1] Life-threatening reaction (anaphylaxis) in the past to similar substance (e.g., food, insect bite/sting, chemical, etc.) AND [2] < 2 hours since exposure    Negative: Difficulty breathing or wheezing    Negative: [1] Difficulty swallowing or slurred speech AND [2] sudden onset    Negative: Sounds like a life-threatening emergency to the triager    Negative: Could be severe allergic reaction    Negative: Insect bites suspected    Negative: Looks like hives (pink bumps with pale centers)    Negative: Yellowish color of the skin AND sclera (white of the eye)    Negative: Itching in just one area or spot    Negative: Widespread rash also present    Negative: Patient sounds very sick or weak to the triager    Negative: [1] MODERATE-SEVERE widespread itching (i.e., interferes with sleep, normal activities or school) AND [2] not improved after 24 hours of itching Care Advice    Negative: [1] MODERATE-SEVERE widespread itching (i.e., interferes with sleep, normal activities or school) AND [2] pregnant    Negative: Taking prescription medication that could cause itching (e.g., codeine/morphine/other opiates, aspirin)    Negative: [1] Hand or foot  itching AND [2] pregnant    Negative: [1] Widespread itching AND [2] cause unknown AND [3] present > 48 hours (Exception: caller knows the cause and can eliminate it)    Negative: [1]  MILD widespread itching AND [2] pregnant    Negative: Itching is a chronic symptom (recurrent or ongoing AND present > 4 weeks)    Negative: Itching from pollen exposure (e.g., after rolling in grass)    Negative: Itching from low humidity (dry air, especially in wintertime)    Negative: Itching from bubble baths or other soaps    Negative: Itching from chlorine in swimming pool    [1] Itching of unknown cause AND [2] present < 48 hours    Protocols used: ITCHING - WIDESPREAD-A-AH

## 2020-06-09 NOTE — PATIENT INSTRUCTIONS
Recommendations from today's MTM visit:                                                      No medication changes recommended today.   You should hear something from our neurology dept, if not please give us a call.    It was great to speak with you today.  I value your experience and would be very thankful for your time with providing feedback on our clinic survey. You may receive a survey via email or text message in the next few days.     Next MTM visit: 8 weeks    To schedule another MTM appointment, please call the clinic directly or you may call the MTM scheduling line at [phone] or toll-free at [phone].     My Clinical Pharmacist's contact information:                                                      It was a pleasure talking with you today!  Please feel free to contact me with any questions or concerns you have.      Radha Mcdonald, Pharm.D, BCACP  Medication Therapy Management Pharmacist

## 2020-06-11 ENCOUNTER — VIRTUAL VISIT (OUTPATIENT)
Dept: PULMONOLOGY | Facility: CLINIC | Age: 45
End: 2020-06-11
Payer: MEDICARE

## 2020-06-11 DIAGNOSIS — J31.0 CHRONIC NONALLERGIC RHINITIS: Primary | ICD-10-CM

## 2020-06-11 DIAGNOSIS — J45.30 MILD PERSISTENT ASTHMA, UNSPECIFIED WHETHER COMPLICATED: ICD-10-CM

## 2020-06-11 PROCEDURE — 99211 OFF/OP EST MAY X REQ PHY/QHP: CPT | Mod: GT | Performed by: INTERNAL MEDICINE

## 2020-06-11 NOTE — PROGRESS NOTES
"Joel Pineda is a 44 year old male who is being evaluated via a billable video visit.      The patient has been notified of following:     \"This video visit will be conducted via a call between you and your physician/provider. We have found that certain health care needs can be provided without the need for an in-person physical exam.  This service lets us provide the care you need with a video conversation.  If a prescription is necessary we can send it directly to your pharmacy.  If lab work is needed we can place an order for that and you can then stop by our lab to have the test done at a later time.    Video visits are billed at different rates depending on your insurance coverage.  Please reach out to your insurance provider with any questions.    If during the course of the call the physician/provider feels a video visit is not appropriate, you will not be charged for this service.\"    Patient has given verbal consent for Video visit? Yes    Will anyone else be joining your video visit? No        Slime Szymanski LPN    Rheumatology / Pulmonology  [facility]          Video-Visit Details    Type of service:  Video Visit    Video Visit Time = 8 minutes    Originating Location (pt. Location): Home    Distant Location (provider location):  [facility]     Platform used for Video Visit: [employer]     HPI:  Mr. Pineda is a 44-year-old male with a history of ankylosing spondylitis on Humira, acid reflux, allergies, and asthma who presents to pulmonary clinic today for further follow-up of asthma.  We last visited about 6 months ago at which point he was doing well. His asthma had been decently well controlled on low dose Advair, Singulair, and albuterol as needed and pulmonary function has historically been normal.  Previous attempts to de-escalate asthma therapy yielded nearly immediate return to symptoms.  He is a never smoker.Since our last visit:    -on Advair, Singulair and " albuterol as needed  -trying to remember to use albuterol before using N95 - this seems to help breathing when wearing the mask - otherwise uses albuterol only sporadically  -Diagnosed with POTS and autonomic neuropathy  -No flares requiring treatment with prednisone  -About 1 month ago, experienced transient symptom worsening due to pollen - this has since resolved -- he continues to use Zyrtec nightly and Flonase as needed  -No episodes of pneumonia  -no hemoptysis, fevers, new or worsening shortness of breath    GENERAL: Healthy, alert and no distress  EYES: Eyes grossly normal to inspection.  No discharge or erythema, or obvious scleral/conjunctival abnormalities.  RESP: No audible wheeze, cough, or visible cyanosis.  No visible retractions or increased work of breathing.    SKIN: Visible skin clear. No significant rash, abnormal pigmentation or lesions.  NEURO: Cranial nerves grossly intact.  Mentation and speech appropriate for age.  PSYCH: Mentation appears normal, affect normal/bright, judgement and insight intact, normal speech and appearance well-groomed.    A/P:    Joel Pinead is a 44 year old male with a history of ankylosing spondylitis on Humira, acid reflux, allergies, and asthma who presents to pulmonary clinic today for further follow-up of asthma. Since we last visited, he remains at his respiratory symptom baseline and has been without acute exacerbation of asthma.  At this point I would like for him to continue on Advair, Singulair, Zyrtec, and albuterol as needed.  As previous attempts to de-escalate asthma therapy resulted in near immediate return of symptoms, it is likely that he will have a lifelong requirement for maintenance therapy.    Questions and concerns were answered to the patient's satisfaction.  he was provided with my contact information should new questions or concerns arise in the interim.    he should return to clinic in 12 months.    He is up to date on a seasonal influenza  vaccine, Prevnar (2015) and Pneumovax (2016).       Elaine Segovia MD

## 2020-06-12 ENCOUNTER — VIRTUAL VISIT (OUTPATIENT)
Dept: FAMILY MEDICINE | Facility: CLINIC | Age: 45
End: 2020-06-12
Payer: MEDICARE

## 2020-06-12 DIAGNOSIS — M45.8 ANKYLOSING SPONDYLITIS OF SACRAL REGION (H): ICD-10-CM

## 2020-06-12 DIAGNOSIS — M51.369 DDD (DEGENERATIVE DISC DISEASE), LUMBAR: ICD-10-CM

## 2020-06-12 DIAGNOSIS — G62.9 PERIPHERAL POLYNEUROPATHY: Primary | ICD-10-CM

## 2020-06-12 PROCEDURE — 99214 OFFICE O/P EST MOD 30 MIN: CPT | Mod: TEL | Performed by: FAMILY MEDICINE

## 2020-06-12 RX ORDER — NABUMETONE 500 MG/1
500-1000 TABLET, FILM COATED ORAL 2 TIMES DAILY WITH MEALS
Qty: 360 TABLET | Refills: 0 | Status: SHIPPED | OUTPATIENT
Start: 2020-06-12 | End: 2020-09-01

## 2020-06-12 NOTE — Clinical Note
"Please find the 5/26/20 scanned document titled \"PHYSICAL THERAPY TREATMENT PLAN ORTHOLOGY\" and re-fax it to Conniey Mariana Barfield (fax # on last page)"

## 2020-06-12 NOTE — PROGRESS NOTES
"Joel Pineda is a 44 year old male who is being evaluated via a billable telephone visit.      The patient has been notified of following:     \"This telephone visit will be conducted via a call between you and your physician/provider. We have found that certain health care needs can be provided without the need for a physical exam.  This service lets us provide the care you need with a short phone conversation.  If a prescription is necessary we can send it directly to your pharmacy.  If lab work is needed we can place an order for that and you can then stop by our lab to have the test done at a later time.    Telephone visits are billed at different rates depending on your insurance coverage. During this emergency period, for some insurers they may be billed the same as an in-person visit.  Please reach out to your insurance provider with any questions.    If during the course of the call the physician/provider feels a telephone visit is not appropriate, you will not be charged for this service.\"    Patient has given verbal consent for Telephone visit?  Yes    What phone number would you like to be contacted at? *[phone]    How would you like to obtain your AVS? MyChart    Subjective     Joel Pineda is a 44 year old male who presents via phone visit today for the following health issues:    HPI  Pt is having pain in both his feet. Would like a letter for physical therapy   Musculoskeletal problem/pain      Duration: 18 months,     Description  Location: feet    Intensity:  moderate, severe    Accompanying signs and symptoms: none    History  Previous similar problem: no   Previous evaluation: Foot physical therapy at [facility] in [place] on 5/12/2020    Precipitating or alleviating factors:  Trauma or overuse: no   Aggravating factors include: none?  Therapies tried and outcome: NSAID -celecoxib and nabumetone, Lyrica, and most recently the foot physical therapy, which she has found has been the most " helpful.  He would like to continue with that therapy      Past medical, family, and social histories, medications, and allergies are reviewed and updated in Epic.  Allergies: Amoxicillin-pot clavulanate; Banana; Nitroglycerin; Penicillins; Provigil [modafinil]; Gadolinium; Ketoconazole; Dye [contrast dye]; Golimumab; Neurontin [gabapentin]; Nortriptyline; Varicella virus vaccine live; Flagyl [metronidazole hcl]; Latex; Metronidazole; and No clinical screening - see comments    Review Of Systems:   Constitutional, HEENT, cardiovascular, pulmonary, gi and gu systems are negative, except as otherwise noted.    Objective:     Reported vitals:There were no vitals taken for this visit.   healthy, alert and no distress  RESP: No cough, no audible wheezing, able to talk in full sentences.  PSYCH: Alert and oriented times 3; coherent speech, normal   rate and volume, able to articulate logical thoughts, able   to abstract reason, no tangential thoughts, no hallucinations   or delusions, and affect is normal and pleasant  The remainder of the exam cannot be completed due to this being a telephone visit.      Diagnostic Test Results:  Lab Results   Component Value Date    A1C 6.0 01/24/2020    A1C 5.9 09/13/2019    A1C 6.1 03/19/2019    A1C 6.7 12/24/2018    A1C 5.8 05/01/2018          =====    ASSESSMENT/PLAN:  (G62.9) Peripheral polyneuropathy  (primary encounter diagnosis)  Comment: The physical therapy order was signed last month.  I agree with that treatment plan.    Plan: It appears that that office did not receive our fax, so I will make sure that it gets sent again    (M45.8) Ankylosing spondylitis of sacral region (H)  (M51.36) DDD (degenerative disc disease), lumbar  Comment: As he mentioned with his last visit with me, he is finding that nabumetone is more helpful for his chronic pain than celecoxib, and he would like to take it long-term in the habit prescribed through his mail order pharmacy  Plan: nabumetone  (RELAFEN) 500 MG tablet               Phone call duration:  17 minutes    Bubba Anderson MD

## 2020-06-12 NOTE — TELEPHONE ENCOUNTER
RECORDS RECEIVED FROM: Internal/Care Everywhere   DATE RECEIVED: 6-18   NOTES STATUS DETAILS   OFFICE NOTE from referring provider    N/A    OFFICE NOTE from other cardiologists   and neurologists Internal Neuro Dr. Malone 3-18-20   DISCHARGE SUMMARY from hospital    N/A    DISCHARGE REPORT from the ER   Internal 2-15-20 [facility]   OPERATIVE REPORT    N/A    MEDICATION LIST   Internal    LABS     BMP   Care Everywhere 2-15-20   CBC   Care Everywhere 2-15-20   CMP   Internal 1-24-20   Lipids   Internal 9-13-19   TSH   Internal 1-24-20   DIAGNOSTIC PROCEDURES     EKG  Strips *important In process    Monitor Reports  Strips *important N/A    Cardioversions   N/A    ICD/pacemaker implant       Tilt table studies   N/A 2-25-20 - autonomic test scanned in Epic   IMAGING (DISC & REPORT)      ECHO's   N/A    Stress Tests   N/A    Cath   N/A    CT/CTA   In process    MRI/MRA   N/A      Action    Action Taken 6-12: Requested from [facility]:    CT Chest    2-15-20, 9-3-19   6-12: Requested ekgs 2-17-20, 10-28-19, and 9-5-18 from [facility]  6-12: [facility]. Sent EKGs to scanning

## 2020-06-15 ENCOUNTER — NURSE TRIAGE (OUTPATIENT)
Dept: NURSING | Facility: CLINIC | Age: 45
End: 2020-06-15

## 2020-06-15 ENCOUNTER — VIRTUAL VISIT (OUTPATIENT)
Dept: FAMILY MEDICINE | Facility: OTHER | Age: 45
End: 2020-06-15
Payer: COMMERCIAL

## 2020-06-15 ENCOUNTER — TRANSFERRED RECORDS (OUTPATIENT)
Dept: HEALTH INFORMATION MANAGEMENT | Facility: CLINIC | Age: 45
End: 2020-06-15

## 2020-06-15 PROCEDURE — 99421 OL DIG E/M SVC 5-10 MIN: CPT | Performed by: PHYSICIAN ASSISTANT

## 2020-06-15 NOTE — PROGRESS NOTES
"Date: 06/15/2020 17:59:08  Clinician: Karri Latham  Clinician NPI: [id number]  Patient: Joel Pineda  Patient : 1975  Patient Address: [facility] [place] [address]  Patient Phone: [phone]  Visit Protocol: General skin conditions  Patient Summary:  Joel is a 44 year old ( : 1975 ) male who initiated a Visit for evaluation of an unspecified skin condition. When asked the question \"Please sign me up to receive news, health information and promotions from [employer].\", Joel responded \"No\".    Images of his skin condition were uploaded.  His symptoms started 1-3 days ago and affect the right side of his body. The skin condition is located on his hairline. The skin condition is red in color.   The affected area has scabs and sores. It feels burning, tender to touch, and painful. The symptoms do not interfere with his sleep. Joel also feels feverish.   Symptom details     Redness: The redness has not rapidly increased in size.    Pain: The pain is mild (between 1-3 on a 10 point pain scale).    Temperature: His current temperature is 97.9 degrees Fahrenheit.      The skin condition has changed since the symptoms started. Description of changes as reported by the patient (free text): Increased in circumference to the size of a thumbnail. It is located near the cervical spine, and an area within a 2\" diameter is sore.   Denied symptoms include hives, itchiness, warm to touch, drainage, crusts, blisters, pimples, numbness, and dry/flaky skin. Joel He does not have a rash in the shape of a bull's-eye.   Treatments or home remedies used to relieve the symptoms as reported by the patient (free text): Pain relief, both topical (caused stinging), and oral. I'm currently applying moist heat...TBD.   Precipitating events   Joel did not come in contact with any irritants prior to the onset of his symptoms and has not been in close contact with anyone that has similar symptoms. He " also did not spend time in a wooded area, swim, travel, or spend excess time in the sun just before his symptoms started. Joel is not sure if he was bitten or stung by an insect.   Pertinent medical history  Joel has not experienced this skin condition before.   Joel has had chickenpox and has had shingles in the past.   Joel has a history of seasonal allergies or hay fever and asthma. He has ongoing medical conditions. Ongoing medical conditions as reported by the patient (free text): See chart. Immunosuppressant therapy, diabetec, etc.    Joel does not need a return to work/school note.   Weight: 310 lbs   Joel does not smoke or use smokeless tobacco.   Weight: 310 lbs    MEDICATIONS: nabumetone oral, Maxalt-MLT oral, ramipril oral, Seroquel oral, Lyrica oral, oxycodone oral, famotidine oral, omeprazole oral, Lovaza oral, Singulair oral, metoprolol succinate oral, Glucophage XR oral, melatonin oral, hydroxyzine HCl oral, Advair Diskus inhalation, Cymbalta oral, cyanocobalamin (vit B-12) injection, Zyrtec oral, Aspirin Low Dose oral, alprazolam oral, Tylenol Extra Strength oral, Ventolin HFA inhalation, Enbrel SureClick subcutaneous, ALLERGIES: ketoconazole, Flagyl, Penicillins  Clinician Response:  Dear Joel,   Based on the information provided, you have herpes zoster (shingles). Shingles is a blistered rash caused by the same virus that causes chickenpox - Varicella Zoster. Everyone who comes down with shingles has had chickenpox at some time in their life. After recovering from chickenpox, the inactive virus remains in the nerve cells. Shingles develops if the virus becomes active. This reactivation can happen years or even decades later.    Because the virus spreads along a nerve, the rash is painful and appears as a stripe along one side of the body. The rash contains groups of blisters that break, crust over, and resolve within 3-6 weeks. Although rare, it is possible for the pain to last  weeks after the rash has completely healed.  Medication information  I am prescribing:     Valacyclovir (Valtrex) 1 gram oral tablet. Take 1 tablet by mouth every 8 hours for 7 days. There are no refills with this prescription.   Self care  Shingles is not spread from one person to another. However, because shingles and chickenpox are caused by the same virus, you could spread chickenpox to someone who has not had the illness or been vaccinated against it. Cover the rash with a loose bandage until all blisters scab over to prevent spreading the virus to those at risk for getting chickenpox.  Steps you can take to be as comfortable as possible:     Avoid scratching the rash    Apply a cool, wet washcloth to your rash for 15 minutes several times a day    Take a lukewarm bath to soothe the skin (adding colloidal oatmeal can help even more)    Apply a moisturizing lotion immediately after bathing and frequently reapply throughout the day    Choose clothing and bedding made of a breathable material like cotton    Do not use antibiotic creams or ointments unless recommended by a provider     When to seek care  Please make an appointment to be seen in a clinic or urgent care if any of the following occur:     Symptoms do not improve after 7 days of treatment    New symptoms develop, or symptoms become worse    You are still experiencing pain 1 month after your shingles rash has completely healed    Symptoms are so severe that you are unable to sleep or do regular activities    You have areas of broken skin from scratching    You notice symptoms of a skin infection (spreading redness, pain that is not improving, fever, warmth)     Seek care in an emergency room if you develop a fever, headache, and sensitivity to light.   Diagnosis: Herpes zoster (shingles)  Diagnosis ICD: B02.8  Additional Clinician Notes: If symptoms are not improving in the next 2-3 days, please go see your regular doctor.  Prescription: valacyclovir  (Valtrex) 1 gram oral tablet 21 tablet, 7 days supply. Take 1 tablet by mouth every 8 hours for 7 days. Refills: 0, Refill as needed: no, Allow substitutions: yes  Pharmacy: [id number] - [phone] - [address] [place] [address]

## 2020-06-16 NOTE — TELEPHONE ENCOUNTER
"Pt calling as he was dx with shingles on his neck today through [facility]. He has noticed a lesion in his throat and wonders if it's from the shingles, it feels funny in the area of the lesion and seems to be a numb feeling that is \"going down his larynx\". He is breathing and swallowing fine at this time. He did take one valtrex after the [email or web address] visit that they prescribed. He has many medication allergies and will take a benadryl now and have a neighbor drive him to the ED. He does have epi pen but doesn't want to take it yet as he is not sure if this is from the lesions in his throat or an allergy to valtrex. He know if he has any trouble breathing/swallowing to call 911 and use the epi pen.     Manda Hernandez RN  [facility]  Triage Nurse Advisors        Reason for Disposition    Patient sounds very sick or weak to the triager    Additional Information    Negative: Severe difficulty breathing (e.g., struggling for each breath, speaks in single words, stridor)    Negative: Sounds like a life-threatening emergency to the triager    Negative: [1] Diagnosed strep throat AND [2] taking antibiotic AND [3] symptoms continue    Negative: Throat culture results, call about    Negative: Productive cough is main symptom    Negative: Non-productive cough is main symptom    Negative: Hoarseness is main symptom    Negative: Runny nose is main symptom    Negative: [1] Drooling or spitting out saliva (because can't swallow) AND [2] normal breathing    Negative: Unable to open mouth completely    Negative: [1] Difficulty breathing AND [2] not severe    Negative: Fever > 104 F (40 C)    Negative: [1] Refuses to drink anything AND [2] for > 12 hours    Negative: [1] Drinking very little AND [2] dehydration suspected (e.g., no urine > 12 hours, very dry mouth, very lightheaded)    Protocols used: SORE THROAT-A-AH      "

## 2020-06-18 ENCOUNTER — VIRTUAL VISIT (OUTPATIENT)
Dept: CARDIOLOGY | Facility: CLINIC | Age: 45
End: 2020-06-18
Attending: INTERNAL MEDICINE
Payer: MEDICARE

## 2020-06-18 ENCOUNTER — VIRTUAL VISIT (OUTPATIENT)
Dept: FAMILY MEDICINE | Facility: OTHER | Age: 45
End: 2020-06-18
Payer: COMMERCIAL

## 2020-06-18 ENCOUNTER — PRE VISIT (OUTPATIENT)
Dept: CARDIOLOGY | Facility: CLINIC | Age: 45
End: 2020-06-18

## 2020-06-18 DIAGNOSIS — I10 ESSENTIAL HYPERTENSION: ICD-10-CM

## 2020-06-18 DIAGNOSIS — R00.0 SINUS TACHYCARDIA: ICD-10-CM

## 2020-06-18 DIAGNOSIS — E08.41 DIABETIC MONONEUROPATHY ASSOCIATED WITH DIABETES MELLITUS DUE TO UNDERLYING CONDITION (H): ICD-10-CM

## 2020-06-18 DIAGNOSIS — E08.00 DIABETES MELLITUS DUE TO UNDERLYING CONDITION WITH HYPEROSMOLARITY WITHOUT COMA, WITHOUT LONG-TERM CURRENT USE OF INSULIN (H): ICD-10-CM

## 2020-06-18 DIAGNOSIS — R42 DIZZINESS: Primary | ICD-10-CM

## 2020-06-18 PROCEDURE — 99204 OFFICE O/P NEW MOD 45 MIN: CPT | Mod: 95 | Performed by: INTERNAL MEDICINE

## 2020-06-18 PROCEDURE — 99421 OL DIG E/M SVC 5-10 MIN: CPT | Performed by: PHYSICIAN ASSISTANT

## 2020-06-18 RX ORDER — VALACYCLOVIR HYDROCHLORIDE 1 G/1
TABLET, FILM COATED ORAL
COMMUNITY
Start: 2020-06-15 | End: 2020-06-23

## 2020-06-18 ASSESSMENT — PAIN SCALES - GENERAL: PAINLEVEL: MILD PAIN (3)

## 2020-06-18 NOTE — LETTER
"6/18/2020      RE: Joel Pineda  [address] [place] [address]       Dear Colleague,    Thank you for the opportunity to participate in the care of your patient, Joel Pineda, at the [facility] HEART [facility]. Please see a copy of my visit note below.      Electrophysiology Clinic Video Virtual Visit    Joel Pineda is a 44 year old male who is being evaluated via a billable video visit.      HPI:   45 yo with chronic dizziness. He notes a history of dizziness upon standing quickly. Has chronic back and foot pain for which he was seeing neurology who referred him to [facility] for an autonomic test. This was done in 2/2020 - patient was noted to have dizziness upon tilting, with baseline heart rate of 80 bpm which increased to 110 bpm within a few minutes without changes in BP. He was diagnosed with POTS and referred for further evaluation.    The patient tells me that his memory hasn't been great and he didn't really remember what led up to the tilt table testing. He lives alone, at baseline is very sedentary due to ankylosing spondylitis and chronic foot pain. He has frequent dizziness when he stands up quickly; symptoms are greatly improved by wearing compression stockings although he doesn't wear them often, and doesn't like to go outside wearing compression socks with shorts.   He has never had syncope. He denies palpitations. He knows that sometimes his heart rate will be noted to be 120-130 bpm when he's standing as shown by his pulse oximetry but he doesn't feel rapid heart rates.  He has gastroparesis, possibly related to diabetes, and is only to eat small meals occasionally, mostly he drinks protein drinks. He says his last full meal was around February. He drinks about a gallon of \"homemade\" gatorade a day - he adds supplements and salt to water, no sugar. He denies alcohol use, no h/o cigarette smoking or use of illicit drugs. Exercise is " difficult due to chronic back pain; he does go to physical therapy. Can walk up and down the aisles at [employer] when he needs to go shopping, limited by back pain. Denies orthopnea/PND. He does have lower extremity edema if he stands too long which is helped by elevating legs and with compression stocking. He also reported that last week he was out walking and both hands were swollen, causing discomfort, but swelling went down with elevation of arms.    He has  BP cuff at home but doesn't use it regularly. When he does check, he says baseline BP is about 120/80, but if he checks his BP after walking up the stairs it might be 200/100.  Most recent vital signs from 6/3/2020 showed /78, HR 88, weight 311 lbs, BMI 36.    PAST MEDICAL HISTORY:  Hypertension  Hyperlipidemia - mostly high TG, did not tolerate Niacin  Bipolar  Gastroparesis  Diabetes with neuropathy  Ankylosing spondylitis  Asthma  LLOYD  Shingles    CURRENT MEDICATIONS:  Aspirin 81 every day  Metformin 500  bid  Metoprolol succinate 200 bid  Rosuvastatin 40 qd  Ramipril 10 qd  Inhalers  Xanax prn  Cymbalta  Embrel  Pepcid  Lamictal  Lyrica  Seroquel  Maxalt  Lovaza       ALLERGIES:     Allergies   Allergen Reactions     Amoxicillin-Pot Clavulanate Difficulty breathing     Banana Shortness Of Breath     Pt reports organic Banana is okay.      Nitroglycerin Palpitations     Penicillins Anaphylaxis     Provigil [Modafinil] Shortness Of Breath     headache     Gadolinium Hives and Itching     Patient was premedicated for the contrast allergy. He did still have a reaction a few hours after injection. Hives and itching. Dr. Gomez told tech to inform pt he should only have contrast again in the future when premedicated and at a hospital. Not at an outpatient facility.      Ketoconazole      Topical cream caused swelling and itching     Dye [Contrast Dye] Other (See Comments) and Hives     Moderate flushing, CT contrast     Golimumab      Hives,  "bradycardia, face swelling     Neurontin [Gabapentin] Hives     Moderate hives     Nortriptyline Hives     Varicella Virus Vaccine Live      Rash     Flagyl [Metronidazole Hcl] Palpitations and Hives     Latex Rash     Metronidazole Palpitations, Other (See Comments) and Rash     dizziness (versus ciprofloxacin taken at same time)     No Clinical Screening - See Comments Rash     Nitrile gloves       FAMILY HISTORY    Father has HTN and high TG; mother has HTN    Family History   Problem Relation Age of Onset     Musculoskeletal Disorder Mother         back     Anxiety Disorder Mother      Colon Polyps Mother      Ulcerative Colitis Mother         and ischemic small intestine, surgery     Hypertension Mother      Breast Cancer Mother      Osteoporosis Mother      Diabetes Mother         Type 2, Diagnosed in 2014     Depression Mother         Takes Cymbalta to help with chronic pain + depx     Thyroid Disease Mother         Hypothyroidism     Obesity Mother         Under much better control latter half of 2015     Musculoskeletal Disorder Father         back     Substance Abuse Father      Hypertension Father      Hyperlipidemia Father      Depression Father         Off meds for many years. Seems \"ok\"     Heart Disease Maternal Grandmother      Heart Disease Maternal Grandfather      Psychotic Disorder Paternal Grandfather      Suicide Paternal Grandfather      Depression Paternal Grandfather         Pediatrician. Committed suicide by pistol in 1990.     Musculoskeletal Disorder Brother         back     Depression Brother         Expressed as anger and moodiness     Substance Abuse Sister      Depression Sister         Mental Health Therapist, yet no anti-depressants?     Anxiety Disorder Sister         Mental Health Therapist, yet no anti-anxiety meds?     Other Cancer Other         Bladder Cancer - Fatal     Substance Abuse Brother      Colon Cancer No family hx of      Crohn's Disease No family hx of      " "Anesthesia Reaction No family hx of      Cancer No family hx of         No family history of skin cancer       SOCIAL HISTORY:  Social History     Tobacco Use     Smoking status: Never Smoker     Smokeless tobacco: Never Used   Substance Use Topics     Alcohol use: Yes     Alcohol/week: 0.0 standard drinks     Drinks per session: 1 or 2     Binge frequency: Weekly     Comment: occ 1/week     Drug use: No       ROS:  10 point ROS neg other than the symptoms noted above in the HPI.    Labs:  2020: K 4.4, cr 0.81, A1C 6.0, TSH 2.17  2019: cholesterol 150, HDL 33,     Testing/Procedures:  ECHOCARDIOGRAM: 12/15/2014: normal EF 60-65%, normal atrial, no valve disease, RVSP 19    EK/15/2020: sinus 68 bpm    AUTONOMIC TEST REPORT from [facility] Neurology Department 2020:  Axon reflex sweating was normal at all sites. HR/BP responses normal to Valsalva maneuvers.  Baseline 127/92, HR 82 bpm  Upon tilt, patient reported dizziness and persisted throughout tile. HR increased by 35 bpm within 2 minutes, BP stable.         Exam:  2020: /78, HR 88, 6'6\", 311 lbs, BMI 36  The rest of a comprehensive physical examination is deferred due to public health emergency video visit restrictions.  CONSITUTIONAL: no acute distress  HEENT: no icterus, no redness or discharge, neck supple  CV: no visible edema of visualized extremities. No JVD.   RESPIRATORY: respirations nonlabored, no cough  NEURO: AA&Ox3, speech fluent/appropriate, motor grossly nonfocal  PSYCH: cooperative, affect appropriate  DERM: no rashes on visualized face/neck/upper extremities      Assessment and Plan:   1. Dizziness. Appears postural. He does not have palpitations. By definition, an increase in HR over 30 bpm within the first 10 minutes of tilt is considered POTS, however it is not very specific for autonomic dysfunction. It does appear that he has a primary autonomic disorder. Treatment of his symptoms are based on aggressive " hydration, compression stockings, and exercise. HIs symptoms are improved greatly with compression socks, he just needs to wear them often.   Encouraged him to continue physical therapy. He should continue to drink at least a gallon or more of water daily, however I cautioned him against adding salt to his water due to his hypertension.  2. Sinus tachycardia, suspect deconditioning and his nutritional status contribute to the picture.  3. Diabetes with neuropathy and gastroparesis  4. Hypertension. Baseline BP is controlled, but he appears to be hypertensive with minimal exercise. Avoid salt.        Plans: as above; hydration, compression stockings, exercise.     In addition to video time documented above, I spent an additional 15 minutes on data review and documentation.    I have reviewed the note as documented above.  This accurately captures the substance of my virtual visit with the patient. The patient states understanding and is agreeable with the plan.     Nidia Carson MD  Cardiology        [facility]  [facility]      Please do not hesitate to contact me if you have any questions/concerns.     Sincerely,     Nidia Carson MD

## 2020-06-18 NOTE — PATIENT INSTRUCTIONS
"You were evaluated today in the Cardiovascular Telemedicine Clinic at the [facility].     Cardiology Provider during your visit: Dr. Nidia Carson    Diagnosis:  Dizziness, fast heart rates    Results: discussed with patient    Orders:   None    Medication Changes:   None    Recommendations:   1. Aggressive hydration - aim for at least a gallon of water a day, more in summer  2. Avoid adding salt since you have high blood pressure  3. Wear compression stockings whenever possible  4. Be cautious when standing - do it slowly   5. Continue physical therapy and daily exercise        Follow-up:   As needed  Please follow up with primary care provider for medication refills         Please feel free to call me with any questions or concerns.       Homa Guerrero RN     Questions and [phone].   First press #1 for the [employer] and then press #3 for \"Medical Questions\" to reach us Cardiology Nurses.      On Call Cardiologist for after hours or on weekends: [phone]   option #4 and ask to speak to the on-call Cardiologist.          If you need a medication refill please contact your pharmacy.  Please allow 3 business days for your refill to be completed.   "

## 2020-06-18 NOTE — PROGRESS NOTES
"Electrophysiology Clinic Video Virtual Visit    Joel Pineda is a 44 year old male who is being evaluated via a billable video visit.      The patient has been notified of following:     \"This video visit will be conducted via a call between you and your physician/provider. We have found that certain health care needs can be provided without the need for an in-person physical exam.  This service lets us provide the care you need with a video conversation.  If a prescription is necessary we can send it directly to your pharmacy.  If lab work is needed we can place an order for that and you can then stop by our lab to have the test done at a later time.    If during the course of the call the physician/provider feels a video visit is not appropriate, you will not be charged for this service.\"     Physician has received verbal consent for a Video Visit from the patient? Yes    Patient would like the video invitation sent by: cell phone    Video Start Time: 11:03 AM    Video Stop Time: 11:30 AM    Mode of Communication:  Video Conference via [employer]    Originating Location (pt. Location): Home    Distant Location (provider location): [facility]    Mode of Communication:  Video Conference via [employer]      HPI:   43 yo with chronic dizziness. He notes a history of dizziness upon standing quickly. Has chronic back and foot pain for which he was seeing neurology who referred him to [facility] for an autonomic test. This was done in 2/2020 - patient was noted to have dizziness upon tilting, with baseline heart rate of 80 bpm which increased to 110 bpm within a few minutes without changes in BP. He was diagnosed with POTS and referred for further evaluation.    The patient tells me that his memory hasn't been great and he didn't really remember what led up to the tilt table testing. He lives alone, at baseline is very sedentary due to ankylosing spondylitis and chronic foot pain. He has frequent dizziness when " "he stands up quickly; symptoms are greatly improved by wearing compression stockings although he doesn't wear them often, and doesn't like to go outside wearing compression socks with shorts.   He has never had syncope. He denies palpitations. He knows that sometimes his heart rate will be noted to be 120-130 bpm when he's standing as shown by his pulse oximetry but he doesn't feel rapid heart rates.  He has gastroparesis, possibly related to diabetes, and is only to eat small meals occasionally, mostly he drinks protein drinks. He says his last full meal was around February. He drinks about a gallon of \"homemade\" gatorade a day - he adds supplements and salt to water, no sugar. He denies alcohol use, no h/o cigarette smoking or use of illicit drugs. Exercise is difficult due to chronic back pain; he does go to physical therapy. Can walk up and down the aisles at [employer] when he needs to go shopping, limited by back pain. Denies orthopnea/PND. He does have lower extremity edema if he stands too long which is helped by elevating legs and with compression stocking. He also reported that last week he was out walking and both hands were swollen, causing discomfort, but swelling went down with elevation of arms.    He has  BP cuff at home but doesn't use it regularly. When he does check, he says baseline BP is about 120/80, but if he checks his BP after walking up the stairs it might be 200/100.  Most recent vital signs from 6/3/2020 showed /78, HR 88, weight 311 lbs, BMI 36.    PAST MEDICAL HISTORY:  Hypertension  Hyperlipidemia - mostly high TG, did not tolerate Niacin  Bipolar  Gastroparesis  Diabetes with neuropathy  Ankylosing spondylitis  Asthma  LLOYD  Shingles    CURRENT MEDICATIONS:  Aspirin 81 every day  Metformin 500  bid  Metoprolol succinate 200 bid  Rosuvastatin 40 qd  Ramipril 10 qd  Inhalers  Xanax prn  Cymbalta  Saadiael  Pepcid  Lamictal  Lyrica  Seroquel  Maxalt  Lovaza       ALLERGIES:     Allergies "   Allergen Reactions     Amoxicillin-Pot Clavulanate Difficulty breathing     Banana Shortness Of Breath     Pt reports organic Banana is okay.      Nitroglycerin Palpitations     Penicillins Anaphylaxis     Provigil [Modafinil] Shortness Of Breath     headache     Gadolinium Hives and Itching     Patient was premedicated for the contrast allergy. He did still have a reaction a few hours after injection. Hives and itching. Dr. Gomez told tech to inform pt he should only have contrast again in the future when premedicated and at a hospital. Not at an outpatient facility.      Ketoconazole      Topical cream caused swelling and itching     Dye [Contrast Dye] Other (See Comments) and Hives     Moderate flushing, CT contrast     Golimumab      Hives, bradycardia, face swelling     Neurontin [Gabapentin] Hives     Moderate hives     Nortriptyline Hives     Varicella Virus Vaccine Live      Rash     Flagyl [Metronidazole Hcl] Palpitations and Hives     Latex Rash     Metronidazole Palpitations, Other (See Comments) and Rash     dizziness (versus ciprofloxacin taken at same time)     No Clinical Screening - See Comments Rash     Nitrile gloves       FAMILY HISTORY    Father has HTN and high TG; mother has HTN    Family History   Problem Relation Age of Onset     Musculoskeletal Disorder Mother         back     Anxiety Disorder Mother      Colon Polyps Mother      Ulcerative Colitis Mother         and ischemic small intestine, surgery     Hypertension Mother      Breast Cancer Mother      Osteoporosis Mother      Diabetes Mother         Type 2, Diagnosed in 2014     Depression Mother         Takes Cymbalta to help with chronic pain + depx     Thyroid Disease Mother         Hypothyroidism     Obesity Mother         Under much better control latter half of 2015     Musculoskeletal Disorder Father         back     Substance Abuse Father      Hypertension Father      Hyperlipidemia Father      Depression Father          "Off meds for many years. Seems \"ok\"     Heart Disease Maternal Grandmother      Heart Disease Maternal Grandfather      Psychotic Disorder Paternal Grandfather      Suicide Paternal Grandfather      Depression Paternal Grandfather         Pediatrician. Committed suicide by pistol in .     Musculoskeletal Disorder Brother         back     Depression Brother         Expressed as anger and moodiness     Substance Abuse Sister      Depression Sister         Mental Health Therapist, yet no anti-depressants?     Anxiety Disorder Sister         Mental Health Therapist, yet no anti-anxiety meds?     Other Cancer Other         Bladder Cancer - Fatal     Substance Abuse Brother      Colon Cancer No family hx of      Crohn's Disease No family hx of      Anesthesia Reaction No family hx of      Cancer No family hx of         No family history of skin cancer       SOCIAL HISTORY:  Social History     Tobacco Use     Smoking status: Never Smoker     Smokeless tobacco: Never Used   Substance Use Topics     Alcohol use: Yes     Alcohol/week: 0.0 standard drinks     Drinks per session: 1 or 2     Binge frequency: Weekly     Comment: occ 1/week     Drug use: No       ROS:  10 point ROS neg other than the symptoms noted above in the HPI.    Labs:  2020: K 4.4, cr 0.81, A1C 6.0, TSH 2.17  2019: cholesterol 150, HDL 33,     Testing/Procedures:  ECHOCARDIOGRAM: 12/15/2014: normal EF 60-65%, normal atrial, no valve disease, RVSP 19    EK/15/2020: sinus 68 bpm    AUTONOMIC TEST REPORT from [facility] Neurology Department 2020:  Axon reflex sweating was normal at all sites. HR/BP responses normal to Valsalva maneuvers.  Baseline 127/92, HR 82 bpm  Upon tilt, patient reported dizziness and persisted throughout tile. HR increased by 35 bpm within 2 minutes, BP stable.         Exam:  2020: /78, HR 88, 6'6\", 311 lbs, BMI 36  The rest of a comprehensive physical examination is deferred due to public health " emergency video visit restrictions.  CONSITUTIONAL: no acute distress  HEENT: no icterus, no redness or discharge, neck supple  CV: no visible edema of visualized extremities. No JVD.   RESPIRATORY: respirations nonlabored, no cough  NEURO: AA&Ox3, speech fluent/appropriate, motor grossly nonfocal  PSYCH: cooperative, affect appropriate  DERM: no rashes on visualized face/neck/upper extremities      Assessment and Plan:   1. Dizziness. Appears postural. He does not have palpitations. By definition, an increase in HR over 30 bpm within the first 10 minutes of tilt is considered POTS, however it is not very specific for autonomic dysfunction. It does appear that he has a primary autonomic disorder. Treatment of his symptoms are based on aggressive hydration, compression stockings, and exercise. HIs symptoms are improved greatly with compression socks, he just needs to wear them often.   Encouraged him to continue physical therapy. He should continue to drink at least a gallon or more of water daily, however I cautioned him against adding salt to his water due to his hypertension.  2. Sinus tachycardia, suspect deconditioning and his nutritional status contribute to the picture.  3. Diabetes with neuropathy and gastroparesis  4. Hypertension. Baseline BP is controlled, but he appears to be hypertensive with minimal exercise. Avoid salt.        Plans: as above; hydration, compression stockings, exercise.     In addition to video time documented above, I spent an additional 15 minutes on data review and documentation.    I have reviewed the note as documented above.  This accurately captures the substance of my virtual visit with the patient. The patient states understanding and is agreeable with the plan.     Nidia Carson MD  Cardiology        CC  [facility]

## 2020-06-18 NOTE — PROGRESS NOTES
"Date: 2020 16:29:53  Clinician: Peggy Martinez  Clinician NPI: [id number]  Patient: Joel Pineda  Patient : 1975  Patient Address: [address] [place] [address] [place] [address]  Patient Phone: [phone]  Visit Protocol: General skin conditions  Patient Summary:  Joel is a 44 year old ( : 1975 ) male who initiated a Visit for evaluation of an unspecified skin condition. When asked the question \"Please sign me up to receive news, health information and promotions from [employer].\", Joel responded \"No\".    Images of his skin condition were uploaded.  His symptoms started 4-6 days ago and affect the right side of his body. The skin condition is located on his neck. The skin condition is red in color.   The affected area has blisters, scabs, and sores. It feels burning, tender to touch, and painful. The symptoms do not interfere with his sleep.   Symptom details     Redness: The redness has not rapidly increased in size.    Blisters: Joel has only a few blisters.    Pain: The pain is moderate (between 4-6 on a 10 point pain scale).     The skin condition has changed since the symptoms started. Description of changes as reported by the patient (free text): Note the two attached pics. The pic that appears to show multiple blisters is from last night. The glistening in both pics is likely due to topical lidocaine.     The other pic is from 4pm today. It shows a connected wound, no longer  blisters. My main concern is the new red expansion around the wound center. Is this a sign of infection?   Denied symptoms include hives, itchiness, warm to touch, drainage, crusts, pimples, numbness, and dry/flaky skin. Joel does not feel feverish. He does not have a rash in the shape of a bull's-eye.   Treatments or home remedies used to relieve the symptoms as reported by the patient (free text): Per [email or web address], shingles diagnosis and corresponding new Valtrex Rx on Monday, Lupe 15.     Also, " topical lidocaine, oral pain meds, and coldpacks have been helpful.   Precipitating events   Joel did not come in contact with any irritants prior to the onset of his symptoms and has not been in close contact with anyone that has similar symptoms. He also did not spend time in a wooded area, swim, travel, or spend excess time in the sun just before his symptoms started. Joel did not get bitten or stung by an insect.   Pertinent medical history  Joel has experienced this skin condition before. His current skin condition does not come and go. The last time he experienced this skin condition was more than a year ago.   Joel has had chickenpox and has had shingles in the past.   Joel has a history of seasonal allergies or hay fever and asthma. He has ongoing medical conditions. Ongoing medical conditions as reported by the patient (free text): Please refer to my chart. Immunocompromised, Diabetec, etc.    Joel does not need a return to work/school note.   Weight: 310 lbs   Joel does not smoke or use smokeless tobacco.   Weight: 310 lbs    MEDICATIONS: Valtrex oral, nabumetone oral, Maxalt-MLT oral, ramipril oral, Seroquel oral, Lyrica oral, oxycodone oral, famotidine oral, omeprazole oral, Lovaza oral, Singulair oral, metoprolol succinate oral, Glucophage XR oral, melatonin oral, hydroxyzine HCl oral, Advair Diskus inhalation, Cymbalta oral, cyanocobalamin (vit B-12) injection, Zyrtec oral, Aspirin Low Dose oral, alprazolam oral, Tylenol Extra Strength oral, Ventolin HFA inhalation, Enbrel SureClick subcutaneous, ALLERGIES: ketoconazole, Flagyl, Penicillins, Augmentin  Clinician Response:  Dear Joel,   Based on the information provided, you have herpes zoster (shingles). Shingles is a blistered rash caused by the same virus that causes chickenpox - Varicella Zoster. Everyone who comes down with shingles has had chickenpox at some time in their life. After recovering from chickenpox, the inactive  virus remains in the nerve cells. Shingles develops if the virus becomes active. This reactivation can happen years or even decades later.    Because the virus spreads along a nerve, the rash is painful and appears as a stripe along one side of the body. The rash contains groups of blisters that break, crust over, and resolve within 3-6 weeks. Although rare, it is possible for the pain to last weeks after the rash has completely healed.  Self care  Shingles is not spread from one person to another. However, because shingles and chickenpox are caused by the same virus, you could spread chickenpox to someone who has not had the illness or been vaccinated against it. Cover the rash with a loose bandage until all blisters scab over to prevent spreading the virus to those at risk for getting chickenpox.  Steps you can take to be as comfortable as possible:     Avoid scratching the rash    Apply a cool, wet washcloth to your rash for 15 minutes several times a day    Take a lukewarm bath to soothe the skin (adding colloidal oatmeal can help even more)    Apply a moisturizing lotion immediately after bathing and frequently reapply throughout the day    Choose clothing and bedding made of a breathable material like cotton    Do not use antibiotic creams or ointments unless recommended by a provider     When to seek care  Please make an appointment to be seen in a clinic or urgent care if any of the following occur:     Symptoms do not improve after 7 days of treatment    New symptoms develop, or symptoms become worse    You are still experiencing pain 1 month after your shingles rash has completely healed    Symptoms are so severe that you are unable to sleep or do regular activities    You have areas of broken skin from scratching    You notice symptoms of a skin infection (spreading redness, pain that is not improving, fever, warmth)     Seek care in an emergency room if you develop a fever, headache, and sensitivity to  light.  Additional treatment plan   It is difficult to tell based on the pictures if this is the start of a skin infection or just irritation from shingles. I prescribed an antibiotic doxycycline 100mg twice daily x 7 days that you can start if you experience worsening redness, warmth, drainage/discharge, or tenderness in the area.&nbsp;    Diagnosis: Local infection of the skin and subcutaneous tissue, unspecified  Diagnosis ICD: L08.9  Prescription: doxycycline monohydrate (Monodox) 100 mg oral capsule 14 capsule, 7 days supply. Take 1 capsule by mouth 2 times per day for 7 days. Refills: 0, Refill as needed: no, Allow substitutions: yes  Pharmacy: [id number] - [phone] - [address] [place] [address]

## 2020-06-20 ENCOUNTER — NURSE TRIAGE (OUTPATIENT)
Dept: NURSING | Facility: CLINIC | Age: 45
End: 2020-06-20

## 2020-06-20 NOTE — TELEPHONE ENCOUNTER
Patient reports he was diagnosed with shingles last Monday. Was prescribed Valtrex and prescribed antibiotics for a wound on the back of his head. Yesterday he noticed another wound is forming on a different area on his scalp. Is using topical Lidocaine for pain. Patient states without the lidocaine pain is about 4/10. Reports he is exhausted physically and sleeping 14 hours a day. States the 1st wound is about 1 inch above hairline. The new wound is smaller and about 3-4 inches from ear on head. Patient states the 2nd wound feels a lot like the first one, he is worried about infection. Denies fever. Protocol recommend to be seen within 24 hours. Patient states he has an appointment with PCP on Tuesday. He wants to monitor the wound and will try to get a good picture of it. He will monitor for now and if needed do an [email or web address] visit tomorrow. Did notify him we can also get him an in person urgent care visit or virtual urgent care visit. Patient verbalized understanding and had no further questions.     Luz Vilchis RN/[facility]      COVID 19 Nurse Triage Plan/Patient Instructions    Please be aware that novel coronavirus (COVID-19) may be circulating in the community. If you develop symptoms such as fever, cough, or SOB or if you have concerns about the presence of another infection including coronavirus (COVID-19), please contact your health care provider or visit [email or web address].     Disposition/Instructions    Patient to schedule a Virtual Visit with provider. Reference Visit Selection Guide.    Thank you for taking steps to prevent the spread of this virus.  o Limit your contact with others.  o Wear a simple mask to cover your cough.  o Wash your hands well and often.    Resources    [facility]: About COVID-19: [email or web address]    CDC: What to Do If You're Sick: www.cdc.gov/coronavirus/2019-ncov/about/steps-when-sick.html    CDC: Ending Home Isolation:  www.cdc.gov/coronavirus/2019-ncov/hcp/disposition-in-home-patients.html     CDC: Caring for Someone: www.cdc.gov/coronavirus/2019-ncov/if-you-are-sick/care-for-someone.html     [facility]: Interim Guidance for Hospital Discharge to Home: www.health.[facility].[place]/diseases/coronavirus/hcp/hospdischarge.pdf    [facility] clinical trials (COVID-19 research studies): clinicalaffairs.[facility]/umn-clinical-trials     Below are the COVID-19 hotlines at the [place] Department of Health ([facility]). Interpreters are available.   o For health questions: Call 463-879-6278 or 1-383.132.6302 (7 a.m. to 7 p.m.)  o For questions about schools and childcare: Call 059-841-6549 or 1-662.671.3476 (7 a.m. to 7 p.m.)     Additional Information    Negative: [1] Widespread rash AND [2] bright red, sunburn-like AND [3] too weak to stand    Negative: Sounds like a life-threatening emergency to the triager    Negative: [1] Widespread rash AND [2] bright red, sunburn-like    Negative: Severe pain in the wound    Negative: Black (necrotic) or blisters develop in wound    Negative: Patient sounds very sick or weak to the triager    Negative: [1] Looks infected (spreading redness, red streak, pus) AND [2] fever    Negative: [1] Red streak runs from the wound AND [2] longer than 1 inch (2.5 cm)    Negative: [1] Skin around the wound has become red AND [2] larger than 2 inches (5 cm)    Negative: [1] Red area or streak AND [2] no fever    Negative: [1] Pus or cloudy fluid draining from wound AND [2] no fever    Negative: [1] Wound > 48 hours old AND [2] it becomes more tender    Negative: [1] Face wound AND [2] looks infected (e.g., spreading redness)    Negative: [1] Finger wound AND [2] entire finger swollen    Other signs of wound infection    Negative: Stitches (or staples) and  not  infected    Negative: Skin glue used to close wound and not infected    Negative:  surgical wound infection suspected    Negative: Surgical wound infection  suspected (post-op)    Negative: Animal bite wound infection suspected    Protocols used: WOUND INFECTION-A-AH

## 2020-06-23 ENCOUNTER — TELEPHONE (OUTPATIENT)
Dept: FAMILY MEDICINE | Facility: CLINIC | Age: 45
End: 2020-06-23

## 2020-06-23 ENCOUNTER — OFFICE VISIT (OUTPATIENT)
Dept: FAMILY MEDICINE | Facility: CLINIC | Age: 45
End: 2020-06-23
Payer: MEDICARE

## 2020-06-23 ENCOUNTER — TRANSFERRED RECORDS (OUTPATIENT)
Dept: HEALTH INFORMATION MANAGEMENT | Facility: CLINIC | Age: 45
End: 2020-06-23

## 2020-06-23 VITALS
HEART RATE: 84 BPM | DIASTOLIC BLOOD PRESSURE: 92 MMHG | HEIGHT: 78 IN | TEMPERATURE: 98.8 F | SYSTOLIC BLOOD PRESSURE: 132 MMHG | BODY MASS INDEX: 35.98 KG/M2 | WEIGHT: 311 LBS | OXYGEN SATURATION: 98 %

## 2020-06-23 DIAGNOSIS — M45.8 ANKYLOSING SPONDYLITIS OF SACRAL REGION (H): ICD-10-CM

## 2020-06-23 DIAGNOSIS — G62.9 NEUROPATHY: ICD-10-CM

## 2020-06-23 DIAGNOSIS — M79.672 BILATERAL FOOT PAIN: ICD-10-CM

## 2020-06-23 DIAGNOSIS — R60.0 PERIPHERAL EDEMA: ICD-10-CM

## 2020-06-23 DIAGNOSIS — M79.671 BILATERAL FOOT PAIN: ICD-10-CM

## 2020-06-23 DIAGNOSIS — G89.4 CHRONIC PAIN SYNDROME: ICD-10-CM

## 2020-06-23 DIAGNOSIS — B02.9 HERPES ZOSTER WITHOUT COMPLICATION: Primary | ICD-10-CM

## 2020-06-23 DIAGNOSIS — M51.369 DDD (DEGENERATIVE DISC DISEASE), LUMBAR: ICD-10-CM

## 2020-06-23 PROCEDURE — 99214 OFFICE O/P EST MOD 30 MIN: CPT | Performed by: FAMILY MEDICINE

## 2020-06-23 RX ORDER — LIDOCAINE 50 MG/G
OINTMENT TOPICAL 4 TIMES DAILY PRN
Qty: 50 G | Refills: 2 | Status: SHIPPED | OUTPATIENT
Start: 2020-06-23 | End: 2021-02-26

## 2020-06-23 RX ORDER — OXYCODONE HYDROCHLORIDE 5 MG/1
5-10 TABLET ORAL EVERY 6 HOURS PRN
Qty: 35 TABLET | Refills: 0 | Status: SHIPPED | OUTPATIENT
Start: 2020-06-23 | End: 2020-07-07

## 2020-06-23 RX ORDER — PREGABALIN 150 MG/1
150 CAPSULE ORAL 3 TIMES DAILY
Qty: 270 CAPSULE | Refills: 1 | Status: SHIPPED | OUTPATIENT
Start: 2020-06-23 | End: 2020-12-21

## 2020-06-23 ASSESSMENT — MIFFLIN-ST. JEOR: SCORE: 2423.94

## 2020-06-23 NOTE — TELEPHONE ENCOUNTER
PA required.   Obtain PA or change medications for lidocaine (XYLOCAINE) 5 % external ointment  ?    To complete the PA :  1.  Go to [email or web address] and click 'Enter a Key'  2.  Enter the patient's last name and  and the key.       Key: [id number]       Last Name: adam      : [birth year]  3.  Complete the form and click 'Send to Plan'

## 2020-06-23 NOTE — TELEPHONE ENCOUNTER
PA Initiation    Medication: lidocaine (XYLOCAINE) 5 % external ointment   Insurance Company: [employer] - Phone [phone] Fax [phone]  Pharmacy Filling the Rx: [id number] - [place], [place] - [address]  Filling Pharmacy Phone: [phone]  Filling Pharmacy Fax: [phone]  Start Date: 6/23/2020

## 2020-06-23 NOTE — PROCEDURES
Lyrica rx has been faxed to [employer] patient assistance program, [phone]      Alma CHIU (R))

## 2020-06-23 NOTE — LETTER
[facility]  06/23/20    Patient: Joel Pineda  YOB: 1975  Medical Record Number: [id number]                                                                  Opioid / Opioid Plus Controlled Substance Agreement    I understand that my care provider has prescribed an opioid (narcotic) controlled substance to help manage my condition(s). I am taking this medicine to help me function or work. I know this is strong medicine, and that it can cause serious side effects. Opioid medicine can be sedating, addicting and may cause a dependency on the drug. They can affect my ability to drive or think, and cause depression. They need to be taken exactly as prescribed. Combining opioids with certain medicines or chemicals (such as cocaine, sedatives and tranquilizers, sleeping pills, meth) can be dangerous or even fatal. Also, if I stop opioids suddenly, I may have severe withdrawal symptoms. Last, I understand that opioids do not work for all types of pain nor for all patients. If not helpful, I may be asked to stop them.        The risks, benefits, and side effects of these medicine(s) were explained to me. I agree that:    1. I will take part in other treatments as advised by my care team. This may be psychiatry or counseling, physical therapy, behavioral therapy, group treatment or a referral to a pain clinic. I will reduce or stop my medicine when my care team tells me to do so.  2. I will take my medicines as prescribed. I will not change the dose or schedule unless my care team tells me to. There will be no refills if I  run out early.   I may be contactedwithout warning and asked to complete a urine drug test or pill count at any time.   3. I will keep all my appointments, and understand this is part of the monitoring of opioids. My care team may require an office visit for EVERY opioid/controlled substance refill. If I miss appointments or don t follow instructions, my care team may stop my  medicine.  4. I will not ask other providers to prescribe controlled substances, and I will not accept controlled substances from other people. If I need another prescribed controlled substance for a new reason, I will tell my care team within 1 business day.  5. I will use one pharmacy to fill all of my controlled substance prescriptions, and it is up to me to make sure that I do not run out of my medicines on weekends or holidays. If my care team is willing to refill my opioid prescription without a visit, I must request refills only during office hours, refills may take up to 3 days to process, and it may take up to 5 to 7 days for my medicine to be mailed and ready at my pharmacy. Prescriptions will not be mailed anywhere except my pharmacy.        [id number]  Rev 12/18         Registration to scan to EHR                             Page 1 of 2               Controlled Substance Agreement Opioid        [facility]  06/23/20  Patient: Joel Pineda  YOB: 1975  Medical Record Number: [id number]                                                                  6. I am responsible for my prescriptions. If the medicine/prescription is lost or stolen, it will not be replaced. I also agree not to share controlled substance medicines with anyone.  7. I agree to not use ANY illegal or recreational drugs. This includes marijuana, cocaine, bath salts or other drugs. I agree not to use alcohol unless my care team says I may.          I agree to give urine samples whenever asked. If I don t give a urine sample, the care team may stop my medicine.    8. If I enroll in the [place] Medical Marijuana program, I will tell my care team. I will also sign an agreement to share my medical records with my care team.   9. I will bring in my list of medicines (or my medicine bottles) each time I come to the clinic.   10. I will tell my care team right away if I become pregnant or have a new medical problem  treated outside of my regular clinic.  11. I understand that this medicine can affect my thinking and judgment. It may be unsafe for me to drive, use machinery and do dangerous tasks. I will not do any of these things until I know how the medicine affects me. If my dose changes, I will wait to see how it affects me. I will contact my care team if I have concerns about medicine side effects.    I understand that if I do not follow any of the conditions above, my prescriptions or treatment may be stopped.      I agree that my provider, clinic care team, and pharmacy may work with any city, state or federal law enforcement agency that investigates the misuse, sale, or other diversion of my controlled medicine. I will allow my provider to discuss my care with or share a copy of this agreement with any other treating provider, pharmacy or emergency room where I receive care. I agree to give up (waive) any right of privacy or confidentiality with respect to these consents.     I have read this agreement and have asked questions about anything I did not understand.      ________________________________________________________________________  Patient signature - Date/Time -  Joel Pineda                                      ________________________________________________________________________  Witness signature                                                            ________________________________________________________________________  Provider signature - Ksenia Lyles MD      [id number]  Rev 12/18         Registration to scan to EHR                         Page 2 of 2                   Controlled Substance Agreement Opioid           Page 1 of 2  Opioid Pain Medicines (also known as Narcotics)  What You Need to Know    What are opioids?   Opioids are pain medicines that must be prescribed by a doctor.  They are also known as narcotics.    Examples are:     morphine (MS Contin, Lindy)    oxycodone  (Oxycontin)    oxycodone and acetaminophen (Percocet)    hydrocodone and acetaminophen (Vicodin, Norco)     fentanyl patch (Duragesic)     hydromorphone (Dilaudid)     methadone     What do opioids do well?   Opioids are best for short-term pain after a surgery or injury. They also work well for cancer pain. Unlike other pain medicines, they do not cause liver or kidney failure or ulcers. They may help some people with long-lasting (chronic) pain.     What do opioids NOT do well?   Opioids never get rid of pain entirely, and they do not work well for most patients with chronic pain. Opioids do not reduce swelling, one of the causes of pain. They also don t work well for nerve pain.                           For informational purposes only.  Not to replace the advice of your care provider.  Copyright [address] [facility]. All right reserved. [employer] [id number] 02/18.      Page 2 of 2    Risks and side effects   Talk to your doctor before you start or decide to keep taking one of these medicines. Side effects include:    Lowering your breathing rate enough to cause death    Overdose, including death, especially if taking higher than prescribed doses    Long-term opioid use    Worse depression symptoms; less pleasure in things you usually enjoy    Feeling tired or sluggish    Slower thoughts or cloudy thinking    Being more sensitive to pain over time; pain is harder to control    Trouble sleeping or restless sleep    Changes in hormone levels (for example, less testosterone)    Changes in sex drive or ability to have sex    Constipation    Unsafe driving    Itching and sweating    Feeling dizzy    Nausea, vomiting and dry mouth    What else should I know about opioids?  When someone takes opioids for too long or too often, they become dependent. This means that if you stop or reduce the medicine too quickly, you will have withdrawal symptoms.    Dependence is not the same as addiction. Addiction is when  people keep using a substance that harms their body, their mind or their relations with others. If you have a history of drug or alcohol abuse, taking opioids can cause a relapse.    Over time, opioids don t work as well. Most people will need higher and higher doses. The higher the dose, the more serious the side effects. We don t know the long-term effects of opioids.      Prescribed opioids aren't the best way to manage chronic pain    Other ways to manage pain include:      Ibuprofen or acetaminophen.  You should always try this first.      Treat health problems that may be causing pain.      acupuncture or massage, deep breathing, meditation, visual imagery, aromatherapy.      Use heat or ice at the pain site      Physical therapy and exercise      Stop smoking      See a counselor or therapist                                                  People who have used opioids for a long time may have a lower quality of life, worse depression, higher levels of pain and more visits to doctors.    Never share your opioids with others. Be sure to store opioids in a secure place, locked if possible.Young children can easily swallow them and overdose.     You can overdose on opioids.  Signs of overdose include decrease or loss of consciousness, slowed breathing, trouble waking and blue lips.  If someone is worried about overdose, they should call 911.    If you are at risk for overdose, you may get naloxone (Narcan, a medicine that reverses the effects of opioids.  If you overdose, a friend or family member can give you Narcan while waiting for the ambulance.  They need to know the signs of overdose and how to give Narcan.    While you're taking opioids:    Don't use alcohol or street drugs. Taking them together can cause death.    Don't take any of these medicines unless your doctor says its okay.  Taking these with opioids can cause death.    Benzodiazepines (such as lorazepam         or diazepam)    Muscle relaxers  (such as cyclobenzaprine)    sleeping pills    other opioids    Safe disposal of opioids  Find your area drug take-back program, your pharmacy mail-back program, buy a special disposal bag (such as Deterra) from your pharmacy or flush them down the toilet.  Use the guidelines at:  www.fda.gov/drugs/resourcesforyou

## 2020-06-23 NOTE — PROGRESS NOTES
"Subjective     Joel Pineda is a 44 year old male who presents to clinic today for the following health issues:    HPI   Shingles   Onset: 6/15/2020 when patient first noticed     Description:   Location: Scalp   Character: painful  Itching (Pruritis): no     Progression of Symptoms:  same    History:   Previous similar rash: YES- pt has had shingles 3 times in the past     Alleviating factors:  Valacyclovir     Therapies Tried and outcome: Valacyclovir with mild relief but patient is finished with 1 week supply and still having symptoms, patient wondering if lidocaine ointment might be possible to treat shingles pain    SUBJECTIVE:  Here today following yet another bout of shingles.  Reviewed interval history including ER visit.  Seems to be healing up and scabbing but still a bit painful on the skin of his scalp and wondering about using some topical lidocaine.  Has been working with physical therapy at [facility] and they are working with some manual manipulation and he needs a referral to continue this.  Has increased his Lyrica to 3 times daily and will need a new prescription for this.  Would also like some new pairs of compression stockings for his peripheral edema.  Still having issues of chronic back pain and we are due to update his controlled substance agreement.  Urine drug screen was done in August of last year.    Review of systems otherwise negative.  Past medical, family, and social history reviewed and updated in chart.    OBJECTIVE:  BP (!) 132/92 (BP Location: Right arm, Patient Position: Chair, Cuff Size: Adult Large)   Pulse 84   Temp 98.8  F (37.1  C) (Oral)   Ht 1.981 m (6' 6\")   Wt 141.1 kg (311 lb)   SpO2 98%   BMI 35.94 kg/m    Alert, pleasant, upbeat, and in no apparent discomfort.  S1 and S2 normal, no murmurs, clicks, gallops or rubs. Regular rate and rhythm. Chest is clear; no wheezes or rales. No edema or JVD.  Past labs reviewed with the patient.     ASSESSMENT / PLAN:  (B02.9) " Herpes zoster without complication  (primary encounter diagnosis)  Comment: Discussed mechanism of action of the proposed medication, as well as potential effects, both good and bad.  Patient expressed understanding and agreed with treatment.   Plan: lidocaine (XYLOCAINE) 5 % external ointment            (M79.671,  M79.672) Bilateral foot pain  Comment: Doing well with his physical therapy and will continue this  Plan: PHYSICAL THERAPY REFERRAL            (M51.36) DDD (degenerative disc disease), lumbar  Comment:   Plan: oxyCODONE (ROXICODONE) 5 MG tablet, pregabalin         (LYRICA) 150 MG capsule            (M45.8) Ankylosing spondylitis of sacral region (H)  Comment:   Plan: oxyCODONE (ROXICODONE) 5 MG tablet            (G62.9) Neuropathy  Comment: Now 3 times daily  Plan: pregabalin (LYRICA) 150 MG capsule            (R60.9) Peripheral edema  Comment: Compression stockings  Plan: order for DME            (G89.4) Chronic pain syndrome  Comment: Updated controlled substance agreement today  Plan:     Follow up 3 months  JA Lyles MD    (Chart documentation completed in part with Dragon voice-recognition software.  Even though reviewed some grammatical, spelling, and word errors may remain.)

## 2020-06-24 NOTE — TELEPHONE ENCOUNTER
PRIOR AUTHORIZATION DENIED    Medication: lidocaine (XYLOCAINE) 5 % external ointment     Denial Date: 6/24/2020    Denial Rational: Lidocaine ointment is not covered for the associated diagnosis, however, insurance has approved use of lidocaine patches if provider wishes to prescribe them.      Appeal Information: If provider would like to appeal we will need a detailed letter of medical necessity to start the process. Then re-route this request back to the PA pool.

## 2020-06-28 ENCOUNTER — MYC REFILL (OUTPATIENT)
Dept: FAMILY MEDICINE | Facility: CLINIC | Age: 45
End: 2020-06-28

## 2020-06-28 DIAGNOSIS — E11.8 TYPE 2 DIABETES MELLITUS WITH COMPLICATION, WITHOUT LONG-TERM CURRENT USE OF INSULIN (H): ICD-10-CM

## 2020-07-01 ENCOUNTER — NURSE TRIAGE (OUTPATIENT)
Dept: NURSING | Facility: CLINIC | Age: 45
End: 2020-07-01

## 2020-07-01 DIAGNOSIS — J84.89 PNEUMONITIS, INTERSTITIAL (H): Primary | ICD-10-CM

## 2020-07-01 RX ORDER — PREDNISONE 20 MG/1
TABLET ORAL
Qty: 20 TABLET | Refills: 0 | Status: SHIPPED | OUTPATIENT
Start: 2020-07-01 | End: 2020-07-22

## 2020-07-01 RX ORDER — LEVOFLOXACIN 500 MG/1
500 TABLET, FILM COATED ORAL DAILY
Qty: 10 TABLET | Refills: 0 | Status: SHIPPED | OUTPATIENT
Start: 2020-07-01 | End: 2020-07-28

## 2020-07-01 RX ORDER — METFORMIN HCL 500 MG
1000 TABLET, EXTENDED RELEASE 24 HR ORAL
Qty: 180 TABLET | Refills: 0 | Status: SHIPPED | OUTPATIENT
Start: 2020-07-01 | End: 2020-09-14

## 2020-07-01 NOTE — TELEPHONE ENCOUNTER
Prescription approved per G Refill Protocol.    Sapna Barragan RN, [facility]

## 2020-07-02 ENCOUNTER — MYC MEDICAL ADVICE (OUTPATIENT)
Dept: RHEUMATOLOGY | Facility: CLINIC | Age: 45
End: 2020-07-02

## 2020-07-02 NOTE — PROGRESS NOTES
Spoke with this patient who has a complex medical histroy    Main symptoms at this time are severe chills and sweats/ heat  Mild cough  reent CT at [facility] with interstitial pneumonitis    Has 4th outpbreak of shingles and improving  However, took his enbrel after 2 weeks break   Now with fatigue, hot and cold sensation and pain through the c2c3 region    I think he may have had a partially treated pneumonitis COVID negative at [facility]    I will treat with levaquin and prednisone together and reviewed the risk of tendonitis and rupture    I do not think he has disseminated shingles so prednisone will bring relief    Watch blood sugars on this tapering dose    Continue on high dose lyrica and additional pain management    Follow up 7 - 10 days if improving may need follow up xray    Call if not improving    If signs of sepsis develop then has to go back to the er

## 2020-07-02 NOTE — TELEPHONE ENCOUNTER
Got over a bout of shingles (has had it 4x in past 20 years). States he was feeling better and now pain has returned. States the pain is deeper than the typical postherpetic neuralgia. Completed shingles medications ? Unable to ascertain. States he was feeling better for a solid 4 days. And then today the pain came back hard - sleeping most of the day.     Mostly on back of head - original wound was on hairline - several days later had an additional wound. Initially thought folliculitis - then diagnosed with shingles. Followed by cellulitis.     Initially received rx for valtrex. Few days later did another oncare visit where he was prescribed doxycycline for cellulitis. .  Is on lyrica for lumbago - takes 150 mg TID. Is on embrel.     Paged on call Dr. Li. MD will review chart and make recommendations.     2025 - Dr. Li called back. He called and discussed with patient and prescribed medications. Confirmed with patient - no further questions and/or concerns.    Renay Rashid RN on 7/1/2020 at 8:24 PM    Additional Information    Negative: Difficult to awaken or acting confused (e.g., disoriented, slurred speech)    Negative: Sounds like a life-threatening emergency to the triager    Negative: [1] Localized rash AND [2] doesn't match the SYMPTOMS of shingles    Negative: [1] Back pain AND [2] doesn't match the SYMPTOMS of shingles    Negative: Shingles Vaccine (Recombinant Zoster Vaccine; RZV; Shingrix), questions about    Negative: Patient sounds very sick or weak to the triager    Negative: [1] Shingles rash (matches SYMPTOMS) AND [2] weak immune system (e.g., HIV positive,  cancer chemotherapy, chronic steroid treatment, splenectomy) AND [3] NOT taking antiviral medication    Negative: Shingles rash on the eyelid or tip of the nose    Negative: [1] Shingles rash of face AND [2] eye pain or blurred vision    Negative: [1] Shingles rash of face AND [2] facial weakness    Negative: [1] Shingles rash of  face or ear AND [2] earache or ringing in the ear    Negative: [1] Shingles rash AND [2] spots start appearing other places on body    Negative: Fever > 100.5 F (38.1 C)    SEVERE pain (e.g., excruciating)    Protocols used: SHINGLES-A-AH

## 2020-07-02 NOTE — TELEPHONE ENCOUNTER
Clinic Action Needed:No  Reason for Call: [facility] pharmacist Ge is calling to verify orders received for Levaquin and Prednisone.  He reports a risk of tendon rupture and needed to speak to provider.  Pharmacy closes at 9:00 pm so I paged the on call provider for [facility] to speak to caller directly at [phone].    Dr. Li is on call, page sent @ 8:45 pm via smart web.    Caller advised to call back if they have not heard back from on call provider within 10minutes.  Caller appears to understand directives and agrees with plan.    Routed to: Not routed.    Iilana Jones, RN  [facility]

## 2020-07-03 ENCOUNTER — ANCILLARY PROCEDURE (OUTPATIENT)
Dept: GENERAL RADIOLOGY | Facility: CLINIC | Age: 45
End: 2020-07-03
Attending: INTERNAL MEDICINE
Payer: MEDICARE

## 2020-07-03 ENCOUNTER — VIRTUAL VISIT (OUTPATIENT)
Dept: FAMILY MEDICINE | Facility: CLINIC | Age: 45
End: 2020-07-03
Payer: MEDICARE

## 2020-07-03 ENCOUNTER — MYC MEDICAL ADVICE (OUTPATIENT)
Dept: FAMILY MEDICINE | Facility: CLINIC | Age: 45
End: 2020-07-03

## 2020-07-03 DIAGNOSIS — R09.1 PLEURITIS: Primary | ICD-10-CM

## 2020-07-03 DIAGNOSIS — M45.8 ANKYLOSING SPONDYLITIS OF SACRAL REGION (H): ICD-10-CM

## 2020-07-03 DIAGNOSIS — G89.4 CHRONIC PAIN SYNDROME: ICD-10-CM

## 2020-07-03 DIAGNOSIS — E11.8 TYPE 2 DIABETES MELLITUS WITH COMPLICATION, WITHOUT LONG-TERM CURRENT USE OF INSULIN (H): ICD-10-CM

## 2020-07-03 DIAGNOSIS — R09.1 PLEURITIS: ICD-10-CM

## 2020-07-03 LAB
ANION GAP SERPL CALCULATED.3IONS-SCNC: 5 MMOL/L (ref 3–14)
BUN SERPL-MCNC: 17 MG/DL (ref 7–30)
CALCIUM SERPL-MCNC: 9.4 MG/DL (ref 8.5–10.1)
CHLORIDE SERPL-SCNC: 104 MMOL/L (ref 94–109)
CO2 SERPL-SCNC: 30 MMOL/L (ref 20–32)
CREAT SERPL-MCNC: 0.84 MG/DL (ref 0.66–1.25)
ERYTHROCYTE [SEDIMENTATION RATE] IN BLOOD BY WESTERGREN METHOD: 4 MM/H (ref 0–15)
GFR SERPL CREATININE-BSD FRML MDRD: >90 ML/MIN/{1.73_M2}
GLUCOSE SERPL-MCNC: 105 MG/DL (ref 70–99)
POTASSIUM SERPL-SCNC: 3.9 MMOL/L (ref 3.4–5.3)
SODIUM SERPL-SCNC: 139 MMOL/L (ref 133–144)

## 2020-07-03 PROCEDURE — 36415 COLL VENOUS BLD VENIPUNCTURE: CPT | Performed by: INTERNAL MEDICINE

## 2020-07-03 PROCEDURE — 80048 BASIC METABOLIC PNL TOTAL CA: CPT | Performed by: INTERNAL MEDICINE

## 2020-07-03 PROCEDURE — 99214 OFFICE O/P EST MOD 30 MIN: CPT | Mod: 95 | Performed by: INTERNAL MEDICINE

## 2020-07-03 PROCEDURE — 71046 X-RAY EXAM CHEST 2 VIEWS: CPT

## 2020-07-03 PROCEDURE — 85652 RBC SED RATE AUTOMATED: CPT | Performed by: INTERNAL MEDICINE

## 2020-07-03 NOTE — PROGRESS NOTES
"Joel Pineda is a 45 year old male who is being evaluated via a billable video visit.      The patient has been notified of following:     \"This video visit will be conducted via a call between you and your physician/provider. We have found that certain health care needs can be provided without the need for an in-person physical exam.  This service lets us provide the care you need with a video conversation.  If a prescription is necessary we can send it directly to your pharmacy.  If lab work is needed we can place an order for that and you can then stop by our lab to have the test done at a later time.    Video visits are billed at different rates depending on your insurance coverage.  Please reach out to your insurance provider with any questions.    If during the course of the call the physician/provider feels a video visit is not appropriate, you will not be charged for this service.\"    Patient has given verbal consent for Video visit? Yes  How would you like to obtain your AVS? Kal  Patient would like the video invitation sent by: Text to cell phone: [phone] Kal  Will anyone else be joining your video visit? No    Subjective     Joel Pineda is a 45 year old male who presents today via video visit for the following health issues:  Feverish and 96.7 and fatigues and   Left shoulder pain and chest pain and 3 days in  Stabbing pain and spoke cardiology and ekg looked good  Start: 07/03/2020 10:47 am   Stop: 07/03/2020 10:54 am  HPI  Acute Illness   Acute illness concerns: URI  Onset: x3-4 days    Fever: no     Chills/Sweats: YES    Headache (location?): YES    Sinus Pressure:no    Conjunctivitis:  no    Ear Pain: YES: both    Rhinorrhea: YES    Congestion: YES    Sore Throat: no     Cough: YES-non-productive,     Wheeze: no     Decreased Appetite: YES    Nausea: no    Vomiting: no    Diarrhea:  no    Dysuria/Freq.: no    Fatigue/Achiness: YES    Sick/Strep Exposure: no     Therapies Tried and " outcome: OTC musinex and sudafed         Video Start Time: 10: 47 AM    \    Patient Active Problem List   Diagnosis     Major depressive disorder, recurrent episode (H)     Intermittent asthma     Hyperlipidemia LDL goal <100     Chronic nonallergic rhinitis     Diverticulosis     GERD (gastroesophageal reflux disease)     Anxiety     LLOYD (obstructive sleep apnea)- mild (AHI 11)     Intracranial arachnoid cyst     Facet arthritis of cervical region     Acquired hypothyroidism     Bipolar 2 disorder (H)     Chronic midline low back pain without sciatica     Irritable bowel syndrome with diarrhea     B12 deficiency     Essential hypertension with goal blood pressure less than 140/90     Chronic pain syndrome     Ankylosing spondylitis of sacral region (H)     Morbid obesity due to hypertriglyceridemia (H)     Fatty infiltration of liver     DDD (degenerative disc disease), lumbar     Type 2 diabetes mellitus with complication, without long-term current use of insulin (H)     Peripheral polyneuropathy     History of pulmonary embolism     Ingrown toenail     Lipoma of skin and subcutaneous tissue     Hypertriglyceridemia     Gastroparesis     Orthostatic dizziness     POTS (postural orthostatic tachycardia syndrome)     Past Surgical History:   Procedure Laterality Date     BACK SURGERY  10/07    lumbar discectomy L5-S1     COLONOSCOPY      Note: colonoscopy scheduled with [facility] on Friday, 9/4/15     COSMETIC SURGERY  2012    Nose Exterior - functional     GI SURGERY  August 2013    Sigmoidectomy     HERNIA REPAIR, UMBILICAL  8/23/11    Dr. Evan whiting     INCISION AND DRAINAGE, ABSCESS, COMPLEX  8/23/11    umbilical, Dr. Evan Beavers     LAPAROSCOPIC ASSISTED COLECTOMY LEFT (DESCENDING)  8/15/2013    Procedure: LAPAROSCOPIC ASSISTED COLECTOMY LEFT (DESCENDING);  Laparoscopic Hand Assisted Sigmoid Resection, Mobilization of Splenic Fissure, coloproctoscopy, *Latex Free Room* Anesthesia General with Pain block   ";  Surgeon: Aurora Justice MD;  Location: [facility] OR     NERVE SURGERY  8/18/11    RF ablation @ L3-S1 @ [facility]     RECONSTRUCT NOSE AND SEPTUM (FUNCTIONAL)  10/14/2011    Procedure:RECONSTRUCT NOSE AND SEPTUM (FUNCTIONAL); Functional Septorhinoplasty, Turbinate Reduction, ; Surgeon:CEDRIC CUEVAS; Location:[facility] OR     SINUS SURGERY  10/1/01    ethmoidectomy chronic sinusitis       Social History     Tobacco Use     Smoking status: Never Smoker     Smokeless tobacco: Never Used   Substance Use Topics     Alcohol use: Not Currently     Alcohol/week: 0.0 standard drinks     Drinks per session: 1 or 2     Binge frequency: Weekly     Comment: occ 1/week     Family History   Problem Relation Age of Onset     Musculoskeletal Disorder Mother         back     Anxiety Disorder Mother      Colon Polyps Mother      Ulcerative Colitis Mother         and ischemic small intestine, surgery     Hypertension Mother      Breast Cancer Mother      Osteoporosis Mother      Diabetes Mother         Type 2, Diagnosed in 2014     Depression Mother         Takes Cymbalta to help with chronic pain + depx     Thyroid Disease Mother         Hypothyroidism     Obesity Mother         Under much better control latter half of 2015     Musculoskeletal Disorder Father         back     Substance Abuse Father      Hypertension Father      Hyperlipidemia Father      Depression Father         Off meds for many years. Seems \"ok\"     Heart Disease Maternal Grandmother      Heart Disease Maternal Grandfather      Psychotic Disorder Paternal Grandfather      Suicide Paternal Grandfather      Depression Paternal Grandfather         Pediatrician. Committed suicide by pistol in 1990.     Musculoskeletal Disorder Brother         back     Depression Brother         Expressed as anger and moodiness     Substance Abuse Brother      Substance Abuse Sister      Depression Sister         Mental Health Therapist, yet no anti-depressants?     Anxiety Disorder Sister        "  Mental Health Therapist, yet no anti-anxiety meds?     Other Cancer Other         Bladder Cancer - Fatal     Substance Abuse Brother      Colon Cancer No family hx of      Crohn's Disease No family hx of      Anesthesia Reaction No family hx of      Cancer No family hx of         No family history of skin cancer         Current Outpatient Medications   Medication Sig Dispense Refill     acetaminophen 500 MG CAPS Take 500 mg by mouth every 4 hours as needed 60 capsule      albuterol (PROAIR HFA/PROVENTIL HFA/VENTOLIN HFA) 108 (90 Base) MCG/ACT inhaler Inhale 2 puffs into the lungs every 4 hours as needed for shortness of breath / dyspnea or wheezing 8.5 g 11     albuterol (PROVENTIL) (2.5 MG/3ML) 0.083% neb solution Take 1 vial (2.5 mg) by nebulization every 6 hours as needed for shortness of breath / dyspnea or wheezing 200 mL 3     ALPRAZolam (XANAX) 0.5 MG tablet Take 1 tablet up to three times daily.       aspirin (ASA) 81 MG tablet Take 81 mg by mouth daily        cetirizine (ZYRTEC) 10 MG tablet Take 1 tablet (10 mg) by mouth At Bedtime 30 tablet 11     cholecalciferol (VITAMIN D3) 95167 units (1250 mcg) capsule Take one capsule every two weeks. 24 capsule 1     cyanocobalamin (CYANOCOBALAMIN) 1000 MCG/ML injection Inject 1 mL (1,000 mcg) into the muscle every 30 days (Patient taking differently: Inject 1 mL into the muscle every 30 days Taking this injection every 3 weeks per pt report) 10 mL 0     DULoxetine (CYMBALTA) 60 MG capsule Take 60 mg by mouth 2 times daily        EPINEPHrine (EPIPEN/ADRENACLICK/OR ANY BX GENERIC EQUIV) 0.3 MG/0.3ML injection 2-pack Inject 0.3 mLs (0.3 mg) into the muscle once as needed for anaphylaxis 0.6 mL 3     etanercept (ENBREL SURECLICK) 50 MG/ML autoinjector Inject 50 mg Subcutaneous once a week Hold for signs of infection, and seek medical attention. 4 mL 5     famotidine (PEPCID) 20 MG tablet Prior to administration of Humira every 2 weeks (Patient taking differently:  Patient is taking this 1 time weekly.)       fluticasone-salmeterol (ADVAIR) 500-50 MCG/DOSE inhaler Inhale 1 puff into the lungs every 12 hours 3 Inhaler 3     Ginger, Zingiber officinalis, (GINGER ROOT) 550 MG CAPS capsule Take 550 mg by mouth daily Up to three times daily        ketotifen (ZADITOR) 0.025 % ophthalmic solution 1 drop 2 times daily       lamoTRIgine (LAMICTAL) 100 MG tablet Take 200 mg by mouth daily       levofloxacin (LEVAQUIN) 500 MG tablet Take 1 tablet (500 mg) by mouth daily for 10 days 10 tablet 0     levothyroxine (SYNTHROID/LEVOTHROID) 75 MCG tablet TAKE 1 TABLET EVERY MORNING 90 tablet 1     lidocaine (XYLOCAINE) 5 % external ointment Apply topically 4 times daily as needed (pain) 50 g 2     Liniments (SALONPAS EX) Externally apply 1 Application topically daily as needed       melatonin 3 MG tablet Take 6 mg by mouth nightly as needed for sleep       metFORMIN (GLUCOPHAGE-XR) 500 MG 24 hr tablet Take 2 tablets (1,000 mg) by mouth daily (with dinner) 180 tablet 0     metoclopramide (REGLAN) 5 MG tablet Take 1 tablet (5 mg) by mouth 3 times daily as needed (nausea) 90 tablet 1     metoprolol succinate ER (TOPROL-XL) 200 MG 24 hr tablet Take 1 tablet (200 mg) by mouth 2 times daily 180 tablet 0     montelukast (SINGULAIR) 10 MG tablet Take 1 tablet (10 mg) by mouth every evening 90 tablet 1     nabumetone (RELAFEN) 500 MG tablet Take 1-2 tablets (500-1,000 mg) by mouth 2 times daily (with meals) as needed for back/joint pain. 360 tablet 0     omega-3 acid ethyl esters (LOVAZA) 1 g capsule TAKE 2 CAPSULES BY MOUTH TWICE DAILY. 360 capsule 2     omeprazole 20 MG tablet Take 20 mg by mouth daily        order for DME Equipment being ordered: Assure Compression stockings (ANNETTA) Large, closed toe, thigh-high 30-40 compression 2 Units 3     order for DME Jobst thigh high hose:  30-40 mmHg    Equipment being ordered: Jobst thigh high hose 30-40 mmHg 1 Units 1     order for DME Equipment being  ordered: lumbosacral belt/brace 1 Units 0     order for DME Respironics REMSTAR 60 Series Auto CPAP 9-13 cm H2O, Wisp nasal mask w/a large cushion and a chinstrap       oxyCODONE (ROXICODONE) 5 MG tablet Take 1-2 tablets (5-10 mg) by mouth every 6 hours as needed for pain (maximum 4 tablet(s) per day) 35 tablet 0     predniSONE (DELTASONE) 20 MG tablet Take 3 tabs by mouth daily x 3 days, then 2 tabs daily x 3 days, then 1 tab daily x 3 days, then 1/2 tab daily x 3 days. 20 tablet 0     pregabalin (LYRICA) 150 MG capsule Take 1 capsule (150 mg) by mouth 3 times daily 270 capsule 1     QUEtiapine (SEROQUEL) 25 MG tablet Take 25 mg by mouth 4 times daily as needed Taking 50 MG @ HS per pt report       ramipril (ALTACE) 10 MG capsule Take 1 capsule (10 mg) by mouth daily 90 capsule 1     rizatriptan (MAXALT-MLT) 5 MG ODT Take 1 tablet (5 mg) by mouth at onset of headache for migraine 30 tablet 5     rosuvastatin (CRESTOR) 40 MG tablet Take 1 tablet (40 mg) by mouth daily 90 tablet 0     syringe, disposable, (BD TUBERCULIN SYRINGE) 1 ML MISC Equipment being ordered: 1 ml tuberculin syringes to be used for Vitamin B12 injections. 12 each 11     vitamin C (ASCORBIC ACID) 500 MG tablet Take 500 mg by mouth daily       Allergies   Allergen Reactions     Amoxicillin-Pot Clavulanate Difficulty breathing     Banana Shortness Of Breath     Pt reports organic Banana is okay.      Nitroglycerin Palpitations     Penicillins Anaphylaxis     Provigil [Modafinil] Shortness Of Breath     headache     Gadolinium Hives and Itching     Patient was premedicated for the contrast allergy. He did still have a reaction a few hours after injection. Hives and itching. Dr. Gomez told tech to inform pt he should only have contrast again in the future when premedicated and at a hospital. Not at an outpatient facility.      Ketoconazole      Topical cream caused swelling and itching     Dye [Contrast Dye] Other (See Comments) and Hives      Moderate flushing, CT contrast     Golimumab      Hives, bradycardia, face swelling     Neurontin [Gabapentin] Hives     Moderate hives     Nortriptyline Hives     Varicella Virus Vaccine Live      Rash     Flagyl [Metronidazole Hcl] Palpitations and Hives     Latex Rash     Metronidazole Palpitations, Other (See Comments) and Rash     dizziness (versus ciprofloxacin taken at same time)     No Clinical Screening - See Comments Rash     Nitrile gloves     Recent Labs   Lab Test 07/03/20  1251 01/24/20  1637 09/26/19  1010 09/13/19  0943  07/21/19  1954  03/19/19  0807  12/24/18  1114   A1C  --  6.0*  --  5.9*  --   --   --  6.1*  --  6.7*   LDL  --   --   --  Cannot estimate LDL when triglyceride exceeds 400 mg/dL  64  --   --   --  Cannot estimate LDL when triglyceride exceeds 400 mg/dL  53  --  Cannot estimate LDL when triglyceride exceeds 400 mg/dL   HDL  --   --   --  33*  --   --   --  28*  --  34*   TRIG  --   --   --  468*  --   --   --  583*  --  434*   ALT  --  61 53  --   --  49   < > 51   < >  --    CR 0.84 0.81 0.83  --    < > 0.84   < > 1.00   < >  --    GFRESTIMATED >90 >90 >90  --    < > >90   < > >90   < >  --    GFRESTBLACK >90 >90 >90  --    < > >90   < > >90   < >  --    POTASSIUM 3.9 4.4  --   --    < > 4.3   < > 4.3   < >  --    TSH  --  2.17  --   --   --   --   --   --   --  2.63    < > = values in this interval not displayed.      BP Readings from Last 3 Encounters:   06/23/20 (!) 132/92   06/04/20 121/78   05/22/20 130/83    Wt Readings from Last 3 Encounters:   06/23/20 141.1 kg (311 lb)   06/04/20 141.3 kg (311 lb 8 oz)   05/22/20 138.3 kg (305 lb)                    Reviewed and updated as needed this visit by Provider         Review of Systems   Constitutional, HEENT, cardiovascular, pulmonary, gi and gu systems are negative, except as otherwise noted.      Objective             Physical Exam     GENERAL: Healthy, alert and no distress  EYES: Eyes grossly normal to inspection.  No  discharge or erythema, or obvious scleral/conjunctival abnormalities.  RESP: No audible wheeze, cough, or visible cyanosis.  No visible retractions or increased work of breathing.    SKIN: Visible skin clear. No significant rash, abnormal pigmentation or lesions.  NEURO: Cranial nerves grossly intact.  Mentation and speech appropriate for age.  PSYCH: Mentation appears normal, affect normal/bright, judgement and insight intact, normal speech and appearance well-groomed.      Diagnostic Test Results:  Labs reviewed in Epic  Results for orders placed or performed in visit on 07/03/20   XR Chest 2 Views     Status: None    Narrative    CHEST TWO VIEWS  7/3/2020 1:00 PM     HISTORY: 45-year-old patient with pleuritis.       Impression    IMPRESSION: Since February 28, 2018, heart size is normal. No pleural  effusion, pneumothorax, or abnormal area of consolidation.    CAROLYN PITTMAN MD   Results for orders placed or performed in visit on 07/03/20   **ESR FUTURE anytime     Status: None   Result Value Ref Range    Sed Rate 4 0 - 15 mm/h   Basic metabolic panel - FUTURE  S+90     Status: Abnormal   Result Value Ref Range    Sodium 139 133 - 144 mmol/L    Potassium 3.9 3.4 - 5.3 mmol/L    Chloride 104 94 - 109 mmol/L    Carbon Dioxide 30 20 - 32 mmol/L    Anion Gap 5 3 - 14 mmol/L    Glucose 105 (H) 70 - 99 mg/dL    Urea Nitrogen 17 7 - 30 mg/dL    Creatinine 0.84 0.66 - 1.25 mg/dL    GFR Estimate >90 >60 mL/min/[1.73_m2]    GFR Estimate If Black >90 >60 mL/min/[1.73_m2]    Calcium 9.4 8.5 - 10.1 mg/dL           Assessment & Plan       ICD-10-CM    1. Pleuritis  R09.1 Basic metabolic panel - FUTURE  S+90     XR Chest 2 Views     **CBC with platelets differential FUTURE 2mo     **ESR FUTURE anytime   2. Chronic pain syndrome  G89.4    3. Type 2 diabetes mellitus with complication, without long-term current use of insulin (H)  E11.8    4. Ankylosing spondylitis of sacral region (H)  M45.8       BP     Data Unavailable   7/6/2020    Lab Results   Component Value Date     07/03/2020     Lab Results   Component Value Date    A1C 6.0 01/24/2020     Lab Results   Component Value Date    LDL  09/13/2019     Cannot estimate LDL when triglyceride exceeds 400 mg/dL    LDL 64 09/13/2019     Lab Results   Component Value Date    MICROL 12 01/24/2020     No results found for: MICROALBUMIN    Regular exercise    No follow-ups on file.    Peng Li MD  [facility]      Video-Visit Details    Type of service:  Video Visit    Video End Time:10:54 AM    Originating Location (pt. Location): Home    Distant Location (provider location):  [facility]     Platform used for Video Visit: Sophia    No follow-ups on file.       Peng Li MD

## 2020-07-05 ENCOUNTER — MYC REFILL (OUTPATIENT)
Dept: FAMILY MEDICINE | Facility: CLINIC | Age: 45
End: 2020-07-05

## 2020-07-05 DIAGNOSIS — M45.8 ANKYLOSING SPONDYLITIS OF SACRAL REGION (H): ICD-10-CM

## 2020-07-05 DIAGNOSIS — M51.369 DDD (DEGENERATIVE DISC DISEASE), LUMBAR: ICD-10-CM

## 2020-07-05 RX ORDER — OXYCODONE HYDROCHLORIDE 5 MG/1
5-10 TABLET ORAL EVERY 6 HOURS PRN
Qty: 35 TABLET | Refills: 0 | Status: CANCELLED | OUTPATIENT
Start: 2020-07-05

## 2020-07-07 ENCOUNTER — VIRTUAL VISIT (OUTPATIENT)
Dept: FAMILY MEDICINE | Facility: CLINIC | Age: 45
End: 2020-07-07
Payer: MEDICARE

## 2020-07-07 DIAGNOSIS — R10.84 ABDOMINAL PAIN, GENERALIZED: ICD-10-CM

## 2020-07-07 DIAGNOSIS — R09.1 PLEURITIS: Primary | ICD-10-CM

## 2020-07-07 DIAGNOSIS — M51.369 DDD (DEGENERATIVE DISC DISEASE), LUMBAR: ICD-10-CM

## 2020-07-07 DIAGNOSIS — M45.8 ANKYLOSING SPONDYLITIS OF SACRAL REGION (H): ICD-10-CM

## 2020-07-07 PROCEDURE — 99442 ZZC PHYSICIAN TELEPHONE EVALUATION 11-20 MIN: CPT | Performed by: FAMILY MEDICINE

## 2020-07-07 RX ORDER — OXYCODONE HYDROCHLORIDE 5 MG/1
5-10 TABLET ORAL EVERY 6 HOURS PRN
Qty: 35 TABLET | Refills: 0 | Status: SHIPPED | OUTPATIENT
Start: 2020-07-07 | End: 2020-07-20

## 2020-07-07 ASSESSMENT — PAIN SCALES - GENERAL: PAINLEVEL: NO PAIN (0)

## 2020-07-07 ASSESSMENT — PATIENT HEALTH QUESTIONNAIRE - PHQ9: SUM OF ALL RESPONSES TO PHQ QUESTIONS 1-9: 15

## 2020-07-07 NOTE — PATIENT INSTRUCTIONS
At [facility], we strive to deliver an exceptional experience to you, every time we see you. If you receive a survey, please complete it as we do value your feedback.  If you have MyChart, you can expect to receive results automatically within 24 hours of their completion.  Your provider will send a note interpreting your results as well.   If you do not have MyChart, you should receive your results in about a week by mail.    Your care team:     Family Medicine   SHERWIN Ruelas APRN CNP S. Matthew Hockett, MD Pamela Kolacz, MD Angela Wermerskirchen, MD    Internal Medicine  Kanu Knapp MD     Clinic hours: Monday - Wednesday 7 am-7 pm   Thursdays and Fridays 7 am-5 pm.     [facility]: Monday - Friday 11 am-9 pm,   Saturday and Sunday 9 am-5 pm.     [facility]: Monday - Thursday 8 am - 7 pm; Friday 8 am - 6 pm    Clinic: [phone]   [facility]: [phone]     Use [email or web address] for 24/7 diagnosis and treatment of dozens of conditions.

## 2020-07-07 NOTE — PROGRESS NOTES
"Joel Pineda is a 45 year old male who is being evaluated via a billable telephone visit.      The patient has been notified of following:     \"This telephone visit will be conducted via a call between you and your physician/provider. We have found that certain health care needs can be provided without the need for a physical exam.  This service lets us provide the care you need with a short phone conversation.  If a prescription is necessary we can send it directly to your pharmacy.  If lab work is needed we can place an order for that and you can then stop by our lab to have the test done at a later time.    Telephone visits are billed at different rates depending on your insurance coverage. During this emergency period, for some insurers they may be billed the same as an in-person visit.  Please reach out to your insurance provider with any questions.    If during the course of the call the physician/provider feels a telephone visit is not appropriate, you will not be charged for this service.\"    Patient has given verbal consent for Telephone visit?  Yes    What phone number would you like to be contacted at? [phone]    How would you like to obtain your AVS? Kal Garcia     Joel Pineda is a 45 year old male who presents via phone visit today for the following health issues:    HPI  Acute Illness     Onset: follow up     Fever: no    Chills/Sweats: YES- really bad sweats     Headache (location?): YES    Sinus Pressure:no    Conjunctivitis:  YES- red right     Ear Pain: no    Rhinorrhea: YES    Congestion: YES    Sore Throat: YES     Cough: YES-productive of clear sputum    Wheeze: no    Decreased Appetite: YES    Nausea: YES    Vomiting: no    Diarrhea:  YES- abdomial pain     Dysuria/Freq.: no    Fatigue/Achiness: YES    Sick/Strep Exposure: no     Therapies Tried and outcome: cream of wheat- now only passing gas, musinex, prednisone       Spoke with patient via phone in follow-up of the " above.  Reviewed interval history.  Was on doxycycline initially for some secondary infection to his shingles.  Now is finishing a course of Levaquin and prednisone for presumed pneumonitis and pleurisy.  Still occasionally having some sweats at night and some chest congestion though things are much better during the day and overall he feels that he has improved.  Was having some abdominal pain and loose stools and was very concerned about C. difficile but in the last couple of days that has improved and stools have tightened up.  We reviewed his CT scan that did show some groundglass appearing nodules in the lower lung fields.  Discussed how that may fit with his diagnosis of a pneumonitis and whether follow-up is needed.    Reviewed and updated as needed this visit by Provider         Review of Systems   Constitutional, HEENT, cardiovascular, pulmonary, gi and gu systems are negative, except as otherwise noted.       Objective   Reported vitals:  There were no vitals taken for this visit.   healthy, alert and no distress  PSYCH: Alert and oriented times 3; coherent speech, normal   rate and volume, able to articulate logical thoughts, able   to abstract reason, no tangential thoughts, no hallucinations   or delusions  His affect is normal  RESP: No cough, no audible wheezing, able to talk in full sentences  Remainder of exam unable to be completed due to telephone visits    Diagnostic Test Results:  Labs reviewed in Epic        Assessment/Plan:  1. Pleuritis  Seems to be improving steadily and we will give this some more time.  Could always consider repeating CT scan at some point in the future if needed    2. Abdominal pain, generalized  Seems to be a resolving issue but if returns and certainly if he is having diarrhea I would favor setting up C. difficile testing    3. Ankylosing spondylitis of sacral region (H)    - oxyCODONE (ROXICODONE) 5 MG tablet; Take 1-2 tablets (5-10 mg) by mouth every 6 hours as needed  for pain (maximum 4 tablet(s) per day)  Dispense: 35 tablet; Refill: 0    4. DDD (degenerative disc disease), lumbar    - oxyCODONE (ROXICODONE) 5 MG tablet; Take 1-2 tablets (5-10 mg) by mouth every 6 hours as needed for pain (maximum 4 tablet(s) per day)  Dispense: 35 tablet; Refill: 0    Return in about 2 weeks (around 7/21/2020) for Contact me with progress.      Phone call duration:  13 minutes    Ksenia Lyles MD

## 2020-07-18 ENCOUNTER — VIRTUAL VISIT (OUTPATIENT)
Dept: URGENT CARE | Facility: CLINIC | Age: 45
End: 2020-07-18
Payer: MEDICARE

## 2020-07-18 ENCOUNTER — NURSE TRIAGE (OUTPATIENT)
Dept: NURSING | Facility: CLINIC | Age: 45
End: 2020-07-18

## 2020-07-18 DIAGNOSIS — R19.7 DIARRHEA, UNSPECIFIED TYPE: ICD-10-CM

## 2020-07-18 DIAGNOSIS — R05.9 COUGH: Primary | ICD-10-CM

## 2020-07-18 DIAGNOSIS — R07.89 CHEST WALL PAIN: ICD-10-CM

## 2020-07-18 PROCEDURE — 99441 ZZC PHYSICIAN TELEPHONE EVALUATION 5-10 MIN: CPT | Performed by: PHYSICIAN ASSISTANT

## 2020-07-18 RX ORDER — METHYLPREDNISOLONE 4 MG
TABLET, DOSE PACK ORAL
Qty: 21 TABLET | Refills: 0 | Status: SHIPPED | OUTPATIENT
Start: 2020-07-18 | End: 2020-07-28

## 2020-07-18 NOTE — PROGRESS NOTES
"Joel Pineda is a 45 year old male who is being evaluated via a billable telephone visit.      The patient has been notified of following:     \"This telephone visit will be conducted via a call between you and your physician/provider. We have found that certain health care needs can be provided without the need for a physical exam.  This service lets us provide the care you need with a short phone conversation.  If a prescription is necessary we can send it directly to your pharmacy.  If lab work is needed we can place an order for that and you can then stop by our lab to have the test done at a later time.    Telephone visits are billed at different rates depending on your insurance coverage. During this emergency period, for some insurers they may be billed the same as an in-person visit.  Please reach out to your insurance provider with any questions.    If during the course of the call the physician/provider feels a telephone visit is not appropriate, you will not be charged for this service.\"    Patient has given verbal consent for Telephone visit?  Yes    How would you like to obtain your AVS? MyChart    Subjective     Joel Pineda is a 45 year old male who presents via phone visit today for the following health issues:    HPI  Patient is having a phone visit today due to concern about his lungs. He had shingles in June, was noted to have ground glass appearance on lungs, he has been treated for pneumonia the beginning of July, he has since had diarrhea which resolved and then has returned, he has also had a return of a dry cough, chest pain and SOB. He says he has been off the Levaquin and Steroid on July 10 th and was good for about 4-5 days but now symptoms are returning. He has not had fever and his cough has not been productive. His stools have been loose and at time     BP Readings from Last 3 Encounters:   06/23/20 (!) 132/92   06/04/20 121/78   05/22/20 130/83    Wt Readings from Last 3 Encounters: "   06/23/20 141.1 kg (311 lb)   06/04/20 141.3 kg (311 lb 8 oz)   05/22/20 138.3 kg (305 lb)                    Reviewed and updated as needed this visit by Provider         Review of Systems   Constitutional, HEENT, cardiovascular, pulmonary, GI, , musculoskeletal, neuro, skin, endocrine and psych systems are negative, except as otherwise noted.       Objective   Reported vitals:  There were no vitals taken for this visit.   alert and no distress  PSYCH: Alert and oriented times 3; coherent speech, normal   rate and volume, able to articulate logical thoughts, able   to abstract reason, no tangential thoughts, no hallucinations   or delusions  His affect is normal  RESP: No cough, no audible wheezing, able to talk in full sentences  Remainder of exam unable to be completed due to telephone visits    Diagnostic Test Results:  Labs reviewed in Epic        Assessment/Plan:    ASSESSMENT AND PLAN    ICD-10-CM    1. Cough  R05 Symptomatic COVID-19 Virus (Coronavirus) by PCR   2. Diarrhea, unspecified type  R19.7 Symptomatic COVID-19 Virus (Coronavirus) by PCR   3. Chest wall pain  R07.89 Symptomatic COVID-19 Virus (Coronavirus) by PCR     methylPREDNISolone (MEDROL DOSEPAK) 4 MG tablet therapy pack     I will have patient retest for Covid due to recurrence of cough and diarrhea. I will also prescribe a medrol pack for chest wall pain and instructed him to be seen in person if fevers, worsening cough or chest symptoms. If diarrhea is worsening or not resolving I would also have him follow up for further evaluation.     Phone call duration:  10 minutes    Mariana Tena PA-C

## 2020-07-18 NOTE — TELEPHONE ENCOUNTER
SOB and Cough.   Treated for pneumonia with antibiotics and steroids.   Chest pain, similar to when he had pleurisy.   Chest pain comes and goes, lasts about 20 seconds at a time.     Joel was transferred to [facility] in scheduling to make phone visit with urgent care provider.   RN advised patient to call back with any new or worsening sx.   Lashell Fair RN   [facility] RN Triage       Reason for Disposition    MILD difficulty breathing (e.g., minimal/no SOB at rest, SOB with walking, pulse <100)    Chest pain or pressure    Additional Information    Negative: MODERATE difficulty breathing (e.g., speaks in phrases, SOB even at rest, pulse 100-120)    Negative: SEVERE or constant chest pain or pressure (Exception: mild central chest pain, present only when coughing)    Negative: Patient sounds very sick or weak to the triager    Negative: [1] COVID-19 exposure AND [2] no symptoms    Negative: COVID-19 and Breastfeeding, questions about    Negative: [1] Adult with possible COVID-19 symptoms AND [2] triager concerned about severity of symptoms or other causes    Negative: SEVERE difficulty breathing (e.g., struggling for each breath, speaks in single words)    Negative: Difficult to awaken or acting confused (e.g., disoriented, slurred speech)    Negative: Bluish (or gray) lips or face now    Negative: Shock suspected (e.g., cold/pale/clammy skin, too weak to stand, low BP, rapid pulse)    Negative: Sounds like a life-threatening emergency to the triager    Protocols used: CORONAVIRUS (COVID-19) DIAGNOSED OR [id number] 5.16.20

## 2020-07-19 ENCOUNTER — MYC REFILL (OUTPATIENT)
Dept: FAMILY MEDICINE | Facility: CLINIC | Age: 45
End: 2020-07-19

## 2020-07-19 DIAGNOSIS — I10 ESSENTIAL HYPERTENSION WITH GOAL BLOOD PRESSURE LESS THAN 140/90: Chronic | ICD-10-CM

## 2020-07-20 ENCOUNTER — MYC REFILL (OUTPATIENT)
Dept: FAMILY MEDICINE | Facility: CLINIC | Age: 45
End: 2020-07-20

## 2020-07-20 DIAGNOSIS — M51.369 DDD (DEGENERATIVE DISC DISEASE), LUMBAR: ICD-10-CM

## 2020-07-20 DIAGNOSIS — M45.8 ANKYLOSING SPONDYLITIS OF SACRAL REGION (H): ICD-10-CM

## 2020-07-21 ENCOUNTER — TRANSFERRED RECORDS (OUTPATIENT)
Dept: HEALTH INFORMATION MANAGEMENT | Facility: CLINIC | Age: 45
End: 2020-07-21

## 2020-07-21 DIAGNOSIS — R07.89 CHEST WALL PAIN: ICD-10-CM

## 2020-07-21 DIAGNOSIS — R05.9 COUGH: ICD-10-CM

## 2020-07-21 DIAGNOSIS — R19.7 DIARRHEA, UNSPECIFIED TYPE: ICD-10-CM

## 2020-07-21 PROCEDURE — U0003 INFECTIOUS AGENT DETECTION BY NUCLEIC ACID (DNA OR RNA); SEVERE ACUTE RESPIRATORY SYNDROME CORONAVIRUS 2 (SARS-COV-2) (CORONAVIRUS DISEASE [COVID-19]), AMPLIFIED PROBE TECHNIQUE, MAKING USE OF HIGH THROUGHPUT TECHNOLOGIES AS DESCRIBED BY CMS-2020-01-R: HCPCS | Performed by: PHYSICIAN ASSISTANT

## 2020-07-22 ENCOUNTER — VIRTUAL VISIT (OUTPATIENT)
Dept: FAMILY MEDICINE | Facility: CLINIC | Age: 45
End: 2020-07-22
Payer: MEDICARE

## 2020-07-22 DIAGNOSIS — E11.8 TYPE 2 DIABETES MELLITUS WITH COMPLICATION, WITHOUT LONG-TERM CURRENT USE OF INSULIN (H): ICD-10-CM

## 2020-07-22 DIAGNOSIS — M45.8 ANKYLOSING SPONDYLITIS OF SACRAL REGION (H): ICD-10-CM

## 2020-07-22 DIAGNOSIS — J01.90 ACUTE SINUSITIS WITH SYMPTOMS > 10 DAYS: Primary | ICD-10-CM

## 2020-07-22 LAB
SARS-COV-2 RNA SPEC QL NAA+PROBE: NOT DETECTED
SPECIMEN SOURCE: NORMAL

## 2020-07-22 PROCEDURE — 99441 ZZC PHYSICIAN TELEPHONE EVALUATION 5-10 MIN: CPT | Performed by: PREVENTIVE MEDICINE

## 2020-07-22 RX ORDER — DOXYCYCLINE 100 MG/1
100 CAPSULE ORAL 2 TIMES DAILY
Qty: 14 CAPSULE | Refills: 0 | Status: SHIPPED | OUTPATIENT
Start: 2020-07-22 | End: 2020-08-05

## 2020-07-22 RX ORDER — METOPROLOL SUCCINATE 200 MG/1
200 TABLET, EXTENDED RELEASE ORAL 2 TIMES DAILY
Qty: 180 TABLET | Refills: 0 | Status: SHIPPED | OUTPATIENT
Start: 2020-07-22 | End: 2020-10-04

## 2020-07-22 NOTE — TELEPHONE ENCOUNTER
Controlled Substance Refill Request for Oxycodone  Problem List Complete:  Yes  Chronic pain syndrome   Problem Detail     Noted:  4/4/2017    Priority:  Medium    Overview Addendum 6/23/2020  2:17 PM by Ksenia Lyles MD    Patient is followed by Ksenia Lyles MD for ongoing prescription of pain medication.  All refills should only be approved by this provider, or covering partner.     Medication(s): oxy 5.   Maximum quantity per month: 40  Clinic visit frequency required: Q3  months      Controlled substance agreement: 6/23/2020  UDS: Aug 2019     Encounter-Level CSA - 04/04/2017:            Controlled Substance Agreement - Scan on 4/11/2017  1:30 PM : CONTROLLED SUBSTANCE AGREEMENT (below)                   Pain Clinic evaluation in the past: Yes     DIRE Total Score(s):  No flowsheet data found.     Last [place] website verification:  05/22/20    [email or web address]     checked in past 3 months?  Yes 5/22/20   RX [place] ([place]) reviewed:  not reviewed/not due - last done on 5/22/20  [place] profile:  [email or web address]  Last Written Prescription Date:  7/7/20  Last Fill Quantity: 35 tablets  # refills: 0   Last office visit: 9/23/2019 with prescribing provider:  9/23/19   Future Office Visit:  None        Martell Chaves RN, BSN, PHN

## 2020-07-22 NOTE — TELEPHONE ENCOUNTER
Routing refill request to provider for review/approval because:  Failed BP     Sapna Barragan RN, [facility] Triage

## 2020-07-22 NOTE — PATIENT INSTRUCTIONS
At [facility], we strive to deliver an exceptional experience to you, every time we see you. If you receive a survey, please complete it as we do value your feedback.  If you have MyChart, you can expect to receive results automatically within 24 hours of their completion.  Your provider will send a note interpreting your results as well.   If you do not have MyChart, you should receive your results in about a week by mail.    Your care team:                            Family Medicine Internal Medicine   MD Houston Frances MD Shantel Branch-Fleming, MD Srinivasa Vaka, MD Katya Georgiev PA-C Megan Hill, APRN CNP    Javier Cavazos, MD Pediatrics   Kelver Obando, PALeydaC  Magdalene Scherer, CNP MD Radha Victor APRN CNP   MD Humaira Devlin MD Deborah Mielke, MD Diana Kauffman, APRN CNP  Faith Shanks, PALeydaC  Jing Priest, CNP  MD Felipa Narayan MD Angela Wermerskirchen, MD      Clinic hours: Monday - Thursday 7 am-7 pm; Fridays 7 am-5 pm.   Urgent care: Monday - Friday 11 am-9 pm; Saturday and Sunday 9 am-5 pm.    Clinic: [phone]       [facility]: Monday - Thursday 8 am - 7 pm; Friday 8 am - 6 pm  [facility]: [phone]     Use [email or web address] for 24/7 diagnosis and treatment of dozens of conditions.

## 2020-07-22 NOTE — PROGRESS NOTES
"Joel Pineda is a 45 year old male who is being evaluated via a billable telephone visit.      The patient has been notified of following:     \"This telephone visit will be conducted via a call between you and your physician/provider. We have found that certain health care needs can be provided without the need for a physical exam.  This service lets us provide the care you need with a short phone conversation.  If a prescription is necessary we can send it directly to your pharmacy.  If lab work is needed we can place an order for that and you can then stop by our lab to have the test done at a later time.    Telephone visits are billed at different rates depending on your insurance coverage. During this emergency period, for some insurers they may be billed the same as an in-person visit.  Please reach out to your insurance provider with any questions.    If during the course of the call the physician/provider feels a telephone visit is not appropriate, you will not be charged for this service.\"    Patient has given verbal consent for Telephone visit?  Yes    What phone number would you like to be contacted at? [phone]    How would you like to obtain your AVS? Kal Garcia     Joel Pineda is a 45 year old male who presents via phone visit today for the following health issues:    HPI    Acute Illness   Acute illness concerns: cough/sinus  Onset: ongoing     Fever: no    Chills/Sweats: YES    Headache (location?): YES    Sinus Pressure:YES    Conjunctivitis:  YES: both    Ear Pain: no    Rhinorrhea: YES    Congestion: YES    Sore Throat: no     Cough: YES-productive of yellow sputum    Wheeze: YES    Decreased Appetite: YES    Nausea: YES    Vomiting: no    Diarrhea:  YES    Dysuria/Freq.: no    Fatigue/Achiness: YES    Sick/Strep Exposure: no     Therapies Tried and outcome: OTC Medications     Covid testing results are pending.    CT chest also done 6/2020  Also treated for pleurisy, was " started on Levaquin and Prednisone  July 17 was put on a Medrol Dose jasson  White sputum starting to turn yellow   Now larger amount of sputum  Still on Medrol Jasson  Sinus headaches getting worse  Has been on Enbrel  No fever    Patient Active Problem List   Diagnosis     Major depressive disorder, recurrent episode (H)     Intermittent asthma     Hyperlipidemia LDL goal <100     Chronic nonallergic rhinitis     Diverticulosis     GERD (gastroesophageal reflux disease)     Anxiety     LLOYD (obstructive sleep apnea)- mild (AHI 11)     Intracranial arachnoid cyst     Facet arthritis of cervical region     Acquired hypothyroidism     Bipolar 2 disorder (H)     Chronic midline low back pain without sciatica     Irritable bowel syndrome with diarrhea     B12 deficiency     Essential hypertension with goal blood pressure less than 140/90     Chronic pain syndrome     Ankylosing spondylitis of sacral region (H)     Morbid obesity due to hypertriglyceridemia (H)     Fatty infiltration of liver     DDD (degenerative disc disease), lumbar     Type 2 diabetes mellitus with complication, without long-term current use of insulin (H)     Peripheral polyneuropathy     History of pulmonary embolism     Ingrown toenail     Lipoma of skin and subcutaneous tissue     Hypertriglyceridemia     Gastroparesis     Orthostatic dizziness     POTS (postural orthostatic tachycardia syndrome)     Past Surgical History:   Procedure Laterality Date     BACK SURGERY  10/07    lumbar discectomy L5-S1     COLONOSCOPY      Note: colonoscopy scheduled with [facility] on Friday, 9/4/15     COSMETIC SURGERY  2012    Nose Exterior - functional     GI SURGERY  August 2013    Sigmoidectomy     HERNIA REPAIR, UMBILICAL  8/23/11    Dr. Evan whiting     INCISION AND DRAINAGE, ABSCESS, COMPLEX  8/23/11    umbilical, Dr. Evan Beavers     LAPAROSCOPIC ASSISTED COLECTOMY LEFT (DESCENDING)  8/15/2013    Procedure: LAPAROSCOPIC ASSISTED COLECTOMY LEFT (DESCENDING);   "Laparoscopic Hand Assisted Sigmoid Resection, Mobilization of Splenic Fissure, coloproctoscopy, *Latex Free Room* Anesthesia General with Pain block  ;  Surgeon: Aurora Justice MD;  Location: [facility] OR     NERVE SURGERY  8/18/11    RF ablation @ L3-S1 @ [facility]     RECONSTRUCT NOSE AND SEPTUM (FUNCTIONAL)  10/14/2011    Procedure:RECONSTRUCT NOSE AND SEPTUM (FUNCTIONAL); Functional Septorhinoplasty, Turbinate Reduction, ; Surgeon:CEDRIC CUEVAS; Location:[facility] OR     SINUS SURGERY  10/1/01    ethmoidectomy chronic sinusitis       Social History     Tobacco Use     Smoking status: Never Smoker     Smokeless tobacco: Never Used   Substance Use Topics     Alcohol use: Not Currently     Alcohol/week: 0.0 standard drinks     Drinks per session: 1 or 2     Binge frequency: Weekly     Comment: occ 1/week     Family History   Problem Relation Age of Onset     Musculoskeletal Disorder Mother         back     Anxiety Disorder Mother      Colon Polyps Mother      Ulcerative Colitis Mother         and ischemic small intestine, surgery     Hypertension Mother      Breast Cancer Mother      Osteoporosis Mother      Diabetes Mother         Type 2, Diagnosed in 2014     Depression Mother         Takes Cymbalta to help with chronic pain + depx     Thyroid Disease Mother         Hypothyroidism     Obesity Mother         Under much better control latter half of 2015     Musculoskeletal Disorder Father         back     Substance Abuse Father      Hypertension Father      Hyperlipidemia Father      Depression Father         Off meds for many years. Seems \"ok\"     Heart Disease Maternal Grandmother      Heart Disease Maternal Grandfather      Psychotic Disorder Paternal Grandfather      Suicide Paternal Grandfather      Depression Paternal Grandfather         Pediatrician. Committed suicide by pistol in 1990.     Musculoskeletal Disorder Brother         back     Depression Brother         Expressed as anger and moodiness     Substance Abuse " Brother      Substance Abuse Sister      Depression Sister         Mental Health Therapist, yet no anti-depressants?     Anxiety Disorder Sister         Mental Health Therapist, yet no anti-anxiety meds?     Other Cancer Other         Bladder Cancer - Fatal     Substance Abuse Brother      Colon Cancer No family hx of      Crohn's Disease No family hx of      Anesthesia Reaction No family hx of      Cancer No family hx of         No family history of skin cancer         Current Outpatient Medications   Medication Sig Dispense Refill     acetaminophen 500 MG CAPS Take 500 mg by mouth every 4 hours as needed 60 capsule      albuterol (PROAIR HFA/PROVENTIL HFA/VENTOLIN HFA) 108 (90 Base) MCG/ACT inhaler Inhale 2 puffs into the lungs every 4 hours as needed for shortness of breath / dyspnea or wheezing 8.5 g 11     albuterol (PROVENTIL) (2.5 MG/3ML) 0.083% neb solution Take 1 vial (2.5 mg) by nebulization every 6 hours as needed for shortness of breath / dyspnea or wheezing 200 mL 3     ALPRAZolam (XANAX) 0.5 MG tablet Take 1 tablet up to three times daily.       aspirin (ASA) 81 MG tablet Take 81 mg by mouth daily        cetirizine (ZYRTEC) 10 MG tablet Take 1 tablet (10 mg) by mouth At Bedtime 30 tablet 11     cholecalciferol (VITAMIN D3) 44771 units (1250 mcg) capsule Take one capsule every two weeks. 24 capsule 1     cyanocobalamin (CYANOCOBALAMIN) 1000 MCG/ML injection Inject 1 mL (1,000 mcg) into the muscle every 30 days (Patient taking differently: Inject 1 mL into the muscle every 30 days Taking this injection every 3 weeks per pt report) 10 mL 0     doxycycline hyclate (VIBRAMYCIN) 100 MG capsule Take 1 capsule (100 mg) by mouth 2 times daily for 7 days 14 capsule 0     DULoxetine (CYMBALTA) 60 MG capsule Take 60 mg by mouth 2 times daily        EPINEPHrine (EPIPEN/ADRENACLICK/OR ANY BX GENERIC EQUIV) 0.3 MG/0.3ML injection 2-pack Inject 0.3 mLs (0.3 mg) into the muscle once as needed for anaphylaxis 0.6 mL 3      etanercept (ENBREL SURECLICK) 50 MG/ML autoinjector Inject 50 mg Subcutaneous once a week Hold for signs of infection, and seek medical attention. 4 mL 5     famotidine (PEPCID) 20 MG tablet Prior to administration of Humira every 2 weeks (Patient taking differently: Patient is taking this 1 time weekly.)       fluticasone-salmeterol (ADVAIR) 500-50 MCG/DOSE inhaler Inhale 1 puff into the lungs every 12 hours 3 Inhaler 3     Ginger, Zingiber officinalis, (GINGER ROOT) 550 MG CAPS capsule Take 550 mg by mouth daily Up to three times daily        ketotifen (ZADITOR) 0.025 % ophthalmic solution 1 drop 2 times daily       lamoTRIgine (LAMICTAL) 100 MG tablet Take 200 mg by mouth daily       levothyroxine (SYNTHROID/LEVOTHROID) 75 MCG tablet TAKE 1 TABLET EVERY MORNING 90 tablet 1     lidocaine (XYLOCAINE) 5 % external ointment Apply topically 4 times daily as needed (pain) 50 g 2     Liniments (SALONPAS EX) Externally apply 1 Application topically daily as needed       melatonin 3 MG tablet Take 6 mg by mouth nightly as needed for sleep       metFORMIN (GLUCOPHAGE-XR) 500 MG 24 hr tablet Take 2 tablets (1,000 mg) by mouth daily (with dinner) 180 tablet 0     methylPREDNISolone (MEDROL DOSEPAK) 4 MG tablet therapy pack Follow Package Directions 21 tablet 0     metoclopramide (REGLAN) 5 MG tablet Take 1 tablet (5 mg) by mouth 3 times daily as needed (nausea) 90 tablet 1     metoprolol succinate ER (TOPROL-XL) 200 MG 24 hr tablet Take 1 tablet (200 mg) by mouth 2 times daily 180 tablet 0     montelukast (SINGULAIR) 10 MG tablet Take 1 tablet (10 mg) by mouth every evening 90 tablet 1     nabumetone (RELAFEN) 500 MG tablet Take 1-2 tablets (500-1,000 mg) by mouth 2 times daily (with meals) as needed for back/joint pain. 360 tablet 0     omega-3 acid ethyl esters (LOVAZA) 1 g capsule TAKE 2 CAPSULES BY MOUTH TWICE DAILY. 360 capsule 2     omeprazole 20 MG tablet Take 20 mg by mouth daily        order for DME Equipment  being ordered: Assure Compression stockings (ANNETTA) Large, closed toe, thigh-high 30-40 compression 2 Units 3     order for DME Jobst thigh high hose:  30-40 mmHg    Equipment being ordered: Jobst thigh high hose 30-40 mmHg 1 Units 1     order for DME Equipment being ordered: lumbosacral belt/brace 1 Units 0     order for DME Respironics REMSTAR 60 Series Auto CPAP 9-13 cm H2O, Wisp nasal mask w/a large cushion and a chinstrap       oxyCODONE (ROXICODONE) 5 MG tablet Take 1-2 tablets (5-10 mg) by mouth every 6 hours as needed for pain (maximum 4 tablet(s) per day) 35 tablet 0     pregabalin (LYRICA) 150 MG capsule Take 1 capsule (150 mg) by mouth 3 times daily 270 capsule 1     QUEtiapine (SEROQUEL) 25 MG tablet Take 25 mg by mouth 4 times daily as needed Taking 50 MG @ HS per pt report       ramipril (ALTACE) 10 MG capsule Take 1 capsule (10 mg) by mouth daily 90 capsule 1     rizatriptan (MAXALT-MLT) 5 MG ODT Take 1 tablet (5 mg) by mouth at onset of headache for migraine 30 tablet 5     rosuvastatin (CRESTOR) 40 MG tablet Take 1 tablet (40 mg) by mouth daily 90 tablet 0     syringe, disposable, (BD TUBERCULIN SYRINGE) 1 ML MISC Equipment being ordered: 1 ml tuberculin syringes to be used for Vitamin B12 injections. 12 each 11     vitamin C (ASCORBIC ACID) 500 MG tablet Take 500 mg by mouth daily       Allergies   Allergen Reactions     Amoxicillin-Pot Clavulanate Difficulty breathing     Banana Shortness Of Breath     Pt reports organic Banana is okay.      Nitroglycerin Palpitations     Penicillins Anaphylaxis     Provigil [Modafinil] Shortness Of Breath     headache     Gadolinium Hives and Itching     Patient was premedicated for the contrast allergy. He did still have a reaction a few hours after injection. Hives and itching. Dr. Gomez told tech to inform pt he should only have contrast again in the future when premedicated and at a hospital. Not at an outpatient facility.      Ketoconazole      Topical  cream caused swelling and itching     Dye [Contrast Dye] Other (See Comments) and Hives     Moderate flushing, CT contrast     Golimumab      Hives, bradycardia, face swelling     Neurontin [Gabapentin] Hives     Moderate hives     Nortriptyline Hives     Varicella Virus Vaccine Live      Rash     Flagyl [Metronidazole Hcl] Palpitations and Hives     Latex Rash     Metronidazole Palpitations, Other (See Comments) and Rash     dizziness (versus ciprofloxacin taken at same time)     No Clinical Screening - See Comments Rash     Nitrile gloves     Recent Labs   Lab Test 07/03/20  1251 01/24/20  1637 09/26/19  1010 09/13/19  0943  07/21/19  1954  03/19/19  0807  12/24/18  1114   A1C  --  6.0*  --  5.9*  --   --   --  6.1*  --  6.7*   LDL  --   --   --  Cannot estimate LDL when triglyceride exceeds 400 mg/dL  64  --   --   --  Cannot estimate LDL when triglyceride exceeds 400 mg/dL  53  --  Cannot estimate LDL when triglyceride exceeds 400 mg/dL   HDL  --   --   --  33*  --   --   --  28*  --  34*   TRIG  --   --   --  468*  --   --   --  583*  --  434*   ALT  --  61 53  --   --  49   < > 51   < >  --    CR 0.84 0.81 0.83  --    < > 0.84   < > 1.00   < >  --    GFRESTIMATED >90 >90 >90  --    < > >90   < > >90   < >  --    GFRESTBLACK >90 >90 >90  --    < > >90   < > >90   < >  --    POTASSIUM 3.9 4.4  --   --    < > 4.3   < > 4.3   < >  --    TSH  --  2.17  --   --   --   --   --   --   --  2.63    < > = values in this interval not displayed.      BP Readings from Last 3 Encounters:   06/23/20 (!) 132/92   06/04/20 121/78   05/22/20 130/83    Wt Readings from Last 3 Encounters:   06/23/20 141.1 kg (311 lb)   06/04/20 141.3 kg (311 lb 8 oz)   05/22/20 138.3 kg (305 lb)                    Reviewed and updated as needed this visit by Provider  Tobacco  Allergies  Meds  Problems  Med Hx  Surg Hx  Fam Hx         Review of Systems   Constitutional, HEENT, cardiovascular, pulmonary, gi and gu systems are negative,  except as otherwise noted.       Objective   Reported vitals:  There were no vitals taken for this visit.   healthy, alert and no distress  PSYCH: Alert and oriented times 3; coherent speech, normal   rate and volume, able to articulate logical thoughts, able   to abstract reason, no tangential thoughts, no hallucinations   or delusions  His affect is normal  RESP: No cough, no audible wheezing, able to talk in full sentences  Remainder of exam unable to be completed due to telephone visits    Diagnostic Test Results:  Labs reviewed in Epic  No results found. However, due to the size of the patient record, not all encounters were searched. Please check Results Review for a complete set of results.        Assessment/Plan:    1. Acute sinusitis with symptoms > 10 days  -Hydration and monitor temperature  -await results of imaging and Covid test  -ER precautions: increased cough, shortness of breath, fever over 102 F, emesis   - doxycycline hyclate (VIBRAMYCIN) 100 MG capsule; Take 1 capsule (100 mg) by mouth 2 times daily for 7 days  Dispense: 14 capsule; Refill: 0  - XR Chest 2 Views; Future  - XR Sinus Complete G/E 3 Views; Future  -Finish course of Medrol osorio    2. Type 2 diabetes mellitus with complication, without long-term current use of insulin (H)  -continue to monitor glucose as infections can cause hyperglycemia  -last HbA1C was 6 (1/2020)    3. Ankylosing spondylitis of sacral region (H)  -per Rheumatology  -has not been on Enbrel while sick       Return in about 3 days (around 7/25/2020), or if symptoms worsen or fail to improve.      Phone call duration:  7 minutes    Karrie Root MD MPH

## 2020-07-23 ENCOUNTER — ANCILLARY PROCEDURE (OUTPATIENT)
Dept: GENERAL RADIOLOGY | Facility: CLINIC | Age: 45
End: 2020-07-23
Attending: PREVENTIVE MEDICINE
Payer: MEDICARE

## 2020-07-23 DIAGNOSIS — J01.90 ACUTE SINUSITIS WITH SYMPTOMS > 10 DAYS: ICD-10-CM

## 2020-07-23 DIAGNOSIS — J45.30 MILD PERSISTENT ASTHMA, UNSPECIFIED WHETHER COMPLICATED: ICD-10-CM

## 2020-07-23 PROCEDURE — 71046 X-RAY EXAM CHEST 2 VIEWS: CPT

## 2020-07-23 PROCEDURE — 70220 X-RAY EXAM OF SINUSES: CPT

## 2020-07-23 RX ORDER — ALBUTEROL SULFATE 90 UG/1
2 AEROSOL, METERED RESPIRATORY (INHALATION) EVERY 4 HOURS PRN
Qty: 8.5 G | Refills: 11 | Status: SHIPPED | OUTPATIENT
Start: 2020-07-23 | End: 2021-06-09

## 2020-07-23 RX ORDER — OXYCODONE HYDROCHLORIDE 5 MG/1
5-10 TABLET ORAL EVERY 6 HOURS PRN
Qty: 35 TABLET | Refills: 0 | Status: SHIPPED | OUTPATIENT
Start: 2020-07-23 | End: 2020-08-10

## 2020-07-23 NOTE — TELEPHONE ENCOUNTER
Writer received a refill request from: [facility] in [place].     Medication:     albuterol (PROAIR HFA/PROVENTIL HFA/VENTOLIN HFA) 108 (90 Base) MCG/ACT inhaler  8.5 g  11  6/4/2019   No    Sig - Route: Inhale 2 puffs into the lungs every 4 hours as needed for shortness of breath / dyspnea or wheezing - Inhalation        Sig: Inhale 2 puffs into the lungs every 4 hours as needed for shortness of breath / dyspnea or wheezing - Inhalation       Date last written: 06/04/2019  Dispensed amount: 8.5 g  Refills: 11  Date last dispensed: 06/04/20109      Pt's last office visit:  06/11/2020  Next scheduled office visit: Unknown      Per the RN/LPN medication refill protocol, writer is unable to refill this request. If able to refill, please call the pt to inform them the RX was sent to the pharmacy. If unable to refill, route this encounter to the prescribing physician for authorization or further instructions.

## 2020-07-24 ENCOUNTER — MYC MEDICAL ADVICE (OUTPATIENT)
Dept: FAMILY MEDICINE | Facility: CLINIC | Age: 45
End: 2020-07-24

## 2020-07-24 NOTE — RESULT ENCOUNTER NOTE
Joel, your test results were within normal limits. Chest X ray did not show any pneumonias or fluid in the lungs.     Please do not hesitate to call us at [phone] if you have any questions or concerns.    Thank you,    Karrie Root MD MPH

## 2020-07-24 NOTE — RESULT ENCOUNTER NOTE
Joel,    X rays of the sinuses did not show any acute inflammation.     Please do not hesitate to call us at [phone] if you have any questions or concerns.    Thank you,    Karrie Root MD MPH

## 2020-07-26 DIAGNOSIS — R11.0 NAUSEA: ICD-10-CM

## 2020-07-28 ENCOUNTER — VIRTUAL VISIT (OUTPATIENT)
Dept: FAMILY MEDICINE | Facility: CLINIC | Age: 45
End: 2020-07-28
Payer: MEDICARE

## 2020-07-28 DIAGNOSIS — M45.8 ANKYLOSING SPONDYLITIS OF SACRAL REGION (H): ICD-10-CM

## 2020-07-28 DIAGNOSIS — E11.8 TYPE 2 DIABETES MELLITUS WITH COMPLICATION, WITHOUT LONG-TERM CURRENT USE OF INSULIN (H): ICD-10-CM

## 2020-07-28 DIAGNOSIS — B02.8 HERPES ZOSTER WITH COMPLICATION: Primary | ICD-10-CM

## 2020-07-28 PROCEDURE — 99441 ZZC PHYSICIAN TELEPHONE EVALUATION 5-10 MIN: CPT | Performed by: PREVENTIVE MEDICINE

## 2020-07-28 ASSESSMENT — PAIN SCALES - GENERAL: PAINLEVEL: MODERATE PAIN (4)

## 2020-07-28 NOTE — PATIENT INSTRUCTIONS
At [facility], we strive to deliver an exceptional experience to you, every time we see you. If you receive a survey, please complete it as we do value your feedback.  If you have MyChart, you can expect to receive results automatically within 24 hours of their completion.  Your provider will send a note interpreting your results as well.   If you do not have MyChart, you should receive your results in about a week by mail.    Your care team:                            Family Medicine Internal Medicine   MD Houston Frances MD Shantel Branch-Fleming, MD Srinivasa Vaka, MD Katya Georgiev PA-C Megan Hill, APRN CNP    Javier Cavazos, MD Pediatrics   Klever Obando, PALeydaC  Magdalene Scherer, CNP MD Radha Victor APRN CNP   MD Humaira Devlin MD Deborah Mielke, MD Diana Kauffman, APRN CNP  Faith Shanks, PALeydaC  Jing Priest, CNP  MD Felipa Narayan MD Angela Wermerskirchen, MD      Clinic hours: Monday - Thursday 7 am-7 pm; Fridays 7 am-5 pm.   Urgent care: Monday - Friday 11 am-9 pm; Saturday and Sunday 9 am-5 pm.    Clinic: [phone]       [facility]: Monday - Thursday 8 am - 7 pm; Friday 8 am - 6 pm  [facility]: [phone]     Use www.[email or web address] for 24/7 diagnosis and treatment of dozens of conditions.

## 2020-07-28 NOTE — PROGRESS NOTES
"Joel Pineda is a 45 year old male who is being evaluated via a billable telephone visit.      The patient has been notified of following:     \"This telephone visit will be conducted via a call between you and your physician/provider. We have found that certain health care needs can be provided without the need for a physical exam.  This service lets us provide the care you need with a short phone conversation.  If a prescription is necessary we can send it directly to your pharmacy.  If lab work is needed we can place an order for that and you can then stop by our lab to have the test done at a later time.    Telephone visits are billed at different rates depending on your insurance coverage. During this emergency period, for some insurers they may be billed the same as an in-person visit.  Please reach out to your insurance provider with any questions.    If during the course of the call the physician/provider feels a telephone visit is not appropriate, you will not be charged for this service.\"    Patient has given verbal consent for Telephone visit?  Yes    What phone number would you like to be contacted at? [phone]    How would you like to obtain your AVS? Kal Garcia     Joel Pineda is a 45 year old male who presents via phone visit today for the following health issues:    HPI      Concern - Scalp     When did it start?: Mid June    Any strain/injury, other illness, or event to trigger this problem?   YES- Shingles    Previous history of similar problem:   YES      Location:           Scalp    Describe it:   Pain on scalp    Severity: 4/10      Progression of symptoms from onset until now:  improving      Accompanying Signs & Symptoms:  Pain from shingles   What have you noticed that tends to make this worse?     None      What have you noticed that tends to make this better?     None      What have you done (this time) to try to treat this problem yourself?     None      Did it help?     " no         Does not meet criteria for Post herpetic neuralgia as symptoms are less than 4 months in duration.  Pain in the scalp from shingles  Has had shingles before  Feels like an electric shock  Has topical Lidocaine and Oxycodone for pain (for arthritis)  Has not used Capsaicin over the counter but will start using   Has been on Lyrica 150 mg three times a day  No new lesions     Patient Active Problem List   Diagnosis     Major depressive disorder, recurrent episode (H)     Intermittent asthma     Hyperlipidemia LDL goal <100     Chronic nonallergic rhinitis     Diverticulosis     GERD (gastroesophageal reflux disease)     Anxiety     LLOYD (obstructive sleep apnea)- mild (AHI 11)     Intracranial arachnoid cyst     Facet arthritis of cervical region     Acquired hypothyroidism     Bipolar 2 disorder (H)     Chronic midline low back pain without sciatica     Irritable bowel syndrome with diarrhea     B12 deficiency     Essential hypertension with goal blood pressure less than 140/90     Chronic pain syndrome     Ankylosing spondylitis of sacral region (H)     Morbid obesity due to hypertriglyceridemia (H)     Fatty infiltration of liver     DDD (degenerative disc disease), lumbar     Type 2 diabetes mellitus with complication, without long-term current use of insulin (H)     Peripheral polyneuropathy     History of pulmonary embolism     Ingrown toenail     Lipoma of skin and subcutaneous tissue     Hypertriglyceridemia     Gastroparesis     Orthostatic dizziness     POTS (postural orthostatic tachycardia syndrome)     Past Surgical History:   Procedure Laterality Date     BACK SURGERY  10/07    lumbar discectomy L5-S1     COLONOSCOPY      Note: colonoscopy scheduled [facility] on Friday, 9/4/15     COSMETIC SURGERY  2012    Nose Exterior - functional     GI SURGERY  August 2013    Sigmoidectomy     HERNIA REPAIR, UMBILICAL  8/23/11    Dr. Evan whiting     INCISION AND DRAINAGE, ABSCESS, COMPLEX  8/23/11  "   umbilical, Dr. Evan Beavers     LAPAROSCOPIC ASSISTED COLECTOMY LEFT (DESCENDING)  8/15/2013    Procedure: LAPAROSCOPIC ASSISTED COLECTOMY LEFT (DESCENDING);  Laparoscopic Hand Assisted Sigmoid Resection, Mobilization of Splenic Fissure, coloproctoscopy, *Latex Free Room* Anesthesia General with Pain block  ;  Surgeon: Aurora Justice MD;  Location: [facility] OR     NERVE SURGERY  8/18/11    RF ablation @ L3-S1 @ [facility]     RECONSTRUCT NOSE AND SEPTUM (FUNCTIONAL)  10/14/2011    Procedure:RECONSTRUCT NOSE AND SEPTUM (FUNCTIONAL); Functional Septorhinoplasty, Turbinate Reduction, ; Surgeon:CEDRIC CUEVAS; Location:[facility] OR     SINUS SURGERY  10/1/01    ethmoidectomy chronic sinusitis       Social History     Tobacco Use     Smoking status: Never Smoker     Smokeless tobacco: Never Used   Substance Use Topics     Alcohol use: Not Currently     Alcohol/week: 0.0 standard drinks     Drinks per session: 1 or 2     Binge frequency: Weekly     Comment: occ 1/week     Family History   Problem Relation Age of Onset     Musculoskeletal Disorder Mother         back     Anxiety Disorder Mother      Colon Polyps Mother      Ulcerative Colitis Mother         and ischemic small intestine, surgery     Hypertension Mother      Breast Cancer Mother      Osteoporosis Mother      Diabetes Mother         Type 2, Diagnosed in 2014     Depression Mother         Takes Cymbalta to help with chronic pain + depx     Thyroid Disease Mother         Hypothyroidism     Obesity Mother         Under much better control latter half of 2015     Musculoskeletal Disorder Father         back     Substance Abuse Father      Hypertension Father      Hyperlipidemia Father      Depression Father         Off meds for many years. Seems \"ok\"     Heart Disease Maternal Grandmother      Heart Disease Maternal Grandfather      Psychotic Disorder Paternal Grandfather      Suicide Paternal Grandfather      Depression Paternal Grandfather         Pediatrician. Committed " suicide by pistol in 1990.     Musculoskeletal Disorder Brother         back     Depression Brother         Expressed as anger and moodiness     Substance Abuse Brother      Substance Abuse Sister      Depression Sister         Mental Health Therapist, yet no anti-depressants?     Anxiety Disorder Sister         Mental Health Therapist, yet no anti-anxiety meds?     Other Cancer Other         Bladder Cancer - Fatal     Substance Abuse Brother      Colon Cancer No family hx of      Crohn's Disease No family hx of      Anesthesia Reaction No family hx of      Cancer No family hx of         No family history of skin cancer         Current Outpatient Medications   Medication Sig Dispense Refill     acetaminophen 500 MG CAPS Take 500 mg by mouth every 4 hours as needed 60 capsule      albuterol (PROAIR HFA/PROVENTIL HFA/VENTOLIN HFA) 108 (90 Base) MCG/ACT inhaler Inhale 2 puffs into the lungs every 4 hours as needed for shortness of breath / dyspnea or wheezing 8.5 g 11     albuterol (PROVENTIL) (2.5 MG/3ML) 0.083% neb solution Take 1 vial (2.5 mg) by nebulization every 6 hours as needed for shortness of breath / dyspnea or wheezing 200 mL 3     ALPRAZolam (XANAX) 0.5 MG tablet Take 1 tablet up to three times daily.       aspirin (ASA) 81 MG tablet Take 81 mg by mouth daily        cetirizine (ZYRTEC) 10 MG tablet Take 1 tablet (10 mg) by mouth At Bedtime 30 tablet 11     cholecalciferol (VITAMIN D3) 67700 units (1250 mcg) capsule Take one capsule every two weeks. 24 capsule 1     cyanocobalamin (CYANOCOBALAMIN) 1000 MCG/ML injection Inject 1 mL (1,000 mcg) into the muscle every 30 days (Patient taking differently: Inject 1 mL into the muscle every 30 days Taking this injection every 3 weeks per pt report) 10 mL 0     doxycycline hyclate (VIBRAMYCIN) 100 MG capsule Take 1 capsule (100 mg) by mouth 2 times daily for 7 days 14 capsule 0     DULoxetine (CYMBALTA) 60 MG capsule Take 60 mg by mouth 2 times daily         EPINEPHrine (EPIPEN/ADRENACLICK/OR ANY BX GENERIC EQUIV) 0.3 MG/0.3ML injection 2-pack Inject 0.3 mLs (0.3 mg) into the muscle once as needed for anaphylaxis 0.6 mL 3     etanercept (ENBREL SURECLICK) 50 MG/ML autoinjector Inject 50 mg Subcutaneous once a week Hold for signs of infection, and seek medical attention. 4 mL 5     famotidine (PEPCID) 20 MG tablet Prior to administration of Humira every 2 weeks (Patient taking differently: Patient is taking this 1 time weekly.)       fluticasone-salmeterol (ADVAIR) 500-50 MCG/DOSE inhaler Inhale 1 puff into the lungs every 12 hours 3 Inhaler 3     lamoTRIgine (LAMICTAL) 100 MG tablet Take 200 mg by mouth daily       levothyroxine (SYNTHROID/LEVOTHROID) 75 MCG tablet TAKE 1 TABLET EVERY MORNING 90 tablet 1     lidocaine (XYLOCAINE) 5 % external ointment Apply topically 4 times daily as needed (pain) 50 g 2     Liniments (SALONPAS EX) Externally apply 1 Application topically daily as needed       melatonin 3 MG tablet Take 6 mg by mouth nightly as needed for sleep       metFORMIN (GLUCOPHAGE-XR) 500 MG 24 hr tablet Take 2 tablets (1,000 mg) by mouth daily (with dinner) 180 tablet 0     metoclopramide (REGLAN) 5 MG tablet Take 1 tablet (5 mg) by mouth 3 times daily as needed (nausea) 90 tablet 1     metoprolol succinate ER (TOPROL-XL) 200 MG 24 hr tablet Take 1 tablet (200 mg) by mouth 2 times daily 180 tablet 0     montelukast (SINGULAIR) 10 MG tablet Take 1 tablet (10 mg) by mouth every evening 90 tablet 1     nabumetone (RELAFEN) 500 MG tablet Take 1-2 tablets (500-1,000 mg) by mouth 2 times daily (with meals) as needed for back/joint pain. 360 tablet 0     omega-3 acid ethyl esters (LOVAZA) 1 g capsule TAKE 2 CAPSULES BY MOUTH TWICE DAILY. 360 capsule 2     omeprazole 20 MG tablet Take 20 mg by mouth daily        order for DME Equipment being ordered: Assure Compression stockings (ANNETTA) Large, closed toe, thigh-high 30-40 compression 2 Units 3     order for DME Jobst  thigh high hose:  30-40 mmHg    Equipment being ordered: Jobst thigh high hose 30-40 mmHg 1 Units 1     order for DME Equipment being ordered: lumbosacral belt/brace 1 Units 0     order for DME Respironics REMSTAR 60 Series Auto CPAP 9-13 cm H2O, Wisp nasal mask w/a large cushion and a chinstrap       oxyCODONE (ROXICODONE) 5 MG tablet Take 1-2 tablets (5-10 mg) by mouth every 6 hours as needed for pain (maximum 4 tablet(s) per day) 35 tablet 0     QUEtiapine (SEROQUEL) 25 MG tablet Take 25 mg by mouth 4 times daily as needed Taking 50 MG @ HS per pt report       ramipril (ALTACE) 10 MG capsule Take 1 capsule (10 mg) by mouth daily 90 capsule 1     rizatriptan (MAXALT-MLT) 5 MG ODT Take 1 tablet (5 mg) by mouth at onset of headache for migraine 30 tablet 5     rosuvastatin (CRESTOR) 40 MG tablet Take 1 tablet (40 mg) by mouth daily 90 tablet 0     syringe, disposable, (BD TUBERCULIN SYRINGE) 1 ML MISC Equipment being ordered: 1 ml tuberculin syringes to be used for Vitamin B12 injections. 12 each 11     vitamin C (ASCORBIC ACID) 500 MG tablet Take 500 mg by mouth daily       pregabalin (LYRICA) 150 MG capsule Take 1 capsule (150 mg) by mouth 3 times daily 270 capsule 1     Allergies   Allergen Reactions     Amoxicillin-Pot Clavulanate Difficulty breathing     Banana Shortness Of Breath     Pt reports organic Banana is okay.      Nitroglycerin Palpitations     Penicillins Anaphylaxis     Provigil [Modafinil] Shortness Of Breath     headache     Gadolinium Hives and Itching     Patient was premedicated for the contrast allergy. He did still have a reaction a few hours after injection. Hives and itching. Dr. Gomez told tech to inform pt he should only have contrast again in the future when premedicated and at a hospital. Not at an outpatient facility.      Ketoconazole      Topical cream caused swelling and itching     Dye [Contrast Dye] Other (See Comments) and Hives     Moderate flushing, CT contrast      Golimumab      Hives, bradycardia, face swelling     Neurontin [Gabapentin] Hives     Moderate hives     Nortriptyline Hives     Varicella Virus Vaccine Live      Rash     Flagyl [Metronidazole Hcl] Palpitations and Hives     Latex Rash     Metronidazole Palpitations, Other (See Comments) and Rash     dizziness (versus ciprofloxacin taken at same time)     No Clinical Screening - See Comments Rash     Nitrile gloves     Recent Labs   Lab Test 07/03/20  1251 01/24/20  1637 09/26/19  1010 09/13/19  0943  07/21/19  1954  03/19/19  0807  12/24/18  1114   A1C  --  6.0*  --  5.9*  --   --   --  6.1*  --  6.7*   LDL  --   --   --  Cannot estimate LDL when triglyceride exceeds 400 mg/dL  64  --   --   --  Cannot estimate LDL when triglyceride exceeds 400 mg/dL  53  --  Cannot estimate LDL when triglyceride exceeds 400 mg/dL   HDL  --   --   --  33*  --   --   --  28*  --  34*   TRIG  --   --   --  468*  --   --   --  583*  --  434*   ALT  --  61 53  --   --  49   < > 51   < >  --    CR 0.84 0.81 0.83  --    < > 0.84   < > 1.00   < >  --    GFRESTIMATED >90 >90 >90  --    < > >90   < > >90   < >  --    GFRESTBLACK >90 >90 >90  --    < > >90   < > >90   < >  --    POTASSIUM 3.9 4.4  --   --    < > 4.3   < > 4.3   < >  --    TSH  --  2.17  --   --   --   --   --   --   --  2.63    < > = values in this interval not displayed.      BP Readings from Last 3 Encounters:   06/23/20 (!) 132/92   06/04/20 121/78   05/22/20 130/83    Wt Readings from Last 3 Encounters:   06/23/20 141.1 kg (311 lb)   06/04/20 141.3 kg (311 lb 8 oz)   05/22/20 138.3 kg (305 lb)                    Reviewed and updated as needed this visit by Provider  Tobacco  Allergies  Meds  Problems  Med Hx  Surg Hx  Fam Hx         Review of Systems   Constitutional, HEENT, cardiovascular, pulmonary, gi and gu systems are negative, except as otherwise noted.       Objective   Reported vitals:  There were no vitals taken for this visit.   healthy, alert and no  distress  PSYCH: Alert and oriented times 3; coherent speech, normal   rate and volume, able to articulate logical thoughts, able   to abstract reason, no tangential thoughts, no hallucinations   or delusions  His affect is normal  RESP: No cough, no audible wheezing, able to talk in full sentences  Remainder of exam unable to be completed due to telephone visits    Diagnostic Test Results:  Labs reviewed in Epic  No results found. However, due to the size of the patient record, not all encounters were searched. Please check Results Review for a complete set of results.        Assessment/Plan:    1. Herpes zoster with complication  -referral to Pain Specialist to discuss further options  - PAIN MANAGEMENT REFERRAL  -Has topical Lidocaine and Oxycodone for pain (for arthritis)  -Has not used Capsaicin over the counter but will start using   -Has been on Lyrica 150 mg three times a day    2. Type 2 diabetes mellitus with complication, without long-term current use of insulin (H)  -well controlled  -last HbA1C was 6 (1/2020)     3. Ankylosing spondylitis of sacral region (H)  -On Oxycodone       Return in about 2 weeks (around 8/11/2020), or if symptoms worsen or fail to improve.      Phone call duration:  7 minutes    Karrie Root MD MPH

## 2020-07-29 ENCOUNTER — TELEPHONE (OUTPATIENT)
Dept: PALLIATIVE MEDICINE | Facility: CLINIC | Age: 45
End: 2020-07-29

## 2020-07-29 RX ORDER — METOCLOPRAMIDE 5 MG/1
TABLET ORAL
Qty: 90 TABLET | Refills: 0 | Status: SHIPPED | OUTPATIENT
Start: 2020-07-29 | End: 2020-08-23

## 2020-07-29 NOTE — LETTER
July 29, 2020    Joel Pineda  [id number] [place] [address]    Dear Joel                                                                 Welcome to the [facility].  We are located on the 2nd floor ([address]) of the [facility], located at [address] [place] [address] [place] [address].    Your appointment at the [facility] has been scheduled on August 10th at 1:00 PM with Stacia Jakc NP. This will be a video visit, so you DO NOT need to come to the clinic.    At your first visit, you will meet your team of caregivers who will help you to develop pain management strategies that will last a lifetime. You will meet with our support staff to review your insurance information, and collect your co-payment if required by your insurance company. You will also meet with a medical pain specialist and care coordinator who will assess your pain and develop a plan of care for your successful pain rehabilitation. You should expect to spend approximately 1 hour at your first visit with us. Usually, patients work with us for a period of 6-12 months, and eventually return to their primary doctor once their pain management has stabilized.      To help us make your visit go as smoothly as possible, please mail back the following items with you on your visit:       Completed Pain Questionnaire enclosed in this packet.  If you do not mail back the completed questionnaire, we may have to reschedule your appointment.      Due to the demand for new patient evaluations, you must notify the scheduling department 48 hours (2 days) in advance if you are not able to keep this appointment. Failure to do so could affect your ability to reschedule with our clinic. Please be aware that we will not prescribe any medications at your first visit.     Please call [phone] with any questions regarding your appointment. We look forward to meeting you and  working to address your health care needs.     Sincerely,      [facility] [place]

## 2020-07-29 NOTE — TELEPHONE ENCOUNTER
Prescription approved per [facility] Refill Protocol.    Martell Chaves RN, BSN, PHN

## 2020-07-29 NOTE — TELEPHONE ENCOUNTER
Sending for Review        Order Information       Authorizing Provider  Encounter Provider    Karrie Root MD Malik, Shaista, MD    ABN Associated with this Order     There is no ABN associated with this order.                   Ordering Provider's NPI: [id number]  Karrie Root    PAIN MANAGEMENT REFERRAL [[id number]]     Electronically signed by: Karrie Root MD on 07/28/20 1632  Status: Active    Ordering user: Karrie Root MD 07/28/20 1632    Order History   Outpatient   Date/Time  Action Taken  User  Additional Information    07/28/20 1632  Sign  Karrie Root MD     Comments     Your provider has referred you to: [facility]: [facility] [place] -     Reason for Referral: Consult-only- patient seen for one-time evaluation to provide recommendations back to referring provider.       Please complete the following questions:     Do you have any specific questions for the pain specialist? Yes: Pain management for shingles, already on Lyrica 450 mg daily, Topical Lidocaine, Oxycodone, topical Capsaicin     Are there any red flags that may impact the assessment or management of the patient? None       What is your diagnosis for the patient's pain? Herpes Zoster. Symptoms less than 4 months (hence defer diagnosis of post herpetic neuralgia)        Nguyen GUERRIER    [employer]

## 2020-07-29 NOTE — TELEPHONE ENCOUNTER
Ok to schedule for video visit- he has seen me in the past.      Ericka Diaz MD  [facility]

## 2020-07-29 NOTE — TELEPHONE ENCOUNTER

## 2020-07-30 ENCOUNTER — VIRTUAL VISIT (OUTPATIENT)
Dept: RHEUMATOLOGY | Facility: CLINIC | Age: 45
End: 2020-07-30
Payer: MEDICARE

## 2020-07-30 ENCOUNTER — TRANSFERRED RECORDS (OUTPATIENT)
Dept: HEALTH INFORMATION MANAGEMENT | Facility: CLINIC | Age: 45
End: 2020-07-30

## 2020-07-30 DIAGNOSIS — M45.9 ANKYLOSING SPONDYLITIS, UNSPECIFIED SITE OF SPINE (H): ICD-10-CM

## 2020-07-30 PROCEDURE — 99213 OFFICE O/P EST LOW 20 MIN: CPT | Mod: 95 | Performed by: INTERNAL MEDICINE

## 2020-07-30 RX ORDER — MEDROXYPROGESTERONE ACETATE 150 MG/ML
50 INJECTION, SUSPENSION INTRAMUSCULAR WEEKLY
Qty: 4 ML | Refills: 5 | Status: SHIPPED | OUTPATIENT
Start: 2020-07-30 | End: 2020-11-20

## 2020-07-30 NOTE — PROGRESS NOTES
"Joel Pineda is a 45 year old male who is being evaluated via a billable video visit.      The patient has been notified of following:     \"This video visit will be conducted via a call between you and your physician/provider. We have found that certain health care needs can be provided without the need for an in-person physical exam.  This service lets us provide the care you need with a video conversation.  If a prescription is necessary we can send it directly to your pharmacy.  If lab work is needed we can place an order for that and you can then stop by our lab to have the test done at a later time.    Video visits are billed at different rates depending on your insurance coverage.  Please reach out to your insurance provider with any questions.    If during the course of the call the physician/provider feels a video visit is not appropriate, you will not be charged for this service.\"    Patient has given verbal consent for Video visit? Yes  How would you like to obtain your AVS? MyChart  If you are dropped from the video visit, the video invite should be resent to: Text to cell phone: [phone]  Will anyone else be joining your video visit? No      Rheumatology Video Visit      Joel Pineda MRN# [id number]   YOB: 1975 Age: 45 year old      Date of visit: 7/30/20   PCP: Dr. Ksenia Lyles    Chief Complaint   Patient presents with:  Ankylosing spondylitis    Assessment and Plan     1.  Ankylosing spondylitis: Many years of inflammatory back pain but no clear sacroiliitis seen on x-rays or MRIs; then had a lumbar spine x-ray on 2/23/2018 at [facility] showing \"Moderate L5-S1 and mild L4-5 degenerative disc disease and degenerative facet arthropathy. No evidence for fracture, subluxation, or spondylolisthesis. Chronic bridging enthesophyte across the lower right SI joint with irregularity of the joint space suspicious for sequelae of chronic inflammatory sacroiliitis. Correlate clinically.\"  This " is complicated by a history of back surgery and degenerative changes.  HLA-B27 positive per record review but the actual lab report has not been seen.  He has a personal history of diverticulitis requiring sigmoidectomy.  Reportedly his mother has ulcerative colitis. Based on his symptoms, the x-ray results, and HLA-B27 positive, it is most likely ankylosing spondylitis and Remicade was started with improvement of lower back pain and resolution of lower back stiffness. However, Remicade was also associated with increased infections so it was changed to Simponi; Simponi was associated with hives, nausea, dizziness, and drowsiness, Humira (effective but associated with a sensation of burning on his tongue, and longstanding nausea that didn't seem related to me but did to Mr. Pineda). Has responded to TNF inhibitors so Enbrel was used and was doing well on it; effective for his inflammatory back symptoms. Holding now due to respiratory infection; last abx today he says.   - when 3 weeks out from infection and anti-infective use: restart Enbrel 50mg SQ every 7 days    2. Mechanical back pain: seeing NSGY.      3. Obesity; reported hx of fatty liver disease: encouraged weight loss and discussed the health benefit    4. Diarrhea: seeing [facility] and reportedly planning to have EGD and colonoscopy    5. Neck pain: improving with PT    # Relevant labs and imaging were reviewed with the patient    # High risk medication toxicity monitoring: discussion and labs reviewed; appropriate labs ordered. See above.  Instructed that if confirmed to have COVID-19 or exposure to someone with confirmed COVID-19 to call this clinic for directions on DMARD management.    # Note that this is a virtual visit to reduce the risk of COVID-19 exposure during this current pandemic.      # Considered to be at high risk of complications from the COVID-19 virus.  It is recommended to limit contact with other people and if possible to work remotely or  provide a leave of absence to reduce the risk for COVID-19.      Mr. Pineda verbalized agreement with and understanding of the rational for the diagnosis and treatment plan.  All questions were answered to best of my ability and the patient's satisfaction. Mr. Pineda was advised to contact the clinic with any questions that may arise after the clinic visit.      Thank you for involving me in the care of the patient    Return to clinic: 3-4 months      HPI   Joel Pineda is a 45 year old male with a past medical history significant for hypertension, hyperlipidemia, asthma, history of pulmonary embolism, history of diverticulitis requiring sigmoidectomy, GERD, obstructive sleep apnea, bipolar disorder, hypothyroidism, B12 deficiency, CKD? (reportedly issue with contrast) and chronic pain syndrome who presents for follow-up of ankylosing spondylitis.    Today, 7/30/2020: seen by [facility] today; concern for microscopic colitis; has been having diarrhea.  Respiratory issues for a while so hasn't used Enbrel for a while; twice in 2 months now.  Plans to restart Enbrel after being 3 weeks of infection and abx free.  Morning stiffness for about 60 in. Social distancing due to COVID19 pandemic.      Denies fevers, chills, nausea, vomiting, constipation. No chest pain/pressure, palpitations, or shortness of breath. No LE swelling. No neck pain. No oral or nasal sores.  No rash. No sicca symptoms.     Mother: ulcerative colitis    Tobacco: None  EtOH: None  Drugs: None    ROS   GEN: No fevers, chills, night sweats, or weight change  SKIN: No itching, rashes, sores  HEENT: No oral or nasal ulcers.  CV: No chest pain, pressure, palpitations, or dyspnea on exertion.  PULM: No SOB, wheeze, cough.  GI: See HPI  : No blood in urine.  MSK: See HPI.  NEURO: See HPI  EXT: No LE swelling  PSYCH: See HPI    Active Problem List     Patient Active Problem List   Diagnosis     Major depressive disorder, recurrent episode (H)     Intermittent  asthma     Hyperlipidemia LDL goal <100     Chronic nonallergic rhinitis     Diverticulosis     GERD (gastroesophageal reflux disease)     Anxiety     LLOYD (obstructive sleep apnea)- mild (AHI 11)     Intracranial arachnoid cyst     Facet arthritis of cervical region     Acquired hypothyroidism     Bipolar 2 disorder (H)     Chronic midline low back pain without sciatica     Irritable bowel syndrome with diarrhea     B12 deficiency     Essential hypertension with goal blood pressure less than 140/90     Chronic pain syndrome     Ankylosing spondylitis of sacral region (H)     Morbid obesity due to hypertriglyceridemia (H)     Fatty infiltration of liver     DDD (degenerative disc disease), lumbar     Type 2 diabetes mellitus with complication, without long-term current use of insulin (H)     Peripheral polyneuropathy     History of pulmonary embolism     Ingrown toenail     Lipoma of skin and subcutaneous tissue     Hypertriglyceridemia     Gastroparesis     Orthostatic dizziness     POTS (postural orthostatic tachycardia syndrome)     Past Medical History     Past Medical History:   Diagnosis Date     Acne      Acquired hypothyroidism      Allergic state      Ankylosing spondylitis lumbar region (H)      Ankylosing spondylitis of sacral region (H)      Anxiety      Bipolar 2 disorder (H)      Chest pain     Chest pain, regulated w/BP meds. Clear arteries.     Chronic pain      DDD (degenerative disc disease), lumbar      Depressive disorder      Diabetes (H)      Diverticulosis      Facet arthritis of cervical region      Gastroesophageal reflux disease      Hypertension      IBS (irritable bowel syndrome)      Intracranial arachnoid cyst      Polyneuropathy      Pulmonary embolism (H)      Skin exam, screening for cancer 12/3/2013     Sleep apnea      Uncomplicated asthma      Past Surgical History     Past Surgical History:   Procedure Laterality Date     BACK SURGERY  10/07    lumbar discectomy L5-S1      COLONOSCOPY      Note: colonoscopy scheduled with [facility] on Friday, 9/4/15     COSMETIC SURGERY  2012    Nose Exterior - functional     GI SURGERY  August 2013    Sigmoidectomy     HERNIA REPAIR, UMBILICAL  8/23/11    [place], Dr. Evan Beavers     INCISION AND DRAINAGE, ABSCESS, COMPLEX  8/23/11    umbilical, Dr. Evan Beavers     LAPAROSCOPIC ASSISTED COLECTOMY LEFT (DESCENDING)  8/15/2013    Procedure: LAPAROSCOPIC ASSISTED COLECTOMY LEFT (DESCENDING);  Laparoscopic Hand Assisted Sigmoid Resection, Mobilization of Splenic Fissure, coloproctoscopy, *Latex Free Room* Anesthesia General with Pain block  ;  Surgeon: Aurora Justice MD;  Location: [facility] OR     NERVE SURGERY  8/18/11    RF ablation @ L3-S1 @ [facility]     RECONSTRUCT NOSE AND SEPTUM (FUNCTIONAL)  10/14/2011    Procedure:RECONSTRUCT NOSE AND SEPTUM (FUNCTIONAL); Functional Septorhinoplasty, Turbinate Reduction, ; Surgeon:CEDRIC CUEVAS; Location:[facility] OR     SINUS SURGERY  10/1/01    ethmoidectomy chronic sinusitis     Allergy     Allergies   Allergen Reactions     Amoxicillin-Pot Clavulanate Difficulty breathing     Banana Shortness Of Breath     Pt reports organic Banana is okay.      Nitroglycerin Palpitations     Penicillins Anaphylaxis     Provigil [Modafinil] Shortness Of Breath     headache     Gadolinium Hives and Itching     Patient was premedicated for the contrast allergy. He did still have a reaction a few hours after injection. Hives and itching. Dr. Gomez told tech to inform pt he should only have contrast again in the future when premedicated and at a hospital. Not at an outpatient facility.      Ketoconazole      Topical cream caused swelling and itching     Dye [Contrast Dye] Other (See Comments) and Hives     Moderate flushing, CT contrast     Golimumab      Hives, bradycardia, face swelling     Neurontin [Gabapentin] Hives     Moderate hives     Nortriptyline Hives     Varicella Virus Vaccine Live      Rash     Flagyl [Metronidazole Hcl]  Palpitations and Hives     Latex Rash     Metronidazole Palpitations, Other (See Comments) and Rash     dizziness (versus ciprofloxacin taken at same time)     No Clinical Screening - See Comments Rash     Nitrile gloves     Current Medication List     Current Outpatient Medications   Medication Sig     acetaminophen 500 MG CAPS Take 500 mg by mouth every 4 hours as needed     albuterol (PROAIR HFA/PROVENTIL HFA/VENTOLIN HFA) 108 (90 Base) MCG/ACT inhaler Inhale 2 puffs into the lungs every 4 hours as needed for shortness of breath / dyspnea or wheezing     albuterol (PROVENTIL) (2.5 MG/3ML) 0.083% neb solution Take 1 vial (2.5 mg) by nebulization every 6 hours as needed for shortness of breath / dyspnea or wheezing     ALPRAZolam (XANAX) 0.5 MG tablet Take 1 tablet up to three times daily.     aspirin (ASA) 81 MG tablet Take 81 mg by mouth daily      cetirizine (ZYRTEC) 10 MG tablet Take 1 tablet (10 mg) by mouth At Bedtime     cholecalciferol (VITAMIN D3) 70249 units (1250 mcg) capsule Take one capsule every two weeks.     cyanocobalamin (CYANOCOBALAMIN) 1000 MCG/ML injection Inject 1 mL (1,000 mcg) into the muscle every 30 days (Patient taking differently: Inject 1 mL into the muscle every 30 days Taking this injection every 3 weeks per pt report)     DULoxetine (CYMBALTA) 60 MG capsule Take 60 mg by mouth 2 times daily      EPINEPHrine (EPIPEN/ADRENACLICK/OR ANY BX GENERIC EQUIV) 0.3 MG/0.3ML injection 2-pack Inject 0.3 mLs (0.3 mg) into the muscle once as needed for anaphylaxis     etanercept (ENBREL SURECLICK) 50 MG/ML autoinjector Inject 50 mg Subcutaneous once a week Hold for signs of infection, and seek medical attention.     famotidine (PEPCID) 20 MG tablet Prior to administration of Humira every 2 weeks (Patient taking differently: Patient is taking this 1 time weekly.)     fluticasone-salmeterol (ADVAIR) 500-50 MCG/DOSE inhaler Inhale 1 puff into the lungs every 12 hours     lamoTRIgine (LAMICTAL) 100 MG  tablet Take 200 mg by mouth daily     levothyroxine (SYNTHROID/LEVOTHROID) 75 MCG tablet TAKE 1 TABLET EVERY MORNING     lidocaine (XYLOCAINE) 5 % external ointment Apply topically 4 times daily as needed (pain)     Liniments (SALONPAS EX) Externally apply 1 Application topically daily as needed     melatonin 3 MG tablet Take 6 mg by mouth nightly as needed for sleep     metFORMIN (GLUCOPHAGE-XR) 500 MG 24 hr tablet Take 2 tablets (1,000 mg) by mouth daily (with dinner)     metoclopramide (REGLAN) 5 MG tablet TAKE ONE TABLET BY MOUTH THREE TIMES DAILY AS NEEDED FOR NAUSEA      metoprolol succinate ER (TOPROL-XL) 200 MG 24 hr tablet Take 1 tablet (200 mg) by mouth 2 times daily     montelukast (SINGULAIR) 10 MG tablet Take 1 tablet (10 mg) by mouth every evening     nabumetone (RELAFEN) 500 MG tablet Take 1-2 tablets (500-1,000 mg) by mouth 2 times daily (with meals) as needed for back/joint pain.     omega-3 acid ethyl esters (LOVAZA) 1 g capsule TAKE 2 CAPSULES BY MOUTH TWICE DAILY.     omeprazole 20 MG tablet Take 20 mg by mouth daily      oxyCODONE (ROXICODONE) 5 MG tablet Take 1-2 tablets (5-10 mg) by mouth every 6 hours as needed for pain (maximum 4 tablet(s) per day)     pregabalin (LYRICA) 150 MG capsule Take 1 capsule (150 mg) by mouth 3 times daily     QUEtiapine (SEROQUEL) 25 MG tablet Take 25 mg by mouth 4 times daily as needed Taking 50 MG @ HS per pt report     ramipril (ALTACE) 10 MG capsule Take 1 capsule (10 mg) by mouth daily     rizatriptan (MAXALT-MLT) 5 MG ODT Take 1 tablet (5 mg) by mouth at onset of headache for migraine     rosuvastatin (CRESTOR) 40 MG tablet Take 1 tablet (40 mg) by mouth daily     syringe, disposable, (BD TUBERCULIN SYRINGE) 1 ML MISC Equipment being ordered: 1 ml tuberculin syringes to be used for Vitamin B12 injections.     vitamin C (ASCORBIC ACID) 500 MG tablet Take 500 mg by mouth daily     order for DME Equipment being ordered: Assure Compression stockings (ANNETTA) Large,  "closed toe, thigh-high 30-40 compression     order for DME Jobst thigh high hose:  30-40 mmHg    Equipment being ordered: Jobst thigh high hose 30-40 mmHg     order for DME Equipment being ordered: lumbosacral belt/brace     order for DME Respironics REMSTAR 60 Series Auto CPAP 9-13 cm H2O, Wisp nasal mask w/a large cushion and a chinstrap     No current facility-administered medications for this visit.          Social History   See HPI    Family History     Family History   Problem Relation Age of Onset     Musculoskeletal Disorder Mother         back     Anxiety Disorder Mother      Colon Polyps Mother      Ulcerative Colitis Mother         and ischemic small intestine, surgery     Hypertension Mother      Breast Cancer Mother      Osteoporosis Mother      Diabetes Mother         Type 2, Diagnosed in 2014     Depression Mother         Takes Cymbalta to help with chronic pain + depx     Thyroid Disease Mother         Hypothyroidism     Obesity Mother         Under much better control latter half of 2015     Musculoskeletal Disorder Father         back     Substance Abuse Father      Hypertension Father      Hyperlipidemia Father      Depression Father         Off meds for many years. Seems \"ok\"     Heart Disease Maternal Grandmother      Heart Disease Maternal Grandfather      Psychotic Disorder Paternal Grandfather      Suicide Paternal Grandfather      Depression Paternal Grandfather         Pediatrician. Committed suicide by pistol in 1990.     Musculoskeletal Disorder Brother         back     Depression Brother         Expressed as anger and moodiness     Substance Abuse Brother      Substance Abuse Sister      Depression Sister         Mental Health Therapist, yet no anti-depressants?     Anxiety Disorder Sister         Mental Health Therapist, yet no anti-anxiety meds?     Other Cancer Other         Bladder Cancer - Fatal     Substance Abuse Brother      Colon Cancer No family hx of      Crohn's Disease No " "family hx of      Anesthesia Reaction No family hx of      Cancer No family hx of         No family history of skin cancer     Physical Exam     Temp Readings from Last 3 Encounters:   06/23/20 98.8  F (37.1  C) (Oral)   06/04/20 98.5  F (36.9  C) (Oral)   05/18/20 98.2  F (36.8  C) (Oral)     BP Readings from Last 5 Encounters:   06/23/20 (!) 132/92   06/04/20 121/78   05/22/20 130/83   05/18/20 132/85   05/01/20 128/82     Pulse Readings from Last 1 Encounters:   06/23/20 84     Resp Readings from Last 1 Encounters:   06/04/20 18     Estimated body mass index is 35.94 kg/m  as calculated from the following:    Height as of 6/23/20: 1.981 m (6' 6\").    Weight as of 6/23/20: 141.1 kg (311 lb).    GEN: NAD. Healthy appearing adult.   HEENT: MMM.  Anicteric, noninjected sclera. No obvious external lesions of the ear and nose. Hearing intact.  PULM: No increased work of breathing; no use of accessory muscles.  MSK:  Hands and wrists without swelling. Elbows without swelling.    SKIN: No rash or jaundice seen  PSYCH: Alert. Appropriate.     Labs / Imaging (select studies)     RF/CCP  Recent Labs   Lab Test 08/07/15  0846 09/03/14  1232   RHF <20 <20     CBC  Recent Labs   Lab Test 12/27/19  1209 09/26/19  1010 08/08/19  1658   WBC 8.0 7.7 13.6*   RBC 5.20 5.15 5.72   HGB 14.9 15.2 16.3   HCT 45.0 46.2 50.0   MCV 87 90 87   RDW 14.4 13.8 13.2    262 316   MCH 28.7 29.5 28.5   MCHC 33.1 32.9 32.6   NEUTROPHIL 45.0 39.7 54.0   LYMPH 38.1 44.9 34.8   MONOCYTE 13.7 12.2 8.6   EOSINOPHIL 2.4 2.3 1.3   BASOPHIL 0.8 0.9 0.9   ANEU 3.6 3.1 7.3   ALYM 3.0 3.5 4.7   KATIE 1.1 0.9 1.2   AEOS 0.2 0.2 0.2   ABAS 0.1 0.1 0.1     CMP  Recent Labs   Lab Test 07/03/20  1251 01/24/20  1637 09/26/19  1010 08/08/19  1658 07/21/19  1954    136  --  138 142   POTASSIUM 3.9 4.4  --  4.5 4.3   CHLORIDE 104 106  --  106 109   CO2 30 24  --  22 28   ANIONGAP 5 6  --  10 5   * 99  --  95 100*   BUN 17 14  --  13 9   CR 0.84 0.81 " 0.83 0.88 0.84   GFRESTIMATED >90 >90 >90 >90 >90   GFRESTBLACK >90 >90 >90 >90 >90   ALYCE 9.4 9.2  --  9.8 9.1   BILITOTAL  --  0.4 0.4  --  0.6   ALBUMIN  --  4.4 4.3  --  4.2   PROTTOTAL  --  7.7 7.7  --  7.5   ALKPHOS  --  104 107  --  102   AST  --  60* 48*  --  45   ALT  --  61 53  --  49     Calcium/VitaminD  Recent Labs   Lab Test 07/03/20  1251 01/24/20  1637 09/13/19  0943 08/08/19  1658  01/03/19  0844  05/03/17  1456   ALYCE 9.4 9.2  --  9.8   < > 9.6   < > 9.5   VITDT  --   --  40  --   --  38  --  30    < > = values in this interval not displayed.     ESR/CRP  Recent Labs   Lab Test 07/03/20  1251 09/26/19  1010 04/01/19  1540 02/13/16  1510   SED 4 4 4 6   CRP  --  <2.9 <2.9 3.4       Hepatitis B  Recent Labs   Lab Test 02/28/18  1157 01/06/15  1028   HBCAB Nonreactive  --    HEPBANG Nonreactive Nonreactive     Hepatitis C  Recent Labs   Lab Test 02/28/18  1157 01/06/15  1028   HCVAB Nonreactive Nonreactive   Assay performance characteristics have not been established for newborns,   infants, and children       Lyme ab screening  Recent Labs   Lab Test 02/22/19  1457   LYMEGM 0.02     Tuberculosis Screening  Recent Labs   Lab Test 02/28/18  1157 01/06/15  1028   TBRSLT Negative Negative   TBAGN 0.00 0.00       Immunization History     Immunization History   Administered Date(s) Administered     FLU 6-35 months 01/03/2019     Flu, Unspecified 08/01/2012, 09/12/2019     Influenza (H1N1) 02/04/2010     Influenza (IIV3) PF 11/11/1999, 10/28/2003, 10/15/2008, 08/21/2012, 08/02/2013, 09/09/2013     Influenza Vaccine IM > 6 months Valent IIV4 10/02/2015, 10/10/2016, 09/27/2017, 09/07/2018, 01/03/2019, 09/12/2019, 01/14/2020     Pneumo Conj 13-V (2010&after) 10/02/2015     Pneumococcal 23 valent 05/15/2009, 10/10/2016     TD (ADULT, 7+) 10/04/2002     TDAP Vaccine (Adacel) 07/16/2010, 01/23/2020     Td (Adult), Adsorbed 10/13/1997, 10/04/2002     Zoster vaccine recombinant adjuvanted (SHINGRIX) 05/29/2019,  09/12/2019          Chart documentation done in part with Dragon Voice recognition Software. Although reviewed after completion, some word and grammatical error may remain.      Video-Visit Details    Type of service:  Video Visit    Video Start Time: 3:10 PM  Video End Time: 3:19 PM    Originating Location (pt. Location): Home in [place]    Distant Location (provider location):  Home    Platform used for Video Visit: Sophia Baxter MD

## 2020-08-05 ENCOUNTER — ALLIED HEALTH/NURSE VISIT (OUTPATIENT)
Dept: PHARMACY | Facility: CLINIC | Age: 45
End: 2020-08-05
Payer: COMMERCIAL

## 2020-08-05 DIAGNOSIS — K64.9 HEMORRHOIDS, UNSPECIFIED HEMORRHOID TYPE: ICD-10-CM

## 2020-08-05 DIAGNOSIS — E11.9 TYPE 2 DIABETES MELLITUS WITHOUT COMPLICATION, WITHOUT LONG-TERM CURRENT USE OF INSULIN (H): Primary | ICD-10-CM

## 2020-08-05 DIAGNOSIS — M45.8 ANKYLOSING SPONDYLITIS OF SACRAL REGION (H): ICD-10-CM

## 2020-08-05 DIAGNOSIS — J30.1 ALLERGIC RHINITIS DUE TO POLLEN, UNSPECIFIED SEASONALITY: ICD-10-CM

## 2020-08-05 DIAGNOSIS — B02.29 POST HERPETIC NEURALGIA: ICD-10-CM

## 2020-08-05 PROCEDURE — 99607 MTMS BY PHARM ADDL 15 MIN: CPT | Performed by: PHARMACIST

## 2020-08-05 PROCEDURE — 99606 MTMS BY PHARM EST 15 MIN: CPT | Performed by: PHARMACIST

## 2020-08-05 RX ORDER — ZINC OXIDE AND COCOA BUTTER 270; 2052 MG/1; MG/1
1 SUPPOSITORY RECTAL 3 TIMES DAILY PRN
COMMUNITY
End: 2021-02-09

## 2020-08-05 RX ORDER — FLUTICASONE PROPIONATE 50 MCG
2 SPRAY, SUSPENSION (ML) NASAL DAILY
COMMUNITY
End: 2023-01-24

## 2020-08-05 RX ORDER — GUAIFENESIN AND DEXTROMETHORPHAN HYDROBROMIDE 600; 30 MG/1; MG/1
1 TABLET, EXTENDED RELEASE ORAL EVERY 12 HOURS PRN
COMMUNITY
End: 2020-08-18

## 2020-08-05 NOTE — PROGRESS NOTES
"MTM ENCOUNTER  SUBJECTIVE/OBJECTIVE:                           Joel Pineda is a 44 year old male called for a follow-up visit. He was referred to me from Ksenia Lyles.  Today's visit is a follow-up MTM visit from 6/9/2020     Patient consented to a telehealth visit: yes  Telemedicine Visit Details  Type of service:  Telephone visit  Start Time: 11:31AM  End Time: 12:01PM  Originating Location (pt. Location): Home  Distant Location (provider location):  [facility] MTM  Mode of Communication:  Telephone    Chief Complaint: Due for A1c.  Post herpatic neuralgia    Allergies/ADRs: Reviewed in Epic  Tobacco: No tobacco use  Alcohol: none  Caffeine: not assessed today  Activity: patient will start exercising, pool and walking track  PMH: Reviewed in Epic    Post Herpetic Neuralgia: patient had a recent shingles episode (scalp and right ear). Patient was feeling \"zaps\" throughout his body. Patient is already taking Lyrica 150mg three times daily, lidocaine ointment, oxycodone (patient has mainly been using after physical therapy. Patient reports the tennis ball size areas on the right side of his head. Patient reports pain is typically about a 3 but can get as bad as a 10.   Patient has held off using topical capsacin because he has read some reports that it can burn after showering and take time before it is effective.    Type 2 Diabetes:  Pt currently taking metformin XR 1000mg daily. No side effects.  SMBG: never.   Symptoms of low blood sugar? none  Symptoms of high blood sugar? none  Eye exam: up to date  Foot exam: up to date  Aspirin: Taking 81mg daily and denies side effects  Statin: Yes: Rosuvastatin   ACEi/ARB: Yes: Enalapril.   Urine Albumin:   Lab Results   Component Value Date    UMALCR 21.38 (H) 01/24/2020      Lab Results   Component Value Date    A1C 6.0 01/24/2020    A1C 5.9 09/13/2019    A1C 6.1 03/19/2019    A1C 6.7 12/24/2018    A1C 5.8 05/01/2018     Ankylosing " Spondylitis: Current medications include Enbrel 50mg weekly (hasn't been taking due to long term respiratory illness; patient has used it once in 2 months-patient will be restarting 2-3 weeks from now). Patient has been waking up with pain in his SI joints and fatigue has been significant. Patient was switched from Humira to Enbrel because of headaches and burning mouth. Patient follows with Dr. Baxter, Rheumatology. Patient is going to [facility] physical therapy twice weekly in [place] and this has been helpful.     Hemorrhoids/micorscopic collitis?: patient will be having further testing at [facility] in a couple of weeks. Patient has been using Calmol 4 suppositories 2-3 times a day and these have been helpful.     Allergies: current therapy includes fluticasone nasal spray. Patient has found that this has been effective to decrease the amount of decongestant he has needed to use.     ASSESSMENT:                             Current medications were reviewed today.     Medication Adherence: no issues identified    Post-Herpetic Neuralgia: Needs improvement. Patient is already on common treatment. Patient has a pain consult coming up to discuss other options.    Ankylosing Spondylitis: Stable     Diabetes: Needs improvement. Patient is due for updated A1c.    Hemorrhoids/Microscopic Colitis: Needs improvement. Patient is having further workup with [facility]. Patient would benefit from contacting [facility] to discuss if any of his medications need to be held prior to his procdedure.    Allergies: Stable    PLAN:                          Recommend updated A1c. Order placed.  Recommend patient contact [facility] about prep instructions.    I spent 30 minutes with this patient today.  All changes were made via collaborative practice agreement with Ksenia Lyles. A copy of the visit note was provided to the patient's primary care provider.    Will follow up in 8 weeks.    The patient was sent via [employer] a summary of  these recommendations as an after visit summary.     Radha Mcdonald, Pharm.D, [facility]  Medication Therapy Management Pharmacist

## 2020-08-05 NOTE — PATIENT INSTRUCTIONS
Recommendations from today's MTM visit:                                                      Recommend updated A1c. Order placed.  Recommend contacting [facility] about prep instructions.    It was great to speak with you today.  I value your experience and would be very thankful for your time with providing feedback on our clinic survey. You may receive a survey via email or text message in the next few days.     Next MTM visit: 2 months    To schedule another MTM appointment, please call the clinic directly or you may call the MTM scheduling line at [phone] or toll-free at [phone].     My Clinical Pharmacist's contact information:                                                      It was a pleasure talking with you today!  Please feel free to contact me with any questions or concerns you have.      Radha Mcdonald, Pharm.D, BCACP  Medication Therapy Management Pharmacist

## 2020-08-10 ENCOUNTER — MYC REFILL (OUTPATIENT)
Dept: FAMILY MEDICINE | Facility: CLINIC | Age: 45
End: 2020-08-10

## 2020-08-10 ENCOUNTER — VIRTUAL VISIT (OUTPATIENT)
Dept: PALLIATIVE MEDICINE | Facility: CLINIC | Age: 45
End: 2020-08-10
Payer: MEDICARE

## 2020-08-10 DIAGNOSIS — E08.42 DIABETIC POLYNEUROPATHY ASSOCIATED WITH DIABETES MELLITUS DUE TO UNDERLYING CONDITION (H): ICD-10-CM

## 2020-08-10 DIAGNOSIS — Z79.899 HIGH RISK MEDICATION USE: ICD-10-CM

## 2020-08-10 DIAGNOSIS — B02.29 POSTHERPETIC NEURALGIA: Primary | ICD-10-CM

## 2020-08-10 DIAGNOSIS — M45.8 ANKYLOSING SPONDYLITIS OF SACRAL REGION (H): ICD-10-CM

## 2020-08-10 DIAGNOSIS — M51.369 DDD (DEGENERATIVE DISC DISEASE), LUMBAR: ICD-10-CM

## 2020-08-10 PROCEDURE — 99207 ZZC DOWN CODE DUE TO SUBSEQUENT EXAM: CPT | Performed by: NURSE PRACTITIONER

## 2020-08-10 PROCEDURE — 99215 OFFICE O/P EST HI 40 MIN: CPT | Mod: 95 | Performed by: NURSE PRACTITIONER

## 2020-08-10 ASSESSMENT — PAIN SCALES - GENERAL: PAINLEVEL: MILD PAIN (3)

## 2020-08-10 NOTE — PROGRESS NOTES
"Joel Pineda is a 45 year old male who is being evaluated via a billable video visit.      The patient has been notified of following:     \"This video visit will be conducted via a call between you and your physician/provider. We have found that certain health care needs can be provided without the need for an in-person physical exam.  This service lets us provide the care you need with a video conversation.  If a prescription is necessary we can send it directly to your pharmacy.  If lab work is needed we can place an order for that and you can then stop by our lab to have the test done at a later time.    Video visits are billed at different rates depending on your insurance coverage.  Please reach out to your insurance provider with any questions.    If during the course of the call the physician/provider feels a video visit is not appropriate, you will not be charged for this service.\"    Patient has given verbal consent for Video visit? Yes  How would you like to obtain your AVS? MyChart  If you are dropped from the video visit, the video invite should be resent to: Text to cell phone: [phone]  Will anyone else be joining your video visit? No        Video-Visit Details    Type of service:  Video Visit    Video Start Time: 1:02 PM  Video End Time: 1:47 PM    Originating Location (pt. Location): Home    Distant Location (provider location):  [facility]     Platform used for Video Visit: Spoke        This patient is being seen in consultation at the request of his primary care provider Dr Root,  for evaluation of his pain issues and recommendations for management, with specific emphasis on:     Reason for Referral: Consult-only- patient seen for one-time   Do you have any specific questions for the pain specialist? Yes: Pain management for shingles, already on Lyrica 450 mg daily, Topical Lidocaine, Oxycodone, topical Capsaicin   Are there any red flags that may impact the assessment or " management of the patient? None   What is your diagnosis for the patient's pain? Herpes Zoster. Symptoms less than 4 months (hence defer diagnosis of post herpetic neuralgia)    Primary Care Provider is Ksenia Lyles    Current controlled substance medications are being prescribed by: PCP    CHIEF COMPLAINT:  Post herpetic neuralgia    HISTORY OF PRESENT ILLNESS:  Joel Pineda is a 45 year old male with history of multiple pain problems     Pain Information:   Onset/Progression:  Has had shingles 4 times. Never had PHN with previous episodes. Has had shingles vaccination. Also has ankylosing spondylitis. Started mid June ould put on biologic med. Had pneumonia and thought is that may have triggered shingles. Using topical lidocaine 1st and then capsaicin. Pain is somewhat better controlled. At times has used oxycdoone. Pressure , leaning on shair. Has had ECT and transcranial magnetic stimulation for severe depression. Since 6260-6388  was hospitalization for mental health/depression annually.  Both of these treatments have lowered his threshold for headaches.  He has polyneuropathy which started about 18 mo ago (2018), started with pain in right foot while snow shoeing, had elevated glucose and dx with Type 2DM. Eval with Dr Santamaria at the . Also diagnosed with POTS.     Migraines: 1-2 migraines per week which last hours to a full day.  Nausea, no vomiting.  Has aura, light flashes.  Not sensitive to light but is very sensitive to sound during headaches     Pain quality: Burning, Miserable, Penetrating, Sharp, Shooting and Tender    Pain timing: Intermittent and variable      Pain rating: intensity ranges from 2/10 to 5/10, and averages 3/10 on a 0-10 scale.   Aggravating factors include: leaning areas of neuralgia such as leaning back in a chair.  Pressure. Extreme fatigue, stress.   Relieving factors include: lidocaine, capsaicin, oxycodone    Past Pain Treatments:    Pain Clinic:   Yes    [facility] pain  management: For spinal injections with Dr Diaz, [facility]: injections, nerve ablation, [facility] for SCS treatment.  Did trial but did not find it beneficial.   [facility] chronic pain program.        PT: Yes  For other reasons. Neck, back and feet   Psychologist: Yes ongoing with private therapist.at  [facility].    Chiropractor: Yes years ago   Acupuncture: Yes [facility]   Pharmacotherapy:     Opioids: Yes      Non-opioids:  Yes    TENs Unit:Yes H for back    Injections: Yes Many for neck and back    Surgeries related to pain: Yes     October 2007 L5-S1 laminectomy, micro discectomy      Current Pain Relevant Medications:    Oxycodone 5 mg PRN chronic pain, occasional for PHN  Lyrica 100 mg 3 times daily  Relafen 500 mg, 1 to 2 tablets daily  Lidocaine, Capsaicin topically 2-3 times daily  Xanax 0.5 mg in afternoon, 1 mg at HS    Previous Pain Relevant Medications: ([facility]--helped; [facility]--Helped initially; [facility]--Somewhat helpful; [facility]--No help; W--worse; [facility]--side effects; ?--Unsure if helpful)   NOTE: This medication information taken from patient's intake form, not medical records.  This will be added at a later date if patient decides to enter comprehensive pain management program    Any illicit drug use: denies   EtOH use: none   Caffeine use: 6-8 per day   Nicotine use: None    THE 4 As OF OPIOID MAINTENANCE ANALGESIA    Analgesia: Is pain relief clinically significant? YES   Activity: Is patient functional and able to perform Activities of Daily Living? YES   Adverse effects: Is patient free from adverse side effects from opiates? YES   Adherence to Rx protocol: Is patient adhering to Controlled Substance Agreement and taking medications ONLY as ordered? YES    Is Narcan prescribed for opiate use >50 MME daily or concurrent use of opiates and benzodiazepines?  Yes prescribed by this writer 8/10/2020    [place] Board of Pharmacy Data Base Reviewed:    YES; No concern for abuse or misuse of controlled medications based on  this report.       PAST MEDICAL HISTORY:   Past Medical History:   Diagnosis Date     Depressive disorder 16 years ago    No longer an issue     Hyperlipidemia LDL goal <160      Hypertension goal BP (blood pressure) < 140/90      Mild persistent asthma 4/9/2014       CURRENT FAMILY/SOCIAL SITUATION:  Living situation: town home with cat, Squeaky   Support system: Siblings, mom and step-dad., best friend: Tono  Occupation: On disability many years   Current stressors: pain, health issues, parent's health issues, worries about how tala treats his mom.   Safety concerns: balance/dizziness r/t POTS>     FAMILY MEDICAL HISTORY:  Chronic pain: Yes Mom  Family history of headaches:  Yes Mom      ALLERGIES:  Allergies   Allergen Reactions     Hydromorphone Hives and Shortness Of Breath     Penicillins Shortness Of Breath     Nsaids Other (See Comments)     Acute Kidney Injury     Valsartan Other (See Comments)     Acute Kidney Injury       MEDICATIONS:  Current Outpatient Medications   Medication Sig Dispense Refill     acetaminophen (TYLENOL) 325 MG tablet Take 2 tablets (650 mg) by mouth every 6 hours as needed for mild pain 50 tablet 0     albuterol (PROAIR HFA/PROVENTIL HFA/VENTOLIN HFA) 108 (90 Base) MCG/ACT inhaler INHALE TWO PUFFS EVERY 6 HOURS AS NEEDED FOR SHORTNESS OF BREATH/DYSPNEA/WHEEZING 8.5 Inhaler 3     amitriptyline (ELAVIL) 25 MG tablet Take 2 tablets (50 mg) by mouth At Bedtime for 14 days, THEN 1 tablet (25 mg) At Bedtime for 14 days. TAKE 1 TAB BY MOUTH AT NIGHTTIME FOR SLEEP AND PAIN 42 tablet 0     amLODIPine (NORVASC) 10 MG tablet TAKE 1 TABLET BY MOUTH EVERY DAY 90 tablet 1     baclofen (LIORESAL) 10 MG tablet Take 1 tablet (10 mg) by mouth 3 times daily as needed for muscle spasms 90 tablet 2     chlorthalidone (HYGROTON) 25 MG tablet TAKE 1 TABLET BY MOUTH EVERY DAY 90 tablet 1     halobetasol (ULTRAVATE) 0.05 % cream Apply topically 2 times daily 30 g 1     HYDROcodone-acetaminophen (NORCO)  5-325 MG tablet Take 1 tablet by mouth every 6 hours as needed for severe pain Must last 30 days 30 tablet 0     lidocaine (LIDODERM) 5 % patch Place 1 patch onto the skin every 24 hours To prevent lidocaine toxicity, patient should be patch free for 12 hrs daily. 10 patch 1     methylPREDNISolone (MEDROL DOSEPAK) 4 MG tablet therapy pack Follow Package Directions 21 tablet 0     metoprolol succinate ER (TOPROL-XL) 200 MG 24 hr tablet TAKE 1 TABLET BY MOUTH EVERY DAY 90 tablet 0     montelukast (SINGULAIR) 10 MG tablet Take 1 tablet (10 mg) by mouth At Bedtime 90 tablet 3     ondansetron (ZOFRAN) 4 MG tablet TAKE 1 TABLET (4 MG) BY MOUTH EVERY 8 HOURS AS NEEDED FOR NAUSEA 20 tablet 3     potassium chloride ER (K-DUR/KLOR-CON M) 20 MEQ CR tablet Take 1 tablet (20 mEq) by mouth daily 90 tablet 3     zolpidem (AMBIEN) 10 MG tablet Take 1 tablet (10 mg) by mouth nightly as needed for sleep 30 tablet 2         REVIEW OF SYSTEMS:   Constitutional:  Weight Gain  Eyes/Head: Dizziness, Headache and Vision Changes  Ears/Nose/Throat: Hearing Loss  Allergy/Immune: Negative  Skin:Shingles, occasional hives   Hematologic/Lymphatic/Immunologic:Negative  Respiratory: Shortness of Breath  Cardiovascular: High blood pressure and Palpitations  Gastrointestinal: Abdominal pain, Constipation, Diarrhea and Nausea  Endocrine: Diabetes, Steroid use and Thyroid disease  Musculoskeletal: Arthritis, Back pain, Joint pain, Neck pain and Stiffness  Urinary:  Negative   Any bowel or bladder incontinence: Denies   Neurologic: Memory loss and Numbness/Tingling  Psychiatric: Anxiety, Depression, Mood swings and Stress    PHYSICAL EXAM:    There were no vitals taken for this visit.     Appearance:     A&O. Patient is appropriate.   Patient is in NAD.     Psychiatric:  affect and mood normal, mentation appears normal    DIRE Score for ongoing opioid management is calculated as follows:    Diagnosis = 3 pts (advanced condition; severe pain/objective  findings)    Intractability = 3 pts (patient fully engaged but inadequate response to treatments)    Risk        Psych = 3 pts (no significant personality dysfunction/mental illness; good communication with clinic)         Chem Hlth = 3 pts (no history of chemical dependency; not drug-focused)       Reliability = 2 pts (occasional difficulties with compliance; generally reliable)       Social = 2 pts (reduction in some relationships/life rolls)       (Psych + Chem hlth + Reliability + Social) = 16    Efficacy = 2 pts (moderate benefit/function; low med dose; too early/not tried meds)    DIRE Score = 18        7-13: likely NOT suitable candidate for long-term opioid analgesia       14-21: may be a suitable candidate for long-term opioid analgesia    Previous Diagnostic Tests:   Imaging Studies:   None related to postherpetic neuralgia    ASSESSMENT:   1.  Postherpetic neuralgia  2.  Ankylosing spondylosis  3.  Chronic migraine headaches  4.  History: Complex medical issues, see history    Tito is a pleasant man who presents for evaluation regarding postherpetic neuralgia.  Unfortunately this is his fourth episode of having shingles.  He has had the 2 part immunization which did not appear to have helped him.  He believes this was brought on by stress from an episode of pneumonia earlier this year.  He is proactive in seeking pain relief chronic rheumatological pain as well diabetic polyneuropathy.      Tobacco: Encouraged complete tobacco cessation.       Plan:    Diagnosis reviewed, treatment option addressed, and risk/benifits discussed.  Self-care instructions given.  I am recommending a multidisciplinary treatment plan to help this patient better manage pain.      1. Schedule follow-up with JOCELYN Zavala NP-C in 4-8 weeks or sooner as needed.  If you would like to participate in the multidisciplinary pain management program please schedule a 60-minute follow-up appointment.  2. Medical cannabis  certification for topical cannabis.  Apply to healed lesion sites and feet for neuropathic pain.  You must your primary care provider before starting medical cannabis.  3. Medication recommendations: Continue current medications    I have reviewed the note as documented above.  This accurately captures the substance of my conversation with the patient.      I would like to thank Dr. Root for allowing me to participate in the management of this patient.     JOCELYN Padgett, NP-C  [facility] [place]

## 2020-08-11 NOTE — PATIENT INSTRUCTIONS
After Visit Instructions:     Thank you for coming to [facility] [place] for your care. It is my goal to partner with you to help you reach your optimal state of health.     Continue daily self-care, identifying contributing factors, and monitoring variations in pain level. Continue to integrate self-care into your life.      1. Schedule follow-up with JOCELYN Zavala NP-C in 4-8 weeks or sooner as needed.  If you would like to participate in the multidisciplinary pain management program please schedule a 60-minute follow-up appointment.  2. Medical cannabis certification for topical cannabis.  Apply to healed lesion sites and feet for neuropathic pain.  You must your primary care provider before starting medical cannabis.  3. Medication recommendations: Continue current medications      JOCELYN Padgett NP-C  [facility]    Clinic Number:  [phone]     Call with any questions about your care and for scheduling assistance.     Calls are returned Monday through Friday between 8 AM and 4:30 PM. We usually get back to you within 2 business days depending on the issue/request.    If we are prescribing your medications:    For opioid medication refills, call the clinic or send a [employer] message 7 days in advance.  Please include:    Name of requested medication    Name of the pharmacy.    For non-opioid medications, call your pharmacy directly to request a refill. Please allow 3-4 days to be processed.     Per [place] State Law:    All controlled substance prescriptions must be filled within 30 days of being written.      For those controlled substances allowing refills, pickup must occur within 30 days of last fill.      We believe regular attendance is key to your success in our program!      Any time you are unable to keep your appointment we ask that you call us at least 24 hours in advance to cancel.This will allow us to offer the appointment  time to another patient.   Multiple missed appointments may lead to dismissal from the clinic.

## 2020-08-13 NOTE — TELEPHONE ENCOUNTER
Controlled Substance Refill Request for Oxycodone  Problem List Complete:  Yes  Patient is followed by Ksenia Lyles MD for ongoing prescription of pain medication.  All refills should only be approved by this provider, or covering partner.     Medication(s): oxy 5.   Maximum quantity per month: 40  Clinic visit frequency required: Q3  months      Controlled substance agreement: 6/23/2020  UDS: Aug 2019     Encounter-Level CSA - 04/04/2017:            Controlled Substance Agreement - Scan on 4/11/2017  1:30 PM : CONTROLLED SUBSTANCE AGREEMENT (below)                   Pain Clinic evaluation in the past: Yes     DIRE Total Score(s):  No flowsheet data found.     Last [facility] website verification:  05/22/20     Sapna Barragan RN, [facility] Triage

## 2020-08-14 RX ORDER — OXYCODONE HYDROCHLORIDE 5 MG/1
5-10 TABLET ORAL EVERY 6 HOURS PRN
Qty: 35 TABLET | Refills: 0 | Status: SHIPPED | OUTPATIENT
Start: 2020-08-14 | End: 2020-08-27

## 2020-08-18 ENCOUNTER — MEDICAL CORRESPONDENCE (OUTPATIENT)
Dept: HEALTH INFORMATION MANAGEMENT | Facility: CLINIC | Age: 45
End: 2020-08-18

## 2020-08-18 ENCOUNTER — TELEPHONE (OUTPATIENT)
Dept: FAMILY MEDICINE | Facility: CLINIC | Age: 45
End: 2020-08-18

## 2020-08-18 ENCOUNTER — OFFICE VISIT (OUTPATIENT)
Dept: FAMILY MEDICINE | Facility: CLINIC | Age: 45
End: 2020-08-18
Payer: MEDICARE

## 2020-08-18 VITALS
WEIGHT: 315 LBS | OXYGEN SATURATION: 96 % | HEART RATE: 76 BPM | DIASTOLIC BLOOD PRESSURE: 76 MMHG | SYSTOLIC BLOOD PRESSURE: 119 MMHG | TEMPERATURE: 98.2 F | BODY MASS INDEX: 37.19 KG/M2

## 2020-08-18 DIAGNOSIS — E11.9 TYPE 2 DIABETES MELLITUS WITHOUT COMPLICATION, WITHOUT LONG-TERM CURRENT USE OF INSULIN (H): ICD-10-CM

## 2020-08-18 DIAGNOSIS — Z86.19 HISTORY OF SHINGLES: Primary | ICD-10-CM

## 2020-08-18 DIAGNOSIS — R21 RASH AND NONSPECIFIC SKIN ERUPTION: ICD-10-CM

## 2020-08-18 PROBLEM — B02.29 PHN (POSTHERPETIC NEURALGIA): Status: ACTIVE | Noted: 2020-07-15

## 2020-08-18 PROBLEM — G90.1 DYSAUTONOMIA (H): Status: ACTIVE | Noted: 2020-08-18

## 2020-08-18 PROBLEM — D17.30 LIPOMA OF SKIN AND SUBCUTANEOUS TISSUE: Status: RESOLVED | Noted: 2019-12-02 | Resolved: 2020-08-18

## 2020-08-18 LAB — HBA1C MFR BLD: 6.1 % (ref 0–5.6)

## 2020-08-18 PROCEDURE — 36415 COLL VENOUS BLD VENIPUNCTURE: CPT | Performed by: FAMILY MEDICINE

## 2020-08-18 PROCEDURE — 83036 HEMOGLOBIN GLYCOSYLATED A1C: CPT | Performed by: FAMILY MEDICINE

## 2020-08-18 PROCEDURE — 99213 OFFICE O/P EST LOW 20 MIN: CPT | Performed by: NURSE PRACTITIONER

## 2020-08-18 PROCEDURE — 99000 SPECIMEN HANDLING OFFICE-LAB: CPT | Performed by: NURSE PRACTITIONER

## 2020-08-18 PROCEDURE — 87798 DETECT AGENT NOS DNA AMP: CPT | Mod: 90 | Performed by: NURSE PRACTITIONER

## 2020-08-18 NOTE — RESULT ENCOUNTER NOTE
----- [facility] sent at 8/18/2020  1:39 PM EDT -----  Subject: Referral Request    QUESTIONS   Reason for referral request? Patient went to have xrays taken as requested   by the doctor and they would not see her because there was no   authorization . pt. is gettting knee   back and spine need to be xrayed. Has the physician seen you for this condition before? No   Preferred Specialist (if applicable)? Do you already have an appointment scheduled? No  Additional Information for Provider? patient does not want to go back   unless they have authorization for xtay.   ---------------------------------------------------------------------------  --------------  CALL BACK INFO  What is the best way for the office to contact you? OK to leave message on   voicemail  Preferred Call Back Phone Number?  [phone] Final Clostridium Difficile toxin B PCR is NEGATIVE.    No treatment or change in treatment per [facility] ED Lab Result protocol.

## 2020-08-18 NOTE — PROGRESS NOTES
Subjective     Joel Pineda is a 45 year old male who presents to clinic today for the following health issues:    HPI   Rash    Duration: 8/14/20 Shingles wounds started getting worse    Description  Location: Right side of neck, and head, and PHN intermittent back of the scalp  Itching: no  Burning: Moderate    Intensity:  moderate    Accompanying signs and symptoms: None    History (similar episodes/previous evaluation): was diagnosed with shingles in june    Precipitating or alleviating factors:  New exposures: medication - Capsaicin  Recent travel: no      Therapies tried and outcome: used capsid- since stopped it wounds are getting better  See previous notes for clinical course.  He is concerned today for recurrence of shingles.  Had a red spot on neck yesterday.  He is unsure if it is just irritated from topical Capsaicin application or if related to shingles.      Patient Active Problem List   Diagnosis     Major depressive disorder, recurrent episode (H)     Intermittent asthma     Hyperlipidemia LDL goal <100     Chronic nonallergic rhinitis     Diverticulosis     GERD (gastroesophageal reflux disease)     Anxiety     LLOYD (obstructive sleep apnea)- mild (AHI 11)     Intracranial arachnoid cyst     Facet arthritis of cervical region     Acquired hypothyroidism     Bipolar 2 disorder (H)     Chronic midline low back pain without sciatica     Irritable bowel syndrome with diarrhea     B12 deficiency     Essential hypertension with goal blood pressure less than 140/90     Chronic pain syndrome     Ankylosing spondylitis of sacral region (H)     Morbid obesity due to hypertriglyceridemia (H)     Fatty infiltration of liver     DDD (degenerative disc disease), lumbar     Type 2 diabetes mellitus with complication, without long-term current use of insulin (H)     Peripheral polyneuropathy     History of pulmonary embolism     Ingrown toenail     Hypertriglyceridemia     Gastroparesis     Orthostatic dizziness      POTS (postural orthostatic tachycardia syndrome)     PHN (postherpetic neuralgia)     Dysautonomia (H)     Past Surgical History:   Procedure Laterality Date     BACK SURGERY  10/07    lumbar discectomy L5-S1     COLONOSCOPY      Note: colonoscopy scheduled with [facility] on Friday, 9/4/15     COSMETIC SURGERY  2012    Nose Exterior - functional     GI SURGERY  August 2013    Sigmoidectomy     HERNIA REPAIR, UMBILICAL  8/23/11    [place], Dr. Evan Beavers     INCISION AND DRAINAGE, ABSCESS, COMPLEX  8/23/11    umbilical, Dr. Evan Beavers     LAPAROSCOPIC ASSISTED COLECTOMY LEFT (DESCENDING)  8/15/2013    Procedure: LAPAROSCOPIC ASSISTED COLECTOMY LEFT (DESCENDING);  Laparoscopic Hand Assisted Sigmoid Resection, Mobilization of Splenic Fissure, coloproctoscopy, *Latex Free Room* Anesthesia General with Pain block  ;  Surgeon: Aurora Justice MD;  Location: [facility] OR     NERVE SURGERY  8/18/11    RF ablation @ L3-S1 @ [facility]     RECONSTRUCT NOSE AND SEPTUM (FUNCTIONAL)  10/14/2011    Procedure:RECONSTRUCT NOSE AND SEPTUM (FUNCTIONAL); Functional Septorhinoplasty, Turbinate Reduction, ; Surgeon:CEDRIC CUEVAS; Location:[facility] OR     SINUS SURGERY  10/1/01    ethmoidectomy chronic sinusitis       Social History     Tobacco Use     Smoking status: Never Smoker     Smokeless tobacco: Never Used   Substance Use Topics     Alcohol use: Not Currently     Alcohol/week: 0.0 standard drinks     Drinks per session: 1 or 2     Binge frequency: Weekly     Comment: occ 1/week     Family History   Problem Relation Age of Onset     Musculoskeletal Disorder Mother         back     Anxiety Disorder Mother      Colon Polyps Mother      Ulcerative Colitis Mother         and ischemic small intestine, surgery     Hypertension Mother      Breast Cancer Mother      Osteoporosis Mother      Diabetes Mother         Type 2, Diagnosed in 2014     Depression Mother         Takes Cymbalta to help with chronic pain + depx     Thyroid Disease Mother          "Hypothyroidism     Obesity Mother         Under much better control latter half of 2015     Musculoskeletal Disorder Father         back     Substance Abuse Father      Hypertension Father      Hyperlipidemia Father      Depression Father         Off meds for many years. Seems \"ok\"     Heart Disease Maternal Grandmother      Heart Disease Maternal Grandfather      Psychotic Disorder Paternal Grandfather      Suicide Paternal Grandfather      Depression Paternal Grandfather         Pediatrician. Committed suicide by pistol in 1990.     Musculoskeletal Disorder Brother         back     Depression Brother         Expressed as anger and moodiness     Substance Abuse Brother      Substance Abuse Sister      Depression Sister         Mental Health Therapist, yet no anti-depressants?     Anxiety Disorder Sister         Mental Health Therapist, yet no anti-anxiety meds?     Other Cancer Other         Bladder Cancer - Fatal     Substance Abuse Brother      Colon Cancer No family hx of      Crohn's Disease No family hx of      Anesthesia Reaction No family hx of      Cancer No family hx of         No family history of skin cancer         Current Outpatient Medications   Medication Sig Dispense Refill     acetaminophen 500 MG CAPS Take 500 mg by mouth every 4 hours as needed 60 capsule      albuterol (PROAIR HFA/PROVENTIL HFA/VENTOLIN HFA) 108 (90 Base) MCG/ACT inhaler Inhale 2 puffs into the lungs every 4 hours as needed for shortness of breath / dyspnea or wheezing 8.5 g 11     albuterol (PROVENTIL) (2.5 MG/3ML) 0.083% neb solution Take 1 vial (2.5 mg) by nebulization every 6 hours as needed for shortness of breath / dyspnea or wheezing 200 mL 3     ALPRAZolam (XANAX) 0.5 MG tablet Take 1 tablet up to three times daily.       aspirin (ASA) 81 MG tablet Take 81 mg by mouth daily        cetirizine (ZYRTEC) 10 MG tablet Take 1 tablet (10 mg) by mouth At Bedtime 30 tablet 11     cholecalciferol (VITAMIN D3) 41732 units (1250 " mcg) capsule Take one capsule every two weeks. 24 capsule 1     cyanocobalamin (CYANOCOBALAMIN) 1000 MCG/ML injection Inject 1 mL (1,000 mcg) into the muscle every 30 days (Patient taking differently: Inject 1 mL into the muscle every 30 days Taking this injection every 3 weeks per pt report) 10 mL 0     DULoxetine (CYMBALTA) 60 MG capsule Take 60 mg by mouth 2 times daily        EPINEPHrine (EPIPEN/ADRENACLICK/OR ANY BX GENERIC EQUIV) 0.3 MG/0.3ML injection 2-pack Inject 0.3 mLs (0.3 mg) into the muscle once as needed for anaphylaxis 0.6 mL 3     famotidine (PEPCID) 20 MG tablet Prior to administration of Humira every 2 weeks (Patient taking differently: Patient is taking this 1 time weekly.)       fluticasone (FLONASE) 50 MCG/ACT nasal spray Spray 1 spray into both nostrils daily       fluticasone-salmeterol (ADVAIR) 500-50 MCG/DOSE inhaler Inhale 1 puff into the lungs every 12 hours 3 Inhaler 3     lamoTRIgine (LAMICTAL) 100 MG tablet Take 200 mg by mouth daily       levothyroxine (SYNTHROID/LEVOTHROID) 75 MCG tablet TAKE 1 TABLET EVERY MORNING 90 tablet 1     lidocaine (XYLOCAINE) 5 % external ointment Apply topically 4 times daily as needed (pain) 50 g 2     Liniments (SALONPAS EX) Externally apply 1 Application topically daily as needed       melatonin 3 MG tablet Take 6 mg by mouth nightly as needed for sleep       metFORMIN (GLUCOPHAGE-XR) 500 MG 24 hr tablet Take 2 tablets (1,000 mg) by mouth daily (with dinner) 180 tablet 0     metoclopramide (REGLAN) 5 MG tablet TAKE ONE TABLET BY MOUTH THREE TIMES DAILY AS NEEDED FOR NAUSEA  90 tablet 0     metoprolol succinate ER (TOPROL-XL) 200 MG 24 hr tablet Take 1 tablet (200 mg) by mouth 2 times daily 180 tablet 0     montelukast (SINGULAIR) 10 MG tablet Take 1 tablet (10 mg) by mouth every evening 90 tablet 1     nabumetone (RELAFEN) 500 MG tablet Take 1-2 tablets (500-1,000 mg) by mouth 2 times daily (with meals) as needed for back/joint pain. 360 tablet 0      naloxone (NARCAN) 4 MG/0.1ML nasal spray Spray 1 spray (4 mg) into one nostril alternating nostrils as needed for opioid reversal every 2-3 minutes until assistance arrives 0.2 mL 0     omega-3 acid ethyl esters (LOVAZA) 1 g capsule TAKE 2 CAPSULES BY MOUTH TWICE DAILY. 360 capsule 2     omeprazole 20 MG tablet Take 20 mg by mouth daily        order for DME Equipment being ordered: Assure Compression stockings (ANNETTA) Large, closed toe, thigh-high 30-40 compression 2 Units 3     order for DME Jobst thigh high hose:  30-40 mmHg    Equipment being ordered: Jobst thigh high hose 30-40 mmHg 1 Units 1     order for DME Equipment being ordered: lumbosacral belt/brace 1 Units 0     order for DME Respironics REMSTAR 60 Series Auto CPAP 9-13 cm H2O, Wisp nasal mask w/a large cushion and a chinstrap       oxyCODONE (ROXICODONE) 5 MG tablet Take 1-2 tablets (5-10 mg) by mouth every 6 hours as needed for pain (maximum 4 tablet(s) per day) 35 tablet 0     pregabalin (LYRICA) 150 MG capsule Take 1 capsule (150 mg) by mouth 3 times daily 270 capsule 1     QUEtiapine (SEROQUEL) 25 MG tablet Take 25 mg by mouth 4 times daily as needed Taking 50 MG @ HS per pt report       ramipril (ALTACE) 10 MG capsule Take 1 capsule (10 mg) by mouth daily 90 capsule 1     Rectal Protectant-Emollient (CALMOL-4) 76-10 % SUPP Place 1 suppository rectally 3 times daily       rizatriptan (MAXALT-MLT) 5 MG ODT Take 1 tablet (5 mg) by mouth at onset of headache for migraine 30 tablet 5     rosuvastatin (CRESTOR) 40 MG tablet Take 1 tablet (40 mg) by mouth daily 90 tablet 0     syringe, disposable, (BD TUBERCULIN SYRINGE) 1 ML MISC Equipment being ordered: 1 ml tuberculin syringes to be used for Vitamin B12 injections. 12 each 11     vitamin C (ASCORBIC ACID) 500 MG tablet Take 500 mg by mouth daily       etanercept (ENBREL SURECLICK) 50 MG/ML autoinjector Inject 50 mg Subcutaneous once a week Hold for signs of infection, and seek medical attention.  (Patient not taking: Reported on 8/5/2020) 4 mL 5     Allergies   Allergen Reactions     Amoxicillin-Pot Clavulanate Difficulty breathing     Banana Shortness Of Breath     Pt reports organic Banana is okay.      Nitroglycerin Palpitations     Penicillins Anaphylaxis     Provigil [Modafinil] Shortness Of Breath     headache     Gadolinium Hives and Itching     Patient was premedicated for the contrast allergy. He did still have a reaction a few hours after injection. Hives and itching. Dr. Gomez told tech to inform pt he should only have contrast again in the future when premedicated and at a hospital. Not at an outpatient facility.      Ketoconazole      Topical cream caused swelling and itching     Dye [Contrast Dye] Other (See Comments) and Hives     Moderate flushing, CT contrast     Golimumab      Hives, bradycardia, face swelling     Neurontin [Gabapentin] Hives     Moderate hives     Nortriptyline Hives     Varicella Virus Vaccine Live      Rash     Flagyl [Metronidazole Hcl] Palpitations and Hives     Latex Rash     Metronidazole Palpitations, Other (See Comments) and Rash     dizziness (versus ciprofloxacin taken at same time)     No Clinical Screening - See Comments Rash     Nitrile gloves     BP Readings from Last 3 Encounters:   08/18/20 119/76   06/23/20 (!) 132/92   06/04/20 121/78    Wt Readings from Last 3 Encounters:   08/18/20 146 kg (321 lb 12.8 oz)   06/23/20 141.1 kg (311 lb)   06/04/20 141.3 kg (311 lb 8 oz)                    Reviewed and updated as needed this visit by Provider         Review of Systems   Constitutional, HEENT, cardiovascular, pulmonary, GI, , musculoskeletal, neuro, skin, endocrine and psych systems are negative, except as otherwise noted.      Objective    /76   Pulse 76   Temp 98.2  F (36.8  C) (Oral)   Wt 146 kg (321 lb 12.8 oz)   SpO2 96%   BMI 37.19 kg/m    Body mass index is 37.19 kg/m .  Physical Exam   GENERAL: healthy, alert and no  distress  SKIN: posterior scalp with 3-4 mild small raised erythematous lesions without vesicular appearance.  No tenderness to palpation.  No drainage noted.  No open wounds.    NEURO: Normal strength and tone, mentation intact and speech normal  PSYCH: mentation appears normal, affect normal/bright    Diagnostic Test Results:  Labs reviewed in Epic        Assessment & Plan     1. History of shingles  Does not appear like a new eruption of shingles.  Will confirm with lab testing below, however.  Advised stopping Capsaicin as could be related to that.  Patient can apply Aquaphor to areas though none are open so will likely heal on own once irritant (Capsaicin) is discontinued.   - Varicella Zoster Virus by PCR Blood or Tissue    2. Rash and nonspecific skin eruption  - Varicella Zoster Virus by PCR Blood or Tissue       See Patient Instructions    Return in about 1 week (around 8/25/2020), or if symptoms worsen or fail to improve.    JOCELYN Tilley [facility]

## 2020-08-18 NOTE — Clinical Note
DILEEP on this patient.  Saw him today. I don't think his rash is herpetic.  He is very nice.  Took me longer to review his chart than to actually see him- so complex!  -Magdalene

## 2020-08-21 LAB
SPECIMEN SOURCE: NORMAL
VZV DNA SPEC QL NAA+PROBE: NOT DETECTED

## 2020-08-23 ENCOUNTER — MYC REFILL (OUTPATIENT)
Dept: FAMILY MEDICINE | Facility: CLINIC | Age: 45
End: 2020-08-23

## 2020-08-23 DIAGNOSIS — R11.0 NAUSEA: ICD-10-CM

## 2020-08-23 DIAGNOSIS — E78.5 HYPERLIPIDEMIA LDL GOAL <100: ICD-10-CM

## 2020-08-23 DIAGNOSIS — E03.9 ACQUIRED HYPOTHYROIDISM: ICD-10-CM

## 2020-08-25 RX ORDER — METOCLOPRAMIDE 5 MG/1
5 TABLET ORAL 3 TIMES DAILY PRN
Qty: 90 TABLET | Refills: 1 | Status: SHIPPED | OUTPATIENT
Start: 2020-08-25 | End: 2020-10-06

## 2020-08-25 RX ORDER — ROSUVASTATIN CALCIUM 40 MG/1
40 TABLET, COATED ORAL DAILY
Qty: 90 TABLET | Refills: 0 | Status: SHIPPED | OUTPATIENT
Start: 2020-08-25 | End: 2020-10-27

## 2020-08-25 RX ORDER — LEVOTHYROXINE SODIUM 75 UG/1
TABLET ORAL
Qty: 90 TABLET | Refills: 0 | Status: SHIPPED | OUTPATIENT
Start: 2020-08-25 | End: 2020-11-29

## 2020-08-25 NOTE — TELEPHONE ENCOUNTER
Prescription approved per [facility].  Heather White RN  [facility]

## 2020-08-27 ENCOUNTER — OFFICE VISIT (OUTPATIENT)
Dept: PODIATRY | Facility: CLINIC | Age: 45
End: 2020-08-27
Payer: MEDICARE

## 2020-08-27 ENCOUNTER — MYC REFILL (OUTPATIENT)
Dept: FAMILY MEDICINE | Facility: CLINIC | Age: 45
End: 2020-08-27

## 2020-08-27 VITALS
SYSTOLIC BLOOD PRESSURE: 110 MMHG | DIASTOLIC BLOOD PRESSURE: 76 MMHG | BODY MASS INDEX: 37.21 KG/M2 | WEIGHT: 315 LBS | HEART RATE: 88 BPM

## 2020-08-27 DIAGNOSIS — M45.8 ANKYLOSING SPONDYLITIS OF SACRAL REGION (H): ICD-10-CM

## 2020-08-27 DIAGNOSIS — M77.8 CAPSULITIS OF FOOT, RIGHT: Primary | ICD-10-CM

## 2020-08-27 DIAGNOSIS — L60.0 INGROWN TOENAIL: ICD-10-CM

## 2020-08-27 DIAGNOSIS — M51.369 DDD (DEGENERATIVE DISC DISEASE), LUMBAR: ICD-10-CM

## 2020-08-27 DIAGNOSIS — G89.4 CHRONIC PAIN SYNDROME: ICD-10-CM

## 2020-08-27 PROCEDURE — 99214 OFFICE O/P EST MOD 30 MIN: CPT | Performed by: PODIATRIST

## 2020-08-27 ASSESSMENT — PAIN SCALES - GENERAL: PAINLEVEL: NO PAIN (0)

## 2020-08-27 NOTE — LETTER
8/27/2020         RE: Joel Pineda  [address] [facility] [place] [address]        Dear Colleague,    Thank you for referring your patient, Joel Pineda, to the [facility]. Please see a copy of my visit note below.    S:  Patient complains of right foot pain.  Points to plantar second/third metatarsal head.  Describes as a burning pain.  aggravated by activity and relieved by rest.  Has had this for a few months.  No pain anywhere else on feet.  He is currently not working.  He wears a malleable shoe in the house.  He has been going to physical therapy for this.  Patient has ankylosing spondylitis, diabetes, peripheral neuropathy, and history of pulmonary embolism.  He also complains of pain in both of his nails again.  He points the medial border.  He had a temporary here in the past.  This worked for a while but now they are painful again.  He is wondering about a permanent fix for this.  Denies purulence or odor here.    ROS: See above       Allergies   Allergen Reactions     Amoxicillin-Pot Clavulanate Difficulty breathing     Banana Shortness Of Breath     Pt reports organic Banana is okay.      Nitroglycerin Palpitations     Penicillins Anaphylaxis     Provigil [Modafinil] Shortness Of Breath     headache     Gadolinium Hives and Itching     Patient was premedicated for the contrast allergy. He did still have a reaction a few hours after injection. Hives and itching. Dr. Gomez told tech to inform pt he should only have contrast again in the future when premedicated and at a hospital. Not at an outpatient facility.      Ketoconazole      Topical cream caused swelling and itching     Dye [Contrast Dye] Other (See Comments) and Hives     Moderate flushing, CT contrast     Golimumab      Hives, bradycardia, face swelling     Neurontin [Gabapentin] Hives     Moderate hives     Nortriptyline Hives     Varicella Virus Vaccine Live      Rash     Flagyl [Metronidazole Hcl] Palpitations  and Hives     Latex Rash     Metronidazole Palpitations, Other (See Comments) and Rash     dizziness (versus ciprofloxacin taken at same time)     No Clinical Screening - See Comments Rash     Nitrile gloves       Current Outpatient Medications   Medication Sig Dispense Refill     acetaminophen 500 MG CAPS Take 500 mg by mouth every 4 hours as needed 60 capsule      albuterol (PROAIR HFA/PROVENTIL HFA/VENTOLIN HFA) 108 (90 Base) MCG/ACT inhaler Inhale 2 puffs into the lungs every 4 hours as needed for shortness of breath / dyspnea or wheezing 8.5 g 11     albuterol (PROVENTIL) (2.5 MG/3ML) 0.083% neb solution Take 1 vial (2.5 mg) by nebulization every 6 hours as needed for shortness of breath / dyspnea or wheezing 200 mL 3     ALPRAZolam (XANAX) 0.5 MG tablet Take 1 tablet up to three times daily.       aspirin (ASA) 81 MG tablet Take 81 mg by mouth daily        cetirizine (ZYRTEC) 10 MG tablet Take 1 tablet (10 mg) by mouth At Bedtime 30 tablet 11     cholecalciferol (VITAMIN D3) 45655 units (1250 mcg) capsule Take one capsule every two weeks. 24 capsule 1     cyanocobalamin (CYANOCOBALAMIN) 1000 MCG/ML injection Inject 1 mL (1,000 mcg) into the muscle every 30 days (Patient taking differently: Inject 1 mL into the muscle every 30 days Taking this injection every 3 weeks per pt report) 10 mL 0     DULoxetine (CYMBALTA) 60 MG capsule Take 60 mg by mouth 2 times daily        EPINEPHrine (EPIPEN/ADRENACLICK/OR ANY BX GENERIC EQUIV) 0.3 MG/0.3ML injection 2-pack Inject 0.3 mLs (0.3 mg) into the muscle once as needed for anaphylaxis 0.6 mL 3     etanercept (ENBREL SURECLICK) 50 MG/ML autoinjector Inject 50 mg Subcutaneous once a week Hold for signs of infection, and seek medical attention. 4 mL 5     famotidine (PEPCID) 20 MG tablet Prior to administration of Humira every 2 weeks (Patient taking differently: Patient is taking this 1 time weekly.)       fluticasone (FLONASE) 50 MCG/ACT nasal spray Spray 1 spray into both  nostrils daily       fluticasone-salmeterol (ADVAIR) 500-50 MCG/DOSE inhaler Inhale 1 puff into the lungs every 12 hours 3 Inhaler 3     lamoTRIgine (LAMICTAL) 100 MG tablet Take 200 mg by mouth daily       levothyroxine (SYNTHROID/LEVOTHROID) 75 MCG tablet TAKE 1 TABLET EVERY MORNING 90 tablet 0     lidocaine (XYLOCAINE) 5 % external ointment Apply topically 4 times daily as needed (pain) 50 g 2     Liniments (SALONPAS EX) Externally apply 1 Application topically daily as needed       melatonin 3 MG tablet Take 6 mg by mouth nightly as needed for sleep       metFORMIN (GLUCOPHAGE-XR) 500 MG 24 hr tablet Take 2 tablets (1,000 mg) by mouth daily (with dinner) 180 tablet 0     metoclopramide (REGLAN) 5 MG tablet Take 1 tablet (5 mg) by mouth 3 times daily as needed (FOR NAUSEA) 90 tablet 1     metoprolol succinate ER (TOPROL-XL) 200 MG 24 hr tablet Take 1 tablet (200 mg) by mouth 2 times daily 180 tablet 0     montelukast (SINGULAIR) 10 MG tablet Take 1 tablet (10 mg) by mouth every evening 90 tablet 1     nabumetone (RELAFEN) 500 MG tablet Take 1-2 tablets (500-1,000 mg) by mouth 2 times daily (with meals) as needed for back/joint pain. 360 tablet 0     naloxone (NARCAN) 4 MG/0.1ML nasal spray Spray 1 spray (4 mg) into one nostril alternating nostrils as needed for opioid reversal every 2-3 minutes until assistance arrives 0.2 mL 0     omega-3 acid ethyl esters (LOVAZA) 1 g capsule TAKE 2 CAPSULES BY MOUTH TWICE DAILY. 360 capsule 2     omeprazole 20 MG tablet Take 20 mg by mouth daily        order for DME Equipment being ordered: Assure Compression stockings (ANNETTA) Large, closed toe, thigh-high 30-40 compression 2 Units 3     order for DME Jobst thigh high hose:  30-40 mmHg    Equipment being ordered: Jobst thigh high hose 30-40 mmHg 1 Units 1     order for DME Equipment being ordered: lumbosacral belt/brace 1 Units 0     order for DME Respironics REMSTAR 60 Series Auto CPAP 9-13 cm H2O, Wisp nasal mask w/a large  cushion and a chinstrap       oxyCODONE (ROXICODONE) 5 MG tablet Take 1-2 tablets (5-10 mg) by mouth every 6 hours as needed for pain (maximum 4 tablet(s) per day) 35 tablet 0     pregabalin (LYRICA) 150 MG capsule Take 1 capsule (150 mg) by mouth 3 times daily 270 capsule 1     QUEtiapine (SEROQUEL) 25 MG tablet Take 25 mg by mouth 4 times daily as needed Taking 50 MG @ HS per pt report       ramipril (ALTACE) 10 MG capsule Take 1 capsule (10 mg) by mouth daily 90 capsule 1     Rectal Protectant-Emollient (CALMOL-4) 76-10 % SUPP Place 1 suppository rectally 3 times daily       rizatriptan (MAXALT-MLT) 5 MG ODT Take 1 tablet (5 mg) by mouth at onset of headache for migraine 30 tablet 5     rosuvastatin (CRESTOR) 40 MG tablet Take 1 tablet (40 mg) by mouth daily 90 tablet 0     syringe, disposable, (BD TUBERCULIN SYRINGE) 1 ML MISC Equipment being ordered: 1 ml tuberculin syringes to be used for Vitamin B12 injections. 12 each 11     vitamin C (ASCORBIC ACID) 500 MG tablet Take 500 mg by mouth daily         Patient Active Problem List   Diagnosis     Major depressive disorder, recurrent episode (H)     Intermittent asthma     Hyperlipidemia LDL goal <100     Chronic nonallergic rhinitis     Diverticulosis     GERD (gastroesophageal reflux disease)     Anxiety     LLOYD (obstructive sleep apnea)- mild (AHI 11)     Intracranial arachnoid cyst     Facet arthritis of cervical region     Acquired hypothyroidism     Bipolar 2 disorder (H)     Chronic midline low back pain without sciatica     Irritable bowel syndrome with diarrhea     B12 deficiency     Essential hypertension with goal blood pressure less than 140/90     Chronic pain syndrome     Ankylosing spondylitis of sacral region (H)     Morbid obesity due to hypertriglyceridemia (H)     Fatty infiltration of liver     DDD (degenerative disc disease), lumbar     Type 2 diabetes mellitus with complication, without long-term current use of insulin (H)     Peripheral  polyneuropathy     History of pulmonary embolism     Ingrown toenail     Hypertriglyceridemia     Gastroparesis     Orthostatic dizziness     POTS (postural orthostatic tachycardia syndrome)     PHN (postherpetic neuralgia)     Dysautonomia (H)       Past Medical History:   Diagnosis Date     Acne      Acquired hypothyroidism      Allergic state      Ankylosing spondylitis lumbar region (H)      Ankylosing spondylitis of sacral region (H)      Anxiety      Bipolar 2 disorder (H)      Chest pain     Chest pain, regulated w/BP meds. Clear arteries.     Chronic pain      DDD (degenerative disc disease), lumbar      Depressive disorder      Diabetes (H)      Diverticulosis      Facet arthritis of cervical region      Gastroesophageal reflux disease      Hypertension      IBS (irritable bowel syndrome)      Intracranial arachnoid cyst      Polyneuropathy      Pulmonary embolism (H)      Skin exam, screening for cancer 12/3/2013     Sleep apnea      Uncomplicated asthma        Past Surgical History:   Procedure Laterality Date     BACK SURGERY  10/07    lumbar discectomy L5-S1     COLONOSCOPY      Note: colonoscopy scheduled with [facility] on Friday, 9/4/15     COSMETIC SURGERY  2012    Nose Exterior - functional     GI SURGERY  August 2013    Sigmoidectomy     HERNIA REPAIR, UMBILICAL  8/23/11    Dr. Evan whiting     INCISION AND DRAINAGE, ABSCESS, COMPLEX  8/23/11    umbilical, Dr. Evan Beavers     LAPAROSCOPIC ASSISTED COLECTOMY LEFT (DESCENDING)  8/15/2013    Procedure: LAPAROSCOPIC ASSISTED COLECTOMY LEFT (DESCENDING);  Laparoscopic Hand Assisted Sigmoid Resection, Mobilization of Splenic Fissure, coloproctoscopy, *Latex Free Room* Anesthesia General with Pain block  ;  Surgeon: Aurora Justice MD;  Location: [facility] OR     NERVE SURGERY  8/18/11    RF ablation @ L3-S1 @ [facility]     RECONSTRUCT NOSE AND SEPTUM (FUNCTIONAL)  10/14/2011    Procedure:RECONSTRUCT NOSE AND SEPTUM (FUNCTIONAL); Functional Septorhinoplasty,  "Turbinate Reduction, ; Surgeon:CEDRIC CUEVAS; Location:[facility] OR     SINUS SURGERY  10/1/01    ethmoidectomy chronic sinusitis       Family History   Problem Relation Age of Onset     Musculoskeletal Disorder Mother         back     Anxiety Disorder Mother      Colon Polyps Mother      Ulcerative Colitis Mother         and ischemic small intestine, surgery     Hypertension Mother      Breast Cancer Mother      Osteoporosis Mother      Diabetes Mother         Type 2, Diagnosed in 2014     Depression Mother         Takes Cymbalta to help with chronic pain + depx     Thyroid Disease Mother         Hypothyroidism     Obesity Mother         Under much better control latter half of 2015     Musculoskeletal Disorder Father         back     Substance Abuse Father      Hypertension Father      Hyperlipidemia Father      Depression Father         Off meds for many years. Seems \"ok\"     Heart Disease Maternal Grandmother      Heart Disease Maternal Grandfather      Psychotic Disorder Paternal Grandfather      Suicide Paternal Grandfather      Depression Paternal Grandfather         Pediatrician. Committed suicide by pistol in 1990.     Musculoskeletal Disorder Brother         back     Depression Brother         Expressed as anger and moodiness     Substance Abuse Brother      Substance Abuse Sister      Depression Sister         Mental Health Therapist, yet no anti-depressants?     Anxiety Disorder Sister         Mental Health Therapist, yet no anti-anxiety meds?     Other Cancer Other         Bladder Cancer - Fatal     Substance Abuse Brother      Colon Cancer No family hx of      Crohn's Disease No family hx of      Anesthesia Reaction No family hx of      Cancer No family hx of         No family history of skin cancer       Social History     Tobacco Use     Smoking status: Never Smoker     Smokeless tobacco: Never Used   Substance Use Topics     Alcohol use: Not Currently     Alcohol/week: 0.0 standard drinks     Drinks per " session: 1 or 2     Binge frequency: Weekly     Comment: occ 1/week         O:   /76   Pulse 88   Wt 146.1 kg (322 lb)   BMI 37.21 kg/m  .      Constitutional/ general:  Pt is in no apparent distress, appears well-nourished.  Cooperative with history and physical exam.     Psych:  The patient answered questions appropriately.  Normal affect.  Seems to have reasonable expectations, in terms of treatment.     Eyes:  Visual scanning/ tracking without deficit.     Ears:  Response to auditory stimuli is normal.  No hearing aid devices.  Auricles in proper alignment.     Lymphatic:  Popliteal lymph nodes not enlarged.     Lungs:  Non labored breathing, non labored speech. No cough.  No audible wheezing. Even, quiet breathing.       Vascular:  Pedal pulses are palpable bilaterally for both the DP and PT arteries.  CFT < 3 sec.  No edema.  Pedal hair growth noted.     Neuro:  Alert and oriented x 3. Coordinated gait.  Light touch sensation is intact to the L4, L5, S1 distributions. No obvious deficits.  No evidence of neurological-based weakness, spasticity, or contracture in the lower extremities.     Derm: Normal texture and turgor.  No erythema, ecchymosis, or cyanosis.  No open lesions.  Medial border both halluces nail ingrown.  Slight edema and erythema.  Minimal pain on palpation at this time.  No purulence or odor.    Musculoskeletal:    Lower extremity muscle strength is normal.  Patient is ambulatory without an assistive device or brace.  Normal arch with weightbearing.  No forefoot or rear foot deformities noted.  MS 5/5 all compartments.  Normal ROM all fore foot and rearfoot joints.  No equinus.  Pain under right second/third metatarsal head plantar.  Negative Lachmans test.  Negative Mulders click.  No pain anywhere else.  No erythema, edema, ecchymosis, or subcutaneous masses noted.      Radiographic Exam:   Long second/third metatarsal, but no other bone abnormalities.     A:  Sub second/third  capsulitis right foot        Bilateral hallux ingrown nail    P:  X rays personally reviewed right foot.  Discussed cause with patient.  Ice bid.  Instructed to wear stiff soles shoes at all times and I made suggestions.  Dispensed metatarsal pads to keep this offloaded.  Dispense carbon plates to offload this..  Avoid activities that bother this and discussed which activities will aggravate.  He will continue physical therapy.  We discussed a permanent nail removal on the medial border of both of his big nails.  Risk complications and efficacy discussed.  We discussed recovery.  He would like to go ahead with this.  We will set this up for a 45-minute appointment in the near future.  25 minutes spent in total time caring for this patient and at least 20 minutes spent  face to face with patient regarding care.        Peng Perez DPM DPM, FACFAS      Again, thank you for allowing me to participate in the care of your patient.        Sincerely,        Peng Perez DPM

## 2020-08-27 NOTE — PROGRESS NOTES
S:  Patient complains of right foot pain.  Points to plantar second/third metatarsal head.  Describes as a burning pain.  aggravated by activity and relieved by rest.  Has had this for a few months.  No pain anywhere else on feet.  He is currently not working.  He wears a malleable shoe in the house.  He has been going to physical therapy for this.  Patient has ankylosing spondylitis, diabetes, peripheral neuropathy, and history of pulmonary embolism.  He also complains of pain in both of his nails again.  He points the medial border.  He had a temporary here in the past.  This worked for a while but now they are painful again.  He is wondering about a permanent fix for this.  Denies purulence or odor here.    ROS: See above       Allergies   Allergen Reactions     Amoxicillin-Pot Clavulanate Difficulty breathing     Banana Shortness Of Breath     Pt reports organic Banana is okay.      Nitroglycerin Palpitations     Penicillins Anaphylaxis     Provigil [Modafinil] Shortness Of Breath     headache     Gadolinium Hives and Itching     Patient was premedicated for the contrast allergy. He did still have a reaction a few hours after injection. Hives and itching. Dr. Gomez told tech to inform pt he should only have contrast again in the future when premedicated and at a hospital. Not at an outpatient facility.      Ketoconazole      Topical cream caused swelling and itching     Dye [Contrast Dye] Other (See Comments) and Hives     Moderate flushing, CT contrast     Golimumab      Hives, bradycardia, face swelling     Neurontin [Gabapentin] Hives     Moderate hives     Nortriptyline Hives     Varicella Virus Vaccine Live      Rash     Flagyl [Metronidazole Hcl] Palpitations and Hives     Latex Rash     Metronidazole Palpitations, Other (See Comments) and Rash     dizziness (versus ciprofloxacin taken at same time)     No Clinical Screening - See Comments Rash     Nitrile gloves       Current Outpatient Medications    Medication Sig Dispense Refill     acetaminophen 500 MG CAPS Take 500 mg by mouth every 4 hours as needed 60 capsule      albuterol (PROAIR HFA/PROVENTIL HFA/VENTOLIN HFA) 108 (90 Base) MCG/ACT inhaler Inhale 2 puffs into the lungs every 4 hours as needed for shortness of breath / dyspnea or wheezing 8.5 g 11     albuterol (PROVENTIL) (2.5 MG/3ML) 0.083% neb solution Take 1 vial (2.5 mg) by nebulization every 6 hours as needed for shortness of breath / dyspnea or wheezing 200 mL 3     ALPRAZolam (XANAX) 0.5 MG tablet Take 1 tablet up to three times daily.       aspirin (ASA) 81 MG tablet Take 81 mg by mouth daily        cetirizine (ZYRTEC) 10 MG tablet Take 1 tablet (10 mg) by mouth At Bedtime 30 tablet 11     cholecalciferol (VITAMIN D3) 90357 units (1250 mcg) capsule Take one capsule every two weeks. 24 capsule 1     cyanocobalamin (CYANOCOBALAMIN) 1000 MCG/ML injection Inject 1 mL (1,000 mcg) into the muscle every 30 days (Patient taking differently: Inject 1 mL into the muscle every 30 days Taking this injection every 3 weeks per pt report) 10 mL 0     DULoxetine (CYMBALTA) 60 MG capsule Take 60 mg by mouth 2 times daily        EPINEPHrine (EPIPEN/ADRENACLICK/OR ANY BX GENERIC EQUIV) 0.3 MG/0.3ML injection 2-pack Inject 0.3 mLs (0.3 mg) into the muscle once as needed for anaphylaxis 0.6 mL 3     etanercept (ENBREL SURECLICK) 50 MG/ML autoinjector Inject 50 mg Subcutaneous once a week Hold for signs of infection, and seek medical attention. 4 mL 5     famotidine (PEPCID) 20 MG tablet Prior to administration of Humira every 2 weeks (Patient taking differently: Patient is taking this 1 time weekly.)       fluticasone (FLONASE) 50 MCG/ACT nasal spray Spray 1 spray into both nostrils daily       fluticasone-salmeterol (ADVAIR) 500-50 MCG/DOSE inhaler Inhale 1 puff into the lungs every 12 hours 3 Inhaler 3     lamoTRIgine (LAMICTAL) 100 MG tablet Take 200 mg by mouth daily       levothyroxine (SYNTHROID/LEVOTHROID)  75 MCG tablet TAKE 1 TABLET EVERY MORNING 90 tablet 0     lidocaine (XYLOCAINE) 5 % external ointment Apply topically 4 times daily as needed (pain) 50 g 2     Liniments (SALONPAS EX) Externally apply 1 Application topically daily as needed       melatonin 3 MG tablet Take 6 mg by mouth nightly as needed for sleep       metFORMIN (GLUCOPHAGE-XR) 500 MG 24 hr tablet Take 2 tablets (1,000 mg) by mouth daily (with dinner) 180 tablet 0     metoclopramide (REGLAN) 5 MG tablet Take 1 tablet (5 mg) by mouth 3 times daily as needed (FOR NAUSEA) 90 tablet 1     metoprolol succinate ER (TOPROL-XL) 200 MG 24 hr tablet Take 1 tablet (200 mg) by mouth 2 times daily 180 tablet 0     montelukast (SINGULAIR) 10 MG tablet Take 1 tablet (10 mg) by mouth every evening 90 tablet 1     nabumetone (RELAFEN) 500 MG tablet Take 1-2 tablets (500-1,000 mg) by mouth 2 times daily (with meals) as needed for back/joint pain. 360 tablet 0     naloxone (NARCAN) 4 MG/0.1ML nasal spray Spray 1 spray (4 mg) into one nostril alternating nostrils as needed for opioid reversal every 2-3 minutes until assistance arrives 0.2 mL 0     omega-3 acid ethyl esters (LOVAZA) 1 g capsule TAKE 2 CAPSULES BY MOUTH TWICE DAILY. 360 capsule 2     omeprazole 20 MG tablet Take 20 mg by mouth daily        order for DME Equipment being ordered: Assure Compression stockings (ANNETTA) Large, closed toe, thigh-high 30-40 compression 2 Units 3     order for DME Jobst thigh high hose:  30-40 mmHg    Equipment being ordered: Jobst thigh high hose 30-40 mmHg 1 Units 1     order for DME Equipment being ordered: lumbosacral belt/brace 1 Units 0     order for DME Respironics REMSTAR 60 Series Auto CPAP 9-13 cm H2O, Wisp nasal mask w/a large cushion and a chinstrap       oxyCODONE (ROXICODONE) 5 MG tablet Take 1-2 tablets (5-10 mg) by mouth every 6 hours as needed for pain (maximum 4 tablet(s) per day) 35 tablet 0     pregabalin (LYRICA) 150 MG capsule Take 1 capsule (150 mg) by mouth  3 times daily 270 capsule 1     QUEtiapine (SEROQUEL) 25 MG tablet Take 25 mg by mouth 4 times daily as needed Taking 50 MG @ HS per pt report       ramipril (ALTACE) 10 MG capsule Take 1 capsule (10 mg) by mouth daily 90 capsule 1     Rectal Protectant-Emollient (CALMOL-4) 76-10 % SUPP Place 1 suppository rectally 3 times daily       rizatriptan (MAXALT-MLT) 5 MG ODT Take 1 tablet (5 mg) by mouth at onset of headache for migraine 30 tablet 5     rosuvastatin (CRESTOR) 40 MG tablet Take 1 tablet (40 mg) by mouth daily 90 tablet 0     syringe, disposable, (BD TUBERCULIN SYRINGE) 1 ML MISC Equipment being ordered: 1 ml tuberculin syringes to be used for Vitamin B12 injections. 12 each 11     vitamin C (ASCORBIC ACID) 500 MG tablet Take 500 mg by mouth daily         Patient Active Problem List   Diagnosis     Major depressive disorder, recurrent episode (H)     Intermittent asthma     Hyperlipidemia LDL goal <100     Chronic nonallergic rhinitis     Diverticulosis     GERD (gastroesophageal reflux disease)     Anxiety     LLOYD (obstructive sleep apnea)- mild (AHI 11)     Intracranial arachnoid cyst     Facet arthritis of cervical region     Acquired hypothyroidism     Bipolar 2 disorder (H)     Chronic midline low back pain without sciatica     Irritable bowel syndrome with diarrhea     B12 deficiency     Essential hypertension with goal blood pressure less than 140/90     Chronic pain syndrome     Ankylosing spondylitis of sacral region (H)     Morbid obesity due to hypertriglyceridemia (H)     Fatty infiltration of liver     DDD (degenerative disc disease), lumbar     Type 2 diabetes mellitus with complication, without long-term current use of insulin (H)     Peripheral polyneuropathy     History of pulmonary embolism     Ingrown toenail     Hypertriglyceridemia     Gastroparesis     Orthostatic dizziness     POTS (postural orthostatic tachycardia syndrome)     PHN (postherpetic neuralgia)     Dysautonomia (H)        Past Medical History:   Diagnosis Date     Acne      Acquired hypothyroidism      Allergic state      Ankylosing spondylitis lumbar region (H)      Ankylosing spondylitis of sacral region (H)      Anxiety      Bipolar 2 disorder (H)      Chest pain     Chest pain, regulated w/BP meds. Clear arteries.     Chronic pain      DDD (degenerative disc disease), lumbar      Depressive disorder      Diabetes (H)      Diverticulosis      Facet arthritis of cervical region      Gastroesophageal reflux disease      Hypertension      IBS (irritable bowel syndrome)      Intracranial arachnoid cyst      Polyneuropathy      Pulmonary embolism (H)      Skin exam, screening for cancer 12/3/2013     Sleep apnea      Uncomplicated asthma        Past Surgical History:   Procedure Laterality Date     BACK SURGERY  10/07    lumbar discectomy L5-S1     COLONOSCOPY      Note: colonoscopy scheduled with [facility] on Friday, 9/4/15     COSMETIC SURGERY  2012    Nose Exterior - functional     GI SURGERY  August 2013    Sigmoidectomy     HERNIA REPAIR, UMBILICAL  8/23/11    small, Dr. Evan Beavers     INCISION AND DRAINAGE, ABSCESS, COMPLEX  8/23/11    umbilical, Dr. Evan Beavers     LAPAROSCOPIC ASSISTED COLECTOMY LEFT (DESCENDING)  8/15/2013    Procedure: LAPAROSCOPIC ASSISTED COLECTOMY LEFT (DESCENDING);  Laparoscopic Hand Assisted Sigmoid Resection, Mobilization of Splenic Fissure, coloproctoscopy, *Latex Free Room* Anesthesia General with Pain block  ;  Surgeon: Aurora Justice MD;  Location: [facility] OR     NERVE SURGERY  8/18/11    RF ablation @ L3-S1 @ [facility]     RECONSTRUCT NOSE AND SEPTUM (FUNCTIONAL)  10/14/2011    Procedure:RECONSTRUCT NOSE AND SEPTUM (FUNCTIONAL); Functional Septorhinoplasty, Turbinate Reduction, ; Surgeon:CEDRIC CUEVAS; Location:[facility] OR     SINUS SURGERY  10/1/01    ethmoidectomy chronic sinusitis       Family History   Problem Relation Age of Onset     Musculoskeletal Disorder Mother         back     Anxiety  "Disorder Mother      Colon Polyps Mother      Ulcerative Colitis Mother         and ischemic small intestine, surgery     Hypertension Mother      Breast Cancer Mother      Osteoporosis Mother      Diabetes Mother         Type 2, Diagnosed in 2014     Depression Mother         Takes Cymbalta to help with chronic pain + depx     Thyroid Disease Mother         Hypothyroidism     Obesity Mother         Under much better control latter half of 2015     Musculoskeletal Disorder Father         back     Substance Abuse Father      Hypertension Father      Hyperlipidemia Father      Depression Father         Off meds for many years. Seems \"ok\"     Heart Disease Maternal Grandmother      Heart Disease Maternal Grandfather      Psychotic Disorder Paternal Grandfather      Suicide Paternal Grandfather      Depression Paternal Grandfather         Pediatrician. Committed suicide by pistol in 1990.     Musculoskeletal Disorder Brother         back     Depression Brother         Expressed as anger and moodiness     Substance Abuse Brother      Substance Abuse Sister      Depression Sister         Mental Health Therapist, yet no anti-depressants?     Anxiety Disorder Sister         Mental Health Therapist, yet no anti-anxiety meds?     Other Cancer Other         Bladder Cancer - Fatal     Substance Abuse Brother      Colon Cancer No family hx of      Crohn's Disease No family hx of      Anesthesia Reaction No family hx of      Cancer No family hx of         No family history of skin cancer       Social History     Tobacco Use     Smoking status: Never Smoker     Smokeless tobacco: Never Used   Substance Use Topics     Alcohol use: Not Currently     Alcohol/week: 0.0 standard drinks     Drinks per session: 1 or 2     Binge frequency: Weekly     Comment: occ 1/week         O:   /76   Pulse 88   Wt 146.1 kg (322 lb)   BMI 37.21 kg/m  .      Constitutional/ general:  Pt is in no apparent distress, appears well-nourished.  " Cooperative with history and physical exam.     Psych:  The patient answered questions appropriately.  Normal affect.  Seems to have reasonable expectations, in terms of treatment.     Eyes:  Visual scanning/ tracking without deficit.     Ears:  Response to auditory stimuli is normal.  No hearing aid devices.  Auricles in proper alignment.     Lymphatic:  Popliteal lymph nodes not enlarged.     Lungs:  Non labored breathing, non labored speech. No cough.  No audible wheezing. Even, quiet breathing.       Vascular:  Pedal pulses are palpable bilaterally for both the DP and PT arteries.  CFT < 3 sec.  No edema.  Pedal hair growth noted.     Neuro:  Alert and oriented x 3. Coordinated gait.  Light touch sensation is intact to the L4, L5, S1 distributions. No obvious deficits.  No evidence of neurological-based weakness, spasticity, or contracture in the lower extremities.     Derm: Normal texture and turgor.  No erythema, ecchymosis, or cyanosis.  No open lesions.  Medial border both halluces nail ingrown.  Slight edema and erythema.  Minimal pain on palpation at this time.  No purulence or odor.    Musculoskeletal:    Lower extremity muscle strength is normal.  Patient is ambulatory without an assistive device or brace.  Normal arch with weightbearing.  No forefoot or rear foot deformities noted.  MS 5/5 all compartments.  Normal ROM all fore foot and rearfoot joints.  No equinus.  Pain under right second/third metatarsal head plantar.  Negative Lachmans test.  Negative Mulders click.  No pain anywhere else.  No erythema, edema, ecchymosis, or subcutaneous masses noted.      Radiographic Exam:   Long second/third metatarsal, but no other bone abnormalities.     A:  Sub second/third capsulitis right foot        Bilateral hallux ingrown nail    P:  X rays personally reviewed right foot.  Discussed cause with patient.  Ice bid.  Instructed to wear stiff soles shoes at all times and I made suggestions.  Dispensed  metatarsal pads to keep this offloaded.  Dispense carbon plates to offload this..  Avoid activities that bother this and discussed which activities will aggravate.  He will continue physical therapy.  We discussed a permanent nail removal on the medial border of both of his big nails.  Risk complications and efficacy discussed.  We discussed recovery.  He would like to go ahead with this.  We will set this up for a 45-minute appointment in the near future.  25 minutes spent in total time caring for this patient and at least 20 minutes spent  face to face with patient regarding care.        Peng Perez, LIAN DPM, FACFAS

## 2020-08-28 RX ORDER — OXYCODONE HYDROCHLORIDE 5 MG/1
5-10 TABLET ORAL EVERY 6 HOURS PRN
Qty: 35 TABLET | Refills: 0 | Status: SHIPPED | OUTPATIENT
Start: 2020-08-28 | End: 2020-09-17

## 2020-08-28 NOTE — TELEPHONE ENCOUNTER
Patient also sent the following [employer] message:  Patient Comment: 15 tabs remain. Anticipatory pain mgmt for Sep 03 bilateral partial large toenail excision from nailbed, followed by chemical application to stop nail sections from regrowing. Thx      Controlled Substance Refill Request for Oxycodone  Problem List Complete:  Yes  Chronic pain syndrome   Problem Detail     Noted:  4/4/2017    Priority:  Medium    Overview Addendum 8/28/2020  9:30 AM by Martell Chaves RN    Patient is followed by Ksenia Lyles MD for ongoing prescription of pain medication.  All refills should only be approved by this provider, or covering partner.     Medication(s): oxy 5.   Maximum quantity per month: 40  Clinic visit frequency required: Q3  months      Controlled substance agreement: 6/23/2020  UDS: Aug 2019     Encounter-Level CSA - 04/04/2017:            Controlled Substance Agreement - Scan on 4/11/2017  1:30 PM : CONTROLLED SUBSTANCE AGREEMENT (below)                   Pain Clinic evaluation in the past: Yes     DIRE Total Score(s):  No flowsheet data found.     Last [place] website verification:  08/28/20    [email or web address]     checked in past 3 months?  Yes 8/28/20   RX [place] ([place]) reviewed:  reviewed- no concerns  [place] profile:  [email or web address]  Last Written Prescription Date:  8/14/20  Last Fill Quantity: 35 tablets,  # refills: 0   Last office visit: 8/18/2020 with prescribing provider:  8/18/20   Future Office Visit:   Next 5 appointments (look out 90 days)    Sep 03, 2020 11:45 AM CDT  [facility] Peng Perez DPM  [facility] [address] [place] [address]  [place] [address]  [phone]   Nov 20, 2020  9:00 AM CST  Return Visit with Oneil Baxter MD  [facility] [address] [place] [address]  [place] [address]  [phone]               Martell Chaves RN, BSN, PHN

## 2020-08-30 ENCOUNTER — MYC REFILL (OUTPATIENT)
Dept: FAMILY MEDICINE | Facility: CLINIC | Age: 45
End: 2020-08-30

## 2020-08-30 DIAGNOSIS — I10 ESSENTIAL HYPERTENSION WITH GOAL BLOOD PRESSURE LESS THAN 140/90: Chronic | ICD-10-CM

## 2020-09-01 ENCOUNTER — MYC REFILL (OUTPATIENT)
Dept: FAMILY MEDICINE | Facility: CLINIC | Age: 45
End: 2020-09-01

## 2020-09-01 DIAGNOSIS — M51.369 DDD (DEGENERATIVE DISC DISEASE), LUMBAR: ICD-10-CM

## 2020-09-01 DIAGNOSIS — M45.8 ANKYLOSING SPONDYLITIS OF SACRAL REGION (H): ICD-10-CM

## 2020-09-02 RX ORDER — RAMIPRIL 10 MG/1
10 CAPSULE ORAL DAILY
Qty: 90 CAPSULE | Refills: 1 | Status: SHIPPED | OUTPATIENT
Start: 2020-09-02 | End: 2021-02-26

## 2020-09-02 NOTE — TELEPHONE ENCOUNTER
Prescription approved per [facility] Refill Protocol.    Sapna Barragan RN, [facility] Triage

## 2020-09-03 ENCOUNTER — OFFICE VISIT (OUTPATIENT)
Dept: PODIATRY | Facility: CLINIC | Age: 45
End: 2020-09-03
Payer: MEDICARE

## 2020-09-03 VITALS
SYSTOLIC BLOOD PRESSURE: 120 MMHG | HEART RATE: 90 BPM | DIASTOLIC BLOOD PRESSURE: 80 MMHG | BODY MASS INDEX: 37.21 KG/M2 | WEIGHT: 315 LBS

## 2020-09-03 DIAGNOSIS — L60.0 INGROWN TOENAIL: Primary | ICD-10-CM

## 2020-09-03 DIAGNOSIS — M77.8 CAPSULITIS OF FOOT, RIGHT: ICD-10-CM

## 2020-09-03 PROCEDURE — 99213 OFFICE O/P EST LOW 20 MIN: CPT | Mod: 25 | Performed by: PODIATRIST

## 2020-09-03 PROCEDURE — 11750 EXCISION NAIL&NAIL MATRIX: CPT | Mod: 51 | Performed by: PODIATRIST

## 2020-09-03 RX ORDER — NABUMETONE 500 MG/1
500-1000 TABLET, FILM COATED ORAL 2 TIMES DAILY WITH MEALS
Qty: 360 TABLET | Refills: 0 | Status: SHIPPED | OUTPATIENT
Start: 2020-09-03 | End: 2020-11-23

## 2020-09-03 NOTE — TELEPHONE ENCOUNTER
"Routing refill request to provider for review/approval because:  Labs out of range:  AST      Requested Prescriptions   Pending Prescriptions Disp Refills     nabumetone (RELAFEN) 500 MG tablet 360 tablet 0     Sig: Take 1-2 tablets (500-1,000 mg) by mouth 2 times daily (with meals) as needed for back/joint pain.       NSAID Medications Failed - 9/2/2020  7:00 AM        Failed - Normal AST on file in past 12 months     Recent Labs   Lab Test 01/24/20  1637   AST 60*             Passed - Blood pressure under 140/90 in past 12 months     BP Readings from Last 3 Encounters:   08/27/20 110/76   08/18/20 119/76   06/23/20 (!) 132/92                 Passed - Normal ALT on file in past 12 months     Recent Labs   Lab Test 01/24/20  1637   ALT 61             Passed - Recent (12 mo) or future (30 days) visit within the authorizing provider's specialty     Patient has had an office visit with the authorizing provider or a provider within the authorizing providers department within the previous 12 mos or has a future within next 30 days. See \"Patient Info\" tab in inbasket, or \"Choose Columns\" in Meds & Orders section of the refill encounter.              Passed - Patient is age 6-64 years        Passed - Normal CBC on file in past 12 months     Recent Labs   Lab Test 12/27/19  1209   WBC 8.0   RBC 5.20   HGB 14.9   HCT 45.0                    Passed - Medication is active on med list        Passed - Normal serum creatinine on file in past 12 months     Recent Labs   Lab Test 07/03/20  1251   CR 0.84       Ok to refill medication if creatinine is low                 Martell Chaves RN, BSN, PHN    "

## 2020-09-03 NOTE — PATIENT INSTRUCTIONS
We wish you continued good healing. If you have any questions or concerns, please do not hesitate to contact us at [phone]    Please remember to call and schedule a follow up appointment if one was recommended at your earliest convenience.   PODIATRY CLINIC HOURS  TELEPHONE NUMBER    Dr. Peng Perez D.P.M [facility]    Clinics:  [facility]    [facility]   Tuesday 1PM-6PM  [place]  Wednesday 7AM-2PM  [facility]  Thursday 10AM-6PM  [place]  Friday 7AM-3PM  [place]  Specialty schedulers:   [phone] to make an appointment with any Specialty Provider.        Urgent Care locations:    [facility] Monday-Friday 5 pm - 9 pm. Saturday-Sunday 9 am -5pm    Monday-Friday 11 am - 9 pm Saturday 9 am - 5 pm     Monday-Sunday 12 noon-8PM [phone]     If you need a medication refill, please contact us you may need lab work and/or a follow up visit prior to your refill (i.e. Antifungal medications).    [facility] (secure e-mail communication and access to your chart) to send a message or to make an appointment.    If MRI needed please call [place] at [phone]

## 2020-09-03 NOTE — PROGRESS NOTES
Subjective:    Pt is seen today for ingrown great toenail.  Points to right and left first toe medial border(s).   Has had a temporary in the past.  Would like a permanent today.  Denies F/C/N/V.  Patient says his right subsecond and third capsulitis is much better.  The metatarsal pads have helped.  He is also using the carbon plates.  He notes no erythema blistering or edema here.      ROS:  No hx of wound healing problems, or numbness       Allergies   Allergen Reactions     Amoxicillin-Pot Clavulanate Difficulty breathing     Banana Shortness Of Breath     Pt reports organic Banana is okay.      Nitroglycerin Palpitations     Penicillins Anaphylaxis     Provigil [Modafinil] Shortness Of Breath     headache     Gadolinium Hives and Itching     Patient was premedicated for the contrast allergy. He did still have a reaction a few hours after injection. Hives and itching. Dr. Gomez told tech to inform pt he should only have contrast again in the future when premedicated and at a hospital. Not at an outpatient facility.      Ketoconazole      Topical cream caused swelling and itching     Dye [Contrast Dye] Other (See Comments) and Hives     Moderate flushing, CT contrast     Golimumab      Hives, bradycardia, face swelling     Neurontin [Gabapentin] Hives     Moderate hives     Nortriptyline Hives     Varicella Virus Vaccine Live      Rash     Flagyl [Metronidazole Hcl] Palpitations and Hives     Latex Rash     Metronidazole Palpitations, Other (See Comments) and Rash     dizziness (versus ciprofloxacin taken at same time)     No Clinical Screening - See Comments Rash     Nitrile gloves       Current Outpatient Medications   Medication Sig Dispense Refill     acetaminophen 500 MG CAPS Take 500 mg by mouth every 4 hours as needed 60 capsule      albuterol (PROAIR HFA/PROVENTIL HFA/VENTOLIN HFA) 108 (90 Base) MCG/ACT inhaler Inhale 2 puffs into the lungs every 4 hours as needed for shortness of breath / dyspnea  or wheezing 8.5 g 11     albuterol (PROVENTIL) (2.5 MG/3ML) 0.083% neb solution Take 1 vial (2.5 mg) by nebulization every 6 hours as needed for shortness of breath / dyspnea or wheezing 200 mL 3     ALPRAZolam (XANAX) 0.5 MG tablet Take 1 tablet up to three times daily.       aspirin (ASA) 81 MG tablet Take 81 mg by mouth daily        cetirizine (ZYRTEC) 10 MG tablet Take 1 tablet (10 mg) by mouth At Bedtime 30 tablet 11     cholecalciferol (VITAMIN D3) 00675 units (1250 mcg) capsule Take one capsule every two weeks. 24 capsule 1     cyanocobalamin (CYANOCOBALAMIN) 1000 MCG/ML injection Inject 1 mL (1,000 mcg) into the muscle every 30 days (Patient taking differently: Inject 1 mL into the muscle every 30 days Taking this injection every 3 weeks per pt report) 10 mL 0     DULoxetine (CYMBALTA) 60 MG capsule Take 60 mg by mouth 2 times daily        EPINEPHrine (EPIPEN/ADRENACLICK/OR ANY BX GENERIC EQUIV) 0.3 MG/0.3ML injection 2-pack Inject 0.3 mLs (0.3 mg) into the muscle once as needed for anaphylaxis 0.6 mL 3     etanercept (ENBREL SURECLICK) 50 MG/ML autoinjector Inject 50 mg Subcutaneous once a week Hold for signs of infection, and seek medical attention. 4 mL 5     famotidine (PEPCID) 20 MG tablet Prior to administration of Humira every 2 weeks (Patient taking differently: Patient is taking this 1 time weekly.)       fluticasone (FLONASE) 50 MCG/ACT nasal spray Spray 1 spray into both nostrils daily       fluticasone-salmeterol (ADVAIR) 500-50 MCG/DOSE inhaler Inhale 1 puff into the lungs every 12 hours 3 Inhaler 3     lamoTRIgine (LAMICTAL) 100 MG tablet Take 200 mg by mouth daily       levothyroxine (SYNTHROID/LEVOTHROID) 75 MCG tablet TAKE 1 TABLET EVERY MORNING 90 tablet 0     lidocaine (XYLOCAINE) 5 % external ointment Apply topically 4 times daily as needed (pain) 50 g 2     Liniments (SALONPAS EX) Externally apply 1 Application topically daily as needed       melatonin 3 MG tablet Take 6 mg by mouth  nightly as needed for sleep       metFORMIN (GLUCOPHAGE-XR) 500 MG 24 hr tablet Take 2 tablets (1,000 mg) by mouth daily (with dinner) 180 tablet 0     metoclopramide (REGLAN) 5 MG tablet Take 1 tablet (5 mg) by mouth 3 times daily as needed (FOR NAUSEA) 90 tablet 1     metoprolol succinate ER (TOPROL-XL) 200 MG 24 hr tablet Take 1 tablet (200 mg) by mouth 2 times daily 180 tablet 0     montelukast (SINGULAIR) 10 MG tablet Take 1 tablet (10 mg) by mouth every evening 90 tablet 1     nabumetone (RELAFEN) 500 MG tablet Take 1-2 tablets (500-1,000 mg) by mouth 2 times daily (with meals) as needed for back/joint pain. 360 tablet 0     naloxone (NARCAN) 4 MG/0.1ML nasal spray Spray 1 spray (4 mg) into one nostril alternating nostrils as needed for opioid reversal every 2-3 minutes until assistance arrives 0.2 mL 0     omega-3 acid ethyl esters (LOVAZA) 1 g capsule TAKE 2 CAPSULES BY MOUTH TWICE DAILY. 360 capsule 2     omeprazole 20 MG tablet Take 20 mg by mouth daily        order for DME Equipment being ordered: Assure Compression stockings (ANNETTA) Large, closed toe, thigh-high 30-40 compression 2 Units 3     order for DME Jobst thigh high hose:  30-40 mmHg    Equipment being ordered: Jobst thigh high hose 30-40 mmHg 1 Units 1     order for DME Equipment being ordered: lumbosacral belt/brace 1 Units 0     order for DME Respironics REMSTAR 60 Series Auto CPAP 9-13 cm H2O, Wisp nasal mask w/a large cushion and a chinstrap       oxyCODONE (ROXICODONE) 5 MG tablet Take 1-2 tablets (5-10 mg) by mouth every 6 hours as needed for pain (maximum 4 tablet(s) per day) 35 tablet 0     pregabalin (LYRICA) 150 MG capsule Take 1 capsule (150 mg) by mouth 3 times daily 270 capsule 1     QUEtiapine (SEROQUEL) 25 MG tablet Take 25 mg by mouth 4 times daily as needed Taking 50 MG @ HS per pt report       ramipril (ALTACE) 10 MG capsule Take 1 capsule (10 mg) by mouth daily 90 capsule 1     Rectal Protectant-Emollient (CALMOL-4) 76-10 % SUPP  Place 1 suppository rectally 3 times daily       rizatriptan (MAXALT-MLT) 5 MG ODT Take 1 tablet (5 mg) by mouth at onset of headache for migraine 30 tablet 5     rosuvastatin (CRESTOR) 40 MG tablet Take 1 tablet (40 mg) by mouth daily 90 tablet 0     syringe, disposable, (BD TUBERCULIN SYRINGE) 1 ML MISC Equipment being ordered: 1 ml tuberculin syringes to be used for Vitamin B12 injections. 12 each 11     vitamin C (ASCORBIC ACID) 500 MG tablet Take 500 mg by mouth daily         Patient Active Problem List   Diagnosis     Major depressive disorder, recurrent episode (H)     Intermittent asthma     Hyperlipidemia LDL goal <100     Chronic nonallergic rhinitis     Diverticulosis     GERD (gastroesophageal reflux disease)     Anxiety     LLOYD (obstructive sleep apnea)- mild (AHI 11)     Intracranial arachnoid cyst     Facet arthritis of cervical region     Acquired hypothyroidism     Bipolar 2 disorder (H)     Chronic midline low back pain without sciatica     Irritable bowel syndrome with diarrhea     B12 deficiency     Essential hypertension with goal blood pressure less than 140/90     Chronic pain syndrome     Ankylosing spondylitis of sacral region (H)     Morbid obesity due to hypertriglyceridemia (H)     Fatty infiltration of liver     DDD (degenerative disc disease), lumbar     Type 2 diabetes mellitus with complication, without long-term current use of insulin (H)     Peripheral polyneuropathy     History of pulmonary embolism     Ingrown toenail     Hypertriglyceridemia     Gastroparesis     Orthostatic dizziness     POTS (postural orthostatic tachycardia syndrome)     PHN (postherpetic neuralgia)     Dysautonomia (H)       Past Medical History:   Diagnosis Date     Acne      Acquired hypothyroidism      Allergic state      Ankylosing spondylitis lumbar region (H)      Ankylosing spondylitis of sacral region (H)      Anxiety      Bipolar 2 disorder (H)      Chest pain     Chest pain, regulated w/BP meds.  Clear arteries.     Chronic pain      DDD (degenerative disc disease), lumbar      Depressive disorder      Diabetes (H)      Diverticulosis      Facet arthritis of cervical region      Gastroesophageal reflux disease      Hypertension      IBS (irritable bowel syndrome)      Intracranial arachnoid cyst      Polyneuropathy      Pulmonary embolism (H)      Skin exam, screening for cancer 12/3/2013     Sleep apnea      Uncomplicated asthma        Past Surgical History:   Procedure Laterality Date     BACK SURGERY  10/07    lumbar discectomy L5-S1     COLONOSCOPY      Note: colonoscopy scheduled with [facility] on Friday, 9/4/15     COSMETIC SURGERY  2012    Nose Exterior - functional     GI SURGERY  August 2013    Sigmoidectomy     HERNIA REPAIR, UMBILICAL  8/23/11    Dr. Evan whiting     INCISION AND DRAINAGE, ABSCESS, COMPLEX  8/23/11    umbilical, Dr. Evan Beavers     LAPAROSCOPIC ASSISTED COLECTOMY LEFT (DESCENDING)  8/15/2013    Procedure: LAPAROSCOPIC ASSISTED COLECTOMY LEFT (DESCENDING);  Laparoscopic Hand Assisted Sigmoid Resection, Mobilization of Splenic Fissure, coloproctoscopy, *Latex Free Room* Anesthesia General with Pain block  ;  Surgeon: Aurora Justice MD;  Location: [facility] OR     NERVE SURGERY  8/18/11    RF ablation @ L3-S1 @ [facility]     RECONSTRUCT NOSE AND SEPTUM (FUNCTIONAL)  10/14/2011    Procedure:RECONSTRUCT NOSE AND SEPTUM (FUNCTIONAL); Functional Septorhinoplasty, Turbinate Reduction, ; Surgeon:CEDRIC CUEVAS; Location:[facility] OR     SINUS SURGERY  10/1/01    ethmoidectomy chronic sinusitis       Family History   Problem Relation Age of Onset     Musculoskeletal Disorder Mother         back     Anxiety Disorder Mother      Colon Polyps Mother      Ulcerative Colitis Mother         and ischemic small intestine, surgery     Hypertension Mother      Breast Cancer Mother      Osteoporosis Mother      Diabetes Mother         Type 2, Diagnosed in 2014     Depression Mother         Takes Cymbalta to  "help with chronic pain + depx     Thyroid Disease Mother         Hypothyroidism     Obesity Mother         Under much better control latter half of 2015     Musculoskeletal Disorder Father         back     Substance Abuse Father      Hypertension Father      Hyperlipidemia Father      Depression Father         Off meds for many years. Seems \"ok\"     Heart Disease Maternal Grandmother      Heart Disease Maternal Grandfather      Psychotic Disorder Paternal Grandfather      Suicide Paternal Grandfather      Depression Paternal Grandfather         Pediatrician. Committed suicide by pistol in 1990.     Musculoskeletal Disorder Brother         back     Depression Brother         Expressed as anger and moodiness     Substance Abuse Brother      Substance Abuse Sister      Depression Sister         Mental Health Therapist, yet no anti-depressants?     Anxiety Disorder Sister         Mental Health Therapist, yet no anti-anxiety meds?     Other Cancer Other         Bladder Cancer - Fatal     Substance Abuse Brother      Colon Cancer No family hx of      Crohn's Disease No family hx of      Anesthesia Reaction No family hx of      Cancer No family hx of         No family history of skin cancer       Social History     Tobacco Use     Smoking status: Never Smoker     Smokeless tobacco: Never Used   Substance Use Topics     Alcohol use: Not Currently     Alcohol/week: 0.0 standard drinks     Drinks per session: 1 or 2     Binge frequency: Weekly     Comment: occ 1/week         Objective:    /80   Pulse 90   Wt 146.1 kg (322 lb)   BMI 37.21 kg/m    Pulses are palpable +2/4 DP & PT bilateral.  Sensation to light touch is intact.  No fore foot deformities are noted.  Normal ROM all forefoot joints.  Today very minimal pain under the right second and third metatarsal head.  Negative Lockman's test.  Negative Amando's click.  No pain with range of motion.  No erythema edema or ecchymosis noted.  No evidence of deep abscess " or infection noted.  Pain on palpation of right and left first toe medial borders.  Erythema, edema noted.  Nail appears to be incurvated on the affected border.     Assessment:  Onychocryptosis with paronychia right and left first toe medial border(s)                          Right foot subsecond and third capsulitis    Plan:  Discussed etiology and treatment options with the pt.  The potential causes and nature of an ingrown toenail were discussed with the patient.  We reviewed the natural history/prognosis of the condition and potential risks if no treatment is provided.      Treatment options discussed included conservative management (oral antibiotics, soaking of foot, adequate width shoes)  as well as surgical management (partial or total nail removal).  The pros and cons of both forms of treatment were reviewed.  Handout dispensed.       After thorough discussion and answering all questions, the patient elected to have permanent removal of right and left first toe medial borders.  Risk complications and efficacy discussed with patient.  Obtained consent, used 3cc of 1/4% marcaine plain to block aformentioned toe(s).  Sterile prep, then avulsed right and left first toe medial border(s).  No evidence of deep abscess noted.  Phenol was applied times 3 at 30 second intervals with curettage in between and then alcohol rinse.  Pt tolerated procedure well.  Sterile bandage placed, gave wound care instruction.      Discussed with patient right subsecond and third capsulitis significantly better.  Continue good stiff shoes and metatarsal pads.  He will not wear malleable shoes.  Return to clinic prn.    Peng Perez DPM DPM, FACFAS

## 2020-09-03 NOTE — LETTER
9/3/2020         RE: Joel Pineda  [address] [place] [address]        Dear Colleague,    Thank you for referring your patient, Joel Pineda, to the [facility]. Please see a copy of my visit note below.    Subjective:    Pt is seen today for ingrown great toenail.  Points to right and left first toe medial border(s).   Has had a temporary in the past.  Would like a permanent today.  Denies F/C/N/V.  Patient says his right subsecond and third capsulitis is much better.  The metatarsal pads have helped.  He is also using the carbon plates.  He notes no erythema blistering or edema here.      ROS:  No hx of wound healing problems, or numbness       Allergies   Allergen Reactions     Amoxicillin-Pot Clavulanate Difficulty breathing     Banana Shortness Of Breath     Pt reports organic Banana is okay.      Nitroglycerin Palpitations     Penicillins Anaphylaxis     Provigil [Modafinil] Shortness Of Breath     headache     Gadolinium Hives and Itching     Patient was premedicated for the contrast allergy. He did still have a reaction a few hours after injection. Hives and itching. Dr. Gomez told tech to inform pt he should only have contrast again in the future when premedicated and at a hospital. Not at an outpatient facility.      Ketoconazole      Topical cream caused swelling and itching     Dye [Contrast Dye] Other (See Comments) and Hives     Moderate flushing, CT contrast     Golimumab      Hives, bradycardia, face swelling     Neurontin [Gabapentin] Hives     Moderate hives     Nortriptyline Hives     Varicella Virus Vaccine Live      Rash     Flagyl [Metronidazole Hcl] Palpitations and Hives     Latex Rash     Metronidazole Palpitations, Other (See Comments) and Rash     dizziness (versus ciprofloxacin taken at same time)     No Clinical Screening - See Comments Rash     Nitrile gloves       Current Outpatient Medications   Medication Sig Dispense Refill      acetaminophen 500 MG CAPS Take 500 mg by mouth every 4 hours as needed 60 capsule      albuterol (PROAIR HFA/PROVENTIL HFA/VENTOLIN HFA) 108 (90 Base) MCG/ACT inhaler Inhale 2 puffs into the lungs every 4 hours as needed for shortness of breath / dyspnea or wheezing 8.5 g 11     albuterol (PROVENTIL) (2.5 MG/3ML) 0.083% neb solution Take 1 vial (2.5 mg) by nebulization every 6 hours as needed for shortness of breath / dyspnea or wheezing 200 mL 3     ALPRAZolam (XANAX) 0.5 MG tablet Take 1 tablet up to three times daily.       aspirin (ASA) 81 MG tablet Take 81 mg by mouth daily        cetirizine (ZYRTEC) 10 MG tablet Take 1 tablet (10 mg) by mouth At Bedtime 30 tablet 11     cholecalciferol (VITAMIN D3) 69250 units (1250 mcg) capsule Take one capsule every two weeks. 24 capsule 1     cyanocobalamin (CYANOCOBALAMIN) 1000 MCG/ML injection Inject 1 mL (1,000 mcg) into the muscle every 30 days (Patient taking differently: Inject 1 mL into the muscle every 30 days Taking this injection every 3 weeks per pt report) 10 mL 0     DULoxetine (CYMBALTA) 60 MG capsule Take 60 mg by mouth 2 times daily        EPINEPHrine (EPIPEN/ADRENACLICK/OR ANY BX GENERIC EQUIV) 0.3 MG/0.3ML injection 2-pack Inject 0.3 mLs (0.3 mg) into the muscle once as needed for anaphylaxis 0.6 mL 3     etanercept (ENBREL SURECLICK) 50 MG/ML autoinjector Inject 50 mg Subcutaneous once a week Hold for signs of infection, and seek medical attention. 4 mL 5     famotidine (PEPCID) 20 MG tablet Prior to administration of Humira every 2 weeks (Patient taking differently: Patient is taking this 1 time weekly.)       fluticasone (FLONASE) 50 MCG/ACT nasal spray Spray 1 spray into both nostrils daily       fluticasone-salmeterol (ADVAIR) 500-50 MCG/DOSE inhaler Inhale 1 puff into the lungs every 12 hours 3 Inhaler 3     lamoTRIgine (LAMICTAL) 100 MG tablet Take 200 mg by mouth daily       levothyroxine (SYNTHROID/LEVOTHROID) 75 MCG tablet TAKE 1 TABLET EVERY  MORNING 90 tablet 0     lidocaine (XYLOCAINE) 5 % external ointment Apply topically 4 times daily as needed (pain) 50 g 2     Liniments (SALONPAS EX) Externally apply 1 Application topically daily as needed       melatonin 3 MG tablet Take 6 mg by mouth nightly as needed for sleep       metFORMIN (GLUCOPHAGE-XR) 500 MG 24 hr tablet Take 2 tablets (1,000 mg) by mouth daily (with dinner) 180 tablet 0     metoclopramide (REGLAN) 5 MG tablet Take 1 tablet (5 mg) by mouth 3 times daily as needed (FOR NAUSEA) 90 tablet 1     metoprolol succinate ER (TOPROL-XL) 200 MG 24 hr tablet Take 1 tablet (200 mg) by mouth 2 times daily 180 tablet 0     montelukast (SINGULAIR) 10 MG tablet Take 1 tablet (10 mg) by mouth every evening 90 tablet 1     nabumetone (RELAFEN) 500 MG tablet Take 1-2 tablets (500-1,000 mg) by mouth 2 times daily (with meals) as needed for back/joint pain. 360 tablet 0     naloxone (NARCAN) 4 MG/0.1ML nasal spray Spray 1 spray (4 mg) into one nostril alternating nostrils as needed for opioid reversal every 2-3 minutes until assistance arrives 0.2 mL 0     omega-3 acid ethyl esters (LOVAZA) 1 g capsule TAKE 2 CAPSULES BY MOUTH TWICE DAILY. 360 capsule 2     omeprazole 20 MG tablet Take 20 mg by mouth daily        order for DME Equipment being ordered: Assure Compression stockings (ANNETTA) Large, closed toe, thigh-high 30-40 compression 2 Units 3     order for DME Jobst thigh high hose:  30-40 mmHg    Equipment being ordered: Jobst thigh high hose 30-40 mmHg 1 Units 1     order for DME Equipment being ordered: lumbosacral belt/brace 1 Units 0     order for DME Respironics REMSTAR 60 Series Auto CPAP 9-13 cm H2O, Wisp nasal mask w/a large cushion and a chinstrap       oxyCODONE (ROXICODONE) 5 MG tablet Take 1-2 tablets (5-10 mg) by mouth every 6 hours as needed for pain (maximum 4 tablet(s) per day) 35 tablet 0     pregabalin (LYRICA) 150 MG capsule Take 1 capsule (150 mg) by mouth 3 times daily 270 capsule 1      QUEtiapine (SEROQUEL) 25 MG tablet Take 25 mg by mouth 4 times daily as needed Taking 50 MG @ HS per pt report       ramipril (ALTACE) 10 MG capsule Take 1 capsule (10 mg) by mouth daily 90 capsule 1     Rectal Protectant-Emollient (CALMOL-4) 76-10 % SUPP Place 1 suppository rectally 3 times daily       rizatriptan (MAXALT-MLT) 5 MG ODT Take 1 tablet (5 mg) by mouth at onset of headache for migraine 30 tablet 5     rosuvastatin (CRESTOR) 40 MG tablet Take 1 tablet (40 mg) by mouth daily 90 tablet 0     syringe, disposable, (BD TUBERCULIN SYRINGE) 1 ML MISC Equipment being ordered: 1 ml tuberculin syringes to be used for Vitamin B12 injections. 12 each 11     vitamin C (ASCORBIC ACID) 500 MG tablet Take 500 mg by mouth daily         Patient Active Problem List   Diagnosis     Major depressive disorder, recurrent episode (H)     Intermittent asthma     Hyperlipidemia LDL goal <100     Chronic nonallergic rhinitis     Diverticulosis     GERD (gastroesophageal reflux disease)     Anxiety     LLOYD (obstructive sleep apnea)- mild (AHI 11)     Intracranial arachnoid cyst     Facet arthritis of cervical region     Acquired hypothyroidism     Bipolar 2 disorder (H)     Chronic midline low back pain without sciatica     Irritable bowel syndrome with diarrhea     B12 deficiency     Essential hypertension with goal blood pressure less than 140/90     Chronic pain syndrome     Ankylosing spondylitis of sacral region (H)     Morbid obesity due to hypertriglyceridemia (H)     Fatty infiltration of liver     DDD (degenerative disc disease), lumbar     Type 2 diabetes mellitus with complication, without long-term current use of insulin (H)     Peripheral polyneuropathy     History of pulmonary embolism     Ingrown toenail     Hypertriglyceridemia     Gastroparesis     Orthostatic dizziness     POTS (postural orthostatic tachycardia syndrome)     PHN (postherpetic neuralgia)     Dysautonomia (H)       Past Medical History:   Diagnosis  Date     Acne      Acquired hypothyroidism      Allergic state      Ankylosing spondylitis lumbar region (H)      Ankylosing spondylitis of sacral region (H)      Anxiety      Bipolar 2 disorder (H)      Chest pain     Chest pain, regulated w/BP meds. Clear arteries.     Chronic pain      DDD (degenerative disc disease), lumbar      Depressive disorder      Diabetes (H)      Diverticulosis      Facet arthritis of cervical region      Gastroesophageal reflux disease      Hypertension      IBS (irritable bowel syndrome)      Intracranial arachnoid cyst      Polyneuropathy      Pulmonary embolism (H)      Skin exam, screening for cancer 12/3/2013     Sleep apnea      Uncomplicated asthma        Past Surgical History:   Procedure Laterality Date     BACK SURGERY  10/07    lumbar discectomy L5-S1     COLONOSCOPY      Note: colonoscopy scheduled with [facility] on Friday, 9/4/15     COSMETIC SURGERY  2012    Nose Exterior - functional     GI SURGERY  August 2013    Sigmoidectomy     HERNIA REPAIR, UMBILICAL  8/23/11    Dr. Evan whiting     INCISION AND DRAINAGE, ABSCESS, COMPLEX  8/23/11    umbilical, Dr. Evan Beavers     LAPAROSCOPIC ASSISTED COLECTOMY LEFT (DESCENDING)  8/15/2013    Procedure: LAPAROSCOPIC ASSISTED COLECTOMY LEFT (DESCENDING);  Laparoscopic Hand Assisted Sigmoid Resection, Mobilization of Splenic Fissure, coloproctoscopy, *Latex Free Room* Anesthesia General with Pain block  ;  Surgeon: Aurora Justice MD;  Location: [facility] OR     NERVE SURGERY  8/18/11    RF ablation @ L3-S1 @ [facility]     RECONSTRUCT NOSE AND SEPTUM (FUNCTIONAL)  10/14/2011    Procedure:RECONSTRUCT NOSE AND SEPTUM (FUNCTIONAL); Functional Septorhinoplasty, Turbinate Reduction, ; Surgeon:CEDRIC CUEVAS; Location:[facility] OR     SINUS SURGERY  10/1/01    ethmoidectomy chronic sinusitis       Family History   Problem Relation Age of Onset     Musculoskeletal Disorder Mother         back     Anxiety Disorder Mother      Colon Polyps Mother       "Ulcerative Colitis Mother         and ischemic small intestine, surgery     Hypertension Mother      Breast Cancer Mother      Osteoporosis Mother      Diabetes Mother         Type 2, Diagnosed in 2014     Depression Mother         Takes Cymbalta to help with chronic pain + depx     Thyroid Disease Mother         Hypothyroidism     Obesity Mother         Under much better control latter half of 2015     Musculoskeletal Disorder Father         back     Substance Abuse Father      Hypertension Father      Hyperlipidemia Father      Depression Father         Off meds for many years. Seems \"ok\"     Heart Disease Maternal Grandmother      Heart Disease Maternal Grandfather      Psychotic Disorder Paternal Grandfather      Suicide Paternal Grandfather      Depression Paternal Grandfather         Pediatrician. Committed suicide by pistol in 1990.     Musculoskeletal Disorder Brother         back     Depression Brother         Expressed as anger and moodiness     Substance Abuse Brother      Substance Abuse Sister      Depression Sister         Mental Health Therapist, yet no anti-depressants?     Anxiety Disorder Sister         Mental Health Therapist, yet no anti-anxiety meds?     Other Cancer Other         Bladder Cancer - Fatal     Substance Abuse Brother      Colon Cancer No family hx of      Crohn's Disease No family hx of      Anesthesia Reaction No family hx of      Cancer No family hx of         No family history of skin cancer       Social History     Tobacco Use     Smoking status: Never Smoker     Smokeless tobacco: Never Used   Substance Use Topics     Alcohol use: Not Currently     Alcohol/week: 0.0 standard drinks     Drinks per session: 1 or 2     Binge frequency: Weekly     Comment: occ 1/week         Objective:    /80   Pulse 90   Wt 146.1 kg (322 lb)   BMI 37.21 kg/m    Pulses are palpable +2/4 DP & PT bilateral.  Sensation to light touch is intact.  No fore foot deformities are noted.  Normal " ROM all forefoot joints.  Today very minimal pain under the right second and third metatarsal head.  Negative Lockman's test.  Negative Amando's click.  No pain with range of motion.  No erythema edema or ecchymosis noted.  No evidence of deep abscess or infection noted.  Pain on palpation of right and left first toe medial borders.  Erythema, edema noted.  Nail appears to be incurvated on the affected border.     Assessment:  Onychocryptosis with paronychia right and left first toe medial border(s)                          Right foot subsecond and third capsulitis    Plan:  Discussed etiology and treatment options with the pt.  The potential causes and nature of an ingrown toenail were discussed with the patient.  We reviewed the natural history/prognosis of the condition and potential risks if no treatment is provided.      Treatment options discussed included conservative management (oral antibiotics, soaking of foot, adequate width shoes)  as well as surgical management (partial or total nail removal).  The pros and cons of both forms of treatment were reviewed.  Handout dispensed.       After thorough discussion and answering all questions, the patient elected to have permanent removal of right and left first toe medial borders.  Risk complications and efficacy discussed with patient.  Obtained consent, used 3cc of 1/4% marcaine plain to block aformentioned toe(s).  Sterile prep, then avulsed right and left first toe medial border(s).  No evidence of deep abscess noted.  Phenol was applied times 3 at 30 second intervals with curettage in between and then alcohol rinse.  Pt tolerated procedure well.  Sterile bandage placed, gave wound care instruction.      Discussed with patient right subsecond and third capsulitis significantly better.  Continue good stiff shoes and metatarsal pads.  He will not wear malleable shoes.  Return to clinic prn.    Peng Perez, LIAN DPM, FACFAS           Again, thank you for  allowing me to participate in the care of your patient.        Sincerely,        Peng Perez DPM

## 2020-09-11 NOTE — TELEPHONE ENCOUNTER
Lalo messaged patient letting him know his RX is ready for  at the .    Farida James    
Message from [employer]:  Original authorizing provider: Ksenia Lyles MD    Tito Pineda would like a refill of the following medications:  traMADol (ULTRAM) 50 MG tablet [Ksenia Lyles MD]    Preferred pharmacy: [facility] -     Comment:  I'm concerned that I will run out if medication while away on vacation. If possible, please prepare a new paper-Rx (which I might fill at [facility] in [facility]). Thanks, and Happy Thanksgiving!  
Refilled to    
Requested Prescriptions   Pending Prescriptions Disp Refills     traMADol (ULTRAM) 50 MG tablet 30 tablet 0     Sig: Take 1 tablet (50 mg) by mouth every 6 hours as needed for moderate to severe pain    There is no refill protocol information for this order          traMADol (ULTRAM) 50 MG tablet      Last Written Prescription Date:  11/13/18  Last Fill Quantity: 30,   # refills: 0  Last Office Visit: 11/13/18  Future Office visit:    Next 5 appointments (look out 90 days)     Jan 30, 2019  1:20 PM CST   Return Visit with Oneil Baxter MD   [facility] ([facility])    [address]  [place] [address]   [phone]                   Routing refill request to provider for review/approval because:  Drug not on the [facility], [facility] or [facility] refill protocol or controlled substance    
Requested Prescriptions   Pending Prescriptions Disp Refills     traMADol (ULTRAM) 50 MG tablet 30 tablet 0     Sig: Take 1 tablet (50 mg) by mouth every 6 hours as needed for moderate to severe pain    There is no refill protocol information for this order        Routing refill request to provider for review/approval because:  Drug not on the [facility] [place] refill protocol     Patient sent [employer] message as follows:  Comment:  I'm concerned that I will run out if medication while away on vacation. If possible, please prepare a new paper-Rx (which I might fill at [facility] in [facility]). Thanks, and Happy Thanksgiving!            Martell Chaves RN, BSN         
Hide Additional Notes?: No
Detail Level: Simple
Additional Notes (Optional): Related to use of acrylic nails.

## 2020-09-14 ENCOUNTER — MYC REFILL (OUTPATIENT)
Dept: FAMILY MEDICINE | Facility: CLINIC | Age: 45
End: 2020-09-14

## 2020-09-14 DIAGNOSIS — E11.8 TYPE 2 DIABETES MELLITUS WITH COMPLICATION, WITHOUT LONG-TERM CURRENT USE OF INSULIN (H): ICD-10-CM

## 2020-09-15 ENCOUNTER — TRANSFERRED RECORDS (OUTPATIENT)
Dept: HEALTH INFORMATION MANAGEMENT | Facility: CLINIC | Age: 45
End: 2020-09-15

## 2020-09-16 RX ORDER — METFORMIN HCL 500 MG
1000 TABLET, EXTENDED RELEASE 24 HR ORAL
Qty: 180 TABLET | Refills: 0 | Status: SHIPPED | OUTPATIENT
Start: 2020-09-16 | End: 2022-04-22

## 2020-09-16 NOTE — TELEPHONE ENCOUNTER
Prescription approved per [facility] Refill Protocol.    Ashely Ramirez RN

## 2020-09-17 ENCOUNTER — MYC REFILL (OUTPATIENT)
Dept: FAMILY MEDICINE | Facility: CLINIC | Age: 45
End: 2020-09-17

## 2020-09-17 DIAGNOSIS — M45.8 ANKYLOSING SPONDYLITIS OF SACRAL REGION (H): ICD-10-CM

## 2020-09-17 DIAGNOSIS — M51.369 DDD (DEGENERATIVE DISC DISEASE), LUMBAR: ICD-10-CM

## 2020-09-18 RX ORDER — OXYCODONE HYDROCHLORIDE 5 MG/1
5-10 TABLET ORAL EVERY 6 HOURS PRN
Qty: 35 TABLET | Refills: 0 | Status: SHIPPED | OUTPATIENT
Start: 2020-09-18 | End: 2020-10-12

## 2020-09-18 NOTE — TELEPHONE ENCOUNTER
Controlled Substance Refill Request for Oxycodone  Problem List Complete:  Yes  Chronic pain syndrome   Problem Detail     Noted:  4/4/2017    Priority:  Medium    Overview Addendum 8/28/2020  9:30 AM by Martell Chaves RN    Patient is followed by Ksenia Lyles MD for ongoing prescription of pain medication.  All refills should only be approved by this provider, or covering partner.     Medication(s): oxy 5.   Maximum quantity per month: 40  Clinic visit frequency required: Q3  months      Controlled substance agreement: 6/23/2020  UDS: Aug 2019     Encounter-Level CSA - 04/04/2017:            Controlled Substance Agreement - Scan on 4/11/2017  1:30 PM : CONTROLLED SUBSTANCE AGREEMENT (below)                   Pain Clinic evaluation in the past: Yes     DIRE Total Score(s):  No flowsheet data found.     Last [place] website verification:  08/28/20    [email or web address]     checked in past 3 months?  Yes 8/28/20   RX monitoring program ([place]) reviewed:  not reviewed/not due - last done on 8/28/20  [place] profile:  [email or web address]  Last Written Prescription Date:  8/28/20  Last Fill Quantity: 35 tablets  # refills: 0   Last office visit: 8/18/2020 with prescribing provider:  8/18/20   Future Office Visit:   Next 5 appointments (look out 90 days)    Nov 20, 2020  9:00 AM CST  Return Visit with Oneil Baxter MD  [facility] ([facility] [address] [address]  [phone]               Martell Chaves RN, BSN, PHN

## 2020-09-21 ENCOUNTER — TELEPHONE (OUTPATIENT)
Dept: SURGERY | Facility: CLINIC | Age: 45
End: 2020-09-21

## 2020-09-21 NOTE — TELEPHONE ENCOUNTER
[facility] Call Center    Phone Message    May a detailed message be left on voicemail: yes     Reason for Call: Other: Pt is wanting to schedule for painful hemorrhoids. Per call center GL's must send high priority encounter. Please advise. Thank you!     Action Taken: Message routed to:  [facility] ([facility] Rectal     Travel Screening: Not Applicable

## 2020-09-25 ENCOUNTER — NURSE TRIAGE (OUTPATIENT)
Dept: FAMILY MEDICINE | Facility: CLINIC | Age: 45
End: 2020-09-25

## 2020-09-25 NOTE — TELEPHONE ENCOUNTER
This writer attempted to contact Joel on 09/25/20      Reason for call return patient's call and left message.      If patient calls back:   Registered Nurse called. Follow Triage Call workflow        Heather White RN

## 2020-09-25 NOTE — TELEPHONE ENCOUNTER
Heather White: Please notify Joel Pineda that his symptoms are unlikely related to enbrel.  I recommend that he be evaluated with his primary care provider, sooner in the ED if symptoms worsen.  If no etiology identified and symptoms persist then we can trial holding Enbrel, but still a low suspicion that Enbrel is related.     (I do work on Fridays!)    Oneil Baxter MD  9/25/2020 4:50 PM

## 2020-09-25 NOTE — TELEPHONE ENCOUNTER
He gives himself weekly injections of Enbrel. Yesterday afternoon he gave himself Enbrel injection and since the injection he has had the ongoing nausea and stomach pain 4/10. Upper midline abdominal pain sometimes pain in the upper left quadrant. He has a history of GI problems. He has never had nausea that persists this long. The Reglan is not helping relieve his symptoms. He is eating some and drinking Gatorade. He is urinating, perhaps a little hesitancy. He had a few soft bowel movements this am. He also increased his Seroquel 3 days ago to 100 mg to help aide in sleep. He is not sure what is causing his persistent nausea and abdominal discomfort. He said [facility] doesn't work on Fridays so he did not call rheumatology today regarding his Enbrel injection which he thinks is causing his nausea and abdominal discomfort.  My nurse protocol somehow keeps flipping to pediatric assessment of nausea. Nothing seems to be flagging or directing him to be seen. I notified patient I would send a message to his provider and [facility]'s team to see what he should do. He is to call back if he'd like to see if anyone responded in his chart. Otherwise we discussed watching for symptoms of fever, increased abdominal pain, dehydration, or vomiting. He will call back with any new or worsening symptoms [facility]. For now he will rest and stay hydrated.     Routing to provider to review and advise.     Heather White RN  [facility]        Additional Information    Negative: Shock suspected (e.g., cold/pale/clammy skin, too weak to stand, low BP, rapid pulse)    Negative: Sounds like a life-threatening emergency to the triager    Negative: Nausea or vomiting and pregnancy < 20 weeks    Negative: Menstrual Period - Missed or Late (i.e., pregnancy suspected)    Negative: Heat exhaustion suspected (i.e., dehydration from heat exposure)    Negative: Anxiety or  stress suspected (i.e., nausea with anxiety attacks or stressful situations)    Negative: Traumatic Brain Injury (TBI) suspected    Negative: Vomiting occurs    Negative: Vomiting occurs    Negative: Abdominal pain occurs and female    Negative: Signs of shock (very weak, limp, not moving, gray skin, etc.)    Negative: Sounds like a life-threatening emergency to the triager    Negative: Age < 3 months    Negative: Age 3 - 12 months    Negative: Vomiting (or child feels like needs to vomit) is the main symptom    Negative: Constipation also present or being treated for constipation (Exception: SEVERE pain)    Negative: Diarrhea is the main symptom and abdominal pain is mild and intermittent    Negative: Pain on urination and abdominal pain is mild    Negative: Follows abdominal injury    Negative: Vomiting blood    Negative: Could be poisoning with a plant, medicine, or chemical    Negative: Severe (excruciating) pain    Negative: Lying down and unable to walk    Negative: Walks bent over or holding the abdomen    Negative: Pain in the scrotum or testicle    Negative: Blood in the stool    Negative: Appendicitis suspected (e.g., constant pain > 2 hours, RLQ location, walks bent over holding abdomen, jumping makes pain worse, etc.)    Negative: Intussusception suspected (brief attacks of severe abdominal pain/crying suddenly switching to 2 to 10 minute periods of quiet) (age usually < 3 years)    Negative: High-risk child (e.g., diabetes, SCD, hernia, recent abdominal surgery)    Negative: Vomiting bile (green color)    Negative: Child sounds very sick or weak to the triager    Negative: Pain low on the right side    Negative: Pain (or crying) that is constant for > 2 hours    Negative: Tenderness mainly present low on right side when caller presses on the abdomen    Negative: Age < 2 years    Negative: Diabetes suspected (excessive drinking, frequent urination, weight loss, rapid breathing, etc.)    Negative: Fever >  "105 F (40.6 C)    Negative: Fever (Exception: suspected gastroenteritis)    Negative: Strep throat suspected (sore throat with mild abdominal pain)    Negative: Mild pain that comes and goes (cramps) lasts > 24 hours    Negative: Triager thinks child needs to be seen for non-urgent acute problem    Negative: Caller wants child seen for non-urgent problem    Negative: Abdominal pain occurs and male    Negative: Motion sickness suspected    Negative: Fever > 105 F (40.6 C)    Negative: Fever and weak immune system (sickle cell disease, HIV, splenectomy, chemotherapy, organ transplant, chronic oral steroids, etc)    Negative: Child sounds very sick or weak to triager    Negative: Fever present > 3 days (72 hours)    Negative: Nausea started after taking fever medicine for 3 or more days    Negative: Alcohol or drug abuse suspected (usually a teen)    Negative: Pregnancy suspected    Negative: Nausea lasts > 1 week    Negative: Triager thinks child needs to be seen for non-urgent problem    Negative: Caller wants child seen for non-urgent problem    Negative: Unexplained nausea    Other symptom is present, see that guideline.  (e.g., chest pain, headache, dizziness, abdominal pain, colds, sore throat, etc.).    Answer Assessment - Initial Assessment Questions  1. NAUSEA SEVERITY: \"How bad is the nausea?\" (e.g., mild, moderate, severe; dehydration, weight loss)    - MILD: loss of appetite without change in eating habits    - MODERATE: decreased oral intake without significant weight loss, dehydration, or malnutrition    - SEVERE: inadequate caloric or fluid intake, significant weight loss, symptoms of dehydration      Moderate to Severe   2. ONSET: \"When did the nausea begin?\"      Yesterday   3. VOMITING: \"Any vomiting?\" If so, ask: \"How many times today?\"      No vomiting   4. RECURRENT SYMPTOM: \"Have you had nausea before?\" If so, ask: \"When was the last time?\" \"What happened that time?\"      This has not happened " "before.   5. CAUSE: \"What do you think is causing the nausea?\"      \"My Enbrel\"  6. PREGNANCY: \"Is there any chance you are pregnant?\" (e.g., unprotected intercourse, missed birth control pill, broken condom)      N/A    Answer Assessment - Initial Assessment Questions  1. DESCRIPTION: \"What is the nausea like?\"      Constant   2. ONSET: \"When did the nausea begin?\"      Yesterday afternoon   3. VOMITING: \"Any vomiting?\" If so, ask: \"How many times today?\"      No   4. RECURRENT SYMPTOM: \"Has your child had nausea before?\" If so, ask: \"When was the last time?\" \"What happened that time?\"      Not due to the Enbrel   5. CAUSE: \"What do you think is causing the nausea?\"      The Enbrel - surprised nausea is lingering    Answer Assessment - Initial Assessment Questions  1. LOCATION: \"Where does it hurt?\"       Midline upper to left upper quadrant   2. ONSET: \"When did the pain start?\" (Minutes, hours or days ago)       Yesterday afternoon   3. PATTERN: \"Does the pain come and go, or is it constant?\"       If constant: \"Is it getting better, staying the same, or worsening?\"       (NOTE: most serious pain is constant and it progresses)      If intermittent: \"How long does it last?\"  \"Does your child have the pain now?\"      (NOTE: Intermittent means the pain becomes MILD pain or goes away completely between bouts.       Children rarely tell us that pain goes away completely, just that it's a lot better.)      Intermittent, sometimes up to several hours   4. WALKING: \"Is your child walking normally?\" If not, ask, \"What's different?\"       (NOTE: children with appendicitis may walk slowly and bent over or holding their abdomen)      [place] fine   5. SEVERITY: \"How bad is the pain?\" \"What does it keep your child from doing?\"       - MILD:  doesn't interfere with normal activities       - MODERATE: interferes with normal activities or awakens from sleep       - SEVERE: excruciating pain, unable to do any normal activities, " "doesn't want to move, incapacitated      5/10 if stinging pain, otherwise it is generalized abdominal pain 5/10   6. CHILD'S APPEARANCE: \"How sick is your child acting?\" \" What is he doing right now?\" If asleep, ask: \"How was he acting before he went to sleep?\"      N/A   7. RECURRENT SYMPTOM: \"Has your child ever had this type of abdominal pain before?\" If so, ask: \"When was the last time?\" and \"What happened that time?\"       Yes, just with infusions a couple years ago   8. CAUSE: \"What do you think is causing the abdominal pain?\" Since constipation is a common cause, ask \"When was the last stool?\" (Positive answer: 3 or more days ago)      Enbrel medication in which he administered to himself yesterday.    Protocols used: NAUSEA-A-OH, NAUSEA-P-OH, ABDOMINAL PAIN - MALE-P-OH      "

## 2020-09-25 NOTE — TELEPHONE ENCOUNTER
Reason for Call:  Medication question    Detailed comments: patient is having some nausea and vomiting thinking from the injection     Please call and advise    Phone Number Patient can be reached at: Home number on file [phone] (home)    Best Time: any    Can we leave a detailed message on this number? YES    Call taken on 9/25/2020 at 3:36 PM by Rogerio Seals

## 2020-09-28 NOTE — TELEPHONE ENCOUNTER
Tito contacted and informed of the below. He understands the recommendation to be evaluated by PCP. I offered him a face to face appointment (last available) with Dr. Lyles today, but patient declines. He would like to see how today goes since he is starting to feel better.     He slept lots yesterday, he feels fatigued. He is able to eat and drink. His anti-emetic medications are helping some. He is urinating and keeping hydrated. His bowel movements are soft when he uses imodium. He denies fever, chills, weakness, congestion, cough/cold symptoms. He continues to have generalized abdominal pain, epigastric discomfort and LUQ pain, which he says are slowly improving. He still declines face to face and virtal visit at this time. He will continue to monitor. He reports if things don't improve he will call back to schedule.     FYI to provider.   Heather White RN  [facility]

## 2020-09-29 NOTE — TELEPHONE ENCOUNTER
[facility] Call Center    Phone Message    May a detailed message be left on voicemail: yes     Reason for Call: Other: Patient was calling back about scheduling sooner than November, for hemorrhoid banding of painful hemorrhoid.  Message was sent over on 9/21/20 but, patient never received a call back.  Please follow up with patient.  Thank you!     Action Taken: Message routed to:  [facility] ([facility] Clinic Coord S[facility]    Travel Screening: Not Applicable

## 2020-10-04 ENCOUNTER — MYC REFILL (OUTPATIENT)
Dept: FAMILY MEDICINE | Facility: CLINIC | Age: 45
End: 2020-10-04

## 2020-10-04 DIAGNOSIS — R11.0 NAUSEA: ICD-10-CM

## 2020-10-04 DIAGNOSIS — I10 ESSENTIAL HYPERTENSION WITH GOAL BLOOD PRESSURE LESS THAN 140/90: Chronic | ICD-10-CM

## 2020-10-04 DIAGNOSIS — E78.1 HYPERTRIGLYCERIDEMIA: ICD-10-CM

## 2020-10-04 DIAGNOSIS — E78.5 HYPERLIPIDEMIA LDL GOAL <70: ICD-10-CM

## 2020-10-04 RX ORDER — OMEGA-3-ACID ETHYL ESTERS 1 G/1
CAPSULE, LIQUID FILLED ORAL
Qty: 360 CAPSULE | Refills: 2 | Status: CANCELLED | OUTPATIENT
Start: 2020-10-04

## 2020-10-04 RX ORDER — METOCLOPRAMIDE 5 MG/1
5 TABLET ORAL 3 TIMES DAILY PRN
Qty: 90 TABLET | Refills: 1 | Status: CANCELLED | OUTPATIENT
Start: 2020-10-04

## 2020-10-06 ENCOUNTER — VIRTUAL VISIT (OUTPATIENT)
Dept: FAMILY MEDICINE | Facility: CLINIC | Age: 45
End: 2020-10-06
Payer: MEDICARE

## 2020-10-06 DIAGNOSIS — E11.8 TYPE 2 DIABETES MELLITUS WITH COMPLICATION, WITHOUT LONG-TERM CURRENT USE OF INSULIN (H): Primary | ICD-10-CM

## 2020-10-06 DIAGNOSIS — E78.5 HYPERLIPIDEMIA LDL GOAL <70: ICD-10-CM

## 2020-10-06 DIAGNOSIS — R11.0 NAUSEA: ICD-10-CM

## 2020-10-06 DIAGNOSIS — E78.1 HYPERTRIGLYCERIDEMIA: ICD-10-CM

## 2020-10-06 DIAGNOSIS — G89.4 CHRONIC PAIN SYNDROME: ICD-10-CM

## 2020-10-06 PROCEDURE — 99442 PR PHYSICIAN TELEPHONE EVALUATION 11-20 MIN: CPT | Mod: 95 | Performed by: FAMILY MEDICINE

## 2020-10-06 RX ORDER — GLIPIZIDE 5 MG/1
5 TABLET, FILM COATED, EXTENDED RELEASE ORAL DAILY
Qty: 30 TABLET | Refills: 0 | Status: SHIPPED | OUTPATIENT
Start: 2020-10-06 | End: 2020-10-27

## 2020-10-06 RX ORDER — OMEGA-3-ACID ETHYL ESTERS 1 G/1
CAPSULE, LIQUID FILLED ORAL
Qty: 360 CAPSULE | Refills: 1 | Status: SHIPPED | OUTPATIENT
Start: 2020-10-06 | End: 2021-04-07

## 2020-10-06 RX ORDER — METOCLOPRAMIDE 5 MG/1
5 TABLET ORAL 3 TIMES DAILY PRN
Qty: 90 TABLET | Refills: 0 | Status: SHIPPED | OUTPATIENT
Start: 2020-10-06 | End: 2020-11-22

## 2020-10-06 NOTE — PROGRESS NOTES
"Joel Pineda is a 45 year old male who is being evaluated via a billable telephone visit.      The patient has been notified of following:     \"This telephone visit will be conducted via a call between you and your physician/provider. We have found that certain health care needs can be provided without the need for a physical exam.  This service lets us provide the care you need with a short phone conversation.  If a prescription is necessary we can send it directly to your pharmacy.  If lab work is needed we can place an order for that and you can then stop by our lab to have the test done at a later time.    Telephone visits are billed at different rates depending on your insurance coverage. During this emergency period, for some insurers they may be billed the same as an in-person visit.  Please reach out to your insurance provider with any questions.    If during the course of the call the physician/provider feels a telephone visit is not appropriate, you will not be charged for this service.\"    Patient has given verbal consent for Telephone visit?  Yes    What phone number would you like to be contacted at? cell    How would you like to obtain your AVS? Kal Garcia     Joel Pineda is a 45 year old male who presents via phone visit today for the following health issues:    HPI     Phone visit with patient today in follow-up of diabetes.  Patient has been having some ongoing diarrhea and hemorrhoid issues.  Met with [facility] and they suggested this could possibly be related to metformin.  Other studies are being considered as well.  Had colonoscopy and endoscopy done.  Is also met with colorectal surgery and will be having a banding procedure next week.  But we did discuss that metformin can cause loose stools and diarrhea in some individuals.  His diabetes is been very well controlled with a recent A1c of 6.1.  So he is unlikely to need much therapy overall and we did discuss changing to " another agent.  Also working with specialists on his ongoing back pain and postherpetic neuralgia.  Will be due for urine drug screen in the coming months.    Review of Systems   Constitutional, HEENT, cardiovascular, pulmonary, gi and gu systems are negative, except as otherwise noted.       Objective          Vitals:  No vitals were obtained today due to virtual visit.    healthy, alert and no distress  PSYCH: Alert and oriented times 3; coherent speech, normal   rate and volume, able to articulate logical thoughts, able   to abstract reason, no tangential thoughts, no hallucinations   or delusions  His affect is normal  RESP: No cough, no audible wheezing, able to talk in full sentences  Remainder of exam unable to be completed due to telephone visits    Past labs reviewed with the patient.         Assessment/Plan:    Assessment & Plan     Type 2 diabetes mellitus with complication, without long-term current use of insulin (H)  We will go ahead and stop his metformin and start him on glipizide 5 mg daily.  We will be in contact over the next 2 to 3 weeks on his loose stools and whether things have improved or not.  If no change then it is unlikely to be the metformin.  But if things do improve then we can assess whether glipizide is the correct medication and/or dosage over the course of the next couple of months.  - glipiZIDE (GLUCOTROL XL) 5 MG 24 hr tablet; Take 1 tablet (5 mg) by mouth daily  - **A1C FUTURE anytime; Future  - Albumin Random Urine Quantitative with Creat Ratio; Future  - Lipid panel reflex to direct LDL Fasting; Future    Chronic pain syndrome  Future lab order  - Drug Abuse Screen Panel 13, Urine (Pain Care Package); Future        See Patient Instructions    Return in about 6 weeks (around 11/17/2020) for Diabetes Recheck with Previsit Labs.    Ksenia Lyles MD  [facility]    Phone call duration:  12 minutes

## 2020-10-07 ENCOUNTER — VIRTUAL VISIT (OUTPATIENT)
Dept: PHARMACY | Facility: CLINIC | Age: 45
End: 2020-10-07
Payer: COMMERCIAL

## 2020-10-07 DIAGNOSIS — G90.A POTS (POSTURAL ORTHOSTATIC TACHYCARDIA SYNDROME): ICD-10-CM

## 2020-10-07 DIAGNOSIS — K64.9 HEMORRHOIDS, UNSPECIFIED HEMORRHOID TYPE: ICD-10-CM

## 2020-10-07 DIAGNOSIS — J30.1 ALLERGIC RHINITIS DUE TO POLLEN, UNSPECIFIED SEASONALITY: ICD-10-CM

## 2020-10-07 DIAGNOSIS — B02.29 POST HERPETIC NEURALGIA: ICD-10-CM

## 2020-10-07 DIAGNOSIS — E11.9 TYPE 2 DIABETES MELLITUS WITHOUT COMPLICATION, WITHOUT LONG-TERM CURRENT USE OF INSULIN (H): Primary | ICD-10-CM

## 2020-10-07 PROCEDURE — 99607 MTMS BY PHARM ADDL 15 MIN: CPT | Mod: TEL | Performed by: PHARMACIST

## 2020-10-07 PROCEDURE — 99606 MTMS BY PHARM EST 15 MIN: CPT | Mod: TEL | Performed by: PHARMACIST

## 2020-10-07 NOTE — PATIENT INSTRUCTIONS
Recommendations from today's MTM visit:                                                      Recommend checking fasting blood sugars and occasionally 2 hours after you eat. RX sent for test strips.     It was great to speak with you today.  I value your experience and would be very thankful for your time with providing feedback on our clinic survey. You may receive a survey via email or text message in the next few days.     Next MTM visit: 8 weeks    To schedule another MTM appointment, please call the clinic directly or you may call the MTM scheduling line at [phone] or toll-free at [phone].     My Clinical Pharmacist's contact information:                                                      It was a pleasure talking with you today!  Please feel free to contact me with any questions or concerns you have.      Radha Mcdonald, Pharm.D, BCACP  Medication Therapy Management Pharmacist

## 2020-10-07 NOTE — TELEPHONE ENCOUNTER
Routing to provider to advise as patient just had visit yesterday and refill did not get addressed.    Martell Chaves RN, BSN, PHN

## 2020-10-07 NOTE — PROGRESS NOTES
"MTM ENCOUNTER  SUBJECTIVE/OBJECTIVE:                           Joel Pineda is a 45 year old male called for a follow-up visit. He was referred to me from Ksenia Lyles.  Today's visit is a follow-up MTM visit from 8/5/2020     Patient consented to a telehealth visit: yes  Telemedicine Visit Details  Type of service:  Telephone visit  Start Time:11:33AM  End Time: 12:03PM  Originating Location (pt. Location): Home  Distant Location (provider location):  [facility] MTM  Mode of Communication:  Telephone    Chief Complaint: Medication Review.    Allergies/ADRs: Reviewed in Epic  Tobacco: No tobacco use  Alcohol: none  Caffeine: not assessed today  Activity: patient will start exercising, pool and walking track  PMH: Reviewed in Epic    Post Herpetic Neuralgia: Patient was feeling \"zaps\" throughout his body. Patient is already taking Lyrica 150mg twice times daily, lidocaine ointment, oxycodone (patient has mainly been using after physical therapy). Pain has been better. Patient does have topical medical marijuana but hasn't used because of the scent.    Type 2 Diabetes:  Pt currently taking glipizide XL 5mg daily. (metformin discontinued-[facility] thought may be causing diarrhea). No side effects.  SMBG: never.   Symptoms of low blood sugar? none  Symptoms of high blood sugar? none  Eye exam: up to date  Foot exam: up to date  Aspirin: Taking 81mg daily and denies side effects  Statin: Yes: Rosuvastatin   ACEi/ARB: Yes: Enalapril.   Urine Albumin:   Lab Results   Component Value Date    UMALCR 21.38 (H) 01/24/2020      Lab Results   Component Value Date    A1C 6.0 01/24/2020    A1C 5.9 09/13/2019    A1C 6.1 03/19/2019    A1C 6.7 12/24/2018    A1C 5.8 05/01/2018     Hemorrhoids: Patient has been using Calmol 4 suppositories 2 times a day and these have been helpful. Patient is having hemorrhoid banding next week. R/O microscopic colitis.    POTS: patient feels this has been limiting some of his " activities. Patient has been using an abdominal binder that helps. He feels he has not been staying hydrated and that could be contributing to his dizziness as well.     Allergies: current therapy includes fluticasone nasal spray. Patient has found that this has been effective to decrease the amount of decongestant he has needed to use.     ASSESSMENT:                             Current medications were reviewed today.     Medication Adherence: no issues identified    Post-Herpetic Neuralgia: Stable.    Diabetes: Patient is meeting A1c goal <7%. Patient recently had a med change. Patient would benefit from checking blood sugars.    Hemorrhoids: Stable.    POTS: Stable    Allergies: Stable    PLAN:                          Recommend checking fasting blood sugars and some post-prandial blood sugars. RX sent for test strips.     I spent 30 minutes with this patient today.  All changes were made via collaborative practice agreement with Ksneia Lyles. A copy of the visit note was provided to the patient's primary care provider.    Will follow up in 8 weeks.    The patient was sent via [employer] a summary of these recommendations as an after visit summary.     Radha Mcdonald, Pharm.D, BCACP  Medication Therapy Management Pharmacist

## 2020-10-08 RX ORDER — METOPROLOL SUCCINATE 200 MG/1
200 TABLET, EXTENDED RELEASE ORAL 2 TIMES DAILY
Qty: 180 TABLET | Refills: 0 | Status: SHIPPED | OUTPATIENT
Start: 2020-10-08 | End: 2020-12-18

## 2020-10-09 ENCOUNTER — HOSPITAL ENCOUNTER (EMERGENCY)
Facility: CLINIC | Age: 45
Discharge: HOME | End: 2020-10-09
Attending: EMERGENCY MEDICINE | Admitting: EMERGENCY MEDICINE
Payer: MEDICARE

## 2020-10-09 VITALS
BODY MASS INDEX: 36.45 KG/M2 | OXYGEN SATURATION: 98 % | HEART RATE: 80 BPM | DIASTOLIC BLOOD PRESSURE: 98 MMHG | HEIGHT: 78 IN | WEIGHT: 315 LBS | TEMPERATURE: 97.7 F | RESPIRATION RATE: 16 BRPM | SYSTOLIC BLOOD PRESSURE: 155 MMHG

## 2020-10-09 DIAGNOSIS — L03.032 PARONYCHIA OF TOE, LEFT: ICD-10-CM

## 2020-10-09 LAB
ALBUMIN SERPL-MCNC: 4.3 G/DL (ref 3.4–5)
ALP SERPL-CCNC: 113 U/L (ref 40–150)
ALT SERPL W P-5'-P-CCNC: 53 U/L (ref 0–70)
ANION GAP SERPL CALCULATED.3IONS-SCNC: 8 MMOL/L (ref 3–14)
AST SERPL W P-5'-P-CCNC: 53 U/L (ref 0–45)
BASOPHILS # BLD AUTO: 0.1 10E9/L (ref 0–0.2)
BASOPHILS NFR BLD AUTO: 1.1 %
BILIRUB SERPL-MCNC: 0.4 MG/DL (ref 0.2–1.3)
BUN SERPL-MCNC: 11 MG/DL (ref 7–30)
CALCIUM SERPL-MCNC: 9.3 MG/DL (ref 8.5–10.1)
CHLORIDE SERPL-SCNC: 108 MMOL/L (ref 94–109)
CO2 SERPL-SCNC: 22 MMOL/L (ref 20–32)
CREAT SERPL-MCNC: 0.77 MG/DL (ref 0.66–1.25)
CRP SERPL-MCNC: <2.9 MG/L (ref 0–8)
DIFFERENTIAL METHOD BLD: NORMAL
EOSINOPHIL # BLD AUTO: 0.2 10E9/L (ref 0–0.7)
EOSINOPHIL NFR BLD AUTO: 2.1 %
ERYTHROCYTE [DISTWIDTH] IN BLOOD BY AUTOMATED COUNT: 13.2 % (ref 10–15)
GFR SERPL CREATININE-BSD FRML MDRD: >90 ML/MIN/{1.73_M2}
GLUCOSE SERPL-MCNC: 128 MG/DL (ref 70–99)
HCT VFR BLD AUTO: 45.9 % (ref 40–53)
HGB BLD-MCNC: 15.2 G/DL (ref 13.3–17.7)
IMM GRANULOCYTES # BLD: 0 10E9/L (ref 0–0.4)
IMM GRANULOCYTES NFR BLD: 0.2 %
LYMPHOCYTES # BLD AUTO: 3.4 10E9/L (ref 0.8–5.3)
LYMPHOCYTES NFR BLD AUTO: 35.2 %
MCH RBC QN AUTO: 30.9 PG (ref 26.5–33)
MCHC RBC AUTO-ENTMCNC: 33.1 G/DL (ref 31.5–36.5)
MCV RBC AUTO: 93 FL (ref 78–100)
MONOCYTES # BLD AUTO: 1 10E9/L (ref 0–1.3)
MONOCYTES NFR BLD AUTO: 10.2 %
NEUTROPHILS # BLD AUTO: 4.9 10E9/L (ref 1.6–8.3)
NEUTROPHILS NFR BLD AUTO: 51.2 %
NRBC # BLD AUTO: 0 10*3/UL
NRBC BLD AUTO-RTO: 0 /100
PLATELET # BLD AUTO: 264 10E9/L (ref 150–450)
POTASSIUM SERPL-SCNC: 4.1 MMOL/L (ref 3.4–5.3)
PROT SERPL-MCNC: 7.9 G/DL (ref 6.8–8.8)
RBC # BLD AUTO: 4.92 10E12/L (ref 4.4–5.9)
SODIUM SERPL-SCNC: 139 MMOL/L (ref 133–144)
WBC # BLD AUTO: 9.6 10E9/L (ref 4–11)

## 2020-10-09 PROCEDURE — 96365 THER/PROPH/DIAG IV INF INIT: CPT | Performed by: EMERGENCY MEDICINE

## 2020-10-09 PROCEDURE — 250N000009 HC RX 250: Performed by: EMERGENCY MEDICINE

## 2020-10-09 PROCEDURE — 80053 COMPREHEN METABOLIC PANEL: CPT | Performed by: EMERGENCY MEDICINE

## 2020-10-09 PROCEDURE — 85025 COMPLETE CBC W/AUTO DIFF WBC: CPT | Performed by: EMERGENCY MEDICINE

## 2020-10-09 PROCEDURE — 99284 EMERGENCY DEPT VISIT MOD MDM: CPT | Mod: 25 | Performed by: EMERGENCY MEDICINE

## 2020-10-09 PROCEDURE — 86140 C-REACTIVE PROTEIN: CPT | Performed by: EMERGENCY MEDICINE

## 2020-10-09 PROCEDURE — 99284 EMERGENCY DEPT VISIT MOD MDM: CPT | Mod: Z6 | Performed by: EMERGENCY MEDICINE

## 2020-10-09 RX ORDER — CLINDAMYCIN PHOSPHATE 900 MG/50ML
900 INJECTION, SOLUTION INTRAVENOUS ONCE
Status: COMPLETED | OUTPATIENT
Start: 2020-10-09 | End: 2020-10-09

## 2020-10-09 RX ORDER — CLINDAMYCIN HCL 300 MG
300 CAPSULE ORAL 4 TIMES DAILY
Qty: 40 CAPSULE | Refills: 0 | Status: SHIPPED | OUTPATIENT
Start: 2020-10-09 | End: 2020-10-19

## 2020-10-09 RX ADMIN — CLINDAMYCIN IN 5 PERCENT DEXTROSE 900 MG: 18 INJECTION, SOLUTION INTRAVENOUS at 16:29

## 2020-10-09 ASSESSMENT — ENCOUNTER SYMPTOMS
ABDOMINAL PAIN: 0
FEVER: 0
SHORTNESS OF BREATH: 0

## 2020-10-09 ASSESSMENT — MIFFLIN-ST. JEOR: SCORE: 2437.08

## 2020-10-09 NOTE — ED TRIAGE NOTES
"Pt arrived ambulatory to triage w/ c/o of toe abscess. Pt hx partial toenail removal d/t ingrown toenails on bilateral big toes 9/3. Pt stated to do follow up care/ soaking in wash basin with epsom salt, dreft, and hot water per MD orders. Pt noticed this morning that toes were more painful, swollen, and warm. Pt soaked it again this am, states he felt a \"sensation\" he looked down and blood had filled basin. Pt took feet out of basin, let them drain fully before coming to ED.  "

## 2020-10-09 NOTE — ED AVS SNAPSHOT
[facility] Emergency Department  [address] [address]  Phone: [phone]                                    Joel Pineda   MRN: [id number]    Department: [facility] Emergency Department   Date of Visit: 10/9/2020           After Visit Summary Signature Page    I have received my discharge instructions, and my questions have been answered. I have discussed any challenges I see with this plan with the nurse or doctor.    ..........................................................................................................................................  Patient/Patient Representative Signature      ..........................................................................................................................................  Patient Representative Print Name and Relationship to Patient    ..................................................               ................................................  Date                                   Time    ..........................................................................................................................................  Reviewed by Signature/Title    ...................................................              ..............................................  Date                                               Time          22EPIC Rev 08/18

## 2020-10-09 NOTE — ED PROVIDER NOTES
"ED Provider Note  [facility]      History   No chief complaint on file.    The history is provided by the patient and medical records.     Joel Pineda is a 45 year old male with past medical history significant for asthma, type 2 diabetes mellitus, hyperlipidemia, GERD, anxiety, who presents to the ED for evaluation of an abscess on his toe.  On 9/3/2020, the patient underwent a partial toenail removal due to ingrown toenails on the bilateral big toes. Following the procedure, the patient was told to soak his feet in wash basin with Epsom salt, Dreft and hot water.  He states that he was following instructions with soaking his feet 1-2 times per day for a few weeks post surgery.  This morning, the patient noticed that his left toe is more painful, swollen and warm.  The patient soaked the affected areas again this morning where he felt a \"sensation\" and noticed the base and was filled with blood.  He states that he took his feet out of the basin and let them drain fully before presenting to the ED for evaluation of a toe abscess.        Past Medical History  Past Medical History:   Diagnosis Date     Acne      Acquired hypothyroidism      Allergic state      Ankylosing spondylitis lumbar region (H)      Ankylosing spondylitis of sacral region (H)      Anxiety      Bipolar 2 disorder (H)      Chest pain     Chest pain, regulated w/BP meds. Clear arteries.     Chronic pain      DDD (degenerative disc disease), lumbar      Depressive disorder      Diabetes (H)      Diverticulosis      Facet arthritis of cervical region      Gastroesophageal reflux disease      Hypertension      IBS (irritable bowel syndrome)      Intracranial arachnoid cyst      Polyneuropathy      Pulmonary embolism (H)      Skin exam, screening for cancer 12/3/2013     Sleep apnea      Uncomplicated asthma      Past Surgical History:   Procedure Laterality Date     BACK SURGERY  10/07    lumbar discectomy L5-S1     " COLONOSCOPY      Note: colonoscopy scheduled with [facility] on Friday, 9/4/15     COSMETIC SURGERY  2012    Nose Exterior - functional     GI SURGERY  August 2013    Sigmoidectomy     HERNIA REPAIR, UMBILICAL  8/23/11    small, Dr. Evan Beavers     INCISION AND DRAINAGE, ABSCESS, COMPLEX  8/23/11    umbilical, Dr. Evan Beavers     LAPAROSCOPIC ASSISTED COLECTOMY LEFT (DESCENDING)  8/15/2013    Procedure: LAPAROSCOPIC ASSISTED COLECTOMY LEFT (DESCENDING);  Laparoscopic Hand Assisted Sigmoid Resection, Mobilization of Splenic Fissure, coloproctoscopy, *Latex Free Room* Anesthesia General with Pain block  ;  Surgeon: Aurora Justice MD;  Location: [facility] OR     NERVE SURGERY  8/18/11    RF ablation @ L3-S1 @ [facility]     RECONSTRUCT NOSE AND SEPTUM (FUNCTIONAL)  10/14/2011    Procedure:RECONSTRUCT NOSE AND SEPTUM (FUNCTIONAL); Functional Septorhinoplasty, Turbinate Reduction, ; Surgeon:CEDRIC CUEVAS; Location:[facility] OR     SINUS SURGERY  10/1/01    ethmoidectomy chronic sinusitis          clindamycin (CLEOCIN) 300 MG capsule       acetaminophen 500 MG CAPS       albuterol (PROAIR HFA/PROVENTIL HFA/VENTOLIN HFA) 108 (90 Base) MCG/ACT inhaler       albuterol (PROVENTIL) (2.5 MG/3ML) 0.083% neb solution       ALPRAZolam (XANAX) 0.5 MG tablet       aspirin (ASA) 81 MG tablet       blood glucose (CONTOUR NEXT TEST) test strip       cetirizine (ZYRTEC) 10 MG tablet       cholecalciferol (VITAMIN D3) 20414 units (1250 mcg) capsule       cyanocobalamin (CYANOCOBALAMIN) 1000 MCG/ML injection       DULoxetine (CYMBALTA) 60 MG capsule       EPINEPHrine (EPIPEN/ADRENACLICK/OR ANY BX GENERIC EQUIV) 0.3 MG/0.3ML injection 2-pack       etanercept (ENBREL SURECLICK) 50 MG/ML autoinjector       famotidine (PEPCID) 20 MG tablet       fluticasone (FLONASE) 50 MCG/ACT nasal spray       fluticasone-salmeterol (ADVAIR) 500-50 MCG/DOSE inhaler       glipiZIDE (GLUCOTROL XL) 5 MG 24 hr tablet       lamoTRIgine (LAMICTAL) 100 MG tablet        levothyroxine (SYNTHROID/LEVOTHROID) 75 MCG tablet       lidocaine (XYLOCAINE) 5 % external ointment       Liniments (SALONPAS EX)       melatonin 3 MG tablet       metoclopramide (REGLAN) 5 MG tablet       metoprolol succinate ER (TOPROL-XL) 200 MG 24 hr tablet       montelukast (SINGULAIR) 10 MG tablet       nabumetone (RELAFEN) 500 MG tablet       naloxone (NARCAN) 4 MG/0.1ML nasal spray       omega-3 acid ethyl esters (LOVAZA) 1 g capsule       omeprazole 20 MG tablet       order for DME       order for DME       order for DME       order for DME       oxyCODONE (ROXICODONE) 5 MG tablet       pregabalin (LYRICA) 150 MG capsule       QUEtiapine (SEROQUEL) 25 MG tablet       ramipril (ALTACE) 10 MG capsule       Rectal Protectant-Emollient (CALMOL-4) 76-10 % SUPP       rizatriptan (MAXALT-MLT) 5 MG ODT       rosuvastatin (CRESTOR) 40 MG tablet       syringe, disposable, (BD TUBERCULIN SYRINGE) 1 ML MISC       vitamin B complex with vitamin C (STRESS TAB) tablet       vitamin C (ASCORBIC ACID) 500 MG tablet       ZINC SULFATE-VITAMIN C MT      Allergies   Allergen Reactions     Amoxicillin-Pot Clavulanate Difficulty breathing     Banana Shortness Of Breath     Pt reports organic Banana is okay.      Nitroglycerin Palpitations     Penicillins Anaphylaxis     Provigil [Modafinil] Shortness Of Breath     headache     Gadolinium Hives and Itching     Patient was premedicated for the contrast allergy. He did still have a reaction a few hours after injection. Hives and itching. Dr. Gomez told tech to inform pt he should only have contrast again in the future when premedicated and at a hospital. Not at an outpatient facility.      Ketoconazole      Topical cream caused swelling and itching     Dye [Contrast Dye] Other (See Comments) and Hives     Moderate flushing, CT contrast     Golimumab      Hives, bradycardia, face swelling     Neurontin [Gabapentin] Hives     Moderate hives     Nortriptyline Hives      "Varicella Virus Vaccine Live      Rash     Flagyl [Metronidazole Hcl] Palpitations and Hives     Latex Rash     Metronidazole Palpitations, Other (See Comments) and Rash     dizziness (versus ciprofloxacin taken at same time)     No Clinical Screening - See Comments Rash     Nitrile gloves     Family History  Family History   Problem Relation Age of Onset     Musculoskeletal Disorder Mother         back     Anxiety Disorder Mother      Colon Polyps Mother      Ulcerative Colitis Mother         and ischemic small intestine, surgery     Hypertension Mother      Breast Cancer Mother      Osteoporosis Mother      Diabetes Mother         Type 2, Diagnosed in 2014     Depression Mother         Takes Cymbalta to help with chronic pain + depx     Thyroid Disease Mother         Hypothyroidism     Obesity Mother         Under much better control latter half of 2015     Musculoskeletal Disorder Father         back     Substance Abuse Father      Hypertension Father      Hyperlipidemia Father      Depression Father         Off meds for many years. Seems \"ok\"     Heart Disease Maternal Grandmother      Heart Disease Maternal Grandfather      Psychotic Disorder Paternal Grandfather      Suicide Paternal Grandfather      Depression Paternal Grandfather         Pediatrician. Committed suicide by pistol in 1990.     Musculoskeletal Disorder Brother         back     Depression Brother         Expressed as anger and moodiness     Substance Abuse Brother      Substance Abuse Sister      Depression Sister         Mental Health Therapist, yet no anti-depressants?     Anxiety Disorder Sister         Mental Health Therapist, yet no anti-anxiety meds?     Other Cancer Other         Bladder Cancer - Fatal     Substance Abuse Brother      Colon Cancer No family hx of      Crohn's Disease No family hx of      Anesthesia Reaction No family hx of      Cancer No family hx of         No family history of skin cancer     Social History   Social " "History     Tobacco Use     Smoking status: Never Smoker     Smokeless tobacco: Never Used   Substance Use Topics     Alcohol use: Not Currently     Alcohol/week: 0.0 standard drinks     Drinks per session: 1 or 2     Binge frequency: Weekly     Comment: occ 1/week     Drug use: No      Past medical history, past surgical history, medications, allergies, family history, and social history were reviewed with the patient. No additional pertinent items.       Review of Systems   Constitutional: Negative for fever.   Respiratory: Negative for shortness of breath.    Cardiovascular: Negative for chest pain.   Gastrointestinal: Negative for abdominal pain.   Musculoskeletal:        Positive for toe pain, swelling and warmth.   All other systems reviewed and are negative.      Physical Exam   BP: 131/84  Pulse: 93  Temp: 97.7  F (36.5  C)  Resp: 16  Height: 198.1 cm (6' 6\")  Weight: 142.9 kg (315 lb)  SpO2: 98 %  Physical Exam  Constitutional:       General: He is not in acute distress.     Appearance: He is not diaphoretic.   HENT:      Head: Atraumatic.   Eyes:      General: No scleral icterus.     Pupils: Pupils are equal, round, and reactive to light.   Cardiovascular:      Heart sounds: Normal heart sounds.   Pulmonary:      Effort: No respiratory distress.      Breath sounds: Normal breath sounds.   Abdominal:      General: Bowel sounds are normal.      Palpations: Abdomen is soft.      Tenderness: There is no abdominal tenderness.   Musculoskeletal:         General: No tenderness.   Skin:     General: Skin is warm.      Findings: No rash.             ED Course      Procedures           Results for orders placed or performed during the hospital encounter of 10/09/20   CBC with platelets differential     Status: None   Result Value Ref Range    WBC 9.6 4.0 - 11.0 10e9/L    RBC Count 4.92 4.4 - 5.9 10e12/L    Hemoglobin 15.2 13.3 - 17.7 g/dL    Hematocrit 45.9 40.0 - 53.0 %    MCV 93 78 - 100 fl    MCH 30.9 26.5 - 33.0 " pg    MCHC 33.1 31.5 - 36.5 g/dL    RDW 13.2 10.0 - 15.0 %    Platelet Count 264 150 - 450 10e9/L    Diff Method Automated Method     % Neutrophils 51.2 %    % Lymphocytes 35.2 %    % Monocytes 10.2 %    % Eosinophils 2.1 %    % Basophils 1.1 %    % Immature Granulocytes 0.2 %    Nucleated RBCs 0 0 /100    Absolute Neutrophil 4.9 1.6 - 8.3 10e9/L    Absolute Lymphocytes 3.4 0.8 - 5.3 10e9/L    Absolute Monocytes 1.0 0.0 - 1.3 10e9/L    Absolute Eosinophils 0.2 0.0 - 0.7 10e9/L    Absolute Basophils 0.1 0.0 - 0.2 10e9/L    Abs Immature Granulocytes 0.0 0 - 0.4 10e9/L    Absolute Nucleated RBC 0.0    Comprehensive metabolic panel     Status: Abnormal   Result Value Ref Range    Sodium 139 133 - 144 mmol/L    Potassium 4.1 3.4 - 5.3 mmol/L    Chloride 108 94 - 109 mmol/L    Carbon Dioxide 22 20 - 32 mmol/L    Anion Gap 8 3 - 14 mmol/L    Glucose 128 (H) 70 - 99 mg/dL    Urea Nitrogen 11 7 - 30 mg/dL    Creatinine 0.77 0.66 - 1.25 mg/dL    GFR Estimate >90 >60 mL/min/[1.73_m2]    GFR Estimate If Black >90 >60 mL/min/[1.73_m2]    Calcium 9.3 8.5 - 10.1 mg/dL    Bilirubin Total 0.4 0.2 - 1.3 mg/dL    Albumin 4.3 3.4 - 5.0 g/dL    Protein Total 7.9 6.8 - 8.8 g/dL    Alkaline Phosphatase 113 40 - 150 U/L    ALT 53 0 - 70 U/L    AST 53 (H) 0 - 45 U/L   CRP inflammation     Status: None   Result Value Ref Range    CRP Inflammation <2.9 0.0 - 8.0 mg/L     Medications   clindamycin (CLEOCIN) infusion 900 mg (900 mg Intravenous New Bag 10/9/20 2617)        Assessments & Plan (with Medical Decision Making)   45-year-old male who presents for evaluation of left greater toe and foot pain.  Differential includes occult fracture, infection, paronychia, contusion, ulcer.  Exam reveals erythema and drainage from recent male surgical site of big toe consistent with cellulitis.  Laboratories were obtained including CBC and CRP both of which were normal.  Patient was treated with clindamycin IV and will be discharged on this with  instructions to continue soaking and Dreft 2 times a day.  I have recommended follow-up with primary physician in the next 2 weeks.    I have reviewed the nursing notes. I have reviewed the findings, diagnosis, plan and need for follow up with the patient.    New Prescriptions    CLINDAMYCIN (CLEOCIN) 300 MG CAPSULE    Take 1 capsule (300 mg) by mouth 4 times daily for 10 days       Final diagnoses:   Paronychia of toe, left       --  IVidal, am serving as a trained medical scribe to document services personally performed by Aj Alex MD, based on the provider's statements to me.     IAj MD, was physically present and have reviewed and verified the accuracy of this note documented by Vidal Benavidez.    Aj Alex MD  [facility] EMERGENCY DEPARTMENT  10/9/2020     Aj Alex MD  10/09/20 [id number]

## 2020-10-12 ENCOUNTER — MYC REFILL (OUTPATIENT)
Dept: FAMILY MEDICINE | Facility: CLINIC | Age: 45
End: 2020-10-12

## 2020-10-12 DIAGNOSIS — M51.369 DDD (DEGENERATIVE DISC DISEASE), LUMBAR: ICD-10-CM

## 2020-10-12 DIAGNOSIS — M45.8 ANKYLOSING SPONDYLITIS OF SACRAL REGION (H): ICD-10-CM

## 2020-10-12 NOTE — PROGRESS NOTES
Colon and Rectal Surgery Clinic Note    RE: Joel Pineda.  : 1975.  MARY: 10/14/2020.    Reason for visit: anal pain    HPI: 44M with history of diverticular disease s/p sigmoidectomy with Dr. Justice in . He has most recently been seen for recurrent perineal abscess. He has had abscesses in past, most recently drained in local ED in . He was subsequently seen by Dr. Zhu. This healed and he came in for evaluation of underlying cyst or fistula as a cause for recurrent infection. No lesions were seen on exam. It was recommended he continue treatment for pruritis ani, and follow up as needed. He now presents with complaints of sharp pain, intermittent. Had some blood in stool a few months, nothing recently. Occasionally blood on TP. Currently on antibiotic for toe infection. Was having diarrhea due to metformin, improved with glipizide. Also doing baths every other day, calmoseptine helps as well. Unclear if pain with BM. Pain has been ongoing. Takes benefiber which helps. On Etanercept for ankylosing spondylitis.    Last colonoscopy was 9/15/20. Cecal and ascending colon polyps were found, path consistent with TA. EGD was normal.       Medical history:  Past Medical History:   Diagnosis Date     Acne      Acquired hypothyroidism      Allergic state      Ankylosing spondylitis lumbar region (H)      Ankylosing spondylitis of sacral region (H)      Anxiety      Bipolar 2 disorder (H)      Chest pain     Chest pain, regulated w/BP meds. Clear arteries.     Chronic pain      DDD (degenerative disc disease), lumbar      Depressive disorder      Diabetes (H)      Diverticulosis      Facet arthritis of cervical region      Gastroesophageal reflux disease      Hypertension      IBS (irritable bowel syndrome)      Intracranial arachnoid cyst      Polyneuropathy      Pulmonary embolism (H)      Skin exam, screening for cancer 12/3/2013     Sleep apnea      Uncomplicated asthma        Surgical  "history:  Past Surgical History:   Procedure Laterality Date     BACK SURGERY  10/07    lumbar discectomy L5-S1     COLONOSCOPY      Note: colonoscopy scheduled with [facility] on Friday, 9/4/15     COSMETIC SURGERY  2012    Nose Exterior - functional     GI SURGERY  August 2013    Sigmoidectomy     HERNIA REPAIR, UMBILICAL  8/23/11    [place], Dr. Evan Beavers     INCISION AND DRAINAGE, ABSCESS, COMPLEX  8/23/11    umbilical, Dr. Evan Beavers     LAPAROSCOPIC ASSISTED COLECTOMY LEFT (DESCENDING)  8/15/2013    Procedure: LAPAROSCOPIC ASSISTED COLECTOMY LEFT (DESCENDING);  Laparoscopic Hand Assisted Sigmoid Resection, Mobilization of Splenic Fissure, coloproctoscopy, *Latex Free Room* Anesthesia General with Pain block  ;  Surgeon: Aurora Justice MD;  Location: [facility] OR     NERVE SURGERY  8/18/11    RF ablation @ L3-S1 @ [facility]     RECONSTRUCT NOSE AND SEPTUM (FUNCTIONAL)  10/14/2011    Procedure:RECONSTRUCT NOSE AND SEPTUM (FUNCTIONAL); Functional Septorhinoplasty, Turbinate Reduction, ; Surgeon:CEDRIC CUEVAS; Location:[facility] OR     SINUS SURGERY  10/1/01    ethmoidectomy chronic sinusitis       Family history:  Family History   Problem Relation Age of Onset     Musculoskeletal Disorder Mother         back     Anxiety Disorder Mother      Colon Polyps Mother      Ulcerative Colitis Mother         and ischemic small intestine, surgery     Hypertension Mother      Breast Cancer Mother      Osteoporosis Mother      Diabetes Mother         Type 2, Diagnosed in 2014     Depression Mother         Takes Cymbalta to help with chronic pain + depx     Thyroid Disease Mother         Hypothyroidism     Obesity Mother         Under much better control latter half of 2015     Musculoskeletal Disorder Father         back     Substance Abuse Father      Hypertension Father      Hyperlipidemia Father      Depression Father         Off meds for many years. Seems \"ok\"     Heart Disease Maternal Grandmother      Heart Disease Maternal " Grandfather      Psychotic Disorder Paternal Grandfather      Suicide Paternal Grandfather      Depression Paternal Grandfather         Pediatrician. Committed suicide by pistol in 1990.     Musculoskeletal Disorder Brother         back     Depression Brother         Expressed as anger and moodiness     Substance Abuse Brother      Substance Abuse Sister      Depression Sister         Mental Health Therapist, yet no anti-depressants?     Anxiety Disorder Sister         Mental Health Therapist, yet no anti-anxiety meds?     Other Cancer Other         Bladder Cancer - Fatal     Substance Abuse Brother      Colon Cancer No family hx of      Crohn's Disease No family hx of      Anesthesia Reaction No family hx of      Cancer No family hx of         No family history of skin cancer       Medications:  Current Outpatient Medications   Medication Sig Dispense Refill     acetaminophen 500 MG CAPS Take 500 mg by mouth every 4 hours as needed 60 capsule      albuterol (PROAIR HFA/PROVENTIL HFA/VENTOLIN HFA) 108 (90 Base) MCG/ACT inhaler Inhale 2 puffs into the lungs every 4 hours as needed for shortness of breath / dyspnea or wheezing 8.5 g 11     albuterol (PROVENTIL) (2.5 MG/3ML) 0.083% neb solution Take 1 vial (2.5 mg) by nebulization every 6 hours as needed for shortness of breath / dyspnea or wheezing (Patient not taking: Reported on 10/7/2020) 200 mL 3     ALPRAZolam (XANAX) 0.5 MG tablet Take 1 tablet up to three times daily.       aspirin (ASA) 81 MG tablet Take 81 mg by mouth daily        blood glucose (CONTOUR NEXT TEST) test strip Use to test blood sugar 1-2 times daily or as directed. 100 strip 11     cetirizine (ZYRTEC) 10 MG tablet Take 1 tablet (10 mg) by mouth At Bedtime 30 tablet 11     cholecalciferol (VITAMIN D3) 72207 units (1250 mcg) capsule Take one capsule every two weeks. 24 capsule 1     clindamycin (CLEOCIN) 300 MG capsule Take 1 capsule (300 mg) by mouth 4 times daily for 10 days 40 capsule 0      cyanocobalamin (CYANOCOBALAMIN) 1000 MCG/ML injection Inject 1 mL (1,000 mcg) into the muscle every 30 days 10 mL 0     DULoxetine (CYMBALTA) 60 MG capsule Take 60 mg by mouth 2 times daily        EPINEPHrine (EPIPEN/ADRENACLICK/OR ANY BX GENERIC EQUIV) 0.3 MG/0.3ML injection 2-pack Inject 0.3 mLs (0.3 mg) into the muscle once as needed for anaphylaxis 0.6 mL 3     etanercept (ENBREL SURECLICK) 50 MG/ML autoinjector Inject 50 mg Subcutaneous once a week Hold for signs of infection, and seek medical attention. 4 mL 5     famotidine (PEPCID) 20 MG tablet Prior to administration of Humira every 2 weeks (Patient taking differently: Take 20 mg by mouth daily Prior to Enbrel Injections to prevent skin reaction)       fluticasone (FLONASE) 50 MCG/ACT nasal spray Spray 1 spray into both nostrils daily       fluticasone-salmeterol (ADVAIR) 500-50 MCG/DOSE inhaler Inhale 1 puff into the lungs every 12 hours 3 Inhaler 3     glipiZIDE (GLUCOTROL XL) 5 MG 24 hr tablet Take 1 tablet (5 mg) by mouth daily 30 tablet 0     lamoTRIgine (LAMICTAL) 100 MG tablet Take 200 mg by mouth daily       levothyroxine (SYNTHROID/LEVOTHROID) 75 MCG tablet TAKE 1 TABLET EVERY MORNING 90 tablet 0     lidocaine (XYLOCAINE) 5 % external ointment Apply topically 4 times daily as needed (pain) 50 g 2     Liniments (SALONPAS EX) Externally apply 1 Application topically daily as needed       melatonin 3 MG tablet Take 6 mg by mouth nightly as needed for sleep       metoclopramide (REGLAN) 5 MG tablet Take 1 tablet (5 mg) by mouth 3 times daily as needed (FOR NAUSEA) (Patient taking differently: Take 5 mg by mouth 3 times daily as needed (FOR NAUSEA) 10mg in the am and 4pm and 5mg at bedtime) 90 tablet 0     metoprolol succinate ER (TOPROL-XL) 200 MG 24 hr tablet Take 1 tablet (200 mg) by mouth 2 times daily 180 tablet 0     montelukast (SINGULAIR) 10 MG tablet Take 1 tablet (10 mg) by mouth every evening 90 tablet 1     nabumetone (RELAFEN) 500 MG tablet  Take 1-2 tablets (500-1,000 mg) by mouth 2 times daily (with meals) as needed for back/joint pain. 360 tablet 0     naloxone (NARCAN) 4 MG/0.1ML nasal spray Spray 1 spray (4 mg) into one nostril alternating nostrils as needed for opioid reversal every 2-3 minutes until assistance arrives 0.2 mL 0     omega-3 acid ethyl esters (LOVAZA) 1 g capsule TAKE 2 CAPSULES BY MOUTH TWICE DAILY. 360 capsule 1     omeprazole 20 MG tablet Take 20 mg by mouth daily        order for DME Equipment being ordered: Assure Compression stockings (ANNETTA) Large, closed toe, thigh-high 30-40 compression 2 Units 3     order for DME Jobst thigh high hose:  30-40 mmHg    Equipment being ordered: Jobst thigh high hose 30-40 mmHg 1 Units 1     order for DME Equipment being ordered: lumbosacral belt/brace 1 Units 0     order for DME Respironics REMSTAR 60 Series Auto CPAP 9-13 cm H2O, Wisp nasal mask w/a large cushion and a chinstrap       oxyCODONE (ROXICODONE) 5 MG tablet Take 1-2 tablets (5-10 mg) by mouth every 6 hours as needed for pain (maximum 4 tablet(s) per day) 35 tablet 0     pregabalin (LYRICA) 150 MG capsule Take 1 capsule (150 mg) by mouth 3 times daily (Patient taking differently: Take 150 mg by mouth 2 times daily ) 270 capsule 1     QUEtiapine (SEROQUEL) 25 MG tablet Taking 75 MG @ HS per pt report       ramipril (ALTACE) 10 MG capsule Take 1 capsule (10 mg) by mouth daily 90 capsule 1     Rectal Protectant-Emollient (CALMOL-4) 76-10 % SUPP Place 1 suppository rectally 3 times daily       rizatriptan (MAXALT-MLT) 5 MG ODT Take 1 tablet (5 mg) by mouth at onset of headache for migraine 30 tablet 5     rosuvastatin (CRESTOR) 40 MG tablet Take 1 tablet (40 mg) by mouth daily 90 tablet 0     syringe, disposable, (BD TUBERCULIN SYRINGE) 1 ML MISC Equipment being ordered: 1 ml tuberculin syringes to be used for Vitamin B12 injections. 12 each 11     vitamin B complex with vitamin C (STRESS TAB) tablet Take 1 tablet by mouth daily        vitamin C (ASCORBIC ACID) 500 MG tablet Take 500 mg by mouth daily       ZINC SULFATE-VITAMIN C MT Take 1 tablet by mouth daily         Allergies:  Allergies   Allergen Reactions     Amoxicillin-Pot Clavulanate Difficulty breathing     Banana Shortness Of Breath     Pt reports organic Banana is okay.      Nitroglycerin Palpitations     Penicillins Anaphylaxis     Provigil [Modafinil] Shortness Of Breath     headache     Gadolinium Hives and Itching     Patient was premedicated for the contrast allergy. He did still have a reaction a few hours after injection. Hives and itching. Dr. Gomez told tech to inform pt he should only have contrast again in the future when premedicated and at a hospital. Not at an outpatient facility.      Ketoconazole      Topical cream caused swelling and itching     Dye [Contrast Dye] Other (See Comments) and Hives     Moderate flushing, CT contrast     Golimumab      Hives, bradycardia, face swelling     Neurontin [Gabapentin] Hives     Moderate hives     Nortriptyline Hives     Varicella Virus Vaccine Live      Rash     Flagyl [Metronidazole Hcl] Palpitations and Hives     Latex Rash     Metronidazole Palpitations, Other (See Comments) and Rash     dizziness (versus ciprofloxacin taken at same time)     No Clinical Screening - See Comments Rash     Nitrile gloves       Social history:   Social History     Tobacco Use     Smoking status: Never Smoker     Smokeless tobacco: Never Used   Substance Use Topics     Alcohol use: Not Currently     Alcohol/week: 0.0 standard drinks     Drinks per session: 1 or 2     Binge frequency: Weekly     Comment: occ 1/week     Marital status: single.    ROS:  A complete review of systems was performed with the patient and all systems negative except as per HPI.    Physical Examination:  Exam was chaperoned by Sindy Torres.  BP (!) 131/98 (BP Location: Left arm, Patient Position: Sitting, Cuff Size: Adult Large)   Pulse 82   Temp 98.6  F (37  " C) (Oral)   Ht 6' 6\"   Wt 327 lb 11.2 oz   SpO2 96%   BMI 37.87 kg/m    General: Well hydrated. No acute distress.  Abdomen: Soft, NT, ND. No inguinal adenopathy palpated.  Perianal external examination:  Perianal skin: intact.  Lesions: No.  Eversion of buttocks: There was evidence of an anal fissure. Details: posterior midline fissure seen.  Skin tags or external hemorrhoids: No.  Digital rectal examination: Could not be completed to do patient discomfort.    Anoscopy: Could not be completed to do patient discomfort    Procedures:  none.    Laboratory values reviewed:  Recent Labs   Lab Test 10/09/20  1550 03/18/15  0912 03/18/15  0912   WBC 9.6   < >  --    HGB 15.2   < >  --       < >  --    CR 0.77   < >  --    ALBUMIN 4.3   < >  --    BILITOTAL 0.4   < >  --    ALKPHOS 113   < >  --    ALT 53   < >  --    AST 53*   < >  --    INR  --   --  0.90    < > = values in this interval not displayed.       Imaging personally reviewed by me:  none    ASSESSMENT  1. Hx of diverticulitis s/p sigmoid colectomy in 2013.  2. Hx of recurrent perianal abscesses.  3. Posterior midline anal fissure.     PLAN  1. Diltiazem ointment 2% to be applied 3 times daily for 8 weeks  2. Continue benefiber.  3. Drink 10 glasses of water a day  4. Tylenol, ibuprofen, and warm tub baths/sitz baths for pain  5. Follow up in 8 weeks. Follow up sooner if symptoms do not improve after 4 weeks.      Time spent: 30 minutes.  >50% spent in discussing, counseling and coordinating care.    Jeremy Jimenez M.D    Division of Colon and Rectal Surgery  [facility]    Referring Provider:  Referred Self, MD  No address on file     Primary Care Provider:  Ksenia Lyles  "

## 2020-10-14 ENCOUNTER — OFFICE VISIT (OUTPATIENT)
Dept: SURGERY | Facility: CLINIC | Age: 45
End: 2020-10-14
Payer: MEDICARE

## 2020-10-14 VITALS
SYSTOLIC BLOOD PRESSURE: 131 MMHG | BODY MASS INDEX: 36.45 KG/M2 | OXYGEN SATURATION: 96 % | HEART RATE: 82 BPM | HEIGHT: 78 IN | TEMPERATURE: 98.6 F | WEIGHT: 315 LBS | DIASTOLIC BLOOD PRESSURE: 98 MMHG

## 2020-10-14 DIAGNOSIS — R82.998 DARK URINE: ICD-10-CM

## 2020-10-14 DIAGNOSIS — G89.4 CHRONIC PAIN SYNDROME: ICD-10-CM

## 2020-10-14 DIAGNOSIS — E11.8 TYPE 2 DIABETES MELLITUS WITH COMPLICATION, WITHOUT LONG-TERM CURRENT USE OF INSULIN (H): ICD-10-CM

## 2020-10-14 DIAGNOSIS — K60.2 ANAL FISSURE: Primary | ICD-10-CM

## 2020-10-14 LAB
ALBUMIN UR-MCNC: NEGATIVE MG/DL
AMPHETAMINES UR QL: NOT DETECTED NG/ML
APPEARANCE UR: CLEAR
BARBITURATES UR QL SCN: NOT DETECTED NG/ML
BENZODIAZ UR QL SCN: ABNORMAL NG/ML
BILIRUB UR QL STRIP: NEGATIVE
BUPRENORPHINE UR QL: NOT DETECTED NG/ML
CANNABINOIDS UR QL: NOT DETECTED NG/ML
COCAINE UR QL SCN: NOT DETECTED NG/ML
COLOR UR AUTO: YELLOW
D-METHAMPHET UR QL: NOT DETECTED NG/ML
GLUCOSE UR STRIP-MCNC: NEGATIVE MG/DL
HGB UR QL STRIP: NEGATIVE
KETONES UR STRIP-MCNC: NEGATIVE MG/DL
LEUKOCYTE ESTERASE UR QL STRIP: NEGATIVE
METHADONE UR QL SCN: NOT DETECTED NG/ML
NITRATE UR QL: NEGATIVE
OPIATES UR QL SCN: NOT DETECTED NG/ML
OXYCODONE UR QL SCN: NOT DETECTED NG/ML
PCP UR QL SCN: NOT DETECTED NG/ML
PH UR STRIP: 7 PH (ref 5–7)
PROPOXYPH UR QL: NOT DETECTED NG/ML
SOURCE: NORMAL
SP GR UR STRIP: 1.01 (ref 1–1.03)
TRICYCLICS UR QL SCN: NOT DETECTED NG/ML
UROBILINOGEN UR STRIP-ACNC: 0.2 EU/DL (ref 0.2–1)

## 2020-10-14 PROCEDURE — 80306 DRUG TEST PRSMV INSTRMNT: CPT | Performed by: FAMILY MEDICINE

## 2020-10-14 PROCEDURE — 82043 UR ALBUMIN QUANTITATIVE: CPT | Performed by: FAMILY MEDICINE

## 2020-10-14 PROCEDURE — 81003 URINALYSIS AUTO W/O SCOPE: CPT | Performed by: FAMILY MEDICINE

## 2020-10-14 PROCEDURE — 99214 OFFICE O/P EST MOD 30 MIN: CPT | Performed by: SURGERY

## 2020-10-14 RX ORDER — OXYCODONE HYDROCHLORIDE 5 MG/1
5-10 TABLET ORAL EVERY 6 HOURS PRN
Qty: 35 TABLET | Refills: 0 | Status: SHIPPED | OUTPATIENT
Start: 2020-10-14 | End: 2020-11-01

## 2020-10-14 ASSESSMENT — MIFFLIN-ST. JEOR: SCORE: 2494.69

## 2020-10-14 ASSESSMENT — PAIN SCALES - GENERAL: PAINLEVEL: MILD PAIN (3)

## 2020-10-14 NOTE — TELEPHONE ENCOUNTER
Controlled Substance Refill Request for Oxycodone  Problem List Complete:  Yes  Chronic pain syndrome  Problem Detail    Noted:  4/4/2017   Priority:  Medium   Overview Addendum 8/28/2020  9:30 AM by Martell Chaves RN   Patient is followed by Ksenia Lyles MD for ongoing prescription of pain medication.  All refills should only be approved by this provider, or covering partner.     Medication(s): oxy 5.   Maximum quantity per month: 40  Clinic visit frequency required: Q3  months      Controlled substance agreement: 6/23/2020  UDS: Aug 2019     Encounter-Level CSA - 04/04/2017:            Controlled Substance Agreement - Scan on 4/11/2017  1:30 PM : CONTROLLED SUBSTANCE AGREEMENT (below)                   Pain Clinic evaluation in the past: Yes     DIRE Total Score(s):  No flowsheet data found.     Last [place] website verification:  08/28/20    [email or web address]    Previous Version    checked in past 3 months?  Yes 8/28/20   RX [place] ([place]) reviewed:  not reviewed/not due - last done on 8/28/20  [place] profile:  [email or web address]  Last Written Prescription Date:  9/18/20  Last Fill Quantity: 35 tablets  # refills: 0   Last office visit: 8/18/2020 with prescribing provider:  8/18/20   Future Office Visit:   Next 5 appointments (look out 90 days)    Nov 20, 2020  9:00 AM  Return Visit with Oneil Baxter MD  [facility] ([facility] [address] [facility] [id number]  [phone]               Martell Chaves RN, BSN, PHN

## 2020-10-14 NOTE — LETTER
10/14/2020       RE: Joel Pineda  [id number] [address] [place] [address]     Dear Colleague,    Thank you for referring your patient, Joel Pineda, to the [facility] [place] at [facility]. Please see a copy of my visit note below.    Colon and Rectal Surgery Clinic Note    RE: Joel Pineda.  : 1975.  MARY: 10/14/2020.    Reason for visit: anal pain    HPI: 44M with history of diverticular disease s/p sigmoidectomy with Dr. Justice in . He has most recently been seen for recurrent perineal abscess. He has had abscesses in past, most recently drained in local ED in . He was subsequently seen by Dr. Zhu. This healed and he came in for evaluation of underlying cyst or fistula as a cause for recurrent infection. No lesions were seen on exam. It was recommended he continue treatment for pruritis ani, and follow up as needed. He now presents with complaints of sharp pain, intermittent. Had some blood in stool a few months, nothing recently. Occasionally blood on TP. Currently on antibiotic for toe infection. Was having diarrhea due to metformin, improved with glipizide. Also doing baths every other day, calmoseptine helps as well. Unclear if pain with BM. Pain has been ongoing. Takes benefiber which helps. On Etanercept for ankylosing spondylitis.    Last colonoscopy was 9/15/20. Cecal and ascending colon polyps were found, path consistent with TA. EGD was normal.       Medical history:  Past Medical History:   Diagnosis Date     Acne      Acquired hypothyroidism      Allergic state      Ankylosing spondylitis lumbar region (H)      Ankylosing spondylitis of sacral region (H)      Anxiety      Bipolar 2 disorder (H)      Chest pain     Chest pain, regulated w/BP meds. Clear arteries.     Chronic pain      DDD (degenerative disc disease), lumbar      Depressive disorder      Diabetes (H)      Diverticulosis       Facet arthritis of cervical region      Gastroesophageal reflux disease      Hypertension      IBS (irritable bowel syndrome)      Intracranial arachnoid cyst      Polyneuropathy      Pulmonary embolism (H)      Skin exam, screening for cancer 12/3/2013     Sleep apnea      Uncomplicated asthma        Surgical history:  Past Surgical History:   Procedure Laterality Date     BACK SURGERY  10/07    lumbar discectomy L5-S1     COLONOSCOPY      Note: colonoscopy scheduled with [facility] [place] on Friday, 9/4/15     COSMETIC SURGERY  2012    Nose Exterior - functional     GI SURGERY  August 2013    Sigmoidectomy     HERNIA REPAIR, UMBILICAL  8/23/11    Dr. Evan whiting     INCISION AND DRAINAGE, ABSCESS, COMPLEX  8/23/11    umbilical, Dr. Evan Beavers     LAPAROSCOPIC ASSISTED COLECTOMY LEFT (DESCENDING)  8/15/2013    Procedure: LAPAROSCOPIC ASSISTED COLECTOMY LEFT (DESCENDING);  Laparoscopic Hand Assisted Sigmoid Resection, Mobilization of Splenic Fissure, coloproctoscopy, *Latex Free Room* Anesthesia General with Pain block  ;  Surgeon: Aurora Justice MD;  Location: [facility] OR     NERVE SURGERY  8/18/11    RF ablation @ L3-S1 @ [facility]     RECONSTRUCT NOSE AND SEPTUM (FUNCTIONAL)  10/14/2011    Procedure:RECONSTRUCT NOSE AND SEPTUM (FUNCTIONAL); Functional Septorhinoplasty, Turbinate Reduction, ; Surgeon:CEDRIC CUEVAS; Location:[facility] OR     SINUS SURGERY  10/1/01    ethmoidectomy chronic sinusitis       Family history:  Family History   Problem Relation Age of Onset     Musculoskeletal Disorder Mother         back     Anxiety Disorder Mother      Colon Polyps Mother      Ulcerative Colitis Mother         and ischemic small intestine, surgery     Hypertension Mother      Breast Cancer Mother      Osteoporosis Mother      Diabetes Mother         Type 2, Diagnosed in 2014     Depression Mother         Takes Cymbalta to help with chronic pain + depx     Thyroid Disease Mother         Hypothyroidism     Obesity Mother          "Under much better control latter half of 2015     Musculoskeletal Disorder Father         back     Substance Abuse Father      Hypertension Father      Hyperlipidemia Father      Depression Father         Off meds for many years. Seems \"ok\"     Heart Disease Maternal Grandmother      Heart Disease Maternal Grandfather      Psychotic Disorder Paternal Grandfather      Suicide Paternal Grandfather      Depression Paternal Grandfather         Pediatrician. Committed suicide by pistol in 1990.     Musculoskeletal Disorder Brother         back     Depression Brother         Expressed as anger and moodiness     Substance Abuse Brother      Substance Abuse Sister      Depression Sister         Mental Health Therapist, yet no anti-depressants?     Anxiety Disorder Sister         Mental Health Therapist, yet no anti-anxiety meds?     Other Cancer Other         Bladder Cancer - Fatal     Substance Abuse Brother      Colon Cancer No family hx of      Crohn's Disease No family hx of      Anesthesia Reaction No family hx of      Cancer No family hx of         No family history of skin cancer       Medications:  Current Outpatient Medications   Medication Sig Dispense Refill     acetaminophen 500 MG CAPS Take 500 mg by mouth every 4 hours as needed 60 capsule      albuterol (PROAIR HFA/PROVENTIL HFA/VENTOLIN HFA) 108 (90 Base) MCG/ACT inhaler Inhale 2 puffs into the lungs every 4 hours as needed for shortness of breath / dyspnea or wheezing 8.5 g 11     albuterol (PROVENTIL) (2.5 MG/3ML) 0.083% neb solution Take 1 vial (2.5 mg) by nebulization every 6 hours as needed for shortness of breath / dyspnea or wheezing (Patient not taking: Reported on 10/7/2020) 200 mL 3     ALPRAZolam (XANAX) 0.5 MG tablet Take 1 tablet up to three times daily.       aspirin (ASA) 81 MG tablet Take 81 mg by mouth daily        blood glucose (CONTOUR NEXT TEST) test strip Use to test blood sugar 1-2 times daily or as directed. 100 strip 11     cetirizine " (ZYRTEC) 10 MG tablet Take 1 tablet (10 mg) by mouth At Bedtime 30 tablet 11     cholecalciferol (VITAMIN D3) 97957 units (1250 mcg) capsule Take one capsule every two weeks. 24 capsule 1     clindamycin (CLEOCIN) 300 MG capsule Take 1 capsule (300 mg) by mouth 4 times daily for 10 days 40 capsule 0     cyanocobalamin (CYANOCOBALAMIN) 1000 MCG/ML injection Inject 1 mL (1,000 mcg) into the muscle every 30 days 10 mL 0     DULoxetine (CYMBALTA) 60 MG capsule Take 60 mg by mouth 2 times daily        EPINEPHrine (EPIPEN/ADRENACLICK/OR ANY BX GENERIC EQUIV) 0.3 MG/0.3ML injection 2-pack Inject 0.3 mLs (0.3 mg) into the muscle once as needed for anaphylaxis 0.6 mL 3     etanercept (ENBREL SURECLICK) 50 MG/ML autoinjector Inject 50 mg Subcutaneous once a week Hold for signs of infection, and seek medical attention. 4 mL 5     famotidine (PEPCID) 20 MG tablet Prior to administration of Humira every 2 weeks (Patient taking differently: Take 20 mg by mouth daily Prior to Enbrel Injections to prevent skin reaction)       fluticasone (FLONASE) 50 MCG/ACT nasal spray Spray 1 spray into both nostrils daily       fluticasone-salmeterol (ADVAIR) 500-50 MCG/DOSE inhaler Inhale 1 puff into the lungs every 12 hours 3 Inhaler 3     glipiZIDE (GLUCOTROL XL) 5 MG 24 hr tablet Take 1 tablet (5 mg) by mouth daily 30 tablet 0     lamoTRIgine (LAMICTAL) 100 MG tablet Take 200 mg by mouth daily       levothyroxine (SYNTHROID/LEVOTHROID) 75 MCG tablet TAKE 1 TABLET EVERY MORNING 90 tablet 0     lidocaine (XYLOCAINE) 5 % external ointment Apply topically 4 times daily as needed (pain) 50 g 2     Liniments (SALONPAS EX) Externally apply 1 Application topically daily as needed       melatonin 3 MG tablet Take 6 mg by mouth nightly as needed for sleep       metoclopramide (REGLAN) 5 MG tablet Take 1 tablet (5 mg) by mouth 3 times daily as needed (FOR NAUSEA) (Patient taking differently: Take 5 mg by mouth 3 times daily as needed (FOR NAUSEA) 10mg  in the am and 4pm and 5mg at bedtime) 90 tablet 0     metoprolol succinate ER (TOPROL-XL) 200 MG 24 hr tablet Take 1 tablet (200 mg) by mouth 2 times daily 180 tablet 0     montelukast (SINGULAIR) 10 MG tablet Take 1 tablet (10 mg) by mouth every evening 90 tablet 1     nabumetone (RELAFEN) 500 MG tablet Take 1-2 tablets (500-1,000 mg) by mouth 2 times daily (with meals) as needed for back/joint pain. 360 tablet 0     naloxone (NARCAN) 4 MG/0.1ML nasal spray Spray 1 spray (4 mg) into one nostril alternating nostrils as needed for opioid reversal every 2-3 minutes until assistance arrives 0.2 mL 0     omega-3 acid ethyl esters (LOVAZA) 1 g capsule TAKE 2 CAPSULES BY MOUTH TWICE DAILY. 360 capsule 1     omeprazole 20 MG tablet Take 20 mg by mouth daily        order for DME Equipment being ordered: Assure Compression stockings (ANNETTA) Large, closed toe, thigh-high 30-40 compression 2 Units 3     order for DME Jobst thigh high hose:  30-40 mmHg    Equipment being ordered: Jobst thigh high hose 30-40 mmHg 1 Units 1     order for DME Equipment being ordered: lumbosacral belt/brace 1 Units 0     order for DME Respironics REMSTAR 60 Series Auto CPAP 9-13 cm H2O, Wisp nasal mask w/a large cushion and a chinstrap       oxyCODONE (ROXICODONE) 5 MG tablet Take 1-2 tablets (5-10 mg) by mouth every 6 hours as needed for pain (maximum 4 tablet(s) per day) 35 tablet 0     pregabalin (LYRICA) 150 MG capsule Take 1 capsule (150 mg) by mouth 3 times daily (Patient taking differently: Take 150 mg by mouth 2 times daily ) 270 capsule 1     QUEtiapine (SEROQUEL) 25 MG tablet Taking 75 MG @ HS per pt report       ramipril (ALTACE) 10 MG capsule Take 1 capsule (10 mg) by mouth daily 90 capsule 1     Rectal Protectant-Emollient (CALMOL-4) 76-10 % SUPP Place 1 suppository rectally 3 times daily       rizatriptan (MAXALT-MLT) 5 MG ODT Take 1 tablet (5 mg) by mouth at onset of headache for migraine 30 tablet 5     rosuvastatin (CRESTOR) 40 MG  tablet Take 1 tablet (40 mg) by mouth daily 90 tablet 0     syringe, disposable, (BD TUBERCULIN SYRINGE) 1 ML MISC Equipment being ordered: 1 ml tuberculin syringes to be used for Vitamin B12 injections. 12 each 11     vitamin B complex with vitamin C (STRESS TAB) tablet Take 1 tablet by mouth daily       vitamin C (ASCORBIC ACID) 500 MG tablet Take 500 mg by mouth daily       ZINC SULFATE-VITAMIN C MT Take 1 tablet by mouth daily         Allergies:  Allergies   Allergen Reactions     Amoxicillin-Pot Clavulanate Difficulty breathing     Banana Shortness Of Breath     Pt reports organic Banana is okay.      Nitroglycerin Palpitations     Penicillins Anaphylaxis     Provigil [Modafinil] Shortness Of Breath     headache     Gadolinium Hives and Itching     Patient was premedicated for the contrast allergy. He did still have a reaction a few hours after injection. Hives and itching. Dr. Gomez told tech to inform pt he should only have contrast again in the future when premedicated and at a hospital. Not at an outpatient facility.      Ketoconazole      Topical cream caused swelling and itching     Dye [Contrast Dye] Other (See Comments) and Hives     Moderate flushing, CT contrast     Golimumab      Hives, bradycardia, face swelling     Neurontin [Gabapentin] Hives     Moderate hives     Nortriptyline Hives     Varicella Virus Vaccine Live      Rash     Flagyl [Metronidazole Hcl] Palpitations and Hives     Latex Rash     Metronidazole Palpitations, Other (See Comments) and Rash     dizziness (versus ciprofloxacin taken at same time)     No Clinical Screening - See Comments Rash     Nitrile gloves       Social history:   Social History     Tobacco Use     Smoking status: Never Smoker     Smokeless tobacco: Never Used   Substance Use Topics     Alcohol use: Not Currently     Alcohol/week: 0.0 standard drinks     Drinks per session: 1 or 2     Binge frequency: Weekly     Comment: occ 1/week     Marital status:  "single.    ROS:  A complete review of systems was performed with the patient and all systems negative except as per HPI.    Physical Examination:  Exam was chaperoned by Sindy Torres.  BP (!) 131/98 (BP Location: Left arm, Patient Position: Sitting, Cuff Size: Adult Large)   Pulse 82   Temp 98.6  F (37  C) (Oral)   Ht 6' 6\"   Wt 327 lb 11.2 oz   SpO2 96%   BMI 37.87 kg/m    General: Well hydrated. No acute distress.  Abdomen: Soft, NT, ND. No inguinal adenopathy palpated.  Perianal external examination:  Perianal skin: intact.  Lesions: No.  Eversion of buttocks: There was evidence of an anal fissure. Details: posterior midline fissure seen.  Skin tags or external hemorrhoids: No.  Digital rectal examination: Could not be completed to do patient discomfort.    Anoscopy: Could not be completed to do patient discomfort    Procedures:  none.    Laboratory values reviewed:  Recent Labs   Lab Test 10/09/20  1550 03/18/15  0912 03/18/15  0912   WBC 9.6   < >  --    HGB 15.2   < >  --       < >  --    CR 0.77   < >  --    ALBUMIN 4.3   < >  --    BILITOTAL 0.4   < >  --    ALKPHOS 113   < >  --    ALT 53   < >  --    AST 53*   < >  --    INR  --   --  0.90    < > = values in this interval not displayed.       Imaging personally reviewed by me:  none    ASSESSMENT  1. Hx of diverticulitis s/p sigmoid colectomy in 2013.  2. Hx of recurrent perianal abscesses.  3. Posterior midline anal fissure.     PLAN  1. Diltiazem ointment 2% to be applied 3 times daily for 8 weeks  2. Continue benefiber.  3. Drink 10 glasses of water a day  4. Tylenol, ibuprofen, and warm tub baths/sitz baths for pain  5. Follow up in 8 weeks. Follow up sooner if symptoms do not improve after 4 weeks.      Time spent: 30 minutes.  >50% spent in discussing, counseling and coordinating care.    Jeremy Jimenez M.D    Division of Colon and Rectal Surgery  [facility]    Referring " Provider:  Referred Self, MD  No address on file     Primary Care Provider:  Ksenia Lyles      Again, thank you for allowing me to participate in the care of your patient.      Sincerely,    Jeremy Jimenez MD

## 2020-10-14 NOTE — RESULT ENCOUNTER NOTE
Kassy Lyles is out of the office and I am reviewing your results.    Your urine test was normal.   Not all of your lab results are back.   Please call or MyChart my office with any questions or concerns.    Faith Camacho, PAC

## 2020-10-14 NOTE — PATIENT INSTRUCTIONS
Follow up:    1. Diltiazem ointment 2% to be applied 3 times daily for 8 weeks  2. Fiber supplement as you are doing.  3. Drink 10 glasses of water a day to stay hydrated and stools soft.  4. Tylenol, ibuprofen, and warm tub baths/sitz baths for pain  5. Follow up in 8 weeks. Follow up sooner if symptoms do not improve after 4 weeks.      Please call with any questions or concerns regarding your clinic visit today.    It is a pleasure being involved in your health care.    Contacts post-consultation depending on your need:    Radiology Appointments [phone]    Schedule Clinic Appointments [phone] # 1   M-F 7:30 - 5 pm    ESTEFANI Champagne [phone]    Clinic Fax Number [phone]    Surgery Scheduling [phone]    My Chart is available 24 hours a day and is a secure way to access your records and communicate with your care team.  I strongly recommend signing up if you haven't already done so, if you are comfortable with computers.  If you would like to inquire about this or are having problems with My Chart access, you may call [phone] or go online at [email or web address].  Please allow at least 24 hours for a response and extra time on weekends and Holidays.

## 2020-10-14 NOTE — NURSING NOTE
"Chief Complaint   Patient presents with     Procedure     Hemorrhoid banding.       Vitals:    10/14/20 0953   BP: (!) 131/98   BP Location: Left arm   Patient Position: Sitting   Cuff Size: Adult Large   Pulse: 82   Temp: 98.6  F (37  C)   TempSrc: Oral   SpO2: 96%   Weight: 327 lb 11.2 oz   Height: 6' 6\"       Body mass index is 37.87 kg/m .      Sindy Devi, EMT                      "

## 2020-10-15 DIAGNOSIS — R80.9 MICROALBUMINURIA: Primary | ICD-10-CM

## 2020-10-15 LAB
CREAT UR-MCNC: 15 MG/DL
MICROALBUMIN UR-MCNC: 8 MG/L
MICROALBUMIN/CREAT UR: 51.61 MG/G CR (ref 0–17)

## 2020-10-15 NOTE — RESULT ENCOUNTER NOTE
Kassy Lyles is out of the office and I am reviewing your results.    Your urine test shows medications as expected.  Please call or MyChart my office with any questions or concerns.    Faith Camacho, PAC

## 2020-10-16 DIAGNOSIS — E11.8 TYPE 2 DIABETES MELLITUS WITH COMPLICATION, WITHOUT LONG-TERM CURRENT USE OF INSULIN (H): ICD-10-CM

## 2020-10-16 DIAGNOSIS — R80.9 MICROALBUMINURIA: ICD-10-CM

## 2020-10-16 LAB
ANION GAP SERPL CALCULATED.3IONS-SCNC: 5 MMOL/L (ref 3–14)
BUN SERPL-MCNC: 10 MG/DL (ref 7–30)
CALCIUM SERPL-MCNC: 9.4 MG/DL (ref 8.5–10.1)
CHLORIDE SERPL-SCNC: 104 MMOL/L (ref 94–109)
CHOLEST SERPL-MCNC: 155 MG/DL
CO2 SERPL-SCNC: 26 MMOL/L (ref 20–32)
CREAT SERPL-MCNC: 1.28 MG/DL (ref 0.66–1.25)
GFR SERPL CREATININE-BSD FRML MDRD: 66 ML/MIN/{1.73_M2}
GLUCOSE SERPL-MCNC: 114 MG/DL (ref 70–99)
HBA1C MFR BLD: 6 % (ref 0–5.6)
HDLC SERPL-MCNC: 35 MG/DL
LDLC SERPL CALC-MCNC: 45 MG/DL
NONHDLC SERPL-MCNC: 120 MG/DL
POTASSIUM SERPL-SCNC: 4.2 MMOL/L (ref 3.4–5.3)
SODIUM SERPL-SCNC: 135 MMOL/L (ref 133–144)
TRIGL SERPL-MCNC: 373 MG/DL

## 2020-10-16 PROCEDURE — 83036 HEMOGLOBIN GLYCOSYLATED A1C: CPT | Performed by: FAMILY MEDICINE

## 2020-10-16 PROCEDURE — 36415 COLL VENOUS BLD VENIPUNCTURE: CPT | Performed by: FAMILY MEDICINE

## 2020-10-16 PROCEDURE — 80061 LIPID PANEL: CPT | Performed by: FAMILY MEDICINE

## 2020-10-16 PROCEDURE — 80048 BASIC METABOLIC PNL TOTAL CA: CPT | Performed by: FAMILY MEDICINE

## 2020-10-16 NOTE — RESULT ENCOUNTER NOTE
Hi Tito,  Your cholesterol is still high.  Since you have an appointment with Dr. Lyles coming up, I will let him determine if there should be dose adjustments in your cholesterol medication.  Your kidney function is okay but looks a little dry make sure you are drinking enough water.  Your A1c looks good, at 6.0.  Please call if you have questions.  Thank you,  JOCELYN Pro, NP-C  [facility]

## 2020-10-17 ENCOUNTER — MYC MEDICAL ADVICE (OUTPATIENT)
Dept: SLEEP MEDICINE | Facility: CLINIC | Age: 45
End: 2020-10-17

## 2020-10-17 DIAGNOSIS — G47.52 RBD (REM BEHAVIORAL DISORDER): Primary | ICD-10-CM

## 2020-10-19 ENCOUNTER — MYC MEDICAL ADVICE (OUTPATIENT)
Dept: FAMILY MEDICINE | Facility: CLINIC | Age: 45
End: 2020-10-19

## 2020-10-19 DIAGNOSIS — M54.2 NECK PAIN: ICD-10-CM

## 2020-10-19 DIAGNOSIS — M51.369 DDD (DEGENERATIVE DISC DISEASE), LUMBAR: Primary | ICD-10-CM

## 2020-10-21 ENCOUNTER — ANCILLARY PROCEDURE (OUTPATIENT)
Dept: ULTRASOUND IMAGING | Facility: CLINIC | Age: 45
End: 2020-10-21
Attending: FAMILY MEDICINE
Payer: MEDICARE

## 2020-10-21 DIAGNOSIS — R80.9 MICROALBUMINURIA: ICD-10-CM

## 2020-10-21 PROCEDURE — 76770 US EXAM ABDO BACK WALL COMP: CPT | Performed by: RADIOLOGY

## 2020-10-22 ENCOUNTER — TELEPHONE (OUTPATIENT)
Dept: SLEEP MEDICINE | Facility: CLINIC | Age: 45
End: 2020-10-22

## 2020-10-22 NOTE — TELEPHONE ENCOUNTER
Reason for call:  Other   Patient called regarding (reason for call): call back  Additional comments: Patient wanting to schedule sleep study.    Phone number to reach patient:  Home number on file [phone] (home)    Best Time:  Anytime    Can we leave a detailed message on this number?  YES    Travel screening: Not Applicable

## 2020-10-23 ENCOUNTER — VIRTUAL VISIT (OUTPATIENT)
Dept: FAMILY MEDICINE | Facility: CLINIC | Age: 45
End: 2020-10-23
Payer: MEDICARE

## 2020-10-23 DIAGNOSIS — Z20.822 SUSPECTED COVID-19 VIRUS INFECTION: Primary | ICD-10-CM

## 2020-10-23 DIAGNOSIS — E11.8 TYPE 2 DIABETES MELLITUS WITH COMPLICATION, WITHOUT LONG-TERM CURRENT USE OF INSULIN (H): ICD-10-CM

## 2020-10-23 PROCEDURE — 99213 OFFICE O/P EST LOW 20 MIN: CPT | Mod: CS | Performed by: PREVENTIVE MEDICINE

## 2020-10-23 NOTE — PATIENT INSTRUCTIONS
"Discharge Instructions for COVID-19 Patients  You have--or may have--COVID-19. Please follow the instructions listed below.   If you have a weakened immune system, discuss with your doctor any other actions you need to take.  How can I protect others?  If you have symptoms (fever, cough, body aches or trouble breathing):    Stay home and away from others (self-isolate) until:  ? At least 10 days have passed since your symptoms started, And   ? You've had no fever--and no medicine that reduces fever--for 1 full day (24 hours), And    ? Your other symptoms have resolved (gotten better).  If you don't show symptoms, but testing showed that you have COVID-19:    Stay home and away from others (self-isolate). Follow the tips under \"How do I self-isolate?\" below for 10 days (20 days if you have a weak immune system).    You don't need to be retested for COVID-19 before going back to school or work. As long as you're fever-free and feeling better, you can go back to school, work and other activities after waiting the 10 or 20 days.   How do I self-isolate?    Stay in your own room, even for meals. Use your own bathroom if you can.    Stay away from others in your home. No hugging, kissing or shaking hands. No visitors.    Don't go to work, school or anywhere else.    Clean \"high touch\" surfaces often (doorknobs, counters, handles). Use household cleaning spray or wipes. You'll find a full list of  on the EPA website: www.epa.gov/pesticide-registration/list-n-disinfectants-use-against-sars-cov-2.    Cover your mouth and nose with a mask or other face covering to avoid spreading germs.    Wash your hands and face often. Use soap and water.    Caregivers in these groups are at risk for severe illness due to COVID-19:  ? People 65 years and older  ? People who live in a nursing home or long-term care facility  ? People with chronic disease (lung, heart, cancer, diabetes, kidney, liver, immunologic)  ? People who have a " weakened immune system, including those who:    Are in cancer treatment    Take medicine that weakens the immune system, such as corticosteroids    Had a bone marrow or organ transplant    Have an immune deficiency    Have poorly controlled HIV or AIDS    Are obese (body mass index of 40 or higher)    Smoke regularly    Caregivers should wear gloves while washing dishes, handling laundry and cleaning bedrooms and bathrooms.    Use caution when washing and drying laundry: Don't shake dirty laundry and use the warmest water setting that you can.    For more tips on managing your health at home, go to www.cdc.gov/coronavirus/2019-ncov/downloads/10Things.pdf.  How can I take care of myself at home?  1. Get lots of rest. Drink extra fluids (unless a doctor has told you not to).    2. Take Tylenol (acetaminophen) for fever or pain. If you have liver or kidney problems, ask your family doctor if it's okay to take Tylenol.     Adults can take either:  ? 650 mg (two 325 mg pills) every 4 to 6 hours, or   ? 1,000 mg (two 500 mg pills) every 8 hours as needed.  ? Note: Don't take more than 3,000 mg in one day. Acetaminophen is found in many medicines (both prescribed and over-the-counter medicines). Read all labels to be sure you don't take too much.   For children, check the Tylenol bottle for the right dose. The dose is based on the child's age or weight.  3. If you have other health problems (like cancer, heart failure, an organ transplant or severe kidney disease): Call your specialty clinic if you don't feel better in the next 2 days.    4. Know when to call 911. Emergency warning signs include:  ? Trouble breathing or shortness of breath  ? Pain or pressure in the chest that doesn't go away  ? Feeling confused like you haven't felt before, or not being able to wake up  ? Bluish-colored lips or face    5. Your doctor may have prescribed a blood thinner medicine. Follow their instructions.  Where can I get more  information?    [facility] - About COVID-19: [email or web address]    CDC - What to Do If You're Sick: www.cdc.gov/coronavirus/2019-ncov/about/steps-when-sick.html    CDC - Ending Home Isolation: www.cdc.gov/coronavirus/2019-ncov/hcp/disposition-in-home-patients.html    CDC - Caring for Someone: www.cdc.gov/coronavirus/2019-ncov/if-you-are-sick/care-for-someone.html    [facility] - Interim Guidance for Hospital Discharge to Home: www.health.[facility].[place]./diseases/coronavirus/hcp/hospdischarge.pdf    [facility] clinical trials (COVID-19 research studies): [email or web address]    Below are the COVID-19 hotlines at the [place] Department of Health ([facility]). Interpreters are available.  ? For health questions: Call 080-783-7336 or 1-946.829.6893 (7 a.m. to 7 p.m.)  ? For questions about schools and childcare: Call 613-263-4073 or 1-556.707.7979 (7 a.m. to 7 p.m.)    For informational purposes only. Not to replace the advice of your health care provider. Clinically reviewed by the Infection Prevention Team. Copyright   2020 [employer]. All rights reserved. [employer] [id number] - REV 08/04/20.

## 2020-10-23 NOTE — TELEPHONE ENCOUNTER
Patient with continued dream enactment.  Now that we can do titration PSGs and we have not yet confirmed RBD I will put an order in.

## 2020-10-23 NOTE — PROGRESS NOTES
"Joel Pineda is a 45 year old male who is being evaluated via a billable video visit.      The patient has been notified of following:     \"This video visit will be conducted via a call between you and your physician/provider. We have found that certain health care needs can be provided without the need for an in-person physical exam.  This service lets us provide the care you need with a video conversation.  If a prescription is necessary we can send it directly to your pharmacy.  If lab work is needed we can place an order for that and you can then stop by our lab to have the test done at a later time.    Video visits are billed at different rates depending on your insurance coverage.  Please reach out to your insurance provider with any questions.    If during the course of the call the physician/provider feels a video visit is not appropriate, you will not be charged for this service.\"    Patient has given verbal consent for Video visit? Yes  How would you like to obtain your AVS? MyChart  If you are dropped from the video visit, the video invite should be resent to: Send to e-mail at: [email or web address]  Will anyone else be joining your video visit? No      Subjective     Joel Pineda is a 45 year old male who presents today via video visit for the following health issues:    HPI        New sudden onset loss of taste today, some loss of smell also.   Has been going for Physical therapy, and was masked for these visits, but was told the  tested positive  No sick contacts  Feels 80% loss of taste  Some fatigue  No fever  Headache+  Sore throat+  Takes Maxalt for headache and self cares  Pulse ox was 98%    History of Type 2 Diabetes+  Recent increase in Creatinine, had US Renal done. I reviewed the results with the patient, he will also discuss further with his PCP Dr. Lyles     Video Start Time: 4:05 PM    Review of Systems   Constitutional, HEENT, cardiovascular, pulmonary, gi and gu " "systems are negative, except as otherwise noted.      Objective           Vitals:  No vitals were obtained today due to virtual visit.    Physical Exam     GENERAL: Healthy, alert and no distress  EYES: Eyes grossly normal to inspection.  No discharge or erythema, or obvious scleral/conjunctival abnormalities.  RESP: No audible wheeze, cough, or visible cyanosis.  No visible retractions or increased work of breathing.    SKIN: Visible skin clear. No significant rash, abnormal pigmentation or lesions.  NEURO: Cranial nerves grossly intact.  Mentation and speech appropriate for age.  PSYCH: Mentation appears normal, affect normal/bright, judgement and insight intact, normal speech and appearance well-groomed.      No results found. However, due to the size of the patient record, not all encounters were searched. Please check Results Review for a complete set of results.        Assessment & Plan     Diagnoses and all orders for this visit:    Suspected COVID-19 virus infection  -Covid PCr ordered    Discharge Instructions for COVID-19 Patients  You have--or may have--COVID-19. Please follow the instructions listed below.   If you have a weakened immune system, discuss with your doctor any other actions you need to take.  How can I protect others?  If you have symptoms (fever, cough, body aches or trouble breathing):    Stay home and away from others (self-isolate) until:  ? At least 10 days have passed since your symptoms started, And   ? You've had no fever--and no medicine that reduces fever--for 1 full day (24 hours), And    ? Your other symptoms have resolved (gotten better).  If you don't show symptoms, but testing showed that you have COVID-19:    Stay home and away from others (self-isolate). Follow the tips under \"How do I self-isolate?\" below for 10 days (20 days if you have a weak immune system).    You don't need to be retested for COVID-19 before going back to school or work. As long as you're fever-free " "and feeling better, you can go back to school, work and other activities after waiting the 10 or 20 days.   How do I self-isolate?    Stay in your own room, even for meals. Use your own bathroom if you can.    Stay away from others in your home. No hugging, kissing or shaking hands. No visitors.    Don't go to work, school or anywhere else.    Clean \"high touch\" surfaces often (doorknobs, counters, handles). Use household cleaning spray or wipes. You'll find a full list of  on the EPA website: www.epa.gov/pesticide-registration/list-n-disinfectants-use-against-sars-cov-2.    Cover your mouth and nose with a mask or other face covering to avoid spreading germs.    Wash your hands and face often. Use soap and water.    Caregivers in these groups are at risk for severe illness due to COVID-19:  ? People 65 years and older  ? People who live in a nursing home or long-term care facility  ? People with chronic disease (lung, heart, cancer, diabetes, kidney, liver, immunologic)  ? People who have a weakened immune system, including those who:    Are in cancer treatment    Take medicine that weakens the immune system, such as corticosteroids    Had a bone marrow or organ transplant    Have an immune deficiency    Have poorly controlled HIV or AIDS    Are obese (body mass index of 40 or higher)    Smoke regularly    Caregivers should wear gloves while washing dishes, handling laundry and cleaning bedrooms and bathrooms.    Use caution when washing and drying laundry: Don't shake dirty laundry and use the warmest water setting that you can.    For more tips on managing your health at home, go to www.cdc.gov/coronavirus/2019-ncov/downloads/10Things.pdf.  How can I take care of myself at home?  1. Get lots of rest. Drink extra fluids (unless a doctor has told you not to).    2. Take Tylenol (acetaminophen) for fever or pain. If you have liver or kidney problems, ask your family doctor if it's okay to take Tylenol. "     Adults can take either:  ? 650 mg (two 325 mg pills) every 4 to 6 hours, or   ? 1,000 mg (two 500 mg pills) every 8 hours as needed.  ? Note: Don't take more than 3,000 mg in one day. Acetaminophen is found in many medicines (both prescribed and over-the-counter medicines). Read all labels to be sure you don't take too much.   For children, check the Tylenol bottle for the right dose. The dose is based on the child's age or weight.  3. If you have other health problems (like cancer, heart failure, an organ transplant or severe kidney disease): Call your specialty clinic if you don't feel better in the next 2 days.    4. Know when to call 911. Emergency warning signs include:  ? Trouble breathing or shortness of breath  ? Pain or pressure in the chest that doesn't go away  ? Feeling confused like you haven't felt before, or not being able to wake up  ? Bluish-colored lips or face    5. Your doctor may have prescribed a blood thinner medicine. Follow their instructions.  Where can I get more information?    [facility] - About COVID-19: [email or web address]    CDC - What to Do If You're Sick: www.cdc.gov/coronavirus/2019-ncov/about/steps-when-sick.html    CDC - Ending Home Isolation: www.cdc.gov/coronavirus/2019-ncov/hcp/disposition-in-home-patients.html    CDC - Caring for Someone: www.cdc.gov/coronavirus/2019-ncov/if-you-are-sick/care-for-someone.html    [facility] - Interim Guidance for Hospital Discharge to Home: www.health.[facility].[place]/diseases/coronavirus/hcp/hospdischarge.pdf    [facility] clinical trials (COVID-19 research studies): [email or web address]-[email or web address]-trials    Below are the COVID-19 hotlines at the [place] Department of Health ([facility]). Interpreters are available.  ? For health questions: Call 482-160-9898 or 1-779.209.2908 (7 a.m. to 7 p.m.)  ? For questions about schools and childcare: Call 764-017-3082 or 1-741.666.7305 (7 a.m. to 7 p.m.)    For informational purposes  only. Not to replace the advice of your health care provider. Clinically reviewed by the Infection Prevention Team. Copyright   2020 [facility]. All rights reserved. [employer] 235164 - REV 08/04/20.      Type 2 diabetes mellitus with complication, without long-term current use of insulin (H)  -well controlled.  -last HbA1C was 6 on 10/16/2020          See Patient Instructions    Return in about 5 days (around 10/28/2020), or if symptoms worsen or fail to improve.    Karrie Root MD MPH    [facility]      Video-Visit Details    Type of service:  Video Visit    Video End Time:4:13 PM    Originating Location (pt. Location): Home    Distant Location (provider location):  [facility]     Platform used for Video Visit: [employer]

## 2020-10-26 ENCOUNTER — VIRTUAL VISIT (OUTPATIENT)
Dept: FAMILY MEDICINE | Facility: CLINIC | Age: 45
End: 2020-10-26
Payer: MEDICARE

## 2020-10-26 DIAGNOSIS — R80.9 MICROALBUMINURIA: Primary | ICD-10-CM

## 2020-10-26 DIAGNOSIS — E78.1 HYPERTRIGLYCERIDEMIA: ICD-10-CM

## 2020-10-26 DIAGNOSIS — I10 ESSENTIAL HYPERTENSION WITH GOAL BLOOD PRESSURE LESS THAN 140/90: ICD-10-CM

## 2020-10-26 DIAGNOSIS — Z20.822 EXPOSURE TO COVID-19 VIRUS: ICD-10-CM

## 2020-10-26 DIAGNOSIS — E78.5 HYPERLIPIDEMIA LDL GOAL <70: ICD-10-CM

## 2020-10-26 DIAGNOSIS — E11.8 TYPE 2 DIABETES MELLITUS WITH COMPLICATION, WITHOUT LONG-TERM CURRENT USE OF INSULIN (H): ICD-10-CM

## 2020-10-26 DIAGNOSIS — K76.0 FATTY INFILTRATION OF LIVER: ICD-10-CM

## 2020-10-26 DIAGNOSIS — R11.0 NAUSEA: ICD-10-CM

## 2020-10-26 PROCEDURE — 99442 PR PHYSICIAN TELEPHONE EVALUATION 11-20 MIN: CPT | Mod: 95 | Performed by: FAMILY MEDICINE

## 2020-10-26 RX ORDER — PROCHLORPERAZINE MALEATE 10 MG
10 TABLET ORAL 3 TIMES DAILY PRN
Qty: 30 TABLET | Refills: 0 | Status: SHIPPED | OUTPATIENT
Start: 2020-10-26 | End: 2020-11-27

## 2020-10-26 NOTE — PROGRESS NOTES
"Joel Pineda is a 45 year old male who is being evaluated via a billable video visit.      The patient has been notified of following:     \"This video visit will be conducted via a call between you and your physician/provider. We have found that certain health care needs can be provided without the need for an in-person physical exam.  This service lets us provide the care you need with a video conversation.  If a prescription is necessary we can send it directly to your pharmacy.  If lab work is needed we can place an order for that and you can then stop by our lab to have the test done at a later time.    Video visits are billed at different rates depending on your insurance coverage.  Please reach out to your insurance provider with any questions.    If during the course of the call the physician/provider feels a video visit is not appropriate, you will not be charged for this service.\"    Patient has given verbal consent for Video visit? Yes  How would you like to obtain your AVS? MyChart  If you are dropped from the video visit, the video invite should be resent to: Text to cell phone: [phone]  Will anyone else be joining your video visit? No    Subjective     Joel Pineda is a 45 year old male who presents today via video visit for the following health issues:    [facility]       Video Start Time: 0958    Video visit with patient today in follow-up of recent renal ultrasound.  Patient had wanted this set up because he was showing some microalbuminuria on his testing.  Results were normal I discussed this with the patient.  There was some fatty liver seen which is a known issue for him.  So we talked about his overall treatment for diabetes, blood pressure, cholesterol, etc.  Things are doing okay from that standpoint.  He was tested a couple of days ago for a potential Covid exposure after developing some mild upper respiratory symptoms but more so because of loss of taste and smell and some nausea.  Does " not have results yet.  But his typical nausea medications are not working quite as well.  No fevers or chills or body aches.    Review of Systems   Constitutional, HEENT, cardiovascular, pulmonary, gi and gu systems are negative, except as otherwise noted.      Objective           Vitals:  No vitals were obtained today due to virtual visit.    Physical Exam     GENERAL: Healthy, alert and no distress  EYES: Eyes grossly normal to inspection.  No discharge or erythema, or obvious scleral/conjunctival abnormalities.  RESP: No audible wheeze, cough, or visible cyanosis.  No visible retractions or increased work of breathing.    SKIN: Visible skin clear. No significant rash, abnormal pigmentation or lesions.  NEURO: Cranial nerves grossly intact.  Mentation and speech appropriate for age.  PSYCH: Mentation appears normal, affect normal/bright, judgement and insight intact, normal speech and appearance well-groomed.      Past labs reviewed with the patient.   Reviewed imaging        Assessment & Plan     Microalbuminuria  Discussed with patient that this really is some early marker of kidney damage and we are not as much concerned about structural issues, especially since we have a negative ultrasound, as we are about keeping diabetes, blood pressure, cholesterol all well controlled.    Type 2 diabetes mellitus with complication, without long-term current use of insulin (H)  A1c has been stable and we are continuing to follow    Essential hypertension with goal blood pressure less than 140/90  Stable on current regimen.  Continue same plan and routine follow-up.     Hyperlipidemia LDL goal <70  Stable on current regimen.  Continue same plan and routine follow-up.     Hypertriglyceridemia  Stable on current regimen.  Continue same plan and routine follow-up.     Fatty infiltration of liver  Stable and following routine liver functions.  No need for continued follow-up of imaging    Exposure to COVID-19 virus  Awaiting  Covid results    Nausea  Discussed mechanism of action of the proposed medication, as well as potential effects, both good and bad.  Patient expressed understanding and agreed with treatment.   - prochlorperazine (COMPAZINE) 10 MG tablet; Take 1 tablet (10 mg) by mouth 3 times daily as needed for nausea or vomiting        See Patient Instructions    Return in about 1 week (around 11/2/2020) for Contact me with progress.    Ksenia Lyles MD  [facility]      Video-Visit Details    Type of service:  Video Visit    Video End Time:1007    Originating Location (pt. Location): Home    Distant Location (provider location):  [facility]     Platform used for Video Visit: [employer]

## 2020-10-27 ENCOUNTER — MYC REFILL (OUTPATIENT)
Dept: FAMILY MEDICINE | Facility: CLINIC | Age: 45
End: 2020-10-27

## 2020-10-27 DIAGNOSIS — E78.5 HYPERLIPIDEMIA LDL GOAL <100: ICD-10-CM

## 2020-10-27 DIAGNOSIS — E11.8 TYPE 2 DIABETES MELLITUS WITH COMPLICATION, WITHOUT LONG-TERM CURRENT USE OF INSULIN (H): ICD-10-CM

## 2020-10-28 RX ORDER — GLIPIZIDE 5 MG/1
5 TABLET, FILM COATED, EXTENDED RELEASE ORAL DAILY
Qty: 30 TABLET | Refills: 0 | Status: SHIPPED | OUTPATIENT
Start: 2020-10-28 | End: 2020-11-23

## 2020-10-28 RX ORDER — ROSUVASTATIN CALCIUM 40 MG/1
40 TABLET, COATED ORAL DAILY
Qty: 90 TABLET | Refills: 0 | Status: SHIPPED | OUTPATIENT
Start: 2020-10-28 | End: 2021-02-28

## 2020-10-28 NOTE — TELEPHONE ENCOUNTER
Routing refill request to provider for review/approval because:  Labs out of range:  Creatinine  Kristin West RN

## 2020-10-29 ENCOUNTER — TELEPHONE (OUTPATIENT)
Dept: FAMILY MEDICINE | Facility: CLINIC | Age: 45
End: 2020-10-29

## 2020-10-29 ENCOUNTER — MYC MEDICAL ADVICE (OUTPATIENT)
Dept: RHEUMATOLOGY | Facility: CLINIC | Age: 45
End: 2020-10-29

## 2020-10-29 NOTE — TELEPHONE ENCOUNTER
Plan does not cover Microspacer?.  Please go to [email or web address] to initiate Prior Auth or change med.    Insurance type and ID number: Key:  [id number]      Additional Information:     Preethi Rodriguez

## 2020-10-29 NOTE — TELEPHONE ENCOUNTER
Spoke with [facility] pharmacy, they state they did not send us anything for this pt, and they dont see that a PA is needed for anything    They also do not know what a microspacer is.    Cheryl AGUILAR, Patient Care

## 2020-10-30 ENCOUNTER — TELEPHONE (OUTPATIENT)
Dept: FAMILY MEDICINE | Facility: CLINIC | Age: 45
End: 2020-10-30

## 2020-11-02 ENCOUNTER — TELEPHONE (OUTPATIENT)
Dept: RHEUMATOLOGY | Facility: CLINIC | Age: 45
End: 2020-11-02

## 2020-11-02 NOTE — TELEPHONE ENCOUNTER
Free Drug Application Initiated  Medication-enbrel  Sponsor-amgen  Additional Information-dr portion and PA received by program however, No pt portion as of 01/21.  Once patient sends in his portion, we will need to resend provider portion and pa since it has been over 45 days since we last sent anything in

## 2020-11-11 ENCOUNTER — TELEPHONE (OUTPATIENT)
Dept: SLEEP MEDICINE | Facility: CLINIC | Age: 45
End: 2020-11-11

## 2020-11-11 DIAGNOSIS — Z20.822 COVID-19 RULED OUT: ICD-10-CM

## 2020-11-11 PROCEDURE — U0003 INFECTIOUS AGENT DETECTION BY NUCLEIC ACID (DNA OR RNA); SEVERE ACUTE RESPIRATORY SYNDROME CORONAVIRUS 2 (SARS-COV-2) (CORONAVIRUS DISEASE [COVID-19]), AMPLIFIED PROBE TECHNIQUE, MAKING USE OF HIGH THROUGHPUT TECHNOLOGIES AS DESCRIBED BY CMS-2020-01-R: HCPCS | Performed by: PSYCHIATRY & NEUROLOGY

## 2020-11-11 NOTE — TELEPHONE ENCOUNTER
Reason for call:  Other   Patient called regarding (reason for call): Safety questions  Additional comments: Pt called in regards to having questions about the safety of the bed when he does his sleep study. Pt has been hospitalized in past for injury pertaining to jumping out of sleep and falling.    Phone number to reach patient:  Home number on file [phone] (home)    Best Time:  Anytime    Can we leave a detailed message on this number?  YES    Travel screening: Not Applicable

## 2020-11-12 LAB
SARS-COV-2 RNA SPEC QL NAA+PROBE: NOT DETECTED
SPECIMEN SOURCE: NORMAL

## 2020-11-16 ENCOUNTER — THERAPY VISIT (OUTPATIENT)
Dept: SLEEP MEDICINE | Facility: CLINIC | Age: 45
End: 2020-11-16
Payer: MEDICARE

## 2020-11-16 DIAGNOSIS — G47.52 RBD (REM BEHAVIORAL DISORDER): ICD-10-CM

## 2020-11-16 PROCEDURE — 95811 POLYSOM 6/>YRS CPAP 4/> PARM: CPT | Performed by: INTERNAL MEDICINE

## 2020-11-17 NOTE — PATIENT INSTRUCTIONS
[facility]    1. Your sleep study will be reviewed by a sleep physician within the next few days.     2. Please follow up in the sleep clinic as scheduled, or, make an appointment with your sleep provider to be seen within two weeks to discuss the results of the sleep study.    3. If you have any questions or problems with your treatment plan, please contact your sleep clinic provider at [phone] to further manage your condition.    4. Please review your attached medication list, and, at your follow-up appointment advise your sleep clinic provider about any changes.    5. Go to [email or web address] for more information about your sleep problems.    Lisa Rodriguez, RPSGT  November 17, 2020

## 2020-11-17 NOTE — PROCEDURES
" SLEEP STUDY INTERPRETATION  PAP TITRATION POLYSOMNOGRAPHY REPORT      Patient: WANG LEE  YOB: 1975  Study Date: 11/16/2020  MRN: [id number]  Referring Provider: Self  Ordering Provider: MD Damico Michael    Indications for Polysomnography: The patient is a 45 year old Male who is 6' 6\" and weighs 315.0 lbs. His BMI is 36.4, [place] sleepiness scale 7 and neck circumference is 48.5 cm. Relevant medical history includes LLOYD, dream enactment. A PAP treatment polysomnogram was performed to evaluate for RBD.    Polysomnogram Data: A full night polysomnogram recorded the standard physiologic parameters including EEG, EOG, EMG, ECG, nasal and oral airflow. Respiratory parameters of chest and abdominal movements were recorded with respiratory inductance plethysmography. Oxygen saturation was recorded by pulse oximetry. Hypopnea scoring rule used: 1B 4%.    Sleep Architecture: Sleep fragmentation  The total recording time of the polysomnogram was 450.0 minutes. The total sleep time was 374.5 minutes. Sleep latency was 28.2 minutes. REM latency was 211.5 minutes. Arousal index was 27.9 arousals per hour. Sleep efficiency was 83.2%. Wake after sleep onset was 46.5 minutes. The patient spent 25.5% of total sleep time in Stage N1, 57.4% in Stage N2, 5.9% in Stage N3, and 11.2% in REM. Time in REM supine was 42.0 minutes.    Respiration: PAP therapy addressed the patients sleep disordered breathing    Events ? The polysomnogram revealed a presence of - obstructive, - central, and - mixed apneas resulting in an apnea index of - events per hour. There were 7 obstructive hypopneas and - central hypopneas resulting in an obstructive hypopnea index of 1.1 and central hypopnea index of - events per hour. The combined apnea/hypopnea index was 1.1 events per hour (central apnea/hypopnea index was - events per hour). The REM AHI was 4.3 events per hour. The supine AHI was 2.7 events per hour. The RERA index was 5.6 " events per hour.  The RDI was 6.7 events per hour.    Snoring - was reported as moderate.    Respiratory rate and pattern - was notable for normal respiratory rate and pattern.    Sustained Sleep Associated Hypoventilation - Transcutaneous carbon dioxide monitoring was not used.    Sleep Associated Hypoxemia - (Greater than 5 minutes O2 sat at or below 88%) was not present. Baseline oxygen saturation was 92.2%. Lowest oxygen saturation was 87.5%. Time spent less than or equal to 88% was 0.1 minutes. Time spent less than or equal to 89% was 2.5 minutes.    Movement Activity: Frequent motor activity throughout sleep. PLM s during NREM sleep, the majority of which were not associated with cortical arousal; Only minimal elevation of motor activity during REM sleep, plausibly masked by melatonin therapy.  Movements consistent with bruxism were also noted.    Periodic Limb Activity - There were 315 PLMs during the entire study. The PLM index was 50.5 movements per hour. The PLM Arousal Index was 3.7 per hour.    REM EMG Activity - REM motor activity was only minimally elevated.     Nocturnal Behavior - Abnormal sleep related behaviors were not clearly noted.    Bruxism - None apparent.    Cardiac Summary: Sinus  The average pulse rate was 63.3 bpm. The minimum pulse rate was 51.9 bpm while the maximum pulse rate was 86.0 bpm.      Assessment:     Frequent motor activity throughout sleep.   o PLM s during NREM sleep, the majority of which were not associated with cortical arousal.  o Only minimal elevation of motor activity during REM sleep, plausibly masked by melatonin therapy.    o Movements consistent with bruxism were also noted.     PAP therapy addressed the patients sleep disordered breathing    Recommendations:    Recommend continued therapy with PAP for treatment of sleep disordered breathing.     Only minimal evidence of increased REM motor tone.  This may be due to masking from melatonin or plausibly presumed  RBD could actually be other increased motor activity (i.e. PLMs in NREM sleep).    o Clinical correlation required.     Recommend screening for dental complications of bruxism and if present recommend referral to sleep dentistry.    Advice regarding the risks of drowsy driving.    Suggest optimizing sleep schedule and avoiding sleep deprivation.    Weight management     Treatment of PLMs (dopaminergic agents or delta-2 ligands) should be targeted at patients who either have wakeful motor restlessness or those in whom there is a high clinical suspicion of periodic limb movement disorder and not for elevated PLMs alone    Diagnostic Codes: G47.33, G47.9      Joel Damico MD 11-

## 2020-11-17 NOTE — PROGRESS NOTES
Completed a all night titration PSG per provider order.    Preliminary AHI n/a.  A final therapeutic PAP pressure was achieved.    Supine REM was seen on therapeutic pressure.    Patient reports feeling refreshed in AM.

## 2020-11-19 ENCOUNTER — MYC MEDICAL ADVICE (OUTPATIENT)
Dept: FAMILY MEDICINE | Facility: CLINIC | Age: 45
End: 2020-11-19

## 2020-11-19 NOTE — PROGRESS NOTES
"Joel Pineda is a 45 year old male who is being evaluated via a billable video visit.      The patient has been notified of following:     \"This video visit will be conducted via a call between you and your physician/provider. We have found that certain health care needs can be provided without the need for an in-person physical exam.  This service lets us provide the care you need with a video conversation.  If a prescription is necessary we can send it directly to your pharmacy.  If lab work is needed we can place an order for that and you can then stop by our lab to have the test done at a later time.    Video visits are billed at different rates depending on your insurance coverage.  Please reach out to your insurance provider with any questions.    If during the course of the call the physician/provider feels a video visit is not appropriate, you will not be charged for this service.\"    Patient has given verbal consent for Video visit? Yes  How would you like to obtain your AVS? MyChart  If you are dropped from the video visit, the video invite should be resent to: Text to cell phone: [phone]  Will anyone else be joining your video visit? No      Mackenzie Cavazos CMA Rheumatology  11/19/2020 12:21 PM    Rheumatology Video Visit      Joel Pineda MRN# [id number]   YOB: 1975 Age: 45 year old      Date of visit: 11/20/20   PCP: Dr. Ksenia Lyles    Chief Complaint   Patient presents with:  Ankylosing spondylitis    Assessment and Plan     1.  Ankylosing spondylitis: Many years of inflammatory back pain but no clear sacroiliitis seen on x-rays or MRIs; then had a lumbar spine x-ray on 2/23/2018 at [facility] showing \"Moderate L5-S1 and mild L4-5 degenerative disc disease and degenerative facet arthropathy. No evidence for fracture, subluxation, or spondylolisthesis. Chronic bridging enthesophyte across the lower right SI joint with irregularity of the joint space suspicious for sequelae of chronic " "inflammatory sacroiliitis. Correlate clinically.\"  This is complicated by a history of back surgery and degenerative changes.  HLA-B27 positive per record review but the actual lab report has not been seen.  He has a personal history of diverticulitis requiring sigmoidectomy.  Reportedly his mother has ulcerative colitis. Based on his symptoms, the x-ray results, and HLA-B27 positive, it is most likely ankylosing spondylitis and Remicade was started with improvement of lower back pain and resolution of lower back stiffness. However, Remicade was also associated with increased infections so it was changed to Simponi; Simponi was associated with hives, nausea, dizziness, and drowsiness, Humira (effective but associated with a sensation of burning on his tongue, and longstanding nausea that didn't seem related to me but did to Mr. Pineda). Has responded to TNF inhibitors so Enbrel was used and is doing well on it; no inflammatory back symptoms at this time.    - Continue Enbrel 50mg SQ every 7 days     2. Mechanical back pain: seeing NSGY.      3. Obesity; reported hx of fatty liver disease: encouraged weight loss and discussed the health benefit    4. Neck pain: improving with PT exercises    # Relevant labs and imaging were reviewed with the patient    # High risk medication toxicity monitoring: discussion and labs reviewed; appropriate labs ordered. See above.  Instructed that if confirmed to have COVID-19 or exposure to someone with confirmed COVID-19 to call this clinic for directions on DMARD management.    # Note that this is a virtual visit to reduce the risk of COVID-19 exposure during this current pandemic.      # Considered to be at high risk of complications from the COVID-19 virus.  It is recommended to limit contact with other people and if possible to work remotely or provide a leave of absence to reduce the risk for COVID-19.      Mr. Pineda verbalized agreement with and understanding of the rational for " the diagnosis and treatment plan.  All questions were answered to best of my ability and the patient's satisfaction. Mr. Pineda was advised to contact the clinic with any questions that may arise after the clinic visit.      Thank you for involving me in the care of the patient    Return to clinic: 3-4 months      HPI   Joel Pineda is a 45 year old male with a past medical history significant for hypertension, hyperlipidemia, asthma, history of pulmonary embolism, history of diverticulitis requiring sigmoidectomy, GERD, obstructive sleep apnea, bipolar disorder, hypothyroidism, B12 deficiency, CKD? (reportedly issue with contrast) and chronic pain syndrome who presents for follow-up of ankylosing spondylitis.    Today, 11/20/2020: doing well.  Tolerating Enbrel well.  Wrist pain resolves with relafen and sometimes requires wrist splints; reportedly stable for a decade and not changed with DMARDs.  Some neck crepitation and has been doing to PT at [facility] and the pain management clinic.  L-spine pain where he has had surgery and doing home exercises for this. Sleep difficulty and seeing the sleep clinic.     Denies fevers, chills, nausea, vomiting, constipation, or diarrhea. Diarrhea resolved with changing diabetes medication. Reports that the colonoscopy was normal at [facility]. No blood in stool.  No chest pain/pressure, palpitations, or shortness of breath. No LE swelling. No neck pain. No oral or nasal sores.  No rash. No sicca symptoms.     Mother: ulcerative colitis    Tobacco: None  EtOH: None  Drugs: None    ROS   GEN: No fevers, chills, night sweats, or weight change  SKIN: No itching, rashes, sores  HEENT: No oral or nasal ulcers.  CV: No chest pain, pressure, palpitations, or dyspnea on exertion.  PULM: No SOB, wheeze, cough.  GI: See HPI  : No blood in urine.  MSK: See HPI.  NEURO: See HPI  EXT: No LE swelling  PSYCH: See HPI    Active Problem List     Patient Active Problem List   Diagnosis     Major  depressive disorder, recurrent episode (H)     Intermittent asthma     Hyperlipidemia LDL goal <100     Chronic nonallergic rhinitis     Diverticulosis     GERD (gastroesophageal reflux disease)     Anxiety     LLOYD (obstructive sleep apnea)- mild (AHI 11)     Intracranial arachnoid cyst     Facet arthritis of cervical region     Acquired hypothyroidism     Bipolar 2 disorder (H)     Chronic midline low back pain without sciatica     Irritable bowel syndrome with diarrhea     B12 deficiency     Essential hypertension with goal blood pressure less than 140/90     Chronic pain syndrome     Ankylosing spondylitis of sacral region (H)     Morbid obesity due to hypertriglyceridemia (H)     Fatty infiltration of liver     DDD (degenerative disc disease), lumbar     Type 2 diabetes mellitus with complication, without long-term current use of insulin (H)     Peripheral polyneuropathy     History of pulmonary embolism     Ingrown toenail     Hypertriglyceridemia     Gastroparesis     Orthostatic dizziness     POTS (postural orthostatic tachycardia syndrome)     PHN (postherpetic neuralgia)     Dysautonomia (H)     Past Medical History     Past Medical History:   Diagnosis Date     Acne      Acquired hypothyroidism      Allergic state      Ankylosing spondylitis lumbar region (H)      Ankylosing spondylitis of sacral region (H)      Anxiety      Bipolar 2 disorder (H)      Chest pain     Chest pain, regulated w/BP meds. Clear arteries.     Chronic pain      DDD (degenerative disc disease), lumbar      Depressive disorder      Diabetes (H)      Diverticulosis      Facet arthritis of cervical region      Gastroesophageal reflux disease      Hypertension      IBS (irritable bowel syndrome)      Intracranial arachnoid cyst      Polyneuropathy      Pulmonary embolism (H)      Skin exam, screening for cancer 12/3/2013     Sleep apnea      Uncomplicated asthma      Past Surgical History     Past Surgical History:   Procedure  Laterality Date     BACK SURGERY  10/07    lumbar discectomy L5-S1     COLONOSCOPY      Note: colonoscopy scheduled with [facility] on Friday, 9/4/15     COSMETIC SURGERY  2012    Nose Exterior - functional     GI SURGERY  August 2013    Sigmoidectomy     HERNIA REPAIR, UMBILICAL  8/23/11    small, Dr. Evan Beavers     INCISION AND DRAINAGE, ABSCESS, COMPLEX  8/23/11    umbilical, Dr. Evan Beavers     LAPAROSCOPIC ASSISTED COLECTOMY LEFT (DESCENDING)  8/15/2013    Procedure: LAPAROSCOPIC ASSISTED COLECTOMY LEFT (DESCENDING);  Laparoscopic Hand Assisted Sigmoid Resection, Mobilization of Splenic Fissure, coloproctoscopy, *Latex Free Room* Anesthesia General with Pain block  ;  Surgeon: Aurora Justice MD;  Location: [facility] OR     NERVE SURGERY  8/18/11    RF ablation @ L3-S1 @ [facility]     RECONSTRUCT NOSE AND SEPTUM (FUNCTIONAL)  10/14/2011    Procedure:RECONSTRUCT NOSE AND SEPTUM (FUNCTIONAL); Functional Septorhinoplasty, Turbinate Reduction, ; Surgeon:CEDRIC CUEVAS; Location:[facility] OR     SINUS SURGERY  10/1/01    ethmoidectomy chronic sinusitis     Allergy     Allergies   Allergen Reactions     Amoxicillin-Pot Clavulanate Difficulty breathing     Banana Shortness Of Breath     Pt reports organic Banana is okay.      Nitroglycerin Palpitations     Penicillins Anaphylaxis     Provigil [Modafinil] Shortness Of Breath     headache     Gadolinium Hives and Itching     Patient was premedicated for the contrast allergy. He did still have a reaction a few hours after injection. Hives and itching. Dr. Gomez told tech to inform pt he should only have contrast again in the future when premedicated and at a hospital. Not at an outpatient facility.      Ketoconazole      Topical cream caused swelling and itching     Dye [Contrast Dye] Other (See Comments) and Hives     Moderate flushing, CT contrast     Golimumab      Hives, bradycardia, face swelling     Neurontin [Gabapentin] Hives     Moderate hives     Nortriptyline Hives      Varicella Virus Vaccine Live      Rash     Flagyl [Metronidazole Hcl] Palpitations and Hives     Latex Rash     Metronidazole Palpitations, Other (See Comments) and Rash     dizziness (versus ciprofloxacin taken at same time)     No Clinical Screening - See Comments Rash     Nitrile gloves     Current Medication List     Current Outpatient Medications   Medication Sig     acetaminophen 500 MG CAPS Take 500 mg by mouth every 4 hours as needed     albuterol (PROAIR HFA/PROVENTIL HFA/VENTOLIN HFA) 108 (90 Base) MCG/ACT inhaler Inhale 2 puffs into the lungs every 4 hours as needed for shortness of breath / dyspnea or wheezing     ALPRAZolam (XANAX) 0.5 MG tablet Take 1 tablet up to three times daily.     aspirin (ASA) 81 MG tablet Take 81 mg by mouth daily      cetirizine (ZYRTEC) 10 MG tablet Take 1 tablet (10 mg) by mouth At Bedtime     cholecalciferol (VITAMIN D3) 62669 units (1250 mcg) capsule Take one capsule every two weeks.     cyanocobalamin (CYANOCOBALAMIN) 1000 MCG/ML injection Inject 1 mL (1,000 mcg) into the muscle every 30 days     diltiazem 2% in PLO gel To anal opening three times daily.  Use a pea-sized amount.  Store at room temperature.     DULoxetine (CYMBALTA) 60 MG capsule Take 60 mg by mouth 2 times daily      etanercept (ENBREL SURECLICK) 50 MG/ML autoinjector Inject 50 mg Subcutaneous once a week Hold for signs of infection, and seek medical attention.     famotidine (PEPCID) 20 MG tablet Prior to administration of Humira every 2 weeks (Patient taking differently: Take 20 mg by mouth daily Prior to Enbrel Injections to prevent skin reaction)     fluticasone (FLONASE) 50 MCG/ACT nasal spray Spray 1 spray into both nostrils daily     fluticasone-salmeterol (ADVAIR) 500-50 MCG/DOSE inhaler Inhale 1 puff into the lungs every 12 hours     glipiZIDE (GLUCOTROL XL) 5 MG 24 hr tablet Take 1 tablet (5 mg) by mouth daily     lamoTRIgine (LAMICTAL) 100 MG tablet Take 200 mg by mouth daily     levothyroxine  (SYNTHROID/LEVOTHROID) 75 MCG tablet TAKE 1 TABLET EVERY MORNING     lidocaine (XYLOCAINE) 5 % external ointment Apply topically 4 times daily as needed (pain)     melatonin 3 MG tablet Take 6 mg by mouth nightly as needed for sleep     metoprolol succinate ER (TOPROL-XL) 200 MG 24 hr tablet Take 1 tablet (200 mg) by mouth 2 times daily     montelukast (SINGULAIR) 10 MG tablet Take 1 tablet (10 mg) by mouth every evening     nabumetone (RELAFEN) 500 MG tablet Take 1-2 tablets (500-1,000 mg) by mouth 2 times daily (with meals) as needed for back/joint pain.     omega-3 acid ethyl esters (LOVAZA) 1 g capsule TAKE 2 CAPSULES BY MOUTH TWICE DAILY.     omeprazole 20 MG tablet Take 20 mg by mouth daily      oxyCODONE (ROXICODONE) 5 MG tablet Take 1-2 tablets (5-10 mg) by mouth every 6 hours as needed for pain (maximum 4 tablet(s) per day)     pregabalin (LYRICA) 150 MG capsule Take 1 capsule (150 mg) by mouth 3 times daily (Patient taking differently: Take 150 mg by mouth 2 times daily )     prochlorperazine (COMPAZINE) 10 MG tablet Take 1 tablet (10 mg) by mouth 3 times daily as needed for nausea or vomiting     QUEtiapine (SEROQUEL) 25 MG tablet Taking 75 MG @ HS per pt report     ramipril (ALTACE) 10 MG capsule Take 1 capsule (10 mg) by mouth daily     Rectal Protectant-Emollient (CALMOL-4) 76-10 % SUPP Place 1 suppository rectally 3 times daily     rizatriptan (MAXALT-MLT) 5 MG ODT Take 1 tablet (5 mg) by mouth at onset of headache for migraine     rosuvastatin (CRESTOR) 40 MG tablet Take 1 tablet (40 mg) by mouth daily     vitamin B complex with vitamin C (STRESS TAB) tablet Take 1 tablet by mouth daily     ZINC SULFATE-VITAMIN C MT Take 1 tablet by mouth daily     albuterol (PROVENTIL) (2.5 MG/3ML) 0.083% neb solution Take 1 vial (2.5 mg) by nebulization every 6 hours as needed for shortness of breath / dyspnea or wheezing (Patient not taking: Reported on 10/7/2020)     blood glucose (CONTOUR NEXT TEST) test strip  Use to test blood sugar 1-2 times daily or as directed.     EPINEPHrine (EPIPEN/ADRENACLICK/OR ANY BX GENERIC EQUIV) 0.3 MG/0.3ML injection 2-pack Inject 0.3 mLs (0.3 mg) into the muscle once as needed for anaphylaxis     Liniments (SALONPAS EX) Externally apply 1 Application topically daily as needed     metoclopramide (REGLAN) 5 MG tablet Take 1 tablet (5 mg) by mouth 3 times daily as needed (FOR NAUSEA) (Patient taking differently: Take 5 mg by mouth 3 times daily as needed (FOR NAUSEA) 10mg in the am and 4pm and 5mg at bedtime)     naloxone (NARCAN) 4 MG/0.1ML nasal spray Spray 1 spray (4 mg) into one nostril alternating nostrils as needed for opioid reversal every 2-3 minutes until assistance arrives     order for DME Equipment being ordered: Assure Compression stockings (ANNETTA) Large, closed toe, thigh-high 30-40 compression     order for DME Jobst thigh high hose:  30-40 mmHg    Equipment being ordered: Jobst thigh high hose 30-40 mmHg     order for DME Equipment being ordered: lumbosacral belt/brace     order for DME Respironics REMSTAR 60 Series Auto CPAP 9-13 cm H2O, Wisp nasal mask w/a large cushion and a chinstrap     syringe, disposable, (BD TUBERCULIN SYRINGE) 1 ML MISC Equipment being ordered: 1 ml tuberculin syringes to be used for Vitamin B12 injections.     No current facility-administered medications for this visit.          Social History   See HPI    Family History     Family History   Problem Relation Age of Onset     Musculoskeletal Disorder Mother         back     Anxiety Disorder Mother      Colon Polyps Mother      Ulcerative Colitis Mother         and ischemic small intestine, surgery     Hypertension Mother      Breast Cancer Mother      Osteoporosis Mother      Diabetes Mother         Type 2, Diagnosed in 2014     Depression Mother         Takes Cymbalta to help with chronic pain + depx     Thyroid Disease Mother         Hypothyroidism     Obesity Mother         Under much better control  "latter half of 2015     Musculoskeletal Disorder Father         back     Substance Abuse Father      Hypertension Father      Hyperlipidemia Father      Depression Father         Off meds for many years. Seems \"ok\"     Heart Disease Maternal Grandmother      Heart Disease Maternal Grandfather      Psychotic Disorder Paternal Grandfather      Suicide Paternal Grandfather      Depression Paternal Grandfather         Pediatrician. Committed suicide by pistol in 1990.     Musculoskeletal Disorder Brother         back     Depression Brother         Expressed as anger and moodiness     Substance Abuse Brother      Substance Abuse Sister      Depression Sister         Mental Health Therapist, yet no anti-depressants?     Anxiety Disorder Sister         Mental Health Therapist, yet no anti-anxiety meds?     Other Cancer Other         Bladder Cancer - Fatal     Substance Abuse Brother      Colon Cancer No family hx of      Crohn's Disease No family hx of      Anesthesia Reaction No family hx of      Cancer No family hx of         No family history of skin cancer     Physical Exam     Temp Readings from Last 3 Encounters:   10/14/20 98.6  F (37  C) (Oral)   10/09/20 97.7  F (36.5  C) (Oral)   08/18/20 98.2  F (36.8  C) (Oral)     BP Readings from Last 5 Encounters:   10/14/20 (!) 131/98   10/09/20 (!) 155/98   09/03/20 120/80   08/27/20 110/76   08/18/20 119/76     Pulse Readings from Last 1 Encounters:   10/14/20 82     Resp Readings from Last 1 Encounters:   10/09/20 16     Estimated body mass index is 37.87 kg/m  as calculated from the following:    Height as of 10/14/20: 1.981 m (6' 6\").    Weight as of 10/14/20: 148.6 kg (327 lb 11.2 oz).    GEN: NAD. Obese  HEENT: MMM.  Anicteric, noninjected sclera. No obvious external lesions of the ear and nose. Hearing intact.  PULM: No increased work of breathing  MSK:  Hands and wrists without swelling.   SKIN: No rash or jaundice seen  PSYCH: Alert. Appropriate.     Labs / " Imaging (select studies)       RF/CCP  Recent Labs   Lab Test 08/07/15  0846 09/03/14  1232   RHF <20 <20     CBC  Recent Labs   Lab Test 10/09/20  1550 12/27/19  1209 09/26/19  1010   WBC 9.6 8.0 7.7   RBC 4.92 5.20 5.15   HGB 15.2 14.9 15.2   HCT 45.9 45.0 46.2   MCV 93 87 90   RDW 13.2 14.4 13.8    263 262   MCH 30.9 28.7 29.5   MCHC 33.1 33.1 32.9   NEUTROPHIL 51.2 45.0 39.7   LYMPH 35.2 38.1 44.9   MONOCYTE 10.2 13.7 12.2   EOSINOPHIL 2.1 2.4 2.3   BASOPHIL 1.1 0.8 0.9   ANEU 4.9 3.6 3.1   ALYM 3.4 3.0 3.5   KATIE 1.0 1.1 0.9   AEOS 0.2 0.2 0.2   ABAS 0.1 0.1 0.1     CMP  Recent Labs   Lab Test 10/16/20  0824 10/09/20  1550 07/03/20  1251 01/24/20  1637 09/26/19  1010    139 139 136  --    POTASSIUM 4.2 4.1 3.9 4.4  --    CHLORIDE 104 108 104 106  --    CO2 26 22 30 24  --    ANIONGAP 5 8 5 6  --    * 128* 105* 99  --    BUN 10 11 17 14  --    CR 1.28* 0.77 0.84 0.81 0.83   GFRESTIMATED 66 >90 >90 >90 >90   GFRESTBLACK 77 >90 >90 >90 >90   ALYCE 9.4 9.3 9.4 9.2  --    BILITOTAL  --  0.4  --  0.4 0.4   ALBUMIN  --  4.3  --  4.4 4.3   PROTTOTAL  --  7.9  --  7.7 7.7   ALKPHOS  --  113  --  104 107   AST  --  53*  --  60* 48*   ALT  --  53  --  61 53     10/24/2020 [facility] Creatinine 1.01    Calcium/VitaminD  Recent Labs   Lab Test 10/16/20  0824 10/09/20  1550 07/03/20  1251 09/13/19  0943 09/13/19  0943 01/03/19  0844 01/03/19  0844 05/03/17  1456 05/03/17  1456   ALYCE 9.4 9.3 9.4   < >  --    < > 9.6   < > 9.5   VITDT  --   --   --   --  40  --  38  --  30    < > = values in this interval not displayed.     ESR/CRP  Recent Labs   Lab Test 10/09/20  1550 07/03/20  1251 09/26/19  1010 04/01/19  1540   SED  --  4 4 4   CRP <2.9  --  <2.9 <2.9     Hepatitis B  Recent Labs   Lab Test 02/28/18  1157 01/06/15  1028   HBCAB Nonreactive  --    HEPBANG Nonreactive Nonreactive     Hepatitis C  Recent Labs   Lab Test 02/28/18  1157 01/06/15  1028   HCVAB Nonreactive Nonreactive   Assay performance  characteristics have not been established for newborns,   infants, and children       Lyme ab screening  Recent Labs   Lab Test 02/22/19  1457   LYMEGM 0.02     Tuberculosis Screening  Recent Labs   Lab Test 02/28/18  1157 01/06/15  1028   TBRSLT Negative Negative   TBAGN 0.00 0.00       Immunization History     Immunization History   Administered Date(s) Administered     FLU 6-35 months 01/03/2019     Flu, Unspecified 08/01/2012, 09/12/2019, 08/20/2020     Influenza (H1N1) 02/04/2010     Influenza (IIV3) PF 11/11/1999, 10/28/2003, 10/15/2008, 08/21/2012, 08/02/2013, 09/09/2013     Influenza Vaccine IM > 6 months Valent IIV4 10/02/2015, 10/10/2016, 09/27/2017, 09/07/2018, 01/03/2019, 09/12/2019, 01/14/2020     Pneumo Conj 13-V (2010&after) 10/02/2015     Pneumococcal 23 valent 05/15/2009, 10/10/2016     TD (ADULT, 7+) 10/04/2002     TDAP Vaccine (Adacel) 07/16/2010, 01/23/2020     Td (Adult), Adsorbed 10/13/1997, 10/04/2002     Zoster vaccine recombinant adjuvanted (SHINGRIX) 05/29/2019, 09/12/2019          Chart documentation done in part with Dragon Voice recognition Software. Although reviewed after completion, some word and grammatical error may remain.      Video-Visit Details    Type of service:  Video Visit    Video Start Time: 9:15 AM   Video End Time: 9:26 AM    Originating Location (pt. Location): Home , [place]    Distant Location (provider location):  Home    Platform used for Video Visit: Sophia Baxter MD

## 2020-11-20 ENCOUNTER — E-VISIT (OUTPATIENT)
Dept: FAMILY MEDICINE | Facility: CLINIC | Age: 45
End: 2020-11-20
Payer: MEDICARE

## 2020-11-20 ENCOUNTER — VIRTUAL VISIT (OUTPATIENT)
Dept: RHEUMATOLOGY | Facility: CLINIC | Age: 45
End: 2020-11-20
Payer: MEDICARE

## 2020-11-20 DIAGNOSIS — M45.9 ANKYLOSING SPONDYLITIS, UNSPECIFIED SITE OF SPINE (H): ICD-10-CM

## 2020-11-20 DIAGNOSIS — M54.2 NECK PAIN: Primary | ICD-10-CM

## 2020-11-20 PROCEDURE — 99441 PR PHYSICIAN TELEPHONE EVALUATION 5-10 MIN: CPT | Mod: 95 | Performed by: FAMILY MEDICINE

## 2020-11-20 PROCEDURE — 99213 OFFICE O/P EST LOW 20 MIN: CPT | Mod: 95 | Performed by: INTERNAL MEDICINE

## 2020-11-20 RX ORDER — MEDROXYPROGESTERONE ACETATE 150 MG/ML
50 INJECTION, SUSPENSION INTRAMUSCULAR WEEKLY
Qty: 4 ML | Refills: 6 | Status: SHIPPED | OUTPATIENT
Start: 2020-11-20 | End: 2021-03-26

## 2020-11-23 ENCOUNTER — HOSPITAL ENCOUNTER (OUTPATIENT)
Dept: BEHAVIORAL HEALTH | Facility: CLINIC | Age: 45
Discharge: HOME | End: 2020-11-23
Attending: FAMILY MEDICINE | Admitting: FAMILY MEDICINE
Payer: MEDICARE

## 2020-11-23 ENCOUNTER — MYC REFILL (OUTPATIENT)
Dept: FAMILY MEDICINE | Facility: CLINIC | Age: 45
End: 2020-11-23

## 2020-11-23 ENCOUNTER — TELEPHONE (OUTPATIENT)
Dept: FAMILY MEDICINE | Facility: CLINIC | Age: 45
End: 2020-11-23

## 2020-11-23 DIAGNOSIS — M45.8 ANKYLOSING SPONDYLITIS OF SACRAL REGION (H): ICD-10-CM

## 2020-11-23 DIAGNOSIS — R11.0 NAUSEA: ICD-10-CM

## 2020-11-23 DIAGNOSIS — M51.369 DDD (DEGENERATIVE DISC DISEASE), LUMBAR: ICD-10-CM

## 2020-11-23 DIAGNOSIS — E11.8 TYPE 2 DIABETES MELLITUS WITH COMPLICATION, WITHOUT LONG-TERM CURRENT USE OF INSULIN (H): ICD-10-CM

## 2020-11-23 PROCEDURE — 90791 PSYCH DIAGNOSTIC EVALUATION: CPT | Mod: PO | Performed by: SOCIAL WORKER

## 2020-11-23 ASSESSMENT — COLUMBIA-SUICIDE SEVERITY RATING SCALE - C-SSRS
6. HAVE YOU EVER DONE ANYTHING, STARTED TO DO ANYTHING, OR PREPARED TO DO ANYTHING TO END YOUR LIFE?: NO
2. HAVE YOU ACTUALLY HAD ANY THOUGHTS OF KILLING YOURSELF?: NO
6. HAVE YOU EVER DONE ANYTHING, STARTED TO DO ANYTHING, OR PREPARED TO DO ANYTHING TO END YOUR LIFE?: NO
5. HAVE YOU STARTED TO WORK OUT OR WORKED OUT THE DETAILS OF HOW TO KILL YOURSELF? DO YOU INTEND TO CARRY OUT THIS PLAN?: NO
TOTAL  NUMBER OF INTERRUPTED ATTEMPTS PAST 3 MONTHS: NO
ATTEMPT PAST THREE MONTHS: NO
REASONS FOR IDEATION PAST MONTH: COMPLETELY TO END OR STOP THE PAIN (YOU COULDN'T GO ON LIVING WITH THE PAIN OR HOW YOU WERE FEELING)
TOTAL  NUMBER OF INTERRUPTED ATTEMPTS LIFETIME: NO
2. HAVE YOU ACTUALLY HAD ANY THOUGHTS OF KILLING YOURSELF LIFETIME?: NO
4. HAVE YOU HAD THESE THOUGHTS AND HAD SOME INTENTION OF ACTING ON THEM?: NO
5. HAVE YOU STARTED TO WORK OUT OR WORKED OUT THE DETAILS OF HOW TO KILL YOURSELF? DO YOU INTEND TO CARRY OUT THIS PLAN?: NO
4. HAVE YOU HAD THESE THOUGHTS AND HAD SOME INTENTION OF ACTING ON THEM?: NO
TOTAL  NUMBER OF ABORTED OR SELF INTERRUPTED ATTEMPTS PAST LIFETIME: NO
1. IN THE PAST MONTH, HAVE YOU WISHED YOU WERE DEAD OR WISHED YOU COULD GO TO SLEEP AND NOT WAKE UP?: YES
1. IN THE PAST MONTH, HAVE YOU WISHED YOU WERE DEAD OR WISHED YOU COULD GO TO SLEEP AND NOT WAKE UP?: YES
TOTAL  NUMBER OF ABORTED OR SELF INTERRUPTED ATTEMPTS PAST 3 MONTHS: NO
ATTEMPT LIFETIME: NO
3. HAVE YOU BEEN THINKING ABOUT HOW YOU MIGHT KILL YOURSELF?: NO

## 2020-11-23 ASSESSMENT — ANXIETY QUESTIONNAIRES
IF YOU CHECKED OFF ANY PROBLEMS ON THIS QUESTIONNAIRE, HOW DIFFICULT HAVE THESE PROBLEMS MADE IT FOR YOU TO DO YOUR WORK, TAKE CARE OF THINGS AT HOME, OR GET ALONG WITH OTHER PEOPLE: VERY DIFFICULT
GAD7 TOTAL SCORE: 13
5. BEING SO RESTLESS THAT IT IS HARD TO SIT STILL: SEVERAL DAYS
4. TROUBLE RELAXING: MORE THAN HALF THE DAYS
6. BECOMING EASILY ANNOYED OR IRRITABLE: MORE THAN HALF THE DAYS
7. FEELING AFRAID AS IF SOMETHING AWFUL MIGHT HAPPEN: MORE THAN HALF THE DAYS
3. WORRYING TOO MUCH ABOUT DIFFERENT THINGS: NEARLY EVERY DAY
1. FEELING NERVOUS, ANXIOUS, OR ON EDGE: SEVERAL DAYS
2. NOT BEING ABLE TO STOP OR CONTROL WORRYING: MORE THAN HALF THE DAYS

## 2020-11-23 ASSESSMENT — PATIENT HEALTH QUESTIONNAIRE - PHQ9: SUM OF ALL RESPONSES TO PHQ QUESTIONS 1-9: 18

## 2020-11-23 NOTE — PATIENT INSTRUCTIONS
"  Outpatient Mental Health Services - Adult    MY COPING PLAN FOR SAFETY    PATIENT'S NAME: Joel Pineda  MRN:   [id number]    SAFETY PLAN:    Step 1: Warning signs / cues (Thoughts, images, mood, situation, behavior) that a crisis may be developing:      Thoughts: \"I can't do this anymore\" and \"I just want this to end\"    Images: flashbacks    Thinking Processes: ruminations (can't stop thinking about my problems)    Mood: worsening depression and intense worry    Behaviors: isolating/withdrawing , not taking care of myself and not taking care of my responsibilities    Situations: feeling isolated, medical problems       Step 2: Coping strategies - Things I can do to take my mind off of my problems without contacting another person (relaxation technique, physical activity):      Distress Tolerance Strategies:  Read, Watch TV    Physical Activities: Play with my cat    Focus on helpful thoughts:  \"This is temporary\" and \"I will get through this\"    Step 3: People and social settings that provide distraction:     Name:  Keyana (sister) Phone:        Step 4: Remind myself of people and things that are important to me and worth living for:  \"my connection with my mom, my connection with my cat, having the connection with my friend from high school, not wanting to cause other people pain\"     Step 5: When I am in crisis, I can ask these people to help me use my safety plan:     \"I would call [facility]\"     Step 6: Making the environment safe:       None    Step 7: Professionals or agencies I can contact during a crisis:      Suicide Prevention Lifeline: 6-491-453-TALK (0780)    Crisis Text Line Service (available 24 hours a day, 7 days a week): Text [place] to 779200    Call  **CRISIS (922107) from a cell phone to talk to a team of professionals who can help you.    Crisis Services By County: Phone Number:   [place]     [phone]   [place]    [phone]   [place]    [phone]   [place]    [phone]   [place]    " [phone]   [place] [phone]   [place]     [phone]       Call 911 or go to my nearest emergency department.     I helped develop this safety plan and agree to use it when needed.  I have been given a copy of this plan.        Today s date:  11/23/2020  Adapted from Safety Plan Template 2008 Shanita Lazcano and Ariel Lorenzo is reprinted with the express permission of the authors.  No portion of the Safety Plan Template may be reproduced without the express, written permission.  You can contact the authors at [email or web address] or [email or web address]

## 2020-11-23 NOTE — PROGRESS NOTES
"  [facility] Mental Health and Addiction Assessment Center  Provider Name:    Yadi Bond [facility]  Mental Health and Addiction Evaluation Center  Phone: [phone]    PATIENT'S NAME: Joel Pineda  PREFERRED NAME: Tito  PRONOUNS:  He/Him/His     MRN: [id number]  : 1975   ACCT. NUMBER:  [id number]  DATE OF SERVICE: 20  START TIME: 11:00am  END TIME: 12:00pm  PREFERRED PHONE: [phone]  May we leave a program related message: Yes  SERVICE MODALITY:  Video Visit:      Provider verified identity through the following two step process.  Patient provided:  Patient  and Patient address    Telemedicine Visit: The patient's condition can be safely assessed and treated via synchronous audio and visual telemedicine encounter.      Reason for Telemedicine Visit: Services only offered telehealth    Originating Site (Patient Location): Patient's home    Distant Site (Provider Location): [facility] MENTAL HEALTH & ADDICTION SERVICES    Consent:  The patient/guardian has verbally consented to: the potential risks and benefits of telemedicine (video visit) versus in person care; bill my insurance or make self-payment for services provided; and responsibility for payment of non-covered services.     Patient would like the video invitation sent by: Send to e-mail at: [email or web address]    Mode of Communication:  Video Conference via Amwell    As the provider I attest to compliance with applicable laws and regulations related to telemedicine.    UNIVERSAL ADULT Mental Health DIAGNOSTIC ASSESSMENT      Identifying Information:  Patient is a 45 year old, .  The pronoun use throughout this assessment reflects the patient's chosen pronoun.  Patient was referred for an assessment by self.  Patient attended the session alone.     Chief Complaint:   The reason for seeking services at this time is: \" Before the pandemic I was relatively isolated, but it's gotten worse, it very hard " "to get motivated to do much so that's not healthy, I am also overwhelmed with a number of physical issues that are going on. I have a 15-year cat and her health is failing and its hard to see and hard to deal with that. It's probably better if I am getting support for the group than deal with it on my own. \"   The problem(s) began - got worse since the pandemic. Patient has attempted to resolve these concerns in the past through therapy, DBT, medication management.      Social/Family History:  Patient reported they grew up in [place].  They were raised by biological mother and step father. Parents  when hew as [age] years old. Patient visited dad every other weekend. Has an older brother and younger sister.   Patient reported that their childhood was \"That's where some of the neglect happened and malformed later development, I [place] farm, had some friends, struggled a lot with making friends.\"  Patient described their current relationships with family of origin as \"with my mom it's good, step-dad \"so-so\", dad lives in [place] I see him a couple times a year, see my siblings a couiple times a year.\"      The patient describes their cultural background as \"relatively homogenous\".  Cultural influences and impact on patient's life structure, values, norms, and healthcare: Spiritual Beliefs: [employer].  Contextual influences on patient's health include: Individual Factors -isolated due to pandemic, pet is ill, physical health issues.    These factors will be addressed in the Preliminary Treatment plan.  Patient identified their preferred language to be English. Patient reported they does not need the assistance of an  or other support involved in therapy.     Patient reported had no significant delays in developmental tasks.   Patient's highest education level was college graduate. Patient identified the following learning problems: none reported.  Modifications will not be used to " assist communication in therapy.   Patient reports they are  able to understand written materials.    Patient reported the following relationship history.  Patient's current relationship status is single .   Patient identified their sexual orientation as heterosexual.  Patient reported having zero child(reagan). Patient identified parents, siblings, best friend and varying degrees of connection as part of their support system.  Patient identified the quality of these relationships as good.      Patient's current living/housing situation involves staying in own home/apartment.  They live alone and they report that housing is stable.     Patient is currently disabled.  Patient reports their finances are obtained through SSDI disability.  Patient does not identify finances as a current stressor.      Patient reported that they have not been involved with the legal system.   Patient denies being on probation / parole / under the jurisdiction of the court.    Patient's Strengths and Limitations:  Patient identified the following strengths or resources that will help them succeed in treatment: commitment to health and well being, friends / good social support, family support, insight and motivation. Things that may interfere with the patient's success in treatment include: none identified.     Personal and Family Medical History:   Patient does report a family history of mental health concerns.  Patient reports family history includes Anxiety Disorder in his mother and sister; Breast Cancer in his mother; Colon Polyps in his mother; Depression in his brother, father, mother, paternal grandfather, and sister; Diabetes in his mother; Heart Disease in his maternal grandfather and maternal grandmother; Hyperlipidemia in his father; Hypertension in his father and mother; Musculoskeletal Disorder in his brother, father, and mother; Obesity in his mother; Osteoporosis in his mother; Other Cancer in an other family member; Psychotic  Disorder in his paternal grandfather; Substance Abuse in his brother, brother, father, and sister; Suicide in his paternal grandfather; Thyroid Disease in his mother; Ulcerative Colitis in his mother..     Patient does report Mental Health Diagnosis and/or Treatment.  Patient Patient reported the following previous diagnoses which include(s): an Anxiety Disorder, a Bipolar Disorder, Depression and a Personality Disorder .  Patient reported symptoms began in 1997.   Patient has received mental health services in the past: DBT, PHP, ADT, Individual therapy, psychiatry.  [facility]  Patient denies a history of civil commitment.  Currently, patient is receiving other mental health services.  These include psychotherapy with Sapna Cavazos [facility] and psychiatry with Dr. Alegria at [facility].  Next appointment: NA.      Patient has had a physical exam to rule out medical causes for current symptoms.  Date of last physical exam was within the past year. Client was encouraged to follow up with PCP if symptoms were to develop. The patient has a [facility] Primary Care Provider, who is named Ksenia Lyles..  Patient reports the following current medical concerns: pain.  There are not significant appetite / nutritional concerns / weight changes.   Patient does not report a history of head injury / trauma / cognitive impairment.  Hit head of beside table and needed stitches in January.      Patient reports current meds as:   No outpatient medications have been marked as taking for the 11/23/20 encounter (Hospital Encounter) with Ezequiel Bond LICSW.       Medication Adherence:  Patient reports taking prescribed medications as prescribed.    Patient Allergies:    Allergies   Allergen Reactions     Amoxicillin-Pot Clavulanate Difficulty breathing     Banana Shortness Of Breath     Pt reports organic Banana is okay.      Nitroglycerin  Palpitations     Penicillins Anaphylaxis     Provigil [Modafinil] Shortness Of Breath     headache     Gadolinium Hives and Itching     Patient was premedicated for the contrast allergy. He did still have a reaction a few hours after injection. Hives and itching. Dr. Gomez told tech to inform pt he should only have contrast again in the future when premedicated and at a hospital. Not at an outpatient facility.      Ketoconazole      Topical cream caused swelling and itching     Dye [Contrast Dye] Other (See Comments) and Hives     Moderate flushing, CT contrast     Golimumab      Hives, bradycardia, face swelling     Neurontin [Gabapentin] Hives     Moderate hives     Nortriptyline Hives     Varicella Virus Vaccine Live      Rash     Flagyl [Metronidazole Hcl] Palpitations and Hives     Latex Rash     Metronidazole Palpitations, Other (See Comments) and Rash     dizziness (versus ciprofloxacin taken at same time)     No Clinical Screening - See Comments Rash     Nitrile gloves       Medical History:    Past Medical History:   Diagnosis Date     Acne      Acquired hypothyroidism      Allergic state      Ankylosing spondylitis lumbar region (H)      Ankylosing spondylitis of sacral region (H)      Anxiety      Bipolar 2 disorder (H)      Chest pain     Chest pain, regulated w/BP meds. Clear arteries.     Chronic pain      DDD (degenerative disc disease), lumbar      Depressive disorder      Diabetes (H)      Diverticulosis      Facet arthritis of cervical region      Gastroesophageal reflux disease      Hypertension      IBS (irritable bowel syndrome)      Intracranial arachnoid cyst      Polyneuropathy      Pulmonary embolism (H)      Skin exam, screening for cancer 12/3/2013     Sleep apnea      Uncomplicated asthma          Current Mental Status Exam:   Appearance:  Appropriate    Eye Contact:  Good   Psychomotor:  Normal       Gait / station:  no problem  Attitude / Demeanor: Cooperative  Friendly  Pleasant  Speech      Rate / Production: Normal/ Responsive      Volume:  Normal  volume      Language:  intact, no problems and good  Mood:   Normal  Affect:   Appropriate    Thought Content: Clear   Thought Process: Coherent  Goal Directed  Logical       Associations: No loosening of associations  Insight:   Good   Judgment:  Intact   Orientation:  All  Attention/concentration: Good    Rating Scales:    PHQ9:    PHQ-9 SCORE 2/17/2020 5/1/2020 7/7/2020   PHQ-9 Total Score - - -   PHQ-9 Total Score MyChart - - -   PHQ-9 Total Score 17 13 15   PHQ-9 Total Score - - -   ;    GAD7:    YUDI-7 SCORE 7/1/2019 2/17/2020 5/1/2020   Total Score - - -   Total Score - - -   Total Score 15 13 10   Total Score BEH Adult - - -     CGI:     First:No data recorded;    Most recentNo data recorded    Substance Use:  Patient did report a family history of substance use concerns; see medical history section for details.  Patient has not received chemical dependency treatment in the past.  Patient has not ever been to detox.      Patient is not currently receiving any chemical dependency treatment. Patient reported the following problems as a result of their substance use: none.    Patient denies using alcohol.  Patient denies using tobacco.  Patient denies using marijuana.   Patient reports using caffeine. 6 cups caffeinated beverages a day  Patient reports using/abusing the following substance(s). Patient reported no other substance use.     CAGE- AID:    CAGE-AID Total Score 11/23/2020   Total Score 0       Substance Use: No symptoms    Based on the negative CAGE score and clinical interview there  are not indications of drug or alcohol abuse.      Significant Losses / Trauma / Abuse / Neglect Issues:   Patient did not serve in the .  There are indications or report of significant loss, trauma, abuse or neglect issues related to: Neglect, emotional abuse  Concerns for possible neglect are not present.     Safety Assessment:    Current Safety Concerns: Patient denies history of suicide attempts. Patient endorses passive suicidal ideation. He denies a plan. He denies intent. He reports they are fleeting thoughts and he is able to control them by distracting himself. Patient reports he feels safe at home. He has crisis number he has used in the past such as [facility]E.  A safety plan was developed today.   [place] Suicide Severity Rating Scale (Lifetime/Recent)  [place] Suicide Severity Rating (Lifetime/Recent) 11/23/2020   1. Wish to be Dead (Lifetime) Yes   1. Wish to be Dead (Recent) Yes   2. Non-Specific Active Suicidal Thoughts (Lifetime) No   2. Non-Specific Active Suicidal Thoughts (Recent) No   3. Active Suicidal Ideation with any Methods (Not Plan) Without Intent to Act (Lifetime) No   3. Active Sucidal Ideation with any Methods (Not Plan) Without Intent to Act (Recent) No   4. Active Suicidal Ideation with Some Intent to Act, Without Specific Plan (Lifetime) No   4. Active Suicidal Ideation with Some Intent to Act, Without Specific Plan (Recent) No   5. Active Suicidal Ideation with Specific Plan and Intent (Lifetime) No   5. Active Suicidal Ideation with Specific Plan and Intent (Recent) No   Most Severe Ideation Rating (Past Month) 1   Frequency (Past Month) 3   Duration (Past Month) 1   Controllability (Past Month) 1   Protective Factors (Past Month) 1   Reasons for Ideation (Past Month) 5   Actual Attempt (Lifetime) No   Actual Attempt (Past 3 Months) No   Has subject engaged in non-suicidal self-injurious behavior? (Lifetime) No   Has subject engaged in non-suicidal self-injurious behavior? (Past 3 Months) No   Interrupted Attempts (Lifetime) No   Interrupted Attempts (Past 3 Months) No   Aborted or Self-Interrupted Attempt (Lifetime) No   Aborted or Self-Interrupted Attempt (Past 3 Months) No   Preparatory Acts or Behavior (Lifetime) No   Preparatory Acts or Behavior (Past 3 Months) No     Patient denies current homicidal  ideation and behaviors.  Patient denies current self-injurious ideation and behaviors.    Patient denied risk behaviors associated with substance use.  Patient denies any high risk behaviors associated with mental health symptoms.  Patient reports the following current concerns for their personal safety: None.  Patient reports there are not  firearms in the house. .     History of Safety Concerns:  Patient denied a history of homicidal ideation.     Patient denied a history of personal safety concerns.    Patient denied a history of assaultive behaviors.    Patient denied a history of sexual assault behaviors.     Patient denied a history of risk behaviors associated with substance use.  Patient denies any history of high risk behaviors associated with mental health symptoms.  Patient reports the following protective factors: positive relationships positive social network and positive family connections, forward/future oriented thinking, dedication to family/friends, adherence with prescribed medication, committment to well-being and pets    Risk Plan:  See Recommendations for Safety and Risk Management Plan    Review of Symptoms per patient report:  Depression: Change in sleep, Lack of interest, Excessive or inappropriate guilt, Change in energy level, Difficulties concentrating, Suicidal ideation, Feelings of hopelessness, Feelings of helplessness, Low self-worth, Ruminations, Irritability, Feeling sad, down, or depressed and Withdrawn  Regina:  No Symptoms  Psychosis: No Symptoms   Anxiety: Excessive worry, Nervousness, Physical complaints, such as headaches, stomachaches, muscle tension, Social anxiety, Sleep disturbance, Ruminations, Poor concentration and Irritability  Panic:  No symptoms  Post Traumatic Stress Disorder:  Reexperiencing of trauma, Impaired functioning and Nightmares   Eating Disorder: No Symptoms  ADD / ADHD:  No symptoms  Conduct Disorder: No symptoms  Autism Spectrum Disorder: No  symptoms  Obsessive Compulsive Disorder: No Symptoms    Patient reports the following compulsive behaviors and treatment history: none.      Diagnostic Criteria:   A) Recurrent episode(s) - symptoms have been present during the same 2-week period and represent a change from previous functioning 5 or more symptoms (required for diagnosis)   - Depressed mood. Note: In children and adolescents, can be irritable mood.     - Diminished interest or pleasure in all, or almost all, activities.    - Fatigue or loss of energy.    - Feelings of worthlessness or inappropriate guilt.    - Diminished ability to think or concentrate, or indecisiveness.    - Recurrent thoughts of death (not just fear of dying), recurrent suicidal ideation without a specific plan, or a suicide attempt or a specific plan for committing suicide.   B) The symptoms cause clinically significant distress or impairment in social, occupational, or other important areas of functioning  C) The episode is not attributable to the physiological effects of a substance or to another medical condition  D) The occurence of major depressive episode is not better explained by other thought / psychotic disorders  E) There has never been a manic episode or hypomanic episode    Functional Status:  Patient reports the following functional impairments: health maintenance, management of the household and or completion of tasks, relationship(s), self-care and social interactions.     WHODAS:   WHODAS 2.0 Total Score 11/23/2020   Total Score 38       Clinical Summary:  1. Reason for assessment: To determine appropriate level of care due to worsening symptoms of depression  .  2. Psychosocial, Cultural and Contextual Factors: isolated due to pandemic  .  3. Principal DSM5 Diagnoses  (Sustained by DSM5 Criteria Listed Above):   296.33 (F33.2) Major Depressive Disorder, Recurrent Episode, Severe _.  4. Other Diagnoses that is relevant to services:   300.00 (F41.9) Unspecified  Anxiety Disorder  301.83 (F60.3) Borderline Personality Disorder.  5. Provisional Diagnosis:    6. Prognosis: Return to Normal Functioning, Expect Improvement and Relieve Acute Symptoms.  7. Likely consequences of symptoms if not treated: If untreated, patient's mental health will likely deteriorate and may require a higher level of care. Patient has protective factors that mitigate risk of harm to self.  .  8. Client strengths include:  educated, goal-focused, good listener, has a previous history of therapy and insightful .     Recommendations:     1. Plan for Safety and Risk Management:   A safety and risk management plan has been developed including: Patient consented to co-developed safety plan.  Safety and risk management plan was completed.  Patient agreed to use safety plan should any safety concerns arise.  A copy was given to the patient..          Report to child / adult protection services was NA.     2. Patient did not identify Hoahaoism, ethnic or cultural issues relevant to therapy at this time      3. Initial Treatment will focus on:    Depressed Mood -   Anxiety -   Functional Impairment at: home  Risk Management / Safety Concerns related to: Suicidal ideation.     4. Resources/Service Plan:    services are not indicated.   Modifications to assist communication are not indicated.   Additional disability accommodations are not indicated.      5. Collaboration:   Collaboration / coordination of treatment will be initiated with the following  support professionals: outpatient therapist.      6.  Referrals:   The following referral(s) will be initiated: Day Treatment. Next Scheduled Appointment: TBD.  Program staff will contact patient directly     A Release of Information has been obtained for the following: emergency contact.    7. TRACEY:    TRACEY:  Discussed the general effects of drugs and alcohol on health and well-being.     8. Records:   These were reviewed at time of  "assessment.   Information in this assessment was obtained from the medical record and  provided by patient who is a good historian.    Patient will have open access to their mental health medical record.      Provider Name/ Credentials:  SCOTT Lares   2020                                   LOCUS Worksheet     Name: Joel Pineda MRN: [id number]    : 1975      Gender:  male    PMI:     Provider Name: Gianna   Provider NPI:  [id number]     Actual level of Care Provided:  Therapy    Service(s) receiving or referred to:  Day treatment    Reason for Variance: Due to worsening symptoms of depression      Rating completed by: SCOTT Lares       I. Risk of Harm:   2      Low Risk of Harm    II. Functional Status:   4      Serious Impairment    III. Co-Morbidity:   3      Significant Co-Morbidity    IV - A. Recovery Environment - Level of Stress:   3      Moderately Stress Environment    IV - B. Recovery Environment - Level of Support:   2      Supportive Environment    V. Treatment and Recovery History:   3      Moderate to Equivocal Response to Treatment and Recovery Management    VI. Engagement and Recovery Project:   2      Positive Engagement and Recovery       19 Composite Score    Level of Care Recommendation:   17 to 19       High Intensity Community Based Services                Outpatient Mental Health Services - Adult    MY COPING PLAN FOR SAFETY    PATIENT'S NAME: Joel Pineda  MRN:   [id number]    SAFETY PLAN:    Step 1: Warning signs / cues (Thoughts, images, mood, situation, behavior) that a crisis may be developing:      Thoughts: \"I can't do this anymore\" and \"I just want this to end\"    Images: flashbacks    Thinking Processes: ruminations (can't stop thinking about my problems)    Mood: worsening depression and intense worry    Behaviors: isolating/withdrawing , not taking care of myself and not taking care of my responsibilities    Situations: feeling " "isolated, medical problems       Step 2: Coping strategies - Things I can do to take my mind off of my problems without contacting another person (relaxation technique, physical activity):      Distress Tolerance Strategies:  Read, Watch TV    Physical Activities: Play with my cat    Focus on helpful thoughts:  \"This is temporary\" and \"I will get through this\"    Step 3: People and social settings that provide distraction:     Name:  Keyana (sister) Phone:        Step 4: Remind myself of people and things that are important to me and worth living for:  \"my connection with my mom, my connection with my cat, having the connection with my friend from high school, not wanting to cause other people pain\"     Step 5: When I am in crisis, I can ask these people to help me use my safety plan:     \"I would call [facility]\"     Step 6: Making the environment safe:       None    Step 7: Professionals or agencies I can contact during a crisis:      Suicide Prevention Lifeline: 2-325-332-TALK (7323)    Crisis Text Line Service (available 24 hours a day, 7 days a week): Text [place] to 364818    Call  **CRISIS (407424) from a cell phone to talk to a team of professionals who can help you.    Crisis Services By [facility]: Phone Number:   [place]     [phone]   [place]    [phone]   [place]    [phone]   [place]    [phone]   [place]    [phone]   [place] [phone]   [place]     [phone]       Call 911 or go to my nearest emergency department.     I helped develop this safety plan and agree to use it when needed.  I have been given a copy of this plan.        Today s date:  11/23/2020  Adapted from Safety Plan Template 2008 Shanita Lazcano and Ariel Lorenzo is reprinted with the express permission of the authors.  No portion of the Safety Plan Template may be reproduced without the express, written permission.  You can contact the authors at [email or web address] or [email or web address]           "

## 2020-11-24 ENCOUNTER — TELEPHONE (OUTPATIENT)
Dept: BEHAVIORAL HEALTH | Facility: CLINIC | Age: 45
End: 2020-11-24

## 2020-11-24 ASSESSMENT — ANXIETY QUESTIONNAIRES: GAD7 TOTAL SCORE: 13

## 2020-11-24 NOTE — TELEPHONE ENCOUNTER
Writer reached out to Pt today to touch base about a tentative start date for next week in the ADT program.  Explained would have to go into an IOP track and so would have to be in the mornings.  He stated his schedule allows for a morning track, but he knows he is typically more nauseous in the mornings.  He is willing to try it.    Tentative start date given on 12/2.   He will want to test out a new camera that he is getting in the mail on Friday too before group starts next week.

## 2020-11-25 RX ORDER — GLIPIZIDE 5 MG/1
5 TABLET, FILM COATED, EXTENDED RELEASE ORAL DAILY
Qty: 30 TABLET | Refills: 0 | Status: SHIPPED | OUTPATIENT
Start: 2020-11-25 | End: 2020-12-18

## 2020-11-25 RX ORDER — ONDANSETRON 8 MG/1
TABLET, FILM COATED ORAL
Qty: 20 TABLET | Refills: 0 | OUTPATIENT
Start: 2020-11-25

## 2020-11-25 RX ORDER — NABUMETONE 500 MG/1
500-1000 TABLET, FILM COATED ORAL 2 TIMES DAILY WITH MEALS
Qty: 60 TABLET | Refills: 0 | Status: SHIPPED | OUTPATIENT
Start: 2020-11-25 | End: 2020-12-18

## 2020-11-25 NOTE — TELEPHONE ENCOUNTER
Routing refill request to provider for review/approval because:  Labs not current:  AST and creatinine  BP fails protocol.    Sapna Barragan RN, [facility]

## 2020-11-25 NOTE — TELEPHONE ENCOUNTER
Routing refill request to provider for review/approval because:  Drug not active on patient's medication list  Heather White RN  [facility]

## 2020-11-25 NOTE — TELEPHONE ENCOUNTER
See mychart message to patient   Creat decreased last checked.  One month of glipizide- blood pressure not at goal and encouraged to avoid nsaids

## 2020-11-27 RX ORDER — POLYETHYLENE GLYCOL 3350 17 G/17G
1 POWDER, FOR SOLUTION ORAL DAILY
COMMUNITY
End: 2021-08-17

## 2020-11-27 NOTE — PATIENT INSTRUCTIONS
Your BMI is There is no height or weight on file to calculate BMI.  Weight management is a personal decision.  If you are interested in exploring weight loss strategies, the following discussion covers the approaches that may be successful. Body mass index (BMI) is one way to tell whether you are at a healthy weight, overweight, or obese. It measures your weight in relation to your height.  A BMI of 18.5 to 24.9 is in the healthy range. A person with a BMI of 25 to 29.9 is considered overweight, and someone with a BMI of 30 or greater is considered obese. More than two-thirds of American adults are considered overweight or obese.  Being overweight or obese increases the risk for further weight gain. Excess weight may lead to heart disease and diabetes.  Creating and following plans for healthy eating and physical activity may help you improve your health.  Weight control is part of healthy lifestyle and includes exercise, emotional health, and healthy eating habits. Careful eating habits lifelong are the mainstay of weight control. Though there are significant health benefits from weight loss, long-term weight loss with diet alone may be very difficult to achieve- studies show long-term success with dietary management in less than 10% of people. Attaining a healthy weight may be especially difficult to achieve in those with severe obesity. In some cases, medications, devices and surgical management might be considered.  What can you do?  If you are overweight or obese and are interested in methods for weight loss, you should discuss this with your provider.     Consider reducing daily calorie intake by 500 calories.     Keep a food journal.     Avoiding skipping meals, consider cutting portions instead.    Diet combined with exercise helps maintain muscle while optimizing fat loss. Strength training is particularly important for building and maintaining muscle mass. Exercise helps reduce stress, increase energy,  and improves fitness. Increasing exercise without diet control, however, may not burn enough calories to loose weight.       Start walking three days a week 10-20 minutes at a time    Work towards walking thirty minutes five days a week     Eventually, increase the speed of your walking for 1-2 minutes at time    In addition, we recommend that you review healthy lifestyles and methods for weight loss available through the National Institutes of Health patient information sites:  http://win.niddk.nih.gov/publications/index.htm    And look into health and wellness programs that may be available through your health insurance provider, employer, local community center, or scar club.    Weight management plan: Patient was referred to their PCP to discuss a diet and exercise plan.

## 2020-11-27 NOTE — PROGRESS NOTES
"Joel Pineda is a 45 year old male who is being evaluated via a billable video visit.      The patient has been notified of following:     \"This video visit will be conducted via a call between you and your physician/provider. We have found that certain health care needs can be provided without the need for an in-person physical exam.  This service lets us provide the care you need with a video conversation.  If a prescription is necessary we can send it directly to your pharmacy.  If lab work is needed we can place an order for that and you can then stop by our lab to have the test done at a later time.    Video visits are billed at different rates depending on your insurance coverage.  Please reach out to your insurance provider with any questions.    If during the course of the call the physician/provider feels a video visit is not appropriate, you will not be charged for this service.\"    Patient has given verbal consent for Video visit? Yes  How would you like to obtain your AVS? MyChart  If you are dropped from the video visit, the video invite should be resent to: Send to e-mail at: [email or web address]  Will anyone else be joining your video visit? No    Follow up PSG    Discussed night time behaviors and current treatment with LLOYD.     Again reviewed the patients history which he clearly describes dream enactment and not necessarily PLMs.  Of note his PSG for REM motor tone was essentially normal.  This may have been due to the melatonin.  He did clearly have elevated PLMs, which is not surprising considering his chronic pain and post herpetic neuralgia.  Discussed our options at this point.  He will stay at 9mg of melatonin and will discuss with his psychiatrist about switching from alprazolam to clonazepam.  I would be happy to prescribe for nighttime but would need some assistance with daytime dosing, as he takes his benzo for anxiety.      In addition he is already on lyrica but will change the " timing of his doses so he takes 300mg at night and less during the day which may both address his PLMs but also be less sedating during the day. OF note he is on opioids but we did not notice central apneas during his study.  CPAP nicely addressed his LLOYD.  He would like a new machine as his is nearly 5 years old.  I will go ahead and put an order in for him to get a new AutoCPAP in February and then he can follow up with me 6 weeks after that.      All questions have been answered    He has been advised not to drive if tired or sleepy.        Video-Visit Details    Type of service:  Video Visit    Video Start Time: 1246  Video End Time: 1311    Originating Location (pt. Location): Home    Distant Location (provider location):  [facility] SLE[facility]     Platform used for Video Visit: Sophia Damico MD

## 2020-11-30 ENCOUNTER — BEH TREATMENT PLAN (OUTPATIENT)
Dept: BEHAVIORAL HEALTH | Facility: CLINIC | Age: 45
End: 2020-11-30
Attending: PSYCHIATRY & NEUROLOGY

## 2020-11-30 ENCOUNTER — VIRTUAL VISIT (OUTPATIENT)
Dept: SLEEP MEDICINE | Facility: CLINIC | Age: 45
End: 2020-11-30
Payer: MEDICARE

## 2020-11-30 ENCOUNTER — TELEPHONE (OUTPATIENT)
Dept: BEHAVIORAL HEALTH | Facility: CLINIC | Age: 45
End: 2020-11-30

## 2020-11-30 ENCOUNTER — MYC REFILL (OUTPATIENT)
Dept: FAMILY MEDICINE | Facility: CLINIC | Age: 45
End: 2020-11-30

## 2020-11-30 DIAGNOSIS — G89.4 CHRONIC PAIN SYNDROME: ICD-10-CM

## 2020-11-30 DIAGNOSIS — M45.8 ANKYLOSING SPONDYLITIS OF SACRAL REGION (H): ICD-10-CM

## 2020-11-30 DIAGNOSIS — F33.2 MAJOR DEPRESSIVE DISORDER, RECURRENT EPISODE, SEVERE (H): ICD-10-CM

## 2020-11-30 DIAGNOSIS — G47.33 OSA (OBSTRUCTIVE SLEEP APNEA): Primary | ICD-10-CM

## 2020-11-30 DIAGNOSIS — M51.369 DDD (DEGENERATIVE DISC DISEASE), LUMBAR: ICD-10-CM

## 2020-11-30 PROCEDURE — 99214 OFFICE O/P EST MOD 30 MIN: CPT | Mod: 95 | Performed by: PSYCHIATRY & NEUROLOGY

## 2020-11-30 NOTE — TELEPHONE ENCOUNTER
Writer called Pt today to confirm a start date of Wednesday, 12/2 in the ADT 6A track.  He now has a camera and would like to test it tomorrow when someone calls him for admission.  He did ask the CPT code and will check with insurance since he wants to make sure he can continue with his individual therapist on Thursdays.  He also said he talked to the sleep doctor today so is hopeful he will be able to make a morning track work successfully.  Will plan to contact tomorrow for admission on Wednesday at 9am.

## 2020-12-01 NOTE — PROGRESS NOTES
"Outpatient Mental Health Services - Adult     MY COPING PLAN FOR SAFETY     PATIENT'S NAME:    Joel Pineda  MRN:                           [id number]     SAFETY PLAN:     Step 1: Warning signs / cues (Thoughts, images, mood, situation, behavior) that a crisis may be developing:     ? Thoughts: \"I can't do this anymore\" and \"I just want this to end\"  ? Images: flashbacks  ? Thinking Processes: ruminations (can't stop thinking about my problems)  ? Mood: worsening depression and intense worry  ? Behaviors: isolating/withdrawing , not taking care of myself and not taking care of my responsibilities  ? Situations: feeling isolated, medical problems   ?    Step 2: Coping strategies - Things I can do to take my mind off of my problems without contacting another person (relaxation technique, physical activity):     ? Distress Tolerance Strategies:  Read, Watch TV  ? Physical Activities: Play with my cat  ? Focus on helpful thoughts:  \"This is temporary\" and \"I will get through this\"     Step 3: People and social settings that provide distraction:                 Name:  Keyana (sister)  Phone:                    Step 4: Remind myself of people and things that are important to me and worth living for:  \"my connection with my mom, my connection with my cat, having the connection with my friend from high school, not wanting to cause other people pain\"      Step 5: When I am in crisis, I can ask these people to help me use my safety plan:                \"I would call [facility]E\"                Step 6: Making the environment safe:      ? None     Step 7: Professionals or agencies I can contact during a crisis:     ? Suicide Prevention Lifeline: 1-568-719-TALK (5865)  ? Crisis Text Line Service (available 24 hours a day, 7 days a week): Text [place] to 083614  ? Call  **CRISIS (378439) from a cell phone to talk to a team of professionals who can help you.          Crisis Services By [facility]: Phone Number:   [place]     [phone]   [place]  "   [phone]   [place]    [phone]   [place]    [phone]   [place]    [phone]   [place] [phone]   [place]     [phone]      ? Call 911 or go to my nearest emergency department.             I helped develop this safety plan and agree to use it when needed.  I have been given a copy of this plan.          Today s date:  11/23/2020  Adapted from Safety Plan Template 2008 Shanita Lazcano and Ariel Lorenzo is reprinted with the express permission of the authors.  No portion of the Safety Plan Template may be reproduced without the express, written permission.  You can contact the authors at [email or web address] or [email or web address]

## 2020-12-01 NOTE — PROGRESS NOTES
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 Yes  Note: Please copy safety plan copied into BEH Encounter     Other (insurance/childcare/transportation/housing/planned absences/etc): None reported     Patient's insurance is: Medicare .     Does patient need appointment with provider? Yes, IOP    Review patient's program schedule and inform them of any variation due to late days or holidays.                                                                           Completed by: JUDSON Hanson on 12/1/2020 at 9:28 AM

## 2020-12-02 ENCOUNTER — HOSPITAL ENCOUNTER (OUTPATIENT)
Dept: BEHAVIORAL HEALTH | Facility: CLINIC | Age: 45
Discharge: HOME | End: 2020-12-02
Attending: PSYCHIATRY & NEUROLOGY | Admitting: PSYCHIATRY & NEUROLOGY
Payer: MEDICARE

## 2020-12-02 ENCOUNTER — HOSPITAL ENCOUNTER (OUTPATIENT)
Dept: BEHAVIORAL HEALTH | Facility: CLINIC | Age: 45
End: 2020-12-02
Attending: PSYCHIATRY & NEUROLOGY
Payer: MEDICARE

## 2020-12-02 PROBLEM — F33.2 MAJOR DEPRESSIVE DISORDER, RECURRENT EPISODE, SEVERE (H): Status: ACTIVE | Noted: 2020-12-02

## 2020-12-02 PROCEDURE — 90853 GROUP PSYCHOTHERAPY: CPT | Mod: GT | Performed by: COUNSELOR

## 2020-12-02 PROCEDURE — 90853 GROUP PSYCHOTHERAPY: CPT | Mod: PO | Performed by: COUNSELOR

## 2020-12-02 PROCEDURE — 90792 PSYCH DIAG EVAL W/MED SRVCS: CPT | Mod: 95 | Performed by: PSYCHIATRY & NEUROLOGY

## 2020-12-02 RX ORDER — OXYCODONE HYDROCHLORIDE 5 MG/1
5-10 TABLET ORAL EVERY 6 HOURS PRN
Qty: 35 TABLET | Refills: 0 | Status: SHIPPED | OUTPATIENT
Start: 2020-12-02 | End: 2020-12-28

## 2020-12-02 NOTE — GROUP NOTE
Psychotherapy Group Note    PATIENT'S NAME: Joel Pineda  MRN:   [id number]  :   1975  ACCT. NUMBER: [id number]  DATE OF SERVICE: 20  START TIME:  9:00 AM  END TIME:  9:50 AM  FACILITATOR: Morenita Lau LPCC  TOPIC: MH EBP Group: Behavioral Activation  [facility] Adult Mental Health Day Treatment  TRACK: 6A    NUMBER OF PARTICIPANTS: 7    Summary of Group / Topics Discussed:  Behavioral Activation: Behavior Chain Analysis: Patients explored the steps leading up to identified unwanted behaviors, and ways to replace them with more effective behaviors. Patients examined factors contributing to unwanted behaviors, with a goal of determining ways to interrupt the unhealthy patterns.    Patient Session Goals / Objectives:    Identify thoughts, feelings, and actions that contribute to unwanted behaviors    Identify thoughts, feelings, and actions that contribute to more effective/healthy and desired behaviors    Make plans to track and implement changes and share experiences in group    Identify personal barriers to change    Telemedicine Visit: The patient's condition can be safely assessed and treated via synchronous audio and visual telemedicine encounter.      Reason for Telemedicine Visit: Services only offered telehealth and due to COVID-19    Originating Site (Patient Location): Patient's home    Distant Site (Provider Location): Provider Remote Setting    Consent:  The patient/guardian has verbally consented to: the potential risks and benefits of telemedicine (video visit) versus in person care; bill my insurance or make self-payment for services provided; and responsibility for payment of non-covered services.     Mode of Communication:  Video Conference via [employer]    As the provider I attest to compliance with applicable laws and regulations related to telemedicine.        Patient Participation / Response:  Fully participated with the group by sharing personal reflections /  insights and openly received / provided feedback with other participants.    Demonstrated understanding of topics discussed through group discussion and participation, Expressed understanding of the relationship between behaviors, thoughts, and feelings and Shared experiences and challenges with making behavioral changes    Treatment Plan:  Patient has an initial individualized treatment plan that was created as part of their diagnostic assessment / admission process.  A master individualized treatment plan is in the process of being developed with the patient and multi-disciplinary care team.    Morenita Lau, [facility]

## 2020-12-02 NOTE — PROGRESS NOTES
"Outpatient Psychiatric Evaluation- Standard  Adult    Name:  Joel Pineda  : 1975    Psychiatrist or PCP: Dr. Alegria  Current Psychotherapist: Beverly Cavazos    Identifying Data:  Patient is a 45 year old, single  White American male  who presents for initial visit with me.  Patient is currently on medical leave from spine surgery. Patient attended the phone/video session alone. Patient prefers to be called: \"Tito\"    Chief Complaint:  \"Starting morning IOP\"    HPI:  Joel Pineda is a 45 year old male with past history including depression, anxiety and borderline personality disorder who presents today with minimal symptoms. Group was good today. Just started. Has been working on getting up early. Feels hopeful. Has been isolating. That has been challenging.     Current psychiatric medications.   Cymbalta 60mg BID  Lamictal 200mg at bedtime.   Klonopin 0.5mg at bedtime   Seroquel 75mg Qhs  Melatonin PRN    Past diagnoses include: MDD, YUDI, BPD  Current medications include:   Current Outpatient Medications   Medication     acetaminophen 500 MG CAPS     albuterol (PROAIR HFA/PROVENTIL HFA/VENTOLIN HFA) 108 (90 Base) MCG/ACT inhaler     albuterol (PROVENTIL) (2.5 MG/3ML) 0.083% neb solution     ALPRAZolam (XANAX) 0.5 MG tablet     aspirin (ASA) 81 MG tablet     cetirizine (ZYRTEC) 10 MG tablet     cholecalciferol (VITAMIN D3) 58170 units (1250 mcg) capsule     cyanocobalamin (CYANOCOBALAMIN) 1000 MCG/ML injection     diltiazem 2% in PLO gel     DULoxetine (CYMBALTA) 60 MG capsule     EPINEPHrine (EPIPEN/ADRENACLICK/OR ANY BX GENERIC EQUIV) 0.3 MG/0.3ML injection 2-pack     etanercept (ENBREL SURECLICK) 50 MG/ML autoinjector     famotidine (PEPCID) 20 MG tablet     fluticasone (FLONASE) 50 MCG/ACT nasal spray     fluticasone-salmeterol (ADVAIR) 500-50 MCG/DOSE inhaler     glipiZIDE (GLUCOTROL XL) 5 MG 24 hr tablet     lamoTRIgine (LAMICTAL) 100 MG tablet     levothyroxine (SYNTHROID/LEVOTHROID) " "75 MCG tablet     lidocaine (XYLOCAINE) 5 % external ointment     melatonin 3 MG tablet     metoprolol succinate ER (TOPROL-XL) 200 MG 24 hr tablet     montelukast (SINGULAIR) 10 MG tablet     nabumetone (RELAFEN) 500 MG tablet     naloxone (NARCAN) 4 MG/0.1ML nasal spray     omega-3 acid ethyl esters (LOVAZA) 1 g capsule     omeprazole 20 MG tablet     order for DME     order for DME     order for DME     oxyCODONE (ROXICODONE) 5 MG tablet     polyethylene glycol (MIRALAX) 17 g packet     pregabalin (LYRICA) 150 MG capsule     QUEtiapine (SEROQUEL) 25 MG tablet     ramipril (ALTACE) 10 MG capsule     Rectal Protectant-Emollient (CALMOL-4) 76-10 % SUPP     rizatriptan (MAXALT-MLT) 5 MG ODT     rosuvastatin (CRESTOR) 40 MG tablet     syringe, disposable, (BD TUBERCULIN SYRINGE) 1 ML MISC     vitamin B complex with vitamin C (STRESS TAB) tablet     ZINC SULFATE-VITAMIN C MT     No current facility-administered medications for this encounter.       Medication side effects: Denies  Current stressors include: volunteering stress  Coping mechanisms and supports include: Therapy    Psychiatric Review of Symptoms:  Depression: No symptoms   PHQ-9 scores:   PHQ-9 SCORE 7/7/2020 11/23/2020 12/1/2020   PHQ-9 Total Score - - -   PHQ-9 Total Score MyChart - - 17 (Moderately severe depression)   PHQ-9 Total Score 15 18 17   PHQ-9 Total Score - - -     Regina:  No symptoms  Anxiety: Feeling nervous, anxious, or on edge  Worrying too much about different things    YUDI-7 scores:    YUDI-7 SCORE 5/1/2020 11/23/2020 12/1/2020   Total Score - - -   Total Score - - 13 (moderate anxiety)   Total Score 10 13 13   Total Score BEH Adult - - -     Panic:  \"Back in 1997\"   Agoraphobia:  tends to isolate   PTSD:  Nightmares   OCD:  No symptoms   Psychosis: No symptoms   ADD / ADHD: Forgetful  Feels it is from ECT  Gambling or shoplifting: No   Eating Disorder:  No symptoms  Sleep:   Trouble staying asleep     All other ROS negative.     Medical " Review of Systems:  10 systems (general, cardiovascular, respiratory, eyes, ENT, endocrine, GI, , M/S, neurological) were reviewed. Most pertinent finding(s) is/are: chronic pain. The remaining systems are all unremarkable.    A 12-item WHODAS 2.0 assessment was not completed. See Epic for any previously completed WHODAS assessments.    Psychiatric History:   Hospitalizations: January 2008 first time. Total 5-10. None for past three years.   History of Commitment? No   Past Treatment: counseling, day treatment, inpatient mental health services and psychiatry  Suicide Attempts: No   Current Suicide Risk: Suicide Assessment Completed Today.  Self-injurious Behavior: stabbing with needles in the past, very rare now  Electroconvulsive Therapy (ECT) or Transcranial Magnetic Stimulation (TMS): Yes both ECT and TMS   [facility] Genetic Testing: Yes With Dr. Angel     Past medication trials include but are not limited to:   Xanax    Substance Use History:  Current Use of Drugs/Alcohol: Denies   Past Use of Drugs/Alcohol: Denies  Patient reports no problems as a result of their drinking / drug use.   Patient has not received chemical dependency treatment in the past  Recovery Programming Involvement: Not Applicable    Tobacco use: No    Based on the clinical interview, there  are not indications of drug or alcohol abuse. Continue to monitor.   Discussed effect of substance use on overall health.     Past Medical History:  Past Medical History:   Diagnosis Date     Acne      Acquired hypothyroidism      Allergic state      Ankylosing spondylitis lumbar region (H)      Ankylosing spondylitis of sacral region (H)      Anxiety      Bipolar 2 disorder (H)      Chest pain     Chest pain, regulated w/BP meds. Clear arteries.     Chronic pain      DDD (degenerative disc disease), lumbar      Depressive disorder      Diabetes (H)      Diverticulosis      Facet arthritis of cervical region      Gastroesophageal reflux disease       Hypertension      IBS (irritable bowel syndrome)      Intracranial arachnoid cyst      Polyneuropathy      Pulmonary embolism (H)      Skin exam, screening for cancer 12/3/2013     Sleep apnea      Uncomplicated asthma       Surgery:   Past Surgical History:   Procedure Laterality Date     BACK SURGERY  10/07    lumbar discectomy L5-S1     COLONOSCOPY      Note: colonoscopy scheduled with [facility] on Friday, 9/4/15     COSMETIC SURGERY  2012    Nose Exterior - functional     GI SURGERY  August 2013    Sigmoidectomy     HERNIA REPAIR, UMBILICAL  8/23/11    [place], Dr. Evan Beavers     INCISION AND DRAINAGE, ABSCESS, COMPLEX  8/23/11    umbilical, Dr. Evan Beavers     LAPAROSCOPIC ASSISTED COLECTOMY LEFT (DESCENDING)  8/15/2013    Procedure: LAPAROSCOPIC ASSISTED COLECTOMY LEFT (DESCENDING);  Laparoscopic Hand Assisted Sigmoid Resection, Mobilization of Splenic Fissure, coloproctoscopy, *Latex Free Room* Anesthesia General with Pain block  ;  Surgeon: Aurora Justice MD;  Location: [facility] OR     NERVE SURGERY  8/18/11    RF ablation @ L3-S1 @ [facility]     RECONSTRUCT NOSE AND SEPTUM (FUNCTIONAL)  10/14/2011    Procedure:RECONSTRUCT NOSE AND SEPTUM (FUNCTIONAL); Functional Septorhinoplasty, Turbinate Reduction, ; Surgeon:CEDRIC CUEVAS; Location:[facility] OR     SINUS SURGERY  10/1/01    ethmoidectomy chronic sinusitis     Food and Medicine Allergies:     Allergies   Allergen Reactions     Amoxicillin-Pot Clavulanate Difficulty breathing     Banana Shortness Of Breath     Pt reports organic Banana is okay.      Nitroglycerin Palpitations     Penicillins Anaphylaxis     Provigil [Modafinil] Shortness Of Breath     headache     Gadolinium Hives and Itching     Patient was premedicated for the contrast allergy. He did still have a reaction a few hours after injection. Hives and itching. Dr. Gomez told tech to inform pt he should only have contrast again in the future when premedicated and at a hospital. Not at an outpatient  "facility.      Ketoconazole      Topical cream caused swelling and itching     Dye [Contrast Dye] Other (See Comments) and Hives     Moderate flushing, CT contrast     Golimumab      Hives, bradycardia, face swelling     Neurontin [Gabapentin] Hives     Moderate hives     Nortriptyline Hives     Varicella Virus Vaccine Live      Rash     Flagyl [Metronidazole Hcl] Palpitations and Hives     Latex Rash     Metronidazole Palpitations, Other (See Comments) and Rash     dizziness (versus ciprofloxacin taken at same time)     No Clinical Screening - See Comments Rash     Nitrile gloves     Seizures or Head Injury: Yes \"violent sleep episode with head injury\"  Diet: No Restrictions  Exercise: No regular exercise program  Supplements: Reviewed per Electronic Medical Record Today    Vital Signs:  None since this is a phone/video visit.     Labs:  Most recent laboratory results reviewed and the pertinent results include:   Orders Only on 11/11/2020   Component Date Value Ref Range Status     COVID-19 Virus PCR to [facility] - So* 11/11/2020 Nasopharyngeal   Final     COVID-19 Virus PCR to [facility] - Re* 11/11/2020 Not Detected   Final    Comment: Collection of multiple specimens from the same patient may be necessary to   detect the virus. The possibility of a false negative should be considered if   the patient's recent exposure or clinical presentation suggests 2019 nCOV   infection and diagnostic tests for other causes of illness are negative.   Repeat testing may be considered in this setting.  Patient sample was heat inactivated and amplified using the HDPCR SARS-CoV-2   assay ([employer] The HDPCRTM SARS-CoV-2 assay is a reverse   transcription real-time polymerase chain reaction (qRT-PCR) test intended for   the qualitative detection of nucleic acid  from SARS-CoV-2 in human nasopharyngeal swabs, oropharyngeal swabs, anterior   nasal swabs, mid-turbinate nasal swabs as well as nasal aspirate, nasal wash,   and " bronchoalveolar lavage (BAL) specimens from individuals who are suspected   of COVID-19 by their healthcare provider.  A negative result does not rule out the presence of real-time PCR inhibitors   in the sp                           ecimen or COVID-19 RNA in concentrations below the limit of detection   of the assay. The possibility of a false negative should be considered if the   patients recent exposure or clinical presentation suggests COVID-19.   Additional testing or repeat testing requires consultation with the   laboratory.  Nasopharyngeal specimen is the preferred choice for swab-based SARS CoV2   testing. When collection of a nasopharyngeal swab is not possible the   following are acceptable alternatives:  an oropharyngeal (OP) specimen collected by a healthcare professional, or a   nasal mid-turbinate (NMT) swab collected by a healthcare professional or by   onsite self-collection (using a flocked tapered swab), or an anterior nares   specimen collected by a healthcare professional or by onsite self-collection   (using a round foam swab). (Centers for Disease Control)  Testing performed by [facility] ([facility]) [address] [facility] [address]  The test performance characteristics were determined by [facility]. It has not been   cleared or approved by the FDA.  The laboratory is regulated under the Clinical Laboratory Improvement   Amendments of 1988 (CLIA-88) as qualified to perform high-complexity testing.   This test is used for clinical purposes. It should not be regarded as   investigational or for research.     Orders Only on 10/16/2020   Component Date Value Ref Range Status     Hemoglobin A1C 10/16/2020 6.0* 0 - 5.6 % Final    Comment: Normal <5.7% Prediabetes 5.7-6.4%  Diabetes 6.5% or higher - adopted from ADA   consensus guidelines.       Cholesterol 10/16/2020 155  <200 mg/dL Final      Triglycerides 10/16/2020 373* <150 mg/dL Final    Comment: Borderline high:  150-199 mg/dl  High:             200-499 mg/dl  Very high:       >499 mg/dl       HDL Cholesterol 10/16/2020 35* >39 mg/dL Final     LDL Cholesterol Calculated 10/16/2020 45  <100 mg/dL Final    Desirable:       <100 mg/dl     Non HDL Cholesterol 10/16/2020 120  <130 mg/dL Final     Sodium 10/16/2020 135  133 - 144 mmol/L Final     Potassium 10/16/2020 4.2  3.4 - 5.3 mmol/L Final     Chloride 10/16/2020 104  94 - 109 mmol/L Final     Carbon Dioxide 10/16/2020 26  20 - 32 mmol/L Final     Anion Gap 10/16/2020 5  3 - 14 mmol/L Final     Glucose 10/16/2020 114* 70 - 99 mg/dL Final     Urea Nitrogen 10/16/2020 10  7 - 30 mg/dL Final     Creatinine 10/16/2020 1.28* 0.66 - 1.25 mg/dL Final     GFR Estimate 10/16/2020 66  >60 mL/min/[1.73_m2] Final    Comment: Non  GFR Calc  Starting 12/18/2018, serum creatinine based estimated GFR (eGFR) will be   calculated using the Chronic Kidney Disease Epidemiology Collaboration   (CKD-EPI) equation.       GFR Estimate If Black 10/16/2020 77  >60 mL/min/[1.73_m2] Final    Comment:  GFR Calc  Starting 12/18/2018, serum creatinine based estimated GFR (eGFR) will be   calculated using the Chronic Kidney Disease Epidemiology Collaboration   (CKD-EPI) equation.       Calcium 10/16/2020 9.4  8.5 - 10.1 mg/dL Final   Orders Only on 10/14/2020   Component Date Value Ref Range Status     Color Urine 10/14/2020 Yellow   Final     Appearance Urine 10/14/2020 Clear   Final     Glucose Urine 10/14/2020 Negative  NEG^Negative mg/dL Final     Bilirubin Urine 10/14/2020 Negative  NEG^Negative Final     Ketones Urine 10/14/2020 Negative  NEG^Negative mg/dL Final     Specific Gravity Urine 10/14/2020 1.010  1.003 - 1.035 Final     Blood Urine 10/14/2020 Negative  NEG^Negative Final     pH Urine 10/14/2020 7.0  5.0 - 7.0 pH Final     Protein Albumin Urine 10/14/2020 Negative  NEG^Negative mg/dL  Final     Urobilinogen Urine 10/14/2020 0.2  0.2 - 1.0 EU/dL Final     Nitrite Urine 10/14/2020 Negative  NEG^Negative Final     Leukocyte Esterase Urine 10/14/2020 Negative  NEG^Negative Final     Source 10/14/2020 Midstream Urine   Final     Cannabinoids (96-lwv-2-carboxy-9-T* 10/14/2020 Not Detected  NDET^Not Detected ng/mL Final    Cutoff for a negative cannabinoid is 50 ng/mL or less.     Phencyclidine (Phencyclidine) 10/14/2020 Not Detected  NDET^Not Detected ng/mL Final    Cutoff for a negative PCP is 25 ng/mL or less.     Cocaine (Benzoylecgonine) 10/14/2020 Not Detected  NDET^Not Detected ng/mL Final    Cutoff for a negative cocaine is 150 ng/ml or less.     Methamphetamine (d-Methamphetamine) 10/14/2020 Not Detected  NDET^Not Detected ng/mL Final    Cutoff for a negative methamphetamine is 500 ng/ml or less.     Opiates (Morphine) 10/14/2020 Not Detected  NDET^Not Detected ng/mL Final    Cutoff for a negative opiate is 100 ng/ml or less.     Amphetamine (d-Amphetamine) 10/14/2020 Not Detected  NDET^Not Detected ng/mL Final    Cutoff for a negative amphetamine is 500 ng/mL or less.     Benzodiazepines (Nordiazepam) 10/14/2020 Detected, Abnormal Result* NDET^Not Detected ng/mL Final    Comment: Cutoff for a positive benzodiazepines is greater than 150 ng/ml.  This is an unconfirmed screening result to be used for medical purposes only.   Order [id number] for confirmation or individual confirmation tests to [facility].       Tricyclic Antidepressants (Desipra* 10/14/2020 Not Detected  NDET^Not Detected ng/mL Final    Cutoff for a negative tricyclic antidepressant is 300 ng/ml or less.     Methadone (Methadone) 10/14/2020 Not Detected  NDET^Not Detected ng/mL Final    Cutoff for a negative methadone is 200 ng/ml or less.     Barbiturates (Butalbital) 10/14/2020 Not Detected  NDET^Not Detected ng/mL Final    Cutoff for a negative barbituate is 200 ng/ml or less.     Oxycodone (Oxycodone) 10/14/2020 Not Detected   NDET^Not Detected ng/mL Final    Cutoff for a negative Oxycodone is 100 ng/mL or less.     Propoxyphene (Norpropoxyphene) 10/14/2020 Not Detected  NDET^Not Detected ng/mL Final    Cutoff for a negative propoxyphene is 300 ng/ml or less     Buprenorphine (Buprenorphine) 10/14/2020 Not Detected  NDET^Not Detected ng/mL Final    Cutoff for a negative buprenorphine is 10 ng/ml or less     Creatinine Urine 10/14/2020 15  mg/dL Final     Albumin Urine mg/L 10/14/2020 8  mg/L Final     Albumin Urine mg/g Cr 10/14/2020 51.61* 0 - 17 mg/g Cr Final   Orders Only on 08/18/2020   Component Date Value Ref Range Status     Hemoglobin A1C 08/18/2020 6.1* 0 - 5.6 % Final    Comment: Normal <5.7% Prediabetes 5.7-6.4%  Diabetes 6.5% or higher - adopted from ADA   consensus guidelines.     Office Visit on 08/18/2020   Component Date Value Ref Range Status     VZ Specimen 08/18/2020 Plasma   Final     Varicella Zoster DNA 08/18/2020 Not Detected   Final    Comment: (Note)  NOT DETECTED - A negative result does not rule out the  presence of PCR inhibitors in the patient specimen or assay  specific nucleic acid in concentrations below the level of  detection by the assay.  INTERPRETIVE INFORMATION: Varicella-Zoster Virus by PCR  Test developed and characteristics determined by [facility]. See Compliance Statement B: [email or web address]  Performed by [facility],  [address] [place] [id number] [phone]  [email or web address], French Jules MD, Lab. Director     Orders Only on 07/21/2020   Component Date Value Ref Range Status     COVID-19 Virus PCR to [facility] - So* 07/21/2020 Nasopharyngeal   Final     COVID-19 Virus PCR to [facility] - Re* 07/21/2020 Not Detected   Final    Comment: Collection of multiple specimens from the same patient may be necessary to   detect the virus. The possibility of a false negative should be considered if   the patient's recent exposure or clinical presentation suggests 2019 nCOV   infection and  diagnostic tests for other causes of illness are negative.   Repeat testing may be considered in this setting.  Viral RNA was extracted via a validated method and subsequently underwent   single step reverse transcriptase-real time polymerase chain reaction using   primers to the CDC specified N1,N2 gene targets of CoV2 and human RNP as an   internal control.  A negative result does not rule out the presence of real-time PCR inhibitors   in the specimen or COVID-19 RNA in concentrations below the limit of detection   of the assay. The possibility of a false negative should be considered if the   patients recent exposure or clinical presentation suggests COVID-19.   Additional testing or repeat testing requires consultation with the   laborator                           y.  Nasopharyngeal specimen is the preferred choice for swab-based SARS CoV2   testing. When collection of a nasopharyngeal swab is not possible the   following are acceptable alternatives:  an oropharyngeal (OP) specimen collected by a healthcare professional, or a   nasal mid-turbinate (NMT) swab collected by a healthcare professional or by   onsite self-collection (using a flocked tapered swab), or an anterior nares   specimen collected by a healthcare professional or by onsite self-collection   (using a round foam swab). (Centers for Disease Control)  Testing performed by [facility], Room [address], [address] [place] [address]. This test was developed and its   performance characteristics determined by the [facility]. It has not been cleared or approved by the FDA.  The laboratory is regulated under the Clinical Laboratory Improvement   Amendments of 1988 (CLIA-88) as qualified to perform high-complexity testin                           g.   This test is used for clinical purposes. It should not be regarded as   investigational or for research.     Orders Only on 07/03/2020    Component Date Value Ref Range Status     Sed Rate 07/03/2020 4  0 - 15 mm/h Final     Sodium 07/03/2020 139  133 - 144 mmol/L Final     Potassium 07/03/2020 3.9  3.4 - 5.3 mmol/L Final     Chloride 07/03/2020 104  94 - 109 mmol/L Final     Carbon Dioxide 07/03/2020 30  20 - 32 mmol/L Final     Anion Gap 07/03/2020 5  3 - 14 mmol/L Final     Glucose 07/03/2020 105* 70 - 99 mg/dL Final    Non Fasting     Urea Nitrogen 07/03/2020 17  7 - 30 mg/dL Final     Creatinine 07/03/2020 0.84  0.66 - 1.25 mg/dL Final     GFR Estimate 07/03/2020 >90  >60 mL/min/[1.73_m2] Final    Comment: Non  GFR Calc  Starting 12/18/2018, serum creatinine based estimated GFR (eGFR) will be   calculated using the Chronic Kidney Disease Epidemiology Collaboration   (CKD-EPI) equation.       GFR Estimate If Black 07/03/2020 >90  >60 mL/min/[1.73_m2] Final    Comment:  GFR Calc  Starting 12/18/2018, serum creatinine based estimated GFR (eGFR) will be   calculated using the Chronic Kidney Disease Epidemiology Collaboration   (CKD-EPI) equation.       Calcium 07/03/2020 9.4  8.5 - 10.1 mg/dL Final   Orders Only on 01/30/2020   Component Date Value Ref Range Status     Vitamin B12 01/30/2020 511  193 - 986 pg/mL Final   Office Visit on 01/24/2020   Component Date Value Ref Range Status     Hemoglobin A1C 01/24/2020 6.0* 0 - 5.6 % Final    Comment: Normal <5.7% Prediabetes 5.7-6.4%  Diabetes 6.5% or higher - adopted from ADA   consensus guidelines.       Sodium 01/24/2020 136  133 - 144 mmol/L Final     Potassium 01/24/2020 4.4  3.4 - 5.3 mmol/L Final     Chloride 01/24/2020 106  94 - 109 mmol/L Final     Carbon Dioxide 01/24/2020 24  20 - 32 mmol/L Final     Anion Gap 01/24/2020 6  3 - 14 mmol/L Final     Glucose 01/24/2020 99  70 - 99 mg/dL Final    Non Fasting     Urea Nitrogen 01/24/2020 14  7 - 30 mg/dL Final     Creatinine 01/24/2020 0.81  0.66 - 1.25 mg/dL Final     GFR Estimate 01/24/2020 >90  >60  mL/min/[1.73_m2] Final    Comment: Non  GFR Calc  Starting 12/18/2018, serum creatinine based estimated GFR (eGFR) will be   calculated using the Chronic Kidney Disease Epidemiology Collaboration   (CKD-EPI) equation.       GFR Estimate If Black 01/24/2020 >90  >60 mL/min/[1.73_m2] Final    Comment:  GFR Calc  Starting 12/18/2018, serum creatinine based estimated GFR (eGFR) will be   calculated using the Chronic Kidney Disease Epidemiology Collaboration   (CKD-EPI) equation.       Calcium 01/24/2020 9.2  8.5 - 10.1 mg/dL Final     Bilirubin Total 01/24/2020 0.4  0.2 - 1.3 mg/dL Final     Albumin 01/24/2020 4.4  3.4 - 5.0 g/dL Final     Protein Total 01/24/2020 7.7  6.8 - 8.8 g/dL Final     Alkaline Phosphatase 01/24/2020 104  40 - 150 U/L Final     ALT 01/24/2020 61  0 - 70 U/L Final     AST 01/24/2020 60* 0 - 45 U/L Final     Creatinine Urine 01/24/2020 57  mg/dL Final     Albumin Urine mg/L 01/24/2020 12  mg/L Final     Albumin Urine mg/g Cr 01/24/2020 21.38* 0 - 17 mg/g Cr Final     TSH 01/24/2020 2.17  0.40 - 4.00 mU/L Final   Office Visit on 12/27/2019   Component Date Value Ref Range Status     WBC 12/27/2019 8.0  4.0 - 11.0 10e9/L Final     RBC Count 12/27/2019 5.20  4.4 - 5.9 10e12/L Final     Hemoglobin 12/27/2019 14.9  13.3 - 17.7 g/dL Final     Hematocrit 12/27/2019 45.0  40.0 - 53.0 % Final     MCV 12/27/2019 87  78 - 100 fl Final     MCH 12/27/2019 28.7  26.5 - 33.0 pg Final     MCHC 12/27/2019 33.1  31.5 - 36.5 g/dL Final     RDW 12/27/2019 14.4  10.0 - 15.0 % Final     Platelet Count 12/27/2019 263  150 - 450 10e9/L Final     % Neutrophils 12/27/2019 45.0  % Final     % Lymphocytes 12/27/2019 38.1  % Final     % Monocytes 12/27/2019 13.7  % Final     % Eosinophils 12/27/2019 2.4  % Final     % Basophils 12/27/2019 0.8  % Final     Absolute Neutrophil 12/27/2019 3.6  1.6 - 8.3 10e9/L Final     Absolute Lymphocytes 12/27/2019 3.0  0.8 - 5.3 10e9/L Final     Absolute  "Monocytes 12/27/2019 1.1  0.0 - 1.3 10e9/L Final     Absolute Eosinophils 12/27/2019 0.2  0.0 - 0.7 10e9/L Final     Absolute Basophils 12/27/2019 0.1  0.0 - 0.2 10e9/L Final     Diff Method 12/27/2019 Automated Method   Final     Most recent labs reviewed and no new labs.     Family History:   Patient reported family history includes:   Family History   Problem Relation Age of Onset     Musculoskeletal Disorder Mother         back     Anxiety Disorder Mother      Colon Polyps Mother      Ulcerative Colitis Mother         and ischemic small intestine, surgery     Hypertension Mother      Breast Cancer Mother      Osteoporosis Mother      Diabetes Mother         Type 2, Diagnosed in 2014     Depression Mother         Takes Cymbalta to help with chronic pain + depx     Thyroid Disease Mother         Hypothyroidism     Obesity Mother         Under much better control latter half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

## 2020-12-02 NOTE — GROUP NOTE
"Process Group Note    PATIENT'S NAME: Joel Pineda  MRN:   [id number]  :   1975  ACCT. NUMBER: [id number]  DATE OF SERVICE: 20  START TIME: 10:00 AM  END TIME: 10:50 AM  FACILITATOR: Lenore French [facility]  TOPIC:  Process Group    Diagnoses:  296.33 (F33.2) Major Depressive Disorder, Recurrent Episode, Severe _.  4. Other Diagnoses that is relevant to services:   300.00 (F41.9) Unspecified Anxiety Disorder  301.83 (F60.3) Borderline Personality Disorder.      [facility] Mental Health Day Treatment  TRACK: 1B    NUMBER OF PARTICIPANTS: 7    Telemedicine Visit: The patient's condition can be safely assessed and treated via synchronous audio and visual telemedicine encounter.      Reason for Telemedicine Visit: Services only offered telehealth    Originating Site (Patient Location): Patient's home    Distant Site (Provider Location): Provider Remote Setting    Consent:  The patient/guardian has verbally consented to: the potential risks and benefits of telemedicine (video visit) versus in person care; bill my insurance or make self-payment for services provided; and responsibility for payment of non-covered services.     Mode of Communication:  Video Conference via [employer]    As the provider I attest to compliance with applicable laws and regulations related to telemedicine.            Data:    Session content: At the start of this group, patients were invited to check in by identifying themselves, describing their current emotional status, and identifying issues to address in this group.   Area(s) of treatment focus addressed in this session included Symptom Management and Personal Safety.    Tito reported feeling \"okay\" today but is in some back pain.  His goal is to work on behavior activation but also listen to his body to relax.  He reported being proud of getting up on time for group.   Client declined additional process time but contributed to group discussion and provided " feedback and support to peers.      Therapeutic Interventions/Treatment Strategies:  Psychotherapist offered support, feedback and validation.    Assessment:    Patient response:   Patient responded to session by accepting feedback, giving feedback and listening    Possible barriers to participation / learning include: and no barriers identified    Health Issues:   None reported       Substance Use Review:   Substance Use: No active concerns identified.    Mental Status/Behavioral Observations  Appearance:   Appropriate   Eye Contact:   Good   Psychomotor Behavior: Normal   Attitude:   Cooperative   Orientation:   All  Speech   Rate / Production: Normal    Volume:  Normal   Mood:    Depressed   Affect:    Appropriate   Thought Content:   Clear  Thought Form:  Coherent  Logical     Insight:    Good     Plan:     Safety Plan: No current safety concerns identified.  Recommended that patient call 911 or go to the local ED should there be a change in any of these risk factors.     Barriers to treatment: None identified    Patient Contracts (see media tab):  None    Substance Use: Not addressed in session     Continue or Discharge: Patient will continue in Adult Day Treatment (ADT)  as planned. Patient is likely to benefit from learning and using skills as they work toward the goals identified in their treatment plan.      Lenore French, [facility]  December 2, 2020

## 2020-12-02 NOTE — GROUP NOTE
Psychotherapy Group Note    PATIENT'S NAME: Joel Pineda  MRN:   [id number]  :   1975  ACCT. NUMBER: [id number]  DATE OF SERVICE: 20  START TIME: 11:00 AM  END TIME: 11:50 AM  FACILITATOR: Morenita Lau LPCC  TOPIC: MH EBP Group: Relationship Skills  [facility] Adult Mental Health Day Treatment  TRACK: 6A    NUMBER OF PARTICIPANTS: 7    Summary of Group / Topics Discussed:  Relationship Skills: Dependencies: Patients were provided with a general overview of different types of dependencies and how they relate to symptoms and functioning. The purpose is to help patients identify the different types of dependencies, how they may be impacted by them, and to gain awareness and skills to work towards healthier relationships.    Patient Session Goals / Objectives:    Familiarized patients with the concept of interpersonal relationships and their characteristics.      Discussed and practiced strategies to promote healthier understanding of interpersonal relationships with a focus on dependencies    Identified types of dependencies in patient s lives and how they impact their symptoms and functioning    Telemedicine Visit: The patient's condition can be safely assessed and treated via synchronous audio and visual telemedicine encounter.      Reason for Telemedicine Visit: Services only offered telehealth and due to COVID-19    Originating Site (Patient Location): Patient's home    Distant Site (Provider Location): Provider Remote Setting    Consent:  The patient/guardian has verbally consented to: the potential risks and benefits of telemedicine (video visit) versus in person care; bill my insurance or make self-payment for services provided; and responsibility for payment of non-covered services.     Mode of Communication:  Video Conference via [employer]    As the provider I attest to compliance with applicable laws and regulations related to telemedicine.        Patient Participation /  Response:  Fully participated with the group by sharing personal reflections / insights and openly received / provided feedback with other participants.    Demonstrated understanding of topics discussed through group discussion and participation, Demonstrated understanding of relationship skills and communication skills and Identified / Expressed personal readiness to incorporate effective communication skills    Treatment Plan:  Patient has an initial individualized treatment plan that was created as part of their diagnostic assessment / admission process.  A master individualized treatment plan is in the process of being developed with the patient and multi-disciplinary care team.    Morenita Lau, [facility]

## 2020-12-02 NOTE — TELEPHONE ENCOUNTER
Controlled Substance Refill Request for Oxycodone  Problem List Complete:  Yes    Patient is followed by Ksenia Lyles MD for ongoing prescription of pain medication.  All refills should only be approved by this provider, or covering partner.     Medication(s): oxy 5.   Maximum quantity per month: 40  Clinic visit frequency required: Q3  months      Controlled substance agreement: 6/23/2020  UDS: Aug 2019     Encounter-Level CSA - 04/04/2017:            Controlled Substance Agreement - Scan on 4/11/2017  1:30 PM : CONTROLLED SUBSTANCE AGREEMENT (below)                   Pain Clinic evaluation in the past: Yes     DIRE Total Score(s):  No flowsheet data found.     Last [place]:  12/2/2020    RX [place] ([place]) reviewed:  reviewed- no concerns    [place]  [email or web address]      Sapna Barragan RN, [facility] Triage

## 2020-12-04 ENCOUNTER — HOSPITAL ENCOUNTER (OUTPATIENT)
Dept: BEHAVIORAL HEALTH | Facility: CLINIC | Age: 45
End: 2020-12-04
Attending: PSYCHIATRY & NEUROLOGY
Payer: MEDICARE

## 2020-12-04 PROCEDURE — 90853 GROUP PSYCHOTHERAPY: CPT | Mod: PO | Performed by: COUNSELOR

## 2020-12-04 NOTE — GROUP NOTE
Psychoeducation Group Note    PATIENT'S NAME: Joel Pineda  MRN:   [id number]  :   1975  ACCT. NUMBER: [id number]  DATE OF SERVICE: 20  START TIME: 10:00 AM  END TIME: 10:50 AM  FACILITATOR: Morenita Lau LPCC  TOPIC: MH RN Group: Health Maintenance  [facility] Adult Mental Health Day Treatment  TRACK: 6A    NUMBER OF PARTICIPANTS: 6    Summary of Group / Topics Discussed:  Health Maintenance: Weekend planning: Patients were given time to complete a weekend plan of what they will do to promote wellness and sobriety over the weekend when they do not have the structure of group. Patients were encouraged to review progress on their treatment goals and were challenged to identify ways to work toward meeting them. Patients identified and discussed foreseeable barriers to success over the weekend and then developed a plan to overcome them. Patients reviewed their distress coping skills and social support network and discussed this with the group.       Patient Session Goals / Objectives:    ?    Identified activities to engage in that promote balance in wellness  ?    Distinguished possible barriers to success over the weekend and created a plan to overcome them  ?    Listed distress coping skills and identified social support network to utilize if in crisis during the weekend      Telemedicine Visit: The patient's condition can be safely assessed and treated via synchronous audio and visual telemedicine encounter.      Reason for Telemedicine Visit: Services only offered telehealth and due to COVID-19    Originating Site (Patient Location): Patient's home    Distant Site (Provider Location): Provider Remote Setting    Consent:  The patient/guardian has verbally consented to: the potential risks and benefits of telemedicine (video visit) versus in person care; bill my insurance or make self-payment for services provided; and responsibility for payment of non-covered services.     Mode of  Communication:  Video Conference via [employer]    As the provider I attest to compliance with applicable laws and regulations related to telemedicine.            Patient Participation / Response:  Fully participated with the group by sharing personal reflections / insights and openly received / provided feedback with other participants.    Demonstrated understanding of topics discussed through group discussion and participation and Identified / Expressed personal readiness to practice skills    Treatment Plan:  Patient has a current master individualized treatment plan.  See Epic treatment plan for more information.    Morenita Lau, [facility]

## 2020-12-04 NOTE — GROUP NOTE
Psychotherapy Group Note    PATIENT'S NAME: Joel Pineda  MRN:   [id number]  :   1975  ACCT. NUMBER: [id number]  DATE OF SERVICE: 20  START TIME: 11:00 AM  END TIME: 11:50 AM  FACILITATOR: Morenita Lau LPCC  TOPIC: MH EBP Group: Cognitive Restructuring  [facility] Adult Mental Health Day Treatment  TRACK: 6A    NUMBER OF PARTICIPANTS: 6    Summary of Group / Topics Discussed:  Cognitive Restructuring: Introduction and Overview: Patients were introduced to Cognitive Behavioral Therapy (CBT) as a way to identify ineffective thought patterns, understand factors that contribute to ineffective thought patterns, gain awareness of the impact of those thoughts on emotions and behavior, and learn methods for modifying thinking to achieve better mood regulation. Patients received a general overview of how ones thoughts influence feelings and behaviors in the context of CBT. Patients explored the development of their own thought patterns and how they impact daily functioning.      Patient Session Goals / Objectives:    Familiarize self with the interrelationship of thoughts, feelings and behavior.    Identify ineffective / unhelpful thought patterns and their impact on mood.    Telemedicine Visit: The patient's condition can be safely assessed and treated via synchronous audio and visual telemedicine encounter.      Reason for Telemedicine Visit: Services only offered telehealth and due to COVID-19    Originating Site (Patient Location): Patient's home    Distant Site (Provider Location): Provider Remote Setting    Consent:  The patient/guardian has verbally consented to: the potential risks and benefits of telemedicine (video visit) versus in person care; bill my insurance or make self-payment for services provided; and responsibility for payment of non-covered services.     Mode of Communication:  Video Conference via [employer]    As the provider I attest to compliance with applicable laws and  regulations related to telemedicine.               Patient Participation / Response:  Fully participated with the group by sharing personal reflections / insights and openly received / provided feedback with other participants.    Demonstrated understanding of topics discussed through group discussion and participation, Expressed understanding of the relationship between behaviors, thoughts, and feelings and Demonstrated knowledge of personal thought patterns and how they impact their mood and behavior.    Treatment Plan:  Patient has a current master individualized treatment plan.  See Epic treatment plan for more information.    Morenita Lau, [facility]

## 2020-12-04 NOTE — GROUP NOTE
"Process Group Note    PATIENT'S NAME: Joel Pineda  MRN:   [id number]  :   1975  ACCT. NUMBER: [id number]  DATE OF SERVICE: 20  START TIME:  9:00 AM  END TIME:  9:50 AM  FACILITATOR: Lenore French [facility]  TOPIC:  Process Group    Diagnoses:  296.33 (F33.2) Major Depressive Disorder, Recurrent Episode, Severe _.  4. Other Diagnoses that is relevant to services:   300.00 (F41.9) Unspecified Anxiety Disorder  301.83 (F60.3) Borderline Personality Disorder.      [facility]  TRACK: 6A    NUMBER OF PARTICIPANTS: 7    Telemedicine Visit: The patient's condition can be safely assessed and treated via synchronous audio and visual telemedicine encounter.      Reason for Telemedicine Visit: Services only offered telehealth    Originating Site (Patient Location): Patient's home    Distant Site (Provider Location): Provider Remote Setting    Consent:  The patient/guardian has verbally consented to: the potential risks and benefits of telemedicine (video visit) versus in person care; bill my insurance or make self-payment for services provided; and responsibility for payment of non-covered services.     Mode of Communication:  Video Conference via Simplist    As the provider I attest to compliance with applicable laws and regulations related to telemedicine.            Data:    Session content: At the start of this group, patients were invited to check in by identifying themselves, describing their current emotional status, and identifying issues to address in this group.   Area(s) of treatment focus addressed in this session included Symptom Management and Personal Safety.    Tito reported feeling \"discouraged\" today because of how much pain he was in yesterday due to sitting for 3 hours for group on Wednesday.  His goal for the day is to set some boundaries with his mother.  He plans to use effective communication and be assertive with her in asking that she not " call him so late in the day.  Client declined additional process time but contributed to group discussion and provided feedback and support to peers.      Therapeutic Interventions/Treatment Strategies:  Psychotherapist offered support, feedback and validation.    Assessment:    Patient response:   Patient responded to session by accepting feedback, giving feedback and listening    Possible barriers to participation / learning include: and no barriers identified    Health Issues:   None reported       Substance Use Review:   Substance Use: No active concerns identified.    Mental Status/Behavioral Observations  Appearance:   Appropriate   Eye Contact:   Good   Psychomotor Behavior: Normal   Attitude:   Cooperative   Orientation:   All  Speech   Rate / Production: Normal    Volume:  Normal   Mood:    Depressed  Irritable   Affect:    Appropriate   Thought Content:   Clear  Thought Form:  Coherent  Logical     Insight:    Good     Plan:     Safety Plan: No current safety concerns identified.  Recommended that patient call 911 or go to the local ED should there be a change in any of these risk factors.     Barriers to treatment: None identified    Patient Contracts (see media tab):  None    Substance Use: Provided encouragement towards sobriety    Provided support and affirmation for steps taken towards sobriety      Continue or Discharge: Patient will continue in Adult Day Treatment (ADT)  as planned. Patient is likely to benefit from learning and using skills as they work toward the goals identified in their treatment plan.      Lenore French, [facility]  December 4, 2020

## 2020-12-07 ENCOUNTER — VIRTUAL VISIT (OUTPATIENT)
Dept: GASTROENTEROLOGY | Facility: CLINIC | Age: 45
End: 2020-12-07
Payer: MEDICARE

## 2020-12-07 ENCOUNTER — DOCUMENTATION ONLY (OUTPATIENT)
Dept: SLEEP MEDICINE | Facility: CLINIC | Age: 45
End: 2020-12-07

## 2020-12-07 ENCOUNTER — TELEPHONE (OUTPATIENT)
Dept: GASTROENTEROLOGY | Facility: CLINIC | Age: 45
End: 2020-12-07

## 2020-12-07 ENCOUNTER — TELEPHONE (OUTPATIENT)
Dept: SLEEP MEDICINE | Facility: CLINIC | Age: 45
End: 2020-12-07

## 2020-12-07 DIAGNOSIS — G47.33 OSA (OBSTRUCTIVE SLEEP APNEA): ICD-10-CM

## 2020-12-07 DIAGNOSIS — K59.00 CONSTIPATION, UNSPECIFIED CONSTIPATION TYPE: ICD-10-CM

## 2020-12-07 DIAGNOSIS — K31.84 GASTROPARESIS: Primary | ICD-10-CM

## 2020-12-07 DIAGNOSIS — R11.0 NAUSEA: ICD-10-CM

## 2020-12-07 PROCEDURE — 99203 OFFICE O/P NEW LOW 30 MIN: CPT | Mod: 95 | Performed by: PHYSICIAN ASSISTANT

## 2020-12-07 RX ORDER — PRUCALOPRIDE 2 MG/1
2 TABLET, FILM COATED ORAL DAILY
Qty: 30 TABLET | Refills: 0 | Status: SHIPPED | OUTPATIENT
Start: 2020-12-07 | End: 2020-12-21

## 2020-12-07 RX ORDER — CLONAZEPAM 0.5 MG/1
1 TABLET ORAL AT BEDTIME
COMMUNITY
Start: 2020-12-01 | End: 2024-09-16

## 2020-12-07 RX ORDER — ONDANSETRON 8 MG/1
8 TABLET, ORALLY DISINTEGRATING ORAL EVERY 8 HOURS PRN
Qty: 30 TABLET | Refills: 3 | Status: SHIPPED | OUTPATIENT
Start: 2020-12-07 | End: 2021-04-19

## 2020-12-07 NOTE — PROGRESS NOTES
Patient's 01/27/2015 [facility]G sleep study is scored using the 3% hypopnea definition. Patient's Medicare insurance requires sleep study to be scored using the 4% rule. [facility] is working with [facility] [employer] to see if patient's sleep study can be re-scored at 4% to follow Medicare guidelines.       Note: If the study is unable to be re-scored to follow Medicare guidelines, patient will need to have a new diagnostic sleep study in hope that the patient qualifies under Medicare guidelines or patient could speak with sleep physician about other options.      [facility]  [address]  [address]  [place] [address]  Office: [phone]  Fax: [phone]

## 2020-12-07 NOTE — TELEPHONE ENCOUNTER
[facility] Call Center    Phone Message    May a detailed message be left on voicemail: yes     Reason for Call: Other: pt calling back for rooming, please call him back     Action Taken: Message routed to:  Adult Clinics: Gastroenterology (GI) p [id number]    Travel Screening: Not Applicable

## 2020-12-07 NOTE — PROGRESS NOTES
"Joel Pineda is a 45 year old male who is being evaluated via a billable video visit.      The patient has been notified of following:     \"This video visit will be conducted via a call between you and your physician/provider. We have found that certain health care needs can be provided without the need for an in-person physical exam.  This service lets us provide the care you need with a video conversation.  If a prescription is necessary we can send it directly to your pharmacy.  If lab work is needed we can place an order for that and you can then stop by our lab to have the test done at a later time.    Video visits are billed at different rates depending on your insurance coverage.  Please reach out to your insurance provider with any questions.    If during the course of the call the physician/provider feels a video visit is not appropriate, you will not be charged for this service.\"    Patient has given verbal consent for Video visit? Yes  How would you like to obtain your AVS? MyChart  If you are dropped from the video visit, the video invite should be resent to: Text to cell phone: [phone]  Will anyone else be joining your video visit? No       Ksenia Gil LPN        Video-Visit Details    Type of service:  Video Visit    Video Start Time: 1:01 PM  Video End Time: 1:24 PM    Originating Location (pt. Location): Home    Distant Location (provider location):  [facility]     Platform used for Video Visit: Sophia Wiggins PA-C          GASTROENTEROLOGY NEW PATIENT VIDEO VISIT         CC/REFERRING MD:    Ksenia Lyles    REASON FOR CONSULTATION:   Referred by Ksenia Lyles for Consult (Gastroparesis; patient reports he can not take reglan)      HISTORY OF PRESENT ILLNESS:    Joel Pineda is 45 year old male who presents for evaluation of gastroparesis.     His pmhx is significant for dysautonomia, POTS, ankylosing spondylitis, DM, " MDD, recurrent diverticulitis s/p partial resection.     He was diagnosed with gastroparesis via 4 hr GES in January 2020. Testing completed through [facility] and noted 58% of stomach contents at 4 hours per patient report. He was previously on reglan for the past 2 years for nausea. Over the past 2 years he notes that he did increase his dose from reglan 5mg to 10mg TID. He did find this helpful for his nausea and for his recently diagnosed gastroparesis. He however more recently was advised to discontinue reglan as he has been on the medication for greater than 3 months. Since discontinuing the reglan the nausea is no longer controlled. He was recently started on compazine (prochlorperazine) in it's place for the nausea.  He recently had an upper endoscopy through [facility] which was unremarkable.     He does have chronic pain syndrome and post herpetic neuralgia and is on chronic narcotic pain medication. He previously did have trouble with diarrhea. He was seen and evaluated by [facility]. Work up included negative celiac serology and negative colonoscopy. Ultimately he was switched from metformin to glipizide and this resolved his diarrhea. He is back to constipation, of which he has a history. He is taking miralax for his constipation.         PROBLEM LIST  Patient Active Problem List    Diagnosis Date Noted     Major depressive disorder, recurrent episode, severe (H) 12/02/2020     Priority: Medium     Dysautonomia (H) 08/18/2020     Priority: Medium     PHN (postherpetic neuralgia) 07/15/2020     Priority: Medium     POTS (postural orthostatic tachycardia syndrome) 03/24/2020     Priority: Medium     Orthostatic dizziness 03/18/2020     Priority: Medium     Gastroparesis 01/24/2020     Priority: Medium     Hypertriglyceridemia 12/16/2019     Priority: Medium     Ingrown toenail 07/03/2019     Priority: Medium     History of pulmonary embolism 01/28/2019     Priority: Medium     Peripheral polyneuropathy 01/23/2019      Priority: Medium     Type 2 diabetes mellitus with complication, without long-term current use of insulin (H) 12/24/2018     Priority: Medium     DDD (degenerative disc disease), lumbar 11/13/2018     Priority: Medium     Morbid obesity due to hypertriglyceridemia (H) 05/17/2018     Priority: Medium     Fatty infiltration of liver 05/17/2018     Priority: Medium     Ankylosing spondylitis of sacral region (H) 02/28/2018     Priority: Medium     Chronic pain syndrome 04/04/2017     Priority: Medium     Patient is followed by Ksenia Lyles MD for ongoing prescription of pain medication.  All refills should only be approved by this provider, or covering partner.    Medication(s): oxy 5.   Maximum quantity per month: 40  Clinic visit frequency required: Q3  months     Controlled substance agreement: 6/23/2020  UDS: Aug 2019    Encounter-Level CSA - 04/04/2017:          Controlled Substance Agreement - Scan on 4/11/2017  1:30 PM : CONTROLLED SUBSTANCE AGREEMENT (below)              Pain Clinic evaluation in the past: Yes    DIRE Total Score(s):  No flowsheet data found.    Last [facility] website verification:  12/2/2020   [email or web address]        Essential hypertension with goal blood pressure less than 140/90 11/01/2016     Priority: Medium     B12 deficiency 08/29/2016     Priority: Medium     Irritable bowel syndrome with diarrhea 08/19/2016     Priority: Medium     Chronic midline low back pain without sciatica 06/06/2016     Priority: Medium     Bipolar 2 disorder (H) 12/28/2015     Priority: Medium     Acquired hypothyroidism 10/08/2015     Priority: Medium     Facet arthritis of cervical region 10/06/2015     Priority: Medium     Intracranial arachnoid cyst 10/02/2015     Priority: Medium     LLOYD (obstructive sleep apnea)- mild (AHI 11) 01/31/2015     Priority: Medium     Study Date: 1/27/2015- (308.1 lbs)  Snoring was reported assoft to moderate.  Lowest oxygen saturation was 88.0%. Apnea/Hypopnea  Index was 11.5 events per hour. REM was not seen.  The supine AHI is 12.7.  RDI was  25.7. PLM index was 0 per hour.  Sleep study 10/31/16 [facility] (292#) CPAP 8 cm effective       Anxiety 01/12/2015     Priority: Medium     GERD (gastroesophageal reflux disease)      Priority: Medium     Chronic nonallergic rhinitis      Priority: Medium     RAST all NEGATIVE for environmental allergens, IgE= 6 (normal)       Hyperlipidemia LDL goal <100      Priority: Medium     Major depressive disorder, recurrent episode (H)      Priority: Medium     Multiple psych providers - they manage meds  August 2015: Provigil induced severe mood dis-function       Intermittent asthma      Priority: Medium     Exercise-induced       Diverticulosis 09/01/2006     Priority: Medium     Recurrent diverticulitis, S/p sigmoid resction 8/2013         PERTINENT PAST MEDICAL HISTORY:  (I personally reviewed this history with the patient at today's visit)   Past Medical History:   Diagnosis Date     Acne      Acquired hypothyroidism      Allergic state      Ankylosing spondylitis lumbar region (H)      Ankylosing spondylitis of sacral region (H)      Anxiety      Bipolar 2 disorder (H)      Chest pain     Chest pain, regulated w/BP meds. Clear arteries.     Chronic pain      DDD (degenerative disc disease), lumbar      Depressive disorder      Diabetes (H)      Diverticulosis      Facet arthritis of cervical region      Gastroesophageal reflux disease      Hypertension      IBS (irritable bowel syndrome)      Intracranial arachnoid cyst      Polyneuropathy      Pulmonary embolism (H)      Skin exam, screening for cancer 12/3/2013     Sleep apnea      Uncomplicated asthma          PREVIOUS SURGERIES: (I personally reviewed this history with the patient at today's visit)   Past Surgical History:   Procedure Laterality Date     BACK SURGERY  10/07    lumbar discectomy L5-S1     COLONOSCOPY      Note: colonoscopy scheduled with [facility] on Friday, 9/4/15      COSMETIC SURGERY  2012    Nose Exterior - functional     GI SURGERY  August 2013    Sigmoidectomy     HERNIA REPAIR, UMBILICAL  8/23/11    small, Dr. Evan Beavers     INCISION AND DRAINAGE, ABSCESS, COMPLEX  8/23/11    umbilical, Dr. Evan Beavers     LAPAROSCOPIC ASSISTED COLECTOMY LEFT (DESCENDING)  8/15/2013    Procedure: LAPAROSCOPIC ASSISTED COLECTOMY LEFT (DESCENDING);  Laparoscopic Hand Assisted Sigmoid Resection, Mobilization of Splenic Fissure, coloproctoscopy, *Latex Free Room* Anesthesia General with Pain block  ;  Surgeon: Aurora Justice MD;  Location: [facility] OR     NERVE SURGERY  8/18/11    RF ablation @ L3-S1 @ [facility]     RECONSTRUCT NOSE AND SEPTUM (FUNCTIONAL)  10/14/2011    Procedure:RECONSTRUCT NOSE AND SEPTUM (FUNCTIONAL); Functional Septorhinoplasty, Turbinate Reduction, ; Surgeon:CEDRIC CUEVAS; Location:[facility] OR     SINUS SURGERY  10/1/01    ethmoidectomy chronic sinusitis         ALLERGIES:     Allergies   Allergen Reactions     Amoxicillin-Pot Clavulanate Difficulty breathing     Banana Shortness Of Breath     Pt reports organic Banana is okay.      Nitroglycerin Palpitations     Penicillins Anaphylaxis     Provigil [Modafinil] Shortness Of Breath     headache     Gadolinium Hives and Itching     Patient was premedicated for the contrast allergy. He did still have a reaction a few hours after injection. Hives and itching. Dr. Gomez told tech to inform pt he should only have contrast again in the future when premedicated and at a hospital. Not at an outpatient facility.      Ketoconazole      Topical cream caused swelling and itching     Dye [Contrast Dye] Other (See Comments) and Hives     Moderate flushing, CT contrast     Golimumab      Hives, bradycardia, face swelling     Neurontin [Gabapentin] Hives     Moderate hives     Nortriptyline Hives     Varicella Virus Vaccine Live      Rash     Flagyl [Metronidazole Hcl] Palpitations and Hives     Latex Rash     Metronidazole  Palpitations, Other (See Comments) and Rash     dizziness (versus ciprofloxacin taken at same time)     No Clinical Screening - See Comments Rash     Nitrile gloves       PERTINENT MEDICATIONS:    Current Outpatient Medications:      acetaminophen 500 MG CAPS, Take 500 mg by mouth every 4 hours as needed, Disp: 60 capsule, Rfl:      albuterol (PROAIR HFA/PROVENTIL HFA/VENTOLIN HFA) 108 (90 Base) MCG/ACT inhaler, Inhale 2 puffs into the lungs every 4 hours as needed for shortness of breath / dyspnea or wheezing, Disp: 8.5 g, Rfl: 11     albuterol (PROVENTIL) (2.5 MG/3ML) 0.083% neb solution, Take 1 vial (2.5 mg) by nebulization every 6 hours as needed for shortness of breath / dyspnea or wheezing, Disp: 200 mL, Rfl: 3     aspirin (ASA) 81 MG tablet, Take 81 mg by mouth daily , Disp: , Rfl:      cetirizine (ZYRTEC) 10 MG tablet, Take 1 tablet (10 mg) by mouth At Bedtime, Disp: 30 tablet, Rfl: 11     cholecalciferol (VITAMIN D3) 70751 units (1250 mcg) capsule, Take one capsule every two weeks., Disp: 24 capsule, Rfl: 1     clonazePAM (KLONOPIN) 0.5 MG tablet, Take 2 tablets by mouth At Bedtime, Disp: , Rfl:      cyanocobalamin (CYANOCOBALAMIN) 1000 MCG/ML injection, Inject 1 mL (1,000 mcg) into the muscle every 30 days, Disp: 10 mL, Rfl: 0     DULoxetine (CYMBALTA) 60 MG capsule, Take 60 mg by mouth 2 times daily , Disp: , Rfl:      EPINEPHrine (EPIPEN/ADRENACLICK/OR ANY BX GENERIC EQUIV) 0.3 MG/0.3ML injection 2-pack, Inject 0.3 mLs (0.3 mg) into the muscle once as needed for anaphylaxis, Disp: 0.6 mL, Rfl: 3     etanercept (ENBREL SURECLICK) 50 MG/ML autoinjector, Inject 50 mg Subcutaneous once a week Hold for signs of infection, and seek medical attention., Disp: 4 mL, Rfl: 6     famotidine (PEPCID) 20 MG tablet, Prior to administration of Humira every 2 weeks (Patient taking differently: Take 20 mg by mouth every 7 days Prior to Enbrel Injections to prevent skin reaction), Disp: , Rfl:      fluticasone (FLONASE) 50  MCG/ACT nasal spray, Spray 1 spray into both nostrils daily, Disp: , Rfl:      fluticasone-salmeterol (ADVAIR) 500-50 MCG/DOSE inhaler, Inhale 1 puff into the lungs every 12 hours, Disp: 3 Inhaler, Rfl: 3     glipiZIDE (GLUCOTROL XL) 5 MG 24 hr tablet, Take 1 tablet (5 mg) by mouth daily ++NO FURTHER REFILLS without FOLLOW UP+++, Disp: 30 tablet, Rfl: 0     lamoTRIgine (LAMICTAL) 100 MG tablet, Take 200 mg by mouth daily, Disp: , Rfl:      levothyroxine (SYNTHROID/LEVOTHROID) 75 MCG tablet, TAKE 1 TABLET EVERY MORNING, Disp: 90 tablet, Rfl: 0     lidocaine (XYLOCAINE) 5 % external ointment, Apply topically 4 times daily as needed (pain), Disp: 50 g, Rfl: 2     metoprolol succinate ER (TOPROL-XL) 200 MG 24 hr tablet, Take 1 tablet (200 mg) by mouth 2 times daily, Disp: 180 tablet, Rfl: 0     montelukast (SINGULAIR) 10 MG tablet, Take 1 tablet (10 mg) by mouth every evening, Disp: 90 tablet, Rfl: 1     nabumetone (RELAFEN) 500 MG tablet, Take 1-2 tablets (500-1,000 mg) by mouth 2 times daily (with meals) as needed for back/joint pain. ++NO FURTHER REFILLS without FOLLOW UP and labs+++, Disp: 60 tablet, Rfl: 0     naloxone (NARCAN) 4 MG/0.1ML nasal spray, Spray 1 spray (4 mg) into one nostril alternating nostrils as needed for opioid reversal every 2-3 minutes until assistance arrives, Disp: 0.2 mL, Rfl: 0     omega-3 acid ethyl esters (LOVAZA) 1 g capsule, TAKE 2 CAPSULES BY MOUTH TWICE DAILY., Disp: 360 capsule, Rfl: 1     omeprazole 20 MG tablet, Take 20 mg by mouth daily , Disp: , Rfl:      order for DME, Equipment being ordered: Assure Compression stockings (ANNETTA) Large, closed toe, thigh-high 30-40 compression, Disp: 2 Units, Rfl: 3     order for DME, Equipment being ordered: lumbosacral belt/brace, Disp: 1 Units, Rfl: 0     order for DME, Respironics REMSTAR 60 Series Auto CPAP 9-13 cm H2O, Wisp nasal mask w/a large cushion and a chinstrap, Disp: , Rfl:      oxyCODONE (ROXICODONE) 5 MG tablet, Take 1-2 tablets  (5-10 mg) by mouth every 6 hours as needed for pain (maximum 4 tablet(s) per day), Disp: 35 tablet, Rfl: 0     polyethylene glycol (MIRALAX) 17 g packet, Take 1 packet by mouth daily, Disp: , Rfl:      pregabalin (LYRICA) 150 MG capsule, Take 1 capsule (150 mg) by mouth 3 times daily (Patient taking differently: Take 150 mg by mouth 2 times daily 1 tablet in the AM and 2 tablets in the evening), Disp: 270 capsule, Rfl: 1     QUEtiapine (SEROQUEL) 25 MG tablet, Taking 75 MG @ HS per pt report, Disp: , Rfl:      ramipril (ALTACE) 10 MG capsule, Take 1 capsule (10 mg) by mouth daily, Disp: 90 capsule, Rfl: 1     Rectal Protectant-Emollient (CALMOL-4) 76-10 % SUPP, Place 1 suppository rectally 3 times daily as needed , Disp: , Rfl:      rizatriptan (MAXALT-MLT) 5 MG ODT, Take 1 tablet (5 mg) by mouth at onset of headache for migraine, Disp: 30 tablet, Rfl: 5     rosuvastatin (CRESTOR) 40 MG tablet, Take 1 tablet (40 mg) by mouth daily, Disp: 90 tablet, Rfl: 0     syringe, disposable, (BD TUBERCULIN SYRINGE) 1 ML MISC, Equipment being ordered: 1 ml tuberculin syringes to be used for Vitamin B12 injections., Disp: 12 each, Rfl: 11     vitamin B complex with vitamin C (STRESS TAB) tablet, Take 1 tablet by mouth daily, Disp: , Rfl:      ZINC SULFATE-VITAMIN C MT, Take 1 tablet by mouth daily, Disp: , Rfl:      diltiazem 2% in PLO gel, To anal opening three times daily.  Use a pea-sized amount.  Store at room temperature. (Patient not taking: Reported on 12/7/2020), Disp: 60 g, Rfl: 0    SOCIAL HISTORY:  Social History     Socioeconomic History     Marital status: Single     Spouse name: Not on file     Number of children: Not on file     Years of education: Not on file     Highest education level: Not on file   Occupational History     Occupation:      Employer: [employer]     Occupation: paraprofessional     Comment: [employer]     Employer: [employer]   Social Needs     Financial resource strain: Not on  file     Food insecurity     Worry: Not on file     Inability: Not on file     Transportation needs     Medical: Not on file     Non-medical: Not on file   Tobacco Use     Smoking status: Never Smoker     Smokeless tobacco: Never Used   Substance and Sexual Activity     Alcohol use: Not Currently     Alcohol/week: 0.0 standard drinks     Drinks per session: 1 or 2     Binge frequency: Weekly     Comment: occ 1/week     Drug use: No     Sexual activity: Never     Partners: Female   Lifestyle     Physical activity     Days per week: Not on file     Minutes per session: Not on file     Stress: Not on file   Relationships     Social connections     Talks on phone: Not on file     Gets together: Not on file     Attends Yazidism service: Not on file     Active member of club or organization: Not on file     Attends meetings of clubs or organizations: Not on file     Relationship status: Not on file     Intimate partner violence     Fear of current or ex partner: Not on file     Emotionally abused: Not on file     Physically abused: Not on file     Forced sexual activity: Not on file   Other Topics Concern     Parent/sibling w/ CABG, MI or angioplasty before 65F 55M? No      Service Not Asked     Blood Transfusions Not Asked     Caffeine Concern Not Asked     Occupational Exposure Not Asked     Hobby Hazards Not Asked     Sleep Concern Yes     Stress Concern Yes     Weight Concern Not Asked     Special Diet Not Asked     Back Care Yes     Exercise Not Asked     Bike Helmet Not Asked     Seat Belt Yes     Self-Exams Not Asked   Social History Narrative     Not on file       FAMILY HISTORY: (I personally reviewed this history with the patient at today's visit)  Family History   Problem Relation Age of Onset     Musculoskeletal Disorder Mother         back     Anxiety Disorder Mother      Colon Polyps Mother      Ulcerative Colitis Mother         and ischemic small intestine, surgery     Hypertension Mother       "Breast Cancer Mother      Osteoporosis Mother      Diabetes Mother         Type 2, Diagnosed in 2014     Depression Mother         Takes Cymbalta to help with chronic pain + depx     Thyroid Disease Mother         Hypothyroidism     Obesity Mother         Under much better control latter half of 2015     Musculoskeletal Disorder Father         back     Substance Abuse Father      Hypertension Father      Hyperlipidemia Father      Depression Father         Off meds for many years. Seems \"ok\"     Heart Disease Maternal Grandmother      Heart Disease Maternal Grandfather      Psychotic Disorder Paternal Grandfather      Suicide Paternal Grandfather      Depression Paternal Grandfather         Pediatrician. Committed suicide by pistol in 1990.     Musculoskeletal Disorder Brother         back     Depression Brother         Expressed as anger and moodiness     Substance Abuse Brother      Substance Abuse Sister      Depression Sister         Mental Health Therapist, yet no anti-depressants?     Anxiety Disorder Sister         Mental Health Therapist, yet no anti-anxiety meds?     Other Cancer Other         Bladder Cancer - Fatal     Substance Abuse Brother      Colon Cancer No family hx of      Crohn's Disease No family hx of      Anesthesia Reaction No family hx of      Cancer No family hx of         No family history of skin cancer       ROS: 10 point ROS neg other than the symptoms noted above in the HPI.      PHYSICAL EXAMINATION:  Constitutional: aaox3, cooperative, pleasant, not dyspneic/diaphoretic, no acute distress      GENERAL: Healthy, alert and no distress  EYES: Eyes grossly normal to inspection.  No discharge or erythema, or obvious scleral/conjunctival abnormalities.  RESP: No audible wheeze, cough, or visible cyanosis.  No visible retractions or increased work of breathing.    SKIN: Visible skin clear. No significant rash, abnormal pigmentation or lesions.  NEURO: Cranial nerves grossly intact.  " Mentation and speech appropriate for age.  PSYCH: Mentation appears normal, affect normal/bright, judgement and insight intact, normal speech and appearance well-groomed.            ASSESSMENT/PLAN:    Joel Pineda is a 45 year old male who presents for evaluation of GI concerns.     Patient diagnosed with gastroparesis in January of this year through 4 hr GES. Likely related to hx of DM, autonomic dysfunction and hx of chronic pain medications. He was noted to have roughly 60% retention of stomach contents at 4 hours. He was previously on reglan prior for hx of nausea and continued this until more recently when advised to discontinue due to concerns with risks/side effects. He is need of an alternative medication for nausea. He has been on zofran in the past which did work for him. Will restart zofran. We did also review management of gastroparesis today. Discussed dietary changes as the mainstay of therapy. Recommended referral to nutritionist. Patient has met with nutritionist within the last year and notes that this is only covered by his insurance once yearly. He does have hx of constipation likely related to his pain medications and is on miralax daily. Reviewed importance of treating her constipation and how this can affect his gastroparesis. Recommended trial of motegrity for his constipation as this is a pro motility agent and could also help with his gastroparesis.      Follow up in 3 months to check progress, sooner if needed.     Gastroparesis  Nausea  Constipation, unspecified constipation type      Medications ordered today:  - ondansetron (ZOFRAN-ODT) 8 MG ODT tab  Dispense: 30 tablet; Refill: 3  - Prucalopride Succinate (MOTEGRITY) 2 MG TABS  Dispense: 30 tablet; Refill: 0        Thank you for this consultation.  It was a pleasure to participate in the care of this patient; please contact us with any further questions.      Sofía Wiggins PA-C  Gastroenterology  [facility]  [place]

## 2020-12-08 ENCOUNTER — HOSPITAL ENCOUNTER (OUTPATIENT)
Dept: BEHAVIORAL HEALTH | Facility: CLINIC | Age: 45
End: 2020-12-08
Attending: PSYCHIATRY & NEUROLOGY
Payer: MEDICARE

## 2020-12-08 ENCOUNTER — MYC MEDICAL ADVICE (OUTPATIENT)
Dept: PODIATRY | Facility: CLINIC | Age: 45
End: 2020-12-08

## 2020-12-08 ENCOUNTER — TELEPHONE (OUTPATIENT)
Dept: BEHAVIORAL HEALTH | Facility: CLINIC | Age: 45
End: 2020-12-08

## 2020-12-08 ENCOUNTER — VIRTUAL VISIT (OUTPATIENT)
Dept: PHARMACY | Facility: CLINIC | Age: 45
End: 2020-12-08
Payer: COMMERCIAL

## 2020-12-08 DIAGNOSIS — F41.8 DEPRESSION WITH ANXIETY: ICD-10-CM

## 2020-12-08 DIAGNOSIS — G47.00 INSOMNIA, UNSPECIFIED TYPE: ICD-10-CM

## 2020-12-08 DIAGNOSIS — K31.84 GASTROPARESIS: ICD-10-CM

## 2020-12-08 DIAGNOSIS — J45.31 MILD PERSISTENT ASTHMA WITH ACUTE EXACERBATION: ICD-10-CM

## 2020-12-08 DIAGNOSIS — E11.9 TYPE 2 DIABETES MELLITUS WITHOUT COMPLICATION, WITHOUT LONG-TERM CURRENT USE OF INSULIN (H): Primary | ICD-10-CM

## 2020-12-08 PROCEDURE — 99607 MTMS BY PHARM ADDL 15 MIN: CPT | Mod: TEL | Performed by: PHARMACIST

## 2020-12-08 PROCEDURE — 90853 GROUP PSYCHOTHERAPY: CPT | Mod: GT | Performed by: COUNSELOR

## 2020-12-08 PROCEDURE — 90853 GROUP PSYCHOTHERAPY: CPT | Mod: PO | Performed by: COUNSELOR

## 2020-12-08 PROCEDURE — 99606 MTMS BY PHARM EST 15 MIN: CPT | Mod: TEL | Performed by: PHARMACIST

## 2020-12-08 ASSESSMENT — COLUMBIA-SUICIDE SEVERITY RATING SCALE - C-SSRS
1. IN THE PAST MONTH, HAVE YOU WISHED YOU WERE DEAD OR WISHED YOU COULD GO TO SLEEP AND NOT WAKE UP?: NO
1. IN YOUR LIFETIME, HAVE YOU WISHED YOU WERE DEAD OR WISHED YOU COULD GO TO SLEEP AND NOT WAKE UP?: YES
1. IN THE PAST MONTH, HAVE YOU WISHED YOU WERE DEAD OR WISHED YOU COULD GO TO SLEEP AND NOT WAKE UP?: YES

## 2020-12-08 NOTE — LETTER
My Asthma Action Plan    Name: Joel Pineda   YOB: 1975  Date: 12/8/2020   My doctor: Radha Mcdonald [facility]   My clinic: [facility]        My Control Medicine: Fluticasone propionate + salmeterol (Advair Diskus or Wixela Inhub) -  500/50 mcg 1 puff twice daily  My Rescue Medicine: Albuterol (Proair/Ventolin/Proventil HFA) 2-4 puffs EVERY 4 HOURS as needed. Use a spacer if recommended by your provider.  My Oral Steroid Medicine: None My Asthma Severity:   Mild Persistent  Know your asthma triggers: cold air and polutants               GREEN ZONE   Good Control    I feel good    No cough or wheeze    Can work, sleep and play without asthma symptoms       Take your asthma control medicine every day.     1. If exercise triggers your asthma, take your rescue medication    15 minutes before exercise or sports, and    During exercise if you have asthma symptoms  2. Spacer to use with inhaler: If you have a spacer, make sure to use it with your inhaler             YELLOW ZONE Getting Worse  I have ANY of these:    I do not feel good    Cough or wheeze    Chest feels tight    Wake up at night   1. Keep taking your Green Zone medications  2. Start taking your rescue medicine:    every 20 minutes for up to 1 hour. Then every 4 hours for 24-48 hours.  3. If you stay in the Yellow Zone for more than 12-24 hours, contact your doctor.  4. If you do not return to the Green Zone in 12-24 hours or you get worse, start taking your oral steroid medicine if prescribed by your provider.           RED ZONE Medical Alert - Get Help  I have ANY of these:    I feel awful    Medicine is not helping    Breathing getting harder    Trouble walking or talking    Nose opens wide to breathe       1. Take your rescue medicine NOW  2. If your provider has prescribed an oral steroid medicine, start taking it NOW  3. Call your doctor NOW  4. If you are still in the Red Zone after 20 minutes and you have not  reached your doctor:    Take your rescue medicine again and    Call 911 or go to the emergency room right away    See your regular doctor within 2 weeks of an Emergency Room or Urgent Care visit for follow-up treatment.          Annual Reminders:  Meet with Asthma Educator,  Flu Shot in the Fall, consider Pneumonia Vaccination for patients with asthma (aged 19 and older).    Pharmacy:    [facility] - [place], [place] - [address] [facility] AT PORTAL TO REGISTERED [facility] SITES  [id number] - [place], [place] [address]  [facility][id number] - [place], [place] - [address] [facility] - [place], [place] - [address]  WRITTEN PRESCRIPTION REQUESTED  [facility] - [facility] - [place], [place] - [address]  [id number] - [place], [place] - [address]  [facility] [address] [place] [address]  [facility] - [facility] PATIENT ASSISTANCE PROGRAM    Electronically signed by Radha Mcdonald LAURYN   Date: 12/08/20                      Asthma Triggers  How To Control Things That Make Your Asthma Worse    Triggers are things that make your asthma worse.  Look at the list below to help you find your triggers and what you can do about them.  You can help prevent asthma flare-ups by staying away from your triggers.      Trigger                                                          What you can do   Cigarette Smoke  Tobacco smoke can make asthma worse. Do not allow smoking in your home, car or around you.  Be sure no one smokes at a child s day care or school.  If you smoke, ask your health care provider for ways to help you quit.  Ask family members to quit too.  Ask your health care provider for a referral to Quit Plan to help you quit smoking, or call 4-198-487-PLAN.     Colds, Flu, Bronchitis  These are common  triggers of asthma. Wash your hands often.  Don t touch your eyes, nose or mouth.  Get a flu shot every year.     Dust Mites  These are tiny bugs that live in cloth or carpet. They are too small to see. Wash sheets and blankets in hot water every week.   Encase pillows and mattress in dust mite proof covers.  Avoid having carpet if you can. If you have carpet, vacuum weekly.   Use a dust mask and HEPA vacuum.   Pollen and Outdoor Mold  Some people are allergic to trees, grass, or weed pollen, or molds. Try to keep your windows closed.  Limit time out doors when pollen count is high.   Ask you health care provider about taking medicine during allergy season.     Animal Dander  Some people are allergic to skin flakes, urine or saliva from pets with fur or feathers. Keep pets with fur or feathers out of your home.    If you can t keep the pet outdoors, then keep the pet out of your bedroom.  Keep the bedroom door closed.  Keep pets off cloth furniture and away from stuffed toys.     Mice, Rats, and Cockroaches   Some people are allergic to the waste from these pests.   Cover food and garbage.  Clean up spills and food crumbs.  Store grease in the refrigerator.   Keep food out of the bedroom.   Indoor Mold  This can be a trigger if your home has high moisture. Fix leaking faucets, pipes, or other sources of water.   Clean moldy surfaces.  Dehumidify basement if it is damp and smelly.   Smoke, Strong Odors, and Sprays  These can reduce air quality. Stay away from strong odors and sprays, such as perfume, powder, hair spray, paints, smoke incense, paint, cleaning products, candles and new carpet.   Exercise or Sports  Some people with asthma have this trigger. Be active!  Ask your doctor about taking medicine before sports or exercise to prevent symptoms.    Warm up for 5-10 minutes before and after sports or exercise.     Other Triggers of Asthma  Cold air:  Cover your nose and mouth with a scarf.  Sometimes laughing or  crying can be a trigger.  Some medicines and food can trigger asthma.

## 2020-12-08 NOTE — PATIENT INSTRUCTIONS
Recommendations from today's MTM visit:                                                      No medication recommendations made today.    It was great to speak with you today.  I value your experience and would be very thankful for your time with providing feedback on our clinic survey. You may receive a survey via email or text message in the next few days.     Next MTM visit: 8 weeks    To schedule another MTM appointment, please call the clinic directly or you may call the MTM scheduling line at [phone] or toll-free at [phone].     My Clinical Pharmacist's contact information:                                                      It was a pleasure talking with you today!  Please feel free to contact me with any questions or concerns you have.      Radha Mcdonald, Pharm.D, BCACP  Medication Therapy Management Pharmacist

## 2020-12-08 NOTE — GROUP NOTE
Psychotherapy Group Note    PATIENT'S NAME: Joel Pineda  MRN:   [id number]  :   1975  ACCT. NUMBER: [id number]  DATE OF SERVICE: 20  START TIME: 11:00 AM  END TIME: 11:50 AM  FACILITATOR: Morenita Lau LPCC  TOPIC: MH EBP Group: Coping Skills  [facility] Adult Mental Health Day Treatment  TRACK: 6A    NUMBER OF PARTICIPANTS: 7    Summary of Group / Topics Discussed:  Coping Skills: Meditation: Patients learned about meditation and explored how and when to utilize it to increase focus, reduce mental health symptoms, decrease physical tension, and improve mental well-being.  Approaches to meditation were presented as a means of increasing self-awareness. The benefits of various meditation practices were discussed, as well as barriers to utilization of this coping strategy.     Patient Session Goals / Objectives:    Understand the purpose and efficacy of using meditation modalities to reduce stress / symptoms.    Review / discuss situations in daily life that cause distress, where establishing a meditation routine or meditating as needed may improve functioning.      Verbalize understanding of how and when to apply grounding strategies to reduce distress and increase presence in the moment.    Practice meditation and address barriers to use in daily life.    Telemedicine Visit: The patient's condition can be safely assessed and treated via synchronous audio and visual telemedicine encounter.      Reason for Telemedicine Visit: Services only offered telehealth and due to COVID-19    Originating Site (Patient Location): Patient's home    Distant Site (Provider Location): Provider Remote Setting    Consent:  The patient/guardian has verbally consented to: the potential risks and benefits of telemedicine (video visit) versus in person care; bill my insurance or make self-payment for services provided; and responsibility for payment of non-covered services.     Mode of Communication:  Video  Conference via [employer]    As the provider I attest to compliance with applicable laws and regulations related to telemedicine.          Patient Participation / Response:  Fully participated with the group by sharing personal reflections / insights and openly received / provided feedback with other participants.    Demonstrated understanding of topics discussed through group discussion and participation and Expressed understanding of the relevance / importance of coping skills at distressing times in life    Treatment Plan:  Patient has an initial individualized treatment plan that was created as part of their diagnostic assessment / admission process.  A master individualized treatment plan is in the process of being developed with the patient and multi-disciplinary care team.    Morenita Lau, [facility]

## 2020-12-08 NOTE — ADDENDUM NOTE
Encounter addended by: Lenore French [facility] on: 12/8/2020 12:17 PM   Actions taken: Flowsheet accepted

## 2020-12-08 NOTE — PROGRESS NOTES
MTM ENCOUNTER  SUBJECTIVE/OBJECTIVE:                           Joel Pineda is a 45 year old male called for a follow-up visit. He was referred to me from Ksenia Lyles.  Today's visit is a follow-up MTM visit from 10/7/2020.     Reason for visit: Patient is concerned that he is not able to get refills on relafen or glipizide due to needing updated labs.    Allergies/ADRs: Reviewed in Epic  Tobacco: No tobacco use  Alcohol: none  Caffeine: not assessed today  Activity: patient will start exercising, pool and walking track  PMH: Reviewed in Epic    Gastroparesis: patient discontinued metoclopramide due to being on it for >3 months and risk of side effects. Patient saw [facility] and they recommended M[employer]gr[employer] IBS-C and to help with motilitiy but it is not covered by insurance. The pharmacy is pursuing     Mood/Anxiety/Insomnia:Current therapy includes clonazepam 1mg at bedtime (patient is taking this also for non-REM limb movement), duloxetine 60mg twice daily, lamictal 200mg daily, Patient is group therapy that meets 3 times a week that runs through February. Today was patient's 3rd session. Patient was needing more than just meeting with his therapist for an hour. Patient has found that this is helpful    Asthma: Current medications: ICS/LABA- Advair 1 puff(s) twice daily  Montelukast (Singulair) once daily  Short-Acting Bronchodilator: Albuterol MDI and Nebs. Patient rinses their mouth after using steroid inhaler. Triggers include: upper respiratory infections, pollens and cold air.  Patient reports the following symptoms: none.  Asthma Action Plan on file: due for updated AAP  Uses albuterol prior to putting on N95 mask  ACT Total Scores 12/12/2019 12/16/2019 5/18/2020   ACT TOTAL SCORE - - -   ASTHMA ER VISITS - - -   ASTHMA HOSPITALIZATIONS - - -   ACT TOTAL SCORE (Goal Greater than or Equal to 20) 19 20 20   In the past 12 months, how many times did you visit the emergency room for your asthma without  being admitted to the hospital? 0 0 0   In the past 12 months, how many times were you hospitalized overnight because of your asthma? 0 0 0     Type 2 Diabetes:  Pt currently taking glipizide XL 5mg daily. (metformin discontinued-[facility] thought may be causing diarrhea). No side effects.  SMBG: never.   Symptoms of low blood sugar? none  Symptoms of high blood sugar? none  Eye exam: up to date  Foot exam: up to date  Aspirin: Taking 81mg daily and denies side effects  Statin: Yes: Rosuvastatin   ACEi/ARB: Yes: Enalapril.   Urine Albumin:   Lab Results   Component Value Date    UMALCR 51.61 (H) 10/14/2020      Lab Results   Component Value Date    A1C 6.0 10/16/2020    A1C 6.1 08/18/2020    A1C 6.0 01/24/2020    A1C 5.9 09/13/2019    A1C 6.1 03/19/2019       ASSESSMENT:                             Current medications were reviewed today.     Medication Adherence: no issues identified    Gastroparesis: Waiting to see if Motegrity will be covered by insurance. Did discuss potential for suicidal ideation and worsening of depressions with motegrity.    Mood/Anxiety/Insomnia: Stable.    Diabetes: Patient is meeting A1c goal <7%. Patient may benefit from updated labs. Will discuss with Dr. Lyles.    Asthma: Patient would benefit from updated AAP and ACT.     PLAN:                          No medication recommendations made today.  ACT sent via [facility], updated AAP    I spent 30 minutes with this patient today.  All changes were made via collaborative practice agreement with Ksenia Lyles. A copy of the visit note was provided to the patient's primary care provider.    Will follow up in 8 weeks.    The patient was sent via [facility] a summary of these recommendations as an after visit summary.     Radha Mcdonald, Pharm.D, [facility]  Medication Therapy Management Pharmacist    Patient consented to a telehealth visit: yes  Telemedicine Visit Details  Type of service:  Telephone visit  Start Time: 2:35 PM  End Time:  3:00PM  Originating Location (patient location): Home  Distant Location (provider location):  [facility] [place]  Mode of Communication:  Telephone     Medication Therapy Recommendations Needing Review  No medication therapy recommendations to display

## 2020-12-08 NOTE — PROGRESS NOTES
Adult Day Treatment Program:  Individualized Treatment Plan     Date of Plan: 20    Name: Joel Pineda MRN: [id number]    : 1975    Programs:  Adult Day Treatment (ADT)     Clinical Track (if applicable):  6A    DSM5 Diagnosis  296.33 (F33.2) Major Depressive Disorder, Recurrent Episode, Severe _.  4. Other Diagnoses that is relevant to services:   300.00 (F41.9) Unspecified Anxiety Disorder  301.83 (F60.3) Borderline Personality Disorder.      Adult Day Treatment Program Multidisciplinary Team Members: Raza Leonard MD and/or Dr. Lorraine Patino, and/or Dr. Jaycob Boland MD;     Joel Pineda will participate in the Adult Day Treatment Program  3 days per week, 3 hours per day. Anticipated duration/discharge: 12 weeks    Due to COVID-19, services will be delivered via telemedicine until further notice.     Program Start Date: 20  Anticipated Discharge Date: 20(pending authorization/clinical changes)    NOTE: Complete CGI     Review Date: Does Joel Pineda continue to meet criteria to participate in the Adult Day Treatment Program, 3 days per week; 3 hours per day?   20 yes - Jaycob Boland MD   20 yes - Jaycob Boland MD   21 yes - Jaycob Boland MD   21 yes   21 yes - Jaycob Boland MD   3/5/12   no            Client Strengths:  caring, educated, empathetic, goal-focused, good listener, has a previous history of therapy, insightful, intelligent, motivated, open to learning, open to suggestions / feedback and supportive    Client Participation in Plan:  Contributed to goals and plan   Attended individual treatment plan meeting on 20  Agrees with plan   Received copy of treatment plan   Discussed with staff     Areas of Vulnerability:  Suicidal Ideation   Anxiety  Depressive symptoms     Long-Term Goals:  Knowledge about illness and management of symptoms   Maintenance of personal safety   Maintenance of sobriety   Effective management of impulsivity      Abuse Prevention Plan:  Safe, therapeutic environment   Safety coping plan as needed   Education regarding illness and skill development   Coordination with care providers   Impluse control education and intervention   Medication adjustment/management (MI/CD)   Monitor for use of substances    Discharge Criteria:  Satisfactory progress toward treatment goals   Improvement re: identified problems and symptoms   Ability to continue recovery at next level of service   Has a discharge plan in place   Has safety/coping plan in place   Complete coping cards   Regular attendance as scheduled     Areas of Treatment Focus        Area of Treatment Focus:   Personal Safety  Start Date:    12/8/20    Goal:  Target Date: 2/2/20; 2/19/21 Status: Completed  Client will notify staff when needing assistance to develop or implement a coping plan to manage suicidal or self injurious urges.  Client will use coping plan for safety, as needed.      Progress:   2/2/21: Tito has been experienced some passive suicidal ideation, especially on weekends.  He has been focusing on controlling what he can in the moment and distraction.   CONTINUE      3/5/21: Tito continued to expreience passive SI at times but was able to use his coping skills to get through it.  COMPLETED    Treatment Strategies:   Assess / reassess for appropriate therapy program involvement, encourage participation in therapies  Assess / reassess level of potential for harm to self or others  Engage in safety planning when indicated      Area of Treatment Focus:   Symptom Stabilization and Management  Start Date:    12/8/20    Goal:  Target Date: 2/2/20; 2/19/21 Status: Completed  Tito will work on increasing resilience in order to better manage symptoms.     Tito will work on creating an identify that isn't defined by his illnesses.      Tito will work on learning how to take action rather than freezing when his fight/flight/freeze system is activated.   "    Take medications as prescribed.        Progress:   2/2/21:  Tito has been working to increase resilience but continues to struggle.  He continues to work in creating an identify that isn't defined by his illness but it is an ongoing keane.  He has not had to encounter any situations that activated his flight/flight/freeze system so learning how to tack action continues to be a goal.  He has been taking his medications as prescribed.  CONTINUE    3/5/21: Tito continued to struggle with resilience and creating an identity for himself.  He has been taking medications as prescribed.  COMPLETED        Treatment Strategies:   Facilitate increased self awareness  Provide education regarding coping skills  Teach adaptive coping skills and communication skills      Area of Treatment Focus:   Cultural/ Racial / Health / Spiritual  Start Date:    12/8/20    Goal:  Target Date: 2/2/20; 2/19/21 Status: Active  Continue appointments with therapy and psychiatry.    Tito will use his support system when needed.    2/2/21: Tito will keep up with housework and chores.       Progress:   2/2/21: Tito stopped seeing one of his therapists and instead has started seeing a trauma therapist.  Tito has not been reaching out to his support system and instead been relying on group and his therapist.  CONTINUE    3/5/21: Tito contiued to see his psychiatrist but is on a waiting list for a new therapist.  He continues to struggle with reaching out for support.  COMPLETED        Treatment Strategies:   Assist clients in establishing / strengthening support network  Assist to identify treatment goals         Lenore French, [facility]      NOTE: Required signatures are completed manually and scanned into the electronic medical record. See \"Media\" tab in epic.    The Individualized Treatment Plan Signature Page has been routed to the provider for co-sign.    I have reviewed the patient's Individualized Treatment Plan and agree with " the current goals, interventions and level of care.     Jaycob Boland MD  12/9/2020

## 2020-12-08 NOTE — GROUP NOTE
Process Group Note    PATIENT'S NAME: Joel Pineda  MRN:   [id number]  :   1975  ACCT. NUMBER: [id number]  DATE OF SERVICE: 20  START TIME:  9:00 AM  END TIME:  9:50 AM  FACILITATOR: Lenore French [facility]  TOPIC:  Process Group    Diagnoses:  296.33 (F33.2) Major Depressive Disorder, Recurrent Episode, Severe _.  4. Other Diagnoses that is relevant to services:   300.00 (F41.9) Unspecified Anxiety Disorder  301.83 (F60.3) Borderline Personality Disorder.      [facility] Mental Health Day Treatment  TRACK: 1B    NUMBER OF PARTICIPANTS: 6    Telemedicine Visit: The patient's condition can be safely assessed and treated via synchronous audio and visual telemedicine encounter.      Reason for Telemedicine Visit: Services only offered telehealth    Originating Site (Patient Location): Patient's home    Distant Site (Provider Location): Provider Remote Setting    Consent:  The patient/guardian has verbally consented to: the potential risks and benefits of telemedicine (video visit) versus in person care; bill my insurance or make self-payment for services provided; and responsibility for payment of non-covered services.     Mode of Communication:  Video Conference via [employer]    As the provider I attest to compliance with applicable laws and regulations related to telemedicine.            Data:    Session content: At the start of this group, patients were invited to check in by identifying themselves, describing their current emotional status, and identifying issues to address in this group.   Area(s) of treatment focus addressed in this session included Symptom Management and Personal Safety.    Tito reported waking up feeling very anxious today.  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   Writer introduced him to the skill of DEAR MAN and encouraged him to look over that that skill before having a conversation with the  people in his NONA.  He took some times to talk about the psych testing he took several years ago and being unsure if the results are valid or if can trust them or not.  He was told he was psychotic but believes it was because he misunderstood the instructions of the Roschart test.  Writer and group members encouraged him to not put as much weight on the actual diagnosis as the symptoms.      Therapeutic Interventions/Treatment Strategies:  Psychotherapist offered support, feedback and validation and reinforced use of skills. Treatment modalities used include Motivational Interviewing, Cognitive Behavioral Therapy and Dialectical Behavioral Therapy. Interventions include Relationship Skills: Assisted patients in implementing more effective communication skills in their relationships.    Assessment:    Patient response:   Patient responded to session by accepting feedback, giving feedback and listening    Possible barriers to participation / learning include: and no barriers identified    Health Issues:   None reported       Substance Use Review:   Substance Use: Substance use has decreased    Mental Status/Behavioral Observations  Appearance:   Appropriate   Eye Contact:   Good   Psychomotor Behavior: Normal   Attitude:   Cooperative   Orientation:   All  Speech   Rate / Production: Normal    Volume:  Normal   Mood:    Anxious   Affect:    Appropriate   Thought Content:   Clear  Thought Form:  Coherent  Logical     Insight:    Good     Plan:     Safety Plan: No current safety concerns identified.  Recommended that patient call 911 or go to the local ED should there be a change in any of these risk factors.     Barriers to treatment: None identified    Patient Contracts (see media tab):  None    Substance Use: Not addressed in session     Continue or Discharge: Patient will continue in Adult Day Treatment (ADT)  as planned. Patient is likely to benefit from learning and using skills as they work toward the goals  identified in their treatment plan.      Lenore French, [facility]  December 8, 2020

## 2020-12-08 NOTE — GROUP NOTE
Psychoeducation Group Note    PATIENT'S NAME: Joel Pineda  MRN:   [id number]  :   1975  ACCT. NUMBER: [id number]  DATE OF SERVICE: 20  START TIME: 10:00 AM  END TIME: 10:50 AM  FACILITATOR: Lenore French LPCC  TOPIC: MH RN Group: Health Maintenance  [facility] Mental Health Day Treatment  TRACK: 6A    NUMBER OF PARTICIPANTS: 6    Telemedicine Visit: The patient's condition can be safely assessed and treated via synchronous audio and visual telemedicine encounter.      Reason for Telemedicine Visit: Services only offered telehealth    Originating Site (Patient Location): Patient's home    Distant Site (Provider Location): Provider Remote Setting    Consent:  The patient/guardian has verbally consented to: the potential risks and benefits of telemedicine (video visit) versus in person care; bill my insurance or make self-payment for services provided; and responsibility for payment of non-covered services.     Mode of Communication:  Video Conference via [employer]    As the provider I attest to compliance with applicable laws and regulations related to telemedicine.      Summary of Group / Topics Discussed:  Health Maintenance: Eight Dimensions of Wellness: The concept of holistic health through the model of eight dimensions was introduced. Group members participated in identifying behaviors and activities in each of the dimensions of wellness.  The importance of each dimension was reinforced and the concept of balance in life as it relates to wellness was explored.      Patient Session Goals / Objectives:    Verbalized understanding of balance in wellness and how it relates to their life    Identified and explained the eight dimensions of wellness    Categorized activities and wellness needs into corresponding dimensions appropriately during exercise          Patient Participation / Response:  Fully participated with the group by sharing personal reflections / insights and openly  received / provided feedback with other participants.    Demonstrated understanding of topics discussed through group discussion and participation, Identified / Expressed personal readiness to practice skills and Verbalized understanding of health maintenance topic    Treatment Plan:  Patient has a current master individualized treatment plan.  See Epic treatment plan for more information.    Lenore French, [facility]

## 2020-12-08 NOTE — Clinical Note
Hi Tito Adler has an upcoming appt with you on 12/21 and would like to have labs before he sees you. The last time he requested his glpizide and relafen he was only given a 30 day supply because he was due for labs. Do you want him to have an updated A1c, BMP and microalbumin before he sees you?    Radha Mcdonald, Pharm.D, BCACP  Medication Therapy Management Pharmacist

## 2020-12-09 ENCOUNTER — HOSPITAL ENCOUNTER (OUTPATIENT)
Dept: BEHAVIORAL HEALTH | Facility: CLINIC | Age: 45
End: 2020-12-09
Attending: PSYCHIATRY & NEUROLOGY
Payer: MEDICARE

## 2020-12-09 ENCOUNTER — TELEPHONE (OUTPATIENT)
Dept: BEHAVIORAL HEALTH | Facility: CLINIC | Age: 45
End: 2020-12-09

## 2020-12-09 ENCOUNTER — TELEPHONE (OUTPATIENT)
Dept: GASTROENTEROLOGY | Facility: CLINIC | Age: 45
End: 2020-12-09

## 2020-12-09 PROCEDURE — 90853 GROUP PSYCHOTHERAPY: CPT | Mod: GT | Performed by: COUNSELOR

## 2020-12-09 PROCEDURE — 90853 GROUP PSYCHOTHERAPY: CPT | Mod: PO | Performed by: SOCIAL WORKER

## 2020-12-09 NOTE — TELEPHONE ENCOUNTER
Writer called to conduct RN health screen. Left VM requesting call back. Will reattempt.  Sandy Huerta RN on 12/9/2020 at 2:09 PM

## 2020-12-09 NOTE — GROUP NOTE
Psychotherapy Group Note    PATIENT'S NAME: Joel Pindea  MRN:   [id number]  :   1975  ACCT. NUMBER: [id number]  DATE OF SERVICE: 20  START TIME: 11:00 AM  END TIME: 11:50 AM  FACILITATOR: Morenita Lau LPCC  TOPIC: MH EBP Group: Coping Skills  [facility] Adult Mental Health Day Treatment  TRACK: 6A    NUMBER OF PARTICIPANTS: 7    Summary of Group / Topics Discussed:  Coping Skills: Building Positive Experiences: Patients discussed the importance of planning and engaging in positive experiences, as strategies to increase positive thinking, hope, and self-worth.  Explored the benefits of planning / creating positive experiences, including recognizing and reducing negativity bias by focusing on and building positive experiences.   Several approaches to building positive experiences were presented and discussed relevant to each patient.      Patient Session Goals / Objectives:    Understand the purpose of planning / creating / participating / sharing in positive experiences.    Explore patient s experiences related to negative thinking and how it influences activities and moodIdentify current positive events in patient s life.     Set goals to increase a variety of positive experiences.    Address barriers to planning / engaging in positive experiences.    Telemedicine Visit: The patient's condition can be safely assessed and treated via synchronous audio and visual telemedicine encounter.      Reason for Telemedicine Visit: Services only offered telehealth and due to COVID-19    Originating Site (Patient Location): Patient's home    Distant Site (Provider Location): Provider Remote Setting    Consent:  The patient/guardian has verbally consented to: the potential risks and benefits of telemedicine (video visit) versus in person care; bill my insurance or make self-payment for services provided; and responsibility for payment of non-covered services.     Mode of Communication:  Video  Conference via [employer]    As the provider I attest to compliance with applicable laws and regulations related to telemedicine.          Patient Participation / Response:  Fully participated with the group by sharing personal reflections / insights and openly received / provided feedback with other participants.    Demonstrated understanding of topics discussed through group discussion and participation, Expressed understanding of the relevance / importance of coping skills at distressing times in life and Demonstrated knowledge of when to consider using a variety of coping skills in daily life    Treatment Plan:  Patient has a current master individualized treatment plan.  See Epic treatment plan for more information.    Morenita Lau, [facility]

## 2020-12-09 NOTE — GROUP NOTE
Process Group Note    PATIENT'S NAME: Joel Pineda  MRN:   [id number]  :   1975  ACCT. NUMBER: [id number]  DATE OF SERVICE: 20  START TIME: 10:00 AM  END TIME: 10:50 AM  FACILITATOR: Magali Dodson LICSW  TOPIC:  Process Group    Diagnoses:  296.33 (F33.2) Major Depressive Disorder, Recurrent Episode, Severe _.  4. Other Diagnoses that is relevant to services:   300.00 (F41.9) Unspecified Anxiety Disorder  301.83 (F60.3) Borderline Personality Disorder.      [facility] Mental Health Day Treatment  TRACK: 6A    NUMBER OF PARTICIPANTS: 6    Telemedicine Visit: The patient's condition can be safely assessed and treated via synchronous audio and visual telemedicine encounter.      Reason for Telemedicine Visit: Services only offered telehealth    Originating Site (Patient Location): Patient's home    Distant Site (Provider Location): Provider Remote Setting    Consent:  The patient/guardian has verbally consented to: the potential risks and benefits of telemedicine (video visit) versus in person care; bill my insurance or make self-payment for services provided; and responsibility for payment of non-covered services.     Mode of Communication:  Video Conference via [employer]    As the provider I attest to compliance with applicable laws and regulations related to telemedicine.           Data:    Session content: At the start of this group, patients were invited to check in by identifying themselves, describing their current emotional status, and identifying issues to address in this group.   Area(s) of treatment focus addressed in this session included Symptom Management, Personal Safety and Community Resources/Discharge Planning.  Tito reported stressor of dealing with COVID 19 and the changes to family gatherings.  He stated that he was pleased his relative found a holiday gift giving james that reduced planning.  He stated that he is having poor memory and he is not sure what  is contributing to that.  He stated that he has had neuropsychological testing to evaluate the memory.  Client denied suicidal ideation, intent and plan.     Therapeutic Interventions/Treatment Strategies:  Psychotherapist offered support, feedback and validation and reinforced use of skills. Treatment modalities used include Cognitive Behavioral Therapy. Interventions include Coping Skills: Assisted patient in identifying 1-2 healthy distraction skills to reduce overall distress.    Assessment:    Patient response:   Patient responded to session by listening and being attentive    Possible barriers to participation / learning include: and no barriers identified    Health Issues:   None reported       Substance Use Review:   Substance Use: No active concerns identified.    Mental Status/Behavioral Observations  Appearance:   Appropriate   Eye Contact:   Good   Psychomotor Behavior: Normal   Attitude:   Cooperative   Orientation:   All  Speech   Rate / Production: Normal    Volume:  Normal   Mood:    Anxious  Depressed   Affect:    Appropriate   Thought Content:   Clear  Thought Form:  Coherent  Logical     Insight:    Good     Plan:     Safety Plan: No current safety concerns identified.  Recommended that patient call 911 or go to the local ED should there be a change in any of these risk factors.     Barriers to treatment: None identified    Patient Contracts (see media tab):  None    Substance Use: Not addressed in session     Continue or Discharge: Patient will continue in Adult Day Treatment (ADT)  as planned. Patient is likely to benefit from learning and using skills as they work toward the goals identified in their treatment plan.      Magali Dodson, [facility]  December 9, 2020

## 2020-12-09 NOTE — TELEPHONE ENCOUNTER
"Clinic received a notice from [employer] stating \"Motegrity 2 mg tablets is not covered by patients insurance. Covered formulary alternatives may include: Amitiza 24 mcg Cap, Movantik 25 mg Tab, Linzess 145 mcg Cap, Trulance.\" Message sent to provider to advise if PA should be completed on Motegrity or if an alternative should be prescribed.    Ksenia Gil, NIRAJ    "

## 2020-12-09 NOTE — TELEPHONE ENCOUNTER
"RN Review of Medical History / Physical Health Screen  Outpatient Mental Health Programs - [facility] Adult Mental Health Day Treatment    PATIENT'S NAME: Joel Pineda  MRN:   [id number]  :   1975  ACCT. NUMBER: [id number]  CURRENT AGE:  45 year old    DATE OF DIAGNOSTIC ASSESSMENT: 20  DATE OF ADMISSION: 20     Please see Diagnostic Assessment for additional Medical History.     General Health:   Have you had any exposure to any communicable disease in the past 2-3 weeks? no     Are you aware of safe sex practices? yes       Nutrition:    Are you on a special diet? If yes, please explain:  yes has gastroparesis, mornings are harder, feeling nauseated (Sees gastro)   Do you have any concerns regarding your nutritional status? If yes, please explain:  yes Meds for gastroparesis, see above   Have you had any appetite changes in the last 3 months?  Yes, emotional eating tendencies, see above     Have you had any weight loss or weight gain in the last 3 months?  No     Do you have a history of an eating disorder? no   Do you have a history of being in an eating disorder program? no     Patient height and weight recorded by RN in epic flowsheet: no No; Unable to measure  Because of temporary in-person programmatic suspension due to COVID-19 pandemic, all pt weights and heights will be collected through patient self-report an recorded in physical health screening progress note upon admission to the program.                            Height/Weight Review:  Patient reported height:     6'5\"   Patient reports weight:  Date last checked:  350 pounds   Any referrals/needs identified?                BMI Review:  Was the patient informed of BMI? no      Findings See above         Fall Risk:   Have you had any falls in the past 3 months? yes r/t sleep behavior d/o, had sleep study, acts out, falls out of bed at times (lowered bed, switched to Klonopin, put up guard rails)     Do you currently " use any assistive devices for mobility?   no      Additional Comments/Assessment: Has POTS, experiences orthostatic BP; on Lyrica for pain mgmt    Per completion of the Medical History / Physical Health Screen, is there a recommendation to see / follow up with a primary care physician/clinic or dentist?    No.      Sandy Huerta RN  12/9/2020         Unemployed

## 2020-12-09 NOTE — GROUP NOTE
Psychotherapy Group Note    PATIENT'S NAME: Joel Pineda  MRN:   [id number]  :   1975  ACCT. NUMBER: [id number]  DATE OF SERVICE: 20  START TIME:  9:00 AM  END TIME:  9:50 AM  FACILITATOR: Morenita Lau LPCC  TOPIC: MH EBP Group: Self-Awareness  [facility] Adult Mental Health Day Treatment  TRACK: 6A    NUMBER OF PARTICIPANTS: 6    Summary of Group / Topics Discussed:  Self-Awareness: Gratitude: Topic focused on assisting patients in identifying key concepts in gratitude. Patients discussed the benefits of practicing gratitude and its impact on mood improvement, mindfulness, and perspective. Patients worked to increase time spent on recognition and appreciation of what is positive and working in their lives. Patients discussed the concepts and benefits of feeling grateful. The goal is to reduce rumination and negative thinking resulting in increased mindfulness and resilience. Patients specifically discussed how they can practice and problem solve barriers to daily gratitude practice.     Patient Session Goals / Objectives:    [place]inton the concept and benefits of gratitude    Identified ways to practice gratitude in daily life    Problem solved barriers to practicing gratitude    Telemedicine Visit: The patient's condition can be safely assessed and treated via synchronous audio and visual telemedicine encounter.      Reason for Telemedicine Visit: Services only offered telehealth and due to COVID-19    Originating Site (Patient Location): Patient's home    Distant Site (Provider Location): Provider Remote Setting    Consent:  The patient/guardian has verbally consented to: the potential risks and benefits of telemedicine (video visit) versus in person care; bill my insurance or make self-payment for services provided; and responsibility for payment of non-covered services.     Mode of Communication:  Video Conference via [employer]    As the provider I attest to compliance with applicable  laws and regulations related to telemedicine.        Patient Participation / Response:  Fully participated with the group by sharing personal reflections / insights and openly received / provided feedback with other participants.    Identified / Expressed readiness to act intentionally, increase self-compassion, promote personal growth    Treatment Plan:  Patient has a current master individualized treatment plan.  See Epic treatment plan for more information.    Morenita Lau, [facility]

## 2020-12-10 ENCOUNTER — TELEPHONE (OUTPATIENT)
Dept: GASTROENTEROLOGY | Facility: CLINIC | Age: 45
End: 2020-12-10

## 2020-12-10 NOTE — TELEPHONE ENCOUNTER
Please let patient know that motegrity is not covered by his insurance. Would he like to try an alternative medication for his constipation or would he like to continue with the miralax daily.     Addendum: patient sent a [employer] message with questions regarding symptoms and motegrity. Therefore will address motegrity through [employer]     Sofía Wiggins PA-C  Gastroenterology  [facility]

## 2020-12-10 NOTE — TELEPHONE ENCOUNTER
Prior Authorization Retail Medication Request    Medication/Dose: Motegrity 2 mg  ICD code (if different than what is on RX):    Previously Tried and Failed:    Rationale:      Insurance Name:    Insurance ID:        Pharmacy Information (if different than what is on RX)  Name:    Phone:      Ksenia Gil LPN

## 2020-12-10 NOTE — TELEPHONE ENCOUNTER
LPN responded to patients [employer] message asking if he would like to try an alternative medication or continue with miralax. Waiting to hear back.     Ksenia Gil LPN

## 2020-12-10 NOTE — TELEPHONE ENCOUNTER
Prior authorization started for Motegrity 2 mg. See telephone encounter from 12/10/20.    Ksenia Gil LPN

## 2020-12-10 NOTE — TELEPHONE ENCOUNTER
Central Prior Authorization Team   Phone: [phone]      PA Initiation    Medication: Prucalopride Succinate (MOTEGRITY) 2 MG TABS  Insurance Company: [employer] - Phone [phone] Fax [phone]  Pharmacy Filling the Rx: [id number] - [place], [place] - [address]  Filling Pharmacy Phone: [phone]  Filling Pharmacy Fax:    Start Date: 12/10/2020

## 2020-12-11 ENCOUNTER — HOSPITAL ENCOUNTER (OUTPATIENT)
Dept: BEHAVIORAL HEALTH | Facility: CLINIC | Age: 45
End: 2020-12-11
Attending: PSYCHIATRY & NEUROLOGY
Payer: MEDICARE

## 2020-12-11 ENCOUNTER — MYC MEDICAL ADVICE (OUTPATIENT)
Dept: PODIATRY | Facility: CLINIC | Age: 45
End: 2020-12-11

## 2020-12-11 ENCOUNTER — MYC MEDICAL ADVICE (OUTPATIENT)
Dept: EDUCATION SERVICES | Facility: CLINIC | Age: 45
End: 2020-12-11

## 2020-12-11 ENCOUNTER — TELEPHONE (OUTPATIENT)
Dept: FAMILY MEDICINE | Facility: CLINIC | Age: 45
End: 2020-12-11

## 2020-12-11 DIAGNOSIS — M77.8 CAPSULITIS OF FOOT, RIGHT: Primary | ICD-10-CM

## 2020-12-11 DIAGNOSIS — E11.8 TYPE 2 DIABETES MELLITUS WITH COMPLICATION, WITHOUT LONG-TERM CURRENT USE OF INSULIN (H): Primary | ICD-10-CM

## 2020-12-11 PROCEDURE — 90853 GROUP PSYCHOTHERAPY: CPT | Mod: GT | Performed by: COUNSELOR

## 2020-12-11 ASSESSMENT — COLUMBIA-SUICIDE SEVERITY RATING SCALE - C-SSRS
1. IN YOUR LIFETIME, HAVE YOU WISHED YOU WERE DEAD OR WISHED YOU COULD GO TO SLEEP AND NOT WAKE UP?: YES
1. IN THE PAST MONTH, HAVE YOU WISHED YOU WERE DEAD OR WISHED YOU COULD GO TO SLEEP AND NOT WAKE UP?: YES
1. IN THE PAST MONTH, HAVE YOU WISHED YOU WERE DEAD OR WISHED YOU COULD GO TO SLEEP AND NOT WAKE UP?: NO

## 2020-12-11 NOTE — TELEPHONE ENCOUNTER
Reason for call:  Order   Order or referral being requested: diabetes education  Reason for request: has an appt with CDE on 12/30  Date needed: as soon as possible  Has the patient been seen by the PCP for this problem? YES    Additional comments: Patient is scheduled with CDE on 12/30. No current referral/order in place. Referral is required prior to his visit, otherwise we will have to cancel.     Phone number to reach patient:  Cell number on file:    Telephone Information:   Mobile [phone]       Best Time:  NA    Can we leave a detailed message on this number?  Not Applicable    Travel screening: Not Applicable     Keyana ANTHONY  Central Scheduler

## 2020-12-11 NOTE — GROUP NOTE
Psychotherapy Group Note    PATIENT'S NAME: Joel Pineda  MRN:   [id number]  :   1975  ACCT. NUMBER: [id number]  DATE OF SERVICE: 20  START TIME: 11:00 AM  END TIME: 11:50 AM  FACILITATOR: Morenita Lau LPCC  TOPIC: MH EBP Group: Coping Skills  [facility] Adult Mental Health Day Treatment  TRACK: 6A    NUMBER OF PARTICIPANTS: 5    Summary of Group / Topics Discussed:  Coping Skills: Additional Coping Skills:  Patients discussed and practiced positive affirmations.  Reviewed the benefits of applying the aforementioned coping strategies.  Patients explored how these strategies might be applied to daily stressors or distressing situations.    Patient Session Goals / Objectives:    Understand the purpose and benefits of applying affirmations coping strategies    Identified affirmations to utilize daily    Address barriers to utilizing coping skills when in distress.    Telemedicine Visit: The patient's condition can be safely assessed and treated via synchronous audio and visual telemedicine encounter.      Reason for Telemedicine Visit: Services only offered telehealth and due to COVID-19    Originating Site (Patient Location): Patient's home    Distant Site (Provider Location): Provider Remote Setting    Consent:  The patient/guardian has verbally consented to: the potential risks and benefits of telemedicine (video visit) versus in person care; bill my insurance or make self-payment for services provided; and responsibility for payment of non-covered services.     Mode of Communication:  Video Conference via [employer]    As the provider I attest to compliance with applicable laws and regulations related to telemedicine.          Patient Participation / Response:  Fully participated with the group by sharing personal reflections / insights and openly received / provided feedback with other participants.    Demonstrated understanding of topics discussed through group discussion and  participation, Expressed understanding of the relevance / importance of coping skills at distressing times in life and Demonstrated knowledge of when to consider using a variety of coping skills in daily life    Treatment Plan:  Patient has a current master individualized treatment plan.  See Epic treatment plan for more information.    Morenita Lau, [facility]

## 2020-12-11 NOTE — GROUP NOTE
Process Group Note    PATIENT'S NAME: Joel Pineda  MRN:   [id number]  :   1975  ACCT. NUMBER: [id number]  DATE OF SERVICE: 20  START TIME:  9:00 AM  END TIME:  9:50 AM  FACILITATOR: Lenore French [facility]  TOPIC:  Process Group    Diagnoses:  296.33 (F33.2) Major Depressive Disorder, Recurrent Episode, Severe _.  4. Other Diagnoses that is relevant to services:   300.00 (F41.9) Unspecified Anxiety Disorder  301.83 (F60.3) Borderline Personality Disorder.      [facility] Day Treatment  TRACK: 6A    NUMBER OF PARTICIPANTS: 5    Telemedicine Visit: The patient's condition can be safely assessed and treated via synchronous audio and visual telemedicine encounter.      Reason for Telemedicine Visit: Services only offered telehealth    Originating Site (Patient Location): Patient's home    Distant Site (Provider Location): Provider Remote Setting    Consent:  The patient/guardian has verbally consented to: the potential risks and benefits of telemedicine (video visit) versus in person care; bill my insurance or make self-payment for services provided; and responsibility for payment of non-covered services.     Mode of Communication:  Video Conference via [employer]    As the provider I attest to compliance with applicable laws and regulations related to telemedicine.            Data:    Session content: At the start of this group, patients were invited to check in by identifying themselves, describing their current emotional status, and identifying issues to address in this group.   Area(s) of treatment focus addressed in this session included Symptom Management and Personal Safety.    Tito reported feeling frustrated because he woke up with a headache.  He was unable to identify a goal for the day.  He took some process time to discuss the challenges of his parents aging.  He identified that he is fearful of their further decline and is having trouble accepting that they are  not going to improve.  Writer and group discussed the importance of radical acceptance and appreciating them now instead of living in the future.      Therapeutic Interventions/Treatment Strategies:  Psychotherapist offered support, feedback and validation and reinforced use of skills. Treatment modalities used include Motivational Interviewing, Cognitive Behavioral Therapy and Dialectical Behavioral Therapy. Interventions include Coping Skills: Facilitated discussion on learning and applying radical acceptance skill.    Assessment:    Patient response:   Patient responded to session by accepting feedback, giving feedback and listening    Possible barriers to participation / learning include: and no barriers identified    Health Issues:   Yes: Pain, Associated Psychological Distress       Substance Use Review:   Substance Use: No active concerns identified.    Mental Status/Behavioral Observations  Appearance:   Appropriate   Eye Contact:   Good   Psychomotor Behavior: Normal   Attitude:   Cooperative   Orientation:   All  Speech   Rate / Production: Normal    Volume:  Normal   Mood:    Depressed   Affect:    Appropriate   Thought Content:   Clear  Thought Form:  Coherent  Logical     Insight:    Good     Plan:     Safety Plan: No current safety concerns identified.  Recommended that patient call 911 or go to the local ED should there be a change in any of these risk factors.     Barriers to treatment: None identified    Patient Contracts (see media tab):  None    Substance Use: Not addressed in session     Continue or Discharge: Patient will continue in Adult Day Treatment (ADT)  as planned. Patient is likely to benefit from learning and using skills as they work toward the goals identified in their treatment plan.      Lenore French, [facility]  December 11, 2020

## 2020-12-11 NOTE — GROUP NOTE
Psychotherapy Group Note    PATIENT'S NAME: Joel Pineda  MRN:   [id number]  :   1975  ACCT. NUMBER: [id number]  DATE OF SERVICE: 20  START TIME: 10:00 AM  END TIME: 10:50 AM  FACILITATOR: Morenita Lau LPCC  TOPIC: MH EBP Group: Relationship Skills  [facility] Adult Mental Health Day Treatment  TRACK: 6A    NUMBER OF PARTICIPANTS: 5    Summary of Group / Topics Discussed:  Relationship Skills: Relationship Mapping: Patients identified different types of relationships they have in their life and examined if there is conflict or closeness, the degree of conflict or closeness, and the reason for conflict. The goal of this topic is to help patients gain awareness of the relationships they have with others, identify types of conflict in patients  lives and how they impact symptoms/functioning, and identify how they can improve relationships with relationship and interpersonal skills they have learned.     Patient Session Goals / Objectives:    Familiarized patients with awareness to the different types of relationships they may have with different people and substances in their lives    Discussed and practiced strategies to promote healthier understanding of interpersonal relationships with a focus on awareness of conflict, the causes of conflict, and the ways to transform conflict    Discussed the use of substances and its impact on their relationships    Telemedicine Visit: The patient's condition can be safely assessed and treated via synchronous audio and visual telemedicine encounter.      Reason for Telemedicine Visit: Services only offered telehealth and due to COVID-19    Originating Site (Patient Location): Patient's home    Distant Site (Provider Location): Provider Remote Setting    Consent:  The patient/guardian has verbally consented to: the potential risks and benefits of telemedicine (video visit) versus in person care; bill my insurance or make self-payment for services  provided; and responsibility for payment of non-covered services.     Mode of Communication:  Video Conference via Snapt[employer]    As the provider I attest to compliance with applicable laws and regulations related to telemedicine.        Patient Participation / Response:  Fully participated with the group by sharing personal reflections / insights and openly received / provided feedback with other participants.    Demonstrated understanding of topics discussed through group discussion and participation, Demonstrated understanding of relationship skills and communication skills and Identified / Expressed personal readiness to incorporate effective communication skills    Treatment Plan:  Patient has a current master individualized treatment plan.  See Epic treatment plan for more information.    Morenita Lau, [facility]

## 2020-12-14 ENCOUNTER — TELEPHONE (OUTPATIENT)
Dept: SLEEP MEDICINE | Facility: CLINIC | Age: 45
End: 2020-12-14

## 2020-12-14 DIAGNOSIS — G47.33 OSA (OBSTRUCTIVE SLEEP APNEA): ICD-10-CM

## 2020-12-14 NOTE — TELEPHONE ENCOUNTER
PRIOR AUTHORIZATION DENIED    Medication: Prucalopride Succinate (MOTEGRITY) 2 MG TABS- DENIED    Denial Date: 12/12/2020    Denial Rational:           Appeal Information:

## 2020-12-14 NOTE — PROGRESS NOTES
Patient Active Problem List   Diagnosis     Major depressive disorder, recurrent episode (H)     Intermittent asthma     Hyperlipidemia LDL goal <100     Chronic nonallergic rhinitis     Diverticulosis     GERD (gastroesophageal reflux disease)     Anxiety     LLOYD (obstructive sleep apnea)- mild (AHI 11)     Intracranial arachnoid cyst     Facet arthritis of cervical region     Acquired hypothyroidism     Bipolar 2 disorder (H)     Chronic midline low back pain without sciatica     Irritable bowel syndrome with diarrhea     B12 deficiency     Essential hypertension with goal blood pressure less than 140/90     Chronic pain syndrome     Ankylosing spondylitis of sacral region (H)     Morbid obesity due to hypertriglyceridemia (H)     Fatty infiltration of liver     DDD (degenerative disc disease), lumbar     Type 2 diabetes mellitus with complication, without long-term current use of insulin (H)     Peripheral polyneuropathy     History of pulmonary embolism     Ingrown toenail     Hypertriglyceridemia     Gastroparesis     Orthostatic dizziness     POTS (postural orthostatic tachycardia syndrome)     PHN (postherpetic neuralgia)     Dysautonomia (H)     Major depressive disorder, recurrent episode, severe (H)       Current Outpatient Medications:      acetaminophen 500 MG CAPS, Take 500 mg by mouth every 4 hours as needed, Disp: 60 capsule, Rfl:      albuterol (PROAIR HFA/PROVENTIL HFA/VENTOLIN HFA) 108 (90 Base) MCG/ACT inhaler, Inhale 2 puffs into the lungs every 4 hours as needed for shortness of breath / dyspnea or wheezing, Disp: 8.5 g, Rfl: 11     albuterol (PROVENTIL) (2.5 MG/3ML) 0.083% neb solution, Take 1 vial (2.5 mg) by nebulization every 6 hours as needed for shortness of breath / dyspnea or wheezing, Disp: 200 mL, Rfl: 3     aspirin (ASA) 81 MG tablet, Take 81 mg by mouth daily , Disp: , Rfl:      cetirizine (ZYRTEC) 10 MG tablet, Take 1 tablet (10 mg) by mouth At Bedtime, Disp: 30 tablet, Rfl: 11      cholecalciferol (VITAMIN D3) 78739 units (1250 mcg) capsule, Take one capsule every two weeks., Disp: 24 capsule, Rfl: 1     clonazePAM (KLONOPIN) 0.5 MG tablet, Take 2 tablets by mouth At Bedtime, Disp: , Rfl:      cyanocobalamin (CYANOCOBALAMIN) 1000 MCG/ML injection, Inject 1 mL (1,000 mcg) into the muscle every 30 days, Disp: 10 mL, Rfl: 0     diltiazem 2% in PLO gel, To anal opening three times daily.  Use a pea-sized amount.  Store at room temperature. (Patient not taking: Reported on 12/7/2020), Disp: 60 g, Rfl: 0     DULoxetine (CYMBALTA) 60 MG capsule, Take 60 mg by mouth 2 times daily , Disp: , Rfl:      EPINEPHrine (EPIPEN/ADRENACLICK/OR ANY BX GENERIC EQUIV) 0.3 MG/0.3ML injection 2-pack, Inject 0.3 mLs (0.3 mg) into the muscle once as needed for anaphylaxis, Disp: 0.6 mL, Rfl: 3     etanercept (ENBREL SURECLICK) 50 MG/ML autoinjector, Inject 50 mg Subcutaneous once a week Hold for signs of infection, and seek medical attention., Disp: 4 mL, Rfl: 6     famotidine (PEPCID) 20 MG tablet, Prior to administration of Humira every 2 weeks (Patient taking differently: Take 20 mg by mouth every 7 days Prior to Enbrel Injections to prevent skin reaction), Disp: , Rfl:      fluticasone (FLONASE) 50 MCG/ACT nasal spray, Spray 1 spray into both nostrils daily, Disp: , Rfl:      fluticasone-salmeterol (ADVAIR) 500-50 MCG/DOSE inhaler, Inhale 1 puff into the lungs every 12 hours, Disp: 3 Inhaler, Rfl: 3     glipiZIDE (GLUCOTROL XL) 5 MG 24 hr tablet, Take 1 tablet (5 mg) by mouth daily ++NO FURTHER REFILLS without FOLLOW UP+++, Disp: 30 tablet, Rfl: 0     lamoTRIgine (LAMICTAL) 100 MG tablet, Take 200 mg by mouth daily, Disp: , Rfl:      levothyroxine (SYNTHROID/LEVOTHROID) 75 MCG tablet, TAKE 1 TABLET EVERY MORNING, Disp: 90 tablet, Rfl: 0     lidocaine (XYLOCAINE) 5 % external ointment, Apply topically 4 times daily as needed (pain), Disp: 50 g, Rfl: 2     metoprolol succinate ER (TOPROL-XL) 200 MG 24 hr tablet,  Take 1 tablet (200 mg) by mouth 2 times daily, Disp: 180 tablet, Rfl: 0     montelukast (SINGULAIR) 10 MG tablet, Take 1 tablet (10 mg) by mouth every evening, Disp: 90 tablet, Rfl: 1     nabumetone (RELAFEN) 500 MG tablet, Take 1-2 tablets (500-1,000 mg) by mouth 2 times daily (with meals) as needed for back/joint pain. ++NO FURTHER REFILLS without FOLLOW UP and labs+++, Disp: 60 tablet, Rfl: 0     naloxone (NARCAN) 4 MG/0.1ML nasal spray, Spray 1 spray (4 mg) into one nostril alternating nostrils as needed for opioid reversal every 2-3 minutes until assistance arrives, Disp: 0.2 mL, Rfl: 0     omega-3 acid ethyl esters (LOVAZA) 1 g capsule, TAKE 2 CAPSULES BY MOUTH TWICE DAILY., Disp: 360 capsule, Rfl: 1     omeprazole 20 MG tablet, Take 20 mg by mouth daily , Disp: , Rfl:      ondansetron (ZOFRAN-ODT) 8 MG ODT tab, Take 1 tablet (8 mg) by mouth every 8 hours as needed for nausea, Disp: 30 tablet, Rfl: 3     order for DME, Equipment being ordered: Assure Compression stockings (ANNETTA) Large, closed toe, thigh-high 30-40 compression, Disp: 2 Units, Rfl: 3     order for DME, Equipment being ordered: lumbosacral belt/brace, Disp: 1 Units, Rfl: 0     order for DME, Respironics REMSTAR 60 Series Auto CPAP 9-13 cm H2O, Wisp nasal mask w/a large cushion and a chinstrap, Disp: , Rfl:      oxyCODONE (ROXICODONE) 5 MG tablet, Take 1-2 tablets (5-10 mg) by mouth every 6 hours as needed for pain (maximum 4 tablet(s) per day), Disp: 35 tablet, Rfl: 0     polyethylene glycol (MIRALAX) 17 g packet, Take 1 packet by mouth daily, Disp: , Rfl:      pregabalin (LYRICA) 150 MG capsule, Take 1 capsule (150 mg) by mouth 3 times daily (Patient taking differently: Take 150 mg by mouth 2 times daily 1 tablet in the AM and 2 tablets in the evening), Disp: 270 capsule, Rfl: 1     Prucalopride Succinate (MOTEGRITY) 2 MG TABS, Take 2 mg by mouth daily, Disp: 30 tablet, Rfl: 0     QUEtiapine (SEROQUEL) 25 MG tablet, Taking 75 MG @ HS per pt  report, Disp: , Rfl:      ramipril (ALTACE) 10 MG capsule, Take 1 capsule (10 mg) by mouth daily, Disp: 90 capsule, Rfl: 1     Rectal Protectant-Emollient (CALMOL-4) 76-10 % SUPP, Place 1 suppository rectally 3 times daily as needed , Disp: , Rfl:      rizatriptan (MAXALT-MLT) 5 MG ODT, Take 1 tablet (5 mg) by mouth at onset of headache for migraine, Disp: 30 tablet, Rfl: 5     rosuvastatin (CRESTOR) 40 MG tablet, Take 1 tablet (40 mg) by mouth daily, Disp: 90 tablet, Rfl: 0     syringe, disposable, (BD TUBERCULIN SYRINGE) 1 ML MISC, Equipment being ordered: 1 ml tuberculin syringes to be used for Vitamin B12 injections., Disp: 12 each, Rfl: 11     vitamin B complex with vitamin C (STRESS TAB) tablet, Take 1 tablet by mouth daily, Disp: , Rfl:      ZINC SULFATE-VITAMIN C MT, Take 1 tablet by mouth daily, Disp: , Rfl:   Psychiatry staffing: case discussed  Diagnosis:    As above, probably MDD and BPD.  Doing well.

## 2020-12-14 NOTE — TELEPHONE ENCOUNTER
Pt called on 12/11/20 at 1:03 pm l/m and said he's using he machine. Everything is going good but would like his starting pressure to start at 9 instead of 5. His old machine starts at 9 and it's better for him. Starting at 5 makes him feel congested.    I called patient back today 12/14/20 at 8:26 am. No answer, left message letting him know the Dr put in starting pressures for the new machine as 5-15 and if he wants the pressure to be change he will have to reach out to the Dr's office and have the Dr put in an order to change his starting pressures.

## 2020-12-15 ENCOUNTER — HOSPITAL ENCOUNTER (OUTPATIENT)
Dept: BEHAVIORAL HEALTH | Facility: CLINIC | Age: 45
End: 2020-12-15
Attending: PSYCHIATRY & NEUROLOGY
Payer: MEDICARE

## 2020-12-15 ENCOUNTER — PATIENT OUTREACH (OUTPATIENT)
Dept: CARE COORDINATION | Facility: CLINIC | Age: 45
End: 2020-12-15

## 2020-12-15 ENCOUNTER — PATIENT OUTREACH (OUTPATIENT)
Dept: NURSING | Facility: CLINIC | Age: 45
End: 2020-12-15
Payer: MEDICARE

## 2020-12-15 DIAGNOSIS — R07.9 CHEST PAIN: Primary | ICD-10-CM

## 2020-12-15 DIAGNOSIS — R05.9 COUGH: ICD-10-CM

## 2020-12-15 PROCEDURE — 90853 GROUP PSYCHOTHERAPY: CPT | Mod: GT | Performed by: COUNSELOR

## 2020-12-15 NOTE — PROGRESS NOTES
Clinic Care Coordination Contact  Community Health Worker Initial Outreach            Patient accepts CC: No, Patient declined CCC. He has question for PCP. CHW will send message to PCP.. Patient will be sent Care Coordination introduction letter for future reference.     Geovanna AGUILAR Community Health Worker  Clinic Care Coordination  [facility] : [facility] & [facility]  Phone: [phone]    Reason for Referral: Care Transition: ED to outpatient  Additional pertinent details: 12/14/20 [facility]

## 2020-12-15 NOTE — GROUP NOTE
Psychotherapy Group Note    PATIENT'S NAME: Joel Pineda  MRN:   [id number]  :   1975  ACCT. NUMBER: [id number]  DATE OF SERVICE: 12/15/20  START TIME: 11:00 AM  END TIME: 11:50 AM  FACILITATOR: Morenita Lau LPCC  TOPIC: MH EBP Group: Coping Skills  [facility] Adult Mental Health Day Treatment  TRACK: 6A    NUMBER OF PARTICIPANTS: 4    Summary of Group / Topics Discussed:  Coping Skills: Meditation: Patients learned about meditation and explored how and when to utilize it to increase focus, reduce mental health symptoms, decrease physical tension, and improve mental well-being.  Approaches to meditation were presented as a means of increasing self-awareness. The benefits of various meditation practices were discussed, as well as barriers to utilization of this coping strategy.     Patient Session Goals / Objectives:    Understand the purpose and efficacy of using meditation modalities to reduce stress / symptoms.    Review / discuss situations in daily life that cause distress, where establishing a meditation routine or meditating as needed may improve functioning.      Verbalize understanding of how and when to apply grounding strategies to reduce distress and increase presence in the moment.    Practice meditation and address barriers to use in daily life.    Telemedicine Visit: The patient's condition can be safely assessed and treated via synchronous audio and visual telemedicine encounter.      Reason for Telemedicine Visit: Services only offered telehealth and due to COVID-19    Originating Site (Patient Location): Patient's home    Distant Site (Provider Location): Provider Remote Setting    Consent:  The patient/guardian has verbally consented to: the potential risks and benefits of telemedicine (video visit) versus in person care; bill my insurance or make self-payment for services provided; and responsibility for payment of non-covered services.     Mode of Communication:  Video  Conference via [employer]    As the provider I attest to compliance with applicable laws and regulations related to telemedicine.          Patient Participation / Response:  Fully participated with the group by sharing personal reflections / insights and openly received / provided feedback with other participants.    Demonstrated understanding of topics discussed through group discussion and participation, Expressed understanding of the relevance / importance of coping skills at distressing times in life and Demonstrated knowledge of when to consider using a variety of coping skills in daily life    Treatment Plan:  Patient has a current master individualized treatment plan.  See Epic treatment plan for more information.    Morenita Lau, [facility]

## 2020-12-15 NOTE — LETTER
[place] CARE COORDINATION  [address] [place] [address]    December 15, 2020    Joel Pineda  [address] [place] [address]      Dear Joel,    I am a clinic community health worker who works with Ksenia Lyles MD at [facility]. I wanted to thank you for spending the time to talk with me.  Below is a description of clinic care coordination and how I can further assist you.      The clinic care coordination team is made up of a registered nurse,  and community health worker who understand the health care system. The goal of clinic care coordination is to help you manage your health and improve access to the health care system in the most efficient manner. The team can assist you in meeting your health care goals by providing education, coordinating services, strengthening the communication among your providers and supporting you with any resource needs.    Please feel free to contact me at [phone] with any questions or concerns. We are focused on providing you with the highest-quality healthcare experience possible and that all starts with you.     Sincerely,     Geovanna AGUILAR, Community Health Worker  Clinic Care Coordination  [facility] : [facility]  Phone: [phone]

## 2020-12-15 NOTE — GROUP NOTE
"Process Group Note    PATIENT'S NAME: Joel Pineda  MRN:   [id number]  :   1975  ACCT. NUMBER: [id number]  DATE OF SERVICE: 12/15/20  START TIME:  9:00 AM  END TIME:  9:50 AM  FACILITATOR: Lenore French [facility]  TOPIC:  Process Group    Diagnoses:  296.33 (F33.2) Major Depressive Disorder, Recurrent Episode, Severe _.  4. Other Diagnoses that is relevant to services:   300.00 (F41.9) Unspecified Anxiety Disorder  301.83 (F60.3) Borderline Personality Disorder.      [facility] Mental Health Day Treatment  TRACK: 6A    NUMBER OF PARTICIPANTS: 5    Telemedicine Visit: The patient's condition can be safely assessed and treated via synchronous audio and visual telemedicine encounter.      Reason for Telemedicine Visit: Services only offered telehealth    Originating Site (Patient Location): Patient's home    Distant Site (Provider Location): Provider Remote Setting    Consent:  The patient/guardian has verbally consented to: the potential risks and benefits of telemedicine (video visit) versus in person care; bill my insurance or make self-payment for services provided; and responsibility for payment of non-covered services.     Mode of Communication:  Video Conference via [employer]    As the provider I attest to compliance with applicable laws and regulations related to telemedicine.            Data:    Session content: At the start of this group, patients were invited to check in by identifying themselves, describing their current emotional status, and identifying issues to address in this group.   Area(s) of treatment focus addressed in this session included Symptom Management and Personal Safety.    Tito reported feeling \"better\" today.  He reported that he went to ER yesterday because he has been having chest pain but they could not find anything wrong. His goal for the day is to work on ways to manage his anxiety such as using an ice pack.  He reported that he has been struggling with " his ADLs slacking and having the motivation to keep up with the chores around the house.      Therapeutic Interventions/Treatment Strategies:  Psychotherapist offered support, feedback and validation and reinforced use of skills. Treatment modalities used include Motivational Interviewing, Cognitive Behavioral Therapy and Dialectical Behavioral Therapy. Interventions include Behavioral Activation: Encouraged strategies to reduce individual procrastination and increase motivation by increasing goal-directed activities to enhance mood and reduce symptoms..    Assessment:    Patient response:   Patient responded to session by accepting feedback, giving feedback and focusing on goals    Possible barriers to participation / learning include: and no barriers identified    Health Issues:   Yes: Pain, Associated Psychological Distress       Substance Use Review:   Substance Use: No active concerns identified.    Mental Status/Behavioral Observations  Appearance:   Appropriate   Eye Contact:   Good   Psychomotor Behavior: Normal   Attitude:   Cooperative   Orientation:   All  Speech   Rate / Production: Normal    Volume:  Normal   Mood:    Depressed   Affect:    Appropriate   Thought Content:   Clear  Thought Form:  Coherent  Logical     Insight:    Good     Plan:     Safety Plan: No current safety concerns identified.  Recommended that patient call 911 or go to the local ED should there be a change in any of these risk factors.     Barriers to treatment: None identified    Patient Contracts (see media tab):  None    Substance Use: Not addressed in session     Continue or Discharge: Patient will continue in Adult Day Treatment (ADT)  as planned. Patient is likely to benefit from learning and using skills as they work toward the goals identified in their treatment plan.      Lenore French, [facility]  December 15, 2020

## 2020-12-15 NOTE — TELEPHONE ENCOUNTER
Reason for Call:  Other call back    Detailed comments: Patient was seen in the ER yesterday for chest pain. Today he continues to have muscles spasm in his back, hands and legs. He is wondering he can do about this. He scheduled to see PCP on 12/21/20 at 2:00pm    Phone Number Patient can be reached at: Cell number on file:    Telephone Information:   Mobile [phone]       Best Time: Any    Can we leave a detailed message on this number? YES    Call taken on 12/15/2020 at 2:59 PM by Geovanna Hamm MA

## 2020-12-15 NOTE — TELEPHONE ENCOUNTER
This writer attempted to contact patient on 12/15/20      Reason for call informed message below and left detailed message.      If patient calls back:   [place] Care Team (MA/TC) called. Inform patient that someone from the team will contact them, document that pt called and route to care team.         Skylar Jones MA

## 2020-12-15 NOTE — GROUP NOTE
Psychotherapy Group Note    PATIENT'S NAME: Joel Pineda  MRN:   [id number]  :   1975  [id number]  DATE OF SERVICE: 12/15/20  START TIME: 10:00 AM  END TIME: 10:50 AM  FACILITATOR: Lenore French LPCC  TOPIC: MH EBP Group: Relationship Skills  [facility] Adult Mental Health Day Treatment  TRACK: 6A    NUMBER OF PARTICIPANTS: 4    Telemedicine Visit: The patient's condition can be safely assessed and treated via synchronous audio and visual telemedicine encounter.      Reason for Telemedicine Visit: Services only offered telehealth    Originating Site (Patient Location): Patient's home    Distant Site (Provider Location): Provider Remote Setting    Consent:  The patient/guardian has verbally consented to: the potential risks and benefits of telemedicine (video visit) versus in person care; bill my insurance or make self-payment for services provided; and responsibility for payment of non-covered services.     Mode of Communication:  Video Conference via [employer]    As the provider I attest to compliance with applicable laws and regulations related to telemedicine.      Summary of Group / Topics Discussed:  Relationship Skills: Conflict Resolution: Topic of conflict resolution in interpersonal communication was discussed. Patients received an overview of how communication skills during times of conflict impact symptoms and functioning. Patients discussed their current communication challenges and how this impacts their functioning. Patients also verbalized understanding of skills learned and practiced in session.     Patient Session Goals / Objectives:    Identified and discussed patient individual challenges with communication    Presented and practiced effective communication skills in session    Assisted patients in implementing more effective communication skills in their relationships      Patient Participation / Response:  Fully participated with the group by sharing personal  reflections / insights and openly received / provided feedback with other participants.    Demonstrated understanding of topics discussed through group discussion and participation, Demonstrated understanding of relationship skills and communication skills and Identified / Expressed personal readiness to incorporate effective communication skills    Treatment Plan:  Patient has a current master individualized treatment plan.  See Epic treatment plan for more information.    Lenore French, LPCC

## 2020-12-15 NOTE — PROGRESS NOTES
Clinic Care Coordination Contact  [facility]/Voicemail       Clinical Data: CHW Outreach  Outreach attempted x 1.Left message on patient's voicemail with call back information and requested return call.    Plan: CHW will try to reach patient again in 1-2 business days.    Geovanna AGUILAR Community Health Worker  Clinic Care Coordination  [facility] : Kristin Ford & Nichol Moss  Phone: [phone]    Reason for Referral: Care Transition: ED to outpatient  Additional pertinent details: 12/14/20 [facility] ED    Notes:    Seen you were recently in the ER 12/14/20    Any questions for CC RN?

## 2020-12-16 ENCOUNTER — HOSPITAL ENCOUNTER (OUTPATIENT)
Dept: BEHAVIORAL HEALTH | Facility: CLINIC | Age: 45
End: 2020-12-16
Attending: PSYCHIATRY & NEUROLOGY
Payer: MEDICARE

## 2020-12-16 PROCEDURE — 90853 GROUP PSYCHOTHERAPY: CPT | Mod: PO | Performed by: COUNSELOR

## 2020-12-16 NOTE — GROUP NOTE
Psychotherapy Group Note    PATIENT'S NAME: Joel Pineda  MRN:   [id number]  :   1975  ACCT. NUMBER: [id number]  DATE OF SERVICE: 20  START TIME: 11:00 AM  END TIME: 11:50 AM  FACILITATOR: Morenita Lau LPCC  TOPIC: MH EBP Group: Relationship Skills  [facility] Adult Mental Health Day Treatment  TRACK: 6A    NUMBER OF PARTICIPANTS: 3    Summary of Group / Topics Discussed:  Relationship Skills: Basic Communication: Patients were provided with a general overview of interpersonal communication skills and information about how communication skills impact symptoms and functioning. The goal of this topic is to help patients identify listening and issues with active listening and gain skills to work towards healthier interpersonal communication. Patients reviewed their current awareness of communication issues and how communication skills impact relationships and functioning.      Patient Session Goals / Objectives:    Identified and discussed patients individual challenges with basic communication    Presented and practiced effective communication skills in session    Assisted patients in implementing more effective communication skills in their relationships    Telemedicine Visit: The patient's condition can be safely assessed and treated via synchronous audio and visual telemedicine encounter.      Reason for Telemedicine Visit: Services only offered telehealth and due to COVIF-19    Originating Site (Patient Location): Patient's home    Distant Site (Provider Location): Provider Remote Setting    Consent:  The patient/guardian has verbally consented to: the potential risks and benefits of telemedicine (video visit) versus in person care; bill my insurance or make self-payment for services provided; and responsibility for payment of non-covered services.     Mode of Communication:  Video Conference via [employer]    As the provider I attest to compliance with applicable laws and regulations  related to telemedicine.        Patient Participation / Response:  Fully participated with the group by sharing personal reflections / insights and openly received / provided feedback with other participants.    Demonstrated understanding of topics discussed through group discussion and participation, Demonstrated understanding of relationship skills and communication skills and Identified / Expressed personal readiness to incorporate effective communication skills    Treatment Plan:  Patient has a current master individualized treatment plan.  See Epic treatment plan for more information.    Morenita Lau, LASHELLC

## 2020-12-16 NOTE — TELEPHONE ENCOUNTER
Sofía Wiggins PA-C  [facility] 13 hours ago (9:06 PM)     Notify patient that prior auth denied. Can we contact [employer] at [phone] to see if there is a patient assistance program.           Called [employer] regarding patient assistance program. Spoke with a representative named Xenia, who informed this RN that there is a patient assistance program by [employer], the drug , which is based off of yearly household income. If the patient is below the household income threshold and his application is approved, [employer] will work with provider and pharmacy to ship patient [employer] free of cost. Application available online for patient and provider to fill out. Completed application can be faxed or mailed to [employer]. Information and application for program on [employer]'s [employer] website. Representative also provided phone number for patient, which patient can use if he has questions: [phone].     Called patient and notified him that the PA for [employer] was denied. Relayed above information. Patient states he has accessed the website. He plans to fill out his section of the application and will attach the form via [employer] for Sofía to complete. [employer] phone number provided to patient. Patient appreciative and will contact clinic with further questions.    Patricia Porter RN, BSN  [facility]

## 2020-12-16 NOTE — GROUP NOTE
"Process Group Note    PATIENT'S NAME: Joel Pineda  MRN:   [id number]  :   1975  ACCT. NUMBER: [id number]  DATE OF SERVICE: 20  START TIME: 10:00 AM  END TIME: 10:50 AM  FACILITATOR: Lenore French [facility]  TOPIC:  Process Group    Diagnoses:  296.33 (F33.2) Major Depressive Disorder, Recurrent Episode, Severe _.  4. Other Diagnoses that is relevant to services:   300.00 (F41.9) Unspecified Anxiety Disorder  301.83 (F60.3) Borderline Personality Disorder.      [facility] Mental Health Day Treatment  TRACK: 6A    NUMBER OF PARTICIPANTS: 4    Telemedicine Visit: The patient's condition can be safely assessed and treated via synchronous audio and visual telemedicine encounter.      Reason for Telemedicine Visit: Services only offered telehealth    Originating Site (Patient Location): Patient's home    Distant Site (Provider Location): Provider Remote Setting    Consent:  The patient/guardian has verbally consented to: the potential risks and benefits of telemedicine (video visit) versus in person care; bill my insurance or make self-payment for services provided; and responsibility for payment of non-covered services.     Mode of Communication:  Video Conference via [employer]    As the provider I attest to compliance with applicable laws and regulations related to telemedicine.            Data:    Session content: At the start of this group, patients were invited to check in by identifying themselves, describing their current emotional status, and identifying issues to address in this group.   Area(s) of treatment focus addressed in this session included Symptom Management and Personal Safety.    Tito reported feeling \"accomplished\" today because he completed some ADLs yesterday that he had been putting off.  His goal today is to clean the bathrooms.  He plans to turn off the TV to avoid being distracted.  He reported that he has been ruminating on the holidays and how he will interact " with his step-dad because they have a tense relationship.  He took some process time to further discuss the relationship with his step-dad and how mad he gets at how his step-dad treats his mother.  He sought feedback on how to deal with his step-dad.      Therapeutic Interventions/Treatment Strategies:  Psychotherapist offered support, feedback and validation and reinforced use of skills. Treatment modalities used include Motivational Interviewing, Cognitive Behavioral Therapy and Dialectical Behavioral Therapy. Interventions include Relationship Skills: Discussed strategies to promote healthier understanding of interpersonal relationships.    Assessment:    Patient response:   Patient responded to session by accepting feedback, giving feedback and listening    Possible barriers to participation / learning include: and no barriers identified    Health Issues:   Yes: Pain, Associated Psychological Distress       Substance Use Review:   Substance Use: No active concerns identified.    Mental Status/Behavioral Observations  Appearance:   Appropriate   Eye Contact:   Good   Psychomotor Behavior: Normal   Attitude:   Cooperative   Orientation:   All  Speech   Rate / Production: Normal    Volume:  Normal   Mood:    Depressed   Affect:    Appropriate   Thought Content:   Clear  Thought Form:  Coherent  Logical     Insight:    Good     Plan:     Safety Plan: No current safety concerns identified.  Recommended that patient call 911 or go to the local ED should there be a change in any of these risk factors.     Barriers to treatment: None identified    Patient Contracts (see media tab):  None    Substance Use: Not addressed in session     Continue or Discharge: Patient will continue in Adult Day Treatment (ADT)  as planned. Patient is likely to benefit from learning and using skills as they work toward the goals identified in their treatment plan.      Lenore French, [facility]  December 16, 2020

## 2020-12-16 NOTE — GROUP NOTE
Psychotherapy Group Note    PATIENT'S NAME: Joel Pineda  MRN:   [id number]  :   1975  ACCT. NUMBER: [id number]  DATE OF SERVICE: 20  START TIME:  9:00 AM  END TIME:  9:50 AM  FACILITATOR: Morenita Lau LPCC  TOPIC: MH EBP Group: Relationship Skills  [facility] Adult Mental Health Day Treatment  TRACK: 6A    NUMBER OF PARTICIPANTS: 4    Summary of Group / Topics Discussed:  Relationship Skills: Assertive Communication: Patients were provided with a general overview of assertive communication skills and how practicing assertive communication skills will assist patients in developing healthier and more effective relationships. Patients reviewed their current awareness on ability to practice assertive communication, ways to increase assertive communication, and identified/problem solved barriers to assertive communication.     Patient Session Goals / Objectives:    Identified and discussed patient individual challenges with communication    Presented and practiced effective communication skills in session    Assisted patients in implementing more effective communication skills in their relationships    Telemedicine Visit: The patient's condition can be safely assessed and treated via synchronous audio and visual telemedicine encounter.      Reason for Telemedicine Visit: Services only offered telehealth and due to COVID-19    Originating Site (Patient Location): Patient's home    Distant Site (Provider Location): Provider Remote Setting    Consent:  The patient/guardian has verbally consented to: the potential risks and benefits of telemedicine (video visit) versus in person care; bill my insurance or make self-payment for services provided; and responsibility for payment of non-covered services.     Mode of Communication:  Video Conference via [employer]    As the provider I attest to compliance with applicable laws and regulations related to telemedicine.        Patient Participation /  Response:  Fully participated with the group by sharing personal reflections / insights and openly received / provided feedback with other participants.    Demonstrated understanding of topics discussed through group discussion and participation, Demonstrated understanding of relationship skills and communication skills and Identified / Expressed personal readiness to incorporate effective communication skills    Treatment Plan:  Patient has a current master individualized treatment plan.  See Epic treatment plan for more information.    Morenita Lau, LPCC

## 2020-12-17 ENCOUNTER — THERAPY VISIT (OUTPATIENT)
Dept: PHYSICAL THERAPY | Facility: CLINIC | Age: 45
End: 2020-12-17
Attending: PODIATRIST
Payer: MEDICARE

## 2020-12-17 DIAGNOSIS — M77.8 CAPSULITIS OF FOOT, RIGHT: ICD-10-CM

## 2020-12-17 DIAGNOSIS — M79.671 RIGHT FOOT PAIN: ICD-10-CM

## 2020-12-17 PROCEDURE — 97035 APP MDLTY 1+ULTRASOUND EA 15: CPT | Mod: KX | Performed by: PHYSICAL THERAPIST

## 2020-12-17 PROCEDURE — 97110 THERAPEUTIC EXERCISES: CPT | Mod: KX | Performed by: PHYSICAL THERAPIST

## 2020-12-17 PROCEDURE — 97161 PT EVAL LOW COMPLEX 20 MIN: CPT | Mod: KX | Performed by: PHYSICAL THERAPIST

## 2020-12-17 NOTE — LETTER
DEPARTMENT OF HEALTH AND HUMAN SERVICES  CENTERS FOR MEDICARE & MEDICAID SERVICES    PLAN/UPDATED PLAN OF PROGRESS FOR OUTPATIENT REHABILITATION          PATIENTS NAME:  Joel Pineda   : 1975  PROVIDER NUMBER:    [id number]  [id number]  PROVIDER NAME: [employer] [facility]  MEDICAL RECORD NUMBER: [id number]   START OF CARE DATE:  SOC Date: 20   TYPE:  PT  PRIMARY/TREATMENT DIAGNOSIS: (Pertinent Medical Diagnosis)  Capsulitis of foot, right  Right foot pain  VISITS FROM START OF CARE:  Rxs Used: 1   [place] [facility] Initial Evaluation  Subjective:  Patient Health History  Joel Pineda being seen for Recurrent Capsulitis Pain- Both feet.   Problem began: 12/15/2020.   Problem occurred: Unknown   Pain is reported as 4/10 on pain scale.  General health as reported by patient is fair.  Pertinent medical history includes: depression, diabetes, high blood pressure, mental illness, migraines/headaches, numbness/tingling, overweight, pain at night/rest, sleep disorder/apnea, thyroid problems and other (see list in Epic). Other medical history details: Systemic muscle spasms.   Red flags:  None as reported by patient.  Medical allergies: latex and adhesives.   Surgeries include:  Orthopedic surgery.    Current medications:  Anti-depressants, anti-inflammatory, anti-seizure medication, high blood pressure medication, pain medication, sleep medication, thyroid medication and other. Other medications details: See List.    Current occupation is Work Disabled.      Therapist Generated HPI Evaluation  Problem details: Patient presents to PT today with c/o ongoing R and L foot pain.  Patient stated he has a history of foot pain in both feet; R foot pain more recently than the L.  Patient also indicated he was receiving treatment at [facility] in [place] a few months ago and they tried a manual therapy technique on the R foot and it did not help.  Type of problem:   Bilateral feet.  This is a chronic condition.  Condition occurred with:  Insidious onset.  Where condition occurred: for unknown reasons.  Patient reports pain:  Longitudinal arch and metatarsal heads.  Pain is described as stabbing and other (stepping on a pebble) and is constant.  Pain is the same all the time.  Since onset symptoms are gradually worsening.  Associated symptoms:  Loss of motion/stiffness. Symptoms are exacerbated by activity  and relieved by rest and other (hot soaks).  Imaging testing: none done.  Barriers include:  None as reported by patient.  Objective:  Gait:    Gait Type:  Antalgic   Weight Bearing Status:  WBAT   Assistive Devices:  None  Deviations:  Lumbar:  Trunk flexionHip:  NormalKnee:  NormalAnkle:  Heel strike decr R and push off decr RGeneral Deviations:  Toe out R and toe out L  Ankle/Foot Evaluation  ROM:    AROM:    Dorsiflexion:  Left:   8  Right:   4  Plantarflexion:  Left:  63    Right:  58  Inversion:  Left:  34     Right:  30  Eversion:  26     Right:  24  Strength is normal.  PALPATION:   Left ankle tenderness present at:  plantar fascia and medial calcaneal  Right ankle tenderness present at:   plantar fascia and medial calcaneal  EDEMA: normal  Assessment/Plan:    Patient is a 45 year old male with Bilateral foot complaints.    Patient has the following significant findings with corresponding treatment plan.                Diagnosis 1:  Bilateral foot capsulitis  Pain -  hot/cold therapy, US and manual therapy  Impaired gait - gait training and assistive devices  Impaired muscle performance - neuro re-education and home program  Decreased function - therapeutic activities and home program    Therapy Evaluation Codes:   1) History comprised of:   Personal factors that impact the plan of care:      Past/current experiences.    Comorbidity factors that impact the plan of care are:      Diabetes, Depression, High blood pressure, Mental illness, Migraines/headaches,  Numbness/tingling, Sleep disorder/apnea and Bipolar Disorder, Chronic pain, Ankylosing Spondylitis in lumbar and sacral region.     Medications impacting care: Anti-depressant, Anti-inflammatory, High blood pressure, Pain, Sleep and anti-seizure medication;  pt stated see list in Epic.  2) Examination of Body Systems comprised of:   Body structures and functions that impact the plan of care:      foot pain.   Activity limitations that impact the plan of care are:      Squatting/kneeling, Stairs, Standing, Walking and Sleeping.  3) Clinical presentation characteristics are:   Stable/Uncomplicated.  4) Decision-Making    Low complexity using standardized patient assessment instrument and/or measureable assessment of functional outcome.  Cumulative Therapy Evaluation is: Low complexity.  Previous and current functional limitations:  (See Goal Flow Sheet for this information)    Short term and Long term goals: (See Goal Flow Sheet for this information)   Communication ability:  Patient appears to be able to clearly communicate and understand verbal and written communication and follow directions correctly.  Treatment Explanation - The following has been discussed with the patient:   RX ordered/plan of care  Anticipated outcomes  Possible risks and side effects  This patient would benefit from PT intervention to resume normal activities.   Rehab potential is good.  Frequency:  1 X week, once daily  Duration:  for 8 weeks  Discharge Plan:  Achieve all LTG.  Independent in home treatment program.  Reach maximal therapeutic benefit.  Please refer to the daily flowsheet for treatment today, total treatment time and time spent performing 1:1 timed codes.     Caregiver Signature/Credentials _______________________________________________ Date ________        Domi Peterson, [facility]     I have reviewed and certified the need for these services and plan of treatment while under my care.        PHYSICIAN'S SIGNATURE:    "______________________________________________  Date___________     Peng Perez MD    Certification period:  Beginning of Cert date period: 12/17/20 to  End of Cert period date: 03/16/21   Functional Level Progress Report: Please see attached \"Goal Flow sheet for Functional level.\"  ____X____ Continue Services or       ________ DC Services              Service dates: From  SOC Date: 12/17/20 date to present                      "

## 2020-12-17 NOTE — LETTER
DEPARTMENT OF HEALTH AND HUMAN SERVICES  CENTERS FOR MEDICARE & MEDICAID SERVICES    PLAN/UPDATED PLAN OF PROGRESS FOR OUTPATIENT REHABILITATION     PATIENTS NAME:  Joel Pineda   : 1975  PROVID[facility] NUMBER:    [id number]  [id number]   PROVIDER NAME: [facility]  MEDICAL RECORD NUMBER: [id number]   START OF CARE DATE:  SOC Date: 20   TYPE:  PT  PRIMARY/TREATMENT DIAGNOSIS: (Pertinent Medical Diagnosis)  Capsulitis of foot, right  Right foot pain  VISITS FROM START OF CARE:  Rxs Used: 1     [place] [facility] Initial Evaluation  Subjective:  Patient Health History  Joel Pineda being seen for Recurrent Capsulitis Pain.   Problem began: 12/15/2020.   Problem occurred: Unknown   Pain is reported as 4/10 on pain scale.  General health as reported by patient is fair.  Pertinent medical history includes: depression, diabetes, high blood pressure, mental illness, migraines/headaches, numbness/tingling, overweight, pain at night/rest, sleep disorder/apnea, thyroid problems and other (see list in Epic). Other medical history details: Systemic muscle spasms.   Red flags:  None as reported by patient.  Medical allergies: latex and adhesives.   Surgeries include:  Orthopedic surgery.    Current medications:  Anti-depressants, anti-inflammatory, anti-seizure medication, high blood pressure medication, pain medication, sleep medication, thyroid medication and other. Other medications details: See List.    Current occupation is Work Disabled.     Therapist Generated HPI Evaluation  Problem details: Patient presents to PT today with c/o ongoing R foot pain.  Patient stated he has a history of foot pain in both feel; R foot pain more recently.  Patient also indicated he was receiving treatment at [facility] in [place] a few months ago and they tried a manual therapy technique on the R foot and it did not help.  Type of problem:  Right ankle.    This is a  chronic condition.  Condition occurred with:  Insidious onset.  Where condition occurred: for unknown reasons.  Patient reports pain:  Longitudinal arch and metatarsal heads.  Pain is described as stabbing and other (stepping on a pebble) and is constant.  Pain is the same all the time.  Since onset symptoms are gradually worsening.  Associated symptoms:  Loss of motion/stiffness. Symptoms are exacerbated by activity  and relieved by rest and other (hot soaks).  Imaging testing: none done.  Barriers include:  None as reported by patient.          Objective:  Gait:    Gait Type:  Antalgic   Weight Bearing Status:  WBAT   Assistive Devices:  None  Deviations:  Lumbar:  Trunk flexionHip:  NormalKnee:  NormalAnkle:  Heel strike decr R and push off decr RGeneral Deviations:  Toe out R and toe out L  Ankle/Foot Evaluation  ROM:    AROM:    Dorsiflexion:  Left:   8  Right:   4  Plantarflexion:  Left:  63    Right:  58  Inversion:  Left:  34     Right:  30  Eversion:  26     Right:  24  Strength is normal.  PALPATION:   Right ankle tenderness present at:   plantar fascia and medial calcaneal  EDEMA: normal    Assessment/Plan:    Patient is a 45 year old male with R foot complaints.    Patient has the following significant findings with corresponding treatment plan.                Diagnosis 1:  R foot capsulitis  Pain -  hot/cold therapy, US and manual therapy  Impaired gait - gait training and assistive devices  Impaired muscle performance - neuro re-education and home program  Decreased function - therapeutic activities and home program    Therapy Evaluation Codes:   1) History comprised of:   Personal factors that impact the plan of care:      Past/current experiences.    Comorbidity factors that impact the plan of care are:      Diabetes, Depression, High blood pressure, Mental illness, Migraines/headaches, Numbness/tingling, Sleep disorder/apnea and Bipolar Disorder, Chronic pain, Ankylosing Spondylitis in lumbar and  sacral region.     Medications impacting care: Anti-depressant, Anti-inflammatory, High blood pressure, Pain, Sleep and anti-seizure medication;  pt stated see list in Epic.  2) Examination of Body Systems comprised of:   Body structures and functions that impact the plan of care:      foot pain.   Activity limitations that impact the plan of care are:      Squatting/kneeling, Stairs, Standing, Walking and Sleeping.  3) Clinical presentation characteristics are:   Stable/Uncomplicated.  4) Decision-Making    Low complexity using standardized patient assessment instrument and/or measureable assessment of functional outcome.  Cumulative Therapy Evaluation is: Low complexity.  Previous and current functional limitations:  (See Goal Flow Sheet for this information)    Short term and Long term goals: (See Goal Flow Sheet for this information)   Communication ability:  Patient appears to be able to clearly communicate and understand verbal and written communication and follow directions correctly.  Treatment Explanation - The following has been discussed with the patient:   RX ordered/plan of care  Anticipated outcomes  Possible risks and side effects  This patient would benefit from PT intervention to resume normal activities.   Rehab potential is good.  Frequency:  1 X week, once daily  Duration:  for 8 weeks  Discharge Plan:  Achieve all LTG.  Independent in home treatment program.  Reach maximal therapeutic benefit.    Please refer to the daily flowsheet for treatment today, total treatment time and time spent performing 1:1 timed codes.     Caregiver Signature/Credentials ______________________________________________ Date ________        RAEGAN Dill   I have reviewed and certified the need for these services and plan of treatment while under my care.        PHYSICIAN'S SIGNATURE:   ______________________________________________  Date___________     Peng Perez MD    Certification period:  Beginning of  "Cert date period: 12/17/20 to  End of Cert period date: 03/16/21   Functional Level Progress Report: Please see attached \"Goal Flow sheet for Functional level.\"  ____X____ Continue Services or       ________ DC Services              Service dates: From  SOC Date: 12/17/20 date to present                       "

## 2020-12-17 NOTE — PROGRESS NOTES
[place] [facility] Initial Evaluation  Subjective:    Patient Health History  Joel Pineda being seen for Recurrent Capsulitis Pain- Both feet.     Problem began: 12/15/2020.   Problem occurred: Unknown   Pain is reported as 4/10 on pain scale.  General health as reported by patient is fair.  Pertinent medical history includes: depression, diabetes, high blood pressure, mental illness, migraines/headaches, numbness/tingling, overweight, pain at night/rest, sleep disorder/apnea, thyroid problems and other (see list in Epic). Other medical history details: Systemic muscle spasms.   Red flags:  None as reported by patient.  Medical allergies: latex and adhesives.   Surgeries include:  Orthopedic surgery.    Current medications:  Anti-depressants, anti-inflammatory, anti-seizure medication, high blood pressure medication, pain medication, sleep medication, thyroid medication and other. Other medications details: See List.    Current occupation is Work Disabled.                     Therapist Generated HPI Evaluation  Problem details: Patient presents to PT today with c/o ongoing R and L foot pain.  Patient stated he has a history of foot pain in both feet; R foot pain more recently than the L.  Patient also indicated he was receiving treatment at [facility] in [place] a few months ago and they tried a manual therapy technique on the R foot and it did not help.      .         Type of problem:  Bilateral feet.    This is a chronic condition.  Condition occurred with:  Insidious onset.  Where condition occurred: for unknown reasons.  Patient reports pain:  Longitudinal arch and metatarsal heads.  Pain is described as stabbing and other (stepping on a pebble) and is constant.  Pain is the same all the time.  Since onset symptoms are gradually worsening.  Associated symptoms:  Loss of motion/stiffness. Symptoms are exacerbated by activity  and relieved by rest and other (hot soaks).  Imaging testing:  none done.    Barriers include:  None as reported by patient.                        Objective:    Gait:    Gait Type:  Antalgic   Weight Bearing Status:  WBAT   Assistive Devices:  None  Deviations:  Lumbar:  Trunk flexionHip:  NormalKnee:  NormalAnkle:  Heel strike decr R and push off decr RGeneral Deviations:  Toe out R and toe out L          Ankle/Foot Evaluation  ROM:    AROM:    Dorsiflexion:  Left:   8  Right:   4  Plantarflexion:  Left:  63    Right:  58  Inversion:  Left:  34     Right:  30  Eversion:  26     Right:  24        Strength is normal.      PALPATION:   Left ankle tenderness present at:  plantar fascia and medial calcaneal  Right ankle tenderness present at:   plantar fascia and medial calcaneal  EDEMA: normal                                                              General     ROS    Assessment/Plan:    Patient is a 45 year old male with Bilateral foot complaints.    Patient has the following significant findings with corresponding treatment plan.                Diagnosis 1:  Bilateral foot capsulitis  Pain -  hot/cold therapy, US and manual therapy  Impaired gait - gait training and assistive devices  Impaired muscle performance - neuro re-education and home program  Decreased function - therapeutic activities and home program    Therapy Evaluation Codes:   1) History comprised of:   Personal factors that impact the plan of care:      Past/current experiences.    Comorbidity factors that impact the plan of care are:      Diabetes, Depression, High blood pressure, Mental illness, Migraines/headaches, Numbness/tingling, Sleep disorder/apnea and Bipolar Disorder, Chronic pain, Ankylosing Spondylitis in lumbar and sacral region.     Medications impacting care: Anti-depressant, Anti-inflammatory, High blood pressure, Pain, Sleep and anti-seizure medication;  pt stated see list in Epic.  2) Examination of Body Systems comprised of:   Body structures and functions that impact the plan of care:       foot pain.   Activity limitations that impact the plan of care are:      Squatting/kneeling, Stairs, Standing, Walking and Sleeping.  3) Clinical presentation characteristics are:   Stable/Uncomplicated.  4) Decision-Making    Low complexity using standardized patient assessment instrument and/or measureable assessment of functional outcome.  Cumulative Therapy Evaluation is: Low complexity.    Previous and current functional limitations:  (See Goal Flow Sheet for this information)    Short term and Long term goals: (See Goal Flow Sheet for this information)     Communication ability:  Patient appears to be able to clearly communicate and understand verbal and written communication and follow directions correctly.  Treatment Explanation - The following has been discussed with the patient:   RX ordered/plan of care  Anticipated outcomes  Possible risks and side effects  This patient would benefit from PT intervention to resume normal activities.   Rehab potential is good.    Frequency:  1 X week, once daily  Duration:  for 8 weeks  Discharge Plan:  Achieve all LTG.  Independent in home treatment program.  Reach maximal therapeutic benefit.    Please refer to the daily flowsheet for treatment today, total treatment time and time spent performing 1:1 timed codes.

## 2020-12-18 ENCOUNTER — MYC REFILL (OUTPATIENT)
Dept: FAMILY MEDICINE | Facility: CLINIC | Age: 45
End: 2020-12-18

## 2020-12-18 ENCOUNTER — HOSPITAL ENCOUNTER (OUTPATIENT)
Dept: BEHAVIORAL HEALTH | Facility: CLINIC | Age: 45
End: 2020-12-18
Attending: PSYCHIATRY & NEUROLOGY
Payer: MEDICARE

## 2020-12-18 DIAGNOSIS — M45.8 ANKYLOSING SPONDYLITIS OF SACRAL REGION (H): ICD-10-CM

## 2020-12-18 DIAGNOSIS — I10 ESSENTIAL HYPERTENSION WITH GOAL BLOOD PRESSURE LESS THAN 140/90: Chronic | ICD-10-CM

## 2020-12-18 DIAGNOSIS — M51.369 DDD (DEGENERATIVE DISC DISEASE), LUMBAR: ICD-10-CM

## 2020-12-18 DIAGNOSIS — E53.8 B12 DEFICIENCY: ICD-10-CM

## 2020-12-18 DIAGNOSIS — E11.8 TYPE 2 DIABETES MELLITUS WITH COMPLICATION, WITHOUT LONG-TERM CURRENT USE OF INSULIN (H): ICD-10-CM

## 2020-12-18 PROCEDURE — 90853 GROUP PSYCHOTHERAPY: CPT | Mod: GT | Performed by: COUNSELOR

## 2020-12-18 NOTE — GROUP NOTE
Psychotherapy Group Note    PATIENT'S NAME: Joel Pineda  MRN:   [id number]  :   1975  ACCT. NUMBER: [id number]  DATE OF SERVICE: 20  START TIME: 11:00 AM  END TIME: 11:50 AM  FACILITATOR: Morenita Lau LPCC  TOPIC: MH EBP Group: Relationship Skills  [facility] Adult Mental Health Day Treatment  TRACK: 6A    NUMBER OF PARTICIPANTS: 4    Summary of Group / Topics Discussed:  Relationship Skills: Conflict Resolution: Topic of conflict resolution in interpersonal communication was discussed. Patients received an overview of how communication skills during times of conflict impact symptoms and functioning. Patients discussed their current communication challenges and how this impacts their functioning. Patients also verbalized understanding of skills learned and practiced in session.     Patient Session Goals / Objectives:    Identified and discussed patient individual challenges with communication    Presented and practiced effective communication skills in session    Assisted patients in implementing more effective communication skills in their relationships    Telemedicine Visit: The patient's condition can be safely assessed and treated via synchronous audio and visual telemedicine encounter.      Reason for Telemedicine Visit: Services only offered telehealth and due to COVID-19    Originating Site (Patient Location): Patient's home    Distant Site (Provider Location): Provider Remote Setting    Consent:  The patient/guardian has verbally consented to: the potential risks and benefits of telemedicine (video visit) versus in person care; bill my insurance or make self-payment for services provided; and responsibility for payment of non-covered services.     Mode of Communication:  Video Conference via [employer]    As the provider I attest to compliance with applicable laws and regulations related to telemedicine.        Patient Participation / Response:  Fully participated with the group  by sharing personal reflections / insights and openly received / provided feedback with other participants.    Demonstrated understanding of topics discussed through group discussion and participation, Demonstrated understanding of relationship skills and communication skills and Identified / Expressed personal readiness to incorporate effective communication skills    Treatment Plan:  Patient has a current master individualized treatment plan.  See Epic treatment plan for more information.    Morenita Lau, LPCC

## 2020-12-18 NOTE — GROUP NOTE
Psychotherapy Group Note    PATIENT'S NAME: Joel Pineda  MRN:   [id number]  :   1975  ACCT. NUMBER: [id number]  DATE OF SERVICE: 20  START TIME: 10:00 AM  END TIME: 10:50 AM  FACILITATOR: Morenita Lau LPCC  TOPIC: MH EBP Group: Relationship Skills  [facility] Adult Partial Hospitalization Program  TRACK: [facility]    NUMBER OF PARTICIPANTS: 4    Summary of Group / Topics Discussed:  Relationship Skills: Boundaries: Patients were provided with a general overview of interpersonal boundaries and how lack of boundaries relates to symptoms and functioning. The purpose is to help patients identify boundary issues and gain awareness and skills to work towards healthier interpersonal boundaries. Current awareness of healthy boundary characteristics and barriers to establishing healthy boundaries were discussed.    Patient Session Goals / Objectives:    Familiarized patients with the concept of interpersonal boundaries and their characteristics    Discussed and practiced strategies to promote healthier interpersonal boundaries    Identified boundary issues and identified plan to improve boundaries    Telemedicine Visit: The patient's condition can be safely assessed and treated via synchronous audio and visual telemedicine encounter.      Reason for Telemedicine Visit: Services only offered telehealth and due to COVID-19    Originating Site (Patient Location): Patient's home    Distant Site (Provider Location): Provider Remote Setting    Consent:  The patient/guardian has verbally consented to: the potential risks and benefits of telemedicine (video visit) versus in person care; bill my insurance or make self-payment for services provided; and responsibility for payment of non-covered services.     Mode of Communication:  Video Conference via [employer]    As the provider I attest to compliance with applicable laws and regulations related to telemedicine.        Patient Participation /  Response:  Fully participated with the group by sharing personal reflections / insights and openly received / provided feedback with other participants.    Demonstrated understanding of topics discussed through group discussion and participation, Demonstrated understanding of relationship skills and communication skills and Identified / Expressed personal readiness to incorporate effective communication skills    Treatment Plan:  Patient has a current master individualized treatment plan.  See Epic treatment plan for more information.    Morenita Lau, LPCC

## 2020-12-18 NOTE — GROUP NOTE
"Process Group Note    PATIENT'S NAME: Joel Pineda  MRN:   [id number]  :   1975  ACCT. NUMBER: [id number]  DATE OF SERVICE: 20  START TIME:  9:00 AM  END TIME:  9:50 AM  FACILITATOR: Lenore [facility]  TOPIC:  Process Group    Diagnoses:  296.33 (F33.2) Major Depressive Disorder, Recurrent Episode, Severe _.  4. Other Diagnoses that is relevant to services:   300.00 (F41.9) Unspecified Anxiety Disorder  301.83 (F60.3) Borderline Personality Disorder.      [facility] Mental Health Day Treatment  TRACK: 6A    NUMBER OF PARTICIPANTS: 5    Telemedicine Visit: The patient's condition can be safely assessed and treated via synchronous audio and visual telemedicine encounter.      Reason for Telemedicine Visit: Services only offered telehealth    Originating Site (Patient Location): Patient's home    Distant Site (Provider Location): Provider Remote Setting    Consent:  The patient/guardian has verbally consented to: the potential risks and benefits of telemedicine (video visit) versus in person care; bill my insurance or make self-payment for services provided; and responsibility for payment of non-covered services.     Mode of Communication:  Video Conference via [employer]    As the provider I attest to compliance with applicable laws and regulations related to telemedicine.            Data:    Session content: At the start of this group, patients were invited to check in by identifying themselves, describing their current emotional status, and identifying issues to address in this group.   Area(s) of treatment focus addressed in this session included Symptom Management and Personal Safety.    Tito reported feeling \"prideful\" because her cat ate all of her food this mornings.  His goal for the day is to get a haircut later today.  He reported having no plans for the weekend and not looking forward to it.  He reported that he has been having muscle spasms that his doctor thinks may be " due to a recent medication change.  Client declined additional process time but contributed to group discussion and provided feedback and support to peers.      Therapeutic Interventions/Treatment Strategies:  Psychotherapist offered support, feedback and validation.    Assessment:    Patient response:   Patient responded to session by accepting feedback, giving feedback and listening    Possible barriers to participation / learning include: and no barriers identified    Health Issues:   None reported       Substance Use Review:   Substance Use: No active concerns identified.    Mental Status/Behavioral Observations  Appearance:   Appropriate   Eye Contact:   Good   Psychomotor Behavior: Normal   Attitude:   Cooperative   Orientation:   All  Speech   Rate / Production: Normal    Volume:  Normal   Mood:    Anxious  Depressed   Affect:    Appropriate   Thought Content:   Clear  Thought Form:  Coherent  Logical     Insight:    Good     Plan:     Safety Plan: No current safety concerns identified.  Recommended that patient call 911 or go to the local ED should there be a change in any of these risk factors.     Barriers to treatment: None identified    Patient Contracts (see media tab):  None    Substance Use: Not addressed in session     Continue or Discharge: Patient will continue in Adult Day Treatment (ADT)  as planned. Patient is likely to benefit from learning and using skills as they work toward the goals identified in their treatment plan.      Lenore French, [facility]  December 18, 2020

## 2020-12-19 ENCOUNTER — VIRTUAL VISIT (OUTPATIENT)
Dept: FAMILY MEDICINE | Facility: OTHER | Age: 45
End: 2020-12-19
Payer: MEDICARE

## 2020-12-19 PROCEDURE — 99421 OL DIG E/M SVC 5-10 MIN: CPT | Performed by: PREVENTIVE MEDICINE

## 2020-12-20 NOTE — PROGRESS NOTES
"Date: 2020 19:15:10  Clinician: Anibal Cavazos  Clinician NPI: [id number]  Patient: Joel Pineda  Patient : 1975  Patient Address: [facility] [place] [address]  Patient Phone: [phone]  Visit Protocol: Shingles  Patient Summary:  Joel is a 45 year old ( : 1975 ) male who initiated a [facility] Visit for suspected Herpes zoster (shingles). When asked the question \"Please sign me up to receive news, health information and promotions from [facility].\", Joel responded \"No\".    Images of his skin condition were uploaded.   His symptoms started 1-3 days ago. The right side of his body is affected. The rash is located on his neck. The rash is red and includes drainage and sores.   The affected area feels warm to touch, tender to touch, like it burns, and painful. The symptoms do not interfere with his sleep.   Symptom details     Drainage: The color of the drainage is white.    Pain: The pain is mild (between 1-3 on a 10 point pain scale).     Denied symptoms include crusts, scabs, dry skin, flaky skin, scaly skin, numbness, blisters, and itchiness. He did not experience any pain or unusual sensations in the location of the rash before it appeared. Joel does not feel feverish.   Pertinent medical history  Joel has had chickenpox and has had shingles in the past.   Joel reports having the following immunosuppressive condition(s) - long-term use of steroids or other immunosuppressive medications.   Joel has been told by his provider to avoid NSAIDs.   Joel does not need a return to work/school note.   Joel does not smoke or use smokeless tobacco.   Additional information as reported by the patient (free text): I also noticed an intermittent sore throat that, in hindsight, began around the same time as the pustule.     MEDICATIONS: glipizide oral, nabumetone oral, ramipril oral, Lovaza oral, alprazolam oral, Tylenol Extra Strength oral, Cymbalta oral, melatonin oral, " Maxalt-MLT oral, oxycodone oral, Aspirin Low Dose oral, Zyrtec oral, famotidine oral, Ventolin HFA inhalation, Seroquel oral, cyanocobalamin (vit B-12) injection, Enbrel SureClick subcutaneous, metoprolol succinate oral, Singulair oral, Lyrica oral, omeprazole oral, Advair Diskus inhalation, ALLERGIES: Augmentin, ketoconazole, Flagyl, Penicillins  Clinician Response:  Haseeb Maldonado,   See below    Diagnosis: Cellulitis of unspecified part of limb  Diagnosis ICD: L03.119  Prescription: clindamycin HCl (Cleocin HCl) 300 mg oral capsule 28 capsule, 7 days supply. Take 1 capsule by mouth every 6 hours for 7 days. Refills: 0, Refill as needed: no, Allow substitutions: yes  Pharmacy: [id number] - [phone] - [address] [place] [address]

## 2020-12-21 ENCOUNTER — OFFICE VISIT (OUTPATIENT)
Dept: FAMILY MEDICINE | Facility: CLINIC | Age: 45
End: 2020-12-21
Payer: MEDICARE

## 2020-12-21 VITALS
WEIGHT: 315 LBS | OXYGEN SATURATION: 98 % | RESPIRATION RATE: 18 BRPM | HEIGHT: 78 IN | TEMPERATURE: 98.5 F | SYSTOLIC BLOOD PRESSURE: 136 MMHG | HEART RATE: 83 BPM | DIASTOLIC BLOOD PRESSURE: 85 MMHG | BODY MASS INDEX: 36.45 KG/M2

## 2020-12-21 DIAGNOSIS — E78.5 HYPERLIPIDEMIA LDL GOAL <70: ICD-10-CM

## 2020-12-21 DIAGNOSIS — I10 ESSENTIAL HYPERTENSION WITH GOAL BLOOD PRESSURE LESS THAN 140/90: ICD-10-CM

## 2020-12-21 DIAGNOSIS — M51.369 DDD (DEGENERATIVE DISC DISEASE), LUMBAR: ICD-10-CM

## 2020-12-21 DIAGNOSIS — E11.8 TYPE 2 DIABETES MELLITUS WITH COMPLICATION, WITHOUT LONG-TERM CURRENT USE OF INSULIN (H): Primary | ICD-10-CM

## 2020-12-21 DIAGNOSIS — G62.9 NEUROPATHY: ICD-10-CM

## 2020-12-21 PROCEDURE — 99214 OFFICE O/P EST MOD 30 MIN: CPT | Performed by: FAMILY MEDICINE

## 2020-12-21 RX ORDER — LANOLIN ALCOHOL/MO/W.PET/CERES
13 CREAM (GRAM) TOPICAL
COMMUNITY
Start: 2020-12-01 | End: 2024-08-06

## 2020-12-21 RX ORDER — PREGABALIN 150 MG/1
150 CAPSULE ORAL 2 TIMES DAILY
Qty: 60 CAPSULE | Refills: 11 | Status: SHIPPED | OUTPATIENT
Start: 2020-12-21 | End: 2021-04-19

## 2020-12-21 RX ORDER — METOPROLOL SUCCINATE 200 MG/1
200 TABLET, EXTENDED RELEASE ORAL 2 TIMES DAILY
Qty: 180 TABLET | Refills: 1 | Status: SHIPPED | OUTPATIENT
Start: 2020-12-21 | End: 2021-06-21

## 2020-12-21 RX ORDER — NABUMETONE 500 MG/1
500-1000 TABLET, FILM COATED ORAL 2 TIMES DAILY WITH MEALS
Qty: 60 TABLET | Refills: 5 | Status: SHIPPED | OUTPATIENT
Start: 2020-12-21 | End: 2021-04-13

## 2020-12-21 RX ORDER — CYANOCOBALAMIN 1000 UG/ML
1 INJECTION, SOLUTION INTRAMUSCULAR; SUBCUTANEOUS
Qty: 10 ML | Refills: 0 | Status: SHIPPED | OUTPATIENT
Start: 2020-12-21 | End: 2021-09-13

## 2020-12-21 RX ORDER — GLIPIZIDE 5 MG/1
5 TABLET, FILM COATED, EXTENDED RELEASE ORAL DAILY
Qty: 90 TABLET | Refills: 1 | Status: SHIPPED | OUTPATIENT
Start: 2020-12-21 | End: 2021-05-17 | Stop reason: DRUGHIGH

## 2020-12-21 ASSESSMENT — MIFFLIN-ST. JEOR: SCORE: 2568.62

## 2020-12-21 ASSESSMENT — PAIN SCALES - GENERAL: PAINLEVEL: MILD PAIN (3)

## 2020-12-21 NOTE — PATIENT INSTRUCTIONS
At [facility], we strive to deliver an exceptional experience to you, every time we see you. If you receive a survey, please complete it as we do value your feedback.  If you have MyChart, you can expect to receive results automatically within 24 hours of their completion.  Your provider will send a note interpreting your results as well.   If you do not have MyChart, you should receive your results in about a week by mail.    Your care team:                            Family Medicine Internal Medicine   MD Houston Frances MD Shantel Branch-Fleming, MD Srinivasa Vaka, MD Katya Georgiev PA-C Megan Hill, APRN CNP    Javier Cavazos, MD Pediatrics   Klever Obando, PALeydaC  Magdalene Scherer, CNP MD Radha Victor APRN CNP   MD Humaira Devlin MD Deborah Mielke, MD Diana Kauffman, APRN CNP  Faith Shanks, PALeydaC  Jing Priest, CNP  MD Felipa Narayan MD Angela Wermerskirchen, MD      Clinic hours: Monday - Thursday 7 am-7 pm; Fridays 7 am-5 pm.   Urgent care: Monday - Friday 11 am-9 pm; Saturday and Sunday 9 am-5 pm.    Clinic: [phone]       [facility]: Monday - Thursday 8 am - 7 pm; Friday 8 am - 6 pm  [facility]: [phone]     Use [email or web address] for 24/7 diagnosis and treatment of dozens of conditions.

## 2020-12-21 NOTE — PROGRESS NOTES
Subjective     Joel Pineda is a 45 year old male who presents to clinic today for the following health issues:    HPI         Diabetes Follow-up      How often are you checking your blood sugar? Not at all    What concerns do you have today about your diabetes? None     Do you have any of these symptoms? (Select all that apply)  Numbness in feet and Burning in feet              Hyperlipidemia Follow-Up      Are you regularly taking any medication or supplement to lower your cholesterol?   No    Are you having muscle aches or other side effects that you think could be caused by your cholesterol lowering medication? NA    Hypertension Follow-up      Do you check your blood pressure regularly outside of the clinic? No     Are you following a low salt diet? Yes    Are your blood pressures ever more than 140 on the top number (systolic) OR more   than 90 on the bottom number (diastolic), for example 140/90? NA    BP Readings from Last 2 Encounters:   12/21/20 136/85   10/14/20 (!) 131/98     Hemoglobin A1C (%)   Date Value   10/16/2020 6.0 (H)   08/18/2020 6.1 (H)     LDL Cholesterol Calculated (mg/dL)   Date Value   10/16/2020 45   09/13/2019     Cannot estimate LDL when triglyceride exceeds 400 mg/dL     LDL Cholesterol Direct (mg/dL)   Date Value   09/13/2019 64       Depression and Anxiety Follow-Up    How are you doing with your depression since your last visit? Worsened     How are you doing with your anxiety since your last visit?  Worsened     Are you having other symptoms that might be associated with depression or anxiety? Yes currently in the group therapy    Have you had a significant life event? No     Do you have any concerns with your use of alcohol or other drugs? No    Social History     Tobacco Use     Smoking status: Never Smoker     Smokeless tobacco: Never Used   Substance Use Topics     Alcohol use: Not Currently     Alcohol/week: 0.0 standard drinks     Drinks per session: 1 or 2     Binge  frequency: Weekly     Comment: occ 1/week     Drug use: No     PHQ 2/17/2020 5/1/2020 7/7/2020   PHQ-9 Total Score 17 13 15   Q9: Thoughts of better off dead/self-harm past 2 weeks More than half the days Several days Several days   F/U: Thoughts of suicide or self-harm - - -   F/U: Safety concerns - - -   Some encounter information is confidential and restricted. Go to Review Flowsheets activity to see all data.     YUDI-7 SCORE 2/17/2020 5/1/2020 12/1/2020   Total Score - - -   Total Score - - 13 (moderate anxiety)   Total Score 13 10 -   Total Score BEH Adult - - -   Some encounter information is confidential and restricted. Go to Review Flowsheets activity to see all data.       Suicide Assessment Five-step Evaluation and Treatment (SAFE-T)    Asthma Follow-Up    Was ACT completed today?    Yes    ACT Total Scores 5/18/2020   ACT TOTAL SCORE -   ASTHMA ER VISITS -   ASTHMA HOSPITALIZATIONS -   ACT TOTAL SCORE (Goal Greater than or Equal to 20) 20   In the past 12 months, how many times did you visit the emergency room for your asthma without being admitted to the hospital? 0   In the past 12 months, how many times were you hospitalized overnight because of your asthma? 0          How many days per week do you miss taking your asthma controller medication?  0    Please describe any recent triggers for your asthma: cough and mask    Have you had any Emergency Room Visits, Urgent Care Visits, or Hospital Admissions since your last office visit?  No    Hypothyroidism Follow-up      Since last visit, patient describes the following symptoms:     Chronic/Recurring Back Pain Follow Up      Where is your back pain located? (Select all that apply) low back bilateral    How would you describe your back pain?  Dull ache, stabbing, throbbing    Where does your back pain spread? nowhere    Since your last clinic visit for back pain, how has your pain changed? Unsure, last PT 6 weeks ago    Does your back pain interfere with  "your job? Not applicable    Since your last visit, have you tried any new treatment? No      How many servings of fruits and vegetables do you eat daily? 1-2 can    On average, how many sweetened beverages do you drink each day (Examples: soda, juice, sweet tea, etc.  Do NOT count diet or artificially sweetened beverages)?   0    How many days per week do you exercise enough to make your heart beat faster? 3 or less    How many minutes a day do you exercise enough to make your heart beat faster? 9 or less    How many days per week do you miss taking your medication? 0    SUBJECTIVE:  Here today in routine follow-up of diabetes, hypertension, lipids.  Also has some paperwork regarding the patient assistance program for his Lyrica for chronic back pain.  Has cut down to a dosage of 150 mg twice daily and this seems to cause less side effects.    Review of systems otherwise negative.  Past medical, family, and social history reviewed and updated in chart.    OBJECTIVE:  /85 (BP Location: Left arm, Patient Position: Chair, Cuff Size: Adult Large)   Pulse 83   Temp 98.5  F (36.9  C) (Oral)   Resp 18   Ht 1.981 m (6' 6\")   Wt (!) 156 kg (344 lb)   SpO2 98%   BMI 39.75 kg/m    Alert, pleasant, upbeat, and in no apparent discomfort.  S1 and S2 normal, no murmurs, clicks, gallops or rubs. Regular rate and rhythm. Chest is clear; no wheezes or rales. No edema or JVD.  Past labs reviewed with the patient.     ASSESSMENT / PLAN:  (E11.8) Type 2 diabetes mellitus with complication, without long-term current use of insulin (H)  (primary encounter diagnosis)  Comment: A1c very well controlled on current regimen.  Will continue same and plan to recheck in 6 months  Plan:     (I10) Essential hypertension with goal blood pressure less than 140/90  Comment: Stable on current regimen.  Continue same plan and routine follow-up.   Plan:     (E78.5) Hyperlipidemia LDL goal <70  Comment: Stable on current regimen.  Continue " same plan and routine follow-up.   Plan:     (G62.9) Neuropathy  Comment: 150 mg twice daily.  Patient assistance program paperwork completed  Plan: pregabalin (LYRICA) 150 MG capsule            (M51.36) DDD (degenerative disc disease), lumbar  Comment: As above  Plan: pregabalin (LYRICA) 150 MG capsule            Follow up 3 months  JA Lyles MD    (Chart documentation completed in part with Dragon voice-recognition software.  Even though reviewed some grammatical, spelling, and word errors may remain.)

## 2020-12-21 NOTE — TELEPHONE ENCOUNTER
Patient has appointment today, Please advise on refills during visit.     Martell Chaves RN, BSN, PHN

## 2020-12-22 ENCOUNTER — HOSPITAL ENCOUNTER (OUTPATIENT)
Dept: BEHAVIORAL HEALTH | Facility: CLINIC | Age: 45
End: 2020-12-22
Attending: PSYCHIATRY & NEUROLOGY
Payer: MEDICARE

## 2020-12-22 PROCEDURE — 90853 GROUP PSYCHOTHERAPY: CPT | Mod: GT | Performed by: COUNSELOR

## 2020-12-22 ASSESSMENT — ASTHMA QUESTIONNAIRES: ACT_TOTALSCORE: 20

## 2020-12-22 NOTE — GROUP NOTE
"Process Group Note    PATIENT'S NAME: Joel Pineda  MRN:   [id number]  :   1975  ACCT. NUMBER: [id number]  DATE OF SERVICE: 20  START TIME:  9:00 AM  END TIME:  9:50 AM  FACILITATOR: Lenore French [facility]  TOPIC:  Process Group    Diagnoses:  296.33 (F33.2) Major Depressive Disorder, Recurrent Episode, Severe _.  4. Other Diagnoses that is relevant to services:   300.00 (F41.9) Unspecified Anxiety Disorder  301.83 (F60.3) Borderline Personality Disorder.      [facility] Mental Health Day Treatment  TRACK: 1B    NUMBER OF PARTICIPANTS: 4    Telemedicine Visit: The patient's condition can be safely assessed and treated via synchronous audio and visual telemedicine encounter.      Reason for Telemedicine Visit: Services only offered telehealth    Originating Site (Patient Location): Patient's home    Distant Site (Provider Location): Provider Remote Setting    Consent:  The patient/guardian has verbally consented to: the potential risks and benefits of telemedicine (video visit) versus in person care; bill my insurance or make self-payment for services provided; and responsibility for payment of non-covered services.     Mode of Communication:  Video Conference via Room Choice    As the provider I attest to compliance with applicable laws and regulations related to telemedicine.          Data:    Session content: At the start of this group, patients were invited to check in by identifying themselves, describing their current emotional status, and identifying issues to address in this group.   Area(s) of treatment focus addressed in this session included Symptom Management and Personal Safety.    Tito reported feeling \"frustrated\" because he slipped and fell on the stairs today.  His goal for the day is to practice self-care because he knows that he will be in pain later.  Client declined additional process time but contributed to group discussion and provided feedback and support to " peers.      Therapeutic Interventions/Treatment Strategies:  Psychotherapist offered support, feedback and validation.    Assessment:    Patient response:   Patient responded to session by accepting feedback, giving feedback and listening    Possible barriers to participation / learning include: and no barriers identified    Health Issues:   Yes: Pain, Associated Psychological Distress       Substance Use Review:   Substance Use: No active concerns identified.    Mental Status/Behavioral Observations  Appearance:   Appropriate   Eye Contact:   Good   Psychomotor Behavior: Normal   Attitude:   Cooperative   Orientation:   All  Speech   Rate / Production: Normal    Volume:  Normal   Mood:    Depressed   Affect:    Appropriate   Thought Content:   Clear  Thought Form:  Coherent  Logical     Insight:    Good     Plan:     Safety Plan: No current safety concerns identified.  Recommended that patient call 911 or go to the local ED should there be a change in any of these risk factors.     Barriers to treatment: None identified    Patient Contracts (see media tab):  None    Substance Use: Not addressed in session     Continue or Discharge: Patient will continue in Adult Day Treatment (ADT)  as planned. Patient is likely to benefit from learning and using skills as they work toward the goals identified in their treatment plan.      Lenore French, [facility]  December 22, 2020

## 2020-12-22 NOTE — GROUP NOTE
Psychotherapy Group Note    PATIENT'S NAME: Joel Pineda  MRN:   [id number]  :   1975  ACCT. NUMBER: [id number]  DATE OF SERVICE: 20  START TIME: 10:00 AM  END TIME: 10:50 AM  FACILITATOR: Lenore French LPCC  TOPIC: MH EBP Group: Self-Awareness  [facility] Adult Mental Health Day Treatment  TRACK: 6A    NUMBER OF PARTICIPANTS: 4    Telemedicine Visit: The patient's condition can be safely assessed and treated via synchronous audio and visual telemedicine encounter.      Reason for Telemedicine Visit: Services only offered telehealth    Originating Site (Patient Location): Patient's home    Distant Site (Provider Location): Provider Remote Setting    Consent:  The patient/guardian has verbally consented to: the potential risks and benefits of telemedicine (video visit) versus in person care; bill my insurance or make self-payment for services provided; and responsibility for payment of non-covered services.     Mode of Communication:  Video Conference via Sequence    As the provider I attest to compliance with applicable laws and regulations related to telemedicine.      Summary of Group / Topics Discussed:  Self-Awareness: Values: Patients identified personal values by examining development of their current values and how their values influence their daily functioning and life choices. Patients explored the impact of their values on their thoughts, feelings, and actions. Patients discussed definition of personal values and how they develop and change over time. The goal is to help patients reconcile value conflicts and achieve balance and flexibility to improve mood and daily functioning.     Patient Session Goals / Objectives:    Examined development of values and impact of values on functioning    Identified and prioritized important values related to current well-being     Identified strategies to change or enhance values to positively impact symptoms    Assisted patients to find ways  to adapt functioning to better fit their values        Patient Participation / Response:  Fully participated with the group by sharing personal reflections / insights and openly received / provided feedback with other participants.    Demonstrated understanding of topics discussed through group discussion and participation, Demonstrated understanding of values, strengths, and challenges to learn about themselves and Identified / Expressed readiness to act intentionally, increase self-compassion, promote personal growth    Treatment Plan:  Patient has a current master individualized treatment plan.  See Epic treatment plan for more information.    Lenore French, [facility]

## 2020-12-22 NOTE — GROUP NOTE
Psychotherapy Group Note    PATIENT'S NAME: Joel Pineda  MRN:   [id number]  :   1975  ACCT. NUMBER: [id number]  DATE OF SERVICE: 20  START TIME: 11:00 AM  END TIME: 11:50 AM  FACILITATOR: Morenita Lau LPCC  TOPIC: MH EBP Group: Coping Skills  [facility] Adult Mental Health Day Treatment  TRACK: 6A    NUMBER OF PARTICIPANTS: 5    Summary of Group / Topics Discussed:  Coping Skills: Meditation: Patients learned about meditation and explored how and when to utilize it to increase focus, reduce mental health symptoms, decrease physical tension, and improve mental well-being.  Approaches to meditation were presented as a means of increasing self-awareness. The benefits of various meditation practices were discussed, as well as barriers to utilization of this coping strategy.     Patient Session Goals / Objectives:    Understand the purpose and efficacy of using meditation modalities to reduce stress / symptoms.    Review / discuss situations in daily life that cause distress, where establishing a meditation routine or meditating as needed may improve functioning.      Verbalize understanding of how and when to apply grounding strategies to reduce distress and increase presence in the moment.    Practice meditation and address barriers to use in daily life.    Telemedicine Visit: The patient's condition can be safely assessed and treated via synchronous audio and visual telemedicine encounter.      Reason for Telemedicine Visit: Services only offered telehealth and due to COVID-19    Originating Site (Patient Location): Patient's home    Distant Site (Provider Location): Provider Remote Setting    Consent:  The patient/guardian has verbally consented to: the potential risks and benefits of telemedicine (video visit) versus in person care; bill my insurance or make self-payment for services provided; and responsibility for payment of non-covered services.     Mode of Communication:  Video  Conference via RealityMine    As the provider I attest to compliance with applicable laws and regulations related to telemedicine.          Patient Participation / Response:  Fully participated with the group by sharing personal reflections / insights and openly received / provided feedback with other participants.    Demonstrated understanding of topics discussed through group discussion and participation, Expressed understanding of the relevance / importance of coping skills at distressing times in life and Demonstrated knowledge of when to consider using a variety of coping skills in daily life    Treatment Plan:  Patient has a current master individualized treatment plan.  See Epic treatment plan for more information.    Morenita Lau, [facility]

## 2020-12-23 ENCOUNTER — HOSPITAL ENCOUNTER (OUTPATIENT)
Dept: BEHAVIORAL HEALTH | Facility: CLINIC | Age: 45
End: 2020-12-23
Attending: PSYCHIATRY & NEUROLOGY
Payer: MEDICARE

## 2020-12-23 PROCEDURE — 90853 GROUP PSYCHOTHERAPY: CPT | Mod: GT | Performed by: COUNSELOR

## 2020-12-23 PROCEDURE — 90853 GROUP PSYCHOTHERAPY: CPT | Mod: PO | Performed by: COUNSELOR

## 2020-12-23 NOTE — GROUP NOTE
Psychotherapy Group Note    PATIENT'S NAME: Joel Pineda  MRN:   [id number]  :   1975  ACCT. NUMBER: [id number]  DATE OF SERVICE: 20  START TIME:  9:00 AM  END TIME:  9:50 AM  FACILITATOR: Morenita Lau LPCC  TOPIC: MH EBP Group: Coping Skills  [facility] Adult Mental Health Day Treatment  TRACK: 6A    NUMBER OF PARTICIPANTS: 2    Summary of Group / Topics Discussed:  Coping Skills: Additional Coping Skills:  Patients discussed and practiced unhealthy vs healthy coping strategies.  Reviewed the benefits of applying the aforementioned coping strategies.  Patients explored how these strategies might be applied to daily stressors or distressing situations.    Patient Session Goals / Objectives:    Understand the purpose and benefits of applying healthy coping strategies    Identify unhealthy versus healthy coping strategies    Address barriers to utilizing coping skills when in distress.    Telemedicine Visit: The patient's condition can be safely assessed and treated via synchronous audio and visual telemedicine encounter.      Reason for Telemedicine Visit: Services only offered telehealth and due to COVID-19    Originating Site (Patient Location): Patient's home    Distant Site (Provider Location): Provider Remote Setting    Consent:  The patient/guardian has verbally consented to: the potential risks and benefits of telemedicine (video visit) versus in person care; bill my insurance or make self-payment for services provided; and responsibility for payment of non-covered services.     Mode of Communication:  Video Conference via Mpax    As the provider I attest to compliance with applicable laws and regulations related to telemedicine.          Patient Participation / Response:  Fully participated with the group by sharing personal reflections / insights and openly received / provided feedback with other participants.    Demonstrated understanding of topics discussed through group  discussion and participation, Expressed understanding of the relevance / importance of coping skills at distressing times in life and Demonstrated knowledge of when to consider using a variety of coping skills in daily life    Treatment Plan:  Patient has a current master individualized treatment plan.  See Epic treatment plan for more information.    Morenita Lau, [facility]

## 2020-12-23 NOTE — GROUP NOTE
Psychotherapy Group Note    PATIENT'S NAME: Jeol Pineda  MRN:   [id number]  :   1975  ACCT. NUMBER: [id number]  DATE OF SERVICE: 20  START TIME: 11:00 AM  END TIME: 11:50 AM  FACILITATOR: Morenita Lau LPCC  TOPIC: MH EBP Group: Self-Awareness  [facility] Adult Mental Health Day Treatment  TRACK: 6A    NUMBER OF PARTICIPANTS: 5    Summary of Group / Topics Discussed:  Self-Awareness: Personal Strengths: Topic focused on assisting patients in identifying personal strengths and how they relate to the management of mental health symptoms. Patients discussed the benefits of acknowledging their personal strengths and their impact on mood improvement, mindfulness, and perspective. Patients worked to increase time spent on recognition and appreciation of what is positive and working in their lives. The goal is to reduce rumination and negative thinking resulting in increased mindfulness and resilience. Patients will work to put skills into practice and problem-solve barriers.     Patient Session Goals / Objectives:    Identified personal strengths    Identified barriers to recognition of personal strengths    Verbalized understanding of strategies to increase use of their strengths in management of daily symptoms    Telemedicine Visit: The patient's condition can be safely assessed and treated via synchronous audio and visual telemedicine encounter.      Reason for Telemedicine Visit: Services only offered telehealth and due to COVID-19    Originating Site (Patient Location): Patient's home    Distant Site (Provider Location): Provider Remote Setting    Consent:  The patient/guardian has verbally consented to: the potential risks and benefits of telemedicine (video visit) versus in person care; bill my insurance or make self-payment for services provided; and responsibility for payment of non-covered services.     Mode of Communication:  Video Conference via [employer]    As the provider I  attest to compliance with applicable laws and regulations related to telemedicine.        Patient Participation / Response:  Fully participated with the group by sharing personal reflections / insights and openly received / provided feedback with other participants.    Demonstrated understanding of topics discussed through group discussion and participation, Demonstrated understanding of values, strengths, and challenges to learn about themselves and Identified / Expressed readiness to act intentionally, increase self-compassion, promote personal growth    Treatment Plan:  Patient has a current master individualized treatment plan.  See Epic treatment plan for more information.    Morenita Lau, [facility]
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
## 2020-12-26 DIAGNOSIS — G43.109 MIGRAINE WITH AURA AND WITHOUT STATUS MIGRAINOSUS, NOT INTRACTABLE: ICD-10-CM

## 2020-12-28 ENCOUNTER — THERAPY VISIT (OUTPATIENT)
Dept: PHYSICAL THERAPY | Facility: CLINIC | Age: 45
End: 2020-12-28
Payer: MEDICARE

## 2020-12-28 ENCOUNTER — MYC REFILL (OUTPATIENT)
Dept: FAMILY MEDICINE | Facility: CLINIC | Age: 45
End: 2020-12-28

## 2020-12-28 DIAGNOSIS — M51.369 DDD (DEGENERATIVE DISC DISEASE), LUMBAR: ICD-10-CM

## 2020-12-28 DIAGNOSIS — M45.8 ANKYLOSING SPONDYLITIS OF SACRAL REGION (H): ICD-10-CM

## 2020-12-28 DIAGNOSIS — M79.671 RIGHT FOOT PAIN: ICD-10-CM

## 2020-12-28 PROCEDURE — 97035 APP MDLTY 1+ULTRASOUND EA 15: CPT | Mod: GP | Performed by: PHYSICAL THERAPIST

## 2020-12-28 PROCEDURE — 97140 MANUAL THERAPY 1/> REGIONS: CPT | Mod: GP | Performed by: PHYSICAL THERAPIST

## 2020-12-28 RX ORDER — RIZATRIPTAN BENZOATE 5 MG/1
TABLET, ORALLY DISINTEGRATING ORAL
Qty: 30 TABLET | Refills: 0 | Status: SHIPPED | OUTPATIENT
Start: 2020-12-28 | End: 2021-08-30

## 2020-12-28 NOTE — TELEPHONE ENCOUNTER
Routing refill request to provider for review/approval because:  Provider to review        Martell Chaves RN, BSN, PHN

## 2020-12-29 ENCOUNTER — HOSPITAL ENCOUNTER (OUTPATIENT)
Dept: BEHAVIORAL HEALTH | Facility: CLINIC | Age: 45
End: 2020-12-29
Attending: PSYCHIATRY & NEUROLOGY
Payer: MEDICARE

## 2020-12-29 PROCEDURE — 90853 GROUP PSYCHOTHERAPY: CPT | Mod: GT | Performed by: COUNSELOR

## 2020-12-29 RX ORDER — OXYCODONE HYDROCHLORIDE 5 MG/1
5-10 TABLET ORAL EVERY 6 HOURS PRN
Qty: 35 TABLET | Refills: 0 | Status: SHIPPED | OUTPATIENT
Start: 2020-12-29 | End: 2021-01-18

## 2020-12-29 NOTE — TELEPHONE ENCOUNTER
Routing refill request to provider for review/approval because:  Drug not on the [facility] refill protocol     Controlled Substance Refill Request for Oxycodone  Problem List Complete:  Yes   checked in past 3 months?  Yes 12/2/20     Patient is followed by Ksenia Lyles MD for ongoing prescription of pain medication.  All refills should only be approved by this provider, or covering partner.     Medication(s): oxy 5.   Maximum quantity per month: 40  Clinic visit frequency required: Q3  months      Controlled substance agreement: 6/23/2020  UDS: Aug 2019     Encounter-Level CSA - 04/04/2017:            Controlled Substance Agreement - Scan on 4/11/2017  1:30 PM : CONTROLLED SUBSTANCE AGREEMENT (below)                   Pain Clinic evaluation in the past: Yes     DIRE Total Score(s):  No flowsheet data found.     Last [place] website verification:  12/2/2020      RX [place] program ([place]) reviewed:  not reviewed/not due - last done on 12/2/20    [place]:  [email or web address]

## 2020-12-29 NOTE — GROUP NOTE
January 13, 2017     Shola Scott MD  [address] [facility]  [address]  [facility] [address]    Patient: Peggy Kent   YOB: 1968   Date of Visit: 1/13/2017       Dear Dr. Tyler MD:    Thank you for referring Peggy Kent to me for evaluation. Below are the relevant portions of my assessment and plan of care.    If you have questions, please do not hesitate to call me. I look forward to following Peggy along with you.         Sincerely,        Stuart Cocharn MD        CC: No Recipients  Stuart Cochran MD  1/13/2017 12:10 PM  Sign at close encounter  Peggy Kent  1968  [phone]  [phone]    01/13/17      [facility] CARDIOLOGY    Shola Scott MD  [address] [facility] [address] / [facility] [address]    Chief Complaint   Patient presents with   • Slow Heart Rate     Problem List:     1. Bradycardia   A. Echocardiogram 4/12/14: EF 55-60%, no significant valvular abnormalities   B. EKG 2014: sinus bradycardia 40 bpm   C. Apparent normal stress test - Dr. Jose > 5 years ago  2. Vasovagal Syncope   A. Syncopal episode in 2014- felt to be VVS exacerbated by dehydration   3. Varicose veins s/p vein stripping - [facility]  4. Hearing loss  5. History of Cipro induced myalgias and chronic pain  6. History of benign breast tumors  7. S/p wisdom teeth removal  8. D and C    History of Present Illness:   Ms Kent is referred to our care by Dr. Scott for bradycardia. She states that for years, her heart rate is in the 30s and 40s. She is a  for a living and is in very good shape. She works out heavily and can dead lift 200 lbs, do hour boot camp classes, and etc.  She does complain of being fatigued but is still able to do all of these activities. Sometimes, she states when she breathes in deep she feels a heaviness but does not have this with exertion. She was hospitalized in 2014 after a syncopal episode that was determined to be vasovagal syncope  Psychotherapy Group Note    PATIENT'S NAME: Joel Pineda  MRN:   [id number]  :   1975  ACCT. NUMBER: [id number]  DATE OF SERVICE: 20  START TIME: 11:00 AM  END TIME: 11:50 AM  FACILITATOR: Morenita Lau LPCC  TOPIC: MH EBP Group: Emotions Management  [facility] Adult Mental Health Day Treatment  TRACK: 6A    NUMBER OF PARTICIPANTS: 7    Summary of Group / Topics Discussed:  Emotions Management: Understanding Emotions: Patients discussed the purpose of emotions and function they serve in our lives.  Reviewed core emotions, why they happen (triggers), and how they occur. The group assisted one anothers' understanding that: emotional experiences are important; difficult emotions have a place in our lives; and the differences between various emotions.    Patient Session Goals / Objectives:    Demonstrate understanding of types various emotions    Identify and discuss specific emotions and when they occur; understand triggers    Discuss barriers to emotional regulation    Telemedicine Visit: The patient's condition can be safely assessed and treated via synchronous audio and visual telemedicine encounter.      Reason for Telemedicine Visit: Services only offered telehealth and due to COVID-19    Originating Site (Patient Location): Patient's home    Distant Site (Provider Location): Provider Remote Setting    Consent:  The patient/guardian has verbally consented to: the potential risks and benefits of telemedicine (video visit) versus in person care; bill my insurance or make self-payment for services provided; and responsibility for payment of non-covered services.     Mode of Communication:  Video Conference via [employer]    As the provider I attest to compliance with applicable laws and regulations related to telemedicine.        Patient Participation / Response:  Fully participated with the group by sharing personal reflections / insights and openly received / provided feedback with other  "in the setting of dehydrated.  She has not had any further episodes since then, but occasionally has dizzy spells. Her BP is usually in the 90s systolic. She has not had a tilt table test or worn an event/holter monitor.  She is not sure when her thyroid was checked, in 2014 it was normal.     Allergies   Allergen Reactions   • Ciprofloxacin         Cannot display prior to admission medications because the patient has not been admitted in this contact.            Current Outpatient Prescriptions:   •  fexofenadine-pseudoephedrine (ALLEGRA-D 24) 180-240 MG per 24 hr tablet, Take 1 tablet by mouth As Needed for allergies., Disp: , Rfl:   •  ibuprofen (ADVIL,MOTRIN) 200 MG tablet, Take 200 mg by mouth As Needed for mild pain (1-3)., Disp: , Rfl:     Social History     Social History   • Marital status:      Spouse name: N/A   • Number of children: N/A   • Years of education: N/A     Social History Main Topics   • Smoking status: Never Smoker   • Smokeless tobacco: Never Used   • Alcohol use No   • Drug use: None   • Sexual activity: Defer     Other Topics Concern   • None     Social History Narrative       Family History   Problem Relation Age of Onset   • Heart disease Mother    • Hypertension Mother    • Heart disease Father    • Hypertension Father    Both parents had heart disease. Father had a MI, he was a smoker. Mother has congestive CHF    Review of Systems: Pertinent positives noted above in the HPI and problem list.  All other reviewed systems negative.     Vitals:    01/13/17 1105   BP: 102/70   BP Location: Right arm   Patient Position: Sitting   Pulse: 62   Weight: 147 lb (66.7 kg)   Height: 72\" (182.9 cm)       Physical Exam:  GEN: Well nourished, Well- developed  No acute distress  HEENT: Normocephalic, Atraumatic, PERRLA, moist mucous membranes  NECK: supple, NO JVD, no thyromegaly, no lymphadenopathy  CARD: S1S2  RRR no murmur, gallop, rub  LUNGS: Clear to auscultataion, normal respiratory " participants.    Expressed understanding of the relevance / importance of emotions management skills at distressing times in life and Self-aware of experiences with difficult emotions, and strategies to employ to manage them    Treatment Plan:  Patient has a current master individualized treatment plan.  See Epic treatment plan for more information.    Morenita Lau, [facility]   effort  ABDOMEN: Soft, nontender, normal bowel sounds  EXTREMITIES:No gross deformities,  No clubbing, cyanosis, or edema  SKIN: Warm, dry  NEURO: No focal deficits  PSYCHIATRIC: Normal affect and mood      Data:                                      ECG 12 Lead  Date/Time: 2017 11:29 AM  Performed by: PAULETTE COCHRAN  Authorized by: PAULETTE COCHRAN   Rhythm: sinus rhythm  BPM: 62            EK Marked Sinus bradycardia 40 bpm      Assessment and Plan:   1. Bradycardia    Bradycardia with rates as low as 40 bpm in a very active/fit female. She has some mild fatigue that may or may not be related to her heart rates. She is not any medications that would slow her heart rate down. Dr. Cochran has had a long discussion with the patient about bradycardia.  We would like for her to wear a Zio patch event monitor to look at her heart rates during activity/exercise to see if she can reach her target heart rate and rule out chronotropic incompetence.   2. Vasovagal syncope- recommend hydration               Rosenda Shrestha PA-C scribing for Paulette Smith. MD Dimas, personally performed the services described in this documentation as scribed by the above named individual in my presence, and it is both accurate and complete.  2017  12:10 PM

## 2020-12-29 NOTE — GROUP NOTE
"Process Group Note    PATIENT'S NAME: Joel Pineda  MRN:   [id number]  :   1975  ACCT. NUMBER: [id number]  DATE OF SERVICE: 20  START TIME:  9:00 AM  END TIME:  9:50 AM  FACILITATOR: Lenore French [facility]  TOPIC:  Process Group    Diagnoses:  296.33 (F33.2) Major Depressive Disorder, Recurrent Episode, Severe _.  4. Other Diagnoses that is relevant to services:   300.00 (F41.9) Unspecified Anxiety Disorder  301.83 (F60.3) Borderline Personality Disorder.      [facility] Mental Health Day Treatment  TRACK: 6A    NUMBER OF PARTICIPANTS: 8    Telemedicine Visit: The patient's condition can be safely assessed and treated via synchronous audio and visual telemedicine encounter.      Reason for Telemedicine Visit: Services only offered telehealth    Originating Site (Patient Location): Patient's home    Distant Site (Provider Location): Provider Remote Setting    Consent:  The patient/guardian has verbally consented to: the potential risks and benefits of telemedicine (video visit) versus in person care; bill my insurance or make self-payment for services provided; and responsibility for payment of non-covered services.     Mode of Communication:  Video Conference via [employer]    As the provider I attest to compliance with applicable laws and regulations related to telemedicine.            Data:    Session content: At the start of this group, patients were invited to check in by identifying themselves, describing their current emotional status, and identifying issues to address in this group.   Area(s) of treatment focus addressed in this session included Symptom Management and Personal Safety.    Tito reported feeling \"better than the weekend\" today.  He reported that he was bummed he didn't get to see his sister much when she was in town due to the blizzard, which disappointed him.  He reported that he has been doing a good job of avoiding triggers that lead to SIB, which he was " grateful for.  Client declined additional process time but contributed to group discussion and provided feedback and support to peers.      Therapeutic Interventions/Treatment Strategies:  Psychotherapist offered support, feedback and validation.    Assessment:    Patient response:   Patient responded to session by accepting feedback, giving feedback and listening    Possible barriers to participation / learning include: and no barriers identified    Health Issues:   Yes: Pain, Associated Psychological Distress       Substance Use Review:   Substance Use: No active concerns identified.    Mental Status/Behavioral Observations  Appearance:   Appropriate   Eye Contact:   Good   Psychomotor Behavior: Normal   Attitude:   Cooperative   Orientation:   All  Speech   Rate / Production: Normal    Volume:  Normal   Mood:    Depressed   Affect:    Appropriate   Thought Content:   Clear  Thought Form:  Coherent  Logical     Insight:    Good     Plan:     Safety Plan: No current safety concerns identified.  Recommended that patient call 911 or go to the local ED should there be a change in any of these risk factors.     Barriers to treatment: None identified    Patient Contracts (see media tab):  None    Substance Use: Not addressed in session     Continue or Discharge: Patient will continue in Adult Day Treatment (ADT)  as planned. Patient is likely to benefit from learning and using skills as they work toward the goals identified in their treatment plan.      Lenore French, [facility]  December 29, 2020

## 2020-12-29 NOTE — GROUP NOTE
Psychotherapy Group Note    PATIENT'S NAME: Joel Pineda  MRN:   [id number]  :   1975  ACCT. NUMBER: [id number]  DATE OF SERVICE: 20  START TIME: 10:00 AM  END TIME: 10:50 AM  FACILITATOR: Lenore French LPCC  TOPIC: MH EBP Group: Emotions Management  [facility] Mental Health Day Treatment  TRACK: 1B    NUMBER OF PARTICIPANTS: 8    Telemedicine Visit: The patient's condition can be safely assessed and treated via synchronous audio and visual telemedicine encounter.      Reason for Telemedicine Visit: Services only offered telehealth    Originating Site (Patient Location): Patient's home    Distant Site (Provider Location): Provider Remote Setting    Consent:  The patient/guardian has verbally consented to: the potential risks and benefits of telemedicine (video visit) versus in person care; bill my insurance or make self-payment for services provided; and responsibility for payment of non-covered services.     Mode of Communication:  Video Conference via [employer]    As the provider I attest to compliance with applicable laws and regulations related to telemedicine.      Summary of Group / Topics Discussed:  Emotions Management: Understanding Emotions: Patients discussed the purpose of emotions and function they serve in our lives.  Reviewed core emotions, why they happen (triggers), and how they occur. The group assisted one anothers' understanding that: emotional experiences are important; difficult emotions have a place in our lives; and the differences between various emotions.    Patient Session Goals / Objectives:    Demonstrate understanding of types various emotions    Identify and discuss specific emotions and when they occur; understand triggers    Discuss barriers to emotional regulation      Patient Participation / Response:  Fully participated with the group by sharing personal reflections / insights and openly received / provided feedback with other  participants.    Demonstrated understanding of topics discussed through group discussion and participation, Expressed understanding of the relevance / importance of emotions management skills at distressing times in life, Self-aware of experiences with difficult emotions, and strategies to employ to manage them and Demonstrated knowledge of when to consider applying a variety of emotions management skills in daily life    Treatment Plan:  Patient has a current master individualized treatment plan.  See Epic treatment plan for more information.    Lenore French, [facility]

## 2020-12-30 ENCOUNTER — HOSPITAL ENCOUNTER (OUTPATIENT)
Dept: BEHAVIORAL HEALTH | Facility: CLINIC | Age: 45
End: 2020-12-30
Attending: PSYCHIATRY & NEUROLOGY
Payer: MEDICARE

## 2020-12-30 ENCOUNTER — HOSPITAL ENCOUNTER (OUTPATIENT)
Dept: BEHAVIORAL HEALTH | Facility: CLINIC | Age: 45
Discharge: HOME | End: 2020-12-30
Attending: PSYCHIATRY & NEUROLOGY | Admitting: PSYCHIATRY & NEUROLOGY
Payer: MEDICARE

## 2020-12-30 PROCEDURE — 90853 GROUP PSYCHOTHERAPY: CPT | Mod: GT | Performed by: COUNSELOR

## 2020-12-30 PROCEDURE — 99214 OFFICE O/P EST MOD 30 MIN: CPT | Mod: 95 | Performed by: PSYCHIATRY & NEUROLOGY

## 2020-12-30 PROCEDURE — 90853 GROUP PSYCHOTHERAPY: CPT | Mod: PO | Performed by: COUNSELOR

## 2020-12-30 NOTE — GROUP NOTE
"Process Group Note    PATIENT'S NAME: Joel Pineda  MRN:   [id number]  :   1975  ACCT. NUMBER: [id number]  DATE OF SERVICE: 20  START TIME: 10:00 AM  END TIME: 10:50 AM  FACILITATOR: Lenore French [facility]  TOPIC:  Process Group    Diagnoses:  296.33 (F33.2) Major Depressive Disorder, Recurrent Episode, Severe _.  4. Other Diagnoses that is relevant to services:   300.00 (F41.9) Unspecified Anxiety Disorder  301.83 (F60.3) Borderline Personality Disorder.      [facility] Mental Health Day Treatment  TRACK: 6A    NUMBER OF PARTICIPANTS: 8    Telemedicine Visit: The patient's condition can be safely assessed and treated via synchronous audio and visual telemedicine encounter.      Reason for Telemedicine Visit: Services only offered telehealth    Originating Site (Patient Location): Patient's home    Distant Site (Provider Location): Provider Remote Setting    Consent:  The patient/guardian has verbally consented to: the potential risks and benefits of telemedicine (video visit) versus in person care; bill my insurance or make self-payment for services provided; and responsibility for payment of non-covered services.     Mode of Communication:  Video Conference via [employer]    As the provider I attest to compliance with applicable laws and regulations related to telemedicine.          Data:    Session content: At the start of this group, patients were invited to check in by identifying themselves, describing their current emotional status, and identifying issues to address in this group.   Area(s) of treatment focus addressed in this session included Symptom Management and Personal Safety.    Tito reported feeling \"really tired\" today.  His goal for the day is to listen to some youtube affirmations.  He may also take a car trip to the pharmacy to fill all his prescriptions before the end of the year.  Client declined additional process time but contributed to group discussion and " provided feedback and support to peers.      Therapeutic Interventions/Treatment Strategies:  Psychotherapist offered support, feedback and validation.    Assessment:    Patient response:   Patient responded to session by accepting feedback, giving feedback and listening    Possible barriers to participation / learning include: and no barriers identified    Health Issues:   None reported       Substance Use Review:   Substance Use: No active concerns identified.    Mental Status/Behavioral Observations  Appearance:   Appropriate   Eye Contact:   Good   Psychomotor Behavior: Normal   Attitude:   Cooperative   Orientation:   All  Speech   Rate / Production: Normal    Volume:  Normal   Mood:    Depressed   Affect:    Appropriate   Thought Content:   Clear  Thought Form:  Coherent  Logical     Insight:    Good     Plan:     Safety Plan: No current safety concerns identified.  Recommended that patient call 911 or go to the local ED should there be a change in any of these risk factors.     Barriers to treatment: None identified    Patient Contracts (see media tab):  None    Substance Use: Provided encouragement towards sobriety    Provided support and affirmation for steps taken towards sobriety      Continue or Discharge: Patient will continue in Adult Day Treatment (ADT)  as planned. Patient is likely to benefit from learning and using skills as they work toward the goals identified in their treatment plan.      Lenore French, [facility]  December 30, 2020
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
## 2020-12-30 NOTE — GROUP NOTE
Psychotherapy Group Note    PATIENT'S NAME: Joel Pineda  MRN:   [id number]  :   1975  ACCT. NUMBER: [id number]  DATE OF SERVICE: 20  START TIME: 11:00 AM  END TIME: 11:50 AM  FACILITATOR: Morenita Lau LPCC  TOPIC: MH EBP Group: Emotions Management  [facility] Mental Health Day Treatment  TRACK: 6A    NUMBER OF PARTICIPANTS: 9    Summary of Group / Topics Discussed:  Emotions Management: Opposite to Emotion: Patients discussed past and present struggles with knowing how to make changes in their lives due to difficult emotional experiences.  Explored desires to experience and feel less anger, sadness, guilt, and fear.  Reviewed the therapeutic skill of opposite action and patients explored opportunities to use their behaviors as a tool to reduce an emotion that they want to change.     Patient Session Goals / Objectives:    Review DBT concepts and focus on patient s experiences of distress and difficult emotional experiences.    Learn how to do the opposite of what an emotion makes us want to do in an effort to decrease an unwanted emotional experience.    Demonstrate understanding of the skill of opposite action by sharing experiences where the technique could be useful in past / present situations.    Telemedicine Visit: The patient's condition can be safely assessed and treated via synchronous audio and visual telemedicine encounter.      Reason for Telemedicine Visit: Services only offered telehealth and due to COVID-19    Originating Site (Patient Location): Patient's home    Distant Site (Provider Location): Provider Remote Setting    Consent:  The patient/guardian has verbally consented to: the potential risks and benefits of telemedicine (video visit) versus in person care; bill my insurance or make self-payment for services provided; and responsibility for payment of non-covered services.     Mode of Communication:  Video Conference via [employer]    As the provider I  attest to compliance with applicable laws and regulations related to telemedicine.        Patient Participation / Response:  Fully participated with the group by sharing personal reflections / insights and openly received / provided feedback with other participants.    Demonstrated understanding of topics discussed through group discussion and participation, Expressed understanding of the relevance / importance of emotions management skills at distressing times in life and Self-aware of experiences with difficult emotions, and strategies to employ to manage them    Treatment Plan:  Patient has a current master individualized treatment plan.  See Epic treatment plan for more information.    Morenita Lau, [facility]

## 2020-12-30 NOTE — GROUP NOTE
Psychotherapy Group Note    PATIENT'S NAME: Joel Pineda  MRN:   [id number]  :   1975  ACCT. NUMBER: [id number]  DATE OF SERVICE: 20  START TIME:  9:00 AM  END TIME:  9:50 AM  FACILITATOR: Morenita Lau LPCC  TOPIC: MH EBP Group: Emotions Management  [facility] Adult Mental Health Day Treatment  TRACK: 6A    NUMBER OF PARTICIPANTS: 9    Summary of Group / Topics Discussed:  Emotions Management: Anger: Patients explored and shared personal experiences associated with feelings of anger.  Group explored how these feelings develop, what they mean to each individual, and how to increase acceptance and usefulness of these feelings.  Discussed anger as a  secondary  emotion and reviewed ways to manage anger and challenge associated cognitive distortions. Group members worked to contextualize these concepts and promote healing.     Patient Session Goals / Objectives:    Discuss and review definitions and personal views/experiences with anger    Explore how feelings of anger impact functioning    Understand and practice strategies to manage difficult emotions and move towards healing    Demonstrate understanding of the feelings of anger    Verbalize how these emotions have impacted their lives/functioning    Verbalize of knowledge gained and possible interventions to manage feelings    Telemedicine Visit: The patient's condition can be safely assessed and treated via synchronous audio and visual telemedicine encounter.      Reason for Telemedicine Visit: Services only offered telehealth and due to COVID-19    Originating Site (Patient Location): Patient's home    Distant Site (Provider Location): Provider Remote Setting    Consent:  The patient/guardian has verbally consented to: the potential risks and benefits of telemedicine (video visit) versus in person care; bill my insurance or make self-payment for services provided; and responsibility for payment of non-covered services.     Mode of  Communication:  Video Conference via [employer]    As the provider I attest to compliance with applicable laws and regulations related to telemedicine.        Patient Participation / Response:  Fully participated with the group by sharing personal reflections / insights and openly received / provided feedback with other participants.    Demonstrated understanding of topics discussed through group discussion and participation, Expressed understanding of the relevance / importance of emotions management skills at distressing times in life and Self-aware of experiences with difficult emotions, and strategies to employ to manage them    Treatment Plan:  Patient has a current master individualized treatment plan.  See Epic treatment plan for more information.    Morenita Lau, [facility]

## 2021-01-05 ENCOUNTER — HOSPITAL ENCOUNTER (OUTPATIENT)
Dept: BEHAVIORAL HEALTH | Facility: CLINIC | Age: 46
End: 2021-01-05
Attending: PSYCHIATRY & NEUROLOGY
Payer: MEDICARE

## 2021-01-05 ENCOUNTER — MYC MEDICAL ADVICE (OUTPATIENT)
Dept: PALLIATIVE MEDICINE | Facility: CLINIC | Age: 46
End: 2021-01-05

## 2021-01-05 PROCEDURE — G0177 OPPS/PHP; TRAIN & EDUC SERV: HCPCS | Mod: GT | Performed by: COUNSELOR

## 2021-01-05 PROCEDURE — 90853 GROUP PSYCHOTHERAPY: CPT | Mod: PO | Performed by: COUNSELOR

## 2021-01-05 PROCEDURE — 90853 GROUP PSYCHOTHERAPY: CPT | Mod: GT | Performed by: COUNSELOR

## 2021-01-05 NOTE — GROUP NOTE
"Process Group Note    PATIENT'S NAME: Joel Pineda  MRN:   [id number]  :   1975  ACCT. NUMBER: [id number]  DATE OF SERVICE: 21  START TIME:  9:00 AM  END TIME:  9:50 AM  FACILITATOR: Lenore French [facility]  TOPIC:  Process Group    Diagnoses:  296.33 (F33.2) Major Depressive Disorder, Recurrent Episode, Severe _.  4. Other Diagnoses that is relevant to services:   300.00 (F41.9) Unspecified Anxiety Disorder  301.83 (F60.3) Borderline Personality Disorder.      [facility] Mental Health Day Treatment  TRACK: 6A    NUMBER OF PARTICIPANTS: 4    Telemedicine Visit: The patient's condition can be safely assessed and treated via synchronous audio and visual telemedicine encounter.      Reason for Telemedicine Visit: Services only offered telehealth    Originating Site (Patient Location): Patient's home    Distant Site (Provider Location): Provider Remote Setting    Consent:  The patient/guardian has verbally consented to: the potential risks and benefits of telemedicine (video visit) versus in person care; bill my insurance or make self-payment for services provided; and responsibility for payment of non-covered services.     Mode of Communication:  Video Conference via [employer] ([employer]    As the provider I attest to compliance with applicable laws and regulations related to telemedicine.          Data:    Session content: At the start of this group, patients were invited to check in by identifying themselves, describing their current emotional status, and identifying issues to address in this group.   Area(s) of treatment focus addressed in this session included Symptom Management and Personal Safety.    Tito reported feeling like a \"mixed bad\" of emotions today.  Her goal for the day is to watch some political news.  He took some process time to discuss his feelings about his step-mom, who has recently been diagnosed with dementia.  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      Therapeutic Interventions/Treatment Strategies:  Psychotherapist offered support, feedback and validation and reinforced use of skills. Treatment modalities used include Motivational Interviewing, Cognitive Behavioral Therapy and Dialectical Behavioral Therapy. Interventions include Relationship Skills: Encouraged development and maintenance  of healthy boundaries.    Assessment:    Patient response:   Patient responded to session by accepting feedback, giving feedback and listening    Possible barriers to participation / learning include: and no barriers identified    Health Issues:   None reported       Substance Use Review:   Substance Use: No active concerns identified.    Mental Status/Behavioral Observations  Appearance:   Appropriate   Eye Contact:   Good   Psychomotor Behavior: Normal   Attitude:   Cooperative   Orientation:   All  Speech   Rate / Production: Normal    Volume:  Normal   Mood:    Depressed   Affect:    Appropriate   Thought Content:   Clear  Thought Form:  Coherent  Logical     Insight:    Good     Plan:     Safety Plan: No current safety concerns identified.  Recommended that patient call 911 or go to the local ED should there be a change in any of these risk factors.     Barriers to treatment: None identified    Patient Contracts (see media tab):  None    Substance Use: Not addressed in session     Continue or Discharge: Patient will continue in Adult Day Treatment (ADT)  as planned. Patient is likely to benefit from learning and using skills as they work toward the goals identified in their treatment plan.      Lenore rFench, [facility]  January 5, 2021

## 2021-01-05 NOTE — GROUP NOTE
Psychotherapy Group Note    PATIENT'S NAME: Joel Pineda  MRN:   [id number]  :   1975  ACCT. NUMBER: [id number]  DATE OF SERVICE: 21  START TIME: 11:00 AM  END TIME: 11:50 AM  FACILITATOR: Morenita Lau LPCC  TOPIC: MH EBP Group: Coping Skills  [facility] Adult Mental Health Day Treatment  TRACK: 6A    NUMBER OF PARTICIPANTS: 4    Summary of Group / Topics Discussed:  Coping Skills: Meditation: Patients learned about meditation and explored how and when to utilize it to increase focus, reduce mental health symptoms, decrease physical tension, and improve mental well-being.  Approaches to meditation were presented as a means of increasing self-awareness. The benefits of various meditation practices were discussed, as well as barriers to utilization of this coping strategy.     Patient Session Goals / Objectives:    Understand the purpose and efficacy of using meditation modalities to reduce stress / symptoms.    Review / discuss situations in daily life that cause distress, where establishing a meditation routine or meditating as needed may improve functioning.      Verbalize understanding of how and when to apply grounding strategies to reduce distress and increase presence in the moment.    Practice meditation and address barriers to use in daily life.    Telemedicine Visit: The patient's condition can be safely assessed and treated via synchronous audio and visual telemedicine encounter.      Reason for Telemedicine Visit: Services only offered telehealth and due to COVID-19    Originating Site (Patient Location): Patient's home    Distant Site (Provider Location): Provider Remote Setting    Consent:  The patient/guardian has verbally consented to: the potential risks and benefits of telemedicine (video visit) versus in person care; bill my insurance or make self-payment for services provided; and responsibility for payment of non-covered services.     Mode of Communication:  Video  Conference via [employer]ment    As the provider I attest to compliance with applicable laws and regulations related to telemedicine.        Patient Participation / Response:  Fully participated with the group by sharing personal reflections / insights and openly received / provided feedback with other participants.    Demonstrated understanding of topics discussed through group discussion and participation, Expressed understanding of the relevance / importance of coping skills at distressing times in life and Demonstrated knowledge of when to consider using a variety of coping skills in daily life    Treatment Plan:  Patient has a current master individualized treatment plan.  See Epic treatment plan for more information.    Morenita Lau, [facility]

## 2021-01-05 NOTE — GROUP NOTE
Psychotherapy Group Note    PATIENT'S NAME: Joel Pineda  MRN:   [id number]  :   1975  [id number]  DATE OF SERVICE: 21  START TIME: 10:00 AM  END TIME: 10:50 AM  FACILITATOR: Lenore French LPCC  TOPIC:  EBP Group: Emotions Management  [facility] Mental Health Day Treatment  TRACK: 6A    NUMBER OF PARTICIPANTS: 4    Telemedicine Visit: The patient's condition can be safely assessed and treated via synchronous audio and visual telemedicine encounter.      Reason for Telemedicine Visit: Services only offered telehealth    Originating Site (Patient Location): Patient's home    Distant Site (Provider Location): Provider Remote Setting    Consent:  The patient/guardian has verbally consented to: the potential risks and benefits of telemedicine (video visit) versus in person care; bill my insurance or make self-payment for services provided; and responsibility for payment of non-covered services.     Mode of Communication:  Video Conference via [employer]    As the provider I attest to compliance with applicable laws and regulations related to telemedicine.      Summary of Group / Topics Discussed:  Emotions Management: Guilt and Shame: Patients explored and shared personal experiences associated with feelings of guilt and shame.  Group explored how these feelings develop, what they mean to each individual, and how to increase acceptance and usefulness of these feelings.  Group members assisted one another to contextualize these concepts and promote healing.     Patient Session Goals / Objectives:    Discuss and review definitions and personal views/experiences with guilt and shame    Understand the differences between guilt and shame    Explore how feelings of guilt and shame impact functioning    Understand and practice strategies to manage difficult emotions and move towards healing    Understand and normalize difficult emotions through group discussion    Understand the  utility of guilt and shame    Target  unwanted  emotions for change      Patient Participation / Response:  Fully participated with the group by sharing personal reflections / insights and openly received / provided feedback with other participants.    Demonstrated understanding of topics discussed through group discussion and participation, Expressed understanding of the relevance / importance of emotions management skills at distressing times in life, Self-aware of experiences with difficult emotions, and strategies to employ to manage them, Demonstrated knowledge of when to consider applying a variety of emotions management skills in daily life and Demonstrated understanding and practice strategies to manage difficult emotions and move towards healing    Treatment Plan:  Patient has a current master individualized treatment plan.  See Epic treatment plan for more information.    Lenore French, [facility]

## 2021-01-05 NOTE — ADDENDUM NOTE
Encounter addended by: Lenore French [facility] on: 1/5/2021 3:17 PM   Actions taken: Charge Capture section accepted

## 2021-01-06 ENCOUNTER — HOSPITAL ENCOUNTER (OUTPATIENT)
Dept: BEHAVIORAL HEALTH | Facility: CLINIC | Age: 46
End: 2021-01-06
Attending: PSYCHIATRY & NEUROLOGY
Payer: MEDICARE

## 2021-01-06 PROCEDURE — 90853 GROUP PSYCHOTHERAPY: CPT | Mod: PO | Performed by: COUNSELOR

## 2021-01-06 NOTE — GROUP NOTE
Psychotherapy Group Note    PATIENT'S NAME: Joel Pineda  MRN:   [id number]  :   1975  ACCT. NUMBER: [id number]  DATE OF SERVICE: 21  START TIME: 11:00 AM  END TIME: 11:50 AM  FACILITATOR: Morenita Lau LPCC  TOPIC: MH EBP Group: Cognitive Restructuring  [facility] Adult Mental Health Day Treatment  TRACK: 6A    NUMBER OF PARTICIPANTS: 3    Summary of Group / Topics Discussed:  Cognitive Restructuring: Core Beliefs: Patients received an overview of what a core belief is, and how they develop. Patients then began to identify their negative core beliefs. Patients worked to modify core beliefs with the goal of improved self-image and functioning.     Patient Session Goals / Objectives:    Familiarize self with the concept of core beliefs and how they develop.      Explore personal core beliefs (positive and negative)    Develop / advance recognition of the connection between negative thoughts and negative core beliefs.    Formulate new neutral/positive core beliefs    Telemedicine Visit: The patient's condition can be safely assessed and treated via synchronous audio and visual telemedicine encounter.      Reason for Telemedicine Visit: Services only offered telehealth and due to COVID-19    Originating Site (Patient Location): Patient's home    Distant Site (Provider Location): Provider Remote Setting    Consent:  The patient/guardian has verbally consented to: the potential risks and benefits of telemedicine (video visit) versus in person care; bill my insurance or make self-payment for services provided; and responsibility for payment of non-covered services.     Mode of Communication:  Video Conference via [employer]    As the provider I attest to compliance with applicable laws and regulations related to telemedicine.           Patient Participation / Response:  Fully participated with the group by sharing personal reflections / insights and openly received / provided feedback with other  participants.    Demonstrated understanding of topics discussed through group discussion and participation, Expressed understanding of the relationship between behaviors, thoughts, and feelings and Demonstrated knowledge of personal thought patterns and how they impact their mood and behavior.    Treatment Plan:  Patient has a current master individualized treatment plan.  See Epic treatment plan for more information.    Morenita Lau, [facility]

## 2021-01-06 NOTE — TELEPHONE ENCOUNTER
To: PAIN NURSE      From: Tito Pineda      Created: 1/5/2021 11:55 AM        Dr. Diaz,     Good afternoon. I'm considering having a spinal cord stimulator trial or a pain pump trial with your team. I've been in PT for my back for almost a year @ [facility] and my pain is worsening.     I had a SCS trial @ [facility] around 10 years ago. My experience was that the technology at the time seemed to focus on addressing leg pain, and low back relief was difficult to obtain without complete leg parasthesia.     Has SCS technology evolved to a point where one can achieve low back relief without leg parasthesia? Is a pain pump a better option?     From your experience, does Medicare have a preferred approach?     Thank you very much for your help,  Tito  [phone]

## 2021-01-06 NOTE — GROUP NOTE
"Process Group Note    PATIENT'S NAME: Joel Pineda  MRN:   [id number]  :   1975  ACCT. NUMBER: [id number]  DATE OF SERVICE: 21  START TIME: 10:00 AM  END TIME: 10:50 AM  FACILITATOR: Lenore French [facility]  TOPIC:  Process Group    Diagnoses:  296.33 (F33.2) Major Depressive Disorder, Recurrent Episode, Severe _.  4. Other Diagnoses that is relevant to services:   300.00 (F41.9) Unspecified Anxiety Disorder  301.83 (F60.3) Borderline Personality Disorder.      [facility] Mental Health Day Treatment  TRACK: 6A    NUMBER OF PARTICIPANTS: 3      Telemedicine Visit: The patient's condition can be safely assessed and treated via synchronous audio and visual telemedicine encounter.      Reason for Telemedicine Visit: Services only offered telehealth    Originating Site (Patient Location): Patient's home    Distant Site (Provider Location): Provider Remote Setting    Consent:  The patient/guardian has verbally consented to: the potential risks and benefits of telemedicine (video visit) versus in person care; bill my insurance or make self-payment for services provided; and responsibility for payment of non-covered services.     Mode of Communication:  Video Conference via [employer]    As the provider I attest to compliance with applicable laws and regulations related to telemedicine.            Data:    Session content: At the start of this group, patients were invited to check in by identifying themselves, describing their current emotional status, and identifying issues to address in this group.   Area(s) of treatment focus addressed in this session included Symptom Management and Personal Safety.    Tito reported feeling \"conflicted\" today regarding his pain management because nothing is really working.  His goal for the day is to drive downtown today to see the dentist.  He took some time to share an update about his step-mom and his step-brother.  He reported that his step-brother has " been drinking a lot and he is frustarted about his behavior and that he can't do anything about it.     Therapeutic Interventions/Treatment Strategies:  Psychotherapist offered support, feedback and validation and reinforced use of skills. Treatment modalities used include Motivational Interviewing, Cognitive Behavioral Therapy and Dialectical Behavioral Therapy. Interventions include Relationship Skills: Discussed strategies to promote healthier understanding of interpersonal relationships.    Assessment:    Patient response:   Patient responded to session by accepting feedback, giving feedback and listening    Possible barriers to participation / learning include: and no barriers identified    Health Issues:   Yes: Pain, Associated Psychological Distress       Substance Use Review:   Substance Use: No active concerns identified.    Mental Status/Behavioral Observations  Appearance:   Appropriate   Eye Contact:   Good   Psychomotor Behavior: Normal   Attitude:   Cooperative   Orientation:   All  Speech   Rate / Production: Normal    Volume:  Normal   Mood:    Depressed   Affect:    Appropriate   Thought Content:   Clear  Thought Form:  Coherent  Logical     Insight:    Good     Plan:     Safety Plan: No current safety concerns identified.  Recommended that patient call 911 or go to the local ED should there be a change in any of these risk factors.     Barriers to treatment: None identified    Patient Contracts (see media tab):  None    Substance Use: Not addressed in session     Continue or Discharge: Patient will continue in Adult Day Treatment (ADT)  as planned. Patient is likely to benefit from learning and using skills as they work toward the goals identified in their treatment plan.      Lenore French, [facility]  January 6, 2021

## 2021-01-06 NOTE — GROUP NOTE
Psychotherapy Group Note    PATIENT'S NAME: Joel Pineda  MRN:   [id number]  :   1975  ACCT. NUMBER: [id number]  DATE OF SERVICE: 21  START TIME:  9:00 AM  END TIME:  9:50 AM  FACILITATOR: Morenita Lau LPCC  TOPIC: MH EBP Group: Cognitive Restructuring  [facility] Adult Mental Health Day Treatment  TRACK: 6A    NUMBER OF PARTICIPANTS: 4    Summary of Group / Topics Discussed:  Cognitive Restructuring: Distortions: Patients received an overview of how to identify common cognitive distortions. Patients will explore alternatives to cognitive distortions and practice challenging their negative thought patterns. The goal is to help patients target modify ineffective thought patterns.     Patient Session Goals / Objectives:    Familiarized self with ineffective / unhealthy thoughts and how they develop.      Explored impact of ineffective thoughts / distortions on mood and activity    Formulated new neutral/positive alternatives to challenge less helpful / ineffective thoughts.    Practiced and plan to apply in daily life    Telemedicine Visit: The patient's condition can be safely assessed and treated via synchronous audio and visual telemedicine encounter.      Reason for Telemedicine Visit: Services only offered telehealth and due to COVID-19    Originating Site (Patient Location): Patient's home    Distant Site (Provider Location): Provider Remote Setting    Consent:  The patient/guardian has verbally consented to: the potential risks and benefits of telemedicine (video visit) versus in person care; bill my insurance or make self-payment for services provided; and responsibility for payment of non-covered services.     Mode of Communication:  Video Conference via [employer]    As the provider I attest to compliance with applicable laws and regulations related to telemedicine.               Patient Participation / Response:  Fully participated with the group by sharing personal reflections /  insights and openly received / provided feedback with other participants.    Demonstrated understanding of topics discussed through group discussion and participation, Expressed understanding of the relationship between behaviors, thoughts, and feelings and Demonstrated knowledge of personal thought patterns and how they impact their mood and behavior.    Treatment Plan:  Patient has a current master individualized treatment plan.  See Epic treatment plan for more information.    Morenita Lau, [facility]

## 2021-01-07 ENCOUNTER — THERAPY VISIT (OUTPATIENT)
Dept: PHYSICAL THERAPY | Facility: CLINIC | Age: 46
End: 2021-01-07
Payer: MEDICARE

## 2021-01-07 ENCOUNTER — MYC MEDICAL ADVICE (OUTPATIENT)
Dept: PALLIATIVE MEDICINE | Facility: CLINIC | Age: 46
End: 2021-01-07

## 2021-01-07 DIAGNOSIS — M79.671 RIGHT FOOT PAIN: ICD-10-CM

## 2021-01-07 PROCEDURE — 97140 MANUAL THERAPY 1/> REGIONS: CPT | Mod: GP | Performed by: PHYSICAL THERAPIST

## 2021-01-07 PROCEDURE — 97035 APP MDLTY 1+ULTRASOUND EA 15: CPT | Mod: GP | Performed by: PHYSICAL THERAPIST

## 2021-01-07 NOTE — TELEPHONE ENCOUNTER
Tito,  Spinal Cord Stimulation has improved to better cover the low back.  Whether a pump or an SCS is appopropriate for you would be best answered by someone who manages both of those things- which would be 2 providers at the [facility].     If that is what you want to pursue, I think talking to Stacia Jack NP is the next step.  She can refer if appropriate.     Ericka Diaz MD  [facility] Management

## 2021-01-07 NOTE — TELEPHONE ENCOUNTER
Pt scheduled 1/14 for a 60 minute video visit.    Farida STONE    [facility] Pain Management

## 2021-01-07 NOTE — TELEPHONE ENCOUNTER
If Mr Pineda wishes to schedule with me, please schedule a 60 min appt.     JOCELYN Padgett, NP-C  [facility] [place]

## 2021-01-08 ENCOUNTER — HOSPITAL ENCOUNTER (OUTPATIENT)
Dept: BEHAVIORAL HEALTH | Facility: CLINIC | Age: 46
End: 2021-01-08
Attending: PSYCHIATRY & NEUROLOGY
Payer: MEDICARE

## 2021-01-08 PROCEDURE — 90853 GROUP PSYCHOTHERAPY: CPT | Mod: PO | Performed by: COUNSELOR

## 2021-01-08 PROCEDURE — 90853 GROUP PSYCHOTHERAPY: CPT | Mod: GT | Performed by: COUNSELOR

## 2021-01-08 NOTE — TELEPHONE ENCOUNTER
Patient is scheduled for an evaluation January 14.    JOCELYN Padgett, NP-C  [facility] [place]

## 2021-01-08 NOTE — GROUP NOTE
Psychotherapy Group Note    PATIENT'S NAME: Joel Pineda  MRN:   [id number]  :   1975  ACCT. NUMBER: [id number]  DATE OF SERVICE: 21  START TIME: 10:00 AM  END TIME: 10:50 AM  FACILITATOR: Morenita Lau LPCC  TOPIC: MH EBP Group: Cognitive Restructuring  [facility] Adult Mental Health Day Treatment  TRACK: 6A    NUMBER OF PARTICIPANTS: 5    Summary of Group / Topics Discussed:  Cognitive Restructuring: Perfectionism: Patients discussed and reflected on what perfectionism is, how it develops, and how it impacts functioning. Ways to challenge perfectionism were explored and discussed by the group, with the goal of challenging perfectionistic thinking and improving functioning.    Patient Session Goals / Objectives:    Understand the concept of perfectionistic thoughts and how they develop.     Increase recognition of the connection between perfectionistic thinking and symptoms.    Explore and practice new ways to challenge the perfectionistic stance and replace with reasonable expectations of self and others.    Begin to formulate realistic personal expectations and goals.    Telemedicine Visit: The patient's condition can be safely assessed and treated via synchronous audio and visual telemedicine encounter.      Reason for Telemedicine Visit: Services only offered telehealth and due to COVID-19    Originating Site (Patient Location): Patient's home    Distant Site (Provider Location): Provider Remote Setting    Consent:  The patient/guardian has verbally consented to: the potential risks and benefits of telemedicine (video visit) versus in person care; bill my insurance or make self-payment for services provided; and responsibility for payment of non-covered services.     Mode of Communication:  Video Conference via [employer]    As the provider I attest to compliance with applicable laws and regulations related to telemedicine.                 Patient Participation / Response:  Fully  participated with the group by sharing personal reflections / insights and openly received / provided feedback with other participants.    Demonstrated understanding of topics discussed through group discussion and participation, Expressed understanding of the relationship between behaviors, thoughts, and feelings and Demonstrated knowledge of personal thought patterns and how they impact their mood and behavior.    Treatment Plan:  Patient has a current master individualized treatment plan.  See Epic treatment plan for more information.    Morenita Lau, [facility]

## 2021-01-08 NOTE — GROUP NOTE
"Process Group Note    PATIENT'S NAME: Joel Pineda  MRN:   [id number]  :   1975  ACCT. NUMBER: [id number]  DATE OF SERVICE: 21  START TIME:  9:00 AM  END TIME:  9:50 AM  FACILITATOR: Lenore French [facility]  TOPIC:  Process Group    Diagnoses:  296.33 (F33.2) Major Depressive Disorder, Recurrent Episode, Severe _.  4. Other Diagnoses that is relevant to services:   300.00 (F41.9) Unspecified Anxiety Disorder  301.83 (F60.3) Borderline Personality Disorder.      [facility] Mental Health Day Treatment  TRACK: 6A    NUMBER OF PARTICIPANTS: 5    Telemedicine Visit: The patient's condition can be safely assessed and treated via synchronous audio and visual telemedicine encounter.      Reason for Telemedicine Visit: Services only offered telehealth    Originating Site (Patient Location): Patient's home    Distant Site (Provider Location): Provider Remote Setting    Consent:  The patient/guardian has verbally consented to: the potential risks and benefits of telemedicine (video visit) versus in person care; bill my insurance or make self-payment for services provided; and responsibility for payment of non-covered services.     Mode of Communication:  Video Conference via [employer]    As the provider I attest to compliance with applicable laws and regulations related to telemedicine.            Data:    Session content: At the start of this group, patients were invited to check in by identifying themselves, describing their current emotional status, and identifying issues to address in this group.   Area(s) of treatment focus addressed in this session included Symptom Management and Personal Safety.    Tito reported feeling \"stimulated\" today.  He reported he has continued to talk with his neurologist about options for pain management for his back.  His goals for the day are to watch \"a little bit\" of political news and just maintain his ADL.  Client declined additional process time but " contributed to group discussion and provided feedback and support to peers.      Therapeutic Interventions/Treatment Strategies:  Psychotherapist offered support, feedback and validation.    Assessment:    Patient response:   Patient responded to session by accepting feedback, giving feedback and listening    Possible barriers to participation / learning include: and no barriers identified    Health Issues:   Yes: Pain, Associated Psychological Distress       Substance Use Review:   Substance Use: Substance use has decreased    Mental Status/Behavioral Observations  Appearance:   Appropriate   Eye Contact:   Good   Psychomotor Behavior: Normal   Attitude:   Cooperative   Orientation:   All  Speech   Rate / Production: Normal    Volume:  Normal   Mood:    Depressed   Affect:    Appropriate   Thought Content:   Clear  Thought Form:  Coherent  Logical     Insight:    Good     Plan:     Safety Plan: No current safety concerns identified.  Recommended that patient call 911 or go to the local ED should there be a change in any of these risk factors.     Barriers to treatment: None identified    Patient Contracts (see media tab):  None    Substance Use: Not addressed in session     Continue or Discharge: Patient will continue in Adult Day Treatment (ADT)  as planned. Patient is likely to benefit from learning and using skills as they work toward the goals identified in their treatment plan.      Lenore French, [facility]  January 8, 2021

## 2021-01-08 NOTE — GROUP NOTE
Psychotherapy Group Note    PATIENT'S NAME: Joel Pineda  MRN:   [id number]  :   1975  ACCT. NUMBER: [id number]  DATE OF SERVICE: 21  START TIME: 11:00 AM  END TIME: 11:50 AM  FACILITATOR: Morenita Lau LPCC  TOPIC: MH EBP Group: Behavioral Activation  [facility] Adult Mental Health Day Treatment  TRACK: 6A    NUMBER OF PARTICIPANTS: 4    Summary of Group / Topics Discussed:  Behavioral Activation: Activity Scheduling:Patients explored how they currently spend their time, and how specific behaviors impact thoughts and feelings.  The group explored the effect of negative and positive activities on mood states and thought patterns.  Patients identified activities that help to improve mood and thinking patterns, and developed a plan to implement positive activities between sessions.      Patient Session Goals / Objectives:    Identify impact of current behaviors on thoughts and mood    Identify 2-3 behavioral changes that could have a positive impact on thoughts and mood    Prepare to make desired behavioral change: Create a change plan / activity schedule    Telemedicine Visit: The patient's condition can be safely assessed and treated via synchronous audio and visual telemedicine encounter.      Reason for Telemedicine Visit: Services only offered telehealth and due to COVID-19    Originating Site (Patient Location): Patient's home    Distant Site (Provider Location): Provider Remote Setting    Consent:  The patient/guardian has verbally consented to: the potential risks and benefits of telemedicine (video visit) versus in person care; bill my insurance or make self-payment for services provided; and responsibility for payment of non-covered services.     Mode of Communication:  Video Conference via [employer]    As the provider I attest to compliance with applicable laws and regulations related to telemedicine.        Patient Participation / Response:  Fully participated with the group by  sharing personal reflections / insights and openly received / provided feedback with other participants.    Demonstrated understanding of topics discussed through group discussion and participation, Expressed understanding of the relationship between behaviors, thoughts, and feelings and Shared experiences and challenges with making behavioral changes    Treatment Plan:  Patient has a current master individualized treatment plan.  See Epic treatment plan for more information.    Morenita Lau, LPCC

## 2021-01-12 ENCOUNTER — HOSPITAL ENCOUNTER (OUTPATIENT)
Dept: BEHAVIORAL HEALTH | Facility: CLINIC | Age: 46
End: 2021-01-12
Attending: PSYCHIATRY & NEUROLOGY
Payer: MEDICARE

## 2021-01-12 PROCEDURE — 90853 GROUP PSYCHOTHERAPY: CPT | Mod: GT | Performed by: COUNSELOR

## 2021-01-12 NOTE — GROUP NOTE
Psychotherapy Group Note    PATIENT'S NAME: Joel Pineda  MRN:   [id number]  :   1975  ACCT. NUMBER: [id number]  DATE OF SERVICE: 21  START TIME: 10:00 AM  END TIME: 10:50 AM  FACILITATOR: Lenore French LPCC  TOPIC:  EBP Group: [facility] Adult Mental Health Day Treatment  TRACK: 6A    NUMBER OF PARTICIPANTS: 7    Telemedicine Visit: The patient's condition can be safely assessed and treated via synchronous audio and visual telemedicine encounter.      Reason for Telemedicine Visit: Services only offered telehealth    Originating Site (Patient Location): Patient's home    Distant Site (Provider Location): Provider Remote Setting    Consent:  The patient/guardian has verbally consented to: the potential risks and benefits of telemedicine (video visit) versus in person care; bill my insurance or make self-payment for services provided; and responsibility for payment of non-covered services.     Mode of Communication:  Video Conference via [employer]    As the provider I attest to compliance with applicable laws and regulations related to telemedicine.      Summary of Group / Topics Discussed:  Mindfulness: What is Mindfulness: Patients received an overview on what mindfulness is and how mindfulness can benefit general health, mental health symptoms, and stressors. The history of mindfulness, its application to mental health therapies, and key concepts were also discussed. Patients discussed current awareness, knowledge, and practice of mindfulness skills. Patients also discussed barriers to mindfulness practice.     Patient Session Goals / Objectives:    Demonstrated understanding of key concepts and application to daily life    Identified when/how to use mindfulness     Resolved barriers to practice    Identified plan to use mindfulness in daily life      Patient Participation / Response:  Fully participated with the group by sharing personal reflections / insights and openly  received / provided feedback with other participants.    Demonstrated understanding of topics discussed through group discussion and participation, Demonstrated understanding of mindfulness skills and benefits of practice and Identified / Expressed personal readiness to practice mindfulness skills    Treatment Plan:  Patient has a current master individualized treatment plan.  See Epic treatment plan for more information.    Lenore French, [facility]

## 2021-01-12 NOTE — GROUP NOTE
Psychotherapy Group Note    PATIENT'S NAME: Joel Pineda  MRN:   [id number]  :   1975  ACCT. NUMBER: [id number]  DATE OF SERVICE: 21  START TIME: 11:00 AM  END TIME: 11:50 AM  FACILITATOR: Morenita Lau LPCC  TOPIC: MH EBP Group: Coping Skills  [facility] Adult Mental Health Day Treatment  TRACK: 6A    NUMBER OF PARTICIPANTS: 7    Summary of Group / Topics Discussed:  Coping Skills: Meditation: Patients learned about meditation and explored how and when to utilize it to increase focus, reduce mental health symptoms, decrease physical tension, and improve mental well-being.  Approaches to meditation were presented as a means of increasing self-awareness. The benefits of various meditation practices were discussed, as well as barriers to utilization of this coping strategy.     Patient Session Goals / Objectives:    Understand the purpose and efficacy of using meditation modalities to reduce stress / symptoms.    Review / discuss situations in daily life that cause distress, where establishing a meditation routine or meditating as needed may improve functioning.      Verbalize understanding of how and when to apply grounding strategies to reduce distress and increase presence in the moment.    Practice meditation and address barriers to use in daily life.    Telemedicine Visit: The patient's condition can be safely assessed and treated via synchronous audio and visual telemedicine encounter.      Reason for Telemedicine Visit: Services only offered telehealth and due to COVID-19    Originating Site (Patient Location): Patient's home    Distant Site (Provider Location): Provider Remote Setting    Consent:  The patient/guardian has verbally consented to: the potential risks and benefits of telemedicine (video visit) versus in person care; bill my insurance or make self-payment for services provided; and responsibility for payment of non-covered services.     Mode of Communication:  Video  Conference via [employer]    As the provider I attest to compliance with applicable laws and regulations related to telemedicine.          Patient Participation / Response:  Fully participated with the group by sharing personal reflections / insights and openly received / provided feedback with other participants.    Demonstrated understanding of topics discussed through group discussion and participation, Expressed understanding of the relevance / importance of coping skills at distressing times in life and Demonstrated knowledge of when to consider using a variety of coping skills in daily life    Treatment Plan:  Patient has a current master individualized treatment plan.  See Epic treatment plan for more information.    Morenita Lau, [facility]

## 2021-01-12 NOTE — GROUP NOTE
"Process Group Note    PATIENT'S NAME: Joel Pineda  MRN:   [id number]  :   1975  ACCT. NUMBER: [id number]  DATE OF SERVICE: 21  START TIME:  9:00 AM  END TIME:  9:50 AM  FACILITATOR: Lenore French [facility]  TOPIC:  Process Group    Diagnoses:  296.33 (F33.2) Major Depressive Disorder, Recurrent Episode, Severe _.  4. Other Diagnoses that is relevant to services:   300.00 (F41.9) Unspecified Anxiety Disorder  301.83 (F60.3) Borderline Personality Disorder.      [facility] Mental Health Day Treatment  TRACK: 6A    NUMBER OF PARTICIPANTS: 5    Telemedicine Visit: The patient's condition can be safely assessed and treated via synchronous audio and visual telemedicine encounter.      Reason for Telemedicine Visit: Services only offered telehealth    Originating Site (Patient Location): Patient's home    Distant Site (Provider Location): Provider Remote Setting    Consent:  The patient/guardian has verbally consented to: the potential risks and benefits of telemedicine (video visit) versus in person care; bill my insurance or make self-payment for services provided; and responsibility for payment of non-covered services.     Mode of Communication:  Video Conference via [employer]    As the provider I attest to compliance with applicable laws and regulations related to telemedicine.            Data:    Session content: At the start of this group, patients were invited to check in by identifying themselves, describing their current emotional status, and identifying issues to address in this group.   Area(s) of treatment focus addressed in this session included Symptom Management and Personal Safety.    Tito reported feeling \"better than usual today\" and was proud of himself for completing his social security paperwork.  His goal for the day is to go to the post office and drop off his paperwork.  Client declined additional process time but contributed to group discussion and provided feedback " and support to peers.      Therapeutic Interventions/Treatment Strategies:  Psychotherapist offered support, feedback and validation.    Assessment:    Patient response:   Patient responded to session by accepting feedback, giving feedback and listening    Possible barriers to participation / learning include: and no barriers identified    Health Issues:   Yes: Pain, Associated Psychological Distress       Substance Use Review:   Substance Use: No active concerns identified.    Mental Status/Behavioral Observations  Appearance:   Appropriate   Eye Contact:   Good   Psychomotor Behavior: Normal   Attitude:   Cooperative   Orientation:   All  Speech   Rate / Production: Normal    Volume:  Normal   Mood:    Depressed   Affect:    Appropriate   Thought Content:   Clear  Thought Form:  Coherent  Logical     Insight:    Good     Plan:     Safety Plan: No current safety concerns identified.  Recommended that patient call 911 or go to the local ED should there be a change in any of these risk factors.     Barriers to treatment: None identified    Patient Contracts (see media tab):  None    Substance Use: Not addressed in session     Continue or Discharge: Patient will continue in Adult Day Treatment (ADT)  as planned. Patient is likely to benefit from learning and using skills as they work toward the goals identified in their treatment plan.      Lenore French, [facility]  January 12, 2021

## 2021-01-13 ENCOUNTER — TELEPHONE (OUTPATIENT)
Dept: BEHAVIORAL HEALTH | Facility: CLINIC | Age: 46
End: 2021-01-13

## 2021-01-13 ENCOUNTER — HOSPITAL ENCOUNTER (OUTPATIENT)
Dept: BEHAVIORAL HEALTH | Facility: CLINIC | Age: 46
End: 2021-01-13
Attending: PSYCHIATRY & NEUROLOGY
Payer: MEDICARE

## 2021-01-13 PROCEDURE — 90853 GROUP PSYCHOTHERAPY: CPT | Mod: GT | Performed by: COUNSELOR

## 2021-01-13 NOTE — TELEPHONE ENCOUNTER
"Writer contacted patient regarding his message about feeling upset yesterday following group; patient expressed feeling uncomfortable in public yesterday when he was wearing his mask and women \"seemed scared of him.\" Patient and writer processed his emotions and patient agreed to discuss this in group programming today.     Morenita Lau, [facility] on 1/13/2021 at 8:25 AM    "

## 2021-01-13 NOTE — GROUP NOTE
Psychotherapy Group Note    PATIENT'S NAME: Joel Pineda  MRN:   [id number]  :   1975  ACCT. NUMBER: [id number]  DATE OF SERVICE: 21  START TIME: 11:00 AM  END TIME: 11:50 AM  FACILITATOR: Morenita Lau LPCC  TOPIC: MH EBP Group: Symptom Awareness  [facility] Adult Mental Health Day Treatment  TRACK: 6A    NUMBER OF PARTICIPANTS: 8    Summary of Group / Topics Discussed:  Symptom Awareness: Mood Disorders: Patients received a general overview of mood disorders including depressive disorders, anxiety disorders, and bipolar disorders and how it relates to their current symptoms. The purpose is to promote understanding of their diagnoses and how it impacts their functioning. Patients reviewed their current awareness of symptoms and diagnoses. Patients received information regarding diagnoses, etiology, cultural, and environmental factors as well as impact on functioning.     Patient Session Goals / Objectives:    Discussed patient individual symptoms and experiences    Reviewed diagnostic criteria and etiology of diagnoses     Telemedicine Visit: The patient's condition can be safely assessed and treated via synchronous audio and visual telemedicine encounter.      Reason for Telemedicine Visit: Services only offered telehealth and due to COVID-19    Originating Site (Patient Location): Patient's home    Distant Site (Provider Location): Provider Remote Setting    Consent:  The patient/guardian has verbally consented to: the potential risks and benefits of telemedicine (video visit) versus in person care; bill my insurance or make self-payment for services provided; and responsibility for payment of non-covered services.     Mode of Communication:  Video Conference via [employer]    As the provider I attest to compliance with applicable laws and regulations related to telemedicine.        Patient Participation / Response:  Fully participated with the group by sharing personal reflections /  insights and openly received / provided feedback with other participants.    Demonstrated understanding of topics discussed through group discussion and participation, Demonstrated understanding of how information regarding symptoms can assist in management of symptoms and Identified / Expressed personal readiness to increase awareness of symptoms and apply skills as necessary    Treatment Plan:  Patient has a current master individualized treatment plan.  See Epic treatment plan for more information.    Morenita Lau, LPCC

## 2021-01-13 NOTE — GROUP NOTE
"Process Group Note    PATIENT'S NAME: Joel Pineda  MRN:   [id number]  :   1975  ACCT. NUMBER: [id number]  DATE OF SERVICE: 21  START TIME: 10:00 AM  END TIME: 10:50 AM  FACILITATOR: Lenore French [facility]  TOPIC:  Process Group    Diagnoses:  296.33 (F33.2) Major Depressive Disorder, Recurrent Episode, Severe _.  4. Other Diagnoses that is relevant to services:   300.00 (F41.9) Unspecified Anxiety Disorder  301.83 (F60.3) Borderline Personality Disorder.      [facility] Mental Health Day Treatment  TRACK: 6A    NUMBER OF PARTICIPANTS: 8    Telemedicine Visit: The patient's condition can be safely assessed and treated via synchronous audio and visual telemedicine encounter.      Reason for Telemedicine Visit: Services only offered telehealth    Originating Site (Patient Location): Patient's home    Distant Site (Provider Location): Provider Remote Setting    Consent:  The patient/guardian has verbally consented to: the potential risks and benefits of telemedicine (video visit) versus in person care; bill my insurance or make self-payment for services provided; and responsibility for payment of non-covered services.     Mode of Communication:  Video Conference via [employer]    As the provider I attest to compliance with applicable laws and regulations related to telemedicine.            Data:    Session content: At the start of this group, patients were invited to check in by identifying themselves, describing their current emotional status, and identifying issues to address in this group.   Area(s) of treatment focus addressed in this session included Symptom Management and Personal Safety.    Tito reported feeling \"frustrated\" because he's in more pain than he wishes he was.   His goal for the day is to achieve \"pain stabilization\" by using a combination of several different types of pain treatments.  He took some process time to share his frustration with feeling judged in public " "when wearing a mask because he's a big huey and looks intimidating.   Group members gave him some helpful feedback in terms of wearing silly masks or ones with clear mouth coverings.  Writer also encouraged him to try to avoid \"mind-reading\" and assuming he knows what people are thinking.     Therapeutic Interventions/Treatment Strategies:  Psychotherapist offered support, feedback and validation and reinforced use of skills. Treatment modalities used include Motivational Interviewing, Cognitive Behavioral Therapy and Dialectical Behavioral Therapy. Interventions include Cognitive Restructuring:  Assisted patient in formulating new neutral/positive alternatives to challenge less helpful / ineffective thoughts.    Assessment:    Patient response:   Patient responded to session by accepting feedback, giving feedback and listening    Possible barriers to participation / learning include: and no barriers identified    Health Issues:   Yes: Pain, Associated Psychological Distress       Substance Use Review:   Substance Use: No active concerns identified.    Mental Status/Behavioral Observations  Appearance:   Appropriate   Eye Contact:   Good   Psychomotor Behavior: Normal   Attitude:   Cooperative   Orientation:   All  Speech   Rate / Production: Normal    Volume:  Normal   Mood:    Depressed   Affect:    Appropriate   Thought Content:   Clear  Thought Form:  Coherent  Logical     Insight:    Good     Plan:     Safety Plan: No current safety concerns identified.  Recommended that patient call 911 or go to the local ED should there be a change in any of these risk factors.     Barriers to treatment: None identified    Patient Contracts (see media tab):  None    Substance Use: Not addressed in session     Continue or Discharge: Patient will continue in Adult Day Treatment (ADT)  as planned. Patient is likely to benefit from learning and using skills as they work toward the goals identified in their treatment " plan.      Lenore French, [facility]  January 13, 2021

## 2021-01-13 NOTE — GROUP NOTE
"Process Group Note    PATIENT'S NAME: Joel Pineda  MRN:   [id number]  :   1975  ACCT. NUMBER: [id number]  DATE OF SERVICE: 21  START TIME:  9:00 AM  END TIME:  9:50 AM  FACILITATOR: Morenita Lau LPCC  TOPIC: MH Process Group    Diagnoses:  ***    [facility] Mental Health Day Treatment  TRACK: 6A    NUMBER OF PARTICIPANTS: 8    Telemedicine Visit: The patient's condition can be safely assessed and treated via synchronous audio and visual telemedicine encounter.      Reason for Telemedicine Visit: Services only offered telehealth and due to COVID-19    Originating Site (Patient Location): Patient's home    Distant Site (Provider Location): Provider Remote Setting    Consent:  The patient/guardian has verbally consented to: the potential risks and benefits of telemedicine (video visit) versus in person care; bill my insurance or make self-payment for services provided; and responsibility for payment of non-covered services.     Mode of Communication:  Video Conference via [employer]    As the provider I attest to compliance with applicable laws and regulations related to telemedicine.                Data:    Session content: At the start of this group, patients were invited to check in by identifying themselves, describing their current emotional status, and identifying issues to address in this group.   Area(s) of treatment focus addressed in this session included {OP BEH ADULT  AREA OF TREATMENT FOCUS:966426}.  ***    Therapeutic Interventions/Treatment Strategies:  {OP  THERAPEUTIC INTERVENTIONS/TREATMENT:829914}    Assessment:    Patient response:   Patient responded to session by {PATIENT RESPONSE:933967}    Possible barriers to participation / learning include: {POSSIBLE BARRIERS:222201}    Health Issues:   {YES / NO:174507}       Substance Use Review:   Substance Use: {YES / NO:533932}    Mental Status/Behavioral Observations  Appearance:   {Appearance:818972::\"Appropriate " "\"}  Eye Contact:   {Eye Contact:603288::\"Good \"}  Psychomotor Behavior: {Psychomotor Behavior:376440::\"Normal \"}  Attitude:   {Attitude:214888::\"Cooperative \"}  Orientation:   {Orientation:292140::\"All\"}  Speech   Rate / Production: {Speech Rate/Production:910296::\"Normal \"}   Volume:  {Speech Volume:527595::\"Normal \"}  Mood:    {Mood:348001::\"Normal\"}  Affect:    {Affect:404803::\"Appropriate \"}  Thought Content:   {Thought Content with Safety:676330}  Thought Form:  {Thought Form:354028::\"Coherent \",\"Logical \"}    Insight:    {Insight:811260::\"Good \"}    Plan:     Safety Plan: {SAFETY PLAN:464150}     Barriers to treatment: {Barriers to Treatment:389803}    Patient Contracts (see media tab):  {Patient Contracts:837726}    Substance Use: {SUBSTANCE USE ASSESSMENT/INTERVENTION:954821}     Continue or Discharge: {Continue or Discharge:161638}      Morenita Lau, [facility]  January 13, 2021  "

## 2021-01-13 NOTE — GROUP NOTE
Psychotherapy Group Note    PATIENT'S NAME: Joel Pineda  MRN:   [id number]  :   1975  ACCT. NUMBER: [id number]  DATE OF SERVICE: 21  START TIME:  9:00 AM  END TIME:  9:50 AM  FACILITATOR: Morenita Lau LPCC  TOPIC: MH EBP Group: Cognitive Restructuring  [facility] Adult Mental Health Day Treatment  TRACK: 6A    NUMBER OF PARTICIPANTS: 8    Summary of Group / Topics Discussed:  Cognitive Restructuring: Introduction and Overview: Patients were introduced to Cognitive Behavioral Therapy (CBT) as a way to identify ineffective thought patterns, understand factors that contribute to ineffective thought patterns, gain awareness of the impact of those thoughts on emotions and behavior, and learn methods for modifying thinking to achieve better mood regulation. Patients received a general overview of how ones thoughts influence feelings and behaviors in the context of CBT. Patients explored the development of their own thought patterns and how they impact daily functioning.      Patient Session Goals / Objectives:    Familiarize self with the interrelationship of thoughts, feelings and behavior.    Identify ineffective / unhelpful thought patterns and their impact on mood.    Telemedicine Visit: The patient's condition can be safely assessed and treated via synchronous audio and visual telemedicine encounter.      Reason for Telemedicine Visit: Services only offered telehealth and due to COVID-19    Originating Site (Patient Location): Patient's home    Distant Site (Provider Location): Provider Remote Setting    Consent:  The patient/guardian has verbally consented to: the potential risks and benefits of telemedicine (video visit) versus in person care; bill my insurance or make self-payment for services provided; and responsibility for payment of non-covered services.     Mode of Communication:  Video Conference via [employer]    As the provider I attest to compliance with applicable laws and  regulations related to telemedicine.               Patient Participation / Response:  Fully participated with the group by sharing personal reflections / insights and openly received / provided feedback with other participants.    Demonstrated understanding of topics discussed through group discussion and participation, Expressed understanding of the relationship between behaviors, thoughts, and feelings and Demonstrated knowledge of personal thought patterns and how they impact their mood and behavior.    Treatment Plan:  Patient has a current master individualized treatment plan.  See Epic treatment plan for more information.    Morenita Lau, [facility]

## 2021-01-14 ENCOUNTER — THERAPY VISIT (OUTPATIENT)
Dept: PHYSICAL THERAPY | Facility: CLINIC | Age: 46
End: 2021-01-14
Payer: MEDICARE

## 2021-01-14 ENCOUNTER — VIRTUAL VISIT (OUTPATIENT)
Dept: PALLIATIVE MEDICINE | Facility: CLINIC | Age: 46
End: 2021-01-14
Payer: MEDICARE

## 2021-01-14 DIAGNOSIS — M48.061 SPINAL STENOSIS OF LUMBAR REGION WITHOUT NEUROGENIC CLAUDICATION: ICD-10-CM

## 2021-01-14 DIAGNOSIS — M51.369 DDD (DEGENERATIVE DISC DISEASE), LUMBAR: ICD-10-CM

## 2021-01-14 DIAGNOSIS — E08.42 DIABETIC POLYNEUROPATHY ASSOCIATED WITH DIABETES MELLITUS DUE TO UNDERLYING CONDITION (H): ICD-10-CM

## 2021-01-14 DIAGNOSIS — M45.8 ANKYLOSING SPONDYLITIS OF SACRAL REGION (H): Primary | ICD-10-CM

## 2021-01-14 DIAGNOSIS — M62.838 MUSCLE SPASM: ICD-10-CM

## 2021-01-14 DIAGNOSIS — M79.671 RIGHT FOOT PAIN: ICD-10-CM

## 2021-01-14 PROCEDURE — 97035 APP MDLTY 1+ULTRASOUND EA 15: CPT | Mod: GP | Performed by: PHYSICAL THERAPIST

## 2021-01-14 PROCEDURE — 99214 OFFICE O/P EST MOD 30 MIN: CPT | Mod: 95 | Performed by: NURSE PRACTITIONER

## 2021-01-14 PROCEDURE — 97140 MANUAL THERAPY 1/> REGIONS: CPT | Mod: GP | Performed by: PHYSICAL THERAPIST

## 2021-01-14 ASSESSMENT — PAIN SCALES - GENERAL: PAINLEVEL: SEVERE PAIN (7)

## 2021-01-14 NOTE — PROGRESS NOTES
"      [facility]      Joel Pineda is a 45 year old male who is being evaluated via a billable virtual visit.      VIDEO VISIT   The patient has been notified of following:   \"This video visit will be conducted via a call between you and your physician/provider. We have found that certain health care needs can be provided without the need for an in-person physical exam.  This service lets us provide the care you need with a video conversation.  If a prescription is necessary we can send it directly to your pharmacy.  If lab work is needed we can place an order for that and you can then stop by our lab to have the test done at a later time.  Video visits are billed at different rates depending on your insurance coverage.  Please reach out to your insurance provider with any questions.  If during the course of the call the physician/provider feels a video visit is not appropriate, you will not be charged for this service.\"    How would you like to obtain your AVS? MyChart  If the video visit is dropped, the invitation should be resent by: Text to cell phone: [phone]  Will anyone else be joining your video visit? No     Alexandre Curran MA    Video-Visit Details  Type of service:  Video Visit  Video Start Time: 3:32 PM  Video End Time: 3:59  Originating Location (pt. Location): Home  Distant Location (provider location):  [facility]   Platform used for Video Visit: Sophia        CHIEF COMPLAINT: Multiple chronic pain concerns     INTERVAL HISTORY:  Last seen on 8/10/20.        Recommendations/plan at the last visit included:  1. Schedule follow-up with JOCELYN Zavala, NP-C in 4-8 weeks or sooner as needed.  If you would like to participate in the multidisciplinary pain management program please schedule a 60-minute follow-up appointment.  2. Medical cannabis certification for topical cannabis.  Apply to healed lesion sites and feet for neuropathic pain.  You " must your primary care provider before starting medical cannabis.  3. Medication recommendations:  Continue current medications    Since last visit:   - Had SCS evaluation about 10 yrs ago. At that time, it was not effective enough. Wondering if he is a candidate to try SCS again. 2007 had discectomy, laminectomy, foraminotomy. LESI 6/6/19 did provide some relief.   - Completed PT at [facility] in November. Attended for over a year with a 3 mo break r/t to COVID. It was too painful so he stopped after awhile. He was finding that he was using more oxycodone.     Pain Information:   Pain quality: Burning and searing, overwhelming    Pain rating: Pain rating: intensity ranges from 2/10 to 6/10, and averages 4/10 on a 0-10 scale.    Annual Requirements last collected:  N/A    Current Pain Relevant Medications:    Oxycodone 5 mg PRN chronic pain, occasional for PHN  Lyrica 100 mg 3 times daily  Relafen 500 mg, 1 to 2 tablets daily  Lidocaine, Capsaicin topically 2-3 times daily  Xanax 0.5 mg in afternoon, 1 mg at HS     Previous Pain Relevant Medications: (H--helped; HI--Helped initially; [facility]--Somewhat helpful; NH--No help; W--worse; SE--side effects; ?--Unsure if helpful)   NOTE: This medication information taken from patient's intake form, not medical records.  This will be added at a later date if patient decides to enter comprehensive pain management program     Any illicit drug use: denies   EtOH use: none   Caffeine use: 6-8 per day   Nicotine use: None     THE 4 As OF OPIOID MAINTENANCE ANALGESIA    Analgesia: Is pain relief clinically significant? YES   Activity: Is patient functional and able to perform Activities of Daily Living? YES   Adverse effects: Is patient free from adverse side effects from opiates? YES   Adherence to Rx protocol: Is patient adhering to Controlled Substance Agreement and taking medications ONLY as ordered? YES     Is Narcan prescribed for opiate use >50 MME daily or concurrent use of  opiates and benzodiazepines?  Yes prescribed by this writer 8/10/2020     [place] Board of Pharmacy Data Base Reviewed:    YES; No concern for abuse or misuse of controlled medications based on this report.     Past Pain Treatments:               Pain Clinic:   Yes    [facility] pain management: For spinal injections with Dr Diaz, [facility]: injections, nerve ablation, [facility] for SCS treatment.  Did trial but did not find it beneficial.   [facility] chronic pain program.                   PT: Yes  For other reasons. Neck, back and feet              Psychologist: Yes ongoing with private therapist.at  [facility].               Chiropractor: Yes years ago              Acupuncture: Yes [facility]              Pharmacotherapy:                           Opioids: Yes                            Non-opioids:    Yes               TENs Unit:Yes H for back               Injections: Yes Many for neck and back               Surgeries related to pain: Yes               October 2007 L5-S1 laminectomy, micro discectomy    _______________________________________________      Physical Exam unable to be completed due to virtual visit. There were no vitals taken for this visit or reported by patient.     Review of Systems: A 10-point review of systems was negative, with the exception of any concerns as noted and chronic pain issues.      General: Verbal, alert and no distress   Psychiatric:  affect and mood normal, mentation appears normal    DIRE Score for ongoing opioid management is calculated as follows:    Diagnosis = 3 pts (advanced condition; severe pain/objective findings)    Intractability = 3 pts (patient fully engaged but inadequate response to treatments)    Risk        Psych = 3 pts (no significant personality dysfunction/mental illness; good communication with clinic)         Chem Hlth = 3 pts (no history of chemical dependency; not drug-focused)       Reliability = 2 pts (occasional difficulties with compliance; generally  reliable)       Social = 2 pts (reduction in some relationships/life rolls)       (Psych + Chem hlth + Reliability + Social) = 16    Efficacy = 2 pts (moderate benefit/function; low med dose; too early/not tried meds)    DIRE Score = 18        7-13: likely NOT suitable candidate for long-term opioid analgesia       14-21: may be a suitable candidate for long-term opioid analgesia     Previous Diagnostic Tests:   Imaging Studies:   MR LUMBAR SPINE W/O CONTRAST 6/27/2019  Comparison: Lumbar spine MRI 12/7/2016  Findings: There are 5 lumbar-type vertebrae assumed for the purposes of this dictation.  The tip of the conus medullaris is at L1-L2.  Normal lumbar vertebral alignment.  There is multilevel disc height narrowing , most pronounced at L3-S1.  Normal marrow signal. There are degenerative changes the superior endplate of L5.  On a level by level basis:  T12-L1: No spinal canal or neuroforaminal stenosis.  L1-2: No spinal canal or neuroforaminal stenosis.  L2-3: No spinal canal or neuroforaminal stenosis.  L3-4: Mild broad-based disc bulge with a central annular fissure and superimposed central superiorly migrating disc extrusion. Mild bilateral facet arthropathy. Mild neural foraminal stenosis  bilaterally. Mild canal is patent.  L4-5: Broad-based disc bulge with superimposed central inferiorly migrating disc extrusion. Mild bilateral facet arthropathy. Mild bilateral neural foraminal stenosis. Spinal canal is patent.  L5-S1: Broad-based disc bulge with superimposed central extrusion inferiorly that abuts the traversing left S1 nerve root without evidence for impingement. Facet arthropathy bilaterally. Moderate to severe left and moderate right neural foraminal stenosis. Spinal canal is patent. Paraspinous tissues are within normal limits.  Impression:   1. Multilevel lumbar spondylosis, most pronounced at L5-S1 with  moderate to severe left and moderate right neural foraminal stenosis as well as narrowing of the  left lateral recess and abutment the traversing left S1 nerve root.   2. Additional multilevel degenerative changes of the lumbar spine as described above.    ASSESSMENT:   1.  Postherpetic neuralgia  2.  Ankylosing spondylosis, Lumbar DDD, with nerve involvement   3.  Chronic migraine headaches  4.  History: Complex medical issues, see history    Tito returns to discuss whether or not he is a candidate for spinal cord stimulator. His low back pain is primarily axial. He has diabetic neuropathy but he is interested in focusing on his low back as related to SCS. Despite nerve involvement, he denies much lumbar radicular pain .    Previous PT was too strenuous. We will start with Pain PT. May consider neurosurgery referral but I'd like to have a clinic visit for complete physical exam first.       Plan:    Diagnosis reviewed, treatment option addressed, and risk/benifits discussed.  Self-care instructions given.  I am recommending a multidisciplinary treatment plan to help this patient better manage pain.      1. Schedule physical therapy assessment with Gurwinder rodriguez [place]  2. Schedule VIDEO follow-up with JOCELYN Zavala NP-C in 4 weeks PRIOR to any refills that will be due at that time.   3. Medication recommendations:   1. Baclofen 10 mg 1/2-1 tab two times daily     I have reviewed the note as documented above.  This accurately captures the substance of my conversation with the patient.    BILLING TIME DOCUMENTATION:   The total TIME spent on this patient on the day of the appointment was 32 minutes.   TOTAL TIME includes:   Time spent preparing to see the patient 3 minutes (reviewing records and tests)  Time spend face to face with the patient: 27minutes  Time spent ordering tests, medications, procedures and referrals: 0 minutes  Time spent Referring and communicating with other healthcare professionals: 0 minutes  Documenting clinical information in Epic: 2 minutes    JOCELYN Padgett NP-C  [facility]  [facility]

## 2021-01-15 ENCOUNTER — HOSPITAL ENCOUNTER (OUTPATIENT)
Dept: BEHAVIORAL HEALTH | Facility: CLINIC | Age: 46
End: 2021-01-15
Attending: PSYCHIATRY & NEUROLOGY
Payer: MEDICARE

## 2021-01-15 PROCEDURE — 90853 GROUP PSYCHOTHERAPY: CPT | Mod: GT | Performed by: COUNSELOR

## 2021-01-15 PROCEDURE — 90853 GROUP PSYCHOTHERAPY: CPT | Mod: PO | Performed by: COUNSELOR

## 2021-01-15 NOTE — GROUP NOTE
Psychoeducation Group Note    PATIENT'S NAME: Joel Pineda  MRN:   [id number]  :   1975  ACCT. NUMBER: [id number]  DATE OF SERVICE: 1/15/21  START TIME: 11:00 AM  END TIME: 11:50 AM  FACILITATOR: Morenita Lau LPCC  TOPIC: MH RN Group: Health Maintenance  [facility] Adult Mental Health Day Treatment  TRACK: 6A    NUMBER OF PARTICIPANTS: 6    Summary of Group / Topics Discussed:  Health Maintenance: Weekend planning: Patients were given time to complete a weekend plan of what they will do to promote wellness and sobriety over the weekend when they do not have the structure of group. Patients were encouraged to review progress on their treatment goals and were challenged to identify ways to work toward meeting them. Patients identified and discussed foreseeable barriers to success over the weekend and then developed a plan to overcome them. Patients reviewed their distress coping skills and social support network and discussed this with the group.       Patient Session Goals / Objectives:    ?    Identified activities to engage in that promote balance in wellness  ?    Distinguished possible barriers to success over the weekend and created a plan to overcome them  ?    Listed distress coping skills and identified social support network to utilize if in crisis during the weekend    Telemedicine Visit: The patient's condition can be safely assessed and treated via synchronous audio and visual telemedicine encounter.      Reason for Telemedicine Visit: Services only offered telehealth and due to COVID-19    Originating Site (Patient Location): Patient's home    Distant Site (Provider Location): Provider Remote Setting    Consent:  The patient/guardian has verbally consented to: the potential risks and benefits of telemedicine (video visit) versus in person care; bill my insurance or make self-payment for services provided; and responsibility for payment of non-covered services.     Mode of  Communication:  Video Conference via [employer]    As the provider I attest to compliance with applicable laws and regulations related to telemedicine.            Patient Participation / Response:  Fully participated with the group by sharing personal reflections / insights and openly received / provided feedback with other participants.    Demonstrated understanding of topics discussed through group discussion and participation, Identified / Expressed personal readiness to practice skills and Verbalized understanding of health maintenance topic    Treatment Plan:  Patient has a current master individualized treatment plan.  See Epic treatment plan for more information.    Morenita Lau, [facility]

## 2021-01-15 NOTE — GROUP NOTE
Psychotherapy Group Note    PATIENT'S NAME: Joel Pineda  MRN:   [id number]  :   1975  ACCT. NUMBER: [id number]  DATE OF SERVICE: 1/15/21  START TIME: 10:00 AM  END TIME: 10:50 AM  FACILITATOR: Morenita Lau LPCC  TOPIC: MH EBP Group: Specialty Awareness  [facility] Adult Mental Health Day Treatment  TRACK: 6A    NUMBER OF PARTICIPANTS: 6    Summary of Group / Topics Discussed:  Specialty Topics: Hope: The topic of hope was presented in order to help patients better understand the symptoms of hopelessness and how to become more hopeful. Patients discussed their current awareness of the topic and relevance to their functioning. Individual experiences with symptoms and treatment options were also discussed. Patients explored options for ongoing/future treatment and symptom management.      Patient Session Goals / Objectives:    Discussed definition of hopelessness    Discussed how hopelessness impacts functioning    Set a plan to utilize skills to reduce hopelessness    Telemedicine Visit: The patient's condition can be safely assessed and treated via synchronous audio and visual telemedicine encounter.      Reason for Telemedicine Visit: Services only offered telehealth and due to COVID-19    Originating Site (Patient Location): Patient's home    Distant Site (Provider Location): Provider Remote Setting    Consent:  The patient/guardian has verbally consented to: the potential risks and benefits of telemedicine (video visit) versus in person care; bill my insurance or make self-payment for services provided; and responsibility for payment of non-covered services.     Mode of Communication:  Video Conference via [employer]    As the provider I attest to compliance with applicable laws and regulations related to telemedicine.        Patient Participation / Response:  Fully participated with the group by sharing personal reflections / insights and openly received / provided feedback with other  participants.    Demonstrated understanding of topics discussed through group discussion and participation, Identified / Expressed readiness to act on skill suggestions discussed in topic and Verbalized understanding of ways to proactively manage illness    Treatment Plan:  Patient has a current master individualized treatment plan.  See Epic treatment plan for more information.    Morenita Lau, [facility]

## 2021-01-15 NOTE — GROUP NOTE
"Process Group Note    PATIENT'S NAME: Joel Pineda  MRN:   [id number]  :   1975  ACCT. NUMBER: [id number]  DATE OF SERVICE: 1/15/21  START TIME:  9:00 AM  END TIME:  9:50 AM  FACILITATOR: Lenore French [facility]  TOPIC:  Process Group    Diagnoses:  296.33 (F33.2) Major Depressive Disorder, Recurrent Episode, Severe _.  4. Other Diagnoses that is relevant to services:   300.00 (F41.9) Unspecified Anxiety Disorder  301.83 (F60.3) Borderline Personality Disorder.      [facility] Mental Health Day Treatment  TRACK: 6A    NUMBER OF PARTICIPANTS: 6    Telemedicine Visit: The patient's condition can be safely assessed and treated via synchronous audio and visual telemedicine encounter.      Reason for Telemedicine Visit: Services only offered telehealth    Originating Site (Patient Location): Patient's home    Distant Site (Provider Location): Provider Remote Setting    Consent:  The patient/guardian has verbally consented to: the potential risks and benefits of telemedicine (video visit) versus in person care; bill my insurance or make self-payment for services provided; and responsibility for payment of non-covered services.     Mode of Communication:  Video Conference via [employer]    As the provider I attest to compliance with applicable laws and regulations related to telemedicine.            Data:    Session content: At the start of this group, patients were invited to check in by identifying themselves, describing their current emotional status, and identifying issues to address in this group.   Area(s) of treatment focus addressed in this session included Symptom Management and Personal Safety.    Tito reported feeling \"relieved\" and \"optimistic\" today because he's going to start \"pain physical therapy,\" which he didn't know existed.  His goal for the day is to call and see if he can return his TENS until.  Client declined additional process time but contributed to group discussion and " provided feedback and support to peers.      Therapeutic Interventions/Treatment Strategies:  Psychotherapist offered support, feedback and validation.    Assessment:    Patient response:   Patient responded to session by accepting feedback, giving feedback and listening    Possible barriers to participation / learning include: and no barriers identified    Health Issues:   None reported       Substance Use Review:   Substance Use: No active concerns identified.    Mental Status/Behavioral Observations  Appearance:   Appropriate   Eye Contact:   Good   Psychomotor Behavior: Normal   Attitude:   Cooperative   Orientation:   All  Speech   Rate / Production: Normal    Volume:  Normal   Mood:    Depressed   Affect:    Appropriate   Thought Content:   Clear  Thought Form:  Coherent  Logical     Insight:    Good     Plan:     Safety Plan: No current safety concerns identified.  Recommended that patient call 911 or go to the local ED should there be a change in any of these risk factors.     Barriers to treatment: None identified    Patient Contracts (see media tab):  None    Substance Use: Not addressed in session     Continue or Discharge: Patient will continue in Adult Day Treatment (ADT)  as planned. Patient is likely to benefit from learning and using skills as they work toward the goals identified in their treatment plan.      Lenore French, [facility]  January 15, 2021

## 2021-01-17 RX ORDER — BACLOFEN 10 MG/1
5-10 TABLET ORAL 2 TIMES DAILY
Qty: 60 TABLET | Refills: 1 | Status: SHIPPED | OUTPATIENT
Start: 2021-01-17 | End: 2021-02-15

## 2021-01-18 ENCOUNTER — MYC REFILL (OUTPATIENT)
Dept: FAMILY MEDICINE | Facility: CLINIC | Age: 46
End: 2021-01-18

## 2021-01-18 DIAGNOSIS — M45.8 ANKYLOSING SPONDYLITIS OF SACRAL REGION (H): ICD-10-CM

## 2021-01-18 DIAGNOSIS — M51.369 DDD (DEGENERATIVE DISC DISEASE), LUMBAR: ICD-10-CM

## 2021-01-18 NOTE — PATIENT INSTRUCTIONS
After Visit Instructions:     Thank you for coming to [facility] for your care. It is my goal to partner with you to help you reach your optimal state of health.     Continue daily self-care, identifying contributing factors, and monitoring variations in pain level. Continue to integrate self-care into your life.      1. Schedule physical therapy assessment with Gurwinder rodriguez [place]  2. Schedule VIDEO follow-up with JOCELYN Zavala NP-C in 4 weeks PRIOR to any refills that will be due at that time.   3. Medication recommendations:   1. Baclofen 10 mg 1/2-1 tab two times daily       JOCELYN Padgett NP-C  [facility]  [facility]    Clinic Number:  [phone]     Call with any questions about your care and for scheduling assistance.     Calls are returned Monday through Friday between 8 AM and 4:30 PM. We usually get back to you within 2 business days depending on the issue/request.    If we are prescribing your medications:    For opioid medication refills, call the clinic or send a B[employer]ze[employer] message 7 days in advance.  Please include:    Name of requested medication    Name of the pharmacy.    For non-opioid medications, call your pharmacy directly to request a refill. Please allow 3-4 days to be processed.     Per [place] State Law:    All controlled substance prescriptions must be filled within 30 days of being written.      For those controlled substances allowing refills, pickup must occur within 30 days of last fill.      We believe regular attendance is key to your success in our program!      Any time you are unable to keep your appointment we ask that you call us at least 24 hours in advance to cancel.This will allow us to offer the appointment time to another patient.   Multiple missed appointments may lead to dismissal from the clinic.

## 2021-01-19 ENCOUNTER — HOSPITAL ENCOUNTER (OUTPATIENT)
Dept: BEHAVIORAL HEALTH | Facility: CLINIC | Age: 46
End: 2021-01-19
Attending: PSYCHIATRY & NEUROLOGY
Payer: MEDICARE

## 2021-01-19 PROCEDURE — 90853 GROUP PSYCHOTHERAPY: CPT | Mod: PO | Performed by: COUNSELOR

## 2021-01-19 PROCEDURE — 90853 GROUP PSYCHOTHERAPY: CPT | Mod: GT | Performed by: COUNSELOR

## 2021-01-19 RX ORDER — OXYCODONE HYDROCHLORIDE 5 MG/1
5-10 TABLET ORAL EVERY 6 HOURS PRN
Qty: 35 TABLET | Refills: 0 | Status: SHIPPED | OUTPATIENT
Start: 2021-01-19 | End: 2021-02-09

## 2021-01-19 NOTE — TELEPHONE ENCOUNTER
Routing refill request to provider for review/approval because:  Drug not on the [facility] refill protocol     Gayle ALEJANDRON, RN

## 2021-01-19 NOTE — GROUP NOTE
"Process Group Note    PATIENT'S NAME: Joel Pineda  MRN:   [id number]  :   1975  ACCT. NUMBER: [id number]  DATE OF SERVICE: 21  START TIME:  9:00 AM  END TIME:  9:50 AM  FACILITATOR: Lenore French [facility]  TOPIC:  Process Group    Diagnoses:  296.33 (F33.2) Major Depressive Disorder, Recurrent Episode, Severe _.  4. Other Diagnoses that is relevant to services:   300.00 (F41.9) Unspecified Anxiety Disorder  301.83 (F60.3) Borderline Personality Disorder.      [facility] Mental Health Day Treatment  TRACK: 6A    NUMBER OF PARTICIPANTS: 6    Telemedicine Visit: The patient's condition can be safely assessed and treated via synchronous audio and visual telemedicine encounter.      Reason for Telemedicine Visit: Services only offered telehealth    Originating Site (Patient Location): Patient's home    Distant Site (Provider Location): Provider Remote Setting    Consent:  The patient/guardian has verbally consented to: the potential risks and benefits of telemedicine (video visit) versus in person care; bill my insurance or make self-payment for services provided; and responsibility for payment of non-covered services.     Mode of Communication:  Video Conference via [employer]    As the provider I attest to compliance with applicable laws and regulations related to telemedicine.            Data:    Session content: At the start of this group, patients were invited to check in by identifying themselves, describing their current emotional status, and identifying issues to address in this group.   Area(s) of treatment focus addressed in this session included Symptom Management and Personal Safety.    Tito reported feeling \"frustrated\" today due to an appointment he had yesterday.  He shared that he learned from his therapist yesterday that she thinks he needs to work on his trauma work, which he is not sure about. His goal today is to order a trauma workbook that his therapist recommended. "     Therapeutic Interventions/Treatment Strategies:  Psychotherapist offered support, feedback and validation and reinforced use of skills. Treatment modalities used include Motivational Interviewing, Cognitive Behavioral Therapy and Dialectical Behavioral Therapy. Interventions include Other: Assisted patient  in finding ways to adapt functioning in light of past traumatic experiences.    Assessment:    Patient response:   Patient responded to session by accepting feedback, giving feedback and listening    Possible barriers to participation / learning include: and no barriers identified    Health Issues:   Yes: Pain, Associated Psychological Distress       Substance Use Review:   Substance Use: No active concerns identified.    Mental Status/Behavioral Observations  Appearance:   Appropriate   Eye Contact:   Good   Psychomotor Behavior: Normal   Attitude:   Cooperative   Orientation:   All  Speech   Rate / Production: Normal    Volume:  Normal   Mood:    Depressed   Affect:    Appropriate   Thought Content:   Clear  Thought Form:  Coherent  Logical     Insight:    Good     Plan:     Safety Plan: No current safety concerns identified.  Recommended that patient call 911 or go to the local ED should there be a change in any of these risk factors.     Barriers to treatment: None identified    Patient Contracts (see media tab):  None    Substance Use: Not addressed in session     Continue or Discharge: Patient will continue in Adult Day Treatment (ADT)  as planned. Patient is likely to benefit from learning and using skills as they work toward the goals identified in their treatment plan.      Lenore French, [facility]  January 19, 2021

## 2021-01-19 NOTE — PROGRESS NOTES
Patient Active Problem List   Diagnosis     Major depressive disorder, recurrent episode (H)     Intermittent asthma     Hyperlipidemia LDL goal <100     Chronic nonallergic rhinitis     Diverticulosis     GERD (gastroesophageal reflux disease)     Anxiety     LLOYD (obstructive sleep apnea)- mild (AHI 11)     Intracranial arachnoid cyst     Facet arthritis of cervical region     Acquired hypothyroidism     Bipolar 2 disorder (H)     Chronic midline low back pain without sciatica     Irritable bowel syndrome with diarrhea     B12 deficiency     Essential hypertension with goal blood pressure less than 140/90     Chronic pain syndrome     Ankylosing spondylitis of sacral region (H)     Morbid obesity due to hypertriglyceridemia (H)     Fatty infiltration of liver     DDD (degenerative disc disease), lumbar     Type 2 diabetes mellitus with complication, without long-term current use of insulin (H)     Peripheral polyneuropathy     History of pulmonary embolism     Ingrown toenail     Hypertriglyceridemia     Gastroparesis     Orthostatic dizziness     POTS (postural orthostatic tachycardia syndrome)     PHN (postherpetic neuralgia)     Dysautonomia (H)     Major depressive disorder, recurrent episode, severe (H)     Right foot pain       Current Outpatient Medications:      acetaminophen 500 MG CAPS, Take 500 mg by mouth every 4 hours as needed, Disp: 60 capsule, Rfl:      albuterol (PROAIR HFA/PROVENTIL HFA/VENTOLIN HFA) 108 (90 Base) MCG/ACT inhaler, Inhale 2 puffs into the lungs every 4 hours as needed for shortness of breath / dyspnea or wheezing, Disp: 8.5 g, Rfl: 11     albuterol (PROVENTIL) (2.5 MG/3ML) 0.083% neb solution, Take 1 vial (2.5 mg) by nebulization every 6 hours as needed for shortness of breath / dyspnea or wheezing, Disp: 200 mL, Rfl: 3     aspirin (ASA) 81 MG tablet, Take 81 mg by mouth daily , Disp: , Rfl:      baclofen (LIORESAL) 10 MG tablet, Take 0.5-1 tablets (5-10 mg) by mouth 2 times daily,  Disp: 60 tablet, Rfl: 1     cetirizine (ZYRTEC) 10 MG tablet, Take 1 tablet (10 mg) by mouth At Bedtime, Disp: 30 tablet, Rfl: 11     cholecalciferol (VITAMIN D3) 21060 units (1250 mcg) capsule, Take one capsule every two weeks., Disp: 24 capsule, Rfl: 1     clonazePAM (KLONOPIN) 0.5 MG tablet, Take 2 tablets by mouth At Bedtime, Disp: , Rfl:      cyanocobalamin (CYANOCOBALAMIN) 1000 MCG/ML injection, Inject 1 mL (1,000 mcg) into the muscle every 30 days, Disp: 10 mL, Rfl: 0     DULoxetine (CYMBALTA) 60 MG capsule, Take 60 mg by mouth 2 times daily , Disp: , Rfl:      EPINEPHrine (EPIPEN/ADRENACLICK/OR ANY BX GENERIC EQUIV) 0.3 MG/0.3ML injection 2-pack, Inject 0.3 mLs (0.3 mg) into the muscle once as needed for anaphylaxis, Disp: 0.6 mL, Rfl: 3     etanercept (ENBREL SURECLICK) 50 MG/ML autoinjector, Inject 50 mg Subcutaneous once a week Hold for signs of infection, and seek medical attention., Disp: 4 mL, Rfl: 6     famotidine (PEPCID) 20 MG tablet, Prior to administration of Humira every 2 weeks (Patient taking differently: Take 20 mg by mouth every 7 days Prior to Enbrel Injections to prevent skin reaction), Disp: , Rfl:      fluticasone (FLONASE) 50 MCG/ACT nasal spray, Spray 1 spray into both nostrils daily, Disp: , Rfl:      fluticasone-salmeterol (ADVAIR) 500-50 MCG/DOSE inhaler, Inhale 1 puff into the lungs every 12 hours, Disp: 3 Inhaler, Rfl: 3     glipiZIDE (GLUCOTROL XL) 5 MG 24 hr tablet, Take 1 tablet (5 mg) by mouth daily, Disp: 90 tablet, Rfl: 1     lamoTRIgine (LAMICTAL) 100 MG tablet, Take 200 mg by mouth daily, Disp: , Rfl:      levothyroxine (SYNTHROID/LEVOTHROID) 75 MCG tablet, TAKE 1 TABLET EVERY MORNING, Disp: 90 tablet, Rfl: 0     lidocaine (XYLOCAINE) 5 % external ointment, Apply topically 4 times daily as needed (pain), Disp: 50 g, Rfl: 2     melatonin 3 MG tablet, , Disp: , Rfl:      metoprolol succinate ER (TOPROL-XL) 200 MG 24 hr tablet, Take 1 tablet (200 mg) by mouth 2 times daily,  Disp: 180 tablet, Rfl: 1     montelukast (SINGULAIR) 10 MG tablet, Take 1 tablet (10 mg) by mouth every evening, Disp: 90 tablet, Rfl: 1     nabumetone (RELAFEN) 500 MG tablet, Take 1-2 tablets (500-1,000 mg) by mouth 2 times daily (with meals) as needed for back/joint pain., Disp: 60 tablet, Rfl: 5     naloxone (NARCAN) 4 MG/0.1ML nasal spray, Spray 1 spray (4 mg) into one nostril alternating nostrils as needed for opioid reversal every 2-3 minutes until assistance arrives, Disp: 0.2 mL, Rfl: 0     omega-3 acid ethyl esters (LOVAZA) 1 g capsule, TAKE 2 CAPSULES BY MOUTH TWICE DAILY., Disp: 360 capsule, Rfl: 1     omeprazole 20 MG tablet, Take 20 mg by mouth daily , Disp: , Rfl:      ondansetron (ZOFRAN-ODT) 8 MG ODT tab, Take 1 tablet (8 mg) by mouth every 8 hours as needed for nausea, Disp: 30 tablet, Rfl: 3     order for DME, Equipment being ordered: Assure Compression stockings (ANNETTA) Large, closed toe, thigh-high 30-40 compression, Disp: 2 Units, Rfl: 3     order for DME, Equipment being ordered: lumbosacral belt/brace, Disp: 1 Units, Rfl: 0     order for DME, Respironics REMSTAR 60 Series Auto CPAP 9-13 cm H2O, Wisp nasal mask w/a large cushion and a chinstrap, Disp: , Rfl:      oxyCODONE (ROXICODONE) 5 MG tablet, Take 1-2 tablets (5-10 mg) by mouth every 6 hours as needed for pain (maximum 4 tablet(s) per day), Disp: 35 tablet, Rfl: 0     polyethylene glycol (MIRALAX) 17 g packet, Take 1 packet by mouth daily, Disp: , Rfl:      pregabalin (LYRICA) 150 MG capsule, Take 1 capsule (150 mg) by mouth 2 times daily, Disp: 60 capsule, Rfl: 11     QUEtiapine (SEROQUEL) 25 MG tablet, Taking 75 MG @ HS per pt report, Disp: , Rfl:      ramipril (ALTACE) 10 MG capsule, Take 1 capsule (10 mg) by mouth daily, Disp: 90 capsule, Rfl: 1     Rectal Protectant-Emollient (CALMOL-4) 76-10 % SUPP, Place 1 suppository rectally 3 times daily as needed , Disp: , Rfl:      rizatriptan (MAXALT-MLT) 5 MG ODT, DISSOLVE 1 TABLET BY MOUTH  AT ONSET OF HEADACHE, Disp: 30 tablet, Rfl: 0     rosuvastatin (CRESTOR) 40 MG tablet, Take 1 tablet (40 mg) by mouth daily, Disp: 90 tablet, Rfl: 0     syringe, disposable, (BD TUBERCULIN SYRINGE) 1 ML MISC, Equipment being ordered: 1 ml tuberculin syringes to be used for Vitamin B12 injections., Disp: 12 each, Rfl: 11     vitamin B complex with vitamin C (STRESS TAB) tablet, Take 1 tablet by mouth daily, Disp: , Rfl:      ZINC SULFATE-VITAMIN C MT, Take 1 tablet by mouth daily, Disp: , Rfl:   Psychiatry staffing: case discussed  Diagnosis:    As above, depressed mostly.  Pain issues.

## 2021-01-19 NOTE — GROUP NOTE
Psychotherapy Group Note    PATIENT'S NAME: Joel Pineda  MRN:   [id number]  :   1975  ACCT. NUMBER: [id number]  DATE OF SERVICE: 21  START TIME: 10:00 AM  END TIME: 10:50 AM  FACILITATOR: Lenore French [facility]  TOPIC: MH EBP Group: Coping Skills  [facility] Adult Mental Health Day Treatment  TRACK: 6A    NUMBER OF PARTICIPANTS: 6    Telemedicine Visit: The patient's condition can be safely assessed and treated via synchronous audio and visual telemedicine encounter.      Reason for Telemedicine Visit: Services only offered telehealth    Originating Site (Patient Location): Patient's home    Distant Site (Provider Location): Provider Remote Setting    Consent:  The patient/guardian has verbally consented to: the potential risks and benefits of telemedicine (video visit) versus in person care; bill my insurance or make self-payment for services provided; and responsibility for payment of non-covered services.     Mode of Communication:  Video Conference via [employer]    As the provider I attest to compliance with applicable laws and regulations related to telemedicine.      Summary of Group / Topics Discussed:  Coping Skills: Self-Soothe: Patients learned to apply self-soothe as a way to decrease heightened stress in the moment.  Patients identified situations that necessitate self-soothe strategies.  They focused on ways to manage physical symptoms of distress using the senses. They discussed how to distinguish when this can be useful in their lives when other strategies are more relevant or helpful.    Patient Session Goals / Objectives:    Understand the purpose of using the senses to decrease distress    Process what happens in the body when using self-soothe strategies    Demonstrate understanding of when to use self-soothe strategies    Identify and problem solve barriers to applying self-soothe strategies.    Choose 1-2 self-soothe strategies to apply during times of  distress.        Patient Participation / Response:  Fully participated with the group by sharing personal reflections / insights and openly received / provided feedback with other participants.    Demonstrated understanding of topics discussed through group discussion and participation, Expressed understanding of the relevance / importance of coping skills at distressing times in life, Demonstrated knowledge of when to consider using a variety of coping skills in daily life and Identified barriers to applying coping skills    Treatment Plan:  Patient has a current master individualized treatment plan.  See Epic treatment plan for more information.    Lenore French, [facility]

## 2021-01-19 NOTE — GROUP NOTE
Psychotherapy Group Note    PATIENT'S NAME: Joel Pineda  MRN:   [id number]  :   1975  ACCT. NUMBER: [id number]  DATE OF SERVICE: 21  START TIME: 11:00 AM  END TIME: 11:50 AM  FACILITATOR: Morenita Lau LPCC  TOPIC: MH EBP Group: Coping Skills  [facility] Adult Mental Health Day Treatment  TRACK: 6A    NUMBER OF PARTICIPANTS: 6    Summary of Group / Topics Discussed:  Coping Skills: Meditation: Patients learned about meditation and explored how and when to utilize it to increase focus, reduce mental health symptoms, decrease physical tension, and improve mental well-being.  Approaches to meditation were presented as a means of increasing self-awareness. The benefits of various meditation practices were discussed, as well as barriers to utilization of this coping strategy.     Patient Session Goals / Objectives:    Understand the purpose and efficacy of using meditation modalities to reduce stress / symptoms.    Review / discuss situations in daily life that cause distress, where establishing a meditation routine or meditating as needed may improve functioning.      Verbalize understanding of how and when to apply grounding strategies to reduce distress and increase presence in the moment.    Practice meditation and address barriers to use in daily life.    Telemedicine Visit: The patient's condition can be safely assessed and treated via synchronous audio and visual telemedicine encounter.      Reason for Telemedicine Visit: Services only offered telehealth and due to COVID-19    Originating Site (Patient Location): Patient's home    Distant Site (Provider Location): Provider Remote Setting    Consent:  The patient/guardian has verbally consented to: the potential risks and benefits of telemedicine (video visit) versus in person care; bill my insurance or make self-payment for services provided; and responsibility for payment of non-covered services.     Mode of Communication:  Video  Conference via [employer]    As the provider I attest to compliance with applicable laws and regulations related to telemedicine.        Patient Participation / Response:  Moderately participated, sharing some personal reflections / insights and adequately adequately received / provided feedback with other participants.    Demonstrated understanding of topics discussed through group discussion and participation, Expressed understanding of the relevance / importance of coping skills at distressing times in life and Demonstrated knowledge of when to consider using a variety of coping skills in daily life    Treatment Plan:  Patient has a current master individualized treatment plan.  See Epic treatment plan for more information.    Morenita Lau, [facility]

## 2021-01-20 ENCOUNTER — HOSPITAL ENCOUNTER (OUTPATIENT)
Dept: BEHAVIORAL HEALTH | Facility: CLINIC | Age: 46
End: 2021-01-20
Attending: PSYCHIATRY & NEUROLOGY
Payer: MEDICARE

## 2021-01-20 PROCEDURE — 90853 GROUP PSYCHOTHERAPY: CPT | Mod: GT | Performed by: COUNSELOR

## 2021-01-20 PROCEDURE — 90853 GROUP PSYCHOTHERAPY: CPT | Mod: PO | Performed by: PSYCHOLOGIST

## 2021-01-20 NOTE — GROUP NOTE
Psychotherapy Group Note    PATIENT'S NAME: Joel Pineda  MRN:   [id number]  :   1975  ACCT. NUMBER: [id number]  DATE OF SERVICE: 21  START TIME: 11:00 AM  END TIME: 11:50 AM  FACILITATOR: Ramakrishna Echols LP  TOPIC:  EBP Group: Coping Skills  [facility] Adult Mental Health Day Treatment  TRACK: 6A    NUMBER OF PARTICIPANTS: 6    Telemedicine Visit: The patient's condition can be safely assessed and treated via synchronous audio and visual telemedicine encounter.      Reason for Telemedicine Visit: Services only offered telehealth    Originating Site (Patient Location): Patient's home    Distant Site (Provider Location): Provider Remote Setting    Consent:  The patient/guardian has verbally consented to: the potential risks and benefits of telemedicine (video visit) versus in person care; bill my insurance or make self-payment for services provided; and responsibility for payment of non-covered services.     Mode of Communication:  Video Conference via [employer]    As the provider I attest to compliance with applicable laws and regulations related to telemedicine.      Summary of Group / Topics Discussed:  Coping Skills: Radical Acceptance: Patients learned radical acceptance as a way to tolerate heightened stress in the moment.  Patients identified situations that necessitate radical acceptance.  They focused on applying acceptance of the moment to tolerate difficult emotions and events. Patients discussed how to distinguish when this can be useful in their lives and when other skills are more relevant or helpful.    Patient Session Goals / Objectives:    Understand that some amount of pain exists for each of us in our lives    Process the difficulty of acceptance in our lives and benefits of radical acceptance to mood and functioning.    Demonstrate understanding of when to use the radical acceptance to tolerate distress by providing examples of situations where this could be  applied.    Identify barriers to applying radical acceptance to difficult situations and explore strategies to overcome them        Patient Participation / Response:  Fully participated with the group by sharing personal reflections / insights and openly received / provided feedback with other participants.    Expressed understanding of the relevance / importance of coping skills at distressing times in life    Treatment Plan:  Patient has a current master individualized treatment plan.  See Epic treatment plan for more information.    aRmakrishna Echols, LP

## 2021-01-20 NOTE — ADDENDUM NOTE
Encounter addended by: Ramakrishna Echols LP on: 1/20/2021 11:57 AM   Actions taken: Clinical Note Signed

## 2021-01-20 NOTE — GROUP NOTE
"Process Group Note    PATIENT'S NAME: Joel Pineda  MRN:   [id number]  :   1975  ACCT. NUMBER: [id number]  DATE OF SERVICE: 21  START TIME: 10:00 AM  END TIME: 10:50 AM  FACILITATOR: Lenore French [facility]  TOPIC:  Process Group    Diagnoses:  296.33 (F33.2) Major Depressive Disorder, Recurrent Episode, Severe _.  4. Other Diagnoses that is relevant to services:   300.00 (F41.9) Unspecified Anxiety Disorder  301.83 (F60.3) Borderline Personality Disorder.      [facility] Mental Health Day Treatment  TRACK: 6A    NUMBER OF PARTICIPANTS: 6    Telemedicine Visit: The patient's condition can be safely assessed and treated via synchronous audio and visual telemedicine encounter.      Reason for Telemedicine Visit: Services only offered telehealth    Originating Site (Patient Location): Patient's home    Distant Site (Provider Location): Provider Remote Setting    Consent:  The patient/guardian has verbally consented to: the potential risks and benefits of telemedicine (video visit) versus in person care; bill my insurance or make self-payment for services provided; and responsibility for payment of non-covered services.     Mode of Communication:  Video Conference via [employer]    As the provider I attest to compliance with applicable laws and regulations related to telemedicine.            Data:    Session content: At the start of this group, patients were invited to check in by identifying themselves, describing their current emotional status, and identifying issues to address in this group.   Area(s) of treatment focus addressed in this session included Symptom Management and Personal Safety.    Tito reported feeling \"frustrated\" and \"irritated\" today.  His goal for the day is to look into kittens to adopt.  He reported he took his first dose of a new muscle relaxer yesterday, which was helpful, but has him feeling very groggy today.  He took some process time to discuss how much of a " hard time he is doing with the decision about when to put his cat down.       Therapeutic Interventions/Treatment Strategies:  Psychotherapist offered support, feedback and validation and reinforced use of skills.    Assessment:    Patient response:   Patient responded to session by accepting feedback, giving feedback and listening    Possible barriers to participation / learning include: and no barriers identified    Health Issues:   None reported       Substance Use Review:   Substance Use: No active concerns identified.    Mental Status/Behavioral Observations  Appearance:   Appropriate   Eye Contact:   Good   Psychomotor Behavior: Normal   Attitude:   Cooperative   Orientation:   All  Speech   Rate / Production: Normal    Volume:  Normal   Mood:    Depressed   Affect:    Appropriate   Thought Content:   Clear  Thought Form:  Coherent  Logical     Insight:    Good     Plan:     Safety Plan: No current safety concerns identified.  Recommended that patient call 911 or go to the local ED should there be a change in any of these risk factors.     Barriers to treatment: None identified    Patient Contracts (see media tab):  None    Substance Use: Not addressed in session     Continue or Discharge: Patient will continue in Adult Day Treatment (ADT)  as planned. Patient is likely to benefit from learning and using skills as they work toward the goals identified in their treatment plan.      Lenore French, [facility]  January 20, 2021

## 2021-01-21 ENCOUNTER — THERAPY VISIT (OUTPATIENT)
Dept: PHYSICAL THERAPY | Facility: CLINIC | Age: 46
End: 2021-01-21
Payer: MEDICARE

## 2021-01-21 DIAGNOSIS — M79.671 RIGHT FOOT PAIN: ICD-10-CM

## 2021-01-21 PROCEDURE — 97140 MANUAL THERAPY 1/> REGIONS: CPT | Mod: GP | Performed by: PHYSICAL THERAPIST

## 2021-01-21 PROCEDURE — 97035 APP MDLTY 1+ULTRASOUND EA 15: CPT | Mod: GP | Performed by: PHYSICAL THERAPIST

## 2021-01-22 ENCOUNTER — HOSPITAL ENCOUNTER (OUTPATIENT)
Dept: BEHAVIORAL HEALTH | Facility: CLINIC | Age: 46
End: 2021-01-22
Attending: PSYCHIATRY & NEUROLOGY
Payer: MEDICARE

## 2021-01-22 ENCOUNTER — TELEPHONE (OUTPATIENT)
Dept: BEHAVIORAL HEALTH | Facility: CLINIC | Age: 46
End: 2021-01-22

## 2021-01-22 PROCEDURE — 90853 GROUP PSYCHOTHERAPY: CPT | Mod: GT | Performed by: COUNSELOR

## 2021-01-22 NOTE — TELEPHONE ENCOUNTER
Spoke with pt to schedule program provider follow up visit.  Scheduled for 1/26/21 @ 1pm with Dr. Boland

## 2021-01-22 NOTE — GROUP NOTE
"Process Group Note    PATIENT'S NAME: Joel Pineda  MRN:   [id number]  :   1975  ACCT. NUMBER: [id number]  DATE OF SERVICE: 21  START TIME:  9:00 AM  END TIME:  9:50 AM  FACILITATOR: Lenore French [facility]  TOPIC:  Process Group    Diagnoses:  296.33 (F33.2) Major Depressive Disorder, Recurrent Episode, Severe _.  4. Other Diagnoses that is relevant to services:   300.00 (F41.9) Unspecified Anxiety Disorder  301.83 (F60.3) Borderline Personality Disorder.      [facility] Mental Health Day Treatment  TRACK: 6A    NUMBER OF PARTICIPANTS: 6    Telemedicine Visit: The patient's condition can be safely assessed and treated via synchronous audio and visual telemedicine encounter.      Reason for Telemedicine Visit: Services only offered telehealth    Originating Site (Patient Location): Patient's home    Distant Site (Provider Location): Provider Remote Setting    Consent:  The patient/guardian has verbally consented to: the potential risks and benefits of telemedicine (video visit) versus in person care; bill my insurance or make self-payment for services provided; and responsibility for payment of non-covered services.     Mode of Communication:  Video Conference via [employer]    As the provider I attest to compliance with applicable laws and regulations related to telemedicine.            Data:    Session content: At the start of this group, patients were invited to check in by identifying themselves, describing their current emotional status, and identifying issues to address in this group.   Area(s) of treatment focus addressed in this session included Symptom Management and Personal Safety.    Tito reported feeling \"optimistic\" today because he is seeing a new othopedist on Monday.  His goal for the day is to talk to his vet about when or if to put his cat down.  His other goal is to just manange his pain over the weekend.  Client declined additional process time but contributed to " group discussion and provided feedback and support to peers.      Therapeutic Interventions/Treatment Strategies:  Psychotherapist offered support, feedback and validation.    Assessment:    Patient response:   Patient responded to session by accepting feedback, giving feedback and listening    Possible barriers to participation / learning include: and no barriers identified    Health Issues:   None reported       Substance Use Review:   Substance Use: No active concerns identified.    Mental Status/Behavioral Observations  Appearance:   Appropriate   Eye Contact:   Good   Psychomotor Behavior: Normal   Attitude:   Cooperative   Orientation:   All  Speech   Rate / Production: Normal    Volume:  Normal   Mood:    Depressed   Affect:    Appropriate   Thought Content:   Clear  Thought Form:  Coherent  Logical     Insight:    Good     Plan:     Safety Plan: No current safety concerns identified.  Recommended that patient call 911 or go to the local ED should there be a change in any of these risk factors.     Barriers to treatment: None identified    Patient Contracts (see media tab):  None    Substance Use: Provided encouragement towards sobriety    Provided support and affirmation for steps taken towards sobriety      Continue or Discharge: Patient will continue in Adult Day Treatment (ADT)  as planned. Patient is likely to benefit from learning and using skills as they work toward the goals identified in their treatment plan.      Lenore French, [facility]  January 22, 2021

## 2021-01-22 NOTE — GROUP NOTE
Psychotherapy Group Note    PATIENT'S NAME: Joel Pineda  MRN:   [id number]  :   1975  ACCT. NUMBER: [id number]  DATE OF SERVICE: 21  START TIME: 10:00 AM  END TIME: 10:50 AM  FACILITATOR: Morenita Lau LPCC  TOPIC: MH EBP Group: Coping Skills  [facility] Adult Mental Health Day Treatment  TRACK: 6A    NUMBER OF PARTICIPANTS: 6    Summary of Group / Topics Discussed:  Coping Skills: Meditation: Patients learned about meditation and explored how and when to utilize it to increase focus, reduce mental health symptoms, decrease physical tension, and improve mental well-being.  Approaches to meditation were presented as a means of increasing self-awareness. The benefits of various meditation practices were discussed, as well as barriers to utilization of this coping strategy.     Patient Session Goals / Objectives:    Understand the purpose and efficacy of using meditation modalities to reduce stress / symptoms.    Review / discuss situations in daily life that cause distress, where establishing a meditation routine or meditating as needed may improve functioning.      Verbalize understanding of how and when to apply grounding strategies to reduce distress and increase presence in the moment.    Practice meditation and address barriers to use in daily life.        Patient Participation / Response:  Fully participated with the group by sharing personal reflections / insights and openly received / provided feedback with other participants.    Demonstrated understanding of topics discussed through group discussion and participation, Expressed understanding of the relevance / importance of coping skills at distressing times in life and Demonstrated knowledge of when to consider using a variety of coping skills in daily life    Treatment Plan:  Patient has a current master individualized treatment plan.  See Epic treatment plan for more information.    JUDSON Hanson

## 2021-01-22 NOTE — GROUP NOTE
Psychotherapy Group Note    PATIENT'S NAME: Joel Pineda  MRN:   [id number]  :   1975  ACCT. NUMBER: [id number]  DATE OF SERVICE: 21  START TIME: 11:00 AM  END TIME: 11:50 AM  FACILITATOR: Morenita Lau LPCC  TOPIC: MH EBP Group: Coping Skills  [facility] Adult Mental Health Day Treatment  TRACK: 6A    NUMBER OF PARTICIPANTS: 6    Summary of Group / Topics Discussed:  Coping Skills: Improve the Moment: Patients learned to tolerate distress, by applying strategies to effect positive change in the present moment.  Patients will identified situations where they would benefit from applying strategies to improve the moment and reduce distress. Patients discussed how to distinguish when this can be useful in their lives or when other strategies would be more relevant or helpful.    Patient Session Goals / Objectives:    Discuss how the use of intentional  in the moment  actions can help reduce distress.    Review patients current practices and discuss a more formal way of practicing and accessing skills.    Increase ability to decide when to use improve the moment strategies    Choose 1-2 in the moment actions to apply during times of distress.    Telemedicine Visit: The patient's condition can be safely assessed and treated via synchronous audio and visual telemedicine encounter.      Reason for Telemedicine Visit: Services only offered telehealth and due to COVID-19    Originating Site (Patient Location): Patient's home    Distant Site (Provider Location): Provider Remote Setting    Consent:  The patient/guardian has verbally consented to: the potential risks and benefits of telemedicine (video visit) versus in person care; bill my insurance or make self-payment for services provided; and responsibility for payment of non-covered services.     Mode of Communication:  Video Conference via [employer]    As the provider I attest to compliance with applicable laws and regulations related to  telemedicine.        Patient Participation / Response:  Fully participated with the group by sharing personal reflections / insights and openly received / provided feedback with other participants.    Demonstrated understanding of topics discussed through group discussion and participation, Expressed understanding of the relevance / importance of coping skills at distressing times in life, Demonstrated knowledge of when to consider using a variety of coping skills in daily life and Identified barriers to applying coping skills    Treatment Plan:  Patient has a current master individualized treatment plan.  See Epic treatment plan for more information.    Morenita Lau, [facility]

## 2021-01-25 ENCOUNTER — OFFICE VISIT (OUTPATIENT)
Dept: ORTHOPEDICS | Facility: CLINIC | Age: 46
End: 2021-01-25
Payer: MEDICARE

## 2021-01-25 ENCOUNTER — ANCILLARY PROCEDURE (OUTPATIENT)
Dept: GENERAL RADIOLOGY | Facility: CLINIC | Age: 46
End: 2021-01-25
Attending: PEDIATRICS
Payer: MEDICARE

## 2021-01-25 VITALS
SYSTOLIC BLOOD PRESSURE: 137 MMHG | HEIGHT: 78 IN | DIASTOLIC BLOOD PRESSURE: 89 MMHG | BODY MASS INDEX: 36.45 KG/M2 | WEIGHT: 315 LBS

## 2021-01-25 DIAGNOSIS — M50.30 DEGENERATION OF CERVICAL INTERVERTEBRAL DISC: ICD-10-CM

## 2021-01-25 DIAGNOSIS — M54.2 PAIN OF CERVICAL SPINE: Primary | ICD-10-CM

## 2021-01-25 DIAGNOSIS — M54.2 PAIN OF CERVICAL SPINE: ICD-10-CM

## 2021-01-25 DIAGNOSIS — R51.9 LEFT-SIDED FACE PAIN: ICD-10-CM

## 2021-01-25 PROCEDURE — 72040 X-RAY EXAM NECK SPINE 2-3 VW: CPT | Performed by: RADIOLOGY

## 2021-01-25 PROCEDURE — 99214 OFFICE O/P EST MOD 30 MIN: CPT | Performed by: PEDIATRICS

## 2021-01-25 ASSESSMENT — MIFFLIN-ST. JEOR: SCORE: 2573.16

## 2021-01-25 NOTE — LETTER
1/25/2021         RE: Joel Pineda  [id number] [place] [address]        Dear Colleague,    Thank you for referring your patient, Joel Pineda, to the [facility]. Please see a copy of my visit note below.    Sports Medicine Clinic Visit - Interim History January 25, 2021    PCP: Ksenia Lyles    Joel Pineda is a 45 year old male who is seen in f/u up for    Pain of cervical spine  Degeneration of cervical intervertebral disc  Left-sided face pain.  Since last visit on 11/15/18 patient has started having new cervical symptoms. 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 He reports an injury 3 months ago where he fell out of bed injuring his neck and back. He reports weakness and pain at his neck. He has episodes of difficulty holding his head up, it will drop forward. He also describes a sensations of tightness in his face on the left side. Has had an episode of postherpetic neuralgia in the past in this area. No prior seizure history or episodes of head dropping. Denies pain into his arm, numbness or tingling. When his head drops, he has cracking in his neck.    Symptoms are better with: Ice and stretching. baclofen, oxycodone  Symptoms are worse with: symptoms progress as the day progresses, worse in the evening  Additional Features:   Positive: weakness   Negative: swelling, bruising, popping, grinding, catching, locking, instability, paresthesias and numbness    Social History: Disability    Review of Systems  Skin: no bruising, no swelling  Musculoskeletal: as above  Neurologic: no numbness, paresthesias  Remainder of review of systems is negative including constitutional, CV, pulmonary, GI, except as noted in HPI or medical history.    Patient's current problem list, past medical and surgical history, and family history were reviewed.    Patient Active Problem List   Diagnosis     Major depressive disorder,  recurrent episode (H)     Intermittent asthma     Hyperlipidemia LDL goal <100     Chronic nonallergic rhinitis     Diverticulosis     GERD (gastroesophageal reflux disease)     Anxiety     LLOYD (obstructive sleep apnea)- mild (AHI 11)     Intracranial arachnoid cyst     Facet arthritis of cervical region     Acquired hypothyroidism     Bipolar 2 disorder (H)     Chronic midline low back pain without sciatica     Irritable bowel syndrome with diarrhea     B12 deficiency     Essential hypertension with goal blood pressure less than 140/90     Chronic pain syndrome     Ankylosing spondylitis of sacral region (H)     Morbid obesity due to hypertriglyceridemia (H)     Fatty infiltration of liver     DDD (degenerative disc disease), lumbar     Type 2 diabetes mellitus with complication, without long-term current use of insulin (H)     Peripheral polyneuropathy     History of pulmonary embolism     Ingrown toenail     Hypertriglyceridemia     Gastroparesis     Orthostatic dizziness     POTS (postural orthostatic tachycardia syndrome)     PHN (postherpetic neuralgia)     Dysautonomia (H)     Major depressive disorder, recurrent episode, severe (H)     Right foot pain     Past Medical History:   Diagnosis Date     Acne      Acquired hypothyroidism      Allergic state      Ankylosing spondylitis lumbar region (H)      Ankylosing spondylitis of sacral region (H)      Anxiety      Bipolar 2 disorder (H)      Chest pain     Chest pain, regulated w/BP meds. Clear arteries.     Chronic pain      DDD (degenerative disc disease), lumbar      Depressive disorder      Diabetes (H)      Diverticulosis      Facet arthritis of cervical region      Gastroesophageal reflux disease      Hypertension      IBS (irritable bowel syndrome)      Intracranial arachnoid cyst      Polyneuropathy      Pulmonary embolism (H)      Skin exam, screening for cancer 12/3/2013     Sleep apnea      Uncomplicated asthma      Past Surgical History:   Procedure  "Laterality Date     BACK SURGERY  10/07    lumbar discectomy L5-S1     COLONOSCOPY      Note: colonoscopy scheduled with [facility] on Friday, 9/4/15     COSMETIC SURGERY  2012    Nose Exterior - functional     GI SURGERY  August 2013    Sigmoidectomy     HERNIA REPAIR, UMBILICAL  8/23/11    small, Dr. Evan Beavers     INCISION AND DRAINAGE, ABSCESS, COMPLEX  8/23/11    umbilical, Dr. Evan Beavers     LAPAROSCOPIC ASSISTED COLECTOMY LEFT (DESCENDING)  8/15/2013    Procedure: LAPAROSCOPIC ASSISTED COLECTOMY LEFT (DESCENDING);  Laparoscopic Hand Assisted Sigmoid Resection, Mobilization of Splenic Fissure, coloproctoscopy, *Latex Free Room* Anesthesia General with Pain block  ;  Surgeon: Aurora Justice MD;  Location: [facility] OR     NERVE SURGERY  8/18/11    RF ablation @ L3-S1 @ [facility]     RECONSTRUCT NOSE AND SEPTUM (FUNCTIONAL)  10/14/2011    Procedure:RECONSTRUCT NOSE AND SEPTUM (FUNCTIONAL); Functional Septorhinoplasty, Turbinate Reduction, ; Surgeon:CEDRIC CUEVAS; Location:[facility] OR     SINUS SURGERY  10/1/01    ethmoidectomy chronic sinusitis     Family History   Problem Relation Age of Onset     Musculoskeletal Disorder Mother         back     Anxiety Disorder Mother      Colon Polyps Mother      Ulcerative Colitis Mother         and ischemic small intestine, surgery     Hypertension Mother      Breast Cancer Mother      Osteoporosis Mother      Diabetes Mother         Type 2, Diagnosed in 2014     Depression Mother         Takes Cymbalta to help with chronic pain + depx     Thyroid Disease Mother         Hypothyroidism     Obesity Mother         Under much better control latter half of 2015     Musculoskeletal Disorder Father         back     Substance Abuse Father      Hypertension Father      Hyperlipidemia Father      Depression Father         Off meds for many years. Seems \"ok\"     Heart Disease Maternal Grandmother      Heart Disease Maternal Grandfather      Psychotic Disorder Paternal Grandfather      Suicide " "Paternal Grandfather      Depression Paternal Grandfather         Pediatrician. Committed suicide by pistol in 1990.     Musculoskeletal Disorder Brother         back     Depression Brother         Expressed as anger and moodiness     Substance Abuse Brother      Substance Abuse Sister      Depression Sister         Mental Health Therapist, yet no anti-depressants?     Anxiety Disorder Sister         Mental Health Therapist, yet no anti-anxiety meds?     Other Cancer Other         Bladder Cancer - Fatal     Substance Abuse Brother      Colon Cancer No family hx of      Crohn's Disease No family hx of      Anesthesia Reaction No family hx of      Cancer No family hx of         No family history of skin cancer       Objective  /89   Ht 1.981 m (6' 6\")   Wt (!) 156.5 kg (345 lb)   BMI 39.87 kg/m      GENERAL APPEARANCE: healthy, alert and no distress   GAIT: NORMAL  SKIN: no suspicious lesions or rashes  HEENT: Sclera clear, anicteric  CV: good peripheral pulses  RESP: Breathing not labored  NEURO: Normal strength and tone, mentation intact and speech normal  PSYCH:  mentation appears normal and affect normal/bright    Cervical Spine Exam  Inspection:       No visible deformity    Posture:      rounded shoulders and upper back    Tender:      none    Non-Tender:      remainder of cervical spine area    Range of Motion:       Full active and passive ROM forward flexion, extension, lateral rotation, lateral flexion.    Painful Motions:      none    Strength:     C4 (shoulder shrug)  symmetric 5/5       C5 (shoulder abduction) symmetric 5/5       C6 (elbow flexion) symmetric 5/5       C7 (elbow extension) symmetric 5/5       C8 (finger abduction, thumb flexion) symmetric 5/5    Reflexes:      C5 (biceps) symmetric normal       C6 (supinator) symmetric normal       C7 (triceps) symmetric normal    Sensation:     grossly intact througout bilateral upper extremities    Special Tests:      neg (-) Spurling    Skin:   "   well perfused       capillary refill brisk    Lymphatics:      no edema noted in the upper extremities     Radiology  I ordered, visualized and reviewed these images with the patient  CERVICAL SPINE TWO TO THREE VIEWS  1/25/2021 3:30 PM   HISTORY: Pain of cervical spine.  COMPARISON: November 15, 2018                                                           IMPRESSION: Cervical vertebrae are normally aligned. Minimal  intervertebral disc space narrowing at C4-C6 levels. No prevertebral  soft tissue swelling. No acute fracture. Overall, unchanged.    Assessment:  1. Pain of cervical spine    2. Degeneration of cervical intervertebral disc    3. Left-sided face pain      Given neck pain, will obtain MRI.  Discussed that it's unlikely facial symptoms are related to neck pain - possibly related to postherpetic neuralgia, recommend follow up with primary care. Unsure if head dropping episodes are related either.    Plan:  - Today's Plan of Care:  MRI of the Cervical Spine - Call [phone] to schedule MRI  Follow up with Primary Care    Follow Up: In clinic with Dr. Hodgson after MRI (wait at least 1-2 days) (virtual ok)    Concerning signs and symptoms were reviewed.  The patient expressed understanding of this management plan and all questions were answered at this time.    Farida Hodgson MD [facility]  Primary Care Sports Medicine  [facility]      Again, thank you for allowing me to participate in the care of your patient.        Sincerely,        Farida Hodgson MD

## 2021-01-25 NOTE — PROGRESS NOTES
Sports Medicine Clinic Visit - Interim History January 25, 2021    PCP: Ksenia Lyles SONIA Pineda is a 45 year old male who is seen in f/u up for    Pain of cervical spine  Degeneration of cervical intervertebral disc  Left-sided face pain.  Since last visit on 11/15/18 patient has started having new cervical symptoms. 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 He reports an injury 3 months ago where he fell out of bed injuring his neck and back. He reports weakness and pain at his neck. He has episodes of difficulty holding his head up, it will drop forward. He also describes a sensations of tightness in his face on the left side. Has had an episode of postherpetic neuralgia in the past in this area. No prior seizure history or episodes of head dropping. Denies pain into his arm, numbness or tingling. When his head drops, he has cracking in his neck.    Symptoms are better with: Ice and stretching. baclofen, oxycodone  Symptoms are worse with: symptoms progress as the day progresses, worse in the evening  Additional Features:   Positive: weakness   Negative: swelling, bruising, popping, grinding, catching, locking, instability, paresthesias and numbness    Social History: Disability    Review of Systems  Skin: no bruising, no swelling  Musculoskeletal: as above  Neurologic: no numbness, paresthesias  Remainder of review of systems is negative including constitutional, CV, pulmonary, GI, except as noted in HPI or medical history.    Patient's current problem list, past medical and surgical history, and family history were reviewed.    Patient Active Problem List   Diagnosis     Major depressive disorder, recurrent episode (H)     Intermittent asthma     Hyperlipidemia LDL goal <100     Chronic nonallergic rhinitis     Diverticulosis     GERD (gastroesophageal reflux disease)     Anxiety     LLOYD (obstructive sleep apnea)- mild (AHI 11)     Intracranial arachnoid cyst      Facet arthritis of cervical region     Acquired hypothyroidism     Bipolar 2 disorder (H)     Chronic midline low back pain without sciatica     Irritable bowel syndrome with diarrhea     B12 deficiency     Essential hypertension with goal blood pressure less than 140/90     Chronic pain syndrome     Ankylosing spondylitis of sacral region (H)     Morbid obesity due to hypertriglyceridemia (H)     Fatty infiltration of liver     DDD (degenerative disc disease), lumbar     Type 2 diabetes mellitus with complication, without long-term current use of insulin (H)     Peripheral polyneuropathy     History of pulmonary embolism     Ingrown toenail     Hypertriglyceridemia     Gastroparesis     Orthostatic dizziness     POTS (postural orthostatic tachycardia syndrome)     PHN (postherpetic neuralgia)     Dysautonomia (H)     Major depressive disorder, recurrent episode, severe (H)     Right foot pain     Past Medical History:   Diagnosis Date     Acne      Acquired hypothyroidism      Allergic state      Ankylosing spondylitis lumbar region (H)      Ankylosing spondylitis of sacral region (H)      Anxiety      Bipolar 2 disorder (H)      Chest pain     Chest pain, regulated w/BP meds. Clear arteries.     Chronic pain      DDD (degenerative disc disease), lumbar      Depressive disorder      Diabetes (H)      Diverticulosis      Facet arthritis of cervical region      Gastroesophageal reflux disease      Hypertension      IBS (irritable bowel syndrome)      Intracranial arachnoid cyst      Polyneuropathy      Pulmonary embolism (H)      Skin exam, screening for cancer 12/3/2013     Sleep apnea      Uncomplicated asthma      Past Surgical History:   Procedure Laterality Date     BACK SURGERY  10/07    lumbar discectomy L5-S1     COLONOSCOPY      Note: colonoscopy scheduled with [facility] on Friday, 9/4/15     COSMETIC SURGERY  2012    Nose Exterior - functional     GI SURGERY  August 2013    Sigmoidectomy     HERNIA REPAIR,  "UMBILICAL  8/23/11    Dr. Evan whiting     INCISION AND DRAINAGE, ABSCESS, COMPLEX  8/23/11    umbilical, Dr. Evan Beavers     LAPAROSCOPIC ASSISTED COLECTOMY LEFT (DESCENDING)  8/15/2013    Procedure: LAPAROSCOPIC ASSISTED COLECTOMY LEFT (DESCENDING);  Laparoscopic Hand Assisted Sigmoid Resection, Mobilization of Splenic Fissure, coloproctoscopy, *Latex Free Room* Anesthesia General with Pain block  ;  Surgeon: Aurora Justice MD;  Location: [facility] OR     NERVE SURGERY  8/18/11    RF ablation @ L3-S1 @ [facility]     RECONSTRUCT NOSE AND SEPTUM (FUNCTIONAL)  10/14/2011    Procedure:RECONSTRUCT NOSE AND SEPTUM (FUNCTIONAL); Functional Septorhinoplasty, Turbinate Reduction, ; Surgeon:CEDRIC CUEVAS; Location:[facility] OR     SINUS SURGERY  10/1/01    ethmoidectomy chronic sinusitis     Family History   Problem Relation Age of Onset     Musculoskeletal Disorder Mother         back     Anxiety Disorder Mother      Colon Polyps Mother      Ulcerative Colitis Mother         and ischemic small intestine, surgery     Hypertension Mother      Breast Cancer Mother      Osteoporosis Mother      Diabetes Mother         Type 2, Diagnosed in 2014     Depression Mother         Takes Cymbalta to help with chronic pain + depx     Thyroid Disease Mother         Hypothyroidism     Obesity Mother         Under much better control latter half of 2015     Musculoskeletal Disorder Father         back     Substance Abuse Father      Hypertension Father      Hyperlipidemia Father      Depression Father         Off meds for many years. Seems \"ok\"     Heart Disease Maternal Grandmother      Heart Disease Maternal Grandfather      Psychotic Disorder Paternal Grandfather      Suicide Paternal Grandfather      Depression Paternal Grandfather         Pediatrician. Committed suicide by pistol in 1990.     Musculoskeletal Disorder Brother         back     Depression Brother         Expressed as anger and moodiness     Substance Abuse Brother      " "Substance Abuse Sister      Depression Sister         Mental Health Therapist, yet no anti-depressants?     Anxiety Disorder Sister         Mental Health Therapist, yet no anti-anxiety meds?     Other Cancer Other         Bladder Cancer - Fatal     Substance Abuse Brother      Colon Cancer No family hx of      Crohn's Disease No family hx of      Anesthesia Reaction No family hx of      Cancer No family hx of         No family history of skin cancer       Objective  /89   Ht 1.981 m (6' 6\")   Wt (!) 156.5 kg (345 lb)   BMI 39.87 kg/m      GENERAL APPEARANCE: healthy, alert and no distress   GAIT: NORMAL  SKIN: no suspicious lesions or rashes  HEENT: Sclera clear, anicteric  CV: good peripheral pulses  RESP: Breathing not labored  NEURO: Normal strength and tone, mentation intact and speech normal  PSYCH:  mentation appears normal and affect normal/bright    Cervical Spine Exam  Inspection:       No visible deformity    Posture:      rounded shoulders and upper back    Tender:      none    Non-Tender:      remainder of cervical spine area    Range of Motion:       Full active and passive ROM forward flexion, extension, lateral rotation, lateral flexion.    Painful Motions:      none    Strength:     C4 (shoulder shrug)  symmetric 5/5       C5 (shoulder abduction) symmetric 5/5       C6 (elbow flexion) symmetric 5/5       C7 (elbow extension) symmetric 5/5       C8 (finger abduction, thumb flexion) symmetric 5/5    Reflexes:      C5 (biceps) symmetric normal       C6 (supinator) symmetric normal       C7 (triceps) symmetric normal    Sensation:     grossly intact througout bilateral upper extremities    Special Tests:      neg (-) Spurling    Skin:     well perfused       capillary refill brisk    Lymphatics:      no edema noted in the upper extremities     Radiology  I ordered, visualized and reviewed these images with the patient  CERVICAL SPINE TWO TO THREE VIEWS  1/25/2021 3:30 PM   HISTORY: Pain of " cervical spine.  COMPARISON: November 15, 2018                                                           IMPRESSION: Cervical vertebrae are normally aligned. Minimal  intervertebral disc space narrowing at C4-C6 levels. No prevertebral  soft tissue swelling. No acute fracture. Overall, unchanged.    Assessment:  1. Pain of cervical spine    2. Degeneration of cervical intervertebral disc    3. Left-sided face pain      Given neck pain, will obtain MRI.  Discussed that it's unlikely facial symptoms are related to neck pain - possibly related to postherpetic neuralgia, recommend follow up with primary care. Unsure if head dropping episodes are related either.    Plan:  - Today's Plan of Care:  MRI of the Cervical Spine - Call [phone] to schedule MRI  Follow up with Primary Care    Follow Up: In clinic with Dr. Hodgson after MRI (wait at least 1-2 days) (virtual ok)    Concerning signs and symptoms were reviewed.  The patient expressed understanding of this management plan and all questions were answered at this time.    Farida Hodgson MD CAQ  Primary Care Sports Medicine  [facility]

## 2021-01-25 NOTE — Clinical Note
Recommended follow up with you as well. I don't think I can explain all his current symptoms from cervical spine.

## 2021-01-25 NOTE — PATIENT INSTRUCTIONS
Plan:  - Today's Plan of Care:  MRI of the Cervical Spine - Call [phone] to schedule MRI  Follow up with Primary Care    Follow Up: In clinic with Dr. Hodgson after MRI (wait at least 1-2 days) (virtual ok)    If you have any further questions for your physician or physician s care team you can call [phone] and use option 3 to leave a voice message. Calls received during business hours will be returned same day.

## 2021-01-26 ENCOUNTER — HOSPITAL ENCOUNTER (OUTPATIENT)
Dept: BEHAVIORAL HEALTH | Facility: CLINIC | Age: 46
End: 2021-01-26
Attending: PSYCHIATRY & NEUROLOGY
Payer: MEDICARE

## 2021-01-26 ENCOUNTER — VIRTUAL VISIT (OUTPATIENT)
Dept: FAMILY MEDICINE | Facility: OTHER | Age: 46
End: 2021-01-26

## 2021-01-26 ENCOUNTER — HOSPITAL ENCOUNTER (OUTPATIENT)
Dept: BEHAVIORAL HEALTH | Facility: CLINIC | Age: 46
Discharge: HOME | End: 2021-01-26
Attending: PSYCHIATRY & NEUROLOGY | Admitting: PSYCHIATRY & NEUROLOGY
Payer: MEDICARE

## 2021-01-26 ENCOUNTER — HOSPITAL ENCOUNTER (OUTPATIENT)
Dept: MRI IMAGING | Facility: CLINIC | Age: 46
Discharge: HOME | End: 2021-01-26
Attending: PEDIATRICS | Admitting: PEDIATRICS
Payer: MEDICARE

## 2021-01-26 DIAGNOSIS — M54.2 PAIN OF CERVICAL SPINE: ICD-10-CM

## 2021-01-26 PROCEDURE — 90853 GROUP PSYCHOTHERAPY: CPT | Mod: PO | Performed by: COUNSELOR

## 2021-01-26 PROCEDURE — 99214 OFFICE O/P EST MOD 30 MIN: CPT | Mod: 95 | Performed by: PSYCHIATRY & NEUROLOGY

## 2021-01-26 PROCEDURE — 72141 MRI NECK SPINE W/O DYE: CPT

## 2021-01-26 NOTE — GROUP NOTE
"Process Group Note    PATIENT'S NAME: Joel Pineda  MRN:   [id number]  :   1975  ACCT. NUMBER: [id number]  DATE OF SERVICE: 21  START TIME:  9:00 AM  END TIME:  9:50 AM  FACILITATOR: Lenore [facility]  TOPIC:  Process Group    Diagnoses:  296.33 (F33.2) Major Depressive Disorder, Recurrent Episode, Severe _.  4. Other Diagnoses that is relevant to services:   300.00 (F41.9) Unspecified Anxiety Disorder  301.83 (F60.3) Borderline Personality Disorder.      [facility] Mental Health Day Treatment  TRACK: 6A    NUMBER OF PARTICIPANTS: 8    Telemedicine Visit: The patient's condition can be safely assessed and treated via synchronous audio and visual telemedicine encounter.      Reason for Telemedicine Visit: Services only offered telehealth    Originating Site (Patient Location): Patient's home    Distant Site (Provider Location): Provider Remote Setting    Consent:  The patient/guardian has verbally consented to: the potential risks and benefits of telemedicine (video visit) versus in person care; bill my insurance or make self-payment for services provided; and responsibility for payment of non-covered services.     Mode of Communication:  Video Conference via Abloomy    As the provider I attest to compliance with applicable laws and regulations related to telemedicine.            Data:    Session content: At the start of this group, patients were invited to check in by identifying themselves, describing their current emotional status, and identifying issues to address in this group.   Area(s) of treatment focus addressed in this session included Symptom Management and Personal Safety.    Tito reported feeling \"frustrated\" because of some medical issues he's been dealing with.  His goal for the day is to get his MRI done.  Client declined additional process time but contributed to group discussion and provided feedback and support to peers.      Therapeutic " Interventions/Treatment Strategies:  Psychotherapist offered support, feedback and validation.    Assessment:    Patient response:   Patient responded to session by accepting feedback, giving feedback and listening    Possible barriers to participation / learning include: and no barriers identified    Health Issues:   Yes: Pain, Associated Psychological Distress       Substance Use Review:   Substance Use: No active concerns identified.    Mental Status/Behavioral Observations  Appearance:   Appropriate   Eye Contact:   Good   Psychomotor Behavior: Normal   Attitude:   Cooperative   Orientation:   All  Speech   Rate / Production: Normal    Volume:  Normal   Mood:    Anxious  Depressed   Affect:    Appropriate   Thought Content:   Clear  Thought Form:  Coherent  Logical     Insight:    Good     Plan:     Safety Plan: No current safety concerns identified.  Recommended that patient call 911 or go to the local ED should there be a change in any of these risk factors.     Barriers to treatment: None identified    Patient Contracts (see media tab):  None    Substance Use: Not addressed in session     Continue or Discharge: Patient will continue in Adult Day Treatment (ADT)  as planned. Patient is likely to benefit from learning and using skills as they work toward the goals identified in their treatment plan.      Lenore French, [facility]  January 26, 2021
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
## 2021-01-26 NOTE — GROUP NOTE
Psychotherapy Group Note    PATIENT'S NAME: Joel Pineda  MRN:   [id number]  :   1975  ACCT. NUMBER: [id number]  DATE OF SERVICE: 21  START TIME: 10:00 AM  END TIME: 10:50 AM  FACILITATOR: Lenore French [facility]  TOPIC: MH EBP Group: DDP Relapse Prevention  [facility] Mental Health Day Treatment  TRACK: 6A    NUMBER OF PARTICIPANTS: 8    Telemedicine Visit: The patient's condition can be safely assessed and treated via synchronous audio and visual telemedicine encounter.      Reason for Telemedicine Visit: Services only offered telehealth    Originating Site (Patient Location): Patient's home    Distant Site (Provider Location): Provider Remote Setting    Consent:  The patient/guardian has verbally consented to: the potential risks and benefits of telemedicine (video visit) versus in person care; bill my insurance or make self-payment for services provided; and responsibility for payment of non-covered services.     Mode of Communication:  Video Conference via Jotky    As the provider I attest to compliance with applicable laws and regulations related to telemedicine.      Summary of Group / Topics Discussed:  DDP Relapse Prevention: Relationship Mapping: Patients identified different types of relationships they have in their life and examined if there is conflict or closeness, the degree of conflict or closeness, and the reason for conflict. The goal of this topic is to help patients gain awareness of the relationships they have with others, identify types of conflict in patients  lives and how they impact symptoms/functioning, and identify how they can improve relationships with relationship and interpersonal skills they have learned.     Patient Session Goals / Objectives:    Familiarized patients with awareness to the different types of relationships they may have with different people and substances in their lives    Discussed and practiced strategies to promote healthier  understanding of interpersonal relationships with a focus on awareness of conflict, the causes of conflict, and the ways to transform conflict    Discussed the use of substances and its impact on their relationships      Patient Participation / Response:  Fully participated with the group by sharing personal reflections / insights and openly received / provided feedback with other participants.    Demonstrated understanding of topics discussed through group discussion and participation, Demonstrated understanding of utilizing relapse prevention skills to manage urges and maintain sobriety, Identified / Expressed personal readiness to utilize relapse prevention skills and Verbalized understanding of how relapse prevention skills can assist in maintaining sobriety    Treatment Plan:  Patient has a current master individualized treatment plan.  See Epic treatment plan for more information.    Lenore French, [facility]

## 2021-01-26 NOTE — GROUP NOTE
Psychotherapy Group Note    PATIENT'S NAME: Joel Pineda  MRN:   [id number]  :   1975  ACCT. NUMBER: [id number]  DATE OF SERVICE: 21  START TIME: 11:00 AM  END TIME: 11:50 AM  FACILITATOR: Morenita Lau LPCC  TOPIC: MH EBP Group: Coping Skills  [facility] Adult Partial Hospitalization Program  TRACK: [facility]    NUMBER OF PARTICIPANTS: 8    Summary of Group / Topics Discussed:  Coping Skills: Meditation: Patients learned about meditation and explored how and when to utilize it to increase focus, reduce mental health symptoms, decrease physical tension, and improve mental well-being.  Approaches to meditation were presented as a means of increasing self-awareness. The benefits of various meditation practices were discussed, as well as barriers to utilization of this coping strategy.     Patient Session Goals / Objectives:    Understand the purpose and efficacy of using meditation modalities to reduce stress / symptoms.    Review / discuss situations in daily life that cause distress, where establishing a meditation routine or meditating as needed may improve functioning.      Verbalize understanding of how and when to apply grounding strategies to reduce distress and increase presence in the moment.    Practice meditation and address barriers to use in daily life.    Telemedicine Visit: The patient's condition can be safely assessed and treated via synchronous audio and visual telemedicine encounter.      Reason for Telemedicine Visit: Services only offered telehealth and due to COVID-19    Originating Site (Patient Location): Patient's home    Distant Site (Provider Location): Provider Remote Setting    Consent:  The patient/guardian has verbally consented to: the potential risks and benefits of telemedicine (video visit) versus in person care; bill my insurance or make self-payment for services provided; and responsibility for payment of non-covered services.     Mode of Communication:  Video  Conference via [employer]    As the provider I attest to compliance with applicable laws and regulations related to telemedicine.          Patient Participation / Response:  Fully participated with the group by sharing personal reflections / insights and openly received / provided feedback with other participants.    Demonstrated understanding of topics discussed through group discussion and participation, Expressed understanding of the relevance / importance of coping skills at distressing times in life and Demonstrated knowledge of when to consider using a variety of coping skills in daily life    Treatment Plan:  Patient has a current master individualized treatment plan.  See Epic treatment plan for more information.    Morenita Lau, [facility]

## 2021-01-27 ENCOUNTER — HOSPITAL ENCOUNTER (OUTPATIENT)
Dept: BEHAVIORAL HEALTH | Facility: CLINIC | Age: 46
End: 2021-01-27
Attending: PSYCHIATRY & NEUROLOGY
Payer: MEDICARE

## 2021-01-27 PROCEDURE — 90853 GROUP PSYCHOTHERAPY: CPT | Mod: PO | Performed by: COUNSELOR

## 2021-01-27 NOTE — RESULT ENCOUNTER NOTE
These results were discussed during office visit.    Farida Hodgson MD, CAQ  Primary Care Sports Medicine  [facility]

## 2021-01-27 NOTE — GROUP NOTE
Psychotherapy Group Note    PATIENT'S NAME: Joel Pineda  MRN:   [id number]  :   1975  ACCT. NUMBER: [id number]  DATE OF SERVICE: 21  START TIME: 11:00 AM  END TIME: 11:50 AM  FACILITATOR: Morenita Lau LPCC  TOPIC: MH EBP Group: Behavioral Activation  [facility] Adult Mental Health Day Treatment  TRACK: 6A    NUMBER OF PARTICIPANTS: 7    Summary of Group / Topics Discussed:  Behavioral Activation: Activity Scheduling:Patients explored how they currently spend their time, and how specific behaviors impact thoughts and feelings.  The group explored the effect of negative and positive activities on mood states and thought patterns.  Patients identified activities that help to improve mood and thinking patterns, and developed a plan to implement positive activities between sessions.      Patient Session Goals / Objectives:    Identify impact of current behaviors on thoughts and mood    Identify 2-3 behavioral changes that could have a positive impact on thoughts and mood    Prepare to make desired behavioral change: Create a change plan / activity schedule    Telemedicine Visit: The patient's condition can be safely assessed and treated via synchronous audio and visual telemedicine encounter.      Reason for Telemedicine Visit: Services only offered telehealth and due to COVID-19    Originating Site (Patient Location): Patient's home    Distant Site (Provider Location): Provider Remote Setting    Consent:  The patient/guardian has verbally consented to: the potential risks and benefits of telemedicine (video visit) versus in person care; bill my insurance or make self-payment for services provided; and responsibility for payment of non-covered services.     Mode of Communication:  Video Conference via [employer]    As the provider I attest to compliance with applicable laws and regulations related to telemedicine.        Patient Participation / Response:  Fully participated with the group by  sharing personal reflections / insights and openly received / provided feedback with other participants.    Demonstrated understanding of topics discussed through group discussion and participation, Expressed understanding of the relationship between behaviors, thoughts, and feelings and Shared experiences and challenges with making behavioral changes    Treatment Plan:  Patient has a current master individualized treatment plan.  See Epic treatment plan for more information.    Morenita Lau, LPCC

## 2021-01-27 NOTE — GROUP NOTE
Psychotherapy Group Note    PATIENT'S NAME: Joel Pineda  MRN:   [id number]  :   1975  ACCT. NUMBER: [id number]  DATE OF SERVICE: 21  START TIME:  9:00 AM  END TIME:  9:50 AM  FACILITATOR: Morenita Lau LPCC  TOPIC: MH EBP Group: Behavioral Activation  [facility] Adult Mental Health Day Treatment  TRACK: 6A    NUMBER OF PARTICIPANTS: 7    Summary of Group / Topics Discussed:  Behavioral Activation: Motivation and Procrastination: Patients explored how they currently spend their time, identifying thoughts and feelings that are motivating and serve to increase desired behaviors.  They also examined behaviors that contribute to procrastination.  Different types of procrastination behaviors were identified, and strategies to reduce individual procrastination and increase motivation were explored and practiced.  Patients identified ways to increase goal-directed activities to enhance mood and reduce symptoms.        Patient Session Goals / Objectives:    Identify current patterns of procrastination behavior and how they influence thoughts and moods, and inhibit motivation.    Identify behaviors that can be implemented that contribute to improving thoughts and feelings, motivation, and reduce symptoms.    Identify and develop a plan to increase activities that promote a sense of accomplishment and competence.    Practice scheduling positive activities / behaviors into daily routines.    Telemedicine Visit: The patient's condition can be safely assessed and treated via synchronous audio and visual telemedicine encounter.      Reason for Telemedicine Visit: Services only offered telehealth and due to COVID-19    Originating Site (Patient Location): Patient's home    Distant Site (Provider Location): Provider Remote Setting    Consent:  The patient/guardian has verbally consented to: the potential risks and benefits of telemedicine (video visit) versus in person care; bill my insurance or make  self-payment for services provided; and responsibility for payment of non-covered services.     Mode of Communication:  Video Conference via [employer]    As the provider I attest to compliance with applicable laws and regulations related to telemedicine.        Patient Participation / Response:  Fully participated with the group by sharing personal reflections / insights and openly received / provided feedback with other participants.    Demonstrated understanding of topics discussed through group discussion and participation, Expressed understanding of the relationship between behaviors, thoughts, and feelings and Shared experiences and challenges with making behavioral changes    Treatment Plan:  Patient has a current master individualized treatment plan.  See Epic treatment plan for more information.    Morenita Lau, [facility]

## 2021-01-27 NOTE — PROGRESS NOTES
"Date: 2021 18:35:56  Clinician: Holly Saucedo  Clinician NPI: [id number]  Patient: Joel Pineda  Patient : 1975  Patient Address: [facility] [place] [address]  Patient Phone: [phone]  Visit Protocol: Tinea  Patient Summary:  Joel is a 45 year old ( : 1975 ) male who initiated a [facility] Visit for evaluation of Jock itch. When asked the question \"Please sign me up to receive news, health information and promotions from [facility].\", Joel responded \"No\".     Images of his skin condition were not required due to location of the rash.   His symptoms started 1-2 weeks ago. The rash is red in color. It feels burning, painful, itchy, and tender to touch.   Symptom details     Itching: Joel has moderate itching.     Pain: The pain is moderate (4-6 on a 10 point pain scale).      Denied symptoms include dry, flaky skin, crusts, pimples, and blisters. Joel also denied raised, scaly patches on elbows and the front or back of the knees. Joel does not feel feverish. He does not have a rash in the shape of a bull's-eye. There are no red streaks extending from the rash.    Precipitating events  Just before the symptoms started, Joel did not come in contact with plants such as poison ivy or poison oak. He also did not come in contact with new soaps, lotions, or detergents.   Pertinent medical history  Joel has diabetes. He has been told by his provider that his diabetes is in control.   He reports having the following immunosuppressive condition(s) : long-term use of steroids or other immunosuppressive medication.   Treatments or home remedies used to relieve the symptoms as reported by the patient (free text): OTC talc and non-talc powders (not helpful, cause further irritation), OTC Lotrimin (tried BID for several days: ineffective), OTC Hydrocortisone (helps itching short-term).   Joel does not have liver disease.   Joel does not need a return to " work/school note.    Joel does not smoke or use smokeless tobacco.   Additional information as reported by the patient (free text): I would like to take an oral anti-fungal. Thx.          Klonopin oral    glipizide oral    nabumetone oral    ramipril oral    Lovaza oral    Tylenol Extra Strength oral    Cymbalta oral    melatonin oral    Maxalt-MLT oral    oxycodone oral    Aspirin Low Dose oral    Zyrtec oral    famotidine oral    Ventolin HFA inhalation    Seroquel oral    cyanocobalamin (vit B-12) injection    Enbrel SureClick subcutaneous    metoprolol succinate oral    Singulair oral    Lyrica oral    omeprazole oral    Advair Diskus inhalation     Weight: 330 lbs    MEDICATIONS: Klonopin oral, glipizide oral, nabumetone oral, ramipril oral, Lovaza oral, Tylenol Extra Strength oral, Cymbalta oral, melatonin oral, Maxalt-MLT oral, oxycodone oral, Aspirin Low Dose oral, Zyrtec oral, famotidine oral, Ventolin HFA inhalation, Seroquel oral, cyanocobalamin (vit B-12) injection, Enbrel SureClick subcutaneous, metoprolol succinate oral, Singulair oral, Lyrica oral, omeprazole oral, Advair Diskus inhalation, ALLERGIES: Augmentin, ketoconazole, Flagyl, Penicillins  Clinician Response:  Dear Joel,   Your health is our priority. To determine the most appropriate care for you, I would like you to be seen in person to further discuss your health history and symptoms.  You will not be charged for this [facility] Visit. Thank you for trusting us with your care.   Diagnosis: Refer for additional evaluation  Diagnosis ICD: R69  Additional Clinician Notes:  Some of the medications that you are taking may have adverse reactions when combined with an oral antifungal.&nbsp; &nbsp;Oral antifungals can also affect your liver.&nbsp; Please be seen in an urgent care for further evaluation and possible lab evaluation.&nbsp; Thank you.

## 2021-01-27 NOTE — GROUP NOTE
"Process Group Note    PATIENT'S NAME: Joel Pineda  MRN:   [id number]  :   1975  ACCT. NUMBER: [id number]  DATE OF SERVICE: 21  START TIME: 10:00 AM  END TIME: 10:50 AM  FACILITATOR: Lenore French [facility]  TOPIC:  Process Group    Diagnoses:  296.33 (F33.2) Major Depressive Disorder, Recurrent Episode, Severe _.  4. Other Diagnoses that is relevant to services:   300.00 (F41.9) Unspecified Anxiety Disorder  301.83 (F60.3) Borderline Personality Disorder.      [facility] Mental Health Day Treatment  TRACK: 6A    NUMBER OF PARTICIPANTS: 7    Telemedicine Visit: The patient's condition can be safely assessed and treated via synchronous audio and visual telemedicine encounter.      Reason for Telemedicine Visit: Services only offered telehealth    Originating Site (Patient Location): Patient's home    Distant Site (Provider Location): Provider Remote Setting    Consent:  The patient/guardian has verbally consented to: the potential risks and benefits of telemedicine (video visit) versus in person care; bill my insurance or make self-payment for services provided; and responsibility for payment of non-covered services.     Mode of Communication:  Video Conference via [employer]    As the provider I attest to compliance with applicable laws and regulations related to telemedicine.            Data:    Session content: At the start of this group, patients were invited to check in by identifying themselves, describing their current emotional status, and identifying issues to address in this group.   Area(s) of treatment focus addressed in this session included Symptom Management and Personal Safety.    Tito reported feeling \"annoyed\" and \"frustrated\" today because he got bad news from his MRI yesterday.  His goal for the day is to relax and calm himself down by practicing some mindfulness.  Client declined additional process time but contributed to group discussion and provided feedback and " support to peers.      Therapeutic Interventions/Treatment Strategies:  Psychotherapist offered support, feedback and validation.    Assessment:    Patient response:   Patient responded to session by accepting feedback, giving feedback and listening    Possible barriers to participation / learning include: and no barriers identified    Health Issues:   Yes: Pain, Associated Psychological Distress       Substance Use Review:   Substance Use: No active concerns identified.    Mental Status/Behavioral Observations  Appearance:   Appropriate   Eye Contact:   Good   Psychomotor Behavior: Normal   Attitude:   Cooperative   Orientation:   All  Speech   Rate / Production: Normal    Volume:  Normal   Mood:    Anxious  Depressed  Irritable   Affect:    Appropriate   Thought Content:   Psychosis reports hallucinations visual  Thought Form:  Coherent  Logical     Insight:    Good     Plan:     Safety Plan: No current safety concerns identified.  Recommended that patient call 911 or go to the local ED should there be a change in any of these risk factors.     Barriers to treatment: None identified    Patient Contracts (see media tab):  None    Substance Use: Not addressed in session     Continue or Discharge: Patient will continue in Adult Day Treatment (ADT)  as planned. Patient is likely to benefit from learning and using skills as they work toward the goals identified in their treatment plan.      Lenore French, [facility]  January 27, 2021

## 2021-01-28 ENCOUNTER — TELEPHONE (OUTPATIENT)
Dept: BEHAVIORAL HEALTH | Facility: CLINIC | Age: 46
End: 2021-01-28

## 2021-01-28 NOTE — TELEPHONE ENCOUNTER
"Writer and patient discussed transferring to the partial hospitalization program; patient expressed concerns that he has appointments on his \"off-days\" from intensive outpatient programming and doesn't want to reschedule them. Patient asked about topics discussed in the PHP and writer explained that PHP includes nursing and occupational therapy sessions and patient expressed that he would appreciate those topics and will use that information to make his decision. Patient expressed he would like to consult with his provider following his MRI results next Monday and would potentially want to wait until after surgery to begin in PHP. Writer and patient agreed to check-in regarding his decision further next week.     Morenita Lau [facility] on 1/28/2021 at 11:09 AM    "

## 2021-01-29 ENCOUNTER — HOSPITAL ENCOUNTER (OUTPATIENT)
Dept: BEHAVIORAL HEALTH | Facility: CLINIC | Age: 46
End: 2021-01-29
Attending: PSYCHIATRY & NEUROLOGY
Payer: MEDICARE

## 2021-01-29 PROCEDURE — 90853 GROUP PSYCHOTHERAPY: CPT | Mod: PO | Performed by: COUNSELOR

## 2021-01-29 NOTE — GROUP NOTE
"Process Group Note    PATIENT'S NAME: Joel Pineda  MRN:   [id number]  :   1975  ACCT. NUMBER: [id number]  DATE OF SERVICE: 21  START TIME:  9:00 AM  END TIME:  9:50 AM  FACILITATOR: Lenore French [facility]  TOPIC:  Process Group    Diagnoses:  296.33 (F33.2) Major Depressive Disorder, Recurrent Episode, Severe _.  4. Other Diagnoses that is relevant to services:   300.00 (F41.9) Unspecified Anxiety Disorder  301.83 (F60.3) Borderline Personality Disorder.      [facility] Mental Health Day Treatment  TRACK: 6A    NUMBER OF PARTICIPANTS: 9    Telemedicine Visit: The patient's condition can be safely assessed and treated via synchronous audio and visual telemedicine encounter.      Reason for Telemedicine Visit: Services only offered telehealth    Originating Site (Patient Location): Patient's home    Distant Site (Provider Location): Provider Remote Setting    Consent:  The patient/guardian has verbally consented to: the potential risks and benefits of telemedicine (video visit) versus in person care; bill my insurance or make self-payment for services provided; and responsibility for payment of non-covered services.     Mode of Communication:  Video Conference via [employer]    As the provider I attest to compliance with applicable laws and regulations related to telemedicine.            Data:    Session content: At the start of this group, patients were invited to check in by identifying themselves, describing their current emotional status, and identifying issues to address in this group.   Area(s) of treatment focus addressed in this session included Symptom Management, Personal Safety and Abstinence/Relapse Prevention.    Tito reported feeling \"rushed\" today because he woke up late.  He reported he had a [employer] meeting yesterday and feels frustrated because he feels like several members are \"gaslighting\" him.  His goal is to have a productive meeting with his new therapist.  He was " encouraged to review his grounding skills as he will need them when getting into trauma work.       Therapeutic Interventions/Treatment Strategies:  Psychotherapist offered support, feedback and validation.    Assessment:    Patient response:   Patient responded to session by accepting feedback, giving feedback and listening    Possible barriers to participation / learning include: and no barriers identified    Health Issues:   Yes: Pain, Associated Psychological Distress       Substance Use Review:   Substance Use: No active concerns identified.    Mental Status/Behavioral Observations  Appearance:   Appropriate   Eye Contact:   Good   Psychomotor Behavior: Normal   Attitude:   Cooperative   Orientation:   All  Speech   Rate / Production: Normal    Volume:  Normal   Mood:    Anxious  Depressed   Affect:    Appropriate   Thought Content:   Clear  Thought Form:  Coherent  Logical     Insight:    Good     Plan:     Safety Plan: No current safety concerns identified.  Recommended that patient call 911 or go to the local ED should there be a change in any of these risk factors.     Barriers to treatment: None identified    Patient Contracts (see media tab):  None    Substance Use: Not addressed in session     Continue or Discharge: Patient will continue in Adult Day Treatment (ADT)  as planned. Patient is likely to benefit from learning and using skills as they work toward the goals identified in their treatment plan.      Lenore French, [facility]  January 29, 2021

## 2021-01-29 NOTE — GROUP NOTE
Psychotherapy Group Note    PATIENT'S NAME: Joel Pineda  MRN:   [id number]  :   1975  ACCT. NUMBER: [id number]  DATE OF SERVICE: 21  START TIME: 11:00 AM  END TIME: 11:50 AM  FACILITATOR: Morenita Lau LPCC  TOPIC: MH EBP Group: Coping Skills  [facility] Adult Mental Health Day Treatment  TRACK: 6A    NUMBER OF PARTICIPANTS: 7    Summary of Group / Topics Discussed:  Coping Skills: Relapse Planning: Patients discussed and increased understanding of how anticipating and planning for possible increased symptoms is a proactive way to reduce the likelihood of relapse.  Patients shared individualized factors that may lead to increased symptoms and decompensation in functioning.  Each patient developed a relapse prevention plan designed to help them recognize when they may have increasing symptoms requiring them to take action and notify their key supports and care team.      Patient Session Goals / Objectives:    Identify and understand what factors may contribute to symptom relapse.    Identify actions that may be taken to manage increased symptoms/stressors.    Create an individualized written relapse plan    Problem solve barriers to plan creation and implementation    Share relapse plan with key support people    Telemedicine Visit: The patient's condition can be safely assessed and treated via synchronous audio and visual telemedicine encounter.      Reason for Telemedicine Visit: Services only offered telehealth and due to COVID-19    Originating Site (Patient Location): Patient's home    Distant Site (Provider Location): Provider Remote Setting    Consent:  The patient/guardian has verbally consented to: the potential risks and benefits of telemedicine (video visit) versus in person care; bill my insurance or make self-payment for services provided; and responsibility for payment of non-covered services.     Mode of Communication:  Video Conference via [employer]    As the provider I  attest to compliance with applicable laws and regulations related to telemedicine.        Patient Participation / Response:  Fully participated with the group by sharing personal reflections / insights and openly received / provided feedback with other participants.    Demonstrated understanding of topics discussed through group discussion and participation, Expressed understanding of the relevance / importance of coping skills at distressing times in life and Demonstrated knowledge of when to consider using a variety of coping skills in daily life    Treatment Plan:  Patient has a current master individualized treatment plan.  See Epic treatment plan for more information.    Morenita Lau, [facility]

## 2021-01-29 NOTE — GROUP NOTE
Psychotherapy Group Note    PATIENT'S NAME: Joel Pineda  MRN:   [id number]  :   1975  ACCT. NUMBER: [id number]  DATE OF SERVICE: 21  START TIME: 10:00 AM  END TIME: 10:50 AM  FACILITATOR: Morenita aLu LPCC  TOPIC: MH EBP Group: Behavioral Activation  [facility] Adult Mental Health Day Treatment  TRACK: 6A    NUMBER OF PARTICIPANTS: 7    Summary of Group / Topics Discussed:  Behavioral Activation: [place]: {Patients identified situations that prompt unwanted and unhelpful emotions / thoughts / behaviors.   Patients discussed how to problem solve by proactively using coping skills in potentially difficult situations. Components included describing the situation, brainstorming coping skills, imagining how scenario can/will unfold, rehearsing the action plan, and practicing relaxation to follow.  Patients practiced using these skills to reduce symptom distress and increase effective coping  behaviors.      Patient Session Goals / Objectives:    Identify difficult situation(s), and gain proficiency with alternative behaviors / skills to problem solve.    Increase confidence using coping skills through group practice in session.    Receive and provide feedback regarding skill development.    Apply coping skills in daily life situations.    Telemedicine Visit: The patient's condition can be safely assessed and treated via synchronous audio and visual telemedicine encounter.      Reason for Telemedicine Visit: Services only offered telehealth and due to COVID-19    Originating Site (Patient Location): Patient's home    Distant Site (Provider Location): Provider Remote Setting    Consent:  The patient/guardian has verbally consented to: the potential risks and benefits of telemedicine (video visit) versus in person care; bill my insurance or make self-payment for services provided; and responsibility for payment of non-covered services.     Mode of Communication:  Video Conference via  Clif    As the provider I attest to compliance with applicable laws and regulations related to telemedicine.        Patient Participation / Response:  Fully participated with the group by sharing personal reflections / insights and openly received / provided feedback with other participants.    Demonstrated understanding of topics discussed through group discussion and participation, Expressed understanding of the relationship between behaviors, thoughts, and feelings and Shared experiences and challenges with making behavioral changes    Treatment Plan:  Patient has a current master individualized treatment plan.  See Epic treatment plan for more information.    Morenita Lau, [facility]

## 2021-02-01 ENCOUNTER — VIRTUAL VISIT (OUTPATIENT)
Dept: ORTHOPEDICS | Facility: CLINIC | Age: 46
End: 2021-02-01
Payer: MEDICARE

## 2021-02-01 ENCOUNTER — MYC MEDICAL ADVICE (OUTPATIENT)
Dept: PALLIATIVE MEDICINE | Facility: CLINIC | Age: 46
End: 2021-02-01

## 2021-02-01 DIAGNOSIS — M54.2 PAIN OF CERVICAL SPINE: Primary | ICD-10-CM

## 2021-02-01 DIAGNOSIS — M50.30 DEGENERATION OF CERVICAL INTERVERTEBRAL DISC: ICD-10-CM

## 2021-02-01 DIAGNOSIS — M50.20 CERVICAL DISC HERNIATION: ICD-10-CM

## 2021-02-01 DIAGNOSIS — R51.9 LEFT-SIDED FACE PAIN: ICD-10-CM

## 2021-02-01 PROCEDURE — 99441 PR PHYSICIAN TELEPHONE EVALUATION 5-10 MIN: CPT | Performed by: PEDIATRICS

## 2021-02-01 NOTE — LETTER
2/1/2021         RE: Joel Pineda  [address]  [place] [address]        Dear Colleague,    Thank you for referring your patient, Joel Pineda, to the [facility]. Please see a copy of my visit note below.    Tito is a 45 year old who is being evaluated via a billable telephone visit.      What phone number would you like to be contacted at? [phone]  How would you like to obtain your AVS? MyChart  Phone call duration: 10 minutes    Sports Medicine Clinic Visit - Interim History February 1, 2021    PCP: Ksenia Lyles    Joel Pineda is a 45 year old male who is seen in f/u up for    Pain of cervical spine  Degeneration of cervical intervertebral disc  Left-sided face pain  Cervical disc herniation.  Since last visit on 1/25/21 patient has completed an MRI of the cervical spine. He reports no significant change in his symptoms. He reports continued facial tightness. He reports no episodes of head dropping since his appointment. Has had finger twitching in the past. The position of the MRI caused numbness into his arm, otherwise, no radiating symptoms into the arm.    Symptoms are better with: Ice and Other medications: Baclofen, oxycodone  Symptoms are worse with: worse in evening  Additional Features:   Positive: weakness   Negative: swelling, bruising, popping, grinding, catching, locking, instability, paresthesias and numbness    Social History: disabled     Review of Systems  Skin: no bruising, no swelling  Musculoskeletal: as above  Neurologic: no numbness, paresthesias  Remainder of review of systems is negative including constitutional, CV, pulmonary, GI, except as noted in HPI or medical history.    Patient's current problem list, past medical and surgical history, and family history were reviewed.    Patient Active Problem List   Diagnosis     Major depressive disorder, recurrent episode (H)     Intermittent asthma     Hyperlipidemia  LDL goal <100     Chronic nonallergic rhinitis     Diverticulosis     GERD (gastroesophageal reflux disease)     Anxiety     LLOYD (obstructive sleep apnea)- mild (AHI 11)     Intracranial arachnoid cyst     Facet arthritis of cervical region     Acquired hypothyroidism     Bipolar 2 disorder (H)     Chronic midline low back pain without sciatica     Irritable bowel syndrome with diarrhea     B12 deficiency     Essential hypertension with goal blood pressure less than 140/90     Chronic pain syndrome     Ankylosing spondylitis of sacral region (H)     Morbid obesity due to hypertriglyceridemia (H)     Fatty infiltration of liver     DDD (degenerative disc disease), lumbar     Type 2 diabetes mellitus with complication, without long-term current use of insulin (H)     Peripheral polyneuropathy     History of pulmonary embolism     Ingrown toenail     Hypertriglyceridemia     Gastroparesis     Orthostatic dizziness     POTS (postural orthostatic tachycardia syndrome)     PHN (postherpetic neuralgia)     Dysautonomia (H)     Major depressive disorder, recurrent episode, severe (H)     Right foot pain     Past Medical History:   Diagnosis Date     Acne      Acquired hypothyroidism      Allergic state      Ankylosing spondylitis lumbar region (H)      Ankylosing spondylitis of sacral region (H)      Anxiety      Bipolar 2 disorder (H)      Chest pain     Chest pain, regulated w/BP meds. Clear arteries.     Chronic pain      DDD (degenerative disc disease), lumbar      Depressive disorder      Diabetes (H)      Diverticulosis      Facet arthritis of cervical region      Gastroesophageal reflux disease      Hypertension      IBS (irritable bowel syndrome)      Intracranial arachnoid cyst      Polyneuropathy      Pulmonary embolism (H)      Skin exam, screening for cancer 12/3/2013     Sleep apnea      Uncomplicated asthma      Past Surgical History:   Procedure Laterality Date     BACK SURGERY  10/07    lumbar discectomy  "L5-S1     COLONOSCOPY      Note: colonoscopy scheduled with [facility] on Friday, 9/4/15     COSMETIC SURGERY  2012    Nose Exterior - functional     GI SURGERY  August 2013    Sigmoidectomy     HERNIA REPAIR, UMBILICAL  8/23/11    small, Dr. Evan Beavers     INCISION AND DRAINAGE, ABSCESS, COMPLEX  8/23/11    umbilical, Dr. Evan Beavers     LAPAROSCOPIC ASSISTED COLECTOMY LEFT (DESCENDING)  8/15/2013    Procedure: LAPAROSCOPIC ASSISTED COLECTOMY LEFT (DESCENDING);  Laparoscopic Hand Assisted Sigmoid Resection, Mobilization of Splenic Fissure, coloproctoscopy, *Latex Free Room* Anesthesia General with Pain block  ;  Surgeon: Aurora Justice MD;  Location: [facility] OR     NERVE SURGERY  8/18/11    RF ablation @ L3-S1 @ [facility]     RECONSTRUCT NOSE AND SEPTUM (FUNCTIONAL)  10/14/2011    Procedure:RECONSTRUCT NOSE AND SEPTUM (FUNCTIONAL); Functional Septorhinoplasty, Turbinate Reduction, ; Surgeon:CEDRIC CUEVAS; Location:[facility] OR     SINUS SURGERY  10/1/01    ethmoidectomy chronic sinusitis     Family History   Problem Relation Age of Onset     Musculoskeletal Disorder Mother         back     Anxiety Disorder Mother      Colon Polyps Mother      Ulcerative Colitis Mother         and ischemic small intestine, surgery     Hypertension Mother      Breast Cancer Mother      Osteoporosis Mother      Diabetes Mother         Type 2, Diagnosed in 2014     Depression Mother         Takes Cymbalta to help with chronic pain + depx     Thyroid Disease Mother         Hypothyroidism     Obesity Mother         Under much better control latter half of 2015     Musculoskeletal Disorder Father         back     Substance Abuse Father      Hypertension Father      Hyperlipidemia Father      Depression Father         Off meds for many years. Seems \"ok\"     Heart Disease Maternal Grandmother      Heart Disease Maternal Grandfather      Psychotic Disorder Paternal Grandfather      Suicide Paternal Grandfather      Depression Paternal Grandfather         " Pediatrician. Committed suicide by pistol in 1990.     Musculoskeletal Disorder Brother         back     Depression Brother         Expressed as anger and moodiness     Substance Abuse Brother      Substance Abuse Sister      Depression Sister         Mental Health Therapist, yet no anti-depressants?     Anxiety Disorder Sister         Mental Health Therapist, yet no anti-anxiety meds?     Other Cancer Other         Bladder Cancer - Fatal     Substance Abuse Brother      Colon Cancer No family hx of      Crohn's Disease No family hx of      Anesthesia Reaction No family hx of      Cancer No family hx of         No family history of skin cancer       Objective    Radiology  I ordered, visualized and reviewed these images with the patient  MRI OF THE CERVICAL SPINE WITHOUT CONTRAST   1/26/2021 3:31 PM      HISTORY: Cervical spine pain.     TECHNIQUE: Multiplanar, multisequence MRI images of the cervical spine  were acquired without contrast.     COMPARISON: Cervical spine MRI 10/1/2015.     FINDINGS:   Normal vertebral body heights, alignment and marrow signal. Normal  cord signal. Visualized extraspinal soft tissues are within normal  limits.      C2-3: Normal disc height.  No herniation.  Normal facets.  No spinal  canal or neural foraminal stenosis.     C3-4: Normal disc height.  No herniation.  Normal facets.  No spinal  canal or neural foraminal stenosis.     C4-5: Normal disc height. Moderate left central and foraminal disc  osteophyte complex. Normal facets. No spinal canal or right neural  foraminal stenosis. Severe left neural foraminal stenosis.     C5-6: Normal disc height. Mild circumferential disc osteophyte  complex. Mild bilateral facet arthropathy. Mild effacement of the  ventral spinal canal. No neural foraminal stenosis.     C6-7: Normal disc height. Moderate left central and foraminal disc  herniation. Mild bilateral facet arthropathy. No spinal canal or right  neural foraminal stenosis. Severe  stenosis of the left foraminal inlet  and left neural foramen.     C7-T1: Normal disc height.  No herniation.  Normal facets.  No spinal  canal or neural foraminal stenosis.                                                                      IMPRESSION:  1.  Interval development of a moderately large left central C6-7 disc  herniation resulting in severe stenosis of the left foraminal inlet  and left neural foramen.  2.  Slightly progressed moderate left central and foraminal C4-5 disc  osteophyte complex with severe left neural foraminal stenosis.     IVAN CARLISLE MD         Assessment:  1. Pain of cervical spine    2. Degeneration of cervical intervertebral disc    3. Left-sided face pain    4. Cervical disc herniation      Does have new disc herniation, could be contributing to neck pain. Given new finding, reasonable to consult with spine, however, disc at C6-C7 unlikely to cause symptoms into face.  Can continue PT for neck as well.  Follow up with primary care to discuss facial symptoms.    Plan:  - Today's Plan of Care:  Referral to a Spine Surgeon - Dr. Obando  Referral to Physical Therapy - Orthology  Follow up with Primary Care and Pain Clinic regarding facial pain    Follow Up: as needed with me    Concerning signs and symptoms were reviewed.  The patient expressed understanding of this management plan and all questions were answered at this time.    Farida Hodgson MD CAQ  Primary Care Sports Medicine  [facility]      Again, thank you for allowing me to participate in the care of your patient.        Sincerely,        Farida Hodgson MD

## 2021-02-01 NOTE — PROGRESS NOTES
Tito is a 45 year old who is being evaluated via a billable telephone visit.      What phone number would you like to be contacted at? [phone]  How would you like to obtain your AVS? Kal  Phone call duration: 10 minutes    Sports Medicine Clinic Visit - Interim History February 1, 2021    PCP: Ksenia Lyles    Joel Pineda is a 45 year old male who is seen in f/u up for    Pain of cervical spine  Degeneration of cervical intervertebral disc  Left-sided face pain  Cervical disc herniation.  Since last visit on 1/25/21 patient has completed an MRI of the cervical spine. He reports no significant change in his symptoms. He reports continued facial tightness. He reports no episodes of head dropping since his appointment. Has had finger twitching in the past. The position of the MRI caused numbness into his arm, otherwise, no radiating symptoms into the arm.    Symptoms are better with: Ice and Other medications: Baclofen, oxycodone  Symptoms are worse with: worse in evening  Additional Features:   Positive: weakness   Negative: swelling, bruising, popping, grinding, catching, locking, instability, paresthesias and numbness    Social History: disabled     Review of Systems  Skin: no bruising, no swelling  Musculoskeletal: as above  Neurologic: no numbness, paresthesias  Remainder of review of systems is negative including constitutional, CV, pulmonary, GI, except as noted in HPI or medical history.    Patient's current problem list, past medical and surgical history, and family history were reviewed.    Patient Active Problem List   Diagnosis     Major depressive disorder, recurrent episode (H)     Intermittent asthma     Hyperlipidemia LDL goal <100     Chronic nonallergic rhinitis     Diverticulosis     GERD (gastroesophageal reflux disease)     Anxiety     LLOYD (obstructive sleep apnea)- mild (AHI 11)     Intracranial arachnoid cyst     Facet arthritis of cervical region     Acquired  hypothyroidism     Bipolar 2 disorder (H)     Chronic midline low back pain without sciatica     Irritable bowel syndrome with diarrhea     B12 deficiency     Essential hypertension with goal blood pressure less than 140/90     Chronic pain syndrome     Ankylosing spondylitis of sacral region (H)     Morbid obesity due to hypertriglyceridemia (H)     Fatty infiltration of liver     DDD (degenerative disc disease), lumbar     Type 2 diabetes mellitus with complication, without long-term current use of insulin (H)     Peripheral polyneuropathy     History of pulmonary embolism     Ingrown toenail     Hypertriglyceridemia     Gastroparesis     Orthostatic dizziness     POTS (postural orthostatic tachycardia syndrome)     PHN (postherpetic neuralgia)     Dysautonomia (H)     Major depressive disorder, recurrent episode, severe (H)     Right foot pain     Past Medical History:   Diagnosis Date     Acne      Acquired hypothyroidism      Allergic state      Ankylosing spondylitis lumbar region (H)      Ankylosing spondylitis of sacral region (H)      Anxiety      Bipolar 2 disorder (H)      Chest pain     Chest pain, regulated w/BP meds. Clear arteries.     Chronic pain      DDD (degenerative disc disease), lumbar      Depressive disorder      Diabetes (H)      Diverticulosis      Facet arthritis of cervical region      Gastroesophageal reflux disease      Hypertension      IBS (irritable bowel syndrome)      Intracranial arachnoid cyst      Polyneuropathy      Pulmonary embolism (H)      Skin exam, screening for cancer 12/3/2013     Sleep apnea      Uncomplicated asthma      Past Surgical History:   Procedure Laterality Date     BACK SURGERY  10/07    lumbar discectomy L5-S1     COLONOSCOPY      Note: colonoscopy scheduled with [facility] on Friday, 9/4/15     COSMETIC SURGERY  2012    Nose Exterior - functional     GI SURGERY  August 2013    Sigmoidectomy     HERNIA REPAIR, UMBILICAL  8/23/11    Dr. Evan whiting      "INCISION AND DRAINAGE, ABSCESS, COMPLEX  8/23/11    umbilical, Dr. Evan Beavers     LAPAROSCOPIC ASSISTED COLECTOMY LEFT (DESCENDING)  8/15/2013    Procedure: LAPAROSCOPIC ASSISTED COLECTOMY LEFT (DESCENDING);  Laparoscopic Hand Assisted Sigmoid Resection, Mobilization of Splenic Fissure, coloproctoscopy, *Latex Free Room* Anesthesia General with Pain block  ;  Surgeon: Aurora Justice MD;  Location: [facility] OR     NERVE SURGERY  8/18/11    RF ablation @ L3-S1 @ [facility]     RECONSTRUCT NOSE AND SEPTUM (FUNCTIONAL)  10/14/2011    Procedure:RECONSTRUCT NOSE AND SEPTUM (FUNCTIONAL); Functional Septorhinoplasty, Turbinate Reduction, ; Surgeon:CEDRIC CUEVAS; Location:[facility] OR     SINUS SURGERY  10/1/01    ethmoidectomy chronic sinusitis     Family History   Problem Relation Age of Onset     Musculoskeletal Disorder Mother         back     Anxiety Disorder Mother      Colon Polyps Mother      Ulcerative Colitis Mother         and ischemic small intestine, surgery     Hypertension Mother      Breast Cancer Mother      Osteoporosis Mother      Diabetes Mother         Type 2, Diagnosed in 2014     Depression Mother         Takes Cymbalta to help with chronic pain + depx     Thyroid Disease Mother         Hypothyroidism     Obesity Mother         Under much better control latter half of 2015     Musculoskeletal Disorder Father         back     Substance Abuse Father      Hypertension Father      Hyperlipidemia Father      Depression Father         Off meds for many years. Seems \"ok\"     Heart Disease Maternal Grandmother      Heart Disease Maternal Grandfather      Psychotic Disorder Paternal Grandfather      Suicide Paternal Grandfather      Depression Paternal Grandfather         Pediatrician. Committed suicide by pistol in 1990.     Musculoskeletal Disorder Brother         back     Depression Brother         Expressed as anger and moodiness     Substance Abuse Brother      Substance Abuse Sister      Depression Sister         " Mental Health Therapist, yet no anti-depressants?     Anxiety Disorder Sister         Mental Health Therapist, yet no anti-anxiety meds?     Other Cancer Other         Bladder Cancer - Fatal     Substance Abuse Brother      Colon Cancer No family hx of      Crohn's Disease No family hx of      Anesthesia Reaction No family hx of      Cancer No family hx of         No family history of skin cancer       Objective    Radiology  I ordered, visualized and reviewed these images with the patient  MRI OF THE CERVICAL SPINE WITHOUT CONTRAST   1/26/2021 3:31 PM      HISTORY: Cervical spine pain.     TECHNIQUE: Multiplanar, multisequence MRI images of the cervical spine  were acquired without contrast.     COMPARISON: Cervical spine MRI 10/1/2015.     FINDINGS:   Normal vertebral body heights, alignment and marrow signal. Normal  cord signal. Visualized extraspinal soft tissues are within normal  limits.      C2-3: Normal disc height.  No herniation.  Normal facets.  No spinal  canal or neural foraminal stenosis.     C3-4: Normal disc height.  No herniation.  Normal facets.  No spinal  canal or neural foraminal stenosis.     C4-5: Normal disc height. Moderate left central and foraminal disc  osteophyte complex. Normal facets. No spinal canal or right neural  foraminal stenosis. Severe left neural foraminal stenosis.     C5-6: Normal disc height. Mild circumferential disc osteophyte  complex. Mild bilateral facet arthropathy. Mild effacement of the  ventral spinal canal. No neural foraminal stenosis.     C6-7: Normal disc height. Moderate left central and foraminal disc  herniation. Mild bilateral facet arthropathy. No spinal canal or right  neural foraminal stenosis. Severe stenosis of the left foraminal inlet  and left neural foramen.     C7-T1: Normal disc height.  No herniation.  Normal facets.  No spinal  canal or neural foraminal stenosis.                                                                       IMPRESSION:  1.  Interval development of a moderately large left central C6-7 disc  herniation resulting in severe stenosis of the left foraminal inlet  and left neural foramen.  2.  Slightly progressed moderate left central and foraminal C4-5 disc  osteophyte complex with severe left neural foraminal stenosis.     IVAN CARLISLE MD         Assessment:  1. Pain of cervical spine    2. Degeneration of cervical intervertebral disc    3. Left-sided face pain    4. Cervical disc herniation      Does have new disc herniation, could be contributing to neck pain. Given new finding, reasonable to consult with spine, however, disc at C6-C7 unlikely to cause symptoms into face.  Can continue PT for neck as well.  Follow up with primary care to discuss facial symptoms.    Plan:  - Today's Plan of Care:  Referral to a Spine Surgeon - Dr. Obando  Referral to Physical Therapy - Orthology  Follow up with Primary Care and Pain Clinic regarding facial pain    Follow Up: as needed with me    Concerning signs and symptoms were reviewed.  The patient expressed understanding of this management plan and all questions were answered at this time.    Farida Hodgson MD CAQ  Primary Care Sports Medicine  [facility]

## 2021-02-01 NOTE — PATIENT INSTRUCTIONS
Plan:  - Today's Plan of Care:  Referral to a Spine Surgeon - Dr. Obando  Referral to Physical Therapy - Orthology  Follow up with Primary Care and Pain Clinic regarding facial pain    Follow Up: as needed with me    If you have any further questions for your physician or physician s care team you can call [phone] and use option 3 to leave a voice message. Calls received during business hours will be returned same day.

## 2021-02-02 ENCOUNTER — HOSPITAL ENCOUNTER (OUTPATIENT)
Dept: BEHAVIORAL HEALTH | Facility: CLINIC | Age: 46
End: 2021-02-02
Attending: PSYCHIATRY & NEUROLOGY
Payer: MEDICARE

## 2021-02-02 ENCOUNTER — THERAPY VISIT (OUTPATIENT)
Dept: PHYSICAL THERAPY | Facility: CLINIC | Age: 46
End: 2021-02-02
Payer: MEDICARE

## 2021-02-02 ENCOUNTER — DOCUMENTATION ONLY (OUTPATIENT)
Dept: CARE COORDINATION | Facility: CLINIC | Age: 46
End: 2021-02-02

## 2021-02-02 ENCOUNTER — TELEPHONE (OUTPATIENT)
Dept: NEUROSURGERY | Facility: CLINIC | Age: 46
End: 2021-02-02

## 2021-02-02 DIAGNOSIS — M79.671 RIGHT FOOT PAIN: ICD-10-CM

## 2021-02-02 PROCEDURE — 97035 APP MDLTY 1+ULTRASOUND EA 15: CPT | Mod: GP | Performed by: PHYSICAL THERAPIST

## 2021-02-02 PROCEDURE — 90853 GROUP PSYCHOTHERAPY: CPT | Mod: GT | Performed by: COUNSELOR

## 2021-02-02 PROCEDURE — 90853 GROUP PSYCHOTHERAPY: CPT | Mod: PO | Performed by: COUNSELOR

## 2021-02-02 PROCEDURE — 97140 MANUAL THERAPY 1/> REGIONS: CPT | Mod: GP | Performed by: PHYSICAL THERAPIST

## 2021-02-02 NOTE — PROGRESS NOTES
Acknowledgement of Current Treatment Plan       I have reviewed my treatment plan with my therapist on 2/2/21.   I agree with the plan as it is written in the electronic health record. (6A)    Name:      Signature:  Joel Pineda Unable to sign due to COVID-19   Dr Raza Leonard MD  Psychiatrist    Lenore French, [facility], [facility]  Psychotherapist    Morenita Lau [facility]  Psychotherapist Morenita Lau [facility] on 2/2/2021 at 10:05 AM

## 2021-02-02 NOTE — GROUP NOTE
Psychotherapy Group Note    PATIENT'S NAME: Joel Pineda  MRN:   [id number]  :   1975  ACCT. NUMBER: [id number]  DATE OF SERVICE: 21  START TIME: 11:00 AM  END TIME: 11:50 AM  FACILITATOR: Morenita Lau LPCC  TOPIC: MH EBP Group: Coping Skills  [facility] Adult Dual Diagnosis Day Treatment  TRACK: 6A    NUMBER OF PARTICIPANTS: 6    Summary of Group / Topics Discussed:  Coping Skills: Meditation: Patients learned about meditation and explored how and when to utilize it to increase focus, reduce mental health symptoms, decrease physical tension, and improve mental well-being.  Approaches to meditation were presented as a means of increasing self-awareness. The benefits of various meditation practices were discussed, as well as barriers to utilization of this coping strategy.     Patient Session Goals / Objectives:    Understand the purpose and efficacy of using meditation modalities to reduce stress / symptoms.    Review / discuss situations in daily life that cause distress, where establishing a meditation routine or meditating as needed may improve functioning.      Verbalize understanding of how and when to apply grounding strategies to reduce distress and increase presence in the moment.    Practice meditation and address barriers to use in daily life.    Telemedicine Visit: The patient's condition can be safely assessed and treated via synchronous audio and visual telemedicine encounter.      Reason for Telemedicine Visit: Services only offered telehealth and due to COVID-19    Originating Site (Patient Location): Patient's home    Distant Site (Provider Location): Provider Remote Setting    Consent:  The patient/guardian has verbally consented to: the potential risks and benefits of telemedicine (video visit) versus in person care; bill my insurance or make self-payment for services provided; and responsibility for payment of non-covered services.     Mode of Communication:  Video  Conference via [employer]    As the provider I attest to compliance with applicable laws and regulations related to telemedicine.          Patient Participation / Response:  Fully participated with the group by sharing personal reflections / insights and openly received / provided feedback with other participants.    Demonstrated understanding of topics discussed through group discussion and participation, Expressed understanding of the relevance / importance of coping skills at distressing times in life and Demonstrated knowledge of when to consider using a variety of coping skills in daily life    Treatment Plan:  Patient has a current master individualized treatment plan.  See Epic treatment plan for more information.    Morenita Lau, [facility]

## 2021-02-02 NOTE — GROUP NOTE
"Process Group Note    PATIENT'S NAME: Joel Pineda  MRN:   [id number]  :   1975  ACCT. NUMBER: [id number]  DATE OF SERVICE: 21  START TIME:  9:00 AM  END TIME:  9:50 AM  FACILITATOR: Lenore French [facility]  TOPIC:  Process Group    Diagnoses:  296.33 (F33.2) Major Depressive Disorder, Recurrent Episode, Severe _.  4. Other Diagnoses that is relevant to services:   300.00 (F41.9) Unspecified Anxiety Disorder  301.83 (F60.3) Borderline Personality Disorder.      [facility] Adult Dual Diagnosis Day Treatment  TRACK: 6A    NUMBER OF PARTICIPANTS: 7    Telemedicine Visit: The patient's condition can be safely assessed and treated via synchronous audio and visual telemedicine encounter.      Reason for Telemedicine Visit: Services only offered telehealth    Originating Site (Patient Location): Patient's home    Distant Site (Provider Location): Provider Remote Setting    Consent:  The patient/guardian has verbally consented to: the potential risks and benefits of telemedicine (video visit) versus in person care; bill my insurance or make self-payment for services provided; and responsibility for payment of non-covered services.     Mode of Communication:  Video Conference via [employer]    As the provider I attest to compliance with applicable laws and regulations related to telemedicine.            Data:    Session content: At the start of this group, patients were invited to check in by identifying themselves, describing their current emotional status, and identifying issues to address in this group.   Area(s) of treatment focus addressed in this session included Symptom Management and Personal Safety.    Tito reported feeling \"intrigued\" today.  His goal is to practice the body scan his therapist assigned to him.  He reported he had a \"rough\" day on Saturday because he was worried about his therapy appointment on Monday but was able to use distraction to get through the day.  Client declined " additional process time but contributed to group discussion and provided feedback and support to peers.      Therapeutic Interventions/Treatment Strategies:  Psychotherapist offered support, feedback and validation.    Assessment:    Patient response:   Patient responded to session by accepting feedback, giving feedback and listening    Possible barriers to participation / learning include: and no barriers identified    Health Issues:   Yes: Pain, Associated Psychological Distress       Substance Use Review:   Substance Use: No active concerns identified.    Mental Status/Behavioral Observations  Appearance:   Appropriate   Eye Contact:   Good   Psychomotor Behavior: Normal   Attitude:   Cooperative   Orientation:   All  Speech   Rate / Production: Normal    Volume:  Normal   Mood:    Depressed   Affect:    Appropriate   Thought Content:   Clear  Thought Form:  Coherent  Logical     Insight:    Good     Plan:     Safety Plan: No current safety concerns identified.  Recommended that patient call 911 or go to the local ED should there be a change in any of these risk factors.     Barriers to treatment: None identified    Patient Contracts (see media tab):  None    Substance Use: Not addressed in session     Continue or Discharge: Patient will continue in Adult Day Treatment (ADT)  as planned. Patient is likely to benefit from learning and using skills as they work toward the goals identified in their treatment plan.      Lenore French, [facility]  February 2, 2021

## 2021-02-02 NOTE — ADDENDUM NOTE
Encounter addended by: Lenore French [facility] on: 2/2/2021 12:19 PM   Actions taken: Clinical Note Signed

## 2021-02-02 NOTE — GROUP NOTE
Psychotherapy Group Note    PATIENT'S NAME: Joel Pineda  MRN:   [id number]  :   1975  [id number]  DATE OF SERVICE: 21  START TIME: 10:00 AM  END TIME: 10:50 AM  FACILITATOR: Lenore French LPCC  TOPIC: MH EBP Group: DDP Relapse Prevention  [facility] Mental Health Day Treatment  TRACK: 6A    NUMBER OF PARTICIPANTS: 6    Telemedicine Visit: The patient's condition can be safely assessed and treated via synchronous audio and visual telemedicine encounter.      Reason for Telemedicine Visit: Services only offered telehealth    Originating Site (Patient Location): Patient's home    Distant Site (Provider Location): Provider Remote Setting    Consent:  The patient/guardian has verbally consented to: the potential risks and benefits of telemedicine (video visit) versus in person care; bill my insurance or make self-payment for services provided; and responsibility for payment of non-covered services.     Mode of Communication:  Video Conference via [employer]    As the provider I attest to compliance with applicable laws and regulations related to telemedicine.    Summary of Group / Topics Discussed:  DDP Relapse Prevention: Stages of Change: Patients explored the process and types of change in relation to substance use, including but not limited to: theories of change, steps to making change, methods of changing behavior, and potential barriers to implementing change. Patients discussed their current and past experiences with managing change in relation to substance use and what stage of change they currently identify with. Patients identified what changes may benefit their daily lives and how they can work towards implementing change.    Patient Session Goals / Objectives:    Gained understanding of the change process    Identified positive and negative behavioral patterns    Made plans to track and implement changes and shared experiences in group    Identified personal  barriers to change        Patient Participation / Response:  Fully participated with the group by sharing personal reflections / insights and openly received / provided feedback with other participants.    Demonstrated understanding of topics discussed through group discussion and participation, Demonstrated understanding of utilizing relapse prevention skills to manage urges and maintain sobriety and Verbalized understanding of how relapse prevention skills can assist in maintaining sobriety    Treatment Plan:  Patient has a current master individualized treatment plan.  See Epic treatment plan for more information.    Lenroe French, [facility]

## 2021-02-02 NOTE — PROGRESS NOTES
Subjective:  HPI  Physical Exam                    Objective:  System    Physical Exam    General     ROS    Assessment/Plan:    PROGRESS  REPORT    Progress reporting period is from 12/17/2020 to 2/2/2021.       SUBJECTIVE  Subjective changes noted by patient:  Patient stated he was not feeling very well last week so he cancelled PT.  Patient stated since he was last seen he has noted a little more discomfort/tingling in both arches of his feet.  Patient is not sure if the foot capsulitis has returned or if not being seen last week is causing more pain.    Current pain level is : 2/10.     Previous pain level was  : 2/10.   Changes in function:  Yes, Minimal changes noted  Adverse reaction to treatment or activity: activity - increase pain    OBJECTIVE  Changes noted in objective findings:      Palpation: tenderness noted instep of both feet (along longitudinal arch).    Ankle AROM is WNL Bilaterally.    Proprioception: not assessed  Gait: appears to be normal.     ASSESSMENT/PLAN  Updated problem list and treatment plan: Diagnosis 1:  Bilateral Foot Pain (capsulitis)  Pain -  hot/cold therapy, US and manual therapy  Impaired muscle performance - neuro re-education and home program  Decreased function - therapeutic activities and home program  STG/LTGs have been met or progress has been made towards goals:  Yes (See Goal flow sheet completed today.)  Assessment of Progress: The patient's condition is improving.  The patient's condition has potential to improve.  Self Management Plans:  Patient has been instructed in a home treatment program.  I have re-evaluated this patient and find that the nature, scope, duration and intensity of the therapy is appropriate for the medical condition of the patient.  Joel continues to require the following intervention to meet STG and LTG's:  PT    Recommendations:  This patient would benefit from continued therapy.     Frequency:  1 X week, once daily  Duration:  for 2  weeks  This was the original plan per the evaluation on 12/17/2020.        Please refer to the daily flowsheet for treatment today, total treatment time and time spent performing 1:1 timed codes.

## 2021-02-02 NOTE — PROGRESS NOTES
Acknowledgement of Current Treatment Plan       I have reviewed my treatment plan with my therapist on 2/2/21.   I agree with the plan as it is written in the electronic health record. (6A)    Name:      Signature:  Joel Pineda   Unavailable to sign due to COVID-19   Dr Raza Leonard MD  Psychiatrist    Lenore French, [facility] [place]  Psychotherapist Lenore French [facility] on 2/2/2021 at 11:53 AM     Morenita Lau [facility]  Psychotherapist

## 2021-02-03 ENCOUNTER — TELEPHONE (OUTPATIENT)
Dept: NEUROSURGERY | Facility: CLINIC | Age: 46
End: 2021-02-03

## 2021-02-03 ENCOUNTER — HOSPITAL ENCOUNTER (OUTPATIENT)
Dept: BEHAVIORAL HEALTH | Facility: CLINIC | Age: 46
End: 2021-02-03
Attending: PSYCHIATRY & NEUROLOGY
Payer: MEDICARE

## 2021-02-03 ENCOUNTER — MYC MEDICAL ADVICE (OUTPATIENT)
Dept: PULMONOLOGY | Facility: CLINIC | Age: 46
End: 2021-02-03

## 2021-02-03 DIAGNOSIS — J45.30 MILD PERSISTENT ASTHMA, UNSPECIFIED WHETHER COMPLICATED: ICD-10-CM

## 2021-02-03 PROCEDURE — 90853 GROUP PSYCHOTHERAPY: CPT | Mod: PO | Performed by: COUNSELOR

## 2021-02-03 PROCEDURE — 90853 GROUP PSYCHOTHERAPY: CPT | Mod: GT | Performed by: COUNSELOR

## 2021-02-03 NOTE — GROUP NOTE
Psychotherapy Group Note    PATIENT'S NAME: Joel Pineda  MRN:   [id number]  :   1975  ACCT. NUMBER: [id number]  DATE OF SERVICE: 21  START TIME: 11:00 AM  END TIME: 11:50 AM  FACILITATOR: Morenita Lau LPCC  TOPIC: MH EBP Group: Emotions Management  [facility] Mental Health Day Treatment  TRACK: 6A    NUMBER OF PARTICIPANTS: 7    Summary of Group / Topics Discussed:  Emotions Management: Model of Emotions: Patients were introduced to the cyclical model of emotions.  Explored emotions are shaped by different events and one s interpretation of events.  Group discussed how emotions begin with an event, followed by one s interpretation, followed by associated feelings.  Discussion included a review of personal urges and actions that can/do follow an emotional experience in the patient s life, and the end results.    Patient Session Goals / Objectives:    Demonstrate understanding of types various emotions.    Identify and discuss specific emotions and when they occur; understand triggers.    Identify individual emotions and physical sensations that accompany them.    Discuss urges and actions, and how to influence the intensity of emotional reactions and disrupt the cycle.      Discuss barriers to emotional regulation.    Choose 1-2 strategies to assist with emotional response to potentially distressing situations.    Telemedicine Visit: The patient's condition can be safely assessed and treated via synchronous audio and visual telemedicine encounter.      Reason for Telemedicine Visit: Services only offered telehealth and due to COVID-19    Originating Site (Patient Location): Patient's home    Distant Site (Provider Location): Provider Remote Setting    Consent:  The patient/guardian has verbally consented to: the potential risks and benefits of telemedicine (video visit) versus in person care; bill my insurance or make self-payment for services provided; and responsibility for payment  of non-covered services.     Mode of Communication:  Video Conference via [employer]    As the provider I attest to compliance with applicable laws and regulations related to telemedicine.        Patient Participation / Response:  Fully participated with the group by sharing personal reflections / insights and openly received / provided feedback with other participants.    Demonstrated understanding of topics discussed through group discussion and participation, Expressed understanding of the relevance / importance of emotions management skills at distressing times in life and Self-aware of experiences with difficult emotions, and strategies to employ to manage them    Treatment Plan:  Patient has a current master individualized treatment plan.  See Epic treatment plan for more information.    Morenita Lau, [facility]

## 2021-02-03 NOTE — GROUP NOTE
"Process Group Note    PATIENT'S NAME: Joel Pineda  MRN:   [id number]  :   1975  ACCT. NUMBER: [id number]  DATE OF SERVICE: 21  START TIME: 10:00 AM  END TIME: 10:50 AM  FACILITATOR: Lenore French [facility]  TOPIC:  Process Group    Diagnoses:  296.33 (F33.2) Major Depressive Disorder, Recurrent Episode, Severe _.  4. Other Diagnoses that is relevant to services:   300.00 (F41.9) Unspecified Anxiety Disorder  301.83 (F60.3) Borderline Personality Disorder.      [facility] Mental Health Day Treatment  TRACK: 6A    NUMBER OF PARTICIPANTS: 7    Telemedicine Visit: The patient's condition can be safely assessed and treated via synchronous audio and visual telemedicine encounter.      Reason for Telemedicine Visit: Services only offered telehealth    Originating Site (Patient Location): Patient's home    Distant Site (Provider Location): Provider Remote Setting    Consent:  The patient/guardian has verbally consented to: the potential risks and benefits of telemedicine (video visit) versus in person care; bill my insurance or make self-payment for services provided; and responsibility for payment of non-covered services.     Mode of Communication:  Video Conference via [employer]    As the provider I attest to compliance with applicable laws and regulations related to telemedicine.            Data:    Session content: At the start of this group, patients were invited to check in by identifying themselves, describing their current emotional status, and identifying issues to address in this group.   Area(s) of treatment focus addressed in this session included Symptom Management and Personal Safety.    Tito reported feeling like \"a shell of myself\" today.  His goal for the day is to practice some grounding.  Writer and client took some time to discuss some various ways to practice grounding.    Therapeutic Interventions/Treatment Strategies:  Psychotherapist offered support, feedback and " "validation and reinforced use of skills. Treatment modalities used include Motivational Interviewing, Cognitive Behavioral Therapy and Dialectical Behavioral Therapy. Interventions include Coping Skills: Discussed how the use of intentional \"in the moment\" actions can help reduce distress.    Assessment:    Patient response:   Patient responded to session by accepting feedback and giving feedback    Possible barriers to participation / learning include: and no barriers identified    Health Issues:   Yes: Pain, Associated Psychological Distress       Substance Use Review:   Substance Use: No active concerns identified.    Mental Status/Behavioral Observations  Appearance:   Appropriate   Eye Contact:   Good   Psychomotor Behavior: Normal   Attitude:   Cooperative   Orientation:   All  Speech   Rate / Production: Normal    Volume:  Normal   Mood:    Depressed   Affect:    Appropriate   Thought Content:   Clear  Thought Form:  Coherent  Logical     Insight:    Good     Plan:     Safety Plan: No current safety concerns identified.  Recommended that patient call 911 or go to the local ED should there be a change in any of these risk factors.     Barriers to treatment: None identified    Patient Contracts (see media tab):  None    Substance Use: Not addressed in session     Continue or Discharge: Patient will continue in Adult Day Treatment (ADT)  as planned. Patient is likely to benefit from learning and using skills as they work toward the goals identified in their treatment plan.      Lenore French, [facility]  February 3, 2021  "

## 2021-02-03 NOTE — TELEPHONE ENCOUNTER
SPINE PATIENTS - NEW PROTOCOL PREVISIT    RECORDS RECEIVED FROM: Internal   Date of Appt: 2/11/21   NOTES (FOR ALL VISITS) STATUS DETAILS   OFFICE NOTE from referring provider Internal Dr Farida Hodgson @ [facility]:  2/1/21   OFFICE NOTE from other specialist N/A    DISCHARGE SUMMARY from hospital N/A    DISCHARGE REPORT from ER N/A    EMG REPORT internal [facility]:  3/22/19   MEDICATION LIST Internal    IMAGING  (FOR ALL VISITS)     MRI (HEAD, NECK, SPINE) Internal  [place]:  MRI Cervical Spine 1/26/21   XRAY (SPINE) *NEUROSURGERY* N/A    CT (HEAD, NECK, SPINE) N/A

## 2021-02-03 NOTE — TELEPHONE ENCOUNTER
Pending Prescriptions:                       Disp   Refills    fluticasone-salmeterol (ADVAIR) 500-50 MC*3 each 3            Sig: Inhale 1 puff into the lungs every 12 hours    Last Prescribed: 2/3/20  Last Visit: 6/11/20  Next Visit: Due in May     Jones Alvarado RN   [facility] Care Coordinator  [facility]   Phone: [phone]  Fax: [phone]

## 2021-02-03 NOTE — GROUP NOTE
Psychotherapy Group Note    PATIENT'S NAME: Joel Pineda  MRN:   [id number]  :   1975  ACCT. NUMBER: [id number]  DATE OF SERVICE: 21  START TIME:  9:00 AM  END TIME:  9:50 AM  FACILITATOR: Morenita Lau LPCC  TOPIC: MH EBP Group: Self-Awareness  [facility] Adult Mental Health Day Treatment  TRACK: 6A    NUMBER OF PARTICIPANTS: 7    Summary of Group / Topics Discussed:  Self-Awareness: Self-Compassion: Patients received overview of key concepts in developing self-compassion. Patients discussed mindfulness, self-kindness, and finding common humanity. Patients identified their current approach to problems in their lives and learned skills for increasing self-compassion. Patients identified ways they can put self-compassion skills into practice and problem solve barriers to application of skills.     Patient Session Goals / Objectives:    [place] components of self-compassion    Identify ways to practice self-compassion in daily life    Problem solve barriers to self-compassion practice    Telemedicine Visit: The patient's condition can be safely assessed and treated via synchronous audio and visual telemedicine encounter.      Reason for Telemedicine Visit: Services only offered telehealth and due to COVID-19    Originating Site (Patient Location): Patient's home    Distant Site (Provider Location): Provider Remote Setting    Consent:  The patient/guardian has verbally consented to: the potential risks and benefits of telemedicine (video visit) versus in person care; bill my insurance or make self-payment for services provided; and responsibility for payment of non-covered services.     Mode of Communication:  Video Conference via [employer]    As the provider I attest to compliance with applicable laws and regulations related to telemedicine.        Patient Participation / Response:  Fully participated with the group by sharing personal reflections / insights and openly received / provided  feedback with other participants.    Demonstrated understanding of topics discussed through group discussion and participation, Demonstrated understanding of values, strengths, and challenges to learn about themselves and Identified / Expressed readiness to act intentionally, increase self-compassion, promote personal growth    Treatment Plan:  Patient has a current master individualized treatment plan.  See Epic treatment plan for more information.    Morenita Lau, [facility]

## 2021-02-05 ENCOUNTER — MYC MEDICAL ADVICE (OUTPATIENT)
Dept: FAMILY MEDICINE | Facility: CLINIC | Age: 46
End: 2021-02-05

## 2021-02-05 ENCOUNTER — HOSPITAL ENCOUNTER (OUTPATIENT)
Dept: BEHAVIORAL HEALTH | Facility: CLINIC | Age: 46
End: 2021-02-05
Attending: PSYCHIATRY & NEUROLOGY
Payer: MEDICARE

## 2021-02-05 PROCEDURE — 90853 GROUP PSYCHOTHERAPY: CPT | Mod: PO | Performed by: COUNSELOR

## 2021-02-05 PROCEDURE — 90853 GROUP PSYCHOTHERAPY: CPT | Mod: GT | Performed by: COUNSELOR

## 2021-02-05 NOTE — GROUP NOTE
"Process Group Note    PATIENT'S NAME: Joel Pineda  MRN:   [id number]  :   1975  ACCT. NUMBER: [id number]  DATE OF SERVICE: 21  START TIME:  9:00 AM  END TIME:  9:50 AM  FACILITATOR: Lenore French [facility]  TOPIC:  Process Group    Diagnoses:  296.33 (F33.2) Major Depressive Disorder, Recurrent Episode, Severe _.  4. Other Diagnoses that is relevant to services:   300.00 (F41.9) Unspecified Anxiety Disorder  301.83 (F60.3) Borderline Personality Disorder.      [facility] Mental Health Day Treatment  TRACK: 6A    NUMBER OF PARTICIPANTS: 6    Telemedicine Visit: The patient's condition can be safely assessed and treated via synchronous audio and visual telemedicine encounter.      Reason for Telemedicine Visit: Services only offered telehealth    Originating Site (Patient Location): Patient's home    Distant Site (Provider Location): Provider Remote Setting    Consent:  The patient/guardian has verbally consented to: the potential risks and benefits of telemedicine (video visit) versus in person care; bill my insurance or make self-payment for services provided; and responsibility for payment of non-covered services.     Mode of Communication:  Video Conference via [employer]    As the provider I attest to compliance with applicable laws and regulations related to telemedicine.            Data:    Session content: At the start of this group, patients were invited to check in by identifying themselves, describing their current emotional status, and identifying issues to address in this group.   Area(s) of treatment focus addressed in this session included Symptom Management and Personal Safety.    Gal reported feeling \"pretty good\" today.  His goal for the day is to do some housekeeping and decrease suicical ideation over the weekend by doing some meditation and grounding exercises.  Client declined additional process time but contributed to group discussion and provided feedback and " support to peers.      Therapeutic Interventions/Treatment Strategies:  Psychotherapist offered support, feedback and validation.    Assessment:    Patient response:   Patient responded to session by accepting feedback, giving feedback and listening    Possible barriers to participation / learning include: and no barriers identified    Health Issues:   None reported       Substance Use Review:   Substance Use: No active concerns identified.    Mental Status/Behavioral Observations  Appearance:   Appropriate   Eye Contact:   Good   Psychomotor Behavior: Normal   Attitude:   Cooperative   Orientation:   All  Speech   Rate / Production: Normal    Volume:  Normal   Mood:    Depressed   Affect:    Appropriate   Thought Content:   Clear  Thought Form:  Coherent  Logical     Insight:    Good     Plan:     Safety Plan: No current safety concerns identified.  Recommended that patient call 911 or go to the local ED should there be a change in any of these risk factors.     Barriers to treatment: None identified    Patient Contracts (see media tab):  None    Substance Use: Not addressed in session     Continue or Discharge: Patient will continue in Adult Day Treatment (ADT)  as planned. Patient is likely to benefit from learning and using skills as they work toward the goals identified in their treatment plan.      Lenore French, [facility]  February 5, 2021

## 2021-02-05 NOTE — GROUP NOTE
Psychotherapy Group Note    PATIENT'S NAME: Joel Pineda  MRN:   [id number]  :   1975  ACCT. NUMBER: [id number]  DATE OF SERVICE: 21  START TIME: 10:00 AM  END TIME: 10:50 AM  FACILITATOR: Morenita Lau LPCC  TOPIC: MH EBP Group: Cognitive Restructuring  [facility] Adult Mental Health Day Treatment  TRACK: 6A    NUMBER OF PARTICIPANTS: 7    Summary of Group / Topics Discussed:  Cognitive Restructuring: Distortions: Patients received an overview of how to identify common cognitive distortions. Patients will explore alternatives to cognitive distortions and practice challenging their negative thought patterns. The goal is to help patients target modify ineffective thought patterns.     Patient Session Goals / Objectives:    Familiarized self with ineffective / unhealthy thoughts and how they develop.      Explored impact of ineffective thoughts / distortions on mood and activity    Formulated new neutral/positive alternatives to challenge less helpful / ineffective thoughts.    Practiced and plan to apply in daily life    Telemedicine Visit: The patient's condition can be safely assessed and treated via synchronous audio and visual telemedicine encounter.      Reason for Telemedicine Visit: Services only offered telehealth and due to COVID-19    Originating Site (Patient Location): Patient's home    Distant Site (Provider Location): Provider Remote Setting    Consent:  The patient/guardian has verbally consented to: the potential risks and benefits of telemedicine (video visit) versus in person care; bill my insurance or make self-payment for services provided; and responsibility for payment of non-covered services.     Mode of Communication:  Video Conference via [employer]    As the provider I attest to compliance with applicable laws and regulations related to telemedicine.             Patient Participation / Response:  Fully participated with the group by sharing personal reflections /  insights and openly received / provided feedback with other participants.    Demonstrated understanding of topics discussed through group discussion and participation, Expressed understanding of the relationship between behaviors, thoughts, and feelings and Demonstrated knowledge of personal thought patterns and how they impact their mood and behavior.    Treatment Plan:  Patient has a current master individualized treatment plan.  See Epic treatment plan for more information.    Morenita Lau, LASHELLC

## 2021-02-05 NOTE — GROUP NOTE
Psychotherapy Group Note    PATIENT'S NAME: Joel Pineda  MRN:   [id number]  :   1975  ACCT. NUMBER: [id number]  DATE OF SERVICE: 21  START TIME: 11:00 AM  END TIME: 11:50 AM  FACILITATOR: Morenita Lau LPCC  TOPIC: MH EBP Group: Coping Skills  [facility] Adult Mental Health Day Treatment  TRACK: 6A    NUMBER OF PARTICIPANTS: 6    Summary of Group / Topics Discussed:  Coping Skills: Additional Coping Skills:  Patients discussed and practiced PLEASE.  Reviewed the benefits of applying the aforementioned coping strategies.  Patients explored how these strategies might be applied to daily stressors or distressing situations.    Patient Session Goals / Objectives:    Understand the purpose and benefits of applying PLEASE coping strategies    Address barriers to utilizing coping skills when in distress.    Telemedicine Visit: The patient's condition can be safely assessed and treated via synchronous audio and visual telemedicine encounter.      Reason for Telemedicine Visit: Services only offered telehealth and due to COVID-19    Originating Site (Patient Location): Patient's home    Distant Site (Provider Location): Provider Remote Setting    Consent:  The patient/guardian has verbally consented to: the potential risks and benefits of telemedicine (video visit) versus in person care; bill my insurance or make self-payment for services provided; and responsibility for payment of non-covered services.     Mode of Communication:  Video Conference via [employer]    As the provider I attest to compliance with applicable laws and regulations related to telemedicine.        Patient Participation / Response:  Fully participated with the group by sharing personal reflections / insights and openly received / provided feedback with other participants.    Demonstrated understanding of topics discussed through group discussion and participation, Expressed understanding of the relevance / importance of  coping skills at distressing times in life and Demonstrated knowledge of when to consider using a variety of coping skills in daily life    Treatment Plan:  Patient has a current master individualized treatment plan.  See Epic treatment plan for more information.    Morenita Lau, [facility]

## 2021-02-08 ENCOUNTER — PATIENT OUTREACH (OUTPATIENT)
Dept: CARE COORDINATION | Facility: CLINIC | Age: 46
End: 2021-02-08

## 2021-02-08 DIAGNOSIS — R07.9 CHEST PAIN: Primary | ICD-10-CM

## 2021-02-08 NOTE — PROGRESS NOTES
Medication Therapy Management (MTM) Encounter    ASSESSMENT:                            Medication Adherence/Access: No issues identified    Asthma: Stable    Ankylosing Spondilitis: Stable    Mood/Anxiety/Insomnia: Stable; follow closely with psychiatry    Migraine: Stable    Vitamin D Deficiency: Would benefit from updated Vitamin D lab    Hypertension: Stable    Pain: Stable; patient followed closely by pain clinic    Hyperlidemia: Stable    Supplements: Would benefit from updated Vitamin B12 lab    Allergies: Stable.    Hypothyroidism: Would benefit from updated thyroid lab    GERD: Stable    Nausea: Stable    Diabetes: Would benefit from checking blood sugars 2 hours after meals to assess if dose of glipizide is appropriate.    Constipation: Stable    PLAN:                            Recommend Vitamin D, B12, and thyroid labs. Orders placed  Recommend checking blood sugars 2 hours after meals    Follow-up: 10 weeks    SUBJECTIVE/OBJECTIVE:                          Joel Pineda is a 45 year old male called for an initial visit for 2021; follow up from 12/8/2020. He was referred to me from Ksenia Lyles.      Reason for visit: Medication review.    Allergies/ADRs: Reviewed in chart  Tobacco: He reports that he has never smoked. He has never used smokeless tobacco.  Alcohol: none  Caffeine: 36 ounces/day of coffee  Activity: None  Past Medical History: Reviewed in chart    Medication Adherence/Access: no issues reported    Was in the ED for chest pain over the weekend. His blood pressure was high but all of his other tests were normal.     Asthma:  Current asthma medications: Advair 500/50 1 puff twice daily, Albuterol MDI/nebs (uses maybe once every 10 days; has not had to to use nebs recently), montelukast 10mg daily.  Asthma triggers include: upper respiratory infections. Pt reports the following symptoms: none.  AAP on file: YES; needs updating  PIF was completed today: No  ACT Total Scores  12/16/2019 5/18/2020 12/21/2020   ACT TOTAL SCORE - - -   ASTHMA ER VISITS - - -   ASTHMA HOSPITALIZATIONS - - -   ACT TOTAL SCORE (Goal Greater than or Equal to 20) 20 20 20   In the past 12 months, how many times did you visit the emergency room for your asthma without being admitted to the hospital? 0 0 0   In the past 12 months, how many times were you hospitalized overnight because of your asthma? 0 0 0     Ankylosing Spondylitis: Current medications include Enbrel 50mg weekly. Patient was switched from Humira to Enbrel because of headaches and burning mouth. Patient follows with Dr. Baxter, Rheumatology. Patient is unsure if Enbrel is helpful. Before Enbrel injection he will take famotidine 20mg to prevent any reactions.      Mood/Anxiety/Insomnia: current therapy includes lamictal 200mg daily, duloxetine 60mg twice daily, clonazepam 0.5mg at bedtime, quetiapine 100mg at bedtime,  melatonin 9mg at bedtime.  Patient's psychiatrist is Dr. Rodriguez at [facility] in [place]. Transferred to another therapist that does some chronic pain and trauma work. Has been doing more meditation.  Patient is in an IOP program and next week is his last week but he may add another 2 weeks. If he doesn't do 2 week extension he will enroll in an after care program. Patient does feel sedated with his current medications but feels this is a necessary evil.      Migraine:  Current therapy includes rizatriptan 5mg MLT as needed. Patient reports using about once every 10 days. Patient is having 3-5 headaches per month. Patient reports this is effective for relieving the headache.     Vitamin D Deficiency: current therapy includes Vitamin D 50,000 units every two weeks. Patient's last level in 2019 was within normal limits.     Hypertension: Current medications include metoprolol XL 200mg twice daily, ramipril 10mg daily. Patient does not self-monitor BP but has a machine.  Patient does have some minor light  headedness.  BP Readings from Last 3 Encounters:   01/25/21 137/89   12/21/20 136/85   10/14/20 (!) 131/98     Pain: current therapy includes APAP as needed-patient is taking  1000 mg 2 times daily, lyrica 150mg twice daily, oxycodone 5-10mg every 6 hours as needed maximum of 4 tablets/day (taking 1-2 tablets a day), baclofen 5-10mg twice daily, nabumetone 500-1000mg twice daily (has been taking 1000mg twice daily), Narcan nasal spray as needed. Patient has not had to take any oxycodone since he started Enbrel. Before he was averaging about 2 tablets every other day. Baclofen has been helpful. Patient is doing physical therapy at [facility]. Patient does report baclofen does causes some minor drowsiness.     Hyperlipidemia: Current therapy includes rosuvastatin 40mg once daily and Lovaza 2gm twice daily. Patient denies side effects.   LDL Cholesterol Calculated   Date Value Ref Range Status   10/16/2020 45 <100 mg/dL Final     Comment:     Desirable:       <100 mg/dl     Supplements: current therapy includes Vitamin B 12 inj 1000mcg every 30 days. Last Vitamin B12 level was 1/2020 and was within normal limits. Stress tab daily, zinc-Vitamin C daily.    Allergies: current therapy includes cetirizine 10mg daily, fluticasone nasal spray 1 spray each nostril daily (patient has not been using this recently because this was causing some congestion so he has been holding for now). Patient is tolerating and working well. Flonase works better for itchy eyes better than zyretc.    Hypothyroidism: Patient is taking levothyroxine 75 mcg daily. Patient is having the following symptoms: none.   TSH   Date Value Ref Range Status   01/24/2020 2.17 0.40 - 4.00 mU/L Final     GERD: Current medications include: Prilosec (omeprazole) 20 once daily. Pt c/o no current symptoms.  Patient feels that current regimen is effective.    Nausea: current therapy includes ondansetron ODT 8mg every 8 hours as needed. Nausea has been well controlled.  Using nausea about once a week.    Diabetes:  Pt currently taking glipizide xl 5mg daily. Pt is not experiencing side effects.  SMBG: rarely.     Patient did have a day where he experienced symptoms of hypoglycemia. Patient had an extra glass of OJ for breakfast (9am). Later that day (1230)  he was shaking and sweating, nauseated. Patient did not have lunch. Patient did not check his blood sugar. He had a PB&J and felt better after that.   Recent symptoms of high blood sugar? none  Eye exam: due  Foot exam: due  ACEi/ARB: Yes: Ramipril.   Urine Albumin:   Lab Results   Component Value Date    UMALCR 21.38 (H) 01/24/2020     Hemoglobin A1C   Date Value Ref Range Status   10/16/2020 6.0 (H) 0 - 5.6 % Final     Comment:     Normal <5.7% Prediabetes 5.7-6.4%  Diabetes 6.5% or higher - adopted from ADA   consensus guidelines.        Aspirin: Taking 81mg daily and denies side effects    Constipation: current therapy includes miralax 17gm daily.  Patient has not had to use lately.  Today's Vitals: There were no vitals taken for this visit.  ----------------    I spent 50 minutes with this patient today. All changes were made via collaborative practice agreement with Ksenia Lyles . A copy of the visit note was provided to the patient's primary care provider.    The patient was sent via [employer] a summary of these recommendations.     Radha Mcdonald, Pharm.D, BCACP  Medication Therapy Management Pharmacist    Telemedicine Visit Details  Type of service:  Telephone visit  Start Time: 2:31 PM  End Time: 3:21PM  Originating Location (patient location): Home  Distant Location (provider location):  [facility]      Medication Therapy Recommendations  Takes dietary supplements    Current Medication: cyanocobalamin (CYANOCOBALAMIN) 1000 MCG/ML injection   Rationale: Medication requires monitoring - Needs additional monitoring   Recommendation: Order Lab   Status: Accepted per CPA          Current  Medication: cholecalciferol (VITAMIN D3) 16409 units (1250 mcg) capsule   Rationale: Medication requires monitoring - Needs additional monitoring   Recommendation: Order Lab - also needs Vitamin B12 level and TSH   Status: Accepted per CPA         Type 2 diabetes mellitus with complication, without long-term current use of insulin (H)    Current Medication: glipiZIDE (GLUCOTROL XL) 5 MG 24 hr tablet   Rationale: Medication requires monitoring - Needs additional monitoring   Recommendation: Self-Monitoring   Status: Patient Agreed - Adherence/Education

## 2021-02-08 NOTE — PROGRESS NOTES
Clinic Care Coordination Contact  Community Health Worker Initial Outreach            Patient accepts CC: No, The patient declined care coordination at this time. . Patient will be sent Care Coordination introduction letter for future reference.     The Clinic Community Health Worker spoke with the patient today at the request of the PCP to discuss possible Clinic Care Coordination enrollment. The service was described to the patient and immediate needs were discussed. The patient declined enrollment at this time. The PCP is encouraged to refer in the future if the patient's needs change.    The patient called the CHW back and stated that he is doing better since coming home from the ER. The patient does has a concern with high blood pressure and the CHW asked if he wanted to discuss that further with the RN CC and the patient declined, he said that he has an appointment with his provider this week and will discuss it then.     Jing Lynch  Community Health Worker   [facility]  Care Coordination  [facility]  [email or web address]   Office: [phone]  Fax: [phone]

## 2021-02-08 NOTE — PROGRESS NOTES
Clinic Care Coordination Contact  [facility]/Voicemail       Clinical Data: CHW Outreach  Outreach attempted x 1. Left message on patient's voicemail with call back information and requested return call.    Plan: CHW will try to reach patient again in 1-2 business days.    Jing Lynch  Community Health Worker   [facility]  Care Coordination  [facility]  [email or web address]   Office: [phone]  Fax: [phone]

## 2021-02-09 ENCOUNTER — VIRTUAL VISIT (OUTPATIENT)
Dept: PHARMACY | Facility: CLINIC | Age: 46
End: 2021-02-09
Payer: COMMERCIAL

## 2021-02-09 ENCOUNTER — HOSPITAL ENCOUNTER (OUTPATIENT)
Dept: BEHAVIORAL HEALTH | Facility: CLINIC | Age: 46
End: 2021-02-09
Attending: PSYCHIATRY & NEUROLOGY
Payer: MEDICARE

## 2021-02-09 ENCOUNTER — MYC REFILL (OUTPATIENT)
Dept: FAMILY MEDICINE | Facility: CLINIC | Age: 46
End: 2021-02-09

## 2021-02-09 DIAGNOSIS — E11.9 TYPE 2 DIABETES MELLITUS WITHOUT COMPLICATION, WITHOUT LONG-TERM CURRENT USE OF INSULIN (H): ICD-10-CM

## 2021-02-09 DIAGNOSIS — E78.5 HYPERLIPIDEMIA LDL GOAL <100: ICD-10-CM

## 2021-02-09 DIAGNOSIS — E53.8 VITAMIN B12 DEFICIENCY (NON ANEMIC): Primary | ICD-10-CM

## 2021-02-09 DIAGNOSIS — G89.4 CHRONIC PAIN SYNDROME: ICD-10-CM

## 2021-02-09 DIAGNOSIS — F41.8 DEPRESSION WITH ANXIETY: ICD-10-CM

## 2021-02-09 DIAGNOSIS — G47.00 INSOMNIA, UNSPECIFIED TYPE: ICD-10-CM

## 2021-02-09 DIAGNOSIS — I10 ESSENTIAL HYPERTENSION WITH GOAL BLOOD PRESSURE LESS THAN 140/90: ICD-10-CM

## 2021-02-09 DIAGNOSIS — K59.00 CONSTIPATION, UNSPECIFIED CONSTIPATION TYPE: ICD-10-CM

## 2021-02-09 DIAGNOSIS — M45.8 ANKYLOSING SPONDYLITIS OF SACRAL REGION (H): ICD-10-CM

## 2021-02-09 DIAGNOSIS — E03.9 ACQUIRED HYPOTHYROIDISM: ICD-10-CM

## 2021-02-09 DIAGNOSIS — F41.9 ANXIETY: ICD-10-CM

## 2021-02-09 DIAGNOSIS — K21.9 GASTROESOPHAGEAL REFLUX DISEASE WITHOUT ESOPHAGITIS: ICD-10-CM

## 2021-02-09 DIAGNOSIS — M51.369 DDD (DEGENERATIVE DISC DISEASE), LUMBAR: ICD-10-CM

## 2021-02-09 DIAGNOSIS — E55.9 VITAMIN D DEFICIENCY: ICD-10-CM

## 2021-02-09 DIAGNOSIS — F33.3 SEVERE EPISODE OF RECURRENT MAJOR DEPRESSIVE DISORDER, WITH PSYCHOTIC FEATURES (H): ICD-10-CM

## 2021-02-09 DIAGNOSIS — J45.31 MILD PERSISTENT ASTHMA WITH ACUTE EXACERBATION: ICD-10-CM

## 2021-02-09 DIAGNOSIS — Z78.9 TAKES DIETARY SUPPLEMENTS: ICD-10-CM

## 2021-02-09 DIAGNOSIS — J30.1 ALLERGIC RHINITIS DUE TO POLLEN, UNSPECIFIED SEASONALITY: ICD-10-CM

## 2021-02-09 PROCEDURE — 99605 MTMS BY PHARM NP 15 MIN: CPT | Mod: TEL | Performed by: PHARMACIST

## 2021-02-09 PROCEDURE — 90853 GROUP PSYCHOTHERAPY: CPT | Mod: GT | Performed by: COUNSELOR

## 2021-02-09 PROCEDURE — 99606 MTMS BY PHARM EST 15 MIN: CPT | Mod: TEL | Performed by: PHARMACIST

## 2021-02-09 PROCEDURE — 90853 GROUP PSYCHOTHERAPY: CPT | Mod: PO | Performed by: COUNSELOR

## 2021-02-09 RX ORDER — CLOTRIMAZOLE 1 %
CREAM (GRAM) TOPICAL
COMMUNITY
Start: 2021-01-23 | End: 2021-02-26

## 2021-02-09 RX ORDER — OXYCODONE HYDROCHLORIDE 5 MG/1
5-10 TABLET ORAL EVERY 6 HOURS PRN
Qty: 35 TABLET | Refills: 0 | Status: SHIPPED | OUTPATIENT
Start: 2021-02-09 | End: 2021-03-07

## 2021-02-09 NOTE — GROUP NOTE
Psychotherapy Group Note    PATIENT'S NAME: Joel Pineda  MRN:   [id number]  :   1975  ACCT. NUMBER: [id number]  DATE OF SERVICE: 21  START TIME: 10:00 AM  END TIME: 10:50 AM  FACILITATOR: Lenore French LPCC  TOPIC:  EBP Group: [facility] Adult Mental Health Day Treatment  TRACK: 6A    NUMBER OF PARTICIPANTS: 8    Telemedicine Visit: The patient's condition can be safely assessed and treated via synchronous audio and visual telemedicine encounter.      Reason for Telemedicine Visit: Services only offered telehealth    Originating Site (Patient Location): Patient's home    Distant Site (Provider Location): Provider Remote Setting    Consent:  The patient/guardian has verbally consented to: the potential risks and benefits of telemedicine (video visit) versus in person care; bill my insurance or make self-payment for services provided; and responsibility for payment of non-covered services.     Mode of Communication:  Video Conference via [employer]    As the provider I attest to compliance with applicable laws and regulations related to telemedicine.      Summary of Group / Topics Discussed:  Mindfulness: Mindfulness Experiential: Patients received an overview on what mindfulness is and how mindfulness can benefit general health, mental health symptoms, and stressors. The history of mindfulness, its application to mental health therapies, and key concepts were also discussed. Patients discussed current awareness, knowledge, and practice of mindfulness skills. Patients also discussed barriers to mindfulness practice.    Patient Session Goals / Objectives:    Demonstrated and verbalized understanding of key mindfulness concepts    Identified when/how to use mindfulness skills    Resolved barriers to practicing mindfulness skills    Identified plan to use mindfulness skills in daily life       Patient Participation / Response:  Fully participated with the group by sharing personal  reflections / insights and openly received / provided feedback with other participants.    Demonstrated understanding of topics discussed through group discussion and participation, Demonstrated understanding of mindfulness skills and benefits of practice and Identified / Expressed personal readiness to practice mindfulness skills    Treatment Plan:  Patient has a current master individualized treatment plan.  See Epic treatment plan for more information.    Lenore French, LPCC

## 2021-02-09 NOTE — GROUP NOTE
"Process Group Note    PATIENT'S NAME: Joel Pineda  MRN:   [id number]  :   1975  ACCT. NUMBER: [id number]  DATE OF SERVICE: 21  START TIME:  9:00 AM  END TIME:  9:50 AM  FACILITATOR: Lenore French [facility]  TOPIC:  Process Group    Diagnoses:  296.33 (F33.2) Major Depressive Disorder, Recurrent Episode, Severe _.  4. Other Diagnoses that is relevant to services:   300.00 (F41.9) Unspecified Anxiety Disorder  301.83 (F60.3) Borderline Personality Disorder.      [facility] Mental Health Day Treatment  TRACK: 6A    NUMBER OF PARTICIPANTS: 7    Telemedicine Visit: The patient's condition can be safely assessed and treated via synchronous audio and visual telemedicine encounter.      Reason for Telemedicine Visit: Services only offered telehealth    Originating Site (Patient Location): Patient's home    Distant Site (Provider Location): Provider Remote Setting    Consent:  The patient/guardian has verbally consented to: the potential risks and benefits of telemedicine (video visit) versus in person care; bill my insurance or make self-payment for services provided; and responsibility for payment of non-covered services.     Mode of Communication:  Video Conference via BigDeal    As the provider I attest to compliance with applicable laws and regulations related to telemedicine.            Data:    Session content: At the start of this group, patients were invited to check in by identifying themselves, describing their current emotional status, and identifying issues to address in this group.   Area(s) of treatment focus addressed in this session included Symptom Management and Personal Safety.    Tito reported feeling \"exhausted\" today.  He reported he was in the ER for some physical symptoms yesterday.  His goal for the day is to just get through the day and take care of his emotional and physical needs.  Client declined additional process time but contributed to group discussion and " provided feedback and support to peers.      Therapeutic Interventions/Treatment Strategies:  Psychotherapist offered support, feedback and validation.    Assessment:    Patient response:   Patient responded to session by accepting feedback, giving feedback and listening    Possible barriers to participation / learning include: and no barriers identified    Health Issues:   Yes: Pain, Associated Psychological Distress       Substance Use Review:   Substance Use: No active concerns identified.    Mental Status/Behavioral Observations  Appearance:   Appropriate   Eye Contact:   Good   Psychomotor Behavior: Normal   Attitude:   Cooperative   Orientation:   All  Speech   Rate / Production: Normal    Volume:  Normal   Mood:    Anxious  Depressed   Affect:    Appropriate   Thought Content:   Clear  Thought Form:  Coherent  Logical     Insight:    Good     Plan:     Safety Plan: No current safety concerns identified.  Recommended that patient call 911 or go to the local ED should there be a change in any of these risk factors.     Barriers to treatment: None identified    Patient Contracts (see media tab):  None    Substance Use: Not addressed in session     Continue or Discharge: Patient will continue in Adult Day Treatment (ADT)  as planned. Patient is likely to benefit from learning and using skills as they work toward the goals identified in their treatment plan.      Lenore French, [facility]  February 9, 2021

## 2021-02-09 NOTE — GROUP NOTE
Psychotherapy Group Note    PATIENT'S NAME: Joel Pineda  MRN:   [id number]  :   1975  ACCT. NUMBER: [id number]  DATE OF SERVICE: 21  START TIME: 11:00 AM  END TIME: 11:50 AM  FACILITATOR: Morenita Lau LPCC  TOPIC: MH EBP Group: Cognitive Restructuring  [facility] Adult Mental Health Day Treatment  TRACK: 6A    NUMBER OF PARTICIPANTS: 8    Summary of Group / Topics Discussed:  Cognitive Restructuring: Distortions: Patients received an overview of how to identify common cognitive distortions. Patients will explore alternatives to cognitive distortions and practice challenging their negative thought patterns. The goal is to help patients target modify ineffective thought patterns.     Patient Session Goals / Objectives:    Familiarized self with ineffective / unhealthy thoughts and how they develop.      Explored impact of ineffective thoughts / distortions on mood and activity    Formulated new neutral/positive alternatives to challenge less helpful / ineffective thoughts.    Practiced and plan to apply in daily life    Telemedicine Visit: The patient's condition can be safely assessed and treated via synchronous audio and visual telemedicine encounter.      Reason for Telemedicine Visit: Services only offered telehealth and due to COVID-19    Originating Site (Patient Location): Patient's home    Distant Site (Provider Location): Provider Remote Setting    Consent:  The patient/guardian has verbally consented to: the potential risks and benefits of telemedicine (video visit) versus in person care; bill my insurance or make self-payment for services provided; and responsibility for payment of non-covered services.     Mode of Communication:  Video Conference via Zoom  As the provider I attest to compliance with applicable laws and regulations related to telemedicine.             Patient Participation / Response:  Fully participated with the group by sharing personal reflections / insights  and openly received / provided feedback with other participants.    Demonstrated understanding of topics discussed through group discussion and participation, Expressed understanding of the relationship between behaviors, thoughts, and feelings and Demonstrated knowledge of personal thought patterns and how they impact their mood and behavior.    Treatment Plan:  Patient has a current master individualized treatment plan.  See Epic treatment plan for more information.    Morenita Lau, LASHELLC

## 2021-02-09 NOTE — TELEPHONE ENCOUNTER
Routing refill request to provider for review/approval because:  Drug not on the [facility] refill protocol     Sapna ALEJANDRON, RN, CPN

## 2021-02-10 ENCOUNTER — HOSPITAL ENCOUNTER (OUTPATIENT)
Dept: BEHAVIORAL HEALTH | Facility: CLINIC | Age: 46
End: 2021-02-10
Attending: PSYCHIATRY & NEUROLOGY
Payer: MEDICARE

## 2021-02-10 ENCOUNTER — OFFICE VISIT (OUTPATIENT)
Dept: FAMILY MEDICINE | Facility: CLINIC | Age: 46
End: 2021-02-10
Payer: MEDICARE

## 2021-02-10 VITALS
TEMPERATURE: 97.1 F | SYSTOLIC BLOOD PRESSURE: 136 MMHG | OXYGEN SATURATION: 96 % | DIASTOLIC BLOOD PRESSURE: 85 MMHG | HEART RATE: 89 BPM | BODY MASS INDEX: 39.75 KG/M2 | WEIGHT: 315 LBS

## 2021-02-10 DIAGNOSIS — E03.9 ACQUIRED HYPOTHYROIDISM: ICD-10-CM

## 2021-02-10 DIAGNOSIS — B02.29 PHN (POSTHERPETIC NEURALGIA): ICD-10-CM

## 2021-02-10 DIAGNOSIS — I10 ESSENTIAL HYPERTENSION WITH GOAL BLOOD PRESSURE LESS THAN 140/90: ICD-10-CM

## 2021-02-10 DIAGNOSIS — E53.8 VITAMIN B12 DEFICIENCY (NON ANEMIC): ICD-10-CM

## 2021-02-10 DIAGNOSIS — G62.9 PERIPHERAL POLYNEUROPATHY: ICD-10-CM

## 2021-02-10 DIAGNOSIS — E55.9 VITAMIN D DEFICIENCY: ICD-10-CM

## 2021-02-10 DIAGNOSIS — E11.8 TYPE 2 DIABETES MELLITUS WITH COMPLICATION, WITHOUT LONG-TERM CURRENT USE OF INSULIN (H): Primary | ICD-10-CM

## 2021-02-10 DIAGNOSIS — Z23 NEED FOR HEPATITIS B BOOSTER VACCINATION: ICD-10-CM

## 2021-02-10 LAB — HBA1C MFR BLD: 6.5 % (ref 0–5.6)

## 2021-02-10 PROCEDURE — 83036 HEMOGLOBIN GLYCOSYLATED A1C: CPT | Performed by: FAMILY MEDICINE

## 2021-02-10 PROCEDURE — 82607 VITAMIN B-12: CPT | Performed by: FAMILY MEDICINE

## 2021-02-10 PROCEDURE — 90853 GROUP PSYCHOTHERAPY: CPT | Mod: PO | Performed by: COUNSELOR

## 2021-02-10 PROCEDURE — 82306 VITAMIN D 25 HYDROXY: CPT | Performed by: FAMILY MEDICINE

## 2021-02-10 PROCEDURE — 36415 COLL VENOUS BLD VENIPUNCTURE: CPT | Performed by: FAMILY MEDICINE

## 2021-02-10 PROCEDURE — 84443 ASSAY THYROID STIM HORMONE: CPT | Performed by: FAMILY MEDICINE

## 2021-02-10 PROCEDURE — 90746 HEPB VACCINE 3 DOSE ADULT IM: CPT | Performed by: FAMILY MEDICINE

## 2021-02-10 PROCEDURE — 99214 OFFICE O/P EST MOD 30 MIN: CPT | Mod: 25 | Performed by: FAMILY MEDICINE

## 2021-02-10 PROCEDURE — G0010 ADMIN HEPATITIS B VACCINE: HCPCS | Performed by: FAMILY MEDICINE

## 2021-02-10 ASSESSMENT — PAIN SCALES - GENERAL: PAINLEVEL: NO PAIN (0)

## 2021-02-10 NOTE — GROUP NOTE
Psychotherapy Group Note    PATIENT'S NAME: Joel Pineda  MRN:   [id number]  :   1975  ACCT. NUMBER: [id number]  DATE OF SERVICE: 2/10/21  START TIME:  9:00 AM  END TIME:  9:50 AM  FACILITATOR: Morenita Lau LPCC  TOPIC: MH EBP Group: Specialty Awareness  [facility] Adult Mental Health Day Treatment  TRACK: 6A    NUMBER OF PARTICIPANTS: 6    Summary of Group / Topics Discussed:  Specialty Topics: Forgiveness: Patients were provided with an overview of the topic of forgiveness including how to better understand the nature of forgiveness of others and oneself. Exploring the phases of forgiveness and how one works them was covered. Patients were able to explore how practicing forgiveness may positively impact their functioning.     Patient Session Goals / Objectives:    Discussed definitions of forgiveness and self-forgiveness    Explored the phases and process of forgiveness    Discussed benefits of forgiveness to their relationships with themselves and others, connecting the concept to mindfulness and acceptance    Assisted patients in recognizing when practicing forgiveness may be helpful  Telemedicine Visit: The patient's condition can be safely assessed and treated via synchronous audio and visual telemedicine encounter.      Reason for Telemedicine Visit: Services only offered telehealth and due to COVID-19    Originating Site (Patient Location): Patient's home    Distant Site (Provider Location): Provider Remote Setting    Consent:  The patient/guardian has verbally consented to: the potential risks and benefits of telemedicine (video visit) versus in person care; bill my insurance or make self-payment for services provided; and responsibility for payment of non-covered services.     Mode of Communication:  Video Conference via Zoom    As the provider I attest to compliance with applicable laws and regulations related to telemedicine.        Patient Participation / Response:  Fully  participated with the group by sharing personal reflections / insights and openly received / provided feedback with other participants.    Demonstrated understanding of topics discussed through group discussion and participation, Identified / Expressed readiness to act on skill suggestions discussed in topic and Verbalized understanding of ways to proactively manage illness    Treatment Plan:  Patient has a current master individualized treatment plan.  See Epic treatment plan for more information.    Morenita Lau, [facility]

## 2021-02-10 NOTE — GROUP NOTE
"Process Group Note    PATIENT'S NAME: Joel Pineda  MRN:   [id number]  :   1975  ACCT. NUMBER: [id number]  DATE OF SERVICE: 2/10/21  START TIME: 10:00 AM  END TIME: 10:50 AM  FACILITATOR: Lenore French [facility]  TOPIC:  Process Group    Diagnoses:  296.33 (F33.2) Major Depressive Disorder, Recurrent Episode, Severe _.  4. Other Diagnoses that is relevant to services:   300.00 (F41.9) Unspecified Anxiety Disorder  301.83 (F60.3) Borderline Personality Disorder.      [facility] Mental Health Day Treatment  TRACK: 6A    NUMBER OF PARTICIPANTS: 7    Telemedicine Visit: The patient's condition can be safely assessed and treated via synchronous audio and visual telemedicine encounter.      Reason for Telemedicine Visit: Services only offered telehealth    Originating Site (Patient Location): Patient's home    Distant Site (Provider Location): Provider Remote Setting    Consent:  The patient/guardian has verbally consented to: the potential risks and benefits of telemedicine (video visit) versus in person care; bill my insurance or make self-payment for services provided; and responsibility for payment of non-covered services.     Mode of Communication:  Video Conference via 2heuresavant    As the provider I attest to compliance with applicable laws and regulations related to telemedicine.            Data:    Session content: At the start of this group, patients were invited to check in by identifying themselves, describing their current emotional status, and identifying issues to address in this group.   Area(s) of treatment focus addressed in this session included Symptom Management and Personal Safety.    Tito reported feeling like he \"doesn't know which was his up\" today because he had a very disruptive night sleep last night.  His goal for the day is to deal with some insurance issues.  Client declined additional process time but contributed to group discussion and provided feedback and support " to peers.      Therapeutic Interventions/Treatment Strategies:  Psychotherapist offered support, feedback and validation.    Assessment:    Patient response:   Patient responded to session by accepting feedback, giving feedback and listening    Possible barriers to participation / learning include: and no barriers identified    Health Issues:   Yes: Pain, Associated Psychological Distress       Substance Use Review:   Substance Use: No active concerns identified.    Mental Status/Behavioral Observations  Appearance:   Appropriate   Eye Contact:   Good   Psychomotor Behavior: Normal   Attitude:   Cooperative   Orientation:   All  Speech   Rate / Production: Normal    Volume:  Normal   Mood:    Depressed   Affect:    Appropriate   Thought Content:   Clear  Thought Form:  Coherent  Logical     Insight:    Good     Plan:     Safety Plan: No current safety concerns identified.  Recommended that patient call 911 or go to the local ED should there be a change in any of these risk factors.     Barriers to treatment: None identified    Patient Contracts (see media tab):  None    Substance Use: Not addressed in session     Continue or Discharge: Patient will continue in Adult Day Treatment (ADT)  as planned. Patient is likely to benefit from learning and using skills as they work toward the goals identified in their treatment plan.      Lenore French, [facility]  February 10, 2021

## 2021-02-10 NOTE — NURSING NOTE
Prior to immunization administration, verified patients identity using patient s name and date of birth. Please see Immunization Activity for additional information.     Screening Questionnaire for Adult Immunization    Are you sick today?   No   Do you have allergies to medications, food, a vaccine component or latex?   Yes   Have you ever had a serious reaction after receiving a vaccination?   No   Do you have a long-term health problem with heart, lung, kidney, or metabolic disease (e.g., diabetes), asthma, a blood disorder, no spleen, complement component deficiency, a cochlear implant, or a spinal fluid leak?  Are you on long-term aspirin therapy?   Yes   Do you have cancer, leukemia, HIV/AIDS, or any other immune system problem?   Yes   Do you have a parent, brother, or sister with an immune system problem?   No   In the past 3 months, have you taken medications that affect  your immune system, such as prednisone, other steroids, or anticancer drugs; drugs for the treatment of rheumatoid arthritis, Crohn s disease, or psoriasis; or have you had radiation treatments?   Yes   Have you had a seizure, or a brain or other nervous system problem?   No   During the past year, have you received a transfusion of blood or blood    products, or been given immune (gamma) globulin or antiviral drug?   No   For women: Are you pregnant or is there a chance you could become       pregnant during the next month?   No   Have you received any vaccinations in the past 4 weeks?   No     Immunization questionnaire was positive for at least one answer.  Provider is aware.      Patient instructed to remain in clinic for 15 minutes afterwards, and to report any adverse reaction to me immediately.       Screening performed by Millie Kaur CMA on 2/10/2021 at 5:58 PM.

## 2021-02-10 NOTE — PROGRESS NOTES
Assessment & Plan     Type 2 diabetes mellitus with complication, without long-term current use of insulin (H)  Possibly overtreated with hypoglycemic sounding episode mentioned - check a1c and consider stopping glimepiride.  Suggested the ketogenic diet as well  - Hemoglobin A1c    Essential hypertension with goal blood pressure less than 140/90  Controlled today - suggested regular home checks after sitting for 5 minutes.  Let us know if still elevated.    PHN (postherpetic neuralgia)  Patient concerned that elevated sugars will make this worse - monitor for now.    Peripheral polyneuropathy  As above    Need for hepatitis B booster vaccination    - HEPATITIS B VACCINE, ADULT, IM  - ADMIN 1st VACCINE    Reviewed A1c from 10/20 and most recent ED visit note.       Return in about 6 months (around 8/10/2021).    Cindy Webber MD  [facility]    Radha Francis is a 45 year old who presents for the following health issues     HPI       Diabetes Follow-up      How often are you checking your blood sugar? Not at all    What concerns do you have today about your diabetes? None, medication questions.      Do you have any of these symptoms? (Select all that apply)  Numbness in feet and Burning in feet    Have you had a diabetic eye exam in the last 12 months? No     Nausea and cold sweats occurred related to adding glipizide.        BP Readings from Last 2 Encounters:   02/10/21 136/85   01/25/21 137/89     Hemoglobin A1C (%)   Date Value   10/16/2020 6.0 (H)   08/18/2020 6.1 (H)     LDL Cholesterol Calculated (mg/dL)   Date Value   10/16/2020 45   09/13/2019     Cannot estimate LDL when triglyceride exceeds 400 mg/dL     LDL Cholesterol Direct (mg/dL)   Date Value   09/13/2019 64           How many servings of fruits and vegetables do you eat daily?  2-3    On average, how many sweetened beverages do you drink each day (Examples: soda, juice, sweet tea, etc.  Do NOT count  diet or artificially sweetened beverages)?   1    How many days per week do you exercise enough to make your heart beat faster? 3 or less    How many minutes a day do you exercise enough to make your heart beat faster? 9 or less    How many days per week do you miss taking your medication? 0    ED/UC Followup:    Facility:  [facility]  Date of visit: 02/06/2021  Reason for visit: Chest Pain  Current Status: Intermittent chest pains, feels like a burning sensation. Chest pains started 02/05/2021. Has history of PE and that evaluation was normal          Review of Systems   Constitutional, HEENT, cardiovascular, pulmonary, gi and gu systems are negative, except as otherwise noted.      Objective    /85 (BP Location: Right arm, Patient Position: Sitting, Cuff Size: Adult Large)   Pulse 89   Temp 97.1  F (36.2  C) (Tympanic)   Wt (!) 156 kg (344 lb)   SpO2 96%   BMI 39.75 kg/m    Body mass index is 39.75 kg/m .  Physical Exam   GENERAL: healthy, alert, no distress and obese  NECK: no adenopathy, no asymmetry, masses, or scars and thyroid normal to palpation  RESP: lungs clear to auscultation - no rales, rhonchi or wheezes  CV: regular rate and rhythm, normal S1 S2, no S3 or S4, no murmur, click or rub, no peripheral edema and peripheral pulses strong  PSYCH: mentation appears normal and anxious

## 2021-02-10 NOTE — PATIENT INSTRUCTIONS
At [facility], we strive to deliver an exceptional experience to you, every time we see you. If you receive a survey, please complete it as we do value your feedback.  If you have MyChart, you can expect to receive results automatically within 24 hours of their completion.  Your provider will send a note interpreting your results as well.   If you do not have MyChart, you should receive your results in about a week by mail.    Your care team:                            Family Medicine Internal Medicine   MD Houston Frances MD Shantel Branch-Fleming, MD Srinivasa Vaka, MD Katya Belousova, PALeydaC  Mahi Tatum, APRN CNP    Javier Cavazos, MD Pediatrics   Klever Obando, PAAIDAN Scherer, CNP MD Radha Victor APRN CNP   MD Humaira Devlin MD Deborah Mielke, MD Diana Kauffman, APRN [facility]      Clinic hours: Monday - Thursday 7 am-6 pm; Fridays 7 am-5 pm.   Urgent care: Monday - Friday 11 am-9 pm; Saturday and Sunday 9 am-5 pm.    Clinic: [phone]       [facility]: Monday - Thursday 8 am - 7 pm; Friday 8 am - 6 pm  [facility]: [phone]     Use www.oncare.org for 24/7 diagnosis and treatment of dozens of conditions.  Ketogenic eating plan    Take a look at the Paleo diet as it is a milder version of the ketogenic diet, also.    Watch this video to see why just eating less calories does not work: https://www.youtube.com/watch?v=mNYlIcXynwE.    This eating plan is recommended to you to lower you bad cholesterol, diabetes risk, blood sugars and potential liver damage.    This plan resolves around low carbohydrate/starchy food intake with moderate protein intake and high fat intake.  I recommend natural, minimally processed foods.  You can have eggs, butter, gan, full fat cheese full fat cream cheese and heavy cream.  Most nuts and seeds are benecial as well.    If you use a  calories track james, like my fitness pal or similar, I recommend 70% of calories come from fat, 25% of calories come from protein and 5% of your calories come from non starchy veggies.  Non-starchy veggies would include the vegetables that grow above ground.  If you plan to take in fruit, stick to berries and treat as a dessert. Avoid sugar, high fructose corn syrup, and corn syrup sweeteners.  It should be okay to use Splenda and stevia sweeteners. Avoid corn (this is more of a grain than a veggie), regular soda, potatoes, rice, wheat and pasta.    Please look at www.ketoconnect.BlockBeacon, www.Phobious.au, www.[employer], KetogenicCivilisedMoneydietCivilisedMoneyresource.Utrip, Google Ketogenic diets and check out Ketoconnect on YouTube.    Http://www.ncbi.nlm.nih.gov/pmc/articles/YLT8975981/ is a study which shows long term ketogenic diets are safe and work for weight loss and cholesterol improvement.    These recommendations are general and we should discuss your response to this on a regular basis so we can adjust these recommendations for you.    Note of caution: it will take about 2 weeks for the ketogenic diet to shift you into fat burning as a primary fuel source.  You may feel fatigued and have slight trouble thinking over the first 2 weeks as you shift from carbohydrate as a primary fuel source to fat as a primary source.  Taking 250 mg of Magnesium and increasing your salt intake every day will help.  (Yes one of the few times you provider will tell you to eat more salt!)

## 2021-02-10 NOTE — GROUP NOTE
Psychotherapy Group Note    PATIENT'S NAME: Joel Pineda  MRN:   [id number]  :   1975  ACCT. NUMBER: [id number]  DATE OF SERVICE: 2/10/21  START TIME: 11:00 AM  END TIME: 11:50 AM  FACILITATOR: Morenita Lau LPCC  TOPIC: MH EBP Group: Behavioral Activation  [facility] Adult Mental Health Day Treatment  TRACK: 6A    NUMBER OF PARTICIPANTS: 7    Summary of Group / Topics Discussed:  Behavioral Activation: Introduction and Overview of Behavioral Activation: Patients explored how behaviors affect mood and interact with thoughts and feelings (CBT/DBT).   Discussions included reviewing the benefits of making behavioral changes, and the barriers to doing so.  Specific skills introduced: taking small steps, increasing motivation, decreasing procrastination and withdrawal.  Encouraged patient exploration / reflection on the relationship between their behaviors, thoughts, and feelings.    Patient Session Goals / Objectives:    Understand the importance of behavioral patterns and how making behavioral changes benefits overall mental and physical health.    Share experiences and challenges with making behavioral changes      Identify personal barriers to change    Telemedicine Visit: The patient's condition can be safely assessed and treated via synchronous audio and visual telemedicine encounter.      Reason for Telemedicine Visit: Services only offered telehealth and due to COVID-19    Originating Site (Patient Location): Patient's home    Distant Site (Provider Location): Provider Remote Setting    Consent:  The patient/guardian has verbally consented to: the potential risks and benefits of telemedicine (video visit) versus in person care; bill my insurance or make self-payment for services provided; and responsibility for payment of non-covered services.     Mode of Communication:  Video Conference via Zoom    As the provider I attest to compliance with applicable laws and regulations related to  telemedicine.          Patient Participation / Response:  Fully participated with the group by sharing personal reflections / insights and openly received / provided feedback with other participants.    Demonstrated understanding of topics discussed through group discussion and participation, Expressed understanding of the relationship between behaviors, thoughts, and feelings and Shared experiences and challenges with making behavioral changes    Treatment Plan:  Patient has a current master individualized treatment plan.  See Epic treatment plan for more information.    Morenita Lau, [facility]

## 2021-02-10 NOTE — PATIENT INSTRUCTIONS
Recommendations from today's MTM visit:                                                       Recommend Vitamin D, B12, and thyroid labs. Orders placed  Recommend checking blood sugars 2 hours after meals    It was great to speak with you today.  I value your experience and would be very thankful for your time with providing feedback on our clinic survey. You may receive a survey via email or text message in the next few days.     Next MTM visit: 10 weeks    To schedule another MTM appointment, please call the clinic directly or you may call the MTM scheduling line at [phone] or toll-free at [phone].     My Clinical Pharmacist's contact information:                                                      It was a pleasure talking with you today!  Please feel free to contact me with any questions or concerns you have.      Radha Mcdonald, Pharm.D, BCACP  Medication Therapy Management Pharmacist

## 2021-02-11 ENCOUNTER — OFFICE VISIT (OUTPATIENT)
Dept: PODIATRY | Facility: CLINIC | Age: 46
End: 2021-02-11
Payer: MEDICARE

## 2021-02-11 ENCOUNTER — PRE VISIT (OUTPATIENT)
Dept: NEUROSURGERY | Facility: CLINIC | Age: 46
End: 2021-02-11

## 2021-02-11 ENCOUNTER — VIRTUAL VISIT (OUTPATIENT)
Dept: NEUROSURGERY | Facility: CLINIC | Age: 46
End: 2021-02-11
Attending: PEDIATRICS
Payer: MEDICARE

## 2021-02-11 VITALS
WEIGHT: 315 LBS | HEART RATE: 88 BPM | DIASTOLIC BLOOD PRESSURE: 80 MMHG | BODY MASS INDEX: 39.75 KG/M2 | SYSTOLIC BLOOD PRESSURE: 140 MMHG

## 2021-02-11 DIAGNOSIS — M50.20 CERVICAL DISC HERNIATION: ICD-10-CM

## 2021-02-11 DIAGNOSIS — M50.30 DEGENERATION OF CERVICAL INTERVERTEBRAL DISC: ICD-10-CM

## 2021-02-11 DIAGNOSIS — M72.2 PLANTAR FASCIITIS: Primary | ICD-10-CM

## 2021-02-11 LAB
DEPRECATED CALCIDIOL+CALCIFEROL SERPL-MC: 38 UG/L (ref 20–75)
TSH SERPL DL<=0.005 MIU/L-ACNC: 2.11 MU/L (ref 0.4–4)
VIT B12 SERPL-MCNC: 731 PG/ML (ref 193–986)

## 2021-02-11 PROCEDURE — 99214 OFFICE O/P EST MOD 30 MIN: CPT | Mod: 95 | Performed by: NEUROLOGICAL SURGERY

## 2021-02-11 PROCEDURE — 99213 OFFICE O/P EST LOW 20 MIN: CPT | Performed by: PODIATRIST

## 2021-02-11 NOTE — PROGRESS NOTES
"  Provider Notes:  This is a video visit conducted due to systemwide and statewide restrictions on non-urgent clinic visits due to COVID-19 pandemic concerns. The patient did not identify any urgent or red-flag symptoms that would require in-person evaluation.        Neurosurgery Clinic Note    Chief Complaint: \"my neck felt funny\"    History of Present Illness:  It was a pleasure to evaluate Joel Pineda in clinic today at the kind referral of Farida Hodgson MD   [facility]  [address] [facility]  [place] [address].    Joel Pineda is a 45 year old male presenting with feeling that his neck was \"more mobile\" and feeling like it was falling forward three different episodes, for this he was given an MRI cervical spine and then referred to me to discuss the results. No recent cervical spine PT or injections, no prior cervical spine surgery.  No transient left arm numbness or weakness, only left arm numbness was when laying down for MRI    Of note I saw him in 2019 for his lumbar spine and did not recommend surgery because symptoms were not correlative with MRI.    He felt like when he was watching TV his neck would just suddenly \"fall forward\", he says he had post-herpetic neuralgia affecting left face (says he has had shingles 4 times), jaw cheek and forehead numb sensation.    He had some left arm numbness while laying flat for MRI but not significant now; no pain in arm, no weakness/numbness          Past Medical History:   Diagnosis Date     Acne      Acquired hypothyroidism      Allergic state      Ankylosing spondylitis lumbar region (H)      Ankylosing spondylitis of sacral region (H)      Anxiety      Bipolar 2 disorder (H)      Chest pain     Chest pain, regulated w/BP meds. Clear arteries.     Chronic pain      DDD (degenerative disc disease), lumbar      Depressive disorder      Diabetes (H)      Diverticulosis      Facet arthritis of cervical region      Gastroesophageal reflux disease  "     Hypertension      IBS (irritable bowel syndrome)      Intracranial arachnoid cyst      Polyneuropathy      Pulmonary embolism (H)      Skin exam, screening for cancer 12/3/2013     Sleep apnea      Uncomplicated asthma          Past Surgical History:   Procedure Laterality Date     BACK SURGERY  10/07    lumbar discectomy L5-S1     COLONOSCOPY      Note: colonoscopy scheduled with P on Friday, 9/4/15     COSMETIC SURGERY  2012    Nose Exterior - functional     GI SURGERY  August 2013    Sigmoidectomy     HERNIA REPAIR, UMBILICAL  8/23/11    small, Dr. Evan Beavers     INCISION AND DRAINAGE, ABSCESS, COMPLEX  8/23/11    umbilical, Dr. Evan Beavers     LAPAROSCOPIC ASSISTED COLECTOMY LEFT (DESCENDING)  8/15/2013    Procedure: LAPAROSCOPIC ASSISTED COLECTOMY LEFT (DESCENDING);  Laparoscopic Hand Assisted Sigmoid Resection, Mobilization of Splenic Fissure, coloproctoscopy, *Latex Free Room* Anesthesia General with Pain block  ;  Surgeon: Aurora Justice MD;  Location: [facility] OR     NERVE SURGERY  8/18/11    RF ablation @ L3-S1 @ [facility]     RECONSTRUCT NOSE AND SEPTUM (FUNCTIONAL)  10/14/2011    Procedure:RECONSTRUCT NOSE AND SEPTUM (FUNCTIONAL); Functional Septorhinoplasty, Turbinate Reduction, ; Surgeon:CEDRIC CUEVAS; Location:[facility] OR     SINUS SURGERY  10/1/01    ethmoidectomy chronic sinusitis         Social History     Socioeconomic History     Marital status: Single     Spouse name: Not on file     Number of children: Not on file     Years of education: Not on file     Highest education level: Not on file   Occupational History     Occupation:      Employer: [employer]     Occupation: paraprofessional     Comment: [employer]     Employer: [employer]   Social Needs     Financial resource strain: Not on file     Food insecurity     Worry: Not on file     Inability: Not on file     Transportation needs     Medical: Not on file     Non-medical: Not on file   Tobacco Use     Smoking status: Never  Smoker     Smokeless tobacco: Never Used   Substance and Sexual Activity     Alcohol use: Not Currently     Alcohol/week: 0.0 standard drinks     Drinks per session: 1 or 2     Binge frequency: Weekly     Comment: occ 1/week     Drug use: No     Sexual activity: Never     Partners: Female     Birth control/protection: None   Lifestyle     Physical activity     Days per week: Not on file     Minutes per session: Not on file     Stress: Not on file   Relationships     Social connections     Talks on phone: Not on file     Gets together: Not on file     Attends Voodoo service: Not on file     Active member of club or organization: Not on file     Attends meetings of clubs or organizations: Not on file     Relationship status: Not on file     Intimate partner violence     Fear of current or ex partner: Not on file     Emotionally abused: Not on file     Physically abused: Not on file     Forced sexual activity: Not on file   Other Topics Concern     Parent/sibling w/ CABG, MI or angioplasty before 65F 55M? No      Service Not Asked     Blood Transfusions Not Asked     Caffeine Concern Not Asked     Occupational Exposure Not Asked     Hobby Hazards Not Asked     Sleep Concern Yes     Stress Concern Yes     Weight Concern Not Asked     Special Diet Not Asked     Back Care Yes     Exercise Not Asked     Bike Helmet Not Asked     Seat Belt Yes     Self-Exams Not Asked   Social History Narrative     Not on file       family history includes Anxiety Disorder in his mother and sister; Breast Cancer in his mother; Colon Polyps in his mother; Depression in his brother, father, mother, paternal grandfather, and sister; Diabetes in his mother; Heart Disease in his maternal grandfather and maternal grandmother; Hyperlipidemia in his father; Hypertension in his father and mother; Musculoskeletal Disorder in his brother, father, and mother; Obesity in his mother; Osteoporosis in his mother; Other Cancer in an other family  member; Psychotic Disorder in his paternal grandfather; Substance Abuse in his brother, brother, father, and sister; Suicide in his paternal grandfather; Thyroid Disease in his mother; Ulcerative Colitis in his mother.        IMAGING per my own measurement and interpretation:  MRI: 1/26/21- no significant central stenosis; left C4-5 chronic and left C6-7 new foraminal stenosis due to disc herniation        Us Jaqueline Doppler With Exercise Bilateral    Result Date: 4/10/2019  PROCEDURE: JAQUELINE with Doppler Waveforms, segmental pressures, and JAQUELINE exercise of the bilateral lower extremities DATE OF PROCEDURE: 4/10/2019 1:31 PM CLINICAL HISTORY/INDICATION: 43-year-old diabetic male with intermittent pain/cramping in his legs during walking. COMPARISON: None relevant TECHNIQUE: Resting and exercise ankle branchial indices and waveform analysis of the bilateral lower extremities FINDINGS: The patient walked on a treadmill for 5 minutes at a 10% incline at 1.5 mph.  The patient had mild left distal calf pain at 45 seconds and bilateral thigh pain at 2 minutes and 15 seconds.. The resting and exercise ABIs on the right are 1.23 and 1.24 respectively The resting and exercise ABIs on the left are 1.26 and 1.26 respectively Resting ABIs on the right 1.23 with multiphasic waveforms within the posterior tibial artery. Monophasic waveforms within the dorsalis pedis. Normal digital waveforms. Resting ABIs on the right 1.26 with multiphasic waveforms in the dorsalis pedis and posterior tibial distribution as well as normal digital waveforms.     IMPRESSION: Normal resting and exercise ABIs bilaterally. Diminished right dorsalis pedis waveforms suggesting anterior tibial stenosis however normal digital waveforms on the right. JAQUELINE Diagnostic Criteria   >/= 1.3          Non compressible  0.95 - 1.29     Normal  0.90 - 0.94     Mild PAD  0.50 - 0.89     Moderate PAD  0.20 - 0.49     Severe PAD  < 0.20             Critical PAD ORLY SONIA HERNANDEZ  MD    Us Lower Extremity Venous Duplex Left    Result Date: 3/26/2019  EXAMINATION: US LOWER EXTREMITY VENOUS DUPLEX LEFT, 3/26/2019 2:01 PM COMPARISON: None. HISTORY: Left calf pain. TECHNIQUE:  Gray-scale evaluation with compression, spectral flow, and color Doppler assessment of the deep venous system of the left leg from groin to knee, and then at the ankle. FINDINGS: In the left lower extremity, the common femoral, superficial femoral, popliteal and posterior tibial veins demonstrate normal compressibility and blood flow. There is normal compressibility, phasicity, and augmentation in the right common femoral vein. In addition, the left peroneal vein was compressible with blood flow and augmentation.     IMPRESSION: 1.  No evidence left lower extremity deep venous thrombosis. OSWALD TONEY MD    Us Renal Complete    Result Date: 10/21/2020  EXAMINATION: US RENAL COMPLETE, 10/21/2020 2:07 PM COMPARISON: 9/2/2016 HISTORY: Microalbuminuria, history of diabetes TECHNIQUE: The kidneys and bladder were scanned in the standard fashion with specialized ultrasound transducer(s) using both gray scale and limited color/spectral Doppler techniques. FINDINGS: Right kidney: Measures 13 cm in length. Parenchyma is of normal thickness and echogenicity. No focal mass. No hydronephrosis. Left kidney: Measures 12.8 cm in length. Parenchyma is of normal thickness and echogenicity. Small left renal cyst at the midpole measures 1.4 x 1.7 x 2.1 cm.. No hydronephrosis. Bladder: Unremarkable. Hepatic steatosis incidentally noted.     IMPRESSION: 1.  No hydronephrosis. 2.  Simple left renal cyst. 3.  Hepatic steatosis. ALEIDA HARDIN MD    Xr Cervical Spine 2/3 Vws    Result Date: 1/26/2021  CERVICAL SPINE TWO TO THREE VIEWS  1/25/2021 3:30 PM HISTORY: Pain of cervical spine. COMPARISON: November 15, 2018     IMPRESSION: Cervical vertebrae are normally aligned. Minimal intervertebral disc space narrowing at C4-C6 levels. No prevertebral  soft tissue swelling. No acute fracture. Overall, unchanged. CAROLYN PITTMAN MD    X-ray Lumbar Spine G/e 4 Views (ap, Lateral, Flexion, Extension - Standing Views Preferred) [[id number]]    Result Date: 9/17/2019  XR LUMBAR SPINE FOUR OR MORE VIEWS   9/17/2019 1:04 PM HISTORY: Evaluate, lumbar radiculopathy.  COMPARISON: None.     IMPRESSION: Minimal anterior osteophytes are seen at the thoracolumbar junction. Posterior alignment is normal. No evidence for fracture. JOSELINE SHEPPARD MD    Xr Sinus Complete G/e 3 Views    Result Date: 7/23/2020  SINUS COMPLETE THREE OR MORE VIEWS  7/23/2020 11:12 AM HISTORY: Acute sinusitis with symptoms greater than 10 days. COMPARISON: None.     IMPRESSION: No high-grade mucosal thickening or air-fluid levels are identified. JANE FARRIS MD    Mri Cervical Spine W/o Contrast    Result Date: 1/26/2021  MRI OF THE CERVICAL SPINE WITHOUT CONTRAST 1/26/2021 3:31 PM HISTORY: Cervical spine pain. TECHNIQUE: Multiplanar, multisequence MRI images of the cervical spine were acquired without contrast. COMPARISON: Cervical spine MRI 10/1/2015. FINDINGS: Normal vertebral body heights, alignment and marrow signal. Normal cord signal. Visualized extraspinal soft tissues are within normal limits. C2-3: Normal disc height.  No herniation.  Normal facets.  No spinal canal or neural foraminal stenosis. C3-4: Normal disc height.  No herniation.  Normal facets.  No spinal canal or neural foraminal stenosis. C4-5: Normal disc height. Moderate left central and foraminal disc osteophyte complex. Normal facets. No spinal canal or right neural foraminal stenosis. Severe left neural foraminal stenosis. C5-6: Normal disc height. Mild circumferential disc osteophyte complex. Mild bilateral facet arthropathy. Mild effacement of the ventral spinal canal. No neural foraminal stenosis. C6-7: Normal disc height. Moderate left central and foraminal disc herniation. Mild bilateral facet arthropathy. No spinal canal or  right neural foraminal stenosis. Severe stenosis of the left foraminal inlet and left neural foramen. C7-T1: Normal disc height.  No herniation.  Normal facets.  No spinal canal or neural foraminal stenosis.     IMPRESSION: 1.  Interval development of a moderately large left central C6-7 disc herniation resulting in severe stenosis of the left foraminal inlet and left neural foramen. 2.  Slightly progressed moderate left central and foraminal C4-5 disc osteophyte complex with severe left neural foraminal stenosis. IVAN CARLISLE MD    Mr Lumbar Spine W/o Contrast    Result Date: 6/27/2019  MR LUMBAR SPINE W/O CONTRAST 6/27/2019 11:44 AM Provided History: Radiculopathy, > 6wks conservative tx, persistent sx; ankylosing spondylitis.  New right foot drop/weakness, intermittent with activity.  H/o epidural steroid injection >2 weeks ago.  No fixed symptoms.; Lumbar radiculopathy ICD-10: Lumbar radiculopathy Comparison: Lumbar spine MRI 12/7/2016 Technique: Sagittal T1-weighted, sagittal STIR, 3D volumetric axial and sagittal reconstructed T2-weighted images of the lumbar spine were obtained without intravenous contrast. Findings: There are 5 lumbar-type vertebrae assumed for the purposes of this dictation.  The tip of the conus medullaris is at L1-L2. Normal lumbar vertebral alignment.  There is multilevel disc height narrowing , most pronounced at L3-S1.  Normal marrow signal. There are degenerative changes the superior endplate of L5. On a level by level basis: T12-L1: No spinal canal or neuroforaminal stenosis. L1-2: No spinal canal or neuroforaminal stenosis. L2-3: No spinal canal or neuroforaminal stenosis. L3-4: Mild broad-based disc bulge with a central annular fissure and superimposed central superiorly migrating disc extrusion. Mild bilateral facet arthropathy. Mild neural foraminal stenosis bilaterally. Mild canal is patent. L4-5: Broad-based disc bulge with superimposed central inferiorly migrating disc  extrusion. Mild bilateral facet arthropathy. Mild bilateral neural foraminal stenosis. Spinal canal is patent. L5-S1: Broad-based disc bulge with superimposed central extrusion inferiorly that abuts the traversing left S1 nerve root without evidence for impingement. Facet arthropathy bilaterally. Moderate to severe left and moderate right neural foraminal stenosis. Spinal canal is patent. Paraspinous tissues are within normal limits.     Impression: 1. Multilevel lumbar spondylosis, most pronounced at L5-S1 with moderate to severe left and moderate right neural foraminal stenosis as well as narrowing of the left lateral recess and abutment the traversing left S1 nerve root. 2. Additional multilevel degenerative changes of the lumbar spine as described above. I have personally reviewed the examination and initial interpretation and I agree with the findings. ORLY MCRAE MD    Ct Head W/o Contrast    Result Date: 5/8/2019  CT HEAD W/O CONTRAST 5/8/2019 1:45 PM Provided History: Disease of inner ear; stabbing right ear pain; Right ear pain ICD-10: Right ear pain Comparison: MR brain 10/1/2015. Technique: Using multidetector thin collimation helical acquisition technique, axial, coronal and sagittal CT images from the skull base to the vertex were obtained without intravenous contrast. Findings:  No intracranial hemorrhage, mass effect, or midline shift. The ventricles are proportionate to the cerebral sulci. The gray to white matter differentiation of the cerebral hemispheres is preserved. The basal cisterns are patent. The visualized paranasal sinuses are clear. The mastoid air cells are clear. No abnormal attenuation in the region of the tympanic cavity, external auditory canal or periauricular soft tissues.      Impression: 1. No acute intracranial pathology. 2. No cause for right ear pain is identified on this examination. If there is further clinical concern, MRI of the skull base could be considered for  "further evaluation. ORLY MCRAE MD    Xr Lumbar Epidural Injection    Result Date: 6/6/2019  This exam was marked as non-reportable because it will not be read by a radiologist or a [place] non-radiologist provider.       Vitamin D:  Vitamin D Deficiency Screening Results:  Lab Results   Component Value Date    VITDT 40 09/13/2019    VITDT 38 01/03/2019    VITDT 30 05/03/2017    VITDT 34 02/08/2016    VITDT 44 01/25/2016     25 [place] Vit D2   Date Value Ref Range Status   09/26/2011 <5 ug/L Final   02/17/2011 <5 ug/L Final   08/28/2010 7 ug/L Final     25 OH Vit D3   Date Value Ref Range Status   09/26/2011 38 ug/L Final   02/17/2011 38 ug/L Final   08/28/2010 37 ug/L Final     25 OH Vit D total   Date Value Ref Range Status   09/26/2011  30 - 75 ug/L Final    <43  Season, race, dietary intake, and treatment affect the concentration of   25-hydroxy-Vitamin D. Values may decrease during winter months and increase   during summer months. Values less than 30 ug/L may indicate Vitamin D   deficiency.   02/17/2011  30 - 75 ug/L Final    <43  Season, race, dietary intake, and treatment affect the concentration of   25-hydroxy-Vitamin D. Values may decrease during winter months and increase   during summer months. Values less than 30 ug/L may indicate Vitamin D   deficiency.   08/28/2010 44 30 - 75 ug/L Final     Comment:     Season, race, dietary intake, and treatment affect the concentration of   25-hydroxy-Vitamin D. Values may decrease during winter months and increase   during summer months. Values less than 30 ug/L may indicate Vitamin D   deficiency.           Nutritional Status:  Estimated body mass index is 39.75 kg/m  as calculated from the following:    Height as of 1/25/21: 1.981 m (6' 6\").    Weight as of 2/10/21: 156 kg (344 lb).      Lab Results   Component Value Date    ALBUMIN 4.3 10/09/2020       Diabetes Screening:  Lab Results   Component Value Date    A1C 6.5 02/10/2021    A1C 6.0 10/16/2020    " A1C 6.1 08/18/2020    A1C 6.0 01/24/2020    A1C 5.9 09/13/2019       Nicotine Usage:    No               Video Physical Exam   There were no vitals taken for this visit.  Constitutional: Oriented to person, place, and time. Appears well-developed and well-nourished. Cooperative. No distress.     Musculoskeletal:   Cervical flexion-extension range of motion: normal, able to touch chin to chest, extension to 20 degrees, lateral rotation to 85 degrees  Neurological: alert and oriented to person, place, and time.   sensory deficit denies            ASSESSMENT:  Joel Pineda is a 45 year old male with cervical spondylosis without  PLAN:    As noted by Dr. Hodgson on 2/1/21, the patient's cervical disc herniations at C4-5 and C6-7 are not the cause of his facial pain or numbness and he would require another provider to address this with him.    I do not recommend any additional treatment of his MRI findings at this time because he has no radicular pain.    If he did eventually develop significant left arm pain or numbness, I would recommend cervical spine PT with traction therapy and if he has significant left arm symptoms despite this then he could have a left C6-7 TFESI to target left C7 nerve root for arm pain relief. However, none of this is necessary now because he doesn't have symptoms correlative to his MRI findings.    He says the fingers on both of his hands will have flapping movements occasionally, none on exam today, I encouraged him to followup with Neurology for this.    No scheduled followup needed with me.      Emily Obando MD    [facility] Department of Neurosurgery  Complex Spinal Deformity, Scoliosis, and Minimally Invasive Spine Surgery Specialist  Office: [phone]    2/11/2021             Statement Selected

## 2021-02-11 NOTE — PATIENT INSTRUCTIONS
Avoid overhead lifting and bending your neck forward for long periods of time to reduce strain on your neck.  Dr. Obando does not recommend any additional treatment for your neck at this time.  No scheduled followup needed with Dr. Obando.

## 2021-02-11 NOTE — LETTER
2/11/2021         RE: Joel Pineda  [address] [facility] [place] [address]        Dear Colleague,    Thank you for referring your patient, Joel Pineda, to the [facility]. Please see a copy of my visit note below.    Subjective:    Patient is seen today as a new pt self referral with a several month(s) hx of bilateral heel pain.  Points to the plantarmedial calcaneal tubercle of calcaneus and also gets pain on the medial band of plantar fascia in his arch.  Aggravated by activity and relieved by rest.  Has history of bilateral subsecond capsulitis.  He has been going to physical therapy and they are doing ultrasound on this and he states it has helped.  He is wearing good shoes that are comfortable.  He has a carbon plate and over-the-counter arch support with a metatarsal pad on here.  Patient has diabetes with peripheral neuropathy.  He has no other new complaints.    ROS: See above       Allergies   Allergen Reactions     Amoxicillin-Pot Clavulanate Difficulty breathing     Banana Shortness Of Breath     Pt reports organic Banana is okay.      Nitroglycerin Palpitations     Penicillins Anaphylaxis     Provigil [Modafinil] Shortness Of Breath     headache     Gadolinium Hives and Itching     Patient was premedicated for the contrast allergy. He did still have a reaction a few hours after injection. Hives and itching. Dr. Gomez told tech to inform pt he should only have contrast again in the future when premedicated and at a hospital. Not at an outpatient facility.      Ketoconazole      Topical cream caused swelling and itching     Dye [Contrast Dye] Other (See Comments) and Hives     Moderate flushing, CT contrast     Golimumab      Hives, bradycardia, face swelling     Neurontin [Gabapentin] Hives     Moderate hives     Nortriptyline Hives     Varicella Virus Vaccine Live      Rash     Flagyl [Metronidazole Hcl] Palpitations and Hives     Latex Rash     Metronidazole  Palpitations, Other (See Comments) and Rash     dizziness (versus ciprofloxacin taken at same time)     No Clinical Screening - See Comments Rash     Nitrile gloves       Current Outpatient Medications   Medication Sig Dispense Refill     acetaminophen 500 MG CAPS Take 1,000 mg by mouth 2 times daily  60 capsule      albuterol (PROAIR HFA/PROVENTIL HFA/VENTOLIN HFA) 108 (90 Base) MCG/ACT inhaler Inhale 2 puffs into the lungs every 4 hours as needed for shortness of breath / dyspnea or wheezing 8.5 g 11     albuterol (PROVENTIL) (2.5 MG/3ML) 0.083% neb solution Take 1 vial (2.5 mg) by nebulization every 6 hours as needed for shortness of breath / dyspnea or wheezing 200 mL 3     aspirin (ASA) 81 MG tablet Take 81 mg by mouth daily        baclofen (LIORESAL) 10 MG tablet Take 0.5-1 tablets (5-10 mg) by mouth 2 times daily 60 tablet 1     cetirizine (ZYRTEC) 10 MG tablet Take 1 tablet (10 mg) by mouth At Bedtime 30 tablet 11     cholecalciferol (VITAMIN D3) 44536 units (1250 mcg) capsule Take one capsule every two weeks. 24 capsule 1     clonazePAM (KLONOPIN) 0.5 MG tablet Take 0.5 mg by mouth At Bedtime        clotrimazole (LOTRIMIN) 1 % external cream Apply daily       cyanocobalamin (CYANOCOBALAMIN) 1000 MCG/ML injection Inject 1 mL (1,000 mcg) into the muscle every 30 days 10 mL 0     DULoxetine (CYMBALTA) 60 MG capsule Take 60 mg by mouth 2 times daily        EPINEPHrine (EPIPEN/ADRENACLICK/OR ANY BX GENERIC EQUIV) 0.3 MG/0.3ML injection 2-pack Inject 0.3 mLs (0.3 mg) into the muscle once as needed for anaphylaxis (Patient not taking: Reported on 2/11/2021) 0.6 mL 3     etanercept (ENBREL SURECLICK) 50 MG/ML autoinjector Inject 50 mg Subcutaneous once a week Hold for signs of infection, and seek medical attention. 4 mL 6     famotidine (PEPCID) 20 MG tablet Prior to administration of Humira every 2 weeks (Patient taking differently: Take 20 mg by mouth every 7 days Prior to Enbrel Injections to prevent skin  reaction)       fluticasone (FLONASE) 50 MCG/ACT nasal spray Spray 1 spray into both nostrils daily       fluticasone-salmeterol (ADVAIR) 500-50 MCG/DOSE inhaler Inhale 1 puff into the lungs every 12 hours 3 each 3     glipiZIDE (GLUCOTROL XL) 5 MG 24 hr tablet Take 1 tablet (5 mg) by mouth daily 90 tablet 1     lamoTRIgine (LAMICTAL) 100 MG tablet Take 200 mg by mouth daily       levothyroxine (SYNTHROID/LEVOTHROID) 75 MCG tablet TAKE 1 TABLET EVERY MORNING 90 tablet 0     lidocaine (XYLOCAINE) 5 % external ointment Apply topically 4 times daily as needed (pain) 50 g 2     melatonin 3 MG tablet Take 9 mg by mouth nightly as needed        metoprolol succinate ER (TOPROL-XL) 200 MG 24 hr tablet Take 1 tablet (200 mg) by mouth 2 times daily 180 tablet 1     montelukast (SINGULAIR) 10 MG tablet Take 1 tablet (10 mg) by mouth every evening 90 tablet 1     nabumetone (RELAFEN) 500 MG tablet Take 1-2 tablets (500-1,000 mg) by mouth 2 times daily (with meals) as needed for back/joint pain. 60 tablet 5     naloxone (NARCAN) 4 MG/0.1ML nasal spray Spray 1 spray (4 mg) into one nostril alternating nostrils as needed for opioid reversal every 2-3 minutes until assistance arrives 0.2 mL 0     omega-3 acid ethyl esters (LOVAZA) 1 g capsule TAKE 2 CAPSULES BY MOUTH TWICE DAILY. 360 capsule 1     omeprazole 20 MG tablet Take 20 mg by mouth daily        ondansetron (ZOFRAN-ODT) 8 MG ODT tab Take 1 tablet (8 mg) by mouth every 8 hours as needed for nausea 30 tablet 3     order for DME Equipment being ordered: Assure Compression stockings (ANNETTA) Large, closed toe, thigh-high 30-40 compression 2 Units 3     order for DME Equipment being ordered: lumbosacral belt/brace 1 Units 0     order for DME Respironics REMSTAR 60 Series Auto CPAP 9-13 cm H2O, Wisp nasal mask w/a large cushion and a chinstrap       oxyCODONE (ROXICODONE) 5 MG tablet Take 1-2 tablets (5-10 mg) by mouth every 6 hours as needed for pain (maximum 4 tablet(s) per day)  35 tablet 0     polyethylene glycol (MIRALAX) 17 g packet Take 1 packet by mouth daily       pregabalin (LYRICA) 150 MG capsule Take 1 capsule (150 mg) by mouth 2 times daily 60 capsule 11     QUEtiapine (SEROQUEL) 25 MG tablet Take 100 mg by mouth At Bedtime        ramipril (ALTACE) 10 MG capsule Take 1 capsule (10 mg) by mouth daily 90 capsule 1     rizatriptan (MAXALT-MLT) 5 MG ODT DISSOLVE 1 TABLET BY MOUTH AT ONSET OF HEADACHE 30 tablet 0     rosuvastatin (CRESTOR) 40 MG tablet Take 1 tablet (40 mg) by mouth daily 90 tablet 0     syringe, disposable, (BD TUBERCULIN SYRINGE) 1 ML MISC Equipment being ordered: 1 ml tuberculin syringes to be used for Vitamin B12 injections. 12 each 11     vitamin B complex with vitamin C (STRESS TAB) tablet Take 1 tablet by mouth daily       ZINC SULFATE-VITAMIN C MT Take 1 tablet by mouth daily         Patient Active Problem List   Diagnosis     Major depressive disorder, recurrent episode (H)     Intermittent asthma     Hyperlipidemia LDL goal <100     Chronic nonallergic rhinitis     Diverticulosis     GERD (gastroesophageal reflux disease)     Anxiety     LLOYD (obstructive sleep apnea)- mild (AHI 11)     Intracranial arachnoid cyst     Facet arthritis of cervical region     Acquired hypothyroidism     Bipolar 2 disorder (H)     Chronic midline low back pain without sciatica     Irritable bowel syndrome with diarrhea     B12 deficiency     Essential hypertension with goal blood pressure less than 140/90     Chronic pain syndrome     Ankylosing spondylitis of sacral region (H)     Morbid obesity due to hypertriglyceridemia (H)     Fatty infiltration of liver     DDD (degenerative disc disease), lumbar     Type 2 diabetes mellitus with complication, without long-term current use of insulin (H)     Peripheral polyneuropathy     History of pulmonary embolism     Ingrown toenail     Hypertriglyceridemia     Gastroparesis     Orthostatic dizziness     POTS (postural orthostatic  tachycardia syndrome)     PHN (postherpetic neuralgia)     Dysautonomia (H)     Major depressive disorder, recurrent episode, severe (H)     Right foot pain       Past Medical History:   Diagnosis Date     Acne      Acquired hypothyroidism      Allergic state      Ankylosing spondylitis lumbar region (H)      Ankylosing spondylitis of sacral region (H)      Anxiety      Bipolar 2 disorder (H)      Chest pain     Chest pain, regulated w/BP meds. Clear arteries.     Chronic pain      DDD (degenerative disc disease), lumbar      Depressive disorder      Diabetes (H)      Diverticulosis      Facet arthritis of cervical region      Gastroesophageal reflux disease      Hypertension      IBS (irritable bowel syndrome)      Intracranial arachnoid cyst      Polyneuropathy      Pulmonary embolism (H)      Skin exam, screening for cancer 12/3/2013     Sleep apnea      Uncomplicated asthma        Past Surgical History:   Procedure Laterality Date     BACK SURGERY  10/07    lumbar discectomy L5-S1     COLONOSCOPY      Note: colonoscopy scheduled with [facility] on Friday, 9/4/15     COSMETIC SURGERY  2012    Nose Exterior - functional     GI SURGERY  August 2013    Sigmoidectomy     HERNIA REPAIR, UMBILICAL  8/23/11    Dr. Evan whiting     INCISION AND DRAINAGE, ABSCESS, COMPLEX  8/23/11    umbilical, Dr. Evan Beavers     LAPAROSCOPIC ASSISTED COLECTOMY LEFT (DESCENDING)  8/15/2013    Procedure: LAPAROSCOPIC ASSISTED COLECTOMY LEFT (DESCENDING);  Laparoscopic Hand Assisted Sigmoid Resection, Mobilization of Splenic Fissure, coloproctoscopy, *Latex Free Room* Anesthesia General with Pain block  ;  Surgeon: Aurora Justice MD;  Location: [facility] OR     NERVE SURGERY  8/18/11    RF ablation @ L3-S1 @ [facility]     RECONSTRUCT NOSE AND SEPTUM (FUNCTIONAL)  10/14/2011    Procedure:RECONSTRUCT NOSE AND SEPTUM (FUNCTIONAL); Functional Septorhinoplasty, Turbinate Reduction, ; Surgeon:CEDRIC CUEVAS; Location:[facility] OR     SINUS SURGERY  10/1/01  "   ethmoidectomy chronic sinusitis       Family History   Problem Relation Age of Onset     Musculoskeletal Disorder Mother         back     Anxiety Disorder Mother      Colon Polyps Mother      Ulcerative Colitis Mother         and ischemic small intestine, surgery     Hypertension Mother      Breast Cancer Mother      Osteoporosis Mother      Diabetes Mother         Type 2, Diagnosed in 2014     Depression Mother         Takes Cymbalta to help with chronic pain + depx     Thyroid Disease Mother         Hypothyroidism     Obesity Mother         Under much better control latter half of 2015     Musculoskeletal Disorder Father         back     Substance Abuse Father      Hypertension Father      Hyperlipidemia Father      Depression Father         Off meds for many years. Seems \"ok\"     Heart Disease Maternal Grandmother      Heart Disease Maternal Grandfather      Psychotic Disorder Paternal Grandfather      Suicide Paternal Grandfather      Depression Paternal Grandfather         Pediatrician. Committed suicide by pistol in 1990.     Musculoskeletal Disorder Brother         back     Depression Brother         Expressed as anger and moodiness     Substance Abuse Brother      Substance Abuse Sister      Depression Sister         Mental Health Therapist, yet no anti-depressants?     Anxiety Disorder Sister         Mental Health Therapist, yet no anti-anxiety meds?     Other Cancer Other         Bladder Cancer - Fatal     Substance Abuse Brother      Colon Cancer No family hx of      Crohn's Disease No family hx of      Anesthesia Reaction No family hx of      Cancer No family hx of         No family history of skin cancer       Social History     Tobacco Use     Smoking status: Never Smoker     Smokeless tobacco: Never Used   Substance Use Topics     Alcohol use: Not Currently     Alcohol/week: 0.0 standard drinks     Drinks per session: 1 or 2     Binge frequency: Weekly     Comment: occ 1/week "         Objective:    Vitals: BP (!) 140/80   Pulse 88   Wt (!) 156 kg (344 lb)   BMI 39.75 kg/m    BMI: Body mass index is 39.75 kg/m .  Height: Data Unavailable    Constitutional/ general:  Pt is in no apparent distress, appears well-nourished.  Cooperative with history and physical exam.     Psych:  The patient answered questions appropriately.  Normal affect.  Seems to have reasonable expectations, in terms of treatment.     Eyes:  Visual scanning/ tracking without deficit.     Ears:  Response to auditory stimuli is normal.  No hearing aid devices.  Auricles in proper alignment.     Lymphatic:  Popliteal lymph nodes not enlarged.     Lungs:  Non labored breathing, non labored speech. No cough.  No audible wheezing. Even, quiet breathing.       Vascular:  Pedal pulses are palpable bilaterally for both the DP and PT arteries.  CFT < 3 sec.  No edema.  Pedal hair growth noted.     Neuro:  Alert and oriented x 3. Coordinated gait.  Light touch sensation is intact to the L4, L5, S1 distributions. No obvious deficits.  No evidence of neurological-based weakness, spasticity, or contracture in the lower extremities.     Derm: Normal texture and turgor.  No erythema, ecchymosis, or cyanosis.  No open lesions.     Musculoskeletal:    Lower extremity muscle strength is normal.  Patient is ambulatory without an assistive device or brace.  No gross deformities.   Cavus arch with weightbearing.   ROM is within normal limits.  Negative tinel's sign.  No side to side compression pain of the calcaneus.  Pain upon palpation to the bilateral plantarmedial  of the calcaneus and medial band of plantar fascia and arch.  The medial band of plantar fascia and both arches are prominent.  No erythema, edema, ecchymosis, or subcutaneous masses noted.  No pain on palpation or stressing any tendons.  Negative Tinel's sign.  No pain with palpation of second met heads today.    Patient wearing nice wide shoe with carbon plates and  over-the-counter orthotics.    Assessment:  Plantar Fasciitis right and left foot                           Cavus foot                           History of subsecond capsulitis    Plan:   Discussed with patient how cavus foot causes plantar fascial be quite prominent.  Explained over-the-counter orthotics may even be irritating this.  Discussed orthotics made with the groove to offload medial band of plantar fascia and arch.  He is in agreement.  We wrote him a prescription for these.  We will also have them put a hole in the heel and a metatarsal pad on these.  Continue physical therapy.  RTC as needed.    Peng Perez DPM DPM, FACFAS        Again, thank you for allowing me to participate in the care of your patient.        Sincerely,        Peng Perez DPM

## 2021-02-11 NOTE — PROGRESS NOTES
Tito is a 45 year old who is being evaluated via a billable video visit.      How would you like to obtain your AVS? MyChart  If the video visit is dropped, the invitation should be resent by: Send to e-mail at: [email or web address]  Will anyone else be joining your video visit? No      Video Start Time: 928am  Video-Visit Details    Type of service:  Video Visit    Video End Time:9:33 AM    Originating Location (pt. Location): Home    Distant Location (provider location):  [facility]     Platform used for Video Visit: Sophia Good, EMT

## 2021-02-11 NOTE — RESULT ENCOUNTER NOTE
Mr. Pineda,    Your A1c has increased which likely means you are over treating your low sugar symptoms.  Try half a sandwich instead of a whole one, etc.  Continue your current medications for now and try to lower you carbs.  Let us know how this goes, as we can decrease your medications if you can do this consistently.    Please contact the clinic if you have additional questions.  Thank you.    Sincerely,    Cindy Webber MD

## 2021-02-11 NOTE — PROGRESS NOTES
Subjective:    Patient is seen today as a new pt self referral with a several month(s) hx of bilateral heel pain.  Points to the plantarmedial calcaneal tubercle of calcaneus and also gets pain on the medial band of plantar fascia in his arch.  Aggravated by activity and relieved by rest.  Has history of bilateral subsecond capsulitis.  He has been going to physical therapy and they are doing ultrasound on this and he states it has helped.  He is wearing good shoes that are comfortable.  He has a carbon plate and over-the-counter arch support with a metatarsal pad on here.  Patient has diabetes with peripheral neuropathy.  He has no other new complaints.    ROS: See above       Allergies   Allergen Reactions     Amoxicillin-Pot Clavulanate Difficulty breathing     Banana Shortness Of Breath     Pt reports organic Banana is okay.      Nitroglycerin Palpitations     Penicillins Anaphylaxis     Provigil [Modafinil] Shortness Of Breath     headache     Gadolinium Hives and Itching     Patient was premedicated for the contrast allergy. He did still have a reaction a few hours after injection. Hives and itching. Dr. Gomez told tech to inform pt he should only have contrast again in the future when premedicated and at a hospital. Not at an outpatient facility.      Ketoconazole      Topical cream caused swelling and itching     Dye [Contrast Dye] Other (See Comments) and Hives     Moderate flushing, CT contrast     Golimumab      Hives, bradycardia, face swelling     Neurontin [Gabapentin] Hives     Moderate hives     Nortriptyline Hives     Varicella Virus Vaccine Live      Rash     Flagyl [Metronidazole Hcl] Palpitations and Hives     Latex Rash     Metronidazole Palpitations, Other (See Comments) and Rash     dizziness (versus ciprofloxacin taken at same time)     No Clinical Screening - See Comments Rash     Nitrile gloves       Current Outpatient Medications   Medication Sig Dispense Refill     acetaminophen  500 MG CAPS Take 1,000 mg by mouth 2 times daily  60 capsule      albuterol (PROAIR HFA/PROVENTIL HFA/VENTOLIN HFA) 108 (90 Base) MCG/ACT inhaler Inhale 2 puffs into the lungs every 4 hours as needed for shortness of breath / dyspnea or wheezing 8.5 g 11     albuterol (PROVENTIL) (2.5 MG/3ML) 0.083% neb solution Take 1 vial (2.5 mg) by nebulization every 6 hours as needed for shortness of breath / dyspnea or wheezing 200 mL 3     aspirin (ASA) 81 MG tablet Take 81 mg by mouth daily        baclofen (LIORESAL) 10 MG tablet Take 0.5-1 tablets (5-10 mg) by mouth 2 times daily 60 tablet 1     cetirizine (ZYRTEC) 10 MG tablet Take 1 tablet (10 mg) by mouth At Bedtime 30 tablet 11     cholecalciferol (VITAMIN D3) 78204 units (1250 mcg) capsule Take one capsule every two weeks. 24 capsule 1     clonazePAM (KLONOPIN) 0.5 MG tablet Take 0.5 mg by mouth At Bedtime        clotrimazole (LOTRIMIN) 1 % external cream Apply daily       cyanocobalamin (CYANOCOBALAMIN) 1000 MCG/ML injection Inject 1 mL (1,000 mcg) into the muscle every 30 days 10 mL 0     DULoxetine (CYMBALTA) 60 MG capsule Take 60 mg by mouth 2 times daily        EPINEPHrine (EPIPEN/ADRENACLICK/OR ANY BX GENERIC EQUIV) 0.3 MG/0.3ML injection 2-pack Inject 0.3 mLs (0.3 mg) into the muscle once as needed for anaphylaxis (Patient not taking: Reported on 2/11/2021) 0.6 mL 3     etanercept (ENBREL SURECLICK) 50 MG/ML autoinjector Inject 50 mg Subcutaneous once a week Hold for signs of infection, and seek medical attention. 4 mL 6     famotidine (PEPCID) 20 MG tablet Prior to administration of Humira every 2 weeks (Patient taking differently: Take 20 mg by mouth every 7 days Prior to Enbrel Injections to prevent skin reaction)       fluticasone (FLONASE) 50 MCG/ACT nasal spray Spray 1 spray into both nostrils daily       fluticasone-salmeterol (ADVAIR) 500-50 MCG/DOSE inhaler Inhale 1 puff into the lungs every 12 hours 3 each 3     glipiZIDE (GLUCOTROL XL) 5 MG 24 hr  tablet Take 1 tablet (5 mg) by mouth daily 90 tablet 1     lamoTRIgine (LAMICTAL) 100 MG tablet Take 200 mg by mouth daily       levothyroxine (SYNTHROID/LEVOTHROID) 75 MCG tablet TAKE 1 TABLET EVERY MORNING 90 tablet 0     lidocaine (XYLOCAINE) 5 % external ointment Apply topically 4 times daily as needed (pain) 50 g 2     melatonin 3 MG tablet Take 9 mg by mouth nightly as needed        metoprolol succinate ER (TOPROL-XL) 200 MG 24 hr tablet Take 1 tablet (200 mg) by mouth 2 times daily 180 tablet 1     montelukast (SINGULAIR) 10 MG tablet Take 1 tablet (10 mg) by mouth every evening 90 tablet 1     nabumetone (RELAFEN) 500 MG tablet Take 1-2 tablets (500-1,000 mg) by mouth 2 times daily (with meals) as needed for back/joint pain. 60 tablet 5     naloxone (NARCAN) 4 MG/0.1ML nasal spray Spray 1 spray (4 mg) into one nostril alternating nostrils as needed for opioid reversal every 2-3 minutes until assistance arrives 0.2 mL 0     omega-3 acid ethyl esters (LOVAZA) 1 g capsule TAKE 2 CAPSULES BY MOUTH TWICE DAILY. 360 capsule 1     omeprazole 20 MG tablet Take 20 mg by mouth daily        ondansetron (ZOFRAN-ODT) 8 MG ODT tab Take 1 tablet (8 mg) by mouth every 8 hours as needed for nausea 30 tablet 3     order for DME Equipment being ordered: Assure Compression stockings (ANNETTA) Large, closed toe, thigh-high 30-40 compression 2 Units 3     order for DME Equipment being ordered: lumbosacral belt/brace 1 Units 0     order for DME Respironics REMSTAR 60 Series Auto CPAP 9-13 cm H2O, Wisp nasal mask w/a large cushion and a chinstrap       oxyCODONE (ROXICODONE) 5 MG tablet Take 1-2 tablets (5-10 mg) by mouth every 6 hours as needed for pain (maximum 4 tablet(s) per day) 35 tablet 0     polyethylene glycol (MIRALAX) 17 g packet Take 1 packet by mouth daily       pregabalin (LYRICA) 150 MG capsule Take 1 capsule (150 mg) by mouth 2 times daily 60 capsule 11     QUEtiapine (SEROQUEL) 25 MG tablet Take 100 mg by mouth At  Bedtime        ramipril (ALTACE) 10 MG capsule Take 1 capsule (10 mg) by mouth daily 90 capsule 1     rizatriptan (MAXALT-MLT) 5 MG ODT DISSOLVE 1 TABLET BY MOUTH AT ONSET OF HEADACHE 30 tablet 0     rosuvastatin (CRESTOR) 40 MG tablet Take 1 tablet (40 mg) by mouth daily 90 tablet 0     syringe, disposable, (BD TUBERCULIN SYRINGE) 1 ML MISC Equipment being ordered: 1 ml tuberculin syringes to be used for Vitamin B12 injections. 12 each 11     vitamin B complex with vitamin C (STRESS TAB) tablet Take 1 tablet by mouth daily       ZINC SULFATE-VITAMIN C MT Take 1 tablet by mouth daily         Patient Active Problem List   Diagnosis     Major depressive disorder, recurrent episode (H)     Intermittent asthma     Hyperlipidemia LDL goal <100     Chronic nonallergic rhinitis     Diverticulosis     GERD (gastroesophageal reflux disease)     Anxiety     LLOYD (obstructive sleep apnea)- mild (AHI 11)     Intracranial arachnoid cyst     Facet arthritis of cervical region     Acquired hypothyroidism     Bipolar 2 disorder (H)     Chronic midline low back pain without sciatica     Irritable bowel syndrome with diarrhea     B12 deficiency     Essential hypertension with goal blood pressure less than 140/90     Chronic pain syndrome     Ankylosing spondylitis of sacral region (H)     Morbid obesity due to hypertriglyceridemia (H)     Fatty infiltration of liver     DDD (degenerative disc disease), lumbar     Type 2 diabetes mellitus with complication, without long-term current use of insulin (H)     Peripheral polyneuropathy     History of pulmonary embolism     Ingrown toenail     Hypertriglyceridemia     Gastroparesis     Orthostatic dizziness     POTS (postural orthostatic tachycardia syndrome)     PHN (postherpetic neuralgia)     Dysautonomia (H)     Major depressive disorder, recurrent episode, severe (H)     Right foot pain       Past Medical History:   Diagnosis Date     Acne      Acquired hypothyroidism      Allergic  state      Ankylosing spondylitis lumbar region (H)      Ankylosing spondylitis of sacral region (H)      Anxiety      Bipolar 2 disorder (H)      Chest pain     Chest pain, regulated w/BP meds. Clear arteries.     Chronic pain      DDD (degenerative disc disease), lumbar      Depressive disorder      Diabetes (H)      Diverticulosis      Facet arthritis of cervical region      Gastroesophageal reflux disease      Hypertension      IBS (irritable bowel syndrome)      Intracranial arachnoid cyst      Polyneuropathy      Pulmonary embolism (H)      Skin exam, screening for cancer 12/3/2013     Sleep apnea      Uncomplicated asthma        Past Surgical History:   Procedure Laterality Date     BACK SURGERY  10/07    lumbar discectomy L5-S1     COLONOSCOPY      Note: colonoscopy scheduled with [facility] on Friday, 9/4/15     COSMETIC SURGERY  2012    Nose Exterior - functional     GI SURGERY  August 2013    Sigmoidectomy     HERNIA REPAIR, UMBILICAL  8/23/11    Dr. Evan whiting     INCISION AND DRAINAGE, ABSCESS, COMPLEX  8/23/11    umbilical, Dr. Evan Beavers     LAPAROSCOPIC ASSISTED COLECTOMY LEFT (DESCENDING)  8/15/2013    Procedure: LAPAROSCOPIC ASSISTED COLECTOMY LEFT (DESCENDING);  Laparoscopic Hand Assisted Sigmoid Resection, Mobilization of Splenic Fissure, coloproctoscopy, *Latex Free Room* Anesthesia General with Pain block  ;  Surgeon: Aurora Justice MD;  Location: [facility] OR     NERVE SURGERY  8/18/11    RF ablation @ L3-S1 @ [facility]     RECONSTRUCT NOSE AND SEPTUM (FUNCTIONAL)  10/14/2011    Procedure:RECONSTRUCT NOSE AND SEPTUM (FUNCTIONAL); Functional Septorhinoplasty, Turbinate Reduction, ; Surgeon:CEDRIC CUEVAS; Location:[facility] OR     SINUS SURGERY  10/1/01    ethmoidectomy chronic sinusitis       Family History   Problem Relation Age of Onset     Musculoskeletal Disorder Mother         back     Anxiety Disorder Mother      Colon Polyps Mother      Ulcerative Colitis Mother         and ischemic small  "intestine, surgery     Hypertension Mother      Breast Cancer Mother      Osteoporosis Mother      Diabetes Mother         Type 2, Diagnosed in 2014     Depression Mother         Takes Cymbalta to help with chronic pain + depx     Thyroid Disease Mother         Hypothyroidism     Obesity Mother         Under much better control latter half of 2015     Musculoskeletal Disorder Father         back     Substance Abuse Father      Hypertension Father      Hyperlipidemia Father      Depression Father         Off meds for many years. Seems \"ok\"     Heart Disease Maternal Grandmother      Heart Disease Maternal Grandfather      Psychotic Disorder Paternal Grandfather      Suicide Paternal Grandfather      Depression Paternal Grandfather         Pediatrician. Committed suicide by pistol in 1990.     Musculoskeletal Disorder Brother         back     Depression Brother         Expressed as anger and moodiness     Substance Abuse Brother      Substance Abuse Sister      Depression Sister         Mental Health Therapist, yet no anti-depressants?     Anxiety Disorder Sister         Mental Health Therapist, yet no anti-anxiety meds?     Other Cancer Other         Bladder Cancer - Fatal     Substance Abuse Brother      Colon Cancer No family hx of      Crohn's Disease No family hx of      Anesthesia Reaction No family hx of      Cancer No family hx of         No family history of skin cancer       Social History     Tobacco Use     Smoking status: Never Smoker     Smokeless tobacco: Never Used   Substance Use Topics     Alcohol use: Not Currently     Alcohol/week: 0.0 standard drinks     Drinks per session: 1 or 2     Binge frequency: Weekly     Comment: occ 1/week         Objective:    Vitals: BP (!) 140/80   Pulse 88   Wt (!) 156 kg (344 lb)   BMI 39.75 kg/m    BMI: Body mass index is 39.75 kg/m .  Height: Data Unavailable    Constitutional/ general:  Pt is in no apparent distress, appears well-nourished.  Cooperative with " history and physical exam.     Psych:  The patient answered questions appropriately.  Normal affect.  Seems to have reasonable expectations, in terms of treatment.     Eyes:  Visual scanning/ tracking without deficit.     Ears:  Response to auditory stimuli is normal.  No hearing aid devices.  Auricles in proper alignment.     Lymphatic:  Popliteal lymph nodes not enlarged.     Lungs:  Non labored breathing, non labored speech. No cough.  No audible wheezing. Even, quiet breathing.       Vascular:  Pedal pulses are palpable bilaterally for both the DP and PT arteries.  CFT < 3 sec.  No edema.  Pedal hair growth noted.     Neuro:  Alert and oriented x 3. Coordinated gait.  Light touch sensation is intact to the L4, L5, S1 distributions. No obvious deficits.  No evidence of neurological-based weakness, spasticity, or contracture in the lower extremities.     Derm: Normal texture and turgor.  No erythema, ecchymosis, or cyanosis.  No open lesions.     Musculoskeletal:    Lower extremity muscle strength is normal.  Patient is ambulatory without an assistive device or brace.  No gross deformities.   Cavus arch with weightbearing.   ROM is within normal limits.  Negative tinel's sign.  No side to side compression pain of the calcaneus.  Pain upon palpation to the bilateral plantarmedial  of the calcaneus and medial band of plantar fascia and arch.  The medial band of plantar fascia and both arches are prominent.  No erythema, edema, ecchymosis, or subcutaneous masses noted.  No pain on palpation or stressing any tendons.  Negative Tinel's sign.  No pain with palpation of second met heads today.    Patient wearing nice wide shoe with carbon plates and over-the-counter orthotics.    Assessment:  Plantar Fasciitis right and left foot                           Cavus foot                           History of subsecond capsulitis    Plan:   Discussed with patient how cavus foot causes plantar fascial be quite prominent.   Explained over-the-counter orthotics may even be irritating this.  Discussed orthotics made with the groove to offload medial band of plantar fascia and arch.  He is in agreement.  We wrote him a prescription for these.  We will also have them put a hole in the heel and a metatarsal pad on these.  Continue physical therapy.  RTC as needed.    Peng Perez, LIAN BEAL, FACFAS

## 2021-02-11 NOTE — LETTER
"2/11/2021       RE: Joel Pineda  [address] [place] [address]     Dear Colleague,    Thank you for referring your patient, Joel Pineda, to the [facility] NEUROSURGERY CLINIC [place] at [facility]. Please see a copy of my visit note below.      Provider Notes:  This is a video visit conducted due to systemwide and statewide restrictions on non-urgent clinic visits due to COVID-19 pandemic concerns. The patient did not identify any urgent or red-flag symptoms that would require in-person evaluation.        Neurosurgery Clinic Note    Chief Complaint: \"my neck felt funny\"    History of Present Illness:  It was a pleasure to evaluate Joel Pineda in clinic today at the kind referral of Farida Hodgson MD   [facility]  [address] [facility] [place] [address].    Joel Pineda is a 45 year old male presenting with feeling that his neck was \"more mobile\" and feeling like it was falling forward three different episodes, for this he was given an MRI cervical spine and then referred to me to discuss the results. No recent cervical spine PT or injections, no prior cervical spine surgery.  No transient left arm numbness or weakness, only left arm numbness was when laying down for MRI    Of note I saw him in 2019 for his lumbar spine and did not recommend surgery because symptoms were not correlative with MRI.    He felt like when he was watching TV his neck would just suddenly \"fall forward\", he says he had post-herpetic neuralgia affecting left face (says he has had shingles 4 times), jaw cheek and forehead numb sensation.    He had some left arm numbness while laying flat for MRI but not significant now; no pain in arm, no weakness/numbness          Past Medical History:   Diagnosis Date     Acne      Acquired hypothyroidism      Allergic state      Ankylosing spondylitis lumbar region (H)      Ankylosing spondylitis of sacral region " (H)      Anxiety      Bipolar 2 disorder (H)      Chest pain     Chest pain, regulated w/BP meds. Clear arteries.     Chronic pain      DDD (degenerative disc disease), lumbar      Depressive disorder      Diabetes (H)      Diverticulosis      Facet arthritis of cervical region      Gastroesophageal reflux disease      Hypertension      IBS (irritable bowel syndrome)      Intracranial arachnoid cyst      Polyneuropathy      Pulmonary embolism (H)      Skin exam, screening for cancer 12/3/2013     Sleep apnea      Uncomplicated asthma          Past Surgical History:   Procedure Laterality Date     BACK SURGERY  10/07    lumbar discectomy L5-S1     COLONOSCOPY      Note: colonoscopy scheduled with [facility] on Friday, 9/4/15     COSMETIC SURGERY  2012    Nose Exterior - functional     GI SURGERY  August 2013    Sigmoidectomy     HERNIA REPAIR, UMBILICAL  8/23/11    small, Dr. Evan Beavers     INCISION AND DRAINAGE, ABSCESS, COMPLEX  8/23/11    umbilical, Dr. Evan Beavers     LAPAROSCOPIC ASSISTED COLECTOMY LEFT (DESCENDING)  8/15/2013    Procedure: LAPAROSCOPIC ASSISTED COLECTOMY LEFT (DESCENDING);  Laparoscopic Hand Assisted Sigmoid Resection, Mobilization of Splenic Fissure, coloproctoscopy, *Latex Free Room* Anesthesia General with Pain block  ;  Surgeon: Aurora Justice MD;  Location: [facility] OR     NERVE SURGERY  8/18/11    RF ablation @ L3-S1 @ [facility]     RECONSTRUCT NOSE AND SEPTUM (FUNCTIONAL)  10/14/2011    Procedure:RECONSTRUCT NOSE AND SEPTUM (FUNCTIONAL); Functional Septorhinoplasty, Turbinate Reduction, ; Surgeon:CEDRIC CUEVAS; Location:[facility] OR     SINUS SURGERY  10/1/01    ethmoidectomy chronic sinusitis         Social History     Socioeconomic History     Marital status: Single     Spouse name: Not on file     Number of children: Not on file     Years of education: Not on file     Highest education level: Not on file   Occupational History     Occupation:      Employer: [employer]     Occupation:  paraprofessional     Comment: [employer]     Employer: [employer]   Social Needs     Financial resource strain: Not on file     Food insecurity     Worry: Not on file     Inability: Not on file     Transportation needs     Medical: Not on file     Non-medical: Not on file   Tobacco Use     Smoking status: Never Smoker     Smokeless tobacco: Never Used   Substance and Sexual Activity     Alcohol use: Not Currently     Alcohol/week: 0.0 standard drinks     Drinks per session: 1 or 2     Binge frequency: Weekly     Comment: occ 1/week     Drug use: No     Sexual activity: Never     Partners: Female     Birth control/protection: None   Lifestyle     Physical activity     Days per week: Not on file     Minutes per session: Not on file     Stress: Not on file   Relationships     Social connections     Talks on phone: Not on file     Gets together: Not on file     Attends [employer] service: Not on file     Active member of club or organization: Not on file     Attends meetings of clubs or organizations: Not on file     Relationship status: Not on file     Intimate partner violence     Fear of current or ex partner: Not on file     Emotionally abused: Not on file     Physically abused: Not on file     Forced sexual activity: Not on file   Other Topics Concern     Parent/sibling w/ CABG, MI or angioplasty before 65F 55M? No      Service Not Asked     Blood Transfusions Not Asked     Caffeine Concern Not Asked     Occupational Exposure Not Asked     Hobby Hazards Not Asked     Sleep Concern Yes     Stress Concern Yes     Weight Concern Not Asked     Special Diet Not Asked     Back Care Yes     Exercise Not Asked     Bike Helmet Not Asked     Seat Belt Yes     Self-Exams Not Asked   Social History Narrative     Not on file       family history includes Anxiety Disorder in his mother and sister; Breast Cancer in his mother; Colon Polyps in his mother; Depression in his brother, father, mother, paternal grandfather,  and sister; Diabetes in his mother; Heart Disease in his maternal grandfather and maternal grandmother; Hyperlipidemia in his father; Hypertension in his father and mother; Musculoskeletal Disorder in his brother, father, and mother; Obesity in his mother; Osteoporosis in his mother; Other Cancer in an other family member; Psychotic Disorder in his paternal grandfather; Substance Abuse in his brother, brother, father, and sister; Suicide in his paternal grandfather; Thyroid Disease in his mother; Ulcerative Colitis in his mother.        IMAGING per my own measurement and interpretation:  MRI: 1/26/21- no significant central stenosis; left C4-5 chronic and left C6-7 new foraminal stenosis due to disc herniation        Us Jaqueline Doppler With Exercise Bilateral    Result Date: 4/10/2019  PROCEDURE: JAQUELINE with Doppler Waveforms, segmental pressures, and JAQUELINE exercise of the bilateral lower extremities DATE OF PROCEDURE: 4/10/2019 1:31 PM CLINICAL HISTORY/INDICATION: 43-year-old diabetic male with intermittent pain/cramping in his legs during walking. COMPARISON: None relevant TECHNIQUE: Resting and exercise ankle branchial indices and waveform analysis of the bilateral lower extremities FINDINGS: The patient walked on a treadmill for 5 minutes at a 10% incline at 1.5 mph.  The patient had mild left distal calf pain at 45 seconds and bilateral thigh pain at 2 minutes and 15 seconds.. The resting and exercise ABIs on the right are 1.23 and 1.24 respectively The resting and exercise ABIs on the left are 1.26 and 1.26 respectively Resting ABIs on the right 1.23 with multiphasic waveforms within the posterior tibial artery. Monophasic waveforms within the dorsalis pedis. Normal digital waveforms. Resting ABIs on the right 1.26 with multiphasic waveforms in the dorsalis pedis and posterior tibial distribution as well as normal digital waveforms.     IMPRESSION: Normal resting and exercise ABIs bilaterally. Diminished right dorsalis  pedis waveforms suggesting anterior tibial stenosis however normal digital waveforms on the right. JAQUELINE Diagnostic Criteria   >/= 1.3          Non compressible  0.95 - 1.29     Normal  0.90 - 0.94     Mild PAD  0.50 - 0.89     Moderate PAD  0.20 - 0.49     Severe PAD  < 0.20             Critical PAD ORLY HERNANDEZ MD    Us Lower Extremity Venous Duplex Left    Result Date: 3/26/2019  EXAMINATION: US LOWER EXTREMITY VENOUS DUPLEX LEFT, 3/26/2019 2:01 PM COMPARISON: None. HISTORY: Left calf pain. TECHNIQUE:  Gray-scale evaluation with compression, spectral flow, and color Doppler assessment of the deep venous system of the left leg from groin to knee, and then at the ankle. FINDINGS: In the left lower extremity, the common femoral, superficial femoral, popliteal and posterior tibial veins demonstrate normal compressibility and blood flow. There is normal compressibility, phasicity, and augmentation in the right common femoral vein. In addition, the left peroneal vein was compressible with blood flow and augmentation.     IMPRESSION: 1.  No evidence left lower extremity deep venous thrombosis. OSWALD TONEY MD    Us Renal Complete    Result Date: 10/21/2020  EXAMINATION: US RENAL COMPLETE, 10/21/2020 2:07 PM COMPARISON: 9/2/2016 HISTORY: Microalbuminuria, history of diabetes TECHNIQUE: The kidneys and bladder were scanned in the standard fashion with specialized ultrasound transducer(s) using both gray scale and limited color/spectral Doppler techniques. FINDINGS: Right kidney: Measures 13 cm in length. Parenchyma is of normal thickness and echogenicity. No focal mass. No hydronephrosis. Left kidney: Measures 12.8 cm in length. Parenchyma is of normal thickness and echogenicity. Small left renal cyst at the midpole measures 1.4 x 1.7 x 2.1 cm.. No hydronephrosis. Bladder: Unremarkable. Hepatic steatosis incidentally noted.     IMPRESSION: 1.  No hydronephrosis. 2.  Simple left renal cyst. 3.  Hepatic steatosis. ALEIDA  MD LASHAWN    Xr Cervical Spine 2/3 Vws    Result Date: 1/26/2021  CERVICAL SPINE TWO TO THREE VIEWS  1/25/2021 3:30 PM HISTORY: Pain of cervical spine. COMPARISON: November 15, 2018     IMPRESSION: Cervical vertebrae are normally aligned. Minimal intervertebral disc space narrowing at C4-C6 levels. No prevertebral soft tissue swelling. No acute fracture. Overall, unchanged. CAROLYN PITTMAN MD    X-ray Lumbar Spine G/e 4 Views (ap, Lateral, Flexion, Extension - Standing Views Preferred) [[id number]]    Result Date: 9/17/2019  XR LUMBAR SPINE FOUR OR MORE VIEWS   9/17/2019 1:04 PM HISTORY: Evaluate, lumbar radiculopathy.  COMPARISON: None.     IMPRESSION: Minimal anterior osteophytes are seen at the thoracolumbar junction. Posterior alignment is normal. No evidence for fracture. JOSELINE SHEPPARD MD    Xr Sinus Complete G/e 3 Views    Result Date: 7/23/2020  SINUS COMPLETE THREE OR MORE VIEWS  7/23/2020 11:12 AM HISTORY: Acute sinusitis with symptoms greater than 10 days. COMPARISON: None.     IMPRESSION: No high-grade mucosal thickening or air-fluid levels are identified. JANE FARRIS MD    Mri Cervical Spine W/o Contrast    Result Date: 1/26/2021  MRI OF THE CERVICAL SPINE WITHOUT CONTRAST 1/26/2021 3:31 PM HISTORY: Cervical spine pain. TECHNIQUE: Multiplanar, multisequence MRI images of the cervical spine were acquired without contrast. COMPARISON: Cervical spine MRI 10/1/2015. FINDINGS: Normal vertebral body heights, alignment and marrow signal. Normal cord signal. Visualized extraspinal soft tissues are within normal limits. C2-3: Normal disc height.  No herniation.  Normal facets.  No spinal canal or neural foraminal stenosis. C3-4: Normal disc height.  No herniation.  Normal facets.  No spinal canal or neural foraminal stenosis. C4-5: Normal disc height. Moderate left central and foraminal disc osteophyte complex. Normal facets. No spinal canal or right neural foraminal stenosis. Severe left neural foraminal  stenosis. C5-6: Normal disc height. Mild circumferential disc osteophyte complex. Mild bilateral facet arthropathy. Mild effacement of the ventral spinal canal. No neural foraminal stenosis. C6-7: Normal disc height. Moderate left central and foraminal disc herniation. Mild bilateral facet arthropathy. No spinal canal or right neural foraminal stenosis. Severe stenosis of the left foraminal inlet and left neural foramen. C7-T1: Normal disc height.  No herniation.  Normal facets.  No spinal canal or neural foraminal stenosis.     IMPRESSION: 1.  Interval development of a moderately large left central C6-7 disc herniation resulting in severe stenosis of the left foraminal inlet and left neural foramen. 2.  Slightly progressed moderate left central and foraminal C4-5 disc osteophyte complex with severe left neural foraminal stenosis. IVAN CARLISLE MD    Mr Lumbar Spine W/o Contrast    Result Date: 6/27/2019  MR LUMBAR SPINE W/O CONTRAST 6/27/2019 11:44 AM Provided History: Radiculopathy, > 6wks conservative tx, persistent sx; ankylosing spondylitis.  New right foot drop/weakness, intermittent with activity.  H/o epidural steroid injection >2 weeks ago.  No fixed symptoms.; Lumbar radiculopathy ICD-10: Lumbar radiculopathy Comparison: Lumbar spine MRI 12/7/2016 Technique: Sagittal T1-weighted, sagittal STIR, 3D volumetric axial and sagittal reconstructed T2-weighted images of the lumbar spine were obtained without intravenous contrast. Findings: There are 5 lumbar-type vertebrae assumed for the purposes of this dictation.  The tip of the conus medullaris is at L1-L2. Normal lumbar vertebral alignment.  There is multilevel disc height narrowing , most pronounced at L3-S1.  Normal marrow signal. There are degenerative changes the superior endplate of L5. On a level by level basis: T12-L1: No spinal canal or neuroforaminal stenosis. L1-2: No spinal canal or neuroforaminal stenosis. L2-3: No spinal canal or neuroforaminal  stenosis. L3-4: Mild broad-based disc bulge with a central annular fissure and superimposed central superiorly migrating disc extrusion. Mild bilateral facet arthropathy. Mild neural foraminal stenosis bilaterally. Mild canal is patent. L4-5: Broad-based disc bulge with superimposed central inferiorly migrating disc extrusion. Mild bilateral facet arthropathy. Mild bilateral neural foraminal stenosis. Spinal canal is patent. L5-S1: Broad-based disc bulge with superimposed central extrusion inferiorly that abuts the traversing left S1 nerve root without evidence for impingement. Facet arthropathy bilaterally. Moderate to severe left and moderate right neural foraminal stenosis. Spinal canal is patent. Paraspinous tissues are within normal limits.     Impression: 1. Multilevel lumbar spondylosis, most pronounced at L5-S1 with moderate to severe left and moderate right neural foraminal stenosis as well as narrowing of the left lateral recess and abutment the traversing left S1 nerve root. 2. Additional multilevel degenerative changes of the lumbar spine as described above. I have personally reviewed the examination and initial interpretation and I agree with the findings. ORLY MCRAE MD    Ct Head W/o Contrast    Result Date: 5/8/2019  CT HEAD W/O CONTRAST 5/8/2019 1:45 PM Provided History: Disease of inner ear; stabbing right ear pain; Right ear pain ICD-10: Right ear pain Comparison: MR brain 10/1/2015. Technique: Using multidetector thin collimation helical acquisition technique, axial, coronal and sagittal CT images from the skull base to the vertex were obtained without intravenous contrast. Findings:  No intracranial hemorrhage, mass effect, or midline shift. The ventricles are proportionate to the cerebral sulci. The gray to white matter differentiation of the cerebral hemispheres is preserved. The basal cisterns are patent. The visualized paranasal sinuses are clear. The mastoid air cells are clear. No  abnormal attenuation in the region of the tympanic cavity, external auditory canal or periauricular soft tissues.      Impression: 1. No acute intracranial pathology. 2. No cause for right ear pain is identified on this examination. If there is further clinical concern, MRI of the skull base could be considered for further evaluation. ORLY MCRAE MD    Xr Lumbar Epidural Injection    Result Date: 6/6/2019  This exam was marked as non-reportable because it will not be read by a radiologist or a [facility] non-radiologist provider.       Vitamin D:  Vitamin D Deficiency Screening Results:  Lab Results   Component Value Date    VITDT 40 09/13/2019    VITDT 38 01/03/2019    VITDT 30 05/03/2017    VITDT 34 02/08/2016    VITDT 44 01/25/2016     25 OH Vit D2   Date Value Ref Range Status   09/26/2011 <5 ug/L Final   02/17/2011 <5 ug/L Final   08/28/2010 7 ug/L Final     25 OH Vit D3   Date Value Ref Range Status   09/26/2011 38 ug/L Final   02/17/2011 38 ug/L Final   08/28/2010 37 ug/L Final     25 OH Vit D total   Date Value Ref Range Status   09/26/2011  30 - 75 ug/L Final    <43  Season, race, dietary intake, and treatment affect the concentration of   25-hydroxy-Vitamin D. Values may decrease during winter months and increase   during summer months. Values less than 30 ug/L may indicate Vitamin D   deficiency.   02/17/2011  30 - 75 ug/L Final    <43  Season, race, dietary intake, and treatment affect the concentration of   25-hydroxy-Vitamin D. Values may decrease during winter months and increase   during summer months. Values less than 30 ug/L may indicate Vitamin D   deficiency.   08/28/2010 44 30 - 75 ug/L Final     Comment:     Season, race, dietary intake, and treatment affect the concentration of   25-hydroxy-Vitamin D. Values may decrease during winter months and increase   during summer months. Values less than 30 ug/L may indicate Vitamin D   deficiency.           Nutritional Status:  Estimated body mass  "index is 39.75 kg/m  as calculated from the following:    Height as of 1/25/21: 1.981 m (6' 6\").    Weight as of 2/10/21: 156 kg (344 lb).      Lab Results   Component Value Date    ALBUMIN 4.3 10/09/2020       Diabetes Screening:  Lab Results   Component Value Date    A1C 6.5 02/10/2021    A1C 6.0 10/16/2020    A1C 6.1 08/18/2020    A1C 6.0 01/24/2020    A1C 5.9 09/13/2019       Nicotine Usage:    No               Video Physical Exam   There were no vitals taken for this visit.  Constitutional: Oriented to person, place, and time. Appears well-developed and well-nourished. Cooperative. No distress.     Musculoskeletal:   Cervical flexion-extension range of motion: normal, able to touch chin to chest, extension to 20 degrees, lateral rotation to 85 degrees  Neurological: alert and oriented to person, place, and time.   sensory deficit denies            ASSESSMENT:  Joel Pineda is a 45 year old male with cervical spondylosis without  PLAN:    As noted by Dr. Hodgson on 2/1/21, the patient's cervical disc herniations at C4-5 and C6-7 are not the cause of his facial pain or numbness and he would require another provider to address this with him.    I do not recommend any additional treatment of his MRI findings at this time because he has no radicular pain.    If he did eventually develop significant left arm pain or numbness, I would recommend cervical spine PT with traction therapy and if he has significant left arm symptoms despite this then he could have a left C6-7 TFESI to target left C7 nerve root for arm pain relief. However, none of this is necessary now because he doesn't have symptoms correlative to his MRI findings.    He says the fingers on both of his hands will have flapping movements occasionally, none on exam today, I encouraged him to followup with Neurology for this.    No scheduled followup needed with me.      Emily Obando MD    [facility] Department of " Neurosurgery  Complex Spinal Deformity, Scoliosis, and Minimally Invasive Spine Surgery Specialist  Office: [phone]    2/11/2021              Tito is a 45 year old who is being evaluated via a billable video visit.      How would you like to obtain your AVS? MyChart  If the video visit is dropped, the invitation should be resent by: Send to e-mail at: [email or web address]  Will anyone else be joining your video visit? No      Video Start Time: 928am  Video-Visit Details    Type of service:  Video Visit    Video End Time:9:33 AM    Originating Location (pt. Location): Home    Distant Location (provider location):  [facility] NEUROSURGERY [facility]     Platform used for Video Visit: Sophia Good, EMT        Again, thank you for allowing me to participate in the care of your patient.      Sincerely,    Emily Obando MD

## 2021-02-11 NOTE — PATIENT INSTRUCTIONS
We wish you continued good healing. If you have any questions or concerns, please do not hesitate to contact us at [phone]    [employer] (secure e-mail communication and access to your chart) to send a message or to make an appointment.    Please remember to call and schedule a follow up appointment if one was recommended at your earliest convenience.     +++OF MARCH 2020+++ LOCATION AND HOURS HAVE CHANGED    PLEASE CALL CLINICS TO VERIFY DAYS AND TIMES  PODIATRY CLINIC HOURS  TELEPHONE NUMBER    Dr. Peng UMANAPFRANCISCO [facility]        Clinics:  [facility]   Tuesday 1PM-6PM  [place]  Wednesday 745AM-330PM  [place]  Thursday/Friday 745AM-230PM  [place] [facility] APPOINTMENTS  [phone]    [place] APPOINTMENTS  [phone]          If you need a medication refill, please contact us you may need lab work and/or a follow up visit prior to your refill (i.e. Antifungal medications).    If MRI needed please call Imaging at [phone] or [phone]    HOW DO I GET MY KNEE SCOOTER? Knee scooters can be picked up at ANY Medical Supply stores with your knee scooter Prescription.  OR    Bring your signed prescription to an [facility] Medical Equipment showroom.

## 2021-02-12 ENCOUNTER — HOSPITAL ENCOUNTER (OUTPATIENT)
Dept: BEHAVIORAL HEALTH | Facility: CLINIC | Age: 46
End: 2021-02-12
Attending: PSYCHIATRY & NEUROLOGY
Payer: MEDICARE

## 2021-02-12 PROCEDURE — 90853 GROUP PSYCHOTHERAPY: CPT | Mod: PO | Performed by: COUNSELOR

## 2021-02-12 NOTE — GROUP NOTE
Psychotherapy Group Note    PATIENT'S NAME: Joel Pineda  MRN:   [id number]  :   1975  ACCT. NUMBER: [id number]  DATE OF SERVICE: 21  START TIME: 10:00 AM  END TIME: 10:50 AM  FACILITATOR: Morenita Lau LPCC  TOPIC: MH EBP Group: Coping Skills  [facility] Mental Health Day Treatment  TRACK: 6A    NUMBER OF PARTICIPANTS: 5    Summary of Group / Topics Discussed:  Coping Skills: Radical Acceptance: Patients learned radical acceptance as a way to tolerate heightened stress in the moment.  Patients identified situations that necessitate radical acceptance.  They focused on applying acceptance of the moment to tolerate difficult emotions and events. Patients discussed how to distinguish when this can be useful in their lives and when other skills are more relevant or helpful.    Patient Session Goals / Objectives:    Understand that some amount of pain exists for each of us in our lives    Process the difficulty of acceptance in our lives and benefits of radical acceptance to mood and functioning.    Demonstrate understanding of when to use the radical acceptance to tolerate distress by providing examples of situations where this could be applied.    Identify barriers to applying radical acceptance to difficult situations and explore strategies to overcome them    Telemedicine Visit: The patient's condition can be safely assessed and treated via synchronous audio and visual telemedicine encounter.      Reason for Telemedicine Visit: Services only offered telehealth and due to COVID-19    Originating Site (Patient Location): Patient's home    Distant Site (Provider Location): Provider Remote Setting    Consent:  The patient/guardian has verbally consented to: the potential risks and benefits of telemedicine (video visit) versus in person care; bill my insurance or make self-payment for services provided; and responsibility for payment of non-covered services.     Mode of Communication:   Video Conference via Zoom    As the provider I attest to compliance with applicable laws and regulations related to telemedicine.          Patient Participation / Response:  Fully participated with the group by sharing personal reflections / insights and openly received / provided feedback with other participants.    Demonstrated understanding of topics discussed through group discussion and participation, Expressed understanding of the relevance / importance of coping skills at distressing times in life and Demonstrated knowledge of when to consider using a variety of coping skills in daily life    Treatment Plan:  Patient has a current master individualized treatment plan.  See Epic treatment plan for more information.    Morenita Lau, [facility]

## 2021-02-12 NOTE — GROUP NOTE
Psychotherapy Group Note    PATIENT'S NAME: Joel Pineda  MRN:   [id number]  :   1975  ACCT. NUMBER: [id number]  DATE OF SERVICE: 21  START TIME: 11:00 AM  END TIME: 11:50 AM  FACILITATOR: Morenita Lau LPCC  TOPIC: MH EBP Group: Specialty Awareness  [facility] Adult Mental Health Day Treatment  TRACK: 6A    NUMBER OF PARTICIPANTS: 5    Summary of Group / Topics Discussed:  Specialty Topics: Autobiography: This topic provides each patient with an opportunity to share his/her life story by including background information, significant events that have been life changing, and a means to talk about how mental health and substance abuse has impacted overall functioning and development.      Patient Session Goals / Objectives:    Patient  had opportunity his/her personal story and give some background on their past and/or listen to another patient share their personal story    Identified ways that mental illness and substance use has impacted functioning and development    Examined their lives with and without the impact of substances /mental illness    Telemedicine Visit: The patient's condition can be safely assessed and treated via synchronous audio and visual telemedicine encounter.      Reason for Telemedicine Visit: Services only offered telehealth and due to COVID-19    Originating Site (Patient Location): Patient's home    Distant Site (Provider Location): Provider Remote Setting    Consent:  The patient/guardian has verbally consented to: the potential risks and benefits of telemedicine (video visit) versus in person care; bill my insurance or make self-payment for services provided; and responsibility for payment of non-covered services.     Mode of Communication:  Video Conference via Zoom    As the provider I attest to compliance with applicable laws and regulations related to telemedicine.        Patient Participation / Response:  Fully participated with the group by sharing  personal reflections / insights and openly received / provided feedback with other participants.    Demonstrated understanding of topics discussed through group discussion and participation, Identified / Expressed readiness to act on skill suggestions discussed in topic and Verbalized understanding of ways to proactively manage illness    Treatment Plan:  Patient has a current master individualized treatment plan.  See Epic treatment plan for more information.    Morenita Lau, LPCC

## 2021-02-12 NOTE — GROUP NOTE
"Process Group Note    PATIENT'S NAME: Joel Pineda  MRN:   [id number]  :   1975  ACCT. NUMBER: [id number]  DATE OF SERVICE: 21  START TIME:  9:00 AM  END TIME:  9:50 AM  FACILITATOR: Lenore French [facility]  TOPIC:  Process Group    Diagnoses:  296.33 (F33.2) Major Depressive Disorder, Recurrent Episode, Severe _.  4. Other Diagnoses that is relevant to services:   300.00 (F41.9) Unspecified Anxiety Disorder  301.83 (F60.3) Borderline Personality Disorder.      [facility] Mental Health Day Treatment  TRACK: 6A    NUMBER OF PARTICIPANTS: 6    Telemedicine Visit: The patient's condition can be safely assessed and treated via synchronous audio and visual telemedicine encounter.      Reason for Telemedicine Visit: Services only offered telehealth    Originating Site (Patient Location): Patient's home    Distant Site (Provider Location): Provider Remote Setting    Consent:  The patient/guardian has verbally consented to: the potential risks and benefits of telemedicine (video visit) versus in person care; bill my insurance or make self-payment for services provided; and responsibility for payment of non-covered services.     Mode of Communication:  Video Conference via [employer]    As the provider I attest to compliance with applicable laws and regulations related to telemedicine.            Data:    Session content: At the start of this group, patients were invited to check in by identifying themselves, describing their current emotional status, and identifying issues to address in this group.   Area(s) of treatment focus addressed in this session included Symptom Management and Personal Safety.    Tito reported feeling \"content\" today.  His goal is to go to the store to get some food for the rabbit he has been seeing outside.      Therapeutic Interventions/Treatment Strategies:  Psychotherapist offered support, feedback and validation.    Assessment:    Patient response:   Patient " responded to session by accepting feedback, giving feedback and listening    Possible barriers to participation / learning include: and no barriers identified    Health Issues:   Yes: Pain, Associated Psychological Distress       Substance Use Review:   Substance Use: No active concerns identified.    Mental Status/Behavioral Observations  Appearance:   Appropriate   Eye Contact:   Good   Psychomotor Behavior: Normal   Attitude:   Cooperative   Orientation:   All  Speech   Rate / Production: Normal    Volume:  Normal   Mood:    Depressed   Affect:    Appropriate   Thought Content:   Clear  Thought Form:  Coherent  Logical     Insight:    Good     Plan:     Safety Plan: No current safety concerns identified.  Recommended that patient call 911 or go to the local ED should there be a change in any of these risk factors.     Barriers to treatment: None identified    Patient Contracts (see media tab):  None    Substance Use: Not addressed in session     Continue or Discharge: Patient will continue in Adult Day Treatment (ADT)  as planned. Patient is likely to benefit from learning and using skills as they work toward the goals identified in their treatment plan.      Lenore French, [facility]  February 12, 2021

## 2021-02-15 ENCOUNTER — MYC MEDICAL ADVICE (OUTPATIENT)
Dept: PALLIATIVE MEDICINE | Facility: CLINIC | Age: 46
End: 2021-02-15

## 2021-02-15 ENCOUNTER — VIRTUAL VISIT (OUTPATIENT)
Dept: PALLIATIVE MEDICINE | Facility: CLINIC | Age: 46
End: 2021-02-15
Payer: MEDICARE

## 2021-02-15 DIAGNOSIS — E08.42 DIABETIC POLYNEUROPATHY ASSOCIATED WITH DIABETES MELLITUS DUE TO UNDERLYING CONDITION (H): Primary | ICD-10-CM

## 2021-02-15 DIAGNOSIS — M51.369 DDD (DEGENERATIVE DISC DISEASE), LUMBAR: ICD-10-CM

## 2021-02-15 DIAGNOSIS — M45.8 ANKYLOSING SPONDYLITIS OF SACRAL REGION (H): ICD-10-CM

## 2021-02-15 DIAGNOSIS — B02.29 POSTHERPETIC NEURALGIA: ICD-10-CM

## 2021-02-15 DIAGNOSIS — M62.838 MUSCLE SPASM: ICD-10-CM

## 2021-02-15 PROCEDURE — 99213 OFFICE O/P EST LOW 20 MIN: CPT | Mod: 95 | Performed by: NURSE PRACTITIONER

## 2021-02-15 RX ORDER — BACLOFEN 10 MG/1
5-10 TABLET ORAL 2 TIMES DAILY
Qty: 60 TABLET | Refills: 1 | Status: SHIPPED | OUTPATIENT
Start: 2021-02-15 | End: 2021-06-30

## 2021-02-15 ASSESSMENT — PAIN SCALES - GENERAL: PAINLEVEL: MODERATE PAIN (4)

## 2021-02-15 NOTE — PROGRESS NOTES
"  [facility]      Joel Pineda is a 45 year old male who is being evaluated via a billable virtual visit.      VIDEO VISIT   How would you like to obtain your AVS? MyChart  If you are dropped from the video visit, the video invite should be resent to: Text to cell phone: cell  Will anyone else be joining your video visit? No  If patient encounters technical issues they should call [phone]    Video-Visit Details  Type of service:  Video Visit  Video Start Time: 3:02 PM  Video End Time: 3:22 PM  Originating Location (pt. Location): Home  Distant Location (provider location):  [facility]   Platform used for Video Visit: LionJose Carlos    CHIEF COMPLAINT: Chronic pain    INTERVAL HISTORY:  Last seen on 1/14/21.        Recommendations/plan at the last visit included:  1. Schedule physical therapy assessment with Gurwinder rodriguez [place]  2. Schedule VIDEO follow-up with JOCELYN Zavala NP-C in 4 weeks PRIOR to any refills that will be due at that time.   3. Medication recommendations:             1. Baclofen 10 mg 1/2-1 tab two times daily    Since last visit:   - States PHN flare is on bilateral sides of his face. Uses lidocaine which he states provides adequate pain control. Also uses medical cannabis on the back of his head but the smell is too strong to use on his face. Feels \"tight\" but does not burn. Using TENS unit regularly for other pain issues.  Was going to PT with [facility] in [place] for bilateral pain, capsulitis. Saw podiatry and was given referral for orthotics but they are too expensive ($400). Wondering about referral for pain psychology.   - Has pain PT evaluation with Gurwinder Trevino. Currently working with [facility] for neck pain   - Baclofen is helpful. Taking 5 mg and 10 mg at .     Pain Information:              Pain quality: Burning and searing, overwhelming               Pain rating: Pain rating: intensity ranges from 2/10 to 6/10, and averages 4/10 on a " 0-10 scale.    Annual Requirements last collected:  N/A     Current Pain Relevant Medications:    Oxycodone 5 mg PRN chronic pain, occasional for PHN  Lyrica 100 mg 3 times daily  Relafen 500 mg, 1 to 2 tablets daily  Lidocaine, Capsaicin topically 2-3 times daily  Xanax 0.5 mg in afternoon, 1 mg at HS     Previous Pain Relevant Medications: (H--helped; HI--Helped initially; SWH--Somewhat helpful; NH--No help; W--worse; SE--side effects; ?--Unsure if helpful)   NOTE: This medication information taken from patient's intake form, not medical records.  This will be added at a later date if patient decides to enter comprehensive pain management program     Any illicit drug use: denies   EtOH use: none   Caffeine use: 6-8 per day   Nicotine use: None     THE 4 As OF OPIOID MAINTENANCE ANALGESIA    Analgesia: Is pain relief clinically significant? YES   Activity: Is patient functional and able to perform Activities of Daily Living? YES   Adverse effects: Is patient free from adverse side effects from opiates? YES   Adherence to Rx protocol: Is patient adhering to Controlled Substance Agreement and taking medications ONLY as ordered? YES     Is Narcan prescribed for opiate use >50 MME daily or concurrent use of opiates and benzodiazepines?  Yes prescribed by this writer 8/10/2020     [place] Board of Pharmacy Data Base Reviewed:    YES; No concern for abuse or misuse of controlled medications based on this report.      Past Pain Treatments:               Pain Clinic:   Yes    [facility] pain management: For spinal injections with Dr Diaz, [facility]: injections, nerve ablation, [facility] for SCS treatment.  Did trial but did not find it beneficial.   [facility]ly [facility] chronic pain program.                   PT: Yes  For other reasons. Neck, back and feet              Psychologist: Yes ongoing with private therapist.at  [facility].               Chiropractor: Yes years ago              Acupuncture: Yes [facility]              Pharmacotherapy:                            Opioids: Yes                            Non-opioids:    Yes               TENs Unit:Yes H for back               Injections: Yes Many for neck and back               Surgeries related to pain: Yes               October 2007 L5-S1 laminectomy, micro discectomy    _______________________________________________      Is Narcan prescribed for opiate use >50 MME daily or concurrent use of opiates and benzodiazepines? N/A    [place] Board of Pharmacy Data Base Reviewed:    YES; As expected, no concern for misuse/abuse of controlled medications based on this report. Reviewed [facility] February 15, 2021- no concerning fills.    _______________________________________________      Physical Exam unable to be completed due to virtual visit. There were no vitals taken for this visit or reported by patient.     Review of Systems: A 10-point review of systems was negative, with the exception of any concerns as noted and chronic pain issues.      General: Verbal, alert and no distress   Psychiatric:  affect and mood normal, mentation appears normal    DIRE Score for ongoing opioid management is calculated as follows:    Diagnosis = 3 pts (advanced condition; severe pain/objective findings)    Intractability = 3 pts (patient fully engaged but inadequate response to treatments)    Risk        Psych = 3 pts (no significant personality dysfunction/mental illness; good communication with clinic)         Chem Hlth = 3 pts (no history of chemical dependency; not drug-focused)       Reliability = 2 pts (occasional difficulties with compliance; generally reliable)       Social = 2 pts (reduction in some relationships/life rolls)       (Psych + Chem hlth + Reliability + Social) = 16    Efficacy = 2 pts (moderate benefit/function; low med dose; too early/not tried meds)    DIRE Score = 18        7-13: likely NOT suitable candidate for long-term opioid analgesia       14-21: may be a suitable candidate for long-term  opioid analgesia     Previous Diagnostic Tests:   Imaging Studies:   MR LUMBAR SPINE W/O CONTRAST 6/27/2019  Comparison: Lumbar spine MRI 12/7/2016  Findings: There are 5 lumbar-type vertebrae assumed for the purposes of this dictation.  The tip of the conus medullaris is at L1-L2.  Normal lumbar vertebral alignment.  There is multilevel disc height narrowing , most pronounced at L3-S1.  Normal marrow signal. There are degenerative changes the superior endplate of L5.  On a level by level basis:  T12-L1: No spinal canal or neuroforaminal stenosis.  L1-2: No spinal canal or neuroforaminal stenosis.  L2-3: No spinal canal or neuroforaminal stenosis.  L3-4: Mild broad-based disc bulge with a central annular fissure and superimposed central superiorly migrating disc extrusion. Mild bilateral facet arthropathy. Mild neural foraminal stenosis  bilaterally. Mild canal is patent.  L4-5: Broad-based disc bulge with superimposed central inferiorly migrating disc extrusion. Mild bilateral facet arthropathy. Mild bilateral neural foraminal stenosis. Spinal canal is patent.  L5-S1: Broad-based disc bulge with superimposed central extrusion inferiorly that abuts the traversing left S1 nerve root without evidence for impingement. Facet arthropathy bilaterally. Moderate to severe left and moderate right neural foraminal stenosis. Spinal canal is patent. Paraspinous tissues are within normal limits.  Impression:   1. Multilevel lumbar spondylosis, most pronounced at L5-S1 with  moderate to severe left and moderate right neural foraminal stenosis as well as narrowing of the left lateral recess and abutment the traversing left S1 nerve root.   2. Additional multilevel degenerative changes of the lumbar spine as described above.     ASSESSMENT:   1.  Postherpetic neuralgia  2.  Ankylosing spondylosis, Lumbar DDD, with nerve involvement   3.  Chronic migraine headaches  4.  History: Complex medical issues, see history    Tito presents  for follow-up and medication management appointment.  He found the baclofen was helpful and we will refill that.  He has an appointment to start with physical therapy through the pain center which I think will be beneficial for him.  He has good mental health care in place and is in group and individual therapy so we will hold on pain PT at this time.  Follow-up in 1 month or sooner as needed.        Plan:    Diagnosis reviewed, treatment option addressed, and risk/benifits discussed.  Self-care instructions given.  I am recommending a multidisciplinary treatment plan to help this patient better manage pain.      1. Schedule pain psychology assessment/visits as recommended by your health psychologist  2. Schedule physical therapy assessment/visits as recommended by your physical therapist  3. Schedule VIDEO  follow-up with JOCELYN Zavala NP-C in 1 month PRIOR to any refills that will be due at that time.   4. Medication recommendations:   1.  No change to current medications.  Baclofen refilled.    I have reviewed the note as documented above.  This accurately captures the substance of my conversation with the patient.    BILLING TIME DOCUMENTATION:   TOTAL TIME includes:   Time spent preparing to see the patient: 2 minutes (reviewing records and tests)  Time spend face to face with the patient: 22 minutes  Time spent ordering tests, medications, procedures and referrals: 0 minutes  Time spent Referring and communicating with other healthcare professionals: 0 minutes  Documenting clinical information in Epic: 0 minutes    The total TIME spent on this patient on the day of the appointment was 23 minutes.     JOCELYN Padgett, NP-C  [facility]

## 2021-02-15 NOTE — PROGRESS NOTES
Tito is a 45 year old who is being evaluated via a billable video visit.      How would you like to obtain your AVS? Ozy Mediahart  If the video visit is dropped, the invitation should be resent by: Other e-mail: Kal  Will anyone else be joining your video visit? Virginia Coleman MA  [facility] [place]

## 2021-02-16 ENCOUNTER — HOSPITAL ENCOUNTER (OUTPATIENT)
Dept: BEHAVIORAL HEALTH | Facility: CLINIC | Age: 46
End: 2021-02-16
Attending: PSYCHIATRY & NEUROLOGY
Payer: MEDICARE

## 2021-02-16 PROCEDURE — 90853 GROUP PSYCHOTHERAPY: CPT | Mod: PO | Performed by: COUNSELOR

## 2021-02-16 PROCEDURE — 90853 GROUP PSYCHOTHERAPY: CPT | Mod: GT | Performed by: COUNSELOR

## 2021-02-16 NOTE — GROUP NOTE
Psychotherapy Group Note    PATIENT'S NAME: Joel Pineda  MRN:   [id number]  :   1975  ACCT. NUMBER: [id number]  DATE OF SERVICE: 21  START TIME: 11:00 AM  END TIME: 11:50 AM  FACILITATOR: Morenita Lau LPCC  TOPIC: MH EBP Group: Coping Skills  [facility] Adult Mental Health Day Treatment  TRACK: 6A    NUMBER OF PARTICIPANTS: 6    Summary of Group / Topics Discussed:  Coping Skills: Meditation: Patients learned about meditation and explored how and when to utilize it to increase focus, reduce mental health symptoms, decrease physical tension, and improve mental well-being.  Approaches to meditation were presented as a means of increasing self-awareness. The benefits of various meditation practices were discussed, as well as barriers to utilization of this coping strategy.     Patient Session Goals / Objectives:    Understand the purpose and efficacy of using meditation modalities to reduce stress / symptoms.    Review / discuss situations in daily life that cause distress, where establishing a meditation routine or meditating as needed may improve functioning.      Verbalize understanding of how and when to apply grounding strategies to reduce distress and increase presence in the moment.    Practice meditation and address barriers to use in daily life.    Telemedicine Visit: The patient's condition can be safely assessed and treated via synchronous audio and visual telemedicine encounter.      Reason for Telemedicine Visit: Services only offered telehealth and due to COVID-19    Originating Site (Patient Location): Patient's home    Distant Site (Provider Location): Provider Remote Setting    Consent:  The patient/guardian has verbally consented to: the potential risks and benefits of telemedicine (video visit) versus in person care; bill my insurance or make self-payment for services provided; and responsibility for payment of non-covered services.     Mode of Communication:  Video  Conference via Zoom    As the provider I attest to compliance with applicable laws and regulations related to telemedicine.          Patient Participation / Response:  Fully participated with the group by sharing personal reflections / insights and openly received / provided feedback with other participants.    Demonstrated understanding of topics discussed through group discussion and participation, Expressed understanding of the relevance / importance of coping skills at distressing times in life and Demonstrated knowledge of when to consider using a variety of coping skills in daily life    Treatment Plan:  Patient has a current master individualized treatment plan.  See Epic treatment plan for more information.    Morenita Lau, [facility]

## 2021-02-16 NOTE — GROUP NOTE
"Process Group Note    PATIENT'S NAME: Joel Pineda  MRN:   [id number]  :   1975  ACCT. NUMBER: [id number]  DATE OF SERVICE: 21  START TIME:  9:00 AM  END TIME:  9:50 AM  FACILITATOR: Lenore French [facility]  TOPIC:  Process Group    Diagnoses:  296.33 (F33.2) Major Depressive Disorder, Recurrent Episode, Severe _.  4. Other Diagnoses that is relevant to services:   300.00 (F41.9) Unspecified Anxiety Disorder  301.83 (F60.3) Borderline Personality Disorder.      [facility] Mental Health Day Treatment  TRACK: 6A    NUMBER OF PARTICIPANTS: 8    Telemedicine Visit: The patient's condition can be safely assessed and treated via synchronous audio and visual telemedicine encounter.      Reason for Telemedicine Visit: Services only offered telehealth    Originating Site (Patient Location): Patient's home    Distant Site (Provider Location): Provider Remote Setting    Consent:  The patient/guardian has verbally consented to: the potential risks and benefits of telemedicine (video visit) versus in person care; bill my insurance or make self-payment for services provided; and responsibility for payment of non-covered services.     Mode of Communication:  Video Conference via [address]    As the provider I attest to compliance with applicable laws and regulations related to telemedicine.            Data:    Session content: At the start of this group, patients were invited to check in by identifying themselves, describing their current emotional status, and identifying issues to address in this group.   Area(s) of treatment focus addressed in this session included Symptom Management and Personal Safety.    Tito reported feeling \"liberated\" because he decided to end up ending things with his therapist.  His goal for the day is to try some new techniques for pain management.  Client declined additional process time but contributed to group discussion and provided feedback and support to " peers.      Therapeutic Interventions/Treatment Strategies:  Psychotherapist offered support, feedback and validation and reinforced use of skills.    Assessment:    Patient response:   Patient responded to session by accepting feedback, giving feedback and listening    Possible barriers to participation / learning include: and no barriers identified    Health Issues:   Yes: Pain, Associated Psychological Distress       Substance Use Review:   Substance Use: No active concerns identified.    Mental Status/Behavioral Observations  Appearance:   Appropriate   Eye Contact:   Good   Psychomotor Behavior: Normal   Attitude:   Cooperative   Orientation:   All  Speech   Rate / Production: Normal    Volume:  Normal   Mood:    Depressed   Affect:    Appropriate   Thought Content:   Safety reports  presence of suicidal ideation passive suicidal ideation   Thought Form:  Coherent  Logical     Insight:    Good     Plan:     Safety Plan: No current safety concerns identified.  Recommended that patient call 911 or go to the local ED should there be a change in any of these risk factors.     Barriers to treatment: None identified    Patient Contracts (see media tab):  None    Substance Use: Not addressed in session     Continue or Discharge: Patient will continue in Adult Day Treatment (ADT)  as planned. Patient is likely to benefit from learning and using skills as they work toward the goals identified in their treatment plan.      Lenore French, [facility]  February 16, 2021

## 2021-02-16 NOTE — GROUP NOTE
Psychotherapy Group Note    PATIENT'S NAME: Joel Pineda  MRN:   [id number]  :   1975  ACCT. NUMBER: [id number]  DATE OF SERVICE: 21  START TIME: 10:00 AM  END TIME: 10:50 AM  FACILITATOR: Lenore French LP[facility]  TOPIC: MH EBP Group: Specialty [facility] Adult Mental Health Day Treatment  TRACK: 6A    NUMBER OF PARTICIPANTS: 7    Telemedicine Visit: The patient's condition can be safely assessed and treated via synchronous audio and visual telemedicine encounter.      Reason for Telemedicine Visit: Services only offered telehealth    Originating Site (Patient Location): Patient's home    Distant Site (Provider Location): Provider Remote Setting    Consent:  The patient/guardian has verbally consented to: the potential risks and benefits of telemedicine (video visit) versus in person care; bill my insurance or make self-payment for services provided; and responsibility for payment of non-covered services.     Mode of Communication:  Video Conference via [employer]    As the provider I attest to compliance with applicable laws and regulations related to telemedicine.      Summary of Group / Topics Discussed:  Specialty Topics: Life Transitions: The topic of life transitions was presented in order to help patients to better understand the challenges presented by life transitions, and how to best navigate them. Exploring the phases of transition and how one works through them was discussed. Patients were provided with information regarding community resources.     Patient Session Goals / Objectives:    Discussed the timing and nature of major life transitions    Explored how life transitions may impact mental health and functioning    Discussed coping strategies to manage symptoms and help with transitioning    Discussed and planned a successful transition        Patient Participation / Response:  Fully participated with the group by sharing personal reflections / insights and openly  received / provided feedback with other participants.    Demonstrated understanding of topics discussed through group discussion and participation, Identified / Expressed readiness to act on skill suggestions discussed in topic and Verbalized understanding of ways to proactively manage illness    Treatment Plan:  Patient has a current master individualized treatment plan.  See Epic treatment plan for more information.    Len[facility] French, [facility]

## 2021-02-17 ENCOUNTER — HOSPITAL ENCOUNTER (OUTPATIENT)
Dept: BEHAVIORAL HEALTH | Facility: CLINIC | Age: 46
End: 2021-02-17
Attending: PSYCHIATRY & NEUROLOGY
Payer: MEDICARE

## 2021-02-17 PROCEDURE — 90853 GROUP PSYCHOTHERAPY: CPT | Mod: GT | Performed by: COUNSELOR

## 2021-02-17 PROCEDURE — 90853 GROUP PSYCHOTHERAPY: CPT | Mod: PO | Performed by: COUNSELOR

## 2021-02-17 NOTE — GROUP NOTE
"Process Group Note    PATIENT'S NAME: Joel Pineda  MRN:   [id number]  :   1975  ACCT. NUMBER: [id number]  DATE OF SERVICE: 21  START TIME: 10:00 AM  END TIME: 10:50 AM  FACILITATOR: Lenore French [facility]  TOPIC:  Process Group    Diagnoses:  296.33 (F33.2) Major Depressive Disorder, Recurrent Episode, Severe _.  4. Other Diagnoses that is relevant to services:   300.00 (F41.9) Unspecified Anxiety Disorder  301.83 (F60.3) Borderline Personality Disorder.      [facility] Mental Health Day Treatment  TRACK: 6A    NUMBER OF PARTICIPANTS: 5    Telemedicine Visit: The patient's condition can be safely assessed and treated via synchronous audio and visual telemedicine encounter.      Reason for Telemedicine Visit: Services only offered telehealth    Originating Site (Patient Location): Patient's home    Distant Site (Provider Location): Provider Remote Setting    Consent:  The patient/guardian has verbally consented to: the potential risks and benefits of telemedicine (video visit) versus in person care; bill my insurance or make self-payment for services provided; and responsibility for payment of non-covered services.     Mode of Communication:  Video Conference via [employer]    As the provider I attest to compliance with applicable laws and regulations related to telemedicine.            Data:    Session content: At the start of this group, patients were invited to check in by identifying themselves, describing their current emotional status, and identifying issues to address in this group.   Area(s) of treatment focus addressed in this session included Symptom Management and Personal Safety.    Tito reported feeling \"bummed out\" and \"remorseful\" today because his mental and physical health has been interfering with his relationship with his 10 year-old niece.  His goal for the day is to go through his mail.  Client declined additional process time but contributed to group discussion " and provided feedback and support to peers.      Therapeutic Interventions/Treatment Strategies:  Psychotherapist offered support, feedback and validation.    Assessment:    Patient response:   Patient responded to session by accepting feedback, giving feedback and listening    Possible barriers to participation / learning include: and no barriers identified    Health Issues:   None reported       Substance Use Review:   Substance Use: No active concerns identified.    Mental Status/Behavioral Observations  Appearance:   Appropriate   Eye Contact:   Good   Psychomotor Behavior: Normal   Attitude:   Cooperative   Orientation:   All  Speech   Rate / Production: Normal    Volume:  Normal   Mood:    Depressed   Affect:    Appropriate   Thought Content:   Clear  Thought Form:  Coherent  Logical     Insight:    Good     Plan:     Safety Plan: No current safety concerns identified.  Recommended that patient call 911 or go to the local ED should there be a change in any of these risk factors.     Barriers to treatment: None identified    Patient Contracts (see media tab):  None    Substance Use: Not addressed in session     Continue or Discharge: Patient will continue in Adult Day Treatment (ADT)  as planned. Patient is likely to benefit from learning and using skills as they work toward the goals identified in their treatment plan.      Lenore French, [facility]  February 17, 2021

## 2021-02-17 NOTE — GROUP NOTE
Psychoeducation Group Note    PATIENT'S NAME: Joel Pineda  MRN:   [id number]  :   1975  ACCT. NUMBER: [id number]  DATE OF SERVICE: 21  START TIME: 11:00 AM  END TIME: 11:50 AM  FACILITATOR: Morenita Lau LPCC  TOPIC: VERN RN Group: Mental Health Maintenance  [facility] Adult Mental Health Day Treatment  TRACK: 6A    NUMBER OF PARTICIPANTS: 5    Summary of Group / Topics Discussed:  Mental Health Maintenance:  Stigma: In this group patients explored stigma surrounding a mental health diagnosis.  The group discussed the way stigma impacts their own life, and discussed strategies to reduce. The relationship between physical and mental health were also explored in the context of healthcare access, treatment, and support.    Patient Session Goals / Objectives:  ? Patients identified the importance of practicing emotional hygiene  ? Patients identified ways to decrease the  impact of stigma in their own life    Telemedicine Visit: The patient's condition can be safely assessed and treated via synchronous audio and visual telemedicine encounter.      Reason for Telemedicine Visit: Services only offered telehealth and due to COVID-19    Originating Site (Patient Location): Patient's home    Distant Site (Provider Location): Provider Remote Setting    Consent:  The patient/guardian has verbally consented to: the potential risks and benefits of telemedicine (video visit) versus in person care; bill my insurance or make self-payment for services provided; and responsibility for payment of non-covered services.     Mode of Communication:  Video Conference via [employer] [facility]    As the provider I attest to compliance with applicable laws and regulations related to telemedicine.        Patient Participation / Response:  Fully participated with the group by sharing personal reflections / insights and openly received / provided feedback with other participants.    Demonstrated understanding of topics discussed  through group discussion and participation, Identified / Expressed personal readiness to practice skills and Verbalized understanding of mental health maintenance topic    Treatment Plan:  Patient has a current master individualized treatment plan.  See Epic treatment plan for more information.    Morenita Lau, [facility]

## 2021-02-19 ENCOUNTER — HOSPITAL ENCOUNTER (OUTPATIENT)
Dept: BEHAVIORAL HEALTH | Facility: CLINIC | Age: 46
End: 2021-02-19
Attending: PSYCHIATRY & NEUROLOGY
Payer: MEDICARE

## 2021-02-19 PROCEDURE — 90853 GROUP PSYCHOTHERAPY: CPT | Mod: PO | Performed by: COUNSELOR

## 2021-02-19 NOTE — GROUP NOTE
Psychoeducation Group Note    PATIENT'S NAME: Joel Pineda  MRN:   [id number]  :   1975  ACCT. NUMBER: [id number]  DATE OF SERVICE: 21  START TIME: 11:00 AM  END TIME: 11:50 AM  FACILITATOR: Morenita Lau LP[facility]  TOPIC: MH RN Group: Health Maintenance  [facility] Adult Mental Health Day Treatment  TRACK: 6A    NUMBER OF PARTICIPANTS: 6    Summary of Group / Topics Discussed:  Health Maintenance: Weekend planning: Patients were given time to complete a weekend plan of what they will do to promote wellness and sobriety over the weekend when they do not have the structure of group. Patients were encouraged to review progress on their treatment goals and were challenged to identify ways to work toward meeting them. Patients identified and discussed foreseeable barriers to success over the weekend and then developed a plan to overcome them. Patients reviewed their distress coping skills and social support network and discussed this with the group.       Patient Session Goals / Objectives:    ?    Identified activities to engage in that promote balance in wellness  ?    Distinguished possible barriers to success over the weekend and created a plan to overcome them  ?    Listed distress coping skills and identified social support network to utilize if in crisis during the weekend      Telemedicine Visit: The patient's condition can be safely assessed and treated via synchronous audio and visual telemedicine encounter.      Reason for Telemedicine Visit: Services only offered telehealth and due to COVID-19    Originating Site (Patient Location): Patient's home    Distant Site (Provider Location): Provider Remote Setting    Consent:  The patient/guardian has verbally consented to: the potential risks and benefits of telemedicine (video visit) versus in person care; bill my insurance or make self-payment for services provided; and responsibility for payment of non-covered services.     Mode of  Communication:  Video Conference via Zoom    As the provider I attest to compliance with applicable laws and regulations related to telemedicine.        Patient Participation / Response:  Fully participated with the group by sharing personal reflections / insights and openly received / provided feedback with other participants.    Demonstrated understanding of topics discussed through group discussion and participation, Identified / Expressed personal readiness to practice skills and Verbalized understanding of health maintenance topic    Treatment Plan:  Patient has a current master individualized treatment plan.  See Epic treatment plan for more information.    Morenita Lau, [facility]

## 2021-02-19 NOTE — GROUP NOTE
"Process Group Note    PATIENT'S NAME: Joel Pineda  MRN:   [id number]  :   1975  ACCT. NUMBER: [id number]  DATE OF SERVICE: 21  START TIME:  9:00 AM  END TIME:  9:50 AM  FACILITATOR: Lenore French [facility]  TOPIC:  Process Group    Diagnoses:  296.33 (F33.2) Major Depressive Disorder, Recurrent Episode, Severe _.  4. Other Diagnoses that is relevant to services:   300.00 (F41.9) Unspecified Anxiety Disorder  301.83 (F60.3) Borderline Personality Disorder.  2    [facility] Day Treatment  TRACK: 6A    NUMBER OF PARTICIPANTS: 6    Telemedicine Visit: The patient's condition can be safely assessed and treated via synchronous audio and visual telemedicine encounter.      Reason for Telemedicine Visit: Services only offered telehealth    Originating Site (Patient Location): Patient's home    Distant Site (Provider Location): Provider Remote Setting    Consent:  The patient/guardian has verbally consented to: the potential risks and benefits of telemedicine (video visit) versus in person care; bill my insurance or make self-payment for services provided; and responsibility for payment of non-covered services.     Mode of Communication:  Video Conference via [employer]    As the provider I attest to compliance with applicable laws and regulations related to telemedicine.            Data:    Session content: At the start of this group, patients were invited to check in by identifying themselves, describing their current emotional status, and identifying issues to address in this group.   Area(s) of treatment focus addressed in this session included Symptom Management and Personal Safety.    Tito reported feeling \"frustrated\" today with trying to get a hold of his old therapist.  His goal for the day is to attend his physical therapy.      Therapeutic Interventions/Treatment Strategies:  Psychotherapist offered support, feedback and validation.    Assessment:    Patient response: "   Patient responded to session by accepting feedback, giving feedback and listening    Possible barriers to participation / learning include: and no barriers identified    Health Issues:   Yes: Pain, Associated Psychological Distress       Substance Use Review:   Substance Use: No active concerns identified.    Mental Status/Behavioral Observations  Appearance:   Appropriate   Eye Contact:   Good   Psychomotor Behavior: Normal   Attitude:   Cooperative   Orientation:   All  Speech   Rate / Production: Normal    Volume:  Normal   Mood:    Depressed   Affect:    Appropriate   Thought Content:   Clear  Thought Form:  Coherent  Logical     Insight:    Good     Plan:     Safety Plan: No current safety concerns identified.  Recommended that patient call 911 or go to the local ED should there be a change in any of these risk factors.     Barriers to treatment: None identified    Patient Contracts (see media tab):  None    Substance Use: Not addressed in session     Continue or Discharge: Patient will continue in Adult Day Treatment (ADT)  as planned. Patient is likely to benefit from learning and using skills as they work toward the goals identified in their treatment plan.      Lenore French, [facility]  February 19, 2021

## 2021-02-19 NOTE — GROUP NOTE
Psychotherapy Group Note    PATIENT'S NAME: Joel Pineda  MRN:   [id number]  :   1975  ACCT. NUMBER: [id number]  DATE OF SERVICE: 21  START TIME: 10:00 AM  END TIME: 10:50 AM  FACILITATOR: Morenita Lau LP[facility]  TOPIC: MH EBP Group: Specialty Awareness  [facility] Adult Mental Health Day Treatment  TRACK: 6A    NUMBER OF PARTICIPANTS: 6    Summary of Group / Topics Discussed:  Specialty Topics: Autobiography: This topic provides each patient with an opportunity to share his/her life story by including background information, significant events that have been life changing, and a means to talk about how mental health and substance abuse has impacted overall functioning and development.      Patient Session Goals / Objectives:    Patient  had opportunity his/her personal story and give some background on their past and/or listen to another patient share their personal story    Identified ways that mental illness and substance use has impacted functioning and development    Examined their lives with and without the impact of substances /mental illness    Telemedicine Visit: The patient's condition can be safely assessed and treated via synchronous audio and visual telemedicine encounter.      Reason for Telemedicine Visit: Services only offered telehealth and due to COVID-19    Originating Site (Patient Location): Patient's home    Distant Site (Provider Location): Provider Remote Setting    Consent:  The patient/guardian has verbally consented to: the potential risks and benefits of telemedicine (video visit) versus in person care; bill my insurance or make self-payment for services provided; and responsibility for payment of non-covered services.     Mode of Communication:  Video Conference via Zoom      As the provider I attest to compliance with applicable laws and regulations related to telemedicine.        Patient Participation / Response:  Fully participated with the group by sharing  personal reflections / insights and openly received / provided feedback with other participants.    Demonstrated understanding of topics discussed through group discussion and participation, Identified / Expressed readiness to act on skill suggestions discussed in topic and Verbalized understanding of ways to proactively manage illness    Treatment Plan:  Patient has a current master individualized treatment plan.  See Epic treatment plan for more information.    Morenita Lau, LP[facility]

## 2021-02-22 ENCOUNTER — VIRTUAL VISIT (OUTPATIENT)
Dept: PALLIATIVE MEDICINE | Facility: CLINIC | Age: 46
End: 2021-02-22
Payer: MEDICARE

## 2021-02-22 DIAGNOSIS — M48.061 SPINAL STENOSIS OF LUMBAR REGION WITHOUT NEUROGENIC CLAUDICATION: ICD-10-CM

## 2021-02-22 DIAGNOSIS — M45.8 ANKYLOSING SPONDYLITIS OF SACRAL REGION (H): Primary | ICD-10-CM

## 2021-02-22 PROCEDURE — 97161 PT EVAL LOW COMPLEX 20 MIN: CPT | Mod: GP | Performed by: PHYSICAL THERAPIST

## 2021-02-22 PROCEDURE — 97112 NEUROMUSCULAR REEDUCATION: CPT | Mod: GP | Performed by: PHYSICAL THERAPIST

## 2021-02-22 NOTE — LETTER
"DEPARTMENT OF HEALTH AND HUMAN SERVICES  CENTERS FOR MEDICARE & MEDICAID SERVICES    PLAN/UPDATED PLAN OF PROGRESS FOR OUTPATIENT REHABILITATION          PATIENTS NAME:  Joel Pineda     : 1975    PROVIDER NUMBER:    [id number]    [id number]     PROVIDER NAME: [facility]    MEDICAL RECORD NUMBER: [id number]     START OF CARE DATE:  SOC Date: 21   TYPE:  PT    PRIMARY/TREATMENT DIAGNOSIS: (Pertinent Medical Diagnosis)     Ankylosing spondylitis of sacral region (H)  Spinal stenosis of lumbar region without neurogenic claudication    VISITS FROM START OF CARE:  Rxs Used: 1     PHYSICAL THERAPY INITIAL EVALUATION and PLAN OF CARE    Patient Name:  Joel Pineda  \"Tito)  :   MRN:    SUBJECTIVE:  PRESENTATION AND ETIOLOGY  Chief Complaint: {RW PT L/R/BOTH:213542} *** {RW PT Initial Chief Complaint:377535} limiting the ability to perform {RW PT LEVEL OF FUCNTION:995900}.      Onset / Etiology: {RW PT SYMPTOMS:018731}    Pattern Since Onset: {RW PT PATTERN:016151}    Pertinent Medical  / Surgical History: {RW PT NON-CONTRIBUTORY:693144}    Symptom Pattern: {RW PT SYMPTOM PATTERN:908449}    Pain:  Frequency: {RW PT PAIN FREQUENCY:097677}           Intensity: {RW PT PAIN INTENSITY:460457}    LEVEL OF FUNCTION AT START OF CARE  Current Aggrevating Activities / Functional Limitations: ***    Prior Functional Level: {RW PT WNL-LIMITED:742059::\"No functional limitations prior to onset of chief complaint.\"}     Home or Community Barriers: {NONE/OTHER:866789::\"None\"}    Patient's selected goals for physical therapy: ***    CURRENT / PREVIOUS INTERVENTION(S):     Relieving Activities / Self Care / Exercise: {RW PT SELF CARE:020533}    Previous / Current therapies for current chief complaint: {RW PT OTHER THERAPY:924473}    DEMOGRAPHICS  Employment Status: {RW PT EMPLOYMENT STATUS:400241}    Social Support: {RW PT SOCIAL SUPPORT:197702}    Patient's percieved quality of life:  {RW PT " "EVAL GOOD/FAIR/POOR:402984}    ===============================================================  OBJECTIVE:  POSTURE:  Observation: ***      GAIT, LOCOMOTION, and BALANCE:  Gait and Locomotion: {RW PT GAIT:503324::\"Non-Antalgic\"}      RANGE OF MOTION:   Active: {RW PT WNL:219017}    MUSCLE PERFORMANCE:   Strength: {RW PT WNL:716784}  Flexibility: {RW PT WNL:747380}    PALPATION: ***    OTHER: ***    Pain behaviors: {NONESTARS:560405::\"None\"}      ===============================================================  Today's Treatment:  Initial evaluation  {PAIN PT TREATMENT INTERVENTIONS:940819}  ===============================================================  ASSESSMENT:  Physical Therapy Diagnosis:{RW PT DIAGNOSIS:739446}    Patient requires PT intervention for the following impairments: {RW PT EVAL PROBLEM LIST:625812}    Anticipated Goals and Expected Outcomes:{RW PT ANTICIPATED GOALS:840566}    Rehab potential for achieving goals: {RW PT EVAL GOOD/FAIR/POOR:610205}.    ===============================================================  PLAN:   Patient will benefit from skilled physical therapy consisting of: {RW PT INITIAL PLAN:463682}    Assessment will be ongoing with changes in treatment as indicated.  Benefits/risks/alternatives to treatment have been reviewed and the patient has been instructed to contact this office if there are any questions or concerns.  This plan of care has been discussed with the patient and the patient is in agreement.     Frequency / Duration:  Patient will be seen for a total of *** visits; at a frequency of ***.    Total Visit Time: {NUMBERS 15-60:163277}  minutes            Evan Trevino          PT                                Date:  ***/2010    =====================================================  **  Referring Provider Certification: Referring Provider reviewing certifies that the above treatment / plan of care is required and authorized, and that the patient's plan will be reviewed " "every thirty (30) days **.   ======================================================     PRESENT: {RW PT YES/NO/NA:863472::\"NA\"}    MULTIDISCIPLINARY PATIENT / FAMILY EDUCATION RECORD  Department:  Physical Therapy    Readiness to Learn: {READINESS TO LEARN:922944::\"Ability to understand verbal instructions,Ability to understand written instructions,Knowledge of educational needs / treatment plan\"}  Specific Barriers to Learning: {SPECIFIC BARRIERS TO LEARNIN::\"None\"}  Referrals: {REFERRALS:237740::\"None\"}  Learning Needs: {LEARNING NEEDS:445376::\"Rehabilitation techniques to improve functional independence\"}  Who: {WHO:255196::\"Patient\"}  How: {HOW:774813::\"Demonstration,Verbal instructions,Written instructions\"}  Response: {RESPONSE:157562::\"Appropriate verbal response,Asked questions,Demonstrated ability,Verbalized recall / understanding\"}        PHYSICAL THERAPY INITIAL EVALUATION and PLAN OF CARE    Patient Name:  Joel Pineda  (\"Tito)  : 1975  MRN:[id number]    The patient has been notified of the following:  \"This virtual visit will be conducted between you and your provider.  We have found that certain health care needs can be provided without the need for physical presence.  This service lets us provide the care you need with a virtual visit.\"    Due to external, as well as internal [facility] management of the COVID 19 Virus, Joel Pineda, was not seen in our clinic.  As a substitution, we implemented a virtual visit to manage this patient's condition utilizing the [employer] virtual visit platform.  The provider, Evan Trevino PT, reviewed the patient's chart and spoke with the patient to determine the following telemedicine visit is appropriate and effective for the patient's care.    The following type of visit was completed:  Video Visit:  The [employer] platform uses a synchronous HIPAA compliant video stream for this encounter.  Video start time:  3:05pm  Video end " "time:  3:56pm  Provider location: [facility] [place]  Patient location:  Home    SUBJECTIVE:  PRESENTATION AND ETIOLOGY  Chief Complaint:   Ankylosing spondylitis, SI region, LBP limiting the ability to perform perform activities of daily living.      Onset / Etiology: longstanding    Pattern Since Onset: Worsened    Pertinent Medical  / Surgical History: Epic Snapshot Reviewed:  Contributing factors to the severity / complexity of the patient's chief complaint include: see providers notes    Pain:  Frequency: Constant or Activity Dependent     LEVEL OF FUNCTION AT START OF CARE  Current Aggrevating Activities / Functional Limitations: walk 5-10', sit 50', stand 10'    Patient's selected goals for physical therapy: tolerate daily routine better, manage pain better overall    CURRENT / PREVIOUS INTERVENTION(S):     Relieving Activities / Self Care / Exercise: no regular exercise routine    Previous / Current therapies for current chief complaint: traditional PT    DEMOGRAPHICS  Employment Status: not working    ===============================================================  OBJECTIVE:  POSTURE:  Observation: Holds breath during transitional movements.  Hyperactive UT, B.    GAIT, LOCOMOTION, and BALANCE:  Gait and Locomotion: Non-Antalgic for about 5-10 minutes per pt.    RANGE OF MOTION:   Active: lumbar flex 75%, ext 25%    MUSCLE PERFORMANCE:   Strength: able to heel walk, toe walk, SLS 3-4\" on R and L    ===============================================================  Today's Treatment:  Initial evaluation  Neuromuscular Reeducation:   Posture  and Kinesthetic Body Awareness - ed on neutral spine  Ed on chronic pain PT POC, walking program  ===============================================================  ASSESSMENT:  Physical Therapy Diagnosis: Musculoskeletal Patterns    Patient requires PT intervention for the following impairments: Limited knowledge of condition and / or self care - inability to " control symptoms, Impaired gait / weight bearing tolerance, Impaired posture / muscle imbalance, Impaired static and / or dynamic balance, Joint hypomobility, Muscle strength and endurance limitations, Pain and Deconditioning    Anticipated Goals and Expected Outcomes:EDUCATION AND SELF CARE  Independent and safe with home exercise / self care program in 6 week(s).  Good working knowledge of posture principles / joint protection in 6 week(s).  FUNCTIONAL MOBILITY  Ambulation without increased pain or symptoms for community distances on all surfaces in 12 week(s).  ADL'S  Standing tolerance 30 minutes without increased pain or symptoms in 12 week(s).  Homemaking / Yardwork without increased pain or symptoms in 12 week(s).    Rehab potential for achieving goals: fair.    ===============================================================  PLAN:   Patient will benefit from skilled physical therapy consisting of: neuromuscular reeducation of: movement, balance, coordination, kinesthetic sense, posture and proprioception for sitting and / or standing activities, education in self care / home management training to include instruction in: joint protection principles and symptom control techniques, therapeutic activities to achieve improved functional performance in: daily actvities and therapeutic exercise to develop: strength and endurance, joint mobility and joint stability    Assessment will be ongoing with changes in treatment as indicated.  Benefits/risks/alternatives to treatment have been reviewed and the patient has been instructed to contact this office if there are any questions or concerns.  This plan of care has been discussed with the patient and the patient is in agreement.     Frequency / Duration:  Patient will be seen for a total of 8-12 visits; at a frequency of every 1-3 weeks.    Total Visit Time: 51  minutes            Evan Trevino          PT                                Date:   "2/22/2021    =====================================================  **  Referring Provider Certification: Referring Provider reviewing certifies that the above treatment / plan of care is required and authorized, and that the patient's plan will be reviewed every ninety (90) days **.   ======================================================     PRESENT: NA    MULTIDISCIPLINARY PATIENT / FAMILY EDUCATION RECORD  Department:  Physical Therapy    Readiness to Learn: Ability to understand verbal instructions,Ability to understand written instructions,Knowledge of educational needs / treatment plan  Specific Barriers to Learning: None  Referrals: None  Learning Needs: Rehabilitation techniques to improve functional independence  Who: Patient  How: Demonstration,Verbal instructions,Written instructions  Response: Appropriate verbal response,Asked questions,Demonstrated ability,Verbalized recall / understanding            Caregiver Signature/Credentials _____________________________ Date ________       Treating Provider: Evan Trevino PT   I have reviewed and certified the need for these services and plan of treatment while under my care.        PHYSICIAN'S SIGNATURE:   _________________________________________  Date___________   Stacia Jack    Certification period:  Beginning of Cert date period: 02/22/21 to  End of Cert period date: 05/22/21     Functional Level Progress Report: Please see attached \"Goal Flow sheet for Functional level.\"    ____X____ Continue Services or       ________ DC Services                Service dates: From  SOC Date: 02/22/21 date to present                         "

## 2021-02-22 NOTE — PROGRESS NOTES
"PHYSICAL THERAPY INITIAL EVALUATION and PLAN OF CARE    Patient Name:  Joel Pineda  (\"Tito)  : 1975  MRN:[id number]    The patient has been notified of the following:  \"This virtual visit will be conducted between you and your provider.  We have found that certain health care needs can be provided without the need for physical presence.  This service lets us provide the care you need with a virtual visit.\"    Due to external, as well as internal [facility] management of the COVID 19 Virus, Joel Pineda, was not seen in our clinic.  As a substitution, we implemented a virtual visit to manage this patient's condition utilizing the [employer] virtual visit platform.  The provider, Evan Trevino PT, reviewed the patient's chart and spoke with the patient to determine the following telemedicine visit is appropriate and effective for the patient's care.    The following type of visit was completed:  Video Visit:  The [employer] platform uses a synchronous HIPAA compliant video stream for this encounter.  Video start time:  3:05pm  Video end time:  3:56pm  Provider location: [facility] [place]  Patient location:  Home    SUBJECTIVE:  PRESENTATION AND ETIOLOGY  Chief Complaint:   Ankylosing spondylitis, SI region, LBP limiting the ability to perform perform activities of daily living.      Onset / Etiology: longstanding    Pattern Since Onset: Worsened    Pertinent Medical  / Surgical History: Epic Snapshot Reviewed:  Contributing factors to the severity / complexity of the patient's chief complaint include: see providers notes    Pain:  Frequency: Constant or Activity Dependent     LEVEL OF FUNCTION AT START OF CARE  Current Aggrevating Activities / Functional Limitations: walk 5-10', sit 50', stand 10'    Patient's selected goals for physical therapy: tolerate daily routine better, manage pain better overall    CURRENT / PREVIOUS INTERVENTION(S):     Relieving Activities / Self Care / Exercise: " "no regular exercise routine    Previous / Current therapies for current chief complaint: traditional PT    DEMOGRAPHICS  Employment Status: not working    ===============================================================  OBJECTIVE:  POSTURE:  Observation: Holds breath during transitional movements.  Hyperactive UT, B.    GAIT, LOCOMOTION, and BALANCE:  Gait and Locomotion: Non-Antalgic for about 5-10 minutes per pt.    RANGE OF MOTION:   Active: lumbar flex 75%, ext 25%    MUSCLE PERFORMANCE:   Strength: able to heel walk, toe walk, SLS 3-4\" on R and L    ===============================================================  Today's Treatment:  Initial evaluation  Neuromuscular Reeducation:   Posture  and Kinesthetic Body Awareness - ed on neutral spine  Ed on chronic pain PT POC, walking program  ===============================================================  ASSESSMENT:  Physical Therapy Diagnosis: Musculoskeletal Patterns    Patient requires PT intervention for the following impairments: Limited knowledge of condition and / or self care - inability to control symptoms, Impaired gait / weight bearing tolerance, Impaired posture / muscle imbalance, Impaired static and / or dynamic balance, Joint hypomobility, Muscle strength and endurance limitations, Pain and Deconditioning    Anticipated Goals and Expected Outcomes:EDUCATION AND SELF CARE  Independent and safe with home exercise / self care program in 6 week(s).  Good working knowledge of posture principles / joint protection in 6 week(s).  FUNCTIONAL MOBILITY  Ambulation without increased pain or symptoms for community distances on all surfaces in 12 week(s).  ADL'S  Standing tolerance 30 minutes without increased pain or symptoms in 12 week(s).  Homemaking / Yardwork without increased pain or symptoms in 12 week(s).    Rehab potential for achieving goals: fair.    ===============================================================  PLAN:   Patient will benefit from " skilled physical therapy consisting of: neuromuscular reeducation of: movement, balance, coordination, kinesthetic sense, posture and proprioception for sitting and / or standing activities, education in self care / home management training to include instruction in: joint protection principles and symptom control techniques, therapeutic activities to achieve improved functional performance in: daily actvities and therapeutic exercise to develop: strength and endurance, joint mobility and joint stability    Assessment will be ongoing with changes in treatment as indicated.  Benefits/risks/alternatives to treatment have been reviewed and the patient has been instructed to contact this office if there are any questions or concerns.  This plan of care has been discussed with the patient and the patient is in agreement.     Frequency / Duration:  Patient will be seen for a total of 8-12 visits; at a frequency of every 1-3 weeks.    Total Visit Time: 51  minutes            Evan Trevino          PT                                Date:  2/22/2021    =====================================================  **  Referring Provider Certification: Referring Provider reviewing certifies that the above treatment / plan of care is required and authorized, and that the patient's plan will be reviewed every ninety (90) days **.   ======================================================     PRESENT: NA    MULTIDISCIPLINARY PATIENT / FAMILY EDUCATION RECORD  Department:  Physical Therapy    Readiness to Learn: Ability to understand verbal instructions,Ability to understand written instructions,Knowledge of educational needs / treatment plan  Specific Barriers to Learning: None  Referrals: None  Learning Needs: Rehabilitation techniques to improve functional independence  Who: Patient  How: Demonstration,Verbal instructions,Written instructions  Response: Appropriate verbal response,Asked questions,Demonstrated  ability,Verbalized recall / understanding

## 2021-02-23 ENCOUNTER — HOSPITAL ENCOUNTER (OUTPATIENT)
Dept: BEHAVIORAL HEALTH | Facility: CLINIC | Age: 46
End: 2021-02-23
Attending: PSYCHIATRY & NEUROLOGY
Payer: MEDICARE

## 2021-02-23 PROCEDURE — 90853 GROUP PSYCHOTHERAPY: CPT | Mod: PO | Performed by: COUNSELOR

## 2021-02-23 PROCEDURE — 90853 GROUP PSYCHOTHERAPY: CPT | Mod: GT | Performed by: COUNSELOR

## 2021-02-23 PROCEDURE — 99214 OFFICE O/P EST MOD 30 MIN: CPT | Mod: 95 | Performed by: PSYCHIATRY & NEUROLOGY

## 2021-02-23 NOTE — GROUP NOTE
Psychotherapy Group Note    PATIENT'S NAME: Joel Pineda  MRN:   [id number]  :   1975  ACCT. NUMBER: [id number]  DATE OF SERVICE: 21  START TIME: 10:00 AM  END TIME: 10:50 AM  FACILITATOR: Lenore French LPCC  TOPIC: MH EBP Group: Specialty [facility] Adult Mental Health Day Treatment  TRACK: [id number]    NUMBER OF PARTICIPANTS: 4    Telemedicine Visit: The patient's condition can be safely assessed and treated via synchronous audio and visual telemedicine encounter.      Reason for Telemedicine Visit: Services only offered telehealth    Originating Site (Patient Location): Patient's home    Distant Site (Provider Location): Provider Remote Setting    Consent:  The patient/guardian has verbally consented to: the potential risks and benefits of telemedicine (video visit) versus in person care; bill my insurance or make self-payment for services provided; and responsibility for payment of non-covered services.     Mode of Communication:  Video Conference via [employer]    As the provider I attest to compliance with applicable laws and regulations related to telemedicine.      Summary of Group / Topics Discussed:  Specialty Topics: Grief/Transitions: Patients received an overview of the grief process.  Patients explored their relationship to loss and how that has affected their mental health symptoms.  Strategies for recognizing loss and ideas for engaging in the grief process was presented and discussed.  Patients identified needs for support and coping skills to manage loss.  The purpose of this specialty topic is to help patients identify the aspects of change due to loss that individuals experience in addition to mental health symptoms to better cope with the grief, loss, and life transitions.      Patient Session Goals / Objectives:    Identified grief, loss, and life transitions     Discussed how grief impacts mental health symptoms and disrupts usual functioning    Identified needs  for support and coping with grief and planned further action for coping        Patient Participation / Response:  Fully participated with the group by sharing personal reflections / insights and openly received / provided feedback with other participants.    Demonstrated understanding of topics discussed through group discussion and participation, Identified / Expressed readiness to act on skill suggestions discussed in topic and Verbalized understanding of ways to proactively manage illness    Treatment Plan:  Patient has a current master individualized treatment plan.  See Epic treatment plan for more information.    Lenore French, [facility]
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
## 2021-02-23 NOTE — GROUP NOTE
"Process Group Note    PATIENT'S NAME: Joel Pineda  MRN:   [id number]  :   1975  ACCT. NUMBER: [id number]  DATE OF SERVICE: 21  START TIME:  9:00 AM  END TIME:  9:50 AM  FACILITATOR: Lenore French [facility]  TOPIC:  Process Group    Diagnoses:  296.33 (F33.2) Major Depressive Disorder, Recurrent Episode, Severe _.  4. Other Diagnoses that is relevant to services:   300.00 (F41.9) Unspecified Anxiety Disorder  301.83 (F60.3) Borderline Personality Disorder.      [facility] Mental Health Day Treatment  TRACK: 6A    NUMBER OF PARTICIPANTS: 5    Telemedicine Visit: The patient's condition can be safely assessed and treated via synchronous audio and visual telemedicine encounter.      Reason for Telemedicine Visit: Services only offered telehealth    Originating Site (Patient Location): Patient's home    Distant Site (Provider Location): Provider Remote Setting    Consent:  The patient/guardian has verbally consented to: the potential risks and benefits of telemedicine (video visit) versus in person care; bill my insurance or make self-payment for services provided; and responsibility for payment of non-covered services.     Mode of Communication:  Video Conference via Partschannel    As the provider I attest to compliance with applicable laws and regulations related to telemedicine.            Data:    Session content: At the start of this group, patients were invited to check in by identifying themselves, describing their current emotional status, and identifying issues to address in this group.   Area(s) of treatment focus addressed in this session included Symptom Management and Personal Safety.    Tito reported feeling \"alright\" today.  His goal for the day is to continue looking for a new therapist.  He also has a NONA meeting today and he needs to read the packet of information before the meeting.  Client declined additional process time but contributed to group discussion and provided " feedback and support to peers.      Therapeutic Interventions/Treatment Strategies:  Psychotherapist offered support, feedback and validation.    Assessment:    Patient response:   Patient responded to session by accepting feedback, giving feedback and listening    Possible barriers to participation / learning include: and no barriers identified    Health Issues:   None reported       Substance Use Review:   Substance Use: No active concerns identified.    Mental Status/Behavioral Observations  Appearance:   Appropriate   Eye Contact:   Good   Psychomotor Behavior: Normal   Attitude:   Cooperative   Orientation:   All  Speech   Rate / Production: Normal    Volume:  Normal   Mood:    Depressed   Affect:    Appropriate   Thought Content:   Clear  Thought Form:  Coherent  Logical     Insight:    Good     Plan:     Safety Plan: No current safety concerns identified.  Recommended that patient call 911 or go to the local ED should there be a change in any of these risk factors.     Barriers to treatment: None identified    Patient Contracts (see media tab):  None    Substance Use: Not addressed in session     Continue or Discharge: Patient will continue in Adult Day Treatment (ADT)  as planned. Patient is likely to benefit from learning and using skills as they work toward the goals identified in their treatment plan.      Lenore French, [facility]  February 23, 2021

## 2021-02-23 NOTE — GROUP NOTE
Psychotherapy Group Note    PATIENT'S NAME: Joel Pineda  MRN:   [id number]  :   1975  ACCT. NUMBER: [id number]  DATE OF SERVICE: 21  START TIME: 11:00 AM  END TIME: 11:50 AM  FACILITATOR: Morenita Lau LPCC  TOPIC: MH EBP Group: Coping Skills  [facility] Adult Mental Health Day Treatment  TRACK: 6A    NUMBER OF PARTICIPANTS: 4    Summary of Group / Topics Discussed:  Coping Skills: Meditation: Patients learned about meditation and explored how and when to utilize it to increase focus, reduce mental health symptoms, decrease physical tension, and improve mental well-being.  Approaches to meditation were presented as a means of increasing self-awareness. The benefits of various meditation practices were discussed, as well as barriers to utilization of this coping strategy.     Patient Session Goals / Objectives:    Understand the purpose and efficacy of using meditation modalities to reduce stress / symptoms.    Review / discuss situations in daily life that cause distress, where establishing a meditation routine or meditating as needed may improve functioning.      Verbalize understanding of how and when to apply grounding strategies to reduce distress and increase presence in the moment.    Practice meditation and address barriers to use in daily life.  Telemedicine Visit: The patient's condition can be safely assessed and treated via synchronous audio and visual telemedicine encounter.      Reason for Telemedicine Visit: Services only offered telehealth and due to COVID-19    Originating Site (Patient Location): Patient's home    Distant Site (Provider Location): Provider Remote Setting    Consent:  The patient/guardian has verbally consented to: the potential risks and benefits of telemedicine (video visit) versus in person care; bill my insurance or make self-payment for services provided; and responsibility for payment of non-covered services.     Mode of Communication:  Video  Conference via [employer]    As the provider I attest to compliance with applicable laws and regulations related to telemedicine.          Patient Participation / Response:  Fully participated with the group by sharing personal reflections / insights and openly received / provided feedback with other participants.    Demonstrated understanding of topics discussed through group discussion and participation, Expressed understanding of the relevance / importance of coping skills at distressing times in life and Demonstrated knowledge of when to consider using a variety of coping skills in daily life    Treatment Plan:  Patient has a current master individualized treatment plan.  See Epic treatment plan for more information.    Morenita Lau, [facility]

## 2021-02-24 ENCOUNTER — HOSPITAL ENCOUNTER (OUTPATIENT)
Dept: BEHAVIORAL HEALTH | Facility: CLINIC | Age: 46
End: 2021-02-24
Attending: PSYCHIATRY & NEUROLOGY
Payer: MEDICARE

## 2021-02-24 PROCEDURE — 90853 GROUP PSYCHOTHERAPY: CPT | Mod: GT | Performed by: COUNSELOR

## 2021-02-24 PROCEDURE — 90853 GROUP PSYCHOTHERAPY: CPT | Mod: PO | Performed by: COUNSELOR

## 2021-02-24 NOTE — GROUP NOTE
Psychotherapy Group Note    PATIENT'S NAME: Joel Pineda  MRN:   [id number]  :   1975  ACCT. NUMBER: [id number]  DATE OF SERVICE: 21  START TIME:  9:00 AM  END TIME:  9:50 AM  FACILITATOR: Morenita Lau LPCC  TOPIC: MH EBP Group: Relationship Skills  [facility] Adult Mental Health Day Treatment  TRACK: 6A    NUMBER OF PARTICIPANTS: 7    Summary of Group / Topics Discussed:  Relationship Skills: Dependencies: Patients were provided with a general overview of different types of dependencies and how they relate to symptoms and functioning. The purpose is to help patients identify the different types of dependencies, how they may be impacted by them, and to gain awareness and skills to work towards healthier relationships.    Patient Session Goals / Objectives:    Familiarized patients with the concept of interpersonal relationships and their characteristics.      Discussed and practiced strategies to promote healthier understanding of interpersonal relationships with a focus on dependencies    Identified types of dependencies in patient s lives and how they impact their symptoms and functioning    Telemedicine Visit: The patient's condition can be safely assessed and treated via synchronous audio and visual telemedicine encounter.      Reason for Telemedicine Visit: Services only offered telehealth and due to COVID-19    Originating Site (Patient Location): Patient's home    Distant Site (Provider Location): Provider Remote Setting    Consent:  The patient/guardian has verbally consented to: the potential risks and benefits of telemedicine (video visit) versus in person care; bill my insurance or make self-payment for services provided; and responsibility for payment of non-covered services.     Mode of Communication:  Video Conference via Zoom    As the provider I attest to compliance with applicable laws and regulations related to telemedicine.      Patient Participation / Response:  Fully  participated with the group by sharing personal reflections / insights and openly received / provided feedback with other participants.    Demonstrated understanding of topics discussed through group discussion and participation, Demonstrated understanding of relationship skills and communication skills and Identified / Expressed personal readiness to incorporate effective communication skills    Treatment Plan:  Patient has a current master individualized treatment plan.  See Epic treatment plan for more information.    Morenita Lau, LPCC

## 2021-02-24 NOTE — GROUP NOTE
Psychotherapy Group Note    PATIENT'S NAME: Joel Pineda  MRN:   [id number]  :   1975  ACCT. NUMBER: [id number]  DATE OF SERVICE: 21  START TIME: 11:00 AM  END TIME: 11:50 AM  FACILITATOR: Morenita Lau LPCC  TOPIC: MH EBP Group: Behavioral Activation  [facility] Adult Mental Health Day Treatment  TRACK: 6A    NUMBER OF PARTICIPANTS: 7    Summary of Group / Topics Discussed:  Behavioral Activation: Behavior Chain Analysis: Patients explored the steps leading up to identified unwanted behaviors, and ways to replace them with more effective behaviors. Patients examined factors contributing to unwanted behaviors, with a goal of determining ways to interrupt the unhealthy patterns.    Patient Session Goals / Objectives:    Identify thoughts, feelings, and actions that contribute to unwanted behaviors    Identify thoughts, feelings, and actions that contribute to more effective/healthy and desired behaviors    Make plans to track and implement changes and share experiences in group    Identify personal barriers to change    Telemedicine Visit: The patient's condition can be safely assessed and treated via synchronous audio and visual telemedicine encounter.      Reason for Telemedicine Visit: Services only offered telehealth and due to COVID-19    Originating Site (Patient Location): Patient's home    Distant Site (Provider Location): Provider Remote Setting    Consent:  The patient/guardian has verbally consented to: the potential risks and benefits of telemedicine (video visit) versus in person care; bill my insurance or make self-payment for services provided; and responsibility for payment of non-covered services.     Mode of Communication:  Video Conference via Zoom    As the provider I attest to compliance with applicable laws and regulations related to telemedicine.        Patient Participation / Response:  Fully participated with the group by sharing personal reflections / insights and  openly received / provided feedback with other participants.    Demonstrated understanding of topics discussed through group discussion and participation, Expressed understanding of the relationship between behaviors, thoughts, and feelings and Shared experiences and challenges with making behavioral changes    Treatment Plan:  Patient has a current master individualized treatment plan.  See Epic treatment plan for more information.    Morenita Lau, [facility]

## 2021-02-24 NOTE — GROUP NOTE
"Process Group Note    PATIENT'S NAME: Joel Pineda  MRN:   [id number]  :   1975  ACCT. NUMBER: [id number]  DATE OF SERVICE: 21  START TIME: 10:00 AM  END TIME: 10:50 AM  FACILITATOR: Lenore French [facility]  TOPIC:  Process Group    Diagnoses:  296.33 (F33.2) Major Depressive Disorder, Recurrent Episode, Severe _.  4. Other Diagnoses that is relevant to services:   300.00 (F41.9) Unspecified Anxiety Disorder  301.83 (F60.3) Borderline Personality Disorder.      [facility] Mental Health Day Treatment  TRACK: 6A    NUMBER OF PARTICIPANTS: 7    Telemedicine Visit: The patient's condition can be safely assessed and treated via synchronous audio and visual telemedicine encounter.      Reason for Telemedicine Visit: Services only offered telehealth    Originating Site (Patient Location): Patient's home    Distant Site (Provider Location): Provider Remote Setting    Consent:  The patient/guardian has verbally consented to: the potential risks and benefits of telemedicine (video visit) versus in person care; bill my insurance or make self-payment for services provided; and responsibility for payment of non-covered services.     Mode of Communication:  Video Conference via [employer]    As the provider I attest to compliance with applicable laws and regulations related to telemedicine.            Data:    Session content: At the start of this group, patients were invited to check in by identifying themselves, describing their current emotional status, and identifying issues to address in this group.   Area(s) of treatment focus addressed in this session included Symptom Management and Personal Safety.    Tito reported feeling \"relieved\" today because he was able to get into see a new therapist (although not until May).  His goal for the day is to read through the packet for his NONA meeting tomorrow.  Client declined additional process time but contributed to group discussion and provided " feedback and support to peers.      Therapeutic Interventions/Treatment Strategies:  Psychotherapist offered support, feedback and validation.    Assessment:    Patient response:   Patient responded to session by accepting feedback, giving feedback and listening    Possible barriers to participation / learning include: and no barriers identified    Health Issues:   Yes: Pain, Associated Psychological Distress       Substance Use Review:   Substance Use: No active concerns identified.    Mental Status/Behavioral Observations  Appearance:   Appropriate   Eye Contact:   Good   Psychomotor Behavior: Normal   Attitude:   Cooperative   Orientation:   All  Speech   Rate / Production: Normal    Volume:  Normal   Mood:    Depressed   Affect:    Appropriate   Thought Content:   Clear  Thought Form:  Coherent  Logical     Insight:    Good     Plan:     Safety Plan: No current safety concerns identified.  Recommended that patient call 911 or go to the local ED should there be a change in any of these risk factors.     Barriers to treatment: None identified    Patient Contracts (see media tab):  None    Substance Use: Not addressed in session     Continue or Discharge: Patient will continue in Adult Day Treatment (ADT)  as planned. Patient is likely to benefit from learning and using skills as they work toward the goals identified in their treatment plan.      Lenore French, [facility]  February 24, 2021

## 2021-02-25 NOTE — PATIENT INSTRUCTIONS
After Visit Instructions:     Thank you for coming to [facility] [place] for your care. It is my goal to partner with you to help you reach your optimal state of health.     Continue daily self-care, identifying contributing factors, and monitoring variations in pain level. Continue to integrate self-care into your life.      1. Schedule pain psychology assessment/visits as recommended by your health psychologist  2. Schedule physical therapy assessment/visits as recommended by your physical therapist  3. Schedule VIDEO  follow-up with JOCELYN Zavala NP-C in 1 month PRIOR to any refills that will be due at that time.   4. Medication recommendations:   1.  No change to current medications.  Baclofen refilled.      JOCELYN Padgett NP-C  [facility]    Clinic Number:  [phone]     Call with any questions about your care and for scheduling assistance.     Calls are returned Monday through Friday between 8 AM and 4:30 PM. We usually get back to you within 2 business days depending on the issue/request.    If we are prescribing your medications:    For opioid medication refills, call the clinic or send a [employer] message 7 days in advance.  Please include:    Name of requested medication    Name of the pharmacy.    For non-opioid medications, call your pharmacy directly to request a refill. Please allow 3-4 days to be processed.     Per [place] State Law:    All controlled substance prescriptions must be filled within 30 days of being written.      For those controlled substances allowing refills, pickup must occur within 30 days of last fill.      We believe regular attendance is key to your success in our program!      Any time you are unable to keep your appointment we ask that you call us at least 24 hours in advance to cancel.This will allow us to offer the appointment time to another patient.   Multiple missed appointments may lead to  dismissal from the clinic.

## 2021-02-25 NOTE — PROGRESS NOTES
Patient Active Problem List   Diagnosis     Major depressive disorder, recurrent episode (H)     Intermittent asthma     Hyperlipidemia LDL goal <100     Chronic nonallergic rhinitis     Diverticulosis     GERD (gastroesophageal reflux disease)     Anxiety     LLOYD (obstructive sleep apnea)- mild (AHI 11)     Intracranial arachnoid cyst     Facet arthritis of cervical region     Acquired hypothyroidism     Bipolar 2 disorder (H)     Chronic midline low back pain without sciatica     Irritable bowel syndrome with diarrhea     B12 deficiency     Essential hypertension with goal blood pressure less than 140/90     Chronic pain syndrome     Ankylosing spondylitis of sacral region (H)     Morbid obesity due to hypertriglyceridemia (H)     Fatty infiltration of liver     DDD (degenerative disc disease), lumbar     Type 2 diabetes mellitus with complication, without long-term current use of insulin (H)     Peripheral polyneuropathy     History of pulmonary embolism     Ingrown toenail     Hypertriglyceridemia     Gastroparesis     Orthostatic dizziness     POTS (postural orthostatic tachycardia syndrome)     PHN (postherpetic neuralgia)     Dysautonomia (H)     Major depressive disorder, recurrent episode, severe (H)     Right foot pain       Current Outpatient Medications:      acetaminophen 500 MG CAPS, Take 1,000 mg by mouth 2 times daily , Disp: 60 capsule, Rfl:      albuterol (PROAIR HFA/PROVENTIL HFA/VENTOLIN HFA) 108 (90 Base) MCG/ACT inhaler, Inhale 2 puffs into the lungs every 4 hours as needed for shortness of breath / dyspnea or wheezing, Disp: 8.5 g, Rfl: 11     albuterol (PROVENTIL) (2.5 MG/3ML) 0.083% neb solution, Take 1 vial (2.5 mg) by nebulization every 6 hours as needed for shortness of breath / dyspnea or wheezing, Disp: 200 mL, Rfl: 3     aspirin (ASA) 81 MG tablet, Take 81 mg by mouth daily , Disp: , Rfl:      baclofen (LIORESAL) 10 MG tablet, Take 0.5-1 tablets (5-10 mg) by mouth 2 times daily, Disp:  60 tablet, Rfl: 1     cetirizine (ZYRTEC) 10 MG tablet, Take 1 tablet (10 mg) by mouth At Bedtime, Disp: 30 tablet, Rfl: 11     cholecalciferol (VITAMIN D3) 69008 units (1250 mcg) capsule, Take one capsule every two weeks., Disp: 24 capsule, Rfl: 1     clonazePAM (KLONOPIN) 0.5 MG tablet, Take 0.5 mg by mouth At Bedtime , Disp: , Rfl:      clotrimazole (LOTRIMIN) 1 % external cream, Apply daily, Disp: , Rfl:      cyanocobalamin (CYANOCOBALAMIN) 1000 MCG/ML injection, Inject 1 mL (1,000 mcg) into the muscle every 30 days, Disp: 10 mL, Rfl: 0     DULoxetine (CYMBALTA) 60 MG capsule, Take 60 mg by mouth 2 times daily , Disp: , Rfl:      EPINEPHrine (EPIPEN/ADRENACLICK/OR ANY BX GENERIC EQUIV) 0.3 MG/0.3ML injection 2-pack, Inject 0.3 mLs (0.3 mg) into the muscle once as needed for anaphylaxis (Patient not taking: Reported on 2/11/2021), Disp: 0.6 mL, Rfl: 3     etanercept (ENBREL SURECLICK) 50 MG/ML autoinjector, Inject 50 mg Subcutaneous once a week Hold for signs of infection, and seek medical attention., Disp: 4 mL, Rfl: 6     famotidine (PEPCID) 20 MG tablet, Prior to administration of Humira every 2 weeks (Patient taking differently: Take 20 mg by mouth every 7 days Prior to Enbrel Injections to prevent skin reaction), Disp: , Rfl:      fluticasone (FLONASE) 50 MCG/ACT nasal spray, Spray 1 spray into both nostrils daily, Disp: , Rfl:      fluticasone-salmeterol (ADVAIR) 500-50 MCG/DOSE inhaler, Inhale 1 puff into the lungs every 12 hours, Disp: 3 each, Rfl: 3     glipiZIDE (GLUCOTROL XL) 5 MG 24 hr tablet, Take 1 tablet (5 mg) by mouth daily, Disp: 90 tablet, Rfl: 1     lamoTRIgine (LAMICTAL) 100 MG tablet, Take 200 mg by mouth daily, Disp: , Rfl:      levothyroxine (SYNTHROID/LEVOTHROID) 75 MCG tablet, TAKE 1 TABLET EVERY MORNING, Disp: 90 tablet, Rfl: 0     lidocaine (XYLOCAINE) 5 % external ointment, Apply topically 4 times daily as needed (pain), Disp: 50 g, Rfl: 2     melatonin 3 MG tablet, Take 9 mg by mouth  nightly as needed , Disp: , Rfl:      metoprolol succinate ER (TOPROL-XL) 200 MG 24 hr tablet, Take 1 tablet (200 mg) by mouth 2 times daily, Disp: 180 tablet, Rfl: 1     montelukast (SINGULAIR) 10 MG tablet, Take 1 tablet (10 mg) by mouth every evening, Disp: 90 tablet, Rfl: 1     nabumetone (RELAFEN) 500 MG tablet, Take 1-2 tablets (500-1,000 mg) by mouth 2 times daily (with meals) as needed for back/joint pain., Disp: 60 tablet, Rfl: 5     naloxone (NARCAN) 4 MG/0.1ML nasal spray, Spray 1 spray (4 mg) into one nostril alternating nostrils as needed for opioid reversal every 2-3 minutes until assistance arrives, Disp: 0.2 mL, Rfl: 0     omega-3 acid ethyl esters (LOVAZA) 1 g capsule, TAKE 2 CAPSULES BY MOUTH TWICE DAILY., Disp: 360 capsule, Rfl: 1     omeprazole 20 MG tablet, Take 20 mg by mouth daily , Disp: , Rfl:      ondansetron (ZOFRAN-ODT) 8 MG ODT tab, Take 1 tablet (8 mg) by mouth every 8 hours as needed for nausea, Disp: 30 tablet, Rfl: 3     order for DME, Equipment being ordered: Assure Compression stockings (ANNETTA) Large, closed toe, thigh-high 30-40 compression, Disp: 2 Units, Rfl: 3     order for DME, Equipment being ordered: lumbosacral belt/brace, Disp: 1 Units, Rfl: 0     order for DME, Respironics REMSTAR 60 Series Auto CPAP 9-13 cm H2O, Wisp nasal mask w/a large cushion and a chinstrap, Disp: , Rfl:      oxyCODONE (ROXICODONE) 5 MG tablet, Take 1-2 tablets (5-10 mg) by mouth every 6 hours as needed for pain (maximum 4 tablet(s) per day), Disp: 35 tablet, Rfl: 0     polyethylene glycol (MIRALAX) 17 g packet, Take 1 packet by mouth daily, Disp: , Rfl:      pregabalin (LYRICA) 150 MG capsule, Take 1 capsule (150 mg) by mouth 2 times daily, Disp: 60 capsule, Rfl: 11     QUEtiapine (SEROQUEL) 25 MG tablet, Take 100 mg by mouth At Bedtime , Disp: , Rfl:      ramipril (ALTACE) 10 MG capsule, Take 1 capsule (10 mg) by mouth daily, Disp: 90 capsule, Rfl: 1     rizatriptan (MAXALT-MLT) 5 MG ODT, DISSOLVE 1  TABLET BY MOUTH AT ONSET OF HEADACHE, Disp: 30 tablet, Rfl: 0     rosuvastatin (CRESTOR) 40 MG tablet, Take 1 tablet (40 mg) by mouth daily, Disp: 90 tablet, Rfl: 0     syringe, disposable, (BD TUBERCULIN SYRINGE) 1 ML MISC, Equipment being ordered: 1 ml tuberculin syringes to be used for Vitamin B12 injections., Disp: 12 each, Rfl: 11     vitamin B complex with vitamin C (STRESS TAB) tablet, Take 1 tablet by mouth daily, Disp: , Rfl:      ZINC SULFATE-VITAMIN C MT, Take 1 tablet by mouth daily, Disp: , Rfl:   Psychiatry staffing: case discussed  Diagnosis:    Mood disorder and anxiety, listed as depression in day rx and bipolar 2 in the above list.  Recent increase in symptoms led to extension here..  Graduating next week.

## 2021-02-26 ENCOUNTER — OFFICE VISIT (OUTPATIENT)
Dept: FAMILY MEDICINE | Facility: CLINIC | Age: 46
End: 2021-02-26
Payer: MEDICARE

## 2021-02-26 ENCOUNTER — HOSPITAL ENCOUNTER (OUTPATIENT)
Dept: BEHAVIORAL HEALTH | Facility: CLINIC | Age: 46
End: 2021-02-26
Attending: PSYCHIATRY & NEUROLOGY
Payer: MEDICARE

## 2021-02-26 ENCOUNTER — TELEPHONE (OUTPATIENT)
Dept: RHEUMATOLOGY | Facility: CLINIC | Age: 46
End: 2021-02-26

## 2021-02-26 VITALS
RESPIRATION RATE: 20 BRPM | DIASTOLIC BLOOD PRESSURE: 91 MMHG | SYSTOLIC BLOOD PRESSURE: 152 MMHG | OXYGEN SATURATION: 98 % | WEIGHT: 315 LBS | HEART RATE: 80 BPM | BODY MASS INDEX: 36.45 KG/M2 | HEIGHT: 78 IN

## 2021-02-26 DIAGNOSIS — R51.9 FACIAL PAIN: Primary | ICD-10-CM

## 2021-02-26 DIAGNOSIS — Z86.19 HISTORY OF SHINGLES: ICD-10-CM

## 2021-02-26 DIAGNOSIS — I10 ESSENTIAL HYPERTENSION WITH GOAL BLOOD PRESSURE LESS THAN 140/90: Chronic | ICD-10-CM

## 2021-02-26 PROCEDURE — 90853 GROUP PSYCHOTHERAPY: CPT | Mod: PO | Performed by: COUNSELOR

## 2021-02-26 PROCEDURE — 99214 OFFICE O/P EST MOD 30 MIN: CPT | Performed by: FAMILY MEDICINE

## 2021-02-26 RX ORDER — LIDOCAINE 50 MG/G
OINTMENT TOPICAL 4 TIMES DAILY PRN
Qty: 50 G | Refills: 2 | Status: SHIPPED | OUTPATIENT
Start: 2021-02-26 | End: 2021-10-04

## 2021-02-26 RX ORDER — RAMIPRIL 10 MG/1
10 CAPSULE ORAL DAILY
Qty: 90 CAPSULE | Refills: 1 | Status: SHIPPED | OUTPATIENT
Start: 2021-02-26 | End: 2021-02-26

## 2021-02-26 RX ORDER — RAMIPRIL 10 MG/1
10 CAPSULE ORAL DAILY
Qty: 90 CAPSULE | Refills: 1 | Status: SHIPPED | OUTPATIENT
Start: 2021-02-26 | End: 2022-03-15

## 2021-02-26 RX ORDER — VALACYCLOVIR HYDROCHLORIDE 1 G/1
1000 TABLET, FILM COATED ORAL 3 TIMES DAILY
Qty: 21 TABLET | Refills: 0 | Status: SHIPPED | OUTPATIENT
Start: 2021-02-26 | End: 2021-03-09

## 2021-02-26 RX ORDER — LIDOCAINE 50 MG/G
OINTMENT TOPICAL 4 TIMES DAILY PRN
Qty: 50 G | Refills: 2 | Status: SHIPPED | OUTPATIENT
Start: 2021-02-26 | End: 2021-02-26

## 2021-02-26 ASSESSMENT — PAIN SCALES - GENERAL: PAINLEVEL: MILD PAIN (3)

## 2021-02-26 ASSESSMENT — PATIENT HEALTH QUESTIONNAIRE - PHQ9
5. POOR APPETITE OR OVEREATING: NEARLY EVERY DAY
SUM OF ALL RESPONSES TO PHQ QUESTIONS 1-9: 11

## 2021-02-26 ASSESSMENT — ANXIETY QUESTIONNAIRES
IF YOU CHECKED OFF ANY PROBLEMS ON THIS QUESTIONNAIRE, HOW DIFFICULT HAVE THESE PROBLEMS MADE IT FOR YOU TO DO YOUR WORK, TAKE CARE OF THINGS AT HOME, OR GET ALONG WITH OTHER PEOPLE: VERY DIFFICULT
2. NOT BEING ABLE TO STOP OR CONTROL WORRYING: NEARLY EVERY DAY
1. FEELING NERVOUS, ANXIOUS, OR ON EDGE: NEARLY EVERY DAY
7. FEELING AFRAID AS IF SOMETHING AWFUL MIGHT HAPPEN: SEVERAL DAYS
5. BEING SO RESTLESS THAT IT IS HARD TO SIT STILL: SEVERAL DAYS
6. BECOMING EASILY ANNOYED OR IRRITABLE: MORE THAN HALF THE DAYS
3. WORRYING TOO MUCH ABOUT DIFFERENT THINGS: NEARLY EVERY DAY
GAD7 TOTAL SCORE: 16

## 2021-02-26 ASSESSMENT — MIFFLIN-ST. JEOR: SCORE: 2568.62

## 2021-02-26 NOTE — GROUP NOTE
Psychotherapy Group Note    PATIENT'S NAME: Joel Pineda  MRN:   [id number]  :   1975  ACCT. NUMBER: [id number]  DATE OF SERVICE: 21  START TIME: 11:00 AM  END TIME: 11:50 AM  FACILITATOR: Morenita Lau LPCC  TOPIC: MH EBP Group: Specialty Awareness  [facility] Day Treatment  TRACK: 6A    NUMBER OF PARTICIPANTS: 6    Summary of Group / Topics Discussed:  Specialty Topics: Autobiography: This topic provides each patient with an opportunity to share his/her life story by including background information, significant events that have been life changing, and a means to talk about how mental health and substance abuse has impacted overall functioning and development.      Patient Session Goals / Objectives:    Patient  had opportunity his/her personal story and give some background on their past and/or listen to another patient share their personal story    Identified ways that mental illness and substance use has impacted functioning and development    Examined their lives with and without the impact of substances /mental illness    Telemedicine Visit: The patient's condition can be safely assessed and treated via synchronous audio and visual telemedicine encounter.      Reason for Telemedicine Visit: Services only offered telehealth and due to COVID-19    Originating Site (Patient Location): Patient's home    Distant Site (Provider Location): Provider Remote Setting    Consent:  The patient/guardian has verbally consented to: the potential risks and benefits of telemedicine (video visit) versus in person care; bill my insurance or make self-payment for services provided; and responsibility for payment of non-covered services.     Mode of Communication:  Video Conference via Zoom    As the provider I attest to compliance with applicable laws and regulations related to telemedicine.      Patient Participation / Response:  Fully participated with the group by sharing personal  reflections / insights and openly received / provided feedback with other participants.    Demonstrated understanding of topics discussed through group discussion and participation, Identified / Expressed readiness to act on skill suggestions discussed in topic and Verbalized understanding of ways to proactively manage illness    Treatment Plan:  Patient has a current master individualized treatment plan.  See Epic treatment plan for more information.    Morenita Lau, LPCC

## 2021-02-26 NOTE — GROUP NOTE
"Process Group Note    PATIENT'S NAME: Joel Pineda  MRN:   [id number]  :   1975  ACCT. NUMBER: [id number]  DATE OF SERVICE: 21  START TIME:  9:00 AM  END TIME:  9:50 AM  FACILITATOR: Lenore French [facility]  TOPIC:  Process Group    Diagnoses:  296.33 (F33.2) Major Depressive Disorder, Recurrent Episode, Severe _.  4. Other Diagnoses that is relevant to services:   300.00 (F41.9) Unspecified Anxiety Disorder  301.83 (F60.3) Borderline Personality Disorder.      [facility] Day Treatment  TRACK: 6A    NUMBER OF PARTICIPANTS: 6    Telemedicine Visit: The patient's condition can be safely assessed and treated via synchronous audio and visual telemedicine encounter.      Reason for Telemedicine Visit: Services only offered telehealth    Originating Site (Patient Location): Patient's home    Distant Site (Provider Location): Provider Remote Setting    Consent:  The patient/guardian has verbally consented to: the potential risks and benefits of telemedicine (video visit) versus in person care; bill my insurance or make self-payment for services provided; and responsibility for payment of non-covered services.     Mode of Communication:  Video Conference via Semasio    As the provider I attest to compliance with applicable laws and regulations related to telemedicine.            Data:    Session content: At the start of this group, patients were invited to check in by identifying themselves, describing their current emotional status, and identifying issues to address in this group.   Area(s) of treatment focus addressed in this session included Symptom Management and Personal Safety.    Tito reported feeling like \"mashed potatoes\" today.  Tito took some process time to share about his recent [employer] meeting.  He reported that it was \" a mess\" and he has been really frustrated and irritated with other people there and they were making threats towards him.      Therapeutic " Interventions/Treatment Strategies:  Psychotherapist offered support, feedback and validation.    Assessment:    Patient response:   Patient responded to session by accepting feedback, giving feedback and listening    Possible barriers to participation / learning include: and no barriers identified    Health Issues:   None reported       Substance Use Review:   Substance Use: No active concerns identified.    Mental Status/Behavioral Observations  Appearance:   Appropriate   Eye Contact:   Good   Psychomotor Behavior: Normal   Attitude:   Cooperative   Orientation:   All  Speech   Rate / Production: Normal    Volume:  Normal   Mood:    Depressed   Affect:    Appropriate   Thought Content:   Clear  Thought Form:  Coherent  Logical     Insight:    Good     Plan:     Safety Plan: No current safety concerns identified.  Recommended that patient call 911 or go to the local ED should there be a change in any of these risk factors.     Barriers to treatment: None identified    Patient Contracts (see media tab):  None    Substance Use: Not addressed in session     Continue or Discharge: Patient will continue in Adult Day Treatment (ADT)  as planned. Patient is likely to benefit from learning and using skills as they work toward the goals identified in their treatment plan.      Lenore French, [facility]  February 26, 2021

## 2021-02-26 NOTE — PROGRESS NOTES
Assessment & Plan     Facial pain  Short history of some left-sided facial discomfort that he says really is not painful but there.  We discussed the possibility of trigeminal neuralgia but I do not really think it fits that criteria quite yet.  He asked whether it could be related to his history of shingles and some postherpetic neuralgia.  Difficult to say but my guess is no because it does not come and go as much.  But it certainly would be fine to try some topical numbing ointment as he has in the past.  This has been helpful.  And if he does start to break out in a rash I would go ahead and start the Valtrex again.  - valACYclovir (VALTREX) 1000 mg tablet; Take 1 tablet (1,000 mg) by mouth 3 times daily for 7 days    History of shingles  As above  - lidocaine (XYLOCAINE) 5 % external ointment; Apply topically 4 times daily as needed (pain)    Essential hypertension with goal blood pressure less than 140/90  Running a little higher today but typically under good control so we will continue same dosage and monitoring  - ramipril (ALTACE) 10 MG capsule; Take 1 capsule (10 mg) by mouth daily       See Patient Instructions    Return in about 1 week (around 3/5/2021) for Contact me with progress.    Ksenia Lyles MD  [facility]    Subjective     HPI       Concern -   Numbness on LT Side of face  Onset: a month  Description: tightness in lt side of face, gets worse at night  Intensity: moderate  Progression of Symptoms:  constant  Accompanying Signs & Symptoms: numbness  Previous history of similar problem: no  Precipitating factors:        Worsened by: unsure  Alleviating factors:        Improved by: nothing  Therapies tried and outcome:  none     Pain in RT side of neck/ lower head  -sharp pain    Ramipril and lidocaine ointment rx to [facility]    Note some symptoms as above.  Started this a few weeks ago but it is not particularly painful.  Just kind of a tight and slightly burning  "sensation mid face and around his ear.  Has noted some tearing on occasion but no visual changes and no headache.  Last night started to get a burning pain at the base of his skull on the right and was worried that shingles could be coming back.    Review of Systems   Constitutional, HEENT, cardiovascular, pulmonary, gi and gu systems are negative, except as otherwise noted.      Objective    BP (!) 152/91 (BP Location: Right arm, Patient Position: Sitting, Cuff Size: Adult Large)   Pulse 80   Resp 20   Ht 1.981 m (6' 6\")   Wt (!) 156 kg (344 lb)   SpO2 98%   BMI 39.75 kg/m    Body mass index is 39.75 kg/m .  Physical Exam   Alert, pleasant, upbeat, and in no apparent discomfort.  No evidence of rash and no particular tenderness to palpation in the neck and face  S1 and S2 normal, no murmurs, clicks, gallops or rubs. Regular rate and rhythm. Chest is clear; no wheezes or rales. No edema or JVD.  Past labs reviewed with the patient.                 "

## 2021-02-26 NOTE — GROUP NOTE
Psychotherapy Group Note    PATIENT'S NAME: Joel Pineda  MRN:   [id number]  :   1975  ACCT. NUMBER: [id number]  DATE OF SERVICE: 21  START TIME: 10:00 AM  END TIME: 10:50 AM  FACILITATOR: Morenita Lau LPCC  TOPIC: MH EBP Group: Cognitive Restructuring  [facility] Day Treatment  TRACK: 6A    NUMBER OF PARTICIPANTS: 6    Summary of Group / Topics Discussed:  Cognitive Restructuring: Distortions: Patients received an overview of how to identify common cognitive distortions. Patients will explore alternatives to cognitive distortions and practice challenging their negative thought patterns. The goal is to help patients target modify ineffective thought patterns.     Patient Session Goals / Objectives:    Familiarized self with ineffective / unhealthy thoughts and how they develop.      Explored impact of ineffective thoughts / distortions on mood and activity    Formulated new neutral/positive alternatives to challenge less helpful / ineffective thoughts.    Practiced and plan to apply in daily life    Telemedicine Visit: The patient's condition can be safely assessed and treated via synchronous audio and visual telemedicine encounter.      Reason for Telemedicine Visit: Services only offered telehealth and due to COVID-19    Originating Site (Patient Location): Patient's home    Distant Site (Provider Location): Provider Remote Setting    Consent:  The patient/guardian has verbally consented to: the potential risks and benefits of telemedicine (video visit) versus in person care; bill my insurance or make self-payment for services provided; and responsibility for payment of non-covered services.     Mode of Communication:  Video Conference via Zoom    As the provider I attest to compliance with applicable laws and regulations related to telemedicine.               Patient Participation / Response:  Fully participated with the group by sharing personal reflections /  insights and openly received / provided feedback with other participants.    Demonstrated understanding of topics discussed through group discussion and participation, Expressed understanding of the relationship between behaviors, thoughts, and feelings and Demonstrated knowledge of personal thought patterns and how they impact their mood and behavior.    Treatment Plan:  Patient has a current master individualized treatment plan.  See Epic treatment plan for more information.    Morenita Lau, LASHELLC

## 2021-02-27 ASSESSMENT — ANXIETY QUESTIONNAIRES: GAD7 TOTAL SCORE: 16

## 2021-02-28 ENCOUNTER — MYC REFILL (OUTPATIENT)
Dept: FAMILY MEDICINE | Facility: CLINIC | Age: 46
End: 2021-02-28

## 2021-02-28 DIAGNOSIS — E78.5 HYPERLIPIDEMIA LDL GOAL <100: ICD-10-CM

## 2021-03-01 NOTE — TELEPHONE ENCOUNTER
[facility] last week but got a fax back today stating pts plan not active. Located his new insurance and submitted PA but received a message that this authorization is already on file.

## 2021-03-02 ENCOUNTER — HOSPITAL ENCOUNTER (OUTPATIENT)
Dept: BEHAVIORAL HEALTH | Facility: CLINIC | Age: 46
End: 2021-03-02
Attending: PSYCHIATRY & NEUROLOGY
Payer: MEDICARE

## 2021-03-02 VITALS — HEIGHT: 78 IN | BODY MASS INDEX: 36.45 KG/M2 | WEIGHT: 315 LBS

## 2021-03-02 PROCEDURE — 90853 GROUP PSYCHOTHERAPY: CPT | Mod: GT | Performed by: SOCIAL WORKER

## 2021-03-02 PROCEDURE — 90853 GROUP PSYCHOTHERAPY: CPT | Mod: GT | Performed by: COUNSELOR

## 2021-03-02 PROCEDURE — 90853 GROUP PSYCHOTHERAPY: CPT | Mod: PO | Performed by: COUNSELOR

## 2021-03-02 RX ORDER — ROSUVASTATIN CALCIUM 40 MG/1
40 TABLET, COATED ORAL DAILY
Qty: 90 TABLET | Refills: 2 | Status: SHIPPED | OUTPATIENT
Start: 2021-03-02 | End: 2021-11-29

## 2021-03-02 ASSESSMENT — MIFFLIN-ST. JEOR: SCORE: 2568.49

## 2021-03-02 NOTE — PROGRESS NOTES
Tito is a 45 year old who is being evaluated via a billable video visit.      How would you like to obtain your AVS? MyChart  If the video visit is dropped, the invitation should be resent by: Text to cell phone: [phone]  Will anyone else be joining your video visit? No    Discussion today about both RBD and LLOYD    RBD: Now on clonazepam 0.5mg (prescribed my psychiatrist as also taking during the daytime to help with anxiety) and melatonin 9mg.  Doing well minimal dream enactment and describes no significant side effects or morning grogginess.  Discussed his risks of RBD and the relationship between RBD and PD and related conditions.  Of note he is on cymbalta and suspects that this agent has worsened his dream enactment over the years.  Would not want to stop at this time as it is helping however with his mood and his MANUELITO is under control.  He indicates that he feels his olfaction is fine, he has some constipation and some orthostasis (has been diagnosed with POTS).  Discussed the NAPS trial.  He is interested in enrolling.  I will refer him to Chris Celis our research coordinator.     LLOYD: Doing very well on CPAP.  Uses every night, > 8-9 hours a night (in fact if anything I suggested he try to limit its use to 9 hours and consitently wake up at the same time-he indicated he will try). AHI is < 1, minimal air leak.  Highly motivated to continue to use.  Will continue to follow up with [facility].     All questions were answered.    It is a great privilege being asked to participate in this patients care.  The patient has been advised on the importance in of never operating operating a motor vehicle while tired or sleepy.        I visited with the patient directly but also extensively reviewed chart and coordinated care. Total time spent in the care of this patient today was 45 minutes.    Video Start Time: 0931  Video-Visit Details    Type of service:  Video Visit    Video End Time:0955    Originating Location (pt.  Location): Home    Distant Location (provider location):  [facility]     Platform used for Video Visit: Sophia

## 2021-03-02 NOTE — GROUP NOTE
Psychotherapy Group Note    PATIENT'S NAME: Joel Pineda  MRN:   [id number]  :   1975  ACCT. NUMBER: [id number]  DATE OF SERVICE: 3/02/21  START TIME: 10:00 AM  END TIME: 10:50 AM  FACILITATOR: Lenore French LPCC  TOPIC: MH EBP Group: Specialty [facility] Adult Mental Health Day Treatment  TRACK: 6A    NUMBER OF PARTICIPANTS: 8    Telemedicine Visit: The patient's condition can be safely assessed and treated via synchronous audio and visual telemedicine encounter.      Reason for Telemedicine Visit: Services only offered telehealth    Originating Site (Patient Location): Patient's home    Distant Site (Provider Location): Provider Remote Setting    Consent:  The patient/guardian has verbally consented to: the potential risks and benefits of telemedicine (video visit) versus in person care; bill my insurance or make self-payment for services provided; and responsibility for payment of non-covered services.     Mode of Communication:  Video Conference via [employer]    As the provider I attest to compliance with applicable laws and regulations related to telemedicine.      Summary of Group / Topics Discussed:  Specialty Topics: Autobiography: This topic provides each patient with an opportunity to share his/her life story by including background information, significant events that have been life changing, and a means to talk about how mental health and substance abuse has impacted overall functioning and development.      Patient Session Goals / Objectives:    Patient  had opportunity his/her personal story and give some background on their past and/or listen to another patient share their personal story    Identified ways that mental illness and substance use has impacted functioning and development    Examined their lives with and without the impact of substances /mental illness        Patient Participation / Response:  Fully participated with the group by sharing personal  reflections / insights and openly received / provided feedback with other participants.    Demonstrated understanding of topics discussed through group discussion and participation, Identified / Expressed readiness to act on skill suggestions discussed in topic and Verbalized understanding of ways to proactively manage illness    Treatment Plan:  Patient has a current master individualized treatment plan.  See Epic treatment plan for more information.    Lenore French, [facility]

## 2021-03-02 NOTE — GROUP NOTE
"Process Group Note    PATIENT'S NAME: Joel Pineda  MRN:   [id number]  :   1975  ACCT. NUMBER: [id number]  DATE OF SERVICE: 3/02/21  START TIME:  9:00 AM  END TIME:  9:50 AM  FACILITATOR: Lenore French [facility]  TOPIC:  Process Group    Diagnoses:  296.33 (F33.2) Major Depressive Disorder, Recurrent Episode, Severe _.  4. Other Diagnoses that is relevant to services:   300.00 (F41.9) Unspecified Anxiety Disorder  301.83 (F60.3) Borderline Personality Disorder.      [facility] Mental Health Day Treatment  TRACK: 1    NUMBER OF PARTICIPANTS: 6    Telemedicine Visit: The patient's condition can be safely assessed and treated via synchronous audio and visual telemedicine encounter.      Reason for Telemedicine Visit: Services only offered telehealth    Originating Site (Patient Location): Patient's home    Distant Site (Provider Location): Provider Remote Setting    Consent:  The patient/guardian has verbally consented to: the potential risks and benefits of telemedicine (video visit) versus in person care; bill my insurance or make self-payment for services provided; and responsibility for payment of non-covered services.     Mode of Communication:  Video Conference via Cloudant    As the provider I attest to compliance with applicable laws and regulations related to telemedicine.            Data:    Session content: At the start of this group, patients were invited to check in by identifying themselves, describing their current emotional status, and identifying issues to address in this group.   Area(s) of treatment focus addressed in this session included Symptom Management and Personal Safety.    Tito reported feeling \"the same as usual today.\"  His goal is to work on his social security paperwork and also \"keep a level head\" about the NONA issues she has been dealing with.  Client declined additional process time but contributed to group discussion and provided feedback and support to " peers.       Therapeutic Interventions/Treatment Strategies:  Psychotherapist offered support, feedback and validation.    Assessment:    Patient response:   Patient responded to session by accepting feedback, giving feedback and listening    Possible barriers to participation / learning include: and no barriers identified    Health Issues:   None reported       Substance Use Review:   Substance Use: No active concerns identified.    Mental Status/Behavioral Observations  Appearance:   Appropriate   Eye Contact:   Good   Psychomotor Behavior: Normal   Attitude:   Cooperative   Orientation:   All  Speech   Rate / Production: Normal    Volume:  Normal   Mood:    Depressed   Affect:    Appropriate   Thought Content:   Clear  Thought Form:  Coherent  Logical     Insight:    Good     Plan:     Safety Plan: No current safety concerns identified.  Recommended that patient call 911 or go to the local ED should there be a change in any of these risk factors.     Barriers to treatment: None identified    Patient Contracts (see media tab):  None    Substance Use: Not addressed in session     Continue or Discharge: Patient will continue in Adult Day Treatment (ADT)  as planned. Patient is likely to benefit from learning and using skills as they work toward the goals identified in their treatment plan.      Lenore French, [facility]  March 2, 2021

## 2021-03-02 NOTE — PATIENT INSTRUCTIONS
Your BMI is Body mass index is 39.76 kg/m .  Weight management is a personal decision.  If you are interested in exploring weight loss strategies, the following discussion covers the approaches that may be successful. Body mass index (BMI) is one way to tell whether you are at a healthy weight, overweight, or obese. It measures your weight in relation to your height.  A BMI of 18.5 to 24.9 is in the healthy range. A person with a BMI of 25 to 29.9 is considered overweight, and someone with a BMI of 30 or greater is considered obese. More than two-thirds of American adults are considered overweight or obese.  Being overweight or obese increases the risk for further weight gain. Excess weight may lead to heart disease and diabetes.  Creating and following plans for healthy eating and physical activity may help you improve your health.  Weight control is part of healthy lifestyle and includes exercise, emotional health, and healthy eating habits. Careful eating habits lifelong are the mainstay of weight control. Though there are significant health benefits from weight loss, long-term weight loss with diet alone may be very difficult to achieve- studies show long-term success with dietary management in less than 10% of people. Attaining a healthy weight may be especially difficult to achieve in those with severe obesity. In some cases, medications, devices and surgical management might be considered.  What can you do?  If you are overweight or obese and are interested in methods for weight loss, you should discuss this with your provider.     Consider reducing daily calorie intake by 500 calories.     Keep a food journal.     Avoiding skipping meals, consider cutting portions instead.    Diet combined with exercise helps maintain muscle while optimizing fat loss. Strength training is particularly important for building and maintaining muscle mass. Exercise helps reduce stress, increase energy, and improves fitness.  Increasing exercise without diet control, however, may not burn enough calories to loose weight.       Start walking three days a week 10-20 minutes at a time    Work towards walking thirty minutes five days a week     Eventually, increase the speed of your walking for 1-2 minutes at time    In addition, we recommend that you review healthy lifestyles and methods for weight loss available through the National Institutes of Health patient information sites:  http://win.niddk.nih.gov/publications/index.htm    And look into health and wellness programs that may be available through your health insurance provider, employer, local community center, or scar club.    Weight management plan: Patient was referred to their PCP to discuss a diet and exercise plan.

## 2021-03-02 NOTE — GROUP NOTE
Telemedicine Visit: The patient's condition can be safely assessed and treated via synchronous audio and visual telemedicine encounter.      Reason for Telemedicine Visit: Patient has requested telehealth visit    Originating Site (Patient Location): Patient's home    Distant Site (Provider Location): [facility] Outpatient Setting: [facility]    Consent:  The patient/guardian has verbally consented to: the potential risks and benefits of telemedicine (video visit) versus in person care; bill my insurance or make self-payment for services provided; and responsibility for payment of non-covered services.    Mode of Communication:  Video Conference via Zoom    As the provider I attest to compliance with applicable laws and regulations related to telemedicine.    Psychotherapy Group Note    PATIENT'S NAME: Joel Pineda  MRN:   [id number]  :   1975  ACCT. NUMBER: [id number]  DATE OF SERVICE: 3/02/21  START TIME: 11:00 AM  END TIME: 11:50 AM  FACILITATOR: Inez Lara LICSW  TOPIC: MH EBP Group: Coping Skills  [facility] Adult Mental Health Day Treatment  TRACK: 6A    NUMBER OF PARTICIPANTS: 7    Summary of Group / Topics Discussed:  Coping Skills: Meditation: Patients learned about meditation and explored how and when to utilize it to increase focus, reduce mental health symptoms, decrease physical tension, and improve mental well-being.  Approaches to meditation were presented as a means of increasing self-awareness. The benefits of various meditation practices were discussed, as well as barriers to utilization of this coping strategy.     Patient Session Goals / Objectives:    Understand the purpose and efficacy of using meditation modalities to reduce stress / symptoms.    Review / discuss situations in daily life that cause distress, where establishing a meditation routine or meditating as needed may improve functioning.      Verbalize understanding of how and when to apply grounding  strategies to reduce distress and increase presence in the moment.    Practice meditation and address barriers to use in daily life.    Create a personal relaxation scene using the 5 senses.        Patient Participation / Response:  Fully participated with the group by sharing personal reflections / insights and openly received / provided feedback with other participants.    Demonstrated understanding of topics discussed through group discussion and participation    Treatment Plan:  Patient has a current master individualized treatment plan.  See Epic treatment plan for more information.    Inez Lara, MEHRANSW

## 2021-03-03 ENCOUNTER — VIRTUAL VISIT (OUTPATIENT)
Dept: SLEEP MEDICINE | Facility: CLINIC | Age: 46
End: 2021-03-03
Payer: MEDICARE

## 2021-03-03 ENCOUNTER — HOSPITAL ENCOUNTER (OUTPATIENT)
Dept: BEHAVIORAL HEALTH | Facility: CLINIC | Age: 46
End: 2021-03-03
Attending: PSYCHIATRY & NEUROLOGY
Payer: MEDICARE

## 2021-03-03 DIAGNOSIS — G47.33 OSA (OBSTRUCTIVE SLEEP APNEA): ICD-10-CM

## 2021-03-03 PROCEDURE — 90853 GROUP PSYCHOTHERAPY: CPT | Mod: GT | Performed by: MARRIAGE & FAMILY THERAPIST

## 2021-03-03 PROCEDURE — 90853 GROUP PSYCHOTHERAPY: CPT | Mod: GT | Performed by: COUNSELOR

## 2021-03-03 PROCEDURE — 99215 OFFICE O/P EST HI 40 MIN: CPT | Mod: 95 | Performed by: PSYCHIATRY & NEUROLOGY

## 2021-03-03 NOTE — GROUP NOTE
"Process Group Note    PATIENT'S NAME: Joel Pineda  MRN:   [id number]  :   1975  ACCT. NUMBER: [id number]  DATE OF SERVICE: 3/03/21  START TIME: 10:00 AM  END TIME: 10:50 AM  FACILITATOR: Lenore French [facility]  TOPIC:  Process Group    Diagnoses:  296.33 (F33.2) Major Depressive Disorder, Recurrent Episode, Severe _.  4. Other Diagnoses that is relevant to services:   300.00 (F41.9) Unspecified Anxiety Disorder  301.83 (F60.3) Borderline Personality Disorder.      [facility] Mental Health Day Treatment  TRACK: 6A    NUMBER OF PARTICIPANTS: 7    Telemedicine Visit: The patient's condition can be safely assessed and treated via synchronous audio and visual telemedicine encounter.      Reason for Telemedicine Visit: Services only offered telehealth    Originating Site (Patient Location): Patient's home    Distant Site (Provider Location): Provider Remote Setting    Consent:  The patient/guardian has verbally consented to: the potential risks and benefits of telemedicine (video visit) versus in person care; bill my insurance or make self-payment for services provided; and responsibility for payment of non-covered services.     Mode of Communication:  Video Conference via MyC[employer]    As the provider I attest to compliance with applicable laws and regulations related to telemedicine.            Data:    Session content: At the start of this group, patients were invited to check in by identifying themselves, describing their current emotional status, and identifying issues to address in this group.   Area(s) of treatment focus addressed in this session included Symptom Management and Personal Safety.    Tito reported feeling \"hopeful\" today.  His goal is to get his luandry put away  He reported being excited that he was enrolled into a REM sleep disorder study.  Client declined additional process time but contributed to group discussion and provided feedback and support to " peers.      Therapeutic Interventions/Treatment Strategies:  Psychotherapist offered support, feedback and validation.    Assessment:    Patient response:   Patient responded to session by accepting feedback, giving feedback and listening    Possible barriers to participation / learning include: and no barriers identified    Health Issues:   Yes: Pain, Associated Psychological Distress       Substance Use Review:   Substance Use: Substance use has decreased    Mental Status/Behavioral Observations  Appearance:   Appropriate   Eye Contact:   Good   Psychomotor Behavior: Normal   Attitude:   Cooperative   Orientation:   All  Speech   Rate / Production: Normal    Volume:  Normal   Mood:    Normal  Affect:    Appropriate   Thought Content:   Clear  Thought Form:  Coherent  Logical     Insight:    Good     Plan:     Safety Plan: No current safety concerns identified.  Recommended that patient call 911 or go to the local ED should there be a change in any of these risk factors.     Barriers to treatment: None identified    Patient Contracts (see media tab):  None    Substance Use: Provided encouragement towards sobriety    Provided support and affirmation for steps taken towards sobriety      Continue or Discharge: Patient will continue in Adult Day Treatment (ADT)  as planned. Patient is likely to benefit from learning and using skills as they work toward the goals identified in their treatment plan.      Lenore French, [facility]  March 3, 2021

## 2021-03-03 NOTE — TELEPHONE ENCOUNTER
Received this back when I submitted PA to pts [employer] part D that became effective on 3/1/2021. Faxed this into [employer] again per pt request since they are looking for a prior auth to be done.

## 2021-03-03 NOTE — GROUP NOTE
Psychotherapy Group Note    PATIENT'S NAME: Joel Pineda  MRN:   [id number]  :   1975  ACCT. NUMBER: [id number]  DATE OF SERVICE: 3/03/21  START TIME: 11:00 AM  END TIME: 11:50 AM  FACILITATOR: Greg Alexis LMFT  TOPIC: MH EBP Group: Coping Skills  [facility] Adult Mental Health Day Treatment  TRACK: 6A    NUMBER OF PARTICIPANTS: 7    Telemedicine Visit: The patient's condition can be safely assessed and treated via synchronous audio and visual telemedicine encounter.      Reason for Telemedicine Visit: Services only offered telehealth    Originating Site (Patient Location): Patient's home    Distant Site (Provider Location): Provider Remote Setting    Consent:  The patient/guardian has verbally consented to: the potential risks and benefits of telemedicine (video visit) versus in person care; bill my insurance or make self-payment for services provided; and responsibility for payment of non-covered services.     Mode of Communication:  Video Conference via [employer]Xstream    As the provider I attest to compliance with applicable laws and regulations related to telemedicine.     Summary of Group / Topics Discussed:  Coping Skills: Building Positive Experiences: Patients discussed the importance of planning and engaging in positive experiences, as strategies to increase positive thinking, hope, and self-worth.  Explored the benefits of planning / creating positive experiences, including recognizing and reducing negativity bias by focusing on and building positive experiences.   Several approaches to building positive experiences were presented and discussed relevant to each patient.      Patient Session Goals / Objectives:    Understand the purpose of planning / creating / participating / sharing in positive experiences.    Explore patient s experiences related to negative thinking and how it influences activities and moodIdentify current positive events in patient s life.     Set goals to increase  a variety of positive experiences.    Address barriers to planning / engaging in positive experiences.        Patient Participation / Response:  Moderately participated, sharing some personal reflections / insights and adequately adequately received / provided feedback with other participants.    Demonstrated understanding of topics discussed through group discussion and participation and Expressed understanding of the relevance / importance of coping skills at distressing times in life    Treatment Plan:  Patient has a current master individualized treatment plan.  See Epic treatment plan for more information.    Mario Alexis LMFT

## 2021-03-04 PROBLEM — M79.671 RIGHT FOOT PAIN: Status: RESOLVED | Noted: 2020-12-17 | Resolved: 2021-03-04

## 2021-03-04 NOTE — PROGRESS NOTES
Patient did not return for further treatment and no additional progress was noted.  Please refer to the progress note and goal flowsheet completed on 02/02/21 for discharge information.

## 2021-03-05 ENCOUNTER — HOSPITAL ENCOUNTER (OUTPATIENT)
Dept: BEHAVIORAL HEALTH | Facility: CLINIC | Age: 46
End: 2021-03-05
Attending: PSYCHIATRY & NEUROLOGY
Payer: MEDICARE

## 2021-03-05 PROCEDURE — 90853 GROUP PSYCHOTHERAPY: CPT | Mod: PO

## 2021-03-05 PROCEDURE — 90853 GROUP PSYCHOTHERAPY: CPT | Mod: GT | Performed by: SOCIAL WORKER

## 2021-03-05 PROCEDURE — 90853 GROUP PSYCHOTHERAPY: CPT | Mod: PO | Performed by: COUNSELOR

## 2021-03-05 NOTE — GROUP NOTE
"Process Group Note    PATIENT'S NAME: Joel Pineda  MRN:   [id number]  :   1975  ACCT. NUMBER: [id number]  DATE OF SERVICE: 3/05/21  START TIME:  9:00 AM  END TIME:  9:50 AM  FACILITATOR: Lenore French [facility]  TOPIC:  Process Group    Diagnoses:  296.33 (F33.2) Major Depressive Disorder, Recurrent Episode, Severe _.  4. Other Diagnoses that is relevant to services:   300.00 (F41.9) Unspecified Anxiety Disorder  301.83 (F60.3) Borderline Personality Disorder.      [facility] Mental Health Day Treatment  TRACK: 6A    NUMBER OF PARTICIPANTS: 7    Telemedicine Visit: The patient's condition can be safely assessed and treated via synchronous audio and visual telemedicine encounter.      Reason for Telemedicine Visit: Services only offered telehealth    Originating Site (Patient Location): Patient's home    Distant Site (Provider Location): Provider Remote Setting    Consent:  The patient/guardian has verbally consented to: the potential risks and benefits of telemedicine (video visit) versus in person care; bill my insurance or make self-payment for services provided; and responsibility for payment of non-covered services.     Mode of Communication:  Video Conference via [employer]    As the provider I attest to compliance with applicable laws and regulations related to telemedicine.            Data:    Session content: At the start of this group, patients were invited to check in by identifying themselves, describing their current emotional status, and identifying issues to address in this group.   Area(s) of treatment focus addressed in this session included Symptom Management and Personal Safety.    Tito reported feeling \"a mixed bad\" of emotions today because it's his last day.  His goal is to cook himself a nice dinner today to celebrate graduating the program.  Client declined additional process time but contributed to group discussion and provided feedback and support to " peers.      Therapeutic Interventions/Treatment Strategies:  Psychotherapist offered support, feedback and validation.    Assessment:    Patient response:   Patient responded to session by accepting feedback, giving feedback and listening    Possible barriers to participation / learning include: and no barriers identified    Health Issues:   Yes: Pain, Associated Psychological Distress       Substance Use Review:   Substance Use: No active concerns identified.    Mental Status/Behavioral Observations  Appearance:   Appropriate   Eye Contact:   Good   Psychomotor Behavior: Normal   Attitude:   Cooperative   Orientation:   All  Speech   Rate / Production: Normal    Volume:  Normal   Mood:    Depressed   Affect:    Appropriate   Thought Content:   Clear  Thought Form:  Coherent  Logical     Insight:    Good     Plan:     Safety Plan: No current safety concerns identified.  Recommended that patient call 911 or go to the local ED should there be a change in any of these risk factors.     Barriers to treatment: None identified    Patient Contracts (see media tab):  None    Substance Use: Not addressed in session     Continue or Discharge: Patient will continue in Adult Day Treatment (ADT)  as planned. Patient is likely to benefit from learning and using skills as they work toward the goals identified in their treatment plan.      Lenore French, [facility]  March 5, 2021

## 2021-03-05 NOTE — PROGRESS NOTES
Adult Mental Health Intensive Outpatient Discharge Summary/Instructions      Patient: Joel Pineda MRN: [id number]   : 1975 Age: 45 year old Sex: male     Admission Date: 21  Discharge Date: 3/5/21  Diagnosis:   296.33 (F33.2) Major Depressive Disorder, Recurrent Episode, Severe _.  4. Other Diagnoses that is relevant to services:   300.00 (F41.9) Unspecified Anxiety Disorder  301.83 (F60.3) Borderline Personality Disorder.      Focus of Treatment / Progress You have completed the Adult Day Treatment IOP.  Recommendations are to transition to the clinic group on  from 1-3pm.  Please also continued to see your psychiatrist and medical providers.      Personal Safety:      * Follow your safety plan     * Call crisis lines as needed:    [facility] [phone] [facility] [phone]  [facility] [phone] Crisis Connection [phone]  [facility] [phone] [facility] [phone]  [facility] [phone] National Suicide Prevention [phone]  [facility] [phone] Suicide Prevention [phone]  [facility] [phone]    Managing symptoms of: anxiety, depression, SI    Community support/health:  [facility]    Managing Symptoms and Preventing Relapse    * Go to all of your appointments    * Take all medications as directed.      * Carry a current list if medication with you    * Do not use illicit (street) drugs.  Avoid alcohol    * Report these symptoms to your care team. These are early signs of relapse:   Thoughts of suicide   Losing more sleep   Increased confusion   Mood getting worse   Feeling more aggressive   Other:  Substance use    *Use these skills daily:  Mindfulness, breathing, etc.    Copy of summary sent to: sent to patient via [employer]    Follow up with psychiatrist / main caregiver: Dr. CHENEY at [facility]   Next visit:     Follow up with your therapist: SANJUANA  Next visit: TBD    Go to group therapy and / or support groups at:  [facility]    See your medical doctor about:  Any physical health concerns    Other:  N/A    Client Signature:__unavailable to sign due to COVID-19_____________________  Date / Time:___________  Staff Signature:___JUDSON Grider on 3/5/2021 at 12:33 PM_____________________   Date / Time:___________

## 2021-03-05 NOTE — GROUP NOTE
Telemedicine Visit: The patient's condition can be safely assessed and treated via synchronous audio and visual telemedicine encounter.      Reason for Telemedicine Visit: Patient has requested telehealth visit    Originating Site (Patient Location): Patient's home    Distant Site (Provider Location): [facility] Outpatient Setting:  [facility]    Consent:  The patient/guardian has verbally consented to: the potential risks and benefits of telemedicine (video visit) versus in person care; bill my insurance or make self-payment for services provided; and responsibility for payment of non-covered services.    Mode of Communication:  Video Conference via Zoom    As the provider I attest to compliance with applicable laws and regulations related to telemedicine.    Psychoeducation Group Note    PATIENT'S NAME: Joel Pineda  MRN:   [id number]  :   1975  ACCT. NUMBER: [id number]  DATE OF SERVICE: 3/05/21  START TIME: 10:00 AM  END TIME: 10:50 AM  FACILITATOR: Inez Lara LICSW  TOPIC: MH EBP Group: Health Maintenance  [facility] Program  TRACK: 6A    NUMBER OF PARTICIPANTS: 7    Summary of Group / Topics Discussed:  Health Maintenance: Eight Dimensions of Wellness: The concept of holistic health through the model of eight dimensions was introduced. Group members participated in identifying behaviors and activities in each of the dimensions of wellness.  The importance of each dimension was reinforced and the concept of balance in life as it relates to wellness was explored.      Patient Session Goals / Objectives:    Verbalized understanding of balance in wellness and how it relates to their life    Identified and explained the eight dimensions of wellness    Categorized activities and wellness needs into corresponding dimensions appropriately during exercise          Patient Participation / Response:  Fully participated with the group by sharing personal reflections / insights and openly  received / provided feedback with other participants.    Demonstrated understanding of topics discussed through group discussion and participation and Verbalized understanding of health maintenance topic    Treatment Plan:  Patient has a current master individualized treatment plan.  See Epic treatment plan for more information.    Inez Lara, LICSW

## 2021-03-05 NOTE — GROUP NOTE
Telemedicine Visit: The patient's condition can be safely assessed and treated via synchronous audio and visual telemedicine encounter.      Reason for Telemedicine Visit: Services only offered telehealth    Originating Site (Patient Location): Patient's home    Distant Site (Provider Location): Provider Remote Setting    Consent:  The patient/guardian has verbally consented to: the potential risks and benefits of telemedicine (video visit) versus in person care; bill my insurance or make self-payment for services provided; and responsibility for payment of non-covered services.     Mode of Communication:  Video Conference via [employer]    As the provider I attest to compliance with applicable laws and regulations related to telemedicine.  Psychotherapy Group Note    PATIENT'S NAME: Joel Pineda  MRN:   [id number]  :   1975  ACCT. NUMBER: [id number]  DATE OF SERVICE: 3/05/21  START TIME: 11:00 AM  END TIME: 11:50 AM  FACILITATOR: Sheri Nettles LMFT  TOPIC:  EBP Group: Cognitive Restructuring  [facility] Adult Mental Health Day Treatment  TRACK: 6a    NUMBER OF PARTICIPANTS: 7    Summary of Group / Topics Discussed:  Cognitive Restructuring: Core Beliefs: Patients received an overview of what a core belief is, and how they develop. Patients then began to identify their negative core beliefs. Patients worked to modify core beliefs with the goal of improved self-image and functioning.     Patient Session Goals / Objectives:    Familiarize self with the concept of core beliefs and how they develop.      Explore personal core beliefs (positive and negative)    Develop / advance recognition of the connection between negative thoughts and negative core beliefs.    Formulate new neutral/positive core beliefs               Patient Participation / Response:  Fully participated with the group by sharing personal reflections / insights and openly received / provided feedback with other  participants.    Demonstrated understanding of topics discussed through group discussion and participation, Demonstrated knowledge of personal thought patterns and how they impact their mood and behavior., Identified / Expressed personal readiness to practice CBT and Practiced skills in session    Treatment Plan:  Patient has See Epic Treatment Plan - Patient is discharging.    DENZEL Mayo

## 2021-03-07 ENCOUNTER — MYC REFILL (OUTPATIENT)
Dept: FAMILY MEDICINE | Facility: CLINIC | Age: 46
End: 2021-03-07

## 2021-03-07 DIAGNOSIS — M45.8 ANKYLOSING SPONDYLITIS OF SACRAL REGION (H): ICD-10-CM

## 2021-03-07 DIAGNOSIS — M51.369 DDD (DEGENERATIVE DISC DISEASE), LUMBAR: ICD-10-CM

## 2021-03-08 ENCOUNTER — TELEPHONE (OUTPATIENT)
Dept: RHEUMATOLOGY | Facility: CLINIC | Age: 46
End: 2021-03-08

## 2021-03-08 NOTE — TELEPHONE ENCOUNTER
PA Initiation    Medication: [employer] PA submission  Insurance Company: [employer] - Phone [phone] Fax [phone]  Pharmacy Filling the Rx: [facility] - [place] [address]  Filling Pharmacy Phone:    Filling Pharmacy Fax:    Start Date: 3/8/2021    Received a PA paper request from [employer]. Submitted urgently to plan. Pt was being given run around with [employer] stating it didn't need a PA but th en [employer] would tell [employer] that it did need a PA. When I tried to submit one it said that an authorization was already on file.

## 2021-03-09 RX ORDER — OXYCODONE HYDROCHLORIDE 5 MG/1
5-10 TABLET ORAL EVERY 6 HOURS PRN
Qty: 35 TABLET | Refills: 0 | Status: SHIPPED | OUTPATIENT
Start: 2021-03-09 | End: 2021-03-26

## 2021-03-10 ENCOUNTER — HOSPITAL ENCOUNTER (OUTPATIENT)
Dept: BEHAVIORAL HEALTH | Facility: CLINIC | Age: 46
End: 2021-03-10
Attending: PSYCHIATRY & NEUROLOGY
Payer: MEDICARE

## 2021-03-10 ENCOUNTER — ALLIED HEALTH/NURSE VISIT (OUTPATIENT)
Dept: NURSING | Facility: CLINIC | Age: 46
End: 2021-03-10
Payer: MEDICARE

## 2021-03-10 DIAGNOSIS — Z23 NEED FOR VACCINATION: Primary | ICD-10-CM

## 2021-03-10 PROCEDURE — G0010 ADMIN HEPATITIS B VACCINE: HCPCS

## 2021-03-10 PROCEDURE — 90853 GROUP PSYCHOTHERAPY: CPT | Mod: PO

## 2021-03-10 PROCEDURE — 99207 PR NO CHARGE NURSE ONLY: CPT

## 2021-03-10 PROCEDURE — 90746 HEPB VACCINE 3 DOSE ADULT IM: CPT

## 2021-03-10 NOTE — NURSING NOTE
Prior to immunization administration, verified patients identity using patient s name and date of birth. Please see Immunization Activity for additional information.     Screening Questionnaire for Adult Immunization    Are you sick today?   No   Do you have allergies to medications, food, a vaccine component or latex?   Yes   Have you ever had a serious reaction after receiving a vaccination?   No   Do you have a long-term health problem with heart, lung, kidney, or metabolic disease (e.g., diabetes), asthma, a blood disorder, no spleen, complement component deficiency, a cochlear implant, or a spinal fluid leak?  Are you on long-term aspirin therapy?   Yes   Do you have cancer, leukemia, HIV/AIDS, or any other immune system problem?   Yes   Do you have a parent, brother, or sister with an immune system problem?   No   In the past 3 months, have you taken medications that affect  your immune system, such as prednisone, other steroids, or anticancer drugs; drugs for the treatment of rheumatoid arthritis, Crohn s disease, or psoriasis; or have you had radiation treatments?   Yes   Have you had a seizure, or a brain or other nervous system problem?   No   During the past year, have you received a transfusion of blood or blood    products, or been given immune (gamma) globulin or antiviral drug?   No   For women: Are you pregnant or is there a chance you could become       pregnant during the next month?   No   Have you received any vaccinations in the past 4 weeks?   No       Per orders of Dr. Lyles, injection of hep B given by Riana Whiteside MA. Patient instructed to remain in clinic for 15 minutes afterwards, and to report any adverse reaction to me immediately.       Screening performed by Riana Whiteside MA on 3/10/2021 at 9:16 AM.

## 2021-03-10 NOTE — GROUP NOTE
Process Group Note    PATIENT'S NAME: Joel Pineda  MRN:   [id number]  :   1975  ACCT. NUMBER: [id number]  DATE OF SERVICE: 3/10/21  START TIME:  1:00 PM  END TIME: 2:50 pm  FACILITATOR: Lashell Leonard  TOPIC:  Process Group    Diagnoses:  296.33 (F33.2) Major Depressive Disorder, Recurrent Episode, Severe _.  4. Other Diagnoses that is relevant to services:   300.00 (F41.9) Unspecified Anxiety Disorder  301.83 (F60.3) Borderline Personality Disorder.      [facility] Mental Health Day Treatment  TRACK: Clinic 1    Telemedicine Visit: The patient's condition can be safely assessed and treated via synchronous audio and visual telemedicine encounter.      Reason for Telemedicine Visit:  covid 19    Originating Site (Patient Location): Patient's home    Distant Site (Provider Location): Provider Remote Setting    Consent:  The patient/guardian has verbally consented to: the potential risks and benefits of telemedicine (video visit) versus in person care; bill my insurance or make self-payment for services provided; and responsibility for payment of non-covered services.     Mode of Communication:  Video Conference via [employer]    As the provider I attest to compliance with applicable laws and regulations related to telemedicine.      NUMBER OF PARTICIPANTS: 7          Data:    Session content: At the start of this group, patients were invited to check in by identifying themselves, describing their current emotional status, and identifying issues to address in this group.   Area(s) of treatment focus addressed in this session included Symptom Management, Personal Safety, Develop / Improve Independent Living Skills and Develop Socialization / Interpersonal Relationship Skills.  Tito reported feeling mixed emotions.  He reported making decision to euthanize cat tomorrow after how much pain she has been in. He reported feeling of grief.  He also reported feeling of interest in stray cat that  is hanging around his house and appears to be pregnant.  He reported trying to distract by watching concerts.  He was open to idea of being gentle with self and whatever feelings he is having at this time.      Therapeutic Interventions/Treatment Strategies:  Psychotherapist offered support, feedback and validation and reinforced use of skills. Treatment modalities used include Motivational Interviewing, Cognitive Behavioral Therapy and Dialectical Behavioral Therapy. Validated and normalized.  Highlighted and reinforced skills used; connected to positive outcomes.  Provided additional skill suggestions.  Aided in creating a plan to help work towards goals.      Psychoeducation topic was on rigid, porous, and healthy boundaries.  Client participated actively and effectively.   Assessment:    Patient response:   Patient responded to session by accepting feedback, giving feedback, listening, focusing on goals, being attentive and accepting support    Possible barriers to participation / learning include: and no barriers identified    Health Issues:   None reported       Substance Use Review:   Substance Use: No active concerns identified.    Mental Status/Behavioral Observations  Appearance:   Appropriate   Eye Contact:   Good   Psychomotor Behavior: Normal   Attitude:   Cooperative   Orientation:   All  Speech   Rate / Production: Normal    Volume:  Normal   Mood:    Depressed  Grieving  Affect:    Appropriate   Thought Content:   Rumination  Thought Form:  Coherent  Logical     Insight:    Good     Plan:     Safety Plan: No current safety concerns identified.  Recommended that patient call 911 or go to the local ED should there be a change in any of these risk factors.     Barriers to treatment: None identified    Patient Contracts (see media tab):  None    Substance Use: Not addressed in session     Continue or Discharge: Patient will continue in Adult Day Treatment (ADT)  as planned. Patient is likely to benefit  from learning and using skills as they work toward the goals identified in their treatment plan.      Lashell Leonard  March 10, 2021

## 2021-03-12 NOTE — TELEPHONE ENCOUNTER
PA was approved.PA approval letter was faxed to gen earlier this week and we just received [facility] approval

## 2021-03-15 ENCOUNTER — OFFICE VISIT (OUTPATIENT)
Dept: FAMILY MEDICINE | Facility: CLINIC | Age: 46
End: 2021-03-15
Payer: MEDICARE

## 2021-03-15 ENCOUNTER — VIRTUAL VISIT (OUTPATIENT)
Dept: PALLIATIVE MEDICINE | Facility: CLINIC | Age: 46
End: 2021-03-15
Payer: MEDICARE

## 2021-03-15 VITALS
WEIGHT: 315 LBS | HEART RATE: 78 BPM | BODY MASS INDEX: 36.45 KG/M2 | DIASTOLIC BLOOD PRESSURE: 82 MMHG | TEMPERATURE: 97.8 F | SYSTOLIC BLOOD PRESSURE: 132 MMHG | HEIGHT: 78 IN | RESPIRATION RATE: 16 BRPM | OXYGEN SATURATION: 98 %

## 2021-03-15 DIAGNOSIS — M45.8 ANKYLOSING SPONDYLITIS OF SACRAL REGION (H): Primary | ICD-10-CM

## 2021-03-15 DIAGNOSIS — N48.89 SORE OF PENIS: Primary | ICD-10-CM

## 2021-03-15 DIAGNOSIS — B37.49 CANDIDIASIS OF SCROTUM: ICD-10-CM

## 2021-03-15 DIAGNOSIS — M48.061 SPINAL STENOSIS OF LUMBAR REGION WITHOUT NEUROGENIC CLAUDICATION: ICD-10-CM

## 2021-03-15 PROCEDURE — 97112 NEUROMUSCULAR REEDUCATION: CPT | Mod: GP | Performed by: PHYSICAL THERAPIST

## 2021-03-15 PROCEDURE — 87529 HSV DNA AMP PROBE: CPT | Performed by: FAMILY MEDICINE

## 2021-03-15 PROCEDURE — 97110 THERAPEUTIC EXERCISES: CPT | Mod: GP | Performed by: PHYSICAL THERAPIST

## 2021-03-15 PROCEDURE — 99214 OFFICE O/P EST MOD 30 MIN: CPT | Performed by: FAMILY MEDICINE

## 2021-03-15 RX ORDER — FLUCONAZOLE 200 MG/1
200 TABLET ORAL DAILY
Qty: 3 TABLET | Refills: 0 | Status: SHIPPED | OUTPATIENT
Start: 2021-03-15 | End: 2021-03-23

## 2021-03-15 ASSESSMENT — MIFFLIN-ST. JEOR: SCORE: 2565.45

## 2021-03-15 ASSESSMENT — PAIN SCALES - GENERAL: PAINLEVEL: MODERATE PAIN (4)

## 2021-03-15 NOTE — PROGRESS NOTES
"Patient Name: Joel Pineda      YOB: 1975     Medical Record Number: [id number]  Diagnosis:    Ankylosing spondylitis of sacral region (H)  Spinal stenosis of lumbar region without neurogenic claudication     The patient has been notified of the following:  \"This virtual visit will be conducted between you and your provider.  We have found that certain health care needs can be provided without the need for physical presence.  This service lets us provide the care you need with a virtual visit.\"    Due to external, as well as internal [facility] management of the COVID 19 Virus, Joel Pineda, was not seen in our clinic.  As a substitution, we implemented a virtual visit to manage this patient's condition utilizing the [employer] Touch[employer] virtual visit platform.  The provider, Evan Trevino PT, reviewed the patient's chart and spoke with the patient to determine the following telemedicine visit is appropriate and effective for the patient's care.    The following type of visit was completed:  Video Visit:  The [employer] Touch[employer] platform uses a synchronous HIPAA compliant video stream for this encounter.  Video start time:  3:03pm  Video end time:  3:46pm  Provider location:  [facility]  Patient location:  Home    Visit Info Length of Visit: 43  Arrived: On Time   Exercise/Activity Education Instruction:  Postural Training/Anatomy  Pacing/Energy Conservation  Home Program  Self Care  Activity:  Therapeutic Exercise 25':  Aerobic Conditioning (*see \"Strength/Functional Actitivities Record for details of exercises performed)  Walk and air bike motion and ed on components of exercise/activity trang aerobic #4  Stretching:  Upper Ext  Bilateral  Added stretch for thor ext  Postural Training:  standing, sitting  Ed on standing alignment for most stable, home base position - tripod feet, soft knees, neutral spine, thumbs fwd  Three principles of chronic pain PT ed/reinforce    Neuro re-ed 15':  Ed on " neutral spine in all positions  Diaphragm breathing ed with self manual cues  Ed on following sequence of 'breathe, posture, move' concept  Self care ed #3 with rec for hourly check ins   Notes:    Demonstrates/Verbalizes Technique: 2, 3 (1= poor technique-difficulty performing exercises,significant cues required; 5= good technique-performs exercises without cues)  Body Awareness: 2, 3 (1=low awareness; 5=high awareness)  Posture/Stability: 2, 3 (1= poor posture, stability; 5= good posture, stability)   Motivational Level:   Ask questions, Eager to learn and Cooperative Participation:  Full   Patient Satisfaction:  satisfied   Response to Teaching:   cooperative and needs reinforcement  Factors that affect learning: None   Plan of Care  Cont PT to support reactivation and integration of self regulation pain management skills.   Therapist: Evan Trevino, PT                 Date: 3/15/2021

## 2021-03-15 NOTE — PROGRESS NOTES
"  Assessment & Plan     Sore of penis  Symptoms and exam are not classic for HSV but possible - PCR testing today.   - Herpes Simplex Virus 1&2 PCR Swab    Candidiasis of scrotum  Has tried topical treatment.  Will treat with oral medication as noted.  Follow up if not resolved.    - fluconazole (DIFLUCAN) 200 MG tablet; Take 1 tablet (200 mg) by mouth daily             Patient Instructions   Testing of the sore today.  I will send results and recommendations when results return.    Begin Diflucan oral 1 pill daily for 3 days for the skin yeast infection.          Return in about 1 week (around 3/22/2021) for as needed for persistent symptoms.    Felipa Renteria MD  [facility] NAWAF Francis is a 45 year old who presents for the following health issues     HPI       Concern - derm concern - penis/testicles  Onset: 4-6 weeks  Description: growth on shaft of penis - client popped it-- for the past 4-5 days  Intensity: 4/10  Progression of Symptoms:  intermittent  Accompanying Signs & Symptoms: some itching/burning in scrotum/penis  Previous history of similar problem: NA  Precipitating factors:        Worsened by: NA  Alleviating factors:        Improved by: None  Therapies tried and outcome: Lotrimin- 2 tubes, Talc free powder, domeboro -dries things out, hydrocortisone, lidocaine cream  Able to drain a liquid from the shaft of penis - right lower toward tip.  Aching discomfort.    Intermittent testicle pain and perineum area.      Review of Systems   Constitutional, HEENT, cardiovascular, pulmonary, gi and gu systems are negative, except as otherwise noted.      Objective    /82   Pulse 78   Temp 97.8  F (36.6  C) (Oral)   Resp 16   Ht 1.981 m (6' 6\")   Wt (!) 155.7 kg (343 lb 4.8 oz)   SpO2 98%   BMI 39.67 kg/m    Body mass index is 39.67 kg/m .  Physical Exam   GENERAL: alert, no distress and obese  NECK: no adenopathy, no asymmetry, masses, or scars and thyroid " normal to palpation  RESP: lungs clear to auscultation - no rales, rhonchi or wheezes  CV: regular rate and rhythm, normal S1 S2, no S3 or S4, no murmur, click or rub, no peripheral edema and peripheral pulses strong  ABDOMEN: soft, nontender, no hepatosplenomegaly, no masses and bowel sounds normal   (male): testicles normal without atrophy or masses, no hernias and scrotum with mild erythema and minimal scaling noted, no open sores or skin breakdown noted.  Right side penis shaft with papule 5 mm, mildly tender, no ulceration noted.    MS: no gross musculoskeletal defects noted, no edema

## 2021-03-15 NOTE — PATIENT INSTRUCTIONS
Testing of the sore today.  I will send results and recommendations when results return.    Begin Diflucan oral 1 pill daily for 3 days for the skin yeast infection.

## 2021-03-16 LAB
HSV1 DNA SPEC QL NAA+PROBE: NOT DETECTED
HSV2 DNA SPEC QL NAA+PROBE: NOT DETECTED
LABORATORY COMMENT REPORT: NORMAL
SPECIMEN SOURCE: NORMAL

## 2021-03-16 NOTE — RESULT ENCOUNTER NOTE
The Herpes testing is negative.  I would recommend topical use of Aquaphor to the area as a barrier and protection to the skin.  This should bring comfort while the area heals.  Please call or MyChart message me if you have any questions.    JAELYN

## 2021-03-17 ENCOUNTER — HOSPITAL ENCOUNTER (OUTPATIENT)
Dept: BEHAVIORAL HEALTH | Facility: CLINIC | Age: 46
End: 2021-03-17
Attending: PSYCHIATRY & NEUROLOGY
Payer: MEDICARE

## 2021-03-17 ENCOUNTER — NURSE TRIAGE (OUTPATIENT)
Dept: FAMILY MEDICINE | Facility: CLINIC | Age: 46
End: 2021-03-17

## 2021-03-17 PROCEDURE — 90853 GROUP PSYCHOTHERAPY: CPT | Mod: GT | Performed by: SOCIAL WORKER

## 2021-03-17 NOTE — GROUP NOTE
Process Group Note    PATIENT'S NAME: Joel Pineda  MRN:   [id number]  :   1975  ACCT. NUMBER: [id number]  DATE OF SERVICE: 3/17/21  START TIME:  1:00 PM  END TIME:  2:50 PM  FACILITATOR: Magali Dodson LICSW  TOPIC:  Process Group    Diagnoses:  296.33 (F33.2) Major Depressive Disorder, Recurrent Episode, Severe _.  4. Other Diagnoses that is relevant to services:   300.00 (F41.9) Unspecified Anxiety Disorder  301.83 (F60.3) Borderline Personality Disorder.      [facility] Mental Health Day Treatment  TRACK: Clinic One    NUMBER OF PARTICIPANTS: 6    Telemedicine Visit: The patient's condition can be safely assessed and treated via synchronous audio and visual telemedicine encounter.      Reason for Telemedicine Visit: Services only offered telehealth    Originating Site (Patient Location): Patient's home    Distant Site (Provider Location): Provider Remote Setting    Consent:  The patient/guardian has verbally consented to: the potential risks and benefits of telemedicine (video visit) versus in person care; bill my insurance or make self-payment for services provided; and responsibility for payment of non-covered services.     Mode of Communication:  Video Conference via [employer]    As the provider I attest to compliance with applicable laws and regulations related to telemedicine.     Client participated in educational session on values clarification.      Data:    Session content: At the start of this group, patients were invited to check in by identifying themselves, describing their current emotional status, and identifying issues to address in this group.   Area(s) of treatment focus addressed in this session included Symptom Management, Personal Safety and Community Resources/Discharge Planning.  Tito reported sad ness at the loss of his cat whom he put down last Thursday.  He stated that his emotions became intense about the loss and he used DBT skills (TIPP) and  called the crisis line for support.  Client denied suicidal ideation, intent and plan.     Therapeutic Interventions/Treatment Strategies:  Psychotherapist offered support, feedback and validation and reinforced use of skills. Treatment modalities used include Cognitive Behavioral Therapy. Interventions include Cognitive Restructuring:  Assisted patient in formulating new neutral/positive alternatives to challenge less helpful / ineffective thoughts.    Assessment:    Patient response:   Patient responded to session by giving feedback, listening, focusing on goals and being attentive    Possible barriers to participation / learning include: and no barriers identified    Health Issues:   None reported       Substance Use Review:   Substance Use: No active concerns identified.    Mental Status/Behavioral Observations  Appearance:   Appropriate   Eye Contact:   Good   Psychomotor Behavior: Normal   Attitude:   Cooperative   Orientation:   All  Speech   Rate / Production: Normal    Volume:  Normal   Mood:    Anxious  Depressed   Affect:    Appropriate   Thought Content:   Clear  Thought Form:  Coherent  Logical     Insight:    Good     Plan:     Safety Plan: No current safety concerns identified.  Recommended that patient call 911 or go to the local ED should there be a change in any of these risk factors.     Barriers to treatment: None identified    Patient Contracts (see media tab):  None    Substance Use: Not addressed in session     Continue or Discharge: Patient will continue in Adult Day Treatment (ADT)  as planned. Patient is likely to benefit from learning and using skills as they work toward the goals identified in their treatment plan.      Magali Dodson, [facility]  March 17, 2021

## 2021-03-17 NOTE — TELEPHONE ENCOUNTER
"  Additional Information    Negative: Abdomen pain is the main symptom and adult male    Negative: Abdomen pain is the main symptom and adult female    Negative: Rectal bleeding or blood in stool is the main symptom    Negative: Patient sounds very sick or weak to the triager    Constant abdominal pain lasting > 2 hours    Answer Assessment - Initial Assessment Questions  1. STOOL PATTERN OR FREQUENCY: \"How often do you pass bowel movements (BMs)?\"  (Normal range: tid to q 3 days)  \"When was the last BM passed?\"        Patient usually has normal bowel movements. Last bowel movement this morning - was soft and formed  2. STRAINING: \"Do you have to strain to have a BM?\"       yes  3. RECTAL PAIN: \"Does your rectum hurt when the stool comes out?\" If so, ask: \"Do you have hemorrhoids? How bad is the pain?\"  (Scale 1-10; or mild, moderate, severe)      no  4. STOOL COMPOSITION: \"Are the stools hard?\"       no  5. BLOOD ON STOOLS: \"Has there been any blood on the toilet tissue or on the surface of the BM?\" If so, ask: \"When was the last time?\"       no  6. CHRONIC CONSTIPATION: \"Is this a new problem for you?\"  If no, ask: \"How long have you had this problem?\" (days, weeks, months)       This is a new problem, also diagnosed with gastroparesis in past 18 months  7. CHANGES IN DIET: \"Have there been any recent changes in your diet?\"       Stays away from high fiber foods due to diagnosis of gastroparesis  8. MEDICATIONS: \"Have you been taking any new medications?\"      No new meds  9. LAXATIVES: \"Have you been using any laxatives or enemas?\"  If yes, ask \"What, how often, and when was the last time?\"      Has tried Miralax and Senekot  10. CAUSE: \"What do you think is causing the constipation?\"         gastroparesis  11. OTHER SYMPTOMS: \"Do you have any other symptoms?\" (e.g., abdominal pain, fever, vomiting)        All over abdominal ache, abdomen is soft, feels bloated  12. PREGNANCY: \"Is there any chance you are " "pregnant?\" \"When was your last menstrual period?\"        N/A    Protocols used: CONSTIPATION-A-[place]    "

## 2021-03-18 NOTE — PROGRESS NOTES
Adult Day Treatment Program:  Individualized Treatment Plan     Date of Plan: 3/17/21    Name: Joel Pineda MRN: [id number]    : 1975    Programs:  Adult Day Treatment (ADT)     Clinical Track (if applicable):  [facility]    DSM5 Diagnosis  296.33 (F33.2) Major Depressive Disorder, Recurrent Episode, Severe _.  4. Other Diagnoses that is relevant to services:   300.00 (F41.9) Unspecified Anxiety Disorder  301.83 (F60.3) Borderline Personality Disorder.      Adult Day Treatment Program Multidisciplinary Team Members: Raza Leonard MD and/or Dr. Lorraine Patino, and/or Dr. Jaycob Boland MD;     Joel Pineda will participate in the Adult Day Treatment Program  1 days per week, 2 hours per day. Anticipated duration/discharge: 12 weeks    Due to COVID-19, services will be delivered via telemedicine until further notice.     Program Start Date: 3/10/17  Anticipated Discharge Date: 21 (pending authorization/clinical changes)    NOTE: Complete CGI     Review Date: Does Joel Pineda continue to meet criteria to participate in the Adult Day Treatment Program, 3 days per week; 3 hours per day?   21 yes   3/22/21 yes - Jaycob Boland MD   21 yes - Jaycob Boland MD   21 Discharged 21           Client Strengths:  goal-focused, good listener, has a previous history of therapy, insightful, intelligent and motivated    Client Participation in Plan:  Contributed to goals and plan     Areas of Vulnerability:  Suicidal Ideation     Long-Term Goals:  Reduction in level of symptoms  Maintenance of personal safety     Abuse Prevention Plan:  Safe, therapeutic environment   Safety coping plan as needed     Discharge Criteria:  Satisfactory progress toward treatment goals   Improvement re: identified problems and symptoms   Ability to continue recovery at next level of service      Areas of Treatment Focus        Area of Treatment Focus:   Personal Safety  Start Date:    3/17/21    Goal:  Target  Date: 4/28/21 Status: Completed  Client will notify staff when needing assistance to develop or implement a coping plan to manage suicidal or self injurious urges.      Progress:   4/28/21:   Tito reported intermittent passive suicidal ideation.  He consistently practices coping skills when having the passive SI.      5/19/21:  No suicidal ideation at discharge.    Treatment Strategies:   Teach adaptive coping skills and communication skills        Area of Treatment Focus:   Symptom Stabilization and Management  Start Date:    3/17/21    Goal:  Target Date: 4/28/21 Status: Completed  Tito will use group therapy to practice coping skills to manage stressors and prevent crisis.  He will continue to seek support in group as he waits to see his new individual therapist.        Progress:   4/28/21:  Tito is using group therapy to reinforce coping skills to manage stressors and to prevent crisis.  He has the first appointment with Jenny Anaya at [facility] on 5/7/21.      5/19/21:  Tito completed intake appointments with Jenny, but she did not have additional appointments available in the near future.  Tito was transferred to Carolynn Rowley at [facility] for individual therapy.    Treatment Strategies:   Teach adaptive coping skills and communication skills        Area of Treatment Focus:   Community Resources / Support and Discharge Planning  Start Date:    3/17/21    Goal:  Target Date: 4/28/21 Status: Completed  Will increase effective use of support / increase ability to ask for help. Identify support needs, create a list of things you could use help with.  Check in on status of waiting for new therapist.  Continue seeing psychiatrist.        Progress:   4/28/21:  Tito has been using coping skills as he waited for starting with new therapist.  He has the first appointment with Jenny Anaya on 5/7/21 at [facility].  He has continued to see his psychiatrist.  He is asking for help and identifying support needs.      5/19/21:   "See above for therapist start and transition.      Treatment Strategies:   Teach adaptive coping skills and communication skills  Use reality based supportive approach     Magali Dodson, [facility]      NOTE: Required signatures are completed manually and scanned into the electronic medical record. See \"Media\" tab in epic.    The Individualized Treatment Plan Signature Page has been routed to the provider for co-sign.    I have reviewed the patient's Individualized Treatment Plan and agree with the current goals, interventions and level of care.     Jaycob Boland MD  3/22/2021      "

## 2021-03-18 NOTE — ADDENDUM NOTE
Encounter addended by: Magali Dodson [facility] on: 3/18/2021 3:05 PM   Actions taken: Clinical Note Signed

## 2021-03-18 NOTE — PROGRESS NOTES
Acknowledgement of Current Treatment Plan       I have reviewed my treatment plan with my therapist / counselor on 3/17/21.   I agree with the plan as it is written in the electronic health record. ([facility])    Name:      Signature:  Joel Pineda Unable to sign due to COVID 19 pandemic     Dr Raza Leonard MD  Psychiatrist    Magali Dodson, [facility]  Psychotherapist FIORELLA Ybarra, [facility]

## 2021-03-18 NOTE — DISCHARGE SUMMARY
"       Adult Mental Health Intensive Outpatient Discharge Summary/Instructions      Patient: Joel Pineda MRN: [id number]   : 1975 Age: 45 year old Sex: male     Admission Date: 3/10/21  Discharge Date: 21  Diagnosis: 296.33 (F33.2) Major Depressive Disorder, Recurrent Episode, Severe _.  4. Other Diagnoses that is relevant to services:   300.00 (F41.9) Unspecified Anxiety Disorder  301.83 (F60.3) Borderline Personality Disorder.    Focus of Treatment / Progress    Personal Safety: No current suicidal ideation.     * Follow your safety plan     * Call crisis lines as needed:    [facility] [phone] [facility] [phone]  [facility] [phone] Crisis Connection [phone]  [facility] [phone] [facility] [phone]  [facility] [phone] National Suicide Prevention [phone]  [facility] [phone] Suicide Prevention [phone]  [facility] [phone]    Managing symptoms of:  Tito worked on skills to manage depressive and anxiety symptoms.    Community support/health:  Tito worked on managing health concerns and promoting wellness.    Managing Symptoms and Preventing Relapse    * Go to all of your appointments    * Take all medications as directed.      * Carry a current list if medication with you    * Do not use illicit (street) drugs.  Avoid alcohol    * Report these symptoms to your care team. These are early signs of relapse:   Thoughts of suicide   Losing more sleep   Increased confusion   Mood getting worse   Feeling more aggressive   Other:  Increase isolation    *Use these skills daily:  Talk to someone you trust at least one time weekly, set boundaries and say \"no\", be assertive, act opposite of negative feelings, accept challenges with a positive attitude, exercise at least three times per week for 30 minutes,  get enough sleep, eat healthy foods, get into a good routine    Copy of summary sent to: Available in EPIC    Follow up " with psychiatrist / main caregiver: [facility]    Next visit: monthly to six weeks    Follow up with your therapist: [facility]ynn Rowley at [facility]   Next visit: weekly visits or as scheduled    Go to group therapy and / or support groups at: Consider [facility] support groups.   Listings at [email or web address]    See your medical doctor about:  Tito works on managing health issues.      Other:  Tito plans to resume nordic walking,  He has two new cats.  He may use pool at health club.      Your treatment team appreciates having the opportunity to work with you and wishes you the best.    Client Signature:Unble to sign due to COVID 19 pandemic  Date / Time:5/19/21 1600  Staff Signature:FIORELLA Ybarra, [facility]  Date / Time:5/19/21 1600

## 2021-03-21 ENCOUNTER — MYC REFILL (OUTPATIENT)
Dept: FAMILY MEDICINE | Facility: CLINIC | Age: 46
End: 2021-03-21

## 2021-03-21 DIAGNOSIS — E03.9 ACQUIRED HYPOTHYROIDISM: ICD-10-CM

## 2021-03-22 ENCOUNTER — HOSPITAL ENCOUNTER (OUTPATIENT)
Dept: BEHAVIORAL HEALTH | Facility: CLINIC | Age: 46
End: 2021-03-22
Attending: PSYCHIATRY & NEUROLOGY
Payer: MEDICARE

## 2021-03-22 DIAGNOSIS — Z91.018 FOOD ALLERGY: ICD-10-CM

## 2021-03-22 PROCEDURE — 99214 OFFICE O/P EST MOD 30 MIN: CPT | Mod: 95 | Performed by: PSYCHIATRY & NEUROLOGY
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
## 2021-03-22 NOTE — PROGRESS NOTES
Patient Active Problem List   Diagnosis     Major depressive disorder, recurrent episode (H)     Intermittent asthma     Hyperlipidemia LDL goal <100     Chronic nonallergic rhinitis     Diverticulosis     GERD (gastroesophageal reflux disease)     Anxiety     LLOYD (obstructive sleep apnea)- mild (AHI 11)     Intracranial arachnoid cyst     Facet arthritis of cervical region     Acquired hypothyroidism     Bipolar 2 disorder (H)     Chronic midline low back pain without sciatica     Irritable bowel syndrome with diarrhea     B12 deficiency     Essential hypertension with goal blood pressure less than 140/90     Chronic pain syndrome     Ankylosing spondylitis of sacral region (H)     Morbid obesity due to hypertriglyceridemia (H)     Fatty infiltration of liver     DDD (degenerative disc disease), lumbar     Type 2 diabetes mellitus with complication, without long-term current use of insulin (H)     Peripheral polyneuropathy     History of pulmonary embolism     Ingrown toenail     Hypertriglyceridemia     Gastroparesis     Orthostatic dizziness     POTS (postural orthostatic tachycardia syndrome)     PHN (postherpetic neuralgia)     Dysautonomia (H)     Major depressive disorder, recurrent episode, severe (H)     .  Current Outpatient Medications:      acetaminophen 500 MG CAPS, Take 1,000 mg by mouth 2 times daily , Disp: 60 capsule, Rfl:      albuterol (PROAIR HFA/PROVENTIL HFA/VENTOLIN HFA) 108 (90 Base) MCG/ACT inhaler, Inhale 2 puffs into the lungs every 4 hours as needed for shortness of breath / dyspnea or wheezing, Disp: 8.5 g, Rfl: 11     albuterol (PROVENTIL) (2.5 MG/3ML) 0.083% neb solution, Take 1 vial (2.5 mg) by nebulization every 6 hours as needed for shortness of breath / dyspnea or wheezing, Disp: 200 mL, Rfl: 3     aspirin (ASA) 81 MG tablet, Take 81 mg by mouth daily , Disp: , Rfl:      baclofen (LIORESAL) 10 MG tablet, Take 0.5-1 tablets (5-10 mg) by mouth 2 times daily, Disp: 60 tablet, Rfl:  1     cetirizine (ZYRTEC) 10 MG tablet, Take 1 tablet (10 mg) by mouth At Bedtime, Disp: 30 tablet, Rfl: 11     cholecalciferol (VITAMIN D3) 26999 units (1250 mcg) capsule, Take one capsule every two weeks., Disp: 24 capsule, Rfl: 1     clonazePAM (KLONOPIN) 0.5 MG tablet, Take 0.5 mg by mouth At Bedtime , Disp: , Rfl:      cyanocobalamin (CYANOCOBALAMIN) 1000 MCG/ML injection, Inject 1 mL (1,000 mcg) into the muscle every 30 days, Disp: 10 mL, Rfl: 0     DULoxetine (CYMBALTA) 60 MG capsule, Take 60 mg by mouth 2 times daily , Disp: , Rfl:      EPINEPHrine (EPIPEN/ADRENACLICK/OR ANY BX GENERIC EQUIV) 0.3 MG/0.3ML injection 2-pack, Inject 0.3 mLs (0.3 mg) into the muscle once as needed for anaphylaxis, Disp: 0.6 mL, Rfl: 3     etanercept (ENBREL SURECLICK) 50 MG/ML autoinjector, Inject 50 mg Subcutaneous once a week Hold for signs of infection, and seek medical attention., Disp: 4 mL, Rfl: 6     famotidine (PEPCID) 20 MG tablet, Prior to administration of Humira every 2 weeks (Patient taking differently: Take 20 mg by mouth every 7 days Prior to Enbrel Injections to prevent skin reaction), Disp: , Rfl:      fluconazole (DIFLUCAN) 200 MG tablet, Take 1 tablet (200 mg) by mouth daily, Disp: 3 tablet, Rfl: 0     fluticasone (FLONASE) 50 MCG/ACT nasal spray, Spray 1 spray into both nostrils daily, Disp: , Rfl:      fluticasone-salmeterol (ADVAIR) 500-50 MCG/DOSE inhaler, Inhale 1 puff into the lungs every 12 hours, Disp: 3 each, Rfl: 3     glipiZIDE (GLUCOTROL XL) 5 MG 24 hr tablet, Take 1 tablet (5 mg) by mouth daily, Disp: 90 tablet, Rfl: 1     lamoTRIgine (LAMICTAL) 100 MG tablet, Take 200 mg by mouth daily, Disp: , Rfl:      levothyroxine (SYNTHROID/LEVOTHROID) 75 MCG tablet, TAKE 1 TABLET EVERY MORNING, Disp: 90 tablet, Rfl: 0     lidocaine (XYLOCAINE) 5 % external ointment, Apply topically 4 times daily as needed (pain), Disp: 50 g, Rfl: 2     melatonin 3 MG tablet, Take 9 mg by mouth nightly as needed , Disp: ,  Rfl:      metoprolol succinate ER (TOPROL-XL) 200 MG 24 hr tablet, Take 1 tablet (200 mg) by mouth 2 times daily, Disp: 180 tablet, Rfl: 1     montelukast (SINGULAIR) 10 MG tablet, Take 1 tablet (10 mg) by mouth every evening, Disp: 90 tablet, Rfl: 1     nabumetone (RELAFEN) 500 MG tablet, Take 1-2 tablets (500-1,000 mg) by mouth 2 times daily (with meals) as needed for back/joint pain., Disp: 60 tablet, Rfl: 5     naloxone (NARCAN) 4 MG/0.1ML nasal spray, Spray 1 spray (4 mg) into one nostril alternating nostrils as needed for opioid reversal every 2-3 minutes until assistance arrives, Disp: 0.2 mL, Rfl: 0     omega-3 acid ethyl esters (LOVAZA) 1 g capsule, TAKE 2 CAPSULES BY MOUTH TWICE DAILY., Disp: 360 capsule, Rfl: 1     omeprazole 20 MG tablet, Take 20 mg by mouth daily , Disp: , Rfl:      ondansetron (ZOFRAN-ODT) 8 MG ODT tab, Take 1 tablet (8 mg) by mouth every 8 hours as needed for nausea, Disp: 30 tablet, Rfl: 3     order for DME, Equipment being ordered: Assure Compression stockings (ANNETTA) Large, closed toe, thigh-high 30-40 compression, Disp: 2 Units, Rfl: 3     order for DME, Equipment being ordered: lumbosacral belt/brace, Disp: 1 Units, Rfl: 0     order for DME, Respironics REMSTAR 60 Series Auto CPAP 9-13 cm H2O, Wisp nasal mask w/a large cushion and a chinstrap, Disp: , Rfl:      oxyCODONE (ROXICODONE) 5 MG tablet, Take 1-2 tablets (5-10 mg) by mouth every 6 hours as needed for pain (maximum 4 tablet(s) per day), Disp: 35 tablet, Rfl: 0     polyethylene glycol (MIRALAX) 17 g packet, Take 1 packet by mouth daily, Disp: , Rfl:      pregabalin (LYRICA) 150 MG capsule, Take 1 capsule (150 mg) by mouth 2 times daily, Disp: 60 capsule, Rfl: 11     QUEtiapine (SEROQUEL) 25 MG tablet, Take 100 mg by mouth At Bedtime , Disp: , Rfl:      ramipril (ALTACE) 10 MG capsule, Take 1 capsule (10 mg) by mouth daily, Disp: 90 capsule, Rfl: 1     rizatriptan (MAXALT-MLT) 5 MG ODT, DISSOLVE 1 TABLET BY MOUTH AT ONSET OF  HEADACHE, Disp: 30 tablet, Rfl: 0     rosuvastatin (CRESTOR) 40 MG tablet, Take 1 tablet (40 mg) by mouth daily, Disp: 90 tablet, Rfl: 2     syringe, disposable, (BD TUBERCULIN SYRINGE) 1 ML MISC, Equipment being ordered: 1 ml tuberculin syringes to be used for Vitamin B12 injections., Disp: 12 each, Rfl: 11     vitamin B complex with vitamin C (STRESS TAB) tablet, Take 1 tablet by mouth daily, Disp: , Rfl:      ZINC SULFATE-VITAMIN C MT, Take 1 tablet by mouth daily, Disp: , Rfl:   Psychiatry staffing: case discussed  Diagnosis:    As above, recent transition to the aftercare clinic

## 2021-03-23 ENCOUNTER — VIRTUAL VISIT (OUTPATIENT)
Dept: FAMILY MEDICINE | Facility: CLINIC | Age: 46
End: 2021-03-23
Payer: MEDICARE

## 2021-03-23 DIAGNOSIS — R10.84 ABDOMINAL PAIN, GENERALIZED: Primary | ICD-10-CM

## 2021-03-23 DIAGNOSIS — Z91.018 FOOD ALLERGY: ICD-10-CM

## 2021-03-23 DIAGNOSIS — K59.01 SLOW TRANSIT CONSTIPATION: ICD-10-CM

## 2021-03-23 PROCEDURE — 99214 OFFICE O/P EST MOD 30 MIN: CPT | Mod: 95 | Performed by: FAMILY MEDICINE

## 2021-03-23 RX ORDER — LEVOTHYROXINE SODIUM 75 UG/1
TABLET ORAL
Qty: 90 TABLET | Refills: 3 | Status: SHIPPED | OUTPATIENT
Start: 2021-03-23 | End: 2022-03-23

## 2021-03-23 RX ORDER — EPINEPHRINE 0.3 MG/.3ML
0.3 INJECTION SUBCUTANEOUS
Qty: 0.6 ML | Refills: 3 | Status: SHIPPED | OUTPATIENT
Start: 2021-03-23 | End: 2022-05-17

## 2021-03-23 NOTE — PROGRESS NOTES
Tito is a 45 year old who is being evaluated via a billable video visit.      How would you like to obtain your AVS? MyChart  If the video visit is dropped, the invitation should be resent by: Text to cell phone: 1  Will anyone else be joining your video visit? No      Video Start Time: 1459    Assessment & Plan     Abdominal pain, generalized  Reviewed recent hospital records with patient and through care everywhere.  Developed significant abdominal pain.  Found to be fairly constipated and it was felt that his slow transit constipation may be related to both some gastroparesis as well as narcotic induced constipation.  In any case he was placed back on Reglan.  We had stopped this previously because of the long-term risks.  But this does seem to be helping get things moving.  Has a follow-up with [facility] and also has established relationship with [facility].  So as far as the long-term risks patient is aware but we will set this aside for now in the short-term as this is evaluated.  Appetite is returning.  Still some pain.  But bowel movements are starting to be more regular.    Slow transit constipation  As above    Food allergy  Stable on current regimen.  Continue same plan and routine follow-up.   - EPINEPHrine (ANY BX GENERIC EQUIV) 0.3 MG/0.3ML injection 2-pack; Inject 0.3 mLs (0.3 mg) into the muscle once as needed for anaphylaxis       See Patient Instructions    Return in about 3 weeks (around 4/13/2021) for Contact me with progress, Ne[employer] message.    Ksenia Lyles MD  [facility]    Subjective     HPI     Video visit with patient today in follow-up of recent hospitalization.  Full records reviewed with him and through care everywhere.  Improving slowly.  Appetite starting to come back.  Has follow-up scheduled.    Review of Systems   Constitutional, HEENT, cardiovascular, pulmonary, gi and gu systems are negative, except as otherwise noted.      Objective            Vitals:  No vitals were obtained today due to virtual visit.    Physical Exam   GENERAL: Healthy, alert and no distress  EYES: Eyes grossly normal to inspection.  No discharge or erythema, or obvious scleral/conjunctival abnormalities.  RESP: No audible wheeze, cough, or visible cyanosis.  No visible retractions or increased work of breathing.    SKIN: Visible skin clear. No significant rash, abnormal pigmentation or lesions.  NEURO: Cranial nerves grossly intact.  Mentation and speech appropriate for age.  PSYCH: Mentation appears normal, affect normal/bright, judgement and insight intact, normal speech and appearance well-groomed.    Past labs reviewed with the patient.   Reviewed imaging.            Video-Visit Details    Type of service:  Video Visit    Video End Time:1510    Originating Location (pt. Location): Home    Distant Location (provider location):  [facility]     Platform used for Video Visit: MynewMD

## 2021-03-23 NOTE — TELEPHONE ENCOUNTER
LVM explaining beds are queen sized and pt could sleep in the middle. He is welcome to bring rails if he has them. Otherwise we could look into recheduling at [facility] where they have rails.   No

## 2021-03-24 ENCOUNTER — IMMUNIZATION (OUTPATIENT)
Dept: NURSING | Facility: CLINIC | Age: 46
End: 2021-03-24
Payer: MEDICARE

## 2021-03-24 ENCOUNTER — HOSPITAL ENCOUNTER (OUTPATIENT)
Dept: BEHAVIORAL HEALTH | Facility: CLINIC | Age: 46
End: 2021-03-24
Attending: PSYCHIATRY & NEUROLOGY
Payer: MEDICARE

## 2021-03-24 PROCEDURE — 90853 GROUP PSYCHOTHERAPY: CPT | Mod: GT | Performed by: SOCIAL WORKER

## 2021-03-24 PROCEDURE — 0001A PR COVID VAC PFIZER DIL RECON 30 MCG/0.3 ML IM: CPT

## 2021-03-24 PROCEDURE — 91300 PR COVID VAC PFIZER DIL RECON 30 MCG/0.3 ML IM: CPT

## 2021-03-24 RX ORDER — EPINEPHRINE 0.3 MG/.3ML
INJECTION SUBCUTANEOUS
OUTPATIENT
Start: 2021-03-24

## 2021-03-24 NOTE — GROUP NOTE
Process Group Note    PATIENT'S NAME: Joel Pineda  MRN:   [id number]  :   1975  ACCT. NUMBER: [id number]  DATE OF SERVICE: 3/24/21  START TIME:  1:00 PM  END TIME:  1:50 PM  FACILITATOR: Magali Dodson LICSW  TOPIC:  Process Group    Diagnoses:  296.33 (F33.2) Major Depressive Disorder, Recurrent Episode, Severe _.  4. Other Diagnoses that is relevant to services:   300.00 (F41.9) Unspecified Anxiety Disorder  301.83 (F60.3) Borderline Personality Disorder.      [facility] Day Treatment  TRACK: [facility]    NUMBER OF PARTICIPANTS: 7    Telemedicine Visit: The patient's condition can be safely assessed and treated via synchronous audio and visual telemedicine encounter.      Reason for Telemedicine Visit: Services only offered telehealth    Originating Site (Patient Location): Patient's home    Distant Site (Provider Location): Provider Remote Setting    Consent:  The patient/guardian has verbally consented to: the potential risks and benefits of telemedicine (video visit) versus in person care; bill my insurance or make self-payment for services provided; and responsibility for payment of non-covered services.     Mode of Communication:  Video Conference via GrownOut    As the provider I attest to compliance with applicable laws and regulations related to telemedicine.     Client participated in educatonal group on skills for managing anger.      Data:    Session content: At the start of this group, patients were invited to check in by identifying themselves, describing their current emotional status, and identifying issues to address in this group.   Area(s) of treatment focus addressed in this session included Symptom Management, Personal Safety and Community Resources/Discharge Planning.  Tito reported having a headache after getting the COVID 19 vaccination.  He reported depressed and anxious mood.  He reported going to the hospital for an autoimmune issue  which impacts the G.I. system.  He stated that he will schedule with the gastroenterologist.  Client denied suicidal ideation, intent and plan.     Therapeutic Interventions/Treatment Strategies:  Psychotherapist offered support, feedback and validation and reinforced use of skills. Treatment modalities used include Cognitive Behavioral Therapy. Interventions include Cognitive Restructuring:  Assisted patient in formulating new neutral/positive alternatives to challenge less helpful / ineffective thoughts and Coping Skills: Assisted patient in identifying 1-2 healthy distraction skills to reduce overall distress.    Assessment:    Patient response:   Patient responded to session by giving feedback, listening and focusing on goals    Possible barriers to participation / learning include: and no barriers identified    Health Issues:   None reported       Substance Use Review:   Substance Use: No active concerns identified.    Mental Status/Behavioral Observations  Appearance:   Appropriate   Eye Contact:   Good   Psychomotor Behavior: Normal   Attitude:   Cooperative   Orientation:   All  Speech   Rate / Production: Normal    Volume:  Normal   Mood:    Anxious  Depressed   Affect:    Appropriate   Thought Content:   Clear  Thought Form:  Coherent  Logical     Insight:    Good     Plan:     Safety Plan: No current safety concerns identified.  Recommended that patient call 911 or go to the local ED should there be a change in any of these risk factors.     Barriers to treatment: None identified    Patient Contracts (see media tab):  None    Substance Use: Not addressed in session     Continue or Discharge: Patient will continue in Adult Day Treatment (ADT)  as planned. Patient is likely to benefit from learning and using skills as they work toward the goals identified in their treatment plan.      Magali Dodson, Health system  March 24, 2021

## 2021-03-26 ENCOUNTER — VIRTUAL VISIT (OUTPATIENT)
Dept: RHEUMATOLOGY | Facility: CLINIC | Age: 46
End: 2021-03-26
Payer: MEDICARE

## 2021-03-26 ENCOUNTER — MYC REFILL (OUTPATIENT)
Dept: FAMILY MEDICINE | Facility: CLINIC | Age: 46
End: 2021-03-26

## 2021-03-26 DIAGNOSIS — M45.9 ANKYLOSING SPONDYLITIS, UNSPECIFIED SITE OF SPINE (H): Primary | ICD-10-CM

## 2021-03-26 DIAGNOSIS — M51.369 DDD (DEGENERATIVE DISC DISEASE), LUMBAR: ICD-10-CM

## 2021-03-26 DIAGNOSIS — M45.8 ANKYLOSING SPONDYLITIS OF SACRAL REGION (H): ICD-10-CM

## 2021-03-26 PROCEDURE — 99214 OFFICE O/P EST MOD 30 MIN: CPT | Mod: 95 | Performed by: INTERNAL MEDICINE

## 2021-03-26 RX ORDER — MEDROXYPROGESTERONE ACETATE 150 MG/ML
50 INJECTION, SUSPENSION INTRAMUSCULAR WEEKLY
Qty: 4 ML | Refills: 12 | Status: SHIPPED | OUTPATIENT
Start: 2021-03-26 | End: 2021-10-19

## 2021-03-26 RX ORDER — OXYCODONE HYDROCHLORIDE 5 MG/1
5-10 TABLET ORAL EVERY 6 HOURS PRN
Qty: 35 TABLET | Refills: 0 | Status: SHIPPED | OUTPATIENT
Start: 2021-03-26 | End: 2021-04-11

## 2021-03-26 NOTE — TELEPHONE ENCOUNTER
Routing refill request to provider for review/approval because:  Drug not on the [facility] refill protocol     Gayle ALJEANDRON, RN

## 2021-03-26 NOTE — PATIENT INSTRUCTIONS
RHEUMATOLOGY    Dr. Oneil Baxter    [facility]  [address] [place] [address]    Our new phone number for Rheumatology is [phone], this number will be able to help you schedule appointments for Dr. Baxter or if you have any message you would like sent to us.    Thank you for choosing [facility]!    Mackenzie Cavazos [facility] Rheumatology

## 2021-03-26 NOTE — PROGRESS NOTES
"Joel Pineda  is a 45 year old year old male who is being evaluated via a billable video visit.      How would you like to obtain your AVS? MyChart  If the video visit is dropped, the invitation should be resent by: Text to cell phone: [phone]  Will anyone else be joining your video visit? No    Rheumatology Video Visit      Joel Pineda MRN# [id number]   YOB: 1975 Age: 45 year old      Date of visit: 3/26/21   PCP: Dr. Ksenia Lyles    Chief Complaint   Patient presents with:  Ankylosing spondylitis    Assessment and Plan     1.  Ankylosing spondylitis: Many years of inflammatory back pain but no clear sacroiliitis seen on x-rays or MRIs; then had a lumbar spine x-ray on 2/23/2018 at [facility] showing \"Moderate L5-S1 and mild L4-5 degenerative disc disease and degenerative facet arthropathy. No evidence for fracture, subluxation, or spondylolisthesis. Chronic bridging enthesophyte across the lower right SI joint with irregularity of the joint space suspicious for sequelae of chronic inflammatory sacroiliitis. Correlate clinically.\"  This is complicated by a history of back surgery and degenerative changes.  HLA-B27 positive per record review but the actual lab report has not been seen.  He has a personal history of diverticulitis requiring sigmoidectomy.  Reportedly his mother has ulcerative colitis. Based on his symptoms, the x-ray results, and HLA-B27 positive, it is most likely ankylosing spondylitis and Remicade was started with improvement of lower back pain and resolution of lower back stiffness. However, Remicade was also associated with increased infections so it was changed to Simponi; Simponi was associated with hives, nausea, dizziness, and drowsiness, Humira (effective but associated with a sensation of burning on his tongue, and longstanding nausea that didn't seem related to me but did to Mr. Pineda). Has responded to TNF inhibitors so Enbrel was used and was doing well until recently " when he held it 2 weeks for the COVID19 vaccine (doesn't need to hold for this vaccine based on current thought so he is going to restart).  Chronic illness  - Continue Enbrel 50mg SQ every 7 days  - Lab: QuantiFERON-TB gold plus     2.  Degenerative low back pain: Following in the pain management clinic.  Chronic illness    3. Obesity; reported hx of fatty liver disease: encouraged weight loss and discussed the health benefit    4. Neck pain: Has improved with PT exercises    # At this time the COVID-19 vaccine (currently available from Pfizer, Moderna, and adsquare) is recommended based on our knowledge of this vaccine and vaccines in the past.  Based on our current knowledge there is no preference for one COVID-19 vaccine over another. Note that there is no data currently available to specifically establish safety and efficacy for these vaccines in immunocompromised people.  Status-post 1 dose of the Pfizer COVID-19 vaccine, with plans to get the second    # This is a virtual visit to reduce the risk of COVID-19 exposure during this current pandemic.      Total minutes spent in evaluation with patient, documentation, , and review of pertinent studies and chart notes: 13     Mr. Pineda verbalized agreement with and understanding of the rational for the diagnosis and treatment plan.  All questions were answered to best of my ability and the patient's satisfaction. Mr. Pineda was advised to contact the clinic with any questions that may arise after the clinic visit.      Thank you for involving me in the care of the patient    Return to clinic: 5-6 months      HPI   Joel Pineda is a 45 year old male with a past medical history significant for hypertension, hyperlipidemia, asthma, history of pulmonary embolism, history of diverticulitis requiring sigmoidectomy, GERD, obstructive sleep apnea, bipolar disorder, hypothyroidism, B12 deficiency, CKD? (reportedly issue with contrast) and chronic pain  "syndrome who presents for follow-up of ankylosing spondylitis.    Today, 3/26/2021: had constipation thought to be 2/2 gastroparesis and now back on reglan. Going to [facility] for reglan Rx now.  Numbness/tingling of his face from time to time, on the left maxillary region, and \"screwy vision in the left eye\" with plans to see ophthalmology within the next couple weeks.  Using night splints for plantar fasciitis. Back has been sore; doing the chronic pain PT. No blood in stool.  Held a dose of Enbrel for the COVID19 vaccine and felt a bit achier.  Planning to restart Enbrel    Mother: ulcerative colitis    Tobacco: None  EtOH: None  Drugs: None    ROS   12 point review of system was completed and negative except as noted in the HPI     Active Problem List     Patient Active Problem List   Diagnosis     Major depressive disorder, recurrent episode (H)     Intermittent asthma     Hyperlipidemia LDL goal <100     Chronic nonallergic rhinitis     Diverticulosis     GERD (gastroesophageal reflux disease)     Anxiety     LLOYD (obstructive sleep apnea)- mild (AHI 11)     Intracranial arachnoid cyst     Facet arthritis of cervical region     Acquired hypothyroidism     Bipolar 2 disorder (H)     Chronic midline low back pain without sciatica     Irritable bowel syndrome with diarrhea     B12 deficiency     Essential hypertension with goal blood pressure less than 140/90     Chronic pain syndrome     Ankylosing spondylitis of sacral region (H)     Morbid obesity due to hypertriglyceridemia (H)     Fatty infiltration of liver     DDD (degenerative disc disease), lumbar     Type 2 diabetes mellitus with complication, without long-term current use of insulin (H)     Peripheral polyneuropathy     History of pulmonary embolism     Ingrown toenail     Hypertriglyceridemia     Gastroparesis     Orthostatic dizziness     POTS (postural orthostatic tachycardia syndrome)     PHN (postherpetic neuralgia)     Dysautonomia (H)     Major " depressive disorder, recurrent episode, severe (H)     Past Medical History     Past Medical History:   Diagnosis Date     Acne      Acquired hypothyroidism      Allergic state      Ankylosing spondylitis lumbar region (H)      Ankylosing spondylitis of sacral region (H)      Anxiety      Bipolar 2 disorder (H)      Chest pain     Chest pain, regulated w/BP meds. Clear arteries.     Chronic pain      DDD (degenerative disc disease), lumbar      Depressive disorder      Diabetes (H)      Diverticulosis      Facet arthritis of cervical region      Gastroesophageal reflux disease      Hypertension      IBS (irritable bowel syndrome)      Intracranial arachnoid cyst      Polyneuropathy      Pulmonary embolism (H)      Skin exam, screening for cancer 12/3/2013     Sleep apnea      Uncomplicated asthma      Past Surgical History     Past Surgical History:   Procedure Laterality Date     BACK SURGERY  10/07    lumbar discectomy L5-S1     COLONOSCOPY      Note: colonoscopy scheduled with [facility] on Friday, 9/4/15     COSMETIC SURGERY  2012    Nose Exterior - functional     GI SURGERY  August 2013    Sigmoidectomy     HERNIA REPAIR, UMBILICAL  8/23/11    Dr. Evan whiting     INCISION AND DRAINAGE, ABSCESS, COMPLEX  8/23/11    umbilical, Dr. Evan Beavers     LAPAROSCOPIC ASSISTED COLECTOMY LEFT (DESCENDING)  8/15/2013    Procedure: LAPAROSCOPIC ASSISTED COLECTOMY LEFT (DESCENDING);  Laparoscopic Hand Assisted Sigmoid Resection, Mobilization of Splenic Fissure, coloproctoscopy, *Latex Free Room* Anesthesia General with Pain block  ;  Surgeon: Aurora Justice MD;  Location: [facility] OR     NERVE SURGERY  8/18/11    RF ablation @ L3-S1 @ [facility]     RECONSTRUCT NOSE AND SEPTUM (FUNCTIONAL)  10/14/2011    Procedure:RECONSTRUCT NOSE AND SEPTUM (FUNCTIONAL); Functional Septorhinoplasty, Turbinate Reduction, ; Surgeon:CEDRIC CUEVAS; Location:[facility] OR     SINUS SURGERY  10/1/01    ethmoidectomy chronic sinusitis     Allergy      Allergies   Allergen Reactions     Amoxicillin-Pot Clavulanate Difficulty breathing     Banana Shortness Of Breath     Pt reports organic Banana is okay.      Nitroglycerin Palpitations     Penicillins Anaphylaxis     Provigil [Modafinil] Shortness Of Breath     headache     Gadolinium Hives and Itching     Patient was premedicated for the contrast allergy. He did still have a reaction a few hours after injection. Hives and itching. Dr. Gomez told tech to inform pt he should only have contrast again in the future when premedicated and at a hospital. Not at an outpatient facility.      Ketoconazole      Topical cream caused swelling and itching     Dye [Contrast Dye] Other (See Comments) and Hives     Moderate flushing, CT contrast     Golimumab      Hives, bradycardia, face swelling     Neurontin [Gabapentin] Hives     Moderate hives     Nortriptyline Hives     Varicella Virus Vaccine Live      Rash     Flagyl [Metronidazole Hcl] Palpitations and Hives     Latex Rash     Metronidazole Palpitations, Other (See Comments) and Rash     dizziness (versus ciprofloxacin taken at same time)     No Clinical Screening - See Comments Rash     Nitrile gloves     Current Medication List     Current Outpatient Medications   Medication Sig     acetaminophen 500 MG CAPS Take 1,000 mg by mouth 2 times daily      albuterol (PROAIR HFA/PROVENTIL HFA/VENTOLIN HFA) 108 (90 Base) MCG/ACT inhaler Inhale 2 puffs into the lungs every 4 hours as needed for shortness of breath / dyspnea or wheezing     albuterol (PROVENTIL) (2.5 MG/3ML) 0.083% neb solution Take 1 vial (2.5 mg) by nebulization every 6 hours as needed for shortness of breath / dyspnea or wheezing     aspirin (ASA) 81 MG tablet Take 81 mg by mouth daily      baclofen (LIORESAL) 10 MG tablet Take 0.5-1 tablets (5-10 mg) by mouth 2 times daily     cetirizine (ZYRTEC) 10 MG tablet Take 1 tablet (10 mg) by mouth At Bedtime     cholecalciferol (VITAMIN D3) 96758 units  (1250 mcg) capsule Take one capsule every two weeks.     clonazePAM (KLONOPIN) 0.5 MG tablet Take 0.5 mg by mouth At Bedtime      cyanocobalamin (CYANOCOBALAMIN) 1000 MCG/ML injection Inject 1 mL (1,000 mcg) into the muscle every 30 days     DULoxetine (CYMBALTA) 60 MG capsule Take 60 mg by mouth 2 times daily      etanercept (ENBREL SURECLICK) 50 MG/ML autoinjector Inject 50 mg Subcutaneous once a week Hold for signs of infection, and seek medical attention.     famotidine (PEPCID) 20 MG tablet Prior to administration of Humira every 2 weeks (Patient taking differently: Take 20 mg by mouth every 7 days Prior to Enbrel Injections to prevent skin reaction)     fluticasone (FLONASE) 50 MCG/ACT nasal spray Spray 1 spray into both nostrils daily     fluticasone-salmeterol (ADVAIR) 500-50 MCG/DOSE inhaler Inhale 1 puff into the lungs every 12 hours     glipiZIDE (GLUCOTROL XL) 5 MG 24 hr tablet Take 1 tablet (5 mg) by mouth daily     lamoTRIgine (LAMICTAL) 100 MG tablet Take 200 mg by mouth daily     levothyroxine (SYNTHROID/LEVOTHROID) 75 MCG tablet TAKE 1 TABLET EVERY MORNING     lidocaine (XYLOCAINE) 5 % external ointment Apply topically 4 times daily as needed (pain)     melatonin 3 MG tablet Take 9 mg by mouth nightly as needed      metoprolol succinate ER (TOPROL-XL) 200 MG 24 hr tablet Take 1 tablet (200 mg) by mouth 2 times daily     montelukast (SINGULAIR) 10 MG tablet Take 1 tablet (10 mg) by mouth every evening     nabumetone (RELAFEN) 500 MG tablet Take 1-2 tablets (500-1,000 mg) by mouth 2 times daily (with meals) as needed for back/joint pain.     naloxone (NARCAN) 4 MG/0.1ML nasal spray Spray 1 spray (4 mg) into one nostril alternating nostrils as needed for opioid reversal every 2-3 minutes until assistance arrives     omega-3 acid ethyl esters (LOVAZA) 1 g capsule TAKE 2 CAPSULES BY MOUTH TWICE DAILY.     omeprazole 20 MG tablet Take 20 mg by mouth daily      ondansetron (ZOFRAN-ODT) 8 MG ODT tab Take 1  tablet (8 mg) by mouth every 8 hours as needed for nausea     oxyCODONE (ROXICODONE) 5 MG tablet Take 1-2 tablets (5-10 mg) by mouth every 6 hours as needed for pain (maximum 4 tablet(s) per day)     polyethylene glycol (MIRALAX) 17 g packet Take 1 packet by mouth daily     pregabalin (LYRICA) 150 MG capsule Take 1 capsule (150 mg) by mouth 2 times daily     QUEtiapine (SEROQUEL) 25 MG tablet Take 100 mg by mouth At Bedtime      ramipril (ALTACE) 10 MG capsule Take 1 capsule (10 mg) by mouth daily     rizatriptan (MAXALT-MLT) 5 MG ODT DISSOLVE 1 TABLET BY MOUTH AT ONSET OF HEADACHE     rosuvastatin (CRESTOR) 40 MG tablet Take 1 tablet (40 mg) by mouth daily     syringe, disposable, (BD TUBERCULIN SYRINGE) 1 ML MISC Equipment being ordered: 1 ml tuberculin syringes to be used for Vitamin B12 injections.     vitamin B complex with vitamin C (STRESS TAB) tablet Take 1 tablet by mouth daily     ZINC SULFATE-VITAMIN C MT Take 1 tablet by mouth daily     EPINEPHrine (ANY BX GENERIC EQUIV) 0.3 MG/0.3ML injection 2-pack Inject 0.3 mLs (0.3 mg) into the muscle once as needed for anaphylaxis     order for DME Equipment being ordered: Assure Compression stockings (ANNETTA) Large, closed toe, thigh-high 30-40 compression     order for DME Equipment being ordered: lumbosacral belt/brace     order for DME Respironics REMSTAR 60 Series Auto CPAP 9-13 cm H2O, Wisp nasal mask w/a large cushion and a chinstrap     No current facility-administered medications for this visit.          Social History   See HPI    Family History     Family History   Problem Relation Age of Onset     Musculoskeletal Disorder Mother         back     Anxiety Disorder Mother      Colon Polyps Mother      Ulcerative Colitis Mother         and ischemic small intestine, surgery     Hypertension Mother      Breast Cancer Mother      Osteoporosis Mother      Diabetes Mother         Type 2, Diagnosed in 2014     Depression Mother         Takes Cymbalta to help with  "chronic pain + depx     Thyroid Disease Mother         Hypothyroidism     Obesity Mother         Under much better control latter half of 2015     Musculoskeletal Disorder Father         back     Substance Abuse Father      Hypertension Father      Hyperlipidemia Father      Depression Father         Off meds for many years. Seems \"ok\"     Heart Disease Maternal Grandmother      Heart Disease Maternal Grandfather      Psychotic Disorder Paternal Grandfather      Suicide Paternal Grandfather      Depression Paternal Grandfather         Pediatrician. Committed suicide by pistol in 1990.     Musculoskeletal Disorder Brother         back     Depression Brother         Expressed as anger and moodiness     Substance Abuse Brother      Substance Abuse Sister      Depression Sister         Mental Health Therapist, yet no anti-depressants?     Anxiety Disorder Sister         Mental Health Therapist, yet no anti-anxiety meds?     Other Cancer Other         Bladder Cancer - Fatal     Substance Abuse Brother      Colon Cancer No family hx of      Crohn's Disease No family hx of      Anesthesia Reaction No family hx of      Cancer No family hx of         No family history of skin cancer     Physical Exam     Temp Readings from Last 3 Encounters:   03/15/21 97.8  F (36.6  C) (Oral)   02/10/21 97.1  F (36.2  C) (Tympanic)   12/21/20 98.5  F (36.9  C) (Oral)     BP Readings from Last 5 Encounters:   03/15/21 132/82   02/26/21 (!) 152/91   02/11/21 (!) 140/80   02/10/21 136/85   01/25/21 137/89     Pulse Readings from Last 1 Encounters:   03/15/21 78     Resp Readings from Last 1 Encounters:   03/15/21 16     Estimated body mass index is 39.67 kg/m  as calculated from the following:    Height as of 3/15/21: 1.981 m (6' 6\").    Weight as of 3/15/21: 155.7 kg (343 lb 4.8 oz).    No vitals taken today because this is a virtual visit    GEN: NAD. Healthy appearing adult.   HEENT: MMM.  Anicteric, noninjected sclera. No obvious external " lesions of the ear and nose. Hearing intact.  PULM: No increased work of breathing  MSK:  Hands and wrists without swelling.   SKIN: No rash or jaundice seen  PSYCH: Alert. Appropriate.        Labs / Imaging (select studies)     RF/CCP  Recent Labs   Lab Test 08/07/15  0846 09/03/14  1232   RHF <20 <20     CBC  Recent Labs   Lab Test 10/09/20  1550 12/27/19  1209 09/26/19  1010   WBC 9.6 8.0 7.7   RBC 4.92 5.20 5.15   HGB 15.2 14.9 15.2   HCT 45.9 45.0 46.2   MCV 93 87 90   RDW 13.2 14.4 13.8    263 262   MCH 30.9 28.7 29.5   MCHC 33.1 33.1 32.9   NEUTROPHIL 51.2 45.0 39.7   LYMPH 35.2 38.1 44.9   MONOCYTE 10.2 13.7 12.2   EOSINOPHIL 2.1 2.4 2.3   BASOPHIL 1.1 0.8 0.9   ANEU 4.9 3.6 3.1   ALYM 3.4 3.0 3.5   KATIE 1.0 1.1 0.9   AEOS 0.2 0.2 0.2   ABAS 0.1 0.1 0.1     CMP  Recent Labs   Lab Test 10/16/20  0824 10/09/20  1550 07/03/20  1251 01/24/20  1637 09/26/19  1010    139 139 136  --    POTASSIUM 4.2 4.1 3.9 4.4  --    CHLORIDE 104 108 104 106  --    CO2 26 22 30 24  --    ANIONGAP 5 8 5 6  --    * 128* 105* 99  --    BUN 10 11 17 14  --    CR 1.28* 0.77 0.84 0.81 0.83   GFRESTIMATED 66 >90 >90 >90 >90   GFRESTBLACK 77 >90 >90 >90 >90   ALYCE 9.4 9.3 9.4 9.2  --    BILITOTAL  --  0.4  --  0.4 0.4   ALBUMIN  --  4.3  --  4.4 4.3   PROTTOTAL  --  7.9  --  7.7 7.7   ALKPHOS  --  113  --  104 107   AST  --  53*  --  60* 48*   ALT  --  53  --  61 53     Calcium/VitaminD  Recent Labs   Lab Test 02/10/21  1809 10/16/20  0824 10/09/20  1550 07/03/20  1251 09/13/19  0943 09/13/19  0943 01/03/19  0844 01/03/19  0844   ALYCE  --  9.4 9.3 9.4   < >  --    < > 9.6   VITDT 38  --   --   --   --  40  --  38    < > = values in this interval not displayed.     ESR/CRP  Recent Labs   Lab Test 10/09/20  1550 07/03/20  1251 09/26/19  1010 04/01/19  1540   SED  --  4 4 4   CRP <2.9  --  <2.9 <2.9     Hepatitis B  Recent Labs   Lab Test 02/28/18  1157 01/06/15  1028   HBCAB Nonreactive  --    HEPBANG Nonreactive  Nonreactive     Hepatitis C  Recent Labs   Lab Test 02/28/18  1157 01/06/15  1028   HCVAB Nonreactive Nonreactive   Assay performance characteristics have not been established for newborns,   infants, and children       Lyme ab screening  Recent Labs   Lab Test 02/22/19  1457   LYMEGM 0.02     Tuberculosis Screening  Recent Labs   Lab Test 02/28/18  1157 01/06/15  1028   TBRSLT Negative Negative   TBAGN 0.00 0.00     Immunization History     Immunization History   Administered Date(s) Administered     COVID-19,PF,Pfizer 03/24/2021     FLU 6-35 months 01/03/2019     Flu, Unspecified 08/01/2012, 09/12/2019, 08/20/2020     HepB-Adult 02/10/2021, 03/10/2021     Influenza (H1N1) 02/04/2010     Influenza (IIV3) PF 11/11/1999, 10/28/2003, 10/15/2008, 08/21/2012, 08/02/2013, 09/09/2013     Influenza Vaccine IM > 6 months Valent IIV4 10/02/2015, 10/10/2016, 09/27/2017, 09/07/2018, 01/03/2019, 09/12/2019, 01/14/2020     Influenza,INJ,MDCK,PF,Quad >4yrs 08/20/2020     Pneumo Conj 13-V (2010&after) 10/02/2015     Pneumococcal 23 valent 05/15/2009, 10/10/2016     TD (ADULT, 7+) 10/04/2002     TDAP Vaccine (Adacel) 07/16/2010, 01/23/2020     Td (Adult), Adsorbed 10/13/1997, 10/04/2002     Zoster vaccine recombinant adjuvanted (SHINGRIX) 05/29/2019, 09/12/2019          Chart documentation done in part with Dragon Voice recognition Software. Although reviewed after completion, some word and grammatical error may remain.        Video-Visit Details    Type of service:  Video Visit    Video Start Time: 10:26 AM    Video End Time:10:38 AM    Originating Location (pt. Location): Home, [place]    Distant Location (provider location):  Home    Platform used for Video Visit: Sophia Baxter MD

## 2021-03-30 ENCOUNTER — VIRTUAL VISIT (OUTPATIENT)
Dept: GASTROENTEROLOGY | Facility: CLINIC | Age: 46
End: 2021-03-30
Payer: MEDICARE

## 2021-03-30 ENCOUNTER — OFFICE VISIT (OUTPATIENT)
Dept: PALLIATIVE MEDICINE | Facility: CLINIC | Age: 46
End: 2021-03-30
Payer: MEDICARE

## 2021-03-30 DIAGNOSIS — M48.061 SPINAL STENOSIS OF LUMBAR REGION WITHOUT NEUROGENIC CLAUDICATION: ICD-10-CM

## 2021-03-30 DIAGNOSIS — K59.00 CONSTIPATION, UNSPECIFIED CONSTIPATION TYPE: ICD-10-CM

## 2021-03-30 DIAGNOSIS — K31.84 GASTROPARESIS: Primary | ICD-10-CM

## 2021-03-30 DIAGNOSIS — M45.8 ANKYLOSING SPONDYLITIS OF SACRAL REGION (H): Primary | ICD-10-CM

## 2021-03-30 PROCEDURE — 97110 THERAPEUTIC EXERCISES: CPT | Mod: GP | Performed by: PHYSICAL THERAPIST

## 2021-03-30 PROCEDURE — 99442 PR PHYSICIAN TELEPHONE EVALUATION 11-20 MIN: CPT | Mod: 95 | Performed by: PHYSICIAN ASSISTANT

## 2021-03-30 PROCEDURE — 97112 NEUROMUSCULAR REEDUCATION: CPT | Mod: GP | Performed by: PHYSICAL THERAPIST

## 2021-03-30 RX ORDER — METOCLOPRAMIDE 5 MG/1
5 TABLET ORAL 4 TIMES DAILY PRN
COMMUNITY
End: 2023-10-03

## 2021-03-30 RX ORDER — PLECANATIDE 3 MG/1
3 TABLET ORAL DAILY
Qty: 30 TABLET | Refills: 3 | Status: SHIPPED | OUTPATIENT
Start: 2021-03-30 | End: 2021-04-19

## 2021-03-30 NOTE — PATIENT INSTRUCTIONS
Continue reglan for the next 1-2 months and then slowly taper off of medication.     Continue to treat your constipation. I have sent in a prescription for trulance for better control of your constipation. In the meantime please continue miralax daily.

## 2021-03-30 NOTE — PROGRESS NOTES
Joel Pineda is a 45 year old who is being evaluated via a billable telephone visit.      What phone number would you like to be contacted at? [phone]  How would you like to obtain your AVS? Minniehart   Virtually Roomed by: Ksenia Gil LPN on 3/30/21 at 12:33 PM    GASTROENTEROLOGY FOLLOW UP TELEPHONE VISIT    CC/REFERRING MD:    Ksenia Lyles    REASON FOR CONSULTATION: RECHECK (Follow up gastroparesis; recently hospitalized)      HISTORY OF PRESENT ILLNESS:    Joel Pineda is 45 year old male who presents for follow up. His pmhx is significant for dysautonomia, POTS, ankylosing spondylitis, DM, MDD, recurrent diverticulitis s/p partial resection.      He was diagnosed with gastroparesis via 4 hr GES in January 2020. Testing completed through [facility] and noted 58% of stomach contents at 4 hours per patient report. He was previously on reglan for the past 2 years for his symptoms. Over the past 2 years he notes that he did increase his dose from reglan 5mg to 10mg TID. He however was taken off of this several months ago due to prolonged use. After discontinuing reglan his symptoms were more difficult to control. We did start him on zofran to help with nausea and tried to start him on motegrity for his constipation. Unfortunately motegrity was not covered or costly and he was unable to try this medication.     Over last several months he has been trying to manage his symptoms with diet, antiemesis medications for nausea and miralax for his constipation.     About two weeks ago he developed worsening symptoms would led to a brief hospital admission at [facility]. He was restarted on reglan and his symptoms improved after a few days. He does admit that he has not been as consistent with taking miralax for constipation and this may have led to worsening symptoms of his gastroparesis.     He is now taking miralax daily for his constipation and this does allow for a BM about daily. He still does have  some straining and difficulty with stools.     He does have chronic pain syndrome and post herpetic neuralgia and is on chronic narcotic pain medication.      PMHx, PSHx, Social Hx, Family Hx have been reviewed.       ASSESSMENT/PLAN:    1. Gastroparesis  2. Constipation, unspecified constipation type  - plecanatide (TRULANCE) 3 MG tablet; Take 1 tablet (3 mg) by mouth daily  Dispense: 30 tablet; Refill: 3    Joel Pineda is a 45 year old male who presents for follow up. Patient with hx of gastroparesis and constipation with exacerbation of symptoms recently leading to brief hospital admission. Exacerbation seems to have been caused by worsening constipation. He has improved now since restarting reglan 5mg BID and taking miralax daily consistently. Agree with restarting reglan for short term use with goal of tapering off in the next 1-2 months due to risks/side effects. Again reviewed importance of treating constipation. He is currently on miralax which offers temporary relief. Will trial trulance for further improvement in his constipation.       Return in about 3 months (around 6/30/2021) for Follow up, using a video visit.    Thank you for this consultation.  It was a pleasure to participate in the care of this patient; please contact us with any further questions.      Sofía Wiggins PA-C  Gastroenterology  [facility]     Phone call duration: 19 minutes

## 2021-03-31 ENCOUNTER — HOSPITAL ENCOUNTER (OUTPATIENT)
Dept: BEHAVIORAL HEALTH | Facility: CLINIC | Age: 46
End: 2021-03-31
Attending: PSYCHIATRY & NEUROLOGY
Payer: MEDICARE

## 2021-03-31 PROCEDURE — 90853 GROUP PSYCHOTHERAPY: CPT | Mod: GT

## 2021-03-31 NOTE — PROGRESS NOTES
"Patient Name: Joel Pineda      YOB: 1975     Medical Record Number: [id number]  Diagnosis:    Ankylosing spondylitis of sacral region (H)  Spinal stenosis of lumbar region without neurogenic claudication         Visit Info Length of Visit: 55 (In person)  Arrived: On Time   Exercise/Activity Education Instruction:  Postural Training/Anatomy  Pacing/Energy Conservation  Home Program  Self Care  Activity:  Therapeutic Exercise 30':  Aerobic Conditioning (*see \"Strength/Functional Actitivities Record for details of exercises performed)  UBE x 6'  Xavier walk with postural correction and review of components of exercise/activity trang aerobic #4 with h/o  Stretching:  Upper Ext  Bilateral  Performed stretch for thor ext  Added KTC (supine) partial and standing calf  Postural Training:  static, dynamic  Added standing mid range heel raises, toe raises  Added lateral stepping and diagonal stepping with cues, emphasis on returning to home base in between each step  Continued ed and work on standing alignment for most stable, home base position - tripod feet, soft knees, neutral spine, thumbs fwd  Ongoing ed and emphasis on engaging in three principles of chronic pain PT      Neuro re-ed 15':  Ed and work on 'finding' neutral spine in prep for  movement  Diaphragm breathing cues throughout  Emphasis on 'breathe, posture, move' sequencing  Self care ed #3 with rec for hourly check ins and #3 h/o  Supine decompression and connection to self care, let muscles lengthen   Notes:  Pt is actively engaged in PT HEP and likes the chronic pain PT approach.  Slowly progressing toward goals.    Demonstrates/Verbalizes Technique:  3 (1= poor technique-difficulty performing exercises,significant cues required; 5= good technique-performs exercises without cues)  Body Awareness:   3 (1=low awareness; 5=high awareness)  Posture/Stability:  3 (1= poor posture, stability; 5= good posture, stability)   Motivational Level:   Ask " questions, Eager to learn and Cooperative Participation:  Full   Patient Satisfaction:  satisfied    Response to Teaching:   cooperative and needs reinforcement  Factors that affect learning: None   Plan of Care  Cont PT to support reactivation and integration of self regulation pain management skills.   Therapist: Evan Trevino, PT                 Date: 3/30/2021
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
## 2021-04-03 ENCOUNTER — MYC MEDICAL ADVICE (OUTPATIENT)
Dept: FAMILY MEDICINE | Facility: CLINIC | Age: 46
End: 2021-04-03

## 2021-04-03 ENCOUNTER — HEALTH MAINTENANCE LETTER (OUTPATIENT)
Age: 46
End: 2021-04-03

## 2021-04-03 ENCOUNTER — MYC MEDICAL ADVICE (OUTPATIENT)
Dept: GASTROENTEROLOGY | Facility: CLINIC | Age: 46
End: 2021-04-03

## 2021-04-07 ENCOUNTER — HOSPITAL ENCOUNTER (OUTPATIENT)
Dept: BEHAVIORAL HEALTH | Facility: CLINIC | Age: 46
End: 2021-04-07
Attending: PSYCHIATRY & NEUROLOGY
Payer: MEDICARE

## 2021-04-07 PROCEDURE — 90853 GROUP PSYCHOTHERAPY: CPT | Mod: PO | Performed by: SOCIAL WORKER

## 2021-04-07 NOTE — GROUP NOTE
Process Group Note    PATIENT'S NAME: Joel Pineda  MRN:   [id number]  :   1975  ACCT. NUMBER: [id number]  DATE OF SERVICE: 21  START TIME:  1:00 PM  END TIME:  1:50 PM  FACILITATOR: Magali Dodson LICSW  TOPIC:  Process Group    Diagnoses:  296.33 (F33.2) Major Depressive Disorder, Recurrent Episode, Severe _.  4. Other Diagnoses that is relevant to services:   300.00 (F41.9) Unspecified Anxiety Disorder  301.83 (F60.3) Borderline Personality Disorder.      [facility] Day Treatment  TRACK: Clinic One    NUMBER OF PARTICIPANTS: 7  Telemedicine Visit: The patient's condition can be safely assessed and treated via synchronous audio and visual telemedicine encounter.      Reason for Telemedicine Visit: Services only offered telehealth    Originating Site (Patient Location): Patient's home    Distant Site (Provider Location): Provider Remote Setting    Consent:  The patient/guardian has verbally consented to: the potential risks and benefits of telemedicine (video visit) versus in person care; bill my insurance or make self-payment for services provided; and responsibility for payment of non-covered services.     Mode of Communication:  Video Conference via Viibar    As the provider I attest to compliance with applicable laws and regulations related to telemedicine.     Client participated in educational session on anxiety disorders and managing anxiety.      Data:    Session content: At the start of this group, patients were invited to check in by identifying themselves, describing their current emotional status, and identifying issues to address in this group.   Area(s) of treatment focus addressed in this session included Symptom Management, Personal Safety and Community Resources/Discharge Planning.  Tito reported calm mood today.   He stated that he is enjoying the visit of his sister who lives out of town.  He stated that he enjoyed a special meal he  shopped for and prepared for her.  He stated that his appetite is improved.  He is actively working on anxiety management.  Client denied suicidal ideation, intent and plan.     Therapeutic Interventions/Treatment Strategies:  Psychotherapist offered support, feedback and validation and reinforced use of skills. Treatment modalities used include Cognitive Behavioral Therapy. Interventions include Coping Skills: Addressed barriers to utilizing coping skills when in distress.    Assessment:    Patient response:   Patient responded to session by accepting feedback, giving feedback, listening and focusing on goals    Possible barriers to participation / learning include: and no barriers identified    Health Issues:   None reported       Substance Use Review:   Substance Use: No active concerns identified.    Mental Status/Behavioral Observations  Appearance:   Appropriate   Eye Contact:   Good   Psychomotor Behavior: Normal   Attitude:   Cooperative  Friendly Pleasant  Orientation:   All  Speech   Rate / Production: Normal    Volume:  Normal   Mood:    Anxious  Depressed   Affect:    Appropriate   Thought Content:   Clear  Thought Form:  Coherent  Logical     Insight:    Good     Plan:     Safety Plan: No current safety concerns identified.  Recommended that patient call 911 or go to the local ED should there be a change in any of these risk factors.     Barriers to treatment: None identified    Patient Contracts (see media tab):  None    Substance Use: Not addressed in session     Continue or Discharge: Patient will continue in Adult Day Treatment (ADT)  as planned. Patient is likely to benefit from learning and using skills as they work toward the goals identified in their treatment plan.      Magali Dodson, [facility]  April 7, 2021

## 2021-04-08 ENCOUNTER — TELEPHONE (OUTPATIENT)
Dept: FAMILY MEDICINE | Facility: CLINIC | Age: 46
End: 2021-04-08

## 2021-04-08 DIAGNOSIS — Z86.19 HISTORY OF SHINGLES: ICD-10-CM

## 2021-04-08 NOTE — TELEPHONE ENCOUNTER
"PA for lidocaine (XYLOCAINE) 5 % external ointment    Login to [email or web address] and click \"Enter a Key\"    Key:  [id number]    Enter patients last name and     Complete the PA and click \"Send to Plan\"    PA or alternative    Farida James    "

## 2021-04-08 NOTE — TELEPHONE ENCOUNTER
Faxed to [facility] at [phone].  Patient informed it has been completed.    Holly Presley RN

## 2021-04-08 NOTE — TELEPHONE ENCOUNTER
Forms signed and forwarded back. Please fax.     Thank you,     Sofía Wiggins PA-C  Gastroenterology  [facility]

## 2021-04-08 NOTE — TELEPHONE ENCOUNTER
PA Initiation    Medication: lidocaine (XYLOCAINE) 5 % external ointment  Insurance Company: [employer] - Phone [phone] Fax [phone]  Pharmacy Filling the Rx: [id number] - [place], [place] - [address]  Filling Pharmacy Phone: [phone]  Filling Pharmacy Fax: [phone]  Start Date: 4/8/2021

## 2021-04-08 NOTE — TELEPHONE ENCOUNTER
PRIOR AUTHORIZATION DENIED    Medication: lidocaine (XYLOCAINE) 5 % external ointment    Denial Date: 4/8/2021    Denial Rationale: Medication is not covered for the associated diagnosis.      Appeal Information: If provider would like to appeal this decision we will need a detailed letter of medical necessity to start the process. Then re-route this request back to the PA pool.

## 2021-04-09 ENCOUNTER — TRANSFERRED RECORDS (OUTPATIENT)
Dept: HEALTH INFORMATION MANAGEMENT | Facility: CLINIC | Age: 46
End: 2021-04-09

## 2021-04-11 ENCOUNTER — MYC REFILL (OUTPATIENT)
Dept: FAMILY MEDICINE | Facility: CLINIC | Age: 46
End: 2021-04-11

## 2021-04-11 DIAGNOSIS — M45.8 ANKYLOSING SPONDYLITIS OF SACRAL REGION (H): ICD-10-CM

## 2021-04-11 DIAGNOSIS — M51.369 DDD (DEGENERATIVE DISC DISEASE), LUMBAR: ICD-10-CM

## 2021-04-12 RX ORDER — OXYCODONE HYDROCHLORIDE 5 MG/1
5-10 TABLET ORAL EVERY 6 HOURS PRN
Qty: 35 TABLET | Refills: 0 | Status: SHIPPED | OUTPATIENT
Start: 2021-04-12 | End: 2021-05-02

## 2021-04-13 DIAGNOSIS — M45.8 ANKYLOSING SPONDYLITIS OF SACRAL REGION (H): ICD-10-CM

## 2021-04-13 DIAGNOSIS — M51.369 DDD (DEGENERATIVE DISC DISEASE), LUMBAR: ICD-10-CM

## 2021-04-13 RX ORDER — NABUMETONE 500 MG/1
TABLET, FILM COATED ORAL
Qty: 60 TABLET | Refills: 2 | Status: SHIPPED | OUTPATIENT
Start: 2021-04-13 | End: 2021-06-17

## 2021-04-13 NOTE — TELEPHONE ENCOUNTER
Routing refill request to provider for review/approval because:  Labs not current:  AST  Creatinine   Date Value Ref Range Status   10/16/2020 1.28 (H) 0.66 - 1.25 mg/dL Final     Yohana Torres RN

## 2021-04-14 ENCOUNTER — NURSE TRIAGE (OUTPATIENT)
Dept: FAMILY MEDICINE | Facility: CLINIC | Age: 46
End: 2021-04-14

## 2021-04-14 ENCOUNTER — TELEPHONE (OUTPATIENT)
Dept: GASTROENTEROLOGY | Facility: CLINIC | Age: 46
End: 2021-04-14

## 2021-04-14 ENCOUNTER — IMMUNIZATION (OUTPATIENT)
Dept: NURSING | Facility: CLINIC | Age: 46
End: 2021-04-14
Attending: INTERNAL MEDICINE
Payer: MEDICARE

## 2021-04-14 PROCEDURE — 91300 PR COVID VAC PFIZER DIL RECON 30 MCG/0.3 ML IM: CPT

## 2021-04-14 PROCEDURE — 0002A PR COVID VAC PFIZER DIL RECON 30 MCG/0.3 ML IM: CPT

## 2021-04-14 NOTE — TELEPHONE ENCOUNTER
Spoke with patient he received his second Pfizer shot today.  He is experiencing some numbness on the left side of his neck. Hand also feels numb/tingly.   He states that he has chronic neck issues and polyneuropathy. For a couple of months he has had issues with trigeminal nerve.   He received the vaccine at 10 am. 4 pm is when he first began noticing symptoms, started with numbness in the hand.  His left arm feels restless.   He declines any worsening pain from baseline/normal. States that his neck is kind of angry at baseline.  He is able to form a fist with hand. He can raise arm and grasp items. No loss of sensation in that arm or discoloration of skin.   The injection site itself looks okay, no redness, swelling, pain.   Recommended alternating with ice/heat. He has already taken baclofen. Takes Lyrica and nabumetone scheduled. States that he has pain relievers if needed. He will try some ibuprofen in case there is any swelling going on. Will forward to PCP to advise. Recommended disposition per triage protocol is to be seen in office today. Will see if provider agrees that pt should be seen or recommends to continue monitoring at this point.    Advised ER if any chest pain, SOB, new weakness of face, arm, or leg on one side of the body, increasing swelling, loss of sensation/feeling in the extremity, or worsening numbness/tingling, new pain. Pt verbalized understanding.       Additional Information    Numbness or tingling in one or both hands is a chronic symptom (recurrent or ongoing problem lasting > 4 weeks)    Tingling (e.g., pins and needles) of the face, arm or leg on one side of the body, that is  present now    Negative: Difficult to awaken or acting confused (e.g., disoriented, slurred speech)    Negative: New neurologic deficit that is present NOW, sudden onset of ANY of the following: * Weakness of the face, arm, or leg on one side of the body * Numbness of the face, arm, or leg on one side of the  body * Loss of speech or garbled speech    Negative: Sounds like a life-threatening emergency to the triager    Negative: Confusion, disorientation, or hallucinations is the main symptom    Negative: Dizziness is the main symptom    Negative: Followed a head injury within last 3 days    Negative: Patient sounds very sick or weak to the triager    Negative: Headache (with neurologic deficit)    Negative: Unable to urinate (or only a few drops) and bladder feels very full    Negative: Loss of control of bowel or bladder (i.e., incontinence) of new onset    Negative: Back pain with numbness (loss of sensation) in groin or rectal area    Negative: Neurologic deficit that was brief (now gone), ANY of the following: * Weakness of the face, arm, or leg on one side of the body * Numbness of the face, arm, or leg on one side of the body * Loss of speech or garbled speech    Negative: Neurologic deficit of gradual onset, ANY of the following: * Weakness of the face, arm, or leg on one side of the body * Numbness of the face, arm, or leg on one side of the body * Loss of speech or garbled speech    Negative: Ten Sleep palsy suspected (i.e., weakness only one side of the face, developing over hours to days, no other symptoms)    Protocols used: NEUROLOGIC DEFICIT-A-OH    Lashell Bolanos RN  [facility]

## 2021-04-14 NOTE — TELEPHONE ENCOUNTER
Reason for call:  Symptom   Symptom or request: After COVID shot reactions of Numbness and neck numbness    Duration (how long have symptoms been present): today  Have you been treated for this before? No    Additional comments: Warm Transferred the patient to a Nurse    Phone number to reach patient:  Cell number on file:    Telephone Information:   Mobile [phone]   Best Time:      Can we leave a detailed message on this number?  Not Applicable    Travel screening: Not Applicable

## 2021-04-14 NOTE — TELEPHONE ENCOUNTER
Writer called [facility] at [phone] talk about application status for patient's medication for Trulance.    Writer spoke to Betsy who told writer that the application has not been received from [phone].    Writer [facility]ed another [facility] at Fax number [phone].  Praneeth Colvin CMA

## 2021-04-15 NOTE — TELEPHONE ENCOUNTER
He can continue to monitor.  His symptoms are not related to his vaccine.  I cannot think of his single thing to tie the vaccine in 2 issues of numbness.

## 2021-04-17 ENCOUNTER — NURSE TRIAGE (OUTPATIENT)
Dept: NURSING | Facility: CLINIC | Age: 46
End: 2021-04-17

## 2021-04-18 NOTE — TELEPHONE ENCOUNTER
Patient calling - says every once in a while fluttering in chest or palpitation - occurring once every 2 months or less.  He says the last couple days it has been happening a lot more often.  Is happening every 3-4 hours and lasts just a few seconds.  Says it occurs more often with activity.   He has a monitor at home and says his pulse has been running 65-80 and oxygen saturation is at 98%.  Also feels like he has a 'lump in his throat.'  But says he only noticed that when I asked him if he is feeling short of breath, dizzy or lightheaded.    No difficulty breathing.  No chest pain. No unusual sweating.  No dizziness or lightheadedness.    Triaged to disposition of See HCP Within 4 Hours (or PCP Triage).  Per current nurse triage protocol, on-call provider will be paged for second level triage.    9:22 pm called [facility] page operators ([employer]) to place page to on-call provider, Dr. Houston Koroma as no pager is listed in Smart Web.  Per Dr. Koroma - patient should go to ED now for evaluation.  9:28 pm called patient back and relayed recommendation from Dr. Koroma.  Patient will head to ED now.    Taina Sam, RN  Triage Nurse Advisor    COVID 19 Nurse Triage Plan/Patient Instructions    Please be aware that novel coronavirus (COVID-19) may be circulating in the community. If you develop symptoms such as fever, cough, or SOB or if you have concerns about the presence of another infection including coronavirus (COVID-19), please contact your health care provider or visit [email or web address].     Disposition/Instructions    ED Visit recommended. Follow protocol based instructions.     Bring Your Own Device:  Please also bring your smart device(s) (smart phones, tablets, laptops) and their charging cables for your personal use and to communicate with your care team during your visit.    Thank you for taking steps to prevent the spread of this virus.  o Limit your contact with others.  o Wear a simple mask to cover  your cough.  o Wash your hands well and often.    Resources    [facility]: About COVID-19: [email or web address]    CDC: What to Do If You're Sick: www.cdc.gov/coronavirus/2019-ncov/about/steps-when-sick.html    CDC: Ending Home Isolation: www.cdc.gov/coronavirus/2019-ncov/hcp/disposition-in-home-patients.html     CDC: Caring for Someone: www.cdc.gov/coronavirus/2019-ncov/if-you-are-sick/care-for-someone.html     [facility]: Interim Guidance for Hospital Discharge to Home: www.health.[facility].[place]/diseases/coronavirus/hcp/hospdischarge.pdf    [facility] clinical trials (COVID-19 research studies): [email or web address]-trials     Below are the COVID-19 hotlines at the [place] Department of Health ([facility]). Interpreters are available.   o For health questions: Call 256-806-5595 or 1-526.206.8289 (7 a.m. to 7 p.m.)  o For questions about schools and childcare: Call 879-866-0733 or 1-526.116.7698 (7 a.m. to 7 p.m.)   Additional Information    Negative: Passed out (i.e., lost consciousness, collapsed and was not responding)    Negative: Shock suspected (e.g., cold/pale/clammy skin, too weak to stand, low BP, rapid pulse)    Negative: Difficult to awaken or acting confused (e.g., disoriented, slurred speech)    Negative: Visible sweat on face or sweat dripping down face    Negative: Unable to walk, or can only walk with assistance (e.g., requires support)    Negative: [1] Received SHOCK from implantable cardiac defibrillator AND [2] persisting symptoms (i.e., palpitations, lightheadedness)    Negative: Sounds like a life-threatening emergency to the triager    Negative: Chest pain    Negative: Difficulty breathing    Negative: Dizziness, lightheadedness, or weakness    Negative: [1] Heart beating very rapidly (e.g., > 140 / minute) AND [2] present now  (Exception: during exercise)    Negative: Heart beating very slowly (e.g., < 50 / minute)  (Exception: athlete)    Negative: Patient  sounds very sick or weak to the triager    Negative: New or worsened shortness of breath with activity (dyspnea on exertion)    Negative: [1] Heart beating very rapidly (e.g., > 140 / minute) AND [2] not present now  (Exception: during exercise)    [1] Skipped or extra beat(s) AND [2] increases with exercise or exertion    Protocols used: HEART RATE AND HEARTBEAT [id number]

## 2021-04-19 ENCOUNTER — PATIENT OUTREACH (OUTPATIENT)
Dept: CARE COORDINATION | Facility: CLINIC | Age: 46
End: 2021-04-19

## 2021-04-19 ENCOUNTER — OFFICE VISIT (OUTPATIENT)
Dept: OPHTHALMOLOGY | Facility: CLINIC | Age: 46
End: 2021-04-19
Payer: MEDICARE

## 2021-04-19 ENCOUNTER — PATIENT OUTREACH (OUTPATIENT)
Dept: NURSING | Facility: CLINIC | Age: 46
End: 2021-04-19
Payer: MEDICARE

## 2021-04-19 DIAGNOSIS — E11.8 TYPE 2 DIABETES MELLITUS WITH COMPLICATION, WITHOUT LONG-TERM CURRENT USE OF INSULIN (H): ICD-10-CM

## 2021-04-19 DIAGNOSIS — M51.369 DDD (DEGENERATIVE DISC DISEASE), LUMBAR: ICD-10-CM

## 2021-04-19 DIAGNOSIS — G62.9 NEUROPATHY: ICD-10-CM

## 2021-04-19 DIAGNOSIS — H04.129 DRY EYE: ICD-10-CM

## 2021-04-19 DIAGNOSIS — Z71.89 OTHER SPECIFIED COUNSELING: Primary | Chronic | ICD-10-CM

## 2021-04-19 DIAGNOSIS — H10.13 ALLERGIC CONJUNCTIVITIS OF BOTH EYES: Primary | ICD-10-CM

## 2021-04-19 DIAGNOSIS — H52.13 MYOPIA OF BOTH EYES: ICD-10-CM

## 2021-04-19 DIAGNOSIS — M45.8 ANKYLOSING SPONDYLITIS OF SACRAL REGION (H): ICD-10-CM

## 2021-04-19 PROCEDURE — 99214 OFFICE O/P EST MOD 30 MIN: CPT | Performed by: OPHTHALMOLOGY

## 2021-04-19 RX ORDER — PREGABALIN 150 MG/1
150 CAPSULE ORAL 3 TIMES DAILY
Qty: 90 CAPSULE | Refills: 5 | Status: SHIPPED | OUTPATIENT
Start: 2021-04-19 | End: 2021-04-19

## 2021-04-19 RX ORDER — OLOPATADINE HYDROCHLORIDE 2 MG/ML
1 SOLUTION/ DROPS OPHTHALMIC DAILY
Qty: 2.5 ML | Refills: 3 | Status: SHIPPED | OUTPATIENT
Start: 2021-04-19 | End: 2021-09-03

## 2021-04-19 RX ORDER — PREGABALIN 150 MG/1
150 CAPSULE ORAL 3 TIMES DAILY
Qty: 90 CAPSULE | Refills: 5 | Status: SHIPPED | OUTPATIENT
Start: 2021-04-19 | End: 2021-08-17

## 2021-04-19 RX ORDER — QUETIAPINE FUMARATE 100 MG/1
100 TABLET, FILM COATED ORAL AT BEDTIME
COMMUNITY
End: 2021-09-17

## 2021-04-19 ASSESSMENT — VISUAL ACUITY
METHOD: SNELLEN - LINEAR
CORRECTION_TYPE: GLASSES
OS_CC+: -1
OS_CC: 20/20
OD_CC: 20/20

## 2021-04-19 ASSESSMENT — CUP TO DISC RATIO
OS_RATIO: 0.2
OD_RATIO: 0.25

## 2021-04-19 ASSESSMENT — SLIT LAMP EXAM - LIDS
COMMENTS: MILD MGD
COMMENTS: MILD MGD

## 2021-04-19 ASSESSMENT — REFRACTION_WEARINGRX
OS_AXIS: 100
OS_ADD: +1.25
OS_SPHERE: -4.75
SPECS_TYPE: PAL
OD_AXIS: 030
OD_SPHERE: -4.00
OS_CYLINDER: +1.75
OD_ADD: +1.25
OD_CYLINDER: +1.00

## 2021-04-19 ASSESSMENT — TONOMETRY
OS_IOP_MMHG: 10
OD_IOP_MMHG: 10
IOP_METHOD: ICARE

## 2021-04-19 ASSESSMENT — CONF VISUAL FIELD
OD_NORMAL: 1
METHOD: COUNTING FINGERS
OS_NORMAL: 1

## 2021-04-19 ASSESSMENT — EXTERNAL EXAM - LEFT EYE: OS_EXAM: NORMAL

## 2021-04-19 ASSESSMENT — EXTERNAL EXAM - RIGHT EYE: OD_EXAM: NORMAL

## 2021-04-19 NOTE — NURSING NOTE
Chief Complaints and History of Present Illnesses   Patient presents with     Diabetic Eye Exam       Chief Complaint(s) and History of Present Illness(es)     Diabetic Eye Exam     Vision: is blurred for distance    Diabetes Type: Type 2              Comments     Patient here for diabetic eye exam. Notes intermittent blurred vision but feels that could be related to being on Lyrica. Uses flonase prn when allergies flare up, feels this helps his eyes itch less. ATs usually twice daily, both eyes.  Type 2 diabetic, controlled with glipizide. Doesn't check blood sugars, just A1C.  Lab Results       Component                Value               Date                       A1C                      6.5                 02/10/2021                 A1C                      6.0                 10/16/2020                 A1C                      6.1                 08/18/2020                 A1C                      6.0                 01/24/2020                 A1C                      5.9                 09/13/2019                            Dianne Velasquez, COA

## 2021-04-19 NOTE — PROGRESS NOTES
Medication Therapy Management (MTM) Encounter    ASSESSMENT:                            Medication Adherence/Access: No issues identified    Mood/Anxiety/Insomnia: Stable; follow closely with psychiatry    Hypertension: Stable    Pain: Stable; patient followed closely by pain clinic    Nausea: Stable; following with GI    Diabetes: Stable. Recommend eating at regular intervals. If low blood sugars continue could consider decreasing glipizide dose. DId discuss alternative options for blood sugar management, GLP-1 agonists would not be ideal due to gastroparesis.    PLAN:                          No medication changes recommended today.    Follow-up: 10 weeks    SUBJECTIVE/OBJECTIVE:                          Joel Pineda is a 45 year old male called for a follow up from 2/9/2021. He was referred to me from Ksenia Lyles.      Reason for visit: Trulance start    Allergies/ADRs: Reviewed in chart  Tobacco: He reports that he has never smoked. He has never used smokeless tobacco.  Alcohol: none  Caffeine: 36 ounces/day of coffee  Activity: None  Past Medical History: Reviewed in chart    Medication Adherence/Access: no issues reported    Ankylosing Spondylitis: Current medications include Enbrel 50mg weekly. Patient was switched from Humira to Enbrel because of headaches and burning mouth. Patient follows with Dr. Baxter, Rheumatology. Patient is unsure if Enbrel is helpful. Before Enbrel injection he will take famotidine 20mg to prevent any reactions.      Mood/Anxiety/Insomnia: current therapy includes lamictal 200mg daily, duloxetine 60mg twice daily, clonazepam 0.5mg at bedtime, quetiapine 100mg at bedtime,  melatonin 9mg at bedtime.  Patient's psychiatrist is Dr. Rodriguez at [facility] in [place]. Transferred to another therapist that does some chronic pain and trauma work. Has been doing more meditation.  Patient is in an IOP program and next week is his last week but he may add  "another 2 weeks. If he doesn't do 2 week extension he will enroll in an after care program. Patient does feel sedated with his current medications but feels this is a necessary evil. Met with Dr. Laguerre today as part of [facility] program while he attending group.     Hypertension: Current medications include metoprolol XL 200mg twice daily, ramipril 10mg daily. Patient does not self-monitor BP but has a machine.  Patient does have some minor light headedness.  BP Readings from Last 3 Encounters:   03/15/21 132/82   02/26/21 (!) 152/91   02/11/21 (!) 140/80     Pain: current therapy includes APAP as needed-patient is taking  1000 mg 2 times daily, lyrica 150mg three times daily (dose was increased when patient was off of Enbrel due to infections; he has restarted Enbrel and is hoping to decrease lyrica use with the restarting of Enbrel), oxycodone 5-10mg every 6 hours as needed maximum of 4 tablets/day (taking 1-2 tablets a day), baclofen 5-10mg twice daily, nabumetone 500-1000mg twice daily (has been taking 1000mg twice daily), Narcan nasal spray as needed.     Nausea: current therapy includes ondansetron ODT 8mg every 8 hours as needed. Nausea has been well controlled. Using nausea about once a week. Back on reglan. He was hospitalized at [facility] for 3 days. Patient met with GI and the medications that are prescribed aren't covered or he hasn't been able to tolerate. He did try Trulance but it increased motility too much \"like a preprocedure cleanse\". Patient would prefer stay on miralax. Patient is taking miralax 8.5gm twice daily, reglan 10mg in the morning and 5mg at night. Patient is aware of the risks associated with long term use of reglan. Plan is to try and taper after reglan for a month. Has appt with [facility] early May.    Diabetes:  Pt currently taking glipizide xl 5mg daily. Pt is not experiencing side effects.  SMBG: rarely.     Patient was having more episodes of hypoglycemia because he wasn't eating " regularly. Patient was getting shaky and sweating.   Recent symptoms of high blood sugar? none  Eye exam: due  Foot exam: due  ACEi/ARB: Yes: Ramipril.   Urine Albumin:   Lab Results   Component Value Date    UMALCR 21.38 (H) 01/24/2020     Hemoglobin A1C   Date Value Ref Range Status   02/10/2021 6.5 (H) 0 - 5.6 % Final     Comment:     Normal <5.7% Prediabetes 5.7-6.4%  Diabetes 6.5% or higher - adopted from ADA   consensus guidelines.        Aspirin: Taking 81mg daily and denies side effects    Today's Vitals: There were no vitals taken for this visit.  ----------------    I spent 30 minutes with this patient today. All changes were made via collaborative practice agreement with Ksenia Lyles . A copy of the visit note was provided to the patient's primary care provider.    The patient was sent via [employer] a summary of these recommendations.     Radha Mcdonald, Pharm.D, BCACP  Medication Therapy Management Pharmacist    Telemedicine Visit Details  Type of service:  Telephone visit  Start Time: 2:32 PM  End Time: 3:02PM  Originating Location (patient location): Home  Distant Location (provider location):  [facility] MTM      Medication Therapy Recommendations  No medication therapy recommendations to display

## 2021-04-19 NOTE — TELEPHONE ENCOUNTER
Yes, I certainly think that would be just fine.  We have gone up and down periodically in the past and I have no trouble with doing it.  I can change the prescription to reflect this.  I will write it is 3 times a day and he can dose it accordingly.

## 2021-04-19 NOTE — TELEPHONE ENCOUNTER
Reason for Call:  Other call back    Detailed comments: Patient would like to know if he can increase his Lyrica? He said it is painful to walk sometimes. He has been taking Lyrica once in the morning. He is wondering he can take 1 pill in the morning and 2 at night?    Patient said if he misses th call can you leave instructions on how take medications if he his able to increase medication.    Phone Number Patient can be reached at: Cell number on file:    Telephone Information:   Mobile [phone]       Best Time: Any    Can we leave a detailed message on this number? YES    Call taken on 4/19/2021 at 12:24 PM by Geovanna Hamm MA

## 2021-04-19 NOTE — TELEPHONE ENCOUNTER
Called pt, and because he is saupposed to get this through the patient assistance program, the Rx needs to [facility]. Could you please re-send Rx there? Pharmacy pended.

## 2021-04-19 NOTE — PROGRESS NOTES
Clinic Care Coordination Contact  Community Health Worker Initial Outreach       CHW Additional Questions  If ED/Hospital discharge, follow-up appointment scheduled as recommended?: No  Patient agreeable to assistance with scheduling?: No  Patient declined (specify): patient will make an appointment if needed.  Medication changes made following ED/Hospital discharge?: No  MyChart active?: Yes  Patient sent Social Determinants of Health questionnaire?: No    Patient accepts CC: No, Patient declined CCC. Patient has a question for PCP. CHW will send  message.. Patient will be sent Care Coordination introduction letter for future reference.     Reason for Referral: Care Transition: ED to outpatient

## 2021-04-19 NOTE — PROGRESS NOTES
Clinic Care Coordination Contact  [facility]/Voicemail       Clinical Data: CHW Outreach  Outreach attempted x 1. Left message on patient's voicemail with call back information and requested return call.    Plan: CHW will try to reach patient again in 1-2 business days.    Geovanna AGUILAR Community Health Worker  Clinic Care Coordination  [facility] [place], [place] [facility] [place]  Phone: [phone]    Reason for Referral: Care Transition: ED to outpatient    Notes:    - Seen in the ER on 04/17/21    - Any questions for CC RN    - Do you need to  schedule follow up with PCP in 3 days?

## 2021-04-20 ENCOUNTER — VIRTUAL VISIT (OUTPATIENT)
Dept: PHARMACY | Facility: CLINIC | Age: 46
End: 2021-04-20
Payer: COMMERCIAL

## 2021-04-20 ENCOUNTER — HOSPITAL ENCOUNTER (OUTPATIENT)
Dept: BEHAVIORAL HEALTH | Facility: CLINIC | Age: 46
End: 2021-04-20
Attending: PSYCHIATRY & NEUROLOGY
Payer: MEDICARE

## 2021-04-20 DIAGNOSIS — F33.3 SEVERE EPISODE OF RECURRENT MAJOR DEPRESSIVE DISORDER, WITH PSYCHOTIC FEATURES (H): ICD-10-CM

## 2021-04-20 DIAGNOSIS — G89.4 CHRONIC PAIN SYNDROME: ICD-10-CM

## 2021-04-20 DIAGNOSIS — F41.9 ANXIETY: ICD-10-CM

## 2021-04-20 DIAGNOSIS — M45.8 ANKYLOSING SPONDYLITIS OF SACRAL REGION (H): ICD-10-CM

## 2021-04-20 DIAGNOSIS — E11.9 TYPE 2 DIABETES MELLITUS WITHOUT COMPLICATION, WITHOUT LONG-TERM CURRENT USE OF INSULIN (H): Primary | ICD-10-CM

## 2021-04-20 DIAGNOSIS — R11.0 NAUSEA: ICD-10-CM

## 2021-04-20 PROCEDURE — 99214 OFFICE O/P EST MOD 30 MIN: CPT | Mod: 95 | Performed by: PSYCHIATRY & NEUROLOGY

## 2021-04-20 PROCEDURE — 99607 MTMS BY PHARM ADDL 15 MIN: CPT | Performed by: PHARMACIST

## 2021-04-20 PROCEDURE — 99606 MTMS BY PHARM EST 15 MIN: CPT | Performed by: PHARMACIST

## 2021-04-20 NOTE — PROGRESS NOTES
"[facility]   Adult Day Treatment, Followup Note    PATIENT'S NAME: Joel Pineda  MRN:   [id number]  :   1975  ACCT. NUMBER: [id number]  DATE OF SERVICE: 21         Interim History:     The patient is a 46yo male with a history of depression and anxiety who is seen for day program followup. Says that he is tired because he adopted two kittens. Working on paperwork for [employer] and it's been harder due to lack of sleep. Some anxiety this weekend. Did call the on call line. Was having some heart fluttering so went to the ED. Had him on a 12-lead EKG but didn't happen while he was there. Still happening at times. Didn't hurt. Maybe due to stress and pain (which has been \"crazy intense.\") Did have an increase in Lyrica. Was thrilled about adopting the kittens. They are five-months old. Did have a little slump on . Curfew was in place so he was worried about coming home from the ED. Pain was making him feel \"frantic\" and \"a little nuts.\" Has been going to PT. Some relief from increase in Lyrica. Did get vaccine number two and has had a little more in terms of side effects. Says that he had some SI after having to wait so long at the ED. Feels safe at home and says that he wouldn't do anything \"because I have kittens to take care of.\" Says that he doesn't feel he was at the point where he needed to come in. Is feeling overwhelmed but no plans or urges. Nicer weather can really help him. Feels safe in outpatient program. Discussed resources such as [facility]. Didn't go to groups last week after vaccination. It's been good support. It's been hard for him when people leave. Will touch base with his previous DBT therapist.          Medications:   Cymbalta 60mg BID  Lamictal 200mg at bedtime.   Klonopin 0.5mg at bedtime and 0.5mg Qday PRN.   Seroquel 100mg Qhs  Melatonin PRN    Current Pain Relevant Medications:    Oxycodone 5 mg PRN chronic pain, occasional for " PHN  Lyrica increased to 150 mg 3 times daily  Relafen 500 mg, 1 to 2 tablets daily  Lidocaine, Capsaicin topically 2-3 times daily  Xanax 0.5 mg in afternoon, 1 mg at HS    Current Outpatient Medications   Medication     acetaminophen 500 MG CAPS     albuterol (PROAIR HFA/PROVENTIL HFA/VENTOLIN HFA) 108 (90 Base) MCG/ACT inhaler     albuterol (PROVENTIL) (2.5 MG/3ML) 0.083% neb solution     aspirin (ASA) 81 MG tablet     baclofen (LIORESAL) 10 MG tablet     cetirizine (ZYRTEC) 10 MG tablet     cholecalciferol (VITAMIN D3) 46759 units (1250 mcg) capsule     clonazePAM (KLONOPIN) 0.5 MG tablet     cyanocobalamin (CYANOCOBALAMIN) 1000 MCG/ML injection     DULoxetine (CYMBALTA) 60 MG capsule     EPINEPHrine (ANY BX GENERIC EQUIV) 0.3 MG/0.3ML injection 2-pack     etanercept (ENBREL SURECLICK) 50 MG/ML autoinjector     famotidine (PEPCID) 20 MG tablet     fluticasone (FLONASE) 50 MCG/ACT nasal spray     fluticasone-salmeterol (ADVAIR) 500-50 MCG/DOSE inhaler     glipiZIDE (GLUCOTROL XL) 5 MG 24 hr tablet     lamoTRIgine (LAMICTAL) 100 MG tablet     levothyroxine (SYNTHROID/LEVOTHROID) 75 MCG tablet     lidocaine (XYLOCAINE) 5 % external ointment     melatonin 3 MG tablet     metoclopramide (REGLAN) 5 MG tablet     metoprolol succinate ER (TOPROL-XL) 200 MG 24 hr tablet     montelukast (SINGULAIR) 10 MG tablet     nabumetone (RELAFEN) 500 MG tablet     naloxone (NARCAN) 4 MG/0.1ML nasal spray     olopatadine (PATADAY) 0.2 % ophthalmic solution     omega-3 acid ethyl esters (LOVAZA) 1 g capsule     omeprazole 20 MG tablet     order for DME     order for DME     order for DME     oxyCODONE (ROXICODONE) 5 MG tablet     polyethylene glycol (MIRALAX) 17 g packet     pregabalin (LYRICA) 150 MG capsule     QUEtiapine (SEROQUEL) 100 MG tablet     ramipril (ALTACE) 10 MG capsule     rizatriptan (MAXALT-MLT) 5 MG ODT     rosuvastatin (CRESTOR) 40 MG tablet     syringe, disposable, (BD TUBERCULIN SYRINGE) 1 ML MISC     vitamin B  complex with vitamin C (STRESS TAB) tablet     ZINC SULFATE-VITAMIN C MT     No current facility-administered medications for this visit.            Allergies:     Allergies   Allergen Reactions     Amoxicillin-Pot Clavulanate Difficulty breathing     Banana Shortness Of Breath     Pt reports organic Banana is okay.      Nitroglycerin Palpitations     Penicillins Anaphylaxis     Provigil [Modafinil] Shortness Of Breath     headache     Gadolinium Hives and Itching     Patient was premedicated for the contrast allergy. He did still have a reaction a few hours after injection. Hives and itching. Dr. Gomez told tech to inform pt he should only have contrast again in the future when premedicated and at a hospital. Not at an outpatient facility.      Ketoconazole      Topical cream caused swelling and itching     Dye [Contrast Dye] Other (See Comments) and Hives     Moderate flushing, CT contrast     Golimumab      Hives, bradycardia, face swelling     Neurontin [Gabapentin] Hives     Moderate hives     Nortriptyline Hives     Varicella Virus Vaccine Live      Rash     Flagyl [Metronidazole Hcl] Palpitations and Hives     Latex Rash     Metronidazole Palpitations, Other (See Comments) and Rash     dizziness (versus ciprofloxacin taken at same time)     No Clinical Screening - See Comments Rash     Nitrile gloves          Labs:   No results found for this or any previous visit (from the past 24 hour(s)).       Psychiatric Examination:     PHQ-9 scores:   PHQ-9 SCORE 12/1/2020 2/26/2021 3/5/2021   PHQ-9 Total Score - - -   PHQ-9 Total Score MyChart 17 (Moderately severe depression) - 12 (Moderate depression)   PHQ-9 Total Score 17 11 12   PHQ-9 Total Score - - -     YUDI-7 scores:    YUDI-7 SCORE 12/1/2020 2/26/2021 3/5/2021   Total Score - - -   Total Score 13 (moderate anxiety) - 8 (mild anxiety)   Total Score 13 16 8   Total Score BEH Adult - - -       Risk status (Self / Other harm or suicidal ideation)  Patient  "has had a history of self-injurious behavior: stabbing with needles in the past  Patient denies current fears or concerns for personal safety.  Patient reports the following current or recent suicidal ideation or behaviors: did have some SI this past weekend.  Patient denies current or recent homicidal ideation or behaviors.  Patient denies current or recent self injurious behavior or ideation.  Patient denies other safety concerns.  A safety and risk management plan has not been developed at this time, however patient was encouraged to call [place] [phone] should there be a change in any of these risk factors.    Appearance: awake, alert and adequately groomed  Attitude:  cooperative  Eye Contact:  good  Mood:  \"stressed\"  Affect:  mood congruent  Speech:  clear, coherent  Psychomotor Behavior:  no evidence of tardive dyskinesia, dystonia, or tics  Thought Process:  goal oriented  Associations:  no loose associations  Thought Content:  no evidence of suicidal ideation or homicidal ideation and no evidence of psychotic thought Some recent SI but feels safe at home. No urges or plans.   Insight:  fair  Judgement:  intact  Oriented to:  time, person, and place  Attention Span and Concentration:  intact  Recent and Remote Memory:  fair           Diagnoses:     MDD, recurrent, moderate versus bipolar disorder, mixed type  YUDI  Borderline PD per history         Assessment and Plan:     Treatment Objective(s) Addressed in This Session:  The purpose of today's call is for this author to provide oversight of patient's care while receiving program services. Specific treatment goals addressed included personal safety, symptoms stabilization and management, wellness and mental health, and community resources/discharge planning.     1) Continue medications as above.      This author will follow up with the patient in approximately 30 days.    Patient continues to meet criteria for recommended level of care: in day " program  Patient would be at reasonable risk of requiring a higher level of care in the absence of current services.    Patient does agree with the current plan of care.    Jaycob Boland MD  4/20/21      Video-Visit Details    Type of service:  Video Visit    Video Start Time (time video started): 1255    Video End Time (time video stopped): 1311    Originating Location (pt. Location): Home    Distant Location (provider location): Provider remote location    Mode of Communication:  Video Conference via [facility]    Physician has received verbal consent for a Video Visit from the patient? Yes    Entered by: Jaycob Boland on 4/20/21 at 6:09 AM

## 2021-04-20 NOTE — PATIENT INSTRUCTIONS
Recommendations from today's MTM visit:                                                    Today we reviewed what your medicines are for, how to know if they are working, that your medicines are safe and how to make your medicine regimen as easy as possible.      No medication changes recommended today.    Follow-up: Return in about 10 weeks (around 6/29/2021) for Medication Therapy Management Visit.    It was great to speak with you today.  I value your experience and would be very thankful for your time with providing feedback on our clinic survey. You may receive a survey via email or text message in the next few days.     To schedule another MTM appointment, please call the clinic directly or you may call the MTM scheduling line at [phone] or toll-free at [phone].     My Clinical Pharmacist's contact information:                                                      Please feel free to contact me with any questions or concerns you have.      Radha Mcdonald, Pharm.D, BCACP  Medication Therapy Management Pharmacist

## 2021-04-21 ENCOUNTER — TELEPHONE (OUTPATIENT)
Dept: GASTROENTEROLOGY | Facility: CLINIC | Age: 46
End: 2021-04-21

## 2021-04-21 ENCOUNTER — HOSPITAL ENCOUNTER (OUTPATIENT)
Dept: BEHAVIORAL HEALTH | Facility: CLINIC | Age: 46
End: 2021-04-21
Attending: PSYCHIATRY & NEUROLOGY
Payer: MEDICARE

## 2021-04-21 PROCEDURE — 90853 GROUP PSYCHOTHERAPY: CPT | Mod: PO | Performed by: SOCIAL WORKER

## 2021-04-21 NOTE — TELEPHONE ENCOUNTER
Writer called [employer] to check status of medication payment assistance.   Writer was told by electronic correspondence that the patient has been denied due to yearly income.  Writer looked in chart and found that the patient in an encounter on 4/19 has had Trulance taken off of  patient's medication list.  Praneeth Colvin, [facility]

## 2021-04-21 NOTE — GROUP NOTE
Process Group Note    PATIENT'S NAME: Joel Pineda  MRN:   [id number]  :   1975  ACCT. NUMBER: [id number]  DATE OF SERVICE: 21  START TIME:  1:00 PM  END TIME:  1:50 PM  FACILITATOR: Magali Dodson LICSW  TOPIC:  Process Group    Diagnoses:  296.33 (F33.2) Major Depressive Disorder, Recurrent Episode, Severe _.  4. Other Diagnoses that is relevant to services:   300.00 (F41.9) Unspecified Anxiety Disorder  301.83 (F60.3) Borderline Personality Disorder.      [facility] Mental Health Day Treatment  TRACK: [facility]    NUMBER OF PARTICIPANTS: 4    The client also participated in an educational discussion on motivation and procrastination.    Telemedicine Visit: The patient's condition can be safely assessed and treated via synchronous audio and visual telemedicine encounter.      Reason for Telemedicine Visit: Services only offered telehealth    Originating Site (Patient Location): Patient's home    Distant Site (Provider Location): Provider Remote Setting    Consent:  The patient/guardian has verbally consented to: the potential risks and benefits of telemedicine (video visit) versus in person care; bill my insurance or make self-payment for services provided; and responsibility for payment of non-covered services.     Mode of Communication:  Video Conference via [employer]    As the provider I attest to compliance with applicable laws and regulations related to telemedicine.           Data:    Session content: At the start of this group, patients were invited to check in by identifying themselves, describing their current emotional status, and identifying issues to address in this group.   Area(s) of treatment focus addressed in this session included Symptom Management, Personal Safety and Community Resources/Discharge Planning.  Tito reported excitement over adopting two kittens from a rescue group.  He reported steps he is taking to help the cats adjust to his home and [facility]   He reported interest in the new [facility] program as an alternative to regular inpatient care.   He stated that he is having increased pain after being off his pain medication for one month to promote immune response to the COVID vaccination.  He stated that he is now complete in the immunizations and is returning to the medication.  Client denied suicidal ideation, intent and plan. Mood was anxious and low.      Therapeutic Interventions/Treatment Strategies:  Psychotherapist offered support, feedback and validation and reinforced use of skills. Treatment modalities used include Cognitive Behavioral Therapy. Interventions include Behavioral Activation: Reinforced benefits/challenges of change process through applying skills to replace unwanted behaviors.    Assessment:    Patient response:   Patient responded to session by accepting feedback, giving feedback, listening and focusing on goals    Possible barriers to participation / learning include: and no barriers identified    Health Issues:   None reported       Substance Use Review:   Substance Use: No active concerns identified.    Mental Status/Behavioral Observations  Appearance:   Appropriate   Eye Contact:   Good   Psychomotor Behavior: Normal   Attitude:   Cooperative   Orientation:   All  Speech   Rate / Production: Normal    Volume:  Normal   Mood:    Anxious  Depressed   Affect:    Appropriate   Thought Content:   Clear  Thought Form:  Coherent  Logical     Insight:    Good     Plan:     Safety Plan: No current safety concerns identified.  Recommended that patient call 911 or go to the local ED should there be a change in any of these risk factors.     Barriers to treatment: None identified    Patient Contracts (see media tab):  None    Substance Use: Not addressed in session     Continue or Discharge: Patient will continue in Adult Day Treatment (ADT)  as planned. Patient is likely to benefit from learning and using  skills as they work toward the goals identified in their treatment plan.      Magali Dodson, [facility]  April 21, 2021

## 2021-04-23 DIAGNOSIS — Z00.6 PATIENT IN CLINICAL RESEARCH STUDY: Primary | ICD-10-CM

## 2021-04-23 DIAGNOSIS — Z00.6 PATIENT IN CLINICAL RESEARCH STUDY: ICD-10-CM

## 2021-04-23 DIAGNOSIS — M45.9 ANKYLOSING SPONDYLITIS, UNSPECIFIED SITE OF SPINE (H): ICD-10-CM

## 2021-04-23 PROCEDURE — 86481 TB AG RESPONSE T-CELL SUSP: CPT | Performed by: INTERNAL MEDICINE

## 2021-04-24 LAB
GAMMA INTERFERON BACKGROUND BLD IA-ACNC: 0 IU/ML
M TB IFN-G CD4+ BCKGRND COR BLD-ACNC: 10 IU/ML
M TB TUBERC IFN-G BLD QL: NEGATIVE
MITOGEN IGNF BCKGRD COR BLD-ACNC: 0 IU/ML
MITOGEN IGNF BCKGRD COR BLD-ACNC: 0.03 IU/ML

## 2021-04-26 ENCOUNTER — MYC MEDICAL ADVICE (OUTPATIENT)
Dept: GASTROENTEROLOGY | Facility: CLINIC | Age: 46
End: 2021-04-26

## 2021-04-26 ENCOUNTER — TELEPHONE (OUTPATIENT)
Dept: FAMILY MEDICINE | Facility: CLINIC | Age: 46
End: 2021-04-26

## 2021-04-26 LAB — RESEARCH KIT COLLECTION: NORMAL

## 2021-04-27 ENCOUNTER — TELEPHONE (OUTPATIENT)
Dept: GASTROENTEROLOGY | Facility: CLINIC | Age: 46
End: 2021-04-27

## 2021-04-27 NOTE — TELEPHONE ENCOUNTER
Patient called writer back to discuss Trulance denial.  Writer told patient that the patient was denied for income issues. The patient stated that they explained to him that he was given the initial dose but it wasn't approved for refilling as his medication wasn't covered.  Writer explained that we did have a denial letter on file if he needed it.  Writer also told patient that another provider had taken trulance off of his medication list. The patient told writer that maybe it was another nurse or staff person that misunderstood while going over his medication list and took it off. It wasn't Sofía Wiggins that removed the medication.   Writer told patient that all this information was being sent to Lorna Wiggins for advisement and that writer  would contact the patient when we hear back form Lorna regarding further treatment or alternative medications to try.  Patient understood and had no further questions  Forwarding to Sofía Wiggins for advisement.  Praneeth Colvin, CMA

## 2021-04-27 NOTE — TELEPHONE ENCOUNTER
"Per review of chart under media tab dated 4/26/2021, \"Prescriber notice of Medicare Part D transition fill\" - Trulance is a non-covered drug, preferred drugs are Linzess and Lactulose oral solution.      Forwarding to provider for review/recommendation.    Holly Presley RN      "

## 2021-04-27 NOTE — TELEPHONE ENCOUNTER
Writer called Patient to clarify medication PA and another provider taking it off of his medication list.  Writer called to discuss PA denial from [employer] and what paperwork  he needed in order to appeal.  LVM for patient to call back.  Praneeth Colvin CMA

## 2021-04-27 NOTE — TELEPHONE ENCOUNTER
linzess is listed as a preferred alternative. We could give this a try if he is willing.     Sofía Wiggins PA-C  Gastroenterology  [facility]

## 2021-04-28 ENCOUNTER — TRANSFERRED RECORDS (OUTPATIENT)
Dept: HEALTH INFORMATION MANAGEMENT | Facility: CLINIC | Age: 46
End: 2021-04-28

## 2021-04-28 ENCOUNTER — MYC MEDICAL ADVICE (OUTPATIENT)
Dept: PALLIATIVE MEDICINE | Facility: CLINIC | Age: 46
End: 2021-04-28

## 2021-04-28 ENCOUNTER — HOSPITAL ENCOUNTER (OUTPATIENT)
Dept: BEHAVIORAL HEALTH | Facility: CLINIC | Age: 46
End: 2021-04-28
Attending: PSYCHIATRY & NEUROLOGY
Payer: MEDICARE

## 2021-04-28 DIAGNOSIS — Z20.822 ENCOUNTER FOR LABORATORY TESTING FOR COVID-19 VIRUS: ICD-10-CM

## 2021-04-28 DIAGNOSIS — M47.819 FACET ARTHROPATHY: Primary | ICD-10-CM

## 2021-04-28 PROCEDURE — 90853 GROUP PSYCHOTHERAPY: CPT | Mod: PO | Performed by: SOCIAL WORKER

## 2021-04-28 NOTE — GROUP NOTE
Process Group Note    PATIENT'S NAME: Joel Pineda  MRN:   [id number]  :   1975  ACCT. NUMBER: [id number]  DATE OF SERVICE: 21  START TIME:  1:00 PM  END TIME:  1:50 PM  FACILITATOR: Magali Dodson LICSW  TOPIC:  Process Group    Diagnoses:  296.33 (F33.2) Major Depressive Disorder, Recurrent Episode, Severe _.  4. Other Diagnoses that is relevant to services:   300.00 (F41.9) Unspecified Anxiety Disorder  301.83 (F60.3) Borderline Personality Disorder.      [facility] Day [facility]  TRACK: [facility]    NUMBER OF PARTICIPANTS: 3    Telemedicine Visit: The patient's condition can be safely assessed and treated via synchronous audio and visual telemedicine encounter.      Reason for Telemedicine Visit: Services only offered telehealth    Originating Site (Patient Location): Patient's home    Distant Site (Provider Location): Provider Remote Setting    Consent:  The patient/guardian has verbally consented to: the potential risks and benefits of telemedicine (video visit) versus in person care; bill my insurance or make self-payment for services provided; and responsibility for payment of non-covered services.     Mode of Communication:  Video Conference via [employer]    As the provider I attest to compliance with applicable laws and regulations related to telemedicine.     Client participated in educational discussion on identifying personal strengths.      Data:    Session content: At the start of this group, patients were invited to check in by identifying themselves, describing their current emotional status, and identifying issues to address in this group.   Area(s) of treatment focus addressed in this session included Symptom Management, Personal Safety and Community Resources/Discharge Planning.  Tito reported efforts to apply for a spinal nerve treatment.  He also participated in a trial for parkinson's disorder.  He reported efforts to complete medical  appointments.   He also is working on settling in the [place].  Mood was less depressed and less anxious. Client denied suicidal ideation, intent and plan.     Therapeutic Interventions/Treatment Strategies:  Psychotherapist offered support, feedback and validation and reinforced use of skills. Treatment modalities used include Cognitive Behavioral Therapy. Interventions include Coping Skills: Assisted patient in identifying 1-2 healthy distraction skills to reduce overall distress.    Assessment:    Patient response:   Patient responded to session by giving feedback, listening and focusing on goals    Possible barriers to participation / learning include: and no barriers identified    Health Issues:   None reported       Substance Use Review:   Substance Use: No active concerns identified.    Mental Status/Behavioral Observations  Appearance:   Appropriate   Eye Contact:   Good   Psychomotor Behavior: Normal   Attitude:   Cooperative   Orientation:   All  Speech   Rate / Production: Normal    Volume:  Normal   Mood:    Anxious  Depressed   Affect:    Appropriate   Thought Content:   Clear  Thought Form:  Coherent  Logical     Insight:    Good     Plan:     Safety Plan: No current safety concerns identified.  Recommended that patient call 911 or go to the local ED should there be a change in any of these risk factors.     Barriers to treatment: None identified    Patient Contracts (see media tab):  None    Substance Use: Not addressed in session     Continue or Discharge: Patient will continue in Adult Day Treatment (ADT)  as planned. Patient is likely to benefit from learning and using skills as they work toward the goals identified in their treatment plan.      Magali Dodson, [facility]  April 28, 2021

## 2021-04-28 NOTE — TELEPHONE ENCOUNTER
My Chart message sent to patient requesting a reply if patient would like [facility] Rx to his [facility].    Holly Presley RN

## 2021-04-28 NOTE — PROGRESS NOTES
Acknowledgement of Current Treatment Plan       I have reviewed my treatment plan with my therapist / counselor on 4/28/21.   I agree with the plan as it is written in the electronic health record. ([facility])    Name:      Signature:  Joel Pineda Unable to sign due to COVID 19 pandemic     Dr Raza Leonard MD  Psychiatrist    Magali Dodson, [facility]  Psychotherapist FIORELLA Ybarra, [facility]

## 2021-04-29 ENCOUNTER — NURSE TRIAGE (OUTPATIENT)
Dept: NURSING | Facility: CLINIC | Age: 46
End: 2021-04-29

## 2021-04-29 ENCOUNTER — TELEPHONE (OUTPATIENT)
Dept: SURGERY | Facility: CLINIC | Age: 46
End: 2021-04-29

## 2021-04-29 ENCOUNTER — OFFICE VISIT (OUTPATIENT)
Dept: SURGERY | Facility: CLINIC | Age: 46
End: 2021-04-29
Payer: MEDICARE

## 2021-04-29 VITALS
BODY MASS INDEX: 36.45 KG/M2 | HEART RATE: 79 BPM | SYSTOLIC BLOOD PRESSURE: 122 MMHG | HEIGHT: 78 IN | OXYGEN SATURATION: 97 % | WEIGHT: 315 LBS | TEMPERATURE: 98.7 F | DIASTOLIC BLOOD PRESSURE: 79 MMHG

## 2021-04-29 DIAGNOSIS — L03.314 CELLULITIS OF LEFT GROIN: Primary | ICD-10-CM

## 2021-04-29 PROCEDURE — 99202 OFFICE O/P NEW SF 15 MIN: CPT | Performed by: NURSE PRACTITIONER

## 2021-04-29 RX ORDER — SULFAMETHOXAZOLE/TRIMETHOPRIM 800-160 MG
1 TABLET ORAL 2 TIMES DAILY
Qty: 14 TABLET | Refills: 0 | Status: SHIPPED | OUTPATIENT
Start: 2021-04-29 | End: 2021-05-06

## 2021-04-29 ASSESSMENT — MIFFLIN-ST. JEOR: SCORE: 2511.92

## 2021-04-29 ASSESSMENT — PAIN SCALES - GENERAL: PAINLEVEL: MILD PAIN (3)

## 2021-04-29 NOTE — TELEPHONE ENCOUNTER
Patient states he has a golf ball size area that is red and inflamed near his anus. He has had three other I &Ds for this in the past. This is not draining currently. I told him we can see him today but he will have to wait a bit to be seen. He said he would come down right away.

## 2021-04-29 NOTE — TELEPHONE ENCOUNTER
[facility] Call Center    Phone Message    May a detailed message be left on voicemail: yes     Reason for Call: Other: Per pt wanted to see Dr. Zhu but is no longer with [facility]. Per pt would like to be seen asap with his flare up of perineal abscess. Per pt states the perineal abscess is at the usual place that is the size of a golf ball on the left center. Per pt states if he doesn't get seen soon he may go to the ED. Please call pt back to see when or who he should see. Thank you.      Action Taken: Message routed to:  [facility]): Colon adn rec    Travel Screening: Not Applicable

## 2021-04-29 NOTE — TELEPHONE ENCOUNTER
Per Medicare medical policy-No PA required    o REPEAT RFA  - Must wait 6 months and experience significant pain relief following previous RFA. Prior Auth is NOT required.    MEDICA RFA REQUIREMENTS- NO PA REQUIRED  REPEAT RFA REQUIREMENTS FOR ALL  Must wait 6 months and experience >50% pain relief following previous RFA.          Okay to schedule      Lorraine CHAVARRIA    [facility]

## 2021-04-29 NOTE — TELEPHONE ENCOUNTER
Kal from patient    Stacia,     Good afternoon. I've got some questions about repeating multi-level, bilateral RF Facet Nerve ablation (L3-L4, L4-L5, L5-S1) with Dr. Diaz.     My understanding is that Medicare now covers this procedure (Medicare is my primary, and [employer] (not Medicaid) is secondary). I'm hoping for some relief, and this procedure was previously helpful.     Please advise.     Thanks much,  Tito.       10/26/16 Procedure performed: bilateral L3, 4 and 5 lumbar radiofrequency ablation. Order pended for review.

## 2021-04-29 NOTE — PROGRESS NOTES
"Colon and Rectal Surgery Follow-Up Clinic Note    RE: Joel Pineda  : 1975  MARY: 2021    Joel Pineda is a very pleasant 45 year old male with history of diverticular disease s/p sigmoidectomy with Dr. Justice in . He has most recently been seen for recurrent perineal abscess. He has had abscesses in past, most recently drained in local ED in . He was subsequently seen by Dr. Zhu. This healed and he saw Dr. Jimenez in October of last year with an anal fissure.     Interval history: Fissure healed and 24 hours ago he noticed a red, inflamed area by his buttock that is getting more painful.  No drainage.  No fevers or chills.    Physical Examination: Exam was chaperoned by Daisy Warner MA   /79 (BP Location: Left arm, Patient Position: Sitting, Cuff Size: Adult Large)   Pulse 79   Temp 98.7  F (37.1  C) (Oral)   Ht 6' 6\"   Wt (!) 331 lb 8 oz   SpO2 97%   BMI 38.31 kg/m    General: Alert, oriented, in no acute distress, sitting comfortably  HEENT: Mucous membranes moist  Perianal external examination:  Sebaceous cyst with some surrounding cellulitis in the left posterior groin/buttock    Assessment/Plan: 45 year old male with with what appears to be folliculitis or an infected sebaceous cyst.  This is more in his posterior groin or left buttock.  I asked Dr. Sanchez from general surgery to see the patient today.  He recommended warm soaks and compresses and to follow-up with his primary care provider next week but to return to see him if symptoms worsened or did not resolve.  We will start him on a week of antibiotics.    Medical history:  Past Medical History:   Diagnosis Date     Acne      Acquired hypothyroidism      Allergic state      Ankylosing spondylitis lumbar region (H)      Ankylosing spondylitis of sacral region (H)      Anxiety      Bipolar 2 disorder (H)      Chest pain     Chest pain, regulated w/BP meds. Clear arteries.     Chronic pain      DDD " (degenerative disc disease), lumbar      Depressive disorder      Diabetes (H)      Diverticulosis      Facet arthritis of cervical region      Gastroesophageal reflux disease      Hypertension      IBS (irritable bowel syndrome)      Intracranial arachnoid cyst      Polyneuropathy      Pulmonary embolism (H)      Skin exam, screening for cancer 12/3/2013     Sleep apnea      Uncomplicated asthma        Surgical history:  Past Surgical History:   Procedure Laterality Date     BACK SURGERY  10/07    lumbar discectomy L5-S1     COLONOSCOPY      Note: colonoscopy scheduled with [facility] on Friday, 9/4/15     COSMETIC SURGERY  2012    Nose Exterior - functional     GI SURGERY  August 2013    Sigmoidectomy     HERNIA REPAIR, UMBILICAL  8/23/11    small, Dr. Evan Beavers     INCISION AND DRAINAGE, ABSCESS, COMPLEX  8/23/11    umbilical, Dr. Evan Beavers     LAPAROSCOPIC ASSISTED COLECTOMY LEFT (DESCENDING)  8/15/2013    Procedure: LAPAROSCOPIC ASSISTED COLECTOMY LEFT (DESCENDING);  Laparoscopic Hand Assisted Sigmoid Resection, Mobilization of Splenic Fissure, coloproctoscopy, *Latex Free Room* Anesthesia General with Pain block  ;  Surgeon: Aurora Justice MD;  Location: [facility] OR     NERVE SURGERY  8/18/11    RF ablation @ L3-S1 @ [facility]     RECONSTRUCT NOSE AND SEPTUM (FUNCTIONAL)  10/14/2011    Procedure:RECONSTRUCT NOSE AND SEPTUM (FUNCTIONAL); Functional Septorhinoplasty, Turbinate Reduction, ; Surgeon:CEDRIC CUEVAS; Location:[facility] OR     SINUS SURGERY  10/1/01    ethmoidectomy chronic sinusitis       Problem list:  Patient Active Problem List    Diagnosis Date Noted     Major depressive disorder, recurrent episode, severe (H) 12/02/2020     Priority: Medium     Dysautonomia (H) 08/18/2020     Priority: Medium     PHN (postherpetic neuralgia) 07/15/2020     Priority: Medium     POTS (postural orthostatic tachycardia syndrome) 03/24/2020     Priority: Medium     Orthostatic dizziness 03/18/2020     Priority: Medium      Gastroparesis 01/24/2020     Priority: Medium     Hypertriglyceridemia 12/16/2019     Priority: Medium     Ingrown toenail 07/03/2019     Priority: Medium     History of pulmonary embolism 01/28/2019     Priority: Medium     Peripheral polyneuropathy 01/23/2019     Priority: Medium     Type 2 diabetes mellitus with complication, without long-term current use of insulin (H) 12/24/2018     Priority: Medium     DDD (degenerative disc disease), lumbar 11/13/2018     Priority: Medium     Morbid obesity due to hypertriglyceridemia (H) 05/17/2018     Priority: Medium     Fatty infiltration of liver 05/17/2018     Priority: Medium     Ankylosing spondylitis of sacral region (H) 02/28/2018     Priority: Medium     Chronic pain syndrome 04/04/2017     Priority: Medium     Patient is followed by Ksenia Lyles MD for ongoing prescription of pain medication.  All refills should only be approved by this provider, or covering partner.    Medication(s): oxy 5.   Maximum quantity per month: 40  Clinic visit frequency required: Q3  months     Controlled substance agreement: 6/23/2020  UDS: Aug 2019    Encounter-Level CSA - 04/04/2017:          Controlled Substance Agreement - Scan on 4/11/2017  1:30 PM : CONTROLLED SUBSTANCE AGREEMENT (below)              Pain Clinic evaluation in the past: Yes    DIRE Total Score(s):  No flowsheet data found.    Last [facility] website verification:  12/2/2020   [email or web address]        Essential hypertension with goal blood pressure less than 140/90 11/01/2016     Priority: Medium     B12 deficiency 08/29/2016     Priority: Medium     Irritable bowel syndrome with diarrhea 08/19/2016     Priority: Medium     Chronic midline low back pain without sciatica 06/06/2016     Priority: Medium     Bipolar 2 disorder (H) 12/28/2015     Priority: Medium     Acquired hypothyroidism 10/08/2015     Priority: Medium     Facet arthritis of cervical region 10/06/2015     Priority: Medium      Intracranial arachnoid cyst 10/02/2015     Priority: Medium     LLOYD (obstructive sleep apnea)- mild (AHI 11) 01/31/2015     Priority: Medium     Study Date: 1/27/2015- (308.1 lbs)  Snoring was reported assoft to moderate.  Lowest oxygen saturation was 88.0%. Apnea/Hypopnea Index was 11.5 events per hour. REM was not seen.  The supine AHI is 12.7.  RDI was  25.7. PLM index was 0 per hour.  Sleep study 10/31/16 [facility] (292#) CPAP 8 cm effective       Anxiety 01/12/2015     Priority: Medium     GERD (gastroesophageal reflux disease)      Priority: Medium     Chronic nonallergic rhinitis      Priority: Medium     RAST all NEGATIVE for environmental allergens, IgE= 6 (normal)       Hyperlipidemia LDL goal <100      Priority: Medium     Major depressive disorder, recurrent episode (H)      Priority: Medium     Multiple psych providers - they manage meds  August 2015: Provigil induced severe mood dis-function       Intermittent asthma      Priority: Medium     Exercise-induced       Diverticulosis 09/01/2006     Priority: Medium     Recurrent diverticulitis, S/p sigmoid resction 8/2013         Medications:  Current Outpatient Medications   Medication Sig Dispense Refill     acetaminophen 500 MG CAPS Take 1,000 mg by mouth 2 times daily  60 capsule      albuterol (PROAIR HFA/PROVENTIL HFA/VENTOLIN HFA) 108 (90 Base) MCG/ACT inhaler Inhale 2 puffs into the lungs every 4 hours as needed for shortness of breath / dyspnea or wheezing 8.5 g 11     aspirin (ASA) 81 MG tablet Take 81 mg by mouth daily        baclofen (LIORESAL) 10 MG tablet Take 0.5-1 tablets (5-10 mg) by mouth 2 times daily 60 tablet 1     cetirizine (ZYRTEC) 10 MG tablet Take 1 tablet (10 mg) by mouth At Bedtime 30 tablet 11     cholecalciferol (VITAMIN D3) 99687 units (1250 mcg) capsule Take one capsule every two weeks. 24 capsule 1     clonazePAM (KLONOPIN) 0.5 MG tablet Take 0.5 mg by mouth At Bedtime        cyanocobalamin (CYANOCOBALAMIN) 1000 MCG/ML  injection Inject 1 mL (1,000 mcg) into the muscle every 30 days 10 mL 0     DULoxetine (CYMBALTA) 60 MG capsule Take 60 mg by mouth 2 times daily        EPINEPHrine (ANY BX GENERIC EQUIV) 0.3 MG/0.3ML injection 2-pack Inject 0.3 mLs (0.3 mg) into the muscle once as needed for anaphylaxis 0.6 mL 3     etanercept (ENBREL SURECLICK) 50 MG/ML autoinjector Inject 50 mg Subcutaneous once a week . Hold for signs of infection, and seek medical attention. 4 mL 12     famotidine (PEPCID) 20 MG tablet Prior to administration of Humira every 2 weeks (Patient taking differently: Take 20 mg by mouth every 7 days Prior to Enbrel Injections to prevent skin reaction)       fluticasone (FLONASE) 50 MCG/ACT nasal spray Spray 1 spray into both nostrils daily       fluticasone-salmeterol (ADVAIR) 500-50 MCG/DOSE inhaler Inhale 1 puff into the lungs every 12 hours 3 each 3     glipiZIDE (GLUCOTROL XL) 5 MG 24 hr tablet Take 1 tablet (5 mg) by mouth daily 90 tablet 1     lamoTRIgine (LAMICTAL) 100 MG tablet Take 200 mg by mouth daily       levothyroxine (SYNTHROID/LEVOTHROID) 75 MCG tablet TAKE 1 TABLET EVERY MORNING 90 tablet 3     lidocaine (XYLOCAINE) 5 % external ointment Apply topically 4 times daily as needed (pain) 50 g 2     melatonin 3 MG tablet Take 9 mg by mouth nightly as needed        metoclopramide (REGLAN) 5 MG tablet Take 5 mg by mouth 2 times daily       metoprolol succinate ER (TOPROL-XL) 200 MG 24 hr tablet Take 1 tablet (200 mg) by mouth 2 times daily 180 tablet 1     montelukast (SINGULAIR) 10 MG tablet Take 1 tablet (10 mg) by mouth every evening 90 tablet 1     nabumetone (RELAFEN) 500 MG tablet TAKE 1-2 TABLETS BY MOUTH TWICE DAILY WITH MEALS AS NEEDED FOR BACK/JOINT PAIN 60 tablet 2     naloxone (NARCAN) 4 MG/0.1ML nasal spray Spray 1 spray (4 mg) into one nostril alternating nostrils as needed for opioid reversal every 2-3 minutes until assistance arrives 0.2 mL 0     olopatadine (PATADAY) 0.2 % ophthalmic  solution Place 1 drop into both eyes daily 2.5 mL 3     omega-3 acid ethyl esters (LOVAZA) 1 g capsule TAKE 2 CAPSULES BY MOUTH TWICE DAILY  360 capsule 1     omeprazole 20 MG tablet Take 20 mg by mouth daily        order for DME Equipment being ordered: Assure Compression stockings (ANNETTA) Large, closed toe, thigh-high 30-40 compression 2 Units 3     order for DME Equipment being ordered: lumbosacral belt/brace 1 Units 0     order for DME Respironics REMSTAR 60 Series Auto CPAP 9-13 cm H2O, Wisp nasal mask w/a large cushion and a chinstrap       oxyCODONE (ROXICODONE) 5 MG tablet Take 1-2 tablets (5-10 mg) by mouth every 6 hours as needed for pain (maximum 4 tablet(s) per day) 35 tablet 0     polyethylene glycol (MIRALAX) 17 g packet Take 1 packet by mouth daily       pregabalin (LYRICA) 150 MG capsule Take 1 capsule (150 mg) by mouth 3 times daily 90 capsule 5     QUEtiapine (SEROQUEL) 100 MG tablet Take 100 mg by mouth At Bedtime       ramipril (ALTACE) 10 MG capsule Take 1 capsule (10 mg) by mouth daily 90 capsule 1     rizatriptan (MAXALT-MLT) 5 MG ODT DISSOLVE 1 TABLET BY MOUTH AT ONSET OF HEADACHE 30 tablet 0     rosuvastatin (CRESTOR) 40 MG tablet Take 1 tablet (40 mg) by mouth daily 90 tablet 2     sulfamethoxazole-trimethoprim (BACTRIM DS) 800-160 MG tablet Take 1 tablet by mouth 2 times daily for 7 days 14 tablet 0     syringe, disposable, (BD TUBERCULIN SYRINGE) 1 ML MISC Equipment being ordered: 1 ml tuberculin syringes to be used for Vitamin B12 injections. 12 each 11     vitamin B complex with vitamin C (STRESS TAB) tablet Take 1 tablet by mouth daily       ZINC SULFATE-VITAMIN C MT Take 1 tablet by mouth daily       albuterol (PROVENTIL) (2.5 MG/3ML) 0.083% neb solution Take 1 vial (2.5 mg) by nebulization every 6 hours as needed for shortness of breath / dyspnea or wheezing (Patient not taking: Reported on 4/19/2021) 200 mL 3       Allergies:  Allergies   Allergen Reactions     Amoxicillin-Pot  "Clavulanate Difficulty breathing     Banana Shortness Of Breath     Pt reports organic Banana is okay.      Nitroglycerin Palpitations     Penicillins Anaphylaxis     Provigil [Modafinil] Shortness Of Breath     headache     Gadolinium Hives and Itching     Patient was premedicated for the contrast allergy. He did still have a reaction a few hours after injection. Hives and itching. Dr. Gomez told tech to inform pt he should only have contrast again in the future when premedicated and at a hospital. Not at an outpatient facility.      Ketoconazole      Topical cream caused swelling and itching     Dye [Contrast Dye] Other (See Comments) and Hives     Moderate flushing, CT contrast     Golimumab      Hives, bradycardia, face swelling     Neurontin [Gabapentin] Hives     Moderate hives     Nortriptyline Hives     Varicella Virus Vaccine Live      Rash     Flagyl [Metronidazole Hcl] Palpitations and Hives     Latex Rash     Metronidazole Palpitations, Other (See Comments) and Rash     dizziness (versus ciprofloxacin taken at same time)       Family history:  Family History   Problem Relation Age of Onset     Musculoskeletal Disorder Mother         back     Anxiety Disorder Mother      Colon Polyps Mother      Ulcerative Colitis Mother         and ischemic small intestine, surgery     Hypertension Mother      Breast Cancer Mother      Osteoporosis Mother      Diabetes Mother         Type 2, Diagnosed in 2014     Depression Mother         Takes Cymbalta to help with chronic pain + depx     Thyroid Disease Mother         Hypothyroidism     Obesity Mother         Under much better control latter half of 2015     Musculoskeletal Disorder Father         back     Substance Abuse Father      Hypertension Father      Hyperlipidemia Father      Depression Father         Off meds for many years. Seems \"ok\"     Heart Disease Maternal Grandmother      Heart Disease Maternal Grandfather      Psychotic Disorder Paternal " "Grandfather      Suicide Paternal Grandfather      Depression Paternal Grandfather         Pediatrician. Committed suicide by pistol in 1990.     Musculoskeletal Disorder Brother         back     Depression Brother         Expressed as anger and moodiness     Substance Abuse Brother      Substance Abuse Sister      Depression Sister         Mental Health Therapist, yet no anti-depressants?     Anxiety Disorder Sister         Mental Health Therapist, yet no anti-anxiety meds?     Other Cancer Other         Bladder Cancer - Fatal     Substance Abuse Brother      Colon Cancer No family hx of      Crohn's Disease No family hx of      Anesthesia Reaction No family hx of      Cancer No family hx of         No family history of skin cancer       Social history:  Social History     Tobacco Use     Smoking status: Never Smoker     Smokeless tobacco: Never Used   Substance Use Topics     Alcohol use: Not Currently     Alcohol/week: 0.0 standard drinks     Drinks per session: 1 or 2     Binge frequency: Weekly     Comment: occ 1/week     Marital status: single.    Nursing Notes:   Daisy Booth CMA  4/29/2021  1:17 PM  Signed  Chief Complaint   Patient presents with     New Patient     New consult for abscess       Vitals:    04/29/21 1259   BP: 122/79   BP Location: Left arm   Patient Position: Sitting   Cuff Size: Adult Large   Pulse: 79   Temp: 98.7  F (37.1  C)   TempSrc: Oral   SpO2: 97%   Weight: (!) 331 lb 8 oz   Height: 6' 6\"       Body mass index is 38.31 kg/m .       Daisy Calabrese CMA         20 minutes spent on the date of the encounter doing chart review, history and exam, documentation and further activities as noted above.     Anitha Obrien, NP-C  Colon and Rectal Surgery  [facility]    "

## 2021-04-29 NOTE — LETTER
"2021       RE: Joel Pineda  [id number] [facility] [address]     Dear Colleague,    Thank you for referring your patient, Joel Pineda, to the [facility] [place] at [facility]. Please see a copy of my visit note below.    Colon and Rectal Surgery Follow-Up Clinic Note    RE: Joel Pineda  : 1975  MARY: 2021    Joel Pineda is a very pleasant 45 year old male with history of diverticular disease s/p sigmoidectomy with Dr. Justice in . He has most recently been seen for recurrent perineal abscess. He has had abscesses in past, most recently drained in local ED in . He was subsequently seen by Dr. Zhu. This healed and he saw Dr. Jimenez in October of last year with an anal fissure.     Interval history: Fissure healed and 24 hours ago he noticed a red, inflamed area by his buttock that is getting more painful.  No drainage.  No fevers or chills.    Physical Examination: Exam was chaperoned by Daisy Warner MA   /79 (BP Location: Left arm, Patient Position: Sitting, Cuff Size: Adult Large)   Pulse 79   Temp 98.7  F (37.1  C) (Oral)   Ht 6' 6\"   Wt (!) 331 lb 8 oz   SpO2 97%   BMI 38.31 kg/m    General: Alert, oriented, in no acute distress, sitting comfortably  HEENT: Mucous membranes moist  Perianal external examination:  Sebaceous cyst with some surrounding cellulitis in the left posterior groin/buttock    Assessment/Plan: 45 year old male with with what appears to be folliculitis or an infected sebaceous cyst.  This is more in his posterior groin or left buttock.  I asked Dr. Sanchez from general surgery to see the patient today.  He recommended warm soaks and compresses and to follow-up with his primary care provider next week but to return to see him if symptoms worsened or did not resolve.  We will start him on a week of antibiotics.    Medical " history:  Past Medical History:   Diagnosis Date     Acne      Acquired hypothyroidism      Allergic state      Ankylosing spondylitis lumbar region (H)      Ankylosing spondylitis of sacral region (H)      Anxiety      Bipolar 2 disorder (H)      Chest pain     Chest pain, regulated w/BP meds. Clear arteries.     Chronic pain      DDD (degenerative disc disease), lumbar      Depressive disorder      Diabetes (H)      Diverticulosis      Facet arthritis of cervical region      Gastroesophageal reflux disease      Hypertension      IBS (irritable bowel syndrome)      Intracranial arachnoid cyst      Polyneuropathy      Pulmonary embolism (H)      Skin exam, screening for cancer 12/3/2013     Sleep apnea      Uncomplicated asthma        Surgical history:  Past Surgical History:   Procedure Laterality Date     BACK SURGERY  10/07    lumbar discectomy L5-S1     COLONOSCOPY      Note: colonoscopy scheduled with [facility] on Friday, 9/4/15     COSMETIC SURGERY  2012    Nose Exterior - functional     GI SURGERY  August 2013    Sigmoidectomy     HERNIA REPAIR, UMBILICAL  8/23/11    Dr. Evan whiting     INCISION AND DRAINAGE, ABSCESS, COMPLEX  8/23/11    umbilical, Dr. Evan Beavers     LAPAROSCOPIC ASSISTED COLECTOMY LEFT (DESCENDING)  8/15/2013    Procedure: LAPAROSCOPIC ASSISTED COLECTOMY LEFT (DESCENDING);  Laparoscopic Hand Assisted Sigmoid Resection, Mobilization of Splenic Fissure, coloproctoscopy, *Latex Free Room* Anesthesia General with Pain block  ;  Surgeon: Aurora Justice MD;  Location: [facility] OR     NERVE SURGERY  8/18/11    RF ablation @ L3-S1 @ [facility]     RECONSTRUCT NOSE AND SEPTUM (FUNCTIONAL)  10/14/2011    Procedure:RECONSTRUCT NOSE AND SEPTUM (FUNCTIONAL); Functional Septorhinoplasty, Turbinate Reduction, ; Surgeon:CEDRIC CUEVAS; Location:[facility] OR     SINUS SURGERY  10/1/01    ethmoidectomy chronic sinusitis       Problem list:  Patient Active Problem List    Diagnosis Date Noted     Major depressive  disorder, recurrent episode, severe (H) 12/02/2020     Priority: Medium     Dysautonomia (H) 08/18/2020     Priority: Medium     PHN (postherpetic neuralgia) 07/15/2020     Priority: Medium     POTS (postural orthostatic tachycardia syndrome) 03/24/2020     Priority: Medium     Orthostatic dizziness 03/18/2020     Priority: Medium     Gastroparesis 01/24/2020     Priority: Medium     Hypertriglyceridemia 12/16/2019     Priority: Medium     Ingrown toenail 07/03/2019     Priority: Medium     History of pulmonary embolism 01/28/2019     Priority: Medium     Peripheral polyneuropathy 01/23/2019     Priority: Medium     Type 2 diabetes mellitus with complication, without long-term current use of insulin (H) 12/24/2018     Priority: Medium     DDD (degenerative disc disease), lumbar 11/13/2018     Priority: Medium     Morbid obesity due to hypertriglyceridemia (H) 05/17/2018     Priority: Medium     Fatty infiltration of liver 05/17/2018     Priority: Medium     Ankylosing spondylitis of sacral region (H) 02/28/2018     Priority: Medium     Chronic pain syndrome 04/04/2017     Priority: Medium     Patient is followed by Ksenia Lyles MD for ongoing prescription of pain medication.  All refills should only be approved by this provider, or covering partner.    Medication(s): oxy 5.   Maximum quantity per month: 40  Clinic visit frequency required: Q3  months     Controlled substance agreement: 6/23/2020  UDS: Aug 2019    Encounter-Level CSA - 04/04/2017:          Controlled Substance Agreement - Scan on 4/11/2017  1:30 PM : CONTROLLED SUBSTANCE AGREEMENT (below)              Pain Clinic evaluation in the past: Yes    DIRE Total Score(s):  No flowsheet data found.    Last [facility] website verification:  12/2/2020   [email or web address]        Essential hypertension with goal blood pressure less than 140/90 11/01/2016     Priority: Medium     B12 deficiency 08/29/2016     Priority: Medium     Irritable bowel  syndrome with diarrhea 08/19/2016     Priority: Medium     Chronic midline low back pain without sciatica 06/06/2016     Priority: Medium     Bipolar 2 disorder (H) 12/28/2015     Priority: Medium     Acquired hypothyroidism 10/08/2015     Priority: Medium     Facet arthritis of cervical region 10/06/2015     Priority: Medium     Intracranial arachnoid cyst 10/02/2015     Priority: Medium     LLOYD (obstructive sleep apnea)- mild (AHI 11) 01/31/2015     Priority: Medium     Study Date: 1/27/2015- (308.1 lbs)  Snoring was reported assoft to moderate.  Lowest oxygen saturation was 88.0%. Apnea/Hypopnea Index was 11.5 events per hour. REM was not seen.  The supine AHI is 12.7.  RDI was  25.7. PLM index was 0 per hour.  Sleep study 10/31/16 [facility] (292#) CPAP 8 cm effective       Anxiety 01/12/2015     Priority: Medium     GERD (gastroesophageal reflux disease)      Priority: Medium     Chronic nonallergic rhinitis      Priority: Medium     RAST all NEGATIVE for environmental allergens, IgE= 6 (normal)       Hyperlipidemia LDL goal <100      Priority: Medium     Major depressive disorder, recurrent episode (H)      Priority: Medium     Multiple psych providers - they manage meds  August 2015: Provigil induced severe mood dis-function       Intermittent asthma      Priority: Medium     Exercise-induced       Diverticulosis 09/01/2006     Priority: Medium     Recurrent diverticulitis, S/p sigmoid resction 8/2013         Medications:  Current Outpatient Medications   Medication Sig Dispense Refill     acetaminophen 500 MG CAPS Take 1,000 mg by mouth 2 times daily  60 capsule      albuterol (PROAIR HFA/PROVENTIL HFA/VENTOLIN HFA) 108 (90 Base) MCG/ACT inhaler Inhale 2 puffs into the lungs every 4 hours as needed for shortness of breath / dyspnea or wheezing 8.5 g 11     aspirin (ASA) 81 MG tablet Take 81 mg by mouth daily        baclofen (LIORESAL) 10 MG tablet Take 0.5-1 tablets (5-10 mg) by mouth 2 times daily 60 tablet  1     cetirizine (ZYRTEC) 10 MG tablet Take 1 tablet (10 mg) by mouth At Bedtime 30 tablet 11     cholecalciferol (VITAMIN D3) 57003 units (1250 mcg) capsule Take one capsule every two weeks. 24 capsule 1     clonazePAM (KLONOPIN) 0.5 MG tablet Take 0.5 mg by mouth At Bedtime        cyanocobalamin (CYANOCOBALAMIN) 1000 MCG/ML injection Inject 1 mL (1,000 mcg) into the muscle every 30 days 10 mL 0     DULoxetine (CYMBALTA) 60 MG capsule Take 60 mg by mouth 2 times daily        EPINEPHrine (ANY BX GENERIC EQUIV) 0.3 MG/0.3ML injection 2-pack Inject 0.3 mLs (0.3 mg) into the muscle once as needed for anaphylaxis 0.6 mL 3     etanercept (ENBREL SURECLICK) 50 MG/ML autoinjector Inject 50 mg Subcutaneous once a week . Hold for signs of infection, and seek medical attention. 4 mL 12     famotidine (PEPCID) 20 MG tablet Prior to administration of Humira every 2 weeks (Patient taking differently: Take 20 mg by mouth every 7 days Prior to Enbrel Injections to prevent skin reaction)       fluticasone (FLONASE) 50 MCG/ACT nasal spray Spray 1 spray into both nostrils daily       fluticasone-salmeterol (ADVAIR) 500-50 MCG/DOSE inhaler Inhale 1 puff into the lungs every 12 hours 3 each 3     glipiZIDE (GLUCOTROL XL) 5 MG 24 hr tablet Take 1 tablet (5 mg) by mouth daily 90 tablet 1     lamoTRIgine (LAMICTAL) 100 MG tablet Take 200 mg by mouth daily       levothyroxine (SYNTHROID/LEVOTHROID) 75 MCG tablet TAKE 1 TABLET EVERY MORNING 90 tablet 3     lidocaine (XYLOCAINE) 5 % external ointment Apply topically 4 times daily as needed (pain) 50 g 2     melatonin 3 MG tablet Take 9 mg by mouth nightly as needed        metoclopramide (REGLAN) 5 MG tablet Take 5 mg by mouth 2 times daily       metoprolol succinate ER (TOPROL-XL) 200 MG 24 hr tablet Take 1 tablet (200 mg) by mouth 2 times daily 180 tablet 1     montelukast (SINGULAIR) 10 MG tablet Take 1 tablet (10 mg) by mouth every evening 90 tablet 1     nabumetone (RELAFEN) 500 MG  tablet TAKE 1-2 TABLETS BY MOUTH TWICE DAILY WITH MEALS AS NEEDED FOR BACK/JOINT PAIN 60 tablet 2     naloxone (NARCAN) 4 MG/0.1ML nasal spray Spray 1 spray (4 mg) into one nostril alternating nostrils as needed for opioid reversal every 2-3 minutes until assistance arrives 0.2 mL 0     olopatadine (PATADAY) 0.2 % ophthalmic solution Place 1 drop into both eyes daily 2.5 mL 3     omega-3 acid ethyl esters (LOVAZA) 1 g capsule TAKE 2 CAPSULES BY MOUTH TWICE DAILY  360 capsule 1     omeprazole 20 MG tablet Take 20 mg by mouth daily        order for DME Equipment being ordered: Assure Compression stockings (ANNETTA) Large, closed toe, thigh-high 30-40 compression 2 Units 3     order for DME Equipment being ordered: lumbosacral belt/brace 1 Units 0     order for DME Respironics REMSTAR 60 Series Auto CPAP 9-13 cm H2O, Wisp nasal mask w/a large cushion and a chinstrap       oxyCODONE (ROXICODONE) 5 MG tablet Take 1-2 tablets (5-10 mg) by mouth every 6 hours as needed for pain (maximum 4 tablet(s) per day) 35 tablet 0     polyethylene glycol (MIRALAX) 17 g packet Take 1 packet by mouth daily       pregabalin (LYRICA) 150 MG capsule Take 1 capsule (150 mg) by mouth 3 times daily 90 capsule 5     QUEtiapine (SEROQUEL) 100 MG tablet Take 100 mg by mouth At Bedtime       ramipril (ALTACE) 10 MG capsule Take 1 capsule (10 mg) by mouth daily 90 capsule 1     rizatriptan (MAXALT-MLT) 5 MG ODT DISSOLVE 1 TABLET BY MOUTH AT ONSET OF HEADACHE 30 tablet 0     rosuvastatin (CRESTOR) 40 MG tablet Take 1 tablet (40 mg) by mouth daily 90 tablet 2     sulfamethoxazole-trimethoprim (BACTRIM DS) 800-160 MG tablet Take 1 tablet by mouth 2 times daily for 7 days 14 tablet 0     syringe, disposable, (BD TUBERCULIN SYRINGE) 1 ML MISC Equipment being ordered: 1 ml tuberculin syringes to be used for Vitamin B12 injections. 12 each 11     vitamin B complex with vitamin C (STRESS TAB) tablet Take 1 tablet by mouth daily       ZINC SULFATE-VITAMIN C MT  Take 1 tablet by mouth daily       albuterol (PROVENTIL) (2.5 MG/3ML) 0.083% neb solution Take 1 vial (2.5 mg) by nebulization every 6 hours as needed for shortness of breath / dyspnea or wheezing (Patient not taking: Reported on 4/19/2021) 200 mL 3       Allergies:  Allergies   Allergen Reactions     Amoxicillin-Pot Clavulanate Difficulty breathing     Banana Shortness Of Breath     Pt reports organic Banana is okay.      Nitroglycerin Palpitations     Penicillins Anaphylaxis     Provigil [Modafinil] Shortness Of Breath     headache     Gadolinium Hives and Itching     Patient was premedicated for the contrast allergy. He did still have a reaction a few hours after injection. Hives and itching. Dr. oGmez told tech to inform pt he should only have contrast again in the future when premedicated and at a hospital. Not at an outpatient facility.      Ketoconazole      Topical cream caused swelling and itching     Dye [Contrast Dye] Other (See Comments) and Hives     Moderate flushing, CT contrast     Golimumab      Hives, bradycardia, face swelling     Neurontin [Gabapentin] Hives     Moderate hives     Nortriptyline Hives     Varicella Virus Vaccine Live      Rash     Flagyl [Metronidazole Hcl] Palpitations and Hives     Latex Rash     Metronidazole Palpitations, Other (See Comments) and Rash     dizziness (versus ciprofloxacin taken at same time)       Family history:  Family History   Problem Relation Age of Onset     Musculoskeletal Disorder Mother         back     Anxiety Disorder Mother      Colon Polyps Mother      Ulcerative Colitis Mother         and ischemic small intestine, surgery     Hypertension Mother      Breast Cancer Mother      Osteoporosis Mother      Diabetes Mother         Type 2, Diagnosed in 2014     Depression Mother         Takes Cymbalta to help with chronic pain + depx     Thyroid Disease Mother         Hypothyroidism     Obesity Mother         Under much better control latter half of  "2015     Musculoskeletal Disorder Father         back     Substance Abuse Father      Hypertension Father      Hyperlipidemia Father      Depression Father         Off meds for many years. Seems \"ok\"     Heart Disease Maternal Grandmother      Heart Disease Maternal Grandfather      Psychotic Disorder Paternal Grandfather      Suicide Paternal Grandfather      Depression Paternal Grandfather         Pediatrician. Committed suicide by pistol in 1990.     Musculoskeletal Disorder Brother         back     Depression Brother         Expressed as anger and moodiness     Substance Abuse Brother      Substance Abuse Sister      Depression Sister         Mental Health Therapist, yet no anti-depressants?     Anxiety Disorder Sister         Mental Health Therapist, yet no anti-anxiety meds?     Other Cancer Other         Bladder Cancer - Fatal     Substance Abuse Brother      Colon Cancer No family hx of      Crohn's Disease No family hx of      Anesthesia Reaction No family hx of      Cancer No family hx of         No family history of skin cancer       Social history:  Social History     Tobacco Use     Smoking status: Never Smoker     Smokeless tobacco: Never Used   Substance Use Topics     Alcohol use: Not Currently     Alcohol/week: 0.0 standard drinks     Drinks per session: 1 or 2     Binge frequency: Weekly     Comment: occ 1/week     Marital status: single.    Nursing Notes:   Daisy Booth CMA  4/29/2021  1:17 PM  Signed  Chief Complaint   Patient presents with     New Patient     New consult for abscess       Vitals:    04/29/21 1259   BP: 122/79   BP Location: Left arm   Patient Position: Sitting   Cuff Size: Adult Large   Pulse: 79   Temp: 98.7  F (37.1  C)   TempSrc: Oral   SpO2: 97%   Weight: (!) 331 lb 8 oz   Height: 6' 6\"       Body mass index is 38.31 kg/m .       Daisy Calabrese CMA       20 minutes spent on the date of the encounter doing chart review, history and exam, documentation " and further activities as noted above.     Anitha Obrien NP-C  Colon and Rectal Surgery  [facility]

## 2021-04-29 NOTE — TELEPHONE ENCOUNTER
Pt requesting a repeat radiofrequency ablation with Dr Diaz.     Need to check insurance coverage for repeat bilateral L3, 4 and 5 lumbar radiofrequency ablation. radiofrequency ablation. Dx Facet arthropathy    Last radiofrequency ablation was 10/26/16.     Pt had 80 % relief for 8 months from last RFA.      Called pt and informed him/her that insurance would be checked and will be called once we get a response.    Routed to Dr. Diaz to review and sign order. Then route to Lorraine to check insurance.

## 2021-04-29 NOTE — NURSING NOTE
"Chief Complaint   Patient presents with     New Patient     New consult for abscess       Vitals:    04/29/21 1259   BP: 122/79   BP Location: Left arm   Patient Position: Sitting   Cuff Size: Adult Large   Pulse: 79   Temp: 98.7  F (37.1  C)   TempSrc: Oral   SpO2: 97%   Weight: (!) 331 lb 8 oz   Height: 6' 6\"       Body mass index is 38.31 kg/m .       Daisy Calabrese CMA    "

## 2021-04-30 NOTE — TELEPHONE ENCOUNTER
"Joel was seen in clinic today for a perineal abcess/cellulitis    He was started on Bactrim-DS. One dose taken this afternoon.  He states that he was also advised hot soak with Epsom salts    After soaking, pain is improved but the size of the abscess has increased - he estimates by ~1/3   He reports size to be approximately: 2\" long x 1\" wide x 1\" deep    Per visit note:  . . . I asked Dr. Sanchez from general surgery to see the patient today.  He recommended warm soaks and compresses and to follow-up with his primary care provider next week but to return to see him if symptoms worsened or did not resolve.  We will start him on a week of antibiotics.    Advised to continue with plan of care.  If symptoms worsen - fever or increased pain - call back    COVID 19 Nurse Triage Plan/Patient Instructions    Please be aware that novel coronavirus (COVID-19) may be circulating in the community. If you develop symptoms such as fever, cough, or SOB or if you have concerns about the presence of another infection including coronavirus (COVID-19), please contact your health care provider or visit [email or web address].     Disposition/Instructions    Home care recommended. Follow home care protocol based instructions.    Thank you for taking steps to prevent the spread of this virus.  o Limit your contact with others.  o Wear a simple mask to cover your cough.  o Wash your hands well and often.    Resources    [facility]: About COVID-19: [email or web address]    CDC: What to Do If You're Sick: www.cdc.gov/coronavirus/2019-ncov/about/steps-when-sick.html    CDC: Ending Home Isolation: www.cdc.gov/coronavirus/2019-ncov/hcp/disposition-in-home-patients.html     CDC: Caring for Someone: www.cdc.gov/coronavirus/2019-ncov/if-you-are-sick/care-for-someone.html     [facility]: Interim Guidance for Hospital Discharge to Home: www.health.[facility].mn.us/diseases/coronavirus/hcp/hospdischarge.pdf    [facility]" clinical trials (COVID-19 research studies): [email or web address]/[email or web address]-clinical-trials     Below are the COVID-19 hotlines at the [place] Department of Health ([facility]). Interpreters are available.   o For health questions: Call 959-435-1183 or 1-919.584.9914 (7 a.m. to 7 p.m.)  o For questions about schools and childcare: Call 034-063-1073 or 1-464.367.9945 (7 a.m. to 7 p.m.)     Dorita Schmitt RN  [facility]      Additional Information    [1] Follow-up call to recent contact AND [2] information only call, no triage required    Protocols used: INFORMATION ONLY CALL-A-AH

## 2021-04-30 NOTE — TELEPHONE ENCOUNTER
Screening Questions for RFA Procedure      Procedure ordered? [place]    What insurance are we billing for this procedure?  Medicare/[employer]  IF SCHEDULING IN [place], IT IS OKAY TO SCHEDULE. [place] HANDLES THEIR OWN PA'S AFTER THE PATIENT IS SCHEDULED. PLEASE SCHEDULE AT LEAST 1 WEEK OUT SO A PA CAN BE OBTAINED.    Has patient had this injection before? Yes:   This procedure requires that a COVID-19 lab test be done within 4 days of the procedure.   If patient asks why? We will not always be able to maintain a 6' distance, the procedure takes a long time, there will be more people in a smaller area, and use of sedation medication increases the risk of respiratory treatments.    Would you still like to move forward with scheduling the procedure?  Yes  If YES, let patient know that someone will call them to schedule the COVID-19 test and that they will only receive a call back if the result is positive. Route to nursing to enter order.   Any chance of pregnancy? NO   If YES, do NOT schedule and route to RN pool     Is  Needed?: No  Will patient have a ?  Yes   If pt is given sedation meds, no driving for 24 hours.  Is pt taking a cab or transportation service? NO        If so will need to be accompanied by an adult too (friend/family member) in order for IV sedation to be given.      Per [facility] Policy:  Outpatients are to have responsible adult or family member to accompany them at discharge and drive them home. A service providing medically trained drivers or attendants would be acceptable. Public transportation would not be acceptable unless the patient is accompanied by a responsible adult or family member.  Is patient taking any blood thinners (i.e. plavix, coumadin, jantoven, warfarin, heparin, pradaxa or dabigatran, etc)? No   If YES, do NOT schedule, and route to RN pool    Is patient taking any aspirin products? Yes - Pt takes 81mg daily; instructed to hold 0 day(s) prior to procedure.       If more than 325mg/day, OK to schedule; Instruct pt to decrease to less than 325 mg for 7 days AND route to RN pool    For CERVICAL procedures, hold all aspirin products for 6 days.     Tell pt that if aspirin product is not held for 6 days, the procedure WILL BE cancelled.      Does the patient have a bleeding or clotting disorder? No     If YES, it it OKAY to schedule AND route to RN pool    **For any patients with platelet count <100, must be forwarded to provider**    Is patient diabetic? Yes If YES, have them bring their glucometer.    Does patient have an active infection or treated for one within the past week? Yes - Skin Celulitis   If YES, do NOT schedule and route to RN nurse pool     Is patient currently taking any antibiotics?  Yes - Bactrim    For patients on chronic, preventative, or prophylactic antibiotics, procedures may be scheduled.     For patients on antibiotics for active or recent infection:antibiotic course must have been completed for 4 days    Is patient currently taking any steroid medications? (i.e. Prednisone, Medrol)  No     For patients on steroid medications, course must have been completed for 4 days    Is patient actively being treated for cancer or immunocompromised, including the spleen having been removed? No  If YES, do NOT schedule and route to RN pool     Any history of complications with sedation medications?  NO   If YES, OK to schedule AND route to RN pool     Any history of sleep apnea?  YES   If YES, OK to schedule AND route to RN pool     Any cardiac history?  NO   If YES, OK to schedule AND route to RN pool     Do you have an implanted pacemaker, ICD (implanted cardiac device) or AICD (automatic implanted cardiac device)?  NO    If YES, do NOT schedule AND route to RN pool.     Obtain name of device :       Obtain name of cardiologist:       Do you have an implanted stimulator?  NO    If YES, OK to schedule AND route to nursing.     Instruct patient to  bring in the remote to the appointment and it will need to be turned off.  reviewed      Does patient have an allergy to contrast dye, iodine or shellfish?  Yes: Contrast Dye   If YES, OK to schedule. Route to RN pool AND add allergy information to appointment notes    Are you able to get on and off an exam table with minimal or no assistance? Yes   If NO, do NOT schedule and route to RN pool    Are you able to roll over and lay on your stomach with minimal or no assistance? Yes   If NO, do NOT schedule and route to RN pool    Reminders:    If you are started on any steroids or antibiotics between now and your appointment, you must contact us because it may affect our ability to perform your procedure.  Yes    Informed patient that s/he needs to fast for 6 hours before procedure?  YES    Informed patient that it is OK to take normal medications with sips of water, especially blood pressure medications, before the procedure and must hold blood thinners as instructed.  Yes    Informed patient to arrive 30 minutes before procedure time to have an IV inserted.  reviewed   Do NOT schedule at 0745, 0815 or 1245.  reviewed     All radiofrequency ablations are in a 90 minute time slot.  reviewed

## 2021-05-02 ENCOUNTER — MYC REFILL (OUTPATIENT)
Dept: FAMILY MEDICINE | Facility: CLINIC | Age: 46
End: 2021-05-02

## 2021-05-02 DIAGNOSIS — M45.8 ANKYLOSING SPONDYLITIS OF SACRAL REGION (H): ICD-10-CM

## 2021-05-02 DIAGNOSIS — M51.369 DDD (DEGENERATIVE DISC DISEASE), LUMBAR: ICD-10-CM

## 2021-05-03 RX ORDER — OXYCODONE HYDROCHLORIDE 5 MG/1
5-10 TABLET ORAL EVERY 6 HOURS PRN
Qty: 35 TABLET | Refills: 0 | Status: SHIPPED | OUTPATIENT
Start: 2021-05-03 | End: 2021-05-18

## 2021-05-04 ENCOUNTER — TRANSFERRED RECORDS (OUTPATIENT)
Dept: HEALTH INFORMATION MANAGEMENT | Facility: CLINIC | Age: 46
End: 2021-05-04

## 2021-05-05 ENCOUNTER — HOSPITAL ENCOUNTER (OUTPATIENT)
Dept: BEHAVIORAL HEALTH | Facility: CLINIC | Age: 46
End: 2021-05-05
Attending: PSYCHIATRY & NEUROLOGY
Payer: MEDICARE

## 2021-05-05 PROCEDURE — 90853 GROUP PSYCHOTHERAPY: CPT | Mod: PO | Performed by: SOCIAL WORKER

## 2021-05-05 NOTE — GROUP NOTE
Process Group Note    PATIENT'S NAME: Joel Pineda  MRN:   [id number]  :   1975  ACCT. NUMBER: [id number]  DATE OF SERVICE: 21  START TIME:  1:00 PM  END TIME:  1:50 PM  FACILITATOR: Magali Dodson LICSW  TOPIC:  Process Group    Diagnoses:  296.33 (F33.2) Major Depressive Disorder, Recurrent Episode, Severe _.  4. Other Diagnoses that is relevant to services:   300.00 (F41.9) Unspecified Anxiety Disorder  301.83 (F60.3) Borderline Personality Disorder.      [facility] Day Treatment  TRACK: [facility]    NUMBER OF PARTICIPANTS: 5    Telemedicine Visit: The patient's condition can be safely assessed and treated via synchronous audio and visual telemedicine encounter.      Reason for Telemedicine Visit: Services only offered telehealth    Originating Site (Patient Location): Patient's home    Distant Site (Provider Location): Provider Remote Setting    Consent:  The patient/guardian has verbally consented to: the potential risks and benefits of telemedicine (video visit) versus in person care; bill my insurance or make self-payment for services provided; and responsibility for payment of non-covered services.     Mode of Communication:  Video Conference via Got[employer] Personal Care Device    As the provider I attest to compliance with applicable laws and regulations related to telemedicine.     Client participated in educational topic on the PLEASE skills.      Data:    Session content: At the start of this group, patients were invited to check in by identifying themselves, describing their current emotional status, and identifying issues to address in this group.   Area(s) of treatment focus addressed in this session included Symptom Management, Personal Safety and Community Resources/Discharge Planning.  Tito reported sleeping better now that he was tiring the kittens out with play prior to his bedtime.  He stated that he is using his as needed medication as well.   He stated that  he is using rewards to motivate himself to clean the home.  He stated that his friends are setting him up to go on a blind date.  Mood was less depressed.  Client denied suicidal ideation, intent and plan.     Therapeutic Interventions/Treatment Strategies:  Psychotherapist offered support, feedback and validation and reinforced use of skills. Treatment modalities used include Cognitive Behavioral Therapy. Interventions include Cognitive Restructuring:  Assisted patient in formulating new neutral/positive alternatives to challenge less helpful / ineffective thoughts.    Assessment:    Patient response:   Patient responded to session by listening, focusing on goals and being attentive    Possible barriers to participation / learning include: and no barriers identified    Health Issues:   None reported       Substance Use Review:   Substance Use: No active concerns identified.    Mental Status/Behavioral Observations  Appearance:   Appropriate   Eye Contact:   Good   Psychomotor Behavior: Normal   Attitude:   Cooperative   Orientation:   All  Speech   Rate / Production: Normal    Volume:  Normal   Mood:    Anxious  Depressed   Affect:    Appropriate   Thought Content:   Clear  Thought Form:  Coherent  Logical     Insight:    Good     Plan:     Safety Plan: No current safety concerns identified.  Recommended that patient call 911 or go to the local ED should there be a change in any of these risk factors.     Barriers to treatment: None identified    Patient Contracts (see media tab):  None    Substance Use: Not addressed in session     Continue or Discharge: Patient will continue in Adult Day Treatment (ADT)  as planned. Patient is likely to benefit from learning and using skills as they work toward the goals identified in their treatment plan.      Magali Dodson, [facility]  May 5, 2021

## 2021-05-05 NOTE — TELEPHONE ENCOUNTER
Okay to schedule 4 days after last dose of antibiotic (on/after 5/11/21) .  If unhealed pt is to call and cancel.    Called pt. And left message relaying the above.  It  Was mentioned that the next available slot for an radiofrequency ablation is 5/26/21.    Routed to schedulers.    Eloisa RN-BSN  [facility]

## 2021-05-05 NOTE — TELEPHONE ENCOUNTER
No reply to previous message sent, follow up message sent inquiring if patient would like to try Linzess.      Holly Presley RN

## 2021-05-05 NOTE — TELEPHONE ENCOUNTER
Pt scheduled,      Routing to nursing to place COVID test       Macie Strauss    [facility]

## 2021-05-05 NOTE — TELEPHONE ENCOUNTER
MD Gilda Whitley RN             Ok   She has to skip it the day before and the day of the procedure     Call to patient and informed her of MD's message.  Patient verbalized understanding.   See [employer] message dated 5/5/2021 - patient declined Linzess prescription.     Holly Presley RN

## 2021-05-06 ENCOUNTER — OFFICE VISIT (OUTPATIENT)
Dept: FAMILY MEDICINE | Facility: CLINIC | Age: 46
End: 2021-05-06
Payer: MEDICARE

## 2021-05-06 VITALS
RESPIRATION RATE: 20 BRPM | HEART RATE: 69 BPM | SYSTOLIC BLOOD PRESSURE: 129 MMHG | DIASTOLIC BLOOD PRESSURE: 84 MMHG | TEMPERATURE: 97.1 F | BODY MASS INDEX: 36.45 KG/M2 | WEIGHT: 315 LBS | OXYGEN SATURATION: 97 % | HEIGHT: 78 IN

## 2021-05-06 DIAGNOSIS — L02.214 ABSCESS OF GROIN, LEFT: Primary | ICD-10-CM

## 2021-05-06 PROCEDURE — 99213 OFFICE O/P EST LOW 20 MIN: CPT | Performed by: INTERNAL MEDICINE

## 2021-05-06 RX ORDER — DOXYCYCLINE 100 MG/1
100 CAPSULE ORAL 2 TIMES DAILY
Qty: 20 CAPSULE | Refills: 2 | Status: SHIPPED | OUTPATIENT
Start: 2021-05-06 | End: 2021-05-16

## 2021-05-06 RX ORDER — SULFAMETHOXAZOLE/TRIMETHOPRIM 800-160 MG
1 TABLET ORAL 2 TIMES DAILY
Qty: 20 TABLET | Refills: 2 | Status: SHIPPED | OUTPATIENT
Start: 2021-05-06 | End: 2021-05-16

## 2021-05-06 ASSESSMENT — PAIN SCALES - GENERAL: PAINLEVEL: MODERATE PAIN (5)

## 2021-05-06 ASSESSMENT — MIFFLIN-ST. JEOR: SCORE: 2473.37

## 2021-05-06 NOTE — PROGRESS NOTES
"[facility] Internal Medicine Progress Note           Assessment and Plan:   1. Abscess of groin, left  Recommend dual treatment with Bactrim DS plus Doxycycline. Otherwise, he may require another I & D.  - sulfamethoxazole-trimethoprim (BACTRIM DS) 800-160 MG tablet; Take 1 tablet by mouth 2 times daily for 10 days  Dispense: 20 tablet; Refill: 2  - doxycycline monohydrate (MONODOX) 100 MG capsule; Take 1 capsule (100 mg) by mouth 2 times daily for 10 days  Dispense: 20 capsule; Refill: 2           Interval History:     Tito is a 45 year old male,  who presents today with the following concerns:    Reason for visit: abscess  Onset: < 10 days  Description: \"Bump in left groin.  Course: Worse  Intensity: Moderate  Associated symptoms: Pain without fever nor draingae.  History: Yes. Recurrent perineal abscesses, Hx of incision and drainage.  Evaluation: Yes. Seen by Colorectal surgery team last month.  Precipitating factor: Hx of diverticular disease, S/P sigmoidectomy. Immunosuppressed due to biologic agents for ankylosing spondylitis.  Alleviating factor: None  Complications: None for current episode.  Therapies tried and outcome: Bactrim DS (not effective).               Significant Problems:   Patient Active Problem List   Diagnosis     Major depressive disorder, recurrent episode (H)     Intermittent asthma     Hyperlipidemia LDL goal <100     Chronic nonallergic rhinitis     Diverticulosis     GERD (gastroesophageal reflux disease)     Anxiety     LLOYD (obstructive sleep apnea)- mild (AHI 11)     Intracranial arachnoid cyst     Facet arthritis of cervical region     Acquired hypothyroidism     Bipolar 2 disorder (H)     Chronic midline low back pain without sciatica     Irritable bowel syndrome with diarrhea     B12 deficiency     Essential hypertension with goal blood pressure less than 140/90     Chronic pain syndrome     Ankylosing spondylitis of sacral region (H)     Morbid obesity due to " hypertriglyceridemia (H)     Fatty infiltration of liver     DDD (degenerative disc disease), lumbar     Type 2 diabetes mellitus with complication, without long-term current use of insulin (H)     Peripheral polyneuropathy     History of pulmonary embolism     Ingrown toenail     Hypertriglyceridemia     Gastroparesis     Orthostatic dizziness     POTS (postural orthostatic tachycardia syndrome)     PHN (postherpetic neuralgia)     Dysautonomia (H)     Major depressive disorder, recurrent episode, severe (H)              Review of Systems:   CONSTITUTIONAL: NEGATIVE for fever, chills, change in weight  INTEGUMENTARY/SKIN: As above.  EYES: NEGATIVE for vision changes or irritation  ENT/MOUTH: NEGATIVE for ear, mouth and throat problems  RESP: NEGATIVE for significant cough or SOB  CV: NEGATIVE for chest pain, palpitations or peripheral edema  GI: NEGATIVE for nausea, abdominal pain, heartburn, or change in bowel habits  : NEGATIVE for frequency, dysuria, or hematuria  MUSCULOSKELETAL:POSITIVE  for ankylosing spondylitis.  NEURO: NEGATIVE for weakness, dizziness or paresthesias  ENDOCRINE: NEGATIVE for temperature intolerance, skin/hair changes  HEME: NEGATIVE for bleeding problems  PSYCHIATRIC: NEGATIVE for changes in mood or affect            Medications:     Current Outpatient Medications   Medication Sig     acetaminophen 500 MG CAPS Take 1,000 mg by mouth 2 times daily      albuterol (PROAIR HFA/PROVENTIL HFA/VENTOLIN HFA) 108 (90 Base) MCG/ACT inhaler Inhale 2 puffs into the lungs every 4 hours as needed for shortness of breath / dyspnea or wheezing     albuterol (PROVENTIL) (2.5 MG/3ML) 0.083% neb solution Take 1 vial (2.5 mg) by nebulization every 6 hours as needed for shortness of breath / dyspnea or wheezing (Patient not taking: Reported on 4/19/2021)     aspirin (ASA) 81 MG tablet Take 81 mg by mouth daily      baclofen (LIORESAL) 10 MG tablet Take 0.5-1 tablets (5-10 mg) by mouth 2 times daily      cetirizine (ZYRTEC) 10 MG tablet Take 1 tablet (10 mg) by mouth At Bedtime     cholecalciferol (VITAMIN D3) 53549 units (1250 mcg) capsule Take one capsule every two weeks.     clonazePAM (KLONOPIN) 0.5 MG tablet Take 0.5 mg by mouth At Bedtime      cyanocobalamin (CYANOCOBALAMIN) 1000 MCG/ML injection Inject 1 mL (1,000 mcg) into the muscle every 30 days     DULoxetine (CYMBALTA) 60 MG capsule Take 60 mg by mouth 2 times daily      EPINEPHrine (ANY BX GENERIC EQUIV) 0.3 MG/0.3ML injection 2-pack Inject 0.3 mLs (0.3 mg) into the muscle once as needed for anaphylaxis     etanercept (ENBREL SURECLICK) 50 MG/ML autoinjector Inject 50 mg Subcutaneous once a week . Hold for signs of infection, and seek medical attention.     famotidine (PEPCID) 20 MG tablet Prior to administration of Humira every 2 weeks (Patient taking differently: Take 20 mg by mouth every 7 days Prior to Enbrel Injections to prevent skin reaction)     fluticasone (FLONASE) 50 MCG/ACT nasal spray Spray 1 spray into both nostrils daily     fluticasone-salmeterol (ADVAIR) 500-50 MCG/DOSE inhaler Inhale 1 puff into the lungs every 12 hours     glipiZIDE (GLUCOTROL XL) 5 MG 24 hr tablet Take 1 tablet (5 mg) by mouth daily     lamoTRIgine (LAMICTAL) 100 MG tablet Take 200 mg by mouth daily     levothyroxine (SYNTHROID/LEVOTHROID) 75 MCG tablet TAKE 1 TABLET EVERY MORNING     lidocaine (XYLOCAINE) 5 % external ointment Apply topically 4 times daily as needed (pain)     melatonin 3 MG tablet Take 9 mg by mouth nightly as needed      metoclopramide (REGLAN) 5 MG tablet Take 5 mg by mouth 2 times daily     metoprolol succinate ER (TOPROL-XL) 200 MG 24 hr tablet Take 1 tablet (200 mg) by mouth 2 times daily     montelukast (SINGULAIR) 10 MG tablet Take 1 tablet (10 mg) by mouth every evening     nabumetone (RELAFEN) 500 MG tablet TAKE 1-2 TABLETS BY MOUTH TWICE DAILY WITH MEALS AS NEEDED FOR BACK/JOINT PAIN     naloxone (NARCAN) 4 MG/0.1ML nasal spray Spray 1  "spray (4 mg) into one nostril alternating nostrils as needed for opioid reversal every 2-3 minutes until assistance arrives     olopatadine (PATADAY) 0.2 % ophthalmic solution Place 1 drop into both eyes daily     omega-3 acid ethyl esters (LOVAZA) 1 g capsule TAKE 2 CAPSULES BY MOUTH TWICE DAILY      omeprazole 20 MG tablet Take 20 mg by mouth daily      order for DME Equipment being ordered: Assure Compression stockings (ANNETTA) Large, closed toe, thigh-high 30-40 compression     order for DME Equipment being ordered: lumbosacral belt/brace     order for DME Respironics REMSTAR 60 Series Auto CPAP 9-13 cm H2O, Wisp nasal mask w/a large cushion and a chinstrap     oxyCODONE (ROXICODONE) 5 MG tablet Take 1-2 tablets (5-10 mg) by mouth every 6 hours as needed for pain (maximum 4 tablet(s) per day)     polyethylene glycol (MIRALAX) 17 g packet Take 1 packet by mouth daily     pregabalin (LYRICA) 150 MG capsule Take 1 capsule (150 mg) by mouth 3 times daily     QUEtiapine (SEROQUEL) 100 MG tablet Take 100 mg by mouth At Bedtime     ramipril (ALTACE) 10 MG capsule Take 1 capsule (10 mg) by mouth daily     rizatriptan (MAXALT-MLT) 5 MG ODT DISSOLVE 1 TABLET BY MOUTH AT ONSET OF HEADACHE     rosuvastatin (CRESTOR) 40 MG tablet Take 1 tablet (40 mg) by mouth daily     sulfamethoxazole-trimethoprim (BACTRIM DS) 800-160 MG tablet Take 1 tablet by mouth 2 times daily for 7 days     syringe, disposable, (BD TUBERCULIN SYRINGE) 1 ML MISC Equipment being ordered: 1 ml tuberculin syringes to be used for Vitamin B12 injections.     vitamin B complex with vitamin C (STRESS TAB) tablet Take 1 tablet by mouth daily     ZINC SULFATE-VITAMIN C MT Take 1 tablet by mouth daily     No current facility-administered medications for this visit.              Physical Exam:   /84 (BP Location: Right arm, Patient Position: Sitting, Cuff Size: Adult Large)   Pulse 69   Temp 97.1  F (36.2  C) (Tympanic)   Resp 20   Ht 1.981 m (6' 6\")   Wt " 146.5 kg (323 lb)   SpO2 97%   BMI 37.33 kg/m    Constitutional: Awake, alert, cooperative, no apparent distress, and appears stated age.  Eyes: extra-ocular muscles intact and sclera clear  ENT: normocepalic, without obvious abnormality  Lungs: no increased work of breathing and no retractions  Neurologic: Mental Status Exam:  Level of Alertness:   awake  Orientation:   person, place, time  Memory:   normal  Fund of Knowledge:  normal  Attention/Concentration:  normal  Language:  normal  Motor Exam:  moves all extremities well and symmetrically  Neuropsychiatric: Normal affect, mood, orientation, memory and insight.  Neuropsychiatric: Affect: normal  Orientation: oriented to self, place, time and situation  Skin: Fluctuant, ovoid, non-tender, mildly-erythematous lesion located in the left groin, in close proximity to the perineum.          Data:   Epic reviewed.     Disposition:  Follow-up in 7 - 10 days with PCP.    Houston Koroma MD  Internal Medicine  [facility] Team

## 2021-05-07 ENCOUNTER — VIRTUAL VISIT (OUTPATIENT)
Dept: PSYCHOLOGY | Facility: CLINIC | Age: 46
End: 2021-05-07
Payer: MEDICARE

## 2021-05-07 DIAGNOSIS — F41.1 GENERALIZED ANXIETY DISORDER: ICD-10-CM

## 2021-05-07 DIAGNOSIS — F33.1 MDD (MAJOR DEPRESSIVE DISORDER), RECURRENT EPISODE, MODERATE (H): Primary | ICD-10-CM

## 2021-05-07 DIAGNOSIS — E55.9 VITAMIN D DEFICIENCY: ICD-10-CM

## 2021-05-07 PROCEDURE — 90791 PSYCH DIAGNOSTIC EVALUATION: CPT | Mod: 95 | Performed by: SOCIAL WORKER

## 2021-05-07 RX ORDER — CHOLECALCIFEROL (VITAMIN D3) 1250 MCG
CAPSULE ORAL
Qty: 24 CAPSULE | Refills: 0 | Status: SHIPPED | OUTPATIENT
Start: 2021-05-07 | End: 2022-05-17

## 2021-05-07 ASSESSMENT — COLUMBIA-SUICIDE SEVERITY RATING SCALE - C-SSRS
4. HAVE YOU HAD THESE THOUGHTS AND HAD SOME INTENTION OF ACTING ON THEM?: NO
5. HAVE YOU STARTED TO WORK OUT OR WORKED OUT THE DETAILS OF HOW TO KILL YOURSELF? DO YOU INTEND TO CARRY OUT THIS PLAN?: NO
2. HAVE YOU ACTUALLY HAD ANY THOUGHTS OF KILLING YOURSELF LIFETIME?: NO
3. HAVE YOU BEEN THINKING ABOUT HOW YOU MIGHT KILL YOURSELF?: N
6. HAVE YOU EVER DONE ANYTHING, STARTED TO DO ANYTHING, OR PREPARED TO DO ANYTHING TO END YOUR LIFE?: NO
1. IN THE PAST MONTH, HAVE YOU WISHED YOU WERE DEAD OR WISHED YOU COULD GO TO SLEEP AND NOT WAKE UP?: YES
1. IN THE PAST MONTH, HAVE YOU WISHED YOU WERE DEAD OR WISHED YOU COULD GO TO SLEEP AND NOT WAKE UP?: YES
REASONS FOR IDEATION PAST MONTH: COMPLETELY TO END OR STOP THE PAIN (YOU COULDN'T GO ON LIVING WITH THE PAIN OR HOW YOU WERE FEELING)
3. HAVE YOU BEEN THINKING ABOUT HOW YOU MIGHT KILL YOURSELF?: NO
ATTEMPT LIFETIME: NO
ATTEMPT PAST THREE MONTHS: NO
6. HAVE YOU EVER DONE ANYTHING, STARTED TO DO ANYTHING, OR PREPARED TO DO ANYTHING TO END YOUR LIFE?: NO
5. HAVE YOU STARTED TO WORK OUT OR WORKED OUT THE DETAILS OF HOW TO KILL YOURSELF? DO YOU INTEND TO CARRY OUT THIS PLAN?: N
4. HAVE YOU HAD THESE THOUGHTS AND HAD SOME INTENTION OF ACTING ON THEM?: NO
TOTAL  NUMBER OF INTERRUPTED ATTEMPTS LIFETIME: NO
TOTAL  NUMBER OF ABORTED OR SELF INTERRUPTED ATTEMPTS PAST LIFETIME: NO
5. HAVE YOU STARTED TO WORK OUT OR WORKED OUT THE DETAILS OF HOW TO KILL YOURSELF? DO YOU INTEND TO CARRY OUT THIS PLAN?: NO
TOTAL  NUMBER OF INTERRUPTED ATTEMPTS PAST 3 MONTHS: NO
2. HAVE YOU ACTUALLY HAD ANY THOUGHTS OF KILLING YOURSELF?: NO
TOTAL  NUMBER OF ABORTED OR SELF INTERRUPTED ATTEMPTS PAST 3 MONTHS: NO

## 2021-05-07 ASSESSMENT — ANXIETY QUESTIONNAIRES
1. FEELING NERVOUS, ANXIOUS, OR ON EDGE: MORE THAN HALF THE DAYS
3. WORRYING TOO MUCH ABOUT DIFFERENT THINGS: MORE THAN HALF THE DAYS
GAD7 TOTAL SCORE: 13
7. FEELING AFRAID AS IF SOMETHING AWFUL MIGHT HAPPEN: MORE THAN HALF THE DAYS
4. TROUBLE RELAXING: MORE THAN HALF THE DAYS
5. BEING SO RESTLESS THAT IT IS HARD TO SIT STILL: SEVERAL DAYS
6. BECOMING EASILY ANNOYED OR IRRITABLE: MORE THAN HALF THE DAYS
2. NOT BEING ABLE TO STOP OR CONTROL WORRYING: MORE THAN HALF THE DAYS

## 2021-05-07 ASSESSMENT — PATIENT HEALTH QUESTIONNAIRE - PHQ9: SUM OF ALL RESPONSES TO PHQ QUESTIONS 1-9: 15

## 2021-05-07 NOTE — PROGRESS NOTES
"[facility] Counseling   Provider Name:  Jenny Samuelalesia     [facility]    PATIENT'S NAME: Joel Pineda  PREFERRED NAME: Daria  PRONOUNS:     he/him  MRN: [id number]  : 1975  ADDRESS: [facility] [place] [address]   ACCT. NUMBER:  [id number]  DATE OF SERVICE: 21  START TIME: 1258  END TIME: 1357  PREFERRED PHONE: [phone]  May we leave a program related message: Yes  SERVICE MODALITY:  Video Visit:      Provider verified identity through the following two step process.  Patient provided:  Patient     Telemedicine Visit: The patient's condition can be safely assessed and treated via synchronous audio and visual telemedicine encounter.      Reason for Telemedicine Visit: Services only offered telehealth    Originating Site (Patient Location): Patient's home    Distant Site (Provider Location): Provider Remote Setting    Consent:  The patient/guardian has verbally consented to: the potential risks and benefits of telemedicine (video visit) versus in person care; bill my insurance or make self-payment for services provided; and responsibility for payment of non-covered services.     Patient would like the video invitation sent by:  Send to e-mail at: [email or web address]    Mode of Communication:  Video Conference via Amwell    As the provider I attest to compliance with applicable laws and regulations related to telemedicine.    UNIVERSAL ADULT Mental Health DIAGNOSTIC ASSESSMENT      Identifying Information:  Patient is a 45 year old, .  The pronoun use throughout this assessment reflects the patient's chosen pronoun.  Patient was referred for an assessment by community mental health program [facility] IOP.  Patient attended the session alone.     Chief Complaint:   The reason for seeking services at this time is: \" needing an individual provider for mental health \"   The problem(s) began 200. Patient has attempted to resolve these concerns in the " past through DBT, IOP, outpatient therapy, medication management.    Social/Family History:  Patient reported they grew up in [place], [place].  They were raised by biological parents.  Parents  when patient was 2 years of age.  Patient reported that their childhood was challenging due to social skills.  Patient described their current relationships with family of origin as difficult with his mother due to his step-father doesn't treat his mother well. Patient's father lives in [place] with his third wife and he see's him on occasion.       The patient describes their cultural background as a 45 year old  male.  Cultural influences and impact on patient's life structure, values, norms, and healthcare: Spiritual Beliefs: lost of Restorationism and Cultural Bias none reported.  Contextual influences on patient's health include: Family Factors difficult childhood.    These factors will be addressed in the Preliminary Treatment plan.  Patient identified their preferred language to be English. Patient reported they does not need the assistance of an  or other support involved in therapy.     Patient reported experienced significant delays in developmental tasks, such as hearing, which caused problems until dx.   Patient's highest education level was college graduate. Patient identified the following learning problems: hearing.  Modifications will not be used to assist communication in therapy.  Patient reports they are  able to understand written materials.    Patient reported the following relationship history.  Patient's current relationship status is single for 47 years.   Patient identified their sexual orientation as heterosexual.  Patient reported having zero child(reagan). Patient identified mother, siblings, pets and friends as part of their support system.  Patient identified the quality of these relationships as good.      Patient's current living/housing situation involves staying in own  home/apartment.  They live with cats and they report that housing is stable.     Patient is currently disabled.  Patient reports their finances are obtained through disability.  Patient does identify finances as a current stressor.      Patient reported that they have not been involved with the legal system.  Patient denies being on probation / parole / under the jurisdiction of the court.    Patient's Strengths and Limitations:  Patient identified the following strengths or resources that will help them succeed in treatment: friends / good social support, family support and sense of humor. Things that may interfere with the patient's success in treatment include: none identified.     Personal and Family Medical History:   Patient does report a family history of mental health concerns.  Patient reports family history includes Anxiety Disorder in his mother and sister; Breast Cancer in his mother; Colon Polyps in his mother; Depression in his brother, father, mother, paternal grandfather, and sister; Diabetes in his mother; Heart Disease in his maternal grandfather and maternal grandmother; Hyperlipidemia in his father; Hypertension in his father and mother; Musculoskeletal Disorder in his brother, father, and mother; Obesity in his mother; Osteoporosis in his mother; Other Cancer in an other family member; Psychotic Disorder in his paternal grandfather; Substance Abuse in his brother, brother, father, and sister; Suicide in his paternal grandfather; Thyroid Disease in his mother; Ulcerative Colitis in his mother..     Patient does report Mental Health Diagnosis and/or Treatment.  Patient Patient reported the following previous diagnoses which include(s): an Anxiety Disorder, a Bipolar Disorder, Depression and a Personality Disorder .  Patient reported symptoms began in elementary school.   Patient has received mental health services in the past: psychiatry, IOP day program, DBT, TMS and ECT.  Psychiatric  Hospitalizations: 5-10 hospitalizations since 2008 due to SI due to chronic pain.  Patient denies a history of civil commitment.  Currently, patient is receiving other mental health services.  These include psychiatry, day programming.           Patient has had a physical exam to rule out medical causes for current symptoms.  Date of last physical exam was within the past year. Client was encouraged to follow up with PCP if symptoms were to develop. The patient has a [facility] Care Provider, who is named Ksenia Lyles.  Patient reports the following current medical concerns: see chart several and no current dental concerns.  Patient reports pain concerns including chronic pain.  Patient does want help addressing pain concerns.  There are significant appetite / nutritional concerns / weight changes.  Patient does report a history of head injury / trauma / cognitive impairment.  Patient reports trauma from ECT    Patient reports current meds as: see chart      Medication Adherence:  Patient reports taking prescribed medications as prescribed.    Patient Allergies:    Allergies   Allergen Reactions     Amoxicillin-Pot Clavulanate Difficulty breathing     Banana Shortness Of Breath     Pt reports organic Banana is okay.      Nitroglycerin Palpitations     Penicillins Anaphylaxis     Provigil [Modafinil] Shortness Of Breath     headache     Gadolinium Hives and Itching     Patient was premedicated for the contrast allergy. He did still have a reaction a few hours after injection. Hives and itching. Dr. Gomez told tech to inform pt he should only have contrast again in the future when premedicated and at a hospital. Not at an outpatient facility.      Ketoconazole      Topical cream caused swelling and itching     Dye [Contrast Dye] Other (See Comments) and Hives     Moderate flushing, CT contrast     Golimumab      Hives, bradycardia, face swelling     Neurontin [Gabapentin] Hives     Moderate  hives     Nortriptyline Hives     Varicella Virus Vaccine Live      Rash     Flagyl [Metronidazole Hcl] Palpitations and Hives     Latex Rash     Metronidazole Palpitations, Other (See Comments) and Rash     dizziness (versus ciprofloxacin taken at same time)       Medical History:    Past Medical History:   Diagnosis Date     Acne      Acquired hypothyroidism      Allergic state      Ankylosing spondylitis lumbar region (H)      Ankylosing spondylitis of sacral region (H)      Anxiety      Bipolar 2 disorder (H)      Chest pain     Chest pain, regulated w/BP meds. Clear arteries.     Chronic pain      DDD (degenerative disc disease), lumbar      Depressive disorder      Diabetes (H)      Diverticulosis      Facet arthritis of cervical region      Gastroesophageal reflux disease      Hypertension      IBS (irritable bowel syndrome)      Intracranial arachnoid cyst      Polyneuropathy      Pulmonary embolism (H)      Skin exam, screening for cancer 12/3/2013     Sleep apnea      Uncomplicated asthma          Current Mental Status Exam:   Appearance:  Appropriate    Eye Contact:  Fair   Psychomotor:  Restless       Gait / station:  no problem  Attitude / Demeanor: Interested Playful Pleasant  Speech      Rate / Production: Emotional Talkative      Volume:  Normal  volume      Language:  intact  Mood:   Anxious  Depressed  Sad  Expansive  Affect:   Expansive  Worrisome    Thought Content: Clear   Thought Process: Coherent       Associations: No loosening of associations  Insight:   Fair   Judgment:  Intact   Orientation:  All  Attention/concentration: Short    Rating Scales:    PHQ9:    PHQ-9 SCORE 3/5/2021 5/4/2021 5/7/2021   PHQ-9 Total Score - - -   PHQ-9 Total Score MyChart 12 (Moderate depression) 11 (Moderate depression) -   PHQ-9 Total Score 12 - 15   PHQ-9 Total Score - - -   Some encounter information is confidential and restricted. Go to Review Flowsheets activity to see all data.   ;    GAD7:    YUDI-7 SCORE  3/5/2021 5/4/2021 5/7/2021   Total Score - - -   Total Score 8 (mild anxiety) 11 (moderate anxiety) -   Total Score 8 - 13   Total Score BEH Adult - - -   Some encounter information is confidential and restricted. Go to Review Flowsheets activity to see all data.     CGI:     First:Considering your total clinical experience with this particular patient population, how severe are the patient's symptoms at this time?: 6 (5/7/2021  1:00 PM)  ;    Most recentCompared to the patient's condition at the START of treatment, this patient's condition is: 6 (5/7/2021  1:00 PM)      Substance Use:  Patient did report a family history of substance use concerns; see medical history section for details.  Patient has not received chemical dependency treatment in the past.  Patient has not ever been to detox.      Patient is not currently receiving any chemical dependency treatment. Patient reported the following problems as a result of their substance use: none.    Patient denies using alcohol.  Patient denies using tobacco.  Patient denies using marijuana.  Patient reports using caffeine 5 times per day and drinks 1 at a time. Patient started using caffeine at age 16.  Patient reports using/abusing the following substance(s). Patient reported no other substance use.     CAGE- AID:    CAGE-AID Total Score 5/7/2021   Total Score 0   Some encounter information is confidential and restricted. Go to Review Flowsheets activity to see all data.       Substance Use: blackouts in 20's    Based on the negative CAGE score and clinical interview there  are not indications of drug or alcohol abuse.      Significant Losses / Trauma / Abuse / Neglect Issues:   Patient did not serve in the .  There are indications or report of significant loss, trauma, abuse or neglect issues related to: death of grandfather and cats and divorce / relational changes parents divorce.  Concerns for possible neglect are not present.     Safety Assessment:    Current Safety Concerns:  [place] Suicide Severity Rating Scale (Lifetime/Recent)  [place] Suicide Severity Rating (Lifetime/Recent) 12/8/2020 12/11/2020 5/7/2021   1. Wish to be Dead (Lifetime) Yes Yes Yes   Wish to be Dead Description (Lifetime) yes yes (No Data)   Comments - - si with chronic pain no attempts   1. Wish to be Dead (Recent) No No Yes   Wish to be Dead Description (Recent) - - (No Data)   Comments - - SI no plan or intent   2. Non-Specific Active Suicidal Thoughts (Lifetime) - - No   2. Non-Specific Active Suicidal Thoughts (Recent) - - No   3. Active Suicidal Ideation with any Methods (Not Plan) Without Intent to Act (Lifetime) - - No   3. Active Suicidal Ideation with any Methods (Not Plan) Without Intent to Act (Recent) - - No   Active Suicidal Ideation with any Methods (Not Plan) Description (Recent) - - n   4. Active Suicidal Ideation with Some Intent to Act, Without Specific Plan (Lifetime) - - No   4. Active Suicidal Ideation with Some Intent to Act, Without Specific Plan (Recent) - - No   Active Suicidal Ideation with Some Intent to Act, Without Specific Plan Description (Recent) - - n   5. Active Suicidal Ideation with Specific Plan and Intent (Lifetime) - - No   5. Active Suicidal Ideation with Specific Plan and Intent (Recent) - - No   Active Suicidal Ideation with Specific Plan and Intent Description (Recent) - - n   Most Severe Ideation Rating (Past Month) - - 1   Frequency (Past Month) - - 3   Duration (Past Month) - - 1   Controllability (Past Month) - - 1   Protective Factors (Past Month) - - 1   Reasons for Ideation (Past Month) - - 5   Actual Attempt (Lifetime) - - No   Actual Attempt (Past 3 Months) - - No   Has subject engaged in non-suicidal self-injurious behavior? (Lifetime) - - Yes   Has subject engaged in non-suicidal self-injurious behavior? (Past 3 Months) - - No   Interrupted Attempts (Lifetime) - - No   Interrupted Attempts (Past 3 Months) - - No   Aborted or  Self-Interrupted Attempt (Lifetime) - - No   Aborted or Self-Interrupted Attempt (Past 3 Months) - - No   Preparatory Acts or Behavior (Lifetime) - - No   Preparatory Acts or Behavior (Past 3 Months) - - No   Some encounter information is confidential and restricted. Go to Review Flowsheets activity to see all data.     Patient denies current homicidal ideation and behaviors.  Patient denies current self-injurious ideation and behaviors.    Patient denied risk behaviors associated with substance use.  Patient denies any high risk behaviors associated with mental health symptoms.  Patient reports the following current concerns for their personal safety: None.  Patient reports there are not  firearms in the house. .     History of Safety Concerns:  Patient reported a history of homicidal ideation.  Past HI managed with previous providers no attempts   Patient denied a history of personal safety concerns.    Patient denied a history of assaultive behaviors.    Patient denied a history of sexual assault behaviors.     Patient denied a history of risk behaviors associated with substance use.  Patient denies any history of high risk behaviors associated with mental health symptoms.  Patient reports the following protective factors: positive relationships positive social network and positive family connections and forward/future oriented thinking    Risk Plan:  See Recommendations for Safety and Risk Management Plan    Review of Symptoms per patient report:  Depression: Change in sleep, Lack of interest, Excessive or inappropriate guilt, Change in energy level, Difficulties concentrating, Suicidal ideation, Feelings of hopelessness, Feelings of helplessness, Ruminations, Irritability and Feeling sad, down, or depressed  [place]:  Elevated mood, Racing thoughts and Distractibility  Psychosis: past hx of AH/VH  Anxiety: Excessive worry, Nervousness, Physical complaints, such as headaches, stomachaches, muscle tension,  Separation anxiety, Social anxiety, Sleep disturbance, Ruminations, Poor concentration and Irritability  Panic:  No symptoms  Post Traumatic Stress Disorder:  Experienced traumatic event childhood trauma   Eating Disorder: No Symptoms  ADD / ADHD:  Inattentive, Distractibility, Forgetful and Interrupts  Conduct Disorder: No symptoms  Autism Spectrum Disorder: Deficits in social communication and social interactions and Deficits in developing, maintaining, and understanding relationships  Obsessive Compulsive Disorder: air typing    Patient reports the following compulsive behaviors and treatment history: none.      Diagnostic Criteria:   A. Excessive anxiety and worry about a number of events or activities (such as work or school performance).   B. The person finds it difficult to control the worry.  C. Select 3 or more symptoms (required for diagnosis). Only one item is required in children.   - Restlessness or feeling keyed up or on edge.    - Difficulty concentrating or mind going blank.    - Irritability.    - Muscle tension.    - Sleep disturbance (difficulty falling or staying asleep, or restless unsatisfying sleep).   D. The focus of the anxiety and worry is not confined to features of an Axis I disorder.  E. The anxiety, worry, or physical symptoms cause clinically significant distress or impairment in social, occupational, or other important areas of functioning.   F. The disturbance is not due to the direct physiological effects of a substance (e.g., a drug of abuse, a medication) or a general medical condition (e.g., hyperthyroidism) and does not occur exclusively during a Mood Disorder, a Psychotic Disorder, or a Pervasive Developmental Disorder.  CRITERIA (A-C) REPRESENT A MAJOR DEPRESSIVE EPISODE - SELECT THESE CRITERIA  A) Recurrent episode(s) - symptoms have been present during the same 2-week period and represent a change from previous functioning 5 or more symptoms (required for diagnosis)   -  Depressed mood. Note: In children and adolescents, can be irritable mood.     - Diminished interest or pleasure in all, or almost all, activities.    - Significant weight loss when not dieting decrease in appetite.    - Decreased sleep.    - Fatigue or loss of energy.    - Feelings of worthlessness or excessive guilt.    - Diminished ability to think or concentrate, or indecisiveness.    - Recurrent thoughts of death (not just fear of dying), recurrent suicidal ideation without a specific plan, or a suicide attempt or a specific plan for committing suicide.   B) The symptoms cause clinically significant distress or impairment in social, occupational, or other important areas of functioning  C) The episode is not attributable to the physiological effects of a substance or to another medical condition  D) The occurence of major depressive episode is not better explained by other thought / psychotic disorders  E) There has never been a manic episode or hypomanic episode    Functional Status:  Patient reports the following functional impairments: relationship(s), self-care, social interactions and work / vocational responsibilities.     WHODAS:   WHODAS 2.0 Total Score 5/4/2021   Total Score MyChart 32   Some encounter information is confidential and restricted. Go to Review Flowsheets activity to see all data.     Nonprogrammatic care:  Patient is requesting basic services to address current mental health concerns.    Clinical Summary:  1. Reason for assessment: mental health and stepping out of IOP  .  2. Psychosocial, Cultural and Contextual Factors: childhood trauma, social emotion .  3. Principal DSM5 Diagnoses  (Sustained by DSM5 Criteria Listed Above):   296.32 (F33.1) Major Depressive Disorder, Recurrent Episode, Moderate With mixed features  300.02 (F41.1) Generalized Anxiety Disorder.    5. Provisional Diagnosis:  296.89 Bipolar II Disorder Depressed  301.83 (F60.3) Borderline Personality Disorder as evidenced  by past dx.  6. Prognosis: Expect Improvement, Relieve Acute Symptoms and Maintain Current Status / Prevent Deterioration.  7. Likely consequences of symptoms if not treated: increase in symptoms.  8. Client strengths include:  educated, goal-focused, has a previous history of therapy, support of family, friends and providers, wants to learn and willing to ask questions .     Recommendations:     1. Plan for Safety and Risk Management:   A safety and risk management plan has been developed including: Patient has previous safety plan. Therapist reviewed crisis numbers and offered resource of [phone].          Report to child / adult protection services was NA.     2. Patient's identified isaac / Denominational / spiritual influences will be incorporated into care by assessing when patient is ready.     3. Initial Treatment will focus on:    Depressed Mood - motivation for change  Anxiety - managing emotions.     4. Resources/Service Plan:    services are not indicated.   Modifications to assist communication are not indicated.   Additional disability accommodations are not indicated.      5. Collaboration:   Collaboration / coordination of treatment will be initiated with the following  support professionals: none.      6.  Referrals:   The following referral(s) will be initiated: Outpatient Mental Paul Therapy. Next Scheduled Appointment: 5/2021.     A Release of Information has been obtained for the following: none at this time.    7. TRACEY:    TRACEY:  Discussed the general effects of drugs and alcohol on health and well-being.    8. Records:   These were reviewed at time of assessment.   Information in this assessment was obtained from the medical record and  provided by patient who is a limited historian.    Patient will have open access to their mental health medical record.      Provider Name/ Credentials:  SCOTT Sim  May 7, 2021

## 2021-05-08 ENCOUNTER — NURSE TRIAGE (OUTPATIENT)
Dept: NURSING | Facility: CLINIC | Age: 46
End: 2021-05-08

## 2021-05-08 ASSESSMENT — ANXIETY QUESTIONNAIRES: GAD7 TOTAL SCORE: 13

## 2021-05-09 NOTE — TELEPHONE ENCOUNTER
Joel calls and says that he has an abscess in his left groin and developed an itchy feeling in his left thigh on Friday. Pt. Says that now he has a deep ache in his left thigh. Left thigh ache = 4/10. Denies any redness, warmth, or swelling. Pt. Says that he is concerned about clots. Pt. says that he will take himself to the [facility] ER now. COVID 19 Nurse Triage Plan/Patient Instructions    Please be aware that novel coronavirus (COVID-19) may be circulating in the community. If you develop symptoms such as fever, cough, or SOB or if you have concerns about the presence of another infection including coronavirus (COVID-19), please contact your health care provider or visit [email or web address].     Disposition/Instructions    In-Person Visit with provider recommended. Reference Visit Selection Guide.    Thank you for taking steps to prevent the spread of this virus.  o Limit your contact with others.  o Wear a simple mask to cover your cough.  o Wash your hands well and often.    Resources    [facility]: About COVID-19: [email or web address]    CDC: What to Do If You're Sick: www.cdc.gov/coronavirus/2019-ncov/about/steps-when-sick.html    CDC: Ending Home Isolation: www.cdc.gov/coronavirus/2019-ncov/hcp/disposition-in-home-patients.html     CDC: Caring for Someone: www.cdc.gov/coronavirus/2019-ncov/if-you-are-sick/care-for-someone.html     [facility]: Interim Guidance for Hospital Discharge to Home: www.health[facility]/diseases/coronavirus/hcp/hospdischarge.pdf    [facility] clinical trials (COVID-19 research studies): [email or web address].edu/um-clinical-trials     Below are the COVID-19 hotlines at the [place] Department of Health ([facility]). Interpreters are available.   o For health questions: Call 861-063-1129 or 1-629.941.6382 (7 a.m. to 7 p.m.)  o For questions about schools and childcare: Call 941-900-2987 or 1-509.654.6854 (7 a.m. to 7 p.m.)                      Reason for Disposition    [1] Thigh or calf pain AND [2] only 1 side AND [3] present > 1 hour    Additional Information    Negative: Looks like a broken bone or dislocated joint (e.g., crooked or deformed)    Negative: Sounds like a life-threatening emergency to the triager    Negative: Followed a leg injury    Negative: Leg swelling is main symptom    Negative: Back pain radiating (shooting) into leg(s)    Negative: Knee pain is main symptom    Negative: Ankle pain is main symptom    Negative: Pregnant    Negative: Postpartum (from 0 to 6 weeks after delivery)    Negative: Chest pain    Negative: Difficulty breathing    Negative: Entire foot is cool or blue in comparison to other side    Negative: Unable to walk    Negative: [1] Red area or streak AND [2] fever    Negative: [1] Swollen joint AND [2] fever    Negative: [1] Cast on leg or ankle AND [2] now increased pain    Negative: Patient sounds very sick or weak to the triager    Negative: [1] SEVERE pain (e.g., excruciating, unable to do any normal activities) AND [2] not improved after 2 hours of pain medicine    Protocols used: LEG PAIN-A-AH

## 2021-05-12 ENCOUNTER — HOSPITAL ENCOUNTER (OUTPATIENT)
Dept: BEHAVIORAL HEALTH | Facility: CLINIC | Age: 46
End: 2021-05-12
Attending: PSYCHIATRY & NEUROLOGY
Payer: MEDICARE

## 2021-05-12 PROCEDURE — 90853 GROUP PSYCHOTHERAPY: CPT | Mod: GT | Performed by: SOCIAL WORKER

## 2021-05-12 NOTE — GROUP NOTE
Process Group Note    PATIENT'S NAME: Joel Pineda  MRN:   [id number]  :   1975  ACCT. NUMBER: [id number]  DATE OF SERVICE: 21  START TIME:  1:00 PM  END TIME:  1:50 PM  FACILITATOR: Magali Dodson LICSW  TOPIC:  Process Group    Diagnoses:  296.33 (F33.2) Major Depressive Disorder, Recurrent Episode, Severe _.  4. Other Diagnoses that is relevant to services:   300.00 (F41.9) Unspecified Anxiety Disorder  301.83 (F60.3) Borderline Personality Disorder.      [facility]  TRACK: [facility]    NUMBER OF PARTICIPANTS: 7    Telemedicine Visit: The patient's condition can be safely assessed and treated via synchronous audio and visual telemedicine encounter.      Reason for Telemedicine Visit: Services only offered telehealth    Originating Site (Patient Location): Patient's home    Distant Site (Provider Location): Provider Remote Setting    Consent:  The patient/guardian has verbally consented to: the potential risks and benefits of telemedicine (video visit) versus in person care; bill my insurance or make self-payment for services provided; and responsibility for payment of non-covered services.     Mode of Communication:  Video Conference via [employer]    As the provider I attest to compliance with applicable laws and regulations related to telemedicine.     Client participated in educational discussion on mindfulness and dealing with thought patterns.      Data:    Session content: At the start of this group, patients were invited to check in by identifying themselves, describing their current emotional status, and identifying issues to address in this group.   Area(s) of treatment focus addressed in this session included Symptom Management, Personal Safety and Community Resources/Discharge Planning.  Tito reported frustration that the new therapist from [facility] only had the 2 appointments scheduled for him then does not have openings until August.   "He stated that he felt very distressed as he has been waiting for these appointments for over two months.  He stated that he asked about options so the provider will look for another clinician, add him to the cancellation list, and place him on a wait list.  He stated that he was frustrated that if he had needed to schedule more than two appointments than he would liked to have been told or allowed to do so.  Peers shared frustration management skills.  Client denied suicidal ideation, intent and plan.     Therapeutic Interventions/Treatment Strategies:  Psychotherapist offered support, feedback and validation and reinforced use of skills. Treatment modalities used include Cognitive Behavioral Therapy. Interventions include Coping Skills: Discussed how the use of intentional \"in the moment\" actions can help reduce distress.    Assessment:    Patient response:   Patient responded to session by giving feedback, listening and focusing on goals    Possible barriers to participation / learning include: and no barriers identified    Health Issues:   None reported       Substance Use Review:   Substance Use: No active concerns identified.    Mental Status/Behavioral Observations  Appearance:   Appropriate   Eye Contact:   Good   Psychomotor Behavior: Normal   Attitude:   Cooperative  Friendly  Orientation:   All  Speech   Rate / Production: Normal    Volume:  Normal   Mood:    Anxious  Depressed   Affect:    Appropriate   Thought Content:   Clear  Thought Form:  Coherent  Logical     Insight:    Good     Plan:     Safety Plan: No current safety concerns identified.  Recommended that patient call 911 or go to the local ED should there be a change in any of these risk factors.     Barriers to treatment: None identified    Patient Contracts (see media tab):  None    Substance Use: Not addressed in session     Continue or Discharge: Patient will continue in Adult Day Treatment (ADT)  as planned. Patient is likely to benefit " from learning and using skills as they work toward the goals identified in their treatment plan.      Magali Dodson, [facility]  May 12, 2021

## 2021-05-13 ENCOUNTER — VIRTUAL VISIT (OUTPATIENT)
Dept: PSYCHOLOGY | Facility: CLINIC | Age: 46
End: 2021-05-13
Payer: MEDICARE

## 2021-05-13 ENCOUNTER — FCC EXTENDED DOCUMENTATION (OUTPATIENT)
Dept: PSYCHOLOGY | Facility: CLINIC | Age: 46
End: 2021-05-13

## 2021-05-13 DIAGNOSIS — F33.1 MDD (MAJOR DEPRESSIVE DISORDER), RECURRENT EPISODE, MODERATE (H): Primary | ICD-10-CM

## 2021-05-13 DIAGNOSIS — F41.1 GENERALIZED ANXIETY DISORDER: ICD-10-CM

## 2021-05-13 PROCEDURE — 90837 PSYTX W PT 60 MINUTES: CPT | Mod: 95 | Performed by: SOCIAL WORKER

## 2021-05-13 ASSESSMENT — ANXIETY QUESTIONNAIRES
4. TROUBLE RELAXING: NEARLY EVERY DAY
1. FEELING NERVOUS, ANXIOUS, OR ON EDGE: MORE THAN HALF THE DAYS
6. BECOMING EASILY ANNOYED OR IRRITABLE: NEARLY EVERY DAY
5. BEING SO RESTLESS THAT IT IS HARD TO SIT STILL: SEVERAL DAYS
2. NOT BEING ABLE TO STOP OR CONTROL WORRYING: NEARLY EVERY DAY
3. WORRYING TOO MUCH ABOUT DIFFERENT THINGS: MORE THAN HALF THE DAYS
7. FEELING AFRAID AS IF SOMETHING AWFUL MIGHT HAPPEN: MORE THAN HALF THE DAYS
GAD7 TOTAL SCORE: 16

## 2021-05-13 ASSESSMENT — PATIENT HEALTH QUESTIONNAIRE - PHQ9: SUM OF ALL RESPONSES TO PHQ QUESTIONS 1-9: 17

## 2021-05-13 NOTE — PROGRESS NOTES
Progress Note    Patient Name: Joel Pineda  Date: 2021           Service Type: Individual      Session Start Time: 1403  Session End Time: 145     Session Length: 53 +    Session #: 1    Attendees: Client    Service Modality:  Video Visit:      Provider verified identity through the following two step process.  Patient provided:  Patient     Telemedicine Visit: The patient's condition can be safely assessed and treated via synchronous audio and visual telemedicine encounter.      Reason for Telemedicine Visit: Services only offered telehealth    Originating Site (Patient Location): Patient's home    Distant Site (Provider Location): Provider Remote Setting    Consent:  The patient/guardian has verbally consented to: the potential risks and benefits of telemedicine (video visit) versus in person care; bill my insurance or make self-payment for services provided; and responsibility for payment of non-covered services.     Patient would like the video invitation sent by:  Send to e-mail at: [email or web address]    Mode of Communication:  Video Conference via [employer]    As the provider I attest to compliance with applicable laws and regulations related to telemedicine.     Treatment Plan Last Reviewed: 2021 due 2021  PHQ-9 / YUDI-7 :     DATA  Interactive Complexity: No  Crisis: No       Progress Since Last Session (Related to Symptoms / Goals / Homework):   Symptoms: Worsening increase YUDI-7    Homework: Completed in session      Episode of Care Goals: No improvement - CONTEMPLATION (Considering change and yet undecided); Intervened by assessing the negative and positive thinking (ambivalence) about behavior change     Current / Ongoing Stressors and Concerns:  Patient will demonstrate and report a level of depression that can be managed at a lower level of care.  Absence of persistent depression mood and report of reduced frequency and  intensity of low mood and absence of persistent low energy and motivation to acceptable levels, report subjective improved motivation and increased energy for a period of 90 days, within 6 months as clinically observed and by patient self-report.  Patient will demonstrate and report a level of anxiety that can be managed at a lower level of care.  Absence of persistent anxious mood and report of reduced frequency and intensity of worry and absence of persistent anxious mood to acceptable levels, no panic attacks, report subjective comfort with rumination for a period of 90 days, within 6 months as clinically observed and by patient self-report.    Patient will demonstrate control of impulses and ability to use positive coping tools to manage strong emotions that can be safely addressed at a lower level of care. Absence of persistent SI and report of reduced frequency and intensity of SI and absence of SI to acceptable levels, report subjective improved mood for a period of 90 days, within 6 months as clinically observed and by patient self-report.       Treatment Objective(s) Addressed in This Session:      childhood trauma, family conflict, chronic pain     Intervention:   Motivational Interviewing    MI Intervention: Co-Developed Goal: anxiety and depression, Expressed Empathy/Understanding, Supported Autonomy, Collaboration, Evocation, Permission to raise concern or advise, Open-ended questions and Reflections: simple and complex     Change Talk Expressed by the Patient: Desire to change Ability to change Reasons to change Need to change    Provider Response to Change Talk: E - Evoked more info from patient about behavior change, A - Affirmed patient's thoughts, decisions, or attempts at behavior change, R - Reflected patient's change talk and S - Summarized patient's change talk statements          ASSESSMENT: Current Emotional / Mental Status (status of significant symptoms):   Risk status (Self / Other harm or  suicidal ideation)   Patient denies current fears or concerns for personal safety.   Patient reports the following current or recent suicidal ideation or behaviors: 1/5, 5 being high  patient reported no plan and contracted for safety.   Patient denies current or recent homicidal ideation or behaviors.   Patient denies current or recent self injurious behavior or ideation.   Patient denies other safety concerns.   Patient reports there has been no change in risk factors since their last session.     Patient reports there has been no change in protective factors since their last session.    A safety and risk management plan has been developed including: Patient consented to co-developed safety plan.  Safety and risk management plan was completed.  Patient agreed to use safety plan should any safety concerns arise.  A copy was given to the patient.     Appearance:   Appropriate    Eye Contact:   Fair    Psychomotor Behavior: Agitated  Restless    Attitude:   Cooperative  Friendly Pleasant   Orientation:   All   Speech    Rate / Production: Emotional Talkative    Volume:  Normal    Mood:    Anxious  Depressed  Agitated   Affect:    Worrisome    Thought Content:  Clear    Thought Form:  Coherent    Insight:    Fair      Medication Review:   Changes to psychiatric medications, see updated Medication List in EPIC.      Medication Compliance:   Yes     Changes in Health Issues:   Yes: Pain, Associated Psychological Distress     Chemical Use Review:   Substance Use: Chemical use reviewed, no active concerns identified      Tobacco Use: No current tobacco use.      Diagnosis:  1. MDD (major depressive disorder), recurrent episode, moderate (H)    2. Generalized anxiety disorder          R/O  296.89 Bipolar II Disorder Depressed  301.83 (F60.3) Borderline Personality Disorder as evidenced by past dx.    Collateral Reports Completed:   Communicated with: [facility] Rowley, patient will be transferring    PLAN: (Patient Tasks /  Therapist Tasks / Other)  Transfer to [facility]  Patient to follow safety plan and go to ED if he cannot remain safe  Utilize effective coping and grounding skills  Make plans with mom        Jenny Anaya, [facility]  5/13/2021                                                         ______________________________________________________________________    Treatment Plan    Patient's Name: Joel Pineda  YOB: 1975    Date: 5/13/2021    DSM5 Diagnoses: 296.32 (F33.1) Major Depressive Disorder, Recurrent Episode, Moderate With mixed features or 300.02 (F41.1) Generalized Anxiety Disorder  Psychosocial / Contextual Factors: childhood trauma, family conflict, chronic pain  WHODAS: 32    Referral / Collaboration:  referral made for transfer to [facility].    Anticipated number of session or this episode of care: 16      MeasurableTreatment Goal(s) related to diagnosis / functional impairment(s)  Goal 1: Patient will report absence of persistent depression mood and report of reduced frequency and intensity of low mood and absence of persistent low energy and motivation to acceptable levels, report subjective improved motivation and increased energy for a period of 90 days, within 6 months as clinically observed and by patient self-report    I will know I've met my goal when my action and motivation and action match.      Objective #A (Patient Action)    Patient will demonstrate and report a level of depression that can be managed at a lower level of care.  Absence of persistent depression mood and report of reduced frequency and intensity of low mood and absence of persistent low energy and motivation to acceptable levels, report subjective improved motivation and increased energy for a period of 90 days, within 6 months as clinically observed and by patient self-report.  Status: New - Date: 5/13/2021     Intervention(s)  Therapist will provide individual therapy to identify triggers to depression,  gain feedback on helpful coping tools and thought-reframing techniques, and identify preferred way of being.  Tx to include discussion of current stressors and interpersonal conflicts and how to cope with these, coaching, diagnostic testing, referral for medication as indicated, use prescribed medication, cognitive restructuring, interpersonal, family therapy, supportive therapy services.        Goal 2: Patient will report absence of persistent anxiety mood and report of reduced frequency and intensity of worry and absence of persistent anxious mood to acceptable levels, no panic attacks, report subjective comfort with rumination for a period of 90 days. Within 6 months as clinically observed and by patient self-report    I will know I've met my goal when I am not too trouble by what goes on around me.      Objective #A (Patient Action)    Status: New - Date: 5/13/2021     Patient will demonstrate and report a level of anxiety that can be managed at a lower level of care.  Absence of persistent anxious mood and report of reduced frequency and intensity of worry and absence of persistent anxious mood to acceptable levels, no panic attacks, report subjective comfort with rumination for a period of 90 days, within 6 months as clinically observed and by patient self-report.    Intervention(s)  Therapist will provide individual therapy to identify triggers to anxiety, gain feedback on helpful coping tools and thought-reframing techniques, and identify preferred way of being. Tx to include discuss current stressors and interpersonal conflicts and how to cope with these, coaching, diagnostic testing , referral for medication as indicated, use prescribed medication, cognitive restructuring, interpersonal,   family therapy, supportive therapy services.        Goal 3: Patiient will report absence of persistent SI and report of reduced frequency and intensity of SI and absence of SI to acceptable levels, report subjective  improved mood for a period of 90 days, within 6 months as clinically observed and by patient self-report    I will know I've met my goal when I no longer struggle with them daily.      Objective #A (Patient Action)    Status: New - Date: 5/13/2021     Patient will demonstrate control of impulses and ability to use positive coping tools to manage strong emotions that can be safely addressed at a lower level of care. Absence of persistent SI and report of reduced frequency and intensity of SI and absence of SI to acceptable levels, report subjective improved mood for a period of 90 days, within 6 months as clinically observed and by patient self-report.    Intervention(s)  Therapist will provide individual therapy to identify triggers to SI urges, gain feedback on helpful coping strategies, and identify ways that family can offer support. Tx to discuss current stressors and interpersonal conflicts and how to cope with these, coaching, diagnostic testing, referral for medication as indicated, use prescribed medication, cognitive restructuring, interpersonal family therapy, supportive therapy services.      Patient has reviewed and agreed to the above plan.      Jenny Anaya, [facility]  May 13, 2021                                                   Joel Pineda     SAFETY PLAN:  Step 1: Warning signs / cues (Thoughts, images, mood, situation, behavior) that a crisis may be developing:    Thoughts: I'd rather be gone than feel this pain    Images: none    Thinking Processes: disorganized thinking: reported hallucinations in the past    Mood: worsening depression, hopelessness, helplessness, agitation and increase in pain    Behaviors: hygiene    Situations: pain   Step 2: Coping strategies - Things I can do to take my mind off of my problems without contacting another person (relaxation technique, physical activity):    Distress Tolerance Strategies:  relaxation activities: watch concerts on [employer], play with my pet   "and training cats.    Physical Activities: go for a walk and deep breathing    Focus on helpful thoughts:  \"I will get through this\", \"It always passes\", \"Ride the wave\" and I don't want to be locked up for three days  Step 3: People and social settings that provide distraction:   Name:  Phone: [phone]   Name: Quinn Phone: [phone]   Name: Tono Phone: [phone]    [place]   Step 4: Remind myself of people and things that are important to me and worth living for:  My family and cats      Step 5: When I am in crisis, I can ask these people to help me use my safety plan:   Name:  Phone: [phone]   Name: Quinn Phone: [phone]   Name: Tono Phone: [phone]  Step 6: Making the environment safe:     be around others  Step 7: Professionals or agencies I can contact during a crisis:    [facility] Daytime Number: [phone]    Suicide Prevention Lifeline: 1-574-258-BIRN (9892)    Crisis Text Line Service (available 24 hours a day, 7 days a week): Text [place] to 008234  Local Crisis Services: 988    Call 911 or go to my nearest emergency department.   I helped develop this safety plan and agree to use it when needed.  I have been given a copy of this plan.      Client signature _________________________________________________________________  Today s date:  5/13/2021  Adapted from Safety Plan Template 2008 Shanita Lazcano and Ariel Lorenzo is reprinted with the express permission of the authors.  No portion of the Safety Plan Template may be reproduced without the express, written permission.  You can contact the authors at [email or web address] or [email or web address].            "

## 2021-05-13 NOTE — PROGRESS NOTES
Discharge Summary  Single Session    Client Name: Joel Pineda MRN#: [id number] YOB: 1975      Intake / Discharge Date: 5/7/201-5/13/2021      DSM5 Diagnoses: (Sustained by DSM5 Criteria Listed Above)  Diagnoses: 296.32 (F33.1) Major Depressive Disorder, Recurrent Episode, Moderate With mixed features  300.02 (F41.1) Generalized Anxiety Disorder  Psychosocial & Contextual Factors: childhood trauma, family conflict and communication  WHODAS 2.0 (12 item) Score: 32          Presenting Concern:  Depression, anxiety, SI      Reason for Discharge:  Referred to [facility]      Disposition at Time of Last Encounter:   Comments:   Excited to transfer to someone with weekly openings    Graduating IOP in 3 weeks      Risk Management:   Client has had a history of SI, HI and SIB  A safety and risk management plan has been developed including: Patient consented to co-developed safety plan.  Safety and risk management plan was completed.  Patient agreed to use safety plan should any safety concerns arise.  A copy was given to the patient.      Referred To:  FIORELLA Diaz, [facility]        SCOTT Presley   5/13/2021

## 2021-05-14 ASSESSMENT — ANXIETY QUESTIONNAIRES: GAD7 TOTAL SCORE: 16

## 2021-05-17 ENCOUNTER — VIRTUAL VISIT (OUTPATIENT)
Dept: PSYCHOLOGY | Facility: CLINIC | Age: 46
End: 2021-05-17
Payer: MEDICARE

## 2021-05-17 DIAGNOSIS — F33.1 MDD (MAJOR DEPRESSIVE DISORDER), RECURRENT EPISODE, MODERATE (H): Primary | ICD-10-CM

## 2021-05-17 DIAGNOSIS — F41.1 GENERALIZED ANXIETY DISORDER: ICD-10-CM

## 2021-05-17 PROCEDURE — 90834 PSYTX W PT 45 MINUTES: CPT | Mod: 95 | Performed by: SOCIAL WORKER

## 2021-05-17 NOTE — PROGRESS NOTES
Patient Active Problem List   Diagnosis     Major depressive disorder, recurrent episode (H)     Intermittent asthma     Hyperlipidemia LDL goal <100     Chronic nonallergic rhinitis     Diverticulosis     GERD (gastroesophageal reflux disease)     Anxiety     LLOYD (obstructive sleep apnea)- mild (AHI 11)     Intracranial arachnoid cyst     Facet arthritis of cervical region     Acquired hypothyroidism     Bipolar 2 disorder (H)     Chronic midline low back pain without sciatica     Irritable bowel syndrome with diarrhea     B12 deficiency     Essential hypertension with goal blood pressure less than 140/90     Chronic pain syndrome     Ankylosing spondylitis of sacral region (H)     Morbid obesity due to hypertriglyceridemia (H)     Fatty infiltration of liver     DDD (degenerative disc disease), lumbar     Type 2 diabetes mellitus with complication, without long-term current use of insulin (H)     Peripheral polyneuropathy     History of pulmonary embolism     Ingrown toenail     Hypertriglyceridemia     Gastroparesis     Orthostatic dizziness     POTS (postural orthostatic tachycardia syndrome)     PHN (postherpetic neuralgia)     Dysautonomia (H)     Major depressive disorder, recurrent episode, severe (H)       Current Outpatient Medications:      acetaminophen 500 MG CAPS, Take 1,000 mg by mouth 2 times daily , Disp: 60 capsule, Rfl:      albuterol (PROAIR HFA/PROVENTIL HFA/VENTOLIN HFA) 108 (90 Base) MCG/ACT inhaler, Inhale 2 puffs into the lungs every 4 hours as needed for shortness of breath / dyspnea or wheezing, Disp: 8.5 g, Rfl: 11     aspirin (ASA) 81 MG tablet, Take 81 mg by mouth daily , Disp: , Rfl:      baclofen (LIORESAL) 10 MG tablet, Take 0.5-1 tablets (5-10 mg) by mouth 2 times daily, Disp: 60 tablet, Rfl: 1     cetirizine (ZYRTEC) 10 MG tablet, Take 1 tablet (10 mg) by mouth At Bedtime, Disp: 30 tablet, Rfl: 11     cholecalciferol (D3-50) 1250 mcg (92644 units) capsule, TAKE 1 CAPSULE BY MOUTH  EVERY 2 WEEKS, Disp: 24 capsule, Rfl: 0     clonazePAM (KLONOPIN) 0.5 MG tablet, Take 0.5 mg by mouth At Bedtime , Disp: , Rfl:      cyanocobalamin (CYANOCOBALAMIN) 1000 MCG/ML injection, Inject 1 mL (1,000 mcg) into the muscle every 30 days, Disp: 10 mL, Rfl: 0     DULoxetine (CYMBALTA) 60 MG capsule, Take 60 mg by mouth 2 times daily , Disp: , Rfl:      EPINEPHrine (ANY BX GENERIC EQUIV) 0.3 MG/0.3ML injection 2-pack, Inject 0.3 mLs (0.3 mg) into the muscle once as needed for anaphylaxis, Disp: 0.6 mL, Rfl: 3     etanercept (ENBREL SURECLICK) 50 MG/ML autoinjector, Inject 50 mg Subcutaneous once a week . Hold for signs of infection, and seek medical attention., Disp: 4 mL, Rfl: 12     famotidine (PEPCID) 20 MG tablet, Prior to administration of Humira every 2 weeks (Patient taking differently: Take 20 mg by mouth every 7 days Prior to Enbrel Injections to prevent skin reaction), Disp: , Rfl:      fluticasone (FLONASE) 50 MCG/ACT nasal spray, Spray 1 spray into both nostrils daily, Disp: , Rfl:      fluticasone-salmeterol (ADVAIR) 500-50 MCG/DOSE inhaler, Inhale 1 puff into the lungs every 12 hours, Disp: 3 each, Rfl: 3     glipiZIDE (GLUCOTROL XL) 5 MG 24 hr tablet, Take 1 tablet (5 mg) by mouth daily, Disp: 90 tablet, Rfl: 1     lamoTRIgine (LAMICTAL) 100 MG tablet, Take 200 mg by mouth daily, Disp: , Rfl:      levothyroxine (SYNTHROID/LEVOTHROID) 75 MCG tablet, TAKE 1 TABLET EVERY MORNING, Disp: 90 tablet, Rfl: 3     lidocaine (XYLOCAINE) 5 % external ointment, Apply topically 4 times daily as needed (pain), Disp: 50 g, Rfl: 2     melatonin 3 MG tablet, Take 9 mg by mouth nightly as needed , Disp: , Rfl:      metoclopramide (REGLAN) 5 MG tablet, Take 5 mg by mouth 2 times daily, Disp: , Rfl:      metoprolol succinate ER (TOPROL-XL) 200 MG 24 hr tablet, Take 1 tablet (200 mg) by mouth 2 times daily, Disp: 180 tablet, Rfl: 1     montelukast (SINGULAIR) 10 MG tablet, Take 1 tablet (10 mg) by mouth every evening,  Disp: 90 tablet, Rfl: 1     nabumetone (RELAFEN) 500 MG tablet, TAKE 1-2 TABLETS BY MOUTH TWICE DAILY WITH MEALS AS NEEDED FOR BACK/JOINT PAIN, Disp: 60 tablet, Rfl: 2     naloxone (NARCAN) 4 MG/0.1ML nasal spray, Spray 1 spray (4 mg) into one nostril alternating nostrils as needed for opioid reversal every 2-3 minutes until assistance arrives, Disp: 0.2 mL, Rfl: 0     olopatadine (PATADAY) 0.2 % ophthalmic solution, Place 1 drop into both eyes daily, Disp: 2.5 mL, Rfl: 3     omega-3 acid ethyl esters (LOVAZA) 1 g capsule, TAKE 2 CAPSULES BY MOUTH TWICE DAILY , Disp: 360 capsule, Rfl: 1     omeprazole 20 MG tablet, Take 20 mg by mouth daily , Disp: , Rfl:      order for DME, Equipment being ordered: Assure Compression stockings (ANNETTA) Large, closed toe, thigh-high 30-40 compression, Disp: 2 Units, Rfl: 3     order for DME, Equipment being ordered: lumbosacral belt/brace, Disp: 1 Units, Rfl: 0     order for DME, Respironics REMSTAR 60 Series Auto CPAP 9-13 cm H2O, Wisp nasal mask w/a large cushion and a chinstrap, Disp: , Rfl:      oxyCODONE (ROXICODONE) 5 MG tablet, Take 1-2 tablets (5-10 mg) by mouth every 6 hours as needed for pain (maximum 4 tablet(s) per day), Disp: 35 tablet, Rfl: 0     polyethylene glycol (MIRALAX) 17 g packet, Take 1 packet by mouth daily, Disp: , Rfl:      pregabalin (LYRICA) 150 MG capsule, Take 1 capsule (150 mg) by mouth 3 times daily, Disp: 90 capsule, Rfl: 5     QUEtiapine (SEROQUEL) 100 MG tablet, Take 100 mg by mouth At Bedtime, Disp: , Rfl:      ramipril (ALTACE) 10 MG capsule, Take 1 capsule (10 mg) by mouth daily, Disp: 90 capsule, Rfl: 1     rizatriptan (MAXALT-MLT) 5 MG ODT, DISSOLVE 1 TABLET BY MOUTH AT ONSET OF HEADACHE, Disp: 30 tablet, Rfl: 0     rosuvastatin (CRESTOR) 40 MG tablet, Take 1 tablet (40 mg) by mouth daily, Disp: 90 tablet, Rfl: 2     syringe, disposable, (BD TUBERCULIN SYRINGE) 1 ML MISC, Equipment being ordered: 1 ml tuberculin syringes to be used for Vitamin  B12 injections., Disp: 12 each, Rfl: 11     vitamin B complex with vitamin C (STRESS TAB) tablet, Take 1 tablet by mouth daily, Disp: , Rfl:      ZINC SULFATE-VITAMIN C MT, Take 1 tablet by mouth daily, Disp: , Rfl:   Psychiatry staffing: case discussed  Diagnosis:  As above;  Working on symptom management

## 2021-05-17 NOTE — PROGRESS NOTES
Progress Note    Patient Name: Joel Pineda  Date: 2021           Service Type: Individual      Session Start Time: 9am  Session End Time: 9:45am     Session Length: 45 minutes    Session #: 1- transfer from [facility]    Attendees: Client    Service Modality:  Video Visit:      Provider verified identity through the following two step process.  Patient provided:  Patient     Telemedicine Visit: The patient's condition can be safely assessed and treated via synchronous audio and visual telemedicine encounter.      Reason for Telemedicine Visit: Services only offered telehealth    Originating Site (Patient Location): Patient's home    Distant Site (Provider Location): Provider Remote Setting    Consent:  The patient/guardian has verbally consented to: the potential risks and benefits of telemedicine (video visit) versus in person care; bill my insurance or make self-payment for services provided; and responsibility for payment of non-covered services.     Patient would like the video invitation sent by:  Send to e-mail at: [email or web address]    Mode of Communication:  Video Conference via [employer]    As the provider I attest to compliance with applicable laws and regulations related to telemedicine.     Treatment Plan Last Reviewed: 2021 due 2021  PHQ-9 / YUDI-7 :     DATA  Interactive Complexity: No  Crisis: No       Progress Since Last Session (Related to Symptoms / Goals / Homework):   Symptoms: No change    Homework: Completed in session      Episode of Care Goals: No improvement - CONTEMPLATION (Considering change and yet undecided); Intervened by assessing the negative and positive thinking (ambivalence) about behavior change     Current / Ongoing Stressors and Concerns:  Therapist and client review diagnostic assessment and reviewed client's clinical history. Discussed symptoms related to depression and ruling out whether or not  bipolar disorder is present. Reviewed client's treatment history including DBT, Day treatment, and recently aftercare group as well as ongoing psychiatry.        Treatment Objective(s) Addressed in This Session:      childhood trauma, family conflict, chronic pain     Intervention:   Motivational Interviewing    MI Intervention: Co-Developed Goal: anxiety and depression, Expressed Empathy/Understanding, Supported Autonomy, Collaboration, Evocation, Permission to raise concern or advise, Open-ended questions and Reflections: simple and complex     Change Talk Expressed by the Patient: Desire to change Ability to change Reasons to change Need to change    Provider Response to Change Talk: E - Evoked more info from patient about behavior change, A - Affirmed patient's thoughts, decisions, or attempts at behavior change, R - Reflected patient's change talk and S - Summarized patient's change talk statements     Clinical review of history and treatment        ASSESSMENT: Current Emotional / Mental Status (status of significant symptoms):   Risk status (Self / Other harm or suicidal ideation)   Patient denies current fears or concerns for personal safety.   Patient reports the following current or recent suicidal ideation or behaviors: 1/5, 5 being high  patient reported no plan and contracted for safety.   Patient denies current or recent homicidal ideation or behaviors.   Patient denies current or recent self injurious behavior or ideation.   Patient denies other safety concerns.   Patient reports there has been no change in risk factors since their last session.     Patient reports there has been no change in protective factors since their last session.    A safety and risk management plan has been developed including: Patient consented to co-developed safety plan.  Safety and risk management plan was completed.  Patient agreed to use safety plan should any safety concerns arise.  A copy was given to the  patient.     Appearance:   Appropriate    Eye Contact:   Fair    Psychomotor Behavior: Agitated  Restless    Attitude:   Cooperative  Friendly Pleasant   Orientation:   All   Speech    Rate / Production: Emotional Talkative    Volume:  Normal    Mood:    Anxious  Depressed  Agitated   Affect:    Worrisome    Thought Content:  Clear    Thought Form:  Coherent    Insight:    Fair      Medication Review:   Changes to psychiatric medications, see updated Medication List in EPIC.      Medication Compliance:   Yes     Changes in Health Issues:   Yes: Pain, Associated Psychological Distress     Chemical Use Review:   Substance Use: Chemical use reviewed, no active concerns identified      Tobacco Use: No current tobacco use.      Diagnosis:  1. MDD (major depressive disorder), recurrent episode, moderate (H)    2. Generalized anxiety disorder          R/O  296.89 Bipolar II Disorder Depressed  301.83 (F60.3) Borderline Personality Disorder as evidenced by past dx.    Collateral Reports Completed:   Not Applicable    PLAN: (Patient Tasks / Therapist Tasks / Other)  Patient to follow safety plan and go to ED if he cannot remain safe  Utilize effective coping and grounding skills        Carolynn Rowley  5/17/2021                                                         ______________________________________________________________________    Treatment Plan    Patient's Name: Joel Pineda  YOB: 1975    Date: 5/13/2021    DSM5 Diagnoses: 296.32 (F33.1) Major Depressive Disorder, Recurrent Episode, Moderate With mixed features or 300.02 (F41.1) Generalized Anxiety Disorder  Psychosocial / Contextual Factors: childhood trauma, family conflict, chronic pain  WHODAS: 32    Referral / Collaboration:  referral made for transfer to [facility].    Anticipated number of session or this episode of care: 16      MeasurableTreatment Goal(s) related to diagnosis / functional impairment(s)  Goal 1: Patient will report absence  of persistent depression mood and report of reduced frequency and intensity of low mood and absence of persistent low energy and motivation to acceptable levels, report subjective improved motivation and increased energy for a period of 90 days, within 6 months as clinically observed and by patient self-report    I will know I've met my goal when my action and motivation and action match.      Objective #A (Patient Action)    Patient will demonstrate and report a level of depression that can be managed at a lower level of care.  Absence of persistent depression mood and report of reduced frequency and intensity of low mood and absence of persistent low energy and motivation to acceptable levels, report subjective improved motivation and increased energy for a period of 90 days, within 6 months as clinically observed and by patient self-report.  Status: New - Date: 5/13/2021     Intervention(s)  Therapist will provide individual therapy to identify triggers to depression, gain feedback on helpful coping tools and thought-reframing techniques, and identify preferred way of being.  Tx to include discussion of current stressors and interpersonal conflicts and how to cope with these, coaching, diagnostic testing, referral for medication as indicated, use prescribed medication, cognitive restructuring, interpersonal, family therapy, supportive therapy services.        Goal 2: Patient will report absence of persistent anxiety mood and report of reduced frequency and intensity of worry and absence of persistent anxious mood to acceptable levels, no panic attacks, report subjective comfort with rumination for a period of 90 days. Within 6 months as clinically observed and by patient self-report    I will know I've met my goal when I am not too trouble by what goes on around me.      Objective #A (Patient Action)    Status: New - Date: 5/13/2021     Patient will demonstrate and report a level of anxiety that can be managed at  a lower level of care.  Absence of persistent anxious mood and report of reduced frequency and intensity of worry and absence of persistent anxious mood to acceptable levels, no panic attacks, report subjective comfort with rumination for a period of 90 days, within 6 months as clinically observed and by patient self-report.    Intervention(s)  Therapist will provide individual therapy to identify triggers to anxiety, gain feedback on helpful coping tools and thought-reframing techniques, and identify preferred way of being. Tx to include discuss current stressors and interpersonal conflicts and how to cope with these, coaching, diagnostic testing , referral for medication as indicated, use prescribed medication, cognitive restructuring, interpersonal,   family therapy, supportive therapy services.        Goal 3: Patiient will report absence of persistent SI and report of reduced frequency and intensity of SI and absence of SI to acceptable levels, report subjective improved mood for a period of 90 days, within 6 months as clinically observed and by patient self-report    I will know I've met my goal when I no longer struggle with them daily.      Objective #A (Patient Action)    Status: New - Date: 5/13/2021     Patient will demonstrate control of impulses and ability to use positive coping tools to manage strong emotions that can be safely addressed at a lower level of care. Absence of persistent SI and report of reduced frequency and intensity of SI and absence of SI to acceptable levels, report subjective improved mood for a period of 90 days, within 6 months as clinically observed and by patient self-report.    Intervention(s)  Therapist will provide individual therapy to identify triggers to SI urges, gain feedback on helpful coping strategies, and identify ways that family can offer support. Tx to discuss current stressors and interpersonal conflicts and how to cope with these, coaching, diagnostic testing,  "referral for medication as indicated, use prescribed medication, cognitive restructuring, interpersonal family therapy, supportive therapy services.      Patient has reviewed and agreed to the above plan.      Carolynn Rowley  May 17, 2021                                                   Joel Pineda     SAFETY PLAN:  Step 1: Warning signs / cues (Thoughts, images, mood, situation, behavior) that a crisis may be developing:    Thoughts: I'd rather be gone than feel this pain    Images: none    Thinking Processes: disorganized thinking: reported hallucinations in the past    Mood: worsening depression, hopelessness, helplessness, agitation and increase in pain    Behaviors: hygiene    Situations: pain   Step 2: Coping strategies - Things I can do to take my mind off of my problems without contacting another person (relaxation technique, physical activity):    Distress Tolerance Strategies:  relaxation activities: watch concerts on [facility], play with my pet  and training cats.    Physical Activities: go for a walk and deep breathing    Focus on helpful thoughts:  \"I will get through this\", \"It always passes\", \"Ride the wave\" and I don't want to be locked up for three days  Step 3: People and social settings that provide distraction:   Name:  Phone: [phone]   Name: Quinn Phone: [phone]   Name: Tono Phone: [phone]    [place]   Step 4: Remind myself of people and things that are important to me and worth living for:  My family and cats      Step 5: When I am in crisis, I can ask these people to help me use my safety plan:   Name:  Phone: [phone]   Name: Quinn Phone: [phone]   Name: Tono Phone: [phone]  Step 6: Making the environment safe:     be around others  Step 7: Professionals or agencies I can contact during a crisis:    [facility] Daytime Number: [phone]    Suicide Prevention Lifeline: 3-096-811-JUOV (7581)    Crisis Text Line Service (available 24 hours a " day, 7 days a week): Text [place] to 785465  Local Crisis Services: 988    Call 911 or go to my nearest emergency department.   I helped develop this safety plan and agree to use it when needed.  I have been given a copy of this plan.      Client signature _________________________________________________________________  Today s date:  5/13/2021  Adapted from Safety Plan Template 2008 Shanita Lazcano and Ariel Lorenzo is reprinted with the express permission of the authors.  No portion of the Safety Plan Template may be reproduced without the express, written permission.  You can contact the authors at [email or web address] or [email or web address].

## 2021-05-18 ENCOUNTER — MYC REFILL (OUTPATIENT)
Dept: FAMILY MEDICINE | Facility: CLINIC | Age: 46
End: 2021-05-18

## 2021-05-18 DIAGNOSIS — J45.30 MILD PERSISTENT ASTHMA: ICD-10-CM

## 2021-05-18 DIAGNOSIS — M51.369 DDD (DEGENERATIVE DISC DISEASE), LUMBAR: ICD-10-CM

## 2021-05-18 DIAGNOSIS — M45.8 ANKYLOSING SPONDYLITIS OF SACRAL REGION (H): ICD-10-CM

## 2021-05-18 RX ORDER — OXYCODONE HYDROCHLORIDE 5 MG/1
5-10 TABLET ORAL EVERY 6 HOURS PRN
Qty: 35 TABLET | Refills: 0 | Status: SHIPPED | OUTPATIENT
Start: 2021-05-18 | End: 2021-05-26

## 2021-05-18 NOTE — TELEPHONE ENCOUNTER
Routing refill request to provider for review/approval because:  Drug not on the [facility] refill protocol     Sapna ALEJANDRON, RN

## 2021-05-18 NOTE — TELEPHONE ENCOUNTER
montelukast (SINGULAIR) 10 MG tablet  Last Written Prescription Date:  11/16/2020  Last Fill Quantity: 90,   # refills: 1  Last Office Visit : 6/11/2020  Future Office visit:  6/9/2021  Refer to Provider due to we do not refill medications for [facility].   Thank you       Lenore Sauceda RN  Central Triage Red Flags/Med Refills

## 2021-05-19 RX ORDER — MONTELUKAST SODIUM 10 MG/1
10 TABLET ORAL EVERY EVENING
Qty: 90 TABLET | Refills: 3 | Status: SHIPPED | OUTPATIENT
Start: 2021-05-19 | End: 2022-05-31

## 2021-05-20 ENCOUNTER — TELEPHONE (OUTPATIENT)
Dept: PODIATRY | Facility: CLINIC | Age: 46
End: 2021-05-20

## 2021-05-20 ENCOUNTER — OFFICE VISIT (OUTPATIENT)
Dept: PODIATRY | Facility: CLINIC | Age: 46
End: 2021-05-20
Payer: MEDICARE

## 2021-05-20 ENCOUNTER — ANCILLARY PROCEDURE (OUTPATIENT)
Dept: GENERAL RADIOLOGY | Facility: CLINIC | Age: 46
End: 2021-05-20
Attending: PODIATRIST
Payer: MEDICARE

## 2021-05-20 VITALS
BODY MASS INDEX: 36.45 KG/M2 | DIASTOLIC BLOOD PRESSURE: 76 MMHG | HEIGHT: 78 IN | SYSTOLIC BLOOD PRESSURE: 118 MMHG | HEART RATE: 71 BPM | WEIGHT: 315 LBS

## 2021-05-20 DIAGNOSIS — M77.51 CAPSULITIS OF METATARSOPHALANGEAL (MTP) JOINT OF RIGHT FOOT: Primary | ICD-10-CM

## 2021-05-20 PROCEDURE — 73630 X-RAY EXAM OF FOOT: CPT | Mod: RT | Performed by: RADIOLOGY

## 2021-05-20 PROCEDURE — 99213 OFFICE O/P EST LOW 20 MIN: CPT | Performed by: PODIATRIST

## 2021-05-20 ASSESSMENT — MIFFLIN-ST. JEOR: SCORE: 2473.37

## 2021-05-20 ASSESSMENT — PAIN SCALES - GENERAL: PAINLEVEL: WORST PAIN (10)

## 2021-05-20 NOTE — TELEPHONE ENCOUNTER
Returned call to pt and he is scheduled with dr Nassar later today.    Aurora SCOTT RN Specialty Triage 5/20/2021 11:33 AM

## 2021-05-20 NOTE — LETTER
5/20/2021         RE: Joel Pineda  [address] [facility] [id number]        Dear Colleague,    Thank you for referring your patient, Joel Pineda, to the [facility]. Please see a copy of my visit note below.    PATIENT HISTORY:  Joel Pineda is a 45 year old male who presents to clinic with a chief complaint of a painful forefoot on the right.  The patient relates that he previously was seen by Dr. Perez several months ago and diagnosed with capsulitis on the right foot.    The patient relates the pain is primarily located around the ball of the right foot.  The patient relates that the problem has been going on for 7 months and is getting worse.  The patient relates trying metatarsal pads and a Dynaflex insert with little relief.  The patient is currently employed but on work disability.   Any previous notes and studies that pertain to the patient's condition were reviewed.    Pertinent medical, surgical and family history was reviewed in Epic chart and include chronic pain syndrome, dysautonomia, peripheral polyneuropathy, vitamin B12 deficiency, hypothyroidism, type II Diabetes Mellitus, Ankylosing spondylitis, degenerative lumbar disc disease    Medications:   Current Outpatient Medications:      acetaminophen 500 MG CAPS, Take 1,000 mg by mouth 2 times daily , Disp: 60 capsule, Rfl:      albuterol (PROAIR HFA/PROVENTIL HFA/VENTOLIN HFA) 108 (90 Base) MCG/ACT inhaler, Inhale 2 puffs into the lungs every 4 hours as needed for shortness of breath / dyspnea or wheezing, Disp: 8.5 g, Rfl: 11     aspirin (ASA) 81 MG tablet, Take 81 mg by mouth daily , Disp: , Rfl:      baclofen (LIORESAL) 10 MG tablet, Take 0.5-1 tablets (5-10 mg) by mouth 2 times daily, Disp: 60 tablet, Rfl: 1     cetirizine (ZYRTEC) 10 MG tablet, Take 1 tablet (10 mg) by mouth At Bedtime, Disp: 30 tablet, Rfl: 11     cholecalciferol (D3-50) 1250 mcg (98575 units) capsule, TAKE 1 CAPSULE BY  MOUTH EVERY 2 WEEKS, Disp: 24 capsule, Rfl: 0     clonazePAM (KLONOPIN) 0.5 MG tablet, Take 0.5 mg by mouth At Bedtime , Disp: , Rfl:      cyanocobalamin (CYANOCOBALAMIN) 1000 MCG/ML injection, Inject 1 mL (1,000 mcg) into the muscle every 30 days, Disp: 10 mL, Rfl: 0     DULoxetine (CYMBALTA) 60 MG capsule, Take 60 mg by mouth 2 times daily , Disp: , Rfl:      EPINEPHrine (ANY BX GENERIC EQUIV) 0.3 MG/0.3ML injection 2-pack, Inject 0.3 mLs (0.3 mg) into the muscle once as needed for anaphylaxis, Disp: 0.6 mL, Rfl: 3     etanercept (ENBREL SURECLICK) 50 MG/ML autoinjector, Inject 50 mg Subcutaneous once a week . Hold for signs of infection, and seek medical attention., Disp: 4 mL, Rfl: 12     famotidine (PEPCID) 20 MG tablet, Prior to administration of Humira every 2 weeks (Patient taking differently: Take 20 mg by mouth every 7 days Prior to Enbrel Injections to prevent skin reaction), Disp: , Rfl:      fluticasone (FLONASE) 50 MCG/ACT nasal spray, Spray 1 spray into both nostrils daily, Disp: , Rfl:      fluticasone-salmeterol (ADVAIR) 500-50 MCG/DOSE inhaler, Inhale 1 puff into the lungs every 12 hours, Disp: 3 each, Rfl: 3     glipiZIDE (GLUCOTROL XL) 2.5 MG 24 hr tablet, Take 1 tablet (2.5 mg) by mouth daily, Disp: 90 tablet, Rfl: 0     lamoTRIgine (LAMICTAL) 100 MG tablet, Take 200 mg by mouth daily, Disp: , Rfl:      levothyroxine (SYNTHROID/LEVOTHROID) 75 MCG tablet, TAKE 1 TABLET EVERY MORNING, Disp: 90 tablet, Rfl: 3     lidocaine (XYLOCAINE) 5 % external ointment, Apply topically 4 times daily as needed (pain), Disp: 50 g, Rfl: 2     melatonin 3 MG tablet, Take 9 mg by mouth nightly as needed , Disp: , Rfl:      metoclopramide (REGLAN) 5 MG tablet, Take 5 mg by mouth 2 times daily, Disp: , Rfl:      metoprolol succinate ER (TOPROL-XL) 200 MG 24 hr tablet, Take 1 tablet (200 mg) by mouth 2 times daily, Disp: 180 tablet, Rfl: 1     montelukast (SINGULAIR) 10 MG tablet, Take 1 tablet (10 mg) by mouth every  evening, Disp: 90 tablet, Rfl: 3     nabumetone (RELAFEN) 500 MG tablet, TAKE 1-2 TABLETS BY MOUTH TWICE DAILY WITH MEALS AS NEEDED FOR BACK/JOINT PAIN, Disp: 60 tablet, Rfl: 2     naloxone (NARCAN) 4 MG/0.1ML nasal spray, Spray 1 spray (4 mg) into one nostril alternating nostrils as needed for opioid reversal every 2-3 minutes until assistance arrives, Disp: 0.2 mL, Rfl: 0     olopatadine (PATADAY) 0.2 % ophthalmic solution, Place 1 drop into both eyes daily, Disp: 2.5 mL, Rfl: 3     omega-3 acid ethyl esters (LOVAZA) 1 g capsule, TAKE 2 CAPSULES BY MOUTH TWICE DAILY , Disp: 360 capsule, Rfl: 1     omeprazole 20 MG tablet, Take 20 mg by mouth daily , Disp: , Rfl:      order for DME, Equipment being ordered: Assure Compression stockings (ANNETTA) Large, closed toe, thigh-high 30-40 compression, Disp: 2 Units, Rfl: 3     order for DME, Equipment being ordered: lumbosacral belt/brace, Disp: 1 Units, Rfl: 0     order for DME, Respironics REMSTAR 60 Series Auto CPAP 9-13 cm H2O, Wisp nasal mask w/a large cushion and a chinstrap, Disp: , Rfl:      oxyCODONE (ROXICODONE) 5 MG tablet, Take 1-2 tablets (5-10 mg) by mouth every 6 hours as needed for pain (maximum 4 tablet(s) per day), Disp: 35 tablet, Rfl: 0     polyethylene glycol (MIRALAX) 17 g packet, Take 1 packet by mouth daily, Disp: , Rfl:      pregabalin (LYRICA) 150 MG capsule, Take 1 capsule (150 mg) by mouth 3 times daily, Disp: 90 capsule, Rfl: 5     QUEtiapine (SEROQUEL) 100 MG tablet, Take 100 mg by mouth At Bedtime, Disp: , Rfl:      ramipril (ALTACE) 10 MG capsule, Take 1 capsule (10 mg) by mouth daily, Disp: 90 capsule, Rfl: 1     rizatriptan (MAXALT-MLT) 5 MG ODT, DISSOLVE 1 TABLET BY MOUTH AT ONSET OF HEADACHE, Disp: 30 tablet, Rfl: 0     rosuvastatin (CRESTOR) 40 MG tablet, Take 1 tablet (40 mg) by mouth daily, Disp: 90 tablet, Rfl: 2     syringe, disposable, (BD TUBERCULIN SYRINGE) 1 ML MISC, Equipment being ordered: 1 ml tuberculin syringes to be used for  "Vitamin B12 injections., Disp: 12 each, Rfl: 11     vitamin B complex with vitamin C (STRESS TAB) tablet, Take 1 tablet by mouth daily, Disp: , Rfl:      ZINC SULFATE-VITAMIN C MT, Take 1 tablet by mouth daily, Disp: , Rfl:      Allergies:    Allergies   Allergen Reactions     Amoxicillin-Pot Clavulanate Difficulty breathing     Banana Shortness Of Breath     Pt reports organic Banana is okay.      Nitroglycerin Palpitations     Penicillins Anaphylaxis     Provigil [Modafinil] Shortness Of Breath     headache     Gadolinium Hives and Itching     Patient was premedicated for the contrast allergy. He did still have a reaction a few hours after injection. Hives and itching. Dr. Gomez told tech to inform pt he should only have contrast again in the future when premedicated and at a hospital. Not at an outpatient facility.      Ketoconazole      Topical cream caused swelling and itching     Dye [Contrast Dye] Other (See Comments) and Hives     Moderate flushing, CT contrast     Golimumab      Hives, bradycardia, face swelling     Neurontin [Gabapentin] Hives     Moderate hives     Nortriptyline Hives     Varicella Virus Vaccine Live      Rash     Flagyl [Metronidazole Hcl] Palpitations and Hives     Latex Rash     Metronidazole Palpitations, Other (See Comments) and Rash     dizziness (versus ciprofloxacin taken at same time)       Vitals: /76   Pulse 71   Ht 1.981 m (6' 6\")   Wt 146.5 kg (323 lb)   BMI 37.33 kg/m    BMI= Body mass index is 37.33 kg/m .    LOWER EXTREMITY PHYSICAL EXAM    Dermatologic: Skin is intact to right lower extremities without significant lesions, rash or abrasion.        Vascular: DP & PT pulses are intact & regular on the right.   CFT and skin temperature is normal to the right lower extremities.     Neurologic: Lower extremity sensation is intact to light touch.  No evidence of weakness in the right lower extremities.        Musculoskeletal: Patient is ambulatory without " assistive device or brace.  No gross ankle deformity noted.  No foot or ankle joint effusion is noted.  Noted pain on palpation under the second metatarsophalangeal joint on the right.  Noted positive Lachman's test to the second metatarsophalangeal joint on the right. No surrounding erythema or ecchymosis noted.    Diagnostics:  Radiographs included three views of the right foot demonstrating   no cortical erosions or periosteal elevation.  All joint margins appear stable.  There is no apparent fracture or tumor formation noted.  There is no evidence of foreign body.  The images were independently reviewed by myself along with the patient explaining the findings.      ASSESSMENT / PLAN:     ICD-10-CM    1. Capsulitis of metatarsophalangeal (MTP) joint of right foot  M77.51 XR Foot Right G/E 3 Views     Ankle/Foot Bracing Supplies DME       I have explained to Joel about the conditions.  We discussed the underlying contributing factors of the condition as well as both conservative and surgical treatment options along with expected length of recovery.  At this time, the patient was educated on the importance of offloading supportive shoes and other devices.  I demonstrated to the patient calf stretches to perform every hour daily until symptoms resolve.  After symptoms resolve, the patient was advised to perform the stretches 3 times daily to prevent future recurrence.  The patient was instructed to perform warm soaks with Epson salt after which to also apply over-the-counter Voltaren gel to deeply massage the injured tissue.  The patient was instructed to do this on a daily basis until symptoms resolve.  The patient may also take over-the-counter NSAID medication, if tolerated, to help further reduce the inflammation tissue.   The patient was advised to take this type of medication with food to prevent stomach irritation and to stop taking the medication if stomach irritation occurs.  The patient was fitted with  a Dynaflex insert that will aid in offloading the tension forces to the soft tissues and prevent further inflammation.   The patient will return in four weeks for reevaluation if the symptoms do not resolve.        Joel verbalized agreement with and understanding of the rational for the diagnosis and treatment plan.  All questions were answered to best of my ability and the patient's satisfaction. The patient was advised to contact the clinic with any questions that may arise after the clinic visit.      Disclaimer: This note consists of symbols derived from keyboarding, dictation and/or voice recognition software. As a result, there may be errors in the script that have gone undetected. Please consider this when interpreting information found in this chart.       KELTON Carballo.P.M., F.A.C.F.A.S.        Again, thank you for allowing me to participate in the care of your patient.        Sincerely,        Raza Nassar DPM

## 2021-05-20 NOTE — TELEPHONE ENCOUNTER
Reason for call:  Same Day Appointment   Requested Provider: Chris      PCP: charity    Reason for visit: looking for possible inj in right foot like last time, having the same issue./ capsulitis inflamation    Duration of symptoms: a week    Have you been treated for this in the past? Yes    Additional comments: looking to get in today if possible       Phone number to reach patient:  Home number on file [phone] (home)    Best Time:  any    Can we leave a detailed message on this number?  YES    Travel screening: Not Applicable

## 2021-05-20 NOTE — NURSING NOTE
"Chief Complaint   Patient presents with     Consult     capsulitis of right foot       Initial /76   Pulse 71   Ht 1.981 m (6' 6\")   Wt 146.5 kg (323 lb)   BMI 37.33 kg/m   Estimated body mass index is 37.33 kg/m  as calculated from the following:    Height as of this encounter: 1.981 m (6' 6\").    Weight as of this encounter: 146.5 kg (323 lb).  Medications and allergies reviewed.      Rosangela MELARA MA    "

## 2021-05-20 NOTE — PROGRESS NOTES
PATIENT HISTORY:  Joel Pineda is a 45 year old male who presents to clinic with a chief complaint of a painful forefoot on the right.  The patient relates that he previously was seen by Dr. Perez several months ago and diagnosed with capsulitis on the right foot.    The patient relates the pain is primarily located around the ball of the right foot.  The patient relates that the problem has been going on for 7 months and is getting worse.  The patient relates trying metatarsal pads and a Dynaflex insert with little relief.  The patient is currently employed but on work disability.   Any previous notes and studies that pertain to the patient's condition were reviewed.    Pertinent medical, surgical and family history was reviewed in Epic chart and include chronic pain syndrome, dysautonomia, peripheral polyneuropathy, vitamin B12 deficiency, hypothyroidism, type II Diabetes Mellitus, Ankylosing spondylitis, degenerative lumbar disc disease    Medications:   Current Outpatient Medications:      acetaminophen 500 MG CAPS, Take 1,000 mg by mouth 2 times daily , Disp: 60 capsule, Rfl:      albuterol (PROAIR HFA/PROVENTIL HFA/VENTOLIN HFA) 108 (90 Base) MCG/ACT inhaler, Inhale 2 puffs into the lungs every 4 hours as needed for shortness of breath / dyspnea or wheezing, Disp: 8.5 g, Rfl: 11     aspirin (ASA) 81 MG tablet, Take 81 mg by mouth daily , Disp: , Rfl:      baclofen (LIORESAL) 10 MG tablet, Take 0.5-1 tablets (5-10 mg) by mouth 2 times daily, Disp: 60 tablet, Rfl: 1     cetirizine (ZYRTEC) 10 MG tablet, Take 1 tablet (10 mg) by mouth At Bedtime, Disp: 30 tablet, Rfl: 11     cholecalciferol (D3-50) 1250 mcg (87677 units) capsule, TAKE 1 CAPSULE BY MOUTH EVERY 2 WEEKS, Disp: 24 capsule, Rfl: 0     clonazePAM (KLONOPIN) 0.5 MG tablet, Take 0.5 mg by mouth At Bedtime , Disp: , Rfl:      cyanocobalamin (CYANOCOBALAMIN) 1000 MCG/ML injection, Inject 1 mL (1,000 mcg) into the muscle every 30 days, Disp: 10 mL, Rfl:  0     DULoxetine (CYMBALTA) 60 MG capsule, Take 60 mg by mouth 2 times daily , Disp: , Rfl:      EPINEPHrine (ANY BX GENERIC EQUIV) 0.3 MG/0.3ML injection 2-pack, Inject 0.3 mLs (0.3 mg) into the muscle once as needed for anaphylaxis, Disp: 0.6 mL, Rfl: 3     etanercept (ENBREL SURECLICK) 50 MG/ML autoinjector, Inject 50 mg Subcutaneous once a week . Hold for signs of infection, and seek medical attention., Disp: 4 mL, Rfl: 12     famotidine (PEPCID) 20 MG tablet, Prior to administration of Humira every 2 weeks (Patient taking differently: Take 20 mg by mouth every 7 days Prior to Enbrel Injections to prevent skin reaction), Disp: , Rfl:      fluticasone (FLONASE) 50 MCG/ACT nasal spray, Spray 1 spray into both nostrils daily, Disp: , Rfl:      fluticasone-salmeterol (ADVAIR) 500-50 MCG/DOSE inhaler, Inhale 1 puff into the lungs every 12 hours, Disp: 3 each, Rfl: 3     glipiZIDE (GLUCOTROL XL) 2.5 MG 24 hr tablet, Take 1 tablet (2.5 mg) by mouth daily, Disp: 90 tablet, Rfl: 0     lamoTRIgine (LAMICTAL) 100 MG tablet, Take 200 mg by mouth daily, Disp: , Rfl:      levothyroxine (SYNTHROID/LEVOTHROID) 75 MCG tablet, TAKE 1 TABLET EVERY MORNING, Disp: 90 tablet, Rfl: 3     lidocaine (XYLOCAINE) 5 % external ointment, Apply topically 4 times daily as needed (pain), Disp: 50 g, Rfl: 2     melatonin 3 MG tablet, Take 9 mg by mouth nightly as needed , Disp: , Rfl:      metoclopramide (REGLAN) 5 MG tablet, Take 5 mg by mouth 2 times daily, Disp: , Rfl:      metoprolol succinate ER (TOPROL-XL) 200 MG 24 hr tablet, Take 1 tablet (200 mg) by mouth 2 times daily, Disp: 180 tablet, Rfl: 1     montelukast (SINGULAIR) 10 MG tablet, Take 1 tablet (10 mg) by mouth every evening, Disp: 90 tablet, Rfl: 3     nabumetone (RELAFEN) 500 MG tablet, TAKE 1-2 TABLETS BY MOUTH TWICE DAILY WITH MEALS AS NEEDED FOR BACK/JOINT PAIN, Disp: 60 tablet, Rfl: 2     naloxone (NARCAN) 4 MG/0.1ML nasal spray, Spray 1 spray (4 mg) into one nostril  alternating nostrils as needed for opioid reversal every 2-3 minutes until assistance arrives, Disp: 0.2 mL, Rfl: 0     olopatadine (PATADAY) 0.2 % ophthalmic solution, Place 1 drop into both eyes daily, Disp: 2.5 mL, Rfl: 3     omega-3 acid ethyl esters (LOVAZA) 1 g capsule, TAKE 2 CAPSULES BY MOUTH TWICE DAILY , Disp: 360 capsule, Rfl: 1     omeprazole 20 MG tablet, Take 20 mg by mouth daily , Disp: , Rfl:      order for DME, Equipment being ordered: Assure Compression stockings (ANNETTA) Large, closed toe, thigh-high 30-40 compression, Disp: 2 Units, Rfl: 3     order for DME, Equipment being ordered: lumbosacral belt/brace, Disp: 1 Units, Rfl: 0     order for DME, Respironics REMSTAR 60 Series Auto CPAP 9-13 cm H2O, Wisp nasal mask w/a large cushion and a chinstrap, Disp: , Rfl:      oxyCODONE (ROXICODONE) 5 MG tablet, Take 1-2 tablets (5-10 mg) by mouth every 6 hours as needed for pain (maximum 4 tablet(s) per day), Disp: 35 tablet, Rfl: 0     polyethylene glycol (MIRALAX) 17 g packet, Take 1 packet by mouth daily, Disp: , Rfl:      pregabalin (LYRICA) 150 MG capsule, Take 1 capsule (150 mg) by mouth 3 times daily, Disp: 90 capsule, Rfl: 5     QUEtiapine (SEROQUEL) 100 MG tablet, Take 100 mg by mouth At Bedtime, Disp: , Rfl:      ramipril (ALTACE) 10 MG capsule, Take 1 capsule (10 mg) by mouth daily, Disp: 90 capsule, Rfl: 1     rizatriptan (MAXALT-MLT) 5 MG ODT, DISSOLVE 1 TABLET BY MOUTH AT ONSET OF HEADACHE, Disp: 30 tablet, Rfl: 0     rosuvastatin (CRESTOR) 40 MG tablet, Take 1 tablet (40 mg) by mouth daily, Disp: 90 tablet, Rfl: 2     syringe, disposable, (BD TUBERCULIN SYRINGE) 1 ML MISC, Equipment being ordered: 1 ml tuberculin syringes to be used for Vitamin B12 injections., Disp: 12 each, Rfl: 11     vitamin B complex with vitamin C (STRESS TAB) tablet, Take 1 tablet by mouth daily, Disp: , Rfl:      ZINC SULFATE-VITAMIN C MT, Take 1 tablet by mouth daily, Disp: , Rfl:      Allergies:    Allergies  "  Allergen Reactions     Amoxicillin-Pot Clavulanate Difficulty breathing     Banana Shortness Of Breath     Pt reports organic Banana is okay.      Nitroglycerin Palpitations     Penicillins Anaphylaxis     Provigil [Modafinil] Shortness Of Breath     headache     Gadolinium Hives and Itching     Patient was premedicated for the contrast allergy. He did still have a reaction a few hours after injection. Hives and itching. Dr. Gomez told tech to inform pt he should only have contrast again in the future when premedicated and at a hospital. Not at an outpatient facility.      Ketoconazole      Topical cream caused swelling and itching     Dye [Contrast Dye] Other (See Comments) and Hives     Moderate flushing, CT contrast     Golimumab      Hives, bradycardia, face swelling     Neurontin [Gabapentin] Hives     Moderate hives     Nortriptyline Hives     Varicella Virus Vaccine Live      Rash     Flagyl [Metronidazole Hcl] Palpitations and Hives     Latex Rash     Metronidazole Palpitations, Other (See Comments) and Rash     dizziness (versus ciprofloxacin taken at same time)       Vitals: /76   Pulse 71   Ht 1.981 m (6' 6\")   Wt 146.5 kg (323 lb)   BMI 37.33 kg/m    BMI= Body mass index is 37.33 kg/m .    LOWER EXTREMITY PHYSICAL EXAM    Dermatologic: Skin is intact to right lower extremities without significant lesions, rash or abrasion.        Vascular: DP & PT pulses are intact & regular on the right.   CFT and skin temperature is normal to the right lower extremities.     Neurologic: Lower extremity sensation is intact to light touch.  No evidence of weakness in the right lower extremities.        Musculoskeletal: Patient is ambulatory without assistive device or brace.  No gross ankle deformity noted.  No foot or ankle joint effusion is noted.  Noted pain on palpation under the second metatarsophalangeal joint on the right.  Noted positive Lachman's test to the second metatarsophalangeal joint on " the right. No surrounding erythema or ecchymosis noted.    Diagnostics:  Radiographs included three views of the right foot demonstrating   no cortical erosions or periosteal elevation.  All joint margins appear stable.  There is no apparent fracture or tumor formation noted.  There is no evidence of foreign body.  The images were independently reviewed by myself along with the patient explaining the findings.      ASSESSMENT / PLAN:     ICD-10-CM    1. Capsulitis of metatarsophalangeal (MTP) joint of right foot  M77.51 XR Foot Right G/E 3 Views     Ankle/Foot Bracing Supplies DME       I have explained to Joel about the conditions.  We discussed the underlying contributing factors of the condition as well as both conservative and surgical treatment options along with expected length of recovery.  At this time, the patient was educated on the importance of offloading supportive shoes and other devices.  I demonstrated to the patient calf stretches to perform every hour daily until symptoms resolve.  After symptoms resolve, the patient was advised to perform the stretches 3 times daily to prevent future recurrence.  The patient was instructed to perform warm soaks with Epson salt after which to also apply over-the-counter Voltaren gel to deeply massage the injured tissue.  The patient was instructed to do this on a daily basis until symptoms resolve.  The patient may also take over-the-counter NSAID medication, if tolerated, to help further reduce the inflammation tissue.   The patient was advised to take this type of medication with food to prevent stomach irritation and to stop taking the medication if stomach irritation occurs.  The patient was fitted with a Dynaflex insert that will aid in offloading the tension forces to the soft tissues and prevent further inflammation.   The patient will return in four weeks for reevaluation if the symptoms do not resolve.        Joel verbalized agreement with and  understanding of the rational for the diagnosis and treatment plan.  All questions were answered to best of my ability and the patient's satisfaction. The patient was advised to contact the clinic with any questions that may arise after the clinic visit.      Disclaimer: This note consists of symbols derived from keyboarding, dictation and/or voice recognition software. As a result, there may be errors in the script that have gone undetected. Please consider this when interpreting information found in this chart.       RYLIE Nassar D.P.M., F.A.C.F.A.S.

## 2021-05-20 NOTE — PATIENT INSTRUCTIONS
Next Steps:      1. Support:  a. Wear supportive shoes, sandals, boots and/or inserts that have a rigid supportive sole.    i. This will offload the majority of tension forces that travel through your feet every step you take.    1. SkCommonTime Max Cushioning Elite/Premier   2. Skechers Relax Fit D'Lux Walker  3. Superfeet inserts (www.superfeet.com)  b. It is important that you also wear supportive shoe wear in the house to continue providing support to your feet.    c. You may always use a cushioned liner for your shoes if that makes your feet feel better.  2. Stretching  a. Calf stretching is essential to offload the tension forces that travel through your feet every step you take  b. Preferred calf stretch is the Runner's Stretch  i. Place one foot behind the other foot, flat against the ground (it is important to keep the heel on the ground).  The back leg is the one that will be stretched.  1. Start with the knee straight and lean your hips into the wall, counter or whatever you are leaning into - count to ten.  2. Next, bend the knee.  You should feel the stretch lower in the calf muscle - count to ten.  c. Repeat this stretch once an hour to start off with.  When symptoms subside, I recommend performing the stretch 3 times daily to prevent any future problems.                3. Tissue Massage  a. It is important that you physically loosen the inflammation tissue to help your body heal the injured tissue.  b. I recommend soaking your foot in warm water to increase the microcirculation to the soft tissues.  You may add Epson salt to the water if you prefer.  c. You may apply an over-the-counter muscle rub, such as Voltaren gel, and deeply massage the injured tissue.  4. Reduce Inflammation  a. You can ice the injured tissue with an ice pack with a light cloth covering or soaking in ice water 20 minutes to reduce any acute inflammation, typically at the end of the day.  b. If tolerated, you may take a  Non-Steroidal Antiinflammatory medication (NSAID), such as Advil or Aleve, to help reduce the inflammation tissue.  This can help the overall healing of the injured tissue.  i. It is important to take food with any NSAID medication as the most common side effect is stomach irritation.  If you encounter any problems when taking NSAID, it is recommended that you stop taking the medication and notify your provider.    It is important to understand that most problems that develop in the foot and ankle are caused by excessive tension that cause microinjury to the soft tissues and inflammation in the foot and ankle.  By addressing the underlying causes with support and stretching as well as treating the current inflammatory conditions with tissue massage and anti-inflammatory treatments, most foot and ankle musculoskeletal conditions will resolve.  This may take time to heal.  However, if symptoms persist past 4 weeks you should return to the office for reevaluation to determine further treatment options.

## 2021-05-23 DIAGNOSIS — Z20.822 ENCOUNTER FOR LABORATORY TESTING FOR COVID-19 VIRUS: ICD-10-CM

## 2021-05-23 LAB
SARS-COV-2 RNA RESP QL NAA+PROBE: NORMAL
SPECIMEN SOURCE: NORMAL

## 2021-05-23 PROCEDURE — U0003 INFECTIOUS AGENT DETECTION BY NUCLEIC ACID (DNA OR RNA); SEVERE ACUTE RESPIRATORY SYNDROME CORONAVIRUS 2 (SARS-COV-2) (CORONAVIRUS DISEASE [COVID-19]), AMPLIFIED PROBE TECHNIQUE, MAKING USE OF HIGH THROUGHPUT TECHNOLOGIES AS DESCRIBED BY CMS-2020-01-R: HCPCS | Performed by: PSYCHIATRY & NEUROLOGY

## 2021-05-23 PROCEDURE — U0005 INFEC AGEN DETEC AMPLI PROBE: HCPCS | Performed by: PSYCHIATRY & NEUROLOGY

## 2021-05-24 LAB
LABORATORY COMMENT REPORT: NORMAL
SARS-COV-2 RNA RESP QL NAA+PROBE: NEGATIVE
SPECIMEN SOURCE: NORMAL

## 2021-05-25 NOTE — PROGRESS NOTES
Pre procedure Diagnosis: facet arthropathy   Post procedure Diagnosis: Same  Procedure performed: bilateral L3, 4 and 5 lumbar radiofrequency ablation   Anesthesia: moderate sedation with Midazolam IV 2mg, Fentanyl IV 100mcg.   Complications: none  Operators: Ericka Diaz MD     Indications:   Joel Pineda is a 45 year old male with a history of chronic low back pain secondary to facet arthropathy and SI joint dysfunction s/o left SI joint fusion in the past.     Previous radiofrequency ablation was helpful, done 10/26/16- gave 80% relief for 8 months.      Options/alternatives, benefits and risks were discussed with the patient including bleeding, infection, no pain relief, tissue trauma, exposure to radiation, reaction to medications including seizure, spinal cord injury,increased pain after the procedure, weakness, numbness or sensory changes and headache.   We also discussed risks of sedation, including reaction to medications and cardiovascular collapse.    Questions were answered to his satisfaction and he agrees to proceed. Voluntary informed consent was obtained and signed.     Vitals were reviewed: Yes  Allergies were reviewed:  Yes   Medications were reviewed:  Yes   Pre-procedure pain score: 4/10    Procedure:  After getting informed consent, patient was brought into the procedure suite and was placed in a prone position on the procedure table.   A Pause for the Cause was performed.  Patient was prepped and draped in sterile fashion.     Joel Pineda had an IV line placed prior the procedure.  The C-arm was positioned in the right oblique view to afford optimal view of the L4-L5 vertebral bodies. Lidocaine 1% was used to anesthetize the skin at each level. At each level, a 150 cm, 20G curved radiofrequency cannula with a 10mm active tip was positioned, overlying the intersection of the transverse process and pedicle at L4 & L5, and was advanced under intermittent fluoroscopy until it contacted the  transverse process and notch, and the tip slightly overran that process, just lateral to the mamillary process. The position of each cannula was verified and optimized in the oblique view and AP views.   In the AP view, another cannula was placed at the sacral alar notch.      Each position was tested for motor and sensory stimulation, and was positioned so that stimulation was negative for stimuli outside the immediate area of the desired lesion. Sensory stimulation was completed at 50 Hz, with max stimulation up to 0.8V. Motor stimulation was completed at 2Hz, up to 2.5V.  Bupivacaine 0.5% mixed 1:1 with lidocaine 1% 1.2ml was injected, and a 90 second, 80 degree Centigrade lesion was generated.    The needles were then rotated within the pathway of the medial branch, and locations were evaluated with repeat imaging. Motor testing was again completed, and showed appropriate stimulation. A second lesion was then generated at each location.    The procedure was then repeated on the left side, using the same technique as described above.    The needles were flushed with lidocaine as they were withdrawn. Hemostasis was achieved.      Hemostasis was achieved, the area was cleaned, and bandaids were placed when appropriate.  The patient tolerated the procedure well, and was taken to the recovery room.    Images were saved to PACS.    Sedation start time:  1046  Sedation end time:  1139      Post-procedure pain score: 2/10  Follow-up includes:   -f/u phone call in one week  -post-procedure pain medications: oxycodone 5-10mg q6h prn, max of 6/day      Ericka Diaz MD  [facility]      [facility] Procedure Note        Sedation:      Performed by: Elaine Diaz  Authorized by: Elaine Diaz    Pre-Procedure Assessment done at 1030    Expected Level:  Moderate Sedation    Indication:  Sedation is required to allow for RFA    Consent obtained from patient after discussing the risks,  benefits and alternatives.    PO Intake:  Appropriately NPO for procedure    ASA Class:  Class 2 - MILD SYSTEMIC DISEASE, NO ACUTE PROBLEMS, NO FUNCTIONAL LIMITATIONS.    Mallampati:  Grade 2:  Soft palate, base of uvula, tonsillar pillars, and portion of posterior pharyngeal wall visible    Lungs: Lungs Clear with good breath sounds bilaterally.     Heart: Normal heart sounds and rate    History and physical reviewed and no updates needed. I have reviewed the lab findings, diagnostic data, medications, and the plan for sedation. I have determined this patient to be an appropriate candidate for the planned sedation and procedure and have reassessed the patient IMMEDIATELY PRIOR to sedation and procedure.      Sedation Post Procedure Summary:    Prior to the start of the procedure and with procedural staff participation, I verbally confirmed the patient s identity using two indicators, relevant allergies, that the procedure was appropriate and matched the consent or emergent situation, and that the correct equipment/implants were available. Immediately prior to starting the procedure I conducted the Time Out with the procedural staff and re-confirmed the patient s name, procedure, and site/side. (The Joint Commission universal protocol was followed.)  Yes      Sedatives: Fentanyl and Midazolam (Versed)    Vital signs, airway, and pulse oximetry were monitored and remained stable throughout the procedure and sedation was maintained until the procedure was complete.  The patient was monitored by staff until sedation discharge criteria were met.    Patient tolerance: Patient tolerated the procedure well with no immediate complications.    Time of sedation in minutes: 53 minutes from beginning to end of physician one to one monitoring.

## 2021-05-26 ENCOUNTER — RADIOLOGY INJECTION OFFICE VISIT (OUTPATIENT)
Dept: PALLIATIVE MEDICINE | Facility: CLINIC | Age: 46
End: 2021-05-26
Attending: PSYCHIATRY & NEUROLOGY
Payer: MEDICARE

## 2021-05-26 VITALS
RESPIRATION RATE: 20 BRPM | SYSTOLIC BLOOD PRESSURE: 121 MMHG | OXYGEN SATURATION: 95 % | HEART RATE: 66 BPM | DIASTOLIC BLOOD PRESSURE: 64 MMHG

## 2021-05-26 DIAGNOSIS — M45.8 ANKYLOSING SPONDYLITIS OF SACRAL REGION (H): ICD-10-CM

## 2021-05-26 DIAGNOSIS — M51.369 DDD (DEGENERATIVE DISC DISEASE), LUMBAR: ICD-10-CM

## 2021-05-26 DIAGNOSIS — G89.18 PAIN ASSOCIATED WITH SURGICAL PROCEDURE: Primary | ICD-10-CM

## 2021-05-26 DIAGNOSIS — M47.816 LUMBAR FACET ARTHROPATHY: ICD-10-CM

## 2021-05-26 PROCEDURE — 64635 DESTROY LUMB/SAC FACET JNT: CPT | Mod: 50 | Performed by: PSYCHIATRY & NEUROLOGY

## 2021-05-26 PROCEDURE — 99152 MOD SED SAME PHYS/QHP 5/>YRS: CPT | Performed by: PSYCHIATRY & NEUROLOGY

## 2021-05-26 PROCEDURE — 64636 DESTROY L/S FACET JNT ADDL: CPT | Mod: 50 | Performed by: PSYCHIATRY & NEUROLOGY

## 2021-05-26 PROCEDURE — 99153 MOD SED SAME PHYS/QHP EA: CPT | Performed by: PSYCHIATRY & NEUROLOGY

## 2021-05-26 RX ORDER — OXYCODONE HYDROCHLORIDE 5 MG/1
5-10 TABLET ORAL EVERY 6 HOURS PRN
Qty: 42 TABLET | Refills: 0 | Status: SHIPPED | OUTPATIENT
Start: 2021-05-26 | End: 2021-06-01

## 2021-05-26 RX ORDER — FENTANYL CITRATE 50 UG/ML
12.5-25 INJECTION, SOLUTION INTRAMUSCULAR; INTRAVENOUS EVERY 5 MIN PRN
Status: DISCONTINUED | OUTPATIENT
Start: 2021-05-26 | End: 2021-08-18

## 2021-05-26 RX ADMIN — FENTANYL CITRATE 12.5 MCG: 50 INJECTION, SOLUTION INTRAMUSCULAR; INTRAVENOUS at 11:00

## 2021-05-26 RX ADMIN — FENTANYL CITRATE 25 MCG: 50 INJECTION, SOLUTION INTRAMUSCULAR; INTRAVENOUS at 10:47

## 2021-05-26 RX ADMIN — FENTANYL CITRATE 25 MCG: 50 INJECTION, SOLUTION INTRAMUSCULAR; INTRAVENOUS at 11:11

## 2021-05-26 RX ADMIN — FENTANYL CITRATE 25 MCG: 50 INJECTION, SOLUTION INTRAMUSCULAR; INTRAVENOUS at 10:51

## 2021-05-26 ASSESSMENT — PAIN SCALES - GENERAL: PAINLEVEL: MODERATE PAIN (4)

## 2021-05-26 NOTE — NURSING NOTE
Discharge Information    IV Discontiued Time:  1150 Catheter intact     Discharge Criteria = When patient returns to baseline or as per MD order    Consciousness:  Pt is fully awake    Circulation:  BP +/- 20% of pre-procedure level    Respiration:  Patient is able to breathe deeply    O2 Sat:  Patient is able to maintain O2 Sat >92% on room air    Activity:  Moves 4 extremities on command    Ambulation:  Patient is able to stand and walk or stand and pivot into wheelchair    Dressing:  Clean/dry or No Dressing    Notes:   Discharge instructions and AVS given to patient    Patient meets criteria for discharge?  YES    Admitted to PCU?  No    Responsible adult present to accompany patient home?  Yes    Signature/Title:    mitch cummings RN  RN Care Coordinator  [facility] [place]

## 2021-05-26 NOTE — NURSING NOTE
24g auge Peripheral IV inserted into right hand - attempts: 1      MD Time IN: 1043   Sedation start time:  1046  Sedation end time:  1139    Medications given: fentanyl 100 mcg IV; versed 2 mg IV; toradol 0 mg IV  Intravenous fluids were administered, normal saline 250 cc's.  Sedation Level Achieved:  Moderate (conscious) sedation    Eloisa RN-BSN  [facility]

## 2021-05-26 NOTE — PATIENT INSTRUCTIONS
[facility]   Radiofrequency Ablation (RFA) Discharge Instructions     Today you saw:  Dr. Elaine Diaz     You should anticipate procedural pain for up to 2 weeks.     You may receive a prescription for pain medication and you should take this as directed.    -you can increase your oxycodone to 1-2 tabs every 6 hours as needed, max of 6/day.  I will provide a one week script.  If after 1 week you are ready to go back to your normal dosing, call Dr. Lyles to refill. If you feel you still need an increased dose for post-radiofrequency ablation pain, then please let me know.   -make sure to wear CPAP, even with naps    It may take up to 8 weeks to receive relief from the RFA     Update us after 8 weeks if any concerns    If you received sedation before, during or after your procedure, for the next 24 hours you shall NOT:    -Drive    -Operate machinery    -Drink alcohol    -Sign any legal documents     You may resume your normal diet     You may resume your regular medications after the procedure     If you were holding your blood thinning medication, please restart taking it:..    Be cautious with walking. Numbness and/or weakness in the lower extremities may occur for up to 6-8 hours due to effect of local anesthetic    Avoid strenuous activity for the first 24 hours     You may resume your regular activities after 24 hours     You may shower, however no swimming, tub baths or hot tubs for 24 hours following your procedure     You may use ice packs 10-15 minutes three to four times a day at the injection site for comfort     Do not use heat to painful areas for 6 to 8 hours. This will give the local anesthetic time to wear off and prevent you from accidentally burning your skin.     Unless you have been directed to avoid the use of anti-inflammatory medications (NSAIDS), you may use medications such as ibuprofen, Aleve or Tylenol for pain control if needed.     If you experience any  of the following, call the Pain Clinic during work hours (Monday through Friday 8 am-4:30 pm) at [phone] or the Provider Line after hours at [phone]:   -Fever over 100 degree F    -Swelling, bleeding, redness, drainage, warmth at the injection site    -Progressive weakness or numbness in your legs or arms    -Loss of bowel or bladder function    -Unusual headache that is not relieved by Tylenol    -Unusual new onset of pain that is not improving

## 2021-05-26 NOTE — NURSING NOTE
Pre-procedure Intake    Have you been fasting? Yes     If yes, for how long? 6 hrs      Are you taking a prescribed blood thinner such as coumadin, Plavix, Xarelto?    No    If yes, when did you take your last dose?     Do you take aspirin?  Yes     If cervical procedure, have you held aspirin for 6 days?   No     Do you have any allergies to contrast dye, iodine, steroid and/or numbing medications?  Yes. Contrast dye     Are you currently taking antibiotics or have an active infection?  NO    Have you had a fever/elevated temperature within the past week? NO    Are you currently taking oral steroids? NO    Do you have a ? Yes       Are you pregnant or breastfeeding?  Not Applicable    Have you received the COVID-19 vaccine? Yes       If yes, was it your 1st, 2nd or only dose needed? 2nd dose     Date of most recent vaccine: 04/14/21    Alexandre Curran MA      Notify provider and RNs if systolic BP >170, diastolic BP >100, P >100 or O2 sats < 90%

## 2021-05-27 ENCOUNTER — TELEPHONE (OUTPATIENT)
Dept: BEHAVIORAL HEALTH | Facility: CLINIC | Age: 46
End: 2021-05-27

## 2021-05-27 NOTE — TELEPHONE ENCOUNTER
Therapist received a VM from client asking about family therapy. Therapist returned call and left VM with information about how to schedule family therapy with another [facility] provider by calling [phone].    FIORELLA Diaz, LICSW  5/27/2021

## 2021-06-01 ENCOUNTER — VIRTUAL VISIT (OUTPATIENT)
Dept: PHARMACY | Facility: CLINIC | Age: 46
End: 2021-06-01
Payer: COMMERCIAL

## 2021-06-01 DIAGNOSIS — G47.00 INSOMNIA, UNSPECIFIED TYPE: ICD-10-CM

## 2021-06-01 DIAGNOSIS — M45.8 ANKYLOSING SPONDYLITIS OF SACRAL REGION (H): ICD-10-CM

## 2021-06-01 DIAGNOSIS — G89.4 CHRONIC PAIN SYNDROME: ICD-10-CM

## 2021-06-01 DIAGNOSIS — R11.0 NAUSEA: ICD-10-CM

## 2021-06-01 DIAGNOSIS — E11.9 TYPE 2 DIABETES MELLITUS WITHOUT COMPLICATION, WITHOUT LONG-TERM CURRENT USE OF INSULIN (H): Primary | ICD-10-CM

## 2021-06-01 DIAGNOSIS — F41.8 DEPRESSION WITH ANXIETY: ICD-10-CM

## 2021-06-01 PROCEDURE — 99606 MTMS BY PHARM EST 15 MIN: CPT | Performed by: PHARMACIST

## 2021-06-01 PROCEDURE — 99607 MTMS BY PHARM ADDL 15 MIN: CPT | Performed by: PHARMACIST

## 2021-06-01 RX ORDER — ESZOPICLONE 3 MG/1
3 TABLET, FILM COATED ORAL AT BEDTIME
COMMUNITY
End: 2021-06-02 | Stop reason: DRUGHIGH

## 2021-06-01 RX ORDER — ONDANSETRON 8 MG/1
8 TABLET, ORALLY DISINTEGRATING ORAL EVERY 8 HOURS PRN
COMMUNITY
End: 2023-01-24

## 2021-06-01 NOTE — PROGRESS NOTES
Medication Therapy Management (MTM) Encounter    ASSESSMENT:                            Medication Adherence/Access: No issues identified    Ankylosing Spondilitis: Restarting Enbrel. Continue to monitor.    Mood/Anxiety/Insomnia: May benefit from decrease in insomnia medications. Discussed risks of drug-drug interactions including high risk for CNS depression. Patient will follow up with psychiatry for medication changes.    Pain: Stable; patient followed closely by pain clinic    Nausea: Stable; following with GI. Discussed risks of starting Mestinon including increased risk of bradycardia with metoprolol and precaution in using in people with asthma. Discussed risk vs. Benefit of metoclopramide and considering drug holiday every 3 months or so and recommend keeping dose under 40mg daily. Patient to discuss further with GI    Diabetes: Stable.     PLAN:                          Could consider decrease in insomnia medications. Patient to follow up with psychiatry.    Follow-up: 10 weeks    SUBJECTIVE/OBJECTIVE:                          Joel Pineda is a 45 year old male called for a follow up from 4/20/2021. He was referred to me from Ksenia Lyles.      Reason for visit: Questions about Mestinon,     Allergies/ADRs: Reviewed in chart  Tobacco: He reports that he has never smoked. He has never used smokeless tobacco.  Alcohol: none  Caffeine: 36 ounces/day of coffee  Activity: None  Past Medical History: Reviewed in chart    Medication Adherence/Access: no issues reported    Ankylosing Spondylitis: Current medications include Enbrel 50mg weekly. Patient was switched from Humira to Enbrel because of headaches and burning mouth.Just restarted after being off of for a couple of months.  Patient follows with Dr. Baxter, Rheumatology. Patient is unsure if Enbrel is helpful. Before Enbrel injection he will take famotidine 20mg to prevent any reactions.      Mood/Anxiety/Insomnia: current therapy includes  "lamictal 200mg daily, duloxetine 120mg once daily, clonazepam 0.5mg twice daily, Lunesta 3mg at bedtime,  quetiapine 100mg at bedtime,  melatonin 12mg at bedtime.  Patient's psychiatrist is Dr. Rodriguez at [facility] in [place]. Dreams have been more intense. He has been falling asleep ok but has been sleeping until noon. Typically goes to bed around 10pm. Wakes up occasionally to go to the bathroom. When he does wake up in the middle of the night does feel \"woozy\".      Pain: current therapy includes APAP as needed-patient is taking  1000 mg 2 times daily, lyrica 150mg three times daily, oxycodone 5-10mg every 6 hours as needed maximum of 6 tablets/day (post procedure but hoping to start tapering-working with Dr. Diaz on a plan), baclofen 5-10mg twice daily (usually 15mg a day), nabumetone 500-1000mg twice daily (has been taking 1000mg twice daily), Narcan nasal spray as needed.     Nausea: current therapy includes ondansetron ODT 8mg every 8 hours as needed. Nausea has been manageable. He did try Trulance but it increased motility too much \"like a preprocedure cleanse\". Patient also tried Linzess which caused loose stools so patient would prefer stay on miralax. Patient is taking miralax 8.5gm twice daily, reglan 10mg in the morning and 5mg at night. Patient is aware of the risks associated with long term use of reglan. Patient has been off of reglan in the past for 3-4 months and then needed to restart it. [facility] did review risks of reglan. Patient will try to decrease reglan to 5mg in the morning and afternoon.  Patient was prescribed Mestinon 60mg five times daily. Patient has not started     Diabetes:  Pt currently taking glipizide xl 2.5mg daily. Pt is not experiencing side effects.  SMBG: rarely.     Patient was getting shaky and sweating but have lessened since decreasing glipizide dose.  Recent symptoms of high blood sugar? none  Eye exam: due  Foot exam: due  ACEi/ARB: Yes: Ramipril. "   Urine Albumin:   Lab Results   Component Value Date    UMALCR 21.38 (H) 01/24/2020     Hemoglobin A1C   Date Value Ref Range Status   02/10/2021 6.5 (H) 0 - 5.6 % Final     Comment:     Normal <5.7% Prediabetes 5.7-6.4%  Diabetes 6.5% or higher - adopted from ADA   consensus guidelines.        Aspirin: Taking 81mg daily and denies side effects    Today's Vitals: There were no vitals taken for this visit.  ----------------    I spent 38 minutes with this patient today. All changes were made via collaborative practice agreement with Ksenia Lyles . A copy of the visit note was provided to the patient's primary care provider.    The patient was sent via [employer] a summary of these recommendations.     Radha Mcdonald, Pharm.D, BCACP  Medication Therapy Management Pharmacist    Telemedicine Visit Details  Type of service:  Telephone visit  Start Time: 11:02AM  End Time: 11:40PM  Originating Location (patient location): Home  Distant Location (provider location):  [facility] MTM      Medication Therapy Recommendations  No medication therapy recommendations to display

## 2021-06-01 NOTE — PATIENT INSTRUCTIONS
Recommendations from today's MTM visit:                                                      Today we reviewed what your medicines are for, how to know if they are working, that your medicines are safe and how to make your medicine regimen as easy as possible.    Could consider decrease in insomnia medications. Patient to follow up with psychiatry.    Follow-up: Return in about 10 weeks (around 8/10/2021) for Medication Therapy Management Visit.    It was great to speak with you today.  I value your experience and would be very thankful for your time with providing feedback on our clinic survey. You may receive a survey via email or text message in the next few days.     To schedule another MTM appointment, please call the clinic directly or you may call the MTM scheduling line at [phone] or toll-free at [phone].     My Clinical Pharmacist's contact information:                                                      Please feel free to contact me with any questions or concerns you have.   Radha Mcdonald, Pharm.D, [facility]  Medication Therapy Management Pharmacist

## 2021-06-02 ENCOUNTER — OFFICE VISIT (OUTPATIENT)
Dept: NURSING | Facility: CLINIC | Age: 46
End: 2021-06-02
Payer: MEDICARE

## 2021-06-02 VITALS — BODY MASS INDEX: 38.96 KG/M2 | OXYGEN SATURATION: 98 % | HEART RATE: 86 BPM | WEIGHT: 315 LBS

## 2021-06-02 DIAGNOSIS — J45.20 MILD INTERMITTENT ASTHMA WITHOUT COMPLICATION: Primary | ICD-10-CM

## 2021-06-02 PROCEDURE — 94729 DIFFUSING CAPACITY: CPT | Performed by: INTERNAL MEDICINE

## 2021-06-02 PROCEDURE — 94726 PLETHYSMOGRAPHY LUNG VOLUMES: CPT | Performed by: INTERNAL MEDICINE

## 2021-06-02 PROCEDURE — 94375 RESPIRATORY FLOW VOLUME LOOP: CPT | Performed by: INTERNAL MEDICINE

## 2021-06-02 RX ORDER — ESZOPICLONE 3 MG/1
3 TABLET, FILM COATED ORAL AT BEDTIME
COMMUNITY

## 2021-06-02 NOTE — PROGRESS NOTES
Received MyChart from patient reporting psychiatry decreased Lunesta to 2mg. Med List updated.  Radha Mcdonald, Pharm.D, BCACP  Medication Therapy Management Pharmacist

## 2021-06-03 LAB
DLCOUNC-%PRED-PRE: 96 %
DLCOUNC-PRE: 34.39 ML/MIN/MMHG
DLCOUNC-PRED: 35.66 ML/MIN/MMHG
ERV-%PRED-PRE: 29 %
ERV-PRE: 0.35 L
ERV-PRED: 1.2 L
EXPTIME-PRE: 6.9 SEC
FEF2575-%PRED-PRE: 112 %
FEF2575-PRE: 4.82 L/SEC
FEF2575-PRED: 4.31 L/SEC
FEFMAX-%PRED-PRE: 105 %
FEFMAX-PRE: 11.97 L/SEC
FEFMAX-PRED: 11.37 L/SEC
FEV1-%PRED-PRE: 90 %
FEV1-PRE: 4.38 L
FEV1FEV6-PRE: 83 %
FEV1FEV6-PRED: 81 %
FEV1FVC-PRE: 83 %
FEV1FVC-PRED: 79 %
FEV1SVC-PRE: 81 %
FEV1SVC-PRED: 76 %
FIFMAX-PRE: 9.58 L/SEC
FRCPLETH-%PRED-PRE: 62 %
FRCPLETH-PRE: 2.42 L
FRCPLETH-PRED: 3.85 L
FVC-%PRED-PRE: 85 %
FVC-PRE: 5.29 L
FVC-PRED: 6.19 L
IC-%PRED-PRE: 98 %
IC-PRE: 5.07 L
IC-PRED: 5.15 L
RVPLETH-%PRED-PRE: 88 %
RVPLETH-PRE: 2.07 L
RVPLETH-PRED: 2.33 L
TLCPLETH-%PRED-PRE: 89 %
TLCPLETH-PRE: 7.49 L
TLCPLETH-PRED: 8.34 L
VA-%PRED-PRE: 91 %
VA-PRE: 7.42 L
VC-%PRED-PRE: 85 %
VC-PRE: 5.42 L
VC-PRED: 6.34 L

## 2021-06-04 ENCOUNTER — VIRTUAL VISIT (OUTPATIENT)
Dept: PSYCHOLOGY | Facility: CLINIC | Age: 46
End: 2021-06-04
Payer: MEDICARE

## 2021-06-04 DIAGNOSIS — F33.1 MDD (MAJOR DEPRESSIVE DISORDER), RECURRENT EPISODE, MODERATE (H): Primary | ICD-10-CM

## 2021-06-04 DIAGNOSIS — F41.1 GENERALIZED ANXIETY DISORDER: ICD-10-CM

## 2021-06-04 PROCEDURE — 90834 PSYTX W PT 45 MINUTES: CPT | Mod: 95 | Performed by: SOCIAL WORKER

## 2021-06-04 NOTE — PATIENT INSTRUCTIONS
"SAFETY PLAN:  Step 1: Warning signs / cues (Thoughts, images, mood, situation, behavior) that a crisis may be developing:    Thoughts: \"I can't do this anymore\" and I'd rather be gone than feel this pain    Images: visual hallucinations- last time client experienced this he went directly to hospital    Thinking Processes: disorganized thinking: reported hallucinations in the past and paranoia: .    Mood: worsening depression, hopelessness, helplessness, agitation and increase in pain    Behaviors: sleeping too much and hygiene and ADLs not being done Also look out for medication side effects    Situations: pain and sleep deprivation   Step 2: Coping strategies - Things I can do to take my mind off of my problems without contacting another person (relaxation technique, physical activity):    Distress Tolerance Strategies:  relaxation activities: watch concerts on [employer], play with my pet , sensory based activities/self-soothe with five senses: bubble baths with music playing, change body temperature (ice pack/cold water) , paced breathing/progressive muscle relaxation and training cats.    Physical Activities: go for a walk, deep breathing and stretching     Focus on helpful thoughts:  \"This is temporary\", \"I will get through this\", \"It always passes\", \"Ride the wave\", think about happy memories: remembering warm summer nights riding [place], remind myself of what is important to me: my animals Pumpkin and Patches, my 10 year old niece Susan, my mom, sister, and brother and self-compassion statements: \"This is hard but it can get better.\"  Step 3: People and social settings that provide distraction:   Name: sister Phone: [phone]   Name: Quinn Phone: [phone]   Name: Tono Phone: [phone]    [place] and [facility]   Step 4: Remind myself of people and things that are important to me and worth living for:  My family, mom, sister, brother, and niece and cats. Friend Tono  Step 5: When I " am in crisis, I can ask these people to help me use my safety plan:   Name:  Phone: [phone]   Name: Quinn Phone: [phone]   Name: Tono Phone: [phone]  Step 6: Making the environment safe:     dispose of old medications  and be around others  Step 7: Professionals or agencies I can contact during a crisis:    [facility] Daytime Number: [phone]    Suicide Prevention Lifeline: 0-262-529-FINN (2575)    Crisis Text Line Service (available 24 hours a day, 7 days a week): Text [place] to 576703    [facility] emPATH unit- go to adult ED    Walk in [facility] www.walkin.org    COPE [phone]  Local Crisis Services: 988    Call 911 or go to my nearest emergency department.   I helped develop this safety plan and agree to use it when needed.  I have been given a copy of this plan.      Client signature _________________________________________________________________  Today s date:  5/13/2021  Adapted from Safety Plan Template 2008 Shanita Lazcano and Ariel Lorenzo is reprinted with the express permission of the authors.  No portion of the Safety Plan Template may be reproduced without the express, written permission.  You can contact the authors at bhs@[email or web address].[email or web address] or [email or web address].    FIORELLA Diaz, LICSW  6/4/2021

## 2021-06-04 NOTE — PROGRESS NOTES
Progress Note    Patient Name: Joel Pineda  Date: 2021           Service Type: Individual      Session Start Time: 1pm  Session End Time: 1:45pm     Session Length: 45 minutes    Session #: 2    Attendees: Client    Service Modality:  Video Visit:      Provider verified identity through the following two step process.  Patient provided:  Patient     Telemedicine Visit: The patient's condition can be safely assessed and treated via synchronous audio and visual telemedicine encounter.      Reason for Telemedicine Visit: Services only offered telehealth    Originating Site (Patient Location): Patient's home    Distant Site (Provider Location): Provider Remote Setting    Consent:  The patient/guardian has verbally consented to: the potential risks and benefits of telemedicine (video visit) versus in person care; bill my insurance or make self-payment for services provided; and responsibility for payment of non-covered services.     Patient would like the video invitation sent by:  Send to e-mail at: [email or web address]    Mode of Communication:  Video Conference via [employer]    As the provider I attest to compliance with applicable laws and regulations related to telemedicine.     Treatment Plan Last Reviewed: 2021 due 2021  PHQ-9 / YUDI-7 :     DATA  Interactive Complexity: No  Crisis: No       Progress Since Last Session (Related to Symptoms / Goals / Homework):   Symptoms: No change    Homework: Completed in session      Episode of Care Goals: No improvement - CONTEMPLATION (Considering change and yet undecided); Intervened by assessing the negative and positive thinking (ambivalence) about behavior change     Current / Ongoing Stressors and Concerns:  Therapist and client review and complete thorough safety plan. Client agrees to print out and keep a copy available to him. Client will also look up a list of self compassion statements and pick 5  of them to being saying to himself.       Treatment Objective(s) Addressed in This Session:   Safety   childhood trauma, family conflict, chronic pain     Intervention:   Safety planning; self compassion        ASSESSMENT: Current Emotional / Mental Status (status of significant symptoms):   Risk status (Self / Other harm or suicidal ideation)   Patient denies current fears or concerns for personal safety.   Patient reports the following current or recent suicidal ideation or behaviors: 1/5, 5 being high  patient reported no plan and contracted for safety.   Patient denies current or recent homicidal ideation or behaviors.   Patient denies current or recent self injurious behavior or ideation.   Patient denies other safety concerns.   Patient reports there has been no change in risk factors since their last session.     Patient reports there has been no change in protective factors since their last session.    A safety and risk management plan has been developed including: Patient consented to co-developed safety plan.  Safety and risk management plan was completed.  Patient agreed to use safety plan should any safety concerns arise.  A copy was given to the patient.     Appearance:   Appropriate    Eye Contact:   Fair    Psychomotor Behavior: Agitated  Restless    Attitude:   Cooperative  Friendly Pleasant   Orientation:   All   Speech    Rate / Production: Emotional Talkative    Volume:  Normal    Mood:    Anxious  Depressed  Agitated   Affect:    Worrisome    Thought Content:  Clear    Thought Form:  Coherent    Insight:    Fair      Medication Review:   Changes to psychiatric medications, see updated Medication List in EPIC.      Medication Compliance:   Yes     Changes in Health Issues:   Yes: Pain, Associated Psychological Distress     Chemical Use Review:   Substance Use: Chemical use reviewed, no active concerns identified      Tobacco Use: No current tobacco use.      Diagnosis:  1. MDD (major depressive  disorder), recurrent episode, moderate (H)    2. Generalized anxiety disorder          R/O  296.89 Bipolar II Disorder Depressed  301.83 (F60.3) Borderline Personality Disorder as evidenced by past dx.    Collateral Reports Completed:   Not Applicable    PLAN: (Patient Tasks / Therapist Tasks / Other)  Patient to follow safety plan and go to ED if he cannot remain safe  Utilize effective coping and grounding skills- use safety plan as needed        Carolynn Rowley  6/4/2021                                                         ______________________________________________________________________    Treatment Plan    Patient's Name: Joel Pineda  YOB: 1975    Date: 5/13/2021    DSM5 Diagnoses: 296.32 (F33.1) Major Depressive Disorder, Recurrent Episode, Moderate With mixed features or 300.02 (F41.1) Generalized Anxiety Disorder  Psychosocial / Contextual Factors: childhood trauma, family conflict, chronic pain  WHODAS: 32    Referral / Collaboration:  referral made for transfer to [facility].    Anticipated number of session or this episode of care: 16      MeasurableTreatment Goal(s) related to diagnosis / functional impairment(s)  Goal 1: Patient will report absence of persistent depression mood and report of reduced frequency and intensity of low mood and absence of persistent low energy and motivation to acceptable levels, report subjective improved motivation and increased energy for a period of 90 days, within 6 months as clinically observed and by patient self-report    I will know I've met my goal when my action and motivation and action match.      Objective #A (Patient Action)    Patient will demonstrate and report a level of depression that can be managed at a lower level of care.  Absence of persistent depression mood and report of reduced frequency and intensity of low mood and absence of persistent low energy and motivation to acceptable levels, report subjective improved motivation  and increased energy for a period of 90 days, within 6 months as clinically observed and by patient self-report.  Status: New - Date: 5/13/2021     Intervention(s)  Therapist will provide individual therapy to identify triggers to depression, gain feedback on helpful coping tools and thought-reframing techniques, and identify preferred way of being.  Tx to include discussion of current stressors and interpersonal conflicts and how to cope with these, coaching, diagnostic testing, referral for medication as indicated, use prescribed medication, cognitive restructuring, interpersonal, family therapy, supportive therapy services.        Goal 2: Patient will report absence of persistent anxiety mood and report of reduced frequency and intensity of worry and absence of persistent anxious mood to acceptable levels, no panic attacks, report subjective comfort with rumination for a period of 90 days. Within 6 months as clinically observed and by patient self-report    I will know I've met my goal when I am not too trouble by what goes on around me.      Objective #A (Patient Action)    Status: New - Date: 5/13/2021     Patient will demonstrate and report a level of anxiety that can be managed at a lower level of care.  Absence of persistent anxious mood and report of reduced frequency and intensity of worry and absence of persistent anxious mood to acceptable levels, no panic attacks, report subjective comfort with rumination for a period of 90 days, within 6 months as clinically observed and by patient self-report.    Intervention(s)  Therapist will provide individual therapy to identify triggers to anxiety, gain feedback on helpful coping tools and thought-reframing techniques, and identify preferred way of being. Tx to include discuss current stressors and interpersonal conflicts and how to cope with these, coaching, diagnostic testing , referral for medication as indicated, use prescribed medication, cognitive  "restructuring, interpersonal,   family therapy, supportive therapy services.        Goal 3: Patiient will report absence of persistent SI and report of reduced frequency and intensity of SI and absence of SI to acceptable levels, report subjective improved mood for a period of 90 days, within 6 months as clinically observed and by patient self-report    I will know I've met my goal when I no longer struggle with them daily.      Objective #A (Patient Action)    Status: New - Date: 5/13/2021     Patient will demonstrate control of impulses and ability to use positive coping tools to manage strong emotions that can be safely addressed at a lower level of care. Absence of persistent SI and report of reduced frequency and intensity of SI and absence of SI to acceptable levels, report subjective improved mood for a period of 90 days, within 6 months as clinically observed and by patient self-report.    Intervention(s)  Therapist will provide individual therapy to identify triggers to SI urges, gain feedback on helpful coping strategies, and identify ways that family can offer support. Tx to discuss current stressors and interpersonal conflicts and how to cope with these, coaching, diagnostic testing, referral for medication as indicated, use prescribed medication, cognitive restructuring, interpersonal family therapy, supportive therapy services.      Patient has reviewed and agreed to the above plan.      Carolynn Rowley  May 17, 2021                                                   Joel Pineda     SAFETY PLAN:  Step 1: Warning signs / cues (Thoughts, images, mood, situation, behavior) that a crisis may be developing:    Thoughts: \"I can't do this anymore\" and I'd rather be gone than feel this pain    Images: visual hallucinations- last time client experienced this he went directly to hospital    Thinking Processes: disorganized thinking: reported hallucinations in the past and paranoia: .    Mood: worsening " "depression, hopelessness, helplessness, agitation and increase in pain    Behaviors: sleeping too much and hygiene and ADLs not being done Also look out for medication side effects    Situations: pain and sleep deprivation   Step 2: Coping strategies - Things I can do to take my mind off of my problems without contacting another person (relaxation technique, physical activity):    Distress Tolerance Strategies:  relaxation activities: watch concerts on [employer], play with my pet , sensory based activities/self-soothe with five senses: bubble baths with music playing, change body temperature (ice pack/cold water) , paced breathing/progressive muscle relaxation and training cats.    Physical Activities: go for a walk, deep breathing and stretching     Focus on helpful thoughts:  \"This is temporary\", \"I will get through this\", \"It always passes\", \"Ride the wave\", think about happy memories: remembering warm summer nights riding [place] bike [place] [email or web address] [place], remind myself of what is important to me: my animals Pumpkin and Patches, my 10 year old niece Susan, my mom, sister, and brother and self-compassion statements: \"This is hard but it can get better.\"  Step 3: People and social settings that provide distraction:   Name:  Phone: [phone]   Name: Quinn Phone: [phone]   Name: Tono Phone: [phone]    [place] and [place]   Step 4: Remind myself of people and things that are important to me and worth living for:  My family, mom, sister, brother, and niece and cats. Friend Tono  Step 5: When I am in crisis, I can ask these people to help me use my safety plan:   Name:  Phone: [phone]   Name: Quinn Phone: [phone]   Name: Tono Phone: [phone]  Step 6: Making the environment safe:     dispose of old medications  and be around others  Step 7: Professionals or agencies I can contact during a crisis:    [facility] Number: [phone]    Suicide Prevention Lifeline: " 1-543-784-TALK (4377)    Crisis Text Line Service (available 24 hours a day, 7 days a week): Text [place] to 471343    [facility] unit- go to adult ED    [facility] www[facility]walkin.org    COPE [phone]  Local Crisis Services: 988    Call 911 or go to my nearest emergency department.   I helped develop this safety plan and agree to use it when needed.  I have been given a copy of this plan.      Client signature _________________________________________________________________  Today s date:  5/13/2021  Adapted from Safety Plan Template 2008 Shanita Lazcano and Ariel Lorenzo is reprinted with the express permission of the authors.  No portion of the Safety Plan Template may be reproduced without the express, written permission.  You can contact the authors at bhs@[email or web address].[email or web address] or [email or web address].  FIORELLA Diaz, LICSW  6/4/2021

## 2021-06-09 ENCOUNTER — OFFICE VISIT (OUTPATIENT)
Dept: PULMONOLOGY | Facility: CLINIC | Age: 46
End: 2021-06-09
Payer: MEDICARE

## 2021-06-09 VITALS
HEIGHT: 78 IN | OXYGEN SATURATION: 97 % | WEIGHT: 315 LBS | BODY MASS INDEX: 36.45 KG/M2 | SYSTOLIC BLOOD PRESSURE: 125 MMHG | DIASTOLIC BLOOD PRESSURE: 78 MMHG | HEART RATE: 72 BPM | TEMPERATURE: 98.2 F

## 2021-06-09 DIAGNOSIS — J45.30 MILD PERSISTENT ASTHMA, UNSPECIFIED WHETHER COMPLICATED: ICD-10-CM

## 2021-06-09 PROCEDURE — 99214 OFFICE O/P EST MOD 30 MIN: CPT | Performed by: INTERNAL MEDICINE

## 2021-06-09 RX ORDER — ALBUTEROL SULFATE 90 UG/1
2 AEROSOL, METERED RESPIRATORY (INHALATION) EVERY 4 HOURS PRN
Qty: 8.5 G | Refills: 11 | Status: SHIPPED | OUTPATIENT
Start: 2021-06-09 | End: 2022-06-08

## 2021-06-09 ASSESSMENT — ASTHMA QUESTIONNAIRES
QUESTION_3 LAST FOUR WEEKS HOW OFTEN DID YOUR ASTHMA SYMPTOMS (WHEEZING, COUGHING, SHORTNESS OF BREATH, CHEST TIGHTNESS OR PAIN) WAKE YOU UP AT NIGHT OR EARLIER THAN USUAL IN THE MORNING: NOT AT ALL
ACT_TOTALSCORE: 21
QUESTION_2 LAST FOUR WEEKS HOW OFTEN HAVE YOU HAD SHORTNESS OF BREATH: ONCE OR TWICE A WEEK
QUESTION_5 LAST FOUR WEEKS HOW WOULD YOU RATE YOUR ASTHMA CONTROL: WELL CONTROLLED
QUESTION_1 LAST FOUR WEEKS HOW MUCH OF THE TIME DID YOUR ASTHMA KEEP YOU FROM GETTING AS MUCH DONE AT WORK, SCHOOL OR AT HOME: NONE OF THE TIME
QUESTION_4 LAST FOUR WEEKS HOW OFTEN HAVE YOU USED YOUR RESCUE INHALER OR NEBULIZER MEDICATION (SUCH AS ALBUTEROL): TWO OR THREE TIMES PER WEEK
ACUTE_EXACERBATION_TODAY: NO

## 2021-06-09 ASSESSMENT — PAIN SCALES - GENERAL: PAINLEVEL: MODERATE PAIN (4)

## 2021-06-09 ASSESSMENT — MIFFLIN-ST. JEOR: SCORE: 2536.87

## 2021-06-09 NOTE — PROGRESS NOTES
Joel Pineda's goals for this visit include: Consult  He requests these members of his care team be copied on today's visit information: PCP    PCP: Ksenia Lyles    Referring Provider:  No referring provider defined for this encounter.    There were no vitals taken for this visit.    Do you need any medication refills at today's visit? TINA Szymanski LPN  Pulmonary Medicine:  [facility]  Phone: [phone] Fax: [phone]

## 2021-06-09 NOTE — PROGRESS NOTES
Pulmonary Clinic Retrun Patient Consult  Reason for Consult: Asthma  History of Present Illness  I had the pleasure of seeing Joel Pineda, who is a pleasant 45-year-old male with a history of ankylosing spondylitis on Humira, acid reflux, allergies, and asthma who presents to pulmonary clinic today for further follow-up of asthma. He was last seen by my colleague- Dr Segovia on 06/11/2020. His asthma had been decently well controlled on high dose Advair, Singulair, and albuterol as needed and pulmonary function has historically been normal.  Previous attempts to de-escalate asthma therapy yielded nearly immediate return to symptoms.  To briefly review. He was diagnosed with asthma in high school in the 9th grade and it was initially exercise induced. He has tried different inhalers throughout the years and was started with an ICS at a young age.  PE's in early 40s - not on any blood thinners now. Not sure if it is provoked and was treated with warfarin for 6 months.  Today, excellent control with minimal daytime and night time symptoms. No increased SOB, denies any cough, chest tightness and wheezing. No hemoptysis and no fevers. No flares recently on steroid burst treatments. He is scheduled to start aerobic exercises for weight loss management.  He was recently admitted in 03/2021 a [facility] for gastroparesis while on Reglan and was given a new prescription for pyridostigmine. He also has GERD which is slightly worsened on Prilosec 20 mg daily. He also has LLOYD and uses his CPAP regularly.  Never smoker. Currently on medical disability, worked as  at [employer]    Review of Systems:  10 of 14 systems reviewed and are negative unless otherwise stated in HPI.    Past Medical History:   Diagnosis Date     Acne      Acquired hypothyroidism      Allergic state      Ankylosing spondylitis lumbar region (H)      Ankylosing spondylitis of sacral region (H)      Anxiety      Bipolar 2 disorder (H)       Chest pain     Chest pain, regulated w/BP meds. Clear arteries.     Chronic pain      DDD (degenerative disc disease), lumbar      Depressive disorder      Diabetes (H)      Diverticulosis      Facet arthritis of cervical region      Gastroesophageal reflux disease      Hypertension      IBS (irritable bowel syndrome)      Intracranial arachnoid cyst      Polyneuropathy      Pulmonary embolism (H)      Skin exam, screening for cancer 12/3/2013     Sleep apnea      Uncomplicated asthma        Past Surgical History:   Procedure Laterality Date     BACK SURGERY  10/07    lumbar discectomy L5-S1     COLONOSCOPY      Note: colonoscopy scheduled with P on Friday, 9/4/15     COSMETIC SURGERY  2012    Nose Exterior - functional     GI SURGERY  August 2013    Sigmoidectomy     HERNIA REPAIR, UMBILICAL  8/23/11    hen[place], Dr. Evan Beavers     INCISION AND DRAINAGE, ABSCESS, COMPLEX  8/23/11    umbilical, Dr. Evan Beavers     LAPAROSCOPIC ASSISTED COLECTOMY LEFT (DESCENDING)  8/15/2013    Procedure: LAPAROSCOPIC ASSISTED COLECTOMY LEFT (DESCENDING);  Laparoscopic Hand Assisted Sigmoid Resection, Mobilization of Splenic Fissure, coloproctoscopy, *Latex Free Room* Anesthesia General with Pain block  ;  Surgeon: Aurora Justice MD;  Location: [facility] OR     NERVE SURGERY  8/18/11    RF ablation @ L3-S1 @ [facility]     RECONSTRUCT NOSE AND SEPTUM (FUNCTIONAL)  10/14/2011    Procedure:RECONSTRUCT NOSE AND SEPTUM (FUNCTIONAL); Functional Septorhinoplasty, Turbinate Reduction, ; Surgeon:CEDRIC CUEVAS; Location:[facility] OR     SINUS SURGERY  10/1/01    ethmoidectomy chronic sinusitis       Family History   Problem Relation Age of Onset     Musculoskeletal Disorder Mother         back     Anxiety Disorder Mother      Colon Polyps Mother      Ulcerative Colitis Mother         and ischemic small intestine, surgery     Hypertension Mother      Breast Cancer Mother      Osteoporosis Mother      Diabetes Mother         Type 2, Diagnosed in 2014  "    Depression Mother         Takes Cymbalta to help with chronic pain + depx     Thyroid Disease Mother         Hypothyroidism     Obesity Mother         Under much better control latter half of 2015     Musculoskeletal Disorder Father         back     Substance Abuse Father      Hypertension Father      Hyperlipidemia Father      Depression Father         Off meds for many years. Seems \"ok\"     Heart Disease Maternal Grandmother      Heart Disease Maternal Grandfather      Psychotic Disorder Paternal Grandfather      Suicide Paternal Grandfather      Depression Paternal Grandfather         Pediatrician. Committed suicide by pistol in 1990.     Musculoskeletal Disorder Brother         back     Depression Brother         Expressed as anger and moodiness     Substance Abuse Brother      Substance Abuse Sister      Depression Sister         Mental Health Therapist, yet no anti-depressants?     Anxiety Disorder Sister         Mental Health Therapist, yet no anti-anxiety meds?     Other Cancer Other         Bladder Cancer - Fatal     Substance Abuse Brother      Colon Cancer No family hx of      Crohn's Disease No family hx of      Anesthesia Reaction No family hx of      Cancer No family hx of         No family history of skin cancer       Social History     Socioeconomic History     Marital status: Single     Spouse name: Not on file     Number of children: Not on file     Years of education: Not on file     Highest education level: Not on file   Occupational History     Occupation:      Employer: [employer]     Occupation: paraprofessional     Comment: [email or web address]     Employer: [employer]   Social Needs     Financial resource strain: Not on file     Food insecurity     Worry: Not on file     Inability: Not on file     Transportation needs     Medical: Not on file     Non-medical: Not on file   Tobacco Use     Smoking status: Never Smoker     Smokeless tobacco: Never Used   Substance and Sexual " Activity     Alcohol use: Not Currently     Alcohol/week: 0.0 standard drinks     Drinks per session: 1 or 2     Binge frequency: Weekly     Comment: occ 1/week     Drug use: No     Sexual activity: Never     Partners: Female     Birth control/protection: None   Lifestyle     Physical activity     Days per week: Not on file     Minutes per session: Not on file     Stress: Not on file   Relationships     Social connections     Talks on phone: Not on file     Gets together: Not on file     Attends Religion service: Not on file     Active member of club or organization: Not on file     Attends meetings of clubs or organizations: Not on file     Relationship status: Not on file     Intimate partner violence     Fear of current or ex partner: Not on file     Emotionally abused: Not on file     Physically abused: Not on file     Forced sexual activity: Not on file   Other Topics Concern     Parent/sibling w/ CABG, MI or angioplasty before 65F 55M? No      Service Not Asked     Blood Transfusions Not Asked     Caffeine Concern Not Asked     Occupational Exposure Not Asked     Hobby Hazards Not Asked     Sleep Concern Yes     Stress Concern Yes     Weight Concern Not Asked     Special Diet Not Asked     Back Care Yes     Exercise Not Asked     Bike Helmet Not Asked     Seat Belt Yes     Self-Exams Not Asked   Social History Narrative     Not on file         Allergies   Allergen Reactions     Amoxicillin-Pot Clavulanate Difficulty breathing     Banana Shortness Of Breath     Pt reports organic Banana is okay.      Nitroglycerin Palpitations     Penicillins Anaphylaxis     Provigil [Modafinil] Shortness Of Breath     headache     Gadolinium Hives and Itching     Patient was premedicated for the contrast allergy. He did still have a reaction a few hours after injection. Hives and itching. Dr. Gomez told tech to inform pt he should only have contrast again in the future when premedicated and at a hospital. Not  at an outpatient facility.      Ketoconazole      Topical cream caused swelling and itching     Dye [Contrast Dye] Other (See Comments) and Hives     Moderate flushing, CT contrast     Golimumab      Hives, bradycardia, face swelling     Neurontin [Gabapentin] Hives     Moderate hives     Nortriptyline Hives     Varicella Virus Vaccine Live      Rash     Flagyl [Metronidazole Hcl] Palpitations and Hives     Latex Rash     Metronidazole Palpitations, Other (See Comments) and Rash     dizziness (versus ciprofloxacin taken at same time)         Current Outpatient Medications:      acetaminophen 500 MG CAPS, Take 1,000 mg by mouth 2 times daily , Disp: 60 capsule, Rfl:      albuterol (PROAIR HFA/PROVENTIL HFA/VENTOLIN HFA) 108 (90 Base) MCG/ACT inhaler, Inhale 2 puffs into the lungs every 4 hours as needed for shortness of breath / dyspnea or wheezing, Disp: 8.5 g, Rfl: 11     aspirin (ASA) 81 MG tablet, Take 81 mg by mouth daily , Disp: , Rfl:      baclofen (LIORESAL) 10 MG tablet, Take 0.5-1 tablets (5-10 mg) by mouth 2 times daily, Disp: 60 tablet, Rfl: 1     cetirizine (ZYRTEC) 10 MG tablet, Take 1 tablet (10 mg) by mouth At Bedtime, Disp: 30 tablet, Rfl: 11     cholecalciferol (D3-50) 1250 mcg (74576 units) capsule, TAKE 1 CAPSULE BY MOUTH EVERY 2 WEEKS, Disp: 24 capsule, Rfl: 0     clonazePAM (KLONOPIN) 0.5 MG tablet, Take 0.5 mg by mouth 2 times daily as needed , Disp: , Rfl:      cyanocobalamin (CYANOCOBALAMIN) 1000 MCG/ML injection, Inject 1 mL (1,000 mcg) into the muscle every 30 days, Disp: 10 mL, Rfl: 0     DULoxetine (CYMBALTA) 60 MG capsule, Take 120 mg by mouth daily , Disp: , Rfl:      EPINEPHrine (ANY BX GENERIC EQUIV) 0.3 MG/0.3ML injection 2-pack, Inject 0.3 mLs (0.3 mg) into the muscle once as needed for anaphylaxis, Disp: 0.6 mL, Rfl: 3     eszopiclone (LUNESTA) 2 MG tablet, Take 2 mg by mouth At Bedtime, Disp: , Rfl:      etanercept (ENBREL SURECLICK) 50 MG/ML autoinjector, Inject 50 mg  Subcutaneous once a week . Hold for signs of infection, and seek medical attention., Disp: 4 mL, Rfl: 12     famotidine (PEPCID) 20 MG tablet, Prior to administration of Humira every 2 weeks (Patient taking differently: Take 20 mg by mouth every 7 days Prior to Enbrel Injections to prevent skin reaction), Disp: , Rfl:      fluticasone (FLONASE) 50 MCG/ACT nasal spray, Spray 1 spray into both nostrils daily, Disp: , Rfl:      fluticasone-salmeterol (ADVAIR) 500-50 MCG/DOSE inhaler, Inhale 1 puff into the lungs every 12 hours, Disp: 3 each, Rfl: 11     glipiZIDE (GLUCOTROL XL) 2.5 MG 24 hr tablet, Take 1 tablet (2.5 mg) by mouth daily, Disp: 90 tablet, Rfl: 0     lamoTRIgine (LAMICTAL) 100 MG tablet, Take 200 mg by mouth daily, Disp: , Rfl:      levothyroxine (SYNTHROID/LEVOTHROID) 75 MCG tablet, TAKE 1 TABLET EVERY MORNING, Disp: 90 tablet, Rfl: 3     lidocaine (XYLOCAINE) 5 % external ointment, Apply topically 4 times daily as needed (pain), Disp: 50 g, Rfl: 2     melatonin 3 MG tablet, Take 12 mg by mouth nightly as needed , Disp: , Rfl:      metoclopramide (REGLAN) 5 MG tablet, Take 5 mg by mouth 2 times daily, Disp: , Rfl:      metoprolol succinate ER (TOPROL-XL) 200 MG 24 hr tablet, Take 1 tablet (200 mg) by mouth 2 times daily, Disp: 180 tablet, Rfl: 1     montelukast (SINGULAIR) 10 MG tablet, Take 1 tablet (10 mg) by mouth every evening, Disp: 90 tablet, Rfl: 3     nabumetone (RELAFEN) 500 MG tablet, TAKE 1-2 TABLETS BY MOUTH TWICE DAILY WITH MEALS AS NEEDED FOR BACK/JOINT PAIN, Disp: 60 tablet, Rfl: 2     naloxone (NARCAN) 4 MG/0.1ML nasal spray, Spray 1 spray (4 mg) into one nostril alternating nostrils as needed for opioid reversal every 2-3 minutes until assistance arrives, Disp: 0.2 mL, Rfl: 0     olopatadine (PATADAY) 0.2 % ophthalmic solution, Place 1 drop into both eyes daily, Disp: 2.5 mL, Rfl: 3     omega-3 acid ethyl esters (LOVAZA) 1 g capsule, TAKE 2 CAPSULES BY MOUTH TWICE DAILY , Disp: 360  "capsule, Rfl: 1     omeprazole 20 MG tablet, Take 20 mg by mouth daily , Disp: , Rfl:      ondansetron (ZOFRAN-ODT) 8 MG ODT tab, Take 8 mg by mouth every 8 hours as needed for nausea, Disp: , Rfl:      order for DME, Equipment being ordered: Assure Compression stockings (ANNETTA) Large, closed toe, thigh-high 30-40 compression, Disp: 2 Units, Rfl: 3     order for DME, Equipment being ordered: lumbosacral belt/brace, Disp: 1 Units, Rfl: 0     order for DME, Respironics REMSTAR 60 Series Auto CPAP 9-13 cm H2O, Wisp nasal mask w/a large cushion and a chinstrap, Disp: , Rfl:      oxyCODONE (ROXICODONE) 5 MG tablet, Take 1-2 tablets (5-10 mg) by mouth every 6 hours as needed for pain (maximum 6 tablet(s) per day) . Acute on chronic pain, Disp: 30 tablet, Rfl: 0     polyethylene glycol (MIRALAX) 17 g packet, Take 1 packet by mouth daily, Disp: , Rfl:      pregabalin (LYRICA) 150 MG capsule, Take 1 capsule (150 mg) by mouth 3 times daily, Disp: 90 capsule, Rfl: 5     QUEtiapine (SEROQUEL) 100 MG tablet, Take 100 mg by mouth At Bedtime, Disp: , Rfl:      ramipril (ALTACE) 10 MG capsule, Take 1 capsule (10 mg) by mouth daily, Disp: 90 capsule, Rfl: 1     rizatriptan (MAXALT-MLT) 5 MG ODT, DISSOLVE 1 TABLET BY MOUTH AT ONSET OF HEADACHE, Disp: 30 tablet, Rfl: 0     rosuvastatin (CRESTOR) 40 MG tablet, Take 1 tablet (40 mg) by mouth daily, Disp: 90 tablet, Rfl: 2     syringe, disposable, (BD TUBERCULIN SYRINGE) 1 ML MISC, Equipment being ordered: 1 ml tuberculin syringes to be used for Vitamin B12 injections., Disp: 12 each, Rfl: 11     syringe/needle, disp, (BD INTEGRA SYRINGE) 25G X 1\" 3 ML MISC, USE FOR VITAMIN B12 INJECTIONS, Disp: 12 each, Rfl: 0     vitamin B complex with vitamin C (STRESS TAB) tablet, Take 1 tablet by mouth daily, Disp: , Rfl:      ZINC SULFATE-VITAMIN C MT, Take 1 tablet by mouth daily, Disp: , Rfl:     Current Facility-Administered Medications:      fentaNYL (PF) (SUBLIMAZE) injection 12.5-25 mcg, " "12.5-25 mcg, Intravenous, Q5 Min PRN, Elaine Diaz MD, 25 mcg at 05/26/21 1111     midazolam (VERSED) injection 0.5-2 mg, 0.5-2 mg, Intravenous, Q5 Min PRN, Elaine Diaz MD, 2 mg at 05/26/21 1046      Physical Exam:  /78   Pulse 72   Temp 98.2  F (36.8  C)   Ht 1.981 m (6' 6\")   Wt (!) 152.9 kg (337 lb)   SpO2 97%   BMI 38.94 kg/m    GENERAL: Well developed, well nourished, alert, and in no apparent distress.  HEENT: Normocephalic, atraumatic. PERRL, EOMI. Oral mucosa is moist. No perioral cyanosis.  NECK: supple, no masses, no thyromegaly.  RESP:  Normal respiratory effort.  CTAB.  No rales, wheezes, rhonchi.  No cyanosis or clubbing.  CV: Normal S1, S2, regular rhythm, normal rate. No murmur.  No LE edema.   ABDOMEN:  Soft, non-tender, non-distended.   SKIN: warm and dry. No rash.  NEURO: AAOx3.  Normal gait.  Fluent speech.  PSYCH: mentation appears normal.       Results:  PFTs: Normal lung volumes, flows, volume loops and preserved diffusion.  Most Recent [facility] Pulmonary Function Testing    FVC-Pred   Date Value Ref Range Status   06/02/2021 6.19 L      FVC-Pre   Date Value Ref Range Status   06/02/2021 5.29 L      FVC-%Pred-Pre   Date Value Ref Range Status   06/02/2021 85 %      FEV1-Pre   Date Value Ref Range Status   06/02/2021 4.38 L      FEV1-%Pred-Pre   Date Value Ref Range Status   06/02/2021 90 %      FEV1FVC-Pred   Date Value Ref Range Status   06/02/2021 79 %      FEV1FVC-Pre   Date Value Ref Range Status   06/02/2021 83 %      No results found for: 20029  FEFMax-Pred   Date Value Ref Range Status   06/02/2021 11.37 L/sec      FEFMax-Pre   Date Value Ref Range Status   06/02/2021 11.97 L/sec      FEFMax-%Pred-Pre   Date Value Ref Range Status   06/02/2021 105 %      ExpTime-Pre   Date Value Ref Range Status   06/02/2021 6.90 sec      FIFMax-Pre   Date Value Ref Range Status   06/02/2021 9.58 L/sec      FEV1FEV6-Pred   Date Value Ref Range Status   06/02/2021 81 %  "     FEV1FEV6-Pre   Date Value Ref Range Status   06/02/2021 83 %      No results found for: 20055  Imaging (personally reviewed in clinic today): None      Assessment and Plan:   Moderate Persistent Asthma  ACT score is today is 21.  I reviewed his PFTs and there is normal lung volumes, flows, volume loops and preserved diffusion. His symptoms appear to be well controlled on high dose Advair and albuterol as needed. I will go ahead and deescalate his regime to lower dose Advair and to continue as albuterol as needed.  Tito was educated on the fact that obesity, GERD and LLOYD can make asthma control more difficult and he knows the importance of weight loss, GERD control (now on Prilosec daily) and compliance with his CPAP for LLYOD control. He may benefit from increasing the dose of his Prilosec, he knows to increase to 40 mg if his symptoms continue to worsen even after control of his gastroparesis.  With regards the use of pyridostigmine for gastroparesis, I informed Tito that it may make his asthma control difficult but he can try it and see if there are any changes to his asthma symptoms. It appears that gastroparesis is very bothersome and it is worth using the new medication even with known caution in asthmatics.  For the above reason, I will not deescalate his asthma regimen for now. If he is able to tolerate his scheduled aerobic exercises and pyridostigmine for 3 months, I will deescalate to a lower dose Advair.  Educated on trigger avoidance    Questions and concerns were answered to the patient's satisfaction.  he was provided with my contact information should new questions or concerns arise in the interim.  He should return to clinic in 12 months  He is up to date on a seasonal influenza vaccine, Prevnar (2015) and Pneumovax (2016).    Sue Gunter MD  Pulmonary, Critical Care and Sleep Medicine  [facility]  Office: [phone]      The above note was dictated using voice  recognition software and may include typographical errors. Please contact the author for any clarifications.

## 2021-06-10 ENCOUNTER — MYC MEDICAL ADVICE (OUTPATIENT)
Dept: FAMILY MEDICINE | Facility: CLINIC | Age: 46
End: 2021-06-10

## 2021-06-10 ENCOUNTER — E-VISIT (OUTPATIENT)
Dept: FAMILY MEDICINE | Facility: CLINIC | Age: 46
End: 2021-06-10
Payer: MEDICARE

## 2021-06-10 DIAGNOSIS — R21 RASH: Primary | ICD-10-CM

## 2021-06-10 PROCEDURE — 99207 PR NON-BILLABLE SERV PER CHARTING: CPT | Performed by: PHYSICIAN ASSISTANT

## 2021-06-10 ASSESSMENT — ASTHMA QUESTIONNAIRES: ACT_TOTALSCORE: 21

## 2021-06-10 NOTE — TELEPHONE ENCOUNTER
Provider E-Visit time total (minutes): no charge- patient never answered message to him and he went to urgent care

## 2021-06-11 ENCOUNTER — VIRTUAL VISIT (OUTPATIENT)
Dept: PSYCHOLOGY | Facility: CLINIC | Age: 46
End: 2021-06-11
Payer: MEDICARE

## 2021-06-11 DIAGNOSIS — F33.1 MDD (MAJOR DEPRESSIVE DISORDER), RECURRENT EPISODE, MODERATE (H): Primary | ICD-10-CM

## 2021-06-11 DIAGNOSIS — F41.1 GENERALIZED ANXIETY DISORDER: ICD-10-CM

## 2021-06-11 PROCEDURE — 90834 PSYTX W PT 45 MINUTES: CPT | Mod: 95 | Performed by: SOCIAL WORKER

## 2021-06-11 NOTE — PROGRESS NOTES
Progress Note    Patient Name: Joel Pineda  Date: 2021           Service Type: Individual      Session Start Time: 12pm  Session End Time: 12:45pm     Session Length: 45 minutes    Session #: 3    Attendees: Client    Service Modality:  Video Visit:      Provider verified identity through the following two step process.  Patient provided:  Patient     Telemedicine Visit: The patient's condition can be safely assessed and treated via synchronous audio and visual telemedicine encounter.      Reason for Telemedicine Visit: Services only offered telehealth    Originating Site (Patient Location): Patient's home    Distant Site (Provider Location): Provider Remote Setting    Consent:  The patient/guardian has verbally consented to: the potential risks and benefits of telemedicine (video visit) versus in person care; bill my insurance or make self-payment for services provided; and responsibility for payment of non-covered services.     Patient would like the video invitation sent by:  Send to e-mail at: [email or web address]    Mode of Communication:  Video Conference via [employer]    As the provider I attest to compliance with applicable laws and regulations related to telemedicine.     Treatment Plan Last Reviewed: 2021 due 2021  PHQ-9 / YUDI-7 :     DATA  Interactive Complexity: No  Crisis: No       Progress Since Last Session (Related to Symptoms / Goals / Homework):   Symptoms: No change    Homework: Completed in session      Episode of Care Goals: No improvement - CONTEMPLATION (Considering change and yet undecided); Intervened by assessing the negative and positive thinking (ambivalence) about behavior change     Current / Ongoing Stressors and Concerns:  Today client discussed his practice with self compassion exercises- reports he struggled with judging the practice. Discussed strategies for increasing his structure and support systems.         Treatment Objective(s) Addressed in This Session:   Safety   childhood trauma, family conflict, chronic pain     Intervention:   Solution focused: recommendations for increasing structure and support; self compassion using a nonjudgemental stance        ASSESSMENT: Current Emotional / Mental Status (status of significant symptoms):   Risk status (Self / Other harm or suicidal ideation)   Patient denies current fears or concerns for personal safety.   Patient reports the following current or recent suicidal ideation or behaviors: 1/5, 5 being high  patient reported no plan and contracted for safety.   Patient denies current or recent homicidal ideation or behaviors.   Patient denies current or recent self injurious behavior or ideation.   Patient denies other safety concerns.   Patient reports there has been no change in risk factors since their last session.     Patient reports there has been no change in protective factors since their last session.    A safety and risk management plan has been developed including: Patient consented to co-developed safety plan.  Safety and risk management plan was completed.  Patient agreed to use safety plan should any safety concerns arise.  A copy was given to the patient.     Appearance:   Appropriate    Eye Contact:   Fair    Psychomotor Behavior: Agitated  Restless    Attitude:   Cooperative  Friendly Pleasant   Orientation:   All   Speech    Rate / Production: Emotional Talkative    Volume:  Normal    Mood:    Anxious  Depressed  Agitated   Affect:    Worrisome    Thought Content:  Clear    Thought Form:  Coherent    Insight:    Fair      Medication Review:   Changes to psychiatric medications, see updated Medication List in EPIC.      Medication Compliance:   Yes     Changes in Health Issues:   Yes: Pain, Associated Psychological Distress     Chemical Use Review:   Substance Use: Chemical use reviewed, no active concerns identified      Tobacco Use: No current tobacco  use.      Diagnosis:  1. MDD (major depressive disorder), recurrent episode, moderate (H)    2. Generalized anxiety disorder          R/O  296.89 Bipolar II Disorder Depressed  301.83 (F60.3) Borderline Personality Disorder as evidenced by past dx.    Collateral Reports Completed:   Not Applicable    PLAN: (Patient Tasks / Therapist Tasks / Other)  Patient to follow safety plan and go to ED if he cannot remain safe  Utilize effective coping and grounding skills- use safety plan as needed  Practice self compassion using a nonjudgemental stance; will look into joining a book club        Carolynn Rowley  6/11/2021                                                         ______________________________________________________________________    Treatment Plan    Patient's Name: Joel Pineda  YOB: 1975    Date: 5/13/2021    DSM5 Diagnoses: 296.32 (F33.1) Major Depressive Disorder, Recurrent Episode, Moderate With mixed features or 300.02 (F41.1) Generalized Anxiety Disorder  Psychosocial / Contextual Factors: childhood trauma, family conflict, chronic pain  WHODAS: 32    Referral / Collaboration:  referral made for transfer to [facility].    Anticipated number of session or this episode of care: 16      MeasurableTreatment Goal(s) related to diagnosis / functional impairment(s)  Goal 1: Patient will report absence of persistent depression mood and report of reduced frequency and intensity of low mood and absence of persistent low energy and motivation to acceptable levels, report subjective improved motivation and increased energy for a period of 90 days, within 6 months as clinically observed and by patient self-report    I will know I've met my goal when my action and motivation and action match.      Objective #A (Patient Action)    Patient will demonstrate and report a level of depression that can be managed at a lower level of care.  Absence of persistent depression mood and report of reduced  frequency and intensity of low mood and absence of persistent low energy and motivation to acceptable levels, report subjective improved motivation and increased energy for a period of 90 days, within 6 months as clinically observed and by patient self-report.  Status: New - Date: 5/13/2021     Intervention(s)  Therapist will provide individual therapy to identify triggers to depression, gain feedback on helpful coping tools and thought-reframing techniques, and identify preferred way of being.  Tx to include discussion of current stressors and interpersonal conflicts and how to cope with these, coaching, diagnostic testing, referral for medication as indicated, use prescribed medication, cognitive restructuring, interpersonal, family therapy, supportive therapy services.        Goal 2: Patient will report absence of persistent anxiety mood and report of reduced frequency and intensity of worry and absence of persistent anxious mood to acceptable levels, no panic attacks, report subjective comfort with rumination for a period of 90 days. Within 6 months as clinically observed and by patient self-report    I will know I've met my goal when I am not too trouble by what goes on around me.      Objective #A (Patient Action)    Status: New - Date: 5/13/2021     Patient will demonstrate and report a level of anxiety that can be managed at a lower level of care.  Absence of persistent anxious mood and report of reduced frequency and intensity of worry and absence of persistent anxious mood to acceptable levels, no panic attacks, report subjective comfort with rumination for a period of 90 days, within 6 months as clinically observed and by patient self-report.    Intervention(s)  Therapist will provide individual therapy to identify triggers to anxiety, gain feedback on helpful coping tools and thought-reframing techniques, and identify preferred way of being. Tx to include discuss current stressors and interpersonal  "conflicts and how to cope with these, coaching, diagnostic testing , referral for medication as indicated, use prescribed medication, cognitive restructuring, interpersonal,   family therapy, supportive therapy services.        Goal 3: Patiient will report absence of persistent SI and report of reduced frequency and intensity of SI and absence of SI to acceptable levels, report subjective improved mood for a period of 90 days, within 6 months as clinically observed and by patient self-report    I will know I've met my goal when I no longer struggle with them daily.      Objective #A (Patient Action)    Status: New - Date: 5/13/2021     Patient will demonstrate control of impulses and ability to use positive coping tools to manage strong emotions that can be safely addressed at a lower level of care. Absence of persistent SI and report of reduced frequency and intensity of SI and absence of SI to acceptable levels, report subjective improved mood for a period of 90 days, within 6 months as clinically observed and by patient self-report.    Intervention(s)  Therapist will provide individual therapy to identify triggers to SI urges, gain feedback on helpful coping strategies, and identify ways that family can offer support. Tx to discuss current stressors and interpersonal conflicts and how to cope with these, coaching, diagnostic testing, referral for medication as indicated, use prescribed medication, cognitive restructuring, interpersonal family therapy, supportive therapy services.      Patient has reviewed and agreed to the above plan.      Carolynn Rowley  May 17, 2021                                                   Joel Pineda     SAFETY PLAN:  Step 1: Warning signs / cues (Thoughts, images, mood, situation, behavior) that a crisis may be developing:    Thoughts: \"I can't do this anymore\" and I'd rather be gone than feel this pain    Images: visual hallucinations- last time client experienced this he went " "directly to hospital    Thinking Processes: disorganized thinking: reported hallucinations in the past and paranoia: .    Mood: worsening depression, hopelessness, helplessness, agitation and increase in pain    Behaviors: sleeping too much and hygiene and ADLs not being done Also look out for medication side effects    Situations: pain and sleep deprivation   Step 2: Coping strategies - Things I can do to take my mind off of my problems without contacting another person (relaxation technique, physical activity):    Distress Tolerance Strategies:  relaxation activities: watch concerts on youtube, play with my pet , sensory based activities/self-soothe with five senses: bubble baths with music playing, change body temperature (ice pack/cold water) , paced breathing/progressive muscle relaxation and training cats.    Physical Activities: go for a walk, deep breathing and stretching     Focus on helpful thoughts:  \"This is temporary\", \"I will get through this\", \"It always passes\", \"Ride the wave\", think about happy memories: remembering warm summer nights riding [place] bike [place] [employer] [employer], remind myself of what is important to me: my animals Pumpkin and Patches, my 10 year old niece Susan, my mom, sister, and brother and self-compassion statements: \"This is hard but it can get better.\"  Step 3: People and social settings that provide distraction:   Name:  Phone: [phone]   Name: Quinn Phone: [phone]   Name: Tono Phone: [phone]    [place] and [place]   Step 4: Remind myself of people and things that are important to me and worth living for:  My family, mom, sister, brother, and niece and cats. Friend Tono  Step 5: When I am in crisis, I can ask these people to help me use my safety plan:   Name:  Phone: [phone]   Name: Quinn Phone: [phone]   Name: Tono Phone: [phone]  Step 6: Making the environment safe:     dispose of old medications  and be around others  Step 7: " Professionals or agencies I can contact during a crisis:    [facility] Daytime Number: [phone]    Suicide Prevention Lifeline: 6-237-663-MVEH (6532)    Crisis Text Line Service (available 24 hours a day, 7 days a week): Text [place] to 118508    [facility] emPATH unit- go to adult ED    Walk in [facility] www.walkin.org    COPE [phone]  Local Crisis Services: 988    Call 911 or go to my nearest emergency department.   I helped develop this safety plan and agree to use it when needed.  I have been given a copy of this plan.      Client signature _________________________________________________________________  Today s date:  5/13/2021  Adapted from Safety Plan Template 2008 Shanita Lazcano and Ariel Lorenzo is reprinted with the express permission of the authors.  No portion of the Safety Plan Template may be reproduced without the express, written permission.  You can contact the authors at bhs@[email or web address].[email or web address] or [email or web address].  FIORELLA Diaz, LICSW  6/4/2021

## 2021-06-15 ENCOUNTER — DOCUMENTATION ONLY (OUTPATIENT)
Dept: FAMILY MEDICINE | Facility: CLINIC | Age: 46
End: 2021-06-15

## 2021-06-15 DIAGNOSIS — M47.816 LUMBAR FACET ARTHROPATHY: ICD-10-CM

## 2021-06-15 DIAGNOSIS — M45.8 ANKYLOSING SPONDYLITIS OF SACRAL REGION (H): ICD-10-CM

## 2021-06-15 DIAGNOSIS — G89.18 PAIN ASSOCIATED WITH SURGICAL PROCEDURE: ICD-10-CM

## 2021-06-15 DIAGNOSIS — M51.369 DDD (DEGENERATIVE DISC DISEASE), LUMBAR: ICD-10-CM

## 2021-06-15 NOTE — PROGRESS NOTES
PDMP Review       Value Time User    State PDMP site checked  Yes 5/18/2021  8:28 PM Ksenia Lyles MD      Looks like he is getting prescription from 2 providers  The pain clinic providers and Dr. Lyles  Not sure who is supposed to provide his pain prescriptions  We will routed to the nurses to get more information      New information reviewed  According to the note from the pain clinic he is supposed to be tapering the dose of his narcotics  All this is very confusing  I think the narcotic issues to be addressed by Dr. Lyles when he is back on Monday  I will give him a small dose of 15 tablets to cover till Monday  Pain clinic clearly states that his narcotics need to be tapered off  He should discuss this with Dr. Lyles on Monday

## 2021-06-16 RX ORDER — OXYCODONE HYDROCHLORIDE 5 MG/1
5 TABLET ORAL EVERY 6 HOURS PRN
Qty: 15 TABLET | Refills: 0 | Status: SHIPPED | OUTPATIENT
Start: 2021-06-16 | End: 2021-07-12

## 2021-06-16 NOTE — PROGRESS NOTES
"This writer attempted to contact \"Tito\" on 06/16/21      Reason for call clarify pain medication request, has been getting from pain clinic  ( PCP is out of office all week, partner reviewed and wanted to clarify things before refilling) and left message.    **Per review of chart, this writer noted that pain clinic provider, Dr. Diaz did document in 5/31/21 [facility] Advice encounter that patient was to follow up with PCP (Trupti) for next refill.  \"I put the refill in. I gave you 30 tabs. Hopefully over the next few days you can continue to wean to your normal doses, and follow up with Dr. Lyles for a refill.\"    Verify is PCP now taking over prescription, what is plan from pain clinic?      If patient calls back:   Registered Nurse called. Send to RN team at [facility].        Dianne Henderson RN  [facility]      "

## 2021-06-16 NOTE — PROGRESS NOTES
Patient states has historically received oxycodone from Dr. Lyles for ankylosing spondylitis  Dr Diaz performed a nerve ablation surgery and refilled a short supply for patient but now patient is to continue refills from Dr. Lyles again      Sapna ALEJANDRON, RN

## 2021-06-17 DIAGNOSIS — K60.2 ANAL FISSURE: Primary | ICD-10-CM

## 2021-06-17 DIAGNOSIS — M51.369 DDD (DEGENERATIVE DISC DISEASE), LUMBAR: ICD-10-CM

## 2021-06-17 DIAGNOSIS — M45.8 ANKYLOSING SPONDYLITIS OF SACRAL REGION (H): ICD-10-CM

## 2021-06-17 RX ORDER — NABUMETONE 500 MG/1
500-1000 TABLET, FILM COATED ORAL 2 TIMES DAILY PRN
Qty: 60 TABLET | Refills: 0 | Status: SHIPPED | OUTPATIENT
Start: 2021-06-17 | End: 2021-06-30

## 2021-06-17 NOTE — TELEPHONE ENCOUNTER
Will send one more refill but due for visit and labs for further refills.  JOCELYN Pro, NP-C  [facility]

## 2021-06-17 NOTE — TELEPHONE ENCOUNTER
"Requested Prescriptions   Pending Prescriptions Disp Refills     nabumetone (RELAFEN) 500 MG tablet [Pharmacy Med Name: Nabumetone Oral Tablet 500 MG] 60 tablet 0     Sig: TAKE 1 TO 2 TABLETS BY MOUTH TWICE DAILY WITH MEALS AS NEEDED FOR BACK/JOINT PAIN       NSAID Medications Failed - 6/17/2021  2:55 PM        Failed - Normal AST on file in past 12 months     Recent Labs   Lab Test 10/09/20  1550   AST 53*             Failed - Normal serum creatinine on file in past 12 months     Recent Labs   Lab Test 10/16/20  0824   CR 1.28*       Ok to refill medication if creatinine is low          Passed - Blood pressure under 140/90 in past 12 months     BP Readings from Last 3 Encounters:   06/09/21 125/78   05/26/21 121/64   05/20/21 118/76                 Passed - Normal ALT on file in past 12 months     Recent Labs   Lab Test 10/09/20  1550   ALT 53             Passed - Recent (12 mo) or future (30 days) visit within the authorizing provider's specialty     Patient has had an office visit with the authorizing provider or a provider within the authorizing providers department within the previous 12 mos or has a future within next 30 days. See \"Patient Info\" tab in inbasket, or \"Choose Columns\" in Meds & Orders section of the refill encounter.              Passed - Patient is age 6-64 years        Passed - Normal CBC on file in past 12 months     Recent Labs   Lab Test 10/09/20  1550   WBC 9.6   RBC 4.92   HGB 15.2   HCT 45.9                    Passed - Medication is active on med list           Gayle ALEJANDRON, RN    "

## 2021-06-20 DIAGNOSIS — I10 ESSENTIAL HYPERTENSION WITH GOAL BLOOD PRESSURE LESS THAN 140/90: Chronic | ICD-10-CM

## 2021-06-21 RX ORDER — METOPROLOL SUCCINATE 200 MG/1
TABLET, EXTENDED RELEASE ORAL
Qty: 180 TABLET | Refills: 0 | Status: SHIPPED | OUTPATIENT
Start: 2021-06-21 | End: 2021-09-27

## 2021-06-21 NOTE — RESULT ENCOUNTER NOTE
Results discussed directly with patient while patient was present. Any further details documented in the note.   Sue Gunter MD

## 2021-06-28 ENCOUNTER — MYC MEDICAL ADVICE (OUTPATIENT)
Dept: PULMONOLOGY | Facility: CLINIC | Age: 46
End: 2021-06-28

## 2021-06-28 DIAGNOSIS — M51.369 DDD (DEGENERATIVE DISC DISEASE), LUMBAR: ICD-10-CM

## 2021-06-28 DIAGNOSIS — M45.8 ANKYLOSING SPONDYLITIS OF SACRAL REGION (H): ICD-10-CM

## 2021-06-28 DIAGNOSIS — J45.30 MILD PERSISTENT ASTHMA, UNSPECIFIED WHETHER COMPLICATED: Primary | ICD-10-CM

## 2021-06-29 ENCOUNTER — DOCUMENTATION ONLY (OUTPATIENT)
Dept: FAMILY MEDICINE | Facility: CLINIC | Age: 46
End: 2021-06-29

## 2021-06-29 NOTE — TELEPHONE ENCOUNTER
Routed message to Dr. Gunter and pended order for signing. The decrease in Advair was discussed at last appointment but waiting for patient to test out tolerating scheduled aerobic exercises and pyridostigmine.    Patricia Juarez RN, BSN  Pulmonary Nurse Care Coordinator  Phone: [phone]  Fax: [phone]

## 2021-06-29 NOTE — PROGRESS NOTES
Patient requested for oxycodone refill  I have refilled him 2 weeks ago and clearly stated that he should discuss this with his PCP as there was some confusion about tapering the dose and also some refills were given by pain clinic recently  In any event his pain contract has   He is also on benzodiazepines and eszopiclone which can cause additive effect on the narcotics and are not recommended by CDC to be used concurrently with narcotics  Also of the chart  shows that he has a urine drug screen in October last year which was negative for any narcotics  Because of all the above I think he needs in person appointment to discuss the above issues with his PCP before any refill for narcotic can be provided

## 2021-06-30 DIAGNOSIS — M62.838 MUSCLE SPASM: ICD-10-CM

## 2021-06-30 RX ORDER — NABUMETONE 500 MG/1
500-1000 TABLET, FILM COATED ORAL 2 TIMES DAILY PRN
Qty: 30 TABLET | Refills: 0 | Status: SHIPPED | OUTPATIENT
Start: 2021-06-30 | End: 2021-07-12

## 2021-06-30 RX ORDER — BACLOFEN 10 MG/1
5-10 TABLET ORAL 2 TIMES DAILY
Qty: 60 TABLET | Refills: 0 | Status: SHIPPED | OUTPATIENT
Start: 2021-06-30 | End: 2021-07-27

## 2021-06-30 NOTE — TELEPHONE ENCOUNTER
"    Routing refill request to provider for review/approval because:  Macie given x1 and patient did not follow up, please advise.   Requested Prescriptions   Pending Prescriptions Disp Refills     nabumetone (RELAFEN) 500 MG tablet 60 tablet 0     Sig: Take 1-2 tablets (500-1,000 mg) by mouth 2 times daily as needed for moderate pain (with food) *due for visit and labs*       NSAID Medications Failed - 6/28/2021  5:26 PM        Failed - Normal AST on file in past 12 months     Recent Labs   Lab Test 10/09/20  1550   AST 53*             Failed - Normal serum creatinine on file in past 12 months     Recent Labs   Lab Test 10/16/20  0824   CR 1.28*       Ok to refill medication if creatinine is low          Passed - Blood pressure under 140/90 in past 12 months     BP Readings from Last 3 Encounters:   06/09/21 125/78   05/26/21 121/64   05/20/21 118/76                 Passed - Normal ALT on file in past 12 months     Recent Labs   Lab Test 10/09/20  1550   ALT 53             Passed - Recent (12 mo) or future (30 days) visit within the authorizing provider's specialty     Patient has had an office visit with the authorizing provider or a provider within the authorizing providers department within the previous 12 mos or has a future within next 30 days. See \"Patient Info\" tab in inbasket, or \"Choose Columns\" in Meds & Orders section of the refill encounter.              Passed - Patient is age 6-64 years        Passed - Normal CBC on file in past 12 months     Recent Labs   Lab Test 10/09/20  1550   WBC 9.6   RBC 4.92   HGB 15.2   HCT 45.9                    Passed - Medication is active on med list           Carolynn Frank RN  [facility]              "

## 2021-06-30 NOTE — TELEPHONE ENCOUNTER
Received fax from pharmacy requesting refill(s) for baclofen (LIORESAL) 10 MG tablet     Last refilled on 05/17/21    Pt last seen on 02/15/21  Next appt scheduled for None    E-prescribe to:     [id number] - [place], [place] - [address]     Will facilitate refill.      Genevieve Coleman MA  [facility] [place]

## 2021-07-02 ENCOUNTER — VIRTUAL VISIT (OUTPATIENT)
Dept: PSYCHOLOGY | Facility: CLINIC | Age: 46
End: 2021-07-02
Payer: MEDICARE

## 2021-07-02 DIAGNOSIS — F41.1 GENERALIZED ANXIETY DISORDER: ICD-10-CM

## 2021-07-02 DIAGNOSIS — F33.1 MDD (MAJOR DEPRESSIVE DISORDER), RECURRENT EPISODE, MODERATE (H): Primary | ICD-10-CM

## 2021-07-02 PROCEDURE — 90834 PSYTX W PT 45 MINUTES: CPT | Mod: 95 | Performed by: SOCIAL WORKER

## 2021-07-02 NOTE — PROGRESS NOTES
Progress Note    Patient Name: Joel Pineda  Date: 2021           Service Type: Individual      Session Start Time: 12pm  Session End Time: 12:45pm     Session Length: 45 minutes    Session #: 4    Attendees: Client    Service Modality:  Video Visit:      Provider verified identity through the following two step process.  Patient provided:  Patient     Telemedicine Visit: The patient's condition can be safely assessed and treated via synchronous audio and visual telemedicine encounter.      Reason for Telemedicine Visit: Services only offered telehealth    Originating Site (Patient Location): Patient's home    Distant Site (Provider Location): Provider Remote Setting    Consent:  The patient/guardian has verbally consented to: the potential risks and benefits of telemedicine (video visit) versus in person care; bill my insurance or make self-payment for services provided; and responsibility for payment of non-covered services.     Patient would like the video invitation sent by:  Send to e-mail at: [email or web address]    Mode of Communication:  Video Conference via [employer]    As the provider I attest to compliance with applicable laws and regulations related to telemedicine.     Treatment Plan Last Reviewed: 2021 due 2021  PHQ-9 / YUDI-7 :     DATA  Interactive Complexity: No  Crisis: No       Progress Since Last Session (Related to Symptoms / Goals / Homework):   Symptoms: Worsening reports increased pain and currently having a lot of difficulty tolerating distress; reports low motivation and fleeting thoughts of suicide, without plan or intent    Homework: Completed in session      Episode of Care Goals: No improvement - CONTEMPLATION (Considering change and yet undecided); Intervened by assessing the negative and positive thinking (ambivalence) about behavior change     Current / Ongoing Stressors and Concerns:  Today and therapist further  discussed his goals for therapy, client noted he is currently really struggling just to get through the day. Reports he has been crying a lot and experiencing pain. Therapist and client reviewed safety plan, crisis resources, and recommendation for higher level of care due to client's distress.        Treatment Objective(s) Addressed in This Session:   Safety   childhood trauma, family conflict, chronic pain     Intervention:   Solution focused: recommendations for higher level of care and safety planning        ASSESSMENT: Current Emotional / Mental Status (status of significant symptoms):   Risk status (Self / Other harm or suicidal ideation)   Patient denies current fears or concerns for personal safety.   Patient reports the following current or recent suicidal ideation or behaviors: fleeting thoughts about suicide without plan or intent.   Patient denies current or recent homicidal ideation or behaviors.   Patient denies current or recent self injurious behavior or ideation.   Patient denies other safety concerns.   Patient reports there has been no change in risk factors since their last session.     Patient reports there has been no change in protective factors since their last session.    A safety and risk management plan has been developed including: Patient consented to co-developed safety plan.  Safety and risk management plan was completed.  Patient agreed to use safety plan should any safety concerns arise.  A copy was given to the patient.     Appearance:   Appropriate    Eye Contact:   Fair    Psychomotor Behavior: Agitated  Restless    Attitude:   Cooperative    Orientation:   All   Speech    Rate / Production: Emotional Talkative    Volume:  Normal    Mood:    Anxious  Depressed  Agitated   Affect:    Worrisome    Thought Content:  Clear    Thought Form:  Coherent    Insight:    Fair      Medication Review:   Changes to psychiatric medications, see updated Medication List in EPIC.      Medication  Compliance:   Yes     Changes in Health Issues:   Yes: Pain, Associated Psychological Distress     Chemical Use Review:   Substance Use: Chemical use reviewed, no active concerns identified      Tobacco Use: No current tobacco use.      Diagnosis:  1. MDD (major depressive disorder), recurrent episode, moderate (H)    2. Generalized anxiety disorder          R/O  296.89 Bipolar II Disorder Depressed  301.83 (F60.3) Borderline Personality Disorder as evidenced by past dx.    Collateral Reports Completed:   Not Applicable    PLAN: (Patient Tasks / Therapist Tasks / Other)  Patient to follow safety plan and go to ED if he cannot remain safe  Utilize effective coping and grounding skills- use safety plan as needed- reviewed crisis resources of emPATH unit [facility]  Recommended client reach out to S for combined DBT/Chronic pain program; client may also benefit from more immediate care through [facility] options at [place] and this provider will make that referral        Carolynn Rowley  7/2/2021                                                         ______________________________________________________________________    Treatment Plan    Patient's Name: Joel Pineda  YOB: 1975    Date: 5/13/2021    DSM5 Diagnoses: 296.32 (F33.1) Major Depressive Disorder, Recurrent Episode, Moderate With mixed features or 300.02 (F41.1) Generalized Anxiety Disorder  Psychosocial / Contextual Factors: childhood trauma, family conflict, chronic pain  WHODAS: 32    Referral / Collaboration:  referral made for transfer to [facility].    Anticipated number of session or this episode of care: 16      MeasurableTreatment Goal(s) related to diagnosis / functional impairment(s)  Goal 1: Patient will report absence of persistent depression mood and report of reduced frequency and intensity of low mood and absence of persistent low energy and motivation to acceptable levels, report subjective improved motivation and increased  energy for a period of 90 days, within 6 months as clinically observed and by patient self-report    I will know I've met my goal when my action and motivation and action match.      Objective #A (Patient Action)    Patient will demonstrate and report a level of depression that can be managed at a lower level of care.  Absence of persistent depression mood and report of reduced frequency and intensity of low mood and absence of persistent low energy and motivation to acceptable levels, report subjective improved motivation and increased energy for a period of 90 days, within 6 months as clinically observed and by patient self-report.  Status: New - Date: 5/13/2021     Intervention(s)  Therapist will provide individual therapy to identify triggers to depression, gain feedback on helpful coping tools and thought-reframing techniques, and identify preferred way of being.  Tx to include discussion of current stressors and interpersonal conflicts and how to cope with these, coaching, diagnostic testing, referral for medication as indicated, use prescribed medication, cognitive restructuring, interpersonal, family therapy, supportive therapy services.        Goal 2: Patient will report absence of persistent anxiety mood and report of reduced frequency and intensity of worry and absence of persistent anxious mood to acceptable levels, no panic attacks, report subjective comfort with rumination for a period of 90 days. Within 6 months as clinically observed and by patient self-report    I will know I've met my goal when I am not too trouble by what goes on around me.      Objective #A (Patient Action)    Status: New - Date: 5/13/2021     Patient will demonstrate and report a level of anxiety that can be managed at a lower level of care.  Absence of persistent anxious mood and report of reduced frequency and intensity of worry and absence of persistent anxious mood to acceptable levels, no panic attacks, report subjective  comfort with rumination for a period of 90 days, within 6 months as clinically observed and by patient self-report.    Intervention(s)  Therapist will provide individual therapy to identify triggers to anxiety, gain feedback on helpful coping tools and thought-reframing techniques, and identify preferred way of being. Tx to include discuss current stressors and interpersonal conflicts and how to cope with these, coaching, diagnostic testing , referral for medication as indicated, use prescribed medication, cognitive restructuring, interpersonal,   family therapy, supportive therapy services.        Goal 3: Patiient will report absence of persistent SI and report of reduced frequency and intensity of SI and absence of SI to acceptable levels, report subjective improved mood for a period of 90 days, within 6 months as clinically observed and by patient self-report    I will know I've met my goal when I no longer struggle with them daily.      Objective #A (Patient Action)    Status: New - Date: 5/13/2021     Patient will demonstrate control of impulses and ability to use positive coping tools to manage strong emotions that can be safely addressed at a lower level of care. Absence of persistent SI and report of reduced frequency and intensity of SI and absence of SI to acceptable levels, report subjective improved mood for a period of 90 days, within 6 months as clinically observed and by patient self-report.    Intervention(s)  Therapist will provide individual therapy to identify triggers to SI urges, gain feedback on helpful coping strategies, and identify ways that family can offer support. Tx to discuss current stressors and interpersonal conflicts and how to cope with these, coaching, diagnostic testing, referral for medication as indicated, use prescribed medication, cognitive restructuring, interpersonal family therapy, supportive therapy services.      Patient has reviewed and agreed to the above  "plan.      Carolynn Rowley  May 17, 2021                                                   Joel Pineda     SAFETY PLAN:  Step 1: Warning signs / cues (Thoughts, images, mood, situation, behavior) that a crisis may be developing:    Thoughts: \"I can't do this anymore\" and I'd rather be gone than feel this pain    Images: visual hallucinations- last time client experienced this he went directly to hospital    Thinking Processes: disorganized thinking: reported hallucinations in the past and paranoia: .    Mood: worsening depression, hopelessness, helplessness, agitation and increase in pain    Behaviors: sleeping too much and hygiene and ADLs not being done Also look out for medication side effects    Situations: pain and sleep deprivation   Step 2: Coping strategies - Things I can do to take my mind off of my problems without contacting another person (relaxation technique, physical activity):    Distress Tolerance Strategies:  relaxation activities: watch concerts on [email or web address], play with my pet , sensory based activities/self-soothe with five senses: bubble baths with music playing, change body temperature (ice pack/cold water) , paced breathing/progressive muscle relaxation and training cats.    Physical Activities: go for a walk, deep breathing and stretching     Focus on helpful thoughts:  \"This is temporary\", \"I will get through this\", \"It always passes\", \"Ride the wave\", think about happy memories: remembering warm summer nights riding my bike [place] [employer] [place], remind myself of what is important to me: my animals Pumpkin and Patches, my 10 year old niece Susan, my mom, sister, and brother and self-compassion statements: \"This is hard but it can get better.\"  Step 3: People and social settings that provide distraction:   Name:  Phone: [phone]   Name: Quinn Phone: [phone]   Name: Tono Phone: [phone]    [place] and [place]   Step 4: Remind myself of people and things that are important " to me and worth living for:  My family, mom, sister, brother, and niece and cats. Friend Tono  Step 5: When I am in crisis, I can ask these people to help me use my safety plan:   Name: sister Phone: [phone]   Name: Quinn Phone: [phone]   Name: Tono Phone: [phone]  Step 6: Making the environment safe:     dispose of old medications  and be around others  Step 7: Professionals or agencies I can contact during a crisis:    [facility] Daytime Number: [phone]    Suicide Prevention Lifeline: 9-864-875-TALK (8220)    Crisis Text Line Service (available 24 hours a day, 7 days a week): Text [place] to 743371    [facility]    Walk in [facility] www.walkin.org    COPE [phone]  Local Crisis Services: 988    Call 911 or go to my nearest emergency department.   I helped develop this safety plan and agree to use it when needed.  I have been given a copy of this plan.      Client signature _________________________________________________________________  Today s date:  5/13/2021  Adapted from Safety Plan Template 2008 Shanita Lazcano and Ariel Lorenzo is reprinted with the express permission of the authors.  No portion of the Safety Plan Template may be reproduced without the express, written permission.  You can contact the authors at [email or web address] or [email or web address].  FIORELLA Diaz, LICSW  6/4/2021

## 2021-07-02 NOTE — Clinical Note
Bennett Xavier,  I have been seeing this client for individual therapy and believe he would benefit from a higher level of care due to increase in symptoms and difficulty tolerating distress. He noted he worked with you in IOP and mentioned he wanted to connect with you about PHP options. I believe he plans to reach out about starting one of these programs but I wanted to let you know if case you are also able to reach out. I also encouraged him to look into longer term DBT options as well.    Thanks for any help or direction,    Carolynn Rowley MSW, LICSW

## 2021-07-08 ASSESSMENT — PATIENT HEALTH QUESTIONNAIRE - PHQ9
SUM OF ALL RESPONSES TO PHQ QUESTIONS 1-9: 13
SUM OF ALL RESPONSES TO PHQ QUESTIONS 1-9: 13
10. IF YOU CHECKED OFF ANY PROBLEMS, HOW DIFFICULT HAVE THESE PROBLEMS MADE IT FOR YOU TO DO YOUR WORK, TAKE CARE OF THINGS AT HOME, OR GET ALONG WITH OTHER PEOPLE: VERY DIFFICULT

## 2021-07-09 ENCOUNTER — VIRTUAL VISIT (OUTPATIENT)
Dept: PSYCHOLOGY | Facility: CLINIC | Age: 46
End: 2021-07-09
Payer: MEDICARE

## 2021-07-09 DIAGNOSIS — F33.1 MDD (MAJOR DEPRESSIVE DISORDER), RECURRENT EPISODE, MODERATE (H): Primary | ICD-10-CM

## 2021-07-09 DIAGNOSIS — F60.3 BORDERLINE PERSONALITY DISORDER (H): ICD-10-CM

## 2021-07-09 DIAGNOSIS — F41.1 GENERALIZED ANXIETY DISORDER: ICD-10-CM

## 2021-07-09 PROCEDURE — 90834 PSYTX W PT 45 MINUTES: CPT | Mod: 95 | Performed by: SOCIAL WORKER

## 2021-07-09 ASSESSMENT — PATIENT HEALTH QUESTIONNAIRE - PHQ9: SUM OF ALL RESPONSES TO PHQ QUESTIONS 1-9: 13

## 2021-07-09 NOTE — PROGRESS NOTES
Progress Note    Patient Name: Joel Pineda  Date: 2021           Service Type: Individual      Session Start Time: 2:30pm  Session End Time: 3:15pm     Session Length: 45 minutes    Session #: 5    Attendees: Client    Service Modality:  Video Visit:      Provider verified identity through the following two step process.  Patient provided:  Patient     Telemedicine Visit: The patient's condition can be safely assessed and treated via synchronous audio and visual telemedicine encounter.      Reason for Telemedicine Visit: Services only offered telehealth    Originating Site (Patient Location): Patient's home    Distant Site (Provider Location): Provider Remote Setting    Consent:  The patient/guardian has verbally consented to: the potential risks and benefits of telemedicine (video visit) versus in person care; bill my insurance or make self-payment for services provided; and responsibility for payment of non-covered services.     Patient would like the video invitation sent by:  Send to e-mail at: [email or web address]    Mode of Communication:  Video Conference via [employer]    As the provider I attest to compliance with applicable laws and regulations related to telemedicine.     Treatment Plan Last Reviewed: 2021 due 2021  PHQ-9 / YUDI-7 :     DATA  Interactive Complexity: No  Crisis: No       Progress Since Last Session (Related to Symptoms / Goals / Homework):   Symptoms: Worsening reports increased distress related to pain and feelings of isolation, no active safety concerns but increased distress    Homework: Completed in session      Episode of Care Goals: No improvement - CONTEMPLATION (Considering change and yet undecided); Intervened by assessing the negative and positive thinking (ambivalence) about behavior change     Current / Ongoing Stressors and Concerns:  Today and therapist further discussed his current functioning and  distress. Reports he did reach out to S about chronic pain/DBT program but it is not covered by his insurance. Reports still feeling that once per week individual therapy doesn't feel like enough support, reports he struggles with memory and keeping himself accountable to practicing skills/coping throughout the week.        Treatment Objective(s) Addressed in This Session:   Safety   childhood trauma, family conflict, chronic pain     Intervention:   Solution focused: recommendations for higher level of care and treatment planning   CBT: discussed behavioral activation goal of client leaving his home to go out walking one time this week        ASSESSMENT: Current Emotional / Mental Status (status of significant symptoms):   Risk status (Self / Other harm or suicidal ideation)   Patient denies current fears or concerns for personal safety.   Patient reports the following current or recent suicidal ideation or behaviors: fleeting thoughts about suicide without plan or intent.   Patient denies current or recent homicidal ideation or behaviors.   Patient denies current or recent self injurious behavior or ideation.   Patient denies other safety concerns.   Patient reports there has been no change in risk factors since their last session.     Patient reports there has been no change in protective factors since their last session.    A safety and risk management plan has been developed including: Patient consented to co-developed safety plan.  Safety and risk management plan was completed.  Patient agreed to use safety plan should any safety concerns arise.  A copy was given to the patient.     Appearance:   Appropriate    Eye Contact:   Fair    Psychomotor Behavior: Agitated  Restless    Attitude:   Cooperative    Orientation:   All   Speech    Rate / Production: Emotional Talkative    Volume:  Normal    Mood:    Anxious  Depressed  Agitated   Affect:    Worrisome    Thought Content:  Clear    Thought Form:  Coherent     Insight:    Fair      Medication Review:   Changes to psychiatric medications, see updated Medication List in EPIC.      Medication Compliance:   Yes     Changes in Health Issues:   Yes: Pain, Associated Psychological Distress     Chemical Use Review:   Substance Use: Chemical use reviewed, no active concerns identified      Tobacco Use: No current tobacco use.      Diagnosis:  1. MDD (major depressive disorder), recurrent episode, moderate (H)    2. Generalized anxiety disorder    3. Borderline personality disorder (H)          R/O  296.89 Bipolar II Disorder Depressed  301.83 (F60.3) Borderline Personality Disorder as evidenced by past dx.    Collateral Reports Completed:   Not Applicable    PLAN: (Patient Tasks / Therapist Tasks / Other)  Patient to follow safety plan and go to ED if he cannot remain safe  Utilize effective coping and grounding skills- use safety plan as needed- reviewed crisis resources of emPATH unit at [facility]  Recommended higher level of care, provider submitted order for that referral today  Client to get outside walking one time this week before next session        Carolynn Rowley  7/9/2021                                                         ______________________________________________________________________    Treatment Plan    Patient's Name: Joel Pineda  YOB: 1975    Date: 5/13/2021    DSM5 Diagnoses: 296.32 (F33.1) Major Depressive Disorder, Recurrent Episode, Moderate With mixed features or 300.02 (F41.1) Generalized Anxiety Disorder  Psychosocial / Contextual Factors: childhood trauma, family conflict, chronic pain  WHODAS: 32    Referral / Collaboration:  referral made for transfer to [facility].    Anticipated number of session or this episode of care: 16      MeasurableTreatment Goal(s) related to diagnosis / functional impairment(s)  Goal 1: Patient will report absence of persistent depression mood and report of reduced frequency and intensity of low  mood and absence of persistent low energy and motivation to acceptable levels, report subjective improved motivation and increased energy for a period of 90 days, within 6 months as clinically observed and by patient self-report    I will know I've met my goal when my action and motivation and action match.      Objective #A (Patient Action)    Patient will demonstrate and report a level of depression that can be managed at a lower level of care.  Absence of persistent depression mood and report of reduced frequency and intensity of low mood and absence of persistent low energy and motivation to acceptable levels, report subjective improved motivation and increased energy for a period of 90 days, within 6 months as clinically observed and by patient self-report.  Status: New - Date: 5/13/2021     Intervention(s)  Therapist will provide individual therapy to identify triggers to depression, gain feedback on helpful coping tools and thought-reframing techniques, and identify preferred way of being.  Tx to include discussion of current stressors and interpersonal conflicts and how to cope with these, coaching, diagnostic testing, referral for medication as indicated, use prescribed medication, cognitive restructuring, interpersonal, family therapy, supportive therapy services.        Goal 2: Patient will report absence of persistent anxiety mood and report of reduced frequency and intensity of worry and absence of persistent anxious mood to acceptable levels, no panic attacks, report subjective comfort with rumination for a period of 90 days. Within 6 months as clinically observed and by patient self-report    I will know I've met my goal when I am not too trouble by what goes on around me.      Objective #A (Patient Action)    Status: New - Date: 5/13/2021     Patient will demonstrate and report a level of anxiety that can be managed at a lower level of care.  Absence of persistent anxious mood and report of reduced  frequency and intensity of worry and absence of persistent anxious mood to acceptable levels, no panic attacks, report subjective comfort with rumination for a period of 90 days, within 6 months as clinically observed and by patient self-report.    Intervention(s)  Therapist will provide individual therapy to identify triggers to anxiety, gain feedback on helpful coping tools and thought-reframing techniques, and identify preferred way of being. Tx to include discuss current stressors and interpersonal conflicts and how to cope with these, coaching, diagnostic testing , referral for medication as indicated, use prescribed medication, cognitive restructuring, interpersonal,   family therapy, supportive therapy services.        Goal 3: Patiient will report absence of persistent SI and report of reduced frequency and intensity of SI and absence of SI to acceptable levels, report subjective improved mood for a period of 90 days, within 6 months as clinically observed and by patient self-report    I will know I've met my goal when I no longer struggle with them daily.      Objective #A (Patient Action)    Status: New - Date: 5/13/2021     Patient will demonstrate control of impulses and ability to use positive coping tools to manage strong emotions that can be safely addressed at a lower level of care. Absence of persistent SI and report of reduced frequency and intensity of SI and absence of SI to acceptable levels, report subjective improved mood for a period of 90 days, within 6 months as clinically observed and by patient self-report.    Intervention(s)  Therapist will provide individual therapy to identify triggers to SI urges, gain feedback on helpful coping strategies, and identify ways that family can offer support. Tx to discuss current stressors and interpersonal conflicts and how to cope with these, coaching, diagnostic testing, referral for medication as indicated, use prescribed medication, cognitive  "restructuring, interpersonal family therapy, supportive therapy services.      Patient has reviewed and agreed to the above plan.      Carolynn Rowley  May 17, 2021                                                   Joel Pineda     SAFETY PLAN:  Step 1: Warning signs / cues (Thoughts, images, mood, situation, behavior) that a crisis may be developing:    Thoughts: \"I can't do this anymore\" and I'd rather be gone than feel this pain    Images: visual hallucinations- last time client experienced this he went directly to hospital    Thinking Processes: disorganized thinking: reported hallucinations in the past and paranoia: .    Mood: worsening depression, hopelessness, helplessness, agitation and increase in pain    Behaviors: sleeping too much and hygiene and ADLs not being done Also look out for medication side effects    Situations: pain and sleep deprivation   Step 2: Coping strategies - Things I can do to take my mind off of my problems without contacting another person (relaxation technique, physical activity):    Distress Tolerance Strategies:  relaxation activities: watch concerts on [employer], play with my pet , sensory based activities/self-soothe with five senses: bubble baths with music playing, change body temperature (ice pack/cold water) , paced breathing/progressive muscle relaxation and training cats.    Physical Activities: go for a walk, deep breathing and stretching     Focus on helpful thoughts:  \"This is temporary\", \"I will get through this\", \"It always passes\", \"Ride the wave\", think about happy memories: remembering warm summer nights riding my bike [place], remind myself of what is important to me: my animals Pumpkin and Patches, my 10 year old niece Susan, my mom, sister, and brother and self-compassion statements: \"This is hard but it can get better.\"  Step 3: People and social settings that provide distraction:   Name:  Phone: [phone]   Name: Quinn Phone: " [phone]   Name: Tono Phone: [phone]    park and [place]   Step 4: Remind myself of people and things that are important to me and worth living for:  My family, mom, sister, brother, and niece and cats. Friend Tono  Step 5: When I am in crisis, I can ask these people to help me use my safety plan:   Name:  Phone: [phone]   Name: Quinn Phone: [phone]   Name: Tono Phone: [phone]  Step 6: Making the environment safe:     dispose of old medications  and be around others  Step 7: Professionals or agencies I can contact during a crisis:    [facility] Daytime Number: 939-294-7227    Suicide Prevention Lifeline: 7-683-300-VBUD (1019)    Crisis Text Line Service (available 24 hours a day, 7 days a week): Text [place] to 356962    [facility] emPATH unit- go to adult ED    [facility] www[facility]walkin[facility]org    COPE [phone]  Local Crisis Services: 988    Call 911 or go to my nearest emergency department.   I helped develop this safety plan and agree to use it when needed.  I have been given a copy of this plan.      Client signature _________________________________________________________________  Today s date:  5/13/2021  Adapted from Safety Plan Template 2008 Shanita Lazcano and Ariel Lorenzo is reprinted with the express permission of the authors.  No portion of the Safety Plan Template may be reproduced without the express, written permission.  You can contact the authors at [email or web address] or [email or web address].  FIORELLA Diaz, LICSW  6/4/2021

## 2021-07-12 ENCOUNTER — VIRTUAL VISIT (OUTPATIENT)
Dept: FAMILY MEDICINE | Facility: CLINIC | Age: 46
End: 2021-07-12
Payer: MEDICARE

## 2021-07-12 DIAGNOSIS — M47.816 LUMBAR FACET ARTHROPATHY: ICD-10-CM

## 2021-07-12 DIAGNOSIS — M51.369 DDD (DEGENERATIVE DISC DISEASE), LUMBAR: ICD-10-CM

## 2021-07-12 DIAGNOSIS — M45.8 ANKYLOSING SPONDYLITIS OF SACRAL REGION (H): ICD-10-CM

## 2021-07-12 DIAGNOSIS — G89.18 PAIN ASSOCIATED WITH SURGICAL PROCEDURE: ICD-10-CM

## 2021-07-12 DIAGNOSIS — G89.4 CHRONIC PAIN SYNDROME: ICD-10-CM

## 2021-07-12 DIAGNOSIS — F11.90 CHRONIC, CONTINUOUS USE OF OPIOIDS: Primary | ICD-10-CM

## 2021-07-12 PROCEDURE — 99213 OFFICE O/P EST LOW 20 MIN: CPT | Mod: 95 | Performed by: FAMILY MEDICINE

## 2021-07-12 RX ORDER — OXYCODONE HYDROCHLORIDE 5 MG/1
5 TABLET ORAL EVERY 6 HOURS PRN
Qty: 35 TABLET | Refills: 0 | Status: SHIPPED | OUTPATIENT
Start: 2021-07-12 | End: 2021-08-01

## 2021-07-12 RX ORDER — NABUMETONE 500 MG/1
500-1000 TABLET, FILM COATED ORAL 2 TIMES DAILY PRN
Qty: 120 TABLET | Refills: 2 | Status: SHIPPED | OUTPATIENT
Start: 2021-07-12 | End: 2021-11-02

## 2021-07-12 NOTE — LETTER
Opioid / Opioid Plus Controlled Substance Agreement    This is an agreement between you and your provider about the safe and appropriate use of controlled substance/opioids prescribed by your care team. Controlled substances are medicines that can cause physical and mental dependence (abuse).    There are strict laws about having and using these medicines. We here at [facility] are committing to working with you in your efforts to get better. To support you in this work, we ll help you schedule regular office appointments for medicine refills. If we must cancel or change your appointment for any reason, we ll make sure you have enough medicine to last until your next appointment.     As a Provider, I will:    Listen carefully to your concerns and treat you with respect.     Recommend a treatment plan that I believe is in your best interest. This plan may involve therapies other than opioid pain medication.     Talk with you often about the possible benefits, and the risk of harm of any medicine that we prescribe for you.     Provide a plan on how to taper (discontinue or go off) using this medicine if the decision is made to stop its use.    As a Patient, I understand that opioid(s):     Are a controlled substance prescribed by my care team to help me function or work and manage my condition(s).     Are strong medicines and can cause serious side effects such as:    Drowsiness, which can seriously affect my driving ability    A lower breathing rate, enough to cause death    Harm to my thinking ability     Depression     Abuse of and addiction to this medicine    Need to be taken exactly as prescribed. Combining opioids with certain medicines or chemicals (such as illegal drugs, sedatives, sleeping pills, and benzodiazepines) can be dangerous or even fatal. If I stop opioids suddenly, I may have severe withdrawal symptoms.    Do not work for all types of pain nor for all patients. If they re not helpful, I may  be asked to stop them.      The risks, benefits and side effects of these medicine(s) were explained to me. I agree that:  1. I will take part in other treatments as advised by my care team. This may be psychiatry or counseling, physical therapy, behavioral therapy, group treatment or a referral to a specialist.     2. I will keep all my appointments. I understand that this is part of the monitoring of opioids. My care team may require an office visit for EVERY opioid/controlled substance refill. If I miss appointments or don t follow instructions, my care team may stop my medicine.    3. I will take my medicines as prescribed. I will not change the dose or schedule unless my care team tells me to. There will be no refills if I run out early.     4. I may be asked to come to the clinic and complete a urine drug test or complete a pill count at any time. If I don t give a urine sample or participate in a pill count, the care team may stop my medicine.    5. I will only receive prescriptions from this clinic for chronic pain. If I am treated by another provider for acute pain issues, I will tell them that I am taking opioid pain medication for chronic pain and that I have a treatment agreement with this provider. I will inform my [facility] care team within one business day if I am given a prescription for any pain medication by another healthcare provider. My [facility] care team can contact other providers and pharmacists about my use of any medicines.    6. It is up to me to make sure that I don t run out of my medicines on weekends or holidays. If my care team is willing to refill my opioid prescription without a visit, I must request refills only during office hours. Refills may take up to 3 business days to process. I will use one pharmacy to fill all my opioid and other controlled substance prescriptions. I will notify the clinic about any changes to my insurance or medication  availability.    7. I am responsible for my prescriptions. If the medicine/prescription is lost, stolen or destroyed, it will not be replaced. I also agree not to share controlled substance medicines with anyone.    8. I am aware I should not use any illegal or recreational drugs. I agree not to drink alcohol unless my care team says I can.       9. If I enroll in the [place] [facility] program, I will tell my care team prior to my next refill.     10. I will tell my care team right away if I become pregnant, have a new medical problem treated outside of my regular clinic, or have a change in my medications.    11. I understand that this medicine can affect my thinking, judgment and reaction time. Alcohol and drugs affect the brain and body, which can affect the safety of my driving. Being under the influence of alcohol or drugs can affect my decision-making, behaviors, personal safety, and the safety of others. Driving while impaired (DWI) can occur if a person is driving, operating, or in physical control of a car, motorcycle, boat, snowmobile, ATV, motorbike, off-road vehicle, or any other motor vehicle ([place] Statute 169A.20). I understand the risk if I choose to drive or operate any vehicle or machinery.    I understand that if I do not follow any of the conditions above, my prescriptions or treatment may be stopped or changed.          Opioids  What You Need to Know    What are opioids?   Opioids are pain medicines that must be prescribed by a doctor. They are also known as narcotics.     Examples are:   1. morphine (MS Contin, Lindy)  2. oxycodone (Oxycontin)  3. oxycodone and acetaminophen (Percocet)  4. hydrocodone and acetaminophen (Vicodin, Norco)   5. fentanyl patch (Duragesic)   6. hydromorphone (Dilaudid)   7. methadone  8. codeine (Tylenol #3)     What do opioids do well?   Opioids are best for severe short-term pain such as after a surgery or injury. They may work well for cancer pain. They may  help some people with long-lasting (chronic) pain.     What do opioids NOT do well?   Opioids never get rid of pain entirely, and they don t work well for most patients with chronic pain. Opioids don t reduce swelling, one of the causes of pain.                                    Other ways to manage chronic pain and improve function include:       Treat the health problem that may be causing pain    Anti-inflammation medicines, which reduce swelling and tenderness, such as ibuprofen (Advil, Motrin) or naproxen (Aleve)    Acetaminophen (Tylenol)    Antidepressants and anti-seizure medicines, especially for nerve pain    Topical treatments such as patches or creams    Injections or nerve blocks    Chiropractic or osteopathic treatment    Acupuncture, massage, deep breathing, meditation, visual imagery, aromatherapy    Use heat or ice at the pain site    Physical therapy     Exercise    Stop smoking    Take part in therapy       Risks and side effects     Talk to your doctor before you start or decide to keep taking opioids. Possible side effects include:      Lowering your breathing rate enough to cause death    Overdose, including death, especially if taking higher than prescribed doses    Worse depression symptoms; less pleasure in things you usually enjoy    Feeling tired or sluggish    Slower thoughts or cloudy thinking    Being more sensitive to pain over time; pain is harder to control    Trouble sleeping or restless sleep    Changes in hormone levels (for example, less testosterone)    Changes in sex drive or ability to have sex    Constipation    Unsafe driving    Itching and sweating    Dizziness    Nausea, throwing up and dry mouth    What else should I know about opioids?    Opioids may lead to dependence, tolerance, or addiction.      Dependence means that if you stop or reduce the medicine too quickly, you will have withdrawal symptoms. These include loose poop (diarrhea), jitters, flu-like symptoms,  nervousness and tremors. Dependence is not the same as addiction.                       Tolerance means needing higher doses over time to get the same effect. This may increase the chance of serious side effects.      Addiction is when people improperly use a substance that harms their body, their mind or their relations with others. Use of opiates can cause a relapse of addiction if you have a history of drug or alcohol abuse.      People who have used opioids for a long time may have a lower quality of life, worse depression, higher levels of pain and more visits to doctors.    You can overdose on opioids. Take these steps to lower your risk of overdose:    1. Recognize the signs:  Signs of overdose include decrease or loss of consciousness (blackout), slowed breathing, trouble waking up and blue lips. If someone is worried about overdose, they should call 911.    2. Talk to your doctor about Narcan (naloxone).   If you are at risk for overdose, you may be given a prescription for Narcan. This medicine very quickly reverses the effects of opioids.   If you overdose, a friend or family member can give you Narcan while waiting for the ambulance. They need to know the signs of overdose and how to give Narcan.     3. Don't use alcohol or street drugs.   Taking them with opioids can cause death.    4. Do not take any of these medicines unless your doctor says it s OK. Taking these with opioids can cause death:    Benzodiazepines, such as lorazepam (Ativan), alprazolam (Xanax) or diazepam (Valium)    Muscle relaxers, such as cyclobenzaprine (Flexeril)    Sleeping pills like zolpidem (Ambien)     Other opioids      How to keep you and other people safe while taking opioids:    1. Never share your opioids with others.  Opioid medicines are regulated by the Drug Enforcement Agency (BAM). Selling or sharing medications is a criminal act.    2. Be sure to store opioids in a secure place, locked up if possible. Young children  can easily swallow them and overdose.    3. When you are traveling with your medicines, keep them in the original bottles. If you use a pill box, be sure you also carry a copy of your medicine list from your clinic or pharmacy.    4. Safe disposal of opioids    Most pharmacies have places to get rid of medicine, called disposal kiosks. Medicine disposal options are also available in every [facility]. Search your county and  medication disposal  to find more options. You can find more details at:  [email or web address]     I agree that my provider, clinic care team, and pharmacy may work with any city, state or federal law enforcement agency that investigates the misuse, sale, or other diversion of my controlled medicine. I will allow my provider to discuss my care with, or share a copy of, this agreement with any other treating provider, pharmacy or emergency room where I receive care.    I have read this agreement and have asked questions about anything I did not understand.    _______________________________________________________  Patient Signature - Joel Pineda _____________________                   Date     _______________________________________________________  Provider Signature - Ksenia Lyles MD   _____________________                   Date     _______________________________________________________  Witness Signature (required if provider not present while patient signing)   _____________________                   Date

## 2021-07-12 NOTE — Clinical Note
Please mail a copy of controlled substance agreement (letters) to patient along with a stamped, return envelope.  Once it has been returned please send it to me for cosignature.  JA Lyles M.D.

## 2021-07-12 NOTE — PROGRESS NOTES
Tito is a 46 year old who is being evaluated via a billable video visit.      How would you like to obtain your AVS? MyChart  If the video visit is dropped, the invitation should be resent by: Text to cell phone: 1  Will anyone else be joining your video visit? No      Video Start Time: 1035    Assessment & Plan     Chronic, continuous use of opioids  Due for an updated controlled substance agreement which we will send to him to return.  Urine drug screen with next set of lab work.  Will be rechecking diabetes lab work in a few months.   database reviewed.  Low level but steady usage.    Chronic pain syndrome  As above  - Drug Abuse Screen Panel 13, Urine (Pain Care Package) - lab collect; Future    Ankylosing spondylitis of sacral region (H)  Stable on current regimen.  Continue same plan and routine follow-up.   - nabumetone (RELAFEN) 500 MG tablet; Take 1-2 tablets (500-1,000 mg) by mouth 2 times daily as needed for moderate pain (with food)  - oxyCODONE (ROXICODONE) 5 MG tablet; Take 1 tablet (5 mg) by mouth every 6 hours as needed for pain (maximum 6 tablet(s) per day) . Acute on chronic pain    DDD (degenerative disc disease), lumbar  As above  - nabumetone (RELAFEN) 500 MG tablet; Take 1-2 tablets (500-1,000 mg) by mouth 2 times daily as needed for moderate pain (with food)  - oxyCODONE (ROXICODONE) 5 MG tablet; Take 1 tablet (5 mg) by mouth every 6 hours as needed for pain (maximum 6 tablet(s) per day) . Acute on chronic pain    Lumbar facet arthropathy  As above  - oxyCODONE (ROXICODONE) 5 MG tablet; Take 1 tablet (5 mg) by mouth every 6 hours as needed for pain (maximum 6 tablet(s) per day) . Acute on chronic pain       Depression Screening Follow Up    PHQ 7/8/2021   PHQ-9 Total Score 13   Q9: Thoughts of better off dead/self-harm past 2 weeks Several days   F/U: Thoughts of suicide or self-harm Yes   F/U: Self harm-plan No   F/U: Self-harm action No   F/U: Safety concerns No     Longstanding well  known issues with mental health, followed through a comprehensive program.    Return in about 3 months (around 10/12/2021) for Follow up on Pain, Follow up of Chronic Issues, In Office or Video.    Ksenia Lyles MD  [facility]    Radha Francis is a 46 year old who presents for the following health issues     HPI   Video visit with patient today to follow-up on back pain.  We also discussed his ongoing chronic issues and things are fairly stable.  Working with mental health and may reenter an ongoing program.    Review of Systems   Constitutional, HEENT, cardiovascular, pulmonary, gi and gu systems are negative, except as otherwise noted.      Objective           Vitals:  No vitals were obtained today due to virtual visit.    Physical Exam   GENERAL: Healthy, alert and no distress  EYES: Eyes grossly normal to inspection.  No discharge or erythema, or obvious scleral/conjunctival abnormalities.  RESP: No audible wheeze, cough, or visible cyanosis.  No visible retractions or increased work of breathing.    SKIN: Visible skin clear. No significant rash, abnormal pigmentation or lesions.  NEURO: Cranial nerves grossly intact.  Mentation and speech appropriate for age.  PSYCH: Mentation appears normal, affect normal/bright, judgement and insight intact, normal speech and appearance well-groomed.    Past labs reviewed with the patient.             Video-Visit Details    Type of service:  Video Visit    Video End Time:1044    Originating Location (pt. Location): Home    Distant Location (provider location):  [facility]     Platform used for Video Visit: [employer]   Answers for HPI/ROS submitted by the patient on 7/8/2021  If you checked off any problems, how difficult have these problems made it for you to do your work, take care of things at home, or get along with other people?: Very difficult  PHQ9 TOTAL SCORE: 13  Your back pain is: chronic  Where is your back  pain located? : right lower back, left lower back, right middle of back, left middle of back, right upper back, left upper back, right side of neck, left side of neck  How would you describe your back pain? : burning, cramping, gnawing, sharp, stabbing  Where does your back pain spread? : nowhere  Since you noticed your back pain, how has it changed? : gradually improving  Does your back pain interfere with your job?: Not applicable  How many servings of fruits and vegetables do you eat daily?: 2-3  On average, how many sweetened beverages do you drink each day (Examples: soda, juice, sweet tea, etc.  Do NOT count diet or artificially sweetened beverages)?: 2  How many minutes a day do you exercise enough to make your heart beat faster?: 10 to 19  How many days a week do you exercise enough to make your heart beat faster?: 3 or less  How many days per week do you miss taking your medication?: 3  What makes it hard for you to take your medication every day?: other

## 2021-07-14 ENCOUNTER — ALLIED HEALTH/NURSE VISIT (OUTPATIENT)
Dept: NURSING | Facility: CLINIC | Age: 46
End: 2021-07-14
Payer: MEDICARE

## 2021-07-14 ENCOUNTER — TELEPHONE (OUTPATIENT)
Dept: FAMILY MEDICINE | Facility: CLINIC | Age: 46
End: 2021-07-14

## 2021-07-14 DIAGNOSIS — Z23 IMMUNIZATION DUE: Primary | ICD-10-CM

## 2021-07-14 PROCEDURE — 99207 PR NO CHARGE NURSE ONLY: CPT

## 2021-07-14 PROCEDURE — G0010 ADMIN HEPATITIS B VACCINE: HCPCS

## 2021-07-14 PROCEDURE — 90746 HEPB VACCINE 3 DOSE ADULT IM: CPT

## 2021-07-14 ASSESSMENT — ANXIETY QUESTIONNAIRES
2. NOT BEING ABLE TO STOP OR CONTROL WORRYING: SEVERAL DAYS
4. TROUBLE RELAXING: SEVERAL DAYS
GAD7 TOTAL SCORE: 9
7. FEELING AFRAID AS IF SOMETHING AWFUL MIGHT HAPPEN: SEVERAL DAYS
5. BEING SO RESTLESS THAT IT IS HARD TO SIT STILL: SEVERAL DAYS
3. WORRYING TOO MUCH ABOUT DIFFERENT THINGS: MORE THAN HALF THE DAYS
1. FEELING NERVOUS, ANXIOUS, OR ON EDGE: MORE THAN HALF THE DAYS
6. BECOMING EASILY ANNOYED OR IRRITABLE: SEVERAL DAYS

## 2021-07-14 NOTE — NURSING NOTE
Prior to immunization administration, verified patients identity using patient s name and date of birth. Please see Immunization Activity for additional information.     Screening Questionnaire for Adult Immunization    Are you sick today?   No   Do you have allergies to medications, food, a vaccine component or latex?   No   Have you ever had a serious reaction after receiving a vaccination?   No   Do you have a long-term health problem with heart, lung, kidney, or metabolic disease (e.g., diabetes), asthma, a blood disorder, no spleen, complement component deficiency, a cochlear implant, or a spinal fluid leak?  Are you on long-term aspirin therapy?   No   Do you have cancer, leukemia, HIV/AIDS, or any other immune system problem?   No   Do you have a parent, brother, or sister with an immune system problem?   No   In the past 3 months, have you taken medications that affect  your immune system, such as prednisone, other steroids, or anticancer drugs; drugs for the treatment of rheumatoid arthritis, Crohn s disease, or psoriasis; or have you had radiation treatments?   No   Have you had a seizure, or a brain or other nervous system problem?   No   During the past year, have you received a transfusion of blood or blood    products, or been given immune (gamma) globulin or antiviral drug?   No   For women: Are you pregnant or is there a chance you could become       pregnant during the next month?   No   Have you received any vaccinations in the past 4 weeks?   No     Immunization questionnaire answers were all negative.        Per orders of Dr. Lyles, injection of Hep B given by Grisel Hutchinson. Patient instructed to remain in clinic for 15 minutes afterwards, and to report any adverse reaction to me immediately.       Screening performed by Grisel Hutchinson on 7/14/2021 at 11:12 AM.

## 2021-07-15 ASSESSMENT — ANXIETY QUESTIONNAIRES: GAD7 TOTAL SCORE: 9

## 2021-07-15 NOTE — TELEPHONE ENCOUNTER
Reason for call:  Order   Order or referral being requested: Lab only  Reason for request: Per 7/12/21 appointment, but there is no lab order found from 7/12/21.  Please confirm labs are needed then call patient after order has been placed.  Date needed: as soon as possible  Has the patient been seen by the PCP for this problem? YES    Additional comments:     Phone number to reach patient:  Cell number on file:    Telephone Information:   Mobile [phone]       Best Time:  Anytime    Can we leave a detailed message on this number?  YES    Travel screening: Not Applicable

## 2021-07-16 ENCOUNTER — VIRTUAL VISIT (OUTPATIENT)
Dept: PSYCHOLOGY | Facility: CLINIC | Age: 46
End: 2021-07-16
Payer: MEDICARE

## 2021-07-16 DIAGNOSIS — F41.1 GENERALIZED ANXIETY DISORDER: ICD-10-CM

## 2021-07-16 DIAGNOSIS — F33.1 MDD (MAJOR DEPRESSIVE DISORDER), RECURRENT EPISODE, MODERATE (H): Primary | ICD-10-CM

## 2021-07-16 DIAGNOSIS — F60.3 BORDERLINE PERSONALITY DISORDER (H): ICD-10-CM

## 2021-07-16 PROCEDURE — 90834 PSYTX W PT 45 MINUTES: CPT | Mod: 95 | Performed by: SOCIAL WORKER

## 2021-07-16 NOTE — PROGRESS NOTES
Progress Note    Patient Name: Joel Pineda  Date: 2021           Service Type: Individual      Session Start Time: 11am  Session End Time: 11:45am     Session Length: 45 minutes    Session #: 6    Attendees: Client    Service Modality:  Video Visit:      Provider verified identity through the following two step process.  Patient provided:  Patient     Telemedicine Visit: The patient's condition can be safely assessed and treated via synchronous audio and visual telemedicine encounter.      Reason for Telemedicine Visit: Services only offered telehealth    Originating Site (Patient Location): Patient's home    Distant Site (Provider Location): Provider Remote Setting    Consent:  The patient/guardian has verbally consented to: the potential risks and benefits of telemedicine (video visit) versus in person care; bill my insurance or make self-payment for services provided; and responsibility for payment of non-covered services.     Patient would like the video invitation sent by:  Send to e-mail at: [email or web address]    Mode of Communication:  Video Conference via [employer]    As the provider I attest to compliance with applicable laws and regulations related to telemedicine.     Treatment Plan Last Reviewed: 2021 due 2021  PHQ-9 / YUDI-7 :     DATA  Interactive Complexity: No  Crisis: No       Progress Since Last Session (Related to Symptoms / Goals / Homework):   Symptoms: Stable, minimal change    Homework: Completed in session      Episode of Care Goals: No improvement - CONTEMPLATION (Considering change and yet undecided); Intervened by assessing the negative and positive thinking (ambivalence) about behavior change     Current / Ongoing Stressors and Concerns:  Today and therapist discussed distress tolerance and coping strategies. Also discussed behavioral activation goal of walking twice a week which he did accomplish last week. Reports  it was very difficult to get past the lack of motivation to go.        Treatment Objective(s) Addressed in This Session:   Safety   childhood trauma, family conflict, chronic pain     Intervention:   Solution focused: recommendations for higher level of care; coping plans   DBT: distress tolerance and radical acceptance   CBT: discussed behavioral activation goal of client leaving his home to go out walking two times this week        ASSESSMENT: Current Emotional / Mental Status (status of significant symptoms):   Risk status (Self / Other harm or suicidal ideation)   Patient denies current fears or concerns for personal safety.   Patient reports the following current or recent suicidal ideation or behaviors: fleeting thoughts about suicide without plan or intent.   Patient denies current or recent homicidal ideation or behaviors.   Patient denies current or recent self injurious behavior or ideation.   Patient denies other safety concerns.   Patient reports there has been no change in risk factors since their last session.     Patient reports there has been no change in protective factors since their last session.    A safety and risk management plan has been developed including: Patient consented to co-developed safety plan.  Safety and risk management plan was completed.  Patient agreed to use safety plan should any safety concerns arise.  A copy was given to the patient.     Appearance:   Appropriate    Eye Contact:   Fair    Psychomotor Behavior: Agitated  Restless    Attitude:   Cooperative    Orientation:   All   Speech    Rate / Production: Emotional Talkative    Volume:  Normal    Mood:    Anxious  Depressed  Agitated   Affect:    Worrisome    Thought Content:  Clear    Thought Form:  Coherent    Insight:    Fair      Medication Review:   Changes to psychiatric medications, see updated Medication List in EPIC.      Medication Compliance:   Yes     Changes in Health Issues:   Yes: Pain, Associated  Psychological Distress     Chemical Use Review:   Substance Use: Chemical use reviewed, no active concerns identified      Tobacco Use: No current tobacco use.      Diagnosis:  1. MDD (major depressive disorder), recurrent episode, moderate (H)    2. Generalized anxiety disorder    3. Borderline personality disorder (H)          R/O  296.89 Bipolar II Disorder Depressed  301.83 (F60.3) Borderline Personality Disorder as evidenced by past dx.    Collateral Reports Completed:   Not Applicable    PLAN: (Patient Tasks / Therapist Tasks / Other)  Patient to follow safety plan and go to ED if he cannot remain safe  Utilize effective coping and grounding skills- use safety plan as needed- reviewed crisis resources of emPATH unit at [facility]  Recommended higher level of care, client doing assessment on monday  Client to get outside walking two times this week before next session        Carolynn Rowley  7/16/2021                                                         ______________________________________________________________________    Treatment Plan    Patient's Name: Joel Pineda  YOB: 1975    Date: 5/13/2021    DSM5 Diagnoses: 296.32 (F33.1) Major Depressive Disorder, Recurrent Episode, Moderate With mixed features or 300.02 (F41.1) Generalized Anxiety Disorder  Psychosocial / Contextual Factors: childhood trauma, family conflict, chronic pain  WHODAS: 32    Referral / Collaboration:  referral made for transfer to [facility].    Anticipated number of session or this episode of care: 16      MeasurableTreatment Goal(s) related to diagnosis / functional impairment(s)  Goal 1: Patient will report absence of persistent depression mood and report of reduced frequency and intensity of low mood and absence of persistent low energy and motivation to acceptable levels, report subjective improved motivation and increased energy for a period of 90 days, within 6 months as clinically observed and by patient  self-report    I will know I've met my goal when my action and motivation and action match.      Objective #A (Patient Action)    Patient will demonstrate and report a level of depression that can be managed at a lower level of care.  Absence of persistent depression mood and report of reduced frequency and intensity of low mood and absence of persistent low energy and motivation to acceptable levels, report subjective improved motivation and increased energy for a period of 90 days, within 6 months as clinically observed and by patient self-report.  Status: New - Date: 5/13/2021     Intervention(s)  Therapist will provide individual therapy to identify triggers to depression, gain feedback on helpful coping tools and thought-reframing techniques, and identify preferred way of being.  Tx to include discussion of current stressors and interpersonal conflicts and how to cope with these, coaching, diagnostic testing, referral for medication as indicated, use prescribed medication, cognitive restructuring, interpersonal, family therapy, supportive therapy services.        Goal 2: Patient will report absence of persistent anxiety mood and report of reduced frequency and intensity of worry and absence of persistent anxious mood to acceptable levels, no panic attacks, report subjective comfort with rumination for a period of 90 days. Within 6 months as clinically observed and by patient self-report    I will know I've met my goal when I am not too trouble by what goes on around me.      Objective #A (Patient Action)    Status: New - Date: 5/13/2021     Patient will demonstrate and report a level of anxiety that can be managed at a lower level of care.  Absence of persistent anxious mood and report of reduced frequency and intensity of worry and absence of persistent anxious mood to acceptable levels, no panic attacks, report subjective comfort with rumination for a period of 90 days, within 6 months as clinically observed  and by patient self-report.    Intervention(s)  Therapist will provide individual therapy to identify triggers to anxiety, gain feedback on helpful coping tools and thought-reframing techniques, and identify preferred way of being. Tx to include discuss current stressors and interpersonal conflicts and how to cope with these, coaching, diagnostic testing , referral for medication as indicated, use prescribed medication, cognitive restructuring, interpersonal,   family therapy, supportive therapy services.        Goal 3: Patiient will report absence of persistent SI and report of reduced frequency and intensity of SI and absence of SI to acceptable levels, report subjective improved mood for a period of 90 days, within 6 months as clinically observed and by patient self-report    I will know I've met my goal when I no longer struggle with them daily.      Objective #A (Patient Action)    Status: New - Date: 5/13/2021     Patient will demonstrate control of impulses and ability to use positive coping tools to manage strong emotions that can be safely addressed at a lower level of care. Absence of persistent SI and report of reduced frequency and intensity of SI and absence of SI to acceptable levels, report subjective improved mood for a period of 90 days, within 6 months as clinically observed and by patient self-report.    Intervention(s)  Therapist will provide individual therapy to identify triggers to SI urges, gain feedback on helpful coping strategies, and identify ways that family can offer support. Tx to discuss current stressors and interpersonal conflicts and how to cope with these, coaching, diagnostic testing, referral for medication as indicated, use prescribed medication, cognitive restructuring, interpersonal family therapy, supportive therapy services.      Patient has reviewed and agreed to the above plan.      Carolynn Rowley  May 17, 2021                                                   Joel SCOTT  "[place]     SAFETY PLAN:  Step 1: Warning signs / cues (Thoughts, images, mood, situation, behavior) that a crisis may be developing:    Thoughts: \"I can't do this anymore\" and I'd rather be gone than feel this pain    Images: visual hallucinations- last time client experienced this he went directly to hospital    Thinking Processes: disorganized thinking: reported hallucinations in the past and paranoia: .    Mood: worsening depression, hopelessness, helplessness, agitation and increase in pain    Behaviors: sleeping too much and hygiene and ADLs not being done Also look out for medication side effects    Situations: pain and sleep deprivation   Step 2: Coping strategies - Things I can do to take my mind off of my problems without contacting another person (relaxation technique, physical activity):    Distress Tolerance Strategies:  relaxation activities: watch concerts on [employer], play with my pet , sensory based activities/self-soothe with five senses: bubble baths with music playing, change body temperature (ice pack/cold water) , paced breathing/progressive muscle relaxation and training cats.    Physical Activities: go for a walk, deep breathing and stretching     Focus on helpful thoughts:  \"This is temporary\", \"I will get through this\", \"It always passes\", \"Ride the wave\", think about happy memories: remembering warm summer nights riding [place] bike [place], remind myself of what is important to me: my animals Pumpkin and Patches, my 10 year old niece Susan, my mom, sister, and brother and self-compassion statements: \"This is hard but it can get better.\"  Step 3: People and social settings that provide distraction:   Name: sister Phone: [phone]   Name: Quinn Phone: [phone]   Name: Tono Phone: [phone]    [place] and [facility]   Step 4: Remind myself of people and things that are important to me and worth living for:  My family, mom, sister, brother, and niece and cats. Friend Tono  Step " 5: When I am in crisis, I can ask these people to help me use my safety plan:   Name:  Phone: [phone]   Name: Quinn Phone: [phone]   Name: Tono Phone: [phone]  Step 6: Making the environment safe:     dispose of old medications  and be around others  Step 7: Professionals or agencies I can contact during a crisis:    [facility] Daytime Number: [phone]    Suicide Prevention Lifeline: 8-844-020-MSJL (6376)    Crisis Text Line Service (available 24 hours a day, 7 days a week): Text [place] to 061494    [facility]    Walk in [facility] www.walkin.org    COPE [phone]  Local Crisis Services: 988    Call 911 or go to my nearest emergency department.   I helped develop this safety plan and agree to use it when needed.  I have been given a copy of this plan.      Client signature _________________________________________________________________  Today s date:  5/13/2021  Adapted from Safety Plan Template 2008 Shanita Lazcano and Ariel Lorenzo is reprinted with the express permission of the authors.  No portion of the Safety Plan Template may be reproduced without the express, written permission.  You can contact the authors at [email or web address] or [email or web address].  FIORELLA Diaz, LICSW  6/4/2021

## 2021-07-19 ENCOUNTER — HOSPITAL ENCOUNTER (OUTPATIENT)
Dept: BEHAVIORAL HEALTH | Facility: CLINIC | Age: 46
End: 2021-07-19
Attending: PSYCHIATRY & NEUROLOGY
Payer: MEDICARE

## 2021-07-19 PROCEDURE — 999N000216 HC STATISTIC ADULT CD FACE TO FACE-NO CHRG: Mod: GT | Performed by: COUNSELOR

## 2021-07-19 ASSESSMENT — ANXIETY QUESTIONNAIRES
1. FEELING NERVOUS, ANXIOUS, OR ON EDGE: MORE THAN HALF THE DAYS
6. BECOMING EASILY ANNOYED OR IRRITABLE: SEVERAL DAYS
8. IF YOU CHECKED OFF ANY PROBLEMS, HOW DIFFICULT HAVE THESE MADE IT FOR YOU TO DO YOUR WORK, TAKE CARE OF THINGS AT HOME, OR GET ALONG WITH OTHER PEOPLE?: VERY DIFFICULT
2. NOT BEING ABLE TO STOP OR CONTROL WORRYING: SEVERAL DAYS
GAD7 TOTAL SCORE: 9
5. BEING SO RESTLESS THAT IT IS HARD TO SIT STILL: SEVERAL DAYS
3. WORRYING TOO MUCH ABOUT DIFFERENT THINGS: MORE THAN HALF THE DAYS
7. FEELING AFRAID AS IF SOMETHING AWFUL MIGHT HAPPEN: SEVERAL DAYS
GAD7 TOTAL SCORE: 9
7. FEELING AFRAID AS IF SOMETHING AWFUL MIGHT HAPPEN: SEVERAL DAYS
GAD7 TOTAL SCORE: 9
4. TROUBLE RELAXING: SEVERAL DAYS

## 2021-07-19 ASSESSMENT — PATIENT HEALTH QUESTIONNAIRE - PHQ9
10. IF YOU CHECKED OFF ANY PROBLEMS, HOW DIFFICULT HAVE THESE PROBLEMS MADE IT FOR YOU TO DO YOUR WORK, TAKE CARE OF THINGS AT HOME, OR GET ALONG WITH OTHER PEOPLE: VERY DIFFICULT
SUM OF ALL RESPONSES TO PHQ QUESTIONS 1-9: 15
SUM OF ALL RESPONSES TO PHQ QUESTIONS 1-9: 15

## 2021-07-19 NOTE — PROGRESS NOTES
Adult Diagnostic Assessment Update    [facility]  Provider Name: FIORELLA Munguia, [facility], [facility]      PATIENT'S NAME: Joel Pineda  PREFERRED NAME: Tito  PRONOUNS:   He/him  MRN:   [id number]  :   1975   ACCT. NUMBER: [id number]  DATE OF SERVICE: 21  START TIME: 11:00am  END TIME: 11:44am  PREFERRED PHONE: [phone]  [email or web address]    Emergency Contacts:  Tj Pineda (father) [phone]   AND  Geovanna Matute (mother) [phone]   May we leave a program related message: Yes  SERVICE MODALITY:  Video Visit:      Provider verified identity through the following two step process.  Patient provided:  Patient photo    Telemedicine Visit: The patient's condition can be safely assessed and treated via synchronous audio and visual telemedicine encounter.      Reason for Telemedicine Visit: Services only offered telehealth    Originating Site (Patient Location): Patient's home    Distant Site (Provider Location): Provider Remote Setting- Home Office    Consent:  The patient/guardian has verbally consented to: the potential risks and benefits of telemedicine (video visit) versus in person care; bill my insurance or make self-payment for services provided; and responsibility for payment of non-covered services.     Patient would like the video invitation sent by:  My Chart    Mode of Communication:  Video Conference via Amwell    As the provider I attest to compliance with applicable laws and regulations related to telemedicine.      Provider reviewed initial DA dated:  2021 by Jenny Anaya and [facility]    Identifying Information:  Patient is a 46 year old  The pronoun use throughout this assessment reflects the patient's chosen pronoun.  Patient was referred for an assessment by [facility] Behavioral Therapist Carolynn Rowley. Patient attended the session alone.     Chief Complaint:   The reason for  "seeking services at this time is: \"I need more mental health support than a weekly 45m session\".  He feels defeated and deflated for needing the group again. The group and structure was helpful, but he not able to maintain the progress with once per week therapy sessions. He is extremely lonely and has lack of structure. He is in pain when he walks. He did 10 weeks of [facility] day [facility] groups from 12/2020 until 02/2021 or 03/2021. He was participating in the aftercare group once per week for 2 hours and that was helpful. In the past, for about 3 years until 9 months ago, he did DBT at [facility] and then worked with the individual therapist for a while until she could no longer see him due to staffing reductions.       Patient does not want family members involved in their care.    Current Stressors/Losses/Concerns: Continued feelings of depression and pain.     Current Outpatient Medications   Medication     acetaminophen 500 MG CAPS     albuterol (PROAIR HFA/PROVENTIL HFA/VENTOLIN HFA) 108 (90 Base) MCG/ACT inhaler     aspirin (ASA) 81 MG tablet     baclofen (LIORESAL) 10 MG tablet     cetirizine (ZYRTEC) 10 MG tablet     cholecalciferol (D3-50) 1250 mcg (79024 units) capsule     clonazePAM (KLONOPIN) 0.5 MG tablet     cyanocobalamin (CYANOCOBALAMIN) 1000 MCG/ML injection     DULoxetine (CYMBALTA) 60 MG capsule     EPINEPHrine (ANY BX GENERIC EQUIV) 0.3 MG/0.3ML injection 2-pack     eszopiclone (LUNESTA) 2 MG tablet     etanercept (ENBREL SURECLICK) 50 MG/ML autoinjector     famotidine (PEPCID) 20 MG tablet     fluticasone (FLONASE) 50 MCG/ACT nasal spray     fluticasone-salmeterol (ADVAIR) 250-50 MCG/DOSE inhaler     fluticasone-salmeterol (ADVAIR) 500-50 MCG/DOSE inhaler     glipiZIDE (GLUCOTROL XL) 2.5 MG 24 hr tablet     lamoTRIgine (LAMICTAL) 100 MG tablet     levothyroxine (SYNTHROID/LEVOTHROID) 75 MCG tablet     lidocaine (XYLOCAINE) 5 % external ointment     melatonin 3 MG tablet     " "metoclopramide (REGLAN) 5 MG tablet     metoprolol succinate ER (TOPROL-XL) 200 MG 24 hr tablet     montelukast (SINGULAIR) 10 MG tablet     nabumetone (RELAFEN) 500 MG tablet     naloxone (NARCAN) 4 MG/0.1ML nasal spray     olopatadine (PATADAY) 0.2 % ophthalmic solution     omega-3 acid ethyl esters (LOVAZA) 1 g capsule     omeprazole 20 MG tablet     ondansetron (ZOFRAN-ODT) 8 MG ODT tab     order for DME     order for DME     order for DME     oxyCODONE (ROXICODONE) 5 MG tablet     polyethylene glycol (MIRALAX) 17 g packet     pregabalin (LYRICA) 150 MG capsule     QUEtiapine (SEROQUEL) 100 MG tablet     ramipril (ALTACE) 10 MG capsule     rizatriptan (MAXALT-MLT) 5 MG ODT     rosuvastatin (CRESTOR) 40 MG tablet     syringe, disposable, (BD TUBERCULIN SYRINGE) 1 ML MISC     syringe/needle, disp, (BD INTEGRA SYRINGE) 25G X 1\" 3 ML MISC     triamcinolone (KENALOG) 0.1 % external cream     vitamin B complex with vitamin C (STRESS TAB) tablet     ZINC SULFATE-VITAMIN C MT     Medication Adherence:  Patient reports taking prescribed medications as prescribed. Medications are prescribed by Dr. Alegria at [facility]. The doctor keeps telling him that it is patient's situation, not the medication. Patient has been working with him for 18 months. Patient is also meeting with [place] psychiatrist Dr. Boland for check ins.     Patient Allergies:    Allergies   Allergen Reactions     Amoxicillin-Pot Clavulanate Difficulty breathing     Banana Shortness Of Breath     Pt reports organic Banana is okay.      Nitroglycerin Palpitations     Penicillins Anaphylaxis     Provigil [Modafinil] Shortness Of Breath     headache     Gadolinium Hives and Itching     Patient was premedicated for the contrast allergy. He did still have a reaction a few hours after injection. Hives and itching. Dr. Gomez told tech to inform pt he should only have contrast again in the future when premedicated and at a hospital. Not at " "an outpatient facility.      Ketoconazole      Topical cream caused swelling and itching     Dye [Contrast Dye] Other (See Comments) and Hives     Moderate flushing, CT contrast     Golimumab      Hives, bradycardia, face swelling     Neurontin [Gabapentin] Hives     Moderate hives     Nortriptyline Hives     Varicella Virus Vaccine Live      Rash     Flagyl [Metronidazole Hcl] Palpitations and Hives     Latex Rash     Metronidazole Palpitations, Other (See Comments) and Rash     dizziness (versus ciprofloxacin taken at same time)       Medical History:    Past Medical History:   Diagnosis Date     Acne      Acquired hypothyroidism      Allergic state      Ankylosing spondylitis lumbar region (H)      Ankylosing spondylitis of sacral region (H)      Anxiety      Bipolar 2 disorder (H)      Chest pain     Chest pain, regulated w/BP meds. Clear arteries.     Chronic pain      DDD (degenerative disc disease), lumbar      Depressive disorder      Diabetes (H)      Diverticulosis      Facet arthritis of cervical region      Gastroesophageal reflux disease      Hypertension      IBS (irritable bowel syndrome)      Intracranial arachnoid cyst      Polyneuropathy      Pulmonary embolism (H)      Skin exam, screening for cancer 12/3/2013     Sleep apnea      Uncomplicated asthma      Since the initial DAJoel:  denies changes in his medical history.  He continues to have chronic pain in feet/body, stomach. He is managing pain with Oxycodone.  The patient has a [place] Primary Care Provider, who is named Ksenia yLles.  denies changes in his living situation. [place], Pumpkin and Patches. Housing is stable.  denies changes in his employment. Patient is currently disabled and receiving Long-Term Care Disability. He bought a [employer] policy when he worked at [employer]. Patient reports their finances are obtained through \"disability;SSDI disability.\"  denies changes " "regarding financial concerns or gambling behavior.  Patient does identify finances as a current stressor.  denies  changes in education status.   denies ability to meet his basic needs.   Since the initial DA, the Joel denies changes with his relationships/support system. Patient's current relationship status is single.   Patient identified their sexual orientation as heterosexual.  Patient reported having no  child(reagan). Patient identified \"mother;father;siblings;pets;friends\" as part of their support system.  Patient identified the quality of these relationships as \"inconsistent.\"      Significant Losses / Trauma / Abuse / Neglect Issues:   Since the initial DA, Joel denies new losses/trauma/abuse/neglect issues.     Substance Use History:       Substance History of use Age of first use Date of last use Pattern and duration of use (include amounts and frequency)   Alcohol used in past 16 01/01/10    Cannabis never used  last year  this week Almost a year ago, he started medical marijuana program to help with stomach pain. He was using a topical medicine before, but now is taking a sublingual form. He is taking it twice daily and it helps with stomach pain.   Amphetamines never used        Cocaine/crack  never used          Hallucinogens never used            Inhalants never used            Heroin never used            Other Opiates currently use Rx 07/18/21 He is taking Oxycodone 5mg about 3 to 4 times per week. He is taking them as prescribed or less than prescribed.      Benzodiazepine   currently use Rx 07/18/21 He is prescribed 0.5mg Klonopin for sleep nightly. It quiets his body so he doesn't get out of bed or fall out of bed.    Barbiturates never used        Over the counter meds used in past      Caffeine currently use ?  07/19/21 He drinks coffee daily.    Nicotine  never used        Other substances  never used          Patient reported the following problems as a result of their substance use: \"no " "problems, not applicable.\"     CAGE-AID Total Score 11/23/2020 5/7/2021 7/14/2021   Total Score MyChart - - 0      Substance Use: No symptoms    Based on the negative CAGE score and clinical interview there  are not indications of drug or alcohol abuse.    Current Mental Status Exam:   Appearance:  Appropriate    Eye Contact:  Good   Psychomotor:  Normal       Gait / station:  sitting  Attitude / Demeanor: Cooperative  Friendly  Speech      Rate / Production: Normal/ Responsive      Volume:  Normal  volume      Language:  intact  Mood:   Normal Dysphoric  Affect:   Appropriate    Thought Content: Clear   Thought Process: Coherent  Logical       Associations: No loosening of associations  Insight:   Good   Judgment:  Intact   Orientation:  All  Attention/concentration: Good    Rating Scales:      PHQ-9 SCORE 5/13/2021 7/8/2021 7/19/2021   PHQ-9 Total Score MyChart 17 13 (Moderate depression) 15 (Moderately severe depression)      YUDI-7 SCORE 5/13/2021 7/14/2021 7/19/2021   Total Score 16 9 9 (mild anxiety)     Clinical Global Impressions  First:  Considering your total clinical experience with this particular patient population, how severe are the patient's symptoms at this time?: 5 (7/19/2021 11:30 AM)  Most recent:  Compared to the patient's condition at the START of treatment, this patient's condition is: 4 (7/19/2021 11:30 AM)    Safety Assessment:  Current Safety Concerns: \"It gets tiresome to have constant SI.\" He feels better when working or volunteering. He doesn't have the energy or his \"mood wins\" and quits what he was trying to do. He denied suicidal plans and intent.     Cook Suicide Severity Rating (Short Version) 3/31/2021 4/7/2021 4/21/2021 4/28/2021 5/5/2021 5/12/2021 7/19/2021   Over the past 2 weeks have you felt down, depressed, or hopeless? yes yes yes yes yes yes yes   Over the past 2 weeks have you had thoughts of killing yourself? no no no no no no yes   Have you ever attempted to kill " yourself? no no no no no no no   Q1 Wished to be Dead (Past Month) - - - - - - yes   Q2 Suicidal Thoughts (Past Month) - - - - - - no   Q3 Suicidal Thought Method - - - - - - no   Q4 Suicidal Intent without Specific Plan - - - - - - no   Q5 Suicide Intent with Specific Plan - - - - - - no   Q6 Suicide Behavior (Lifetime) - - - - - - no     Patient denies current homicidal ideation and behaviors.  Patient denies current self-injurious ideation and behaviors.    Patient denied risk behaviors associated with substance use.  Patient denies any high risk behaviors associated with mental health symptoms.  Patient reports the following current concerns for their personal safety: None.  Patient reports there are not firearms in the house.    Patient reports the following protective factors: dedication to family or friends;effectively controls impulses;purpose;secure attachment;help seeking behaviors when distressed;abstinence from substances;adherence with prescribed medication;uses community crisis resources;healthy fear of risky behaviors or pain         See Preliminary Treatment Plan for Safety and Risk Management Plan    Review of Symptoms per patient report:  Depression: Change in sleep, Lack of interest, Change in energy level, Change in appetite, Psychomotor slowing or agitation, Suicidal ideation, Feelings of hopelessness, Feelings of helplessness, Low self-worth, Feeling sad, down, or depressed and Withdrawn  Regina:  No Symptoms - He stated he was diagnosed with Bipolar Mixed in 2015. He had some psychosis but it was related to autoimmune disorder. He is on Seroquel.   Psychosis: No Symptoms  Anxiety: Excessive worry, Physical complaints, such as headaches, stomachaches, muscle tension and Sleep disturbance  Panic:  No symptoms  Post Traumatic Stress Disorder:  Experienced traumatic event   Eating Disorder: Undereating - He has gastroparesis. He is not eating much, but still feels sick. He is eating 1/2 of protein  "bars for meals. He thinks he is dragging because of lack of nutrients. He is overwhelmed with physical pain.   ADD / ADHD:  No symptoms  Conduct Disorder: No symptoms  Autism Spectrum Disorder: No symptoms - A doctor in the past thought he had Asperger's, but he was tested and didn't have it.   Obsessive Compulsive Disorder: No Symptoms    Patient reports the following compulsive behaviors and treatment history: None    Diagnostic Criteria:   A) Recurrent episode(s) - symptoms have been present during the same 2-week period and represent a change from previous functioning 5 or more symptoms (required for diagnosis)   - Depressed mood. Note: In children and adolescents, can be irritable mood.     - Diminished interest or pleasure in all, or almost all, activities.    - Significant weight loss when not dieting decrease in appetite.    - Decreased sleep.    - Fatigue or loss of energy.    - Feelings of worthlessness or excessive guilt.    - Diminished ability to think or concentrate, or indecisiveness.    - Recurrent thoughts of death (not just fear of dying), recurrent suicidal ideation without a specific plan, or a suicide attempt or a specific plan for committing suicide.   B) The symptoms cause clinically significant distress or impairment in social, occupational, or other important areas of functioning  C) The episode is not attributable to the physiological effects of a substance or to another medical condition  D) The occurence of major depressive episode is not better explained by other thought / psychotic disorders  E) There has never been a manic episode or hypomanic episode     Functional Status:  Patient reports the following functional impairments: \"academic performance;chronic disease management;educational activities;health maintenance;home life;management of the household and or completion of tasks;relationship(s);self care;social interactions.\"         WHODAS 2.0 Total Score 5/4/2021 7/19/2021   Total " Score 32 40     Programmatic care:  Current LOCUS was assessed and patient needs the following level of care based on score 19.    Clinical Summary:  1. Reason for assessment: Patient wants day treatment groups.  2. Psychosocial, Cultural and Contextual Factors: lives alone, few opportunities for social connections.  3. Principal DSM5 Diagnoses  (Sustained by DSM5 Criteria Listed Above):   296.33 (F33.2) Major Depressive Disorder, Recurrent Episode, Severe.  4. Other Diagnoses that is relevant to services:   300.02 (F41.1) Generalized Anxiety Disorder.  5. Provisional Diagnosis:    6. Prognosis: Expect Improvement and Relieve Acute Symptoms.  7. Likely consequences of symptoms if not treated: patient may need higher level of care.  8. Patient's strengths/skills/abilities include:  committed to sobriety, creative, educated, empathetic, has a previous history of therapy, insightful, intelligent, motivated, open to learning and work history .     Recommendations:     1. Plan for Safety and Risk Management:A safety and risk management plan has been developed including: Patient consented to co-developed safety plan.  Safety and risk management plan was completed.  Patient agreed to use safety plan should any safety concerns arise.  Report to child / adult protection services was NA.     2. Patient did not identify concerns with a cultural influence.     3. Initial Treatment will focus on: Depressed Mood.     4. Resources/Service Plan:    services are not indicated.   Modifications to assist communication are not indicated.   Additional disability accommodations are not indicated.      5. Collaboration:  Collaboration / coordination of treatment will be initiated with the following support professionals: possibly with Dr. Alegria at [facility].      6.  Referrals:   The following referral(s) will be initiated: Adult Day Treatment. Next Scheduled Appointment: Patient placed on waitlist.    7. TRACEY:  Recommendations:  Continue to abstain from alcohol. Take medications as prescribed.  Patient reports they are willing to follow these recommendations. Patient has a history of opiate use.    8. Records were reviewed at time of assessment. Information in this assessment was obtained from the medical record and provided by patient who is a good historian. Patient will have open access to their mental health medical record.          Provider Name/ Credentials:  Radha Machuca, FIORELLA, SCOTT, ALEKSANDARC  July 19, 2021        Outpatient Mental Health Services - Adult    MY COPING PLAN FOR SAFETY    PATIENT'S NAME: Joel Pineda  MRN:   [id number]    SAFETY PLAN:    Step 1: Warning signs / cues (Thoughts, images, mood, situation, behavior) that a crisis may be developing:      Thoughts:   Nothing makes it better    Images: none    Thinking Processes: ruminations (can't stop thinking about problems)    Mood: worsening depression, hopelessness and helplessness    Behaviors: isolating/withdrawing , not taking care of myself and not taking care of my responsibilities    Situations: pain, loneliness, lack of structure     Step 2: Coping strategies - Things I can do to take my mind off of my problems without contacting another person (relaxation technique, physical activity):      Distress Tolerance Strategies:  watch concerts on [place], play with my pet  and training cats.    Physical Activities: deep breathing. Difficulty walking now    Focus on helpful thoughts:  remind myself of what is important to me and self-compassion statements    Step 3: People and social settings that provide distraction:      Name:        Phone: [phone]               Name: Quinn       Phone: [phone]               Name: Tono         Phone: [phone]   Outside - fresh air     Step 4: Remind myself of people and things that are important to me and worth living for:  Family and cats, Pumpkin and Patches    Step 5: When I am in crisis, I can ask  these people to help me use my safety plan:     Name: Crisis Line   - in phone.     Step 6: Making the environment safe:       be around others    Step 7: Professionals or agencies I can contact during a crisis:      Suicide Prevention Lifeline: 5-956-907-TALK (8774)    Crisis Text Line Service (available 24 hours a day, 7 days a week): Text [place] to 991749    Call  **CRISIS (883078) from a cell phone to talk to a team of professionals who can help you.    [place] Warmline - 463.519.1546  or text  Support  to 02658  - The Warmline provides a safe, anonymous and confidential environment to connect with people who are here to listen here to help. Open Monday-Saturday, 12 PM to 10 PM. It provides a fokg-td-purv approach to mental health recovery, support and wellness. Calls are answered by our team of professionally trained Certified Peer Specialists, who have first hand experience living with a mental health condition.    https://mentalhealth[place].org/support/[place]-warmline/    Crisis Services By County: Phone Number:   [place]     [phone]   [place]    [phone]   [place]    [phone]   [place]    [phone]   [place]    [phone]   [place] [phone]   [place]     [phone]       Call 911 or go to my nearest emergency department.     I helped develop this safety plan and agree to use it when needed.  I have been given a copy of this plan.        Today s date:  2021  Adapted from Safety Plan Template 2008 Shanita Lazcano and Ariel Lorenzo is reprinted with the express permission of the authors.  No portion of the Safety Plan Template may be reproduced without the express, written permission.  You can contact the authors at [email or web address] or [email or web address]            LOCUS Worksheet     Name: Joel Pineda MRN: [id number]    : 1975      Gender:  male    PMI:     Provider Name: Zadspaceth Gianna   Provider NPI:  [id number]    Actual level of Care Provided:   therapy    Service(s) receiving or referred to:  Adult Day Treatment    Reason for Variance: patient needs more structure and supports    Rating completed by: FIORELLA Munguia, SCOTT, CLOTILDE      I. Risk of Harm:   2      Low Risk of Harm    II. Functional Status:   4      Serious Impairment    III. Co-Morbidity:   3      Significant Co-Morbidity    IV - A. Recovery Environment - Level of Stress:   2      Mildly Stressful Environment    IV - B. Recovery Environment - Level of Support:   3      Limited Support in Environment    V. Treatment and Recovery History:   3      Moderate to Equivocal Response to Treatment and Recovery Management    VI. Engagement and Recovery Project:   2      Positive Engagement and Recovery       19 Composite Score    Level of Care Recommendation:   17 to 19       High Intensity Community Based Services

## 2021-07-20 ENCOUNTER — TELEPHONE (OUTPATIENT)
Dept: BEHAVIORAL HEALTH | Facility: CLINIC | Age: 46
End: 2021-07-20

## 2021-07-20 ENCOUNTER — BEH TREATMENT PLAN (OUTPATIENT)
Dept: BEHAVIORAL HEALTH | Facility: CLINIC | Age: 46
End: 2021-07-20
Attending: PSYCHIATRY & NEUROLOGY
Payer: MEDICARE

## 2021-07-20 DIAGNOSIS — F33.2 MDD (MAJOR DEPRESSIVE DISORDER), RECURRENT SEVERE, WITHOUT PSYCHOSIS (H): Primary | ICD-10-CM

## 2021-07-20 ASSESSMENT — PATIENT HEALTH QUESTIONNAIRE - PHQ9: SUM OF ALL RESPONSES TO PHQ QUESTIONS 1-9: 15

## 2021-07-20 NOTE — PROGRESS NOTES
Outpatient Mental Health Services - Adult     MY COPING PLAN FOR SAFETY     PATIENT'S NAME:    Joel Pineda  MRN:                           [id number]     SAFETY PLAN:     Step 1: Warning signs / cues (Thoughts, images, mood, situation, behavior) that a crisis may be developing:     ? Thoughts:   Nothing makes it better  ? Images: none  ? Thinking Processes: ruminations (can't stop thinking about problems)  ? Mood: worsening depression, hopelessness and helplessness  ? Behaviors: isolating/withdrawing , not taking care of myself and not taking care of my responsibilities  ? Situations: pain, loneliness, lack of structure      Step 2: Coping strategies - Things I can do to take my mind off of my problems without contacting another person (relaxation technique, physical activity):     ? Distress Tolerance Strategies:  watch concerts on youtube, play with my pet  and training cats.  ? Physical Activities: deep breathing. Difficulty walking now  ? Focus on helpful thoughts:  remind myself of what is important to me and self-compassion statements     Step 3: People and social settings that provide distraction:                  Name:        Phone: [phone]               Name: Quinn       Phone: [phone]               Name: Tono         Phone: [phone]              Outside - fresh air            Step 4: Remind myself of people and things that are important to me and worth living for:  Family and cats, Pumpkin and Patches     Step 5: When I am in crisis, I can ask these people to help me use my safety plan:                 Name: Crisis Line   - in phone.      Step 6: Making the environment safe:      ? be around others     Step 7: Professionals or agencies I can contact during a crisis:     ? Suicide Prevention Lifeline: 3-563-812-OXYA (7201)  ? Crisis Text Line Service (available 24 hours a day, 7 days a week): Text [place] to 565127  ? Call  **CRISIS (442526) from a cell phone to talk to a team of  professionals who can help you.  ? [place] Warmline - 484-795-3757  or text  Support  to 94307  - The Warmline provides a safe, anonymous and confidential environment to connect with people who are here to listen here to help. Open Monday-Saturday, 12 PM to 10 PM. It provides a zvuf-dr-qfln approach to mental health recovery, support and wellness. Calls are answered by our team of professionally trained Certified Peer Specialists, who have first hand experience living with a mental health condition.    [email or web address]/support/[email or web address]/          Crisis Services By County: Phone Number:   [place]     [phone]   [place]    [phone]   [place]    [phone]   [place]    [phone]   [place]    [phone]   [place] [phone]   [place]     [phone]      ? Call 911 or go to my nearest emergency department.             I helped develop this safety plan and agree to use it when needed.  I have been given a copy of this plan.          Today s date:  7/19/2021  Adapted from Safety Plan Template 2008 Shanita Lazcano and Ariel Lorenzo is reprinted with the express permission of the authors.  No portion of the Safety Plan Template may be reproduced without the express, written permission.  You can contact the authors at [email or web address] or [email or web address]

## 2021-07-20 NOTE — PROGRESS NOTES
Admission Date: 7/20/2021    Identify any current concerns with potential impact to admission:     medication/medical concerns: has worsening pain from disease - dysfunction in is nervous system.      immediate safety concerns: has had regular SI for years - no intent.  Follows safety plan    Does patient have safety plan? yes  Note: Please copy safety plan copied into BE[facility] Encounter     Other (insurance/childcare/transportation/housing/planned absences/etc): none    Patient's insurance is: Medicare / [employer] supplement .     Does patient need appointment with provider? yes    Review patient's program schedule and inform them of any variation due to late days or holidays.          Completed by: SCOTT Posada  \

## 2021-07-20 NOTE — TELEPHONE ENCOUNTER
Writer called Pt today discuss the program.  Pt has been involved in the program in the past.  He stated he had been doing ok but finds with less structure symptoms increased.  He was last in the program and then followed by Clinic earlier in 2021.  We discussed the option of starting the once per week Clinic program to see if that level of intervention will meet needs instead of having him wait for the next opening in IOP (approx 5-6 week wait).  He agreed with this plan.  Will have him still on he waiting list for the IOP track in the meantime in case needs to increase level of care in a few weeks.  We discussed the Clinic options.  He will start in the program tomorrow.  Will inform the therapist of the plan. Completed his admission.

## 2021-07-21 ENCOUNTER — HOSPITAL ENCOUNTER (OUTPATIENT)
Dept: BEHAVIORAL HEALTH | Facility: CLINIC | Age: 46
End: 2021-07-21
Attending: PSYCHIATRY & NEUROLOGY
Payer: MEDICARE

## 2021-07-21 PROBLEM — F33.2 MDD (MAJOR DEPRESSIVE DISORDER), RECURRENT SEVERE, WITHOUT PSYCHOSIS (H): Status: ACTIVE | Noted: 2021-07-21

## 2021-07-21 PROCEDURE — 90853 GROUP PSYCHOTHERAPY: CPT | Mod: GT | Performed by: SOCIAL WORKER

## 2021-07-21 NOTE — GROUP NOTE
Process Group Note    PATIENT'S NAME: Joel Pineda  MRN:   [id number]  :   1975  ACCT. NUMBER: [id number]  DATE OF SERVICE: 21  START TIME:  1:00 PM  END TIME:  2:50 PM  FACILITATOR: Magali Wayne LICSW  TOPIC: MH Process Group    Diagnoses:   296.33 (F33.2) Major Depressive Disorder, Recurrent Episode, Severe.  4. Other Diagnoses that is relevant to services:   300.02 (F41.1) Generalized Anxiety Disorder.      [facility] Day Treatment  TRACK: clinic 1+    NUMBER OF PARTICIPANTS: 5                                    Service Modality:  Video Visit     Telemedicine Visit: The patient's condition can be safely assessed and treated via synchronous audio and visual telemedicine encounter.      Reason for Telemedicine Visit: Services only offered telehealth and covid19    Originating Site (Patient Location): Patient's home    Distant Site (Provider Location): Provider Remote Setting- Home Office    Consent:  The patient/guardian has verbally consented to: the potential risks and benefits of telemedicine (video visit) versus in person care; bill my insurance or make self-payment for services provided; and responsibility for payment of non-covered services.     Patient would like the video invitation sent by:  My Chart    Mode of Communication:  Video Conference via Medical Zoom    As the provider I attest to compliance with applicable laws and regulations related to telemedicine.               Data:    Session content: At the start of this group, patients were invited to check in by identifying themselves, describing their current emotional status, and identifying issues to address in this group.   Area(s) of treatment focus addressed in this session included Symptom Management and Personal Safety.  Tito was introduced to group members today and reoriented to group process He shared that he is struggling with depression and anxiety as well as physical discomfort and concerns.  He  shared with the group that he is having issues with memory and asked for the group's feedback.  During the second hour the group addressed self compassion.    Therapeutic Interventions/Treatment Strategies:  Psychotherapist offered support, feedback and validation and reinforced use of skills. Treatment modalities used include Cognitive Behavioral Therapy and Dialectical Behavioral Therapy. Interventions include Coping Skills: Discussed use of self-soothe skills to decrease distress in the body and Addressed barriers to utilizing coping skills when in distress and Symptoms Management: Promoted understanding of their diagnoses and how it impacts their functioning.    Assessment:    Patient response:   Patient responded to session by accepting feedback, giving feedback, listening, focusing on goals, being attentive, accepting support, appearing alert and verbalizing understanding    Possible barriers to participation / learning include: and no barriers identified    Health Issues:   None reported       Substance Use Review:   Substance Use: No active concerns identified.    Mental Status/Behavioral Observations  Appearance:   Appropriate   Eye Contact:   Good   Psychomotor Behavior: Normal   Attitude:   Cooperative   Orientation:   All  Speech   Rate / Production: Normal    Volume:  Normal   Mood:    Anxious  Normal  Affect:    Appropriate   Thought Content:   Clear  Thought Form:  Coherent  Goal Directed  Logical     Insight:    Good     Plan:     Safety Plan: No current safety concerns identified.  Recommended that patient call 911 or go to the local ED should there be a change in any of these risk factors.     Barriers to treatment: None identified    Patient Contracts (see media tab):  None    Substance Use: Not addressed in session     Continue or Discharge: Patient will continue in Adult Day Treatment (ADT)  as planned. Patient is likely to benefit from learning and using skills as they work toward the goals  identified in their treatment plan.      Magali Wayne, [facility]  July 21, 2021

## 2021-07-21 NOTE — PROGRESS NOTES
Adult Day Treatment Program:  Individualized Treatment Plan       Date of Plan: 21    Name: Joel Pineda MRN: [id number]    : 1975     Program: Adult Day Treatment Program (ADT)    Clinical Track: clinic 1+    DSM5 Diagnosis:   296.33 (F33.2) Major Depressive Disorder, Recurrent Episode, Severe.  4. Other Diagnoses that is relevant to services:   300.02 (F41.1) Generalized Anxiety Disorder.       55+ Multidisciplinary Team Members:  Dr Raza Leonard MD and/or Dr. Lorraine Patino, [facility], , and/or Dr. Jaycob Boland MD,  Magali Wayne, [facility], [facility]  Joel Pineda will participate in the Adult Day Treatment Program 1 day per week, 2 hours   Anticipated duration/discharge: 12 weeks    Due to COVID-19, services will be delivered via telemedicine until further notice.     Program Start Date: 21  Anticipated Discharge Date: 10/20/21 (pending authorization/clinical changes)    NOTE: Complete CGI     Review Date: Does Joel Pineda continue to meet criteria to participate in the ADT Program, 3 days per week; 3 hours per day?   21 yes   Client Strengths:  committed to sobriety, creative, educated, empathetic, has a previous history of therapy, insightful, intelligent, motivated, open to learning and work history    Client Participation in Plan:  Contributed to goals and plan   Attended individual treatment plan meeting on 21  Agrees with plan   Received copy of treatment plan   Discussed with staff     Areas of Vulnerability:  Suicidal Ideation   Anxiety  Depressive symptoms     Long-Term Goals:  Knowledge about illness and management of symptoms   Maintenance of personal safety     Abuse Prevention Plan:  Safe, therapeutic environment   Safety coping plan as needed   Education regarding illness and skill development   Coordination with care providers     Discharge Criteria:  Satisfactory progress toward treatment goals   Improvement re: identified problems and symptoms   Ability to  "continue recovery at next level of service   Has a discharge plan in place   Has safety/coping plan in place      Areas of Treatment Focus        Area of Treatment Focus:   Personal Safety  Start Date:    7/28/21    Goal:  Target Date: 9/15/21 Status stopped:Client will notify staff when needing assistance to develop or implement a coping plan to manage suicidal or self injurious urges.  Client will use coping plan for safety, as needed.      Progress:           Treatment Strategies:   Assess / reassess level of potential for harm to self or others  Engage in safety planning when indicated      Area of Treatment Focus:   Symptom Stabilization and Management  Start Date:    7/28/21    Goal:  Target Date: 9/15/21 Statusstopped  Will report on symptoms and identify skills to use to manage depression and anxiety.  Will identify current stressors and process emotion related to stressors.      Progress:           Treatment Strategies:   Assist to identify treatment goals  Facilitate increased self awareness  Teach adaptive coping skills and communication skills      Area of Treatment Focus:   Community Resources / Support and Discharge Planning  Start Date:    7/28/21Goal stopped    Goal:  Target Date: 9/15/21 Status: Will develop an aftercare / transition plan by establishing care with individual therapist . Will increase wellness related behaviors by exploring options for dietary support    Progress:           Treatment Strategies:   Assist clients in establishing / strengthening support network  Assist with discharge planning       Magali Wayne [facility]      NOTE: Required signatures are completed manually and scanned into the electronic medical record. See \"Media\" tab in epic.    The Individualized Treatment Plan Signature Page has been routed to the provider for co-sign.        "

## 2021-07-22 ENCOUNTER — HOSPITAL ENCOUNTER (OUTPATIENT)
Dept: BEHAVIORAL HEALTH | Facility: CLINIC | Age: 46
End: 2021-07-22
Attending: PSYCHIATRY & NEUROLOGY
Payer: MEDICARE

## 2021-07-22 ENCOUNTER — TELEPHONE (OUTPATIENT)
Dept: BEHAVIORAL HEALTH | Facility: CLINIC | Age: 46
End: 2021-07-22

## 2021-07-22 PROCEDURE — 99214 OFFICE O/P EST MOD 30 MIN: CPT | Mod: 95 | Performed by: NURSE PRACTITIONER

## 2021-07-22 NOTE — TELEPHONE ENCOUNTER
Pt return the phone call and left a message stating he would like to continue in the once per week group and level of care for now to see if that will meet his needs.  He asks to remain on the waiting list and to assess again in a few weeks when there might be another opening.  Writer left a message back agreeing with this plan.

## 2021-07-22 NOTE — TELEPHONE ENCOUNTER
Writer called Pt today to discuss a potential opening in an IOP track.  He started in Clinic yesterday as an intervention while waiting for IOP.  Writer left Pt a vm message to discuss options.
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
## 2021-07-23 NOTE — PROGRESS NOTES
Patient Active Problem List   Diagnosis     Major depressive disorder, recurrent episode (H)     Intermittent asthma     Hyperlipidemia LDL goal <100     Chronic nonallergic rhinitis     Diverticulosis     GERD (gastroesophageal reflux disease)     Anxiety     LLOYD (obstructive sleep apnea)- mild (AHI 11)     Intracranial arachnoid cyst     Facet arthritis of cervical region     Acquired hypothyroidism     Bipolar 2 disorder (H)     Chronic midline low back pain without sciatica     Irritable bowel syndrome with diarrhea     B12 deficiency     Essential hypertension with goal blood pressure less than 140/90     Chronic, continuous use of opioids     Ankylosing spondylitis of sacral region (H)     Morbid obesity due to hypertriglyceridemia (H)     Fatty infiltration of liver     DDD (degenerative disc disease), lumbar     Type 2 diabetes mellitus with complication, without long-term current use of insulin (H)     Peripheral polyneuropathy     History of pulmonary embolism     Ingrown toenail     Hypertriglyceridemia     Gastroparesis     Orthostatic dizziness     POTS (postural orthostatic tachycardia syndrome)     PHN (postherpetic neuralgia)     Dysautonomia (H)     Major depressive disorder, recurrent episode, severe (H)     Chronic pain syndrome     MDD (major depressive disorder), recurrent severe, without psychosis (H)       Current Outpatient Medications:      acetaminophen 500 MG CAPS, Take 1,000 mg by mouth 2 times daily , Disp: 60 capsule, Rfl:      albuterol (PROAIR HFA/PROVENTIL HFA/VENTOLIN HFA) 108 (90 Base) MCG/ACT inhaler, Inhale 2 puffs into the lungs every 4 hours as needed for shortness of breath / dyspnea or wheezing, Disp: 8.5 g, Rfl: 11     aspirin (ASA) 81 MG tablet, Take 81 mg by mouth daily , Disp: , Rfl:      baclofen (LIORESAL) 10 MG tablet, Take 0.5-1 tablets (5-10 mg) by mouth 2 times daily Needs follow up with Marcie before any further refills. DV, Disp: 60 tablet, Rfl: 0     cetirizine  (ZYRTEC) 10 MG tablet, Take 1 tablet (10 mg) by mouth At Bedtime, Disp: 30 tablet, Rfl: 11     cholecalciferol (D3-50) 1250 mcg (65003 units) capsule, TAKE 1 CAPSULE BY MOUTH EVERY 2 WEEKS, Disp: 24 capsule, Rfl: 0     clonazePAM (KLONOPIN) 0.5 MG tablet, Take 0.5 mg by mouth 2 times daily as needed , Disp: , Rfl:      cyanocobalamin (CYANOCOBALAMIN) 1000 MCG/ML injection, Inject 1 mL (1,000 mcg) into the muscle every 30 days, Disp: 10 mL, Rfl: 0     DULoxetine (CYMBALTA) 60 MG capsule, Take 120 mg by mouth daily , Disp: , Rfl:      EPINEPHrine (ANY BX GENERIC EQUIV) 0.3 MG/0.3ML injection 2-pack, Inject 0.3 mLs (0.3 mg) into the muscle once as needed for anaphylaxis, Disp: 0.6 mL, Rfl: 3     eszopiclone (LUNESTA) 2 MG tablet, Take 2 mg by mouth At Bedtime, Disp: , Rfl:      etanercept (ENBREL SURECLICK) 50 MG/ML autoinjector, Inject 50 mg Subcutaneous once a week . Hold for signs of infection, and seek medical attention., Disp: 4 mL, Rfl: 12     famotidine (PEPCID) 20 MG tablet, Prior to administration of Humira every 2 weeks (Patient taking differently: Take 20 mg by mouth every 7 days Prior to Enbrel Injections to prevent skin reaction), Disp: , Rfl:      fluticasone (FLONASE) 50 MCG/ACT nasal spray, Spray 1 spray into both nostrils daily, Disp: , Rfl:      fluticasone-salmeterol (ADVAIR) 250-50 MCG/DOSE inhaler, Inhale 1 puff into the lungs every 12 hours, Disp: 14 each, Rfl: 11     fluticasone-salmeterol (ADVAIR) 500-50 MCG/DOSE inhaler, Inhale 1 puff into the lungs every 12 hours, Disp: 3 each, Rfl: 11     glipiZIDE (GLUCOTROL XL) 2.5 MG 24 hr tablet, Take 1 tablet (2.5 mg) by mouth daily, Disp: 90 tablet, Rfl: 0     lamoTRIgine (LAMICTAL) 100 MG tablet, Take 200 mg by mouth daily, Disp: , Rfl:      levothyroxine (SYNTHROID/LEVOTHROID) 75 MCG tablet, TAKE 1 TABLET EVERY MORNING, Disp: 90 tablet, Rfl: 3     lidocaine (XYLOCAINE) 5 % external ointment, Apply topically 4 times daily as needed (pain), Disp: 50 g,  Rfl: 2     melatonin 3 MG tablet, Take 12 mg by mouth nightly as needed , Disp: , Rfl:      metoclopramide (REGLAN) 5 MG tablet, Take 5 mg by mouth 2 times daily, Disp: , Rfl:      metoprolol succinate ER (TOPROL-XL) 200 MG 24 hr tablet, TAKE 1 TABLET BY MOUTH TWICE DAILY , Disp: 180 tablet, Rfl: 0     montelukast (SINGULAIR) 10 MG tablet, Take 1 tablet (10 mg) by mouth every evening, Disp: 90 tablet, Rfl: 3     nabumetone (RELAFEN) 500 MG tablet, Take 1-2 tablets (500-1,000 mg) by mouth 2 times daily as needed for moderate pain (with food), Disp: 120 tablet, Rfl: 2     naloxone (NARCAN) 4 MG/0.1ML nasal spray, Spray 1 spray (4 mg) into one nostril alternating nostrils as needed for opioid reversal every 2-3 minutes until assistance arrives, Disp: 0.2 mL, Rfl: 0     olopatadine (PATADAY) 0.2 % ophthalmic solution, Place 1 drop into both eyes daily, Disp: 2.5 mL, Rfl: 3     omega-3 acid ethyl esters (LOVAZA) 1 g capsule, TAKE 2 CAPSULES BY MOUTH TWICE DAILY , Disp: 360 capsule, Rfl: 1     omeprazole 20 MG tablet, Take 20 mg by mouth daily , Disp: , Rfl:      ondansetron (ZOFRAN-ODT) 8 MG ODT tab, Take 8 mg by mouth every 8 hours as needed for nausea, Disp: , Rfl:      order for DME, Equipment being ordered: Assure Compression stockings (ANNETTA) Large, closed toe, thigh-high 30-40 compression, Disp: 2 Units, Rfl: 3     order for DME, Equipment being ordered: lumbosacral belt/brace, Disp: 1 Units, Rfl: 0     order for DME, Respironics REMSTAR 60 Series Auto CPAP 9-13 cm H2O, Wisp nasal mask w/a large cushion and a chinstrap, Disp: , Rfl:      oxyCODONE (ROXICODONE) 5 MG tablet, Take 1 tablet (5 mg) by mouth every 6 hours as needed for pain (maximum 6 tablet(s) per day) . Acute on chronic pain, Disp: 35 tablet, Rfl: 0     polyethylene glycol (MIRALAX) 17 g packet, Take 1 packet by mouth daily, Disp: , Rfl:      pregabalin (LYRICA) 150 MG capsule, Take 1 capsule (150 mg) by mouth 3 times daily, Disp: 90 capsule, Rfl: 5      "QUEtiapine (SEROQUEL) 100 MG tablet, Take 100 mg by mouth At Bedtime, Disp: , Rfl:      ramipril (ALTACE) 10 MG capsule, Take 1 capsule (10 mg) by mouth daily, Disp: 90 capsule, Rfl: 1     rizatriptan (MAXALT-MLT) 5 MG ODT, DISSOLVE 1 TABLET BY MOUTH AT ONSET OF HEADACHE, Disp: 30 tablet, Rfl: 0     rosuvastatin (CRESTOR) 40 MG tablet, Take 1 tablet (40 mg) by mouth daily, Disp: 90 tablet, Rfl: 2     syringe, disposable, (BD TUBERCULIN SYRINGE) 1 ML MISC, Equipment being ordered: 1 ml tuberculin syringes to be used for Vitamin B12 injections., Disp: 12 each, Rfl: 11     syringe/needle, disp, (BD INTEGRA SYRINGE) 25G X 1\" 3 ML MISC, USE FOR VITAMIN B12 INJECTIONS, Disp: 12 each, Rfl: 0     triamcinolone (KENALOG) 0.1 % external cream, Apply topically 2 times daily as needed for irritation, Disp: 30 g, Rfl: 2     vitamin B complex with vitamin C (STRESS TAB) tablet, Take 1 tablet by mouth daily, Disp: , Rfl:      ZINC SULFATE-VITAMIN C MT, Take 1 tablet by mouth daily, Disp: , Rfl:     Current Facility-Administered Medications:      fentaNYL (PF) (SUBLIMAZE) injection 12.5-25 mcg, 12.5-25 mcg, Intravenous, Q5 Min PRN, Elaine Diaz MD, 25 mcg at 05/26/21 1111     midazolam (VERSED) injection 0.5-2 mg, 0.5-2 mg, Intravenous, Q5 Min PRN, Elaine Diaz MD, 2 mg at 05/26/21 1046  Psychiatry staffing: case discussed  Diagnosis:  As above; mood illness either MDD or Bipolar 2.  Recent change to Aftercare Clinic.  "

## 2021-07-27 ENCOUNTER — OFFICE VISIT (OUTPATIENT)
Dept: PALLIATIVE MEDICINE | Facility: CLINIC | Age: 46
End: 2021-07-27
Payer: MEDICARE

## 2021-07-27 DIAGNOSIS — E08.42 DIABETIC POLYNEUROPATHY ASSOCIATED WITH DIABETES MELLITUS DUE TO UNDERLYING CONDITION (H): ICD-10-CM

## 2021-07-27 DIAGNOSIS — M62.838 MUSCLE SPASM: ICD-10-CM

## 2021-07-27 DIAGNOSIS — M51.369 DDD (DEGENERATIVE DISC DISEASE), LUMBAR: ICD-10-CM

## 2021-07-27 DIAGNOSIS — M72.2 PLANTAR FASCIITIS: ICD-10-CM

## 2021-07-27 DIAGNOSIS — M45.8 ANKYLOSING SPONDYLITIS OF SACRAL REGION (H): ICD-10-CM

## 2021-07-27 DIAGNOSIS — K31.84 GASTROPARESIS: ICD-10-CM

## 2021-07-27 DIAGNOSIS — E66.01 MORBID OBESITY (H): ICD-10-CM

## 2021-07-27 DIAGNOSIS — G89.4 CHRONIC PAIN SYNDROME: Primary | ICD-10-CM

## 2021-07-27 DIAGNOSIS — E11.8 TYPE 2 DIABETES MELLITUS WITH COMPLICATION, WITHOUT LONG-TERM CURRENT USE OF INSULIN (H): ICD-10-CM

## 2021-07-27 PROCEDURE — 99215 OFFICE O/P EST HI 40 MIN: CPT | Performed by: NURSE PRACTITIONER

## 2021-07-27 RX ORDER — METHOCARBAMOL 500 MG/1
500-1000 TABLET, FILM COATED ORAL 4 TIMES DAILY PRN
Qty: 240 TABLET | Refills: 1 | Status: SHIPPED | OUTPATIENT
Start: 2021-07-27 | End: 2021-11-16

## 2021-07-27 ASSESSMENT — PAIN SCALES - GENERAL: PAINLEVEL: MILD PAIN (3)

## 2021-07-27 ASSESSMENT — MIFFLIN-ST. JEOR: SCORE: 2472.9

## 2021-07-27 NOTE — PATIENT INSTRUCTIONS
After Visit Instructions:     Thank you for coming to [facility] for your care. It is my goal to partner with you to help you reach your optimal state of health.     Continue daily self-care, identifying contributing factors, and monitoring variations in pain level. Continue to integrate self-care into your life.      Be sure to request ALL medication refills 5 days prior to the due date unless you will see your medical provider in an appointment before the due date.     1. Virtual follow-up with JOCELYN Zavala NP-C in 2-3 months or sooner as needed  2. Other: [facility] will re-certify you for medical cannabis   3. Medication Management :   1. Methocarbamol 500 m-2 tabs up to 4 times day as needed.      JOCELYN Padgett NP-C  [facility]  [facility]    Clinic Number:  [phone]    After Hours On-Call Service:  [phone]      Call with any questions about your care and for scheduling assistance.     Calls are returned Monday through Friday between 8 AM and 4:30 PM. We usually get back to you within 2 business days depending on the issue/request.    If we are prescribing your medications:    For opioid medication refills, call the clinic or send a CloudOne message 7 days in advance.  Please include:    Name of requested medication    Name of the pharmacy.    For non-opioid medications, call your pharmacy directly to request a refill. Please allow 3-4 days to be processed.     Per [place] State Law:    All controlled substance prescriptions must be filled within 30 days of being written.      For those controlled substances allowing refills, pickup must occur within 30 days of last fill.      We believe regular attendance is key to your success in our program!      Any time you are unable to keep your appointment we ask that you call us at least 24 hours in advance to cancel.This will allow us to offer the appointment time to another patient.    Multiple missed appointments may lead to dismissal from the clinic.

## 2021-07-27 NOTE — PROGRESS NOTES
"[facility]    CHIEF COMPLAINT: Multiple chronic pain concerns.     INTERVAL HISTORY:  Last seen on 2/15/21.        Recommendations/plan at the last visit included:  1. Schedule pain psychology assessment/visits as recommended by your health psychologist  2. Schedule physical therapy assessment/visits as recommended by your physical therapist  3. Schedule VIDEO  follow-up with JOCELYN Zavala NP-C in 1 month PRIOR to any refills that will be due at that time.   4. Medication recommendations:             1.  No change to current medications.  Baclofen refilled.    Since last visit:   - Medical cannabis is very beneficial for gastroparesis and somewhat helpful for back pain. Takes a 50/50 CBD/THC blend tincture.   - Has REM sleep disorder which causes him to \"act out\", Acting out his dreams.  - Baclofen helps some what but continues to have spasms.   - Plantar fasciitis: has a plethora of treatments but still has pain (5/10), prevents walking long distances, neck/shoulders/low back pain (3/10) had a lumbar facet RFA which provided about 80% pain relief, anal fissure (5/10) reopened and not healing, using topicals TID.     Pain Information today: Mild Pain (3) /10.  Location:multiple pain sources.     Annual Requirements last collected:  N/A     Current Pain Relevant Medications:    Acetaminophen 500 mg BID & with Oxycodone.   Cymbalta 120 mg in AM  Oxycodone 5 mg 1-2 tabs per day PRN   Total opiate dose: 7.5-15 MME  Lyrica 150 mg BID  Methocarbamol 500 mg 1-2 QID PRN  Nabumetone 500 mg 1-2 times a day  Lidocaine gel topically 2-3 times daily  Clonazepam 0.5 mg at HS. Occasionally 1 tab during the day.    Previous Pain Relevant Medications: (H--helped; HI--Helped initially; SWH--Somewhat helpful; NH--No help; W--worse; SE--side effects; ?--Unsure if helpful)   NOTE: This medication information taken from patient's intake form, not medical records.    Opiates: Oxycodone: H, " Tramadol:[employer]. SE, Codeine: NH   NSAIDS: Celebrex: [employer], Nabumetone:H, Naproxen: SE   Anti-migraine medications: Midrin? , Maxalt:H   Muscle Relaxants: Baclofen:[facility] [employer], Flexeril:H, Tizaidine:?, Methocarbamol:?   Neuropathics: Lyrica:H  Anti-depressants: Abilify:SE, Brintellix: NH, Wellbutrin: ?, Cymbalta: NH, Nortriptyline: NH, Seroquel:  [facility], Risperdal: NH, Effexor:?, Cymbalta ?   Anxiety medications: Xanax: H, Klonpin: H, lorazepam: H    Topicals: Lidocaine:H   Sleep medications:Belsomra: NH, Melatonin:H, Trazodone NH, Ambien:NH   Other medications not covered above: Humira: All, Enbrel; H, dicyclomine:H, Medrol:[facility] [facility] [employer], Prednisone:H  This will be added at a later date when new patient packet is available.      Any illicit drug use: denies   EtOH use: none   Caffeine use: 6-8 per day   Nicotine use: None    Past Pain Treatments:               Pain Clinic:   Yes    [facility] pain management: For spinal injections with Dr Diaz, [facility]: injections, nerve ablation, Can[facility] Spine for SCS treatment.  Did trial but did not find it beneficial.   [facility] chronic pain program.                   PT: Yes  For other reasons. Neck, back and feet              Psychologist: Yes ongoing with private therapist.at  [facility].               Chiropractor: Yes years ago              Acupuncture: Yes NH              Pharmacotherapy:                           Opioids: Yes                            Non-opioids:    Yes               TENs Unit:Yes H for back               Injections: Yes Many for neck and back               Surgeries related to pain: Yes               October 2007 L5-S1 laminectomy, micro discectomy         THE 4 As OF OPIOID MAINTENANCE ANALGESIA    Analgesia: Is pain relief clinically significant? YES   Activity: Is patient functional and able to perform Activities of Daily Living? YES   Adverse effects: Is patient free from adverse side effects from opiates? YES   Adherence to Rx protocol: Is patient adhering to Controlled  "Substance Agreement and taking medications ONLY as ordered? YES     Is Narcan prescribed for opiate use >50 MME daily or concurrent use of opiates and benzodiazepines?  Yes prescribed by this writer 8/10/2020    [place] Board of Pharmacy Data Base Reviewed:    YES; No concern for abuse or misuse of controlled medications based on this report. Reviewed [facility] July 26, 2021- no concerning fills.      PHYSICAL EXAM    Vitals:    07/27/21 1326   BP: 125/83   Pulse: 66   SpO2: 94%     Height: 6'6\"   Weight: 324 pounds  BMI: 37.44    Constitutional: healthy, alert and no distress A&O. Patient is appropriate.  Psychiatric/mental status: Alert, without lethargy or stupor. Appropriate affect.    Neurologic exam:  CN:  Cranial nerves 2-12 are grossly normal.    MUSCULOSKELETAL:     Posture: Upright, shoulders and pelvis are leveled. No  Gait pattern: Patient has antalgic gait.  Yes bilateral       DIRE Score for ongoing opioid management is calculated as follows:    Diagnosis = 3 pts (advanced condition; severe pain/objective findings)    Intractability = 3 pts (patient fully engaged but inadequate response to treatments)    Risk        Psych = 3 pts (no significant personality dysfunction/mental illness; good communication with clinic)         Chem Hlth = 3 pts (no history of chemical dependency; not drug-focused)       Reliability = 2 pts (occasional difficulties with compliance; generally reliable)       Social = 2 pts (reduction in some relationships/life rolls)       (Psych + Chem hlth + Reliability + Social) = 16    Efficacy = 2 pts (moderate benefit/function; low med dose; too early/not tried meds)    DIRE Score = 18        7-13: likely NOT suitable candidate for long-term opioid analgesia       14-21: may be a suitable candidate for long-term opioid analgesia     Previous Diagnostic Tests:   Imaging Studies:   MR LUMBAR SPINE W/O CONTRAST 6/27/2019  Impression:   1. Multilevel lumbar spondylosis, most pronounced at " L5-S1 with  moderate to severe left and moderate right neural foraminal stenosis as well as narrowing of the left lateral recess and abutment the traversing left S1 nerve root.   2. Additional multilevel degenerative changes of the lumbar spine as described above.    PAIN RELEVANT CONDITIONS:   1.  Postherpetic neuralgia  2.  Ankylosing spondylosis, Lumbar DDD with left S1 nerve involvement, lumbar stenosis  3.  Chronic migraine headaches  4.  History: Complex medical issues, see history    DIAGNOSIS AND PLAN:     (G89.4) Chronic pain syndrome  (primary encounter diagnosis)  (K31.84) Gastroparesis  (M45.8) Ankylosing spondylitis of sacral region (H)  (M51.36) DDD (degenerative disc disease), lumbar  (E08.42) Diabetic polyneuropathy associated with diabetes mellitus due to underlying condition (H)  (M72.2) Plantar fasciitis  Comment: Tito may be interested in medical cannabis as an adjunct to his current medication regimen.  Oxycodone, Lunesta, clonazepam are currently prescribed by PCP and psychiatry.  Given his mental health conditions including major disorder and bipolar type 2 disorder I would need consent to both primary care and psychiatrist before we could certify him for medical cannabis.  Will work on weight reduction and increasing physical activity with physical therapy.  Plan: Weight management, current medication regimen including was prescribed at this appointment, physical therapy.    (E66.01) Morbid obesity (H)  Comment: Discussed obesity as a significant contributor to his generalized pain as well as his other health issues: Type 2 diabetes, asthma, GERD,  fatty liver, hypertriglyceridemia, LLOYD plantar fasciitis.  Plan: Comprehensive Weight Management, Physical therapy    (M62.948) Muscle spasm  Comment: Reviewed taking muscle relaxers regularly provides better results than intermittently.  Plan: methocarbamol (ROBAXIN) 500 MG tablet, Physical        Therapy Referral      PATIENT INSTRUCTIONS:      Diagnosis reviewed, treatment option addressed, and risk/benefits discussed.  Self-care instructions given.  I am recommending a multidisciplinary treatment plan to help this patient better manage pain.    Remember to request ALL medication refills 5 days before you run out.       1. Virtual follow-up with JOCELYN Zavala, NP-C in 2-3 months or sooner as needed  2. Other: [facility] will re-certify you for medical cannabis   3. Medication Management :   1. Methocarbamol 500 m-2 tabs up to 4 times day as needed.     I have reviewed the note as documented above.  This accurately captures the substance of my conversation with the patient.  A total of 60 minutes of preparation, care, and consultation were spent on this visit today.     JOCELYN Padgett, NP-C  [facility] [place]

## 2021-07-28 ENCOUNTER — HOSPITAL ENCOUNTER (OUTPATIENT)
Dept: BEHAVIORAL HEALTH | Facility: CLINIC | Age: 46
End: 2021-07-28
Attending: PSYCHIATRY & NEUROLOGY
Payer: MEDICARE

## 2021-07-28 PROCEDURE — 90853 GROUP PSYCHOTHERAPY: CPT | Mod: PO

## 2021-07-28 NOTE — GROUP NOTE
"Process Group Note    PATIENT'S NAME: Joel Pineda  MRN:   [id number]  :   1975  ACCT. NUMBER: [id number]  DATE OF SERVICE: 21  START TIME:  1:00 PM  END TIME:  1:50 PM  FACILITATOR: Domi Cadet; Magali Dodson LICSW  TOPIC:  Process Group    Diagnoses:  296.33 (F33.2) Major Depressive Disorder, Recurrent Episode, Severe.  4. Other Diagnoses that is relevant to services:   300.02 (F41.1) Generalized Anxiety Disorder.    [facility] Adult Mental Health Day Treatment  TRACK: Clinic 1+    NUMBER OF PARTICIPANTS: 3                                      Service Modality:  Video Visit     Telemedicine Visit: The patient's condition can be safely assessed and treated via synchronous audio and visual telemedicine encounter.      Reason for Telemedicine Visit: Services only offered telehealth    Originating Site (Patient Location): Patient's home    Distant Site (Provider Location): Provider Remote Setting- Home Office    Consent:  The patient/guardian has verbally consented to: the potential risks and benefits of telemedicine (video visit) versus in person care; bill my insurance or make self-payment for services provided; and responsibility for payment of non-covered services.     Patient would like the video invitation sent by:  My Chart    Mode of Communication:  Video Conference via Medical Zoom    As the provider I attest to compliance with applicable laws and regulations related to telemedicine.                Data:    Session content: At the start of this group, patients were invited to check in by identifying themselves, describing their current emotional status, and identifying issues to address in this group.   Area(s) of treatment focus addressed in this session included Symptom Management, Personal Safety and Community Resources/Discharge Planning.  Client presents in a pleasant mood. Affect congruent. Reports his appointment at his pain management clinic \"went well.\" He reports " feeling glad to attend for an in-person appointment. Reports he felt heard. Also reports feeling overwhelmed physically and mentally and has decided to increase one of his meds that his doctor suggested. He will start the increase as doctor instructed and check in with his doctor this week to let them know.    Therapeutic Interventions/Treatment Strategies:  Psychotherapist offered support, feedback and validation and reinforced use of skills. Treatment modalities used include Motivational Interviewing and Cognitive Behavioral Therapy. Interventions include Symptoms Management: Promoted understanding of their diagnoses and how it impacts their functioning.    Assessment:    Patient response:   Patient responded to session by accepting feedback, giving feedback, listening and being attentive    Possible barriers to participation / learning include: and no barriers identified    Health Issues:   Yes: Pain, Physical/Mental-attends pain clinic       Substance Use Review:   Substance Use: No active concerns identified.    Mental Status/Behavioral Observations  Appearance:   Appropriate   Eye Contact:   Good   Psychomotor Behavior: Normal   Attitude:   Cooperative   Orientation:   All  Speech   Rate / Production: Normal    Volume:  Normal   Mood:    Normal  Affect:    Appropriate   Thought Content:   Clear  Thought Form:  Coherent  Logical     Insight:    Good     Plan:     Safety Plan: No current safety concerns identified.  Recommended that patient call 911 or go to the local ED should there be a change in any of these risk factors.     Barriers to treatment: None identified    Patient Contracts (see media tab):  None    Substance Use: Not addressed in session     Continue or Discharge: Patient will continue in Adult Day Treatment (ADT)  as planned. Patient is likely to benefit from learning and using skills as they work toward the goals identified in their treatment plan.      Domi Cadet  July 28, 2021

## 2021-07-29 NOTE — PROGRESS NOTES
Acknowledgement of Current Treatment Plan       I have reviewed my treatment plan with my therapist on 7/ 28/21.   I agree with the plan as it is written in the electronic health record. (4A)    Name:      Signature:  Joel Pineda   Unable to sign covid19   Dr Raza Leonard MD  Psychiatrist    Magali Wayne, [facility], BC-[facility]  Psychotherapist Magali Wayne, SCOTT, BC-DMT

## 2021-07-29 NOTE — ADDENDUM NOTE
Encounter addended by: Magali Wayne LICSW on: 7/29/2021 10:48 AM   Actions taken: Clinical Note Signed

## 2021-07-30 ENCOUNTER — TELEPHONE (OUTPATIENT)
Dept: BEHAVIORAL HEALTH | Facility: CLINIC | Age: 46
End: 2021-07-30

## 2021-07-30 NOTE — TELEPHONE ENCOUNTER
"RN Review of Medical History / Physical Health Screen  Outpatient Mental Health Programs - [facility] Adult Mental Health Day Treatment    PATIENT'S NAME: Joel Pineda  MRN:   [id number]  :   1975  ACCT. NUMBER: [id number]  CURRENT AGE:  46 year old    DATE OF DIAGNOSTIC ASSESSMENT: 21  DATE OF ADMISSION: 21     Please see Diagnostic Assessment for additional Medical History.     General Health:   Have you had any exposure to any communicable disease in the past 2-3 weeks? no     Are you aware of safe sex practices? yes       Nutrition:    Are you on a special diet? If yes, please explain:  yes has gastroparesis; low fiber, several smaller meals throughout the day   Do you have any concerns regarding your nutritional status? If yes, please explain:  yes at pain clinic and made referral to weight clinic   Have you had any appetite changes in the last 3 months?  Yes, has to drink extra liquids makes him woozy, sees [facility] and other clinics     Have you had any weight loss or weight gain in the last 3 months?  Yes, how much? Reduction, about 20 pounds (r/t dietary changes)     Do you have a history of an eating disorder? no   Do you have a history of being in an eating disorder program? no     Patient height and weight recorded by RN in epic flowsheet: no No; Unable to measure  Because of temporary in-person programmatic suspension due to COVID-19 pandemic, all pt weights and heights will be collected through patient self-report an recorded in physical health screening progress note upon admission to the program.                            Height/Weight Review:  Patient reported height:     6'6\"   Patient reports weight:  Date last checked:  320 pounds; a couple days ago   Any referrals/needs identified?                BMI Review:  Was the patient informed of BMI? no      Findings See above         Fall Risk:   Have you had any falls in the past 3 months? no     Do you currently use " any assistive devices for mobility?   no      Additional Comments/Assessment: Has some balance and dizziness concerns (wear abdominal binder, stands slowly); denies seizures  Has psych provider[facility]; looking for ACT therapist    Per completion of the Medical History / Physical Health Screen, is there a recommendation to see / follow up with a primary care physician/clinic or dentist?    No.      Sandy Huerta RN  7/30/2021

## 2021-08-01 ENCOUNTER — MYC REFILL (OUTPATIENT)
Dept: FAMILY MEDICINE | Facility: CLINIC | Age: 46
End: 2021-08-01

## 2021-08-01 VITALS
HEIGHT: 78 IN | WEIGHT: 315 LBS | SYSTOLIC BLOOD PRESSURE: 125 MMHG | DIASTOLIC BLOOD PRESSURE: 83 MMHG | OXYGEN SATURATION: 94 % | BODY MASS INDEX: 36.45 KG/M2 | HEART RATE: 66 BPM

## 2021-08-01 DIAGNOSIS — M51.369 DDD (DEGENERATIVE DISC DISEASE), LUMBAR: ICD-10-CM

## 2021-08-01 DIAGNOSIS — M45.8 ANKYLOSING SPONDYLITIS OF SACRAL REGION (H): ICD-10-CM

## 2021-08-01 DIAGNOSIS — M47.816 LUMBAR FACET ARTHROPATHY: ICD-10-CM

## 2021-08-02 RX ORDER — OXYCODONE HYDROCHLORIDE 5 MG/1
5 TABLET ORAL EVERY 6 HOURS PRN
Qty: 35 TABLET | Refills: 0 | Status: SHIPPED | OUTPATIENT
Start: 2021-08-02 | End: 2021-08-24

## 2021-08-02 NOTE — TELEPHONE ENCOUNTER
Routing refill request to provider for review/approval because:  Drug not on the [facility] refill protocol       Ankita Guerra RN, BSN   [facility]

## 2021-08-03 ENCOUNTER — TRANSFERRED RECORDS (OUTPATIENT)
Dept: HEALTH INFORMATION MANAGEMENT | Facility: CLINIC | Age: 46
End: 2021-08-03

## 2021-08-04 ENCOUNTER — HOSPITAL ENCOUNTER (OUTPATIENT)
Dept: BEHAVIORAL HEALTH | Facility: CLINIC | Age: 46
End: 2021-08-04
Attending: PSYCHIATRY & NEUROLOGY
Payer: MEDICARE

## 2021-08-04 PROCEDURE — 90853 GROUP PSYCHOTHERAPY: CPT | Mod: GT | Performed by: SOCIAL WORKER

## 2021-08-04 NOTE — GROUP NOTE
"Process Group Note    PATIENT'S NAME: Joel Pineda  MRN:   [id number]  :   1975  ACCT. NUMBER: [id number]  DATE OF SERVICE: 21  START TIME:  1:00 PM  END TIME:  2:50 PM  FACILITATOR: Magali Wayne LICSW  TOPIC:  Process Group    Diagnoses:   296.33 (F33.2) Major Depressive Disorder, Recurrent Episode, Severe.  4. Other Diagnoses that is relevant to services:   300.02 (F41.1) Generalized Anxiety Disorder.      [facility] Day Treatment  TRACK: clinic1+    NUMBER OF PARTICIPANTS: 2                                    Service Modality:  Video Visit     Telemedicine Visit: The patient's condition can be safely assessed and treated via synchronous audio and visual telemedicine encounter.      Reason for Telemedicine Visit: Services only offered telehealth and covid19    Originating Site (Patient Location): Patient's home    Distant Site (Provider Location): Provider Remote Setting- Home Office    Consent:  The patient/guardian has verbally consented to: the potential risks and benefits of telemedicine (video visit) versus in person care; bill my insurance or make self-payment for services provided; and responsibility for payment of non-covered services.     Patient would like the video invitation sent by:  My Chart    Mode of Communication:  Video Conference via Medical Zoom    As the provider I attest to compliance with applicable laws and regulations related to telemedicine.               Data:    Session content: At the start of this group, patients were invited to check in by identifying themselves, describing their current emotional status, and identifying issues to address in this group.   Area(s) of treatment focus addressed in this session included Symptom Management and Personal Safety.  Tito states that he is having increased difficulty with adls and is wondering about increase in level of care.  He asked for the groups help in coping with [place] and \"waking up the " "senses\".    Therapeutic Interventions/Treatment Strategies:  Psychotherapist offered support, feedback and validation and reinforced use of skills. Treatment modalities used include Motivational Interviewing, Cognitive Behavioral Therapy and Dialectical Behavioral Therapy. Interventions include Behavioral Activation: Explored how behaviors effect mood and interact with thoughts and feelings and Coping Skills: Facilitated understanding of  what factors may contribute to symptom relapse and skills plan to manage symptom relapse  and Addressed barriers to utilizing coping skills when in distress.    Assessment:    Patient response:   Patient responded to session by accepting feedback, giving feedback, listening, focusing on goals, being attentive, accepting support, appearing alert, demonstrating behavior change and verbalizing understanding    Possible barriers to participation / learning include: and no barriers identified    Health Issues:   None reported       Substance Use Review:   Substance Use: No active concerns identified.    Mental Status/Behavioral Observations  Appearance:   Appropriate   Eye Contact:   Good   Psychomotor Behavior: Normal   Attitude:   Cooperative   Orientation:   All  Speech   Rate / Production: Normal    Volume:  Normal   Mood:    Depressed  Normal  Affect:    Appropriate  Blunted   Thought Content:   Clear and Rumination  Thought Form:  Coherent  Goal Directed  Logical     Insight:    Good     Plan:     Safety Plan: No current safety concerns identified.  Recommended that patient call 911 or go to the local ED should there be a change in any of these risk factors.     Barriers to treatment: None identified    Patient Contracts (see media tab):  None    Substance Use: Not addressed in session     Continue or Discharge: Patient will continue in Adult Day Treatment (ADT)  as planned. Patient is likely to benefit from learning and using skills as they work toward the goals identified in " their treatment plan.      Magali Wayne, [facility]  August 4, 2021

## 2021-08-08 DIAGNOSIS — E11.9 TYPE 2 DIABETES MELLITUS WITHOUT COMPLICATION, WITHOUT LONG-TERM CURRENT USE OF INSULIN (H): ICD-10-CM

## 2021-08-09 ENCOUNTER — TELEPHONE (OUTPATIENT)
Dept: FAMILY MEDICINE | Facility: CLINIC | Age: 46
End: 2021-08-09

## 2021-08-09 NOTE — TELEPHONE ENCOUNTER
"PA for lidocaine (XYLOCAINE) 5 % external ointment    Login to [email or web address] and click \"Enter a Key\"    Key:  [id number]    Enter patients last name and     Complete the PA and click \"Send to Plan\" for approval.    PA or alternative    Farida James  "

## 2021-08-10 ENCOUNTER — OFFICE VISIT (OUTPATIENT)
Dept: PODIATRY | Facility: CLINIC | Age: 46
End: 2021-08-10
Payer: MEDICARE

## 2021-08-10 VITALS
WEIGHT: 315 LBS | BODY MASS INDEX: 37.56 KG/M2 | HEART RATE: 76 BPM | DIASTOLIC BLOOD PRESSURE: 80 MMHG | SYSTOLIC BLOOD PRESSURE: 126 MMHG

## 2021-08-10 DIAGNOSIS — M72.2 PLANTAR FASCIITIS: Primary | ICD-10-CM

## 2021-08-10 PROCEDURE — 29540 STRAPPING ANKLE &/FOOT: CPT | Mod: 59 | Performed by: PODIATRIST

## 2021-08-10 PROCEDURE — 99213 OFFICE O/P EST LOW 20 MIN: CPT | Mod: 25 | Performed by: PODIATRIST

## 2021-08-10 PROCEDURE — 20550 NJX 1 TENDON SHEATH/LIGAMENT: CPT | Performed by: PODIATRIST

## 2021-08-10 RX ORDER — GLIPIZIDE 2.5 MG/1
TABLET, EXTENDED RELEASE ORAL
Qty: 90 TABLET | Refills: 0 | Status: SHIPPED | OUTPATIENT
Start: 2021-08-10 | End: 2021-09-17

## 2021-08-10 NOTE — PATIENT INSTRUCTIONS
We wish you continued good healing. If you have any questions or concerns, please do not hesitate to contact us at [phone]    [facility] (secure e-mail communication and access to your chart) to send a message or to make an appointment.    Please remember to call and schedule a follow up appointment if one was recommended at your earliest convenience.     +++OF MARCH 2020+++ LOCATION AND HOURS HAVE CHANGED    PLEASE CALL CLINICS TO VERIFY DAYS AND TIMES  PODIATRY CLINIC HOURS  TELEPHONE NUMBER    Dr. Peng UMA[facility]FRANCISCO [facility]        Clinics:  [facility]   Tuesday 1PM-6PM  [place]  Wednesday 745AM-330PM  [place]  Thursday/Friday 745AM-230PM  [place] [facility] APPOINTMENTS  [phone]    [place] APPOINTMENTS  [phone]          If you need a medication refill, please contact us you may need lab work and/or a follow up visit prior to your refill (i.e. Antifungal medications).    If MRI needed please call Imaging at [phone] or [phone]    HOW DO I GET MY KNEE SCOOTER? Knee scooters can be picked up at ANY Medical Supply stores with your knee scooter Prescription.  OR    Bring your signed prescription to an [facility] showroom.

## 2021-08-10 NOTE — TELEPHONE ENCOUNTER
Routing refill request to provider for review/approval because:  Labs out of range:    Creatinine   Date Value Ref Range Status   10/16/2020 1.28 (H) 0.66 - 1.25 mg/dL Final     Yohana Torres RN

## 2021-08-10 NOTE — TELEPHONE ENCOUNTER
PRIOR AUTHORIZATION DENIED    Medication: lidocaine (XYLOCAINE) 5 % external ointment--DENIED    Denial Date: 8/10/2021    Denial Rational: Per insurance plan diagnosis is an off label use    Appeal Information:

## 2021-08-10 NOTE — LETTER
8/10/2021         RE: Joel Granadosy  [address] [facility] [place] [address]        Dear Colleague,    Thank you for referring your patient, Joel Pineda, to the [facility]. Please see a copy of my visit note below.    Subjective:    2/11/21   Patient is seen today as a new pt self referral with a several month(s) hx of bilateral heel pain.  Points to the plantarmedial calcaneal tubercle of calcaneus and also gets pain on the medial band of plantar fascia in his arch.  Aggravated by activity and relieved by rest.  Has history of bilateral subsecond capsulitis.  He has been going to physical therapy and they are doing ultrasound on this and he states it has helped.  He is wearing good shoes that are comfortable.  He has a carbon plate and over-the-counter arch support with a metatarsal pad on here.  Patient has diabetes with peripheral neuropathy.  He has no other new complaints.    8/10/21   patient returns for evaluation of his left foot.  His heel has been bothersome.  He has been wearing a cam walker.  Is tried Voltaren gel in here but it did not help.  Also had forefoot pain but physical therapy has helped this and feeling better.  He also states that the Voltaren gel helps this.  Has history of ankylosing spondylitis.  He has diabetes with peripheral neuropathy.    ROS: See above       Allergies   Allergen Reactions     Amoxicillin-Pot Clavulanate Difficulty breathing     Banana Shortness Of Breath     Pt reports organic Banana is okay.      Nitroglycerin Palpitations     Penicillins Anaphylaxis     Provigil [Modafinil] Shortness Of Breath     headache     Gadolinium Hives and Itching     Patient was premedicated for the contrast allergy. He did still have a reaction a few hours after injection. Hives and itching. Dr. Gomez told tech to inform pt he should only have contrast again in the future when premedicated and at a hospital. Not at an outpatient facility.       Ketoconazole      Topical cream caused swelling and itching     Dye [Contrast Dye] Other (See Comments) and Hives     Moderate flushing, CT contrast     Golimumab      Hives, bradycardia, face swelling     Neurontin [Gabapentin] Hives     Moderate hives     Nortriptyline Hives     Varicella Virus Vaccine Live      Rash     Flagyl [Metronidazole Hcl] Palpitations and Hives     Latex Rash     Metronidazole Palpitations, Other (See Comments) and Rash     dizziness (versus ciprofloxacin taken at same time)       Current Outpatient Medications   Medication Sig Dispense Refill     acetaminophen 500 MG CAPS Take 1,000 mg by mouth 2 times daily  60 capsule      albuterol (PROAIR HFA/PROVENTIL HFA/VENTOLIN HFA) 108 (90 Base) MCG/ACT inhaler Inhale 2 puffs into the lungs every 4 hours as needed for shortness of breath / dyspnea or wheezing 8.5 g 11     aspirin (ASA) 81 MG tablet Take 81 mg by mouth daily        cetirizine (ZYRTEC) 10 MG tablet Take 1 tablet (10 mg) by mouth At Bedtime 30 tablet 11     cholecalciferol (D3-50) 1250 mcg (94678 units) capsule TAKE 1 CAPSULE BY MOUTH EVERY 2 WEEKS 24 capsule 0     clonazePAM (KLONOPIN) 0.5 MG tablet Take 0.5 mg by mouth 2 times daily as needed        cyanocobalamin (CYANOCOBALAMIN) 1000 MCG/ML injection Inject 1 mL (1,000 mcg) into the muscle every 30 days 10 mL 0     DULoxetine (CYMBALTA) 60 MG capsule Take 120 mg by mouth daily        EPINEPHrine (ANY BX GENERIC EQUIV) 0.3 MG/0.3ML injection 2-pack Inject 0.3 mLs (0.3 mg) into the muscle once as needed for anaphylaxis 0.6 mL 3     eszopiclone (LUNESTA) 2 MG tablet Take 2 mg by mouth At Bedtime       etanercept (ENBREL SURECLICK) 50 MG/ML autoinjector Inject 50 mg Subcutaneous once a week . Hold for signs of infection, and seek medical attention. 4 mL 12     famotidine (PEPCID) 20 MG tablet Prior to administration of Humira every 2 weeks (Patient taking differently: Take 20 mg by mouth every 7 days Prior to Enbrel Injections to  prevent skin reaction)       fluticasone (FLONASE) 50 MCG/ACT nasal spray Spray 1 spray into both nostrils daily       fluticasone-salmeterol (ADVAIR) 250-50 MCG/DOSE inhaler Inhale 1 puff into the lungs every 12 hours 14 each 11     glipiZIDE (GLUCOTROL XL) 2.5 MG 24 hr tablet TAKE 1 TABLET BY MOUTH ONCE DAILY  90 tablet 0     lamoTRIgine (LAMICTAL) 100 MG tablet Take 200 mg by mouth daily       levothyroxine (SYNTHROID/LEVOTHROID) 75 MCG tablet TAKE 1 TABLET EVERY MORNING 90 tablet 3     lidocaine (XYLOCAINE) 5 % external ointment Apply topically 4 times daily as needed (pain) 50 g 2     melatonin 3 MG tablet Take 12 mg by mouth nightly as needed        methocarbamol (ROBAXIN) 500 MG tablet Take 1-2 tablets (500-1,000 mg) by mouth 4 times daily as needed for muscle spasms 240 tablet 1     metoclopramide (REGLAN) 5 MG tablet Take 5 mg by mouth 2 times daily       metoprolol succinate ER (TOPROL-XL) 200 MG 24 hr tablet TAKE 1 TABLET BY MOUTH TWICE DAILY  180 tablet 0     montelukast (SINGULAIR) 10 MG tablet Take 1 tablet (10 mg) by mouth every evening 90 tablet 3     nabumetone (RELAFEN) 500 MG tablet Take 1-2 tablets (500-1,000 mg) by mouth 2 times daily as needed for moderate pain (with food) 120 tablet 2     naloxone (NARCAN) 4 MG/0.1ML nasal spray Spray 1 spray (4 mg) into one nostril alternating nostrils as needed for opioid reversal every 2-3 minutes until assistance arrives 0.2 mL 0     olopatadine (PATADAY) 0.2 % ophthalmic solution Place 1 drop into both eyes daily 2.5 mL 3     omega-3 acid ethyl esters (LOVAZA) 1 g capsule TAKE 2 CAPSULES BY MOUTH TWICE DAILY  360 capsule 1     omeprazole 20 MG tablet Take 20 mg by mouth daily        ondansetron (ZOFRAN-ODT) 8 MG ODT tab Take 8 mg by mouth every 8 hours as needed for nausea       order for DME Equipment being ordered: Assure Compression stockings (ANNETTA) Large, closed toe, thigh-high 30-40 compression 2 Units 3     order for DME Equipment being ordered:  "lumbosacral belt/brace 1 Units 0     order for [facility] Respironics REMSTAR 60 Series Auto CPAP 9-13 cm H2O, Wisp nasal mask w/a large cushion and a chinstrap       oxyCODONE (ROXICODONE) 5 MG tablet Take 1 tablet (5 mg) by mouth every 6 hours as needed for pain (maximum 6 tablet(s) per day) . Acute on chronic pain 35 tablet 0     polyethylene glycol (MIRALAX) 17 g packet Take 1 packet by mouth daily       pregabalin (LYRICA) 150 MG capsule Take 1 capsule (150 mg) by mouth 3 times daily 90 capsule 5     QUEtiapine (SEROQUEL) 100 MG tablet Take 100 mg by mouth At Bedtime       ramipril (ALTACE) 10 MG capsule Take 1 capsule (10 mg) by mouth daily 90 capsule 1     rizatriptan (MAXALT-MLT) 5 MG ODT DISSOLVE 1 TABLET BY MOUTH AT ONSET OF HEADACHE 30 tablet 0     rosuvastatin (CRESTOR) 40 MG tablet Take 1 tablet (40 mg) by mouth daily 90 tablet 2     syringe, disposable, (BD TUBERCULIN SYRINGE) 1 ML MISC Equipment being ordered: 1 ml tuberculin syringes to be used for Vitamin B12 injections. 12 each 11     syringe/needle, disp, (BD INTEGRA SYRINGE) 25G X 1\" 3 ML MISC USE FOR VITAMIN B12 INJECTIONS 12 each 0     triamcinolone (KENALOG) 0.1 % external cream Apply topically 2 times daily as needed for irritation 30 g 2     vitamin B complex with vitamin C (STRESS TAB) tablet Take 1 tablet by mouth daily       ZINC SULFATE-VITAMIN C MT Take 1 tablet by mouth daily         Patient Active Problem List   Diagnosis     Major depressive disorder, recurrent episode (H)     Intermittent asthma     Hyperlipidemia LDL goal <100     Chronic nonallergic rhinitis     Diverticulosis     GERD (gastroesophageal reflux disease)     Anxiety     LLOYD (obstructive sleep apnea)- mild (AHI 11)     Intracranial arachnoid cyst     Facet arthritis of cervical region     Acquired hypothyroidism     Bipolar 2 disorder (H)     Chronic midline low back pain without sciatica     Irritable bowel syndrome with diarrhea     B12 deficiency     Essential " hypertension with goal blood pressure less than 140/90     Chronic, continuous use of opioids     Ankylosing spondylitis of sacral region (H)     Morbid obesity due to hypertriglyceridemia (H)     Fatty infiltration of liver     DDD (degenerative disc disease), lumbar     Type 2 diabetes mellitus with complication, without long-term current use of insulin (H)     Peripheral polyneuropathy     History of pulmonary embolism     Ingrown toenail     Hypertriglyceridemia     Gastroparesis     Orthostatic dizziness     POTS (postural orthostatic tachycardia syndrome)     PHN (postherpetic neuralgia)     Dysautonomia (H)     Major depressive disorder, recurrent episode, severe (H)     Chronic pain syndrome     MDD (major depressive disorder), recurrent severe, without psychosis (H)       Past Medical History:   Diagnosis Date     Acne      Acquired hypothyroidism      Allergic state      Ankylosing spondylitis lumbar region (H)      Ankylosing spondylitis of sacral region (H)      Anxiety      Bipolar 2 disorder (H)      Chest pain     Chest pain, regulated w/BP meds. Clear arteries.     Chronic pain      DDD (degenerative disc disease), lumbar      Depressive disorder      Diabetes (H)      Diverticulosis      Facet arthritis of cervical region      Gastroesophageal reflux disease      Hypertension      IBS (irritable bowel syndrome)      Intracranial arachnoid cyst      Polyneuropathy      Pulmonary embolism (H)      Skin exam, screening for cancer 12/3/2013     Sleep apnea      Uncomplicated asthma        Past Surgical History:   Procedure Laterality Date     BACK SURGERY  10/07    lumbar discectomy L5-S1     COLONOSCOPY      Note: colonoscopy scheduled with [facility] on Friday, 9/4/15     COSMETIC SURGERY  2012    Nose Exterior - functional     GI SURGERY  August 2013    Sigmoidectomy     HERNIA REPAIR, UMBILICAL  8/23/11    Dr. Evan whiting     INCISION AND DRAINAGE, ABSCESS, COMPLEX  8/23/11    Dr. Evan jeff  "[facility]     LAPAROSCOPIC ASSISTED COLECTOMY LEFT (DESCENDING)  8/15/2013    Procedure: LAPAROSCOPIC ASSISTED COLECTOMY LEFT (DESCENDING);  Laparoscopic Hand Assisted Sigmoid Resection, Mobilization of Splenic Fissure, coloproctoscopy, *Latex Free Room* Anesthesia General with Pain block  ;  Surgeon: Aurora Justice MD;  Location: [facility] OR     NERVE SURGERY  8/18/11    RF ablation @ L3-S1 @ [facility]     RECONSTRUCT NOSE AND SEPTUM (FUNCTIONAL)  10/14/2011    Procedure:RECONSTRUCT NOSE AND SEPTUM (FUNCTIONAL); Functional Septorhinoplasty, Turbinate Reduction, ; Surgeon:CEDRIC CUEVAS; Location:[facility] OR     SINUS SURGERY  10/1/01    ethmoidectomy chronic sinusitis       Family History   Problem Relation Age of Onset     Musculoskeletal Disorder Mother         back     Anxiety Disorder Mother      Colon Polyps Mother      Ulcerative Colitis Mother         and ischemic small intestine, surgery     Hypertension Mother      Breast Cancer Mother      Osteoporosis Mother      Diabetes Mother         Type 2, Diagnosed in 2014     Depression Mother         Takes Cymbalta to help with chronic pain + depx     Thyroid Disease Mother         Hypothyroidism     Obesity Mother         Under much better control latter half of 2015     Musculoskeletal Disorder Father         back     Substance Abuse Father      Hypertension Father      Hyperlipidemia Father      Depression Father         Off meds for many years. Seems \"ok\"     Heart Disease Maternal Grandmother      Heart Disease Maternal Grandfather      Psychotic Disorder Paternal Grandfather      Suicide Paternal Grandfather      Depression Paternal Grandfather         Pediatrician. Committed suicide by pistol in 1990.     Musculoskeletal Disorder Brother         back     Depression Brother         Expressed as anger and moodiness     Substance Abuse Brother      Substance Abuse Sister      Depression Sister         Mental Health Therapist, yet no anti-depressants?     Anxiety Disorder " Sister         Mental Health Therapist, yet no anti-anxiety meds?     Other Cancer Other         Bladder Cancer - Fatal     Substance Abuse Brother      Colon Cancer No family hx of      Crohn's Disease No family hx of      Anesthesia Reaction No family hx of      Cancer No family hx of         No family history of skin cancer       Social History     Tobacco Use     Smoking status: Never Smoker     Smokeless tobacco: Never Used   Substance Use Topics     Alcohol use: Not Currently     Alcohol/week: 0.0 standard drinks     Comment: occ 1/week         Objective:    Vitals: /80   Pulse 76   Wt 147.4 kg (325 lb)   BMI 37.56 kg/m    BMI: Body mass index is 37.56 kg/m .  Height: Data Unavailable    Constitutional/ general:  Pt is in no apparent distress, appears well-nourished.  Cooperative with history and physical exam.     Psych:  The patient answered questions appropriately.  Normal affect.  Seems to have reasonable expectations, in terms of treatment.     Eyes:  Visual scanning/ tracking without deficit.     Ears:  Response to auditory stimuli is normal.  No hearing aid devices.  Auricles in proper alignment.     Lymphatic:  Popliteal lymph nodes not enlarged.     Lungs:  Non labored breathing, non labored speech. No cough.  No audible wheezing. Even, quiet breathing.       Vascular:  Pedal pulses are palpable bilaterally for both the DP and PT arteries.  CFT < 3 sec.  No edema.  Pedal hair growth noted.     Neuro:  Alert and oriented x 3. Coordinated gait.  Light touch sensation is intact to the L4, L5, S1 distributions. No obvious deficits.  No evidence of neurological-based weakness, spasticity, or contracture in the lower extremities.     Derm: Normal texture and turgor.  No erythema, ecchymosis, or cyanosis.  No open lesions.     Musculoskeletal:    Lower extremity muscle strength is normal.  Patient is ambulatory without an assistive device or brace.  No gross deformities.   Cavus arch with  weightbearing.   ROM is within normal limits.  Negative tinel's sign.  No side to side compression pain of the calcaneus.  Pain upon palpation to the left plantarmedial  of the calcaneus and slightly medial band of plantar fascia and arch.  The medial band of plantar fascia and both arches are prominent.  No erythema, edema, ecchymosis, or subcutaneous masses noted.  No pain on palpation or stressing any tendons.  Negative Tinel's sign.  No pain with palpation of second met heads today.    Patient wearing nice wide shoe with carbon plates and over-the-counter orthotics.    Assessment:  Plantar Fasciitis right and left foot                           Cavus foot                           History of subsecond capsulitis    Plan:   Discussed treatment options.  He would like to try injection and tape.  He denies he is allergic to tape.  Risks complications, and efficacy of cortisone injection discussed.  Patient understands there is a risk of plantar fascial rupture.  After written consent, sterile prep, injected heel with 1 cc 1% lidocaine plain and 1 cc kenalog 10 mg.  LowDye strap applied to foot.  Discussed if not completely resolved could do another injection in 4 to 6 weeks.  Continue good support for his foot at all time and icing and stretching.  Return to clinic prn.      RTC as needed.    Peng Perez DPM DPM, FACFAS        Again, thank you for allowing me to participate in the care of your patient.        Sincerely,        Peng Perez DPM

## 2021-08-10 NOTE — TELEPHONE ENCOUNTER
PA Initiation    Medication: lidocaine (XYLOCAINE) 5 % external ointment   Insurance Company: [employer] - Phone [phone] Fax [phone]  Pharmacy Filling the Rx: [id number] - [place], [place] - [address]  Filling Pharmacy Phone: [phone]  Filling Pharmacy Fax: [phone]  Start Date: 8/10/2021

## 2021-08-11 ENCOUNTER — HOSPITAL ENCOUNTER (OUTPATIENT)
Dept: BEHAVIORAL HEALTH | Facility: CLINIC | Age: 46
End: 2021-08-11
Attending: PSYCHIATRY & NEUROLOGY
Payer: MEDICARE

## 2021-08-11 PROCEDURE — 90853 GROUP PSYCHOTHERAPY: CPT | Mod: PO | Performed by: SOCIAL WORKER

## 2021-08-11 NOTE — GROUP NOTE
Process Group Note    PATIENT'S NAME: Joel Pineda  MRN:   [id number]  :   1975  ACCT. NUMBER: [id number]  DATE OF SERVICE: 21  START TIME:  1:00 PM  END TIME:  2:50 PM  FACILITATOR: Magali Wayne LICSW  TOPIC:  Process Group    Diagnoses:   296.33 (F33.2) Major Depressive Disorder, Recurrent Episode, Severe.  4. Other Diagnoses that is relevant to services:   300.02 (F41.1) Generalized Anxiety Disorder.      [facility] Adult Mental Health Day Treatment  TRACK: clinic 1+    NUMBER OF PARTICIPANTS: 6 (merged with clinic 10                                    Service Modality:  Video Visit     Telemedicine Visit: The patient's condition can be safely assessed and treated via synchronous audio and visual telemedicine encounter.      Reason for Telemedicine Visit: Services only offered telehealth and covid19    Originating Site (Patient Location): Patient's home    Distant Site (Provider Location): Provider Remote Setting- Home Office    Consent:  The patient/guardian has verbally consented to: the potential risks and benefits of telemedicine (video visit) versus in person care; bill my insurance or make self-payment for services provided; and responsibility for payment of non-covered services.     Patient would like the video invitation sent by:  My Chart    Mode of Communication:  Video Conference via Medical Zoom    As the provider I attest to compliance with applicable laws and regulations related to telemedicine.               Data:    Session content: At the start of this group, patients were invited to check in by identifying themselves, describing their current emotional status, and identifying issues to address in this group.   Area(s) of treatment focus addressed in this session included Symptom Management and Personal Safety.  Tito states that he is feeling better than yesterday and shared that he had procedure on foot.  Today he is working on pacing self.  Process of starting new  medication.  Took time in group to ask for group's feedback in building relationship with father.    Therapeutic Interventions/Treatment Strategies:  Psychotherapist offered support, feedback and validation and reinforced use of skills. Treatment modalities used include Dialectical Behavioral Therapy. Interventions include Relationship Skills: Assisted patients in implementing more effective communication skills in their relationships and Discussed strategies to promote healthier understanding of interpersonal relationships.    Assessment:    Patient response:   Patient responded to session by accepting feedback, giving feedback, listening, focusing on goals, being attentive, accepting support, appearing alert, demonstrating behavior change and verbalizing understanding    Possible barriers to participation / learning include: and no barriers identified    Health Issues:   None reported       Substance Use Review:   Substance Use: No active concerns identified.    Mental Status/Behavioral Observations  Appearance:   Appropriate   Eye Contact:   Good   Psychomotor Behavior: Normal   Attitude:   Cooperative   Orientation:   All  Speech   Rate / Production: Normal    Volume:  Normal   Mood:    Depressed  Normal  Affect:    Appropriate   Thought Content:   Clear  Thought Form:  Coherent  Goal Directed  Logical     Insight:    Good     Plan:     Safety Plan: No current safety concerns identified.  Recommended that patient call 911 or go to the local ED should there be a change in any of these risk factors.     Barriers to treatment: None identified    Patient Contracts (see media tab):  None    Substance Use: Not addressed in session     Continue or Discharge: Patient will continue in Adult Day Treatment (ADT)  as planned. Patient is likely to benefit from learning and using skills as they work toward the goals identified in their treatment plan.      SCOTT Joseph  August 11, 2021

## 2021-08-11 NOTE — PROGRESS NOTES
Subjective:    2/11/21   Patient is seen today as a new pt self referral with a several month(s) hx of bilateral heel pain.  Points to the plantarmedial calcaneal tubercle of calcaneus and also gets pain on the medial band of plantar fascia in his arch.  Aggravated by activity and relieved by rest.  Has history of bilateral subsecond capsulitis.  He has been going to physical therapy and they are doing ultrasound on this and he states it has helped.  He is wearing good shoes that are comfortable.  He has a carbon plate and over-the-counter arch support with a metatarsal pad on here.  Patient has diabetes with peripheral neuropathy.  He has no other new complaints.    8/10/21   patient returns for evaluation of his left foot.  His heel has been bothersome.  He has been wearing a cam walker.  Is tried Voltaren gel in here but it did not help.  Also had forefoot pain but physical therapy has helped this and feeling better.  He also states that the Voltaren gel helps this.  Has history of ankylosing spondylitis.  He has diabetes with peripheral neuropathy.    ROS: See above       Allergies   Allergen Reactions     Amoxicillin-Pot Clavulanate Difficulty breathing     Banana Shortness Of Breath     Pt reports organic Banana is okay.      Nitroglycerin Palpitations     Penicillins Anaphylaxis     Provigil [Modafinil] Shortness Of Breath     headache     Gadolinium Hives and Itching     Patient was premedicated for the contrast allergy. He did still have a reaction a few hours after injection. Hives and itching. Dr. Gomez told tech to inform pt he should only have contrast again in the future when premedicated and at a hospital. Not at an outpatient facility.      Ketoconazole      Topical cream caused swelling and itching     Dye [Contrast Dye] Other (See Comments) and Hives     Moderate flushing, CT contrast     Golimumab      Hives, bradycardia, face swelling     Neurontin [Gabapentin] Hives     Moderate hives      Nortriptyline Hives     Varicella Virus Vaccine Live      Rash     Flagyl [Metronidazole Hcl] Palpitations and Hives     Latex Rash     Metronidazole Palpitations, Other (See Comments) and Rash     dizziness (versus ciprofloxacin taken at same time)       Current Outpatient Medications   Medication Sig Dispense Refill     acetaminophen 500 MG CAPS Take 1,000 mg by mouth 2 times daily  60 capsule      albuterol (PROAIR HFA/PROVENTIL HFA/VENTOLIN HFA) 108 (90 Base) MCG/ACT inhaler Inhale 2 puffs into the lungs every 4 hours as needed for shortness of breath / dyspnea or wheezing 8.5 g 11     aspirin (ASA) 81 MG tablet Take 81 mg by mouth daily        cetirizine (ZYRTEC) 10 MG tablet Take 1 tablet (10 mg) by mouth At Bedtime 30 tablet 11     cholecalciferol (D3-50) 1250 mcg (45033 units) capsule TAKE 1 CAPSULE BY MOUTH EVERY 2 WEEKS 24 capsule 0     clonazePAM (KLONOPIN) 0.5 MG tablet Take 0.5 mg by mouth 2 times daily as needed        cyanocobalamin (CYANOCOBALAMIN) 1000 MCG/ML injection Inject 1 mL (1,000 mcg) into the muscle every 30 days 10 mL 0     DULoxetine (CYMBALTA) 60 MG capsule Take 120 mg by mouth daily        EPINEPHrine (ANY BX GENERIC EQUIV) 0.3 MG/0.3ML injection 2-pack Inject 0.3 mLs (0.3 mg) into the muscle once as needed for anaphylaxis 0.6 mL 3     eszopiclone (LUNESTA) 2 MG tablet Take 2 mg by mouth At Bedtime       etanercept (ENBREL SURECLICK) 50 MG/ML autoinjector Inject 50 mg Subcutaneous once a week . Hold for signs of infection, and seek medical attention. 4 mL 12     famotidine (PEPCID) 20 MG tablet Prior to administration of Humira every 2 weeks (Patient taking differently: Take 20 mg by mouth every 7 days Prior to Enbrel Injections to prevent skin reaction)       fluticasone (FLONASE) 50 MCG/ACT nasal spray Spray 1 spray into both nostrils daily       fluticasone-salmeterol (ADVAIR) 250-50 MCG/DOSE inhaler Inhale 1 puff into the lungs every 12 hours 14 each 11     glipiZIDE (GLUCOTROL XL)  2.5 MG 24 hr tablet TAKE 1 TABLET BY MOUTH ONCE DAILY  90 tablet 0     lamoTRIgine (LAMICTAL) 100 MG tablet Take 200 mg by mouth daily       levothyroxine (SYNTHROID/LEVOTHROID) 75 MCG tablet TAKE 1 TABLET EVERY MORNING 90 tablet 3     lidocaine (XYLOCAINE) 5 % external ointment Apply topically 4 times daily as needed (pain) 50 g 2     melatonin 3 MG tablet Take 12 mg by mouth nightly as needed        methocarbamol (ROBAXIN) 500 MG tablet Take 1-2 tablets (500-1,000 mg) by mouth 4 times daily as needed for muscle spasms 240 tablet 1     metoclopramide (REGLAN) 5 MG tablet Take 5 mg by mouth 2 times daily       metoprolol succinate ER (TOPROL-XL) 200 MG 24 hr tablet TAKE 1 TABLET BY MOUTH TWICE DAILY  180 tablet 0     montelukast (SINGULAIR) 10 MG tablet Take 1 tablet (10 mg) by mouth every evening 90 tablet 3     nabumetone (RELAFEN) 500 MG tablet Take 1-2 tablets (500-1,000 mg) by mouth 2 times daily as needed for moderate pain (with food) 120 tablet 2     naloxone (NARCAN) 4 MG/0.1ML nasal spray Spray 1 spray (4 mg) into one nostril alternating nostrils as needed for opioid reversal every 2-3 minutes until assistance arrives 0.2 mL 0     olopatadine (PATADAY) 0.2 % ophthalmic solution Place 1 drop into both eyes daily 2.5 mL 3     omega-3 acid ethyl esters (LOVAZA) 1 g capsule TAKE 2 CAPSULES BY MOUTH TWICE DAILY  360 capsule 1     omeprazole 20 MG tablet Take 20 mg by mouth daily        ondansetron (ZOFRAN-ODT) 8 MG ODT tab Take 8 mg by mouth every 8 hours as needed for nausea       order for DME Equipment being ordered: Assure Compression stockings (ANNETTA) Large, closed toe, thigh-high 30-40 compression 2 Units 3     order for DME Equipment being ordered: lumbosacral belt/brace 1 Units 0     order for DME Respironics REMSTAR 60 Series Auto CPAP 9-13 cm H2O, Wisp nasal mask w/a large cushion and a chinstrap       oxyCODONE (ROXICODONE) 5 MG tablet Take 1 tablet (5 mg) by mouth every 6 hours as needed for pain  "(maximum 6 tablet(s) per day) . Acute on chronic pain 35 tablet 0     polyethylene glycol (MIRALAX) 17 g packet Take 1 packet by mouth daily       pregabalin (LYRICA) 150 MG capsule Take 1 capsule (150 mg) by mouth 3 times daily 90 capsule 5     QUEtiapine (SEROQUEL) 100 MG tablet Take 100 mg by mouth At Bedtime       ramipril (ALTACE) 10 MG capsule Take 1 capsule (10 mg) by mouth daily 90 capsule 1     rizatriptan (MAXALT-MLT) 5 MG ODT DISSOLVE 1 TABLET BY MOUTH AT ONSET OF HEADACHE 30 tablet 0     rosuvastatin (CRESTOR) 40 MG tablet Take 1 tablet (40 mg) by mouth daily 90 tablet 2     syringe, disposable, (BD TUBERCULIN SYRINGE) 1 ML MISC Equipment being ordered: 1 ml tuberculin syringes to be used for Vitamin B12 injections. 12 each 11     syringe/needle, disp, (BD INTEGRA SYRINGE) 25G X 1\" 3 ML MISC USE FOR VITAMIN B12 INJECTIONS 12 each 0     triamcinolone (KENALOG) 0.1 % external cream Apply topically 2 times daily as needed for irritation 30 g 2     vitamin B complex with vitamin C (STRESS TAB) tablet Take 1 tablet by mouth daily       ZINC SULFATE-VITAMIN C MT Take 1 tablet by mouth daily         Patient Active Problem List   Diagnosis     Major depressive disorder, recurrent episode (H)     Intermittent asthma     Hyperlipidemia LDL goal <100     Chronic nonallergic rhinitis     Diverticulosis     GERD (gastroesophageal reflux disease)     Anxiety     LLOYD (obstructive sleep apnea)- mild (AHI 11)     Intracranial arachnoid cyst     Facet arthritis of cervical region     Acquired hypothyroidism     Bipolar 2 disorder (H)     Chronic midline low back pain without sciatica     Irritable bowel syndrome with diarrhea     B12 deficiency     Essential hypertension with goal blood pressure less than 140/90     Chronic, continuous use of opioids     Ankylosing spondylitis of sacral region (H)     Morbid obesity due to hypertriglyceridemia (H)     Fatty infiltration of liver     DDD (degenerative disc disease), lumbar "     Type 2 diabetes mellitus with complication, without long-term current use of insulin (H)     Peripheral polyneuropathy     History of pulmonary embolism     Ingrown toenail     Hypertriglyceridemia     Gastroparesis     Orthostatic dizziness     POTS (postural orthostatic tachycardia syndrome)     PHN (postherpetic neuralgia)     Dysautonomia (H)     Major depressive disorder, recurrent episode, severe (H)     Chronic pain syndrome     MDD (major depressive disorder), recurrent severe, without psychosis (H)       Past Medical History:   Diagnosis Date     Acne      Acquired hypothyroidism      Allergic state      Ankylosing spondylitis lumbar region (H)      Ankylosing spondylitis of sacral region (H)      Anxiety      Bipolar 2 disorder (H)      Chest pain     Chest pain, regulated w/BP meds. Clear arteries.     Chronic pain      DDD (degenerative disc disease), lumbar      Depressive disorder      Diabetes (H)      Diverticulosis      Facet arthritis of cervical region      Gastroesophageal reflux disease      Hypertension      IBS (irritable bowel syndrome)      Intracranial arachnoid cyst      Polyneuropathy      Pulmonary embolism (H)      Skin exam, screening for cancer 12/3/2013     Sleep apnea      Uncomplicated asthma        Past Surgical History:   Procedure Laterality Date     BACK SURGERY  10/07    lumbar discectomy L5-S1     COLONOSCOPY      Note: colonoscopy scheduled with [facility] on Friday, 9/4/15     COSMETIC SURGERY  2012    Nose Exterior - functional     GI SURGERY  August 2013    Sigmoidectomy     HERNIA REPAIR, UMBILICAL  8/23/11    Dr. Evan whiting     INCISION AND DRAINAGE, ABSCESS, COMPLEX  8/23/11    umbilical, Dr. Evan Beavers     LAPAROSCOPIC ASSISTED COLECTOMY LEFT (DESCENDING)  8/15/2013    Procedure: LAPAROSCOPIC ASSISTED COLECTOMY LEFT (DESCENDING);  Laparoscopic Hand Assisted Sigmoid Resection, Mobilization of Splenic Fissure, coloproctoscopy, *Latex Free Room* Anesthesia  "General with Pain block  ;  Surgeon: Aurora Justice MD;  Location: [facility] OR     NERVE SURGERY  8/18/11    RF ablation @ L3-S1 @ [facility]     RECONSTRUCT NOSE AND SEPTUM (FUNCTIONAL)  10/14/2011    Procedure:RECONSTRUCT NOSE AND SEPTUM (FUNCTIONAL); Functional Septorhinoplasty, Turbinate Reduction, ; Surgeon:CEDRIC CUEVAS; Location:[facility] OR     SINUS SURGERY  10/1/01    ethmoidectomy chronic sinusitis       Family History   Problem Relation Age of Onset     Musculoskeletal Disorder Mother         back     Anxiety Disorder Mother      Colon Polyps Mother      Ulcerative Colitis Mother         and ischemic small intestine, surgery     Hypertension Mother      Breast Cancer Mother      Osteoporosis Mother      Diabetes Mother         Type 2, Diagnosed in 2014     Depression Mother         Takes Cymbalta to help with chronic pain + depx     Thyroid Disease Mother         Hypothyroidism     Obesity Mother         Under much better control latter half of 2015     Musculoskeletal Disorder Father         back     Substance Abuse Father      Hypertension Father      Hyperlipidemia Father      Depression Father         Off meds for many years. Seems \"ok\"     Heart Disease Maternal Grandmother      Heart Disease Maternal Grandfather      Psychotic Disorder Paternal Grandfather      Suicide Paternal Grandfather      Depression Paternal Grandfather         Pediatrician. Committed suicide by pistol in 1990.     Musculoskeletal Disorder Brother         back     Depression Brother         Expressed as anger and moodiness     Substance Abuse Brother      Substance Abuse Sister      Depression Sister         Mental Health Therapist, yet no anti-depressants?     Anxiety Disorder Sister         Mental Health Therapist, yet no anti-anxiety meds?     Other Cancer Other         Bladder Cancer - Fatal     Substance Abuse Brother      Colon Cancer No family hx of      Crohn's Disease No family hx of      Anesthesia Reaction No family hx of      " Cancer No family hx of         No family history of skin cancer       Social History     Tobacco Use     Smoking status: Never Smoker     Smokeless tobacco: Never Used   Substance Use Topics     Alcohol use: Not Currently     Alcohol/week: 0.0 standard drinks     Comment: occ 1/week         Objective:    Vitals: /80   Pulse 76   Wt 147.4 kg (325 lb)   BMI 37.56 kg/m    BMI: Body mass index is 37.56 kg/m .  Height: Data Unavailable    Constitutional/ general:  Pt is in no apparent distress, appears well-nourished.  Cooperative with history and physical exam.     Psych:  The patient answered questions appropriately.  Normal affect.  Seems to have reasonable expectations, in terms of treatment.     Eyes:  Visual scanning/ tracking without deficit.     Ears:  Response to auditory stimuli is normal.  No hearing aid devices.  Auricles in proper alignment.     Lymphatic:  Popliteal lymph nodes not enlarged.     Lungs:  Non labored breathing, non labored speech. No cough.  No audible wheezing. Even, quiet breathing.       Vascular:  Pedal pulses are palpable bilaterally for both the DP and PT arteries.  CFT < 3 sec.  No edema.  Pedal hair growth noted.     Neuro:  Alert and oriented x 3. Coordinated gait.  Light touch sensation is intact to the L4, L5, S1 distributions. No obvious deficits.  No evidence of neurological-based weakness, spasticity, or contracture in the lower extremities.     Derm: Normal texture and turgor.  No erythema, ecchymosis, or cyanosis.  No open lesions.     Musculoskeletal:    Lower extremity muscle strength is normal.  Patient is ambulatory without an assistive device or brace.  No gross deformities.   Cavus arch with weightbearing.   ROM is within normal limits.  Negative tinel's sign.  No side to side compression pain of the calcaneus.  Pain upon palpation to the left plantarmedial  of the calcaneus and slightly medial band of plantar fascia and arch.  The medial band of plantar fascia  and both arches are prominent.  No erythema, edema, ecchymosis, or subcutaneous masses noted.  No pain on palpation or stressing any tendons.  Negative Tinel's sign.  No pain with palpation of second met heads today.    Patient wearing nice wide shoe with carbon plates and over-the-counter orthotics.    Assessment:  Plantar Fasciitis right and left foot                           Cavus foot                           History of subsecond capsulitis    Plan:   Discussed treatment options.  He would like to try injection and tape.  He denies he is allergic to tape.  Risks complications, and efficacy of cortisone injection discussed.  Patient understands there is a risk of plantar fascial rupture.  After written consent, sterile prep, injected heel with 1 cc 1% lidocaine plain and 1 cc kenalog 10 mg.  LowDye strap applied to foot.  Discussed if not completely resolved could do another injection in 4 to 6 weeks.  Continue good support for his foot at all time and icing and stretching.  Return to clinic prn.      RTC as needed.    Peng Perez, LIAN DPM, FACFAS

## 2021-08-12 ENCOUNTER — MYC MEDICAL ADVICE (OUTPATIENT)
Dept: PALLIATIVE MEDICINE | Facility: CLINIC | Age: 46
End: 2021-08-12

## 2021-08-15 NOTE — PROGRESS NOTES
Colon and Rectal Surgery Clinic Note    RE: Joel Pineda.  : 1975.  MARY: 2021.    Reason for visit: anal pain     HPI (as seen by me on 10/14/20): 44M with history of diverticular disease s/p sigmoidectomy with Dr. Justice in . He has most recently been seen for recurrent perineal abscess. He has had abscesses in past, most recently drained in local ED in . He was subsequently seen by Dr. Zhu. This healed and he came in for evaluation of underlying cyst or fistula as a cause for recurrent infection. No lesions were seen on exam. It was recommended he continue treatment for pruritis ani, and follow up as needed. He now presents with complaints of sharp pain, intermittent. Had some blood in stool a few months, nothing recently. Occasionally blood on TP. Currently on antibiotic for toe infection. Was having diarrhea due to metformin, improved with glipizide. Also doing baths every other day, calmoseptine helps as well. Unclear if pain with BM. Pain has been ongoing. Takes benefiber which helps. On Etanercept for ankylosing spondylitis.     Last colonoscopy was 9/15/20. Cecal and ascending colon polyps were found, path consistent with TA. EGD was normal.    -Last visit, plan was to initiate diltiazem, fiber and water to manage a posterior midline anal fissure.    Interval Hx: Remains with pain, intermittent bleeding on and off. The pain is not primarily related to defecation now, but occurs randomly. He does have to strain with BMs. No bleeding. BMs are soft. Using diltiazem, lidocaine creams.    Medical history:  Past Medical History:   Diagnosis Date     Acne      Acquired hypothyroidism      Allergic state      Ankylosing spondylitis lumbar region (H)      Ankylosing spondylitis of sacral region (H)      Anxiety      Bipolar 2 disorder (H)      Chest pain     Chest pain, regulated w/BP meds. Clear arteries.     Chronic pain      DDD (degenerative disc disease), lumbar      Depressive  disorder      Diabetes (H)      Diverticulosis      Facet arthritis of cervical region      Gastroesophageal reflux disease      Hypertension      IBS (irritable bowel syndrome)      Intracranial arachnoid cyst      Polyneuropathy      Pulmonary embolism (H)      Skin exam, screening for cancer 12/3/2013     Sleep apnea      Uncomplicated asthma        Surgical history:  Past Surgical History:   Procedure Laterality Date     BACK SURGERY  10/07    lumbar discectomy L5-S1     COLONOSCOPY      Note: colonoscopy scheduled with [facility] on Friday, 9/4/15     COSMETIC SURGERY  2012    Nose Exterior - functional     GI SURGERY  August 2013    Sigmoidectomy     HERNIA REPAIR, UMBILICAL  8/23/11    small, Dr. Evan Beavers     INCISION AND DRAINAGE, ABSCESS, COMPLEX  8/23/11    umbilical, Dr. Evan Beavers     LAPAROSCOPIC ASSISTED COLECTOMY LEFT (DESCENDING)  8/15/2013    Procedure: LAPAROSCOPIC ASSISTED COLECTOMY LEFT (DESCENDING);  Laparoscopic Hand Assisted Sigmoid Resection, Mobilization of Splenic Fissure, coloproctoscopy, *Latex Free Room* Anesthesia General with Pain block  ;  Surgeon: Aurora Justice MD;  Location: [facility] OR     NERVE SURGERY  8/18/11    RF ablation @ L3-S1 @ [facility]     RECONSTRUCT NOSE AND SEPTUM (FUNCTIONAL)  10/14/2011    Procedure:RECONSTRUCT NOSE AND SEPTUM (FUNCTIONAL); Functional Septorhinoplasty, Turbinate Reduction, ; Surgeon:CEDRIC CUEVAS; Location:[facility] OR     SINUS SURGERY  10/1/01    ethmoidectomy chronic sinusitis       Family history:  Family History   Problem Relation Age of Onset     Musculoskeletal Disorder Mother         back     Anxiety Disorder Mother      Colon Polyps Mother      Ulcerative Colitis Mother         and ischemic small intestine, surgery     Hypertension Mother      Breast Cancer Mother      Osteoporosis Mother      Diabetes Mother         Type 2, Diagnosed in 2014     Depression Mother         Takes Cymbalta to help with chronic pain + depx     Thyroid Disease Mother       "   Hypothyroidism     Obesity Mother         Under much better control latter half of 2015     Musculoskeletal Disorder Father         back     Substance Abuse Father      Hypertension Father      Hyperlipidemia Father      Depression Father         Off meds for many years. Seems \"ok\"     Heart Disease Maternal Grandmother      Heart Disease Maternal Grandfather      Psychotic Disorder Paternal Grandfather      Suicide Paternal Grandfather      Depression Paternal Grandfather         Pediatrician. Committed suicide by pistol in 1990.     Musculoskeletal Disorder Brother         back     Depression Brother         Expressed as anger and moodiness     Substance Abuse Brother      Substance Abuse Sister      Depression Sister         Mental Health Therapist, yet no anti-depressants?     Anxiety Disorder Sister         Mental Health Therapist, yet no anti-anxiety meds?     Other Cancer Other         Bladder Cancer - Fatal     Substance Abuse Brother      Colon Cancer No family hx of      Crohn's Disease No family hx of      Anesthesia Reaction No family hx of      Cancer No family hx of         No family history of skin cancer     No Hx of IBD or GI malignancy    Medications:  Current Outpatient Medications   Medication Sig Dispense Refill     acetaminophen 500 MG CAPS Take 1,000 mg by mouth 2 times daily  60 capsule      albuterol (PROAIR HFA/PROVENTIL HFA/VENTOLIN HFA) 108 (90 Base) MCG/ACT inhaler Inhale 2 puffs into the lungs every 4 hours as needed for shortness of breath / dyspnea or wheezing 8.5 g 11     aspirin (ASA) 81 MG tablet Take 81 mg by mouth daily        cetirizine (ZYRTEC) 10 MG tablet Take 1 tablet (10 mg) by mouth At Bedtime 30 tablet 11     cholecalciferol (D3-50) 1250 mcg (63268 units) capsule TAKE 1 CAPSULE BY MOUTH EVERY 2 WEEKS 24 capsule 0     clonazePAM (KLONOPIN) 0.5 MG tablet Take 0.5 mg by mouth 2 times daily as needed        cyanocobalamin (CYANOCOBALAMIN) 1000 MCG/ML injection Inject 1 mL " (1,000 mcg) into the muscle every 30 days 10 mL 0     DULoxetine (CYMBALTA) 60 MG capsule Take 120 mg by mouth daily        EPINEPHrine (ANY BX GENERIC EQUIV) 0.3 MG/0.3ML injection 2-pack Inject 0.3 mLs (0.3 mg) into the muscle once as needed for anaphylaxis 0.6 mL 3     eszopiclone (LUNESTA) 2 MG tablet Take 2 mg by mouth At Bedtime       etanercept (ENBREL SURECLICK) 50 MG/ML autoinjector Inject 50 mg Subcutaneous once a week . Hold for signs of infection, and seek medical attention. 4 mL 12     famotidine (PEPCID) 20 MG tablet Prior to administration of Humira every 2 weeks (Patient taking differently: Take 20 mg by mouth every 7 days Prior to Enbrel Injections to prevent skin reaction)       fluticasone (FLONASE) 50 MCG/ACT nasal spray Spray 1 spray into both nostrils daily       fluticasone-salmeterol (ADVAIR) 250-50 MCG/DOSE inhaler Inhale 1 puff into the lungs every 12 hours 14 each 11     glipiZIDE (GLUCOTROL XL) 2.5 MG 24 hr tablet TAKE 1 TABLET BY MOUTH ONCE DAILY  90 tablet 0     lamoTRIgine (LAMICTAL) 100 MG tablet Take 200 mg by mouth daily       levothyroxine (SYNTHROID/LEVOTHROID) 75 MCG tablet TAKE 1 TABLET EVERY MORNING 90 tablet 3     lidocaine (XYLOCAINE) 5 % external ointment Apply topically 4 times daily as needed (pain) 50 g 2     melatonin 3 MG tablet Take 12 mg by mouth nightly as needed        methocarbamol (ROBAXIN) 500 MG tablet Take 1-2 tablets (500-1,000 mg) by mouth 4 times daily as needed for muscle spasms 240 tablet 1     metoclopramide (REGLAN) 5 MG tablet Take 5 mg by mouth 2 times daily       metoprolol succinate ER (TOPROL-XL) 200 MG 24 hr tablet TAKE 1 TABLET BY MOUTH TWICE DAILY  180 tablet 0     montelukast (SINGULAIR) 10 MG tablet Take 1 tablet (10 mg) by mouth every evening 90 tablet 3     nabumetone (RELAFEN) 500 MG tablet Take 1-2 tablets (500-1,000 mg) by mouth 2 times daily as needed for moderate pain (with food) 120 tablet 2     naloxone (NARCAN) 4 MG/0.1ML nasal spray  "Spray 1 spray (4 mg) into one nostril alternating nostrils as needed for opioid reversal every 2-3 minutes until assistance arrives 0.2 mL 0     olopatadine (PATADAY) 0.2 % ophthalmic solution Place 1 drop into both eyes daily 2.5 mL 3     omega-3 acid ethyl esters (LOVAZA) 1 g capsule TAKE 2 CAPSULES BY MOUTH TWICE DAILY  360 capsule 1     omeprazole 20 MG tablet Take 20 mg by mouth daily        ondansetron (ZOFRAN-ODT) 8 MG ODT tab Take 8 mg by mouth every 8 hours as needed for nausea       order for DME Equipment being ordered: Assure Compression stockings (ANNETTA) Large, closed toe, thigh-high 30-40 compression 2 Units 3     order for DME Equipment being ordered: lumbosacral belt/brace 1 Units 0     order for DME Respironics REMSTAR 60 Series Auto CPAP 9-13 cm H2O, Wisp nasal mask w/a large cushion and a chinstrap       oxyCODONE (ROXICODONE) 5 MG tablet Take 1 tablet (5 mg) by mouth every 6 hours as needed for pain (maximum 6 tablet(s) per day) . Acute on chronic pain 35 tablet 0     polyethylene glycol (MIRALAX) 17 g packet Take 1 packet by mouth daily       pregabalin (LYRICA) 150 MG capsule Take 1 capsule (150 mg) by mouth 3 times daily 90 capsule 5     QUEtiapine (SEROQUEL) 100 MG tablet Take 100 mg by mouth At Bedtime       ramipril (ALTACE) 10 MG capsule Take 1 capsule (10 mg) by mouth daily 90 capsule 1     rizatriptan (MAXALT-MLT) 5 MG ODT DISSOLVE 1 TABLET BY MOUTH AT ONSET OF HEADACHE 30 tablet 0     rosuvastatin (CRESTOR) 40 MG tablet Take 1 tablet (40 mg) by mouth daily 90 tablet 2     syringe, disposable, (BD TUBERCULIN SYRINGE) 1 ML MISC Equipment being ordered: 1 ml tuberculin syringes to be used for Vitamin B12 injections. 12 each 11     syringe/needle, disp, (BD INTEGRA SYRINGE) 25G X 1\" 3 ML MISC USE FOR VITAMIN B12 INJECTIONS 12 each 0     triamcinolone (KENALOG) 0.1 % external cream Apply topically 2 times daily as needed for irritation 30 g 2     vitamin B complex with vitamin C (STRESS TAB) " "tablet Take 1 tablet by mouth daily       ZINC SULFATE-VITAMIN C MT Take 1 tablet by mouth daily         Allergies:  Allergies   Allergen Reactions     Amoxicillin-Pot Clavulanate Difficulty breathing     Banana Shortness Of Breath     Pt reports organic Banana is okay.      Nitroglycerin Palpitations     Penicillins Anaphylaxis     Provigil [Modafinil] Shortness Of Breath     headache     Gadolinium Hives and Itching     Patient was premedicated for the contrast allergy. He did still have a reaction a few hours after injection. Hives and itching. Dr. Gomez told tech to inform pt he should only have contrast again in the future when premedicated and at a hospital. Not at an outpatient facility.      Ketoconazole      Topical cream caused swelling and itching     Dye [Contrast Dye] Other (See Comments) and Hives     Moderate flushing, CT contrast     Golimumab      Hives, bradycardia, face swelling     Neurontin [Gabapentin] Hives     Moderate hives     Nortriptyline Hives     Varicella Virus Vaccine Live      Rash     Flagyl [Metronidazole Hcl] Palpitations and Hives     Latex Rash     Metronidazole Palpitations, Other (See Comments) and Rash     dizziness (versus ciprofloxacin taken at same time)       Social history:   Social History     Tobacco Use     Smoking status: Never Smoker     Smokeless tobacco: Never Used   Substance Use Topics     Alcohol use: Not Currently     Alcohol/week: 0.0 standard drinks     Comment: occ 1/week     Marital status: single.    ROS:  A complete review of systems was performed with the patient and all systems negative except as per HPI.    Physical Examination:  Exam was chaperoned by Daisy Calabrese  /73 (BP Location: Left arm, Patient Position: Sitting, Cuff Size: Adult Large)   Pulse 73   Ht 1.981 m (6' 6\")   Wt 145 kg (319 lb 11.2 oz)   SpO2 97%   BMI 36.95 kg/m    General: Well hydrated. No acute distress.  Perianal external examination:  Perianal " skin: Intact with no excoriation or lichenification.  Lesions: No evidence of an external lesion, nodularity, or induration in the perianal region.  Eversion of buttocks: There was not evidence of an anal fissure. Details: A scar was present in the posterior midline indicative of healed fissure.  Skin tags or external hemorrhoids: None.  Digital rectal examination: Was performed.   Sphincter tone: Good.  Palpable lesions: No.  Other: None.  Bimanual examination: was not performed.    Anoscopy: Was performed.   Hemorrhoids: No significant internal hemorrhoids.  Lesions: No    Laboratory values reviewed:  Recent Labs   Lab Test 10/16/20  0824 10/09/20  1550 03/26/15  1421 03/18/15  0912   WBC  --  9.6  --   --    HGB  --  15.2  --   --    PLT  --  264  --   --    CR 1.28* 0.77   < >  --    ALBUMIN  --  4.3   < >  --    BILITOTAL  --  0.4   < >  --    ALKPHOS  --  113   < >  --    ALT  --  53   < >  --    AST  --  53*   < >  --    INR  --   --   --  0.90    < > = values in this interval not displayed.         ASSESSMENT  1. Hx of diverticulitis s/p sigmoid colectomy in 2013.  2. Hx of recurrent perianal abscesses.  3. 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    PLAN  1. Since no fissure was seen, no further need for diltiazem. Continue fiber.  2. I recommend physical therapy at this time.    Risks, benefits, and alternatives of operative treatment were thoroughly discussed with the patient, he understands these well and agrees to proceed.    Time spent: 15 minutes.  >50% spent in discussing, counseling and coordinating care.    Jeremy Jimenez M.D    Division of Colon and Rectal Surgery  [facility]    Referring Provider:  Referred Self, MD  No address on file     Primary Care Provider:  Ksenia Lyles

## 2021-08-16 ENCOUNTER — TELEPHONE (OUTPATIENT)
Dept: BEHAVIORAL HEALTH | Facility: CLINIC | Age: 46
End: 2021-08-16

## 2021-08-17 ENCOUNTER — HOSPITAL ENCOUNTER (EMERGENCY)
Facility: CLINIC | Age: 46
Discharge: HOME | End: 2021-08-17
Attending: EMERGENCY MEDICINE | Admitting: EMERGENCY MEDICINE
Payer: MEDICARE

## 2021-08-17 ENCOUNTER — VIRTUAL VISIT (OUTPATIENT)
Dept: PHARMACY | Facility: CLINIC | Age: 46
End: 2021-08-17
Payer: COMMERCIAL

## 2021-08-17 VITALS
OXYGEN SATURATION: 97 % | WEIGHT: 315 LBS | DIASTOLIC BLOOD PRESSURE: 75 MMHG | BODY MASS INDEX: 36.45 KG/M2 | SYSTOLIC BLOOD PRESSURE: 128 MMHG | HEART RATE: 67 BPM | RESPIRATION RATE: 16 BRPM | HEIGHT: 78 IN | TEMPERATURE: 98.3 F

## 2021-08-17 DIAGNOSIS — G47.00 INSOMNIA, UNSPECIFIED TYPE: ICD-10-CM

## 2021-08-17 DIAGNOSIS — F41.8 DEPRESSION WITH ANXIETY: ICD-10-CM

## 2021-08-17 DIAGNOSIS — G89.4 CHRONIC PAIN SYNDROME: ICD-10-CM

## 2021-08-17 DIAGNOSIS — L93.2 DRUG-INDUCED LUPUS ERYTHEMATOSUS: ICD-10-CM

## 2021-08-17 DIAGNOSIS — R11.0 NAUSEA: ICD-10-CM

## 2021-08-17 DIAGNOSIS — T50.905A DRUG-INDUCED LUPUS ERYTHEMATOSUS: ICD-10-CM

## 2021-08-17 DIAGNOSIS — E11.9 TYPE 2 DIABETES MELLITUS WITHOUT COMPLICATION, WITHOUT LONG-TERM CURRENT USE OF INSULIN (H): Primary | ICD-10-CM

## 2021-08-17 PROCEDURE — 99283 EMERGENCY DEPT VISIT LOW MDM: CPT | Performed by: EMERGENCY MEDICINE

## 2021-08-17 PROCEDURE — 99607 MTMS BY PHARM ADDL 15 MIN: CPT | Performed by: PHARMACIST

## 2021-08-17 PROCEDURE — 99606 MTMS BY PHARM EST 15 MIN: CPT | Performed by: PHARMACIST

## 2021-08-17 PROCEDURE — 99282 EMERGENCY DEPT VISIT SF MDM: CPT | Performed by: EMERGENCY MEDICINE

## 2021-08-17 RX ORDER — DRONABINOL 5 MG/1
1 CAPSULE ORAL 2 TIMES DAILY
COMMUNITY
Start: 2021-08-03 | End: 2022-10-18 | Stop reason: DRUGHIGH

## 2021-08-17 RX ORDER — PREGABALIN 150 MG/1
150 CAPSULE ORAL 3 TIMES DAILY
COMMUNITY
End: 2021-12-07

## 2021-08-17 RX ORDER — PYRIDOSTIGMINE BROMIDE 60 MG/1
1 TABLET ORAL 3 TIMES DAILY
COMMUNITY
Start: 2021-07-27

## 2021-08-17 ASSESSMENT — MIFFLIN-ST. JEOR: SCORE: 2454.76

## 2021-08-17 NOTE — ED PROVIDER NOTES
[place] EMERGENCY DEPARTMENT ([facility])  8/17/21    History     Chief Complaint   Patient presents with     Derm Problem     The history is provided by the patient and medical records.     Joel Pineda is a 46 year old male with a past medical history significant for bipolar 2 disorder, type 2 diabetes mellitus, hypertension, MDD, YUDI, degenerative disc disease, ankylosing spondylitis, DVT, and PE who presents to the Emergency Department for evaluation of rash and body pain.  Patient states that he first noticed a rash on his face a couple of weeks ago. Patient has been increasing his Lamictal dose from 200 mg to 300 mg.  He states that since he has been on 300 mg he has noticed the rash on his face has become raised and more painful.  He states that he has intermittent sharp spots of pain all over his body, but mainly on his face.  He says that between the intermittent episodes of pain he has no pain.   Patient states that he has never had a rash like this before.  Patient also states that he has a sore throat and is having more headaches than usual.  Patient has red eyes, but states that he believes this is due to the synthetic THC that he is taking for nausea.  Patient denies recent illness.  No other symptoms noted.    Past Medical History  Past Medical History:   Diagnosis Date     Acne      Acquired hypothyroidism      Allergic state      Ankylosing spondylitis lumbar region (H)      Ankylosing spondylitis of sacral region (H)      Anxiety      Bipolar 2 disorder (H)      Chest pain     Chest pain, regulated w/BP meds. Clear arteries.     Chronic pain      DDD (degenerative disc disease), lumbar      Depressive disorder      Diabetes (H)      Diverticulosis      Facet arthritis of cervical region      Gastroesophageal reflux disease      Hypertension      IBS (irritable bowel syndrome)      Intracranial arachnoid cyst      Polyneuropathy      Pulmonary embolism (H)      Skin exam, screening  for cancer 12/3/2013     Sleep apnea      Uncomplicated asthma      Past Surgical History:   Procedure Laterality Date     BACK SURGERY  10/07    lumbar discectomy L5-S1     COLONOSCOPY      Note: colonoscopy scheduled with [facility] on Friday, 9/4/15     COSMETIC SURGERY  2012    Nose Exterior - functional     GI SURGERY  August 2013    Sigmoidectomy     HERNIA REPAIR, UMBILICAL  8/23/11    small, Dr. Evna Beavers     INCISION AND DRAINAGE, ABSCESS, COMPLEX  8/23/11    umbilical, Dr. Evan Beavers     LAPAROSCOPIC ASSISTED COLECTOMY LEFT (DESCENDING)  8/15/2013    Procedure: LAPAROSCOPIC ASSISTED COLECTOMY LEFT (DESCENDING);  Laparoscopic Hand Assisted Sigmoid Resection, Mobilization of Splenic Fissure, coloproctoscopy, *Latex Free Room* Anesthesia General with Pain block  ;  Surgeon: Aurora Justice MD;  Location: [facility] OR     NERVE SURGERY  8/18/11    RF ablation @ L3-S1 @ [facility]     RECONSTRUCT NOSE AND SEPTUM (FUNCTIONAL)  10/14/2011    Procedure:RECONSTRUCT NOSE AND SEPTUM (FUNCTIONAL); Functional Septorhinoplasty, Turbinate Reduction, ; Surgeon:CEDRIC CUEVAS; Location:[facility] OR     SINUS SURGERY  10/1/01    ethmoidectomy chronic sinusitis     acetaminophen 500 MG CAPS  albuterol (PROAIR HFA/PROVENTIL HFA/VENTOLIN HFA) 108 (90 Base) MCG/ACT inhaler  aspirin (ASA) 81 MG tablet  cetirizine (ZYRTEC) 10 MG tablet  cholecalciferol (D3-50) 1250 mcg (39692 units) capsule  clonazePAM (KLONOPIN) 0.5 MG tablet  cyanocobalamin (CYANOCOBALAMIN) 1000 MCG/ML injection  dronabinol (MARINOL) 5 MG capsule  DULoxetine (CYMBALTA) 60 MG capsule  EPINEPHrine (ANY BX GENERIC EQUIV) 0.3 MG/0.3ML injection 2-pack  eszopiclone (LUNESTA) 2 MG tablet  etanercept (ENBREL SURECLICK) 50 MG/ML autoinjector  famotidine (PEPCID) 20 MG tablet  fluticasone (FLONASE) 50 MCG/ACT nasal spray  fluticasone-salmeterol (ADVAIR) 250-50 MCG/DOSE inhaler  glipiZIDE (GLUCOTROL XL) 2.5 MG 24 hr tablet  lamoTRIgine (LAMICTAL) 100 MG tablet  levothyroxine  "(SYNTHROID/LEVOTHROID) 75 MCG tablet  lidocaine (XYLOCAINE) 5 % external ointment  melatonin 3 MG tablet  methocarbamol (ROBAXIN) 500 MG tablet  metoclopramide (REGLAN) 5 MG tablet  metoprolol succinate ER (TOPROL-XL) 200 MG 24 hr tablet  montelukast (SINGULAIR) 10 MG tablet  nabumetone (RELAFEN) 500 MG tablet  naloxone (NARCAN) 4 MG/0.1ML nasal spray  olopatadine (PATADAY) 0.2 % ophthalmic solution  omega-3 acid ethyl esters (LOVAZA) 1 g capsule  omeprazole 20 MG tablet  ondansetron (ZOFRAN-ODT) 8 MG ODT tab  order for DME  order for DME  order for DME  oxyCODONE (ROXICODONE) 5 MG tablet  pregabalin (LYRICA) 150 MG capsule  pyridostigmine (MESTINON) 60 MG tablet  QUEtiapine (SEROQUEL) 100 MG tablet  ramipril (ALTACE) 10 MG capsule  rizatriptan (MAXALT-MLT) 5 MG ODT  rosuvastatin (CRESTOR) 40 MG tablet  syringe, disposable, (BD TUBERCULIN SYRINGE) 1 ML MISC  syringe/needle, disp, (BD INTEGRA SYRINGE) 25G X 1\" 3 ML MISC  triamcinolone (KENALOG) 0.1 % external cream  vitamin B complex with vitamin C (STRESS TAB) tablet  ZINC SULFATE-VITAMIN C MT      Allergies   Allergen Reactions     Amoxicillin-Pot Clavulanate Difficulty breathing     Banana Shortness Of Breath     Pt reports organic Banana is okay.      Nitroglycerin Palpitations     Penicillins Anaphylaxis     Provigil [Modafinil] Shortness Of Breath     headache     Gadolinium Hives and Itching     Patient was premedicated for the contrast allergy. He did still have a reaction a few hours after injection. Hives and itching. Dr. Gomez told tech to inform pt he should only have contrast again in the future when premedicated and at a hospital. Not at an outpatient facility.      Ketoconazole      Topical cream caused swelling and itching     Dye [Contrast Dye] Other (See Comments) and Hives     Moderate flushing, CT contrast     Golimumab      Hives, bradycardia, face swelling     Neurontin [Gabapentin] Hives     Moderate hives     Nortriptyline Hives     " "Varicella Virus Vaccine Live      Rash     Flagyl [Metronidazole Hcl] Palpitations and Hives     Latex Rash     Metronidazole Palpitations, Other (See Comments) and Rash     dizziness (versus ciprofloxacin taken at same time)     Family History  Family History   Problem Relation Age of Onset     Musculoskeletal Disorder Mother         back     Anxiety Disorder Mother      Colon Polyps Mother      Ulcerative Colitis Mother         and ischemic small intestine, surgery     Hypertension Mother      Breast Cancer Mother      Osteoporosis Mother      Diabetes Mother         Type 2, Diagnosed in 2014     Depression Mother         Takes Cymbalta to help with chronic pain + depx     Thyroid Disease Mother         Hypothyroidism     Obesity Mother         Under much better control latter half of 2015     Musculoskeletal Disorder Father         back     Substance Abuse Father      Hypertension Father      Hyperlipidemia Father      Depression Father         Off meds for many years. Seems \"ok\"     Heart Disease Maternal Grandmother      Heart Disease Maternal Grandfather      Psychotic Disorder Paternal Grandfather      Suicide Paternal Grandfather      Depression Paternal Grandfather         Pediatrician. Committed suicide by pistol in 1990.     Musculoskeletal Disorder Brother         back     Depression Brother         Expressed as anger and moodiness     Substance Abuse Brother      Substance Abuse Sister      Depression Sister         Mental Health Therapist, yet no anti-depressants?     Anxiety Disorder Sister         Mental Health Therapist, yet no anti-anxiety meds?     Other Cancer Other         Bladder Cancer - Fatal     Substance Abuse Brother      Colon Cancer No family hx of      Crohn's Disease No family hx of      Anesthesia Reaction No family hx of      Cancer No family hx of         No family history of skin cancer     Social History   Social History     Tobacco Use     Smoking status: Never Smoker     " "Smokeless tobacco: Never Used   Substance Use Topics     Alcohol use: Not Currently     Alcohol/week: 0.0 standard drinks     Comment: occ 1/week     Drug use: No     Comment: synthetic THC for nausea      Past medical history, past surgical history, medications, allergies, family history, and social history were reviewed with the patient. No additional pertinent items.       Review of Systems  A complete review of systems was performed with pertinent positives and negatives noted in the HPI, and all other systems negative.    Physical Exam   BP: (!) 142/87  Pulse: 70  Temp: 98  F (36.7  C)  Resp: 16  Height: 198.1 cm (6' 6\")  Weight: 145.2 kg (320 lb)  SpO2: 97 %  Physical Exam  Vitals and nursing note reviewed.   Constitutional:       General: He is not in acute distress.     Appearance: He is well-developed. He is not diaphoretic.   HENT:      Head: Normocephalic and atraumatic.        Mouth/Throat:      Pharynx: No oropharyngeal exudate.   Eyes:      General: No scleral icterus.        Right eye: No discharge.         Left eye: No discharge.      Pupils: Pupils are equal, round, and reactive to light.   Cardiovascular:      Rate and Rhythm: Normal rate and regular rhythm.      Heart sounds: Normal heart sounds. No murmur heard.   No friction rub. No gallop.    Pulmonary:      Effort: Pulmonary effort is normal. No respiratory distress.      Breath sounds: Normal breath sounds. No wheezing.   Chest:      Chest wall: No tenderness.   Abdominal:      General: Bowel sounds are normal. There is no distension.      Palpations: Abdomen is soft.      Tenderness: There is no abdominal tenderness.   Musculoskeletal:         General: No tenderness or deformity. Normal range of motion.      Cervical back: Normal range of motion and neck supple.   Skin:     General: Skin is warm and dry.      Coloration: Skin is not pale.      Findings: Rash present. No erythema.   Neurological:      Mental Status: He is alert and oriented " to person, place, and time.      Cranial Nerves: No cranial nerve deficit.         ED Course     5:31 PM  The patient was seen and examined by Nish Coats DO in Room VTA.   Procedures              No results found for any visits on 08/17/21.  Medications - No data to display     Assessments & Plan (with Medical Decision Making)   This is a 46-year-old male who presents with rash on his face.  He also has some intermittent pains throughout his body at different places.  Symptoms started after he increased his dose of lamotrigine.  Not had similar occurrences in the past.  On exam patient has a raised erythematous rash on both cheeks.  This does have the appearance of a malar rash.  This could be secondary to the increase in his lamotrigine causing a lupus like syndrome. I discussed this with Pharmacy who notes that symptoms could be due to the increase in the medication. I discussed this with patient.  Discussed options including returning to the initial dose or discontinuing the medication.  He opted to discontinue the medication and has an appointment with a psychiatrist on Friday for an alternate medication. Will discharge home with return precautions. Discussed reasons to return to the emergency department.  Patient understands and agrees with this plan.    I have reviewed the nursing notes. I have reviewed the findings, diagnosis, plan and need for follow up with the patient.    New Prescriptions    No medications on file       Final diagnoses:   None     Luz DEVIRES, am serving as a trained medical scribe to document services personally performed by Nish Coats DO, based on the provider's statements to me.     Nish DEVRIES DO, was physically present and have reviewed and verified the accuracy of this note documented by Luz Rowley.  --  Nish Coats DO  [facility] EMERGENCY DEPARTMENT  8/17/2021     Nish Coats DO  08/17/21 2047

## 2021-08-17 NOTE — DISCHARGE INSTRUCTIONS
Continue with scheduled follow-up. Return to the emergency department if symptoms continue, get worse, there are any new symptoms or any cause for concern.

## 2021-08-17 NOTE — PATIENT INSTRUCTIONS
Recommendations from today's MTM visit:                                                      Today we reviewed what your medicines are for, how to know if they are working, that your medicines are safe and how to make your medicine regimen as easy as possible.      Recommend seeking evaluation for rash.  Recommend A1c; order placed.    Follow-up: 10 weeks    It was great to speak with you today.  I value your experience and would be very thankful for your time with providing feedback on our clinic survey. You may receive a survey via email or text message in the next few days.     To schedule another MTM appointment, please call the clinic directly or you may call the MTM scheduling line at [phone] or toll-free at [phone].     My Clinical Pharmacist's contact information:                                                      Please feel free to contact me with any questions or concerns you have.      Radha Mcdonald, Pharm.D, BCACP  Medication Therapy Management Pharmacist

## 2021-08-17 NOTE — PROGRESS NOTES
Medication Therapy Management (MTM) Encounter    ASSESSMENT:                            Medication Adherence/Access: No issues identified    Mood/Anxiety/Insomnia: Recommend going into be evaluated for new rash.    Pain: Stable; patient followed closely by pain clinic    Nausea: Stable     Diabetes: Would benefit from updated A1c and may need to consider discontinuing glipizide due to increased hypoglycemia.    PLAN:                          Recommend seeking evaluation for rash.  Recommend A1c; order placed.    Follow-up: 10 weeks    SUBJECTIVE/OBJECTIVE:                          Joel Pineda is a 46 year old male called for a follow up from 6/1/2021. He was referred to me from Ksenia Lyles.      Reason for visit:     Allergies/ADRs: Reviewed in chart  Tobacco: He reports that he has never smoked. He has never used smokeless tobacco.  Alcohol: none  Caffeine: 36 ounces/day of coffee  Activity: None  Past Medical History: Reviewed in chart    Medication Adherence/Access: no issues reported     Mood/Anxiety/Insomnia: current therapy includes lamictal 300mg daily (patient noticed a raised rash on his face, eyes are red, neck after increase in dose), duloxetine 120mg once daily, clonazepam 0.5mg twice daily (usually just taking at bedtime), Lunesta 3mg at bedtime,  quetiapine 100mg at bedtime,  melatonin 12mg at bedtime.  Rejoined 2 hour a week group therapy with Gianna and had a CBT appointment [place]. Patient's psychiatrist is Dr. Rodriguez at [facility] in [place].     Pain: current therapy includes APAP as needed-patient is taking  500 mg 2 times daily, lyrica 150mg twice daily, oxycodone 5-10mg every 6 hours as needed maximum of 6 tablets/day (usually takes 1-2 tablets a day), methocarbamol 500mg-1000mg four times daily as needed (makes him tired so will reserve for afternoon/evening),  nabumetone 500 twice daily, Narcan nasal spray as needed.     Nausea: current therapy includes  ondansetron ODT 8mg every 8 hours as needed, reglan 5mg in the morning and 5mg at night, Mestinon 60mg five times daily. Patient has found this very helpful.   Patient was certified for medical marijuana and tried a sublingual tincture which helped. [facility] then prescribed mariniol 5mg twice daily. Nausea is much improved. Denies side effects.    Diabetes:  Pt currently taking glipizide xl 2.5mg daily. Pt is not experiencing side effects.  SMBG: rarely.     Patient was getting shaky and sweating but have lessened since decreasing glipizide dose. Still feels like he needs to grab lunch by 1pm because he gets hypoglycemic.  Recent symptoms of high blood sugar? none  Eye exam: up to date  Foot exam:  Up to date  ACEi/ARB: Yes: Ramipril.   Urine Albumin:   Lab Results   Component Value Date    MICROL 8 10/14/2020       Hemoglobin A1C   Date Value Ref Range Status   02/10/2021 6.5 (H) 0 - 5.6 % Final     Comment:     Normal <5.7% Prediabetes 5.7-6.4%  Diabetes 6.5% or higher - adopted from ADA   consensus guidelines.        Aspirin: Taking 81mg daily and denies side effects    Today's Vitals: There were no vitals taken for this visit.  ----------------    I spent 25 minutes with this patient today. All changes were made via collaborative practice agreement with Ksenia Lyles . A copy of the visit note was provided to the patient's primary care provider.    The patient was sent via [employer] a summary of these recommendations.     Radha Mcdonald, Pharm.D, BCACP  Medication Therapy Management Pharmacist    Telemedicine Visit Details  Type of service:  Telephone visit  Start Time: 2:01PM  End Time: 2:26M  Originating Location (patient location): Home  Distant Location (provider location):  [facility]        Medication Therapy Recommendations  Type 2 diabetes mellitus without complication, without long-term current use of insulin (H)    Current Medication: glipiZIDE (GLUCOTROL XL) 2.5 MG 24 hr  tablet   Rationale: Medication requires monitoring - Needs additional monitoring   Recommendation: Order Lab   Status: Accepted per CPA

## 2021-08-18 ENCOUNTER — OFFICE VISIT (OUTPATIENT)
Dept: SURGERY | Facility: CLINIC | Age: 46
End: 2021-08-18
Payer: MEDICARE

## 2021-08-18 VITALS
BODY MASS INDEX: 36.45 KG/M2 | HEIGHT: 78 IN | SYSTOLIC BLOOD PRESSURE: 124 MMHG | DIASTOLIC BLOOD PRESSURE: 73 MMHG | WEIGHT: 315 LBS | HEART RATE: 73 BPM | OXYGEN SATURATION: 97 %

## 2021-08-18 DIAGNOSIS — K59.4 PROCTALGIA FUGAX: Primary | ICD-10-CM

## 2021-08-18 PROCEDURE — 99212 OFFICE O/P EST SF 10 MIN: CPT | Performed by: SURGERY

## 2021-08-18 ASSESSMENT — PAIN SCALES - GENERAL: PAINLEVEL: MILD PAIN (3)

## 2021-08-18 ASSESSMENT — MIFFLIN-ST. JEOR: SCORE: 2453.4

## 2021-08-18 NOTE — NURSING NOTE
"Chief Complaint   Patient presents with     RECHECK     anal fissure follow up       Vitals:    08/18/21 1245   BP: 124/73   BP Location: Left arm   Patient Position: Sitting   Cuff Size: Adult Large   Pulse: 73   SpO2: 97%   Weight: 319 lb 11.2 oz   Height: 6' 6\"       Body mass index is 36.95 kg/m .    Leila Good CMA    "

## 2021-08-18 NOTE — LETTER
2021       RE: Joel Pineda  [id number] [facility] [facility] [place] [address]     Dear Colleague,    Thank you for referring your patient, Joel Pineda, to the [facility] COLON AND RECTAL SURGERY CLINIC [place] [facility]. Please see a copy of my visit note below.    Colon and Rectal Surgery Clinic Note    RE: Joel Pineda.  : 1975.  MARY: 2021.    Reason for visit: anal pain     HPI (as seen by me on 10/14/20): 44M with history of diverticular disease s/p sigmoidectomy with Dr. Justice in . He has most recently been seen for recurrent perineal abscess. He has had abscesses in past, most recently drained in local ED in . He was subsequently seen by Dr. Zhu. This healed and he came in for evaluation of underlying cyst or fistula as a cause for recurrent infection. No lesions were seen on exam. It was recommended he continue treatment for pruritis ani, and follow up as needed. He now presents with complaints of sharp pain, intermittent. Had some blood in stool a few months, nothing recently. Occasionally blood on TP. Currently on antibiotic for toe infection. Was having diarrhea due to metformin, improved with glipizide. Also doing baths every other day, calmoseptine helps as well. Unclear if pain with BM. Pain has been ongoing. Takes benefiber which helps. On Etanercept for ankylosing spondylitis.     Last colonoscopy was 9/15/20. Cecal and ascending colon polyps were found, path consistent with TA. EGD was normal.    -Last visit, plan was to initiate diltiazem, fiber and water to manage a posterior midline anal fissure.    Interval Hx: Remains with pain, intermittent bleeding on and off. The pain is not primarily related to defecation now, but occurs randomly. He does have to strain with BMs. No bleeding. BMs are soft. Using diltiazem, lidocaine creams.    Medical history:  Past Medical History:   Diagnosis Date     Acne       Acquired hypothyroidism      Allergic state      Ankylosing spondylitis lumbar region (H)      Ankylosing spondylitis of sacral region (H)      Anxiety      Bipolar 2 disorder (H)      Chest pain     Chest pain, regulated w/BP meds. Clear arteries.     Chronic pain      DDD (degenerative disc disease), lumbar      Depressive disorder      Diabetes (H)      Diverticulosis      Facet arthritis of cervical region      Gastroesophageal reflux disease      Hypertension      IBS (irritable bowel syndrome)      Intracranial arachnoid cyst      Polyneuropathy      Pulmonary embolism (H)      Skin exam, screening for cancer 12/3/2013     Sleep apnea      Uncomplicated asthma        Surgical history:  Past Surgical History:   Procedure Laterality Date     BACK SURGERY  10/07    lumbar discectomy L5-S1     COLONOSCOPY      Note: colonoscopy scheduled with [facility] on Friday, 9/4/15     COSMETIC SURGERY  2012    Nose Exterior - functional     GI SURGERY  August 2013    Sigmoidectomy     HERNIA REPAIR, UMBILICAL  8/23/11    Dr. Evan whiting     INCISION AND DRAINAGE, ABSCESS, COMPLEX  8/23/11    umbilical, Dr. Evan Beavers     LAPAROSCOPIC ASSISTED COLECTOMY LEFT (DESCENDING)  8/15/2013    Procedure: LAPAROSCOPIC ASSISTED COLECTOMY LEFT (DESCENDING);  Laparoscopic Hand Assisted Sigmoid Resection, Mobilization of Splenic Fissure, coloproctoscopy, *Latex Free Room* Anesthesia General with Pain block  ;  Surgeon: Aurora Justice MD;  Location: [facility] OR     NERVE SURGERY  8/18/11    RF ablation @ L3-S1 @ [facility]     RECONSTRUCT NOSE AND SEPTUM (FUNCTIONAL)  10/14/2011    Procedure:RECONSTRUCT NOSE AND SEPTUM (FUNCTIONAL); Functional Septorhinoplasty, Turbinate Reduction, ; Surgeon:CEDRIC CUEVAS; Location:[facility] OR     SINUS SURGERY  10/1/01    ethmoidectomy chronic sinusitis       Family history:  Family History   Problem Relation Age of Onset     Musculoskeletal Disorder Mother         back     Anxiety Disorder Mother       "Colon Polyps Mother      Ulcerative Colitis Mother         and ischemic small intestine, surgery     Hypertension Mother      Breast Cancer Mother      Osteoporosis Mother      Diabetes Mother         Type 2, Diagnosed in 2014     Depression Mother         Takes Cymbalta to help with chronic pain + depx     Thyroid Disease Mother         Hypothyroidism     Obesity Mother         Under much better control latter half of 2015     Musculoskeletal Disorder Father         back     Substance Abuse Father      Hypertension Father      Hyperlipidemia Father      Depression Father         Off meds for many years. Seems \"ok\"     Heart Disease Maternal Grandmother      Heart Disease Maternal Grandfather      Psychotic Disorder Paternal Grandfather      Suicide Paternal Grandfather      Depression Paternal Grandfather         Pediatrician. Committed suicide by pistol in 1990.     Musculoskeletal Disorder Brother         back     Depression Brother         Expressed as anger and moodiness     Substance Abuse Brother      Substance Abuse Sister      Depression Sister         Mental Health Therapist, yet no anti-depressants?     Anxiety Disorder Sister         Mental Health Therapist, yet no anti-anxiety meds?     Other Cancer Other         Bladder Cancer - Fatal     Substance Abuse Brother      Colon Cancer No family hx of      Crohn's Disease No family hx of      Anesthesia Reaction No family hx of      Cancer No family hx of         No family history of skin cancer     No Hx of IBD or GI malignancy    Medications:  Current Outpatient Medications   Medication Sig Dispense Refill     acetaminophen 500 MG CAPS Take 1,000 mg by mouth 2 times daily  60 capsule      albuterol (PROAIR HFA/PROVENTIL HFA/VENTOLIN HFA) 108 (90 Base) MCG/ACT inhaler Inhale 2 puffs into the lungs every 4 hours as needed for shortness of breath / dyspnea or wheezing 8.5 g 11     aspirin (ASA) 81 MG tablet Take 81 mg by mouth daily        cetirizine (ZYRTEC) " 10 MG tablet Take 1 tablet (10 mg) by mouth At Bedtime 30 tablet 11     cholecalciferol (D3-50) 1250 mcg (38836 units) capsule TAKE 1 CAPSULE BY MOUTH EVERY 2 WEEKS 24 capsule 0     clonazePAM (KLONOPIN) 0.5 MG tablet Take 0.5 mg by mouth 2 times daily as needed        cyanocobalamin (CYANOCOBALAMIN) 1000 MCG/ML injection Inject 1 mL (1,000 mcg) into the muscle every 30 days 10 mL 0     DULoxetine (CYMBALTA) 60 MG capsule Take 120 mg by mouth daily        EPINEPHrine (ANY BX GENERIC EQUIV) 0.3 MG/0.3ML injection 2-pack Inject 0.3 mLs (0.3 mg) into the muscle once as needed for anaphylaxis 0.6 mL 3     eszopiclone (LUNESTA) 2 MG tablet Take 2 mg by mouth At Bedtime       etanercept (ENBREL SURECLICK) 50 MG/ML autoinjector Inject 50 mg Subcutaneous once a week . Hold for signs of infection, and seek medical attention. 4 mL 12     famotidine (PEPCID) 20 MG tablet Prior to administration of Humira every 2 weeks (Patient taking differently: Take 20 mg by mouth every 7 days Prior to Enbrel Injections to prevent skin reaction)       fluticasone (FLONASE) 50 MCG/ACT nasal spray Spray 1 spray into both nostrils daily       fluticasone-salmeterol (ADVAIR) 250-50 MCG/DOSE inhaler Inhale 1 puff into the lungs every 12 hours 14 each 11     glipiZIDE (GLUCOTROL XL) 2.5 MG 24 hr tablet TAKE 1 TABLET BY MOUTH ONCE DAILY  90 tablet 0     lamoTRIgine (LAMICTAL) 100 MG tablet Take 200 mg by mouth daily       levothyroxine (SYNTHROID/LEVOTHROID) 75 MCG tablet TAKE 1 TABLET EVERY MORNING 90 tablet 3     lidocaine (XYLOCAINE) 5 % external ointment Apply topically 4 times daily as needed (pain) 50 g 2     melatonin 3 MG tablet Take 12 mg by mouth nightly as needed        methocarbamol (ROBAXIN) 500 MG tablet Take 1-2 tablets (500-1,000 mg) by mouth 4 times daily as needed for muscle spasms 240 tablet 1     metoclopramide (REGLAN) 5 MG tablet Take 5 mg by mouth 2 times daily       metoprolol succinate ER (TOPROL-XL) 200 MG 24 hr tablet  TAKE 1 TABLET BY MOUTH TWICE DAILY  180 tablet 0     montelukast (SINGULAIR) 10 MG tablet Take 1 tablet (10 mg) by mouth every evening 90 tablet 3     nabumetone (RELAFEN) 500 MG tablet Take 1-2 tablets (500-1,000 mg) by mouth 2 times daily as needed for moderate pain (with food) 120 tablet 2     naloxone (NARCAN) 4 MG/0.1ML nasal spray Spray 1 spray (4 mg) into one nostril alternating nostrils as needed for opioid reversal every 2-3 minutes until assistance arrives 0.2 mL 0     olopatadine (PATADAY) 0.2 % ophthalmic solution Place 1 drop into both eyes daily 2.5 mL 3     omega-3 acid ethyl esters (LOVAZA) 1 g capsule TAKE 2 CAPSULES BY MOUTH TWICE DAILY  360 capsule 1     omeprazole 20 MG tablet Take 20 mg by mouth daily        ondansetron (ZOFRAN-ODT) 8 MG ODT tab Take 8 mg by mouth every 8 hours as needed for nausea       order for DME Equipment being ordered: Assure Compression stockings (ANNETTA) Large, closed toe, thigh-high 30-40 compression 2 Units 3     order for DME Equipment being ordered: lumbosacral belt/brace 1 Units 0     order for DME Respironics REMSTAR 60 Series Auto CPAP 9-13 cm H2O, Wisp nasal mask w/a large cushion and a chinstrap       oxyCODONE (ROXICODONE) 5 MG tablet Take 1 tablet (5 mg) by mouth every 6 hours as needed for pain (maximum 6 tablet(s) per day) . Acute on chronic pain 35 tablet 0     polyethylene glycol (MIRALAX) 17 g packet Take 1 packet by mouth daily       pregabalin (LYRICA) 150 MG capsule Take 1 capsule (150 mg) by mouth 3 times daily 90 capsule 5     QUEtiapine (SEROQUEL) 100 MG tablet Take 100 mg by mouth At Bedtime       ramipril (ALTACE) 10 MG capsule Take 1 capsule (10 mg) by mouth daily 90 capsule 1     rizatriptan (MAXALT-MLT) 5 MG ODT DISSOLVE 1 TABLET BY MOUTH AT ONSET OF HEADACHE 30 tablet 0     rosuvastatin (CRESTOR) 40 MG tablet Take 1 tablet (40 mg) by mouth daily 90 tablet 2     syringe, disposable, (BD TUBERCULIN SYRINGE) 1 ML MISC Equipment being ordered: 1 ml  "tuberculin syringes to be used for Vitamin B12 injections. 12 each 11     syringe/needle, disp, (BD INTEGRA SYRINGE) 25G X 1\" 3 ML MISC USE FOR VITAMIN B12 INJECTIONS 12 each 0     triamcinolone (KENALOG) 0.1 % external cream Apply topically 2 times daily as needed for irritation 30 g 2     vitamin B complex with vitamin C (STRESS TAB) tablet Take 1 tablet by mouth daily       ZINC SULFATE-VITAMIN C MT Take 1 tablet by mouth daily         Allergies:  Allergies   Allergen Reactions     Amoxicillin-Pot Clavulanate Difficulty breathing     Banana Shortness Of Breath     Pt reports organic Banana is okay.      Nitroglycerin Palpitations     Penicillins Anaphylaxis     Provigil [Modafinil] Shortness Of Breath     headache     Gadolinium Hives and Itching     Patient was premedicated for the contrast allergy. He did still have a reaction a few hours after injection. Hives and itching. Dr. Gomez told tech to inform pt he should only have contrast again in the future when premedicated and at a hospital. Not at an outpatient facility.      Ketoconazole      Topical cream caused swelling and itching     Dye [Contrast Dye] Other (See Comments) and Hives     Moderate flushing, CT contrast     Golimumab      Hives, bradycardia, face swelling     Neurontin [Gabapentin] Hives     Moderate hives     Nortriptyline Hives     Varicella Virus Vaccine Live      Rash     Flagyl [Metronidazole Hcl] Palpitations and Hives     Latex Rash     Metronidazole Palpitations, Other (See Comments) and Rash     dizziness (versus ciprofloxacin taken at same time)       Social history:   Social History     Tobacco Use     Smoking status: Never Smoker     Smokeless tobacco: Never Used   Substance Use Topics     Alcohol use: Not Currently     Alcohol/week: 0.0 standard drinks     Comment: occ 1/week     Marital status: single.    ROS:  A complete review of systems was performed with the patient and all systems negative except as per " "HPI.    Physical Examination:  Exam was chaperoned by Daisy Calabrese  /73 (BP Location: Left arm, Patient Position: Sitting, Cuff Size: Adult Large)   Pulse 73   Ht 1.981 m (6' 6\")   Wt 145 kg (319 lb 11.2 oz)   SpO2 97%   BMI 36.95 kg/m    General: Well hydrated. No acute distress.  Perianal external examination:  Perianal skin: Intact with no excoriation or lichenification.  Lesions: No evidence of an external lesion, nodularity, or induration in the perianal region.  Eversion of buttocks: There was not evidence of an anal fissure. Details: A scar was present in the posterior midline indicative of healed fissure.  Skin tags or external hemorrhoids: None.  Digital rectal examination: Was performed.   Sphincter tone: Good.  Palpable lesions: No.  Other: None.  Bimanual examination: was not performed.    Anoscopy: Was performed.   Hemorrhoids: No significant internal hemorrhoids.  Lesions: No    Laboratory values reviewed:  Recent Labs   Lab Test 10/16/20  0824 10/09/20  1550 03/26/15  1421 03/18/15  0912   WBC  --  9.6  --   --    HGB  --  15.2  --   --    PLT  --  264  --   --    CR 1.28* 0.77   < >  --    ALBUMIN  --  4.3   < >  --    BILITOTAL  --  0.4   < >  --    ALKPHOS  --  113   < >  --    ALT  --  53   < >  --    AST  --  53*   < >  --    INR  --   --   --  0.90    < > = values in this interval not displayed.         ASSESSMENT  1. Hx of diverticulitis s/p sigmoid colectomy in 2013.  2. Hx of recurrent perianal abscesses.  3. 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    PLAN  1. Since no fissure was seen, no further need for diltiazem. Continue fiber.  2. I recommend physical therapy at this time.    Risks, benefits, and alternatives of operative treatment were thoroughly discussed with the patient, he understands these well and agrees to proceed.    Time spent: 15 minutes.  >50% spent in " discussing, counseling and coordinating care.    Jeremy Jimenez M.D    Division of Colon and Rectal Surgery  [facility]    Referring Provider:  Referred Self, MD  No address on file     Primary Care Provider:  Ksenia Lyles      Again, thank you for allowing me to participate in the care of your patient.      Sincerely,    Jeremy Jimenez MD, MD

## 2021-08-19 ENCOUNTER — LAB (OUTPATIENT)
Dept: LAB | Facility: CLINIC | Age: 46
End: 2021-08-19
Payer: MEDICARE

## 2021-08-19 DIAGNOSIS — E11.9 TYPE 2 DIABETES MELLITUS WITHOUT COMPLICATION, WITHOUT LONG-TERM CURRENT USE OF INSULIN (H): ICD-10-CM

## 2021-08-19 LAB — HBA1C MFR BLD: 6 % (ref 0–5.6)

## 2021-08-19 PROCEDURE — 36415 COLL VENOUS BLD VENIPUNCTURE: CPT

## 2021-08-19 PROCEDURE — 83036 HEMOGLOBIN GLYCOSYLATED A1C: CPT

## 2021-08-24 ENCOUNTER — MYC REFILL (OUTPATIENT)
Dept: FAMILY MEDICINE | Facility: CLINIC | Age: 46
End: 2021-08-24

## 2021-08-24 DIAGNOSIS — M51.369 DDD (DEGENERATIVE DISC DISEASE), LUMBAR: ICD-10-CM

## 2021-08-24 DIAGNOSIS — M47.816 LUMBAR FACET ARTHROPATHY: ICD-10-CM

## 2021-08-24 DIAGNOSIS — M45.8 ANKYLOSING SPONDYLITIS OF SACRAL REGION (H): ICD-10-CM

## 2021-08-25 ENCOUNTER — HOSPITAL ENCOUNTER (OUTPATIENT)
Dept: BEHAVIORAL HEALTH | Facility: CLINIC | Age: 46
End: 2021-08-25
Attending: PSYCHIATRY & NEUROLOGY
Payer: MEDICARE

## 2021-08-25 PROCEDURE — 90853 GROUP PSYCHOTHERAPY: CPT | Mod: PO | Performed by: SOCIAL WORKER

## 2021-08-25 RX ORDER — OXYCODONE HYDROCHLORIDE 5 MG/1
5 TABLET ORAL EVERY 6 HOURS PRN
Qty: 35 TABLET | Refills: 0 | Status: SHIPPED | OUTPATIENT
Start: 2021-08-25 | End: 2021-09-16

## 2021-08-25 NOTE — GROUP NOTE
"Process Group Note    PATIENT'S NAME: Joel Pineda  MRN:   [id number]  :   1975  ACCT. NUMBER: [id number]  DATE OF SERVICE: 21  START TIME:  1:00 PM  END TIME:  2:50 PM  FACILITATOR: Magali Wayne LICSW  TOPIC:  Process Group    Diagnoses:   296.33 (F33.2) Major Depressive Disorder, Recurrent Episode, Severe.  4. Other Diagnoses that is relevant to services:   300.02 (F41.1) Generalized Anxiety Disorder.      [facility] Mental Health Day Treatment  TRACK: clinic 1    NUMBER OF PARTICIPANTS: 7                                    Service Modality:  Video Visit     Telemedicine Visit: The patient's condition can be safely assessed and treated via synchronous audio and visual telemedicine encounter.      Reason for Telemedicine Visit: Services only offered telehealth and covid19    Originating Site (Patient Location): Patient's home    Distant Site (Provider Location): Provider Remote Setting- Home Office    Consent:  The patient/guardian has verbally consented to: the potential risks and benefits of telemedicine (video visit) versus in person care; bill my insurance or make self-payment for services provided; and responsibility for payment of non-covered services.     Patient would like the video invitation sent by:  My Chart    Mode of Communication:  Video Conference via Medical Zoom    As the provider I attest to compliance with applicable laws and regulations related to telemedicine.               Data:    Session content: At the start of this group, patients were invited to check in by identifying themselves, describing their current emotional status, and identifying issues to address in this group.   Area(s) of treatment focus addressed in this session included Symptom Management and Personal Safety.  Tito reports feeling \"relatively stable\".  Reports recovering from medication change that had a rash as side effects.  Was grateful at being able to spend time with father last " week.  During the second hour the group discussed anger    Therapeutic Interventions/Treatment Strategies:  Psychotherapist offered support, feedback and validation and reinforced use of skills. Treatment modalities used include Dialectical Behavioral Therapy.    Assessment:    Patient response:   Patient responded to session by accepting feedback, giving feedback, listening, focusing on goals, being attentive, accepting support and verbalizing understanding    Possible barriers to participation / learning include: and no barriers identified    Health Issues:   None reported       Substance Use Review:   Substance Use: No active concerns identified.    Mental Status/Behavioral Observations  Appearance:   Appropriate   Eye Contact:   Good   Psychomotor Behavior: Normal   Attitude:   Cooperative   Orientation:   All  Speech   Rate / Production: Normal    Volume:  Normal   Mood:    Depressed  Normal  Affect:    Appropriate   Thought Content:   Clear  Thought Form:  Coherent  Logical     Insight:    Good     Plan:     Safety Plan: No current safety concerns identified.  Recommended that patient call 911 or go to the local ED should there be a change in any of these risk factors.     Barriers to treatment: None identified    Patient Contracts (see media tab):  None    Substance Use: Not addressed in session     Continue or Discharge: Patient will continue in Adult Day Treatment (ADT)  as planned. Patient is likely to benefit from learning and using skills as they work toward the goals identified in their treatment plan.      Magali Wayne, [facility]  August 25, 2021

## 2021-08-27 ENCOUNTER — HOSPITAL ENCOUNTER (OUTPATIENT)
Dept: BEHAVIORAL HEALTH | Facility: CLINIC | Age: 46
End: 2021-08-27
Attending: PSYCHIATRY & NEUROLOGY
Payer: MEDICARE

## 2021-08-27 PROCEDURE — 99212 OFFICE O/P EST SF 10 MIN: CPT | Mod: 95 | Performed by: NURSE PRACTITIONER

## 2021-08-27 NOTE — PROGRESS NOTES
"[facility], [place]   Psychiatric Progress Note        Interim History:   The patient has a history of bipolar disorder, borderline personality disorder, and multiple physical conditions.  He was seen by me for initial appointment at on 7/22/2021.  At that time, the patient reported high depression and anxiety.  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  Apparently, his Lamictal was increased from 200 to 300 mg and that the rash started shortly after.  He was having significant pain as well.  As soon as he stopped the Lamictal, the rash and the pain disappeared.  He had other medication changes as well.    Today, the patient reports feeling \"okay\".  His depression is high, anxiety is moderate.  He is sleeping well with medications.  He is not taking naps during the day.  He reports having \"dark days in the last few weeks\", however, denies suicidal ideation.  The patient reports increasing Seroquel to 200 mg however, he was not able to tolerated, \"it knocked me out\".  The Seroquel was decreased by his back to 100 mg.  The dose of Klonopin was actually decreased to 0.5 mg at bedtime only.  He continues to take Lunesta and Cymbalta at the previous doses.  He had a new therapist from [facility] and is excited.  He will see her once a week.  Next week will be session #2.  The patient will discuss with his psychiatrist the possibility of adding lithium or Depakote.  He has been on these medications in the past and have been able to tolerate them.  The patient inquiry about the genetic testing he is sent to the program through my chart.  He will check if they have received it and will request to forwarded to me.  At the moment, the patient does not have questions or concerns.         Medications:   Klonopin 0.5 mg at bedtime  Cymbalta 120 mg in the morning  Seroquel 100 mg at bedtime  Lunesta 3 mg at " bedtime       Allergies:     Allergies   Allergen Reactions     Amoxicillin-Pot Clavulanate Difficulty breathing     Banana Shortness Of Breath     Pt reports organic Banana is okay.      Nitroglycerin Palpitations     Penicillins Anaphylaxis     Provigil [Modafinil] Shortness Of Breath     headache     Gadolinium Hives and Itching     Patient was premedicated for the contrast allergy. He did still have a reaction a few hours after injection. Hives and itching. Dr. Gomez told tech to inform pt he should only have contrast again in the future when premedicated and at a hospital. Not at an outpatient facility.      Ketoconazole      Topical cream caused swelling and itching     Dye [Contrast Dye] Other (See Comments) and Hives     Moderate flushing, CT contrast     Golimumab      Hives, bradycardia, face swelling     Neurontin [Gabapentin] Hives     Moderate hives     Nortriptyline Hives     Varicella Virus Vaccine Live      Rash     Flagyl [Metronidazole Hcl] Palpitations and Hives     Latex Rash     Metronidazole Palpitations, Other (See Comments) and Rash     dizziness (versus ciprofloxacin taken at same time)          Labs:     Recent Results (from the past 672 hour(s))   Hemoglobin A1c    Collection Time: 08/19/21  3:20 PM   Result Value Ref Range    Hemoglobin A1C 6.0 (H) 0.0 - 5.6 %            Psychiatric Examination:                      Weight is 0 lbs 0 oz  There is no height or weight on file to calculate BMI.    Appearance: well groomed  Attitude:  cooperative  Eye Contact:  good  Mood:  anxious, depressed and better  Affect:  appropriate and in normal range  Speech:  clear, coherent  Psychomotor Behavior:  no evidence of tardive dyskinesia, dystonia, or tics  Throught Process:  logical and goal oriented  Associations:  no loose associations  Thought Content:  no evidence of suicidal ideation or homicidal ideation  Insight:  good  Judgement:  intact  Oriented to:  time, person, and place  Attention  Span and Concentration:  intact  Recent and Remote Memory:  intact         Precautions:           DIagnoses:   1.  Bipolar 2 disorder, currently depressed, severe, without psychosis  2.  Generalized anxiety disorder  3.  Borderline personality disorder         Plan:     Treatment Objective(s) Addressed in This Session:  The purpose of today's call is for this author to provide oversight of patient's care while receiving program services. Specific treatment goals addressed included personal safety, symptoms stabilization and management, wellness and mental health, and community resources/discharge planning.      Continue medications as above.      This author or another day program provider will follow up with the patient in approximately 30 days.     Patient continues to meet criteria for recommended level of care: day program  Patient would be at reasonable risk of requiring a higher level of care in the absence of current services.     Patient does agree with the current plan of care.           Video-Visit Details     Type of service:  Video Visit     Video Start Time (time video started): 1435     Video End Time (time video stopped): 1447    Originating Location (pt. Location): Home     Distant Location (provider location): Provider remote location     Mode of Communication:  Video Conference via [facility]     Physician has received verbal consent for a Video Visit from the patient? Yes       Luis Antonio BANKS CNP  Date: 08/27/21  Time: 2:49 PM       This note was created with the help of Dragon dictation system. All grammatical/typing errors or context distortion are unintentional and inherent to software.

## 2021-08-30 ENCOUNTER — MYC REFILL (OUTPATIENT)
Dept: FAMILY MEDICINE | Facility: CLINIC | Age: 46
End: 2021-08-30

## 2021-08-30 ENCOUNTER — TELEPHONE (OUTPATIENT)
Dept: BEHAVIORAL HEALTH | Facility: CLINIC | Age: 46
End: 2021-08-30

## 2021-08-30 DIAGNOSIS — G43.109 MIGRAINE WITH AURA AND WITHOUT STATUS MIGRAINOSUS, NOT INTRACTABLE: ICD-10-CM

## 2021-08-30 NOTE — PROGRESS NOTES
LOCUS Worksheet     Name: Joel Pineda MRN: [id number]    : 1975      Gender:  male    PMI:     Provider Name: SCOTT Posada   Provider NPI:  [id number]    Actual level of Care Provided:  OP group therapy    Service(s) receiving or referred to:  Once per week outpatient to 9 hours program    Reason for Variance: none      Rating completed by: SCOTT Posada        I. Risk of Harm:   3      Moderate Risk of Harm    II. Functional Status:   3      Moderate Impairment    III. Co-Morbidity:   2      Minor Co-Morbidity    IV - A. Recovery Environment - Level of Stress:   2      Mildly Stressful Environment    IV - B. Recovery Environment - Level of Support:   3      Limited Support in Environment    V. Treatment and Recovery History:   3      Moderate to Equivocal Response to Treatment and Recovery Management    VI. Engagement and Recovery Project:   2      Positive Engagement and Recovery       18 Composite Score    Level of Care Recommendation:   17 to 19       High Intensity Community Based Services

## 2021-08-30 NOTE — TELEPHONE ENCOUNTER
Writer received a phone call from Pt today stating he has had an increase in symptoms over the past week and is thinking it would be a better plan to increase to the 3day per week program at present time.  He stated about a 1.5 weeks ago he had a rash on his face and went to the ED.  As a result of this side effect, he was taken off of lamictal.  Symptoms have increased.  He is working closely with his psychiatric provider around different options such as returning to lithium.  Had been taking that in the distant past.      He is unable to start tomorrow due to a different therapy conflict.  He is able to start in the 4B track on Thursday.  He will still attend the Clinic track on Wednesday and then discharge.  Will inform all team members of this treatment plan change.

## 2021-08-31 NOTE — PROGRESS NOTES
Adult Mental Health Intensive Outpatient Discharge Summary/Instructions      Patient: Joel Pineda MRN: [id number]   : 1975 Age: 46 year old Sex: male     Admission Date: 21  Discharge Date: 21  Diagnosis:  296.33 (F33.2) Major Depressive Disorder, Recurrent Episode, Severe.  4. Other Diagnoses that is relevant to services:   300.02 (F41.1) Generalized Anxiety Disorder.      Focus of Treatment / Progress    Personal Safety:      * Follow your safety plan     * Call crisis lines as needed:    [facility] [phone] [facility] [phone]  [facility] [phone] Crisis Connection [phone]  [facility] [phone] [facility] [phone]  [facility] [phone] National Suicide Prevention [phone]  [facility] [phone] Suicide Prevention [phone]  [facility] [phone]    Managing symptoms of:  Depression and anxiety    Community support/health:  Continue to build health lifestyle routines.  Reach out to family and friends for support    Managing Symptoms and Preventing Relapse    * Go to all of your appointments    * Take all medications as directed.      * Carry a current list if medication with you    * Do not use illicit (street) drugs.  Avoid alcohol    * Report these symptoms to your care team. These are early signs of relapse:   Thoughts of suicide   Losing more sleep   Increased confusion   Mood getting worse   Feeling more aggressive   Other:  Difficulty with daily living    *Use these skills daily:  Self compassion, mindfulness, opposite to emotion, radical acceptance    Copy of summary sent to: client via [employer]    Follow up with psychiatrist / main caregiver: [facility]. Dr. De Dios    Next visit: process of being scheduled    [facility]  Next visit: to be determined    Go to group therapy and / or support groups at: Your are scheduled to start the 4B  track on Thursday, September 2.  The group meets Monday, Tuesday and Thursday 1-4pm  See your medical doctor about: any medical concerns.    Other:  Your treatment team appreciates having the opportunity to work with you and wishes you the best.    Client Signature:_unable to sign [id number]______________________  Date / Time:___9/1/21 1500________  Staff Signature:__SCOTT Joseph, ______________________   Date / Time:_9/1/21 1500__________

## 2021-09-01 ENCOUNTER — HOSPITAL ENCOUNTER (OUTPATIENT)
Dept: BEHAVIORAL HEALTH | Facility: CLINIC | Age: 46
End: 2021-09-01
Attending: PSYCHIATRY & NEUROLOGY
Payer: MEDICARE

## 2021-09-01 PROCEDURE — 90853 GROUP PSYCHOTHERAPY: CPT | Mod: GT | Performed by: SOCIAL WORKER

## 2021-09-01 NOTE — GROUP NOTE
Process Group Note    PATIENT'S NAME: Joel Pineda  MRN:   [id number]  :   1975  ACCT. NUMBER: [id number]  DATE OF SERVICE: 21  START TIME:  1:00 PM  END TIME:  2:50 PM  FACILITATOR: Magali Wayne LICSW  TOPIC:  Process Group    Diagnoses:   296.33 (F33.2) Major Depressive Disorder, Recurrent Episode, Severe.  4. Other Diagnoses that is relevant to services:   300.02 (F41.1) Generalized Anxiety Disorder.      [facility] Mental Health Day Treatment  TRACK: clinic1    NUMBER OF PARTICIPANTS: 6                                    Service Modality:  Video Visit     Telemedicine Visit: The patient's condition can be safely assessed and treated via synchronous audio and visual telemedicine encounter.      Reason for Telemedicine Visit: Services only offered telehealth and covid19    Originating Site (Patient Location): Patient's home    Distant Site (Provider Location): Provider Remote Setting- Home Office    Consent:  The patient/guardian has verbally consented to: the potential risks and benefits of telemedicine (video visit) versus in person care; bill my insurance or make self-payment for services provided; and responsibility for payment of non-covered services.     Patient would like the video invitation sent by:  My Chart    Mode of Communication:  Video Conference via Medical Zoom    As the provider I attest to compliance with applicable laws and regulations related to telemedicine.               Data:    Session content: At the start of this group, patients were invited to check in by identifying themselves, describing their current emotional status, and identifying issues to address in this group.   Area(s) of treatment focus addressed in this session included Symptom Management and Personal Safety.  Tito shared that he is continuing t go through medication changes and that he struggles with hopelessness. He is able to reframe his negative self talk.  Said goodbye to group members  as he transfers from clinic to [facility].    During the second hour the group addressed hope      Therapeutic Interventions/Treatment Strategies:  Psychotherapist offered support, feedback and validation and reinforced use of skills. Treatment modalities used include Cognitive Behavioral Therapy and Dialectical Behavioral Therapy. Interventions include Cognitive Restructuring:  Facilitated recognition of the connection between negative thoughts and negative core beliefs and Other: Discussed ways to increase hopefulness.    Assessment:    Patient response:   Patient responded to session by accepting feedback, giving feedback, listening, focusing on goals, being attentive, accepting support, appearing alert, demonstrating behavior change and verbalizing understanding    Possible barriers to participation / learning include: and no barriers identified    Health Issues:   None reported       Substance Use Review:   Substance Use: No active concerns identified.    Mental Status/Behavioral Observations  Appearance:   Appropriate   Eye Contact:   Good   Psychomotor Behavior: Normal   Attitude:   Cooperative   Orientation:   All  Speech   Rate / Production: Normal    Volume:  Normal   Mood:    Depressed  Normal  Affect:    Appropriate   Thought Content:   Clear and Rumination  Thought Form:  Coherent  Goal Directed  Logical     Insight:    Good     Plan:     Safety Plan: No current safety concerns identified.  Recommended that patient call 911 or go to the local ED should there be a change in any of these risk factors.     Barriers to treatment: None identified    Patient Contracts (see media tab):  None    Substance Use: Not addressed in session     Continue or Discharge: Patient is being discharged today. See Treatment Plan and Discharge Summary.       SCOTT Joseph  September 1, 2021

## 2021-09-01 NOTE — PROGRESS NOTES
Admission Date: 9/1/2021    Identify any current concerns with potential impact to admission:     medication/medical concerns: dropping seroquel, shifting to latude, dropping lamictal     immediate safety concerns:no    Does patient have safety plan? yes  Note: Please copy safety plan copied into BEH Encounter     Other (insurance/childcare/transportation/housing/planned absences/etc): Tuesday 9/14 neurology appt.  9/23 rheumatology appt.      Patient's insurance is:  Medicare, [facility]    Does patient need appointment with provider? yes    Review patient's program schedule and inform them of any variation due to late days or holidays.                                                                                      Completed by: SCOTT Joseph

## 2021-09-02 ENCOUNTER — HOSPITAL ENCOUNTER (OUTPATIENT)
Dept: BEHAVIORAL HEALTH | Facility: CLINIC | Age: 46
End: 2021-09-02
Attending: PSYCHIATRY & NEUROLOGY
Payer: MEDICARE

## 2021-09-02 PROCEDURE — 90853 GROUP PSYCHOTHERAPY: CPT | Mod: PO | Performed by: SOCIAL WORKER

## 2021-09-02 PROCEDURE — 90853 GROUP PSYCHOTHERAPY: CPT | Mod: GT

## 2021-09-02 RX ORDER — RIZATRIPTAN BENZOATE 5 MG/1
TABLET, ORALLY DISINTEGRATING ORAL
Qty: 30 TABLET | Refills: 0 | Status: SHIPPED | OUTPATIENT
Start: 2021-09-02 | End: 2022-02-11

## 2021-09-02 NOTE — GROUP NOTE
Psychotherapy Group Note    PATIENT'S NAME: Joel Pineda  MRN:   [id number]  :   1975  ACCT. NUMBER: [id number]  DATE OF SERVICE: 21  START TIME:  1:00 PM  END TIME:  1:50 PM  FACILITATOR: Lashell Leonard  TOPIC: MH EBP Group: Relationship Skills  [facility] Mental Health Day Treatment  TRACK: 4B                                      Service Modality:  Video Visit     Telemedicine Visit: The patient's condition can be safely assessed and treated via synchronous audio and visual telemedicine encounter.      Reason for Telemedicine Visit:  covid 19    Originating Site (Patient Location): Patient's home    Distant Site (Provider Location): Provider Remote Setting- Home Office    Consent:  The patient/guardian has verbally consented to: the potential risks and benefits of telemedicine (video visit) versus in person care; bill my insurance or make self-payment for services provided; and responsibility for payment of non-covered services.     Patient would like the video invitation sent by:  My Chart    Mode of Communication:  Video Conference via Medical Zoom    As the provider I attest to compliance with applicable laws and regulations related to telemedicine.          NUMBER OF PARTICIPANTS: 8    Summary of Group / Topics Discussed:  Relationship Skills: Conflict Resolution: Topic of conflict resolution in interpersonal communication was discussed. Patients received an overview of how communication skills during times of conflict impact symptoms and functioning. Patients discussed their current communication challenges and how this impacts their functioning. Patients also verbalized understanding of skills learned and practiced in session.     Patient Session Goals / Objectives:    Identified and discussed patient individual challenges with communication    Presented and practiced effective communication skills in session    Assisted patients in implementing more effective communication  skills in their relationships      Patient Participation / Response:  Moderately participated, sharing some personal reflections / insights and adequately adequately received / provided feedback with other participants.    Demonstrated understanding of topics discussed through group discussion and participation and Demonstrated understanding of relationship skills and communication skills    Treatment Plan:  Patient has an initial individualized treatment plan that was created as part of their diagnostic assessment / admission process.  A master individualized treatment plan is in the process of being developed with the patient and multi-disciplinary care team.    Lashell Leonard

## 2021-09-02 NOTE — GROUP NOTE
Psychotherapy Group Note    PATIENT'S NAME: Joel Pineda  MRN:   [id number]  :   1975  ACCT. NUMBER: [id number]  DATE OF SERVICE: 21  START TIME:  3:00 PM  END TIME:  3:50 PM  FACILITATOR: Lashell Leonard  TOPIC: MH EBP Group: Relationship Skills  [facility] Mental Health Day Treatment  TRACK: 4B                                      Service Modality:  Video Visit     Telemedicine Visit: The patient's condition can be safely assessed and treated via synchronous audio and visual telemedicine encounter.      Reason for Telemedicine Visit:  covid 19    Originating Site (Patient Location): Patient's home    Distant Site (Provider Location): Provider Remote Setting- Home Office    Consent:  The patient/guardian has verbally consented to: the potential risks and benefits of telemedicine (video visit) versus in person care; bill my insurance or make self-payment for services provided; and responsibility for payment of non-covered services.     Patient would like the video invitation sent by:  My Chart    Mode of Communication:  Video Conference via Medical Zoom    As the provider I attest to compliance with applicable laws and regulations related to telemedicine.          NUMBER OF PARTICIPANTS: 8    Summary of Group / Topics Discussed:  Relationship Skills: Conflict Resolution: Topic of conflict resolution in interpersonal communication was discussed. Patients received an overview of how communication skills during times of conflict impact symptoms and functioning. Patients discussed their current communication challenges and how this impacts their functioning. Patients also verbalized understanding of skills learned and practiced in session.     Patient Session Goals / Objectives:    Identified and discussed patient individual challenges with communication    Presented and practiced effective communication skills in session    Assisted patients in implementing more effective communication  skills in their relationships      Patient Participation / Response:  Minimally participated, only when prompted / asked.    Demonstrated understanding of topics discussed through group discussion and participation    Treatment Plan:  Patient has an initial individualized treatment plan that was created as part of their diagnostic assessment / admission process.  A master individualized treatment plan is in the process of being developed with the patient and multi-disciplinary care team.    Lashell Leonard

## 2021-09-02 NOTE — GROUP NOTE
Process Group Note    PATIENT'S NAME: Joel Pineda  MRN:   [id number]  :   1975  ACCT. NUMBER: [id number]  DATE OF SERVICE: 21  START TIME:  2:00 PM  END TIME:  2:50 PM  FACILITATOR: Magali Dodson LICSW  TOPIC:  Process Group    Diagnoses:  296.33 (F33.2) Major Depressive Disorder, Recurrent Episode, Severe _.  4. Other Diagnoses that is relevant to services:   300.00 (F41.9) Unspecified Anxiety Disorder  301.83 (F60.3) Borderline Personality Disorder.      [facility] Mental Health Day Treatment  TRACK: 4B    NUMBER OF PARTICIPANTS: 8                                      Service Modality:  Video Visit     Telemedicine Visit: The patient's condition can be safely assessed and treated via synchronous audio and visual telemedicine encounter.      Reason for Telemedicine Visit: Services only offered telehealth    Originating Site (Patient Location): Patient's home    Distant Site (Provider Location): Provider Remote Setting- Home Office    Consent:  The patient/guardian has verbally consented to: the potential risks and benefits of telemedicine (video visit) versus in person care; bill my insurance or make self-payment for services provided; and responsibility for payment of non-covered services.     Patient would like the video invitation sent by:  My Chart    Mode of Communication:  Video Conference via Medical Zoom    As the provider I attest to compliance with applicable laws and regulations related to telemedicine.               Data:    Session content: At the start of this group, patients were invited to check in by identifying themselves, describing their current emotional status, and identifying issues to address in this group.   Area(s) of treatment focus addressed in this session included Symptom Management, Personal Safety and Community Resources/Discharge Planning.  Writer welcomed and oriented client to groups.  Writer reviewed confidentiality, limitations of  "confidentiality, and group guidelines.  Client introduced himself to peers.    Tito stated that his symptoms were higher so the psychiatrist increased the Lamictal.  Tito ended up with the rash as a side effect and had to discontinue the Lamictal.  Then they doubled the Seroquel.  He stated that he is groggy and believes he might be having auditory hallucinations.  Then Latuda was started.  There is no generic version of this medication so with the Medicare as insurance he owes 25% and the medication is $1,800 per month.  He will not reach the \"catastrophic coverage\" until about November so that Medicare covers a higher percentage of the cost.  He may seek EMPATH consultation to see what other options might be available.  He is concerned about the  seeing an unclean apartment.  Group reassured him that cat fur should not be a reason to not seek care.  Client denied suicidal ideation, intent and plan. He denied self-injurious behavior.    Therapeutic Interventions/Treatment Strategies:  Psychotherapist offered support, feedback and validation and reinforced use of skills. Treatment modalities used include Cognitive Behavioral Therapy. Interventions include Coping Skills: Reviewed patients current calming practices and discussed a more formal way of practicing and accessing skills.    Assessment:    Patient response:   Patient responded to session by giving feedback, listening and focusing on goals    Possible barriers to participation / learning include: and no barriers identified    Health Issues:   None reported       Substance Use Review:   Substance Use: No active concerns identified.    Mental Status/Behavioral Observations  Appearance:   Appropriate   Eye Contact:   Good   Psychomotor Behavior: Normal   Attitude:   Cooperative  Friendly  Orientation:   All  Speech   Rate / Production: Normal    Volume:  Normal   Mood:    Anxious  Depressed   Affect:    Appropriate   Thought Content: "   Clear  Thought Form:  Coherent  Logical     Insight:    Good     Plan:     Safety Plan: No current safety concerns identified.  Recommended that patient call 911 or go to the local ED should there be a change in any of these risk factors.     Barriers to treatment: None identified    Patient Contracts (see media tab):  None    Substance Use: Not addressed in session     Continue or Discharge: Patient will continue in Adult Day Treatment (ADT)  as planned. Patient is likely to benefit from learning and using skills as they work toward the goals identified in their treatment plan.      Magali Dodson, [facility]  September 2, 2021

## 2021-09-02 NOTE — PROGRESS NOTES
Adult Day Treatment Program:  Individualized Treatment Plan       Date of Plan: 21    Name: Joel Pineda MRN: [id number]    : 1975     Program: Adult Day Treatment Program (ADT)    Clinical Track: 4B    DSM5 Diagnosis:  296.33 (F33.2) Major Depressive Disorder, Recurrent Episode, Severe.  Other Diagnoses that is relevant to services:   300.02 (F41.1) Generalized Anxiety Disorder.      Adult Day Treatment Team Members:  Dr Raza Leonard MD and/or FIORELLA Ybarra, [facility], ABHIJEET Salas, Sherman Leonard, [facility]  Joel Pineda will participate in the Adult Day Treatment Program 3 days per week, 3 hours per day.   Anticipated duration/discharge: 12 weeks    Due to COVID-19, services will be delivered via telemedicine until further notice.     Program Start Date: 2021  Anticipated Discharge Date: 2021 (pending authorization/clinical changes)    NOTE: Complete CGI     Review Date: Does Joel Pineda continue to meet criteria to participate in the ADT Program, 3 days per week; 3 hours per day?   10/28/21 Yes ABHIJEET Salas     2021 Yes ABHIJEET Salas     2021 Client is discharging upon request ABHIJEET Salas on 2021 at 5:14 PM               Client Strengths:  empathetic and good listener    Client Participation in Plan:  Contributed to goals and plan     Areas of Vulnerability:  Anxiety  Depressive symptoms     Long-Term Goals:  Knowledge about illness and management of symptoms   Maintenance of personal safety     Abuse Prevention Plan:  Safe, therapeutic environment   Education regarding illness and skill development     Discharge Criteria:  Improvement re: identified problems and symptoms   Ability to continue recovery at next level of service      Areas of Treatment Focus        Area of Treatment Focus:   Personal Safety  Start Date:    21    Goal:  Target Date: 10/28/21, 2021 Status: Stopped  Client will notify staff when needing assistance to develop or  implement a coping plan to manage suicidal or self injurious urges.      Progress:   10/28/2021 Patient denied suicidal ideation  11/18/2021 Patient denied suicidal ideation. Client is discharging early to accommodate increased medical appointments.        Treatment Strategies:   Teach adaptive coping skills and communication skills      Area of Treatment Focus:   Symptom Stabilization and Management  Start Date:    9/9/21    Goal:  Target Date: 10/28/21, 11/25/2021 Status: Stopped  Tito reported an increase in symptoms.  He would like to use Day Treatment for additional assistance with practicing coping skills.  Tito would like to work on communication with friends and family to build support.      Progress:   10/28/2021 Client is learning and practicing coping skills, such as self-soothing strategies, mindfulness and assertive communication.  Client is exploring boundary setting in family relationships.  Client expresses interest in increasing socialization by joining recreational groups.  11/18/2021  It is recommended client continues to address these goals in individual therapy and Clinic 1. Client is on the waitlist and will be notified when a spot is available.         Treatment Strategies:   Teach adaptive coping skills and communication skills      Area of Treatment Focus:   Community Resources / Support and Discharge Planning  Start Date:    9/9/21    Goal:  Target Date: 10/28/21, 11/25/2021 Status: Stopped  Will develop an aftercare / transition plan by continuing with individual therapy and medication managment.      Progress:   10/28/2021 Client maintains consistent communication with care team including psychiatrist and individual therapist.  Client has recently consulted with neurosurgery to address proprioception impairment issues.   11/18/2021 It is recommended client continue consistent communication with care team and advocates for his physical and mental health needs.  Client will see his  "individual therapist weekly while on the waiting list for Clinic 1. Program staff recommend participation in recreational groups to increase socialization.          Treatment Strategies:   Teach adaptive coping skills and communication skills       ABHIJEET Salas      NOTE: Required signatures are completed manually and scanned into the electronic medical record. See \"Media\" tab in epic.    The Individualized Treatment Plan Signature Page has been routed to the provider for co-sign.        "

## 2021-09-03 DIAGNOSIS — H10.13 ALLERGIC CONJUNCTIVITIS OF BOTH EYES: ICD-10-CM

## 2021-09-03 RX ORDER — OLOPATADINE HYDROCHLORIDE 2 MG/ML
1 SOLUTION/ DROPS OPHTHALMIC DAILY
Qty: 2.5 ML | Refills: 3 | Status: SHIPPED | OUTPATIENT
Start: 2021-09-03 | End: 2022-01-13

## 2021-09-03 NOTE — PROGRESS NOTES
Refill request for pataday drops received from [facility].  Last appointment 4/2021. Refilled as requested. Farida Galvez RN

## 2021-09-07 ENCOUNTER — HOSPITAL ENCOUNTER (OUTPATIENT)
Dept: BEHAVIORAL HEALTH | Facility: CLINIC | Age: 46
End: 2021-09-07
Attending: PSYCHIATRY & NEUROLOGY
Payer: MEDICARE

## 2021-09-07 PROCEDURE — 90853 GROUP PSYCHOTHERAPY: CPT | Mod: GT | Performed by: SOCIAL WORKER

## 2021-09-07 PROCEDURE — 90853 GROUP PSYCHOTHERAPY: CPT | Mod: PO

## 2021-09-07 PROCEDURE — 90853 GROUP PSYCHOTHERAPY: CPT | Mod: GT

## 2021-09-07 NOTE — GROUP NOTE
Psychotherapy Group Note    PATIENT'S NAME: Joel Pineda  MRN:   [id number]  :   1975  ACCT. NUMBER: [id number]  DATE OF SERVICE: 21  START TIME:  3:00 PM  END TIME:  3:50 PM  FACILITATOR: Faith Ramos LMFT  TOPIC: MH EBP Group: Emotions Management  [facility] Day Treatment  TRACK: 4B    NUMBER OF PARTICIPANTS: 7    Summary of Group / Topics Discussed:  Emotions Management: Anger: Patients explored and shared personal experiences associated with feelings of anger.  Group explored how these feelings develop, what they mean to each individual, and how to increase acceptance and usefulness of these feelings.  Discussed anger as a  secondary  emotion and reviewed ways to manage anger and challenge associated cognitive distortions. Group members worked to contextualize these concepts and promote healing.     Patient Session Goals / Objectives:    Discuss and review definitions and personal views/experiences with anger    Explore how feelings of anger impact functioning    Understand and practice strategies to manage difficult emotions and move towards healing    Demonstrate understanding of the feelings of anger    Verbalize how these emotions have impacted their lives/functioning    Verbalize of knowledge gained and possible interventions to manage feelings                                      Service Modality:  Video Visit     Telemedicine Visit: The patient's condition can be safely assessed and treated via synchronous audio and visual telemedicine encounter.      Reason for Telemedicine Visit: Services only offered telehealth    Originating Site (Patient Location): Patient's home    Distant Site (Provider Location): Provider Remote Setting- Home Office    Consent:  The patient/guardian has verbally consented to: the potential risks and benefits of telemedicine (video visit) versus in person care; bill my insurance or make self-payment for services provided; and  responsibility for payment of non-covered services.     Patient would like the video invitation sent by:  My Chart    Mode of Communication:  Video Conference via Medical Zoom    As the provider I attest to compliance with applicable laws and regulations related to telemedicine.            Patient Participation / Response:  Fully participated with the group by sharing personal reflections / insights and openly received / provided feedback with other participants.    Demonstrated understanding of topics discussed through group discussion and participation, Expressed understanding of the relevance / importance of emotions management skills at distressing times in life and Self-aware of experiences with difficult emotions, and strategies to employ to manage them    Treatment Plan:  Patient has a current master individualized treatment plan.  See Epic treatment plan for more information.    Faith Ramos LMFT

## 2021-09-07 NOTE — GROUP NOTE
Process Group Note    PATIENT'S NAME: Joel Pineda  MRN:   [id number]  :   1975  ACCT. NUMBER: [id number]  DATE OF SERVICE: 21  START TIME:  1:00 PM  END TIME:  1:50 PM  FACILITATOR: Magali Dodson LICSW  TOPIC:  Process Group    Diagnoses:  296.33 (F33.2) Major Depressive Disorder, Recurrent Episode, Severe _.  4. Other Diagnoses that is relevant to services:   300.00 (F41.9) Unspecified Anxiety Disorder  301.83 (F60.3) Borderline Personality Disorder.      [facility] Mental Health Day Treatment  TRACK: 4B    NUMBER OF PARTICIPANTS: 7                                      Service Modality:  Video Visit     Telemedicine Visit: The patient's condition can be safely assessed and treated via synchronous audio and visual telemedicine encounter.      Reason for Telemedicine Visit: Services only offered telehealth    Originating Site (Patient Location): Patient's home    Distant Site (Provider Location): Provider Remote Setting- Home Office    Consent:  The patient/guardian has verbally consented to: the potential risks and benefits of telemedicine (video visit) versus in person care; bill my insurance or make self-payment for services provided; and responsibility for payment of non-covered services.     Patient would like the video invitation sent by:  My Chart    Mode of Communication:  Video Conference via Medical Zoom    As the provider I attest to compliance with applicable laws and regulations related to telemedicine.               Data:    Session content: At the start of this group, patients were invited to check in by identifying themselves, describing their current emotional status, and identifying issues to address in this group.   Area(s) of treatment focus addressed in this session included Symptom Management, Personal Safety and Community Resources/Discharge Planning.  Tito reported stress over being asked to complete a personal and family health history on behalf of  this niece.  He shared that his parents were unwilling to share the information so he was asked to share both family history and some personal health history.  He stated that he had a high level of worry bout completing the task as he did not have all of the information and due to it feeling personal being asked to share his health history.  Client identified coping skills he plans to use to manage the anxiety and worry.  Client denied suicidal ideation, intent and plan.     Therapeutic Interventions/Treatment Strategies:  Psychotherapist offered support, feedback and validation and reinforced use of skills. Treatment modalities used include Cognitive Behavioral Therapy. Interventions include Coping Skills: Assisted patient in understanding the purpose of planning / creating / participating / sharing in positive experiences.    Assessment:    Patient response:   Patient responded to session by focusing on goals and being attentive    Possible barriers to participation / learning include: and no barriers identified    Health Issues:   None reported       Substance Use Review:   Substance Use: No active concerns identified.    Mental Status/Behavioral Observations  Appearance:   Appropriate   Eye Contact:   Good   Psychomotor Behavior: Normal   Attitude:   Cooperative  Friendly  Orientation:   All  Speech   Rate / Production: Normal    Volume:  Normal   Mood:    Anxious  Depressed   Affect:    Appropriate   Thought Content:   Rumination  Thought Form:  Coherent  Logical     Insight:    Good     Plan:     Safety Plan: No current safety concerns identified.  Recommended that patient call 911 or go to the local ED should there be a change in any of these risk factors.     Barriers to treatment: None identified    Patient Contracts (see media tab):  None    Substance Use: Not addressed in session     Continue or Discharge: Patient will continue in Adult Day Treatment (ADT)  as planned. Patient is likely to benefit from  learning and using skills as they work toward the goals identified in their treatment plan.      Magali Dodson, [facility]  September 7, 2021

## 2021-09-07 NOTE — GROUP NOTE
Psychotherapy Group Note    PATIENT'S NAME: Joel Pineda  MRN:   [id number]  :   1975  ACCT. NUMBER: [id number]  DATE OF SERVICE: 21  START TIME:  2:00 PM  END TIME:  2:50 PM  FACILITATOR: Lashell Leonard  TOPIC: MH EBP Group: Emotions Management  [facility] Mental Health Day Treatment  TRACK: 4B                                      Service Modality:  Video Visit     Telemedicine Visit: The patient's condition can be safely assessed and treated via synchronous audio and visual telemedicine encounter.      Reason for Telemedicine Visit:  covid 19     Originating Site (Patient Location): Patient's home    Distant Site (Provider Location): Provider Remote Setting- Home Office    Consent:  The patient/guardian has verbally consented to: the potential risks and benefits of telemedicine (video visit) versus in person care; bill my insurance or make self-payment for services provided; and responsibility for payment of non-covered services.     Patient would like the video invitation sent by:  My Chart    Mode of Communication:  Video Conference via Medical Zoom    As the provider I attest to compliance with applicable laws and regulations related to telemedicine.          NUMBER OF PARTICIPANTS: 7    Summary of Group / Topics Discussed:  Emotions Management: Understanding Emotions: Patients discussed the purpose of emotions and function they serve in our lives.  Reviewed core emotions, why they happen (triggers), and how they occur. The group assisted one anothers' understanding that: emotional experiences are important; difficult emotions have a place in our lives; and the differences between various emotions.    Patient Session Goals / Objectives:    Demonstrate understanding of types various emotions    Identify and discuss specific emotions and when they occur; understand triggers    Discuss barriers to emotional regulation      Patient Participation / Response:  Fully participated with the  group by sharing personal reflections / insights and openly received / provided feedback with other participants.    Demonstrated understanding of topics discussed through group discussion and participation, Expressed understanding of the relevance / importance of emotions management skills at distressing times in life and Self-aware of experiences with difficult emotions, and strategies to employ to manage them    Treatment Plan:  Patient has a current master individualized treatment plan.  See Epic treatment plan for more information.    Lashell Leonard

## 2021-09-08 ENCOUNTER — TELEPHONE (OUTPATIENT)
Dept: BEHAVIORAL HEALTH | Facility: CLINIC | Age: 46
End: 2021-09-08

## 2021-09-08 NOTE — TELEPHONE ENCOUNTER
"RN Review of Medical History / Physical Health Screen  Outpatient Mental Health Programs - [facility] Adult Mental Health Day Treatment    PATIENT'S NAME: Joel Pineda  MRN:   [id number]  :   1975  ACCT. NUMBER: [id number]  CURRENT AGE:  46 year old    DATE OF DIAGNOSTIC ASSESSMENT: 21  DATE OF ADMISSION: 21     Please see Diagnostic Assessment for additional Medical History.     General Health:   Have you had any exposure to any communicable disease in the past 2-3 weeks? no     Are you aware of safe sex practices? yes   Do you have a history of seizures?     If so, do you have a seizure plan? Known triggers?     Notify patient that we will call 911 (if virtual) or a code (if in-person), if we were to witness seizure during group. no    no  no    yes     Nutrition:    Are you on a special diet? If yes, please explain:  yes gastroparesis diet, working with [facility], [facility]   Do you have any concerns regarding your nutritional status? If yes, please explain:  yes not getting the energy that pt feels he needs; may look into med SE   Have you had any appetite changes in the last 3 months?  No     Have you had any weight loss or weight gain in the last 3 months?  Yes, how much? Slight decrease, approx 10 pounds (likely r/t diet change)     Do you have a history of an eating disorder? no   Do you have a history of being in an eating disorder program? no     Patient height and weight recorded by RN in epic flowsheet: no No; Unable to measure  Because of temporary in-person programmatic suspension due to COVID-19 pandemic, all pt weights and heights will be collected through patient self-report an recorded in physical health screening progress note upon admission to the program.                            Height/Weight Review:  Patient reported height:     6'6\"   Patient reports weight:  Date last checked:  320 pounds   Any referrals/needs identified?                BMI Review:  Was " the patient informed of BMI? no      Findings seeabove         Fall Risk:   Have you had any falls in the past 3 months? no     Do you currently use any assistive devices for mobility?   no      Additional Comments/Assessment: Reports some dizziness/off balance r/t meds, some concern about falling - is slower and more purposeful about mvmt, consider s/w Dr about it    Per completion of the Medical History / Physical Health Screen, is there a recommendation to see / follow up with a primary care physician/clinic or dentist?    No.      Sandy Huerta RN  9/8/2021

## 2021-09-09 ENCOUNTER — ANCILLARY PROCEDURE (OUTPATIENT)
Dept: MRI IMAGING | Facility: CLINIC | Age: 46
End: 2021-09-09
Attending: PODIATRIST
Payer: MEDICARE

## 2021-09-09 ENCOUNTER — HOSPITAL ENCOUNTER (OUTPATIENT)
Dept: BEHAVIORAL HEALTH | Facility: CLINIC | Age: 46
End: 2021-09-09
Attending: PSYCHIATRY & NEUROLOGY
Payer: MEDICARE

## 2021-09-09 DIAGNOSIS — M72.2 PLANTAR FASCIITIS: ICD-10-CM

## 2021-09-09 PROCEDURE — 73718 MRI LOWER EXTREMITY W/O DYE: CPT | Mod: LT | Performed by: RADIOLOGY

## 2021-09-09 PROCEDURE — 90853 GROUP PSYCHOTHERAPY: CPT | Mod: GT

## 2021-09-09 PROCEDURE — G1004 CDSM NDSC: HCPCS | Performed by: RADIOLOGY
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
## 2021-09-09 NOTE — GROUP NOTE
Psychotherapy Group Note    PATIENT'S NAME: Joel Pineda  MRN:   [id number]  :   1975  ACCT. NUMBER: [id number]  DATE OF SERVICE: 21  START TIME:  2:00 PM  END TIME:  2:50 PM  FACILITATOR: Lashell Leonard  TOPIC: MH EBP Group: Emotions Management  [facility] Mental Health Day Treatment  TRACK: 4B                                      Service Modality:  Video Visit     Telemedicine Visit: The patient's condition can be safely assessed and treated via synchronous audio and visual telemedicine encounter.      Reason for Telemedicine Visit:  covid 19    Originating Site (Patient Location): Patient's home    Distant Site (Provider Location): Provider Remote Setting- Home Office    Consent:  The patient/guardian has verbally consented to: the potential risks and benefits of telemedicine (video visit) versus in person care; bill my insurance or make self-payment for services provided; and responsibility for payment of non-covered services.     Patient would like the video invitation sent by:  My Chart    Mode of Communication:  Video Conference via Medical Zoom    As the provider I attest to compliance with applicable laws and regulations related to telemedicine.          NUMBER OF PARTICIPANTS: 7    Summary of Group / Topics Discussed:  Emotions Management: Check Facts: Patients participated in an interactive approach to identifying and challenging cognitive distortions that arise following an event that triggers intense emotion.  Patients choose an emotional reaction/event to work on.  The group shared their experiences and thought processes for feedback.      Patient Session Goals / Objectives:    Learn the process of identifying thoughts associated with the situation and reaction    Learn to challenge cognitive distortions and reframe the situation/event/reaction     Distinguish between facts, feelings, thoughts    Gain understanding of how to interpret an emotional reaction    Practice  identification of cognitive distortions and evaluating an emotionally charged situation more rationally/objectively/mindfully      Patient Participation / Response:  Fully participated with the group by sharing personal reflections / insights and openly received / provided feedback with other participants.    Demonstrated understanding of topics discussed through group discussion and participation, Expressed understanding of the relevance / importance of emotions management skills at distressing times in life and Self-aware of experiences with difficult emotions, and strategies to employ to manage them    Treatment Plan:  Patient has a current master individualized treatment plan.  See Epic treatment plan for more information.    Lashell Leonard

## 2021-09-10 DIAGNOSIS — E53.8 B12 DEFICIENCY: ICD-10-CM

## 2021-09-13 ENCOUNTER — HOSPITAL ENCOUNTER (OUTPATIENT)
Dept: BEHAVIORAL HEALTH | Facility: CLINIC | Age: 46
End: 2021-09-13
Attending: PSYCHIATRY & NEUROLOGY
Payer: MEDICARE

## 2021-09-13 PROCEDURE — 90853 GROUP PSYCHOTHERAPY: CPT | Mod: GT

## 2021-09-13 PROCEDURE — 90853 GROUP PSYCHOTHERAPY: CPT | Mod: PO | Performed by: SOCIAL WORKER

## 2021-09-13 PROCEDURE — 90853 GROUP PSYCHOTHERAPY: CPT | Mod: PO

## 2021-09-13 RX ORDER — CYANOCOBALAMIN 1000 UG/ML
INJECTION, SOLUTION INTRAMUSCULAR; SUBCUTANEOUS
Qty: 10 ML | Refills: 0 | Status: SHIPPED | OUTPATIENT
Start: 2021-09-13 | End: 2022-07-17

## 2021-09-13 NOTE — GROUP NOTE
Psychotherapy Group Note    PATIENT'S NAME: Joel Pineda  MRN:   [id number]  :   1975  ACCT. NUMBER: [id number]  DATE OF SERVICE: 21  START TIME:  3:00 PM  END TIME:  3:50 PM  FACILITATOR: Lashell Leonard  TOPIC: MH EBP Group: Emotions Management  [facility] Mental Health Day Treatment  TRACK: 4B                                      Service Modality:  Video Visit     Telemedicine Visit: The patient's condition can be safely assessed and treated via synchronous audio and visual telemedicine encounter.      Reason for Telemedicine Visit:  covid 19     Originating Site (Patient Location): Patient's home    Distant Site (Provider Location): Provider Remote Setting- Home Office    Consent:  The patient/guardian has verbally consented to: the potential risks and benefits of telemedicine (video visit) versus in person care; bill my insurance or make self-payment for services provided; and responsibility for payment of non-covered services.     Patient would like the video invitation sent by:  My Chart    Mode of Communication:  Video Conference via Medical Zoom    As the provider I attest to compliance with applicable laws and regulations related to telemedicine.          NUMBER OF PARTICIPANTS: 6    Summary of Group / Topics Discussed:  Emotions Management: Guilt and Shame: Patients explored and shared personal experiences associated with feelings of guilt and shame.  Group explored how these feelings develop, what they mean to each individual, and how to increase acceptance and usefulness of these feelings.  Group members assisted one another to contextualize these concepts and promote healing.     Patient Session Goals / Objectives:    Discuss and review definitions and personal views/experiences with guilt and shame    Understand the differences between guilt and shame    Explore how feelings of guilt and shame impact functioning    Understand and practice strategies to manage difficult  emotions and move towards healing    Understand and normalize difficult emotions through group discussion    Understand the utility of guilt and shame    Target  unwanted  emotions for change      Patient Participation / Response:  Fully participated with the group by sharing personal reflections / insights and openly received / provided feedback with other participants.    Demonstrated understanding of topics discussed through group discussion and participation, Expressed understanding of the relevance / importance of emotions management skills at distressing times in life and Self-aware of experiences with difficult emotions, and strategies to employ to manage them    Treatment Plan:  Patient has a current master individualized treatment plan.  See Epic treatment plan for more information.    Lashell Leonard

## 2021-09-13 NOTE — TELEPHONE ENCOUNTER
Prescription approved per [facility] Refill Protocol.      Dianne Henderson RN  [facility]

## 2021-09-13 NOTE — GROUP NOTE
Psychotherapy Group Note    PATIENT'S NAME: Joel Pineda  MRN:   [id number]  :   1975  ACCT. NUMBER: [id number]  DATE OF SERVICE: 21  START TIME:  2:00 PM  END TIME:  2:50 PM  FACILITATOR: Magali Dodson LICSW  TOPIC: MH EBP Group: Cognitive Restructuring  [facility] Adult Mental Health Day Treatment  TRACK: 4B    NUMBER OF PARTICIPANTS: 6    Summary of Group / Topics Discussed:  Cognitive Restructuring: Distortions: Patients received an overview of how to identify common cognitive distortions. Patients will explore alternatives to cognitive distortions and practice challenging their negative thought patterns. The goal is to help patients target modify ineffective thought patterns.     Patient Session Goals / Objectives:    Familiarized self with ineffective / unhealthy thoughts and how they develop.      Explored impact of ineffective thoughts / distortions on mood and activity    Formulated new neutral/positive alternatives to challenge less helpful / ineffective thoughts.    Practiced and plan to apply in daily life                                      Service Modality:  Video Visit     Telemedicine Visit: The patient's condition can be safely assessed and treated via synchronous audio and visual telemedicine encounter.      Reason for Telemedicine Visit: Services only offered telehealth    Originating Site (Patient Location): Patient's home    Distant Site (Provider Location): Provider Remote Setting- Home Office    Consent:  The patient/guardian has verbally consented to: the potential risks and benefits of telemedicine (video visit) versus in person care; bill my insurance or make self-payment for services provided; and responsibility for payment of non-covered services.     Patient would like the video invitation sent by:  My Chart    Mode of Communication:  Video Conference via Medical Zoom    As the provider I attest to compliance with applicable laws and regulations related to  telemedicine.                Patient Participation / Response:  Fully participated with the group by sharing personal reflections / insights and openly received / provided feedback with other participants.    Demonstrated knowledge of personal thought patterns and how they impact their mood and behavior.    Treatment Plan:  Patient has a current master individualized treatment plan.  See Epic treatment plan for more information.    Magali Dodson, MEHRANSW
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
## 2021-09-14 ENCOUNTER — HOSPITAL ENCOUNTER (OUTPATIENT)
Dept: BEHAVIORAL HEALTH | Facility: CLINIC | Age: 46
End: 2021-09-14
Attending: PSYCHIATRY & NEUROLOGY
Payer: MEDICARE

## 2021-09-14 PROCEDURE — 90853 GROUP PSYCHOTHERAPY: CPT | Mod: GT

## 2021-09-14 NOTE — GROUP NOTE
Psychotherapy Group Note    PATIENT'S NAME: Joel Pineda  MRN:   [id number]  :   1975  ACCT. NUMBER: [id number]  DATE OF SERVICE: 21  START TIME:  2:00 PM  END TIME:  2:50 PM  FACILITATOR: Lashell Leonard  TOPIC: MH EBP Group: Cognitive Restructuring  [facility] Adult Mental Health Day Treatment  TRACK: 4B                                      Service Modality:  Video Visit     Telemedicine Visit: The patient's condition can be safely assessed and treated via synchronous audio and visual telemedicine encounter.      Reason for Telemedicine Visit:  covid 19     Originating Site (Patient Location): Patient's home    Distant Site (Provider Location): Provider Remote Setting- Home Office    Consent:  The patient/guardian has verbally consented to: the potential risks and benefits of telemedicine (video visit) versus in person care; bill my insurance or make self-payment for services provided; and responsibility for payment of non-covered services.     Patient would like the video invitation sent by:  My Chart    Mode of Communication:  Video Conference via Medical Zoom    As the provider I attest to compliance with applicable laws and regulations related to telemedicine.          NUMBER OF PARTICIPANTS: 4    Summary of Group / Topics Discussed:  Cognitive Restructuring: Core Beliefs: Patients received an overview of what a core belief is, and how they develop. Patients then began to identify their negative core beliefs. Patients worked to modify core beliefs with the goal of improved self-image and functioning.     Patient Session Goals / Objectives:    Familiarize self with the concept of core beliefs and how they develop.      Explore personal core beliefs (positive and negative)    Develop / advance recognition of the connection between negative thoughts and negative core beliefs.    Formulate new neutral/positive core beliefs               Patient Participation / Response:  Fully participated  with the group by sharing personal reflections / insights and openly received / provided feedback with other participants.    Demonstrated understanding of topics discussed through group discussion and participation, Expressed understanding of the relationship between behaviors, thoughts, and feelings and Demonstrated knowledge of personal thought patterns and how they impact their mood and behavior.    Treatment Plan:  Patient has a current master individualized treatment plan.  See Epic treatment plan for more information.    Lashell Leonard

## 2021-09-14 NOTE — GROUP NOTE
Psychotherapy Group Note    PATIENT'S NAME: Joel Pineda  MRN:   [id number]  :   1975  ACCT. NUMBER: [id number]  DATE OF SERVICE: 21  START TIME:  3:00 PM  END TIME:  3:50 PM  FACILITATOR: Lashell Leonard  TOPIC: MH EBP Group: [facility] Adult Mental Health Day Treatment  TRACK: 4B                                      Service Modality:  Video Visit     Telemedicine Visit: The patient's condition can be safely assessed and treated via synchronous audio and visual telemedicine encounter.      Reason for Telemedicine Visit:  covid 19     Originating Site (Patient Location): Patient's home    Distant Site (Provider Location): Provider Remote Setting- Home Office    Consent:  The patient/guardian has verbally consented to: the potential risks and benefits of telemedicine (video visit) versus in person care; bill my insurance or make self-payment for services provided; and responsibility for payment of non-covered services.     Patient would like the video invitation sent by:  My Chart    Mode of Communication:  Video Conference via Medical Zoom    As the provider I attest to compliance with applicable laws and regulations related to telemedicine.          NUMBER OF PARTICIPANTS: 6    Summary of Group / Topics Discussed:  Mindfulness: Mindfulness Experiential: Patients received an overview on what mindfulness is and how mindfulness can benefit general health, mental health symptoms, and stressors. The history of mindfulness, its application to mental health therapies, and key concepts were also discussed. Patients discussed current awareness, knowledge, and practice of mindfulness skills. Patients also discussed barriers to mindfulness practice.    Patient Session Goals / Objectives:    Demonstrated and verbalized understanding of key mindfulness concepts    Identified when/how to use mindfulness skills    Resolved barriers to practicing mindfulness skills    Identified plan to use  mindfulness skills in daily life       Patient Participation / Response:  Fully participated with the group by sharing personal reflections / insights and openly received / provided feedback with other participants.    Demonstrated understanding of topics discussed through group discussion and participation, Demonstrated understanding of mindfulness skills and benefits of practice and Identified / Expressed personal readiness to practice mindfulness skills    Treatment Plan:  Patient has a current master individualized treatment plan.  See Epic treatment plan for more information.    Lashell Leonard

## 2021-09-16 ENCOUNTER — HOSPITAL ENCOUNTER (OUTPATIENT)
Dept: BEHAVIORAL HEALTH | Facility: CLINIC | Age: 46
End: 2021-09-16
Attending: PSYCHIATRY & NEUROLOGY
Payer: MEDICARE

## 2021-09-16 ENCOUNTER — MYC REFILL (OUTPATIENT)
Dept: FAMILY MEDICINE | Facility: CLINIC | Age: 46
End: 2021-09-16

## 2021-09-16 DIAGNOSIS — M45.8 ANKYLOSING SPONDYLITIS OF SACRAL REGION (H): ICD-10-CM

## 2021-09-16 DIAGNOSIS — M51.369 DDD (DEGENERATIVE DISC DISEASE), LUMBAR: ICD-10-CM

## 2021-09-16 DIAGNOSIS — M47.816 LUMBAR FACET ARTHROPATHY: ICD-10-CM

## 2021-09-16 PROCEDURE — 90853 GROUP PSYCHOTHERAPY: CPT | Mod: PO

## 2021-09-16 NOTE — GROUP NOTE
Process Group Note    PATIENT'S NAME: Joel Pineda  MRN:   [id number]  :   1975  ACCT. NUMBER: [id number]  DATE OF SERVICE: 21  START TIME:  2:00 PM  END TIME:  2:50 PM  FACILITATOR: Amy Ferro LGSW; Magali Dodson [facility]  TOPIC:  Process Group    Diagnoses:  296.33 (F33.2) Major Depressive Disorder, Recurrent Episode, Severe.  4. Other Diagnoses that is relevant to services:   300.02 (F41.1) Generalized Anxiety Disorder.       [facility] Adult Mental Health Day Treatment  TRACK: 4B    NUMBER OF PARTICIPANTS: 6                                      Service Modality:  Video Visit     Telemedicine Visit: The patient's condition can be safely assessed and treated via synchronous audio and visual telemedicine encounter.      Reason for Telemedicine Visit: Services only offered telehealth    Originating Site (Patient Location): Patient's home    Distant Site (Provider Location): Provider Remote Setting- Home Office    Consent:  The patient/guardian has verbally consented to: the potential risks and benefits of telemedicine (video visit) versus in person care; bill my insurance or make self-payment for services provided; and responsibility for payment of non-covered services.     Patient would like the video invitation sent by:  My Chart    Mode of Communication:  Video Conference via Medical Zoom    As the provider I attest to compliance with applicable laws and regulations related to telemedicine.           Data:    Session content: At the start of this group, patients were invited to check in by identifying themselves, describing their current emotional status, and identifying issues to address in this group.   Area(s) of treatment focus addressed in this session included Symptom Management, Personal Safety and Community Resources/Discharge Planning.  Client stated that he has had increased energy which he credits to his body adjusting to his recent medication change.  Client also reported that  he visited his mom and step dad and made dinner for them.  Client explained that his parent s  bickering feeds into the apprehension  he has with seeing them and has set the boundary of not participating in day trips with them in the same car. Writer named and commended client for setting this boundary.  Client also reported wanting to find a  happy medium  with his pain medications that he needs to sit through group sessions.  Co-facilitator offered the suggestion of standing up or completing a task while participating in group to avoid the pain caused from sitting down.  Client then commented on his experience of the previous skills group of asking the facilitator to apply the topic to his specific diagnosis of BPD.  Client expressed worry that he was  making it all about him .  Group members were able to validate client s right to ask for what he needs in group.    Therapeutic Interventions/Treatment Strategies:  Psychotherapist offered support, feedback and validation and reinforced use of skills. Treatment modalities used include Cognitive Behavioral Therapy. Interventions include Behavioral Activation: Reinforced benefits/challenges of change process through applying skills to replace unwanted behaviors and Relationship Skills: Encouraged development and maintenance  of healthy boundaries.    Assessment:    Patient response:   Patient responded to session by accepting feedback, giving feedback and listening    Possible barriers to participation / learning include: and no barriers identified    Health Issues:   None reported       Substance Use Review:   Substance Use: No active concerns identified.    Mental Status/Behavioral Observations  Appearance:   Appropriate   Eye Contact:   Good   Psychomotor Behavior: Normal   Attitude:   Cooperative  Interested Friendly  Orientation:   All  Speech   Rate / Production: Normal/ Responsive Normal    Volume:  Normal   Mood:    Normal  Affect:    Appropriate   Thought  Content:   Clear  Thought Form:  Coherent  Logical     Insight:    Good     Plan:     Safety Plan: No current safety concerns identified.  Recommended that patient call 911 or go to the local ED should there be a change in any of these risk factors.     Barriers to treatment: None identified    Patient Contracts (see media tab):  None    Substance Use: Not addressed in session     Continue or Discharge: Patient will continue in Adult Day Treatment (ADT)  as planned. Patient is likely to benefit from learning and using skills as they work toward the goals identified in their treatment plan.      ABHIJEET Salas  September 16, 2021

## 2021-09-16 NOTE — GROUP NOTE
Psychotherapy Group Note    PATIENT'S NAME: Joel Pineda  MRN:   [id number]  :   1975  ACCT. NUMBER: [id number]  DATE OF SERVICE: 21  START TIME:  1:00 PM  END TIME:  1:50 PM  FACILITATOR: Lashell Leonard  TOPIC: MH EBP Group: Cognitive Restructuring  [facility] Adult Mental Health Day Treatment  TRACK: 4B                                      Service Modality:  Video Visit     Telemedicine Visit: The patient's condition can be safely assessed and treated via synchronous audio and visual telemedicine encounter.      Reason for Telemedicine Visit:  covid 19    Originating Site (Patient Location): Patient's home    Distant Site (Provider Location): Provider Remote Setting- Home Office    Consent:  The patient/guardian has verbally consented to: the potential risks and benefits of telemedicine (video visit) versus in person care; bill my insurance or make self-payment for services provided; and responsibility for payment of non-covered services.     Patient would like the video invitation sent by:  My Chart    Mode of Communication:  Video Conference via Medical Zoom    As the provider I attest to compliance with applicable laws and regulations related to telemedicine.          NUMBER OF PARTICIPANTS: 5    Summary of Group / Topics Discussed:  Cognitive Restructuring: Perfectionism: Patients discussed and reflected on what perfectionism is, how it develops, and how it impacts functioning. Ways to challenge perfectionism were explored and discussed by the group, with the goal of challenging perfectionistic thinking and improving functioning.    Patient Session Goals / Objectives:    Understand the concept of perfectionistic thoughts and how they develop.     Increase recognition of the connection between perfectionistic thinking and symptoms.    Explore and practice new ways to challenge the perfectionistic stance and replace with reasonable expectations of self and others.    Begin to formulate  realistic personal expectations and goals.               Patient Participation / Response:  Fully participated with the group by sharing personal reflections / insights and openly received / provided feedback with other participants.    Demonstrated understanding of topics discussed through group discussion and participation, Expressed understanding of the relationship between behaviors, thoughts, and feelings and Demonstrated knowledge of personal thought patterns and how they impact their mood and behavior.    Treatment Plan:  Patient has a current master individualized treatment plan.  See Epic treatment plan for more information.    Lashell Leonard

## 2021-09-16 NOTE — GROUP NOTE
Psychotherapy Group Note    PATIENT'S NAME: Joel Pineda  MRN:   [id number]  :   1975  ACCT. NUMBER: [id number]  DATE OF SERVICE: 21  START TIME:  3:00 PM  END TIME:  3:50 PM  FACILITATOR: Lashell Leonard  TOPIC: MH EBP Group: Behavioral Activation  [facility] Mental Health Day Treatment  TRACK: 4B                                      Service Modality:  Video Visit     Telemedicine Visit: The patient's condition can be safely assessed and treated via synchronous audio and visual telemedicine encounter.      Reason for Telemedicine Visit:  covid 19    Originating Site (Patient Location): Patient's home    Distant Site (Provider Location): Provider Remote Setting- Home Office    Consent:  The patient/guardian has verbally consented to: the potential risks and benefits of telemedicine (video visit) versus in person care; bill my insurance or make self-payment for services provided; and responsibility for payment of non-covered services.     Patient would like the video invitation sent by:  My Chart    Mode of Communication:  Video Conference via Medical Zoom    As the provider I attest to compliance with applicable laws and regulations related to telemedicine.          NUMBER OF PARTICIPANTS: 6    Summary of Group / Topics Discussed:  Behavioral Activation: Motivation and Procrastination: Patients explored how they currently spend their time, identifying thoughts and feelings that are motivating and serve to increase desired behaviors.  They also examined behaviors that contribute to procrastination.  Different types of procrastination behaviors were identified, and strategies to reduce individual procrastination and increase motivation were explored and practiced.  Patients identified ways to increase goal-directed activities to enhance mood and reduce symptoms.        Patient Session Goals / Objectives:    Identify current patterns of procrastination behavior and how they influence  thoughts and moods, and inhibit motivation.    Identify behaviors that can be implemented that contribute to improving thoughts and feelings, motivation, and reduce symptoms.    Identify and develop a plan to increase activities that promote a sense of accomplishment and competence.    Practice scheduling positive activities / behaviors into daily routines.      Patient Participation / Response:  Fully participated with the group by sharing personal reflections / insights and openly received / provided feedback with other participants.    Demonstrated understanding of topics discussed through group discussion and participation, Expressed understanding of the relationship between behaviors, thoughts, and feelings and Shared experiences and challenges with making behavioral changes    Treatment Plan:  Patient has a current master individualized treatment plan.  See Epic treatment plan for more information.    Lashell Leonard

## 2021-09-17 ENCOUNTER — TELEPHONE (OUTPATIENT)
Dept: FAMILY MEDICINE | Facility: CLINIC | Age: 46
End: 2021-09-17

## 2021-09-17 RX ORDER — OXYCODONE HYDROCHLORIDE 5 MG/1
5 TABLET ORAL EVERY 6 HOURS PRN
Qty: 35 TABLET | Refills: 0 | Status: SHIPPED | OUTPATIENT
Start: 2021-09-17 | End: 2021-10-03

## 2021-09-17 NOTE — TELEPHONE ENCOUNTER
Patient calling stating he has had back surgeries in the past and has ankylosing spondylitis of the sacral region.  He's noticed for the past 3 days that when he drives, he cannot find the brake or gas pedals. He feels like he is pushing the brake but then realizes he is not.   Patient not sure if he has a pinched nerve in his spine or if something else is going on and wonders if he should be seen in urgent care or the ER.     This RN advised that patient go to the ER now to be evaluated. Informed patient not to drive and he said he absolutely will not be driving.   Gayle Coello BSN, RN

## 2021-09-17 NOTE — TELEPHONE ENCOUNTER
Routing refill request to provider for review/approval because:  Drug not on the [facility] refill protocol   Keyana Ornelas BSN, RN

## 2021-09-20 ENCOUNTER — TELEPHONE (OUTPATIENT)
Dept: PHARMACY | Facility: CLINIC | Age: 46
End: 2021-09-20

## 2021-09-20 ENCOUNTER — HOSPITAL ENCOUNTER (OUTPATIENT)
Dept: BEHAVIORAL HEALTH | Facility: CLINIC | Age: 46
End: 2021-09-20
Attending: PSYCHIATRY & NEUROLOGY
Payer: MEDICARE

## 2021-09-20 PROCEDURE — 90853 GROUP PSYCHOTHERAPY: CPT | Mod: GT

## 2021-09-20 PROCEDURE — 90853 GROUP PSYCHOTHERAPY: CPT | Mod: PO

## 2021-09-20 NOTE — ADDENDUM NOTE
Encounter addended by: Amy Ferro LGSW on: 9/20/2021 5:07 PM   Actions taken: Clinical Note Signed, Flowsheet accepted

## 2021-09-20 NOTE — GROUP NOTE
Psychotherapy Group Note    PATIENT'S NAME: Joel Pineda  MRN:   [id number]  :   1975  ACCT. NUMBER: [id number]  DATE OF SERVICE: 21  START TIME:  2:00 PM  END TIME:  2:50 PM  FACILITATOR: Amy Ferro LGSW; Magali Dodson LICSW  TOPIC: MH EBP Group: Specialty [facility] Adult Mental Health Day Treatment  TRACK: 4B    NUMBER OF PARTICIPANTS: 5    Summary of Group / Topics Discussed:  Specialty Topics: Life Transitions: The topic of life transitions was presented in order to help patients to better understand the challenges presented by life transitions, and how to best navigate them. Exploring the phases of transition and how one works through them was discussed. Patients were provided with information regarding community resources.     Patient Session Goals / Objectives:    Discussed the timing and nature of major life transitions    Explored how life transitions may impact mental health and functioning    Discussed coping strategies to manage symptoms and help with transitioning    Discussed and planned a successful transition        Patient Participation / Response:  Fully participated with the group by sharing personal reflections / insights and openly received / provided feedback with other participants.    Demonstrated understanding of topics discussed through group discussion and participation and Identified / Expressed readiness to act on skill suggestions discussed in topic    Treatment Plan:  Patient has a current master individualized treatment plan.  See Epic treatment plan for more information.    ABHIJEET Salas

## 2021-09-20 NOTE — GROUP NOTE
Process Group Note    PATIENT'S NAME: Joel Pineda  MRN:   [id number]  :   1975  ACCT. NUMBER: [id number]  DATE OF SERVICE: 21  START TIME:  1:00 PM  END TIME:  1:50 PM  FACILITATOR: Magali Dodson LICSW; Amy Ferro LGSW  TOPIC:  Process Group    Diagnoses:  296.33 (F33.2) Major Depressive Disorder, Recurrent Episode, Severe.  4. Other Diagnoses that is relevant to services:   300.02 (F41.1) Generalized Anxiety Disorder.       [facility] Adult Mental Health Day Treatment  TRACK: 4B    NUMBER OF PARTICIPANTS: 7                                      Service Modality:  Video Visit     Telemedicine Visit: The patient's condition can be safely assessed and treated via synchronous audio and visual telemedicine encounter.      Reason for Telemedicine Visit: Services only offered telehealth    Originating Site (Patient Location): Patient's home    Distant Site (Provider Location): Provider Remote Setting- Home Office    Consent:  The patient/guardian has verbally consented to: the potential risks and benefits of telemedicine (video visit) versus in person care; bill my insurance or make self-payment for services provided; and responsibility for payment of non-covered services.     Patient would like the video invitation sent by:  My Chart    Mode of Communication:  Video Conference via Medical Zoom    As the provider I attest to compliance with applicable laws and regulations related to telemedicine.                Data:    Session content: At the start of this group, patients were invited to check in by identifying themselves, describing their current emotional status, and identifying issues to address in this group.   Area(s) of treatment focus addressed in this session included Symptom Management, Personal Safety and Community Resources/Discharge Planning.  client reported recently having a  curious thing occur  when driving.  Client explained that he has been diagnosed with proprioception  impairment, causing client to not know where the brake pedal was in relation to himself.  Client stated that he at first dismissed this occurrence  because I was tired  but when it happened a second time, client went to the Emergency Department and got neurological scans.  Client reported an attempt to use proximity to the center console as a technique to find the brake, but that this strategy was ineffective.  Client reported consulting with his care team and pharmacy management team and that he will see a neurosurgeon for a consult in two weeks.  Client reported that he will not be driving and will be relying on [employer] for transportation until he can get more answers.  Client reported doing several loads of laundry and scheduling carpet cleaning to lessen his preoccupation with his medical concerns.  Co-facilitator asked the group for feedback on coping with this uncertain time and transportation needs.  Group members offered problem solving, such as purchasing a recumbent bike.  Writer noted client standing during the zoom call and asked if it was helping with pain.  Client stated that he was still figuring out if standing helped with pain levels.      Therapeutic Interventions/Treatment Strategies:  Psychotherapist offered support, feedback and validation and reinforced use of skills. Treatment modalities used include Cognitive Behavioral Therapy. Interventions include Symptoms Management: Promoted understanding of their diagnoses and how it impacts their functioning.    Assessment:    Patient response:   Patient responded to session by accepting feedback, giving feedback and listening    Possible barriers to participation / learning include: and no barriers identified    Health Issues:   Yes: Pain, No Psychological Distress  Neurological Symptoms       Substance Use Review:   Substance Use: No active concerns identified.    Mental Status/Behavioral Observations  Appearance:   Appropriate   Eye Contact:   Good    Psychomotor Behavior: Normal   Attitude:   Cooperative  Interested Friendly  Orientation:   All  Speech   Rate / Production: Normal    Volume:  Normal   Mood:    Normal  Affect:    Appropriate   Thought Content:   Clear  Thought Form:  Coherent  Logical     Insight:    Good     Plan:     Safety Plan: No current safety concerns identified.  Recommended that patient call 911 or go to the local ED should there be a change in any of these risk factors.     Barriers to treatment: None identified    Patient Contracts (see media tab):  None    Substance Use: Not addressed in session     Continue or Discharge: Patient will continue in Adult Day Treatment (ADT)  as planned. Patient is likely to benefit from learning and using skills as they work toward the goals identified in their treatment plan.      ABHIJEET Salas  September 20, 2021

## 2021-09-20 NOTE — TELEPHONE ENCOUNTER
Patient notified to increase dose of glipizide to 10mg with prednisone use. Patient has ample supply at home to increase his dose.    Radha Mcdonald, Pharm.D, [facility]  Medication Therapy Management Pharmacist

## 2021-09-20 NOTE — TELEPHONE ENCOUNTER
"Patient called. Was put on prednisone 20mg twice daily for 5 days in the ED this weekend. He took 20mg of prednisone twice a day on Saturday. Saturday his blood sugar 240's (took 5mg of glipizide), blood sugar when up to 270mg. Noticed \"banging\" in his head with high blood sugar. Sunday morning his blood sugar was back to 120mg/dL fasting. He has not taken any more prednisone since Saturday. Patient is wondering if a prescription for insulin could be utilized to help manage blood sugars while on steroid. If approved he would like this called into [facility]. Patient would like to try prednisone to see if this helps with back pain and symptoms in his feet.    Could consider Humulin NPH 0.4u/kg in the am and adjust dose as needed. Will route to Dr. Lyles for review.    Radha Mcdonald, Pharm.D, BCACP  Medication Therapy Management Pharmacist    "

## 2021-09-20 NOTE — GROUP NOTE
Psychotherapy Group Note    PATIENT'S NAME: Joel Pineda  MRN:   [id number]  :   1975  ACCT. NUMBER: [id number]  DATE OF SERVICE: 21  START TIME:  3:00 PM  END TIME:  3:50 PM  FACILITATOR: Lashell Leonard  TOPIC: MH EBP Group: [facility] Adult Mental Health Day Treatment  TRACK: 4B                                      Service Modality:  Video Visit     Telemedicine Visit: The patient's condition can be safely assessed and treated via synchronous audio and visual telemedicine encounter.      Reason for Telemedicine Visit:  covid 19     Originating Site (Patient Location): Patient's home    Distant Site (Provider Location): Provider Remote Setting- Home Office    Consent:  The patient/guardian has verbally consented to: the potential risks and benefits of telemedicine (video visit) versus in person care; bill my insurance or make self-payment for services provided; and responsibility for payment of non-covered services.     Patient would like the video invitation sent by:  My Chart    Mode of Communication:  Video Conference via Medical Zoom    As the provider I attest to compliance with applicable laws and regulations related to telemedicine.          NUMBER OF PARTICIPANTS: 7    Summary of Group / Topics Discussed:  Mindfulness: What is Mindfulness: Patients received an overview on what mindfulness is and how mindfulness can benefit general health, mental health symptoms, and stressors. The history of mindfulness, its application to mental health therapies, and key concepts were also discussed. Patients discussed current awareness, knowledge, and practice of mindfulness skills. Patients also discussed barriers to mindfulness practice.     Patient Session Goals / Objectives:    Demonstrated understanding of key concepts and application to daily life    Identified when/how to use mindfulness     Resolved barriers to practice    Identified plan to use mindfulness in daily  life      Patient Participation / Response:  Fully participated with the group by sharing personal reflections / insights and openly received / provided feedback with other participants.    Demonstrated understanding of topics discussed through group discussion and participation, Demonstrated understanding of mindfulness skills and benefits of practice and Identified / Expressed personal readiness to practice mindfulness skills    Treatment Plan:  Patient has a current master individualized treatment plan.  See Epic treatment plan for more information.    Lashell Leonard

## 2021-09-20 NOTE — TELEPHONE ENCOUNTER
Actually I think just using the glipize would be a lot easier since he would only need it for very short time.  Prednisone is not an ongoing medication.  So just take 5-10 mg of glipizide daily to compensate.

## 2021-09-21 ENCOUNTER — HOSPITAL ENCOUNTER (OUTPATIENT)
Dept: BEHAVIORAL HEALTH | Facility: CLINIC | Age: 46
End: 2021-09-21
Attending: PSYCHIATRY & NEUROLOGY
Payer: MEDICARE

## 2021-09-21 PROCEDURE — 90853 GROUP PSYCHOTHERAPY: CPT | Mod: PO

## 2021-09-21 NOTE — ADDENDUM NOTE
Encounter addended by: Magali Dodson [facility] on: 9/21/2021 10:57 AM   Actions taken: Clinical Note Signed

## 2021-09-21 NOTE — GROUP NOTE
Psychotherapy Group Note    PATIENT'S NAME: Joel Pineda  MRN:   [id number]  :   1975  ACCT. NUMBER: [id number]  DATE OF SERVICE: 21  START TIME:  3:00 PM  END TIME:  3:50 PM  FACILITATOR: Lashell Leonard  TOPIC: MH EBP Group: Symptom Awareness  [facility] Mental Health Day Treatment  TRACK: 4B                                      Service Modality:  Video Visit     Telemedicine Visit: The patient's condition can be safely assessed and treated via synchronous audio and visual telemedicine encounter.      Reason for Telemedicine Visit:  covid 19     Originating Site (Patient Location): Patient's home    Distant Site (Provider Location): Provider Remote Setting- Home Office    Consent:  The patient/guardian has verbally consented to: the potential risks and benefits of telemedicine (video visit) versus in person care; bill my insurance or make self-payment for services provided; and responsibility for payment of non-covered services.     Patient would like the video invitation sent by:  My Chart    Mode of Communication:  Video Conference via Medical Zoom    As the provider I attest to compliance with applicable laws and regulations related to telemedicine.          NUMBER OF PARTICIPANTS: 7    Summary of Group / Topics Discussed:  Symptom Awareness: Mood Disorders: Patients received a general overview of mood disorders including depressive disorders, anxiety disorders, and bipolar disorders and how it relates to their current symptoms. The purpose is to promote understanding of their diagnoses and how it impacts their functioning. Patients reviewed their current awareness of symptoms and diagnoses. Patients received information regarding diagnoses, etiology, cultural, and environmental factors as well as impact on functioning.     Patient Session Goals / Objectives:    Discussed patient individual symptoms and experiences    Reviewed diagnostic criteria and etiology of diagnoses          Patient Participation / Response:  Fully participated with the group by sharing personal reflections / insights and openly received / provided feedback with other participants.    Demonstrated understanding of topics discussed through group discussion and participation, Demonstrated understanding of how information regarding symptoms can assist in management of symptoms and Identified / Expressed personal readiness to increase awareness of symptoms and apply skills as necessary    Treatment Plan:  Patient has a current master individualized treatment plan.  See Epic treatment plan for more information.    Lashell Leonard

## 2021-09-21 NOTE — GROUP NOTE
Psychotherapy Group Note    PATIENT'S NAME: Joel Pineda  MRN:   [id number]  :   1975  ACCT. NUMBER: [id number]  DATE OF SERVICE: 21  START TIME:  2:00 PM  END TIME:  2:50 PM  FACILITATOR: Lashell Leonard  TOPIC: MH EBP Group: Symptom Awareness  [facility] Mental Health Day Treatment  TRACK: 4B                                      Service Modality:  Video Visit     Telemedicine Visit: The patient's condition can be safely assessed and treated via synchronous audio and visual telemedicine encounter.      Reason for Telemedicine Visit:  covid 19    Originating Site (Patient Location): Patient's home    Distant Site (Provider Location): Provider Remote Setting- Home Office    Consent:  The patient/guardian has verbally consented to: the potential risks and benefits of telemedicine (video visit) versus in person care; bill my insurance or make self-payment for services provided; and responsibility for payment of non-covered services.     Patient would like the video invitation sent by:  My Chart    Mode of Communication:  Video Conference via Medical Zoom    As the provider I attest to compliance with applicable laws and regulations related to telemedicine.          NUMBER OF PARTICIPANTS: 7    Summary of Group / Topics Discussed:  Symptom Awareness: Mood Disorders: Patients received a general overview of mood disorders including depressive disorders, anxiety disorders, and bipolar disorders and how it relates to their current symptoms. The purpose is to promote understanding of their diagnoses and how it impacts their functioning. Patients reviewed their current awareness of symptoms and diagnoses. Patients received information regarding diagnoses, etiology, cultural, and environmental factors as well as impact on functioning.     Patient Session Goals / Objectives:    Discussed patient individual symptoms and experiences    Reviewed diagnostic criteria and etiology of diagnoses          Patient Participation / Response:  Fully participated with the group by sharing personal reflections / insights and openly received / provided feedback with other participants.    Demonstrated understanding of topics discussed through group discussion and participation, Demonstrated understanding of how information regarding symptoms can assist in management of symptoms and Identified / Expressed personal readiness to increase awareness of symptoms and apply skills as necessary    Treatment Plan:  Patient has a current master individualized treatment plan.  See Epic treatment plan for more information.    Lashell Leonard

## 2021-09-21 NOTE — PROGRESS NOTES
Acknowledgement of Current Treatment Plan       I have reviewed my treatment plan with my therapist / counselor on 9/9/21.   I agree with the plan as it is written in the electronic health record. (4B)    Name:      Signature:  Joel Pineda Unable to sign due to COVID 19 pandemic     Dr Raza Leonard MD  Psychiatrist    Magali Dodson, [facility]  Psychotherapist FIORELLA Ybarra, [facility]

## 2021-09-22 NOTE — DISCHARGE SUMMARY
"       Adult Mental Health Intensive Outpatient Discharge Summary/Instructions      Patient: Joel Pineda MRN: [id number]   : 1975 Age: 46 year old Sex: male     Admission Date: 21  Discharge Date: 21  Diagnosis: 296.33 (F33.2) Major Depressive Disorder, Recurrent Episode, Severe _.  4. Other Diagnoses that is relevant to services:   300.00 (F41.9) Unspecified Anxiety Disorder  301.83 (F60.3) Borderline Personality Disorder.      Focus of Treatment / Progress    Personal Safety: Client denied suicidal ideation at time of discharge.     * Follow your safety plan     * Call crisis lines as needed:    [facility] [phone] [facility] [phone]  [facility] [phone] Crisis Connection [phone]  [facility] [phone] [facility] [phone]  [facility] [phone] National Suicide Prevention [phone]  [facility] [phone] Suicide Prevention [phone]  [facility] [phone]    Managing symptoms of:  Depression, Anxiety, Chronic Pain, Social Isolation, Neurological Issues    Community support/health:  Friendships, Family    Managing Symptoms and Preventing Relapse    * Go to all of your appointments    * Take all medications as directed.      * Carry a current list if medication with you    * Do not use illicit (street) drugs.  Avoid alcohol    * Report these symptoms to your care team. These are early signs of relapse:   Thoughts of suicide   Losing more sleep   Increased confusion   Mood getting worse/increased irritability   Feeling more aggressive   Other:  Increased stress related to neurological/other healthcare concerns    *Use these skills daily:  Talk to someone you trust at least one time weekly, set boundaries and say \"no\", be assertive, act opposite of negative feelings, accept challenges with a positive attitude, exercise at least three times per week for 30 minutes,  get enough sleep, eat healthy foods, get into a good " routine    Copy of summary sent to: client via [employer]    Follow up with psychiatrist / main caregiver: [facility]. Dr. De Dios      Next visit: Client will schedule    Follow up with your therapist: [facility]   Next visit: weekly    Go to group therapy and / or support groups at: Tito will be on the waiting list [facility], Wednesdays from 1 pm to 3pm.  Team will contact you with a start date.   Consider [facility] support groups www.namimn.org of the [employer] support groups www.faceit.org    See your medical doctor about:  General care needs.  Tito is currently working with a neurosurgeon on a medical concern.      Other:  Your treatment team appreciates having the opportunity to work with you and wishes you the best.    Client Signature: Unable to sign due to COVID-19 on 11/18/2021 at 5:10 PM  Staff Signature: ABHIJEET Salas on 11/18/2021 at 5:10 PM

## 2021-09-22 NOTE — GROUP NOTE
Process Group Note    PATIENT'S NAME: Joel Pineda  MRN:   [id number]  :   1975  ACCT. NUMBER: [id number]  DATE OF SERVICE: 21  START TIME:  1:00 PM  END TIME:  1:50 PM  FACILITATOR: Magali Dodson LICSW; Amy Ferro LGSW  TOPIC:  Process Group    Diagnoses:  296.33 (F33.2) Major Depressive Disorder, Recurrent Episode, Severe.  4. Other Diagnoses that is relevant to services:   300.02 (F41.1) Generalized Anxiety Disorder.       [facility] Adult Mental Health Day Treatment  TRACK: 4B    NUMBER OF PARTICIPANTS: 7                                      Service Modality:  Video Visit     Telemedicine Visit: The patient's condition can be safely assessed and treated via synchronous audio and visual telemedicine encounter.      Reason for Telemedicine Visit: Services only offered telehealth    Originating Site (Patient Location): Patient's home    Distant Site (Provider Location): Provider Remote Setting- Home Office    Consent:  The patient/guardian has verbally consented to: the potential risks and benefits of telemedicine (video visit) versus in person care; bill my insurance or make self-payment for services provided; and responsibility for payment of non-covered services.     Patient would like the video invitation sent by:  My Chart    Mode of Communication:  Video Conference via Medical Zoom    As the provider I attest to compliance with applicable laws and regulations related to telemedicine.            Data:    Session content: At the start of this group, patients were invited to check in by identifying themselves, describing their current emotional status, and identifying issues to address in this group.   Area(s) of treatment focus addressed in this session included Symptom Management, Personal Safety and Community Resources/Discharge Planning.  Client introduced the group to his two cats before checking in.  Client joined the group 15 minutes late and explained that he was late due to  going to the Optometrist.  Client reported using what he said in group yesterday about his medical concerns to hold himself accountable with not driving.  Client explained that he really wanted to drive today but the pain when he bended over confirmed his decision to not drive and use Uber instead.  Client then asked writer about writer s use of the question  is there anything else you d like to process?  in yesterday s group. Writer  and co-facilitated clarified the intent behind that question as a way to see if client wanted to  dive deeper  into how his medical concerns were making him feel emotionally.  Client thanked writer and co-facilitator for clarifying and acknowledged his tendency to avoid talking about emotions.    Therapeutic Interventions/Treatment Strategies:  Psychotherapist offered support, feedback and validation. Treatment modalities used include Cognitive Behavioral Therapy. Interventions include Emotions Management:  Increased awareness of daily mood patterns/changes.    Assessment:    Patient response:   Patient responded to session by accepting feedback and listening    Possible barriers to participation / learning include: and no barriers identified    Health Issues:   None reported       Substance Use Review:   Substance Use: No active concerns identified.    Mental Status/Behavioral Observations  Appearance:   Appropriate   Eye Contact:   Good   Psychomotor Behavior: Normal   Attitude:   Cooperative  Interested  Orientation:   All  Speech   Rate / Production: Normal    Volume:  Normal   Mood:    Depressed   Affect:    Appropriate   Thought Content:   Clear  Thought Form:  Coherent  Logical     Insight:    Good     Plan:   Safety Plan: No current safety concerns identified.  Recommended that patient call 911 or go to the local ED should there be a change in any of these risk factors.   Barriers to treatment: None identified  Patient Contracts (see media tab):  None  Substance Use: Not  addressed in session   Continue or Discharge: Patient will continue in Adult Day Treatment (ADT)  as planned. Patient is likely to benefit from learning and using skills as they work toward the goals identified in their treatment plan.      ABHIJEET Salas  September 21, 2021

## 2021-09-23 ENCOUNTER — HOSPITAL ENCOUNTER (OUTPATIENT)
Dept: BEHAVIORAL HEALTH | Facility: CLINIC | Age: 46
End: 2021-09-23
Attending: PSYCHIATRY & NEUROLOGY
Payer: MEDICARE

## 2021-09-23 PROCEDURE — 90853 GROUP PSYCHOTHERAPY: CPT | Mod: GT

## 2021-09-23 PROCEDURE — 90853 GROUP PSYCHOTHERAPY: CPT | Mod: PO

## 2021-09-23 NOTE — GROUP NOTE
Process Group Note    PATIENT'S NAME: Joel Pineda  MRN:   [id number]  :   1975  ACCT. NUMBER: [id number]  DATE OF SERVICE: 21  START TIME:  2:00 PM  END TIME:  2:50 PM  FACILITATOR: Amy Ferro LGSW; Magali Dodson [facility]  TOPIC:  Process Group    Diagnoses:  296.33 (F33.2) Major Depressive Disorder, Recurrent Episode, Severe.  4. Other Diagnoses that is relevant to services:   300.02 (F41.1) Generalized Anxiety Disorder.       [facility] Adult Mental Health Day Treatment  TRACK: 4B    NUMBER OF PARTICIPANTS: 8                                      Service Modality:  Video Visit     Telemedicine Visit: The patient's condition can be safely assessed and treated via synchronous audio and visual telemedicine encounter.      Reason for Telemedicine Visit: Services only offered telehealth    Originating Site (Patient Location): Patient's home    Distant Site (Provider Location): Provider Remote Setting- Home Office    Consent:  The patient/guardian has verbally consented to: the potential risks and benefits of telemedicine (video visit) versus in person care; bill my insurance or make self-payment for services provided; and responsibility for payment of non-covered services.     Patient would like the video invitation sent by:  My Chart    Mode of Communication:  Video Conference via Medical Zoom    As the provider I attest to compliance with applicable laws and regulations related to telemedicine.           Data:    Session content: At the start of this group, patients were invited to check in by identifying themselves, describing their current emotional status, and identifying issues to address in this group.   Area(s) of treatment focus addressed in this session included Symptom Management, Personal Safety and Community Resources/Discharge Planning.  Client updated the group that he will be attending a [employer] meeting in which he is the .  Client reported some distress  over the  dysfunctional dynamic  and reminded himself of finding the balance between  doing what is effective versus what is right .  Writer asked client how he was able to find this balance, to which client stated setting a boundary that the  only send him emails when there is a  board packet  for an upcoming meeting.  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  Client gave a brief update on his neurological challenges, stating that he was tapering off of his Dopamine.  When asked about emotional wellbeing in relation to health challenges, client reported he is  able to manage  and  doing okay  and that he  uses his critters to help . Client did not report any suicidal ideation, plan or intent.      Therapeutic Interventions/Treatment Strategies:  Psychotherapist offered support, feedback and validation and reinforced use of skills. Treatment modalities used include Cognitive Behavioral Therapy. Interventions include Relationship Skills: Encouraged development and maintenance  of healthy boundaries.    Assessment:    Patient response:   Patient responded to session by accepting feedback, giving feedback and listening    Possible barriers to participation / learning include: and no barriers identified    Health Issues:   Yes: Neurological Issues, No Psychological Distress       Substance Use Review:   Substance Use: No active concerns identified.    Mental Status/Behavioral Observations  Appearance:   Appropriate   Eye Contact:   Good   Psychomotor Behavior: Normal   Attitude:   Cooperative  Friendly Pleasant  Orientation:   All  Speech   Rate / Production: Normal    Volume:  Normal   Mood:    Normal  Affect:    Appropriate   Thought Content:   Clear  Thought Form:  Coherent  Logical     Insight:    Good     Plan:     Safety Plan: No current safety concerns identified.  Recommended that patient call 911 or go to  the local ED should there be a change in any of these risk factors.     Barriers to treatment: None identified    Patient Contracts (see media tab):  None    Substance Use: Not addressed in session     Continue or Discharge: Patient will continue in Adult Day Treatment (ADT)  as planned. Patient is likely to benefit from learning and using skills as they work toward the goals identified in their treatment plan.      ABHIJEET Salsa  September 23, 2021

## 2021-09-23 NOTE — GROUP NOTE
Psychoeducation Group Note    PATIENT'S NAME: Joel Pineda  MRN:   [id number]  :   1975  ACCT. NUMBER: [id number]  DATE OF SERVICE: 21  START TIME:  3:00 PM  END TIME:  3:50 PM  FACILITATOR: Lashell Leonard  TOPIC: MH RN Group: Health Maintenance  [facility] Mental Health Day Treatment  TRACK: 4B                                      Service Modality:  Video Visit     Telemedicine Visit: The patient's condition can be safely assessed and treated via synchronous audio and visual telemedicine encounter.      Reason for Telemedicine Visit:  covid 19     Originating Site (Patient Location): Patient's home    Distant Site (Provider Location): Provider Remote Setting- Home Office    Consent:  The patient/guardian has verbally consented to: the potential risks and benefits of telemedicine (video visit) versus in person care; bill my insurance or make self-payment for services provided; and responsibility for payment of non-covered services.     Patient would like the video invitation sent by:  My Chart    Mode of Communication:  Video Conference via Medical Zoom    As the provider I attest to compliance with applicable laws and regulations related to telemedicine.          NUMBER OF PARTICIPANTS: 8    Summary of Group / Topics Discussed:  Health Maintenance: Weekend planning: Patients were given time to complete a weekend plan of what they will do to promote wellness and sobriety over the weekend when they do not have the structure of group. Patients were encouraged to review progress on their treatment goals and were challenged to identify ways to work toward meeting them. Patients identified and discussed foreseeable barriers to success over the weekend and then developed a plan to overcome them. Patients reviewed their distress coping skills and social support network and discussed this with the group.       Patient Session Goals / Objectives:    ?    Identified activities to engage in that  promote balance in wellness  ?    Distinguished possible barriers to success over the weekend and created a plan to overcome them  ?    Listed distress coping skills and identified social support network to utilize if in crisis during the weekend          Patient Participation / Response:  Fully participated with the group by sharing personal reflections / insights and openly received / provided feedback with other participants.    Demonstrated understanding of topics discussed through group discussion and participation, Identified / Expressed personal readiness to practice skills and Verbalized understanding of health maintenance topic    Treatment Plan:  Patient has a current master individualized treatment plan.  See Epic treatment plan for more information.    Lashell Leonard

## 2021-09-23 NOTE — GROUP NOTE
Psychotherapy Group Note    PATIENT'S NAME: Joel Pineda  MRN:   [id number]  :   1975  ACCT. NUMBER: [id number]  DATE OF SERVICE: 21  START TIME:  1:00 PM  END TIME:  1:50 PM  FACILITATOR: Lashell Leonard  TOPIC: MH EBP Group: Specialty Awareness  [facility] Adult Mental Health Day Treatment  TRACK: 4B                                      Service Modality:  Video Visit     Telemedicine Visit: The patient's condition can be safely assessed and treated via synchronous audio and visual telemedicine encounter.      Reason for Telemedicine Visit:  covid 19    Originating Site (Patient Location): Patient's home    Distant Site (Provider Location): Provider Remote Setting- Home Office    Consent:  The patient/guardian has verbally consented to: the potential risks and benefits of telemedicine (video visit) versus in person care; bill my insurance or make self-payment for services provided; and responsibility for payment of non-covered services.     Patient would like the video invitation sent by:  My Chart    Mode of Communication:  Video Conference via Medical Zoom    As the provider I attest to compliance with applicable laws and regulations related to telemedicine.          NUMBER OF PARTICIPANTS: 8    Summary of Group / Topics Discussed:  Specialty Topics: Hope: The topic of hope was presented in order to help patients better understand the symptoms of hopelessness and how to become more hopeful. Patients discussed their current awareness of the topic and relevance to their functioning. Individual experiences with symptoms and treatment options were also discussed. Patients explored options for ongoing/future treatment and symptom management.      Patient Session Goals / Objectives:    Discussed definition of hopelessness    Discussed how hopelessness impacts functioning    Set a plan to utilize skills to reduce hopelessness        Patient Participation / Response:  Fully participated with the  group by sharing personal reflections / insights and openly received / provided feedback with other participants.    Demonstrated understanding of topics discussed through group discussion and participation, Identified / Expressed readiness to act on skill suggestions discussed in topic and Verbalized understanding of ways to proactively manage illness    Treatment Plan:  Patient has a current master individualized treatment plan.  See Epic treatment plan for more information.    Lashell Leonard

## 2021-09-24 DIAGNOSIS — E78.1 HYPERTRIGLYCERIDEMIA: ICD-10-CM

## 2021-09-24 DIAGNOSIS — E78.5 HYPERLIPIDEMIA LDL GOAL <70: ICD-10-CM

## 2021-09-24 RX ORDER — OMEGA-3-ACID ETHYL ESTERS 1 G/1
CAPSULE, LIQUID FILLED ORAL
Qty: 360 CAPSULE | Refills: 0 | OUTPATIENT
Start: 2021-09-24

## 2021-09-27 ENCOUNTER — HOSPITAL ENCOUNTER (OUTPATIENT)
Dept: BEHAVIORAL HEALTH | Facility: CLINIC | Age: 46
End: 2021-09-27
Attending: PSYCHIATRY & NEUROLOGY
Payer: MEDICARE

## 2021-09-27 PROCEDURE — 90853 GROUP PSYCHOTHERAPY: CPT | Mod: GT

## 2021-09-27 NOTE — GROUP NOTE
Process Group Note    PATIENT'S NAME: Joel Pineda  MRN:   [id number]  :   1975  ACCT. NUMBER: [id number]  DATE OF SERVICE: 21  START TIME:  1:00 PM  END TIME:  1:50 PM  FACILITATOR: Amy Ferro [facility]; Magali Dodson [facility]  TOPIC:  Process Group    Diagnoses:  296.33 (F33.2) Major Depressive Disorder, Recurrent Episode, Severe.  4. Other Diagnoses that is relevant to services:   300.02 (F41.1) Generalized Anxiety Disorder.       [facility] Adult Mental Health Day Treatment  TRACK: 4B    NUMBER OF PARTICIPANTS: 7          Data:    Session content: At the start of this group, patients were invited to check in by identifying themselves, describing their current emotional status, and identifying issues to address in this group.   Area(s) of treatment focus addressed in this session included Symptom Management, Personal Safety and Community Resources/Discharge Planning.  Client reported feeling anxious after receiving  overwhelming news  on Friday about his current neurological issues.  Client explained that his psychiatrist, who used to be a neurosurgeon, told him that his medication  had nothing to do with  his proprioception issues and that it was a  systemic issue  potentially requiring surgery.  Client reported this news paired with his chronic physical pain causing him to be  horizontal  for most of the day Saturday.  Client also reported falling a few times when standing up from a sitting position.  Client worries about the potential need to cancel a surgery due to his psychiatric concerns.  Co-facilitator attempted to alleviate these concerns by informing him common mental health reasons for delaying surgeries.    Client reported another stressor being his step dad s controlling behavior towards client s mom which he reported talking about in prior groups.  Client explained that due to his mom being in her  upper 70s , his step dad wont let her drive to visit client.  Client reported  looking forward to visiting his mom and disappointment when finding out his step dad was not allowing her to visit.  Writer asked client what coping strategies have helped him deal with his medical and family challenges.  Client reported using distraction and humor by watching television and listening to music.  Client did not report any suicidal ideation, plan or intent.      Therapeutic Interventions/Treatment Strategies:  Psychotherapist offered support, feedback and validation and reinforced use of skills. Treatment modalities used include Cognitive Behavioral Therapy. Interventions include Coping Skills: Assisted patient in identifying 1-2 healthy distraction skills to reduce overall distress and Relationship Skills: Encouraged development and maintenance  of healthy boundaries.    Assessment:    Patient response:   Patient responded to session by accepting feedback, giving feedback and listening    Possible barriers to participation / learning include: and no barriers identified    Health Issues:   Yes: Proprioception Impairment, Associated Psychological Distress       Substance Use Review:   Substance Use: No active concerns identified.    Mental Status/Behavioral Observations  Appearance:   Appropriate   Eye Contact:   Good   Psychomotor Behavior: Normal   Attitude:   Cooperative  Friendly Pleasant  Orientation:   All  Speech   Rate / Production: Normal    Volume:  Normal   Mood:    Anxious   Affect:    Appropriate   Thought Content:   Clear  Thought Form:  Coherent  Logical     Insight:    Good     Plan:     Safety Plan: No current safety concerns identified.  Recommended that patient call 911 or go to the local ED should there be a change in any of these risk factors.     Barriers to treatment: None identified    Patient Contracts (see media tab):  None    Substance Use: Not addressed in session     Continue or Discharge: Patient will continue in Adult Day Treatment (ADT)  as planned. Patient is likely to  benefit from learning and using skills as they work toward the goals identified in their treatment plan.      Amy Ferro, ABHIJEET  September 27, 2021

## 2021-09-27 NOTE — GROUP NOTE
Psychotherapy Group Note    PATIENT'S NAME: Joel Pineda  MRN:   [id number]  :   1975  ACCT. NUMBER: [id number]  DATE OF SERVICE: 21  START TIME:  3:00 PM  END TIME:  3:50 PM  FACILITATOR: Lashell Leonard  TOPIC: MH EBP Group: Coping Skills  [facility] Adult Mental Health Day Treatment  TRACK: 4B    NUMBER OF PARTICIPANTS: 7                                      Service Modality:  Video Visit     Telemedicine Visit: The patient's condition can be safely assessed and treated via synchronous audio and visual telemedicine encounter.      Reason for Telemedicine Visit:  covid 19     Originating Site (Patient Location): Patient's home    Distant Site (Provider Location): Provider Remote Setting- Home Office    Consent:  The patient/guardian has verbally consented to: the potential risks and benefits of telemedicine (video visit) versus in person care; bill my insurance or make self-payment for services provided; and responsibility for payment of non-covered services.     Patient would like the video invitation sent by:  My Chart    Mode of Communication:  Video Conference via Medical Zoom    As the provider I attest to compliance with applicable laws and regulations related to telemedicine.          Summary of Group / Topics Discussed:  Coping Skills: Meditation: Patients learned about meditation and explored how and when to utilize it to increase focus, reduce mental health symptoms, decrease physical tension, and improve mental well-being.  Approaches to meditation were presented as a means of increasing self-awareness. The benefits of various meditation practices were discussed, as well as barriers to utilization of this coping strategy.     Patient Session Goals / Objectives:    Understand the purpose and efficacy of using meditation modalities to reduce stress / symptoms.    Review / discuss situations in daily life that cause distress, where establishing a meditation routine or meditating as  needed may improve functioning.      Verbalize understanding of how and when to apply grounding strategies to reduce distress and increase presence in the moment.    Practice meditation and address barriers to use in daily life.        Patient Participation / Response:  Fully participated with the group by sharing personal reflections / insights and openly received / provided feedback with other participants.    Demonstrated understanding of topics discussed through group discussion and participation, Expressed understanding of the relevance / importance of coping skills at distressing times in life and Demonstrated knowledge of when to consider using a variety of coping skills in daily life    Treatment Plan:  Patient has a current master individualized treatment plan.  See Epic treatment plan for more information.    Lashell Leonard

## 2021-09-27 NOTE — GROUP NOTE
Psychotherapy Group Note    PATIENT'S NAME: Joel Pineda  MRN:   [id number]  :   1975  ACCT. NUMBER: [id number]  DATE OF SERVICE: 21  START TIME:  2:00 PM  END TIME:  2:50 PM  FACILITATOR: Magali Dodson LICSW; Amy Ferro LGSW  TOPIC: MH EBP Group: Coping Skills  [facility] Mental Health Day Treatment  TRACK: 4B    NUMBER OF PARTICIPANTS: 7    Summary of Group / Topics Discussed:  Coping Skills: Self-Soothe: Patients learned to apply self-soothe as a way to decrease heightened stress in the moment.  Patients identified situations that necessitate self-soothe strategies.  They focused on ways to manage physical symptoms of distress using the senses. They discussed how to distinguish when this can be useful in their lives when other strategies are more relevant or helpful.    Patient Session Goals / Objectives:    Understand the purpose of using the senses to decrease distress    Process what happens in the body when using self-soothe strategies    Demonstrate understanding of when to use self-soothe strategies    Identify and problem solve barriers to applying self-soothe strategies.    Choose 1-2 self-soothe strategies to apply during times of distress.        Patient Participation / Response:  Minimally participated, only when prompted / asked.    Demonstrated understanding of topics discussed through group discussion and participation and Demonstrated knowledge of when to consider using a variety of coping skills in daily life    Treatment Plan:  Patient has a current master individualized treatment plan.  See Epic treatment plan for more information.    ABHIJEET Salas

## 2021-09-27 NOTE — PROGRESS NOTES
Patient Active Problem List   Diagnosis     Major depressive disorder, recurrent episode (H)     Intermittent asthma     Hyperlipidemia LDL goal <100     Chronic nonallergic rhinitis     Diverticulosis     GERD (gastroesophageal reflux disease)     Anxiety     LLOYD (obstructive sleep apnea)- mild (AHI 11)     Intracranial arachnoid cyst     Facet arthritis of cervical region     Acquired hypothyroidism     Bipolar 2 disorder (H)     Chronic midline low back pain without sciatica     Irritable bowel syndrome with diarrhea     B12 deficiency     Essential hypertension with goal blood pressure less than 140/90     Chronic, continuous use of opioids     Ankylosing spondylitis of sacral region (H)     Morbid obesity due to hypertriglyceridemia (H)     Fatty infiltration of liver     DDD (degenerative disc disease), lumbar     Type 2 diabetes mellitus with complication, without long-term current use of insulin (H)     Peripheral polyneuropathy     History of pulmonary embolism     Ingrown toenail     Hypertriglyceridemia     Gastroparesis     Orthostatic dizziness     POTS (postural orthostatic tachycardia syndrome)     PHN (postherpetic neuralgia)     Dysautonomia (H)     Major depressive disorder, recurrent episode, severe (H)     Chronic pain syndrome     MDD (major depressive disorder), recurrent severe, without psychosis (H)       Current Outpatient Medications:      acetaminophen 500 MG CAPS, Take 1,000 mg by mouth 2 times daily , Disp: 60 capsule, Rfl:      albuterol (PROAIR HFA/PROVENTIL HFA/VENTOLIN HFA) 108 (90 Base) MCG/ACT inhaler, Inhale 2 puffs into the lungs every 4 hours as needed for shortness of breath / dyspnea or wheezing, Disp: 8.5 g, Rfl: 11     aspirin (ASA) 81 MG tablet, Take 81 mg by mouth daily , Disp: , Rfl:      cetirizine (ZYRTEC) 10 MG tablet, Take 1 tablet (10 mg) by mouth At Bedtime, Disp: 30 tablet, Rfl: 11     cholecalciferol (D3-50) 1250 mcg (74924 units) capsule, TAKE 1 CAPSULE BY MOUTH  EVERY 2 WEEKS, Disp: 24 capsule, Rfl: 0     clonazePAM (KLONOPIN) 0.5 MG tablet, Take 0.5 mg by mouth 2 times daily as needed , Disp: , Rfl:      cyanocobalamin (CYANOCOBALAMIN) 1000 MCG/ML injection, INJECT 1 ML INTO THE MUSCLE EVERY 30 DAYS, Disp: 10 mL, Rfl: 0     dronabinol (MARINOL) 5 MG capsule, Take 1 capsule by mouth 2 times daily, Disp: , Rfl:      DULoxetine (CYMBALTA) 60 MG capsule, Take 120 mg by mouth daily , Disp: , Rfl:      EPINEPHrine (ANY BX GENERIC EQUIV) 0.3 MG/0.3ML injection 2-pack, Inject 0.3 mLs (0.3 mg) into the muscle once as needed for anaphylaxis, Disp: 0.6 mL, Rfl: 3     eszopiclone (LUNESTA) 2 MG tablet, Take 2 mg by mouth At Bedtime, Disp: , Rfl:      etanercept (ENBREL SURECLICK) 50 MG/ML autoinjector, Inject 50 mg Subcutaneous once a week . Hold for signs of infection, and seek medical attention., Disp: 4 mL, Rfl: 12     famotidine (PEPCID) 20 MG tablet, Prior to administration of Humira every 2 weeks (Patient taking differently: Take 20 mg by mouth every 7 days Prior to Enbrel Injections to prevent skin reaction), Disp: , Rfl:      fluticasone (FLONASE) 50 MCG/ACT nasal spray, Spray 1 spray into both nostrils daily, Disp: , Rfl:      fluticasone-salmeterol (ADVAIR) 250-50 MCG/DOSE inhaler, Inhale 1 puff into the lungs every 12 hours, Disp: 14 each, Rfl: 11     levothyroxine (SYNTHROID/LEVOTHROID) 75 MCG tablet, TAKE 1 TABLET EVERY MORNING, Disp: 90 tablet, Rfl: 3     lidocaine (XYLOCAINE) 5 % external ointment, Apply topically 4 times daily as needed (pain), Disp: 50 g, Rfl: 2     melatonin 3 MG tablet, Take 12 mg by mouth nightly as needed , Disp: , Rfl:      methocarbamol (ROBAXIN) 500 MG tablet, Take 1-2 tablets (500-1,000 mg) by mouth 4 times daily as needed for muscle spasms, Disp: 240 tablet, Rfl: 1     metoclopramide (REGLAN) 5 MG tablet, Take 5 mg by mouth 2 times daily, Disp: , Rfl:      metoprolol succinate ER (TOPROL-XL) 200 MG 24 hr tablet, TAKE 1 TABLET BY MOUTH TWICE  DAILY , Disp: 180 tablet, Rfl: 0     montelukast (SINGULAIR) 10 MG tablet, Take 1 tablet (10 mg) by mouth every evening, Disp: 90 tablet, Rfl: 3     nabumetone (RELAFEN) 500 MG tablet, Take 1-2 tablets (500-1,000 mg) by mouth 2 times daily as needed for moderate pain (with food), Disp: 120 tablet, Rfl: 2     naloxone (NARCAN) 4 MG/0.1ML nasal spray, Spray 1 spray (4 mg) into one nostril alternating nostrils as needed for opioid reversal every 2-3 minutes until assistance arrives, Disp: 0.2 mL, Rfl: 0     olopatadine (PATADAY) 0.2 % ophthalmic solution, Place 1 drop into both eyes daily, Disp: 2.5 mL, Rfl: 3     omega-3 acid ethyl esters (LOVAZA) 1 g capsule, TAKE TWO CAPSULES BY MOUTH TWICE DAILY, Disp: 360 capsule, Rfl: 0     omeprazole 20 MG tablet, Take 20 mg by mouth daily , Disp: , Rfl:      ondansetron (ZOFRAN-ODT) 8 MG ODT tab, Take 8 mg by mouth every 8 hours as needed for nausea, Disp: , Rfl:      order for DME, Equipment being ordered: Assure Compression stockings (ANNETTA) Large, closed toe, thigh-high 30-40 compression, Disp: 2 Units, Rfl: 3     order for DME, Equipment being ordered: lumbosacral belt/brace, Disp: 1 Units, Rfl: 0     order for DME, Respironics REMSTAR 60 Series Auto CPAP 9-13 cm H2O, Wisp nasal mask w/a large cushion and a chinstrap, Disp: , Rfl:      oxyCODONE (ROXICODONE) 5 MG tablet, Take 1 tablet (5 mg) by mouth every 6 hours as needed for pain (maximum 6 tablet(s) per day) . Acute on chronic pain, Disp: 35 tablet, Rfl: 0     pregabalin (LYRICA) 150 MG capsule, Take 150 mg by mouth 2 times daily, Disp: , Rfl:      pyridostigmine (MESTINON) 60 MG tablet, Take 1 tablet by mouth 5 times daily, Disp: , Rfl:      ramipril (ALTACE) 10 MG capsule, Take 1 capsule (10 mg) by mouth daily, Disp: 90 capsule, Rfl: 1     rizatriptan (MAXALT-MLT) 5 MG ODT, DISSOLVE 1 TABLET BY MOUTH AT ONSET OF HEADACHE, Disp: 30 tablet, Rfl: 0     rosuvastatin (CRESTOR) 40 MG tablet, Take 1 tablet (40 mg) by mouth  "daily, Disp: 90 tablet, Rfl: 2     syringe, disposable, (BD TUBERCULIN SYRINGE) 1 ML MISC, Equipment being ordered: 1 ml tuberculin syringes to be used for Vitamin B12 injections., Disp: 12 each, Rfl: 11     syringe/needle, disp, (BD INTEGRA SYRINGE) 25G X 1\" 3 ML MISC, USE FOR VITAMIN B12 INJECTIONS, Disp: 12 each, Rfl: 0     vitamin B complex with vitamin C (STRESS TAB) tablet, Take 1 tablet by mouth daily, Disp: , Rfl:      ZINC SULFATE-VITAMIN C MT, Take 1 tablet by mouth daily, Disp: , Rfl:   Psychiatry staffing: case discussed  Diagnosis:  As above;  Dealing with stress and mood, using group well  "

## 2021-09-28 ENCOUNTER — HOSPITAL ENCOUNTER (OUTPATIENT)
Dept: BEHAVIORAL HEALTH | Facility: CLINIC | Age: 46
End: 2021-09-28
Attending: PSYCHIATRY & NEUROLOGY
Payer: MEDICARE

## 2021-09-28 PROCEDURE — 90853 GROUP PSYCHOTHERAPY: CPT | Mod: GT
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
## 2021-09-28 NOTE — GROUP NOTE
Psychotherapy Group Note    PATIENT'S NAME: Joel Pineda  MRN:   [id number]  :   1975  ACCT. NUMBER: [id number]  DATE OF SERVICE: 21  START TIME:  3:00 PM  END TIME:  3:50 PM  FACILITATOR: Lashell Leonard  TOPIC: MH EBP Group: Coping Skills  [facility] Adult Mental Health Day Treatment  TRACK: 4B                                      Service Modality:  Video Visit     Telemedicine Visit: The patient's condition can be safely assessed and treated via synchronous audio and visual telemedicine encounter.      Reason for Telemedicine Visit:  covid 19     Originating Site (Patient Location): Patient's home    Distant Site (Provider Location): Provider Remote Setting- Home Office    Consent:  The patient/guardian has verbally consented to: the potential risks and benefits of telemedicine (video visit) versus in person care; bill my insurance or make self-payment for services provided; and responsibility for payment of non-covered services.     Patient would like the video invitation sent by:  My Chart    Mode of Communication:  Video Conference via Medical Zoom    As the provider I attest to compliance with applicable laws and regulations related to telemedicine.          NUMBER OF PARTICIPANTS: 8    Summary of Group / Topics Discussed:  Coping Skills: Distraction: Patients learned to mindfully use distraction as a way to decrease heightened stress in the moment.  Patients will identified situations that necessitate healthy distraction strategies.  They explored ways to manage physical symptoms of distress using distraction. The group began to distinguish when this can be useful in their lives or when other strategies would be more relevant or helpful.    Patient Session Goals / Objectives:    Understand the purpose and benefits of using healthy distraction to decrease distress.    Process what happens in the body when using distraction strategies.    Demonstrate understanding of when to use  distraction strategies.    Explore patient s current distraction activities, and how to take a more intentional approach to the use of distraction.    Identify and problem solve barriers to applying distraction strategies.    Choose 1-2 healthy distraction strategies to apply during times of distress.        Patient Participation / Response:  Fully participated with the group by sharing personal reflections / insights and openly received / provided feedback with other participants.    Demonstrated understanding of topics discussed through group discussion and participation, Expressed understanding of the relevance / importance of coping skills at distressing times in life and Demonstrated knowledge of when to consider using a variety of coping skills in daily life    Treatment Plan:  Patient has a current master individualized treatment plan.  See Epic treatment plan for more information.    Lashell Leonard

## 2021-09-28 NOTE — GROUP NOTE
Psychotherapy Group Note    PATIENT'S NAME: Joel Pineda  MRN:   [id number]  :   1975  ACCT. NUMBER: [id number]  DATE OF SERVICE: 21  START TIME:  2:00 PM  END TIME:  2:50 PM  FACILITATOR: Lashell Leonard  TOPIC: MH EBP Group: Coping Skills  [facility] Adult Mental Health Day Treatment  TRACK: 4B                                      Service Modality:  Video Visit     Telemedicine Visit: The patient's condition can be safely assessed and treated via synchronous audio and visual telemedicine encounter.      Reason for Telemedicine Visit:  covid 19     Originating Site (Patient Location): Patient's home    Distant Site (Provider Location): Provider Remote Setting- Home Office    Consent:  The patient/guardian has verbally consented to: the potential risks and benefits of telemedicine (video visit) versus in person care; bill my insurance or make self-payment for services provided; and responsibility for payment of non-covered services.     Patient would like the video invitation sent by:  My Chart    Mode of Communication:  Video Conference via Medical Zoom    As the provider I attest to compliance with applicable laws and regulations related to telemedicine.          NUMBER OF PARTICIPANTS: 8    Summary of Group / Topics Discussed:  Coping Skills: Distraction: Patients learned to mindfully use distraction as a way to decrease heightened stress in the moment.  Patients will identified situations that necessitate healthy distraction strategies.  They explored ways to manage physical symptoms of distress using distraction. The group began to distinguish when this can be useful in their lives or when other strategies would be more relevant or helpful.    Patient Session Goals / Objectives:    Understand the purpose and benefits of using healthy distraction to decrease distress.    Process what happens in the body when using distraction strategies.    Demonstrate understanding of when to use  distraction strategies.    Explore patient s current distraction activities, and how to take a more intentional approach to the use of distraction.    Identify and problem solve barriers to applying distraction strategies.    Choose 1-2 healthy distraction strategies to apply during times of distress.        Patient Participation / Response:  Fully participated with the group by sharing personal reflections / insights and openly received / provided feedback with other participants.    Demonstrated understanding of topics discussed through group discussion and participation, Expressed understanding of the relevance / importance of coping skills at distressing times in life and Demonstrated knowledge of when to consider using a variety of coping skills in daily life    Treatment Plan:  Patient has a current master individualized treatment plan.  See Epic treatment plan for more information.    Lashell Leonard

## 2021-09-29 ENCOUNTER — TELEPHONE (OUTPATIENT)
Dept: NEUROSURGERY | Facility: CLINIC | Age: 46
End: 2021-09-29

## 2021-09-29 ENCOUNTER — HOSPITAL ENCOUNTER (OUTPATIENT)
Dept: BEHAVIORAL HEALTH | Facility: CLINIC | Age: 46
End: 2021-09-29
Attending: PSYCHIATRY & NEUROLOGY
Payer: MEDICARE

## 2021-09-29 PROCEDURE — 99214 OFFICE O/P EST MOD 30 MIN: CPT | Mod: 95 | Performed by: PSYCHIATRY & NEUROLOGY

## 2021-09-29 RX ORDER — LURASIDONE HYDROCHLORIDE 40 MG/1
60 TABLET, FILM COATED ORAL DAILY
COMMUNITY
Start: 2021-09-27 | End: 2022-02-15

## 2021-09-29 NOTE — PROGRESS NOTES
"[facility] Mental Health Outpatient Programs  Provider Interval History Note    Program: Adult Day Treatment    PATIENT'S NAME: Joel Pineda  MRN:   [id number]  :   1975  ACCT. NUMBER: [id number]  DATE OF SERVICE: 21  CALL/VIDEO START TIME: 1401  CALL/VIDEO END TIME: 1423      Interval History:  \"Things are looking up.\" Tito presents for follow-up today.  Had a slight delay refilling lurasidone with some resulting increase in anxiety.  Is now back on the medication and feeling more steady.    Continues to feel group is helpful, seeing improvement in symptoms.  Has lost some weight, which is helpful both for self-esteem and back pain.    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  Is no longer driving as a result.  Has upcoming consultation with 2 neurosurgeons.    Feels his work in group therapy is helping him stabilize his moods.  Expressed some pride and having stayed out of the hospital for the last 4 years.  Denies current suicidal ideation.  Denies any self injury for a year or more.  Denies homicidal ideation.  Does not endorse psychotic symptoms today.    Medications:  Current Outpatient Medications   Medication     acetaminophen 500 MG CAPS     albuterol (PROAIR HFA/PROVENTIL HFA/VENTOLIN HFA) 108 (90 Base) MCG/ACT inhaler     aspirin (ASA) 81 MG tablet     cetirizine (ZYRTEC) 10 MG tablet     cholecalciferol (D3-50) 1250 mcg (73893 units) capsule     clonazePAM (KLONOPIN) 0.5 MG tablet     cyanocobalamin (CYANOCOBALAMIN) 1000 MCG/ML injection     dronabinol (MARINOL) 5 MG capsule     DULoxetine (CYMBALTA) 60 MG capsule     EPINEPHrine (ANY BX GENERIC EQUIV) 0.3 MG/0.3ML injection 2-pack     eszopiclone (LUNESTA) 3 MG tablet     etanercept (ENBREL SURECLICK) 50 MG/ML autoinjector     famotidine (PEPCID) 20 MG tablet     fluticasone " "(FLONASE) 50 MCG/ACT nasal spray     fluticasone-salmeterol (ADVAIR) 250-50 MCG/DOSE inhaler     LATUDA 40 MG TABS tablet     levothyroxine (SYNTHROID/LEVOTHROID) 75 MCG tablet     lidocaine (XYLOCAINE) 5 % external ointment     melatonin 3 MG tablet     methocarbamol (ROBAXIN) 500 MG tablet     metoclopramide (REGLAN) 5 MG tablet     metoprolol succinate ER (TOPROL-XL) 200 MG 24 hr tablet     montelukast (SINGULAIR) 10 MG tablet     nabumetone (RELAFEN) 500 MG tablet     naloxone (NARCAN) 4 MG/0.1ML nasal spray     olopatadine (PATADAY) 0.2 % ophthalmic solution     omega-3 acid ethyl esters (LOVAZA) 1 g capsule     omeprazole 20 MG tablet     ondansetron (ZOFRAN-ODT) 8 MG ODT tab     oxyCODONE (ROXICODONE) 5 MG tablet     pregabalin (LYRICA) 150 MG capsule     pyridostigmine (MESTINON) 60 MG tablet     ramipril (ALTACE) 10 MG capsule     rizatriptan (MAXALT-MLT) 5 MG ODT     rosuvastatin (CRESTOR) 40 MG tablet     vitamin B complex with vitamin C (STRESS TAB) tablet     ZINC SULFATE-VITAMIN C MT     order for DME     order for DME     order for DME     syringe, disposable, (BD TUBERCULIN SYRINGE) 1 ML MISC     syringe/needle, disp, (BD INTEGRA SYRINGE) 25G X 1\" 3 ML MISC     No current facility-administered medications for this encounter.       Reviewed, patient is taking medications as prescribed.    Allergies:     Allergies   Allergen Reactions     Amoxicillin-Pot Clavulanate Difficulty breathing     Banana Shortness Of Breath     Pt reports organic Banana is okay.      Nitroglycerin Palpitations     Penicillins Anaphylaxis     Provigil [Modafinil] Shortness Of Breath     headache     Gadolinium Hives and Itching     Patient was premedicated for the contrast allergy. He did still have a reaction a few hours after injection. Hives and itching. Dr. Gomez told tech to inform pt he should only have contrast again in the future when premedicated and at a hospital. Not at an outpatient facility.      " Ketoconazole      Topical cream caused swelling and itching     Dye [Contrast Dye] Other (See Comments) and Hives     Moderate flushing, CT contrast     Golimumab      Hives, bradycardia, face swelling     Neurontin [Gabapentin] Hives     Moderate hives     Nortriptyline Hives     Varicella Virus Vaccine Live      Rash     Flagyl [Metronidazole Hcl] Palpitations and Hives     Latex Rash     Metronidazole Palpitations, Other (See Comments) and Rash     dizziness (versus ciprofloxacin taken at same time)       Laboratory Results:  Lab on 08/19/2021   Component Date Value Ref Range Status     Hemoglobin A1C 08/19/2021 6.0* 0.0 - 5.6 % Final    Normal <5.7%   Prediabetes 5.7-6.4%    Diabetes 6.5% or higher     Note: Adopted from ADA consensus guidelines.       Metrics:  PHQ-9 scores:   PHQ-9 SCORE 7/8/2021 7/19/2021 7/20/2021 9/1/2021   PHQ-9 Total Score - - - -   PHQ-9 Total Score MyChart 13 (Moderate depression) 15 (Moderately severe depression) 12 (Moderate depression) 16 (Moderately severe depression)   PHQ-9 Total Score 13 15 12 16   PHQ-9 Total Score - - - -     YUDI-7 scores:   YUDI-7 SCORE 7/19/2021 7/20/2021 9/1/2021   Total Score - - -   Total Score 9 (mild anxiety) 9 (mild anxiety) 14 (moderate anxiety)   Total Score 9 9 14   Total Score BEH Adult - - -       Mental Status Examination (limited due to video/virtual visit format):  Vital Signs: There were no vitals taken for this visit.  Appearance: adequately groomed, appears stated age, and in no apparent distress..  Attitude: cooperative   Eye Contact: Fair to the extent that can be discerned in a video visit  Muscle Strength and Tone: no gross abnormalities based on remote observation  Psychomotor Behavior:  no evidence of tardive dyskinesia, dystonia, or tics  Gait and Station: normal, no gross abnormalities based on remote observation  Speech: clear, coherent, normal prosody, regular rate, regular rhythm and fluent  Associations: No loosening of  "associations  Thought Process: coherent and goal directed  Thought Content: no evidence of suicidal ideation or homicidal ideation, no evidence of psychotic thought, no auditory hallucinations present and no visual hallucinations present  Mood: \"better\"  Affect: mood congruent, intensity is blunted, constricted mobility, restricted range and reactive  Insight: good  Judgment: intact, adequate for safety  Impulse Control: intact  Oriented to: time, place, person and situation  Attention Span and Concentration: normal  Language: intact  Recent and Remote Memory: intact to interview. Not formally assessed. No amnesia.  Fund of Knowledge: appropriate    Risk Status:  Risk status (self/other harm or suicidal ideation)  Patient denies suicidal ideation, has history of presenting voluntarily for hospitalization when it is severe, and endorses that treatment has helped keep suicidal thinking at bay even in the presence of other suicidal group members in recent weeks. I feel this patient does not meet criteria for an involuntary hold and is appropriate for treatment at an outpatient level of care.    Diagnosis/es:  296.89 Bipolar II Disorder Depressed and moderate  300.02 (F41.1) Generalized Anxiety Disorder  301.83 (F60.3) Borderline Personality Disorder per chart      Assessment:  Tito presents today for follow up.  He is seeing improvement in symptoms with group therapy and recent changes to medication.  Unfortunately, had a systemic reaction to lamotrigine but is seeing stability with lurasidone.      Bipolar II: Feels symptoms are better controlled at the new dose (40 mg) than they were at 20 mg.  Discussed that there is room to increase the dose in the future if necessary, but if he is seeing improvement at 40 mg then he could continue to observe at this dose for now.      Generalized anxiety disorder: taking duloxetine as noted in Epic, stable with current treatment, symptoms improving with therapy.    Borderline " personality disorder: seeing stability and good coping skills with group.    Tito plans to continue to review his medications with his outpatient psychiatrist.  I will defer to that provider for now and remain available for consultation and second opinion.    Endorses chronic suicidal ideation but none today.  No safety concerns at this time.      Treatment Plan:    Continue current medications as prescribed.    Continue therapy as planned - Adult Day Treatment.    Continue all other medications as reviewed per electronic medical record today.     Safety plan reviewed. To the Emergency Department as needed or call after hours crisis line at [phone] or [phone]. [place] Crisis Text Line: Text [place] to 563057  or  Suicide LifeLine Chat: suicidepreventionLone [email or web address] Electricline.org/chat    Schedule an appointment with me or another program provider in approximately 4 weeks or sooner if needed.  Call [facility] at [phone] to schedule.    Follow up with outpatient provider as planned or sooner if needed for acute medical concerns.    Call the psychiatric nurse line with medication questions or concerns at [phone].    Uguru may be used to communicate with your provider, but this is not intended to be used for emergencies.    Treatment Objective(s) Addressed in This Session:  The purpose of today's call is for this writer to provide oversight of patient's care while receiving program services. Specific treatment goals addressed included personal safety, symptoms stabilization and management, wellness and mental health, and community resources/discharge planning.     This author or another program provider will follow up with the patient as noted above.     Patient continues to meet criteria for recommended level of care: Adult Day Treatment  Patient would be at reasonable risk of requiring a higher level of care in the absence of current services.    Patient agrees with the current plan of  care.    Joel Tan MD  9/29/21      Visit Details:  Type of service:  Video Visit    Video Start/End Time: see above    Originating Location (pt. Location): Home in [place]    Distant Location (provider location): Provider Remote Setting- Home Office    Platform used for Video Visit: Sophia    Physician has received verbal consent for a Video Visit from the patient? Yes    35 minutes spent on the date of the encounter doing chart review, patient visit and documentation     This document completed in part using Dragon Medical One dictation software.  Please excuse any inadvertent word or phrase substitutions.

## 2021-09-29 NOTE — TELEPHONE ENCOUNTER
[facility] Call Center    Phone Message    May a detailed message be left on voicemail: yes     Reason for Call: Other: Patient is wondering if he can set up a virtual visit with Dr. Emily Obando to discuss the results of imaging he had done when he was recently seen in the ED at [facility] on 9/17.  He has an appointment with a provider outside [facility] next week but stated he highly values Dr. Obando' opinion and would like to see her if possible. Please call patient back at your earliest convenience.      Action Taken: Message routed to:  [facility] neurosurgery    Travel Screening: Not Applicable

## 2021-09-30 ENCOUNTER — HOSPITAL ENCOUNTER (OUTPATIENT)
Dept: BEHAVIORAL HEALTH | Facility: CLINIC | Age: 46
End: 2021-09-30
Attending: PSYCHIATRY & NEUROLOGY
Payer: MEDICARE

## 2021-09-30 ENCOUNTER — TELEPHONE (OUTPATIENT)
Dept: NEUROSURGERY | Facility: CLINIC | Age: 46
End: 2021-09-30

## 2021-09-30 PROCEDURE — 90853 GROUP PSYCHOTHERAPY: CPT | Mod: GT

## 2021-09-30 PROCEDURE — 90853 GROUP PSYCHOTHERAPY: CPT | Mod: PO

## 2021-09-30 PROCEDURE — 90853 GROUP PSYCHOTHERAPY: CPT | Mod: GT | Performed by: COUNSELOR

## 2021-09-30 ASSESSMENT — ENCOUNTER SYMPTOMS
DECREASED CONCENTRATION: 1
PARALYSIS: 0
DIZZINESS: 1
RECTAL PAIN: 0
ABDOMINAL PAIN: 1
NAUSEA: 1
VOMITING: 0
MUSCLE WEAKNESS: 1
HEARTBURN: 1
JOINT SWELLING: 0
DIARRHEA: 0
INSOMNIA: 1
DEPRESSION: 1
TINGLING: 0
LOSS OF CONSCIOUSNESS: 0
TREMORS: 0
PANIC: 0
SPEECH CHANGE: 0
STIFFNESS: 1
WEAKNESS: 1
HEADACHES: 1
JAUNDICE: 0
MUSCLE CRAMPS: 1
BOWEL INCONTINENCE: 0
NERVOUS/ANXIOUS: 1
BLOATING: 0
ARTHRALGIAS: 1
MEMORY LOSS: 1
MYALGIAS: 1
NECK PAIN: 1
BACK PAIN: 1
DISTURBANCES IN COORDINATION: 1
NUMBNESS: 1
BLOOD IN STOOL: 0
SEIZURES: 0
CONSTIPATION: 0

## 2021-09-30 NOTE — TELEPHONE ENCOUNTER
SPINE PATIENTS - NEW PROTOCOL PREVISIT    RECORDS RECEIVED FROM: Self   REASON FOR VISIT: Chronic bilateral low back pain with bilateral sciatica/ Weakness of foot, right/   Date of Appt: 10/7/21   NOTES (FOR ALL VISITS) STATUS DETAILS   OFFICE NOTE from referring provider N/A    OFFICE NOTE from other specialist N/A    DISCHARGE SUMMARY from hospital N/A    DISCHARGE REPORT from ER Care Everywhere [facility]:  9/17/21   EMG REPORT Care Everywhere/Internal [facility]:  2/25/20    [facility]:  3/22/19   MEDICATION LIST Care Everywhere    IMAGING  (FOR ALL VISITS)     MRI (HEAD, NECK, SPINE) Received [facility]:  MRI Lumbar Spine 9/17/21   XRAY (SPINE) *NEUROSURGERY* N/A    CT (HEAD, NECK, SPINE) N/A       Action 9/30/21 MV 10.06am   Action Taken Imaging request faxed to [facility] for:  MRI Lumbar Spine 9/17/21    --9/30/21 MV 11.16am--  Images resolved in PACS

## 2021-09-30 NOTE — GROUP NOTE
Process Group Note    PATIENT'S NAME: Joel Pineda  MRN:   [id number]  :   1975  ACCT. NUMBER: [id number]  DATE OF SERVICE: 21  START TIME:  2:00 PM  END TIME:  2:50 PM  FACILITATOR: Magali Dodson [facility]; Amy Ferro LGSW  TOPIC:  Process Group    Diagnoses:  296.33 (F33.2) Major Depressive Disorder, Recurrent Episode, Severe.  4. Other Diagnoses that is relevant to services:   300.02 (F41.1) Generalized Anxiety Disorder.       [facility] Adult Mental Health Day Treatment  TRACK: 4B    NUMBER OF PARTICIPANTS: 8          Data:    Session content: At the start of this group, patients were invited to check in by identifying themselves, describing their current emotional status, and identifying issues to address in this group.   Area(s) of treatment focus addressed in this session included Symptom Management, Personal Safety and Community Resources/Discharge Planning.  Client reported feeling  spaced out  after experiencing a  rare occurrence  involving him forgetting to take his medications the night prior.  Client reported going to bed early and not remembering to take his medications at his normal time.  Client reported getting 5 hours of sleep according to what is CPAP machine measured, and reports needing 8 hours of sleep to feel functional.  Client also reported doing research on a neurosurgeon that he had a future consultation appointment with and that his research informed him of some  derogatory information  that made client uncomfortable with.  Client explained cancelling his consultation appointment and rescheduling an appointment with a new provider 3 weeks out.  Client stated that experience has caused him to feel  weird  and reports  trying to be at peace with the medical stuff .  Writer reflected client s ability to problem solve as a way to avoid ruminating on the experience.  Client did not report any suicidal ideation, plan or intent.      Therapeutic Interventions/Treatment  Strategies:  Psychotherapist offered support, feedback and validation and reinforced use of skills. Treatment modalities used include Cognitive Behavioral Therapy. Interventions include Behavioral Activation: Explored how behaviors effect mood and interact with thoughts and feelings and Symptoms Management: Promoted understanding of their diagnoses and how it impacts their functioning.    Assessment:    Patient response:   Patient responded to session by accepting feedback, giving feedback and listening    Possible barriers to participation / learning include: and no barriers identified    Health Issues:   Yes: Neurological Issues, Associated Psychological Distress       Substance Use Review:   Substance Use: No active concerns identified.    Mental Status/Behavioral Observations  Appearance:   Appropriate   Eye Contact:   Good   Psychomotor Behavior: Normal   Attitude:   Cooperative  Interested Pleasant  Orientation:   All  Speech   Rate / Production: Normal    Volume:  Normal   Mood:    Anxious   Affect:    Appropriate   Thought Content:   Clear  Thought Form:  Coherent  Logical     Insight:    Good     Plan:     Safety Plan: No current safety concerns identified.  Recommended that patient call 911 or go to the local ED should there be a change in any of these risk factors.     Barriers to treatment: None identified    Patient Contracts (see media tab):  None    Substance Use: Not addressed in session     Continue or Discharge: Patient will continue in Adult Day Treatment (ADT)  as planned. Patient is likely to benefit from learning and using skills as they work toward the goals identified in their treatment plan.      ABHIJEET Salas  September 30, 2021

## 2021-09-30 NOTE — GROUP NOTE
Psychotherapy Group Note    PATIENT'S NAME: Joel Pineda  MRN:   [id number]  :   1975  ACCT. NUMBER: [id number]  DATE OF SERVICE: 21  START TIME:  1:00 PM  END TIME:  1:50 PM  FACILITATOR: Morenita Lau [facility]; Amy Ferro LGSW  TOPIC: MH EBP Group: Coping Skills  [facility] Adult Mental Health Day Treatment  TRACK: 4B    NUMBER OF PARTICIPANTS: 8    Summary of Group / Topics Discussed:  Coping Skills: Meditation: Patients learned about meditation and explored how and when to utilize it to increase focus, reduce mental health symptoms, decrease physical tension, and improve mental well-being.  Approaches to meditation were presented as a means of increasing self-awareness. The benefits of various meditation practices were discussed, as well as barriers to utilization of this coping strategy.     Patient Session Goals / Objectives:    Understand the purpose and efficacy of using meditation modalities to reduce stress / symptoms.    Review / discuss situations in daily life that cause distress, where establishing a meditation routine or meditating as needed may improve functioning.      Verbalize understanding of how and when to apply grounding strategies to reduce distress and increase presence in the moment.    Practice meditation and address barriers to use in daily life.        Patient Participation / Response:  Moderately participated, sharing some personal reflections / insights and adequately adequately received / provided feedback with other participants.    Demonstrated understanding of topics discussed through group discussion and participation and Expressed understanding of the relevance / importance of coping skills at distressing times in life    Treatment Plan:  Patient has a current master individualized treatment plan.  See Epic treatment plan for more information.    ABHIJEET Salas

## 2021-10-01 ENCOUNTER — DOCUMENTATION ONLY (OUTPATIENT)
Dept: NEUROSURGERY | Facility: CLINIC | Age: 46
End: 2021-10-01

## 2021-10-01 NOTE — PROGRESS NOTES
Patient has previously been seen by Dr. Emily Obando.   He reported multiple medical complaints completely unrelated to his spine/    Patient will be scheduled w/spine [facility] for initial evaluation and assessment w/regard to his spine.     Kellie Guerra DNP  Neurosurgery Nurse Practitioner  [facility]  [phone]

## 2021-10-03 ENCOUNTER — MYC REFILL (OUTPATIENT)
Dept: FAMILY MEDICINE | Facility: CLINIC | Age: 46
End: 2021-10-03

## 2021-10-03 DIAGNOSIS — M45.8 ANKYLOSING SPONDYLITIS OF SACRAL REGION (H): ICD-10-CM

## 2021-10-03 DIAGNOSIS — M47.816 LUMBAR FACET ARTHROPATHY: ICD-10-CM

## 2021-10-03 DIAGNOSIS — M51.369 DDD (DEGENERATIVE DISC DISEASE), LUMBAR: ICD-10-CM

## 2021-10-04 ENCOUNTER — HOSPITAL ENCOUNTER (OUTPATIENT)
Dept: BEHAVIORAL HEALTH | Facility: CLINIC | Age: 46
End: 2021-10-04
Attending: PSYCHIATRY & NEUROLOGY
Payer: MEDICARE

## 2021-10-04 DIAGNOSIS — Z86.19 HISTORY OF SHINGLES: ICD-10-CM

## 2021-10-04 PROCEDURE — 90853 GROUP PSYCHOTHERAPY: CPT | Mod: PO

## 2021-10-04 PROCEDURE — 90853 GROUP PSYCHOTHERAPY: CPT | Mod: GT

## 2021-10-04 PROCEDURE — 90853 GROUP PSYCHOTHERAPY: CPT | Mod: GT | Performed by: COUNSELOR

## 2021-10-04 RX ORDER — OXYCODONE HYDROCHLORIDE 5 MG/1
5 TABLET ORAL EVERY 6 HOURS PRN
Qty: 35 TABLET | Refills: 0 | Status: SHIPPED | OUTPATIENT
Start: 2021-10-04 | End: 2021-10-20

## 2021-10-04 RX ORDER — LIDOCAINE 50 MG/G
OINTMENT TOPICAL
Qty: 50 G | Refills: 0 | Status: SHIPPED | OUTPATIENT
Start: 2021-10-04 | End: 2021-11-03

## 2021-10-04 NOTE — GROUP NOTE
Process Group Note    PATIENT'S NAME: Joel Pineda  MRN:   [id number]  :   1975  ACCT. NUMBER: [id number]  DATE OF SERVICE: 10/04/21  START TIME:  1:00 PM  END TIME:  1:50 PM  FACILITATOR: Magali Dodson LICSW; Amy Ferro LGSW  TOPIC:  Process Group    Diagnoses:  296.33 (F33.2) Major Depressive Disorder, Recurrent Episode, Severe.  4. Other Diagnoses that is relevant to services:   300.02 (F41.1) Generalized Anxiety Disorder.       [facility] Adult Mental Health Day Treatment  TRACK: 4B    NUMBER OF PARTICIPANTS: 8                                      Service Modality:  Video Visit     Telemedicine Visit: The patient's condition can be safely assessed and treated via synchronous audio and visual telemedicine encounter.      Reason for Telemedicine Visit: Services only offered telehealth    Originating Site (Patient Location): Patient's home    Distant Site (Provider Location): Provider Remote Setting- Home Office    Consent:  The patient/guardian has verbally consented to: the potential risks and benefits of telemedicine (video visit) versus in person care; bill my insurance or make self-payment for services provided; and responsibility for payment of non-covered services.     Patient would like the video invitation sent by:  My Chart    Mode of Communication:  Video Conference via Medical Zoom    As the provider I attest to compliance with applicable laws and regulations related to telemedicine.            Data:    Session content: At the start of this group, patients were invited to check in by identifying themselves, describing their current emotional status, and identifying issues to address in this group.   Area(s) of treatment focus addressed in this session included Symptom Management, Personal Safety and Community Resources/Discharge Planning.  Client reported having an  alright weekend , despite having some  anticipatory anxiety  leading up to his mom and stepdad visiting.  Client  stated that they helped him complete some errands, followed by a visit to a sports bar.  Client reported having a history of paranoia and that because he was sat under a TV at the sports bar, his levels of paranoia that people were looking at him increased.  Client reported this paranoid feeling as  weird  but that he did not  dwell on it .  Client reported overall having low energy and was eager to find out when he would be able to drive again at his upcoming neurology appointment.  Client stated that while his mom offered rides to him, client knew that her physical limitations may limit her ability to offer rides. Client did not report any suicidal ideation, plan or intent.      Therapeutic Interventions/Treatment Strategies:  Psychotherapist offered support, feedback and validation and reinforced use of skills. Treatment modalities used include Cognitive Behavioral Therapy. Interventions include Emotions Management:  Increased awareness of daily mood patterns/changes and Relationship Skills: Encouraged development and maintenance  of healthy boundaries.    Assessment:    Patient response:   Patient responded to session by accepting feedback, listening and focusing on goals    Possible barriers to participation / learning include: and no barriers identified    Health Issues:   Yes: Neurological Issues, Associated Psychological Distress       Substance Use Review:   Substance Use: No active concerns identified.    Mental Status/Behavioral Observations  Appearance:   Appropriate   Eye Contact:   Good   Psychomotor Behavior: Normal   Attitude:   Cooperative  Interested Friendly Pleasant  Orientation:   All  Speech   Rate / Production: Normal    Volume:  Normal   Mood:    Anxious   Affect:    Appropriate   Thought Content:   Clear  Thought Form:  Coherent  Logical     Insight:    Good     Plan:     Safety Plan: No current safety concerns identified.  Recommended that patient call 911 or go to the local ED should there  be a change in any of these risk factors.     Barriers to treatment: None identified    Patient Contracts (see media tab):  None    Substance Use: Not addressed in session     Continue or Discharge: Patient will continue in Adult Day Treatment (ADT)  as planned. Patient is likely to benefit from learning and using skills as they work toward the goals identified in their treatment plan.      ABHIJEET Salas  October 4, 2021

## 2021-10-04 NOTE — GROUP NOTE
Psychotherapy Group Note    PATIENT'S NAME: Joel Pineda  MRN:   [id number]  :   1975  ACCT. NUMBER: [id number]  DATE OF SERVICE: 10/04/21  START TIME:  2:00 PM  END TIME:  2:50 PM  FACILITATOR: Magali Dodson [facility]; Amy Ferro LGSW  TOPIC: MH EBP Group: Behavioral Activation  [facility] Adult Mental Health Day Treatment  TRACK: 4B    NUMBER OF PARTICIPANTS:     Summary of Group / Topics Discussed:  Behavioral Activation: The Change Process - Behavior Change: Patients explored the process and types of change, including but not limited to, theories of change, steps to making change, methods of changing behavior, and potential barriers.  Patients worked to identify what changes may benefit their daily lives, and work towards a plan to implement change.      Patient Session Goals / Objectives:    Demonstrate understanding of the change process.      Identify positive and negative behavioral patterns.    Make plans to track and implement changes and share experiences in group.    Identify personal barriers to change                                      Service Modality:  Video Visit     Telemedicine Visit: The patient's condition can be safely assessed and treated via synchronous audio and visual telemedicine encounter.      Reason for Telemedicine Visit: Services only offered telehealth    Originating Site (Patient Location): Patient's home    Distant Site (Provider Location): Provider Remote Setting- Home Office    Consent:  The patient/guardian has verbally consented to: the potential risks and benefits of telemedicine (video visit) versus in person care; bill my insurance or make self-payment for services provided; and responsibility for payment of non-covered services.     Patient would like the video invitation sent by:  My Chart    Mode of Communication:  Video Conference via Medical Zoom    As the provider I attest to compliance with applicable laws and regulations related to telemedicine.             Patient Participation / Response:  Moderately participated, sharing some personal reflections / insights and adequately adequately received / provided feedback with other participants.    Demonstrated understanding of topics discussed through group discussion and participation and Expressed understanding of the relationship between behaviors, thoughts, and feelings    Treatment Plan:  Patient has a current master individualized treatment plan.  See Epic treatment plan for more information.    Amy Ferro LGSW

## 2021-10-04 NOTE — GROUP NOTE
Psychotherapy Group Note    PATIENT'S NAME: Joel Pineda  MRN:   [id number]  :   1975  ACCT. NUMBER: [id number]  DATE OF SERVICE: 10/04/21  START TIME:  3:00 PM  END TIME:  3:50 PM  FACILITATOR: Morenita Lau LPCC  TOPIC: MH EBP Group: Behavioral Activation  [facility] Adult Mental Health Day Treatment  TRACK: 4B    NUMBER OF PARTICIPANTS: 7    Summary of Group / Topics Discussed:  Behavioral Activation: Motivation and Procrastination: Patients explored how they currently spend their time, identifying thoughts and feelings that are motivating and serve to increase desired behaviors.  They also examined behaviors that contribute to procrastination.  Different types of procrastination behaviors were identified, and strategies to reduce individual procrastination and increase motivation were explored and practiced.  Patients identified ways to increase goal-directed activities to enhance mood and reduce symptoms.        Patient Session Goals / Objectives:    Identify current patterns of procrastination behavior and how they influence thoughts and moods, and inhibit motivation.    Identify behaviors that can be implemented that contribute to improving thoughts and feelings, motivation, and reduce symptoms.    Identify and develop a plan to increase activities that promote a sense of accomplishment and competence.    Practice scheduling positive activities / behaviors into daily routines.                                      Service Modality:  Video Visit     Telemedicine Visit: The patient's condition can be safely assessed and treated via synchronous audio and visual telemedicine encounter.      Reason for Telemedicine Visit: Services only offered telehealth    Originating Site (Patient Location): Patient's home    Distant Site (Provider Location): Provider Remote Setting- Home Office    Consent:  The patient/guardian has verbally consented to: the potential risks and benefits of telemedicine (video  visit) versus in person care; bill my insurance or make self-payment for services provided; and responsibility for payment of non-covered services.     Patient would like the video invitation sent by:  My Chart    Mode of Communication:  Video Conference via Medical Zoom    As the provider I attest to compliance with applicable laws and regulations related to telemedicine.            Patient Participation / Response:  Fully participated with the group by sharing personal reflections / insights and openly received / provided feedback with other participants.    Demonstrated understanding of topics discussed through group discussion and participation, Expressed understanding of the relationship between behaviors, thoughts, and feelings and Shared experiences and challenges with making behavioral changes    Treatment Plan:  Patient has a current master individualized treatment plan.  See Epic treatment plan for more information.    Morenita Lau, [facility]

## 2021-10-05 ENCOUNTER — HOSPITAL ENCOUNTER (OUTPATIENT)
Dept: BEHAVIORAL HEALTH | Facility: CLINIC | Age: 46
End: 2021-10-05
Attending: PSYCHIATRY & NEUROLOGY
Payer: MEDICARE

## 2021-10-05 PROCEDURE — 90853 GROUP PSYCHOTHERAPY: CPT | Mod: PO | Performed by: PSYCHOLOGIST

## 2021-10-05 PROCEDURE — 90853 GROUP PSYCHOTHERAPY: CPT | Mod: GT

## 2021-10-05 PROCEDURE — 90853 GROUP PSYCHOTHERAPY: CPT | Mod: PO | Performed by: COUNSELOR

## 2021-10-05 NOTE — GROUP NOTE
Process Group Note    PATIENT'S NAME: Joel Pineda  MRN:   [id number]  :   1975  ACCT. NUMBER: [id number]  DATE OF SERVICE: 10/05/21  START TIME:  1:00 PM  END TIME:  1:50 PM  FACILITATOR: Amy Ferro LGSW; Magali Dodson [facility]  TOPIC:  Process Group    Diagnoses:  296.33 (F33.2) Major Depressive Disorder, Recurrent Episode, Severe.  4. Other Diagnoses that is relevant to services:   300.02 (F41.1) Generalized Anxiety Disorder.       [facility] Adult Mental Health Day Treatment  TRACK: 4B    NUMBER OF PARTICIPANTS: 7                                      Service Modality:  Video Visit     Telemedicine Visit: The patient's condition can be safely assessed and treated via synchronous audio and visual telemedicine encounter.      Reason for Telemedicine Visit: Services only offered telehealth    Originating Site (Patient Location): Patient's home    Distant Site (Provider Location): Provider Remote Setting- Home Office    Consent:  The patient/guardian has verbally consented to: the potential risks and benefits of telemedicine (video visit) versus in person care; bill my insurance or make self-payment for services provided; and responsibility for payment of non-covered services.     Patient would like the video invitation sent by:  My Chart    Mode of Communication:  Video Conference via Medical Zoom    As the provider I attest to compliance with applicable laws and regulations related to telemedicine.           Data:    Session content: At the start of this group, patients were invited to check in by identifying themselves, describing their current emotional status, and identifying issues to address in this group.   Area(s) of treatment focus addressed in this session included Symptom Management, Personal Safety and Community Resources/Discharge Planning.  Client started his check in by apologizing to another group member for a face he made when a group member asked a get to know you question.   Client explained that at times he is unaware when his actions may come off as hurtful.  Client reported an upcoming neurology appointment on the 13th.  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  Client stated that  no ones taken away my keys but myself  and reported primarily using uber to get around.  Writer commended client on his ability to set the limit of not driving until he can get feedback from his neurologist.  Co-facilitator assessed level of worry in relation to his Proprioception and client said it has been  manageable  but  uber is expensive .  Writer referred client to medical cab rides through his insurance, but client stated he did not have the proper insurance for the program. Client did not report any suicidal ideation, plan or intent.      Therapeutic Interventions/Treatment Strategies:  Psychotherapist offered support, feedback and validation and reinforced use of skills. Treatment modalities used include Cognitive Behavioral Therapy. Interventions include Behavioral Activation: Reinforced client's ability to set boundaries for self and Symptoms Management: Promoted understanding of their diagnoses and how it impacts their functioning.    Assessment:    Patient response:   Patient responded to session by accepting feedback, giving feedback and listening    Possible barriers to participation / learning include: and no barriers identified    Health Issues:   Yes: Proprioception, Associated Psychological Distress       Substance Use Review:   Substance Use: No active concerns identified.    Mental Status/Behavioral Observations  Appearance:   Appropriate   Eye Contact:   Good   Psychomotor Behavior: Normal   Attitude:   Cooperative  Interested Friendly Pleasant  Orientation:   All  Speech   Rate / Production: Normal    Volume:  Normal   Mood:    Anxious   Affect:    Appropriate    Thought Content:   Clear  Thought Form:  Coherent  Logical     Insight:    Good     Plan:     Safety Plan: No current safety concerns identified.  Recommended that patient call 911 or go to the local ED should there be a change in any of these risk factors.     Barriers to treatment: None identified    Patient Contracts (see media tab):  None    Substance Use: Not addressed in session     Continue or Discharge: Patient will continue in Adult Day Treatment (ADT)  as planned. Patient is likely to benefit from learning and using skills as they work toward the goals identified in their treatment plan.      ABHIJEET Salas  October 5, 2021

## 2021-10-05 NOTE — GROUP NOTE
Psychotherapy Group Note                                    Service Modality:  Video Visit     Telemedicine Visit: The patient's condition can be safely assessed and treated via synchronous audio and visual telemedicine encounter.      Reason for Telemedicine Visit: Patient has requested telehealth visit, Patient unable to travel, Patient convenience (e.g. access to timely appointments / distance to available provider), Patient lives in a designated Health Professional Shortage Area (HPSA), and Services only offered telehealth    Originating Site (Patient Location): Patient's home    Distant Site (Provider Location): [facility]: [facility], [facility]    Consent:  The patient/guardian has verbally consented to: the potential risks and benefits of telemedicine (video visit) versus in person care; bill my insurance or make self-payment for services provided; and responsibility for payment of non-covered services.     Patient would like the video invitation sent by:  My Chart    Mode of Communication:  Video Conference via Medical Zoom    As the provider I attest to compliance with applicable laws and regulations related to telemedicine.        PATIENT'S NAME: Joel Pineda  MRN:   [id number]  :   1975  ACCT. NUMBER: [id number]  DATE OF SERVICE: 10/05/21  START TIME:  3:00 PM  END TIME:  3:50 PM  FACILITATOR: Senia El PsyD  TOPIC:  EBP Group: Behavioral Activation  [facility] Adult Mental Health Day Treatment  TRACK: 4B    NUMBER OF PARTICIPANTS: 6    Summary of Group / Topics Discussed:  Behavioral Activation: Activity Scheduling:Patients explored how they currently spend their time, and how specific behaviors impact thoughts and feelings.  The group explored the effect of negative and positive activities on mood states and thought patterns.  Patients identified activities that help to improve mood and thinking patterns, and developed a plan to implement positive activities  between sessions.      Patient Session Goals / Objectives:    Identify impact of current behaviors on thoughts and mood    Identify 2-3 behavioral changes that could have a positive impact on thoughts and mood    Prepare to make desired behavioral change: Create a change plan / activity schedule      Patient Participation / Response:  Fully participated with the group by sharing personal reflections / insights and openly received / provided feedback with other participants.    Expressed understanding of the relationship between behaviors, thoughts, and feelings    Treatment Plan:  Patient has a current master individualized treatment plan.  See Epic treatment plan for more information.    Ha Hubbard Psy., [place],  Licensed Clinical Psychologist

## 2021-10-05 NOTE — GROUP NOTE
Psychotherapy Group Note    PATIENT'S NAME: Joel Pineda  MRN:   [id number]  :   1975  ACCT. NUMBER: [id number]  DATE OF SERVICE: 10/05/21  START TIME:  2:00 PM  END TIME:  2:50 PM  FACILITATOR: Alexandro Kaur LMFT  TOPIC: MH EBP Group: Behavioral Activation  [facility] Adult Mental Health Day Treatment  TRACK: 4B    NUMBER OF PARTICIPANTS: 8    Summary of Group / Topics Discussed:  Behavioral Activation: Activity Scheduling:Patients explored how they currently spend their time, and how specific behaviors impact thoughts and feelings.  The group explored the effect of negative and positive activities on mood states and thought patterns.  Patients identified activities that help to improve mood and thinking patterns, and developed a plan to implement positive activities between sessions.      Patient Session Goals / Objectives:    Identify impact of current behaviors on thoughts and mood    Identify 2-3 behavioral changes that could have a positive impact on thoughts and mood    Prepare to make desired behavioral change: Create a change plan / activity schedule    Service Modality:  Video Visit     Telemedicine Visit: The patient's condition can be safely assessed and treated via synchronous audio and visual telemedicine encounter.      Reason for Telemedicine Visit: Services only offered telehealth and due to COVID-19.    Originating Site (Patient Location): Patient's home    Distant Site (Provider Location): Provider Remote Setting- Home Office    Consent:  The patient/guardian has verbally consented to: the potential risks and benefits of telemedicine (video visit) versus in person care; bill my insurance or make self-payment for services provided; and responsibility for payment of non-covered services.     Patient would like the video invitation sent by:  My Chart    Mode of Communication:  Video Conference via Medical Zoom    As the provider I attest to compliance with applicable laws and  regulations related to telemedicine.      Patient Participation / Response:  Fully participated with the group by sharing personal reflections / insights and openly received / provided feedback with other participants.    Demonstrated understanding of topics discussed through group discussion and participation, Shared experiences and challenges with making behavioral changes and Practiced skills in session    Treatment Plan:  Patient has a current master individualized treatment plan.  See Epic treatment plan for more information.    Alexandro Kaur, MAYURIFT

## 2021-10-07 ENCOUNTER — HOSPITAL ENCOUNTER (OUTPATIENT)
Dept: BEHAVIORAL HEALTH | Facility: CLINIC | Age: 46
End: 2021-10-07
Attending: PSYCHIATRY & NEUROLOGY
Payer: MEDICARE

## 2021-10-07 ENCOUNTER — PRE VISIT (OUTPATIENT)
Dept: NEUROSURGERY | Facility: CLINIC | Age: 46
End: 2021-10-07

## 2021-10-07 PROCEDURE — 90853 GROUP PSYCHOTHERAPY: CPT | Mod: PO

## 2021-10-07 PROCEDURE — 90853 GROUP PSYCHOTHERAPY: CPT | Mod: GT

## 2021-10-07 NOTE — GROUP NOTE
Psychotherapy Group Note    PATIENT'S NAME: Joel Pineda  MRN:   [id number]  :   1975  [id number]  DATE OF SERVICE: 10/07/21  START TIME:  3:00 PM  END TIME:  3:50 PM  FACILITATOR: Lashell Leonard  TOPIC:  EBP Group: DDP Relapse Prevention  [facility] Mental Health Day Treatment  TRACK: 4B                                      Service Modality:  Video Visit     Telemedicine Visit: The patient's condition can be safely assessed and treated via synchronous audio and visual telemedicine encounter.      Reason for Telemedicine Visit:  covid 19     Originating Site (Patient Location): Patient's home    Distant Site (Provider Location): Provider Remote Setting- Home Office    Consent:  The patient/guardian has verbally consented to: the potential risks and benefits of telemedicine (video visit) versus in person care; bill my insurance or make self-payment for services provided; and responsibility for payment of non-covered services.     Patient would like the video invitation sent by:  My Chart    Mode of Communication:  Video Conference via Medical Zoom    As the provider I attest to compliance with applicable laws and regulations related to telemedicine.          NUMBER OF PARTICIPANTS: 6    Summary of Group / Topics Discussed:  DDP Relapse Prevention: Observing and Describing Process of Relapse: Patients explored the process of relapse and what individual factors can contribute to relapse. This will assist patients in recognizing that relapse is a process that often begins well before the actual relapse. Patients discussed their own personal vulnerabilities, emotions, cravings, urges, situations, and thoughts that may lead to a relapse and what has led to past relapses.     Patient Session Goals / Objectives:    Verbalized understanding the importance of awareness of factors that contribute to relapse     Verbalized understanding that relapse is a process    Shared personal  vulnerabilities, thoughts, emotions, and situations that may lead to relapse     Described skills to manage factors that contribute to relapse         Patient Participation / Response:  Fully participated with the group by sharing personal reflections / insights and openly received / provided feedback with other participants.    Demonstrated understanding of topics discussed through group discussion and participation, Demonstrated understanding of utilizing relapse prevention skills to manage urges and maintain sobriety and Identified / Expressed personal readiness to utilize relapse prevention skills    Treatment Plan:  Patient has a current master individualized treatment plan.  See Epic treatment plan for more information.    Lashell Leonard

## 2021-10-07 NOTE — GROUP NOTE
Psychotherapy Group Note    PATIENT'S NAME: Joel Pineda  MRN:   [id number]  :   1975  ACCT. NUMBER: [id number]  DATE OF SERVICE: 10/07/21  START TIME:  1:00 PM  END TIME:  1:50 PM  FACILITATOR: Amy Ferro LGSW; Lashell Leonard  TOPIC: MH EBP Group: Behavioral Activation  [facility] Adult Mental Health Day Treatment  TRACK: 4B    NUMBER OF PARTICIPANTS: 8    Summary of Group / Topics Discussed:  Behavioral Activation: [place]: {Patients identified situations that prompt unwanted and unhelpful emotions / thoughts / behaviors.   Patients discussed how to problem solve by proactively using coping skills in potentially difficult situations. Components included describing the situation, brainstorming coping skills, imagining how scenario can/will unfold, rehearsing the action plan, and practicing relaxation to follow.  Patients practiced using these skills to reduce symptom distress and increase effective coping  behaviors.      Patient Session Goals / Objectives:    Identify difficult situation(s), and gain proficiency with alternative behaviors / skills to problem solve.    Increase confidence using coping skills through group practice in session.    Receive and provide feedback regarding skill development.    Apply coping skills in daily life situations.                                      Service Modality:  Video Visit     Telemedicine Visit: The patient's condition can be safely assessed and treated via synchronous audio and visual telemedicine encounter.      Reason for Telemedicine Visit: Services only offered telehealth    Originating Site (Patient Location): Patient's home    Distant Site (Provider Location): Provider Remote Setting- Home Office    Consent:  The patient/guardian has verbally consented to: the potential risks and benefits of telemedicine (video visit) versus in person care; bill my insurance or make self-payment for services provided; and responsibility for payment of  non-covered services.     Patient would like the video invitation sent by:  My Chart    Mode of Communication:  Video Conference via Medical Zoom    As the provider I attest to compliance with applicable laws and regulations related to telemedicine.            Patient Participation / Response:  Minimally participated, only when prompted / asked.    Demonstrated understanding of topics discussed through group discussion and participation    Treatment Plan:  Patient has a current master individualized treatment plan.  See Epic treatment plan for more information.    Amy Ferro LGSW

## 2021-10-11 ENCOUNTER — HOSPITAL ENCOUNTER (OUTPATIENT)
Dept: BEHAVIORAL HEALTH | Facility: CLINIC | Age: 46
End: 2021-10-11
Attending: PSYCHIATRY & NEUROLOGY
Payer: MEDICARE

## 2021-10-11 PROCEDURE — 90853 GROUP PSYCHOTHERAPY: CPT | Mod: GT

## 2021-10-11 NOTE — GROUP NOTE
Psychotherapy Group Note    PATIENT'S NAME: Joel Pineda  MRN:   [id number]  :   1975  ACCT. NUMBER: [id number]  DATE OF SERVICE: 10/11/21  START TIME:  3:00 PM  END TIME:  3:50 PM  FACILITATOR: Lashell Leonard  TOPIC: MH EBP Group: Behavioral Activation  [facility] Mental Health Day Treatment  TRACK: 4B                                      Service Modality:  Video Visit     Telemedicine Visit: The patient's condition can be safely assessed and treated via synchronous audio and visual telemedicine encounter.      Reason for Telemedicine Visit:  covid 19    Originating Site (Patient Location): Patient's home    Distant Site (Provider Location): Provider Remote Setting- Home Office    Consent:  The patient/guardian has verbally consented to: the potential risks and benefits of telemedicine (video visit) versus in person care; bill my insurance or make self-payment for services provided; and responsibility for payment of non-covered services.     Patient would like the video invitation sent by:  My Chart    Mode of Communication:  Video Conference via Medical Zoom    As the provider I attest to compliance with applicable laws and regulations related to telemedicine.         NUMBER OF PARTICIPANTS: 7    Summary of Group / Topics Discussed:  Behavioral Activation: Behavior Chain Analysis: Patients explored the steps leading up to identified unwanted behaviors, and ways to replace them with more effective behaviors. Patients examined factors contributing to unwanted behaviors, with a goal of determining ways to interrupt the unhealthy patterns.    Patient Session Goals / Objectives:    Identify thoughts, feelings, and actions that contribute to unwanted behaviors    Identify thoughts, feelings, and actions that contribute to more effective/healthy and desired behaviors    Make plans to track and implement changes and share experiences in group    Identify personal barriers to change      Patient  Participation / Response:  Fully participated with the group by sharing personal reflections / insights and openly received / provided feedback with other participants.    Demonstrated understanding of topics discussed through group discussion and participation, Expressed understanding of the relationship between behaviors, thoughts, and feelings and Shared experiences and challenges with making behavioral changes    Treatment Plan:  Patient has a current master individualized treatment plan.  See Epic treatment plan for more information.    Lashell Leonard

## 2021-10-11 NOTE — GROUP NOTE
Process Group Note    PATIENT'S NAME: Joel Pineda  MRN:   [id number]  :   1975  ACCT. NUMBER: [id number]  DATE OF SERVICE: 10/11/21  START TIME:  1:00 PM  END TIME:  1:50 PM  FACILITATOR: Magali Dodson LICSW; Amy Ferro LGSW  TOPIC:  Process Group    Diagnoses:  296.33 (F33.2) Major Depressive Disorder, Recurrent Episode, Severe _.  4. Other Diagnoses that is relevant to services:   300.00 (F41.9) Unspecified Anxiety Disorder  301.83 (F60.3) Borderline Personality Disorder.         [facility] Day Treatment  TRACK: 4B    NUMBER OF PARTICIPANTS: 8                                      Service Modality:  Video Visit     Telemedicine Visit: The patient's condition can be safely assessed and treated via synchronous audio and visual telemedicine encounter.      Reason for Telemedicine Visit: Services only offered telehealth    Originating Site (Patient Location): Patient's home    Distant Site (Provider Location): Provider Remote Setting- Home Office    Consent:  The patient/guardian has verbally consented to: the potential risks and benefits of telemedicine (video visit) versus in person care; bill my insurance or make self-payment for services provided; and responsibility for payment of non-covered services.     Patient would like the video invitation sent by:  My Chart    Mode of Communication:  Video Conference via Medical Zoom    As the provider I attest to compliance with applicable laws and regulations related to telemedicine.                Data:    Session content: At the start of this group, patients were invited to check in by identifying themselves, describing their current emotional status, and identifying issues to address in this group.   Area(s) of treatment focus addressed in this session included Symptom Management, Personal Safety and Community Resources/Discharge Planning.  Client reported that he was able to run his  and expressed pride in achieving  this task.  Client explained that he was doing the task in case his sister visited his place this week.  Client followed up by saying that he had not unloaded his  yet and that the task of doing the dishes usually happens four times per year.   Writer and group members commended client on this accomplishment.  Client reported plans to hang out with a  chrissie  over the weekend and that the plans were cancelled due to a  family deal  his friend was having.  Client reported feeling physical pain and that he is a  grumpy S.O.B  when he is in pain.  Client reported that he  doesn t like being like that .    Client reported distracting himself from feelings of physical pain and anxiety by watching Netflix.  Writer asked client about his plans for his sister s visit to which client stated that he will be getting together for dinner and staying over at his childhood home on Friday afternoon.  Writer acknowledged his knowledge of the plan as a way to minimize his anxiety over his sister s visit. Client did not report any suicidal ideation, plan or intent.      Therapeutic Interventions/Treatment Strategies:  Psychotherapist offered support, feedback and validation and reinforced use of skills. Treatment modalities used include Cognitive Behavioral Therapy. Interventions include Behavioral Activation: Encouraged strategies to reduce individual procrastination and increase motivation by increasing goal-directed activities to enhance mood and reduce symptoms. and Relationship Skills: Encouraged development and maintenance  of healthy boundaries.    Assessment:    Patient response:   Patient responded to session by accepting feedback and listening    Possible barriers to participation / learning include: and no barriers identified    Health Issues:   Yes: Pain, Associated Psychological Distress       Substance Use Review:   Substance Use: No active concerns identified.    Mental Status/Behavioral  Observations  Appearance:   Appropriate   Eye Contact:   Good   Psychomotor Behavior: Normal   Attitude:   Cooperative  Interested Friendly  Orientation:   All  Speech   Rate / Production: Normal    Volume:  Normal   Mood:    Anxious   Affect:    Appropriate   Thought Content:   Clear  Thought Form:  Coherent  Logical     Insight:    Good     Plan:     Safety Plan: No current safety concerns identified.  Recommended that patient call 911 or go to the local ED should there be a change in any of these risk factors.     Barriers to treatment: None identified    Patient Contracts (see media tab):  None    Substance Use: Not addressed in session     Continue or Discharge: Patient will continue in Adult Day Treatment (ADT)  as planned. Patient is likely to benefit from learning and using skills as they work toward the goals identified in their treatment plan.      ABHIJEET Salas  October 11, 2021

## 2021-10-12 ENCOUNTER — HOSPITAL ENCOUNTER (OUTPATIENT)
Dept: BEHAVIORAL HEALTH | Facility: CLINIC | Age: 46
End: 2021-10-12
Attending: PSYCHIATRY & NEUROLOGY
Payer: MEDICARE

## 2021-10-12 PROCEDURE — 90853 GROUP PSYCHOTHERAPY: CPT | Mod: PO

## 2021-10-12 PROCEDURE — 90853 GROUP PSYCHOTHERAPY: CPT | Mod: GT

## 2021-10-12 NOTE — GROUP NOTE
Psychotherapy Group Note    PATIENT'S NAME: Joel Pineda  MRN:   [id number]  :   1975  ACCT. NUMBER: [id number]  DATE OF SERVICE: 10/12/21  START TIME:  3:00 PM  END TIME:  3:50 PM  FACILITATOR: Lashell Leonard  TOPIC: MH EBP Group: [facility] Adult Mental Health Day Treatment  TRACK: 4B                                      Service Modality:  Video Visit     Telemedicine Visit: The patient's condition can be safely assessed and treated via synchronous audio and visual telemedicine encounter.      Reason for Telemedicine Visit:  covid 19     Originating Site (Patient Location): Patient's home    Distant Site (Provider Location): Provider Remote Setting- Home Office    Consent:  The patient/guardian has verbally consented to: the potential risks and benefits of telemedicine (video visit) versus in person care; bill my insurance or make self-payment for services provided; and responsibility for payment of non-covered services.     Patient would like the video invitation sent by:  My Chart    Mode of Communication:  Video Conference via Medical Zoom    As the provider I attest to compliance with applicable laws and regulations related to telemedicine.          NUMBER OF PARTICIPANTS: 9    Summary of Group / Topics Discussed:  Mindfulness: Mindfulness Experiential: Patients received an overview on what mindfulness is and how mindfulness can benefit general health, mental health symptoms, and stressors. The history of mindfulness, its application to mental health therapies, and key concepts were also discussed. Patients discussed current awareness, knowledge, and practice of mindfulness skills. Patients also discussed barriers to mindfulness practice.    Patient Session Goals / Objectives:    Demonstrated and verbalized understanding of key mindfulness concepts    Identified when/how to use mindfulness skills    Resolved barriers to practicing mindfulness skills    Identified plan to use  mindfulness skills in daily life       Patient Participation / Response:  Fully participated with the group by sharing personal reflections / insights and openly received / provided feedback with other participants.    Demonstrated understanding of topics discussed through group discussion and participation, Demonstrated understanding of mindfulness skills and benefits of practice and Identified / Expressed personal readiness to practice mindfulness skills    Treatment Plan:  Patient has a current master individualized treatment plan.  See Epic treatment plan for more information.    Lashell Leonard

## 2021-10-12 NOTE — GROUP NOTE
Process Group Note    PATIENT'S NAME: Joel Pineda  MRN:   [id number]  :   1975  ACCT. NUMBER: [id number]  DATE OF SERVICE: 10/12/21  START TIME:  1:00 PM  END TIME:  1:50 PM  FACILITATOR: Magali Dodson LICSW; Amy Ferro LGSW  TOPIC:  Process Group    Diagnoses:  296.33 (F33.2) Major Depressive Disorder, Recurrent Episode, Severe _.  4. Other Diagnoses that is relevant to services:   300.00 (F41.9) Unspecified Anxiety Disorder  301.83 (F60.3) Borderline Personality Disorder.         [facility] Mental Health Day Treatment  TRACK: 4B    NUMBER OF PARTICIPANTS: 9                                      Service Modality:  Video Visit     Telemedicine Visit: The patient's condition can be safely assessed and treated via synchronous audio and visual telemedicine encounter.      Reason for Telemedicine Visit: Services only offered telehealth    Originating Site (Patient Location): Patient's home    Distant Site (Provider Location): Provider Remote Setting- Home Office    Consent:  The patient/guardian has verbally consented to: the potential risks and benefits of telemedicine (video visit) versus in person care; bill my insurance or make self-payment for services provided; and responsibility for payment of non-covered services.     Patient would like the video invitation sent by:  My Chart    Mode of Communication:  Video Conference via Medical Zoom    As the provider I attest to compliance with applicable laws and regulations related to telemedicine.                Data:    Session content: At the start of this group, patients were invited to check in by identifying themselves, describing their current emotional status, and identifying issues to address in this group.   Area(s) of treatment focus addressed in this session included Symptom Management, Personal Safety and Community Resources/Discharge Planning.  Client reported completing physical therapy exercises this morning and that due to  physical limitations, he was struggling to stay compliant.   A limiting factor is that I need to strengthen my core .  Client expressed satisfaction with completing items on his to do list today.  Client then reported frustration with a $30 [employer] bill.  Writer empathized with client s frustration.  Writer encouraged client to update the group on his upcoming Wednesday neurology appointment when the group next meets on Thursday.   Client also updated the group that he would be visiting with his sister on Saturday and Sunday.  Client did not report any suicidal ideation, plan or intent.      Therapeutic Interventions/Treatment Strategies:  Psychotherapist offered support, feedback and validation and reinforced use of skills. Treatment modalities used include Cognitive Behavioral Therapy. Interventions include Symptoms Management: Promoted understanding of their diagnoses and how it impacts their functioning.    Assessment:    Patient response:   Patient responded to session by accepting feedback and listening    Possible barriers to participation / learning include: and no barriers identified    Health Issues:   Yes: Back Pain/Proprioception , Associated Psychological Distress       Substance Use Review:   Substance Use: No active concerns identified.    Mental Status/Behavioral Observations  Appearance:   Appropriate   Eye Contact:   Good   Psychomotor Behavior: Normal   Attitude:   Cooperative  Interested Pleasant  Orientation:   All  Speech   Rate / Production: Normal    Volume:  Normal   Mood:    Anxious   Affect:    Appropriate   Thought Content:   Clear  Thought Form:  Coherent  Logical     Insight:    Good     Plan:     Safety Plan: No current safety concerns identified.  Recommended that patient call 911 or go to the local ED should there be a change in any of these risk factors.     Barriers to treatment: None identified    Patient Contracts (see media tab):  None    Substance Use: Not addressed in session      Continue or Discharge: Patient will continue in Adult Day Treatment (ADT)  as planned. Patient is likely to benefit from learning and using skills as they work toward the goals identified in their treatment plan.      ABHIJEET Salas  October 12, 2021

## 2021-10-12 NOTE — GROUP NOTE
Psychotherapy Group Note    PATIENT'S NAME: Joel Pineda  MRN:   [id number]  :   1975  ACCT. NUMBER: [id number]  DATE OF SERVICE: 10/12/21  START TIME:  2:00 PM  END TIME:  2:50 PM  FACILITATOR: Lashell Leonard  TOPIC: MH EBP Group: Self-Awareness  [facility] Adult Mental Health Day Treatment  TRACK: 4B                                      Service Modality:  Video Visit     Telemedicine Visit: The patient's condition can be safely assessed and treated via synchronous audio and visual telemedicine encounter.      Reason for Telemedicine Visit:  covid 19     Originating Site (Patient Location): Patient's home    Distant Site (Provider Location): Provider Remote Setting- Home Office    Consent:  The patient/guardian has verbally consented to: the potential risks and benefits of telemedicine (video visit) versus in person care; bill my insurance or make self-payment for services provided; and responsibility for payment of non-covered services.     Patient would like the video invitation sent by:  My Chart    Mode of Communication:  Video Conference via Medical Zoom    As the provider I attest to compliance with applicable laws and regulations related to telemedicine.          NUMBER OF PARTICIPANTS: 9    Summary of Group / Topics Discussed:  Self-Awareness: Self-Compassion: Patients received overview of key concepts in developing self-compassion. Patients discussed mindfulness, self-kindness, and finding common humanity. Patients identified their current approach to problems in their lives and learned skills for increasing self-compassion. Patients identified ways they can put self-compassion skills into practice and problem solve barriers to application of skills.     Patient Session Goals / Objectives:    [place] components of self-compassion    Identify ways to practice self-compassion in daily life    Problem solve barriers to self-compassion practice      Patient Participation / Response:  Fully  participated with the group by sharing personal reflections / insights and openly received / provided feedback with other participants.    Demonstrated understanding of topics discussed through group discussion and participation, Demonstrated understanding of values, strengths, and challenges to learn about themselves and Identified / Expressed readiness to act intentionally, increase self-compassion, promote personal growth    Treatment Plan:  Patient has a current master individualized treatment plan.  See Epic treatment plan for more information.    Lashell Leonard

## 2021-10-13 ENCOUNTER — OFFICE VISIT (OUTPATIENT)
Dept: NEUROSURGERY | Facility: CLINIC | Age: 46
End: 2021-10-13
Payer: MEDICARE

## 2021-10-13 VITALS
SYSTOLIC BLOOD PRESSURE: 132 MMHG | HEART RATE: 64 BPM | BODY MASS INDEX: 36.75 KG/M2 | WEIGHT: 315 LBS | OXYGEN SATURATION: 99 % | DIASTOLIC BLOOD PRESSURE: 87 MMHG | RESPIRATION RATE: 16 BRPM

## 2021-10-13 DIAGNOSIS — M45.8 ANKYLOSING SPONDYLITIS OF SACRAL REGION (H): Primary | ICD-10-CM

## 2021-10-13 DIAGNOSIS — G62.9 PERIPHERAL POLYNEUROPATHY: ICD-10-CM

## 2021-10-13 PROCEDURE — 99214 OFFICE O/P EST MOD 30 MIN: CPT | Performed by: NURSE PRACTITIONER

## 2021-10-13 ASSESSMENT — PAIN SCALES - GENERAL: PAINLEVEL: MODERATE PAIN (5)

## 2021-10-13 NOTE — LETTER
"10/13/2021       RE: Joel Pineda  [address] [place] [address]     Dear Colleague,    Thank you for referring your patient, Joel Pineda, to the [facility]UROS[facility] [place] at [facility]. Please see a copy of my visit note below.    NEUROSURGERY CLINIC NOTE       Reason for visit:             Back pain, right foot numbness, and bilateral foot/toe numbness     History of present illness:  Tito Pineda is a very pleasant 46 year old male with past medical history of hypothyroidism, bipolar, depressive disorder, diverticulosis, Reflux, hypertension, IBS, Polyneuropathy, sleep apnea, asthma, intracranial arachnoid cyst, and anklylosing spondylitis of sacral region, who is seen for his back pain and numbness of right foot.   He has followed in the Neurosurgery clinic in the past by Dr. Ang (arachnoid cyst), and Dr. Obando for both his cervical and lumbar spine.  He was also seen by Dr. Maldonado in orthopedic spine for his sacral ankylosing spondylitis.  He has a history of lumbar spine in 2007 by Dr. Bryant Mcdermott.    He did well following this surgery.   He saw a re-occurrence of pain within two months of this surgery.   He has been dealing with on/off pain /symptoms since that time   The week of October 13th, he was having difficulty driving due to \"missing the pedal\"  His right foot was significantly numb  He participates with physical therapy for plantar fasciitis (plantar fasciitis)   Back pain -   Stiff in am, and pain, which increases throughout the day.   Pain rating -  On average 4-5/10   No radiating pain down the bilateral legs   He does have pain in left calf, and some pain above right knee.   Extreme pain in toes bilaterally (intermittent)   He does have polyneuropathy (dx'd w/EM[facility] Dr. Santamaria - Neurology)   Numbness in foot / toes, bilateral    Ambulating independently without assistive device   No bowel or bladder concerns or " "complaints   He has been evaluated in the past by Dr. Maldonado in the past for possible SI joint fusion   Due to his dx of ankylosing spondylitis       Pain Relevant Medications:   Opiates:  Yes.   NSAIDS:   Muscle Relaxants: Yes.   Anti-depressants:   Sleep aids:   Anxiolytics:   Neuropathics:          Topicals:   Other medications not covered above:      Past Pain Treatments:   Pain Clinic:     Yes.  [facility] (Stacia Jack CNP), Dr Diaz  [facility]    [facility]   [facility]June of [facility] -     \"Hurt like hell\"   Chiropractor:   Yes. - in the past, but discontinued d/t pain  Acupuncture:   In the past (at least 5 years ago)   \"worthless\"  TENs Unit:  Yes.  Helpful   Injections:   Yes.  RFA, and SAMINA (about a year ago)     Not particularly helpful   Surgeries related to pain:   Yes.  2007       Review of systems: 8 point ROS negative except for as detailed in HPI    Medications:  Current Outpatient Medications   Medication     acetaminophen 500 MG CAPS     albuterol (PROAIR HFA/PROVENTIL HFA/VENTOLIN HFA) 108 (90 Base) MCG/ACT inhaler     aspirin (ASA) 81 MG tablet     cetirizine (ZYRTEC) 10 MG tablet     cholecalciferol (D3-50) 1250 mcg (73763 units) capsule     clonazePAM (KLONOPIN) 0.5 MG tablet     cyanocobalamin (CYANOCOBALAMIN) 1000 MCG/ML injection     dronabinol (MARINOL) 5 MG capsule     DULoxetine (CYMBALTA) 60 MG capsule                  For Mood and for pain      EPINEPHrine (ANY BX GENERIC EQUIV) 0.3 MG/0.3ML injection 2-pack     eszopiclone (LUNESTA) 3 MG tablet     etanercept (ENBREL SURECLICK) 50 MG/ML autoinjector     famotidine (PEPCID) 20 MG tablet     fluticasone (FLONASE) 50 MCG/ACT nasal spray     fluticasone-salmeterol (ADVAIR) 250-50 MCG/DOSE inhaler     LATUDA 40 MG TABS tablet     levothyroxine (SYNTHROID/LEVOTHROID) 75 MCG tablet     lidocaine (XYLOCAINE) 5 % external ointment     melatonin 3 MG tablet     methocarbamol (ROBAXIN) 500 MG tablet                  3 x/day      metoclopramide " (REGLAN) 5 MG tablet     metoprolol succinate ER (TOPROL-XL) 200 MG 24 hr tablet     montelukast (SINGULAIR) 10 MG tablet     nabumetone (RELAFEN) 500 MG tablet     naloxone (NARCAN) 4 MG/0.1ML nasal spray     olopatadine (PATADAY) 0.2 % ophthalmic solution     omega-3 acid ethyl esters (LOVAZA) 1 g capsule     omeprazole 20 MG tablet     oxyCODONE (ROXICODONE) 5 MG tablet       rx'd  PCP (for years)           1 /day  -  At most 3 /day      pregabalin (LYRICA) 150 MG capsule                        Twice a day      pyridostigmine (MESTINON) 60 MG tablet     ramipril (ALTACE) 10 MG capsule     rizatriptan (MAXALT-MLT) 5 MG ODT     rosuvastatin (CRESTOR) 40 MG tablet     vitamin B complex with vitamin C (STRESS TAB) tablet     ZINC SULFATE-VITAMIN C MT       Physical exam:        BMI:     36   /87   Pulse 64   Resp 16   Wt 144.2 kg (318 lb)   SpO2 99%   BMI 36.75 kg/m      General    Alert, cooperative.  No acute distress  Pulmonary:   Breathing comfortably on room air. No cough, or shortness of breath  Skin:    Visible skin without lesions or obvious rash  Speech is fluent  Maintains eye contact  Musculoskeletal:    Moving extremities freely with good strength  Lower extremity strength testing intact at 5/5  Including dorsiflexion, plantarflexion, and EHL  Negative pelvic compression test  Negative thigh thrust   Able to walk on heels /toes  Negative straight leg raise bilaterally   Negative AKOSUA bilaterally        Order: [id number]    Ref Range & Units 1 mo ago 8 mo ago    Hemoglobin A1C 0.0 - 5.6 % 6.0High   6.5High  R, CM    Comment: Normal <5.7%   Prediabetes 5.7-6.4%     Diabetes 6.5% or higher       Note: Adopted from ADA consensus guidelines.           Imaging:  EXAM: MR SPINE LUMBAR WO   LOCATION: [facility]   DATE/TIME: 9/17/2021 3:31 PM     FINDINGS:   Nomenclature is based on 5 lumbar type vertebral bodies. Satisfactory vertebral body height. Satisfactory alignment. Interspace narrowing lower  lumbar levels. No evidence for marrow edema. There is some increased IR signal associated with dorsal paraspinous musculature to the left of midline at lower lumbar levels with history of laminectomy. This likely represents denervation/procedural edema and atrophy. Degenerative facet joint arthropathy lower lumbar levels with degenerative changes both SI joints. No focal fluid collections or evidence for pseudomeningocele. Normal distal spinal cord and cauda equina with conus medullaris at L1. No cord expansive changes are present. Nerve roots of the cauda equina are satisfactory. No morphologic evidence to suggest arachnoiditis.     No retroperitoneal solid mass or adenopathy. Symmetric psoas appearance. Distention of the urinary bladder. Satisfactory sacral alignment. Nothing for sacral insufficiency or stress fracture. No presacral mass or fluid collections are noted.     T12-L1: Normal disc height and signal. No herniation. Normal facets. No spinal canal or neural foraminal stenosis.     L1-L2: Normal disc height and signal. No herniation. Normal facets. No spinal canal or neural foraminal stenosis.     L2-L3: Normal disc height and signal. No herniation. Normal facets. No spinal canal or neural foraminal stenosis.      L3-L4: Mild loss of disc height. Annular bulge. Shallow morphology central disc protrusion with annular tear/disruption results in mild spinal stenosis. No significant foraminal compromise with mild degenerative changes involving the facet joints bilaterally.     L4-L5: Mild loss of disc height with generalized disc bulge. Superimposed broad-based central and left paracentral disc extrusion. Mild spinal stenosis. Mild to moderate bilateral foraminal compromise. Mild narrowing of the left L5 subarticular recess with facet joint arthropathy. Suspected left laminotomy change.     L5-S1: Moderate loss of disc height with generalized disc bulging. Broad-based central and left paracentral disc  extrusion with annular tear/disruption. Contact without displacement of the traversing S1 nerve roots left more than right, correlate for S1 radiculopathy. This is seen series 8 image 35. No spinal stenosis. Moderate foraminal compromise bilaterally. Mild narrowing left S1 subarticular recess with facet joint arthropathy.     IMPRESSION:   1.  Degenerative changes with interspace narrowing and disc herniations with associated annular tear/disruptions L3-L4 through L5-S1 as above.     2.  No high-grade spinal stenosis or high-grade foraminal narrowing.     3.  Moderate foraminal stenosis is noted bilaterally L4-L5 and L5-S1. Correlate for possible unilateral or bilateral L4 and/or L5 radiculopathy.     4.  Correlate for possible left S1 radiculopathy discussed at the L5-S1 level.        Assessment:  ~Chronic back pain   ~Bilateral foot numbness      Plan:  Bilateral EMG nerve study   Follow up after nerve study by telephone     At the end of the visit, all the patient's questions and concerns had been addressed and the patient was agreeable with the plan of care as outlined above. The patient has our office contact information at [phone], and knows to call with any questions, concerns, or changes in condition.        Kellie Guerra, VEE  Neurosurgery Nurse Practitioner  [facility] [place]  [phone]        Again, thank you for allowing me to participate in the care of your patient.      Sincerely,    Kellie Guerra, APRN CNP

## 2021-10-13 NOTE — PROGRESS NOTES
"NEUROSURGERY CLINIC NOTE       Reason for visit:             Back pain, right foot numbness, and bilateral foot/toe numbness     History of present illness:  Tito Pineda is a very pleasant 46 year old male with past medical history of hypothyroidism, bipolar, depressive disorder, diverticulosis, Reflux, hypertension, IBS, Polyneuropathy, sleep apnea, asthma, intracranial arachnoid cyst, and anklylosing spondylitis of sacral region, who is seen for his back pain and numbness of right foot.   He has followed in the Neurosurgery clinic in the past by Dr. Ang (arachnoid cyst), and Dr. Obando for both his cervical and lumbar spine.  He was also seen by Dr. Maldonado in orthopedic spine for his sacral ankylosing spondylitis.  He has a history of lumbar spine in 2007 by Dr. Bryant Mcdermott.    He did well following this surgery.   He saw a re-occurrence of pain within two months of this surgery.   He has been dealing with on/off pain /symptoms since that time   The week of October 13th, he was having difficulty driving due to \"missing the pedal\"  His right foot was significantly numb  He participates with physical therapy for plantar fasciitis (plantar fasciitis)   Back pain -   Stiff in am, and pain, which increases throughout the day.   Pain rating -  On average 4-5/10   No radiating pain down the bilateral legs   He does have pain in left calf, and some pain above right knee.   Extreme pain in toes bilaterally (intermittent)   He does have polyneuropathy (dx'd w/EMG Dr. Santamaria - Neurology)   Numbness in foot / toes, bilateral    Ambulating independently without assistive device   No bowel or bladder concerns or complaints   He has been evaluated in the past by Dr. Maldonado in the past for possible SI joint fusion   Due to his dx of ankylosing spondylitis       Pain Relevant Medications:   Opiates:  Yes.   NSAIDS:   Muscle Relaxants: Yes.   Anti-depressants:   Sleep aids:   Anxiolytics:   Neuropathics:          Topicals:   Other " "medications not covered above:      Past Pain Treatments:   Pain Clinic:     Yes.  [facility] (Stacia Jack CNP), Dr Diaz  PT:    Orthology-   May /June of this year -     \"Hurt like hell\"   Chiropractor:   Yes. - in the past, but discontinued d/t pain  Acupuncture:   In the past (at least 5 years ago)   \"worthless\"  TENs Unit:  Yes.  Helpful   Injections:   Yes.  RFA, and SAMINA (about a year ago)     Not particularly helpful   Surgeries related to pain:   Yes.  2007       Review of systems: 8 point ROS negative except for as detailed in HPI    Medications:  Current Outpatient Medications   Medication     acetaminophen 500 MG CAPS     albuterol (PROAIR HFA/PROVENTIL HFA/VENTOLIN HFA) 108 (90 Base) MCG/ACT inhaler     aspirin (ASA) 81 MG tablet     cetirizine (ZYRTEC) 10 MG tablet     cholecalciferol (D3-50) 1250 mcg (70883 units) capsule     clonazePAM (KLONOPIN) 0.5 MG tablet     cyanocobalamin (CYANOCOBALAMIN) 1000 MCG/ML injection     dronabinol (MARINOL) 5 MG capsule     DULoxetine (CYMBALTA) 60 MG capsule                  For Mood and for pain      EPINEPHrine (ANY BX GENERIC EQUIV) 0.3 MG/0.3ML injection 2-pack     eszopiclone (LUNESTA) 3 MG tablet     etanercept (ENBREL SURECLICK) 50 MG/ML autoinjector     famotidine (PEPCID) 20 MG tablet     fluticasone (FLONASE) 50 MCG/ACT nasal spray     fluticasone-salmeterol (ADVAIR) 250-50 MCG/DOSE inhaler     LATUDA 40 MG TABS tablet     levothyroxine (SYNTHROID/LEVOTHROID) 75 MCG tablet     lidocaine (XYLOCAINE) 5 % external ointment     melatonin 3 MG tablet     methocarbamol (ROBAXIN) 500 MG tablet                  3 x/day      metoclopramide (REGLAN) 5 MG tablet     metoprolol succinate ER (TOPROL-XL) 200 MG 24 hr tablet     montelukast (SINGULAIR) 10 MG tablet     nabumetone (RELAFEN) 500 MG tablet     naloxone (NARCAN) 4 MG/0.1ML nasal spray     olopatadine (PATADAY) 0.2 % ophthalmic solution     omega-3 acid ethyl esters (LOVAZA) 1 g capsule     " omeprazole 20 MG tablet     oxyCODONE (ROXICODONE) 5 MG tablet       rx'd  PCP (for years)           1 /day  -  At most 3 /day      pregabalin (LYRICA) 150 MG capsule                        Twice a day      pyridostigmine (MESTINON) 60 MG tablet     ramipril (ALTACE) 10 MG capsule     rizatriptan (MAXALT-MLT) 5 MG ODT     rosuvastatin (CRESTOR) 40 MG tablet     vitamin B complex with vitamin C (STRESS TAB) tablet     ZINC SULFATE-VITAMIN C MT       Physical exam:        BMI:     36   /87   Pulse 64   Resp 16   Wt 144.2 kg (318 lb)   SpO2 99%   BMI 36.75 kg/m      General    Alert, cooperative.  No acute distress  Pulmonary:   Breathing comfortably on room air. No cough, or shortness of breath  Skin:    Visible skin without lesions or obvious rash  Speech is fluent  Maintains eye contact  Musculoskeletal:    Moving extremities freely with good strength  Lower extremity strength testing intact at 5/5  Including dorsiflexion, plantarflexion, and EHL  Negative pelvic compression test  Negative thigh thrust   Able to walk on heels /toes  Negative straight leg raise bilaterally   Negative AKOSUA bilaterally        Order: [id number]    Ref Range & Units 1 mo ago 8 mo ago    Hemoglobin A1C 0.0 - 5.6 % 6.0High   6.5High  R, CM    Comment: Normal <5.7%   Prediabetes 5.7-6.4%     Diabetes 6.5% or higher       Note: Adopted from ADA consensus guidelines.           Imaging:  EXAM: MR SPINE LUMBAR WO   LOCATION: [facility]   DATE/TIME: 9/17/2021 3:31 PM     FINDINGS:   Nomenclature is based on 5 lumbar type vertebral bodies. Satisfactory vertebral body height. Satisfactory alignment. Interspace narrowing lower lumbar levels. No evidence for marrow edema. There is some increased IR signal associated with dorsal paraspinous musculature to the left of midline at lower lumbar levels with history of laminectomy. This likely represents denervation/procedural edema and atrophy. Degenerative facet joint arthropathy lower  lumbar levels with degenerative changes both SI joints. No focal fluid collections or evidence for pseudomeningocele. Normal distal spinal cord and cauda equina with conus medullaris at L1. No cord expansive changes are present. Nerve roots of the cauda equina are satisfactory. No morphologic evidence to suggest arachnoiditis.     No retroperitoneal solid mass or adenopathy. Symmetric psoas appearance. Distention of the urinary bladder. Satisfactory sacral alignment. Nothing for sacral insufficiency or stress fracture. No presacral mass or fluid collections are noted.     T12-L1: Normal disc height and signal. No herniation. Normal facets. No spinal canal or neural foraminal stenosis.     L1-L2: Normal disc height and signal. No herniation. Normal facets. No spinal canal or neural foraminal stenosis.     L2-L3: Normal disc height and signal. No herniation. Normal facets. No spinal canal or neural foraminal stenosis.      L3-L4: Mild loss of disc height. Annular bulge. Shallow morphology central disc protrusion with annular tear/disruption results in mild spinal stenosis. No significant foraminal compromise with mild degenerative changes involving the facet joints bilaterally.     L4-L5: Mild loss of disc height with generalized disc bulge. Superimposed broad-based central and left paracentral disc extrusion. Mild spinal stenosis. Mild to moderate bilateral foraminal compromise. Mild narrowing of the left L5 subarticular recess with facet joint arthropathy. Suspected left laminotomy change.     L5-S1: Moderate loss of disc height with generalized disc bulging. Broad-based central and left paracentral disc extrusion with annular tear/disruption. Contact without displacement of the traversing S1 nerve roots left more than right, correlate for S1 radiculopathy. This is seen series 8 image 35. No spinal stenosis. Moderate foraminal compromise bilaterally. Mild narrowing left S1 subarticular recess with facet joint  arthropathy.     IMPRESSION:   1.  Degenerative changes with interspace narrowing and disc herniations with associated annular tear/disruptions L3-L4 through L5-S1 as above.     2.  No high-grade spinal stenosis or high-grade foraminal narrowing.     3.  Moderate foraminal stenosis is noted bilaterally L4-L5 and L5-S1. Correlate for possible unilateral or bilateral L4 and/or L5 radiculopathy.     4.  Correlate for possible left S1 radiculopathy discussed at the L5-S1 level.        Assessment:  ~Chronic back pain   ~Bilateral foot numbness      Plan:  Bilateral EMG nerve study   Follow up after nerve study by telephone     At the end of the visit, all the patient's questions and concerns had been addressed and the patient was agreeable with the plan of care as outlined above. The patient has our office contact information at [phone], and knows to call with any questions, concerns, or changes in condition.        Kellie Guerra DNP  Neurosurgery Nurse Practitioner  [facility] [place]  [phone]

## 2021-10-14 ENCOUNTER — HOSPITAL ENCOUNTER (OUTPATIENT)
Dept: BEHAVIORAL HEALTH | Facility: CLINIC | Age: 46
End: 2021-10-14
Attending: PSYCHIATRY & NEUROLOGY
Payer: MEDICARE

## 2021-10-14 PROCEDURE — 90853 GROUP PSYCHOTHERAPY: CPT | Mod: PO

## 2021-10-14 NOTE — TELEPHONE ENCOUNTER
PA Initiation    Medication: Enbrel PA Initiated  Insurance Company: [employer] - Phone [phone] Fax [phone]  Pharmacy Filling the Rx: [facility] - [place], [place] - [facility] [address]  Filling Pharmacy Phone:    Filling Pharmacy Fax:    Start Date: 2/26/2021       Hatchet Flap Text: The defect edges were debeveled with a #15 scalpel blade.  Given the location of the defect, shape of the defect and the proximity to free margins a hatchet flap was deemed most appropriate.  Using a sterile surgical marker, an appropriate hatchet flap was drawn incorporating the defect and placing the expected incisions within the relaxed skin tension lines where possible.    The area thus outlined was incised deep to adipose tissue with a #15 scalpel blade.  The skin margins were undermined to an appropriate distance in all directions utilizing iris scissors.

## 2021-10-14 NOTE — GROUP NOTE
Psychotherapy Group Note    PATIENT'S NAME: Joel Pineda  MRN:   [id number]  :   1975  ACCT. NUMBER: [id number]  DATE OF SERVICE: 10/14/21  START TIME:  1:00 PM  END TIME:  1:50 PM  FACILITATOR: Amy Ferro LGSW  TOPIC: MH EBP Group: Coping Skills  [facility] Adult Mental Health Day Treatment  TRACK: 4B    NUMBER OF PARTICIPANTS: 8    Summary of Group / Topics Discussed:  Coping Skills: Additional Coping Skills:  Patients discussed and practiced the PLEASE coping skill.  Reviewed the benefits of applying the aforementioned coping strategies.  Patients explored how these strategies might be applied to daily stressors or distressing situations.    Patient Session Goals / Objectives:    Understand the purpose and benefits of applying PLEASE coping strategies    Discussing the Mind-Body Connection (Taking Care of Your Mind by Taking Care of Your Body)    Address barriers to utilizing coping skills when in distress.                                      Service Modality:  Video Visit     Telemedicine Visit: The patient's condition can be safely assessed and treated via synchronous audio and visual telemedicine encounter.      Reason for Telemedicine Visit: Services only offered telehealth    Originating Site (Patient Location): Patient's home    Distant Site (Provider Location): Provider Remote Setting- Home Office    Consent:  The patient/guardian has verbally consented to: the potential risks and benefits of telemedicine (video visit) versus in person care; bill my insurance or make self-payment for services provided; and responsibility for payment of non-covered services.     Patient would like the video invitation sent by:  My Chart    Mode of Communication:  Video Conference via Medical Zoom    As the provider I attest to compliance with applicable laws and regulations related to telemedicine.           Patient Participation / Response:  Moderately participated, sharing some personal reflections /  insights and adequately adequately received / provided feedback with other participants.    Demonstrated understanding of topics discussed through group discussion and participation, Expressed understanding of the relevance / importance of coping skills at distressing times in life and Demonstrated knowledge of when to consider using a variety of coping skills in daily life    Treatment Plan:  Patient has a current master individualized treatment plan.  See Epic treatment plan for more information.    Amy Ferro LGSW

## 2021-10-14 NOTE — GROUP NOTE
Psychotherapy Group Note    PATIENT'S NAME: Joel Pineda  MRN:   [id number]  :   1975  ACCT. NUMBER: [id number]  DATE OF SERVICE: 10/14/21  START TIME:  3:00 PM  END TIME:  3:50 PM  FACILITATOR: Lashell Leonard  TOPIC: MH EBP Group: Cognitive Restructuring  [facility] Adult Mental Health Day Treatment  TRACK: 4B                                      Service Modality:  Video Visit     Telemedicine Visit: The patient's condition can be safely assessed and treated via synchronous audio and visual telemedicine encounter.      Reason for Telemedicine Visit:  covid 19    Originating Site (Patient Location): Patient's home    Distant Site (Provider Location): Provider Remote Setting- Home Office    Consent:  The patient/guardian has verbally consented to: the potential risks and benefits of telemedicine (video visit) versus in person care; bill my insurance or make self-payment for services provided; and responsibility for payment of non-covered services.     Patient would like the video invitation sent by:  My Chart    Mode of Communication:  Video Conference via Medical Zoom    As the provider I attest to compliance with applicable laws and regulations related to telemedicine.         NUMBER OF PARTICIPANTS: 8    Summary of Group / Topics Discussed:  Cognitive Restructuring: Distortions: Patients received an overview of how to identify common cognitive distortions. Patients will explore alternatives to cognitive distortions and practice challenging their negative thought patterns. The goal is to help patients target modify ineffective thought patterns.     Patient Session Goals / Objectives:    Familiarized self with ineffective / unhealthy thoughts and how they develop.      Explored impact of ineffective thoughts / distortions on mood and activity    Formulated new neutral/positive alternatives to challenge less helpful / ineffective thoughts.    Practiced and plan to apply in daily  life               Patient Participation / Response:  Fully participated with the group by sharing personal reflections / insights and openly received / provided feedback with other participants.    Demonstrated understanding of topics discussed through group discussion and participation, Expressed understanding of the relationship between behaviors, thoughts, and feelings and Demonstrated knowledge of personal thought patterns and how they impact their mood and behavior.    Treatment Plan:  Patient has a current master individualized treatment plan.  See Epic treatment plan for more information.    Lashell Leonard

## 2021-10-18 ENCOUNTER — HOSPITAL ENCOUNTER (OUTPATIENT)
Dept: BEHAVIORAL HEALTH | Facility: CLINIC | Age: 46
End: 2021-10-18
Attending: PSYCHIATRY & NEUROLOGY
Payer: MEDICARE

## 2021-10-18 PROCEDURE — 90853 GROUP PSYCHOTHERAPY: CPT | Mod: GT

## 2021-10-18 PROCEDURE — 90853 GROUP PSYCHOTHERAPY: CPT | Mod: PO | Performed by: COUNSELOR

## 2021-10-18 PROCEDURE — 90853 GROUP PSYCHOTHERAPY: CPT | Mod: PO

## 2021-10-18 NOTE — GROUP NOTE
Process Group Note    PATIENT'S NAME: Joel Pineda  MRN:   [id number]  :   1975  ACCT. NUMBER: [id number]  DATE OF SERVICE: 10/18/21  START TIME:  1:00 PM  END TIME:  1:50 PM  FACILITATOR: Lashell Leonard  TOPIC:  Process Group    Diagnoses:  296.33 (F33.2) Major Depressive Disorder, Recurrent Episode, Severe _.  4. Other Diagnoses that is relevant to services:   300.00 (F41.9) Unspecified Anxiety Disorder  301.83 (F60.3) Borderline Personality Disorder.      [facility] Mental Health Day Treatment  TRACK: 4B                                      Service Modality:  Video Visit     Telemedicine Visit: The patient's condition can be safely assessed and treated via synchronous audio and visual telemedicine encounter.      Reason for Telemedicine Visit:  covid 19    Originating Site (Patient Location): Patient's home    Distant Site (Provider Location): Provider Remote Setting- Home Office    Consent:  The patient/guardian has verbally consented to: the potential risks and benefits of telemedicine (video visit) versus in person care; bill my insurance or make self-payment for services provided; and responsibility for payment of non-covered services.     Patient would like the video invitation sent by:  My Chart    Mode of Communication:  Video Conference via Medical Zoom    As the provider I attest to compliance with applicable laws and regulations related to telemedicine.          NUMBER OF PARTICIPANTS: 8          Data:    Session content: At the start of this group, patients were invited to check in by identifying themselves, describing their current emotional status, and identifying issues to address in this group.   Area(s) of treatment focus addressed in this session included Symptom Management, Personal Safety, Develop / Improve Independent Living Skills and Develop Socialization / Interpersonal Relationship Skills.  Tito reported spending time with sister which was positive.  He had to  work on letting go of and pushing away controlling comments by family.  He reported seeing parents' cognitive decline is difficult.  He is feeling somewhat drained from pain meds.  He did not endorse safety concerns.  He hopes to engage in some productivity tasks like washing the windows.      Therapeutic Interventions/Treatment Strategies:  Psychotherapist offered support, feedback and validation and reinforced use of skills. Treatment modalities used include Motivational Interviewing, Cognitive Behavioral Therapy and Dialectical Behavioral Therapy.  Validated and normalized.  Highlighted and reinforced skills used; connected to positive outcomes.  Provided additional skill suggestions.  Aided in creating a plan to help work towards goals.      Assessment:    Patient response:   Patient responded to session by accepting feedback, giving feedback, listening, focusing on goals, being attentive and accepting support    Possible barriers to participation / learning include: and no barriers identified    Health Issues:   Yes: Pain, Associated Psychological Distress       Substance Use Review:   Substance Use: No active concerns identified.    Mental Status/Behavioral Observations  Appearance:   Appropriate   Eye Contact:   Good   Psychomotor Behavior: Normal   Attitude:   Cooperative   Orientation:   All  Speech   Rate / Production: Normal    Volume:  Normal   Mood:    Anxious  Depressed   Affect:    Appropriate   Thought Content:   Rumination  Thought Form:  Coherent  Logical     Insight:    Good     Plan:     Safety Plan: No current safety concerns identified.  Recommended that patient call 911 or go to the local ED should there be a change in any of these risk factors.     Barriers to treatment: None identified    Patient Contracts (see media tab):  None    Substance Use: Not addressed in session     Continue or Discharge: Patient will continue in Adult Day Treatment (ADT)  as planned. Patient is likely to benefit  from learning and using skills as they work toward the goals identified in their treatment plan.      Lashell Leonard  October 18, 2021

## 2021-10-18 NOTE — GROUP NOTE
Psychotherapy Group Note    PATIENT'S NAME: Joel Pineda  MRN:   [id number]  :   1975  ACCT. NUMBER: [id number]  DATE OF SERVICE: 10/18/21  START TIME:  2:00 PM  END TIME:  2:50 PM  FACILITATOR: Morenita Lau LPCC  TOPIC: MH EBP Group: Coping Skills  [facility] Adult Mental Health Day Treatment  TRACK: 4B    NUMBER OF PARTICIPANTS: 7    Summary of Group / Topics Discussed:  Coping Skills: Radical Acceptance: Patients learned radical acceptance as a way to tolerate heightened stress in the moment.  Patients identified situations that necessitate radical acceptance.  They focused on applying acceptance of the moment to tolerate difficult emotions and events. Patients discussed how to distinguish when this can be useful in their lives and when other skills are more relevant or helpful.    Patient Session Goals / Objectives:    Understand that some amount of pain exists for each of us in our lives    Process the difficulty of acceptance in our lives and benefits of radical acceptance to mood and functioning.    Demonstrate understanding of when to use the radical acceptance to tolerate distress by providing examples of situations where this could be applied.    Identify barriers to applying radical acceptance to difficult situations and explore strategies to overcome them                                      Service Modality:  Video Visit     Telemedicine Visit: The patient's condition can be safely assessed and treated via synchronous audio and visual telemedicine encounter.      Reason for Telemedicine Visit: Services only offered telehealth    Originating Site (Patient Location): Patient's home    Distant Site (Provider Location): Provider Remote Setting- Home Office    Consent:  The patient/guardian has verbally consented to: the potential risks and benefits of telemedicine (video visit) versus in person care; bill my insurance or make self-payment for services provided; and responsibility for  payment of non-covered services.     Patient would like the video invitation sent by:  My Chart    Mode of Communication:  Video Conference via Medical Zoom    As the provider I attest to compliance with applicable laws and regulations related to telemedicine.            Patient Participation / Response:  Fully participated with the group by sharing personal reflections / insights and openly received / provided feedback with other participants.    Demonstrated understanding of topics discussed through group discussion and participation, Expressed understanding of the relevance / importance of coping skills at distressing times in life and Demonstrated knowledge of when to consider using a variety of coping skills in daily life    Treatment Plan:  Patient has a current master individualized treatment plan.  See Epic treatment plan for more information.    Morenita Lau, [facility]

## 2021-10-19 ENCOUNTER — HOSPITAL ENCOUNTER (OUTPATIENT)
Dept: BEHAVIORAL HEALTH | Facility: CLINIC | Age: 46
End: 2021-10-19
Attending: PSYCHIATRY & NEUROLOGY
Payer: MEDICARE

## 2021-10-19 ENCOUNTER — VIRTUAL VISIT (OUTPATIENT)
Dept: RHEUMATOLOGY | Facility: CLINIC | Age: 46
End: 2021-10-19
Payer: MEDICARE

## 2021-10-19 DIAGNOSIS — M45.9 ANKYLOSING SPONDYLITIS, UNSPECIFIED SITE OF SPINE (H): Primary | ICD-10-CM

## 2021-10-19 DIAGNOSIS — Z79.899 HIGH RISK MEDICATIONS (NOT ANTICOAGULANTS) LONG-TERM USE: ICD-10-CM

## 2021-10-19 PROCEDURE — 90853 GROUP PSYCHOTHERAPY: CPT | Mod: PO

## 2021-10-19 PROCEDURE — 99214 OFFICE O/P EST MOD 30 MIN: CPT | Mod: 95 | Performed by: INTERNAL MEDICINE

## 2021-10-19 RX ORDER — MEDROXYPROGESTERONE ACETATE 150 MG/ML
50 INJECTION, SUSPENSION INTRAMUSCULAR WEEKLY
Qty: 4 ML | Refills: 7 | Status: SHIPPED | OUTPATIENT
Start: 2021-10-19 | End: 2021-10-25

## 2021-10-19 NOTE — GROUP NOTE
Psychotherapy Group Note    PATIENT'S NAME: Joel Pineda  MRN:   [id number]  :   1975  ACCT. NUMBER: [id number]  DATE OF SERVICE: 10/19/21  START TIME:  2:00 PM  END TIME:  2:50 PM  FACILITATOR: Lashell Leonard  TOPIC: MH EBP Group: Cognitive Restructuring  [facility] Adult Mental Health Day Treatment  TRACK: 4B                                      Service Modality:  Video Visit     Telemedicine Visit: The patient's condition can be safely assessed and treated via synchronous audio and visual telemedicine encounter.      Reason for Telemedicine Visit:  covid 19    Originating Site (Patient Location): Patient's home    Distant Site (Provider Location): Provider Remote Setting- Home Office    Consent:  The patient/guardian has verbally consented to: the potential risks and benefits of telemedicine (video visit) versus in person care; bill my insurance or make self-payment for services provided; and responsibility for payment of non-covered services.     Patient would like the video invitation sent by:  My Chart    Mode of Communication:  Video Conference via Medical Zoom    As the provider I attest to compliance with applicable laws and regulations related to telemedicine.          NUMBER OF PARTICIPANTS: 7    Summary of Group / Topics Discussed:  Cognitive Restructuring: Distortions: Patients received an overview of how to identify common cognitive distortions. Patients will explore alternatives to cognitive distortions and practice challenging their negative thought patterns. The goal is to help patients target modify ineffective thought patterns.     Patient Session Goals / Objectives:    Familiarized self with ineffective / unhealthy thoughts and how they develop.      Explored impact of ineffective thoughts / distortions on mood and activity    Formulated new neutral/positive alternatives to challenge less helpful / ineffective thoughts.    Practiced and plan to apply in daily  life               Patient Participation / Response:  Fully participated with the group by sharing personal reflections / insights and openly received / provided feedback with other participants.    Demonstrated understanding of topics discussed through group discussion and participation, Expressed understanding of the relationship between behaviors, thoughts, and feelings and Demonstrated knowledge of personal thought patterns and how they impact their mood and behavior.    Treatment Plan:  Patient has a current master individualized treatment plan.  See Epic treatment plan for more information.    Lashell Leonard

## 2021-10-19 NOTE — GROUP NOTE
Process Group Note    PATIENT'S NAME: Joel Pineda  MRN:   [id number]  :   1975  ACCT. NUMBER: [id number]  DATE OF SERVICE: 10/19/21  START TIME:  1:00 PM  END TIME:  1:50 PM  FACILITATOR: Amy Ferro LGSW  TOPIC:  Process Group    Diagnoses:  296.33 (F33.2) Major Depressive Disorder, Recurrent Episode, Severe _.  4. Other Diagnoses that is relevant to services:   300.00 (F41.9) Unspecified Anxiety Disorder  301.83 (F60.3) Borderline Personality Disorder.         [facility] Mental Health Day Treatment  TRACK: 4B    NUMBER OF PARTICIPANTS:                                       Service Modality:  Video Visit     Telemedicine Visit: The patient's condition can be safely assessed and treated via synchronous audio and visual telemedicine encounter.      Reason for Telemedicine Visit: Services only offered telehealth    Originating Site (Patient Location): Patient's home    Distant Site (Provider Location): Provider Remote Setting- Home Office    Consent:  The patient/guardian has verbally consented to: the potential risks and benefits of telemedicine (video visit) versus in person care; bill my insurance or make self-payment for services provided; and responsibility for payment of non-covered services.     Patient would like the video invitation sent by:  My Chart    Mode of Communication:  Video Conference via Medical Zoom    As the provider I attest to compliance with applicable laws and regulations related to telemedicine.                Data:    Session content: At the start of this group, patients were invited to check in by identifying themselves, describing their current emotional status, and identifying issues to address in this group.   Area(s) of treatment focus addressed in this session included Symptom Management, Personal Safety and Community Resources/Discharge Planning.  Client reported a positive mood, despite his recent awareness that his difficulty completing activities of daily  living (ADLs) negatively correlates to his mood.  Writer referred client to waiver services through the [facility] as a way to get assistance with ADLs.  Client reported satisfaction with a recent doctor s appointment with a Rheumatologist.  Client did not report any suicidal ideation, plan or intent.      Therapeutic Interventions/Treatment Strategies:  Psychotherapist offered support, feedback and validation and reinforced use of skills. Treatment modalities used include Cognitive Behavioral Therapy. Interventions include Symptoms Management: Promoted understanding of their diagnoses and how it impacts their functioning and Emotions Management:  Increased awareness of daily mood patterns/changes.    Assessment:    Patient response:   Patient responded to session by accepting feedback, giving feedback and listening    Possible barriers to participation / learning include: and no barriers identified    Health Issues:   Yes: Difficulty with Activities of Daily Living, Associated Psychological Distress       Substance Use Review:   Substance Use: No active concerns identified.    Mental Status/Behavioral Observations  Appearance:   Appropriate   Eye Contact:   Good   Psychomotor Behavior: Normal   Attitude:   Cooperative  Interested Friendly Pleasant  Orientation:   All  Speech   Rate / Production: Normal    Volume:  Normal   Mood:    Depressed   Affect:    Appropriate   Thought Content:   Clear  Thought Form:  Coherent  Logical     Insight:    Good     Plan:     Safety Plan: No current safety concerns identified.  Recommended that patient call 911 or go to the local ED should there be a change in any of these risk factors.     Barriers to treatment: None identified    Patient Contracts (see media tab):  None    Substance Use: Not addressed in session     Continue or Discharge: Patient will continue in Adult Day Treatment (ADT)  as planned. Patient is likely to benefit from learning and using skills as they work toward  the goals identified in their treatment plan.      ABHIJEET Salas  October 19, 2021

## 2021-10-19 NOTE — PATIENT INSTRUCTIONS
Problem: Patient Care Overview  Goal: Plan of Care Review  Outcome: Ongoing (interventions implemented as appropriate)  Plan of care reviewed with patient. AAO x3. V/S stable. Patient complaining of 0/10 pain at this time. Patient on telemetry NS rhythm noted. Patient ambulating with walker assistance, fall precautions in place, patient free from fall/injury. Patient has no questions at this time. Will continue to monitor.        RHEUMATOLOGY    Dr. Oneil Baxter    [address] [place] [address]  Phone number: [phone]  Fax number: [phone]    Thank you for choosing [facility]!    Mackenzie Cavazos CMA Rheumatology

## 2021-10-19 NOTE — PROGRESS NOTES
"Joel Pineda  is a 46 year old year old male who is being evaluated via a billable video visit.      How would you like to obtain your AVS? MyChart  If the video visit is dropped, the invitation should be resent by: Text to cell phone: [phone]  Will anyone else be joining your video visit? No     Rheumatology Video Visit      Joel Pineda MRN# [id number]   YOB: 1975 Age: 46 year old      Date of visit: 10/19/21   PCP: Dr. Ksenia Lyles    Chief Complaint   Patient presents with:  Ankylosing spondylitis    Assessment and Plan     1.  Ankylosing spondylitis: Many years of inflammatory back pain but no clear sacroiliitis seen on x-rays or MRIs; then had a lumbar spine x-ray on 2/23/2018 [facility] showing \"Moderate L5-S1 and mild L4-5 degenerative disc disease and degenerative facet arthropathy. No evidence for fracture, subluxation, or spondylolisthesis. Chronic bridging enthesophyte across the lower right SI joint with irregularity of the joint space suspicious for sequelae of chronic inflammatory sacroiliitis. Correlate clinically.\"  This is complicated by a history of back surgery and degenerative changes.  HLA-B27 positive per record review but the actual lab report has not been seen.  He has a personal history of diverticulitis requiring sigmoidectomy.  Reportedly his mother has ulcerative colitis. Based on his symptoms, the x-ray results, and HLA-B27 positive, it is most likely ankylosing spondylitis and Remicade was started with improvement of lower back pain and resolution of lower back stiffness. However, Remicade was also associated with increased infections so it was changed to Simponi; Simponi was associated with hives, nausea, dizziness, and drowsiness, Humira (effective but associated with a sensation of burning on his tongue, and longstanding nausea that didn't seem related to me but did to Mr. Pineda). Has responded to TNF inhibitors so Enbrel was started and has responded well to " Enbrel (with worsening arthritis symptoms each time Enbrel has been held)  Chronic illness  - Continue Enbrel 50mg SQ every 7 days  - Labs in 6 months: CBC, Creatinine, Hepatic Panel, ESR, CRP     2.  Degenerative low back pain: Following in the pain management clinic and undergoing evaluation with neurosurgery.  Chronic illness    3. Obesity; reported hx of fatty liver disease: encouraged weight loss and discussed the health benefit    4. Neck pain: Has improved with PT exercises.  Not an issue today.    5.  Vaccinations: Vaccinations reviewed with Mr. Pineda.    - Influenza: up to date  - [id number]: up to date  - [id number]: up to date  - Shingrix: Up to date   - COVID-19: has received the Pfizer COVID-19 vaccine on 3/24/2021, 4/14/2021, 8/23/2021    Total minutes spent in evaluation with patient, documentation, , and review of pertinent studies and chart notes: 13     Mr. Pineda verbalized agreement with and understanding of the rational for the diagnosis and treatment plan.  All questions were answered to best of my ability and the patient's satisfaction. Mr. Pineda was advised to contact the clinic with any questions that may arise after the clinic visit.      Thank you for involving me in the care of the patient    Return to clinic: 6 months      HPI   Joel Pineda is a 46 year old male with a past medical history significant for hypertension, hyperlipidemia, asthma, history of pulmonary embolism, history of diverticulitis requiring sigmoidectomy, GERD, obstructive sleep apnea, bipolar disorder, hypothyroidism, B12 deficiency, CKD? (reportedly issue with contrast) and chronic pain syndrome who presents for follow-up of ankylosing spondylitis.    Today, 10/19/2021: was having issues with finding the car foot pedals with his feet, so was seen in the ED; then seen by neurosurgery with plans to have an EMG at the direction of neurosurgery. Chronic peripheral neuropathy.  Using lyrica for neuropathy with  improvement. Because he was concerned that there may be surgery needed he held Enbrel with worsening low back pain and stiffness that was subtle at first and worsened over time; restarted Enbrel yesterday. Had an MRI of the left foot; injection improved; shoe change helped; PT helped. Had blood in his stool due to a fissure; has seen colorectal surgery for this.     Mother: ulcerative colitis    Tobacco: None  EtOH: None  Drugs: None    ROS   12 point review of system was completed and negative except as noted in the HPI     Active Problem List     Patient Active Problem List   Diagnosis     Major depressive disorder, recurrent episode (H)     Intermittent asthma     Hyperlipidemia LDL goal <100     Chronic nonallergic rhinitis     Diverticulosis     GERD (gastroesophageal reflux disease)     Anxiety     LLOYD (obstructive sleep apnea)- mild (AHI 11)     Intracranial arachnoid cyst     Facet arthritis of cervical region     Acquired hypothyroidism     Bipolar 2 disorder (H)     Chronic midline low back pain without sciatica     Irritable bowel syndrome with diarrhea     B12 deficiency     Essential hypertension with goal blood pressure less than 140/90     Chronic, continuous use of opioids     Ankylosing spondylitis of sacral region (H)     Morbid obesity due to hypertriglyceridemia (H)     Fatty infiltration of liver     DDD (degenerative disc disease), lumbar     Type 2 diabetes mellitus with complication, without long-term current use of insulin (H)     Peripheral polyneuropathy     History of pulmonary embolism     Ingrown toenail     Hypertriglyceridemia     Gastroparesis     Orthostatic dizziness     POTS (postural orthostatic tachycardia syndrome)     PHN (postherpetic neuralgia)     Dysautonomia (H)     Major depressive disorder, recurrent episode, severe (H)     Chronic pain syndrome     MDD (major depressive disorder), recurrent severe, without psychosis (H)     Past Medical History     Past Medical  History:   Diagnosis Date     Acne      Acquired hypothyroidism      Allergic state      Ankylosing spondylitis lumbar region (H)      Ankylosing spondylitis of sacral region (H)      Anxiety      Bipolar 2 disorder (H)      Chest pain     Chest pain, regulated w/BP meds. Clear arteries.     Chronic pain      DDD (degenerative disc disease), lumbar      Depressive disorder      Diabetes (H)      Diverticulosis      Facet arthritis of cervical region      Gastroesophageal reflux disease      Hypertension      IBS (irritable bowel syndrome)      Intracranial arachnoid cyst      Polyneuropathy      Pulmonary embolism (H)      Skin exam, screening for cancer 12/3/2013     Sleep apnea      Uncomplicated asthma      Past Surgical History     Past Surgical History:   Procedure Laterality Date     BACK SURGERY  10/07    lumbar discectomy L5-S1     COLONOSCOPY      Note: colonoscopy scheduled with [facility] on Friday, 9/4/15     COSMETIC SURGERY  2012    Nose Exterior - functional     GI SURGERY  August 2013    Sigmoidectomy     HERNIA REPAIR, UMBILICAL  8/23/11    small, Dr. Evan Beavers     INCISION AND DRAINAGE, ABSCESS, COMPLEX  8/23/11    umbilical, Dr. Evan Beavers     LAPAROSCOPIC ASSISTED COLECTOMY LEFT (DESCENDING)  8/15/2013    Procedure: LAPAROSCOPIC ASSISTED COLECTOMY LEFT (DESCENDING);  Laparoscopic Hand Assisted Sigmoid Resection, Mobilization of Splenic Fissure, coloproctoscopy, *Latex Free Room* Anesthesia General with Pain block  ;  Surgeon: Aurora Justice MD;  Location: [facility] OR     NERVE SURGERY  8/18/11    RF ablation @ L3-S1 @ [facility]     RECONSTRUCT NOSE AND SEPTUM (FUNCTIONAL)  10/14/2011    Procedure:RECONSTRUCT NOSE AND SEPTUM (FUNCTIONAL); Functional Septorhinoplasty, Turbinate Reduction, ; Surgeon:CEDRIC CUEVAS; Location:[facility] OR     SINUS SURGERY  10/1/01    ethmoidectomy chronic sinusitis     Allergy     Allergies   Allergen Reactions     Amoxicillin-Pot Clavulanate Difficulty breathing     Banana  Shortness Of Breath     Pt reports organic Banana is okay.      Nitroglycerin Palpitations     Penicillins Anaphylaxis     Provigil [Modafinil] Shortness Of Breath     headache     Gadolinium Hives and Itching     Patient was premedicated for the contrast allergy. He did still have a reaction a few hours after injection. Hives and itching. Dr. Gomez told tech to inform pt he should only have contrast again in the future when premedicated and at a hospital. Not at an outpatient facility.      Ketoconazole      Topical cream caused swelling and itching     Dye [Contrast Dye] Other (See Comments) and Hives     Moderate flushing, CT contrast     Golimumab      Hives, bradycardia, face swelling     Neurontin [Gabapentin] Hives     Moderate hives     Nortriptyline Hives     Varicella Virus Vaccine Live      Rash     Flagyl [Metronidazole Hcl] Palpitations and Hives     Latex Rash     Metronidazole Palpitations, Other (See Comments) and Rash     dizziness (versus ciprofloxacin taken at same time)     Current Medication List     Current Outpatient Medications   Medication Sig     acetaminophen 500 MG CAPS Take 1,000 mg by mouth 2 times daily      albuterol (PROAIR HFA/PROVENTIL HFA/VENTOLIN HFA) 108 (90 Base) MCG/ACT inhaler Inhale 2 puffs into the lungs every 4 hours as needed for shortness of breath / dyspnea or wheezing     aspirin (ASA) 81 MG tablet Take 81 mg by mouth daily      cetirizine (ZYRTEC) 10 MG tablet Take 1 tablet (10 mg) by mouth At Bedtime     cholecalciferol (D3-50) 1250 mcg (08998 units) capsule TAKE 1 CAPSULE BY MOUTH EVERY 2 WEEKS     clonazePAM (KLONOPIN) 0.5 MG tablet Take 0.5 mg by mouth 2 times daily as needed      cyanocobalamin (CYANOCOBALAMIN) 1000 MCG/ML injection INJECT 1 ML INTO THE MUSCLE EVERY 30 DAYS     dronabinol (MARINOL) 5 MG capsule Take 1 capsule by mouth 2 times daily     DULoxetine (CYMBALTA) 60 MG capsule Take 120 mg by mouth daily      eszopiclone (LUNESTA) 3 MG tablet  Take 3 mg by mouth At Bedtime      etanercept (ENBREL SURECLICK) 50 MG/ML autoinjector Inject 50 mg Subcutaneous once a week . Hold for signs of infection, and seek medical attention.     famotidine (PEPCID) 20 MG tablet Prior to administration of Humira every 2 weeks (Patient taking differently: Take 20 mg by mouth every 7 days Prior to Enbrel Injections to prevent skin reaction)     fluticasone (FLONASE) 50 MCG/ACT nasal spray Spray 1 spray into both nostrils daily     fluticasone-salmeterol (ADVAIR) 250-50 MCG/DOSE inhaler Inhale 1 puff into the lungs every 12 hours     LATUDA 40 MG TABS tablet Take 40 mg by mouth daily     levothyroxine (SYNTHROID/LEVOTHROID) 75 MCG tablet TAKE 1 TABLET EVERY MORNING     lidocaine (XYLOCAINE) 5 % external ointment APPLY TOPICALLY 4 TIMES DAILY AS NEEDED FOR PAIN      melatonin 3 MG tablet Take 12 mg by mouth nightly as needed      methocarbamol (ROBAXIN) 500 MG tablet Take 1-2 tablets (500-1,000 mg) by mouth 4 times daily as needed for muscle spasms     metoclopramide (REGLAN) 5 MG tablet Take 5 mg by mouth 2 times daily     metoprolol succinate ER (TOPROL-XL) 200 MG 24 hr tablet TAKE ONE TABLET BY MOUTH TWICE DAILY      montelukast (SINGULAIR) 10 MG tablet Take 1 tablet (10 mg) by mouth every evening     nabumetone (RELAFEN) 500 MG tablet Take 1-2 tablets (500-1,000 mg) by mouth 2 times daily as needed for moderate pain (with food)     naloxone (NARCAN) 4 MG/0.1ML nasal spray Spray 1 spray (4 mg) into one nostril alternating nostrils as needed for opioid reversal every 2-3 minutes until assistance arrives     olopatadine (PATADAY) 0.2 % ophthalmic solution Place 1 drop into both eyes daily     omega-3 acid ethyl esters (LOVAZA) 1 g capsule TAKE TWO CAPSULES BY MOUTH TWICE DAILY     omeprazole 20 MG tablet Take 20 mg by mouth daily      ondansetron (ZOFRAN-ODT) 8 MG ODT tab Take 8 mg by mouth every 8 hours as needed for nausea     oxyCODONE (ROXICODONE) 5 MG tablet Take 1 tablet  "(5 mg) by mouth every 6 hours as needed for pain (maximum 6 tablet(s) per day) . Acute on chronic pain     pregabalin (LYRICA) 150 MG capsule Take 150 mg by mouth 2 times daily     pyridostigmine (MESTINON) 60 MG tablet Take 1 tablet by mouth 5 times daily     ramipril (ALTACE) 10 MG capsule Take 1 capsule (10 mg) by mouth daily     rizatriptan (MAXALT-MLT) 5 MG ODT DISSOLVE 1 TABLET BY MOUTH AT ONSET OF HEADACHE     rosuvastatin (CRESTOR) 40 MG tablet Take 1 tablet (40 mg) by mouth daily     vitamin B complex with vitamin C (STRESS TAB) tablet Take 1 tablet by mouth daily     ZINC SULFATE-VITAMIN C MT Take 1 tablet by mouth daily     EPINEPHrine (ANY BX GENERIC EQUIV) 0.3 MG/0.3ML injection 2-pack Inject 0.3 mLs (0.3 mg) into the muscle once as needed for anaphylaxis     order for DME Equipment being ordered: Assure Compression stockings (ANNETTA) Large, closed toe, thigh-high 30-40 compression     order for DME Equipment being ordered: lumbosacral belt/brace     order for DME Respironics REMSTAR 60 Series Auto CPAP 9-13 cm H2O, Wisp nasal mask w/a large cushion and a chinstrap     syringe, disposable, (BD TUBERCULIN SYRINGE) 1 ML MISC Equipment being ordered: 1 ml tuberculin syringes to be used for Vitamin B12 injections.     syringe/needle, disp, (BD INTEGRA SYRINGE) 25G X 1\" 3 ML MISC USE FOR VITAMIN B12 INJECTIONS     No current facility-administered medications for this visit.         Social History   See HPI    Family History     Family History   Problem Relation Age of Onset     Musculoskeletal Disorder Mother         back     Anxiety Disorder Mother      Colon Polyps Mother      Ulcerative Colitis Mother         and ischemic small intestine, surgery     Hypertension Mother      Breast Cancer Mother      Osteoporosis Mother      Diabetes Mother         Type 2, Diagnosed in 2014     Depression Mother         Takes Cymbalta to help with chronic pain + depx     Thyroid Disease Mother         Hypothyroidism     " "Obesity Mother         Under much better control latter half of 2015     Musculoskeletal Disorder Father         back     Substance Abuse Father      Hypertension Father      Hyperlipidemia Father      Depression Father         Off meds for many years. Seems \"ok\"     Heart Disease Maternal Grandmother      Heart Disease Maternal Grandfather      Psychotic Disorder Paternal Grandfather      Suicide Paternal Grandfather      Depression Paternal Grandfather         Pediatrician. Committed suicide by pistol in 1990.     Musculoskeletal Disorder Brother         back     Depression Brother         Expressed as anger and moodiness     Substance Abuse Brother      Substance Abuse Sister      Depression Sister         Mental Health Therapist, yet no anti-depressants?     Anxiety Disorder Sister         Mental Health Therapist, yet no anti-anxiety meds?     Other Cancer Other         Bladder Cancer - Fatal     Substance Abuse Brother      Colon Cancer No family hx of      Crohn's Disease No family hx of      Anesthesia Reaction No family hx of      Cancer No family hx of         No family history of skin cancer     Physical Exam     Temp Readings from Last 3 Encounters:   08/17/21 98.3  F (36.8  C) (Oral)   06/09/21 98.2  F (36.8  C)   05/06/21 97.1  F (36.2  C) (Tympanic)     BP Readings from Last 5 Encounters:   10/13/21 132/87   08/18/21 124/73   08/17/21 128/75   08/10/21 126/80   07/27/21 125/83     Pulse Readings from Last 1 Encounters:   10/13/21 64     Resp Readings from Last 1 Encounters:   10/13/21 16     Estimated body mass index is 36.75 kg/m  as calculated from the following:    Height as of 8/18/21: 1.981 m (6' 6\").    Weight as of 10/13/21: 144.2 kg (318 lb).    GEN: NAD. Healthy appearing adult.   HEENT: MMM.  Anicteric, noninjected sclera. No obvious external lesions of the ear and nose. Hearing intact.  PULM: No increased work of breathing  MSK:  Hands and wrists without swelling.   SKIN: No rash or jaundice " seen  PSYCH: Alert. Appropriate.     Labs / Imaging (select studies)     RF/CCP  Recent Labs   Lab Test 08/07/15  0846 09/03/14  1232   RHF <20 <20     CBC  Recent Labs   Lab Test 10/09/20  1550 12/27/19  1209 09/26/19  1010   WBC 9.6 8.0 7.7   RBC 4.92 5.20 5.15   HGB 15.2 14.9 15.2   HCT 45.9 45.0 46.2   MCV 93 87 90   RDW 13.2 14.4 13.8    263 262   MCH 30.9 28.7 29.5   MCHC 33.1 33.1 32.9   NEUTROPHIL 51.2 45.0 39.7   LYMPH 35.2 38.1 44.9   MONOCYTE 10.2 13.7 12.2   EOSINOPHIL 2.1 2.4 2.3   BASOPHIL 1.1 0.8 0.9   ANEU 4.9 3.6 3.1   ALYM 3.4 3.0 3.5   KATIE 1.0 1.1 0.9   AEOS 0.2 0.2 0.2   ABAS 0.1 0.1 0.1     CMP  Recent Labs   Lab Test 10/16/20  0824 10/09/20  1550 07/03/20  1251 01/24/20  1637 01/24/20  1637 09/26/19  1010 08/08/19  1658    139 139   < > 136  --    < >   POTASSIUM 4.2 4.1 3.9   < > 4.4  --    < >   CHLORIDE 104 108 104   < > 106  --    < >   CO2 26 22 30   < > 24  --    < >   ANIONGAP 5 8 5   < > 6  --    < >   * 128* 105*   < > 99  --    < >   BUN 10 11 17   < > 14  --    < >   CR 1.28* 0.77 0.84   < > 0.81 0.83   < >   GFRESTIMATED 66 >90 >90   < > >90 >90   < >   GFRESTBLACK 77 >90 >90   < > >90 >90   < >   ALYCE 9.4 9.3 9.4   < > 9.2  --    < >   BILITOTAL  --  0.4  --   --  0.4 0.4  --    ALBUMIN  --  4.3  --   --  4.4 4.3  --    PROTTOTAL  --  7.9  --   --  7.7 7.7  --    ALKPHOS  --  113  --   --  104 107  --    AST  --  53*  --   --  60* 48*  --    ALT  --  53  --   --  61 53  --     < > = values in this interval not displayed.     Calcium/VitaminD  Recent Labs   Lab Test 02/10/21  1809 10/16/20  0824 10/09/20  1550 07/03/20  1251 01/24/20  1637 09/13/19  0943 03/19/19  0807 01/03/19  0844   ALYCE  --  9.4 9.3 9.4   < >  --    < > 9.6   VITDT 38  --   --   --   --  40  --  38    < > = values in this interval not displayed.     ESR/CRP  Recent Labs   Lab Test 10/09/20  1550 07/03/20  1251 09/26/19  1010 04/01/19  1540   SED  --  4 4 4   CRP <2.9  --  <2.9 <2.9      Hepatitis B  Recent Labs   Lab Test 02/28/18  1157 01/06/15  1028   HBCAB Nonreactive  --    HEPBANG Nonreactive Nonreactive     Hepatitis C  Recent Labs   Lab Test 02/28/18  1157 01/06/15  1028   HCVAB Nonreactive Nonreactive   Assay performance characteristics have not been established for newborns,   infants, and children       Lyme ab screening  Recent Labs   Lab Test 02/22/19  1457   LYMEGM 0.02     Tuberculosis Screening  Recent Labs   Lab Test 04/23/21  1442 02/28/18  1157 01/06/15  1028   TBRSLT  --  Negative Negative   TBAGN  --  0.00 0.00   TBRST Negative  --   --      Immunization History     Immunization History   Administered Date(s) Administered     COVID-19,PF,Pfizer 03/24/2021, 04/14/2021, 08/23/2021     FLU 6-35 months 01/03/2019     Flu, Unspecified 08/01/2012, 09/12/2019, 08/20/2020     HepB-Adult 02/10/2021, 03/10/2021, 07/14/2021     Influenza (H1N1) 02/04/2010     Influenza (IIV3) PF 11/11/1999, 10/28/2003, 10/15/2008, 08/21/2012, 08/02/2013, 09/09/2013, 09/27/2017, 01/03/2019, 09/12/2019, 01/14/2020     Influenza Vaccine IM > 6 months Valent IIV4 (Alfuria,Fluzone) 10/02/2015, 10/10/2016, 09/27/2017, 09/07/2018, 01/03/2019, 09/12/2019, 01/14/2020     Influenza,INJ,MDCK,PF,Quad >4yrs 08/20/2020     Pneumo Conj 13-V (2010&after) 10/02/2015     Pneumococcal 23 valent 05/15/2009, 10/10/2016     TD (ADULT, 7+) 10/04/2002     TDAP Vaccine (Adacel) 07/16/2010, 01/23/2020     Td (Adult), Adsorbed 10/13/1997, 10/04/2002     Tdap (Adacel,Boostrix) 01/23/2020     Zoster vaccine recombinant adjuvanted (SHINGRIX) 05/29/2019, 09/12/2019          Chart documentation done in part with Dragon Voice recognition Software. Although reviewed after completion, some word and grammatical error may remain.      Video-Visit Details    Type of service:  Video Visit    Video Start Time: 9:28 AM    Video End Time: 9:38 AM    Originating Location (pt. Location): Home, [place]    Distant Location (provider location):   Home    Platform used for Video Visit: Sophia Baxter MD

## 2021-10-19 NOTE — GROUP NOTE
Psychotherapy Group Note    PATIENT'S NAME: Joel Pineda  MRN:   [id number]  :   1975  ACCT. NUMBER: [id number]  DATE OF SERVICE: 10/19/21  START TIME:  3:00 PM  END TIME:  3:50 PM  FACILITATOR: Lashell Leonard  TOPIC: MH EBP Group: Symptom Awareness  [facility] Day Treatment  TRACK: [id number]                                      Service Modality:  Video Visit     Telemedicine Visit: The patient's condition can be safely assessed and treated via synchronous audio and visual telemedicine encounter.      Reason for Telemedicine Visit:  covid 19    Originating Site (Patient Location): Patient's home    Distant Site (Provider Location): Provider Remote Setting- Home Office    Consent:  The patient/guardian has verbally consented to: the potential risks and benefits of telemedicine (video visit) versus in person care; bill my insurance or make self-payment for services provided; and responsibility for payment of non-covered services.     Patient would like the video invitation sent by:  My Chart    Mode of Communication:  Video Conference via Medical Zoom    As the provider I attest to compliance with applicable laws and regulations related to telemedicine.         NUMBER OF PARTICIPANTS: 7    Summary of Group / Topics Discussed:  Symptom Awareness: Mood Disorders: Patients received a general overview of mood disorders including depressive disorders, anxiety disorders, and bipolar disorders and how it relates to their current symptoms. The purpose is to promote understanding of their diagnoses and how it impacts their functioning. Patients reviewed their current awareness of symptoms and diagnoses. Patients received information regarding diagnoses, etiology, cultural, and environmental factors as well as impact on functioning.     Patient Session Goals / Objectives:    Discussed patient individual symptoms and experiences    Reviewed diagnostic criteria and etiology of diagnoses          Patient Participation / Response:  Fully participated with the group by sharing personal reflections / insights and openly received / provided feedback with other participants.    Demonstrated understanding of topics discussed through group discussion and participation, Demonstrated understanding of how information regarding symptoms can assist in management of symptoms and Identified / Expressed personal readiness to increase awareness of symptoms and apply skills as necessary    Treatment Plan:  Patient has a current master individualized treatment plan.  See Epic treatment plan for more information.    Lashell Leonard

## 2021-10-20 ENCOUNTER — MYC REFILL (OUTPATIENT)
Dept: FAMILY MEDICINE | Facility: CLINIC | Age: 46
End: 2021-10-20

## 2021-10-20 DIAGNOSIS — M45.8 ANKYLOSING SPONDYLITIS OF SACRAL REGION (H): ICD-10-CM

## 2021-10-20 DIAGNOSIS — M47.816 LUMBAR FACET ARTHROPATHY: ICD-10-CM

## 2021-10-20 DIAGNOSIS — M51.369 DDD (DEGENERATIVE DISC DISEASE), LUMBAR: ICD-10-CM

## 2021-10-20 RX ORDER — OXYCODONE HYDROCHLORIDE 5 MG/1
5 TABLET ORAL EVERY 6 HOURS PRN
Qty: 35 TABLET | Refills: 0 | Status: SHIPPED | OUTPATIENT
Start: 2021-10-20 | End: 2021-11-06

## 2021-10-21 ENCOUNTER — HOSPITAL ENCOUNTER (OUTPATIENT)
Dept: BEHAVIORAL HEALTH | Facility: CLINIC | Age: 46
End: 2021-10-21
Attending: PSYCHIATRY & NEUROLOGY
Payer: MEDICARE

## 2021-10-21 PROCEDURE — 90853 GROUP PSYCHOTHERAPY: CPT | Mod: PO

## 2021-10-21 PROCEDURE — 90853 GROUP PSYCHOTHERAPY: CPT | Mod: GT

## 2021-10-21 PROCEDURE — 99214 OFFICE O/P EST MOD 30 MIN: CPT | Mod: 95 | Performed by: PSYCHIATRY & NEUROLOGY

## 2021-10-21 NOTE — GROUP NOTE
Process Group Note    PATIENT'S NAME: Joel Pineda  MRN:   [id number]  :   1975  ACCT. NUMBER: [id number]  DATE OF SERVICE: 10/21/21  START TIME:  2:00 PM  END TIME:  2:50 PM  FACILITATOR: Amy Ferro MARGARITO; Magali Dodson [facility]  TOPIC:  Process Group    Diagnoses:  296.33 (F33.2) Major Depressive Disorder, Recurrent Episode, Severe _.  4. Other Diagnoses that is relevant to services:   300.00 (F41.9) Unspecified Anxiety Disorder  301.83 (F60.3) Borderline Personality Disorder.         [facility] Mental Health Day Treatment  TRACK: 4B    NUMBER OF PARTICIPANTS:                                       Service Modality:  Video Visit     Telemedicine Visit: The patient's condition can be safely assessed and treated via synchronous audio and visual telemedicine encounter.      Reason for Telemedicine Visit: Services only offered telehealth    Originating Site (Patient Location): Patient's home    Distant Site (Provider Location): Provider Remote Setting- Home Office    Consent:  The patient/guardian has verbally consented to: the potential risks and benefits of telemedicine (video visit) versus in person care; bill my insurance or make self-payment for services provided; and responsibility for payment of non-covered services.     Patient would like the video invitation sent by:  My Chart    Mode of Communication:  Video Conference via Medical Zoom    As the provider I attest to compliance with applicable laws and regulations related to telemedicine.                Data:    Session content: At the start of this group, patients were invited to check in by identifying themselves, describing their current emotional status, and identifying issues to address in this group.   Area(s) of treatment focus addressed in this session included Symptom Management, Personal Safety and Community Resources/Discharge Planning.  Client reported increased feelings of hopelessness yesterday due to decreased  productivity.   Yesterday was exceptionally slow .  Client reported using mindfulness techniques to remind himself that his feelings were temporary.  Client reported on his recent meeting with his psychiatrist, noting that his psychiatrist recommended the development of hobbies to occupy his time.  Writer encouraged client to brainstorm activities he once enjoyed or would like to try.  Client listed drawing, reading [employer] geographic magazines and playing games on his phone as potential ideas.  Client did not report any suicidal ideation, plan or intent.      Therapeutic Interventions/Treatment Strategies:  Psychotherapist offered support, feedback and validation and reinforced use of skills. Treatment modalities used include Cognitive Behavioral Therapy. Interventions include Behavioral Activation: Encouraged strategies to reduce individual procrastination and increase motivation by increasing goal-directed activities to enhance mood and reduce symptoms..    Assessment:    Patient response:   Patient responded to session by accepting feedback, giving feedback and listening    Possible barriers to participation / learning include: and no barriers identified    Health Issues:   None reported       Substance Use Review:   Substance Use: No active concerns identified.    Mental Status/Behavioral Observations  Appearance:   Appropriate   Eye Contact:   Good   Psychomotor Behavior: Normal   Attitude:   Cooperative  Interested Pleasant  Orientation:   All  Speech   Rate / Production: Normal    Volume:  Normal   Mood:    Depressed   Affect:    Appropriate   Thought Content:   Clear  Thought Form:  Coherent  Logical     Insight:    Good     Plan:     Safety Plan: No current safety concerns identified.  Recommended that patient call 911 or go to the local ED should there be a change in any of these risk factors.     Barriers to treatment: None identified    Patient Contracts (see media tab):  None    Substance Use: Not  addressed in session     Continue or Discharge: Patient will continue in Adult Day Treatment (ADT)  as planned. Patient is likely to benefit from learning and using skills as they work toward the goals identified in their treatment plan.      ABHIJEET Salas  October 21, 2021

## 2021-10-21 NOTE — GROUP NOTE
Psychotherapy Group Note    PATIENT'S NAME: Joel Pineda  MRN:   [id number]  :   1975  ACCT. NUMBER: [id number]  DATE OF SERVICE: 10/21/21  START TIME:  3:00 PM  END TIME:  3:50 PM  FACILITATOR: Lashell Leonard  TOPIC: MH EBP Group: Coping Skills  [facility] Adult Mental Health Day Treatment  TRACK: 4B                                      Service Modality:  Video Visit     Telemedicine Visit: The patient's condition can be safely assessed and treated via synchronous audio and visual telemedicine encounter.      Reason for Telemedicine Visit:  covid 19     Originating Site (Patient Location): Patient's home    Distant Site (Provider Location): Provider Remote Setting- Home Office    Consent:  The patient/guardian has verbally consented to: the potential risks and benefits of telemedicine (video visit) versus in person care; bill my insurance or make self-payment for services provided; and responsibility for payment of non-covered services.     Patient would like the video invitation sent by:  My Chart    Mode of Communication:  Video Conference via Medical Zoom    As the provider I attest to compliance with applicable laws and regulations related to telemedicine.          NUMBER OF PARTICIPANTS: 5    Summary of Group / Topics Discussed:  Coping Skills: Building Positive Experiences: Patients discussed the importance of planning and engaging in positive experiences, as strategies to increase positive thinking, hope, and self-worth.  Explored the benefits of planning / creating positive experiences, including recognizing and reducing negativity bias by focusing on and building positive experiences.   Several approaches to building positive experiences were presented and discussed relevant to each patient.      Patient Session Goals / Objectives:    Understand the purpose of planning / creating / participating / sharing in positive experiences.    Explore patient s experiences related to negative  thinking and how it influences activities and moodIdentify current positive events in patient s life.     Set goals to increase a variety of positive experiences.    Address barriers to planning / engaging in positive experiences.        Patient Participation / Response:  Fully participated with the group by sharing personal reflections / insights and openly received / provided feedback with other participants.    Demonstrated understanding of topics discussed through group discussion and participation, Expressed understanding of the relevance / importance of coping skills at distressing times in life and Demonstrated knowledge of when to consider using a variety of coping skills in daily life    Treatment Plan:  Patient has a current master individualized treatment plan.  See Epic treatment plan for more information.    Lashell Leonard

## 2021-10-21 NOTE — PROGRESS NOTES
"[facility]   Adult Mental Health Outpatient Programs  Provider Interval History Note    Program: Adult Day Treatment     PATIENT'S NAME: Joel Pineda  MRN:   [id number]  :   1975  ACCT. NUMBER: [id number]  DATE OF SERVICE: 10/21/21  CALL/VIDEO START TIME: 1359  CALL/VIDEO END TIME: 1418      Interval History:  \"I'm all right.\" Tito presents for today for follow-up and ongoing program supervision. Endorses recent workup for right lower extremity lack of proprioception, still waiting on results from recent EMG. Increased pregabalin due to increased pain, trying to use less oxycodone. Is exploring scheduling muscle relaxants rather than taking as needed.    Meeting with outpatient psychiatrist next week, plans to explore increase lurasidone to 60 mg, used a metaphor of \"trying on shoes:\" See if there is more efficacy to be had at 60 mg without increasing adverse effects.    Reactions/thoughts about program:    \"Fine... so far so good\"    Some transitions in group now, feels it is a challenge but he is navigating it well    Symptoms:    stable    Substance use:    Does not endorse      Risk Assessment:    Suicidal ideation: denies current or recent suicidal ideation or behavior    Thoughts of non-suicidal self-injury: denied    Recent self-injurious behavior: denied    Homicidal ideation: denied    Other safety concerns: denied      Medications:  Medications reviewed and updated in [facility] with patient today. Patient is taking medications as prescribed.     Adverse effects: No significant side effects      Laboratory Results:  Most recent labs reviewed and no new labs.     Metrics:  PHQ-9 scores:   PHQ-9 SCORE 2021   PHQ-9 Total Score - - - -   PHQ-9 Total Score MyChart 13 (Moderate depression) 15 (Moderately severe depression) 12 (Moderate depression) 16 (Moderately severe depression)   PHQ-9 Total Score 13 15 12 16   PHQ-9 Total Score - - - " "-       YUDI-7 scores:   YUDI-7 SCORE 7/19/2021 7/20/2021 9/1/2021   Total Score - - -   Total Score 9 (mild anxiety) 9 (mild anxiety) 14 (moderate anxiety)   Total Score 9 9 14   Total Score BEH Adult - - -       CSSR-S: No flowsheet data found.      Mental Status Examination (limited due to video virtual visit format):  Vital Signs: There were no vitals taken for this virtual visit.  Appearance: well groomed, appears stated age, and in no apparent distress.  Attitude: cooperative   Eye Contact: good to the extent that can be determined in a video visit  Muscle Strength and Tone: no gross abnormalities based on remote observation  Psychomotor Behavior:  no evidence of tardive dyskinesia, dystonia, or tics based on remote observation  Gait and Station: normal, no gross abnormalities based on remote observation  Speech: clear, coherent, normal prosody, regular rate, regular rhythm and fluent  Associations: No loosening of associations  Thought Process: coherent and goal directed  Thought Content: no evidence of suicidal ideation or homicidal ideation, no evidence of psychotic thought, no auditory hallucinations present and no visual hallucinations present  Mood: \"better\"  Affect: mood congruent, intensity is blunted, constricted mobility, restricted range and reactive  Insight: good  Judgment: intact, adequate for safety  Impulse Control: intact  Oriented to: time, place, person and situation  Attention Span and Concentration: normal  Language: Intact  Recent and Remote Memory: intact to interview. Not formally assessed. No amnesia.  Fund of Knowledge: appropriate        Diagnosis/es:  296.89 Bipolar II Disorder Depressed and moderate  300.02 (F41.1) Generalized Anxiety Disorder  301.83 (F60.3) Borderline Personality Disorder    Assessment:  Tito presents today for follow up. Endorses stability overall, continues to make small medication adjustments to further optimize treatment.  Is working with his outpatient " psychiatrist and other providers.      Bipolar disorder, depressed  o Overall stable   o Still symptomatic  o Agree with exploring an increase in lurasidone, but will defer to his outpatient provider      Generalized anxiety disorder  o Overall stable   o Probably best addressed in psychotherapy at this point  o No medication changes today, again defer to outpatient provider      Borderline personality disorder  o Overall stable   o Making good use of DBT skills, continues to explore those  o Continues to participate in therapy  o No change in management at this time      Programmatic care  o Continuing to benefit from group  o Continuing to develop skills  o Continues to meet criteria for programmatic care at this level of treatment    I feel this patient does not meet criteria for an involuntary hold and is appropriate for treatment at an outpatient level of care.      Treatment Plan:  Medications:    Continue:   o Clonazepam 0.25 mg twice a day as needed (taking approximately once daily)  o Duloxetine 120 mg by mouth daily  o Eszopiclone 3 mg by mouth daily at bedtime  o Lurasidone 40 mg by mouth    Continue all other medications as reviewed per electronic medical record today    Continue therapy as planned:    Enrolled in Adult Day Treatment     Continue with individual therapist as appropriate    Safety plan reviewed:    To the Emergency Department as needed or call after hours crisis line at [phone] or [phone]. [place] Crisis Text Line: Text [place] to 702325 or Suicide LifeLine Chat: suicidepreventionlifeline.org/chat    Follow-up:     schedule an appointment with me or another program provider in approximately 4 weeks or sooner if needed.  Call [facility] at [phone] to schedule.    Follow up with outpatient provider as planned or sooner if needed for acute medical concerns.    Questions or concerns:    Call the psychiatric nurse line with medication questions or concerns at  [phone].    Frodio may be used to communicate with your provider, but this is not intended to be used for emergencies.        Treatment Objective(s) Addressed in This Session:  The purpose of today's virtual visit is for this writer to provide oversight of patient's care while receiving program services. Specific treatment goals addressed included personal safety, symptoms stabilization and management, wellness and mental health, and community resources/discharge planning.     This author or another program provider will follow up with the patient as noted above.     Patient continues to meet criteria for recommended level of care: Adult Day Treatment   Patient would be at reasonable risk of requiring a higher level of care in the absence of current services.    Patient agrees with the current plan of care.    Joel Tan MD  10/21/21      Visit Details:  Type of service:  Video Visit    Start/End Time: see above    Originating Location (pt. Location): Home in [place]    Distant Location (provider location): Provider Remote Setting- Home Office    Platform used for Video Visit: Well    Physician has received verbal consent for a Video Visit from the patient? Yes    25 minutes spent on the date of the encounter doing chart review, patient visit and documentation     This document completed in part using Dragon Medical One dictation software.  Please excuse any inadvertent word or phrase substitutions.

## 2021-10-21 NOTE — GROUP NOTE
Psychotherapy Group Note    PATIENT'S NAME: Joel Pineda  MRN:   [id number]  :   1975  ACCT. NUMBER: [id number]  DATE OF SERVICE: 10/21/21  START TIME:  1:00 PM  END TIME:  1:50 PM  FACILITATOR: Amy Ferro LGSW  TOPIC: MH EBP Group: Coping Skills  [facility] Adult Mental Health Day Treatment  TRACK: 4B    NUMBER OF PARTICIPANTS: 6    Summary of Group / Topics Discussed:  Coping Skills: Additional Coping Skills:  Patients discussed and practiced humor as a coping skill.  Reviewed the benefits of applying the aforementioned coping strategies.  Patients explored how these strategies might be applied to daily stressors or distressing situations.    Patient Session Goals / Objectives:    Understand the purpose and benefits of applying humor and laughter coping strategies    Discuss the ways mindfulness and humor are connected.    Talk about humor as a coping skill vs. Humor as a defense mechanism    Address barriers to utilizing coping skills when in distress.                                      Service Modality:  Video Visit     Telemedicine Visit: The patient's condition can be safely assessed and treated via synchronous audio and visual telemedicine encounter.      Reason for Telemedicine Visit: Services only offered telehealth    Originating Site (Patient Location): Patient's home    Distant Site (Provider Location): Provider Remote Setting- Home Office    Consent:  The patient/guardian has verbally consented to: the potential risks and benefits of telemedicine (video visit) versus in person care; bill my insurance or make self-payment for services provided; and responsibility for payment of non-covered services.     Patient would like the video invitation sent by:  My Chart    Mode of Communication:  Video Conference via Medical Zoom    As the provider I attest to compliance with applicable laws and regulations related to telemedicine.            Patient Participation / Response:  Moderately  participated, sharing some personal reflections / insights and adequately adequately received / provided feedback with other participants.    Demonstrated understanding of topics discussed through group discussion and participation, Expressed understanding of the relevance / importance of coping skills at distressing times in life and Demonstrated knowledge of when to consider using a variety of coping skills in daily life    Treatment Plan:  Patient has a current master individualized treatment plan.  See Epic treatment plan for more information.    Amy Ferro LGSW

## 2021-10-22 ENCOUNTER — TELEPHONE (OUTPATIENT)
Dept: RHEUMATOLOGY | Facility: CLINIC | Age: 46
End: 2021-10-22

## 2021-10-22 DIAGNOSIS — M45.9 ANKYLOSING SPONDYLITIS, UNSPECIFIED SITE OF SPINE (H): ICD-10-CM

## 2021-10-22 NOTE — TELEPHONE ENCOUNTER
Sandy states they got a fax that was blurry, all they can read was a date on the cover sheet of 10/19/21 and just said [facility].    They would what what was sent over, resent to them.  Please call [employer] back at [phone] with any questions.

## 2021-10-25 ENCOUNTER — HOSPITAL ENCOUNTER (OUTPATIENT)
Dept: BEHAVIORAL HEALTH | Facility: CLINIC | Age: 46
End: 2021-10-25
Attending: PSYCHIATRY & NEUROLOGY
Payer: MEDICARE

## 2021-10-25 PROCEDURE — 90853 GROUP PSYCHOTHERAPY: CPT | Mod: PO | Performed by: SOCIAL WORKER

## 2021-10-25 PROCEDURE — 90853 GROUP PSYCHOTHERAPY: CPT | Mod: PO | Performed by: COUNSELOR

## 2021-10-25 PROCEDURE — 90853 GROUP PSYCHOTHERAPY: CPT | Mod: PO

## 2021-10-25 RX ORDER — MEDROXYPROGESTERONE ACETATE 150 MG/ML
50 INJECTION, SUSPENSION INTRAMUSCULAR WEEKLY
Qty: 4 ML | Refills: 7 | Status: SHIPPED | OUTPATIENT
Start: 2021-10-25 | End: 2022-04-20

## 2021-10-25 NOTE — TELEPHONE ENCOUNTER
Neither patient nor liaison sent anything to [employer]. We did E-Scribe an RX that day so that must be what they are referring to.     Please resend

## 2021-10-25 NOTE — GROUP NOTE
Process Group Note    PATIENT'S NAME: Joel Pineda  MRN:   [id number]  :   1975  ACCT. NUMBER: [id number]  DATE OF SERVICE: 10/25/21  START TIME:  1:00 PM  END TIME:  1:50 PM  FACILITATOR: Magali Dodson [facility]  TOPIC:  Process Group    Diagnoses:  296.33 (F33.2) Major Depressive Disorder, Recurrent Episode, Severe _.  4. Other Diagnoses that is relevant to services:   300.00 (F41.9) Unspecified Anxiety Disorder  301.83 (F60.3) Borderline Personality Disorder.      [facility] Mental Health Day Treatment  TRACK: 4B    NUMBER OF PARTICIPANTS: 6                                      Service Modality:  Video Visit     Telemedicine Visit: The patient's condition can be safely assessed and treated via synchronous audio and visual telemedicine encounter.      Reason for Telemedicine Visit: Services only offered telehealth    Originating Site (Patient Location): Patient's home    Distant Site (Provider Location): Provider Remote Setting- Home Office    Consent:  The patient/guardian has verbally consented to: the potential risks and benefits of telemedicine (video visit) versus in person care; bill my insurance or make self-payment for services provided; and responsibility for payment of non-covered services.     Patient would like the video invitation sent by:  My Chart    Mode of Communication:  Video Conference via Medical Zoom    As the provider I attest to compliance with applicable laws and regulations related to telemedicine.               Data:    Session content: At the start of this group, patients were invited to check in by identifying themselves, describing their current emotional status, and identifying issues to address in this group.   Area(s) of treatment focus addressed in this session included Symptom Management, Personal Safety and Community Resources/Discharge Planning.  Tito reported that he had fear of fall and winter weather.  He stated that he  had a low key weekend and  "tried to watch comedy shows that had been recommended to him.  He stated that he enjoyed spending time with his kittens.  He stated that he is planning to resume individual therapy.  Client denied suicidal ideation, intent and plan.     Therapeutic Interventions/Treatment Strategies:  Psychotherapist offered support, feedback and validation and reinforced use of skills. Treatment modalities used include Cognitive Behavioral Therapy. Interventions include Cognitive Restructuring:  Facilitated recognition of the connection between negative thoughts and negative core beliefs and Coping Skills: Discussed how the use of intentional \"in the moment\" actions can help reduce distress.    Assessment:    Patient response:   Patient responded to session by focusing on goals, being attentive and accepting support    Possible barriers to participation / learning include: and no barriers identified    Health Issues:   None reported       Substance Use Review:   Substance Use: No active concerns identified.    Mental Status/Behavioral Observations  Appearance:   Appropriate   Eye Contact:   Good   Psychomotor Behavior: Normal   Attitude:   Cooperative   Orientation:   All  Speech   Rate / Production: Normal    Volume:  Normal   Mood:    Anxious  Depressed   Affect:    Appropriate   Thought Content:   Clear  Thought Form:  Coherent  Logical     Insight:    Good     Plan:     Safety Plan: No current safety concerns identified.  Recommended that patient call 911 or go to the local ED should there be a change in any of these risk factors.     Barriers to treatment: None identified    Patient Contracts (see media tab):  None    Substance Use: Not addressed in session     Continue or Discharge: Patient will continue in Adult Day Treatment (ADT)  as planned. Patient is likely to benefit from learning and using skills as they work toward the goals identified in their treatment plan.      Magali Dodson, [facility]  October 25, 2021    "

## 2021-10-25 NOTE — GROUP NOTE
Psychotherapy Group Note    PATIENT'S NAME: Joel Pineda  MRN:   [id number]  :   1975  ACCT. NUMBER: [id number]  DATE OF SERVICE: 10/25/21  START TIME:  3:00 PM  END TIME:  3:50 PM  FACILITATOR: Lashell eLonard  TOPIC: MH EBP Group: Self-Awareness  [facility] Adult [facility] Day Treatment  TRACK: 4B                                      Service Modality:  Video Visit     Telemedicine Visit: The patient's condition can be safely assessed and treated via synchronous audio and visual telemedicine encounter.      Reason for Telemedicine Visit:  covid 19    Originating Site (Patient Location): Patient's home    Distant Site (Provider Location): Provider Remote Setting- Home Office    Consent:  The patient/guardian has verbally consented to: the potential risks and benefits of telemedicine (video visit) versus in person care; bill my insurance or make self-payment for services provided; and responsibility for payment of non-covered services.     Patient would like the video invitation sent by:  My Chart    Mode of Communication:  Video Conference via Medical Zoom    As the provider I attest to compliance with applicable laws and regulations related to telemedicine.          NUMBER OF PARTICIPANTS: 6    Summary of Group / Topics Discussed:  Self-Awareness: Gratitude: Topic focused on assisting patients in identifying key concepts in gratitude. Patients discussed the benefits of practicing gratitude and its impact on mood improvement, mindfulness, and perspective. Patients worked to increase time spent on recognition and appreciation of what is positive and working in their lives. Patients discussed the concepts and benefits of feeling grateful. The goal is to reduce rumination and negative thinking resulting in increased mindfulness and resilience. Patients specifically discussed how they can practice and problem solve barriers to daily gratitude practice.     Patient Session Goals /  Objectives:    [place] the concept and benefits of gratitude    Identified ways to practice gratitude in daily life    Problem solved barriers to practicing gratitude      Patient Participation / Response:  Fully participated with the group by sharing personal reflections / insights and openly received / provided feedback with other participants.    Demonstrated understanding of topics discussed through group discussion and participation, Demonstrated understanding of values, strengths, and challenges to learn about themselves and Identified / Expressed readiness to act intentionally, increase self-compassion, promote personal growth    Treatment Plan:  Patient has a current master individualized treatment plan.  See Epic treatment plan for more information.    Lashell Leonard

## 2021-10-25 NOTE — GROUP NOTE
Psychotherapy Group Note    PATIENT'S NAME: Joel Pineda  MRN:   [id number]  :   1975  ACCT. NUMBER: [id number]  DATE OF SERVICE: 10/25/21  START TIME:  2:00 PM  END TIME:  2:50 PM  FACILITATOR: Morenita Lau LPCC  TOPIC: MH EBP Group: Coping Skills  [facility] Adult Mental Health Day Treatment  TRACK: 4B    NUMBER OF PARTICIPANTS: 6    Summary of Group / Topics Discussed:  Coping Skills: Grounding: Patients discussed and practiced strategies to increase attachment / presence to the current moment.  Patients identified situations in which using these strategies will help improve emotion regulation sense of calm in the body.  Reviewed the benefits of applying grounding strategies, as well as past / current practices of each member.  Patients identified situations in which using these strategies would reduce stress. They developed the ability to distinguish when these strategies can be useful in their lives for management and stress and psychological well-being.    Patient Session Goals / Objectives:    Understand the purpose of using grounding strategies to reduce stress.    Verbalize understanding of how and when to apply grounding strategies to reduce distress and increase presence in the moment.    Review patients current grounding practices and discuss a more formal way of practicing and accessing skills.    Practice using various calming strategies (e.g. 5-4-3-2-1; mental and body awareness).    Choose 1-2 grounding strategies to apply during times of distress.                                      Service Modality:  Video Visit     Telemedicine Visit: The patient's condition can be safely assessed and treated via synchronous audio and visual telemedicine encounter.      Reason for Telemedicine Visit: Services only offered telehealth    Originating Site (Patient Location): Patient's home    Distant Site (Provider Location): Provider Remote Setting- Home Office    Consent:  The patient/guardian  has verbally consented to: the potential risks and benefits of telemedicine (video visit) versus in person care; bill my insurance or make self-payment for services provided; and responsibility for payment of non-covered services.     Patient would like the video invitation sent by:  My Chart    Mode of Communication:  Video Conference via Medical Zoom    As the provider I attest to compliance with applicable laws and regulations related to telemedicine.            Patient Participation / Response:  Fully participated with the group by sharing personal reflections / insights and openly received / provided feedback with other participants.    Demonstrated understanding of topics discussed through group discussion and participation, Expressed understanding of the relevance / importance of coping skills at distressing times in life and Demonstrated knowledge of when to consider using a variety of coping skills in daily life    Treatment Plan:  Patient has a current master individualized treatment plan.  See Epic treatment plan for more information.    Morenita Lau, [facility]

## 2021-10-26 ENCOUNTER — HOSPITAL ENCOUNTER (OUTPATIENT)
Dept: BEHAVIORAL HEALTH | Facility: CLINIC | Age: 46
End: 2021-10-26
Attending: PSYCHIATRY & NEUROLOGY
Payer: MEDICARE

## 2021-10-26 PROCEDURE — 90853 GROUP PSYCHOTHERAPY: CPT | Mod: GT

## 2021-10-26 PROCEDURE — 90853 GROUP PSYCHOTHERAPY: CPT | Mod: PO

## 2021-10-26 NOTE — GROUP NOTE
Psychotherapy Group Note    PATIENT'S NAME: Joel Pineda  MRN:   [id number]  :   1975  ACCT. NUMBER: [id number]  DATE OF SERVICE: 10/26/21  START TIME:  3:00 PM  END TIME:  3:50 PM  FACILITATOR: Lashell Leonard  TOPIC: MH EBP Group: Coping Skills  [facility] Adult Mental Health Day Treatment  TRACK: 4B                                      Service Modality:  Video Visit     Telemedicine Visit: The patient's condition can be safely assessed and treated via synchronous audio and visual telemedicine encounter.      Reason for Telemedicine Visit:  covid 19     Originating Site (Patient Location): Patient's home    Distant Site (Provider Location): Provider Remote Setting- Home Office    Consent:  The patient/guardian has verbally consented to: the potential risks and benefits of telemedicine (video visit) versus in person care; bill my insurance or make self-payment for services provided; and responsibility for payment of non-covered services.     Patient would like the video invitation sent by:  My Chart    Mode of Communication:  Video Conference via Medical Zoom    As the provider I attest to compliance with applicable laws and regulations related to telemedicine.          NUMBER OF PARTICIPANTS: 6    Summary of Group / Topics Discussed:  Coping Skills: Improve the Moment: Patients learned to tolerate distress, by applying strategies to effect positive change in the present moment.  Patients will identified situations where they would benefit from applying strategies to improve the moment and reduce distress. Patients discussed how to distinguish when this can be useful in their lives or when other strategies would be more relevant or helpful.    Patient Session Goals / Objectives:    Discuss how the use of intentional  in the moment  actions can help reduce distress.    Review patients current practices and discuss a more formal way of practicing and accessing skills.    Increase ability to decide  when to use improve the moment strategies    Choose 1-2 in the moment actions to apply during times of distress.        Patient Participation / Response:  Fully participated with the group by sharing personal reflections / insights and openly received / provided feedback with other participants.    Demonstrated understanding of topics discussed through group discussion and participation, Expressed understanding of the relevance / importance of coping skills at distressing times in life and Demonstrated knowledge of when to consider using a variety of coping skills in daily life    Treatment Plan:  Patient has a current master individualized treatment plan.  See Epic treatment plan for more information.    Lashell Leonard

## 2021-10-26 NOTE — GROUP NOTE
Psychotherapy Group Note    PATIENT'S NAME: Joel Pineda  MRN:   [id number]  :   1975  ACCT. NUMBER: [id number]  DATE OF SERVICE: 10/26/21  START TIME:  2:00 PM  END TIME:  2:50 PM  FACILITATOR: Lashell Leonard  TOPIC: MH EBP Group: Coping Skills  [facility] Adult Mental Health Day Treatment  TRACK: 4B                                      Service Modality:  Video Visit     Telemedicine Visit: The patient's condition can be safely assessed and treated via synchronous audio and visual telemedicine encounter.      Reason for Telemedicine Visit: Services only offered telehealth    Originating Site (Patient Location): Patient's home    Distant Site (Provider Location): Provider Remote Setting- Home Office    Consent:  The patient/guardian has verbally consented to: the potential risks and benefits of telemedicine (video visit) versus in person care; bill my insurance or make self-payment for services provided; and responsibility for payment of non-covered services.     Patient would like the video invitation sent by:  My Chart    Mode of Communication:  Video Conference via Medical Zoom    As the provider I attest to compliance with applicable laws and regulations related to telemedicine.          NUMBER OF PARTICIPANTS: 6    Summary of Group / Topics Discussed:  Coping Skills: Improve the Moment: Patients learned to tolerate distress, by applying strategies to effect positive change in the present moment.  Patients will identified situations where they would benefit from applying strategies to improve the moment and reduce distress. Patients discussed how to distinguish when this can be useful in their lives or when other strategies would be more relevant or helpful.    Patient Session Goals / Objectives:    Discuss how the use of intentional  in the moment  actions can help reduce distress.    Review patients current practices and discuss a more formal way of practicing and accessing  skills.    Increase ability to decide when to use improve the moment strategies    Choose 1-2 in the moment actions to apply during times of distress.        Patient Participation / Response:  Fully participated with the group by sharing personal reflections / insights and openly received / provided feedback with other participants.    Demonstrated understanding of topics discussed through group discussion and participation, Expressed understanding of the relevance / importance of coping skills at distressing times in life and Demonstrated knowledge of when to consider using a variety of coping skills in daily life    Treatment Plan:  Patient has a current master individualized treatment plan.  See Epic treatment plan for more information.    Lashell Leonard

## 2021-10-26 NOTE — GROUP NOTE
Process Group Note    PATIENT'S NAME: Joel Pineda  MRN:   [id number]  :   1975  ACCT. NUMBER: [id number]  DATE OF SERVICE: 10/26/21  START TIME:  1:00 PM  END TIME:  1:50 PM  FACILITATOR: Amy Ferro MARGARITO; Magali [facility]  TOPIC:  Process Group    Diagnoses:  296.33 (F33.2) Major Depressive Disorder, Recurrent Episode, Severe _.  4. Other Diagnoses that is relevant to services:   300.00 (F41.9) Unspecified Anxiety Disorder  301.83 (F60.3) Borderline Personality Disorder.         [facility] Mental Health Day Treatment  TRACK: 4B    NUMBER OF PARTICIPANTS: 6                                      Service Modality:  Video Visit     Telemedicine Visit: The patient's condition can be safely assessed and treated via synchronous audio and visual telemedicine encounter.      Reason for Telemedicine Visit: Services only offered telehealth    Originating Site (Patient Location): Patient's home    Distant Site (Provider Location): Provider Remote Setting- Home Office    Consent:  The patient/guardian has verbally consented to: the potential risks and benefits of telemedicine (video visit) versus in person care; bill my insurance or make self-payment for services provided; and responsibility for payment of non-covered services.     Patient would like the video invitation sent by:  My Chart    Mode of Communication:  Video Conference via Medical Zoom    As the provider I attest to compliance with applicable laws and regulations related to telemedicine.                Data:    Session content: At the start of this group, patients were invited to check in by identifying themselves, describing their current emotional status, and identifying issues to address in this group.   Area(s) of treatment focus addressed in this session included Symptom Management, Personal Safety and Community Resources/Discharge Planning.  Client reported a  positively affected mood  and relief after driving for the first  time since the incident where he could not locate the brake pedal.  Client reported feeling extreme anxiety and panic, including increased perspiration while driving to a physical therapy appointment this morning.  Client explained that he was able to successfully use his left foot for the brake pedal, despite his need to  work on finesse .   Writer asked client what helped him successfully complete the drive.  Client reported using mindfulness to focus on the logistics of driving.   Client did not report any suicidal ideation, plan or intent.    Therapeutic Interventions/Treatment Strategies:  Psychotherapist offered support, feedback and validation and reinforced use of skills. Treatment modalities used include Cognitive Behavioral Therapy. Interventions include Mindfulness: Facilitated discussion of when/how to use mindfulness skills to benefit general health, mental health symptoms, and stressors and Symptoms Management: Promoted understanding of their diagnoses and how it impacts their functioning.    Assessment:    Patient response:   Patient responded to session by accepting feedback, giving feedback and listening    Possible barriers to participation / learning include: and no barriers identified    Health Issues:   Yes: Proprioception Impairment, Associated Psychological Distress       Substance Use Review:   Substance Use: No active concerns identified.    Mental Status/Behavioral Observations  Appearance:   Appropriate   Eye Contact:   Good   Psychomotor Behavior: Normal   Attitude:   Cooperative  Interested Pleasant  Orientation:   All  Speech   Rate / Production: Normal    Volume:  Normal   Mood:    Anxious  Depressed   Affect:    Appropriate   Thought Content:   Clear and Safety denies any current safety concerns including suicidal ideation, self-harm, and homicidal ideation  Thought Form:  Coherent  Logical     Insight:    Good     Plan:     Safety Plan: No current safety concerns identified.   Recommended that patient call 911 or go to the local ED should there be a change in any of these risk factors.     Barriers to treatment: None identified    Patient Contracts (see media tab):  None    Substance Use: Not addressed in session     Continue or Discharge: Patient will continue in Adult Day Treatment (ADT)  as planned. Patient is likely to benefit from learning and using skills as they work toward the goals identified in their treatment plan.      Amy Ferro, ABHIJEET  October 26, 2021

## 2021-10-27 ENCOUNTER — TELEPHONE (OUTPATIENT)
Dept: FAMILY MEDICINE | Facility: CLINIC | Age: 46
End: 2021-10-27

## 2021-10-28 ENCOUNTER — HOSPITAL ENCOUNTER (OUTPATIENT)
Dept: BEHAVIORAL HEALTH | Facility: CLINIC | Age: 46
End: 2021-10-28
Attending: PSYCHIATRY & NEUROLOGY
Payer: MEDICARE

## 2021-10-28 PROCEDURE — 90853 GROUP PSYCHOTHERAPY: CPT | Mod: GT

## 2021-10-28 PROCEDURE — 90853 GROUP PSYCHOTHERAPY: CPT | Mod: PO

## 2021-10-28 NOTE — GROUP NOTE
Psychoeducation Group Note    PATIENT'S NAME: Joel Pineda  MRN:   [id number]  :   1975  ACCT. NUMBER: [id number]  DATE OF SERVICE: 10/28/21  START TIME:  3:00 PM  END TIME:  3:50 PM  FACILITATOR: Lashell Leonard  TOPIC: MH RN Group: Health Maintenance  [facility] Mental Health Day Treatment  TRACK:4B                                      Service Modality:  Video Visit     Telemedicine Visit: The patient's condition can be safely assessed and treated via synchronous audio and visual telemedicine encounter.      Reason for Telemedicine Visit: Services only offered telehealth    Originating Site (Patient Location): Patient's home    Distant Site (Provider Location): Provider Remote Setting- Home Office    Consent:  The patient/guardian has verbally consented to: the potential risks and benefits of telemedicine (video visit) versus in person care; bill my insurance or make self-payment for services provided; and responsibility for payment of non-covered services.     Patient would like the video invitation sent by:  My Chart    Mode of Communication:  Video Conference via Medical Zoom    As the provider I attest to compliance with applicable laws and regulations related to telemedicine.          NUMBER OF PARTICIPANTS: 7    Summary of Group / Topics Discussed:  Health Maintenance: Weekend planning: Patients were given time to complete a weekend plan of what they will do to promote wellness and sobriety over the weekend when they do not have the structure of group. Patients were encouraged to review progress on their treatment goals and were challenged to identify ways to work toward meeting them. Patients identified and discussed foreseeable barriers to success over the weekend and then developed a plan to overcome them. Patients reviewed their distress coping skills and social support network and discussed this with the group.       Patient Session Goals / Objectives:    ?    Identified  activities to engage in that promote balance in wellness  ?    Distinguished possible barriers to success over the weekend and created a plan to overcome them  ?    Listed distress coping skills and identified social support network to utilize if in crisis during the weekend          Patient Participation / Response:  Fully participated with the group by sharing personal reflections / insights and openly received / provided feedback with other participants.    Demonstrated understanding of topics discussed through group discussion and participation, Identified / Expressed personal readiness to practice skills and Verbalized understanding of health maintenance topic    Treatment Plan:  Patient has a current master individualized treatment plan.  See Epic treatment plan for more information.    Lashell Leonard

## 2021-10-28 NOTE — GROUP NOTE
Process Group Note    PATIENT'S NAME: Joel Pineda  MRN:   [id number]  :   1975  ACCT. NUMBER: [id number]  DATE OF SERVICE: 10/28/21  START TIME:  2:00 PM  END TIME:  2:50 PM  FACILITATOR: Amy Ferro LGSW; Magali Dodson [facility]  TOPIC:  Process Group    Diagnoses:  296.33 (F33.2) Major Depressive Disorder, Recurrent Episode, Severe _.  4. Other Diagnoses that is relevant to services:   300.00 (F41.9) Unspecified Anxiety Disorder  301.83 (F60.3) Borderline Personality Disorder.         [facility] Mental Health Day Treatment  TRACK: 4B    NUMBER OF PARTICIPANTS: 7                                      Service Modality:  Video Visit     Telemedicine Visit: The patient's condition can be safely assessed and treated via synchronous audio and visual telemedicine encounter.      Reason for Telemedicine Visit: Services only offered telehealth    Originating Site (Patient Location): Patient's home    Distant Site (Provider Location): Provider Remote Setting- Home Office    Consent:  The patient/guardian has verbally consented to: the potential risks and benefits of telemedicine (video visit) versus in person care; bill my insurance or make self-payment for services provided; and responsibility for payment of non-covered services.     Patient would like the video invitation sent by:  My Chart    Mode of Communication:  Video Conference via Medical Zoom    As the provider I attest to compliance with applicable laws and regulations related to telemedicine.                Data:    Session content: At the start of this group, patients were invited to check in by identifying themselves, describing their current emotional status, and identifying issues to address in this group.   Area(s) of treatment focus addressed in this session included Symptom Management, Personal Safety and Community Resources/Discharge Planning.  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   I leave the review of board documents to the day before .  Client expressed feeling satisfied with a  concise  email he was able to send yesterday related to the board meeting.   Writer asked client if he had driven since earlier in the week, to which client reported driving yesterday to go grocery shopping and take his cats to the .  Client reported that the drive  went well  and that he experienced decreased feelings of panic.  Client reported plans to do more driving this weekend.  Client acknowledged physical pain he was experiencing related to physical therapy and his hesitancy to take over the counter pain medications.  Clients stated that this morning he decided to take a small dose and that it alleviated some of his pain. Client did not report any suicidal ideation, plan or intent.'    Therapeutic Interventions/Treatment Strategies:  Psychotherapist offered support, feedback and validation and reinforced use of skills. Treatment modalities used include Cognitive Behavioral Therapy. Interventions include Symptoms Management: Promoted understanding of their diagnoses and how it impacts their functioning and Relationship Skills: Encouraged development and maintenance  of healthy boundaries.    Assessment:    Patient response:   Patient responded to session by accepting feedback, giving feedback and listening    Possible barriers to participation / learning include: and no barriers identified    Health Issues:   None reported       Substance Use Review:   Substance Use: No active concerns identified.    Mental Status/Behavioral Observations  Appearance:   Appropriate   Eye Contact:   Good   Psychomotor Behavior: Normal   Attitude:   Cooperative  Interested Friendly Pleasant  Orientation:   All  Speech   Rate / Production: Normal    Volume:  Normal   Mood:    Anxious  Depressed   Affect:    Appropriate   Thought Content:   Clear and Safety denies  any current safety concerns including suicidal ideation, self-harm, and homicidal ideation  Thought Form:  Coherent  Logical     Insight:    Good     Plan:     Safety Plan: No current safety concerns identified.  Recommended that patient call 911 or go to the local ED should there be a change in any of these risk factors.     Barriers to treatment: None identified    Patient Contracts (see media tab):  None    Substance Use: Not addressed in session     Continue or Discharge: Patient will continue in Adult Day Treatment (ADT)  as planned. Patient is likely to benefit from learning and using skills as they work toward the goals identified in their treatment plan.      ABHIJEET Salas  October 28, 2021

## 2021-10-28 NOTE — GROUP NOTE
Psychotherapy Group Note    PATIENT'S NAME: Joel Pineda  MRN:   [id number]  :   1975  ACCT. NUMBER: [id number]  DATE OF SERVICE: 10/28/21  START TIME:  1:00 PM  END TIME:  1:50 PM  FACILITATOR: Amy Ferro LGSW  TOPIC: MH EBP Group: Behavioral Activation  [facility] Mental Health Day Treatment  TRACK: 4B    NUMBER OF PARTICIPANTS: 7    Summary of Group / Topics Discussed:  Behavioral Activation: Motivation and Procrastination: Patients explored how they currently spend their time, identifying thoughts and feelings that are motivating and serve to increase desired behaviors.  They also examined behaviors that contribute to procrastination.  Different types of procrastination behaviors were identified, and strategies to reduce individual procrastination and increase motivation were explored and practiced.  Patients identified ways to increase goal-directed activities to enhance mood and reduce symptoms.        Patient Session Goals / Objectives:    Discuss barriers to decision making and strategies to make decisions that resist crisis urges    Used Distress Tolerance Handout 5 to talk about pros and cons as a strategy to make decisions about behavior.    Identify current patterns of procrastination behavior and how they influence thoughts and moods, and inhibit motivation.    Identify behaviors that can be implemented that contribute to improving thoughts and feelings, motivation, and reduce symptoms.    Identify and develop a plan to increase activities that promote a sense of accomplishment and competence.    Practice scheduling positive activities / behaviors into daily routines.      Patient Participation / Response:  Fully participated with the group by sharing personal reflections / insights and openly received / provided feedback with other participants.    Demonstrated understanding of topics discussed through group discussion and participation and Expressed understanding of the  relationship between behaviors, thoughts, and feelings    Treatment Plan:  Patient has a current master individualized treatment plan.  See Epic treatment plan for more information.    Amy Ferro LGSW

## 2021-10-31 DIAGNOSIS — M45.8 ANKYLOSING SPONDYLITIS OF SACRAL REGION (H): ICD-10-CM

## 2021-10-31 DIAGNOSIS — M51.369 DDD (DEGENERATIVE DISC DISEASE), LUMBAR: ICD-10-CM

## 2021-11-01 ENCOUNTER — HOSPITAL ENCOUNTER (OUTPATIENT)
Dept: BEHAVIORAL HEALTH | Facility: CLINIC | Age: 46
End: 2021-11-01
Attending: PSYCHIATRY & NEUROLOGY
Payer: MEDICARE

## 2021-11-01 PROCEDURE — 90853 GROUP PSYCHOTHERAPY: CPT | Mod: PO | Performed by: SOCIAL WORKER

## 2021-11-01 PROCEDURE — 90853 GROUP PSYCHOTHERAPY: CPT | Mod: GT

## 2021-11-01 NOTE — GROUP NOTE
"Process Group Note    PATIENT'S NAME: Joel Pineda  MRN:   [id number]  :   1975  ACCT. NUMBER: [id number]  DATE OF SERVICE: 21  START TIME:  1:00 PM  END TIME:  1:50 PM  [facility]ACILIT[facility]: [facility]son [facility]  TOPIC:  Process Group    Diagnoses:  296.33 (F33.2) Major Depressive Disorder, Recurrent Episode, Severe _.  4. Other Diagnoses that is relevant to services:   300.00 (F41.9) Unspecified Anxiety Disorder  301.83 (F60.3) Borderline Personality Disorder.      [facility] Mental Health Day Treatment  TRACK: 4B    NUMBER OF PARTICIPANTS: 7                                      Service Modality:  Video Visit     Telemedicine Visit: The patient's condition can be safely assessed and treated via synchronous audio and visual telemedicine encounter.      Reason for Telemedicine Visit: Services only offered telehealth    Originating Site (Patient Location): Patient's home    Distant Site (Provider Location): Provider Remote Setting- Home Office    Consent:  The patient/guardian has verbally consented to: the potential risks and benefits of telemedicine (video visit) versus in person care; bill my insurance or make self-payment for services provided; and responsibility for payment of non-covered services.     Patient would like the video invitation sent by:  My Chart    Mode of Communication:  Video Conference via Medical Zoom    As the provider I attest to compliance with applicable laws and regulations related to telemedicine.               Data:    Session content: At the start of this group, patients were invited to check in by identifying themselves, describing their current emotional status, and identifying issues to address in this group.   Area(s) of treatment focus addressed in this session included Symptom Management, Personal Safety and Community Resources/Discharge Planning.  Tito reported having an \"excuisite amount of pain\" over the weekend.   He considered going to the emergency " room but did not for fear of being seen as medication seeking.  He stated that he has gone inpatient for mental health before when having high levels of pain and gotten good care.  He stated that he used distraction of shows on streaming services and a football game to cope.   He stated the pain is less today.      Therapeutic Interventions/Treatment Strategies:  Psychotherapist offered support, feedback and validation and reinforced use of skills. Treatment modalities used include Cognitive Behavioral Therapy. Interventions include Cognitive Restructuring:  Assisted patient in formulating new neutral/positive alternatives to challenge less helpful / ineffective thoughts.    Assessment:    Patient response:   Patient responded to session by listening and focusing on goals    Possible barriers to participation / learning include: and no barriers identified    Health Issues:   None reported       Substance Use Review:   Substance Use: No active concerns identified.    Mental Status/Behavioral Observations  Appearance:   Appropriate   Eye Contact:   Good   Psychomotor Behavior: Normal   Attitude:   Cooperative   Orientation:   All  Speech   Rate / Production: Normal    Volume:  Normal   Mood:    Depressed   Affect:    Appropriate   Thought Content:   Clear  Thought Form:  Coherent  Logical     Insight:    Good     Plan:     Safety Plan: No current safety concerns identified.  Recommended that patient call 911 or go to the local ED should there be a change in any of these risk factors.     Barriers to treatment: None identified    Patient Contracts (see media tab):  None    Substance Use: Not addressed in session     Continue or Discharge: Patient will continue in Adult Day Treatment (ADT)  as planned. Patient is likely to benefit from learning and using skills as they work toward the goals identified in their treatment plan.      Magali Dodson, [facility]  November 1, 2021

## 2021-11-01 NOTE — GROUP NOTE
Psychotherapy Group Note    PATIENT'S NAME: Joel Pineda  MRN:   [id number]  :   1975  ACCT. NUMBER: [id number]  DATE OF SERVICE: 21  START TIME:  3:00 PM  END TIME:  3:50 PM  FACILITATOR: Lashell Leonard  TOPIC: MH EBP Group: Emotions Management  [facility] Mental Health Day Treatment  TRACK: 4B                                      Service Modality:  Video Visit     Telemedicine Visit: The patient's condition can be safely assessed and treated via synchronous audio and visual telemedicine encounter.      Reason for Telemedicine Visit: Services only offered telehealth    Originating Site (Patient Location): Patient's home    Distant Site (Provider Location): Provider Remote Setting- Home Office    Consent:  The patient/guardian has verbally consented to: the potential risks and benefits of telemedicine (video visit) versus in person care; bill my insurance or make self-payment for services provided; and responsibility for payment of non-covered services.     Patient would like the video invitation sent by:  My Chart    Mode of Communication:  Video Conference via Medical Zoom    As the provider I attest to compliance with applicable laws and regulations related to telemedicine.          NUMBER OF PARTICIPANTS: 5    Summary of Group / Topics Discussed:  Emotions Management: Opposite to Emotion: Patients discussed past and present struggles with knowing how to make changes in their lives due to difficult emotional experiences.  Explored desires to experience and feel less anger, sadness, guilt, and fear.  Reviewed the therapeutic skill of opposite action and patients explored opportunities to use their behaviors as a tool to reduce an emotion that they want to change.     Patient Session Goals / Objectives:    Review DBT concepts and focus on patient s experiences of distress and difficult emotional experiences.    Learn how to do the opposite of what an emotion makes us want to do in an  effort to decrease an unwanted emotional experience.    Demonstrate understanding of the skill of opposite action by sharing experiences where the technique could be useful in past / present situations.      Patient Participation / Response:  Minimally participated, only when prompted / asked.    Demonstrated understanding of topics discussed through group discussion and participation    Treatment Plan:  Patient has a current master individualized treatment plan.  See Epic treatment plan for more information.    Lashell Leonard

## 2021-11-01 NOTE — GROUP NOTE
Psychotherapy Group Note    PATIENT'S NAME: Joel Pineda  MRN:   [id number]  :   1975  ACCT. NUMBER: [id number]  DATE OF SERVICE: 21  START TIME:  2:00 PM  END TIME:  2:50 PM  FACILITATOR: Magali Dodson [facility]  TOPIC: MH EBP Group: Coping Skills  [facility] Adult Mental Health Day Treatment  TRACK: 4B    NUMBER OF PARTICIPANTS: 5    Summary of Group / Topics Discussed:  Coping Skills: Additional Coping Skills:  Patients discussed and practiced time management.  Reviewed the benefits of applying the aforementioned coping strategies.  Patients explored how these strategies might be applied to daily stressors or distressing situations.    Patient Session Goals / Objectives:    Understand the purpose and benefits of applying time management coping strategies    Address barriers to utilizing coping skills when in distress.                                    Service Modality:  Video Visit     Telemedicine Visit: The patient's condition can be safely assessed and treated via synchronous audio and visual telemedicine encounter.      Reason for Telemedicine Visit: Services only offered telehealth    Originating Site (Patient Location): Patient's home    Distant Site (Provider Location): Provider Remote Setting- Home Office    Consent:  The patient/guardian has verbally consented to: the potential risks and benefits of telemedicine (video visit) versus in person care; bill my insurance or make self-payment for services provided; and responsibility for payment of non-covered services.     Patient would like the video invitation sent by:  My Chart    Mode of Communication:  Video Conference via Medical Zoom    As the provider I attest to compliance with applicable laws and regulations related to telemedicine.           Patient Participation / Response:  Fully participated with the group by sharing personal reflections / insights and openly received / provided feedback with other participants.    Expressed  understanding of the relevance / importance of coping skills at distressing times in life, Identified 2-3 positive coping strategies that have helped maintain / improve symptoms in the past and Identified / Expressed personal readiness to practice new coping skills    Treatment Plan:  Patient has a current master individualized treatment plan.  See Epic treatment plan for more information.    Magali Dodson, LICSW

## 2021-11-02 ENCOUNTER — OFFICE VISIT (OUTPATIENT)
Dept: NEUROLOGY | Facility: CLINIC | Age: 46
End: 2021-11-02
Payer: MEDICARE

## 2021-11-02 DIAGNOSIS — M45.8 ANKYLOSING SPONDYLITIS OF SACRAL REGION (H): ICD-10-CM

## 2021-11-02 DIAGNOSIS — G62.9 PERIPHERAL POLYNEUROPATHY: ICD-10-CM

## 2021-11-02 RX ORDER — NABUMETONE 500 MG/1
TABLET, FILM COATED ORAL
Qty: 120 TABLET | Refills: 0 | Status: SHIPPED | OUTPATIENT
Start: 2021-11-02 | End: 2021-12-13

## 2021-11-02 NOTE — PROGRESS NOTES
[facility]  Electrodiagnostic Laboratory    Nerve Conduction & EMG Report          Patient:       Joel Pineda  Patient ID:    [id number]  Gender:        Male  YOB: 1975  Age:           46 Years 4 Months        History & Examination:  46 year old man with anklylosing spondylitis treated with TNF inhibitors and prior low back surgery () who reports difficulty manipulating his right foot over the last 2-3 weeks. He also reports pain in the toes of both feet. Evaluate for polyneuropathy vs focal neuropathy vs radiculopathy. Prior study dated 3/22/19.      Techniques: Motor and sensory conduction studies were done with surface recording electrodes. EMG was done with a concentric needle electrode.     Results:  Nerve conduction studies:   1. Bilateral sural, bilateral superficial peroneal, and left radial sensory responses are normal.   2. Left peroneal-EDB motor response is absent.   3. Right peroneal-EDB motor response shows severely prolonged DL, moderately reduced amplitude and moderately slowed CV in the foreleg and across knee segments.   4. Bilateral peroneal-TA, bilateral tibial-AH, and left median-APB motor responses are normal.     Needle EM. Fasciculation potentials were seen in the right gastrocnemius muscle. No other abnormal spontaneous activity was appreciated in the sampled muscles.   2. Large amplitude and/or long duration motor unit potentials (MUP) were seen in the right TA, gastrocnemius and glut med muscles.   3. Rapidly firing MUPs with reduced recruitment were seen in the right TA muscle.     Interpretation:  This is an abnormal study. There is electrophysiologic evidence of (1) a severe left-sided deep peroneal neuropathy and (2) a moderate right-sided deep peroneal neuropathy. Although localization cannot be ascertained with certainty, the presence of markedly prolonged peroneal to EDB distal latency (on the right), normal peroneal to TA motor responses  and normal needle EMG of peroneal innervated muscles in the leg suggests localization to the deep peroneal nerve at or near the ankle. In addition, there is evidence of (3) a chronic right-sided L5/S1 radiculopathy superimposed upon the deep peroneal neuropathies. There is no evidence of a left-sided lumbosacral radiculopathy or large-fiber polyneuropathy on the basis of this study. Clinical correlation is recommended. Please also see additional comment.     Comment:   Compared to a prior study dated 3/22/2019 there has been interval improvement of the previously observed sensory response abnormalities, and hence today's study shows no evidence of a sensorimotor axonal polyneuropathy as was previously observed. Conversely, there has been interval worsening of the deep peroneal neuropathies. In addition, it is notable that the patient reports that the new right foot symptoms began in mid October. Although no active denervation changes were appreciated on today's study, these can take up to 3 weeks to develop following an acute denervating injury. If there is clinical concern for an evolving or progressive neuropathic process then a repeat study in 1-2 months to look for evidence of active or recent axonal injury may provide additional diagnostic data.     Ramakrishna Badillo MD  Department of Neurology           Sensory NCS      Nerve / Sites Rec. Site Onset Peak NP Amp Ref. PP Amp Dist Patrick Ref. Temp     ms ms  V  V  V cm m/s m/s  C   L RADIAL - Snuff      Forearm Snuff 2.14 2.92 20.0 15.0 27.7 12.5 58.5 48.0 32.2   L SURAL - Lat Mall      Calf Ankle 2.81 4.01 9.0 8.0 6.8 14 49.8 38.0    R SURAL - Lat Mall      Calf Ankle 3.39 4.27 8.5 8.0 7.7 14 41.4 38.0    L SUP PERONEAL      Lat Leg Rosario 3.13 4.27 3.9  2.0 12.5 40.0 38.0 32.4   R SUP PERONEAL      Lat Leg Rosario 3.02 4.43 3.7  1.7 12.5 41.4 38.0 32.6       Motor NCS      Nerve / Sites Rec. Site Lat Ref. Amp Ref. Rel Amp Dist Patrick Ref. Dur. Area Temp.     ms ms mV mV % cm  m/s m/s ms %  C   L MEDIAN - APB      Wrist APB 3.65 4.40 7.8 5.0 100 8   7.76 100 32.6      Elbow APB 8.13  7.8  101 24 53.6 48.0 8.23 99.6 32.6   L DEEP PERONEAL - EDB      Ankle EDB NR 6.00 NR 2.5 NR 8   NR NR 30.5   R DEEP PERONEAL - EDB 60      Ankle EDB 11.51 6.00 0.5 2.0 100 8   5.21 100 30.9      FibHead EDB 23.39  0.5  104 36 30.3 38.0 26.30 407 30.6      Pop Fos EDB 25.78  0.6  124 8 33.4 38.0 6.56 168 30.4   L TIBIAL - AH      Ankle AH 4.38 6.00 7.2 4.0 100 8   7.45 100       Pop Fos AH 15.83  3.6  49.4 45 39.3 38.0 9.32 60.5    R TIBIAL - AH      Ankle AH 4.01 6.00 6.8 4.0 100 8   7.40 100    L PERONEAL - Tib Ant      Fib Head Tib Ant 3.13  6.4  100 14   15.94 100       Knee Tib Ant 4.84  5.4  84.1 9 52.4  16.35 92.6    R PERONEAL - Tib Ant      Fib Head Tib Ant 3.13  5.9  100 12   13.28 100       Knee Tib Ant 5.00  5.4  92.2 10 53.3  13.28 98.9        F  Wave      Nerve Min F Lat Max F Lat Mean FLat Temp.    ms ms ms  C   L TIBIAL 52.34 58.44 56.16        EMG Summary Table     Spontaneous MUAP Recruitment    IA Fib/PSW Fasc H.F. Amp Dur. PPP Pattern   L. VAST LATERALIS N None None None N N N Normal   L. TIB ANTERIOR N None None None N N N Normal   L. PERON LONGUS N None None None N N N Normal   L. GASTROCN (MED) N None None None N N N Normal   L. TIB POSTERIOR N None None None N N N Normal   R. VAST LATERALIS N None None None N N N Normal   R. TIB ANTERIOR N None None None 2+ N N Mildly Reduced   R. GASTROCN (MED) N None 1+ None N 2+ 1+ Normal   R. GLUTEUS MED N None None None N 1+ 1+ Normal

## 2021-11-02 NOTE — PROGRESS NOTES
Acknowledgement of Current Treatment Plan       I have reviewed my treatment plan with my therapist / counselor on 10/28/2021.   I agree with the plan as it is written in the electronic health record. (4B)    Name:      Signature:  Joel Pineda Unable to sign due to COVID     Joel Tan MD  Psychiatrist/Medical Director Joel Tan MD on 11/4/2021 at 6:12 PM   ABHIJEET Salas Psychotherapist ABHIJEET Salas on 11/2/2021 at 5:06 PM     Magali Dodson [facility] [place]  Psychotherapist FIORELLA Ybarra, [facility] [place]

## 2021-11-02 NOTE — LETTER
2021     RE: Joel Pnieda  [address] [address] [place] [address]     Dear Colleague,    Thank you for referring your patient, Joel Pineda, to the [facility] at [facility]. Please see a copy of my visit note below.        [facility]  Electrodiagnostic Laboratory    Nerve Conduction & EMG Report          Patient:       Joel Pineda  Patient ID:    [id number]  Gender:        Male  YOB: 1975  Age:           46 Years 4 Months        History & Examination:  46 year old man with anklylosing spondylitis treated with TNF inhibitors and prior low back surgery () who reports difficulty manipulating his right foot over the last 2-3 weeks. He also reports pain in the toes of both feet. Evaluate for polyneuropathy vs focal neuropathy vs radiculopathy. Prior study dated 3/22/19.      Techniques: Motor and sensory conduction studies were done with surface recording electrodes. EMG was done with a concentric needle electrode.     Results:  Nerve conduction studies:   1. Bilateral sural, bilateral superficial peroneal, and left radial sensory responses are normal.   2. Left peroneal-EDB motor response is absent.   3. Right peroneal-EDB motor response shows severely prolonged DL, moderately reduced amplitude and moderately slowed CV in the foreleg and across knee segments.   4. Bilateral peroneal-TA, bilateral tibial-AH, and left median-APB motor responses are normal.     Needle EM. Fasciculation potentials were seen in the right gastrocnemius muscle. No other abnormal spontaneous activity was appreciated in the sampled muscles.   2. Large amplitude and/or long duration motor unit potentials (MUP) were seen in the right TA, gastrocnemius and glut med muscles.   3. Rapidly firing MUPs with reduced recruitment were seen in the right TA muscle.     Interpretation:  This is an abnormal study. There is electrophysiologic evidence of  (1) a severe left-sided deep peroneal neuropathy and (2) a moderate right-sided deep peroneal neuropathy. Although localization cannot be ascertained with certainty, the presence of markedly prolonged peroneal to EDB distal latency (on the right), normal peroneal to TA motor responses and normal needle EMG of peroneal innervated muscles in the leg suggests localization to the deep peroneal nerve at or near the ankle. In addition, there is evidence of (3) a chronic right-sided L5/S1 radiculopathy superimposed upon the deep peroneal neuropathies. There is no evidence of a left-sided lumbosacral radiculopathy or large-fiber polyneuropathy on the basis of this study. Clinical correlation is recommended. Please also see additional comment.     Comment:   Compared to a prior study dated 3/22/2019 there has been interval improvement of the previously observed sensory response abnormalities, and hence today's study shows no evidence of a sensorimotor axonal polyneuropathy as was previously observed. Conversely, there has been interval worsening of the deep peroneal neuropathies. In addition, it is notable that the patient reports that the new right foot symptoms began in mid October. Although no active denervation changes were appreciated on today's study, these can take up to 3 weeks to develop following an acute denervating injury. If there is clinical concern for an evolving or progressive neuropathic process then a repeat study in 1-2 months to look for evidence of active or recent axonal injury may provide additional diagnostic data.     Ramakrishna Badillo MD  Department of Neurology           Sensory NCS      Nerve / Sites Rec. Site Onset Peak NP Amp Ref. PP Amp Dist Patrick Ref. Temp     ms ms  V  V  V cm m/s m/s  C   L RADIAL - Snuff      Forearm Snuff 2.14 2.92 20.0 15.0 27.7 12.5 58.5 48.0 32.2   L SURAL - Lat Mall      Calf Ankle 2.81 4.01 9.0 8.0 6.8 14 49.8 38.0    R SURAL - Lat Mall      Calf Ankle 3.39 4.27 8.5 8.0  7.7 14 41.4 38.0    L SUP PERONEAL      Lat Leg Rosario 3.13 4.27 3.9  2.0 12.5 40.0 38.0 32.4   R SUP PERONEAL      Lat Leg Rosario 3.02 4.43 3.7  1.7 12.5 41.4 38.0 32.6       Motor NCS      Nerve / Sites Rec. Site Lat Ref. Amp Ref. Rel Amp Dist Patrick Ref. Dur. Area Temp.     ms ms mV mV % cm m/s m/s ms %  C   L MEDIAN - APB      Wrist APB 3.65 4.40 7.8 5.0 100 8   7.76 100 32.6      Elbow APB 8.13  7.8  101 24 53.6 48.0 8.23 99.6 32.6   L DEEP PERONEAL - EDB      Ankle EDB NR 6.00 NR 2.5 NR 8   NR NR 30.5   R DEEP PERONEAL - EDB 60      Ankle EDB 11.51 6.00 0.5 2.0 100 8   5.21 100 30.9      FibHead EDB 23.39  0.5  104 36 30.3 38.0 26.30 407 30.6      Pop Fos EDB 25.78  0.6  124 8 33.4 38.0 6.56 168 30.4   L TIBIAL - AH      Ankle AH 4.38 6.00 7.2 4.0 100 8   7.45 100       Pop Fos AH 15.83  3.6  49.4 45 39.3 38.0 9.32 60.5    R TIBIAL - AH      Ankle AH 4.01 6.00 6.8 4.0 100 8   7.40 100    L PERONEAL - Tib Ant      Fib Head Tib Ant 3.13  6.4  100 14   15.94 100       Knee Tib Ant 4.84  5.4  84.1 9 52.4  16.35 92.6    R PERONEAL - Tib Ant      Fib Head Tib Ant 3.13  5.9  100 12   13.28 100       Knee Tib Ant 5.00  5.4  92.2 10 53.3  13.28 98.9        F  Wave      Nerve Min F Lat Max F Lat Mean FLat Temp.    ms ms ms  C   L TIBIAL 52.34 58.44 56.16        EMG Summary Table     Spontaneous MUAP Recruitment    IA Fib/PSW Fasc H.F. Amp Dur. PPP Pattern   L. VAST LATERALIS N None None None N N N Normal   L. TIB ANTERIOR N None None None N N N Normal   L. PERON LONGUS N None None None N N N Normal   L. GASTROCN (MED) N None None None N N N Normal   L. TIB POSTERIOR N None None None N N N Normal   R. VAST LATERALIS N None None None N N N Normal   R. TIB ANTERIOR N None None None 2+ N N Mildly Reduced   R. GASTROCN (MED) N None 1+ None N 2+ 1+ Normal   R. GLUTEUS MED N None None None N 1+ 1+ Normal                                  Again, thank you for allowing me to participate in the care of your patient.       Sincerely,    Ramakrishna aBdillo MD

## 2021-11-02 NOTE — TELEPHONE ENCOUNTER
Routing refill request to provider for review/approval because:  Labs not current:  AST, ALT, creatinine, CBC    Has lab appointment in 1 week.      Saniya Hightower RN  [facility]

## 2021-11-04 ENCOUNTER — HOSPITAL ENCOUNTER (OUTPATIENT)
Dept: BEHAVIORAL HEALTH | Facility: CLINIC | Age: 46
End: 2021-11-04
Attending: PSYCHIATRY & NEUROLOGY
Payer: MEDICARE

## 2021-11-04 DIAGNOSIS — G47.33 OSA (OBSTRUCTIVE SLEEP APNEA): Primary | ICD-10-CM

## 2021-11-04 PROCEDURE — 90853 GROUP PSYCHOTHERAPY: CPT | Mod: PO

## 2021-11-04 PROCEDURE — 90853 GROUP PSYCHOTHERAPY: CPT | Mod: GT | Performed by: SOCIAL WORKER

## 2021-11-04 NOTE — GROUP NOTE
Psychotherapy Group Note    PATIENT'S NAME: Joel Pineda  MRN:   [id number]  :   1975  ACCT. NUMBER: [id number]  DATE OF SERVICE: 21  START TIME:  1:00 PM  END TIME:  1:50 PM  FACILITATOR: Amy Ferro LGSW  TOPIC: MH EBP Group: Behavioral Activation  [facility] Adult Mental Health Day Treatment  TRACK: 4B    NUMBER OF PARTICIPANTS:     Summary of Group / Topics Discussed:  Behavioral Activation: Activity Scheduling:Patients explored how they currently spend their time, and how specific behaviors impact thoughts and feelings.  The group explored the effect of negative and positive activities on mood states and thought patterns.  Patients identified activities that help to improve mood and thinking patterns, and developed a plan to implement positive activities between sessions.      Patient Session Goals / Objectives:    Identify impact of current behaviors on thoughts and mood    Identify 2-3 behavioral changes that could have a positive impact on thoughts and mood    Prepare to make desired behavioral change: Create a change plan / activity schedule                                      Service Modality:  Video Visit     Telemedicine Visit: The patient's condition can be safely assessed and treated via synchronous audio and visual telemedicine encounter.      Reason for Telemedicine Visit: Services only offered telehealth    Originating Site (Patient Location): Patient's home    Distant Site (Provider Location): Provider Remote Setting- Home Office    Consent:  The patient/guardian has verbally consented to: the potential risks and benefits of telemedicine (video visit) versus in person care; bill my insurance or make self-payment for services provided; and responsibility for payment of non-covered services.     Patient would like the video invitation sent by:  My Chart    Mode of Communication:  Video Conference via Medical Zoom    As the provider I attest to compliance with applicable laws  and regulations related to telemedicine.            Patient Participation / Response:  Fully participated with the group by sharing personal reflections / insights and openly received / provided feedback with other participants.    Demonstrated understanding of topics discussed through group discussion and participation, Expressed understanding of the relationship between behaviors, thoughts, and feelings and Shared experiences and challenges with making behavioral changes    Treatment Plan:  Patient has a current master individualized treatment plan.  See Epic treatment plan for more information.    Amy Ferro LGSW

## 2021-11-04 NOTE — GROUP NOTE
Psychotherapy Group Note    PATIENT'S NAME: Joel Pineda  MRN:   [id number]  :   1975  ACCT. NUMBER: [id number]  DATE OF SERVICE: 21  START TIME:  3:00 PM  END TIME:  3:50 PM  FACILITATOR: Amy Ferro LGSW  TOPIC: MH EBP Group: Behavioral Activation  [facility] Adult Mental Health Day Treatment  TRACK: 4B    NUMBER OF PARTICIPANTS:     Summary of Group / Topics Discussed:  Behavioral Activation: Motivation and Procrastination: Patients explored how they currently spend their time, identifying thoughts and feelings that are motivating and serve to increase desired behaviors.  They also examined behaviors that contribute to procrastination.  Different types of procrastination behaviors were identified, and strategies to reduce individual procrastination and increase motivation were explored and practiced.  Patients identified ways to increase goal-directed activities to enhance mood and reduce symptoms.        Patient Session Goals / Objectives:    Identify current patterns of procrastination behavior and how they influence thoughts and moods, and inhibit motivation.    Identify behaviors that can be implemented that contribute to improving thoughts and feelings, motivation, and reduce symptoms.    Identify and develop a plan to increase activities that promote a sense of accomplishment and competence.    Practice scheduling positive activities / behaviors into daily routines.                                      Service Modality:  Video Visit     Telemedicine Visit: The patient's condition can be safely assessed and treated via synchronous audio and visual telemedicine encounter.      Reason for Telemedicine Visit: Services only offered telehealth    Originating Site (Patient Location): Patient's home    Distant Site (Provider Location): Provider Remote Setting- Home Office    Consent:  The patient/guardian has verbally consented to: the potential risks and benefits of telemedicine (video  visit) versus in person care; bill my insurance or make self-payment for services provided; and responsibility for payment of non-covered services.     Patient would like the video invitation sent by:  My Chart    Mode of Communication:  Video Conference via Medical Zoom    As the provider I attest to compliance with applicable laws and regulations related to telemedicine.            Patient Participation / Response:  Fully participated with the group by sharing personal reflections / insights and openly received / provided feedback with other participants.    Demonstrated understanding of topics discussed through group discussion and participation, Expressed understanding of the relationship between behaviors, thoughts, and feelings and Shared experiences and challenges with making behavioral changes    Treatment Plan:  Patient has a current master individualized treatment plan.  See Epic treatment plan for more information.    Amy Ferro LGSW

## 2021-11-04 NOTE — GROUP NOTE
Process Group Note    PATIENT'S NAME: Joel Pineda  MRN:   [id number]  :   1975  ACCT. NUMBER: [id number]  DATE OF SERVICE: 21  START TIME:  2:00 PM  END TIME:  2:50 PM  FACILITATOR: Magali Dodson [facility]  TOPIC:  Process Group    Diagnoses:  296.33 (F33.2) Major Depressive Disorder, Recurrent Episode, Severe _.  4. Other Diagnoses that is relevant to services:   300.00 (F41.9) Unspecified Anxiety Disorder  301.83 (F60.3) Borderline Personality Disorder.      [facility] Mental Health Day Treatment  TRACK: 4B    NUMBER OF PARTICIPANTS: 5                                      Service Modality:  Video Visit     Telemedicine Visit: The patient's condition can be safely assessed and treated via synchronous audio and visual telemedicine encounter.      Reason for Telemedicine Visit: Services only offered telehealth    Originating Site (Patient Location): Patient's home    Distant Site (Provider Location): Provider Remote Setting- Home Office    Consent:  The patient/guardian has verbally consented to: the potential risks and benefits of telemedicine (video visit) versus in person care; bill my insurance or make self-payment for services provided; and responsibility for payment of non-covered services.     Patient would like the video invitation sent by:  My Chart    Mode of Communication:  Video Conference via Medical Zoom    As the provider I attest to compliance with applicable laws and regulations related to telemedicine.               Data:    Session content: At the start of this group, patients were invited to check in by identifying themselves, describing their current emotional status, and identifying issues to address in this group.   Area(s) of treatment focus addressed in this session included Symptom Management, Personal Safety and Community Resources/Discharge Planning.  Tito reported frustration at a provider for a recent neurosurgeon appointment.  He stated that the nurse  "practitioner seemed to be a \"\" for direct communication with the provider.  He stated that he intends to wait for his test results which should arrive by a phone call from the provider to explain the results.  Tito is considering speaking with the clinic manager about the communication problem.  He reported depressed and anxious mood.  Client denied suicidal ideation, intent and plan.     Therapeutic Interventions/Treatment Strategies:  Psychotherapist offered support, feedback and validation and reinforced use of skills. Treatment modalities used include Cognitive Behavioral Therapy. Interventions include Cognitive Restructuring:  Assisted patient in formulating new neutral/positive alternatives to challenge less helpful / ineffective thoughts.    Assessment:    Patient response:   Patient responded to session by giving feedback, listening and focusing on goals    Possible barriers to participation / learning include: and no barriers identified    Health Issues:   None reported       Substance Use Review:   Substance Use: No active concerns identified.    Mental Status/Behavioral Observations  Appearance:   Appropriate   Eye Contact:   Good   Psychomotor Behavior: Normal   Attitude:   Cooperative   Orientation:   All  Speech   Rate / Production: Normal    Volume:  Normal   Mood:    Anxious  Depressed   Affect:    Appropriate   Thought Content:   Clear  Thought Form:  Coherent  Logical     Insight:    Good     Plan:     Safety Plan: No current safety concerns identified.  Recommended that patient call 911 or go to the local ED should there be a change in any of these risk factors.     Barriers to treatment: None identified    Patient Contracts (see media tab):  None    Substance Use: Not addressed in session     Continue or Discharge: Patient will continue in Adult Day Treatment (ADT)  as planned. Patient is likely to benefit from learning and using skills as they work toward the goals identified in " their treatment plan.      Magali Dodson, [facility]  November 4, 2021

## 2021-11-05 ENCOUNTER — TELEPHONE (OUTPATIENT)
Dept: NEUROSURGERY | Facility: CLINIC | Age: 46
End: 2021-11-05

## 2021-11-05 ENCOUNTER — TELEPHONE (OUTPATIENT)
Dept: NEUROSURGERY | Facility: CLINIC | Age: 46
End: 2021-11-05
Payer: MEDICARE

## 2021-11-05 NOTE — TELEPHONE ENCOUNTER
[facility] Call Center    Phone Message    May a detailed message be left on voicemail: yes     Reason for Call: Requesting Results   Name/type of test: EMG  Date of test: 11/2/2021      Patient had EMG done 11/2 w Justino and would like results form [facility]. Please call.      Action Taken: Message routed to:  [facility] NEUROSURGERY    Travel Screening: Not Applicable

## 2021-11-05 NOTE — TELEPHONE ENCOUNTER
Mr. Pineda was evaluated for in-person clinic visit on 10/13/2021.     Prominent findings - MR of Lumbar spine 9/17/2021:  L4-L5:  left paracentral disc extrusion.   L5-S1: Left paracentral disc extrusion with annular tear/disruption. Contact without displacement of the traversing S1 nerve roots left more than right, correlate for S1 radiculopathy.  Moderate foraminal stenosis is noted bilaterally L4-L5 and L5-S1. Correlate for possible unilateral or bilateral L4 and/or L5 radiculopathy.   Correlate for possible left S1 radiculopathy discussed at the L5-S1 level.    He did not present with radiating pain or symtpoms down lower extremities and an atypical presentation for radiculopathy, without  dermatomal pattern of pain in lower extremities, which prompted diagnostic EMG nerve study.      He underwent bilateral lower extremity EMG nerve study with Dr. Ramakrishna Badillo in Neurology:    EMG NERVE STUDY   11/02/2021  Interpretation:  This is an abnormal study. There is electrophysiologic evidence of (1) a severe left-sided deep peroneal neuropathy and (2) a moderate right-sided deep peroneal neuropathy. Although localization cannot be ascertained with certainty, the presence of markedly prolonged peroneal to EDB distal latency (on the right), normal peroneal to TA motor responses and normal needle EMG of peroneal innervated muscles in the leg suggests localization to the deep peroneal nerve at or near the ankle. In addition, there is evidence of (3) a chronic right-sided L5/S1 radiculopathy superimposed upon the deep peroneal neuropathies. There is no evidence of a left-sided lumbosacral radiculopathy or large-fiber polyneuropathy on the basis of this study. Clinical correlation is recommended. Please also see additional comment.     Spoke to patient via telephone regarding findings.   Dr. Nixon Gallagher will see patient in clinic for further evaluation.     Patient is agreeable to plan.    Kellie Guerra DNP  Neurosurgery  Nurse Practitioner  [facility]  [phone]

## 2021-11-05 NOTE — TELEPHONE ENCOUNTER
[facility] Call [facility]    Phone Message    May a detailed message be left on voicemail: yes     Reason for Call: Other: Per Kellie Guerra, Pt is to see Dr. Gallagher. Please call Pt back to schedule.     Action Taken: Message routed to:  [facility] Neurosurgery    Travel Screening: Not Applicable

## 2021-11-06 ENCOUNTER — MYC REFILL (OUTPATIENT)
Dept: FAMILY MEDICINE | Facility: CLINIC | Age: 46
End: 2021-11-06
Payer: MEDICARE

## 2021-11-06 DIAGNOSIS — M47.816 LUMBAR FACET ARTHROPATHY: ICD-10-CM

## 2021-11-06 DIAGNOSIS — M45.8 ANKYLOSING SPONDYLITIS OF SACRAL REGION (H): ICD-10-CM

## 2021-11-06 DIAGNOSIS — M51.369 DDD (DEGENERATIVE DISC DISEASE), LUMBAR: ICD-10-CM

## 2021-11-08 ENCOUNTER — HOSPITAL ENCOUNTER (OUTPATIENT)
Dept: BEHAVIORAL HEALTH | Facility: CLINIC | Age: 46
End: 2021-11-08
Attending: PSYCHIATRY & NEUROLOGY
Payer: MEDICARE

## 2021-11-08 PROCEDURE — 90853 GROUP PSYCHOTHERAPY: CPT | Mod: PO | Performed by: SOCIAL WORKER

## 2021-11-08 PROCEDURE — 90853 GROUP PSYCHOTHERAPY: CPT | Mod: GT

## 2021-11-08 PROCEDURE — 90853 GROUP PSYCHOTHERAPY: CPT | Mod: GT | Performed by: SOCIAL WORKER

## 2021-11-08 RX ORDER — OXYCODONE HYDROCHLORIDE 5 MG/1
5 TABLET ORAL EVERY 6 HOURS PRN
Qty: 10 TABLET | Refills: 0 | Status: SHIPPED | OUTPATIENT
Start: 2021-11-08 | End: 2021-11-16

## 2021-11-08 NOTE — GROUP NOTE
Process Group Note    PATIENT'S NAME: Joel Pineda  MRN:   [id number]  :   1975  ACCT. NUMBER: [id number]  DATE OF SERVICE: 21  START TIME:  1:00 PM  END TIME:  1:50 PM  FACILITATOR: Magali Dodson [facility]  TOPIC:  Process Group    Diagnoses:  296.33 (F33.2) Major Depressive Disorder, Recurrent Episode, Severe _.  4. Other Diagnoses that is relevant to services:   300.00 (F41.9) Unspecified Anxiety Disorder  301.83 (F60.3) Borderline Personality Disorder.      [facility] Mental Health Day Treatment  TRACK: 4B    NUMBER OF PARTICIPANTS: 8                                      Service Modality:  Video Visit     Telemedicine Visit: The patient's condition can be safely assessed and treated via synchronous audio and visual telemedicine encounter.      Reason for Telemedicine Visit: Services only offered telehealth    Originating Site (Patient Location): Patient's home    Distant Site (Provider Location): Provider Remote Setting- Home Office    Consent:  The patient/guardian has verbally consented to: the potential risks and benefits of telemedicine (video visit) versus in person care; bill my insurance or make self-payment for services provided; and responsibility for payment of non-covered services.     Patient would like the video invitation sent by:  My Chart    Mode of Communication:  Video Conference via Medical Zoom    As the provider I attest to compliance with applicable laws and regulations related to telemedicine.               Data:    Session content: At the start of this group, patients were invited to check in by identifying themselves, describing their current emotional status, and identifying issues to address in this group.   Area(s) of treatment focus addressed in this session included Symptom Management, Personal Safety and Community Resources/Discharge Planning.  Tito reported connecting with the neurologist who has referred him to s specialist neurosurgeon.  He stated  that he is watching comedies and spending time with the cats as coping skills.   He stated that he felt guilty when asking for a refill on narcotic medication.  He stated that he is trying to practice non medication coping skills.  He stated that his goals for his rehabilitation are to restore functioning, decrease pain and keep his mobility.  Client denied suicidal ideation, intent and plan.     Therapeutic Interventions/Treatment Strategies:  Psychotherapist offered support, feedback and validation and reinforced use of skills. Treatment modalities used include Cognitive Behavioral Therapy. Interventions include Coping Skills: Discussed use of self-soothe skills to decrease distress in the body and Assisted patient in identifying 1-2 healthy distraction skills to reduce overall distress.    Assessment:    Patient response:   Patient responded to session by listening and focusing on goals    Possible barriers to participation / learning include: and no barriers identified    Health Issues:   None reported       Substance Use Review:   Substance Use: No active concerns identified.    Mental Status/Behavioral Observations  Appearance:   Appropriate   Eye Contact:   Good   Psychomotor Behavior: Normal   Attitude:   Cooperative  Friendly  Orientation:   All  Speech   Rate / Production: Normal    Volume:  Normal   Mood:    Anxious  Depressed   Affect:    Appropriate   Thought Content:   Clear  Thought Form:  Coherent  Logical     Insight:    Good     Plan:     Safety Plan: No current safety concerns identified.  Recommended that patient call 911 or go to the local ED should there be a change in any of these risk factors.     Barriers to treatment: None identified    Patient Contracts (see media tab):  None    Substance Use: Not addressed in session     Continue or Discharge: Patient will continue in Adult Day Treatment (ADT)  as planned. Patient is likely to benefit from learning and using skills as they work toward the  goals identified in their treatment plan.      Magali Dodson, [facility]  November 8, 2021

## 2021-11-08 NOTE — GROUP NOTE
Psychotherapy Group Note    PATIENT'S NAME: Joel Pineda  MRN:   [id number]  :   1975  ACCT. NUMBER: [id number]  DATE OF SERVICE: 21  START TIME:  3:00 PM  END TIME:  3:50 PM  FACILITATOR: Lashell Leonard  TOPIC: MH EBP Group: Specialty Awareness  [facility] Adult Mental Health Day Treatment  TRACK: 4B                                      Service Modality:  Video Visit     Telemedicine Visit: The patient's condition can be safely assessed and treated via synchronous audio and visual telemedicine encounter.      Reason for Telemedicine Visit:  covid 19    Originating Site (Patient Location): Patient's home    Distant Site (Provider Location): Provider Remote Setting- Home Office    Consent:  The patient/guardian has verbally consented to: the potential risks and benefits of telemedicine (video visit) versus in person care; bill my insurance or make self-payment for services provided; and responsibility for payment of non-covered services.     Patient would like the video invitation sent by:  My Chart    Mode of Communication:  Video Conference via Medical Zoom    As the provider I attest to compliance with applicable laws and regulations related to telemedicine.          NUMBER OF PARTICIPANTS: 6    Summary of Group / Topics Discussed:  Specialty Topics: Hope: The topic of hope was presented in order to help patients better understand the symptoms of hopelessness and how to become more hopeful. Patients discussed their current awareness of the topic and relevance to their functioning. Individual experiences with symptoms and treatment options were also discussed. Patients explored options for ongoing/future treatment and symptom management.      Patient Session Goals / Objectives:    Discussed definition of hopelessness    Discussed how hopelessness impacts functioning    Set a plan to utilize skills to reduce hopelessness      Patient Participation / Response:  Fully participated with the  group by sharing personal reflections / insights and openly received / provided feedback with other participants.    Demonstrated understanding of topics discussed through group discussion and participation, Identified / Expressed readiness to act on skill suggestions discussed in topic and Verbalized understanding of ways to proactively manage illness    Treatment Plan:  Patient has a current master individualized treatment plan.  See Epic treatment plan for more information.    Lashell Leonard

## 2021-11-09 ENCOUNTER — TELEPHONE (OUTPATIENT)
Dept: FAMILY MEDICINE | Facility: CLINIC | Age: 46
End: 2021-11-09

## 2021-11-09 ENCOUNTER — HOSPITAL ENCOUNTER (OUTPATIENT)
Dept: BEHAVIORAL HEALTH | Facility: CLINIC | Age: 46
End: 2021-11-09
Attending: PSYCHIATRY & NEUROLOGY
Payer: MEDICARE

## 2021-11-09 ASSESSMENT — PATIENT HEALTH QUESTIONNAIRE - PHQ9
10. IF YOU CHECKED OFF ANY PROBLEMS, HOW DIFFICULT HAVE THESE PROBLEMS MADE IT FOR YOU TO DO YOUR WORK, TAKE CARE OF THINGS AT HOME, OR GET ALONG WITH OTHER PEOPLE: SOMEWHAT DIFFICULT
SUM OF ALL RESPONSES TO PHQ QUESTIONS 1-9: 10
SUM OF ALL RESPONSES TO PHQ QUESTIONS 1-9: 10

## 2021-11-09 NOTE — TELEPHONE ENCOUNTER
RECORDS RECEIVED FROM: Internal   REASON FOR VISIT: Ankylosing spondylitis of sacral region   Date of Appt: 11/23/21   NOTES (FOR ALL VISITS) STATUS DETAILS   OFFICE NOTE from referring provider Internal Kellie Guerra DNP @ [facility] Neurology:  11/5/21 encounter  10/13/21   OFFICE NOTE from other specialist N/A    DISCHARGE SUMMARY from hospital N/A    DISCHARGE REPORT from the ER N/A    OPERATIVE REPORT N/A    MEDICATION LIST Internal    IMAGING  (FOR ALL VISITS)     EMG Internal [facility]:  11/2/21   3/22/19    [facility]:  2/25/20   EEG N/A    LUMBAR PUNCTURE N/A    ZEB SCAN N/A    ULTRASOUND (CAROTID BILAT) *VASCULAR* N/A    MRI (HEAD, NECK, SPINE) Received [facility]:  MRI Lumbar Spine 9/17/21    [facility]:  MRI Lumbar Spine 6/27/19   CT (HEAD, NECK, SPINE) N/A

## 2021-11-09 NOTE — PROGRESS NOTES
Psychiatry Provider Staffing Note    Met today with treatment team to discuss case, progress.    Joel Pineda is a 46 year old male enrolled in Adult Day Treatment.      Medications:   Current Outpatient Medications   Medication     acetaminophen 500 MG CAPS     albuterol (PROAIR HFA/PROVENTIL HFA/VENTOLIN HFA) 108 (90 Base) MCG/ACT inhaler     aspirin (ASA) 81 MG tablet     cetirizine (ZYRTEC) 10 MG tablet     cholecalciferol (D3-50) 1250 mcg (03112 units) capsule     clonazePAM (KLONOPIN) 0.5 MG tablet     cyanocobalamin (CYANOCOBALAMIN) 1000 MCG/ML injection     dronabinol (MARINOL) 5 MG capsule     DULoxetine (CYMBALTA) 60 MG capsule     EPINEPHrine (ANY BX GENERIC EQUIV) 0.3 MG/0.3ML injection 2-pack     eszopiclone (LUNESTA) 3 MG tablet     etanercept (ENBREL SURECLICK) 50 MG/ML autoinjector     famotidine (PEPCID) 20 MG tablet     fluticasone (FLONASE) 50 MCG/ACT nasal spray     fluticasone-salmeterol (ADVAIR) 250-50 MCG/DOSE inhaler     LATUDA 40 MG TABS tablet     levothyroxine (SYNTHROID/LEVOTHROID) 75 MCG tablet     lidocaine (XYLOCAINE) 5 % external ointment     melatonin 3 MG tablet     methocarbamol (ROBAXIN) 500 MG tablet     metoclopramide (REGLAN) 5 MG tablet     metoprolol succinate ER (TOPROL-XL) 200 MG 24 hr tablet     montelukast (SINGULAIR) 10 MG tablet     nabumetone (RELAFEN) 500 MG tablet     naloxone (NARCAN) 4 MG/0.1ML nasal spray     olopatadine (PATADAY) 0.2 % ophthalmic solution     omega-3 acid ethyl esters (LOVAZA) 1 g capsule     omeprazole 20 MG tablet     ondansetron (ZOFRAN-ODT) 8 MG ODT tab     order for DME     order for DME     order for DME     oxyCODONE (ROXICODONE) 5 MG tablet     pregabalin (LYRICA) 150 MG capsule     pyridostigmine (MESTINON) 60 MG tablet     ramipril (ALTACE) 10 MG capsule     rizatriptan (MAXALT-MLT) 5 MG ODT     rosuvastatin (CRESTOR) 40 MG tablet     syringe, disposable, (BD TUBERCULIN SYRINGE) 1 ML MISC     syringe/needle, disp, (BD INTEGRA SYRINGE)  "25G X 1\" 3 ML MISC     vitamin B complex with vitamin C (STRESS TAB) tablet     ZINC SULFATE-VITAMIN C MT     No current facility-administered medications for this encounter.         Diagnoses reviewed and include:  Patient Active Problem List   Diagnosis     Major depressive disorder, recurrent episode (H)     Intermittent asthma     Hyperlipidemia LDL goal <100     Chronic nonallergic rhinitis     Diverticulosis     GERD (gastroesophageal reflux disease)     Anxiety     LLOYD (obstructive sleep apnea)- mild (AHI 11)     Intracranial arachnoid cyst     Facet arthritis of cervical region     Acquired hypothyroidism     Bipolar 2 disorder (H)     Chronic midline low back pain without sciatica     Irritable bowel syndrome with diarrhea     B12 deficiency     Essential hypertension with goal blood pressure less than 140/90     Chronic, continuous use of opioids     Ankylosing spondylitis of sacral region (H)     Morbid obesity due to hypertriglyceridemia (H)     Fatty infiltration of liver     DDD (degenerative disc disease), lumbar     Type 2 diabetes mellitus with complication, without long-term current use of insulin (H)     Peripheral polyneuropathy     History of pulmonary embolism     Ingrown toenail     Hypertriglyceridemia     Gastroparesis     Orthostatic dizziness     POTS (postural orthostatic tachycardia syndrome)     PHN (postherpetic neuralgia)     Dysautonomia (H)     Major depressive disorder, recurrent episode, severe (H)     Chronic pain syndrome     MDD (major depressive disorder), recurrent severe, without psychosis (H)       Pertinent/updates:    Major depressive disorder     generalized anxiety disorder    Care team notes and discussion:    Many comorbid health problems    Still proprioception difficulties, workup continues with neurology    Still working on coping skills    Scheduled to finish on the 25th, given instability recently may need more time in program      Patient continues to meet " criteria for program.  Continue plan of care.    Joel Tan MD on 11/9/2021 at 8:23 AM

## 2021-11-09 NOTE — GROUP NOTE
Psychotherapy Group Note    PATIENT'S NAME: Joel Pineda  MRN:   [id number]  :   1975  ACCT. NUMBER: [id number]  DATE OF SERVICE: 21  START TIME:  2:00 PM  END TIME:  2:50 PM  FACILITATOR: Magali Dodson LICSW  TOPIC: MH EBP Group: Coping Skills  [facility] Adult [facility] Day Treatment  TRACK: 4B    NUMBER OF PARTICIPANTS: 7    Summary of Group / Topics Discussed:  Coping Skills: Additional Coping Skills:  Patients discussed and practiced skills to manage intrusive thoughts.  Reviewed the benefits of applying the aforementioned coping strategies.  Patients explored how these strategies might be applied to daily stressors or distressing situations.    Patient Session Goals / Objectives:    Understand the purpose and benefits of applying thought stopping,, mental engagement in activities such as word games, and other coping strategies    Clients shared their experiences with intrusive thoughts of different types.    Address barriers to utilizing coping skills when in distress.                                    Service Modality:  Video Visit     Telemedicine Visit: The patient's condition can be safely assessed and treated via synchronous audio and visual telemedicine encounter.      Reason for Telemedicine Visit: Patient has requested telehealth visit    Originating Site (Patient Location): Patient's home    Distant Site (Provider Location): Provider Remote Setting- Home Office    Consent:  The patient/guardian has verbally consented to: the potential risks and benefits of telemedicine (video visit) versus in person care; bill my insurance or make self-payment for services provided; and responsibility for payment of non-covered services.     Patient would like the video invitation sent by:  My Chart    Mode of Communication:  Video Conference via Medical Zoom    As the provider I attest to compliance with applicable laws and regulations related to telemedicine.             Patient  Participation / Response:  Fully participated with the group by sharing personal reflections / insights and openly received / provided feedback with other participants.    Demonstrated knowledge of when to consider using a variety of coping skills in daily life and Identified barriers to applying coping skills    Treatment Plan:  Patient has a current master individualized treatment plan.  See Epic treatment plan for more information.    Magali Dodson, LICSW

## 2021-11-09 NOTE — TELEPHONE ENCOUNTER
Called patient due to Dr. Cavazos being out today. He was schedule with her at 2:40 for possible shingles. The only available appointments are virtual and this is not an appropriate visit type. I let the patient know that he should be seen in UC for this.  Patient was agreeable and voiced his understanding.     ESTEFANI Simpson

## 2021-11-10 ENCOUNTER — LAB (OUTPATIENT)
Dept: LAB | Facility: CLINIC | Age: 46
End: 2021-11-10
Payer: MEDICARE

## 2021-11-10 ENCOUNTER — VIRTUAL VISIT (OUTPATIENT)
Dept: PHARMACY | Facility: CLINIC | Age: 46
End: 2021-11-10
Payer: COMMERCIAL

## 2021-11-10 DIAGNOSIS — E11.8 TYPE 2 DIABETES MELLITUS WITH COMPLICATION, WITHOUT LONG-TERM CURRENT USE OF INSULIN (H): ICD-10-CM

## 2021-11-10 DIAGNOSIS — E11.9 TYPE 2 DIABETES MELLITUS WITHOUT COMPLICATION, WITHOUT LONG-TERM CURRENT USE OF INSULIN (H): ICD-10-CM

## 2021-11-10 DIAGNOSIS — G89.4 CHRONIC PAIN SYNDROME: Primary | ICD-10-CM

## 2021-11-10 DIAGNOSIS — G47.00 INSOMNIA, UNSPECIFIED TYPE: ICD-10-CM

## 2021-11-10 DIAGNOSIS — Z79.899 HIGH RISK MEDICATIONS (NOT ANTICOAGULANTS) LONG-TERM USE: ICD-10-CM

## 2021-11-10 DIAGNOSIS — G89.4 CHRONIC PAIN SYNDROME: ICD-10-CM

## 2021-11-10 DIAGNOSIS — M45.9 ANKYLOSING SPONDYLITIS, UNSPECIFIED SITE OF SPINE (H): ICD-10-CM

## 2021-11-10 DIAGNOSIS — B02.9 HERPES ZOSTER WITHOUT COMPLICATION: ICD-10-CM

## 2021-11-10 DIAGNOSIS — F41.8 DEPRESSION WITH ANXIETY: ICD-10-CM

## 2021-11-10 DIAGNOSIS — G62.9 NEUROPATHY: ICD-10-CM

## 2021-11-10 LAB
ALBUMIN SERPL-MCNC: 4 G/DL (ref 3.4–5)
ALP SERPL-CCNC: 91 U/L (ref 40–150)
ALT SERPL W P-5'-P-CCNC: 38 U/L (ref 0–70)
AMPHETAMINES UR QL: NOT DETECTED
ANION GAP SERPL CALCULATED.3IONS-SCNC: 2 MMOL/L (ref 3–14)
AST SERPL W P-5'-P-CCNC: 40 U/L (ref 0–45)
BARBITURATES UR QL SCN: NOT DETECTED
BASOPHILS # BLD AUTO: 0.1 10E3/UL (ref 0–0.2)
BASOPHILS NFR BLD AUTO: 1 %
BENZODIAZ UR QL SCN: NOT DETECTED
BILIRUB DIRECT SERPL-MCNC: <0.1 MG/DL (ref 0–0.2)
BILIRUB SERPL-MCNC: 0.4 MG/DL (ref 0.2–1.3)
BUN SERPL-MCNC: 12 MG/DL (ref 7–30)
BUPRENORPHINE UR QL: NOT DETECTED
CALCIUM SERPL-MCNC: 9.1 MG/DL (ref 8.5–10.1)
CANNABINOIDS UR QL: NOT DETECTED
CHLORIDE BLD-SCNC: 111 MMOL/L (ref 94–109)
CHOLEST SERPL-MCNC: 160 MG/DL
CO2 SERPL-SCNC: 25 MMOL/L (ref 20–32)
COCAINE UR QL SCN: NOT DETECTED
CREAT SERPL-MCNC: 0.74 MG/DL (ref 0.66–1.25)
CREAT UR-MCNC: 125 MG/DL
CRP SERPL-MCNC: <2.9 MG/L (ref 0–8)
D-METHAMPHET UR QL: NOT DETECTED
EOSINOPHIL # BLD AUTO: 0.1 10E3/UL (ref 0–0.7)
EOSINOPHIL NFR BLD AUTO: 2 %
ERYTHROCYTE [DISTWIDTH] IN BLOOD BY AUTOMATED COUNT: 12.9 % (ref 10–15)
ERYTHROCYTE [SEDIMENTATION RATE] IN BLOOD BY WESTERGREN METHOD: 6 MM/HR (ref 0–15)
FASTING STATUS PATIENT QL REPORTED: YES
GFR SERPL CREATININE-BSD FRML MDRD: >90 ML/MIN/1.73M2
GLUCOSE BLD-MCNC: 136 MG/DL (ref 70–99)
HCT VFR BLD AUTO: 43.9 % (ref 40–53)
HDLC SERPL-MCNC: 27 MG/DL
HGB BLD-MCNC: 14.6 G/DL (ref 13.3–17.7)
IMM GRANULOCYTES # BLD: 0 10E3/UL
IMM GRANULOCYTES NFR BLD: 0 %
LDLC SERPL CALC-MCNC: 51 MG/DL
LDLC SERPL CALC-MCNC: ABNORMAL MG/DL
LYMPHOCYTES # BLD AUTO: 2.7 10E3/UL (ref 0.8–5.3)
LYMPHOCYTES NFR BLD AUTO: 41 %
MCH RBC QN AUTO: 30.9 PG (ref 26.5–33)
MCHC RBC AUTO-ENTMCNC: 33.3 G/DL (ref 31.5–36.5)
MCV RBC AUTO: 93 FL (ref 78–100)
METHADONE UR QL SCN: NOT DETECTED
MICROALBUMIN UR-MCNC: 8 MG/L
MICROALBUMIN/CREAT UR: 6.4 MG/G CR (ref 0–17)
MONOCYTES # BLD AUTO: 0.5 10E3/UL (ref 0–1.3)
MONOCYTES NFR BLD AUTO: 8 %
NEUTROPHILS # BLD AUTO: 3.2 10E3/UL (ref 1.6–8.3)
NEUTROPHILS NFR BLD AUTO: 48 %
NONHDLC SERPL-MCNC: 133 MG/DL
OPIATES UR QL SCN: NOT DETECTED
OXYCODONE UR QL SCN: DETECTED
PCP UR QL SCN: NOT DETECTED
PLATELET # BLD AUTO: 216 10E3/UL (ref 150–450)
POTASSIUM BLD-SCNC: 4.2 MMOL/L (ref 3.4–5.3)
PROPOXYPH UR QL: NOT DETECTED
PROT SERPL-MCNC: 7.6 G/DL (ref 6.8–8.8)
RBC # BLD AUTO: 4.73 10E6/UL (ref 4.4–5.9)
SODIUM SERPL-SCNC: 138 MMOL/L (ref 133–144)
TRICYCLICS UR QL SCN: NOT DETECTED
TRIGL SERPL-MCNC: 631 MG/DL
WBC # BLD AUTO: 6.7 10E3/UL (ref 4–11)

## 2021-11-10 PROCEDURE — 82248 BILIRUBIN DIRECT: CPT

## 2021-11-10 PROCEDURE — 83721 ASSAY OF BLOOD LIPOPROTEIN: CPT | Mod: 59

## 2021-11-10 PROCEDURE — 80306 DRUG TEST PRSMV INSTRMNT: CPT

## 2021-11-10 PROCEDURE — 82043 UR ALBUMIN QUANTITATIVE: CPT

## 2021-11-10 PROCEDURE — 99607 MTMS BY PHARM ADDL 15 MIN: CPT | Performed by: PHARMACIST

## 2021-11-10 PROCEDURE — 99606 MTMS BY PHARM EST 15 MIN: CPT | Performed by: PHARMACIST

## 2021-11-10 PROCEDURE — 85652 RBC SED RATE AUTOMATED: CPT

## 2021-11-10 PROCEDURE — 80053 COMPREHEN METABOLIC PANEL: CPT

## 2021-11-10 PROCEDURE — 36415 COLL VENOUS BLD VENIPUNCTURE: CPT

## 2021-11-10 PROCEDURE — 86140 C-REACTIVE PROTEIN: CPT

## 2021-11-10 PROCEDURE — 85025 COMPLETE CBC W/AUTO DIFF WBC: CPT

## 2021-11-10 PROCEDURE — 80061 LIPID PANEL: CPT

## 2021-11-10 RX ORDER — VALACYCLOVIR HYDROCHLORIDE 1 G/1
1 TABLET, FILM COATED ORAL
COMMUNITY
Start: 2021-11-09 | End: 2021-11-16

## 2021-11-10 ASSESSMENT — PATIENT HEALTH QUESTIONNAIRE - PHQ9: SUM OF ALL RESPONSES TO PHQ QUESTIONS 1-9: 10

## 2021-11-10 NOTE — PATIENT INSTRUCTIONS
Recommendations from today's MTM visit:                                                      Today we reviewed what your medicines are for, how to know if they are working, that your medicines are safe and how to make your medicine regimen as easy as possible.      Urine drug screen added on to existing labs.  ACT sent via [employer]      Follow-up: 10 weeks    It was great to speak with you today.  I value your experience and would be very thankful for your time with providing feedback on our clinic survey. You may receive a survey via email or text message in the next few days.     To schedule another MTM appointment, please call the clinic directly or you may call the MTM scheduling line at [phone] or toll-free at [phone].     My Clinical Pharmacist's contact information:                                                      Please feel free to contact me with any questions or concerns you have.      Radha Mcdonald, Pharm.D, BCACP  Medication Therapy Management Pharmacist

## 2021-11-10 NOTE — LETTER
November 17, 2021      Joel Pnieda  [address] [place] [address]        Bennett Francis,    Your lab results should be available for review prior to your appointment with Dr. Lyles.  Of note, your triglycerides and your cholesterol panel were very high.  He will likely want to talk with you about medication for this.  Please make sure you continue to take the Lovaza he has already prescribed.      Resulted Orders   Comprehensive metabolic panel   Result Value Ref Range    Sodium 138 133 - 144 mmol/L    Potassium 4.2 3.4 - 5.3 mmol/L    Chloride 111 (H) 94 - 109 mmol/L    Carbon Dioxide (CO2) 25 20 - 32 mmol/L    Anion Gap 2 (L) 3 - 14 mmol/L    Urea Nitrogen 12 7 - 30 mg/dL    Creatinine 0.74 0.66 - 1.25 mg/dL    Calcium 9.1 8.5 - 10.1 mg/dL    Glucose 136 (H) 70 - 99 mg/dL    Alkaline Phosphatase 91 40 - 150 U/L    AST 40 0 - 45 U/L    ALT 38 0 - 70 U/L    Protein Total 7.6 6.8 - 8.8 g/dL    Albumin 4.0 3.4 - 5.0 g/dL    Bilirubin Total 0.4 0.2 - 1.3 mg/dL    GFR Estimate >90 >60 mL/min/1.73m2      Comment:      As of July 11, 2021, eGFR is calculated by the CKD-EPI creatinine equation, without race adjustment. eGFR can be influenced by muscle mass, exercise, and diet. The reported eGFR is an estimation only and is only applicable if the renal function is stable.   Albumin Random Urine Quantitative with Creat Ratio   Result Value Ref Range    Creatinine Urine mg/dL 125 mg/dL    Albumin Urine mg/L 8 mg/L    Albumin Urine mg/g Cr 6.40 0.00 - 17.00 mg/g Cr   LDL cholesterol direct   Result Value Ref Range    LDL Cholesterol Direct 51 <100 mg/dL      Comment:      Age 0-19 years:  Desirable: 0-110 mg/dL   Borderline high: 110-129 mg/dL   High: >= 130 mg/dL    Age 20 years and older:  Desirable: <100mg/dL  Above desirable: 100-129 mg/dL   Borderline high: 130-159 mg/dL   High: 160-189 mg/dL   Very high: >= 190 mg/dL       If you have any questions or concerns, please call the clinic at the number listed  above.       Sincerely,      Lenore Fox MD  (for Dr. Lyles who is out of the office today)

## 2021-11-10 NOTE — PROGRESS NOTES
Medication Therapy Management (MTM) Encounter    ASSESSMENT:                            Medication Adherence/Access: No issues identified    Mood/Anxiety/Insomnia: Stable; follows closely with psychiatry;     Pain: Stable; urine drug screen was not run with other labs from earlier today. Called lab and they were able to add it on.    Diabetes: Stable.    HM: patient is due soon for updated ACT; will send via [employer].    PLAN:                          Urine drug screen added on to existing labs.  ACT sent via [employer]    Follow-up: 10 weeks    SUBJECTIVE/OBJECTIVE:                          Joel Pineda is a 46 year old male called for a follow up from 8/17/2021. He was referred to me from Ksenia Lyles.      Reason for visit: Diabetes    Allergies/ADRs: Reviewed in chart  Tobacco: He reports that he has never smoked. He has never used smokeless tobacco.  Alcohol: none  Caffeine: 36 ounces/day of coffee  Activity: None  Past Medical History: Reviewed in chart    Medication Adherence/Access: no issues reported    Shingles: current medications include valcyclovir 1000mg three times a day for 7 days. Was diagnosed with shingles yesterday.      Neuropathy: had an EMG which showed evidence of peroneal neuropathy. Meets with neurosurgery in a couple of weeks.      Mood/Anxiety/Insomnia: current therapy includes Latuda 60mg daily (dose just increased from 40mg to 60mg; change from lamotrigine and quetiapien)  duloxetine 120mg once daily, clonazepam 0.5mg twice daily (usually just taking at bedtime), Lunesta 3mg at bedtime,  melatonin 13mg at bedtime. Has been sleeping about 10 hours a night. Wakes up feeling groggy, this is not new for patient. Is not napping during the day. Less tired than when he was on lamotrigine and quetiapine. Lamotrigine was discuontinued due to rash.   Continues 2 hour a week group therapy with Gianna. Patient's psychiatrist is Dr. Rodriguez at [facility] in [place].       Pain: current therapy includes APAP as needed-patient is taking  500 mg 2 times daily, lyrica 150mg three time daily, oxycodone 5-10mg every 6 hours as needed maximum of 6 tablets/day (usually takes 2 tablets a day with increase in back pain and PT), methocarbamol 500mg-1000mg three times daily as needed (makes him tired so will reserve for afternoon/evening),  nabumetone 500 twice daily, Narcan nasal spray as needed. Patient reports he was supposed to get a urine drug screen and wondering if that was completed today when he had other labs drawn.    Diabetes:  Stopped glipizide (beginning of September)  due to episodes of hypoglycemia.   SMBG: rarely.    Recent symptoms of low blood sugar? none  Recent symptoms of high blood sugar? none  Eye exam: up to date  Foot exam:  Up to date  ACEi/ARB: Yes: Ramipril.   Urine Albumin:   Lab Results   Component Value Date    MICROL 8 10/14/2020     Lab Results   Component Value Date    A1C 6.0 08/19/2021    A1C 6.5 02/10/2021    A1C 6.0 10/16/2020    A1C 6.1 08/18/2020    A1C 6.0 01/24/2020    A1C 5.9 09/13/2019      Doesn't eat consistently. Eats light meals. Breakfast-Belvita crackers, yogurt, coffee Lunch- cinnamon toast with peanut butter Dinner-canned peaches or pears   Has started buying more [employer] meals  Aspirin: Taking 81mg daily and denies side effects    Today's Vitals: There were no vitals taken for this visit.  ----------------    I spent 20 minutes with this patient today. All changes were made via collaborative practice agreement with Ksenia Lyles . A copy of the visit note was provided to the patient's primary care provider.    The patient was sent via [employer] a summary of these recommendations.     Radha Mcdonald, Pharm.D, BCACP  Medication Therapy Management Pharmacist    Telemedicine Visit Details  Type of service:  Telephone visit  Start Time: 12:03PM  End Time: 12:23PM  Originating Location (patient location): Home  Distant Location  (provider location):  [facility] MTM        Medication Therapy Recommendations  No medication therapy recommendations to display

## 2021-11-11 ENCOUNTER — HOSPITAL ENCOUNTER (OUTPATIENT)
Dept: BEHAVIORAL HEALTH | Facility: CLINIC | Age: 46
End: 2021-11-11
Attending: PSYCHIATRY & NEUROLOGY
Payer: MEDICARE

## 2021-11-11 PROCEDURE — 90853 GROUP PSYCHOTHERAPY: CPT | Mod: PO

## 2021-11-11 PROCEDURE — 90853 GROUP PSYCHOTHERAPY: CPT | Mod: GT

## 2021-11-11 NOTE — GROUP NOTE
Psychotherapy Group Note    PATIENT'S NAME: Joel Pineda  MRN:   [id number]  :   1975  ACCT. NUMBER: [id number]  DATE OF SERVICE: 21  START TIME:  3:00 PM  END TIME:  3:50 PM  FACILITATOR: Lashell Leonard  TOPIC: MH EBP Group: Specialty Awareness  [facility] Adult Mental Health Day Treatment  TRACK: 4B                                      Service Modality:  Video Visit     Telemedicine Visit: The patient's condition can be safely assessed and treated via synchronous audio and visual telemedicine encounter.      Reason for Telemedicine Visit:  covid 19     Originating Site (Patient Location): Patient's home    Distant Site (Provider Location): Provider Remote Setting- Home Office    Consent:  The patient/guardian has verbally consented to: the potential risks and benefits of telemedicine (video visit) versus in person care; bill my insurance or make self-payment for services provided; and responsibility for payment of non-covered services.     Patient would like the video invitation sent by:  My Chart    Mode of Communication:  Video Conference via Medical Zoom    As the provider I attest to compliance with applicable laws and regulations related to telemedicine.          NUMBER OF PARTICIPANTS: 6    Summary of Group / Topics Discussed:  Specialty Topics: Forgiveness: Patients were provided with an overview of the topic of forgiveness including how to better understand the nature of forgiveness of others and oneself. Exploring the phases of forgiveness and how one works them was covered. Patients were able to explore how practicing forgiveness may positively impact their functioning.     Patient Session Goals / Objectives:    Discussed definitions of forgiveness and self-forgiveness    Explored the phases and process of forgiveness    Discussed benefits of forgiveness to their relationships with themselves and others, connecting the concept to mindfulness and acceptance    Assisted patients  in recognizing when practicing forgiveness may be helpful      Patient Participation / Response:  Fully participated with the group by sharing personal reflections / insights and openly received / provided feedback with other participants.    Demonstrated understanding of topics discussed through group discussion and participation, Identified / Expressed readiness to act on skill suggestions discussed in topic and Verbalized understanding of ways to proactively manage illness    Treatment Plan:  Patient has a current master individualized treatment plan.  See Epic treatment plan for more information.    Lashell Leonard

## 2021-11-11 NOTE — GROUP NOTE
Psychotherapy Group Note    PATIENT'S NAME: Joel Pineda  MRN:   [id number]  :   1975  ACCT. NUMBER: [id number]  DATE OF SERVICE: 21  START TIME:  1:00 PM  END TIME:  1:50 PM  FACILITATOR: Lashell Leonard  TOPIC: MH EBP Group: Specialty Awareness  [facility] Adult Mental Health Day Treatment  TRACK: 4B                                      Service Modality:  Video Visit     Telemedicine Visit: The patient's condition can be safely assessed and treated via synchronous audio and visual telemedicine encounter.      Reason for Telemedicine Visit:  covid 19     Originating Site (Patient Location): Patient's home    Distant Site (Provider Location): Provider Remote Setting- Home Office    Consent:  The patient/guardian has verbally consented to: the potential risks and benefits of telemedicine (video visit) versus in person care; bill my insurance or make self-payment for services provided; and responsibility for payment of non-covered services.     Patient would like the video invitation sent by:  My Chart    Mode of Communication:  Video Conference via Medical Zoom    As the provider I attest to compliance with applicable laws and regulations related to telemedicine.         NUMBER OF PARTICIPANTS: 7    Summary of Group / Topics Discussed:  Specialty Topics: Forgiveness: Patients were provided with an overview of the topic of forgiveness including how to better understand the nature of forgiveness of others and oneself. Exploring the phases of forgiveness and how one works them was covered. Patients were able to explore how practicing forgiveness may positively impact their functioning.     Patient Session Goals / Objectives:    Discussed definitions of forgiveness and self-forgiveness    Explored the phases and process of forgiveness    Discussed benefits of forgiveness to their relationships with themselves and others, connecting the concept to mindfulness and acceptance    Assisted patients in  recognizing when practicing forgiveness may be helpful      Patient Participation / Response:  Fully participated with the group by sharing personal reflections / insights and openly received / provided feedback with other participants.    Demonstrated understanding of topics discussed through group discussion and participation, Identified / Expressed readiness to act on skill suggestions discussed in topic and Verbalized understanding of ways to proactively manage illness    Treatment Plan:  Patient has a current master individualized treatment plan.  See Epic treatment plan for more information.    Lashell Leonard

## 2021-11-11 NOTE — RESULT ENCOUNTER NOTE
Bennett Francis,  Your lab results should be available for review prior to your appointment with Dr. Lyles.  Of note, your triglycerides and your cholesterol panel were very high.  He will likely want to talk with you about medication for this.  Please make sure you continue to take the Lovaza he has already prescribed.  Sincerely,  Lenore Fox MD  (for Dr. Lyles who is out of the office today)

## 2021-11-11 NOTE — GROUP NOTE
Process Group Note    PATIENT'S NAME: Joel Pineda  MRN:   [id number]  :   1975  ACCT. NUMBER: [id number]  DATE OF SERVICE: 21  START TIME:  2:00 PM  END TIME:  2:50 PM  FACILITATOR: Amy Ferro LGSW  TOPIC:  Process Group    Diagnoses:  296.33 (F33.2) Major Depressive Disorder, Recurrent Episode, Severe _.  4. Other Diagnoses that is relevant to services:   300.00 (F41.9) Unspecified Anxiety Disorder  301.83 (F60.3) Borderline Personality Disorder.         [facility] Mental Health Day Treatment  TRACK: 4B    NUMBER OF PARTICIPANTS: 7                                      Service Modality:  Video Visit     Telemedicine Visit: The patient's condition can be safely assessed and treated via synchronous audio and visual telemedicine encounter.      Reason for Telemedicine Visit: Services only offered telehealth    Originating Site (Patient Location): Patient's home    Distant Site (Provider Location): Provider Remote Setting- Home Office    Consent:  The patient/guardian has verbally consented to: the potential risks and benefits of telemedicine (video visit) versus in person care; bill my insurance or make self-payment for services provided; and responsibility for payment of non-covered services.     Patient would like the video invitation sent by:  My Chart    Mode of Communication:  Video Conference via Medical Zoom    As the provider I attest to compliance with applicable laws and regulations related to telemedicine.                Data:    Session content: At the start of this group, patients were invited to check in by identifying themselves, describing their current emotional status, and identifying issues to address in this group.   Area(s) of treatment focus addressed in this session included Symptom Management, Personal Safety and Community Resources/Discharge Planning.  Client reported increased difficulty in concentration today.  Client reported alejandro Shingles and visiting  "the Emergency Room on Tuesday after a doctor's appointment was cancelled.  Client reported increased pain and feelings of hopelessness about the pain decreasing.  \"I can't get ahead of my pain\". Client reported requesting an increase in pain medications but expressed concern over the increase risk of Suicidal ideation.  Writer provided supportive validation. Client reported \"making something positive out of yesterday\" by making Cinnamon rolls and engaging in a zoom call with his mom, step dad, and sister for his brother's birthday.  Client reported reflecting on areas of gratitude last night as a coping strategy. Client did not report any suicidal ideation, plan or intent.      Therapeutic Interventions/Treatment Strategies:  Psychotherapist offered support, feedback and validation and reinforced use of skills. Treatment modalities used include Cognitive Behavioral Therapy. Interventions include Coping Skills: Discussed use of self-soothe skills to decrease distress in the body and Symptoms Management: Promoted understanding of their diagnoses and how it impacts their functioning.    Assessment:    Patient response:   Patient responded to session by accepting feedback and listening    Possible barriers to participation / learning include: and no barriers identified    Health Issues:   None reported       Substance Use Review:   Substance Use: No active concerns identified.    Mental Status/Behavioral Observations  Appearance:   Appropriate   Eye Contact:   Good   Psychomotor Behavior: Normal   Attitude:   Cooperative  Pleasant  Orientation:   All  Speech   Rate / Production: Normal    Volume:  Normal   Mood:    Anxious  Depressed   Affect:    Appropriate   Thought Content:   Clear and Safety denies any current safety concerns including suicidal ideation, self-harm, and homicidal ideation  Thought Form:  Coherent  Logical     Insight:    Good     Plan:     Safety Plan: No current safety concerns identified.  " Recommended that patient call 911 or go to the local ED should there be a change in any of these risk factors.     Barriers to treatment: None identified    Patient Contracts (see media tab):  None    Substance Use: Not addressed in session     Continue or Discharge: Patient will continue in Adult Day Treatment (ADT)  as planned. Patient is likely to benefit from learning and using skills as they work toward the goals identified in their treatment plan.      Amy Ferro, ABHIJEET  November 11, 2021

## 2021-11-12 NOTE — ADDENDUM NOTE
Encounter addended by: Lashell Leonard on: 11/12/2021 9:34 AM   Actions taken: Charge Capture section accepted

## 2021-11-15 ENCOUNTER — HOSPITAL ENCOUNTER (OUTPATIENT)
Dept: BEHAVIORAL HEALTH | Facility: CLINIC | Age: 46
End: 2021-11-15
Attending: PSYCHIATRY & NEUROLOGY
Payer: MEDICARE

## 2021-11-15 PROCEDURE — 90853 GROUP PSYCHOTHERAPY: CPT | Mod: GT

## 2021-11-15 PROCEDURE — 90853 GROUP PSYCHOTHERAPY: CPT | Mod: PO

## 2021-11-15 NOTE — GROUP NOTE
Psychoeducation Group Note    PATIENT'S NAME: Joel Pineda  MRN:   [id number]  :   1975  ACCT. NUMBER: [id number]  DATE OF SERVICE: 11/15/21  START TIME:  2:00 PM  END TIME:  2:50 PM  FACILITATOR: Amy Ferro LGSW; Magali Dodson LICSW  TOPIC: MH RN Group: Health Maintenance  [facility] Day Treatment  TRACK: 4B    NUMBER OF PARTICIPANTS:     Summary of Group / Topics Discussed:  Health Maintenance: Eight Dimensions of Wellness: The concept of holistic health through the model of eight dimensions was introduced. Group members participated in identifying behaviors and activities in each of the dimensions of wellness.  The importance of each dimension was reinforced and the concept of balance in life as it relates to wellness was explored.      Patient Session Goals / Objectives:    Verbalized understanding of balance in wellness and how it relates to their life    Identified and explained the eight dimensions of wellness    Categorized activities and wellness needs into corresponding dimensions appropriately during exercise                                        Service Modality:  Video Visit     Telemedicine Visit: The patient's condition can be safely assessed and treated via synchronous audio and visual telemedicine encounter.      Reason for Telemedicine Visit: Services only offered telehealth    Originating Site (Patient Location): Patient's home    Distant Site (Provider Location): Provider Remote Setting- Home Office    Consent:  The patient/guardian has verbally consented to: the potential risks and benefits of telemedicine (video visit) versus in person care; bill my insurance or make self-payment for services provided; and responsibility for payment of non-covered services.     Patient would like the video invitation sent by:  My Chart    Mode of Communication:  Video Conference via Medical Zoom    As the provider I attest to compliance with applicable laws and  regulations related to telemedicine.            Patient Participation / Response:  Fully participated with the group by sharing personal reflections / insights and openly received / provided feedback with other participants.    Demonstrated understanding of topics discussed through group discussion and participation    Treatment Plan:  Patient has a current master individualized treatment plan.  See Epic treatment plan for more information.    Amy Ferro LGSW

## 2021-11-15 NOTE — GROUP NOTE
"Process Group Note    PATIENT'S NAME: Joel Pineda  MRN:   [id number]  :   1975  ACCT. NUMBER: [id number]  DATE OF SERVICE: 11/15/21  START TIME:  1:00 PM  END TIME:  1:50 PM  FACILITATOR: Amy Ferro LGSW; Magali [facility]son [facility]  TOPIC:  Process Group    Diagnoses:  296.33 (F33.2) Major Depressive Disorder, Recurrent Episode, Severe _.  4. Other Diagnoses that is relevant to services:   300.00 (F41.9) Unspecified Anxiety Disorder  301.83 (F60.3) Borderline Personality Disorder.         [facility] Day [facility]  TRACK: 4B    NUMBER OF PARTICIPANTS:                                       Service Modality:  Video Visit     Telemedicine Visit: The patient's condition can be safely assessed and treated via synchronous audio and visual telemedicine encounter.      Reason for Telemedicine Visit: Services only offered telehealth    Originating Site (Patient Location): Patient's home    Distant Site (Provider Location): Provider Remote Setting- Home Office    Consent:  The patient/guardian has verbally consented to: the potential risks and benefits of telemedicine (video visit) versus in person care; bill my insurance or make self-payment for services provided; and responsibility for payment of non-covered services.     Patient would like the video invitation sent by:  My Chart    Mode of Communication:  Video Conference via Medical Zoom    As the provider I attest to compliance with applicable laws and regulations related to telemedicine.                Data:    Session content: At the start of this group, patients were invited to check in by identifying themselves, describing their current emotional status, and identifying issues to address in this group.   Area(s) of treatment focus addressed in this session included Symptom Management, Personal Safety and Community Resources/Discharge Planning.  Client reported having a \"lonely\" and \"boring\" weekend, despite an outing to [employer] he had with a friend on Friday.  Client reported initiating plans for this outing and that they played Backgammon while listening to music on their phones.  Client reports not seeing this friend in person since before the pandemic.  Client would like to move his discharge date up to Thursday 11/18/21 as he is anticipating medical appointments to increase related to his neurology concern.  \"It's a good place to pause\".  Client expressed interest in Clinic.  Co-facilitator agreed to put client on the waitlist.  Co-faciltiator asked client about individual therapy to which client reported reconnecting with Dariela ([facility]) to meet with weekly after he discharges from group.  Client also reported increasing his Latuda from 40 to 60 mg. Client did not report any suicidal ideation, plan or intent.          Therapeutic Interventions/Treatment Strategies:  Psychotherapist offered support, feedback and validation and reinforced use of skills. Treatment modalities used include Cognitive Behavioral Therapy. Interventions include Other: Discussed discharge planning and Relationship Skills: Discussed ways to increase socialization.    Assessment:    Patient response:   Patient responded to session by accepting feedback and listening    Possible barriers to participation / learning include: and no barriers identified    Health Issues:   None reported       Substance Use Review:   Substance Use: No active concerns identified.    Mental Status/Behavioral Observations  Appearance:   Appropriate   Eye Contact:   Good   Psychomotor Behavior: Normal   Attitude:   Cooperative  Interested Pleasant  Orientation:   All  Speech   Rate / Production: Normal    Volume:  Normal   Mood:    Anxious  Depressed   Affect:    Appropriate   Thought Content:   Clear and Safety denies any current safety concerns including suicidal ideation, self-harm, and homicidal ideation  Thought Form:  Coherent  Logical     Insight:    Good "     Plan:     Safety Plan: No current safety concerns identified.  Recommended that patient call 911 or go to the local ED should there be a change in any of these risk factors.     Barriers to treatment: None identified    Patient Contracts (see media tab):  None    Substance Use: Not addressed in session     Continue or Discharge: Patient will continue in Adult Day Treatment (ADT)  as planned. Patient is likely to benefit from learning and using skills as they work toward the goals identified in their treatment plan.      ABHIJEET Salas  November 15, 2021

## 2021-11-15 NOTE — GROUP NOTE
Psychotherapy Group Note    PATIENT'S NAME: Joel Pineda  MRN:   [id number]  :   1975  ACCT. NUMBER: [id number]  DATE OF SERVICE: 11/15/21  START TIME:  3:00 PM  END TIME:  3:50 PM  FACILITATOR: Lashell Leonard  TOPIC: MH EBP Group: Relationship Skills  [facility] Adult Mental Health Day Treatment  TRACK: 4B                                      Service Modality:  Video Visit     Telemedicine Visit: The patient's condition can be safely assessed and treated via synchronous audio and visual telemedicine encounter.      Reason for Telemedicine Visit: Services only offered telehealth    Originating Site (Patient Location): Patient's home    Distant Site (Provider Location): Provider Remote Setting- Home Office    Consent:  The patient/guardian has verbally consented to: the potential risks and benefits of telemedicine (video visit) versus in person care; bill my insurance or make self-payment for services provided; and responsibility for payment of non-covered services.     Patient would like the video invitation sent by:  My Chart    Mode of Communication:  Video Conference via Medical Zoom    As the provider I attest to compliance with applicable laws and regulations related to telemedicine.          NUMBER OF PARTICIPANTS: 8    Summary of Group / Topics Discussed:  Relationship Skills: Boundaries: Patients were provided with a general overview of interpersonal boundaries and how lack of boundaries relates to symptoms and functioning. The purpose is to help patients identify boundary issues and gain awareness and skills to work towards healthier interpersonal boundaries. Current awareness of healthy boundary characteristics and barriers to establishing healthy boundaries were discussed.    Patient Session Goals / Objectives:    Familiarized patients with the concept of interpersonal boundaries and their characteristics    Discussed and practiced strategies to promote healthier interpersonal  boundaries    Identified boundary issues and identified plan to improve boundaries      Patient Participation / Response:  Fully participated with the group by sharing personal reflections / insights and openly received / provided feedback with other participants.    Demonstrated understanding of topics discussed through group discussion and participation, Demonstrated understanding of relationship skills and communication skills and Identified / Expressed personal readiness to incorporate effective communication skills    Treatment Plan:  Patient has a current master individualized treatment plan.  See Epic treatment plan for more information.    Lashell Leonard

## 2021-11-16 ENCOUNTER — VIRTUAL VISIT (OUTPATIENT)
Dept: FAMILY MEDICINE | Facility: CLINIC | Age: 46
End: 2021-11-16
Payer: MEDICARE

## 2021-11-16 ENCOUNTER — HOSPITAL ENCOUNTER (OUTPATIENT)
Dept: BEHAVIORAL HEALTH | Facility: CLINIC | Age: 46
End: 2021-11-16
Attending: PSYCHIATRY & NEUROLOGY
Payer: MEDICARE

## 2021-11-16 DIAGNOSIS — M51.369 DDD (DEGENERATIVE DISC DISEASE), LUMBAR: Primary | ICD-10-CM

## 2021-11-16 DIAGNOSIS — E78.1 HYPERTRIGLYCERIDEMIA: ICD-10-CM

## 2021-11-16 DIAGNOSIS — E11.8 TYPE 2 DIABETES MELLITUS WITH COMPLICATION, WITHOUT LONG-TERM CURRENT USE OF INSULIN (H): ICD-10-CM

## 2021-11-16 DIAGNOSIS — M45.8 ANKYLOSING SPONDYLITIS OF SACRAL REGION (H): ICD-10-CM

## 2021-11-16 DIAGNOSIS — F11.90 CHRONIC, CONTINUOUS USE OF OPIOIDS: ICD-10-CM

## 2021-11-16 PROCEDURE — 90853 GROUP PSYCHOTHERAPY: CPT | Mod: PO

## 2021-11-16 PROCEDURE — 90853 GROUP PSYCHOTHERAPY: CPT | Mod: GT

## 2021-11-16 PROCEDURE — 99214 OFFICE O/P EST MOD 30 MIN: CPT | Mod: 95 | Performed by: FAMILY MEDICINE

## 2021-11-16 RX ORDER — OXYCODONE HYDROCHLORIDE 5 MG/1
5 TABLET ORAL EVERY 6 HOURS PRN
Qty: 35 TABLET | Refills: 0 | Status: SHIPPED | OUTPATIENT
Start: 2021-11-16 | End: 2021-11-29

## 2021-11-16 RX ORDER — METHOCARBAMOL 500 MG/1
500-1000 TABLET, FILM COATED ORAL 4 TIMES DAILY PRN
Qty: 240 TABLET | Refills: 1 | Status: SHIPPED | OUTPATIENT
Start: 2021-11-16 | End: 2022-03-02

## 2021-11-16 NOTE — PROGRESS NOTES
Tito is a 46 year old who is being evaluated via a billable video visit.      How would you like to obtain your AVS? MyChart  If the video visit is dropped, the invitation should be resent by: Text to cell phone: 1  Will anyone else be joining your video visit? No      Video Start Time: 1731    Assessment & Plan     DDD (degenerative disc disease), lumbar  Known issues of degenerative lumbar disease.  Symptoms of peripheral neuropathy have worsened in the last few months and interval history is reviewed with patient and in his chart.  Significant bilateral deep peroneal nerve neuropathy felt to be related to L5-S1 level.  Actually has a meeting with another neurosurgeon next week and we will touch base afterward.  The question is whether further procedure needs to be done or how to deal with this.  In the meantime he continues to have some significant pain issues and we may increase Lyrica though it does cause him some fogginess.  - oxyCODONE (ROXICODONE) 5 MG tablet; Take 1 tablet (5 mg) by mouth every 6 hours as needed for pain (maximum 6 tablet(s) per day)  - methocarbamol (ROBAXIN) 500 MG tablet; Take 1-2 tablets (500-1,000 mg) by mouth 4 times daily as needed for muscle spasms    Chronic, continuous use of opioids  Up-to-date on routine monitoring and this serves as his 3-month visit    Ankylosing spondylitis of sacral region (H)  As above.  Working with Dr. Baxter on how to deal with this.  - oxyCODONE (ROXICODONE) 5 MG tablet; Take 1 tablet (5 mg) by mouth every 6 hours as needed for pain (maximum 6 tablet(s) per day)    Type 2 diabetes mellitus with complication, without long-term current use of insulin (H)  Now off of glipizide as this was making him hypoglycemic and Metformin gave him GI upset.  So we should recheck at some point in the next month or 2 and if going above 6.5 could consider another agent perhaps Actos or Januvia  - Hemoglobin A1c; Future    Hypertriglyceridemia  Triglycerides are  "elevated as well.  I would like to defer this at this point but he has been on TriCor in the past.  Some concerns about renal function which has been normal recently.         BMI:   Estimated body mass index is 36.75 kg/m  as calculated from the following:    Height as of 8/18/21: 1.981 m (6' 6\").    Weight as of 10/13/21: 144.2 kg (318 lb).   Weight management plan: Discussed healthy diet and exercise guidelines    See Patient Instructions    Return in about 1 week (around 11/23/2021) for Based upon test results.    Ksenia Lyles MD  [facility] NAWAF Francis is a 46 year old who presents for the following health issues     HPI     Video visit with patient today primarily to follow-up on pain medication.  Reviewed interval history.  Patient has a meeting with neurosurgery next week.  Also follow-up on diabetes and triglycerides.    Review of Systems   Constitutional, HEENT, cardiovascular, pulmonary, gi and gu systems are negative, except as otherwise noted.      Objective           Vitals:  No vitals were obtained today due to virtual visit.    Physical Exam   GENERAL: Healthy, alert and no distress  EYES: Eyes grossly normal to inspection.  No discharge or erythema, or obvious scleral/conjunctival abnormalities.  RESP: No audible wheeze, cough, or visible cyanosis.  No visible retractions or increased work of breathing.    SKIN: Visible skin clear. No significant rash, abnormal pigmentation or lesions.  NEURO: Cranial nerves grossly intact.  Mentation and speech appropriate for age.  PSYCH: Mentation appears normal, affect normal/bright, judgement and insight intact, normal speech and appearance well-groomed.    Past labs reviewed with the patient.   Reviewed previous imaging.            Video-Visit Details    Type of service:  Video Visit    Video End Time:1745    Originating Location (pt. Location): Home    Distant Location (provider location):  [facility] " L2C     Platform used for Video Visit: [employer]   Answers for HPI/ROS submitted by the patient on 11/9/2021  If you checked off any problems, how difficult have these problems made it for you to do your work, take care of things at home, or get along with other people?: Somewhat difficult  PHQ9 TOTAL SCORE: 10  How many servings of fruits and vegetables do you eat daily?: 0-1  On average, how many sweetened beverages do you drink each day (Examples: soda, juice, sweet tea, etc.  Do NOT count diet or artificially sweetened beverages)?: 2  How many minutes a day do you exercise enough to make your heart beat faster?: 9 or less  How many days a week do you exercise enough to make your heart beat faster?: 3 or less  How many days per week do you miss taking your medication?: 0

## 2021-11-16 NOTE — GROUP NOTE
"Process Group Note    PATIENT'S NAME: Joel Pineda  MRN:   [id number]  :   1975  ACCT. NUMBER: [id number]  DATE OF SERVICE: 21  START TIME:  1:00 PM  END TIME:  1:50 PM  FACILITATOR: Amy Ferro LGSW; Magali [facility]son [facility]  TOPIC:  Process Group    Diagnoses:  296.33 (F33.2) Major Depressive Disorder, Recurrent Episode, Severe _.  4. Other Diagnoses that is relevant to services:   300.00 (F41.9) Unspecified Anxiety Disorder  301.83 (F60.3) Borderline Personality Disorder.         [facility] Day Treatment  TRACK: 4B    NUMBER OF PARTICIPANTS: 7                                      Service Modality:  Video Visit     Telemedicine Visit: The patient's condition can be safely assessed and treated via synchronous audio and visual telemedicine encounter.      Reason for Telemedicine Visit: Services only offered telehealth    Originating Site (Patient Location): Patient's home    Distant Site (Provider Location): Provider Remote Setting- Home Office    Consent:  The patient/guardian has verbally consented to: the potential risks and benefits of telemedicine (video visit) versus in person care; bill my insurance or make self-payment for services provided; and responsibility for payment of non-covered services.     Patient would like the video invitation sent by:  My Chart    Mode of Communication:  Video Conference via Medical Zoom    As the provider I attest to compliance with applicable laws and regulations related to telemedicine.                Data:    Session content: At the start of this group, patients were invited to check in by identifying themselves, describing their current emotional status, and identifying issues to address in this group.   Area(s) of treatment focus addressed in this session included Symptom Management, Personal Safety and Community Resources/Discharge Planning.  Client reported concern about a \"confrontation\" at an upcoming medication check in with " "his PCP.  Client explained that he had to temporarily increase his intake of narcotics due to pain from physical therapy and that as a result, his PCP did not renew his prescription for the \"usual dose\" of pain pills.  Client reported feeling better after hearing from Dr. Tan that his current dose was \"low\".  Client reported that he is unable to sit or stand for more than a few minutes.  Client reports frustration that he needs to take medication \"to feel normal\".  Client expressed ambivalence with discharging on Thursday 11/18 and but noted that it \"needed\" to happen due to increased medical appointments.  Writer reminded client that he was on the waitlist for clinic.  Client did not report any suicidal ideation, plan or intent.      Therapeutic Interventions/Treatment Strategies:  Psychotherapist offered support, feedback and validation and reinforced use of skills. Treatment modalities used include Cognitive Behavioral Therapy. Interventions include Symptoms Management: Promoted understanding of their diagnoses and how it impacts their functioning.    Assessment:    Patient response:   Patient responded to session by accepting feedback, giving feedback and listening    Possible barriers to participation / learning include: and no barriers identified    Health Issues:   Yes: Pain, Associated Psychological Distress       Substance Use Review:   Substance Use: No active concerns identified.    Mental Status/Behavioral Observations  Appearance:   Appropriate   Eye Contact:   Good   Psychomotor Behavior: Normal   Attitude:   Cooperative  Interested Pleasant  Orientation:   All  Speech   Rate / Production: Normal    Volume:  Normal   Mood:    Anxious  Depressed   Affect:    Appropriate   Thought Content:   Clear and Safety denies any current safety concerns including suicidal ideation, self-harm, and homicidal ideation  Thought Form:  Coherent  Logical     Insight:    Good     Plan:     Safety Plan: No current " safety concerns identified.  Recommended that patient call 911 or go to the local ED should there be a change in any of these risk factors.     Barriers to treatment: None identified    Patient Contracts (see media tab):  None    Substance Use: Not addressed in session     Continue or Discharge: Patient will continue in Adult Day Treatment (ADT)  as planned. Patient is likely to benefit from learning and using skills as they work toward the goals identified in their treatment plan.      ABHIJEET Salas  November 16, 2021

## 2021-11-16 NOTE — GROUP NOTE
Psychotherapy Group Note    PATIENT'S NAME: Joel Pineda  MRN:   [id number]  :   1975  ACCT. NUMBER: [id number]  DATE OF SERVICE: 21  START TIME:  3:00 PM  END TIME:  3:50 PM  FACILITATOR: Lashell Leonard  TOPIC: MH EBP Group: Relationship Skills  [facility] Adult Mental Health Day Treatment  TRACK: 4b                                      Service Modality:  Video Visit     Telemedicine Visit: The patient's condition can be safely assessed and treated via synchronous audio and visual telemedicine encounter.      Reason for Telemedicine Visit: Services only offered telehealth    Originating Site (Patient Location): Patient's home    Distant Site (Provider Location): Provider Remote Setting- Home Office    Consent:  The patient/guardian has verbally consented to: the potential risks and benefits of telemedicine (video visit) versus in person care; bill my insurance or make self-payment for services provided; and responsibility for payment of non-covered services.     Patient would like the video invitation sent by:  My Chart    Mode of Communication:  Video Conference via Medical Zoom    As the provider I attest to compliance with applicable laws and regulations related to telemedicine.          NUMBER OF PARTICIPANTS: 6    Summary of Group / Topics Discussed:  Relationship Skills: Basic Communication: FATEMEH SKILL. Patients were provided with a general overview of interpersonal communication skills and information about how communication skills impact symptoms and functioning. The goal of this topic is to help patients identify communication issues and gain skills to work towards healthier interpersonal communication. Patients reviewed their current awareness of communication issues and how communication skills impact relationships and functioning.      Patient Session Goals / Objectives:    Identified and discussed patients individual challenges with basic communication    Presented and  practiced effective communication skills in session    Assisted patients in implementing more effective communication skills in their relationships      Patient Participation / Response:  Fully participated with the group by sharing personal reflections / insights and openly received / provided feedback with other participants.    Demonstrated understanding of topics discussed through group discussion and participation, Demonstrated understanding of relationship skills and communication skills and Identified / Expressed personal readiness to incorporate effective communication skills    Treatment Plan:  Patient has a current master individualized treatment plan.  See Epic treatment plan for more information.    Lashell Leonard

## 2021-11-17 ENCOUNTER — HOSPITAL ENCOUNTER (OUTPATIENT)
Dept: BEHAVIORAL HEALTH | Facility: CLINIC | Age: 46
End: 2021-11-17
Attending: PSYCHIATRY & NEUROLOGY
Payer: MEDICARE

## 2021-11-17 DIAGNOSIS — F41.1 GENERALIZED ANXIETY DISORDER: ICD-10-CM

## 2021-11-17 DIAGNOSIS — F60.3 BORDERLINE PERSONALITY DISORDER (H): ICD-10-CM

## 2021-11-17 PROBLEM — F33.2 MAJOR DEPRESSIVE DISORDER, RECURRENT EPISODE, SEVERE (H): Status: RESOLVED | Noted: 2020-12-02 | Resolved: 2021-11-17

## 2021-11-17 PROBLEM — F33.2 MDD (MAJOR DEPRESSIVE DISORDER), RECURRENT SEVERE, WITHOUT PSYCHOSIS (H): Status: RESOLVED | Noted: 2021-07-21 | Resolved: 2021-11-17

## 2021-11-17 NOTE — PROGRESS NOTES
"[facility]   Adult Mental Health Outpatient Programs  Provider Interval History Note    Program: Adult Day Treatment    Track: 4B    PATIENT'S NAME: Joel Pineda  MRN:   [id number]  :   1975  ACCT. NUMBER: [id number]  DATE OF SERVICE: 21  CALL/VIDEO START TIME: 1031  CALL/VIDEO END TIME: 1046      Outpatient Providers:  Psychiatrist/Provider: Dr. De Dios   Psychotherapist: new provider, Soledad Valle at [facility]      Interval History:  \"I'm all right.\" Tito presents for today for follow-up and ongoing program supervision. Endorses:     PCP visit last night, covered a lot of ground (lipids, upcoming neurosurgery visit next week)    Tomorrow will be last day in program, ending early due to upcoming possible procedure, etc.    Will resume with individual therapist who has DBT focus, which he feels will be a good fit    Has been struggling with movement/pain    Lurasidone now 60 mg, no adverse effects     Recent episode of shingles    Symptoms:    \"I'm managing\"    Some days \"excruciating loneliness but reconnecting with friends\"    Substance use:    none    Reactions/thoughts about program:    Feels it has been helpful    Ending IOP per above    On wait list for clinic      Risk Assessment:    Suicidal ideation: denies current or recent suicidal ideation or behavior, \"more of the hopelessness\"    Thoughts of non-suicidal self-injury: denied    Recent self-injurious behavior: denied    Homicidal ideation: denied    Other safety concerns: denied      Medications:  Medications reviewed with patient today. Patient is taking medications as prescribed.     Adverse effects: No significant side effects      Laboratory Results:  Most recent laboratory results reviewed and the pertinent results include: Elevations in triglycerides, low HDL on 11/10      Metrics:  PHQ-9 scores:   PHQ-9 SCORE 2021   PHQ-9 Total Score - - - -   PHQ-9 Total " "Score MyChart - - - 10 (Moderate depression)   PHQ-9 Total Score 10 10 10 10   PHQ-9 Total Score - - - -       YUDI-7 scores:   YUDI-7 SCORE 7/19/2021 7/20/2021 9/1/2021   Total Score - - -   Total Score 9 (mild anxiety) 9 (mild anxiety) 14 (moderate anxiety)   Total Score 9 9 14   Total Score BEH Adult - - -       CSSR-S: No flowsheet data found.      Mental Status Examination (limited due to video virtual visit format):  Vital Signs: There were no vitals taken for this virtual visit.  Appearance: appropriately groomed, appears stated age, and in mild distress.  Attitude: cooperative   Eye Contact: good to the extent that can be determined in a video visit  Muscle Strength and Tone: no gross abnormalities based on remote observation  Psychomotor Behavior:  no evidence of tardive dyskinesia, dystonia, or tics based on remote observation  Gait and Station: normal, no gross abnormalities based on remote observation  Speech: clear, coherent, normal prosody, regular rate, regular rhythm and fluent  Associations: No loosening of associations  Thought Process: coherent and goal directed  Thought Content: no evidence of suicidal ideation or homicidal ideation, no evidence of psychotic thought, no auditory hallucinations present and no visual hallucinations present  Mood: \"better\"  Affect: mood congruent, intensity is blunted, constricted mobility, restricted range and reactive  Insight: good  Judgment: intact, adequate for safety  Impulse Control: intact  Oriented to: time, place, person and situation  Attention Span and Concentration: normal  Language: Intact  Recent and Remote Memory: intact to interview. Not formally assessed. No amnesia.  Fund of Knowledge: appropriate        Diagnosis/es:  296.89 Bipolar II Disorder Depressed and moderate  300.02 (F41.1) Generalized Anxiety Disorder  301.83 (F60.3) Borderline Personality Disorder      Assessment:  Tito presents today for follow up. Endorses making good use of skills " gained in the program, had will be ending the program early for logistical reasons.  Has established with a new therapist, will be seeing her on a weekly basis and is waitlisted for our once weekly clinic for additional support.  Medications managed by outpatient psychiatry.      Bipolar disorder  o Overall stable   o No severe mood symptoms reported  o Mood adversely impacted by pain  o Recently increase lurasidone to 60 mg daily from 40 mg, tolerating well  o No change to medications today      Generalized anxiety disorder  o Overall stable   o Also likely exacerbated by pain  o Managed by outpatient; no change to medications today      Borderline personality disorder  o Overall stable   o We will be continuing DBT work on an individual basis following discharge from our program  o Psychotherapy is treatment of choice  o No change to medications      Ongoing programmatic care:  o While he continues to meet criteria for treatment, he will be discharging early as discussed above  o Stepping down to once weekly clinic is appropriate and will help maintain and consolidate treatment gains      I feel this patient does not meet criteria for an involuntary hold and is appropriate for treatment at an outpatient level of care.      Treatment Plan:  Medications:    Continue at current dose/schedule:   o Clonazepam  o Duloxetine  o Lurasidone  o Melatonin    Continue all other medications as reviewed per electronic medical record today    Continue therapy as planned:    Enrolled in Adult Day Treatment, stepping down to clinic per above    Continue with individual therapist as appropriate    Safety plan reviewed:    To the Emergency Department as needed or call after hours crisis line at [phone] or [phone]. [place] Crisis Text Line: Text [place] to 509742 or Suicide LifeLine Chat: suicidepreventionlifeline.org/chat    Follow-up:     Follow up with outpatient provider as planned or sooner if needed for acute medical  concerns.    Questions or concerns:    Call the psychiatric nurse line with medication questions or concerns at [phone].    [employer] may be used to communicate with your provider, but this is not intended to be used for emergencies.        Treatment Objective(s) Addressed in This Session:  The purpose of today's virtual visit is for this writer to provide oversight of patient's care while receiving program services. Specific treatment goals addressed included personal safety, symptoms stabilization and management, wellness and mental health, and community resources/discharge planning.     This author or another program provider will follow up with the patient as noted above.     Patient continues to meet criteria for recommended level of care: Adult Day Treatment, stepping down to weekly clinic  Patient would be at reasonable risk of requiring a higher level of care in the absence of current services.    Patient agrees with the current plan of care.    Joel Tan MD  11/17/21      Visit Details:  Type of service:  Video Visit    Start/End Time: see above    Originating Location (pt. Location): Home in [place]    Distant Location (provider location): Provider Remote Setting- Home Office    Platform used for Video Visit: Sophia    Physician has received verbal consent for a Video Visit from the patient? Yes    25 minutes spent on the date of the encounter doing chart review, patient visit and documentation     This document completed in part using Dragon Medical One dictation software.  Please excuse any inadvertent word or phrase substitutions.

## 2021-11-18 ENCOUNTER — HOSPITAL ENCOUNTER (OUTPATIENT)
Dept: BEHAVIORAL HEALTH | Facility: CLINIC | Age: 46
End: 2021-11-18
Attending: PSYCHIATRY & NEUROLOGY
Payer: MEDICARE

## 2021-11-18 DIAGNOSIS — F60.3 BORDERLINE PERSONALITY DISORDER (H): ICD-10-CM

## 2021-11-18 PROCEDURE — 90853 GROUP PSYCHOTHERAPY: CPT | Mod: GT

## 2021-11-18 NOTE — PROGRESS NOTES
LOCUS Worksheet     Name: Joel Pineda MRN: [id number]    : 1975      Gender:  male    PMI:  N/A   Provider Name: Dr. Tan  Provider NPI:  [id number]    Actual level of Care Provided:  Adult Day Treatment - Intensive Outpatient     Service(s) receiving or referred to:  Clinic 1    Reason for Variance: Client discharging early from ADT due accommodate increased medical appointments.       Rating completed by: ABHIJEET Salas on 2021 at 5:26 PM        I. Risk of Harm:   1      Minimal Risk of Harm    II. Functional Status:   2      Mild Impairment    III. Co-Morbidity:   3      Significant Co-Morbidity    IV - A. Recovery Environment - Level of Stress:   1      Low Stress Environment    IV - B. Recovery Environment - Level of Support:   2      Supportive Environment    V. Treatment and Recovery History:   2      Significant Response to Treatment and Recovery Management    VI. Engagement and Recovery Project:   2      Positive Engagement and Recovery       13 Composite Score    Level of Care Recommendation:   10 to 13       Recovery Maintenance Health Maintenance

## 2021-11-18 NOTE — ADDENDUM NOTE
Encounter addended by: Lashell Leonard on: 11/18/2021 5:02 PM   Actions taken: Charge Capture section accepted

## 2021-11-18 NOTE — GROUP NOTE
Psychotherapy Group Note    PATIENT'S NAME: Joel Pineda  MRN:   [id number]  :   1975  ACCT. NUMBER: [id number]  DATE OF SERVICE: 21  START TIME:  3:00 PM  END TIME:  3:50 PM  FACILITATOR: Lashell Leonard  TOPIC: MH EBP Group: [facility] Adult Mental Health Day Treatment  TRACK: 4B                                      Service Modality:  Video Visit     Telemedicine Visit: The patient's condition can be safely assessed and treated via synchronous audio and visual telemedicine encounter.      Reason for Telemedicine Visit:  covid 19     Originating Site (Patient Location): Patient's home    Distant Site (Provider Location): Provider Remote Setting- Home Office    Consent:  The patient/guardian has verbally consented to: the potential risks and benefits of telemedicine (video visit) versus in person care; bill my insurance or make self-payment for services provided; and responsibility for payment of non-covered services.     Patient would like the video invitation sent by:  My Chart    Mode of Communication:  Video Conference via Medical Zoom    As the provider I attest to compliance with applicable laws and regulations related to telemedicine.         NUMBER OF PARTICIPANTS: 7    Summary of Group / Topics Discussed:  Mindfulness: Mindfulness Skills: Patients received information on the main components of mindfulness. Patients participated in discussion on how to practice observing, describing, and participating in internal and external environments. Relevance of mindfulness skills to overall mental and physical health was explored.  Patients explored and discussed in group their current awareness and knowledge of mindfulness skills as well as barriers to applying skills.    Patient Session Goals / Objectives:    Demonstrated and verbalized understanding of key mindfulness concepts    Identified when/how to use mindfulness skills    Resolved barriers to practicing mindfulness  skills    Identified plan to use mindfulness skills in daily life       Patient Participation / Response:  Fully participated with the group by sharing personal reflections / insights and openly received / provided feedback with other participants.    Demonstrated understanding of topics discussed through group discussion and participation, Demonstrated understanding of mindfulness skills and benefits of practice and Identified / Expressed personal readiness to practice mindfulness skills    Treatment Plan:  Patient has a current master individualized treatment plan.  See Epic treatment plan for more information.    Lashell Leonard

## 2021-11-18 NOTE — GROUP NOTE
Psychoeducation Group Note    PATIENT'S NAME: Joel Pineda  MRN:   [id number]  :   1975  ACCT. NUMBER: [id number]  DATE OF SERVICE: 21  START TIME:  1:00 PM  END TIME:  1:50 PM  FACILITATOR: Lashell Leonard  TOPIC: MH RN Group: Mental Health Maintenance  [facility] Adult Mental Health Day Treatment  TRACK: 4B                                      Service Modality:  Video Visit     Telemedicine Visit: The patient's condition can be safely assessed and treated via synchronous audio and visual telemedicine encounter.      Reason for Telemedicine Visit:  covid 19     Originating Site (Patient Location): Patient's home    Distant Site (Provider Location): Provider Remote Setting- Home Office    Consent:  The patient/guardian has verbally consented to: the potential risks and benefits of telemedicine (video visit) versus in person care; bill my insurance or make self-payment for services provided; and responsibility for payment of non-covered services.     Patient would like the video invitation sent by:  My Chart    Mode of Communication:  Video Conference via Medical Zoom    As the provider I attest to compliance with applicable laws and regulations related to telemedicine.          NUMBER OF PARTICIPANTS: 8    Summary of Group / Topics Discussed:  Mental Health Maintenance:  Vulnerability: In this group, the concept of vulnerability was explored through the viewing, discussion, and self-reflection of the Len Womcak Talk Titled,  The Power of Vulnerability.      Patient Session Goals / Objectives:  ? Defined and described definition of vulnerability   ? Identified 2 or more ways of practicing authenticity         Patient Participation / Response:  Fully participated with the group by sharing personal reflections / insights and openly received / provided feedback with other participants.    Demonstrated understanding of topics discussed through group discussion and participation, Identified /  Expressed personal readiness to practice skills and Verbalized understanding of mental health maintenance topic    Treatment Plan:  Patient has a current master individualized treatment plan.  See Epic treatment plan for more information.    Lashell Leonard

## 2021-11-18 NOTE — GROUP NOTE
"Process Group Note    PATIENT'S NAME: Joel Pineda  MRN:   [id number]  :   1975  ACCT. NUMBER: [id number]  DATE OF SERVICE: 21  START TIME:  2:00 PM  END TIME:  2:50 PM  FACILITATOR: Amy Ferro LGSW; Magali Dodson [facility]  TOPIC:  Process Group    Diagnoses:  296.33 (F33.2) Major Depressive Disorder, Recurrent Episode, Severe _.  4. Other Diagnoses that is relevant to services:   300.00 (F41.9) Unspecified Anxiety Disorder  301.83 (F60.3) Borderline Personality Disorder.         [facility] Mental Health Day Treatment  TRACK: 4B    NUMBER OF PARTICIPANTS:                                       Service Modality:  Video Visit     Telemedicine Visit: The patient's condition can be safely assessed and treated via synchronous audio and visual telemedicine encounter.      Reason for Telemedicine Visit: Services only offered telehealth    Originating Site (Patient Location): Patient's home    Distant Site (Provider Location): Provider Remote Setting- Home Office    Consent:  The patient/guardian has verbally consented to: the potential risks and benefits of telemedicine (video visit) versus in person care; bill my insurance or make self-payment for services provided; and responsibility for payment of non-covered services.     Patient would like the video invitation sent by:  My Chart    Mode of Communication:  Video Conference via Medical Zoom    As the provider I attest to compliance with applicable laws and regulations related to telemedicine.                Data:    Session content: At the start of this group, patients were invited to check in by identifying themselves, describing their current emotional status, and identifying issues to address in this group.   Area(s) of treatment focus addressed in this session included Symptom Management, Personal Safety and Community Resources/Discharge Planning.  Client reported having a \"weird day\" yesterday, leaving his mind in a \"blither\".  Client " "explained that he found a Borderline Personality Diagnosis on his records after a meeting he had with Dr. Tan.  Client stated that the discovery worsened his mood.  Client explained that he wishes Dr. Tan would have discussed the diagnosis with him so that client could ask follow up questions.  Client requested program staff to reach out to Dr. Tan to share this feedback.  Writer validated client's feelings and agreed to pass this information on.  Co-facilitator reminded client that he was on the waitlist for Clinic and could talk with Dr. Tan about the diagnosis then.  Client reported feeling overwhelmed \"with healthcare stuff\".  Peers offered words of appreciation and best wishes for client on his last day.    Client did not report any suicidal ideation, plan or intent.      Therapeutic Interventions/Treatment Strategies:  Psychotherapist offered support, feedback and validation and reinforced use of skills. Treatment modalities used include Cognitive Behavioral Therapy. Interventions include Symptoms Management: Promoted understanding of their diagnoses and how it impacts their functioning and Other: Discharge Planning.    Assessment:    Patient response:   Patient responded to session by accepting feedback, giving feedback and listening    Possible barriers to participation / learning include: and no barriers identified    Health Issues:   Yes: Neurological Issues, Associated Psychological Distress       Substance Use Review:   Substance Use: No active concerns identified.    Mental Status/Behavioral Observations  Appearance:   Appropriate   Eye Contact:   Good   Psychomotor Behavior: Normal   Attitude:   Cooperative  Interested Friendly Pleasant  Orientation:   All  Speech   Rate / Production: Normal    Volume:  Normal   Mood:    Anxious  Depressed   Affect:    Appropriate   Thought Content:   Clear and Safety denies any current safety concerns including suicidal ideation, self-harm, " and homicidal ideation  Thought Form:  Coherent  Logical     Insight:    Good     Plan:     Safety Plan: No current safety concerns identified.  Recommended that patient call 911 or go to the local ED should there be a change in any of these risk factors.     Barriers to treatment: None identified    Patient Contracts (see media tab):  None    Substance Use: Not addressed in session     Continue or Discharge: Patient is being discharged today. See Treatment Plan and Discharge Summary.       ABHIJEET Salas  November 18, 2021

## 2021-11-23 ENCOUNTER — PRE VISIT (OUTPATIENT)
Dept: NEUROSURGERY | Facility: CLINIC | Age: 46
End: 2021-11-23

## 2021-11-23 ENCOUNTER — OFFICE VISIT (OUTPATIENT)
Dept: NEUROSURGERY | Facility: CLINIC | Age: 46
End: 2021-11-23
Payer: MEDICARE

## 2021-11-23 VITALS
DIASTOLIC BLOOD PRESSURE: 87 MMHG | SYSTOLIC BLOOD PRESSURE: 133 MMHG | RESPIRATION RATE: 16 BRPM | BODY MASS INDEX: 36.4 KG/M2 | WEIGHT: 315 LBS | OXYGEN SATURATION: 96 % | HEART RATE: 77 BPM

## 2021-11-23 DIAGNOSIS — G62.9 PERIPHERAL POLYNEUROPATHY: Primary | ICD-10-CM

## 2021-11-23 PROCEDURE — 99213 OFFICE O/P EST LOW 20 MIN: CPT | Performed by: NEUROLOGICAL SURGERY

## 2021-11-23 RX ORDER — ERGOCALCIFEROL 1.25 MG/1
CAPSULE ORAL
COMMUNITY
Start: 2020-01-07 | End: 2023-01-24

## 2021-11-23 NOTE — PROGRESS NOTES
Service Date: 2021    Ksenia Lyles MD   [place] [phone]    RE:      Joel Pineda  MRN:  [id number]  :   1975    Dear Dr. Lyles:    I had the pleasure of meeting Joel Pineda today in my clinic.  He is a patient who had seen our Neurosurgery colleagues several times.  He did have some neck issue that he had met with Dr. Obando.  He also has been seen by Kellie Guerra, our nurse practitioner, for a lack of proprioception in his lower extremity and an EMG, which shows that he has bilateral peroneal nerve palsy as well as a history of diffuse polyneuropathy.  Mr. Pineda has an ongoing history of low back pain for over a decade. Approximately 13 years ago, he underwent a microdiskectomy by Dr. Mcdermott because of a herniated disk from leaning over. The surgery went well, and he was discharged the next day; however, he was told that there was quite a bit of scarring and adhesions to the nerve root. Approximately 6 months following his surgery, he started developing increasing pain in his low back again to the point where he had admitted himself to the [facility], and he stayed there in hospital.  He has remained on Lyrica for the remaining decade, but his symptoms have been getting worse. During this period, he has undergone multiple bouts of physical therapy.  This has not really helped him.  He has put on quite a bit of weight.  Currently, he is well over 300 pounds.    NEUROLOGIC EXAMINATION:  This is a very pleasant gentleman in no apparent distress.  He has a normal range of motion.  The incision on his back is well healed.  His strength is 5/5.  His gait and station are normal.  He has no difficulty with heel walk or toe walk.  Sensation to light touch is normal.  Reflexes are 2/4 at the knees and the ankles.    The MRI shows a surgical scar at the L5-S1 level.  He does have a moderate amount of foraminal narrowing,  worse on the left side.  He does have quite a bit of fatty deposit overlying his fascia. His EMG shows that he has polyneuropathy that has improved somewhat since 2019.  He does have severe peroneal neuropathy bilaterally.    Mr. Lee is a complicated patient.  He has a history of diabetes, for which he is on oral medications.  I informed him that this certainly can cause the polyneuropathy as well as neuropathy of any kind, including radiculopathy.  He had previously surgery that indicated he already had scar formation.  This also would decrease the likelihood of helping him in terms of decompression. His symptoms are also more generalized and appear to be involving both legs, and therefore if operated on the left side, we may not be able to give him the benefit that he would want bilaterally.  He does have his history of diabetes, which can complicate neuropathy  An epidural steroid injection is a possibility; however, that may be worth considering, and we should discuss this at our next visit.  He will need to have a repeat EMG, as that is one that was requested by Dr. Estevez, the neurologist who performed the EMG previously, the reason being that his new symptom of proprioceptive loss was too recent to be detectable by EMG.  I also encouraged Mr. Lee to lose some weight.  He said he will try to do so. This will help him both with his diabetes as well as his back. For that reason, we will order the EMG, and we will have Mr. Lee come back in 3 months' time.    Sincerely,    Nixon Gallagher MD        D: 2021   T: 2021   MT: [email or web address]    Name:     WANG LEE  MRN:      [id number]        Account:      [id number]   :      1975           Service Date: 2021       Document: [id number]

## 2021-11-23 NOTE — PATIENT INSTRUCTIONS
Thank you for choosing [facility]. You were seen in the Neurosurgery Clinic today with Dr. Gallagher.    Next steps:  1. Complete an EMG in 1-2 months.  2. Return to clinic with Dr. Gallagher in 3 months.    Please don't hesitate to reach out via MyChart or telephone if you have any questions after your visit.    Lenore Hatch RN Care Coordinator, Neurosurgery    Direct: [phone]  Neurosurgery Clinic: [phone]

## 2021-11-23 NOTE — LETTER
2021       RE: Joel Pineda  [address] [place] [address]     Dear Colleague,    Thank you for referring your patient, Joel Pineda, to the [facility] NEUROSURGERY CLINIC [place] at [facility]. Please see a copy of my visit note below.    Service Date: 2021    Ksenia Lyles MD   [address] [address]    RE:      Joel Pineda  MRN:  [id number]  :   1975    Dear Dr. Lyles:    I had the pleasure of meeting Joel Pineda today in my clinic.  He is a patient who had seen our Neurosurgery colleagues several times.  He did have some neck issue that he had met with Dr. Obando.  He also has been seen by Kellie Guerra, our nurse practitioner, for a lack of proprioception in his lower extremity and an EMG, which shows that he has bilateral peroneal nerve palsy as well as a history of diffuse polyneuropathy.  Mr. Pineda has an ongoing history of low back pain for over a decade. Approximately 13 years ago, he underwent a microdiskectomy by Dr. Mcdermott because of a herniated disk from leaning over. The surgery went well, and he was discharged the next day; however, he was told that there was quite a bit of scarring and adhesions to the nerve root. Approximately 6 months following his surgery, he started developing increasing pain in his low back again to the point where he had admitted himself to the [facility] health [facility], and he stayed there in hospital.  He has remained on Lyrica for the remaining decade, but his symptoms have been getting worse. During this period, he has undergone multiple bouts of physical therapy.  This has not really helped him.  He has put on quite a bit of weight.  Currently, he is well over 300 pounds.    NEUROLOGIC EXAMINATION:  This is a very pleasant gentleman in no apparent distress.  He has a normal range of motion.  The incision on his  back is well healed.  His strength is 5/5.  His gait and station are normal.  He has no difficulty with heel walk or toe walk.  Sensation to light touch is normal.  Reflexes are 2/4 at the knees and the ankles.    The MRI shows a surgical scar at the L5-S1 level.  He does have a moderate amount of foraminal narrowing, worse on the left side.  He does have quite a bit of fatty deposit overlying his fascia. His EMG shows that he has polyneuropathy that has improved somewhat since 2019.  He does have severe peroneal neuropathy bilaterally.    Mr. Lee is a complicated patient.  He has a history of diabetes, for which he is on oral medications.  I informed him that this certainly can cause the polyneuropathy as well as neuropathy of any kind, including radiculopathy.  He had previously surgery that indicated he already had scar formation.  This also would decrease the likelihood of helping him in terms of decompression. His symptoms are also more generalized and appear to be involving both legs, and therefore if operated on the left side, we may not be able to give him the benefit that he would want bilaterally.  He does have his history of diabetes, which can complicate neuropathy  An epidural steroid injection is a possibility; however, that may be worth considering, and we should discuss this at our next visit.  He will need to have a repeat EMG, as that is one that was requested by Dr. Estevez, the neurologist who performed the EMG previously, the reason being that his new symptom of proprioceptive loss was too recent to be detectable by EMG.  I also encouraged Mr. Lee to lose some weight.  He said he will try to do so. This will help him both with his diabetes as well as his back. For that reason, we will order the EMG, and we will have Mr. Lee come back in 3 months' time.    Sincerely,    Nixon Gallagher MD        D: 11/23/2021   T: 11/23/2021   MT: [email or web address]    Name:     WANG LEE  MRN:      [id number]         Account:      [id number]   :      1975           Service Date: 2021       Document: [id number]

## 2021-11-29 ENCOUNTER — MYC REFILL (OUTPATIENT)
Dept: FAMILY MEDICINE | Facility: CLINIC | Age: 46
End: 2021-11-29
Payer: MEDICARE

## 2021-11-29 DIAGNOSIS — M45.8 ANKYLOSING SPONDYLITIS OF SACRAL REGION (H): ICD-10-CM

## 2021-11-29 DIAGNOSIS — M51.369 DDD (DEGENERATIVE DISC DISEASE), LUMBAR: ICD-10-CM

## 2021-11-29 NOTE — PATIENT INSTRUCTIONS
At [facility], we strive to deliver an exceptional experience to you, every time we see you. If you receive a survey, please complete it as we do value your feedback.  If you have MyChart, you can expect to receive results automatically within 24 hours of their completion.  Your provider will send a note interpreting your results as well.   If you do not have MyChart, you should receive your results in about a week by mail.    Your care team:                            Family Medicine Internal Medicine   MD Houston Frances MD Shantel Branch-Fleming, MD Srinivasa Vaka, MD Katya Belousova, PAAIDAN Tatum, APRN CNP    Javier Cavazos, MD Pediatrics   Klever Obando, PAAIDAN Scherer, CNP MD Radha Victor APRN CNP   MD Humaira Devlin MD Deborah Mielke, MD Diana Kauffman, APRN [facility]      Clinic hours: Monday - Thursday 7 am-6 pm; Fridays 7 am-5 pm.   Urgent care: Monday - Friday 10 am- 8 pm; Saturday and Sunday 9 am-5 pm.    Clinic: [phone]       [facility]: Monday - Thursday 8 am - 7 pm; Friday 8 am - 6 pm  [facility]: [phone]     Use www.[email or web address] for 24/7 diagnosis and treatment of dozens of conditions.

## 2021-11-29 NOTE — PROGRESS NOTES
Assessment & Plan     (H92.01) Otalgia, right  (primary encounter diagnosis)  Comment: No evidence for otitis.  Perhaps he had some ETD which responded to the oral decongestant  Plan: No specific recommendations now.  Follow-up as needed    (L02.31) Abscess, gluteal, left  Comment: Resolving  Plan: Follow-up for reexamination if any new pain develops in the area        Return in about 3 months (around 2/19/2022) for diabetes, lab tests, recheck medications.    Bubba Anderson MD  [facility] is a 46 year old who presents for the following health issues     HPI         ENT/RESPIRATORY SYMPTOMS      Duration: onset 11/24/21    Description  ear pain right and ear drainage, muffled hearing, tender with tragus depression    Severity: mild    Accompanying signs and symptoms: PND    History (predisposing factors):  history of recurrent otitis externa    Precipitating or alleviating factors: None    Therapies tried and outcome:  oral decongestant, mild improvement     Review of Systems   Seen in ED 3 days ago for gluteal abscess       Objective    /81 (BP Location: Left arm, Patient Position: Sitting, Cuff Size: Adult Large)   Pulse 71   Temp 98  F (36.7  C) (Tympanic)   Wt 143.4 kg (316 lb 3.2 oz)   SpO2 95%   BMI 36.54 kg/m    Body mass index is 36.54 kg/m .  Physical Exam   GENERAL: healthy, alert and no distress  EYES: Eyes grossly normal to inspection, PERRL, EOMI, sclerae white and conjunctivae normal  ENT: Both TMs clear with normal landmarks and light reflex.  No cerumen.  Specifically the right ear canal shows no evidence of inflammation, perforation, or drainage.  No pain with tragus depression or manipulation of the pinna  MS: no gross musculoskeletal defects noted, no edema  SKIN: On the left buttock, near the gluteal crease, there is evidence for the small incision made 3 days ago for his abscess.  Is healing well, with no surrounding erythema,  bruising, or drainage.  The associated induration of the skin at the site is minimal  NEURO: Normal strength and tone, sensory exam grossly normal, mentation intact, oriented times 3 and cranial nerves 2-12 intact  PSYCH: mentation appears normal, affect normal/bright

## 2021-11-30 ENCOUNTER — OFFICE VISIT (OUTPATIENT)
Dept: FAMILY MEDICINE | Facility: CLINIC | Age: 46
End: 2021-11-30
Payer: MEDICARE

## 2021-11-30 VITALS
SYSTOLIC BLOOD PRESSURE: 127 MMHG | BODY MASS INDEX: 36.54 KG/M2 | OXYGEN SATURATION: 95 % | HEART RATE: 71 BPM | TEMPERATURE: 98 F | WEIGHT: 315 LBS | DIASTOLIC BLOOD PRESSURE: 81 MMHG

## 2021-11-30 DIAGNOSIS — L02.31 ABSCESS, GLUTEAL, LEFT: ICD-10-CM

## 2021-11-30 DIAGNOSIS — H92.01 OTALGIA, RIGHT: Primary | ICD-10-CM

## 2021-11-30 PROCEDURE — 99213 OFFICE O/P EST LOW 20 MIN: CPT | Performed by: FAMILY MEDICINE

## 2021-12-01 RX ORDER — OXYCODONE HYDROCHLORIDE 5 MG/1
5 TABLET ORAL EVERY 6 HOURS PRN
Qty: 35 TABLET | Refills: 0 | Status: SHIPPED | OUTPATIENT
Start: 2021-12-01 | End: 2021-12-22

## 2021-12-07 ENCOUNTER — VIRTUAL VISIT (OUTPATIENT)
Dept: FAMILY MEDICINE | Facility: CLINIC | Age: 46
End: 2021-12-07
Payer: MEDICARE

## 2021-12-07 DIAGNOSIS — M51.369 DDD (DEGENERATIVE DISC DISEASE), LUMBAR: ICD-10-CM

## 2021-12-07 DIAGNOSIS — B02.9 HERPES ZOSTER WITHOUT COMPLICATION: Primary | ICD-10-CM

## 2021-12-07 DIAGNOSIS — M45.8 ANKYLOSING SPONDYLITIS OF SACRAL REGION (H): ICD-10-CM

## 2021-12-07 PROCEDURE — 99214 OFFICE O/P EST MOD 30 MIN: CPT | Mod: 95 | Performed by: FAMILY MEDICINE

## 2021-12-07 RX ORDER — PREGABALIN 150 MG/1
150 CAPSULE ORAL 3 TIMES DAILY
Qty: 270 CAPSULE | Refills: 1 | Status: SHIPPED | OUTPATIENT
Start: 2021-12-07 | End: 2022-10-24

## 2021-12-07 RX ORDER — VALACYCLOVIR HYDROCHLORIDE 500 MG/1
500 TABLET, FILM COATED ORAL DAILY
Qty: 90 TABLET | Refills: 1 | Status: SHIPPED | OUTPATIENT
Start: 2021-12-07 | End: 2022-05-24

## 2021-12-07 NOTE — PROGRESS NOTES
Tito is a 46 year old who is being evaluated via a billable video visit.      How would you like to obtain your AVS? MyChart  If the video visit is dropped, the invitation should be resent by: Text to cell phone: 1  Will anyone else be joining your video visit? No      Video Start Time: 1716    Assessment & Plan     Herpes zoster without complication  Patient has had some issues with recurrent shingles outbreaks to posterior scalp.  Most recent was about a month ago and before that was a little over a year ago.  However there may be some association with his ongoing biologic therapy and relative immunosuppression.  Talk to Dr. Baxter about it who thought suppressive Valtrex might be something for he and I to talk about.  So I discussed with patient that most of her research tends to be around recurrent herpes simplex infections but there have been some cases of frequent or recurrent shingles.  So I think it would be reasonable to start a suppressive dose of Valtrex and see how things go.  - valACYclovir (VALTREX) 500 MG tablet; Take 1 tablet (500 mg) by mouth daily    Ankylosing spondylitis of sacral region (H)  We will continue with his biologic therapy with Dr. Baxter    DDD (degenerative disc disease), lumbar  Stable on current regimen.  Continue same plan and routine follow-up.   - pregabalin (LYRICA) 150 MG capsule; Take 1 capsule (150 mg) by mouth 3 times daily         See Patient Instructions    Return in about 3 months (around 3/7/2022) for Follow up on Pain, In Office or Video, can call for refills.    Ksenia Lyles MD  [facility]   Tito is a 46 year old who presents for the following health issues     HPI     Video visit patient today to talk about recurrent shingles.  Healing up from his last bout.    Review of Systems   Constitutional, HEENT, cardiovascular, pulmonary, gi and gu systems are negative, except as otherwise noted.      Objective            Vitals:  No vitals were obtained today due to virtual visit.    Physical Exam   GENERAL: Healthy, alert and no distress  EYES: Eyes grossly normal to inspection.  No discharge or erythema, or obvious scleral/conjunctival abnormalities.  RESP: No audible wheeze, cough, or visible cyanosis.  No visible retractions or increased work of breathing.    SKIN: Visible skin clear. No significant rash, abnormal pigmentation or lesions.  NEURO: Cranial nerves grossly intact.  Mentation and speech appropriate for age.  PSYCH: Mentation appears normal, affect normal/bright, judgement and insight intact, normal speech and appearance well-groomed.    Past labs reviewed with the patient.             Video-Visit Details    Type of service:  Video Visit    Video End Time:1726    Originating Location (pt. Location): Home    Distant Location (provider location):  [facility]     Platform used for Video Visit: FieldAware   Answers for HPI/ROS submitted by the patient on 11/28/2021  How many servings of fruits and vegetables do you eat daily?: 2-3  On average, how many sweetened beverages do you drink each day (Examples: soda, juice, sweet tea, etc.  Do NOT count diet or artificially sweetened beverages)?: 2  How many minutes a day do you exercise enough to make your heart beat faster?: 9 or less  How many days a week do you exercise enough to make your heart beat faster?: 3 or less  How many days per week do you miss taking your medication?: 0

## 2021-12-09 ENCOUNTER — PATIENT OUTREACH (OUTPATIENT)
Dept: SURGERY | Facility: CLINIC | Age: 46
End: 2021-12-09
Payer: MEDICARE

## 2021-12-09 NOTE — PROGRESS NOTES
I called Joel in regards to his MyChart message that he sent about a recurrent perianal abscess.  I told him that we do not have anybody in the clinic today however I do have a surgeon who is willing to come to clinic before a surgical case.  Patient will need to come into the clinic now.  He states that he is unavailable and cannot come in now.  He states he will go to the emergency room later.

## 2021-12-13 ENCOUNTER — TRANSFERRED RECORDS (OUTPATIENT)
Dept: HEALTH INFORMATION MANAGEMENT | Facility: CLINIC | Age: 46
End: 2021-12-13
Payer: MEDICARE

## 2021-12-13 ENCOUNTER — TELEPHONE (OUTPATIENT)
Dept: BEHAVIORAL HEALTH | Facility: CLINIC | Age: 46
End: 2021-12-13
Payer: MEDICARE

## 2021-12-13 DIAGNOSIS — F60.3 BORDERLINE PERSONALITY DISORDER (H): ICD-10-CM

## 2021-12-13 NOTE — TELEPHONE ENCOUNTER
Pt did call writer back and confirmed interest in starting in CLinic next week  On 12/22.  Will get this set up in medical records.

## 2021-12-13 NOTE — TELEPHONE ENCOUNTER
Writer called Pt today and left a vm message requesting a return phone call to discuss the once per week group therapy option to see if interested and able to start next week.

## 2021-12-14 ENCOUNTER — MYC MEDICAL ADVICE (OUTPATIENT)
Dept: PODIATRY | Facility: CLINIC | Age: 46
End: 2021-12-14

## 2021-12-14 DIAGNOSIS — M72.2 PLANTAR FASCIITIS: Primary | ICD-10-CM

## 2021-12-21 ASSESSMENT — ANXIETY QUESTIONNAIRES
3. WORRYING TOO MUCH ABOUT DIFFERENT THINGS: MORE THAN HALF THE DAYS
6. BECOMING EASILY ANNOYED OR IRRITABLE: SEVERAL DAYS
4. TROUBLE RELAXING: SEVERAL DAYS
GAD7 TOTAL SCORE: 8
GAD7 TOTAL SCORE: 8
2. NOT BEING ABLE TO STOP OR CONTROL WORRYING: MORE THAN HALF THE DAYS
GAD7 TOTAL SCORE: 8
7. FEELING AFRAID AS IF SOMETHING AWFUL MIGHT HAPPEN: SEVERAL DAYS
5. BEING SO RESTLESS THAT IT IS HARD TO SIT STILL: NOT AT ALL
1. FEELING NERVOUS, ANXIOUS, OR ON EDGE: SEVERAL DAYS
7. FEELING AFRAID AS IF SOMETHING AWFUL MIGHT HAPPEN: SEVERAL DAYS

## 2021-12-22 ENCOUNTER — MYC REFILL (OUTPATIENT)
Dept: FAMILY MEDICINE | Facility: CLINIC | Age: 46
End: 2021-12-22
Payer: MEDICARE

## 2021-12-22 ENCOUNTER — HOSPITAL ENCOUNTER (OUTPATIENT)
Dept: BEHAVIORAL HEALTH | Facility: CLINIC | Age: 46
End: 2021-12-22
Attending: PSYCHIATRY & NEUROLOGY
Payer: MEDICARE

## 2021-12-22 DIAGNOSIS — M51.369 DDD (DEGENERATIVE DISC DISEASE), LUMBAR: ICD-10-CM

## 2021-12-22 DIAGNOSIS — M45.8 ANKYLOSING SPONDYLITIS OF SACRAL REGION (H): ICD-10-CM

## 2021-12-22 PROBLEM — F33.2 MDD (MAJOR DEPRESSIVE DISORDER), RECURRENT SEVERE, WITHOUT PSYCHOSIS (H): Status: ACTIVE | Noted: 2021-12-22

## 2021-12-22 PROCEDURE — 90853 GROUP PSYCHOTHERAPY: CPT | Mod: PO | Performed by: SOCIAL WORKER

## 2021-12-22 RX ORDER — OXYCODONE HYDROCHLORIDE 5 MG/1
5 TABLET ORAL EVERY 6 HOURS PRN
Qty: 35 TABLET | Refills: 0 | Status: SHIPPED | OUTPATIENT
Start: 2021-12-22 | End: 2022-01-09

## 2021-12-22 ASSESSMENT — ANXIETY QUESTIONNAIRES: GAD7 TOTAL SCORE: 8

## 2021-12-22 NOTE — TELEPHONE ENCOUNTER
Routing refill request to provider for review/approval because:  Drug not on the [facility] refill protocol     Dianne Henderson RN  [facility]

## 2021-12-22 NOTE — GROUP NOTE
Process Group Note    PATIENT'S NAME: Joel Pineda  MRN:   [id number]  :   1975  ACCT. NUMBER: [id number]  DATE OF SERVICE: 21  START TIME:  1:00 PM  END TIME:  1:50 PM  FACILITATOR: Magali Dodson [facility]  TOPIC:  Process Group    Diagnoses:  296.33 (F33.2) Major Depressive Disorder, Recurrent Episode, Severe _.  4. Other Diagnoses that is relevant to services:   300.00 (F41.9) Unspecified Anxiety Disorder  301.83 (F60.3) Borderline Personality Disorder.      [facility] Day Treatment  TRACK: [facility] One    NUMBER OF PARTICIPANTS: 5                                      Service Modality:  Video Visit     Telemedicine Visit: The patient's condition can be safely assessed and treated via synchronous audio and visual telemedicine encounter.      Reason for Telemedicine Visit: Services only offered telehealth    Originating Site (Patient Location): Patient's home    Distant Site (Provider Location): Provider Remote Setting- Home Office    Consent:  The patient/guardian has verbally consented to: the potential risks and benefits of telemedicine (video visit) versus in person care; bill my insurance or make self-payment for services provided; and responsibility for payment of non-covered services.     Patient would like the video invitation sent by:  My Chart    Mode of Communication:  Video Conference via Medical Zoom    As the provider I attest to compliance with applicable laws and regulations related to telemedicine.         Client participated in educational discussion on skills to manage life transitions.      Data:    Session content: At the start of this group, patients were invited to check in by identifying themselves, describing their current emotional status, and identifying issues to address in this group.   Area(s) of treatment focus addressed in this session included Symptom Management, Personal Safety and Community Resources/Discharge Planning.  Tito was welcomed  and oriented to the group.  He reported needing to adjust his expectations of himself as he is having difficulty following through on goals.   He shared example of purchasing some gifts in early December but not having followed through on packaging them and taking them to the post office.   Peers shared ideas to break tasks down into manageable pieces.  Client denied suicidal ideation, intent and plan.     Therapeutic Interventions/Treatment Strategies:  Psychotherapist offered support, feedback and validation and reinforced use of skills. Treatment modalities used include Cognitive Behavioral Therapy. Interventions include Cognitive Restructuring:  Assisted patient in formulating new neutral/positive alternatives to challenge less helpful / ineffective thoughts.    Assessment:    Patient response:   Patient responded to session by focusing on goals, being attentive and accepting support    Possible barriers to participation / learning include: and no barriers identified    Health Issues:   None reported       Substance Use Review:   Substance Use: No active concerns identified.    Mental Status/Behavioral Observations  Appearance:   Appropriate   Eye Contact:   Good   Psychomotor Behavior: Normal   Attitude:   Cooperative   Orientation:   All  Speech   Rate / Production: Normal    Volume:  Normal   Mood:    Anxious  Depressed   Affect:    Appropriate   Thought Content:   Clear  Thought Form:  Coherent  Logical     Insight:    Good     Plan:     Safety Plan: No current safety concerns identified.  Recommended that patient call 911 or go to the local ED should there be a change in any of these risk factors.     Barriers to treatment: None identified    Patient Contracts (see media tab):  None    Substance Use: Not addressed in session     Continue or Discharge: Patient will continue in Adult Day Treatment (ADT)  as planned. Patient is likely to benefit from learning and using skills as they work toward the goals  identified in their treatment plan.      Magali Dodson, [facility]  December 22, 2021

## 2021-12-23 ENCOUNTER — TELEPHONE (OUTPATIENT)
Dept: FAMILY MEDICINE | Facility: CLINIC | Age: 46
End: 2021-12-23
Payer: MEDICARE

## 2021-12-28 ENCOUNTER — TELEPHONE (OUTPATIENT)
Dept: BEHAVIORAL HEALTH | Facility: CLINIC | Age: 46
End: 2021-12-28
Payer: MEDICARE

## 2021-12-28 NOTE — DISCHARGE SUMMARY
Adult Mental Health Intensive Outpatient Discharge Summary/Instructions      Patient: Joel Pineda MRN: [id number]   : 1975 Age: 46 year old Sex: male     Admission Date: 21  Discharge Date: 3/9/22  Diagnosis:   296.33 (F33.2) Major Depressive Disorder, Recurrent Episode, Severe _.   300.00 (F41.9) Unspecified Anxiety Disorder  301.83 (F60.3) Borderline Personality Disorder.      Focus of Treatment / Progress    Personal Safety: Tito has a coping plan in place.   His passive suicidal ideation is at a stable level.     * Follow your safety plan     * Call crisis lines as needed:    [facility] [phone] [facility] [phone]  [facility] [phone] Crisis Connection [phone]  [facility] [phone] [facility] [phone]  [facility] [phone] National Suicide Prevention [phone]  [facility] [phone] Suicide Prevention [phone]  [facility] [phone]    Managing symptoms of:  Tito continued to work on depressive symptoms, anxiety symptoms, chronic pain management, and interpersonal concerns.    Community support/health:  Tito worked on skills to maintain health care activities.  He also worked on keeping preferred routines during the week.      Managing Symptoms and Preventing Relapse    * Go to all of your appointments    * Take all medications as directed.      * Carry a current list if medication with you    * Do not use illicit (street) drugs.  Avoid alcohol    * Report these symptoms to your care team. These are early signs of relapse:   Thoughts of suicide   Losing more sleep   Increased confusion   Mood getting worse   Feeling more aggressive   Other:  Psychosis symptoms, acute SI, ideas of driving into road barriers to cause an accident    *Use these skills daily:  Client shared discharge plan with the group.  Client was receptive to feedback from peers on their strengths and progress in the program.    Copy of summary  sent to: EPIC    Follow up with psychiatrist / main caregiver: [facility]. Dr. De Dios        Next visit: 3/15    Follow up with your therapist: Magali Nelson at  [facility]  Next visit: 3/10/22    Go to group therapy and / or support groups at: Consider support group for H.S.skin condition.    See your medical doctor about:  General care needs.  Tito works with a primary care provider, a rheumatologist, and a sleep neurologist.    Other:  Tito lives by himself with two cats. Tito plans to walk more as the spring weather arrives. Tito is considering starting a Silver Sneakers class.  He is also considering swimming at a local [employer] club.  He may consider an art class, perhaps through a local gallery, [facility] [employer].     Your treatment team appreciates having the opportunity to work with you and wishes you the best.    Client Signature: Unable to sign due to COVID 19 pandemic  Date / Time:3/9/22  1500  Staff Signature:FIORELLA Ybarra, [facility]  Date / Time: 3/9/22 1500

## 2021-12-28 NOTE — PROGRESS NOTES
"Adult Day Treatment Program:  Individualized Treatment Plan       Date of Plan: 21    Name: Joel Pineda MRN: [id number]    : 1975     Program: Adult Day Treatment Program (ADT)    Clinical Track: [facility]    DSM5 Diagnosis:  296.33 (F33.2) Major Depressive Disorder, Recurrent Episode, Severe.  300.00 (F41.9) Unspecified Anxiety Disorder  301.83 (F60.3) Borderline Personality Disorder.    ADT Multidisciplinary Team Members:  Dr. Joel Tan MD, FIORELLA Ybarra, [facility]  Joel Pineda will participate in the Adult Day Treatment Program 3 days per week, 3 hours per day.   Anticipated duration/discharge: 12 weeks    Due to COVID-19, services will be delivered via telemedicine until further notice.     Program Start Date: 21  Anticipated Discharge Date: 3/9/21 (pending authorization/clinical changes)    NOTE: Complete CGI     Review Date: Does Joel Pineda continue to meet criteria to participate in the ADT Program, 3 days per week; 3 hours per day?   21 yes   3/9/21 yes client discharged                 Client Strengths:  caring, empathetic, goal-focused, good listener, insightful, intelligent, motivated and open to learning    Client Participation in Plan:  Contributed to goals and plan     Areas of Vulnerability:  Anxiety  Depressive symptoms   Physical/medical: chronic pain and neurological issues     Long-Term Goals:  Knowledge about illness and management of symptoms   Maintenance of personal safety     Abuse Prevention Plan:  Safe, therapeutic environment   Safety coping plan as needed     Discharge Criteria:  Satisfactory progress toward treatment goals   Ability to continue recovery at next level of service      Areas of Treatment Focus     Why are you seeking treatment/What do you want to focus on during treatment? \"I would like to have an easier time focusing and resolving issues.  The fatigue and mental fogginess are bothersome.\"       Area of Treatment Focus:   " Personal Safety  Start Date:    12/22/21    Goal:  Target Date: 2/9/22, 3/9/22 Status: Completed  Client will notify staff when needing assistance to develop or implement a coping plan to manage suicidal or self injurious urges.  Tito states that passive suicidal thoughts are less frequent and less intense.      Progress:   2/9/21:  Tito reported stable level of low intensity passive suicidal ideation.   He denied active suicidal ideation.  3/9/21:  Tito denied daily passive suicidal ideation at discharge.  He has a coping plan in place for intermittent suicidal ideation.          Treatment Strategies:   Teach adaptive coping skills and communication skills      Area of Treatment Focus:   Symptom Stabilization and Management  Start Date:    12/22/21    Goal:  Target Date: 2/9/21, 3/9/22 Status: Tammie Francis would like to reduce social anxiety.  He would like to work on focusing and following through on issues.  He will explore coping skills for managing fatigue and mental fogginess.        Progress:   2/9/22:  Tito is practicing skills to tolerate social anxiety.  He is working on skills to mange fatigue and mental fogginess as he continues to work with his provider on medications.   3/9/22:  Tito worked on skills to tolerate social anxiety.  He continues to work with his providers on mental fogginess.   He is practicing compensation skills.     Treatment Strategies:   Teach adaptive coping skills and communication skills        Area of Treatment Focus:   Community Resources / Support and Discharge Planning  Start Date:    12/22/21    Goal:  Target Date: 2/9/21, 3/9/22 Status: Tammie Francis is seeing a psychiatrist at [facility].  He has requested a new [facility] therapist.  He would like ACT or DBT skills individually.  Tito has a sleep provider, a rheumatologist, and a primary care provider.  Tito is considering starting a Silver Sneakers class.  He is also considering swimming  "at a local health club.  He may consider an art class, perhaps through a local gallery, [facility] [employer].      Progress:   2/9/22:  Tito has scheduled an individual DBT therapist at [facility] for 3/10/22.  He continues to see his medical providers.  He continues to consider other activities but has not chosen one to try yet.    3/9/22:  Tito will start working with Magali Nelson at [facility] on 3/10/22.  He is looking at resources for social activities after discharge.        Treatment Strategies:   Teach adaptive coping skills and communication skills     Magali Dodson, [facility]      NOTE: Required signatures are completed manually and scanned into the electronic medical record. See \"Media\" tab in epic.    The Individualized Treatment Plan Signature Page has been routed to the provider for co-sign.        "

## 2021-12-28 NOTE — ADDENDUM NOTE
Encounter addended by: Magali Dodson [facility] on: 12/28/2021 11:40 AM   Actions taken: Clinical Note Signed

## 2021-12-28 NOTE — PROGRESS NOTES
Acknowledgement of Current Treatment Plan       I have reviewed my treatment plan with my therapist / counselor on 12/22/21.   I agree with the plan as it is written in the electronic health record. ([facility])    Name:      Signature:  Joel Pineda Unable to sign due to COVID 19 pandemic     Joel Tan MD  Psychiatrist/Medical Director Joel Tan MD on 1/3/2022 at 8:06 AM    SCOTT Germain  Psychotherapist FIORELLA Butterfield, [facility]

## 2021-12-29 ENCOUNTER — HOSPITAL ENCOUNTER (OUTPATIENT)
Dept: BEHAVIORAL HEALTH | Facility: CLINIC | Age: 46
End: 2021-12-29
Attending: PSYCHIATRY & NEUROLOGY
Payer: MEDICARE

## 2021-12-29 DIAGNOSIS — F60.3 BORDERLINE PERSONALITY DISORDER (H): ICD-10-CM

## 2021-12-29 PROCEDURE — 90853 GROUP PSYCHOTHERAPY: CPT | Mod: PO | Performed by: SOCIAL WORKER

## 2021-12-29 NOTE — GROUP NOTE
Process Group Note    PATIENT'S NAME: Joel Pineda  MRN:   [id number]  :   1975  ACCT. NUMBER: [id number]  DATE OF SERVICE: 21  START TIME:  1:00 PM  END TIME:  1:50 PM  FACILITATOR: Magali Dodson [facility]  TOPIC: MH Process Group    Diagnoses:  296.33 (F33.2) Major Depressive Disorder, Recurrent Episode, Severe _.   300.00 (F41.9) Unspecified Anxiety Disorder  301.83 (F60.3) Borderline Personality Disorder.      [facility] Day [facility]  TRACK: [facility]    NUMBER OF PARTICIPANTS: 6                                    Service Modality:  Video Visit     Telemedicine Visit: The patient's condition can be safely assessed and treated via synchronous audio and visual telemedicine encounter.      Reason for Telemedicine Visit: Services only offered telehealth    Originating Site (Patient Location): Patient's home    Distant Site (Provider Location): Provider Remote Setting- Home Office    Consent:  The patient/guardian has verbally consented to: the potential risks and benefits of telemedicine (video visit) versus in person care; bill my insurance or make self-payment for services provided; and responsibility for payment of non-covered services.     Patient would like the video invitation sent by:  My Chart    Mode of Communication:  Video Conference via Medical Zoom    As the provider I attest to compliance with applicable laws and regulations related to telemedicine.         Client participated in a group discussion and educational information about skills to build and maintain trust.        Data:    Session content: At the start of this group, patients were invited to check in by identifying themselves, describing their current emotional status, and identifying issues to address in this group.   Area(s) of treatment focus addressed in this session included Symptom Management, Personal Safety and Community Resources/Discharge Planning.  Tito reported getting a flyer from his  "psychiatrist's office informing people that therapy openings are available.   He stated that he requested to be connected with a therapist and hopes to hear back within the one week outlined in the process.   He stated he requested a therapist who offers ACT or DBT.  He is not interested in a DBT skills group at this time.  He stated that he is sleeping 10-12 hours per day over the past two weeks.  He stated that he decreased his pain medication about one week ago and this has not helped.  He stated that he has an early morning appointment tomorrow and hopes he will wake up in time.  Mood was depressed and anxious.   Client reported some hopelessness.  Energy and motivation were low.  Client denied suicidal ideation, intent and plan.     Therapeutic Interventions/Treatment Strategies:  Psychotherapist offered support, feedback and validation and reinforced use of skills. Treatment modalities used include Cognitive Behavioral Therapy. Interventions include Cognitive Restructuring:  Facilitated recognition of the connection between negative thoughts and negative core beliefs and Coping Skills: Assisted patient in identifying 1-2 healthy distraction skills to reduce overall distress and Discussed how the use of intentional \"in the moment\" actions can help reduce distress.    Assessment:    Patient response:   Patient responded to session by focusing on goals and being attentive    Possible barriers to participation / learning include: and no barriers identified    Health Issues:   None reported       Substance Use Review:   Substance Use: No active concerns identified.    Mental Status/Behavioral Observations  Appearance:   Appropriate   Eye Contact:   Good   Psychomotor Behavior: Normal   Attitude:   Cooperative   Orientation:   All  Speech   Rate / Production: Normal    Volume:  Normal   Mood:    Anxious  Depressed   Affect:    Appropriate   Thought Content:   Clear  Thought Form:  Coherent  Logical   "   Insight:    Good     Plan:     Safety Plan: No current safety concerns identified.  Recommended that patient call 911 or go to the local ED should there be a change in any of these risk factors.     Barriers to treatment: None identified    Patient Contracts (see media tab):  None    Substance Use: Not addressed in session     Continue or Discharge: Patient will continue in Adult Day Treatment (ADT)  as planned. Patient is likely to benefit from learning and using skills as they work toward the goals identified in their treatment plan.      Magali Dodson, [facility]  December 29, 2021

## 2021-12-30 ENCOUNTER — OFFICE VISIT (OUTPATIENT)
Dept: SURGERY | Facility: CLINIC | Age: 46
End: 2021-12-30
Payer: MEDICARE

## 2021-12-30 VITALS
TEMPERATURE: 98 F | WEIGHT: 315 LBS | OXYGEN SATURATION: 97 % | HEART RATE: 66 BPM | DIASTOLIC BLOOD PRESSURE: 89 MMHG | BODY MASS INDEX: 36.45 KG/M2 | SYSTOLIC BLOOD PRESSURE: 137 MMHG | HEIGHT: 78 IN | RESPIRATION RATE: 16 BRPM

## 2021-12-30 DIAGNOSIS — L02.31 ABSCESS OF MULTIPLE SITES OF BUTTOCK: ICD-10-CM

## 2021-12-30 DIAGNOSIS — K64.8 INTERNAL HEMORRHOIDS: Primary | ICD-10-CM

## 2021-12-30 DIAGNOSIS — K62.5 RECTAL BLEEDING: ICD-10-CM

## 2021-12-30 PROCEDURE — 46221 LIGATION OF HEMORRHOID(S): CPT | Performed by: NURSE PRACTITIONER

## 2021-12-30 PROCEDURE — 99214 OFFICE O/P EST MOD 30 MIN: CPT | Mod: 25 | Performed by: NURSE PRACTITIONER

## 2021-12-30 ASSESSMENT — PAIN SCALES - GENERAL: PAINLEVEL: MILD PAIN (3)

## 2021-12-30 ASSESSMENT — MIFFLIN-ST. JEOR: SCORE: 2439.79

## 2021-12-30 NOTE — LETTER
"2021       RE: Joel Pineda  [address] [place] [address]     Dear Colleague,    Thank you for referring your patient, Joel Pineda, to the [facility] [place] at [facility]. Please see a copy of my visit note below.    Colon and Rectal Surgery Follow-Up Clinic Note    RE: Joel Pineda  : 1975  MARY: 2021    Joel Pineda is a very pleasant 46 year old male here for follow-up of hemorrhoids.    HPI:  Joel has a history of diverticular disease status post sigmoidectomy with Dr. Justice in .  He has been seen for recurrent buttocks abscess, anal fissure, and proctalgia fugax with me and Dr. Jimenez.  He reportedly has had difficulty with bleeding hemorrhoids in the past and has had hemorrhoid banding at [facility] a few years ago.  He has developed a few teaspoons of blood daily to every other day again.  This is painless.  No prolapsing tissue.  Bowel movements are very soft.  He is not on any blood thinners.  He does report that he has gotten 3 additional abscesses since he was last seen here on his left buttock and his perineum.   Last colonoscopy was 9/15/20. Cecal and ascending colon polyps were found, path consistent with TA. EGD was normal.    Physical Examination: Exam was chaperoned by Daisy Calabrese MA   /89 (BP Location: Left arm, Patient Position: Sitting, Cuff Size: Adult Large)   Pulse 66   Temp 98  F (36.7  C) (Oral)   Resp 16   Ht 6' 6\"   Wt 316 lb 11.2 oz   SpO2 97%   BMI 36.60 kg/m    General: Alert, oriented, in no acute distress, sitting comfortably  HEENT: mucous membranes moist  Perianal external examination:  Perianal skin: Intact with no excoriation or lichenification.  Lesions: Scars present on left buttock, left posterior thigh, and left perianal position.  Eversion of buttocks: There was not evidence of an anal fissure. " Details: N/A.  Skin tags or external hemorrhoids: None.    Digital rectal examination: Was performed.   Sphincter tone: Good.  Palpable lesions: No.  Prostate: Normal.  Other: None..    Anoscopy: Was performed.   Hemorrhoids: Yes. Grade 2-3 internal hemorrhoids without active bleeding  Lesions: No.    Procedure: After discussing the risks and benefits, the patient agreed to proceed with internal hemorrhoidal banding.    Prior to the start of the procedure and with procedural staff participation, I verbally confirmed the patient s identity using two indicators, relevant allergies, that the procedure was appropriate and matched the consent or emergent situation, and that the correct equipment/implants were available. Immediately prior to starting the procedure I conducted the Time Out with the procedural staff and re-confirmed the patient s name, procedure, and site/side. (The Joint Commission universal protocol was followed.)  Yes    Sedation (Moderate or Deep): None    A suction hemorrhoidal  was used to place a total of 2 band(s) in the anterior and posterior position(s).    There was no significant bleeding. The patient tolerated the procedure well.    This procedure was performed under a collaborative privileging agreement with Dr. Arce, Chief of Colon and Rectal Surgery.    Assessment/Plan: 46 year old male with internal hemorrhoids and rectal bleeding. Discussed managing with banding again as he tolerated this well in the past. Two bands were placed today, which she tolerated well.  Asked him to remain off of blood thinners and avoid any heavy lifting for 2 weeks.  He has had recurrent buttocks abscesses but in different locations.  I would like him to see dermatology for this.  If they do not feel that it is a dermatologic issue, could consider pelvic MRI to rule out a fistula, although I think that this is quite unlikely given the locations of the multiple abscess sites.  We will have him return to  clinic anytime after 1 month for any recurrent bleeding. Patient's questions were answered to his stated satisfaction and he is in agreement with this plan.      Medical history:  Past Medical History:   Diagnosis Date     Acne      Acquired hypothyroidism      Allergic state      Ankylosing spondylitis lumbar region (H)      Ankylosing spondylitis of sacral region (H)      Anxiety      Bipolar 2 disorder (H)      Chest pain     Chest pain, regulated w/BP meds. Clear arteries.     Chronic pain      DDD (degenerative disc disease), lumbar      Depressive disorder      Diabetes (H)      Diverticulosis      Facet arthritis of cervical region      Gastroesophageal reflux disease      Hypertension      IBS (irritable bowel syndrome)      Intracranial arachnoid cyst      Major depressive disorder, recurrent episode (H)     Multiple psych providers - they manage meds August 2015: Provigil induced severe mood dis-function      Major depressive disorder, recurrent episode, severe (H) 12/2/2020     MDD (major depressive disorder), recurrent severe, without psychosis (H) 7/21/2021     LLOYD (obstructive sleep apnea)- mild (AHI 11)      Polyneuropathy      Pulmonary embolism (H)      Skin exam, screening for cancer 12/3/2013     Sleep apnea      Uncomplicated asthma        Surgical history:  Past Surgical History:   Procedure Laterality Date     BACK SURGERY  10/07    lumbar discectomy L5-S1     COLONOSCOPY      Note: colonoscopy scheduled with [facility] on Friday, 9/4/15     COSMETIC SURGERY  2012    Nose Exterior - functional     GI SURGERY  August 2013    Sigmoidectomy     HERNIA REPAIR, UMBILICAL  8/23/11    Dr. Evan whiting     INCISION AND DRAINAGE, ABSCESS, COMPLEX  8/23/11    umbilical, Dr. Evan Beavers     LAPAROSCOPIC ASSISTED COLECTOMY LEFT (DESCENDING)  8/15/2013    Procedure: LAPAROSCOPIC ASSISTED COLECTOMY LEFT (DESCENDING);  Laparoscopic Hand Assisted Sigmoid Resection, Mobilization of Splenic Fissure,  coloproctoscopy, *Latex Free Room* Anesthesia General with Pain block  ;  Surgeon: Aurora Justice MD;  Location: [facility] OR     NERVE SURGERY  8/18/11    RF ablation @ L3-S1 @ [facility]     RECONSTRUCT NOSE AND SEPTUM (FUNCTIONAL)  10/14/2011    Procedure:RECONSTRUCT NOSE AND SEPTUM (FUNCTIONAL); Functional Septorhinoplasty, Turbinate Reduction, ; Surgeon:CEDRIC CUEVAS; Location:[facility] OR     SINUS SURGERY  10/1/01    ethmoidectomy chronic sinusitis       Problem list:  Patient Active Problem List    Diagnosis Date Noted     MDD (major depressive disorder), recurrent severe, without psychosis (H) 12/22/2021     Priority: Medium     Borderline personality disorder (H) 11/17/2021     Priority: Medium     Chronic pain syndrome 07/12/2021     Priority: Medium     Dysautonomia (H) 08/18/2020     Priority: Medium     PHN (postherpetic neuralgia) 07/15/2020     Priority: Medium     POTS (postural orthostatic tachycardia syndrome) 03/24/2020     Priority: Medium     Orthostatic dizziness 03/18/2020     Priority: Medium     Gastroparesis 01/24/2020     Priority: Medium     Hypertriglyceridemia 12/16/2019     Priority: Medium     Ingrown toenail 07/03/2019     Priority: Medium     History of pulmonary embolism 01/28/2019     Priority: Medium     Peripheral polyneuropathy 01/23/2019     Priority: Medium     Type 2 diabetes mellitus with complication, without long-term current use of insulin (H) 12/24/2018     Priority: Medium     DDD (degenerative disc disease), lumbar 11/13/2018     Priority: Medium     Morbid obesity due to hypertriglyceridemia (H) 05/17/2018     Priority: Medium     Fatty infiltration of liver 05/17/2018     Priority: Medium     Ankylosing spondylitis of sacral region (H) 02/28/2018     Priority: Medium     Chronic, continuous use of opioids 04/04/2017     Priority: Medium     Patient is followed by Ksenia Lyles MD for ongoing prescription of pain medication.  All refills should only be approved by this  provider, or covering partner.    Medication(s): oxy 5.   Maximum quantity per month: 40  Clinic visit frequency required: Q3  months     Controlled substance agreement: 6/23/2020  UDS: Aug 2019    Encounter-Level CSA - 04/04/2017:          Controlled Substance Agreement - Scan on 4/11/2017  1:30 PM : CONTROLLED SUBSTANCE AGREEMENT (below)              Pain Clinic evaluation in the past: Yes    DIRE Total Score(s):  No flowsheet data found.    Last [facility] website verification:  12/2/2020   [email or web address]        Essential hypertension with goal blood pressure less than 140/90 11/01/2016     Priority: Medium     B12 deficiency 08/29/2016     Priority: Medium     Irritable bowel syndrome with diarrhea 08/19/2016     Priority: Medium     Chronic midline low back pain without sciatica 06/06/2016     Priority: Medium     Bipolar 2 disorder (H) 12/28/2015     Priority: Medium     Acquired hypothyroidism 10/08/2015     Priority: Medium     Facet arthritis of cervical region 10/06/2015     Priority: Medium     Intracranial arachnoid cyst 10/02/2015     Priority: Medium     LLOYD (obstructive sleep apnea)- mild (AHI 11) 01/31/2015     Priority: Medium     Study Date: 1/27/2015- (308.1 lbs)  Snoring was reported assoft to moderate.  Lowest oxygen saturation was 88.0%. Apnea/Hypopnea Index was 11.5 events per hour. REM was not seen.  The supine AHI is 12.7.  RDI was  25.7. PLM index was 0 per hour.  Sleep study 10/31/16 [facility] (292#) CPAP 8 cm effective       Generalized anxiety disorder 01/12/2015     Priority: Medium     GERD (gastroesophageal reflux disease)      Priority: Medium     Chronic nonallergic rhinitis      Priority: Medium     RAST all NEGATIVE for environmental allergens, IgE= 6 (normal)       Hyperlipidemia LDL goal <100      Priority: Medium     Intermittent asthma      Priority: Medium     Exercise-induced       Diverticulosis 09/01/2006     Priority: Medium     Recurrent diverticulitis, S/p  sigmoid resction 8/2013         Medications:  Current Outpatient Medications   Medication Sig Dispense Refill     acetaminophen 500 MG CAPS Take 1,000 mg by mouth 2 times daily  60 capsule      albuterol (PROAIR HFA/PROVENTIL HFA/VENTOLIN HFA) 108 (90 Base) MCG/ACT inhaler Inhale 2 puffs into the lungs every 4 hours as needed for shortness of breath / dyspnea or wheezing 8.5 g 11     aspirin (ASA) 81 MG tablet Take 81 mg by mouth daily        cetirizine (ZYRTEC) 10 MG tablet Take 1 tablet (10 mg) by mouth At Bedtime 30 tablet 11     cholecalciferol (D3-50) 1250 mcg (13045 units) capsule TAKE 1 CAPSULE BY MOUTH EVERY 2 WEEKS 24 capsule 0     clonazePAM (KLONOPIN) 0.5 MG tablet Take 0.5 mg by mouth 2 times daily as needed        cyanocobalamin (CYANOCOBALAMIN) 1000 MCG/ML injection INJECT 1 ML INTO THE MUSCLE EVERY 30 DAYS 10 mL 0     dronabinol (MARINOL) 5 MG capsule Take 1 capsule by mouth 2 times daily       DULoxetine (CYMBALTA) 60 MG capsule Take 120 mg by mouth daily        EPINEPHrine (ANY BX GENERIC EQUIV) 0.3 MG/0.3ML injection 2-pack Inject 0.3 mLs (0.3 mg) into the muscle once as needed for anaphylaxis 0.6 mL 3     ergocalciferol (ERGOCALCIFEROL) 1.25 MG (81391 UT) capsule take 1 capsule by ORAL route every 2 weeks       eszopiclone (LUNESTA) 3 MG tablet Take 3 mg by mouth At Bedtime        etanercept (ENBREL SURECLICK) 50 MG/ML autoinjector Inject 50 mg Subcutaneous once a week . Hold for signs of infection, and seek medical attention. 4 mL 7     famotidine (PEPCID) 20 MG tablet Prior to administration of Humira every 2 weeks (Patient taking differently: Take 20 mg by mouth every 7 days Prior to Enbrel Injections to prevent skin reaction)       fenofibrate (TRICOR) 48 MG tablet Take 1 tablet (48 mg) by mouth daily 90 tablet 1     fluticasone (FLONASE) 50 MCG/ACT nasal spray Spray 1 spray into both nostrils daily       fluticasone-salmeterol (ADVAIR) 250-50 MCG/DOSE inhaler Inhale 1 puff into the lungs  every 12 hours 14 each 11     LATUDA 40 MG TABS tablet Take 60 mg by mouth daily        levothyroxine (SYNTHROID/LEVOTHROID) 75 MCG tablet TAKE 1 TABLET EVERY MORNING 90 tablet 3     lidocaine (XYLOCAINE) 5 % external ointment APPLY TOPICALLY 4 TIMES DAILY AS NEEDED FOR PAIN  50 g 2     melatonin 3 MG tablet Take 13 mg by mouth nightly as needed        methocarbamol (ROBAXIN) 500 MG tablet Take 1-2 tablets (500-1,000 mg) by mouth 4 times daily as needed for muscle spasms 240 tablet 1     metoclopramide (REGLAN) 5 MG tablet Take 5 mg by mouth 2 times daily       metoprolol succinate ER (TOPROL-XL) 200 MG 24 hr tablet TAKE ONE TABLET BY MOUTH TWICE DAILY  180 tablet 1     montelukast (SINGULAIR) 10 MG tablet Take 1 tablet (10 mg) by mouth every evening 90 tablet 3     nabumetone (RELAFEN) 500 MG tablet TAKE ONE OR TWO TABLETS BY MOUTH TWICE DAILY AS NEEDED FOR MODERATE PAIN  WITH FOOD 120 tablet 0     naloxone (NARCAN) 4 MG/0.1ML nasal spray Spray 1 spray (4 mg) into one nostril alternating nostrils as needed for opioid reversal every 2-3 minutes until assistance arrives 0.2 mL 0     olopatadine (PATADAY) 0.2 % ophthalmic solution Place 1 drop into both eyes daily 2.5 mL 3     omega-3 acid ethyl esters (LOVAZA) 1 g capsule TAKE TWO CAPSULES BY MOUTH TWICE DAILY 360 capsule 0     omeprazole 20 MG tablet Take 20 mg by mouth daily        ondansetron (ZOFRAN-ODT) 8 MG ODT tab Take 8 mg by mouth every 8 hours as needed for nausea       order for DME Equipment being ordered: Assure Compression stockings (ANNETTA) Large, closed toe, thigh-high 30-40 compression 2 Units 3     order for DME Equipment being ordered: lumbosacral belt/brace 1 Units 0     order for DME Respironics REMSTAR 60 Series Auto CPAP 9-13 cm H2O, Wisp nasal mask w/a large cushion and a chinstrap       oxyCODONE (ROXICODONE) 5 MG tablet Take 1 tablet (5 mg) by mouth every 6 hours as needed for pain (maximum 6 tablet(s) per day) 35 tablet 0     pregabalin  "(LYRICA) 150 MG capsule Take 1 capsule (150 mg) by mouth 3 times daily 270 capsule 1     pyridostigmine (MESTINON) 60 MG tablet Take 1 tablet by mouth 5 times daily       ramipril (ALTACE) 10 MG capsule Take 1 capsule (10 mg) by mouth daily 90 capsule 1     rizatriptan (MAXALT-MLT) 5 MG ODT DISSOLVE 1 TABLET BY MOUTH AT ONSET OF HEADACHE 30 tablet 0     rosuvastatin (CRESTOR) 20 MG tablet Take 1 tablet (20 mg) by mouth daily 90 tablet 1     syringe, disposable, (BD TUBERCULIN SYRINGE) 1 ML MISC Equipment being ordered: 1 ml tuberculin syringes to be used for Vitamin B12 injections. 12 each 11     syringe/needle, disp, (BD INTEGRA SYRINGE) 25G X 1\" 3 ML MISC USE FOR VITAMIN B12 INJECTIONS 12 each 0     valACYclovir (VALTREX) 500 MG tablet Take 1 tablet (500 mg) by mouth daily 90 tablet 1     vitamin B complex with vitamin C (STRESS TAB) tablet Take 1 tablet by mouth daily       ZINC SULFATE-VITAMIN C MT Take 1 tablet by mouth daily         Allergies:  Allergies   Allergen Reactions     Amoxicillin-Pot Clavulanate Difficulty breathing     Banana Shortness Of Breath     Pt reports organic Banana is okay.      Nitroglycerin Palpitations     Penicillins Anaphylaxis     Provigil [Modafinil] Shortness Of Breath     headache     Gadolinium Hives and Itching     Patient was premedicated for the contrast allergy. He did still have a reaction a few hours after injection. Hives and itching. Dr. Gomez told tech to inform pt he should only have contrast again in the future when premedicated and at a hospital. Not at an outpatient facility.      Ketoconazole      Topical cream caused swelling and itching     Dye [Contrast Dye] Other (See Comments) and Hives     Moderate flushing, CT contrast     Golimumab      Hives, bradycardia, face swelling     Neurontin [Gabapentin] Hives     Moderate hives     Nortriptyline Hives     Varicella Virus Vaccine Live      Rash     Flagyl [Metronidazole Hcl] Palpitations and Hives     Latex " "Rash     Metronidazole Palpitations, Other (See Comments) and Rash     dizziness (versus ciprofloxacin taken at same time)       Family history:  Family History   Problem Relation Age of Onset     Musculoskeletal Disorder Mother         back     Anxiety Disorder Mother      Colon Polyps Mother      Ulcerative Colitis Mother         and ischemic small intestine, surgery     Hypertension Mother      Breast Cancer Mother      Osteoporosis Mother      Diabetes Mother         Type 2, Diagnosed in 2014     Depression Mother         Takes Cymbalta to help with chronic pain + depx     Thyroid Disease Mother         Hypothyroidism     Obesity Mother         Under much better control latter half of 2015     Musculoskeletal Disorder Father         back     Substance Abuse Father      Hypertension Father      Hyperlipidemia Father      Depression Father         Off meds for many years. Seems \"ok\"     Heart Disease Maternal Grandmother      Heart Disease Maternal Grandfather      Psychotic Disorder Paternal Grandfather      Suicide Paternal Grandfather      Depression Paternal Grandfather         Pediatrician. Committed suicide by pistol in 1990.     Musculoskeletal Disorder Brother         back     Depression Brother         Expressed as anger and moodiness     Substance Abuse Brother      Substance Abuse Sister      Depression Sister         Mental Health Therapist, yet no anti-depressants?     Anxiety Disorder Sister         Mental Health Therapist, yet no anti-anxiety meds?     Other Cancer Other         Bladder Cancer - Fatal     Substance Abuse Brother      Colon Cancer No family hx of      Crohn's Disease No family hx of      Anesthesia Reaction No family hx of      Cancer No family hx of         No family history of skin cancer       Social history:  Social History     Tobacco Use     Smoking status: Never Smoker     Smokeless tobacco: Never Used   Substance Use Topics     Alcohol use: Not Currently     Alcohol/week: 0.0 " "standard drinks     Comment: occ 1/week     Marital status: single.    Nursing Notes:   Daisy Booth CMA  12/30/2021  7:21 AM  Signed  Chief Complaint   Patient presents with     Follow Up     Hemorrhoid banding follow up.       Vitals:    12/30/21 0717   BP: 137/89   BP Location: Left arm   Patient Position: Sitting   Cuff Size: Adult Large   Pulse: 66   Resp: 16   Temp: 98  F (36.7  C)   TempSrc: Oral   SpO2: 97%   Weight: 316 lb 11.2 oz   Height: 6' 6\"       Body mass index is 36.6 kg/m .          Daisy Calabrese CMA         30 minutes spent on the date of the encounter doing chart review, history and exam, documentation and further activities as noted above.     Anitha Obrien NP-C  Colon and Rectal Surgery  [facility]  "

## 2021-12-30 NOTE — NURSING NOTE
"Chief Complaint   Patient presents with     Follow Up     Hemorrhoid banding follow up.       Vitals:    12/30/21 0717   BP: 137/89   BP Location: Left arm   Patient Position: Sitting   Cuff Size: Adult Large   Pulse: 66   Resp: 16   Temp: 98  F (36.7  C)   TempSrc: Oral   SpO2: 97%   Weight: 316 lb 11.2 oz   Height: 6' 6\"       Body mass index is 36.6 kg/m .          Daisy Calabrese CMA    "

## 2021-12-30 NOTE — PROGRESS NOTES
"Colon and Rectal Surgery Follow-Up Clinic Note    RE: Joel Pineda  : 1975  MARY: 2021    Joel Pineda is a very pleasant 46 year old male here for follow-up of hemorrhoids.    HPI:  Joel has a history of diverticular disease status post sigmoidectomy with Dr. Justice in .  He has been seen for recurrent buttocks abscess, anal fissure, and proctalgia fugax with me and Dr. Jimenez.  He reportedly has had difficulty with bleeding hemorrhoids in the past and has had hemorrhoid banding at [facility] a few years ago.  He has developed a few teaspoons of blood daily to every other day again.  This is painless.  No prolapsing tissue.  Bowel movements are very soft.  He is not on any blood thinners.  He does report that he has gotten 3 additional abscesses since he was last seen here on his left buttock and his perineum.   Last colonoscopy was 9/15/20. Cecal and ascending colon polyps were found, path consistent with TA. EGD was normal.    Physical Examination: Exam was chaperoned by Daisy Calabrese MA   /89 (BP Location: Left arm, Patient Position: Sitting, Cuff Size: Adult Large)   Pulse 66   Temp 98  F (36.7  C) (Oral)   Resp 16   Ht 6' 6\"   Wt 316 lb 11.2 oz   SpO2 97%   BMI 36.60 kg/m    General: Alert, oriented, in no acute distress, sitting comfortably  HEENT: mucous membranes moist  Perianal external examination:  Perianal skin: Intact with no excoriation or lichenification.  Lesions: Scars present on left buttock, left posterior thigh, and left perianal position.  Eversion of buttocks: There was not evidence of an anal fissure. Details: N/A.  Skin tags or external hemorrhoids: None.    Digital rectal examination: Was performed.   Sphincter tone: Good.  Palpable lesions: No.  Prostate: Normal.  Other: None..    Anoscopy: Was performed.   Hemorrhoids: Yes. Grade 2-3 internal hemorrhoids without active bleeding  Lesions: No.    Procedure: After discussing " the risks and benefits, the patient agreed to proceed with internal hemorrhoidal banding.    Prior to the start of the procedure and with procedural staff participation, I verbally confirmed the patient s identity using two indicators, relevant allergies, that the procedure was appropriate and matched the consent or emergent situation, and that the correct equipment/implants were available. Immediately prior to starting the procedure I conducted the Time Out with the procedural staff and re-confirmed the patient s name, procedure, and site/side. (The Joint Commission universal protocol was followed.)  Yes    Sedation (Moderate or Deep): None    A suction hemorrhoidal  was used to place a total of 2 band(s) in the anterior and posterior position(s).    There was no significant bleeding. The patient tolerated the procedure well.    This procedure was performed under a collaborative privileging agreement with Dr. Arce, Chief of Colon and Rectal Surgery.    Assessment/Plan: 46 year old male with internal hemorrhoids and rectal bleeding. Discussed managing with banding again as he tolerated this well in the past. Two bands were placed today, which she tolerated well.  Asked him to remain off of blood thinners and avoid any heavy lifting for 2 weeks.  He has had recurrent buttocks abscesses but in different locations.  I would like him to see dermatology for this.  If they do not feel that it is a dermatologic issue, could consider pelvic MRI to rule out a fistula, although I think that this is quite unlikely given the locations of the multiple abscess sites.  We will have him return to clinic anytime after 1 month for any recurrent bleeding. Patient's questions were answered to his stated satisfaction and he is in agreement with this plan.      Medical history:  Past Medical History:   Diagnosis Date     Acne      Acquired hypothyroidism      Allergic state      Ankylosing spondylitis lumbar region (H)       Ankylosing spondylitis of sacral region (H)      Anxiety      Bipolar 2 disorder (H)      Chest pain     Chest pain, regulated w/BP meds. Clear arteries.     Chronic pain      DDD (degenerative disc disease), lumbar      Depressive disorder      Diabetes (H)      Diverticulosis      Facet arthritis of cervical region      Gastroesophageal reflux disease      Hypertension      IBS (irritable bowel syndrome)      Intracranial arachnoid cyst      Major depressive disorder, recurrent episode (H)     Multiple psych providers - they manage meds August 2015: Provigil induced severe mood dis-function      Major depressive disorder, recurrent episode, severe (H) 12/2/2020     MDD (major depressive disorder), recurrent severe, without psychosis (H) 7/21/2021     LLOYD (obstructive sleep apnea)- mild (AHI 11)      Polyneuropathy      Pulmonary embolism (H)      Skin exam, screening for cancer 12/3/2013     Sleep apnea      Uncomplicated asthma        Surgical history:  Past Surgical History:   Procedure Laterality Date     BACK SURGERY  10/07    lumbar discectomy L5-S1     COLONOSCOPY      Note: colonoscopy scheduled with [facility] on Friday, 9/4/15     COSMETIC SURGERY  2012    Nose Exterior - functional     GI SURGERY  August 2013    Sigmoidectomy     HERNIA REPAIR, UMBILICAL  8/23/11    smallDr. Evan     INCISION AND DRAINAGE, ABSCESS, COMPLEX  8/23/11    umbilical, Dr. Evan Beavers     LAPAROSCOPIC ASSISTED COLECTOMY LEFT (DESCENDING)  8/15/2013    Procedure: LAPAROSCOPIC ASSISTED COLECTOMY LEFT (DESCENDING);  Laparoscopic Hand Assisted Sigmoid Resection, Mobilization of Splenic Fissure, coloproctoscopy, *Latex Free Room* Anesthesia General with Pain block  ;  Surgeon: Aurora Justice MD;  Location: [facility] OR     NERVE SURGERY  8/18/11    RF ablation @ L3-S1 @ [facility]     RECONSTRUCT NOSE AND SEPTUM (FUNCTIONAL)  10/14/2011    Procedure:RECONSTRUCT NOSE AND SEPTUM (FUNCTIONAL); Functional Septorhinoplasty, Turbinate  Reduction, ; Surgeon:CEDRIC CUEVAS; Location:[facility] OR     SINUS SURGERY  10/1/01    ethmoidectomy chronic sinusitis       Problem list:  Patient Active Problem List    Diagnosis Date Noted     MDD (major depressive disorder), recurrent severe, without psychosis (H) 12/22/2021     Priority: Medium     Borderline personality disorder (H) 11/17/2021     Priority: Medium     Chronic pain syndrome 07/12/2021     Priority: Medium     Dysautonomia (H) 08/18/2020     Priority: Medium     PHN (postherpetic neuralgia) 07/15/2020     Priority: Medium     POTS (postural orthostatic tachycardia syndrome) 03/24/2020     Priority: Medium     Orthostatic dizziness 03/18/2020     Priority: Medium     Gastroparesis 01/24/2020     Priority: Medium     Hypertriglyceridemia 12/16/2019     Priority: Medium     Ingrown toenail 07/03/2019     Priority: Medium     History of pulmonary embolism 01/28/2019     Priority: Medium     Peripheral polyneuropathy 01/23/2019     Priority: Medium     Type 2 diabetes mellitus with complication, without long-term current use of insulin (H) 12/24/2018     Priority: Medium     DDD (degenerative disc disease), lumbar 11/13/2018     Priority: Medium     Morbid obesity due to hypertriglyceridemia (H) 05/17/2018     Priority: Medium     Fatty infiltration of liver 05/17/2018     Priority: Medium     Ankylosing spondylitis of sacral region (H) 02/28/2018     Priority: Medium     Chronic, continuous use of opioids 04/04/2017     Priority: Medium     Patient is followed by Ksenia Lyles MD for ongoing prescription of pain medication.  All refills should only be approved by this provider, or covering partner.    Medication(s): oxy 5.   Maximum quantity per month: 40  Clinic visit frequency required: Q3  months     Controlled substance agreement: 6/23/2020  UDS: Aug 2019    Encounter-Level CSA - 04/04/2017:          Controlled Substance Agreement - Scan on 4/11/2017  1:30 PM : CONTROLLED SUBSTANCE  AGREEMENT (below)              Pain Clinic evaluation in the past: Yes    DIRE Total Score(s):  No flowsheet data found.    Last [facility] website verification:  12/2/2020   [email or web address]        Essential hypertension with goal blood pressure less than 140/90 11/01/2016     Priority: Medium     B12 deficiency 08/29/2016     Priority: Medium     Irritable bowel syndrome with diarrhea 08/19/2016     Priority: Medium     Chronic midline low back pain without sciatica 06/06/2016     Priority: Medium     Bipolar 2 disorder (H) 12/28/2015     Priority: Medium     Acquired hypothyroidism 10/08/2015     Priority: Medium     Facet arthritis of cervical region 10/06/2015     Priority: Medium     Intracranial arachnoid cyst 10/02/2015     Priority: Medium     LLOYD (obstructive sleep apnea)- mild (AHI 11) 01/31/2015     Priority: Medium     Study Date: 1/27/2015- (308.1 lbs)  Snoring was reported assoft to moderate.  Lowest oxygen saturation was 88.0%. Apnea/Hypopnea Index was 11.5 events per hour. REM was not seen.  The supine AHI is 12.7.  RDI was  25.7. PLM index was 0 per hour.  Sleep study 10/31/16 [facility] (292#) CPAP 8 cm effective       Generalized anxiety disorder 01/12/2015     Priority: Medium     GERD (gastroesophageal reflux disease)      Priority: Medium     Chronic nonallergic rhinitis      Priority: Medium     RAST all NEGATIVE for environmental allergens, IgE= 6 (normal)       Hyperlipidemia LDL goal <100      Priority: Medium     Intermittent asthma      Priority: Medium     Exercise-induced       Diverticulosis 09/01/2006     Priority: Medium     Recurrent diverticulitis, S/p sigmoid resction 8/2013         Medications:  Current Outpatient Medications   Medication Sig Dispense Refill     acetaminophen 500 MG CAPS Take 1,000 mg by mouth 2 times daily  60 capsule      albuterol (PROAIR HFA/PROVENTIL HFA/VENTOLIN HFA) 108 (90 Base) MCG/ACT inhaler Inhale 2 puffs into the lungs every 4 hours as needed  for shortness of breath / dyspnea or wheezing 8.5 g 11     aspirin (ASA) 81 MG tablet Take 81 mg by mouth daily        cetirizine (ZYRTEC) 10 MG tablet Take 1 tablet (10 mg) by mouth At Bedtime 30 tablet 11     cholecalciferol (D3-50) 1250 mcg (86203 units) capsule TAKE 1 CAPSULE BY MOUTH EVERY 2 WEEKS 24 capsule 0     clonazePAM (KLONOPIN) 0.5 MG tablet Take 0.5 mg by mouth 2 times daily as needed        cyanocobalamin (CYANOCOBALAMIN) 1000 MCG/ML injection INJECT 1 ML INTO THE MUSCLE EVERY 30 DAYS 10 mL 0     dronabinol (MARINOL) 5 MG capsule Take 1 capsule by mouth 2 times daily       DULoxetine (CYMBALTA) 60 MG capsule Take 120 mg by mouth daily        EPINEPHrine (ANY BX GENERIC EQUIV) 0.3 MG/0.3ML injection 2-pack Inject 0.3 mLs (0.3 mg) into the muscle once as needed for anaphylaxis 0.6 mL 3     ergocalciferol (ERGOCALCIFEROL) 1.25 MG (63629 UT) capsule take 1 capsule by ORAL route every 2 weeks       eszopiclone (LUNESTA) 3 MG tablet Take 3 mg by mouth At Bedtime        etanercept (ENBREL SURECLICK) 50 MG/ML autoinjector Inject 50 mg Subcutaneous once a week . Hold for signs of infection, and seek medical attention. 4 mL 7     famotidine (PEPCID) 20 MG tablet Prior to administration of Humira every 2 weeks (Patient taking differently: Take 20 mg by mouth every 7 days Prior to Enbrel Injections to prevent skin reaction)       fenofibrate (TRICOR) 48 MG tablet Take 1 tablet (48 mg) by mouth daily 90 tablet 1     fluticasone (FLONASE) 50 MCG/ACT nasal spray Spray 1 spray into both nostrils daily       fluticasone-salmeterol (ADVAIR) 250-50 MCG/DOSE inhaler Inhale 1 puff into the lungs every 12 hours 14 each 11     LATUDA 40 MG TABS tablet Take 60 mg by mouth daily        levothyroxine (SYNTHROID/LEVOTHROID) 75 MCG tablet TAKE 1 TABLET EVERY MORNING 90 tablet 3     lidocaine (XYLOCAINE) 5 % external ointment APPLY TOPICALLY 4 TIMES DAILY AS NEEDED FOR PAIN  50 g 2     melatonin 3 MG tablet Take 13 mg by mouth  nightly as needed        methocarbamol (ROBAXIN) 500 MG tablet Take 1-2 tablets (500-1,000 mg) by mouth 4 times daily as needed for muscle spasms 240 tablet 1     metoclopramide (REGLAN) 5 MG tablet Take 5 mg by mouth 2 times daily       metoprolol succinate ER (TOPROL-XL) 200 MG 24 hr tablet TAKE ONE TABLET BY MOUTH TWICE DAILY  180 tablet 1     montelukast (SINGULAIR) 10 MG tablet Take 1 tablet (10 mg) by mouth every evening 90 tablet 3     nabumetone (RELAFEN) 500 MG tablet TAKE ONE OR TWO TABLETS BY MOUTH TWICE DAILY AS NEEDED FOR MODERATE PAIN  WITH FOOD 120 tablet 0     naloxone (NARCAN) 4 MG/0.1ML nasal spray Spray 1 spray (4 mg) into one nostril alternating nostrils as needed for opioid reversal every 2-3 minutes until assistance arrives 0.2 mL 0     olopatadine (PATADAY) 0.2 % ophthalmic solution Place 1 drop into both eyes daily 2.5 mL 3     omega-3 acid ethyl esters (LOVAZA) 1 g capsule TAKE TWO CAPSULES BY MOUTH TWICE DAILY 360 capsule 0     omeprazole 20 MG tablet Take 20 mg by mouth daily        ondansetron (ZOFRAN-ODT) 8 MG ODT tab Take 8 mg by mouth every 8 hours as needed for nausea       order for DME Equipment being ordered: Assure Compression stockings (ANNETTA) Large, closed toe, thigh-high 30-40 compression 2 Units 3     order for DME Equipment being ordered: lumbosacral belt/brace 1 Units 0     order for DME Respironics REMSTAR 60 Series Auto CPAP 9-13 cm H2O, Wisp nasal mask w/a large cushion and a chinstrap       oxyCODONE (ROXICODONE) 5 MG tablet Take 1 tablet (5 mg) by mouth every 6 hours as needed for pain (maximum 6 tablet(s) per day) 35 tablet 0     pregabalin (LYRICA) 150 MG capsule Take 1 capsule (150 mg) by mouth 3 times daily 270 capsule 1     pyridostigmine (MESTINON) 60 MG tablet Take 1 tablet by mouth 5 times daily       ramipril (ALTACE) 10 MG capsule Take 1 capsule (10 mg) by mouth daily 90 capsule 1     rizatriptan (MAXALT-MLT) 5 MG ODT DISSOLVE 1 TABLET BY MOUTH AT ONSET OF  "HEADACHE 30 tablet 0     rosuvastatin (CRESTOR) 20 MG tablet Take 1 tablet (20 mg) by mouth daily 90 tablet 1     syringe, disposable, (BD TUBERCULIN SYRINGE) 1 ML MISC Equipment being ordered: 1 ml tuberculin syringes to be used for Vitamin B12 injections. 12 each 11     syringe/needle, disp, (BD INTEGRA SYRINGE) 25G X 1\" 3 ML MISC USE FOR VITAMIN B12 INJECTIONS 12 each 0     valACYclovir (VALTREX) 500 MG tablet Take 1 tablet (500 mg) by mouth daily 90 tablet 1     vitamin B complex with vitamin C (STRESS TAB) tablet Take 1 tablet by mouth daily       ZINC SULFATE-VITAMIN C MT Take 1 tablet by mouth daily         Allergies:  Allergies   Allergen Reactions     Amoxicillin-Pot Clavulanate Difficulty breathing     Banana Shortness Of Breath     Pt reports organic Banana is okay.      Nitroglycerin Palpitations     Penicillins Anaphylaxis     Provigil [Modafinil] Shortness Of Breath     headache     Gadolinium Hives and Itching     Patient was premedicated for the contrast allergy. He did still have a reaction a few hours after injection. Hives and itching. Dr. Gomez told tech to inform pt he should only have contrast again in the future when premedicated and at a hospital. Not at an outpatient facility.      Ketoconazole      Topical cream caused swelling and itching     Dye [Contrast Dye] Other (See Comments) and Hives     Moderate flushing, CT contrast     Golimumab      Hives, bradycardia, face swelling     Neurontin [Gabapentin] Hives     Moderate hives     Nortriptyline Hives     Varicella Virus Vaccine Live      Rash     Flagyl [Metronidazole Hcl] Palpitations and Hives     Latex Rash     Metronidazole Palpitations, Other (See Comments) and Rash     dizziness (versus ciprofloxacin taken at same time)       Family history:  Family History   Problem Relation Age of Onset     Musculoskeletal Disorder Mother         back     Anxiety Disorder Mother      Colon Polyps Mother      Ulcerative Colitis Mother       " "  and ischemic small intestine, surgery     Hypertension Mother      Breast Cancer Mother      Osteoporosis Mother      Diabetes Mother         Type 2, Diagnosed in 2014     Depression Mother         Takes Cymbalta to help with chronic pain + depx     Thyroid Disease Mother         Hypothyroidism     Obesity Mother         Under much better control latter half of 2015     Musculoskeletal Disorder Father         back     Substance Abuse Father      Hypertension Father      Hyperlipidemia Father      Depression Father         Off meds for many years. Seems \"ok\"     Heart Disease Maternal Grandmother      Heart Disease Maternal Grandfather      Psychotic Disorder Paternal Grandfather      Suicide Paternal Grandfather      Depression Paternal Grandfather         Pediatrician. Committed suicide by pistol in 1990.     Musculoskeletal Disorder Brother         back     Depression Brother         Expressed as anger and moodiness     Substance Abuse Brother      Substance Abuse Sister      Depression Sister         Mental Health Therapist, yet no anti-depressants?     Anxiety Disorder Sister         Mental Health Therapist, yet no anti-anxiety meds?     Other Cancer Other         Bladder Cancer - Fatal     Substance Abuse Brother      Colon Cancer No family hx of      Crohn's Disease No family hx of      Anesthesia Reaction No family hx of      Cancer No family hx of         No family history of skin cancer       Social history:  Social History     Tobacco Use     Smoking status: Never Smoker     Smokeless tobacco: Never Used   Substance Use Topics     Alcohol use: Not Currently     Alcohol/week: 0.0 standard drinks     Comment: occ 1/week     Marital status: single.    Nursing Notes:   Daisy Booth CMA  12/30/2021  7:21 AM  Signed  Chief Complaint   Patient presents with     Follow Up     Hemorrhoid banding follow up.       Vitals:    12/30/21 0717   BP: 137/89   BP Location: Left arm   Patient Position: " "Sitting   Cuff Size: Adult Large   Pulse: 66   Resp: 16   Temp: 98  F (36.7  C)   TempSrc: Oral   SpO2: 97%   Weight: 316 lb 11.2 oz   Height: 6' 6\"       Body mass index is 36.6 kg/m .          Daisy Calabrese CMA         30 minutes spent on the date of the encounter doing chart review, history and exam, documentation and further activities as noted above.     Anitha Obrien NP-C  Colon and Rectal Surgery  [facility]    "

## 2021-12-31 ENCOUNTER — OFFICE VISIT (OUTPATIENT)
Dept: DERMATOLOGY | Facility: CLINIC | Age: 46
End: 2021-12-31
Attending: NURSE PRACTITIONER
Payer: MEDICARE

## 2021-12-31 DIAGNOSIS — D84.9 IMMUNOSUPPRESSION (H): Primary | ICD-10-CM

## 2021-12-31 DIAGNOSIS — L73.9 FOLLICULITIS: ICD-10-CM

## 2021-12-31 PROCEDURE — 99204 OFFICE O/P NEW MOD 45 MIN: CPT | Performed by: DERMATOLOGY

## 2021-12-31 RX ORDER — CLINDAMYCIN PHOSPHATE 10 MG/G
GEL TOPICAL
Qty: 60 G | Refills: 3 | Status: SHIPPED | OUTPATIENT
Start: 2021-12-31 | End: 2022-11-10

## 2021-12-31 NOTE — PROGRESS NOTES
[facility] Dermatology Note  Encounter Date: Dec 31, 2021  Office Visit     Dermatology Problem List:  1. Immunosuppresion for ankylosing spondylitis, on Enbrel.  -off Enbrel at this time for shingles, plan is to reinitiate  2. Acne vulgaris s/p Accutane in 1990's  3. Folliculitis  - BPO wash, Hibiclens, clobetasol for flares    ____________________________________________    Assessment & Plan:    # Folliculitis. Possible HS component as well based on patient history, but no active abscesses noted on exam today. Patient with a history of immunosuppression, but not currently on Enbrel due to shingles.   - Recommend using BPO wash or Hibiclens to prevent recurrence of abscesses   - For flares, apply clobetasol BID   - Release of information from [facility] signed today.  - consider oral doxycycline    # History of immunosuppression.    - Recommend returning for a skin exam.   -Recommend sunscreens SPF #30 or greater, protective clothing and avoidance of tanning beds.      Procedures Performed:   None.    Follow-up: within   3 months.   Staff and Scribe:     Scribe Disclosure:   I, Gera Bustos, am serving as a scribe to document services personally performed by this physician, Dr. Janice Gonzales, based on data collection and the provider's statements to me.     Provider Disclosure:   The documentation recorded by the scribe accurately reflects the services I personally performed and the decisions made by me.    Janice Gonzales MD    Department of Dermatology  [facility]: Phone: [phone], Fax:[phone]  [facility]: Phone: [phone], Fax: [phone]      ____________________________________________    CC: Derm Problem (HX OF ABCESSES ON BUTTOCKS/ PERINEUM/  MULTIPLE I&D'S)    HPI:  Mr. Joel Pineda is a(n) 46 year old male who presents today as a new  patient for evaluation of abscesses.    Referred to derm yesterday by Anitha Farrell CNP for abscess of multiple sites of buttocks. Note from 12/30/21 reviewed. A&P notes recurrent buttocks abscesses, but in differetn locations. Referred to derm for further evaluation.    Today, he reports he has had perianal abscesses annually over the past 4-5 years. He has no active abscesses today. He had had I&Ds at the ED in the past. His largest once drained on it's own, and he felt the entire area harden. Since 11/2021, he has had three abscesses. He notes no areas recurred in the same spot. He is immunosuppressed for ankylosing spondylitis, currently on Enbrel. Of note, he has been seen by [facility] in [facility] x 1 year.    Patient is otherwise feeling well, without additional skin concerns.    Labs Reviewed:  N/A    Physical Exam:  Vitals: There were no vitals taken for this visit.  SKIN: Focused examination of buttocks, back and axilla was performed.  - perifollicular papules on the back  - Folliculitis on the buttocks  - No perianal lesions noted  - No scarring in the axilla, bilaterally  - Pigmented lesions not ascessed today  - No other lesions of concern on areas examined.     Medications:  Current Outpatient Medications   Medication     acetaminophen 500 MG CAPS     albuterol (PROAIR HFA/PROVENTIL HFA/VENTOLIN HFA) 108 (90 Base) MCG/ACT inhaler     aspirin (ASA) 81 MG tablet     cetirizine (ZYRTEC) 10 MG tablet     cholecalciferol (D3-50) 1250 mcg (17017 units) capsule     clonazePAM (KLONOPIN) 0.5 MG tablet     cyanocobalamin (CYANOCOBALAMIN) 1000 MCG/ML injection     dronabinol (MARINOL) 5 MG capsule     DULoxetine (CYMBALTA) 60 MG capsule     EPINEPHrine (ANY BX GENERIC EQUIV) 0.3 MG/0.3ML injection 2-pack     ergocalciferol (ERGOCALCIFEROL) 1.25 MG (50505 UT) capsule     eszopiclone (LUNESTA) 3 MG tablet     etanercept (ENBREL SURECLICK) 50 MG/ML autoinjector     famotidine (PEPCID) 20 MG  "tablet     fenofibrate (TRICOR) 48 MG tablet     fluticasone (FLONASE) 50 MCG/ACT nasal spray     fluticasone-salmeterol (ADVAIR) 250-50 MCG/DOSE inhaler     LATUDA 40 MG TABS tablet     levothyroxine (SYNTHROID/LEVOTHROID) 75 MCG tablet     lidocaine (XYLOCAINE) 5 % external ointment     melatonin 3 MG tablet     methocarbamol (ROBAXIN) 500 MG tablet     metoclopramide (REGLAN) 5 MG tablet     metoprolol succinate ER (TOPROL-XL) 200 MG 24 hr tablet     montelukast (SINGULAIR) 10 MG tablet     nabumetone (RELAFEN) 500 MG tablet     naloxone (NARCAN) 4 MG/0.1ML nasal spray     olopatadine (PATADAY) 0.2 % ophthalmic solution     omega-3 acid ethyl esters (LOVAZA) 1 g capsule     omeprazole 20 MG tablet     ondansetron (ZOFRAN-ODT) 8 MG ODT tab     order for DME     order for DME     order for DME     oxyCODONE (ROXICODONE) 5 MG tablet     pregabalin (LYRICA) 150 MG capsule     pyridostigmine (MESTINON) 60 MG tablet     ramipril (ALTACE) 10 MG capsule     rizatriptan (MAXALT-MLT) 5 MG ODT     rosuvastatin (CRESTOR) 20 MG tablet     syringe, disposable, (BD TUBERCULIN SYRINGE) 1 ML MISC     syringe/needle, disp, (BD INTEGRA SYRINGE) 25G X 1\" 3 ML MISC     valACYclovir (VALTREX) 500 MG tablet     vitamin B complex with vitamin C (STRESS TAB) tablet     ZINC SULFATE-VITAMIN C MT     No current facility-administered medications for this visit.      Past Medical History:   Patient Active Problem List   Diagnosis     Intermittent asthma     Hyperlipidemia LDL goal <100     Chronic nonallergic rhinitis     Diverticulosis     GERD (gastroesophageal reflux disease)     Generalized anxiety disorder     LLOYD (obstructive sleep apnea)- mild (AHI 11)     Intracranial arachnoid cyst     Facet arthritis of cervical region     Acquired hypothyroidism     Bipolar 2 disorder (H)     Chronic midline low back pain without sciatica     Irritable bowel syndrome with diarrhea     B12 deficiency     Essential hypertension with goal blood " pressure less than 140/90     Chronic, continuous use of opioids     Ankylosing spondylitis of sacral region (H)     Morbid obesity due to hypertriglyceridemia (H)     Fatty infiltration of liver     DDD (degenerative disc disease), lumbar     Type 2 diabetes mellitus with complication, without long-term current use of insulin (H)     Peripheral polyneuropathy     History of pulmonary embolism     Ingrown toenail     Hypertriglyceridemia     Gastroparesis     Orthostatic dizziness     POTS (postural orthostatic tachycardia syndrome)     PHN (postherpetic neuralgia)     Dysautonomia (H)     Chronic pain syndrome     Borderline personality disorder (H)     MDD (major depressive disorder), recurrent severe, without psychosis (H)     Past Medical History:   Diagnosis Date     Acne      Acquired hypothyroidism      Allergic state      Ankylosing spondylitis lumbar region (H)      Ankylosing spondylitis of sacral region (H)      Anxiety      Bipolar 2 disorder (H)      Chest pain     Chest pain, regulated w/BP meds. Clear arteries.     Chronic pain      DDD (degenerative disc disease), lumbar      Depressive disorder      Diabetes (H)      Diverticulosis      Facet arthritis of cervical region      Gastroesophageal reflux disease      Hypertension      IBS (irritable bowel syndrome)      Intracranial arachnoid cyst      Major depressive disorder, recurrent episode (H)     Multiple psych providers - they manage meds August 2015: Provigil induced severe mood dis-function      Major depressive disorder, recurrent episode, severe (H) 12/2/2020     MDD (major depressive disorder), recurrent severe, without psychosis (H) 7/21/2021     LLOYD (obstructive sleep apnea)- mild (AHI 11)      Polyneuropathy      Pulmonary embolism (H)      Skin exam, screening for cancer 12/3/2013     Sleep apnea      Uncomplicated asthma         CC Anitha Farrell, APRN CNP  [address] [place] [address] on close of this  encounter.

## 2021-12-31 NOTE — NURSING NOTE
Joel Pineda's goals for this visit include:   Chief Complaint   Patient presents with     Derm Problem     HX OF ABCESSES ON BUTTOCKS/ PERINEUM/  MULTIPLE I&D'S     He requests these members of his care team be copied on today's visit information:     PCP: Ksenia Lyles    Referring Provider:  JOCELYN Nava CNP  [address] [facility] [address]  [place] [address]    There were no vitals taken for this visit.    Do you need any medication refills at today's visit? NO    Sandy Menjivar, LUDA on 12/31/2021 at 10:57 AM

## 2021-12-31 NOTE — LETTER
12/31/2021         RE: Joel Pineda  [address] [place] [address]        Dear Colleague,    Thank you for referring your patient, Joel Pineda, to the [facility]. Please see a copy of my visit note below.    [facility] Dermatology Note  Encounter Date: Dec 31, 2021  Office Visit     Dermatology Problem List:  1. Immunosuppresion for ankylosing spondylitis, on Enbrel.  -off Enbrel at this time for shingles, plan is to reinitiate  2. Acne vulgaris s/p Accutane in 1990's  3. Folliculitis  - BPO wash, Hibiclens, clobetasol for flares    ____________________________________________    Assessment & Plan:    # Folliculitis. Possible HS component as well based on patient history, but no active abscesses noted on exam today. Patient with a history of immunosuppression, but not currently on Enbrel due to shingles.   - Recommend using BPO wash or Hibiclens to prevent recurrence of abscesses   - For flares, apply clobetasol BID   - Release of information from [facility] signed today.  - consider oral doxycycline    # History of immunosuppression.    - Recommend returning for a skin exam.   -Recommend sunscreens SPF #30 or greater, protective clothing and avoidance of tanning beds.      Procedures Performed:   None.    Follow-up: within   3 months.   Staff and Scribe:     Scribe Disclosure:   I, Gera Bustos, am serving as a scribe to document services personally performed by this physician, Dr. Janice Gonzales, based on data collection and the provider's statements to me.     Provider Disclosure:   The documentation recorded by the scribe accurately reflects the services I personally performed and the decisions made by me.    Janice Gonzales MD    Department of Dermatology  [facility]: Phone: [phone], Fax:[phone]  [facility]: Phone: [phone], Fax: [phone]      ____________________________________________    CC: Derm Problem (HX OF ABCESSES ON BUTTOCKS/ PERINEUM/  MULTIPLE I&D'S)    HPI:  Mr. Joel Pineda is a(n) 46 year old male who presents today as a new patient for evaluation of abscesses.    Referred to derm yesterday by Anitha Farrell CNP for abscess of multiple sites of buttocks. Note from 12/30/21 reviewed. A&P notes recurrent buttocks abscesses, but in differetn locations. Referred to derm for further evaluation.    Today, he reports he has had perianal abscesses annually over the past 4-5 years. He has no active abscesses today. He had had I&Ds at the ED in the past. His largest once drained on it's own, and he felt the entire area harden. Since 11/2021, he has had three abscesses. He notes no areas recurred in the same spot. He is immunosuppressed for ankylosing spondylitis, currently on Enbrel. Of note, he has been seen by [facility] in [facility] x 1 year.    Patient is otherwise feeling well, without additional skin concerns.    Labs Reviewed:  N/A    Physical Exam:  Vitals: There were no vitals taken for this visit.  SKIN: Focused examination of buttocks, back and axilla was performed.  - perifollicular papules on the back  - Folliculitis on the buttocks  - No perianal lesions noted  - No scarring in the axilla, bilaterally  - Pigmented lesions not ascessed today  - No other lesions of concern on areas examined.     Medications:  Current Outpatient Medications   Medication     acetaminophen 500 MG CAPS     albuterol (PROAIR HFA/PROVENTIL HFA/VENTOLIN HFA) 108 (90 Base) MCG/ACT inhaler     aspirin (ASA) 81 MG tablet     cetirizine (ZYRTEC) 10 MG tablet     cholecalciferol (D3-50) 1250 mcg (08724 units) capsule     clonazePAM (KLONOPIN) 0.5 MG tablet     cyanocobalamin (CYANOCOBALAMIN) 1000 MCG/ML injection     dronabinol (MARINOL) 5 MG capsule     DULoxetine (CYMBALTA) 60 MG capsule      "EPINEPHrine (ANY BX GENERIC EQUIV) 0.3 MG/0.3ML injection 2-pack     ergocalciferol (ERGOCALCIFEROL) 1.25 MG (19358 UT) capsule     eszopiclone (LUNESTA) 3 MG tablet     etanercept (ENBREL SURECLICK) 50 MG/ML autoinjector     famotidine (PEPCID) 20 MG tablet     fenofibrate (TRICOR) 48 MG tablet     fluticasone (FLONASE) 50 MCG/ACT nasal spray     fluticasone-salmeterol (ADVAIR) 250-50 MCG/DOSE inhaler     LATUDA 40 MG TABS tablet     levothyroxine (SYNTHROID/LEVOTHROID) 75 MCG tablet     lidocaine (XYLOCAINE) 5 % external ointment     melatonin 3 MG tablet     methocarbamol (ROBAXIN) 500 MG tablet     metoclopramide (REGLAN) 5 MG tablet     metoprolol succinate ER (TOPROL-XL) 200 MG 24 hr tablet     montelukast (SINGULAIR) 10 MG tablet     nabumetone (RELAFEN) 500 MG tablet     naloxone (NARCAN) 4 MG/0.1ML nasal spray     olopatadine (PATADAY) 0.2 % ophthalmic solution     omega-3 acid ethyl esters (LOVAZA) 1 g capsule     omeprazole 20 MG tablet     ondansetron (ZOFRAN-ODT) 8 MG ODT tab     order for DME     order for DME     order for DME     oxyCODONE (ROXICODONE) 5 MG tablet     pregabalin (LYRICA) 150 MG capsule     pyridostigmine (MESTINON) 60 MG tablet     ramipril (ALTACE) 10 MG capsule     rizatriptan (MAXALT-MLT) 5 MG ODT     rosuvastatin (CRESTOR) 20 MG tablet     syringe, disposable, (BD TUBERCULIN SYRINGE) 1 ML MISC     syringe/needle, disp, (BD INTEGRA SYRINGE) 25G X 1\" 3 ML MISC     valACYclovir (VALTREX) 500 MG tablet     vitamin B complex with vitamin C (STRESS TAB) tablet     ZINC SULFATE-VITAMIN C MT     No current facility-administered medications for this visit.      Past Medical History:   Patient Active Problem List   Diagnosis     Intermittent asthma     Hyperlipidemia LDL goal <100     Chronic nonallergic rhinitis     Diverticulosis     GERD (gastroesophageal reflux disease)     Generalized anxiety disorder     LLOYD (obstructive sleep apnea)- mild (AHI 11)     Intracranial arachnoid cyst     " Facet arthritis of cervical region     Acquired hypothyroidism     Bipolar 2 disorder (H)     Chronic midline low back pain without sciatica     Irritable bowel syndrome with diarrhea     B12 deficiency     Essential hypertension with goal blood pressure less than 140/90     Chronic, continuous use of opioids     Ankylosing spondylitis of sacral region (H)     Morbid obesity due to hypertriglyceridemia (H)     Fatty infiltration of liver     DDD (degenerative disc disease), lumbar     Type 2 diabetes mellitus with complication, without long-term current use of insulin (H)     Peripheral polyneuropathy     History of pulmonary embolism     Ingrown toenail     Hypertriglyceridemia     Gastroparesis     Orthostatic dizziness     POTS (postural orthostatic tachycardia syndrome)     PHN (postherpetic neuralgia)     Dysautonomia (H)     Chronic pain syndrome     Borderline personality disorder (H)     MDD (major depressive disorder), recurrent severe, without psychosis (H)     Past Medical History:   Diagnosis Date     Acne      Acquired hypothyroidism      Allergic state      Ankylosing spondylitis lumbar region (H)      Ankylosing spondylitis of sacral region (H)      Anxiety      Bipolar 2 disorder (H)      Chest pain     Chest pain, regulated w/BP meds. Clear arteries.     Chronic pain      DDD (degenerative disc disease), lumbar      Depressive disorder      Diabetes (H)      Diverticulosis      Facet arthritis of cervical region      Gastroesophageal reflux disease      Hypertension      IBS (irritable bowel syndrome)      Intracranial arachnoid cyst      Major depressive disorder, recurrent episode (H)     Multiple psych providers - they manage meds August 2015: Provigil induced severe mood dis-function      Major depressive disorder, recurrent episode, severe (H) 12/2/2020     MDD (major depressive disorder), recurrent severe, without psychosis (H) 7/21/2021     LLOYD (obstructive sleep apnea)- mild (AHI 11)       Polyneuropathy      Pulmonary embolism (H)      Skin exam, screening for cancer 12/3/2013     Sleep apnea      Uncomplicated asthma         CC Anitha Farrell, APRN CNP  [address] [facility] [address]  [place] [address] on close of this encounter.      Again, thank you for allowing me to participate in the care of your patient.        Sincerely,        Janice Gonzales MD

## 2021-12-31 NOTE — PATIENT INSTRUCTIONS
Possibly  Hidradenitis supparativa    Keep hibiclens out of eye if you choose this one can cause corneal damage or blindness      Start over-the-counter benzoyl peroxide 10% wash on the body mixed with water.  If 10% is too irritating you can use the 5%. ([employer] makes this product. It is available here at the pharmacy or at [employer]). This medication can bleach your towels and clothing.     It is found in a purple tube in the acne aisle.

## 2022-01-04 ENCOUNTER — OFFICE VISIT (OUTPATIENT)
Dept: NEUROLOGY | Facility: CLINIC | Age: 47
End: 2022-01-04
Attending: NEUROLOGICAL SURGERY
Payer: MEDICARE

## 2022-01-04 DIAGNOSIS — G62.9 PERIPHERAL POLYNEUROPATHY: ICD-10-CM

## 2022-01-04 NOTE — LETTER
2022     RE: Joel Pineda  [address] [facility] [place] [address]     Dear Colleague,    Thank you for referring your patient, Joel Pinead, to the [facility] at [facility]. Please see a copy of my visit note below.        [facility]  Electrodiagnostic Laboratory    Nerve Conduction & EMG Report          Patient:       Joel Pineda  Patient ID:    [id number]  Gender:        Male  YOB: 1975  Age:           46 Years 6 Months        History & Examination:  46 year old man with anklylosing spondylitis treated with TNF inhibitors and prior low back surgery () who developed difficulty manipulating his right foot in mid 2021. NCS/EMG study performed on 21 revealed a severe left-sided deep peroneal neuropathy and a moderate right-sided deep peroneal neuropathy, but was unable to determine if the lesion was recent/acute or chronic. This study is performed to better characterize the peroneal neuropathies.     Techniques: Motor and sensory conduction studies were done with surface recording electrodes. EMG was done with a concentric needle electrode.      Results:  Nerve conduction studies:   1. Bilateral sural and superficial peroneal sensory responses are normal.   2. Left peroneal-EDB motor response is absent.   3. Right peroneal-EDB motor response shows moderately prolonged DL, moderately reduced amplitude and moderately slowed CV in the foreleg and across knee segments.   4. Bilateral tibial-AH motor responses are normal.      Needle EM. No abnormal spontaneous activity was appreciated in the sampled muscles.   2. Large amplitude and/or long duration motor unit potentials (MUP) were seen in the right TA and bilateral gastrocnemius muscles.   3. Rapidly firing MUPs with reduced recruitment were seen in the right TA muscle.     Interpretation:  This is an abnormal study. As was seen on the 21 study  there is electrophysiologic evidence of  (1) a severe left-sided deep peroneal neuropathy and (2) a moderate right-sided deep peroneal neuropathy. Compared to the 11/2/21 study motor and sensory nerve conductions are unchanged and there has been no interval development of denervation changes in peroneal innervated muscles. These observations suggests that the peroneal neuropathies are chronic/remote, and argue against an active/recent or evolving neuropathic disorder in the lower limbs. Clinical correlation is recommended.     Ramakrishna Badillo MD  Department of Neurology         Sensory NCS      Nerve / Sites Rec. Site Onset Peak NP Amp Ref. PP Amp Dist Patrick Ref. Temp     ms ms  V  V  V cm m/s m/s  C   L SURAL - Lat Mall      Calf Ankle 2.81 3.70 8.3 8.0 3.9 14 49.8 38.0 31.2   R SURAL - Lat Mall      Calf Ankle 3.02 3.91 8.4 8.0 10.2 14 46.3 38.0 30.7   R SUP PERONEAL      Lat Leg Rosario 3.28 4.90 2.3  2.7 12.5 38.1 38.0 31.2   L SUP PERONEAL      Lat Leg Rosario 3.18 4.69 3.3  2.6 12.5 39.3 38.0 31.2       Motor NCS      Nerve / Sites Rec. Site Lat Ref. Amp Ref. Rel Amp Dist Patrick Ref. Dur. Area Temp.     ms ms mV mV % cm m/s m/s ms %  C   L DEEP PERONEAL - EDB      Ankle EDB NR 6.00 NR 2.5 NR 8   NR NR 32.6   R DEEP PERONEAL - EDB      Ankle EDB 7.29 6.00 0.6 2.5 100 8   8.44 100 31.3      FibHead EDB 18.85  0.6  102 36 31.1 38.0 18.33 164 31.3      Pop Fos EDB 21.20  0.6  107 8 34.1 38.0 48.44 267 31.5   L TIBIAL - AH      Ankle AH 5.36 6.00 7.8 4.0 100 8   7.45 100 31.9   R TIBIAL - AH      Ankle AH 3.07 6.00 6.6 4.0 100 8   8.54 100 31.2       EMG Summary Table     Spontaneous MUAP Recruitment    IA Fib/PSW Fasc H.F. Amp Dur. PPP Pattern   R. TIB ANTERIOR N None None None 2+ N N MildReduced   R. GASTROCN (MED) N None None None N 1+ 2+ Normal   L. TIB ANTERIOR N None None None N N N Normal   L. GASTROCN (MED) N None None None 1+ N N Normal                            [facility]  Electrodiagnostic  Laboratory    Nerve Conduction & EMG Report          Patient:       Joel Pineda  Patient ID:    [id number]  Gender:        Male  YOB: 1975  Age:           46 Years 6 Months        History & Examination:      Techniques: Motor and sensory conduction studies were done with surface recording electrodes. EMG was done with a concentric needle electrode.        Results:      Interpretation:        EMG Physician:         Sensory NCS      Nerve / Sites Rec. Site Onset Peak NP Amp Ref. PP Amp Dist Patrick Ref. Temp     ms ms  V  V  V cm m/s m/s  C   L SURAL - Lat Mall      Calf Ankle 2.81 3.70 8.3 8.0 3.9 14 49.8 38.0 31.2   R SURAL - Lat Mall      Calf Ankle 3.02 3.91 8.4 8.0 10.2 14 46.3 38.0 30.7   R SUP PERONEAL      Lat Leg Rosario 3.28 4.90 2.3  2.7 12.5 38.1 38.0 31.2   L SUP PERONEAL      Lat Leg Rosario 3.18 4.69 3.3  2.6 12.5 39.3 38.0 31.2       Motor NCS      Nerve / Sites Rec. Site Lat Ref. Amp Ref. Rel Amp Dist Patrick Ref. Dur. Area Temp.     ms ms mV mV % cm m/s m/s ms %  C   L DEEP PERONEAL - EDB      Ankle EDB NR 6.00 NR 2.5 NR 8   NR NR 32.6   R DEEP PERONEAL - EDB      Ankle EDB 7.29 6.00 0.6 2.5 100 8   8.44 100 31.3      FibHead EDB 18.85  0.6  102 36 31.1 38.0 18.33 164 31.3      Pop Fos EDB 21.20  0.6  107 8 34.1 38.0 48.44 267 31.5   L TIBIAL - AH      Ankle AH 5.36 6.00 7.8 4.0 100 8   7.45 100 31.9   R TIBIAL - AH      Ankle AH 3.07 6.00 6.6 4.0 100 8   8.54 100 31.2       EMG Summary Table     Spontaneous MUAP Recruitment    IA Fib/PSW Fasc H.F. Amp Dur. PPP Pattern   R. TIB ANTERIOR N None None None 2+ N N Normal   R. GASTROCN (MED) N None None None N 1+ 2+ Normal   L. TIB ANTERIOR N None None None N N N Normal   L. GASTROCN (MED) N None None None 1+ N N Normal                      Again, thank you for allowing me to participate in the care of your patient.      Sincerely,    Ramakrishna Badillo MD

## 2022-01-04 NOTE — PROGRESS NOTES
[facility]  Electrodiagnostic Laboratory    Nerve Conduction & EMG Report          Patient:       Joel Pineda  Patient ID:    [id number]  Gender:        Male  YOB: 1975  Age:           46 Years 6 Months        History & Examination:  46 year old man with anklylosing spondylitis treated with TNF inhibitors and prior low back surgery () who developed difficulty manipulating his right foot in mid 2021. NCS/EMG study performed on 21 revealed a severe left-sided deep peroneal neuropathy and a moderate right-sided deep peroneal neuropathy, but was unable to determine if the lesion was recent/acute or chronic. This study is performed to better characterize the peroneal neuropathies.     Techniques: Motor and sensory conduction studies were done with surface recording electrodes. EMG was done with a concentric needle electrode.      Results:  Nerve conduction studies:   1. Bilateral sural and superficial peroneal sensory responses are normal.   2. Left peroneal-EDB motor response is absent.   3. Right peroneal-EDB motor response shows moderately prolonged DL, moderately reduced amplitude and moderately slowed CV in the foreleg and across knee segments.   4. Bilateral tibial-AH motor responses are normal.      Needle EM. No abnormal spontaneous activity was appreciated in the sampled muscles.   2. Large amplitude and/or long duration motor unit potentials (MUP) were seen in the right TA and bilateral gastrocnemius muscles.   3. Rapidly firing MUPs with reduced recruitment were seen in the right TA muscle.     Interpretation:  This is an abnormal study. As was seen on the 21 study there is electrophysiologic evidence of  (1) a severe left-sided deep peroneal neuropathy and (2) a moderate right-sided deep peroneal neuropathy. Compared to the 21 study motor and sensory nerve conductions are unchanged and there has been no interval development of denervation  changes in peroneal innervated muscles. These observations suggests that the peroneal neuropathies are chronic/remote, and argue against an active/recent or evolving neuropathic disorder in the lower limbs. Clinical correlation is recommended.     Ramakrishna Badillo MD  Department of Neurology         Sensory NCS      Nerve / Sites Rec. Site Onset Peak NP Amp Ref. PP Amp Dist Patrick Ref. Temp     ms ms  V  V  V cm m/s m/s  C   L SURAL - Lat Mall      Calf Ankle 2.81 3.70 8.3 8.0 3.9 14 49.8 38.0 31.2   R SURAL - Lat Mall      Calf Ankle 3.02 3.91 8.4 8.0 10.2 14 46.3 38.0 30.7   R SUP PERONEAL      Lat Leg Rosario 3.28 4.90 2.3  2.7 12.5 38.1 38.0 31.2   L SUP PERONEAL      Lat Leg Rosario 3.18 4.69 3.3  2.6 12.5 39.3 38.0 31.2       Motor NCS      Nerve / Sites Rec. Site Lat Ref. Amp Ref. Rel Amp Dist Patrick Ref. Dur. Area Temp.     ms ms mV mV % cm m/s m/s ms %  C   L DEEP PERONEAL - EDB      Ankle EDB NR 6.00 NR 2.5 NR 8   NR NR 32.6   R DEEP PERONEAL - EDB      Ankle EDB 7.29 6.00 0.6 2.5 100 8   8.44 100 31.3      FibHead EDB 18.85  0.6  102 36 31.1 38.0 18.33 164 31.3      Pop Fos EDB 21.20  0.6  107 8 34.1 38.0 48.44 267 31.5   L TIBIAL - AH      Ankle AH 5.36 6.00 7.8 4.0 100 8   7.45 100 31.9   R TIBIAL - AH      Ankle AH 3.07 6.00 6.6 4.0 100 8   8.54 100 31.2       EMG Summary Table     Spontaneous MUAP Recruitment    IA Fib/PSW Fasc H.F. Amp Dur. PPP Pattern   R. TIB ANTERIOR N None None None 2+ N N MildReduced   R. GASTROCN (MED) N None None None N 1+ 2+ Normal   L. TIB ANTERIOR N None None None N N N Normal   L. GASTROCN (MED) N None None None 1+ N N Normal                            [facility]  Electrodiagnostic Laboratory    Nerve Conduction & EMG Report          Patient:       Joel Pineda  Patient ID:    [id number]  Gender:        Male  YOB: 1975  Age:           46 Years 6 Months        History & Examination:      Techniques: Motor and sensory conduction studies were done with surface  recording electrodes. EMG was done with a concentric needle electrode.        Results:      Interpretation:        EMG Physician:         Sensory NCS      Nerve / Sites Rec. Site Onset Peak NP Amp Ref. PP Amp Dist Patrick Ref. Temp     ms ms  V  V  V cm m/s m/s  C   L SURAL - Lat Mall      Calf Ankle 2.81 3.70 8.3 8.0 3.9 14 49.8 38.0 31.2   R SURAL - Lat Mall      Calf Ankle 3.02 3.91 8.4 8.0 10.2 14 46.3 38.0 30.7   R SUP PERONEAL      Lat Leg Rosario 3.28 4.90 2.3  2.7 12.5 38.1 38.0 31.2   L SUP PERONEAL      Lat Leg Rosario 3.18 4.69 3.3  2.6 12.5 39.3 38.0 31.2       Motor NCS      Nerve / Sites Rec. Site Lat Ref. Amp Ref. Rel Amp Dist Patrick Ref. Dur. Area Temp.     ms ms mV mV % cm m/s m/s ms %  C   L DEEP PERONEAL - EDB      Ankle EDB NR 6.00 NR 2.5 NR 8   NR NR 32.6   R DEEP PERONEAL - EDB      Ankle EDB 7.29 6.00 0.6 2.5 100 8   8.44 100 31.3      FibHead EDB 18.85  0.6  102 36 31.1 38.0 18.33 164 31.3      Pop Fos EDB 21.20  0.6  107 8 34.1 38.0 48.44 267 31.5   L TIBIAL - AH      Ankle AH 5.36 6.00 7.8 4.0 100 8   7.45 100 31.9   R TIBIAL - AH      Ankle AH 3.07 6.00 6.6 4.0 100 8   8.54 100 31.2       EMG Summary Table     Spontaneous MUAP Recruitment    IA Fib/PSW Fasc H.F. Amp Dur. PPP Pattern   R. TIB ANTERIOR N None None None 2+ N N Normal   R. GASTROCN (MED) N None None None N 1+ 2+ Normal   L. TIB ANTERIOR N None None None N N N Normal   L. GASTROCN (MED) N None None None 1+ N N Normal

## 2022-01-05 ENCOUNTER — HOSPITAL ENCOUNTER (OUTPATIENT)
Dept: BEHAVIORAL HEALTH | Facility: CLINIC | Age: 47
End: 2022-01-05
Attending: PSYCHIATRY & NEUROLOGY
Payer: MEDICARE

## 2022-01-05 PROCEDURE — 90853 GROUP PSYCHOTHERAPY: CPT | Mod: GT | Performed by: SOCIAL WORKER

## 2022-01-05 NOTE — GROUP NOTE
Process Group Note    PATIENT'S NAME: Joel Pineda  MRN:   [id number]  :   1975  ACCT. NUMBER: [id number]  DATE OF SERVICE: 22  START TIME:  1:00 PM  END TIME:  1:50 PM  FACILITATOR: Magali Dodson [facility]  TOPIC: MH Process Group    Diagnoses:  296.33 (F33.2) Major Depressive Disorder, Recurrent Episode, Severe _.   300.00 (F41.9) Unspecified Anxiety Disorder  301.83 (F60.3) Borderline Personality Disorder.      [facility] Mental Health Day Treatment  TRACK: [facility]    NUMBER OF PARTICIPANTS: 6                                      Service Modality:  Video Visit     Telemedicine Visit: The patient's condition can be safely assessed and treated via synchronous audio and visual telemedicine encounter.      Reason for Telemedicine Visit: Services only offered telehealth    Originating Site (Patient Location): Patient's home    Distant Site (Provider Location): Provider Remote Setting- Home Office    Consent:  The patient/guardian has verbally consented to: the potential risks and benefits of telemedicine (video visit) versus in person care; bill my insurance or make self-payment for services provided; and responsibility for payment of non-covered services.     Patient would like the video invitation sent by:  My Chart    Mode of Communication:  Video Conference via Medical Zoom    As the provider I attest to compliance with applicable laws and regulations related to telemedicine.         Client participated in educational discussion on relationship skills on trust building.      Data:    Session content: At the start of this group, patients were invited to check in by identifying themselves, describing their current emotional status, and identifying issues to address in this group.   Area(s) of treatment focus addressed in this session included Symptom Management, Personal Safety and Community Resources/Discharge Planning.  Tito reported setting a goal to start exercising at home using  "video coaching or attend a gym using his new insurance health benefit.   He stated that he feels hopeful about this goal.  He stated that his barriers are \"feeling blah\".  He reported low energy.  Client denied suicidal ideation, intent and plan. He gave feedback to peers.      Therapeutic Interventions/Treatment Strategies:  Psychotherapist offered support, feedback and validation and reinforced use of skills. Treatment modalities used include Cognitive Behavioral Therapy. Interventions include Cognitive Restructuring:  Assisted patient in formulating new neutral/positive alternatives to challenge less helpful / ineffective thoughts.    Assessment:    Patient response:   Patient responded to session by listening, focusing on goals and being attentive    Possible barriers to participation / learning include: and no barriers identified    Health Issues:   None reported       Substance Use Review:   Substance Use: No active concerns identified.    Mental Status/Behavioral Observations  Appearance:   Appropriate   Eye Contact:   Good   Psychomotor Behavior: Normal   Attitude:   Cooperative   Orientation:   All  Speech   Rate / Production: Normal    Volume:  Normal   Mood:    Anxious  Depressed   Affect:    Appropriate   Thought Content:   Clear  Thought Form:  Coherent  Logical     Insight:    Good     Plan:     Safety Plan: No current safety concerns identified.  Recommended that patient call 911 or go to the local ED should there be a change in any of these risk factors.     Barriers to treatment: None identified    Patient Contracts (see media tab):  None    Substance Use: Not addressed in session     Continue or Discharge: Patient will continue in Adult Day Treatment (ADT)  as planned. Patient is likely to benefit from learning and using skills as they work toward the goals identified in their treatment plan.      Magali Dodson, [facility]  January 5, 2022    "

## 2022-01-09 ENCOUNTER — MYC REFILL (OUTPATIENT)
Dept: FAMILY MEDICINE | Facility: CLINIC | Age: 47
End: 2022-01-09
Payer: MEDICARE

## 2022-01-09 DIAGNOSIS — M51.369 DDD (DEGENERATIVE DISC DISEASE), LUMBAR: ICD-10-CM

## 2022-01-09 DIAGNOSIS — M45.8 ANKYLOSING SPONDYLITIS OF SACRAL REGION (H): ICD-10-CM

## 2022-01-10 RX ORDER — OXYCODONE HYDROCHLORIDE 5 MG/1
5 TABLET ORAL EVERY 6 HOURS PRN
Qty: 35 TABLET | Refills: 0 | Status: SHIPPED | OUTPATIENT
Start: 2022-01-10 | End: 2022-01-28

## 2022-01-12 ENCOUNTER — HOSPITAL ENCOUNTER (OUTPATIENT)
Dept: BEHAVIORAL HEALTH | Facility: CLINIC | Age: 47
End: 2022-01-12
Attending: PSYCHIATRY & NEUROLOGY
Payer: MEDICARE

## 2022-01-12 PROCEDURE — 90853 GROUP PSYCHOTHERAPY: CPT | Mod: GT | Performed by: SOCIAL WORKER

## 2022-01-12 NOTE — GROUP NOTE
Process Group Note    PATIENT'S NAME: Joel Pineda  MRN:   [id number]  :   1975  ACCT. NUMBER: [id number]  DATE OF SERVICE: 22  START TIME:  1:00 PM  END TIME:  1:50 PM  FACILITATOR: Magali Dodson [facility]  TOPIC:  Process Group    Diagnoses:  296.33 (F33.2) Major Depressive Disorder, Recurrent Episode, Severe _.   300.00 (F41.9) Unspecified Anxiety Disorder  301.83 (F60.3) Borderline Personality Disorder.      [facility] Mental Health Day Treatment  TRACK: [facility]    NUMBER OF PARTICIPANTS: 5                                    Service Modality:  Video Visit     Telemedicine Visit: The patient's condition can be safely assessed and treated via synchronous audio and visual telemedicine encounter.      Reason for Telemedicine Visit: Services only offered telehealth    Originating Site (Patient Location): Patient's home    Distant Site (Provider Location): Provider Remote Setting- Home Office    Consent:  The patient/guardian has verbally consented to: the potential risks and benefits of telemedicine (video visit) versus in person care; bill my insurance or make self-payment for services provided; and responsibility for payment of non-covered services.     Patient would like the video invitation sent by:  My Chart    Mode of Communication:  Video Conference via Medical Zoom    As the provider I attest to compliance with applicable laws and regulations related to telemedicine.         Client  participated in discussion of using personal strengths as a coping skill.      Data:    Session content: At the start of this group, patients were invited to check in by identifying themselves, describing their current emotional status, and identifying issues to address in this group.   Area(s) of treatment focus addressed in this session included Symptom Management, Personal Safety and Community Resources/Discharge Planning.  Tito took time to report making a plan to handle having sweets in the  "house. He stated that he is continuing with the medication change.  He shared that he stopped a medication that contributed to GI issues.  Client denied suicidal ideation, intent and plan. He shared how he cared for his cat who had a medical concern and followed home care plans.  Mood seemed less depressed.  He continues to work on follow through and decreasing procrastination.    Therapeutic Interventions/Treatment Strategies:  Psychotherapist offered support, feedback and validation and reinforced use of skills. Treatment modalities used include Cognitive Behavioral Therapy. Interventions include Coping Skills: Discussed how the use of intentional \"in the moment\" actions can help reduce distress.    Assessment:    Patient response:   Patient responded to session by listening, focusing on goals and being attentive    Possible barriers to participation / learning include: and no barriers identified    Health Issues:   None reported       Substance Use Review:   Substance Use: No active concerns identified.    Mental Status/Behavioral Observations  Appearance:   Appropriate   Eye Contact:   Good   Psychomotor Behavior: Normal   Attitude:   Cooperative   Orientation:   All  Speech   Rate / Production: Normal    Volume:  Normal   Mood:    Anxious  Depressed   Affect:    Appropriate   Thought Content:   Clear  Thought Form:  Coherent  Logical     Insight:    Good     Plan:     Safety Plan: No current safety concerns identified.  Recommended that patient call 911 or go to the local ED should there be a change in any of these risk factors.     Barriers to treatment: None identified    Patient Contracts (see media tab):  None    Substance Use: Not addressed in session     Continue or Discharge: Patient will continue in Adult Day Treatment (ADT)  as planned. Patient is likely to benefit from learning and using skills as they work toward the goals identified in their treatment plan.      Magali Dodson, [facility] CenterSW January " 12, 2022

## 2022-01-13 DIAGNOSIS — H10.13 ALLERGIC CONJUNCTIVITIS OF BOTH EYES: ICD-10-CM

## 2022-01-13 RX ORDER — OLOPATADINE HYDROCHLORIDE 2 MG/ML
1 SOLUTION/ DROPS OPHTHALMIC DAILY
Qty: 2.5 ML | Refills: 3 | Status: SHIPPED | OUTPATIENT
Start: 2022-01-13 | End: 2022-01-19

## 2022-01-13 RX ORDER — OLOPATADINE HYDROCHLORIDE 2 MG/ML
1 SOLUTION/ DROPS OPHTHALMIC DAILY
Qty: 2.5 ML | Refills: 3 | Status: SHIPPED | OUTPATIENT
Start: 2022-01-13 | End: 2024-09-16

## 2022-01-13 NOTE — TELEPHONE ENCOUNTER
ANTICOAGULATION  MANAGEMENT-Home Monitor Managed by Exception    Reba AKI Calderon 84 year old female is on warfarin with therapeutic INR result. (Goal INR 2.0-3.0)    Recent labs: (last 7 days)     12/21/21  0000   INR 2.30         Previous INR was Therapeutic    Medication, diet, health changes since last INR:chart reviewed; none identified    Contacted within the last 12 weeks by phone on 12/13      BELGICA     Reba was NOT contacted regarding therapeutic result today per home monitoring policy manage by exception agreement.   Current warfarin dose is to be continued:     Summary  As of 12/21/2021    Full warfarin instructions:  3.75 mg every Mon, Wed, Fri; 2.5 mg all other days   Next INR check:  12/28/2021           ?   Rosa Maria Neil RN  Anticoagulation Clinic  12/21/2021    _______________________________________________________________________     Anticoagulation Episode Summary     Current INR goal:  2.0-3.0   TTR:  87.8 % (5 mo)   Target end date:  7/15/2036   Send INR reminders to:  EDWIN HODGE    Indications    Atrial fibrillation  permanent (H) [I48.21]           Comments:  [employer] home monitor. Managed by exception.         Anticoagulation Care Providers     Provider Role Specialty Phone number    Devon Ball MD Referring Internal Medicine [phone]           olopatadine (PATADAY) 0.2 % ophthalmic solution  Last Written Prescription Date:   9/3/2021  Last Fill Quantity: 2.5,   # refills: 3  Last Office Visit :  4/19/2021  Future Office visit:  4/22/2022     Audrey Ferreira MD                    Ophthalmology    ASSESSMENT and PLAN:  1. Allergic conjunctivitis of both eyes  - discussed chilled ATs for itching  - discussed cool compresses for itchiness  - olopatadine (PATADAY) 0.2 % ophthalmic solution; Place 1 drop into both eyes daily  Dispense: 2.5 mL; Refill: 3     2. Dry eye  - continue ATs prn -- can increase frequency     3. Myopia of both eyes  - doing well with WRx     4. Type 2 diabetes mellitus with complication, without long-term current use of insulin (H)  - diagnosed end of 2019  - not on insulin  - not checking BS at home per doctor recommendation; recently switched medications to glipizide and has had some symptomatic episodes of hypoglycemia; does not notice changes in vision with these episodes  - last A1c 6.5 in Feb 2021, increased from 6.0 in Oct 2020 and 6.1 in Aug 2020  - no retinopathy on exam; tortuous blood vessels  - encouraged BS/BP control  - annual DFE     5. Ankylosing spondylitis of sacral region (H)  - no active uveitis and no stigmata of prior intraocular inflammation  - discussed signs/symptoms of uveitis including eye pain, redness, blurred vision, new floaters, photophobia and he understands to call/come in should these develop  - previously intolerant of Humira (hives)  - currently on Enbrel        Follow up in 1 year or sooner PRN      2.5 mL, 3 Refills sent to lucas Sauceda RN  Central Triage Red Flags/Med Refills

## 2022-01-13 NOTE — TELEPHONE ENCOUNTER
[facility] Call Center    Phone Message    May a detailed message be left on voicemail: yes     Reason for Call: Medication Refill Request    Has the patient contacted the pharmacy for the refill? Yes   Name of medication being requested: olopatadine (PATADAY) 0.2 % ophthalmic solution     Provider who prescribed the medication: Dr. Ferreira  Pharmacy: [facility] in [place]  Date medication is needed: 1/13/22         Action Taken: Message routed to:  [facility] eye

## 2022-01-13 NOTE — TELEPHONE ENCOUNTER
Refill request for Olopatadine received from [facility] [place].  Last appointment 4/19/21. Farida Galvez RN

## 2022-01-17 ENCOUNTER — TRANSFERRED RECORDS (OUTPATIENT)
Dept: HEALTH INFORMATION MANAGEMENT | Facility: CLINIC | Age: 47
End: 2022-01-17
Payer: MEDICARE

## 2022-01-19 ENCOUNTER — VIRTUAL VISIT (OUTPATIENT)
Dept: PHARMACY | Facility: CLINIC | Age: 47
End: 2022-01-19
Payer: COMMERCIAL

## 2022-01-19 ENCOUNTER — HOSPITAL ENCOUNTER (OUTPATIENT)
Dept: BEHAVIORAL HEALTH | Facility: CLINIC | Age: 47
End: 2022-01-19
Attending: PSYCHIATRY & NEUROLOGY
Payer: MEDICARE

## 2022-01-19 DIAGNOSIS — Z78.9 TAKES DIETARY SUPPLEMENTS: ICD-10-CM

## 2022-01-19 DIAGNOSIS — K21.9 GASTROESOPHAGEAL REFLUX DISEASE WITHOUT ESOPHAGITIS: ICD-10-CM

## 2022-01-19 DIAGNOSIS — E78.1 HYPERTRIGLYCERIDEMIA: ICD-10-CM

## 2022-01-19 DIAGNOSIS — J30.2 SEASONAL ALLERGIC RHINITIS, UNSPECIFIED TRIGGER: ICD-10-CM

## 2022-01-19 DIAGNOSIS — F33.3 SEVERE EPISODE OF RECURRENT MAJOR DEPRESSIVE DISORDER, WITH PSYCHOTIC FEATURES (H): ICD-10-CM

## 2022-01-19 DIAGNOSIS — Z79.899 HIGH RISK MEDICATION USE: ICD-10-CM

## 2022-01-19 DIAGNOSIS — K31.84 GASTROPARESIS: ICD-10-CM

## 2022-01-19 DIAGNOSIS — J45.30 MILD PERSISTENT ASTHMA WITHOUT COMPLICATION: ICD-10-CM

## 2022-01-19 DIAGNOSIS — M45.8 ANKYLOSING SPONDYLITIS OF SACRAL REGION (H): ICD-10-CM

## 2022-01-19 DIAGNOSIS — F41.8 DEPRESSION WITH ANXIETY: ICD-10-CM

## 2022-01-19 DIAGNOSIS — R11.0 NAUSEA: ICD-10-CM

## 2022-01-19 DIAGNOSIS — I10 ESSENTIAL HYPERTENSION WITH GOAL BLOOD PRESSURE LESS THAN 140/90: ICD-10-CM

## 2022-01-19 DIAGNOSIS — E11.9 TYPE 2 DIABETES MELLITUS WITHOUT COMPLICATION, WITHOUT LONG-TERM CURRENT USE OF INSULIN (H): Primary | ICD-10-CM

## 2022-01-19 DIAGNOSIS — G89.4 CHRONIC PAIN SYNDROME: ICD-10-CM

## 2022-01-19 DIAGNOSIS — B02.9 HERPES ZOSTER WITHOUT COMPLICATION: ICD-10-CM

## 2022-01-19 DIAGNOSIS — G47.00 INSOMNIA, UNSPECIFIED TYPE: ICD-10-CM

## 2022-01-19 DIAGNOSIS — E03.9 ACQUIRED HYPOTHYROIDISM: ICD-10-CM

## 2022-01-19 PROCEDURE — 99605 MTMS BY PHARM NP 15 MIN: CPT | Performed by: PHARMACIST

## 2022-01-19 PROCEDURE — 99607 MTMS BY PHARM ADDL 15 MIN: CPT | Performed by: PHARMACIST

## 2022-01-19 PROCEDURE — 90853 GROUP PSYCHOTHERAPY: CPT | Mod: GT | Performed by: SOCIAL WORKER

## 2022-01-19 NOTE — PATIENT INSTRUCTIONS
Recommendations from today's MTM visit:                                                      Today we reviewed what your medicines are for, how to know if they are working, that your medicines are safe and how to make your medicine regimen as easy as possible.    ACT sent via [email or web address] refill sent.      Follow-up: 3 months    It was great to speak with you today.  I value your experience and would be very thankful for your time with providing feedback on our clinic survey. You may receive a survey via email or text message in the next few days.     To schedule another MTM appointment, please call the clinic directly or you may call the MTM scheduling line at [phone] or toll-free at [phone].     My Clinical Pharmacist's contact information:                                                      Please feel free to contact me with any questions or concerns you have.      Radha Mcdonald, Pharm.D, BCACP  Medication Therapy Management Pharmacist

## 2022-01-19 NOTE — LETTER
My Asthma Action Plan    Name: Joel Pineda   YOB: 1975  Date: 1/19/2022   My doctor: Radha Mcdonald [facility]   My clinic: [facility]        My Control Medicine: Fluticasone propionate + salmeterol (Advair Diskus or Wixela Inhub) -  250/50 mcg 1 puff twice daily  My Rescue Medicine: Albuterol (Proair/Ventolin/Proventil HFA) 2-4 puffs EVERY 4 HOURS as needed. Use a spacer if recommended by your provider.  My Oral Steroid Medicine: None My Asthma Severity:   Mild Persistent  Know your asthma triggers: cold air and polutants  cough and mask            GREEN ZONE   Good Control    I feel good    No cough or wheeze    Can work, sleep and play without asthma symptoms       Take your asthma control medicine every day.     1. If exercise triggers your asthma, take your rescue medication    15 minutes before exercise or sports, and    During exercise if you have asthma symptoms  2. Spacer to use with inhaler: If you have a spacer, make sure to use it with your inhaler             YELLOW ZONE Getting Worse  I have ANY of these:    I do not feel good    Cough or wheeze    Chest feels tight    Wake up at night   1. Keep taking your Green Zone medications  2. Start taking your rescue medicine:    every 20 minutes for up to 1 hour. Then every 4 hours for 24-48 hours.  3. If you stay in the Yellow Zone for more than 12-24 hours, contact your doctor.  4. If you do not return to the Green Zone in 12-24 hours or you get worse, start taking your oral steroid medicine if prescribed by your provider.           RED ZONE Medical Alert - Get Help  I have ANY of these:    I feel awful    Medicine is not helping    Breathing getting harder    Trouble walking or talking    Nose opens wide to breathe       1. Take your rescue medicine NOW  2. If your provider has prescribed an oral steroid medicine, start taking it NOW  3. Call your doctor NOW  4. If you are still in the Red Zone after 20 minutes  and you have not reached your doctor:    Take your rescue medicine again and    Call 911 or go to the emergency room right away    See your regular doctor within 2 weeks of an Emergency Room or Urgent Care visit for follow-up treatment.          Annual Reminders:  Meet with Asthma Educator,  Flu Shot in the Fall, consider Pneumonia Vaccination for patients with asthma (aged 19 and older).    Pharmacy:    [id number] - [place], [place] - [address] [id number] - [place], [place] - [address] [facility] - [place], [place] - [address]  WRITTEN PRESCRIPTION REQUESTED  [facility] - [place], [place] - [address] [place] [address] [id number] - [place], [place] - [address] [facility]  [address]  [facility] [place], [place] - [address]  [facility] - [facility] PATIENT ASSISTANCE PROGRAM  [facility] - [address]     Electronically signed by Radha Mcdonald [facility]   Date: 01/19/22                      Asthma Triggers  How To Control Things That Make Your Asthma Worse    Triggers are things that make your asthma worse.  Look at the list below to help you find your triggers and what you can do about them.  You can help prevent asthma flare-ups by staying away from your triggers.      Trigger                                                          What you can do   Cigarette Smoke  Tobacco smoke can make asthma worse. Do not allow smoking in your home, car or around you.  Be sure no one smokes at a child s day care or school.  If you smoke, ask your health care provider for ways to help you quit.  Ask family members to quit too.  Ask your health care provider for a referral to Quit Plan to help you quit smoking, or call 3-018-942-PLAN.     Colds, Flu, Bronchitis  These are common triggers of asthma.  Wash your hands often.  Don t touch your eyes, nose or mouth.  Get a flu shot every year.     Dust Mites  These are tiny bugs that live in cloth or carpet. They are too small to see. Wash sheets and blankets in hot water every week.   Encase pillows and mattress in dust mite proof covers.  Avoid having carpet if you can. If you have carpet, vacuum weekly.   Use a dust mask and HEPA vacuum.   Pollen and Outdoor Mold  Some people are allergic to trees, grass, or weed pollen, or molds. Try to keep your windows closed.  Limit time out doors when pollen count is high.   Ask you health care provider about taking medicine during allergy season.     Animal Dander  Some people are allergic to skin flakes, urine or saliva from pets with fur or feathers. Keep pets with fur or feathers out of your home.    If you can t keep the pet outdoors, then keep the pet out of your bedroom.  Keep the bedroom door closed.  Keep pets off cloth furniture and away from stuffed toys.     Mice, Rats, and Cockroaches   Some people are allergic to the waste from these pests.   Cover food and garbage.  Clean up spills and food crumbs.  Store grease in the refrigerator.   Keep food out of the bedroom.   Indoor Mold  This can be a trigger if your home has high moisture. Fix leaking faucets, pipes, or other sources of water.   Clean moldy surfaces.  Dehumidify basement if it is damp and smelly.   Smoke, Strong Odors, and Sprays  These can reduce air quality. Stay away from strong odors and sprays, such as perfume, powder, hair spray, paints, smoke incense, paint, cleaning products, candles and new carpet.   Exercise or Sports  Some people with asthma have this trigger. Be active!  Ask your doctor about taking medicine before sports or exercise to prevent symptoms.    Warm up for 5-10 minutes before and after sports or exercise.     Other Triggers of Asthma  Cold air:  Cover your nose and mouth with a scarf.  Sometimes laughing or crying can be a  trigger.  Some medicines and food can trigger asthma.

## 2022-01-19 NOTE — PROGRESS NOTES
Medication Therapy Management (MTM) Encounter    ASSESSMENT:                            Medication Adherence/Access: No issues identified    Mood/Anxiety/Insomnia: Stable, follows closely with psychiatry.    Pain: Stable. Refill sent for Narcan.     Diabetes:  Stable, Meeting A1c goal <7%.    Hypertension: Stable. Meeting blood pressure goal <140/90mmHg.    Asthma: Stable. Due for updated ACT. AAP updated.    Hyperlipidemia: Triglycerides are above goal. Fenofibrate added to regimen at that time. Due for updated lipids. Already ordered.    Allergic Rhinitis: Stable.    GERD/Nausea/Gastroparesis: Stable.    Hypothyroidism: Stable.    Supplements: Stable.    Ankylosing Spondylitis: Stable.    Herpes Zoster: Stable.    PLAN:                          ACT sent via [employer]  Narcan refill sent.    Follow-up: 3 months.    SUBJECTIVE/OBJECTIVE:                          Joel Pineda is a 46 year old male called for an initial visit for 2022. He was referred to me from Ksenia Lyles. Follow up from 11/10/2021.     Reason for visit: Medication Review.    Allergies/ADRs: Reviewed in chart  Past Medical History: Reviewed in chart  Tobacco: He reports that he has never smoked. He has never used smokeless tobacco.  Alcohol: none  Other Substance Use: None  Caffeine: 6 cups a day  Activity: None    Medication Adherence/Access: no issues reported  The patient fills medications at [facility]: NO, fills medications at [facility].     Mood/Anxiety/Insomnia: current therapy includes Latuda 60mg daily, duloxetine 120mg once daily, clonazepam 0.5mg twice daily (usually just taking at bedtime), Lunesta 3mg at bedtime,  melatonin 13mg at bedtime. Has been sleeping about 10 hours a night. Wakes up before his alarm at 830am.    Has a severe lack of motivation. He has been holding the marinol because of this. He has been talking about this in group.   Continues 2 hour a week group therapy with [facility]. Patient's psychiatrist is Dr. Rodriguez  "at [facility] in [place] and starting individual therapy as well with therapist at Dr. Rodriguez's office. Will be switching off of Latuda because of the cost of the medication.    Pain: current therapy includes APAP as needed-patient is taking  500 mg (1-2 tablets three times daily), lyrica 150mg two times daily, oxycodone 5-10mg every 6 hours as needed maximum of 6 tablets/day (usually takes 1-2 per day), methocarbamol 500mg-1000mg three times daily as needed (takes 3-6 per day),  nabumetone 1000 twice daily, Narcan nasal spray as needed. Narcan is expiring in March.    Diabetes:  Stopped glipizide (beginning of September)  due to episodes of hypoglycemia.   SMBG: rarely.    Recent symptoms of low blood sugar? none  Recent symptoms of high blood sugar? none  Eye exam: up to date  Foot exam:  Up to date  ACEi/ARB: Yes: Ramipril.   Urine Albumin:   Lab Results   Component Value Date    MICROL 8 11/10/2021    MICROL 8 10/14/2020     Lab Results   Component Value Date    A1C 6.0 08/19/2021    A1C 6.5 02/10/2021    A1C 6.0 10/16/2020    A1C 6.1 08/18/2020    A1C 6.0 01/24/2020    A1C 5.9 09/13/2019     Has been eating lower fiber meals due to gastroparesis.  Breakfast-Belvita crackers, yogurt, coffee, \"swig\" or orange juice,  Lunch- couple of packets of oatmeal, Dinner-[employer] prepared meals, prepared chili, Italian sausage & pasta  Aspirin: Taking 81mg daily and denies side effects    Hypertension: Current medications include ramipril 10mg daily, metoprolol XL 200mg twice daily. Patient does not self-monitor blood pressure.  Patient reports no current medication side effects.  BP Readings from Last 3 Encounters:   12/30/21 137/89   11/30/21 127/81   11/23/21 133/87     Asthma: Current medications: ICS/LABA- Advair 250-50 1 puff(s) twice daily  Montelukast (Singulair) once daily  Short-Acting Bronchodilator: Albuterol MDI. Patient rinses their mouth after using steroid inhaler. Triggers include: cold air " and pollutants.  Patient reports the following symptoms: increased need of albuterol.  Asthma Action Plan on file: NO  ACT Total Scores 5/18/2020 12/21/2020 6/9/2021   ACT TOTAL SCORE - - -   ASTHMA ER VISITS - - -   ASTHMA HOSPITALIZATIONS - - -   ACT TOTAL SCORE (Goal Greater than or Equal to 20) 20 20 21   In the past 12 months, how many times did you visit the emergency room for your asthma without being admitted to the hospital? 0 0 0   In the past 12 months, how many times were you hospitalized overnight because of your asthma? 0 0 0       Hyperlipidemia: Current therapy includes rosuvstatin 20mg daily and Fenofibrate 48mg once daily, Lovaza 2grams twice daily.  Patient reports no significant myalgias or other side effects.  The 10-year ASCVD risk score (Vanna PHIPPS Jr., et al., 2013) is: 10%    Values used to calculate the score:      Age: 46 years      Sex: Male      Is Non- : No      Diabetic: Yes      Tobacco smoker: No      Systolic Blood Pressure: 137 mmHg      Is BP treated: Yes      HDL Cholesterol: 27 mg/dL      Total Cholesterol: 160 mg/dL  Recent Labs   Lab Test 11/10/21  0930 10/16/20  0824 12/14/15  1016 12/03/14  0958   CHOL 160 155   < > 189   HDL 27* 35*   < > 27*   LDL 51 45   < > Cannot estimate LDL when triglyceride exceeds 400 mg/dL  108   TRIG 631* 373*   < > 487*   CHOLHDLRATIO  --   --   --  7.0*    < > = values in this interval not displayed.     Allergic Rhinitis: Current medications include cetirizine 10mg once daily, fluticasone nasal spray 1 spray(s) each nostril  once daily and ophthalmic drops - Pataday 1 drop into both eyes once daily. Primary symptoms are sneezing and itchy/watery eyes. Patient feels that current therapy is effective.     GERD/Nausea/Gastroparesis: Current medications include: Prilosec (omeprazole) 20mg daily and Pepcid (famotidine) 20mg on Friday mornings with Enbrel to prevent rashes, this happened prior with Humira and didn't want to take  any chances with the Enbrel.  Pt reports no current symptoms.  Patient feels that current regimen is effective. Mecoclopramide 5mg twice daily (has been taking 2.5mg twice daily), pyridostigmine 60mg five times daily (has only been taking a couple of times a day because his alarm is going off constatnly to take medications) ondansetron 8mg ODT every 8 hours as needed, marinol 5mg twice daily (uses over zofran, does not take in the afternoon because it causes increased appetite) If he does not take marinol in the morning he can't drink fluids.. Patient is having more stomach issues currently and has been using metoclopramide more frequently. Has tried off of omeprazole in the past and his symptoms returned.    Hypothyroidism: Patient is taking levothyroxine 75 mcg daily. Patient is having the following symptoms: hypothyroidism -  none and hyperthyroidism -  none.   TSH   Date Value Ref Range Status   02/10/2021 2.11 0.40 - 4.00 mU/L Final     Supplements: Currently taking   Vitamin B12 1000mcg monthly-last B12 levels normal  Vitamin D 1.25mg ([id number]) every 2 weeks last Vitamin D levels normal  Stress Tab daily  Zinc/Vitamin C daily   No reported issues at this time. .    Ankylosing Spondylitis: current medications include Enbrel 50mcg once a week. Takes a diphenhydramine and a puff of albuterol prior to taking the injection as preventative measures. When he has been off of this in the past for an infection he had more back pain.     Herpes Zoster: current therapy include valcyclovir 500mg daily. This has been helping to suppress symptoms.    Today's Vitals: There were no vitals taken for this visit.  ----------------    I spent 43 minutes with this patient today. All changes were made via collaborative practice agreement with Ksenia Lyles . A copy of the visit note was provided to the patient's provider(s).    The patient was sent via [employer] a summary of these recommendations.     Radha Mcdonald,  Pharm.D, BCACP  Medication Therapy Management Pharmacist    Telemedicine Visit Details  Type of service:  Telephone visit  Start Time: 11:33 AM  End Time: 12:16PM  Originating Location (patient location): Home  Distant Location (provider location):  [facility]     Medication Therapy Recommendations  No medication therapy recommendations to display

## 2022-01-19 NOTE — GROUP NOTE
Process Group Note    PATIENT'S NAME: Joel Pineda  MRN:   [id number]  :   1975  ACCT. NUMBER: [id number]  DATE OF SERVICE: 22  START TIME:  1:00 PM  END TIME:  1:50 PM  FACILITATOR: Magali Dodson [facility]  TOPIC:  Process Group    Diagnoses:  296.33 (F33.2) Major Depressive Disorder, Recurrent Episode, Severe _.   300.00 (F41.9) Unspecified Anxiety Disorder  301.83 (F60.3) Borderline Personality Disorder.      [facility] Mental Health Day Treatment  TRACK: [facility]    NUMBER OF PARTICIPANTS: 4                                      Service Modality:  Video Visit     Telemedicine Visit: The patient's condition can be safely assessed and treated via synchronous audio and visual telemedicine encounter.      Reason for Telemedicine Visit: Services only offered telehealth    Originating Site (Patient Location): Patient's home    Distant Site (Provider Location): Provider Remote Setting- Home Office    Consent:  The patient/guardian has verbally consented to: the potential risks and benefits of telemedicine (video visit) versus in person care; bill my insurance or make self-payment for services provided; and responsibility for payment of non-covered services.     Patient would like the video invitation sent by:  My Chart    Mode of Communication:  Video Conference via [employer]    As the provider I attest to compliance with applicable laws and regulations related to telemedicine.   Client engaged in educational discussion on  Decision making.      Data:    Session content: At the start of this group, patients were invited to check in by identifying themselves, describing their current emotional status, and identifying issues to address in this group.   Area(s) of treatment focus addressed in this session included Symptom Management, Personal Safety and Community Resources/Discharge Planning.  Tito reported calling his step Dad and finding him to be more supportive than usual.   They he  called his Dad who seemed minimizing of Tito's challenges with low motivation.  He stated that he is considering ways to talk to his Dad about his needs.   He is also considering asking his Dad and brother to check in with him regularly.  Client denied suicidal ideation, intent and plan.     Therapeutic Interventions/Treatment Strategies:  Psychotherapist offered support, feedback and validation and reinforced use of skills. Treatment modalities used include Cognitive Behavioral Therapy. Interventions include Cognitive Restructuring:  Facilitated recognition of the connection between negative thoughts and negative core beliefs.    Assessment:    Patient response:   Patient responded to session by focusing on goals, being attentive and accepting support    Possible barriers to participation / learning include: and no barriers identified    Health Issues:   None reported       Substance Use Review:   Substance Use: No active concerns identified.    Mental Status/Behavioral Observations  Appearance:   Appropriate   Eye Contact:   Good   Psychomotor Behavior: Normal   Attitude:   Cooperative   Orientation:   All  Speech   Rate / Production: Normal    Volume:  Normal   Mood:    Anxious  Depressed   Affect:    Appropriate   Thought Content:   Clear  Thought Form:  Coherent  Logical     Insight:    Good     Plan:     Safety Plan: No current safety concerns identified.  Recommended that patient call 911 or go to the local ED should there be a change in any of these risk factors.     Barriers to treatment: None identified    Patient Contracts (see media tab):  None    Substance Use: Not addressed in session     Continue or Discharge: Patient will continue in Adult Day Treatment (ADT)  as planned. Patient is likely to benefit from learning and using skills as they work toward the goals identified in their treatment plan.      Magali Dodson, [facility]  January 19, 2022

## 2022-01-24 NOTE — GROUP NOTE
Psychoeducation Group Note    PATIENT'S NAME: Joel Pineda  MRN:   [id number]  :   1975  ACCT. NUMBER: [id number]  DATE OF SERVICE: 21  START TIME:  9:00 AM  END TIME:  9:50 AM  FACILITATOR: Morenita Lau LPCC  TOPIC: MH RN Group: Mental Health Maintenance  [facility] Adult Mental Health Day Treatment  TRACK: 6A    NUMBER OF PARTICIPANTS: 5    Summary of Group / Topics Discussed:  Mental Health Maintenance:  Vulnerability: In this group, the concept of vulnerability was explored through the viewing, discussion, and self-reflection of the Len Womack Talk Titled,  The Power of Vulnerability.      Patient Session Goals / Objectives:  ? Defined and described definition of vulnerability   ? Identified 2 or more ways of practicing authenticity     Telemedicine Visit: The patient's condition can be safely assessed and treated via synchronous audio and visual telemedicine encounter.      Reason for Telemedicine Visit: Services only offered telehealth and due to COVID-19    Originating Site (Patient Location): Patient's home    Distant Site (Provider Location): Provider Remote Setting    Consent:  The patient/guardian has verbally consented to: the potential risks and benefits of telemedicine (video visit) versus in person care; bill my insurance or make self-payment for services provided; and responsibility for payment of non-covered services.     Mode of Communication:  Video Conference via [employer]    As the provider I attest to compliance with applicable laws and regulations related to telemedicine.        Patient Participation / Response:  Fully participated with the group by sharing personal reflections / insights and openly received / provided feedback with other participants.    Demonstrated understanding of topics discussed through group discussion and participation, Identified / Expressed personal readiness to practice skills and Verbalized understanding of mental health  maintenance topic    Treatment Plan:  Patient has a current master individualized treatment plan.  See Epic treatment plan for more information.    Morenita Lau, LASHELLC   Discussed treatment plan with patient at bedside. Discussed treatment plan with patient at bedside. I am a non participating [facility] physician seeing Pt in coverage for Dr Hylton Discussed treatment plan with patient at bedside. I am a non participating [facility] physician seeing Pt in coverage for Dr Hylton. The above patient documentation by Dr. Macias was reviewed and I agree with his evaluation, assessment and treatment plan.  Chriss Hylton M.D.

## 2022-01-26 ENCOUNTER — HOSPITAL ENCOUNTER (OUTPATIENT)
Dept: BEHAVIORAL HEALTH | Facility: CLINIC | Age: 47
End: 2022-01-26
Attending: PSYCHIATRY & NEUROLOGY
Payer: MEDICARE

## 2022-01-26 PROCEDURE — 90853 GROUP PSYCHOTHERAPY: CPT | Mod: GT | Performed by: SOCIAL WORKER

## 2022-01-26 NOTE — GROUP NOTE
Process Group Note    PATIENT'S NAME: Joel Pineda  MRN:   [id number]  :   1975  ACCT. NUMBER: [id number]  DATE OF SERVICE: 22  START TIME:  1:00 PM  END TIME:  1:50 PM  FACILITATOR: Magali Dodson [facility]  TOPIC:  Process Group    Diagnoses:  296.33 (F33.2) Major Depressive Disorder, Recurrent Episode, Severe _.   300.00 (F41.9) Unspecified Anxiety Disorder  301.83 (F60.3) Borderline Personality Disorder.      [facility] Mental Health Day Treatment  TRACK: [facility]    NUMBER OF PARTICIPANTS: 7                                      Service Modality:  Video Visit     Telemedicine Visit: The patient's condition can be safely assessed and treated via synchronous audio and visual telemedicine encounter.      Reason for Telemedicine Visit: Services only offered telehealth    Originating Site (Patient Location): Patient's home    Distant Site (Provider Location): Provider Remote Setting- Home Office    Consent:  The patient/guardian has verbally consented to: the potential risks and benefits of telemedicine (video visit) versus in person care; bill my insurance or make self-payment for services provided; and responsibility for payment of non-covered services.     Patient would like the video invitation sent by:  My Chart    Mode of Communication:  Video Conference via Medical [employer]oom    As the provider I attest to compliance with applicable laws and regulations related to telemedicine.         Client participated in education session on building social support network.      Data:    Session content: At the start of this group, patients were invited to check in by identifying themselves, describing their current emotional status, and identifying issues to address in this group.   Area(s) of treatment focus addressed in this session included Symptom Management, Personal Safety and Community Resources/Discharge Planning.  Tito reported updates to his medications.   He shared that the mood  "stabilizer Latuda is being tapered to discontinue it.   He stated that he also is considering what to do with a medication for stomach motility as he has started to notice facial movements and is concerned that it could be tardive dyskinesia.  He stated that he discussed pros/cons about discontinuing the medication with his provider.  Client denied suicidal ideation, intent and plan. Mood continues to be sad and depressed.   Anxiety seemed less today.  Client denied suicidal ideation, intent and plan.     Therapeutic Interventions/Treatment Strategies:  Psychotherapist offered support, feedback and validation and reinforced use of skills. Treatment modalities used include Cognitive Behavioral Therapy. Interventions include Cognitive Restructuring:  Assisted patient in formulating new neutral/positive alternatives to challenge less helpful / ineffective thoughts and Coping Skills: Assisted patient in identifying 1-2 healthy distraction skills to reduce overall distress and Discussed how the use of intentional \"in the moment\" actions can help reduce distress.    Assessment:    Patient response:   Patient responded to session by giving feedback, listening and focusing on goals    Possible barriers to participation / learning include: and no barriers identified    Health Issues:   None reported       Substance Use Review:   Substance Use: No active concerns identified.    Mental Status/Behavioral Observations  Appearance:   Appropriate   Eye Contact:   Good   Psychomotor Behavior: Normal   Attitude:   Cooperative   Orientation:   All  Speech   Rate / Production: Normal    Volume:  Normal   Mood:    Anxious  Depressed   Affect:    Appropriate   Thought Content:   Clear  Thought Form:  Coherent  Logical     Insight:    Good     Plan:     Safety Plan: No current safety concerns identified.  Recommended that patient call 911 or go to the local ED should there be a change in any of these risk factors.     Barriers to " treatment: None identified    Patient Contracts (see media tab):  None    Substance Use: Not addressed in session     Continue or Discharge: Patient will continue in Adult Day Treatment (ADT)  as planned. Patient is likely to benefit from learning and using skills as they work toward the goals identified in their treatment plan.      Magali Dodson, [facility]  January 26, 2022

## 2022-01-28 ENCOUNTER — MYC REFILL (OUTPATIENT)
Dept: FAMILY MEDICINE | Facility: CLINIC | Age: 47
End: 2022-01-28
Payer: MEDICARE

## 2022-01-28 DIAGNOSIS — M51.369 DDD (DEGENERATIVE DISC DISEASE), LUMBAR: ICD-10-CM

## 2022-01-28 DIAGNOSIS — M45.8 ANKYLOSING SPONDYLITIS OF SACRAL REGION (H): ICD-10-CM

## 2022-01-30 RX ORDER — OXYCODONE HYDROCHLORIDE 5 MG/1
5 TABLET ORAL EVERY 6 HOURS PRN
Qty: 35 TABLET | Refills: 0 | Status: SHIPPED | OUTPATIENT
Start: 2022-01-30 | End: 2022-02-14

## 2022-02-01 ENCOUNTER — E-VISIT (OUTPATIENT)
Dept: FAMILY MEDICINE | Facility: CLINIC | Age: 47
End: 2022-02-01
Payer: MEDICARE

## 2022-02-01 DIAGNOSIS — R09.A2 GLOBUS SENSATION: Primary | ICD-10-CM

## 2022-02-01 PROCEDURE — 99421 OL DIG E/M SVC 5-10 MIN: CPT | Performed by: FAMILY MEDICINE

## 2022-02-01 NOTE — ADDENDUM NOTE
Encounter addended by: Magali Dodson, [facility] on: 2/1/2022 3:34 PM   Actions taken: Flowsheet accepted

## 2022-02-02 ENCOUNTER — HOSPITAL ENCOUNTER (OUTPATIENT)
Dept: BEHAVIORAL HEALTH | Facility: CLINIC | Age: 47
End: 2022-02-02
Attending: PSYCHIATRY & NEUROLOGY
Payer: MEDICARE

## 2022-02-02 PROCEDURE — 90853 GROUP PSYCHOTHERAPY: CPT | Mod: HQ,PO | Performed by: SOCIAL WORKER

## 2022-02-02 PROCEDURE — 90853 GROUP PSYCHOTHERAPY: CPT | Mod: GT | Performed by: SOCIAL WORKER

## 2022-02-02 NOTE — GROUP NOTE
Process Group Note    PATIENT'S NAME: Joel Pineda  MRN:   [id number]  :   1975  ACCT. NUMBER: [id number]  DATE OF SERVICE: 22  START TIME:  1:00 PM  END TIME:  1:50 PM  FACILITATOR: Magali Dodson [facility]  TOPIC:  Process Group    Diagnoses:  296.33 (F33.2) Major Depressive Disorder, Recurrent Episode, Severe _.   300.00 (F41.9) Unspecified Anxiety Disorder  301.83 (F60.3) Borderline Personality Disorder.      [facility] Adult Mental Health Day Treatment  TRACK: [facility]    NUMBER OF PARTICIPANTS: 7                                      Service Modality:  Video Visit     Telemedicine Visit: The patient's condition can be safely assessed and treated via synchronous audio and visual telemedicine encounter.      Reason for Telemedicine Visit: Services only offered telehealth    Originating Site (Patient Location): Patient's home    Distant Site (Provider Location): Provider Remote Setting- Home Office    Consent:  The patient/guardian has verbally consented to: the potential risks and benefits of telemedicine (video visit) versus in person care; bill my insurance or make self-payment for services provided; and responsibility for payment of non-covered services.     Patient would like the video invitation sent by:  My Chart    Mode of Communication:  Video Conference via Medical Zoom    As the provider I attest to compliance with applicable laws and regulations related to telemedicine.      Client Participated in EBP group with a topic on dealing with mental health stigma.    Number of participants: 7  Time:  1400 to 1450      Data:    Session content: At the start of this group, patients were invited to check in by identifying themselves, describing their current emotional status, and identifying issues to address in this group.   Area(s) of treatment focus addressed in this session included Symptom Management, Personal Safety and Community Resources/Discharge Planning.  Tito took time to  "report feeling relieved today.  He stated that he reached out to his psychiatry office about experiencing auditory hallucinations.  He shared that they think this is a side effect of a medication taper and asked him to use a medication he had on hand.  He reported relief from this medication.   He stated that he will see the psychiatry provider in one week.  He is switching from Latuda to Risperdone.  Client denied suicidal ideation, intent and plan.  Energy level and task completion continue to be variable.      Therapeutic Interventions/Treatment Strategies:  Psychotherapist offered support, feedback and validation and reinforced use of skills. Treatment modalities used include Cognitive Behavioral Therapy. Interventions include Cognitive Restructuring:  Assisted patient in identifying new neutral/positive core beliefs and Coping Skills: Assisted patient in identifying 1-2 healthy distraction skills to reduce overall distress and Discussed how the use of intentional \"in the moment\" actions can help reduce distress.    Assessment:    Patient response:   Patient responded to session by listening, focusing on goals and being attentive    Possible barriers to participation / learning include: and no barriers identified    Health Issues:   None reported       Substance Use Review:   Substance Use: No active concerns identified.    Mental Status/Behavioral Observations  Appearance:   Appropriate   Eye Contact:   Good   Psychomotor Behavior: Normal   Attitude:   Cooperative   Orientation:   All  Speech   Rate / Production: Normal    Volume:  Normal   Mood:    Anxious  Depressed   Affect:    Appropriate   Thought Content:   Clear  Thought Form:  Coherent  Logical     Insight:    Good     Plan:     Safety Plan: No current safety concerns identified.  Recommended that patient call 911 or go to the local ED should there be a change in any of these risk factors.     Barriers to treatment: None identified    Patient Contracts " (see media tab):  None    Substance Use: Not addressed in session     Continue or Discharge: Patient will continue in Adult Day Treatment (ADT)  as planned. Patient is likely to benefit from learning and using skills as they work toward the goals identified in their treatment plan.      Magali Dodson, [facility]  February 2, 2022

## 2022-02-08 DIAGNOSIS — M45.8 ANKYLOSING SPONDYLITIS OF SACRAL REGION (H): ICD-10-CM

## 2022-02-08 DIAGNOSIS — M51.369 DDD (DEGENERATIVE DISC DISEASE), LUMBAR: ICD-10-CM

## 2022-02-08 RX ORDER — NABUMETONE 500 MG/1
TABLET, FILM COATED ORAL
Qty: 120 TABLET | Refills: 0 | Status: SHIPPED | OUTPATIENT
Start: 2022-02-08 | End: 2022-03-08

## 2022-02-09 ENCOUNTER — HOSPITAL ENCOUNTER (OUTPATIENT)
Dept: BEHAVIORAL HEALTH | Facility: CLINIC | Age: 47
End: 2022-02-09
Attending: PSYCHIATRY & NEUROLOGY
Payer: MEDICARE

## 2022-02-09 PROCEDURE — 90853 GROUP PSYCHOTHERAPY: CPT | Mod: GT | Performed by: SOCIAL WORKER

## 2022-02-09 PROCEDURE — 90853 GROUP PSYCHOTHERAPY: CPT | Mod: HQ,PO | Performed by: SOCIAL WORKER

## 2022-02-09 NOTE — GROUP NOTE
Process Group Note    PATIENT'S NAME: Joel Pineda  MRN:   [id number]  :   1975  ACCT. NUMBER: [id number]  DATE OF SERVICE: 22  START TIME:  1:00 PM  END TIME:  2:50 PM  FACILITATOR: Magali Dodson [facility]  TOPIC:  Process Group    Diagnoses:  296.33 (F33.2) Major Depressive Disorder, Recurrent Episode, Severe _.   300.00 (F41.9) Unspecified Anxiety Disorder  301.83 (F60.3) Borderline Personality Disorder.      [facility]  [facility]: [facility]    NUMBER OF PARTICIPANTS: 7                                    Service Modality:  Video Visit     Telemedicine Visit: The patient's condition can be safely assessed and treated via synchronous audio and visual telemedicine encounter.      Reason for Telemedicine Visit: Services only offered telehealth    Originating Site (Patient Location): Patient's home    Distant Site (Provider Location): Provider Remote Setting- Home Office    Consent:  The patient/guardian has verbally consented to: the potential risks and benefits of telemedicine (video visit) versus in person care; bill my insurance or make self-payment for services provided; and responsibility for payment of non-covered services.     Patient would like the video invitation sent by:  My Chart    Mode of Communication:  Video Conference via Medical Zoom    As the provider I attest to compliance with applicable laws and regulations related to telemedicine.   Client participated in educational session on identifying, setting, and maintaining personal boundaries.              Data:    Session content: At the start of this group, patients were invited to check in by identifying themselves, describing their current emotional status, and identifying issues to address in this group.   Area(s) of treatment focus addressed in this session included Symptom Management, Personal Safety and Community Resources/Discharge Planning.  Daria took time to report variable depressed and  anxious mod.   Client denied suicidal ideation, intent and plan. Ttio shared that he was dealing with a recurrent medical issue.  He shared that it sometimes improved with treatment and sometimes needed a higher level of treatment.  He shared that he was enjoying spending time with his cats.    Therapeutic Interventions/Treatment Strategies:  Psychotherapist offered support, feedback and validation and reinforced use of skills. Treatment modalities used include Cognitive Behavioral Therapy. Interventions include Cognitive Restructuring:  Assisted patient in formulating new neutral/positive alternatives to challenge less helpful / ineffective thoughts.    Assessment:    Patient response:   Patient responded to session by focusing on goals    Possible barriers to participation / learning include: and no barriers identified    Health Issues:   None reported       Substance Use Review:   Substance Use: No active concerns identified.    Mental Status/Behavioral Observations  Appearance:   Appropriate   Eye Contact:   Good   Psychomotor Behavior: Normal   Attitude:   Cooperative   Orientation:   All  Speech   Rate / Production: Normal    Volume:  Normal   Mood:    Anxious  Depressed   Affect:    Appropriate   Thought Content:   Clear  Thought Form:  Coherent  Logical     Insight:    Good     Plan:     Safety Plan: No current safety concerns identified.  Recommended that patient call 911 or go to the local ED should there be a change in any of these risk factors.     Barriers to treatment: None identified    Patient Contracts (see media tab):  None    Substance Use: Not addressed in session     Continue or Discharge: Patient will continue in Adult Day Treatment (ADT)  as planned. Patient is likely to benefit from learning and using skills as they work toward the goals identified in their treatment plan.      Magali Dodson, [facility]  February 9, 2022

## 2022-02-10 ENCOUNTER — LAB (OUTPATIENT)
Dept: LAB | Facility: CLINIC | Age: 47
End: 2022-02-10
Payer: MEDICARE

## 2022-02-10 ENCOUNTER — MYC MEDICAL ADVICE (OUTPATIENT)
Dept: FAMILY MEDICINE | Facility: CLINIC | Age: 47
End: 2022-02-10

## 2022-02-10 DIAGNOSIS — E78.5 HYPERLIPIDEMIA LDL GOAL <100: ICD-10-CM

## 2022-02-10 DIAGNOSIS — G43.109 MIGRAINE WITH AURA AND WITHOUT STATUS MIGRAINOSUS, NOT INTRACTABLE: ICD-10-CM

## 2022-02-10 DIAGNOSIS — E53.8 VITAMIN B12 DEFICIENCY (NON ANEMIC): ICD-10-CM

## 2022-02-10 DIAGNOSIS — E55.9 VITAMIN D DEFICIENCY: ICD-10-CM

## 2022-02-10 DIAGNOSIS — E78.1 HYPERTRIGLYCERIDEMIA: ICD-10-CM

## 2022-02-10 DIAGNOSIS — E11.9 TYPE 2 DIABETES MELLITUS WITHOUT COMPLICATION, WITHOUT LONG-TERM CURRENT USE OF INSULIN (H): ICD-10-CM

## 2022-02-10 DIAGNOSIS — E11.8 TYPE 2 DIABETES MELLITUS WITH COMPLICATION, WITHOUT LONG-TERM CURRENT USE OF INSULIN (H): ICD-10-CM

## 2022-02-10 DIAGNOSIS — E78.1 HYPERTRIGLYCERIDEMIA: Primary | ICD-10-CM

## 2022-02-10 LAB
CHOLEST SERPL-MCNC: 188 MG/DL
FASTING STATUS PATIENT QL REPORTED: YES
HBA1C MFR BLD: 6.3 % (ref 0–5.6)
HDLC SERPL-MCNC: 29 MG/DL
LDLC SERPL CALC-MCNC: 88 MG/DL
LDLC SERPL CALC-MCNC: ABNORMAL MG/DL
NONHDLC SERPL-MCNC: 159 MG/DL
TRIGL SERPL-MCNC: 545 MG/DL
TSH SERPL DL<=0.005 MIU/L-ACNC: 2.59 MU/L (ref 0.4–4)
VIT B12 SERPL-MCNC: 674 PG/ML (ref 193–986)

## 2022-02-10 PROCEDURE — 36415 COLL VENOUS BLD VENIPUNCTURE: CPT

## 2022-02-10 PROCEDURE — 82607 VITAMIN B-12: CPT

## 2022-02-10 PROCEDURE — 83721 ASSAY OF BLOOD LIPOPROTEIN: CPT | Mod: 59

## 2022-02-10 PROCEDURE — 80048 BASIC METABOLIC PNL TOTAL CA: CPT

## 2022-02-10 PROCEDURE — 84443 ASSAY THYROID STIM HORMONE: CPT

## 2022-02-10 PROCEDURE — 80061 LIPID PANEL: CPT

## 2022-02-10 PROCEDURE — 82306 VITAMIN D 25 HYDROXY: CPT

## 2022-02-10 PROCEDURE — 83036 HEMOGLOBIN GLYCOSYLATED A1C: CPT

## 2022-02-10 NOTE — RESULT ENCOUNTER NOTE
Tito,  - your thyroid test was normal.   - your vitamin B12 level was normal.   - the cholesterol panel continues to show the triglycerides are still quite high. Have you been taking the fenofibrate faithfully? If yes, we should increase the dose of this med provided you are tolerating it. Please message Dr. Lyles with and update of what you have been taking.   - the a1c diabetes test shows good overall control  - The vitamin D test is still processing. I'll update you in a separate message when this result returns.    Please MyChart or call if you have any concerns or questions.   Sincerely,  Lenore Fox MD  (for Dr. Lyles who is out of the office today)

## 2022-02-11 ENCOUNTER — MYC MEDICAL ADVICE (OUTPATIENT)
Dept: DERMATOLOGY | Facility: CLINIC | Age: 47
End: 2022-02-11
Payer: MEDICARE

## 2022-02-11 LAB
ANION GAP SERPL CALCULATED.3IONS-SCNC: 7 MMOL/L (ref 3–14)
BUN SERPL-MCNC: 12 MG/DL (ref 7–30)
CALCIUM SERPL-MCNC: 9.7 MG/DL (ref 8.5–10.1)
CHLORIDE BLD-SCNC: 106 MMOL/L (ref 94–109)
CO2 SERPL-SCNC: 24 MMOL/L (ref 20–32)
CREAT SERPL-MCNC: 0.95 MG/DL (ref 0.66–1.25)
DEPRECATED CALCIDIOL+CALCIFEROL SERPL-MC: 46 UG/L (ref 20–75)
GFR SERPL CREATININE-BSD FRML MDRD: >90 ML/MIN/1.73M2
GLUCOSE BLD-MCNC: 113 MG/DL (ref 70–99)
POTASSIUM BLD-SCNC: 4.3 MMOL/L (ref 3.4–5.3)
SODIUM SERPL-SCNC: 137 MMOL/L (ref 133–144)

## 2022-02-11 RX ORDER — ROSUVASTATIN CALCIUM 40 MG/1
40 TABLET, COATED ORAL DAILY
Qty: 90 TABLET | Refills: 0 | Status: SHIPPED | OUTPATIENT
Start: 2022-02-11 | End: 2022-05-03

## 2022-02-11 RX ORDER — RIZATRIPTAN BENZOATE 5 MG/1
TABLET, ORALLY DISINTEGRATING ORAL
Qty: 30 TABLET | Refills: 0 | Status: SHIPPED | OUTPATIENT
Start: 2022-02-11 | End: 2022-08-02

## 2022-02-11 NOTE — TELEPHONE ENCOUNTER
Patient went Saturday and they did an I&D and gave him a topical. Patient states that the area has drained again since then and is now feeling a bit better. Reviewed signs of infection including increasing pain, increasing redness, discharge such as pus, fevers, chills or if not feeling well. No immediate concern for infection.    Sandy Menjivar, LUDA on 2/11/2022 at 2:43 PM

## 2022-02-11 NOTE — TELEPHONE ENCOUNTER
Routing patient message to provider to please review and advise.     Homa Tipton RN, BSN  [facility]

## 2022-02-11 NOTE — TELEPHONE ENCOUNTER
Patient needs to go to urgent care or ER to have evaluated. Please call and notify and add on within 3 weeks for visit with me so we can do follow up

## 2022-02-14 ENCOUNTER — MYC REFILL (OUTPATIENT)
Dept: FAMILY MEDICINE | Facility: CLINIC | Age: 47
End: 2022-02-14

## 2022-02-14 DIAGNOSIS — M45.8 ANKYLOSING SPONDYLITIS OF SACRAL REGION (H): ICD-10-CM

## 2022-02-14 DIAGNOSIS — F11.90 CHRONIC, CONTINUOUS USE OF OPIOIDS: ICD-10-CM

## 2022-02-14 DIAGNOSIS — M51.369 DDD (DEGENERATIVE DISC DISEASE), LUMBAR: ICD-10-CM

## 2022-02-15 ENCOUNTER — OFFICE VISIT (OUTPATIENT)
Dept: NEUROSURGERY | Facility: CLINIC | Age: 47
End: 2022-02-15
Payer: MEDICARE

## 2022-02-15 ENCOUNTER — TELEPHONE (OUTPATIENT)
Dept: NEUROSURGERY | Facility: CLINIC | Age: 47
End: 2022-02-15

## 2022-02-15 ENCOUNTER — E-VISIT (OUTPATIENT)
Dept: FAMILY MEDICINE | Facility: CLINIC | Age: 47
End: 2022-02-15

## 2022-02-15 VITALS
DIASTOLIC BLOOD PRESSURE: 84 MMHG | SYSTOLIC BLOOD PRESSURE: 126 MMHG | RESPIRATION RATE: 16 BRPM | OXYGEN SATURATION: 98 % | HEART RATE: 69 BPM

## 2022-02-15 DIAGNOSIS — M54.16 LUMBAR BACK PAIN WITH RADICULOPATHY AFFECTING LEFT LOWER EXTREMITY: Primary | ICD-10-CM

## 2022-02-15 DIAGNOSIS — G62.9 PERIPHERAL POLYNEUROPATHY: Primary | ICD-10-CM

## 2022-02-15 PROCEDURE — 99213 OFFICE O/P EST LOW 20 MIN: CPT | Performed by: NEUROLOGICAL SURGERY

## 2022-02-15 PROCEDURE — 99421 OL DIG E/M SVC 5-10 MIN: CPT | Performed by: FAMILY MEDICINE

## 2022-02-15 RX ORDER — RISPERIDONE 0.5 MG/1
0.5 TABLET ORAL 2 TIMES DAILY PRN
COMMUNITY
Start: 2022-02-14

## 2022-02-15 RX ORDER — OXYCODONE HYDROCHLORIDE 5 MG/1
5 TABLET ORAL EVERY 6 HOURS PRN
Qty: 35 TABLET | Refills: 0 | Status: SHIPPED | OUTPATIENT
Start: 2022-02-15 | End: 2022-03-05

## 2022-02-15 RX ORDER — PREDNISONE 20 MG/1
40 TABLET ORAL DAILY
Qty: 10 TABLET | Refills: 0 | Status: SHIPPED | OUTPATIENT
Start: 2022-02-15 | End: 2022-02-20

## 2022-02-15 NOTE — LETTER
2/15/2022       RE: Joel Pineda  [address] [place] [id number]     Dear Colleague,    Thank you for referring your patient, Joel Pineda, to the [facility]OS[facility] [place] at [facility]. Please see a copy of my visit note below.    Service Date: 02/15/2022    Ksenia Lyles MD  [address], [address]    RE:       Joel Pineda  MRN:   [id number]    :    1975        Dear Dr. Lyles:      I had the opportunity to see Mr. Pineda again today in my clinic.  He is a patient who I have followed since November.  He has a history of bilateral leg pain, particularly an L5 radiculopathy on the left side and low back pain.  This has been ongoing for many years.  He previously has had surgery by Dr. Mcdermott.  He just recently underwent a rhizolysis by Dr. Diaz.  As of this week though, his pain has increased.  He has increased his Lyrica.  Unfortunately, he has not been able to lose much weight in the last 3 months.  He has had a history of epidural steroid injection, but this was back in .  It had helped him tremendously at first, but the second one did not.  For that reason, he has not had any in the last 14 years.      His last EMG shows that he has diabetic neuropathy involving both of his legs.  I discussed the results with him and also his overall, picture in terms of his low back, and informed him that he has not exhausted all of his conservative management yet given that he has still to retry the epidural steroid injections.  He also has not had much success with the weight loss and this may be 1 avenue that can help him also in terms of his low back.  I do not think that surgery is going to help his condition as at the current time, he has very little compression without a mechanical component to improve.  It is best if he is to be approached conservatively  with the pain management team.  However, I will see him in 6 months' time.  We could reevaluate him at that point.  I have warned him of the things to be careful of, especially with loss of strength, at which point we will have to reimage him.      Nixon Gallagher MD      cc:  Elaine Diaz MD  [facility]  [address] [place] [address], [id number]        D: 02/15/2022   T: 02/15/2022   MT: pat    Name:     WANG LEELopez  MRN:      [id number]        Account:      [id number]   :      1975           Service Date: 02/15/2022     Document: [id number]

## 2022-02-15 NOTE — PATIENT INSTRUCTIONS
Thank you for choosing [facility]. You were seen in the Neurosurgery Clinic today with Dr. Gallagher.    Next steps:  1. Continue care with Pain Clinic.  2. Follow up with neurosurgery as needed.    Please don't hesitate to reach out via MyChart or telephone if you have any questions after your visit.    Lenore Hatch RN Care Coordinator, Neurosurgery    Direct: [phone]  Neurosurgery Clinic: [phone]

## 2022-02-15 NOTE — PROGRESS NOTES
Service Date: 02/15/2022    Ksenia Lyles MD  [address], [address]    RE:       Joel Pineda  MRN:   [id number]    :    1975        Dear Dr. Lyles:      I had the opportunity to see Mr. Pineda again today in my clinic.  He is a patient who I have followed since November.  He has a history of bilateral leg pain, particularly an L5 radiculopathy on the left side and low back pain.  This has been ongoing for many years.  He previously has had surgery by Dr. Mcdermott.  He just recently underwent a rhizolysis by Dr. Diaz.  As of this week though, his pain has increased.  He has increased his Lyrica.  Unfortunately, he has not been able to lose much weight in the last 3 months.  He has had a history of epidural steroid injection, but this was back in .  It had helped him tremendously at first, but the second one did not.  For that reason, he has not had any in the last 14 years.      His last EMG shows that he has diabetic neuropathy involving both of his legs.  I discussed the results with him and also his overall, picture in terms of his low back, and informed him that he has not exhausted all of his conservative management yet given that he has still to retry the epidural steroid injections.  He also has not had much success with the weight loss and this may be 1 avenue that can help him also in terms of his low back.  I do not think that surgery is going to help his condition as at the current time, he has very little compression without a mechanical component to improve.  It is best if he is to be approached conservatively with the pain management team.  However, I will see him in 6 months' time.  We could reevaluate him at that point.  I have warned him of the things to be careful of, especially with loss of strength, at which point we will have to reimage him.      Sincerely,         Nixon Gallagher MD      cc:  Elaine Diaz,  MD  [facility]  [address] [place] [address], [id number]    Nixon Gallagher MD        D: 02/15/2022   T: 02/15/2022   MT: pat    Name:     WANG LEE  MRN:      [id number]        Account:      [id number]   :      1975           Service Date: 02/15/2022       Document: [id number]

## 2022-02-15 NOTE — TELEPHONE ENCOUNTER
Prescription refilled 1/31/22.  #35.  Chronic pain plan - 40 pills per month.    Will need to wait for PCP review for refill.      Felipa Renteria MD

## 2022-02-16 ENCOUNTER — HOSPITAL ENCOUNTER (OUTPATIENT)
Dept: BEHAVIORAL HEALTH | Facility: CLINIC | Age: 47
End: 2022-02-16
Attending: PSYCHIATRY & NEUROLOGY
Payer: MEDICARE

## 2022-02-16 PROCEDURE — 90853 GROUP PSYCHOTHERAPY: CPT | Mod: HQ,PO | Performed by: SOCIAL WORKER

## 2022-02-16 PROCEDURE — 90853 GROUP PSYCHOTHERAPY: CPT | Mod: PO | Performed by: SOCIAL WORKER

## 2022-02-16 NOTE — PATIENT INSTRUCTIONS
Thank you for choosing us for your care. Given your symptoms, I would like you to do a lab-only visit to determine what is causing them.  I have placed the orders.  Please schedule an appointment with the lab right here in [place], or call [phone].  I will let you know when the results are back and next steps to take.

## 2022-02-16 NOTE — GROUP NOTE
Process Group Note    PATIENT'S NAME: Joel Pineda  MRN:   [id number]  :   1975  ACCT. NUMBER: [id number]  DATE OF SERVICE: 22  START TIME:  1:00 PM  END TIME:  1:50 PM  FACILITATOR: Magali Dodson [facility]  TOPIC:  Process Group    Diagnoses:  296.33 (F33.2) Major Depressive Disorder, Recurrent Episode, Severe _.   300.00 (F41.9) Unspecified Anxiety Disorder  301.83 (F60.3) Borderline Personality Disorder.      [facility] Day [facility]  TRACK: [facility]    NUMBER OF PARTICIPANTS: 5                                    Service Modality:  Video Visit     Telemedicine Visit: The patient's condition can be safely assessed and treated via synchronous audio and visual telemedicine encounter.      Reason for Telemedicine Visit: Services only offered telehealth    Originating Site (Patient Location): Patient's home    Distant Site (Provider Location): Provider Remote Setting- Home Office    Consent:  The patient/guardian has verbally consented to: the potential risks and benefits of telemedicine (video visit) versus in person care; bill my insurance or make self-payment for services provided; and responsibility for payment of non-covered services.     Patient would like the video invitation sent by:  My Chart    Mode of Communication:  Video Conference via Medical Zoom    As the provider I attest to compliance with applicable laws and regulations related to telemedicine.   Client participated in educational session on identifying values.   Client identified multiple personal values and shared them with the group.   Client then applied some of the values and identified three ways to act on the values in terms of increasing personal meaning and purpose.              Data:    Session content: At the start of this group, patients were invited to check in by identifying themselves, describing their current emotional status, and identifying issues to address in this group.   Area(s) of  treatment focus addressed in this session included Symptom Management, Personal Safety and Community Resources/Discharge Planning.  Tito took time to report higher pain level in his back.   He stated that he had a e-visit with a doctor to discuss medications for the pain flare up.   He will  the medication and get groceries later today.  He share a funny story about being on the medical marijuana program.  Client denied suicidal ideation, intent and plan.     Therapeutic Interventions/Treatment Strategies:  Psychotherapist offered support, feedback and validation and reinforced use of skills. Treatment modalities used include Cognitive Behavioral Therapy. Interventions include Cognitive Restructuring:  Assisted patient in formulating new neutral/positive alternatives to challenge less helpful / ineffective thoughts.    Assessment:    Patient response:   Patient responded to session by listening, focusing on goals and being attentive    Possible barriers to participation / learning include: and no barriers identified    Health Issues:   Yes: Pain, Associated Psychological Distress       Substance Use Review:   Substance Use: No active concerns identified.    Mental Status/Behavioral Observations  Appearance:   Appropriate   Eye Contact:   Good   Psychomotor Behavior: Normal   Attitude:   Cooperative   Orientation:   All  Speech   Rate / Production: Normal    Volume:  Normal   Mood:    Anxious  Depressed   Affect:    Appropriate   Thought Content:   Clear  Thought Form:  Coherent  Logical     Insight:    Good     Plan:     Safety Plan: No current safety concerns identified.  Recommended that patient call 911 or go to the local ED should there be a change in any of these risk factors.     Barriers to treatment: None identified    Patient Contracts (see media tab):  None    Substance Use: Not addressed in session     Continue or Discharge: Patient will continue in Adult Day Treatment (ADT)  as planned. Patient  is likely to benefit from learning and using skills as they work toward the goals identified in their treatment plan.      Magali Dodson, [facility]  February 16, 2022

## 2022-02-22 ENCOUNTER — OFFICE VISIT (OUTPATIENT)
Dept: DERMATOLOGY | Facility: CLINIC | Age: 47
End: 2022-02-22
Payer: MEDICARE

## 2022-02-22 DIAGNOSIS — L73.2 HIDRADENITIS SUPPURATIVA: Primary | ICD-10-CM

## 2022-02-22 DIAGNOSIS — L70.0 ACNE VULGARIS: ICD-10-CM

## 2022-02-22 PROCEDURE — 99214 OFFICE O/P EST MOD 30 MIN: CPT | Performed by: DERMATOLOGY

## 2022-02-22 RX ORDER — DOXYCYCLINE 100 MG/1
100 CAPSULE ORAL 2 TIMES DAILY
Qty: 180 CAPSULE | Refills: 0 | Status: SHIPPED | OUTPATIENT
Start: 2022-02-22 | End: 2022-06-08

## 2022-02-22 NOTE — NURSING NOTE
"Joel Pineda's goals for this visit include:   Chief Complaint   Patient presents with     Derm Problem     buttock \"cyst\"- opened yesterday and is soretoday/ has been using hot packs and soaking in epsom salt baths     He requests these members of his care team be copied on today's visit information:     PCP: Ksenia Lyles    Referring Provider:  No referring provider defined for this encounter.    There were no vitals taken for this visit.    Do you need any medication refills at today's visit? No  Sandy Menjivar, LUDA on 2/22/2022 at 12:58 PM      "

## 2022-02-22 NOTE — LETTER
2/22/2022         RE: Joel Pineda  [address] [facility] [place] [address]        Dear Colleague,    Thank you for referring your patient, Joel Pineda, to the [facility]. Please see a copy of my visit note below.    [facility] Dermatology Note  Encounter Date: Feb 22, 2022  Office Visit     Dermatology Problem List:  1. Immunosuppresion for ankylosing spondylitis, on Enbrel.  -off Enbrel at this time for shingles, plan is to reinitiate  2. Acne vulgaris s/p Accutane in 1990's  3. Folliculitis  - BPO wash, Hibiclens, clobetasol for flares  4. HS - active lesion on the left buttock  - doxy 100mg BID x 3 months  - referral to Dr. Tilley for excision.  ____________________________________________    Assessment & Plan:    # Hidradenitis suppurativa, buttock, 1 lesion, 1 active tract(pt declines further exam). Chronic, resolving today overall as recent self drainage. No tenderness or pus or expanding erythema. There is no nodule to drain. This is consistent with recent HS flare  - Currently on Enbrel. Reviewed this may be helpful.  - Start doxycycline 100mg BID x 3 months with food and a large glass of water, avoiding dairy and vitamins. Reviewed this may take 4-6 weeks to see improvement. Reviewed patient has GERD. Managed on prilosec. Reviewed to hold if acid reflux worsens.  - Continue BPO wash or Hibiclens.  - Continue clindamycin for flares.  - Referral to Dr. Tilley for excision.  - Go to ER or urgent care for signs of infection including increasing pain, increasing redness, discharge such as pus, fevers, chills or if not feeling well.      # Immunosuppression with Enbrel.   - Recommended total skin exam as per last visit, schedule after excision with Dr. Tilley    # Acne vulgaris. Chronic, stable.  - Continue BPO wash.  - Continue clindamycin gel daily.    Procedures Performed:   None.    Follow-up: For total skin exam. Also in 1 weeks photos and phone.  "    Go to ER or urgent care for signs of infection including increasing pain, increasing redness, discharge such as pus, fevers, chills or if not feeling well.      Staff and Scribe:     Scribe Disclosure:   I, Jordana Nguyen, am serving as a scribe to document services personally performed by this physician, Dr. Janice Gonzales, based on data collection and the provider's statements to me.    Provider Disclosure:   The documentation recorded by the scribe accurately reflects the services I personally performed and the decisions made by me.    Janice Gonzales MD    Department of Dermatology  [facility] [place]: Phone: [phone], Fax:[phone]  [facility]: Phone: [phone], Fax: [phone]      ____________________________________________    CC: Derm Problem (buttock \"cyst\"- opened yesterday and is soretoday/ has been using hot packs and soaking in epsom salt baths)    HPI:  Mr. Joel Pineda is a(n) 46 year old male who presents today as a return patient for spot check.    Last seen 12/31/21for folliculitis. Recommended BPO wash/hibiclens for maintenance and clobetasol BID for flares.    Today, Tito notes concern of a cyst on the buttock. This opened yesterday and is now sore. There was drainage of thick pus and blood. Currently doing hot compresses and espom salt baths. Reports no lesions in genital. Has not had any stomach ulcers.    Patient is otherwise feeling well, without additional skin concerns.    Labs Reviewed:  N/A    Physical Exam:  Vitals: There were no vitals taken for this visit.  SKIN: Focused examination of the face, underarms, and buttocks was performed. Declined genital exam.  - There area two pores noted on the left external buttock. No subcutaneous nodules noted.  - Acneiform papule on the right chin.  - Right axilla is clear.  - No other lesions of concern on areas " "examined.     Medications:  Current Outpatient Medications   Medication     acetaminophen 500 MG CAPS     albuterol (PROAIR HFA/PROVENTIL HFA/VENTOLIN HFA) 108 (90 Base) MCG/ACT inhaler     aspirin (ASA) 81 MG tablet     cetirizine (ZYRTEC) 10 MG tablet     cholecalciferol (D3-50) 1250 mcg (15269 units) capsule     clindamycin (CLINDAMAX) 1 % external gel     clonazePAM (KLONOPIN) 0.5 MG tablet     cyanocobalamin (CYANOCOBALAMIN) 1000 MCG/ML injection     dronabinol (MARINOL) 5 MG capsule     DULoxetine (CYMBALTA) 60 MG capsule     EPINEPHrine (ANY BX GENERIC EQUIV) 0.3 MG/0.3ML injection 2-pack     ergocalciferol (ERGOCALCIFEROL) 1.25 MG (85164 UT) capsule     eszopiclone (LUNESTA) 3 MG tablet     etanercept (ENBREL SURECLICK) 50 MG/ML autoinjector     famotidine (PEPCID) 20 MG tablet     fenofibrate (TRICOR) 48 MG tablet     fluticasone (FLONASE) 50 MCG/ACT nasal spray     fluticasone-salmeterol (ADVAIR) 250-50 MCG/DOSE inhaler     levothyroxine (SYNTHROID/LEVOTHROID) 75 MCG tablet     lidocaine (XYLOCAINE) 5 % external ointment     melatonin 3 MG tablet     methocarbamol (ROBAXIN) 500 MG tablet     metoclopramide (REGLAN) 5 MG tablet     metoprolol succinate ER (TOPROL-XL) 200 MG 24 hr tablet     montelukast (SINGULAIR) 10 MG tablet     nabumetone (RELAFEN) 500 MG tablet     naloxone (NARCAN) 4 MG/0.1ML nasal spray     olopatadine (PATADAY) 0.2 % ophthalmic solution     omega-3 acid ethyl esters (LOVAZA) 1 g capsule     omeprazole 20 MG tablet     ondansetron (ZOFRAN-ODT) 8 MG ODT tab     order for DME     order for DME     oxyCODONE (ROXICODONE) 5 MG tablet     pregabalin (LYRICA) 150 MG capsule     pyridostigmine (MESTINON) 60 MG tablet     ramipril (ALTACE) 10 MG capsule     risperiDONE (RISPERDAL) 0.5 MG tablet     rizatriptan (MAXALT-MLT) 5 MG ODT     rosuvastatin (CRESTOR) 40 MG tablet     syringe, disposable, (BD TUBERCULIN SYRINGE) 1 ML MISC     syringe/needle, disp, (BD INTEGRA SYRINGE) 25G X 1\" 3 ML " MISC     valACYclovir (VALTREX) 500 MG tablet     vitamin B complex with vitamin C (STRESS TAB) tablet     ZINC SULFATE-VITAMIN C MT     No current facility-administered medications for this visit.      Past Medical History:   Patient Active Problem List   Diagnosis     Intermittent asthma     Hyperlipidemia LDL goal <100     Chronic nonallergic rhinitis     Diverticulosis     GERD (gastroesophageal reflux disease)     Generalized anxiety disorder     LLOYD (obstructive sleep apnea)- mild (AHI 11)     Intracranial arachnoid cyst     Facet arthritis of cervical region     Acquired hypothyroidism     Bipolar 2 disorder (H)     Chronic midline low back pain without sciatica     Irritable bowel syndrome with diarrhea     B12 deficiency     Essential hypertension with goal blood pressure less than 140/90     Chronic, continuous use of opioids     Ankylosing spondylitis of sacral region (H)     Morbid obesity due to hypertriglyceridemia (H)     Fatty infiltration of liver     DDD (degenerative disc disease), lumbar     Type 2 diabetes mellitus with complication, without long-term current use of insulin (H)     Peripheral polyneuropathy     History of pulmonary embolism     Ingrown toenail     Hypertriglyceridemia     Gastroparesis     Orthostatic dizziness     POTS (postural orthostatic tachycardia syndrome)     PHN (postherpetic neuralgia)     Dysautonomia (H)     Chronic pain syndrome     Borderline personality disorder (H)     MDD (major depressive disorder), recurrent severe, without psychosis (H)     Folliculitis     Immunosuppression (H)     Past Medical History:   Diagnosis Date     Acne      Acquired hypothyroidism      Allergic state      Ankylosing spondylitis lumbar region (H)      Ankylosing spondylitis of sacral region (H)      Anxiety      Bipolar 2 disorder (H)      Chest pain     Chest pain, regulated w/BP meds. Clear arteries.     Chronic pain      DDD (degenerative disc disease), lumbar      Depressive  disorder      Diabetes (H)      Diverticulosis      Facet arthritis of cervical region      Gastroesophageal reflux disease      Hypertension      IBS (irritable bowel syndrome)      Intracranial arachnoid cyst      Major depressive disorder, recurrent episode (H)     Multiple psych providers - they manage meds August 2015: Provigil induced severe mood dis-function      Major depressive disorder, recurrent episode, severe (H) 12/2/2020     MDD (major depressive disorder), recurrent severe, without psychosis (H) 7/21/2021     LLOYD (obstructive sleep apnea)- mild (AHI 11)      Polyneuropathy      Pulmonary embolism (H)      Skin exam, screening for cancer 12/3/2013     Sleep apnea      Uncomplicated asthma         CC No referring provider defined for this encounter. on close of this encounter.      Again, thank you for allowing me to participate in the care of your patient.        Sincerely,        Janice Gonzales MD

## 2022-02-22 NOTE — PATIENT INSTRUCTIONS
[facility] Dermatology Visit    Thank you for allowing us to participate in your care. Your findings, instructions and follow-up plan are as follows:     Go to ER or urgent care for signs of infection including increasing pain, increasing redness, discharge such as pus, fevers, chills or if not feeling well.         When should I call my doctor?    If you are worsening or not improving, please, contact us or seek urgent care as noted below.     Who should I call with questions (adults)?    [facility] (adult and pediatric): [phone]    [facility] (adult): [phone]    For urgent needs outside of business hours call the [facility] at [phone] and ask for the dermatology resident on call    If this is a medical emergency and you are unable to reach an ER, Call [phone]    Who should I call with questions (pediatric)?  [facility]- Pediatric Dermatology  Dr. Christel Lizarraga, Dr. Caroline Grant, Dr. Carli Rivas, Denita Stoll, KEVAN Campoverde, Dr. Gracie Alonso & Dr. Evan Madrid  Non Urgent  Nurse Triage Line; [phone]- Amanda and Lakshmi RN Care Coordinators   [place] (/Complex ) [phone]    If you need a prescription refill, please contact your pharmacy. Refills are approved or denied by our physicians during normal business hours, Monday through Fridays  Per office policy, refills will not be granted if you have not been seen within the past year (or sooner depending on your child's condition).    Scheduling Information:  Pediatric Appointment Scheduling and Call Center [phone]  Radiology Scheduling- [phone]  Sedation Unit Scheduling- [phone]  [facility] Scheduling- General [phone]; Pediatric Dermatology [phone]  Main  Services: [phone]  Amharic: [phone]  Rwandan: [phone]  Hmong/Getachew/Pankaj:  [phone]  Preadmission Nursing Department Fax Number: [phone] (fax all pre-operative paperwork to this number)    For urgent matters arising during evenings, weekends, or holidays that cannot wait for normal business hours please call [phone] and ask for the dermatology resident on call to be paged.

## 2022-02-22 NOTE — NURSING NOTE
Excision previsit information                                                    Excision of: possible HS tract/ cystic area  Site(s): L buttock   -if site is the groin or below knee, send to RN for initiation of antibiotic prophylaxis protocol.    Medication & Allergy Information                                                    CURRENT MEDICATIONS:   Current Outpatient Medications   Medication Sig Dispense Refill     acetaminophen 500 MG CAPS Take 1,000 mg by mouth 2 times daily  60 capsule      albuterol (PROAIR HFA/PROVENTIL HFA/VENTOLIN HFA) 108 (90 Base) MCG/ACT inhaler Inhale 2 puffs into the lungs every 4 hours as needed for shortness of breath / dyspnea or wheezing 8.5 g 11     aspirin (ASA) 81 MG tablet Take 81 mg by mouth daily        cetirizine (ZYRTEC) 10 MG tablet Take 1 tablet (10 mg) by mouth At Bedtime 30 tablet 11     cholecalciferol (D3-50) 1250 mcg (55957 units) capsule TAKE 1 CAPSULE BY MOUTH EVERY 2 WEEKS 24 capsule 0     clindamycin (CLINDAMAX) 1 % external gel For flares, start twice daily 60 g 3     clonazePAM (KLONOPIN) 0.5 MG tablet Take 0.5 mg by mouth 2 times daily as needed        cyanocobalamin (CYANOCOBALAMIN) 1000 MCG/ML injection INJECT 1 ML INTO THE MUSCLE EVERY 30 DAYS 10 mL 0     dronabinol (MARINOL) 5 MG capsule Take 1 capsule by mouth 2 times daily       DULoxetine (CYMBALTA) 60 MG capsule Take 120 mg by mouth daily        EPINEPHrine (ANY BX GENERIC EQUIV) 0.3 MG/0.3ML injection 2-pack Inject 0.3 mLs (0.3 mg) into the muscle once as needed for anaphylaxis 0.6 mL 3     ergocalciferol (ERGOCALCIFEROL) 1.25 MG (59702 UT) capsule take 1 capsule by ORAL route every 2 weeks       eszopiclone (LUNESTA) 3 MG tablet Take 3 mg by mouth At Bedtime        etanercept (ENBREL SURECLICK) 50 MG/ML autoinjector Inject 50 mg Subcutaneous once a week . Hold for signs of infection, and seek medical attention. 4 mL 7     famotidine (PEPCID) 20 MG tablet Prior to administration of Humira every 2  weeks (Patient taking differently: Take 20 mg by mouth every 7 days Prior to Enbrel Injections to prevent skin reaction)       fenofibrate (TRICOR) 48 MG tablet Take 1 tablet (48 mg) by mouth daily 90 tablet 1     fluticasone (FLONASE) 50 MCG/ACT nasal spray Spray 1 spray into both nostrils daily       fluticasone-salmeterol (ADVAIR) 250-50 MCG/DOSE inhaler Inhale 1 puff into the lungs every 12 hours 14 each 11     levothyroxine (SYNTHROID/LEVOTHROID) 75 MCG tablet TAKE 1 TABLET EVERY MORNING 90 tablet 3     lidocaine (XYLOCAINE) 5 % external ointment APPLY TOPICALLY 4 TIMES DAILY AS NEEDED FOR PAIN  50 g 2     melatonin 3 MG tablet Take 13 mg by mouth nightly as needed        methocarbamol (ROBAXIN) 500 MG tablet Take 1-2 tablets (500-1,000 mg) by mouth 4 times daily as needed for muscle spasms 240 tablet 1     metoclopramide (REGLAN) 5 MG tablet Take 5 mg by mouth 2 times daily       metoprolol succinate ER (TOPROL-XL) 200 MG 24 hr tablet TAKE ONE TABLET BY MOUTH TWICE DAILY  180 tablet 1     montelukast (SINGULAIR) 10 MG tablet Take 1 tablet (10 mg) by mouth every evening 90 tablet 3     nabumetone (RELAFEN) 500 MG tablet TAKE 1-2 TABLETS BY MOUTH TWICE DAILY WITH FOOD AS NEEDED FOR MODERATE PAIN 120 tablet 0     naloxone (NARCAN) 4 MG/0.1ML nasal spray Spray 1 spray (4 mg) into one nostril alternating nostrils once as needed for opioid reversal every 2-3 minutes until assistance arrives 0.2 mL 0     olopatadine (PATADAY) 0.2 % ophthalmic solution Place 1 drop into both eyes daily 2.5 mL 3     omega-3 acid ethyl esters (LOVAZA) 1 g capsule TAKE TWO CAPSULES BY MOUTH TWICE DAILY 360 capsule 0     omeprazole 20 MG tablet Take 20 mg by mouth daily        ondansetron (ZOFRAN-ODT) 8 MG ODT tab Take 8 mg by mouth every 8 hours as needed for nausea       order for DME Equipment being ordered: lumbosacral belt/brace 1 Units 0     order for DME Respironics REMSTAR 60 Series Auto CPAP 9-13 cm H2O, Wisp nasal mask w/a large  "cushion and a chinstrap       oxyCODONE (ROXICODONE) 5 MG tablet Take 1 tablet (5 mg) by mouth every 6 hours as needed for pain (maximum 6 tablet(s) per day) 35 tablet 0     pregabalin (LYRICA) 150 MG capsule Take 1 capsule (150 mg) by mouth 3 times daily (Patient taking differently: Take 150 mg by mouth 2 times daily ) 270 capsule 1     pyridostigmine (MESTINON) 60 MG tablet Take 1 tablet by mouth 5 times daily       ramipril (ALTACE) 10 MG capsule Take 1 capsule (10 mg) by mouth daily 90 capsule 1     risperiDONE (RISPERDAL) 0.5 MG tablet as needed       rizatriptan (MAXALT-MLT) 5 MG ODT DISSOLVE 1 TABLET BY MOUTH AT ONSET OF HEADACHE 30 tablet 0     rosuvastatin (CRESTOR) 40 MG tablet Take 1 tablet (40 mg) by mouth daily 90 tablet 0     syringe, disposable, (BD TUBERCULIN SYRINGE) 1 ML MISC Equipment being ordered: 1 ml tuberculin syringes to be used for Vitamin B12 injections. 12 each 11     syringe/needle, disp, (BD INTEGRA SYRINGE) 25G X 1\" 3 ML MISC USE FOR VITAMIN B12 INJECTIONS 12 each 0     valACYclovir (VALTREX) 500 MG tablet Take 1 tablet (500 mg) by mouth daily 90 tablet 1     vitamin B complex with vitamin C (STRESS TAB) tablet Take 1 tablet by mouth daily       ZINC SULFATE-VITAMIN C MT Take 1 tablet by mouth daily         Do you take the following medications:  Coumadin, Eliquis, Pradaxa, Xarelto:   YES   -If on Coumadin, INR should be checked within 7 days of surgery.  Range should be 3.5 or less or within therapeutic range.    Do you have an ALLERGIC REACTION to any medications:  YES   Amoxicillin-pot clavulanate, Banana, Nitroglycerin, Penicillins, Provigil [modafinil], Gadolinium, Ketoconazole, Dye [contrast dye], Gabapentin, Golimumab, Naproxen, Neurontin [gabapentin], Nortriptyline, Varicella virus vaccine live, Flagyl [metronidazole hcl], Latex, and Metronidazole    Past Medical History                                                    Do you currently or have you previously had any of the " following conditions:       HIV/AIDS:  NO    Hepatitis:  NO  Suppressed immune system/Organ transplant:  YES- Ankylosing spondylitis           Autoimmune disorder (eg RA, lupus):  YES    Prolonged bleeding or bleeding disorder:  YES- hx of PE, on baby aspirin/ >5 years ago    History of cold sores or shingles at surgical site:  NO  If yes, notify provider for possible need for Valtrex.    Pacemaker or Defibrillator:  NO.      History of artificial or heart valve replacement:  NO    Endocarditis/Rheumatic fever: NO    Have you been told you should take antibiotic prior to dental work or surgery:  NO    Joint replacement within past 2 years: NO    Previous prosthetic joint infection: NO    Diabetes: NO    Pregnant or Breastfeeding: NO    Keloids or abnormal scars: YES    Mobility device (wheelchair, transfer difficulty): NO    Tobacco use:  NO.      If any positives, send to RN for further review  Sandy Menjivar, CMA

## 2022-02-22 NOTE — PROGRESS NOTES
[facility] Dermatology Note  Encounter Date: Feb 22, 2022  Office Visit     Dermatology Problem List:  1. Immunosuppresion for ankylosing spondylitis, on Enbrel.  -off Enbrel at this time for shingles, plan is to reinitiate  2. Acne vulgaris s/p Accutane in 1990's  3. Folliculitis  - BPO wash, Hibiclens, clobetasol for flares  4. HS - active lesion on the left buttock  - doxy 100mg BID x 3 months  - referral to Dr. Tilley for excision.  ____________________________________________    Assessment & Plan:    # Hidradenitis suppurativa, buttock, 1 lesion, 1 active tract(pt declines further exam). Chronic, resolving today overall as recent self drainage. No tenderness or pus or expanding erythema. There is no nodule to drain. This is consistent with recent HS flare  - Currently on Enbrel. Reviewed this may be helpful.  - Start doxycycline 100mg BID x 3 months with food and a large glass of water, avoiding dairy and vitamins. Reviewed this may take 4-6 weeks to see improvement. Reviewed patient has GERD. Managed on prilosec. Reviewed to hold if acid reflux worsens.  - Continue BPO wash or Hibiclens.  - Continue clindamycin for flares.  - Referral to Dr. Tilley for excision.  - Go to ER or urgent care for signs of infection including increasing pain, increasing redness, discharge such as pus, fevers, chills or if not feeling well.      # Immunosuppression with Enbrel.   - Recommended total skin exam as per last visit, schedule after excision with Dr. Tilley    # Acne vulgaris. Chronic, stable.  - Continue BPO wash.  - Continue clindamycin gel daily.    Procedures Performed:   None.    Follow-up: For total skin exam. Also in 1 weeks photos and phone.     Go to ER or urgent care for signs of infection including increasing pain, increasing redness, discharge such as pus, fevers, chills or if not feeling well.      Staff and Scribe:     Scribe Disclosure:   Jordana DEVRIES, am serving as a scribe to  "document services personally performed by this physician, Dr. Janice Gonzales, based on data collection and the provider's statements to me.    Provider Disclosure:   The documentation recorded by the scribe accurately reflects the services I personally performed and the decisions made by me.    Janice Gonzales MD    Department of Dermatology  [facility]: Phone: [phone], Fax:[phone]  [facility]: Phone: [phone], Fax: [phone]      ____________________________________________    CC: Derm Problem (buttock \"cyst\"- opened yesterday and is soretoday/ has been using hot packs and soaking in epsom salt baths)    HPI:  Mr. Joel Pineda is a(n) 46 year old male who presents today as a return patient for spot check.    Last seen 12/31/21for folliculitis. Recommended BPO wash/hibiclens for maintenance and clobetasol BID for flares.    Today, Tito notes concern of a cyst on the buttock. This opened yesterday and is now sore. There was drainage of thick pus and blood. Currently doing hot compresses and espom salt baths. Reports no lesions in genital. Has not had any stomach ulcers.    Patient is otherwise feeling well, without additional skin concerns.    Labs Reviewed:  N/A    Physical Exam:  Vitals: There were no vitals taken for this visit.  SKIN: Focused examination of the face, underarms, and buttocks was performed. Declined genital exam.  - There area two pores noted on the left external buttock. No subcutaneous nodules noted.  - Acneiform papule on the right chin.  - Right axilla is clear.  - No other lesions of concern on areas examined.     Medications:  Current Outpatient Medications   Medication     acetaminophen 500 MG CAPS     albuterol (PROAIR HFA/PROVENTIL HFA/VENTOLIN HFA) 108 (90 Base) MCG/ACT inhaler     aspirin (ASA) 81 MG tablet     cetirizine (ZYRTEC) 10 MG tablet     " "cholecalciferol (D3-50) 1250 mcg (01274 units) capsule     clindamycin (CLINDAMAX) 1 % external gel     clonazePAM (KLONOPIN) 0.5 MG tablet     cyanocobalamin (CYANOCOBALAMIN) 1000 MCG/ML injection     dronabinol (MARINOL) 5 MG capsule     DULoxetine (CYMBALTA) 60 MG capsule     EPINEPHrine (ANY BX GENERIC EQUIV) 0.3 MG/0.3ML injection 2-pack     ergocalciferol (ERGOCALCIFEROL) 1.25 MG (99764 UT) capsule     eszopiclone (LUNESTA) 3 MG tablet     etanercept (ENBREL SURECLICK) 50 MG/ML autoinjector     famotidine (PEPCID) 20 MG tablet     fenofibrate (TRICOR) 48 MG tablet     fluticasone (FLONASE) 50 MCG/ACT nasal spray     fluticasone-salmeterol (ADVAIR) 250-50 MCG/DOSE inhaler     levothyroxine (SYNTHROID/LEVOTHROID) 75 MCG tablet     lidocaine (XYLOCAINE) 5 % external ointment     melatonin 3 MG tablet     methocarbamol (ROBAXIN) 500 MG tablet     metoclopramide (REGLAN) 5 MG tablet     metoprolol succinate ER (TOPROL-XL) 200 MG 24 hr tablet     montelukast (SINGULAIR) 10 MG tablet     nabumetone (RELAFEN) 500 MG tablet     naloxone (NARCAN) 4 MG/0.1ML nasal spray     olopatadine (PATADAY) 0.2 % ophthalmic solution     omega-3 acid ethyl esters (LOVAZA) 1 g capsule     omeprazole 20 MG tablet     ondansetron (ZOFRAN-ODT) 8 MG ODT tab     order for DME     order for DME     oxyCODONE (ROXICODONE) 5 MG tablet     pregabalin (LYRICA) 150 MG capsule     pyridostigmine (MESTINON) 60 MG tablet     ramipril (ALTACE) 10 MG capsule     risperiDONE (RISPERDAL) 0.5 MG tablet     rizatriptan (MAXALT-MLT) 5 MG ODT     rosuvastatin (CRESTOR) 40 MG tablet     syringe, disposable, (BD TUBERCULIN SYRINGE) 1 ML MISC     syringe/needle, disp, (BD INTEGRA SYRINGE) 25G X 1\" 3 ML MISC     valACYclovir (VALTREX) 500 MG tablet     vitamin B complex with vitamin C (STRESS TAB) tablet     ZINC SULFATE-VITAMIN C MT     No current facility-administered medications for this visit.      Past Medical History:   Patient Active Problem List "   Diagnosis     Intermittent asthma     Hyperlipidemia LDL goal <100     Chronic nonallergic rhinitis     Diverticulosis     GERD (gastroesophageal reflux disease)     Generalized anxiety disorder     LLOYD (obstructive sleep apnea)- mild (AHI 11)     Intracranial arachnoid cyst     Facet arthritis of cervical region     Acquired hypothyroidism     Bipolar 2 disorder (H)     Chronic midline low back pain without sciatica     Irritable bowel syndrome with diarrhea     B12 deficiency     Essential hypertension with goal blood pressure less than 140/90     Chronic, continuous use of opioids     Ankylosing spondylitis of sacral region (H)     Morbid obesity due to hypertriglyceridemia (H)     Fatty infiltration of liver     DDD (degenerative disc disease), lumbar     Type 2 diabetes mellitus with complication, without long-term current use of insulin (H)     Peripheral polyneuropathy     History of pulmonary embolism     Ingrown toenail     Hypertriglyceridemia     Gastroparesis     Orthostatic dizziness     POTS (postural orthostatic tachycardia syndrome)     PHN (postherpetic neuralgia)     Dysautonomia (H)     Chronic pain syndrome     Borderline personality disorder (H)     MDD (major depressive disorder), recurrent severe, without psychosis (H)     Folliculitis     Immunosuppression (H)     Past Medical History:   Diagnosis Date     Acne      Acquired hypothyroidism      Allergic state      Ankylosing spondylitis lumbar region (H)      Ankylosing spondylitis of sacral region (H)      Anxiety      Bipolar 2 disorder (H)      Chest pain     Chest pain, regulated w/BP meds. Clear arteries.     Chronic pain      DDD (degenerative disc disease), lumbar      Depressive disorder      Diabetes (H)      Diverticulosis      Facet arthritis of cervical region      Gastroesophageal reflux disease      Hypertension      IBS (irritable bowel syndrome)      Intracranial arachnoid cyst      Major depressive disorder, recurrent  episode (H)     Multiple psych providers - they manage meds August 2015: Provigil induced severe mood dis-function      Major depressive disorder, recurrent episode, severe (H) 12/2/2020     MDD (major depressive disorder), recurrent severe, without psychosis (H) 7/21/2021     LLOYD (obstructive sleep apnea)- mild (AHI 11)      Polyneuropathy      Pulmonary embolism (H)      Skin exam, screening for cancer 12/3/2013     Sleep apnea      Uncomplicated asthma         CC No referring provider defined for this encounter. on close of this encounter.

## 2022-02-28 ENCOUNTER — TELEPHONE (OUTPATIENT)
Dept: PALLIATIVE MEDICINE | Facility: CLINIC | Age: 47
End: 2022-02-28
Payer: MEDICARE

## 2022-02-28 ENCOUNTER — TELEPHONE (OUTPATIENT)
Dept: NEUROSURGERY | Facility: CLINIC | Age: 47
End: 2022-02-28
Payer: MEDICARE

## 2022-02-28 DIAGNOSIS — M51.369 DDD (DEGENERATIVE DISC DISEASE), LUMBAR: Primary | ICD-10-CM

## 2022-02-28 NOTE — TELEPHONE ENCOUNTER
Got message from RN for Dr. Gallagher  Pt needs epidural steroid injection at L5, and need an order    Will pend order  Of note, reviewed imaging, no significant posterior epidural space at L5- will plan for transforaminal epidural steroid injection.    Do you want right only, or bilateral?    Please sign and route back.    Ericka Diaz MD  [facility]

## 2022-03-01 ENCOUNTER — E-VISIT (OUTPATIENT)
Dept: FAMILY MEDICINE | Facility: CLINIC | Age: 47
End: 2022-03-01
Payer: MEDICARE

## 2022-03-01 DIAGNOSIS — R19.7 DIARRHEA, UNSPECIFIED TYPE: Primary | ICD-10-CM

## 2022-03-01 DIAGNOSIS — M51.369 DDD (DEGENERATIVE DISC DISEASE), LUMBAR: ICD-10-CM

## 2022-03-01 PROCEDURE — 99421 OL DIG E/M SVC 5-10 MIN: CPT | Performed by: FAMILY MEDICINE

## 2022-03-01 NOTE — PATIENT INSTRUCTIONS
Thank you for choosing us for your care. Given your symptoms, I would like you to do a lab-only visit to determine what is causing them.  I have placed the orders.  Please schedule an appointment with the lab right here in [place], or call [phone].  I will let you know when the results are back and next steps to take.

## 2022-03-02 ENCOUNTER — HOSPITAL ENCOUNTER (OUTPATIENT)
Dept: BEHAVIORAL HEALTH | Facility: CLINIC | Age: 47
End: 2022-03-02
Attending: PSYCHIATRY & NEUROLOGY
Payer: MEDICARE

## 2022-03-02 DIAGNOSIS — Z91.041 CONTRAST MEDIA ALLERGY: Primary | ICD-10-CM

## 2022-03-02 PROCEDURE — 90853 GROUP PSYCHOTHERAPY: CPT | Mod: HQ,PO | Performed by: SOCIAL WORKER

## 2022-03-02 PROCEDURE — 90853 GROUP PSYCHOTHERAPY: CPT | Mod: GT | Performed by: SOCIAL WORKER

## 2022-03-02 RX ORDER — METHOCARBAMOL 500 MG/1
TABLET, FILM COATED ORAL
Qty: 240 TABLET | Refills: 0 | Status: SHIPPED | OUTPATIENT
Start: 2022-03-02 | End: 2022-04-05
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
## 2022-03-05 ENCOUNTER — MYC REFILL (OUTPATIENT)
Dept: FAMILY MEDICINE | Facility: CLINIC | Age: 47
End: 2022-03-05
Payer: MEDICARE

## 2022-03-05 DIAGNOSIS — M51.369 DDD (DEGENERATIVE DISC DISEASE), LUMBAR: ICD-10-CM

## 2022-03-05 DIAGNOSIS — M45.8 ANKYLOSING SPONDYLITIS OF SACRAL REGION (H): ICD-10-CM

## 2022-03-06 DIAGNOSIS — M45.8 ANKYLOSING SPONDYLITIS OF SACRAL REGION (H): ICD-10-CM

## 2022-03-06 DIAGNOSIS — M51.369 DDD (DEGENERATIVE DISC DISEASE), LUMBAR: ICD-10-CM

## 2022-03-08 RX ORDER — OXYCODONE HYDROCHLORIDE 5 MG/1
5 TABLET ORAL EVERY 6 HOURS PRN
Qty: 35 TABLET | Refills: 0 | Status: SHIPPED | OUTPATIENT
Start: 2022-03-08 | End: 2022-03-18

## 2022-03-08 RX ORDER — METHYLPREDNISOLONE 32 MG/1
TABLET ORAL
Qty: 2 TABLET | Refills: 0 | Status: SHIPPED | OUTPATIENT
Start: 2022-03-08 | End: 2022-03-28

## 2022-03-08 RX ORDER — DIPHENHYDRAMINE HCL 50 MG
CAPSULE ORAL
Qty: 1 CAPSULE | Refills: 0 | Status: SHIPPED | OUTPATIENT
Start: 2022-03-08 | End: 2022-03-28

## 2022-03-08 RX ORDER — NABUMETONE 500 MG/1
TABLET, FILM COATED ORAL
Qty: 120 TABLET | Refills: 0 | Status: SHIPPED | OUTPATIENT
Start: 2022-03-08 | End: 2022-04-05

## 2022-03-08 NOTE — TELEPHONE ENCOUNTER
"Contrast dye allergy reaction:  hives  Is premedicating needed? Yes     Injection scheduled for:  3/15/22 @ 1996.    Pharmacy: [facility] in [place]    Contrast dye allergy pre-medication plan:    Take oral Medrol 32mg 12 hours before procedure time     Take oral Medrol 32mg again 2 hours before procedure time     Take oral benadryl (diphenhydramine) 50mg 1 hour before procedure time.                   Note:  Benadryl usually comes in 25mg tabs so it would be 2 tabs.    Was the above relayed to pt?: Yes     Injection intake questions reviewed?  YES  Infection/antibiotic information reviewed?  YES  Location confirmed: :Yes   Is pt arriving early?:Yes 60\"    Routed to provider to review and sign off on meds.    Eloisa RN-BSN  [facility]                 "

## 2022-03-08 NOTE — TELEPHONE ENCOUNTER
Note: pt was scheduled for an epidural steroid injection today.  The injection was not done due to contrast allergy. Injection rescheduled for next week.    ESTEFANI Amin-BSN  [facility]

## 2022-03-08 NOTE — TELEPHONE ENCOUNTER
Routing refill request to provider for review/approval because:  Drug not on the [facility] refill protocol.       Carolynn Frank RN  [facility]

## 2022-03-08 NOTE — TELEPHONE ENCOUNTER
Warnings Override History for nabumetone (RELAFEN) 500 MG tablet [[id number]]    Overridden by Jamal Montoya on Feb 15, 2022 11:05 AM   Allergy/Contraindication   1. NAPROXEN [Level: Drug Class Match]     Please address warning prior to refill       Prescription approved per [facility] Refill Protocol.  Ankita Guerra RN, BSN   [facility]

## 2022-03-09 ENCOUNTER — HOSPITAL ENCOUNTER (OUTPATIENT)
Dept: BEHAVIORAL HEALTH | Facility: CLINIC | Age: 47
Discharge: HOME | End: 2022-03-09
Attending: PSYCHIATRY & NEUROLOGY
Payer: MEDICARE

## 2022-03-09 PROCEDURE — 90853 GROUP PSYCHOTHERAPY: CPT | Mod: HQ,PO | Performed by: SOCIAL WORKER

## 2022-03-09 PROCEDURE — 90853 GROUP PSYCHOTHERAPY: CPT | Mod: GT | Performed by: SOCIAL WORKER

## 2022-03-09 NOTE — GROUP NOTE
Process Group Note    PATIENT'S NAME: Joel Pineda  MRN:   [id number]  :   1975  ACCT. NUMBER: [id number]  DATE OF SERVICE: 3/09/22  START TIME:  1:00 PM  END TIME:  2:50 PM  FACILITATOR: Magali Dodson [facility]  TOPIC: MH Process Group    Diagnoses:  296.33 (F33.2) Major Depressive Disorder, Recurrent Episode, Severe _.   300.00 (F41.9) Unspecified Anxiety Disorder  301.83 (F60.3) Borderline Personality Disorder.      [facility]  TRACK: [facility]    NUMBER OF PARTICIPANTS: 7                                      Service Modality:  Video Visit     Telemedicine Visit: The patient's condition can be safely assessed and treated via synchronous audio and visual telemedicine encounter.      Reason for Telemedicine Visit: Services only offered telehealth    Originating Site (Patient Location): Patient's home    Distant Site (Provider Location): Provider Remote Setting- Home Office    Consent:  The patient/guardian has verbally consented to: the potential risks and benefits of telemedicine (video visit) versus in person care; bill my insurance or make self-payment for services provided; and responsibility for payment of non-covered services.     Patient would like the video invitation sent by:  My Chart    Mode of Communication:  Video Conference via Medical Zoom    As the provider I attest to compliance with applicable laws and regulations related to telemedicine.  Client participated in discussion on crisis planning and on seeking help for loved ones.  Client identified community resources they may use for themselves or others.        Data:    Session content: At the start of this group, patients were invited to check in by identifying themselves, describing their current emotional status, and identifying issues to address in this group.   Area(s) of treatment focus addressed in this session included Symptom Management, Personal Safety and Community Resources/Discharge  Planning.  Tito reported having passive suicidal thoughts after finding out that his pain management doctor  unexpectedly. He stated that the suicidal thoughts were present for a couple of days but are absent today.  He shared that he met the new pain doctor who he also liked.  He identifies ways to honor the grief from the loss of the long term positive relationship with this provider.  He reported feeling ready to complete the program.  He set goal of taking cat to the vet.      Therapeutic Interventions/Treatment Strategies:  Psychotherapist offered support, feedback and validation and reinforced use of skills. Treatment modalities used include Cognitive Behavioral Therapy. Interventions include Coping Skills: Assisted patient in identifying 1-2 healthy distraction skills to reduce overall distress.    Assessment:    Patient response:   Patient responded to session by listening, focusing on goals and being attentive    Possible barriers to participation / learning include: and no barriers identified    Health Issues:   None reported       Substance Use Review:   Substance Use: No active concerns identified.    Mental Status/Behavioral Observations  Appearance:   Appropriate   Eye Contact:   Good   Psychomotor Behavior: Normal   Attitude:   Cooperative  Friendly Pleasant  Orientation:   All  Speech   Rate / Production: Normal    Volume:  Normal   Mood:    Anxious  Depressed   Affect:    Appropriate   Thought Content:   Clear  Thought Form:  Coherent  Logical     Insight:    Good     Plan:     Safety Plan: No current safety concerns identified.  Recommended that patient call 911 or go to the local ED should there be a change in any of these risk factors.     Barriers to treatment: None identified    Patient Contracts (see media tab):  None    Substance Use: Not addressed in session     Continue or Discharge: Patient will continue in Adult Day Treatment (ADT)  as planned. Patient is likely to benefit from  learning and using skills as they work toward the goals identified in their treatment plan.      Magali Dodson, [facility]  March 9, 2022

## 2022-03-15 ENCOUNTER — RADIOLOGY INJECTION OFFICE VISIT (OUTPATIENT)
Dept: PALLIATIVE MEDICINE | Facility: CLINIC | Age: 47
End: 2022-03-15
Payer: MEDICARE

## 2022-03-15 VITALS — RESPIRATION RATE: 18 BRPM | DIASTOLIC BLOOD PRESSURE: 88 MMHG | SYSTOLIC BLOOD PRESSURE: 144 MMHG | HEART RATE: 89 BPM

## 2022-03-15 DIAGNOSIS — M54.16 LUMBAR RADICULOPATHY: ICD-10-CM

## 2022-03-15 PROCEDURE — 64483 NJX AA&/STRD TFRM EPI L/S 1: CPT | Mod: 50 | Performed by: PAIN MEDICINE

## 2022-03-15 ASSESSMENT — PAIN SCALES - GENERAL
PAINLEVEL: NO PAIN (1)
PAINLEVEL: MODERATE PAIN (5)

## 2022-03-15 NOTE — PATIENT INSTRUCTIONS
[facility]   Procedure Discharge Instructions    Today you saw:  Dr. Bubba Montgomery      You had an:  Epidural steroid injection      Medications used:  Lidocaine   Bupivacaine   Dexamethasone Omnipaque Normal saline            Be cautious when walking. Numbness and/or weakness in the lower extremities may occur for up to 6-8 hours after the procedure due to effect of the local anesthetic    Do not drive for 6 hours. The effect of the local anesthetic could slow your reflexes.     You may resume your regular activities after 24 hours    Avoid strenuous activity for the first 24 hours    You may shower, however avoid swimming, tub baths or hot tubs for 24 hours following your procedure    You may have a mild to moderate increase in pain for several days following the injection.    It may take up to 14 days for the steroid medication to start working although you may feel the effect as early as a few days after the procedure.       You may use ice packs for 10-15 minutes, 3 to 4 times a day at the injection site for comfort    Do not use heat to painful areas for 6 to 8 hours. This will give the local anesthetic time to wear off and prevent you from accidentally burning your skin.     Unless you have been directed to avoid the use of anti-inflammatory medications (NSAIDS), you may use medications such as ibuprofen, Aleve or Tylenol for pain control if needed.     If you were fasting, you may resume your normal diet and medications after the procedure    If you have diabetes, check your blood sugar more frequently than usual as your blood sugar may be higher than normal for 10-14 days following a steroid injection. Contact your doctor who manages your diabetes if your blood sugar is higher than usual    Possible side effects of steroids that you may experience include flushing, elevated blood pressure, increased appetite, mild headaches and restlessness.  All of these symptoms will get  better with time.    If you experience any of the following, call the Pain Clinic during work hours (Mon-Friday 8-4:30 pm) at [phone] or the Provider Line after hours at [phone]:  -Fever over 100 degree F  -Swelling, bleeding, redness, drainage, warmth at the injection site  -Progressive weakness or numbness in your legs   -Loss of bowel or bladder function  -Unusual headache that is not relieved by Tylenol or other pain reliever  -Unusual new onset of pain that is not improving

## 2022-03-15 NOTE — NURSING NOTE
20 gauge Peripheral IV inserted into right anticubital - attempts: 2    Discharge Information    IV Discontiued Time:  1430    Amount of Fluid Infused:  NA    Discharge Criteria = When patient returns to baseline or as per MD order    Consciousness:  Pt is fully awake    Circulation:  BP +/- 20% of pre-procedure level    Respiration:  Patient is able to breathe deeply    O2 Sat:  Patient is able to maintain O2 Sat >92% on room air    Activity:  Moves 4 extremities on command    Ambulation:  Patient is able to stand and walk or stand and pivot into wheelchair    Dressing:  Clean/dry or No Dressing    Notes:   Discharge instructions and AVS given to patient    Patient meets criteria for discharge?  YES    Admitted to PCU?  No    Responsible adult present to accompany patient home?  Yes    Signature/Title:    Adriana Read, RN  RN Care Coordinator  [facility]

## 2022-03-15 NOTE — NURSING NOTE
Pre-procedure Intake  If YES to any questions or NO to having a   Please complete laminated checklist and leave on the computer keyboard for Provider, verbally inform provider if able.    For SCS Trial, RFA's or any sedation procedure:  Have you been fasting? NA    If yes, for how long? NA    Are you taking any any blood thinners such as Coumadin, Warfarin, Jantoven, Pradaxa Xarelto, Eliquis, Edoxaban, Enoxaparin, Lovenox, Heparin, Arixtra, Fondaparinux, or Fragmin? OR Antiplatelet medication such as Plavix, Brilinta, or Effient?   No     If yes, when did you take your last dose? NA    Do you take aspirin?  No    If cervical procedure, have you held aspirin for 6 days?   NA    Do you have any allergies to contrast dye, iodine, steroid and/or numbing medications?  YES: Contrast Dye    Are you currently taking antibiotics or have an active infection?  NO    Have you had a fever/elevated temperature within the past week? NO    Are you currently taking oral steroids? NO    Do you have a ? Yes    Are you pregnant or breastfeeding?  NO    Have you received the COVID-19 vaccine? Yes    If yes, was it your 1st, 2nd or only dose needed? 3rd    Date of most recent vaccine: 08/23/21    Notify provider and RNs if systolic BP >170, diastolic BP >100, P >100 or O2 sats < 90%      .valeria

## 2022-03-21 RX ORDER — DEXAMETHASONE SODIUM PHOSPHATE 10 MG/ML
10 INJECTION, SOLUTION INTRAMUSCULAR; INTRAVENOUS ONCE
Status: COMPLETED | OUTPATIENT
Start: 2022-03-21 | End: 2022-03-21

## 2022-03-21 RX ADMIN — DEXAMETHASONE SODIUM PHOSPHATE 10 MG: 10 INJECTION, SOLUTION INTRAMUSCULAR; INTRAVENOUS at 08:47

## 2022-03-21 NOTE — PROGRESS NOTES
Pre procedure Diagnosis: lumbar radiculopathy, lumbar degenerative disc disease   Post procedure Diagnosis: Same  Procedure performed: lumbar transforaminal epidural steroid injection at Bilateral L5-S1, fluoroscopically guided, contrast controlled  Anesthesia: none  Complications: none  Operators: Bubba Montgomery MD     Indications:   Joel Pineda is a 46 year old male.  They have a history of bilateral LBP radiating to her LE.  Exam shows negs lump and they have tried conservative treatment including meds/pt/injections.    MRI reviewed   Options/alternatives, benefits and risks were discussed with the patient including bleeding, infection, tissue trauma, numbness, weakness, paralysis, spinal cord injury, radiation exposure, headache and reaction to medications. Questions were answered to his satisfaction and he agrees to proceed. Voluntary informed consent was obtained and signed.     Vitals were reviewed: Yes  BP (!) 144/88   Pulse 89   Resp 18   Allergies were reviewed:  Yes \  Medications were reviewed:  Yes   Pre-procedure pain score: 8/10    Procedure:  After getting informed consent, patient was brought into the procedure suite and was placed in a prone position on the procedure table.   A Pause for the Cause was performed.  Patient was prepped and draped in sterile fashion.     After identifying the right, bilateral L5-S1 neuroforamen, the C-arm was rotated to a bilateral lateral oblique angle.  A total of 4ml of Lidocaine 1% was used to anesthetize the skin and the needle track at a skin entry site coaxial with the fluoroscopy beam, and overriding the superior aspect of the neuroforamen.  A 22 gauge 3.5 inch spinal needle was advanced under intermittent fluoroscopy until it entered the foramen superiorly.    The position was then inspected from anteroposterior and lateral views, and the needle adjusted appropriately.  A total of 1ml of Omnipaque-300 was injected, confirming appropriate position, with  spread into the nerve root sheath and the epidural space, with no intravascular uptake. 9ml was wasted    Then, after repeated negative aspiration, a combination of Decadron 10 mg, 0.25% bupivacaine 2 ml, diluted with 3ml of normal saline was injected.     Hemostasis was achieved, the area was cleaned, and bandaids were placed when appropriate.  The patient tolerated the procedure well, and was taken to the recovery room.    Images were saved to PACS.    Post-procedure pain score: 6/10  Follow-up includes:   -f/u phone call in one week  -f/u with referring provider    Bubba Montgomery MD  [facility]

## 2022-03-28 ENCOUNTER — TELEPHONE (OUTPATIENT)
Dept: FAMILY MEDICINE | Facility: CLINIC | Age: 47
End: 2022-03-28

## 2022-03-28 ENCOUNTER — E-VISIT (OUTPATIENT)
Dept: FAMILY MEDICINE | Facility: CLINIC | Age: 47
End: 2022-03-28
Payer: MEDICARE

## 2022-03-28 DIAGNOSIS — M79.651 PAIN IN BOTH THIGHS: ICD-10-CM

## 2022-03-28 DIAGNOSIS — M79.652 PAIN IN BOTH THIGHS: ICD-10-CM

## 2022-03-28 DIAGNOSIS — M51.369 DDD (DEGENERATIVE DISC DISEASE), LUMBAR: ICD-10-CM

## 2022-03-28 PROCEDURE — 99421 OL DIG E/M SVC 5-10 MIN: CPT | Performed by: FAMILY MEDICINE

## 2022-03-29 NOTE — TELEPHONE ENCOUNTER
Please fax PHYSICAL THERAPY order to orthology sam [facility]arcia.     Orthology Fax: [phone]    PSK

## 2022-03-30 ENCOUNTER — NURSE TRIAGE (OUTPATIENT)
Dept: NURSING | Facility: CLINIC | Age: 47
End: 2022-03-30
Payer: MEDICARE

## 2022-03-30 NOTE — TELEPHONE ENCOUNTER
TRIAGE CALL     Patient calling had COVID shot 4ths 48 hrs ago  (Pt is immuno suppress)  Feeling dizzy, Since he had his 4th shoot.   all other side effect have subsided.  BP, O2 sat, and temp are within range/ per patient  Left arm - no pain or red area on injection site  Numbness or tingling None   Denies difficulty with breathing, chest pain,  fever,   Patient is able to speak in full long sentences without getting short of breath.    Pt has been drinking coffee and some tea. Recommended more water to help with common side effect of vaccine and to call back if worsen.    Per protocol, disposition to home care.  Care advise given and caller s questions were answered  Reminded we will be here 24/7 and can call back and ask to speak with a nurse  Patient verbalized understanding and agrees with plan.  Ariadna Llanes RN Nurse Triage Advisor 4:05 PM 3/30/2022    Reason for Disposition    COVID-19 vaccine, systemic reactions (e.g., fatigue, fever, muscle aches), questions about    Additional Information    Negative: [1] Difficulty breathing or swallowing AND [2] starts within 2 hours after injection    Negative: Sounds like a life-threatening emergency to the triager    Negative: [1] Symptoms of COVID-19 (e.g., cough, fever, SOB, or others) AND [2] within 14 days of EXPOSURE (close contact) with diagnosed or suspected COVID-19 patient    Negative: [1] Symptoms of COVID-19 (e.g., cough, fever, SOB, or others) AND [2] within 14 days of being at a crowded indoor or outdoor event (e.g., concert, festival, rally, wedding)    Negative: Typical COVID-19 symptoms (e.g., cough, difficulty breathing, loss of taste or smell, runny nose, sore throat) that are NOT expected from vaccine    Negative: [1] COVID-19 exposure AND [2] no symptoms, or symptoms not typical of COVID-19    Negative: Fever > 104 F (40 C)    Negative: Sounds like a severe, unusual reaction to the triager    Negative: [1] Redness or red streak around the  injection site AND [2] started > 48 hours after getting vaccine AND [3] fever    Negative: [1] Fever > 101 F (38.3 C) AND [2] age > 60 years AND [3] started > 48 hours after getting vaccine    Negative: [1] Fever > 100.0 F (37.8 C) AND [2] bedridden (e.g., nursing home patient, CVA, chronic illness, recovering from surgery) AND [3] started > 48 hours after getting vaccine    Negative: [1] Fever > 100.0 F (37.8 C) AND [2] diabetes mellitus or weak immune system (e.g., HIV positive, cancer chemo, splenectomy, organ transplant, chronic steroids) AND [3] started > 48 hours after getting vaccine    Negative: [1] Fever > 100.0 F (37.8 C) AND [2] present > 3 days (72 hours)    Negative: [1] Fever > 100.0 F (37.8 C) AND [2] healthcare worker    Negative: [1] Redness around the injection site AND [2] started > 48 hours after getting vaccine AND [3] no fever  (Exception: red area < 1 inch or 2.5 cm wide)    Negative: [1] Pain, tenderness, or swelling at the injection site AND [2] over 3 days (72 hours) since vaccine AND [3] getting worse    Negative: [1] Pain, tenderness, or swelling at the injection site AND [2] lasts > 7 days    Negative: [1] Lymph node swelling (i.e., armpit or neck on side of vaccine) AND [2] lasts > 3 weeks    Negative: [1] Requesting COVID-19 vaccine AND [2] healthcare worker (e.g., EMS first responders, doctors, nurses)    Negative: [1] Requesting COVID-19 vaccine AND [2] resident of a long-term care facility (e.g., nursing home)    Negative: Requesting COVID-19 vaccine    Protocols used: CORONAVIRUS (COVID-19) VACCINE QUESTIONS AND [id number] 1.18.2022

## 2022-03-31 ENCOUNTER — VIRTUAL VISIT (OUTPATIENT)
Dept: DERMATOLOGY | Facility: CLINIC | Age: 47
End: 2022-03-31
Payer: MEDICARE

## 2022-03-31 DIAGNOSIS — L73.2 HIDRADENITIS SUPPURATIVA: Primary | ICD-10-CM

## 2022-03-31 PROCEDURE — 99441 PR PHYSICIAN TELEPHONE EVALUATION 5-10 MIN: CPT | Mod: 95 | Performed by: DERMATOLOGY

## 2022-03-31 ASSESSMENT — PAIN SCALES - GENERAL: PAINLEVEL: NO PAIN (0)

## 2022-03-31 NOTE — LETTER
3/31/2022         RE: Joel Pineda  [id number] [place] [address]        Dear Colleague,    Thank you for referring your patient, Joel Pineda, to the [facility]. Please see a copy of my visit note below.    [facility] Micrographic Surgery Consult Note    Mar 31, 2022  Start time (if telephone encounter): 11:00 a.m.  End time (if telephone encounter): 11:10 a.m.    Dermatology Problem List:  1. Immunosuppresion for ankylosing spondylitis, on Enbrel.  2. Acne vulgaris s/p Accutane in 1990's  3. Folliculitis  - BPO wash, Hibiclens, clobetasol for flares  4. HS - active lesion on the left buttock  - doxy 100mg BID x 3 months; BPO wash/Hibiclens; topical clindamycin    Subjective: The patient is a 46 year old man who presents today via telephone for dermatologic surgery consultation for hidradenitis suppurativa.    He says that he has a scar-like plaque with sinus tract formation at the left buttock. Onset was within the last one year. He has a history of ankylosing spondylitis and is on Enbrel for this. This was recently held due to recent COVID-19 vaccine administration. He is on doxycycline 100 mg BID and says that his skin lesion has calmed down somewhat while on that antibiotic. He continues with BPO wash alternated with Hibiclens in the shower, as well as topical clindamycin for flares (although has not needed to use it recently). He is interested in having the skin lesion removed with surgery.    No other associated symptoms, modifying factors, or prior treatments, except when noted above. The patient denies any constitutional symptoms, lymphadenopathy, unintentional weight loss or decreased appetite. No other skin concerns today.    Objective:   No photos submitted.    Assessment and Plan:     1. Hidradenitis suppurativa, clinically diagnosed:  - We reviewed that we typically perform an excision of the affected tissue and then allow the wound to heal by second intent to  reduce risk of recurrence. He says that he takes some prescription narcotics for baseline pain related to his ankylosing spondylitis and was concerned about pain control after surgery. We reviewed that we could discuss this on the day of his surgery but it would be reasonable to prescribe him something for acute post-op pain. He takes clonazepam sometimes for anxiety and says he will take some on the surgery day. He was reminded that he needs a designated  and to sign the consent form prior to taking the medication. We will plan to continue him on antibiotics after surgery while he has an open wound in an area that could become contaminated more easily. If excision of sinus tracts involves anus, then we will be judicious and may stop surgery and refer to colorectal surgery if case becomes too extensive but low suspicion for this presently based on his testimony. He is already scheduled for surgery.    The patient was discussed with and evaluated by attending physician, Drake Tilley DO.    Brenton Orozco MD  Micrographic Surgery and Dermatologic Oncology (MSDO) Fellow    Staff Physician Comments:   I saw the photos and evaluated the patient with the fellow (Dr. Omar Orozco) and I agree with the assessment and plan. I was present for the key portions of the telephone call.    Drake Tilley DO    Department of Dermatology  [facility]: Phone: [phone], Fax:[phone]  [facility]: Phone: [phone], Fax: [phone]        Again, thank you for allowing me to participate in the care of your patient.        Sincerely,        Drake Tilley MD

## 2022-03-31 NOTE — NURSING NOTE
Joel Pineda's goals for this visit include:   Chief Complaint   Patient presents with     Derm Problem     Tito is having a consult for HS excision on left buttock       He requests these members of his care team be copied on today's visit information:     PCP: Ksenia Lyles    Referring Provider:  No referring provider defined for this encounter.    There were no vitals taken for this visit.    Do you need any medication refills at today's visit? No    Antonette Ndiaye LPN

## 2022-03-31 NOTE — PROGRESS NOTES
Mohs Micrographic Surgery Consult Note    Mar 31, 2022  Start time (if telephone encounter): 11:00 a.m.  End time (if telephone encounter): 11:10 a.m.    Dermatology Problem List:  1. Immunosuppresion for ankylosing spondylitis, on Enbrel.  2. Acne vulgaris s/p Accutane in 1990's  3. Folliculitis  - BPO wash, Hibiclens, clobetasol for flares  4. HS - active lesion on the left buttock  - doxy 100mg BID x 3 months; BPO wash/Hibiclens; topical clindamycin    Subjective: The patient is a 46 year old man who presents today via telephone for dermatologic surgery consultation for hidradenitis suppurativa.    He says that he has a scar-like plaque with sinus tract formation at the left buttock. Onset was within the last one year. He has a history of ankylosing spondylitis and is on Enbrel for this. This was recently held due to recent COVID-19 vaccine administration. He is on doxycycline 100 mg BID and says that his skin lesion has calmed down somewhat while on that antibiotic. He continues with BPO wash alternated with Hibiclens in the shower, as well as topical clindamycin for flares (although has not needed to use it recently). He is interested in having the skin lesion removed with surgery.    No other associated symptoms, modifying factors, or prior treatments, except when noted above. The patient denies any constitutional symptoms, lymphadenopathy, unintentional weight loss or decreased appetite. No other skin concerns today.    Objective:   No photos submitted.    Assessment and Plan:     1. Hidradenitis suppurativa, clinically diagnosed:  - We reviewed that we typically perform an excision of the affected tissue and then allow the wound to heal by second intent to reduce risk of recurrence. He says that he takes some prescription narcotics for baseline pain related to his ankylosing spondylitis and was concerned about pain control after surgery. We reviewed that we could discuss this on the day of his surgery but it  would be reasonable to prescribe him something for acute post-op pain. He takes clonazepam sometimes for anxiety and says he will take some on the surgery day. He was reminded that he needs a designated  and to sign the consent form prior to taking the medication. We will plan to continue him on antibiotics after surgery while he has an open wound in an area that could become contaminated more easily. If excision of sinus tracts involves anus, then we will be judicious and may stop surgery and refer to colorectal surgery if case becomes too extensive but low suspicion for this presently based on his testimony. He is already scheduled for surgery.    The patient was discussed with and evaluated by attending physician, Drake Tilley DO.    Brenton Orozco MD  Micrographic Surgery and Dermatologic Oncology (MSDO) Fellow    Staff Physician Comments:   I saw the photos and evaluated the patient with the fellow (Dr. Omar Orozco) and I agree with the assessment and plan. I was present for the key portions of the telephone call.    Drake Tilley DO    Department of Dermatology  [facility]: Phone: [phone], Fax:[phone]  [facility]: Phone: [phone], Fax: [phone]

## 2022-04-03 ENCOUNTER — MYC MEDICAL ADVICE (OUTPATIENT)
Dept: FAMILY MEDICINE | Facility: CLINIC | Age: 47
End: 2022-04-03
Payer: MEDICARE

## 2022-04-03 DIAGNOSIS — M45.8 ANKYLOSING SPONDYLITIS OF SACRAL REGION (H): ICD-10-CM

## 2022-04-03 DIAGNOSIS — M51.369 DDD (DEGENERATIVE DISC DISEASE), LUMBAR: ICD-10-CM

## 2022-04-04 NOTE — PROGRESS NOTES
DERMATOLOGY EXCISION PROCEDURE NOTE    Dermatology Problem List:  1. Immunosuppresion for ankylosing spondylitis, on Enbrel.  2. Acne vulgaris s/p Accutane in 1990's  3. Folliculitis  - BPO wash, Hibiclens, clobetasol for flares  4. HS - active lesion on the left buttock, excision 4/7/22  - doxy 100mg BID x 3 months; BPO wash/Hibiclens; topical clindamycin    NAME OF PROCEDURE: Excision with second intention healing  Staff surgeon: Drake Tilley DO  Fellow Doctor: Dr. Brenton Orozco  Scrub Nurse: Antonette Ndiaye LPN    PRE-OPERATIVE DIAGNOSIS: HS   POST-OPERATIVE DIAGNOSIS: same   FINAL EXCISION SIZE(DEFECT SIZE): 2.9 x 1.9 cm, with 2 mm margin   FINAL REPAIR LENGTH: N/a cm      INDICATIONS: This patient presented with a 2.5 x 1.5 cm HS of the left buttock. Excision was indicated. We discussed the principles of treatment and most likely complications including scarring, bleeding, infection, incomplete excision, wound dehiscence, pain, nerve damage, and recurrence. Informed consent was obtained and the patient underwent the procedure as follows:    PROCEDURE: The patient was taken to the operative suite. Time-out was performed.  The treatment area was anesthetized with 1% lidocaine and epinephrine (1:100,000). The area was prepped with Chlorhexidine and rinsed with sterile saline and draped with sterile towels. The lesion was delineated and excised down to subcutaneous fat in a elliptical manner. Hemostasis was obtained by electrocoagulation.     REPAIR:   The patient is status post excision surgery.  The surgical site was examined with attention to normal anatomic and functional relationships.  After consideration and discussion of multiple options with the patient, it was determined that healing by second intention was preferred by the patient as he hoped to avoid HS recurrence. The patient verbalized understanding and agrees with this plan. It is also understood that should second intention healing be sub-optimal,  additional procedures such as scar revision, steroid injection or dermabrasion may be recommended.     The open wound was cleaned with hibiclens, and a thick layer of ointment was applied.  A pressure dressing consisting of non-adherent gauze, gauze and hypafix was applied. Wound care was discussed with patient both orally and in writing. The patient stated understanding and agreement of the course of care. He was discharged from the surgical suite in good condition.      He will return for evaluation of his wound in 2 weeks.      Follow-up as needed for wound evaluation.       Anatomic Pathology Results: pending    Staff Involved:    Scribe Disclosure  I, Rodri Magallon, am serving as a scribe to document services personally performed by Dr. Drake Tilley DO, based on data collection and the provider's statements to me.     Staff Physician Comments:   I saw and evaluated the patient with the Mohs Surgery Fellow (Dr. Omar Orozco) and I agree with the assessment and plan and the above description of the procedure as documented by the scribe. I was present for the key portions of the procedure and entire exam.    I was immediately available in the clinic throughout the procedures.       Drake Tilley DO    Department of Dermatology  [facility]: Phone: [phone], Fax:[phone]  [facility]: Phone: [phone], Fax: [phone]

## 2022-04-05 RX ORDER — METHOCARBAMOL 500 MG/1
TABLET, FILM COATED ORAL
Qty: 240 TABLET | Refills: 0 | Status: SHIPPED | OUTPATIENT
Start: 2022-04-05 | End: 2022-05-17

## 2022-04-05 RX ORDER — NABUMETONE 500 MG/1
TABLET, FILM COATED ORAL
Qty: 120 TABLET | Refills: 0 | Status: SHIPPED | OUTPATIENT
Start: 2022-04-05 | End: 2022-05-18

## 2022-04-05 NOTE — TELEPHONE ENCOUNTER
Routing refill request to provider for review/approval because:  Drug not on the [facility] [place]                                   nabumetone (RELAFEN) 500 MG tablet [Pharmacy Med Name: Nabumetone Oral Tablet 500 MG] 120 tablet 0     Sig: TAKE 1-2 TABLETS BY MOUTH TWICE DAILY WITH FOOD AS NEEDED FOR MODERATE PAIN        NSAID Medications Failed - 4/3/2022  3:47 PM        Failed - Blood pressure under 140/90 in past 12 months       BP Readings from Last 3 Encounters:   03/15/22 (!) 144/88   03/08/22 124/87   02/15/22 126/84                 Passed - Normal ALT on file in past 12 months     Recent Labs   Lab Test 11/10/21  0930   ALT 38               Passed - Normal AST on file in past 12 months       Recent Labs   Lab Test 11/10/21  0930   AST 40

## 2022-04-06 NOTE — TELEPHONE ENCOUNTER
Form and med list faxed to [phone] and placed in abstraction bin for scanning into patients chart

## 2022-04-07 ENCOUNTER — OFFICE VISIT (OUTPATIENT)
Dept: DERMATOLOGY | Facility: CLINIC | Age: 47
End: 2022-04-07
Payer: MEDICARE

## 2022-04-07 VITALS — HEART RATE: 71 BPM | SYSTOLIC BLOOD PRESSURE: 116 MMHG | DIASTOLIC BLOOD PRESSURE: 76 MMHG

## 2022-04-07 DIAGNOSIS — L73.2 HIDRADENITIS SUPPURATIVA: Primary | ICD-10-CM

## 2022-04-07 DIAGNOSIS — D48.5 NEOPLASM OF UNCERTAIN BEHAVIOR OF SKIN: ICD-10-CM

## 2022-04-07 PROCEDURE — 11470 EXC SKN H/P/P/U SMPL/NTRM: CPT | Mod: GC | Performed by: DERMATOLOGY

## 2022-04-07 PROCEDURE — 88305 TISSUE EXAM BY PATHOLOGIST: CPT | Performed by: DERMATOLOGY

## 2022-04-07 RX ORDER — HYDROCODONE BITARTRATE AND ACETAMINOPHEN 5; 325 MG/1; MG/1
1 TABLET ORAL EVERY 6 HOURS PRN
Qty: 6 TABLET | Refills: 0 | Status: SHIPPED | OUTPATIENT
Start: 2022-04-07 | End: 2022-04-10

## 2022-04-07 ASSESSMENT — PAIN SCALES - GENERAL: PAINLEVEL: NO PAIN (0)

## 2022-04-07 NOTE — LETTER
4/7/2022         RE: Joel Pineda  [address] [place] [address]        Dear Colleague,    Thank you for referring your patient, Joel Pineda, to the [facility]. Please see a copy of my visit note below.      DERMATOLOGY EXCISION PROCEDURE NOTE    Dermatology Problem List:  1. Immunosuppresion for ankylosing spondylitis, on Enbrel.  2. Acne vulgaris s/p Accutane in 1990's  3. Folliculitis  - BPO wash, Hibiclens, clobetasol for flares  4. HS - active lesion on the left buttock, excision 4/7/22  - doxy 100mg BID x 3 months; BPO wash/Hibiclens; topical clindamycin    NAME OF PROCEDURE: Excision with second intention healing  Staff surgeon: Drake Tilley DO  Fellow Doctor: Dr. Brenton Orozco  Scrub Nurse: Antonette Ndiaye LPN    PRE-OPERATIVE DIAGNOSIS: HS   POST-OPERATIVE DIAGNOSIS: same   FINAL EXCISION SIZE(DEFECT SIZE): 2.9 x 1.9 cm, with 2 mm margin   FINAL REPAIR LENGTH: N/a cm      INDICATIONS: This patient presented with a 2.5 x 1.5 cm HS of the left buttock. Excision was indicated. We discussed the principles of treatment and most likely complications including scarring, bleeding, infection, incomplete excision, wound dehiscence, pain, nerve damage, and recurrence. Informed consent was obtained and the patient underwent the procedure as follows:    PROCEDURE: The patient was taken to the operative suite. Time-out was performed.  The treatment area was anesthetized with 1% lidocaine and epinephrine (1:100,000). The area was prepped with Chlorhexidine and rinsed with sterile saline and draped with sterile towels. The lesion was delineated and excised down to subcutaneous fat in a elliptical manner. Hemostasis was obtained by electrocoagulation.     REPAIR:   The patient is status post excision surgery.  The surgical site was examined with attention to normal anatomic and functional relationships.  After consideration and discussion of multiple options with the patient, it  was determined that healing by second intention was preferred by the patient as he hoped to avoid HS recurrence. The patient verbalized understanding and agrees with this plan. It is also understood that should second intention healing be sub-optimal, additional procedures such as scar revision, steroid injection or dermabrasion may be recommended.     The open wound was cleaned with hibiclens, and a thick layer of ointment was applied.  A pressure dressing consisting of non-adherent gauze, gauze and hypafix was applied. Wound care was discussed with patient both orally and in writing. The patient stated understanding and agreement of the course of care. He was discharged from the surgical suite in good condition.      He will return for evaluation of his wound in 2 weeks.      Follow-up as needed for wound evaluation.       Anatomic Pathology Results: pending    Staff Involved:    Scribe Disclosure  I, Rodri Magallon, am serving as a scribe to document services personally performed by Dr. Drake Tilley DO, based on data collection and the provider's statements to me.     Staff Physician Comments:   I saw and evaluated the patient with the Mohs Surgery Fellow (Dr. Omar Orozco) and I agree with the assessment and plan and the above description of the procedure as documented by the scribe. I was present for the key portions of the procedure and entire exam.    I was immediately available in the clinic throughout the procedures.       Drake Tilley DO    Department of Dermatology  [facility]: Phone: [phone], Fax:[phone]  [facility]: Phone: [phone], Fax: [phone]                    Again, thank you for allowing me to participate in the care of your patient.        Sincerely,        Drake Tilley MD

## 2022-04-07 NOTE — NURSING NOTE
Joel Pineda's goals for this visit include:   Chief Complaint   Patient presents with     Derm Problem     Tito is here today for excision on left buttock due to HS       He requests these members of his care team be copied on today's visit information:     PCP: Ksenia Lyles    Referring Provider:  No referring provider defined for this encounter.    /76   Pulse 71     Do you need any medication refills at today's visit? No    Antonette Ndiaye LPN

## 2022-04-07 NOTE — PATIENT INSTRUCTIONS
"Post-Operative Care for Granulating Site  After your surgery, a pressure bandage will be placed over the area. This will help prevent bleeding. Please follow these instructions as they will help you to prevent complications as your wound heals.  For the First 48 hours After Surgery:  1. Leave the pressure bandage on and keep it dry. If it should come loose, you may retape it, but do not take it off.  2. Relax and take it easy. Do not do any vigorous exercise, heavy lifting, or bending forward. This could cause the wound to bleed.  3. Post-operative pain is usually mild. You may take plain or extra strength Tylenol every 4 hours as needed. Do not take any medicine that contains aspirin, ibuprofen or motrin.  Avoid alcohol and vitamin E as these may increase your tendency to bleed.  4. You may put an ice pack around the bandaged area for 20 minutes every 2-3 hours. This may help reduce swelling, bruising, and pain. Make sure the ice pack is waterproof so that the pressure bandage does not get wet.   5. You may see a small amount of drainage or blood on your pressure bandage. This is normal. However, if drainage or bleeding continues or saturates the bandage, you will need to apply firm pressure over the bandage with a washcloth for 15 minutes. If bleeding continues after applying pressure for 15 minutes, apply an ice pack to the bandaged area for 15 minutes. If bleeding still continues, go to the nearest emergency room.  48 Hours After Surgery:  Carefully remove the bandage and start daily wound care and dressing changes. You may also now shower and get the wound wet. Use caution when washing the wound, be gentle.  Do not use a wash cloth on the wound.  Daily Wound Care:    Wash with mild soap and water every day until wound appears well-healed.  Rinse well and pat dry.    Apply Vaseline over site with a Q-tip and re-apply a dressing until wound appears well healed.  A well healed wound is \"pinked over\" with a shiny " "surface.  When area is \"pinked over\" it is no longer necessary to apply Vaseline.  Call Us If:    You have pain that is not controlled with Tylenol.    You have signs or symptoms of an infection, such as: fever over 100 degrees F, redness, warmth, or foul-smelling or yellow/creamy drainage from the wound.  Who should I call with questions?    [facility]: [phone]    [facility]: [phone]    For urgent needs outside of business hours call the [facility] at [phone] and ask for the dermatology resident on call        "

## 2022-04-08 ENCOUNTER — MYC REFILL (OUTPATIENT)
Dept: FAMILY MEDICINE | Facility: CLINIC | Age: 47
End: 2022-04-08
Payer: MEDICARE

## 2022-04-08 ENCOUNTER — TELEPHONE (OUTPATIENT)
Dept: PALLIATIVE MEDICINE | Facility: CLINIC | Age: 47
End: 2022-04-08
Payer: MEDICARE

## 2022-04-08 DIAGNOSIS — M45.8 ANKYLOSING SPONDYLITIS OF SACRAL REGION (H): ICD-10-CM

## 2022-04-08 DIAGNOSIS — M51.369 DDD (DEGENERATIVE DISC DISEASE), LUMBAR: ICD-10-CM

## 2022-04-08 DIAGNOSIS — G57.10 MERALGIA PARESTHETICA, UNSPECIFIED LATERALITY: Primary | ICD-10-CM

## 2022-04-08 RX ORDER — OXYCODONE HYDROCHLORIDE 5 MG/1
5 TABLET ORAL EVERY 6 HOURS PRN
Qty: 35 TABLET | Refills: 0 | Status: SHIPPED | OUTPATIENT
Start: 2022-04-08 | End: 2022-04-29

## 2022-04-08 NOTE — TELEPHONE ENCOUNTER
Per pt the last injection he had did not work, he had discussed having a Nerve Block with Valentina and wants to have orders placed.       Nguyen Chow    [facility] [place]

## 2022-04-08 NOTE — TELEPHONE ENCOUNTER
Routing refill request to provider for review/approval because:  Drug not on the [facility] refill protocol.    Requested Prescriptions   Pending Prescriptions Disp Refills    oxyCODONE (ROXICODONE) 5 MG tablet 35 tablet 0     Sig: Take 1 tablet (5 mg) by mouth every 6 hours as needed for pain (maximum 6 tablet(s) per day)        There is no refill protocol information for this order         Homa Tipton RN, BSN  [facility]

## 2022-04-09 ENCOUNTER — TELEPHONE (OUTPATIENT)
Dept: DERMATOLOGY | Facility: CLINIC | Age: 47
End: 2022-04-09
Payer: MEDICARE

## 2022-04-09 NOTE — TELEPHONE ENCOUNTER
"Received page as BELLA from patient regarding itchy bumps on chest/abdomen that started yesterday evening & dry \"crepe-y old-person skin\" on arms recently. Of note, patient has history of folliculitis (on doxy since 2/22/2022) and HS & was recently seen by Dr. Tilley 4/7/2022 for excision of HS lesion on left buttock. Patient was wondering if the lesions could be related to his doxycycline or the wet wipes he started using yesterday since he wasn't supposed to shower for the next few days.    Overall, patient's description is most consistent with his known history of folliculitis as he stated small white fluid-filled pustules started around his sternum yesterday. Low suspicion for drug rash caused by doxycycline given timeline, localized area, no systemic symptoms, no facial swelling, and patient otherwise feeling well. Possibly could be related to the wet wipes he started using yesterday. Patient will start showering either tonight or tomorrow so recommended d/c wet wipes & restart Hibiclens in the shower. Also recommended topical clinda gel BID & triamcinolone 0.1% cream PRN pruritus which he has at home.     For his arms, patient describes flaky dry skin. He thinks it could just be the dry air and since he hasn't used his humidifier lately. Recommended that patient use his Cetaphil moisturizer several times a day.    Follow-up as planned with Dr. Tilley (4/20/22) and Dr. Gonzales (4/26/22). Instructed to call back or go to urgent care / ED if severely worsening symptoms such as fever/chills, facial edema, widespread involvement of rash, or anaphylactic signs/symptoms (patient does have epi-pen at home).  "

## 2022-04-11 ENCOUNTER — OFFICE VISIT (OUTPATIENT)
Dept: DERMATOLOGY | Facility: CLINIC | Age: 47
End: 2022-04-11
Payer: MEDICARE

## 2022-04-11 DIAGNOSIS — L73.2 HIDRADENITIS SUPPURATIVA: Primary | ICD-10-CM

## 2022-04-11 LAB
PATH REPORT.COMMENTS IMP SPEC: NORMAL
PATH REPORT.FINAL DX SPEC: NORMAL
PATH REPORT.GROSS SPEC: NORMAL
PATH REPORT.MICROSCOPIC SPEC OTHER STN: NORMAL
PATH REPORT.RELEVANT HX SPEC: NORMAL

## 2022-04-11 PROCEDURE — 99024 POSTOP FOLLOW-UP VISIT: CPT | Performed by: DERMATOLOGY

## 2022-04-11 ASSESSMENT — PAIN SCALES - GENERAL: PAINLEVEL: MILD PAIN (2)

## 2022-04-11 NOTE — NURSING NOTE
Joel Pineda's goals for this visit include:   Chief Complaint   Patient presents with     Wound Check     C/o increase pain and drainage pain 8/10  with showering currently taking Doxy twice daily        He requests these members of his care team be copied on today's visit information:     PCP: Ksenia Lyles    Referring Provider:  No referring provider defined for this encounter.    There were no vitals taken for this visit.    Do you need any medication refills at today's visit? Virginia Blankenship LPN

## 2022-04-11 NOTE — LETTER
4/11/2022         RE: Joel Pineda  [address] [place] [address]        Dear Colleague,    Thank you for referring your patient, Joel Pineda, to the [facility]. Please see a copy of my visit note below.    [facility] Dermatology Note  Encounter Date: Apr 11, 2022  Office Visit     Dermatology Problem List:  1. Immunosuppresion for ankylosing spondylitis, on Enbrel.  2. Acne vulgaris s/p Accutane in 1990's  3. Folliculitis  - BPO wash, Hibiclens, clobetasol for flares  4. HS - active lesion on the left buttock, excision 4/7/22  - doxy 100mg BID x 3 months; BPO wash/Hibiclens; topical clindamycin    ____________________________________________    Assessment & Plan:    #  Hidradenitis suppurativa - left buttock s/p excision 4/7/22. Wound appeared to be healing well on exam today. No excess or yellow discharge today.   - Photograph was obtained for clinical monitoring and inclusion in medical record.   - Wound was cleaned and petroleum jelly and dressings were applied.   - Continue Doxycycline 100 mg BID   - Continue Hibiclens BID     Procedures Performed:   None    Follow-up: with Dr. Tilley in 10 days.     Staff and Scribe:     Scribe Disclosure:   I, Rodri Magallon, am serving as a scribe to document services personally performed by this physician, Dr. Drake Tilley, based on data collection and the provider's statements to me.     Provider Disclosure:   The documentation recorded by the scribe accurately reflects the services I personally performed and the decisions made by me.    Drake Tilley DO    Department of Dermatology  [facility]: Phone: [phone], Fax:[phone]  [facility]: Phone: [phone], Fax: [phone]    ____________________________________________    CC: Wound Check (C/o increase pain and drainage pain  8/10  with showering currently taking Doxy twice daily )    HPI:  Mr. Joel Pineda is a(n) 46 year old male who presents today as a return patient for a wound check.     Last seen on 4/7/22 for an excision of HS on the left buttock.     Today, patient notes an increase in pain and drainage pain on a scale of 8/10 on the left buttock with showering. The pain is affecting his sleep. He is currently taking doxycycline BID. Notes discharge was yellowish in color yesterday.    He notes there is an area that is darker on the wound.     Patient is otherwise feeling well, without additional skin concerns.    Labs Reviewed:  N/A    Physical Exam:  Vitals: There were no vitals taken for this visit.  SKIN: Focused examination of the left buttock was performed.  - Oval surgical ulcer with granualtion tissues in the base and yellow fibrinous leaping, adjacent skin with minimal erythma and edema , minimal tenderness to palpation touching  - No other lesions of concern on areas examined.     Medications:  Current Outpatient Medications   Medication     acetaminophen 500 MG CAPS     albuterol (PROAIR HFA/PROVENTIL HFA/VENTOLIN HFA) 108 (90 Base) MCG/ACT inhaler     aspirin (ASA) 81 MG tablet     cetirizine (ZYRTEC) 10 MG tablet     cholecalciferol (D3-50) 1250 mcg (20159 units) capsule     clindamycin (CLINDAMAX) 1 % external gel     clonazePAM (KLONOPIN) 0.5 MG tablet     cyanocobalamin (CYANOCOBALAMIN) 1000 MCG/ML injection     doxycycline monohydrate (MONODOX) 100 MG capsule     dronabinol (MARINOL) 5 MG capsule     DULoxetine (CYMBALTA) 60 MG capsule     EPINEPHrine (ANY BX GENERIC EQUIV) 0.3 MG/0.3ML injection 2-pack     ergocalciferol (ERGOCALCIFEROL) 1.25 MG (24816 UT) capsule     eszopiclone (LUNESTA) 3 MG tablet     etanercept (ENBREL SURECLICK) 50 MG/ML autoinjector     famotidine (PEPCID) 20 MG tablet     fenofibrate (TRICOR) 48 MG tablet     fluticasone (FLONASE) 50 MCG/ACT nasal spray     fluticasone-salmeterol  "(ADVAIR) 250-50 MCG/DOSE inhaler     levothyroxine (SYNTHROID/LEVOTHROID) 75 MCG tablet     lidocaine (XYLOCAINE) 5 % external ointment     melatonin 3 MG tablet     methocarbamol (ROBAXIN) 500 MG tablet     metoclopramide (REGLAN) 5 MG tablet     metoprolol succinate ER (TOPROL-XL) 200 MG 24 hr tablet     montelukast (SINGULAIR) 10 MG tablet     nabumetone (RELAFEN) 500 MG tablet     naloxone (NARCAN) 4 MG/0.1ML nasal spray     olopatadine (PATADAY) 0.2 % ophthalmic solution     omega-3 acid ethyl esters (LOVAZA) 1 g capsule     omeprazole 20 MG tablet     ondansetron (ZOFRAN-ODT) 8 MG ODT tab     order for DME     order for DME     oxyCODONE (ROXICODONE) 5 MG tablet     pregabalin (LYRICA) 150 MG capsule     pyridostigmine (MESTINON) 60 MG tablet     ramipril (ALTACE) 10 MG capsule     risperiDONE (RISPERDAL) 0.5 MG tablet     rizatriptan (MAXALT-MLT) 5 MG ODT     rosuvastatin (CRESTOR) 40 MG tablet     syringe, disposable, (BD TUBERCULIN SYRINGE) 1 ML MISC     syringe/needle, disp, (BD INTEGRA SYRINGE) 25G X 1\" 3 ML MISC     valACYclovir (VALTREX) 500 MG tablet     vitamin B complex with vitamin C (STRESS TAB) tablet     ZINC SULFATE-VITAMIN C MT     No current facility-administered medications for this visit.      Past Medical History:   Patient Active Problem List   Diagnosis     Intermittent asthma     Hyperlipidemia LDL goal <100     Chronic nonallergic rhinitis     Diverticulosis     GERD (gastroesophageal reflux disease)     Generalized anxiety disorder     LLOYD (obstructive sleep apnea)- mild (AHI 11)     Intracranial arachnoid cyst     Facet arthritis of cervical region     Acquired hypothyroidism     Bipolar 2 disorder (H)     Chronic midline low back pain without sciatica     Irritable bowel syndrome with diarrhea     B12 deficiency     Essential hypertension with goal blood pressure less than 140/90     Chronic, continuous use of opioids     Ankylosing spondylitis of sacral region (H)     Morbid obesity " due to hypertriglyceridemia (H)     Fatty infiltration of liver     DDD (degenerative disc disease), lumbar     Type 2 diabetes mellitus with complication, without long-term current use of insulin (H)     Peripheral polyneuropathy     History of pulmonary embolism     Ingrown toenail     Hypertriglyceridemia     Gastroparesis     Orthostatic dizziness     POTS (postural orthostatic tachycardia syndrome)     PHN (postherpetic neuralgia)     Dysautonomia (H)     Chronic pain syndrome     Borderline personality disorder (H)     MDD (major depressive disorder), recurrent severe, without psychosis (H)     Folliculitis     Immunosuppression (H)     Past Medical History:   Diagnosis Date     Acne      Acquired hypothyroidism      Allergic state      Ankylosing spondylitis lumbar region (H)      Ankylosing spondylitis of sacral region (H)      Anxiety      Bipolar 2 disorder (H)      Chest pain     Chest pain, regulated w/BP meds. Clear arteries.     Chronic pain      DDD (degenerative disc disease), lumbar      Depressive disorder      Diabetes (H)      Diverticulosis      Facet arthritis of cervical region      Gastroesophageal reflux disease      Hypertension      IBS (irritable bowel syndrome)      Intracranial arachnoid cyst      Major depressive disorder, recurrent episode (H)     Multiple psych providers - they manage meds August 2015: Provigil induced severe mood dis-function      Major depressive disorder, recurrent episode, severe (H) 12/2/2020     MDD (major depressive disorder), recurrent severe, without psychosis (H) 7/21/2021     LLOYD (obstructive sleep apnea)- mild (AHI 11)      Polyneuropathy      Pulmonary embolism (H)      Skin exam, screening for cancer 12/3/2013     Sleep apnea      Uncomplicated asthma             Again, thank you for allowing me to participate in the care of your patient.        Sincerely,        Drake Tilley MD

## 2022-04-11 NOTE — PROGRESS NOTES
[facility] Dermatology Note  Encounter Date: Apr 11, 2022  Office Visit     Dermatology Problem List:  1. Immunosuppresion for ankylosing spondylitis, on Enbrel.  2. Acne vulgaris s/p Accutane in 1990's  3. Folliculitis  - BPO wash, Hibiclens, clobetasol for flares  4. HS - active lesion on the left buttock, excision 4/7/22  - doxy 100mg BID x 3 months; BPO wash/Hibiclens; topical clindamycin    ____________________________________________    Assessment & Plan:    #  Hidradenitis suppurativa - left buttock s/p excision 4/7/22. Wound appeared to be healing well on exam today. No excess or yellow discharge today.   - Photograph was obtained for clinical monitoring and inclusion in medical record.   - Wound was cleaned and petroleum jelly and dressings were applied.   - Continue Doxycycline 100 mg BID   - Continue Hibiclens BID     Procedures Performed:   None    Follow-up: with Dr. Tilley in 10 days.     Staff and Scribe:     Scribe Disclosure:   Rodri DEVRIES, am serving as a scribe to document services personally performed by this physician, Dr. Drake Tilley, based on data collection and the provider's statements to me.     Provider Disclosure:   The documentation recorded by the scribe accurately reflects the services I personally performed and the decisions made by me.    Drake Tilley DO    Department of Dermatology  [facility]: Phone: [phone], Fax:[phone]  [facility]: Phone: [phone], Fax: [phone]    ____________________________________________    CC: Wound Check (C/o increase pain and drainage pain 8/10  with showering currently taking Doxy twice daily )    HPI:  Mr. Joel Pineda is a(n) 46 year old male who presents today as a return patient for a wound check.     Last seen on 4/7/22 for an excision of HS on the left buttock.     Today, patient  notes an increase in pain and drainage pain on a scale of 8/10 on the left buttock with showering. The pain is affecting his sleep. He is currently taking doxycycline BID. Notes discharge was yellowish in color yesterday.    He notes there is an area that is darker on the wound.     Patient is otherwise feeling well, without additional skin concerns.    Labs Reviewed:  N/A    Physical Exam:  Vitals: There were no vitals taken for this visit.  SKIN: Focused examination of the left buttock was performed.  - Oval surgical ulcer with granualtion tissues in the base and yellow fibrinous leaping, adjacent skin with minimal erythma and edema , minimal tenderness to palpation touching  - No other lesions of concern on areas examined.     Medications:  Current Outpatient Medications   Medication     acetaminophen 500 MG CAPS     albuterol (PROAIR HFA/PROVENTIL HFA/VENTOLIN HFA) 108 (90 Base) MCG/ACT inhaler     aspirin (ASA) 81 MG tablet     cetirizine (ZYRTEC) 10 MG tablet     cholecalciferol (D3-50) 1250 mcg (02749 units) capsule     clindamycin (CLINDAMAX) 1 % external gel     clonazePAM (KLONOPIN) 0.5 MG tablet     cyanocobalamin (CYANOCOBALAMIN) 1000 MCG/ML injection     doxycycline monohydrate (MONODOX) 100 MG capsule     dronabinol (MARINOL) 5 MG capsule     DULoxetine (CYMBALTA) 60 MG capsule     EPINEPHrine (ANY BX GENERIC EQUIV) 0.3 MG/0.3ML injection 2-pack     ergocalciferol (ERGOCALCIFEROL) 1.25 MG (56277 UT) capsule     eszopiclone (LUNESTA) 3 MG tablet     etanercept (ENBREL SURECLICK) 50 MG/ML autoinjector     famotidine (PEPCID) 20 MG tablet     fenofibrate (TRICOR) 48 MG tablet     fluticasone (FLONASE) 50 MCG/ACT nasal spray     fluticasone-salmeterol (ADVAIR) 250-50 MCG/DOSE inhaler     levothyroxine (SYNTHROID/LEVOTHROID) 75 MCG tablet     lidocaine (XYLOCAINE) 5 % external ointment     melatonin 3 MG tablet     methocarbamol (ROBAXIN) 500 MG tablet     metoclopramide (REGLAN) 5 MG tablet     metoprolol  "succinate ER (TOPROL-XL) 200 MG 24 hr tablet     montelukast (SINGULAIR) 10 MG tablet     nabumetone (RELAFEN) 500 MG tablet     naloxone (NARCAN) 4 MG/0.1ML nasal spray     olopatadine (PATADAY) 0.2 % ophthalmic solution     omega-3 acid ethyl esters (LOVAZA) 1 g capsule     omeprazole 20 MG tablet     ondansetron (ZOFRAN-ODT) 8 MG ODT tab     order for DME     order for DME     oxyCODONE (ROXICODONE) 5 MG tablet     pregabalin (LYRICA) 150 MG capsule     pyridostigmine (MESTINON) 60 MG tablet     ramipril (ALTACE) 10 MG capsule     risperiDONE (RISPERDAL) 0.5 MG tablet     rizatriptan (MAXALT-MLT) 5 MG ODT     rosuvastatin (CRESTOR) 40 MG tablet     syringe, disposable, (BD TUBERCULIN SYRINGE) 1 ML MISC     syringe/needle, disp, (BD INTEGRA SYRINGE) 25G X 1\" 3 ML MISC     valACYclovir (VALTREX) 500 MG tablet     vitamin B complex with vitamin C (STRESS TAB) tablet     ZINC SULFATE-VITAMIN C MT     No current facility-administered medications for this visit.      Past Medical History:   Patient Active Problem List   Diagnosis     Intermittent asthma     Hyperlipidemia LDL goal <100     Chronic nonallergic rhinitis     Diverticulosis     GERD (gastroesophageal reflux disease)     Generalized anxiety disorder     LLOYD (obstructive sleep apnea)- mild (AHI 11)     Intracranial arachnoid cyst     Facet arthritis of cervical region     Acquired hypothyroidism     Bipolar 2 disorder (H)     Chronic midline low back pain without sciatica     Irritable bowel syndrome with diarrhea     B12 deficiency     Essential hypertension with goal blood pressure less than 140/90     Chronic, continuous use of opioids     Ankylosing spondylitis of sacral region (H)     Morbid obesity due to hypertriglyceridemia (H)     Fatty infiltration of liver     DDD (degenerative disc disease), lumbar     Type 2 diabetes mellitus with complication, without long-term current use of insulin (H)     Peripheral polyneuropathy     History of pulmonary " embolism     Ingrown toenail     Hypertriglyceridemia     Gastroparesis     Orthostatic dizziness     POTS (postural orthostatic tachycardia syndrome)     PHN (postherpetic neuralgia)     Dysautonomia (H)     Chronic pain syndrome     Borderline personality disorder (H)     MDD (major depressive disorder), recurrent severe, without psychosis (H)     Folliculitis     Immunosuppression (H)     Past Medical History:   Diagnosis Date     Acne      Acquired hypothyroidism      Allergic state      Ankylosing spondylitis lumbar region (H)      Ankylosing spondylitis of sacral region (H)      Anxiety      Bipolar 2 disorder (H)      Chest pain     Chest pain, regulated w/BP meds. Clear arteries.     Chronic pain      DDD (degenerative disc disease), lumbar      Depressive disorder      Diabetes (H)      Diverticulosis      Facet arthritis of cervical region      Gastroesophageal reflux disease      Hypertension      IBS (irritable bowel syndrome)      Intracranial arachnoid cyst      Major depressive disorder, recurrent episode (H)     Multiple psych providers - they manage meds August 2015: Provigil induced severe mood dis-function      Major depressive disorder, recurrent episode, severe (H) 12/2/2020     MDD (major depressive disorder), recurrent severe, without psychosis (H) 7/21/2021     LLOYD (obstructive sleep apnea)- mild (AHI 11)      Polyneuropathy      Pulmonary embolism (H)      Skin exam, screening for cancer 12/3/2013     Sleep apnea      Uncomplicated asthma

## 2022-04-12 ENCOUNTER — TELEPHONE (OUTPATIENT)
Dept: DERMATOLOGY | Facility: CLINIC | Age: 47
End: 2022-04-12
Payer: MEDICARE

## 2022-04-12 NOTE — TELEPHONE ENCOUNTER
Case Report   Surgical Pathology Report                         Case: [id number]                                   Authorizing Provider:  Drake Tilley MD         Collected:           04/07/2022 02:30 PM           Ordering Location:     [facility]   Received:            04/07/2022 04:48 PM                                  [place]                                                                   Pathologist:           Chico Ballard MD                                                          Specimen:    Skin, Left buttock                                                                         Final Diagnosis   A. Left buttock:  - Scar and multifocal ruptured folliculitis - (see comment)     Notified.  Sandy Menjivar CMA on 4/12/2022 at 2:31 PM

## 2022-04-12 NOTE — TELEPHONE ENCOUNTER
[facility] Call Center    Phone Message    May a detailed message be left on voicemail: yes     Reason for Call: Other: Pt called regardig a missed call from the clinic. Please call him back to discuss his pathology results. Thanks     Action Taken: Message routed to:  Adult Clinics: Dermatology p [id number]    Travel Screening: Not Applicable

## 2022-04-12 NOTE — TELEPHONE ENCOUNTER
Injection patient only     Routing to Dr. Bubba Montgomery to make recommendations on Nerve Block discussed with patient, no mention in procedure note from 3/15/22    Please route to referring provider Neurologist Dr Gallagher to sign orders    Adriana Germain RN, BSN, CMSRN  RN Care Coordinator  [facility]

## 2022-04-13 NOTE — TELEPHONE ENCOUNTER
Please reach out to pt. Is pt interested in repeating his RFA given has been 11 month. Vs I believe we discussed the option of trying an ILESI approach.

## 2022-04-13 NOTE — TELEPHONE ENCOUNTER
Pt calling to follow up on schedule injection.      Nguyen Chow    [facility] [place]

## 2022-04-13 NOTE — TELEPHONE ENCOUNTER
Called Tito Able to give message below, as written by provider    Tito is not sure what is the difference in the approach would be with ILESI. Tito states he discussed with Dr. Bubba Montgomery at last injection appointment  doing a Nerve Block Lateral Femoral Cutaneous Nerve he states would like to go that route vs repeating RFA     States PT is failing he states     Tito would like to establish care with Dr. Bubba Montgomery to have him manage injections/procures, therefore, scheduled new patient visit soonest available 5/11/22       Adriana Germain RN, BSN, CMSRN  RN Care Coordinator  [facility]

## 2022-04-13 NOTE — TELEPHONE ENCOUNTER
Pt scheduled tomorrow 4/14 @ 9:45p Ifeanyi.      Nguyen Chow    [facility] [place]

## 2022-04-14 ENCOUNTER — OFFICE VISIT (OUTPATIENT)
Dept: PALLIATIVE MEDICINE | Facility: CLINIC | Age: 47
End: 2022-04-14
Attending: PAIN MEDICINE
Payer: MEDICARE

## 2022-04-14 VITALS — HEART RATE: 72 BPM | DIASTOLIC BLOOD PRESSURE: 76 MMHG | SYSTOLIC BLOOD PRESSURE: 112 MMHG

## 2022-04-14 DIAGNOSIS — G57.10 MERALGIA PARESTHETICA, UNSPECIFIED LATERALITY: ICD-10-CM

## 2022-04-14 PROCEDURE — 76942 ECHO GUIDE FOR BIOPSY: CPT | Performed by: PAIN MEDICINE

## 2022-04-14 PROCEDURE — 64450 NJX AA&/STRD OTHER PN/BRANCH: CPT | Performed by: PAIN MEDICINE

## 2022-04-14 ASSESSMENT — PAIN SCALES - GENERAL: PAINLEVEL: MODERATE PAIN (4)

## 2022-04-14 NOTE — PATIENT INSTRUCTIONS
[facility]  Post Procedure Instructions    Today you had:  trigger point injections   occipital nerve block   bursa injection  Joint Injection    Medications used:  lidocaine   bupivicaine   kenolog   dexamethasone        Go to the emergency room if you develop any shortness of breath  Monitor the injection sites for signs and symptoms of infection-fever, chills, redness, swelling, warmth, or drainage to areas.  You may have soreness at injection sites for up to 24 hours.  It may take up to 14 days for the steroid medication to start working although you may feel the effect as early as a few days after the procedure.     You may apply ice to the painful areas to help minimize the discomfort of the needle pokes.  Do not apply heat to sites for at least 12 hours.  You may use anti-inflammatory medications or Tylenol for pain control if necessary    Pain Clinic phone number during work hours (Monday through Friday 8 am-4:30 pm) at [phone] or the Provider Line after hours at [phone]:

## 2022-04-18 NOTE — PROGRESS NOTES
Medication Therapy Management (MTM) Encounter    ASSESSMENT:                            Medication Adherence/Access: No issues identified    Mood/Anxiety/Insomnia: Stable, follows closely with psychiatry.    Pain: Stable.     Asthma: Stable.     Hyperlipidemia: Triglycerides are above goal, has labs scheduled for Thursday.    Ankylosing Spondylitis: Stable.    PLAN:                          No medication changes made today.    Follow-up: 3 months.    SUBJECTIVE/OBJECTIVE:                          Joel Pineda is a 46 year old male called for a follow up visit. He was referred to me from Ksenia Lyles. Follow up from 1/19/2022.     Reason for visit: Medication Review.    Allergies/ADRs: Reviewed in chart  Past Medical History: Reviewed in chart  Tobacco: He reports that he has never smoked. He has never used smokeless tobacco.  Alcohol: none  Other Substance Use: None  Caffeine: 6 cups a day  Activity: None    Medication Adherence/Access: no issues reported  The patient fills medications at [place]: NO, fills medications at [facility].     Mood/Anxiety/Insomnia: current therapy includes  duloxetine 120mg once daily, riserpidone 0.5mg twice daily, clonazepam 0.5mg twice daily (usually just taking at bedtime), Lunesta 3mg at bedtime,  melatonin 13mg at bedtime. Sleep has been ok, sometimes waking up early.     Started individual therapy. Patient's psychiatrist is Dr. Rodriguez at [facility] in [place].    Pain: current therapy includes APAP as needed-patient is taking  500 mg (1-2 tablets three times daily), lyrica 150mg two times daily, oxycodone 5-10mg every 6 hours as needed maximum of 6 tablets/day (usually takes 1-2 per day), methocarbamol 500mg-1000mg three times daily as needed (takes 2 tablets three times a day),  nabumetone 1000 twice daily, Narcan nasal spray as needed. Narcan is expiring in March. Methocarbamol has been helping and he has been able to decrease his use of topical  lidocaine.   Nerve block was about 80% effective.   Dr. Montgomery.    Asthma: Current medications: ICS/LABA- Advair 250-50 1 puff(s) twice daily  Montelukast (Singulair) once daily  Short-Acting Bronchodilator: Albuterol MDI. Patient rinses their mouth after using steroid inhaler. Triggers include: cold air and pollutants.  Patient reports the following symptoms: increased need of albuterol.  Asthma Action Plan on file: NO  ACT Total Scores 12/21/2020 6/9/2021 4/19/2022   ACT TOTAL SCORE - - -   ASTHMA ER VISITS - - -   ASTHMA HOSPITALIZATIONS - - -   ACT TOTAL SCORE (Goal Greater than or Equal to 20) 20 21 21   In the past 12 months, how many times did you visit the emergency room for your asthma without being admitted to the hospital? 0 0 0   In the past 12 months, how many times were you hospitalized overnight because of your asthma? 0 0 0     Hyperlipidemia: Current therapy includes rosuvstatin 20mg daily and Fenofibrate 48mg once daily, Lovaza 2grams twice daily.  Patient reports no significant myalgias or other side effects.  The 10-year ASCVD risk score (Vanna PHIPPS JrLopez, et al., 2013) is: 8.3%    Values used to calculate the score:      Age: 46 years      Sex: Male      Is Non- : No      Diabetic: Yes      Tobacco smoker: No      Systolic Blood Pressure: 112 mmHg      Is BP treated: Yes      HDL Cholesterol: 29 mg/dL      Total Cholesterol: 188 mg/dL  Recent Labs   Lab Test 11/10/21  0930 10/16/20  0824 12/14/15  1016 12/03/14  0958   CHOL 160 155   < > 189   HDL 27* 35*   < > 27*   LDL 51 45   < > Cannot estimate LDL when triglyceride exceeds 400 mg/dL  108   TRIG 631* 373*   < > 487*   CHOLHDLRATIO  --   --   --  7.0*    < > = values in this interval not displayed.     Ankylosing Spondylitis: current medications include Enbrel 50mcg once a week. Takes a diphenhydramine and a puff of albuterol prior to taking the injection as preventative measures. When he has been off of this in the past  for an infection he had more back pain.     Today's Vitals: There were no vitals taken for this visit.  ----------------    I spent 16 minutes with this patient today. All changes were made via collaborative practice agreement with Ksenia Lyles . A copy of the visit note was provided to the patient's provider(s).    The patient was sent via [employer] a summary of these recommendations.     Radha Mcdonald, Pharm.D, [facility]  Medication Therapy Management Pharmacist    Telemedicine Visit Details  Type of service:  Telephone visit  Start Time: 11:31 AM  End Time: 11:47 AM  Originating Location (patient location): [place]  Distant Location (provider location):  [facility]     Medication Therapy Recommendations  No medication therapy recommendations to display

## 2022-04-18 NOTE — TELEPHONE ENCOUNTER
Done   
Dr. Lyles,    Patient is in need of a new referral for diabetes education. Once referral is placed our schedulers will contact him to make an appt.    Thanks so much!    Dianne Marquis RN, [facility]      
[As Noted in HPI] : as noted in HPI

## 2022-04-19 ENCOUNTER — VIRTUAL VISIT (OUTPATIENT)
Dept: PHARMACY | Facility: CLINIC | Age: 47
End: 2022-04-19
Payer: COMMERCIAL

## 2022-04-19 PROCEDURE — 99606 MTMS BY PHARM EST 15 MIN: CPT | Performed by: PHARMACIST

## 2022-04-19 PROCEDURE — 99607 MTMS BY PHARM ADDL 15 MIN: CPT | Performed by: PHARMACIST

## 2022-04-19 NOTE — PATIENT INSTRUCTIONS
"Recommendations from today's MTM visit:                                                       No medication changes recommended today.    Follow-up: 3 months    It was great speaking with you today.  I value your experience and would be very thankful for your time in providing feedback in our clinic survey. In the next few days, you may receive an email or text message from [facility] [employer] with a link to a survey related to your  clinical pharmacist.\"     To schedule another MTM appointment, please call the clinic directly or you may call the MTM scheduling line at [phone] or toll-free at [phone].     My Clinical Pharmacist's contact information:                                                      Please feel free to contact me with any questions or concerns you have.      Radha Mcdonald, Pharm.D, [facility]  Medication Therapy Management Pharmacist      "

## 2022-04-20 ENCOUNTER — OFFICE VISIT (OUTPATIENT)
Dept: RHEUMATOLOGY | Facility: CLINIC | Age: 47
End: 2022-04-20
Payer: MEDICARE

## 2022-04-20 ENCOUNTER — OFFICE VISIT (OUTPATIENT)
Dept: DERMATOLOGY | Facility: CLINIC | Age: 47
End: 2022-04-20
Payer: MEDICARE

## 2022-04-20 ENCOUNTER — TELEPHONE (OUTPATIENT)
Dept: RHEUMATOLOGY | Facility: CLINIC | Age: 47
End: 2022-04-20

## 2022-04-20 VITALS
BODY MASS INDEX: 36.43 KG/M2 | DIASTOLIC BLOOD PRESSURE: 82 MMHG | HEART RATE: 74 BPM | WEIGHT: 315 LBS | SYSTOLIC BLOOD PRESSURE: 126 MMHG | OXYGEN SATURATION: 98 %

## 2022-04-20 DIAGNOSIS — Z51.89 VISIT FOR WOUND CHECK: Primary | ICD-10-CM

## 2022-04-20 DIAGNOSIS — E66.812 OBESITY, CLASS II, BMI 35-39.9: ICD-10-CM

## 2022-04-20 DIAGNOSIS — L73.2 HIDRADENITIS SUPPURATIVA: ICD-10-CM

## 2022-04-20 DIAGNOSIS — M45.9 ANKYLOSING SPONDYLITIS, UNSPECIFIED SITE OF SPINE (H): Primary | ICD-10-CM

## 2022-04-20 PROCEDURE — 99214 OFFICE O/P EST MOD 30 MIN: CPT | Performed by: INTERNAL MEDICINE

## 2022-04-20 PROCEDURE — 99024 POSTOP FOLLOW-UP VISIT: CPT | Performed by: DERMATOLOGY

## 2022-04-20 RX ORDER — MEDROXYPROGESTERONE ACETATE 150 MG/ML
50 INJECTION, SUSPENSION INTRAMUSCULAR WEEKLY
Qty: 4 ML | Refills: 7 | Status: SHIPPED | OUTPATIENT
Start: 2022-04-20 | End: 2022-10-19

## 2022-04-20 RX ORDER — BUPIVACAINE HYDROCHLORIDE 2.5 MG/ML
10 INJECTION, SOLUTION EPIDURAL; INFILTRATION; INTRACAUDAL ONCE
Status: COMPLETED | OUTPATIENT
Start: 2022-04-20 | End: 2022-04-20

## 2022-04-20 RX ORDER — TRIAMCINOLONE ACETONIDE 40 MG/ML
40 INJECTION, SUSPENSION INTRA-ARTICULAR; INTRAMUSCULAR ONCE
Status: COMPLETED | OUTPATIENT
Start: 2022-04-20 | End: 2022-04-20

## 2022-04-20 RX ADMIN — TRIAMCINOLONE ACETONIDE 40 MG: 40 INJECTION, SUSPENSION INTRA-ARTICULAR; INTRAMUSCULAR at 10:22

## 2022-04-20 RX ADMIN — BUPIVACAINE HYDROCHLORIDE 25 MG: 2.5 INJECTION, SOLUTION EPIDURAL; INFILTRATION; INTRACAUDAL at 10:22

## 2022-04-20 ASSESSMENT — PAIN SCALES - GENERAL: PAINLEVEL: NO PAIN (0)

## 2022-04-20 NOTE — PROGRESS NOTES
[facility] Dermatology Note  Encounter Date: Apr 26, 2022  Office Visit     Dermatology Problem List:  1. Immunosuppresion for ankylosing spondylitis, on Enbrel.  2. Acne vulgaris s/p Accutane in 1990's  3. Folliculitis  - BPO wash, Hibiclens, clobetasol for flares  4. HS - active lesion on the left buttock, excision 4/7/22  - doxy 100mg BID x 3 months (initiated 2/22/22); BPO wash/Hibiclens; topical clindamycin   5. Repigmented nevus - lower back - Will obtained record.     ____________________________________________     Assessment & Plan:     # Hidradenitis suppurativa - left buttock s/p excision 4/7/22. Granulation surgical site on the left buttock. Appears healthy.   - Continue Doxycycline 100 mg BID to completion.   - Continue BPO wash BID     # History of diarrhea intermittently associated with HS   - Referral to Gastroenterology  (already seeing [place] [facility])     # Multiple clinically benign nevi.   - No further intervention needed.    # Cyst  - left chest   - Will follow up with Dr. Tilley for removal.     # Repigmented nevus - lower back   - Will obtained record. Recheck with Dr. Tilley       #immunosuppression on Enbrel- for ankylosing spondylitis    Procedures Performed:   none    Follow-up: 1 year(s) in-person, or earlier for new or changing lesions    Staff and Scribe:     Scribe Disclosure:   Rodri DEVRIES, am serving as a scribe to document services personally performed by this physician, Dr. Janice Gonzales, based on data collection and the provider's statements to me.     Provider Disclosure:   The documentation recorded by the scribe accurately reflects the services I personally performed and the decisions made by me.    Janice Gonzales MD    Department of Dermatology  [facility]: Phone: [phone], Fax:[phone]  [facility]: Phone: [phone], Fax:  [phone]      ____________________________________________    CC: Skin Check (Areas of concern upper legs no personal or family history of skin cancer hx of immunosuppression ) and Derm Problem (HS currently on Doxy. Patient would like to know if he should  continue or stop after HS wound is healed from prior surgery)    HPI:  Mr. Joel Pineda is a(n) 46 year old male who presents today as a return patient for a skin check.     Last seen on 4/20/22 for a wound check on the left buttock (HS). At that time, patient to continue doxyclycline 100mg BID and Hibiclens BID.     Today, patient notes concern on the upper legs. Patient notes intermittent diarrhea.      Patient is currently taking doxycycline and is wondering if he should stop it s/p excision.    He notes a lesion on the left chest.     Patient is otherwise feeling well, without additional skin concerns.    Labs Reviewed:  N/A    Physical Exam:  Vitals: There were no vitals taken for this visit.  SKIN: Total skin excluding the undergarment areas was performed. The exam included the head/face, neck, buttocks,  both arms, chest, back, abdomen, both legs, digits and/or nails.   - Multiple regular brown pigmented macules and papules are identified on the trunk.   -  Repigmented nevus - lower back  - Granulating surgical site on the left buttock.   - No other lesions of concern on areas examined.     Medications:  Current Outpatient Medications   Medication     acetaminophen 500 MG CAPS     albuterol (PROAIR HFA/PROVENTIL HFA/VENTOLIN HFA) 108 (90 Base) MCG/ACT inhaler     aspirin (ASA) 81 MG tablet     cetirizine (ZYRTEC) 10 MG tablet     cholecalciferol (D3-50) 1250 mcg (40190 units) capsule     clindamycin (CLINDAMAX) 1 % external gel     clonazePAM (KLONOPIN) 0.5 MG tablet     cyanocobalamin (CYANOCOBALAMIN) 1000 MCG/ML injection     doxycycline monohydrate (MONODOX) 100 MG capsule     dronabinol (MARINOL) 5 MG capsule     DULoxetine (CYMBALTA) 60 MG  "capsule     EPINEPHrine (ANY BX GENERIC EQUIV) 0.3 MG/0.3ML injection 2-pack     ergocalciferol (ERGOCALCIFEROL) 1.25 MG (57278 UT) capsule     eszopiclone (LUNESTA) 3 MG tablet     etanercept (ENBREL SURECLICK) 50 MG/ML autoinjector     famotidine (PEPCID) 20 MG tablet     fenofibrate (TRICOR) 48 MG tablet     fluticasone (FLONASE) 50 MCG/ACT nasal spray     fluticasone-salmeterol (ADVAIR) 250-50 MCG/DOSE inhaler     levothyroxine (SYNTHROID/LEVOTHROID) 75 MCG tablet     lidocaine (XYLOCAINE) 5 % external ointment     melatonin 3 MG tablet     methocarbamol (ROBAXIN) 500 MG tablet     metoclopramide (REGLAN) 5 MG tablet     metoprolol succinate ER (TOPROL-XL) 200 MG 24 hr tablet     montelukast (SINGULAIR) 10 MG tablet     nabumetone (RELAFEN) 500 MG tablet     naloxone (NARCAN) 4 MG/0.1ML nasal spray     olopatadine (PATADAY) 0.2 % ophthalmic solution     omega-3 acid ethyl esters (LOVAZA) 1 g capsule     omeprazole 20 MG tablet     ondansetron (ZOFRAN-ODT) 8 MG ODT tab     order for DME     order for DME     oxyCODONE (ROXICODONE) 5 MG tablet     pregabalin (LYRICA) 150 MG capsule     pyridostigmine (MESTINON) 60 MG tablet     ramipril (ALTACE) 10 MG capsule     risperiDONE (RISPERDAL) 0.5 MG tablet     rizatriptan (MAXALT-MLT) 5 MG ODT     rosuvastatin (CRESTOR) 40 MG tablet     syringe, disposable, (BD TUBERCULIN SYRINGE) 1 ML MISC     syringe/needle, disp, (BD INTEGRA SYRINGE) 25G X 1\" 3 ML MISC     valACYclovir (VALTREX) 500 MG tablet     vitamin B complex with vitamin C (STRESS TAB) tablet     ZINC SULFATE-VITAMIN C MT     No current facility-administered medications for this visit.      Past Medical History:   Patient Active Problem List   Diagnosis     Intermittent asthma     Hyperlipidemia LDL goal <100     Chronic nonallergic rhinitis     Diverticulosis     GERD (gastroesophageal reflux disease)     Generalized anxiety disorder     LLOYD (obstructive sleep apnea)- mild (AHI 11)     Intracranial arachnoid " cyst     Facet arthritis of cervical region     Acquired hypothyroidism     Bipolar 2 disorder (H)     Chronic midline low back pain without sciatica     Irritable bowel syndrome with diarrhea     B12 deficiency     Essential hypertension with goal blood pressure less than 140/90     Chronic, continuous use of opioids     Ankylosing spondylitis of sacral region (H)     Morbid obesity due to hypertriglyceridemia (H)     Fatty infiltration of liver     DDD (degenerative disc disease), lumbar     Type 2 diabetes mellitus with complication, without long-term current use of insulin (H)     Peripheral polyneuropathy     History of pulmonary embolism     Ingrown toenail     Hypertriglyceridemia     Gastroparesis     Orthostatic dizziness     POTS (postural orthostatic tachycardia syndrome)     PHN (postherpetic neuralgia)     Dysautonomia (H)     Chronic pain syndrome     Borderline personality disorder (H)     MDD (major depressive disorder), recurrent severe, without psychosis (H)     Folliculitis     Immunosuppression (H)     Past Medical History:   Diagnosis Date     Acne      Acquired hypothyroidism      Allergic state      Ankylosing spondylitis lumbar region (H)      Ankylosing spondylitis of sacral region (H)      Anxiety      Bipolar 2 disorder (H)      Chest pain     Chest pain, regulated w/BP meds. Clear arteries.     Chronic pain      DDD (degenerative disc disease), lumbar      Depressive disorder      Diabetes (H)      Diverticulosis      Facet arthritis of cervical region      Gastroesophageal reflux disease      Hypertension      IBS (irritable bowel syndrome)      Intracranial arachnoid cyst      Major depressive disorder, recurrent episode (H)     Multiple psych providers - they manage meds August 2015: Provigil induced severe mood dis-function      Major depressive disorder, recurrent episode, severe (H) 12/2/2020     MDD (major depressive disorder), recurrent severe, without psychosis (H) 7/21/2021      LLOYD (obstructive sleep apnea)- mild (AHI 11)      Polyneuropathy      Pulmonary embolism (H)      Skin exam, screening for cancer 12/3/2013     Sleep apnea      Uncomplicated asthma         CC JOCELYN Nava CNP  [address]  [place] [address] on close of this encounter.

## 2022-04-20 NOTE — PATIENT INSTRUCTIONS
"Bleach Baths    How do I make a dilute bleach bath?  Use household bleach (such as Clorox ). Check the bottle to make sure that the concentration of bleach is about 6%. The bleach concentration may be labeled as sodium hypochlorite.   Avoid bleach that is labeled \"concentrated,\" \"splashless\" or has a fragrance.  Fill up the tub with lukewarm water (about 40 gallons in a normal bathtub).  Pour   to   cup of bleach into the bath water for a normal full bathtub.  For smaller tubs, use two teaspoons of bleach per gallon of water.  Completely mix the added bleach in the water.    How do I use a dilute bleach bath for treatment?  For adults and children, soak in the water for about 10 minutes.  Thoroughly rinse the skin with fresh, clean, lukewarm water after the bleach bath.   Pat the skin dry gently and then put on medicine and/or moisturizer.   Repeat bleach baths two to three times a week.     Is there anything else I need to know about taking these baths?  Do not use bleach directly on the skin.  Do not let bleach mixture come in contact with eyes or scalp.  Do not drink dilute bleach mixture.  Drain unused portion right after each use.  Keep bleach bottle out of reach of children.    "

## 2022-04-20 NOTE — PROGRESS NOTES
[facility] Dermatology Note  Encounter Date: Apr 20, 2022  Office Visit     Dermatology Problem List:  1. Immunosuppresion for ankylosing spondylitis, on Enbrel.  2. Acne vulgaris s/p Accutane in 1990's  3. Folliculitis  - BPO wash, Hibiclens, clobetasol for flares  4. HS - active lesion on the left buttock, excision 4/7/22  - doxy 100mg BID x 3 months (initiated 2/22/22); BPO wash/Hibiclens; topical clindamycin    ____________________________________________    Assessment & Plan:    # Hidradenitis suppurativa - left buttock s/p excision 4/7/22. Wound appeared to be healing well on exam today. No excess or yellow discharge today.   - Photograph was obtained for clinical monitoring and inclusion in medical record.  - Wound was cleaned and petroleum jelly and dressings were applied.   - Continue daily dressing changes  - Continue Doxycycline 100 mg BID, as prescribed by Dr. Gonzales  - Continue Hibiclens BID       Procedures Performed:   None.    Follow-up: 3-4 week(s) in-person, or earlier for new or changing lesions    Staff and Scribe:     Scribe Disclosure:   I, Gera Bustos, am serving as a scribe to document services personally performed by this physician, Dr. Drake Tilley, based on data collection and the provider's statements to me.     Provider Disclosure:   The documentation recorded by the scribe accurately reflects the services I personally performed and the decisions made by me.    Drake Tilley DO    Department of Dermatology  [facility]: Phone: [phone], Fax:[phone]  [facility]: Phone: [phone], Fax: [phone]    ____________________________________________    CC: Wound Check (S/p HS excision.  Patient reports improvement in drainage and pain.  Rating pain as 0/10.)    HPI:  Mr. Joel Pineda is a(n) 46 year old male who presents today as a  return patient for a wound check.    Last seen 4/11/22 for a wound check. At that time, a photograph of the wound was obtained. Wound cleaned and dressings applied. Patient instructed to continue doxycycline 100 mg BID and Hibiclens BID.    Today, he reports improvement in the drainage and pain of the wound. Pain is a 0/10. Overall, he feels the site is doing much better. He has not noticed any cysts or abscesses forming in the area. He's been applying Vaseline to the wound daily. He's been doing bandage changes twice daily.    He also has a site on his back that has been treated at [facility] in the past on the back.    Had discharge in an area a week ago. Sites have quieted down over a few weeks. Site is not painful or bothersome.    Patient is otherwise feeling well, without additional skin concerns.    Labs Reviewed:  N/A    Physical Exam:  Vitals: There were no vitals taken for this visit.  SKIN: Focused examination of the left buttock was performed.  - Left Buttock: ovoid surgical ulcer with moderate erythema and edema in adjacent skin. No fibrinous discharge, nontender to palpation. Granulation tissue visible in the base of the ulcer  - No other lesions of concern on areas examined.     Medications:  Current Outpatient Medications   Medication     acetaminophen 500 MG CAPS     albuterol (PROAIR HFA/PROVENTIL HFA/VENTOLIN HFA) 108 (90 Base) MCG/ACT inhaler     aspirin (ASA) 81 MG tablet     cetirizine (ZYRTEC) 10 MG tablet     cholecalciferol (D3-50) 1250 mcg (39741 units) capsule     clindamycin (CLINDAMAX) 1 % external gel     clonazePAM (KLONOPIN) 0.5 MG tablet     cyanocobalamin (CYANOCOBALAMIN) 1000 MCG/ML injection     doxycycline monohydrate (MONODOX) 100 MG capsule     dronabinol (MARINOL) 5 MG capsule     DULoxetine (CYMBALTA) 60 MG capsule     EPINEPHrine (ANY BX GENERIC EQUIV) 0.3 MG/0.3ML injection 2-pack     ergocalciferol (ERGOCALCIFEROL) 1.25 MG (38372 UT) capsule     eszopiclone  "(LUNESTA) 3 MG tablet     etanercept (ENBREL SURECLICK) 50 MG/ML autoinjector     famotidine (PEPCID) 20 MG tablet     fenofibrate (TRICOR) 48 MG tablet     fluticasone (FLONASE) 50 MCG/ACT nasal spray     fluticasone-salmeterol (ADVAIR) 250-50 MCG/DOSE inhaler     levothyroxine (SYNTHROID/LEVOTHROID) 75 MCG tablet     lidocaine (XYLOCAINE) 5 % external ointment     melatonin 3 MG tablet     methocarbamol (ROBAXIN) 500 MG tablet     metoclopramide (REGLAN) 5 MG tablet     metoprolol succinate ER (TOPROL-XL) 200 MG 24 hr tablet     montelukast (SINGULAIR) 10 MG tablet     nabumetone (RELAFEN) 500 MG tablet     naloxone (NARCAN) 4 MG/0.1ML nasal spray     olopatadine (PATADAY) 0.2 % ophthalmic solution     omega-3 acid ethyl esters (LOVAZA) 1 g capsule     omeprazole 20 MG tablet     ondansetron (ZOFRAN-ODT) 8 MG ODT tab     order for DME     order for DME     oxyCODONE (ROXICODONE) 5 MG tablet     pregabalin (LYRICA) 150 MG capsule     pyridostigmine (MESTINON) 60 MG tablet     ramipril (ALTACE) 10 MG capsule     risperiDONE (RISPERDAL) 0.5 MG tablet     rizatriptan (MAXALT-MLT) 5 MG ODT     rosuvastatin (CRESTOR) 40 MG tablet     syringe, disposable, (BD TUBERCULIN SYRINGE) 1 ML MISC     syringe/needle, disp, (BD INTEGRA SYRINGE) 25G X 1\" 3 ML MISC     valACYclovir (VALTREX) 500 MG tablet     vitamin B complex with vitamin C (STRESS TAB) tablet     ZINC SULFATE-VITAMIN C MT     No current facility-administered medications for this visit.      Past Medical History:   Patient Active Problem List   Diagnosis     Intermittent asthma     Hyperlipidemia LDL goal <100     Chronic nonallergic rhinitis     Diverticulosis     GERD (gastroesophageal reflux disease)     Generalized anxiety disorder     LLOYD (obstructive sleep apnea)- mild (AHI 11)     Intracranial arachnoid cyst     Facet arthritis of cervical region     Acquired hypothyroidism     Bipolar 2 disorder (H)     Chronic midline low back pain without sciatica     " Irritable bowel syndrome with diarrhea     B12 deficiency     Essential hypertension with goal blood pressure less than 140/90     Chronic, continuous use of opioids     Ankylosing spondylitis of sacral region (H)     Morbid obesity due to hypertriglyceridemia (H)     Fatty infiltration of liver     DDD (degenerative disc disease), lumbar     Type 2 diabetes mellitus with complication, without long-term current use of insulin (H)     Peripheral polyneuropathy     History of pulmonary embolism     Ingrown toenail     Hypertriglyceridemia     Gastroparesis     Orthostatic dizziness     POTS (postural orthostatic tachycardia syndrome)     PHN (postherpetic neuralgia)     Dysautonomia (H)     Chronic pain syndrome     Borderline personality disorder (H)     MDD (major depressive disorder), recurrent severe, without psychosis (H)     Folliculitis     Immunosuppression (H)     Past Medical History:   Diagnosis Date     Acne      Acquired hypothyroidism      Allergic state      Ankylosing spondylitis lumbar region (H)      Ankylosing spondylitis of sacral region (H)      Anxiety      Bipolar 2 disorder (H)      Chest pain     Chest pain, regulated w/BP meds. Clear arteries.     Chronic pain      DDD (degenerative disc disease), lumbar      Depressive disorder      Diabetes (H)      Diverticulosis      Facet arthritis of cervical region      Gastroesophageal reflux disease      Hypertension      IBS (irritable bowel syndrome)      Intracranial arachnoid cyst      Major depressive disorder, recurrent episode (H)     Multiple psych providers - they manage meds August 2015: Provigil induced severe mood dis-function      Major depressive disorder, recurrent episode, severe (H) 12/2/2020     MDD (major depressive disorder), recurrent severe, without psychosis (H) 7/21/2021     LLOYD (obstructive sleep apnea)- mild (AHI 11)      Polyneuropathy      Pulmonary embolism (H)      Skin exam, screening for cancer 12/3/2013     Sleep  apnea      Uncomplicated asthma         CC No referring provider defined for this encounter. on close of this encounter.

## 2022-04-20 NOTE — LETTER
4/20/2022         RE: Joel Pineda  [address] [facility] [place] [address]        Dear Colleague,    Thank you for referring your patient, Joel Pineda, to the [facility]. Please see a copy of my visit note below.    [facility] Dermatology Note  Encounter Date: Apr 20, 2022  Office Visit     Dermatology Problem List:  1. Immunosuppresion for ankylosing spondylitis, on Enbrel.  2. Acne vulgaris s/p Accutane in 1990's  3. Folliculitis  - BPO wash, Hibiclens, clobetasol for flares  4. HS - active lesion on the left buttock, excision 4/7/22  - doxy 100mg BID x 3 months (initiated 2/22/22); BPO wash/Hibiclens; topical clindamycin    ____________________________________________    Assessment & Plan:    # Hidradenitis suppurativa - left buttock s/p excision 4/7/22. Wound appeared to be healing well on exam today. No excess or yellow discharge today.   - Photograph was obtained for clinical monitoring and inclusion in medical record.  - Wound was cleaned and petroleum jelly and dressings were applied.   - Continue daily dressing changes  - Continue Doxycycline 100 mg BID, as prescribed by Dr. Gonzales  - Continue Hibiclens BID       Procedures Performed:   None.    Follow-up: 3-4 week(s) in-person, or earlier for new or changing lesions    Staff and Scribe:     Scribe Disclosure:   I, Gera Bustos, am serving as a scribe to document services personally performed by this physician, Dr. Drake Tilley, based on data collection and the provider's statements to me.     Provider Disclosure:   The documentation recorded by the scribe accurately reflects the services I personally performed and the decisions made by me.    Drake Tilley DO    Department of Dermatology  [facility]: Phone: [phone], Fax:[phone]  [facility]: Phone: [phone],  Fax: [phone]    ____________________________________________    CC: Wound Check (S/p HS excision.  Patient reports improvement in drainage and pain.  Rating pain as 0/10.)    HPI:  Mr. Joel Pineda is a(n) 46 year old male who presents today as a return patient for a wound check.    Last seen 4/11/22 for a wound check. At that time, a photograph of the wound was obtained. Wound cleaned and dressings applied. Patient instructed to continue doxycycline 100 mg BID and Hibiclens BID.    Today, he reports improvement in the drainage and pain of the wound. Pain is a 0/10. Overall, he feels the site is doing much better. He has not noticed any cysts or abscesses forming in the area. He's been applying Vaseline to the wound daily. He's been doing bandage changes twice daily.    He also has a site on his back that has been treated at [facility] in the past on the back.    Had discharge in an area a week ago. Sites have quieted down over a few weeks. Site is not painful or bothersome.    Patient is otherwise feeling well, without additional skin concerns.    Labs Reviewed:  N/A    Physical Exam:  Vitals: There were no vitals taken for this visit.  SKIN: Focused examination of the left buttock was performed.  - Left Buttock: ovoid surgical ulcer with moderate erythema and edema in adjacent skin. No fibrinous discharge, nontender to palpation. Granulation tissue visible in the base of the ulcer  - No other lesions of concern on areas examined.     Medications:  Current Outpatient Medications   Medication     acetaminophen 500 MG CAPS     albuterol (PROAIR HFA/PROVENTIL HFA/VENTOLIN HFA) 108 (90 Base) MCG/ACT inhaler     aspirin (ASA) 81 MG tablet     cetirizine (ZYRTEC) 10 MG tablet     cholecalciferol (D3-50) 1250 mcg (44604 units) capsule     clindamycin (CLINDAMAX) 1 % external gel     clonazePAM (KLONOPIN) 0.5 MG tablet     cyanocobalamin (CYANOCOBALAMIN) 1000 MCG/ML injection     doxycycline monohydrate  "(MONODOX) 100 MG capsule     dronabinol (MARINOL) 5 MG capsule     DULoxetine (CYMBALTA) 60 MG capsule     EPINEPHrine (ANY BX GENERIC EQUIV) 0.3 MG/0.3ML injection 2-pack     ergocalciferol (ERGOCALCIFEROL) 1.25 MG (83680 UT) capsule     eszopiclone (LUNESTA) 3 MG tablet     etanercept (ENBREL SURECLICK) 50 MG/ML autoinjector     famotidine (PEPCID) 20 MG tablet     fenofibrate (TRICOR) 48 MG tablet     fluticasone (FLONASE) 50 MCG/ACT nasal spray     fluticasone-salmeterol (ADVAIR) 250-50 MCG/DOSE inhaler     levothyroxine (SYNTHROID/LEVOTHROID) 75 MCG tablet     lidocaine (XYLOCAINE) 5 % external ointment     melatonin 3 MG tablet     methocarbamol (ROBAXIN) 500 MG tablet     metoclopramide (REGLAN) 5 MG tablet     metoprolol succinate ER (TOPROL-XL) 200 MG 24 hr tablet     montelukast (SINGULAIR) 10 MG tablet     nabumetone (RELAFEN) 500 MG tablet     naloxone (NARCAN) 4 MG/0.1ML nasal spray     olopatadine (PATADAY) 0.2 % ophthalmic solution     omega-3 acid ethyl esters (LOVAZA) 1 g capsule     omeprazole 20 MG tablet     ondansetron (ZOFRAN-ODT) 8 MG ODT tab     order for DME     order for DME     oxyCODONE (ROXICODONE) 5 MG tablet     pregabalin (LYRICA) 150 MG capsule     pyridostigmine (MESTINON) 60 MG tablet     ramipril (ALTACE) 10 MG capsule     risperiDONE (RISPERDAL) 0.5 MG tablet     rizatriptan (MAXALT-MLT) 5 MG ODT     rosuvastatin (CRESTOR) 40 MG tablet     syringe, disposable, (BD TUBERCULIN SYRINGE) 1 ML MISC     syringe/needle, disp, (BD INTEGRA SYRINGE) 25G X 1\" 3 ML MISC     valACYclovir (VALTREX) 500 MG tablet     vitamin B complex with vitamin C (STRESS TAB) tablet     ZINC SULFATE-VITAMIN C MT     No current facility-administered medications for this visit.      Past Medical History:   Patient Active Problem List   Diagnosis     Intermittent asthma     Hyperlipidemia LDL goal <100     Chronic nonallergic rhinitis     Diverticulosis     GERD (gastroesophageal reflux disease)     Generalized " anxiety disorder     LLOYD (obstructive sleep apnea)- mild (AHI 11)     Intracranial arachnoid cyst     Facet arthritis of cervical region     Acquired hypothyroidism     Bipolar 2 disorder (H)     Chronic midline low back pain without sciatica     Irritable bowel syndrome with diarrhea     B12 deficiency     Essential hypertension with goal blood pressure less than 140/90     Chronic, continuous use of opioids     Ankylosing spondylitis of sacral region (H)     Morbid obesity due to hypertriglyceridemia (H)     Fatty infiltration of liver     DDD (degenerative disc disease), lumbar     Type 2 diabetes mellitus with complication, without long-term current use of insulin (H)     Peripheral polyneuropathy     History of pulmonary embolism     Ingrown toenail     Hypertriglyceridemia     Gastroparesis     Orthostatic dizziness     POTS (postural orthostatic tachycardia syndrome)     PHN (postherpetic neuralgia)     Dysautonomia (H)     Chronic pain syndrome     Borderline personality disorder (H)     MDD (major depressive disorder), recurrent severe, without psychosis (H)     Folliculitis     Immunosuppression (H)     Past Medical History:   Diagnosis Date     Acne      Acquired hypothyroidism      Allergic state      Ankylosing spondylitis lumbar region (H)      Ankylosing spondylitis of sacral region (H)      Anxiety      Bipolar 2 disorder (H)      Chest pain     Chest pain, regulated w/BP meds. Clear arteries.     Chronic pain      DDD (degenerative disc disease), lumbar      Depressive disorder      Diabetes (H)      Diverticulosis      Facet arthritis of cervical region      Gastroesophageal reflux disease      Hypertension      IBS (irritable bowel syndrome)      Intracranial arachnoid cyst      Major depressive disorder, recurrent episode (H)     Multiple psych providers - they manage meds August 2015: Provigil induced severe mood dis-function      Major depressive disorder, recurrent episode, severe (H)  12/2/2020     MDD (major depressive disorder), recurrent severe, without psychosis (H) 7/21/2021     LLOYD (obstructive sleep apnea)- mild (AHI 11)      Polyneuropathy      Pulmonary embolism (H)      Skin exam, screening for cancer 12/3/2013     Sleep apnea      Uncomplicated asthma         CC No referring provider defined for this encounter. on close of this encounter.      Again, thank you for allowing me to participate in the care of your patient.        Sincerely,        Drake Tilley MD

## 2022-04-20 NOTE — NURSING NOTE
Joel Pineda's goals for this visit include:   Chief Complaint   Patient presents with     Wound Check     S/p HS excision.  Patient reports improvement in drainage and pain.  Rating pain as 0/10.       He requests these members of his care team be copied on today's visit information: na/    PCP: Ksenia Lyles    Referring Provider:  No referring provider defined for this encounter.    There were no vitals taken for this visit.    Do you need any medication refills at today's visit? No  Ирина Badillo RN

## 2022-04-20 NOTE — PROGRESS NOTES
Diagnoses and all orders for this visit:       left Meralgia paresthetica         Diagnoses post: Left meralgia paresthetica  Current Procedure: Lateral femoral cutaneous nerve block  Current Indication (include preoperative):  Alleviation of pain      REASON FOR REFERRAL: Anterior lateral thigh pain and burning  Sonographic guidance will be used to ensure accurate placement.  PATIENT EDUCATION:  Ready to learn with no apparent learning barriers identified.  Learning preferences include listening. Explained diagnosis and treatment plan as well as treatment alternatives. Patient expressed understanding of the content.  Following denial of allergy and review of potential side effects and complications including but not necessarily limited to infection, bleeding, allergic reaction, post-injection flare, local tissue breakdown, injury to soft tissue and/or nerves and seizure, patient indicated their understanding and agreed to proceed. A written consent was obtained and is scanned into the chart. Written and signed consent obtained and is scanned into the chart.  PROCEDURE:  Prior to the procedure,a 12 MHz linear transducer was used to visualize the abdominal/groin musculature to determine the approach for the procedure.  Procedure was carried out using sterile technique including Chloraprep scrub, a sterile transducer cover, and sterile transducer gel. A simple surgical tray was used.  PROCEDURAL PAUSE:  Procedural pause conducted to verify correct patient identity, procedure to be performed, and as applicable, correct side/site, correct patient position, availability of implants, special equipment, or special requirements.  Patient position:  Supine  Transducer type:  12 MHz linear array transducer  Approach:  Medial to lateral parallel to long axis of transducer  Local Anesthesia:  25 gauge 2.5 inch needle was used to anesthetize the skin, subcutaneous tissue  with 5 ml of 1% Lidocaine  Injection: After confirming  needle tip position, syringe was replaced with one containing 1 ml of 40 mg/ml Kenalog and 1 ml of 0.25% Bupivacaine which was injected and seen hydodissecting the fascia sartorius and tensor fascia florida.  Needle was removed bandage placed over the wound.  AFTERCARE:  Patient tolerated the procedure without complication. After a short observation period, the patient was discharged under their own power and in excellent condition.  Pain noted to be a 6/10 before completion of the procedure and 0/10 after completion of the procedure.      Injection solution contained:  1ml of 0.25% bupivacaine, and 40mg of Kenalog.              Bubba Montgomery MD  [facility]

## 2022-04-20 NOTE — PATIENT INSTRUCTIONS
RHEUMATOLOGY    Dr. Oneil Baxter    [facility]  [address] [place] [address]  [place] [address]  Phone number: [phone]  Fax number: [phone]      Thank you for choosing [facility]!    Mackenzie Cavazos [facility] Rheumatology    ----    COVID19 vaccination:  A 5th mRNA vaccine (2nd booster) may be received at least 4 months after the 4th dose.

## 2022-04-20 NOTE — TELEPHONE ENCOUNTER
Free Drug Application Approved  Effective Dates: 3/2/22-12/31/22  Patient notified: Patient has successfully set up first order, 3/30/22  Additional Information: WUT    To send rx orders, pharmacy is listed in patient's chart.    RXCROSSROADS BY CEDRIC [facility] [address] [facility] Liaison  [email or web address]  Phone: [phone]  Fax: [phone]

## 2022-04-20 NOTE — PROGRESS NOTES
"  Rheumatology Clinic Visit      Joel Pineda MRN# [id number]   YOB: 1975 Age: 46 year old      Date of visit: 4/20/22   PCP: Dr. Ksenia Lyles    Chief Complaint   Patient presents with:  Ankylosing spondylitis    Assessment and Plan     1.  Ankylosing spondylitis: Many years of inflammatory back pain but no clear sacroiliitis seen on x-rays or MRIs; then had a lumbar spine x-ray on 2/23/2018 at [facility] showing \"Moderate L5-S1 and mild L4-5 degenerative disc disease and degenerative facet arthropathy. No evidence for fracture, subluxation, or spondylolisthesis. Chronic bridging enthesophyte across the lower right SI joint with irregularity of the joint space suspicious for sequelae of chronic inflammatory sacroiliitis. Correlate clinically.\"  This is complicated by a history of back surgery and degenerative changes.  HLA-B27 positive per record review but the actual lab report has not been seen.  He has a personal history of diverticulitis requiring sigmoidectomy.  Reportedly his mother has ulcerative colitis. Based on his symptoms, the x-ray results, and HLA-B27 positive, it is most likely ankylosing spondylitis and Remicade was started with improvement of lower back pain and resolution of lower back stiffness. However, Remicade was also associated with increased infections so it was changed to Simponi; Simponi was associated with hives, nausea, dizziness, and drowsiness, Humira (effective but associated with a sensation of burning on his tongue, and longstanding nausea that didn't seem related to me but did to Mr. Pineda). Has responded to TNF inhibitors so Enbrel was started and has responded well to Enbrel (with worsening arthritis symptoms each time Enbrel has been held).  Doing well at this time except for mild right Achilles tendon ache that has improved with physical therapy, and mild right low back pain that is improving.  He is already using NSAIDs.  Continue Enbrel.  Symptoms are mild, not " necessarily inflammatory; Continue enbrel.  Chronic illness  - Continue Enbrel 50mg SQ every 7 days     2.  Degenerative low back pain: Following in the pain management clinic and has been evaluated by neurosurgery.  Documented here for historical significance.    3. Obesity; reported hx of fatty liver disease, BMI 36.43: encouraged weight loss and discussed the health benefit.  Also discussed the importance of weight loss with regard to diabetes management.  Chronic illness    4. Neck pain: Has improved with PT exercises.  Not an issue today.    5.  Hidradenitis suppurativa: Reportedly had surgery, and has follow-up with dermatology later today.  He has a skin check with dermatology next week.    6.  Vaccinations: Vaccinations reviewed with Mr. Pineda.    - Influenza: up to date  - [id number]: up to date  - [id number]: up to date  - Shingrix: Up to date   - COVID-19: has received the Pfizer COVID-19 vaccine on 3/24/2021, 4/14/2021, 8/23/2021, 3/28/2022.  A 5th mRNA vaccine (2nd booster) may be received at least 4 months after the 4th dose.     Total minutes spent in evaluation with patient, documentation, , and review of pertinent studies and chart notes: 15     Mr. Pineda verbalized agreement with and understanding of the rational for the diagnosis and treatment plan.  All questions were answered to best of my ability and the patient's satisfaction. Mr. Pineda was advised to contact the clinic with any questions that may arise after the clinic visit.      Thank you for involving me in the care of the patient    Return to clinic: 6 months      HPI   Joel Pineda is a 46 year old male with a past medical history significant for hypertension, hyperlipidemia, asthma, history of pulmonary embolism, history of diverticulitis requiring sigmoidectomy, GERD, obstructive sleep apnea, bipolar disorder, hypothyroidism, B12 deficiency, CKD? (reportedly issue with contrast) and chronic pain syndrome who presents for  follow-up of ankylosing spondylitis.    Today, 4/20/2022: Had surgery for hidradenitis suppurativa and has follow-up with dermatology today.  Diabetic neuropathy and is following in the pain clinic.  Tolerating Enbrel well.  Mild left SI joint ache that is worse with activity and improves with rest.  Trying to lose weight.  Mild ache at the right Achilles enthesis without swelling or increased warmth that has been improving with physical therapy exercises    Mother: ulcerative colitis    Tobacco: None  EtOH: None  Drugs: None    ROS   12 point review of system was completed and negative except as noted in the HPI     Active Problem List     Patient Active Problem List   Diagnosis     Intermittent asthma     Hyperlipidemia LDL goal <100     Chronic nonallergic rhinitis     Diverticulosis     GERD (gastroesophageal reflux disease)     Generalized anxiety disorder     LLOYD (obstructive sleep apnea)- mild (AHI 11)     Intracranial arachnoid cyst     Facet arthritis of cervical region     Acquired hypothyroidism     Bipolar 2 disorder (H)     Chronic midline low back pain without sciatica     Irritable bowel syndrome with diarrhea     B12 deficiency     Essential hypertension with goal blood pressure less than 140/90     Chronic, continuous use of opioids     Ankylosing spondylitis of sacral region (H)     Morbid obesity due to hypertriglyceridemia (H)     Fatty infiltration of liver     DDD (degenerative disc disease), lumbar     Type 2 diabetes mellitus with complication, without long-term current use of insulin (H)     Peripheral polyneuropathy     History of pulmonary embolism     Ingrown toenail     Hypertriglyceridemia     Gastroparesis     Orthostatic dizziness     POTS (postural orthostatic tachycardia syndrome)     PHN (postherpetic neuralgia)     Dysautonomia (H)     Chronic pain syndrome     Borderline personality disorder (H)     MDD (major depressive disorder), recurrent severe, without psychosis (H)      Folliculitis     Immunosuppression (H)     Past Medical History     Past Medical History:   Diagnosis Date     Acne      Acquired hypothyroidism      Allergic state      Ankylosing spondylitis lumbar region (H)      Ankylosing spondylitis of sacral region (H)      Anxiety      Bipolar 2 disorder (H)      Chest pain     Chest pain, regulated w/BP meds. Clear arteries.     Chronic pain      DDD (degenerative disc disease), lumbar      Depressive disorder      Diabetes (H)      Diverticulosis      Facet arthritis of cervical region      Gastroesophageal reflux disease      Hypertension      IBS (irritable bowel syndrome)      Intracranial arachnoid cyst      Major depressive disorder, recurrent episode (H)     Multiple psych providers - they manage meds August 2015: Provigil induced severe mood dis-function      Major depressive disorder, recurrent episode, severe (H) 12/2/2020     MDD (major depressive disorder), recurrent severe, without psychosis (H) 7/21/2021     LLOYD (obstructive sleep apnea)- mild (AHI 11)      Polyneuropathy      Pulmonary embolism (H)      Skin exam, screening for cancer 12/3/2013     Sleep apnea      Uncomplicated asthma      Past Surgical History     Past Surgical History:   Procedure Laterality Date     BACK SURGERY  10/07    lumbar discectomy L5-S1     COLONOSCOPY      Note: colonoscopy scheduled with [facility] on Friday, 9/4/15     COSMETIC SURGERY  2012    Nose Exterior - functional     GI SURGERY  August 2013    Sigmoidectomy     HERNIA REPAIR, UMBILICAL  8/23/11    Dr. Evan whiting     INCISION AND DRAINAGE, ABSCESS, COMPLEX  8/23/11    umbilical, Dr. Evan Beavers     LAPAROSCOPIC ASSISTED COLECTOMY LEFT (DESCENDING)  8/15/2013    Procedure: LAPAROSCOPIC ASSISTED COLECTOMY LEFT (DESCENDING);  Laparoscopic Hand Assisted Sigmoid Resection, Mobilization of Splenic Fissure, coloproctoscopy, *Latex Free Room* Anesthesia General with Pain block  ;  Surgeon: Aurora Justice MD;  Location:  [facility] OR     NERVE SURGERY  8/18/11    RF ablation @ L3-S1 @ [facility]     RECONSTRUCT NOSE AND SEPTUM (FUNCTIONAL)  10/14/2011    Procedure:RECONSTRUCT NOSE AND SEPTUM (FUNCTIONAL); Functional Septorhinoplasty, Turbinate Reduction, ; Surgeon:CEDRIC CUEVAS; Location:[facility] OR     SINUS SURGERY  10/1/01    ethmoidectomy chronic sinusitis     Allergy     Allergies   Allergen Reactions     Amoxicillin-Pot Clavulanate Difficulty breathing     Banana Shortness Of Breath     Pt reports organic Banana is okay.      Nitroglycerin Palpitations     Penicillins Anaphylaxis     Provigil [Modafinil] Shortness Of Breath     headache     Gadolinium Hives and Itching     Patient was premedicated for the contrast allergy. He did still have a reaction a few hours after injection. Hives and itching. Dr. Gomez told tech to inform pt he should only have contrast again in the future when premedicated and at a hospital. Not at an outpatient facility.      Ketoconazole      Topical cream caused swelling and itching     Dye [Contrast Dye] Other (See Comments) and Hives     Moderate flushing, CT contrast     Gabapentin      Other reaction(s): hives     Golimumab      Hives, bradycardia, face swelling     Naproxen      Other reaction(s): Bleeding Gums     Neurontin [Gabapentin] Hives     Moderate hives     Nortriptyline Hives     Varicella Virus Vaccine Live      Rash     Flagyl [Metronidazole Hcl] Palpitations and Hives     Latex Rash     Metronidazole Palpitations, Other (See Comments) and Rash     dizziness (versus ciprofloxacin taken at same time)     Current Medication List     Current Outpatient Medications   Medication Sig     acetaminophen 500 MG CAPS Take 1,000 mg by mouth 2 times daily      albuterol (PROAIR HFA/PROVENTIL HFA/VENTOLIN HFA) 108 (90 Base) MCG/ACT inhaler Inhale 2 puffs into the lungs every 4 hours as needed for shortness of breath / dyspnea or wheezing     aspirin (ASA) 81 MG tablet Take 81 mg by mouth daily       cetirizine (ZYRTEC) 10 MG tablet Take 1 tablet (10 mg) by mouth At Bedtime     cholecalciferol (D3-50) 1250 mcg (95229 units) capsule TAKE 1 CAPSULE BY MOUTH EVERY 2 WEEKS     clindamycin (CLINDAMAX) 1 % external gel For flares, start twice daily     clonazePAM (KLONOPIN) 0.5 MG tablet Take 0.5 mg by mouth 2 times daily as needed      cyanocobalamin (CYANOCOBALAMIN) 1000 MCG/ML injection INJECT 1 ML INTO THE MUSCLE EVERY 30 DAYS     doxycycline monohydrate (MONODOX) 100 MG capsule Take 1 capsule (100 mg) by mouth 2 times daily     dronabinol (MARINOL) 5 MG capsule Take 1 capsule by mouth 2 times daily     DULoxetine (CYMBALTA) 60 MG capsule Take 120 mg by mouth daily      EPINEPHrine (ANY BX GENERIC EQUIV) 0.3 MG/0.3ML injection 2-pack Inject 0.3 mLs (0.3 mg) into the muscle once as needed for anaphylaxis     ergocalciferol (ERGOCALCIFEROL) 1.25 MG (81051 UT) capsule take 1 capsule by ORAL route every 2 weeks     eszopiclone (LUNESTA) 3 MG tablet Take 3 mg by mouth At Bedtime      etanercept (ENBREL SURECLICK) 50 MG/ML autoinjector Inject 50 mg Subcutaneous once a week . Hold for signs of infection, and seek medical attention.     famotidine (PEPCID) 20 MG tablet Prior to administration of Humira every 2 weeks (Patient taking differently: Take 20 mg by mouth every 7 days Prior to Enbrel Injections to prevent skin reaction)     fenofibrate (TRICOR) 48 MG tablet Take 1 tablet (48 mg) by mouth daily     fluticasone (FLONASE) 50 MCG/ACT nasal spray Spray 1 spray into both nostrils daily     fluticasone-salmeterol (ADVAIR) 250-50 MCG/DOSE inhaler Inhale 1 puff into the lungs every 12 hours     levothyroxine (SYNTHROID/LEVOTHROID) 75 MCG tablet TAKE ONE TABLET BY MOUTH EVERY MORNING     lidocaine (XYLOCAINE) 5 % external ointment APPLY TOPICALLY 4 TIMES DAILY AS NEEDED FOR PAIN      melatonin 3 MG tablet Take 13 mg by mouth nightly as needed      methocarbamol (ROBAXIN) 500 MG tablet TAKE 1-2 TABLETS BY MOUTH FOUR TIMES  DAILY AS NEEDED FOR MUSCLE SPASMS     metoclopramide (REGLAN) 5 MG tablet Take 5 mg by mouth 2 times daily     metoprolol succinate ER (TOPROL-XL) 200 MG 24 hr tablet TAKE ONE TABLET BY MOUTH TWICE DAILY     montelukast (SINGULAIR) 10 MG tablet Take 1 tablet (10 mg) by mouth every evening     nabumetone (RELAFEN) 500 MG tablet TAKE 1-2 TABLETS BY MOUTH TWICE DAILY WITH FOOD AS NEEDED FOR MODERATE PAIN     naloxone (NARCAN) 4 MG/0.1ML nasal spray Spray 1 spray (4 mg) into one nostril alternating nostrils once as needed for opioid reversal every 2-3 minutes until assistance arrives     olopatadine (PATADAY) 0.2 % ophthalmic solution Place 1 drop into both eyes daily     omega-3 acid ethyl esters (LOVAZA) 1 g capsule TAKE TWO CAPSULES BY MOUTH TWICE DAILY     omeprazole 20 MG tablet Take 20 mg by mouth daily      ondansetron (ZOFRAN-ODT) 8 MG ODT tab Take 8 mg by mouth every 8 hours as needed for nausea     order for DME Equipment being ordered: lumbosacral belt/brace     order for DME Respironics REMSTAR 60 Series Auto CPAP 9-13 cm H2O, Wisp nasal mask w/a large cushion and a chinstrap     oxyCODONE (ROXICODONE) 5 MG tablet Take 1 tablet (5 mg) by mouth every 6 hours as needed for pain (maximum 6 tablet(s) per day)     pregabalin (LYRICA) 150 MG capsule Take 1 capsule (150 mg) by mouth 3 times daily (Patient taking differently: Take 150 mg by mouth 2 times daily)     pyridostigmine (MESTINON) 60 MG tablet Take 1 tablet by mouth 3 times daily     ramipril (ALTACE) 10 MG capsule TAKE 1 CAPSULE BY MOUTH ONE TIME DAILY     risperiDONE (RISPERDAL) 0.5 MG tablet Take 0.5 mg by mouth 2 times daily as needed     rizatriptan (MAXALT-MLT) 5 MG ODT DISSOLVE 1 TABLET BY MOUTH AT ONSET OF HEADACHE     rosuvastatin (CRESTOR) 40 MG tablet Take 1 tablet (40 mg) by mouth daily     syringe, disposable, (BD TUBERCULIN SYRINGE) 1 ML MISC Equipment being ordered: 1 ml tuberculin syringes to be used for Vitamin B12 injections.      "syringe/needle, disp, (BD INTEGRA SYRINGE) 25G X 1\" 3 ML MISC USE FOR VITAMIN B12 INJECTIONS     valACYclovir (VALTREX) 500 MG tablet Take 1 tablet (500 mg) by mouth daily     vitamin B complex with vitamin C (STRESS TAB) tablet Take 1 tablet by mouth daily     ZINC SULFATE-VITAMIN C MT Take 1 tablet by mouth daily     No current facility-administered medications for this visit.         Social History   See HPI    Family History     Family History   Problem Relation Age of Onset     Musculoskeletal Disorder Mother         back     Anxiety Disorder Mother      Colon Polyps Mother      Ulcerative Colitis Mother         and ischemic small intestine, surgery     Hypertension Mother      Breast Cancer Mother      Osteoporosis Mother      Diabetes Mother         Type 2, Diagnosed in 2014     Depression Mother         Takes Cymbalta to help with chronic pain + depx     Thyroid Disease Mother         Hypothyroidism     Obesity Mother         Under much better control latter half of 2015     Musculoskeletal Disorder Father         back     Substance Abuse Father      Hypertension Father      Hyperlipidemia Father      Depression Father         Off meds for many years. Seems \"ok\"     Heart Disease Maternal Grandmother      Heart Disease Maternal Grandfather      Psychotic Disorder Paternal Grandfather      Suicide Paternal Grandfather      Depression Paternal Grandfather         Pediatrician. Committed suicide by pistol in 1990.     Musculoskeletal Disorder Brother         back     Depression Brother         Expressed as anger and moodiness     Substance Abuse Brother      Substance Abuse Sister      Depression Sister         Mental Health Therapist, yet no anti-depressants?     Anxiety Disorder Sister         Mental Health Therapist, yet no anti-anxiety meds?     Other Cancer Other         Bladder Cancer - Fatal     Substance Abuse Brother      Colon Cancer No family hx of      Crohn's Disease No family hx of      Anesthesia " "Reaction No family hx of      Cancer No family hx of         No family history of skin cancer     Physical Exam     Temp Readings from Last 3 Encounters:   12/30/21 98  F (36.7  C) (Oral)   11/30/21 98  F (36.7  C) (Tympanic)   08/17/21 98.3  F (36.8  C) (Oral)     BP Readings from Last 5 Encounters:   04/14/22 112/76   04/07/22 116/76   03/15/22 (!) 144/88   03/08/22 124/87   02/15/22 126/84     Pulse Readings from Last 1 Encounters:   04/14/22 72     Resp Readings from Last 1 Encounters:   03/15/22 18     Estimated body mass index is 36.6 kg/m  as calculated from the following:    Height as of 12/30/21: 1.981 m (6' 6\").    Weight as of 12/30/21: 143.7 kg (316 lb 11.2 oz).      GEN: NAD.  Obese  HEENT:  Anicteric, noninjected sclera. No obvious external lesions of the ear and nose. Hearing intact.  CV: S1, S2. RRR. No m/r/g  PULM: No increased work of breathing. CTA bilaterally   MSK: MCPs, PIPs, DIPs without swelling or tenderness to palpation.  Wrists without swelling or tenderness to palpation.  Elbows and shoulders without swelling or tenderness to palpation.   Knees, ankles, and MTPs without swelling or tenderness to palpation.  Achilles tendons nontender to palpation; Achilles entheses without swelling, increased warmth, or overlying erythema.  Tender to palpation over the right SI joint; nontender to palpation over the left SI joint.   SKIN: No nail pitting.  No peripheral rash seen  PSYCH: Alert. Appropriate.        Labs / Imaging (select studies)     RF/CCP  Recent Labs   Lab Test 08/07/15  0846 09/03/14  1232   RHF <20 <20     CBC  Recent Labs   Lab Test 11/10/21  0930 10/09/20  1550 12/27/19  1209 09/26/19  1010   WBC 6.7 9.6 8.0 7.7   RBC 4.73 4.92 5.20 5.15   HGB 14.6 15.2 14.9 15.2   HCT 43.9 45.9 45.0 46.2   MCV 93 93 87 90   RDW 12.9 13.2 14.4 13.8    264 263 262   MCH 30.9 30.9 28.7 29.5   MCHC 33.3 33.1 33.1 32.9   NEUTROPHIL 48 51.2 45.0 39.7   LYMPH 41 35.2 38.1 44.9   MONOCYTE 8 10.2 " 13.7 12.2   EOSINOPHIL 2 2.1 2.4 2.3   BASOPHIL 1 1.1 0.8 0.9   ANEU  --  4.9 3.6 3.1   ALYM  --  3.4 3.0 3.5   KATIE  --  1.0 1.1 0.9   AEOS  --  0.2 0.2 0.2   ABAS  --  0.1 0.1 0.1   ANEUTAUTO 3.2  --   --   --    ALYMPAUTO 2.7  --   --   --    AMONOAUTO 0.5  --   --   --    AEOSAUTO 0.1  --   --   --    ABSBASO 0.1  --   --   --      CMP  Recent Labs   Lab Test 02/10/22  0942 11/10/21  0930 10/16/20  0824 10/09/20  1550 07/03/20  1251 01/24/20  1637    138 135 139 139 136   POTASSIUM 4.3 4.2 4.2 4.1 3.9 4.4   CHLORIDE 106 111* 104 108 104 106   CO2 24 25 26 22 30 24   ANIONGAP 7 2* 5 8 5 6   * 136* 114* 128* 105* 99   BUN 12 12 10 11 17 14   CR 0.95 0.74 1.28* 0.77 0.84 0.81   GFRESTIMATED >90 >90 66 >90 >90 >90   GFRESTBLACK  --   --  77 >90 >90 >90   ALYCE 9.7 9.1 9.4 9.3 9.4 9.2   BILITOTAL  --  0.4  --  0.4  --  0.4   ALBUMIN  --  4.0  --  4.3  --  4.4   PROTTOTAL  --  7.6  --  7.9  --  7.7   ALKPHOS  --  91  --  113  --  104   AST  --  40  --  53*  --  60*   ALT  --  38  --  53  --  61     Calcium/VitaminD  Recent Labs   Lab Test 02/10/22  0942 11/10/21  0930 02/10/21  1809 10/16/20  0824 01/24/20  1637 09/13/19  0943   ALYCE 9.7 9.1  --  9.4   < >  --    VITDT 46  --  38  --   --  40    < > = values in this interval not displayed.     ESR/CRP  Recent Labs   Lab Test 11/10/21  0930 10/09/20  1550 07/03/20  1251 09/26/19  1010   SED 6  --  4 4   CRP <2.9 <2.9  --  <2.9     Hepatitis B  Recent Labs   Lab Test 02/28/18  1157 01/06/15  1028   HBCAB Nonreactive  --    HEPBANG Nonreactive Nonreactive     Hepatitis C  Recent Labs   Lab Test 02/28/18  1157 01/06/15  1028   HCVAB Nonreactive Nonreactive   Assay performance characteristics have not been established for newborns,   infants, and children       Lyme ab screening  Recent Labs   Lab Test 02/22/19  1457   LYMEGM 0.02     Tuberculosis Screening  Recent Labs   Lab Test 04/23/21  1442 02/28/18  1157 01/06/15  1028   TBRSLT  --  Negative Negative    TBAGN  --  0.00 0.00   TBRST Negative  --   --      Immunization History     Immunization History   Administered Date(s) Administered     COVID-19,PF,Pfizer (12+ Yrs) 03/24/2021, 04/14/2021, 08/23/2021     COVID-19,PF,Pfizer 12+ Yrs (2022 and After) 03/28/2022     FLU 6-35 months 01/03/2019     Flu, Unspecified 08/01/2012, 09/12/2019, 08/20/2020     HepB-Adult 02/10/2021, 03/10/2021, 07/14/2021     Influenza (H1N1) 02/04/2010     Influenza (IIV3) PF 11/11/1999, 10/28/2003, 10/15/2008, 08/21/2012, 08/02/2013, 09/09/2013, 09/27/2017, 01/03/2019, 09/12/2019, 01/14/2020     Influenza Vaccine IM > 6 months Valent IIV4 (Alfuria,Fluzone) 10/02/2015, 10/10/2016, 09/27/2017, 09/07/2018, 01/03/2019, 09/12/2019, 01/14/2020     Influenza Vaccine Im 4yrs+ 4 Valent CCIIV4 09/08/2021     Influenza,INJ,MDCK,PF,Quad >4yrs 08/20/2020     Pneumo Conj 13-V (2010&after) 10/02/2015     Pneumococcal 23 valent 05/15/2009, 10/10/2016     TDAP Vaccine (Adacel) 07/16/2010, 01/23/2020     Td (Adult), Adsorbed 10/13/1997, 10/04/2002     Tdap (Adacel,Boostrix) 01/23/2020     Zoster vaccine recombinant adjuvanted (SHINGRIX) 05/29/2019, 09/12/2019          Chart documentation done in part with Dragon Voice recognition Software. Although reviewed after completion, some word and grammatical error may remain.      Oneil Baxter MD

## 2022-04-21 ENCOUNTER — LAB (OUTPATIENT)
Dept: LAB | Facility: CLINIC | Age: 47
End: 2022-04-21
Payer: MEDICARE

## 2022-04-21 ENCOUNTER — TELEPHONE (OUTPATIENT)
Dept: DERMATOLOGY | Facility: CLINIC | Age: 47
End: 2022-04-21

## 2022-04-21 DIAGNOSIS — E78.1 HYPERTRIGLYCERIDEMIA: ICD-10-CM

## 2022-04-21 LAB
ALT SERPL W P-5'-P-CCNC: 40 U/L (ref 0–70)
CHOLEST SERPL-MCNC: 155 MG/DL
CK SERPL-CCNC: 134 U/L (ref 30–300)
FASTING STATUS PATIENT QL REPORTED: YES
HDLC SERPL-MCNC: 38 MG/DL
LDLC SERPL CALC-MCNC: 44 MG/DL
NONHDLC SERPL-MCNC: 117 MG/DL
TRIGL SERPL-MCNC: 367 MG/DL

## 2022-04-21 PROCEDURE — 80061 LIPID PANEL: CPT

## 2022-04-21 PROCEDURE — 84460 ALANINE AMINO (ALT) (SGPT): CPT

## 2022-04-21 PROCEDURE — 82550 ASSAY OF CK (CPK): CPT

## 2022-04-21 PROCEDURE — 36415 COLL VENOUS BLD VENIPUNCTURE: CPT

## 2022-04-21 NOTE — TELEPHONE ENCOUNTER
If only concentrated bleach (8.25%) is available, about 2 and a half tablespoons for an adult size tub is equivalent to regular household bleach (5.25%) in the instructions.

## 2022-04-21 NOTE — TELEPHONE ENCOUNTER
"Treva- please advise what the ratio or amount of chlorine to use would be for concentrated bleach if unable to find UNconcentrated bleach.    How do I make a dilute bleach bath?    Use household bleach (such as Clorox ). Check the bottle to make sure that the concentration of bleach is about 6%. The bleach concentration may be labeled as sodium hypochlorite.     Avoid bleach that is labeled \"concentrated,\" \"splashless\" or has a fragrance.    Fill up the tub with lukewarm water (about 40 gallons in a normal bathtub).    Pour   to   cup of bleach into the bath water for a normal full bathtub.    For smaller tubs, use two teaspoons of bleach per gallon of water.    Completely mix the added bleach in the water.      Sandy Menjivar, LUDA on 4/21/2022 at 2:44 PM    "

## 2022-04-21 NOTE — TELEPHONE ENCOUNTER
[facility] Call Center    Phone Message    May a detailed message be left on voicemail: yes     Reason for Call: Appointment Intake    Referring Provider Name: Dr Drake Tilley  Diagnosis and/or Symptoms: 97514 Excision /83875 Intermediate repair   84034 Pathology      Also, please advise on non concentrated chlorine because everything in the store is too strong. Please call Yo Francis to schedule [phone]    Action Taken: Message routed to:  Other: [facility]    Travel Screening: Not Applicable

## 2022-04-22 ENCOUNTER — OFFICE VISIT (OUTPATIENT)
Dept: OPHTHALMOLOGY | Facility: CLINIC | Age: 47
End: 2022-04-22
Attending: OPHTHALMOLOGY
Payer: MEDICARE

## 2022-04-22 DIAGNOSIS — H10.13 ALLERGIC CONJUNCTIVITIS OF BOTH EYES: Primary | ICD-10-CM

## 2022-04-22 DIAGNOSIS — E11.8 TYPE 2 DIABETES MELLITUS WITH COMPLICATION, WITHOUT LONG-TERM CURRENT USE OF INSULIN (H): ICD-10-CM

## 2022-04-22 DIAGNOSIS — H52.13 MYOPIA OF BOTH EYES: ICD-10-CM

## 2022-04-22 DIAGNOSIS — H04.129 DRY EYE: ICD-10-CM

## 2022-04-22 DIAGNOSIS — M45.8 ANKYLOSING SPONDYLITIS OF SACRAL REGION (H): ICD-10-CM

## 2022-04-22 PROCEDURE — G0463 HOSPITAL OUTPT CLINIC VISIT: HCPCS | Mod: 25

## 2022-04-22 PROCEDURE — 99214 OFFICE O/P EST MOD 30 MIN: CPT | Performed by: OPHTHALMOLOGY

## 2022-04-22 PROCEDURE — 92015 DETERMINE REFRACTIVE STATE: CPT | Mod: GY

## 2022-04-22 ASSESSMENT — REFRACTION_WEARINGRX
OD_ADD: +1.25
OD_SPHERE: --3.75
OD_AXIS: 030
OS_CYLINDER: +1.75
OS_SPHERE: -4.75
OS_ADD: +1.25
OS_AXIS: 100
SPECS_TYPE: PAL
OD_CYLINDER: +1.00

## 2022-04-22 ASSESSMENT — TONOMETRY
IOP_METHOD: TONOPEN
OS_IOP_MMHG: 16
OD_IOP_MMHG: 16

## 2022-04-22 ASSESSMENT — VISUAL ACUITY
OD_CC: 20/20
METHOD: SNELLEN - LINEAR
OD_CC+: -1
OS_CC: 20/25

## 2022-04-22 ASSESSMENT — REFRACTION_MANIFEST
OD_ADD: +1.25
OS_ADD: +1.25
OD_CYLINDER: +1.00
OS_AXIS: 110
OS_SPHERE: -4.75
OS_CYLINDER: +1.25
OD_SPHERE: -3.75
OD_AXIS: 030

## 2022-04-22 ASSESSMENT — SLIT LAMP EXAM - LIDS
COMMENTS: MILD MGD
COMMENTS: MILD MGD

## 2022-04-22 ASSESSMENT — CUP TO DISC RATIO
OS_RATIO: 0.2
OD_RATIO: 0.25

## 2022-04-22 ASSESSMENT — CONF VISUAL FIELD
OS_NORMAL: 1
OD_NORMAL: 1

## 2022-04-22 ASSESSMENT — EXTERNAL EXAM - RIGHT EYE: OD_EXAM: NORMAL

## 2022-04-22 ASSESSMENT — EXTERNAL EXAM - LEFT EYE: OS_EXAM: NORMAL

## 2022-04-22 NOTE — NURSING NOTE
Chief Complaints and History of Present Illnesses   Patient presents with     Diabetic Eye Exam     Diabetic exam  Progressive are difficult to find right spot at times  Blood sugar don't check daily  Art tears bid BE  Pataday every day BE  A1C 6.3 taken 2 months ago  Lashaun NICOLAS 12:33 PM April 22, 2022        Chief Complaint(s) and History of Present Illness(es)     Diabetic Eye Exam     Vision: is stable    Associated symptoms: redness and itching.  Negative for flashes    Diabetes Type: Type 2 and controlled with diet    Blood Sugars: is controlled    Treatments tried: eye drops and artificial tears    Pain scale: 0/10    Comments: Diabetic exam  Progressive are difficult to find right spot at times  Blood sugar don't check daily  Art tears bid BE  Pataday every day BE  A1C 6.3 taken 2 months ago  Lashaun NICOLAS 12:33 PM April 22, 2022

## 2022-04-22 NOTE — TELEPHONE ENCOUNTER
LVM on patient identified voicemail informing patient that [employer] message with information was sent to patient. Call back number provided.     Antonette Ndiaye LPN

## 2022-04-22 NOTE — PROGRESS NOTES
HPI:  Joel Pineda is a 46 year old male presenting for diabetic eye exam.    Last visit 4/19/21 in [place] without retinopathy. Started ATs and pataday for allergies and dry eye.    Vision is good in the morning, gets a little blurrier over the course of the day. ATs provide temporary relief, help with comfort. Using PAtaday daily which stings/itches a little but then feels fine rest of day. Came off metformin for GI distress, sugars have been OK but following for neuropathy, has seen neurologist and had EMG/NCV. Still on Enbrel weekly.    Using: ATs BID, pataday daily    Past Ocular History:  Myopia  Tortuous retinal blood vessels    PMH:  DM2 - diagnosed end of 2019, not on insulin  Ankylosing spondylitis - on Enbrel weekly; pain medications for spondylitis  Hypothyroidism   Elevated cholesterol  PE's in early 40s - not on any blood thinners now  HTN  POTS  Hidradenitis suppurativa - had surgery April 2022    SH:  Never smoker. Currently on medical disability, worked as  at [employer]    FH:  No known family history of blindness, glaucoma, macular degeneration  Mother - kyphotic cervical spine and many spine issues but not formally diagnosed with ankylosing spondylitis, ulcerative colitis  Father's uncle - ankylosing spondylitis  Brother - getting workup for recent pulmonary embolism    ASSESSMENT and PLAN:  1. Allergic conjunctivitis of both eyes  - continue Pataday -- discussed using prn  - ATs as below    2. Dry eye  - continue ATs   - discussed adding warm compresses    3. Myopia of both eyes  - updated Rx    4. Type 2 diabetes mellitus with complication, without long-term current use of insulin (H)  - diagnosed end of 2019  - not on insulin  - last A1c 6.3 in Feb 2022, stable from 6.0 in August 2021 and 6.5 in Feb 2021  - no retinopathy on exam; tortuous blood vessels  - encouraged BS/BP control  - annual DFE    5. Ankylosing spondylitis of sacral region (H)  6. Hidradenitis  suppurativa  - no active uveitis and no stigmata of prior intraocular inflammation  - discussed signs/symptoms of uveitis including eye pain, redness, blurred vision, new floaters, photophobia and he understands to call/come in should these develop  - previously intolerant of Humira (hives)  - currently on Enbrel      Follow up in 1 year or sooner PRN        -----------------------------------------------------------------------------------    Attestation:  Complete documentation of historical and exam elements from today's encounter can be found in the full encounter summary report (not reduplicated in this progress note). I personally obtained the chief complaint(s) and history of present illness.  I confirmed and edited as necessary the review of systems, past medical/surgical history, family history, social history, and examination findings as documented by others; and I examined the patient myself. I personally reviewed the relevant tests, images, and reports as documented above.     I formulated and edited as necessary the assessment and plan and discussed the findings and management plan with the patient and family.      Audrey Ferreira MD

## 2022-04-24 ENCOUNTER — NURSE TRIAGE (OUTPATIENT)
Dept: NURSING | Facility: CLINIC | Age: 47
End: 2022-04-24
Payer: MEDICARE

## 2022-04-24 ENCOUNTER — HEALTH MAINTENANCE LETTER (OUTPATIENT)
Age: 47
End: 2022-04-24

## 2022-04-25 NOTE — TELEPHONE ENCOUNTER
Patient calling to report a cat bite from his pet cat that dm blood with long cut on wrist a couple inches long.  Patient has history of autoimmune disorder, and is on doxycycline daily due to HS.    Disposition is to go to ER.  Caller verbalized understanding of care advice and agrees with plan.    Geovanna Castellanos RN  [facility]    Reason for Disposition    [1] Cut (length > 1/8 inch or 3 mm) or skin tear AND [2] any animal    Additional Information    Negative: [1] Major bleeding (e.g., actively dripping or spurting) AND [2] can't be stopped    Negative: Sounds like a life-threatening emergency to the triager    Negative: Snake bite    Negative: Bite, wound, or sting from fish    Negative: [1] Any break in skin from BITE (e.g., cut, puncture or scratch) AND[2] WILD animal at risk for RABIES (e.g., bat, raccoon, simmons, skunk, coyote, other carnivores)    Negative: [1] Any break in skin from BITE (e.g., cut, puncture or scratch) AND[2] PET animial (e.g., dog, cat, or ferret) at risk for RABIES (e.g., sick, stray, unprovoked bite, developing country)    Negative: [1] Any break in skin from BITE (e.g., cut, puncture or scratch) AND[2] monkey    Negative: [1] EXPOSURE of non-intact skin (e.g., exposed person has dermatitis, abrasion, wound) AND[2] with animal BODY FLUID (e.g., saliva such as licking, blood, brain) AND[3] animal at high-risk for RABIES (e.g., bat, raccoon, simmons, skunk, coyote, other carnivores)    Protocols used: ANIMAL BITE-A-AH    COVID 19 Nurse Triage Plan/Patient Instructions    Please be aware that novel coronavirus (COVID-19) may be circulating in the community. If you develop symptoms such as fever, cough, or SOB or if you have concerns about the presence of another infection including coronavirus (COVID-19), please contact your health care provider or visit [email or web address].     Disposition/Instructions    ED Visit recommended. Follow protocol based instructions.      Bring Your Own Device:  Please also bring your smart device(s) (smart phones, tablets, laptops) and their charging cables for your personal use and to communicate with your care team during your visit.    Thank you for taking steps to prevent the spread of this virus.  o Limit your contact with others.  o Wear a simple mask to cover your cough.  o Wash your hands well and often.    Resources    [facility]: About COVID-19: [email or web address]    CDC: What to Do If You're Sick: www.cdc.gov/coronavirus/2019-ncov/about/steps-when-sick.html    CDC: Ending Home Isolation: www.cdc.gov/coronavirus/2019-ncov/hcp/disposition-in-home-patients.html     CDC: Caring for Someone: www.cdc.gov/coronavirus/2019-ncov/if-you-are-sick/care-for-someone.html     [facility]: Interim Guidance for Hospital Discharge to Home: [email or web address]/[email or web address]/hcp/hospdischarge.pdf    [facility] clinical trials (COVID-19 research studies): [email or web address]     Below are the COVID-19 hotlines at the [place] [facility] ([facility]). Interpreters are available.   o For health questions: Call 196-778-7462 or 1-515.493.4849 (7 a.m. to 7 p.m.)  o For questions about schools and childcare: Call 847-529-2206 or 1-807.608.6691 (7 a.m. to 7 p.m.)

## 2022-04-26 ENCOUNTER — OFFICE VISIT (OUTPATIENT)
Dept: DERMATOLOGY | Facility: CLINIC | Age: 47
End: 2022-04-26
Payer: MEDICARE

## 2022-04-26 DIAGNOSIS — D84.9 IMMUNOSUPPRESSION (H): ICD-10-CM

## 2022-04-26 DIAGNOSIS — D22.9 MULTIPLE BENIGN NEVI: ICD-10-CM

## 2022-04-26 DIAGNOSIS — L73.2 HIDRADENITIS SUPPURATIVA: Primary | ICD-10-CM

## 2022-04-26 PROCEDURE — 99213 OFFICE O/P EST LOW 20 MIN: CPT | Mod: 24 | Performed by: DERMATOLOGY

## 2022-04-26 ASSESSMENT — PAIN SCALES - GENERAL: PAINLEVEL: MILD PAIN (2)

## 2022-04-26 NOTE — LETTER
4/26/2022         RE: Joel Pineda  [address] [place] [address]        Dear Colleague,    Thank you for referring your patient, Joel Pineda, to the [facility]. Please see a copy of my visit note below.    [facility] D[facility]at[facility] Note  Encounter Date: Apr 26, 2022  Office Visit     Dermatology Problem List:  1. Immunosuppresion for ankylosing spondylitis, on Enbrel.  2. Acne vulgaris s/p Accutane in 1990's  3. Folliculitis  - BPO wash, Hibiclens, clobetasol for flares  4. HS - active lesion on the left buttock, excision 4/7/22  - doxy 100mg BID x 3 months (initiated 2/22/22); BPO wash/Hibiclens; topical clindamycin   5. Repigmented nevus - lower back - Will obtained record.     ____________________________________________     Assessment & Plan:     # Hidradenitis suppurativa - left buttock s/p excision 4/7/22. Granulation surgical site on the left buttock. Appears healthy.   - Continue Doxycycline 100 mg BID to completion.   - Continue BPO wash BID     # History of diarrhea intermittently associated with HS   - Referral to Gastroenterology  (already seeing [place] [facility])     # Multiple clinically benign nevi.   - No further intervention needed.    # Cyst  - left chest   - Will follow up with Dr. Tilley for removal.     # Repigmented nevus - lower back   - Will obtained record. Recheck with Dr. Tilley       #immunosuppression on Enbrel- for ankylosing spondylitis    Procedures Performed:   none    Follow-up: 1 year(s) in-person, or earlier for new or changing lesions    Staff and Scribe:     Scribe Disclosure:   I, Rodri Magallon, am serving as a scribe to document services personally performed by this physician, Dr. Janice Gonzales, based on data collection and the provider's statements to me.     Provider Disclosure:   The documentation recorded by the scribe accurately reflects the services I personally performed and the decisions made by me.    Janice Gonzales,  MD    Department of Dermatology  [facility]: Phone: [phone], Fax:[phone]  [facility]: Phone: [phone], Fax: [phone]      ____________________________________________    CC: Skin Check (Areas of concern upper legs no personal or family history of skin cancer hx of immunosuppression ) and Derm Problem (HS currently on Doxy. Patient would like to know if he should  continue or stop after HS wound is healed from prior surgery)    HPI:  Mr. Joel Pineda is a(n) 46 year old male who presents today as a return patient for a skin check.     Last seen on 4/20/22 for a wound check on the left buttock (HS). At that time, patient to continue doxyclycline 100mg BID and Hibiclens BID.     Today, patient notes concern on the upper legs. Patient notes intermittent diarrhea.      Patient is currently taking doxycycline and is wondering if he should stop it s/p excision.    He notes a lesion on the left chest.     Patient is otherwise feeling well, without additional skin concerns.    Labs Reviewed:  N/A    Physical Exam:  Vitals: There were no vitals taken for this visit.  SKIN: Total skin excluding the undergarment areas was performed. The exam included the head/face, neck, buttocks,  both arms, chest, back, abdomen, both legs, digits and/or nails.   - Multiple regular brown pigmented macules and papules are identified on the trunk.   -  Repigmented nevus - lower back  - Granulating surgical site on the left buttock.   - No other lesions of concern on areas examined.     Medications:  Current Outpatient Medications   Medication     acetaminophen 500 MG CAPS     albuterol (PROAIR HFA/PROVENTIL HFA/VENTOLIN HFA) 108 (90 Base) MCG/ACT inhaler     aspirin (ASA) 81 MG tablet     cetirizine (ZYRTEC) 10 MG tablet     cholecalciferol (D3-50) 1250 mcg (83439 units) capsule     clindamycin  "(CLINDAMAX) 1 % external gel     clonazePAM (KLONOPIN) 0.5 MG tablet     cyanocobalamin (CYANOCOBALAMIN) 1000 MCG/ML injection     doxycycline monohydrate (MONODOX) 100 MG capsule     dronabinol (MARINOL) 5 MG capsule     DULoxetine (CYMBALTA) 60 MG capsule     EPINEPHrine (ANY BX GENERIC EQUIV) 0.3 MG/0.3ML injection 2-pack     ergocalciferol (ERGOCALCIFEROL) 1.25 MG (51450 UT) capsule     eszopiclone (LUNESTA) 3 MG tablet     etanercept (ENBREL SURECLICK) 50 MG/ML autoinjector     famotidine (PEPCID) 20 MG tablet     fenofibrate (TRICOR) 48 MG tablet     fluticasone (FLONASE) 50 MCG/ACT nasal spray     fluticasone-salmeterol (ADVAIR) 250-50 MCG/DOSE inhaler     levothyroxine (SYNTHROID/LEVOTHROID) 75 MCG tablet     lidocaine (XYLOCAINE) 5 % external ointment     melatonin 3 MG tablet     methocarbamol (ROBAXIN) 500 MG tablet     metoclopramide (REGLAN) 5 MG tablet     metoprolol succinate ER (TOPROL-XL) 200 MG 24 hr tablet     montelukast (SINGULAIR) 10 MG tablet     nabumetone (RELAFEN) 500 MG tablet     naloxone (NARCAN) 4 MG/0.1ML nasal spray     olopatadine (PATADAY) 0.2 % ophthalmic solution     omega-3 acid ethyl esters (LOVAZA) 1 g capsule     omeprazole 20 MG tablet     ondansetron (ZOFRAN-ODT) 8 MG ODT tab     order for DME     order for DME     oxyCODONE (ROXICODONE) 5 MG tablet     pregabalin (LYRICA) 150 MG capsule     pyridostigmine (MESTINON) 60 MG tablet     ramipril (ALTACE) 10 MG capsule     risperiDONE (RISPERDAL) 0.5 MG tablet     rizatriptan (MAXALT-MLT) 5 MG ODT     rosuvastatin (CRESTOR) 40 MG tablet     syringe, disposable, (BD TUBERCULIN SYRINGE) 1 ML MISC     syringe/needle, disp, (BD INTEGRA SYRINGE) 25G X 1\" 3 ML MISC     valACYclovir (VALTREX) 500 MG tablet     vitamin B complex with vitamin C (STRESS TAB) tablet     ZINC SULFATE-VITAMIN C MT     No current facility-administered medications for this visit.      Past Medical History:   Patient Active Problem List   Diagnosis     " Intermittent asthma     Hyperlipidemia LDL goal <100     Chronic nonallergic rhinitis     Diverticulosis     GERD (gastroesophageal reflux disease)     Generalized anxiety disorder     LLOYD (obstructive sleep apnea)- mild (AHI 11)     Intracranial arachnoid cyst     Facet arthritis of cervical region     Acquired hypothyroidism     Bipolar 2 disorder (H)     Chronic midline low back pain without sciatica     Irritable bowel syndrome with diarrhea     B12 deficiency     Essential hypertension with goal blood pressure less than 140/90     Chronic, continuous use of opioids     Ankylosing spondylitis of sacral region (H)     Morbid obesity due to hypertriglyceridemia (H)     Fatty infiltration of liver     DDD (degenerative disc disease), lumbar     Type 2 diabetes mellitus with complication, without long-term current use of insulin (H)     Peripheral polyneuropathy     History of pulmonary embolism     Ingrown toenail     Hypertriglyceridemia     Gastroparesis     Orthostatic dizziness     POTS (postural orthostatic tachycardia syndrome)     PHN (postherpetic neuralgia)     Dysautonomia (H)     Chronic pain syndrome     Borderline personality disorder (H)     MDD (major depressive disorder), recurrent severe, without psychosis (H)     Folliculitis     Immunosuppression (H)     Past Medical History:   Diagnosis Date     Acne      Acquired hypothyroidism      Allergic state      Ankylosing spondylitis lumbar region (H)      Ankylosing spondylitis of sacral region (H)      Anxiety      Bipolar 2 disorder (H)      Chest pain     Chest pain, regulated w/BP meds. Clear arteries.     Chronic pain      DDD (degenerative disc disease), lumbar      Depressive disorder      Diabetes (H)      Diverticulosis      Facet arthritis of cervical region      Gastroesophageal reflux disease      Hypertension      IBS (irritable bowel syndrome)      Intracranial arachnoid cyst      Major depressive disorder, recurrent episode (H)      Multiple psych providers - they manage meds August 2015: Provigil induced severe mood dis-function      Major depressive disorder, recurrent episode, severe (H) 12/2/2020     MDD (major depressive disorder), recurrent severe, without psychosis (H) 7/21/2021     LLOYD (obstructive sleep apnea)- mild (AHI 11)      Polyneuropathy      Pulmonary embolism (H)      Skin exam, screening for cancer 12/3/2013     Sleep apnea      Uncomplicated asthma         CC Anitha Farrell, APRN CNP  [address]  [place] [address] on close of this encounter.      Again, thank you for allowing me to participate in the care of your patient.        Sincerely,        Janice Gonzales MD

## 2022-04-29 ENCOUNTER — MYC REFILL (OUTPATIENT)
Dept: FAMILY MEDICINE | Facility: CLINIC | Age: 47
End: 2022-04-29
Payer: MEDICARE

## 2022-04-29 DIAGNOSIS — M45.8 ANKYLOSING SPONDYLITIS OF SACRAL REGION (H): ICD-10-CM

## 2022-04-29 DIAGNOSIS — M51.369 DDD (DEGENERATIVE DISC DISEASE), LUMBAR: ICD-10-CM

## 2022-04-29 RX ORDER — OXYCODONE HYDROCHLORIDE 5 MG/1
5 TABLET ORAL EVERY 6 HOURS PRN
Qty: 35 TABLET | Refills: 0 | Status: SHIPPED | OUTPATIENT
Start: 2022-04-29 | End: 2022-05-21

## 2022-05-04 ENCOUNTER — MYC MEDICAL ADVICE (OUTPATIENT)
Dept: DERMATOLOGY | Facility: CLINIC | Age: 47
End: 2022-05-04
Payer: MEDICARE

## 2022-05-05 NOTE — PROGRESS NOTES
History of Present Illness - Joel Pineda is a very pleasant 46 year old male last seen on 5/22/2020, for RIGHT sided ear and orbital pain following dental surgery.  I felt that it was acute temporomandibular disease.  Although, he did have a history of functional endoscopic sinus surgery.  He had an ethmoidectomy in 2002, and rhinoplasty in 2010.  But no previous ear surgery.  He has gastroparesis, POTS, and autonomic insufficiency.    He had come back in May 2019 for ear symptoms.  He tells me that since October of 2018 he has had recurrent ear infections.  He has been on Humira for his ankylosing spondylitis.  He tells me that he has had some ear canal infections, but also thinks he has had fluid in the middle ear as well.  He has been treated for these by urgent care, PCP, and K[facility] Lyles as well.    Due to likely chronic otitis externa due to immunosuppression, I placed him on long term suppression with floxin and clotrimazole twice weekly long term.    He as come in with questions about worsening eustachian tube dysfunction and fullness in the ears.  But also some more nasal congestion  He is using Zyrtec and neilmed sinus rinse more recently.  He has known TMJ and uses a .    Howevver, he is here for a new issue, a lump on the jaw.  He is not sure how long it has been there, but probably in the last several months.  It is on the RIGHT side, and he first noted it when it got a cut on it, but that healed.  There has been no ulceration or drainage.  No loose teeth in the area.    Past Medical History -   Patient Active Problem List   Diagnosis     Intermittent asthma     Hyperlipidemia LDL goal <100     Chronic nonallergic rhinitis     Diverticulosis     GERD (gastroesophageal reflux disease)     Generalized anxiety disorder     LLOYD (obstructive sleep apnea)- mild (AHI 11)     Intracranial arachnoid cyst     Facet arthritis of cervical region     Acquired hypothyroidism     Bipolar 2 disorder (H)      Chronic midline low back pain without sciatica     Irritable bowel syndrome with diarrhea     B12 deficiency     Essential hypertension with goal blood pressure less than 140/90     Chronic, continuous use of opioids     Ankylosing spondylitis of sacral region (H)     Morbid obesity due to hypertriglyceridemia (H)     Fatty infiltration of liver     DDD (degenerative disc disease), lumbar     Type 2 diabetes mellitus with complication, without long-term current use of insulin (H)     Peripheral polyneuropathy     History of pulmonary embolism     Ingrown toenail     Hypertriglyceridemia     Gastroparesis     Orthostatic dizziness     POTS (postural orthostatic tachycardia syndrome)     PHN (postherpetic neuralgia)     Dysautonomia (H)     Chronic pain syndrome     Borderline personality disorder (H)     MDD (major depressive disorder), recurrent severe, without psychosis (H)     Folliculitis     Immunosuppression (H)       Current Medications -   Current Outpatient Medications:      fenofibrate (TRICOR) 48 MG tablet, TAKE 1 TABLET BY MOUTH ONCE DAILY, Disp: 90 tablet, Rfl: 2     acetaminophen 500 MG CAPS, Take 1,000 mg by mouth 2 times daily , Disp: 60 capsule, Rfl:      albuterol (PROAIR HFA/PROVENTIL HFA/VENTOLIN HFA) 108 (90 Base) MCG/ACT inhaler, Inhale 2 puffs into the lungs every 4 hours as needed for shortness of breath / dyspnea or wheezing, Disp: 8.5 g, Rfl: 11     aspirin (ASA) 81 MG tablet, Take 81 mg by mouth daily , Disp: , Rfl:      cetirizine (ZYRTEC) 10 MG tablet, Take 1 tablet (10 mg) by mouth At Bedtime, Disp: 30 tablet, Rfl: 11     cholecalciferol (D3-50) 1250 mcg (75853 units) capsule, TAKE 1 CAPSULE BY MOUTH EVERY 2 WEEKS, Disp: 24 capsule, Rfl: 0     clindamycin (CLINDAMAX) 1 % external gel, For flares, start twice daily, Disp: 60 g, Rfl: 3     clonazePAM (KLONOPIN) 0.5 MG tablet, Take 0.5 mg by mouth 2 times daily as needed , Disp: , Rfl:      cyanocobalamin (CYANOCOBALAMIN) 1000 MCG/ML  injection, INJECT 1 ML INTO THE MUSCLE EVERY 30 DAYS, Disp: 10 mL, Rfl: 0     doxycycline monohydrate (MONODOX) 100 MG capsule, Take 1 capsule (100 mg) by mouth 2 times daily, Disp: 180 capsule, Rfl: 0     dronabinol (MARINOL) 5 MG capsule, Take 1 capsule by mouth 2 times daily, Disp: , Rfl:      DULoxetine (CYMBALTA) 60 MG capsule, Take 120 mg by mouth daily , Disp: , Rfl:      EPINEPHrine (ANY BX GENERIC EQUIV) 0.3 MG/0.3ML injection 2-pack, Inject 0.3 mLs (0.3 mg) into the muscle once as needed for anaphylaxis, Disp: 0.6 mL, Rfl: 3     ergocalciferol (ERGOCALCIFEROL) 1.25 MG (96677 UT) capsule, take 1 capsule by ORAL route every 2 weeks, Disp: , Rfl:      eszopiclone (LUNESTA) 3 MG tablet, Take 3 mg by mouth At Bedtime , Disp: , Rfl:      etanercept (ENBREL SURECLICK) 50 MG/ML autoinjector, Inject 50 mg Subcutaneous once a week . Hold for signs of infection, and seek medical attention., Disp: 4 mL, Rfl: 7     famotidine (PEPCID) 20 MG tablet, Prior to administration of Humira every 2 weeks (Patient taking differently: Take 20 mg by mouth every 7 days Prior to Enbrel Injections to prevent skin reaction), Disp: , Rfl:      fluticasone (FLONASE) 50 MCG/ACT nasal spray, Spray 1 spray into both nostrils daily, Disp: , Rfl:      fluticasone-salmeterol (ADVAIR) 250-50 MCG/DOSE inhaler, Inhale 1 puff into the lungs every 12 hours, Disp: 14 each, Rfl: 11     levothyroxine (SYNTHROID/LEVOTHROID) 75 MCG tablet, TAKE ONE TABLET BY MOUTH EVERY MORNING, Disp: 90 tablet, Rfl: 2     lidocaine (XYLOCAINE) 5 % external ointment, APPLY TOPICALLY 4 TIMES DAILY AS NEEDED FOR PAIN , Disp: 50 g, Rfl: 2     melatonin 3 MG tablet, Take 13 mg by mouth nightly as needed , Disp: , Rfl:      methocarbamol (ROBAXIN) 500 MG tablet, TAKE 1-2 TABLETS BY MOUTH FOUR TIMES DAILY AS NEEDED FOR MUSCLE SPASMS, Disp: 240 tablet, Rfl: 0     metoclopramide (REGLAN) 5 MG tablet, Take 5 mg by mouth 2 times daily, Disp: , Rfl:      metoprolol succinate ER  (TOPROL-XL) 200 MG 24 hr tablet, TAKE ONE TABLET BY MOUTH TWICE DAILY, Disp: 180 tablet, Rfl: 2     montelukast (SINGULAIR) 10 MG tablet, Take 1 tablet (10 mg) by mouth every evening, Disp: 90 tablet, Rfl: 3     nabumetone (RELAFEN) 500 MG tablet, TAKE 1-2 TABLETS BY MOUTH TWICE DAILY WITH FOOD AS NEEDED FOR MODERATE PAIN, Disp: 120 tablet, Rfl: 0     naloxone (NARCAN) 4 MG/0.1ML nasal spray, Spray 1 spray (4 mg) into one nostril alternating nostrils once as needed for opioid reversal every 2-3 minutes until assistance arrives, Disp: 0.2 mL, Rfl: 0     olopatadine (PATADAY) 0.2 % ophthalmic solution, Place 1 drop into both eyes daily, Disp: 2.5 mL, Rfl: 3     omega-3 acid ethyl esters (LOVAZA) 1 g capsule, TAKE 2 CAPSULES BY MOUTH TWICE DAILY, Disp: 360 capsule, Rfl: 2     omeprazole 20 MG tablet, Take 20 mg by mouth daily , Disp: , Rfl:      ondansetron (ZOFRAN-ODT) 8 MG ODT tab, Take 8 mg by mouth every 8 hours as needed for nausea, Disp: , Rfl:      order for DME, Equipment being ordered: lumbosacral belt/brace, Disp: 1 Units, Rfl: 0     order for DME, Respironics REMSTAR 60 Series Auto CPAP 9-13 cm H2O, Wisp nasal mask w/a large cushion and a chinstrap, Disp: , Rfl:      oxyCODONE (ROXICODONE) 5 MG tablet, Take 1 tablet (5 mg) by mouth every 6 hours as needed for pain (maximum 6 tablet(s) per day), Disp: 35 tablet, Rfl: 0     pregabalin (LYRICA) 150 MG capsule, Take 1 capsule (150 mg) by mouth 3 times daily (Patient taking differently: Take 150 mg by mouth 2 times daily), Disp: 270 capsule, Rfl: 1     pyridostigmine (MESTINON) 60 MG tablet, Take 1 tablet by mouth 3 times daily, Disp: , Rfl:      ramipril (ALTACE) 10 MG capsule, TAKE 1 CAPSULE BY MOUTH ONE TIME DAILY, Disp: 90 capsule, Rfl: 2     risperiDONE (RISPERDAL) 0.5 MG tablet, Take 0.5 mg by mouth 2 times daily as needed, Disp: , Rfl:      rizatriptan (MAXALT-MLT) 5 MG ODT, DISSOLVE 1 TABLET BY MOUTH AT ONSET OF HEADACHE, Disp: 30 tablet, Rfl: 0      "rosuvastatin (CRESTOR) 40 MG tablet, TAKE ONE TABLET BY MOUTH ONCE DAILY, Disp: 90 tablet, Rfl: 2     syringe, disposable, (BD TUBERCULIN SYRINGE) 1 ML MISC, Equipment being ordered: 1 ml tuberculin syringes to be used for Vitamin B12 injections., Disp: 12 each, Rfl: 11     syringe/needle, disp, (BD INTEGRA SYRINGE) 25G X 1\" 3 ML MISC, USE FOR VITAMIN B12 INJECTIONS, Disp: 12 each, Rfl: 0     valACYclovir (VALTREX) 500 MG tablet, Take 1 tablet (500 mg) by mouth daily, Disp: 90 tablet, Rfl: 1     vitamin B complex with vitamin C (STRESS TAB) tablet, Take 1 tablet by mouth daily, Disp: , Rfl:      ZINC SULFATE-VITAMIN C MT, Take 1 tablet by mouth daily, Disp: , Rfl:     Allergies -   Allergies   Allergen Reactions     Amoxicillin-Pot Clavulanate Difficulty breathing     Banana Shortness Of Breath     Pt reports organic Banana is okay.      Nitroglycerin Palpitations     Penicillins Anaphylaxis     Provigil [Modafinil] Shortness Of Breath     headache     Gadolinium Hives and Itching     Patient was premedicated for the contrast allergy. He did still have a reaction a few hours after injection. Hives and itching. Dr. Gomez told tech to inform pt he should only have contrast again in the future when premedicated and at a hospital. Not at an outpatient facility.      Ketoconazole      Topical cream caused swelling and itching     Dye [Contrast Dye] Other (See Comments) and Hives     Moderate flushing, CT contrast     Gabapentin      Other reaction(s): hives     Golimumab      Hives, bradycardia, face swelling     Naproxen      Other reaction(s): Bleeding Gums     Neurontin [Gabapentin] Hives     Moderate hives     Nortriptyline Hives     Varicella Virus Vaccine Live      Rash     Flagyl [Metronidazole Hcl] Palpitations and Hives     Latex Rash     Metronidazole Palpitations, Other (See Comments) and Rash     dizziness (versus ciprofloxacin taken at same time)       Social History -   Social History " "    Socioeconomic History     Marital status: Single   Occupational History     Occupation:      Employer: [employer]     Occupation: paraprofessional     Comment: [employer]     Employer: [employer]   Tobacco Use     Smoking status: Never Smoker     Smokeless tobacco: Never Used   Substance and Sexual Activity     Alcohol use: Not Currently     Alcohol/week: 0.0 standard drinks     Comment: occ 1/week     Drug use: No     Comment: synthetic THC for nausea     Sexual activity: Never     Partners: Female     Birth control/protection: None   Other Topics Concern     Parent/sibling w/ CABG, MI or angioplasty before 65F 55M? No     Sleep Concern Yes     Stress Concern Yes     Back Care Yes     Seat Belt Yes     Social Determinants of Health     Intimate Partner Violence: Not At Risk     Fear of Current or Ex-Partner: No     Emotionally Abused: No     Physically Abused: No     Sexually Abused: No       Family History -   Family History   Problem Relation Age of Onset     Musculoskeletal Disorder Mother         back     Anxiety Disorder Mother      Colon Polyps Mother      Ulcerative Colitis Mother         and ischemic small intestine, surgery     Hypertension Mother      Breast Cancer Mother      Osteoporosis Mother      Diabetes Mother         Type 2, Diagnosed in 2014     Depression Mother         Takes Cymbalta to help with chronic pain + depx     Thyroid Disease Mother         Hypothyroidism     Obesity Mother         Under much better control latter half of 2015     Musculoskeletal Disorder Father         back     Substance Abuse Father      Hypertension Father      Hyperlipidemia Father      Depression Father         Off meds for many years. Seems \"ok\"     Heart Disease Maternal Grandmother      Heart Disease Maternal Grandfather      Psychotic Disorder Paternal Grandfather      Suicide Paternal Grandfather      Depression Paternal Grandfather         Pediatrician. Committed suicide by pistol in " 1990.     Musculoskeletal Disorder Brother         back     Depression Brother         Expressed as anger and moodiness     Substance Abuse Brother      Substance Abuse Sister      Depression Sister         Mental Health Therapist, yet no anti-depressants?     Anxiety Disorder Sister         Mental Health Therapist, yet no anti-anxiety meds?     Other Cancer Other         Bladder Cancer - Fatal     Substance Abuse Brother      Colon Cancer No family hx of      Crohn's Disease No family hx of      Anesthesia Reaction No family hx of      Cancer No family hx of         No family history of skin cancer       Review of Systems - As per HPI and PMHx, otherwise 10+ system review of the head and neck, and general constitution is negative.    Physical Exam  /86 (BP Location: Left arm, Patient Position: Sitting, Cuff Size: Adult Large)   Pulse 69   Wt 142.9 kg (315 lb)   SpO2 99%   BMI 36.40 kg/m      General - The patient is well nourished and well developed, and appears to have good nutritional status.  Alert and oriented to person and place, answers questions and cooperates with examination appropriately.   Head and Face - Normocephalic and atraumatic, with no gross asymmetry noted of the contour of the facial features.  The facial nerve is intact, with strong symmetric movements.  Voice and Breathing - The patient was breathing comfortably without the use of accessory muscles. There was no wheezing, stridor, or stertor.  The patients voice was clear and strong, and had appropriate pitch and quality.  Ears - The tympanic membranes are normal in appearance, bony landmarks are intact.  No retraction, perforation, or masses.  No fluid or purulence was seen in the external canal or the middle ear. No evidence of infection of the middle ear or external canal, cerumen was normal in appearance.  Eyes - Extraocular movements intact, and the pupils were reactive to light.  Sclera were not icteric or injected, conjunctiva  were pink and moist.  Mouth - the lesion in question is on the buccal surface of the RIGHT lower second molar, tooth 31 which has a cap or crown on it.  It is a firm about 1cm bony bulge.  Non tender on exam, non fluctuant.  Examination of the oral cavity showed pink, healthy oral mucosa. No lesions or ulcerations noted.  The tongue was mobile and midline, and the dentition were in good condition.    Throat - The walls of the oropharynx were smooth, pink, moist, symmetric, and had no lesions or ulcerations.  The tonsillar pillars and soft palate were symmetric.  The uvula was midline on elevation.      A/P - Joel Pineda is a 46 year old male  (M27.8) Jaw mass  (primary encounter diagnosis)    I am confident that this odontogenic.  It looks like either a odontogenic cyst or possibly a periapical abscess on a previously treated tooth.  Either way the next work up needs to be by dentistry.

## 2022-05-08 ENCOUNTER — TELEPHONE (OUTPATIENT)
Dept: DERMATOLOGY | Facility: CLINIC | Age: 47
End: 2022-05-08
Payer: MEDICARE

## 2022-05-08 DIAGNOSIS — T81.49XA SURGICAL SITE INFECTION: Primary | ICD-10-CM

## 2022-05-08 RX ORDER — LEVOFLOXACIN 750 MG/1
750 TABLET, FILM COATED ORAL DAILY
Qty: 7 TABLET | Refills: 0 | Status: SHIPPED | OUTPATIENT
Start: 2022-05-08 | End: 2022-05-15

## 2022-05-08 NOTE — CONFIDENTIAL NOTE
Received page as resident on call for post-op concern: increased drainage and pain from surgical site    Patient had HS nodule excision on his L buttock on 4/7/22 and it is healing by secondary intention. Says he has had  creamy yellow discharge  from the site as well as severe pain since yesterday. Drainage doesn t have foul odor. No fevers/chills. Is still on his 3 month course of doxy 100 mg BID.     Of note has hx of anaphylaxis to penicillins. Says he had a reaction to a  cousin  of penicillin as well. Says he has taken levofloxacin in the past without issue. Pt aware of tendinitis risk with levofloxacin.    A/P:  Hx concerning for infected surgical site. Is already on doxy but doxy doesn't have great gram negative coverage.  - start levofloxacin 750 mg daily x 7 days  - hold doxy while taking levofloxacin and then restart after finishing the levofloxacin  - gently clean site and change bandage daily   - discussed that he were to develop fevers/chills/feeling systemically unwell, he should go to the ED     Will message nursing staff to help patient set up sooner follow-up appt.     Discussed with Dr. Tilley.     Keyana Welch MD  Dermatology Resident, PGY3

## 2022-05-09 ENCOUNTER — TELEPHONE (OUTPATIENT)
Dept: DERMATOLOGY | Facility: CLINIC | Age: 47
End: 2022-05-09

## 2022-05-09 ENCOUNTER — OFFICE VISIT (OUTPATIENT)
Dept: DERMATOLOGY | Facility: CLINIC | Age: 47
End: 2022-05-09
Payer: MEDICARE

## 2022-05-09 ENCOUNTER — OFFICE VISIT (OUTPATIENT)
Dept: OTOLARYNGOLOGY | Facility: CLINIC | Age: 47
End: 2022-05-09
Payer: MEDICARE

## 2022-05-09 VITALS
BODY MASS INDEX: 36.4 KG/M2 | HEART RATE: 69 BPM | OXYGEN SATURATION: 99 % | SYSTOLIC BLOOD PRESSURE: 130 MMHG | DIASTOLIC BLOOD PRESSURE: 86 MMHG | WEIGHT: 315 LBS

## 2022-05-09 DIAGNOSIS — L73.2 HIDRADENITIS SUPPURATIVA: Primary | ICD-10-CM

## 2022-05-09 DIAGNOSIS — M27.8 JAW MASS: Primary | ICD-10-CM

## 2022-05-09 PROCEDURE — 87070 CULTURE OTHR SPECIMN AEROBIC: CPT | Performed by: DERMATOLOGY

## 2022-05-09 PROCEDURE — 17250 CHEM CAUT OF GRANLTJ TISSUE: CPT | Mod: 58 | Performed by: DERMATOLOGY

## 2022-05-09 PROCEDURE — 99212 OFFICE O/P EST SF 10 MIN: CPT | Performed by: OTOLARYNGOLOGY

## 2022-05-09 PROCEDURE — 99024 POSTOP FOLLOW-UP VISIT: CPT | Performed by: DERMATOLOGY

## 2022-05-09 ASSESSMENT — PAIN SCALES - GENERAL: PAINLEVEL: MODERATE PAIN (4)

## 2022-05-09 NOTE — NURSING NOTE
Joel Pineda's goals for this visit include:   Chief Complaint   Patient presents with     Derm Problem     Possible infection to spot on buttock from prior HS excision with white discharge and pain started on Levaquin yesterday       He requests these members of his care team be copied on today's visit information:     PCP: Ksenia Lyles    Referring Provider:  No referring provider defined for this encounter.    There were no vitals taken for this visit.    Do you need any medication refills at today's visit? Virginia Blankenship LPN

## 2022-05-09 NOTE — LETTER
5/9/2022         RE: Joel Pineda  [address] [place] [address]        Dear Colleague,    Thank you for referring your patient, Joel Pineda, to the [facility]. Please see a copy of my visit note below.    [facility] Dermatology Note  Encounter Date: May 9, 2022  Office Visit     Dermatology Problem List:  1. Immunosuppresion for ankylosing spondylitis, on Enbrel.  2. Acne vulgaris s/p Accutane in 1990's  3. Folliculitis  - BPO wash, Hibiclens, clobetasol for flares  4. HS - active lesion on the left buttock, excision 4/7/22  - doxy 100mg BID x 3 months (initiated 2/22/22); BPO wash/Hibiclens; topical clindamycin   5. Repigmented nevus - lower back - Will obtained record - Recheck with Dr. Tilley   6. Cyst - left chest pending excision with Dr. Tilley  ____________________________________________     Assessment & Plan:     # Hidradenitis suppurativa - left buttock s/p excision 4/7/22. Granulation surgical site on the left buttock.   - HOLD Doxycycline 100 mg BID for now. Will restart after course of levofloxacin.   - Continue levofloxacin 750 mg daily x 7 days  - Continue BPO wash BID   - Continue bleach bath   - Culture was taken today.   - Silver Nitrate applicators were used to apply silver nitrate to the granulation tissue, taking care not to touch the surrounding skin.  Treated tissue turned gray and hemostasis was obtained on contact. The patient tolerated the procedure without immediate complication.   Petroleum jelly and a dressing was applied. Wound care instructions were reviewed.       # History of diarrhea intermittently associated with HS   - Was referred to Gastroenterology (already seeing [place] [facility])     Procedures Performed:   none    Follow-up: May 25 for a recheck.     Staff and Scribe:     Scribe Disclosure:   Rodri DEVRIES, am serving as a scribe to document services personally performed by this physician, Dr. Drake Tilley, based on  data collection and the provider's statements to me.       Provider Disclosure:   The documentation recorded by the scribe accurately reflects the services I personally performed and the decisions made by me.    Drake Tilley DO    Department of Dermatology  [facility]: Phone: [phone], Fax:[phone]  [facility]: Phone: [phone], Fax: [phone]    ____________________________________________    CC: Derm Problem (Possible infection to spot on buttock from prior HS excision with white discharge and pain started on Levaquin yesterday)    HPI:  Mr. Joel Pineda is a(n) 46 year old male who presents today as a return patient for HS.     Last seen on 4/26/22 with Dr. Gonzales for HS. At that time, patient to finish Doxycycline 100 mg BID.     Today, patient notes possible infection on the buttock s/p HS excision. Notes a reduction in white discharge after starting on Levaquin yesterday. Notes OTC hydrocortisone cause some burning sensation. Currently using medipore H tape.     Patient is otherwise feeling well, without additional skin concerns.    Labs Reviewed:  N/A    Physical Exam:  Vitals: There were no vitals taken for this visit.  SKIN: Focused examination of buttocks was performed.  - 2.5 cm ovoid ulcer on the left medial buttock: granulation tissue present on the center of the ulcer. Adjacent skin with minimal edema and erythema, minimal pain with tenderness to palpation.   - No other lesions of concern on areas examined.     Medications:  Current Outpatient Medications   Medication     acetaminophen 500 MG CAPS     albuterol (PROAIR HFA/PROVENTIL HFA/VENTOLIN HFA) 108 (90 Base) MCG/ACT inhaler     aspirin (ASA) 81 MG tablet     cetirizine (ZYRTEC) 10 MG tablet     cholecalciferol (D3-50) 1250 mcg (32242 units) capsule     clindamycin (CLINDAMAX) 1 % external gel     clonazePAM  "(KLONOPIN) 0.5 MG tablet     cyanocobalamin (CYANOCOBALAMIN) 1000 MCG/ML injection     doxycycline monohydrate (MONODOX) 100 MG capsule     dronabinol (MARINOL) 5 MG capsule     DULoxetine (CYMBALTA) 60 MG capsule     EPINEPHrine (ANY BX GENERIC EQUIV) 0.3 MG/0.3ML injection 2-pack     ergocalciferol (ERGOCALCIFEROL) 1.25 MG (43949 UT) capsule     eszopiclone (LUNESTA) 3 MG tablet     etanercept (ENBREL SURECLICK) 50 MG/ML autoinjector     famotidine (PEPCID) 20 MG tablet     fenofibrate (TRICOR) 48 MG tablet     fluticasone (FLONASE) 50 MCG/ACT nasal spray     fluticasone-salmeterol (ADVAIR) 250-50 MCG/DOSE inhaler     levofloxacin (LEVAQUIN) 750 MG tablet     levothyroxine (SYNTHROID/LEVOTHROID) 75 MCG tablet     lidocaine (XYLOCAINE) 5 % external ointment     melatonin 3 MG tablet     methocarbamol (ROBAXIN) 500 MG tablet     metoclopramide (REGLAN) 5 MG tablet     metoprolol succinate ER (TOPROL-XL) 200 MG 24 hr tablet     montelukast (SINGULAIR) 10 MG tablet     nabumetone (RELAFEN) 500 MG tablet     naloxone (NARCAN) 4 MG/0.1ML nasal spray     olopatadine (PATADAY) 0.2 % ophthalmic solution     omega-3 acid ethyl esters (LOVAZA) 1 g capsule     omeprazole 20 MG tablet     ondansetron (ZOFRAN-ODT) 8 MG ODT tab     order for DME     order for DME     oxyCODONE (ROXICODONE) 5 MG tablet     pregabalin (LYRICA) 150 MG capsule     pyridostigmine (MESTINON) 60 MG tablet     ramipril (ALTACE) 10 MG capsule     risperiDONE (RISPERDAL) 0.5 MG tablet     rizatriptan (MAXALT-MLT) 5 MG ODT     rosuvastatin (CRESTOR) 40 MG tablet     syringe, disposable, (BD TUBERCULIN SYRINGE) 1 ML MISC     syringe/needle, disp, (BD INTEGRA SYRINGE) 25G X 1\" 3 ML MISC     valACYclovir (VALTREX) 500 MG tablet     vitamin B complex with vitamin C (STRESS TAB) tablet     ZINC SULFATE-VITAMIN C MT     No current facility-administered medications for this visit.      Past Medical History:   Patient Active Problem List   Diagnosis     " Intermittent asthma     Hyperlipidemia LDL goal <100     Chronic nonallergic rhinitis     Diverticulosis     GERD (gastroesophageal reflux disease)     Generalized anxiety disorder     LLOYD (obstructive sleep apnea)- mild (AHI 11)     Intracranial arachnoid cyst     Facet arthritis of cervical region     Acquired hypothyroidism     Bipolar 2 disorder (H)     Chronic midline low back pain without sciatica     Irritable bowel syndrome with diarrhea     B12 deficiency     Essential hypertension with goal blood pressure less than 140/90     Chronic, continuous use of opioids     Ankylosing spondylitis of sacral region (H)     Morbid obesity due to hypertriglyceridemia (H)     Fatty infiltration of liver     DDD (degenerative disc disease), lumbar     Type 2 diabetes mellitus with complication, without long-term current use of insulin (H)     Peripheral polyneuropathy     History of pulmonary embolism     Ingrown toenail     Hypertriglyceridemia     Gastroparesis     Orthostatic dizziness     POTS (postural orthostatic tachycardia syndrome)     PHN (postherpetic neuralgia)     Dysautonomia (H)     Chronic pain syndrome     Borderline personality disorder (H)     MDD (major depressive disorder), recurrent severe, without psychosis (H)     Folliculitis     Immunosuppression (H)     Past Medical History:   Diagnosis Date     Acne      Acquired hypothyroidism      Allergic state      Ankylosing spondylitis lumbar region (H)      Ankylosing spondylitis of sacral region (H)      Anxiety      Bipolar 2 disorder (H)      Chest pain     Chest pain, regulated w/BP meds. Clear arteries.     Chronic pain      DDD (degenerative disc disease), lumbar      Depressive disorder      Diabetes (H)      Diverticulosis      Facet arthritis of cervical region      Gastroesophageal reflux disease      Hypertension      IBS (irritable bowel syndrome)      Intracranial arachnoid cyst      Major depressive disorder, recurrent episode (H)      Multiple psych providers - they manage meds August 2015: Provigil induced severe mood dis-function      Major depressive disorder, recurrent episode, severe (H) 12/2/2020     MDD (major depressive disorder), recurrent severe, without psychosis (H) 7/21/2021     LLOYD (obstructive sleep apnea)- mild (AHI 11)      Polyneuropathy      Pulmonary embolism (H)      Skin exam, screening for cancer 12/3/2013     Sleep apnea      Uncomplicated asthma         CC No referring provider defined for this encounter. on close of this encounter.      Again, thank you for allowing me to participate in the care of your patient.        Sincerely,        Drake Tilley MD

## 2022-05-09 NOTE — TELEPHONE ENCOUNTER
RE: post op infection - needs sooner clinic follow-up  Received: Today  Shaniqua Maya RN Gabriel, Cassandra M, RN; Keyana Welch MD; P [facility]  He has been scheduled for today with Dr Treva Maya RN             Previous Messages     Keyana Welch MD Gabriel, Cassandra M, RN; P [facility]  Should be seen by Dr. Tilley     ----- Message -----   From: Shaniqua Maya RN   Sent: 5/9/2022  11:18 AM CDT   To: Keyana Welch MD, *   Subject: RE: post op infection - needs sooner clinic *     To clarify, should he be seen by Treva or another provider?  I have opening with Dr Null as soon as tomorrow.  Thanks!   Shaniqua Maya RN       ----- Message -----   From: Keyana Welch MD   Sent: 5/8/2022  10:08 AM CDT   To: [facility]   Subject: post op infection - needs sooner clinic foll*     Hi all!     This patient called over the weekend with likely post-op infection. He is scheduled with Dr. Tilley for follow-up 5/25. Per Dr. Tilley, can we move his follow-up sooner?     Thanks!     Keyana Welch MD

## 2022-05-09 NOTE — NURSING NOTE
"Chief Complaint   Patient presents with     Consult     Lump on Jaw     Tinnitus     Dizziness       Vitals:    05/09/22 1211   BP: 130/86   BP Location: Left arm   Patient Position: Sitting   Cuff Size: Adult Large   Pulse: 69   SpO2: 99%   Weight: 142.9 kg (315 lb)     Wt Readings from Last 1 Encounters:   05/09/22 142.9 kg (315 lb)     Ht Readings from Last 1 Encounters:   12/30/21 1.981 m (6' 6\")       Laura Everett [facility], 5/9/2022 12:13 PM    "

## 2022-05-09 NOTE — PROGRESS NOTES
[facility] Dermatology Note  Encounter Date: May 9, 2022  Office Visit     Dermatology Problem List:  1. Immunosuppresion for ankylosing spondylitis, on Enbrel.  2. Acne vulgaris s/p Accutane in 1990's  3. Folliculitis  - BPO wash, Hibiclens, clobetasol for flares  4. HS - active lesion on the left buttock, excision 4/7/22  - doxy 100mg BID x 3 months (initiated 2/22/22); BPO wash/Hibiclens; topical clindamycin   5. Repigmented nevus - lower back - Will obtained record - Recheck with Dr. Tilley   6. Cyst - left chest pending excision with Dr. Tilley  ____________________________________________     Assessment & Plan:     # Hidradenitis suppurativa - left buttock s/p excision 4/7/22. Granulation surgical site on the left buttock.   - HOLD Doxycycline 100 mg BID for now. Will restart after course of levofloxacin.   - Continue levofloxacin 750 mg daily x 7 days  - Continue BPO wash BID   - Continue bleach bath   - Culture was taken today.   - Silver Nitrate applicators were used to apply silver nitrate to the granulation tissue, taking care not to touch the surrounding skin.  Treated tissue turned gray and hemostasis was obtained on contact. The patient tolerated the procedure without immediate complication.   Petroleum jelly and a dressing was applied. Wound care instructions were reviewed.       # History of diarrhea intermittently associated with HS   - Was referred to Gastroenterology (already seeing [place] [facility])     Procedures Performed:   none    Follow-up: May 25 for a recheck.     Staff and Scribe:     Scribe Disclosure:   Rodri DEVRIES, am serving as a scribe to document services personally performed by this physician, Dr. Drake Tilley, based on data collection and the provider's statements to me.       Provider Disclosure:   The documentation recorded by the scribe accurately reflects the services I personally performed and the decisions made by me.    Drake Tilley, DO  Assistant  Professor  Department of Dermatology  [facility]: Phone: [phone], Fax:[phone]  [facility]: Phone: [phone], Fax: [phone]    ____________________________________________    CC: Derm Problem (Possible infection to spot on buttock from prior HS excision with white discharge and pain started on Levaquin yesterday)    HPI:  Mr. Joel Pineda is a(n) 46 year old male who presents today as a return patient for HS.     Last seen on 4/26/22 with Dr. Gonzales for HS. At that time, patient to finish Doxycycline 100 mg BID.     Today, patient notes possible infection on the buttock s/p HS excision. Notes a reduction in white discharge after starting on Levaquin yesterday. Notes OTC hydrocortisone cause some burning sensation. Currently using medipore H tape.     Patient is otherwise feeling well, without additional skin concerns.    Labs Reviewed:  N/A    Physical Exam:  Vitals: There were no vitals taken for this visit.  SKIN: Focused examination of buttocks was performed.  - 2.5 cm ovoid ulcer on the left medial buttock: granulation tissue present on the center of the ulcer. Adjacent skin with minimal edema and erythema, minimal pain with tenderness to palpation.   - No other lesions of concern on areas examined.     Medications:  Current Outpatient Medications   Medication     acetaminophen 500 MG CAPS     albuterol (PROAIR HFA/PROVENTIL HFA/VENTOLIN HFA) 108 (90 Base) MCG/ACT inhaler     aspirin (ASA) 81 MG tablet     cetirizine (ZYRTEC) 10 MG tablet     cholecalciferol (D3-50) 1250 mcg (14542 units) capsule     clindamycin (CLINDAMAX) 1 % external gel     clonazePAM (KLONOPIN) 0.5 MG tablet     cyanocobalamin (CYANOCOBALAMIN) 1000 MCG/ML injection     doxycycline monohydrate (MONODOX) 100 MG capsule     dronabinol (MARINOL) 5 MG capsule     DULoxetine (CYMBALTA) 60 MG capsule     EPINEPHrine (ANY BX  "GENERIC EQUIV) 0.3 MG/0.3ML injection 2-pack     ergocalciferol (ERGOCALCIFEROL) 1.25 MG (50458 UT) capsule     eszopiclone (LUNESTA) 3 MG tablet     etanercept (ENBREL SURECLICK) 50 MG/ML autoinjector     famotidine (PEPCID) 20 MG tablet     fenofibrate (TRICOR) 48 MG tablet     fluticasone (FLONASE) 50 MCG/ACT nasal spray     fluticasone-salmeterol (ADVAIR) 250-50 MCG/DOSE inhaler     levofloxacin (LEVAQUIN) 750 MG tablet     levothyroxine (SYNTHROID/LEVOTHROID) 75 MCG tablet     lidocaine (XYLOCAINE) 5 % external ointment     melatonin 3 MG tablet     methocarbamol (ROBAXIN) 500 MG tablet     metoclopramide (REGLAN) 5 MG tablet     metoprolol succinate ER (TOPROL-XL) 200 MG 24 hr tablet     montelukast (SINGULAIR) 10 MG tablet     nabumetone (RELAFEN) 500 MG tablet     naloxone (NARCAN) 4 MG/0.1ML nasal spray     olopatadine (PATADAY) 0.2 % ophthalmic solution     omega-3 acid ethyl esters (LOVAZA) 1 g capsule     omeprazole 20 MG tablet     ondansetron (ZOFRAN-ODT) 8 MG ODT tab     order for DME     order for DME     oxyCODONE (ROXICODONE) 5 MG tablet     pregabalin (LYRICA) 150 MG capsule     pyridostigmine (MESTINON) 60 MG tablet     ramipril (ALTACE) 10 MG capsule     risperiDONE (RISPERDAL) 0.5 MG tablet     rizatriptan (MAXALT-MLT) 5 MG ODT     rosuvastatin (CRESTOR) 40 MG tablet     syringe, disposable, (BD TUBERCULIN SYRINGE) 1 ML MISC     syringe/needle, disp, (BD INTEGRA SYRINGE) 25G X 1\" 3 ML MISC     valACYclovir (VALTREX) 500 MG tablet     vitamin B complex with vitamin C (STRESS TAB) tablet     ZINC SULFATE-VITAMIN C MT     No current facility-administered medications for this visit.      Past Medical History:   Patient Active Problem List   Diagnosis     Intermittent asthma     Hyperlipidemia LDL goal <100     Chronic nonallergic rhinitis     Diverticulosis     GERD (gastroesophageal reflux disease)     Generalized anxiety disorder     LLOYD (obstructive sleep apnea)- mild (AHI 11)     Intracranial " arachnoid cyst     Facet arthritis of cervical region     Acquired hypothyroidism     Bipolar 2 disorder (H)     Chronic midline low back pain without sciatica     Irritable bowel syndrome with diarrhea     B12 deficiency     Essential hypertension with goal blood pressure less than 140/90     Chronic, continuous use of opioids     Ankylosing spondylitis of sacral region (H)     Morbid obesity due to hypertriglyceridemia (H)     Fatty infiltration of liver     DDD (degenerative disc disease), lumbar     Type 2 diabetes mellitus with complication, without long-term current use of insulin (H)     Peripheral polyneuropathy     History of pulmonary embolism     Ingrown toenail     Hypertriglyceridemia     Gastroparesis     Orthostatic dizziness     POTS (postural orthostatic tachycardia syndrome)     PHN (postherpetic neuralgia)     Dysautonomia (H)     Chronic pain syndrome     Borderline personality disorder (H)     MDD (major depressive disorder), recurrent severe, without psychosis (H)     Folliculitis     Immunosuppression (H)     Past Medical History:   Diagnosis Date     Acne      Acquired hypothyroidism      Allergic state      Ankylosing spondylitis lumbar region (H)      Ankylosing spondylitis of sacral region (H)      Anxiety      Bipolar 2 disorder (H)      Chest pain     Chest pain, regulated w/BP meds. Clear arteries.     Chronic pain      DDD (degenerative disc disease), lumbar      Depressive disorder      Diabetes (H)      Diverticulosis      Facet arthritis of cervical region      Gastroesophageal reflux disease      Hypertension      IBS (irritable bowel syndrome)      Intracranial arachnoid cyst      Major depressive disorder, recurrent episode (H)     Multiple psych providers - they manage meds August 2015: Provigil induced severe mood dis-function      Major depressive disorder, recurrent episode, severe (H) 12/2/2020     MDD (major depressive disorder), recurrent severe, without psychosis (H)  7/21/2021     LLOYD (obstructive sleep apnea)- mild (AHI 11)      Polyneuropathy      Pulmonary embolism (H)      Skin exam, screening for cancer 12/3/2013     Sleep apnea      Uncomplicated asthma         CC No referring provider defined for this encounter. on close of this encounter.

## 2022-05-09 NOTE — LETTER
5/9/2022         RE: Joel Pineda  [address] [facility] [place] [address]        Dear Colleague,    Thank you for referring your patient, Joel Pineda, to the [facility]. Please see a copy of my visit note below.    History of Present Illness - Joel Pineda is a very pleasant 46 year old male last seen on 5/22/2020, for RIGHT sided ear and orbital pain following dental surgery.  I felt that it was acute temporomandibular disease.  Although, he did have a history of functional endoscopic sinus surgery.  He had an ethmoidectomy in 2002, and rhinoplasty in 2010.  But no previous ear surgery.  He has gastroparesis, POTS, and autonomic insufficiency.    He had come back in May 2019 for ear symptoms.  He tells me that since October of 2018 he has had recurrent ear infections.  He has been on Humira for his ankylosing spondylitis.  He tells me that he has had some ear canal infections, but also thinks he has had fluid in the middle ear as well.  He has been treated for these by urgent care, PCP, and Kj Lyles as well.    Due to likely chronic otitis externa due to immunosuppression, I placed him on long term suppression with floxin and clotrimazole twice weekly long term.    He as come in with questions about worsening eustachian tube dysfunction and fullness in the ears.  But also some more nasal congestion  He is using Zyrtec and neilmed sinus rinse more recently.  He has known TMJ and uses a .    Howevver, he is here for a new issue, a lump on the jaw.  He is not sure how long it has been there, but probably in the last several months.  It is on the RIGHT side, and he first noted it when it got a cut on it, but that healed.  There has been no ulceration or drainage.  No loose teeth in the area.    Past Medical History -   Patient Active Problem List   Diagnosis     Intermittent asthma     Hyperlipidemia LDL goal <100     Chronic nonallergic rhinitis     Diverticulosis      GERD (gastroesophageal reflux disease)     Generalized anxiety disorder     LLOYD (obstructive sleep apnea)- mild (AHI 11)     Intracranial arachnoid cyst     Facet arthritis of cervical region     Acquired hypothyroidism     Bipolar 2 disorder (H)     Chronic midline low back pain without sciatica     Irritable bowel syndrome with diarrhea     B12 deficiency     Essential hypertension with goal blood pressure less than 140/90     Chronic, continuous use of opioids     Ankylosing spondylitis of sacral region (H)     Morbid obesity due to hypertriglyceridemia (H)     Fatty infiltration of liver     DDD (degenerative disc disease), lumbar     Type 2 diabetes mellitus with complication, without long-term current use of insulin (H)     Peripheral polyneuropathy     History of pulmonary embolism     Ingrown toenail     Hypertriglyceridemia     Gastroparesis     Orthostatic dizziness     POTS (postural orthostatic tachycardia syndrome)     PHN (postherpetic neuralgia)     Dysautonomia (H)     Chronic pain syndrome     Borderline personality disorder (H)     MDD (major depressive disorder), recurrent severe, without psychosis (H)     Folliculitis     Immunosuppression (H)       Current Medications -   Current Outpatient Medications:      fenofibrate (TRICOR) 48 MG tablet, TAKE 1 TABLET BY MOUTH ONCE DAILY, Disp: 90 tablet, Rfl: 2     acetaminophen 500 MG CAPS, Take 1,000 mg by mouth 2 times daily , Disp: 60 capsule, Rfl:      albuterol (PROAIR HFA/PROVENTIL HFA/VENTOLIN HFA) 108 (90 Base) MCG/ACT inhaler, Inhale 2 puffs into the lungs every 4 hours as needed for shortness of breath / dyspnea or wheezing, Disp: 8.5 g, Rfl: 11     aspirin (ASA) 81 MG tablet, Take 81 mg by mouth daily , Disp: , Rfl:      cetirizine (ZYRTEC) 10 MG tablet, Take 1 tablet (10 mg) by mouth At Bedtime, Disp: 30 tablet, Rfl: 11     cholecalciferol (D3-50) 1250 mcg (56665 units) capsule, TAKE 1 CAPSULE BY MOUTH EVERY 2 WEEKS, Disp: 24 capsule, Rfl:  0     clindamycin (CLINDAMAX) 1 % external gel, For flares, start twice daily, Disp: 60 g, Rfl: 3     clonazePAM (KLONOPIN) 0.5 MG tablet, Take 0.5 mg by mouth 2 times daily as needed , Disp: , Rfl:      cyanocobalamin (CYANOCOBALAMIN) 1000 MCG/ML injection, INJECT 1 ML INTO THE MUSCLE EVERY 30 DAYS, Disp: 10 mL, Rfl: 0     doxycycline monohydrate (MONODOX) 100 MG capsule, Take 1 capsule (100 mg) by mouth 2 times daily, Disp: 180 capsule, Rfl: 0     dronabinol (MARINOL) 5 MG capsule, Take 1 capsule by mouth 2 times daily, Disp: , Rfl:      DULoxetine (CYMBALTA) 60 MG capsule, Take 120 mg by mouth daily , Disp: , Rfl:      EPINEPHrine (ANY BX GENERIC EQUIV) 0.3 MG/0.3ML injection 2-pack, Inject 0.3 mLs (0.3 mg) into the muscle once as needed for anaphylaxis, Disp: 0.6 mL, Rfl: 3     ergocalciferol (ERGOCALCIFEROL) 1.25 MG (44838 UT) capsule, take 1 capsule by ORAL route every 2 weeks, Disp: , Rfl:      eszopiclone (LUNESTA) 3 MG tablet, Take 3 mg by mouth At Bedtime , Disp: , Rfl:      etanercept (ENBREL SURECLICK) 50 MG/ML autoinjector, Inject 50 mg Subcutaneous once a week . Hold for signs of infection, and seek medical attention., Disp: 4 mL, Rfl: 7     famotidine (PEPCID) 20 MG tablet, Prior to administration of Humira every 2 weeks (Patient taking differently: Take 20 mg by mouth every 7 days Prior to Enbrel Injections to prevent skin reaction), Disp: , Rfl:      fluticasone (FLONASE) 50 MCG/ACT nasal spray, Spray 1 spray into both nostrils daily, Disp: , Rfl:      fluticasone-salmeterol (ADVAIR) 250-50 MCG/DOSE inhaler, Inhale 1 puff into the lungs every 12 hours, Disp: 14 each, Rfl: 11     levothyroxine (SYNTHROID/LEVOTHROID) 75 MCG tablet, TAKE ONE TABLET BY MOUTH EVERY MORNING, Disp: 90 tablet, Rfl: 2     lidocaine (XYLOCAINE) 5 % external ointment, APPLY TOPICALLY 4 TIMES DAILY AS NEEDED FOR PAIN , Disp: 50 g, Rfl: 2     melatonin 3 MG tablet, Take 13 mg by mouth nightly as needed , Disp: , Rfl:       methocarbamol (ROBAXIN) 500 MG tablet, TAKE 1-2 TABLETS BY MOUTH FOUR TIMES DAILY AS NEEDED FOR MUSCLE SPASMS, Disp: 240 tablet, Rfl: 0     metoclopramide (REGLAN) 5 MG tablet, Take 5 mg by mouth 2 times daily, Disp: , Rfl:      metoprolol succinate ER (TOPROL-XL) 200 MG 24 hr tablet, TAKE ONE TABLET BY MOUTH TWICE DAILY, Disp: 180 tablet, Rfl: 2     montelukast (SINGULAIR) 10 MG tablet, Take 1 tablet (10 mg) by mouth every evening, Disp: 90 tablet, Rfl: 3     nabumetone (RELAFEN) 500 MG tablet, TAKE 1-2 TABLETS BY MOUTH TWICE DAILY WITH FOOD AS NEEDED FOR MODERATE PAIN, Disp: 120 tablet, Rfl: 0     naloxone (NARCAN) 4 MG/0.1ML nasal spray, Spray 1 spray (4 mg) into one nostril alternating nostrils once as needed for opioid reversal every 2-3 minutes until assistance arrives, Disp: 0.2 mL, Rfl: 0     olopatadine (PATADAY) 0.2 % ophthalmic solution, Place 1 drop into both eyes daily, Disp: 2.5 mL, Rfl: 3     omega-3 acid ethyl esters (LOVAZA) 1 g capsule, TAKE 2 CAPSULES BY MOUTH TWICE DAILY, Disp: 360 capsule, Rfl: 2     omeprazole 20 MG tablet, Take 20 mg by mouth daily , Disp: , Rfl:      ondansetron (ZOFRAN-ODT) 8 MG ODT tab, Take 8 mg by mouth every 8 hours as needed for nausea, Disp: , Rfl:      order for DME, Equipment being ordered: lumbosacral belt/brace, Disp: 1 Units, Rfl: 0     order for DME, Respironics REMSTAR 60 Series Auto CPAP 9-13 cm H2O, Wisp nasal mask w/a large cushion and a chinstrap, Disp: , Rfl:      oxyCODONE (ROXICODONE) 5 MG tablet, Take 1 tablet (5 mg) by mouth every 6 hours as needed for pain (maximum 6 tablet(s) per day), Disp: 35 tablet, Rfl: 0     pregabalin (LYRICA) 150 MG capsule, Take 1 capsule (150 mg) by mouth 3 times daily (Patient taking differently: Take 150 mg by mouth 2 times daily), Disp: 270 capsule, Rfl: 1     pyridostigmine (MESTINON) 60 MG tablet, Take 1 tablet by mouth 3 times daily, Disp: , Rfl:      ramipril (ALTACE) 10 MG capsule, TAKE 1 CAPSULE BY MOUTH ONE TIME  "DAILY, Disp: 90 capsule, Rfl: 2     risperiDONE (RISPERDAL) 0.5 MG tablet, Take 0.5 mg by mouth 2 times daily as needed, Disp: , Rfl:      rizatriptan (MAXALT-MLT) 5 MG ODT, DISSOLVE 1 TABLET BY MOUTH AT ONSET OF HEADACHE, Disp: 30 tablet, Rfl: 0     rosuvastatin (CRESTOR) 40 MG tablet, TAKE ONE TABLET BY MOUTH ONCE DAILY, Disp: 90 tablet, Rfl: 2     syringe, disposable, (BD TUBERCULIN SYRINGE) 1 ML MISC, Equipment being ordered: 1 ml tuberculin syringes to be used for Vitamin B12 injections., Disp: 12 each, Rfl: 11     syringe/needle, disp, (BD INTEGRA SYRINGE) 25G X 1\" 3 ML MISC, USE FOR VITAMIN B12 INJECTIONS, Disp: 12 each, Rfl: 0     valACYclovir (VALTREX) 500 MG tablet, Take 1 tablet (500 mg) by mouth daily, Disp: 90 tablet, Rfl: 1     vitamin B complex with vitamin C (STRESS TAB) tablet, Take 1 tablet by mouth daily, Disp: , Rfl:      ZINC SULFATE-VITAMIN C MT, Take 1 tablet by mouth daily, Disp: , Rfl:     Allergies -   Allergies   Allergen Reactions     Amoxicillin-Pot Clavulanate Difficulty breathing     Banana Shortness Of Breath     Pt reports organic Banana is okay.      Nitroglycerin Palpitations     Penicillins Anaphylaxis     Provigil [Modafinil] Shortness Of Breath     headache     Gadolinium Hives and Itching     Patient was premedicated for the contrast allergy. He did still have a reaction a few hours after injection. Hives and itching. Dr. Gomez told tech to inform pt he should only have contrast again in the future when premedicated and at a hospital. Not at an outpatient facility.      Ketoconazole      Topical cream caused swelling and itching     Dye [Contrast Dye] Other (See Comments) and Hives     Moderate flushing, CT contrast     Gabapentin      Other reaction(s): hives     Golimumab      Hives, bradycardia, face swelling     Naproxen      Other reaction(s): Bleeding Gums     Neurontin [Gabapentin] Hives     Moderate hives     Nortriptyline Hives     Varicella Virus Vaccine Live  " "    Rash     Flagyl [Metronidazole Hcl] Palpitations and Hives     Latex Rash     Metronidazole Palpitations, Other (See Comments) and Rash     dizziness (versus ciprofloxacin taken at same time)       Social History -   Social History     Socioeconomic History     Marital status: Single   Occupational History     Occupation:      Employer: [employer]     Occupation: paraprofessional     Comment: [employer]     Employer: [employer]   Tobacco Use     Smoking status: Never Smoker     Smokeless tobacco: Never Used   Substance and Sexual Activity     Alcohol use: Not Currently     Alcohol/week: 0.0 standard drinks     Comment: occ 1/week     Drug use: No     Comment: synthetic THC for nausea     Sexual activity: Never     Partners: Female     Birth control/protection: None   Other Topics Concern     Parent/sibling w/ CABG, MI or angioplasty before 65F 55M? No     Sleep Concern Yes     Stress Concern Yes     Back Care Yes     Seat Belt Yes     Social Determinants of Health     Intimate Partner Violence: Not At Risk     Fear of Current or Ex-Partner: No     Emotionally Abused: No     Physically Abused: No     Sexually Abused: No       Family History -   Family History   Problem Relation Age of Onset     Musculoskeletal Disorder Mother         back     Anxiety Disorder Mother      Colon Polyps Mother      Ulcerative Colitis Mother         and ischemic small intestine, surgery     Hypertension Mother      Breast Cancer Mother      Osteoporosis Mother      Diabetes Mother         Type 2, Diagnosed in 2014     Depression Mother         Takes Cymbalta to help with chronic pain + depx     Thyroid Disease Mother         Hypothyroidism     Obesity Mother         Under much better control latter half of 2015     Musculoskeletal Disorder Father         back     Substance Abuse Father      Hypertension Father      Hyperlipidemia Father      Depression Father         Off meds for many years. Seems \"ok\"     Heart " Disease Maternal Grandmother      Heart Disease Maternal Grandfather      Psychotic Disorder Paternal Grandfather      Suicide Paternal Grandfather      Depression Paternal Grandfather         Pediatrician. Committed suicide by pistol in 1990.     Musculoskeletal Disorder Brother         back     Depression Brother         Expressed as anger and moodiness     Substance Abuse Brother      Substance Abuse Sister      Depression Sister         Mental Health Therapist, yet no anti-depressants?     Anxiety Disorder Sister         Mental Health Therapist, yet no anti-anxiety meds?     Other Cancer Other         Bladder Cancer - Fatal     Substance Abuse Brother      Colon Cancer No family hx of      Crohn's Disease No family hx of      Anesthesia Reaction No family hx of      Cancer No family hx of         No family history of skin cancer       Review of Systems - As per HPI and PMHx, otherwise 10+ system review of the head and neck, and general constitution is negative.    Physical Exam  /86 (BP Location: Left arm, Patient Position: Sitting, Cuff Size: Adult Large)   Pulse 69   Wt 142.9 kg (315 lb)   SpO2 99%   BMI 36.40 kg/m      General - The patient is well nourished and well developed, and appears to have good nutritional status.  Alert and oriented to person and place, answers questions and cooperates with examination appropriately.   Head and Face - Normocephalic and atraumatic, with no gross asymmetry noted of the contour of the facial features.  The facial nerve is intact, with strong symmetric movements.  Voice and Breathing - The patient was breathing comfortably without the use of accessory muscles. There was no wheezing, stridor, or stertor.  The patients voice was clear and strong, and had appropriate pitch and quality.  Ears - The tympanic membranes are normal in appearance, bony landmarks are intact.  No retraction, perforation, or masses.  No fluid or purulence was seen in the external canal or  the middle ear. No evidence of infection of the middle ear or external canal, cerumen was normal in appearance.  Eyes - Extraocular movements intact, and the pupils were reactive to light.  Sclera were not icteric or injected, conjunctiva were pink and moist.  Mouth - the lesion in question is on the buccal surface of the RIGHT lower second molar, tooth 31 which has a cap or crown on it.  It is a firm about 1cm bony bulge.  Non tender on exam, non fluctuant.  Examination of the oral cavity showed pink, healthy oral mucosa. No lesions or ulcerations noted.  The tongue was mobile and midline, and the dentition were in good condition.    Throat - The walls of the oropharynx were smooth, pink, moist, symmetric, and had no lesions or ulcerations.  The tonsillar pillars and soft palate were symmetric.  The uvula was midline on elevation.      A/P - Joel Pineda is a 46 year old male  (M27.8) Jaw mass  (primary encounter diagnosis)    I am confident that this odontogenic.  It looks like either a odontogenic cyst or possibly a periapical abscess on a previously treated tooth.  Either way the next work up needs to be by dentistry.          Again, thank you for allowing me to participate in the care of your patient.        Sincerely,        Chico Jimenez MD

## 2022-05-11 ENCOUNTER — TELEPHONE (OUTPATIENT)
Dept: PALLIATIVE MEDICINE | Facility: CLINIC | Age: 47
End: 2022-05-11

## 2022-05-11 ENCOUNTER — OFFICE VISIT (OUTPATIENT)
Dept: PALLIATIVE MEDICINE | Facility: CLINIC | Age: 47
End: 2022-05-11
Payer: MEDICARE

## 2022-05-11 VITALS — DIASTOLIC BLOOD PRESSURE: 84 MMHG | SYSTOLIC BLOOD PRESSURE: 122 MMHG | HEART RATE: 76 BPM

## 2022-05-11 DIAGNOSIS — G57.10 MERALGIA PARESTHETICA, UNSPECIFIED LATERALITY: Primary | ICD-10-CM

## 2022-05-11 LAB — BACTERIA SKIN AEROBE CULT: NORMAL

## 2022-05-11 ASSESSMENT — PAIN SCALES - GENERAL: PAINLEVEL: MILD PAIN (3)

## 2022-05-11 NOTE — TELEPHONE ENCOUNTER
Accidentally scheduled with new provider. Currently sees colleague fabricio pinto     Pt can be sched for repeat Left LFCN nerve ed of June early July     Discussed setting up follow up with DV in clinic for optimization of care

## 2022-05-11 NOTE — PATIENT INSTRUCTIONS
----------------------------------------------------------------  [facility] Number:  [phone]   Call with any questions about your care and for scheduling assistance.   Calls are returned Monday through Friday between 8 AM and 4:30 PM. We usually get back to you within 2 business days depending on the issue/request.    If we are prescribing your medications:  For opioid medication refills, call the clinic or send a PT PAL message 7 days in advance.  Please include:  Name of requested medication  Name of the pharmacy.  For non-opioid medications, call your pharmacy directly to request a refill. Please allow 3-4 days to be processed.   Per [place] State Law:  All controlled substance prescriptions must be filled within 30 days of being written.    For those controlled substances allowing refills, pickup must occur within 30 days of last fill.      We believe regular attendance is key to your success in our program!    Any time you are unable to keep your appointment we ask that you call us at least 24 hours in advance to cancel.This will allow us to offer the appointment time to another patient.   Multiple missed appointments may lead to dismissal from the clinic.     stable

## 2022-05-12 NOTE — TELEPHONE ENCOUNTER
Patient scheduled 6/20 for procedure and 6/30 for follow up with Stacia Jack NP.    Farida STONE    [facility]

## 2022-05-13 DIAGNOSIS — E55.9 VITAMIN D DEFICIENCY: ICD-10-CM

## 2022-05-14 ENCOUNTER — E-VISIT (OUTPATIENT)
Dept: FAMILY MEDICINE | Facility: CLINIC | Age: 47
End: 2022-05-14
Payer: MEDICARE

## 2022-05-14 DIAGNOSIS — B37.0 THRUSH: Primary | ICD-10-CM

## 2022-05-14 DIAGNOSIS — Z91.018 FOOD ALLERGY: ICD-10-CM

## 2022-05-14 DIAGNOSIS — M51.369 DDD (DEGENERATIVE DISC DISEASE), LUMBAR: ICD-10-CM

## 2022-05-14 PROCEDURE — 99421 OL DIG E/M SVC 5-10 MIN: CPT | Performed by: FAMILY MEDICINE

## 2022-05-15 DIAGNOSIS — M51.369 DDD (DEGENERATIVE DISC DISEASE), LUMBAR: ICD-10-CM

## 2022-05-15 DIAGNOSIS — M45.8 ANKYLOSING SPONDYLITIS OF SACRAL REGION (H): ICD-10-CM

## 2022-05-15 RX ORDER — NYSTATIN 100000/ML
500000 SUSPENSION, ORAL (FINAL DOSE FORM) ORAL 4 TIMES DAILY
Qty: 100 ML | Refills: 0 | Status: SHIPPED | OUTPATIENT
Start: 2022-05-15 | End: 2022-05-17

## 2022-05-17 RX ORDER — CHOLECALCIFEROL (VITAMIN D3) 1250 MCG
CAPSULE ORAL
Qty: 24 CAPSULE | Refills: 0 | Status: SHIPPED | OUTPATIENT
Start: 2022-05-17 | End: 2023-07-03

## 2022-05-17 RX ORDER — EPINEPHRINE 0.3 MG/.3ML
0.3 INJECTION SUBCUTANEOUS
Qty: 0.6 ML | Refills: 3 | Status: SHIPPED | OUTPATIENT
Start: 2022-05-17 | End: 2023-12-05

## 2022-05-17 RX ORDER — METHOCARBAMOL 500 MG/1
TABLET, FILM COATED ORAL
Qty: 240 TABLET | Refills: 0 | Status: SHIPPED | OUTPATIENT
Start: 2022-05-17 | End: 2022-06-27

## 2022-05-17 NOTE — TELEPHONE ENCOUNTER
Routing refill request to provider for review/approval because:  Drug not on the [facility] refill protocol.    Homa Tipton RN, BSN  [facility]

## 2022-05-17 NOTE — TELEPHONE ENCOUNTER
"Requested Prescriptions   Pending Prescriptions Disp Refills    D3-50 1.25 MG (21049 UT) capsule [Pharmacy Med Name: D3-50 Oral Capsule 1.25 MG (74135 UT)] 24 capsule 0     Sig: TAKE ONE CAPSULE BY MOUTH EVERY 2 WEEKS.        Vitamin Supplements (Adult) Protocol Failed - 5/13/2022  2:49 PM        Failed - High dose Vitamin D not ordered        Passed - Recent (12 mo) or future (30 days) visit within the authorizing provider's specialty     Patient has had an office visit with the authorizing provider or a provider within the authorizing providers department within the previous 12 mos or has a future within next 30 days. See \"Patient Info\" tab in inbasket, or \"Choose Columns\" in Meds & Orders section of the refill encounter.              Passed - Medication is active on med list              "

## 2022-05-18 RX ORDER — NABUMETONE 500 MG/1
TABLET, FILM COATED ORAL
Qty: 120 TABLET | Refills: 0 | Status: SHIPPED | OUTPATIENT
Start: 2022-05-18 | End: 2022-06-21

## 2022-05-18 NOTE — TELEPHONE ENCOUNTER
Prescription approved per [facility] Refill Protocol.    Dianne Henderson RN  [facility]

## 2022-05-21 ENCOUNTER — MYC REFILL (OUTPATIENT)
Dept: FAMILY MEDICINE | Facility: CLINIC | Age: 47
End: 2022-05-21
Payer: MEDICARE

## 2022-05-21 DIAGNOSIS — M45.8 ANKYLOSING SPONDYLITIS OF SACRAL REGION (H): ICD-10-CM

## 2022-05-21 DIAGNOSIS — M51.369 DDD (DEGENERATIVE DISC DISEASE), LUMBAR: ICD-10-CM

## 2022-05-22 DIAGNOSIS — B02.9 HERPES ZOSTER WITHOUT COMPLICATION: ICD-10-CM

## 2022-05-23 RX ORDER — OXYCODONE HYDROCHLORIDE 5 MG/1
5 TABLET ORAL EVERY 6 HOURS PRN
Qty: 35 TABLET | Refills: 0 | Status: SHIPPED | OUTPATIENT
Start: 2022-05-23 | End: 2022-06-12

## 2022-05-24 RX ORDER — VALACYCLOVIR HYDROCHLORIDE 500 MG/1
TABLET, FILM COATED ORAL
Qty: 90 TABLET | Refills: 0 | Status: SHIPPED | OUTPATIENT
Start: 2022-05-24 | End: 2022-08-22

## 2022-05-25 ENCOUNTER — OFFICE VISIT (OUTPATIENT)
Dept: DERMATOLOGY | Facility: CLINIC | Age: 47
End: 2022-05-25
Payer: MEDICARE

## 2022-05-25 DIAGNOSIS — L72.0 EPIDERMOID CYST: ICD-10-CM

## 2022-05-25 DIAGNOSIS — L73.2 HIDRADENITIS SUPPURATIVA: Primary | ICD-10-CM

## 2022-05-25 PROCEDURE — 99024 POSTOP FOLLOW-UP VISIT: CPT | Performed by: DERMATOLOGY

## 2022-05-25 ASSESSMENT — PAIN SCALES - GENERAL: PAINLEVEL: MODERATE PAIN (4)

## 2022-05-25 NOTE — NURSING NOTE
Joel Pineda's goals for this visit include:   Chief Complaint   Patient presents with     RECHECK     HS buttocks      Derm Problem     Cyst on chest removal on 6/9/2022 but patient concerned of possibly being infected        He requests these members of his care team be copied on today's visit information:     PCP: Ksenia Lyles    Referring Provider:  No referring provider defined for this encounter.    There were no vitals taken for this visit.    Do you need any medication refills at today's visit? Virginia Blankenship LPN

## 2022-05-25 NOTE — LETTER
5/25/2022         RE: Joel Pineda  [address] [facility] [place] [address]        Dear Colleague,    Thank you for referring your patient, Joel Pineda, to the [facility]. Please see a copy of my visit note below.    [facility] Dermatology Note  Encounter Date: May 25, 2022  Office Visit     Dermatology Problem List:  1. Immunosuppresion for ankylosing spondylitis, on Enbrel.  2. Acne vulgaris s/p Accutane in 1990's  3. Folliculitis  - BPO wash, Hibiclens, clobetasol for flares  4. HS - active lesion on the left buttock, excision 4/7/22  - doxy 100mg BID x 3 months (initiated 2/22/22); BPO wash/Hibiclens; topical clindamycin   5. Repigmented nevus - lower back - Will obtained record - Recheck with Dr. Tilley   6. Cyst - left chest pending excision with Dr. Tilley    ____________________________________________    Assessment & Plan:    # Hidradenitis suppurativa - left buttock s/p excision 4/7/22. Appears to be healing appropriately at this time. Encouraged to continue current plan.  - Continue Doxycycline 100 mg BID for now.  - Continue BPO wash BID   - Continue bleach bath   - Will recheck wound at cyst excision appointment on 6/9/22    # Cyst, left chest. No signs of infection today.    - He remains on doxycycline for HS.    - there may be mild-moderate inflammation in the area of the cyst. I expect this to resolve spontaneously.    - Follow up for excision as scheduled on 6/9/22    Procedures Performed:   None.    Follow-up: 6/9/22 as scheduled for cyst excision and HS, earlier for new or concerning lesions    Staff and Scribe:     Scribe Disclosure:   I, Gera Bustos, am serving as a scribe to document services personally performed by this physician, Dr. Drake Tilley, based on data collection and the provider's statements to me.     Provider Disclosure:   The documentation recorded by the scribe accurately reflects the services I personally performed  "and the decisions made by me.    Drake Tilley DO    Department of Dermatology  [facility]: Phone: [phone], Fax:[phone]  [facility]: Phone: [phone], Fax: [phone]    ____________________________________________    CC: RECHECK (HS buttocks ) and Derm Problem (Cyst on chest removal on 6/9/2022 but patient concerned of possibly being infected )    HPI:  Mr. Joel Pineda is a(n) 46 year old male who presents today as a return patient for a wound check.    Last seen 5/9/22 for an HS excision follow up. Granulation of the surgical site at the left buttock was noted. Patient continued on levofloxacin 750 mg x 7 days, BPO wash BID, bleach baths, and silver nitrate applicators were applied to granulation tissue.    He is currently taking doxycycline, and will be taking this for 2 weeks. He's applying topical lidocaine tid for pain relief. Pain worsens when sitting. He has had some intermittent bleeding that he has noticed.    He also has a cyst on the chest that has recently been growing and turning \"bright red\". He has an excision appointment scheduled for 6/9/22. He is curious if his levofloxacin could have fought off any infection.    Patient is otherwise feeling well, without additional skin concerns.    Labs Reviewed:  N/A    Physical Exam:  Vitals: There were no vitals taken for this visit.  SKIN: Focused examination of the left chest and the left buttock was performed.  - Left Upper Chest: 8 mm well defined subcutaneous papule, minimal adjacent erythema and edema  - Left Buttock: pink sclerotic plaque at site of prior surgery, central superficial erosion with serous crust   - No other lesions of concern on areas examined.     Medications:  Current Outpatient Medications   Medication     nabumetone (RELAFEN) 500 MG tablet     acetaminophen 500 MG CAPS     albuterol (PROAIR " "HFA/PROVENTIL HFA/VENTOLIN HFA) 108 (90 Base) MCG/ACT inhaler     aspirin (ASA) 81 MG tablet     cetirizine (ZYRTEC) 10 MG tablet     cholecalciferol (D3-50) 1250 mcg (75335 units) capsule     clindamycin (CLINDAMAX) 1 % external gel     clonazePAM (KLONOPIN) 0.5 MG tablet     cyanocobalamin (CYANOCOBALAMIN) 1000 MCG/ML injection     doxycycline monohydrate (MONODOX) 100 MG capsule     dronabinol (MARINOL) 5 MG capsule     DULoxetine (CYMBALTA) 60 MG capsule     EPINEPHrine (ANY BX GENERIC EQUIV) 0.3 MG/0.3ML injection 2-pack     ergocalciferol (ERGOCALCIFEROL) 1.25 MG (27798 UT) capsule     eszopiclone (LUNESTA) 3 MG tablet     etanercept (ENBREL SURECLICK) 50 MG/ML autoinjector     famotidine (PEPCID) 20 MG tablet     fenofibrate (TRICOR) 48 MG tablet     fluticasone (FLONASE) 50 MCG/ACT nasal spray     fluticasone-salmeterol (ADVAIR) 250-50 MCG/DOSE inhaler     levothyroxine (SYNTHROID/LEVOTHROID) 75 MCG tablet     lidocaine (XYLOCAINE) 5 % external ointment     lidocaine, viscous, (XYLOCAINE) 2 % solution     melatonin 3 MG tablet     methocarbamol (ROBAXIN) 500 MG tablet     metoclopramide (REGLAN) 5 MG tablet     metoprolol succinate ER (TOPROL-XL) 200 MG 24 hr tablet     montelukast (SINGULAIR) 10 MG tablet     naloxone (NARCAN) 4 MG/0.1ML nasal spray     olopatadine (PATADAY) 0.2 % ophthalmic solution     omega-3 acid ethyl esters (LOVAZA) 1 g capsule     omeprazole 20 MG tablet     ondansetron (ZOFRAN-ODT) 8 MG ODT tab     order for DME     order for DME     oxyCODONE (ROXICODONE) 5 MG tablet     pregabalin (LYRICA) 150 MG capsule     pyridostigmine (MESTINON) 60 MG tablet     ramipril (ALTACE) 10 MG capsule     risperiDONE (RISPERDAL) 0.5 MG tablet     rizatriptan (MAXALT-MLT) 5 MG ODT     rosuvastatin (CRESTOR) 40 MG tablet     syringe, disposable, (BD TUBERCULIN SYRINGE) 1 ML MISC     syringe/needle, disp, (BD INTEGRA SYRINGE) 25G X 1\" 3 ML MISC     valACYclovir (VALTREX) 500 MG tablet     vitamin B " complex with vitamin C (STRESS TAB) tablet     ZINC SULFATE-VITAMIN C MT     No current facility-administered medications for this visit.      Past Medical History:   Patient Active Problem List   Diagnosis     Intermittent asthma     Hyperlipidemia LDL goal <100     Chronic nonallergic rhinitis     Diverticulosis     GERD (gastroesophageal reflux disease)     Generalized anxiety disorder     LLOYD (obstructive sleep apnea)- mild (AHI 11)     Intracranial arachnoid cyst     Facet arthritis of cervical region     Acquired hypothyroidism     Bipolar 2 disorder (H)     Chronic midline low back pain without sciatica     Irritable bowel syndrome with diarrhea     B12 deficiency     Essential hypertension with goal blood pressure less than 140/90     Chronic, continuous use of opioids     Ankylosing spondylitis of sacral region (H)     Morbid obesity due to hypertriglyceridemia (H)     Fatty infiltration of liver     DDD (degenerative disc disease), lumbar     Type 2 diabetes mellitus with complication, without long-term current use of insulin (H)     Peripheral polyneuropathy     History of pulmonary embolism     Ingrown toenail     Hypertriglyceridemia     Gastroparesis     Orthostatic dizziness     POTS (postural orthostatic tachycardia syndrome)     PHN (postherpetic neuralgia)     Dysautonomia (H)     Chronic pain syndrome     Borderline personality disorder (H)     MDD (major depressive disorder), recurrent severe, without psychosis (H)     Folliculitis     Immunosuppression (H)     Past Medical History:   Diagnosis Date     Acne      Acquired hypothyroidism      Allergic state      Ankylosing spondylitis lumbar region (H)      Ankylosing spondylitis of sacral region (H)      Anxiety      Bipolar 2 disorder (H)      Chest pain     Chest pain, regulated w/BP meds. Clear arteries.     Chronic pain      DDD (degenerative disc disease), lumbar      Depressive disorder      Diabetes (H)      Diverticulosis      Facet  arthritis of cervical region      Gastroesophageal reflux disease      Hypertension      IBS (irritable bowel syndrome)      Intracranial arachnoid cyst      Major depressive disorder, recurrent episode (H)     Multiple psych providers - they manage meds August 2015: Provigil induced severe mood dis-function      Major depressive disorder, recurrent episode, severe (H) 12/2/2020     MDD (major depressive disorder), recurrent severe, without psychosis (H) 7/21/2021     LLOYD (obstructive sleep apnea)- mild (AHI 11)      Polyneuropathy      Pulmonary embolism (H)      Skin exam, screening for cancer 12/3/2013     Sleep apnea      Uncomplicated asthma         CC No referring provider defined for this encounter. on close of this encounter.      Again, thank you for allowing me to participate in the care of your patient.        Sincerely,        Drake Tilley MD

## 2022-05-25 NOTE — PROGRESS NOTES
[facility] Dermatology Note  Encounter Date: May 25, 2022  Office Visit     Dermatology Problem List:  1. Immunosuppresion for ankylosing spondylitis, on Enbrel.  2. Acne vulgaris s/p Accutane in 1990's  3. Folliculitis  - BPO wash, Hibiclens, clobetasol for flares  4. HS - active lesion on the left buttock, excision 4/7/22  - doxy 100mg BID x 3 months (initiated 2/22/22); BPO wash/Hibiclens; topical clindamycin   5. Repigmented nevus - lower back - Will obtained record - Recheck with Dr. Tilley   6. Cyst - left chest pending excision with Dr. Tilley    ____________________________________________    Assessment & Plan:    # Hidradenitis suppurativa - left buttock s/p excision 4/7/22. Appears to be healing appropriately at this time. Encouraged to continue current plan.  - Continue Doxycycline 100 mg BID for now.  - Continue BPO wash BID   - Continue bleach bath   - Will recheck wound at cyst excision appointment on 6/9/22    # Cyst, left chest. No signs of infection today.    - He remains on doxycycline for HS.    - there may be mild-moderate inflammation in the area of the cyst. I expect this to resolve spontaneously.    - Follow up for excision as scheduled on 6/9/22    Procedures Performed:   None.    Follow-up: 6/9/22 as scheduled for cyst excision and HS, earlier for new or concerning lesions    Staff and Scribe:     Scribe Disclosure:   I, Gera Bustos, am serving as a scribe to document services personally performed by this physician, Dr. Drake Tilley, based on data collection and the provider's statements to me.     Provider Disclosure:   The documentation recorded by the scribe accurately reflects the services I personally performed and the decisions made by me.    Drake Tilley DO    Department of Dermatology  [facility]: Phone: [phone], Fax:[phone]  [facility]  "Surgery Center: Phone: [phone], Fax: [phone]    ____________________________________________    CC: RECHECK (HS buttocks ) and Derm Problem (Cyst on chest removal on 6/9/2022 but patient concerned of possibly being infected )    HPI:  Mr. Joel Pineda is a(n) 46 year old male who presents today as a return patient for a wound check.    Last seen 5/9/22 for an HS excision follow up. Granulation of the surgical site at the left buttock was noted. Patient continued on levofloxacin 750 mg x 7 days, BPO wash BID, bleach baths, and silver nitrate applicators were applied to granulation tissue.    He is currently taking doxycycline, and will be taking this for 2 weeks. He's applying topical lidocaine tid for pain relief. Pain worsens when sitting. He has had some intermittent bleeding that he has noticed.    He also has a cyst on the chest that has recently been growing and turning \"bright red\". He has an excision appointment scheduled for 6/9/22. He is curious if his levofloxacin could have fought off any infection.    Patient is otherwise feeling well, without additional skin concerns.    Labs Reviewed:  N/A    Physical Exam:  Vitals: There were no vitals taken for this visit.  SKIN: Focused examination of the left chest and the left buttock was performed.  - Left Upper Chest: 8 mm well defined subcutaneous papule, minimal adjacent erythema and edema  - Left Buttock: pink sclerotic plaque at site of prior surgery, central superficial erosion with serous crust   - No other lesions of concern on areas examined.     Medications:  Current Outpatient Medications   Medication     nabumetone (RELAFEN) 500 MG tablet     acetaminophen 500 MG CAPS     albuterol (PROAIR HFA/PROVENTIL HFA/VENTOLIN HFA) 108 (90 Base) MCG/ACT inhaler     aspirin (ASA) 81 MG tablet     cetirizine (ZYRTEC) 10 MG tablet     cholecalciferol (D3-50) 1250 mcg (38455 units) capsule     clindamycin (CLINDAMAX) 1 % external gel     clonazePAM " "(KLONOPIN) 0.5 MG tablet     cyanocobalamin (CYANOCOBALAMIN) 1000 MCG/ML injection     doxycycline monohydrate (MONODOX) 100 MG capsule     dronabinol (MARINOL) 5 MG capsule     DULoxetine (CYMBALTA) 60 MG capsule     EPINEPHrine (ANY BX GENERIC EQUIV) 0.3 MG/0.3ML injection 2-pack     ergocalciferol (ERGOCALCIFEROL) 1.25 MG (49968 UT) capsule     eszopiclone (LUNESTA) 3 MG tablet     etanercept (ENBREL SURECLICK) 50 MG/ML autoinjector     famotidine (PEPCID) 20 MG tablet     fenofibrate (TRICOR) 48 MG tablet     fluticasone (FLONASE) 50 MCG/ACT nasal spray     fluticasone-salmeterol (ADVAIR) 250-50 MCG/DOSE inhaler     levothyroxine (SYNTHROID/LEVOTHROID) 75 MCG tablet     lidocaine (XYLOCAINE) 5 % external ointment     lidocaine, viscous, (XYLOCAINE) 2 % solution     melatonin 3 MG tablet     methocarbamol (ROBAXIN) 500 MG tablet     metoclopramide (REGLAN) 5 MG tablet     metoprolol succinate ER (TOPROL-XL) 200 MG 24 hr tablet     montelukast (SINGULAIR) 10 MG tablet     naloxone (NARCAN) 4 MG/0.1ML nasal spray     olopatadine (PATADAY) 0.2 % ophthalmic solution     omega-3 acid ethyl esters (LOVAZA) 1 g capsule     omeprazole 20 MG tablet     ondansetron (ZOFRAN-ODT) 8 MG ODT tab     order for DME     order for DME     oxyCODONE (ROXICODONE) 5 MG tablet     pregabalin (LYRICA) 150 MG capsule     pyridostigmine (MESTINON) 60 MG tablet     ramipril (ALTACE) 10 MG capsule     risperiDONE (RISPERDAL) 0.5 MG tablet     rizatriptan (MAXALT-MLT) 5 MG ODT     rosuvastatin (CRESTOR) 40 MG tablet     syringe, disposable, (BD TUBERCULIN SYRINGE) 1 ML MISC     syringe/needle, disp, (BD INTEGRA SYRINGE) 25G X 1\" 3 ML MISC     valACYclovir (VALTREX) 500 MG tablet     vitamin B complex with vitamin C (STRESS TAB) tablet     ZINC SULFATE-VITAMIN C MT     No current facility-administered medications for this visit.      Past Medical History:   Patient Active Problem List   Diagnosis     Intermittent asthma     Hyperlipidemia LDL " goal <100     Chronic nonallergic rhinitis     Diverticulosis     GERD (gastroesophageal reflux disease)     Generalized anxiety disorder     LLOYD (obstructive sleep apnea)- mild (AHI 11)     Intracranial arachnoid cyst     Facet arthritis of cervical region     Acquired hypothyroidism     Bipolar 2 disorder (H)     Chronic midline low back pain without sciatica     Irritable bowel syndrome with diarrhea     B12 deficiency     Essential hypertension with goal blood pressure less than 140/90     Chronic, continuous use of opioids     Ankylosing spondylitis of sacral region (H)     Morbid obesity due to hypertriglyceridemia (H)     Fatty infiltration of liver     DDD (degenerative disc disease), lumbar     Type 2 diabetes mellitus with complication, without long-term current use of insulin (H)     Peripheral polyneuropathy     History of pulmonary embolism     Ingrown toenail     Hypertriglyceridemia     Gastroparesis     Orthostatic dizziness     POTS (postural orthostatic tachycardia syndrome)     PHN (postherpetic neuralgia)     Dysautonomia (H)     Chronic pain syndrome     Borderline personality disorder (H)     MDD (major depressive disorder), recurrent severe, without psychosis (H)     Folliculitis     Immunosuppression (H)     Past Medical History:   Diagnosis Date     Acne      Acquired hypothyroidism      Allergic state      Ankylosing spondylitis lumbar region (H)      Ankylosing spondylitis of sacral region (H)      Anxiety      Bipolar 2 disorder (H)      Chest pain     Chest pain, regulated w/BP meds. Clear arteries.     Chronic pain      DDD (degenerative disc disease), lumbar      Depressive disorder      Diabetes (H)      Diverticulosis      Facet arthritis of cervical region      Gastroesophageal reflux disease      Hypertension      IBS (irritable bowel syndrome)      Intracranial arachnoid cyst      Major depressive disorder, recurrent episode (H)     Multiple psych providers - they manage meds  August 2015: Provigil induced severe mood dis-function      Major depressive disorder, recurrent episode, severe (H) 12/2/2020     MDD (major depressive disorder), recurrent severe, without psychosis (H) 7/21/2021     LLOYD (obstructive sleep apnea)- mild (AHI 11)      Polyneuropathy      Pulmonary embolism (H)      Skin exam, screening for cancer 12/3/2013     Sleep apnea      Uncomplicated asthma         CC No referring provider defined for this encounter. on close of this encounter.

## 2022-05-27 ENCOUNTER — TELEPHONE (OUTPATIENT)
Dept: FAMILY MEDICINE | Facility: CLINIC | Age: 47
End: 2022-05-27
Payer: MEDICARE

## 2022-05-27 NOTE — TELEPHONE ENCOUNTER
Please initiate a PA.  Log in to: go[employer]/login  Key: [id number]    lidocaine (XYLOCAINE) 5 % external ointment

## 2022-05-31 DIAGNOSIS — J45.30 MILD PERSISTENT ASTHMA: ICD-10-CM

## 2022-05-31 RX ORDER — MONTELUKAST SODIUM 10 MG/1
TABLET ORAL
Qty: 90 TABLET | Refills: 3 | Status: SHIPPED | OUTPATIENT
Start: 2022-05-31 | End: 2023-05-30

## 2022-05-31 NOTE — TELEPHONE ENCOUNTER
Medication:     montelukast (SINGULAIR) 10 MG tablet 90 tablet 3 5/19/2021  No   Sig - Route: Take 1 tablet (10 mg) by mouth every evening - Oral     Date last written: 05/19/2021  Dispensed amount: 90 tabs  Refills: 3      Pt's last office visit: 06/09/2021  Next scheduled office visit: 06/08/2022      Per the RN/LPN medication refill protocol, writer is able to refill this request.     Slime Szymanski LPN  Pulmonary Medicine:  [facility]  Phone: [phone] Fax: [phone]

## 2022-06-01 NOTE — TELEPHONE ENCOUNTER
PA Initiation    Medication: Lidocaine 5% ointment  Insurance Company: [employer] - Phone [phone] Fax [phone]  Pharmacy Filling the Rx: [facility] [id number] [facility] [address] [address]  Filling Pharmacy Phone: [phone]  Filling Pharmacy Fax: [phone]  Start Date: 6/1/2022

## 2022-06-01 NOTE — TELEPHONE ENCOUNTER
Prior Authorization Approval    Authorization Effective Date: 1/1/2022  Authorization Expiration Date: 12/31/2022  Medication: Lidocaine 5% ointment  Approved Dose/Quantity:   Reference #: [id number]   Insurance Company: [employer] - Phone [phone] Fax [phone]  Which Pharmacy is filling the prescription (Not needed for infusion/clinic administered): [facility] [id number] - [place], [place] - [address] [address]  Pharmacy Notified: Yes  Patient Notified: Yes

## 2022-06-08 ENCOUNTER — OFFICE VISIT (OUTPATIENT)
Dept: PULMONOLOGY | Facility: CLINIC | Age: 47
End: 2022-06-08
Payer: MEDICARE

## 2022-06-08 VITALS
OXYGEN SATURATION: 95 % | BODY MASS INDEX: 36.45 KG/M2 | WEIGHT: 315 LBS | RESPIRATION RATE: 16 BRPM | DIASTOLIC BLOOD PRESSURE: 78 MMHG | SYSTOLIC BLOOD PRESSURE: 120 MMHG | HEART RATE: 68 BPM | HEIGHT: 78 IN

## 2022-06-08 DIAGNOSIS — J45.30 MILD PERSISTENT ASTHMA, UNSPECIFIED WHETHER COMPLICATED: ICD-10-CM

## 2022-06-08 DIAGNOSIS — E66.01 MORBID OBESITY (H): Primary | ICD-10-CM

## 2022-06-08 PROCEDURE — 99215 OFFICE O/P EST HI 40 MIN: CPT | Performed by: INTERNAL MEDICINE

## 2022-06-08 RX ORDER — ALBUTEROL SULFATE 90 UG/1
2 AEROSOL, METERED RESPIRATORY (INHALATION) EVERY 4 HOURS PRN
Qty: 8.5 G | Refills: 11 | Status: SHIPPED | OUTPATIENT
Start: 2022-06-08 | End: 2023-06-08

## 2022-06-08 RX ORDER — FLUTICASONE PROPIONATE AND SALMETEROL 250; 50 UG/1; UG/1
1 POWDER RESPIRATORY (INHALATION) EVERY 12 HOURS
Qty: 3 EACH | Refills: 3 | Status: SHIPPED | OUTPATIENT
Start: 2022-06-08 | End: 2022-07-10

## 2022-06-08 ASSESSMENT — PAIN SCALES - GENERAL: PAINLEVEL: NO PAIN (0)

## 2022-06-08 ASSESSMENT — ASTHMA QUESTIONNAIRES
QUESTION_5 LAST FOUR WEEKS HOW WOULD YOU RATE YOUR ASTHMA CONTROL: WELL CONTROLLED
ACUTE_EXACERBATION_TODAY: NO
ACT_TOTALSCORE: 21
ACT_TOTALSCORE: 21
QUESTION_4 LAST FOUR WEEKS HOW OFTEN HAVE YOU USED YOUR RESCUE INHALER OR NEBULIZER MEDICATION (SUCH AS ALBUTEROL): ONCE A WEEK OR LESS
QUESTION_3 LAST FOUR WEEKS HOW OFTEN DID YOUR ASTHMA SYMPTOMS (WHEEZING, COUGHING, SHORTNESS OF BREATH, CHEST TIGHTNESS OR PAIN) WAKE YOU UP AT NIGHT OR EARLIER THAN USUAL IN THE MORNING: NOT AT ALL
QUESTION_2 LAST FOUR WEEKS HOW OFTEN HAVE YOU HAD SHORTNESS OF BREATH: ONCE OR TWICE A WEEK
QUESTION_1 LAST FOUR WEEKS HOW MUCH OF THE TIME DID YOUR ASTHMA KEEP YOU FROM GETTING AS MUCH DONE AT WORK, SCHOOL OR AT HOME: A LITTLE OF THE TIME

## 2022-06-08 NOTE — PROGRESS NOTES
"Joel Pineda's goals for this visit include: Return  He requests these members of his care team be copied on today's visit information: PCP    PCP: Ksenia Lyles    Referring Provider:  No referring provider defined for this encounter.    /78   Pulse 68   Resp 16   Ht 1.981 m (6' 6\")   Wt 144.6 kg (318 lb 12.8 oz)   SpO2 95%   BMI 36.84 kg/m      Do you need any medication refills at today's visit? TINA Szymanski LPN  Pulmonary Medicine:  [facility]  Phone: [phone] Fax: [phone]      "

## 2022-06-12 ENCOUNTER — MYC REFILL (OUTPATIENT)
Dept: FAMILY MEDICINE | Facility: CLINIC | Age: 47
End: 2022-06-12
Payer: MEDICARE

## 2022-06-12 DIAGNOSIS — M51.369 DDD (DEGENERATIVE DISC DISEASE), LUMBAR: ICD-10-CM

## 2022-06-12 DIAGNOSIS — M45.8 ANKYLOSING SPONDYLITIS OF SACRAL REGION (H): ICD-10-CM

## 2022-06-14 RX ORDER — OXYCODONE HYDROCHLORIDE 5 MG/1
5 TABLET ORAL EVERY 6 HOURS PRN
Qty: 35 TABLET | Refills: 0 | Status: SHIPPED | OUTPATIENT
Start: 2022-06-14 | End: 2022-07-06

## 2022-06-15 ENCOUNTER — MYC MEDICAL ADVICE (OUTPATIENT)
Dept: PALLIATIVE MEDICINE | Facility: CLINIC | Age: 47
End: 2022-06-15
Payer: MEDICARE

## 2022-06-15 NOTE — TELEPHONE ENCOUNTER
Per patient myChart message:  From: Tito Pineda      Created: 6/15/2022 1:12 PM      Dr. Montgomery and Team,     Good afternoon. I'm wondering if next week's procedure can be expanded so that it's bilateral,  to include a right thigh nerve block, due to more intense pain.     Thanks,  Tito.           6/20/22:  repeat Left LFCN     Routed to provider.     Eloisa RN-BSN  [facility]

## 2022-06-19 DIAGNOSIS — M51.369 DDD (DEGENERATIVE DISC DISEASE), LUMBAR: ICD-10-CM

## 2022-06-19 DIAGNOSIS — M45.8 ANKYLOSING SPONDYLITIS OF SACRAL REGION (H): ICD-10-CM

## 2022-06-20 ENCOUNTER — OFFICE VISIT (OUTPATIENT)
Dept: PALLIATIVE MEDICINE | Facility: CLINIC | Age: 47
End: 2022-06-20
Payer: MEDICARE

## 2022-06-20 VITALS — DIASTOLIC BLOOD PRESSURE: 76 MMHG | SYSTOLIC BLOOD PRESSURE: 118 MMHG | HEART RATE: 74 BPM

## 2022-06-20 DIAGNOSIS — G57.10 MERALGIA PARESTHETICA, UNSPECIFIED LATERALITY: Primary | ICD-10-CM

## 2022-06-20 PROCEDURE — 64450 NJX AA&/STRD OTHER PN/BRANCH: CPT | Mod: 50 | Performed by: PAIN MEDICINE

## 2022-06-20 PROCEDURE — 76942 ECHO GUIDE FOR BIOPSY: CPT | Performed by: PAIN MEDICINE

## 2022-06-20 RX ORDER — BUPIVACAINE HYDROCHLORIDE 5 MG/ML
10 INJECTION, SOLUTION EPIDURAL; INTRACAUDAL ONCE
Status: COMPLETED | OUTPATIENT
Start: 2022-06-20 | End: 2022-06-20

## 2022-06-20 RX ORDER — TRIAMCINOLONE ACETONIDE 40 MG/ML
40 INJECTION, SUSPENSION INTRA-ARTICULAR; INTRAMUSCULAR ONCE
Status: COMPLETED | OUTPATIENT
Start: 2022-06-20 | End: 2022-06-20

## 2022-06-20 RX ADMIN — BUPIVACAINE HYDROCHLORIDE 50 MG: 5 INJECTION, SOLUTION EPIDURAL; INTRACAUDAL at 22:04

## 2022-06-20 RX ADMIN — TRIAMCINOLONE ACETONIDE 40 MG: 40 INJECTION, SUSPENSION INTRA-ARTICULAR; INTRAMUSCULAR at 22:04

## 2022-06-20 ASSESSMENT — PAIN SCALES - GENERAL: PAINLEVEL: MODERATE PAIN (4)

## 2022-06-20 NOTE — PATIENT INSTRUCTIONS
[facility]  Post Procedure Instructions    Today you had:  trigger point injections   occipital nerve block   bursa injection  Joint Injection    Medications used:  lidocaine   bupivicaine   kenolog   dexamethasone        Go to the emergency room if you develop any shortness of breath  Monitor the injection sites for signs and symptoms of infection-fever, chills, redness, swelling, warmth, or drainage to areas.  You may have soreness at injection sites for up to 24 hours.  It may take up to 14 days for the steroid medication to start working although you may feel the effect as early as a few days after the procedure.     You may apply ice to the painful areas to help minimize the discomfort of the needle pokes.  Do not apply heat to sites for at least 12 hours.  You may use anti-inflammatory medications or Tylenol for pain control if necessary    Pain Clinic phone number during work hours (Monday through Friday 8 am-4:30 pm) at [phone] or the Provider Line after hours at [phone]:

## 2022-06-21 ENCOUNTER — MYC MEDICAL ADVICE (OUTPATIENT)
Dept: DERMATOLOGY | Facility: CLINIC | Age: 47
End: 2022-06-21

## 2022-06-21 RX ORDER — NABUMETONE 500 MG/1
TABLET, FILM COATED ORAL
Qty: 120 TABLET | Refills: 0 | Status: SHIPPED | OUTPATIENT
Start: 2022-06-21 | End: 2022-07-06

## 2022-06-21 NOTE — PROGRESS NOTES
Diagnoses and all orders for this visit:       Bilateral Meralgia paresthetica         Diagnoses post: Bilateral meralgia paresthetica  Current Procedure: Bilateral Lateral femoral cutaneous nerve block  Current Indication (include preoperative):  Alleviation of pain      REASON FOR REFERRAL: Anterior lateral thigh pain and burning  Sonographic guidance will be used to ensure accurate placement.  PATIENT EDUCATION:  Ready to learn with no apparent learning barriers identified.  Learning preferences include listening. Explained diagnosis and treatment plan as well as treatment alternatives. Patient expressed understanding of the content.  Following denial of allergy and review of potential side effects and complications including but not necessarily limited to infection, bleeding, allergic reaction, post-injection flare, local tissue breakdown, injury to soft tissue and/or nerves and seizure, patient indicated their understanding and agreed to proceed. A written consent was obtained and is scanned into the chart. Written and signed consent obtained and is scanned into the chart.  PROCEDURE:  Prior to the procedure,a 12 MHz linear transducer was used to visualize the abdominal/groin musculature to determine the approach for the procedure.  Procedure was carried out using sterile technique including Chloraprep scrub, a sterile transducer cover, and sterile transducer gel. A simple surgical tray was used.  PROCEDURAL PAUSE:  Procedural pause conducted to verify correct patient identity, procedure to be performed, and as applicable, correct side/site, correct patient position, availability of implants, special equipment, or special requirements.  Patient position:  Supine  Transducer type:  12 MHz linear array transducer  Approach:  Medial to lateral parallel to long axis of transducer  Local Anesthesia:  25 gauge 2.5 inch needle was used to anesthetize the skin, subcutaneous tissue  with 5 ml of 1% Lidocaine  Injection:  After confirming needle tip position, syringe was replaced with one containing 20 mg/ml Kenalog and 1 ml of 0.5% Bupivacaine which was injected and seen hydodissecting the sartorius and tensor fascia florida on each side.  Needle was removed bandage placed over the wound.  AFTERCARE:  Patient tolerated the procedure without complication. After a short observation period, the patient was discharged under their own power and in excellent condition.  Pain noted to be a 8/10 before completion of the procedure and 0/10 after completion of the procedure.      Injection solution contained:  2ml of 0.5% bupivacaine, and 40mg of Kenalog.              Bubba Montgomery MD  [facility]

## 2022-06-23 DIAGNOSIS — E53.8 B12 DEFICIENCY: ICD-10-CM

## 2022-06-23 RX ORDER — NEEDLES, FILTER 19GX1 1/2"
NEEDLE, DISPOSABLE MISCELLANEOUS
Qty: 12 EACH | Refills: 0 | Status: SHIPPED | OUTPATIENT
Start: 2022-06-23 | End: 2023-05-30

## 2022-06-23 NOTE — TELEPHONE ENCOUNTER
"Requested Prescriptions   Pending Prescriptions Disp Refills     syringe/needle (disp) (BD INTEGRA SYRINGE) 25G X 1\" 3 ML MISC [Pharmacy Med Name: BD Integra Syringe Miscellaneous 25G X 1\" 3 ML] 12 each 0     Sig: USE WITH B12 INJECTIONS       There is no refill protocol information for this order        Gayle ALEJANDRON, RN    "

## 2022-06-24 NOTE — PATIENT INSTRUCTIONS
At [facility], we strive to deliver an exceptional experience to you, every time we see you.  If you receive a survey in the mail, please send us back your thoughts. We really do value your feedback.    Your care team:                            Family Medicine Internal Medicine   MD Houston Frances MD Shantel Branch-Fleming, MD Katya Georgiev PA-C Megan Hill, APRN CNP    Javier Cavazos, MD Pediatrics   Klever Obando, SHERWIN Scherer, MD Radha Camarena APRN CNP   MD Humaira Devlin MD Deborah Mielke, MD Diana Kauffman, APRN [facility]      Clinic hours: Monday - Thursday 7 am-7 pm; Fridays 7 am-5 pm.   Urgent care: Monday - Friday 11 am-9 pm; Saturday and Sunday 9 am-5 pm.  Pharmacy : Monday -Thursday 8 am-8 pm; Friday 8 am-6 pm; Saturday and Sunday 9 am-5 pm.     Clinic: [phone]   Pharmacy: [phone]        Patient Education     Paronychia of the Finger or Toe  Paronychia is an infection near a fingernail or toenail. It usually occurs when an opening in the cuticle or an ingrown toenail lets bacteria under the skin.  The infection will need to be drained if pus is present. If the infection has been caught early, you may need only antibiotic treatment. Healing will take about 1 to 2 weeks.  Home care  Follow these guidelines when caring for yourself at home:    Clean and soak the toe or finger. Do this 2 times a day for the first 3 days. To do so:  ? Soak your foot or hand in a tub of warm water for 5 minutes. Or hold your toe or finger under a faucet of warm running water for 5 minutes.  ? Clean any crust away with soap and water using a cotton swab.  ? Put antibiotic ointment on the infected area.    Change the dressing daily or any time it gets dirty.    If you were given antibiotics, take them as directed until they are all gone.    If your infection is on a toe, wear comfortable shoes with a lot of toe room. You can also wear  open-toed sandals while your toe heals.    You may use over-the-counter medicine (acetaminophen or ibuprofen to help with pain, unless another medicine was prescribed. If you have chronic liver or kidney disease, talk with your healthcare provider before using these medicines. Also talk with your provider if you've had a stomach ulcer or GI (gastrointestinal) bleeding.  Prevention  The following can prevent paronychia:    Avoid cutting or playing with your cuticles at home.    Don't bite your nails.    Don't suck on your thumbs or fingers.  Follow-up care  Follow up with your healthcare provider, or as advised.  When to seek medical advice  Call your healthcare provider right away if any of these occur:    Redness, pain, or swelling of the finger or toe gets worse    Red streaks in the skin leading away from the wound    Pus or fluid draining from the nail area    Fever of 100.4 F (38 C) or higher, or as directed by your provider  Date Last Reviewed: 8/1/2016 2000-2018 [employer]. [address] [place] [address], [place] [address]. All rights reserved. This information is not intended as a substitute for professional medical care. Always follow your healthcare professional's instructions.            Z Plasty Text: The lesion was extirpated to the level of the fat with a #15 scalpel blade.  Given the location of the defect, shape of the defect and the proximity to free margins a Z-plasty was deemed most appropriate for repair.  Using a sterile surgical marker, the appropriate transposition arms of the Z-plasty were drawn incorporating the defect and placing the expected incisions within the relaxed skin tension lines where possible.    The area thus outlined was incised deep to adipose tissue with a #15 scalpel blade.  The skin margins were undermined to an appropriate distance in all directions utilizing iris scissors.  The opposing transposition arms were then transposed into place in opposite direction and anchored with interrupted buried subcutaneous sutures.

## 2022-06-27 ENCOUNTER — OFFICE VISIT (OUTPATIENT)
Dept: DERMATOLOGY | Facility: CLINIC | Age: 47
End: 2022-06-27
Payer: MEDICARE

## 2022-06-27 DIAGNOSIS — R52 SCAR PAINFUL: ICD-10-CM

## 2022-06-27 DIAGNOSIS — R21 RASH: Primary | ICD-10-CM

## 2022-06-27 DIAGNOSIS — M51.369 DDD (DEGENERATIVE DISC DISEASE), LUMBAR: ICD-10-CM

## 2022-06-27 DIAGNOSIS — L90.5 SCAR PAINFUL: ICD-10-CM

## 2022-06-27 PROCEDURE — 87070 CULTURE OTHR SPECIMN AEROBIC: CPT | Performed by: DERMATOLOGY

## 2022-06-27 PROCEDURE — 87529 HSV DNA AMP PROBE: CPT | Performed by: DERMATOLOGY

## 2022-06-27 PROCEDURE — 87101 SKIN FUNGI CULTURE: CPT | Performed by: DERMATOLOGY

## 2022-06-27 PROCEDURE — 99024 POSTOP FOLLOW-UP VISIT: CPT | Performed by: DERMATOLOGY

## 2022-06-27 PROCEDURE — 87798 DETECT AGENT NOS DNA AMP: CPT | Performed by: DERMATOLOGY

## 2022-06-27 RX ORDER — METHOCARBAMOL 500 MG/1
TABLET, FILM COATED ORAL
Qty: 240 TABLET | Refills: 0 | Status: SHIPPED | OUTPATIENT
Start: 2022-06-27 | End: 2022-08-11

## 2022-06-27 ASSESSMENT — PAIN SCALES - GENERAL: PAINLEVEL: MODERATE PAIN (4)

## 2022-06-27 NOTE — LETTER
6/27/2022         RE: Joel Pineda  [address] [place] [address]        Dear Colleague,    Thank you for referring your patient, Joel Pineda, to the [facility]. Please see a copy of my visit note below.    [facility] Dermatology Note  Encounter Date: Jun 27, 2022  Office Visit     Dermatology Problem List:  1. Immunosuppresion for ankylosing spondylitis, on Enbrel.  2. Acne vulgaris s/p Accutane in 1990's  3. Folliculitis  - BPO wash, Hibiclens, clobetasol for flares  4. HS - active lesion on the left buttock, excision 4/7/22  - doxy 100mg BID x 3 months (initiated 2/22/22); BPO wash/Hibiclens; topical clindamycin   5. Repigmented nevus - lower back - Will obtained record - Recheck with Dr. Tilley   6. Cyst - left chest pending excision with Dr. Tilley     ____________________________________________     Assessment & Plan:     # Hidradenitis suppurativa - left buttock s/p excision 4/7/22.   - Continue Doxycycline 100 mg BID for now.  - Continue BPO wash BID   - Continue bleach bath/ vinegar soak.   - ILK injections today for ongoing pain and firm scar.      # Rash on the right hip s/p injections. HARRIS was negative today. Counseled on anti-fungal pills and how terbinafine is a different class of antifungals than what he is allergic to and may be a viable option, however patient deferred medication and wants to wait for culture results.   - Bacterial and viral cultures and swabs were performed today.   - Start anti-fungal pills pending cultures. Counseled on how terbinafine may be a viable option as it is a different class of antifungals than what he is allergic to.     Procedures Performed:   - Intra-lesional triamcinolone procedure note. After verbal consent and review of risk of pain and skin thinning, positioning and cleansing with isopropyl alcohol, 0.4 total mL of triamcinolone 10 mg/mL was injected into 1 lesion(s) on the left buttocks. The  patient tolerated the procedure well and left the dermatology clinic in good condition.    Follow-up: 2 weeks virtual    Staff and Scribe:     Scribe Disclosure:   I, Rodri Magallon, am serving as a scribe to document services personally performed by this physician, Dr. Drake Tilley, based on data collection and the provider's statements to me.     Provider Disclosure:   The documentation recorded by the scribe accurately reflects the services I personally performed and the decisions made by me.  I personally performed the procedures today.    Drake Tilley DO    Department of Dermatology  [facility]: Phone: [phone], Fax:[phone]  [facility]: Phone: [phone], Fax: [phone]    ____________________________________________    CC: Follow Up (HS) and Rash (Right hip post injections one week ago at pain clinic- washing with hibiclens, applying topical lidocaine, cortisone and antibiotic)    HPI:  Mr. Joel Pineda is a(n) 46 year old male who presents today as a return patient for HS.     Last seen on 5/25/22 for HS. At that time, patient to continue doxycycline, bleach bath, and BPO wash.     Today, patient notes rash occurred after injections on the right hip at a pain clinic. Says it presents similarly to his prior yeast infections. States rash is associated with burning and pain, but denies itching, bleeding, or discharge. Notes improvement with topical lidocaine. Currently using hydrocortisone, mupirocin, and bleach bath.     Notes he is allergic to hydrocortisone, ketoconazole, and nystatin, and had difficulty treating a prior case of oral thrush due to his allergies. States he gets hives from using antifungals.     Patient is otherwise feeling well, without additional skin concerns.    Labs Reviewed:  N/A    Physical Exam:  Vitals: There were no vitals taken for this  visit.  SKIN: Focused examination of buttocks and hips was performed.  - Left Buttock: pink sclerotic plaque at site of prior surgery, central superficial erosion with serous crust   - Right hit: 2 pink sclerotic papules at site of prior injection site.   - Right inguinal fold: 3 cm red eroded plaque along inguinal fold without scale, crust, or drainage.    - No other lesions of concern on areas examined.     Medications:  Current Outpatient Medications   Medication     acetaminophen 500 MG CAPS     albuterol (PROAIR HFA/PROVENTIL HFA/VENTOLIN HFA) 108 (90 Base) MCG/ACT inhaler     aspirin (ASA) 81 MG tablet     cetirizine (ZYRTEC) 10 MG tablet     cholecalciferol (D3-50) 1250 mcg (79741 units) capsule     clindamycin (CLINDAMAX) 1 % external gel     clonazePAM (KLONOPIN) 0.5 MG tablet     cyanocobalamin (CYANOCOBALAMIN) 1000 MCG/ML injection     dronabinol (MARINOL) 5 MG capsule     DULoxetine (CYMBALTA) 60 MG capsule     EPINEPHrine (ANY BX GENERIC EQUIV) 0.3 MG/0.3ML injection 2-pack     ergocalciferol (ERGOCALCIFEROL) 1.25 MG (64684 UT) capsule     eszopiclone (LUNESTA) 3 MG tablet     etanercept (ENBREL SURECLICK) 50 MG/ML autoinjector     famotidine (PEPCID) 20 MG tablet     fenofibrate (TRICOR) 48 MG tablet     fluticasone (FLONASE) 50 MCG/ACT nasal spray     fluticasone-salmeterol (ADVAIR) 250-50 MCG/ACT inhaler     levothyroxine (SYNTHROID/LEVOTHROID) 75 MCG tablet     lidocaine (XYLOCAINE) 5 % external ointment     lidocaine, viscous, (XYLOCAINE) 2 % solution     melatonin 3 MG tablet     methocarbamol (ROBAXIN) 500 MG tablet     metoclopramide (REGLAN) 5 MG tablet     metoprolol succinate ER (TOPROL-XL) 200 MG 24 hr tablet     montelukast (SINGULAIR) 10 MG tablet     nabumetone (RELAFEN) 500 MG tablet     naloxone (NARCAN) 4 MG/0.1ML nasal spray     olopatadine (PATADAY) 0.2 % ophthalmic solution     omega-3 acid ethyl esters (LOVAZA) 1 g capsule     omeprazole 20 MG tablet     ondansetron (ZOFRAN-ODT) 8  "MG ODT tab     order for DME     order for DME     oxyCODONE (ROXICODONE) 5 MG tablet     pregabalin (LYRICA) 150 MG capsule     pyridostigmine (MESTINON) 60 MG tablet     ramipril (ALTACE) 10 MG capsule     risperiDONE (RISPERDAL) 0.5 MG tablet     rizatriptan (MAXALT-MLT) 5 MG ODT     rosuvastatin (CRESTOR) 40 MG tablet     syringe/needle, disp, (BD INTEGRA SYRINGE) 25G X 1\" 3 ML MISC     valACYclovir (VALTREX) 500 MG tablet     vitamin B complex with vitamin C (STRESS TAB) tablet     ZINC SULFATE-VITAMIN C MT     fluticasone-salmeterol (ADVAIR) 250-50 MCG/DOSE inhaler     syringe, disposable, (BD TUBERCULIN SYRINGE) 1 ML MISC     No current facility-administered medications for this visit.      Past Medical History:   Patient Active Problem List   Diagnosis     Intermittent asthma     Hyperlipidemia LDL goal <100     Chronic nonallergic rhinitis     Diverticulosis     GERD (gastroesophageal reflux disease)     Generalized anxiety disorder     LLOYD (obstructive sleep apnea)- mild (AHI 11)     Intracranial arachnoid cyst     Facet arthritis of cervical region     Acquired hypothyroidism     Bipolar 2 disorder (H)     Chronic midline low back pain without sciatica     Irritable bowel syndrome with diarrhea     B12 deficiency     Essential hypertension with goal blood pressure less than 140/90     Chronic, continuous use of opioids     Ankylosing spondylitis of sacral region (H)     Morbid obesity due to hypertriglyceridemia (H)     Fatty infiltration of liver     DDD (degenerative disc disease), lumbar     Type 2 diabetes mellitus with complication, without long-term current use of insulin (H)     Peripheral polyneuropathy     History of pulmonary embolism     Ingrown toenail     Hypertriglyceridemia     Gastroparesis     Orthostatic dizziness     POTS (postural orthostatic tachycardia syndrome)     PHN (postherpetic neuralgia)     Dysautonomia (H)     Chronic pain syndrome     Borderline personality disorder (H)     " MDD (major depressive disorder), recurrent severe, without psychosis (H)     Folliculitis     Immunosuppression (H)     Past Medical History:   Diagnosis Date     Acne      Acquired hypothyroidism      Allergic state      Ankylosing spondylitis lumbar region (H)      Ankylosing spondylitis of sacral region (H)      Anxiety      Bipolar 2 disorder (H)      Chest pain     Chest pain, regulated w/BP meds. Clear arteries.     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        Sincerely,        Drake Tilley MD

## 2022-06-27 NOTE — PROGRESS NOTES
[facility] Dermatology Note  Encounter Date: Jun 27, 2022  Office Visit     Dermatology Problem List:  1. Immunosuppresion for ankylosing spondylitis, on Enbrel.  2. Acne vulgaris s/p Accutane in 1990's  3. Folliculitis  - BPO wash, Hibiclens, clobetasol for flares  4. HS - active lesion on the left buttock, excision 4/7/22  - doxy 100mg BID x 3 months (initiated 2/22/22); BPO wash/Hibiclens; topical clindamycin   5. Repigmented nevus - lower back - Will obtained record - Recheck with Dr. Tilley   6. Cyst - left chest pending excision with Dr. Tilley     ____________________________________________     Assessment & Plan:     # Hidradenitis suppurativa - left buttock s/p excision 4/7/22.   - Continue Doxycycline 100 mg BID for now.  - Continue BPO wash BID   - Continue bleach bath/ vinegar soak.   - ILK injections today for ongoing pain and firm scar.      # Rash on the right hip s/p injections. HARRIS was negative today. Counseled on anti-fungal pills and how terbinafine is a different class of antifungals than what he is allergic to and may be a viable option, however patient deferred medication and wants to wait for culture results.   - Bacterial and viral cultures and swabs were performed today.   - Start anti-fungal pills pending cultures. Counseled on how terbinafine may be a viable option as it is a different class of antifungals than what he is allergic to.     Procedures Performed:   - Intra-lesional triamcinolone procedure note. After verbal consent and review of risk of pain and skin thinning, positioning and cleansing with isopropyl alcohol, 0.4 total mL of triamcinolone 10 mg/mL was injected into 1 lesion(s) on the left buttocks. The patient tolerated the procedure well and left the dermatology clinic in good condition.    Follow-up: 2 weeks virtual    Staff and Scribe:     Scribe Disclosure:   Rodri DEVRIES, am serving as a scribe to document services personally performed by this  physician, Dr. Drake Tilley, based on data collection and the provider's statements to me.     Provider Disclosure:   The documentation recorded by the scribe accurately reflects the services I personally performed and the decisions made by me.  I personally performed the procedures today.    Drake Tilley DO    Department of Dermatology  [facility]: Phone: [phone], Fax:[phone]  [facility]: Phone: [phone], Fax: [phone]    ____________________________________________    CC: Follow Up (HS) and Rash (Right hip post injections one week ago at pain clinic- washing with hibiclens, applying topical lidocaine, cortisone and antibiotic)    HPI:  Mr. Joel Pineda is a(n) 46 year old male who presents today as a return patient for HS.     Last seen on 5/25/22 for HS. At that time, patient to continue doxycycline, bleach bath, and BPO wash.     Today, patient notes rash occurred after injections on the right hip at a pain clinic. Says it presents similarly to his prior yeast infections. States rash is associated with burning and pain, but denies itching, bleeding, or discharge. Notes improvement with topical lidocaine. Currently using hydrocortisone, mupirocin, and bleach bath.     Notes he is allergic to hydrocortisone, ketoconazole, and nystatin, and had difficulty treating a prior case of oral thrush due to his allergies. States he gets hives from using antifungals.     Patient is otherwise feeling well, without additional skin concerns.    Labs Reviewed:  N/A    Physical Exam:  Vitals: There were no vitals taken for this visit.  SKIN: Focused examination of buttocks and hips was performed.  - Left Buttock: pink sclerotic plaque at site of prior surgery, central superficial erosion with serous crust   - Right hit: 2 pink sclerotic papules at site of prior injection site.   - Right  inguinal fold: 3 cm red eroded plaque along inguinal fold without scale, crust, or drainage.    - No other lesions of concern on areas examined.     Medications:  Current Outpatient Medications   Medication     acetaminophen 500 MG CAPS     albuterol (PROAIR HFA/PROVENTIL HFA/VENTOLIN HFA) 108 (90 Base) MCG/ACT inhaler     aspirin (ASA) 81 MG tablet     cetirizine (ZYRTEC) 10 MG tablet     cholecalciferol (D3-50) 1250 mcg (09299 units) capsule     clindamycin (CLINDAMAX) 1 % external gel     clonazePAM (KLONOPIN) 0.5 MG tablet     cyanocobalamin (CYANOCOBALAMIN) 1000 MCG/ML injection     dronabinol (MARINOL) 5 MG capsule     DULoxetine (CYMBALTA) 60 MG capsule     EPINEPHrine (ANY BX GENERIC EQUIV) 0.3 MG/0.3ML injection 2-pack     ergocalciferol (ERGOCALCIFEROL) 1.25 MG (54769 UT) capsule     eszopiclone (LUNESTA) 3 MG tablet     etanercept (ENBREL SURECLICK) 50 MG/ML autoinjector     famotidine (PEPCID) 20 MG tablet     fenofibrate (TRICOR) 48 MG tablet     fluticasone (FLONASE) 50 MCG/ACT nasal spray     fluticasone-salmeterol (ADVAIR) 250-50 MCG/ACT inhaler     levothyroxine (SYNTHROID/LEVOTHROID) 75 MCG tablet     lidocaine (XYLOCAINE) 5 % external ointment     lidocaine, viscous, (XYLOCAINE) 2 % solution     melatonin 3 MG tablet     methocarbamol (ROBAXIN) 500 MG tablet     metoclopramide (REGLAN) 5 MG tablet     metoprolol succinate ER (TOPROL-XL) 200 MG 24 hr tablet     montelukast (SINGULAIR) 10 MG tablet     nabumetone (RELAFEN) 500 MG tablet     naloxone (NARCAN) 4 MG/0.1ML nasal spray     olopatadine (PATADAY) 0.2 % ophthalmic solution     omega-3 acid ethyl esters (LOVAZA) 1 g capsule     omeprazole 20 MG tablet     ondansetron (ZOFRAN-ODT) 8 MG ODT tab     order for DME     order for DME     oxyCODONE (ROXICODONE) 5 MG tablet     pregabalin (LYRICA) 150 MG capsule     pyridostigmine (MESTINON) 60 MG tablet     ramipril (ALTACE) 10 MG capsule     risperiDONE (RISPERDAL) 0.5 MG tablet     rizatriptan  "(MAXALT-MLT) 5 MG ODT     rosuvastatin (CRESTOR) 40 MG tablet     syringe/needle, disp, (BD INTEGRA SYRINGE) 25G X 1\" 3 ML MISC     valACYclovir (VALTREX) 500 MG tablet     vitamin B complex with vitamin C (STRESS TAB) tablet     ZINC SULFATE-VITAMIN C MT     fluticasone-salmeterol (ADVAIR) 250-50 MCG/DOSE inhaler     syringe, disposable, (BD TUBERCULIN SYRINGE) 1 ML MISC     No current facility-administered medications for this visit.      Past Medical History:   Patient Active Problem List   Diagnosis     Intermittent asthma     Hyperlipidemia LDL goal <100     Chronic nonallergic rhinitis     Diverticulosis     GERD (gastroesophageal reflux disease)     Generalized anxiety disorder     LLOYD (obstructive sleep apnea)- mild (AHI 11)     Intracranial arachnoid cyst     Facet arthritis of cervical region     Acquired hypothyroidism     Bipolar 2 disorder (H)     Chronic midline low back pain without sciatica     Irritable bowel syndrome with diarrhea     B12 deficiency     Essential hypertension with goal blood pressure less than 140/90     Chronic, continuous use of opioids     Ankylosing spondylitis of sacral region (H)     Morbid obesity due to hypertriglyceridemia (H)     Fatty infiltration of liver     DDD (degenerative disc disease), lumbar     Type 2 diabetes mellitus with complication, without long-term current use of insulin (H)     Peripheral polyneuropathy     History of pulmonary embolism     Ingrown toenail     Hypertriglyceridemia     Gastroparesis     Orthostatic dizziness     POTS (postural orthostatic tachycardia syndrome)     PHN (postherpetic neuralgia)     Dysautonomia (H)     Chronic pain syndrome     Borderline personality disorder (H)     MDD (major depressive disorder), recurrent severe, without psychosis (H)     Folliculitis     Immunosuppression (H)     Past Medical History:   Diagnosis Date     Acne      Acquired hypothyroidism      Allergic state      Ankylosing spondylitis lumbar region " (H)      Ankylosing spondylitis of sacral region (H)      Anxiety      Bipolar 2 disorder (H)      Chest pain     Chest pain, regulated w/BP meds. Clear arteries.     Chronic pain      DDD (degenerative disc disease), lumbar      Depressive disorder      Diabetes (H)      Diverticulosis      Facet arthritis of cervical region      Gastroesophageal reflux disease      Hypertension      IBS (irritable bowel syndrome)      Intracranial arachnoid cyst      Major depressive disorder, recurrent episode (H)     Multiple psych providers - they manage meds August 2015: Provigil induced severe mood dis-function      Major depressive disorder, recurrent episode, severe (H) 12/2/2020     MDD (major depressive disorder), recurrent severe, without psychosis (H) 7/21/2021     LLOYD (obstructive sleep apnea)- mild (AHI 11)      Polyneuropathy      Pulmonary embolism (H)      Skin exam, screening for cancer 12/3/2013     Sleep apnea      Uncomplicated asthma

## 2022-06-27 NOTE — PATIENT INSTRUCTIONS
Intralesional Kenalog (ILK) Injections    Intralesional steroid injection involves a corticosteroid, such as triamcinolone acetonide (brand name Kenalog), which is injected directly into a lesion on or immediately below the skin. Intralesional kenalog may be used to treat the following skin conditions:    Alopecia areata  Discoid lupus erythematosus  Keloids/hypertrophic scars  Granuloma annulare and other granulomatous disorders  Hypertrophic lichen planus  Lichen simplex chronicus (neurodermatitis)  Localised psoriasis  Necrobiosis lipoidica  Acne cysts (nodulocystic acne)  Small infantile hemangiomas    Possible side-effects of intralesional Kenalog (ILK) injections include bleeding, pain, infection, contact dermatitis, nerve damage, scar formation and need for a repeat procedure.    Some people may experience delayed side-effects including:  Lipoatrophy, appearing as skin indentations or dimples around the injection sites a few weeks after treatment; these may be permanent.  White marks (leukoderma) or brown marks (postinflammatory pigmentation) at the site of injection or spreading from the site of injection - these may resolve or persist long term.  Telangiectasia, or small dilated blood vessels at the site of injection.   Increased hair growth at the site of injection - this resolves eventually.  Steroid acne: steroids increase growth hormone, leading to increased sebum (oil) production by the sebaceous glands. Steroid acne generally improves once the steroid has been stopped.      Who should I call with questions?  [facility]: [phone]   [facility]: [phone]  For urgent needs outside of business hours call the [facility] at [phone] and ask for the dermatology resident on call

## 2022-06-29 LAB
BACTERIA SKIN AEROBE CULT: NO GROWTH
VZV DNA SPEC QL NAA+PROBE: NEGATIVE

## 2022-06-30 ENCOUNTER — VIRTUAL VISIT (OUTPATIENT)
Dept: PALLIATIVE MEDICINE | Facility: CLINIC | Age: 47
End: 2022-06-30
Payer: MEDICARE

## 2022-06-30 DIAGNOSIS — M45.8 ANKYLOSING SPONDYLITIS OF SACRAL REGION (H): ICD-10-CM

## 2022-06-30 DIAGNOSIS — M51.369 DDD (DEGENERATIVE DISC DISEASE), LUMBAR: ICD-10-CM

## 2022-06-30 DIAGNOSIS — M54.16 LUMBAR RADICULOPATHY: ICD-10-CM

## 2022-06-30 DIAGNOSIS — R10.9 ABDOMINAL CRAMPING: ICD-10-CM

## 2022-06-30 DIAGNOSIS — G89.29 CHRONIC INTRACTABLE PAIN: Primary | ICD-10-CM

## 2022-06-30 PROCEDURE — 99214 OFFICE O/P EST MOD 30 MIN: CPT | Mod: 95 | Performed by: NURSE PRACTITIONER

## 2022-06-30 RX ORDER — BACLOFEN 10 MG/1
5-10 TABLET ORAL 3 TIMES DAILY PRN
Qty: 60 TABLET | Refills: 1 | Status: SHIPPED | OUTPATIENT
Start: 2022-06-30 | End: 2022-07-19 | Stop reason: SINTOL

## 2022-06-30 ASSESSMENT — PAIN SCALES - GENERAL: PAINLEVEL: MODERATE PAIN (4)

## 2022-07-02 ENCOUNTER — MYC MEDICAL ADVICE (OUTPATIENT)
Dept: PULMONOLOGY | Facility: CLINIC | Age: 47
End: 2022-07-02

## 2022-07-02 DIAGNOSIS — J45.30 MILD PERSISTENT ASTHMA, UNSPECIFIED WHETHER COMPLICATED: Primary | ICD-10-CM

## 2022-07-02 LAB
HSV1 DNA SPEC QL NAA+PROBE: NOT DETECTED
HSV2 DNA SPEC QL NAA+PROBE: NOT DETECTED

## 2022-07-06 ENCOUNTER — MYC REFILL (OUTPATIENT)
Dept: FAMILY MEDICINE | Facility: CLINIC | Age: 47
End: 2022-07-06

## 2022-07-06 DIAGNOSIS — M51.369 DDD (DEGENERATIVE DISC DISEASE), LUMBAR: ICD-10-CM

## 2022-07-06 DIAGNOSIS — M45.8 ANKYLOSING SPONDYLITIS OF SACRAL REGION (H): ICD-10-CM

## 2022-07-06 RX ORDER — OXYCODONE HYDROCHLORIDE 5 MG/1
5 TABLET ORAL EVERY 6 HOURS PRN
Qty: 35 TABLET | Refills: 0 | Status: SHIPPED | OUTPATIENT
Start: 2022-07-06 | End: 2022-07-07

## 2022-07-06 RX ORDER — FLUTICASONE PROPIONATE AND SALMETEROL 500; 50 UG/1; UG/1
1 POWDER RESPIRATORY (INHALATION) EVERY 12 HOURS
Qty: 60 EACH | Refills: 11 | Status: SHIPPED | OUTPATIENT
Start: 2022-07-06 | End: 2022-11-11

## 2022-07-07 ENCOUNTER — MYC MEDICAL ADVICE (OUTPATIENT)
Dept: DERMATOLOGY | Facility: CLINIC | Age: 47
End: 2022-07-07

## 2022-07-07 RX ORDER — OXYCODONE HYDROCHLORIDE 5 MG/1
5 TABLET ORAL EVERY 6 HOURS PRN
Qty: 35 TABLET | Refills: 0 | Status: SHIPPED | OUTPATIENT
Start: 2022-07-07 | End: 2022-07-28

## 2022-07-07 ASSESSMENT — SLEEP AND FATIGUE QUESTIONNAIRES
HOW LIKELY ARE YOU TO NOD OFF OR FALL ASLEEP WHILE LYING DOWN TO REST IN THE AFTERNOON WHEN CIRCUMSTANCES PERMIT: HIGH CHANCE OF DOZING
HOW LIKELY ARE YOU TO NOD OFF OR FALL ASLEEP WHILE SITTING AND READING: SLIGHT CHANCE OF DOZING
HOW LIKELY ARE YOU TO NOD OFF OR FALL ASLEEP WHEN YOU ARE A PASSENGER IN A CAR FOR AN HOUR WITHOUT A BREAK: MODERATE CHANCE OF DOZING
HOW LIKELY ARE YOU TO NOD OFF OR FALL ASLEEP WHILE WATCHING TV: SLIGHT CHANCE OF DOZING
HOW LIKELY ARE YOU TO NOD OFF OR FALL ASLEEP WHILE SITTING AND TALKING TO SOMEONE: WOULD NEVER DOZE
HOW LIKELY ARE YOU TO NOD OFF OR FALL ASLEEP WHILE SITTING QUIETLY AFTER LUNCH WITHOUT ALCOHOL: SLIGHT CHANCE OF DOZING
HOW LIKELY ARE YOU TO NOD OFF OR FALL ASLEEP IN A CAR, WHILE STOPPED FOR A FEW MINUTES IN TRAFFIC: WOULD NEVER DOZE
HOW LIKELY ARE YOU TO NOD OFF OR FALL ASLEEP WHILE SITTING INACTIVE IN A PUBLIC PLACE: WOULD NEVER DOZE

## 2022-07-09 ASSESSMENT — ANXIETY QUESTIONNAIRES
GAD7 TOTAL SCORE: 10
1. FEELING NERVOUS, ANXIOUS, OR ON EDGE: MORE THAN HALF THE DAYS
2. NOT BEING ABLE TO STOP OR CONTROL WORRYING: MORE THAN HALF THE DAYS
7. FEELING AFRAID AS IF SOMETHING AWFUL MIGHT HAPPEN: SEVERAL DAYS
GAD7 TOTAL SCORE: 10
4. TROUBLE RELAXING: SEVERAL DAYS
7. FEELING AFRAID AS IF SOMETHING AWFUL MIGHT HAPPEN: SEVERAL DAYS
3. WORRYING TOO MUCH ABOUT DIFFERENT THINGS: MORE THAN HALF THE DAYS
5. BEING SO RESTLESS THAT IT IS HARD TO SIT STILL: NOT AT ALL
GAD7 TOTAL SCORE: 10
8. IF YOU CHECKED OFF ANY PROBLEMS, HOW DIFFICULT HAVE THESE MADE IT FOR YOU TO DO YOUR WORK, TAKE CARE OF THINGS AT HOME, OR GET ALONG WITH OTHER PEOPLE?: VERY DIFFICULT
6. BECOMING EASILY ANNOYED OR IRRITABLE: MORE THAN HALF THE DAYS

## 2022-07-09 ASSESSMENT — PATIENT HEALTH QUESTIONNAIRE - PHQ9
10. IF YOU CHECKED OFF ANY PROBLEMS, HOW DIFFICULT HAVE THESE PROBLEMS MADE IT FOR YOU TO DO YOUR WORK, TAKE CARE OF THINGS AT HOME, OR GET ALONG WITH OTHER PEOPLE: VERY DIFFICULT
SUM OF ALL RESPONSES TO PHQ QUESTIONS 1-9: 9
SUM OF ALL RESPONSES TO PHQ QUESTIONS 1-9: 9

## 2022-07-11 ENCOUNTER — OFFICE VISIT (OUTPATIENT)
Dept: SLEEP MEDICINE | Facility: CLINIC | Age: 47
End: 2022-07-11
Payer: MEDICARE

## 2022-07-11 ENCOUNTER — VIRTUAL VISIT (OUTPATIENT)
Dept: DERMATOLOGY | Facility: CLINIC | Age: 47
End: 2022-07-11

## 2022-07-11 VITALS
HEIGHT: 78 IN | HEART RATE: 84 BPM | BODY MASS INDEX: 36.45 KG/M2 | OXYGEN SATURATION: 96 % | DIASTOLIC BLOOD PRESSURE: 87 MMHG | SYSTOLIC BLOOD PRESSURE: 130 MMHG | WEIGHT: 315 LBS

## 2022-07-11 DIAGNOSIS — G47.52 RBD (REM BEHAVIORAL DISORDER): ICD-10-CM

## 2022-07-11 DIAGNOSIS — R21 RASH: Primary | ICD-10-CM

## 2022-07-11 DIAGNOSIS — L73.2 HIDRADENITIS SUPPURATIVA: ICD-10-CM

## 2022-07-11 DIAGNOSIS — G47.33 OSA (OBSTRUCTIVE SLEEP APNEA): Primary | ICD-10-CM

## 2022-07-11 PROCEDURE — 99441 PR PHYSICIAN TELEPHONE EVALUATION 5-10 MIN: CPT | Mod: 95 | Performed by: DERMATOLOGY

## 2022-07-11 PROCEDURE — 99213 OFFICE O/P EST LOW 20 MIN: CPT | Performed by: PSYCHIATRY & NEUROLOGY

## 2022-07-11 ASSESSMENT — PAIN SCALES - GENERAL: PAINLEVEL: MILD PAIN (2)

## 2022-07-11 NOTE — LETTER
7/11/2022         RE: Joel Pineda  [address] [place] [address]        Dear Colleague,    Thank you for referring your patient, Joel Pineda, to the [facility]. Please see a copy of my visit note below.    [facility] Dermatology Note  Encounter Date: Jul 11, 2022  Store-and-Forward and Telephone ([phone]). Location of teledermatologist: [facility].  Start time: 1030. End time: 1038.    Dermatology Problem List:  1. Immunosuppresion for ankylosing spondylitis, on Enbrel.  2. Acne vulgaris s/p Accutane in 1990's  3. Folliculitis  - BPO wash, Hibiclens, clobetasol for flares  4. HS - active lesion on the left buttock, excision 4/7/22  - doxy 100mg BID x 3 months (initiated 2/22/22); BPO wash/Hibiclens; topical clindamycin  5. Repigmented nevus - lower back - Will obtained record - Recheck with Dr. Tilley  6. Cyst - left chest pending excision with Dr. Tilley     ____________________________________________    Assessment & Plan:     # right groin rash.     Bacterial culture negative.   HSV and VZV PCR negative.   Fungal culture pending.   He notes some symptomatic improvement with topical lamisil, but it is slow to resolve.    Continue topical Lamisil while awaiting fungal culture results.   If not improving in 2 weeks RTC for reevaluation and possible biopsy.       #Hidradenitis suppurativa status post left medial buttocks excision  Scar with improvement after intralesional triamcinolone injection.  It is softer and less painful.       Procedures Performed:    None    Staff:     Provider Disclosure:   Drake Tilley DO    Department of Dermatology  [facility]: Phone: [phone], Fax:[phone]  [facility]: Phone: [phone], Fax:  [phone]    ____________________________________________    CC: Wound Check (Right hip has improved in pain but wound still present )    HPI:  Mr. Joel Pineda is a(n) 47 year old male who presents today for follow-up  for right groin rash.   The patient notes symptomatic improvement particularly pain after starting Lamisil topical.  His perception is there is improvement, but it is slow.    Patient is otherwise feeling well, without additional skin concerns.    Labs Reviewed:  6/27/2022 labs reviewed  HSV PCR negative  VZV PCR negative  Bacterial culture no growth  Fungal culture preliminary negative    Physical Exam:  Vitals: There were no vitals taken for this visit.  SKIN: Teledermatology photos were reviewed; image quality and interpretability: acceptable. Image date: 7/10/22.  - Linear grouping of superficial erosions and moderate adjacent erythema, right groin fold  - No other lesions of concern on areas examined.     Medications:  Current Outpatient Medications   Medication     acetaminophen 500 MG CAPS     albuterol (PROAIR HFA/PROVENTIL HFA/VENTOLIN HFA) 108 (90 Base) MCG/ACT inhaler     aspirin (ASA) 81 MG tablet     baclofen (LIORESAL) 10 MG tablet     cetirizine (ZYRTEC) 10 MG tablet     cholecalciferol (D3-50) 1250 mcg (59932 units) capsule     clindamycin (CLINDAMAX) 1 % external gel     clonazePAM (KLONOPIN) 0.5 MG tablet     cyanocobalamin (CYANOCOBALAMIN) 1000 MCG/ML injection     dronabinol (MARINOL) 5 MG capsule     DULoxetine (CYMBALTA) 60 MG capsule     EPINEPHrine (ANY BX GENERIC EQUIV) 0.3 MG/0.3ML injection 2-pack     ergocalciferol (ERGOCALCIFEROL) 1.25 MG (68266 UT) capsule     eszopiclone (LUNESTA) 3 MG tablet     etanercept (ENBREL SURECLICK) 50 MG/ML autoinjector     famotidine (PEPCID) 20 MG tablet     fenofibrate (TRICOR) 48 MG tablet     fluticasone (FLONASE) 50 MCG/ACT nasal spray     fluticasone-salmeterol (ADVAIR) 500-50 MCG/ACT inhaler     levothyroxine  "(SYNTHROID/LEVOTHROID) 75 MCG tablet     lidocaine (XYLOCAINE) 5 % external ointment     melatonin 3 MG tablet     methocarbamol (ROBAXIN) 500 MG tablet     metoclopramide (REGLAN) 5 MG tablet     metoprolol succinate ER (TOPROL-XL) 200 MG 24 hr tablet     montelukast (SINGULAIR) 10 MG tablet     nabumetone (RELAFEN) 500 MG tablet     naloxone (NARCAN) 4 MG/0.1ML nasal spray     olopatadine (PATADAY) 0.2 % ophthalmic solution     omega-3 acid ethyl esters (LOVAZA) 1 g capsule     omeprazole 20 MG tablet     ondansetron (ZOFRAN-ODT) 8 MG ODT tab     order for DME     order for DME     oxyCODONE (ROXICODONE) 5 MG tablet     pregabalin (LYRICA) 150 MG capsule     pyridostigmine (MESTINON) 60 MG tablet     ramipril (ALTACE) 10 MG capsule     risperiDONE (RISPERDAL) 0.5 MG tablet     rizatriptan (MAXALT-MLT) 5 MG ODT     rosuvastatin (CRESTOR) 40 MG tablet     syringe/needle, disp, (BD INTEGRA SYRINGE) 25G X 1\" 3 ML MISC     terbinafine (LAMISIL) 1 % external cream     valACYclovir (VALTREX) 500 MG tablet     vitamin B complex with vitamin C (STRESS TAB) tablet     ZINC SULFATE-VITAMIN C MT     Current Facility-Administered Medications   Medication     triamcinolone acetonide (KENALOG-10) injection 4 mg      Past Medical/Surgical History:   Patient Active Problem List   Diagnosis     Intermittent asthma     Hyperlipidemia LDL goal <100     Chronic nonallergic rhinitis     Diverticulosis     GERD (gastroesophageal reflux disease)     Generalized anxiety disorder     LLOYD (obstructive sleep apnea)- mild (AHI 11)     Intracranial arachnoid cyst     Facet arthritis of cervical region     Acquired hypothyroidism     Bipolar 2 disorder (H)     Chronic midline low back pain without sciatica     Irritable bowel syndrome with diarrhea     B12 deficiency     Essential hypertension with goal blood pressure less than 140/90     Chronic, continuous use of opioids     Ankylosing spondylitis of sacral region (H)     Morbid obesity due " to hypertriglyceridemia (H)     Fatty infiltration of liver     DDD (degenerative disc disease), lumbar     Type 2 diabetes mellitus with complication, without long-term current use of insulin (H)     Peripheral polyneuropathy     History of pulmonary embolism     Ingrown toenail     Hypertriglyceridemia     Gastroparesis     Orthostatic dizziness     POTS (postural orthostatic tachycardia syndrome)     PHN (postherpetic neuralgia)     Dysautonomia (H)     Chronic pain syndrome     Borderline personality disorder (H)     MDD (major depressive disorder), recurrent severe, without psychosis (H)     Folliculitis     Immunosuppression (H)     Past Medical History:   Diagnosis Date     Acne      Acquired hypothyroidism      Allergic state      Ankylosing spondylitis lumbar region (H)      Ankylosing spondylitis of sacral region (H)      Anxiety      Bipolar 2 disorder (H)      Chest pain     Chest pain, regulated w/BP meds. Clear arteries.     Chronic pain      DDD (degenerative disc disease), lumbar      Depressive disorder      Diabetes (H)      Diverticulosis      Facet arthritis of cervical region      Gastroesophageal reflux disease      Hypertension      IBS (irritable bowel syndrome)      Intracranial arachnoid cyst      Major depressive disorder, recurrent episode (H)     Multiple psych providers - they manage meds August 2015: Provigil induced severe mood dis-function      Major depressive disorder, recurrent episode, severe (H) 12/2/2020     MDD (major depressive disorder), recurrent severe, without psychosis (H) 7/21/2021     LLOYD (obstructive sleep apnea)- mild (AHI 11)      Polyneuropathy      Pulmonary embolism (H)      Skin exam, screening for cancer 12/3/2013     Sleep apnea      Uncomplicated asthma            Again, thank you for allowing me to participate in the care of your patient.        Sincerely,        Drake Tilley MD

## 2022-07-11 NOTE — NURSING NOTE
Teledermatology Nurse Call Patients:     Are you in the [facility] at the time of the encounter? yes    Today's visit will be billed to you and your insurance.    A teledermatology visit is not as thorough as an in-person visit and the quality of the photograph sent may not be of the same quality as that taken by the dermatology clinic.      Megha Blankenship LPN

## 2022-07-11 NOTE — PROGRESS NOTES
[facility] Dermatology Note  Encounter Date: Jul 11, 2022  Store-and-Forward and Telephone ([phone]). Location of teledermatologist: [facility].  Start time: 1030. End time: 1038.    Dermatology Problem List:  1. Immunosuppresion for ankylosing spondylitis, on Enbrel.  2. Acne vulgaris s/p Accutane in 1990's  3. Folliculitis  - BPO wash, Hibiclens, clobetasol for flares  4. HS - active lesion on the left buttock, excision 4/7/22  - doxy 100mg BID x 3 months (initiated 2/22/22); BPO wash/Hibiclens; topical clindamycin  5. Repigmented nevus - lower back - Will obtained record - Recheck with Dr. Tilley  6. Cyst - left chest pending excision with Dr. Tilley     ____________________________________________    Assessment & Plan:     # right groin rash.     Bacterial culture negative.   HSV and VZV PCR negative.   Fungal culture pending.   He notes some symptomatic improvement with topical lamisil, but it is slow to resolve.    Continue topical Lamisil while awaiting fungal culture results.   If not improving in 2 weeks RTC for reevaluation and possible biopsy.       #Hidradenitis suppurativa status post left medial buttocks excision  Scar with improvement after intralesional triamcinolone injection.  It is softer and less painful.       Procedures Performed:    None    Staff:     Provider Disclosure:   Drake Tilley DO    Department of Dermatology  [facility]: Phone: [phone], Fax:[phone]  [facility]: Phone: [phone], Fax: [phone]    ____________________________________________    CC: Wound Check (Right hip has improved in pain but wound still present )    HPI:  Mr. Joel Pineda is a(n) 47 year old male who presents today for follow-up  for right groin rash.   The patient notes symptomatic improvement particularly pain after  starting Lamisil topical.  His perception is there is improvement, but it is slow.    Patient is otherwise feeling well, without additional skin concerns.    Labs Reviewed:  6/27/2022 labs reviewed  HSV PCR negative  VZV PCR negative  Bacterial culture no growth  Fungal culture preliminary negative    Physical Exam:  Vitals: There were no vitals taken for this visit.  SKIN: Teledermatology photos were reviewed; image quality and interpretability: acceptable. Image date: 7/10/22.  - Linear grouping of superficial erosions and moderate adjacent erythema, right groin fold  - No other lesions of concern on areas examined.     Medications:  Current Outpatient Medications   Medication     acetaminophen 500 MG CAPS     albuterol (PROAIR HFA/PROVENTIL HFA/VENTOLIN HFA) 108 (90 Base) MCG/ACT inhaler     aspirin (ASA) 81 MG tablet     baclofen (LIORESAL) 10 MG tablet     cetirizine (ZYRTEC) 10 MG tablet     cholecalciferol (D3-50) 1250 mcg (64522 units) capsule     clindamycin (CLINDAMAX) 1 % external gel     clonazePAM (KLONOPIN) 0.5 MG tablet     cyanocobalamin (CYANOCOBALAMIN) 1000 MCG/ML injection     dronabinol (MARINOL) 5 MG capsule     DULoxetine (CYMBALTA) 60 MG capsule     EPINEPHrine (ANY BX GENERIC EQUIV) 0.3 MG/0.3ML injection 2-pack     ergocalciferol (ERGOCALCIFEROL) 1.25 MG (93371 UT) capsule     eszopiclone (LUNESTA) 3 MG tablet     etanercept (ENBREL SURECLICK) 50 MG/ML autoinjector     famotidine (PEPCID) 20 MG tablet     fenofibrate (TRICOR) 48 MG tablet     fluticasone (FLONASE) 50 MCG/ACT nasal spray     fluticasone-salmeterol (ADVAIR) 500-50 MCG/ACT inhaler     levothyroxine (SYNTHROID/LEVOTHROID) 75 MCG tablet     lidocaine (XYLOCAINE) 5 % external ointment     melatonin 3 MG tablet     methocarbamol (ROBAXIN) 500 MG tablet     metoclopramide (REGLAN) 5 MG tablet     metoprolol succinate ER (TOPROL-XL) 200 MG 24 hr tablet     montelukast (SINGULAIR) 10 MG tablet     nabumetone (RELAFEN) 500 MG tablet  "    naloxone (NARCAN) 4 MG/0.1ML nasal spray     olopatadine (PATADAY) 0.2 % ophthalmic solution     omega-3 acid ethyl esters (LOVAZA) 1 g capsule     omeprazole 20 MG tablet     ondansetron (ZOFRAN-ODT) 8 MG ODT tab     order for DME     order for DME     oxyCODONE (ROXICODONE) 5 MG tablet     pregabalin (LYRICA) 150 MG capsule     pyridostigmine (MESTINON) 60 MG tablet     ramipril (ALTACE) 10 MG capsule     risperiDONE (RISPERDAL) 0.5 MG tablet     rizatriptan (MAXALT-MLT) 5 MG ODT     rosuvastatin (CRESTOR) 40 MG tablet     syringe/needle, disp, (BD INTEGRA SYRINGE) 25G X 1\" 3 ML MISC     terbinafine (LAMISIL) 1 % external cream     valACYclovir (VALTREX) 500 MG tablet     vitamin B complex with vitamin C (STRESS TAB) tablet     ZINC SULFATE-VITAMIN C MT     Current Facility-Administered Medications   Medication     triamcinolone acetonide (KENALOG-10) injection 4 mg      Past Medical/Surgical History:   Patient Active Problem List   Diagnosis     Intermittent asthma     Hyperlipidemia LDL goal <100     Chronic nonallergic rhinitis     Diverticulosis     GERD (gastroesophageal reflux disease)     Generalized anxiety disorder     LLOYD (obstructive sleep apnea)- mild (AHI 11)     Intracranial arachnoid cyst     Facet arthritis of cervical region     Acquired hypothyroidism     Bipolar 2 disorder (H)     Chronic midline low back pain without sciatica     Irritable bowel syndrome with diarrhea     B12 deficiency     Essential hypertension with goal blood pressure less than 140/90     Chronic, continuous use of opioids     Ankylosing spondylitis of sacral region (H)     Morbid obesity due to hypertriglyceridemia (H)     Fatty infiltration of liver     DDD (degenerative disc disease), lumbar     Type 2 diabetes mellitus with complication, without long-term current use of insulin (H)     Peripheral polyneuropathy     History of pulmonary embolism     Ingrown toenail     Hypertriglyceridemia     Gastroparesis     " Orthostatic dizziness     POTS (postural orthostatic tachycardia syndrome)     PHN (postherpetic neuralgia)     Dysautonomia (H)     Chronic pain syndrome     Borderline personality disorder (H)     MDD (major depressive disorder), recurrent severe, without psychosis (H)     Folliculitis     Immunosuppression (H)     Past Medical History:   Diagnosis Date     Acne      Acquired hypothyroidism      Allergic state      Ankylosing spondylitis lumbar region (H)      Ankylosing spondylitis of sacral region (H)      Anxiety      Bipolar 2 disorder (H)      Chest pain     Chest pain, regulated w/BP meds. Clear arteries.     Chronic pain      DDD (degenerative disc disease), lumbar      Depressive disorder      Diabetes (H)      Diverticulosis      Facet arthritis of cervical region      Gastroesophageal reflux disease      Hypertension      IBS (irritable bowel syndrome)      Intracranial arachnoid cyst      Major depressive disorder, recurrent episode (H)     Multiple psych providers - they manage meds August 2015: Provigil induced severe mood dis-function      Major depressive disorder, recurrent episode, severe (H) 12/2/2020     MDD (major depressive disorder), recurrent severe, without psychosis (H) 7/21/2021     LLOYD (obstructive sleep apnea)- mild (AHI 11)      Polyneuropathy      Pulmonary embolism (H)      Skin exam, screening for cancer 12/3/2013     Sleep apnea      Uncomplicated asthma

## 2022-07-11 NOTE — PATIENT INSTRUCTIONS
[facility] Dermatology Visit    Thank you for allowing us to participate in your care. Your findings, instructions and follow-up plan are as follows:         When should I call my doctor?  If you are worsening or not improving, please, contact us or seek urgent care as noted below.     Who should I call with questions (adults)?  [facility] (adult and pediatric): [phone]  [facility] (adult): [phone]  For urgent needs outside of business hours call the [facility] at [phone] and ask for the dermatology resident on call  If this is a medical emergency and you are unable to reach an ER, Call 911    Who should I call with questions (pediatric)?  [facility]- Pediatric Dermatology  Dr. Christel Lizarraga, Dr. Caroline Grant, Dr. Carli Rivas, Denita Stoll, PA  Dr. Emily Campoverde, Dr. Gracie Alonso & Dr. Evan Madrid  Non Urgent  Nurse Triage Line; [phone]- Amanda and Lakshmi RN Care Coordinators   [place] (/Complex ) [phone]    If you need a prescription refill, please contact your pharmacy. Refills are approved or denied by our physicians during normal business hours, Monday through Fridays  Per office policy, refills will not be granted if you have not been seen within the past year (or sooner depending on your child's condition).    Scheduling Information:  Pediatric Appointment Scheduling and Call Center [phone]  Radiology Scheduling- [phone]  Sedation Unit Scheduling- [phone]  [place] Scheduling- General [phone]; Pediatric Dermatology [phone]  Main  Services: [phone]  Azerbaijani: [phone]  Beninese: [phone]  Hmong/[place]achew/[place] citizen: [phone]  Preadmission Nursing Department Fax Number: [phone] (fax all pre-operative paperwork to this number)    For urgent matters arising during evenings, weekends, or  holidays that cannot wait for normal business hours please call [phone] and ask for the dermatology resident on call to be paged.

## 2022-07-11 NOTE — NURSING NOTE
"Chief Complaint   Patient presents with     CPAP Follow Up       Initial /87   Pulse 84   Ht 1.981 m (6' 6\")   Wt 143.8 kg (317 lb)   SpO2 96%   BMI 36.63 kg/m   Estimated body mass index is 36.63 kg/m  as calculated from the following:    Height as of this encounter: 1.981 m (6' 6\").    Weight as of this encounter: 143.8 kg (317 lb).    Medication Reconciliation: complete   ESS:8    Danae Barnes CNA    "

## 2022-07-12 ENCOUNTER — VIRTUAL VISIT (OUTPATIENT)
Dept: FAMILY MEDICINE | Facility: CLINIC | Age: 47
End: 2022-07-12
Payer: MEDICARE

## 2022-07-12 DIAGNOSIS — F31.81 BIPOLAR 2 DISORDER (H): ICD-10-CM

## 2022-07-12 DIAGNOSIS — R10.2 PELVIC PAIN IN MALE: Primary | ICD-10-CM

## 2022-07-12 DIAGNOSIS — N50.811 PAIN IN RIGHT TESTICLE: ICD-10-CM

## 2022-07-12 PROCEDURE — 99214 OFFICE O/P EST MOD 30 MIN: CPT | Mod: 95 | Performed by: NURSE PRACTITIONER

## 2022-07-12 NOTE — PROGRESS NOTES
"Tito is a 47 year old who is being evaluated via a billable video visit.      How would you like to obtain your AVS? MyChart  If the video visit is dropped, the invitation should be resent by: Text to cell phone: [phone]  Will anyone else be joining your video visit? No        Assessment & Plan     Pelvic pain in male  Was in the ER, CT noted bladder thickening, possible cystitis but UA was normal. Will repeat UA, get US of testicle as having pain more right sided see below.  Follow up with Urology if further concerns  - UA with Microscopic reflex to Culture - lab collect; Future  - Adult Urology Referral; Future    Pain in right testicle  Rule out torsion or inflammation  - US Testicular & Scrotum w Doppler Ltd; Future    Bipolar 2 disorder (H)  On-going SI but denies a plan. Always has underlying suicidal ideation but denies wanting to act on it. Mood feels is stable      BMI:   Estimated body mass index is 36.63 kg/m  as calculated from the following:    Height as of 7/11/22: 1.981 m (6' 6\").    Weight as of 7/11/22: 143.8 kg (317 lb).     Depression Screening Follow Up    PHQ 7/9/2022   PHQ-9 Total Score 9   Q9: Thoughts of better off dead/self-harm past 2 weeks Several days   F/U: Thoughts of suicide or self-harm Yes   F/U: Self harm-plan No   F/U: Self-harm action No   F/U: Safety concerns No   Some encounter information is confidential and restricted. Go to Review Flowsheets activity to see all data.           Follow Up     Follow Up Actions Taken  Crisis resource information provided in the After Visit Summary  Referred patient back to mental health provider    Discussed the following ways the patient can remain in a safe environment:  remove alcohol, remove drugs and be around others  See Patient Instructions    No follow-ups on file.    JOCELYN Pro, NP-C  [facility]    Radha Francis is a 47 year old accompanied by his self, presenting for the following health " issues:  ER F/U      HPI     ED/UC Followup:    Facility:  [facility]  Date of visit: 7/8/22  Reason for visit: Abdominal pain  Current Status: Feeling a little better but still having scrotal pain-intermittant. Feels like it did a few years ago.   Pt had CT done. Below are the results  1. Diverticulosis descending and sigmoid colon, without evidence for diverticulitis. 2. Diffuse bladder wall thickening. This was not present on the study from 06/08/2022 and should be related to incomplete distention. Bladder wall hypertrophy or cystitis possible. 3. No other findings to account for the patient's left lower quadrant pain.     Would like to know if he should follow up with urology based on the results.     Hx of diverticulitis a month ago and was in the ER.  This time also lower right/left abdominal pain. Dx possible cystitis on CT, see above for other results. Diverticulosis.     No fever/chills. Breathing okay. No further blood in stool, urinating okay.   Feeling a little more constipated. Has IBS.     Had surgery for hidrinitis supprativa  Was told to be on the lookout for chrons.    Has an appointment with GI July 26th  Baclofen wasn't super helpful. Only took 1/2 pill if he took too much felt off.     Review of Systems   Constitutional, HEENT, cardiovascular, pulmonary, gi -as above and gu systems are negative, except as otherwise noted.      Objective    Vitals - Patient Reported  Pain Score: Moderate Pain (4)      Vitals:  No vitals were obtained today due to virtual visit.    Physical Exam   GENERAL: Healthy, alert and no distress  EYES: Eyes grossly normal to inspection.  No discharge or erythema, or obvious scleral/conjunctival abnormalities.  RESP: No audible wheeze, cough, or visible cyanosis.  No visible retractions or increased work of breathing.    SKIN: Visible skin clear. No significant rash, abnormal pigmentation or lesions.  NEURO: Cranial nerves grossly intact.  Mentation and speech appropriate  for age.  PSYCH: Mentation appears normal, affect normal/bright, judgement and insight intact, normal speech and appearance well-groomed.    Reviewed past results, unable to see ER results            Video-Visit Details    Video Start Time: 4:32 PM    Type of service:  Video Visit    Video End Time:4:51 PM    Originating Location (pt. Location): Home    Distant Location (provider location):  Home secure location     Platform used for Video Visit: nexTune    .  ..  Answers for HPI/ROS submitted by the patient on 7/9/2022  If you checked off any problems, how difficult have these problems made it for you to do your work, take care of things at home, or get along with other people?: Very difficult  PHQ9 TOTAL SCORE: 9  YUDI 7 TOTAL SCORE: 10

## 2022-07-13 ENCOUNTER — LAB (OUTPATIENT)
Dept: LAB | Facility: CLINIC | Age: 47
End: 2022-07-13
Payer: MEDICARE

## 2022-07-13 DIAGNOSIS — R10.2 PELVIC PAIN IN MALE: ICD-10-CM

## 2022-07-13 LAB
ALBUMIN UR-MCNC: NEGATIVE MG/DL
APPEARANCE UR: CLEAR
BACTERIA #/AREA URNS HPF: NORMAL /HPF
BILIRUB UR QL STRIP: NEGATIVE
COLOR UR AUTO: YELLOW
GLUCOSE UR STRIP-MCNC: NEGATIVE MG/DL
HGB UR QL STRIP: ABNORMAL
KETONES UR STRIP-MCNC: NEGATIVE MG/DL
LEUKOCYTE ESTERASE UR QL STRIP: NEGATIVE
NITRATE UR QL: NEGATIVE
PH UR STRIP: 6.5 [PH] (ref 5–7)
RBC #/AREA URNS AUTO: NORMAL /HPF
SP GR UR STRIP: <=1.005 (ref 1–1.03)
SQUAMOUS #/AREA URNS AUTO: NORMAL /LPF
UROBILINOGEN UR STRIP-ACNC: 0.2 E.U./DL
WBC #/AREA URNS AUTO: NORMAL /HPF

## 2022-07-13 PROCEDURE — 81001 URINALYSIS AUTO W/SCOPE: CPT

## 2022-07-13 ASSESSMENT — COLUMBIA-SUICIDE SEVERITY RATING SCALE - C-SSRS
6. HAVE YOU EVER DONE ANYTHING, STARTED TO DO ANYTHING, OR PREPARED TO DO ANYTHING TO END YOUR LIFE?: NO
1. WITHIN THE PAST MONTH, HAVE YOU WISHED YOU WERE DEAD OR WISHED YOU COULD GO TO SLEEP AND NOT WAKE UP?: NO
2. IN THE PAST MONTH, HAVE YOU ACTUALLY HAD ANY THOUGHTS OF KILLING YOURSELF?: NO

## 2022-07-13 NOTE — RESULT ENCOUNTER NOTE
Hi Ttio,   Your urine sample did not show infection. It had a trace amount of blood in it though. Do you see any blood in your urine?  Thank you,  JOCELYN Pro, NP-C  [facility]

## 2022-07-14 ENCOUNTER — ANCILLARY PROCEDURE (OUTPATIENT)
Dept: ULTRASOUND IMAGING | Facility: CLINIC | Age: 47
End: 2022-07-14
Attending: NURSE PRACTITIONER
Payer: MEDICARE

## 2022-07-14 DIAGNOSIS — N50.811 PAIN IN RIGHT TESTICLE: ICD-10-CM

## 2022-07-14 PROCEDURE — 76870 US EXAM SCROTUM: CPT | Performed by: RADIOLOGY

## 2022-07-14 PROCEDURE — 93976 VASCULAR STUDY: CPT | Performed by: RADIOLOGY

## 2022-07-14 NOTE — RESULT ENCOUNTER NOTE
Hi Tito,   The ultrasound shows normal blood flow, no masses to testicles. There is a left sided varicocele. This is an enlarged spermatic vein, it can affect fertility for men, if that is not a concern for you, then we just monitor for now. If you are interested in having kids, then we would recommend a semen analysis. Otherwise we just monitor this condition. The right testicle is normal.  No signs of infection or inflammation (other than slightly enlarged vein).     Please let me know if you have questions.  Thank you,  JOCELYN Pro, NP-C  [facility]

## 2022-07-17 ENCOUNTER — MYC REFILL (OUTPATIENT)
Dept: FAMILY MEDICINE | Facility: CLINIC | Age: 47
End: 2022-07-17

## 2022-07-17 DIAGNOSIS — E53.8 B12 DEFICIENCY: ICD-10-CM

## 2022-07-18 NOTE — PROGRESS NOTES
Medication Therapy Management (MTM) Encounter    ASSESSMENT:                            Medication Adherence/Access: No issues identified    Mood/Anxiety/Insomnia: Follows closely with psychiatry. May benefit from medication adjustments at next visit. Would benefit from exercising as conditions allow. Would benefit from spending time outdoors. Would benefit from following up with a therapist.    Asthma: Stable.     Allergic Rhinitis: My benefit from a trial of fexofenadine or levocetirizine. Discussed increase in side effects with first generation antihistamines.    Gastroparesis: May benefit from avoiding dairy.    PLAN:                          Could consider a trial of fexofenadine or levocetirizine for allergies.  May benefit from avoiding dairy. Follow up with GI.     Follow-up: 3 months.    SUBJECTIVE/OBJECTIVE:                          Joel Pineda is a 47 year old male called for a follow up visit. He was referred to me from Ksenia Lyles. Follow up from 4/19/2022.     Reason for visit: Medication Review.    Allergies/ADRs: Reviewed in chart  Past Medical History: Reviewed in chart  Tobacco: He reports that he has never smoked. He has never used smokeless tobacco.  Alcohol: none  Other Substance Use: None  Caffeine: 6 cups a day  Activity: None    Medication Adherence/Access: no issues reported  The patient fills medications at [facility]: NO, fills medications at [facility].     Mood/Anxiety/Insomnia: current therapy includes  duloxetine 120mg once daily, riserpidone 0.5mg twice daily (stopped taking in the morning (7/18) because he was feeling overly sedated during the day, too soon to know if this change ), clonazepam 0.5mg twice daily (usually just taking at bedtime), Lunesta 3mg at bedtime,  melatonin 13mg at bedtime. Sleep has been fine-falling asleep ok, sometimes waking up early.     Patient's psychiatrist is Dr. Rodriguez at [facility] in [place].  Did go to a [employer] class and  "used a reformer. Has been more consistent with core exercises at home. Doesn't get out as much because of his social anxiety.   More stressors now so leading to more depression and less regulated mood. Has not been meeting with therapist, \"feels talked out\".     Asthma: Current medications: ICS/LABA- Advair 500-50 1 puff(s) twice daily  Montelukast (Singulair) once daily  Short-Acting Bronchodilator: Albuterol MDI. Patient rinses their mouth after using steroid inhaler. Triggers include: cold air and pollutants.  Patient reports the following symptoms: increased need of albuterol.  Asthma Action Plan on file: NO  ACT Total Scores 6/9/2021 4/19/2022 6/8/2022   ACT TOTAL SCORE - - -   ASTHMA ER VISITS - - -   ASTHMA HOSPITALIZATIONS - - -   ACT TOTAL SCORE (Goal Greater than or Equal to 20) 21 21 21   In the past 12 months, how many times did you visit the emergency room for your asthma without being admitted to the hospital? 0 0 0   In the past 12 months, how many times were you hospitalized overnight because of your asthma? 0 0 0     Allergic Rhinitis: Has been using flonase 2 sprays in each nostril at bedtime prior to CPAP and feels this has helped open his nasal passages and helps with his CPAP. Did notice when he was given diphenhydramine prior to CT scan he could breathe better. Currently taking cetirizine 10mg daily. Has tried Claritin in the past and this was not helpful.    Gastroparesis: Metoclopramide 5mg four times daily as needed. Helps keep stomach \"happy\" but continues to have abdominal pain. Follows with [facility].  Tried baclofen for abdominal pain but this caused some mentation side effects. Has not seen a dietician because it is not covered by insurance. Has been trying to eat a low fiber diet (white bread in place of whole wheat), eating smaller meals throughout the day. Mentioned that when he was on doxycycline and was avoiding dairy his stomach felt better.    Today's Vitals: There were no vitals " taken for this visit.  ----------------    I spent 25 minutes with this patient today. All changes were made via collaborative practice agreement with Ksenia Lyles . A copy of the visit note was provided to the patient's provider(s).    The patient was sent via [employer] a summary of these recommendations.     Radha Mcdonald, Pharm.D, [facility]  Medication Therapy Management Pharmacist    Telemedicine Visit Details  Type of service:  Telephone visit  Start Time: 11:33 AM  End Time: 11:58 AM  Originating Location (patient location): [place]  Distant Location (provider locaton):  [facility]     Medication Therapy Recommendations  Seasonal allergic rhinitis, unspecified trigger    Current Medication: cetirizine (ZYRTEC) 10 MG tablet   Rationale: More effective medication available - Ineffective medication - Effectiveness   Recommendation: Change Medication - fexofenadine 180 MG tablet   Status: Accepted - no CPA Needed

## 2022-07-19 ENCOUNTER — VIRTUAL VISIT (OUTPATIENT)
Dept: PHARMACY | Facility: CLINIC | Age: 47
End: 2022-07-19
Payer: COMMERCIAL

## 2022-07-19 DIAGNOSIS — J30.2 SEASONAL ALLERGIC RHINITIS, UNSPECIFIED TRIGGER: ICD-10-CM

## 2022-07-19 DIAGNOSIS — G47.00 INSOMNIA, UNSPECIFIED TYPE: ICD-10-CM

## 2022-07-19 DIAGNOSIS — F41.8 DEPRESSION WITH ANXIETY: Primary | ICD-10-CM

## 2022-07-19 DIAGNOSIS — K31.84 GASTROPARESIS: ICD-10-CM

## 2022-07-19 PROCEDURE — 99607 MTMS BY PHARM ADDL 15 MIN: CPT | Performed by: PHARMACIST

## 2022-07-19 PROCEDURE — 99606 MTMS BY PHARM EST 15 MIN: CPT | Performed by: PHARMACIST

## 2022-07-19 RX ORDER — SODIUM FLUORIDE 5 MG/ML
PASTE, DENTIFRICE DENTAL AT BEDTIME
COMMUNITY
Start: 2022-05-20

## 2022-07-19 NOTE — PATIENT INSTRUCTIONS
"Recommendations from today's MTM visit:                                                         Could consider a trial of fexofenadine or levocetirizine for allergies.  May benefit from avoiding dairy. Follow up with GI.     Follow-up: 3 months    It was great speaking with you today.  I value your experience and would be very thankful for your time in providing feedback in our clinic survey. In the next few days, you may receive an email or text message from [employer] with a link to a survey related to your  clinical pharmacist.\"     To schedule another MTM appointment, please call the clinic directly or you may call the MTM scheduling line at [phone] or toll-free at [phone].     My Clinical Pharmacist's contact information:                                                      Please feel free to contact me with any questions or concerns you have.      Radha Mcdonald, Pharm.D, [facility]  Medication Therapy Management Pharmacist      "

## 2022-07-21 ENCOUNTER — VIRTUAL VISIT (OUTPATIENT)
Dept: UROLOGY | Facility: CLINIC | Age: 47
End: 2022-07-21
Attending: NURSE PRACTITIONER
Payer: MEDICARE

## 2022-07-21 DIAGNOSIS — R68.82 LOW LIBIDO: Primary | ICD-10-CM

## 2022-07-21 DIAGNOSIS — I86.1 LEFT VARICOCELE: ICD-10-CM

## 2022-07-21 DIAGNOSIS — R10.2 PELVIC PAIN IN MALE: ICD-10-CM

## 2022-07-21 PROCEDURE — 99204 OFFICE O/P NEW MOD 45 MIN: CPT | Mod: 95 | Performed by: UROLOGY

## 2022-07-21 NOTE — PROGRESS NOTES
Follow up for RBD and LLOYD     LLOYD: Continues to do well on CPAP.  Uses every night for approximately 9-10 hours, AHI is 0.5 and has minimal leak.  He feels more refreshed using it and indicates he will continue to do so. Order placed for new CPAP supplies.     RBD: Still on clonazepam 0.5mg (prescribed by psychiatrist as also taking during the daytime to help with anxiety) and melatonin 9mg.  Doing well minimal dream enactment and describes no significant side effects or morning grogginess.  We again discussed his risks of RBD and the relationship between RBD and PD and related conditions.  Of note he is on cymbalta and suspects that this agent has worsened his dream enactment over the years.  Would not want to stop at this time as it is helping however with his mood and his MANUELITO is under control.  He indicates that he feels his olfaction is fine, he has some constipation and some orthostasis (has been diagnosed with POTS).      Discussed the potential utility of a skin biopsy to better understand his underlying condition and whether his RBD is related to alpha synuclein pathology.  He is interested.  I will place an order and [facility] will perform a prior authorizatin.      He has participated in NAPS1 and is interested in enrolling in NAPS 2 I will refer him to our research coordinator.     All questions were answered.     It is a great privilege being asked to participate in this patients care.  The patient has been advised on the importance in of never operating operating a motor vehicle while tired or sleepy.         I visited with the patient directly but also extensively reviewed chart and coordinated care. Total time spent in the care of this patient on 7-11 was 28 minutes.

## 2022-07-21 NOTE — PROGRESS NOTES
Urology Consult History and Physical  ERICK MAN  Name: Joel Pineda    MRN: [id number]   YOB: 1975       We were asked to see Joel Pineda at the request of Jing Priest NP for evaluation and treatment of pelvic pain.        Chief Complaint:   Pelvic pain    History is obtained from the patient            History of Present Illness:   Joel Pineda is a 47 year old male who is being seen for evaluation of lower abdominal and groin pain  He had been having intermittent lower abdominal pain for the last few months.  He does have history of diverticulitis and is status post a sigmoidectomy 8 years ago.  His symptoms have included bloating, nausea, and lower abdominal pain.  He was seen in the emergency room at [facility] on 2 occasions, most recently on 7/8/2022 with a CT abdomen pelvis with contrast with reading of some bladder wall thickening within nondistended bladder  Referred for evaluation of any urologic contribution  No change in pain with urination   Flow and stream are normal, no other LUTS  He has remote history of urinary retention following his sigmoidectomy, but has not had any urinary issues in the last several years  He also noted some groin and testicular pain and an ultrasound was performed which demonstrated left varicose veins  He is also hoping to have his testosterone checked as he is noted low energy level and low libido  His testosterone was checked in 2013 and noted to be 354           Past Medical History:     Past Medical History:   Diagnosis Date     Acne      Acquired hypothyroidism      Allergic state      Ankylosing spondylitis lumbar region (H)      Ankylosing spondylitis of sacral region (H)      Anxiety      Bipolar 2 disorder (H)      Chest pain     Chest pain, regulated w/BP meds. Clear arteries.     Chronic pain      DDD (degenerative disc disease), lumbar      Depressive disorder      Diabetes (H)      Diverticulosis      Facet arthritis of cervical region       Gastroesophageal reflux disease      Hypertension      IBS (irritable bowel syndrome)      Intracranial arachnoid cyst      Major depressive disorder, recurrent episode (H)     Multiple psych providers - they manage meds August 2015: Provigil induced severe mood dis-function      Major depressive disorder, recurrent episode, severe (H) 12/2/2020     MDD (major depressive disorder), recurrent severe, without psychosis (H) 7/21/2021     LLOYD (obstructive sleep apnea)- mild (AHI 11)      Polyneuropathy      Pulmonary embolism (H)      Skin exam, screening for cancer 12/3/2013     Sleep apnea      Uncomplicated asthma             Past Surgical History:     Past Surgical History:   Procedure Laterality Date     BACK SURGERY  10/2007    lumbar discectomy L5-S1     BIOPSY  09/15/2020    Colon Adenomas x2     COLONOSCOPY      Note: colonoscopy scheduled with [facility] on Friday, 9/4/15     COSMETIC SURGERY  2012    Nose Exterior - functional     GI SURGERY  08/2013    Sigmoidectomy     HERNIA REPAIR, UMBILICAL  08/23/2011    Dr. Evan whiting     INCISION AND DRAINAGE, ABSCESS, COMPLEX  08/23/2011    umbilical, Dr. Evan Beavers     LAPAROSCOPIC ASSISTED COLECTOMY LEFT (DESCENDING)  08/15/2013    Procedure: LAPAROSCOPIC ASSISTED COLECTOMY LEFT (DESCENDING);  Laparoscopic Hand Assisted Sigmoid Resection, Mobilization of Splenic Fissure, coloproctoscopy, *Latex Free Room* Anesthesia General with Pain block  ;  Surgeon: Aurora Justice MD;  Location: [facility] OR     NERVE SURGERY  08/18/2011    RF ablation @ L3-S1 @ [facility]     RECONSTRUCT NOSE AND SEPTUM (FUNCTIONAL)  10/14/2011    Procedure:RECONSTRUCT NOSE AND SEPTUM (FUNCTIONAL); Functional Septorhinoplasty, Turbinate Reduction, ; Surgeon:CEDRIC CUEVAS; Location:[facility] OR     SINUS SURGERY  10/01/2001    ethmoidectomy chronic sinusitis     SOFT TISSUE SURGERY  4/7/2022    Hidradenitis Suppurativa surgery            Social History:     Social History     Tobacco Use     Smoking  "status: Never Smoker     Smokeless tobacco: Never Used   Substance Use Topics     Alcohol use: Not Currently     Comment: occ 1/week       History   Smoking Status     Never Smoker   Smokeless Tobacco     Never Used            Family History:     Family History   Problem Relation Age of Onset     Musculoskeletal Disorder Mother         back     Anxiety Disorder Mother      Colon Polyps Mother      Ulcerative Colitis Mother         and ischemic small intestine, surgery     Hypertension Mother      Breast Cancer Mother      Osteoporosis Mother      Diabetes Mother         Type 2, Diagnosed in 2014     Depression Mother         Takes Cymbalta to help with chronic pain + depx     Thyroid Disease Mother         Hypothyroidism     Obesity Mother         Under much better control latter half of 2015     Musculoskeletal Disorder Father         back     Substance Abuse Father         Alcohol     Hypertension Father      Hyperlipidemia Father      Depression Father         Off meds for many years. Seems \"ok\"     Anxiety Disorder Father      Heart Disease Maternal Grandmother      Heart Disease Maternal Grandfather      Psychotic Disorder Paternal Grandfather      Suicide Paternal Grandfather      Depression Paternal Grandfather         Pediatrician. Committed suicide by pistol in 1990.     Musculoskeletal Disorder Brother         back     Depression Brother         Expressed as anger and moodiness     Substance Abuse Brother         Alcohol     Anxiety Disorder Brother      Substance Abuse Sister         Alcohol     Depression Sister         Mental Health Therapist, yet no anti-depressants?     Anxiety Disorder Sister         Mental Health Therapist, yet no anti-anxiety meds?     Other Cancer Other         Bladder     Substance Abuse Brother      Colon Cancer No family hx of      Crohn's Disease No family hx of      Anesthesia Reaction No family hx of      Cancer No family hx of         No family history of skin cancer "              Allergies:     Allergies   Allergen Reactions     Amoxicillin-Pot Clavulanate Difficulty breathing     Banana Shortness Of Breath     Pt reports organic Banana is okay.      Nitroglycerin Palpitations     Penicillins Anaphylaxis     Provigil [Modafinil] Shortness Of Breath     headache     Gadolinium Hives and Itching     Patient was premedicated for the contrast allergy. He did still have a reaction a few hours after injection. Hives and itching. Dr. Gomez told tech to inform pt he should only have contrast again in the future when premedicated and at a hospital. Not at an outpatient facility.      Ketoconazole      Topical cream caused swelling and itching     Dye [Contrast Dye] Other (See Comments) and Hives     Moderate flushing, CT contrast     Gabapentin      Other reaction(s): hives     Golimumab      Hives, bradycardia, face swelling     Naproxen      Other reaction(s): Bleeding Gums     Neurontin [Gabapentin] Hives     Moderate hives     Nortriptyline Hives     Nystatin Hives     Varicella Virus Vaccine Live      Rash     Flagyl [Metronidazole Hcl] Palpitations and Hives     Latex Rash     Metronidazole Palpitations, Other (See Comments) and Rash     dizziness (versus ciprofloxacin taken at same time)            Medications:     Current Outpatient Medications   Medication Sig     acetaminophen 500 MG CAPS Take 1,000 mg by mouth 2 times daily      albuterol (PROAIR HFA/PROVENTIL HFA/VENTOLIN HFA) 108 (90 Base) MCG/ACT inhaler Inhale 2 puffs into the lungs every 4 hours as needed for shortness of breath / dyspnea or wheezing     aspirin (ASA) 81 MG tablet Take 81 mg by mouth daily      cetirizine (ZYRTEC) 10 MG tablet Take 1 tablet (10 mg) by mouth At Bedtime     cholecalciferol (D3-50) 1250 mcg (37983 units) capsule TAKE ONE CAPSULE BY MOUTH EVERY 2 WEEKS.     clindamycin (CLINDAMAX) 1 % external gel For flares, start twice daily     clonazePAM (KLONOPIN) 0.5 MG tablet Take 0.5 mg by  mouth 2 times daily as needed      cyanocobalamin (CYANOCOBALAMIN) 1000 MCG/ML injection INJECT 1 ML INTO THE MUSCLE EVERY 30 DAYS     dronabinol (MARINOL) 5 MG capsule Take 1 capsule by mouth 2 times daily     DULoxetine (CYMBALTA) 60 MG capsule Take 120 mg by mouth daily      EPINEPHrine (ANY BX GENERIC EQUIV) 0.3 MG/0.3ML injection 2-pack Inject 0.3 mLs (0.3 mg) into the muscle once as needed for anaphylaxis     ergocalciferol (ERGOCALCIFEROL) 1.25 MG (49597 UT) capsule take 1 capsule by ORAL route every 2 weeks     eszopiclone (LUNESTA) 3 MG tablet Take 3 mg by mouth At Bedtime      etanercept (ENBREL SURECLICK) 50 MG/ML autoinjector Inject 50 mg Subcutaneous once a week . Hold for signs of infection, and seek medical attention.     famotidine (PEPCID) 20 MG tablet Prior to administration of Humira every 2 weeks (Patient taking differently: Take 20 mg by mouth every 7 days Prior to Enbrel Injections to prevent skin reaction)     fenofibrate (TRICOR) 48 MG tablet TAKE 1 TABLET BY MOUTH ONCE DAILY     fluticasone (FLONASE) 50 MCG/ACT nasal spray Spray 2 sprays into both nostrils daily     fluticasone-salmeterol (ADVAIR) 500-50 MCG/ACT inhaler Inhale 1 puff into the lungs every 12 hours     levothyroxine (SYNTHROID/LEVOTHROID) 75 MCG tablet TAKE ONE TABLET BY MOUTH EVERY MORNING     lidocaine (XYLOCAINE) 5 % external ointment APPLY TOPICALLY 4 TIMES DAILY AS NEEDED FOR PAIN     melatonin 3 MG tablet Take 13 mg by mouth nightly as needed      methocarbamol (ROBAXIN) 500 MG tablet TAKE 1 - 2 TABLETS BY MOUTH 4 TIMES DAILY AS NEEDED FOR MUSCLE SPASMS     metoclopramide (REGLAN) 5 MG tablet Take 5 mg by mouth 4 times daily as needed     metoprolol succinate ER (TOPROL-XL) 200 MG 24 hr tablet TAKE ONE TABLET BY MOUTH TWICE DAILY     montelukast (SINGULAIR) 10 MG tablet TAKE ONE TABLET BY MOUTH EVERY EVENING     nabumetone (RELAFEN) 500 MG tablet TAKE 1-2 TABLETS BY MOUTH TWICE DAILY WITH FOOD AS NEEDED FOR MODERATE PAIN  "    naloxone (NARCAN) 4 MG/0.1ML nasal spray Spray 1 spray (4 mg) into one nostril alternating nostrils once as needed for opioid reversal every 2-3 minutes until assistance arrives     olopatadine (PATADAY) 0.2 % ophthalmic solution Place 1 drop into both eyes daily     omega-3 acid ethyl esters (LOVAZA) 1 g capsule TAKE 2 CAPSULES BY MOUTH TWICE DAILY     omeprazole 20 MG tablet Take 20 mg by mouth daily      ondansetron (ZOFRAN-ODT) 8 MG ODT tab Take 8 mg by mouth every 8 hours as needed for nausea     order for DME Equipment being ordered: lumbosacral belt/brace     order for DME Respironics REMSTAR 60 Series Auto CPAP 9-13 cm H2O, Wisp nasal mask w/a large cushion and a chinstrap     oxyCODONE (ROXICODONE) 5 MG tablet Take 1 tablet (5 mg) by mouth every 6 hours as needed for pain (maximum 6 tablet(s) per day)     pregabalin (LYRICA) 150 MG capsule Take 1 capsule (150 mg) by mouth 3 times daily (Patient taking differently: Take 150 mg by mouth 2 times daily)     pyridostigmine (MESTINON) 60 MG tablet Take 1 tablet by mouth 3 times daily     ramipril (ALTACE) 10 MG capsule TAKE 1 CAPSULE BY MOUTH ONE TIME DAILY     risperiDONE (RISPERDAL) 0.5 MG tablet Take 0.5 mg by mouth 2 times daily as needed     rizatriptan (MAXALT-MLT) 5 MG ODT DISSOLVE 1 TABLET BY MOUTH AT ONSET OF HEADACHE     rosuvastatin (CRESTOR) 40 MG tablet TAKE ONE TABLET BY MOUTH ONCE DAILY     sodium fluoride 1.1 % CREA At Bedtime     syringe/needle, disp, (BD INTEGRA SYRINGE) 25G X 1\" 3 ML MISC USE WITH B12 INJECTIONS     terbinafine (LAMISIL) 1 % external cream Apply topically 2 times daily     valACYclovir (VALTREX) 500 MG tablet TAKE ONE TABLET BY MOUTH ONCE DAILY     vitamin B complex with vitamin C (STRESS TAB) tablet Take 1 tablet by mouth daily     ZINC SULFATE-VITAMIN C MT Take 1 tablet by mouth daily     No current facility-administered medications for this visit.             Review of Systems:     Skin: negative  Eyes: " negative  Ears/Nose/Throat: negative  Respiratory: No shortness of breath, dyspnea on exertion, cough, or hemoptysis  Cardiovascular: negative  Gastrointestinal: as above  Genitourinary: as above  Musculoskeletal: negative  Neurologic: negative  Psychiatric: negative  Hematologic/Lymphatic/Immunologic: negative  Endocrine: negative          Physical Exam:     PHYSICAL EXAM  Patient is a 47 year old  male   Vitals: There were no vitals taken for this visit.  There is no height or weight on file to calculate BMI.  General Appearance Adult:   Alert, no acute distress, oriented  HENT: throat/mouth:normal, good dentition  Lungs: no respiratory distress, or pursed lip breathing  Heart: No obvious jugular venous distension present  Abdomen: obesely - distended  Musculoskeltal: extremities normal, no peripheral edema  Skin: no suspicious lesions or rashes  Neuro: Alert, oriented, speech and mentation normal  Psych: affect and mood normal          Data:   All laboratory data reviewed:    UA RESULTS:  Recent Labs   Lab Test 07/13/22  1438   COLOR Yellow   APPEARANCE Clear   URINEGLC Negative   URINEBILI Negative   URINEKETONE Negative   SG <=1.005   UBLD Trace*   URINEPH 6.5   PROTEIN Negative   UROBILINOGEN 0.2   NITRITE Negative   LEUKEST Negative   RBCU 0-2   WBCU None Seen      PSA   Date Value Ref Range Status   09/28/2015 0.33 0 - 4 ug/L Final      Lab Results   Component Value Date    CR 0.95 02/10/2022    CR 1.28 10/16/2020      Component      Latest Ref Rng & Units 3/19/2013   Percent Testosterone Free      2.0 - 4.0 % 3.0   Testosterone Total      240 - 950 ng/dL 354   Testosterone Free      8 - 30 ng/dL 10.6     IMAGING:  All pertinent imaging reviewed:    All imaging studies reviewed by me.  I personally reviewed these imaging films.  A formal report from radiology will follow.    CT ABD/PEL 7/7/22:  FINDINGS:   LOWER CHEST: Normal.     HEPATOBILIARY: Diffuse fatty infiltration of the liver.     PANCREAS: Normal.      SPLEEN: Normal.     ADRENAL GLANDS: Normal.     KIDNEYS/BLADDER: Small left renal cyst. No renal calculi or hydronephrosis.   Diffuse bladder wall thickening, incompletely distended.     BOWEL: Normal appendix. Diverticula descending and sigmoid colon, without evidence for diverticulitis or bowel obstruction. Surgical anastomosis sigmoid colon.     LYMPH NODES: Normal.     VASCULATURE: Unremarkable.     PELVIC ORGANS: Normal.     MUSCULOSKELETAL: Degenerative disc disease L4 and L5 levels.     IMPRESSION:   1.  Diverticulosis descending and sigmoid colon, without evidence for diverticulitis.   2.  Diffuse bladder wall thickening. This was not present on the study from 06/08/2022 and should be related to incomplete distention. Bladder wall hypertrophy or cystitis possible.   3.  No other findings to account for the patient's left lower quadrant pain.      US TESTICULAR 7/14/2022:  Findings:  The right testicle measures 5 x 2.5 x 3.2 cm and the left testicle  measures 4.5 x 2 x 2.9 cm. No intratesticular mass. Normal blood flow.     Both the right and left epididymis are within normal limits.  There is  no evidence of a  hydrocele.     Left-sided varicocele.                                                          Impression:   1.  Left-sided varicocele.         Impression and Plan:   Impression:   47-year-old man with complex medical history including chronic pain, prior diverticulitis status post sigmoid colectomy, now with lower abdominal pain and some groin pain with an incidental left varicocele      Plan:   Lower abdominal pain  - I reviewed his labs including a normal urinalysis, normal serum creatinin  - I reviewed the CT report and will have the images pushed to our system.  We discussed the nonspecific nature of bladder wall thickening, especially given that his bladder was nondistended and that this does not indicate cystitis  - He is not having any other urinary issues or symptoms and his pain does not  seem to be related to bladder filling or emptying  - We discussed that at this time, I do not think the abdominal pain is related to his bladder or urinary tract    Left varicocele  - We discussed that I will have him see my partner Dr. Khoury for an office evaluation and discussion of options for his left varicocele  - Order for repeat morning testosterone placed which should be done prior to this office visit so that this could be discussed as well     Thank you for the kind consultation.    Time spent: 20 minutes spent on the date of the encounter doing chart review, history and exam, documentation and further activities as noted above.    Bobby Collazo MD   Urology  [facility]  [facility] Phone: [phone]  [facility] Phone: [phone]       Joel Pineda  who is being evaluated via a billable video visit.      How would you like to obtain your AVS? MyChart  If the video visit is dropped, the invitation should be resent by: Send to e-mail at: [email or web address]  Will anyone else be joining your video visit? No    Video-Visit Details    Type of service:  Video Visit    Video Start Time: 8:08 AM    Video End Time:8:28 AM    Originating Location (pt. Location): Home    Distant Location (provider location):  [facility]     Platform used for Video Visit: [employer]

## 2022-07-22 RX ORDER — CYANOCOBALAMIN 1000 UG/ML
INJECTION, SOLUTION INTRAMUSCULAR; SUBCUTANEOUS
Qty: 10 ML | Refills: 0 | Status: SHIPPED | OUTPATIENT
Start: 2022-07-22 | End: 2022-12-14

## 2022-07-25 ENCOUNTER — LAB (OUTPATIENT)
Dept: LAB | Facility: CLINIC | Age: 47
End: 2022-07-25
Payer: MEDICARE

## 2022-07-25 DIAGNOSIS — R68.82 LOW LIBIDO: ICD-10-CM

## 2022-07-25 DIAGNOSIS — E78.1 HYPERTRIGLYCERIDEMIA: Primary | ICD-10-CM

## 2022-07-25 DIAGNOSIS — I86.1 LEFT VARICOCELE: ICD-10-CM

## 2022-07-25 LAB
BACTERIA SPEC CULT: NO GROWTH
SHBG SERPL-SCNC: 15 NMOL/L (ref 11–80)

## 2022-07-25 PROCEDURE — 36415 COLL VENOUS BLD VENIPUNCTURE: CPT

## 2022-07-25 PROCEDURE — 84270 ASSAY OF SEX HORMONE GLOBUL: CPT

## 2022-07-25 PROCEDURE — 84403 ASSAY OF TOTAL TESTOSTERONE: CPT

## 2022-07-26 ENCOUNTER — TELEPHONE (OUTPATIENT)
Dept: DERMATOLOGY | Facility: CLINIC | Age: 47
End: 2022-07-26

## 2022-07-26 ENCOUNTER — TRANSFERRED RECORDS (OUTPATIENT)
Dept: HEALTH INFORMATION MANAGEMENT | Facility: CLINIC | Age: 47
End: 2022-07-26

## 2022-07-26 ENCOUNTER — MYC MEDICAL ADVICE (OUTPATIENT)
Dept: UROLOGY | Facility: CLINIC | Age: 47
End: 2022-07-26

## 2022-07-26 ENCOUNTER — MYC MEDICAL ADVICE (OUTPATIENT)
Dept: PALLIATIVE MEDICINE | Facility: CLINIC | Age: 47
End: 2022-07-26

## 2022-07-26 NOTE — TELEPHONE ENCOUNTER
----- Message from Drake Tilley MD sent at 7/25/2022  4:48 PM CDT -----  Please call the patient with lab results.    The fungal culture from the groin is finalized. No fungus grew. Sometimes partially treated rashes won't grow anything even though fungus was the original problem. It looks like his follow up with me this week was cancelled. I hope that means it is finally getting better.     Thank you.

## 2022-07-26 NOTE — TELEPHONE ENCOUNTER
Drake Tilley MD  [facility] [place]  Please call the patient with lab results.     The fungal culture from the groin is finalized. No fungus grew. Sometimes partially treated rashes won't grow anything even though fungus was the original problem. It looks like his follow up with me this week was cancelled. I hope that means it is finally getting better.     Thank you.               Associated Results      Fungus skin hair nail culture  Order: [id number]   Status: Final result     Visible to patient: Yes (seen)     Dx: Rash    Specimen Information: Groin, Right; Swab         3 Result Notes    Culture No Growth            Resulting Agency: [facility]           Specimen Collected: 06/27/22  5:50 PM Last Resulted: 07/25/22  8:19 AM        Order Details      View Encounter      Lab and Collection Details      Routing      Result History             Result Care Coordination        Result Notes     Megha Blankenship LPN   7/26/2022  8:05 AM CDT Back to Top        Called and informed patient of results. Patient verbalized understanding and had no further questions or concerns. Patient states the areas have healed and no longer open. The areas are just pink in color and patient had no concerns. Will forward to Dr. Tilley as NIRAJ Souza MD   7/25/2022  4:48 PM CDT         Please call the patient with lab results.     The fungal culture from the groin is finalized. No fungus grew. Sometimes partially treated rashes won't grow anything even though fungus was the original problem. It looks like his follow up with me this week was cancelled. I hope that means it is finally getting better.      Thank you.     Drake Tilley MD   7/5/2022  9:04 AM CDT         I updated the patient on screening lab results by [facility]. Studies have been unrevealing so far. Fungal culture still in process, but no growth to date.      I asked him to discontinue hydrocortisone and mupirocin as there has  been little forward progress. Start terbinafine cream. Will consider a biopsy if not improving at his follow up appointment.

## 2022-07-27 LAB
TESTOST FREE SERPL-MCNC: 5.64 NG/DL
TESTOST SERPL-MCNC: 203 NG/DL (ref 240–950)

## 2022-07-28 ENCOUNTER — MYC REFILL (OUTPATIENT)
Dept: FAMILY MEDICINE | Facility: CLINIC | Age: 47
End: 2022-07-28

## 2022-07-28 DIAGNOSIS — M51.369 DDD (DEGENERATIVE DISC DISEASE), LUMBAR: ICD-10-CM

## 2022-07-28 DIAGNOSIS — M45.8 ANKYLOSING SPONDYLITIS OF SACRAL REGION (H): ICD-10-CM

## 2022-07-28 RX ORDER — OXYCODONE HYDROCHLORIDE 5 MG/1
5 TABLET ORAL EVERY 6 HOURS PRN
Qty: 35 TABLET | Refills: 0 | Status: SHIPPED | OUTPATIENT
Start: 2022-07-28 | End: 2022-08-19

## 2022-07-31 NOTE — ED TRIAGE NOTES
Pt comes in for rash on face, raised on left temple that has gotten worse over last couple weeks. Pt has been increasing Lamictal dose, started at 200mg and now at 300mg. Pt also has sore throat, nasal pain and random intermittent sharp spots of pain all over body.   yes

## 2022-08-03 ENCOUNTER — OFFICE VISIT (OUTPATIENT)
Dept: UROLOGY | Facility: CLINIC | Age: 47
End: 2022-08-03
Payer: MEDICARE

## 2022-08-03 DIAGNOSIS — E66.01 MORBID OBESITY (H): ICD-10-CM

## 2022-08-03 DIAGNOSIS — N18.30 STAGE 3 CHRONIC KIDNEY DISEASE, UNSPECIFIED WHETHER STAGE 3A OR 3B CKD (H): ICD-10-CM

## 2022-08-03 DIAGNOSIS — E29.1 HYPOGONADISM MALE: Primary | ICD-10-CM

## 2022-08-03 DIAGNOSIS — G90.1 DYSAUTONOMIA (H): ICD-10-CM

## 2022-08-03 LAB
FERRITIN SERPL-MCNC: 278 NG/ML (ref 26–388)
FSH SERPL IRP2-ACNC: 6.9 MIU/ML (ref 1.5–12.4)
LH SERPL-ACNC: 3.3 MIU/ML (ref 1.7–8.6)
PROLACTIN SERPL 3RD IS-MCNC: 11 NG/ML (ref 4–15)

## 2022-08-03 PROCEDURE — 82728 ASSAY OF FERRITIN: CPT | Performed by: UROLOGY

## 2022-08-03 PROCEDURE — 36415 COLL VENOUS BLD VENIPUNCTURE: CPT | Performed by: UROLOGY

## 2022-08-03 PROCEDURE — 84146 ASSAY OF PROLACTIN: CPT | Performed by: UROLOGY

## 2022-08-03 PROCEDURE — 99213 OFFICE O/P EST LOW 20 MIN: CPT | Performed by: UROLOGY

## 2022-08-03 PROCEDURE — 84403 ASSAY OF TOTAL TESTOSTERONE: CPT | Performed by: UROLOGY

## 2022-08-03 PROCEDURE — 83002 ASSAY OF GONADOTROPIN (LH): CPT | Performed by: UROLOGY

## 2022-08-03 PROCEDURE — 83001 ASSAY OF GONADOTROPIN (FSH): CPT | Performed by: UROLOGY

## 2022-08-03 PROCEDURE — 84270 ASSAY OF SEX HORMONE GLOBUL: CPT | Performed by: UROLOGY

## 2022-08-03 RX ORDER — GUAIFENESIN, PSEUDOEPHEDRINE HYDROCHLORIDE 600; 60 MG/1; MG/1
1 TABLET, EXTENDED RELEASE ORAL EVERY 12 HOURS
COMMUNITY
Start: 2022-01-01 | End: 2024-04-23

## 2022-08-03 RX ORDER — BUPROPION HYDROCHLORIDE 150 MG/1
150 TABLET ORAL EVERY MORNING
COMMUNITY
Start: 2022-07-22 | End: 2023-04-25 | Stop reason: DRUGHIGH

## 2022-08-03 ASSESSMENT — SLEEP AND FATIGUE QUESTIONNAIRES
HOW LIKELY ARE YOU TO NOD OFF OR FALL ASLEEP WHILE SITTING AND TALKING TO SOMEONE: WOULD NEVER DOZE
HOW LIKELY ARE YOU TO NOD OFF OR FALL ASLEEP WHILE SITTING AND READING: SLIGHT CHANCE OF DOZING
HOW LIKELY ARE YOU TO NOD OFF OR FALL ASLEEP WHILE SITTING INACTIVE IN A PUBLIC PLACE: WOULD NEVER DOZE
HOW LIKELY ARE YOU TO NOD OFF OR FALL ASLEEP IN A CAR, WHILE STOPPED FOR A FEW MINUTES IN TRAFFIC: WOULD NEVER DOZE
HOW LIKELY ARE YOU TO NOD OFF OR FALL ASLEEP WHILE SITTING QUIETLY AFTER LUNCH WITHOUT ALCOHOL: SLIGHT CHANCE OF DOZING
HOW LIKELY ARE YOU TO NOD OFF OR FALL ASLEEP WHEN YOU ARE A PASSENGER IN A CAR FOR AN HOUR WITHOUT A BREAK: SLIGHT CHANCE OF DOZING
HOW LIKELY ARE YOU TO NOD OFF OR FALL ASLEEP WHILE LYING DOWN TO REST IN THE AFTERNOON WHEN CIRCUMSTANCES PERMIT: MODERATE CHANCE OF DOZING
HOW LIKELY ARE YOU TO NOD OFF OR FALL ASLEEP WHILE WATCHING TV: SLIGHT CHANCE OF DOZING

## 2022-08-03 NOTE — PROGRESS NOTES
HPI:  Joel Pineda is a 47 year old male being seen in urology follow-up from Dr. Collazo.    Pt has complained of right testis/ pelvic pain.  History of sigmoidectomy.  Had has suprapubic discomfort since that surgery.  CT abdomen/pelvis has not been revealing.    Scrotal ultrasound 7/14/22 demonstrated a left varicocele.  Pt has symptoms of low energy- testosterone level 2013 was normal but recent morning testosterone was low.    History of spine surgery.  History of chronic pain.  He does have opioids on his medication list.    Exam:  General: Alert, oriented, nad.  Pleasant and conversant.  Eyes: anicteric, EOMI.  Pulse: regular  Resps: normal, non-labored.  Abdomen:  Nondistended.  Neurological - no tremors  Skin - no discoloration/ lesions noted   exam   Phallus is a but burried  Testes ++, anodular, nontender.  Vas and epididymis present and normal bilaterally  Possible grade II left varicocele noted.  No right varicocele.  MONICA deferred.     Review of Imaging:  The following imaging exams were independently viewed and interpreted by me and discussed with patient:  Scrotal ultrasound 7/14/22 showed a large left varicocele.    Review of Labs:  The following labs were reviewed by me and discussed with the patient:  Recent Results (from the past 720 hour(s))   UA with Microscopic reflex to Culture - lab collect    Collection Time: 07/13/22  2:38 PM    Specimen: Urine, Clean Catch   Result Value Ref Range    Color Urine Yellow Colorless, Straw, Light Yellow, Yellow    Appearance Urine Clear Clear    Glucose Urine Negative Negative mg/dL    Bilirubin Urine Negative Negative    Ketones Urine Negative Negative mg/dL    Specific Gravity Urine <=1.005 1.003 - 1.035    Blood Urine Trace (A) Negative    pH Urine 6.5 5.0 - 7.0    Protein Albumin Urine Negative Negative mg/dL    Urobilinogen Urine 0.2 0.2, 1.0 E.U./dL    Nitrite Urine Negative Negative    Leukocyte Esterase Urine Negative Negative   Urine Microscopic     Collection Time: 07/13/22  2:38 PM   Result Value Ref Range    Bacteria Urine None Seen None Seen /HPF    RBC Urine 0-2 0-2 /HPF /HPF    WBC Urine None Seen 0-5 /HPF /HPF    Squamous Epithelials Urine None Seen None Seen /LPF   Sex Hormone Binding Globulin    Collection Time: 07/25/22  8:08 AM   Result Value Ref Range    Sex Hormone Binding Globulin 15 11 - 80 nmol/L   Testosterone Free and Total    Collection Time: 07/25/22  8:08 AM   Result Value Ref Range    Free Testosterone Calculated 5.64 ng/dL    Testosterone Total 203 (L) 240 - 950 ng/dL     Normal UA   Low testosterone.    Assessment & Plan   1. Hypogonadism   a. Further labs to determine primary versus secondary hypogonadism.  b. Likely refer to endocrine if needing testosterone replacement therapy.  2. Left varicocele   a. Subclinical left varicocele- varicocele not obvious on physical exam today.  This seems like an incidental finding and I would recommended observation as he's not having left scrotal pain.    Yoan Khoury MD  [facility]      ==========================      Additional Coding Information:    Problems:  3 -- one acute uncomplicated illness or injury    Data Reviewed  Normal UA  Low total testosterone.    Tests ordered: 5 labs ordered    Level of risk:  3- low/ observation.    Time spent:  17 minutes spent on the date of the encounter doing chart review, history and exam, documentation and further activities per the note

## 2022-08-04 LAB — SHBG SERPL-SCNC: 17 NMOL/L (ref 11–80)

## 2022-08-05 ENCOUNTER — TELEPHONE (OUTPATIENT)
Dept: FAMILY MEDICINE | Facility: CLINIC | Age: 47
End: 2022-08-05

## 2022-08-05 LAB
TESTOST FREE SERPL-MCNC: 5.12 NG/DL
TESTOST SERPL-MCNC: 194 NG/DL (ref 240–950)

## 2022-08-06 PROBLEM — N18.30 STAGE 3 CHRONIC KIDNEY DISEASE, UNSPECIFIED WHETHER STAGE 3A OR 3B CKD (H): Status: ACTIVE | Noted: 2022-08-06

## 2022-08-06 ASSESSMENT — ANXIETY QUESTIONNAIRES
1. FEELING NERVOUS, ANXIOUS, OR ON EDGE: NEARLY EVERY DAY
5. BEING SO RESTLESS THAT IT IS HARD TO SIT STILL: NOT AT ALL
7. FEELING AFRAID AS IF SOMETHING AWFUL MIGHT HAPPEN: SEVERAL DAYS
GAD7 TOTAL SCORE: 11
GAD7 TOTAL SCORE: 11
3. WORRYING TOO MUCH ABOUT DIFFERENT THINGS: MORE THAN HALF THE DAYS
7. FEELING AFRAID AS IF SOMETHING AWFUL MIGHT HAPPEN: SEVERAL DAYS
4. TROUBLE RELAXING: SEVERAL DAYS
IF YOU CHECKED OFF ANY PROBLEMS ON THIS QUESTIONNAIRE, HOW DIFFICULT HAVE THESE PROBLEMS MADE IT FOR YOU TO DO YOUR WORK, TAKE CARE OF THINGS AT HOME, OR GET ALONG WITH OTHER PEOPLE: VERY DIFFICULT
8. IF YOU CHECKED OFF ANY PROBLEMS, HOW DIFFICULT HAVE THESE MADE IT FOR YOU TO DO YOUR WORK, TAKE CARE OF THINGS AT HOME, OR GET ALONG WITH OTHER PEOPLE?: VERY DIFFICULT
6. BECOMING EASILY ANNOYED OR IRRITABLE: MORE THAN HALF THE DAYS
2. NOT BEING ABLE TO STOP OR CONTROL WORRYING: MORE THAN HALF THE DAYS

## 2022-08-06 ASSESSMENT — PAIN SCALES - PAIN ENJOYMENT GENERAL ACTIVITY SCALE (PEG)
AVG_PAIN_PASTWEEK: 4
PEG_TOTALSCORE: 5.33
AVG_PAIN_PASTWEEK: 4
INTERFERED_ENJOYMENT_LIFE: 7
INTERFERED_GENERAL_ACTIVITY: 5
INTERFERED_ENJOYMENT_LIFE: 7
PEG_TOTALSCORE: 5.33
INTERFERED_GENERAL_ACTIVITY: 5

## 2022-08-07 NOTE — RESULT ENCOUNTER NOTE
"Dear Tito,     Here are your recent results.     The testosterone level is low.  There are no abnormalities in the hormones that help regulate testosterone.  If  you are still taking oxycodone, I think the low testosterone is caused from that - this tends to give men a low testosterone \"set point\".  We also see this in men who carry extra weight.    I am sending a referral to endocrine clinic for their doctors to discuss testosterone replacement options with you. Alternatively if you were able to discontinue the oxycodone, you could probably avoid starting testosterone.    Please let us know if you have any questions or concerns.     David GREER "

## 2022-08-09 ASSESSMENT — ENCOUNTER SYMPTOMS
NAUSEA: 1
MYALGIAS: 1
ABDOMINAL PAIN: 1
MEMORY LOSS: 1
BACK PAIN: 1
HEADACHES: 1
STIFFNESS: 1
LIGHT-HEADEDNESS: 1
NECK PAIN: 1
DECREASED CONCENTRATION: 1
ARTHRALGIAS: 1
FATIGUE: 1
NERVOUS/ANXIOUS: 1
DEPRESSION: 1

## 2022-08-09 ASSESSMENT — ANXIETY QUESTIONNAIRES: GAD7 TOTAL SCORE: 11

## 2022-08-10 ENCOUNTER — OFFICE VISIT (OUTPATIENT)
Dept: SLEEP MEDICINE | Facility: CLINIC | Age: 47
End: 2022-08-10
Payer: MEDICARE

## 2022-08-10 VITALS
WEIGHT: 315 LBS | BODY MASS INDEX: 36.45 KG/M2 | OXYGEN SATURATION: 96 % | SYSTOLIC BLOOD PRESSURE: 122 MMHG | HEIGHT: 78 IN | HEART RATE: 74 BPM | DIASTOLIC BLOOD PRESSURE: 72 MMHG

## 2022-08-10 DIAGNOSIS — G47.52 RBD (REM BEHAVIORAL DISORDER): Primary | ICD-10-CM

## 2022-08-10 PROCEDURE — 11105 PUNCH BX SKIN EA SEP/ADDL: CPT | Performed by: PSYCHIATRY & NEUROLOGY

## 2022-08-10 PROCEDURE — 11104 PUNCH BX SKIN SINGLE LESION: CPT | Performed by: PSYCHIATRY & NEUROLOGY

## 2022-08-10 NOTE — PROGRESS NOTES
Provider: Joel Damico MD  Assistant: Bennett Goltz    Procedure: Skin Punch biopsy, 3mm, for pSyn deposition  Diagnosis: RBD, Ataxia  Locations: 3   - R posterior cervical region, ~3cm lateral from C7  - R lateral thigh, ~10cm above knee  - R lateral calf, ~10cm above lateral malleolus    Informed Consent: The patient was informed of the nature of the procedure and the inherent risks, including unattractive scarring, bleeding, and infection. The purpose of this procedure, benefits and limitations were discussed. We discussed possible contraindications (E.g. allergy to lidocaine, blood thinners, etc), which he denied. He consented to the procedure.    Site(s) Preparation: alcohol wipe  Local Anesthetic: 1 mL plain 1% Lidocaine injected to each biopsy site, respectively.  Procedure: A 3 mm punch biopsy was obtained at above listed sites.     Post-procedure: Hemostasis was achieved with minimal applied pressure. Bandaid dressing was placed. Patient tolerated the procedure without complication.      AVS with appropriate aftercare recommendations given to patient. Follow up with any wound problems, poor healing, or signs of infection.     Bx sent for pSyn-deposition, will call patient with results (~3-4wks).

## 2022-08-11 ENCOUNTER — VIRTUAL VISIT (OUTPATIENT)
Dept: PALLIATIVE MEDICINE | Facility: CLINIC | Age: 47
End: 2022-08-11
Payer: MEDICARE

## 2022-08-11 DIAGNOSIS — M50.30 DDD (DEGENERATIVE DISC DISEASE), CERVICAL: ICD-10-CM

## 2022-08-11 DIAGNOSIS — M51.369 DDD (DEGENERATIVE DISC DISEASE), LUMBAR: ICD-10-CM

## 2022-08-11 DIAGNOSIS — M48.02 CERVICAL STENOSIS OF SPINAL CANAL: ICD-10-CM

## 2022-08-11 DIAGNOSIS — M54.50 CHRONIC MIDLINE LOW BACK PAIN WITHOUT SCIATICA: ICD-10-CM

## 2022-08-11 DIAGNOSIS — G89.29 CHRONIC INTRACTABLE PAIN: Primary | ICD-10-CM

## 2022-08-11 DIAGNOSIS — G89.29 CHRONIC MIDLINE LOW BACK PAIN WITHOUT SCIATICA: ICD-10-CM

## 2022-08-11 DIAGNOSIS — M62.838 MUSCLE SPASM: ICD-10-CM

## 2022-08-11 PROCEDURE — 99214 OFFICE O/P EST MOD 30 MIN: CPT | Mod: 95 | Performed by: NURSE PRACTITIONER

## 2022-08-11 RX ORDER — METHOCARBAMOL 500 MG/1
TABLET, FILM COATED ORAL
Qty: 240 TABLET | Refills: 1 | Status: SHIPPED | OUTPATIENT
Start: 2022-08-11 | End: 2022-11-10

## 2022-08-11 ASSESSMENT — PAIN SCALES - GENERAL: PAINLEVEL: MODERATE PAIN (5)

## 2022-08-11 NOTE — PATIENT INSTRUCTIONS
After Visit Instructions:     Thank you for coming to [facility] for your care. It is my goal to partner with you to help you reach your optimal state of health.   Continue daily self-care, identifying contributing factors, and monitoring variations in pain level. Continue to integrate self-care into your life.        Physical therapy: YES, Continue per therapist's recommendations. Order written to add neck PT sent to [facility].   30 minute Clinic follow-up with JOCELYN Zavala NP-C as scheduled on 9/15/22 at 1 pm.   Procedures recommended: Cervical and Lumbar trigger point injections with steroid. Schedule with Dr Montgomery.    Medication Management :   Continue current medications.  Methocarbamol refilled.       JOCELYN Padgett, NP-C  [facility]  [facility] - Monday and Friday  [facility] - Tuesday  [facility] - Thursday    Be sure to request ALL medication refills 5 days prior to the due date unless you will see your medical provider in an appointment before the due date.  This is YOUR responsibility. Do not expect same day refills. If you do not plan ahead you may run out of medications.   NO early refills are allowed. It is your responsibility to manage your medications responsibility and keep them safely stored. Lost or destroyed medications WILL NOT be replaced    Scheduling/Clinic telephone Number for ALL locations:  [phone]    After Hours On-Call Service:  [phone]    Call with any questions about your care and for scheduling assistance.   Calls are returned Monday through Friday between 8 AM and 4:00 PM. We usually get back to you within 2 business days depending on the issue/request.    If we are prescribing your medications:  For opioid medication refills, call the clinic or send a [employer] message 7 days in advance.  Please include:  Your name and date of birth.   Name of requested medication  Name of the pharmacy.  For non-opioid  medications, call your pharmacy directly to request a refill. Please allow 3-4 days to be processed.   Per [place] State Law:  All controlled substance prescriptions must be filled within 30 days of being written.    For those controlled substances allowing refills, pickup must occur within 30 days of last fill.      We believe regular attendance is key to your success in our program!    Any time you are unable to keep your appointment we ask that you call us at least 24 hours in advance to cancel.This will allow us to offer the appointment time to another patient.   Multiple missed appointments may lead to dismissal from the clinic.

## 2022-08-11 NOTE — PROGRESS NOTES
[facility]      Joel Pineda is a 47 year old male who is being evaluated via a billable virtual visit.      VIDEO VISIT   How would you like to obtain your AVS? MyChart  If you are dropped from the video visit, the video invite should be resent to: Text to cell phone: [phone]  Will anyone else be joining your video visit? YES,  Is patient CURRENTLY in [place]? YES    Alexandre Curran MA    If patient encounters technical issues they should call [phone]    Video-Visit Details  Type of service:  Video Visit  Video Start Time: 2:16 PM  Video End Time: 2:40 PM  Total Face to Face Time: 24 minutes   Originating Location (pt. Location): Home  Distant Location (provider location):  [facility] [place]   Platform used for Video Visit: [employer]Maker CRMJose Carlos      PEG Score 5/11/2022 6/30/2022 8/11/2022   PEG Total Score 7.33 5.33 6.67          CHIEF COMPLAINT: Chronic pain    INTERVAL HISTORY:  Last seen on 6/30/22.        Recommendations/plan at the last visit included:        Since last visit:   - Had 3 skin punch biopsies yesterday looking for cutaneous nerve issues that may be contributing to REM sleep disorder. Caused quit a bit of bleeding and pain.   - Colon spasm are reduced. Baclofen stopped due to cognitive issues, couldn't understand common words and unable to figure out how to do simple tasks like how to hold silverware.   -   Taking Methocarbamol for neck and low back tightness.Somewhat helpful but not as much as he'd like. Has taken many other muscle relaxers but have all been discontinued due to lack of efficacy or due to insurance issues.  Orthology PT for low back. Continues to go in for other chronic foot pain. Does daily HEP.   - Saw a chiropractor for cervical trigger points with an activator and they were very painful for several days. Would like to try TPIs with steroid to see if they produce longer lasting pain & muscle tightness relief.     Pain Information Today:  Score: Moderate Pain (5)/10. Location of pain: multifocal    Annual Requirements last collected:  N/A     Current Pain Relevant Medications:    Acetaminophen 500 mg BID & with Oxycodone.   Cymbalta 120 mg in AM  Lyrica 150 mg BID  Methocarbamol 500 mg 1-2 QID PRN  Nabumetone 500 mg 1-2 times a day  Lidocaine gel topically 2-3 times daily     Pain Related Controlled Substances:   Clonazepam 0.5 mg at HS. Occasionally 1 tab during the day.  Lunesta 3 mg  Oxycodone 5 mg 1-2 tabs per day PRN              Total opiate dose: 7.5-15 MME     Previous Pain Relevant Medications: (H--helped; HI--Helped initially; SWH--Somewhat helpful; NH--No help; W--worse; SE--side effects; ?--Unsure if helpful)   NOTE: This medication information taken from patient's intake form, not medical records.               Opiates: Oxycodone: H, Tramadol:[facility]. SE, Codeine: NH              NSAIDS: Celebrex: [facility], Nabumetone:H, Naproxen: SE              Anti-migraine medications: Midrin? , Maxalt:H              Muscle Relaxants: Baclofen:[facility], Flexeril:H, Tizaidine:?, Methocarbamol:?              Neuropathics: Lyrica:H  Anti-depressants: Abilify:SE, Brintellix: NH, Wellbutrin: ?, Cymbalta: NH, Nortriptyline: NH, Seroquel:   [facility], Risperdal: NH, Effexor:?, Cymbalta ?              Anxiety medications: Xanax: H, Klonpin: H, lorazepam: H                  Topicals: Lidocaine:H              Sleep medications:Belsomra: NH, Melatonin:H, Trazodone NH, Ambien:NH              Other medications not covered above: Humira: All, Enbrel; H, dicyclomine:H, Medrol:[facility], Prednisone:H  This will be added at a later date when new patient packet is available.      Any illicit drug use: denies   EtOH use: none   Caffeine use: 6-8 per day   Nicotine use: None     Past Pain Treatments:               Pain Clinic:   Yes    [facility] pain management: For spinal injections with Dr Nixdorf, [facility]: injections, nerve ablation, [facility] for SCS treatment.  Did trial but did not find it  angus   [facility] chronic pain program.                   PT: Yes  For other reasons. Neck, back and feet              Psychologist: Yes ongoing with private therapist.at  [facility].               Chiropractor: Yes years ago              Acupuncture: Yes [facility]              Pharmacotherapy:                           Opioids: Yes                            Non-opioids:    Yes               TENs Unit:Yes H for back               Injections: Yes Many for neck and back               Surgeries related to pain: Yes               October 2007 L5-S1 laminectomy, micro discectomy          THE 4 As OF OPIOID MAINTENANCE ANALGESIA    Analgesia: Is pain relief clinically significant? YES   Activity: Is patient functional and able to perform Activities of Daily Living? YES   Adverse effects: Is patient free from adverse side effects from opiates? YES   Adherence to Rx protocol: Is patient adhering to Controlled Substance Agreement and taking medications ONLY as ordered? YES     Is Narcan prescribed for opiate use >50 MME daily or concurrent use of opiates and benzodiazepines?  Yes prescribed by this writer 8/10/2020    [place] Board of Pharmacy Data Base Reviewed:    YES; As expected, no concern for misuse/abuse of controlled medications based on this report. Reviewed [facility] August 10, 2022- no concerning fills.    _______________________________________________      Physical Exam not completed due to virtual visit.     General: Verbal, alert and no distress   Psychiatric:  affect and mood normal, mentation appears normal.     DIRE Score for ongoing opioid management is calculated as follows:    Diagnosis = 3 pts (advanced condition; severe pain/objective findings)    Intractability = 3 pts (patient fully engaged but inadequate response to treatments)    Risk        Psych = 3 pts (no significant personality dysfunction/mental illness; good communication with clinic)         Chem Hlth = 3 pts (no history of chemical  dependency; not drug-focused)       Reliability = 2 pts (occasional difficulties with compliance; generally reliable)       Social = 2 pts (reduction in some relationships/life rolls)       (Psych + Chem hlth + Reliability + Social) = 16    Efficacy = 2 pts (moderate benefit/function; low med dose; too early/not tried meds)    DIRE Score = 18        7-13: likely NOT suitable candidate for long-term opioid analgesia       14-21: may be a suitable candidate for long-term opioid analgesia     Previous Diagnostic Tests:   Imaging Studies:   MR LUMBAR SPINE W/O CONTRAST 6/27/2019  Impression:   1. Multilevel lumbar spondylosis, most pronounced at L5-S1 with  moderate to severe left and moderate right neural foraminal stenosis as well as narrowing of the left lateral recess and abutment the traversing left S1 nerve root.   2. Additional multilevel degenerative changes of the lumbar spine as described above.     PAIN RELEVANT CONDITIONS:   1.  Postherpetic neuralgia  2.  Ankylosing spondylosis, Lumbar DDD with left S1 nerve involvement, lumbar stenosis  3.  Chronic migraine headaches  4.  History: Complex medical issues, see history    ASSESSMENT AND PLAN    (G89.29) Chronic intractable pain  (primary encounter diagnosis)  (M51.36) DDD (degenerative disc disease), lumbar  (M50.30) DDD (degenerative disc disease), cervical  (M48.02) Cervical stenosis of spinal canal  (M54.50,  G89.29) Chronic midline low back pain w/o sciatica  (M62.838) Muscle spasm  Comment: Tito is noting incomplete relief from Methocarbamol. Takes TID, his sleep schedule does not easily accommodate QID dosing. Being evaluated for cutaneous nerve concerns r/t REM sleep disorder. Biopsy sites uncomfortable. Has not had steroid TPIs for cervical and lumbar spasms but from reaction to TP treatment with a chiropractic activator, he's like to try. Discussed that steroids can only be used q3 months but can be done more frequently with saline only. Order  written as well as PT order to add cervical treatment to current program.     Plan: PAIN INJECTION EVAL/TREAT/FOLLOW UP, Physical         Therapy Referral        Plan: methocarbamol (ROBAXIN) 500 MG tablet             PATIENT INSTRUCTIONS:     Diagnosis reviewed, treatment option addressed, and risk/benefits discussed.  Self-care instructions given.  I am recommending a multidisciplinary treatment plan to help this patient better manage pain.    Remember to request ALL medication refills 5-7 BUSINESS days before you run out.     1. Physical therapy: YES, Continue per therapist's recommendations. Order written to add neck PT sent to [facility].   2. 30 minute Clinic follow-up with JOCELYN Zavala NP-C as scheduled on 9/15/22 at 1 pm.   3. Procedures recommended: Cervical and Lumbar trigger point injections with steroid. Schedule with Dr Montgomery.    4. Medication Management :   1. Continue current medications.  Methocarbamol refilled.     I have reviewed the note as documented above.  This accurately captures the substance of my conversation with the patient.    BILLING TIME DOCUMENTATION:   TOTAL TIME on the date of service includes:   Time spent preparing to see the patient: 5 minutes (reviewing records and tests)  Time spend face to face with the patient: 24 minutes  Time spent ordering tests, medications, procedures and referrals: 0 minutes  Time spent Referring and communicating with other healthcare professionals: 0 minutes  Documenting clinical information in Epic: 10 minutes    The total TIME spent on this patient on the day of the appointment was 39 minutes.     JOCELYN Padgett, NP-C  [facility]    (Information in italics and blue color are taken from previous pain and consulting medical providers notes and are documented as such)

## 2022-08-14 ENCOUNTER — HEALTH MAINTENANCE LETTER (OUTPATIENT)
Age: 47
End: 2022-08-14

## 2022-08-15 ENCOUNTER — TRANSFERRED RECORDS (OUTPATIENT)
Dept: HEALTH INFORMATION MANAGEMENT | Facility: CLINIC | Age: 47
End: 2022-08-15

## 2022-08-18 ENCOUNTER — MYC REFILL (OUTPATIENT)
Dept: FAMILY MEDICINE | Facility: CLINIC | Age: 47
End: 2022-08-18

## 2022-08-18 DIAGNOSIS — M51.369 DDD (DEGENERATIVE DISC DISEASE), LUMBAR: ICD-10-CM

## 2022-08-18 DIAGNOSIS — M45.8 ANKYLOSING SPONDYLITIS OF SACRAL REGION (H): ICD-10-CM

## 2022-08-18 RX ORDER — OXYCODONE HYDROCHLORIDE 5 MG/1
5 TABLET ORAL EVERY 6 HOURS PRN
Qty: 35 TABLET | Refills: 0 | OUTPATIENT
Start: 2022-08-18

## 2022-08-19 ENCOUNTER — MYC MEDICAL ADVICE (OUTPATIENT)
Dept: FAMILY MEDICINE | Facility: CLINIC | Age: 47
End: 2022-08-19

## 2022-08-19 DIAGNOSIS — M51.369 DDD (DEGENERATIVE DISC DISEASE), LUMBAR: ICD-10-CM

## 2022-08-19 DIAGNOSIS — M45.8 ANKYLOSING SPONDYLITIS OF SACRAL REGION (H): ICD-10-CM

## 2022-08-19 RX ORDER — OXYCODONE HYDROCHLORIDE 5 MG/1
5 TABLET ORAL EVERY 6 HOURS PRN
Qty: 35 TABLET | Refills: 0 | Status: SHIPPED | OUTPATIENT
Start: 2022-08-19 | End: 2022-09-09

## 2022-08-19 NOTE — TELEPHONE ENCOUNTER
Refill denied to the pharmacy.   Needs pain follow up and cannot wait until Dec - his CSA has .  Virtual would work. Our policy is every 3 months.

## 2022-08-24 PROBLEM — N18.30 STAGE 3 CHRONIC KIDNEY DISEASE, UNSPECIFIED WHETHER STAGE 3A OR 3B CKD (H): Status: RESOLVED | Noted: 2022-08-06 | Resolved: 2022-08-24

## 2022-08-25 ENCOUNTER — VIRTUAL VISIT (OUTPATIENT)
Dept: FAMILY MEDICINE | Facility: CLINIC | Age: 47
End: 2022-08-25
Payer: MEDICARE

## 2022-08-25 DIAGNOSIS — G47.33 OSA (OBSTRUCTIVE SLEEP APNEA): Primary | ICD-10-CM

## 2022-08-25 DIAGNOSIS — F33.2 SEVERE EPISODE OF RECURRENT MAJOR DEPRESSIVE DISORDER, WITHOUT PSYCHOTIC FEATURES (H): ICD-10-CM

## 2022-08-25 DIAGNOSIS — G43.109 MIGRAINE WITH AURA AND WITHOUT STATUS MIGRAINOSUS, NOT INTRACTABLE: Primary | ICD-10-CM

## 2022-08-25 PROCEDURE — 99213 OFFICE O/P EST LOW 20 MIN: CPT | Mod: 95 | Performed by: NURSE PRACTITIONER

## 2022-08-25 RX ORDER — RIZATRIPTAN BENZOATE 10 MG/1
10 TABLET ORAL
Qty: 30 TABLET | Refills: 1 | Status: SHIPPED | OUTPATIENT
Start: 2022-08-25 | End: 2023-04-26

## 2022-08-25 ASSESSMENT — ANXIETY QUESTIONNAIRES
2. NOT BEING ABLE TO STOP OR CONTROL WORRYING: SEVERAL DAYS
8. IF YOU CHECKED OFF ANY PROBLEMS, HOW DIFFICULT HAVE THESE MADE IT FOR YOU TO DO YOUR WORK, TAKE CARE OF THINGS AT HOME, OR GET ALONG WITH OTHER PEOPLE?: VERY DIFFICULT
GAD7 TOTAL SCORE: 6
5. BEING SO RESTLESS THAT IT IS HARD TO SIT STILL: NOT AT ALL
6. BECOMING EASILY ANNOYED OR IRRITABLE: SEVERAL DAYS
3. WORRYING TOO MUCH ABOUT DIFFERENT THINGS: SEVERAL DAYS
IF YOU CHECKED OFF ANY PROBLEMS ON THIS QUESTIONNAIRE, HOW DIFFICULT HAVE THESE PROBLEMS MADE IT FOR YOU TO DO YOUR WORK, TAKE CARE OF THINGS AT HOME, OR GET ALONG WITH OTHER PEOPLE: VERY DIFFICULT
7. FEELING AFRAID AS IF SOMETHING AWFUL MIGHT HAPPEN: SEVERAL DAYS
7. FEELING AFRAID AS IF SOMETHING AWFUL MIGHT HAPPEN: SEVERAL DAYS
GAD7 TOTAL SCORE: 6
GAD7 TOTAL SCORE: 6
4. TROUBLE RELAXING: SEVERAL DAYS
1. FEELING NERVOUS, ANXIOUS, OR ON EDGE: SEVERAL DAYS

## 2022-08-25 ASSESSMENT — COLUMBIA-SUICIDE SEVERITY RATING SCALE - C-SSRS
1. WITHIN THE PAST MONTH, HAVE YOU WISHED YOU WERE DEAD OR WISHED YOU COULD GO TO SLEEP AND NOT WAKE UP?: YES
6. HAVE YOU EVER DONE ANYTHING, STARTED TO DO ANYTHING, OR PREPARED TO DO ANYTHING TO END YOUR LIFE?: NO
2. IN THE PAST MONTH, HAVE YOU ACTUALLY HAD ANY THOUGHTS OF KILLING YOURSELF?: NO

## 2022-08-25 ASSESSMENT — PATIENT HEALTH QUESTIONNAIRE - PHQ9
10. IF YOU CHECKED OFF ANY PROBLEMS, HOW DIFFICULT HAVE THESE PROBLEMS MADE IT FOR YOU TO DO YOUR WORK, TAKE CARE OF THINGS AT HOME, OR GET ALONG WITH OTHER PEOPLE: SOMEWHAT DIFFICULT
SUM OF ALL RESPONSES TO PHQ QUESTIONS 1-9: 6
SUM OF ALL RESPONSES TO PHQ QUESTIONS 1-9: 6
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
## 2022-08-25 NOTE — PATIENT INSTRUCTIONS
The following ways the patient can remain in a safe environment:  remove alcohol, remove drugs, dispose of old medications  and be around others    [place] suicide prevention number:  263-437-HHRL (8255)    [place] of Mental [facility]: [phone]      National Suicide Prevention Lifeline.  The 988 code is more than just an easy-to-remember number - it s a direct connection to compassionate, accessible care and support for anyone experiencing mental health-related distress. People can also dial 988 if they are worried about a loved one who may need crisis support. 988 is just the first step in a larger initiative to build a robust crisis response system across the country.    How to access the Lifeline:    Call 8-622-800-7302  Call 988 - services available in English and Hebrew, interpretation services in over 150 languages  Text 988 (English only)  Chat using the Lifeline website (English only)       Key Details:    The 988 dialing code will operate through the existing Lifeline number (6-507-940-0486); the 10-digit number will not go away.  988 is confidential, free, and available 24/7/365  988 is accessible through every land line, cell phone, and voice-over internet device in the [place]  Materials in Epic referencing the 10-digit Lifeline will be updated on July 18 to include 988 information

## 2022-08-31 ENCOUNTER — MYC MEDICAL ADVICE (OUTPATIENT)
Dept: SLEEP MEDICINE | Facility: CLINIC | Age: 47
End: 2022-08-31

## 2022-08-31 DIAGNOSIS — G62.9 SMALL FIBER NEUROPATHY: Primary | ICD-10-CM

## 2022-09-02 ASSESSMENT — ENCOUNTER SYMPTOMS
BLOATING: 1
EYE PAIN: 1
DEPRESSION: 1
ARTHRALGIAS: 1
NECK PAIN: 1
ABDOMINAL PAIN: 1
DECREASED CONCENTRATION: 1
BACK PAIN: 1
MYALGIAS: 1
SINUS PAIN: 1
DIARRHEA: 1
RECTAL PAIN: 1
SINUS CONGESTION: 1
NERVOUS/ANXIOUS: 1
NAUSEA: 1
EYE IRRITATION: 1

## 2022-09-02 ASSESSMENT — PAIN SCALES - PAIN ENJOYMENT GENERAL ACTIVITY SCALE (PEG)
PEG_TOTALSCORE: 5.67
INTERFERED_ENJOYMENT_LIFE: 6
INTERFERED_GENERAL_ACTIVITY: 6
AVG_PAIN_PASTWEEK: 5

## 2022-09-02 NOTE — TELEPHONE ENCOUNTER
Called and discussed results with Tito.     Normal biopsy results in cervical and distal thigh but decreased epidermal nerve fiber density and + alpha synuclein stain in distal leg (over ankle).     I suspect this may explain much of the patients larger syndrome (has been diagnosed with POTS).  I wonder about MSA.  He is scheduled to see Dr Khan next week.  I will send results to Dr Khan (with patient permission).

## 2022-09-08 ENCOUNTER — TELEPHONE (OUTPATIENT)
Dept: PALLIATIVE MEDICINE | Facility: CLINIC | Age: 47
End: 2022-09-08

## 2022-09-08 ENCOUNTER — OFFICE VISIT (OUTPATIENT)
Dept: PALLIATIVE MEDICINE | Facility: CLINIC | Age: 47
End: 2022-09-08
Attending: NURSE PRACTITIONER
Payer: MEDICARE

## 2022-09-08 VITALS — SYSTOLIC BLOOD PRESSURE: 128 MMHG | HEART RATE: 72 BPM | DIASTOLIC BLOOD PRESSURE: 73 MMHG

## 2022-09-08 DIAGNOSIS — M62.838 MUSCLE SPASM: ICD-10-CM

## 2022-09-08 DIAGNOSIS — M79.18 MYOFASCIAL MUSCLE PAIN: Primary | ICD-10-CM

## 2022-09-08 PROCEDURE — 20553 NJX 1/MLT TRIGGER POINTS 3/>: CPT | Performed by: PAIN MEDICINE

## 2022-09-08 RX ADMIN — TRIAMCINOLONE ACETONIDE 40 MG: 40 INJECTION, SUSPENSION INTRA-ARTICULAR; INTRAMUSCULAR at 12:11

## 2022-09-08 RX ADMIN — BUPIVACAINE HYDROCHLORIDE 50 MG: 5 INJECTION, SOLUTION EPIDURAL; INTRACAUDAL at 12:11

## 2022-09-08 ASSESSMENT — PAIN SCALES - GENERAL: PAINLEVEL: MODERATE PAIN (4)

## 2022-09-08 NOTE — PATIENT INSTRUCTIONS
[facility]  Post Procedure Instructions    Today you had:  trigger point injections   occipital nerve block   bursa injection  Joint Injection    Medications used:  lidocaine   bupivicaine   kenolog   dexamethasone        Go to the emergency room if you develop any shortness of breath  Monitor the injection sites for signs and symptoms of infection-fever, chills, redness, swelling, warmth, or drainage to areas.  You may have soreness at injection sites for up to 24 hours.  It may take up to 14 days for the steroid medication to start working although you may feel the effect as early as a few days after the procedure.     You may apply ice to the painful areas to help minimize the discomfort of the needle pokes.  Do not apply heat to sites for at least 12 hours.  You may use anti-inflammatory medications or Tylenol for pain control if necessary    Pain Clinic phone number during work hours (Monday through Friday 8 am-4:30 pm) at [phone] or the Provider Line after hours at [phone]:

## 2022-09-09 ENCOUNTER — MYC REFILL (OUTPATIENT)
Dept: FAMILY MEDICINE | Facility: CLINIC | Age: 47
End: 2022-09-09

## 2022-09-09 DIAGNOSIS — M51.369 DDD (DEGENERATIVE DISC DISEASE), LUMBAR: ICD-10-CM

## 2022-09-09 DIAGNOSIS — M45.8 ANKYLOSING SPONDYLITIS OF SACRAL REGION (H): ICD-10-CM

## 2022-09-09 RX ORDER — OXYCODONE HYDROCHLORIDE 5 MG/1
5 TABLET ORAL EVERY 6 HOURS PRN
Qty: 35 TABLET | Refills: 0 | Status: SHIPPED | OUTPATIENT
Start: 2022-09-09 | End: 2022-09-30

## 2022-09-09 NOTE — TELEPHONE ENCOUNTER
"Patient called to report \"intense pain on the top of head\" following ONB and TPIs today. Wanted to be sure this is normal. Assured him that it is and recommended ice packs, and OTC NSAIDs and or Tylenol.     JOCELYN Padgett, NP-C  [facility]        "

## 2022-09-12 PROBLEM — G62.9 SMALL FIBER NEUROPATHY: Status: ACTIVE | Noted: 2022-09-12

## 2022-09-13 RX ORDER — TRIAMCINOLONE ACETONIDE 40 MG/ML
40 INJECTION, SUSPENSION INTRA-ARTICULAR; INTRAMUSCULAR ONCE
Status: COMPLETED | OUTPATIENT
Start: 2022-09-13 | End: 2022-09-08

## 2022-09-13 RX ORDER — BUPIVACAINE HYDROCHLORIDE 5 MG/ML
10 INJECTION, SOLUTION EPIDURAL; INTRACAUDAL ONCE
Status: COMPLETED | OUTPATIENT
Start: 2022-09-13 | End: 2022-09-08

## 2022-09-14 ASSESSMENT — PAIN SCALES - PAIN ENJOYMENT GENERAL ACTIVITY SCALE (PEG)
INTERFERED_GENERAL_ACTIVITY: 4
INTERFERED_ENJOYMENT_LIFE: 4
AVG_PAIN_PASTWEEK: 4
INTERFERED_GENERAL_ACTIVITY: 4
PEG_TOTALSCORE: 4
AVG_PAIN_PASTWEEK: 4
PEG_TOTALSCORE: 4
INTERFERED_ENJOYMENT_LIFE: 4

## 2022-09-14 ASSESSMENT — ENCOUNTER SYMPTOMS
NIGHT SWEATS: 1
NUMBNESS: 1
MUSCLE WEAKNESS: 1
NAUSEA: 1
HEADACHES: 1
NECK PAIN: 1
EYE REDNESS: 1
ARTHRALGIAS: 1
HOARSE VOICE: 1
MYALGIAS: 1
SINUS CONGESTION: 1
ABDOMINAL PAIN: 1
COUGH DISTURBING SLEEP: 1
DECREASED CONCENTRATION: 1
MEMORY LOSS: 1
FATIGUE: 1
DIZZINESS: 1
DEPRESSION: 1
HEARTBURN: 1
MUSCLE CRAMPS: 1
BLOOD IN STOOL: 1
NERVOUS/ANXIOUS: 1
BACK PAIN: 1

## 2022-09-14 ASSESSMENT — ANXIETY QUESTIONNAIRES
GAD7 TOTAL SCORE: 6
7. FEELING AFRAID AS IF SOMETHING AWFUL MIGHT HAPPEN: SEVERAL DAYS
5. BEING SO RESTLESS THAT IT IS HARD TO SIT STILL: NOT AT ALL
GAD7 TOTAL SCORE: 6
4. TROUBLE RELAXING: SEVERAL DAYS
6. BECOMING EASILY ANNOYED OR IRRITABLE: SEVERAL DAYS
7. FEELING AFRAID AS IF SOMETHING AWFUL MIGHT HAPPEN: SEVERAL DAYS
3. WORRYING TOO MUCH ABOUT DIFFERENT THINGS: SEVERAL DAYS
2. NOT BEING ABLE TO STOP OR CONTROL WORRYING: SEVERAL DAYS
1. FEELING NERVOUS, ANXIOUS, OR ON EDGE: SEVERAL DAYS
IF YOU CHECKED OFF ANY PROBLEMS ON THIS QUESTIONNAIRE, HOW DIFFICULT HAVE THESE PROBLEMS MADE IT FOR YOU TO DO YOUR WORK, TAKE CARE OF THINGS AT HOME, OR GET ALONG WITH OTHER PEOPLE: SOMEWHAT DIFFICULT
8. IF YOU CHECKED OFF ANY PROBLEMS, HOW DIFFICULT HAVE THESE MADE IT FOR YOU TO DO YOUR WORK, TAKE CARE OF THINGS AT HOME, OR GET ALONG WITH OTHER PEOPLE?: SOMEWHAT DIFFICULT

## 2022-09-15 ENCOUNTER — OFFICE VISIT (OUTPATIENT)
Dept: PALLIATIVE MEDICINE | Facility: CLINIC | Age: 47
End: 2022-09-15
Payer: MEDICARE

## 2022-09-15 VITALS — DIASTOLIC BLOOD PRESSURE: 85 MMHG | HEART RATE: 69 BPM | SYSTOLIC BLOOD PRESSURE: 125 MMHG

## 2022-09-15 DIAGNOSIS — G89.29 CHRONIC INTRACTABLE PAIN: Primary | ICD-10-CM

## 2022-09-15 PROCEDURE — 99213 OFFICE O/P EST LOW 20 MIN: CPT | Performed by: NURSE PRACTITIONER

## 2022-09-15 RX ORDER — PREDNISONE 20 MG/1
20 TABLET ORAL DAILY
COMMUNITY
End: 2022-10-18

## 2022-09-15 ASSESSMENT — PAIN SCALES - GENERAL: PAINLEVEL: MILD PAIN (2)

## 2022-09-15 NOTE — PATIENT INSTRUCTIONS
After Visit Instructions:     Thank you for coming to [facility] for your care. It is my goal to partner with you to help you reach your optimal state of health.   Continue daily self-care, identifying contributing factors, and monitoring variations in pain level. Continue to integrate self-care into your life.      Physical therapy: YES continue per your physical therapist.   Look into yoga for back pain, seated yoga, gentle yoga, Yoga with Samara.   30 minutes Video or Clinic follow-up with JOCELYN Zavala NP-C in 2 months or sooner as needed. .   Medication Management : Ask Dr Baxter about daily prednisone.       JOCELYN Padgett, NP-C  [facility]  [facility] - Monday and Friday  [facility] - Tuesday  [facility] - Thursday    Be sure to request ALL medication refills 5 days prior to the due date unless you will see your medical provider in an appointment before the due date.  This is YOUR responsibility. Do not expect same day refills. If you do not plan ahead you may run out of medications.   NO early refills are allowed. It is your responsibility to manage your medications responsibility and keep them safely stored. Lost or destroyed medications WILL NOT be replaced    Scheduling/Clinic telephone Number for ALL locations:  [phone]    After Hours On-Call Service:  [phone]    Call with any questions about your care and for scheduling assistance.   Calls are returned Monday through Friday between 8 AM and 4:00 PM. We usually get back to you within 2 business days depending on the issue/request.    If we are prescribing your medications:  For opioid medication refills, call the clinic or send a Wildcard message 7 days in advance.  Please include:  Your name and date of birth.   Name of requested medication  Name of the pharmacy.  For non-opioid medications, call your pharmacy directly to request a refill. Please allow 3-4 days to be  processed.   Per [place] State Law:  All controlled substance prescriptions must be filled within 30 days of being written.    For those controlled substances allowing refills, pickup must occur within 30 days of last fill.      We believe regular attendance is key to your success in our program!    Any time you are unable to keep your appointment we ask that you call us at least 24 hours in advance to cancel.This will allow us to offer the appointment time to another patient.   Multiple missed appointments may lead to dismissal from the clinic.

## 2022-09-15 NOTE — PROGRESS NOTES
[facility]    CHIEF COMPLAINT: Chronic Pain.    INTERVAL HISTORY:  Last seen on 8/11/22.       Recommendations/plan at the last visit included:  1. Physical therapy: YES, Continue per therapist's recommendations. Order written to add neck PT sent to [facility].   2. 30 minute Clinic follow-up with JOCELYN Zavala NP-C as scheduled on 9/15/22 at 1 pm.   3. Procedures recommended: Cervical and Lumbar trigger point injections with steroid. Schedule with Dr Montgomery.    4. Medication Management :   1. Continue current medications.  Methocarbamol refilled.       Since last visit:   - 9/8/22: TPIs, ONB, bursa injection, joint injection with Valentina. Intensity and frequency of headaches are improved. All pain areas are improvement.   - Had allergic reaction to flu vaccine. Was put on high dose prednisone steroid burst and notes excellent overall pain relief    Pain Information today: Mild Pain (2)/10. Location of pain: multifocal pain.    Annual Requirements last collected:  N/A     Current Pain Relevant Medications:    Acetaminophen 500 mg BID & with Oxycodone.   Cymbalta 120 mg in AM  Lyrica 150 mg BID  Methocarbamol 500 mg 1-2 QID PRN  Nabumetone 500 mg 1-2 times a day  Lidocaine gel topically 2-3 times daily     Pain Related Controlled Substances:   Clonazepam 0.5 mg at HS. Occasionally 1 tab during the day.  Lunesta 3 mg  Oxycodone 5 mg 1-2 tabs per day PRN              Total opiate dose: 7.5-15 MME     Previous Pain Relevant Medications: (H--helped; HI--Helped initially; [facility]--Somewhat helpful; NH--No help; W--worse; SE--side effects; ?--Unsure if helpful)   NOTE: This medication information taken from patient's intake form, not medical records.               Opiates: Oxycodone: H, Tramadol:[facility]. SE, Codeine: [place]              NSAIDS: Celebrex: [facility], Nabumetone:H, Naproxen: SE              Anti-migraine medications: Midrin? , Maxalt:H              Muscle Relaxants: Baclofen:[facility],  Flexeril:H, Tizaidine:?, Methocarbamol:?              Neuropathics: Lyrica:H  Anti-depressants: Abilify:SE, Brintellix: NH, Wellbutrin: ?, Cymbalta: [place], Nortriptyline: [place], Seroquel:   [facility], Risperdal: [place], Effexor:?, Cymbalta ?              Anxiety medications: Xanax: H, Klonpin: H, lorazepam: H                  Topicals: Lidocaine:H              Sleep medications:Belsomra: NH, Melatonin:H, Trazodone NH, Ambien:[facility]              Other medications not covered above: Humira: All, Enbrel; H, dicyclomine:H, Medrol:[facility], Prednisone:H  This will be added at a later date when new patient packet is available.      Any illicit drug use: denies   EtOH use: none   Caffeine use: 6-8 per day   Nicotine use: None     Past Pain Treatments:               Pain Clinic:   Yes    [facility] pain management: For spinal injections with Dr Diaz, [facility]: injections, nerve ablation, [facility] for SCS treatment.  Did trial but did not find it beneficial.   [facility] chronic pain program.                   PT: Yes  For other reasons. Neck, back and feet              Psychologist: Yes ongoing with private therapist.at  [facility].               Chiropractor: Yes years ago              Acupuncture: Yes [facility]              Pharmacotherapy:                           Opioids: Yes                            Non-opioids:    Yes               TENs Unit:Yes H for back               Injections: Yes Many for neck and back               Surgeries related to pain: Yes               October 2007 L5-S1 laminectomy, micro discectomy          THE 4 As OF OPIOID MAINTENANCE ANALGESIA    Analgesia: Is pain relief clinically significant? YES   Activity: Is patient functional and able to perform Activities of Daily Living? YES   Adverse effects: Is patient free from adverse side effects from opiates? YES   Adherence to Rx protocol: Is patient adhering to Controlled Substance Agreement and taking medications ONLY as ordered? YES     Is Narcan prescribed for opiate use >50  MME daily or concurrent use of opiates and benzodiazepines?  Yes prescribed by this writer 8/10/2020    [place] [facility] Data Base Reviewed:    YES; No concern for abuse or misuse of controlled medications based on this report. Reviewed [facility] September 14, 2022- no concerning fills.      PHYSICAL EXAM    Vitals:    09/15/22 1300   BP: 125/85   Pulse: 69       Constitutional: healthy, alert and no distress A&O.   Patient is appropriate.  Psychiatric/mental status: Alert, without lethargy or stupor. Appropriate affect.    Neurologic exam:  CN:  Cranial nerves 2-12 are grossly normal.      DIRE Score for ongoing opioid management is calculated as follows:    Diagnosis = 3 pts (advanced condition; severe pain/objective findings)    Intractability = 3 pts (patient fully engaged but inadequate response to treatments)    Risk        Psych = 3 pts (no significant personality dysfunction/mental illness; good communication with clinic)         Chem Hlth = 3 pts (no history of chemical dependency; not drug-focused)       Reliability = 2 pts (occasional difficulties with compliance; generally reliable)       Social = 2 pts (reduction in some relationships/life rolls)       (Psych + Chem hlth + Reliability + Social) = 16    Efficacy = 2 pts (moderate benefit/function; low med dose; too early/not tried meds)    DIRE Score = 18        7-13: likely NOT suitable candidate for long-term opioid analgesia       14-21: may be a suitable candidate for long-term opioid analgesia     Previous Diagnostic Tests:   Imaging Studies:   MR LUMBAR SPINE W/O CONTRAST 6/27/2019  Impression:   1. Multilevel lumbar spondylosis, most pronounced at L5-S1 with  moderate to severe left and moderate right neural foraminal stenosis as well as narrowing of the left lateral recess and abutment the traversing left S1 nerve root.   2. Additional multilevel degenerative changes of the lumbar spine as described above.     PAIN RELEVANT  CONDITIONS:   1.  Postherpetic neuralgia  2.  Ankylosing spondylosis, Lumbar DDD with left S1 nerve involvement, lumbar stenosis  3.  Chronic migraine headaches  4.  History: Complex medical issues, see history    DIAGNOSIS AND PLAN:     (G89.29) Chronic intractable pain  (primary encounter diagnosis)  Comment: Continue current medications and plan of care. Asked about daily prednisone for chronic pain. Discussed negative side effectsof prednisone and that a usual daily dose of prednisone for rheumatologic conditions is much less than he is taking now. Advised that he discussed with Dr Baxter in rheumatology.   Plan: As noted.       PATIENT INSTRUCTIONS:     Diagnosis reviewed, treatment option addressed, and risk/benefits discussed.  Self-care instructions given.  I am recommending a multidisciplinary treatment plan to help this patient better manage pain.    Remember to request ALL medication refills 5 BUSINESS days before you run out.     1. Physical therapy: YES continue per your physical therapist.   1. Look into yoga for back pain, seated yoga, gentle yoga, Yoga with Samara.   2. 30 minutes Video or Clinic follow-up with JOCELYN Zavala NP-C in 2 months or sooner as needed. .   3. Medication Management : Ask Dr Baxter about daily prednisone.     I have reviewed the note as documented above.  This accurately captures the substance of my conversation with the patient.  A total of 31 minutes of preparation, care, and consultation were spent on this visit today.     JOCELYN Padgett NP-C  [facility]    (Information in italics and blue color are taken from previous pain and consulting medical providers notes and are documented as such)                                        Answers for HPI/ROS submitted by the patient on 9/14/2022  YUDI 7 TOTAL SCORE: 6  General Symptoms: Yes  Skin Symptoms: No  HENT Symptoms: Yes  EYE SYMPTOMS: Yes  HEART SYMPTOMS: No  LUNG SYMPTOMS:  Yes  INTESTINAL SYMPTOMS: Yes  URINARY SYMPTOMS: No  REPRODUCTIVE SYMPTOMS: Yes  SKELETAL SYMPTOMS: Yes  BLOOD SYMPTOMS: No  NERVOUS SYSTEM SYMPTOMS: Yes  MENTAL HEALTH SYMPTOMS: Yes  Tinnitus: Yes  Congestion: Yes   Voice hoarseness: Yes  Fatigue: Yes  Night sweats: Yes  Dry eyes: Yes  Redness: Yes  Nighttime Cough: Yes  Heart burn or indigestion: Yes  Nausea: Yes  Abdominal pain: Yes  Blood in stool: Yes  Erectile dysfunction: Yes  Reduced libido: Yes  Back pain: Yes  Muscle aches: Yes  Neck pain: Yes  Joint pain: Yes  Muscle cramps: Yes  Muscle weakness: Yes  Dizziness or trouble with balance: Yes  Memory loss: Yes  Headache: Yes  Numbness: Yes  Nervous or Anxious: Yes  Depression: Yes  Trouble thinking or concentrating: Yes

## 2022-09-19 ENCOUNTER — MYC MEDICAL ADVICE (OUTPATIENT)
Dept: FAMILY MEDICINE | Facility: CLINIC | Age: 47
End: 2022-09-19

## 2022-09-19 ENCOUNTER — VIRTUAL VISIT (OUTPATIENT)
Dept: FAMILY MEDICINE | Facility: CLINIC | Age: 47
End: 2022-09-19
Payer: MEDICARE

## 2022-09-19 DIAGNOSIS — M45.8 ANKYLOSING SPONDYLITIS OF SACRAL REGION (H): Primary | ICD-10-CM

## 2022-09-19 DIAGNOSIS — M51.369 DDD (DEGENERATIVE DISC DISEASE), LUMBAR: ICD-10-CM

## 2022-09-19 DIAGNOSIS — E66.01 MORBID OBESITY (H): ICD-10-CM

## 2022-09-19 DIAGNOSIS — G89.4 CHRONIC PAIN SYNDROME: ICD-10-CM

## 2022-09-19 DIAGNOSIS — E11.8 TYPE 2 DIABETES MELLITUS WITH COMPLICATION, WITHOUT LONG-TERM CURRENT USE OF INSULIN (H): ICD-10-CM

## 2022-09-19 PROCEDURE — 99214 OFFICE O/P EST MOD 30 MIN: CPT | Mod: 95 | Performed by: FAMILY MEDICINE

## 2022-09-19 RX ORDER — SEMAGLUTIDE 1.34 MG/ML
INJECTION, SOLUTION SUBCUTANEOUS
Qty: 4.5 ML | Refills: 0 | Status: SHIPPED | OUTPATIENT
Start: 2022-09-19 | End: 2022-11-11

## 2022-09-19 NOTE — LETTER
Opioid / Opioid Plus Controlled Substance Agreement    This is an agreement between you and your provider about the safe and appropriate use of controlled substance/opioids prescribed by your care team. Controlled substances are medicines that can cause physical and mental dependence (abuse).    There are strict laws about having and using these medicines. We here at [facility] are committing to working with you in your efforts to get better. To support you in this work, we ll help you schedule regular office appointments for medicine refills. If we must cancel or change your appointment for any reason, we ll make sure you have enough medicine to last until your next appointment.     As a Provider, I will:    Listen carefully to your concerns and treat you with respect.     Recommend a treatment plan that I believe is in your best interest. This plan may involve therapies other than opioid pain medication.     Talk with you often about the possible benefits, and the risk of harm of any medicine that we prescribe for you.     Provide a plan on how to taper (discontinue or go off) using this medicine if the decision is made to stop its use.    As a Patient, I understand that opioid(s):     Are a controlled substance prescribed by my care team to help me function or work and manage my condition(s).     Are strong medicines and can cause serious side effects such as:    Drowsiness, which can seriously affect my driving ability    A lower breathing rate, enough to cause death    Harm to my thinking ability     Depression     Abuse of and addiction to this medicine    Need to be taken exactly as prescribed. Combining opioids with certain medicines or chemicals (such as illegal drugs, sedatives, sleeping pills, and benzodiazepines) can be dangerous or even fatal. If I stop opioids suddenly, I may have severe withdrawal symptoms.    Do not work for all types of pain nor for all patients. If they re not helpful, I may  be asked to stop them.      The risks, benefits and side effects of these medicine(s) were explained to me. I agree that:  1. I will take part in other treatments as advised by my care team. This may be psychiatry or counseling, physical therapy, behavioral therapy, group treatment or a referral to a specialist.     2. I will keep all my appointments. I understand that this is part of the monitoring of opioids. My care team may require an office visit for EVERY opioid/controlled substance refill. If I miss appointments or don t follow instructions, my care team may stop my medicine.    3. I will take my medicines as prescribed. I will not change the dose or schedule unless my care team tells me to. There will be no refills if I run out early.     4. I may be asked to come to the clinic and complete a urine drug test or complete a pill count at any time. If I don t give a urine sample or participate in a pill count, the care team may stop my medicine.    5. I will only receive prescriptions from this clinic for chronic pain. If I am treated by another provider for acute pain issues, I will tell them that I am taking opioid pain medication for chronic pain and that I have a treatment agreement with this provider. I will inform my [facility] care team within one business day if I am given a prescription for any pain medication by another healthcare provider. My [facility] care team can contact other providers and pharmacists about my use of any medicines.    6. It is up to me to make sure that I don t run out of my medicines on weekends or holidays. If my care team is willing to refill my opioid prescription without a visit, I must request refills only during office hours. Refills may take up to 3 business days to process. I will use one pharmacy to fill all my opioid and other controlled substance prescriptions. I will notify the clinic about any changes to my insurance or medication  availability.    7. I am responsible for my prescriptions. If the medicine/prescription is lost, stolen or destroyed, it will not be replaced. I also agree not to share controlled substance medicines with anyone.    8. I am aware I should not use any illegal or recreational drugs. I agree not to drink alcohol unless my care team says I can.       9. If I enroll in the [place] [facility] program, I will tell my care team prior to my next refill.     10. I will tell my care team right away if I become pregnant, have a new medical problem treated outside of my regular clinic, or have a change in my medications.    11. I understand that this medicine can affect my thinking, judgment and reaction time. Alcohol and drugs affect the brain and body, which can affect the safety of my driving. Being under the influence of alcohol or drugs can affect my decision-making, behaviors, personal safety, and the safety of others. Driving while impaired (DWI) can occur if a person is driving, operating, or in physical control of a car, motorcycle, boat, snowmobile, ATV, motorbike, off-road vehicle, or any other motor vehicle ([place] Statute 169A.20). I understand the risk if I choose to drive or operate any vehicle or machinery.    I understand that if I do not follow any of the conditions above, my prescriptions or treatment may be stopped or changed.          Opioids  What You Need to Know    What are opioids?   Opioids are pain medicines that must be prescribed by a doctor. They are also known as narcotics.     Examples are:   1. morphine (MS Contin, Lindy)  2. oxycodone (Oxycontin)  3. oxycodone and acetaminophen (Percocet)  4. hydrocodone and acetaminophen (Vicodin, Norco)   5. fentanyl patch (Duragesic)   6. hydromorphone (Dilaudid)   7. methadone  8. codeine (Tylenol #3)     What do opioids do well?   Opioids are best for severe short-term pain such as after a surgery or injury. They may work well for cancer pain. They may  help some people with long-lasting (chronic) pain.     What do opioids NOT do well?   Opioids never get rid of pain entirely, and they don t work well for most patients with chronic pain. Opioids don t reduce swelling, one of the causes of pain.                                    Other ways to manage chronic pain and improve function include:       Treat the health problem that may be causing pain    Anti-inflammation medicines, which reduce swelling and tenderness, such as ibuprofen (Advil, Motrin) or naproxen (Aleve)    Acetaminophen (Tylenol)    Antidepressants and anti-seizure medicines, especially for nerve pain    Topical treatments such as patches or creams    Injections or nerve blocks    Chiropractic or osteopathic treatment    Acupuncture, massage, deep breathing, meditation, visual imagery, aromatherapy    Use heat or ice at the pain site    Physical therapy     Exercise    Stop smoking    Take part in therapy       Risks and side effects     Talk to your doctor before you start or decide to keep taking opioids. Possible side effects include:      Lowering your breathing rate enough to cause death    Overdose, including death, especially if taking higher than prescribed doses    Worse depression symptoms; less pleasure in things you usually enjoy    Feeling tired or sluggish    Slower thoughts or cloudy thinking    Being more sensitive to pain over time; pain is harder to control    Trouble sleeping or restless sleep    Changes in hormone levels (for example, less testosterone)    Changes in sex drive or ability to have sex    Constipation    Unsafe driving    Itching and sweating    Dizziness    Nausea, throwing up and dry mouth    What else should I know about opioids?    Opioids may lead to dependence, tolerance, or addiction.      Dependence means that if you stop or reduce the medicine too quickly, you will have withdrawal symptoms. These include loose poop (diarrhea), jitters, flu-like symptoms,  nervousness and tremors. Dependence is not the same as addiction.                       Tolerance means needing higher doses over time to get the same effect. This may increase the chance of serious side effects.      Addiction is when people improperly use a substance that harms their body, their mind or their relations with others. Use of opiates can cause a relapse of addiction if you have a history of drug or alcohol abuse.      People who have used opioids for a long time may have a lower quality of life, worse depression, higher levels of pain and more visits to doctors.    You can overdose on opioids. Take these steps to lower your risk of overdose:    1. Recognize the signs:  Signs of overdose include decrease or loss of consciousness (blackout), slowed breathing, trouble waking up and blue lips. If someone is worried about overdose, they should call 911.    2. Talk to your doctor about Narcan (naloxone).   If you are at risk for overdose, you may be given a prescription for Narcan. This medicine very quickly reverses the effects of opioids.   If you overdose, a friend or family member can give you Narcan while waiting for the ambulance. They need to know the signs of overdose and how to give Narcan.     3. Don't use alcohol or street drugs.   Taking them with opioids can cause death.    4. Do not take any of these medicines unless your doctor says it s OK. Taking these with opioids can cause death:    Benzodiazepines, such as lorazepam (Ativan), alprazolam (Xanax) or diazepam (Valium)    Muscle relaxers, such as cyclobenzaprine (Flexeril)    Sleeping pills like zolpidem (Ambien)     Other opioids      How to keep you and other people safe while taking opioids:    1. Never share your opioids with others.  Opioid medicines are regulated by the Drug Enforcement Agency (BAM). Selling or sharing medications is a criminal act.    2. Be sure to store opioids in a secure place, locked up if possible. Young children  can easily swallow them and overdose.    3. When you are traveling with your medicines, keep them in the original bottles. If you use a pill box, be sure you also carry a copy of your medicine list from your clinic or pharmacy.    4. Safe disposal of opioids    Most pharmacies have places to get rid of medicine, called disposal kiosks. Medicine disposal options are also available in every [facility]. Search your county and  medication disposal  to find more options. You can find more details at:  [email or web address]-[email or web address]     I agree that my provider, clinic care team, and pharmacy may work with any city, state or federal law enforcement agency that investigates the misuse, sale, or other diversion of my controlled medicine. I will allow my provider to discuss my care with, or share a copy of, this agreement with any other treating provider, pharmacy or emergency room where I receive care.    I have read this agreement and have asked questions about anything I did not understand.    _______________________________________________________  Patient Signature - Joel Pineda _____________________                   Date     _______________________________________________________  Provider Signature - Ksenia Lyles MD   _____________________                   Date     _______________________________________________________  Witness Signature (required if provider not present while patient signing)   _____________________                   Date

## 2022-09-19 NOTE — PROGRESS NOTES
Tito is a 47 year old who is being evaluated via a billable video visit.      How would you like to obtain your AVS? MyChart  If the video visit is dropped, the invitation should be resent by: Text to cell phone: [phone]  Will anyone else be joining your video visit? No          Assessment & Plan     Ankylosing spondylitis of sacral region (H)  Working with Dr. Baxter on this.  Recently was on prednisone for an allergic reaction and symptoms of gotten a lot better so he is going to talk to Dr. Baxter about the possibility of some low-dose prednisone for a bit.  I discussed with patient that my concern on this would be sugar levels.  These have been under very good control with lifestyle management and we would want to follow things closely.  But I certainly think it would be a reasonable option to consider and I will leave that up to Dr. Baxter.    DDD (degenerative disc disease), lumbar  As above    Chronic pain syndrome  We will send a new controlled substance agreement to sign and send back and urine drug screen with next lab work.  - Drug Abuse Screen Panel 13, Urine (Pain Care Package) - lab collect; Future    Morbid obesity (H)  Longstanding issue and patient asked about some of the newer diabetes agents that can help with some weight loss.  So I discussed how these medications work and what potential issues they are.  Primarily insurance can be a barrier but we will look into that.    Type 2 diabetes mellitus with complication, without long-term current use of insulin (H)  If covered we will start on Ozempic once weekly  - Hemoglobin A1c; Future  - Albumin Random Urine Quantitative with Creat Ratio; Future  - semaglutide (OZEMPIC, 0.25 OR 0.5 MG/DOSE,) 2 MG/1.5ML SOPN pen; Inject 0.25 mg Subcutaneous every 7 days for 28 days, THEN 0.5 mg every 7 days.    Reviewed interval history with patient and in his chart     BMI:   Estimated body mass index is 36.4 kg/m  as calculated from the following:     "Height as of 8/10/22: 1.981 m (6' 6\").    Weight as of 8/10/22: 142.9 kg (315 lb).   Weight management plan: Discussed healthy diet and exercise guidelines    See Patient Instructions    Return in about 3 months (around 12/19/2022) for Follow up on Pain, In Office or Video, can call for refills.    Ksenia Lyles MD  [facility]    Radha Francis is a 47 year old, presenting for the following health issues:  No chief complaint on file.      HPI     Video visit with patient today primarily for routine follow-up of chronic back pain.  But he wanted to talk about weight and diabetes as well.    Review of Systems   Constitutional, HEENT, cardiovascular, pulmonary, gi and gu systems are negative, except as otherwise noted.      Objective           Vitals:  No vitals were obtained today due to virtual visit.    Physical Exam   GENERAL: Healthy, alert and no distress  EYES: Eyes grossly normal to inspection.  No discharge or erythema, or obvious scleral/conjunctival abnormalities.  RESP: No audible wheeze, cough, or visible cyanosis.  No visible retractions or increased work of breathing.    SKIN: Visible skin clear. No significant rash, abnormal pigmentation or lesions.  NEURO: Cranial nerves grossly intact.  Mentation and speech appropriate for age.  PSYCH: Mentation appears normal, affect normal/bright, judgement and insight intact, normal speech and appearance well-groomed.    Past labs reviewed with the patient.             Video-Visit Details    Video Start Time: 0918    Type of service:  Video Visit    Video End Time:0925    Originating Location (pt. Location): Home    Distant Location (provider location):  [facility]     Platform used for Video Visit: [employer]     Answers for HPI/ROS submitted by the patient on 9/16/2022  Frequency of checking blood sugars:: a few times a week  What time of day are you checking your blood sugars : before meals  Have you had " any blood sugars above 200?: Yes  Have you had any blood sugars below 70?: No  Hypoglycemia symptoms:: weakness, other  Diabetic concerns:: blood sugar frequently over 200  Paraesthesia present:: numbness in feet, burning in feet  Since last visit, patient describes the following symptoms:: Anxiety, Depression, Fatigue  Your back pain is: chronic  Where is your back pain located? : right lower back, left lower back, right side of neck, left side of neck, right shoulder, left shoulder, right side of waist, left side of waist  How would you describe your back pain? : cramping, dull ache, gnawing, sharp  Where does your back pain spread? : nowhere  Since you noticed your back pain, how has it changed? : always present, but gets better and worse  Does your back pain interfere with your job?: Not applicable  If yes, which:: steroid injection  What is the reason for your visit today? : Controlled Substance Agreement  How many servings of fruits and vegetables do you eat daily?: 2-3  On average, how many sweetened beverages do you drink each day (Examples: soda, juice, sweet tea, etc.  Do NOT count diet or artificially sweetened beverages)?: 2  How many minutes a day do you exercise enough to make your heart beat faster?: 9 or less  How many days a week do you exercise enough to make your heart beat faster?: 3 or less  How many days per week do you miss taking your medication?: 0

## 2022-09-23 ENCOUNTER — LAB (OUTPATIENT)
Dept: LAB | Facility: CLINIC | Age: 47
End: 2022-09-23
Payer: MEDICARE

## 2022-09-23 ENCOUNTER — IMMUNIZATION (OUTPATIENT)
Dept: NURSING | Facility: CLINIC | Age: 47
End: 2022-09-23
Payer: MEDICARE

## 2022-09-23 DIAGNOSIS — E11.8 TYPE 2 DIABETES MELLITUS WITH COMPLICATION, WITHOUT LONG-TERM CURRENT USE OF INSULIN (H): ICD-10-CM

## 2022-09-23 DIAGNOSIS — G89.4 CHRONIC PAIN SYNDROME: ICD-10-CM

## 2022-09-23 LAB
AMPHETAMINES UR QL: NOT DETECTED
BARBITURATES UR QL SCN: NOT DETECTED
BENZODIAZ UR QL SCN: NOT DETECTED
BUPRENORPHINE UR QL: NOT DETECTED
CANNABINOIDS UR QL: DETECTED
COCAINE UR QL SCN: NOT DETECTED
CREAT UR-MCNC: 34 MG/DL
D-METHAMPHET UR QL: NOT DETECTED
HBA1C MFR BLD: 6.4 % (ref 0–5.6)
METHADONE UR QL SCN: NOT DETECTED
MICROALBUMIN UR-MCNC: 9 MG/L
MICROALBUMIN/CREAT UR: 26.47 MG/G CR (ref 0–17)
OPIATES UR QL SCN: NOT DETECTED
OXYCODONE UR QL SCN: NOT DETECTED
PCP UR QL SCN: NOT DETECTED
PROPOXYPH UR QL: NOT DETECTED
TRICYCLICS UR QL SCN: NOT DETECTED

## 2022-09-23 PROCEDURE — 80306 DRUG TEST PRSMV INSTRMNT: CPT

## 2022-09-23 PROCEDURE — 83036 HEMOGLOBIN GLYCOSYLATED A1C: CPT

## 2022-09-23 PROCEDURE — 82043 UR ALBUMIN QUANTITATIVE: CPT

## 2022-09-23 PROCEDURE — 0124A COVID-19,PF,PFIZER BOOSTER BIVALENT: CPT

## 2022-09-23 PROCEDURE — 36415 COLL VENOUS BLD VENIPUNCTURE: CPT

## 2022-09-23 PROCEDURE — 91312 COVID-19,PF,PFIZER BOOSTER BIVALENT: CPT

## 2022-09-23 NOTE — RESULT ENCOUNTER NOTE
Hi Tito,   Your A1C is 6.4. it is stable. Other labs are pending.  Thank you,  JOCELYN Pro, NP-C  [facility]

## 2022-09-26 NOTE — TELEPHONE ENCOUNTER
Message from [employer]:  Original authorizing provider: MD SANTIAGO Dickey SONIALopez Pineda would like a refill of the following medications:  levothyroxine (SYNTHROID/LEVOTHROID) 75 MCG tablet [Ksenia Lyles MD]    Preferred pharmacy: [facility] [address] [address]    Comment:  Please transfer the metoprolol request to Dr. Lyles. No refills remain for either Rx. Thank you.    Medication renewals requested in this message routed to other providers:  metoprolol (TOPROL-XL) 100 MG 24 hr tablet [Jing Priest NP]  
Prescription approved per [facility] Refill Protocol.    
levothyroxine (SYNTHROID/LEVOTHROID) 75 MCG tablet     Last Written Prescription Date: 6/26/17  Last Quantity: 90, # refills: 0  Last Office Visit with [facility], [facility] or [facility] prescribing provider: 9/6/17        TSH   Date Value Ref Range Status   11/01/2016 2.76 0.40 - 4.00 mU/L Final       
160.02

## 2022-09-30 ENCOUNTER — CARE COORDINATION (OUTPATIENT)
Dept: SLEEP MEDICINE | Facility: CLINIC | Age: 47
End: 2022-09-30

## 2022-09-30 ENCOUNTER — MYC REFILL (OUTPATIENT)
Dept: FAMILY MEDICINE | Facility: CLINIC | Age: 47
End: 2022-09-30

## 2022-09-30 DIAGNOSIS — M51.369 DDD (DEGENERATIVE DISC DISEASE), LUMBAR: ICD-10-CM

## 2022-09-30 DIAGNOSIS — G47.52 RBD (REM BEHAVIORAL DISORDER): Primary | ICD-10-CM

## 2022-09-30 DIAGNOSIS — M45.8 ANKYLOSING SPONDYLITIS OF SACRAL REGION (H): ICD-10-CM

## 2022-09-30 RX ORDER — OXYCODONE HYDROCHLORIDE 5 MG/1
5 TABLET ORAL EVERY 6 HOURS PRN
Qty: 35 TABLET | Refills: 0 | Status: SHIPPED | OUTPATIENT
Start: 2022-09-30 | End: 2022-10-21

## 2022-10-10 ENCOUNTER — LAB (OUTPATIENT)
Dept: LAB | Facility: CLINIC | Age: 47
End: 2022-10-10
Payer: MEDICARE

## 2022-10-10 DIAGNOSIS — E78.1 HYPERTRIGLYCERIDEMIA: ICD-10-CM

## 2022-10-10 LAB
ANION GAP SERPL CALCULATED.3IONS-SCNC: 13 MMOL/L (ref 7–15)
BUN SERPL-MCNC: 10.7 MG/DL (ref 6–20)
CALCIUM SERPL-MCNC: 10.3 MG/DL (ref 8.6–10)
CHLORIDE SERPL-SCNC: 102 MMOL/L (ref 98–107)
CREAT SERPL-MCNC: 0.98 MG/DL (ref 0.67–1.17)
DEPRECATED HCO3 PLAS-SCNC: 25 MMOL/L (ref 22–29)
GFR SERPL CREATININE-BSD FRML MDRD: >90 ML/MIN/1.73M2
GLUCOSE SERPL-MCNC: 92 MG/DL (ref 70–99)
POTASSIUM SERPL-SCNC: 4.6 MMOL/L (ref 3.4–5.3)
SODIUM SERPL-SCNC: 140 MMOL/L (ref 136–145)

## 2022-10-10 PROCEDURE — 80048 BASIC METABOLIC PNL TOTAL CA: CPT | Performed by: PATHOLOGY

## 2022-10-10 PROCEDURE — 36415 COLL VENOUS BLD VENIPUNCTURE: CPT | Performed by: PATHOLOGY

## 2022-10-11 NOTE — RESULT ENCOUNTER NOTE
Your lab work looks normal. Keep up the good work.    Please call or return to clinic if you have further concerns.     Thank you,  JOCELYN Pro, NP-C

## 2022-10-13 NOTE — ADDENDUM NOTE
Addended by: TEA HUANG on: 12/27/2018 12:14 PM     Modules accepted: Orders     Cimetidine Pregnancy And Lactation Text: This medication is Pregnancy Category B and is considered safe during pregnancy. It is also excreted in breast milk and breast feeding isn't recommended.

## 2022-10-14 ENCOUNTER — TRANSFERRED RECORDS (OUTPATIENT)
Dept: HEALTH INFORMATION MANAGEMENT | Facility: CLINIC | Age: 47
End: 2022-10-14

## 2022-10-16 DIAGNOSIS — M51.369 DDD (DEGENERATIVE DISC DISEASE), LUMBAR: ICD-10-CM

## 2022-10-16 DIAGNOSIS — M45.8 ANKYLOSING SPONDYLITIS OF SACRAL REGION (H): ICD-10-CM

## 2022-10-17 NOTE — PROGRESS NOTES
Medication Therapy Management (MTM) Encounter    ASSESSMENT:                            Medication Adherence/Access: No issues identified    Gastroparesis: Stable    Weight Loss: Stable, dose increases may be limited by gastroparesis. Will continue to monitor and increase dose if tolerated. Will continue on Ozempic 0.5mg weekly. Patient also has diabetes and has not been checking blood sugars. May benefit from checking blood sugar if symptoms of hypoglycemia.    Ankylosing Spondylitis: May benefit from alternative therapy. Has appt this week to discuss with rheumatology.     Low Testosterone: Has appt scheduled with endocrinology to discuss further.    PLAN:                          Could consider checking blood sugar if symptoms of low blood sugar. Prescription for test strips sent to pharmacy.    Follow-up: 3 months.    SUBJECTIVE/OBJECTIVE:                          Joel Pineda is a 47 year old male called for a follow up visit. He was referred to me from Ksenia Lyles. Follow up from 7/19/2022.     Reason for visit: Medication Review.    Allergies/ADRs: Reviewed in chart  Past Medical History: Reviewed in chart  Tobacco: He reports that he has never smoked. He has never used smokeless tobacco.  Alcohol: none  Other Substance Use: None  Caffeine: 6 cups a day  Activity: None    Medication Adherence/Access: no issues reported  The patient fills medications at [place]: NO, fills medications at [facility].     Gastroparesis: Metoclopramide 5mg four times daily as needed (usually 2.5-5mg three times a day). This has been helpful and had appointment with [facility].  Tried baclofen for abdominal pain but this caused some mentation side effects. Has not seen a dietician because it is not covered by insurance. Has been trying to eat a low fiber diet (white bread in place of whole wheat), eating smaller meals throughout the day.     Weight Loss: current therapy includes Ozempic 0.5mg weekly Wednesdays. Does have some  nausea but unsure if due to Ozempic or gastroparesis. Ozempic has decreased his appetite. Has noticed about 4 pound weight loss. There are times he gets sweaty and wondering if this is due to low blood sugar.     Ankylosing Spondylitis: Allergic reaction to flu vaccine and was put on prednisone and this did a lot to help his back pain. So he is talking to Dr. Baxter about changing his therapy.     Low Testosterone: Sees endocrinology in November regarding testosterone. Urology thinks opioid usage is prohibiting testosterone from being useful.     Today's Vitals: There were no vitals taken for this visit.  ----------------    I spent 20 minutes with this patient today. All changes were made via collaborative practice agreement with Ksenia Lyles . A copy of the visit note was provided to the patient's provider(s).    The patient was sent via [employer] a summary of these recommendations.     Radha Mcdonald, Pharm.D, BCACP  Medication Therapy Management Pharmacist    Telemedicine Visit Details  Type of service:  Telephone visit  Start Time: 11:31 AM  End Time: 11:51AM  Originating Location (pt. Location): Home      Distant Location (provider location):  On-site  Provider has received verbal consent for a visit from the patient? Yes     Medication Therapy Recommendations  Type 2 diabetes mellitus with complication, without long-term current use of insulin (H)    Current Medication: blood glucose (CONTOUR NEXT TEST) test strip   Rationale: Dose too high - Dosage too high - Safety   Recommendation: Self-Monitoring   Status: Accepted per CPA

## 2022-10-18 ENCOUNTER — TELEPHONE (OUTPATIENT)
Dept: FAMILY MEDICINE | Facility: CLINIC | Age: 47
End: 2022-10-18

## 2022-10-18 ENCOUNTER — TRANSFERRED RECORDS (OUTPATIENT)
Dept: HEALTH INFORMATION MANAGEMENT | Facility: CLINIC | Age: 47
End: 2022-10-18

## 2022-10-18 ENCOUNTER — VIRTUAL VISIT (OUTPATIENT)
Dept: PHARMACY | Facility: CLINIC | Age: 47
End: 2022-10-18
Payer: COMMERCIAL

## 2022-10-18 DIAGNOSIS — M45.8 ANKYLOSING SPONDYLITIS OF SACRAL REGION (H): ICD-10-CM

## 2022-10-18 DIAGNOSIS — E11.9 TYPE 2 DIABETES MELLITUS WITHOUT COMPLICATION, WITHOUT LONG-TERM CURRENT USE OF INSULIN (H): Primary | ICD-10-CM

## 2022-10-18 DIAGNOSIS — R63.4 WEIGHT LOSS: ICD-10-CM

## 2022-10-18 DIAGNOSIS — K31.84 GASTROPARESIS: ICD-10-CM

## 2022-10-18 PROCEDURE — 99607 MTMS BY PHARM ADDL 15 MIN: CPT | Performed by: PHARMACIST

## 2022-10-18 PROCEDURE — 99606 MTMS BY PHARM EST 15 MIN: CPT | Performed by: PHARMACIST

## 2022-10-18 RX ORDER — NABUMETONE 500 MG/1
TABLET, FILM COATED ORAL
Qty: 120 TABLET | Refills: 0 | Status: SHIPPED | OUTPATIENT
Start: 2022-10-18 | End: 2022-11-21

## 2022-10-18 RX ORDER — DRONABINOL 2.5 MG/1
2.5 CAPSULE ORAL
COMMUNITY
End: 2023-02-21

## 2022-10-18 NOTE — TELEPHONE ENCOUNTER
Forms    Type of form/letter:  [facility] placed on Dr. Howe desk for signature    Have you been seen for this request:    Do we have the form/letter:     When is form/letter needed by:     How would you like the form/letter returned:     Patient Notified form requests are processed in 3-5 business days:    Okay to leave a detailed message?:

## 2022-10-18 NOTE — PATIENT INSTRUCTIONS
"Recommendations from today's MTM visit:                                                         Could consider checking blood sugar if symptoms of low blood sugar. Prescription for test strips sent to pharmacy    Follow-up: 3 months    It was great speaking with you today.  I value your experience and would be very thankful for your time in providing feedback in our clinic survey. In the next few days, you may receive an email or text message from [facility] with a link to a survey related to your  clinical pharmacist.\"     To schedule another MTM appointment, please call the clinic directly or you may call the MTM scheduling line at [phone] or toll-free at [phone].     My Clinical Pharmacist's contact information:                                                      Please feel free to contact me with any questions or concerns you have.      Radha Mcdonald, Pharm.D, [facility]  Medication Therapy Management Pharmacist      "

## 2022-10-19 ENCOUNTER — OFFICE VISIT (OUTPATIENT)
Dept: RHEUMATOLOGY | Facility: CLINIC | Age: 47
End: 2022-10-19
Payer: MEDICARE

## 2022-10-19 ENCOUNTER — MYC MEDICAL ADVICE (OUTPATIENT)
Dept: PALLIATIVE MEDICINE | Facility: CLINIC | Age: 47
End: 2022-10-19

## 2022-10-19 VITALS
SYSTOLIC BLOOD PRESSURE: 127 MMHG | DIASTOLIC BLOOD PRESSURE: 81 MMHG | HEART RATE: 93 BPM | OXYGEN SATURATION: 95 % | BODY MASS INDEX: 35.82 KG/M2 | WEIGHT: 310 LBS

## 2022-10-19 DIAGNOSIS — M47.816 LUMBAR FACET ARTHROPATHY: ICD-10-CM

## 2022-10-19 DIAGNOSIS — M47.817 LUMBOSACRAL SPONDYLOSIS WITHOUT MYELOPATHY: ICD-10-CM

## 2022-10-19 DIAGNOSIS — G89.29 CHRONIC MIDLINE LOW BACK PAIN WITHOUT SCIATICA: ICD-10-CM

## 2022-10-19 DIAGNOSIS — M45.9 ANKYLOSING SPONDYLITIS, UNSPECIFIED SITE OF SPINE (H): Primary | ICD-10-CM

## 2022-10-19 DIAGNOSIS — M54.50 CHRONIC MIDLINE LOW BACK PAIN WITHOUT SCIATICA: ICD-10-CM

## 2022-10-19 DIAGNOSIS — M51.369 DDD (DEGENERATIVE DISC DISEASE), LUMBAR: ICD-10-CM

## 2022-10-19 LAB
BASOPHILS # BLD AUTO: 0.1 10E3/UL (ref 0–0.2)
BASOPHILS NFR BLD AUTO: 1 %
CRP SERPL-MCNC: <2.9 MG/L (ref 0–8)
EOSINOPHIL # BLD AUTO: 0.1 10E3/UL (ref 0–0.7)
EOSINOPHIL NFR BLD AUTO: 1 %
ERYTHROCYTE [DISTWIDTH] IN BLOOD BY AUTOMATED COUNT: 12.8 % (ref 10–15)
ERYTHROCYTE [SEDIMENTATION RATE] IN BLOOD BY WESTERGREN METHOD: 5 MM/HR (ref 0–15)
HCT VFR BLD AUTO: 46.5 % (ref 40–53)
HGB BLD-MCNC: 15.6 G/DL (ref 13.3–17.7)
IMM GRANULOCYTES # BLD: 0 10E3/UL
IMM GRANULOCYTES NFR BLD: 1 %
LYMPHOCYTES # BLD AUTO: 3.6 10E3/UL (ref 0.8–5.3)
LYMPHOCYTES NFR BLD AUTO: 42 %
MCH RBC QN AUTO: 31.9 PG (ref 26.5–33)
MCHC RBC AUTO-ENTMCNC: 33.5 G/DL (ref 31.5–36.5)
MCV RBC AUTO: 95 FL (ref 78–100)
MONOCYTES # BLD AUTO: 1.1 10E3/UL (ref 0–1.3)
MONOCYTES NFR BLD AUTO: 12 %
NEUTROPHILS # BLD AUTO: 3.6 10E3/UL (ref 1.6–8.3)
NEUTROPHILS NFR BLD AUTO: 43 %
NRBC # BLD AUTO: 0 10E3/UL
NRBC BLD AUTO-RTO: 0 /100
PLATELET # BLD AUTO: 297 10E3/UL (ref 150–450)
RBC # BLD AUTO: 4.89 10E6/UL (ref 4.4–5.9)
WBC # BLD AUTO: 8.5 10E3/UL (ref 4–11)

## 2022-10-19 PROCEDURE — 86481 TB AG RESPONSE T-CELL SUSP: CPT | Performed by: INTERNAL MEDICINE

## 2022-10-19 PROCEDURE — 86140 C-REACTIVE PROTEIN: CPT | Performed by: INTERNAL MEDICINE

## 2022-10-19 PROCEDURE — 85652 RBC SED RATE AUTOMATED: CPT | Performed by: INTERNAL MEDICINE

## 2022-10-19 PROCEDURE — 99214 OFFICE O/P EST MOD 30 MIN: CPT | Performed by: INTERNAL MEDICINE

## 2022-10-19 PROCEDURE — 80076 HEPATIC FUNCTION PANEL: CPT | Performed by: INTERNAL MEDICINE

## 2022-10-19 PROCEDURE — 85025 COMPLETE CBC W/AUTO DIFF WBC: CPT | Performed by: INTERNAL MEDICINE

## 2022-10-19 PROCEDURE — 36415 COLL VENOUS BLD VENIPUNCTURE: CPT | Performed by: INTERNAL MEDICINE

## 2022-10-19 RX ORDER — MEDROXYPROGESTERONE ACETATE 150 MG/ML
50 INJECTION, SUSPENSION INTRAMUSCULAR WEEKLY
Qty: 4 ML | Refills: 7 | Status: SHIPPED | OUTPATIENT
Start: 2022-10-19

## 2022-10-19 NOTE — TELEPHONE ENCOUNTER
Forms    Type of form/letter:   Completed form received from [facility] faxed to [phone] and sent to [employer] for scanning into patients chart    Have you been seen for this request:    Do we have the form/letter:     When is form/letter needed by:     How would you like the form/letter returned:     Patient Notified form requests are processed in 3-5 business days:    Okay to leave a detailed message?:

## 2022-10-19 NOTE — PATIENT INSTRUCTIONS
RHEUMATOLOGY    Dr. Oneil Baxter    [facility]  [address] [place] [address]  [place] [address]  Phone number: [phone]  Fax number: [phone]    You may schedule your FLU shot by calling [phone] or if you would like to get your shot at a [place] pharmacy you may schedule online at [email or web address].    Thank you for choosing [facility]!    Mackenzie Cavazos CMA Rheumatology

## 2022-10-19 NOTE — PROGRESS NOTES
"  Rheumatology Clinic Visit      Joel Pineda MRN# [id number]   YOB: 1975 Age: 47 year old      Date of visit: 10/19/22   PCP: Dr. Ksenia Lyles    Chief Complaint   Patient presents with:  Ankylosing spondylitis    Assessment and Plan     1.  Ankylosing spondylitis: Many years of inflammatory back pain but no clear sacroiliitis seen on x-rays or MRIs; then had a lumbar spine x-ray on 2/23/2018 at [facility] showing \"Moderate L5-S1 and mild L4-5 degenerative disc disease and degenerative facet arthropathy. No evidence for fracture, subluxation, or spondylolisthesis. Chronic bridging enthesophyte across the lower right SI joint with irregularity of the joint space suspicious for sequelae of chronic inflammatory sacroiliitis. Correlate clinically.\"  This is complicated by a history of back surgery and degenerative changes.  HLA-B27 positive per record review but the actual lab report has not been seen.  He has a personal history of diverticulitis requiring sigmoidectomy.  Reportedly his mother has ulcerative colitis. Based on his symptoms, the x-ray results, and HLA-B27 positive, it is most likely ankylosing spondylitis and Remicade was started with improvement of lower back pain and resolution of lower back stiffness. However, Remicade was also associated with increased infections so it was changed to Simponi; Simponi was associated with hives, nausea, dizziness, and drowsiness, Humira (effective but associated with a sensation of burning on his tongue, and longstanding nausea that didn't seem related to me but did to Mr. Pineda). Has responded to TNF inhibitors so Enbrel was started and has responded well to Enbrel (with worsening arthritis symptoms each time Enbrel has been held).  Previously with mild right Achilles tendon pain that has since resolved with PT exercises.  Most recently with back pain treated with prednisone taper starting with 60mg daily that improved the back pain so he asked his pain " management provider if he could continue on low dose prednisone and was told that that was resolved for inflammatory arthritis patients so he is asking today about low dose prednisone; the low back pain currently is degenerative in nature (worse with activity, better with rest, worse with standing up straight for prolonged periods of time; and degenerative changes were seen on 2019 L-spine MRI) so will not be adding steroids for AS indication.  He will continue following with the pain management clinic for the degenerative low back pain.  Chronic illness  - Continue Enbrel 50mg SQ every 7 days  - Labs now: CBC, hepatic panel, ESR, CRP, Quantiferon TB gold plus     2.  Degenerative low back pain: Following in the pain management clinic and has been evaluated by neurosurgery.  Documented here for historical significance; also see #1.    3. Obesity; reported hx of fatty liver disease, BMI 35.82: reports losing weight with weight loss medication from another clinic. Not doing much physical activity; encouraged increase physical activity such as walking.  Chronic illness, stable.      4. Hx of Neck pain: Has improved with PT exercises.  Not an issue today.    5.  Hidradenitis suppurativa: Continue following with dermatology    6.  Vaccinations: Vaccinations reviewed with Mr. Pineda.    - Influenza: up to date  - [id number]: up to date  - [id number]: up to date  - Shingrix: Up to date   - COVID-19: Up to date    Total minutes spent in evaluation with patient, documentation, , and review of pertinent studies and chart notes: 20     Mr. Pineda verbalized agreement with and understanding of the rational for the diagnosis and treatment plan.  All questions were answered to best of my ability and the patient's satisfaction. Mr. Pineda was advised to contact the clinic with any questions that may arise after the clinic visit.      Thank you for involving me in the care of the patient    Return to clinic: 6 months      HPI    Joel Pineda is a 47 year old male with a past medical history significant for hypertension, hyperlipidemia, asthma, history of pulmonary embolism, history of diverticulitis requiring sigmoidectomy, GERD, obstructive sleep apnea, bipolar disorder, hypothyroidism, B12 deficiency, CKD? (reportedly issue with contrast) and chronic pain syndrome who presents for follow-up of ankylosing spondylitis.    Today, 10/19/2022: hidradenitis suppurativa controlled.  Generally doing well with Enbrel and trigger point injections; intentionally losing weight with weight loss medication but not doing much PT. Back pain a little better with PT exercises.  Used prednisone taper starting with 60mg daily from pain management for low back pain that doesn't radiate and it was very helpful; now with low back pain that is worse with activity, worse with standing up straight for a prolonged period of time, and better with inactivity.  Achilles tendon pain on the right has resolved with exercises.  No joint swelling. Morning stiffness for about 45 min.     Mother: ulcerative colitis    Tobacco: None  EtOH: None  Drugs: None    ROS   12 point review of system was completed and negative except as noted in the HPI     Active Problem List     Patient Active Problem List   Diagnosis     Intermittent asthma     Hyperlipidemia LDL goal <100     Chronic nonallergic rhinitis     Diverticulosis     GERD (gastroesophageal reflux disease)     Generalized anxiety disorder     LLOYD (obstructive sleep apnea)- mild (AHI 11)     Intracranial arachnoid cyst     Facet arthritis of cervical region     Acquired hypothyroidism     Bipolar 2 disorder (H)     Chronic midline low back pain without sciatica     Irritable bowel syndrome with diarrhea     B12 deficiency     Essential hypertension with goal blood pressure less than 140/90     Chronic, continuous use of opioids     Ankylosing spondylitis of sacral region (H)     Morbid obesity (H)     Fatty  infiltration of liver     DDD (degenerative disc disease), lumbar     Type 2 diabetes mellitus with complication, without long-term current use of insulin (H)     Peripheral polyneuropathy     History of pulmonary embolism     Ingrown toenail     Hypertriglyceridemia     Gastroparesis     Orthostatic dizziness     POTS (postural orthostatic tachycardia syndrome)     PHN (postherpetic neuralgia)     Dysautonomia (H)     Chronic pain syndrome     Borderline personality disorder (H)     MDD (major depressive disorder), recurrent severe, without psychosis (H)     Folliculitis     Immunosuppression (H)     Small fiber neuropathy     RBD (REM behavioral disorder)     Past Medical History     Past Medical History:   Diagnosis Date     Acne      Acquired hypothyroidism      Allergic state      Ankylosing spondylitis lumbar region (H)      Ankylosing spondylitis of sacral region (H)      Anxiety      Bipolar 2 disorder (H)      Chest pain     Chest pain, regulated w/BP meds. Clear arteries.     Chronic pain      DDD (degenerative disc disease), lumbar      Depressive disorder      Diabetes (H)      Diverticulosis      Facet arthritis of cervical region      Gastroesophageal reflux disease      Hypertension      IBS (irritable bowel syndrome)      Intracranial arachnoid cyst      Major depressive disorder, recurrent episode (H)     Multiple psych providers - they manage meds August 2015: Provigil induced severe mood dis-function      Major depressive disorder, recurrent episode, severe (H) 12/2/2020     MDD (major depressive disorder), recurrent severe, without psychosis (H) 7/21/2021     LLOYD (obstructive sleep apnea)- mild (AHI 11)      Polyneuropathy      Pulmonary embolism (H)      Skin exam, screening for cancer 12/3/2013     Sleep apnea      Uncomplicated asthma      Past Surgical History     Past Surgical History:   Procedure Laterality Date     BACK SURGERY  10/2007    lumbar discectomy L5-S1     BIOPSY  09/15/2020     Colon Adenomas x2     COLONOSCOPY      Note: colonoscopy scheduled with [facility] on Friday, 9/4/15     COSMETIC SURGERY  2012    Nose Exterior - functional     GI SURGERY  08/2013    Sigmoidectomy     HERNIA REPAIR, UMBILICAL  08/23/2011    small, Dr. Evan Beavers     INCISION AND DRAINAGE, ABSCESS, COMPLEX  08/23/2011    umbilical, Dr. Evan Beavers     LAPAROSCOPIC ASSISTED COLECTOMY LEFT (DESCENDING)  08/15/2013    Procedure: LAPAROSCOPIC ASSISTED COLECTOMY LEFT (DESCENDING);  Laparoscopic Hand Assisted Sigmoid Resection, Mobilization of Splenic Fissure, coloproctoscopy, *Latex Free Room* Anesthesia General with Pain block  ;  Surgeon: Aurora Justice MD;  Location: [facility] OR     NERVE SURGERY  08/18/2011    RF ablation @ L3-S1 @ [facility]     RECONSTRUCT NOSE AND SEPTUM (FUNCTIONAL)  10/14/2011    Procedure:RECONSTRUCT NOSE AND SEPTUM (FUNCTIONAL); Functional Septorhinoplasty, Turbinate Reduction, ; Surgeon:CEDRIC CUEVAS; Location:[facility] OR     SINUS SURGERY  10/01/2001    ethmoidectomy chronic sinusitis     SOFT TISSUE SURGERY  4/7/2022    Hidradenitis Suppurativa surgery     Allergy     Allergies   Allergen Reactions     Amoxicillin-Pot Clavulanate Difficulty breathing     Banana Shortness Of Breath     Pt reports organic Banana is okay.      Nitroglycerin Palpitations     Penicillins Anaphylaxis     Provigil [Modafinil] Shortness Of Breath     headache     Flu Virus Vaccine Rash     Gadolinium Hives and Itching     Patient was premedicated for the contrast allergy. He did still have a reaction a few hours after injection. Hives and itching. Dr. Gomez told tech to inform pt he should only have contrast again in the future when premedicated and at a hospital. Not at an outpatient facility.      Ketoconazole      Topical cream caused swelling and itching     Dye [Contrast Dye] Other (See Comments) and Hives     Moderate flushing, CT contrast     Gabapentin      Other reaction(s): hives     Golimumab      Hives,  bradycardia, face swelling     Naproxen      Other reaction(s): Bleeding Gums     Neurontin [Gabapentin] Hives     Moderate hives     Nortriptyline Hives     Nystatin Hives     Varicella Virus Vaccine Live      Rash     Baclofen Other (See Comments)     Cognitive changes     Flagyl [Metronidazole Hcl] Palpitations and Hives     Latex Rash     Metronidazole Palpitations, Other (See Comments) and Rash     dizziness (versus ciprofloxacin taken at same time)     Current Medication List     Current Outpatient Medications   Medication Sig     acetaminophen 500 MG CAPS Take 1,000 mg by mouth 2 times daily      albuterol (PROAIR HFA/PROVENTIL HFA/VENTOLIN HFA) 108 (90 Base) MCG/ACT inhaler Inhale 2 puffs into the lungs every 4 hours as needed for shortness of breath / dyspnea or wheezing     aspirin (ASA) 81 MG tablet Take 81 mg by mouth daily      buPROPion (WELLBUTRIN XL) 150 MG 24 hr tablet 150 mg every morning     cetirizine (ZYRTEC) 10 MG tablet Take 1 tablet (10 mg) by mouth At Bedtime     cholecalciferol (D3-50) 1250 mcg (02657 units) capsule TAKE ONE CAPSULE BY MOUTH EVERY 2 WEEKS.     clindamycin (CLINDAMAX) 1 % external gel For flares, start twice daily     clonazePAM (KLONOPIN) 0.5 MG tablet Take 0.5 mg by mouth 2 times daily as needed      cyanocobalamin (CYANOCOBALAMIN) 1000 MCG/ML injection INJECT 1 ML INTO THE MUSCLE EVERY 30 DAYS     dronabinol (MARINOL) 2.5 MG capsule Take 2.5 mg by mouth 2 times daily (before meals)     DULoxetine (CYMBALTA) 60 MG capsule Take 120 mg by mouth daily      ergocalciferol (ERGOCALCIFEROL) 1.25 MG (76026 UT) capsule take 1 capsule by ORAL route every 2 weeks     eszopiclone (LUNESTA) 3 MG tablet Take 3 mg by mouth At Bedtime      etanercept (ENBREL SURECLICK) 50 MG/ML autoinjector Inject 50 mg Subcutaneous once a week . Hold for signs of infection, and seek medical attention.     famotidine (PEPCID) 20 MG tablet Prior to administration of Humira every 2 weeks (Patient taking  differently: Take 20 mg by mouth every 7 days Prior to Enbrel Injections to prevent skin reaction)     fenofibrate (TRICOR) 48 MG tablet TAKE 1 TABLET BY MOUTH ONCE DAILY     fluticasone (FLONASE) 50 MCG/ACT nasal spray Spray 2 sprays into both nostrils daily     fluticasone-salmeterol (ADVAIR) 500-50 MCG/ACT inhaler Inhale 1 puff into the lungs every 12 hours     levothyroxine (SYNTHROID/LEVOTHROID) 75 MCG tablet TAKE ONE TABLET BY MOUTH EVERY MORNING     lidocaine (XYLOCAINE) 5 % external ointment APPLY TOPICALLY 4 TIMES DAILY AS NEEDED FOR PAIN     melatonin 3 MG tablet Take 13 mg by mouth nightly as needed      methocarbamol (ROBAXIN) 500 MG tablet TAKE 1 - 2 TABLETS BY MOUTH 4 TIMES DAILY AS NEEDED FOR MUSCLE SPASMS     metoclopramide (REGLAN) 5 MG tablet Take 5 mg by mouth 4 times daily as needed     metoprolol succinate ER (TOPROL-XL) 200 MG 24 hr tablet TAKE ONE TABLET BY MOUTH TWICE DAILY     montelukast (SINGULAIR) 10 MG tablet TAKE ONE TABLET BY MOUTH EVERY EVENING     nabumetone (RELAFEN) 500 MG tablet TAKE 1-2 TABLETS BY MOUTH TWICE DAILY WITH FOOD AS NEEDED FOR MODERATE PAIN     olopatadine (PATADAY) 0.2 % ophthalmic solution Place 1 drop into both eyes daily     omega-3 acid ethyl esters (LOVAZA) 1 g capsule TAKE 2 CAPSULES BY MOUTH TWICE DAILY     omeprazole 20 MG tablet Take 20 mg by mouth daily      ondansetron (ZOFRAN-ODT) 8 MG ODT tab Take 8 mg by mouth every 8 hours as needed for nausea     oxyCODONE (ROXICODONE) 5 MG tablet Take 1 tablet (5 mg) by mouth every 6 hours as needed for pain (maximum 6 tablet(s) per day)     pregabalin (LYRICA) 150 MG capsule Take 1 capsule (150 mg) by mouth 3 times daily (Patient taking differently: Take 150 mg by mouth 2 times daily)     pseudoePHEDrine-guaiFENesin (MUCINEX D)  MG 12 hr tablet      pyridostigmine (MESTINON) 60 MG tablet Take 1 tablet by mouth 3 times daily     ramipril (ALTACE) 10 MG capsule TAKE 1 CAPSULE BY MOUTH ONE TIME DAILY      "risperiDONE (RISPERDAL) 0.5 MG tablet Take 0.5 mg by mouth 2 times daily as needed     rizatriptan (MAXALT) 10 MG tablet Take 1 tablet (10 mg) by mouth at onset of headache for migraine May repeat in 2 hours. Max 3 tablets/24 hours.     rosuvastatin (CRESTOR) 40 MG tablet TAKE ONE TABLET BY MOUTH ONCE DAILY     semaglutide (OZEMPIC, 0.25 OR 0.5 MG/DOSE,) 2 MG/1.5ML SOPN pen Inject 0.25 mg Subcutaneous every 7 days for 28 days, THEN 0.5 mg every 7 days.     sodium fluoride 1.1 % CREA At Bedtime     valACYclovir (VALTREX) 500 MG tablet TAKE ONE TABLET BY MOUTH ONCE DAILY     vitamin B complex with vitamin C (STRESS TAB) tablet Take 1 tablet by mouth daily     ZINC SULFATE-VITAMIN C MT Take 1 tablet by mouth daily     blood glucose (CONTOUR NEXT TEST) test strip Use to test blood sugar one to two times daily or as directed.     EPINEPHrine (ANY BX GENERIC EQUIV) 0.3 MG/0.3ML injection 2-pack Inject 0.3 mLs (0.3 mg) into the muscle once as needed for anaphylaxis     naloxone (NARCAN) 4 MG/0.1ML nasal spray Spray 1 spray (4 mg) into one nostril alternating nostrils once as needed for opioid reversal every 2-3 minutes until assistance arrives (Patient not taking: Reported on 9/15/2022)     order for DME Equipment being ordered: lumbosacral belt/brace     order for DME Respironics REMSTAR 60 Series Auto CPAP 9-13 cm H2O, Wisp nasal mask w/a large cushion and a chinstrap     syringe/needle, disp, (BD INTEGRA SYRINGE) 25G X 1\" 3 ML MISC USE WITH B12 INJECTIONS     No current facility-administered medications for this visit.         Social History   See HPI    Family History     Family History   Problem Relation Age of Onset     Musculoskeletal Disorder Mother         back     Anxiety Disorder Mother      Colon Polyps Mother      Ulcerative Colitis Mother         and ischemic small intestine, surgery     Hypertension Mother      Breast Cancer Mother      Osteoporosis Mother      Diabetes Mother         Type 2, Diagnosed in " "2014     Depression Mother         Takes Cymbalta to help with chronic pain + depx     Thyroid Disease Mother         Hypothyroidism     Obesity Mother         Under much better control latter half of 2015     Musculoskeletal Disorder Father         back     Substance Abuse Father         Alcohol     Hypertension Father      Hyperlipidemia Father      Depression Father         Off meds for many years. Seems \"ok\"     Anxiety Disorder Father      Heart Disease Maternal Grandmother      Heart Disease Maternal Grandfather      Psychotic Disorder Paternal Grandfather      Suicide Paternal Grandfather      Depression Paternal Grandfather         Pediatrician. Committed suicide by pistol in 1990.     Musculoskeletal Disorder Brother         back     Depression Brother         Expressed as anger and moodiness     Substance Abuse Brother         Alcohol     Anxiety Disorder Brother      Substance Abuse Sister         Alcohol     Depression Sister         Mental Health Therapist, yet no anti-depressants?     Anxiety Disorder Sister         Mental Health Therapist, yet no anti-anxiety meds?     Other Cancer Other         Bladder     Substance Abuse Brother      Colon Cancer No family hx of      Crohn's Disease No family hx of      Anesthesia Reaction No family hx of      Cancer No family hx of         No family history of skin cancer     Physical Exam     Temp Readings from Last 3 Encounters:   12/30/21 98  F (36.7  C) (Oral)   11/30/21 98  F (36.7  C) (Tympanic)   08/17/21 98.3  F (36.8  C) (Oral)     BP Readings from Last 5 Encounters:   09/15/22 125/85   09/08/22 128/73   08/10/22 122/72   07/11/22 130/87   06/20/22 118/76     Pulse Readings from Last 1 Encounters:   09/15/22 69     Resp Readings from Last 1 Encounters:   06/08/22 16     Estimated body mass index is 36.4 kg/m  as calculated from the following:    Height as of 8/10/22: 1.981 m (6' 6\").    Weight as of 8/10/22: 142.9 kg (315 lb).    GEN: NAD.  Obese  HEENT:  " Anicteric, noninjected sclera. No obvious external lesions of the ear and nose. Hearing intact.  CV: S1, S2. RRR. No m/r/g  PULM: No increased work of breathing. CTA bilaterally   MSK: MCPs, PIPs, DIPs without swelling or tenderness to palpation.  Wrists without swelling or tenderness to palpation.  Elbows and shoulders without swelling or tenderness to palpation.   Knees, ankles, and MTPs without swelling or tenderness to palpation.  Achilles tendons nontender to palpation, and without swelling, increased warmth, or overlying erythema.  Nontender to palpation over the lumbar spine and SI joints.    SKIN: No nail pitting.  No peripheral rash seen  PSYCH: Alert. Appropriate.      Labs / Imaging (select studies)     RF/CCP  Recent Labs   Lab Test 08/07/15  0846 09/03/14  1232   RHF <20 <20     CBC  Recent Labs   Lab Test 11/10/21  0930 10/09/20  1550 12/27/19  1209 09/26/19  1010   WBC 6.7 9.6 8.0 7.7   RBC 4.73 4.92 5.20 5.15   HGB 14.6 15.2 14.9 15.2   HCT 43.9 45.9 45.0 46.2   MCV 93 93 87 90   RDW 12.9 13.2 14.4 13.8    264 263 262   MCH 30.9 30.9 28.7 29.5   MCHC 33.3 33.1 33.1 32.9   NEUTROPHIL 48 51.2 45.0 39.7   LYMPH 41 35.2 38.1 44.9   MONOCYTE 8 10.2 13.7 12.2   EOSINOPHIL 2 2.1 2.4 2.3   BASOPHIL 1 1.1 0.8 0.9   ANEU  --  4.9 3.6 3.1   ALYM  --  3.4 3.0 3.5   KATIE  --  1.0 1.1 0.9   AEOS  --  0.2 0.2 0.2   ABAS  --  0.1 0.1 0.1   ANEUTAUTO 3.2  --   --   --    ALYMPAUTO 2.7  --   --   --    AMONOAUTO 0.5  --   --   --    AEOSAUTO 0.1  --   --   --    ABSBASO 0.1  --   --   --      CMP  Recent Labs   Lab Test 10/10/22  1320 04/21/22  0853 02/10/22  0942 11/10/21  0930 10/16/20  0824 10/09/20  1550 07/03/20  1251 01/24/20  1637     --  137 138 135 139 139 136   POTASSIUM 4.6  --  4.3 4.2 4.2 4.1 3.9 4.4   CHLORIDE 102  --  106 111* 104 108 104 106   CO2 25  --  24 25 26 22 30 24   ANIONGAP 13  --  7 2* 5 8 5 6   GLC 92  --  113* 136* 114* 128* 105* 99   BUN 10.7  --  12 12 10 11 17 14   CR 0.98   --  0.95 0.74 1.28* 0.77 0.84 0.81   GFRESTIMATED >90  --  >90 >90 66 >90 >90 >90   GFRESTBLACK  --   --   --   --  77 >90 >90 >90   ALYCE 10.3*  --  9.7 9.1 9.4 9.3 9.4 9.2   BILITOTAL  --   --   --  0.4  --  0.4  --  0.4   ALBUMIN  --   --   --  4.0  --  4.3  --  4.4   PROTTOTAL  --   --   --  7.6  --  7.9  --  7.7   ALKPHOS  --   --   --  91  --  113  --  104   AST  --   --   --  40  --  53*  --  60*   ALT  --  40  --  38  --  53  --  61     Calcium/VitaminD  Recent Labs   Lab Test 10/10/22  1320 02/10/22  0942 11/10/21  0930 02/10/21  1809 01/24/20  1637 09/13/19  0943   ALYCE 10.3* 9.7 9.1  --    < >  --    VITDT  --  46  --  38  --  40    < > = values in this interval not displayed.     ESR/CRP  Recent Labs   Lab Test 11/10/21  0930 10/09/20  1550 07/03/20  1251 09/26/19  1010   SED 6  --  4 4   CRP <2.9 <2.9  --  <2.9     Hepatitis B  Recent Labs   Lab Test 02/28/18  1157 01/06/15  1028   HBCAB Nonreactive  --    HEPBANG Nonreactive Nonreactive     Hepatitis C  Recent Labs   Lab Test 02/28/18  1157 01/06/15  1028   HCVAB Nonreactive Nonreactive   Assay performance characteristics have not been established for newborns,   infants, and children       Lyme ab screening  Recent Labs   Lab Test 02/22/19  1457   LYMEGM 0.02     Tuberculosis Screening  Recent Labs   Lab Test 04/23/21  1442 02/28/18  1157 01/06/15  1028   TBRSLT  --  Negative Negative   TBAGN  --  0.00 0.00   TBRST Negative  --   --      Immunization History     Immunization History   Administered Date(s) Administered     COVID-19,PF,Pfizer (12+ Yrs) 03/24/2021, 04/14/2021, 08/23/2021     COVID-19,PF,Pfizer 12+ YRS BIVALENT Booster 09/23/2022     COVID-19,PF,Pfizer 12+ Yrs (2022 and After) 03/28/2022, 07/29/2022     FLU 6-35 months 10/15/2008, 01/03/2019     Flu, Unspecified 08/01/2012, 09/12/2019, 08/20/2020     HepB-Adult 02/10/2021, 03/10/2021, 07/14/2021     Influenza (H1N1) 02/04/2010     Influenza (IIV3) PF 11/11/1999, 10/28/2003, 10/15/2008,  08/21/2012, 08/02/2013, 09/09/2013, 09/27/2017, 01/03/2019, 09/12/2019, 01/14/2020     Influenza Vaccine IM > 6 months Valent IIV4 (Alfuria,Fluzone) 10/02/2015, 10/10/2016, 09/27/2017, 09/07/2018, 01/03/2019, 09/12/2019, 01/14/2020     Influenza Vaccine Im 4yrs+ 4 Valent CCIIV4 09/08/2021     Influenza,INJ,MDCK,PF,Quad >6mo(Flucelvax-RMG) 08/20/2020     Pneumo Conj 13-V (2010&after) 10/02/2015     Pneumococcal 23 valent 05/15/2009, 10/10/2016     TDAP Vaccine (Adacel) 07/16/2010, 01/23/2020     Td (Adult), Adsorbed 10/13/1997, 10/04/2002     Tdap (Adacel,Boostrix) 01/23/2020     Zoster vaccine recombinant adjuvanted (SHINGRIX) 05/29/2019, 09/12/2019          Chart documentation done in part with Dragon Voice recognition Software. Although reviewed after completion, some word and grammatical error may remain.    Oneil Baxter MD

## 2022-10-20 LAB
ALBUMIN SERPL-MCNC: 4.4 G/DL (ref 3.4–5)
ALP SERPL-CCNC: 80 U/L (ref 40–150)
ALT SERPL W P-5'-P-CCNC: 31 U/L (ref 0–70)
AST SERPL W P-5'-P-CCNC: 30 U/L (ref 0–45)
BILIRUB DIRECT SERPL-MCNC: <0.1 MG/DL (ref 0–0.2)
BILIRUB SERPL-MCNC: 0.5 MG/DL (ref 0.2–1.3)
PROT SERPL-MCNC: 8 G/DL (ref 6.8–8.8)

## 2022-10-20 NOTE — TELEPHONE ENCOUNTER
03/17/2016 Procedure performed: bilateral L3, 4 and 5 lumbar radiofrequency ablation   10/26/2016 Procedure performed: bilateral L3, 4 and 5 lumbar radiofrequency ablation   05/26/2021 Procedure performed: bilateral L3, 4 and 5 lumbar radiofrequency ablation     Will pend order for review.     Patient is currently schedule for :   11/07/22 left lateral femoral cutaneous nerve block  11/10/22 follow up with Stacia Peters

## 2022-10-21 ENCOUNTER — MYC REFILL (OUTPATIENT)
Dept: FAMILY MEDICINE | Facility: CLINIC | Age: 47
End: 2022-10-21

## 2022-10-21 DIAGNOSIS — M51.369 DDD (DEGENERATIVE DISC DISEASE), LUMBAR: ICD-10-CM

## 2022-10-21 DIAGNOSIS — M45.8 ANKYLOSING SPONDYLITIS OF SACRAL REGION (H): ICD-10-CM

## 2022-10-21 LAB
GAMMA INTERFERON BACKGROUND BLD IA-ACNC: 0.04 IU/ML
M TB IFN-G BLD-IMP: NEGATIVE
M TB IFN-G CD4+ BCKGRND COR BLD-ACNC: 9.96 IU/ML
MITOGEN IGNF BCKGRD COR BLD-ACNC: 0 IU/ML
MITOGEN IGNF BCKGRD COR BLD-ACNC: 0 IU/ML
QUANTIFERON MITOGEN: 10 IU/ML
QUANTIFERON NIL TUBE: 0.04 IU/ML
QUANTIFERON TB1 TUBE: 0.04 IU/ML
QUANTIFERON TB2 TUBE: 0.04

## 2022-10-21 RX ORDER — OXYCODONE HYDROCHLORIDE 5 MG/1
5 TABLET ORAL EVERY 6 HOURS PRN
Qty: 35 TABLET | Refills: 0 | Status: SHIPPED | OUTPATIENT
Start: 2022-10-21 | End: 2022-11-09

## 2022-10-23 DIAGNOSIS — M51.369 DDD (DEGENERATIVE DISC DISEASE), LUMBAR: ICD-10-CM

## 2022-10-24 DIAGNOSIS — M51.369 DDD (DEGENERATIVE DISC DISEASE), LUMBAR: ICD-10-CM

## 2022-10-24 RX ORDER — PREGABALIN 150 MG/1
CAPSULE ORAL
Qty: 270 CAPSULE | Refills: 0 | Status: SHIPPED | OUTPATIENT
Start: 2022-10-24 | End: 2023-01-23

## 2022-10-24 RX ORDER — PREGABALIN 150 MG/1
CAPSULE ORAL
Qty: 270 CAPSULE | Refills: 0 | OUTPATIENT
Start: 2022-10-24

## 2022-10-24 NOTE — TELEPHONE ENCOUNTER
Last seen one month ago.  CSA one month ago   Last UDS one month ago with narcotic and oxycodone   [facility]  Med refilled

## 2022-10-24 NOTE — TELEPHONE ENCOUNTER
Refill already sent by another provider this morning.  JOCELYN Pro, NP-C  [facility]

## 2022-10-30 ENCOUNTER — MYC MEDICAL ADVICE (OUTPATIENT)
Dept: PULMONOLOGY | Facility: CLINIC | Age: 47
End: 2022-10-30

## 2022-10-30 DIAGNOSIS — J45.30 MILD PERSISTENT ASTHMA, UNSPECIFIED WHETHER COMPLICATED: Primary | ICD-10-CM

## 2022-10-31 RX ORDER — INHALER, ASSIST DEVICES
SPACER (EA) MISCELLANEOUS
Qty: 1 EACH | Refills: 0 | Status: SHIPPED | OUTPATIENT
Start: 2022-10-31 | End: 2023-05-13

## 2022-10-31 RX ORDER — FLUTICASONE PROPIONATE AND SALMETEROL XINAFOATE 230; 21 UG/1; UG/1
2 AEROSOL, METERED RESPIRATORY (INHALATION) 2 TIMES DAILY
Qty: 12 G | Refills: 11 | Status: SHIPPED | OUTPATIENT
Start: 2022-10-31 | End: 2022-12-14

## 2022-10-31 NOTE — TELEPHONE ENCOUNTER
DME order for a Spacer chamber has been faxed to [facility] per pt request.      Slime Szymanski LPN  Pulmonary Medicine:  [facility]  Phone: [phone] Fax: [phone]

## 2022-11-02 ENCOUNTER — E-VISIT (OUTPATIENT)
Dept: FAMILY MEDICINE | Facility: CLINIC | Age: 47
End: 2022-11-02
Payer: MEDICARE

## 2022-11-02 DIAGNOSIS — J06.9 UPPER RESPIRATORY TRACT INFECTION, UNSPECIFIED TYPE: Primary | ICD-10-CM

## 2022-11-02 PROCEDURE — 99421 OL DIG E/M SVC 5-10 MIN: CPT | Performed by: FAMILY MEDICINE

## 2022-11-03 ENCOUNTER — LAB (OUTPATIENT)
Dept: URGENT CARE | Facility: URGENT CARE | Age: 47
End: 2022-11-03
Attending: FAMILY MEDICINE
Payer: MEDICARE

## 2022-11-03 DIAGNOSIS — J06.9 UPPER RESPIRATORY TRACT INFECTION, UNSPECIFIED TYPE: ICD-10-CM

## 2022-11-03 LAB
DEPRECATED S PYO AG THROAT QL EIA: NEGATIVE
FLUAV AG SPEC QL IA: NEGATIVE
FLUBV AG SPEC QL IA: NEGATIVE
GROUP A STREP BY PCR: NOT DETECTED

## 2022-11-03 PROCEDURE — U0005 INFEC AGEN DETEC AMPLI PROBE: HCPCS

## 2022-11-03 PROCEDURE — 87651 STREP A DNA AMP PROBE: CPT

## 2022-11-03 PROCEDURE — 87804 INFLUENZA ASSAY W/OPTIC: CPT | Mod: 59

## 2022-11-03 PROCEDURE — U0003 INFECTIOUS AGENT DETECTION BY NUCLEIC ACID (DNA OR RNA); SEVERE ACUTE RESPIRATORY SYNDROME CORONAVIRUS 2 (SARS-COV-2) (CORONAVIRUS DISEASE [COVID-19]), AMPLIFIED PROBE TECHNIQUE, MAKING USE OF HIGH THROUGHPUT TECHNOLOGIES AS DESCRIBED BY CMS-2020-01-R: HCPCS

## 2022-11-04 LAB — SARS-COV-2 RNA RESP QL NAA+PROBE: NEGATIVE

## 2022-11-07 NOTE — TELEPHONE ENCOUNTER
Routing to provider and interventionalist as FYI     Sep 2021 MR from Main has been pushed to PACS     Faith DEVRIES RN Care Coordinator  [facility]        RFA  11/5/2022 3:59 PM Reply    To: PAIN NURSE      From: Tito Pineda      Created: 11/5/2022 3:59 PM        *-*-*This message has not been handled.*-*-*    Bennett Palomo,     Just following up on your message. Note that I had a lumber MRI @ [facility] on 9-. Unfortunately including the full report is 2000 characters too long for [employer], so it's truncated below. If you're unable to access the [facility] record, I'll print the full report and create an attachment if you'd like.      EXAM: MR SPINE LUMBAR WO  LOCATION: [facility]  DATE/TIME: 9/17/2021 3:31 PM     INDICATION: Low back pain, prior surgery, new symptoms pain in right leg, difficulty driving, difficulty ambulating.     COMPARISON: 02/23/2018 plain film study of the lumbar spine, prior body CT 03/17/2021.  TECHNIQUE: Routine Lumbar Spine MRI without IV contrast.     FINDINGS:   ...     L3-L4: .... mild degenerative changes involving the facet joints bilaterally.     L4-L5: .... Mild narrowing of the left L5 subarticular recess with facet joint arthropathy. Suspected left laminotomy change.     L5-S1: .... Mild narrowing left S1 subarticular recess with facet joint arthropathy.     IMPRESSION:  1.  Degenerative changes with interspace narrowing and disc herniations with associated annular tear/disruptions L3-L4 through L5-S1 as above.     2.  No high-grade spinal stenosis or high-grade foraminal narrowing.     3.  Moderate foraminal stenosis is noted bilaterally L4-L5 and L5-S1. Correlate for possible unilateral or bilateral L4 and/or L5 radiculopathy.     4.  Correlate for possible left S1 radiculopathy discussed at the L5-S1 level.

## 2022-11-08 NOTE — PROGRESS NOTES
Joel Pineda is a 47 year old male who is being evaluated via a billable video visit.        [facility]    Consulting provider: Yoan Khoury MD  [address]  [place] [address]    Reason for consultation: low testosterone    HPI:   Joel Pineda is a 47 year old male is coming in in regards to question about low testosterone.  Patient was evaluated for testicular varices by urology.  Labs are drawn for testosterone.    Low libido decrease in sex drive, years  Erectile dysfunction no med    Sleep: snoring CPAP,     Urinary symptoms: urinary frequency no, dysuria no, noturia once a nigth  Head trauma no ECT  Testicular trauma no  Drug use oxcontin  Etoh use no  Depression yes    FH of prostate Ca no    History of neurologic disorder (spinal cord / brain injury, Parkinson, MS, stroke, major surgery) spinal surgery, foraminal narrowin  Vascular (HTN, DM, dyslipidemia, smoking, radiotherapy): yes  Medication: (Antihypertensives, antidepressants, antipsychotics, antiandrogens) yes multiple      Type 2 diabetes for several years.  Initial blood glucose above 200 repeatedly.  Patient has been able to lose about 10 pounds since Ozempic start.    Had recent eye exam, no DR  No numbness no tingling in his feet    Cardiovascular complications patient had an PE more than 10 years ago      Prior medications  Intolerant to metformin due to GI side effects  Intolerance to sulfonylureas due to hypoglycemia        Family history of type 2 diabetes in his mother      Current Problem List:   Patient Active Problem List   Diagnosis     Intermittent asthma     Hyperlipidemia LDL goal <100     Chronic nonallergic rhinitis     Diverticulosis     GERD (gastroesophageal reflux disease)     Generalized anxiety disorder     LLOYD (obstructive sleep apnea)- mild (AHI 11)     Intracranial arachnoid cyst     Facet arthritis of cervical region     Acquired hypothyroidism     Bipolar 2 disorder (H)      Chronic midline low back pain without sciatica     Irritable bowel syndrome with diarrhea     B12 deficiency     Essential hypertension with goal blood pressure less than 140/90     Chronic, continuous use of opioids     Ankylosing spondylitis of sacral region (H)     Morbid obesity (H)     Fatty infiltration of liver     DDD (degenerative disc disease), lumbar     Type 2 diabetes mellitus with complication, without long-term current use of insulin (H)     Peripheral polyneuropathy     History of pulmonary embolism     Ingrown toenail     Hypertriglyceridemia     Gastroparesis     Orthostatic dizziness     POTS (postural orthostatic tachycardia syndrome)     PHN (postherpetic neuralgia)     Dysautonomia (H)     Chronic pain syndrome     Borderline personality disorder (H)     MDD (major depressive disorder), recurrent severe, without psychosis (H)     Folliculitis     Immunosuppression (H)     Small fiber neuropathy     RBD (REM behavioral disorder)         Assessment:  Middle-aged man with multiple medical problems including morbid obesity type 2 diabetes hypothyroidism dyslipidemia POTS chronic pain syndrome on chronic pain medications including all periods.  His lab evaluation for testosterone revealed low total testosterone however the free testosterone has been in the normal range repeatedly.  Sex hormone binding globulin is decreased by obesity and therefore this pattern can be seen.  Treatment with testosterone is indicated for clearly low testosterone levels.    Type 2 diabetes patient is doing very well on Ozempic.  Patient has troubles getting Ozempic by his primary care provider and therefore would like to be followed at this clinic.    Hypothyroidism doing well on levothyroxine 75 mcg daily      Plan:   Testosterone treatment is not indicated    Continue levothyroxine 75 mcg daily    Increase Ozempic to 1 mg weekly    This note was generated using computer recognized voice recognition. This might  result in some expected imperfection.    Abbie Cuenca MD  Endocrinology and Diabetes  Telephone contact:  [facility] [place] [phone]  [facility] [place] [phone]         Past Medical and Past Surgical History:  Past Medical History:   Diagnosis Date     Acne      Acquired hypothyroidism      Allergic state      Ankylosing spondylitis lumbar region (H)      Ankylosing spondylitis of sacral region (H)      Anxiety      Bipolar 2 disorder (H)      Chest pain     Chest pain, regulated w/BP meds. Clear arteries.     Chronic pain      DDD (degenerative disc disease), lumbar      Depressive disorder      Diabetes (H)      Diverticulosis      Facet arthritis of cervical region      Gastroesophageal reflux disease      Hypertension      IBS (irritable bowel syndrome)      Intracranial arachnoid cyst      Major depressive disorder, recurrent episode (H)     Multiple psych providers - they manage meds August 2015: Provigil induced severe mood dis-function      Major depressive disorder, recurrent episode, severe (H) 12/2/2020     MDD (major depressive disorder), recurrent severe, without psychosis (H) 7/21/2021     LLOYD (obstructive sleep apnea)- mild (AHI 11)      Polyneuropathy      Pulmonary embolism (H)      Skin exam, screening for cancer 12/3/2013     Sleep apnea      Uncomplicated asthma        Past Surgical History:   Procedure Laterality Date     BACK SURGERY  10/2007    lumbar discectomy L5-S1     BIOPSY  09/15/2020    Colon Adenomas x2     COLONOSCOPY      Note: colonoscopy scheduled with [facility] on Friday, 9/4/15     COSMETIC SURGERY  2012    Nose Exterior - functional     GI SURGERY  08/2013    Sigmoidectomy     HERNIA REPAIR, UMBILICAL  08/23/2011    Dr. Evan whiting     INCISION AND DRAINAGE, ABSCESS, COMPLEX  08/23/2011    umbilical, Dr. Evan Beavers     LAPAROSCOPIC ASSISTED COLECTOMY LEFT (DESCENDING)  08/15/2013    Procedure: LAPAROSCOPIC ASSISTED  COLECTOMY LEFT (DESCENDING);  Laparoscopic Hand Assisted Sigmoid Resection, Mobilization of Splenic Fissure, coloproctoscopy, *Latex Free Room* Anesthesia General with Pain block  ;  Surgeon: Aurora Justice MD;  Location: [facility] OR     NERVE SURGERY  08/18/2011    RF ablation @ L3-S1 @ [facility]     RECONSTRUCT NOSE AND SEPTUM (FUNCTIONAL)  10/14/2011    Procedure:RECONSTRUCT NOSE AND SEPTUM (FUNCTIONAL); Functional Septorhinoplasty, Turbinate Reduction, ; Surgeon:CEDRIC CUEVAS; Location:[facility] OR     SINUS SURGERY  10/01/2001    ethmoidectomy chronic sinusitis     SOFT TISSUE SURGERY  4/7/2022    Hidradenitis Suppurativa surgery       Medications:       Current Outpatient Medications   Medication Sig Dispense Refill     acetaminophen 500 MG CAPS Take 1,000 mg by mouth 2 times daily  60 capsule      albuterol (PROAIR HFA/PROVENTIL HFA/VENTOLIN HFA) 108 (90 Base) MCG/ACT inhaler Inhale 2 puffs into the lungs every 4 hours as needed for shortness of breath / dyspnea or wheezing 8.5 g 11     aspirin (ASA) 81 MG tablet Take 81 mg by mouth daily        blood glucose (CONTOUR NEXT TEST) test strip Use to test blood sugar one to two times daily or as directed. 50 strip 11     buPROPion (WELLBUTRIN XL) 150 MG 24 hr tablet 150 mg every morning       cetirizine (ZYRTEC) 10 MG tablet Take 1 tablet (10 mg) by mouth At Bedtime 30 tablet 11     cholecalciferol (D3-50) 1250 mcg (98163 units) capsule TAKE ONE CAPSULE BY MOUTH EVERY 2 WEEKS. 24 capsule 0     clindamycin (CLINDAMAX) 1 % external gel For flares, start twice daily 60 g 3     clonazePAM (KLONOPIN) 0.5 MG tablet Take 0.5 mg by mouth 2 times daily as needed        cyanocobalamin (CYANOCOBALAMIN) 1000 MCG/ML injection INJECT 1 ML INTO THE MUSCLE EVERY 30 DAYS 10 mL 0     dronabinol (MARINOL) 2.5 MG capsule Take 2.5 mg by mouth 2 times daily (before meals)       DULoxetine (CYMBALTA) 60 MG capsule Take 120 mg by mouth daily        EPINEPHrine (ANY BX GENERIC EQUIV) 0.3 MG/0.3ML  injection 2-pack Inject 0.3 mLs (0.3 mg) into the muscle once as needed for anaphylaxis 0.6 mL 3     ergocalciferol (ERGOCALCIFEROL) 1.25 MG (72881 UT) capsule take 1 capsule by ORAL route every 2 weeks       eszopiclone (LUNESTA) 3 MG tablet Take 3 mg by mouth At Bedtime        etanercept (ENBREL SURECLICK) 50 MG/ML autoinjector Inject 50 mg Subcutaneous once a week . Hold for signs of infection, and seek medical attention. 4 mL 7     famotidine (PEPCID) 20 MG tablet Prior to administration of Humira every 2 weeks (Patient taking differently: Take 20 mg by mouth every 7 days Prior to Enbrel Injections to prevent skin reaction)       fenofibrate (TRICOR) 48 MG tablet TAKE 1 TABLET BY MOUTH ONCE DAILY 90 tablet 2     fluticasone (FLONASE) 50 MCG/ACT nasal spray Spray 2 sprays into both nostrils daily       fluticasone-salmeterol (ADVAIR) 500-50 MCG/ACT inhaler Inhale 1 puff into the lungs every 12 hours 60 each 11     fluticasone-salmeterol (ADVAIR-HFA) 230-21 MCG/ACT inhaler Inhale 2 puffs into the lungs 2 times daily for 30 days 12 g 11     levothyroxine (SYNTHROID/LEVOTHROID) 75 MCG tablet TAKE ONE TABLET BY MOUTH EVERY MORNING 90 tablet 2     lidocaine (XYLOCAINE) 5 % external ointment APPLY TOPICALLY 4 TIMES DAILY AS NEEDED FOR PAIN 50 g 1     melatonin 3 MG tablet Take 13 mg by mouth nightly as needed        methocarbamol (ROBAXIN) 500 MG tablet TAKE 1 - 2 TABLETS BY MOUTH 4 TIMES DAILY AS NEEDED FOR MUSCLE SPASMS 240 tablet 1     metoclopramide (REGLAN) 5 MG tablet Take 5 mg by mouth 4 times daily as needed       metoprolol succinate ER (TOPROL-XL) 200 MG 24 hr tablet TAKE ONE TABLET BY MOUTH TWICE DAILY 180 tablet 2     montelukast (SINGULAIR) 10 MG tablet TAKE ONE TABLET BY MOUTH EVERY EVENING 90 tablet 3     nabumetone (RELAFEN) 500 MG tablet TAKE 1-2 TABLETS BY MOUTH TWICE DAILY WITH FOOD AS NEEDED FOR MODERATE PAIN 120 tablet 0     naloxone (NARCAN) 4 MG/0.1ML nasal spray Spray 1 spray (4 mg) into one  nostril alternating nostrils once as needed for opioid reversal every 2-3 minutes until assistance arrives (Patient not taking: Reported on 9/15/2022) 0.2 mL 0     olopatadine (PATADAY) 0.2 % ophthalmic solution Place 1 drop into both eyes daily 2.5 mL 3     omega-3 acid ethyl esters (LOVAZA) 1 g capsule TAKE 2 CAPSULES BY MOUTH TWICE DAILY 360 capsule 2     omeprazole 20 MG tablet Take 20 mg by mouth daily        ondansetron (ZOFRAN-ODT) 8 MG ODT tab Take 8 mg by mouth every 8 hours as needed for nausea       order for DME Equipment being ordered: lumbosacral belt/brace 1 Units 0     order for DME Respironics REMSTAR 60 Series Auto CPAP 9-13 cm H2O, Wisp nasal mask w/a large cushion and a chinstrap       oxyCODONE (ROXICODONE) 5 MG tablet Take 1 tablet (5 mg) by mouth every 6 hours as needed for pain (maximum 6 tablet(s) per day) 35 tablet 0     pregabalin (LYRICA) 150 MG capsule TAKE ONE CAPSULE BY MOUTH THREE TIMES DAILY 270 capsule 0     pseudoePHEDrine-guaiFENesin (MUCINEX D)  MG 12 hr tablet        pyridostigmine (MESTINON) 60 MG tablet Take 1 tablet by mouth 3 times daily       ramipril (ALTACE) 10 MG capsule TAKE 1 CAPSULE BY MOUTH ONE TIME DAILY 90 capsule 2     risperiDONE (RISPERDAL) 0.5 MG tablet Take 0.5 mg by mouth 2 times daily as needed       rizatriptan (MAXALT) 10 MG tablet Take 1 tablet (10 mg) by mouth at onset of headache for migraine May repeat in 2 hours. Max 3 tablets/24 hours. 30 tablet 1     rosuvastatin (CRESTOR) 40 MG tablet TAKE ONE TABLET BY MOUTH ONCE DAILY 90 tablet 2     semaglutide (OZEMPIC, 0.25 OR 0.5 MG/DOSE,) 2 MG/1.5ML SOPN pen Inject 0.25 mg Subcutaneous every 7 days for 28 days, THEN 0.5 mg every 7 days. 4.5 mL 0     sodium fluoride 1.1 % CREA At Bedtime       spacer (OPTIC[facility] Network[facility] JOHNNY) holding chamber To be used in conjunction with albuterol (PROAIR HFA/PROVENTIL HFA/VENTOLIN HFA) 108 (90 Base) MCG/ACT inhaler 1 each 0     syringe/needle, disp, (BD [employer] SYRINGE)  "25G X 1\" 3 ML MISC USE WITH B12 INJECTIONS 12 each 0     valACYclovir (VALTREX) 500 MG tablet TAKE ONE TABLET BY MOUTH ONCE DAILY 90 tablet 0     vitamin B complex with vitamin C (STRESS TAB) tablet Take 1 tablet by mouth daily       ZINC SULFATE-VITAMIN C MT Take 1 tablet by mouth daily         Allergies:   Allergies   Allergen Reactions     Amoxicillin-Pot Clavulanate Difficulty breathing     Banana Shortness Of Breath     Pt reports organic Banana is okay.      Nitroglycerin Palpitations     Penicillins Anaphylaxis     Provigil [Modafinil] Shortness Of Breath     headache     Flu Virus Vaccine Rash     Gadolinium Hives and Itching     Patient was premedicated for the contrast allergy. He did still have a reaction a few hours after injection. Hives and itching. Dr. Gomez told tech to inform pt he should only have contrast again in the future when premedicated and at a hospital. Not at an outpatient facility.      Ketoconazole      Topical cream caused swelling and itching     Dye [Contrast Dye] Other (See Comments) and Hives     Moderate flushing, CT contrast     Gabapentin      Other reaction(s): hives     Golimumab      Hives, bradycardia, face swelling     Naproxen      Other reaction(s): Bleeding Gums     Neurontin [Gabapentin] Hives     Moderate hives     Nortriptyline Hives     Nystatin Hives     Varicella Virus Vaccine Live      Rash     Baclofen Other (See Comments)     Cognitive changes     Flagyl [Metronidazole Hcl] Palpitations and Hives     Latex Rash     Metronidazole Palpitations, Other (See Comments) and Rash     dizziness (versus ciprofloxacin taken at same time)       Social History     Tobacco Use     Smoking status: Never     Smokeless tobacco: Never   Substance Use Topics     Alcohol use: Not Currently     Comment: occ 1/week       Family History   Problem Relation Age of Onset     Musculoskeletal Disorder Mother         back     Anxiety Disorder Mother      Colon Polyps Mother      " "Ulcerative Colitis Mother         and ischemic small intestine, surgery     Hypertension Mother      Breast Cancer Mother      Osteoporosis Mother      Diabetes Mother         Type 2, Diagnosed in 2014     Depression Mother         Takes Cymbalta to help with chronic pain + depx     Thyroid Disease Mother         Hypothyroidism     Obesity Mother         Under much better control latter half of 2015     Musculoskeletal Disorder Father         back     Substance Abuse Father         Alcohol     Hypertension Father      Hyperlipidemia Father      Depression Father         Off meds for many years. Seems \"ok\"     Anxiety Disorder Father      Heart Disease Maternal Grandmother      Heart Disease Maternal Grandfather      Psychotic Disorder Paternal Grandfather      Suicide Paternal Grandfather      Depression Paternal Grandfather         Pediatrician. Committed suicide by pistol in 1990.     Musculoskeletal Disorder Brother         back     Depression Brother         Expressed as anger and moodiness     Substance Abuse Brother         Alcohol     Anxiety Disorder Brother      Substance Abuse Sister         Alcohol     Depression Sister         Mental Health Therapist, yet no anti-depressants?     Anxiety Disorder Sister         Mental Health Therapist, yet no anti-anxiety meds?     Other Cancer Other         Bladder     Substance Abuse Brother      Colon Cancer No family hx of      Crohn's Disease No family hx of      Anesthesia Reaction No family hx of      Cancer No family hx of         No family history of skin cancer       Review of Systems:  12 point review of system was negative except for above mentioned    Physical Examination:  There were no vitals taken for this visit.  There is no height or weight on file to calculate BMI.    Wt Readings from Last 4 Encounters:  10/19/22 : 140.6 kg (310 lb)  08/10/22 : 142.9 kg (315 lb)  07/11/22 : 143.8 kg (317 lb)  06/08/22 : 144.6 kg (318 lb 12.8 oz)      General: Well " appearing tall obese man  in no distress,  Eyes: EOMI. Sclerae and conjunctivae are clear.   HENT: No thyromegaly or mass.    Lymphatic: No cervical or supraclavicular lymphadenopathy.  Cardiovascular: RRR, with normal S1+S2 and no murmurs.   Skin: No rash or lesions. No abdominal striae.    Extremities: No peripheral edema.   Neurologic: No tremor with hands outstretched.      Labs and Studies:   Lab Results   Component Value Date     10/10/2022    CO2 25 10/10/2022    CHLORIDE 102 10/10/2022    CR 0.98 10/10/2022    TRIG 367 (H) 04/21/2022    HDL 38 (L) 04/21/2022    HGB 15.6 10/19/2022     Lab Results   Component Value Date    ALT 31 10/19/2022    AST 30 10/19/2022    BILITOTAL 0.5 10/19/2022    CR 0.98 10/10/2022     10/10/2022    TSH 2.59 02/10/2022    ALKPHOS 80 10/19/2022    TESTOSTTOTAL 194 (L) 08/03/2022    TESTOSTTOTAL 203 (L) 07/25/2022    TESTOSTTOTAL 354 03/19/2013    SHBGT 17 08/03/2022    SHBGT 15 07/25/2022    FT 5.12 08/03/2022    FT 5.64 07/25/2022    PSA 0.33 09/28/2015    TRIG 367 (H) 04/21/2022    TRIG 545 (H) 02/10/2022    TRIG 631 (H) 11/10/2021    LDL 44 04/21/2022    HDL 38 (L) 04/21/2022    HDL 29 (L) 02/10/2022    HDL 27 (L) 11/10/2021    PROLACTIN 25 (H) 01/19/2012     Lab Results   Component Value Date     10/10/2022    CHLORIDE 102 10/10/2022    CO2 25 10/10/2022    GLC 92 10/10/2022    CR 0.98 10/10/2022    CR 0.95 02/10/2022    CR 0.74 11/10/2021    CR 1.28 (H) 10/16/2020    CR 0.77 10/09/2020    ALYCE 10.3 (H) 10/10/2022    MAG 2.1 08/19/2013    ALBUMIN 4.4 10/19/2022    ALKPHOS 80 10/19/2022    LDL 44 04/21/2022    HDL 38 (L) 04/21/2022    TRIG 367 (H) 04/21/2022    TSH 2.59 02/10/2022    TSH 2.11 02/10/2021    TSH 2.17 01/24/2020     Lab Results   Component Value Date    MICROL 9 09/23/2022    MICROL 8 11/10/2021    MICROL 8 10/14/2020    MICROL 12 01/24/2020    MICROL <5 12/24/2018     Lab Results   Component Value Date    A1C 6.4 (H) 09/23/2022    A1C 6.3 (H)  02/10/2022    A1C 6.0 (H) 08/19/2021    A1C 6.5 (H) 02/10/2021    A1C 6.0 (H) 10/16/2020       Lab Results   Component Value Date    HGB 15.6 10/19/2022                     Answers for HPI/ROS submitted by the patient on 11/9/2022  General Symptoms: No  Skin Symptoms: Yes  HENT Symptoms: Yes  EYE SYMPTOMS: Yes  HEART SYMPTOMS: No  LUNG SYMPTOMS: Yes  INTESTINAL SYMPTOMS: Yes  URINARY SYMPTOMS: No  REPRODUCTIVE SYMPTOMS: Yes  SKELETAL SYMPTOMS: Yes  BLOOD SYMPTOMS: No  NERVOUS SYSTEM SYMPTOMS: Yes  MENTAL HEALTH SYMPTOMS: Yes  Tinnitus: Yes  Congestion: Yes   Voice hoarseness: Yes  Sore throat: Yes  Dry mouth: Yes  Acne: Yes  Dry eyes: Yes  Redness: Yes  Eye irritation: Yes  Cough: Yes  Sputum or phlegm: Yes  Nausea: Yes  Abdominal pain: Yes  Diarrhea: Yes  Erectile dysfunction: Yes  Reduced libido: Yes  Back pain: Yes  Muscle aches: Yes  Neck pain: Yes  Joint pain: Yes  Joint stiffness: Yes  Dizziness or trouble with balance: Yes  Memory loss: Yes  Headache: Yes  Tingling: Yes  Numbness: Yes  Nervous or Anxious: Yes  Depression: Yes  Trouble thinking or concentrating: Yes

## 2022-11-09 ENCOUNTER — MYC REFILL (OUTPATIENT)
Dept: FAMILY MEDICINE | Facility: CLINIC | Age: 47
End: 2022-11-09

## 2022-11-09 DIAGNOSIS — M45.8 ANKYLOSING SPONDYLITIS OF SACRAL REGION (H): ICD-10-CM

## 2022-11-09 DIAGNOSIS — M51.369 DDD (DEGENERATIVE DISC DISEASE), LUMBAR: ICD-10-CM

## 2022-11-09 RX ORDER — OXYCODONE HYDROCHLORIDE 5 MG/1
5 TABLET ORAL EVERY 6 HOURS PRN
Qty: 35 TABLET | Refills: 0 | Status: SHIPPED | OUTPATIENT
Start: 2022-11-09 | End: 2022-11-30

## 2022-11-09 ASSESSMENT — ENCOUNTER SYMPTOMS
NERVOUS/ANXIOUS: 1
DIARRHEA: 1
HEADACHES: 1
EYE REDNESS: 1
MYALGIAS: 1
TINGLING: 1
DECREASED CONCENTRATION: 1
SINUS CONGESTION: 1
NAUSEA: 1
ABDOMINAL PAIN: 1
NECK PAIN: 1
BACK PAIN: 1
STIFFNESS: 1
DEPRESSION: 1
SPUTUM PRODUCTION: 1
DIZZINESS: 1
EYE IRRITATION: 1
MEMORY LOSS: 1
SORE THROAT: 1
HOARSE VOICE: 1
NUMBNESS: 1
COUGH: 1
ARTHRALGIAS: 1

## 2022-11-09 ASSESSMENT — ANXIETY QUESTIONNAIRES
GAD7 TOTAL SCORE: 7
7. FEELING AFRAID AS IF SOMETHING AWFUL MIGHT HAPPEN: MORE THAN HALF THE DAYS
GAD7 TOTAL SCORE: 7
3. WORRYING TOO MUCH ABOUT DIFFERENT THINGS: SEVERAL DAYS
IF YOU CHECKED OFF ANY PROBLEMS ON THIS QUESTIONNAIRE, HOW DIFFICULT HAVE THESE PROBLEMS MADE IT FOR YOU TO DO YOUR WORK, TAKE CARE OF THINGS AT HOME, OR GET ALONG WITH OTHER PEOPLE: SOMEWHAT DIFFICULT
6. BECOMING EASILY ANNOYED OR IRRITABLE: SEVERAL DAYS
2. NOT BEING ABLE TO STOP OR CONTROL WORRYING: SEVERAL DAYS
4. TROUBLE RELAXING: SEVERAL DAYS
5. BEING SO RESTLESS THAT IT IS HARD TO SIT STILL: NOT AT ALL
1. FEELING NERVOUS, ANXIOUS, OR ON EDGE: SEVERAL DAYS
8. IF YOU CHECKED OFF ANY PROBLEMS, HOW DIFFICULT HAVE THESE MADE IT FOR YOU TO DO YOUR WORK, TAKE CARE OF THINGS AT HOME, OR GET ALONG WITH OTHER PEOPLE?: SOMEWHAT DIFFICULT
7. FEELING AFRAID AS IF SOMETHING AWFUL MIGHT HAPPEN: MORE THAN HALF THE DAYS

## 2022-11-09 ASSESSMENT — PAIN SCALES - PAIN ENJOYMENT GENERAL ACTIVITY SCALE (PEG)
INTERFERED_GENERAL_ACTIVITY: 7
INTERFERED_ENJOYMENT_LIFE: 7
PEG_TOTALSCORE: 6.33
AVG_PAIN_PASTWEEK: 5
PEG_TOTALSCORE: 6.33
AVG_PAIN_PASTWEEK: 5
INTERFERED_GENERAL_ACTIVITY: 7
INTERFERED_ENJOYMENT_LIFE: 7

## 2022-11-10 ENCOUNTER — E-VISIT (OUTPATIENT)
Dept: FAMILY MEDICINE | Facility: CLINIC | Age: 47
End: 2022-11-10

## 2022-11-10 ENCOUNTER — OFFICE VISIT (OUTPATIENT)
Dept: DERMATOLOGY | Facility: CLINIC | Age: 47
End: 2022-11-10
Payer: MEDICARE

## 2022-11-10 ENCOUNTER — OFFICE VISIT (OUTPATIENT)
Dept: PALLIATIVE MEDICINE | Facility: CLINIC | Age: 47
End: 2022-11-10
Payer: MEDICARE

## 2022-11-10 VITALS
SYSTOLIC BLOOD PRESSURE: 121 MMHG | WEIGHT: 306 LBS | HEART RATE: 77 BPM | DIASTOLIC BLOOD PRESSURE: 86 MMHG | BODY MASS INDEX: 35.36 KG/M2

## 2022-11-10 DIAGNOSIS — G89.4 CHRONIC PAIN SYNDROME: Primary | ICD-10-CM

## 2022-11-10 DIAGNOSIS — M51.369 DDD (DEGENERATIVE DISC DISEASE), LUMBAR: ICD-10-CM

## 2022-11-10 DIAGNOSIS — L73.2 HIDRADENITIS SUPPURATIVA: Primary | ICD-10-CM

## 2022-11-10 DIAGNOSIS — R10.9 ABDOMINAL PAIN, UNSPECIFIED ABDOMINAL LOCATION: Primary | ICD-10-CM

## 2022-11-10 DIAGNOSIS — M62.838 MUSCLE SPASM: ICD-10-CM

## 2022-11-10 DIAGNOSIS — M79.18 MYOFASCIAL MUSCLE PAIN: ICD-10-CM

## 2022-11-10 PROCEDURE — 99213 OFFICE O/P EST LOW 20 MIN: CPT | Performed by: NURSE PRACTITIONER

## 2022-11-10 PROCEDURE — 99207 PR NON-BILLABLE SERV PER CHARTING: CPT | Performed by: PHYSICIAN ASSISTANT

## 2022-11-10 PROCEDURE — 99212 OFFICE O/P EST SF 10 MIN: CPT | Performed by: DERMATOLOGY

## 2022-11-10 RX ORDER — METHOCARBAMOL 500 MG/1
TABLET, FILM COATED ORAL
Qty: 240 TABLET | Refills: 1 | Status: SHIPPED | OUTPATIENT
Start: 2022-11-10 | End: 2023-01-30

## 2022-11-10 ASSESSMENT — PAIN SCALES - GENERAL
PAINLEVEL: NO PAIN (0)
PAINLEVEL: MILD PAIN (3)

## 2022-11-10 NOTE — PROGRESS NOTES
[facility] Dermatology Note  Encounter Date: Nov 10, 2022  Office Visit     Dermatology Problem List:  1. Immunosuppresion for ankylosing spondylitis, on Enbrel.  2. Acne vulgaris s/p Accutane in 1990's  3. Folliculitis  - BPO wash, Hibiclens, clobetasol for flares  4. HS - active lesion on the left buttock, excision 4/7/22  - doxy 100mg BID x 3 months (initiated 2/22/22); BPO wash/Hibiclens; topical clindamycin  5. Repigmented nevus - lower back - Will obtained record - Recheck with Dr. Tilley  6. Cyst - left chest pending excision with Dr. Tilley    ____________________________________________    Assessment & Plan:    # HS, s/p excision on the left buttock 4/7/22. Surgical wound appears to be healing appropriately. No evidence of rashes or HS that could be causing surrounding pain. At this time, he is agreeable to a trial of topical lidocaine.   - Apply topical lidocaine TID to the affected area.   - RTC if not improved in the next 4-6 weeks.      Procedures Performed:   None.    Follow-up: 2-4 weeks virtually    Staff and Scribe:     Scribe Disclosure:   I, Gera Bustos, am serving as a scribe to document services personally performed by this physician, Dr. Drake Tilley, based on data collection and the provider's statements to me.     Provider Disclosure:   The documentation recorded by the scribe accurately reflects the services I personally performed and the decisions made by me.    Drake Tilley DO    Department of Dermatology  [facility]: Phone: [phone], Fax:[phone]  [facility]: Phone: [phone], Fax: [phone]    ____________________________________________    CC: Wound Check (Intermittent pain to HS excision site )    HPI:  Mr. Joel Pineda is a(n) 47 year old male who presents today as a return patient for a wound check.    Today, he reports  having a sensation of pain in the area of his scar from his HS which has been ongoing for 6 weeks. He is unable to feel anything in the area indicating this is an HS flare up. This pain can occur at anytime and is typically between the wound and the rectum. This pain typically lasts for a few seconds, is a stabbing pain and resolves on it's own rather quickly. This pain can occur at rest and does not have a correlation with his bowel movements. He has not tried any treatments, but has continued doing bleach baths and using Hibiclens as prescribed. He has noticed this pain becoming less frequent since it first began. He has had some difficulty isolating where the pain is exactly in the scar.    Patient is otherwise feeling well, without additional skin concerns.    Labs Reviewed:  N/A    Physical Exam:  Vitals: There were no vitals taken for this visit.  SKIN: Focused examination of left buttock was performed.  - Atrophic ovoid plaque at site of prior surgery, perianal skin without vessicles, abcess or sinus tract   - No other lesions of concern on areas examined.     Medications:  Current Outpatient Medications   Medication     acetaminophen 500 MG CAPS     albuterol (PROAIR HFA/PROVENTIL HFA/VENTOLIN HFA) 108 (90 Base) MCG/ACT inhaler     aspirin (ASA) 81 MG tablet     benzoyl peroxide 10 % BAR     blood glucose (CONTOUR NEXT TEST) test strip     buPROPion (WELLBUTRIN XL) 150 MG 24 hr tablet     cetirizine (ZYRTEC) 10 MG tablet     cholecalciferol (D3-50) 1250 mcg (19797 units) capsule     clonazePAM (KLONOPIN) 0.5 MG tablet     cyanocobalamin (CYANOCOBALAMIN) 1000 MCG/ML injection     dronabinol (MARINOL) 2.5 MG capsule     DULoxetine (CYMBALTA) 60 MG capsule     EPINEPHrine (ANY BX GENERIC EQUIV) 0.3 MG/0.3ML injection 2-pack     ergocalciferol (ERGOCALCIFEROL) 1.25 MG (43772 UT) capsule     eszopiclone (LUNESTA) 3 MG tablet     etanercept (ENBREL SURECLICK) 50 MG/ML autoinjector     famotidine (PEPCID) 20 MG  "tablet     fenofibrate (TRICOR) 48 MG tablet     fluticasone (FLONASE) 50 MCG/ACT nasal spray     fluticasone-salmeterol (ADVAIR) 500-50 MCG/ACT inhaler     fluticasone-salmeterol (ADVAIR-HFA) 230-21 MCG/ACT inhaler     levothyroxine (SYNTHROID/LEVOTHROID) 75 MCG tablet     lidocaine (XYLOCAINE) 5 % external ointment     melatonin 3 MG tablet     methocarbamol (ROBAXIN) 500 MG tablet     metoclopramide (REGLAN) 5 MG tablet     metoprolol succinate ER (TOPROL-XL) 200 MG 24 hr tablet     montelukast (SINGULAIR) 10 MG tablet     nabumetone (RELAFEN) 500 MG tablet     naloxone (NARCAN) 4 MG/0.1ML nasal spray     olopatadine (PATADAY) 0.2 % ophthalmic solution     omega-3 acid ethyl esters (LOVAZA) 1 g capsule     omeprazole 20 MG tablet     ondansetron (ZOFRAN-ODT) 8 MG ODT tab     order for DME     order for DME     oxyCODONE (ROXICODONE) 5 MG tablet     pregabalin (LYRICA) 150 MG capsule     pseudoePHEDrine-guaiFENesin (MUCINEX D)  MG 12 hr tablet     pyridostigmine (MESTINON) 60 MG tablet     ramipril (ALTACE) 10 MG capsule     risperiDONE (RISPERDAL) 0.5 MG tablet     rizatriptan (MAXALT) 10 MG tablet     rosuvastatin (CRESTOR) 40 MG tablet     semaglutide (OZEMPIC, 0.25 OR 0.5 MG/DOSE,) 2 MG/1.5ML SOPN pen     sodium fluoride 1.1 % CREA     spacer (OPTICHAMBER JOHNNY) holding chamber     syringe/needle, disp, (BD INTEGRA SYRINGE) 25G X 1\" 3 ML MISC     valACYclovir (VALTREX) 500 MG tablet     vitamin B complex with vitamin C (STRESS TAB) tablet     ZINC SULFATE-VITAMIN C MT     No current facility-administered medications for this visit.      Past Medical History:   Patient Active Problem List   Diagnosis     Intermittent asthma     Hyperlipidemia LDL goal <100     Chronic nonallergic rhinitis     Diverticulosis     GERD (gastroesophageal reflux disease)     Generalized anxiety disorder     LLOYD (obstructive sleep apnea)- mild (AHI 11)     Intracranial arachnoid cyst     Facet arthritis of cervical region     " Acquired hypothyroidism     Bipolar 2 disorder (H)     Chronic midline low back pain without sciatica     Irritable bowel syndrome with diarrhea     B12 deficiency     Essential hypertension with goal blood pressure less than 140/90     Chronic, continuous use of opioids     Ankylosing spondylitis of sacral region (H)     Morbid obesity (H)     Fatty infiltration of liver     DDD (degenerative disc disease), lumbar     Type 2 diabetes mellitus with complication, without long-term current use of insulin (H)     Peripheral polyneuropathy     History of pulmonary embolism     Ingrown toenail     Hypertriglyceridemia     Gastroparesis     Orthostatic dizziness     POTS (postural orthostatic tachycardia syndrome)     PHN (postherpetic neuralgia)     Dysautonomia (H)     Chronic pain syndrome     Borderline personality disorder (H)     MDD (major depressive disorder), recurrent severe, without psychosis (H)     Folliculitis     Immunosuppression (H)     Small fiber neuropathy     RBD (REM behavioral disorder)     Past Medical History:   Diagnosis Date     Acne      Acquired hypothyroidism      Allergic state      Ankylosing spondylitis lumbar region (H)      Ankylosing spondylitis of sacral region (H)      Anxiety      Bipolar 2 disorder (H)      Chest pain     Chest pain, regulated w/BP meds. Clear arteries.     Chronic pain      DDD (degenerative disc disease), lumbar      Depressive disorder      Diabetes (H)      Diverticulosis      Facet arthritis of cervical region      Gastroesophageal reflux disease      Hypertension      IBS (irritable bowel syndrome)      Intracranial arachnoid cyst      Major depressive disorder, recurrent episode (H)     Multiple psych providers - they manage meds August 2015: Provigil induced severe mood dis-function      Major depressive disorder, recurrent episode, severe (H) 12/2/2020     MDD (major depressive disorder), recurrent severe, without psychosis (H) 7/21/2021     LLOYD  (obstructive sleep apnea)- mild (AHI 11)      Polyneuropathy      Pulmonary embolism (H)      Skin exam, screening for cancer 12/3/2013     Sleep apnea      Uncomplicated asthma         CC No referring provider defined for this encounter. on close of this encounter.

## 2022-11-10 NOTE — PATIENT INSTRUCTIONS
After Visit Instructions:     Thank you for coming to [facility] for your care. It is my goal to partner with you to help you reach your optimal state of health.   Continue daily self-care, identifying contributing factors, and monitoring variations in pain level. Continue to integrate self-care into your life.      30 minutes Video or Clinic follow-up with JOCELYN Zavala NP-C in 2 months .   Procedures recommended:   We will call you about scheduling the RFA. If you don't hear by early next week, call the clinic at [phone]  Trigger point injections  Medication Management : Methocarbamol refilled     JOCELYN Padgett NP-C  [facility] - Monday and Friday  [place] ([facility]) - Tuesday  [place] - Thursday    Be sure to request ALL medication refills 5 days prior to the due date unless you will see your medical provider in an appointment before the due date.  This is YOUR responsibility. Do not expect same day refills. If you do not plan ahead you may run out of medications.   NO early refills are allowed. It is your responsibility to manage your medications responsibility and keep them safely stored. Lost or destroyed medications WILL NOT be replaced    Scheduling/Clinic telephone Number for ALL locations:  [phone]    After Hours On-Call Service:  [phone]    Call with any questions about your care and for scheduling assistance.   Calls are returned Monday through Friday between 8 AM and 4:00 PM. We usually get back to you within 2 business days depending on the issue/request.    If we are prescribing your medications:  For opioid medication refills, call the clinic or send a bitHound message 7 days in advance.  Please include:  Your name and date of birth.   Name of requested medication  Name of the pharmacy.  For non-opioid medications, call your pharmacy directly to request a refill. Please allow 3-4 days to be processed.   Per [place]  State Law:  All controlled substance prescriptions must be filled within 30 days of being written.    For those controlled substances allowing refills, pickup must occur within 30 days of last fill.      We believe regular attendance is key to your success in our program!    Any time you are unable to keep your appointment we ask that you call us at least 24 hours in advance to cancel.This will allow us to offer the appointment time to another patient.   Multiple missed appointments may lead to dismissal from the clinic.

## 2022-11-10 NOTE — PATIENT INSTRUCTIONS
Apply topical lidocaine three times daily. If this leads to improvement in how often your pain is occurring, you could reduce the amount of times you use this to twice daily or once daily to make applications of lidocaine as minimal as possible. If there is no improvement, we could consider other options.

## 2022-11-10 NOTE — LETTER
11/10/2022         RE: Joel Pineda  [id number] [address] [place] [address]        Dear Colleague,    Thank you for referring your patient, Joel Pineda, to the [facility]. Please see a copy of my visit note below.    [facility] Dermatology Note  Encounter Date: Nov 10, 2022  Office Visit     Dermatology Problem List:  1. Immunosuppresion for ankylosing spondylitis, on Enbrel.  2. Acne vulgaris s/p Accutane in 1990's  3. Folliculitis  - BPO wash, Hibiclens, clobetasol for flares  4. HS - active lesion on the left buttock, excision 4/7/22  - doxy 100mg BID x 3 months (initiated 2/22/22); BPO wash/Hibiclens; topical clindamycin  5. Repigmented nevus - lower back - Will obtained record - Recheck with Dr. Tilley  6. Cyst - left chest pending excision with Dr. Tilley    ____________________________________________    Assessment & Plan:    # HS, s/p excision on the left buttock 4/7/22. Surgical wound appears to be healing appropriately. No evidence of rashes or HS that could be causing surrounding pain. At this time, he is agreeable to a trial of topical lidocaine.   - Apply topical lidocaine TID to the affected area.   - RTC if not improved in the next 4-6 weeks.      Procedures Performed:   None.    Follow-up: 2-4 weeks virtually    Staff and Scribe:     Scribe Disclosure:   I, Gera Bustos, am serving as a scribe to document services personally performed by this physician, Dr. Drake Tilley, based on data collection and the provider's statements to me.     Provider Disclosure:   The documentation recorded by the scribe accurately reflects the services I personally performed and the decisions made by me.    Drake Tilley DO    Department of Dermatology  [facility]: Phone: [phone], Fax:[phone]  [facility]: Phone: [phone],  Fax: [phone]    ____________________________________________    CC: Wound Check (Intermittent pain to HS excision site )    HPI:  Mr. Joel Pienda is a(n) 47 year old male who presents today as a return patient for a wound check.    Today, he reports having a sensation of pain in the area of his scar from his HS which has been ongoing for 6 weeks. He is unable to feel anything in the area indicating this is an HS flare up. This pain can occur at anytime and is typically between the wound and the rectum. This pain typically lasts for a few seconds, is a stabbing pain and resolves on it's own rather quickly. This pain can occur at rest and does not have a correlation with his bowel movements. He has not tried any treatments, but has continued doing bleach baths and using Hibiclens as prescribed. He has noticed this pain becoming less frequent since it first began. He has had some difficulty isolating where the pain is exactly in the scar.    Patient is otherwise feeling well, without additional skin concerns.    Labs Reviewed:  N/A    Physical Exam:  Vitals: There were no vitals taken for this visit.  SKIN: Focused examination of left buttock was performed.  - Atrophic ovoid plaque at site of prior surgery, perianal skin without vessicles, abcess or sinus tract   - No other lesions of concern on areas examined.     Medications:  Current Outpatient Medications   Medication     acetaminophen 500 MG CAPS     albuterol (PROAIR HFA/PROVENTIL HFA/VENTOLIN HFA) 108 (90 Base) MCG/ACT inhaler     aspirin (ASA) 81 MG tablet     benzoyl peroxide 10 % BAR     blood glucose (CONTOUR NEXT TEST) test strip     buPROPion (WELLBUTRIN XL) 150 MG 24 hr tablet     cetirizine (ZYRTEC) 10 MG tablet     cholecalciferol (D3-50) 1250 mcg (54899 units) capsule     clonazePAM (KLONOPIN) 0.5 MG tablet     cyanocobalamin (CYANOCOBALAMIN) 1000 MCG/ML injection     dronabinol (MARINOL) 2.5 MG capsule     DULoxetine (CYMBALTA) 60 MG capsule  "    EPINEPHrine (ANY BX GENERIC EQUIV) 0.3 MG/0.3ML injection 2-pack     ergocalciferol (ERGOCALCIFEROL) 1.25 MG (41026 UT) capsule     eszopiclone (LUNESTA) 3 MG tablet     etanercept (ENBREL SURECLICK) 50 MG/ML autoinjector     famotidine (PEPCID) 20 MG tablet     fenofibrate (TRICOR) 48 MG tablet     fluticasone (FLONASE) 50 MCG/ACT nasal spray     fluticasone-salmeterol (ADVAIR) 500-50 MCG/ACT inhaler     fluticasone-salmeterol (ADVAIR-HFA) 230-21 MCG/ACT inhaler     levothyroxine (SYNTHROID/LEVOTHROID) 75 MCG tablet     lidocaine (XYLOCAINE) 5 % external ointment     melatonin 3 MG tablet     methocarbamol (ROBAXIN) 500 MG tablet     metoclopramide (REGLAN) 5 MG tablet     metoprolol succinate ER (TOPROL-XL) 200 MG 24 hr tablet     montelukast (SINGULAIR) 10 MG tablet     nabumetone (RELAFEN) 500 MG tablet     naloxone (NARCAN) 4 MG/0.1ML nasal spray     olopatadine (PATADAY) 0.2 % ophthalmic solution     omega-3 acid ethyl esters (LOVAZA) 1 g capsule     omeprazole 20 MG tablet     ondansetron (ZOFRAN-ODT) 8 MG ODT tab     order for DME     order for DME     oxyCODONE (ROXICODONE) 5 MG tablet     pregabalin (LYRICA) 150 MG capsule     pseudoePHEDrine-guaiFENesin (MUCINEX D)  MG 12 hr tablet     pyridostigmine (MESTINON) 60 MG tablet     ramipril (ALTACE) 10 MG capsule     risperiDONE (RISPERDAL) 0.5 MG tablet     rizatriptan (MAXALT) 10 MG tablet     rosuvastatin (CRESTOR) 40 MG tablet     semaglutide (OZEMPIC, 0.25 OR 0.5 MG/DOSE,) 2 MG/1.5ML SOPN pen     sodium fluoride 1.1 % CREA     spacer (OPTICHAMBER JOHNNY) holding chamber     syringe/needle, disp, (BD INTEGRA SYRINGE) 25G X 1\" 3 ML MISC     valACYclovir (VALTREX) 500 MG tablet     vitamin B complex with vitamin C (STRESS TAB) tablet     ZINC SULFATE-VITAMIN C MT     No current facility-administered medications for this visit.      Past Medical History:   Patient Active Problem List   Diagnosis     Intermittent asthma     Hyperlipidemia LDL goal " <100     Chronic nonallergic rhinitis     Diverticulosis     GERD (gastroesophageal reflux disease)     Generalized anxiety disorder     LLOYD (obstructive sleep apnea)- mild (AHI 11)     Intracranial arachnoid cyst     Facet arthritis of cervical region     Acquired hypothyroidism     Bipolar 2 disorder (H)     Chronic midline low back pain without sciatica     Irritable bowel syndrome with diarrhea     B12 deficiency     Essential hypertension with goal blood pressure less than 140/90     Chronic, continuous use of opioids     Ankylosing spondylitis of sacral region (H)     Morbid obesity (H)     Fatty infiltration of liver     DDD (degenerative disc disease), lumbar     Type 2 diabetes mellitus with complication, without long-term current use of insulin (H)     Peripheral polyneuropathy     History of pulmonary embolism     Ingrown toenail     Hypertriglyceridemia     Gastroparesis     Orthostatic dizziness     POTS (postural orthostatic tachycardia syndrome)     PHN (postherpetic neuralgia)     Dysautonomia (H)     Chronic pain syndrome     Borderline personality disorder (H)     MDD (major depressive disorder), recurrent severe, without psychosis (H)     Folliculitis     Immunosuppression (H)     Small fiber neuropathy     RBD (REM behavioral disorder)     Past Medical History:   Diagnosis Date     Acne      Acquired hypothyroidism      Allergic state      Ankylosing spondylitis lumbar region (H)      Ankylosing spondylitis of sacral region (H)      Anxiety      Bipolar 2 disorder (H)      Chest pain     Chest pain, regulated w/BP meds. Clear arteries.     Chronic pain      DDD (degenerative disc disease), lumbar      Depressive disorder      Diabetes (H)      Diverticulosis      Facet arthritis of cervical region      Gastroesophageal reflux disease      Hypertension      IBS (irritable bowel syndrome)      Intracranial arachnoid cyst      Major depressive disorder, recurrent episode (H)     Multiple psych  providers - they manage meds August 2015: Provigil induced severe mood dis-function      Major depressive disorder, recurrent episode, severe (H) 12/2/2020     MDD (major depressive disorder), recurrent severe, without psychosis (H) 7/21/2021     LLOYD (obstructive sleep apnea)- mild (AHI 11)      Polyneuropathy      Pulmonary embolism (H)      Skin exam, screening for cancer 12/3/2013     Sleep apnea      Uncomplicated asthma         CC No referring provider defined for this encounter. on close of this encounter.      Again, thank you for allowing me to participate in the care of your patient.        Sincerely,        Drake Tilley MD

## 2022-11-10 NOTE — PROGRESS NOTES
[facility]    CHIEF COMPLAINT: Chronic Pain.    INTERVAL HISTORY:  Last seen on 9/15/22.       Recommendations/plan at the last visit included:  1. Physical therapy: YES continue per your physical therapist.   1. Look into yoga for back pain, seated yoga, gentle yoga, Yoga with Samara.   2. 30 minutes Video or Clinic follow-up with JOCELYN Zavala NP-C in 2 months or sooner as needed. .   3. Medication Management : Ask Dr Baxter about daily prednisone.     Since last visit:   - 11/4/22 in ED with abdominal pain. Intermittent severe left flank pain, prescribed two antibiotics, was allergic to one of them and had to use his epi-pen.   - Ozempic is approved for weight loss but the patient assistance program will only send it is it can be sent to a medical clinic. His PCP who prescribes it cannot due it.     Pain Information today: Mild Pain (3)/10. Location of pain: low back, feels like facet joint issues. .    Annual Requirements last collected:  N/A     Current Pain Relevant Medications:    Acetaminophen 500 mg BID & with Oxycodone.   Cymbalta 120 mg in AM  Lyrica 150 mg BID  Methocarbamol 500 mg 1-2 QID PRN  Nabumetone 500 mg 1-2 times a day  Lidocaine gel topically 2-3 times daily     Pain Related Controlled Substances:   Clonazepam 0.5 mg at HS. Occasionally 1 tab during the day.  Lunesta 3 mg  Oxycodone 5 mg 1-2 tabs per day PRN              Total opiate dose: 7.5-15 MME     Previous Pain Relevant Medications: (H--helped; HI--Helped initially; [facility]--Somewhat helpful; NH--No help; W--worse; SE--side effects; ?--Unsure if helpful)   NOTE: This medication information taken from patient's intake form, not medical records.               Opiates: Oxycodone: H, Tramadol:[facility]. SE, Codeine: NH              NSAIDS: Celebrex: [facility], Nabumetone:H, Naproxen: SE              Anti-migraine medications: Midrin? , Maxalt:H              Muscle Relaxants: Baclofen:[facility], Flexeril:H, Tizaidine:?,  Methocarbamol:?              Neuropathics: Lyrica:H  Anti-depressants: Abilify:SE, Brintellix: NH, Wellbutrin: ?, Cymbalta: NH, Nortriptyline: NH, Seroquel:   [facility], Risperdal: NH, Effexor:?, Cymbalta ?              Anxiety medications: Xanax: H, Klonpin: H, lorazepam: H                  Topicals: Lidocaine:H              Sleep medications:Belsomra: NH, Melatonin:H, Trazodone NH, Ambien:NH              Other medications not covered above: Humira: All, Enbrel; H, dicyclomine:H, Medrol:[facility], Prednisone:H  This will be added at a later date when new patient packet is available.      Any illicit drug use: denies   EtOH use: none   Caffeine use: 6-8 per day   Nicotine use: None     Past Pain Treatments:               Pain Clinic:   Yes    [facility] pain management: For spinal injections with Dr Diaz, [facility]: injections, nerve ablation, [facility] for SCS treatment.  Did trial but did not find it beneficial.   [facility] chronic pain program.                   PT: Yes  For other reasons. Neck, back and feet              Psychologist: Yes ongoing with private therapist.at  [facility].               Chiropractor: Yes years ago              Acupuncture: Yes [facility]              Pharmacotherapy:                           Opioids: Yes                            Non-opioids:    Yes               TENs Unit:Yes H for back               Injections: Yes Many for neck and back               Surgeries related to pain: Yes               October 2007 L5-S1 laminectomy, micro discectomy          THE 4 As OF OPIOID MAINTENANCE ANALGESIA    Analgesia: Is pain relief clinically significant? YES   Activity: Is patient functional and able to perform Activities of Daily Living? YES   Adverse effects: Is patient free from adverse side effects from opiates? YES   Adherence to Rx protocol: Is patient adhering to Controlled Substance Agreement and taking medications ONLY as ordered? YES     Is Narcan prescribed for opiate use >50 MME daily or concurrent  use of opiates and benzodiazepines?  Yes prescribed by this writer 8/10/2020       [place] Board of Pharmacy Data Base Reviewed:    YES; No concern for abuse or misuse of controlled medications based on this report. Reviewed [facility] November 9, 2022- no concerning fills.      PHYSICAL EXAM    Vitals:    11/10/22 1124   BP: 121/86   Pulse: 77   Weight: 138.8 kg (306 lb)       Constitutional: healthy, alert and no distress A&O.   Patient is appropriate.  Psychiatric/mental status: Alert, without lethargy or stupor. Appropriate affect.     DIRE Score for ongoing opioid management is calculated as follows:    Diagnosis = 3 pts (advanced condition; severe pain/objective findings)    Intractability = 3 pts (patient fully engaged but inadequate response to treatments)    Risk        Psych = 3 pts (no significant personality dysfunction/mental illness; good communication with clinic)         Chem Hlth = 3 pts (no history of chemical dependency; not drug-focused)       Reliability = 2 pts (occasional difficulties with compliance; generally reliable)       Social = 2 pts (reduction in some relationships/life rolls)       (Psych + Chem hlth + Reliability + Social) = 16    Efficacy = 2 pts (moderate benefit/function; low med dose; too early/not tried meds)    DIRE Score = 18        7-13: likely NOT suitable candidate for long-term opioid analgesia       14-21: may be a suitable candidate for long-term opioid analgesia     Previous Diagnostic Tests:   Imaging Studies:   MR LUMBAR SPINE W/O CONTRAST 6/27/2019  Impression:   1. Multilevel lumbar spondylosis, most pronounced at L5-S1 with  moderate to severe left and moderate right neural foraminal stenosis as well as narrowing of the left lateral recess and abutment the traversing left S1 nerve root.   2. Additional multilevel degenerative changes of the lumbar spine as described above.     PAIN RELEVANT CONDITIONS:   1.  Postherpetic neuralgia  2.  Ankylosing spondylosis, Lumbar  DDD with left S1 nerve involvement, lumbar stenosis  3.  Chronic migraine headaches  4.  History: Complex medical issues, see history    DIAGNOSIS AND PLAN:     (G89.4) Chronic pain syndrome  (primary encounter diagnosis)  (M79.18) Myofascial muscle pain  (M62.838) Muscle spasm  (M51.36) DDD (degenerative disc disease), lumbar  Comment: Continue current POC  Plan: PAIN INJECTION EVAL/TREAT/FOLLOW UP  Plan: methocarbamol (ROBAXIN) 500 MG tablet         PATIENT INSTRUCTIONS:     Diagnosis reviewed, treatment option addressed, and risk/benefits discussed.  Self-care instructions given.  I am recommending a multidisciplinary treatment plan to help this patient better manage pain.    Remember to request ALL medication refills 5 BUSINESS days before you run out.     1. 30 minutes Video or Clinic follow-up with JOCELYN Zavala, NP-C in 2 months .   2. Procedures recommended:   1. We will call you about scheduling the RFA. If you don't hear by early next week, call the clinic at [phone]  2. Trigger point injections  3. Medication Management : Methocarbamol refilled     I have reviewed the note as documented above.  This accurately captures the substance of my conversation with the patient.  A total of 23 minutes of preparation, care, and consultation were spent on this visit today.     JOCELYN Padgett, NP-C  [facility]    (Information in italics and blue color are taken from previous pain and consulting medical providers notes and are documented as such)

## 2022-11-10 NOTE — NURSING NOTE
Joel Pineda's goals for this visit include:   Chief Complaint   Patient presents with     Wound Check     Intermittent pain to HS excision site        He requests these members of his care team be copied on today's visit information:     PCP: Ksenia Lyles    Referring Provider:  No referring provider defined for this encounter.    There were no vitals taken for this visit.    Do you need any medication refills at today's visit? Virginia Blankenship LPN

## 2022-11-11 ENCOUNTER — OFFICE VISIT (OUTPATIENT)
Dept: PEDIATRICS | Facility: CLINIC | Age: 47
End: 2022-11-11
Attending: PHYSICIAN ASSISTANT
Payer: MEDICARE

## 2022-11-11 ENCOUNTER — OFFICE VISIT (OUTPATIENT)
Dept: ENDOCRINOLOGY | Facility: CLINIC | Age: 47
End: 2022-11-11
Attending: UROLOGY
Payer: MEDICARE

## 2022-11-11 VITALS
OXYGEN SATURATION: 99 % | DIASTOLIC BLOOD PRESSURE: 83 MMHG | SYSTOLIC BLOOD PRESSURE: 127 MMHG | RESPIRATION RATE: 16 BRPM | TEMPERATURE: 97.4 F | HEART RATE: 78 BPM

## 2022-11-11 VITALS
HEIGHT: 78 IN | HEART RATE: 76 BPM | BODY MASS INDEX: 35.36 KG/M2 | OXYGEN SATURATION: 96 % | WEIGHT: 305.6 LBS | DIASTOLIC BLOOD PRESSURE: 84 MMHG | SYSTOLIC BLOOD PRESSURE: 123 MMHG

## 2022-11-11 DIAGNOSIS — K57.32 DIVERTICULITIS OF COLON: Primary | ICD-10-CM

## 2022-11-11 DIAGNOSIS — R10.9 ABDOMINAL PAIN, UNSPECIFIED ABDOMINAL LOCATION: ICD-10-CM

## 2022-11-11 DIAGNOSIS — E29.1 HYPOGONADISM MALE: ICD-10-CM

## 2022-11-11 PROCEDURE — 99214 OFFICE O/P EST MOD 30 MIN: CPT | Performed by: FAMILY MEDICINE

## 2022-11-11 PROCEDURE — 99204 OFFICE O/P NEW MOD 45 MIN: CPT | Performed by: INTERNAL MEDICINE

## 2022-11-11 RX ORDER — MOXIFLOXACIN HYDROCHLORIDE 400 MG/1
400 TABLET ORAL DAILY
Qty: 10 TABLET | Refills: 0 | Status: SHIPPED | OUTPATIENT
Start: 2022-11-11 | End: 2023-01-24

## 2022-11-11 RX ORDER — MOXIFLOXACIN HYDROCHLORIDE 400 MG/1
400 TABLET ORAL DAILY
Qty: 10 TABLET | Refills: 0 | Status: SHIPPED | OUTPATIENT
Start: 2022-11-11 | End: 2022-11-11

## 2022-11-11 NOTE — NURSING NOTE
"Joel Pineda's goals for this visit include:   Chief Complaint   Patient presents with     Consult     Endocrine Problem     Low testosterone     Diabetes     Thyroid Disease     He requests these members of his care team be copied on today's visit information: Yes    PCP: Ksenia Lyles    Referring Provider:  Yoan Khoury MD  [address] [facility] [address]  [place] [address]    /84 (BP Location: Left arm, Patient Position: Sitting, Cuff Size: Adult Large)   Pulse 76   Ht 1.981 m (6' 6\")   Wt 138.6 kg (305 lb 9.6 oz)   SpO2 96%   BMI 35.32 kg/m      Do you need any medication refills at today's visit? No    "

## 2022-11-11 NOTE — PROGRESS NOTES
Assessment & Plan     Diverticulitis of colon: initially diagnosed with diverticulitis, started improving with flagyl/ceftin but discontinued flagyl on day 3 and then symptoms returned a few days after stopping flagyl. Symptoms are now the same as in the ER and likely incomplete treatment. He has no peritoneal signs or fever so we will hold off on additional imaging and treat with moxifloxacin (he has numerous allergies). Return to clear fluids and slow return of normal diet. If worsening, will go in to the ER over the weekend. Discussed concerning symptoms for which to return to urgent care or the ED.   - moxifloxacin (AVELOX) 400 MG tablet; Take 1 tablet (400 mg) by mouth daily (Patient not taking: Reported on 11/11/2022)                 No follow-ups on file.    Magali Griggs MD  [facility]NI[facility] is a 47 year old, presenting for the following health issues:  Abdominal Pain (LLQ, Left flank)      HPI   Patient was last seen in the ER 7 days ago and was diagnosed with an early onset of diverticulitis. He was prescribed Ceftin and Flagyl. His symptoms seemed to be improved and he had an allergic reaction to the Flagyl on day 3 so he stopped flagyl. He took his last Ceftin this morning but states his symptoms have been worsening again since yesterday - about the same as when he was in the ER. Patient states his pain is in his left lower quadarant of his abdomen and also his left flank. Patient denies fever/chills. States he has a lack of appetite and feels nauseous. Declined Zofran for nausea.     Medication Followup of  Flagyl/Ceftin, symptoms not improving.     Taking Medication as prescribed: yes for Ceftin, no for Flagyl    Side Effects:  None    Medication Helping Symptoms:  yes    Review of Systems   As above. No other new symptoms. No urinary symptoms.       Objective    /83 (BP Location: Right arm, Patient Position: Chair, Cuff Size: Adult  Regular)   Pulse 78   Temp 97.4  F (36.3  C) (Oral)   Resp 16   SpO2 99%   There is no height or weight on file to calculate BMI.  Physical Exam   Overall well appearing, no distress.   Card: RRR, no murmur, normal S1/S2, no LE swelling.  Resp: clear to auscultation bilaterally, no wheeze or crackles.   Abdomen: Soft, mildly distended, mild pain on exam in the lower left abdomen, lateral left abdomen. No palpable flank pain or epigastric pain. No guarding or rebound.

## 2022-11-11 NOTE — LETTER
11/11/2022         RE: Joel Pineda  [id number]  [facility] [address]        Dear Colleague,    Thank you for referring your patient, Joel Pineda, to the [facility]. Please see a copy of my visit note below.      Joel Pineda is a 47 year old male who is being evaluated via a billable video visit.        Endocrinology and Diabetes Clinic    Consulting provider: Yoan Khoury MD  [address]  [place] [address]    Reason for consultation: low testosterone    HPI:   Joel Pineda is a 47 year old male is coming in in regards to question about low testosterone.  Patient was evaluated for testicular varices by urology.  Labs are drawn for testosterone.    Low libido decrease in sex drive, years  Erectile dysfunction no med    Sleep: snoring CPAP,     Urinary symptoms: urinary frequency no, dysuria no, noturia once a nigth  Head trauma no ECT  Testicular trauma no  Drug use oxcontin  Etoh use no  Depression yes    FH of prostate Ca no    History of neurologic disorder (spinal cord / brain injury, Parkinson, MS, stroke, major surgery) spinal surgery, foraminal narrowin  Vascular (HTN, DM, dyslipidemia, smoking, radiotherapy): yes  Medication: (Antihypertensives, antidepressants, antipsychotics, antiandrogens) yes multiple      Type 2 diabetes for several years.  Initial blood glucose above 200 repeatedly.  Patient has been able to lose about 10 pounds since Ozempic start.    Had recent eye exam, no DR  No numbness no tingling in his feet    Cardiovascular complications patient had an PE more than 10 years ago      Prior medications  Intolerant to metformin due to GI side effects  Intolerance to sulfonylureas due to hypoglycemia        Family history of type 2 diabetes in his mother      Current Problem List:   Patient Active Problem List   Diagnosis     Intermittent asthma     Hyperlipidemia LDL goal <100     Chronic nonallergic rhinitis      Diverticulosis     GERD (gastroesophageal reflux disease)     Generalized anxiety disorder     LLOYD (obstructive sleep apnea)- mild (AHI 11)     Intracranial arachnoid cyst     Facet arthritis of cervical region     Acquired hypothyroidism     Bipolar 2 disorder (H)     Chronic midline low back pain without sciatica     Irritable bowel syndrome with diarrhea     B12 deficiency     Essential hypertension with goal blood pressure less than 140/90     Chronic, continuous use of opioids     Ankylosing spondylitis of sacral region (H)     Morbid obesity (H)     Fatty infiltration of liver     DDD (degenerative disc disease), lumbar     Type 2 diabetes mellitus with complication, without long-term current use of insulin (H)     Peripheral polyneuropathy     History of pulmonary embolism     Ingrown toenail     Hypertriglyceridemia     Gastroparesis     Orthostatic dizziness     POTS (postural orthostatic tachycardia syndrome)     PHN (postherpetic neuralgia)     Dysautonomia (H)     Chronic pain syndrome     Borderline personality disorder (H)     MDD (major depressive disorder), recurrent severe, without psychosis (H)     Folliculitis     Immunosuppression (H)     Small fiber neuropathy     RBD (REM behavioral disorder)         Assessment:  Middle-aged man with multiple medical problems including morbid obesity type 2 diabetes hypothyroidism dyslipidemia POTS chronic pain syndrome on chronic pain medications including all periods.  His lab evaluation for testosterone revealed low total testosterone however the free testosterone has been in the normal range repeatedly.  Sex hormone binding globulin is decreased by obesity and therefore this pattern can be seen.  Treatment with testosterone is indicated for clearly low testosterone levels.    Type 2 diabetes patient is doing very well on Ozempic.  Patient has troubles getting Ozempic by his primary care provider and therefore would like to be followed at this  clinic.    Hypothyroidism doing well on levothyroxine 75 mcg daily      Plan:   Testosterone treatment is not indicated    Continue levothyroxine 75 mcg daily    Increase Ozempic to 1 mg weekly    This note was generated using computer recognized voice recognition. This might result in some expected imperfection.    Abbie Cuenca MD  Endocrinology and Diabetes  Telephone contact:  [facility] [place] [phone]  [facility] [place] [phone]         Past Medical and Past Surgical History:  Past Medical History:   Diagnosis Date     Acne      Acquired hypothyroidism      Allergic state      Ankylosing spondylitis lumbar region (H)      Ankylosing spondylitis of sacral region (H)      Anxiety      Bipolar 2 disorder (H)      Chest pain     Chest pain, regulated w/BP meds. Clear arteries.     Chronic pain      DDD (degenerative disc disease), lumbar      Depressive disorder      Diabetes (H)      Diverticulosis      Facet arthritis of cervical region      Gastroesophageal reflux disease      Hypertension      IBS (irritable bowel syndrome)      Intracranial arachnoid cyst      Major depressive disorder, recurrent episode (H)     Multiple psych providers - they manage meds August 2015: Provigil induced severe mood dis-function      Major depressive disorder, recurrent episode, severe (H) 12/2/2020     MDD (major depressive disorder), recurrent severe, without psychosis (H) 7/21/2021     LLOYD (obstructive sleep apnea)- mild (AHI 11)      Polyneuropathy      Pulmonary embolism (H)      Skin exam, screening for cancer 12/3/2013     Sleep apnea      Uncomplicated asthma        Past Surgical History:   Procedure Laterality Date     BACK SURGERY  10/2007    lumbar discectomy L5-S1     BIOPSY  09/15/2020    Colon Adenomas x2     COLONOSCOPY      Note: colonoscopy scheduled with [facility] on Friday, 9/4/15     COSMETIC SURGERY  2012    Nose Exterior - functional     GI SURGERY  08/2013     Sigmoidectomy     HERNIA REPAIR, UMBILICAL  08/23/2011    Dr. Evan whiting     INCISION AND DRAINAGE, ABSCESS, COMPLEX  08/23/2011    umbilical, Dr. Evan Beavers     LAPAROSCOPIC ASSISTED COLECTOMY LEFT (DESCENDING)  08/15/2013    Procedure: LAPAROSCOPIC ASSISTED COLECTOMY LEFT (DESCENDING);  Laparoscopic Hand Assisted Sigmoid Resection, Mobilization of Splenic Fissure, coloproctoscopy, *Latex Free Room* Anesthesia General with Pain block  ;  Surgeon: Aurora Justice MD;  Location: [facility] OR     NERVE SURGERY  08/18/2011    RF ablation @ L3-S1 @ [facility]     RECONSTRUCT NOSE AND SEPTUM (FUNCTIONAL)  10/14/2011    Procedure:RECONSTRUCT NOSE AND SEPTUM (FUNCTIONAL); Functional Septorhinoplasty, Turbinate Reduction, ; Surgeon:CEDRIC CUEVAS; Location:[facility] OR     SINUS SURGERY  10/01/2001    ethmoidectomy chronic sinusitis     SOFT TISSUE SURGERY  4/7/2022    Hidradenitis Suppurativa surgery       Medications:       Current Outpatient Medications   Medication Sig Dispense Refill     acetaminophen 500 MG CAPS Take 1,000 mg by mouth 2 times daily  60 capsule      albuterol (PROAIR HFA/PROVENTIL HFA/VENTOLIN HFA) 108 (90 Base) MCG/ACT inhaler Inhale 2 puffs into the lungs every 4 hours as needed for shortness of breath / dyspnea or wheezing 8.5 g 11     aspirin (ASA) 81 MG tablet Take 81 mg by mouth daily        blood glucose (CONTOUR NEXT TEST) test strip Use to test blood sugar one to two times daily or as directed. 50 strip 11     buPROPion (WELLBUTRIN XL) 150 MG 24 hr tablet 150 mg every morning       cetirizine (ZYRTEC) 10 MG tablet Take 1 tablet (10 mg) by mouth At Bedtime 30 tablet 11     cholecalciferol (D3-50) 1250 mcg (73055 units) capsule TAKE ONE CAPSULE BY MOUTH EVERY 2 WEEKS. 24 capsule 0     clindamycin (CLINDAMAX) 1 % external gel For flares, start twice daily 60 g 3     clonazePAM (KLONOPIN) 0.5 MG tablet Take 0.5 mg by mouth 2 times daily as needed        cyanocobalamin (CYANOCOBALAMIN) 1000 MCG/ML  injection INJECT 1 ML INTO THE MUSCLE EVERY 30 DAYS 10 mL 0     dronabinol (MARINOL) 2.5 MG capsule Take 2.5 mg by mouth 2 times daily (before meals)       DULoxetine (CYMBALTA) 60 MG capsule Take 120 mg by mouth daily        EPINEPHrine (ANY BX GENERIC EQUIV) 0.3 MG/0.3ML injection 2-pack Inject 0.3 mLs (0.3 mg) into the muscle once as needed for anaphylaxis 0.6 mL 3     ergocalciferol (ERGOCALCIFEROL) 1.25 MG (00869 UT) capsule take 1 capsule by ORAL route every 2 weeks       eszopiclone (LUNESTA) 3 MG tablet Take 3 mg by mouth At Bedtime        etanercept (ENBREL SURECLICK) 50 MG/ML autoinjector Inject 50 mg Subcutaneous once a week . Hold for signs of infection, and seek medical attention. 4 mL 7     famotidine (PEPCID) 20 MG tablet Prior to administration of Humira every 2 weeks (Patient taking differently: Take 20 mg by mouth every 7 days Prior to Enbrel Injections to prevent skin reaction)       fenofibrate (TRICOR) 48 MG tablet TAKE 1 TABLET BY MOUTH ONCE DAILY 90 tablet 2     fluticasone (FLONASE) 50 MCG/ACT nasal spray Spray 2 sprays into both nostrils daily       fluticasone-salmeterol (ADVAIR) 500-50 MCG/ACT inhaler Inhale 1 puff into the lungs every 12 hours 60 each 11     fluticasone-salmeterol (ADVAIR-HFA) 230-21 MCG/ACT inhaler Inhale 2 puffs into the lungs 2 times daily for 30 days 12 g 11     levothyroxine (SYNTHROID/LEVOTHROID) 75 MCG tablet TAKE ONE TABLET BY MOUTH EVERY MORNING 90 tablet 2     lidocaine (XYLOCAINE) 5 % external ointment APPLY TOPICALLY 4 TIMES DAILY AS NEEDED FOR PAIN 50 g 1     melatonin 3 MG tablet Take 13 mg by mouth nightly as needed        methocarbamol (ROBAXIN) 500 MG tablet TAKE 1 - 2 TABLETS BY MOUTH 4 TIMES DAILY AS NEEDED FOR MUSCLE SPASMS 240 tablet 1     metoclopramide (REGLAN) 5 MG tablet Take 5 mg by mouth 4 times daily as needed       metoprolol succinate ER (TOPROL-XL) 200 MG 24 hr tablet TAKE ONE TABLET BY MOUTH TWICE DAILY 180 tablet 2     montelukast  (SINGULAIR) 10 MG tablet TAKE ONE TABLET BY MOUTH EVERY EVENING 90 tablet 3     nabumetone (RELAFEN) 500 MG tablet TAKE 1-2 TABLETS BY MOUTH TWICE DAILY WITH FOOD AS NEEDED FOR MODERATE PAIN 120 tablet 0     naloxone (NARCAN) 4 MG/0.1ML nasal spray Spray 1 spray (4 mg) into one nostril alternating nostrils once as needed for opioid reversal every 2-3 minutes until assistance arrives (Patient not taking: Reported on 9/15/2022) 0.2 mL 0     olopatadine (PATADAY) 0.2 % ophthalmic solution Place 1 drop into both eyes daily 2.5 mL 3     omega-3 acid ethyl esters (LOVAZA) 1 g capsule TAKE 2 CAPSULES BY MOUTH TWICE DAILY 360 capsule 2     omeprazole 20 MG tablet Take 20 mg by mouth daily        ondansetron (ZOFRAN-ODT) 8 MG ODT tab Take 8 mg by mouth every 8 hours as needed for nausea       order for DME Equipment being ordered: lumbosacral belt/brace 1 Units 0     order for DME Respironics REMSTAR 60 Series Auto CPAP 9-13 cm H2O, Wisp nasal mask w/a large cushion and a chinstrap       oxyCODONE (ROXICODONE) 5 MG tablet Take 1 tablet (5 mg) by mouth every 6 hours as needed for pain (maximum 6 tablet(s) per day) 35 tablet 0     pregabalin (LYRICA) 150 MG capsule TAKE ONE CAPSULE BY MOUTH THREE TIMES DAILY 270 capsule 0     pseudoePHEDrine-guaiFENesin (MUCINEX D)  MG 12 hr tablet        pyridostigmine (MESTINON) 60 MG tablet Take 1 tablet by mouth 3 times daily       ramipril (ALTACE) 10 MG capsule TAKE 1 CAPSULE BY MOUTH ONE TIME DAILY 90 capsule 2     risperiDONE (RISPERDAL) 0.5 MG tablet Take 0.5 mg by mouth 2 times daily as needed       rizatriptan (MAXALT) 10 MG tablet Take 1 tablet (10 mg) by mouth at onset of headache for migraine May repeat in 2 hours. Max 3 tablets/24 hours. 30 tablet 1     rosuvastatin (CRESTOR) 40 MG tablet TAKE ONE TABLET BY MOUTH ONCE DAILY 90 tablet 2     semaglutide (OZEMPIC, 0.25 OR 0.5 MG/DOSE,) 2 MG/1.5ML SOPN pen Inject 0.25 mg Subcutaneous every 7 days for 28 days, THEN 0.5 mg every  "7 days. 4.5 mL 0     sodium fluoride 1.1 % CREA At Bedtime       spacer (OPTICHAMBER JOHNNY) holding chamber To be used in conjunction with albuterol (PROAIR HFA/PROVENTIL HFA/VENTOLIN HFA) 108 (90 Base) MCG/ACT inhaler 1 each 0     syringe/needle, disp, (BD INTEGRA SYRINGE) 25G X 1\" 3 ML MISC USE WITH B12 INJECTIONS 12 each 0     valACYclovir (VALTREX) 500 MG tablet TAKE ONE TABLET BY MOUTH ONCE DAILY 90 tablet 0     vitamin B complex with vitamin C (STRESS TAB) tablet Take 1 tablet by mouth daily       ZINC SULFATE-VITAMIN C MT Take 1 tablet by mouth daily         Allergies:   Allergies   Allergen Reactions     Amoxicillin-Pot Clavulanate Difficulty breathing     Banana Shortness Of Breath     Pt reports organic Banana is okay.      Nitroglycerin Palpitations     Penicillins Anaphylaxis     Provigil [Modafinil] Shortness Of Breath     headache     Flu Virus Vaccine Rash     Gadolinium Hives and Itching     Patient was premedicated for the contrast allergy. He did still have a reaction a few hours after injection. Hives and itching. Dr. Gomez told tech to inform pt he should only have contrast again in the future when premedicated and at a hospital. Not at an outpatient facility.      Ketoconazole      Topical cream caused swelling and itching     Dye [Contrast Dye] Other (See Comments) and Hives     Moderate flushing, CT contrast     Gabapentin      Other reaction(s): hives     Golimumab      Hives, bradycardia, face swelling     Naproxen      Other reaction(s): Bleeding Gums     Neurontin [Gabapentin] Hives     Moderate hives     Nortriptyline Hives     Nystatin Hives     Varicella Virus Vaccine Live      Rash     Baclofen Other (See Comments)     Cognitive changes     Flagyl [Metronidazole Hcl] Palpitations and Hives     Latex Rash     Metronidazole Palpitations, Other (See Comments) and Rash     dizziness (versus ciprofloxacin taken at same time)       Social History     Tobacco Use     Smoking status: " "Never     Smokeless tobacco: Never   Substance Use Topics     Alcohol use: Not Currently     Comment: occ 1/week       Family History   Problem Relation Age of Onset     Musculoskeletal Disorder Mother         back     Anxiety Disorder Mother      Colon Polyps Mother      Ulcerative Colitis Mother         and ischemic small intestine, surgery     Hypertension Mother      Breast Cancer Mother      Osteoporosis Mother      Diabetes Mother         Type 2, Diagnosed in 2014     Depression Mother         Takes Cymbalta to help with chronic pain + depx     Thyroid Disease Mother         Hypothyroidism     Obesity Mother         Under much better control latter half of 2015     Musculoskeletal Disorder Father         back     Substance Abuse Father         Alcohol     Hypertension Father      Hyperlipidemia Father      Depression Father         Off meds for many years. Seems \"ok\"     Anxiety Disorder Father      Heart Disease Maternal Grandmother      Heart Disease Maternal Grandfather      Psychotic Disorder Paternal Grandfather      Suicide Paternal Grandfather      Depression Paternal Grandfather         Pediatrician. Committed suicide by pistol in 1990.     Musculoskeletal Disorder Brother         back     Depression Brother         Expressed as anger and moodiness     Substance Abuse Brother         Alcohol     Anxiety Disorder Brother      Substance Abuse Sister         Alcohol     Depression Sister         Mental Health Therapist, yet no anti-depressants?     Anxiety Disorder Sister         Mental Health Therapist, yet no anti-anxiety meds?     Other Cancer Other         Bladder     Substance Abuse Brother      Colon Cancer No family hx of      Crohn's Disease No family hx of      Anesthesia Reaction No family hx of      Cancer No family hx of         No family history of skin cancer       Review of Systems:  12 point review of system was negative except for above mentioned    Physical Examination:  There were no " vitals taken for this visit.  There is no height or weight on file to calculate BMI.    Wt Readings from Last 4 Encounters:  10/19/22 : 140.6 kg (310 lb)  08/10/22 : 142.9 kg (315 lb)  07/11/22 : 143.8 kg (317 lb)  06/08/22 : 144.6 kg (318 lb 12.8 oz)      General: Well appearing tall obese man  in no distress,  Eyes: EOMI. Sclerae and conjunctivae are clear.   HENT: No thyromegaly or mass.    Lymphatic: No cervical or supraclavicular lymphadenopathy.  Cardiovascular: RRR, with normal S1+S2 and no murmurs.   Skin: No rash or lesions. No abdominal striae.    Extremities: No peripheral edema.   Neurologic: No tremor with hands outstretched.      Labs and Studies:   Lab Results   Component Value Date     10/10/2022    CO2 25 10/10/2022    CHLORIDE 102 10/10/2022    CR 0.98 10/10/2022    TRIG 367 (H) 04/21/2022    HDL 38 (L) 04/21/2022    HGB 15.6 10/19/2022     Lab Results   Component Value Date    ALT 31 10/19/2022    AST 30 10/19/2022    BILITOTAL 0.5 10/19/2022    CR 0.98 10/10/2022     10/10/2022    TSH 2.59 02/10/2022    ALKPHOS 80 10/19/2022    TESTOSTTOTAL 194 (L) 08/03/2022    TESTOSTTOTAL 203 (L) 07/25/2022    TESTOSTTOTAL 354 03/19/2013    SHBGT 17 08/03/2022    SHBGT 15 07/25/2022    FT 5.12 08/03/2022    FT 5.64 07/25/2022    PSA 0.33 09/28/2015    TRIG 367 (H) 04/21/2022    TRIG 545 (H) 02/10/2022    TRIG 631 (H) 11/10/2021    LDL 44 04/21/2022    HDL 38 (L) 04/21/2022    HDL 29 (L) 02/10/2022    HDL 27 (L) 11/10/2021    PROLACTIN 25 (H) 01/19/2012     Lab Results   Component Value Date     10/10/2022    CHLORIDE 102 10/10/2022    CO2 25 10/10/2022    GLC 92 10/10/2022    CR 0.98 10/10/2022    CR 0.95 02/10/2022    CR 0.74 11/10/2021    CR 1.28 (H) 10/16/2020    CR 0.77 10/09/2020    ALYCE 10.3 (H) 10/10/2022    MAG 2.1 08/19/2013    ALBUMIN 4.4 10/19/2022    ALKPHOS 80 10/19/2022    LDL 44 04/21/2022    HDL 38 (L) 04/21/2022    TRIG 367 (H) 04/21/2022    TSH 2.59 02/10/2022    TSH 2.11  02/10/2021    TSH 2.17 01/24/2020     Lab Results   Component Value Date    MICROL 9 09/23/2022    MICROL 8 11/10/2021    MICROL 8 10/14/2020    MICROL 12 01/24/2020    MICROL <5 12/24/2018     Lab Results   Component Value Date    A1C 6.4 (H) 09/23/2022    A1C 6.3 (H) 02/10/2022    A1C 6.0 (H) 08/19/2021    A1C 6.5 (H) 02/10/2021    A1C 6.0 (H) 10/16/2020       Lab Results   Component Value Date    HGB 15.6 10/19/2022                     Answers for HPI/ROS submitted by the patient on 11/9/2022  General Symptoms: No  Skin Symptoms: Yes  HENT Symptoms: Yes  EYE SYMPTOMS: Yes  HEART SYMPTOMS: No  LUNG SYMPTOMS: Yes  INTESTINAL SYMPTOMS: Yes  URINARY SYMPTOMS: No  REPRODUCTIVE SYMPTOMS: Yes  SKELETAL SYMPTOMS: Yes  BLOOD SYMPTOMS: No  NERVOUS SYSTEM SYMPTOMS: Yes  MENTAL HEALTH SYMPTOMS: Yes  Tinnitus: Yes  Congestion: Yes   Voice hoarseness: Yes  Sore throat: Yes  Dry mouth: Yes  Acne: Yes  Dry eyes: Yes  Redness: Yes  Eye irritation: Yes  Cough: Yes  Sputum or phlegm: Yes  Nausea: Yes  Abdominal pain: Yes  Diarrhea: Yes  Erectile dysfunction: Yes  Reduced libido: Yes  Back pain: Yes  Muscle aches: Yes  Neck pain: Yes  Joint pain: Yes  Joint stiffness: Yes  Dizziness or trouble with balance: Yes  Memory loss: Yes  Headache: Yes  Tingling: Yes  Numbness: Yes  Nervous or Anxious: Yes  Depression: Yes  Trouble thinking or concentrating: Yes          Again, thank you for allowing me to participate in the care of your patient.        Sincerely,        bAbie Cuenca MD

## 2022-11-11 NOTE — TELEPHONE ENCOUNTER
Provider E-Visit time total (minutes): no charge recommended [facility]     Phone call to patient- has abdominal pain, history of diverticulitis on antibiotics - recent emergency department visit- pain worse     Has an appointment at [facility] end[facility] therapy this AM  1230 pm  Will go to [facility]  Consulted with Dr Love and accepting patient to be seen at [facility] at 1230 pm

## 2022-11-11 NOTE — PATIENT INSTRUCTIONS
[facility]-Department of Endocrinology  Diabetes Educators:   Iliana Alexander, RN and Stacey Laguna RN  Clinic Nurse: ESTEFANI Art  CMA's: Adalberto   LPN: Cuca  Scheduling/Clinic phone number : [phone]   Clinic Fax: [phone]  On-Call Endocrine at the [place] (after hours/weekends): [phone] option 4      Increase Ozempic to 1 mg weekly    Continue levothyroxine 75 mcg daily                            Please call the number below to schedule your labs.    Lab    General [phone]   [facility] [phone]   [place] [phone]   [facility]  [phone]   [facility] [phone]   [place] [phone]   [facility]) [phone]   [facility] [phone]   [place] [phone]   [place] [phone]   [place] [phone]     Please reach out to the following centers to schedule your imaging appointment:       Imaging (DEXA, CT, MRI, XRAY)    [facility] ([facility], [facility], [place]) [phone]   [facility] ([place], [place]) [phone]   [facility] ([facility]) [phone]   [facility] ([facility]) [phone]     Appointment Reminders:  * Please bring meter with for staff to download  * If you are due ONLY for an A1C, it is scheduled with the nurse and will be done in clinic. You do not need to schedule a lab appointment. Fasting is not required for an A1C.  * Refill request should be submitted to your pharmacy. They will contact clinic for approval.

## 2022-11-25 NOTE — TELEPHONE ENCOUNTER
The order has been signed. Patient informed.     Please send for insurance approval.     JOCELYN Padgett, NP-C  [facility]      '

## 2022-11-28 NOTE — TELEPHONE ENCOUNTER
Medicare is primary        Coverage Guidance-No PA required     Coverage Indications, Limitations, and/or Medical Necessity      Facet Joint Denervation:     The thermal radiofrequency destruction of cervical, thoracic, or lumbar paravertebral facet joint (medial branch) nerves are considered medically reasonable and necessary for patients who meet ALL the following criteria:     i. Repeat thermal facet joint RFA at the same anatomic site is considered medically reasonable and necessary provided the patient had a minimum of consistent 50% improvement in pain for at least six (6) months or at least 50% consistent improvement in the ability to perform previously painful movements and ADLs as compared to baseline measurement using the same scale;     Frequency Limitation: For each covered spinal region no more than two (2) radiofrequency sessions will be reimbursed per rolling 12 months.        MEDICA RFA REQUIREMENTS-   B. Repeat radiofrequency ablation (RFA) denervation / neurotomy at the same facet joint level is considered medically necessary when documentation in the medical records indicates that ALL of the following criteria are met:  1. A minimum of six months has elapsed since the last RFA (maximum of 2 times over a 12-month period per side and level).  2. The initial RFA relieved at least 50% of the pain at least 3 months.  3. Severe pain limiting activities of daily living for at least three months despite conservative treatments (such as pharmacotherapy, exercise program, activity modification, PT or chiropractic care). Documentation of conservative treatments must correspond to the current episode of pain (within six months).  Note: For individuals unable to complete a minimum of three months of nonoperative management, clinical records and notes from the physician/physical therapist that explain why conservative treatment cannot be met are required.  Note: When performing a repeat radiofrequency joint  denervation/ablation at the same spinal level(s) and side as a prior successful denervation/ablation procedure, further diagnostic facet joint injections/medial branch blocks at that spinal level(s) are not necessary.  4. Repeat RFA treatment procedure is limited to three levels per side of each spinal region (cervical, thoracic or lumbar) in a six-month period.        This information was already routed to nursing in TE from 11/25/22        Lorraine CHAVARRIA    [facility]

## 2022-11-30 ENCOUNTER — MYC REFILL (OUTPATIENT)
Dept: FAMILY MEDICINE | Facility: CLINIC | Age: 47
End: 2022-11-30

## 2022-11-30 DIAGNOSIS — M51.369 DDD (DEGENERATIVE DISC DISEASE), LUMBAR: ICD-10-CM

## 2022-11-30 DIAGNOSIS — M45.8 ANKYLOSING SPONDYLITIS OF SACRAL REGION (H): ICD-10-CM

## 2022-11-30 RX ORDER — OXYCODONE HYDROCHLORIDE 5 MG/1
5 TABLET ORAL EVERY 6 HOURS PRN
Qty: 35 TABLET | Refills: 0 | Status: SHIPPED | OUTPATIENT
Start: 2022-12-01 | End: 2022-12-14

## 2022-12-07 ENCOUNTER — MYC MEDICAL ADVICE (OUTPATIENT)
Dept: RHEUMATOLOGY | Facility: CLINIC | Age: 47
End: 2022-12-07

## 2022-12-07 ASSESSMENT — ACTIVITIES OF DAILY LIVING (ADL): CURRENT_FUNCTION: NO ASSISTANCE NEEDED

## 2022-12-07 ASSESSMENT — ENCOUNTER SYMPTOMS
PALPITATIONS: 0
SORE THROAT: 0
COUGH: 0
HEMATOCHEZIA: 0
MYALGIAS: 1
WEAKNESS: 0
FEVER: 0
SHORTNESS OF BREATH: 0
HEMATURIA: 0
CONSTIPATION: 1
JOINT SWELLING: 0
NERVOUS/ANXIOUS: 1
CHILLS: 0
FREQUENCY: 0
ABDOMINAL PAIN: 0
HEADACHES: 1
DIARRHEA: 0
EYE PAIN: 0
NAUSEA: 1
DIZZINESS: 1
DYSURIA: 0
ARTHRALGIAS: 1
HEARTBURN: 0
PARESTHESIAS: 0

## 2022-12-07 ASSESSMENT — ASTHMA QUESTIONNAIRES
ACT_TOTALSCORE: 22
ACT_TOTALSCORE: 22
QUESTION_1 LAST FOUR WEEKS HOW MUCH OF THE TIME DID YOUR ASTHMA KEEP YOU FROM GETTING AS MUCH DONE AT WORK, SCHOOL OR AT HOME: NONE OF THE TIME
QUESTION_4 LAST FOUR WEEKS HOW OFTEN HAVE YOU USED YOUR RESCUE INHALER OR NEBULIZER MEDICATION (SUCH AS ALBUTEROL): ONCE A WEEK OR LESS
QUESTION_5 LAST FOUR WEEKS HOW WOULD YOU RATE YOUR ASTHMA CONTROL: WELL CONTROLLED
QUESTION_3 LAST FOUR WEEKS HOW OFTEN DID YOUR ASTHMA SYMPTOMS (WHEEZING, COUGHING, SHORTNESS OF BREATH, CHEST TIGHTNESS OR PAIN) WAKE YOU UP AT NIGHT OR EARLIER THAN USUAL IN THE MORNING: NOT AT ALL
QUESTION_2 LAST FOUR WEEKS HOW OFTEN HAVE YOU HAD SHORTNESS OF BREATH: ONCE OR TWICE A WEEK

## 2022-12-07 ASSESSMENT — PATIENT HEALTH QUESTIONNAIRE - PHQ9
10. IF YOU CHECKED OFF ANY PROBLEMS, HOW DIFFICULT HAVE THESE PROBLEMS MADE IT FOR YOU TO DO YOUR WORK, TAKE CARE OF THINGS AT HOME, OR GET ALONG WITH OTHER PEOPLE: VERY DIFFICULT
SUM OF ALL RESPONSES TO PHQ QUESTIONS 1-9: 13
SUM OF ALL RESPONSES TO PHQ QUESTIONS 1-9: 13

## 2022-12-07 NOTE — TELEPHONE ENCOUNTER
Physician portion sent to Farida Poe; will need that, allergy list, medication list, and possibly the patient's portion that he attached in this MyChart encounter sent in.  Farida, you for taking care of this.    Oneil Baxter MD  12/7/2022

## 2022-12-08 NOTE — TELEPHONE ENCOUNTER
Sent provider's application via email.    Farida Poe Cp  [facility] Liaison  [email or web address]  Phone: [phone]  Fax: [phone]

## 2022-12-14 ENCOUNTER — OFFICE VISIT (OUTPATIENT)
Dept: FAMILY MEDICINE | Facility: CLINIC | Age: 47
End: 2022-12-14
Payer: MEDICARE

## 2022-12-14 ENCOUNTER — MYC MEDICAL ADVICE (OUTPATIENT)
Dept: SLEEP MEDICINE | Facility: CLINIC | Age: 47
End: 2022-12-14

## 2022-12-14 VITALS
BODY MASS INDEX: 36.26 KG/M2 | HEART RATE: 81 BPM | DIASTOLIC BLOOD PRESSURE: 86 MMHG | RESPIRATION RATE: 18 BRPM | SYSTOLIC BLOOD PRESSURE: 123 MMHG | WEIGHT: 307.1 LBS | OXYGEN SATURATION: 95 % | HEIGHT: 77 IN | TEMPERATURE: 99.3 F

## 2022-12-14 DIAGNOSIS — Z00.00 ENCOUNTER FOR MEDICARE ANNUAL WELLNESS EXAM: Primary | ICD-10-CM

## 2022-12-14 DIAGNOSIS — E11.8 TYPE 2 DIABETES MELLITUS WITH COMPLICATION, WITHOUT LONG-TERM CURRENT USE OF INSULIN (H): ICD-10-CM

## 2022-12-14 DIAGNOSIS — E78.5 HYPERLIPIDEMIA LDL GOAL <70: ICD-10-CM

## 2022-12-14 DIAGNOSIS — M51.369 DDD (DEGENERATIVE DISC DISEASE), LUMBAR: ICD-10-CM

## 2022-12-14 DIAGNOSIS — E66.01 MORBID OBESITY (H): ICD-10-CM

## 2022-12-14 DIAGNOSIS — E78.1 HYPERTRIGLYCERIDEMIA: ICD-10-CM

## 2022-12-14 DIAGNOSIS — E03.9 ACQUIRED HYPOTHYROIDISM: ICD-10-CM

## 2022-12-14 DIAGNOSIS — M45.8 ANKYLOSING SPONDYLITIS OF SACRAL REGION (H): ICD-10-CM

## 2022-12-14 DIAGNOSIS — G89.4 CHRONIC PAIN SYNDROME: ICD-10-CM

## 2022-12-14 DIAGNOSIS — F33.2 MDD (MAJOR DEPRESSIVE DISORDER), RECURRENT SEVERE, WITHOUT PSYCHOSIS (H): ICD-10-CM

## 2022-12-14 DIAGNOSIS — E53.8 B12 DEFICIENCY: ICD-10-CM

## 2022-12-14 DIAGNOSIS — I10 ESSENTIAL HYPERTENSION WITH GOAL BLOOD PRESSURE LESS THAN 140/90: ICD-10-CM

## 2022-12-14 DIAGNOSIS — F31.81 BIPOLAR 2 DISORDER (H): ICD-10-CM

## 2022-12-14 PROCEDURE — G0439 PPPS, SUBSEQ VISIT: HCPCS | Performed by: FAMILY MEDICINE

## 2022-12-14 PROCEDURE — 99214 OFFICE O/P EST MOD 30 MIN: CPT | Mod: 25 | Performed by: FAMILY MEDICINE

## 2022-12-14 RX ORDER — ROSUVASTATIN CALCIUM 40 MG/1
40 TABLET, COATED ORAL DAILY
Qty: 90 TABLET | Refills: 1 | Status: SHIPPED | OUTPATIENT
Start: 2022-12-14 | End: 2023-07-17

## 2022-12-14 RX ORDER — METOPROLOL SUCCINATE 200 MG/1
200 TABLET, EXTENDED RELEASE ORAL 2 TIMES DAILY
Qty: 180 TABLET | Refills: 1 | Status: SHIPPED | OUTPATIENT
Start: 2022-12-14 | End: 2023-06-26

## 2022-12-14 RX ORDER — LEVOTHYROXINE SODIUM 75 UG/1
TABLET ORAL
Qty: 90 TABLET | Refills: 2 | Status: SHIPPED | OUTPATIENT
Start: 2022-12-14 | End: 2023-09-13

## 2022-12-14 RX ORDER — CYANOCOBALAMIN 1000 UG/ML
INJECTION, SOLUTION INTRAMUSCULAR; SUBCUTANEOUS
Qty: 10 ML | Refills: 0 | Status: SHIPPED | OUTPATIENT
Start: 2022-12-14 | End: 2024-02-23

## 2022-12-14 RX ORDER — FENOFIBRATE 48 MG/1
48 TABLET, COATED ORAL DAILY
Qty: 90 TABLET | Refills: 1 | Status: SHIPPED | OUTPATIENT
Start: 2022-12-14 | End: 2023-08-14

## 2022-12-14 RX ORDER — OXYCODONE HYDROCHLORIDE 5 MG/1
5 TABLET ORAL EVERY 6 HOURS PRN
Qty: 35 TABLET | Refills: 0 | Status: SHIPPED | OUTPATIENT
Start: 2022-12-14 | End: 2023-01-10

## 2022-12-14 ASSESSMENT — ANXIETY QUESTIONNAIRES
GAD7 TOTAL SCORE: 12
7. FEELING AFRAID AS IF SOMETHING AWFUL MIGHT HAPPEN: MORE THAN HALF THE DAYS
3. WORRYING TOO MUCH ABOUT DIFFERENT THINGS: MORE THAN HALF THE DAYS
GAD7 TOTAL SCORE: 12
6. BECOMING EASILY ANNOYED OR IRRITABLE: MORE THAN HALF THE DAYS
8. IF YOU CHECKED OFF ANY PROBLEMS, HOW DIFFICULT HAVE THESE MADE IT FOR YOU TO DO YOUR WORK, TAKE CARE OF THINGS AT HOME, OR GET ALONG WITH OTHER PEOPLE?: VERY DIFFICULT
IF YOU CHECKED OFF ANY PROBLEMS ON THIS QUESTIONNAIRE, HOW DIFFICULT HAVE THESE PROBLEMS MADE IT FOR YOU TO DO YOUR WORK, TAKE CARE OF THINGS AT HOME, OR GET ALONG WITH OTHER PEOPLE: VERY DIFFICULT
5. BEING SO RESTLESS THAT IT IS HARD TO SIT STILL: NOT AT ALL
1. FEELING NERVOUS, ANXIOUS, OR ON EDGE: MORE THAN HALF THE DAYS
4. TROUBLE RELAXING: MORE THAN HALF THE DAYS
2. NOT BEING ABLE TO STOP OR CONTROL WORRYING: MORE THAN HALF THE DAYS
7. FEELING AFRAID AS IF SOMETHING AWFUL MIGHT HAPPEN: MORE THAN HALF THE DAYS

## 2022-12-14 ASSESSMENT — ENCOUNTER SYMPTOMS
HEADACHES: 1
HEARTBURN: 0
MYALGIAS: 1
CONSTIPATION: 1
NERVOUS/ANXIOUS: 1
JOINT SWELLING: 0
DYSURIA: 0
HEMATURIA: 0
DIZZINESS: 1
CHILLS: 0
DIARRHEA: 0
EYE PAIN: 0
HEMATOCHEZIA: 0
FEVER: 0
NAUSEA: 1
ABDOMINAL PAIN: 0
PALPITATIONS: 0
WEAKNESS: 0
FREQUENCY: 0
SORE THROAT: 0
PARESTHESIAS: 0
COUGH: 0
ARTHRALGIAS: 1
SHORTNESS OF BREATH: 0

## 2022-12-14 ASSESSMENT — ACTIVITIES OF DAILY LIVING (ADL): CURRENT_FUNCTION: NO ASSISTANCE NEEDED

## 2022-12-14 ASSESSMENT — PAIN SCALES - GENERAL: PAINLEVEL: MILD PAIN (3)

## 2022-12-14 NOTE — PROGRESS NOTES
"    The patient was provided with suggestions to help him develop a healthy physical lifestyle.  He is at risk for lack of exercise and has been provided with information to increase physical activity for the benefit of his well-being.  The patient was counseled and encouraged to consider modifying their diet and eating habits. He was provided with information on recommended healthy diet options.  The patient was provided with suggestions to help him develop a healthy emotional lifestyle.  The patient s PHQ-9 score is consistent with moderate depression. He was provided with information regarding depression and was advised to schedule a follow up appointment in  weeks to further address this issue.  Answers for HPI/ROS submitted by the patient on 12/7/2022  If you checked off any problems, how difficult have these problems made it for you to do your work, take care of things at home, or get along with other people?: Very difficult  PHQ9 TOTAL SCORE: 13  YUDI 7 TOTAL SCORE: 12  In general, how would you rate your overall physical health?: fair  Frequency of exercise:: None  Do you usually eat at least 4 servings of fruit and vegetables a day, include whole grains & fiber, and avoid regularly eating high fat or \"junk\" foods? : No  Taking medications regularly:: Yes  Medication side effects:: None  Activities of Daily Living: no assistance needed  Home safety: no safety concerns identified  Hearing Impairment:: no hearing concerns  In the past 6 months, have you been bothered by leaking of urine?: No  abdominal pain: No  Blood in stool: No  Blood in urine: No  chest pain: No  chills: No  congestion: Yes  constipation: Yes  cough: No  diarrhea: No  dizziness: Yes  ear pain: No  eye pain: No  nervous/anxious: Yes  fever: No  frequency: No  genital sores: No  headaches: Yes  hearing loss: No  heartburn: No  arthralgias: Yes  joint swelling: No  peripheral edema: No  mood changes: Yes  myalgias: Yes  nausea: Yes  dysuria: " No  palpitations: No  Skin sensation changes: No  sore throat: No  urgency: No  rash: No  shortness of breath: No  visual disturbance: No  weakness: No  impotence: Yes  penile discharge: No  In general, how would you rate your overall mental or emotional health?: fair  Additional concerns today:: No

## 2022-12-14 NOTE — PATIENT INSTRUCTIONS
Patient Education   Personalized Prevention Plan  You are due for the preventive services outlined below.  Your care team is available to assist you in scheduling these services.  If you have already completed any of these items, please share that information with your care team to update in your medical record.  Health Maintenance Due   Topic Date Due     Diabetic Foot Exam  08/10/2022     Your Health Risk Assessment indicates you feel you are not in good health    A healthy lifestyle helps keep the body fit and the mind alert. It helps protect you from disease, helps you fight disease, and helps prevent chronic disease (disease that doesn't go away) from getting worse. This is important as you get older and begin to notice twinges in muscles and joints and a decline in the strength and stamina you once took for granted. A healthy lifestyle includes good healthcare, good nutrition, weight control, recreation, and regular exercise. Avoid harmful substances and do what you can to keep safe. Another part of a healthy lifestyle is stay mentally active and socially involved.    Good healthcare     Have a wellness visit every year.     If you have new symptoms, let us know right away. Don't wait until the next checkup.     Take medicines exactly as prescribed and keep your medicines in a safe place. Tell us if your medicine causes problems.   Healthy diet and weight control     Eat 3 or 4 small, nutritious, low-fat, high-fiber meals a day. Include a variety of fruits, vegetables, and whole-grain foods.     Make sure you get enough calcium in your diet. Calcium, vitamin D, and exercise help prevent osteoporosis (bone thinning).     If you live alone, try eating with others when you can. That way you get a good meal and have company while you eat it.     Try to keep a healthy weight. If you eat more calories than your body uses for energy, it will be stored as fat and you will gain weight.     Recreation   Recreation is  not limited to sports and team events. It includes any activity that provides relaxation, interest, enjoyment, and exercise. Recreation provides an outlet for physical, mental, and social energy. It can give a sense of worth and achievement. It can help you stay healthy.    Mental Exercise and Social Involvement  Mental and emotional health is as important as physical health. Keep in touch with friends and family. Stay as active as possible. Continue to learn and challenge yourself.   Things you can do to stay mentally active are:    Learn something new, like a foreign language or musical instrument.     Play SCRABBLE or do crossword puzzles. If you cannot find people to play these games with you at home, you can play them with others on your computer through the Internet.     Join a games club--anything from card games to chess or checkers or lawn bowling.     Start a new hobby.     Go back to school.     Volunteer.     Read.   Keep up with world events.    Exercise for a Healthier Heart  You may wonder how you can improve the health of your heart. If you re thinking about exercise, you re on the right track. You don t need to become an athlete. But you do need a certain amount of brisk exercise to help strengthen your heart. If you have been diagnosed with a heart condition, your healthcare provider may advise exercise to help stabilize your condition. To help make exercise a habit, choose safe, fun activities.      Exercise with a friend. When activity is fun, you're more likely to stick with it.   Before you start  Check with your healthcare provider before starting an exercise program. This is especially important if you have not been active for a while. It's also important if you have a long-term (chronic) health problem such as heart disease, diabetes, or obesity. Or if you are at high risk for having these problems.   Why exercise?  Exercising regularly offers many healthy rewards. It can help you do all of  the following:     Improve your blood cholesterol level to help prevent further heart trouble    Lower your blood pressure to help prevent a stroke or heart attack    Control diabetes, or reduce your risk of getting this disease    Improve your heart and lung function    Reach and stay at a healthy weight    Make your muscles stronger so you can stay active    Prevent falls and fractures by slowing the loss of bone mass (osteoporosis)    Manage stress better    Reduce your blood pressure    Improve your sense of self and your body image  Exercise tips      Ease into your routine. Set small goals. Then build on them. If you are not sure what your activity level should be, talk with your healthcare provider first before starting an exercise routine.    Exercise on most days. Aim for a total of 150 minutes (2 hours and 30 minutes) or more of moderate-intensity aerobic activity each week. Or 75 minutes (1 hour and 15 minutes) or more of vigorous-intensity aerobic activity each week. Or try for a combination of both. Moderate activity means that you breathe heavier and your heart rate increases but you can still talk. Think about doing 40 minutes of moderate exercise, 3 to 4 times a week. For best results, activity should last for about 40 minutes to lower blood pressure and cholesterol. It's OK to work up to the 40-minute period over time. Examples of moderate-intensity activity are walking 1 mile in 15 minutes. Or doing 30 to 45 minutes of yard work.    Step up your daily activity level.  Along with your exercise program, try being more active the whole day. Walk instead of drive. Or park further away so that you take more steps each day. Do more household tasks or yard work. You may not be able to meet the advised mount of physical activity. But doing some moderate- or vigorous-intensity aerobic activity can help reduce your risk for heart disease. Your healthcare provider can help you figure out what is best for  you.    Choose 1 or more activities you enjoy.  Walking is one of the easiest things you can do. You can also try swimming, riding a bike, dancing, or taking an exercise class.    When to call your healthcare provider  Call your healthcare provider if you have any of these:     Chest pain or feel dizzy or lightheaded    Burning, tightness, pressure, or heaviness in your chest, neck, shoulders, back, or arms    Abnormal shortness of breath    More joint or muscle pain    A very fast or irregular heartbeat (palpitations)  [employer] last reviewed this educational content on 7/1/2019 2000-2021 [employer]. All rights reserved. This information is not intended as a substitute for professional medical care. Always follow your healthcare professional's instructions.          Understanding USDA MyPlate  The USDA has guidelines to help you make healthy food choices. These are called MyPlate. MyPlate shows the food groups that make up healthy meals using the image of a place setting. Before you eat, think about the healthiest choices for what to put on your plate or in your cup or bowl. To learn more about building a healthy plate, visit www.choosemyplate.gov.    The food groups    Fruits. Any fruit or 100% fruit juice counts as part of the Fruit Group. Fruits may be fresh, canned, frozen, or dried, and may be whole, cut-up, or pureed. Make 1/2 of your plate fruits and vegetables.    Vegetables. Any vegetable or 100% vegetable juice counts as a member of the Vegetable Group. Vegetables may be fresh, frozen, canned, or dried. They can be served raw or cooked and may be whole, cut-up, or mashed. Make 1/2 of your plate fruits and vegetables.    Grains. All foods made from grains are part of the Grains Group. These include wheat, rice, oats, cornmeal, and barley. Grains are often used to make foods such as bread, pasta, oatmeal, cereal, tortillas, and grits. Grains should be no more than 1/4 of your plate. At  least half of your grains should be whole grains.    Protein. This group includes meat, poultry, seafood, beans and peas, eggs, processed soy products (such as tofu), nuts (including nut butters), and seeds. Make protein choices no more than 1/4 of your plate. Meat and poultry choices should be lean or low fat.    Dairy. The Dairy Group includes all fluid milk products and foods made from milk that contain calcium, such as yogurt and cheese. (Foods that have little calcium, such as cream, butter, and cream cheese, are not part of this group.) Most dairy choices should be low-fat or fat-free.    Oils. Oils aren't a food group, but they do contain essential nutrients. However it's important to watch your intake of oils. These are fats that are liquid at room temperature. They include canola, corn, olive, soybean, vegetable, and sunflower oil. Foods that are mainly oil include mayonnaise, certain salad dressings, and soft margarines. You likely already get your daily oil allowance from the foods you eat.  Things to limit  Eating healthy also means limiting these things in your diet:       Salt (sodium). Many processed foods have a lot of sodium. To keep sodium intake down, eat fresh vegetables, meats, poultry, and seafood when possible. Purchase low-sodium, reduced-sodium, or no-salt-added food products at the store. And don't add salt to your meals at home. Instead, season them with herbs and spices such as dill, oregano, cumin, and paprika. Or try adding flavor with lemon or lime zest and juice.    Saturated fat. Saturated fats are most often found in animal products such as beef, pork, and chicken. They are often solid at room temperature, such as butter. To reduce your saturated fat intake, choose leaner cuts of meat and poultry. And try healthier cooking methods such as grilling, broiling, roasting, or baking. For a simple lower-fat swap, use plain nonfat yogurt instead of mayonnaise when making potato salad or  macaroni salad.    Added sugars. These are sugars added to foods. They are in foods such as ice cream, candy, soda, fruit drinks, sports drinks, energy drinks, cookies, pastries, jams, and syrups. Cut down on added sugars by sharing sweet treats with a family member or friend. You can also choose fruit for dessert, and drink water or other unsweetened beverages.     [employer] last reviewed this educational content on 6/1/2020 2000-2021 [employer]. All rights reserved. This information is not intended as a substitute for professional medical care. Always follow your healthcare professional's instructions.        Your Health Risk Assessment indicates you feel you are not in good emotional health.    Recreation   Recreation is not limited to sports and team events. It includes any activity that provides relaxation, interest, enjoyment, and exercise. Recreation provides an outlet for physical, mental, and social energy. It can give a sense of worth and achievement. It can help you stay healthy.    Mental Exercise and Social Involvement  Mental and emotional health is as important as physical health. Keep in touch with friends and family. Stay as active as possible. Continue to learn and challenge yourself.   Things you can do to stay mentally active are:    Learn something new, like a foreign language or musical instrument.     Play SCRABBLE or do crossword puzzles. If you cannot find people to play these games with you at home, you can play them with others on your computer through the Internet.     Join a games club--anything from card games to chess or checkers or lawn bowling.     Start a new hobby.     Go back to school.     Volunteer.     Read.   Keep up with world events.    Depression and Suicide in Older Adults    Nearly 2 million older Americans have some type of depression. Some of them even take their own lives. Yet depression among older adults is often ignored. Learn the warning signs. You  "may help spare a loved one needless pain. You may also save a life.   What is depression?  Depression is a common and serious illness that affects the way you think and feel. It is not a normal part of aging, nor is it a sign of weakness, a character flaw, or something you can snap out of. Most people with depression need treatment to get better. The most common symptom is a feeling of deep sadness. People who are depressed also may seem tired and listless. And nothing seems to give them pleasure. It s normal to grieve or be sad sometimes. But sadness lessens or passes with time. Depression rarely goes away or improves on its own. A person with clinical depression can't \"snap out of it.\" Other symptoms of depression are:     Sleeping more or less than normal    Eating more or less than normal    Having headaches, stomachaches, or other pains that don t go away    Feeling nervous,  empty,  or worthless    Crying a great deal    Thinking or talking about suicide or death    Loss of interest in activities previously enjoyed    Social isolation    Feeling confused or forgetful  What causes it?  The causes of depression aren t fully known. But it is thought to result from a complex blend of these factors:     Biochemistry. Certain chemicals in the brain play a role.    Genes. Depression does run in families.    Life stress. Life stresses can also trigger depression in some people. Older adults often face many stressors, such as death of friends or a spouse, health problems, and financial concerns.    Chronic conditions. This includes conditions such as diabetes, heart disease, or cancer. These can cause symptoms of depression. Medicine side effects can cause changes in thoughts and behaviors.  How you can help  Often, depressed people may not want to ask for help. When they do, they may be ignored. Or, they may receive the wrong treatment. You can help by showing parents and older friends love and support. If they seem " depressed, don t lecture the person, ignore the symptoms, or discount the symptoms as a  normal  part of aging -which they are not. Get involved, listen, and show interest and support.   Help them understand that depression is a treatable illness. Tell them you can help them find the right treatment. Offer to go to their healthcare provider's appointment with them for support when the symptoms are discussed. With their approval, contact a local mental health center, social service agency, or hospital about services.   You can be an advocate for him or her at healthcare appointments. Many older adults have chronic illnesses that can cause symptoms of depression. Medicine side effects can change thoughts and behaviors. You can help make sure that the healthcare provider looks at all of these factors. He or she should refer your family member or friend to a mental healthcare provider when needed. in some cases, untreated depression can lead to a misdiagnosis. A person may be diagnosed with a brain disorder such as dementia. If the healthcare provider does not take the issue of depression seriously, help your family member or friend to find another provider.   Don't be afraid to ask  If you think an older person you care about could be suicidal, ask,  Have you thought about suicide?  Most people will tell you the truth. If they say  yes,  they may already have a plan for how and when they will attempt it. Find out as much as you can. The more detailed the plan, and the easier it is to carry out, the more danger the person is in right now. Tell the person you are there for them and do not want them to harm him or herself. Don't wait to get help for the person. Call the person's healthcare provider, local hospital, or emergency services.   To learn more    National Suicide Prevention Lifeline (crisis hotline) 558-897-SRTV (728-353-2197)    National [place] of Mental Usghgh895-858-4751gab.[facility].nih.gov    National  [place] on Mental Msgkhwi111-138-1476qsu.lora.org    Mental Health Srghxyk531-844-2247nnx.[facility].org    National Suicide Deushwj935-UGVCPRK (650-661-6197)    Call 911  Never leave the person alone. A person who is actively suicidal needs psychiatric care right away. They will need constant supervision. Never leave the person out of sight. Call 911 or the national 24-hour suicide crisis hotline at 374-617-NPXY (555-917-8977). You can also take the person to the closest emergency room.   [employer] last reviewed this educational content on 5/1/2020 2000-2021 [employer]. All rights reserved. This information is not intended as a substitute for professional medical care. Always follow your healthcare professional's instructions.

## 2022-12-14 NOTE — TELEPHONE ENCOUNTER
"Requested Prescriptions   Pending Prescriptions Disp Refills     levothyroxine (SYNTHROID/LEVOTHROID) 75 MCG tablet  Last Written Prescription Date:  10/17/18  Last Fill Quantity: 90 tablet,  # refills: 0   Last office visit: 1/3/2019 with prescribing provider:  Dr. Lyles   Future Office Visit:   Next 5 appointments (look out 90 days)    Jan 23, 2019  9:00 AM CST  [facility] [address] [place] [id number] [phone]   Jan 30, 2019  1:20 PM CST  Return Visit with Oneil Baxter MD  [facility] ([facility] [address] [place] [id number] [phone]   Feb 12, 2019  2:00 PM CST  SHORT with Radha Mcdonald [facility] ([facility] [address] [place] [id number] [phone]          90 tablet 0     Sig: TAKE 1 TABLET (75 MCG) BY MOUTH EVERY MORNING.    Thyroid Protocol Passed - 1/11/2019  1:36 PM       Passed - Patient is 12 years or older       Passed - Recent (12 mo) or future (30 days) visit within the authorizing provider's specialty    Patient had office visit in the last 12 months or has a visit in the next 30 days with authorizing provider or within the authorizing provider's specialty.  See \"Patient Info\" tab in inbasket, or \"Choose Columns\" in Meds & Orders section of the refill encounter.             Passed - Medication is active on med list       Passed - Normal TSH on file in past 12 months    Recent Labs   Lab Test 12/24/18  1114   TSH 2.63                " Hemostasis: Drysol

## 2022-12-14 NOTE — PROGRESS NOTES
"SUBJECTIVE:   Tito is a 47 year old who presents for Preventive Visit.  Patient has been advised of split billing requirements and indicates understanding: Yes  Are you in the first 12 months of your Medicare coverage?  No    Healthy Habits:     In general, how would you rate your overall health?  Fair    Frequency of exercise:  None    Do you usually eat at least 4 servings of fruit and vegetables a day, include whole grains    & fiber and avoid regularly eating high fat or \"junk\" foods?  No    Taking medications regularly:  Yes    Medication side effects:  None    Ability to successfully perform activities of daily living:  No assistance needed    Home Safety:  No safety concerns identified    Hearing Impairment:  No hearing concerns    In the past 6 months, have you been bothered by leaking of urine?  No    In general, how would you rate your overall mental or emotional health?  Fair      PHQ-2 Total Score: 4    Additional concerns today:  No      Have you ever done Advance Care Planning? (For example, a Health Directive, POLST, or a discussion with a medical provider or your loved ones about your wishes): No, advance care planning information given to patient to review.  Patient declined advance care planning discussion at this time.       Fall risk       Cognitive Screening   1) Repeat 3 items (Leader, Season, Table)    2) Clock draw: NORMAL  3) 3 item recall: Recalls 2 objects   Results: NORMAL clock, 1-2 items recalled: COGNITIVE IMPAIRMENT LESS LIKELY    Mini-CogTM Copyright S Candida. Licensed by the author for use in [facility]; reprinted with permission (nghia@.[email or web address]). All rights reserved.  Answers for HPI/ROS submitted by the patient on 12/7/2022  If you checked off any problems, how difficult have these problems made it for you to do your work, take care of things at home, or get along with other people?: Very difficult  PHQ9 TOTAL SCORE: 13  YUDI 7 TOTAL SCORE: 12        Do you have sleep " apnea, excessive snoring or daytime drowsiness?: yes sleep apnea    Reviewed and updated as needed this visit by clinical staff   Tobacco  Allergies  Meds  Problems  Med Hx  Surg Hx  Fam Hx          Reviewed and updated as needed this visit by Provider   Tobacco  Allergies  Meds  Problems  Med Hx  Surg Hx  Fam Hx         Social History     Tobacco Use     Smoking status: Never     Smokeless tobacco: Never   Substance Use Topics     Alcohol use: Not Currently     Comment: occ 1/week     If you drink alcohol do you typically have >3 drinks per day or >7 drinks per week? No    Alcohol Use 12/14/2022   Prescreen: >3 drinks/day or >7 drinks/week? -   Prescreen: >3 drinks/day or >7 drinks/week? No         Current providers sharing in care for this patient include:   Patient Care Team:  Ksenia Lyles MD as PCP - General (Family Practice)  Radha Mcdonald RPH as Pharmacist (Pharmacy)  Homa Davis (Inactive) as Psychiatrist  System, Provider Not In as Therapist (Clinic)  Ericka Mora RD as Diabetes Educator (Dietitian, Registered)  Radha Mcdonald RPH as MTM Pharamcist (Pharmacist)  Joel Damico MD as Assigned Sleep Provider  Peng Perez DPM as Assigned Musculoskeletal Provider  Sue Gunter MD as Assigned Pulmonology Provider  Ksenia Lyles MD as Assigned PCP  Oneil Baxter MD as Assigned Rheumatology Provider  Ramakrishna Badillo MD as MD (Neurology)  Anitha Farrell APRN CNP as Referring Physician (Colon & Rectal)  Janice Gonzales MD as MD (Dermatology)  Nixon Gallagher MD as MD (Neurological Surgery)  Nixon Gallagher MD as Assigned Neuroscience Provider  Drake Tilley MD as Assigned Surgical Provider  Radha Mcdonald RPH as Assigned MTM Pharmacist  Stacia Jack APRN CNP as Nurse Practitioner (Pain Medicine)  Abbie Cuenca MD as Assigned Endocrinology Provider    The following health maintenance  items are reviewed in Epic and correct as of today:  Health Maintenance   Topic Date Due     DIABETIC FOOT EXAM  08/10/2022     TSH W/FREE T4 REFLEX  02/10/2023     PHQ-9  03/14/2023     A1C  03/23/2023     BMP  04/10/2023     LIPID  04/21/2023     EYE EXAM  04/22/2023     ASTHMA CONTROL TEST  06/14/2023     ANNUAL REVIEW OF HM ORDERS  07/12/2023     ASTHMA ACTION PLAN  07/19/2023     TREATMENT AGREEMENT FOR CHRONIC PAIN MANAGEMENT  09/20/2023     MICROALBUMIN  09/23/2023     URINE DRUG SCREEN  09/23/2023     MEDICARE ANNUAL WELLNESS VISIT  12/14/2023     YUDI ASSESSMENT  12/14/2023     COLORECTAL CANCER SCREENING  09/15/2025     ADVANCE CARE PLANNING  12/14/2027     DTAP/TDAP/TD IMMUNIZATION (4 - Td or Tdap) 01/23/2030     Pneumococcal Vaccine: Pediatrics (0 to 5 Years) and At-Risk Patients (6 to 64 Years) (4 - PPSV23 if available, else PCV20) 07/01/2038     HEPATITIS C SCREENING  Completed     INFLUENZA VACCINE  Completed     ZOSTER IMMUNIZATION  Completed     HEPATITIS B IMMUNIZATION  Completed     COVID-19 Vaccine  Completed     HIV SCREENING  Addressed     IPV IMMUNIZATION  Aged Out     MENINGITIS IMMUNIZATION  Aged Out     Labs reviewed in EPIC  BP Readings from Last 3 Encounters:   12/14/22 123/86   11/11/22 123/84   11/11/22 127/83    Wt Readings from Last 3 Encounters:   12/14/22 139.3 kg (307 lb 1.6 oz)   11/11/22 138.6 kg (305 lb 9.6 oz)   11/10/22 138.8 kg (306 lb)            Here today for routine wellness exam and follow-up on ongoing chronic issues.  Sees multiple specialists as well and interval history is reviewed with patient and in his chart.    Review of Systems   Constitutional: Negative for chills and fever.   HENT: Positive for congestion. Negative for ear pain, hearing loss and sore throat.    Eyes: Negative for pain and visual disturbance.   Respiratory: Negative for cough and shortness of breath.    Cardiovascular: Negative for chest pain, palpitations and peripheral edema.  "  Gastrointestinal: Positive for constipation and nausea. Negative for abdominal pain, diarrhea, heartburn and hematochezia.   Genitourinary: Positive for impotence. Negative for dysuria, frequency, genital sores, hematuria, penile discharge and urgency.   Musculoskeletal: Positive for arthralgias and myalgias. Negative for joint swelling.   Skin: Negative for rash.   Neurological: Positive for dizziness and headaches. Negative for weakness and paresthesias.   Psychiatric/Behavioral: Positive for mood changes. The patient is nervous/anxious.        OBJECTIVE:   /86   Pulse 81   Temp 99.3  F (37.4  C) (Oral)   Resp 18   Ht 1.956 m (6' 5\")   Wt 139.3 kg (307 lb 1.6 oz)   SpO2 95%   BMI 36.42 kg/m   Estimated body mass index is 36.42 kg/m  as calculated from the following:    Height as of this encounter: 1.956 m (6' 5\").    Weight as of this encounter: 139.3 kg (307 lb 1.6 oz).  Physical Exam  GENERAL: healthy, alert and no distress  EYES: Eyes grossly normal to inspection, PERRL and conjunctivae and sclerae normal  HENT: ear canals and TM's normal, nose and mouth without ulcers or lesions  NECK: no adenopathy, no asymmetry, masses, or scars and thyroid normal to palpation  RESP: lungs clear to auscultation - no rales, rhonchi or wheezes  CV: regular rate and rhythm, normal S1 S2, no S3 or S4, no murmur, click or rub, no peripheral edema and peripheral pulses strong  ABDOMEN: soft, nontender, no hepatosplenomegaly, no masses and bowel sounds normal  MS: no gross musculoskeletal defects noted, no edema  SKIN: no suspicious lesions or rashes  NEURO: Normal strength and tone, mentation intact and speech normal  PSYCH: mentation appears normal, affect normal/bright    Diagnostic Test Results:  Labs reviewed in Epic    ASSESSMENT / PLAN:   (Z00.00) Encounter for Medicare annual wellness exam  (primary encounter diagnosis)  Comment: Routine health maintenance up-to-date otherwise  Plan:     (E11.8) Type 2 " diabetes mellitus with complication, without long-term current use of insulin (H)  Comment: A1c has been well controlled  Plan:     (E66.01) Morbid obesity (H)  Comment: On Ozempic and working on better diet  Plan:     (I10) Essential hypertension with goal blood pressure less than 140/90  Comment: Stable on current regimen.  Continue same plan and routine follow-up.  Need to walk a fine balance with his POTS as well  Plan: metoprolol succinate ER (TOPROL XL) 200 MG 24         hr tablet            (E78.5) Hyperlipidemia LDL goal <70  Comment: Stable on current regimen.  Continue same plan and routine follow-up.   Plan:     (E03.9) Acquired hypothyroidism  Comment:   Plan: levothyroxine (SYNTHROID/LEVOTHROID) 75 MCG         tablet            (M45.8) Ankylosing spondylitis of sacral region (H)  Comment:   Plan: oxyCODONE (ROXICODONE) 5 MG tablet            (F31.81) Bipolar 2 disorder (H)  Comment:   Plan: Stable and following with psychiatry    (F33.2) MDD (major depressive disorder), recurrent severe, without psychosis (H)  Comment: As above  Plan:     (G89.4) Chronic pain syndrome  Comment: Up-to-date on routine monitoring  Plan:     (E53.8) B12 deficiency  Comment:   Plan: cyanocobalamin (CYANOCOBALAMIN) 1000 MCG/ML         injection            (E78.1) Hypertriglyceridemia  Comment:   Plan: fenofibrate (TRICOR) 48 MG tablet, rosuvastatin        (CRESTOR) 40 MG tablet            (M51.36) DDD (degenerative disc disease), lumbar  Comment:   Plan: oxyCODONE (ROXICODONE) 5 MG tablet              Patient has been advised of split billing requirements and indicates understanding: Yes      COUNSELING:  Reviewed preventive health counseling, as reflected in patient instructions       Regular exercise       Healthy diet/nutrition       Vision screening       Hearing screening       Dental care        He reports that he has never smoked. He has never used smokeless tobacco.      Appropriate preventive services were discussed  with this patient, including applicable screening as appropriate for cardiovascular disease, diabetes, osteopenia/osteoporosis, and glaucoma.  As appropriate for age/gender, discussed screening for colorectal cancer, prostate cancer, breast cancer, and cervical cancer. Checklist reviewing preventive services available has been given to the patient.    Reviewed patients plan of care and provided an AVS. The Basic Care Plan (routine screening as documented in Health Maintenance) for Joel meets the Care Plan requirement. This Care Plan has been established and reviewed with the Patient.          Ksenia Lyles MD  [facility]    Identified Health Risks:

## 2022-12-16 NOTE — TELEPHONE ENCOUNTER
Faxed patient & provider forms, along with drug allergies 12/16/22    Paul Perdomo [facility]   Specialty/Endo Pharmacy Liaison [place]  P [phone]  F [phone]

## 2022-12-16 NOTE — TELEPHONE ENCOUNTER
I called and spoke to the patient about the increase in dream enactment.  Of note he has subsequently, with his psychiatrists permission, increased his clonazepam to 1.0mg and has been without morning grogginess with this change.  And he suspects it has also helped with the dream enactment which is encouraging.     Will follow.

## 2022-12-23 NOTE — TELEPHONE ENCOUNTER
Farida Poe [facility]  [facility] Liaison  [email or web address]  Phone: [phone]  Fax: [phone]

## 2023-01-08 ASSESSMENT — PAIN SCALES - PAIN ENJOYMENT GENERAL ACTIVITY SCALE (PEG)
INTERFERED_GENERAL_ACTIVITY: 9
AVG_PAIN_PASTWEEK: 5
INTERFERED_ENJOYMENT_LIFE: 7
PEG_TOTALSCORE: 7

## 2023-01-09 ENCOUNTER — OFFICE VISIT (OUTPATIENT)
Dept: PALLIATIVE MEDICINE | Facility: CLINIC | Age: 48
End: 2023-01-09
Attending: NURSE PRACTITIONER
Payer: MEDICARE

## 2023-01-09 VITALS — HEART RATE: 73 BPM | SYSTOLIC BLOOD PRESSURE: 129 MMHG | DIASTOLIC BLOOD PRESSURE: 85 MMHG

## 2023-01-09 DIAGNOSIS — M62.838 MUSCLE SPASM: ICD-10-CM

## 2023-01-09 DIAGNOSIS — M79.18 MYOFASCIAL MUSCLE PAIN: ICD-10-CM

## 2023-01-09 PROCEDURE — 20552 NJX 1/MLT TRIGGER POINT 1/2: CPT | Performed by: PAIN MEDICINE

## 2023-01-09 RX ORDER — BUPIVACAINE HYDROCHLORIDE 2.5 MG/ML
10 INJECTION, SOLUTION EPIDURAL; INFILTRATION; INTRACAUDAL ONCE
Status: COMPLETED | OUTPATIENT
Start: 2023-01-09 | End: 2023-01-09

## 2023-01-09 RX ORDER — TRIAMCINOLONE ACETONIDE 40 MG/ML
40 INJECTION, SUSPENSION INTRA-ARTICULAR; INTRAMUSCULAR ONCE
Status: COMPLETED | OUTPATIENT
Start: 2023-01-09 | End: 2023-01-09

## 2023-01-09 RX ADMIN — TRIAMCINOLONE ACETONIDE 40 MG: 40 INJECTION, SUSPENSION INTRA-ARTICULAR; INTRAMUSCULAR at 14:03

## 2023-01-09 RX ADMIN — BUPIVACAINE HYDROCHLORIDE 25 MG: 2.5 INJECTION, SOLUTION EPIDURAL; INFILTRATION; INTRACAUDAL at 14:03

## 2023-01-09 ASSESSMENT — PAIN SCALES - GENERAL: PAINLEVEL: MODERATE PAIN (4)

## 2023-01-09 NOTE — PATIENT INSTRUCTIONS
[facility]  Post Procedure Instructions    Today you had:  trigger point injections   occipital nerve block   bursa injection  Joint Injection    Medications used:  lidocaine   bupivicaine   kenolog   dexamethasone        Go to the emergency room if you develop any shortness of breath  Monitor the injection sites for signs and symptoms of infection-fever, chills, redness, swelling, warmth, or drainage to areas.  You may have soreness at injection sites for up to 24 hours.  It may take up to 14 days for the steroid medication to start working although you may feel the effect as early as a few days after the procedure.     You may apply ice to the painful areas to help minimize the discomfort of the needle pokes.  Do not apply heat to sites for at least 12 hours.  You may use anti-inflammatory medications or Tylenol for pain control if necessary    Pain Clinic phone number during work hours (Monday through Friday 8 am-4:30 pm) at [phone] or the Provider Line after hours at [phone]:

## 2023-01-09 NOTE — PROGRESS NOTES
Joel was seen today for pain.    Diagnoses and all orders for this visit:    Muscle spasm  -     PAIN INJECTION EVAL/TREAT/FOLLOW UP  -     triamcinolone (KENALOG-40) injection 40 mg  -     bupivacaine (MARCAINE) 0.25% preservative free injection  -     NO CHARGE LOS    Myofascial muscle pain  -     PAIN INJECTION EVAL/TREAT/FOLLOW UP  -     triamcinolone (KENALOG-40) injection 40 mg  -     bupivacaine (MARCAINE) 0.25% preservative free injection  -     NO CHARGE LOS        Trigger points were identified by patient, and marked when appropriate.  The area was prepped with Chloroprep.    Using clean technique, injections were completed using a 25G, 3.5 inch needle.  After negative aspiration, injection was completed.  A total of 5 locations were injected.  When possible, tissue was retracted from the chest wall to avoid lung injury.    Muscle groups injected:  Bilateral quadratus lumborum  latissimus dorsi, paraspinal muscles     Injection solution contained:  9ml of 0.5% bupivacaine and 40mg of Kenalog        Bubba Montgomery MD  [facility]

## 2023-01-10 ENCOUNTER — MYC MEDICAL ADVICE (OUTPATIENT)
Dept: RHEUMATOLOGY | Facility: CLINIC | Age: 48
End: 2023-01-10

## 2023-01-10 ENCOUNTER — MYC REFILL (OUTPATIENT)
Dept: FAMILY MEDICINE | Facility: CLINIC | Age: 48
End: 2023-01-10

## 2023-01-10 DIAGNOSIS — M45.8 ANKYLOSING SPONDYLITIS OF SACRAL REGION (H): ICD-10-CM

## 2023-01-10 DIAGNOSIS — M51.369 DDD (DEGENERATIVE DISC DISEASE), LUMBAR: ICD-10-CM

## 2023-01-10 RX ORDER — OXYCODONE HYDROCHLORIDE 5 MG/1
5 TABLET ORAL EVERY 6 HOURS PRN
Qty: 35 TABLET | Refills: 0 | Status: SHIPPED | OUTPATIENT
Start: 2023-01-10 | End: 2023-01-31

## 2023-01-10 NOTE — TELEPHONE ENCOUNTER
RN attempted to call pharmacy as patient should have refills on file. Waited on hold for 35 minutes before needing to disconnect    Magnus Tenorio RN 1/10/2023 3:45 PM

## 2023-01-21 ASSESSMENT — ANXIETY QUESTIONNAIRES
5. BEING SO RESTLESS THAT IT IS HARD TO SIT STILL: SEVERAL DAYS
6. BECOMING EASILY ANNOYED OR IRRITABLE: SEVERAL DAYS
4. TROUBLE RELAXING: MORE THAN HALF THE DAYS
IF YOU CHECKED OFF ANY PROBLEMS ON THIS QUESTIONNAIRE, HOW DIFFICULT HAVE THESE PROBLEMS MADE IT FOR YOU TO DO YOUR WORK, TAKE CARE OF THINGS AT HOME, OR GET ALONG WITH OTHER PEOPLE: VERY DIFFICULT
1. FEELING NERVOUS, ANXIOUS, OR ON EDGE: MORE THAN HALF THE DAYS
2. NOT BEING ABLE TO STOP OR CONTROL WORRYING: MORE THAN HALF THE DAYS
GAD7 TOTAL SCORE: 11
7. FEELING AFRAID AS IF SOMETHING AWFUL MIGHT HAPPEN: SEVERAL DAYS
8. IF YOU CHECKED OFF ANY PROBLEMS, HOW DIFFICULT HAVE THESE MADE IT FOR YOU TO DO YOUR WORK, TAKE CARE OF THINGS AT HOME, OR GET ALONG WITH OTHER PEOPLE?: VERY DIFFICULT
GAD7 TOTAL SCORE: 11
3. WORRYING TOO MUCH ABOUT DIFFERENT THINGS: MORE THAN HALF THE DAYS
7. FEELING AFRAID AS IF SOMETHING AWFUL MIGHT HAPPEN: SEVERAL DAYS

## 2023-01-21 ASSESSMENT — PAIN SCALES - PAIN ENJOYMENT GENERAL ACTIVITY SCALE (PEG)
PEG_TOTALSCORE: 5.33
INTERFERED_GENERAL_ACTIVITY: 6
INTERFERED_GENERAL_ACTIVITY: 6
PEG_TOTALSCORE: 5.33
INTERFERED_ENJOYMENT_LIFE: 6
AVG_PAIN_PASTWEEK: 4
INTERFERED_ENJOYMENT_LIFE: 6
AVG_PAIN_PASTWEEK: 4

## 2023-01-22 DIAGNOSIS — M51.369 DDD (DEGENERATIVE DISC DISEASE), LUMBAR: ICD-10-CM

## 2023-01-23 RX ORDER — PREGABALIN 150 MG/1
CAPSULE ORAL
Qty: 270 CAPSULE | Refills: 0 | Status: SHIPPED | OUTPATIENT
Start: 2023-01-23 | End: 2023-05-30

## 2023-01-23 NOTE — PROGRESS NOTES
[facility]      Joel Pineda is a 47 year old male who is being evaluated via a billable virtual visit.      VIDEO VISIT   How would you like to obtain your AVS? MyChart  If you are dropped from the video visit, the video invite should be resent to: Text to cell phone: [phone]  Will anyone else be joining your video visit? NO,  Is patient CURRENTLY in [place]? YES  If patient encounters technical issues they should call [phone]    Video-Visit Details  Type of service:  Video Visit  Video Start Time: 1:50 PM  Video End Time: 2:15 PM  Total Face to Face Time: 25 minutes   Originating Location (pt. Location): Home  Distant Location (provider location):  [facility] [employer]   Platform used for Video Visit: Lizhi      PEG Score 8/11/2022 9/15/2022 1/24/2023   PEG Total Score 6.67 4.33 6.67          CHIEF COMPLAINT: Chronic pain    INTERVAL HISTORY:  Last seen on 11/10/22.        Recommendations/plan at the last visit included:  1. 30 minutes Video or Clinic follow-up with JOCELYN Zavala NPAIDAN in 2 months .   2. Procedures recommended:   1. We will call you about scheduling the RFA. If you don't hear by early next week, call the clinic at [phone]  2. Trigger point injections  3. Medication Management : Methocarbamol refilled     Since last visit:   - Fell on ice, landing on forearm, landed on knees. Generalized pain is flared up.   - Having sustained pain follow HS surgery     Pain Information Today: Score: Severe Pain (6)/10. Location of pain:low back   Answers for HPI/ROS submitted by the patient on 1/21/2023  YUDI 7 TOTAL SCORE: 11    Annual Requirements last collected:  N/A     Current Pain Relevant Medications:    Acetaminophen 500 mg BID & with Oxycodone.   Cymbalta 120 mg in AM  Lyrica 150 mg BID  Methocarbamol 500 mg 1-2 QID PRN  Nabumetone 500 mg 1-2 times a day  Lidocaine gel topically 2-3 times daily     Pain Related Controlled Substances:   Clonazepam  0.5 mg at HS. Occasionally 1 tab during the day.  Lunesta 3 mg  Oxycodone 5 mg 1-2 tabs per day PRN              Total opiate dose: 7.5-15 MME     Previous Pain Relevant Medications: (H--helped; HI--Helped initially; SWH--Somewhat helpful; NH--No help; W--worse; SE--side effects; ?--Unsure if helpful)   NOTE: This medication information taken from patient's intake form, not medical records.   Opiates: Oxycodone: H, Tramadol:[facility]. SE, Codeine: NH  NSAIDS: Celebrex: [facility], Nabumetone:H, Naproxen: SE  Anti-migraine medications: Midrin? , Maxalt:H  Muscle Relaxants: Baclofen:[facility], Flexeril:H, Tizaidine:?, Methocarbamol:?  Neuropathics: Lyrica:H  Anti-depressants: Abilify:SE, Brintellix: NH, Wellbutrin: ?, Cymbalta: NH, Nortriptyline: NH, Seroquel:   [facility], Risperdal: NH, Effexor:?, Cymbalta ?              Anxiety medications: Xanax: H, Klonpin: H, lorazepam: H                  Topicals: Lidocaine:H              Sleep medications:Belsomra: NH, Melatonin:H, Trazodone NH, Ambien:NH              Other medications not covered above: Humira: All, Enbrel; H, dicyclomine:H, Medrol:[facility], Prednisone:H  This will be added at a later date when new patient packet is available.      Any illicit drug use: denies   EtOH use: none   Caffeine use: 6-8 per day   Nicotine use: None     Past Pain Treatments:               Pain Clinic:   Yes    [facility] pain management: For spinal injections with Dr Diaz, [facility]: injections, nerve ablation, [facility] for SCS treatment.  Did trial but did not find it beneficial.   [facility] chronic pain program.                   PT: Yes  For other reasons. Neck, back and feet              Psychologist: Yes ongoing with private therapist.at  [facility].               Chiropractor: Yes years ago              Acupuncture: Yes NH              Pharmacotherapy:                           Opioids: Yes                            Non-opioids:    Yes               TENs Unit:Yes H for back               Injections: Yes Many  for neck and back               Surgeries related to pain: Yes               October 2007 L5-S1 laminectomy, micro discectomy          THE 4 As OF OPIOID MAINTENANCE ANALGESIA    Analgesia: Is pain relief clinically significant? YES   Activity: Is patient functional and able to perform Activities of Daily Living? YES   Adverse effects: Is patient free from adverse side effects from opiates? YES   Adherence to Rx protocol: Is patient adhering to Controlled Substance Agreement and taking medications ONLY as ordered? YES     Is Narcan prescribed for opiate use >50 MME daily or concurrent use of opiates and benzodiazepines?  Yes prescribed by this writer 8/10/2020    [place] Board of Pharmacy Data Base Reviewed:    YES; As expected, no concern for misuse/abuse of controlled medications based on this report. Reviewed [facility] January 23, 2023- no concerning fills.    _______________________________________________      Physical Exam not completed due to virtual visit.     General: Verbal, alert and no distress   Psychiatric:  affect and mood normal, mentation appears normal.     DIRE Score for ongoing opioid management is calculated as follows:    Diagnosis = 3 pts (advanced condition; severe pain/objective findings)    Intractability = 3 pts (patient fully engaged but inadequate response to treatments)    Risk        Psych = 3 pts (no significant personality dysfunction/mental illness; good communication with clinic)         Chem Hlth = 3 pts (no history of chemical dependency; not drug-focused)       Reliability = 2 pts (occasional difficulties with compliance; generally reliable)       Social = 2 pts (reduction in some relationships/life rolls)       (Psych + Chem hlth + Reliability + Social) = 16    Efficacy = 2 pts (moderate benefit/function; low med dose; too early/not tried meds)    DIRE Score = 18        7-13: likely NOT suitable candidate for long-term opioid analgesia       14-21: may be a suitable candidate  for long-term opioid analgesia     Previous Diagnostic Tests:   Imaging Studies:   MR LUMBAR SPINE W/O CONTRAST 6/27/2019  Impression:   1. Multilevel lumbar spondylosis, most pronounced at L5-S1 with  moderate to severe left and moderate right neural foraminal stenosis as well as narrowing of the left lateral recess and abutment the traversing left S1 nerve root.   2. Additional multilevel degenerative changes of the lumbar spine as described above.     PAIN RELEVANT CONDITIONS:   1.  Postherpetic neuralgia  2.  Ankylosing spondylosis, Lumbar DDD with left S1 nerve involvement, lumbar stenosis  3.  Chronic migraine headaches  4.  History: Complex medical issues, see history    ASSESSMENT AND PLAN    (N50.811) Testicular pain, right  (primary encounter diagnosis)  (S31.809S) Gluteal cleft wound, unspecified laterality, sequela  Comment: Will have Tito see Dr Montgomery for consultation regarding possible block injection  Plan: PAIN INJECTION EVAL/TREAT/FOLLOW UP    PATIENT INSTRUCTIONS:     Diagnosis reviewed, treatment option addressed, and risk/benefits discussed.  Self-care instructions given.  I am recommending a multidisciplinary treatment plan to help this patient better manage pain.    Remember to request ALL medication refills 5-7 BUSINESS days before you run out.       1. Follow-up with JOCELYN Zavala, NP-C   1. Video appt in March, 30 minutes   2. Clinic appt in May at [facility] or [facility], 30 minutes   2. Referrals: Consult Only appt with Dr Montgomery to discuss block injections or other options for gluteal cleft pain and right testicular pain  3. Medication Management : Continue current plan of care.     I have reviewed the note as documented above.  This accurately captures the substance of my conversation with the patient.    BILLING TIME DOCUMENTATION:   TOTAL TIME on the date of service includes:   Time spent preparing to see the patient: 0 minutes (reviewing records and tests)  Time  spend face to face with the patient: 25 minutes  Time spent ordering tests, medications, procedures and referrals: 0 minutes  Time spent Referring and communicating with other healthcare professionals: 0 minutes  Documenting clinical information in Epic: 3 minutes    The total TIME spent on this patient on the day of the appointment was 28 minutes.     JOCELYN Padgett, NP-C  [facility]    (Information in italics and blue color are taken from previous pain and consulting medical providers notes and are documented as such)

## 2023-01-24 ENCOUNTER — VIRTUAL VISIT (OUTPATIENT)
Dept: PHARMACY | Facility: CLINIC | Age: 48
End: 2023-01-24
Payer: COMMERCIAL

## 2023-01-24 ENCOUNTER — VIRTUAL VISIT (OUTPATIENT)
Dept: PALLIATIVE MEDICINE | Facility: CLINIC | Age: 48
End: 2023-01-24
Payer: MEDICARE

## 2023-01-24 DIAGNOSIS — J45.31 MILD PERSISTENT ASTHMA WITH ACUTE EXACERBATION: ICD-10-CM

## 2023-01-24 DIAGNOSIS — M45.8 ANKYLOSING SPONDYLITIS OF SACRAL REGION (H): ICD-10-CM

## 2023-01-24 DIAGNOSIS — E53.8 B12 DEFICIENCY: ICD-10-CM

## 2023-01-24 DIAGNOSIS — F41.9 ANXIETY: ICD-10-CM

## 2023-01-24 DIAGNOSIS — J30.2 SEASONAL ALLERGIC RHINITIS, UNSPECIFIED TRIGGER: ICD-10-CM

## 2023-01-24 DIAGNOSIS — E55.9 VITAMIN D DEFICIENCY: ICD-10-CM

## 2023-01-24 DIAGNOSIS — N50.811 TESTICULAR PAIN, RIGHT: Primary | ICD-10-CM

## 2023-01-24 DIAGNOSIS — F41.8 DEPRESSION WITH ANXIETY: ICD-10-CM

## 2023-01-24 DIAGNOSIS — Z78.9 TAKES DIETARY SUPPLEMENTS: ICD-10-CM

## 2023-01-24 DIAGNOSIS — S31.809S GLUTEAL CLEFT WOUND, UNSPECIFIED LATERALITY, SEQUELA: ICD-10-CM

## 2023-01-24 DIAGNOSIS — I10 ESSENTIAL HYPERTENSION WITH GOAL BLOOD PRESSURE LESS THAN 140/90: ICD-10-CM

## 2023-01-24 DIAGNOSIS — G47.00 INSOMNIA, UNSPECIFIED TYPE: ICD-10-CM

## 2023-01-24 DIAGNOSIS — G89.4 CHRONIC PAIN SYNDROME: ICD-10-CM

## 2023-01-24 DIAGNOSIS — E78.5 HYPERLIPIDEMIA LDL GOAL <70: ICD-10-CM

## 2023-01-24 DIAGNOSIS — K21.9 GASTROESOPHAGEAL REFLUX DISEASE WITHOUT ESOPHAGITIS: ICD-10-CM

## 2023-01-24 DIAGNOSIS — E03.9 ACQUIRED HYPOTHYROIDISM: ICD-10-CM

## 2023-01-24 DIAGNOSIS — E11.9 TYPE 2 DIABETES MELLITUS WITHOUT COMPLICATION, WITHOUT LONG-TERM CURRENT USE OF INSULIN (H): Primary | ICD-10-CM

## 2023-01-24 DIAGNOSIS — B02.9 HERPES ZOSTER WITHOUT COMPLICATION: ICD-10-CM

## 2023-01-24 DIAGNOSIS — K31.84 GASTROPARESIS: ICD-10-CM

## 2023-01-24 PROCEDURE — 99213 OFFICE O/P EST LOW 20 MIN: CPT | Mod: 95 | Performed by: NURSE PRACTITIONER

## 2023-01-24 PROCEDURE — 99207 PR NO CHARGE LOS: CPT | Performed by: PHARMACIST

## 2023-01-24 ASSESSMENT — PAIN SCALES - GENERAL: PAINLEVEL: SEVERE PAIN (6)

## 2023-01-24 NOTE — PATIENT INSTRUCTIONS
"Recommendations from today's MTM visit:                                                         1.  Could consider tapering off of omeprazole.  Decrease dose by 1 capsule weekly every week.  2. Orders placed for labs (Vitamin B12, D, lipids, TSH, BMP, A1c)    Follow-up: 3 months    It was great speaking with you today.  I value your experience and would be very thankful for your time in providing feedback in our clinic survey. In the next few days, you may receive an email or text message from [employer] with a link to a survey related to your  clinical pharmacist.\"     To schedule another MTM appointment, please call the clinic directly or you may call the MTM scheduling line at [phone] or toll-free at [phone].     My Clinical Pharmacist's contact information:                                                      Please feel free to contact me with any questions or concerns you have.      Radha Mcdonald, Pharm.D, BCACP  Medication Therapy Management Pharmacist      "

## 2023-01-24 NOTE — PROGRESS NOTES
Medication Therapy Management (MTM) Encounter    ASSESSMENT:                            Medication Adherence/Access: No issues reported    Mood/Anxiety/Insomnia:  Stable.  Follows closely with psychiatry.  Would benefit from establishing with a therapist.    Diabetes: Stable. meeting A1c goal of less than 7%.    Hypertension: Stable. meeting blood pressure goal less than 140/90 mmHg.  Due for updated BMP.    Asthma: Stable .ACT greater than 20    Hyperlipidemia: Stable.  Due for updated lipid labs.    Allergic Rhinitis: Stable.    GERD/Nausea/Gastroparesis: Could consider tapering off of PPI.    POTs: Stable.    Hypothyroidism: Stable.    Supplements: Stable.  Due for vitamin B12 and vitamin D labs in February.    Ankylosing Spondylitis: Stable.    Herpes Zoster: Stable.  PLAN:                            1.  Could consider tapering off of omeprazole.  Decrease dose by 1 capsule weekly every week.  2. Orders placed for labs (Vitamin B12, D, lipids, TSH, BMP, A1c)    Follow-up: 3 months    SUBJECTIVE/OBJECTIVE:                          Joel Pineda is a 47 year old male called for an initial visit for 2023. He was referred to me from Ksenia Lyles.      Reason for visit: Medication Review.    Allergies/ADRs: Reviewed in chart  Past Medical History: Reviewed in chart  Tobacco: He reports that he has never smoked. He has never used smokeless tobacco.  Alcohol: none  Caffeine: 4-5 cups of coffee/day  Activity: None    Medication Adherence/Access: no issues reported  Patient uses pill box(es).  Patient qualifies for [employer] program for Enbrel (delivers to his house) and Ozempic (ships to [facility] endocrinology clinic)    Mood/Anxiety/Insomnia: current therapy includes bupropion XL 150mg daily, duloxetine 120mg once daily, risperidone 0.5mg twice daily as needed (2 days a week will 1 tablet and once every 2 weeks will take 1mg), clonazepam 1mg at night for RBD, Lunesta 3mg at bedtime, melatonin 13mg at bedtime for  "RBD. Sleep has been pretty good. Mood has been \"not so hot\". Has been doing better with ADLs and housekeeping so thinks the bupropion is somewhat helpful. When he meets with psychiatry he does do therapy. Doesn't currently see a separate therapist.     PHQ 7/9/2022 8/25/2022 12/7/2022   PHQ-9 Total Score 9 6 13   Q9: Thoughts of better off dead/self-harm past 2 weeks Several days Several days Several days   F/U: Thoughts of suicide or self-harm Yes Yes Yes   F/U: Self harm-plan No No No   F/U: Self-harm action No No No   F/U: Safety concerns No No No   Some encounter information is confidential and restricted. Go to Review Flowsheets activity to see all data.      Patient's psychiatrist is Dr. Rodriguez at [facility] in [place].      Pain: current therapy includes acetaminophen 1000mg three times daily,  lyrica 150mg three times daily, oxycodone 5mg every 6 hours as needed maximum of 6 tablets/day (usually takes 5-7.5mg daily), methocarbamol 1000mg three times daily as needed (takes 3 times daily every day),  nabumetone 1000mg  twice daily (takes twice daily), Narcan nasal spray as needed, lidocaine ointment on neck and shoulders. Average pain is 4/10 sometimes it gets as high as 6/10. Follows with pain provider every 2-3 months.     Fell on driveway yesterday. Had just put salt/sand down and was carrying some groceries and slipped on that spot. Is sore from the fall.     Has nerve ablation on Friday with pain clinic. Takes maxalt twice a month. Migraines have improved with trigger point injections.     Diabetes:  Current therapy includes Ozempic 1mg weekly.  Has lost maybe 5 pounds on Ozempic.  SMBG: rarely.    Recent symptoms of low blood sugar? Does set his alarm to remind him to eat.   Recent symptoms of high blood sugar? none  Eye exam: up to date  Foot exam:  Up to date  ACEi/ARB: Yes: Ramipril.   Urine Albumin:   Lab Results   Component Value Date    MICROL 9 09/23/2022    MICROL 8 10/14/2020 "       Lab Results   Component Value Date    A1C 6.4 09/23/2022    A1C 6.3 02/10/2022    A1C 6.0 08/19/2021    A1C 6.5 02/10/2021    A1C 6.0 10/16/2020    A1C 6.1 08/18/2020    A1C 6.0 01/24/2020    A1C 5.9 09/13/2019     Hypertension: Current medications include ramipril 10mg daily, metoprolol XL 200mg twice daily. Patient does not self-monitor blood pressure.  Patient reports no current medication side effects.  BP Readings from Last 3 Encounters:   01/09/23 129/85   12/14/22 123/86   11/11/22 123/84     Asthma: Current medications: ICS/LABA- Advair HFA 45-21mcg 2 puff(s) twice daily (Discus causes hoarse voice)  Montelukast (Singulair) once daily  Short-Acting Bronchodilator: Albuterol MDI-uses prior to going out in cold air and prior to taking Enbrel. Patient rinses their mouth after using steroid inhaler. Triggers include: cold air and pollutants.  Patient reports the following symptoms: increased need of albuterol.  Asthma Action Plan on file: NO  ACT Total Scores 4/19/2022 6/8/2022 12/7/2022   ACT TOTAL SCORE - - -   ASTHMA ER VISITS - - -   ASTHMA HOSPITALIZATIONS - - -   ACT TOTAL SCORE (Goal Greater than or Equal to 20) 21 21 22   In the past 12 months, how many times did you visit the emergency room for your asthma without being admitted to the hospital? 0 0 0   In the past 12 months, how many times were you hospitalized overnight because of your asthma? 0 0 0     Hyperlipidemia: Current therapy includes rosuvstatin 40mg daily and Fenofibrate 48mg once daily, Lovaza 2grams twice daily.  Patient reports no significant myalgias or other side effects.  The 10-year ASCVD risk score (Katiana TORRES, et al., 2019) is: 6.5%    Values used to calculate the score:      Age: 47 years      Sex: Male      Is Non- : No      Diabetic: Yes      Tobacco smoker: No      Systolic Blood Pressure: 129 mmHg      Is BP treated: Yes      HDL Cholesterol: 38 mg/dL      Total Cholesterol: 155 mg/dL  Recent Labs    Lab Test 04/21/22  0853 02/10/22  0942 12/14/15  1016 12/03/14  0958   CHOL 155 188   < > 189   HDL 38* 29*   < > 27*   LDL 44 88   < > Cannot estimate LDL when triglyceride exceeds 400 mg/dL  108   TRIG 367* 545*   < > 487*   CHOLHDLRATIO  --   --   --  7.0*    < > = values in this interval not displayed.     Allergic Rhinitis: Current medications include cetirizine 10mg once daily, ophthalmic drops - Pataday 1 drop into both eyes once daily, Mucinex-D at night a couple times a month. Primary symptoms are sneezing and itchy/watery eyes. Patient feels that current therapy is effective.     GERD/Nausea/Gastroparesis: Current medications include: Prilosec (omeprazole) 20mg daily (not on days he takes pepcid)  and Pepcid (famotidine) 20mg prior to Enbrel and Ozempic to prevent rashes.  Pt reports no current symptoms.  Patient feels that current regimen is effective. Mecoclopramide 5mg four times daily as needed (tries to take 2.5 at a time),  marinol 2.5mg twice daily (makes him drowsy if he takes a higher dose)  Has been introducing more healthy foods. Eating more celery, carrots, pink lady apples. Wondering if he still needs omeprazole. Reports gastroparesis has improved since he started on Ozempic.    POTs- current therapy includes pyridostigmine 60mg three times daily, has been wearing an abdominal binder, 20-30mmHg compression knee highs. Increased salt in diet. Follows with [facility].    Hypothyroidism: Patient is taking levothyroxine 75 mcg daily. Patient is having the following symptoms: hypothyroidism -  none and hyperthyroidism -  none.   TSH   Date Value Ref Range Status   02/10/2022 2.59 0.40 - 4.00 mU/L Final   02/10/2021 2.11 0.40 - 4.00 mU/L Final     Supplements: Currently taking   Vitamin B12 1000mcg monthly-last B12 levels normal  Lab Results   Component Value Date    B12 674 02/10/2022     Vitamin D 1.25mg ([id number]) every 2 weeks last Vitamin D levels normal  Stress  Tab daily  Zinc/Vitamin C daily   No reported issues at this time.     Ankylosing Spondylitis: current medications include Enbrel 50mcg once a week. Does have some rashes after taking. Off of Enbrel causes HS to flare and pain worse. Follows with Dr. Baxter.    Herpes Zoster: current therapy include valcyclovir 500mg daily. This has been helping to suppress symptoms.    Today's Vitals: There were no vitals taken for this visit.  ----------------    I spent 50 minutes with this patient today. All changes were made via collaborative practice agreement with Ksenia Lyles. A copy of the visit note was provided to the patient's provider(s).    A summary of these recommendations was sent via "[employer].    Radha Mcdonald, Pharm.D, [facility]  Medication Therapy Management Pharmacist    Telemedicine Visit Details  Type of service:  Telephone visit  Start Time: 11:00 AM  End Time: 11:50AM     Medication Therapy Recommendations  GERD (gastroesophageal reflux disease)    Current Medication: omeprazole 20 MG tablet   Rationale: Duplicate Therapy - Unnecessary medication therapy - Indication   Recommendation: Discontinue Medication   Status: Accepted per CPA

## 2023-01-24 NOTE — PATIENT INSTRUCTIONS
After Visit Instructions:     Thank you for coming to [facility] for your care. It is my goal to partner with you to help you reach your optimal state of health.   Continue daily self-care, identifying contributing factors, and monitoring variations in pain level. Continue to integrate self-care into your life.      Follow-up with JOCELYN Zavala NP-C   Video appt in March, 30 minutes   Clinic appt in May at [place] or [place] offices, 30 minutes   Referrals: Consult Only appt with Dr Montgomery to discuss block injections or other options for gluteal cleft pain and right testicular pain  Medication Management : Continue current plan of care.       JOCELYN Padgett, NP-C  [facility] - Monday and Friday  [place] ([facility]) - Tuesday  [place] - Thursday    Be sure to request ALL medication refills 5 days prior to the due date unless you will see your medical provider in an appointment before the due date.  This is YOUR responsibility. Do not expect same day refills. If you do not plan ahead you may run out of medications.   NO early refills are allowed. It is your responsibility to manage your medications responsibility and keep them safely stored. Lost or destroyed medications WILL NOT be replaced    Scheduling/Clinic telephone Number for ALL locations:  [phone]    After Hours On-Call Service:  [phone]    Call with any questions about your care and for scheduling assistance.   Calls are returned Monday through Friday between 8 AM and 4:00 PM. We usually get back to you within 2 business days depending on the issue/request.    If we are prescribing your medications:  For opioid medication refills, call the clinic or send a KROGNI message 7 days in advance.  Please include:  Your name and date of birth.   Name of requested medication  Name of the pharmacy.  For non-opioid medications, call your pharmacy directly to request a refill. Please  allow 3-4 days to be processed.   Per [place] State Law:  All controlled substance prescriptions must be filled within 30 days of being written.    For those controlled substances allowing refills, pickup must occur within 30 days of last fill.      We believe regular attendance is key to your success in our program!    Any time you are unable to keep your appointment we ask that you call us at least 24 hours in advance to cancel.This will allow us to offer the appointment time to another patient.   Multiple missed appointments may lead to dismissal from the clinic.

## 2023-01-25 ENCOUNTER — TELEPHONE (OUTPATIENT)
Dept: ENDOCRINOLOGY | Facility: CLINIC | Age: 48
End: 2023-01-25
Payer: MEDICARE

## 2023-01-25 NOTE — TELEPHONE ENCOUNTER
[facility] Call Center    Phone Message    May a detailed message be left on voicemail: yes     Reason for Call: Other: patient called in and stated that we should have gotten a package from [employer] for the patients Ozempic.  he states that he was told this should be delivered to the clinic and he needs to pickup from there.  Please reach out. Thank you.     Action Taken: Message routed to:  Other: Endo    Travel Screening: Not Applicable

## 2023-01-25 NOTE — TELEPHONE ENCOUNTER
Spoke with patient and he was told on 1/10 that [employer] was shipping the ozempic to the clinic. I'm going to call Katie tomorrow to see the status of this.     Cuca Mora on 1/25/2023 at 4:19 PM

## 2023-01-26 ENCOUNTER — MYC MEDICAL ADVICE (OUTPATIENT)
Dept: ENDOCRINOLOGY | Facility: CLINIC | Age: 48
End: 2023-01-26

## 2023-01-26 NOTE — TELEPHONE ENCOUNTER
Called Katie [facility] and they have not yet shipped.  They said to check back in 1 to 2 days to see an updated status.  Automation also stated that shipping is also delayed in some cases up to 30 days.  Patient informed via [employer] message. Closing this encounter and will work through the [facility] encounter.     Cuca Mora on 1/26/2023 at 11:00 AM

## 2023-01-27 ENCOUNTER — RADIOLOGY INJECTION OFFICE VISIT (OUTPATIENT)
Dept: PALLIATIVE MEDICINE | Facility: CLINIC | Age: 48
End: 2023-01-27
Payer: MEDICARE

## 2023-01-27 VITALS
SYSTOLIC BLOOD PRESSURE: 114 MMHG | RESPIRATION RATE: 20 BRPM | HEART RATE: 74 BPM | OXYGEN SATURATION: 97 % | DIASTOLIC BLOOD PRESSURE: 82 MMHG

## 2023-01-27 DIAGNOSIS — G89.18 POST PROCEDURE DISCOMFORT: Primary | ICD-10-CM

## 2023-01-27 DIAGNOSIS — M47.816 LUMBAR FACET ARTHROPATHY: ICD-10-CM

## 2023-01-27 DIAGNOSIS — M47.816 SPONDYLOSIS OF LUMBAR REGION WITHOUT MYELOPATHY OR RADICULOPATHY: ICD-10-CM

## 2023-01-27 DIAGNOSIS — M47.817 LUMBOSACRAL SPONDYLOSIS WITHOUT MYELOPATHY: ICD-10-CM

## 2023-01-27 PROCEDURE — 99153 MOD SED SAME PHYS/QHP EA: CPT | Performed by: PAIN MEDICINE

## 2023-01-27 PROCEDURE — 64635 DESTROY LUMB/SAC FACET JNT: CPT | Mod: 50 | Performed by: PAIN MEDICINE

## 2023-01-27 PROCEDURE — 64636 DESTROY L/S FACET JNT ADDL: CPT | Mod: 50 | Performed by: PAIN MEDICINE

## 2023-01-27 PROCEDURE — 99152 MOD SED SAME PHYS/QHP 5/>YRS: CPT | Performed by: PAIN MEDICINE

## 2023-01-27 RX ORDER — OXYCODONE HYDROCHLORIDE 5 MG/1
5 TABLET ORAL 2 TIMES DAILY PRN
Qty: 6 TABLET | Refills: 0 | Status: SHIPPED | OUTPATIENT
Start: 2023-01-27 | End: 2023-01-30

## 2023-01-27 RX ORDER — FENTANYL CITRATE 50 UG/ML
12.5-75 INJECTION, SOLUTION INTRAMUSCULAR; INTRAVENOUS EVERY 5 MIN PRN
Status: DISCONTINUED | OUTPATIENT
Start: 2023-01-27 | End: 2023-04-06

## 2023-01-27 ASSESSMENT — PAIN SCALES - GENERAL
PAINLEVEL: MODERATE PAIN (5)
PAINLEVEL: NO PAIN (0)

## 2023-01-27 NOTE — NURSING NOTE
24 gauge Peripheral IV inserted into right hand - attempts: 1    Eloisa, RN-BSN  [facility]

## 2023-01-27 NOTE — PROGRESS NOTES
Pre procedure Diagnosis: lumbosacral spondylosis  Post procedure Diagnosis: Same  Procedure performed: lumbosacral radiofrequency ablation at L4, L5, sacral ala, fluoroscopically guided  Anesthesia: MD Time IN: 1045   Sedation start time:  1049  Sedation end time:  1135        Medications given: fentanyl 75 mcg IV; versed 2 mg IV; toradol 0 mg IV  Intravenous fluids4 were administered, normal saline 400 cc's.  Complications: none  Operators: Bubba Montgomery MD     Indications:   Joel Pineda is a 47 year old male. The patient has a history of axial lbp.  Exam shows + kemps and they have tried conservative treatment including meds/pt/prior rfa with sig benefit >1yr    Options/alternatives, benefits and risks were discussed with the patient including bleeding, infection, no pain relief, tissue trauma, exposure to radiation, reaction to medications including seizure, spinal cord injury,increased pain after the procedure, weakness, numbness or sensory changes and headache.   We also discussed risks of sedation, including reaction to medications and cardiovascular collapse.    Questions were answered to her satisfaction and she agrees to proceed. Voluntary informed consent was obtained and signed.     Vitals were reviewed: Yes  Allergies were reviewed:  Yes   Medications were reviewed:  Yes   Pre-procedure pain score: 8/10    Procedure:  After obtaining signed, informed consent, the patient was brought into the procedure suite and was placed in a prone position on the procedure table.   A Pause for the Cause was performed.  Patient was prepped and draped in sterile fashion.     PT had an IV line placed prior the procedure.  The C-arm was positioned in the left oblique view to afford optimal view of the L4-L5 vertebral bodies. Lidocaine 1% was used to anesthetize the skin at each level.  At each level, a 15cm, 20G curved radiofrequency cannula with a 10mm active tip was positioned, overlying the intersection of the  transverse process and pedicle at L4 & L5, and was advanced under intermittent fluoroscopy until it contacted the transverse process and notch, and the tip slightly overran that process, just lateral to the mamillary process.  The position of each cannula was verified and optimized in the oblique view and AP views.    In the AP view, another cannula was placed at the sacral alar notch.      Each position was tested for motor and sensory stimulation, and was positioned so that stimulation was negative for stimuli outside the immediate area of the desired lesion.  Sensory stimulation was completed at 50 Hz, with max stimulation up to 0.3V.  Motor stimulation was completed at 2Hz, up to 1.5V.   Bupivacaine 0.5 % 1ml was injected at each level.  After allowing the local anesthetic to set up, a 90 second, 80 degree Celsius lesion was generated at all three levels.    The needles were then rotated within the pathway of the medial branch, and locations were evaluated with repeat imaging.  A second lesion was then generated at each location.    The procedure was then repeated on the right side, using the same technique as described above.  Sensory stimulation was positive at 0.3 V and motor stimulation was negative at 1.5 V except for multifidus movement.    The needles were flushed with the local anesthetic as they were withdrawn.        Hemostasis was achieved, the area was cleaned, and bandaids were placed when appropriate.  The patient tolerated the procedure well, and was taken to the recovery room.    Images were saved to PACS.    Post-procedure pain score: 0/10  Follow-up includes:   -f/u phone call in one week  -post-procedure pain medications: oxycodone 5mg bid prn disp 6 tabs  -f/u with referring provider 8 weeks    Bubba Montgomery MD  [facility]      '

## 2023-01-27 NOTE — NURSING NOTE
MD Time IN: 1045   Sedation start time:  1049  Sedation end time:  1135      Medications given: fentanyl 75 mcg IV; versed 2 mg IV; toradol 0 mg IV  Intravenous fluids4 were administered, normal saline 400 cc's.  Sedation Level Achieved:  Moderate (conscious) sedation      Eloisa RN-BSN  [facility]

## 2023-01-27 NOTE — NURSING NOTE
Pre-procedure Intake  If YES to any questions or NO to having a   Please complete laminated checklist and leave on the computer keyboard for Provider, verbally inform provider if able.    For SCS Trial, RFA's or any sedation procedure:  Have you been fasting? Yes     If yes, for how long?     Are you taking any any blood thinners such as Coumadin, Warfarin, Jantoven, Pradaxa Xarelto, Eliquis, Edoxaban, Enoxaparin, Lovenox, Heparin, Arixtra, Fondaparinux, or Fragmin? OR Antiplatelet medication such as Plavix, Brilinta, or Effient?   No     If yes, when did you take your last dose?     Do you take aspirin?  Yes     If cervical procedure, have you held aspirin for 6 days?       Do you have any allergies to contrast dye, iodine, steroid and/or numbing medications?  Yes, Contrast dye     Are you currently taking antibiotics or have an active infection?  NO    Have you had a fever/elevated temperature within the past week? NO    Are you currently taking oral steroids? NO    Do you have a ? Yes    Are you pregnant or breastfeeding?  Not Applicable    Have you received the COVID-19 vaccine? Yes    If yes, was it your 1st, 2nd or only dose needed?     Date of most recent vaccine: 9/23/22    Notify provider and RNs if systolic BP >170, diastolic BP >100, P >100 or O2 sats < 90%

## 2023-01-27 NOTE — PATIENT INSTRUCTIONS
[facility]   Radiofrequency Ablation (RFA) Discharge Instructions     Today you saw:  Dr. Bubba Montgomery    You should anticipate procedural pain for up to 2 weeks.   You may receive a prescription for pain medication and you should take this as directed.   It may take up to 8 weeks to receive relief from the RFA   Follow up with your provider in JOCELYN Padgett, NP-C    If you received sedation before, during or after your procedure, for the next 24 hours you shall NOT:    -Drive    -Operate machinery    -Drink alcohol    -Sign any legal documents   You may resume your normal diet   You may resume your regular medications after the procedure   If you were holding your blood thinning medication, please restart taking it: N/A  Be cautious with walking. Numbness and/or weakness in the lower extremities may occur for up to 6-8 hours due to effect of local anesthetic  Avoid strenuous activity for the first 24 hours   You may resume your regular activities after 24 hours   You may shower, however no swimming, tub baths or hot tubs for 24 hours following your procedure   You may use ice packs 10-15 minutes three to four times a day at the injection site for comfort   Do not use heat to painful areas for 6 to 8 hours. This will give the local anesthetic time to wear off and prevent you from accidentally burning your skin.   Unless you have been directed to avoid the use of anti-inflammatory medications (NSAIDS), you may use medications such as ibuprofen, Aleve or Tylenol for pain control if needed.   If you experience any of the following, call the Pain Clinic during work hours (Monday through Friday 8 am-4:30 pm) at [phone] or the Provider Line after hours at [phone]:   -Fever over 100 degree F    -Swelling, bleeding, redness, drainage, warmth at the injection site    -Progressive weakness or numbness in your legs or arms    -Loss of bowel or bladder function    -Unusual  headache that is not relieved by Tylenol    -Unusual new onset of pain that is not improving

## 2023-01-29 DIAGNOSIS — E78.5 HYPERLIPIDEMIA LDL GOAL <70: ICD-10-CM

## 2023-01-29 DIAGNOSIS — E78.1 HYPERTRIGLYCERIDEMIA: ICD-10-CM

## 2023-01-30 DIAGNOSIS — M51.369 DDD (DEGENERATIVE DISC DISEASE), LUMBAR: ICD-10-CM

## 2023-01-30 RX ORDER — METHOCARBAMOL 500 MG/1
TABLET, FILM COATED ORAL
Qty: 240 TABLET | Refills: 1 | Status: SHIPPED | OUTPATIENT
Start: 2023-01-30 | End: 2023-04-18

## 2023-01-30 RX ORDER — OMEGA-3-ACID ETHYL ESTERS 1 G/1
CAPSULE, LIQUID FILLED ORAL
Qty: 360 CAPSULE | Refills: 0 | Status: SHIPPED | OUTPATIENT
Start: 2023-01-30 | End: 2023-05-07

## 2023-01-30 NOTE — TELEPHONE ENCOUNTER
Fax received from: [id number] - [place]  Refill authorization requested    Drug: metoclopramide (REGLAN) 5 MG tablet  Qty: 240  Last filled 12/13/22  Last seen: 01/24/23  Next appointment None

## 2023-01-31 ENCOUNTER — MYC REFILL (OUTPATIENT)
Dept: FAMILY MEDICINE | Facility: CLINIC | Age: 48
End: 2023-01-31
Payer: MEDICARE

## 2023-01-31 DIAGNOSIS — M45.8 ANKYLOSING SPONDYLITIS OF SACRAL REGION (H): ICD-10-CM

## 2023-01-31 DIAGNOSIS — M51.369 DDD (DEGENERATIVE DISC DISEASE), LUMBAR: ICD-10-CM

## 2023-01-31 RX ORDER — OXYCODONE HYDROCHLORIDE 5 MG/1
5 TABLET ORAL EVERY 6 HOURS PRN
Qty: 35 TABLET | Refills: 0 | Status: SHIPPED | OUTPATIENT
Start: 2023-01-31 | End: 2023-02-21

## 2023-02-15 ENCOUNTER — LAB (OUTPATIENT)
Dept: LAB | Facility: CLINIC | Age: 48
End: 2023-02-15
Payer: MEDICARE

## 2023-02-15 DIAGNOSIS — E78.5 HYPERLIPIDEMIA LDL GOAL <70: ICD-10-CM

## 2023-02-15 DIAGNOSIS — E55.9 VITAMIN D DEFICIENCY: ICD-10-CM

## 2023-02-15 DIAGNOSIS — E03.9 ACQUIRED HYPOTHYROIDISM: ICD-10-CM

## 2023-02-15 DIAGNOSIS — E11.9 TYPE 2 DIABETES MELLITUS WITHOUT COMPLICATION, WITHOUT LONG-TERM CURRENT USE OF INSULIN (H): ICD-10-CM

## 2023-02-15 DIAGNOSIS — E53.8 B12 DEFICIENCY: ICD-10-CM

## 2023-02-15 LAB
ANION GAP SERPL CALCULATED.3IONS-SCNC: 7 MMOL/L (ref 3–14)
BUN SERPL-MCNC: 11 MG/DL (ref 7–30)
CALCIUM SERPL-MCNC: 9.9 MG/DL (ref 8.5–10.1)
CHLORIDE BLD-SCNC: 107 MMOL/L (ref 94–109)
CHOLEST SERPL-MCNC: 133 MG/DL
CO2 SERPL-SCNC: 28 MMOL/L (ref 20–32)
CREAT SERPL-MCNC: 0.94 MG/DL (ref 0.66–1.25)
FASTING STATUS PATIENT QL REPORTED: YES
GFR SERPL CREATININE-BSD FRML MDRD: >90 ML/MIN/1.73M2
GLUCOSE BLD-MCNC: 102 MG/DL (ref 70–99)
HBA1C MFR BLD: 5.7 % (ref 0–5.6)
HDLC SERPL-MCNC: 34 MG/DL
LDLC SERPL CALC-MCNC: 44 MG/DL
NONHDLC SERPL-MCNC: 99 MG/DL
POTASSIUM BLD-SCNC: 4.1 MMOL/L (ref 3.4–5.3)
SODIUM SERPL-SCNC: 142 MMOL/L (ref 133–144)
TRIGL SERPL-MCNC: 277 MG/DL
TSH SERPL DL<=0.005 MIU/L-ACNC: 1.56 MU/L (ref 0.4–4)
VIT B12 SERPL-MCNC: 860 PG/ML (ref 232–1245)

## 2023-02-15 PROCEDURE — 80061 LIPID PANEL: CPT

## 2023-02-15 PROCEDURE — 36415 COLL VENOUS BLD VENIPUNCTURE: CPT

## 2023-02-15 PROCEDURE — 82306 VITAMIN D 25 HYDROXY: CPT

## 2023-02-15 PROCEDURE — 80048 BASIC METABOLIC PNL TOTAL CA: CPT

## 2023-02-15 PROCEDURE — 82607 VITAMIN B-12: CPT

## 2023-02-15 PROCEDURE — 83036 HEMOGLOBIN GLYCOSYLATED A1C: CPT

## 2023-02-15 PROCEDURE — 84443 ASSAY THYROID STIM HORMONE: CPT

## 2023-02-16 LAB — DEPRECATED CALCIDIOL+CALCIFEROL SERPL-MC: 36 UG/L (ref 20–75)

## 2023-02-21 ENCOUNTER — MYC REFILL (OUTPATIENT)
Dept: FAMILY MEDICINE | Facility: CLINIC | Age: 48
End: 2023-02-21
Payer: MEDICARE

## 2023-02-21 DIAGNOSIS — M45.8 ANKYLOSING SPONDYLITIS OF SACRAL REGION (H): ICD-10-CM

## 2023-02-21 DIAGNOSIS — M51.369 DDD (DEGENERATIVE DISC DISEASE), LUMBAR: ICD-10-CM

## 2023-02-21 RX ORDER — OXYCODONE HYDROCHLORIDE 5 MG/1
5 TABLET ORAL EVERY 6 HOURS PRN
Qty: 35 TABLET | Refills: 0 | Status: SHIPPED | OUTPATIENT
Start: 2023-02-21 | End: 2023-03-15

## 2023-02-27 NOTE — TELEPHONE ENCOUNTER
4 boxes of Ozempic received from [email or web address] patient assistance. Patient has been informed and states he will come to the clinic this afternoon 2/27/2023 to pick it up. The medication has been labeled and placed in the supply room in clinic refrigerator.     Ksenia Hanson, [facility]  Adult Endocrinology  [facility] [place]

## 2023-02-27 NOTE — TELEPHONE ENCOUNTER
Patient here and picked up medication. Photo ID confirmed.    Eddie Aguilar [facility]  Adult Endocrinology  [facility]

## 2023-03-02 ENCOUNTER — TRANSFERRED RECORDS (OUTPATIENT)
Dept: HEALTH INFORMATION MANAGEMENT | Facility: CLINIC | Age: 48
End: 2023-03-02
Payer: MEDICARE

## 2023-03-12 ASSESSMENT — PAIN SCALES - PAIN ENJOYMENT GENERAL ACTIVITY SCALE (PEG)
PEG_TOTALSCORE: 4
INTERFERED_GENERAL_ACTIVITY: 4
INTERFERED_ENJOYMENT_LIFE: 4
AVG_PAIN_PASTWEEK: 4

## 2023-03-13 ENCOUNTER — OFFICE VISIT (OUTPATIENT)
Dept: PALLIATIVE MEDICINE | Facility: CLINIC | Age: 48
End: 2023-03-13
Payer: MEDICARE

## 2023-03-13 VITALS — HEART RATE: 78 BPM | DIASTOLIC BLOOD PRESSURE: 85 MMHG | SYSTOLIC BLOOD PRESSURE: 133 MMHG

## 2023-03-13 DIAGNOSIS — G57.12 MERALGIA PARESTHETICA, LEFT LOWER LIMB: Primary | ICD-10-CM

## 2023-03-13 PROCEDURE — 64450 NJX AA&/STRD OTHER PN/BRANCH: CPT | Performed by: PAIN MEDICINE

## 2023-03-13 RX ORDER — BUPIVACAINE HYDROCHLORIDE 2.5 MG/ML
10 INJECTION, SOLUTION EPIDURAL; INFILTRATION; INTRACAUDAL ONCE
Status: COMPLETED | OUTPATIENT
Start: 2023-03-13 | End: 2023-03-13

## 2023-03-13 RX ADMIN — BUPIVACAINE HYDROCHLORIDE 25 MG: 2.5 INJECTION, SOLUTION EPIDURAL; INFILTRATION; INTRACAUDAL at 15:41

## 2023-03-13 ASSESSMENT — PAIN SCALES - GENERAL: PAINLEVEL: MODERATE PAIN (4)

## 2023-03-13 NOTE — PROGRESS NOTES
Diagnoses and all orders for this visit:       Bilateral Meralgia paresthetica         Diagnoses post: left meralgia paresthetica  Current Procedure: left Lateral femoral cutaneous nerve block  Current Indication (include preoperative):  Alleviation of pain      REASON FOR REFERRAL: Anterior lateral thigh pain and burning  Sonographic guidance will be used to ensure accurate placement.  PATIENT EDUCATION:  Ready to learn with no apparent learning barriers identified.  Learning preferences include listening. Explained diagnosis and treatment plan as well as treatment alternatives. Patient expressed understanding of the content.  Following denial of allergy and review of potential side effects and complications including but not necessarily limited to infection, bleeding, allergic reaction, post-injection flare, local tissue breakdown, injury to soft tissue and/or nerves and seizure, patient indicated their understanding and agreed to proceed. A written consent was obtained and is scanned into the chart. Written and signed consent obtained and is scanned into the chart.  PROCEDURE:  Prior to the procedure,a 12 MHz linear transducer was used to visualize the abdominal/groin musculature to determine the approach for the procedure.  Procedure was carried out using sterile technique including Chloraprep scrub, a sterile transducer cover, and sterile transducer gel. A simple surgical tray was used.  PROCEDURAL PAUSE:  Procedural pause conducted to verify correct patient identity, procedure to be performed, and as applicable, correct side/site, correct patient position, availability of implants, special equipment, or special requirements.  Patient position:  Supine  Transducer type:  12 MHz linear array transducer  Approach:  Medial to lateral parallel to long axis of transducer  Local Anesthesia:  25 gauge 2.5 inch needle was used to anesthetize the skin, subcutaneous tissue  with 5 ml of 1% Lidocaine  Injection: After  confirming needle tip position, syringe was replaced with one containing 1 ml of 0.5% Bupivacaine which was injected and seen hydodissecting the sartorius and tensor fascia florida on left side.  Needle was removed bandage placed over the wound.  AFTERCARE:  Patient tolerated the procedure without complication. After a short observation period, the patient was discharged under their own power and in excellent condition.  Pain noted to be a 8/10 before completion of the procedure and 0/10 after completion of the procedure.      Injection solution contained:  1ml of 0.5% bupivacaine              Bubba Montgomery MD  [facility]

## 2023-03-15 ENCOUNTER — OFFICE VISIT (OUTPATIENT)
Dept: FAMILY MEDICINE | Facility: CLINIC | Age: 48
End: 2023-03-15
Payer: MEDICARE

## 2023-03-15 VITALS
WEIGHT: 299.9 LBS | HEIGHT: 77 IN | DIASTOLIC BLOOD PRESSURE: 79 MMHG | TEMPERATURE: 99.2 F | BODY MASS INDEX: 35.41 KG/M2 | OXYGEN SATURATION: 97 % | HEART RATE: 69 BPM | SYSTOLIC BLOOD PRESSURE: 114 MMHG | RESPIRATION RATE: 16 BRPM

## 2023-03-15 DIAGNOSIS — E11.8 TYPE 2 DIABETES MELLITUS WITH COMPLICATION, WITHOUT LONG-TERM CURRENT USE OF INSULIN (H): Primary | ICD-10-CM

## 2023-03-15 DIAGNOSIS — E66.01 MORBID OBESITY (H): ICD-10-CM

## 2023-03-15 DIAGNOSIS — G90.A POTS (POSTURAL ORTHOSTATIC TACHYCARDIA SYNDROME): ICD-10-CM

## 2023-03-15 DIAGNOSIS — G90.1 DYSAUTONOMIA (H): ICD-10-CM

## 2023-03-15 DIAGNOSIS — D84.9 IMMUNOSUPPRESSION (H): ICD-10-CM

## 2023-03-15 DIAGNOSIS — F31.81 BIPOLAR 2 DISORDER (H): ICD-10-CM

## 2023-03-15 DIAGNOSIS — M51.369 DDD (DEGENERATIVE DISC DISEASE), LUMBAR: ICD-10-CM

## 2023-03-15 DIAGNOSIS — M45.8 ANKYLOSING SPONDYLITIS OF SACRAL REGION (H): ICD-10-CM

## 2023-03-15 PROCEDURE — 99214 OFFICE O/P EST MOD 30 MIN: CPT | Performed by: FAMILY MEDICINE

## 2023-03-15 RX ORDER — OXYCODONE HYDROCHLORIDE 5 MG/1
5 TABLET ORAL EVERY 6 HOURS PRN
Qty: 35 TABLET | Refills: 0 | Status: SHIPPED | OUTPATIENT
Start: 2023-03-15 | End: 2023-04-10

## 2023-03-15 ASSESSMENT — ANXIETY QUESTIONNAIRES
GAD7 TOTAL SCORE: 10
8. IF YOU CHECKED OFF ANY PROBLEMS, HOW DIFFICULT HAVE THESE MADE IT FOR YOU TO DO YOUR WORK, TAKE CARE OF THINGS AT HOME, OR GET ALONG WITH OTHER PEOPLE?: VERY DIFFICULT
6. BECOMING EASILY ANNOYED OR IRRITABLE: SEVERAL DAYS
7. FEELING AFRAID AS IF SOMETHING AWFUL MIGHT HAPPEN: NEARLY EVERY DAY
5. BEING SO RESTLESS THAT IT IS HARD TO SIT STILL: NOT AT ALL
1. FEELING NERVOUS, ANXIOUS, OR ON EDGE: MORE THAN HALF THE DAYS
GAD7 TOTAL SCORE: 10
3. WORRYING TOO MUCH ABOUT DIFFERENT THINGS: MORE THAN HALF THE DAYS
7. FEELING AFRAID AS IF SOMETHING AWFUL MIGHT HAPPEN: NEARLY EVERY DAY
GAD7 TOTAL SCORE: 10
IF YOU CHECKED OFF ANY PROBLEMS ON THIS QUESTIONNAIRE, HOW DIFFICULT HAVE THESE PROBLEMS MADE IT FOR YOU TO DO YOUR WORK, TAKE CARE OF THINGS AT HOME, OR GET ALONG WITH OTHER PEOPLE: VERY DIFFICULT
2. NOT BEING ABLE TO STOP OR CONTROL WORRYING: SEVERAL DAYS
4. TROUBLE RELAXING: SEVERAL DAYS

## 2023-03-15 ASSESSMENT — PAIN SCALES - GENERAL: PAINLEVEL: MODERATE PAIN (4)

## 2023-03-15 ASSESSMENT — PATIENT HEALTH QUESTIONNAIRE - PHQ9
SUM OF ALL RESPONSES TO PHQ QUESTIONS 1-9: 12
10. IF YOU CHECKED OFF ANY PROBLEMS, HOW DIFFICULT HAVE THESE PROBLEMS MADE IT FOR YOU TO DO YOUR WORK, TAKE CARE OF THINGS AT HOME, OR GET ALONG WITH OTHER PEOPLE: VERY DIFFICULT
SUM OF ALL RESPONSES TO PHQ QUESTIONS 1-9: 12

## 2023-03-15 NOTE — PROGRESS NOTES
"  Assessment & Plan     Type 2 diabetes mellitus with complication, without long-term current use of insulin (H)  On Ozempic for diabetes and for assistance with weight loss and numbers of done great.  He is down over 20 pounds.  Recent A1c 5.7.  We will continue to follow with a recheck in 6 months.    Morbid obesity (H)  Down over 20 pounds on Ozempic.  Making lifestyle changes as well.  Continue to follow    Dysautonomia (H)  Has been working with physical therapy on his issue.  Started on compression stockings that are knee-high would like to change to thigh-high.  He does think it is helpful.  - Compression Sleeve/Stocking Order for DME - ONLY FOR DME    Ankylosing spondylitis of sacral region (H)  Stable and following with rheumatology  - oxyCODONE (ROXICODONE) 5 MG tablet; Take 1 tablet (5 mg) by mouth every 6 hours as needed for pain (maximum 6 tablet(s) per day)    Bipolar 2 disorder (H)  Following with psychiatry.  We have kept up on his medication adjustments    Immunosuppression (H)  On immunosuppressive therapy for his ankylosing spondylitis.  Continuing to follow    POTS (postural orthostatic tachycardia syndrome)  Thigh-high compression stockings as above  - Compression Sleeve/Stocking Order for DME - ONLY FOR DME    DDD (degenerative disc disease), lumbar  Up-to-date on routine monitoring  - oxyCODONE (ROXICODONE) 5 MG tablet; Take 1 tablet (5 mg) by mouth every 6 hours as needed for pain (maximum 6 tablet(s) per day)         BMI:   Estimated body mass index is 35.77 kg/m  as calculated from the following:    Height as of this encounter: 1.95 m (6' 4.77\").    Weight as of this encounter: 136 kg (299 lb 14.4 oz).   Weight management plan: Discussed healthy diet and exercise guidelines    Depression Screening Follow Up    PHQ 3/15/2023   PHQ-9 Total Score 12   Q9: Thoughts of better off dead/self-harm past 2 weeks Several days   F/U: Thoughts of suicide or self-harm Yes   F/U: Self harm-plan No   F/U: " Self-harm action No   F/U: Safety concerns No     Last PHQ-9 3/15/2023   1.  Little interest or pleasure in doing things 1   2.  Feeling down, depressed, or hopeless 2   3.  Trouble falling or staying asleep, or sleeping too much 1   4.  Feeling tired or having little energy 3   5.  Poor appetite or overeating 1   6.  Feeling bad about yourself 2   7.  Trouble concentrating 1   8.  Moving slowly or restless 0   Q9: Thoughts of better off dead/self-harm past 2 weeks 1   PHQ-9 Total Score 12   Difficulty at work, home, or with people -   In the past two weeks have you had thoughts of suicide or self harm? Yes   Do you have concerns about your personal safety or the safety of others? No   In the past 2 weeks have you thought about a plan or had intention to harm yourself? No   In the past 2 weeks have you acted on these thoughts in any way? No       See Patient Instructions  Mental health issues well-known    Return in about 3 months (around 6/15/2023) for Follow up on Pain, In Office or Video, can call for refills.    Ksenia Lyles MD  [facility] NAWAF Francis is a 47 year old, presenting for the following health issues:  Follow Up (CSA want to sign ) and Medication Request      History of Present Illness       Back Pain:  He presents for follow up of back pain. Patient's back pain is a chronic problem.  Location of back pain:  Right lower back, left lower back, right middle of back, left middle of back, right upper back, left upper back, right side of neck, left side of neck, right buttock and left buttock  Description of back pain: dull ache, gnawing and sharp  Back pain spreads: nowhere    Since patient first noticed back pain, pain is: always present, but gets better and worse  Does back pain interfere with his job:  Not applicable      Reason for visit:  CSA, possibly discuss meds to take pre-Enbrel to reduce systemic reaction.    He eats 2-3 servings of fruits and  "vegetables daily.He consumes 1 sweetened beverage(s) daily.He exercises with enough effort to increase his heart rate 9 or less minutes per day.  He exercises with enough effort to increase his heart rate 3 or less days per week.   He is taking medications regularly.    Today's PHQ-9         PHQ-9 Total Score: 12    PHQ-9 Q9 Thoughts of better off dead/self-harm past 2 weeks :   Several days  Thoughts of suicide or self harm: (P) Yes  Self-harm Plan:   (P) No  Self-harm Action:     (P) No  Safety concerns for self or others: (P) No    How difficult have these problems made it for you to do your work, take care of things at home, or get along with other people: Very difficult  Today's YUDI-7 Score: 10         Here today to follow-up on diabetes, weight, chronic pain, other chronic issues.  Generally doing well overall.    Review of Systems   Constitutional, HEENT, cardiovascular, pulmonary, gi and gu systems are negative, except as otherwise noted.      Objective    /79   Pulse 69   Temp 99.2  F (37.3  C) (Oral)   Resp 16   Ht 1.95 m (6' 4.77\")   Wt 136 kg (299 lb 14.4 oz)   SpO2 97%   BMI 35.77 kg/m    Body mass index is 35.77 kg/m .  Physical Exam   Alert, pleasant, upbeat, and in no apparent discomfort.  S1 and S2 normal, no murmurs, clicks, gallops or rubs. Regular rate and rhythm. Chest is clear; no wheezes or rales. No edema or JVD.  Past labs reviewed with the patient.                     "

## 2023-03-17 ENCOUNTER — OFFICE VISIT (OUTPATIENT)
Dept: URGENT CARE | Facility: URGENT CARE | Age: 48
End: 2023-03-17
Payer: MEDICARE

## 2023-03-17 VITALS
WEIGHT: 299 LBS | RESPIRATION RATE: 18 BRPM | SYSTOLIC BLOOD PRESSURE: 145 MMHG | BODY MASS INDEX: 35.67 KG/M2 | TEMPERATURE: 96.9 F | DIASTOLIC BLOOD PRESSURE: 87 MMHG | OXYGEN SATURATION: 99 % | HEART RATE: 78 BPM

## 2023-03-17 DIAGNOSIS — L08.9 LOCAL INFECTION OF SKIN AND SUBCUTANEOUS TISSUE: Primary | ICD-10-CM

## 2023-03-17 PROCEDURE — 99213 OFFICE O/P EST LOW 20 MIN: CPT | Performed by: EMERGENCY MEDICINE

## 2023-03-17 RX ORDER — SULFAMETHOXAZOLE/TRIMETHOPRIM 800-160 MG
1 TABLET ORAL 2 TIMES DAILY
Qty: 6 TABLET | Refills: 0 | Status: SHIPPED | OUTPATIENT
Start: 2023-03-17 | End: 2023-03-20

## 2023-03-17 NOTE — PATIENT INSTRUCTIONS
Use topical Bacitracin and check/clean the area twice daily -- if the redness is spreading in 48 hours then start the oral antibiotics as we discussed.

## 2023-03-17 NOTE — PROGRESS NOTES
Assessment & Plan     Diagnosis:  (L08.9) Local infection of skin and subcutaneous tissue  (primary encounter diagnosis)  Plan: sulfamethoxazole-trimethoprim (BACTRIM DS)         800-160 MG tablet      Medical Decision Making:  Joel Pineda is a 47 year old male who presents for evaluation of skin redness near subcutaneous injection sites on the left lower abdomen. The history, physical exam and supporting data are consistent with local inflammation from intradermal injections; there is some evidence of cellulitis and this was discussed with the patient.  he patient is immunosuppressed and therefore we discussed wait-and-see antibiotics, he will try topical bacitracin for the next 24 to 48 hours and if worsening redness, pain, discharge or other symptoms he will start on the oral antibiotics. There do not appear at this time to be any complication of cellulitis including necrotizing fascitis, lymphangitis, lymphadenitis, abscess, osteomyelitis, sepsis, or shock.  There is no evidence of supportive outpatient management is indicated with antibiotics.  Close follow-up of primary care physician to ensure no progression and rapid resolution.   Cellulitis precautions for home.  Patient verbalizes understanding and agreement with the plan including reasons to go to the ER. All questions answered.       Chip Beck PA-C  [facility]    Subjective     Joel Pineda is a 47 year old male who presents to clinic today for the following health issues:  Chief Complaint   Patient presents with     Urgent Care     Infection     Per patient states he was seen on Monday at [facility] had procedure done inj on his left side/hip area nerve blocker and now believes the inj sites are infected        HPI  Patient states that he was seen in the pain clinic on Monday and had injections done to his left lower abdomen and hip region for his chronic pain.  He states that over the last 24 hours he has noticed  the sites become more red, warm and tender to touch.  Patient is concerned as he is got a history of immunosuppression diabetes and immunosuppressive medications, has been using bacitracin and antifungal creams with minimal improvement.  He denies any fevers, new numbness or weakness, abdominal pain, drainage from the injection sites or other symptoms.     Review of Systems    See HPI    Objective      Vitals: BP (!) 145/87   Pulse 78   Temp 96.9  F (36.1  C) (Tympanic)   Resp 18   Wt 135.6 kg (299 lb)   SpO2 99%   BMI 35.67 kg/m      Patient Vitals for the past 24 hrs:   BP Temp Temp src Pulse Resp SpO2 Weight   03/17/23 1354 (!) 145/87 96.9  F (36.1  C) Tympanic 78 18 99 % 135.6 kg (299 lb)       Vital signs reviewed by: Chip Beck PA-C    Physical Exam   Constitutional: Patient is alert and cooperative. No acute distress.  Cardiovascular: Regular rate and rhythm  Pulmonary/Chest: Lungs are clear to auscultation throughout. Effort normal. No respiratory distress. No wheezes, rales or rhonchi.  GI: Abdomen is soft and non-tender to deep palpation throughout.   Neurological: Alert and oriented x3.  Skin: Left abdominal region with 3 injection sites with nickel-sized areas of warmth, erythema and tenderness.  No fluctuance or areas of pointing.  Psychiatric:The patient has a normal mood and affect.       Chip Beck PA-C, March 17, 2023

## 2023-04-01 ENCOUNTER — NURSE TRIAGE (OUTPATIENT)
Dept: NURSING | Facility: CLINIC | Age: 48
End: 2023-04-01
Payer: MEDICARE

## 2023-04-02 NOTE — TELEPHONE ENCOUNTER
"Nurse Triage SBAR    Situation:   -palpitations, chest pain, lightheadedness    Background:   -Patient calling, It is okay to leave a detailed message at this number.   -Hx pots    Assessment:   -baseline dizziness with standing up to quick  -now has dizziness at rest in the recliner  -having episodes of palpitations, feels like skipping a beat   -the palpitations were associate with Chest pain (2/10), but now he is getting chest pain at different times as well  -no chest pain now    Recommendation:   Go to ED Now  -call 911 if needed (symptoms reviewed)    TJ LOVE RN on 4/1/2023 at 7:45 PM    Reason for Disposition    Dizziness, lightheadedness, or weakness    Chest pain    [1] Chest pain (or \"angina\") comes and goes AND [2] is happening more often (increasing in frequency) or getting worse (increasing in severity) (Exception: chest pains that last only a few seconds)    Additional Information    Negative: Shock suspected (e.g., cold/pale/clammy skin, too weak to stand, low BP, rapid pulse)    Negative: Passed out (i.e., lost consciousness, collapsed and was not responding)    Negative: Difficult to awaken or acting confused (e.g., disoriented, slurred speech)    Negative: Unable to walk, or can only walk with assistance (e.g., requires support)    Negative: Visible sweat on face or sweat dripping down face    Negative: [1] Received SHOCK from implantable cardiac defibrillator AND [2] persisting symptoms (i.e., palpitations, lightheadedness)    Negative: [1] Dizziness, lightheadedness, or weakness AND [2] heart beating very rapidly (e.g., > 140 / minute)    Negative: [1] Dizziness, lightheadedness, or weakness AND [2] heart beating very slowly (e.g., < 50 / minute)    Negative: Sounds like a life-threatening emergency to the triager    Negative: Implantable Cardiac Defibrillator (ICD) or a pacemaker symptoms or questions    Negative: Difficulty breathing    Negative: SEVERE difficulty breathing (e.g., " struggling for each breath, speaks in single words)    Negative: Difficult to awaken or acting confused (e.g., disoriented, slurred speech)    Negative: Passed out (i.e., lost consciousness, collapsed and was not responding)    Negative: [1] Chest pain lasts > 5 minutes AND [2] age > 44    Negative: [1] Chest pain lasts > 5 minutes AND [2] age > 30 AND [3] one or more cardiac risk factors (e.g., diabetes, high blood pressure, high cholesterol, smoker, or strong family history of heart disease)    Negative: [1] Chest pain lasts > 5 minutes AND [2] history of heart disease (i.e., angina, heart attack, heart failure, bypass surgery, takes nitroglycerin)    Negative: [1] Chest pain lasts > 5 minutes AND [2] described as crushing, pressure-like, or heavy    Negative: Heart beating < 50 beats per minute OR > 140 beats per minute    Negative: Shock suspected (e.g., cold/pale/clammy skin, too weak to stand, low BP, rapid pulse)    Negative: Visible sweat on face or sweat dripping down face    Negative: Sounds like a life-threatening emergency to the triager    Negative: Followed a chest injury    Negative: SEVERE chest pain    Protocols used: HEART RATE AND HEARTBEAT [id number], CHEST PAIN-A-AH

## 2023-04-06 ENCOUNTER — OFFICE VISIT (OUTPATIENT)
Dept: FAMILY MEDICINE | Facility: CLINIC | Age: 48
End: 2023-04-06
Payer: MEDICARE

## 2023-04-06 VITALS
SYSTOLIC BLOOD PRESSURE: 122 MMHG | OXYGEN SATURATION: 97 % | HEIGHT: 77 IN | BODY MASS INDEX: 35.13 KG/M2 | RESPIRATION RATE: 18 BRPM | WEIGHT: 297.5 LBS | DIASTOLIC BLOOD PRESSURE: 85 MMHG | HEART RATE: 82 BPM | TEMPERATURE: 97 F

## 2023-04-06 DIAGNOSIS — E03.9 ACQUIRED HYPOTHYROIDISM: ICD-10-CM

## 2023-04-06 DIAGNOSIS — I10 ESSENTIAL HYPERTENSION WITH GOAL BLOOD PRESSURE LESS THAN 140/90: ICD-10-CM

## 2023-04-06 DIAGNOSIS — I45.9 SKIPPED HEART BEATS: Primary | ICD-10-CM

## 2023-04-06 DIAGNOSIS — J45.20 MILD INTERMITTENT ASTHMA WITHOUT COMPLICATION: ICD-10-CM

## 2023-04-06 DIAGNOSIS — F33.2 MDD (MAJOR DEPRESSIVE DISORDER), RECURRENT SEVERE, WITHOUT PSYCHOSIS (H): ICD-10-CM

## 2023-04-06 DIAGNOSIS — G90.A POTS (POSTURAL ORTHOSTATIC TACHYCARDIA SYNDROME): ICD-10-CM

## 2023-04-06 DIAGNOSIS — K31.84 GASTROPARESIS: ICD-10-CM

## 2023-04-06 DIAGNOSIS — M45.8 ANKYLOSING SPONDYLITIS OF SACRAL REGION (H): ICD-10-CM

## 2023-04-06 DIAGNOSIS — E11.8 TYPE 2 DIABETES MELLITUS WITH COMPLICATION, WITHOUT LONG-TERM CURRENT USE OF INSULIN (H): ICD-10-CM

## 2023-04-06 DIAGNOSIS — E78.2 MIXED DYSLIPIDEMIA: ICD-10-CM

## 2023-04-06 DIAGNOSIS — G47.33 OSA (OBSTRUCTIVE SLEEP APNEA): ICD-10-CM

## 2023-04-06 PROCEDURE — 99214 OFFICE O/P EST MOD 30 MIN: CPT | Performed by: INTERNAL MEDICINE

## 2023-04-06 PROCEDURE — 93000 ELECTROCARDIOGRAM COMPLETE: CPT | Performed by: INTERNAL MEDICINE

## 2023-04-06 ASSESSMENT — ANXIETY QUESTIONNAIRES
5. BEING SO RESTLESS THAT IT IS HARD TO SIT STILL: SEVERAL DAYS
6. BECOMING EASILY ANNOYED OR IRRITABLE: MORE THAN HALF THE DAYS
2. NOT BEING ABLE TO STOP OR CONTROL WORRYING: SEVERAL DAYS
GAD7 TOTAL SCORE: 9
7. FEELING AFRAID AS IF SOMETHING AWFUL MIGHT HAPPEN: SEVERAL DAYS
3. WORRYING TOO MUCH ABOUT DIFFERENT THINGS: SEVERAL DAYS
GAD7 TOTAL SCORE: 9
7. FEELING AFRAID AS IF SOMETHING AWFUL MIGHT HAPPEN: SEVERAL DAYS
8. IF YOU CHECKED OFF ANY PROBLEMS, HOW DIFFICULT HAVE THESE MADE IT FOR YOU TO DO YOUR WORK, TAKE CARE OF THINGS AT HOME, OR GET ALONG WITH OTHER PEOPLE?: SOMEWHAT DIFFICULT
IF YOU CHECKED OFF ANY PROBLEMS ON THIS QUESTIONNAIRE, HOW DIFFICULT HAVE THESE PROBLEMS MADE IT FOR YOU TO DO YOUR WORK, TAKE CARE OF THINGS AT HOME, OR GET ALONG WITH OTHER PEOPLE: SOMEWHAT DIFFICULT
1. FEELING NERVOUS, ANXIOUS, OR ON EDGE: MORE THAN HALF THE DAYS
4. TROUBLE RELAXING: SEVERAL DAYS

## 2023-04-06 ASSESSMENT — PAIN SCALES - GENERAL: PAINLEVEL: MODERATE PAIN (4)

## 2023-04-06 NOTE — PROGRESS NOTES
Assessment & Plan     1.  Skipped heartbeats.  Patient informed that majority of the time skipped heartbeats and did not require any specific treatment.  EKG performed today in the clinic showed normal sinus rhythm.  QT interval is normal.  No premature beats noted.  To determine the type of premature heartbeat and to quantify them I will proceed with 24-hour Holter monitor.  Also recommend echocardiogram of the heart.  I will get back to him once the results are in.  2.  POTS.  Patient is wearing compression to the leg and abdominal binder with liberalization of salt intake.  Seems to be doing okay with it.  3.  Essential hypertension with blood pressure at goal.  He is on lisinopril 10 mg a day.  4.  Type 2 diabetes mellitus been treated with semaglutide.  It is under control with last hemoglobin A1c 5.7 on 2/15/2023.  5.  Mixed dyslipidemia well-controlled with cholesterol 123 H DL 34, triglyceride 277 and LDL 44 on 2/15/2023 currently on fenofibrate and rosuvastatin.  6.  Hypothyroidism well-controlled with TSH therapeutic at 1.56 on 2/15/2023  7.  Obstructive sleep apnea uses CPAP Consistently.  8.  Mild intermittent asthma stable.  9.  Ankylosing spondylitis with some chronic back pain and managed with etanercept.  10.  Gastroparesis.  11.  Measurement depression disorder, recurrent and severe been managed by psychiatry       MED REC REQUIRED  Post Medication Reconciliation Status:       Perez Ospina MD  [facility] NAWAF Francis is a 47 year old, presenting for the following health issues:  Hospital F/U        4/6/2023    10:05 AM   Additional Questions   Roomed by Glory NAVARRO          Hospital Follow-up Visit:    Hospital/Nursing Home/IP Rehab Facility: [facility]  Date of Admission: 04/01/2023  Date of Discharge: 04/01/2023  Reason(s) for Admission: Pain and Heart Problem    Was your hospitalization related to COVID-19? No   Problems taking  medications regularly:  None  Medication changes since discharge: None  Problems adhering to non-medication therapy:  None    Summary of hospitalization:  [facility] discharge summary reviewed  Diagnostic Tests/Treatments reviewed.  Follow up needed: none  Other Healthcare Providers Involved in Patient s Care:         None  Update since discharge: stable.   Plan of care communicated with patient       47-year-old gentleman with multiple health issues as mentioned in the problem list started noticing skipped heartbeats while resting in a recliner last Saturday.  He did not have true palpitation.  Felt lightheaded.  He also had some numbness in burning or stinging sensation left upper chest.  So he went into the emergency room.  EKG did not show evidence of ischemia or premature beats.  Troponin, CBC and BMP were normal.  He was discharged.  The following day on Sunday he again felt a skipped beat about 10 for a period of 2 to 3 hours.  Yesterday again he felt some premature heartbeat.  He denies shortness of breath or chest pain.  Patient came in today for follow-up.    Review of Systems   Constitutional, HEENT, cardiovascular, pulmonary, GI, , musculoskeletal, neuro, skin, endocrine and psych systems are negative, except as otherwise noted.      Objective    There were no vitals taken for this visit.  There is no height or weight on file to calculate BMI.  Physical Exam   GENERAL: healthy, alert and no distress  NECK: no adenopathy, no asymmetry, masses, or scars and thyroid normal to palpation  RESP: lungs clear to auscultation - no rales, rhonchi or wheezes  CV: regular rate and rhythm, normal S1 S2, no S3 or S4, no murmur, click or rub, no peripheral edema and peripheral pulses strong  ABDOMEN: soft, nontender, no hepatosplenomegaly, no masses and bowel sounds normal  MS: no gross musculoskeletal defects noted, no edema                    Answers for HPI/ROS submitted by the patient on  4/6/2023  YUDI 7 TOTAL SCORE: 9

## 2023-04-07 ENCOUNTER — ANCILLARY PROCEDURE (OUTPATIENT)
Dept: CARDIOLOGY | Facility: CLINIC | Age: 48
End: 2023-04-07
Attending: INTERNAL MEDICINE
Payer: MEDICARE

## 2023-04-07 DIAGNOSIS — I45.9 SKIPPED HEART BEATS: ICD-10-CM

## 2023-04-07 PROCEDURE — 93225 XTRNL ECG REC<48 HRS REC: CPT | Performed by: INTERNAL MEDICINE

## 2023-04-07 PROCEDURE — 93227 XTRNL ECG REC<48 HR R&I: CPT | Performed by: INTERNAL MEDICINE

## 2023-04-07 NOTE — PATIENT INSTRUCTIONS
The patient was educated on the purpose and function of a 24 hour Holter monitor as well as when and where to return the monitor.  After the patient demonstrated an understanding, monitor s/n [id number] was applied to the patient.  Monitor should be returned to:  [facility]  [address] [place] [address]  [phone]

## 2023-04-10 ENCOUNTER — HOSPITAL ENCOUNTER (OUTPATIENT)
Dept: CARDIOLOGY | Facility: CLINIC | Age: 48
Discharge: HOME | End: 2023-04-10
Attending: INTERNAL MEDICINE | Admitting: INTERNAL MEDICINE
Payer: MEDICARE

## 2023-04-10 ENCOUNTER — MYC REFILL (OUTPATIENT)
Dept: FAMILY MEDICINE | Facility: CLINIC | Age: 48
End: 2023-04-10

## 2023-04-10 ENCOUNTER — OFFICE VISIT (OUTPATIENT)
Dept: DERMATOLOGY | Facility: CLINIC | Age: 48
End: 2023-04-10
Payer: MEDICARE

## 2023-04-10 DIAGNOSIS — I45.9 SKIPPED HEART BEATS: ICD-10-CM

## 2023-04-10 DIAGNOSIS — M51.369 DDD (DEGENERATIVE DISC DISEASE), LUMBAR: ICD-10-CM

## 2023-04-10 DIAGNOSIS — L73.2 HIDRADENITIS SUPPURATIVA: ICD-10-CM

## 2023-04-10 DIAGNOSIS — M45.8 ANKYLOSING SPONDYLITIS OF SACRAL REGION (H): ICD-10-CM

## 2023-04-10 DIAGNOSIS — L73.9 FOLLICULITIS: Primary | ICD-10-CM

## 2023-04-10 LAB — LVEF ECHO: NORMAL

## 2023-04-10 PROCEDURE — 99213 OFFICE O/P EST LOW 20 MIN: CPT | Performed by: DERMATOLOGY

## 2023-04-10 PROCEDURE — 93306 TTE W/DOPPLER COMPLETE: CPT

## 2023-04-10 PROCEDURE — 93306 TTE W/DOPPLER COMPLETE: CPT | Mod: 26 | Performed by: INTERNAL MEDICINE

## 2023-04-10 RX ORDER — OXYCODONE HYDROCHLORIDE 5 MG/1
5 TABLET ORAL EVERY 6 HOURS PRN
Qty: 35 TABLET | Refills: 0 | Status: SHIPPED | OUTPATIENT
Start: 2023-04-10 | End: 2023-05-02

## 2023-04-10 RX ORDER — CLINDAMYCIN PHOSPHATE 10 UG/ML
LOTION TOPICAL
Qty: 60 ML | Refills: 6 | Status: SHIPPED | OUTPATIENT
Start: 2023-04-10 | End: 2023-07-21

## 2023-04-10 ASSESSMENT — PAIN SCALES - GENERAL: PAINLEVEL: MODERATE PAIN (4)

## 2023-04-10 NOTE — NURSING NOTE
Joel Pineda's chief complaint for this visit includes:  Chief Complaint   Patient presents with     Skin Check     Folliculitis chest and upper arms & HS on left gluteal cleft.      PCP: Ksenia Lyles    Referring Provider:  JOCELYN Nava CNP  [address] [facility] [address]  [place] [address]    There were no vitals taken for this visit.  Moderate Pain (4)        Allergies   Allergen Reactions     Amoxicillin-Pot Clavulanate Difficulty breathing     Banana Shortness Of Breath     Pt reports organic Banana is okay.      Nitroglycerin Palpitations     Penicillins Anaphylaxis     Provigil [Modafinil] Shortness Of Breath     headache     Flu Virus Vaccine Rash     Gadolinium Hives and Itching     Patient was premedicated for the contrast allergy. He did still have a reaction a few hours after injection. Hives and itching. Dr. Gomez told tech to inform pt he should only have contrast again in the future when premedicated and at a hospital. Not at an outpatient facility.      Ketoconazole      Topical cream caused swelling and itching     Dye [Contrast Dye] Other (See Comments) and Hives     Moderate flushing, CT contrast     Gabapentin      Other reaction(s): hives     Golimumab      Hives, bradycardia, face swelling     Naproxen      Other reaction(s): Bleeding Gums     Neurontin [Gabapentin] Hives     Moderate hives     Nortriptyline Hives     Nystatin Hives     Varicella Virus Vaccine Live      Rash     Baclofen Other (See Comments)     Cognitive changes     Flagyl [Metronidazole Hcl] Palpitations and Hives     Latex Rash     Metronidazole Palpitations, Other (See Comments) and Rash     dizziness (versus ciprofloxacin taken at same time)         Do you need any medication refills at today's visit?    No

## 2023-04-10 NOTE — LETTER
4/10/2023         RE: Joel Pineda  [address] [place] [address]        Dear Colleague,    Thank you for referring your patient, Joel Pineda, to the [facility]. Please see a copy of my visit note below.    Visit Date: 04/10/2023    SUBJECTIVE:  Recheck visit for Tito, who has a history of hidradenitis and did have surgery on the buttocks  this year.  The site has healed, but he is still havinga great deal of pain when sitting, so he has to sit on a pillow or donut device of some sort.  He would like a general check today.  Also, suggestions for pain relief.    OBJECTIVE:  We examined his face, neck, chest, and back, arms and legs, saw no classic lesions of hidradenitis in the usual sites. Does have keratosis pilaris and other follicular patches here and there.  Did not see any abnormal nevi or skin cancer like lesions.  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    ASSESSMENT:  History of hidradenitis with surgery and presently a well healed healthy scar but pain in the area despite no visible abnormalities of the surface tissue. No swelling or discolorations noted.     ASSESSMENT:  Folliculitis that is scattered and associated with keratosis pilaris.    PLAN:  Suggested clindamycin solution b.i.d. for folliculitis.  He apparently has had this in the past, but has not used it for a long time.  He already does use benzoyl peroxide, which is also beneficial and probably keeps it fairly quiet.     When it came to pain management, I advised that since he has an upcoming visit with a Pain Clinic in [facility],  that they should be very helpful in helping him with his chronic problem.  He does take Tylenol and does take oxycodone, he states, as well as Relafen and Lyrica.      Very pleasant young man. Recheck to see us in perhaps 6 months or prn.     MEDICATIONS AND ALLERGIES:  Reviewed.    LAURYN Null MD        D: 04/10/2023   T:  04/10/2023   MT: [id number]    Name:     WANG LEE  MRN:      [id number]        Account:    [id number]   :      1975           Visit Date: 04/10/2023     Document: [id number]      Again, thank you for allowing me to participate in the care of your patient.        Sincerely,        LAURYN Null MD

## 2023-04-10 NOTE — PROGRESS NOTES
Visit Date: 04/10/2023    SUBJECTIVE:  Recheck visit for Tito, who has a history of hidradenitis and did have surgery on the buttocks  this year.  The site has healed, but he is still havinga great deal of pain when sitting, so he has to sit on a pillow or donut device of some sort.  He would like a general check today.  Also, suggestions for pain relief.    OBJECTIVE:  We examined his face, neck, chest, and back, arms and legs, saw no classic lesions of hidradenitis in the usual sites. Does have keratosis pilaris and other follicular patches here and there.  Did not see any abnormal nevi or skin cancer like lesions.  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    ASSESSMENT:  History of hidradenitis with surgery and presently a well healed healthy scar but pain in the area despite no visible abnormalities of the surface tissue. No swelling or discolorations noted.     ASSESSMENT:  Folliculitis that is scattered and associated with keratosis pilaris.    PLAN:  Suggested clindamycin solution b.i.d. for folliculitis.  He apparently has had this in the past, but has not used it for a long time.  He already does use benzoyl peroxide, which is also beneficial and probably keeps it fairly quiet.     When it came to pain management, I advised that since he has an upcoming visit with a Pain Clinic in [facility],  that they should be very helpful in helping him with his chronic problem.  He does take Tylenol and does take oxycodone, he states, as well as Relafen and Lyrica.      Very pleasant young man. Recheck to see us in perhaps 6 months or prn.     MEDICATIONS AND ALLERGIES:  Reviewed.    LAURYN Null MD        D: 04/10/2023   T: 04/10/2023   MT: [id number]    Name:     WANG LEE  MRN:      [id number]        Account:    [id number]   :      1975           Visit Date: 04/10/2023     Document: [id number]

## 2023-04-13 ENCOUNTER — MYC MEDICAL ADVICE (OUTPATIENT)
Dept: FAMILY MEDICINE | Facility: CLINIC | Age: 48
End: 2023-04-13
Payer: MEDICARE

## 2023-04-13 NOTE — TELEPHONE ENCOUNTER
Routing to provider to review and advise on Holter results.    Marlene Aguilar RN  [facility]

## 2023-04-18 ENCOUNTER — VIRTUAL VISIT (OUTPATIENT)
Dept: PALLIATIVE MEDICINE | Facility: CLINIC | Age: 48
End: 2023-04-18
Payer: MEDICARE

## 2023-04-18 DIAGNOSIS — M45.8 ANKYLOSING SPONDYLITIS OF SACRAL REGION (H): ICD-10-CM

## 2023-04-18 DIAGNOSIS — Z79.899 HIGH RISK MEDICATION USE: ICD-10-CM

## 2023-04-18 DIAGNOSIS — G89.29 CHRONIC INTRACTABLE PAIN: Primary | ICD-10-CM

## 2023-04-18 DIAGNOSIS — E66.01 MORBID OBESITY (H): ICD-10-CM

## 2023-04-18 DIAGNOSIS — M79.18 MYOFASCIAL MUSCLE PAIN: ICD-10-CM

## 2023-04-18 DIAGNOSIS — E08.42 DIABETIC POLYNEUROPATHY ASSOCIATED WITH DIABETES MELLITUS DUE TO UNDERLYING CONDITION (H): ICD-10-CM

## 2023-04-18 DIAGNOSIS — M47.816 SPONDYLOSIS OF LUMBAR REGION WITHOUT MYELOPATHY OR RADICULOPATHY: ICD-10-CM

## 2023-04-18 DIAGNOSIS — M51.369 DDD (DEGENERATIVE DISC DISEASE), LUMBAR: ICD-10-CM

## 2023-04-18 PROCEDURE — 99214 OFFICE O/P EST MOD 30 MIN: CPT | Mod: VID | Performed by: NURSE PRACTITIONER

## 2023-04-18 RX ORDER — METHOCARBAMOL 500 MG/1
TABLET, FILM COATED ORAL
Qty: 240 TABLET | Refills: 1 | Status: SHIPPED | OUTPATIENT
Start: 2023-04-18 | End: 2023-07-21

## 2023-04-18 ASSESSMENT — PAIN SCALES - GENERAL: PAINLEVEL: MODERATE PAIN (4)

## 2023-04-18 NOTE — PROGRESS NOTES
[facility]      Joel Pineda is a 47 year old male who is being evaluated via a billable virtual visit.      VIDEO VISIT   How would you like to obtain your AVS? MyChart  If you are dropped from the video visit, the video invite should be resent to: Other e-mail: [employer]  Will anyone else be joining your video visit? NO,  Is patient CURRENTLY in [place]? YES  If patient encounters technical issues they should call [phone]    Genevieve Coleman MA  [facility]    Video-Visit Details  Type of service:  Video Visit  Video Start Time: 1:25 PM  Video End Time: 1:45 PM  Total Face to Face Time: 20 minutes   Originating Location (pt. Location): Home  Distant Location (provider location):  [facility]/[place]   Platform used for Video Visit: [employer]            9/15/2022     1:00 PM 1/24/2023     1:34 PM 4/18/2023     1:16 PM   PEG Score   PEG Total Score 4.33 6.67 4.67          CHIEF COMPLAINT: Chronic pain    INTERVAL HISTORY:  Last seen on 1/24/23.        Recommendations/plan at the last visit included:  1. Follow-up with JOCELYN Zavala, NP-C   1. Video appt in March, 30 minutes   2. Clinic appt in May at [place] or [place] offices, 30 minutes   2. Referrals: Consult Only appt with Dr Montgomery to discuss block injections or other options for gluteal cleft pain and right testicular pain  3. Medication Management : Continue current plan of care    Since last visit:    - 3/13/23: Left Lateral femoral cutaneous nerve block. Helpful but not as much as with Kenalog in the injection. Also having referral pain   -  Was feeling a burning sensation across upper back and shoulders. Was wondering if he had used too much lidocaine and washed it off. Was getting better but now worsening again. Has not used anything on it to treat, no ice. Has taken Benedryl, with no relief.   - Taking Ozempic and has reduced appetite.   - Recently had cardiac  Holter study but dysrhythmia was not concerning. Has POTS, echocardiogram was good.   - Sleep has been inconsistent, waking up between 1-3 AM and staying up for a few hours.     Pain Information Today: Score: Moderate Pain (4)/10. Location of pain: Neck and shoulders, low back pain, midline.     Annual Requirements last collected:  N/A     Current Pain Relevant Medications:    Acetaminophen 500 mg BID & with Oxycodone.   Cymbalta 120 mg in AM  Lyrica 150 mg BID  Methocarbamol 500 mg 1-2 QID PRN  Nabumetone 500 mg 1-2 times a day  Lidocaine gel topically 2-3 times daily     Pain Related Controlled Substances:   Clonazepam 0.5 mg at HS. Occasionally 1 tab during the day.  Lunesta 3 mg  Oxycodone 5 mg 1-2 tabs per day PRN              Total opiate dose: 7.5-15 MME     Previous Pain Relevant Medications: (H--helped; HI--Helped initially; [facility]--Somewhat helpful; NH--No help; W--worse; SE--side effects; ?--Unsure if helpful)   NOTE: This medication information taken from patient's intake form, not medical records.   Opiates: Oxycodone: H, Tramadol:[facility]. SE, Codeine: NH  NSAIDS: Celebrex: [facility], Nabumetone:H, Naproxen: SE  Anti-migraine medications: Midrin? , Maxalt:H  Muscle Relaxants: Baclofen:[facility], Flexeril:H, Tizaidine:?, Methocarbamol:?  Neuropathics: Lyrica:H  Anti-depressants: Abilify:SE, Brintellix: NH, Wellbutrin: ?, Cymbalta: NH, Nortriptyline: NH, Seroquel:   [facility], Risperdal: NH, Effexor:?, Cymbalta ?  Anxiety medications: Xanax: H, Klonpin: H, lorazepam: H      Topicals: Lidocaine:H  Sleep medications:Belsomra: NH, Melatonin:H, Trazodone NH, Ambien:NH  Other medications not covered above: Humira: All, Enbrel; H, dicyclomine:H, Medrol:[facility], Prednisone:H     Any illicit drug use: denies   EtOH use: none   Caffeine use: 6-8 per day   Nicotine use: None     Past Pain Treatments:               Pain Clinic:   Yes    [facility] pain management: For spinal injections with Dr Diaz, [facility]: injections, nerve ablation, [facility]  for SCS treatment.  Did trial but did not find it beneficial.   [facility] chronic pain program.                   PT: Yes  For other reasons. Neck, back and feet              Psychologist: Yes ongoing with private therapist.at  [facility].               Chiropractor: Yes years ago              Acupuncture: Yes [facility]              Pharmacotherapy:                           Opioids: Yes                            Non-opioids:    Yes               TENs Unit:Yes H for back   Injections: Yes Many for neck and back         Surgeries related to pain: Yes   October 2007 L5-S1 laminectomy, micro discectomy          THE 4 As OF OPIOID MAINTENANCE ANALGESIA    Analgesia: Is pain relief clinically significant? YES   Activity: Is patient functional and able to perform Activities of Daily Living? YES   Adverse effects: Is patient free from adverse side effects from opiates? YES   Adherence to Rx protocol: Is patient adhering to Controlled Substance Agreement and taking medications ONLY as ordered? YES     Is Narcan prescribed for opiate use >50 MME daily or concurrent use of opiates and benzodiazepines?  Yes prescribed by this writer 8/10/2020    [place] Board of Pharmacy Data Base Reviewed:    YES; As expected, no concern for misuse/abuse of controlled medications based on this report. Reviewed [facility] April 17, 2023- no concerning fills.    _______________________________________________      Physical Exam and Vital Signs not completed due to virtual visit.     General: Verbal, alert and no distress   Psychiatric:  affect and mood normal, mentation appears normal.     DIRE Score for ongoing opioid management is calculated as follows:    Diagnosis = 3 pts (advanced condition; severe pain/objective findings)    Intractability = 3 pts (patient fully engaged but inadequate response to treatments)    Risk        Psych = 3 pts (no significant personality dysfunction/mental illness; good communication with clinic)         Chem Hlth = 3  pts (no history of chemical dependency; not drug-focused)       Reliability = 2 pts (occasional difficulties with compliance; generally reliable)       Social = 2 pts (reduction in some relationships/life rolls)       (Psych + Chem hlth + Reliability + Social) = 16    Efficacy = 2 pts (moderate benefit/function; low med dose; too early/not tried meds)    DIRE Score = 18        7-13: likely NOT suitable candidate for long-term opioid analgesia       14-21: may be a suitable candidate for long-term opioid analgesia     Previous Diagnostic Tests:   Imaging Studies:   MR LUMBAR SPINE W/O CONTRAST 6/27/2019  Impression:   1. Multilevel lumbar spondylosis, most pronounced at L5-S1 with  moderate to severe left and moderate right neural foraminal stenosis as well as narrowing of the left lateral recess and abutment the traversing left S1 nerve root.   2. Additional multilevel degenerative changes of the lumbar spine as described above.     PAIN RELEVANT CONDITIONS:   1.  Postherpetic neuralgia  2.  Ankylosing spondylosis, Lumbar DDD with left S1 nerve involvement, lumbar stenosis  3.  Chronic migraine headaches  4.  History: Complex medical issues, see history    ASSESSMENT AND PLAN    (G89.29) Chronic intractable pain  (primary encounter diagnosis)  (M51.36) DDD (degenerative disc disease), lumbar  (M79.18) Myofascial muscle pain  (M45.8) Ankylosing spondylitis of sacral region (H)  (E08.42) Diabetic polyneuropathy associated with diabetes mellitus due to underlying condition (H)  (M47.816) Spondylosis of lumbar region without myelopathy or radiculopathy  (E66.01) Morbid obesity (H)  Comment: Tito has good relief from TPIs and requests a new order for them, as well as a refill on Methocarbamol.    Discussed GLP1 inhibitors for weight loss. Tito has been working on weight loss, physical exercise is difficult due to multiple chronic pain conditions.   Plan: methocarbamol (ROBAXIN) 500 MG tablet  PAIN INJECTION  EVAL/TREAT/FOLLOW UP    (Z79.899) High risk medication use  Comment: Concurrent use of opiates, benzodiazepines and sedatives. Updating  prescription.   Plan: naloxone (NARCAN) 4 MG/0.1ML nasal spray      PATIENT INSTRUCTIONS:     Diagnosis reviewed, treatment option addressed, and risk/benefits discussed.  Self-care instructions given.  I am recommending a multidisciplinary treatment plan to help this patient better manage pain.    Remember to request ALL medication refills 5-7 BUSINESS days before you run out.     1. 30 minute Clinic follow-up with JOCELYN Zavala, NP-C in July .  Ok to schedule up to three follow up appts at a time, alternating clinic and virtual .   2. Procedures: Trigger points ordered, we will call you to schedule this appt.   3. Medication Management :   1. Narcan refilled   2. Methocarbamol refilled, call your pharmacy when you need this one.     I have reviewed the note as documented above.  This accurately captures the substance of my conversation with the patient.    BILLING TIME DOCUMENTATION:   TOTAL TIME on the date of service includes:   Time spent preparing to see the patient: 2 minutes (reviewing records and tests)  Time spend face to face with the patient: 20 minutes  Time spent ordering tests, medications, procedures and referrals: 0 minutes  Time spent Referring and communicating with other healthcare professionals: 0 minutes  Documenting clinical information in Epic: 4 minutes    The total TIME spent on this patient on the day of the appointment was 26 minutes.     JOCELYN Padgett, NP-C  [facility]    (Information in italics and blue color are taken from previous pain and consulting medical providers notes and are documented as such)

## 2023-04-18 NOTE — NURSING NOTE
9/15/2022     1:00 PM 1/24/2023     1:34 PM 4/18/2023     1:16 PM   PEG Score   PEG Total Score 4.33 6.67 4.67           Genevieve Coleman MA  [facility] [place]

## 2023-04-23 DIAGNOSIS — L30.9 DERMATITIS: ICD-10-CM

## 2023-04-24 RX ORDER — TRIAMCINOLONE ACETONIDE 1 MG/G
CREAM TOPICAL
Qty: 30 G | Refills: 0 | Status: SHIPPED | OUTPATIENT
Start: 2023-04-24 | End: 2023-04-25

## 2023-04-24 NOTE — PROGRESS NOTES
"Medication Therapy Management (MTM) Encounter    ASSESSMENT:                            Medication Adherence/Access: No issues reported    Mood/Anxiety/Insomnia:  Stable.  Follows closely with psychiatry.      Diabetes: Stable. meeting A1c goal of less than 7%.    Asthma: May benefit from an alternate ICS/LABA combination to see if it is the Advair causing a hoarse voice.    Allergic Rhinitis: Stable.    Ankylosing Spondylitis: Stable.    PLAN:                            1. Could consider an alternate ICS/LABA combination. Patient will reach out to pulmonology.    Follow-up: 3 months    SUBJECTIVE/OBJECTIVE:                          Joel Pineda is a 47 year old male called for a follow up visit. He was referred to me from Ksenia Lyles. Follow up from 1/24/2023.     Reason for visit: Medication Review.    Allergies/ADRs: Reviewed in chart  Past Medical History: Reviewed in chart  Tobacco: He reports that he has never smoked. He has never used smokeless tobacco.  Alcohol: none  Caffeine: 4-5 cups of coffee/day  Activity: None    Medication Adherence/Access: no issues reported  Patient uses pill box(es).  Patient qualifies for [employer] program for Enbrel (delivers to his house) and Ozempic (ships to [facility] endocrinology clinic)    Mood/Anxiety/Insomnia: current therapy includes bupropion XL 300mg daily (dose was increased about a month ago, does feel this has given him a little more motivation), duloxetine 120mg once daily, risperidone 0.5mg twice daily as needed (takes 3 or 4 times a week when he wakes up in the middle of the night which helps him get back sleep), clonazepam 1mg at night for RBD and an additional 0.5mg in the middle of the night 4 times as week, Lunesta 3mg at bedtime, melatonin 13mg at bedtime for RBD. Sleep has been pretty good. Mood has been \"par for the course\".         8/25/2022     3:37 PM 12/7/2022     9:34 AM 3/15/2023    11:59 AM   PHQ   PHQ-9 Total Score 6 13 12   Q9: Thoughts of " better off dead/self-harm past 2 weeks Several days Several days Several days   F/U: Thoughts of suicide or self-harm Yes Yes Yes   F/U: Self harm-plan No No No   F/U: Self-harm action No No No   F/U: Safety concerns No No No      Patient's psychiatrist is Dr. Rodriguez at [facility] in [place].  Prefers PhD therapists. Finds they are more beneficial to him.     Diabetes:  Current therapy includes Ozempic 1mg weekly.  Ozempic is helping with weight loss; is below 300 pounds.   SMBG: rarely.    Recent symptoms of low blood sugar? Does set his alarm to remind him to eat.   Recent symptoms of high blood sugar? none  Eye exam: has appt scheduled this week  Foot exam: due  ACEi/ARB: Yes: Ramipril.   Urine Albumin:   Lab Results   Component Value Date    MICROL 9 09/23/2022    MICROL 8 10/14/2020       Lab Results   Component Value Date    A1C 5.7 02/15/2023    A1C 6.4 09/23/2022    A1C 6.3 02/10/2022    A1C 6.0 08/19/2021    A1C 6.5 02/10/2021    A1C 6.0 10/16/2020    A1C 6.1 08/18/2020    A1C 6.0 01/24/2020    A1C 5.9 09/13/2019     Asthma: Current medications: ICS/LABA- Advair HFA 45-21mcg 2 puff(s) twice daily (continues to have a hoarse voice with MDI. It did get better after switching off of the discus but still has the hoarse voice) Montelukast (Singulair) once daily  Short-Acting Bronchodilator: Albuterol MDI-uses prior to going out in cold air and prior to taking Enbrel. Patient rinses their mouth after using steroid inhaler. Triggers include: cold air and pollutants.  Patient reports the following symptoms: increased need of albuterol.  Asthma Action Plan on file: NO      4/19/2022     7:47 AM 6/8/2022     2:00 PM 12/7/2022     9:36 AM   ACT Total Scores   ACT TOTAL SCORE (Goal Greater than or Equal to 20) 21 21 22   In the past 12 months, how many times did you visit the emergency room for your asthma without being admitted to the hospital? 0 0 0   In the past 12 months, how many times were you  hospitalized overnight because of your asthma? 0 0 0     Allergic Rhinitis: Current medications include cetirizine 10mg once daily, ophthalmic drops - Pataday 1 drop into both eyes once daily, Mucinex-D at night a couple times a month. Primary symptoms are sneezing and itchy/watery eyes. Patient feels that current therapy is effective.     Ankylosing Spondylitis: current medications include Enbrel 50mcg once a week. Will be establishing care with Dr. Ibrahim.     Today's Vitals: There were no vitals taken for this visit.  ----------------    I spent 20 minutes with this patient today. All changes were made via collaborative practice agreement with Ksenia Lyles. A copy of the visit note was provided to the patient's provider(s).    A summary of these recommendations was sent via [employer].    Radha Mcdonald, Pharm.D, [facility]  Medication Therapy Management Pharmacist    Telemedicine Visit Details  Type of service:  Telephone visit  Start Time: 11:08AM  End Time: 11:28AM     Medication Therapy Recommendations  Intermittent asthma    Current Medication: fluticasone-salmeterol (ADVAIR-HFA) 45-21 MCG/ACT inhaler   Rationale: Undesirable effect - Adverse medication event - Safety   Recommendation: Change Medication - Dulera 100-5 MCG/ACT Aero   Status: Contact Provider - Awaiting Response

## 2023-04-25 ENCOUNTER — MYC MEDICAL ADVICE (OUTPATIENT)
Dept: PULMONOLOGY | Facility: CLINIC | Age: 48
End: 2023-04-25
Payer: MEDICARE

## 2023-04-25 ENCOUNTER — TELEPHONE (OUTPATIENT)
Dept: FAMILY MEDICINE | Facility: CLINIC | Age: 48
End: 2023-04-25
Payer: MEDICARE

## 2023-04-25 ENCOUNTER — VIRTUAL VISIT (OUTPATIENT)
Dept: PHARMACY | Facility: CLINIC | Age: 48
End: 2023-04-25
Payer: COMMERCIAL

## 2023-04-25 DIAGNOSIS — E11.9 TYPE 2 DIABETES MELLITUS WITHOUT COMPLICATION, WITHOUT LONG-TERM CURRENT USE OF INSULIN (H): Primary | ICD-10-CM

## 2023-04-25 DIAGNOSIS — F41.8 DEPRESSION WITH ANXIETY: ICD-10-CM

## 2023-04-25 DIAGNOSIS — J30.2 SEASONAL ALLERGIC RHINITIS, UNSPECIFIED TRIGGER: ICD-10-CM

## 2023-04-25 DIAGNOSIS — M45.8 ANKYLOSING SPONDYLITIS OF SACRAL REGION (H): ICD-10-CM

## 2023-04-25 DIAGNOSIS — J45.30 MILD PERSISTENT ASTHMA WITHOUT COMPLICATION: ICD-10-CM

## 2023-04-25 DIAGNOSIS — G47.00 INSOMNIA, UNSPECIFIED TYPE: ICD-10-CM

## 2023-04-25 DIAGNOSIS — L30.9 DERMATITIS: ICD-10-CM

## 2023-04-25 DIAGNOSIS — J45.30 MILD PERSISTENT ASTHMA, UNSPECIFIED WHETHER COMPLICATED: Primary | ICD-10-CM

## 2023-04-25 DIAGNOSIS — G43.109 MIGRAINE WITH AURA AND WITHOUT STATUS MIGRAINOSUS, NOT INTRACTABLE: ICD-10-CM

## 2023-04-25 PROCEDURE — 99207 PR NO CHARGE LOS: CPT | Performed by: PHARMACIST

## 2023-04-25 RX ORDER — BUPROPION HYDROCHLORIDE 300 MG/1
1 TABLET ORAL DAILY
COMMUNITY
Start: 2023-04-03

## 2023-04-25 RX ORDER — TRIAMCINOLONE ACETONIDE 1 MG/G
CREAM TOPICAL
Qty: 30 G | Refills: 0 | Status: SHIPPED | OUTPATIENT
Start: 2023-04-25 | End: 2023-07-21

## 2023-04-25 NOTE — TELEPHONE ENCOUNTER
Forms/Letter Request    Type of form/letter: [facility]    Have you been seen for this request: N/A    Do we have the form/letter: Yes: Will place forms on providers desk for review/signature    When is form/letter needed by: asap    How would you like the form/letter returned: Fax : [phone]       Detail Level: Simple Detail Level: Detailed

## 2023-04-25 NOTE — PATIENT INSTRUCTIONS
"Recommendations from today's MTM visit:                                                         1. Could consider an alternate ICS/LABA combination. Reach out to pulmonology.    Follow-up: 3 months    It was great speaking with you today.  I value your experience and would be very thankful for your time in providing feedback in our clinic survey. In the next few days, you may receive an email or text message from [email or web address] with a link to a survey related to your  clinical pharmacist.\"     To schedule another MTM appointment, please call the clinic directly or you may call the MTM scheduling line at [phone] or toll-free at [phone].     My Clinical Pharmacist's contact information:                                                      Please feel free to contact me with any questions or concerns you have.      Radha Mcdonald, Pharm.D, BCACP  Medication Therapy Management Pharmacist      "

## 2023-04-25 NOTE — TELEPHONE ENCOUNTER
This should be entered into [facility] as a prescription refill so we can process accordingly.  We do not respond to refills via fax -no way to track it.

## 2023-04-26 RX ORDER — RIZATRIPTAN BENZOATE 10 MG/1
10 TABLET ORAL
Qty: 30 TABLET | Refills: 1 | Status: SHIPPED | OUTPATIENT
Start: 2023-04-26 | End: 2024-02-18

## 2023-04-26 RX ORDER — BUDESONIDE AND FORMOTEROL FUMARATE DIHYDRATE 80; 4.5 UG/1; UG/1
2 AEROSOL RESPIRATORY (INHALATION) 2 TIMES DAILY
Qty: 30.6 G | Refills: 3 | Status: SHIPPED | OUTPATIENT
Start: 2023-04-26 | End: 2023-06-05

## 2023-04-26 NOTE — TELEPHONE ENCOUNTER
Rx for Symbicort 80/4.5 has been sent to the [facility] in [place] per Dr. Gunter's verbal order. Symbicort to replace fluticasone-salmeterol (ADVAIR-HFA) 45-21 MCG/ACT inhaler, as patient c/o voice hoarseness with the Advair.      Slime Szymanski LPN  Pulmonary Medicine:  [facility]  Phone: [phone] Fax: [phone]

## 2023-04-27 ASSESSMENT — PAIN SCALES - PAIN ENJOYMENT GENERAL ACTIVITY SCALE (PEG)
AVG_PAIN_PASTWEEK: 4
INTERFERED_ENJOYMENT_LIFE: 4
PEG_TOTALSCORE: 4
INTERFERED_GENERAL_ACTIVITY: 4

## 2023-04-28 ENCOUNTER — OFFICE VISIT (OUTPATIENT)
Dept: OPHTHALMOLOGY | Facility: CLINIC | Age: 48
End: 2023-04-28
Payer: MEDICARE

## 2023-04-28 DIAGNOSIS — M45.8 ANKYLOSING SPONDYLITIS OF SACRAL REGION (H): ICD-10-CM

## 2023-04-28 DIAGNOSIS — H10.13 ALLERGIC CONJUNCTIVITIS OF BOTH EYES: ICD-10-CM

## 2023-04-28 DIAGNOSIS — H52.203 MYOPIA OF BOTH EYES WITH ASTIGMATISM AND PRESBYOPIA: ICD-10-CM

## 2023-04-28 DIAGNOSIS — H04.129 DRY EYE: ICD-10-CM

## 2023-04-28 DIAGNOSIS — E11.9 TYPE 2 DIABETES MELLITUS WITHOUT RETINOPATHY (H): Primary | ICD-10-CM

## 2023-04-28 DIAGNOSIS — H52.13 MYOPIA OF BOTH EYES WITH ASTIGMATISM AND PRESBYOPIA: ICD-10-CM

## 2023-04-28 DIAGNOSIS — H52.4 MYOPIA OF BOTH EYES WITH ASTIGMATISM AND PRESBYOPIA: ICD-10-CM

## 2023-04-28 PROCEDURE — 92015 DETERMINE REFRACTIVE STATE: CPT | Performed by: OPHTHALMOLOGY

## 2023-04-28 PROCEDURE — 92014 COMPRE OPH EXAM EST PT 1/>: CPT | Performed by: OPHTHALMOLOGY

## 2023-04-28 ASSESSMENT — CONF VISUAL FIELD
OS_INFERIOR_NASAL_RESTRICTION: 0
OD_INFERIOR_NASAL_RESTRICTION: 0
OS_SUPERIOR_TEMPORAL_RESTRICTION: 0
OS_SUPERIOR_NASAL_RESTRICTION: 0
OD_SUPERIOR_TEMPORAL_RESTRICTION: 0
OS_NORMAL: 1
OS_INFERIOR_TEMPORAL_RESTRICTION: 0
METHOD: COUNTING FINGERS
OD_SUPERIOR_NASAL_RESTRICTION: 0
OD_INFERIOR_TEMPORAL_RESTRICTION: 0
OD_NORMAL: 1

## 2023-04-28 ASSESSMENT — REFRACTION_WEARINGRX
OD_ADD: +1.25
OS_CYLINDER: +1.75
OD_AXIS: 030
OS_ADD: +1.25
OD_CYLINDER: +1.00
OS_AXIS: 098
SPECS_TYPE: PAL
OS_SPHERE: -4.75
OD_SPHERE: -3.75

## 2023-04-28 ASSESSMENT — VISUAL ACUITY
OD_CC: J3
OD_CC+: -1
OD_CC: 20/20
OS_CC: 20/20
OS_CC: J10
METHOD: SNELLEN - LINEAR
CORRECTION_TYPE: GLASSES

## 2023-04-28 ASSESSMENT — TONOMETRY
OS_IOP_MMHG: 07
OD_IOP_MMHG: 09
IOP_METHOD: ICARE

## 2023-04-28 ASSESSMENT — REFRACTION_MANIFEST
OD_CYLINDER: +1.25
OD_AXIS: 040
OS_CYLINDER: +1.50
OD_SPHERE: -3.75
OS_SPHERE: -4.50
OS_ADD: +1.75
OD_ADD: +1.75
OS_AXIS: 105

## 2023-04-28 ASSESSMENT — CUP TO DISC RATIO
OD_RATIO: 0.25
OS_RATIO: 0.2

## 2023-04-28 ASSESSMENT — EXTERNAL EXAM - LEFT EYE: OS_EXAM: NORMAL

## 2023-04-28 ASSESSMENT — EXTERNAL EXAM - RIGHT EYE: OD_EXAM: NORMAL

## 2023-04-28 ASSESSMENT — SLIT LAMP EXAM - LIDS
COMMENTS: NORMAL
COMMENTS: NORMAL

## 2023-04-28 NOTE — NURSING NOTE
Chief Complaints and History of Present Illnesses   Patient presents with     Diabetic Eye Exam       Chief Complaint(s) and History of Present Illness(es)     Diabetic Eye Exam            Associated symptoms: blurred vision    Diabetes Type: Type 2 and on insulin    Blood Sugars: is controlled          Comments    Intermittent blurriness at end of the day believes due to fatigue- use of artificial tears and pataday help.  Overall vision is unchanged.   Lab Results   Component Value Date    A1C 5.7 02/15/2023    A1C 6.4 09/23/2022    A1C 6.3 02/10/2022    A1C 6.0 08/19/2021    A1C 6.5 02/10/2021    A1C 6.0 10/16/2020    A1C 6.1 08/18/2020    A1C 6.0 01/24/2020    A1C 5.9 09/13/2019                    FELICIANO Tucker  1:12 PM 04/28/2023

## 2023-04-30 NOTE — PROGRESS NOTES
HPI     Diabetic Eye Exam    Associated symptoms include blurred vision.  Diabetes characteristics include Type 2 and on insulin.  Blood sugar level is controlled.           Comments    Intermittent blurriness at end of the day believes due to fatigue- use of artificial tears and pataday help.  Overall vision is unchanged.           Last edited by Herminia Pritchett on 4/28/2023  1:12 PM.         Review of systems for the eyes was negative other than the pertinent positives/negatives listed in the HPI.      Assessment & Plan    HPI:  Joel Pineda is a 47 year old male with history of T2DM, ankylosing spondylitis, hypothyroidism, HLD, POTS, myopia with astigmatism and presbyopia, allergic conjunctivitis, presents for annual diabetic eye exam. Notes blurry vision end of day.       POHx:  myopia with astigmatism and presbyopia, allergic conjunctivitis,   PMHx: T2DM, ankylosing spondylitis, hypothyroidism, HLD, POTSCurrent Medications: acetaminophen 500 MG CAPS, Take 1,000 mg by mouth 3 times daily  albuterol (PROAIR HFA/PROVENTIL HFA/VENTOLIN HFA) 108 (90 Base) MCG/ACT inhaler, Inhale 2 puffs into the lungs every 4 hours as needed for shortness of breath / dyspnea or wheezing  aspirin (ASA) 81 MG tablet, Take 81 mg by mouth daily   budesonide-formoterol (SYMBICORT) 80-4.5 MCG/ACT Inhaler, Inhale 2 puffs into the lungs 2 times daily  buPROPion (WELLBUTRIN XL) 300 MG 24 hr tablet, Take 1 tablet by mouth daily  cholecalciferol (D3-50) 1250 mcg (46280 units) capsule, TAKE ONE CAPSULE BY MOUTH EVERY 2 WEEKS.  clindamycin (CLEOCIN T) 1 % external lotion, Apply bid for folliculitis  clonazePAM (KLONOPIN) 0.5 MG tablet, Take 1 mg by mouth At Bedtime For RBD  cyanocobalamin (CYANOCOBALAMIN) 1000 MCG/ML injection, INJECT 1 ML INTO THE MUSCLE EVERY 30 DAYS  DULoxetine (CYMBALTA) 60 MG capsule, Take 120 mg by mouth daily   EPINEPHrine (ANY BX GENERIC EQUIV) 0.3 MG/0.3ML injection 2-pack, Inject 0.3 mLs (0.3 mg) into the muscle once as  needed for anaphylaxis  eszopiclone (LUNESTA) 3 MG tablet, Take 3 mg by mouth At Bedtime   etanercept (ENBREL SURECLICK) 50 MG/ML autoinjector, Inject 50 mg Subcutaneous once a week . Hold for signs of infection, and seek medical attention.  famotidine (PEPCID) 20 MG tablet, Prior to administration of Humira every 2 weeks (Patient taking differently: Take 20 mg by mouth every 7 days Prior to Enbrel Injections to prevent skin reaction)  fenofibrate (TRICOR) 48 MG tablet, Take 1 tablet (48 mg) by mouth daily  fluticasone-salmeterol (ADVAIR-HFA) 45-21 MCG/ACT inhaler, Inhale 2 puffs into the lungs 2 times daily  levothyroxine (SYNTHROID/LEVOTHROID) 75 MCG tablet, TAKE ONE TABLET BY MOUTH EVERY MORNING  lidocaine (XYLOCAINE) 5 % external ointment, APPLY TOPICALLY 4 TIMES DAILY AS NEEDED FOR PAIN  melatonin 3 MG tablet, Take 13 mg by mouth nightly as needed   methocarbamol (ROBAXIN) 500 MG tablet, TAKE 1 - 2 TABLETS BY MOUTH 4 TIMES DAILY AS NEEDED FOR MUSCLE SPASMS  metoclopramide (REGLAN) 5 MG tablet, Take 5 mg by mouth 4 times daily as needed  metoprolol succinate ER (TOPROL XL) 200 MG 24 hr tablet, Take 1 tablet (200 mg) by mouth 2 times daily  montelukast (SINGULAIR) 10 MG tablet, TAKE ONE TABLET BY MOUTH EVERY EVENING  nabumetone (RELAFEN) 500 MG tablet, TAKE 1-2 TABLETS BY MOUTH TWICE DAILY AS NEEDED WITH FOOD FOR MODERATE PAIN  naloxone (NARCAN) 4 MG/0.1ML nasal spray, Spray 1 spray (4 mg) into one nostril alternating nostrils once as needed for opioid reversal every 2-3 minutes until assistance arrives  olopatadine (PATADAY) 0.2 % ophthalmic solution, Place 1 drop into both eyes daily  omega-3 acid ethyl esters (LOVAZA) 1 g capsule, TAKE TWO CAPSULES BY MOUTH TWICE DAILY  order for DME, Equipment being ordered: lumbosacral belt/brace  order for DME, Respironics REMSTAR 60 Series Auto CPAP 9-13 cm H2O, Wisp nasal mask w/a large cushion and a chinstrap  oxyCODONE (ROXICODONE) 5 MG tablet, Take 1 tablet (5 mg) by  "mouth every 6 hours as needed for pain (maximum 6 tablet(s) per day)  pregabalin (LYRICA) 150 MG capsule, TAKE ONE CAPSULE BY MOUTH THREE TIMES DAILY  pseudoePHEDrine-guaiFENesin (MUCINEX D)  MG 12 hr tablet, Take 1 tablet by mouth every 12 hours  pyridostigmine (MESTINON) 60 MG tablet, Take 1 tablet by mouth 3 times daily  ramipril (ALTACE) 10 MG capsule, TAKE ONE CAPSULE BY MOUTH ONCE DAILY  risperiDONE (RISPERDAL) 0.5 MG tablet, Take 0.5 mg by mouth 2 times daily as needed  rizatriptan (MAXALT) 10 MG tablet, Take 1 tablet (10 mg) by mouth at onset of headache for migraine May repeat in 2 hours. Max 3 tablets/24 hours.  rosuvastatin (CRESTOR) 40 MG tablet, Take 1 tablet (40 mg) by mouth daily  Semaglutide, 1 MG/DOSE, 4 MG/3ML SOPN, Inject 1 mg Subcutaneous once a week  sodium fluoride 1.1 % CREA, At Bedtime  spacer ([facility]) holding chamber, To be used in conjunction with albuterol (PROAIR HFA/PROVENTIL HFA/VENTOLIN HFA) 108 (90 Base) MCG/ACT inhaler  syringe/needle, disp, (BD INTEGRA SYRINGE) 25G X 1\" 3 ML MISC, USE WITH B12 INJECTIONS  triamcinolone (KENALOG) 0.1 % external cream, APPLY TOPICALLY TWICE DAILY AS NEEDED FOR IRRITATION  valACYclovir (VALTREX) 500 MG tablet, TAKE ONE TABLET BY MOUTH ONCE DAILY  vitamin B complex with vitamin C (STRESS TAB) tablet, Take 1 tablet by mouth daily  ZINC SULFATE-VITAMIN C MT, Take 1 tablet by mouth daily  [DISCONTINUED] metFORMIN (GLUCOPHAGE-XR) 500 MG 24 hr tablet, Take 2 tablets (1,000 mg) by mouth daily (with dinner)    No current facility-administered medications on file prior to visit.    FHx: denies family history of ocular conditions   PSHx: denies history of ocular surgeries       Current Eye Medications:      Assessment & Plan:  (E11.9) Type 2 diabetes mellitus without retinopathy (H)  (primary encounter diagnosis)  Diagnosed 2019  Most recent HgBA1c 5.7 on 2/15/23  No background diabetic retinopathy or neovascularization noted on today's exam.  " Discussed ocular and systemic benefits of blood pressure and blood sugar control.  Return in 1 year for full exam with dilation or sooner if changes to vision.       (H52.13,  H52.203,  H52.4) Myopia of both eyes with astigmatism and presbyopia  Patient has minimal change in myopia but a copy of today's glasses prescription was given.  The patient may wish to update the glasses if the lenses are scratched or the frames are too small.  Presbyopia is difficulty seeing up close and is treated with bifocals or over the counter reading glasses    (M45.8) Ankylosing spondylitis of sacral region (H)  No evidence of uveitis on exam today  Currently on ebrel    (H04.129) Dry eye  (H10.13) Allergic conjunctivitis of both eyes  Continue AT and pataday PRN    Return in about 1 year (around 4/28/2024) for Annual Visit.        Rian Diez MD     Attending Physician Attestation:  Complete documentation of historical and exam elements from today's encounter can be found in the full encounter summary report (not reduplicated in this progress note).  I personally obtained the chief complaint(s) and history of present illness.  I confirmed and edited as necessary the review of systems, past medical/surgical history, family history, social history, and examination findings as documented by others; and I examined the patient myself.  I personally reviewed the relevant tests, images, and reports as documented above.  I formulated and edited as necessary the assessment and plan and discussed the findings and management plan with the patient and family. - Rian Diez MD

## 2023-05-01 ENCOUNTER — OFFICE VISIT (OUTPATIENT)
Dept: PALLIATIVE MEDICINE | Facility: CLINIC | Age: 48
End: 2023-05-01
Attending: NURSE PRACTITIONER
Payer: MEDICARE

## 2023-05-01 VITALS — HEART RATE: 83 BPM | SYSTOLIC BLOOD PRESSURE: 111 MMHG | DIASTOLIC BLOOD PRESSURE: 74 MMHG

## 2023-05-01 DIAGNOSIS — M79.18 MYOFASCIAL MUSCLE PAIN: ICD-10-CM

## 2023-05-01 PROCEDURE — 20553 NJX 1/MLT TRIGGER POINTS 3/>: CPT | Performed by: PAIN MEDICINE

## 2023-05-01 RX ADMIN — BUPIVACAINE HYDROCHLORIDE 25 MG: 2.5 INJECTION, SOLUTION EPIDURAL; INFILTRATION; INTRACAUDAL at 10:28

## 2023-05-01 ASSESSMENT — PAIN SCALES - GENERAL: PAINLEVEL: MODERATE PAIN (4)

## 2023-05-02 ENCOUNTER — MYC REFILL (OUTPATIENT)
Dept: FAMILY MEDICINE | Facility: CLINIC | Age: 48
End: 2023-05-02
Payer: MEDICARE

## 2023-05-02 DIAGNOSIS — M45.8 ANKYLOSING SPONDYLITIS OF SACRAL REGION (H): ICD-10-CM

## 2023-05-02 DIAGNOSIS — M51.369 DDD (DEGENERATIVE DISC DISEASE), LUMBAR: ICD-10-CM

## 2023-05-02 RX ORDER — OXYCODONE HYDROCHLORIDE 5 MG/1
5 TABLET ORAL EVERY 6 HOURS PRN
Qty: 35 TABLET | Refills: 0 | Status: SHIPPED | OUTPATIENT
Start: 2023-05-02 | End: 2023-05-19

## 2023-05-02 ASSESSMENT — ANXIETY QUESTIONNAIRES
GAD7 TOTAL SCORE: 8
5. BEING SO RESTLESS THAT IT IS HARD TO SIT STILL: SEVERAL DAYS
6. BECOMING EASILY ANNOYED OR IRRITABLE: MORE THAN HALF THE DAYS
8. IF YOU CHECKED OFF ANY PROBLEMS, HOW DIFFICULT HAVE THESE MADE IT FOR YOU TO DO YOUR WORK, TAKE CARE OF THINGS AT HOME, OR GET ALONG WITH OTHER PEOPLE?: VERY DIFFICULT
7. FEELING AFRAID AS IF SOMETHING AWFUL MIGHT HAPPEN: SEVERAL DAYS
4. TROUBLE RELAXING: SEVERAL DAYS
IF YOU CHECKED OFF ANY PROBLEMS ON THIS QUESTIONNAIRE, HOW DIFFICULT HAVE THESE PROBLEMS MADE IT FOR YOU TO DO YOUR WORK, TAKE CARE OF THINGS AT HOME, OR GET ALONG WITH OTHER PEOPLE: VERY DIFFICULT
7. FEELING AFRAID AS IF SOMETHING AWFUL MIGHT HAPPEN: SEVERAL DAYS
1. FEELING NERVOUS, ANXIOUS, OR ON EDGE: SEVERAL DAYS
2. NOT BEING ABLE TO STOP OR CONTROL WORRYING: SEVERAL DAYS
GAD7 TOTAL SCORE: 8
3. WORRYING TOO MUCH ABOUT DIFFERENT THINGS: SEVERAL DAYS

## 2023-05-05 ENCOUNTER — NURSE TRIAGE (OUTPATIENT)
Dept: FAMILY MEDICINE | Facility: CLINIC | Age: 48
End: 2023-05-05
Payer: MEDICARE

## 2023-05-05 ENCOUNTER — APPOINTMENT (OUTPATIENT)
Dept: CT IMAGING | Facility: CLINIC | Age: 48
End: 2023-05-05
Attending: EMERGENCY MEDICINE
Payer: MEDICARE

## 2023-05-05 ENCOUNTER — HOSPITAL ENCOUNTER (EMERGENCY)
Facility: CLINIC | Age: 48
Discharge: HOME | End: 2023-05-05
Attending: EMERGENCY MEDICINE | Admitting: EMERGENCY MEDICINE
Payer: MEDICARE

## 2023-05-05 VITALS
SYSTOLIC BLOOD PRESSURE: 114 MMHG | WEIGHT: 300 LBS | HEART RATE: 82 BPM | OXYGEN SATURATION: 98 % | BODY MASS INDEX: 35.42 KG/M2 | DIASTOLIC BLOOD PRESSURE: 83 MMHG | TEMPERATURE: 98 F | HEIGHT: 77 IN | RESPIRATION RATE: 16 BRPM

## 2023-05-05 DIAGNOSIS — R15.9 INCONTINENCE OF FECES, UNSPECIFIED FECAL INCONTINENCE TYPE: ICD-10-CM

## 2023-05-05 LAB
ALBUMIN SERPL BCG-MCNC: 4.7 G/DL (ref 3.5–5.2)
ALP SERPL-CCNC: 70 U/L (ref 40–129)
ALT SERPL W P-5'-P-CCNC: 25 U/L (ref 10–50)
ANION GAP SERPL CALCULATED.3IONS-SCNC: 12 MMOL/L (ref 7–15)
AST SERPL W P-5'-P-CCNC: 31 U/L (ref 10–50)
BASOPHILS # BLD AUTO: 0.1 10E3/UL (ref 0–0.2)
BASOPHILS NFR BLD AUTO: 1 %
BILIRUB SERPL-MCNC: 0.2 MG/DL
BUN SERPL-MCNC: 8.6 MG/DL (ref 6–20)
CALCIUM SERPL-MCNC: 10.1 MG/DL (ref 8.6–10)
CHLORIDE SERPL-SCNC: 105 MMOL/L (ref 98–107)
CREAT SERPL-MCNC: 1.01 MG/DL (ref 0.67–1.17)
DEPRECATED HCO3 PLAS-SCNC: 22 MMOL/L (ref 22–29)
EOSINOPHIL # BLD AUTO: 0.2 10E3/UL (ref 0–0.7)
EOSINOPHIL NFR BLD AUTO: 2 %
ERYTHROCYTE [DISTWIDTH] IN BLOOD BY AUTOMATED COUNT: 12.2 % (ref 10–15)
GFR SERPL CREATININE-BSD FRML MDRD: >90 ML/MIN/1.73M2
GLUCOSE SERPL-MCNC: 96 MG/DL (ref 70–99)
HCT VFR BLD AUTO: 44.9 % (ref 40–53)
HGB BLD-MCNC: 15.3 G/DL (ref 13.3–17.7)
IMM GRANULOCYTES # BLD: 0 10E3/UL
IMM GRANULOCYTES NFR BLD: 0 %
LYMPHOCYTES # BLD AUTO: 3.6 10E3/UL (ref 0.8–5.3)
LYMPHOCYTES NFR BLD AUTO: 38 %
MCH RBC QN AUTO: 32.3 PG (ref 26.5–33)
MCHC RBC AUTO-ENTMCNC: 34.1 G/DL (ref 31.5–36.5)
MCV RBC AUTO: 95 FL (ref 78–100)
MONOCYTES # BLD AUTO: 1 10E3/UL (ref 0–1.3)
MONOCYTES NFR BLD AUTO: 11 %
NEUTROPHILS # BLD AUTO: 4.5 10E3/UL (ref 1.6–8.3)
NEUTROPHILS NFR BLD AUTO: 48 %
NRBC # BLD AUTO: 0 10E3/UL
NRBC BLD AUTO-RTO: 0 /100
PLATELET # BLD AUTO: 240 10E3/UL (ref 150–450)
POTASSIUM SERPL-SCNC: 4.4 MMOL/L (ref 3.4–5.3)
PROT SERPL-MCNC: 7.1 G/DL (ref 6.4–8.3)
RBC # BLD AUTO: 4.74 10E6/UL (ref 4.4–5.9)
SODIUM SERPL-SCNC: 139 MMOL/L (ref 136–145)
WBC # BLD AUTO: 9.3 10E3/UL (ref 4–11)

## 2023-05-05 PROCEDURE — 99284 EMERGENCY DEPT VISIT MOD MDM: CPT | Mod: 25

## 2023-05-05 PROCEDURE — 85025 COMPLETE CBC W/AUTO DIFF WBC: CPT | Performed by: EMERGENCY MEDICINE

## 2023-05-05 PROCEDURE — 36415 COLL VENOUS BLD VENIPUNCTURE: CPT | Performed by: EMERGENCY MEDICINE

## 2023-05-05 PROCEDURE — 74176 CT ABD & PELVIS W/O CONTRAST: CPT | Mod: MG

## 2023-05-05 PROCEDURE — 80053 COMPREHEN METABOLIC PANEL: CPT | Performed by: EMERGENCY MEDICINE

## 2023-05-05 NOTE — ED TRIAGE NOTES
Pt has been having fecal incontinence at night for trhe past few nights   Pt has recent spine surgery

## 2023-05-05 NOTE — TELEPHONE ENCOUNTER
"Pt advised per protocol to go to ED now, and schedule a hospital f/u appointment with PCP within the timeframe recommended by hospital provider after he has been discharged. Pt states he will go to [facility]. Pt indicates understanding of issues and agrees with the plan.    Reason for Disposition    Loss of bladder or bowel control (urine or bowel incontinence; wetting self, leaking stool) of new-onset    Additional Information    Negative: Confusion, disorientation, or hallucinations is main symptom    Negative: Dizziness is main symptom    Negative: Followed a head injury within last 3 days    Negative: Difficult to awaken or acting confused (e.g., disoriented, slurred speech)    Negative: New neurologic deficit that is present NOW, sudden onset of ANY of the following: * Weakness of the face, arm, or leg on one side of the body* Numbness of the face, arm, or leg on one side of the body* Loss of speech or garbled speech    Negative: Sounds like a life-threatening emergency to the triager    Answer Assessment - Initial Assessment Questions  1. SYMPTOM: \"What is the main symptom you are concerned about?\" (e.g., weakness, numbness)      Fecal incontinence 5 times in the past 3 weeks occurring overnight. Pt states he has known nerve compression in his back and is concerned if this is related. Pt has reached out to his Neurologist and GI provider and had to leave messages for the care team without a response.    2. ONSET: \"When did this start?\" (minutes, hours, days; while sleeping)      3 weeks ago    3. LAST NORMAL: \"When was the last time you (the patient) were normal (no symptoms)?\"      3 weeks ago    4. PATTERN \"Does this come and go, or has it been constant since it started?\"  \"Is it present now?\"      Intermittent occurring 5 times in 3 weeks and pt inquires if this may be related to the known nerve compression in his back and inquires if he should go to ED now since he has not been advised by " the specialists he has outreached to.    Protocols used: NEUROLOGIC DEFICIT-A-OH    JAVON GomezN, RN

## 2023-05-05 NOTE — ED PROVIDER NOTES
History     Chief Complaint:  fecal incontience and bowel issues    HPI   Joel Pineda is a 47 year old male with a history of diverticulitis, GERD, gastroparesis who presents with loose stools including fecal incontinence/accidents at night the last few nights.  No associated severe abdominal pain, vomiting, blood in the stools.  Had back surgery several years ago L5/S1.  Has not noted any recurrent symptoms.  No back pain, leg weakness/pain/numbness, groin numbness, difficulty voiding.  No recent back injuries.  Has an autonomic condition as well.  Sees [facility] related to gastroparesis.  Tried one dose of imodium.      Independent Historian: Patient     Review of External Notes: Colonoscopy 9/15/20; polyps, diverticulitis throughout colon    ROS:  Review of Systems  Relevant ROS as above.    Allergies:  Amoxicillin-Pot Clavulanate  Banana  Nitroglycerin  Penicillins  Provigil [Modafinil]  Gadolinium  Influenza Virus Vaccine  Ketoconazole  Dye [Contrast Dye]  Gabapentin  Golimumab  Naproxen  Neurontin [Gabapentin]  Nortriptyline  Nystatin  Varicella Virus Vaccine Live  Baclofen  Flagyl [Metronidazole Hcl]  Latex  Metronidazole     Medications:    acetaminophen 500 MG CAPS  albuterol (PROAIR HFA/PROVENTIL HFA/VENTOLIN HFA) 108 (90 Base) MCG/ACT inhaler  aspirin (ASA) 81 MG tablet  budesonide-formoterol (SYMBICORT) 80-4.5 MCG/ACT Inhaler  buPROPion (WELLBUTRIN XL) 300 MG 24 hr tablet  cholecalciferol (D3-50) 1250 mcg (92775 units) capsule  clindamycin (CLEOCIN T) 1 % external lotion  clonazePAM (KLONOPIN) 0.5 MG tablet  cyanocobalamin (CYANOCOBALAMIN) 1000 MCG/ML injection  DULoxetine (CYMBALTA) 60 MG capsule  EPINEPHrine (ANY BX GENERIC EQUIV) 0.3 MG/0.3ML injection 2-pack  eszopiclone (LUNESTA) 3 MG tablet  etanercept (ENBREL SURECLICK) 50 MG/ML autoinjector  famotidine (PEPCID) 20 MG tablet  fenofibrate (TRICOR) 48 MG tablet  fluticasone-salmeterol (ADVAIR-HFA) 45-21 MCG/ACT inhaler  levothyroxine  "(SYNTHROID/LEVOTHROID) 75 MCG tablet  lidocaine (XYLOCAINE) 5 % external ointment  melatonin 3 MG tablet  methocarbamol (ROBAXIN) 500 MG tablet  metoclopramide (REGLAN) 5 MG tablet  metoprolol succinate ER (TOPROL XL) 200 MG 24 hr tablet  montelukast (SINGULAIR) 10 MG tablet  nabumetone (RELAFEN) 500 MG tablet  naloxone (NARCAN) 4 MG/0.1ML nasal spray  olopatadine (PATADAY) 0.2 % ophthalmic solution  omega-3 acid ethyl esters (LOVAZA) 1 g capsule  omeprazole (PRILOSEC) 20 MG DR capsule  order for DME  order for DME  oxyCODONE (ROXICODONE) 5 MG tablet  pregabalin (LYRICA) 150 MG capsule  pseudoePHEDrine-guaiFENesin (MUCINEX D)  MG 12 hr tablet  pyridostigmine (MESTINON) 60 MG tablet  ramipril (ALTACE) 10 MG capsule  risperiDONE (RISPERDAL) 0.5 MG tablet  rizatriptan (MAXALT) 10 MG tablet  rosuvastatin (CRESTOR) 40 MG tablet  Semaglutide, 1 MG/DOSE, 4 MG/3ML SOPN  sodium fluoride 1.1 % CREA  spacer ([employer]) holding chamber  syringe/needle, disp, (BD INTEGRA SYRINGE) 25G X 1\" 3 ML MISC  triamcinolone (KENALOG) 0.1 % external cream  valACYclovir (VALTREX) 500 MG tablet  vitamin B complex with vitamin C (STRESS TAB) tablet  ZINC SULFATE-VITAMIN C MT        Past Medical History:    Past Medical History:   Diagnosis Date     Acne      Acquired hypothyroidism      Allergic state      Ankylosing spondylitis lumbar region (H)      Ankylosing spondylitis of sacral region (H)      Anxiety      Bipolar 2 disorder (H)      Chest pain      Chronic pain      DDD (degenerative disc disease), lumbar      Depressive disorder      Diabetes (H)      Diverticulosis      Facet arthritis of cervical region      Gastroesophageal reflux disease      Hypertension      IBS (irritable bowel syndrome)      Intracranial arachnoid cyst      Major depressive disorder, recurrent episode (H)      Major depressive disorder, recurrent episode, severe (H) 12/2/2020     MDD (major depressive disorder), recurrent severe, without " psychosis (H) 7/21/2021     Mixed dyslipidemia 4/6/2023     LLOYD (obstructive sleep apnea)- mild (AHI 11)      Polyneuropathy      Pulmonary embolism (H)      Skin exam, screening for cancer 12/3/2013     Sleep apnea      Uncomplicated asthma        Past Surgical History:    Past Surgical History:   Procedure Laterality Date     BACK SURGERY  10/2007    lumbar discectomy L5-S1     BIOPSY  09/15/2020    Colon Adenomas x2     COLONOSCOPY      Note: colonoscopy scheduled with [facility] on Friday, 9/4/15     COSMETIC SURGERY  2012    Nose Exterior - functional     GI SURGERY  08/2013    Sigmoidectomy     HERNIA REPAIR, UMBILICAL  08/23/2011    Dr. Evan whiting     INCISION AND DRAINAGE, ABSCESS, COMPLEX  08/23/2011    umbilical, Dr. Evan Beavers     LAPAROSCOPIC ASSISTED COLECTOMY LEFT (DESCENDING)  08/15/2013    Procedure: LAPAROSCOPIC ASSISTED COLECTOMY LEFT (DESCENDING);  Laparoscopic Hand Assisted Sigmoid Resection, Mobilization of Splenic Fissure, coloproctoscopy, *Latex Free Room* Anesthesia General with Pain block  ;  Surgeon: Aurora Justice MD;  Location: [facility] OR     NERVE SURGERY  08/18/2011    RF ablation @ L3-S1 @ [facility]     RECONSTRUCT NOSE AND SEPTUM (FUNCTIONAL)  10/14/2011    Procedure:RECONSTRUCT NOSE AND SEPTUM (FUNCTIONAL); Functional Septorhinoplasty, Turbinate Reduction, ; Surgeon:CEDRIC CUEVAS; Location:[facility] OR     SINUS SURGERY  10/01/2001    ethmoidectomy chronic sinusitis     SOFT TISSUE SURGERY  4/7/2022    Hidradenitis Suppurativa surgery        Family History:    family history includes Anxiety Disorder in his brother, father, mother, and sister; Breast Cancer in his mother; Colon Polyps in his mother; Depression in his brother, father, mother, paternal grandfather, and sister; Diabetes in his mother; Heart Disease in his maternal grandfather and maternal grandmother; Hyperlipidemia in his father; Hypertension in his father and mother; Musculoskeletal Disorder in his brother, father, and  "mother; Obesity in his mother; Osteoporosis in his mother; Other Cancer in an other family member; Psychotic Disorder in his paternal grandfather; Substance Abuse in his brother, brother, father, and sister; Suicide in his paternal grandfather; Thyroid Disease in his mother; Ulcerative Colitis in his mother.    Social History:   reports that he has never smoked. He has never used smokeless tobacco. He reports that he does not currently use alcohol. He reports that he does not use drugs.  PCP: Ksenia Lyles     Physical Exam     Patient Vitals for the past 24 hrs:   BP Temp Temp src Pulse Resp SpO2 Height Weight   05/05/23 1439 114/83 98  F (36.7  C) Oral 82 16 98 % 1.956 m (6' 5\") 136.1 kg (300 lb)        Physical Exam  Eyes:  Sclera white; Pupils are equal and round  ENT:    External ears and nares normal   Resp:  Non-labored, no retractions or accessory muscle use  GI:  Abdomen is soft, non-tender, non-distended    No rebound tenderness or peritoneal features  Rectal:  Deferred due to patient being in triage at time of results discussion and plan to follow up outpatient  MS:  Moves all extremities  Skin:  Warm and dry  Neuro:  Speech is normal and fluent. No apparent deficit.    Emergency Department Course     Imaging:  CT Abdomen Pelvis w/o Contrast   Final Result   IMPRESSION:    1.  No acute abnormality in the abdomen or pelvis.      CAROLYN PICKARD MD            SYSTEM ID:  [id number]        Report per radiology    Laboratory:  Labs Ordered and Resulted from Time of ED Arrival to Time of ED Departure   COMPREHENSIVE METABOLIC PANEL - Abnormal       Result Value    Sodium 139      Potassium 4.4      Chloride 105      Carbon Dioxide (CO2) 22      Anion Gap 12      Urea Nitrogen 8.6      Creatinine 1.01      Calcium 10.1 (*)     Glucose 96      Alkaline Phosphatase 70      AST 31      ALT 25      Protein Total 7.1      Albumin 4.7      Bilirubin Total 0.2      GFR Estimate >90     CBC WITH PLATELETS AND " DIFFERENTIAL    WBC Count 9.3      RBC Count 4.74      Hemoglobin 15.3      Hematocrit 44.9      MCV 95      MCH 32.3      MCHC 34.1      RDW 12.2      Platelet Count 240      % Neutrophils 48      % Lymphocytes 38      % Monocytes 11      % Eosinophils 2      % Basophils 1      % Immature Granulocytes 0      NRBCs per 100 WBC 0      Absolute Neutrophils 4.5      Absolute Lymphocytes 3.6      Absolute Monocytes 1.0      Absolute Eosinophils 0.2      Absolute Basophils 0.1      Absolute Immature Granulocytes 0.0      Absolute NRBCs 0.0     ENTERIC BACTERIA AND VIRUS PANEL BY BIBI STOOL   C. DIFFICILE TOXIN B PCR WITH REFLEX TO C. DIFFICILE ANTIGEN AND TOXINS A/B EIA        Procedures     Emergency Department Course & Assessments:             Interventions:  Medications - No data to display     Independent Interpretation (X-rays, CTs, rhythm strip):  CT, agree no large amount of fat stranding or free fluid     Medical Decision Making:  Intestinal infection could contribute to the changes that he is noted.  However this was not supported on imaging.  Loss of control of stools can also be seen with spinal pathology.  However he has had no other symptoms to suggest that this would be the case.  There have been no recent diet changes or medication changes that could affect absorption and intestinal function.  Stool test for enteric pathogens and C. difficile were ordered but he was unable to provide a sample in triage.  At this time he would prefer to leave.  Orders were switched to outpatient collection.  He will be following up with his outpatient providers.  Return immediately for the development of new or worsening symptoms.  Okay to continue to use imodium.    Diagnosis:    ICD-10-CM    1. Incontinence of feces, unspecified fecal incontinence type  R15.9 Enteric Bacteria and Virus Panel by BIBI Stool     C. difficile Toxin B PCR with reflex to C. difficile Antigen and Toxins A/B EIA           Discharge  Medications:  Discharge Medication List as of 5/5/2023  6:19 PM              Faith Holbrook MD  05/06/23 [id number]

## 2023-05-05 NOTE — DISCHARGE INSTRUCTIONS
Imodium dosing:  First dose 4mg (two tablets).  One 2mg tablet with each episode of diarrhea, maximum 8 tablets (16mg) per day    Your stool studies were switched to home collection

## 2023-05-07 ENCOUNTER — MYC REFILL (OUTPATIENT)
Dept: FAMILY MEDICINE | Facility: CLINIC | Age: 48
End: 2023-05-07
Payer: MEDICARE

## 2023-05-07 DIAGNOSIS — E78.5 HYPERLIPIDEMIA LDL GOAL <70: ICD-10-CM

## 2023-05-07 DIAGNOSIS — E78.1 HYPERTRIGLYCERIDEMIA: ICD-10-CM

## 2023-05-07 ASSESSMENT — PAIN SCALES - PAIN ENJOYMENT GENERAL ACTIVITY SCALE (PEG)
INTERFERED_ENJOYMENT_LIFE: 4
INTERFERED_GENERAL_ACTIVITY: 4
AVG_PAIN_PASTWEEK: 4
PEG_TOTALSCORE: 4

## 2023-05-08 ENCOUNTER — OFFICE VISIT (OUTPATIENT)
Dept: PALLIATIVE MEDICINE | Facility: CLINIC | Age: 48
End: 2023-05-08
Attending: NURSE PRACTITIONER
Payer: MEDICARE

## 2023-05-08 VITALS — HEART RATE: 76 BPM | DIASTOLIC BLOOD PRESSURE: 80 MMHG | SYSTOLIC BLOOD PRESSURE: 119 MMHG

## 2023-05-08 DIAGNOSIS — N50.811 TESTICULAR PAIN, RIGHT: ICD-10-CM

## 2023-05-08 DIAGNOSIS — S31.809S GLUTEAL CLEFT WOUND, UNSPECIFIED LATERALITY, SEQUELA: ICD-10-CM

## 2023-05-08 DIAGNOSIS — M79.2 NEUROPATHIC PAIN: Primary | ICD-10-CM

## 2023-05-08 PROCEDURE — 99214 OFFICE O/P EST MOD 30 MIN: CPT | Performed by: PAIN MEDICINE

## 2023-05-08 RX ORDER — LIDOCAINE/PRILOCAINE 2.5 %-2.5%
CREAM (GRAM) TOPICAL PRN
Qty: 30 G | Refills: 3 | Status: SHIPPED | OUTPATIENT
Start: 2023-05-08 | End: 2023-07-21

## 2023-05-08 RX ORDER — OMEGA-3-ACID ETHYL ESTERS 1 G/1
CAPSULE, LIQUID FILLED ORAL
Qty: 360 CAPSULE | Refills: 0 | Status: SHIPPED | OUTPATIENT
Start: 2023-05-08 | End: 2023-08-01

## 2023-05-08 ASSESSMENT — PAIN SCALES - GENERAL: PAINLEVEL: MODERATE PAIN (4)

## 2023-05-08 NOTE — PATIENT INSTRUCTIONS
- Medication Management:   - Order placed for emla cream   - Further procedures recommended: not as this time   - could consider local scar infiltration    - consider cluneal vs pudendal nerve block   - Follow up:    - as needed     ----------------------------------------------------------------  Clinic Number:  [phone]   Call with any questions about your care and for scheduling assistance.   Calls are returned Monday through Friday between 8 AM and 4:30 PM. We usually get back to you within 2 business days depending on the issue/request.    If we are prescribing your medications:  For opioid medication refills, call the clinic or send a [employer] message 7 days in advance.  Please include:  Name of requested medication  Name of the pharmacy.  For non-opioid medications, call your pharmacy directly to request a refill. Please allow 3-4 days to be processed.   Per [place] State Law:  All controlled substance prescriptions must be filled within 30 days of being written.    For those controlled substances allowing refills, pickup must occur within 30 days of last fill.      We believe regular attendance is key to your success in our program!    Any time you are unable to keep your appointment we ask that you call us at least 24 hours in advance to cancel.This will allow us to offer the appointment time to another patient.   Multiple missed appointments may lead to dismissal from the clinic.

## 2023-05-08 NOTE — PROGRESS NOTES
[facility]    Date of visit: 5/8/2023    Reason for consultation:    Primary Care Provider is Ksenia Lyles.  Pain medications are being prescribed by n/a.    Please see the [facility] health questionnaire which the patient completed and reviewed with me in detail.  Patient is a Joel Pineda is a 47 year old malewho I was asked to see in consultation by Ksenia Lyles for evaluation of   Chief Complaint   Patient presents with     Pain   .   Chief Complaint:    Chief Complaint   Patient presents with     Pain       Pain history:  Joel Pineda is a 47 year old male who first started having problems with pain in left gluteal cleft    The reports starting after HS surgery  The pain is over the wound  The pain is over the left buttocks  Currently no pain over the rectum  No pain is the perinuem   Pain in the scrotum is currently reasonable  The pt denies any drainage from the area currently  The pain is better than before, but still sig affects his quality of life  The pt reports the pain varies in a nature and severity  The pain can sharp feel like turning the surgical procedure  Some benefit with donut  Some benfit [facility] lido cream although does not last   Pain tolerable but does affect his quality of life    Pain rating: intensity  Averages 4/10 on a 0-10 scale.    Current treatments include:  Lido cream         Other treatments have included:  Joel Pineda has been seen at a pain clinic in the past.  DV    Injections: not over that area     Past Medical History:  Past Medical History:   Diagnosis Date     Acne      Acquired hypothyroidism      Allergic state      Ankylosing spondylitis lumbar region (H)      Ankylosing spondylitis of sacral region (H)      Anxiety      Bipolar 2 disorder (H)      Chest pain     Chest pain, regulated w/BP meds. Clear arteries.     Chronic pain      DDD (degenerative disc disease), lumbar      Depressive  disorder      Diabetes (H)      Diverticulosis      Facet arthritis of cervical region      Gastroesophageal reflux disease      Hypertension      IBS (irritable bowel syndrome)      Intracranial arachnoid cyst      Major depressive disorder, recurrent episode (H)     Multiple psych providers - they manage meds August 2015: Provigil induced severe mood dis-function      Major depressive disorder, recurrent episode, severe (H) 12/2/2020     MDD (major depressive disorder), recurrent severe, without psychosis (H) 7/21/2021     Mixed dyslipidemia 4/6/2023     LLOYD (obstructive sleep apnea)- mild (AHI 11)      Polyneuropathy      Pulmonary embolism (H)      Skin exam, screening for cancer 12/3/2013     Sleep apnea      Uncomplicated asthma      Past Surgical History:  Past Surgical History:   Procedure Laterality Date     BACK SURGERY  10/2007    lumbar discectomy L5-S1     BIOPSY  09/15/2020    Colon Adenomas x2     COLONOSCOPY      Note: colonoscopy scheduled with [facility] on Friday, 9/4/15     COSMETIC SURGERY  2012    Nose Exterior - functional     GI SURGERY  08/2013    Sigmoidectomy     HERNIA REPAIR, UMBILICAL  08/23/2011    Dr. Evan whiting     INCISION AND DRAINAGE, ABSCESS, COMPLEX  08/23/2011    umbilical, Dr. Evan Beavers     LAPAROSCOPIC ASSISTED COLECTOMY LEFT (DESCENDING)  08/15/2013    Procedure: LAPAROSCOPIC ASSISTED COLECTOMY LEFT (DESCENDING);  Laparoscopic Hand Assisted Sigmoid Resection, Mobilization of Splenic Fissure, coloproctoscopy, *Latex Free Room* Anesthesia General with Pain block  ;  Surgeon: Aurora Justice MD;  Location: [facility] OR     NERVE SURGERY  08/18/2011    RF ablation @ L3-S1 @ [facility]     RECONSTRUCT NOSE AND SEPTUM (FUNCTIONAL)  10/14/2011    Procedure:RECONSTRUCT NOSE AND SEPTUM (FUNCTIONAL); Functional Septorhinoplasty, Turbinate Reduction, ; Surgeon:CEDRIC CUEVAS; Location:[facility] OR     SINUS SURGERY  10/01/2001    ethmoidectomy chronic sinusitis     SOFT TISSUE SURGERY  4/7/2022     Hidradenitis Suppurativa surgery     Medications:  Current Outpatient Medications   Medication Sig Dispense Refill     acetaminophen 500 MG CAPS Take 1,000 mg by mouth 3 times daily 60 capsule      albuterol (PROAIR HFA/PROVENTIL HFA/VENTOLIN HFA) 108 (90 Base) MCG/ACT inhaler Inhale 2 puffs into the lungs every 4 hours as needed for shortness of breath / dyspnea or wheezing 8.5 g 11     aspirin (ASA) 81 MG tablet Take 81 mg by mouth daily        budesonide-formoterol (SYMBICORT) 80-4.5 MCG/ACT Inhaler Inhale 2 puffs into the lungs 2 times daily 30.6 g 3     buPROPion (WELLBUTRIN XL) 300 MG 24 hr tablet Take 1 tablet by mouth daily       cholecalciferol (D3-50) 1250 mcg (49380 units) capsule TAKE ONE CAPSULE BY MOUTH EVERY 2 WEEKS. 24 capsule 0     clindamycin (CLEOCIN T) 1 % external lotion Apply bid for folliculitis 60 mL 6     clonazePAM (KLONOPIN) 0.5 MG tablet Take 1 mg by mouth At Bedtime For RBD       cyanocobalamin (CYANOCOBALAMIN) 1000 MCG/ML injection INJECT 1 ML INTO THE MUSCLE EVERY 30 DAYS 10 mL 0     DULoxetine (CYMBALTA) 60 MG capsule Take 120 mg by mouth daily        EPINEPHrine (ANY BX GENERIC EQUIV) 0.3 MG/0.3ML injection 2-pack Inject 0.3 mLs (0.3 mg) into the muscle once as needed for anaphylaxis 0.6 mL 3     eszopiclone (LUNESTA) 3 MG tablet Take 3 mg by mouth At Bedtime        etanercept (ENBREL SURECLICK) 50 MG/ML autoinjector Inject 50 mg Subcutaneous once a week . Hold for signs of infection, and seek medical attention. 4 mL 7     famotidine (PEPCID) 20 MG tablet Prior to administration of Humira every 2 weeks (Patient taking differently: Take 20 mg by mouth every 7 days Prior to Enbrel Injections to prevent skin reaction)       fenofibrate (TRICOR) 48 MG tablet Take 1 tablet (48 mg) by mouth daily 90 tablet 1     levothyroxine (SYNTHROID/LEVOTHROID) 75 MCG tablet TAKE ONE TABLET BY MOUTH EVERY MORNING 90 tablet 2     lidocaine (XYLOCAINE) 5 % external ointment APPLY TOPICALLY 4 TIMES  DAILY AS NEEDED FOR PAIN 50 g 1     melatonin 3 MG tablet Take 13 mg by mouth nightly as needed        methocarbamol (ROBAXIN) 500 MG tablet TAKE 1 - 2 TABLETS BY MOUTH 4 TIMES DAILY AS NEEDED FOR MUSCLE SPASMS 240 tablet 1     metoclopramide (REGLAN) 5 MG tablet Take 5 mg by mouth 4 times daily as needed       metoprolol succinate ER (TOPROL XL) 200 MG 24 hr tablet Take 1 tablet (200 mg) by mouth 2 times daily 180 tablet 1     montelukast (SINGULAIR) 10 MG tablet TAKE ONE TABLET BY MOUTH EVERY EVENING 90 tablet 3     nabumetone (RELAFEN) 500 MG tablet TAKE 1-2 TABLETS BY MOUTH TWICE DAILY AS NEEDED WITH FOOD FOR MODERATE PAIN 120 tablet 5     naloxone (NARCAN) 4 MG/0.1ML nasal spray Spray 1 spray (4 mg) into one nostril alternating nostrils once as needed for opioid reversal every 2-3 minutes until assistance arrives 0.2 mL 0     olopatadine (PATADAY) 0.2 % ophthalmic solution Place 1 drop into both eyes daily 2.5 mL 3     omega-3 acid ethyl esters (LOVAZA) 1 g capsule TAKE TWO CAPSULES BY MOUTH TWICE DAILY 360 capsule 0     omeprazole (PRILOSEC) 20 MG DR capsule Take 20 mg by mouth as needed       order for DME Equipment being ordered: lumbosacral belt/brace 1 Units 0     order for DME Respironics REMSTAR 60 Series Auto CPAP 9-13 cm H2O, Wisp nasal mask w/a large cushion and a chinstrap       oxyCODONE (ROXICODONE) 5 MG tablet Take 1 tablet (5 mg) by mouth every 6 hours as needed for pain (maximum 6 tablet(s) per day) 35 tablet 0     pregabalin (LYRICA) 150 MG capsule TAKE ONE CAPSULE BY MOUTH THREE TIMES DAILY 270 capsule 0     pseudoePHEDrine-guaiFENesin (MUCINEX D)  MG 12 hr tablet Take 1 tablet by mouth every 12 hours       pyridostigmine (MESTINON) 60 MG tablet Take 1 tablet by mouth 3 times daily       ramipril (ALTACE) 10 MG capsule TAKE ONE CAPSULE BY MOUTH ONCE DAILY 90 capsule 1     risperiDONE (RISPERDAL) 0.5 MG tablet Take 0.5 mg by mouth 2 times daily as needed       rizatriptan (MAXALT) 10 MG  "tablet Take 1 tablet (10 mg) by mouth at onset of headache for migraine May repeat in 2 hours. Max 3 tablets/24 hours. 30 tablet 1     rosuvastatin (CRESTOR) 40 MG tablet Take 1 tablet (40 mg) by mouth daily 90 tablet 1     Semaglutide, 1 MG/DOSE, 4 MG/3ML SOPN Inject 1 mg Subcutaneous once a week 3 mL 11     sodium fluoride 1.1 % CREA At Bedtime       spacer (Confluence TechnologiesBER JOHNNY) holding chamber To be used in conjunction with albuterol (PROAIR HFA/PROVENTIL HFA/VENTOLIN HFA) 108 (90 Base) MCG/ACT inhaler 1 each 0     syringe/needle, disp, (BD INTEGRA SYRINGE) 25G X 1\" 3 ML MISC USE WITH B12 INJECTIONS 12 each 0     triamcinolone (KENALOG) 0.1 % external cream APPLY TOPICALLY TWICE DAILY AS NEEDED FOR IRRITATION 30 g 0     valACYclovir (VALTREX) 500 MG tablet TAKE ONE TABLET BY MOUTH ONCE DAILY 90 tablet 2     vitamin B complex with vitamin C (STRESS TAB) tablet Take 1 tablet by mouth daily       ZINC SULFATE-VITAMIN C MT Take 1 tablet by mouth daily       fluticasone-salmeterol (ADVAIR-HFA) 45-21 MCG/ACT inhaler Inhale 2 puffs into the lungs 2 times daily (Patient not taking: Reported on 5/1/2023) 12 g 11     Allergies:     Allergies   Allergen Reactions     Amoxicillin-Pot Clavulanate Difficulty breathing     Banana Shortness Of Breath     Pt reports organic Banana is okay.      Nitroglycerin Palpitations     Penicillins Anaphylaxis     Provigil [Modafinil] Shortness Of Breath     headache     Gadolinium Hives and Itching     Patient was premedicated for the contrast allergy. He did still have a reaction a few hours after injection. Hives and itching. Dr. Gomez told tech to inform pt he should only have contrast again in the future when premedicated and at a hospital. Not at an outpatient facility.      Influenza Virus Vaccine Rash     Ketoconazole      Topical cream caused swelling and itching     Dye [Contrast Dye] Other (See Comments) and Hives     Moderate flushing, CT contrast     Gabapentin      Other " "reaction(s): hives     Golimumab      Hives, bradycardia, face swelling     Naproxen      Other reaction(s): Bleeding Gums     Neurontin [Gabapentin] Hives     Moderate hives     Nortriptyline Hives     Nystatin Hives     Varicella Virus Vaccine Live      Rash     Baclofen Other (See Comments)     Cognitive changes     Flagyl [Metronidazole Hcl] Palpitations and Hives     Latex Rash     Metronidazole Palpitations, Other (See Comments) and Rash     dizziness (versus ciprofloxacin taken at same time)     Social History:    History of chemical dependency treatment: n    Family history:  Family History   Problem Relation Age of Onset     Musculoskeletal Disorder Mother         back     Anxiety Disorder Mother      Colon Polyps Mother      Ulcerative Colitis Mother         and ischemic small intestine, surgery     Hypertension Mother      Breast Cancer Mother      Osteoporosis Mother      Diabetes Mother         Type 2, Diagnosed in 2014     Depression Mother         Takes Cymbalta to help with chronic pain + depx     Thyroid Disease Mother         Hypothyroidism     Obesity Mother         Under much better control latter half of 2015     Musculoskeletal Disorder Father         back     Substance Abuse Father         Alcohol     Hypertension Father      Hyperlipidemia Father      Depression Father         Off meds for many years. Seems \"ok\"     Anxiety Disorder Father      Heart Disease Maternal Grandmother      Heart Disease Maternal Grandfather      Psychotic Disorder Paternal Grandfather      Suicide Paternal Grandfather      Depression Paternal Grandfather         Pediatrician. Committed suicide by pistol in 1990.     Musculoskeletal Disorder Brother         back     Depression Brother         Expressed as anger and moodiness     Substance Abuse Brother         Alcohol     Anxiety Disorder Brother      Substance Abuse Sister         Alcohol     Depression Sister         Mental Health Therapist, yet no " anti-depressants?     Anxiety Disorder Sister         Mental Health Therapist, yet no anti-anxiety meds?     Other Cancer Other         Bladder     Substance Abuse Brother      Colon Cancer No family hx of      Crohn's Disease No family hx of      Anesthesia Reaction No family hx of      Cancer No family hx of         No family history of skin cancer       Physical Exam:  Vitals:    05/08/23 1414   BP: 119/80   Pulse: 76     Exam:  Constitutional: healthy, alert and no distress      Sensory:  (upper and lower extremities):   Light touch: normal    Allodynia: absent         Assessment/Plan:  Joel Pnieda is a 47 year old male who presents with the complaints of  Left gluteal cleft pain   Joel was seen today for pain.    Diagnoses and all orders for this visit:    Testicular pain, right  -     PAIN INJECTION EVAL/TREAT/FOLLOW UP    Gluteal cleft wound, unspecified laterality, sequela  -     PAIN INJECTION EVAL/TREAT/FOLLOW UP         - Medication Management:   Order placed for emla cream   - Further procedures recommended: not as this time   - could consider local scar infiltration    - consider cluneal vs pudendal nerve block   - Follow up:    - as needed     Total time spent was 20 minutes, and more than 50% of face to face time was spent counseling and/or coordination of care regarding principles of multidisciplinary care and medication management     Bubba Montgomery MD  [facility]  '

## 2023-05-09 ENCOUNTER — VIRTUAL VISIT (OUTPATIENT)
Dept: FAMILY MEDICINE | Facility: CLINIC | Age: 48
End: 2023-05-09
Payer: MEDICARE

## 2023-05-09 DIAGNOSIS — R15.9 INCONTINENCE OF FECES, UNSPECIFIED FECAL INCONTINENCE TYPE: Primary | ICD-10-CM

## 2023-05-09 DIAGNOSIS — R19.7 INTERMITTENT DIARRHEA: ICD-10-CM

## 2023-05-09 DIAGNOSIS — K31.84 GASTROPARESIS: ICD-10-CM

## 2023-05-09 DIAGNOSIS — M45.8 ANKYLOSING SPONDYLITIS OF SACRAL REGION (H): ICD-10-CM

## 2023-05-09 PROCEDURE — 99214 OFFICE O/P EST MOD 30 MIN: CPT | Mod: VID | Performed by: INTERNAL MEDICINE

## 2023-05-09 ASSESSMENT — PATIENT HEALTH QUESTIONNAIRE - PHQ9
SUM OF ALL RESPONSES TO PHQ QUESTIONS 1-9: 4
SUM OF ALL RESPONSES TO PHQ QUESTIONS 1-9: 4
10. IF YOU CHECKED OFF ANY PROBLEMS, HOW DIFFICULT HAVE THESE PROBLEMS MADE IT FOR YOU TO DO YOUR WORK, TAKE CARE OF THINGS AT HOME, OR GET ALONG WITH OTHER PEOPLE: VERY DIFFICULT

## 2023-05-09 NOTE — PROGRESS NOTES
Answers for HPI/ROS submitted by the patient on 5/2/2023  YUDI 7 TOTAL SCORE: 8    Tito is a 47 year old who is being evaluated via a billable video visit.      How would you like to obtain your AVS? MyChart  If the video visit is dropped, the invitation should be resent by: Text to cell phone: [phone]  Will anyone else be joining your video visit? No        Assessment & Plan     1.  Incontinence of feces occurring nocturnally for the past few weeks with associated loose stool/watery stools.  Has not had any recent antibiotics so unlikely to be C. difficile type colitis.  Has extensive diverticulosis which could play a role.  Last colonoscopy and gastroscopy on 9/15/2020 negative for inflammatory bowel disease or celiac disease, so unlikely to be that.  Antibacterial or viral infection is always a possibility and so do encourage him to bring in the stool sample to check that out as recommended when he was in the ER.  Lymphocytic colitis to be considered.  Patient on multiple medication including rosuvastatin, omeprazole and nabumetone which have been associated with microscopic colitis.  Irritable bowel syndrome with diarrhea to be considered though nighttime diarrhea and incontinence is usually not associated with IBS.  Advised to  connect with his gastroenterologist at [facility].  The clinical presentation does not fit in with spinal cord process.  The problem does not appear to be of anal splinter.    2.  Intermittent diarrhea in form of loose stool though he does not have more than 1 bowel movement a day.  This is more consistent with irritable bowel syndrome with diarrhea.      3.  Gastroparesis for which he is under care of [facility].    4.  Ankylosing spondylitis of the sacral region being treated with etanercept.         MED REC REQUIRED  Post Medication Reconciliation Status:       Perez Ospina MD  [facility]    Radha Francis is a 47 year old, presenting for the  following health issues:  Hospital F/U        5/9/2023     9:07 AM   Additional Questions   Roomed by Nanda NAVARRO     ED/UC Followup:    Facility: [facility] ED   Date of visit: 5/5/23  Reason for visit: Incontinence of feces    Current Status: Haven't had incontinence after ER, still having abdominal pain, nausea     47-year-old gentleman with multiple health issues as stated in the problem list which include gastroparesis and irritable bowel syndrome as well as extensive diverticulosis had been having nighttime fecal incontinence off and on for the past few weeks.  The stool will be watery and to a point that he would have to change his bedding.  Not having daytime incontinence.  Indicates he would have a bowel movement once a day or every other day and it could be formed stool or could be watery.  This been an ongoing issue for quite a while.  Number blood in the stool.  Yesterday he had nausea but no vomiting.  Yesterday when he did have stool incontinence, he had some lower abdominal cramping preceding it.  He did take Imodium few times and did seem to help.  He was evaluated in the emergency room on 5/5/2023 and had a CT scanning of the abdomen pelvis without contrast.  It showed evidence of diverticulosis but otherwise negative.  This was fifth CT scan of the abdomen pelvis in less than a year.  No outside travel.  No recent antibiotic use.  No fever or chills.  He is on multiple medication.    He is under care of [facility] for his gastrointestinal issues namely gastroparesis and irritable bowel syndrome.  His last gastroscopy and upper endoscopy was done 9/15/2020.  Diverticulosis was identified and testing for celiac disease was negative.      Review of Systems   Constitutional, HEENT, cardiovascular, pulmonary, GI, , musculoskeletal, neuro, skin, endocrine and psych systems are negative, except as otherwise noted.      Objective             Physical Exam   GENERAL: Healthy, alert and  no distress  EYES: Eyes grossly normal to inspection.  No discharge or erythema, or obvious scleral/conjunctival abnormalities.  RESP: No audible wheeze, cough, or visible cyanosis.  No visible retractions or increased work of breathing.    SKIN: Visible skin clear. No significant rash, abnormal pigmentation or lesions.  NEURO: Cranial nerves grossly intact.  Mentation and speech appropriate for age.  PSYCH: Mentation appears normal, affect normal/bright, judgement and insight intact, normal speech and appearance well-groomed.                Video-Visit Details    Type of service:  Video Visit   Video Start Time: 10:25 AM  Video End Time:10:41 AM    Originating Location (pt. Location): Home  Distant Location (provider location):  On-site  Platform used for Video Visit: Sophia

## 2023-05-13 ENCOUNTER — MYC REFILL (OUTPATIENT)
Dept: PULMONOLOGY | Facility: CLINIC | Age: 48
End: 2023-05-13
Payer: MEDICARE

## 2023-05-13 DIAGNOSIS — J45.30 MILD PERSISTENT ASTHMA, UNSPECIFIED WHETHER COMPLICATED: ICD-10-CM

## 2023-05-15 RX ORDER — INHALER, ASSIST DEVICES
SPACER (EA) MISCELLANEOUS
Qty: 1 EACH | Refills: 0 | Status: SHIPPED | OUTPATIENT
Start: 2023-05-15 | End: 2024-01-28

## 2023-05-17 ENCOUNTER — LAB (OUTPATIENT)
Dept: LAB | Facility: CLINIC | Age: 48
End: 2023-05-17
Payer: MEDICARE

## 2023-05-17 DIAGNOSIS — R15.9 INCONTINENCE OF FECES, UNSPECIFIED FECAL INCONTINENCE TYPE: ICD-10-CM

## 2023-05-19 ENCOUNTER — E-VISIT (OUTPATIENT)
Dept: FAMILY MEDICINE | Facility: CLINIC | Age: 48
End: 2023-05-19
Payer: MEDICARE

## 2023-05-19 DIAGNOSIS — M45.8 ANKYLOSING SPONDYLITIS OF SACRAL REGION (H): ICD-10-CM

## 2023-05-19 DIAGNOSIS — M51.369 DDD (DEGENERATIVE DISC DISEASE), LUMBAR: ICD-10-CM

## 2023-05-19 PROCEDURE — 99421 OL DIG E/M SVC 5-10 MIN: CPT | Performed by: FAMILY MEDICINE

## 2023-05-19 RX ORDER — METHYLPREDNISOLONE 4 MG
TABLET, DOSE PACK ORAL
Qty: 21 TABLET | Refills: 0 | Status: SHIPPED | OUTPATIENT
Start: 2023-05-19 | End: 2023-06-05

## 2023-05-19 RX ORDER — OXYCODONE HYDROCHLORIDE 5 MG/1
5 TABLET ORAL EVERY 6 HOURS PRN
Qty: 35 TABLET | Refills: 0 | Status: SHIPPED | OUTPATIENT
Start: 2023-05-19 | End: 2023-06-08

## 2023-05-22 ENCOUNTER — TRANSFERRED RECORDS (OUTPATIENT)
Dept: HEALTH INFORMATION MANAGEMENT | Facility: CLINIC | Age: 48
End: 2023-05-22
Payer: MEDICARE

## 2023-05-27 DIAGNOSIS — J45.30 MILD PERSISTENT ASTHMA: ICD-10-CM

## 2023-05-27 DIAGNOSIS — E53.8 B12 DEFICIENCY: ICD-10-CM

## 2023-05-27 DIAGNOSIS — M51.369 DDD (DEGENERATIVE DISC DISEASE), LUMBAR: ICD-10-CM

## 2023-05-29 ASSESSMENT — ENCOUNTER SYMPTOMS
DEPRESSION: 1
HEADACHES: 1
DIARRHEA: 1
CONSTIPATION: 1
BLOATING: 1
HOARSE VOICE: 1
MYALGIAS: 1
ABDOMINAL PAIN: 1
BOWEL INCONTINENCE: 1
STIFFNESS: 1
NAUSEA: 1
BACK PAIN: 1
LIGHT-HEADEDNESS: 1
HYPOTENSION: 1
MEMORY LOSS: 1
DECREASED CONCENTRATION: 1
NERVOUS/ANXIOUS: 1
NECK PAIN: 1
HEARTBURN: 1
ARTHRALGIAS: 1

## 2023-05-30 RX ORDER — PREGABALIN 150 MG/1
CAPSULE ORAL
Qty: 270 CAPSULE | Refills: 0 | Status: SHIPPED | OUTPATIENT
Start: 2023-05-30 | End: 2023-07-21

## 2023-05-30 RX ORDER — BUPIVACAINE HYDROCHLORIDE 2.5 MG/ML
10 INJECTION, SOLUTION EPIDURAL; INFILTRATION; INTRACAUDAL ONCE
Status: COMPLETED | OUTPATIENT
Start: 2023-05-01 | End: 2023-05-01

## 2023-05-30 RX ORDER — SYRINGE WITH NEEDLE, 1 ML 25GX5/8"
SYRINGE, EMPTY DISPOSABLE MISCELLANEOUS
Qty: 12 EACH | Refills: 0 | Status: SHIPPED | OUTPATIENT
Start: 2023-05-30 | End: 2024-02-23

## 2023-05-30 RX ORDER — MONTELUKAST SODIUM 10 MG/1
TABLET ORAL
Qty: 90 TABLET | Refills: 3 | Status: SHIPPED | OUTPATIENT
Start: 2023-05-30 | End: 2024-05-20

## 2023-05-30 NOTE — TELEPHONE ENCOUNTER
Ongoing care with pain clinic and Dr. Lyles.  Most recent visit with Dr. Lyles in March.  Additional refill of Lyrica given today.    PSK

## 2023-05-30 NOTE — TELEPHONE ENCOUNTER
Medication:     montelukast (SINGULAIR) 10 MG tablet 90 tablet 3 5/31/2022  No   Sig: TAKE ONE TABLET BY MOUTH EVERY EVENING     Date last written: 05/31/2022  Dispensed amount: 90 tabs  Refills: 3    Pt's last office visit: 06/08/2022  Next scheduled office visit: 06/08/2023      Per the RN/LPN medication refill protocol, writer is able to refill this request.       Slime Szymanski LPN  Pulmonary Medicine:  [facility]  Phone: [phone] Fax: [phone]

## 2023-05-30 NOTE — PROGRESS NOTES
Joel was seen today for pain.    Diagnoses and all orders for this visit:    Myofascial muscle pain  -     PAIN INJECTION EVAL/TREAT/FOLLOW UP  -     NO CHARGE LOS  -     bupivacaine (MARCAINE) 0.25% preservative free injection        Trigger points were identified by patient, and marked when appropriate.  The area was prepped with Chloroprep.    Using clean technique, injections were completed using a 25G, 3.5 inch needle.  After negative aspiration, injection was completed.  A total of 5 locations were injected.  When possible, tissue was retracted from the chest wall to avoid lung injury.    Muscle groups injected:  Bilateral quadratus lumborum  latissimus dorsi, paraspinal muscles     Injection solution contained:  9ml of 0.25% bupivacaine         Bubba Montgomery MD  [facility]

## 2023-06-05 ENCOUNTER — OFFICE VISIT (OUTPATIENT)
Dept: ENDOCRINOLOGY | Facility: CLINIC | Age: 48
End: 2023-06-05
Payer: MEDICARE

## 2023-06-05 VITALS
OXYGEN SATURATION: 97 % | HEART RATE: 92 BPM | BODY MASS INDEX: 34.51 KG/M2 | SYSTOLIC BLOOD PRESSURE: 93 MMHG | WEIGHT: 291 LBS | DIASTOLIC BLOOD PRESSURE: 66 MMHG

## 2023-06-05 DIAGNOSIS — E11.8 TYPE 2 DIABETES MELLITUS WITH COMPLICATION, WITHOUT LONG-TERM CURRENT USE OF INSULIN (H): Primary | ICD-10-CM

## 2023-06-05 LAB — HBA1C MFR BLD: 5.5 % (ref 4.3–?)

## 2023-06-05 PROCEDURE — 99214 OFFICE O/P EST MOD 30 MIN: CPT | Performed by: INTERNAL MEDICINE

## 2023-06-05 PROCEDURE — 83036 HEMOGLOBIN GLYCOSYLATED A1C: CPT | Performed by: INTERNAL MEDICINE

## 2023-06-05 NOTE — LETTER
6/5/2023         RE: Joel Pineda  [id number] [facility] [place] [address]        Dear Colleague,    Thank you for referring your patient, Joel Pineda, to the [facility]. Please see a copy of my visit note below.      Endocrinology and Diabetes Clinic      Follow up for hypothyroidism and T2DM      Assessment:  Middle-aged man with multiple medical problems including morbid obesity type 2 diabetes hypothyroidism dyslipidemia POTS chronic pain syndrome on chronic pain medications including all steroids.     Type 2 diabetes patient is doing very well on Ozempic. He had a 30 pounds weight loss since last July. Tolerates Ozempic overall well, although he is struggling with gastroparesis. Patient has troubles getting Ozempic by his primary care provider and therefore would like to be followed at this clinic.    Hypothyroidism doing well on levothyroxine 75 mcg daily      Plan:   Continue levothyroxine 75 mcg daily    Continue Ozempic to 1 mg weekly    This note was generated using computer recognized voice recognition. This might result in some expected imperfection.    Abbie Cuenca MD  Endocrinology and Diabetes  Telephone contact:  [facility] [place] [phone]  [facility] [phone]      Interval history  Has been on Ozempic 1 mg weekly, started treatment in 2022  Initial problems GI related, has current flare of gastroparesis, patient cannot tell better there is any relationship to Ozempic, however overall gastroparesis has been longstanding        HPI:   Joel Pineda is a 47 year old male is coming in in regards to question about low testosterone.  Patient was evaluated for testicular varices by urology.  Labs are drawn for testosterone.    Low libido decrease in sex drive, years  Erectile dysfunction no med    Sleep: snoring CPAP,     Urinary symptoms: urinary frequency no, dysuria no, noturia once a nigth  Head trauma  no ECT  Testicular trauma no  Drug use oxcontin  Etoh use no  Depression yes    FH of prostate Ca no    History of neurologic disorder (spinal cord / brain injury, Parkinson, MS, stroke, major surgery) spinal surgery, foraminal narrowin  Vascular (HTN, DM, dyslipidemia, smoking, radiotherapy): yes  Medication: (Antihypertensives, antidepressants, antipsychotics, antiandrogens) yes multiple      Type 2 diabetes for several years.  Initial blood glucose above 200 repeatedly.  Patient has been able to lose about 10 pounds since Ozempic start.    Had recent eye exam, no DR  No numbness no tingling in his feet    Cardiovascular complications patient had an PE more than 10 years ago      Prior medications  Intolerant to metformin due to GI side effects  Intolerance to sulfonylureas due to hypoglycemia        Family history of type 2 diabetes in his mother      Current Problem List:   Patient Active Problem List   Diagnosis     Intermittent asthma     Chronic nonallergic rhinitis     Diverticulosis     GERD (gastroesophageal reflux disease)     Generalized anxiety disorder     LLOYD (obstructive sleep apnea)- mild (AHI 11)     Intracranial arachnoid cyst     Facet arthritis of cervical region     Acquired hypothyroidism     Chronic midline low back pain without sciatica     Irritable bowel syndrome with diarrhea     B12 deficiency     Essential hypertension with goal blood pressure less than 140/90     Chronic, continuous use of opioids     Ankylosing spondylitis of sacral region (H)     Morbid obesity (H)     Fatty infiltration of liver     DDD (degenerative disc disease), lumbar     Type 2 diabetes mellitus with complication, without long-term current use of insulin (H)     Peripheral polyneuropathy     History of pulmonary embolism     Ingrown toenail     Hypertriglyceridemia     Gastroparesis     Orthostatic dizziness     POTS (postural orthostatic tachycardia syndrome)     PHN (postherpetic neuralgia)     Dysautonomia  (H)     Chronic pain syndrome     Borderline personality disorder (H)     MDD (major depressive disorder), recurrent severe, without psychosis (H)     Folliculitis     Immunosuppression (H)     Small fiber neuropathy     RBD (REM behavioral disorder)     Mixed dyslipidemia                Past Medical and Past Surgical History:  Past Medical History:   Diagnosis Date     Acne      Acquired hypothyroidism      Allergic state      Ankylosing spondylitis lumbar region (H)      Ankylosing spondylitis of sacral region (H)      Anxiety      Bipolar 2 disorder (H)      Chest pain     Chest pain, regulated w/BP meds. Clear arteries.     Chronic pain      DDD (degenerative disc disease), lumbar      Depressive disorder      Diabetes (H)      Diverticulosis      Facet arthritis of cervical region      Gastroesophageal reflux disease      Hypertension      IBS (irritable bowel syndrome)      Intracranial arachnoid cyst      Major depressive disorder, recurrent episode (H)     Multiple psych providers - they manage meds August 2015: Provigil induced severe mood dis-function      Major depressive disorder, recurrent episode, severe (H) 12/2/2020     MDD (major depressive disorder), recurrent severe, without psychosis (H) 7/21/2021     Mixed dyslipidemia 4/6/2023     LLOYD (obstructive sleep apnea)- mild (AHI 11)      Polyneuropathy      Pulmonary embolism (H)      Skin exam, screening for cancer 12/3/2013     Sleep apnea      Uncomplicated asthma        Past Surgical History:   Procedure Laterality Date     BACK SURGERY  10/2007    lumbar discectomy L5-S1     BIOPSY  09/15/2020    Colon Adenomas x2     COLONOSCOPY      Note: colonoscopy scheduled with [facility] on Friday, 9/4/15     COSMETIC SURGERY  2012    Nose Exterior - functional     GI SURGERY  08/2013    Sigmoidectomy     HERNIA REPAIR, UMBILICAL  08/23/2011    Dr. Evan whiting     INCISION AND DRAINAGE, ABSCESS, COMPLEX  08/23/2011    tomer, Dr. Evan Beavers      "LAPAROSCOPIC ASSISTED COLECTOMY LEFT (DESCENDING)  08/15/2013    Procedure: LAPAROSCOPIC ASSISTED COLECTOMY LEFT (DESCENDING);  Laparoscopic Hand Assisted Sigmoid Resection, Mobilization of Splenic Fissure, coloproctoscopy, *Latex Free Room* Anesthesia General with Pain block  ;  Surgeon: Aurora Justice MD;  Location: [facility] OR     NERVE SURGERY  08/18/2011    RF ablation @ L3-S1 @ [facility]     RECONSTRUCT NOSE AND SEPTUM (FUNCTIONAL)  10/14/2011    Procedure:RECONSTRUCT NOSE AND SEPTUM (FUNCTIONAL); Functional Septorhinoplasty, Turbinate Reduction, ; Surgeon:CEDRIC CUEVAS; Location:[facility] OR     SINUS SURGERY  10/01/2001    ethmoidectomy chronic sinusitis     SOFT TISSUE SURGERY  4/7/2022    Hidradenitis Suppurativa surgery       Medications:       Current Outpatient Medications   Medication Sig Dispense Refill     acetaminophen 500 MG CAPS Take 1,000 mg by mouth 3 times daily 60 capsule      albuterol (PROAIR HFA/PROVENTIL HFA/VENTOLIN HFA) 108 (90 Base) MCG/ACT inhaler Inhale 2 puffs into the lungs every 4 hours as needed for shortness of breath / dyspnea or wheezing 8.5 g 11     aspirin (ASA) 81 MG tablet Take 81 mg by mouth daily        B-D LUER-LUCILLE SYRINGE 25G X 1\" 3 ML MISC USE WITH B12 INJECTIONS 12 each 0     budesonide-formoterol (SYMBICORT) 80-4.5 MCG/ACT Inhaler Inhale 2 puffs into the lungs 2 times daily 30.6 g 3     buPROPion (WELLBUTRIN XL) 300 MG 24 hr tablet Take 1 tablet by mouth daily       cholecalciferol (D3-50) 1250 mcg (08516 units) capsule TAKE ONE CAPSULE BY MOUTH EVERY 2 WEEKS. 24 capsule 0     clindamycin (CLEOCIN T) 1 % external lotion Apply bid for folliculitis 60 mL 6     clonazePAM (KLONOPIN) 0.5 MG tablet Take 1 mg by mouth At Bedtime For RBD       cyanocobalamin (CYANOCOBALAMIN) 1000 MCG/ML injection INJECT 1 ML INTO THE MUSCLE EVERY 30 DAYS 10 mL 0     DULoxetine (CYMBALTA) 60 MG capsule Take 120 mg by mouth daily        EPINEPHrine (ANY BX GENERIC EQUIV) 0.3 MG/0.3ML injection 2-pack " Inject 0.3 mLs (0.3 mg) into the muscle once as needed for anaphylaxis 0.6 mL 3     eszopiclone (LUNESTA) 3 MG tablet Take 3 mg by mouth At Bedtime        etanercept (ENBREL SURECLICK) 50 MG/ML autoinjector Inject 50 mg Subcutaneous once a week . Hold for signs of infection, and seek medical attention. 4 mL 7     famotidine (PEPCID) 20 MG tablet Prior to administration of Humira every 2 weeks (Patient taking differently: Take 20 mg by mouth every 7 days Prior to Enbrel Injections to prevent skin reaction)       fenofibrate (TRICOR) 48 MG tablet Take 1 tablet (48 mg) by mouth daily 90 tablet 1     fluticasone-salmeterol (ADVAIR-HFA) 45-21 MCG/ACT inhaler Inhale 2 puffs into the lungs 2 times daily 12 g 11     levothyroxine (SYNTHROID/LEVOTHROID) 75 MCG tablet TAKE ONE TABLET BY MOUTH EVERY MORNING 90 tablet 2     lidocaine (XYLOCAINE) 5 % external ointment APPLY TOPICALLY 4 TIMES DAILY AS NEEDED FOR PAIN 50 g 1     lidocaine-prilocaine (EMLA) 2.5-2.5 % external cream Apply topically as needed for moderate pain 30 g 3     melatonin 3 MG tablet Take 13 mg by mouth nightly as needed        methocarbamol (ROBAXIN) 500 MG tablet TAKE 1 - 2 TABLETS BY MOUTH 4 TIMES DAILY AS NEEDED FOR MUSCLE SPASMS 240 tablet 1     methylPREDNISolone (MEDROL DOSEPAK) 4 MG tablet therapy pack Follow Package Directions 21 tablet 0     metoclopramide (REGLAN) 5 MG tablet Take 5 mg by mouth 4 times daily as needed       metoprolol succinate ER (TOPROL XL) 200 MG 24 hr tablet Take 1 tablet (200 mg) by mouth 2 times daily 180 tablet 1     montelukast (SINGULAIR) 10 MG tablet TAKE ONE TABLET BY MOUTH EVERY EVENING 90 tablet 3     nabumetone (RELAFEN) 500 MG tablet TAKE 1-2 TABLETS BY MOUTH TWICE DAILY AS NEEDED WITH FOOD FOR MODERATE PAIN 120 tablet 5     naloxone (NARCAN) 4 MG/0.1ML nasal spray Spray 1 spray (4 mg) into one nostril alternating nostrils once as needed for opioid reversal every 2-3 minutes until assistance arrives 0.2 mL 0      olopatadine (PATADAY) 0.2 % ophthalmic solution Place 1 drop into both eyes daily 2.5 mL 3     omega-3 acid ethyl esters (LOVAZA) 1 g capsule TAKE TWO CAPSULES BY MOUTH TWICE DAILY 360 capsule 0     omeprazole (PRILOSEC) 20 MG DR capsule Take 20 mg by mouth as needed       order for DME Equipment being ordered: lumbosacral belt/brace 1 Units 0     order for DME Respironics REMSTAR 60 Series Auto CPAP 9-13 cm H2O, Wisp nasal mask w/a large cushion and a chinstrap       oxyCODONE (ROXICODONE) 5 MG tablet Take 1 tablet (5 mg) by mouth every 6 hours as needed for pain (maximum 6 tablet(s) per day) 35 tablet 0     pregabalin (LYRICA) 150 MG capsule TAKE ONE CAPSULE BY MOUTH THREE TIMES DAILY 270 capsule 0     pseudoePHEDrine-guaiFENesin (MUCINEX D)  MG 12 hr tablet Take 1 tablet by mouth every 12 hours       pyridostigmine (MESTINON) 60 MG tablet Take 1 tablet by mouth 3 times daily       ramipril (ALTACE) 10 MG capsule TAKE ONE CAPSULE BY MOUTH ONCE DAILY 90 capsule 1     risperiDONE (RISPERDAL) 0.5 MG tablet Take 0.5 mg by mouth 2 times daily as needed       rizatriptan (MAXALT) 10 MG tablet Take 1 tablet (10 mg) by mouth at onset of headache for migraine May repeat in 2 hours. Max 3 tablets/24 hours. 30 tablet 1     rosuvastatin (CRESTOR) 40 MG tablet Take 1 tablet (40 mg) by mouth daily 90 tablet 1     Semaglutide, 1 MG/DOSE, 4 MG/3ML SOPN Inject 1 mg Subcutaneous once a week 3 mL 11     sodium fluoride 1.1 % CREA At Bedtime       spacer ([facility]) holding chamber To be used in conjunction with albuterol (PROAIR HFA/PROVENTIL HFA/VENTOLIN HFA) 108 (90 Base) MCG/ACT inhaler 1 each 0     triamcinolone (KENALOG) 0.1 % external cream APPLY TOPICALLY TWICE DAILY AS NEEDED FOR IRRITATION 30 g 0     valACYclovir (VALTREX) 500 MG tablet TAKE ONE TABLET BY MOUTH ONCE DAILY 90 tablet 2     vitamin B complex with vitamin C (STRESS TAB) tablet Take 1 tablet by mouth daily       ZINC SULFATE-VITAMIN C MT Take 1  tablet by mouth daily         Allergies:   Allergies   Allergen Reactions     Amoxicillin-Pot Clavulanate Difficulty breathing     Banana Shortness Of Breath     Pt reports organic Banana is okay.      Nitroglycerin Palpitations     Penicillins Anaphylaxis     Provigil [Modafinil] Shortness Of Breath     headache     Gadolinium Hives and Itching     Patient was premedicated for the contrast allergy. He did still have a reaction a few hours after injection. Hives and itching. Dr. Gomez told tech to inform pt he should only have contrast again in the future when premedicated and at a hospital. Not at an outpatient facility.      Influenza Virus Vaccine Rash     Ketoconazole      Topical cream caused swelling and itching     Dye [Contrast Dye] Other (See Comments) and Hives     Moderate flushing, CT contrast     Gabapentin      Other reaction(s): hives     Golimumab      Hives, bradycardia, face swelling     Naproxen      Other reaction(s): Bleeding Gums     Neurontin [Gabapentin] Hives     Moderate hives     Nortriptyline Hives     Nystatin Hives     Varicella Virus Vaccine Live      Rash     Baclofen Other (See Comments)     Cognitive changes     Flagyl [Metronidazole Hcl] Palpitations and Hives     Latex Rash     Metronidazole Palpitations, Other (See Comments) and Rash     dizziness (versus ciprofloxacin taken at same time)       Social History     Tobacco Use     Smoking status: Never     Smokeless tobacco: Never   Vaping Use     Vaping status: Never Used     Passive vaping exposure: Yes   Substance Use Topics     Alcohol use: Not Currently     Comment: occ 1/week       Family History   Problem Relation Age of Onset     Musculoskeletal Disorder Mother         back     Anxiety Disorder Mother      Colon Polyps Mother      Ulcerative Colitis Mother         and ischemic small intestine, surgery     Hypertension Mother      Breast Cancer Mother      Osteoporosis Mother      Diabetes Mother         Type 2,  "Diagnosed in 2014     Depression Mother         Takes Cymbalta to help with chronic pain + depx     Thyroid Disease Mother         Hypothyroidism     Obesity Mother         Under much better control latter half of 2015     Musculoskeletal Disorder Father         back     Substance Abuse Father         Alcohol     Hypertension Father      Hyperlipidemia Father      Depression Father         Off meds for many years. Seems \"ok\"     Anxiety Disorder Father      Heart Disease Maternal Grandmother      Heart Disease Maternal Grandfather      Psychotic Disorder Paternal Grandfather      Suicide Paternal Grandfather      Depression Paternal Grandfather         Pediatrician. Committed suicide by pistol in 1990.     Musculoskeletal Disorder Brother         back     Depression Brother         Expressed as anger and moodiness     Substance Abuse Brother         Alcohol     Anxiety Disorder Brother      Substance Abuse Sister         Alcohol     Depression Sister         Mental Health Therapist, yet no anti-depressants?     Anxiety Disorder Sister         Mental Health Therapist, yet no anti-anxiety meds?     Other Cancer Other         Bladder     Substance Abuse Brother      Colon Cancer No family hx of      Crohn's Disease No family hx of      Anesthesia Reaction No family hx of      Cancer No family hx of         No family history of skin cancer       Review of Systems:  12 point review of system was negative except for above mentioned    Physical Examination:  BP 95/68 (BP Location: Left arm, Patient Position: Sitting, Cuff Size: Adult Large)   Pulse 92   Wt 132 kg (291 lb)   SpO2 97%   BMI 34.51 kg/m      Wt Readings from Last 4 Encounters:   06/05/23 132 kg (291 lb)   05/05/23 136.1 kg (300 lb)   04/06/23 134.9 kg (297 lb 8 oz)   03/17/23 135.6 kg (299 lb)     10/19/22 : 140.6 kg (310 lb)  08/10/22 : 142.9 kg (315 lb)  07/11/22 : 143.8 kg (317 lb)  06/08/22 : 144.6 kg (318 lb 12.8 oz)      General: Well appearing tall " obese man  in no distress,  Eyes: EOMI. Sclerae and conjunctivae are clear.   HENT: No thyromegaly or mass.    Lymphatic: No cervical or supraclavicular lymphadenopathy.  Cardiovascular: RRR, with normal S1+S2 and no murmurs.   Skin: No rash or lesions. No abdominal striae.    Extremities: No peripheral edema.   Neurologic: No tremor with hands outstretched.      Labs and Studies:   Lab Results   Component Value Date     05/05/2023    CO2 22 05/05/2023    CHLORIDE 105 05/05/2023    CR 1.01 05/05/2023    TRIG 277 (H) 02/15/2023    HDL 34 (L) 02/15/2023    HGB 15.3 05/05/2023     Lab Results   Component Value Date    ALT 25 05/05/2023    AST 31 05/05/2023    BILITOTAL 0.2 05/05/2023    CR 1.01 05/05/2023     05/05/2023    TSH 1.56 02/15/2023    ALKPHOS 70 05/05/2023    TESTOSTTOTAL 194 (L) 08/03/2022    TESTOSTTOTAL 203 (L) 07/25/2022    TESTOSTTOTAL 354 03/19/2013    SHBGT 17 08/03/2022    SHBGT 15 07/25/2022    FT 5.12 08/03/2022    FT 5.64 07/25/2022    PSA 0.33 09/28/2015    TRIG 277 (H) 02/15/2023    TRIG 367 (H) 04/21/2022    TRIG 545 (H) 02/10/2022    LDL 44 02/15/2023    HDL 34 (L) 02/15/2023    HDL 38 (L) 04/21/2022    HDL 29 (L) 02/10/2022    PROLACTIN 25 (H) 01/19/2012     Lab Results   Component Value Date     05/05/2023    CHLORIDE 105 05/05/2023    CO2 22 05/05/2023    GLC 96 05/05/2023    CR 1.01 05/05/2023    CR 0.94 02/15/2023    CR 0.98 10/10/2022    CR 0.95 02/10/2022    CR 0.74 11/10/2021    ALYCE 10.1 (H) 05/05/2023    MAG 2.1 08/19/2013    ALBUMIN 4.7 05/05/2023    ALKPHOS 70 05/05/2023    LDL 44 02/15/2023    HDL 34 (L) 02/15/2023    TRIG 277 (H) 02/15/2023    TSH 1.56 02/15/2023    TSH 2.59 02/10/2022    TSH 2.11 02/10/2021     Lab Results   Component Value Date    MICROL 9 09/23/2022    MICROL 8 11/10/2021    MICROL 8 10/14/2020    MICROL 12 01/24/2020    MICROL <5 12/24/2018     Lab Results   Component Value Date    A1C 5.7 (H) 02/15/2023    A1C 6.4 (H) 09/23/2022    A1C 6.3  (H) 02/10/2022    A1C 6.0 (H) 08/19/2021    A1C 6.5 (H) 02/10/2021       Lab Results   Component Value Date    HGB 15.3 05/05/2023           Answers for HPI/ROS submitted by the patient on 5/29/2023  General Symptoms: No  Skin Symptoms: No  HENT Symptoms: Yes  EYE SYMPTOMS: No  HEART SYMPTOMS: Yes  LUNG SYMPTOMS: No  INTESTINAL SYMPTOMS: Yes  URINARY SYMPTOMS: No  REPRODUCTIVE SYMPTOMS: Yes  SKELETAL SYMPTOMS: Yes  BLOOD SYMPTOMS: No  NERVOUS SYSTEM SYMPTOMS: Yes  MENTAL HEALTH SYMPTOMS: Yes   Voice hoarseness: Yes  Dry mouth: Yes  Low blood pressure: Yes  Light-headedness: Yes  Heart burn or indigestion: Yes  Nausea: Yes  Abdominal pain: Yes  Bloating: Yes  Constipation: Yes  Diarrhea: Yes  Fecal incontinence: Yes  Erectile dysfunction: Yes  Reduced libido: Yes  Back pain: Yes  Muscle aches: Yes  Neck pain: Yes  Joint pain: Yes  Joint stiffness: Yes  Memory loss: Yes  Headache: Yes  Nervous or Anxious: Yes  Depression: Yes  Trouble thinking or concentrating: Yes        Again, thank you for allowing me to participate in the care of your patient.        Sincerely,        Abbie Cuenca MD

## 2023-06-05 NOTE — NURSING NOTE
Joel Pineda's goals for this visit include:   Chief Complaint   Patient presents with     Follow Up     Diabetes     Type ll; Low testosterone      Endocrine Problem       He requests these members of his care team be copied on today's visit information: Yes Dr. DELIA Lyles     PCP: Ksenia Lyles    Referring Provider:  No referring provider defined for this encounter.    BP 95/68 (BP Location: Left arm, Patient Position: Sitting, Cuff Size: Adult Large)   Pulse 92   Wt 132 kg (291 lb)   SpO2 97%   BMI 34.51 kg/m      Do you need any medication refills at today's visit? Not at this time, unless changes occur.         Radha Estevez, LUDA  Adult Endocrinology  [facility]

## 2023-06-05 NOTE — PROGRESS NOTES
[facility]inology [facility]      Follow up for hypothyroidism and T2DM      Assessment:  Middle-aged man with multiple medical problems including morbid obesity type 2 diabetes hypothyroidism dyslipidemia POTS chronic pain syndrome on chronic pain medications including all steroids.     Type 2 diabetes patient is doing very well on Ozempic. He had a 30 pounds weight loss since last July. Tolerates Ozempic overall well, although he is struggling with gastroparesis. Patient has troubles getting Ozempic by his primary care provider and therefore would like to be followed at this clinic.    Hypothyroidism doing well on levothyroxine 75 mcg daily      Plan:   Continue levothyroxine 75 mcg daily    Continue Ozempic to 1 mg weekly    This note was generated using computer recognized voice recognition. This might result in some expected imperfection.    Abbie Cuenca MD  Endocrinology and Diabetes  Telephone contact:  [facility] [phone]  [facility] [phone]      Interval history  Has been on Ozempic 1 mg weekly, started treatment in 2022  Initial problems GI related, has current flare of gastroparesis, patient cannot tell better there is any relationship to Ozempic, however overall gastroparesis has been longstanding        HPI:   Joel Pineda is a 47 year old male is coming in in regards to question about low testosterone.  Patient was evaluated for testicular varices by urology.  Labs are drawn for testosterone.    Low libido decrease in sex drive, years  Erectile dysfunction no med    Sleep: snoring CPAP,     Urinary symptoms: urinary frequency no, dysuria no, noturia once a nigth  Head trauma no ECT  Testicular trauma no  Drug use oxcontin  Etoh use no  Depression yes    FH of prostate Ca no    History of neurologic disorder (spinal cord / brain injury, Parkinson, MS, stroke, major surgery) spinal surgery, foraminal narrowin  Vascular (HTN,  DM, dyslipidemia, smoking, radiotherapy): yes  Medication: (Antihypertensives, antidepressants, antipsychotics, antiandrogens) yes multiple      Type 2 diabetes for several years.  Initial blood glucose above 200 repeatedly.  Patient has been able to lose about 10 pounds since Ozempic start.    Had recent eye exam, no DR  No numbness no tingling in his feet    Cardiovascular complications patient had an PE more than 10 years ago      Prior medications  Intolerant to metformin due to GI side effects  Intolerance to sulfonylureas due to hypoglycemia        Family history of type 2 diabetes in his mother      Current Problem List:   Patient Active Problem List   Diagnosis     Intermittent asthma     Chronic nonallergic rhinitis     Diverticulosis     GERD (gastroesophageal reflux disease)     Generalized anxiety disorder     LLOYD (obstructive sleep apnea)- mild (AHI 11)     Intracranial arachnoid cyst     Facet arthritis of cervical region     Acquired hypothyroidism     Chronic midline low back pain without sciatica     Irritable bowel syndrome with diarrhea     B12 deficiency     Essential hypertension with goal blood pressure less than 140/90     Chronic, continuous use of opioids     Ankylosing spondylitis of sacral region (H)     Morbid obesity (H)     Fatty infiltration of liver     DDD (degenerative disc disease), lumbar     Type 2 diabetes mellitus with complication, without long-term current use of insulin (H)     Peripheral polyneuropathy     History of pulmonary embolism     Ingrown toenail     Hypertriglyceridemia     Gastroparesis     Orthostatic dizziness     POTS (postural orthostatic tachycardia syndrome)     PHN (postherpetic neuralgia)     Dysautonomia (H)     Chronic pain syndrome     Borderline personality disorder (H)     MDD (major depressive disorder), recurrent severe, without psychosis (H)     Folliculitis     Immunosuppression (H)     Small fiber neuropathy     RBD (REM behavioral disorder)      Mixed dyslipidemia                Past Medical and Past Surgical History:  Past Medical History:   Diagnosis Date     Acne      Acquired hypothyroidism      Allergic state      Ankylosing spondylitis lumbar region (H)      Ankylosing spondylitis of sacral region (H)      Anxiety      Bipolar 2 disorder (H)      Chest pain     Chest pain, regulated w/BP meds. Clear arteries.     Chronic pain      DDD (degenerative disc disease), lumbar      Depressive disorder      Diabetes (H)      Diverticulosis      Facet arthritis of cervical region      Gastroesophageal reflux disease      Hypertension      IBS (irritable bowel syndrome)      Intracranial arachnoid cyst      Major depressive disorder, recurrent episode (H)     Multiple psych providers - they manage meds August 2015: Provigil induced severe mood dis-function      Major depressive disorder, recurrent episode, severe (H) 12/2/2020     MDD (major depressive disorder), recurrent severe, without psychosis (H) 7/21/2021     Mixed dyslipidemia 4/6/2023     LLOYD (obstructive sleep apnea)- mild (AHI 11)      Polyneuropathy      Pulmonary embolism (H)      Skin exam, screening for cancer 12/3/2013     Sleep apnea      Uncomplicated asthma        Past Surgical History:   Procedure Laterality Date     BACK SURGERY  10/2007    lumbar discectomy L5-S1     BIOPSY  09/15/2020    Colon Adenomas x2     COLONOSCOPY      Note: colonoscopy scheduled with [facility] on Friday, 9/4/15     COSMETIC SURGERY  2012    Nose Exterior - functional     GI SURGERY  08/2013    Sigmoidectomy     HERNIA REPAIR, UMBILICAL  08/23/2011    smallDr. Evan     INCISION AND DRAINAGE, ABSCESS, COMPLEX  08/23/2011    umbilical, Dr. Evan Beavers     LAPAROSCOPIC ASSISTED COLECTOMY LEFT (DESCENDING)  08/15/2013    Procedure: LAPAROSCOPIC ASSISTED COLECTOMY LEFT (DESCENDING);  Laparoscopic Hand Assisted Sigmoid Resection, Mobilization of Splenic Fissure, coloproctoscopy, *Latex Free Room* Anesthesia  "General with Pain block  ;  Surgeon: Aurora Justice MD;  Location: [facility] OR     NERVE SURGERY  08/18/2011    RF ablation @ L3-S1 @ [facility]     RECONSTRUCT NOSE AND SEPTUM (FUNCTIONAL)  10/14/2011    Procedure:RECONSTRUCT NOSE AND SEPTUM (FUNCTIONAL); Functional Septorhinoplasty, Turbinate Reduction, ; Surgeon:CEDRIC CUEVAS; Location:[facility] OR     SINUS SURGERY  10/01/2001    ethmoidectomy chronic sinusitis     SOFT TISSUE SURGERY  4/7/2022    Hidradenitis Suppurativa surgery       Medications:       Current Outpatient Medications   Medication Sig Dispense Refill     acetaminophen 500 MG CAPS Take 1,000 mg by mouth 3 times daily 60 capsule      albuterol (PROAIR HFA/PROVENTIL HFA/VENTOLIN HFA) 108 (90 Base) MCG/ACT inhaler Inhale 2 puffs into the lungs every 4 hours as needed for shortness of breath / dyspnea or wheezing 8.5 g 11     aspirin (ASA) 81 MG tablet Take 81 mg by mouth daily        B-D LUER-LUCILLE SYRINGE 25G X 1\" 3 ML MISC USE WITH B12 INJECTIONS 12 each 0     budesonide-formoterol (SYMBICORT) 80-4.5 MCG/ACT Inhaler Inhale 2 puffs into the lungs 2 times daily 30.6 g 3     buPROPion (WELLBUTRIN XL) 300 MG 24 hr tablet Take 1 tablet by mouth daily       cholecalciferol (D3-50) 1250 mcg (91982 units) capsule TAKE ONE CAPSULE BY MOUTH EVERY 2 WEEKS. 24 capsule 0     clindamycin (CLEOCIN T) 1 % external lotion Apply bid for folliculitis 60 mL 6     clonazePAM (KLONOPIN) 0.5 MG tablet Take 1 mg by mouth At Bedtime For RBD       cyanocobalamin (CYANOCOBALAMIN) 1000 MCG/ML injection INJECT 1 ML INTO THE MUSCLE EVERY 30 DAYS 10 mL 0     DULoxetine (CYMBALTA) 60 MG capsule Take 120 mg by mouth daily        EPINEPHrine (ANY BX GENERIC EQUIV) 0.3 MG/0.3ML injection 2-pack Inject 0.3 mLs (0.3 mg) into the muscle once as needed for anaphylaxis 0.6 mL 3     eszopiclone (LUNESTA) 3 MG tablet Take 3 mg by mouth At Bedtime        etanercept (ENBREL SURECLICK) 50 MG/ML autoinjector Inject 50 mg Subcutaneous once a week . Hold for " signs of infection, and seek medical attention. 4 mL 7     famotidine (PEPCID) 20 MG tablet Prior to administration of Humira every 2 weeks (Patient taking differently: Take 20 mg by mouth every 7 days Prior to Enbrel Injections to prevent skin reaction)       fenofibrate (TRICOR) 48 MG tablet Take 1 tablet (48 mg) by mouth daily 90 tablet 1     fluticasone-salmeterol (ADVAIR-HFA) 45-21 MCG/ACT inhaler Inhale 2 puffs into the lungs 2 times daily 12 g 11     levothyroxine (SYNTHROID/LEVOTHROID) 75 MCG tablet TAKE ONE TABLET BY MOUTH EVERY MORNING 90 tablet 2     lidocaine (XYLOCAINE) 5 % external ointment APPLY TOPICALLY 4 TIMES DAILY AS NEEDED FOR PAIN 50 g 1     lidocaine-prilocaine (EMLA) 2.5-2.5 % external cream Apply topically as needed for moderate pain 30 g 3     melatonin 3 MG tablet Take 13 mg by mouth nightly as needed        methocarbamol (ROBAXIN) 500 MG tablet TAKE 1 - 2 TABLETS BY MOUTH 4 TIMES DAILY AS NEEDED FOR MUSCLE SPASMS 240 tablet 1     methylPREDNISolone (MEDROL DOSEPAK) 4 MG tablet therapy pack Follow Package Directions 21 tablet 0     metoclopramide (REGLAN) 5 MG tablet Take 5 mg by mouth 4 times daily as needed       metoprolol succinate ER (TOPROL XL) 200 MG 24 hr tablet Take 1 tablet (200 mg) by mouth 2 times daily 180 tablet 1     montelukast (SINGULAIR) 10 MG tablet TAKE ONE TABLET BY MOUTH EVERY EVENING 90 tablet 3     nabumetone (RELAFEN) 500 MG tablet TAKE 1-2 TABLETS BY MOUTH TWICE DAILY AS NEEDED WITH FOOD FOR MODERATE PAIN 120 tablet 5     naloxone (NARCAN) 4 MG/0.1ML nasal spray Spray 1 spray (4 mg) into one nostril alternating nostrils once as needed for opioid reversal every 2-3 minutes until assistance arrives 0.2 mL 0     olopatadine (PATADAY) 0.2 % ophthalmic solution Place 1 drop into both eyes daily 2.5 mL 3     omega-3 acid ethyl esters (LOVAZA) 1 g capsule TAKE TWO CAPSULES BY MOUTH TWICE DAILY 360 capsule 0     omeprazole (PRILOSEC) 20 MG DR capsule Take 20 mg by mouth  as needed       order for DME Equipment being ordered: lumbosacral belt/brace 1 Units 0     order for DME Respironics REMSTAR 60 Series Auto CPAP 9-13 cm H2O, Wisp nasal mask w/a large cushion and a chinstrap       oxyCODONE (ROXICODONE) 5 MG tablet Take 1 tablet (5 mg) by mouth every 6 hours as needed for pain (maximum 6 tablet(s) per day) 35 tablet 0     pregabalin (LYRICA) 150 MG capsule TAKE ONE CAPSULE BY MOUTH THREE TIMES DAILY 270 capsule 0     pseudoePHEDrine-guaiFENesin (MUCINEX D)  MG 12 hr tablet Take 1 tablet by mouth every 12 hours       pyridostigmine (MESTINON) 60 MG tablet Take 1 tablet by mouth 3 times daily       ramipril (ALTACE) 10 MG capsule TAKE ONE CAPSULE BY MOUTH ONCE DAILY 90 capsule 1     risperiDONE (RISPERDAL) 0.5 MG tablet Take 0.5 mg by mouth 2 times daily as needed       rizatriptan (MAXALT) 10 MG tablet Take 1 tablet (10 mg) by mouth at onset of headache for migraine May repeat in 2 hours. Max 3 tablets/24 hours. 30 tablet 1     rosuvastatin (CRESTOR) 40 MG tablet Take 1 tablet (40 mg) by mouth daily 90 tablet 1     Semaglutide, 1 MG/DOSE, 4 MG/3ML SOPN Inject 1 mg Subcutaneous once a week 3 mL 11     sodium fluoride 1.1 % CREA At Bedtime       spacer (OPTICHAMBER JOHNNY) holding chamber To be used in conjunction with albuterol (PROAIR HFA/PROVENTIL HFA/VENTOLIN HFA) 108 (90 Base) MCG/ACT inhaler 1 each 0     triamcinolone (KENALOG) 0.1 % external cream APPLY TOPICALLY TWICE DAILY AS NEEDED FOR IRRITATION 30 g 0     valACYclovir (VALTREX) 500 MG tablet TAKE ONE TABLET BY MOUTH ONCE DAILY 90 tablet 2     vitamin B complex with vitamin C (STRESS TAB) tablet Take 1 tablet by mouth daily       ZINC SULFATE-VITAMIN C MT Take 1 tablet by mouth daily         Allergies:   Allergies   Allergen Reactions     Amoxicillin-Pot Clavulanate Difficulty breathing     Banana Shortness Of Breath     Pt reports organic Banana is okay.      Nitroglycerin Palpitations     Penicillins Anaphylaxis      Provigil [Modafinil] Shortness Of Breath     headache     Gadolinium Hives and Itching     Patient was premedicated for the contrast allergy. He did still have a reaction a few hours after injection. Hives and itching. Dr. Gomez told tech to inform pt he should only have contrast again in the future when premedicated and at a hospital. Not at an outpatient facility.      Influenza Virus Vaccine Rash     Ketoconazole      Topical cream caused swelling and itching     Dye [Contrast Dye] Other (See Comments) and Hives     Moderate flushing, CT contrast     Gabapentin      Other reaction(s): hives     Golimumab      Hives, bradycardia, face swelling     Naproxen      Other reaction(s): Bleeding Gums     Neurontin [Gabapentin] Hives     Moderate hives     Nortriptyline Hives     Nystatin Hives     Varicella Virus Vaccine Live      Rash     Baclofen Other (See Comments)     Cognitive changes     Flagyl [Metronidazole Hcl] Palpitations and Hives     Latex Rash     Metronidazole Palpitations, Other (See Comments) and Rash     dizziness (versus ciprofloxacin taken at same time)       Social History     Tobacco Use     Smoking status: Never     Smokeless tobacco: Never   Vaping Use     Vaping status: Never Used     Passive vaping exposure: Yes   Substance Use Topics     Alcohol use: Not Currently     Comment: occ 1/week       Family History   Problem Relation Age of Onset     Musculoskeletal Disorder Mother         back     Anxiety Disorder Mother      Colon Polyps Mother      Ulcerative Colitis Mother         and ischemic small intestine, surgery     Hypertension Mother      Breast Cancer Mother      Osteoporosis Mother      Diabetes Mother         Type 2, Diagnosed in 2014     Depression Mother         Takes Cymbalta to help with chronic pain + depx     Thyroid Disease Mother         Hypothyroidism     Obesity Mother         Under much better control latter half of 2015     Musculoskeletal Disorder Father          "back     Substance Abuse Father         Alcohol     Hypertension Father      Hyperlipidemia Father      Depression Father         Off meds for many years. Seems \"ok\"     Anxiety Disorder Father      Heart Disease Maternal Grandmother      Heart Disease Maternal Grandfather      Psychotic Disorder Paternal Grandfather      Suicide Paternal Grandfather      Depression Paternal Grandfather         Pediatrician. Committed suicide by pistol in 1990.     Musculoskeletal Disorder Brother         back     Depression Brother         Expressed as anger and moodiness     Substance Abuse Brother         Alcohol     Anxiety Disorder Brother      Substance Abuse Sister         Alcohol     Depression Sister         Mental Health Therapist, yet no anti-depressants?     Anxiety Disorder Sister         Mental Health Therapist, yet no anti-anxiety meds?     Other Cancer Other         Bladder     Substance Abuse Brother      Colon Cancer No family hx of      Crohn's Disease No family hx of      Anesthesia Reaction No family hx of      Cancer No family hx of         No family history of skin cancer       Review of Systems:  12 point review of system was negative except for above mentioned    Physical Examination:  BP 95/68 (BP Location: Left arm, Patient Position: Sitting, Cuff Size: Adult Large)   Pulse 92   Wt 132 kg (291 lb)   SpO2 97%   BMI 34.51 kg/m      Wt Readings from Last 4 Encounters:   06/05/23 132 kg (291 lb)   05/05/23 136.1 kg (300 lb)   04/06/23 134.9 kg (297 lb 8 oz)   03/17/23 135.6 kg (299 lb)     10/19/22 : 140.6 kg (310 lb)  08/10/22 : 142.9 kg (315 lb)  07/11/22 : 143.8 kg (317 lb)  06/08/22 : 144.6 kg (318 lb 12.8 oz)      General: Well appearing tall obese man  in no distress,  Eyes: EOMI. Sclerae and conjunctivae are clear.   HENT: No thyromegaly or mass.    Lymphatic: No cervical or supraclavicular lymphadenopathy.  Cardiovascular: RRR, with normal S1+S2 and no murmurs.   Skin: No rash or lesions. No " abdominal striae.    Extremities: No peripheral edema.   Neurologic: No tremor with hands outstretched.      Labs and Studies:   Lab Results   Component Value Date     05/05/2023    CO2 22 05/05/2023    CHLORIDE 105 05/05/2023    CR 1.01 05/05/2023    TRIG 277 (H) 02/15/2023    HDL 34 (L) 02/15/2023    HGB 15.3 05/05/2023     Lab Results   Component Value Date    ALT 25 05/05/2023    AST 31 05/05/2023    BILITOTAL 0.2 05/05/2023    CR 1.01 05/05/2023     05/05/2023    TSH 1.56 02/15/2023    ALKPHOS 70 05/05/2023    TESTOSTTOTAL 194 (L) 08/03/2022    TESTOSTTOTAL 203 (L) 07/25/2022    TESTOSTTOTAL 354 03/19/2013    SHBGT 17 08/03/2022    SHBGT 15 07/25/2022    FT 5.12 08/03/2022    FT 5.64 07/25/2022    PSA 0.33 09/28/2015    TRIG 277 (H) 02/15/2023    TRIG 367 (H) 04/21/2022    TRIG 545 (H) 02/10/2022    LDL 44 02/15/2023    HDL 34 (L) 02/15/2023    HDL 38 (L) 04/21/2022    HDL 29 (L) 02/10/2022    PROLACTIN 25 (H) 01/19/2012     Lab Results   Component Value Date     05/05/2023    CHLORIDE 105 05/05/2023    CO2 22 05/05/2023    GLC 96 05/05/2023    CR 1.01 05/05/2023    CR 0.94 02/15/2023    CR 0.98 10/10/2022    CR 0.95 02/10/2022    CR 0.74 11/10/2021    ALYCE 10.1 (H) 05/05/2023    MAG 2.1 08/19/2013    ALBUMIN 4.7 05/05/2023    ALKPHOS 70 05/05/2023    LDL 44 02/15/2023    HDL 34 (L) 02/15/2023    TRIG 277 (H) 02/15/2023    TSH 1.56 02/15/2023    TSH 2.59 02/10/2022    TSH 2.11 02/10/2021     Lab Results   Component Value Date    MICROL 9 09/23/2022    MICROL 8 11/10/2021    MICROL 8 10/14/2020    MICROL 12 01/24/2020    MICROL <5 12/24/2018     Lab Results   Component Value Date    A1C 5.7 (H) 02/15/2023    A1C 6.4 (H) 09/23/2022    A1C 6.3 (H) 02/10/2022    A1C 6.0 (H) 08/19/2021    A1C 6.5 (H) 02/10/2021       Lab Results   Component Value Date    HGB 15.3 05/05/2023           Answers for HPI/ROS submitted by the patient on 5/29/2023  General Symptoms: No  Skin Symptoms: No  HENT Symptoms:  Yes  EYE SYMPTOMS: No  HEART SYMPTOMS: Yes  LUNG SYMPTOMS: No  INTESTINAL SYMPTOMS: Yes  URINARY SYMPTOMS: No  REPRODUCTIVE SYMPTOMS: Yes  SKELETAL SYMPTOMS: Yes  BLOOD SYMPTOMS: No  NERVOUS SYSTEM SYMPTOMS: Yes  MENTAL HEALTH SYMPTOMS: Yes   Voice hoarseness: Yes  Dry mouth: Yes  Low blood pressure: Yes  Light-headedness: Yes  Heart burn or indigestion: Yes  Nausea: Yes  Abdominal pain: Yes  Bloating: Yes  Constipation: Yes  Diarrhea: Yes  Fecal incontinence: Yes  Erectile dysfunction: Yes  Reduced libido: Yes  Back pain: Yes  Muscle aches: Yes  Neck pain: Yes  Joint pain: Yes  Joint stiffness: Yes  Memory loss: Yes  Headache: Yes  Nervous or Anxious: Yes  Depression: Yes  Trouble thinking or concentrating: Yes

## 2023-06-05 NOTE — PATIENT INSTRUCTIONS
[facility]-Department of Endocrinology  Diabetes Educators:   Carli Rajput, RN and Stacey Laguna RN  Clinic Nurse: ESTEFANI Art  CMA's: Santiago Gutierrez and Radha     Scheduling/Clinic phone number : [phone]   Clinic Fax: [phone]  On-Call Endocrine at the [place] (after hours/weekends): [phone] option 4    Please call the number below to schedule your labs.    [facility] [phone]   [facility] [phone]   [place] [phone]   [facility]  [phone]   [facility] [phone]   [place] [phone]   [facility] [place]) [phone]   [facility]   [place] [phone]   [place] [phone]   [place] [phone]   [place] [phone]     Please reach out to the following centers to schedule your imaging appointment:       Imaging (DEXA, CT, MRI, XRAY)    [facility] ([facility] [place] [place]) [phone]   [facility] ([place], [place]) [phone]   [facility] ([facility]) [phone]   [facility] ([facility]) [phone]     Appointment Reminders:  * Please bring meter with for staff to download  * If you are due ONLY for an A1C, it is scheduled with the nurse and will be done in clinic. You do not need to schedule a lab appointment. Fasting is not required for an A1C.  * Refill request should be submitted to your pharmacy. They will contact clinic for approval.

## 2023-06-08 ENCOUNTER — MYC REFILL (OUTPATIENT)
Dept: FAMILY MEDICINE | Facility: CLINIC | Age: 48
End: 2023-06-08

## 2023-06-08 ENCOUNTER — OFFICE VISIT (OUTPATIENT)
Dept: PALLIATIVE MEDICINE | Facility: OTHER | Age: 48
End: 2023-06-08
Payer: MEDICARE

## 2023-06-08 ENCOUNTER — OFFICE VISIT (OUTPATIENT)
Dept: PULMONOLOGY | Facility: CLINIC | Age: 48
End: 2023-06-08
Payer: MEDICARE

## 2023-06-08 VITALS
WEIGHT: 291 LBS | DIASTOLIC BLOOD PRESSURE: 81 MMHG | HEART RATE: 72 BPM | BODY MASS INDEX: 34.36 KG/M2 | OXYGEN SATURATION: 97 % | SYSTOLIC BLOOD PRESSURE: 119 MMHG | RESPIRATION RATE: 14 BRPM | HEIGHT: 77 IN

## 2023-06-08 VITALS — DIASTOLIC BLOOD PRESSURE: 84 MMHG | HEART RATE: 71 BPM | SYSTOLIC BLOOD PRESSURE: 127 MMHG | OXYGEN SATURATION: 95 %

## 2023-06-08 DIAGNOSIS — F32.A ANXIETY AND DEPRESSION: ICD-10-CM

## 2023-06-08 DIAGNOSIS — F41.9 ANXIETY AND DEPRESSION: ICD-10-CM

## 2023-06-08 DIAGNOSIS — M47.816 SPONDYLOSIS OF LUMBAR REGION WITHOUT MYELOPATHY OR RADICULOPATHY: ICD-10-CM

## 2023-06-08 DIAGNOSIS — M48.02 CERVICAL STENOSIS OF SPINAL CANAL: ICD-10-CM

## 2023-06-08 DIAGNOSIS — J45.30 MILD PERSISTENT ASTHMA, UNSPECIFIED WHETHER COMPLICATED: Primary | ICD-10-CM

## 2023-06-08 DIAGNOSIS — Z79.891 LONG TERM (CURRENT) USE OF OPIATE ANALGESIC: ICD-10-CM

## 2023-06-08 DIAGNOSIS — G89.29 CHRONIC INTRACTABLE PAIN: Primary | ICD-10-CM

## 2023-06-08 DIAGNOSIS — M51.369 DDD (DEGENERATIVE DISC DISEASE), LUMBAR: ICD-10-CM

## 2023-06-08 DIAGNOSIS — M45.8 ANKYLOSING SPONDYLITIS OF SACRAL REGION (H): ICD-10-CM

## 2023-06-08 DIAGNOSIS — R49.0 VOICE HOARSENESS: ICD-10-CM

## 2023-06-08 DIAGNOSIS — E08.42 DIABETIC POLYNEUROPATHY ASSOCIATED WITH DIABETES MELLITUS DUE TO UNDERLYING CONDITION (H): ICD-10-CM

## 2023-06-08 LAB
CREAT UR-MCNC: 30 MG/DL
ETHANOL UR QL SCN: NORMAL

## 2023-06-08 PROCEDURE — 80360 METHYLPHENIDATE: CPT | Performed by: NURSE PRACTITIONER

## 2023-06-08 PROCEDURE — 80320 DRUG SCREEN QUANTALCOHOLS: CPT | Performed by: NURSE PRACTITIONER

## 2023-06-08 PROCEDURE — 99214 OFFICE O/P EST MOD 30 MIN: CPT | Performed by: NURSE PRACTITIONER

## 2023-06-08 PROCEDURE — 99215 OFFICE O/P EST HI 40 MIN: CPT | Performed by: INTERNAL MEDICINE

## 2023-06-08 PROCEDURE — G0463 HOSPITAL OUTPT CLINIC VISIT: HCPCS

## 2023-06-08 PROCEDURE — 80363 OPIOIDS & OPIATE ANALOGS 3/4: CPT | Performed by: NURSE PRACTITIONER

## 2023-06-08 PROCEDURE — 80359 METHYLENEDIOXYAMPHETAMINES: CPT | Performed by: NURSE PRACTITIONER

## 2023-06-08 ASSESSMENT — ASTHMA QUESTIONNAIRES
ACT_TOTALSCORE: 18
QUESTION_3 LAST FOUR WEEKS HOW OFTEN DID YOUR ASTHMA SYMPTOMS (WHEEZING, COUGHING, SHORTNESS OF BREATH, CHEST TIGHTNESS OR PAIN) WAKE YOU UP AT NIGHT OR EARLIER THAN USUAL IN THE MORNING: NOT AT ALL
ACT_TOTALSCORE: 18
QUESTION_5 LAST FOUR WEEKS HOW WOULD YOU RATE YOUR ASTHMA CONTROL: SOMEWHAT CONTROLLED
QUESTION_2 LAST FOUR WEEKS HOW OFTEN HAVE YOU HAD SHORTNESS OF BREATH: THREE TO SIX TIMES A WEEK
ACUTE_EXACERBATION_TODAY: NO
QUESTION_1 LAST FOUR WEEKS HOW MUCH OF THE TIME DID YOUR ASTHMA KEEP YOU FROM GETTING AS MUCH DONE AT WORK, SCHOOL OR AT HOME: A LITTLE OF THE TIME
QUESTION_4 LAST FOUR WEEKS HOW OFTEN HAVE YOU USED YOUR RESCUE INHALER OR NEBULIZER MEDICATION (SUCH AS ALBUTEROL): TWO OR THREE TIMES PER WEEK

## 2023-06-08 ASSESSMENT — PAIN SCALES - GENERAL
PAINLEVEL: MODERATE PAIN (4)
PAINLEVEL: MODERATE PAIN (4)

## 2023-06-08 NOTE — LETTER
Opioid / Opioid Plus Controlled Substance Agreement    This is an agreement between you and your provider about the safe and appropriate use of controlled substance/opioids prescribed by your care team. Controlled substances are medicines that can cause physical and mental dependence (abuse).    There are strict laws about having and using these medicines. We here at [facility] are committing to working with you in your efforts to get better. To support you in this work, we ll help you schedule regular office appointments for medicine refills. If we must cancel or change your appointment for any reason, we ll make sure you have enough medicine to last until your next appointment.     As a Provider, I will:    Listen carefully to your concerns and treat you with respect.     Recommend a treatment plan that I believe is in your best interest. This plan may involve therapies other than opioid pain medication.     Talk with you often about the possible benefits, and the risk of harm of any medicine that we prescribe for you.     Provide a plan on how to taper (discontinue or go off) using this medicine if the decision is made to stop its use.    As a Patient, I understand that opioid(s):     Are a controlled substance prescribed by my care team to help me function or work and manage my condition(s).     Are strong medicines and can cause serious side effects such as:    Drowsiness, which can seriously affect my driving ability    A lower breathing rate, enough to cause death    Harm to my thinking ability     Depression     Abuse of and addiction to this medicine    Need to be taken exactly as prescribed. Combining opioids with certain medicines or chemicals (such as illegal drugs, sedatives, sleeping pills, and benzodiazepines) can be dangerous or even fatal. If I stop opioids suddenly, I may have severe withdrawal symptoms.    Do not work for all types of pain nor for all patients. If they re not helpful, I may  be asked to stop them.    {Benzo / Stimulant (Optional):874390}    The risks, benefits and side effects of these medicine(s) were explained to me. I agree that:  1. I will take part in other treatments as advised by my care team. This may be psychiatry or counseling, physical therapy, behavioral therapy, group treatment or a referral to a specialist.     2. I will keep all my appointments. I understand that this is part of the monitoring of opioids. My care team may require an office visit for EVERY opioid/controlled substance refill. If I miss appointments or don t follow instructions, my care team may stop my medicine.    3. I will take my medicines as prescribed. I will not change the dose or schedule unless my care team tells me to. There will be no refills if I run out early.     4. I may be asked to come to the clinic and complete a urine drug test or complete a pill count at any time. If I don t give a urine sample or participate in a pill count, the care team may stop my medicine.    5. I will only receive prescriptions from this clinic for chronic pain. If I am treated by another provider for acute pain issues, I will tell them that I am taking opioid pain medication for chronic pain and that I have a treatment agreement with this provider. I will inform my [facility] care team within one business day if I am given a prescription for any pain medication by another healthcare provider. My [facility] care team can contact other providers and pharmacists about my use of any medicines.    6. It is up to me to make sure that I don t run out of my medicines on weekends or holidays. If my care team is willing to refill my opioid prescription without a visit, I must request refills only during office hours. Refills may take up to 3 business days to process. I will use one pharmacy to fill all my opioid and other controlled substance prescriptions. I will notify the clinic about any changes to my  insurance or medication availability.    7. I am responsible for my prescriptions. If the medicine/prescription is lost, stolen or destroyed, it will not be replaced. I also agree not to share controlled substance medicines with anyone.    8. I am aware I should not use any illegal or recreational drugs. I agree not to drink alcohol unless my care team says I can.       9. If I enroll in the [place] Medical Cannabis program, I will tell my care team prior to my next refill.     10. I will tell my care team right away if I become pregnant, have a new medical problem treated outside of my regular clinic, or have a change in my medications.    11. I understand that this medicine can affect my thinking, judgment and reaction time. Alcohol and drugs affect the brain and body, which can affect the safety of my driving. Being under the influence of alcohol or drugs can affect my decision-making, behaviors, personal safety, and the safety of others. Driving while impaired (DWI) can occur if a person is driving, operating, or in physical control of a car, motorcycle, boat, snowmobile, ATV, motorbike, off-road vehicle, or any other motor vehicle ([place] Statute 169A.20). I understand the risk if I choose to drive or operate any vehicle or machinery.    I understand that if I do not follow any of the conditions above, my prescriptions or treatment may be stopped or changed.          Opioids  What You Need to Know    What are opioids?   Opioids are pain medicines that must be prescribed by a doctor. They are also known as narcotics.     Examples are:   1. morphine (MS Contin, Lindy)  2. oxycodone (Oxycontin)  3. oxycodone and acetaminophen (Percocet)  4. hydrocodone and acetaminophen (Vicodin, Norco)   5. fentanyl patch (Duragesic)   6. hydromorphone (Dilaudid)   7. methadone  8. codeine (Tylenol #3)     What do opioids do well?   Opioids are best for severe short-term pain such as after a surgery or injury. They may work well  for cancer pain. They may help some people with long-lasting (chronic) pain.     What do opioids NOT do well?   Opioids never get rid of pain entirely, and they don t work well for most patients with chronic pain. Opioids don t reduce swelling, one of the causes of pain.                                    Other ways to manage chronic pain and improve function include:       Treat the health problem that may be causing pain    Anti-inflammation medicines, which reduce swelling and tenderness, such as ibuprofen (Advil, Motrin) or naproxen (Aleve)    Acetaminophen (Tylenol)    Antidepressants and anti-seizure medicines, especially for nerve pain    Topical treatments such as patches or creams    Injections or nerve blocks    Chiropractic or osteopathic treatment    Acupuncture, massage, deep breathing, meditation, visual imagery, aromatherapy    Use heat or ice at the pain site    Physical therapy     Exercise    Stop smoking    Take part in therapy       Risks and side effects     Talk to your doctor before you start or decide to keep taking opioids. Possible side effects include:      Lowering your breathing rate enough to cause death    Overdose, including death, especially if taking higher than prescribed doses    Worse depression symptoms; less pleasure in things you usually enjoy    Feeling tired or sluggish    Slower thoughts or cloudy thinking    Being more sensitive to pain over time; pain is harder to control    Trouble sleeping or restless sleep    Changes in hormone levels (for example, less testosterone)    Changes in sex drive or ability to have sex    Constipation    Unsafe driving    Itching and sweating    Dizziness    Nausea, throwing up and dry mouth    What else should I know about opioids?    Opioids may lead to dependence, tolerance, or addiction.      Dependence means that if you stop or reduce the medicine too quickly, you will have withdrawal symptoms. These include loose poop (diarrhea),  jitters, flu-like symptoms, nervousness and tremors. Dependence is not the same as addiction.                       Tolerance means needing higher doses over time to get the same effect. This may increase the chance of serious side effects.      Addiction is when people improperly use a substance that harms their body, their mind or their relations with others. Use of opiates can cause a relapse of addiction if you have a history of drug or alcohol abuse.      People who have used opioids for a long time may have a lower quality of life, worse depression, higher levels of pain and more visits to doctors.    You can overdose on opioids. Take these steps to lower your risk of overdose:    1. Recognize the signs:  Signs of overdose include decrease or loss of consciousness (blackout), slowed breathing, trouble waking up and blue lips. If someone is worried about overdose, they should call 911.    2. Talk to your doctor about Narcan (naloxone).   If you are at risk for overdose, you may be given a prescription for Narcan. This medicine very quickly reverses the effects of opioids.   If you overdose, a friend or family member can give you Narcan while waiting for the ambulance. They need to know the signs of overdose and how to give Narcan.     3. Don't use alcohol or street drugs.   Taking them with opioids can cause death.    4. Do not take any of these medicines unless your doctor says it s OK. Taking these with opioids can cause death:    Benzodiazepines, such as lorazepam (Ativan), alprazolam (Xanax) or diazepam (Valium)    Muscle relaxers, such as cyclobenzaprine (Flexeril)    Sleeping pills like zolpidem (Ambien)     Other opioids      How to keep you and other people safe while taking opioids:    1. Never share your opioids with others.  Opioid medicines are regulated by the Drug Enforcement Agency (BAM). Selling or sharing medications is a criminal act.    2. Be sure to store opioids in a secure place, locked up  if possible. Young children can easily swallow them and overdose.    3. When you are traveling with your medicines, keep them in the original bottles. If you use a pill box, be sure you also carry a copy of your medicine list from your clinic or pharmacy.    4. Safe disposal of opioids    Most pharmacies have places to get rid of medicine, called disposal kiosks. Medicine disposal options are also available in every [facility]. Search your county and  medication disposal  to find more options. You can find more details at:  [email or web address]://[email or web address]-[email or web address]     I agree that my provider, clinic care team, and pharmacy may work with any city, state or federal law enforcement agency that investigates the misuse, sale, or other diversion of my controlled medicine. I will allow my provider to discuss my care with, or share a copy of, this agreement with any other treating provider, pharmacy or emergency room where I receive care.    I have read this agreement and have asked questions about anything I did not understand.    _______________________________________________________  Patient Signature - Joel Pineda _____________________                   Date     _______________________________________________________  Provider Signature - JOCELYN Ron CNP   _____________________                   Date     _______________________________________________________  Witness Signature (required if provider not present while patient signing)   _____________________                   Date

## 2023-06-08 NOTE — PROGRESS NOTES
[facility]    CHIEF COMPLAINT: Chronic Pain.    INTERVAL HISTORY:  Last seen on 4/18/23.       Recommendations/plan at the last visit included:  1. 30 minute Clinic follow-up with JOCELYN Zavala NP-C in July .  Ok to schedule up to three follow up appts at a time, alternating clinic and virtual .   2. Procedures: Trigger points ordered, we will call you to schedule this appt.   3. Medication Management :   1. Narcan refilled   2. Methocarbamol refilled, call your pharmacy when you need this one.     Since last visit:   - Continues to have variable chronic pain from multiple sources.     Pain Information today: Moderate Pain (4)/10. Location of pain: multiple pain sources    Annual Requirements last collected:  N/A     Current Pain Relevant Medications:    Acetaminophen 500 mg BID & with Oxycodone.   Cymbalta 120 mg in AM  Lyrica 150 mg BID  Methocarbamol 500 mg 1-2 QID PRN  Nabumetone 500 mg 1-2 times a day  Lidocaine gel topically 2-3 times daily     Pain Related Controlled Substances:   Clonazepam 0.5 mg at HS. Occasionally 1 tab during the day.  Lunesta 3 mg  Oxycodone 5 mg 1-2 tabs per day PRN              Total opiate dose: 7.5-15 MME     Previous Pain Relevant Medications: (H--helped; HI--Helped initially; [facility]--Somewhat helpful; [facility]--No help; W--worse; SE--side effects; ?--Unsure if helpful)   NOTE: This medication information taken from patient's intake form, not medical records.   Opiates: Oxycodone: H, Tramadol:[facility]. [facility], Codeine: [place]  NSAIDS: Celebrex: [facility], Nabumetone:H, Naproxen: [facility]  Anti-migraine medications: Midrin? , Maxalt:H  Muscle Relaxants: Baclofen:[facility], Flexeril:H, Tizaidine:?, Methocarbamol:?  Neuropathics: Lyrica:H  Anti-depressants: Abilify:[facility], Brintellix: [place], Wellbutrin: ?, Cymbalta: [place], Nortriptyline: [place], Seroquel:   [facility], Risperdal: [place], Effexor:?, Cymbalta ?  Anxiety medications: Xanax: H, Klonpin: H, lorazepam: H      Topicals: Lidocaine:H  Sleep  medications:Belsomra: [place], Melatonin:H, Trazodone [place], Ambien:[place]  Other medications not covered above: Humira: All, Enbrel; H, dicyclomine:H, Medrol:[facility], Prednisone:H     Any illicit drug use: denies   EtOH use: none   Caffeine use: 6-8 per day   Nicotine use: None     Past Pain Treatments:   Pain Clinic:   Yes  [facility] pain management: For spinal injections with Dr Diaz, [facility]: injections, nerve ablation, [facility] for SCS treatment.  Did trial but did not find it beneficial.   [facility] chronic pain program.    PT: Yes  For other reasons. Neck, back and feet  Psychologist: Yes ongoing with private therapist[facility]at  [facility].   Chiropractor: Yes years ag              Acupuncture: Yes [facility]  Pharmacotherapy:  Opioids: Yes  Non-opioids:    Yes   TENs Unit:Yes H for back   Injections: Yes Many for neck and back     Surgeries related to pain: Yes   October 2007 L5-S1 laminectomy, micro discectomy          THE 4 As OF OPIOID MAINTENANCE ANALGESIA    Analgesia: Is pain relief clinically significant? YES   Activity: Is patient functional and able to perform Activities of Daily Living? YES   Adverse effects: Is patient free from adverse side effects from opiates? YES   Adherence to Rx protocol: Is patient adhering to Controlled Substance Agreement and taking medications ONLY as ordered? YES     Is Narcan prescribed for opiate use >50 MME daily or concurrent use of opiates and benzodiazepines?  Yes prescribed by this writer 8/10/2020    [place] Board of Pharmacy Data Base Reviewed:    YES; No concern for abuse or misuse of controlled medications based on this report. Reviewed [facility] June 7, 2023- no concerning fills.      /84   Pulse 71   SpO2 95%      PHYSICAL EXAM Deferred at this appointment     DIRE Score for ongoing opioid management is calculated as follows:    Diagnosis = 3 pts (advanced condition; severe pain/objective findings)    Intractability = 3 pts (patient fully engaged but inadequate response to  treatments)    Risk        Psych = 3 pts (no significant personality dysfunction/mental illness; good communication with clinic)         Chem Hlth = 3 pts (no history of chemical dependency; not drug-focused)       Reliability = 2 pts (occasional difficulties with compliance; generally reliable)       Social = 2 pts (reduction in some relationships/life rolls)       (Psych + Chem hlth + Reliability + Social) = 16    Efficacy = 2 pts (moderate benefit/function; low med dose; too early/not tried meds)    DIRE Score = 18        7-13: likely NOT suitable candidate for long-term opioid analgesia       14-21: may be a suitable candidate for long-term opioid analgesia     Previous Diagnostic Tests:   Imaging Studies:   MR LUMBAR SPINE W/O CONTRAST 6/27/2019  Impression:   1. Multilevel lumbar spondylosis, most pronounced at L5-S1 with  moderate to severe left and moderate right neural foraminal stenosis as well as narrowing of the left lateral recess and abutment the traversing left S1 nerve root.   2. Additional multilevel degenerative changes of the lumbar spine as described above.     PAIN RELEVANT CONDITIONS:   1.  Postherpetic neuralgia  2.  Ankylosing spondylosis, Lumbar DDD with left S1 nerve involvement, lumbar stenosis  3.  Chronic migraine headaches  4.  History: Complex medical issues, see history       DIAGNOSIS AND PLAN:     (G89.29) Chronic intractable pain  (primary encounter diagnosis)  (E08.42) Diabetic polyneuropathy associated with diabetes mellitus due to underlying condition (H)  (M47.816) Spondylosis of lumbar region without myelopathy or radiculopathy  (M48.02) Cervical stenosis of spinal canal  (F41.9,  F32.A) Anxiety and depression  Comment: Tito presents for a check in appt. He requests that Pain Center assume management of pain related medications. He also requests to be referred to pain psychologist, Shameka Cuenca. Will continue current plan of care.   Plan: PAIN PHD EVAL/TREAT/FOLLOW  UP      (Z79.891) Long term (current) use of opiate analgesic  Comment: Required for consideration of assuming prescribing of controlled substance.   Plan: Drug Confirmation Panel Urine with Creat,         Ethanol urine           PATIENT INSTRUCTIONS:     Diagnosis reviewed, treatment option addressed, and risk/benefits discussed.  Self-care instructions given.  I am recommending a multidisciplinary treatment plan to help this patient better manage pain.    Remember to request ALL medication refills 5 BUSINESS days before you run out.     1. 60 minutes Clinic follow-up with JOECLYN Zavala NP-C in July or expand August appt to 60 min  2. Labs: Urine drug screen & controlled substance agreement   3. Medication Management :   1. Continue with Dr Lyles for pain related medications until our next appt.     I have reviewed the note as documented above.  This accurately captures the substance of my conversation with the patient.  A total of 20 minutes of preparation, care, and consultation were spent on this visit today.     JOCELYN Padgett NPLeydaC  [facility] [place] [facility]    (Information in italics and blue color are taken from previous pain and consulting medical providers notes and are documented as such)

## 2023-06-08 NOTE — PROGRESS NOTES
"Joel Pineda's goals for this visit include: Return  He requests these members of his care team be copied on today's visit information: PCP    PCP: Ksenia Lyles    Referring Provider:  No referring provider defined for this encounter.    /81   Pulse 72   Resp 14   Ht 1.956 m (6' 5.01\")   Wt 132 kg (291 lb)   SpO2 97%   BMI 34.50 kg/m      Do you need any medication refills at today's visit? N    Slime Szymanski LPN  Pulmonary Medicine:  [facility]  Phone: [phone] Fax: [phone]      "

## 2023-06-08 NOTE — PROGRESS NOTES
Pulmonary Clinic Return Patient Visit  Reason for Visit: Asthma  History of Present Illness  Joel Pineda is a pleasant 46-year-old male with a history of ankylosing spondylitis on Humira, acid reflux, allergies, and asthma who presents to pulmonary clinic today for further follow-up of asthma. I last saw him in clinic in 6/2022.   To briefly review, history of childhood asthma had been decently well controlled on high dose Advair, Singulair, and albuterol as needed and pulmonary function has historically been normal.  Previous attempts to de-escalate asthma therapy yielded nearly immediate return to symptoms and he was left on the same regimen for the last several years.  Hx of pulmonary embolism in early 40s - not on any blood thinners now. Not sure if it is provoked and was treated with warfarin for 6 months. When I saw him in clinic, asthma control was excellent with an ACT score of 21 and I de-escalated his regimen to intermediate dose Fluticasone/salmaterol 250/50 mcg.  Today, asthma is out of control due to intolerance to ICS and he has had to use only albuterol and Singular. Advair and Symbicort appeared to worsen his voice hoarseness. He is more SOB and appears to use his albuterol more frequently. There has been no flares since the last visit. 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  He has lost considerable weight since starting Ozempic after referral to the weight management clinic.   No flares recently on steroid burst treatments.   He also has GERD which is fairly controlled on Prilosec 20 mg daily. He also has LLOYD and uses his CPAP regularly.   Never smoker. 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    Review of Systems:  10 of 14 systems reviewed and are negative unless otherwise stated in HPI.    Past Medical History:   Diagnosis Date      Acne      Acquired hypothyroidism      Allergic state      Ankylosing spondylitis lumbar region (H)      Ankylosing spondylitis of sacral region (H)      Anxiety      Bipolar 2 disorder (H)      Chest pain     Chest pain, regulated w/BP meds. Clear arteries.     Chronic pain      DDD (degenerative disc disease), lumbar      Depressive disorder      Diabetes (H)      Diverticulosis      Facet arthritis of cervical region      Gastroesophageal reflux disease      Hypertension      IBS (irritable bowel syndrome)      Intracranial arachnoid cyst      Major depressive disorder, recurrent episode (H)     Multiple psych providers - they manage meds August 2015: Provigil induced severe mood dis-function      Major depressive disorder, recurrent episode, severe (H) 12/2/2020     MDD (major depressive disorder), recurrent severe, without psychosis (H) 7/21/2021     Mixed dyslipidemia 4/6/2023     LLOYD (obstructive sleep apnea)- mild (AHI 11)      Polyneuropathy      Pulmonary embolism (H)      Skin exam, screening for cancer 12/3/2013     Sleep apnea      Uncomplicated asthma        Past Surgical History:   Procedure Laterality Date     BACK SURGERY  10/2007    lumbar discectomy L5-S1     BIOPSY  09/15/2020    Colon Adenomas x2     COLONOSCOPY      Note: colonoscopy scheduled with [facility] on Friday, 9/4/15     COSMETIC SURGERY  2012    Nose Exterior - functional     GI SURGERY  08/2013    Sigmoidectomy     HERNIA REPAIR, UMBILICAL  08/23/2011    Dr. Evan whiting     INCISION AND DRAINAGE, ABSCESS, COMPLEX  08/23/2011    umbilical, Dr. Evan Beavers     LAPAROSCOPIC ASSISTED COLECTOMY LEFT (DESCENDING)  08/15/2013    Procedure: LAPAROSCOPIC ASSISTED COLECTOMY LEFT (DESCENDING);  Laparoscopic Hand Assisted Sigmoid Resection, Mobilization of Splenic Fissure, coloproctoscopy, *Latex Free Room* Anesthesia General with Pain block  ;  Surgeon: Aurora Justice MD;  Location: [facility] OR     NERVE SURGERY  08/18/2011    RF ablation  "@ L3-S1 @ [facility]     RECONSTRUCT NOSE AND SEPTUM (FUNCTIONAL)  10/14/2011    Procedure:RECONSTRUCT NOSE AND SEPTUM (FUNCTIONAL); Functional Septorhinoplasty, Turbinate Reduction, ; Surgeon:CEDRIC CUEVAS; Location:[facility] OR     SINUS SURGERY  10/01/2001    ethmoidectomy chronic sinusitis     SOFT TISSUE SURGERY  4/7/2022    Hidradenitis Suppurativa surgery       Family History   Problem Relation Age of Onset     Musculoskeletal Disorder Mother         back     Anxiety Disorder Mother      Colon Polyps Mother      Ulcerative Colitis Mother         and ischemic small intestine, surgery     Hypertension Mother      Breast Cancer Mother      Osteoporosis Mother      Diabetes Mother         Type 2, Diagnosed in 2014     Depression Mother         Takes Cymbalta to help with chronic pain + depx     Thyroid Disease Mother         Hypothyroidism     Obesity Mother         Under much better control latter half of 2015     Musculoskeletal Disorder Father         back     Substance Abuse Father         Alcohol     Hypertension Father      Hyperlipidemia Father      Depression Father         Off meds for many years. Seems \"ok\"     Anxiety Disorder Father      Heart Disease Maternal Grandmother      Heart Disease Maternal Grandfather      Psychotic Disorder Paternal Grandfather      Suicide Paternal Grandfather      Depression Paternal Grandfather         Pediatrician. Committed suicide by pistol in 1990.     Musculoskeletal Disorder Brother         back     Depression Brother         Expressed as anger and moodiness     Substance Abuse Brother         Alcohol     Anxiety Disorder Brother      Substance Abuse Sister         Alcohol     Depression Sister         Mental Health Therapist, yet no anti-depressants?     Anxiety Disorder Sister         Mental Health Therapist, yet no anti-anxiety meds?     Other Cancer Other         Bladder     Substance Abuse Brother      Colon Cancer No family hx of      Crohn's Disease No family hx of  "     Anesthesia Reaction No family hx of      Cancer No family hx of         No family history of skin cancer       Social History     Socioeconomic History     Marital status: Single     Spouse name: Not on file     Number of children: Not on file     Years of education: Not on file     Highest education level: Not on file   Occupational History     Occupation:      Employer: [employer]     Occupation: paraprofessional     Comment: [employer]     Employer: [employer]   Social Needs     Financial resource strain: Not on file     Food insecurity     Worry: Not on file     Inability: Not on file     Transportation needs     Medical: Not on file     Non-medical: Not on file   Tobacco Use     Smoking status: Never Smoker     Smokeless tobacco: Never Used   Substance and Sexual Activity     Alcohol use: Not Currently     Alcohol/week: 0.0 standard drinks     Drinks per session: 1 or 2     Binge frequency: Weekly     Comment: occ 1/week     Drug use: No     Sexual activity: Never     Partners: Female     Birth control/protection: None   Lifestyle     Physical activity     Days per week: Not on file     Minutes per session: Not on file     Stress: Not on file   Relationships     Social connections     Talks on phone: Not on file     Gets together: Not on file     Attends Jehovah's witness service: Not on file     Active member of club or organization: Not on file     Attends meetings of clubs or organizations: Not on file     Relationship status: Not on file     Intimate partner violence     Fear of current or ex partner: Not on file     Emotionally abused: Not on file     Physically abused: Not on file     Forced sexual activity: Not on file   Other Topics Concern     Parent/sibling w/ CABG, MI or angioplasty before 65F 55M? No      Service Not Asked     Blood Transfusions Not Asked     Caffeine Concern Not Asked     Occupational Exposure Not Asked     Hobby Hazards Not Asked     Sleep Concern Yes      Stress Concern Yes     Weight Concern Not Asked     Special Diet Not Asked     Back Care Yes     Exercise Not Asked     Bike Helmet Not Asked     Seat Belt Yes     Self-Exams Not Asked   Social History Narrative     Not on file         Allergies   Allergen Reactions     Amoxicillin-Pot Clavulanate Difficulty breathing     Banana Shortness Of Breath     Pt reports organic Banana is okay.      Nitroglycerin Palpitations     Penicillins Anaphylaxis     Provigil [Modafinil] Shortness Of Breath     headache     Gadolinium Hives and Itching     Patient was premedicated for the contrast allergy. He did still have a reaction a few hours after injection. Hives and itching. Dr. Gomez told tech to inform pt he should only have contrast again in the future when premedicated and at a hospital. Not at an outpatient facility.      Influenza Virus Vaccine Rash     Ketoconazole      Topical cream caused swelling and itching     Dye [Contrast Dye] Other (See Comments) and Hives     Moderate flushing, CT contrast     Gabapentin      Other reaction(s): hives     Golimumab      Hives, bradycardia, face swelling     Naproxen      Other reaction(s): Bleeding Gums     Neurontin [Gabapentin] Hives     Moderate hives     Nortriptyline Hives     Nystatin Hives     Varicella Virus Vaccine Live      Rash     Baclofen Other (See Comments)     Cognitive changes     Flagyl [Metronidazole Hcl] Palpitations and Hives     Latex Rash     Metronidazole Palpitations, Other (See Comments) and Rash     dizziness (versus ciprofloxacin taken at same time)         Current Outpatient Medications:      acetaminophen 500 MG CAPS, Take 1,000 mg by mouth 3 times daily, Disp: 60 capsule, Rfl:      albuterol (PROAIR HFA/PROVENTIL HFA/VENTOLIN HFA) 108 (90 Base) MCG/ACT inhaler, Inhale 2 puffs into the lungs every 4 hours as needed for shortness of breath / dyspnea or wheezing, Disp: 8.5 g, Rfl: 11     aspirin (ASA) 81 MG tablet, Take 81 mg by mouth daily ,  "Disp: , Rfl:      B-D LUER-LUCILLE SYRINGE 25G X 1\" 3 ML MISC, USE WITH B12 INJECTIONS, Disp: 12 each, Rfl: 0     buPROPion (WELLBUTRIN XL) 300 MG 24 hr tablet, Take 1 tablet by mouth daily, Disp: , Rfl:      cholecalciferol (D3-50) 1250 mcg (73149 units) capsule, TAKE ONE CAPSULE BY MOUTH EVERY 2 WEEKS., Disp: 24 capsule, Rfl: 0     clindamycin (CLEOCIN T) 1 % external lotion, Apply bid for folliculitis, Disp: 60 mL, Rfl: 6     clonazePAM (KLONOPIN) 0.5 MG tablet, Take 1 mg by mouth At Bedtime For RBD, Disp: , Rfl:      cyanocobalamin (CYANOCOBALAMIN) 1000 MCG/ML injection, INJECT 1 ML INTO THE MUSCLE EVERY 30 DAYS, Disp: 10 mL, Rfl: 0     DULoxetine (CYMBALTA) 60 MG capsule, Take 120 mg by mouth daily , Disp: , Rfl:      EPINEPHrine (ANY BX GENERIC EQUIV) 0.3 MG/0.3ML injection 2-pack, Inject 0.3 mLs (0.3 mg) into the muscle once as needed for anaphylaxis, Disp: 0.6 mL, Rfl: 3     eszopiclone (LUNESTA) 3 MG tablet, Take 3 mg by mouth At Bedtime , Disp: , Rfl:      etanercept (ENBREL SURECLICK) 50 MG/ML autoinjector, Inject 50 mg Subcutaneous once a week . Hold for signs of infection, and seek medical attention., Disp: 4 mL, Rfl: 7     famotidine (PEPCID) 20 MG tablet, Prior to administration of Humira every 2 weeks (Patient taking differently: Take 20 mg by mouth every 7 days Prior to Enbrel Injections to prevent skin reaction), Disp: , Rfl:      fenofibrate (TRICOR) 48 MG tablet, Take 1 tablet (48 mg) by mouth daily, Disp: 90 tablet, Rfl: 1     fluticasone-salmeterol (ADVAIR-HFA) 45-21 MCG/ACT inhaler, Inhale 2 puffs into the lungs 2 times daily, Disp: 12 g, Rfl: 11     levothyroxine (SYNTHROID/LEVOTHROID) 75 MCG tablet, TAKE ONE TABLET BY MOUTH EVERY MORNING, Disp: 90 tablet, Rfl: 2     lidocaine (XYLOCAINE) 5 % external ointment, APPLY TOPICALLY 4 TIMES DAILY AS NEEDED FOR PAIN, Disp: 50 g, Rfl: 1     lidocaine-prilocaine (EMLA) 2.5-2.5 % external cream, Apply topically as needed for moderate pain, Disp: 30 g, Rfl: " 3     melatonin 3 MG tablet, Take 13 mg by mouth nightly as needed , Disp: , Rfl:      methocarbamol (ROBAXIN) 500 MG tablet, TAKE 1 - 2 TABLETS BY MOUTH 4 TIMES DAILY AS NEEDED FOR MUSCLE SPASMS, Disp: 240 tablet, Rfl: 1     metoclopramide (REGLAN) 5 MG tablet, Take 5 mg by mouth 4 times daily as needed, Disp: , Rfl:      metoprolol succinate ER (TOPROL XL) 200 MG 24 hr tablet, Take 1 tablet (200 mg) by mouth 2 times daily, Disp: 180 tablet, Rfl: 1     montelukast (SINGULAIR) 10 MG tablet, TAKE ONE TABLET BY MOUTH EVERY EVENING, Disp: 90 tablet, Rfl: 3     nabumetone (RELAFEN) 500 MG tablet, TAKE 1-2 TABLETS BY MOUTH TWICE DAILY AS NEEDED WITH FOOD FOR MODERATE PAIN, Disp: 120 tablet, Rfl: 5     naloxone (NARCAN) 4 MG/0.1ML nasal spray, Spray 1 spray (4 mg) into one nostril alternating nostrils once as needed for opioid reversal every 2-3 minutes until assistance arrives, Disp: 0.2 mL, Rfl: 0     olopatadine (PATADAY) 0.2 % ophthalmic solution, Place 1 drop into both eyes daily, Disp: 2.5 mL, Rfl: 3     omega-3 acid ethyl esters (LOVAZA) 1 g capsule, TAKE TWO CAPSULES BY MOUTH TWICE DAILY, Disp: 360 capsule, Rfl: 0     omeprazole (PRILOSEC) 20 MG DR capsule, Take 20 mg by mouth as needed, Disp: , Rfl:      order for DME, Equipment being ordered: lumbosacral belt/brace, Disp: 1 Units, Rfl: 0     order for DME, Respironics REMSTAR 60 Series Auto CPAP 9-13 cm H2O, Wisp nasal mask w/a large cushion and a chinstrap, Disp: , Rfl:      oxyCODONE (ROXICODONE) 5 MG tablet, Take 1 tablet (5 mg) by mouth every 6 hours as needed for pain (maximum 6 tablet(s) per day), Disp: 35 tablet, Rfl: 0     pregabalin (LYRICA) 150 MG capsule, TAKE ONE CAPSULE BY MOUTH THREE TIMES DAILY, Disp: 270 capsule, Rfl: 0     pseudoePHEDrine-guaiFENesin (MUCINEX D)  MG 12 hr tablet, Take 1 tablet by mouth every 12 hours, Disp: , Rfl:      pyridostigmine (MESTINON) 60 MG tablet, Take 1 tablet by mouth 3 times daily, Disp: , Rfl:      ramipril  "(ALTACE) 10 MG capsule, TAKE ONE CAPSULE BY MOUTH ONCE DAILY, Disp: 90 capsule, Rfl: 1     risperiDONE (RISPERDAL) 0.5 MG tablet, Take 0.5 mg by mouth 2 times daily as needed, Disp: , Rfl:      rizatriptan (MAXALT) 10 MG tablet, Take 1 tablet (10 mg) by mouth at onset of headache for migraine May repeat in 2 hours. Max 3 tablets/24 hours., Disp: 30 tablet, Rfl: 1     rosuvastatin (CRESTOR) 40 MG tablet, Take 1 tablet (40 mg) by mouth daily, Disp: 90 tablet, Rfl: 1     Semaglutide, 1 MG/DOSE, 4 MG/3ML SOPN, Inject 1 mg Subcutaneous once a week, Disp: 3 mL, Rfl: 11     sodium fluoride 1.1 % CREA, At Bedtime, Disp: , Rfl:      spacer (OPTICHAMBER JOHNNY) holding chamber, To be used in conjunction with albuterol (PROAIR HFA/PROVENTIL HFA/VENTOLIN HFA) 108 (90 Base) MCG/ACT inhaler, Disp: 1 each, Rfl: 0     triamcinolone (KENALOG) 0.1 % external cream, APPLY TOPICALLY TWICE DAILY AS NEEDED FOR IRRITATION, Disp: 30 g, Rfl: 0     valACYclovir (VALTREX) 500 MG tablet, TAKE ONE TABLET BY MOUTH ONCE DAILY, Disp: 90 tablet, Rfl: 2     vitamin B complex with vitamin C (STRESS TAB) tablet, Take 1 tablet by mouth daily, Disp: , Rfl:      ZINC SULFATE-VITAMIN C MT, Take 1 tablet by mouth daily, Disp: , Rfl:       Physical Exam:  /81   Pulse 72   Resp 14   Ht 1.956 m (6' 5.01\")   Wt 132 kg (291 lb)   SpO2 97%   BMI 34.50 kg/m    GENERAL: Well developed, well nourished, alert, and in no apparent distress.  HEENT: Normocephalic, atraumatic. PERRL, EOMI. Oral mucosa is moist. No perioral cyanosis.  NECK: supple, no masses, no thyromegaly.  RESP:  Normal respiratory effort.  CTAB.  No rales, wheezes, rhonchi.  No cyanosis or clubbing.  CV: Normal S1, S2, regular rhythm, normal rate. No murmur.  No LE edema.   ABDOMEN:  Soft, non-tender, non-distended.   SKIN: warm and dry. No rash.  NEURO: AAOx3.  Normal gait.  Fluent speech.  PSYCH: mentation appears normal.       Results:  PFTs: Normal lung volumes, flows, volume loops " and preserved diffusion.  Most Recent [facility] Pulmonary Function Testing    FVC-Pred   Date Value Ref Range Status   06/02/2021 6.19 L      FVC-Pre   Date Value Ref Range Status   06/02/2021 5.29 L      FVC-%Pred-Pre   Date Value Ref Range Status   06/02/2021 85 %      FEV1-Pre   Date Value Ref Range Status   06/02/2021 4.38 L      FEV1-%Pred-Pre   Date Value Ref Range Status   06/02/2021 90 %      FEV1FVC-Pred   Date Value Ref Range Status   06/02/2021 79 %      FEV1FVC-Pre   Date Value Ref Range Status   06/02/2021 83 %      No results found for: 20029  FEFMax-Pred   Date Value Ref Range Status   06/02/2021 11.37 L/sec      FEFMax-Pre   Date Value Ref Range Status   06/02/2021 11.97 L/sec      FEFMax-%Pred-Pre   Date Value Ref Range Status   06/02/2021 105 %      ExpTime-Pre   Date Value Ref Range Status   06/02/2021 6.90 sec      FIFMax-Pre   Date Value Ref Range Status   06/02/2021 9.58 L/sec      FEV1FEV6-Pred   Date Value Ref Range Status   06/02/2021 81 %      FEV1FEV6-Pre   Date Value Ref Range Status   06/02/2021 83 %      No results found for: 20055  Imaging (personally reviewed in clinic today): None      Assessment and Plan:   Moderate Persistent Asthma  ACT score is today is 18 and he has not been using any of the previously prescribed ICS due to voice hoarseness. He is dependent on albuterol. I will start him on Stiolto due to ICS intolerance and prescriptions were given today. He will continue with Montelukast and albuterol as needed. We discussed trigger avoidance and an asthma action plan was discussed in clinic today. He has not done well with attempts to de-escalate his regimen and so I will not make any changes.  Tito was educated on the fact that obesity, GERD and LLOYD can make asthma control more difficult and he knows the importance of weight loss, GERD control (now on Prilosec daily) and compliance with his CPAP for LLOYD control. He may benefit from increasing the dose of his Prilosec, he  knows to increase to 40 mg if he needs to.  Voice hoarseness  Refer to ENT    Obesity/LLOYD  He has lost considerable weight since starting Ozempic after referral to the weight management clinic.     Ankylosing spondylosis on Enbrel  No evidence of lung restriction of PFTs      Questions and concerns were answered to the patient's satisfaction.  he was provided with my contact information should new questions or concerns arise in the interim.  He should return to clinic in 12 months  He is up to date on a seasonal influenza vaccine, Prevnar (2015) and Pneumovax (2016).    I spent a total of 40 minutes face to face with Joel Pineda during today's office visit. Over 50% of this time was spent counseling the patient and/or coordinating care regarding their pulmonary disease.    Sue Gunter MD  Pulmonary, Critical Care and Sleep Medicine  [facility]  Pager: [phone]    The above note was dictated using voice recognition software and may include typographical errors. Please contact the author for any clarifications.

## 2023-06-08 NOTE — PATIENT INSTRUCTIONS
After Visit Instructions:     Thank you for coming to [facility] for your care. It is my goal to partner with you to help you reach your optimal state of health.   Continue daily self-care, identifying contributing factors, and monitoring variations in pain level. Continue to integrate self-care into your life.      60 minutes Clinic follow-up with JOCELYN Zavala NP-C in July or expand August appt to 60 min  Labs: Urine drug screen & controlled substance agreement   Medication Management :   Continue with Dr Lyles for pain related medications until our next appt.       JOCELYN Padgett NP-C  [facility]  [facility] - Monday and Friday  [place] ([facility]) - Tuesday  [place] - Thursday    Be sure to request ALL medication refills 5 days prior to the due date unless you will see your medical provider in an appointment before the due date.  This is YOUR responsibility. Do not expect same day refills. If you do not plan ahead you may run out of medications.   NO early refills are allowed. It is your responsibility to manage your medications responsibility and keep them safely stored. Lost or destroyed medications WILL NOT be replaced    Scheduling/Clinic telephone Number for ALL locations:  [phone]    After Hours On-Call Service:  [phone]    Call with any questions about your care and for scheduling assistance.   Calls are returned Monday through Friday between 8 AM and 4:00 PM. We usually get back to you within 2 business days depending on the issue/request.    If we are prescribing your medications:  For opioid medication refills, call the clinic or send a [employer] message 7 days in advance.  Please include:  Your name and date of birth.   Name of requested medication  Name of the pharmacy.  For non-opioid medications, call your pharmacy directly to request a refill. Please allow 3-4 days to be processed.   Per [place] State Law:  All controlled substance  prescriptions must be filled within 30 days of being written.    For those controlled substances allowing refills, pickup must occur within 30 days of last fill.      We believe regular attendance is key to your success in our program!    Any time you are unable to keep your appointment we ask that you call us at least 24 hours in advance to cancel.This will allow us to offer the appointment time to another patient.   Multiple missed appointments may lead to dismissal from the clinic.

## 2023-06-08 NOTE — TELEPHONE ENCOUNTER
[place] outside of this office except clonazpeam and eszopiclone from outside provider. Had appointment with pain clinic today and she [place] but note not yet complete

## 2023-06-08 NOTE — PROGRESS NOTES
Patient presents to the clinic today for a follow up with JOCELYN Ron CNP            1/24/2023     1:34 PM 4/18/2023     1:16 PM 6/8/2023     3:18 PM   PEG Score   PEG Total Score 6.67 4.67 5       UDS/CSA-9/23/22 pcp     Medications- oxyCODONE  pcp     QUESTIONS:    Sneha GUERRIER [facility] [place]

## 2023-06-09 RX ORDER — OXYCODONE HYDROCHLORIDE 5 MG/1
5 TABLET ORAL EVERY 6 HOURS PRN
Qty: 12 TABLET | Refills: 0 | Status: SHIPPED | OUTPATIENT
Start: 2023-06-09 | End: 2023-06-14

## 2023-06-09 ASSESSMENT — ASTHMA QUESTIONNAIRES
ACT_TOTALSCORE: 19
QUESTION_3 LAST FOUR WEEKS HOW OFTEN DID YOUR ASTHMA SYMPTOMS (WHEEZING, COUGHING, SHORTNESS OF BREATH, CHEST TIGHTNESS OR PAIN) WAKE YOU UP AT NIGHT OR EARLIER THAN USUAL IN THE MORNING: NOT AT ALL
QUESTION_2 LAST FOUR WEEKS HOW OFTEN HAVE YOU HAD SHORTNESS OF BREATH: ONCE OR TWICE A WEEK
QUESTION_5 LAST FOUR WEEKS HOW WOULD YOU RATE YOUR ASTHMA CONTROL: SOMEWHAT CONTROLLED
QUESTION_1 LAST FOUR WEEKS HOW MUCH OF THE TIME DID YOUR ASTHMA KEEP YOU FROM GETTING AS MUCH DONE AT WORK, SCHOOL OR AT HOME: A LITTLE OF THE TIME
QUESTION_4 LAST FOUR WEEKS HOW OFTEN HAVE YOU USED YOUR RESCUE INHALER OR NEBULIZER MEDICATION (SUCH AS ALBUTEROL): TWO OR THREE TIMES PER WEEK
ACT_TOTALSCORE: 19

## 2023-06-09 NOTE — TELEPHONE ENCOUNTER
[place] outside of this office.   last filled oxycodone 5/19/23 for 35 tablets  Also on klonopin and pregabalin  Klonopin from outside provider   Has appointment with Dr. Lyles next week   Given 12 tablets

## 2023-06-12 ENCOUNTER — TELEPHONE (OUTPATIENT)
Dept: FAMILY MEDICINE | Facility: CLINIC | Age: 48
End: 2023-06-12
Payer: MEDICARE

## 2023-06-12 LAB — PREGABALIN UR QL CFM: PRESENT

## 2023-06-12 NOTE — TELEPHONE ENCOUNTER
Forms/Letter Request    Type of form/letter: [employer] - Physical Therapy    Have you been seen for this request: N/A    Do we have the form/letter: Yes: Will place form on providers desk for review/signature      When is form/letter needed by: asap    How would you like the form/letter returned: Fax: [phone]

## 2023-06-13 ENCOUNTER — MYC MEDICAL ADVICE (OUTPATIENT)
Dept: PULMONOLOGY | Facility: CLINIC | Age: 48
End: 2023-06-13
Payer: MEDICARE

## 2023-06-13 NOTE — TELEPHONE ENCOUNTER
Faxed form to [phone] and sent to scanning.  Cecilia Cavazos, [facility]    Form originally had wrong pt label / form corrected and then faxed to number above.

## 2023-06-13 NOTE — RESULT ENCOUNTER NOTE
Urine drug testing as expected. No illicit medication or alcohol noted. Continue standard monitoring while on opiates.   Domi BANKS RN CNP, FNP  [facility] [place]

## 2023-06-14 ENCOUNTER — ANCILLARY PROCEDURE (OUTPATIENT)
Dept: GENERAL RADIOLOGY | Facility: CLINIC | Age: 48
End: 2023-06-14
Attending: FAMILY MEDICINE
Payer: MEDICARE

## 2023-06-14 ENCOUNTER — OFFICE VISIT (OUTPATIENT)
Dept: FAMILY MEDICINE | Facility: CLINIC | Age: 48
End: 2023-06-14
Payer: MEDICARE

## 2023-06-14 ENCOUNTER — TRANSFERRED RECORDS (OUTPATIENT)
Dept: HEALTH INFORMATION MANAGEMENT | Facility: CLINIC | Age: 48
End: 2023-06-14

## 2023-06-14 VITALS
TEMPERATURE: 97.5 F | DIASTOLIC BLOOD PRESSURE: 79 MMHG | BODY MASS INDEX: 34.94 KG/M2 | OXYGEN SATURATION: 97 % | HEIGHT: 77 IN | HEART RATE: 84 BPM | WEIGHT: 295.9 LBS | RESPIRATION RATE: 18 BRPM | SYSTOLIC BLOOD PRESSURE: 122 MMHG

## 2023-06-14 DIAGNOSIS — M79.675 PAIN OF LEFT GREAT TOE: ICD-10-CM

## 2023-06-14 DIAGNOSIS — M79.675 PAIN OF LEFT GREAT TOE: Primary | ICD-10-CM

## 2023-06-14 DIAGNOSIS — M51.369 DDD (DEGENERATIVE DISC DISEASE), LUMBAR: ICD-10-CM

## 2023-06-14 DIAGNOSIS — J45.30 MILD PERSISTENT ASTHMA, UNSPECIFIED WHETHER COMPLICATED: ICD-10-CM

## 2023-06-14 DIAGNOSIS — G89.4 CHRONIC PAIN SYNDROME: ICD-10-CM

## 2023-06-14 DIAGNOSIS — M45.8 ANKYLOSING SPONDYLITIS OF SACRAL REGION (H): ICD-10-CM

## 2023-06-14 PROCEDURE — 99214 OFFICE O/P EST MOD 30 MIN: CPT | Performed by: FAMILY MEDICINE

## 2023-06-14 PROCEDURE — 73630 X-RAY EXAM OF FOOT: CPT | Mod: TC | Performed by: RADIOLOGY

## 2023-06-14 RX ORDER — OXYCODONE HYDROCHLORIDE 5 MG/1
5 TABLET ORAL EVERY 6 HOURS PRN
Qty: 35 TABLET | Refills: 0 | Status: SHIPPED | OUTPATIENT
Start: 2023-06-14 | End: 2023-07-09

## 2023-06-14 ASSESSMENT — ANXIETY QUESTIONNAIRES
3. WORRYING TOO MUCH ABOUT DIFFERENT THINGS: MORE THAN HALF THE DAYS
4. TROUBLE RELAXING: SEVERAL DAYS
IF YOU CHECKED OFF ANY PROBLEMS ON THIS QUESTIONNAIRE, HOW DIFFICULT HAVE THESE PROBLEMS MADE IT FOR YOU TO DO YOUR WORK, TAKE CARE OF THINGS AT HOME, OR GET ALONG WITH OTHER PEOPLE: VERY DIFFICULT
8. IF YOU CHECKED OFF ANY PROBLEMS, HOW DIFFICULT HAVE THESE MADE IT FOR YOU TO DO YOUR WORK, TAKE CARE OF THINGS AT HOME, OR GET ALONG WITH OTHER PEOPLE?: VERY DIFFICULT
6. BECOMING EASILY ANNOYED OR IRRITABLE: NEARLY EVERY DAY
GAD7 TOTAL SCORE: 9
GAD7 TOTAL SCORE: 9
5. BEING SO RESTLESS THAT IT IS HARD TO SIT STILL: NOT AT ALL
2. NOT BEING ABLE TO STOP OR CONTROL WORRYING: SEVERAL DAYS
7. FEELING AFRAID AS IF SOMETHING AWFUL MIGHT HAPPEN: SEVERAL DAYS
7. FEELING AFRAID AS IF SOMETHING AWFUL MIGHT HAPPEN: SEVERAL DAYS
1. FEELING NERVOUS, ANXIOUS, OR ON EDGE: SEVERAL DAYS

## 2023-06-14 ASSESSMENT — ENCOUNTER SYMPTOMS: BACK PAIN: 1

## 2023-06-14 NOTE — PROGRESS NOTES
Assessment & Plan     Pain of left great toe  Since making the appointment the patient kicked his left great toe against a counter a few days ago.  Very sore in the proximal phalanx of the left great toe as well as the distal metatarsal.  X-ray unremarkable.  So advised patient about wearing stiff bottom shoe and protection for the next week or so and seeing how he does.  If not continuing to heal revisit the issue.  - XR Foot Left G/E 3 Views; Future    Ankylosing spondylitis of sacral region (H)  Stable and continuing to work with rheumatology.  Actually met with the pain team somewhat recently reviewed that note.  They are working on multiple modalities to assist.  - oxyCODONE (ROXICODONE) 5 MG tablet; Take 1 tablet (5 mg) by mouth every 6 hours as needed for pain (maximum 6 tablet(s) per day)    DDD (degenerative disc disease), lumbar  As above  - oxyCODONE (ROXICODONE) 5 MG tablet; Take 1 tablet (5 mg) by mouth every 6 hours as needed for pain (maximum 6 tablet(s) per day)    Chronic pain syndrome  Up-to-date on routine monitoring.  Plan follow-up in 3 months        See Patient Instructions    Ksenia Lyles MD  [facility] NAWAF Francis is a 47 year old, presenting for the following health issues:  Back Pain and Toe Pain        6/14/2023    12:01 PM   Additional Questions   Roomed by Radha         6/14/2023    12:01 PM   Patient Reported Additional Medications   Patient reports taking the following new medications None     Back Pain     History of Present Illness       Back Pain:  He presents for follow up of back pain. Patient's back pain is a recurring problem.  Location of back pain:  Right lower back, left lower back, right middle of back, left middle of back, right side of neck, left side of neck, right shoulder, left shoulder, right hip, left hip, right side of waist and left side of waist  Description of back pain: dull ache, gnawing, sharp and stabbing  Back  "pain spreads: nowhere    Since patient first noticed back pain, pain is: always present, but gets better and worse  Does back pain interfere with his job:  Not applicable      He eats 2-3 servings of fruits and vegetables daily.He consumes 1 sweetened beverage(s) daily.He exercises with enough effort to increase his heart rate 9 or less minutes per day.  He exercises with enough effort to increase his heart rate 3 or less days per week.   He is taking medications regularly.  Today's YUDI-7 Score: 9         Here today in follow-up of ongoing chronic back issues and with a new toe issue as well      Review of Systems   Musculoskeletal: Positive for back pain.      Constitutional, HEENT, cardiovascular, pulmonary, gi and gu systems are negative, except as otherwise noted.      Objective    /79 (BP Location: Right arm, Patient Position: Sitting, Cuff Size: Adult Large)   Pulse 84   Temp 97.5  F (36.4  C) (Tympanic)   Resp 18   Ht 1.956 m (6' 5\")   Wt 134.2 kg (295 lb 14.4 oz)   SpO2 97%   BMI 35.09 kg/m    Body mass index is 35.09 kg/m .  Physical Exam   Alert, pleasant, upbeat, and in no apparent discomfort.  S1 and S2 normal, no murmurs, clicks, gallops or rubs. Regular rate and rhythm. Chest is clear; no wheezes or rales. No edema or JVD.  No significant bruising around his left great toe but he is tender the proximal phalanx and distal metatarsal.  Not so much over the joint itself.  Past labs reviewed with the patient.     Xray - Reviewed and interpreted by me.  No fracture noted in left foot                "

## 2023-06-14 NOTE — TELEPHONE ENCOUNTER
Medication:     albuterol (PROAIR HFA/PROVENTIL HFA/VENTOLIN HFA) 108 (90 Base) MCG/ACT inhaler  8.5 g 11 6/8/2022 6/8/2023 No   Sig - Route: Inhale 2 puffs into the lungs every 4 hours as needed for shortness of breath / dyspnea or wheezing - Inhalation     Date last written: 06/08/2022  Dispensed amount: 8.5 g  Refills: 11    Requested Pharmacy: [facility]    Pt's last office visit: 06/08/2023  Next scheduled office visit: Unknown      Per the RN/LPN medication refill protocol, writer is unable to refill this request.       Slime Szymanski LPN  Pulmonary Medicine:  [facility]  Phone: [phone] Fax: [phone]

## 2023-06-15 RX ORDER — ALBUTEROL SULFATE 90 UG/1
AEROSOL, METERED RESPIRATORY (INHALATION)
Qty: 25.5 G | Refills: 3 | Status: SHIPPED | OUTPATIENT
Start: 2023-06-15 | End: 2024-06-10

## 2023-06-15 NOTE — TELEPHONE ENCOUNTER
ENT referral, pulmonary office visit notes, and PFT report has been faxed to [facility] per pt request at F: [phone].    Slime Szymanski LPN  Pulmonary Medicine:  [facility]  Phone: [phone] Fax: [phone]

## 2023-06-25 DIAGNOSIS — I10 ESSENTIAL HYPERTENSION WITH GOAL BLOOD PRESSURE LESS THAN 140/90: ICD-10-CM

## 2023-06-26 ENCOUNTER — TELEPHONE (OUTPATIENT)
Dept: ENDOCRINOLOGY | Facility: CLINIC | Age: 48
End: 2023-06-26
Payer: MEDICARE

## 2023-06-26 RX ORDER — METOPROLOL SUCCINATE 200 MG/1
TABLET, EXTENDED RELEASE ORAL
Qty: 180 TABLET | Refills: 0 | Status: SHIPPED | OUTPATIENT
Start: 2023-06-26 | End: 2023-09-13

## 2023-06-26 NOTE — TELEPHONE ENCOUNTER
[facility] Call Center    Phone Message    May a detailed message be left on voicemail: yes     Reason for Call: Other: Per pt confirm the fax number that [facility] has for us is wrong. Per pt cannot change the fax number they have for the team unless it is the team calling to make that change. Per pt been trying for 3 weeks now and is almost out of medication. Per pt Katie ATEME needs the team to sign the forms for pt to be in their medication program again. Please call [facility] @ [phone] and select the option for healthcare workers to change out fax number on file for pt file.     Once this is done please call pt so he is aware. Per pt is real stress about this because if he doesn't complete this in a timely matter he will have to drop out of their program for the medication and pay out of pocket. Per pt is real tight on money. Please help and call pt back. Thank you!    Action Taken: Message routed to:  [facility] ENDO    Travel Screening: Not Applicable

## 2023-06-26 NOTE — TELEPHONE ENCOUNTER
Please refer to 6/9/23 telephone encounter for past details on Ozempic patient assistance forms. Writer checked in with pharmacy liaison, Junior, who has been working on forms.    Junior is needing Dr. Cuenca to sign forms.     Writer updated patient. Form left on Dr. Cuenca desk for signature today.      Kaelyn Shaw RN  Endocrine Care Coordinator  [facility]

## 2023-06-27 NOTE — TELEPHONE ENCOUNTER
Form completed and faxed to [employer] at [phone] with confirmation.        Patient has been informed.    Ksenia Hanson CMA  Adult Endocrinology  [facility] [place]

## 2023-06-29 ENCOUNTER — MYC MEDICAL ADVICE (OUTPATIENT)
Dept: PULMONOLOGY | Facility: CLINIC | Age: 48
End: 2023-06-29

## 2023-06-29 ENCOUNTER — TRANSFERRED RECORDS (OUTPATIENT)
Dept: HEALTH INFORMATION MANAGEMENT | Facility: CLINIC | Age: 48
End: 2023-06-29

## 2023-07-03 ENCOUNTER — MYC REFILL (OUTPATIENT)
Dept: FAMILY MEDICINE | Facility: CLINIC | Age: 48
End: 2023-07-03
Payer: MEDICARE

## 2023-07-03 DIAGNOSIS — M45.8 ANKYLOSING SPONDYLITIS OF SACRAL REGION (H): ICD-10-CM

## 2023-07-03 DIAGNOSIS — E55.9 VITAMIN D DEFICIENCY: ICD-10-CM

## 2023-07-03 DIAGNOSIS — M51.369 DDD (DEGENERATIVE DISC DISEASE), LUMBAR: ICD-10-CM

## 2023-07-03 RX ORDER — CHOLECALCIFEROL (VITAMIN D3) 1250 MCG
CAPSULE ORAL
Qty: 24 CAPSULE | Refills: 0 | Status: SHIPPED | OUTPATIENT
Start: 2023-07-03 | End: 2024-07-15

## 2023-07-03 RX ORDER — OXYCODONE HYDROCHLORIDE 5 MG/1
5 TABLET ORAL EVERY 6 HOURS PRN
Qty: 35 TABLET | Refills: 0 | Status: CANCELLED | OUTPATIENT
Start: 2023-07-03

## 2023-07-09 ENCOUNTER — MYC REFILL (OUTPATIENT)
Dept: FAMILY MEDICINE | Facility: CLINIC | Age: 48
End: 2023-07-09
Payer: MEDICARE

## 2023-07-09 DIAGNOSIS — M45.8 ANKYLOSING SPONDYLITIS OF SACRAL REGION (H): ICD-10-CM

## 2023-07-09 DIAGNOSIS — M51.369 DDD (DEGENERATIVE DISC DISEASE), LUMBAR: ICD-10-CM

## 2023-07-10 RX ORDER — OXYCODONE HYDROCHLORIDE 5 MG/1
5 TABLET ORAL EVERY 6 HOURS PRN
Qty: 35 TABLET | Refills: 0 | Status: SHIPPED | OUTPATIENT
Start: 2023-07-10 | End: 2023-07-21

## 2023-07-16 DIAGNOSIS — M45.8 ANKYLOSING SPONDYLITIS OF SACRAL REGION (H): ICD-10-CM

## 2023-07-16 DIAGNOSIS — M51.369 DDD (DEGENERATIVE DISC DISEASE), LUMBAR: ICD-10-CM

## 2023-07-16 DIAGNOSIS — Z86.19 HISTORY OF SHINGLES: ICD-10-CM

## 2023-07-16 DIAGNOSIS — E78.1 HYPERTRIGLYCERIDEMIA: ICD-10-CM

## 2023-07-17 RX ORDER — LIDOCAINE 50 MG/G
OINTMENT TOPICAL
Qty: 50 G | Refills: 0 | Status: SHIPPED | OUTPATIENT
Start: 2023-07-17 | End: 2023-07-21

## 2023-07-17 RX ORDER — ROSUVASTATIN CALCIUM 40 MG/1
TABLET, COATED ORAL
Qty: 90 TABLET | Refills: 0 | Status: SHIPPED | OUTPATIENT
Start: 2023-07-17 | End: 2023-09-13

## 2023-07-17 RX ORDER — NABUMETONE 500 MG/1
TABLET, FILM COATED ORAL
Qty: 120 TABLET | Refills: 0 | Status: SHIPPED | OUTPATIENT
Start: 2023-07-17 | End: 2023-07-21

## 2023-07-21 ENCOUNTER — OFFICE VISIT (OUTPATIENT)
Dept: PALLIATIVE MEDICINE | Facility: OTHER | Age: 48
End: 2023-07-21
Payer: MEDICARE

## 2023-07-21 VITALS — OXYGEN SATURATION: 97 % | HEART RATE: 81 BPM | DIASTOLIC BLOOD PRESSURE: 73 MMHG | SYSTOLIC BLOOD PRESSURE: 113 MMHG

## 2023-07-21 DIAGNOSIS — G89.29 CHRONIC INTRACTABLE PAIN: Primary | ICD-10-CM

## 2023-07-21 DIAGNOSIS — M45.8 ANKYLOSING SPONDYLITIS OF SACRAL REGION (H): ICD-10-CM

## 2023-07-21 DIAGNOSIS — G57.12 MERALGIA PARESTHETICA, LEFT LOWER LIMB: ICD-10-CM

## 2023-07-21 DIAGNOSIS — Z86.19 HISTORY OF SHINGLES: ICD-10-CM

## 2023-07-21 DIAGNOSIS — M48.02 CERVICAL STENOSIS OF SPINAL CANAL: ICD-10-CM

## 2023-07-21 DIAGNOSIS — M79.18 MYOFASCIAL MUSCLE PAIN: ICD-10-CM

## 2023-07-21 DIAGNOSIS — S31.809S GLUTEAL CLEFT WOUND, UNSPECIFIED LATERALITY, SEQUELA: ICD-10-CM

## 2023-07-21 DIAGNOSIS — E08.42 DIABETIC POLYNEUROPATHY ASSOCIATED WITH DIABETES MELLITUS DUE TO UNDERLYING CONDITION (H): ICD-10-CM

## 2023-07-21 DIAGNOSIS — M51.369 DDD (DEGENERATIVE DISC DISEASE), LUMBAR: ICD-10-CM

## 2023-07-21 DIAGNOSIS — M79.2 NEUROPATHIC PAIN: ICD-10-CM

## 2023-07-21 PROCEDURE — 99214 OFFICE O/P EST MOD 30 MIN: CPT | Performed by: NURSE PRACTITIONER

## 2023-07-21 PROCEDURE — G0463 HOSPITAL OUTPT CLINIC VISIT: HCPCS | Performed by: NURSE PRACTITIONER

## 2023-07-21 RX ORDER — DRONABINOL 2.5 MG/1
2.5 CAPSULE ORAL 3 TIMES DAILY PRN
Qty: 90 CAPSULE | Refills: 0 | COMMUNITY
Start: 2023-07-21 | End: 2023-09-15

## 2023-07-21 RX ORDER — METHOCARBAMOL 500 MG/1
TABLET, FILM COATED ORAL
Qty: 240 TABLET | Refills: 1 | Status: SHIPPED | OUTPATIENT
Start: 2023-07-21 | End: 2024-05-02

## 2023-07-21 RX ORDER — LIDOCAINE/PRILOCAINE 2.5 %-2.5%
CREAM (GRAM) TOPICAL PRN
Qty: 60 G | Refills: 3 | Status: SHIPPED | OUTPATIENT
Start: 2023-07-21 | End: 2023-10-24

## 2023-07-21 RX ORDER — PREGABALIN 150 MG/1
150 CAPSULE ORAL 3 TIMES DAILY
Qty: 270 CAPSULE | Refills: 1 | Status: SHIPPED | OUTPATIENT
Start: 2023-07-21 | End: 2024-05-02

## 2023-07-21 RX ORDER — NABUMETONE 500 MG/1
500-1000 TABLET, FILM COATED ORAL 2 TIMES DAILY PRN
Qty: 120 TABLET | Refills: 1 | Status: SHIPPED | OUTPATIENT
Start: 2023-07-21 | End: 2023-11-20

## 2023-07-21 RX ORDER — OXYCODONE HYDROCHLORIDE 5 MG/1
5 TABLET ORAL 3 TIMES DAILY PRN
Qty: 50 TABLET | Refills: 0 | Status: SHIPPED | OUTPATIENT
Start: 2023-07-21 | End: 2023-08-29

## 2023-07-21 RX ORDER — LIDOCAINE 50 MG/G
OINTMENT TOPICAL 2 TIMES DAILY PRN
Qty: 50 G | Refills: 3 | Status: SHIPPED | OUTPATIENT
Start: 2023-07-21

## 2023-07-21 ASSESSMENT — PAIN SCALES - GENERAL: PAINLEVEL: MODERATE PAIN (4)

## 2023-07-21 NOTE — PROGRESS NOTES
Patient presents to the clinic today for a visit  with JOCELYN Ron CNP            4/18/2023     1:16 PM 6/8/2023     3:18 PM 7/21/2023    12:50 PM   PEG Score   PEG Total Score 4.67 5 5       UDS/CSA- 6/08/23    Medications-oxycodone     QUESTIONS:    Sneha Nuñez MA  [facility] [place]

## 2023-07-21 NOTE — PATIENT INSTRUCTIONS
After Visit Instructions:     Thank you for coming to [facility] for your care. It is my goal to partner with you to help you reach your optimal state of health.   Continue daily self-care, identifying contributing factors, and monitoring variations in pain level. Continue to integrate self-care into your life.      30 minutes Video or Clinic follow-up with JOCELYN Zavala NP-C in 2 months. Ok to schedule up to three follow up appts at a time alternating clinic and virtual   Procedures recommended:   Trigger Points and Bilateral Lateral femoral cutaneous nerve block. Schedule two separate appointments with Dr Montgomery    Medication Management :   Refills of multiple pain related medications sent in   Oxycodone 5 mg sent to be filled July 21, 2023      JOCELYN Padgett NP-C  [facility]  [facility] - Monday and Friday  [place] [facility]") - Tuesday [place] - Thursday    Be sure to request ALL medication refills 5 days prior to the due date unless you will see your medical provider in an appointment before the due date.  This is YOUR responsibility. Do not expect same day refills. If you do not plan ahead you may run out of medications.   NO early refills are allowed. It is your responsibility to manage your medications responsibility and keep them safely stored. Lost or destroyed medications WILL NOT be replaced    Scheduling/Clinic telephone Number for ALL locations:  [phone]    After Hours On-Call Service:  [phone]    Call with any questions about your care and for scheduling assistance.   Calls are returned Monday through Friday between 8 AM and 4:00 PM. We usually get back to you within 2 business days depending on the issue/request.    If we are prescribing your medications:  For opioid medication refills, call the clinic or send a [employer] message 7 days in advance.  Please include:  Your name and date of birth.   Name of requested medication  Name  of the pharmacy.  For non-opioid medications, call your pharmacy directly to request a refill. Please allow 3-4 days to be processed.   Per [place] State Law:  All controlled substance prescriptions must be filled within 30 days of being written.    For those controlled substances allowing refills, pickup must occur within 30 days of last fill.      We believe regular attendance is key to your success in our program!    Any time you are unable to keep your appointment we ask that you call us at least 24 hours in advance to cancel.This will allow us to offer the appointment time to another patient.   Multiple missed appointments may lead to dismissal from the clinic.

## 2023-07-21 NOTE — Clinical Note
Dr Lyles,   I am assuming prescribing of all pain related medications. Please let me know if you have any questions or concerns.   JOCELYN Padgett, NP-C [facility] [place]

## 2023-07-21 NOTE — PROGRESS NOTES
[facility]    CHIEF COMPLAINT: Chronic Pain.    INTERVAL HISTORY:  Last seen on 6/8/23.       Recommendations/plan at the last visit included:  60 minutes Clinic follow-up with JOCELYN Zavala NP-C in July or expand August appt to 60 min  Labs: Urine drug screen & controlled substance agreement   Medication Management :   Continue with Dr Lyles for pain related medications until our next appt.     Since last visit:   - Multiple chronic pain concerns. UDS collected 6/8/23 was good, will takeover pain medication management.       Pain Information today: Moderate Pain (4)/10. Location of pain: Stomach, pain in calves, plantar facilities, spine, neck .    Annual Requirements last collected:  6/8/23 UDS, CSA 7/21/23     Current Pain Relevant Medications:    Acetaminophen 500 mg BID & with Oxycodone.   Cymbalta 120 mg in AM  Lyrica 150 mg BID  Methocarbamol 500 mg 1-2 QID PRN  Nabumetone 500 mg 1-2 times a day  Lidocaine gel topically 2-3 times daily     Pain Related Controlled Substances:   Clonazepam 0.5 mg at HS. Occasionally 1 tab during the day.  Lunesta 3 mg  Oxycodone 5 mg 1-2 tabs per day PRN              Total opiate dose: 7.5-15 MME     Previous Pain Relevant Medications: (H--helped; HI--Helped initially; SWH--Somewhat helpful; NH--No help; W--worse; SE--side effects; ?--Unsure if helpful)   NOTE: This medication information taken from patient's intake form, not medical records.   Opiates: Oxycodone: H, Tramadol:[facility]. SE, Codeine: NH  NSAIDS: Celebrex: [facility], Nabumetone:H, Naproxen: SE  Anti-migraine medications: Midrin? , Maxalt:H  Muscle Relaxants: Baclofen:[facility], Flexeril:H, Tizaidine:?, Methocarbamol:?  Neuropathics: Lyrica:H  Anti-depressants: Abilify:SE, Brintellix: NH, Wellbutrin: ?, Cymbalta: NH, Nortriptyline: NH, Seroquel:   [facility], Risperdal: NH, Effexor:?, Cymbalta ?  Anxiety medications: Xanax: H, Klonpin: H, lorazepam: H      Topicals: Lidocaine:H  Sleep  medications:Belsomra: NH, Melatonin:H, Trazodone NH, Ambien:NH  Other medications not covered above: Humira: All, Enbrel; H, dicyclomine:H, Medrol:[facility], Prednisone:H     Any illicit drug use: denies   EtOH use: none   Caffeine use: 6-8 per day   Nicotine use: None     Past Pain Treatments:   Pain Clinic:   Yes  [facility] pain management: For spinal injections with Dr Diaz, MAPS: injections, nerve ablation, [facility] for SCS treatment.  Did trial but did not find it beneficial.   [facility] chronic pain program.    PT: Yes  For other reasons. Neck, back and feet  Psychologist: Yes ongoing with private therapist.at  [facility].   Chiropractor: Yes years ag              Acupuncture: Yes NH  Pharmacotherapy:  Opioids: Yes  Non-opioids:    Yes   TENs Unit:Yes H for back   Injections: Yes Many for neck and back      Surgeries related to pain: Yes   October 2007 L5-S1 laminectomy, micro discectomy          THE 4 As OF OPIOID MAINTENANCE ANALGESIA    Analgesia: Is pain relief clinically significant? YES   Activity: Is patient functional and able to perform Activities of Daily Living? YES   Adverse effects: Is patient free from adverse side effects from opiates? YES   Adherence to Rx protocol: Is patient adhering to Controlled Substance Agreement and taking medications ONLY as ordered? YES     Is Narcan prescribed for opiate use >50 MME daily or concurrent use of opiates and benzodiazepines?  Yes prescribed by this writer 8/10/2020    [place] Board of Pharmacy Data Base Reviewed:    YES; No concern for abuse or misuse of controlled medications based on this report. Reviewed [facility] July 20, 2023- no concerning fills.      PHYSICAL EXAM    Vitals:    07/21/23 1250   BP: 113/73   Pulse: 81   SpO2: 97%       Constitutional: healthy, alert and no distress A&O.   Patient is appropriate.  Psychiatric/mental status: Alert, without lethargy or stupor. Appropriate affect.     Neurologic exam:  CN:  Cranial nerves 2-12 are grossly  normal.    MUSCULOSKELETAL:     Posture: Upright, shoulders and pelvis are leveled. Yes  Antalgic Gait Pattern?: Yes     DIRE Score for ongoing opioid management is calculated as follows:    Diagnosis = 3 pts (advanced condition; severe pain/objective findings)    Intractability = 3 pts (patient fully engaged but inadequate response to treatments)    Risk        Psych = 3 pts (no significant personality dysfunction/mental illness; good communication with clinic)         Chem Hlth = 3 pts (no history of chemical dependency; not drug-focused)       Reliability = 2 pts (occasional difficulties with compliance; generally reliable)       Social = 2 pts (reduction in some relationships/life rolls)       (Psych + Chem hlth + Reliability + Social) = 16    Efficacy = 2 pts (moderate benefit/function; low med dose; too early/not tried meds)    DIRE Score = 18        7-13: likely NOT suitable candidate for long-term opioid analgesia       14-21: may be a suitable candidate for long-term opioid analgesia     Previous Diagnostic Tests:   Imaging Studies:   MR LUMBAR SPINE W/O CONTRAST 6/27/2019  Impression:   1. Multilevel lumbar spondylosis, most pronounced at L5-S1 with  moderate to severe left and moderate right neural foraminal stenosis as well as narrowing of the left lateral recess and abutment the traversing left S1 nerve root.   2. Additional multilevel degenerative changes of the lumbar spine as described above.     PAIN RELEVANT CONDITIONS:   1.  Postherpetic neuralgia  2.  Ankylosing spondylosis, Lumbar DDD with left S1 nerve involvement, lumbar stenosis  3.  Chronic migraine headaches  4.  History: Complex medical issues, see history    DIAGNOSIS AND PLAN:     (G89.29) Chronic intractable pain  (primary encounter diagnosis)  (M79.18) Myofascial muscle pain  (G57.12) Meralgia paresthetica, left lower limb  (M45.8) Ankylosing spondylitis of sacral region (H)  (M51.36) DDD (degenerative disc disease), lumbar  (E08.42)  Diabetic polyneuropathy associated with diabetes mellitus due to underlying condition (H)  (M48.02) Cervical stenosis of spinal canal  (Z86.19) History of shingles  (S31.809S) Gluteal cleft wound, unspecified laterality, sequela  (M79.2) Neuropathic pain  Comment: Taking over pain related medications. Injections ordered as noted below.   Plan:   nabumetone (RELAFEN) 500 MG tablet  pregabalin  (LYRICA) 150 MG capsule  PAIN INJECTION EVAL/TREAT/FOLLOW UP,      oxyCODONE (ROXICODONE) 5 MG tablet,  methocarbamol (ROBAXIN) 500 MG tablet   lidocaine (XYLOCAINE) 5 % external ointment  lidocaine-prilocaine (EMLA) 2.5-2.5 % external cream        PATIENT INSTRUCTIONS:     Diagnosis reviewed, treatment option addressed, and risk/benefits discussed.  Self-care instructions given.  I am recommending a multidisciplinary treatment plan to help this patient better manage pain.    Remember to request ALL medication refills 5 BUSINESS days before you run out.     30 minutes Video or Clinic follow-up with JOCELYN Zavala NP-C in 2 months. Ok to schedule up to three follow up appts at a time alternating clinic and virtual   Procedures recommended:   Trigger Points and Bilateral Lateral femoral cutaneous nerve block. Schedule two separate appointments with Dr Montgomery    Medication Management :   Refills of multiple pain related medications sent in   Oxycodone 5 mg sent to be filled July 21, 2023      I have reviewed the note as documented above.  This accurately captures the substance of my conversation with the patient.  A total of 35 minutes of preparation, care, and consultation were spent on this visit today.     JOCELYN Padgett NP-C  [facility]    (Information in italics and blue color are taken from previous pain and consulting medical providers notes and are documented as such)

## 2023-07-25 ENCOUNTER — VIRTUAL VISIT (OUTPATIENT)
Dept: PHARMACY | Facility: CLINIC | Age: 48
End: 2023-07-25
Payer: COMMERCIAL

## 2023-07-25 DIAGNOSIS — G89.4 CHRONIC PAIN SYNDROME: ICD-10-CM

## 2023-07-25 DIAGNOSIS — J45.30 MILD PERSISTENT ASTHMA WITHOUT COMPLICATION: ICD-10-CM

## 2023-07-25 DIAGNOSIS — E11.9 TYPE 2 DIABETES MELLITUS WITHOUT COMPLICATION, WITHOUT LONG-TERM CURRENT USE OF INSULIN (H): Primary | ICD-10-CM

## 2023-07-25 DIAGNOSIS — M45.8 ANKYLOSING SPONDYLITIS OF SACRAL REGION (H): ICD-10-CM

## 2023-07-25 PROCEDURE — 99207 PR NO CHARGE LOS: CPT | Performed by: PHARMACIST

## 2023-07-25 RX ORDER — FAMOTIDINE 20 MG/1
20 TABLET, FILM COATED ORAL DAILY
COMMUNITY

## 2023-07-25 RX ORDER — FLUTICASONE PROPIONATE AND SALMETEROL XINAFOATE 45; 21 UG/1; UG/1
2 AEROSOL, METERED RESPIRATORY (INHALATION) 2 TIMES DAILY
COMMUNITY
End: 2023-11-15

## 2023-07-25 NOTE — PATIENT INSTRUCTIONS
"Recommendations from today's MTM visit:                                                         No medication changes recommended today.  ACT sent via [email or web address].    Follow-up: 3 months    It was great speaking with you today.  I value your experience and would be very thankful for your time in providing feedback in our clinic survey. In the next few days, you may receive an email or text message from [employer] with a link to a survey related to your  clinical pharmacist.\"     To schedule another MTM appointment, please call the clinic directly or you may call the MTM scheduling line at [phone] or toll-free at [phone].     My Clinical Pharmacist's contact information:                                                      Please feel free to contact me with any questions or concerns you have.      Radha Mcdonald, Pharm.D, [facility] [place]  Medication Therapy Management Pharmacist      "
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
## 2023-08-01 DIAGNOSIS — E78.1 HYPERTRIGLYCERIDEMIA: ICD-10-CM

## 2023-08-01 DIAGNOSIS — E78.5 HYPERLIPIDEMIA LDL GOAL <70: ICD-10-CM

## 2023-08-01 RX ORDER — OMEGA-3-ACID ETHYL ESTERS 1 G/1
CAPSULE, LIQUID FILLED ORAL
Qty: 360 CAPSULE | Refills: 0 | Status: SHIPPED | OUTPATIENT
Start: 2023-08-01 | End: 2023-10-30
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
## 2023-08-03 ASSESSMENT — ASTHMA QUESTIONNAIRES: ACT_TOTALSCORE: 20

## 2023-08-03 ASSESSMENT — PATIENT HEALTH QUESTIONNAIRE - PHQ9
10. IF YOU CHECKED OFF ANY PROBLEMS, HOW DIFFICULT HAVE THESE PROBLEMS MADE IT FOR YOU TO DO YOUR WORK, TAKE CARE OF THINGS AT HOME, OR GET ALONG WITH OTHER PEOPLE: VERY DIFFICULT
SUM OF ALL RESPONSES TO PHQ QUESTIONS 1-9: 10
SUM OF ALL RESPONSES TO PHQ QUESTIONS 1-9: 10

## 2023-08-04 ENCOUNTER — VIRTUAL VISIT (OUTPATIENT)
Dept: FAMILY MEDICINE | Facility: CLINIC | Age: 48
End: 2023-08-04
Payer: MEDICARE

## 2023-08-04 DIAGNOSIS — J02.9 PHARYNGITIS, UNSPECIFIED ETIOLOGY: ICD-10-CM

## 2023-08-04 DIAGNOSIS — J01.00 ACUTE NON-RECURRENT MAXILLARY SINUSITIS: Primary | ICD-10-CM

## 2023-08-04 PROCEDURE — 99214 OFFICE O/P EST MOD 30 MIN: CPT | Mod: VID | Performed by: FAMILY MEDICINE

## 2023-08-04 RX ORDER — AZITHROMYCIN 250 MG/1
TABLET, FILM COATED ORAL
Qty: 6 TABLET | Refills: 0 | Status: SHIPPED | OUTPATIENT
Start: 2023-08-04 | End: 2023-09-13

## 2023-08-04 ASSESSMENT — ANXIETY QUESTIONNAIRES
7. FEELING AFRAID AS IF SOMETHING AWFUL MIGHT HAPPEN: MORE THAN HALF THE DAYS
6. BECOMING EASILY ANNOYED OR IRRITABLE: MORE THAN HALF THE DAYS
4. TROUBLE RELAXING: MORE THAN HALF THE DAYS
GAD7 TOTAL SCORE: 12
3. WORRYING TOO MUCH ABOUT DIFFERENT THINGS: MORE THAN HALF THE DAYS
1. FEELING NERVOUS, ANXIOUS, OR ON EDGE: MORE THAN HALF THE DAYS
IF YOU CHECKED OFF ANY PROBLEMS ON THIS QUESTIONNAIRE, HOW DIFFICULT HAVE THESE PROBLEMS MADE IT FOR YOU TO DO YOUR WORK, TAKE CARE OF THINGS AT HOME, OR GET ALONG WITH OTHER PEOPLE: VERY DIFFICULT
GAD7 TOTAL SCORE: 12
5. BEING SO RESTLESS THAT IT IS HARD TO SIT STILL: NOT AT ALL
2. NOT BEING ABLE TO STOP OR CONTROL WORRYING: MORE THAN HALF THE DAYS

## 2023-08-04 NOTE — PROGRESS NOTES
Tito is a 48 year old who is being evaluated via a billable video visit.      How would you like to obtain your AVS? MyChart  If the video visit is dropped, the invitation should be resent by: Text to cell phone: [phone]  Will anyone else be joining your video visit? No          Assessment & Plan       ICD-10-CM    1. Acute non-recurrent maxillary sinusitis  J01.00 azithromycin (ZITHROMAX) 250 MG tablet      2. Pharyngitis, unspecified etiology  J02.9 azithromycin (ZITHROMAX) 250 MG tablet            Review of external notes as documented elsewhere in note  No LOS data to display   Time spent by me doing chart review, history and exam, documentation and further activities per the note       There are no Patient Instructions on file for this visit.    Joel Marinelli MD  [facility]    Radha Francis is a 48 year old, presenting for the following health issues:  URI      8/4/2023    10:03 AM   Additional Questions   Roomed by Cuca   Accompanied by none         8/4/2023    10:03 AM   Patient Reported Additional Medications   Patient reports taking the following new medications none       URI    History of Present Illness       Reason for visit:  Sore throat with spots and halitosis, pain in sinuses behind my cheek bones  Symptom onset:  3-7 days ago  Symptoms include:  Sore throat with spots and halitosis, pain in sinuses behind my cheek bones  Symptom intensity:  Moderate  Symptom progression:  Worsening  Had these symptoms before:  Yes  Has tried/received treatment for these symptoms:  Yes  Previous treatment was successful:  Yes  Prior treatment description:  Antibiotics    He eats 2-3 servings of fruits and vegetables daily.He consumes 2 sweetened beverage(s) daily.He exercises with enough effort to increase his heart rate 9 or less minutes per day.  He exercises with enough effort to increase his heart rate 3 or less days per week.   He is taking medications regularly.                Review of Systems   Constitutional, HEENT, cardiovascular, pulmonary, gi and gu systems are negative, except as otherwise noted.      Objective           Vitals:  No vitals were obtained today due to virtual visit.    Physical Exam   GENERAL: Healthy, alert and no distress  EYES: Eyes grossly normal to inspection.  No discharge or erythema, or obvious scleral/conjunctival abnormalities.  RESP: No audible wheeze, cough, or visible cyanosis.  No visible retractions or increased work of breathing.    SKIN: Visible skin clear. No significant rash, abnormal pigmentation or lesions.  NEURO: Cranial nerves grossly intact.  Mentation and speech appropriate for age.  PSYCH: Mentation appears normal, affect normal/bright, judgement and insight intact, normal speech and appearance well-groomed.                Video-Visit Details    Type of service:  Video Visit   Video Length 17 mins    Originating Location (pt. Location): Home    Distant Location (provider location):  On-site  Platform used for Video Visit: [email or web address]

## 2023-08-07 ENCOUNTER — PREP FOR PROCEDURE (OUTPATIENT)
Dept: SLEEP MEDICINE | Facility: CLINIC | Age: 48
End: 2023-08-07
Payer: MEDICARE

## 2023-08-07 ENCOUNTER — TELEPHONE (OUTPATIENT)
Dept: SLEEP MEDICINE | Facility: CLINIC | Age: 48
End: 2023-08-07
Payer: MEDICARE

## 2023-08-07 DIAGNOSIS — Z00.6 RESEARCH EXAM: Primary | ICD-10-CM

## 2023-08-07 NOTE — TELEPHONE ENCOUNTER
Patient is a 49 yo male with history of REM sleep Behavior Disorder.  The patient is completing a overnight in laboratory PSG as part of research project.  The patient is at low risk of sleep disordered breathing.      Study is being completed to evaluated REM sleep motor tone.  Please let the patient sleep in as long as possible in the AM to capture as much REM sleep as possible.  4-limb EMG montage.  Please keep video camera wide so that entire body is visible.      If patient turns out to have LLOYD or other sleep disordered breathing go ahead and treat normally per split night protocol.     no

## 2023-08-07 NOTE — PROGRESS NOTES
Patient is a 49 yo male with history of REM sleep Behavior Disorder.  The patient is completing a overnight in laboratory PSG as part of research project.  The patient is at low risk of sleep disordered breathing.      Study is being completed to evaluated REM sleep motor tone.  Please let the patient sleep in as long as possible in the AM to capture as much REM sleep as possible.  4-limb EMG montage.  Please keep video camera wide so that entire body is visible.      If patient turns out to have LLOYD or other sleep disordered breathing go ahead and treat normally per split night protocol.

## 2023-08-08 ASSESSMENT — PAIN SCALES - PAIN ENJOYMENT GENERAL ACTIVITY SCALE (PEG)
INTERFERED_ENJOYMENT_LIFE: 6
PEG_TOTALSCORE: 5.33
INTERFERED_GENERAL_ACTIVITY: 6
AVG_PAIN_PASTWEEK: 4

## 2023-08-09 ENCOUNTER — OFFICE VISIT (OUTPATIENT)
Dept: PALLIATIVE MEDICINE | Facility: CLINIC | Age: 48
End: 2023-08-09
Attending: NURSE PRACTITIONER
Payer: MEDICARE

## 2023-08-09 VITALS — OXYGEN SATURATION: 98 % | SYSTOLIC BLOOD PRESSURE: 124 MMHG | HEART RATE: 68 BPM | DIASTOLIC BLOOD PRESSURE: 85 MMHG

## 2023-08-09 DIAGNOSIS — M79.18 MYOFASCIAL MUSCLE PAIN: ICD-10-CM

## 2023-08-09 PROCEDURE — 99207 PR NO CHARGE LOS: CPT | Performed by: PAIN MEDICINE

## 2023-08-09 RX ORDER — BUPIVACAINE HYDROCHLORIDE 2.5 MG/ML
10 INJECTION, SOLUTION INFILTRATION; PERINEURAL ONCE
Status: COMPLETED | OUTPATIENT
Start: 2023-08-09 | End: 2023-08-09

## 2023-08-09 RX ADMIN — BUPIVACAINE HYDROCHLORIDE 25 MG: 2.5 INJECTION, SOLUTION INFILTRATION; PERINEURAL at 10:08

## 2023-08-09 ASSESSMENT — PAIN SCALES - GENERAL: PAINLEVEL: MODERATE PAIN (4)

## 2023-08-09 NOTE — PATIENT INSTRUCTIONS
[facility]  Post Procedure Instructions    Today you had:  trigger point injections   occipital nerve block   bursa injection  Joint Injection    Medications used:  lidocaine   bupivacaine   kenalog   dexamethasone        Go to the emergency room if you develop any shortness of breath  Monitor the injection sites for signs and symptoms of infection-fever, chills, redness, swelling, warmth, or drainage to areas.  You may have soreness at injection sites for up to 24 hours.  It may take up to 14 days for the steroid medication to start working although you may feel the effect as early as a few days after the procedure.     You may apply ice to the painful areas to help minimize the discomfort of the needle pokes.  Do not apply heat to sites for at least 12 hours.  You may use anti-inflammatory medications or Tylenol for pain control if necessary    Pain Clinic phone number during work hours (Monday through Friday 8 am-4:30 pm) at [phone] or the Provider Line after hours at [phone]:

## 2023-08-09 NOTE — PROGRESS NOTES
Joel was seen today for pain.    Diagnoses and all orders for this visit:    Myofascial muscle pain  -     PAIN INJECTION EVAL/TREAT/FOLLOW UP  -     NO CHARGE LOS  -     BUPivacaine (MARCAINE) 0.25 % injection 25 mg        Trigger points were identified by patient, and marked when appropriate.  The area was prepped with Chloroprep.    Using clean technique, injections were completed using a 25G, 3.5 inch needle.  After negative aspiration, injection was completed.  A total of 5 locations were injected.  When possible, tissue was retracted from the chest wall to avoid lung injury.    Muscle groups injected:  Bilateral quadratus lumborum  latissimus dorsi, paraspinal muscles     Injection solution contained:  10ml of 0.25% bupivacaine         Bubba Montgomery MD  [facility]

## 2023-08-12 DIAGNOSIS — Z00.6 RESEARCH EXAM: Primary | ICD-10-CM

## 2023-08-13 DIAGNOSIS — E78.1 HYPERTRIGLYCERIDEMIA: ICD-10-CM

## 2023-08-14 RX ORDER — FENOFIBRATE 48 MG/1
48 TABLET, COATED ORAL DAILY
Qty: 90 TABLET | Refills: 0 | Status: SHIPPED | OUTPATIENT
Start: 2023-08-14 | End: 2023-11-06

## 2023-08-14 NOTE — TELEPHONE ENCOUNTER
Our goal is to have forms completed within 72 hours, however some forms may require a visit or additional information.    What clinic location was the form placed at [facility]    Who is the form from? Orthology  Where did the form come from? Faxed to clinic   The form was placed in the inbox of Dr. Renteria    Please fax to [phone]    Additional comments:      [de-identified] : Pt was informed about CNs role/ ST[facility] program and overview of transitional care reviewed with patient. In addition, yellow contact card was provided. Pt educated on topics of importance such as compliance with all provider visits, prescribed medication regimen, and low salt diet. Pt encouraged calling CN with any issues, concerns or questions, also educated to notify CN if experiencing CP, SOB , cough, increased mucus/phlegm production, abdominal discomfort/swelling, difficulty sleeping or lying flat, fever, chills, fatigue, weight gain of 2-3lbs in 24 hours or 5lbs in one week,  dizziness, lightheadedness, n/v/d/c, swelling to extremities and/or any signs of CHF exacerbation as reviewed. Reassurance provided. Will continue to monitor

## 2023-08-15 ENCOUNTER — VIRTUAL VISIT (OUTPATIENT)
Dept: PALLIATIVE MEDICINE | Facility: CLINIC | Age: 48
End: 2023-08-15
Attending: NURSE PRACTITIONER
Payer: MEDICARE

## 2023-08-15 DIAGNOSIS — F41.9 ANXIETY AND DEPRESSION: ICD-10-CM

## 2023-08-15 DIAGNOSIS — G89.29 CHRONIC INTRACTABLE PAIN: ICD-10-CM

## 2023-08-15 DIAGNOSIS — F32.A ANXIETY AND DEPRESSION: ICD-10-CM

## 2023-08-15 DIAGNOSIS — M48.02 CERVICAL STENOSIS OF SPINAL CANAL: ICD-10-CM

## 2023-08-15 DIAGNOSIS — E08.42 DIABETIC POLYNEUROPATHY ASSOCIATED WITH DIABETES MELLITUS DUE TO UNDERLYING CONDITION (H): ICD-10-CM

## 2023-08-15 DIAGNOSIS — M47.816 SPONDYLOSIS OF LUMBAR REGION WITHOUT MYELOPATHY OR RADICULOPATHY: ICD-10-CM

## 2023-08-15 PROCEDURE — 96156 HLTH BHV ASSMT/REASSESSMENT: CPT | Mod: 95 | Performed by: PSYCHOLOGIST

## 2023-08-15 NOTE — TELEPHONE ENCOUNTER
Diagnosis, Referred by & from: Anal Fissure   Appt date: 11/3/2023   NOTES STATUS DETAILS   OFFICE NOTE from referring provider N/A    OFFICE NOTE from other specialist Care Everywhere / Internal [facility]:  23, 23 - PCC OV with Dr. Ospina  3/23/21 - PCC OV with Dr. Lyles    [facility]:  3/2/23, 10/14/22 - GI OV with Dr. Munoz    [facility] - M22 - PCC OV with Dr. Griggs  8/3/22 - URO OV with Dr. Khoury  22 - URO OV with Dr. Collazo  3/30/21 - GI OV with KEVAN Iverson    [facility]:  21 - CR OV with Anitha Farrell NP  21 - CR OV with Dr. Tony Quach:  6/10/21 - UC OV with Tana Westbrook NP  7/3/18 - ONC OV with [facility] Maynard NP    [facility] with Dr. Beavers   DISCHARGE SUMMARY from hospital N/A    DISCHARGE REPORT from the ER Care Everywhere / Internal [facility]:  23 - ED OV with Dr. Yusef Quach:  22 - ED OV with Dr. Morrison  22 - ED OV with Dr. Cristina  22 - ED OV with Dr. Singh  * Additional in Care Everywhere    [facility]:  19 - ED OV with Dr. Dirk Loving [facility]:  19 - ED OV with Dr. Heart   OPERATIVE REPORT Care Everywhere / Internal [facility]:  8/15/13 - OP Note for SIGMOID RESECTION, MOBILIZATION OF SPLENIC FISSURE, COLOPROCTOSCOPY with Dr. Reshma Quach:  11 - OP Note for HERNIORRHAPHY UMBILICAL with Dr. Beavers   MEDICATION LIST Internal    LABS     ANAL PAP/CEA N/A    BIOPSIES/PATHOLOGY RELATED TO DIAGNOSIS In process / Internal [facility]:  8/15/13 - Colon Biopsy (Case: [id number])   DIAGNOSTIC PROCEDURES     PFC TESTING (from the Pelvic floor center includes Manometry, PDNL, EMG, etc.) N/A    COLONOSCOPY In process [facility]:  9/15/20 - Colonoscopy   UPPER ENDOSCOPY (EGD) In process [facility]:  9/15/20 - EGD  19 - EGD   FLEX SIGMOIDOSCOPY In process [facility]:  11/3/16 - Flexible Sigmoidoscopy   ERCP N/A    IMAGING (DISC & REPORT)      CT Received / Internal  [facility]:  5/5/23 - CT Abd/Pelvis    [facility]:  12/30/22 - CT Abd/Pelvis  11/4/22 - CT Abd/Pelvis  7/7/22 - CT Abd/Pelvis  6/8/22 - CT Abd/Pelvis  3/17/21 - CT Abd/Pelvis  7/19/20 - CT Abd/Pelvis  2/15/20 - CT Chest/Abd/Pelvis    [facility]:  6/29/19 - CT Abd/Pelvis  12/22/18 - CT Abd/Pelvis   MRI N/A    XRAY N/A    ULTRASOUND  (ENDOANAL/ENDORECTAL) Received / Internal [facility]:  7/14/22 - US Testicular/Scrotum  10/21/20 - US Renal    [facility]:  6/26/19 - US Testicle     Records Requested  08/15/23    Facility  [facility]  Fax: [phone]  [facility]  Fax: [phone]  [facility]  Fax: [phone]   Outcome * 8/15/23 12:36 PM Faxed req to SAMANTHA Quach, and [facility] for images and records to be sent to the clinic. - Sneha    * 8/23/23 3:56 PM Images received from [facility] and [facility] and attached to the patient in PACs. - Sneha    * 9/6/23 7:56 AM Faxed 2nd urg req to [facility] for records to be faxed to the clinic. - Sneha

## 2023-08-18 ENCOUNTER — TRANSFERRED RECORDS (OUTPATIENT)
Dept: HEALTH INFORMATION MANAGEMENT | Facility: CLINIC | Age: 48
End: 2023-08-18

## 2023-08-22 ENCOUNTER — VIRTUAL VISIT (OUTPATIENT)
Dept: PALLIATIVE MEDICINE | Facility: CLINIC | Age: 48
End: 2023-08-22
Payer: MEDICARE

## 2023-08-22 DIAGNOSIS — G57.10 MERALGIA PARESTHETICA, UNSPECIFIED LATERALITY: ICD-10-CM

## 2023-08-22 DIAGNOSIS — Z86.19 HISTORY OF SHINGLES: ICD-10-CM

## 2023-08-22 DIAGNOSIS — G57.12 MERALGIA PARESTHETICA, LEFT LOWER LIMB: ICD-10-CM

## 2023-08-22 DIAGNOSIS — E08.42 DIABETIC POLYNEUROPATHY ASSOCIATED WITH DIABETES MELLITUS DUE TO UNDERLYING CONDITION (H): ICD-10-CM

## 2023-08-22 DIAGNOSIS — M51.369 DDD (DEGENERATIVE DISC DISEASE), LUMBAR: ICD-10-CM

## 2023-08-22 DIAGNOSIS — G89.29 CHRONIC INTRACTABLE PAIN: Primary | ICD-10-CM

## 2023-08-22 DIAGNOSIS — M79.2 NEUROPATHIC PAIN: ICD-10-CM

## 2023-08-22 DIAGNOSIS — M45.8 ANKYLOSING SPONDYLITIS OF SACRAL REGION (H): ICD-10-CM

## 2023-08-22 DIAGNOSIS — M79.18 MYOFASCIAL MUSCLE PAIN: ICD-10-CM

## 2023-08-22 DIAGNOSIS — S31.809S GLUTEAL CLEFT WOUND, UNSPECIFIED LATERALITY, SEQUELA: ICD-10-CM

## 2023-08-22 PROCEDURE — 96159 HLTH BHV IVNTJ INDIV EA ADDL: CPT | Mod: 95 | Performed by: PSYCHOLOGIST

## 2023-08-22 PROCEDURE — 96158 HLTH BHV IVNTJ INDIV 1ST 30: CPT | Mod: 95 | Performed by: PSYCHOLOGIST

## 2023-08-22 NOTE — PROGRESS NOTES
Joel Pineda is a 48 year old male who is being evaluated via a billable video visit.      Patient is currently in the [facility]? yes    Patient would like the video invitation sent by: Text to cell phone: [phone]}    Video Start Time: 4:00 PM  Video Stop Time: 4:58 PM     Additional provider notes:     Pain Diagnoses per pain provider:   Chronic intractable pain     Myofascial muscle pain     Meralgia paresthetica, left lower limb     Ankylosing spondylitis of sacral region (H)     DDD (degenerative disc disease), lumbar     Diabetic polyneuropathy associated with diabetes mellitus due to underlying condition (H)     Cervical stenosis of spinal canal     History of shingles     Gluteal cleft wound, unspecified laterality, sequela     Neuropathic pain        DATA: During today's visit you reported the following: Your pain is about the same, bursa injection was helpful. Your mood is about the same. Your activity level is about the same. Your stress level is mildly worse - neighbor. Your sleep is stable. You reported engaging in self-care for your pain a couple times per day.    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 Discussed coping ahead, using radical acceptance and challenged negative self-talk. He seems to have good awareness into some self-defeating behaviors (e.g., over-engagement, avoidance, and negative self-talk coupled with catastrophic  thoughts). He is easily engaged.    PLAN:   Your next appointment is scheduled for 9/19 at 3:00 PM. You are on the waitlist for an appointment before 9/19, 10 am or later.  Assignment/Objectives /interventions for next session:   - focus on self-care  - try out cope ahead and continue to challenge negative self-talk    We believe regular attendance is key to your success in our program!    Any time you are unable to keep your appointment we ask that you call us at [phone] at least 24 hours in advance to cancel.This will allow us to offer the appointment time to another patient.   Multiple missed appointments may lead to dismissal from the clinic.    Telemedicine Visit: The patient's condition can be safely assessed and treated via synchronous audio and visual telemedicine encounter.      Reason for Telemedicine Visit: Services only offered telehealth    Originating Site (Patient Location): Patient's home    Distant Site (Provider Location): Provider Remote Setting- Home Office    Consent:  The patient/guardian has verbally consented to: the potential risks and benefits of telemedicine (video visit) versus in person care; bill my insurance or make self-payment for services provided; and responsibility for payment of non-covered services.     Mode of Communication:  Video Conference via [employer]    As the provider I attest to compliance with applicable laws and regulations related to telemedicine.      Shirley Bartlett PsyD LP  Licensed Psychologist  Outpatient Clinic Therapist  [facility]

## 2023-08-23 ENCOUNTER — MYC MEDICAL ADVICE (OUTPATIENT)
Dept: SLEEP MEDICINE | Facility: CLINIC | Age: 48
End: 2023-08-23
Payer: MEDICARE

## 2023-08-25 NOTE — TELEPHONE ENCOUNTER
This note from patient was related to his iodine reaction.      I will share this with the research team as there is a question regarding whether he can have iodine for the DATscan.         Hi Dr. Damico,     The contrast dye allergy has been around for years (I'm guessing it's related to numerous abdominal CT scans in the years leading up to a sigmoidectomy, as well as some spine scans).     At the ER, if dye is necessary for a CT, I'm pretreated with IV Benadryl and Steroid.     Reaction to dye has been flushing, hives, and intense itching; I've never been admitted to the hospital to manage the reaction after-the-fact.     Sincerely, Tito.

## 2023-08-28 ENCOUNTER — MYC MEDICAL ADVICE (OUTPATIENT)
Dept: PALLIATIVE MEDICINE | Facility: OTHER | Age: 48
End: 2023-08-28
Payer: MEDICARE

## 2023-08-28 DIAGNOSIS — M51.369 DDD (DEGENERATIVE DISC DISEASE), LUMBAR: ICD-10-CM

## 2023-08-28 DIAGNOSIS — M45.8 ANKYLOSING SPONDYLITIS OF SACRAL REGION (H): ICD-10-CM

## 2023-08-28 NOTE — TELEPHONE ENCOUNTER
Please process a refill of oxycodone 5 MG  to     [id number] - [place], [place] - [address]  [address]  [place] [address]  Phone: [phone] Fax: [phone]

## 2023-08-29 RX ORDER — OXYCODONE HYDROCHLORIDE 5 MG/1
5 TABLET ORAL 3 TIMES DAILY PRN
Qty: 50 TABLET | Refills: 0 | Status: SHIPPED | OUTPATIENT
Start: 2023-08-29 | End: 2023-09-13

## 2023-08-29 NOTE — TELEPHONE ENCOUNTER
Medication refill information reviewed.     Due date for oxyCODONE (ROXICODONE) 5 MG tablet is 08/29/23     Prescriptions prepped for review.     Will route to provider.

## 2023-08-29 NOTE — TELEPHONE ENCOUNTER
Received refill request for:    oxyCODONE (ROXICODONE) 5 MG tablet      Last dispensed from pharmacy on 7/21/2023.    Patient's last office/virtual visit by prescribing provider on 7/21/2023.    Next office/virtual appointment scheduled for 9/28/2023.    Last urine drug screen date 6/8/2023.    Current opioid agreement on file? Yes Date of opioid agreement: 6/8/2023.    E-prescribe to:     [id number] - [place], [place] - [address]    Will route to nursing [place] for review and preparation of prescription(s).

## 2023-08-30 ENCOUNTER — TRANSFERRED RECORDS (OUTPATIENT)
Dept: HEALTH INFORMATION MANAGEMENT | Facility: CLINIC | Age: 48
End: 2023-08-30

## 2023-09-05 ENCOUNTER — RADIOLOGY INJECTION OFFICE VISIT (OUTPATIENT)
Dept: PALLIATIVE MEDICINE | Facility: CLINIC | Age: 48
End: 2023-09-05
Attending: NURSE PRACTITIONER
Payer: MEDICARE

## 2023-09-05 VITALS — HEART RATE: 75 BPM | SYSTOLIC BLOOD PRESSURE: 111 MMHG | DIASTOLIC BLOOD PRESSURE: 74 MMHG

## 2023-09-05 DIAGNOSIS — G57.12 MERALGIA PARESTHETICA, LEFT LOWER LIMB: ICD-10-CM

## 2023-09-05 PROCEDURE — 64447 NJX AA&/STRD FEMORAL NRV IMG: CPT | Performed by: PAIN MEDICINE

## 2023-09-05 RX ORDER — BUPIVACAINE HYDROCHLORIDE 2.5 MG/ML
10 INJECTION, SOLUTION EPIDURAL; INFILTRATION; INTRACAUDAL ONCE
Status: COMPLETED | OUTPATIENT
Start: 2023-09-05 | End: 2023-09-05

## 2023-09-05 RX ORDER — TRIAMCINOLONE ACETONIDE 40 MG/ML
40 INJECTION, SUSPENSION INTRA-ARTICULAR; INTRAMUSCULAR ONCE
Status: COMPLETED | OUTPATIENT
Start: 2023-09-05 | End: 2023-09-05

## 2023-09-05 RX ORDER — TRIAMCINOLONE ACETONIDE 40 MG/ML
40 INJECTION, SUSPENSION INTRA-ARTICULAR; INTRAMUSCULAR ONCE
Status: DISCONTINUED | OUTPATIENT
Start: 2023-09-05 | End: 2023-09-05

## 2023-09-05 RX ADMIN — BUPIVACAINE HYDROCHLORIDE 25 MG: 2.5 INJECTION, SOLUTION EPIDURAL; INFILTRATION; INTRACAUDAL at 21:13

## 2023-09-05 RX ADMIN — TRIAMCINOLONE ACETONIDE 40 MG: 40 INJECTION, SUSPENSION INTRA-ARTICULAR; INTRAMUSCULAR at 15:41

## 2023-09-05 ASSESSMENT — PAIN SCALES - GENERAL: PAINLEVEL: MODERATE PAIN (5)

## 2023-09-05 NOTE — PROGRESS NOTES
Diagnoses and all orders for this visit:       Bilateral Meralgia paresthetica         Diagnoses post: Bilateral meralgia paresthetica  Current Procedure: Bilateral Lateral femoral cutaneous nerve block  Current Indication (include preoperative):  Alleviation of pain      REASON FOR REFERRAL: Anterior lateral thigh pain and burning  Sonographic guidance will be used to ensure accurate placement.  PATIENT EDUCATION:  Ready to learn with no apparent learning barriers identified.  Learning preferences include listening. Explained diagnosis and treatment plan as well as treatment alternatives. Patient expressed understanding of the content.  Following denial of allergy and review of potential side effects and complications including but not necessarily limited to infection, bleeding, allergic reaction, post-injection flare, local tissue breakdown, injury to soft tissue and/or nerves and seizure, patient indicated their understanding and agreed to proceed. A written consent was obtained and is scanned into the chart. Written and signed consent obtained and is scanned into the chart.  PROCEDURE:  Prior to the procedure,a 12 MHz linear transducer was used to visualize the abdominal/groin musculature to determine the approach for the procedure.  Procedure was carried out using sterile technique including Chloraprep scrub, a sterile transducer cover, and sterile transducer gel. A simple surgical tray was used.  PROCEDURAL PAUSE:  Procedural pause conducted to verify correct patient identity, procedure to be performed, and as applicable, correct side/site, correct patient position, availability of implants, special equipment, or special requirements.  Patient position:  Supine  Transducer type:  12 MHz linear array transducer  Approach:  Medial to lateral parallel to long axis of transducer  Local Anesthesia:  25 gauge 2.5 inch needle was used to anesthetize the skin, subcutaneous tissue  with 5 ml of 1% Lidocaine  Injection:  After confirming needle tip position, syringe was replaced with one containing 2 ml of 0.25% Bupivacaine and 40mg of kenalog  which was injected and seen hydodissecting the sartorius and tensor fascia florida on left side.  Needle was removed bandage placed over the wound.  AFTERCARE:  Patient tolerated the procedure without complication. After a short observation period, the patient was discharged under their own power and in excellent condition.  Pain noted to be a 8/10 before completion of the procedure and 0/10 after completion of the procedure.      Injection solution contained:  2ml of 0.25% bupivacaine and 40mg of kenalog              Bubba Montgomery MD  [facility] [place]

## 2023-09-05 NOTE — PATIENT INSTRUCTIONS
[facility]   Procedure Discharge Instructions    Today you saw:    Dr. Bubba Montgomery     You had an:  Epidural steroid injection   sacroiliac joint injection   facet joint injection  hip injection  piriformis injection     Medications used:  Lidocaine   Bupivacaine   Dexamethasone Omnipaque  Ropivicaine   Kenalog   Gadolinium  Normal saline        If you have received sedation before, during, or after your procedure, for the next 24 hours you shall NOT:   -Drive  -Operate machinery  -Drink alcohol  -Sign any legal documents      If you were holding your blood thinning medication, please restart taking it: N/A  Be cautious when walking. Numbness and/or weakness in the lower extremities may occur for up to 6-8 hours after the procedure due to effect of the local anesthetic  Do not drive for 6 hours. The effect of the local anesthetic could slow your reflexes.   You may resume your regular activities after 24 hours  Avoid strenuous activity for the first 24 hours  You may shower, however avoid swimming, tub baths or hot tubs for 24 hours following your procedure  You may have a mild to moderate increase in pain for several days following the injection.  It may take up to 14 days for the steroid medication to start working although you may feel the effect as early as a few days after the procedure.     You may use ice packs for 10-15 minutes, 3 to 4 times a day at the injection site for comfort  Do not use heat to painful areas for 6 to 8 hours. This will give the local anesthetic time to wear off and prevent you from accidentally burning your skin.   Unless you have been directed to avoid the use of anti-inflammatory medications (NSAIDS), you may use medications such as ibuprofen, Aleve or Tylenol for pain control if needed.   If you were fasting, you may resume your normal diet and medications after the procedure  If you have diabetes, check your blood sugar more frequently than usual  as your blood sugar may be higher than normal for 10-14 days following a steroid injection. Contact your doctor who manages your diabetes if your blood sugar is higher than usual  Possible side effects of steroids that you may experience include flushing, elevated blood pressure, increased appetite, mild headaches and restlessness.  All of these symptoms will get better with time.  If you experience any of the following, call the Pain Clinic during work hours (Mon-Friday 8-4:30 pm) at [phone] or the Provider Line after hours at [phone]:  -Fever over 100 degree F  -Swelling, bleeding, redness, drainage, warmth at the injection site  -Progressive weakness or numbness in your legs or arms  -Loss of bowel or bladder function  -Unusual headache that is not relieved by Tylenol or other pain reliever  -Unusual new onset of pain that is not improving

## 2023-09-06 ASSESSMENT — ASTHMA QUESTIONNAIRES: ACT_TOTALSCORE: 23

## 2023-09-06 NOTE — TELEPHONE ENCOUNTER
Received records from [facility]. P: [phone] F: [phone] Records have been scanned into the patient's chart for review.    Slime Szymanski LPN  Pulmonary Medicine:  [facility]  Phone: [phone] Fax: [phone]

## 2023-09-08 ENCOUNTER — TRANSFERRED RECORDS (OUTPATIENT)
Dept: HEALTH INFORMATION MANAGEMENT | Facility: CLINIC | Age: 48
End: 2023-09-08
Payer: MEDICARE

## 2023-09-12 ASSESSMENT — PATIENT HEALTH QUESTIONNAIRE - PHQ9
SUM OF ALL RESPONSES TO PHQ QUESTIONS 1-9: 12
SUM OF ALL RESPONSES TO PHQ QUESTIONS 1-9: 12
10. IF YOU CHECKED OFF ANY PROBLEMS, HOW DIFFICULT HAVE THESE PROBLEMS MADE IT FOR YOU TO DO YOUR WORK, TAKE CARE OF THINGS AT HOME, OR GET ALONG WITH OTHER PEOPLE: VERY DIFFICULT

## 2023-09-12 NOTE — PROGRESS NOTES
Rheumatology Clinic Visit  [facility]  Frantz Kaur M.D.     Joel Pineda MRN# [id number]   YOB: 1975 Age: 48 year old   Date of Visit: 2023  Primary care provider: Ksenia Lyles          Assessment and Plan:     # Ankylosing spondylitis  Patient with radiographic evidence of ankylosing spondylitis and positive response to TNF inhibitors since 2018. Patient states that his symptoms have been well-controlled on Enbrel for at least a couple of years with minimal side effects. Patient maintains good range of motion in the low back, with retained ability to reverse lordosis of the lumbar spine. He has no evidence of an active flare today.    Data: May 2023 CBC, CMP were normal  From: 17 ([facility]): neg DANIS, chromatin DNA, ribosomal P, SSA, SSB, SMRNP, Scl-70, Jo1, Centromere B, Barnes, RNP, DNA  From 11/22/10 ([facility]): nl IgG4  From 11/13/10 ([facility]): neg c-ANCA, p-ANCA,       Imagin-23 CT abdomen was normal.  Lumbar xray 2018: Moderate L5-S1 and mild L4-5 degenerative disc disease and degenerative facet arthropathy. No evidence for fracture, subluxation, or spondylolisthesis. Chronic bridging enthesophyte across the lower right SI joint with irregularity of the joint space suspicious for sequelae of chronic inflammatory sacroiliitis.    Discussion: Patient's ankylosing spondylitis is symptomatically well- controlled on Enbrel regimen. We will continue this. If he develops unacceptable side effects or Enbrel loses efficacy, will discuss alternative treatment options (IL-17 antagonism).     Plan:  1.  Continue etanercept 50 mg subcutaneous injected once weekly, to be prescribed through [facility] by pharmacy liaison.  Medication therapy management consultation placed.  2.  Creatinine, CBC every 6 months.  3.  If low back pain recurs/persists after planned radiofrequency ablation, and has inflammatory features, consider MRI of  sacroiliac joints for discerning degree of active inflammatory disease and a rationale for possible switch in immune modulating therapy.    # Degenerative disc disease, low back pain  Patient reports that his midline back pain gives him more problems than his SI pain. He is following with pain clinic for this. He has good results from prior RFA and is looking to do this again.     # Plantar fasciitis  # Achilles tendinopathy  Patient is going through PT for these issues and is currently following with podiatry. He is looking to get steroid injections for his plantar fascia soon. No signs of tendon inflammation on today's exam.     # Diverticulitis, s/p sigmoidectomy  # Microscopic colitis  # Gastroparesis  Patient reports that his gastroparesis symptoms are what currently give him the most trouble. He is currently following with GI for this.    # Type 2 diabetes  Patient's A1C 5.5 in June 2023 on Ozempic. He has also lost ~35lbs on this medication. Currently managing this with FP and with GI (due to gastroparesis).     RTC 6-7 months    Radha Adam, MS4    I was present with the medical student who took the history and acted as a scribe. I have verified the history, reviewed imaging and laboratory data, and personally performed the physical exam. I formulated the assessment and plan.Inflammatory low back pain is well controlled on longstanding etanercept.  Exam shows minimal restriction in cervical or lumbar spine range of motion, despite ankylosing spondylitis with probable symptom onset onset in the third decade.  I recommend continuing TNF antagonism; interleukin-17 antagonism represents a viable alternative treatment should etanercept lose efficacy.  Would avoid Gavino kinase inhibitors given history of diverticulitis and colitis.    Frantz Kaur M.D.  Staff Rheumatologist, [facility]  Pager [phone]    On the day of the encounter, a total of 63 minutes was spent in chart review, and in counseling and  coordination of care, regarding the patient's complex medical problem of ankylosing spondylitis, tendonopathy, gastroparesis, and diabetes.           History of Present Illness:   Joel Pineda presents for evaluation  of joint pain. Referred by Dr. Lyles.    Previously seen by Dr. Baxter. Per note from 10/19/22: patient had many years of inflammatory back pain prior to having a lumbar spine x-ray on 2/23/2018 at [facility] showing DDD, degenerative face arthropathy, and chronic bridging enthesophyte across the lower right SI joint with irregularity of the joint space suspicious for sequelae of chronic inflammatory sacroiliitis. Per chart review, he is HLA B27 positive, but primary lab report is not available. Patient had a history of diverticulitis requiring sigmoidectomy and reportedly his mother has ulcerative colitis.     Diagnosis of ankylosing spondylitis made base on these factors. Treatment history is as follows:  - Remicade was started with improvement of lower back pain and stiffness, but also associated with increased infections.  - Simponi was associated with hives, nausea, dizziness, and drowsiness   - Humira effective but associated with a sensation of burning on his tongue, and longstanding nausea  - Enbrel was started and patient has responded well, with worsening arthritis symptoms each time Enbrel has been held    Previously with mild right Achilles tendon pain that has since resolved with PT exercises. Patient additionally had back pain more c/w degenerative back pain.  He was following with the pain management clinic for the degenerative low back pain.     Plan at that visit was to continue Enbrel 50mg SQ every 7 days        Initial history September 12, 2023  Patient has a long history of low back. He recalls having to miss work a few times in his 20s due to pain. He had a microdiscectomy as far back as 2008 at L5-S1. He was known to carry HLA B27, but not diagnosed until 2018 when xray  revealed signs of chronic inflammation and eburnation in the R SI joint. He has been on several medications that were effective, but had side effects including hives. He has bene on Enbrel for at least 4 years and feels like it is working. He notes that when he had to come off of his Enbrel in 2019 after he had a procedure for his hidradenitis suppurativa and recurrent Shingles (now on prophylactic Valtrex), he had significantly more pain. He continues to have midline back pain managed by the pain clinic. He is hoping to have another round of RFA soon to help manage these symptoms. Back pain is 2/10 today.     Patient has a number of other medical conditions. He has neck pain he attributes to injuries sustained while sleep walking. He has been through several round of PT and now seeing podiatry for plantar fasciitis and Achilles tendinopathy. He had a sigmoidectomy in 2013 due to diverticulitis that recurred roughly every year. Since then, he has only had 2 flares of diverticulitis. He is now following GI for gastroparesis. Other past medical conditions include asthma, type 2 diabetes (A1C 5.5 on Ozempic), hyperlipidemia/hypertriglyceridemia, essential hypertension, POTS/autonomic dysfunction, hypothyroidism, LLOYD, REM sleep behavior disorder, anxiety, and depression.            Review of Systems:     Constitutional: negative  Skin: hx of hidradenitis suppurativa and recurrent shingles   Eyes: dry eyes requiring artificial tears; no history of uveitis  Ears/Nose/Throat: No hx of mucosal lesions  Respiratory: No shortness of breath, dyspnea on exertion, cough  Gastrointestinal: Hx of diverticulitis and sigmoidectomy per HPI; hx of gastroparesis, stool retention, and bowel incontinence; no blood in the stools or black, tarry stools  Genitourinary: No history of urinary incontinence and   Musculoskeletal: Per HPI  Neurologic: Some numbness in things 2/2 nerve block injection; hx of radiculopathy; no  weakness  Psychiatric: Hx of psychosis said to be 2/2 autoimmune condition, now controlled         Active Problem List:     Patient Active Problem List    Diagnosis Date Noted    Hyperlipidemia LDL goal <70 04/06/2023     Priority: Medium    RBD (REM behavioral disorder) 09/30/2022     Priority: Medium    Small fiber neuropathy 09/12/2022     Priority: Medium    Folliculitis 12/31/2021     Priority: Medium    Immunosuppression (H) 12/31/2021     Priority: Medium    MDD (major depressive disorder), recurrent severe, without psychosis (H) 12/22/2021     Priority: Medium    Borderline personality disorder (H) 11/17/2021     Priority: Medium    Chronic pain syndrome 07/12/2021     Priority: Medium    Dysautonomia (H) 08/18/2020     Priority: Medium    PHN (postherpetic neuralgia) 07/15/2020     Priority: Medium    POTS (postural orthostatic tachycardia syndrome) 03/24/2020     Priority: Medium    Orthostatic dizziness 03/18/2020     Priority: Medium    Gastroparesis 01/24/2020     Priority: Medium    Hypertriglyceridemia 12/16/2019     Priority: Medium    Ingrown toenail 07/03/2019     Priority: Medium    History of pulmonary embolism 01/28/2019     Priority: Medium    Peripheral polyneuropathy 01/23/2019     Priority: Medium    Type 2 diabetes mellitus with complication, without long-term current use of insulin (H) 12/24/2018     Priority: Medium    DDD (degenerative disc disease), lumbar 11/13/2018     Priority: Medium    Morbid obesity (H) 05/17/2018     Priority: Medium    Fatty infiltration of liver 05/17/2018     Priority: Medium    Ankylosing spondylitis of sacral region (H) 02/28/2018     Priority: Medium    Chronic, continuous use of opioids 04/04/2017     Priority: Medium     Patient is followed by Ksenia Lyles MD for ongoing prescription of pain medication.  All refills should only be approved by this provider, or covering partner.    Medication(s): oxy 5.   Maximum quantity per month: 40  Clinic  visit frequency required: Q3  months     Controlled substance agreement: 6/23/2020  UDS: Aug 2019    CSA -- Encounter Level on 04/04/2017:    Controlled Substance Agreement - Scan on 4/11/2017  1:30 PM: CONTROLLED SUBSTANCE AGREEMENT       CSA -- Patient Level:    Controlled Substance Agreement - Opioid - Scan on 8/9/2021  2:05 PM  Controlled Substance Agreement - Opioid - Scan on 6/23/2020  3:50 PM  Controlled Substance Agreement - Opioid - Scan on 6/12/2019  6:16 PM     Pain Clinic evaluation in the past: Yes    DIRE Total Score(s):  No flowsheet data found.    Last [facility] website verification:  12/2/2020   [email or web address]       Essential hypertension with goal blood pressure less than 140/90 11/01/2016     Priority: Medium    B12 deficiency 08/29/2016     Priority: Medium    Irritable bowel syndrome with diarrhea 08/19/2016     Priority: Medium    Chronic midline low back pain without sciatica 06/06/2016     Priority: Medium    Acquired hypothyroidism 10/08/2015     Priority: Medium    Facet arthritis of cervical region 10/06/2015     Priority: Medium    Intracranial arachnoid cyst 10/02/2015     Priority: Medium    LLOYD (obstructive sleep apnea)- mild (AHI 11) 01/31/2015     Priority: Medium     Study Date: 1/27/2015- (308.1 lbs)  Snoring was reported assoft to moderate.  Lowest oxygen saturation was 88.0%. Apnea/Hypopnea Index was 11.5 events per hour. REM was not seen.  The supine AHI is 12.7.  RDI was  25.7. PLM index was 0 per hour.  Sleep study 10/31/16 [facility] (292#) CPAP 8 cm effective      Generalized anxiety disorder 01/12/2015     Priority: Medium    GERD (gastroesophageal reflux disease)      Priority: Medium    Chronic nonallergic rhinitis      Priority: Medium     RAST all NEGATIVE for environmental allergens, IgE= 6 (normal)      Intermittent asthma      Priority: Medium     Exercise-induced      Diverticulosis 09/01/2006     Priority: Medium     Recurrent diverticulitis, S/p sigmoid  resction 8/2013              Past Medical History:     Past Medical History:   Diagnosis Date    Acne     Acquired hypothyroidism     Allergic state     Ankylosing spondylitis lumbar region (H)     Ankylosing spondylitis of sacral region (H)     Anxiety     Bipolar 2 disorder (H)     Chest pain     Chest pain, regulated w/BP meds. Clear arteries.    Chronic pain     DDD (degenerative disc disease), lumbar     Depressive disorder     Diabetes (H)     Diverticulosis     Facet arthritis of cervical region     Gastroesophageal reflux disease     Hypertension     IBS (irritable bowel syndrome)     Intracranial arachnoid cyst     Major depressive disorder, recurrent episode (H)     Multiple psych providers - they manage meds August 2015: Provigil induced severe mood dis-function     Major depressive disorder, recurrent episode, severe (H) 12/2/2020    MDD (major depressive disorder), recurrent severe, without psychosis (H) 7/21/2021    Mixed dyslipidemia 4/6/2023    LLOYD (obstructive sleep apnea)- mild (AHI 11)     Polyneuropathy     Pulmonary embolism (H)     Skin exam, screening for cancer 12/3/2013    Sleep apnea     Uncomplicated asthma      Past Surgical History:   Procedure Laterality Date    BACK SURGERY  10/2007    lumbar discectomy L5-S1    BIOPSY  09/15/2020    Colon Adenomas x2    COLONOSCOPY      Note: colonoscopy scheduled with [facility] on Friday, 9/4/15    COSMETIC SURGERY  2012    Nose Exterior - functional    GI SURGERY  08/2013    Sigmoidectomy    HERNIA REPAIR, UMBILICAL  08/23/2011    smallDr. Evan    INCISION AND DRAINAGE, ABSCESS, COMPLEX  08/23/2011    umbilical, Dr. Evan Beavers    LAPAROSCOPIC ASSISTED COLECTOMY LEFT (DESCENDING)  08/15/2013    Procedure: LAPAROSCOPIC ASSISTED COLECTOMY LEFT (DESCENDING);  Laparoscopic Hand Assisted Sigmoid Resection, Mobilization of Splenic Fissure, coloproctoscopy, *Latex Free Room* Anesthesia General with Pain block  ;  Surgeon: Aurora Justice MD;   "Location: [facility] OR    NERVE SURGERY  08/18/2011    RF ablation @ L3-S1 @ [facility]    RECONSTRUCT NOSE AND SEPTUM (FUNCTIONAL)  10/14/2011    Procedure:RECONSTRUCT NOSE AND SEPTUM (FUNCTIONAL); Functional Septorhinoplasty, Turbinate Reduction, ; Surgeon:CEDRIC CUEVAS; Location:[facility] OR    SINUS SURGERY  10/01/2001    ethmoidectomy chronic sinusitis    SOFT TISSUE SURGERY  4/7/2022    Hidradenitis Suppurativa surgery            Social History:     Social History     Socioeconomic History    Marital status: Single     Spouse name: Not on file    Number of children: Not on file    Years of education: Not on file    Highest education level: Not on file   Occupational History    Occupation:      Employer: [employer]    Occupation: paraprofessional     Comment: [employer]     Employer: [employer]   Tobacco Use    Smoking status: Never    Smokeless tobacco: Never   Vaping Use    Vaping Use: Never used   Substance and Sexual Activity    Alcohol use: Not Currently     Comment: occ 1/week    Drug use: No     Comment: synthetic THC for nausea    Sexual activity: Not Currently     Partners: Female     Birth control/protection: Condom, Pill   Other Topics Concern    Parent/sibling w/ CABG, MI or angioplasty before 65F 55M? Yes     Comment: 49yo brother survived a \"massive\" PE several months ago     Service Not Asked    Blood Transfusions Not Asked    Caffeine Concern Not Asked    Occupational Exposure Not Asked    Hobby Hazards Not Asked    Sleep Concern Yes    Stress Concern Yes    Weight Concern Not Asked    Special Diet Not Asked    Back Care Yes    Exercise Not Asked    Bike Helmet Not Asked    Seat Belt Yes    Self-Exams Not Asked   Social History Narrative    Not on file     Social Determinants of Health     Financial Resource Strain: Not on file   Food Insecurity: Not on file   Transportation Needs: Not on file   Physical Activity: Not on file   Stress: Not on file   Social Connections: Not on file " "  Intimate Partner Violence: Not At Risk (4/22/2022)    Humiliation, Afraid, Rape, and Kick questionnaire     Fear of Current or Ex-Partner: No     Emotionally Abused: No     Physically Abused: No     Sexually Abused: No   Housing Stability: Not on file          Family History:     Family History   Problem Relation Age of Onset    Musculoskeletal Disorder Mother         back    Anxiety Disorder Mother     Colon Polyps Mother     Ulcerative Colitis Mother         and ischemic small intestine, surgery    Hypertension Mother     Breast Cancer Mother     Osteoporosis Mother     Diabetes Mother         Type 2, Diagnosed in 2014    Depression Mother         Takes Cymbalta to help with chronic pain + depx    Thyroid Disease Mother         Hypothyroidism    Obesity Mother         Under much better control latter half of 2015    Musculoskeletal Disorder Father         back    Substance Abuse Father         Alcohol    Hypertension Father     Hyperlipidemia Father     Depression Father         Off meds for many years. Seems \"ok\"    Anxiety Disorder Father     Heart Disease Maternal Grandmother     Heart Disease Maternal Grandfather     Psychotic Disorder Paternal Grandfather     Suicide Paternal Grandfather     Depression Paternal Grandfather         Pediatrician. Committed suicide by pistol in 1990.    Musculoskeletal Disorder Brother         back    Depression Brother         Expressed as anger and moodiness    Substance Abuse Brother         Alcohol    Anxiety Disorder Brother     Substance Abuse Sister         Alcohol    Depression Sister         Mental Health Therapist, yet no anti-depressants?    Anxiety Disorder Sister         Mental Health Therapist, yet no anti-anxiety meds?    Other Cancer Other         Bladder    Substance Abuse Brother     Colon Cancer No family hx of     Crohn's Disease No family hx of     Anesthesia Reaction No family hx of     Cancer No family hx of         No family history of skin cancer " "           Allergies:     Allergies   Allergen Reactions    Amoxicillin-Pot Clavulanate Difficulty breathing    Banana Shortness Of Breath     Pt reports organic Banana is okay.     Nitroglycerin Palpitations    Penicillins Anaphylaxis    Provigil [Modafinil] Shortness Of Breath     headache    Gadolinium Hives and Itching     Patient was premedicated for the contrast allergy. He did still have a reaction a few hours after injection. Hives and itching. Dr. Gomez told tech to inform pt he should only have contrast again in the future when premedicated and at a hospital. Not at an outpatient facility.     Influenza Virus Vaccine Rash     Quadrivalent, cell based    Ketoconazole      Topical cream caused swelling and itching    Dye [Contrast Dye] Other (See Comments) and Hives     Moderate flushing, CT contrast    Gabapentin      Other reaction(s): hives    Golimumab      Hives, bradycardia, face swelling    Naproxen      Other reaction(s): Bleeding Gums    Neurontin [Gabapentin] Hives     Moderate hives    Nortriptyline Hives    Nystatin Hives    Varicella Virus Vaccine Live      Rash    Adhesive Tape Rash    Baclofen Other (See Comments)     Cognitive changes    Flagyl [Metronidazole Hcl] Palpitations and Hives    Latex Rash    Metronidazole Palpitations, Other (See Comments) and Rash     dizziness (versus ciprofloxacin taken at same time)            Medications:     Current Outpatient Medications   Medication Sig Dispense Refill    acetaminophen 500 MG CAPS Take 1,000 mg by mouth 3 times daily 60 capsule     albuterol (PROAIR HFA/PROVENTIL HFA/VENTOLIN HFA) 108 (90 Base) MCG/ACT inhaler INHALE 2 PUFFS BY MOUTH EVERY 4 HOURS AS NEEDED FOR SHORTNESS OF BREATH, DYSPNEA, OR WHEEZING 25.5 g 3    aspirin (ASA) 81 MG tablet Take 81 mg by mouth daily       B-D LUER-LUCILLE SYRINGE 25G X 1\" 3 ML MISC USE WITH B12 INJECTIONS 12 each 0    buPROPion (WELLBUTRIN XL) 300 MG 24 hr tablet Take 1 tablet by mouth daily      " cholecalciferol (D3-50) 1250 mcg (13305 units) capsule TAKE ONE CAPSULE BY MOUTH EVERY 2 WEEKS 24 capsule 0    clonazePAM (KLONOPIN) 0.5 MG tablet Take 1 mg by mouth At Bedtime For RBD      cyanocobalamin (CYANOCOBALAMIN) 1000 MCG/ML injection INJECT 1 ML INTO THE MUSCLE EVERY 30 DAYS 10 mL 0    DULoxetine (CYMBALTA) 60 MG capsule Take 120 mg by mouth daily       EPINEPHrine (ANY BX GENERIC EQUIV) 0.3 MG/0.3ML injection 2-pack Inject 0.3 mLs (0.3 mg) into the muscle once as needed for anaphylaxis 0.6 mL 3    eszopiclone (LUNESTA) 3 MG tablet Take 3 mg by mouth At Bedtime       etanercept (ENBREL SURECLICK) 50 MG/ML autoinjector Inject 50 mg Subcutaneous once a week . Hold for signs of infection, and seek medical attention. 4 mL 7    famotidine (PEPCID) 20 MG tablet Take 20 mg by mouth daily      fenofibrate (TRICOR) 48 MG tablet TAKE ONE TABLET BY MOUTH ONCE DAILY 90 tablet 0    fluticasone-salmeterol (ADVAIR-HFA) 45-21 MCG/ACT inhaler Inhale 2 puffs into the lungs 2 times daily      levothyroxine (SYNTHROID/LEVOTHROID) 75 MCG tablet TAKE ONE TABLET BY MOUTH EVERY MORNING 90 tablet 1    lidocaine (XYLOCAINE) 5 % external ointment Apply topically 2 times daily as needed for moderate pain 50 g 3    lidocaine-prilocaine (EMLA) 2.5-2.5 % external cream Apply topically as needed for moderate pain 60 g 3    melatonin 3 MG tablet Take 13 mg by mouth nightly as needed       methocarbamol (ROBAXIN) 500 MG tablet TAKE 1 - 2 TABLETS BY MOUTH 4 TIMES DAILY AS NEEDED FOR MUSCLE SPASMS 240 tablet 1    metoclopramide (REGLAN) 5 MG tablet Take 5 mg by mouth 4 times daily as needed      metoprolol succinate ER (TOPROL XL) 200 MG 24 hr tablet Take 1 tablet (200 mg) by mouth 2 times daily 180 tablet 1    montelukast (SINGULAIR) 10 MG tablet TAKE ONE TABLET BY MOUTH EVERY EVENING 90 tablet 3    nabumetone (RELAFEN) 500 MG tablet Take 1-2 tablets (500-1,000 mg) by mouth 2 times daily as needed for moderate pain 120 tablet 1    naloxegol  (MOVANTIK) 12.5 MG TABS tablet       naloxone (NARCAN) 4 MG/0.1ML nasal spray Spray 1 spray (4 mg) into one nostril alternating nostrils once as needed for opioid reversal every 2-3 minutes until assistance arrives 0.2 mL 0    olopatadine (PATADAY) 0.2 % ophthalmic solution Place 1 drop into both eyes daily 2.5 mL 3    omega-3 acid ethyl esters (LOVAZA) 1 g capsule TAKE TWO CAPSULES BY MOUTH TWICE DAILY 360 capsule 0    omeprazole (PRILOSEC) 20 MG DR capsule Take 20 mg by mouth as needed      oxyCODONE (ROXICODONE) 5 MG tablet Take 1 tablet (5 mg) by mouth 3 times daily as needed for pain (Max 3 tabs/day) Profile Rx: patient will contact pharmacy when needed 50 tablet 0    pregabalin (LYRICA) 150 MG capsule Take 1 capsule (150 mg) by mouth 3 times daily 270 capsule 1    pseudoePHEDrine-guaiFENesin (MUCINEX D)  MG 12 hr tablet Take 1 tablet by mouth every 12 hours      pyridostigmine (MESTINON) 60 MG tablet Take 1 tablet by mouth 3 times daily      ramipril (ALTACE) 10 MG capsule TAKE ONE CAPSULE BY MOUTH ONCE DAILY 90 capsule 1    risperiDONE (RISPERDAL) 0.5 MG tablet Take 0.5 mg by mouth 2 times daily as needed      rizatriptan (MAXALT) 10 MG tablet Take 1 tablet (10 mg) by mouth at onset of headache for migraine May repeat in 2 hours. Max 3 tablets/24 hours. 30 tablet 1    rosuvastatin (CRESTOR) 40 MG tablet Take 1 tablet (40 mg) by mouth daily 90 tablet 1    Semaglutide, 1 MG/DOSE, 4 MG/3ML SOPN Inject 1 mg Subcutaneous once a week 3 mL 11    sodium fluoride 1.1 % CREA At Bedtime      spacer (OPTICHAMBER JOHNNY) holding chamber To be used in conjunction with albuterol (PROAIR HFA/PROVENTIL HFA/VENTOLIN HFA) 108 (90 Base) MCG/ACT inhaler 1 each 0    valACYclovir (VALTREX) 500 MG tablet Take 1 tablet (500 mg) by mouth daily 90 tablet 3    vitamin B complex with vitamin C (STRESS TAB) tablet Take 1 tablet by mouth daily      ZINC SULFATE-VITAMIN C MT Take 1 tablet by mouth daily      dronabinol (MARINOL) 2.5  "MG capsule Take 1 capsule (2.5 mg) by mouth 3 times daily as needed (pain) (Patient not taking: Reported on 9/13/2023) 90 capsule 0            Physical Exam:   Blood pressure 107/73, pulse 78, resp. rate 16, height 1.93 m (6' 4\"), weight 132 kg (291 lb), SpO2 95 %.  Wt Readings from Last 6 Encounters:   09/14/23 132 kg (291 lb)   09/13/23 131.1 kg (289 lb)   06/14/23 134.2 kg (295 lb 14.4 oz)   06/08/23 132 kg (291 lb)   06/05/23 132 kg (291 lb)   05/05/23 136.1 kg (300 lb)     Body mass index is 35.42 kg/m .  Constitutional: well-developed, appearing stated age; cooperative  Eyes: nl EOM, PERRLA, conjunctiva, sclera  ENT: nl external ears, nose, hearing, lips, teeth, gums, throat; no mucous membrane lesions, normal saliva pool  Neck: no visible mass or thyroid enlargement  Resp: normal effort at rest  CV: no edema  Lymph: no cervical, supraclavicular, inguinal or epitrochlear nodes  MS: Limited spinal mobility on side bend (L>R). Able to touch toes on forward bend and reverse lumbar lordosis. Heels and occiput to wall with minimal difficulty. The neck, shoulder, elbow, wrist, MCP/PIP/DIP, knee, ankle, and foot MTP/IP joints were examined and found normal. No active synovitis or altered joint anatomy. Normal  strength. No dactylitis, tenosynovitis, enthesopathy. No current tenderness of the plantar fascia.   Skin: no nail pitting, alopecia, rash, nodules or lesions  Neuro: grossly nl cranial nerves; voice is hoarse   Psych: nl judgement, orientation, memory, affect.         Data:     @      Latest Ref Rng & Units 10/19/2022    11:37 AM 2/15/2023     8:33 AM 5/5/2023     2:48 PM   RHEUM RESULTS   Albumin 3.4 - 5.0 g/dL 4.4      Albumin 3.5 - 5.2 g/dL   4.7    ALT 10 - 50 U/L 31   25    AST 10 - 50 U/L 30   31    Creatinine 0.67 - 1.17 mg/dL  0.94  1.01    CRP Inflammation 0.0 - 8.0 mg/L <2.9      GFR Estimate >60 mL/min/1.73m2  >90  >90    Hematocrit 40.0 - 53.0 % 46.5   44.9    Hemoglobin 13.3 - 17.7 g/dL 15.6   " 15.3    WBC 4.0 - 11.0 10e3/uL 8.5   9.3    RBC Count 4.40 - 5.90 10e6/uL 4.89   4.74    RDW 10.0 - 15.0 % 12.8   12.2    MCHC 31.5 - 36.5 g/dL 33.5   34.1    MCV 78 - 100 fL 95   95    Platelet Count 150 - 450 10e3/uL 297   240    Sed Rate 0 - 15 mm/hr 5      Quantiferon-TB Gold Plus Result Negative Negative          Rheumatoid Factor   Date Value Ref Range Status   08/07/2015 <20 <20 IU/mL Final   ,  ,  ,   Cyclic Citrullinated Peptide IgG   Date Value Ref Range Status   08/07/2009 <2  Interpretation:  Negative <5 U/mL Final   ,  ,  ,  ,   DANIS Screen by EIA   Date Value Ref Range Status   09/03/2014 <1.0  Interpretation:  Negative   <1.0 Final   ,  ,  ,  ,  ,  ,  ,   Hepatitis B Core Cindy   Date Value Ref Range Status   02/28/2018 Nonreactive NR^Nonreactive Final   ,   Hep B Surface Agn   Date Value Ref Range Status   02/28/2018 Nonreactive NR^Nonreactive Final   ,  ,  ,  ,   Quantiferon-TB Gold Plus   Date Value Ref Range Status   10/19/2022 Negative Negative Final     Comment:     No interferon gamma response to M.tuberculosis antigens was detected. Infection with M.tuberculosis is unlikely, however a single negative result does not exclude infection. In patients at high risk for infection, a second test should be considered in accordance with the 2017 ATS/IDSA/CDC Clinical Pract  ice Guidelines for Diagnosis of Tuberculosis in Adults and Children      TB1 Ag minus Nil Value   Date Value Ref Range Status   10/19/2022 0.00 IU/mL Final     TB2 Ag minus Nil Value   Date Value Ref Range Status   10/19/2022 0.00 IU/mL Final     Mitogen minus Nil Result   Date Value Ref Range Status   10/19/2022 9.96 IU/mL Final     Nil Result   Date Value Ref Range Status   10/19/2022 0.04 IU/mL Final   ,  ,  ,  ,  ,  ,  ,  ,   Proteinase 3 Antibody IgG   Date Value Ref Range Status   04/03/2019 <0.2 0.0 - 0.9 AI Final     Comment:     Negative  Antibody index (AI) values reflect qualitative changes in antibody   concentration that  cannot be directly associated with clinical condition or   disease state.     ,   Myeloperoxidase Antibody IgG   Date Value Ref Range Status   04/03/2019 <0.2 0.0 - 0.9 AI Final     Comment:     Negative  Antibody index (AI) values reflect qualitative changes in antibody   concentration that cannot be directly associated with clinical condition or   disease state.     ,  ,  ,  ,  ,  ,  ,   Albumin Fraction   Date Value Ref Range Status   02/22/2019 4.7 3.7 - 5.1 g/dL Final     Alpha 2 Fraction   Date Value Ref Range Status   02/22/2019 0.6 0.5 - 0.9 g/dL Final     Beta Fraction   Date Value Ref Range Status   02/22/2019 0.9 0.6 - 1.0 g/dL Final     Gamma Fraction   Date Value Ref Range Status   02/22/2019 0.9 0.7 - 1.6 g/dL Final     Monoclonal Peak   Date Value Ref Range Status   02/22/2019 0.0 0.0 g/dL Final     ELP Interpretation:   Date Value Ref Range Status   02/22/2019   Final    Essentially normal electrophoretic pattern. No monoclonal protein seen. Pathologic   significance requires clinical correlation.  JOYCE Smith M.D., Ph.D., Pathologist.         ,  ,   IGA   Date Value Ref Range Status   07/11/2016 188 70 - 380 mg/dL Final   ,  ,  ,  ,  ,  ,  ,

## 2023-09-13 ENCOUNTER — OFFICE VISIT (OUTPATIENT)
Dept: FAMILY MEDICINE | Facility: CLINIC | Age: 48
End: 2023-09-13
Payer: MEDICARE

## 2023-09-13 VITALS
HEART RATE: 82 BPM | RESPIRATION RATE: 18 BRPM | SYSTOLIC BLOOD PRESSURE: 114 MMHG | BODY MASS INDEX: 35.19 KG/M2 | DIASTOLIC BLOOD PRESSURE: 77 MMHG | TEMPERATURE: 99.1 F | HEIGHT: 76 IN | OXYGEN SATURATION: 96 % | WEIGHT: 289 LBS

## 2023-09-13 DIAGNOSIS — M51.369 DDD (DEGENERATIVE DISC DISEASE), LUMBAR: ICD-10-CM

## 2023-09-13 DIAGNOSIS — F11.90 CHRONIC, CONTINUOUS USE OF OPIOIDS: ICD-10-CM

## 2023-09-13 DIAGNOSIS — E66.01 MORBID OBESITY (H): ICD-10-CM

## 2023-09-13 DIAGNOSIS — I10 ESSENTIAL HYPERTENSION WITH GOAL BLOOD PRESSURE LESS THAN 140/90: ICD-10-CM

## 2023-09-13 DIAGNOSIS — M72.2 PLANTAR FASCIITIS: ICD-10-CM

## 2023-09-13 DIAGNOSIS — E11.8 TYPE 2 DIABETES MELLITUS WITH COMPLICATION, WITHOUT LONG-TERM CURRENT USE OF INSULIN (H): Primary | ICD-10-CM

## 2023-09-13 DIAGNOSIS — M45.8 ANKYLOSING SPONDYLITIS OF SACRAL REGION (H): ICD-10-CM

## 2023-09-13 DIAGNOSIS — K76.0 FATTY INFILTRATION OF LIVER: ICD-10-CM

## 2023-09-13 DIAGNOSIS — B02.9 HERPES ZOSTER WITHOUT COMPLICATION: ICD-10-CM

## 2023-09-13 DIAGNOSIS — E78.1 HYPERTRIGLYCERIDEMIA: ICD-10-CM

## 2023-09-13 DIAGNOSIS — E03.9 ACQUIRED HYPOTHYROIDISM: ICD-10-CM

## 2023-09-13 DIAGNOSIS — Z12.5 PROSTATE CANCER SCREENING: ICD-10-CM

## 2023-09-13 DIAGNOSIS — E78.5 HYPERLIPIDEMIA LDL GOAL <70: ICD-10-CM

## 2023-09-13 PROCEDURE — 99214 OFFICE O/P EST MOD 30 MIN: CPT | Performed by: FAMILY MEDICINE

## 2023-09-13 RX ORDER — ROSUVASTATIN CALCIUM 40 MG/1
40 TABLET, COATED ORAL DAILY
Qty: 90 TABLET | Refills: 1 | Status: SHIPPED | OUTPATIENT
Start: 2023-09-13 | End: 2024-04-15

## 2023-09-13 RX ORDER — VALACYCLOVIR HYDROCHLORIDE 500 MG/1
500 TABLET, FILM COATED ORAL DAILY
Qty: 90 TABLET | Refills: 3 | Status: SHIPPED | OUTPATIENT
Start: 2023-09-13

## 2023-09-13 RX ORDER — METOPROLOL SUCCINATE 200 MG/1
200 TABLET, EXTENDED RELEASE ORAL 2 TIMES DAILY
Qty: 180 TABLET | Refills: 1 | Status: SHIPPED | OUTPATIENT
Start: 2023-09-13 | End: 2024-06-10

## 2023-09-13 RX ORDER — OXYCODONE HYDROCHLORIDE 5 MG/1
5 TABLET ORAL 3 TIMES DAILY PRN
Qty: 50 TABLET | Refills: 0 | Status: SHIPPED | OUTPATIENT
Start: 2023-09-13 | End: 2023-09-28

## 2023-09-13 RX ORDER — LEVOTHYROXINE SODIUM 75 UG/1
TABLET ORAL
Qty: 90 TABLET | Refills: 1 | Status: SHIPPED | OUTPATIENT
Start: 2023-09-13 | End: 2024-03-25

## 2023-09-13 ASSESSMENT — ANXIETY QUESTIONNAIRES
2. NOT BEING ABLE TO STOP OR CONTROL WORRYING: MORE THAN HALF THE DAYS
1. FEELING NERVOUS, ANXIOUS, OR ON EDGE: MORE THAN HALF THE DAYS
6. BECOMING EASILY ANNOYED OR IRRITABLE: SEVERAL DAYS
3. WORRYING TOO MUCH ABOUT DIFFERENT THINGS: MORE THAN HALF THE DAYS
GAD7 TOTAL SCORE: 11
5. BEING SO RESTLESS THAT IT IS HARD TO SIT STILL: SEVERAL DAYS
GAD7 TOTAL SCORE: 11
IF YOU CHECKED OFF ANY PROBLEMS ON THIS QUESTIONNAIRE, HOW DIFFICULT HAVE THESE PROBLEMS MADE IT FOR YOU TO DO YOUR WORK, TAKE CARE OF THINGS AT HOME, OR GET ALONG WITH OTHER PEOPLE: VERY DIFFICULT
4. TROUBLE RELAXING: MORE THAN HALF THE DAYS
7. FEELING AFRAID AS IF SOMETHING AWFUL MIGHT HAPPEN: SEVERAL DAYS

## 2023-09-13 ASSESSMENT — PAIN SCALES - GENERAL: PAINLEVEL: MODERATE PAIN (4)

## 2023-09-13 NOTE — PROGRESS NOTES
Assessment & Plan     Type 2 diabetes mellitus with complication, without long-term current use of insulin (H)  Last A1c well controlled.  Seeing Dr. Cuenca in mid December and we will piggyback some lab work.  - Albumin Random Urine Quantitative with Creat Ratio; Future    Hyperlipidemia LDL goal <70  Well-controlled on current regimen.  Recheck in a few months.  - Lipid panel reflex to direct LDL Fasting; Future    Essential hypertension with goal blood pressure less than 140/90  Stable on current regimen.  Continue same plan and routine follow-up.   - metoprolol succinate ER (TOPROL XL) 200 MG 24 hr tablet; Take 1 tablet (200 mg) by mouth 2 times daily    Hypertriglyceridemia  Stable on current regimen.  Continue same plan and routine follow-up.   - rosuvastatin (CRESTOR) 40 MG tablet; Take 1 tablet (40 mg) by mouth daily    Fatty infiltration of liver  Continue to follow    Acquired hypothyroidism  Continue to follow  - levothyroxine (SYNTHROID/LEVOTHROID) 75 MCG tablet; TAKE ONE TABLET BY MOUTH EVERY MORNING    DDD (degenerative disc disease), lumbar  Stable and up-to-date on routine monitoring  - oxyCODONE (ROXICODONE) 5 MG tablet; Take 1 tablet (5 mg) by mouth 3 times daily as needed for pain (Max 3 tabs/day) Profile Rx: patient will contact pharmacy when needed    Ankylosing spondylitis of sacral region (H)    - oxyCODONE (ROXICODONE) 5 MG tablet; Take 1 tablet (5 mg) by mouth 3 times daily as needed for pain (Max 3 tabs/day) Profile Rx: patient will contact pharmacy when needed    Herpes zoster without complication  Stable on current regimen.  Continue same plan and routine follow-up.   - valACYclovir (VALTREX) 500 MG tablet; Take 1 tablet (500 mg) by mouth daily    Morbid obesity (H)  Stable and continuing to follow    Chronic, continuous use of opioids  Up-to-date on routine monitoring    Prostate cancer screening    - Prostate Specific Antigen Screen; Future    Planter fasciitis  Will refer to   "Chris for potential injection       BMI:   Estimated body mass index is 35.18 kg/m  as calculated from the following:    Height as of this encounter: 1.93 m (6' 4\").    Weight as of this encounter: 131.1 kg (289 lb).   Weight management plan: Discussed healthy diet and exercise guidelines    Depression Screening Follow Up        9/12/2023    10:10 AM   PHQ   PHQ-9 Total Score 12   Q9: Thoughts of better off dead/self-harm past 2 weeks Several days   F/U: Thoughts of suicide or self-harm Yes   F/U: Self harm-plan No   F/U: Self-harm action No   F/U: Safety concerns No       Follow Up    Follow Up Actions Taken      Discussed the following ways the patient can remain in a safe environment:    See Patient Instructions    Ksenia Lyles MD  [facility] NAWAF Francis is a 48 year old, presenting for the following health issues:  Physical Therapy (Referral )        9/13/2023     3:21 PM   Additional Questions   Roomed by Geovanna AGUILAR   Accompanied by Self         9/13/2023     3:21 PM   Patient Reported Additional Medications   Patient reports taking the following new medications Movantik 12.5mg       History of Present Illness       Reason for visit:  Medicare Annual Checkup    He eats 0-1 servings of fruits and vegetables daily.He consumes 2 sweetened beverage(s) daily.He exercises with enough effort to increase his heart rate 9 or less minutes per day.  He exercises with enough effort to increase his heart rate 3 or less days per week.   He is taking medications regularly.       Concern - Referral   Onset: Feet & Heel Pain   Description: Sharp pain  Intensity: moderate  Progression of Symptoms:  worsening and intermittent  Accompanying Signs & Symptoms: Sharp pain in feet & heels   Previous history of similar problem: Yes   Precipitating factors:        Worsened by: Yes  Alleviating factors:        Improved by: Icing/basic home physical therapy   Therapies tried and outcome: " "None      Here today primarily for follow-up on ongoing chronic issues.  Details as above.      Review of Systems   Constitutional, HEENT, cardiovascular, pulmonary, gi and gu systems are negative, except as otherwise noted.      Objective    /77 (BP Location: Right arm, Patient Position: Sitting, Cuff Size: Adult Large)   Pulse 82   Temp 99.1  F (37.3  C) (Oral)   Resp 18   Ht 1.93 m (6' 4\")   Wt 131.1 kg (289 lb)   SpO2 96%   BMI 35.18 kg/m    Body mass index is 35.18 kg/m .  Physical Exam   Alert, pleasant, upbeat, and in no apparent discomfort.  S1 and S2 normal, no murmurs, clicks, gallops or rubs. Regular rate and rhythm. Chest is clear; no wheezes or rales. No edema or JVD.  Past labs reviewed with the patient.                       "

## 2023-09-14 ENCOUNTER — OFFICE VISIT (OUTPATIENT)
Dept: RHEUMATOLOGY | Facility: CLINIC | Age: 48
End: 2023-09-14
Payer: MEDICARE

## 2023-09-14 VITALS
DIASTOLIC BLOOD PRESSURE: 73 MMHG | BODY MASS INDEX: 35.44 KG/M2 | RESPIRATION RATE: 16 BRPM | HEART RATE: 78 BPM | OXYGEN SATURATION: 95 % | HEIGHT: 76 IN | WEIGHT: 291 LBS | SYSTOLIC BLOOD PRESSURE: 107 MMHG

## 2023-09-14 DIAGNOSIS — M45.9 ANKYLOSING SPONDYLITIS, UNSPECIFIED SITE OF SPINE (H): Primary | ICD-10-CM

## 2023-09-14 PROCEDURE — 99205 OFFICE O/P NEW HI 60 MIN: CPT | Performed by: INTERNAL MEDICINE

## 2023-09-14 ASSESSMENT — PAIN SCALES - GENERAL: PAINLEVEL: MODERATE PAIN (4)

## 2023-09-14 NOTE — PATIENT INSTRUCTIONS
Diagnosis:  1.  Ankylosing spondylitis with sacroiliitis: Range of motion in the neck and lumbar spine is generally well-preserved.  Use of Enbrel over the past has been associated with reduced inflammatory back pain.  I recommend continuing the medication.  2.  Adult onset diabetes  3.  Degenerative disc disease and radiculopathy causing ongoing low back pain: I agree with planned treatments through pain service.  4.  Diverticulitis  5.  Microscopic colitis.    Plan:  1.  Continue etanercept 50 mg subcutaneous injected once weekly, to be prescribed through [facility] by pharmacy liaison.  Medication therapy management consultation placed.  2.  Creatinine, CBC every 6 months.  3.  If inflammatory low back pain recurs/persists despite planned radiofrequency ablation, consider MRI of sacroiliac joints for discerning degree of active inflammatory disease and a rationale for possible switch in immune modulating therapy.

## 2023-09-14 NOTE — NURSING NOTE
"Chief Complaint   Patient presents with    New Patient     Ankylosing spondylitis       Vitals:    09/14/23 1355   BP: 107/73   BP Location: Left arm   Patient Position: Sitting   Cuff Size: Adult Large   Pulse: 78   Resp: 16   SpO2: 95%   Weight: 132 kg (291 lb)   Height: 1.93 m (6' 4\")       Body mass index is 35.42 kg/m .    Linda Irby, [facility]  "

## 2023-09-15 DIAGNOSIS — G47.33 OSA (OBSTRUCTIVE SLEEP APNEA): Primary | ICD-10-CM

## 2023-09-16 ENCOUNTER — MYC MEDICAL ADVICE (OUTPATIENT)
Dept: RHEUMATOLOGY | Facility: CLINIC | Age: 48
End: 2023-09-16
Payer: MEDICARE

## 2023-09-18 ENCOUNTER — TELEPHONE (OUTPATIENT)
Dept: PALLIATIVE MEDICINE | Facility: CLINIC | Age: 48
End: 2023-09-18
Payer: MEDICARE

## 2023-09-18 DIAGNOSIS — M47.816 SPONDYLOSIS OF LUMBAR REGION WITHOUT MYELOPATHY OR RADICULOPATHY: Primary | ICD-10-CM

## 2023-09-18 NOTE — TELEPHONE ENCOUNTER
bilateral lumbosacral radiofrequency ablation at L4, L5 and sacral ala       Nguyen Chow    [facility] [place]

## 2023-09-19 ENCOUNTER — VIRTUAL VISIT (OUTPATIENT)
Dept: PALLIATIVE MEDICINE | Facility: CLINIC | Age: 48
End: 2023-09-19
Payer: MEDICARE

## 2023-09-19 DIAGNOSIS — M51.369 DDD (DEGENERATIVE DISC DISEASE), LUMBAR: ICD-10-CM

## 2023-09-19 DIAGNOSIS — M79.18 MYOFASCIAL MUSCLE PAIN: ICD-10-CM

## 2023-09-19 DIAGNOSIS — G89.29 CHRONIC INTRACTABLE PAIN: Primary | ICD-10-CM

## 2023-09-19 DIAGNOSIS — S31.809S GLUTEAL CLEFT WOUND, UNSPECIFIED LATERALITY, SEQUELA: ICD-10-CM

## 2023-09-19 DIAGNOSIS — M79.2 NEUROPATHIC PAIN: ICD-10-CM

## 2023-09-19 DIAGNOSIS — G57.10 MERALGIA PARESTHETICA, UNSPECIFIED LATERALITY: ICD-10-CM

## 2023-09-19 DIAGNOSIS — G57.12 MERALGIA PARESTHETICA, LEFT LOWER LIMB: ICD-10-CM

## 2023-09-19 DIAGNOSIS — E08.42 DIABETIC POLYNEUROPATHY ASSOCIATED WITH DIABETES MELLITUS DUE TO UNDERLYING CONDITION (H): ICD-10-CM

## 2023-09-19 DIAGNOSIS — M45.8 ANKYLOSING SPONDYLITIS OF SACRAL REGION (H): ICD-10-CM

## 2023-09-19 DIAGNOSIS — Z86.19 HISTORY OF SHINGLES: ICD-10-CM

## 2023-09-19 PROCEDURE — 96159 HLTH BHV IVNTJ INDIV EA ADDL: CPT | Mod: 95 | Performed by: PSYCHOLOGIST

## 2023-09-19 PROCEDURE — 96158 HLTH BHV IVNTJ INDIV 1ST 30: CPT | Mod: 95 | Performed by: PSYCHOLOGIST

## 2023-09-19 NOTE — TELEPHONE ENCOUNTER
I do not think that the inflammatory arthritis or skin disease, or their treatment, is causing microscopic colitis. I do not expect that enbrel would have a beneficial effect on colitis, but I defer to GI providers about whether additional therapy or monitoring is needed for the colitis. EJP

## 2023-09-19 NOTE — PROGRESS NOTES
Joel Pineda is a 48 year old male who is being evaluated via a billable video visit.      Patient is currently in the [facility]? yes    Patient would like the video invitation sent by: Text to cell phone: [phone]}    Video Start Time: 3:00 PM  Video Stop Time: 3:53 PM    Additional provider notes:     Pain Diagnoses per pain provider:     Chronic intractable pain    Diabetic polyneuropathy associated with diabetes mellitus due to underlying condition (H)    Myofascial muscle pain    Meralgia paresthetica, left lower limb    Ankylosing spondylitis of sacral region (H)    DDD (degenerative disc disease), lumbar    History of shingles    Gluteal cleft wound, unspecified laterality, sequela    Neuropathic pain    Meralgia paresthetica, unspecified laterality        DATA: During today's visit you reported the following: Your pain is largely unchanged. Your mood is about the same although increased anxiety which you note has caused increased use of Rispersdol. Your activity level is about the same - walked once to mailbox weekly - sat with anxiety, felt good to sit in the sun and felt proud encouraging yourself to do something that is challenging. Your stress level is increased. Your sleep is stable. You reported engaging in self-care for your pain 2-3 times daily.    You identified that you would like to focus on the following or had questions regarding the following issues or concerns, and we discussed the following:   - update on health  - explored strategies using to distract  - waiting for repeat RFA, massage before helps reduce procedural massage  - using ice from TIP skills  - psychoeducation on sympathetic nervous system response to pain   - window of tolerance is narrowed  - discussed using ice packs during procedures to ground yourself    ASSESSMENT: Joel's pain is mildly increased - he seems due to have repeat RFA as well as increased stress. He seems to be using skills regularly, and was  encouraged to use the proactively as well if he anticipates either pain or anxiety response.    PLAN:   Your next appointment is scheduled for 10/3 at 4:00 PM.  Assignment/Objectives /interventions for next session:   - continue to use skills to self-soothe  - try using ice during procedures    We believe regular attendance is key to your success in our program!    Any time you are unable to keep your appointment we ask that you call us at [phone] at least 24 hours in advance to cancel.This will allow us to offer the appointment time to another patient.   Multiple missed appointments may lead to dismissal from the clinic.    Telemedicine Visit: The patient's condition can be safely assessed and treated via synchronous audio and visual telemedicine encounter.      Reason for Telemedicine Visit: Services only offered telehealth    Originating Site (Patient Location): Patient's home    Distant Site (Provider Location): Provider Remote Setting- Home Office    Consent:  The patient/guardian has verbally consented to: the potential risks and benefits of telemedicine (video visit) versus in person care; bill my insurance or make self-payment for services provided; and responsibility for payment of non-covered services.     Mode of Communication:  Video Conference via Pulse    As the provider I attest to compliance with applicable laws and regulations related to telemedicine.      Shirley Bartlett PsyD LP  Licensed Psychologist  Outpatient Clinic Therapist  [facility]

## 2023-09-19 NOTE — TELEPHONE ENCOUNTER
Faxed completed PA form and supporting documentation for REPEAT LRFA to [employer]  Right fax confirmation          Lorraine CHAVARRIA    [facility]

## 2023-09-21 ASSESSMENT — PAIN SCALES - PAIN ENJOYMENT GENERAL ACTIVITY SCALE (PEG)
INTERFERED_ENJOYMENT_LIFE: 5
AVG_PAIN_PASTWEEK: 4
PEG_TOTALSCORE: 4.67
INTERFERED_GENERAL_ACTIVITY: 5

## 2023-09-21 NOTE — TELEPHONE ENCOUNTER
Incoming fax from [employer], no PA is required. Medicare guidelines are followed.      Coverage Guidance-No PA required    Coverage Indications, Limitations, and/or Medical Necessity    Repeat thermal facet joint RFA at the same anatomic site is considered medically reasonable and necessary provided the patient had a minimum of consistent 50% improvement in pain for at least six (6) months or at least 50% consistent improvement in the ability to perform previously painful movements and ADLs as compared to baseline measurement using the same scale;  Frequency Limitation: For each covered spinal region no more than two (2) radiofrequency sessions will be reimbursed per rolling 12 months.        Okay to schedule repeat LRFA      Lorraine CHAVARRIA    [facility]

## 2023-09-22 ENCOUNTER — OFFICE VISIT (OUTPATIENT)
Dept: PODIATRY | Facility: CLINIC | Age: 48
End: 2023-09-22
Attending: FAMILY MEDICINE
Payer: MEDICARE

## 2023-09-22 VITALS — BODY MASS INDEX: 34.96 KG/M2 | HEIGHT: 77 IN | OXYGEN SATURATION: 96 % | HEART RATE: 88 BPM

## 2023-09-22 DIAGNOSIS — M77.8 CAPSULITIS OF FOOT, UNSPECIFIED LATERALITY: Primary | ICD-10-CM

## 2023-09-22 DIAGNOSIS — M72.2 PLANTAR FASCIITIS: ICD-10-CM

## 2023-09-22 PROCEDURE — 99213 OFFICE O/P EST LOW 20 MIN: CPT | Mod: 25 | Performed by: PODIATRIST

## 2023-09-22 PROCEDURE — 20550 NJX 1 TENDON SHEATH/LIGAMENT: CPT | Mod: 50 | Performed by: PODIATRIST

## 2023-09-22 RX ADMIN — LIDOCAINE HYDROCHLORIDE 1 ML: 20 INJECTION, SOLUTION INFILTRATION; PERINEURAL at 12:56

## 2023-09-22 NOTE — LETTER
9/22/2023         RE: Joel Pineda  [id number] [facility] [place] [address]        Dear Colleague,    Thank you for referring your patient, Joel Pineda, to the [facility]. Please see a copy of my visit note below.    Subjective:    Patient is seen today for bilateral heel pain.  He has had this for at least 2 years.  We saw him in 2021 for this.  Also has chronic bilateral subsecond capsulitis.  Has done numerous offloading strategies for this.  Patient has ankylosing spondylitis and sees rheumatology.  He has diabetes with neuropathy.  Recently lost weight on Ozempic.    ROS:  see above       Allergies   Allergen Reactions     Amoxicillin-Pot Clavulanate Difficulty breathing     Banana Shortness Of Breath     Pt reports organic Banana is okay.      Nitroglycerin Palpitations     Penicillins Anaphylaxis     Provigil [Modafinil] Shortness Of Breath     headache     Gadolinium Hives and Itching     Patient was premedicated for the contrast allergy. He did still have a reaction a few hours after injection. Hives and itching. Dr. Gomez told tech to inform pt he should only have contrast again in the future when premedicated and at a hospital. Not at an outpatient facility.      Influenza Virus Vaccine Rash     Quadrivalent, cell based     Ketoconazole      Topical cream caused swelling and itching     Dye [Contrast Dye] Other (See Comments) and Hives     Moderate flushing, CT contrast     Gabapentin      Other reaction(s): hives     Golimumab      Hives, bradycardia, face swelling     Naproxen      Other reaction(s): Bleeding Gums     Neurontin [Gabapentin] Hives     Moderate hives     Nortriptyline Hives     Nystatin Hives     Varicella Virus Vaccine Live      Rash     Adhesive Tape Rash     Baclofen Other (See Comments)     Cognitive changes     Flagyl [Metronidazole Hcl] Palpitations and Hives     Latex Rash     Metronidazole Palpitations, Other (See Comments) and Rash      "dizziness (versus ciprofloxacin taken at same time)       Current Outpatient Medications   Medication Sig Dispense Refill     acetaminophen 500 MG CAPS Take 1,000 mg by mouth 3 times daily 60 capsule      albuterol (PROAIR HFA/PROVENTIL HFA/VENTOLIN HFA) 108 (90 Base) MCG/ACT inhaler INHALE 2 PUFFS BY MOUTH EVERY 4 HOURS AS NEEDED FOR SHORTNESS OF BREATH, DYSPNEA, OR WHEEZING 25.5 g 3     aspirin (ASA) 81 MG tablet Take 81 mg by mouth daily        B-D LUER-LUCILLE SYRINGE 25G X 1\" 3 ML MISC USE WITH B12 INJECTIONS 12 each 0     buPROPion (WELLBUTRIN XL) 300 MG 24 hr tablet Take 1 tablet by mouth daily       cholecalciferol (D3-50) 1250 mcg (55843 units) capsule TAKE ONE CAPSULE BY MOUTH EVERY 2 WEEKS 24 capsule 0     clonazePAM (KLONOPIN) 0.5 MG tablet Take 1 mg by mouth At Bedtime For RBD       cyanocobalamin (CYANOCOBALAMIN) 1000 MCG/ML injection INJECT 1 ML INTO THE MUSCLE EVERY 30 DAYS 10 mL 0     DULoxetine (CYMBALTA) 60 MG capsule Take 120 mg by mouth daily        EPINEPHrine (ANY BX GENERIC EQUIV) 0.3 MG/0.3ML injection 2-pack Inject 0.3 mLs (0.3 mg) into the muscle once as needed for anaphylaxis 0.6 mL 3     eszopiclone (LUNESTA) 3 MG tablet Take 3 mg by mouth At Bedtime        etanercept (ENBREL SURECLICK) 50 MG/ML autoinjector Inject 50 mg Subcutaneous once a week . Hold for signs of infection, and seek medical attention. 4 mL 7     famotidine (PEPCID) 20 MG tablet Take 20 mg by mouth daily       fenofibrate (TRICOR) 48 MG tablet TAKE ONE TABLET BY MOUTH ONCE DAILY 90 tablet 0     fluticasone-salmeterol (ADVAIR-HFA) 45-21 MCG/ACT inhaler Inhale 2 puffs into the lungs 2 times daily       levothyroxine (SYNTHROID/LEVOTHROID) 75 MCG tablet TAKE ONE TABLET BY MOUTH EVERY MORNING 90 tablet 1     lidocaine (XYLOCAINE) 5 % external ointment Apply topically 2 times daily as needed for moderate pain 50 g 3     lidocaine-prilocaine (EMLA) 2.5-2.5 % external cream Apply topically as needed for moderate pain 60 g 3     " melatonin 3 MG tablet Take 13 mg by mouth nightly as needed        methocarbamol (ROBAXIN) 500 MG tablet TAKE 1 - 2 TABLETS BY MOUTH 4 TIMES DAILY AS NEEDED FOR MUSCLE SPASMS 240 tablet 1     metoclopramide (REGLAN) 5 MG tablet Take 5 mg by mouth 4 times daily as needed       metoprolol succinate ER (TOPROL XL) 200 MG 24 hr tablet Take 1 tablet (200 mg) by mouth 2 times daily 180 tablet 1     montelukast (SINGULAIR) 10 MG tablet TAKE ONE TABLET BY MOUTH EVERY EVENING 90 tablet 3     nabumetone (RELAFEN) 500 MG tablet Take 1-2 tablets (500-1,000 mg) by mouth 2 times daily as needed for moderate pain 120 tablet 1     naloxegol (MOVANTIK) 12.5 MG TABS tablet        naloxone (NARCAN) 4 MG/0.1ML nasal spray Spray 1 spray (4 mg) into one nostril alternating nostrils once as needed for opioid reversal every 2-3 minutes until assistance arrives 0.2 mL 0     olopatadine (PATADAY) 0.2 % ophthalmic solution Place 1 drop into both eyes daily 2.5 mL 3     omega-3 acid ethyl esters (LOVAZA) 1 g capsule TAKE TWO CAPSULES BY MOUTH TWICE DAILY 360 capsule 0     omeprazole (PRILOSEC) 20 MG DR capsule Take 20 mg by mouth as needed       oxyCODONE (ROXICODONE) 5 MG tablet Take 1 tablet (5 mg) by mouth 3 times daily as needed for pain (Max 3 tabs/day) Profile Rx: patient will contact pharmacy when needed 50 tablet 0     pregabalin (LYRICA) 150 MG capsule Take 1 capsule (150 mg) by mouth 3 times daily 270 capsule 1     pseudoePHEDrine-guaiFENesin (MUCINEX D)  MG 12 hr tablet Take 1 tablet by mouth every 12 hours       pyridostigmine (MESTINON) 60 MG tablet Take 1 tablet by mouth 3 times daily       ramipril (ALTACE) 10 MG capsule TAKE ONE CAPSULE BY MOUTH ONCE DAILY 90 capsule 1     risperiDONE (RISPERDAL) 0.5 MG tablet Take 0.5 mg by mouth 2 times daily as needed       rizatriptan (MAXALT) 10 MG tablet Take 1 tablet (10 mg) by mouth at onset of headache for migraine May repeat in 2 hours. Max 3 tablets/24 hours. 30 tablet 1      rosuvastatin (CRESTOR) 40 MG tablet Take 1 tablet (40 mg) by mouth daily 90 tablet 1     Semaglutide, 1 MG/DOSE, 4 MG/3ML SOPN Inject 1 mg Subcutaneous once a week 3 mL 11     sodium fluoride 1.1 % CREA At Bedtime       spacer (OPTICHAMBER JOHNNY) holding chamber To be used in conjunction with albuterol (PROAIR HFA/PROVENTIL HFA/VENTOLIN HFA) 108 (90 Base) MCG/ACT inhaler 1 each 0     valACYclovir (VALTREX) 500 MG tablet Take 1 tablet (500 mg) by mouth daily 90 tablet 3     vitamin B complex with vitamin C (STRESS TAB) tablet Take 1 tablet by mouth daily       ZINC SULFATE-VITAMIN C MT Take 1 tablet by mouth daily         Patient Active Problem List   Diagnosis     Intermittent asthma     Chronic nonallergic rhinitis     Diverticulosis     GERD (gastroesophageal reflux disease)     Generalized anxiety disorder     LLOYD (obstructive sleep apnea)- mild (AHI 11)     Intracranial arachnoid cyst     Facet arthritis of cervical region     Acquired hypothyroidism     Chronic midline low back pain without sciatica     Irritable bowel syndrome with diarrhea     B12 deficiency     Essential hypertension with goal blood pressure less than 140/90     Chronic, continuous use of opioids     Ankylosing spondylitis of sacral region (H)     Morbid obesity (H)     Fatty infiltration of liver     DDD (degenerative disc disease), lumbar     Type 2 diabetes mellitus with complication, without long-term current use of insulin (H)     Peripheral polyneuropathy     History of pulmonary embolism     Ingrown toenail     Hypertriglyceridemia     Gastroparesis     Orthostatic dizziness     POTS (postural orthostatic tachycardia syndrome)     PHN (postherpetic neuralgia)     Dysautonomia (H)     Chronic pain syndrome     Borderline personality disorder (H)     MDD (major depressive disorder), recurrent severe, without psychosis (H)     Folliculitis     Immunosuppression (H)     Small fiber neuropathy     RBD (REM behavioral disorder)      Hyperlipidemia LDL goal <70       Past Medical History:   Diagnosis Date     Acne      Acquired hypothyroidism      Allergic state      Ankylosing spondylitis lumbar region (H)      Ankylosing spondylitis of sacral region (H)      Anxiety      Bipolar 2 disorder (H)      Chest pain     Chest pain, regulated w/BP meds. Clear arteries.     Chronic pain      DDD (degenerative disc disease), lumbar      Depressive disorder      Diabetes (H)      Diverticulosis      Facet arthritis of cervical region      Gastroesophageal reflux disease      Hypertension      IBS (irritable bowel syndrome)      Intracranial arachnoid cyst      Major depressive disorder, recurrent episode (H)     Multiple psych providers - they manage meds August 2015: Provigil induced severe mood dis-function      Major depressive disorder, recurrent episode, severe (H) 12/2/2020     MDD (major depressive disorder), recurrent severe, without psychosis (H) 7/21/2021     Mixed dyslipidemia 4/6/2023     LLOYD (obstructive sleep apnea)- mild (AHI 11)      Polyneuropathy      Pulmonary embolism (H)      Skin exam, screening for cancer 12/3/2013     Sleep apnea      Uncomplicated asthma        Past Surgical History:   Procedure Laterality Date     BACK SURGERY  10/2007    lumbar discectomy L5-S1     BIOPSY  09/15/2020    Colon Adenomas x2     COLONOSCOPY      Note: colonoscopy scheduled with [facility] on Friday, 9/4/15     COSMETIC SURGERY  2012    Nose Exterior - functional     GI SURGERY  08/2013    Sigmoidectomy     HERNIA REPAIR, UMBILICAL  08/23/2011    smallDr. Evan     INCISION AND DRAINAGE, ABSCESS, COMPLEX  08/23/2011    umbilical, Dr. Evan Beavers     LAPAROSCOPIC ASSISTED COLECTOMY LEFT (DESCENDING)  08/15/2013    Procedure: LAPAROSCOPIC ASSISTED COLECTOMY LEFT (DESCENDING);  Laparoscopic Hand Assisted Sigmoid Resection, Mobilization of Splenic Fissure, coloproctoscopy, *Latex Free Room* Anesthesia General with Pain block  ;  Surgeon: Reshma  "Aurora Prieto MD;  Location: [facility] OR     NERVE SURGERY  08/18/2011    RF ablation @ L3-S1 @ [facility]     RECONSTRUCT NOSE AND SEPTUM (FUNCTIONAL)  10/14/2011    Procedure:RECONSTRUCT NOSE AND SEPTUM (FUNCTIONAL); Functional Septorhinoplasty, Turbinate Reduction, ; Surgeon:CEDRIC CUEVAS; Location:[facility] OR     SINUS SURGERY  10/01/2001    ethmoidectomy chronic sinusitis     SOFT TISSUE SURGERY  4/7/2022    Hidradenitis Suppurativa surgery       Family History   Problem Relation Age of Onset     Musculoskeletal Disorder Mother         back     Anxiety Disorder Mother      Colon Polyps Mother      Ulcerative Colitis Mother         and ischemic small intestine, surgery     Hypertension Mother      Breast Cancer Mother      Osteoporosis Mother      Diabetes Mother         Type 2, Diagnosed in 2014     Depression Mother         Takes Cymbalta to help with chronic pain + depx     Thyroid Disease Mother         Hypothyroidism     Obesity Mother         Under much better control latter half of 2015     Musculoskeletal Disorder Father         back     Substance Abuse Father         Alcohol     Hypertension Father      Hyperlipidemia Father      Depression Father         Off meds for many years. Seems \"ok\"     Anxiety Disorder Father      Heart Disease Maternal Grandmother      Heart Disease Maternal Grandfather      Psychotic Disorder Paternal Grandfather      Suicide Paternal Grandfather      Depression Paternal Grandfather         Pediatrician. Committed suicide by pistol in 1990.     Musculoskeletal Disorder Brother         back     Depression Brother         Expressed as anger and moodiness     Substance Abuse Brother         Alcohol     Anxiety Disorder Brother      Substance Abuse Sister         Alcohol     Depression Sister         Mental Health Therapist, yet no anti-depressants?     Anxiety Disorder Sister         Mental Health Therapist, yet no anti-anxiety meds?     Other Cancer Other         Bladder     Substance Abuse " "Brother      Colon Cancer No family hx of      Crohn's Disease No family hx of      Anesthesia Reaction No family hx of      Cancer No family hx of         No family history of skin cancer       Social History     Tobacco Use     Smoking status: Never     Smokeless tobacco: Never   Substance Use Topics     Alcohol use: Not Currently     Comment: occ 1/week         Objective:    Vitals: Pulse 88   Ht 1.943 m (6' 4.5\")   SpO2 96%   BMI 34.96 kg/m    BMI: Body mass index is 34.96 kg/m .  Height: 6' 4.5\"    Constitutional/ general:  Pt is in no apparent distress, appears well-nourished.  Cooperative with history and physical exam.     Psych:  The patient answered questions appropriately.  Normal affect.  Seems to have reasonable expectations, in terms of treatment.     Lungs:  Non labored breathing, non labored speech. No cough.  No audible wheezing. Even, quiet breathing.       Vascular:  Pedal pulses are palpable bilaterally for both the DP and PT arteries.  CFT < 3 sec.  No edema.  Pedal hair growth noted.     Neuro:  Alert and oriented x 3. Coordinated gait.  Light touch sensation is intact     Derm: Normal texture and turgor.  No erythema, ecchymosis, or cyanosis.  No open lesions.     Musculoskeletal:    Lower extremity muscle strength is normal.  Patient is ambulatory without an assistive device or brace.  No gross deformities.      Normal arch with weightbearing.   ROM is within normal limits.  Negative tinel's sign.  No side to side compression pain of the calcaneus.  Pain upon palpation to the bilateral plantarmedial  of the calcaneus.  No erythema, edema, ecchymosis, or subcutaneous masses noted.  No pain on palpation or stressing any tendons.  Negative Tinel's sign.  Bilateral subsecond capsulitis noted.    Radiographic Exam:  X-Ray Findings:  I personally reviewed the films.  Normal  MRI left foot 2021 results noted.      Component Ref Range & Units 3 mo ago     Hemoglobin A1C POCT 4.3 - <5.7 % 5.5 " Negative (Neg)       Assessment:    Ankylosing spondylitis   Diabetes mellitus   Plantar Fasciitis right and left foot   Bilateral subsecond capsulitis    Plan:  X-rays and MRI from past personally reviewed.  Reviewed recent rheumatology note.  Patient has been icing stretching wearing good shoes and orthotics.  Would like to try injection.  Risks complications, and efficacy of cortisone injection discussed.  Patient understands there is a risk of plantar fascial rupture.  After written consent, sterile prep, injected heel with 1 cc 1% lidocaine plain and 1 cc kenalog 10 mg.  Continue good shoes at all times.  We dispensed heel cups today.  Subsecond capsulitis is still hurting.  He is wondering about an injection for this as well.  We will have the patient return to clinic in approximately a month and see if forfeit feeling better with injecting heels.       Peng Perez DPM, FACFAS        Small Joint Injection/Arthrocentesis    Date/Time: 9/22/2023 12:56 PM    Performed by: Peng Perez DPM  Authorized by: Peng Perez DPM  Indications:  Pain  Needle Size:  27 G  Guidance: landmark     Approach:  Plantar  Location:  Foot   Location comment:  Bilateral Heels  Plantar Fascia                      Medications:  10 mg triamcinolone acetonide 10 MG/ML; 1 mL lidocaine 2 %        Outcome:  Tolerated well, no immediate complications  Procedure discussed: discussed risks, benefits, and alternatives    Consent Given by:  Patient  Timeout: timeout called immediately prior to procedure    Prep: patient was prepped and draped in usual sterile fashion       Bilateral Heels injections          Again, thank you for allowing me to participate in the care of your patient.        Sincerely,        Peng Perez DPM

## 2023-09-22 NOTE — TELEPHONE ENCOUNTER
Screening Questions for RFA Procedure      Procedure ordered? repeat LRFA     What insurance are we billing for this procedure?  Medicare  IF SCHEDULING AT [facility] PLEASE SCHEDULE AT LEAST 7-10 BUSINESS DAYS OUT SO A PA CAN BE OBTAINED    Is patient scheduled at [facility]? no  If YES, route every encounter to [facility] CARE NAVIGATION POOL [[phone]]  Has patient had this injection before? Yes:   Any chance of pregnancy? NO   If YES, do NOT schedule and route to RN pool     Is  Needed?: No  Will patient have a ?  Yes   If pt is given sedation meds, no driving for 24 hours.  Is pt taking a cab or transportation service? NO      If so will need to be accompanied by an adult too (friend/family member) in order for IV sedation to be given.    Per [place] Policy:  Outpatients are to have responsible adult or family member to accompany them at discharge and drive them home. A service providing medically trained drivers or attendants would be acceptable. Public transportation would not be acceptable unless the patient is accompanied by a responsible adult or family member.  Is patient taking any blood thinners (i.e. plavix, coumadin, jantoven, warfarin, heparin, pradaxa or dabigatran, etc)? No   If YES, do NOT schedule, and route to RN pool    Is patient taking any aspirin products? Yes - Pt takes 81mg daily; instructed to hold 0 day(s) prior to procedure.    If more than 325mg/day, OK to schedule; Instruct pt to decrease to less than 325 mg for 7 days AND route to RN pool  For CERVICAL procedures, hold all aspirin products for 6 days.   Tell pt that if aspirin product is not held for 6 days, the procedure WILL BE cancelled.      Does the patient have a bleeding or clotting disorder? No   If YES, it it OKAY to schedule AND route to RN pool  **For any patients with platelet count <100, must be forwarded to provider**    Is patient diabetic? No If YES, have them bring  their glucometer.    Does patient have an active infection or treated for one within the past week? No   If YES, do NOT schedule and route to RN nurse pool     Is patient currently taking any antibiotics?  No  For patients on chronic, preventative, or prophylactic antibiotics, procedures may be scheduled.   For patients on antibiotics for active or recent infection:antibiotic course must have been completed for 4 days    Is patient currently taking any steroid medications? (i.e. Prednisone, Medrol)  No   For patients on steroid medications, course must have been completed for 4 days    Is patient actively being treated for cancer or immunocompromised, including the spleen having been removed? No  If YES, do NOT schedule and route to RN pool     Any history of complications with sedation medications?  NO   If YES, OK to schedule AND route to RN pool     Any history of sleep apnea?  YES   If YES, OK to schedule AND route to RN pool     Any cardiac history?  NO   If YES, OK to schedule AND route to RN pool     Do you have an implanted pacemaker, ICD (implanted cardiac device) or AICD (automatic implanted cardiac device)?  NO  If YES, do NOT schedule AND route to RN pool.   Obtain name of device :     Obtain name of cardiologist:       Do you have an implanted stimulator?  YES  If YES, OK to schedule AND route to nursing.   Instruct patient to bring in the remote to the appointment and it will need to be turned off.  reviewed      Does patient have an allergy to contrast dye, iodine or shellfish? YES     If YES, OK to schedule. Route to RN pool AND add allergy information to appointment notes    Are you able to get on and off an exam table with minimal or no assistance? Yes   If NO, do NOT schedule and route to RN pool    Are you able to roll over and lay on your stomach with minimal or no assistance? Yes   If NO, do NOT schedule and route to RN pool    Reminders:  If you are started on any steroids or  antibiotics between now and your appointment, you must contact us because it may affect our ability to perform your procedure.  Yes  Informed patient that s/he needs to fast for 6 hours before procedure?  YES  Informed patient that it is OK to take normal medications with sips of water, especially blood pressure medications, before the procedure and must hold blood thinners as instructed.  Yes  Informed patient to arrive 30 minutes before procedure time to have an IV inserted.  reviewed   Do NOT schedule at 0745, 0815 or 1245.  reviewed   All radiofrequency ablations are in a 90 minute time slot.  reviewed

## 2023-09-22 NOTE — PROGRESS NOTES
Small Joint Injection/Arthrocentesis    Date/Time: 9/22/2023 12:56 PM    Performed by: Peng Perez DPM  Authorized by: Peng Perez DPM  Indications:  Pain  Needle Size:  27 G  Guidance: landmark     Approach:  Plantar  Location:  Foot   Location comment:  Bilateral Heels  Plantar Fascia                      Medications:  10 mg triamcinolone acetonide 10 MG/ML; 1 mL lidocaine 2 %        Outcome:  Tolerated well, no immediate complications  Procedure discussed: discussed risks, benefits, and alternatives    Consent Given by:  Patient  Timeout: timeout called immediately prior to procedure    Prep: patient was prepped and draped in usual sterile fashion       Bilateral Heels injections

## 2023-09-22 NOTE — PROGRESS NOTES
Subjective:    Patient is seen today for bilateral heel pain.  He has had this for at least 2 years.  We saw him in 2021 for this.  Also has chronic bilateral subsecond capsulitis.  Has done numerous offloading strategies for this.  Patient has ankylosing spondylitis and sees rheumatology.  He has diabetes with neuropathy.  Recently lost weight on Ozempic.    ROS:  see above       Allergies   Allergen Reactions    Amoxicillin-Pot Clavulanate Difficulty breathing    Banana Shortness Of Breath     Pt reports organic Banana is okay.     Nitroglycerin Palpitations    Penicillins Anaphylaxis    Provigil [Modafinil] Shortness Of Breath     headache    Gadolinium Hives and Itching     Patient was premedicated for the contrast allergy. He did still have a reaction a few hours after injection. Hives and itching. Dr. Gomez told tech to inform pt he should only have contrast again in the future when premedicated and at a hospital. Not at an outpatient facility.     Influenza Virus Vaccine Rash     Quadrivalent, cell based    Ketoconazole      Topical cream caused swelling and itching    Dye [Contrast Dye] Other (See Comments) and Hives     Moderate flushing, CT contrast    Gabapentin      Other reaction(s): hives    Golimumab      Hives, bradycardia, face swelling    Naproxen      Other reaction(s): Bleeding Gums    Neurontin [Gabapentin] Hives     Moderate hives    Nortriptyline Hives    Nystatin Hives    Varicella Virus Vaccine Live      Rash    Adhesive Tape Rash    Baclofen Other (See Comments)     Cognitive changes    Flagyl [Metronidazole Hcl] Palpitations and Hives    Latex Rash    Metronidazole Palpitations, Other (See Comments) and Rash     dizziness (versus ciprofloxacin taken at same time)       Current Outpatient Medications   Medication Sig Dispense Refill    acetaminophen 500 MG CAPS Take 1,000 mg by mouth 3 times daily 60 capsule     albuterol (PROAIR HFA/PROVENTIL HFA/VENTOLIN HFA) 108 (90 Base) MCG/ACT  "inhaler INHALE 2 PUFFS BY MOUTH EVERY 4 HOURS AS NEEDED FOR SHORTNESS OF BREATH, DYSPNEA, OR WHEEZING 25.5 g 3    aspirin (ASA) 81 MG tablet Take 81 mg by mouth daily       B-D LUER-LUCILLE SYRINGE 25G X 1\" 3 ML MISC USE WITH B12 INJECTIONS 12 each 0    buPROPion (WELLBUTRIN XL) 300 MG 24 hr tablet Take 1 tablet by mouth daily      cholecalciferol (D3-50) 1250 mcg (94099 units) capsule TAKE ONE CAPSULE BY MOUTH EVERY 2 WEEKS 24 capsule 0    clonazePAM (KLONOPIN) 0.5 MG tablet Take 1 mg by mouth At Bedtime For RBD      cyanocobalamin (CYANOCOBALAMIN) 1000 MCG/ML injection INJECT 1 ML INTO THE MUSCLE EVERY 30 DAYS 10 mL 0    DULoxetine (CYMBALTA) 60 MG capsule Take 120 mg by mouth daily       EPINEPHrine (ANY BX GENERIC EQUIV) 0.3 MG/0.3ML injection 2-pack Inject 0.3 mLs (0.3 mg) into the muscle once as needed for anaphylaxis 0.6 mL 3    eszopiclone (LUNESTA) 3 MG tablet Take 3 mg by mouth At Bedtime       etanercept (ENBREL SURECLICK) 50 MG/ML autoinjector Inject 50 mg Subcutaneous once a week . Hold for signs of infection, and seek medical attention. 4 mL 7    famotidine (PEPCID) 20 MG tablet Take 20 mg by mouth daily      fenofibrate (TRICOR) 48 MG tablet TAKE ONE TABLET BY MOUTH ONCE DAILY 90 tablet 0    fluticasone-salmeterol (ADVAIR-HFA) 45-21 MCG/ACT inhaler Inhale 2 puffs into the lungs 2 times daily      levothyroxine (SYNTHROID/LEVOTHROID) 75 MCG tablet TAKE ONE TABLET BY MOUTH EVERY MORNING 90 tablet 1    lidocaine (XYLOCAINE) 5 % external ointment Apply topically 2 times daily as needed for moderate pain 50 g 3    lidocaine-prilocaine (EMLA) 2.5-2.5 % external cream Apply topically as needed for moderate pain 60 g 3    melatonin 3 MG tablet Take 13 mg by mouth nightly as needed       methocarbamol (ROBAXIN) 500 MG tablet TAKE 1 - 2 TABLETS BY MOUTH 4 TIMES DAILY AS NEEDED FOR MUSCLE SPASMS 240 tablet 1    metoclopramide (REGLAN) 5 MG tablet Take 5 mg by mouth 4 times daily as needed      metoprolol succinate " ER (TOPROL XL) 200 MG 24 hr tablet Take 1 tablet (200 mg) by mouth 2 times daily 180 tablet 1    montelukast (SINGULAIR) 10 MG tablet TAKE ONE TABLET BY MOUTH EVERY EVENING 90 tablet 3    nabumetone (RELAFEN) 500 MG tablet Take 1-2 tablets (500-1,000 mg) by mouth 2 times daily as needed for moderate pain 120 tablet 1    naloxegol (MOVANTIK) 12.5 MG TABS tablet       naloxone (NARCAN) 4 MG/0.1ML nasal spray Spray 1 spray (4 mg) into one nostril alternating nostrils once as needed for opioid reversal every 2-3 minutes until assistance arrives 0.2 mL 0    olopatadine (PATADAY) 0.2 % ophthalmic solution Place 1 drop into both eyes daily 2.5 mL 3    omega-3 acid ethyl esters (LOVAZA) 1 g capsule TAKE TWO CAPSULES BY MOUTH TWICE DAILY 360 capsule 0    omeprazole (PRILOSEC) 20 MG DR capsule Take 20 mg by mouth as needed      oxyCODONE (ROXICODONE) 5 MG tablet Take 1 tablet (5 mg) by mouth 3 times daily as needed for pain (Max 3 tabs/day) Profile Rx: patient will contact pharmacy when needed 50 tablet 0    pregabalin (LYRICA) 150 MG capsule Take 1 capsule (150 mg) by mouth 3 times daily 270 capsule 1    pseudoePHEDrine-guaiFENesin (MUCINEX D)  MG 12 hr tablet Take 1 tablet by mouth every 12 hours      pyridostigmine (MESTINON) 60 MG tablet Take 1 tablet by mouth 3 times daily      ramipril (ALTACE) 10 MG capsule TAKE ONE CAPSULE BY MOUTH ONCE DAILY 90 capsule 1    risperiDONE (RISPERDAL) 0.5 MG tablet Take 0.5 mg by mouth 2 times daily as needed      rizatriptan (MAXALT) 10 MG tablet Take 1 tablet (10 mg) by mouth at onset of headache for migraine May repeat in 2 hours. Max 3 tablets/24 hours. 30 tablet 1    rosuvastatin (CRESTOR) 40 MG tablet Take 1 tablet (40 mg) by mouth daily 90 tablet 1    Semaglutide, 1 MG/DOSE, 4 MG/3ML SOPN Inject 1 mg Subcutaneous once a week 3 mL 11    sodium fluoride 1.1 % CREA At Bedtime      spacer ([facility]) holding chamber To be used in conjunction with albuterol (PROAIR  HFA/PROVENTIL HFA/VENTOLIN HFA) 108 (90 Base) MCG/ACT inhaler 1 each 0    valACYclovir (VALTREX) 500 MG tablet Take 1 tablet (500 mg) by mouth daily 90 tablet 3    vitamin B complex with vitamin C (STRESS TAB) tablet Take 1 tablet by mouth daily      ZINC SULFATE-VITAMIN C MT Take 1 tablet by mouth daily         Patient Active Problem List   Diagnosis    Intermittent asthma    Chronic nonallergic rhinitis    Diverticulosis    GERD (gastroesophageal reflux disease)    Generalized anxiety disorder    LLOYD (obstructive sleep apnea)- mild (AHI 11)    Intracranial arachnoid cyst    Facet arthritis of cervical region    Acquired hypothyroidism    Chronic midline low back pain without sciatica    Irritable bowel syndrome with diarrhea    B12 deficiency    Essential hypertension with goal blood pressure less than 140/90    Chronic, continuous use of opioids    Ankylosing spondylitis of sacral region (H)    Morbid obesity (H)    Fatty infiltration of liver    DDD (degenerative disc disease), lumbar    Type 2 diabetes mellitus with complication, without long-term current use of insulin (H)    Peripheral polyneuropathy    History of pulmonary embolism    Ingrown toenail    Hypertriglyceridemia    Gastroparesis    Orthostatic dizziness    POTS (postural orthostatic tachycardia syndrome)    PHN (postherpetic neuralgia)    Dysautonomia (H)    Chronic pain syndrome    Borderline personality disorder (H)    MDD (major depressive disorder), recurrent severe, without psychosis (H)    Folliculitis    Immunosuppression (H)    Small fiber neuropathy    RBD (REM behavioral disorder)    Hyperlipidemia LDL goal <70       Past Medical History:   Diagnosis Date    Acne     Acquired hypothyroidism     Allergic state     Ankylosing spondylitis lumbar region (H)     Ankylosing spondylitis of sacral region (H)     Anxiety     Bipolar 2 disorder (H)     Chest pain     Chest pain, regulated w/BP meds. Clear arteries.    Chronic pain     DDD  (degenerative disc disease), lumbar     Depressive disorder     Diabetes (H)     Diverticulosis     Facet arthritis of cervical region     Gastroesophageal reflux disease     Hypertension     IBS (irritable bowel syndrome)     Intracranial arachnoid cyst     Major depressive disorder, recurrent episode (H)     Multiple psych providers - they manage meds August 2015: Provigil induced severe mood dis-function     Major depressive disorder, recurrent episode, severe (H) 12/2/2020    MDD (major depressive disorder), recurrent severe, without psychosis (H) 7/21/2021    Mixed dyslipidemia 4/6/2023    LLOYD (obstructive sleep apnea)- mild (AHI 11)     Polyneuropathy     Pulmonary embolism (H)     Skin exam, screening for cancer 12/3/2013    Sleep apnea     Uncomplicated asthma        Past Surgical History:   Procedure Laterality Date    BACK SURGERY  10/2007    lumbar discectomy L5-S1    BIOPSY  09/15/2020    Colon Adenomas x2    COLONOSCOPY      Note: colonoscopy scheduled with [facility] on Friday, 9/4/15    COSMETIC SURGERY  2012    Nose Exterior - functional    GI SURGERY  08/2013    Sigmoidectomy    HERNIA REPAIR, UMBILICAL  08/23/2011    smallDr. Evan    INCISION AND DRAINAGE, ABSCESS, COMPLEX  08/23/2011    umbilical, Dr. Evan Beavers    LAPAROSCOPIC ASSISTED COLECTOMY LEFT (DESCENDING)  08/15/2013    Procedure: LAPAROSCOPIC ASSISTED COLECTOMY LEFT (DESCENDING);  Laparoscopic Hand Assisted Sigmoid Resection, Mobilization of Splenic Fissure, coloproctoscopy, *Latex Free Room* Anesthesia General with Pain block  ;  Surgeon: Aurora Justice MD;  Location: [facility] OR    NERVE SURGERY  08/18/2011    RF ablation @ L3-S1 @ [facility]    RECONSTRUCT NOSE AND SEPTUM (FUNCTIONAL)  10/14/2011    Procedure:RECONSTRUCT NOSE AND SEPTUM (FUNCTIONAL); Functional Septorhinoplasty, Turbinate Reduction, ; Surgeon:CEDRIC CUEVAS; Location:[facility] OR    SINUS SURGERY  10/01/2001    ethmoidectomy chronic sinusitis    SOFT TISSUE SURGERY  4/7/2022  "   Hidradenitis Suppurativa surgery       Family History   Problem Relation Age of Onset    Musculoskeletal Disorder Mother         back    Anxiety Disorder Mother     Colon Polyps Mother     Ulcerative Colitis Mother         and ischemic small intestine, surgery    Hypertension Mother     Breast Cancer Mother     Osteoporosis Mother     Diabetes Mother         Type 2, Diagnosed in 2014    Depression Mother         Takes Cymbalta to help with chronic pain + depx    Thyroid Disease Mother         Hypothyroidism    Obesity Mother         Under much better control latter half of 2015    Musculoskeletal Disorder Father         back    Substance Abuse Father         Alcohol    Hypertension Father     Hyperlipidemia Father     Depression Father         Off meds for many years. Seems \"ok\"    Anxiety Disorder Father     Heart Disease Maternal Grandmother     Heart Disease Maternal Grandfather     Psychotic Disorder Paternal Grandfather     Suicide Paternal Grandfather     Depression Paternal Grandfather         Pediatrician. Committed suicide by pistol in 1990.    Musculoskeletal Disorder Brother         back    Depression Brother         Expressed as anger and moodiness    Substance Abuse Brother         Alcohol    Anxiety Disorder Brother     Substance Abuse Sister         Alcohol    Depression Sister         Mental Health Therapist, yet no anti-depressants?    Anxiety Disorder Sister         Mental Health Therapist, yet no anti-anxiety meds?    Other Cancer Other         Bladder    Substance Abuse Brother     Colon Cancer No family hx of     Crohn's Disease No family hx of     Anesthesia Reaction No family hx of     Cancer No family hx of         No family history of skin cancer       Social History     Tobacco Use    Smoking status: Never    Smokeless tobacco: Never   Substance Use Topics    Alcohol use: Not Currently     Comment: occ 1/week         Objective:    Vitals: Pulse 88   Ht 1.943 m (6' 4.5\")   SpO2 96%   " "BMI 34.96 kg/m    BMI: Body mass index is 34.96 kg/m .  Height: 6' 4.5\"    Constitutional/ general:  Pt is in no apparent distress, appears well-nourished.  Cooperative with history and physical exam.     Psych:  The patient answered questions appropriately.  Normal affect.  Seems to have reasonable expectations, in terms of treatment.     Lungs:  Non labored breathing, non labored speech. No cough.  No audible wheezing. Even, quiet breathing.       Vascular:  Pedal pulses are palpable bilaterally for both the DP and PT arteries.  CFT < 3 sec.  No edema.  Pedal hair growth noted.     Neuro:  Alert and oriented x 3. Coordinated gait.  Light touch sensation is intact     Derm: Normal texture and turgor.  No erythema, ecchymosis, or cyanosis.  No open lesions.     Musculoskeletal:    Lower extremity muscle strength is normal.  Patient is ambulatory without an assistive device or brace.  No gross deformities.      Normal arch with weightbearing.   ROM is within normal limits.  Negative tinel's sign.  No side to side compression pain of the calcaneus.  Pain upon palpation to the bilateral plantarmedial  of the calcaneus.  No erythema, edema, ecchymosis, or subcutaneous masses noted.  No pain on palpation or stressing any tendons.  Negative Tinel's sign.  Bilateral subsecond capsulitis noted.    Radiographic Exam:  X-Ray Findings:  I personally reviewed the films.  Normal  MRI left foot 2021 results noted.      Component Ref Range & Units 3 mo ago     Hemoglobin A1C POCT 4.3 - <5.7 % 5.5 Negative (Neg)       Assessment:    Ankylosing spondylitis   Diabetes mellitus   Plantar Fasciitis right and left foot   Bilateral subsecond capsulitis    Plan:  X-rays and MRI from past personally reviewed.  Reviewed recent rheumatology note.  Patient has been icing stretching wearing good shoes and orthotics.  Would like to try injection.  Risks complications, and efficacy of cortisone injection discussed.  Patient understands there is " a risk of plantar fascial rupture.  After written consent, sterile prep, injected heel with 1 cc 1% lidocaine plain and 1 cc kenalog 10 mg.  Continue good shoes at all times.  We dispensed heel cups today.  Subsecond capsulitis is still hurting.  He is wondering about an injection for this as well.  We will have the patient return to clinic in approximately a month and see if forfeit feeling better with injecting heels.       Peng Perez DPM, FACFAS

## 2023-09-22 NOTE — PATIENT INSTRUCTIONS
We wish you continued good healing. If you have any questions or concerns, please do not hesitate to contact us at  [phone]    [facility] (secure e-mail communication and access to your chart) to send a message or to make an appointment.    Please remember to call and schedule a follow up appointment if one was recommended at your earliest convenience.     PODIATRY CLINIC HOURS  TELEPHONE NUMBER    Dr. Peng UMANAPFRANCISCO FACFAS        Clinics:  [facility]  Tuesday 1PM-6PM  [place]  Wednesday 745AM-330PM  [place]  Monday 2nd,4th  830AM-4PM  Thursday/Friday 745AM-230PM     [facility] APPOINTMENTS  [phone]    [place] APPOINTMENTS  [phone]          If you need a medication refill, please contact us you may need lab work and/or a follow up visit prior to your refill (i.e. Antifungal medications).  If MRI needed please call Imaging at [phone]   HOW DO I GET MY KNEE SCOOTER? Knee scooters can be picked up at ANY Medical Supply stores with your knee scooter Prescription.  OR  Bring your signed prescription to an [facility] showroom.  Call or bring in your Orthotics order to any Orthotics locations. Or call [phone]         STERIOD INJECTIONS  The injection that you received today included a steroid. This is a strong steroid that decreases inflammation, reduces pain, and promotes healing. This type of steroid is different than anabolic steroids abused by body builders and professional athletes.   The injection also included a small dose of local anesthetic. This will result in local numbness for at least a few hours. The initial reduction in pain may simply be the effect of the anesthetic. The steroid will start to work as the local anesthetic is wearing off. It is hard to predict the degree of pain relief or the duration of benefit from a steroid injection. The injection may need to be repeated  depending on your body's response and ongoing pain and problems.   Some people experience a few days of increased pain after a steroid injection. This reaction is partly due to bleeding or bruising immediately after the injection. The localized pain may last for a few days and can be treated with extra strength tylenol, local ice, and decreased activity. The pain should resolve as the steroid starts to take effect which can take up to two weeks. Please do not hesitate to call if you continue having problems beyond what is described above.

## 2023-09-25 ENCOUNTER — VIRTUAL VISIT (OUTPATIENT)
Dept: RHEUMATOLOGY | Facility: CLINIC | Age: 48
End: 2023-09-25
Attending: INTERNAL MEDICINE
Payer: COMMERCIAL

## 2023-09-25 DIAGNOSIS — M45.9 ANKYLOSING SPONDYLITIS, UNSPECIFIED SITE OF SPINE (H): Primary | ICD-10-CM

## 2023-09-25 DIAGNOSIS — K57.90 DIVERTICULOSIS: ICD-10-CM

## 2023-09-25 DIAGNOSIS — M54.50 CHRONIC MIDLINE LOW BACK PAIN WITHOUT SCIATICA: ICD-10-CM

## 2023-09-25 DIAGNOSIS — K31.84 GASTROPARESIS: ICD-10-CM

## 2023-09-25 DIAGNOSIS — G89.29 CHRONIC MIDLINE LOW BACK PAIN WITHOUT SCIATICA: ICD-10-CM

## 2023-09-25 RX ORDER — METHYLPREDNISOLONE 4 MG
TABLET, DOSE PACK ORAL
Qty: 21 TABLET | Refills: 0 | Status: SHIPPED | OUTPATIENT
Start: 2023-09-25 | End: 2023-10-24

## 2023-09-25 RX ORDER — LIDOCAINE HYDROCHLORIDE 20 MG/ML
1 INJECTION, SOLUTION INFILTRATION; PERINEURAL
Status: DISCONTINUED | OUTPATIENT
Start: 2023-09-22 | End: 2024-09-16

## 2023-09-25 RX ORDER — DIPHENHYDRAMINE HCL 25 MG
25 CAPSULE ORAL WEEKLY
COMMUNITY

## 2023-09-25 RX ORDER — FEXOFENADINE HCL 180 MG/1
180 TABLET ORAL EVERY EVENING
COMMUNITY

## 2023-09-25 NOTE — Clinical Note
"9/25/2023       RE: Joel Pineda  [address] [place] [address]     Dear Colleague,    Thank you for referring your patient, Joel Pineda, to the [facility] [place] at [facility]. Please see a copy of my visit note below.    Medication Therapy Management (MTM) Encounter    ASSESSMENT:                            Medication Adherence/Access: {adherencechoices:998217}    ***:  ***      PLAN:                            ***    Follow-up: {followuptest2:568963}    SUBJECTIVE/OBJECTIVE:                          Tito Pineda is a 48 year old male { :231748} for {mtmvisit:843038}     Reason for visit: ***.    Allergies/ADRs: {1/2/3/4/5:951758}  Past Medical History: {1/2/3/4/5:812162}  Tobacco: He reports that he has never smoked. He has never used smokeless tobacco.  Alcohol: {ALCOHOL CONSUMPTION HX:487175}  {Social and Goals:758194}    Medication Adherence/Access: {fumedadherence:500812}    ***:   ***    Ablation planned  Lyrica - can we do three times daily dosing - 150 mg morning, 150 mg afternoon, 300 mg at night    Medrol dose pack - [place]  Started movantik - reducing reglan (stopped this)    Enbrel going well    Today's Vitals: There were no vitals taken for this visit.  ----------------  {RUTH?:706959}    I spent {mtm total time 3:944721} with this patient today. { :094800}. A copy of the visit note was provided to the patient's provider(s).    A summary of these recommendations {GIVEN/NOT GIVEN:292741}.    ***    Telemedicine Visit Details  Type of service:  {telemedvisitmtm:944823::\"Telephone visit\"}  Start Time: {video/phone visit start time:152948}  End Time: {video/phone visit end time:152948}     Medication Therapy Recommendations  No medication therapy recommendations to display       Medication Therapy Management (MTM) Encounter    ASSESSMENT:                            Medication Adherence/Access: No " issues identified    Ankylosing Spondylitis: Patient tolerating Enbrel injections well with pre-medications. Due for medication monitoring labs in November. Due to possible flare symptoms of pain and stiffness starting to worsen, will provide a Medrol dose pack prescription to keep on hand if flare worsens.    Pain: Reviewed a 300 mg dose of Lyrica is safe to take however encouraged patient to review plan to change Lyrica dosing with pain management provider at next visit. Discussed larger dose at night and a smaller dose during the day may help relieve daytime drowsiness symptoms.    Diverticulitis/Gastroparesis: Patient tolerating and finding good efficacy with Movantik. Would benefit from continuing medication and close follow up with GI specialist.    PLAN:                            1. I will send in a Medrol dose pack to keep on hand in case your flare worsens.    2. Make a lab appointment to complete Enbrel monitoring labs in November.    3. Discuss changing Lyrica dosing to 150 mg every morning and 300 mg every night with pain management specialist.    Follow-up: Return in 4 months (on 1/16/2024) for MTM Pharmacist Visit.    SUBJECTIVE/OBJECTIVE:                          Tito Pineda is a 48 year old male contacted via secure video for an initial visit. He was referred to me from Frantz Kaur MD.      Reason for visit: Enbrel management    Allergies/ADRs: Reviewed in chart  Past Medical History: Reviewed in chart  Tobacco: He reports that he has never smoked. He has never used smokeless tobacco.  Alcohol: not currently using    Medication Adherence/Access: Medication barriers: affording medications. Uses patient assistance programs for both Enbrel and Ozempic. Planning to reapply to these programs for 2024.    Ankylosing Spondylitis:  Enbrel 50 mg weekly    Enbrel pre-medications:  Benadryl 25 mg weekly (30 min prior to injection)  Famotidine 20 mg daily  Allegra 180 mg daily    Reports overall the Enbrel  is going well. Has seen significant symptom improvement after starting on it. Is allergic to latex (rash) so premedicates prior to giving weekly dose to reduce itching. No other side effects noted. Today feels more stiffness and pain in his back - feels this is the start of a flare. Requests a Medrol dose pack to have on hand in case symptoms begin to worsen more than this.    Liver Function Studies -   Recent Labs   Lab Test 05/05/23  1448   PROTTOTAL 7.1   ALBUMIN 4.7   BILITOTAL 0.2   ALKPHOS 70   AST 31   ALT 25     CBC RESULTS:   Recent Labs   Lab Test 05/05/23  1448   WBC 9.3   RBC 4.74   HGB 15.3   HCT 44.9   MCV 95   MCH 32.3   MCHC 34.1   RDW 12.2        Pain:   Acetaminophen 500 mg twice daily  Lyrica 150mg three times daily  Oxycodone 5mg three times daily as needed maximum of 3 tablets/day (usually takes 5-7.5 mg daily)  Methocarbamol 1000mg four times daily as needed  Nabumetone 1000mg twice daily  Narcan nasal spray as needed  Lidocaine ointment on neck and shoulders (2-3 times daily)  Risperidone 0.5 mg 1-2 times daily    Follows with pain provider every 2-3 months. Completed nerve blocks earlier this month - seems to be helping some. Has another nerve ablation planned with pain clinic. Migraines have improved with trigger point injections. Would like to know if it is safe to move afternoon Lyrica dose to night time (150 mg in morning, 300 mg at night). Feels very tired during the day and feels Lyrica side effect may be causing this.     Creatinine   Date Value Ref Range Status   05/05/2023 1.01 0.67 - 1.17 mg/dL Final   10/16/2020 1.28 (H) 0.66 - 1.25 mg/dL Final     Diverticulitis/Gastroparesis:  Movantik 12.5 mg daily  Dronabinol 5 mg twice daily as needed for nausea    Reports the Movantik has been working very well so far. Has been able to stop metoclopramide completely. Nausea mostly coming from opioids and Ozempic but dronabinol working well to control nausea and reports he has been able  to stay on both medications now.    Today's Vitals: There were no vitals taken for this visit.  ----------------    I spent 40 minutes with this patient today. All changes were made via collaborative practice agreement with Frantz Kaur. A copy of the visit note was provided to the patient's provider(s).    A summary of these recommendations was sent via [email or web address].    Martha ReedD  Medication Therapy Management Pharmacist  [facility]  Phone: [phone]    Telemedicine Visit Details  Type of service:  Video Conference via [email or web address]  Start Time: 2:00 PM  End Time: 2:40 PM     Medication Therapy Recommendations  No medication therapy recommendations to display         Again, thank you for allowing me to participate in the care of your patient.      Sincerely,    LATISHA YOUNG [facility]

## 2023-09-25 NOTE — PROGRESS NOTES
Medication Therapy Management (MTM) Encounter    ASSESSMENT:                            Medication Adherence/Access: No issues identified    Ankylosing Spondylitis: Patient tolerating Enbrel injections well with pre-medications. Due for medication monitoring labs in November. Due to possible flare symptoms of pain and stiffness starting to worsen, will provide a Medrol dose pack prescription to keep on hand if flare worsens.    Pain: Reviewed a 300 mg dose of Lyrica is safe to take however encouraged patient to review plan to change Lyrica dosing with pain management provider at next visit. Discussed larger dose at night and a smaller dose during the day may help relieve daytime drowsiness symptoms.    Diverticulitis/Gastroparesis: Patient tolerating and finding good efficacy with Movantik. Would benefit from continuing medication and close follow up with GI specialist.    PLAN:                            1. I will send in a Medrol dose pack to keep on hand in case your flare worsens.    2. Make a lab appointment to complete Enbrel monitoring labs in November.    3. Discuss changing Lyrica dosing to 150 mg every morning and 300 mg every night with pain management specialist.    Follow-up: Return in 4 months (on 1/16/2024) for MTM Pharmacist Visit.    SUBJECTIVE/OBJECTIVE:                          Tito Pineda is a 48 year old male contacted via secure video for an initial visit. He was referred to me from Frantz Kaur MD.      Reason for visit: Enbrel management    Allergies/ADRs: Reviewed in chart  Past Medical History: Reviewed in chart  Tobacco: He reports that he has never smoked. He has never used smokeless tobacco.  Alcohol: not currently using    Medication Adherence/Access: Medication barriers: affording medications. Uses patient assistance programs for both Enbrel and Ozempic. Planning to reapply to these programs for 2024.    Ankylosing Spondylitis:  Enbrel 50 mg weekly    Enbrel  pre-medications:  Benadryl 25 mg weekly (30 min prior to injection)  Famotidine 20 mg daily  Allegra 180 mg daily    Reports overall the Enbrel is going well. Has seen significant symptom improvement after starting on it. Is allergic to latex (rash) so premedicates prior to giving weekly dose to reduce itching. No other side effects noted. Today feels more stiffness and pain in his back - feels this is the start of a flare. Requests a Medrol dose pack to have on hand in case symptoms begin to worsen more than this.    Liver Function Studies -   Recent Labs   Lab Test 05/05/23  1448   PROTTOTAL 7.1   ALBUMIN 4.7   BILITOTAL 0.2   ALKPHOS 70   AST 31   ALT 25     CBC RESULTS:   Recent Labs   Lab Test 05/05/23  1448   WBC 9.3   RBC 4.74   HGB 15.3   HCT 44.9   MCV 95   MCH 32.3   MCHC 34.1   RDW 12.2        Pain:   Acetaminophen 500 mg twice daily  Lyrica 150mg three times daily  Oxycodone 5mg three times daily as needed maximum of 3 tablets/day (usually takes 5-7.5 mg daily)  Methocarbamol 1000mg four times daily as needed  Nabumetone 1000mg twice daily  Narcan nasal spray as needed  Lidocaine ointment on neck and shoulders (2-3 times daily)  Risperidone 0.5 mg 1-2 times daily    Follows with pain provider every 2-3 months. Completed nerve blocks earlier this month - seems to be helping some. Has another nerve ablation planned with pain clinic. Migraines have improved with trigger point injections. Would like to know if it is safe to move afternoon Lyrica dose to night time (150 mg in morning, 300 mg at night). Feels very tired during the day and feels Lyrica side effect may be causing this.     Creatinine   Date Value Ref Range Status   05/05/2023 1.01 0.67 - 1.17 mg/dL Final   10/16/2020 1.28 (H) 0.66 - 1.25 mg/dL Final     Diverticulitis/Gastroparesis:  Movantik 12.5 mg daily  Dronabinol 5 mg twice daily as needed for nausea    Reports the Movantik has been working very well so far. Has been able to stop  metoclopramide completely. Nausea mostly coming from opioids and Ozempic but dronabinol working well to control nausea and reports he has been able to stay on both medications now.    Today's Vitals: There were no vitals taken for this visit.  ----------------    I spent 40 minutes with this patient today. All changes were made via collaborative practice agreement with Frantz Kaur. A copy of the visit note was provided to the patient's provider(s).    A summary of these recommendations was sent via Antenova.    Carolynn Pruett, PharmD  Medication Therapy Management Pharmacist  [facility]  Phone: [phone]    Telemedicine Visit Details  Type of service:  Video Conference via Merlin  Start Time: 2:00 PM  End Time: 2:40 PM     Medication Therapy Recommendations  Ankylosing spondylitis of sacral region (H)    Current Medication: etanercept (ENBREL SURECLICK) 50 MG/ML autoinjector   Rationale: Synergistic therapy - Needs additional medication therapy - Indication   Recommendation: Start Medication - Medrol 4 MG Tbpk - Use pack if needed for AS flare   Status: Accepted per CPA         Chronic midline low back pain without sciatica    Current Medication: pregabalin (LYRICA) 150 MG capsule   Rationale: Undesirable effect - Adverse medication event - Safety   Recommendation: Change Administration Time - 150 mg every morning and 300 mg every night to reduce drowsiness - discuss with pain management specialist   Status: Patient Agreed - Adherence/Education

## 2023-09-28 ENCOUNTER — VIRTUAL VISIT (OUTPATIENT)
Dept: PALLIATIVE MEDICINE | Facility: OTHER | Age: 48
End: 2023-09-28
Attending: NURSE PRACTITIONER
Payer: MEDICARE

## 2023-09-28 DIAGNOSIS — G57.12 MERALGIA PARESTHETICA, LEFT LOWER LIMB: ICD-10-CM

## 2023-09-28 DIAGNOSIS — M51.369 DDD (DEGENERATIVE DISC DISEASE), LUMBAR: ICD-10-CM

## 2023-09-28 DIAGNOSIS — M45.8 ANKYLOSING SPONDYLITIS OF SACRAL REGION (H): ICD-10-CM

## 2023-09-28 DIAGNOSIS — G62.9 PERIPHERAL POLYNEUROPATHY: ICD-10-CM

## 2023-09-28 DIAGNOSIS — G89.29 CHRONIC INTRACTABLE PAIN: Primary | ICD-10-CM

## 2023-09-28 PROCEDURE — 99214 OFFICE O/P EST MOD 30 MIN: CPT | Mod: 95 | Performed by: NURSE PRACTITIONER

## 2023-09-28 RX ORDER — OXYCODONE HYDROCHLORIDE 5 MG/1
5 TABLET ORAL 3 TIMES DAILY PRN
Qty: 50 TABLET | Refills: 0 | Status: SHIPPED | OUTPATIENT
Start: 2023-09-28 | End: 2023-10-23

## 2023-09-28 ASSESSMENT — PAIN SCALES - GENERAL: PAINLEVEL: MODERATE PAIN (5)

## 2023-09-28 NOTE — PROGRESS NOTES
[facility]      Joel Pineda is a 48 year old male who is being evaluated via a billable virtual visit.      VIDEO VISIT   How would you like to obtain your AVS? MyChart  If you are dropped from the video visit, the video invite should be resent to: Text to cell phone: [phone]  Will anyone else be joining your video visit? NO,  Is patient CURRENTLY in [place]? YES  If patient encounters technical issues they should call [phone]    Video-Visit Details  Type of service:  Video Visit  Video Start Time: 10:42 AM  Video End Time: 10:57 AM  Total Face to Face Time: 15 minutes   Originating Location (pt. Location): Home  Distant Location (provider location):  [facility]   Platform used for Video Visit: AmWell            6/8/2023     3:18 PM 7/21/2023    12:50 PM 9/28/2023     9:03 AM   PEG Score   PEG Total Score 5 5 7.33          CHIEF COMPLAINT: Chronic pain    INTERVAL HISTORY:  Last seen on 7/21/23.        Recommendations/plan at the last visit included:  30 minutes Video or Clinic follow-up with JOCELYN Zavala NP-C in 2 months. Ok to schedule up to three follow up appts at a time alternating clinic and virtual   Procedures recommended:   Trigger Points and Bilateral Lateral femoral cutaneous nerve block. Schedule two separate appointments with Dr Montgomery           Medication Management :   Refills of multiple pain related medications sent in   Oxycodone 5 mg sent to be filled July 21, 2023    Since last visit:   - Having a pain flare, hard to change positions, get up and down, currently on a medrol dose pack which is helping.   - 9/5/23:  Bilateral Lateral femoral cutaneous nerve block: unsure if he would do again, still has some pain from injections, but plantar pain is better     Pain Information Today: Score: Moderate Pain (5)/10. Location of pain: widespread pain flare    Annual Requirements last collected:  6/8/23 UDS, CSA 7/21/23      Current Pain Relevant Medications:    Acetaminophen 500 mg BID & with Oxycodone.   Cymbalta 120 mg in AM  Lyrica 150 mg BID  Methocarbamol 500 mg 1-2 QID PRN  Nabumetone 500 mg 1-2 times a day  Lidocaine gel topically 2-3 times daily  Risperdal 0.5 mg 1-2 x daily      Pain Related Controlled Substances:   Clonazepam 0.5 mg, 2 tabs at HS.  Lunesta 3 mg  Oxycodone 5 mg 1-2 tabs per day PRN              Total opiate dose: 7.5-15 MME     Previous Pain Relevant Medications: (H--helped; HI--Helped initially; SWH--Somewhat helpful; NH--No help; W--worse; SE--side effects; ?--Unsure if helpful)   NOTE: This medication information taken from patient's intake form, not medical records.   Opiates: Oxycodone: H, Tramadol:[facility]. SE, Codeine: NH  NSAIDS: Celebrex: [facility], Nabumetone:H, Naproxen: SE  Anti-migraine medications: Midrin? , Maxalt:H  Muscle Relaxants: Baclofen:[facility], Flexeril:H, Tizaidine:?, Methocarbamol:?  Neuropathics: Lyrica:H  Anti-depressants: Abilify:SE, Brintellix: NH, Wellbutrin: ?, Cymbalta: NH, Nortriptyline: NH, Seroquel:   [facility], Risperdal: NH, Effexor:?, Cymbalta ?  Anxiety medications: Xanax: H, Klonpin: H, lorazepam: H      Topicals: Lidocaine:H  Sleep medications:Belsomra: NH, Melatonin:H, Trazodone NH, Ambien:NH  Other medications not covered above: Humira: All, Enbrel; H, dicyclomine:H, Medrol:[facility], Prednisone:H     Any illicit drug use: denies   EtOH use: none   Caffeine use: 6-8 per day   Nicotine use: None     Past Pain Treatments:   Pain Clinic:   Yes  [facility] pain management: For spinal injections with Dr Diaz, [facility]: injections, nerve ablation, [facility] for SCS treatment.  Did trial but did not find it beneficial.   [facility] chronic pain program.    PT: Yes  For other reasons. Neck, back and feet  Psychologist: Yes ongoing with private therapist.at  [facility].   Chiropractor: Yes years ag              Acupuncture: Yes NH  Pharmacotherapy:  Opioids: Yes  Non-opioids:    Yes   TENs Unit:Yes H for  back   Injections: Yes Many for neck and back      Surgeries related to pain: Yes   October 2007 L5-S1 laminectomy, micro discectomy          THE 4 As OF OPIOID MAINTENANCE ANALGESIA    Analgesia: Is pain relief clinically significant? YES   Activity: Is patient functional and able to perform Activities of Daily Living? YES   Adverse effects: Is patient free from adverse side effects from opiates? YES   Adherence to Rx protocol: Is patient adhering to Controlled Substance Agreement and taking medications ONLY as ordered? YES     Is Narcan prescribed for opiate use >50 MME daily or concurrent use of opiates and benzodiazepines?  Yes prescribed 4/18/23    [place] Board of Pharmacy Data Base Reviewed:    YES; As expected, no concern for misuse/abuse of controlled medications based on this report. Reviewed [facility] September 27, 2023- no concerning fills.    _______________________________________________      Physical Exam and Vital Signs not completed due to virtual visit.     Constitutional: healthy, alert and no distress A&O.   Patient is appropriate.  Psychiatric/mental status: Alert, without lethargy or stupor. Appropriate affect.      Neurologic exam:  CN:  Cranial nerves 2-12 are grossly normal.     MUSCULOSKELETAL:      Posture: Upright, shoulders and pelvis are leveled. Yes  Antalgic Gait Pattern?: Yes      DIRE Score for ongoing opioid management is calculated as follows:    Diagnosis = 3 pts (advanced condition; severe pain/objective findings)    Intractability = 3 pts (patient fully engaged but inadequate response to treatments)    Risk        Psych = 3 pts (no significant personality dysfunction/mental illness; good communication with clinic)         Chem Hlth = 3 pts (no history of chemical dependency; not drug-focused)       Reliability = 2 pts (occasional difficulties with compliance; generally reliable)       Social = 2 pts (reduction in some relationships/life rolls)       (Psych + Chem hlth +  Reliability + Social) = 16    Efficacy = 2 pts (moderate benefit/function; low med dose; too early/not tried meds)    DIRE Score = 18        7-13: likely NOT suitable candidate for long-term opioid analgesia       14-21: may be a suitable candidate for long-term opioid analgesia     Previous Diagnostic Tests:   Imaging Studies:   MR LUMBAR SPINE W/O CONTRAST 6/27/2019  Impression:   1. Multilevel lumbar spondylosis, most pronounced at L5-S1 with  moderate to severe left and moderate right neural foraminal stenosis as well as narrowing of the left lateral recess and abutment the traversing left S1 nerve root.   2. Additional multilevel degenerative changes of the lumbar spine as described above.     PAIN RELEVANT CONDITIONS:   1.  Postherpetic neuralgia  2.  Ankylosing spondylosis, Lumbar DDD with left S1 nerve involvement, lumbar stenosis  3.  Chronic migraine headaches  4.  History: Complex medical issues, see history    ASSESSMENT AND PLAN    (G89.29) Chronic intractable pain  (primary encounter diagnosis)  (G62.9) Peripheral polyneuropathy  (G57.12) Meralgia paresthetica, left lower limb  (M51.36) DDD (degenerative disc disease), lumbar  (M45.8) Ankylosing spondylitis of sacral region (H)  Comment: Struggling with flare, slowly getting better, will notify Dr Lyles that I will be taking over all pain related meds. Follow up as scheduled.   Plan: oxyCODONE (ROXICODONE) 5 MG tablet      PATIENT INSTRUCTIONS:     Diagnosis reviewed, treatment option addressed, and risk/benefits discussed.  Self-care instructions given.  I am recommending a multidisciplinary treatment plan to help this patient better manage pain.    Remember to request ALL medication refills 5-7 BUSINESS days before you run out.     Health Psychology: YES, Continue per your psychologist's recommendations.  Physical therapy: N/A, Continue to get movement, walking in daily as we discussed    30 minute Video or Clinic follow-up with Stacia Jack  APRN, NP-C on 11/30/23 as scheduled   Ok to schedule up to three follow up appts at a time, alternating virtual and clinic appointments.   Medication Management : Oxycodone refill sent to your pharmacy    I have reviewed the note as documented above.  This accurately captures the substance of my conversation with the patient.    BILLING TIME DOCUMENTATION:   TOTAL TIME on the date of service includes:   Time spent preparing to see the patient: 2 minutes (reviewing records and tests)  Time spend face to face with the patient: 15 minutes  Time spent ordering tests, medications, procedures and referrals: 0 minutes  Time spent Referring and communicating with other healthcare professionals: 0 minutes  Documenting clinical information in Epic: 4 minutes    The total TIME spent on this patient on the day of the appointment was 21 minutes.     JOCELYN Padgett NP-C  [facility]    (Information in italics and blue color are taken from previous pain and consulting medical providers notes and are documented as such)

## 2023-09-29 NOTE — TELEPHONE ENCOUNTER
Injection is scheduled for 12/1.  No  further intervention required.    Eloisa, RN-BSN  [facility] [place]   [phone]

## 2023-10-03 ENCOUNTER — VIRTUAL VISIT (OUTPATIENT)
Dept: PALLIATIVE MEDICINE | Facility: CLINIC | Age: 48
End: 2023-10-03
Payer: MEDICARE

## 2023-10-03 DIAGNOSIS — G57.12 MERALGIA PARESTHETICA, LEFT LOWER LIMB: ICD-10-CM

## 2023-10-03 DIAGNOSIS — G62.9 PERIPHERAL POLYNEUROPATHY: ICD-10-CM

## 2023-10-03 DIAGNOSIS — M51.369 DDD (DEGENERATIVE DISC DISEASE), LUMBAR: ICD-10-CM

## 2023-10-03 DIAGNOSIS — M45.8 ANKYLOSING SPONDYLITIS OF SACRAL REGION (H): ICD-10-CM

## 2023-10-03 DIAGNOSIS — G89.29 CHRONIC INTRACTABLE PAIN: Primary | ICD-10-CM

## 2023-10-03 PROCEDURE — 96158 HLTH BHV IVNTJ INDIV 1ST 30: CPT | Mod: 95 | Performed by: PSYCHOLOGIST

## 2023-10-03 PROCEDURE — 96159 HLTH BHV IVNTJ INDIV EA ADDL: CPT | Mod: 95 | Performed by: PSYCHOLOGIST

## 2023-10-03 RX ORDER — DRONABINOL 5 MG/1
5 CAPSULE ORAL 2 TIMES DAILY PRN
COMMUNITY
End: 2023-12-11

## 2023-10-03 NOTE — PROGRESS NOTES
Joel Pineda is a 48 year old male who is being evaluated via a billable video visit.      Patient is currently in the [facility]? yes    Patient would like the video invitation sent by: Text to cell phone: [phone]}    Video Start Time: 4:00 PM  Video Stop Time: 4:47 PM    Additional provider notes:     Pain Diagnoses per pain provider:   Chronic intractable pain    DDD (degenerative disc disease), lumbar    Ankylosing spondylitis of sacral region (H)    Peripheral polyneuropathy    Meralgia paresthetica, left lower limb        DATA: During today's visit you reported the following: Your pain is higher than you'd like. Your mood is about the same - still taking more Risperdal than you'd like. Your activity level is mildly reduced. Your stress level 'ebbs and flows'. Your sleep is stable. You reported engaging in self-care for your pain 2-3 times daily.    You identified that you would like to focus on the following or had questions regarding the following issues or concerns, and we discussed the following:   - started prednisone burst, not helping quite yet  - discussed staying on top of the pain, listening to cues from your body  - noting benefit of Ozempic for weight loss and neuropathy  - worried about side effects from supplement psychiatrist suggested - will talk with him about it  - you recognize you are struggling with eye-contact, validated your awareness and allowed space  - explored current self-care  - explored some negative self-talk and ways to reframe into more positive self-awareness statements (e.g., cancelling an appointment for research study due to pain and acknowledging this is good self-care in doing do)  - Discussed examples of ways to 'hack happiness':      ASSESSMENT: Tito seems to be moderating his physical activity in response to a pain flare. We discussed honoring cues from his body and focusing on engaging in self-care as often as needed.     PLAN:   Your next  appointment is scheduled for 10/17 at 3:00 PM.  Assignment/Objectives /interventions for next session:   - continue to focus on self-care  - check out 'hacking happiness chemicals' and note what you are already doing that would release them    We believe regular attendance is key to your success in our program!    Any time you are unable to keep your appointment we ask that you call us at [phone] at least 24 hours in advance to cancel.This will allow us to offer the appointment time to another patient.   Multiple missed appointments may lead to dismissal from the clinic.    Telemedicine Visit: The patient's condition can be safely assessed and treated via synchronous audio and visual telemedicine encounter.      Reason for Telemedicine Visit: Services only offered telehealth    Originating Site (Patient Location): Patient's home    Distant Site (Provider Location): Provider Remote Setting- Home Office    Consent:  The patient/guardian has verbally consented to: the potential risks and benefits of telemedicine (video visit) versus in person care; bill my insurance or make self-payment for services provided; and responsibility for payment of non-covered services.     Mode of Communication:  Video Conference via VoiceGem    As the provider I attest to compliance with applicable laws and regulations related to telemedicine.      Shirley Bartlett PsyD LP  Licensed Psychologist  Outpatient Clinic Therapist  [facility]

## 2023-10-04 NOTE — PATIENT INSTRUCTIONS
"Recommendations from today's MTM visit:                                                       1. I will send in a Medrol dose pack to keep on hand in case your flare worsens.    2. Make a lab appointment to complete Enbrel monitoring labs in November.    3. Discuss changing Lyrica dosing to 150 mg every morning and 300 mg every night with pain management specialist.    Follow-up: Return in 4 months (on 1/16/2024) for MTM Pharmacist Visit.    It was great speaking with you today.  I value your experience and would be very thankful for your time in providing feedback in our clinic survey. In the next few days, you may receive an email or text message from [facility] frents with a link to a survey related to your  clinical pharmacist.\"     To schedule another MTM appointment, please call the clinic directly or you may call the MTM scheduling line at [phone] or toll-free at [phone].     My Clinical Pharmacist's contact information:                                                      Please feel free to contact me with any questions or concerns you have.      Carolynn Pruett, PharmD  Medication Therapy Management Pharmacist  [facility]  Phone: [phone]     "

## 2023-10-09 ENCOUNTER — MYC MEDICAL ADVICE (OUTPATIENT)
Dept: PALLIATIVE MEDICINE | Facility: OTHER | Age: 48
End: 2023-10-09

## 2023-10-09 ENCOUNTER — CARE COORDINATION (OUTPATIENT)
Dept: SLEEP MEDICINE | Facility: CLINIC | Age: 48
End: 2023-10-09
Payer: MEDICARE

## 2023-10-09 NOTE — PROGRESS NOTES
Called and chatted with the patient to let him know its absolutely fine for him to back out on the NAPS research study and to see how he is doing. He is struggling with pain and after some discussion he indicated he would reach out directly to his pain clinic today and in addition he has been trying to get a hold of Dr Khan.  I asked Tito to send a Keepsafe message to Dr Khan today and I have gone ahead and messaged Dr Khan directly that the patient is trying get a hold of him and that there will be a [place] message forth coming.     Tito agreed with this plan and will let us know if there is anything else we can do for him.

## 2023-10-10 NOTE — TELEPHONE ENCOUNTER
Patient is on Lyrica 150 MG tid and would like a titration schedule for increasing the dose if needed. Routing to provider to review.     Radha Bustos, JAVONN, RN  Care Coordinator  [facility] [place]

## 2023-10-17 ENCOUNTER — VIRTUAL VISIT (OUTPATIENT)
Dept: PALLIATIVE MEDICINE | Facility: CLINIC | Age: 48
End: 2023-10-17
Payer: MEDICARE

## 2023-10-17 DIAGNOSIS — G57.12 MERALGIA PARESTHETICA, LEFT LOWER LIMB: ICD-10-CM

## 2023-10-17 DIAGNOSIS — G89.29 CHRONIC INTRACTABLE PAIN: Primary | ICD-10-CM

## 2023-10-17 DIAGNOSIS — M45.8 ANKYLOSING SPONDYLITIS OF SACRAL REGION (H): ICD-10-CM

## 2023-10-17 DIAGNOSIS — M51.369 DDD (DEGENERATIVE DISC DISEASE), LUMBAR: ICD-10-CM

## 2023-10-17 DIAGNOSIS — G62.9 PERIPHERAL POLYNEUROPATHY: ICD-10-CM

## 2023-10-17 PROCEDURE — 96158 HLTH BHV IVNTJ INDIV 1ST 30: CPT | Mod: 95 | Performed by: PSYCHOLOGIST

## 2023-10-17 PROCEDURE — 96159 HLTH BHV IVNTJ INDIV EA ADDL: CPT | Mod: 95 | Performed by: PSYCHOLOGIST

## 2023-10-17 NOTE — PROGRESS NOTES
Joel Pineda is a 48 year old male who is being evaluated via a billable video visit.      Patient is currently in the [facility]? yes    Patient would like the video invitation sent by: Text to cell phone: [phone]}    Video Start Time: 3:00 PM  Video Stop Time: 3:55 PM    Additional provider notes:     Pain Diagnoses per pain provider:   Chronic intractable pain     DDD (degenerative disc disease), lumbar     Ankylosing spondylitis of sacral region (H)     Peripheral polyneuropathy     Meralgia paresthetica, left lower limb            DATA: During today's visit you reported the following: Your pain is mildly to moderately increased. Your mood is more variable since ending steroid burst. Your activity level is decreased since prednisone burst ended and pain increased. Your stress level is high. Your sleep is unchanged. You reported engaging in self-care for your pain 1-3 times daily.    You identified that you would like to focus on the following or had questions regarding the following issues or concerns, and we discussed the following:   - sister was in town, concerned about her anxiety  - started increased dose of Lyrica - does impact ability to speak, increases brain fog  - wish RFA were sooner, on waitlist  - asserted yourself with father  - father gifted you a plane ticket to visit in [place] - discussed you don't have to decide today  - discussed that mood could be impacted by     ASSESSMENT: Onurs pain is elevated after coming off prednisone burst; his mood is also worse, likely as a result of the prednisone burst and increased pain. We worked on challenging negative self-talk and focusing on present moment, which he seems to be actively doing to manage his mood and pain.    PLAN:   Your next appointment is scheduled for 10/31 at 3:00 PM.  Assignment/Objectives /interventions for next session:   - continue to focus on balance of activity for yourself  - continue to engage in self-care as  often as needed    We believe regular attendance is key to your success in our program!    Any time you are unable to keep your appointment we ask that you call us at [phone] at least 24 hours in advance to cancel.This will allow us to offer the appointment time to another patient.   Multiple missed appointments may lead to dismissal from the clinic.    Telemedicine Visit: The patient's condition can be safely assessed and treated via synchronous audio and visual telemedicine encounter.      Reason for Telemedicine Visit: Services only offered telehealth    Originating Site (Patient Location): Patient's home    Distant Site (Provider Location): Provider Remote Setting- Home Office    Consent:  The patient/guardian has verbally consented to: the potential risks and benefits of telemedicine (video visit) versus in person care; bill my insurance or make self-payment for services provided; and responsibility for payment of non-covered services.     Mode of Communication:  Video Conference via Real Time Tomography    As the provider I attest to compliance with applicable laws and regulations related to telemedicine.      Shirley Bartlett PsyD LP  Licensed Psychologist  Outpatient Clinic Therapist  [facility]

## 2023-10-23 ENCOUNTER — MYC MEDICAL ADVICE (OUTPATIENT)
Dept: PALLIATIVE MEDICINE | Facility: OTHER | Age: 48
End: 2023-10-23
Payer: MEDICARE

## 2023-10-23 DIAGNOSIS — M51.369 DDD (DEGENERATIVE DISC DISEASE), LUMBAR: ICD-10-CM

## 2023-10-23 DIAGNOSIS — M45.8 ANKYLOSING SPONDYLITIS OF SACRAL REGION (H): ICD-10-CM

## 2023-10-23 RX ORDER — OXYCODONE HYDROCHLORIDE 5 MG/1
5 TABLET ORAL 3 TIMES DAILY PRN
Qty: 50 TABLET | Refills: 0 | Status: SHIPPED | OUTPATIENT
Start: 2023-10-23 | End: 2023-11-22

## 2023-10-23 NOTE — TELEPHONE ENCOUNTER
Please process a refill of oxycodone 5 MG  to:    [id number] - [place], [place] - [address]  [address]  [place] [address]  Phone: [phone] Fax: [phone]

## 2023-10-23 NOTE — TELEPHONE ENCOUNTER
Received refill request for:    oxyCODONE (ROXICODONE) 5 MG tablet      Last dispensed from pharmacy on 9/30/2023.    Patient's last office/virtual visit by prescribing provider on 9/28/22023.  Next office/virtual appointment scheduled for 11/30/2023.    Last urine drug screen date 6/8/2023.  Current opioid agreement on file? Yes Date of opioid agreement: 6/8/2023.    E-prescribe to:     [id number] - [place], [place] - [address]    Will route to nursing Lane for review and preparation of prescription(s).

## 2023-10-23 NOTE — PROGRESS NOTES
Medication Therapy Management (MTM) Encounter    ASSESSMENT:                            Medication Adherence/Access: No issues reported    Pain/Constipation: Stable. Plan in place with Pain Clinic.    Diabetes: Stable. meeting A1c goal of less than 7%. Follows with endocrinology. Would benefit from updated microalbumin-order placed.    Asthma: Stable. Updated Asthma Action Plan.    Ankylosing Spondylitis: Stable. Plan in place with rheumatology and rheumatology MTM.    Mood: Stable. Follows closely with psychiatry.    PLAN:                            No medication changes recommended today.    Follow-up: 3 months    SUBJECTIVE/OBJECTIVE:                          Joel Pineda is a 48 year old male called for a follow up visit. He was referred to me from Ksenia Lyles. Follow up from 7/25/2023.     Reason for visit: Medication Review.    Allergies/ADRs: Reviewed in chart  Past Medical History: Reviewed in chart  Tobacco: He reports that he has never smoked. He has never used smokeless tobacco.  Alcohol: none  Caffeine: 4-5 cups of coffee/day  Activity: None    Medication Adherence/Access: no issues reported  Patient uses pill box(es).  Patient qualifies for [facility] program for Enbrel (delivers to his house) and Ozempic (ships to [facility] endocrinology clinic)    Pain/Constipation:  Oxycodone 5mg can take up to three times daily but usually takes 1.5 a day  Pregabalin 150mg three times daily-does make his vision a little blurry and sometimes have a hard time finding words but it is helpful.   Lidocaine 5% ointment twice daily as needed  Methocarbamol 500-1000mg four time daily( usually 1 three time daily)  Nabumetone 500-1000mg twice daily as needed (usually taking 1 once to twice daily)   Movantik 12.5mg before breakfast-this is effective  Miralax once a week  Marinol twice a week vs. Twice  a day since starting  Movantik    Pain is better controlled  Has been working with Pain Management (provider, therapist)   in [place] and feel this has been helpful    Diabetes/Obesity:   Ozempic 1mg weekly.  Ozempic is helping with weight loss; thinks he has had about 10% weight loss  Is working with Dr. Cuenca about increasing dose of 2mg. Has been tolerating ok with his gastroparesis.  SMBG: rarely.    Recent symptoms of low blood sugar? Does set his alarm to remind him to eat.   Recent symptoms of high blood sugar? none  Eye exam: has appt scheduled this week  Foot exam: due  ACEi/ARB: Yes: Ramipril.   Urine Albumin:   Lab Results   Component Value Date    MICROL 9 09/23/2022    MICROL 8 10/14/2020     POCT A1c was 5.5% in June     Asthma:   Advair 45-21mcg 2 puff twice a day (Symbicort, Stiolo and Advair discus caused hoarse voice)  Montelukast (Singulair) once daily  Short-Acting Bronchodilator: Albuterol MDI- can't remember the last time he used it.  Triggers include: cold air and pollutants.   Patient reports the following symptoms: increased need of albuterol.  Is going to be following up with ENT because of his hoarse voice.   Asthma Action Plan on file: NO      6/9/2023    10:04 AM 8/3/2023     7:21 PM 9/6/2023     9:00 AM   ACT Total Scores   ACT TOTAL SCORE (Goal Greater than or Equal to 20) 19 20 23   In the past 12 months, how many times did you visit the emergency room for your asthma without being admitted to the hospital? 0 0 0   In the past 12 months, how many times were you hospitalized overnight because of your asthma? 0 0 0     Ankylosing Spondylitis:  Enbrel 50mcg once a week.   Diphenhydramine 25mg prior to Enbrel injection    Established care with Dr. Kaur  Feels the Enbrel is helping.   Met with Rheumatology PharmD and found  this helpful    Mood/Insomnia:  Duloxetine 120mg daily  Bupropion XL 300mg daily  Risperidone 0.5mg twice daily as needed-has been taking 1 tablet every other day  Clonazepam 1mg at bedtime for RBD  Lunesta 3mg at bedtime    Has been using more risperidone-not sure if this is due to  life events  Sleep has been better  Follows  with Dr. CHENEY at [facility]. Therapy  through pain management.  Goes to bed around 10pm and wakes up at 7am to take Movantik and then goes back to sleep until 830 or 9am.    Today's Vitals: There were no vitals taken for this visit.  ----------------    I spent 27 minutes with this patient today. All changes were made via collaborative practice agreement with Ksenia Lyles. A copy of the visit note was provided to the patient's provider(s).    A summary of these recommendations was sent via [employer].    Radha Mcdonald, Pharm.D, BCACP  Medication Therapy Management Pharmacist    Telemedicine Visit Details  Type of service:  Telephone visit  Start Time: 11:01AM  End Time: 11:28AM     Medication Therapy Recommendations  No medication therapy recommendations to display

## 2023-10-23 NOTE — TELEPHONE ENCOUNTER
Medication refill information reviewed.     Due date for oxyCODONE (ROXICODONE) 5 MG tablet   is 10/30/23     Prescriptions prepped for review.     Will route to provider.

## 2023-10-24 ENCOUNTER — VIRTUAL VISIT (OUTPATIENT)
Dept: PHARMACY | Facility: CLINIC | Age: 48
End: 2023-10-24
Payer: COMMERCIAL

## 2023-10-24 DIAGNOSIS — G47.00 INSOMNIA, UNSPECIFIED TYPE: ICD-10-CM

## 2023-10-24 DIAGNOSIS — F41.8 DEPRESSION WITH ANXIETY: ICD-10-CM

## 2023-10-24 DIAGNOSIS — M45.8 ANKYLOSING SPONDYLITIS OF SACRAL REGION (H): ICD-10-CM

## 2023-10-24 DIAGNOSIS — E11.9 TYPE 2 DIABETES MELLITUS WITHOUT COMPLICATION, WITHOUT LONG-TERM CURRENT USE OF INSULIN (H): Primary | ICD-10-CM

## 2023-10-24 DIAGNOSIS — K59.00 CONSTIPATION: ICD-10-CM

## 2023-10-24 DIAGNOSIS — J45.30 MILD PERSISTENT ASTHMA WITHOUT COMPLICATION: ICD-10-CM

## 2023-10-24 DIAGNOSIS — G89.4 CHRONIC PAIN SYNDROME: ICD-10-CM

## 2023-10-24 DIAGNOSIS — K59.00 CONSTIPATION, UNSPECIFIED CONSTIPATION TYPE: ICD-10-CM

## 2023-10-24 PROCEDURE — 99207 PR NO CHARGE LOS: CPT | Performed by: PHARMACIST

## 2023-10-24 NOTE — PATIENT INSTRUCTIONS
"Recommendations from today's MTM visit:                                                         No medication changes recommended today.    Follow-up: 3 months    It was great speaking with you today.  I value your experience and would be very thankful for your time in providing feedback in our clinic survey. In the next few days, you may receive an email or text message from [facility] with a link to a survey related to your  clinical pharmacist.\"     To schedule another MTM appointment, please call the clinic directly or you may call the MTM scheduling line at [phone] or toll-free at [phone].     My Clinical Pharmacist's contact information:                                                      Please feel free to contact me with any questions or concerns you have.      Radha Mcdonald, Pharm.D, [facility]  Medication Therapy Management Pharmacist      "

## 2023-10-24 NOTE — COMMUNITY RESOURCES LIST (ENGLISH)
10/24/2023   [facility]  N/A  For questions about this resource list or additional care needs, please contact your primary care clinic or care manager.  Phone: [phone]   Email: N/A   Address: [address] [place] [address]   Hours: N/A        Transportation       Free or low-cost transportation  1  Door [employer] Transportation Distance: 11.84 miles      In-Person   [address] [place] [address]  Language: English  Hours: Mon - Fri 6:00 AM - 6:00 PM  Fees: Insurance, Self Pay   Phone: (803) 744-6116 Email: info@[employer] Website: http://www.[employer]     2  [facility] - Bus Passes - Free or low-cost transportation Distance: 14.98 miles      In-Person   [address] [place] [address]  Language: English  Hours: Tue 9:30 AM - 11:30 AM , Thu 9:30 AM - 11:30 AM  Fees: Free   Phone: (812) 969-9989 Email: info@[employer] Website: http://www.[employer]/     Transportation to medical appointments  3  [facility]  [employer] (AIFW) Distance: 8.15 miles      In-Person   [address] [place] [address]  Language: French, Croatian, English, Gujarati, Ileana, Divehi, Mohawk, Serbian, German, English  Hours: Mon - Wed 9:00 AM - 5:00 PM , Thu 12:00 PM - 6:00 PM , Fri 9:00 AM - 5:00 PM , Sun 10:30 AM - 2:00 PM Appt. Only  Fees: Free   Phone: (532) 243-3229 Email: info@sewa-aifw.org Website: https://www.sewa-aifw.org/     4  [employer] Transportation Distance: 8.21 miles      In-Person   [address] [facility] [address] [place] [address]  Language: English  Hours: Mon - Sat 4:00 AM - 6:00 PM  Fees: Insurance, Self Pay   Phone: (216) 872-1670 Email: reiighttransportation1@Therapydia.[employer] Website: https://ClearRiskonnect.Garden [employer].[facility]./HelpMeConnect/Providers/Delight_Transportation/Transportation/2?returnUrl=%2FHelpMeConnect%2FSearch%2FBasicNeeds%2FTransportation%2FTransportationServices%3Fstart%3D40          Important  Numbers & Websites       Emergency Services   911  [facility] Services   311  Poison Control   (170) 804-9152  Suicide Prevention Lifeline   (171) 540-2387 (TALK)  Child Abuse Hotline   (662) 211-6443 (4-A-Child)  Sexual Assault Hotline   (122) 160-5852 (HOPE)  National Runaway Safeline   (883) 407-2586 (RUNAWAY)  All-Options Talkline   (696) 653-1728  Substance Abuse Referral   (860) 838-4098 (HELP)

## 2023-10-24 NOTE — LETTER
My Asthma Action Plan    Name: Joel Pineda   YOB: 1975  Date: 10/24/2023   My doctor: Radha Mcdonald [facility]   My clinic: [facility]        My Control Medicine: Fluticasone propionate + salmeterol (Advair HFA) -  45/21 mcg 2 puffs twice daily  Montelukast (Singulair) -  10 mg daily  My Rescue Medicine: Albuterol nebulizer solution as needed  Albuterol (Proair/Ventolin/Proventil HFA) 2-4 puffs EVERY 4 HOURS as needed. Use a spacer if recommended by your provider.  My Oral Steroid Medicine: None My Asthma Severity:   Intermittent / Exercise Induced  Know your asthma triggers:     cough and mask            GREEN ZONE   Good Control  I feel good  No cough or wheeze  Can work, sleep and play without asthma symptoms       Take your asthma control medicine every day.     If exercise triggers your asthma, take your rescue medication  15 minutes before exercise or sports, and  During exercise if you have asthma symptoms  Spacer to use with inhaler: If you have a spacer, make sure to use it with your inhaler             YELLOW ZONE Getting Worse  I have ANY of these:  I do not feel good  Cough or wheeze  Chest feels tight  Wake up at night   Keep taking your Green Zone medications  Start taking your rescue medicine:  every 20 minutes for up to 1 hour. Then every 4 hours for 24-48 hours.  If you stay in the Yellow Zone for more than 12-24 hours, contact your doctor.  If you do not return to the Green Zone in 12-24 hours or you get worse, start taking your oral steroid medicine if prescribed by your provider.           RED ZONE Medical Alert - Get Help  I have ANY of these:  I feel awful  Medicine is not helping  Breathing getting harder  Trouble walking or talking  Nose opens wide to breathe       Take your rescue medicine NOW  If your provider has prescribed an oral steroid medicine, start taking it NOW  Call your doctor NOW  If you are still in the Red Zone after 20 minutes and  you have not reached your doctor:  Take your rescue medicine again and  Call 911 or go to the emergency room right away    See your regular doctor within 2 weeks of an Emergency Room or Urgent Care visit for follow-up treatment.          Annual Reminders:  Meet with Asthma Educator,  Flu Shot in the Fall, consider Pneumonia Vaccination for patients with asthma (aged 19 and older).    Pharmacy:    [facility] # [id number] - [place], [place] - [address]  [facility] [address]  [place] [facility] - [place] [address]  [facility] [address] [facility] COMPOUNDING PHARMACY - [place] [address]  [facility] #[id number] - [place], [place] - [address] [facility] [address] [facility][id number] - [place] - [address] AT [facility] [address]  [facility] [place], [place] - [address] [facility]    Electronically signed by Radha Mcdonald [facility]   Date: 10/24/23                      Asthma Triggers  How To Control Things That Make Your Asthma Worse    Triggers are things that make your asthma worse.  Look at the list below to help you find your triggers and what you can do about them.  You can help prevent asthma flare-ups by staying away from your triggers.      Trigger                                                          What you can do   Cigarette Smoke  Tobacco smoke can make asthma worse. Do not allow smoking in your home, car or around you.  Be sure no one smokes at a child s day care or school.  If you smoke, ask your health care provider for ways to help you quit.  Ask family members to quit too.  Ask your health care provider for a referral to Quit Plan to help you quit smoking, or call 8-386-473-PLAN.     Colds, Flu, Bronchitis  These are common triggers of  asthma. Wash your hands often.  Don t touch your eyes, nose or mouth.  Get a flu shot every year.     Dust Mites  These are tiny bugs that live in cloth or carpet. They are too small to see. Wash sheets and blankets in hot water every week.   Encase pillows and mattress in dust mite proof covers.  Avoid having carpet if you can. If you have carpet, vacuum weekly.   Use a dust mask and HEPA vacuum.   Pollen and Outdoor Mold  Some people are allergic to trees, grass, or weed pollen, or molds. Try to keep your windows closed.  Limit time out doors when pollen count is high.   Ask you health care provider about taking medicine during allergy season.     Animal Dander  Some people are allergic to skin flakes, urine or saliva from pets with fur or feathers. Keep pets with fur or feathers out of your home.    If you can t keep the pet outdoors, then keep the pet out of your bedroom.  Keep the bedroom door closed.  Keep pets off cloth furniture and away from stuffed toys.     Mice, Rats, and Cockroaches   Some people are allergic to the waste from these pests.   Cover food and garbage.  Clean up spills and food crumbs.  Store grease in the refrigerator.   Keep food out of the bedroom.   Indoor Mold  This can be a trigger if your home has high moisture. Fix leaking faucets, pipes, or other sources of water.   Clean moldy surfaces.  Dehumidify basement if it is damp and smelly.   Smoke, Strong Odors, and Sprays  These can reduce air quality. Stay away from strong odors and sprays, such as perfume, powder, hair spray, paints, smoke incense, paint, cleaning products, candles and new carpet.   Exercise or Sports  Some people with asthma have this trigger. Be active!  Ask your doctor about taking medicine before sports or exercise to prevent symptoms.    Warm up for 5-10 minutes before and after sports or exercise.     Other Triggers of Asthma  Cold air:  Cover your nose and mouth with a scarf.  Sometimes laughing or crying can  be a trigger.  Some medicines and food can trigger asthma.

## 2023-10-25 ASSESSMENT — PATIENT HEALTH QUESTIONNAIRE - PHQ9
SUM OF ALL RESPONSES TO PHQ QUESTIONS 1-9: 11
SUM OF ALL RESPONSES TO PHQ QUESTIONS 1-9: 11
10. IF YOU CHECKED OFF ANY PROBLEMS, HOW DIFFICULT HAVE THESE PROBLEMS MADE IT FOR YOU TO DO YOUR WORK, TAKE CARE OF THINGS AT HOME, OR GET ALONG WITH OTHER PEOPLE: VERY DIFFICULT

## 2023-10-25 NOTE — COMMUNITY RESOURCES LIST (ENGLISH)
10/25/2023   [facility]  N/A  For questions about this resource list or additional care needs, please contact your primary care clinic or care manager.  Phone: [phone]   Email: N/A   Address: [address] [place] [address]   Hours: N/A        Transportation       Free or low-cost transportation  1  [employer] Transportation Distance: 11.84 miles      In-Person   [address] [place] [address]  Language: English  Hours: Mon - Fri 6:00 AM - 6:00 PM  Fees: Insurance, Self Pay   Phone: (201) 328-6467 Email: info@[employer] Website: http://www.Crestock.Innovalight     2  [facility] - Bus Passes - Free or low-cost transportation Distance: 14.98 miles      In-Person   [address] [place] [address]  Language: English  Hours: Tue 9:30 AM - 11:30 AM , Thu 9:30 AM - 11:30 AM  Fees: Free   Phone: (164) 644-3523 Email: info@SonicLiving.The Spoken Thought Website: http://www.MediaLAB/     Transportation to medical appointments  3  [facility]  [employer] (AIFW) Distance: 8.15 miles      In-Person   [address] [place] [address]  Language: Urdu, Kiswahili, English, Gujarati, Ileana, Slovenian, English, Czech, Wolof, Ukrainian  Hours: Mon - Wed 9:00 AM - 5:00 PM , Thu 12:00 PM - 6:00 PM , Fri 9:00 AM - 5:00 PM , Sun 10:30 AM - 2:00 PM Appt. Only  Fees: Free   Phone: (423) 570-8533 Email: info@sewa-aifw.org Website: https://www.sewa-aifw.org/     4  [place] Transportation Distance: 8.21 miles      In-Person   [address] [facility] [address] [place] [address]  Language: English  Hours: Mon - Sat 4:00 AM - 6:00 PM  Fees: Insurance, Self Pay   Phone: (875) 864-4690 Email: reiighttransportation1@[employer].Innovalight Website: https://Ingram Medicalonnect.[employer].[facility]./HelpMeConnect/Providers/Delight_Transportation/Transportation/2?returnUrl=%2FHelpMeConnect%2FSearch%2FBasicNeeds%2FTransportation%2FTransportationServices%3Fstart%3D40          Important  Numbers & Websites       Emergency Services   911  [facility] Services   311  Poison Control   (479) 765-3590  Suicide Prevention Lifeline   (650) 551-4966 (TALK)  Child Abuse Hotline   (877) 373-3909 (4-A-Child)  Sexual Assault Hotline   (144) 220-7499 (HOPE)  National Runaway Safeline   (866) 965-5939 (RUNAWAY)  All-Options Talkline   (986) 678-1429  Substance Abuse Referral   (256) 275-1373 (HELP)

## 2023-10-26 ENCOUNTER — VIRTUAL VISIT (OUTPATIENT)
Dept: FAMILY MEDICINE | Facility: CLINIC | Age: 48
End: 2023-10-26
Payer: MEDICARE

## 2023-10-26 DIAGNOSIS — R11.0 NAUSEA: Primary | ICD-10-CM

## 2023-10-26 DIAGNOSIS — F33.3 SEVERE EPISODE OF RECURRENT MAJOR DEPRESSIVE DISORDER, WITH PSYCHOTIC FEATURES (H): ICD-10-CM

## 2023-10-26 PROCEDURE — 99213 OFFICE O/P EST LOW 20 MIN: CPT | Mod: VID | Performed by: PHYSICIAN ASSISTANT

## 2023-10-26 RX ORDER — ONDANSETRON 8 MG/1
8 TABLET, ORALLY DISINTEGRATING ORAL EVERY 8 HOURS PRN
Qty: 20 TABLET | Refills: 0 | Status: SHIPPED | OUTPATIENT
Start: 2023-10-26 | End: 2024-09-16

## 2023-10-26 ASSESSMENT — COLUMBIA-SUICIDE SEVERITY RATING SCALE - C-SSRS
6. HAVE YOU EVER DONE ANYTHING, STARTED TO DO ANYTHING, OR PREPARED TO DO ANYTHING TO END YOUR LIFE?: NO
1. WITHIN THE PAST MONTH, HAVE YOU WISHED YOU WERE DEAD OR WISHED YOU COULD GO TO SLEEP AND NOT WAKE UP?: YES
2. IN THE PAST MONTH, HAVE YOU ACTUALLY HAD ANY THOUGHTS OF KILLING YOURSELF?: NO

## 2023-10-26 ASSESSMENT — ENCOUNTER SYMPTOMS: NAUSEA: 1

## 2023-10-26 NOTE — PATIENT INSTRUCTIONS
"Follow up with gastroenterologist as soon as possible through [facility]   Take zofran three times a day as needed for nausea  Return urgently if any change in symptoms like abdominal pain, fever ,vomiting or other change in symptoms.     The numbers below are emergency phone numbers in the case of a mental health crisis.      [facility]:   24/7 Suicide Hotline - 1-220.158.3709  Non-emergent Mental Health Hotline - 1-932.527.1900  www.lora.org  Mental Health Crisis for [facility]:  Adults, 18 and older  COPE -- [phone]   Children, ages 17 and younger  Child Crisis -- [phone]    [facility]   Appointments [phone]  After Hours Crisis  [phone]    Mobile Crisis Response Team  [facility] Adult [phone]  [facility] Child [phone]  [facility] [phone]    [facility]  [phone]  [facility] [facility] [address]    Crisis Text Line  Text [place] to 328499  Text \"Life to [address] (12 pm - 3 am)    National Suicide Prevention Lifeline  Text 988 for Suicide and Crisis Lifeline   1-688.783.8020  Veterans' service, option 1     "

## 2023-10-26 NOTE — Clinical Note
DILEEP- I did not order labs or imaging- very complicated?? Let me know if you would like him to do anything more to investigate- he planned to follow up with DENI

## 2023-10-26 NOTE — PROGRESS NOTES
"Tito is a 48 year old who is being evaluated via a billable video visit.        Instructions Relayed to Patient by Virtual Roomer:     Patient is active on [employer]:   Relayed following to patient: \"It looks like you are active on [employer], are you able to join the visit this way? If not, do you need us to send you a link now or would you like your provider to send a link via text or email when they are ready to initiate the visit?\"    Reminded patient to ensure they were logged on to virtual visit by arrival time listed. Documented in appointment notes if patient had flexibility to initiate visit sooner than arrival time. If pediatric virtual visit, ensured pediatric patient along with parent/guardian will be present for video visit.     Patient offered the website [email or web address] and/or phone number to [employer] Help line: [phone]   How would you like to obtain your AVS? [employer]  If the video visit is dropped, the invitation should be resent by: Text to cell phone: [phone]  Will anyone else be joining your video visit? No          Assessment & Plan     Nausea  Multiple contributors -he doesn't think related to ozempic.  Has not had vomiting.  Zofran helpful in past.  Refilled med and follow up with [facility] as soon as possible.    - ondansetron (ZOFRAN ODT) 8 MG ODT tab  Dispense: 20 tablet; Refill: 0      Prescription drug management         Depression Screening Follow Up        10/25/2023     6:30 PM   PHQ   PHQ-9 Total Score 11   Q9: Thoughts of better off dead/self-harm past 2 weeks More than half the days   F/U: Thoughts of suicide or self-harm Yes   F/U: Self harm-plan No   F/U: Self-harm action No   F/U: Safety concerns No         10/25/2023     6:30 PM   Last PHQ-9   1.  Little interest or pleasure in doing things 2   2.  Feeling down, depressed, or hopeless 2   3.  Trouble falling or staying asleep, or sleeping too much 0   4.  Feeling tired or having little energy 3   5.  Poor " appetite or overeating 0   6.  Feeling bad about yourself 1   7.  Trouble concentrating 1   8.  Moving slowly or restless 0   Q9: Thoughts of better off dead/self-harm past 2 weeks 2   PHQ-9 Total Score 11   In the past two weeks have you had thoughts of suicide or self harm? Yes   Do you have concerns about your personal safety or the safety of others? No   In the past 2 weeks have you thought about a plan or had intention to harm yourself? No   In the past 2 weeks have you acted on these thoughts in any way? No             10/26/2023    11:50 AM   [facility] ([facility])   Within the last month, have you wished you were dead or wished you could go to sleep and not wake up? Yes   Within the last month, have you had any actual thoughts of killing yourself? No   Within the last month, have you ever done anything, started to do anything, or prepared to do anything to end your life? No         Follow Up        Follow Up Actions Taken  Referred patient back to mental health provider    Discussed the following ways the patient can remain in a safe environment:  be around others  Patient Instructions   Follow up with gastroenterologist as soon as possible through [facility]   Take zofran three times a day as needed for nausea  Return urgently if any change in symptoms like abdominal pain, fever ,vomiting or other change in symptoms.     Faith Camacho PA-C  [facility]    Radha Francis is a 48 year old, presenting for the following health issues:  Nausea        10/26/2023     9:29 AM   Additional Questions   Roomed by Lina       History of Present Illness       Reason for visit:  Nausea  Symptom onset:  More than a month  Symptoms include:  Nausea current medication not effective reglan was discontinued due to side effects  Symptom intensity:  Moderate  Symptom progression:  Worsening  Had these symptoms before:  Yes  Has tried/received treatment for these symptoms:  Yes  Previous treatment  was successful:  No  What makes it worse:  None  What makes it better:  None    He eats 0-1 servings of fruits and vegetables daily.He consumes 2 sweetened beverage(s) daily.He exercises with enough effort to increase his heart rate 9 or less minutes per day.  He exercises with enough effort to increase his heart rate 3 or less days per week.   He is taking medications regularly.         Patient followed by Dr. Lyles presents via video visit requesting zofran.  Reviewed PHQ9 and reports struggling with SI since 2008   Can keep self safe. Sees therapist tab the pain clinic every  2  No plan but what head reverts to managing physical and psychological distress with SI.  History of opioid induced constipation- marinol not helping nausea.  Reglan not helpful. Had some left over zofran - 1 yrs old and helped nausea.   Has not had emesis but nauseated almost to that point.  Will need to follow up with [facility].    On ozempic and doesn't think nausea related to ozempic because not nauseated around the time of meals although has had some issues with gastroparesis in past.   Weight has dropped from 320 to 285 with ozempic   History of opioid induced constipation.  taking movantik to help with that.  Still using Miralax once or twice  a week      Review of Systems   Gastrointestinal:  Positive for nausea.            Objective           Vitals:  No vitals were obtained today due to virtual visit.    Physical Exam   GENERAL: Healthy, alert and no distress  EYES: Eyes grossly normal to inspection.  No discharge or erythema, or obvious scleral/conjunctival abnormalities.  RESP: No audible wheeze, cough, or visible cyanosis.  No visible retractions or increased work of breathing.    SKIN: Visible skin clear. No significant rash, abnormal pigmentation or lesions.  NEURO: Cranial nerves grossly intact.  Mentation and speech appropriate for age.  PSYCH: mentation appears normal, affect flat, judgement and insight intact, and  appearance well groomed                Video-Visit Details    Type of service:  Video Visit   Video Start Time:  1127 AM   Video End Time:11:34 AM    Originating Location (pt. Location): Home    Distant Location (provider location):  Off-site  Platform used for Video Visit: Sophia

## 2023-10-29 DIAGNOSIS — E78.1 HYPERTRIGLYCERIDEMIA: ICD-10-CM

## 2023-10-29 DIAGNOSIS — E78.5 HYPERLIPIDEMIA LDL GOAL <70: ICD-10-CM

## 2023-10-30 RX ORDER — OMEGA-3-ACID ETHYL ESTERS 1 G/1
CAPSULE, LIQUID FILLED ORAL
Qty: 360 CAPSULE | Refills: 2 | Status: SHIPPED | OUTPATIENT
Start: 2023-10-30 | End: 2024-08-12

## 2023-10-31 ENCOUNTER — VIRTUAL VISIT (OUTPATIENT)
Dept: PALLIATIVE MEDICINE | Facility: CLINIC | Age: 48
End: 2023-10-31
Payer: MEDICARE

## 2023-10-31 DIAGNOSIS — G89.29 CHRONIC INTRACTABLE PAIN: Primary | ICD-10-CM

## 2023-10-31 DIAGNOSIS — G62.9 PERIPHERAL POLYNEUROPATHY: ICD-10-CM

## 2023-10-31 DIAGNOSIS — M51.369 DDD (DEGENERATIVE DISC DISEASE), LUMBAR: ICD-10-CM

## 2023-10-31 DIAGNOSIS — M45.8 ANKYLOSING SPONDYLITIS OF SACRAL REGION (H): ICD-10-CM

## 2023-10-31 DIAGNOSIS — G57.12 MERALGIA PARESTHETICA, LEFT LOWER LIMB: ICD-10-CM

## 2023-10-31 PROCEDURE — 96158 HLTH BHV IVNTJ INDIV 1ST 30: CPT | Mod: 95 | Performed by: PSYCHOLOGIST

## 2023-10-31 PROCEDURE — 96159 HLTH BHV IVNTJ INDIV EA ADDL: CPT | Mod: 95 | Performed by: PSYCHOLOGIST

## 2023-10-31 NOTE — PROGRESS NOTES
Joel Pineda is a 48 year old male who is being evaluated via a billable video visit.      Patient is currently in the [facility]? yes    Patient would like the video invitation sent by: Text to cell phone: [phone]}    Video Start Time: 3:00 PM  Video Stop Time: 3:56 PM    Additional provider notes:     Pain Diagnoses per pain provider:   Chronic intractable pain     DDD (degenerative disc disease), lumbar     Ankylosing spondylitis of sacral region (H)     Peripheral polyneuropathy     Meralgia paresthetica, left lower limb             DATA: During today's visit you reported the following: Your back and lower limb pain is a little improved since increase in Lyrica. Your mood is worse since increase in Lyrica - encouraged to let JOCELYN Zavala CNP. Your activity level is unchanged. Your stress level is unchanged. Your sleep is disrupted - waking up about 2-3 am - 'my body just wakes up'. You reported engaging in self-care for your pain 1-2 times daily.    You identified that you would like to focus on the following or had questions regarding the following issues or concerns, and we discussed the following:   - update from neurology appointment yesterday - no definitive answer  - keeping yourself distracted to avoid 'google MD'  - calling [phone] more often, or having urges to - loneliness primarily  - trying to explore ways to increase socialization  - going to concert with friend this weekend - encouraged to talk to him about setting up more times to see each other more regularly  - thinking of checking out game store    ASSESSMENT: Onurs mood seems to be mildly to moderately worse per self-report since increase in lyrica which does seem to be managing his pain a little better. Discussed importance of passing this information on to both JOCELYN Zavala CNP and his psychiatrist to see if medications need adjustment. He also recognizes he is feeling lonely, which is likely playing a role  in his mood symptoms.     PLAN:   Your next appointment is scheduled for 11/14 at 3:00 PM.  Assignment/Objectives /interventions for next session:   - we can look at sleep hygiene next time  - explore ways to engage with friend more regularly    We believe regular attendance is key to your success in our program!    Any time you are unable to keep your appointment we ask that you call us at [phone] at least 24 hours in advance to cancel.This will allow us to offer the appointment time to another patient.   Multiple missed appointments may lead to dismissal from the clinic.    Telemedicine Visit: The patient's condition can be safely assessed and treated via synchronous audio and visual telemedicine encounter.      Reason for Telemedicine Visit: Services only offered telehealth    Originating Site (Patient Location): Patient's home    Distant Site (Provider Location): Provider Remote Setting- Home Office    Consent:  The patient/guardian has verbally consented to: the potential risks and benefits of telemedicine (video visit) versus in person care; bill my insurance or make self-payment for services provided; and responsibility for payment of non-covered services.     Mode of Communication:  Video Conference via [employer]    As the provider I attest to compliance with applicable laws and regulations related to telemedicine.      Shirley Bartlett PsyD LP  Licensed Psychologist  Outpatient Clinic Therapist  [facility]

## 2023-11-03 ENCOUNTER — PRE VISIT (OUTPATIENT)
Dept: SURGERY | Facility: CLINIC | Age: 48
End: 2023-11-03

## 2023-11-06 ENCOUNTER — MYC MEDICAL ADVICE (OUTPATIENT)
Dept: ENDOCRINOLOGY | Facility: CLINIC | Age: 48
End: 2023-11-06
Payer: MEDICARE

## 2023-11-06 NOTE — CONFIDENTIAL NOTE
Patient faxed in [employer] Patient Assistance Program form to be completed.     Writer completed form to best of abilities. Labeled and given to provider to review and sign for approval.   Allergy List also printed to be included.           Will fax back to [employer] to be filled.   Fax:       Radha Estevez CMA  Adult Endocrinology   [facility]

## 2023-11-09 ENCOUNTER — OFFICE VISIT (OUTPATIENT)
Dept: PODIATRY | Facility: CLINIC | Age: 48
End: 2023-11-09
Payer: MEDICARE

## 2023-11-09 VITALS — HEART RATE: 86 BPM | SYSTOLIC BLOOD PRESSURE: 109 MMHG | DIASTOLIC BLOOD PRESSURE: 68 MMHG

## 2023-11-09 DIAGNOSIS — M72.2 PLANTAR FASCIITIS: Primary | ICD-10-CM

## 2023-11-09 DIAGNOSIS — M77.8 CAPSULITIS OF FOOT: ICD-10-CM

## 2023-11-09 PROBLEM — D12.0 BENIGN NEOPLASM OF CECUM: Status: ACTIVE | Noted: 2020-09-17

## 2023-11-09 PROBLEM — D12.2 BENIGN NEOPLASM OF ASCENDING COLON: Status: ACTIVE | Noted: 2020-09-15

## 2023-11-09 PROBLEM — R10.84 GENERALIZED ABDOMINAL PAIN: Status: ACTIVE | Noted: 2023-11-09

## 2023-11-09 PROBLEM — K60.2 ANAL FISSURE: Status: ACTIVE | Noted: 2023-11-09

## 2023-11-09 PROBLEM — R19.8 ALTERED BOWEL FUNCTION: Status: ACTIVE | Noted: 2023-11-09

## 2023-11-09 PROBLEM — R19.8 DIGESTIVE SYMPTOM: Status: ACTIVE | Noted: 2020-01-07

## 2023-11-09 PROBLEM — K61.0 PERIANAL ABSCESS: Status: ACTIVE | Noted: 2023-11-09

## 2023-11-09 PROBLEM — R11.0 NAUSEA: Status: ACTIVE | Noted: 2023-11-09

## 2023-11-09 PROCEDURE — 99213 OFFICE O/P EST LOW 20 MIN: CPT | Performed by: PODIATRIST

## 2023-11-09 RX ORDER — DRONABINOL 5 MG/1
5 CAPSULE ORAL
COMMUNITY
Start: 2023-09-08 | End: 2024-06-10

## 2023-11-09 NOTE — LETTER
11/9/2023         RE: Joel Pineda  [address] [facility] [place] [address]        Dear Colleague,    Thank you for referring your patient, Joel Pineda, to the [facility]. Please see a copy of my visit note below.    Subjective:    9/22/23   Patient is seen today for bilateral heel pain.  He has had this for at least 2 years.  We saw him in 2021 for this.  Also has chronic bilateral subsecond capsulitis.  Has done numerous offloading strategies for this.  Patient has ankylosing spondylitis and sees rheumatology.  He has diabetes with neuropathy.  Recently lost weight on Ozempic.    11/9/23   patient returns for bilateral heel pain bilateral forefoot capsulitis.  States the injection worked very well.  He will is no longer working.  His forefoot pain is much better.  He believes he may have been compensating.  At times just walking around the house with nothing on his feet and no pain.    ROS:  see above       Allergies   Allergen Reactions     Amoxicillin-Pot Clavulanate Difficulty breathing     Banana Shortness Of Breath     Pt reports organic Banana is okay.      Nitroglycerin Palpitations     Penicillins Anaphylaxis     Provigil [Modafinil] Shortness Of Breath     headache     Gadolinium Hives and Itching     Patient was premedicated for the contrast allergy. He did still have a reaction a few hours after injection. Hives and itching. Dr. Gomez told tech to inform pt he should only have contrast again in the future when premedicated and at a hospital. Not at an outpatient facility.      Influenza Virus Vaccine Rash     Quadrivalent, cell based     Ketoconazole      Topical cream caused swelling and itching     Dye [Contrast Dye] Other (See Comments) and Hives     Moderate flushing, CT contrast     Gabapentin      Other reaction(s): hives     Golimumab      Hives, bradycardia, face swelling     Naproxen      Other reaction(s): Bleeding Gums     Neurontin [Gabapentin] Hives  "    Moderate hives     Nortriptyline Hives     Nystatin Hives     Varicella Virus Vaccine Live      Rash     Adhesive Tape Rash     Baclofen Other (See Comments)     Cognitive changes     Flagyl [Metronidazole Hcl] Palpitations and Hives     Latex Rash     Metronidazole Palpitations, Other (See Comments) and Rash     dizziness (versus ciprofloxacin taken at same time)       Current Outpatient Medications   Medication Sig Dispense Refill     droNABinol (MARINOL) 5 MG capsule Take 5 mg by mouth       acetaminophen 500 MG CAPS Take 1,000 mg by mouth 3 times daily 60 capsule      albuterol (PROAIR HFA/PROVENTIL HFA/VENTOLIN HFA) 108 (90 Base) MCG/ACT inhaler INHALE 2 PUFFS BY MOUTH EVERY 4 HOURS AS NEEDED FOR SHORTNESS OF BREATH, DYSPNEA, OR WHEEZING 25.5 g 3     aspirin (ASA) 81 MG tablet Take 81 mg by mouth daily        B-D LUER-LUCILLE SYRINGE 25G X 1\" 3 ML MISC USE WITH B12 INJECTIONS 12 each 0     buPROPion (WELLBUTRIN XL) 300 MG 24 hr tablet Take 1 tablet by mouth daily       cholecalciferol (D3-50) 1250 mcg (09786 units) capsule TAKE ONE CAPSULE BY MOUTH EVERY 2 WEEKS 24 capsule 0     clonazePAM (KLONOPIN) 0.5 MG tablet Take 1 mg by mouth At Bedtime For RBD       cyanocobalamin (CYANOCOBALAMIN) 1000 MCG/ML injection INJECT 1 ML INTO THE MUSCLE EVERY 30 DAYS 10 mL 0     diphenhydrAMINE (BENADRYL) 25 MG capsule Take 25 mg by mouth once a week Before Enbrel injection       droNABinol (MARINOL) 5 MG capsule Take 5 mg by mouth 2 times daily as needed       DULoxetine (CYMBALTA) 60 MG capsule Take 120 mg by mouth daily        EPINEPHrine (ANY BX GENERIC EQUIV) 0.3 MG/0.3ML injection 2-pack Inject 0.3 mLs (0.3 mg) into the muscle once as needed for anaphylaxis 0.6 mL 3     eszopiclone (LUNESTA) 3 MG tablet Take 3 mg by mouth At Bedtime        etanercept (ENBREL SURECLICK) 50 MG/ML autoinjector Inject 50 mg Subcutaneous once a week . Hold for signs of infection, and seek medical attention. 4 mL 7     famotidine (PEPCID) 20 " MG tablet Take 20 mg by mouth daily       fenofibrate (TRICOR) 48 MG tablet TAKE ONE TABLET BY MOUTH ONCE DAILY 90 tablet 0     fexofenadine (ALLEGRA) 180 MG tablet Take 180 mg by mouth daily       fluticasone-salmeterol (ADVAIR-HFA) 45-21 MCG/ACT inhaler Inhale 2 puffs into the lungs 2 times daily       levothyroxine (SYNTHROID/LEVOTHROID) 75 MCG tablet TAKE ONE TABLET BY MOUTH EVERY MORNING 90 tablet 1     lidocaine (XYLOCAINE) 5 % external ointment Apply topically 2 times daily as needed for moderate pain 50 g 3     melatonin 3 MG tablet Take 13 mg by mouth nightly as needed        methocarbamol (ROBAXIN) 500 MG tablet TAKE 1 - 2 TABLETS BY MOUTH 4 TIMES DAILY AS NEEDED FOR MUSCLE SPASMS 240 tablet 1     metoprolol succinate ER (TOPROL XL) 200 MG 24 hr tablet Take 1 tablet (200 mg) by mouth 2 times daily 180 tablet 1     montelukast (SINGULAIR) 10 MG tablet TAKE ONE TABLET BY MOUTH EVERY EVENING 90 tablet 3     nabumetone (RELAFEN) 500 MG tablet Take 1-2 tablets (500-1,000 mg) by mouth 2 times daily as needed for moderate pain 120 tablet 1     naloxegol (MOVANTIK) 12.5 MG TABS tablet Take 12.5 mg by mouth every morning (before breakfast)       naloxone (NARCAN) 4 MG/0.1ML nasal spray Spray 1 spray (4 mg) into one nostril alternating nostrils once as needed for opioid reversal every 2-3 minutes until assistance arrives 0.2 mL 0     olopatadine (PATADAY) 0.2 % ophthalmic solution Place 1 drop into both eyes daily 2.5 mL 3     omega-3 acid ethyl esters (LOVAZA) 1 g capsule TAKE TWO CAPSULES BY MOUTH TWICE DAILY 360 capsule 2     omeprazole (PRILOSEC) 20 MG DR capsule Take 20 mg by mouth as needed       ondansetron (ZOFRAN ODT) 8 MG ODT tab Take 1 tablet (8 mg) by mouth every 8 hours as needed for nausea 20 tablet 0     oxyCODONE (ROXICODONE) 5 MG tablet Take 1 tablet (5 mg) by mouth 3 times daily as needed for pain Ok to fill 10/28/23 to start 10/30/23. 30 day supply for chronic pain. 50 tablet 0     pregabalin  (LYRICA) 150 MG capsule Take 1 capsule (150 mg) by mouth 3 times daily 270 capsule 1     pseudoePHEDrine-guaiFENesin (MUCINEX D)  MG 12 hr tablet Take 1 tablet by mouth every 12 hours       pyridostigmine (MESTINON) 60 MG tablet Take 1 tablet by mouth 3 times daily       ramipril (ALTACE) 10 MG capsule TAKE ONE CAPSULE BY MOUTH ONCE DAILY 90 capsule 1     risperiDONE (RISPERDAL) 0.5 MG tablet Take 0.5 mg by mouth 2 times daily as needed       rizatriptan (MAXALT) 10 MG tablet Take 1 tablet (10 mg) by mouth at onset of headache for migraine May repeat in 2 hours. Max 3 tablets/24 hours. 30 tablet 1     rosuvastatin (CRESTOR) 40 MG tablet Take 1 tablet (40 mg) by mouth daily 90 tablet 1     Semaglutide, 1 MG/DOSE, 4 MG/3ML SOPN Inject 1 mg Subcutaneous once a week 3 mL 11     sodium fluoride 1.1 % CREA At Bedtime       spacer ([employer] JOHNNY) holding chamber To be used in conjunction with albuterol (PROAIR HFA/PROVENTIL HFA/VENTOLIN HFA) 108 (90 Base) MCG/ACT inhaler 1 each 0     valACYclovir (VALTREX) 500 MG tablet Take 1 tablet (500 mg) by mouth daily 90 tablet 3     vitamin B complex with vitamin C (STRESS TAB) tablet Take 1 tablet by mouth daily       ZINC SULFATE-VITAMIN C MT Take 1 tablet by mouth daily         Patient Active Problem List   Diagnosis     Intermittent asthma     Chronic nonallergic rhinitis     Diverticulosis     GERD (gastroesophageal reflux disease)     Generalized anxiety disorder     LLOYD (obstructive sleep apnea)- mild (AHI 11)     Intracranial arachnoid cyst     Facet arthritis of cervical region     Acquired hypothyroidism     Chronic midline low back pain without sciatica     Irritable bowel syndrome with diarrhea     B12 deficiency     Essential hypertension with goal blood pressure less than 140/90     Chronic, continuous use of opioids     Ankylosing spondylitis of sacral region (H)     Morbid obesity (H)     Fatty infiltration of liver     DDD (degenerative disc disease),  lumbar     Type 2 diabetes mellitus with complication, without long-term current use of insulin (H)     Peripheral polyneuropathy     History of pulmonary embolism     Ingrown toenail     Hypertriglyceridemia     Gastroparesis     Orthostatic dizziness     POTS (postural orthostatic tachycardia syndrome)     PHN (postherpetic neuralgia)     Dysautonomia (H)     Chronic pain syndrome     Borderline personality disorder (H)     MDD (major depressive disorder), recurrent severe, without psychosis (H)     Folliculitis     Immunosuppression (H24)     Small fiber neuropathy     RBD (REM behavioral disorder)     Hyperlipidemia LDL goal <70     Anal fissure     Anorectal disorder     Benign neoplasm of ascending colon     Benign neoplasm of cecum     Altered bowel function     Digestive symptom     Dysphagia     Generalized abdominal pain     History of colitis     Internal hemorrhoids     Nausea     Perianal abscess     Hemorrhage of rectum and anus       Past Medical History:   Diagnosis Date     Acne      Acquired hypothyroidism      Allergic state      Ankylosing spondylitis lumbar region (H)      Ankylosing spondylitis of sacral region (H)      Anxiety      Bipolar 2 disorder (H)      Chest pain     Chest pain, regulated w/BP meds. Clear arteries.     Chronic pain      DDD (degenerative disc disease), lumbar      Depressive disorder      Diabetes (H)      Diverticulosis      Facet arthritis of cervical region      Gastroesophageal reflux disease      Hypertension      IBS (irritable bowel syndrome)      Intracranial arachnoid cyst      Major depressive disorder, recurrent episode (H24)     Multiple psych providers - they manage meds August 2015: Provigil induced severe mood dis-function      Major depressive disorder, recurrent episode, severe (H) 12/2/2020     MDD (major depressive disorder), recurrent severe, without psychosis (H) 7/21/2021     Mixed dyslipidemia 4/6/2023     LLOYD (obstructive sleep apnea)- mild (AHI  11)      Polyneuropathy      Pulmonary embolism (H)      Skin exam, screening for cancer 12/3/2013     Sleep apnea      Uncomplicated asthma        Past Surgical History:   Procedure Laterality Date     BACK SURGERY  10/2007    lumbar discectomy L5-S1     BIOPSY  09/15/2020    Colon Adenomas x2     COLONOSCOPY      Note: colonoscopy scheduled with [facility] on Friday, 9/4/15     COSMETIC SURGERY  2012    Nose Exterior - functional     GI SURGERY  08/2013    Sigmoidectomy     HERNIA REPAIR, UMBILICAL  08/23/2011    small, Dr. Evan Beavers     INCISION AND DRAINAGE, ABSCESS, COMPLEX  08/23/2011    umbilical, Dr. Evan Beavers     LAPAROSCOPIC ASSISTED COLECTOMY LEFT (DESCENDING)  08/15/2013    Procedure: LAPAROSCOPIC ASSISTED COLECTOMY LEFT (DESCENDING);  Laparoscopic Hand Assisted Sigmoid Resection, Mobilization of Splenic Fissure, coloproctoscopy, *Latex Free Room* Anesthesia General with Pain block  ;  Surgeon: Aurora Justice MD;  Location: [facility] OR     NERVE SURGERY  08/18/2011    RF ablation @ L3-S1 @ [facility]     RECONSTRUCT NOSE AND SEPTUM (FUNCTIONAL)  10/14/2011    Procedure:RECONSTRUCT NOSE AND SEPTUM (FUNCTIONAL); Functional Septorhinoplasty, Turbinate Reduction, ; Surgeon:CEDRIC CUEVAS; Location:[facility] OR     SINUS SURGERY  10/01/2001    ethmoidectomy chronic sinusitis     SOFT TISSUE SURGERY  4/7/2022    Hidradenitis Suppurativa surgery       Family History   Problem Relation Age of Onset     Musculoskeletal Disorder Mother         back     Anxiety Disorder Mother      Colon Polyps Mother      Ulcerative Colitis Mother         and ischemic small intestine, surgery     Hypertension Mother      Breast Cancer Mother      Osteoporosis Mother      Diabetes Mother         Type 2, Diagnosed in 2014     Depression Mother         Takes Cymbalta to help with chronic pain + depx     Thyroid Disease Mother         Hypothyroidism     Obesity Mother         Under much better control latter half of 2015     Musculoskeletal  "Disorder Father         back     Substance Abuse Father         Alcohol     Hypertension Father      Hyperlipidemia Father      Depression Father         Off meds for many years. Seems \"ok\"     Anxiety Disorder Father      Heart Disease Maternal Grandmother      Heart Disease Maternal Grandfather      Psychotic Disorder Paternal Grandfather      Suicide Paternal Grandfather      Depression Paternal Grandfather         Pediatrician. Committed suicide by pistol in 1990.     Musculoskeletal Disorder Brother         back     Depression Brother         Expressed as anger and moodiness     Substance Abuse Brother         Alcohol     Anxiety Disorder Brother      Substance Abuse Sister         Alcohol     Depression Sister         Mental Health Therapist, yet no anti-depressants?     Anxiety Disorder Sister         Mental Health Therapist, yet no anti-anxiety meds?     Other Cancer Other         Bladder     Substance Abuse Brother      Colon Cancer No family hx of      Crohn's Disease No family hx of      Anesthesia Reaction No family hx of      Cancer No family hx of         No family history of skin cancer       Social History     Tobacco Use     Smoking status: Never     Smokeless tobacco: Never   Substance Use Topics     Alcohol use: Not Currently     Comment: occ 1/week         Objective:    Vitals: /68   Pulse 86   BMI: There is no height or weight on file to calculate BMI.  Height: Data Unavailable    Constitutional/ general:  Pt is in no apparent distress, appears well-nourished.  Cooperative with history and physical exam.     Psych:  The patient answered questions appropriately.  Normal affect.  Seems to have reasonable expectations, in terms of treatment.     Lungs:  Non labored breathing, non labored speech. No cough.  No audible wheezing. Even, quiet breathing.       Vascular:  Pedal pulses are palpable bilaterally for both the DP and PT arteries.  CFT < 3 sec.  No edema.  Pedal hair growth noted. "     Neuro:  Alert and oriented x 3. Coordinated gait.  Light touch sensation is intact     Derm: Normal texture and turgor.  No erythema, ecchymosis, or cyanosis.  No open lesions.     Musculoskeletal:    Lower extremity muscle strength is normal.  Patient is ambulatory without an assistive device or brace.  No gross deformities.      Normal arch with weightbearing.   ROM is within normal limits.  Negative tinel's sign.  No side to side compression pain of the calcaneus.  Today no pain upon palpation to the bilateral plantarmedial  of the calcaneus.  No erythema, edema, ecchymosis, or subcutaneous masses noted.  No pain on palpation or stressing any tendons.  Negative Tinel's sign.  Today no pain plantar second metatarsal heads bilaterally    Radiographic Exam:  X-Ray Findings:  I personally reviewed the films.  Normal  MRI left foot 2021 results noted.      Component Ref Range & Units 3 mo ago     Hemoglobin A1C POCT 4.3 - <5.7 % 5.5 Negative (Neg)       Assessment:    Ankylosing spondylitis   Diabetes mellitus   Plantar Fasciitis right and left foot   Bilateral subsecond capsulitis    Plan: Patient is significantly better.  We will continue to give more time to heal.  Recommend continuing to wear good shoe at all times to prevent further problems.  RTC as needed.      Peng Perez DPM, FACFAS        Again, thank you for allowing me to participate in the care of your patient.        Sincerely,        Peng Perez DPM

## 2023-11-09 NOTE — PROGRESS NOTES
Subjective:    9/22/23   Patient is seen today for bilateral heel pain.  He has had this for at least 2 years.  We saw him in 2021 for this.  Also has chronic bilateral subsecond capsulitis.  Has done numerous offloading strategies for this.  Patient has ankylosing spondylitis and sees rheumatology.  He has diabetes with neuropathy.  Recently lost weight on Ozempic.    11/9/23   patient returns for bilateral heel pain bilateral forefoot capsulitis.  States the injection worked very well.  He will is no longer working.  His forefoot pain is much better.  He believes he may have been compensating.  At times just walking around the house with nothing on his feet and no pain.    ROS:  see above       Allergies   Allergen Reactions    Amoxicillin-Pot Clavulanate Difficulty breathing    Banana Shortness Of Breath     Pt reports organic Banana is okay.     Nitroglycerin Palpitations    Penicillins Anaphylaxis    Provigil [Modafinil] Shortness Of Breath     headache    Gadolinium Hives and Itching     Patient was premedicated for the contrast allergy. He did still have a reaction a few hours after injection. Hives and itching. Dr. Gomez told tech to inform pt he should only have contrast again in the future when premedicated and at a hospital. Not at an outpatient facility.     Influenza Virus Vaccine Rash     Quadrivalent, cell based    Ketoconazole      Topical cream caused swelling and itching    Dye [Contrast Dye] Other (See Comments) and Hives     Moderate flushing, CT contrast    Gabapentin      Other reaction(s): hives    Golimumab      Hives, bradycardia, face swelling    Naproxen      Other reaction(s): Bleeding Gums    Neurontin [Gabapentin] Hives     Moderate hives    Nortriptyline Hives    Nystatin Hives    Varicella Virus Vaccine Live      Rash    Adhesive Tape Rash    Baclofen Other (See Comments)     Cognitive changes    Flagyl [Metronidazole Hcl] Palpitations and Hives    Latex Rash    Metronidazole  "Palpitations, Other (See Comments) and Rash     dizziness (versus ciprofloxacin taken at same time)       Current Outpatient Medications   Medication Sig Dispense Refill    droNABinol (MARINOL) 5 MG capsule Take 5 mg by mouth      acetaminophen 500 MG CAPS Take 1,000 mg by mouth 3 times daily 60 capsule     albuterol (PROAIR HFA/PROVENTIL HFA/VENTOLIN HFA) 108 (90 Base) MCG/ACT inhaler INHALE 2 PUFFS BY MOUTH EVERY 4 HOURS AS NEEDED FOR SHORTNESS OF BREATH, DYSPNEA, OR WHEEZING 25.5 g 3    aspirin (ASA) 81 MG tablet Take 81 mg by mouth daily       B-D LUER-LUCILLE SYRINGE 25G X 1\" 3 ML MISC USE WITH B12 INJECTIONS 12 each 0    buPROPion (WELLBUTRIN XL) 300 MG 24 hr tablet Take 1 tablet by mouth daily      cholecalciferol (D3-50) 1250 mcg (23563 units) capsule TAKE ONE CAPSULE BY MOUTH EVERY 2 WEEKS 24 capsule 0    clonazePAM (KLONOPIN) 0.5 MG tablet Take 1 mg by mouth At Bedtime For RBD      cyanocobalamin (CYANOCOBALAMIN) 1000 MCG/ML injection INJECT 1 ML INTO THE MUSCLE EVERY 30 DAYS 10 mL 0    diphenhydrAMINE (BENADRYL) 25 MG capsule Take 25 mg by mouth once a week Before Enbrel injection      droNABinol (MARINOL) 5 MG capsule Take 5 mg by mouth 2 times daily as needed      DULoxetine (CYMBALTA) 60 MG capsule Take 120 mg by mouth daily       EPINEPHrine (ANY BX GENERIC EQUIV) 0.3 MG/0.3ML injection 2-pack Inject 0.3 mLs (0.3 mg) into the muscle once as needed for anaphylaxis 0.6 mL 3    eszopiclone (LUNESTA) 3 MG tablet Take 3 mg by mouth At Bedtime       etanercept (ENBREL SURECLICK) 50 MG/ML autoinjector Inject 50 mg Subcutaneous once a week . Hold for signs of infection, and seek medical attention. 4 mL 7    famotidine (PEPCID) 20 MG tablet Take 20 mg by mouth daily      fenofibrate (TRICOR) 48 MG tablet TAKE ONE TABLET BY MOUTH ONCE DAILY 90 tablet 0    fexofenadine (ALLEGRA) 180 MG tablet Take 180 mg by mouth daily      fluticasone-salmeterol (ADVAIR-HFA) 45-21 MCG/ACT inhaler Inhale 2 puffs into the lungs 2 " times daily      levothyroxine (SYNTHROID/LEVOTHROID) 75 MCG tablet TAKE ONE TABLET BY MOUTH EVERY MORNING 90 tablet 1    lidocaine (XYLOCAINE) 5 % external ointment Apply topically 2 times daily as needed for moderate pain 50 g 3    melatonin 3 MG tablet Take 13 mg by mouth nightly as needed       methocarbamol (ROBAXIN) 500 MG tablet TAKE 1 - 2 TABLETS BY MOUTH 4 TIMES DAILY AS NEEDED FOR MUSCLE SPASMS 240 tablet 1    metoprolol succinate ER (TOPROL XL) 200 MG 24 hr tablet Take 1 tablet (200 mg) by mouth 2 times daily 180 tablet 1    montelukast (SINGULAIR) 10 MG tablet TAKE ONE TABLET BY MOUTH EVERY EVENING 90 tablet 3    nabumetone (RELAFEN) 500 MG tablet Take 1-2 tablets (500-1,000 mg) by mouth 2 times daily as needed for moderate pain 120 tablet 1    naloxegol (MOVANTIK) 12.5 MG TABS tablet Take 12.5 mg by mouth every morning (before breakfast)      naloxone (NARCAN) 4 MG/0.1ML nasal spray Spray 1 spray (4 mg) into one nostril alternating nostrils once as needed for opioid reversal every 2-3 minutes until assistance arrives 0.2 mL 0    olopatadine (PATADAY) 0.2 % ophthalmic solution Place 1 drop into both eyes daily 2.5 mL 3    omega-3 acid ethyl esters (LOVAZA) 1 g capsule TAKE TWO CAPSULES BY MOUTH TWICE DAILY 360 capsule 2    omeprazole (PRILOSEC) 20 MG DR capsule Take 20 mg by mouth as needed      ondansetron (ZOFRAN ODT) 8 MG ODT tab Take 1 tablet (8 mg) by mouth every 8 hours as needed for nausea 20 tablet 0    oxyCODONE (ROXICODONE) 5 MG tablet Take 1 tablet (5 mg) by mouth 3 times daily as needed for pain Ok to fill 10/28/23 to start 10/30/23. 30 day supply for chronic pain. 50 tablet 0    pregabalin (LYRICA) 150 MG capsule Take 1 capsule (150 mg) by mouth 3 times daily 270 capsule 1    pseudoePHEDrine-guaiFENesin (MUCINEX D)  MG 12 hr tablet Take 1 tablet by mouth every 12 hours      pyridostigmine (MESTINON) 60 MG tablet Take 1 tablet by mouth 3 times daily      ramipril (ALTACE) 10 MG capsule  TAKE ONE CAPSULE BY MOUTH ONCE DAILY 90 capsule 1    risperiDONE (RISPERDAL) 0.5 MG tablet Take 0.5 mg by mouth 2 times daily as needed      rizatriptan (MAXALT) 10 MG tablet Take 1 tablet (10 mg) by mouth at onset of headache for migraine May repeat in 2 hours. Max 3 tablets/24 hours. 30 tablet 1    rosuvastatin (CRESTOR) 40 MG tablet Take 1 tablet (40 mg) by mouth daily 90 tablet 1    Semaglutide, 1 MG/DOSE, 4 MG/3ML SOPN Inject 1 mg Subcutaneous once a week 3 mL 11    sodium fluoride 1.1 % CREA At Bedtime      spacer (OPTICHAMBER JOHNNY) holding chamber To be used in conjunction with albuterol (PROAIR HFA/PROVENTIL HFA/VENTOLIN HFA) 108 (90 Base) MCG/ACT inhaler 1 each 0    valACYclovir (VALTREX) 500 MG tablet Take 1 tablet (500 mg) by mouth daily 90 tablet 3    vitamin B complex with vitamin C (STRESS TAB) tablet Take 1 tablet by mouth daily      ZINC SULFATE-VITAMIN C MT Take 1 tablet by mouth daily         Patient Active Problem List   Diagnosis    Intermittent asthma    Chronic nonallergic rhinitis    Diverticulosis    GERD (gastroesophageal reflux disease)    Generalized anxiety disorder    LLOYD (obstructive sleep apnea)- mild (AHI 11)    Intracranial arachnoid cyst    Facet arthritis of cervical region    Acquired hypothyroidism    Chronic midline low back pain without sciatica    Irritable bowel syndrome with diarrhea    B12 deficiency    Essential hypertension with goal blood pressure less than 140/90    Chronic, continuous use of opioids    Ankylosing spondylitis of sacral region (H)    Morbid obesity (H)    Fatty infiltration of liver    DDD (degenerative disc disease), lumbar    Type 2 diabetes mellitus with complication, without long-term current use of insulin (H)    Peripheral polyneuropathy    History of pulmonary embolism    Ingrown toenail    Hypertriglyceridemia    Gastroparesis    Orthostatic dizziness    POTS (postural orthostatic tachycardia syndrome)    PHN (postherpetic neuralgia)     Dysautonomia (H)    Chronic pain syndrome    Borderline personality disorder (H)    MDD (major depressive disorder), recurrent severe, without psychosis (H)    Folliculitis    Immunosuppression (H24)    Small fiber neuropathy    RBD (REM behavioral disorder)    Hyperlipidemia LDL goal <70    Anal fissure    Anorectal disorder    Benign neoplasm of ascending colon    Benign neoplasm of cecum    Altered bowel function    Digestive symptom    Dysphagia    Generalized abdominal pain    History of colitis    Internal hemorrhoids    Nausea    Perianal abscess    Hemorrhage of rectum and anus       Past Medical History:   Diagnosis Date    Acne     Acquired hypothyroidism     Allergic state     Ankylosing spondylitis lumbar region (H)     Ankylosing spondylitis of sacral region (H)     Anxiety     Bipolar 2 disorder (H)     Chest pain     Chest pain, regulated w/BP meds. Clear arteries.    Chronic pain     DDD (degenerative disc disease), lumbar     Depressive disorder     Diabetes (H)     Diverticulosis     Facet arthritis of cervical region     Gastroesophageal reflux disease     Hypertension     IBS (irritable bowel syndrome)     Intracranial arachnoid cyst     Major depressive disorder, recurrent episode (H24)     Multiple psych providers - they manage meds August 2015: Provigil induced severe mood dis-function     Major depressive disorder, recurrent episode, severe (H) 12/2/2020    MDD (major depressive disorder), recurrent severe, without psychosis (H) 7/21/2021    Mixed dyslipidemia 4/6/2023    LLOYD (obstructive sleep apnea)- mild (AHI 11)     Polyneuropathy     Pulmonary embolism (H)     Skin exam, screening for cancer 12/3/2013    Sleep apnea     Uncomplicated asthma        Past Surgical History:   Procedure Laterality Date    BACK SURGERY  10/2007    lumbar discectomy L5-S1    BIOPSY  09/15/2020    Colon Adenomas x2    COLONOSCOPY      Note: colonoscopy scheduled with [facility] on Friday, 9/4/15    COSMETIC SURGERY   "2012    Nose Exterior - functional    GI SURGERY  08/2013    Sigmoidectomy    HERNIA REPAIR, UMBILICAL  08/23/2011    small, Dr. Evan Beavers    INCISION AND DRAINAGE, ABSCESS, COMPLEX  08/23/2011    umbilical, Dr. Evan Beavers    LAPAROSCOPIC ASSISTED COLECTOMY LEFT (DESCENDING)  08/15/2013    Procedure: LAPAROSCOPIC ASSISTED COLECTOMY LEFT (DESCENDING);  Laparoscopic Hand Assisted Sigmoid Resection, Mobilization of Splenic Fissure, coloproctoscopy, *Latex Free Room* Anesthesia General with Pain block  ;  Surgeon: Aurora Justice MD;  Location: [facility] OR    NERVE SURGERY  08/18/2011    RF ablation @ L3-S1 @ [facility]    RECONSTRUCT NOSE AND SEPTUM (FUNCTIONAL)  10/14/2011    Procedure:RECONSTRUCT NOSE AND SEPTUM (FUNCTIONAL); Functional Septorhinoplasty, Turbinate Reduction, ; Surgeon:CEDRIC CUEVAS; Location:[facility] OR    SINUS SURGERY  10/01/2001    ethmoidectomy chronic sinusitis    SOFT TISSUE SURGERY  4/7/2022    Hidradenitis Suppurativa surgery       Family History   Problem Relation Age of Onset    Musculoskeletal Disorder Mother         back    Anxiety Disorder Mother     Colon Polyps Mother     Ulcerative Colitis Mother         and ischemic small intestine, surgery    Hypertension Mother     Breast Cancer Mother     Osteoporosis Mother     Diabetes Mother         Type 2, Diagnosed in 2014    Depression Mother         Takes Cymbalta to help with chronic pain + depx    Thyroid Disease Mother         Hypothyroidism    Obesity Mother         Under much better control latter half of 2015    Musculoskeletal Disorder Father         back    Substance Abuse Father         Alcohol    Hypertension Father     Hyperlipidemia Father     Depression Father         Off meds for many years. Seems \"ok\"    Anxiety Disorder Father     Heart Disease Maternal Grandmother     Heart Disease Maternal Grandfather     Psychotic Disorder Paternal Grandfather     Suicide Paternal Grandfather     Depression Paternal Grandfather         " Pediatrician. Committed suicide by pistol in 1990.    Musculoskeletal Disorder Brother         back    Depression Brother         Expressed as anger and moodiness    Substance Abuse Brother         Alcohol    Anxiety Disorder Brother     Substance Abuse Sister         Alcohol    Depression Sister         Mental Health Therapist, yet no anti-depressants?    Anxiety Disorder Sister         Mental Health Therapist, yet no anti-anxiety meds?    Other Cancer Other         Bladder    Substance Abuse Brother     Colon Cancer No family hx of     Crohn's Disease No family hx of     Anesthesia Reaction No family hx of     Cancer No family hx of         No family history of skin cancer       Social History     Tobacco Use    Smoking status: Never    Smokeless tobacco: Never   Substance Use Topics    Alcohol use: Not Currently     Comment: occ 1/week         Objective:    Vitals: /68   Pulse 86   BMI: There is no height or weight on file to calculate BMI.  Height: Data Unavailable    Constitutional/ general:  Pt is in no apparent distress, appears well-nourished.  Cooperative with history and physical exam.     Psych:  The patient answered questions appropriately.  Normal affect.  Seems to have reasonable expectations, in terms of treatment.     Lungs:  Non labored breathing, non labored speech. No cough.  No audible wheezing. Even, quiet breathing.       Vascular:  Pedal pulses are palpable bilaterally for both the DP and PT arteries.  CFT < 3 sec.  No edema.  Pedal hair growth noted.     Neuro:  Alert and oriented x 3. Coordinated gait.  Light touch sensation is intact     Derm: Normal texture and turgor.  No erythema, ecchymosis, or cyanosis.  No open lesions.     Musculoskeletal:    Lower extremity muscle strength is normal.  Patient is ambulatory without an assistive device or brace.  No gross deformities.      Normal arch with weightbearing.   ROM is within normal limits.  Negative tinel's sign.  No side to side  compression pain of the calcaneus.  Today no pain upon palpation to the bilateral plantarmedial  of the calcaneus.  No erythema, edema, ecchymosis, or subcutaneous masses noted.  No pain on palpation or stressing any tendons.  Negative Tinel's sign.  Today no pain plantar second metatarsal heads bilaterally    Radiographic Exam:  X-Ray Findings:  I personally reviewed the films.  Normal  MRI left foot 2021 results noted.      Component Ref Range & Units 3 mo ago     Hemoglobin A1C POCT 4.3 - <5.7 % 5.5 Negative (Neg)       Assessment:    Ankylosing spondylitis   Diabetes mellitus   Plantar Fasciitis right and left foot   Bilateral subsecond capsulitis    Plan: Patient is significantly better.  We will continue to give more time to heal.  Recommend continuing to wear good shoe at all times to prevent further problems.  RTC as needed.      Peng Perez DPM, FACFAS

## 2023-11-14 ENCOUNTER — PATIENT OUTREACH (OUTPATIENT)
Dept: CARE COORDINATION | Facility: CLINIC | Age: 48
End: 2023-11-14

## 2023-11-14 ENCOUNTER — VIRTUAL VISIT (OUTPATIENT)
Dept: PALLIATIVE MEDICINE | Facility: CLINIC | Age: 48
End: 2023-11-14
Payer: MEDICARE

## 2023-11-14 DIAGNOSIS — G62.9 PERIPHERAL POLYNEUROPATHY: ICD-10-CM

## 2023-11-14 DIAGNOSIS — G89.29 CHRONIC INTRACTABLE PAIN: Primary | ICD-10-CM

## 2023-11-14 DIAGNOSIS — G57.12 MERALGIA PARESTHETICA, LEFT LOWER LIMB: ICD-10-CM

## 2023-11-14 DIAGNOSIS — M45.8 ANKYLOSING SPONDYLITIS OF SACRAL REGION (H): ICD-10-CM

## 2023-11-14 DIAGNOSIS — M51.369 DDD (DEGENERATIVE DISC DISEASE), LUMBAR: ICD-10-CM

## 2023-11-14 PROCEDURE — 96158 HLTH BHV IVNTJ INDIV 1ST 30: CPT | Mod: 95 | Performed by: PSYCHOLOGIST

## 2023-11-14 PROCEDURE — 96159 HLTH BHV IVNTJ INDIV EA ADDL: CPT | Mod: 95 | Performed by: PSYCHOLOGIST

## 2023-11-14 NOTE — PROGRESS NOTES
Joel Pineda is a 48 year old male who is being evaluated via a billable video visit.      Patient is currently in the [facility]? yes    Patient would like the video invitation sent by: Text to cell phone: [phone]}    Video Start Time: 2:59 PM  Video Stop Time: 3:54 PM    Additional provider notes:     Pain Diagnoses per pain provider:   Chronic intractable pain    DDD (degenerative disc disease), lumbar    Ankylosing spondylitis of sacral region (H)    Peripheral polyneuropathy    Meralgia paresthetica, left lower limb        DATA: During today's visit you reported the following: Your back pain is worse - administered methyl-naltrexone in ED to stimulate bowels, drove to [place] to see mother and step-father and walked. Your mood is feeling more stable. Your activity level is about the same - 20 min walk in [place] triggered pain as it followed 40 min drive. Your stress level is overall manageable - known stressors. Worry about visiting mother and step-father as you don't feel he treats her very well. Your sleep is unchanged. You reported engaging in self-care for your pain 2-3 times daily.    You identified that you would like to focus on the following or had questions regarding the following issues or concerns, and we discussed the following:   - ED visit since last appointment - constipation related pain  - continuing to look for outlets for smiley, talking to friend about more regular get-together   - visit with mother and step-father was nice  - hoping to take advantage of early Black Friday to purchase new computer  - planning to spend Thanksgiving with mother and step-father  - thinking of going to [place] in February to visit father, brother and his family will be there as well  - wonder if back brace might be helpful for travel - both air and car travel  - looking forward to [facility] in two weeks  - on-going stress related to [employer]    ASSESSMENT: Tito's pain is mildly elevated  however due to known triggers of a longer drive to/from visit with mother and then walking. He reports his mood has stabilized - we discussed it is normal to have variability in mood, and signs to watch out for that his mood is worsening for more days than not.    PLAN:   Your next appointment is scheduled for 11/28 at 4:00 PM.  Assignment/Objectives /interventions for next session:   - use skills to cope ahead for family visit next week  - continue to engage in self-care as often as needed    We believe regular attendance is key to your success in our program!    Any time you are unable to keep your appointment we ask that you call us at [phone] at least 24 hours in advance to cancel.This will allow us to offer the appointment time to another patient.   Multiple missed appointments may lead to dismissal from the clinic.    Telemedicine Visit: The patient's condition can be safely assessed and treated via synchronous audio and visual telemedicine encounter.      Reason for Telemedicine Visit: Services only offered telehealth    Originating Site (Patient Location): Patient's home    Distant Site (Provider Location): Provider Remote Setting- Home Office    Consent:  The patient/guardian has verbally consented to: the potential risks and benefits of telemedicine (video visit) versus in person care; bill my insurance or make self-payment for services provided; and responsibility for payment of non-covered services.     Mode of Communication:  Video Conference via [employer]    As the provider I attest to compliance with applicable laws and regulations related to telemedicine.      Shirley Bartlett PsyD LP  Licensed Psychologist  Outpatient Clinic Therapist  [facility]

## 2023-11-15 DIAGNOSIS — J45.30 MILD PERSISTENT ASTHMA, UNSPECIFIED WHETHER COMPLICATED: Primary | ICD-10-CM

## 2023-11-15 RX ORDER — FLUTICASONE PROPIONATE AND SALMETEROL XINAFOATE 45; 21 UG/1; UG/1
2 AEROSOL, METERED RESPIRATORY (INHALATION) 2 TIMES DAILY
Qty: 36 G | Refills: 3 | Status: SHIPPED | OUTPATIENT
Start: 2023-11-15 | End: 2024-01-19

## 2023-11-15 NOTE — COMMUNITY RESOURCES LIST (ENGLISH)
11/15/2023   [facility]  [facility]/A  For questions about this resource list or additional care needs, please contact your primary care clinic or care manager.  Phone: [phone]   Email: N/A   Address: [address] [place] [address]   Hours: N/A        Transportation       Free or low-cost transportation  1  [employer] Distance: 11.84 miles      In-Person   [address] [place] [address]  Language: English  Hours: Mon - Fri 6:00 AM - 6:00 PM  Fees: Insurance, Self Pay   Phone: (769) 855-6709 Email: info@[employer] Website: http://www.[employer].DataVote     2  [facility] - [employer] - Free or low-cost transportation Distance: 14.98 miles      In-Person   [address] [place] [address]  Language: English  Hours: Tue 9:30 AM - 11:30 AM , Thu 9:30 AM - 11:30 AM  Fees: Free   Phone: (565) 542-6260 Email: info@[employer] Website: http://www.noodls/     Transportation to medical appointments  3  [facility]  [facility] Distance: 8.15 miles      In-Person   [address] [place] [address]  Language: Indonesian, Portuguese, English, Gujarati, Ileana, Greek, Maltese, Mongolian, Luxembourgish, Turkmen  Hours: Mon - Wed 9:00 AM - 5:00 PM , Thu 12:00 PM - 6:00 PM , Fri 9:00 AM - 5:00 PM , Sun 10:30 AM - 2:00 PM Appt. Only  Fees: Free   Phone: (479) 286-2510 Email: info@[employer].org Website: https://www.sewa-aifw.org/     4  [employer] Distance: 8.21 miles      In-Person   [address] [facility] [address] [place] [address]  Language: English  Hours: Mon - Sat 4:00 AM - 6:00 PM  Fees: Insurance, Self Pay   Phone: (896) 884-5142 Email: reiighttransportation1@[employer] Website: https://[employer].[facility]./HelpMeConnect/Providers/Delight_Transportation/Transportation/2?returnUrl=%2FHelpMeConnect%2FSearch%2FBasicNeeds%2FTransportation%2FTransportationServices%3Fstart%3D40          Important  Numbers & Websites       Emergency Services   911  [facility] Services   311  Poison Control   (767) 318-8307  Suicide Prevention Lifeline   (796) 612-4843 (TALK)  Child Abuse Hotline   (331) 776-8986 (4-A-Child)  Sexual Assault Hotline   (225) 338-1595 (HOPE)  National Runaway Safeline   (828) 598-7165 (RUNAWAY)  All-Options Talkline   (147) 129-4777  Substance Abuse Referral   (210) 778-3902 (HELP)

## 2023-11-16 ENCOUNTER — VIRTUAL VISIT (OUTPATIENT)
Dept: FAMILY MEDICINE | Facility: CLINIC | Age: 48
End: 2023-11-16
Payer: MEDICARE

## 2023-11-16 DIAGNOSIS — I10 ESSENTIAL HYPERTENSION WITH GOAL BLOOD PRESSURE LESS THAN 140/90: Primary | ICD-10-CM

## 2023-11-16 DIAGNOSIS — T40.2X5A THERAPEUTIC OPIOID INDUCED CONSTIPATION: ICD-10-CM

## 2023-11-16 DIAGNOSIS — K59.03 THERAPEUTIC OPIOID INDUCED CONSTIPATION: ICD-10-CM

## 2023-11-16 DIAGNOSIS — G89.4 CHRONIC PAIN SYNDROME: ICD-10-CM

## 2023-11-16 PROCEDURE — 99214 OFFICE O/P EST MOD 30 MIN: CPT | Mod: VID | Performed by: FAMILY MEDICINE

## 2023-11-16 RX ORDER — METOPROLOL SUCCINATE 100 MG/1
TABLET, EXTENDED RELEASE ORAL
Qty: 90 TABLET | Refills: 0 | Status: SHIPPED | OUTPATIENT
Start: 2023-11-16 | End: 2024-01-22

## 2023-11-16 NOTE — GROUP NOTE
Psychotherapy Group Note    PATIENT'S NAME: Joel Pineda  MRN:   [id number]  :   1975  ACCT. NUMBER: [id number]  DATE OF SERVICE: 10/18/21  START TIME:  3:00 PM  END TIME:  3:50 PM  FACILITATOR: Lashell Leonard  TOPIC: MH EBP Group: Cognitive Restructuring  [facility]  TRACK: 4B                                      Service Modality:  Video Visit     Telemedicine Visit: The patient's condition can be safely assessed and treated via synchronous audio and visual telemedicine encounter.      Reason for Telemedicine Visit:  covid 19     Originating Site (Patient Location): Patient's home    Distant Site (Provider Location): Provider Remote Setting- Home Office    Consent:  The patient/guardian has verbally consented to: the potential risks and benefits of telemedicine (video visit) versus in person care; bill my insurance or make self-payment for services provided; and responsibility for payment of non-covered services.     Patient would like the video invitation sent by:  My Chart    Mode of Communication:  Video Conference via [employer]    As the provider I attest to compliance with applicable laws and regulations related to telemedicine.          NUMBER OF PARTICIPANTS: 7    Summary of Group / Topics Discussed:  Cognitive Restructuring: Distortions: Patients received an overview of how to identify common cognitive distortions. Patients will explore alternatives to cognitive distortions and practice challenging their negative thought patterns. The goal is to help patients target modify ineffective thought patterns.     Patient Session Goals / Objectives:    Familiarized self with ineffective / unhealthy thoughts and how they develop.      Explored impact of ineffective thoughts / distortions on mood and activity    Formulated new neutral/positive alternatives to challenge less helpful / ineffective thoughts.    Practiced and plan to apply in daily  life               Patient Participation / Response:  Fully participated with the group by sharing personal reflections / insights and openly received / provided feedback with other participants.    Demonstrated understanding of topics discussed through group discussion and participation, Expressed understanding of the relationship between behaviors, thoughts, and feelings and Demonstrated knowledge of personal thought patterns and how they impact their mood and behavior.    Treatment Plan:  Patient has a current master individualized treatment plan.  See Epic treatment plan for more information.    Lashell Leonard      20

## 2023-11-16 NOTE — PROGRESS NOTES
"    Instructions Relayed to Patient by Virtual Roomer:     Patient is active on [employer]:   Relayed following to patient: \"It looks like you are active on [employer], are you able to join the visit this way? If not, do you need us to send you a link now or would you like your provider to send a link via text or email when they are ready to initiate the visit?\"    Reminded patient to ensure they were logged on to virtual visit by arrival time listed. Documented in appointment notes if patient had flexibility to initiate visit sooner than arrival time. If pediatric virtual visit, ensured pediatric patient along with parent/guardian will be present for video visit.     Patient offered the website [email or web address] and/or phone number to [employer] Help line: [phone]      Tito is a 48 year old who is being evaluated via a billable video visit.      How would you like to obtain your AVS? [employer]  If the video visit is dropped, the invitation should be resent by: Text to cell phone: [phone]  Will anyone else be joining your video visit? No          Assessment & Plan     Essential hypertension with goal blood pressure less than 140/90  Patient known to me and we reviewed recent history.  Had an ER visit for constipation that seems to be getting better.  Patient has been losing some weight and following his blood pressure carefully.  Numbers are starting to run in the low normal range.  He is on metoprolol 200 mg twice daily and ramipril 10 mg daily.  I think I would like to reduce the metoprolol initially.  He prefers the twice daily dosage so we will start by dropping this to 300 mg total daily, 200 mg in the morning and 100 mg in the evening.  Follow-up for a few weeks and contact me with results.  Continue ramipril as is.  - metoprolol succinate ER (TOPROL XL) 100 MG 24 hr tablet; 100 mg in the evening, to be used with 200 mg in the morning.    Therapeutic opioid induced constipation  Currently " on Movantik and hopefully now that things have resolved this can continue to help    Chronic pain syndrome  Stable and following with our pain team.     MED REC REQUIRED  Post Medication Reconciliation Status: discharge medications reconciled and changed, per note/orders  See Patient Instructions    Ksenia Lyles MD  [facility] NAWAF Francis is a 48 year old, presenting for the following health issues:  Hospital F/U ([facility] - GI problems Constipation ) and Recheck Medication (Blood Pressure Medication )        11/16/2023     9:03 AM   Additional Questions   Roomed by Jing COREAS   Accompanied by Self       History of Present Illness       Hypertension: He presents for follow up of hypertension.  He does not check blood pressure  regularly outside of the clinic. Outside blood pressures have been over 140/90. He does not follow a low salt diet.     He eats 0-1 servings of fruits and vegetables daily.He consumes 2 sweetened beverage(s) daily.He exercises with enough effort to increase his heart rate 9 or less minutes per day.  He exercises with enough effort to increase his heart rate 3 or less days per week.   He is taking medications regularly.       ED/UC Followup:    Facility:  [facility]  Date of visit: 11/7/2023  Reason for visit: Constipation/GI Issues from previous surgery   Current Status: Taking Colace to help with BM   Hypertension Follow-up    Pt stated that since losing weight Blood pressures are 110/70 when tested, would like to discuss lowering dosage on hypertension medications     Do you check your blood pressure regularly outside of the clinic? No   Are you following a low salt diet? No  Are your blood pressures ever more than 140 on the top number (systolic) OR more   than 90 on the bottom number (diastolic), for example 140/90? No    Video visit with patient today to follow-up on blood pressure and constipation as above.      Review of Systems    Constitutional, HEENT, cardiovascular, pulmonary, gi and gu systems are negative, except as otherwise noted.      Objective           Vitals:  No vitals were obtained today due to virtual visit.    Physical Exam   GENERAL: Healthy, alert and no distress  EYES: Eyes grossly normal to inspection.  No discharge or erythema, or obvious scleral/conjunctival abnormalities.  RESP: No audible wheeze, cough, or visible cyanosis.  No visible retractions or increased work of breathing.    SKIN: Visible skin clear. No significant rash, abnormal pigmentation or lesions.  NEURO: Cranial nerves grossly intact.  Mentation and speech appropriate for age.  PSYCH: Mentation appears normal, affect normal/bright, judgement and insight intact, normal speech and appearance well-groomed.    Past labs reviewed with the patient.             Video-Visit Details    Type of service:  Video Visit   Video Start Time:  0912  Video End Time: 0919    Originating Location (pt. Location): Home    Distant Location (provider location):  Off-site  Platform used for Video Visit: [employer]

## 2023-11-20 DIAGNOSIS — G89.29 CHRONIC INTRACTABLE PAIN: ICD-10-CM

## 2023-11-20 DIAGNOSIS — M79.18 MYOFASCIAL MUSCLE PAIN: ICD-10-CM

## 2023-11-20 RX ORDER — NABUMETONE 500 MG/1
500-1000 TABLET, FILM COATED ORAL 2 TIMES DAILY PRN
Qty: 120 TABLET | Refills: 1 | Status: SHIPPED | OUTPATIENT
Start: 2023-11-20 | End: 2024-05-02

## 2023-11-20 NOTE — TELEPHONE ENCOUNTER
Refill request:  Nabumetone 500 mg tab: last filled 10/2/23   Last office visit 11/14/23  Next appointment 11/30/23

## 2023-11-22 ENCOUNTER — MYC MEDICAL ADVICE (OUTPATIENT)
Dept: PALLIATIVE MEDICINE | Facility: OTHER | Age: 48
End: 2023-11-22
Payer: MEDICARE

## 2023-11-22 DIAGNOSIS — M51.369 DDD (DEGENERATIVE DISC DISEASE), LUMBAR: ICD-10-CM

## 2023-11-22 DIAGNOSIS — M45.8 ANKYLOSING SPONDYLITIS OF SACRAL REGION (H): ICD-10-CM

## 2023-11-22 NOTE — TELEPHONE ENCOUNTER
Please process a refill of oxycodone 5 MG to     [id number] - [place], [place] - [address]  [address]  [place] [address]  Phone: [phone] Fax: [phone]

## 2023-11-22 NOTE — TELEPHONE ENCOUNTER
Katie Nordisk refill forms again faxed to [employer] at [phone] and [phone] with transaction completion noted via rightfax.    Ksenia Hanson CMA  Adult Endocrinology  [facility], [place]

## 2023-11-22 NOTE — TELEPHONE ENCOUNTER
Received call from patient requesting refill(s) of oxyCODONE (ROXICODONE) 5 MG tablet      Last dispensed from pharmacy on 10/30/23    Patient's last office/virtual visit by prescribing provider on 09/28/23  Next office/virtual appointment scheduled for 11/30/23    Last urine drug screen date 06/08/23  Current opioid agreement on file (completed within the last year) Yes Date of opioid agreement: 06/08/23    E-prescribe to pharmacy-[id number] - [place], [place] - [address]     Will route to nursing [place] for review and preparation of prescription(s).

## 2023-11-22 NOTE — TELEPHONE ENCOUNTER
Medication refill information reviewed.     Due date for oxyCODONE (ROXICODONE) 5 MG tablet    is 11/29/23     Prescriptions prepped for review.     Will route to provider.

## 2023-11-23 RX ORDER — OXYCODONE HYDROCHLORIDE 5 MG/1
5 TABLET ORAL 3 TIMES DAILY PRN
Qty: 50 TABLET | Refills: 0 | Status: SHIPPED | OUTPATIENT
Start: 2023-11-23 | End: 2023-11-23

## 2023-11-23 RX ORDER — OXYCODONE HYDROCHLORIDE 5 MG/1
5 TABLET ORAL 3 TIMES DAILY PRN
Qty: 60 TABLET | Refills: 0 | Status: SHIPPED | OUTPATIENT
Start: 2023-11-23 | End: 2023-12-03

## 2023-11-24 ASSESSMENT — PAIN SCALES - PAIN ENJOYMENT GENERAL ACTIVITY SCALE (PEG)
INTERFERED_GENERAL_ACTIVITY: 6
AVG_PAIN_PASTWEEK: 5
PEG_TOTALSCORE: 5.67
INTERFERED_ENJOYMENT_LIFE: 6

## 2023-11-28 ENCOUNTER — PATIENT OUTREACH (OUTPATIENT)
Dept: CARE COORDINATION | Facility: CLINIC | Age: 48
End: 2023-11-28

## 2023-11-28 ENCOUNTER — VIRTUAL VISIT (OUTPATIENT)
Dept: PALLIATIVE MEDICINE | Facility: CLINIC | Age: 48
End: 2023-11-28
Payer: MEDICARE

## 2023-11-28 DIAGNOSIS — M51.369 DDD (DEGENERATIVE DISC DISEASE), LUMBAR: ICD-10-CM

## 2023-11-28 DIAGNOSIS — G62.9 PERIPHERAL POLYNEUROPATHY: ICD-10-CM

## 2023-11-28 DIAGNOSIS — G89.29 CHRONIC INTRACTABLE PAIN: Primary | ICD-10-CM

## 2023-11-28 DIAGNOSIS — G57.12 MERALGIA PARESTHETICA, LEFT LOWER LIMB: ICD-10-CM

## 2023-11-28 DIAGNOSIS — M45.8 ANKYLOSING SPONDYLITIS OF SACRAL REGION (H): ICD-10-CM

## 2023-11-28 PROCEDURE — 96159 HLTH BHV IVNTJ INDIV EA ADDL: CPT | Mod: 95 | Performed by: PSYCHOLOGIST

## 2023-11-28 PROCEDURE — 96158 HLTH BHV IVNTJ INDIV 1ST 30: CPT | Mod: 95 | Performed by: PSYCHOLOGIST

## 2023-11-28 RX ORDER — OXYCODONE HYDROCHLORIDE 5 MG/1
5 TABLET ORAL EVERY 12 HOURS PRN
Qty: 6 TABLET | Refills: 0 | Status: CANCELLED
Start: 2023-11-28 | End: 2023-12-01

## 2023-11-28 NOTE — PROGRESS NOTES
Joel Pineda is a 48 year old male who is being evaluated via a billable video visit.      Patient is currently in the [facility]? yes    Patient would like the video invitation sent by: Text to cell phone: [phone]}    Video Start Time: 4:00 PM  Video Stop Time: 4:55 PM    Additional provider notes:     Pain Diagnoses per pain provider:   Chronic intractable pain     DDD (degenerative disc disease), lumbar     Ankylosing spondylitis of sacral region (H)     Peripheral polyneuropathy     Meralgia paresthetica, left lower limb         DATA: During today's visit you reported the following: Your back pain is manageable with medications. Your mood is mildly worse - believe this is due to increased dose of Lyrica. Your activity level is the same. Your stress level is increased as below. Your sleep is unchanged. You reported engaging in self-care for your pain 2-3 times daily.    You identified that you would like to focus on the following or had questions regarding the following issues or concerns, and we discussed the following:   - enjoyed Thanksgiving with mother and step-father  - using back brace while driving was helpful  - encouraged to discuss pros/cons of continuing higher dose of Lyrica - report blurry vision and worse mood  - discussed acupressure point and self-massage for bowel stimulation to work on constipation  - ongoing Board/NONA and  stress  - purchased plane ticket for [place] over President's Day  - wish relationship with father was different  - looking at social groups online - still exploring, noting your default is to find excuses to not engage  - some anticipatory anxiety about RFA - will it work? Will it hurt?    ASSESSMENT: Tito reports his pain remains high, he is looking forward to RFA on Friday. He seems to recognize the role of stress and his mood on his pain.    PLAN:   Your next appointment is scheduled for 12/12 at 2:00 PM.  Assignment/Objectives  /interventions for next session:   - focus on self-care  - use deep breathes to induce calm during RFA    We believe regular attendance is key to your success in our program!    Any time you are unable to keep your appointment we ask that you call us at [phone] at least 24 hours in advance to cancel.This will allow us to offer the appointment time to another patient.   Multiple missed appointments may lead to dismissal from the clinic.    Telemedicine Visit: The patient's condition can be safely assessed and treated via synchronous audio and visual telemedicine encounter.      Reason for Telemedicine Visit: Services only offered telehealth    Originating Site (Patient Location): Patient's home    Distant Site (Provider Location): Provider Remote Setting- Home Office    Consent:  The patient/guardian has verbally consented to: the potential risks and benefits of telemedicine (video visit) versus in person care; bill my insurance or make self-payment for services provided; and responsibility for payment of non-covered services.     Mode of Communication:  Video Conference via [employer]    As the provider I attest to compliance with applicable laws and regulations related to telemedicine.      Shirley Bartlett PsyD LP  Licensed Psychologist  Outpatient Clinic Therapist  [facility]

## 2023-11-30 ENCOUNTER — OFFICE VISIT (OUTPATIENT)
Dept: PALLIATIVE MEDICINE | Facility: OTHER | Age: 48
End: 2023-11-30
Attending: NURSE PRACTITIONER
Payer: MEDICARE

## 2023-11-30 VITALS
WEIGHT: 286 LBS | SYSTOLIC BLOOD PRESSURE: 112 MMHG | HEART RATE: 85 BPM | DIASTOLIC BLOOD PRESSURE: 70 MMHG | BODY MASS INDEX: 34.36 KG/M2 | OXYGEN SATURATION: 100 %

## 2023-11-30 DIAGNOSIS — E08.42 DIABETIC POLYNEUROPATHY ASSOCIATED WITH DIABETES MELLITUS DUE TO UNDERLYING CONDITION (H): ICD-10-CM

## 2023-11-30 DIAGNOSIS — M51.369 DDD (DEGENERATIVE DISC DISEASE), LUMBAR: ICD-10-CM

## 2023-11-30 DIAGNOSIS — G89.29 CHRONIC INTRACTABLE PAIN: Primary | ICD-10-CM

## 2023-11-30 DIAGNOSIS — M47.816 SPONDYLOSIS OF LUMBAR REGION WITHOUT MYELOPATHY OR RADICULOPATHY: ICD-10-CM

## 2023-11-30 DIAGNOSIS — M45.8 ANKYLOSING SPONDYLITIS OF SACRAL REGION (H): ICD-10-CM

## 2023-11-30 PROCEDURE — G0463 HOSPITAL OUTPT CLINIC VISIT: HCPCS | Performed by: NURSE PRACTITIONER

## 2023-11-30 PROCEDURE — 99214 OFFICE O/P EST MOD 30 MIN: CPT | Performed by: NURSE PRACTITIONER

## 2023-11-30 ASSESSMENT — PAIN SCALES - GENERAL: PAINLEVEL: MODERATE PAIN (4)

## 2023-11-30 NOTE — PROGRESS NOTES
[facility]    CHIEF COMPLAINT: Chronic Pain.    INTERVAL HISTORY:  Last seen on 9/28/23.       Recommendations/plan at the last visit included:  Health Psychology: YES, Continue per your psychologist's recommendations.  Physical therapy: N/A, Continue to get movement, walking in daily as we discussed    30 minute Video or Clinic follow-up with JOCELYN Zavala NP-C on 11/30/23 as scheduled   Ok to schedule up to three follow up appts at a time, alternating virtual and clinic appointments.   Medication Management : Oxycodone refill sent to your pharmacy       Since last visit:   - Increase in Lyrica has finally started helping with pain flare. Does have some increased depression on Lyrica and is looking forward to having RFA tomorrow. He should then  be able to wean down to his usual dose of 150 mg BID  - Being assessed for Multiple System Atrophy vs Parkinson's Disease   - POTS has been worsening, say been dizzy spells, would like to adjust Metoprolol to 100 mg in AM and 200 mg PM.   - Weight loss is going well.   - Enjoys working with Dr Bartlett in pain psychology, finds this to be a valuable connection.     Pain Information today: Moderate Pain (4)/10. Location of pain: multiple areas of pain, Low back is worst     Annual Requirements last collected:  6/8/23 UDS, CSA 7/21/23     Current Pain Relevant Medications:    Acetaminophen 500 mg BID & with Oxycodone.   Cymbalta 120 mg in AM  Lyrica 300 MG BID   Methocarbamol 500 mg 1-2 QID PRN  Nabumetone 500 mg 1-2 times a day  Lidocaine gel topically 2-3 times daily  Risperdal 0.5 mg 1-2 x daily      Pain Related Controlled Substances:   Clonazepam 0.5 mg, 2 tabs at HS.  Lunesta 3 mg  Oxycodone 5 mg 1-2 tabs per day PRN              Total opiate dose: 7.5-15 MME     Previous Pain Relevant Medications: (H--helped; HI--Helped initially; SWH--Somewhat helpful; NH--No help; W--worse; SE--side effects; ?--Unsure if helpful)   NOTE: This  medication information taken from patient's intake form, not medical records.   Opiates: Oxycodone: H, Tramadol:[facility]. SE, Codeine: NH  NSAIDS: Celebrex: [facility], Nabumetone:H, Naproxen: SE  Anti-migraine medications: Midrin? , Maxalt:H  Muscle Relaxants: Baclofen:[facility], Flexeril:H, Tizaidine:?, Methocarbamol:?  Neuropathics: Lyrica:H  Anti-depressants: Abilify:SE, Brintellix: NH, Wellbutrin: ?, Cymbalta: NH, Nortriptyline: NH, Seroquel:   [facility], Risperdal: NH, Effexor:?, Cymbalta ?  Anxiety medications: Xanax: H, Klonpin: H, lorazepam: H      Topicals: Lidocaine:H  Sleep medications:Belsomra: NH, Melatonin:H, Trazodone NH, Ambien:NH  Other medications not covered above: Humira: All, Enbrel; H, dicyclomine:H, Medrol:[facility], Prednisone:H     Any illicit drug use: denies   EtOH use: none   Caffeine use: 6-8 per day   Nicotine use: None     Past Pain Treatments:   Pain Clinic:   Yes  [facility] pain management: For spinal injections with Dr Diaz, [facility]: injections, nerve ablation, [facility] for SCS treatment.  Did trial but did not find it beneficial.   [facility] chronic pain program.    PT: Yes  For other reasons. Neck, back and feet  Psychologist: Yes ongoing with private therapist.at  [facility].   Chiropractor: Yes years ag              Acupuncture: Yes NH  Pharmacotherapy:  Opioids: Yes  Non-opioids:    Yes   TENs Unit:Yes H for back   Injections: Yes Many for neck and back      Surgeries related to pain: Yes   October 2007 L5-S1 laminectomy, micro discectomy          THE 4 As OF OPIOID MAINTENANCE ANALGESIA    Analgesia: Is pain relief clinically significant? YES   Activity: Is patient functional and able to perform Activities of Daily Living? YES   Adverse effects: Is patient free from adverse side effects from opiates? YES   Adherence to Rx protocol: Is patient adhering to Controlled Substance Agreement and taking medications ONLY as ordered? YES     Is Narcan prescribed for opiate use >50 MME daily or concurrent use of  opiates and benzodiazepines?  Yes    [place] Board of Pharmacy Data Base Reviewed:    YES; No concern for abuse or misuse of controlled medications based on this report. Reviewed [facility] November 29, 2023- no concerning fills.      PHYSICAL EXAM    Vitals:    11/30/23 1318   BP: 112/70   Pulse: 85   SpO2: 100%   Weight: 129.7 kg (286 lb)       Constitutional: healthy, alert, and no distress A&O.   Patient is appropriate.  Psychiatric/mental status: Alert, without lethargy or stupor. Appropriate affect.     Neurologic exam:  CN:  Cranial nerves 2-12 are grossly normal.    DIRE Score for ongoing opioid management is calculated as follows:    Diagnosis = 3 pts (advanced condition; severe pain/objective findings)    Intractability = 3 pts (patient fully engaged but inadequate response to treatments)    Risk        Psych = 3 pts (no significant personality dysfunction/mental illness; good communication with clinic)         Chem Hlth = 3 pts (no history of chemical dependency; not drug-focused)       Reliability = 2 pts (occasional difficulties with compliance; generally reliable)       Social = 2 pts (reduction in some relationships/life rolls)       (Psych + Chem hlth + Reliability + Social) = 16    Efficacy = 2 pts (moderate benefit/function; low med dose; too early/not tried meds)    DIRE Score = 18        7-13: likely NOT suitable candidate for long-term opioid analgesia       14-21: may be a suitable candidate for long-term opioid analgesia     Previous Diagnostic Tests:   Imaging Studies:   MR LUMBAR SPINE W/O CONTRAST 6/27/2019  Impression:   1. Multilevel lumbar spondylosis, most pronounced at L5-S1 with  moderate to severe left and moderate right neural foraminal stenosis as well as narrowing of the left lateral recess and abutment the traversing left S1 nerve root.   2. Additional multilevel degenerative changes of the lumbar spine as described above.     PAIN RELEVANT CONDITIONS:   1.  Postherpetic  neuralgia  2.  Ankylosing spondylosis, Lumbar DDD with left S1 nerve involvement, lumbar stenosis  3.  Chronic migraine headaches    DIAGNOSIS AND PLAN:     (G89.29) Chronic intractable pain  (primary encounter diagnosis)  (E08.42) Diabetic polyneuropathy associated with diabetes mellitus due to underlying condition (H)  (M47.816) Spondylosis of lumbar region without myelopathy or radiculopathy  (M51.36) DDD (degenerative disc disease), lumbar  (M45.8) Ankylosing spondylitis of sacral region (H)  Comment: Continue current plan of care. Any changes noted in patient instructions.   Plan: oxyCODONE (ROXICODONE) 5 MG tablet           PATIENT INSTRUCTIONS:     Diagnosis reviewed, treatment option addressed, and risk/benefits discussed.  Self-care instructions given.  I am recommending a multidisciplinary treatment plan to help this patient better manage pain.    Remember to request ALL medication refills 5 BUSINESS days before you run out.       30 minutes Video or Clinic follow-up with JOCELYN Zavala NP-C in 2-3 months . Ok to schedule up to three follow up appts at a time, alternating clinic and video visits.   Medication Management : December refill of Oxycodone sent to your pharmacy     I have reviewed the note as documented above.  This accurately captures the substance of my conversation with the patient.  A total of 15 minutes of preparation, care, and consultation were spent on this visit today.     JOCELYN Padgett, NPLeydaC  [facility]    (Information in italics and blue color are taken from previous pain and consulting medical providers notes and are documented as such)

## 2023-11-30 NOTE — PROGRESS NOTES
Patient presents to the clinic today for a visit with JOCELYN Ron CNP regarding Pain Management.          7/21/2023    12:50 PM 9/28/2023     9:03 AM 11/30/2023     1:20 PM   PEG Score   PEG Total Score 5 7.33 6.67         QUESTIONS:    Domi GUERRIER [facility] Clinical Assistant

## 2023-12-01 ENCOUNTER — RADIOLOGY INJECTION OFFICE VISIT (OUTPATIENT)
Dept: PALLIATIVE MEDICINE | Facility: CLINIC | Age: 48
End: 2023-12-01
Attending: NURSE PRACTITIONER
Payer: MEDICARE

## 2023-12-01 ENCOUNTER — NURSE TRIAGE (OUTPATIENT)
Dept: NURSING | Facility: CLINIC | Age: 48
End: 2023-12-01

## 2023-12-01 VITALS
HEART RATE: 100 BPM | OXYGEN SATURATION: 100 % | SYSTOLIC BLOOD PRESSURE: 115 MMHG | DIASTOLIC BLOOD PRESSURE: 70 MMHG | RESPIRATION RATE: 16 BRPM

## 2023-12-01 DIAGNOSIS — M47.816 LUMBAR FACET ARTHROPATHY: ICD-10-CM

## 2023-12-01 DIAGNOSIS — M47.816 SPONDYLOSIS OF LUMBAR REGION WITHOUT MYELOPATHY OR RADICULOPATHY: ICD-10-CM

## 2023-12-01 DIAGNOSIS — M47.817 LUMBOSACRAL SPONDYLOSIS WITHOUT MYELOPATHY: ICD-10-CM

## 2023-12-01 DIAGNOSIS — G89.18 POST PROCEDURE DISCOMFORT: Primary | ICD-10-CM

## 2023-12-01 PROCEDURE — 99152 MOD SED SAME PHYS/QHP 5/>YRS: CPT | Performed by: PAIN MEDICINE

## 2023-12-01 PROCEDURE — 99153 MOD SED SAME PHYS/QHP EA: CPT | Performed by: PAIN MEDICINE

## 2023-12-01 PROCEDURE — 64635 DESTROY LUMB/SAC FACET JNT: CPT | Mod: 50 | Performed by: PAIN MEDICINE

## 2023-12-01 PROCEDURE — 64636 DESTROY L/S FACET JNT ADDL: CPT | Mod: 50 | Performed by: PAIN MEDICINE

## 2023-12-01 RX ORDER — OXYCODONE HYDROCHLORIDE 5 MG/1
5 TABLET ORAL EVERY 12 HOURS PRN
Qty: 6 TABLET | Refills: 0 | Status: SHIPPED | OUTPATIENT
Start: 2023-12-01 | End: 2023-12-11

## 2023-12-01 RX ORDER — FENTANYL CITRATE 50 UG/ML
12.5-5 INJECTION, SOLUTION INTRAMUSCULAR; INTRAVENOUS EVERY 5 MIN PRN
Status: ACTIVE | OUTPATIENT
Start: 2023-12-01 | End: 2023-12-02

## 2023-12-01 RX ADMIN — FENTANYL CITRATE 25 MCG: 50 INJECTION, SOLUTION INTRAMUSCULAR; INTRAVENOUS at 10:27

## 2023-12-01 RX ADMIN — FENTANYL CITRATE 25 MCG: 50 INJECTION, SOLUTION INTRAMUSCULAR; INTRAVENOUS at 10:12

## 2023-12-01 RX ADMIN — FENTANYL CITRATE 25 MCG: 50 INJECTION, SOLUTION INTRAMUSCULAR; INTRAVENOUS at 10:19

## 2023-12-01 ASSESSMENT — PAIN SCALES - GENERAL
PAINLEVEL: MILD PAIN (3)
PAINLEVEL: MODERATE PAIN (5)

## 2023-12-01 NOTE — PROGRESS NOTES
Pre procedure Diagnosis: lumbosacral spondylosis  Post procedure Diagnosis: Same  Procedure performed: Bilateral lumbosacral radiofrequency ablation at L4, L5, sacral ala, fluoroscopically guided  Anesthesia: MD Time IN: 1045   Sedation start time:  1049  Sedation end time:  1135          MD Time IN: 1010   Sedation start time:  1011  Sedation end time:  1105     Medications given: fentanyl 75 mcg IV; versed 2 mg IV; toradol na mg IV  Intravenous fluids were administered, normal saline 200 cc's.  Sedation Level Achieved:  Moderate (conscious) sedation           Complications: none  Operators: Bubba Montgomery MD     Indications:   Joel Pineda is a 48 year old male. The patient has a history of axial lbp.  Exam shows + kemps and they have tried conservative treatment including meds/pt/prior rfa with sig benefit >9months    Options/alternatives, benefits and risks were discussed with the patient including bleeding, infection, no pain relief, tissue trauma, exposure to radiation, reaction to medications including seizure, spinal cord injury,increased pain after the procedure, weakness, numbness or sensory changes and headache.   We also discussed risks of sedation, including reaction to medications and cardiovascular collapse.    Questions were answered to her satisfaction and she agrees to proceed. Voluntary informed consent was obtained and signed.     Vitals were reviewed: Yes  Allergies were reviewed:  Yes   Medications were reviewed:  Yes   Pre-procedure pain score: 3/10    Procedure:  After obtaining signed, informed consent, the patient was brought into the procedure suite and was placed in a prone position on the procedure table.   A Pause for the Cause was performed.  Patient was prepped and draped in sterile fashion.     PT had an IV line placed prior the procedure.  The C-arm was positioned in the left oblique view to afford optimal view of the L4-L5 vertebral bodies. Lidocaine 1% was used to anesthetize  the skin at each level.  At each level, a 15cm, 20G curved radiofrequency cannula with a 10mm active tip was positioned, overlying the intersection of the transverse process and pedicle at L4 & L5, and was advanced under intermittent fluoroscopy until it contacted the transverse process and notch, and the tip slightly overran that process, just lateral to the mamillary process.  The position of each cannula was verified and optimized in the oblique view and AP views.    In the AP view, another cannula was placed at the sacral alar notch.      Each position was tested for motor and sensory stimulation, and was positioned so that stimulation was negative for stimuli outside the immediate area of the desired lesion.  Sensory stimulation was completed at 50 Hz, with max stimulation up to 0.3V.  Motor stimulation was completed at 2Hz, up to 1.5V.   Bupivacaine 0.5 % 1ml was injected at each level.  After allowing the local anesthetic to set up, a 90 second, 80 degree Celsius lesion was generated at all three levels.    The needles were then rotated within the pathway of the medial branch, and locations were evaluated with repeat imaging.  A second lesion was then generated at each location.    The procedure was then repeated on the right side, using the same technique as described above.  Sensory stimulation was positive at 0.3 V and motor stimulation was negative at 1.5 V except for multifidus movement.    The needles were flushed with the local anesthetic as they were withdrawn.        Hemostasis was achieved, the area was cleaned, and bandaids were placed when appropriate.  The patient tolerated the procedure well, and was taken to the recovery room.    Images were saved to PACS.    Post-procedure pain score: 3/10  Follow-up includes:   -post-procedure pain medications: oxycodone 5mg bid prn disp 6 tabs  -f/u with referring provider 8 weeks    Bubba Montgomery MD  [facility]      '

## 2023-12-01 NOTE — NURSING NOTE
Discharge Information    IV Discontiued Time:  1115    Amount of Fluid Infused:  200    Discharge Criteria = When patient returns to baseline or as per MD order    Consciousness:  Pt is fully awake    Circulation:  BP +/- 20% of pre-procedure level    Respiration:  Patient is able to breathe deeply    O2 Sat:  Patient is able to maintain O2 Sat >92% on room air    Activity:  Moves 4 extremities on command    Ambulation:  Patient is able to stand and walk or stand and pivot into wheelchair    Dressing:  Clean/dry or No Dressing    Notes:   Discharge instructions and AVS given to patient    Patient meets criteria for discharge?  YES    Admitted to PCU?  No    Responsible adult present to accompany patient home?  Yes    Signature/Title:    Ovi Herzog RN  RN Care Coordinator  [facility] [place]

## 2023-12-01 NOTE — TELEPHONE ENCOUNTER
"Received after hours page via [employer]    Returned patient call.     Reports he had lumbar RFA done today by  Dr. Bubba Montgomery.     This after noon when he went up some stairs his right knee buckled underneath him.   He has no pain or edema.  He notes he has had previous EMG showing right peroneal neuropathy.  He has not had previous right knee weakness.     Procedure today involved lumbar facet joints. Patient denies any ongoing knee weakness, numbness/tingling, pain or edema. In the absence of any continued weakness, pain, edema, numbness or tingling, I do not believe that this right knee weakness on the stairs is related to the lumbar RFA.     Patient educated about procedure he had today and instructed that should further knee weakness  or pain/swelling at the knee site occur, he should be seen in urgent care. Patient verbalized understanding.     Domi BANKS, RN CNP, FNP  [facility]  [facility]    Nurse Triage SBAR    Is this a 2nd Level Triage? YES, LICENSED PRACTITIONER REVIEW IS REQUIRED    Situation: patient was going up the stairs at home after procedure and his right knee folded under him.  Discharge instructions state to call with weakness.  Pt. Fell to his knee, no head injury.  No pain to knee,  denies numbness or tingling to leg  Patient currently settled into recliner    Background:   Procedure performed: Bilateral lumbosacral radiofrequency ablation at L4, L5, sacral ala, fluoroscopically guided   Bubba Montgomery MD   on 12-1-2023      Assessment:   Patient returned home from procedure today.  Going up stairs and his right knee folded under him  Pt fell to knees, no head injury  No pain to knee  Denies numbness or tingling.  Tried to put weight on it again, \"felt abnormal\"      Protocol Recommended Disposition:   Routing to pain for a call back        Routed to provider        Reason for Disposition   Caller has URGENT question and triager unable to " "answer question    Additional Information   Negative: Bright red, wide-spread, sunburn-like rash   Negative: SEVERE headache and after spinal (epidural) anesthesia   Negative: Vomiting and persists > 4 hours   Negative: Vomiting and abdomen looks much more swollen than usual   Negative: Drinking very little and dehydration suspected (e.g., no urine > 12 hours, very dry mouth, very lightheaded)   Negative: Sounds like a serious complication to the triager   Negative: Patient sounds very sick or weak to the triager   Negative: Fever > 100.4 F (38.0 C)    Answer Assessment - Initial Assessment Questions  1. SYMPTOM: \"What's the main symptom you're concerned about?\" (e.g., pain, fever, vomiting)      Right knee weakness afater getting home and going up the stairs.  Procedure today    2. ONSET: \"When did   start?\"      After procedure, when at home  3. SURGERY: \"What surgery did you have?\"      rocedure performed: Bilateral lumbosacral radiofrequency ablation at L4, L5, sacral ala, fluoroscopically guided   Bubba Montgomery MD   on 12-1-2023    4. DATE of SURGERY: \"When was the surgery?\"       12-1-2023  5. ANESTHESIA: \" What type of anesthesia did you have?\" (e.g., general, spinal, epidural, local)        6. PAIN: \"Is there any pain?\" If Yes, ask: \"How bad is it?\"  (Scale 1-10; or mild, moderate, severe)      No paini n knee.  Pain in the lower back.  3/10  took some Oxycodone  7. FEVER: \"Do you have a fever?\" If Yes, ask: \"What is your temperature, how was it measured, and when did it start?\"      no  8. VOMITING: \"Is there any vomiting?\" If Yes, ask: \"How many times?\"      no  9. BLEEDING: \"Is there any bleeding?\" If Yes, ask: \"How much?\" and \"Where?\"      no  10. OTHER SYMPTOMS: \"Do you have any other symptoms?\" (e.g., drainage from wound, painful urination, constipation)        No drainage, no urination since procedure.  No BM since procedure.    Protocols used: Post-Op Symptoms and [id number]    "

## 2023-12-01 NOTE — PATIENT INSTRUCTIONS
[facility]   Radiofrequency Ablation (RFA) Discharge Instructions     Today you saw:    Dr. Bubba Montgomery    You should anticipate procedural pain for up to 2 weeks.   You may receive a prescription for pain medication and you should take this as directed.   Oxycodone - one tablet every 12 hours as needed for Pain.  This is in addition to your normal chronic pain medication  It may take up to 8 weeks to receive relief from the RFA   Follow up with your provider in 6 weeks.   If you received sedation before, during or after your procedure, for the next 24 hours you shall NOT:    -Drive    -Operate machinery    -Drink alcohol    -Sign any legal documents   You may resume your normal diet   You may resume your regular medications after the procedure   If you were holding your blood thinning medication, please restart taking it: N/A  Be cautious with walking. Numbness and/or weakness in the lower extremities may occur for up to 6-8 hours due to effect of local anesthetic  Avoid strenuous activity for the first 24 hours   You may resume your regular activities after 24 hours   You may shower, however no swimming, tub baths or hot tubs for 24 hours following your procedure   You may use ice packs 10-15 minutes three to four times a day at the injection site for comfort   Do not use heat to painful areas for 6 to 8 hours. This will give the local anesthetic time to wear off and prevent you from accidentally burning your skin.   Unless you have been directed to avoid the use of anti-inflammatory medications (NSAIDS), you may use medications such as ibuprofen, Aleve or Tylenol for pain control if needed.   If you experience any of the following, call the Pain Clinic during work hours (Monday through Friday 8 am-4:30 pm) at [phone] or the Provider Line after hours at [phone]:   -Fever over 100 degree F    -Swelling, bleeding, redness, drainage, warmth at the injection site     -Progressive weakness or numbness in your legs or arms    -Loss of bowel or bladder function    -Unusual headache that is not relieved by Tylenol    -Unusual new onset of pain that is not improving

## 2023-12-01 NOTE — NURSING NOTE
Pre-procedure Intake  If YES to any questions or NO to having a   Please complete laminated checklist and leave on the computer keyboard for Provider, verbally inform provider if able.    For SCS Trial, RFA's or any sedation procedure:  Have you been fasting? Yes  If yes, for how long? More than 6 hours    Are you taking any any blood thinners such as Coumadin, Warfarin, Jantoven, Pradaxa Xarelto, Eliquis, Edoxaban, Enoxaparin, Lovenox, Heparin, Arixtra, Fondaparinux, or Fragmin? OR Antiplatelet medication such as Plavix, Brilinta, or Effient?   No   If yes, when did you take your last dose? NA    Do you take aspirin?  Yes -   ASA  If cervical procedure, have you held aspirin for 6 days?   No 81 mg    Do you have any allergies to contrast dye, iodine, steroid and/or numbing medications?  YES: Contrast Dye    Are you currently taking antibiotics or have an active infection?  NO    Have you had a fever/elevated temperature within the past week? NO    Are you currently taking oral steroids? NO    Do you have a ? Yes    Are you pregnant or breastfeeding?  Not Applicable    Have you received the COVID-19 vaccine? Yes  If yes, was it your 1st, 2nd or only dose needed? 7  Date of most recent vaccine: 09/21/23    Notify provider and RNs if systolic BP >170, diastolic BP >100, P >100 or O2 sats < 90%

## 2023-12-01 NOTE — NURSING NOTE
MD Time IN: 1010   Sedation start time:  1011  Sedation end time:  1105    Medications given: fentanyl 75 mcg IV; versed 2 mg IV; toradol na mg IV  Intravenous fluids were administered, normal saline 200 cc's.  Sedation Level Achieved:  Moderate (conscious) sedation    Ovi Herzog, RN  Patient Care Supervisor   [facility]

## 2023-12-03 RX ORDER — OXYCODONE HYDROCHLORIDE 5 MG/1
5 TABLET ORAL 3 TIMES DAILY PRN
Qty: 60 TABLET | Refills: 0 | Status: SHIPPED | OUTPATIENT
Start: 2023-12-03 | End: 2024-01-19

## 2023-12-04 DIAGNOSIS — Z91.018 FOOD ALLERGY: ICD-10-CM

## 2023-12-05 RX ORDER — EPINEPHRINE 0.3 MG/.3ML
INJECTION SUBCUTANEOUS
Qty: 2 EACH | Refills: 0 | Status: SHIPPED | OUTPATIENT
Start: 2023-12-05 | End: 2023-12-11

## 2023-12-05 NOTE — TELEPHONE ENCOUNTER
Katie Nordisk medications received 12/05/2023 in office.     Patient notified via telephone that meds are here and ready for .     Patient states may be in 12/06/2023 to .     Gave patient instructions for when he arrives to go to Desk C and let them know he is here for med  from Adult Endo.       Radha Estevez, LUDA  Adult Endocrinology   [facility]

## 2023-12-06 NOTE — CONFIDENTIAL NOTE
Writer gave medications to patient in person at [facility] office.       Radha Estevez [facility]  Adult Endocrinology   [facility]

## 2023-12-11 ENCOUNTER — MYC MEDICAL ADVICE (OUTPATIENT)
Dept: ENDOCRINOLOGY | Facility: CLINIC | Age: 48
End: 2023-12-11

## 2023-12-11 ENCOUNTER — OFFICE VISIT (OUTPATIENT)
Dept: ENDOCRINOLOGY | Facility: CLINIC | Age: 48
End: 2023-12-11
Payer: MEDICARE

## 2023-12-11 VITALS
DIASTOLIC BLOOD PRESSURE: 70 MMHG | HEART RATE: 85 BPM | SYSTOLIC BLOOD PRESSURE: 104 MMHG | OXYGEN SATURATION: 97 % | BODY MASS INDEX: 34 KG/M2 | WEIGHT: 283 LBS

## 2023-12-11 DIAGNOSIS — E23.6: ICD-10-CM

## 2023-12-11 DIAGNOSIS — E29.1 HYPOGONADISM MALE: ICD-10-CM

## 2023-12-11 DIAGNOSIS — E03.9 ACQUIRED HYPOTHYROIDISM: Primary | ICD-10-CM

## 2023-12-11 DIAGNOSIS — E11.8 TYPE 2 DIABETES MELLITUS WITH COMPLICATION, WITHOUT LONG-TERM CURRENT USE OF INSULIN (H): ICD-10-CM

## 2023-12-11 LAB — HBA1C MFR BLD: 5.3 % (ref 4.3–?)

## 2023-12-11 PROCEDURE — 83036 HEMOGLOBIN GLYCOSYLATED A1C: CPT | Performed by: INTERNAL MEDICINE

## 2023-12-11 PROCEDURE — 99214 OFFICE O/P EST MOD 30 MIN: CPT | Mod: 24 | Performed by: INTERNAL MEDICINE

## 2023-12-11 NOTE — PROGRESS NOTES
[facility]ology [facility]             Follow up for hypothyroidism, obesity, low testosterone and T2DM      Assessment:  Middle-aged man with morbid obesity, type 2 diabetes, hypothyroidism and co-morbidity of dyslipidemia POTS chronic pain syndrome on chronic pain medications, bipolar disorder    Obesity patient had been on Ozempic 1 mg weekly, he tolerates this well without side effects.  He notes good effect with his portion size control.  He has lost 35 pounds since summer 2022 and continues to lose weight.    Type 2 diabetes patient is doing very well on Ozempic.  A1c in the normal range, diabetes is in remission     Peripheral neuropathy this seems to be increasing and right foot also affecting remainder of his right leg, more so than on the left, scheduled to see neurosurgery    Low testosterone testosterone has been low twice in 2022, patient since has been able to move more than 10% of his body weight and Reglan which does seem to have caused hyperprolactinemia was discontinued.  Clinically however patient still has symptoms of low testosterone including very low libido and erectile dysfunction.  Reviewed check labs again, reviewed potential treatment options if indicated, patient is interested in injections.  Adverse effects including worsening of sleep apnea, hypogonadism, growth stimulation of ocular testicular cancer were reviewed.    Hypothyroidism doing well on levothyroxine 75 mcg daily      Plan:   Continue levothyroxine 75 mcg daily  Continue Ozempic to 1 mg weekly    Recheck labs for Prolactin, TSH, testosterone, LH,     This note was generated using computer recognized voice recognition. This might result in some expected imperfection.    Abbie Cuenca MD  Endocrinology and Diabetes  Telephone contact:  [facility] [place] [phone]  [facility] [phone]        Interval history  Pt had testosterone checked twice since last  visit, boht were drawn in the morning and low    Pt stopped Reglan due to elevated Prolactin in 9/23;    Has been on Ozempic 1 mg weekly, started treatment in 2022  Initial problems GI related, doing ok with gastroparesis,     Has been on LT4 75 mcg. Takes this      Testosterone:   Low libido decrease in sex drive, years  Erectile dysfunction no med  Has never been on testosterone before  Sleep: snoring CPAP,     Urinary symptoms: urinary frequency no, dysuria no, noturia once a nigth  Head trauma no ECT  Testicular trauma no  Drug use oxcontin  Etoh use no  Depression yes    FH of prostate Ca no    History of neurologic disorder (spinal cord / brain injury, Parkinson, MS, stroke, major surgery) spinal surgery, foraminal narrowin  Vascular (HTN, DM, dyslipidemia, smoking, radiotherapy): yes   Medication: (Antihypertensives, antidepressants, antipsychotics, antiandrogens) yes multiple    Prior medications  Intolerant to metformin due to GI side effects  Intolerance to sulfonylureas due to hypoglycemia    Family history of type 2 diabetes in his mother      Current Problem List:   Patient Active Problem List   Diagnosis    Intermittent asthma    Chronic nonallergic rhinitis    Diverticulosis    GERD (gastroesophageal reflux disease)    Generalized anxiety disorder    LLOYD (obstructive sleep apnea)- mild (AHI 11)    Intracranial arachnoid cyst    Facet arthritis of cervical region    Acquired hypothyroidism    Chronic midline low back pain without sciatica    Irritable bowel syndrome with diarrhea    B12 deficiency    Essential hypertension with goal blood pressure less than 140/90    Chronic, continuous use of opioids    Ankylosing spondylitis of sacral region (H)    Morbid obesity (H)    Fatty infiltration of liver    DDD (degenerative disc disease), lumbar    Type 2 diabetes mellitus with complication, without long-term current use of insulin (H)    Peripheral polyneuropathy    History of pulmonary embolism     Ingrown toenail    Hypertriglyceridemia    Gastroparesis    Orthostatic dizziness    POTS (postural orthostatic tachycardia syndrome)    PHN (postherpetic neuralgia)    Dysautonomia (H)    Chronic pain syndrome    Borderline personality disorder (H)    MDD (major depressive disorder), recurrent severe, without psychosis (H)    Folliculitis    Immunosuppression (H24)    Small fiber neuropathy    RBD (REM behavioral disorder)    Hyperlipidemia LDL goal <70    Anal fissure    Anorectal disorder    Benign neoplasm of ascending colon    Benign neoplasm of cecum    Altered bowel function    Digestive symptom    Dysphagia    Generalized abdominal pain    History of colitis    Internal hemorrhoids    Nausea    Perianal abscess    Hemorrhage of rectum and anus    Therapeutic opioid induced constipation       Past Medical and Past Surgical History:  Past Medical History:   Diagnosis Date    Acne     Acquired hypothyroidism     Allergic state     Ankylosing spondylitis lumbar region (H)     Ankylosing spondylitis of sacral region (H)     Anxiety     Bipolar 2 disorder (H)     Chest pain     Chest pain, regulated w/BP meds. Clear arteries.    Chronic pain     DDD (degenerative disc disease), lumbar     Depressive disorder     Diabetes (H)     Diverticulosis     Facet arthritis of cervical region     Gastroesophageal reflux disease     Hypertension     IBS (irritable bowel syndrome)     Intracranial arachnoid cyst     Major depressive disorder, recurrent episode (H24)     Multiple psych providers - they manage meds August 2015: Provigil induced severe mood dis-function     Major depressive disorder, recurrent episode, severe (H) 12/2/2020    MDD (major depressive disorder), recurrent severe, without psychosis (H) 7/21/2021    Mixed dyslipidemia 4/6/2023    LLOYD (obstructive sleep apnea)- mild (AHI 11)     Polyneuropathy     Pulmonary embolism (H)     Skin exam, screening for cancer 12/3/2013    Sleep apnea     Uncomplicated  "asthma        Past Surgical History:   Procedure Laterality Date    BACK SURGERY  10/2007    lumbar discectomy L5-S1    BIOPSY  09/15/2020    Colon Adenomas x2    COLONOSCOPY      Note: colonoscopy scheduled with [facility] on Friday, 9/4/15    COSMETIC SURGERY  2012    Nose Exterior - functional    GI SURGERY  08/2013    Sigmoidectomy    HERNIA REPAIR, UMBILICAL  08/23/2011    [place], Dr. Evan Beavers    INCISION AND DRAINAGE, ABSCESS, COMPLEX  08/23/2011    umbilical, Dr. Evan Beavers    LAPAROSCOPIC ASSISTED COLECTOMY LEFT (DESCENDING)  08/15/2013    Procedure: LAPAROSCOPIC ASSISTED COLECTOMY LEFT (DESCENDING);  Laparoscopic Hand Assisted Sigmoid Resection, Mobilization of Splenic Fissure, coloproctoscopy, *Latex Free Room* Anesthesia General with Pain block  ;  Surgeon: Aurora Justice MD;  Location: [facility] OR    NERVE SURGERY  08/18/2011    RF ablation @ L3-S1 @ [facility]    RECONSTRUCT NOSE AND SEPTUM (FUNCTIONAL)  10/14/2011    Procedure:RECONSTRUCT NOSE AND SEPTUM (FUNCTIONAL); Functional Septorhinoplasty, Turbinate Reduction, ; Surgeon:CEDRIC CUEVAS; Location:[facility] OR    SINUS SURGERY  10/01/2001    ethmoidectomy chronic sinusitis    SOFT TISSUE SURGERY  4/7/2022    Hidradenitis Suppurativa surgery       Medications:       Current Outpatient Medications   Medication Sig Dispense Refill    acetaminophen 500 MG CAPS Take 1,000 mg by mouth 3 times daily 60 capsule     albuterol (PROAIR HFA/PROVENTIL HFA/VENTOLIN HFA) 108 (90 Base) MCG/ACT inhaler INHALE 2 PUFFS BY MOUTH EVERY 4 HOURS AS NEEDED FOR SHORTNESS OF BREATH, DYSPNEA, OR WHEEZING 25.5 g 3    aspirin (ASA) 81 MG tablet Take 81 mg by mouth daily       B-D LUER-LUCILLE SYRINGE 25G X 1\" 3 ML MISC USE WITH B12 INJECTIONS 12 each 0    buPROPion (WELLBUTRIN XL) 300 MG 24 hr tablet Take 1 tablet by mouth daily      cholecalciferol (D3-50) 1250 mcg (03149 units) capsule TAKE ONE CAPSULE BY MOUTH EVERY 2 WEEKS 24 capsule 0    clonazePAM (KLONOPIN) 0.5 MG tablet Take 1 mg by " mouth At Bedtime For RBD      cyanocobalamin (CYANOCOBALAMIN) 1000 MCG/ML injection INJECT 1 ML INTO THE MUSCLE EVERY 30 DAYS 10 mL 0    diphenhydrAMINE (BENADRYL) 25 MG capsule Take 25 mg by mouth once a week Before Enbrel injection      docusate sodium (COLACE) 50 MG capsule Take 100 mg by mouth 2 times daily      droNABinol (MARINOL) 5 MG capsule Take 5 mg by mouth      droNABinol (MARINOL) 5 MG capsule Take 5 mg by mouth 2 times daily as needed      DULoxetine (CYMBALTA) 60 MG capsule Take 120 mg by mouth daily       EPINEPHrine (ANY BX GENERIC EQUIV) 0.3 MG/0.3ML injection 2-pack INJECT 0.3ML (0.3MG) INTO THE MUSCLE ONCE AS NEEDED FOR ANAPHYLAXIS 2 each 0    eszopiclone (LUNESTA) 3 MG tablet Take 3 mg by mouth At Bedtime       etanercept (ENBREL SURECLICK) 50 MG/ML autoinjector Inject 50 mg Subcutaneous once a week . Hold for signs of infection, and seek medical attention. 4 mL 7    famotidine (PEPCID) 20 MG tablet Take 20 mg by mouth daily      fenofibrate (TRICOR) 48 MG tablet TAKE ONE TABLET BY MOUTH ONCE DAILY 90 tablet 0    fexofenadine (ALLEGRA) 180 MG tablet Take 180 mg by mouth daily      fluticasone-salmeterol (ADVAIR HFA) 45-21 MCG/ACT inhaler INHALE TWO PUFFS BY MOUTH TWICE DAILY 36 g 3    levothyroxine (SYNTHROID/LEVOTHROID) 75 MCG tablet TAKE ONE TABLET BY MOUTH EVERY MORNING 90 tablet 1    lidocaine (XYLOCAINE) 5 % external ointment Apply topically 2 times daily as needed for moderate pain 50 g 3    melatonin 3 MG tablet Take 13 mg by mouth nightly as needed       methocarbamol (ROBAXIN) 500 MG tablet TAKE 1 - 2 TABLETS BY MOUTH 4 TIMES DAILY AS NEEDED FOR MUSCLE SPASMS 240 tablet 1    metoprolol succinate ER (TOPROL XL) 100 MG 24 hr tablet 100 mg in the evening, to be used with 200 mg in the morning. 90 tablet 0    metoprolol succinate ER (TOPROL XL) 200 MG 24 hr tablet Take 1 tablet (200 mg) by mouth 2 times daily 180 tablet 1    montelukast (SINGULAIR) 10 MG tablet TAKE ONE TABLET BY MOUTH  EVERY EVENING 90 tablet 3    nabumetone (RELAFEN) 500 MG tablet Take 1-2 tablets (500-1,000 mg) by mouth 2 times daily as needed for moderate pain 120 tablet 1    naloxegol (MOVANTIK) 12.5 MG TABS tablet Take 12.5 mg by mouth every morning (before breakfast)      naloxone (NARCAN) 4 MG/0.1ML nasal spray Spray 1 spray (4 mg) into one nostril alternating nostrils once as needed for opioid reversal every 2-3 minutes until assistance arrives 0.2 mL 0    olopatadine (PATADAY) 0.2 % ophthalmic solution Place 1 drop into both eyes daily 2.5 mL 3    omega-3 acid ethyl esters (LOVAZA) 1 g capsule TAKE TWO CAPSULES BY MOUTH TWICE DAILY 360 capsule 2    omeprazole (PRILOSEC) 20 MG DR capsule Take 20 mg by mouth as needed      ondansetron (ZOFRAN ODT) 8 MG ODT tab Take 1 tablet (8 mg) by mouth every 8 hours as needed for nausea 20 tablet 0    oxyCODONE (ROXICODONE) 5 MG tablet Take 1 tablet (5 mg) by mouth 3 times daily as needed for pain Ok to fill 12/27/23 to start 12/29/23. 30 day supply for chronic pain. 60 tablet 0    oxyCODONE (ROXICODONE) 5 MG tablet Take 1 tablet (5 mg) by mouth every 12 hours as needed for severe pain 6 tablet 0    pregabalin (LYRICA) 150 MG capsule Take 1 capsule (150 mg) by mouth 3 times daily 270 capsule 1    pseudoePHEDrine-guaiFENesin (MUCINEX D)  MG 12 hr tablet Take 1 tablet by mouth every 12 hours      pyridostigmine (MESTINON) 60 MG tablet Take 1 tablet by mouth 3 times daily      ramipril (ALTACE) 10 MG capsule TAKE ONE CAPSULE BY MOUTH ONCE DAILY 90 capsule 1    risperiDONE (RISPERDAL) 0.5 MG tablet Take 0.5 mg by mouth 2 times daily as needed      rizatriptan (MAXALT) 10 MG tablet Take 1 tablet (10 mg) by mouth at onset of headache for migraine May repeat in 2 hours. Max 3 tablets/24 hours. 30 tablet 1    rosuvastatin (CRESTOR) 40 MG tablet Take 1 tablet (40 mg) by mouth daily 90 tablet 1    Semaglutide, 1 MG/DOSE, 4 MG/3ML SOPN Inject 1 mg Subcutaneous once a week 3 mL 11    sodium  fluoride 1.1 % CREA At Bedtime      spacer (OPTICKeyade JOHNNY) holding chamber To be used in conjunction with albuterol (PROAIR HFA/PROVENTIL HFA/VENTOLIN HFA) 108 (90 Base) MCG/ACT inhaler 1 each 0    valACYclovir (VALTREX) 500 MG tablet Take 1 tablet (500 mg) by mouth daily 90 tablet 3    vitamin B complex with vitamin C (STRESS TAB) tablet Take 1 tablet by mouth daily      ZINC SULFATE-VITAMIN C MT Take 1 tablet by mouth daily         Allergies:   Allergies   Allergen Reactions    Amoxicillin-Pot Clavulanate Difficulty breathing    Banana Shortness Of Breath     Pt reports organic Banana is okay.     Nitroglycerin Palpitations    Penicillins Anaphylaxis    Provigil [Modafinil] Shortness Of Breath     headache    Gadolinium Hives and Itching     Patient was premedicated for the contrast allergy. He did still have a reaction a few hours after injection. Hives and itching. Dr. Gomez told tech to inform pt he should only have contrast again in the future when premedicated and at a hospital. Not at an outpatient facility.     Influenza Virus Vaccine Rash     Quadrivalent, cell based    Ketoconazole      Topical cream caused swelling and itching    Dye [Contrast Dye] Other (See Comments) and Hives     Moderate flushing, CT contrast    Gabapentin      Other reaction(s): hives    Golimumab      Hives, bradycardia, face swelling    Naproxen      Other reaction(s): Bleeding Gums    Neurontin [Gabapentin] Hives     Moderate hives    Nortriptyline Hives    Nystatin Hives    Varicella Virus Vaccine Live      Rash    Adhesive Tape Rash    Baclofen Other (See Comments)     Cognitive changes    Flagyl [Metronidazole Hcl] Palpitations and Hives    Latex Rash    Metronidazole Palpitations, Other (See Comments) and Rash     dizziness (versus ciprofloxacin taken at same time)       Social History     Tobacco Use    Smoking status: Never    Smokeless tobacco: Never   Substance Use Topics    Alcohol use: Not Currently      "Comment: occ 1/week       Family History   Problem Relation Age of Onset    Musculoskeletal Disorder Mother         back    Anxiety Disorder Mother     Colon Polyps Mother     Ulcerative Colitis Mother         and ischemic small intestine, surgery    Hypertension Mother     Breast Cancer Mother     Osteoporosis Mother     Diabetes Mother         Type 2, Diagnosed in 2014    Depression Mother         Takes Cymbalta to help with chronic pain + depx    Thyroid Disease Mother         Hypothyroidism    Obesity Mother         Under much better control latter half of 2015    Musculoskeletal Disorder Father         back    Substance Abuse Father         Alcohol    Hypertension Father     Hyperlipidemia Father     Depression Father         Off meds for many years. Seems \"ok\"    Anxiety Disorder Father     Heart Disease Maternal Grandmother     Heart Disease Maternal Grandfather     Psychotic Disorder Paternal Grandfather     Suicide Paternal Grandfather     Depression Paternal Grandfather         Pediatrician. Committed suicide by pistol in 1990.    Musculoskeletal Disorder Brother         back    Depression Brother         Expressed as anger and moodiness    Substance Abuse Brother         Alcohol    Anxiety Disorder Brother     Substance Abuse Sister         Alcohol    Depression Sister         Mental Health Therapist, yet no anti-depressants?    Anxiety Disorder Sister         Mental Health Therapist, yet no anti-anxiety meds?    Other Cancer Other         Bladder    Substance Abuse Brother     Colon Cancer No family hx of     Crohn's Disease No family hx of     Anesthesia Reaction No family hx of     Cancer No family hx of         No family history of skin cancer       Physical Examination:  /70 (BP Location: Left arm, Patient Position: Sitting, Cuff Size: Adult Large)   Pulse 85   Wt 128.4 kg (283 lb)   SpO2 97%   BMI 34.00 kg/m      Wt Readings from Last 4 Encounters:   12/11/23 128.4 kg (283 lb)   11/30/23 " 129.7 kg (286 lb)   09/14/23 132 kg (291 lb)   09/13/23 131.1 kg (289 lb)       Wt Readings from Last 4 Encounters:   11/30/23 129.7 kg (286 lb)   09/14/23 132 kg (291 lb)   09/13/23 131.1 kg (289 lb)   06/14/23 134.2 kg (295 lb 14.4 oz)     10/19/22 : 140.6 kg (310 lb)  08/10/22 : 142.9 kg (315 lb)  07/11/22 : 143.8 kg (317 lb)  06/08/22 : 144.6 kg (318 lb 12.8 oz)      General: Well appearing tall obese man  in no distress,  Eyes: EOMI. Sclerae and conjunctivae are clear.     A1c today 5.3%     Labs and Studies:     Lab Results   Component Value Date     05/05/2023    CO2 22 05/05/2023    CHLORIDE 105 05/05/2023    CR 1.01 05/05/2023    TRIG 277 (H) 02/15/2023    HDL 34 (L) 02/15/2023    HGB 15.3 05/05/2023     Lab Results   Component Value Date    ALT 25 05/05/2023    AST 31 05/05/2023    BILITOTAL 0.2 05/05/2023    CR 1.01 05/05/2023     05/05/2023    TSH 1.56 02/15/2023    ALKPHOS 70 05/05/2023    TESTOSTTOTAL 194 (L) 08/03/2022    TESTOSTTOTAL 203 (L) 07/25/2022    TESTOSTTOTAL 354 03/19/2013    SHBGT 17 08/03/2022    SHBGT 15 07/25/2022    FT 5.12 08/03/2022    FT 5.64 07/25/2022    PSA 0.33 09/28/2015    TRIG 277 (H) 02/15/2023    TRIG 367 (H) 04/21/2022    TRIG 545 (H) 02/10/2022    LDL 44 02/15/2023    HDL 34 (L) 02/15/2023    HDL 38 (L) 04/21/2022    HDL 29 (L) 02/10/2022    PROLACTIN 25 (H) 01/19/2012     Lab Results   Component Value Date     05/05/2023    CHLORIDE 105 05/05/2023    CO2 22 05/05/2023    GLC 96 05/05/2023    CR 1.01 05/05/2023    CR 0.94 02/15/2023    CR 0.98 10/10/2022    CR 0.95 02/10/2022    CR 0.74 11/10/2021    ALYCE 10.1 (H) 05/05/2023    MAG 2.1 08/19/2013    ALBUMIN 4.7 05/05/2023    ALKPHOS 70 05/05/2023    LDL 44 02/15/2023    HDL 34 (L) 02/15/2023    TRIG 277 (H) 02/15/2023    TSH 1.56 02/15/2023    TSH 2.59 02/10/2022    TSH 2.11 02/10/2021     Lab Results   Component Value Date    MICROL 9 09/23/2022    MICROL 8 11/10/2021    MICROL 8 10/14/2020    MICROL 12  01/24/2020    MICROL <5 12/24/2018     Lab Results   Component Value Date    A1C 5.7 (H) 02/15/2023    A1C 6.4 (H) 09/23/2022    A1C 6.3 (H) 02/10/2022    A1C 6.0 (H) 08/19/2021    A1C 6.5 (H) 02/10/2021       Lab Results   Component Value Date    HGB 15.3 05/05/2023

## 2023-12-11 NOTE — PATIENT INSTRUCTIONS
[facility]-Department of Endocrinology  Diabetes Educators:   Carli Rajput, RN and Stacey Laguna RN  Clinic Nurse: ESTEFANI Art  CMA's: Radha Mccormack and Matt   EMT: Shady  Scheduling/Clinic phone number : [phone]   Clinic Fax: [phone]  On-Call Endocrine at the [facility] (after hours/weekends): [phone] option 4    Check labs soon before 9:30am fasting    Please call the number below to schedule your labs.  [facility] [phone]   [facility] [phone]   [place] [phone]   [facility]  [phone]   [facility] [phone]   [place] [phone]   [facility]) [phone]   [facility] Walk[facility] Only   [place] [phone]   [place] [phone]   [place] [phone]   [place] [phone]     Please reach out to the following centers to schedule your imaging appointment:  Imaging (DEXA, CT, MRI, XRAY)    [facility] ([facility], [facility], [place]) [phone]   [facility] ([place], [place]) [phone]   [facility] ([facility]) [phone]   [facility] ([facility]) [phone]     Appointment Reminders:  * Please bring meter with for staff to download  * If you are due ONLY for an A1C, it is scheduled with the nurse and will be done in clinic. You do not need to schedule a lab appointment. Fasting is not required for an A1C.  * Refill request should be submitted to your pharmacy. They will contact clinic for approval.

## 2023-12-11 NOTE — NURSING NOTE
Joel Pineda's goals for this visit include:   Chief Complaint   Patient presents with    Follow Up    Endocrine Problem     Low testosterone    Diabetes     He requests these members of his care team be copied on today's visit information: Yes     PCP: Ksenia Lyles    Referring Provider:  No referring provider defined for this encounter.    /70 (BP Location: Left arm, Patient Position: Sitting, Cuff Size: Adult Large)   Pulse 85   Wt 128.4 kg (283 lb)   SpO2 97%   BMI 34.00 kg/m      Do you need any medication refills at today's visit? Yes    Radha Estevez CMA  Adult Endocrinology   [facility]

## 2023-12-11 NOTE — LETTER
12/11/2023         RE: Joel Pineda  [address] [facility] [place] [address]        Dear Colleague,    Thank you for referring your patient, Joel Pineda, to the [facility]. Please see a copy of my visit note below.          Endocrinology and Diabetes Clinic             Follow up for hypothyroidism, obesity, low testosterone and T2DM      Assessment:  Middle-aged man with morbid obesity, type 2 diabetes, hypothyroidism and co-morbidity of dyslipidemia POTS chronic pain syndrome on chronic pain medications, bipolar disorder    Obesity patient had been on Ozempic 1 mg weekly, he tolerates this well without side effects.  He notes good effect with his portion size control.  He has lost 35 pounds since summer 2022 and continues to lose weight.    Type 2 diabetes patient is doing very well on Ozempic.  A1c in the normal range, diabetes is in remission     Peripheral neuropathy this seems to be increasing and right foot also affecting remainder of his right leg, more so than on the left, scheduled to see neurosurgery    Low testosterone testosterone has been low twice in 2022, patient since has been able to move more than 10% of his body weight and Reglan which does seem to have caused hyperprolactinemia was discontinued.  Clinically however patient still has symptoms of low testosterone including very low libido and erectile dysfunction.  Reviewed check labs again, reviewed potential treatment options if indicated, patient is interested in injections.  Adverse effects including worsening of sleep apnea, hypogonadism, growth stimulation of ocular testicular cancer were reviewed.    Hypothyroidism doing well on levothyroxine 75 mcg daily      Plan:   Continue levothyroxine 75 mcg daily  Continue Ozempic to 1 mg weekly    Recheck labs for Prolactin, TSH, testosterone, LH,     This note was generated using computer recognized voice recognition. This might result in some expected  imperfection.    Abbie Cuenca MD  Endocrinology and Diabetes  Telephone contact:  [facility] [place] [phone]  [facility] [place] [phone]        Interval history  Pt had testosterone checked twice since last visit, boht were drawn in the morning and low    Pt stopped Reglan due to elevated Prolactin in 9/23;    Has been on Ozempic 1 mg weekly, started treatment in 2022  Initial problems GI related, doing ok with gastroparesis,     Has been on LT4 75 mcg. Takes this      Testosterone:   Low libido decrease in sex drive, years  Erectile dysfunction no med  Has never been on testosterone before  Sleep: snoring CPAP,     Urinary symptoms: urinary frequency no, dysuria no, noturia once a nigth  Head trauma no ECT  Testicular trauma no  Drug use oxcontin  Etoh use no  Depression yes    FH of prostate Ca no    History of neurologic disorder (spinal cord / brain injury, Parkinson, MS, stroke, major surgery) spinal surgery, foraminal narrowin  Vascular (HTN, DM, dyslipidemia, smoking, radiotherapy): yes   Medication: (Antihypertensives, antidepressants, antipsychotics, antiandrogens) yes multiple    Prior medications  Intolerant to metformin due to GI side effects  Intolerance to sulfonylureas due to hypoglycemia    Family history of type 2 diabetes in his mother      Current Problem List:   Patient Active Problem List   Diagnosis     Intermittent asthma     Chronic nonallergic rhinitis     Diverticulosis     GERD (gastroesophageal reflux disease)     Generalized anxiety disorder     LLOYD (obstructive sleep apnea)- mild (AHI 11)     Intracranial arachnoid cyst     Facet arthritis of cervical region     Acquired hypothyroidism     Chronic midline low back pain without sciatica     Irritable bowel syndrome with diarrhea     B12 deficiency     Essential hypertension with goal blood pressure less than 140/90     Chronic, continuous use of opioids     Ankylosing spondylitis of  sacral region (H)     Morbid obesity (H)     Fatty infiltration of liver     DDD (degenerative disc disease), lumbar     Type 2 diabetes mellitus with complication, without long-term current use of insulin (H)     Peripheral polyneuropathy     History of pulmonary embolism     Ingrown toenail     Hypertriglyceridemia     Gastroparesis     Orthostatic dizziness     POTS (postural orthostatic tachycardia syndrome)     PHN (postherpetic neuralgia)     Dysautonomia (H)     Chronic pain syndrome     Borderline personality disorder (H)     MDD (major depressive disorder), recurrent severe, without psychosis (H)     Folliculitis     Immunosuppression (H24)     Small fiber neuropathy     RBD (REM behavioral disorder)     Hyperlipidemia LDL goal <70     Anal fissure     Anorectal disorder     Benign neoplasm of ascending colon     Benign neoplasm of cecum     Altered bowel function     Digestive symptom     Dysphagia     Generalized abdominal pain     History of colitis     Internal hemorrhoids     Nausea     Perianal abscess     Hemorrhage of rectum and anus     Therapeutic opioid induced constipation       Past Medical and Past Surgical History:  Past Medical History:   Diagnosis Date     Acne      Acquired hypothyroidism      Allergic state      Ankylosing spondylitis lumbar region (H)      Ankylosing spondylitis of sacral region (H)      Anxiety      Bipolar 2 disorder (H)      Chest pain     Chest pain, regulated w/BP meds. Clear arteries.     Chronic pain      DDD (degenerative disc disease), lumbar      Depressive disorder      Diabetes (H)      Diverticulosis      Facet arthritis of cervical region      Gastroesophageal reflux disease      Hypertension      IBS (irritable bowel syndrome)      Intracranial arachnoid cyst      Major depressive disorder, recurrent episode (H24)     Multiple psych providers - they manage meds August 2015: Provigil induced severe mood dis-function      Major depressive disorder, recurrent  episode, severe (H) 12/2/2020     MDD (major depressive disorder), recurrent severe, without psychosis (H) 7/21/2021     Mixed dyslipidemia 4/6/2023     LLOYD (obstructive sleep apnea)- mild (AHI 11)      Polyneuropathy      Pulmonary embolism (H)      Skin exam, screening for cancer 12/3/2013     Sleep apnea      Uncomplicated asthma        Past Surgical History:   Procedure Laterality Date     BACK SURGERY  10/2007    lumbar discectomy L5-S1     BIOPSY  09/15/2020    Colon Adenomas x2     COLONOSCOPY      Note: colonoscopy scheduled with [facility] on Friday, 9/4/15     COSMETIC SURGERY  2012    Nose Exterior - functional     GI SURGERY  08/2013    Sigmoidectomy     HERNIA REPAIR, UMBILICAL  08/23/2011    small, Dr. Evan Beavers     INCISION AND DRAINAGE, ABSCESS, COMPLEX  08/23/2011    umbilical, Dr. Evan Beavers     LAPAROSCOPIC ASSISTED COLECTOMY LEFT (DESCENDING)  08/15/2013    Procedure: LAPAROSCOPIC ASSISTED COLECTOMY LEFT (DESCENDING);  Laparoscopic Hand Assisted Sigmoid Resection, Mobilization of Splenic Fissure, coloproctoscopy, *Latex Free Room* Anesthesia General with Pain block  ;  Surgeon: Aurora Justice MD;  Location: [facility] OR     NERVE SURGERY  08/18/2011    RF ablation @ L3-S1 @ [facility]     RECONSTRUCT NOSE AND SEPTUM (FUNCTIONAL)  10/14/2011    Procedure:RECONSTRUCT NOSE AND SEPTUM (FUNCTIONAL); Functional Septorhinoplasty, Turbinate Reduction, ; Surgeon:CEDRIC CUEVAS; Location:[facility] OR     SINUS SURGERY  10/01/2001    ethmoidectomy chronic sinusitis     SOFT TISSUE SURGERY  4/7/2022    Hidradenitis Suppurativa surgery       Medications:       Current Outpatient Medications   Medication Sig Dispense Refill     acetaminophen 500 MG CAPS Take 1,000 mg by mouth 3 times daily 60 capsule      albuterol (PROAIR HFA/PROVENTIL HFA/VENTOLIN HFA) 108 (90 Base) MCG/ACT inhaler INHALE 2 PUFFS BY MOUTH EVERY 4 HOURS AS NEEDED FOR SHORTNESS OF BREATH, DYSPNEA, OR WHEEZING 25.5 g 3     aspirin (ASA) 81 MG tablet Take 81  "mg by mouth daily        B-D LUER-LUCILLE SYRINGE 25G X 1\" 3 ML MISC USE WITH B12 INJECTIONS 12 each 0     buPROPion (WELLBUTRIN XL) 300 MG 24 hr tablet Take 1 tablet by mouth daily       cholecalciferol (D3-50) 1250 mcg (47030 units) capsule TAKE ONE CAPSULE BY MOUTH EVERY 2 WEEKS 24 capsule 0     clonazePAM (KLONOPIN) 0.5 MG tablet Take 1 mg by mouth At Bedtime For RBD       cyanocobalamin (CYANOCOBALAMIN) 1000 MCG/ML injection INJECT 1 ML INTO THE MUSCLE EVERY 30 DAYS 10 mL 0     diphenhydrAMINE (BENADRYL) 25 MG capsule Take 25 mg by mouth once a week Before Enbrel injection       docusate sodium (COLACE) 50 MG capsule Take 100 mg by mouth 2 times daily       droNABinol (MARINOL) 5 MG capsule Take 5 mg by mouth       droNABinol (MARINOL) 5 MG capsule Take 5 mg by mouth 2 times daily as needed       DULoxetine (CYMBALTA) 60 MG capsule Take 120 mg by mouth daily        EPINEPHrine (ANY BX GENERIC EQUIV) 0.3 MG/0.3ML injection 2-pack INJECT 0.3ML (0.3MG) INTO THE MUSCLE ONCE AS NEEDED FOR ANAPHYLAXIS 2 each 0     eszopiclone (LUNESTA) 3 MG tablet Take 3 mg by mouth At Bedtime        etanercept (ENBREL SURECLICK) 50 MG/ML autoinjector Inject 50 mg Subcutaneous once a week . Hold for signs of infection, and seek medical attention. 4 mL 7     famotidine (PEPCID) 20 MG tablet Take 20 mg by mouth daily       fenofibrate (TRICOR) 48 MG tablet TAKE ONE TABLET BY MOUTH ONCE DAILY 90 tablet 0     fexofenadine (ALLEGRA) 180 MG tablet Take 180 mg by mouth daily       fluticasone-salmeterol (ADVAIR HFA) 45-21 MCG/ACT inhaler INHALE TWO PUFFS BY MOUTH TWICE DAILY 36 g 3     levothyroxine (SYNTHROID/LEVOTHROID) 75 MCG tablet TAKE ONE TABLET BY MOUTH EVERY MORNING 90 tablet 1     lidocaine (XYLOCAINE) 5 % external ointment Apply topically 2 times daily as needed for moderate pain 50 g 3     melatonin 3 MG tablet Take 13 mg by mouth nightly as needed        methocarbamol (ROBAXIN) 500 MG tablet TAKE 1 - 2 TABLETS BY MOUTH 4 TIMES DAILY " AS NEEDED FOR MUSCLE SPASMS 240 tablet 1     metoprolol succinate ER (TOPROL XL) 100 MG 24 hr tablet 100 mg in the evening, to be used with 200 mg in the morning. 90 tablet 0     metoprolol succinate ER (TOPROL XL) 200 MG 24 hr tablet Take 1 tablet (200 mg) by mouth 2 times daily 180 tablet 1     montelukast (SINGULAIR) 10 MG tablet TAKE ONE TABLET BY MOUTH EVERY EVENING 90 tablet 3     nabumetone (RELAFEN) 500 MG tablet Take 1-2 tablets (500-1,000 mg) by mouth 2 times daily as needed for moderate pain 120 tablet 1     naloxegol (MOVANTIK) 12.5 MG TABS tablet Take 12.5 mg by mouth every morning (before breakfast)       naloxone (NARCAN) 4 MG/0.1ML nasal spray Spray 1 spray (4 mg) into one nostril alternating nostrils once as needed for opioid reversal every 2-3 minutes until assistance arrives 0.2 mL 0     olopatadine (PATADAY) 0.2 % ophthalmic solution Place 1 drop into both eyes daily 2.5 mL 3     omega-3 acid ethyl esters (LOVAZA) 1 g capsule TAKE TWO CAPSULES BY MOUTH TWICE DAILY 360 capsule 2     omeprazole (PRILOSEC) 20 MG DR capsule Take 20 mg by mouth as needed       ondansetron (ZOFRAN ODT) 8 MG ODT tab Take 1 tablet (8 mg) by mouth every 8 hours as needed for nausea 20 tablet 0     oxyCODONE (ROXICODONE) 5 MG tablet Take 1 tablet (5 mg) by mouth 3 times daily as needed for pain Ok to fill 12/27/23 to start 12/29/23. 30 day supply for chronic pain. 60 tablet 0     oxyCODONE (ROXICODONE) 5 MG tablet Take 1 tablet (5 mg) by mouth every 12 hours as needed for severe pain 6 tablet 0     pregabalin (LYRICA) 150 MG capsule Take 1 capsule (150 mg) by mouth 3 times daily 270 capsule 1     pseudoePHEDrine-guaiFENesin (MUCINEX D)  MG 12 hr tablet Take 1 tablet by mouth every 12 hours       pyridostigmine (MESTINON) 60 MG tablet Take 1 tablet by mouth 3 times daily       ramipril (ALTACE) 10 MG capsule TAKE ONE CAPSULE BY MOUTH ONCE DAILY 90 capsule 1     risperiDONE (RISPERDAL) 0.5 MG tablet Take 0.5 mg by  mouth 2 times daily as needed       rizatriptan (MAXALT) 10 MG tablet Take 1 tablet (10 mg) by mouth at onset of headache for migraine May repeat in 2 hours. Max 3 tablets/24 hours. 30 tablet 1     rosuvastatin (CRESTOR) 40 MG tablet Take 1 tablet (40 mg) by mouth daily 90 tablet 1     Semaglutide, 1 MG/DOSE, 4 MG/3ML SOPN Inject 1 mg Subcutaneous once a week 3 mL 11     sodium fluoride 1.1 % CREA At Bedtime       spacer (OPTICHAMBER JOHNNY) holding chamber To be used in conjunction with albuterol (PROAIR HFA/PROVENTIL HFA/VENTOLIN HFA) 108 (90 Base) MCG/ACT inhaler 1 each 0     valACYclovir (VALTREX) 500 MG tablet Take 1 tablet (500 mg) by mouth daily 90 tablet 3     vitamin B complex with vitamin C (STRESS TAB) tablet Take 1 tablet by mouth daily       ZINC SULFATE-VITAMIN C MT Take 1 tablet by mouth daily         Allergies:   Allergies   Allergen Reactions     Amoxicillin-Pot Clavulanate Difficulty breathing     Banana Shortness Of Breath     Pt reports organic Banana is okay.      Nitroglycerin Palpitations     Penicillins Anaphylaxis     Provigil [Modafinil] Shortness Of Breath     headache     Gadolinium Hives and Itching     Patient was premedicated for the contrast allergy. He did still have a reaction a few hours after injection. Hives and itching. Dr. Gomez told tech to inform pt he should only have contrast again in the future when premedicated and at a hospital. Not at an outpatient facility.      Influenza Virus Vaccine Rash     Quadrivalent, cell based     Ketoconazole      Topical cream caused swelling and itching     Dye [Contrast Dye] Other (See Comments) and Hives     Moderate flushing, CT contrast     Gabapentin      Other reaction(s): hives     Golimumab      Hives, bradycardia, face swelling     Naproxen      Other reaction(s): Bleeding Gums     Neurontin [Gabapentin] Hives     Moderate hives     Nortriptyline Hives     Nystatin Hives     Varicella Virus Vaccine Live      Rash      "Adhesive Tape Rash     Baclofen Other (See Comments)     Cognitive changes     Flagyl [Metronidazole Hcl] Palpitations and Hives     Latex Rash     Metronidazole Palpitations, Other (See Comments) and Rash     dizziness (versus ciprofloxacin taken at same time)       Social History     Tobacco Use     Smoking status: Never     Smokeless tobacco: Never   Substance Use Topics     Alcohol use: Not Currently     Comment: occ 1/week       Family History   Problem Relation Age of Onset     Musculoskeletal Disorder Mother         back     Anxiety Disorder Mother      Colon Polyps Mother      Ulcerative Colitis Mother         and ischemic small intestine, surgery     Hypertension Mother      Breast Cancer Mother      Osteoporosis Mother      Diabetes Mother         Type 2, Diagnosed in 2014     Depression Mother         Takes Cymbalta to help with chronic pain + depx     Thyroid Disease Mother         Hypothyroidism     Obesity Mother         Under much better control latter half of 2015     Musculoskeletal Disorder Father         back     Substance Abuse Father         Alcohol     Hypertension Father      Hyperlipidemia Father      Depression Father         Off meds for many years. Seems \"ok\"     Anxiety Disorder Father      Heart Disease Maternal Grandmother      Heart Disease Maternal Grandfather      Psychotic Disorder Paternal Grandfather      Suicide Paternal Grandfather      Depression Paternal Grandfather         Pediatrician. Committed suicide by pistol in 1990.     Musculoskeletal Disorder Brother         back     Depression Brother         Expressed as anger and moodiness     Substance Abuse Brother         Alcohol     Anxiety Disorder Brother      Substance Abuse Sister         Alcohol     Depression Sister         Mental Health Therapist, yet no anti-depressants?     Anxiety Disorder Sister         Mental Health Therapist, yet no anti-anxiety meds?     Other Cancer Other         Bladder     Substance Abuse " Brother      Colon Cancer No family hx of      Crohn's Disease No family hx of      Anesthesia Reaction No family hx of      Cancer No family hx of         No family history of skin cancer       Physical Examination:  /70 (BP Location: Left arm, Patient Position: Sitting, Cuff Size: Adult Large)   Pulse 85   Wt 128.4 kg (283 lb)   SpO2 97%   BMI 34.00 kg/m      Wt Readings from Last 4 Encounters:   12/11/23 128.4 kg (283 lb)   11/30/23 129.7 kg (286 lb)   09/14/23 132 kg (291 lb)   09/13/23 131.1 kg (289 lb)       Wt Readings from Last 4 Encounters:   11/30/23 129.7 kg (286 lb)   09/14/23 132 kg (291 lb)   09/13/23 131.1 kg (289 lb)   06/14/23 134.2 kg (295 lb 14.4 oz)     10/19/22 : 140.6 kg (310 lb)  08/10/22 : 142.9 kg (315 lb)  07/11/22 : 143.8 kg (317 lb)  06/08/22 : 144.6 kg (318 lb 12.8 oz)      General: Well appearing tall obese man  in no distress,  Eyes: EOMI. Sclerae and conjunctivae are clear.     A1c today 5.3%     Labs and Studies:     Lab Results   Component Value Date     05/05/2023    CO2 22 05/05/2023    CHLORIDE 105 05/05/2023    CR 1.01 05/05/2023    TRIG 277 (H) 02/15/2023    HDL 34 (L) 02/15/2023    HGB 15.3 05/05/2023     Lab Results   Component Value Date    ALT 25 05/05/2023    AST 31 05/05/2023    BILITOTAL 0.2 05/05/2023    CR 1.01 05/05/2023     05/05/2023    TSH 1.56 02/15/2023    ALKPHOS 70 05/05/2023    TESTOSTTOTAL 194 (L) 08/03/2022    TESTOSTTOTAL 203 (L) 07/25/2022    TESTOSTTOTAL 354 03/19/2013    SHBGT 17 08/03/2022    SHBGT 15 07/25/2022    FT 5.12 08/03/2022    FT 5.64 07/25/2022    PSA 0.33 09/28/2015    TRIG 277 (H) 02/15/2023    TRIG 367 (H) 04/21/2022    TRIG 545 (H) 02/10/2022    LDL 44 02/15/2023    HDL 34 (L) 02/15/2023    HDL 38 (L) 04/21/2022    HDL 29 (L) 02/10/2022    PROLACTIN 25 (H) 01/19/2012     Lab Results   Component Value Date     05/05/2023    CHLORIDE 105 05/05/2023    CO2 22 05/05/2023    GLC 96 05/05/2023    CR 1.01 05/05/2023     CR 0.94 02/15/2023    CR 0.98 10/10/2022    CR 0.95 02/10/2022    CR 0.74 11/10/2021    ALYCE 10.1 (H) 05/05/2023    MAG 2.1 08/19/2013    ALBUMIN 4.7 05/05/2023    ALKPHOS 70 05/05/2023    LDL 44 02/15/2023    HDL 34 (L) 02/15/2023    TRIG 277 (H) 02/15/2023    TSH 1.56 02/15/2023    TSH 2.59 02/10/2022    TSH 2.11 02/10/2021     Lab Results   Component Value Date    MICROL 9 09/23/2022    MICROL 8 11/10/2021    MICROL 8 10/14/2020    MICROL 12 01/24/2020    MICROL <5 12/24/2018     Lab Results   Component Value Date    A1C 5.7 (H) 02/15/2023    A1C 6.4 (H) 09/23/2022    A1C 6.3 (H) 02/10/2022    A1C 6.0 (H) 08/19/2021    A1C 6.5 (H) 02/10/2021       Lab Results   Component Value Date    HGB 15.3 05/05/2023           Again, thank you for allowing me to participate in the care of your patient.        Sincerely,        Abbie Cuenca MD

## 2023-12-12 ENCOUNTER — VIRTUAL VISIT (OUTPATIENT)
Dept: PALLIATIVE MEDICINE | Facility: CLINIC | Age: 48
End: 2023-12-12
Payer: MEDICARE

## 2023-12-12 ENCOUNTER — LAB (OUTPATIENT)
Dept: LAB | Facility: CLINIC | Age: 48
End: 2023-12-12
Payer: MEDICARE

## 2023-12-12 DIAGNOSIS — M45.8 ANKYLOSING SPONDYLITIS OF SACRAL REGION (H): ICD-10-CM

## 2023-12-12 DIAGNOSIS — E78.5 HYPERLIPIDEMIA LDL GOAL <70: ICD-10-CM

## 2023-12-12 DIAGNOSIS — E03.9 ACQUIRED HYPOTHYROIDISM: ICD-10-CM

## 2023-12-12 DIAGNOSIS — Z12.5 PROSTATE CANCER SCREENING: ICD-10-CM

## 2023-12-12 DIAGNOSIS — E11.8 TYPE 2 DIABETES MELLITUS WITH COMPLICATION, WITHOUT LONG-TERM CURRENT USE OF INSULIN (H): ICD-10-CM

## 2023-12-12 DIAGNOSIS — M47.816 LUMBAR FACET ARTHROPATHY: ICD-10-CM

## 2023-12-12 DIAGNOSIS — M51.369 DDD (DEGENERATIVE DISC DISEASE), LUMBAR: ICD-10-CM

## 2023-12-12 DIAGNOSIS — M45.9 ANKYLOSING SPONDYLITIS, UNSPECIFIED SITE OF SPINE (H): ICD-10-CM

## 2023-12-12 DIAGNOSIS — I10 ESSENTIAL HYPERTENSION WITH GOAL BLOOD PRESSURE LESS THAN 140/90: Chronic | ICD-10-CM

## 2023-12-12 DIAGNOSIS — G57.12 MERALGIA PARESTHETICA, LEFT LOWER LIMB: ICD-10-CM

## 2023-12-12 DIAGNOSIS — G89.29 CHRONIC INTRACTABLE PAIN: Primary | ICD-10-CM

## 2023-12-12 DIAGNOSIS — E29.1 HYPOGONADISM MALE: ICD-10-CM

## 2023-12-12 DIAGNOSIS — E23.6: ICD-10-CM

## 2023-12-12 LAB
ALBUMIN SERPL BCG-MCNC: 4.9 G/DL (ref 3.5–5.2)
ALP SERPL-CCNC: 54 U/L (ref 40–150)
ALT SERPL W P-5'-P-CCNC: 13 U/L (ref 0–70)
ANION GAP SERPL CALCULATED.3IONS-SCNC: 11 MMOL/L (ref 7–15)
AST SERPL W P-5'-P-CCNC: 27 U/L (ref 0–45)
BASOPHILS # BLD AUTO: 0.1 10E3/UL (ref 0–0.2)
BASOPHILS NFR BLD AUTO: 1 %
BILIRUB SERPL-MCNC: 0.3 MG/DL
BUN SERPL-MCNC: 10 MG/DL (ref 6–20)
CALCIUM SERPL-MCNC: 9.6 MG/DL (ref 8.6–10)
CHLORIDE SERPL-SCNC: 105 MMOL/L (ref 98–107)
CHOLEST SERPL-MCNC: 141 MG/DL
CREAT SERPL-MCNC: 1.08 MG/DL (ref 0.67–1.17)
CREAT UR-MCNC: 140 MG/DL
CRP SERPL-MCNC: <3 MG/L
DEPRECATED HCO3 PLAS-SCNC: 26 MMOL/L (ref 22–29)
EGFRCR SERPLBLD CKD-EPI 2021: 84 ML/MIN/1.73M2
EOSINOPHIL # BLD AUTO: 0.1 10E3/UL (ref 0–0.7)
EOSINOPHIL NFR BLD AUTO: 2 %
ERYTHROCYTE [DISTWIDTH] IN BLOOD BY AUTOMATED COUNT: 12.5 % (ref 10–15)
ERYTHROCYTE [SEDIMENTATION RATE] IN BLOOD BY WESTERGREN METHOD: 2 MM/HR (ref 0–20)
FASTING STATUS PATIENT QL REPORTED: YES
FSH SERPL IRP2-ACNC: 7.1 MIU/ML (ref 1.5–12.4)
GLUCOSE SERPL-MCNC: 94 MG/DL (ref 70–99)
HCT VFR BLD AUTO: 43.7 % (ref 40–53)
HDLC SERPL-MCNC: 33 MG/DL
HGB BLD-MCNC: 14.5 G/DL (ref 13.3–17.7)
IMM GRANULOCYTES # BLD: 0 10E3/UL
IMM GRANULOCYTES NFR BLD: 0 %
LDLC SERPL CALC-MCNC: 47 MG/DL
LH SERPL-ACNC: 3.3 MIU/ML (ref 1.7–8.6)
LYMPHOCYTES # BLD AUTO: 2.9 10E3/UL (ref 0.8–5.3)
LYMPHOCYTES NFR BLD AUTO: 41 %
MCH RBC QN AUTO: 32.2 PG (ref 26.5–33)
MCHC RBC AUTO-ENTMCNC: 33.2 G/DL (ref 31.5–36.5)
MCV RBC AUTO: 97 FL (ref 78–100)
MICROALBUMIN UR-MCNC: 73 MG/L
MICROALBUMIN/CREAT UR: 52.14 MG/G CR (ref 0–17)
MONOCYTES # BLD AUTO: 0.6 10E3/UL (ref 0–1.3)
MONOCYTES NFR BLD AUTO: 9 %
NEUTROPHILS # BLD AUTO: 3.3 10E3/UL (ref 1.6–8.3)
NEUTROPHILS NFR BLD AUTO: 47 %
NONHDLC SERPL-MCNC: 108 MG/DL
NRBC # BLD AUTO: 0 10E3/UL
NRBC BLD AUTO-RTO: 0 /100
PLATELET # BLD AUTO: 200 10E3/UL (ref 150–450)
POTASSIUM SERPL-SCNC: 4.3 MMOL/L (ref 3.4–5.3)
PROLACTIN SERPL 3RD IS-MCNC: 8 NG/ML (ref 4–15)
PROT SERPL-MCNC: 7.3 G/DL (ref 6.4–8.3)
PSA SERPL DL<=0.01 NG/ML-MCNC: 0.13 NG/ML (ref 0–3.5)
PSA SERPL DL<=0.01 NG/ML-MCNC: 0.13 NG/ML (ref 0–3.5)
RBC # BLD AUTO: 4.5 10E6/UL (ref 4.4–5.9)
SHBG SERPL-SCNC: 23 NMOL/L (ref 11–80)
SODIUM SERPL-SCNC: 142 MMOL/L (ref 135–145)
TRIGL SERPL-MCNC: 305 MG/DL
TSH SERPL DL<=0.005 MIU/L-ACNC: 1.69 UIU/ML (ref 0.3–4.2)
WBC # BLD AUTO: 7 10E3/UL (ref 4–11)

## 2023-12-12 PROCEDURE — 84270 ASSAY OF SEX HORMONE GLOBUL: CPT

## 2023-12-12 PROCEDURE — 82570 ASSAY OF URINE CREATININE: CPT

## 2023-12-12 PROCEDURE — 84443 ASSAY THYROID STIM HORMONE: CPT

## 2023-12-12 PROCEDURE — 84153 ASSAY OF PSA TOTAL: CPT

## 2023-12-12 PROCEDURE — 83001 ASSAY OF GONADOTROPIN (FSH): CPT

## 2023-12-12 PROCEDURE — 82043 UR ALBUMIN QUANTITATIVE: CPT

## 2023-12-12 PROCEDURE — 96158 HLTH BHV IVNTJ INDIV 1ST 30: CPT | Mod: 95 | Performed by: PSYCHOLOGIST

## 2023-12-12 PROCEDURE — 80053 COMPREHEN METABOLIC PANEL: CPT

## 2023-12-12 PROCEDURE — 84146 ASSAY OF PROLACTIN: CPT

## 2023-12-12 PROCEDURE — 96159 HLTH BHV IVNTJ INDIV EA ADDL: CPT | Mod: 95 | Performed by: PSYCHOLOGIST

## 2023-12-12 PROCEDURE — G0103 PSA SCREENING: HCPCS

## 2023-12-12 PROCEDURE — 84403 ASSAY OF TOTAL TESTOSTERONE: CPT

## 2023-12-12 PROCEDURE — 80061 LIPID PANEL: CPT

## 2023-12-12 PROCEDURE — 85652 RBC SED RATE AUTOMATED: CPT

## 2023-12-12 PROCEDURE — 83002 ASSAY OF GONADOTROPIN (LH): CPT

## 2023-12-12 PROCEDURE — 85025 COMPLETE CBC W/AUTO DIFF WBC: CPT

## 2023-12-12 PROCEDURE — 36415 COLL VENOUS BLD VENIPUNCTURE: CPT

## 2023-12-12 PROCEDURE — 86140 C-REACTIVE PROTEIN: CPT

## 2023-12-12 RX ORDER — RAMIPRIL 10 MG/1
CAPSULE ORAL
Qty: 90 CAPSULE | Refills: 0 | Status: SHIPPED | OUTPATIENT
Start: 2023-12-12 | End: 2024-03-11

## 2023-12-12 NOTE — PROGRESS NOTES
Joel Pineda is a 48 year old male who is being evaluated via a billable video visit.      Patient is currently in the [facility]? yes    Patient would like the video invitation sent by: Text to cell phone: [phone]}    Video Start Time: 2:00 PM  Video Stop Time: 2:54 PM    Additional provider notes:     Pain Diagnoses per pain provider:   Chronic intractable pain     DDD (degenerative disc disease), lumbar     Ankylosing spondylitis of sacral region (H)     Peripheral polyneuropathy     Meralgia paresthetica, left lower limb       DATA: During today's visit you reported the following: Your back pain is improved since RFA. Your mood is about the same. Your activity level is unchanged. Your stress level is about the same - acknowledge some anticipatory worry which leads to catastrophic thinking about different situations. Your sleep is unchanged. You reported engaging in self-care for your pain 2-3 times daily.    You identified that you would like to focus on the following or had questions regarding the following issues or concerns, and we discussed the following:   - knee buckled as you went up stairs after RFA, on-call provider didn't believe this was related, have appointment with neurosurgery next week to assess  - aunt has brain tumor, Alzheimer's  - father was in town, frustrated unable to connect on interpersonal level with each other due to various factors  - discussed coping ahead for visit to see father in [place] in February  - update on endocrinologist visit yesterday  - starting to cope ahead for trip to [place]  - recognize you have a tendency to engage in catastrophic thinking  - discussed focusing on coping ahead, continuing to be aware of thinking patterns that are maladaptive and challenge negative self-talk    ASSESSMENT: Tito reports his back pain is mildly improved since RFA, he is worried about his knee however on-call provider did not think this was related to RFA. He has  good insight into when he engages in catastrophic thinking, and seems to be working on continuing to engage in coping ahead and problem solving.    PLAN:   Your next appointment is scheduled for 1/2 at 3:00 PM.  Assignment/Objectives /interventions for next session:   - continue to challenge negative self-talk and cope ahead when you notice you are engaging in catastrophic thinking      We believe regular attendance is key to your success in our program!    Any time you are unable to keep your appointment we ask that you call us at [phone] at least 24 hours in advance to cancel.This will allow us to offer the appointment time to another patient.   Multiple missed appointments may lead to dismissal from the clinic.    Telemedicine Visit: The patient's condition can be safely assessed and treated via synchronous audio and visual telemedicine encounter.      Reason for Telemedicine Visit: Services only offered telehealth    Originating Site (Patient Location): Patient's home    Distant Site (Provider Location): Provider Remote Setting- Home Office    Consent:  The patient/guardian has verbally consented to: the potential risks and benefits of telemedicine (video visit) versus in person care; bill my insurance or make self-payment for services provided; and responsibility for payment of non-covered services.     Mode of Communication:  Video Conference via Celly    As the provider I attest to compliance with applicable laws and regulations related to telemedicine.      Shirley Bartlett PsyD LP  Licensed Psychologist  Outpatient Clinic Therapist  [facility]

## 2023-12-14 LAB
TESTOST FREE SERPL-MCNC: 7.14 NG/DL
TESTOST SERPL-MCNC: 299 NG/DL (ref 240–950)

## 2023-12-19 ENCOUNTER — OFFICE VISIT (OUTPATIENT)
Dept: NEUROSURGERY | Facility: CLINIC | Age: 48
End: 2023-12-19
Payer: MEDICARE

## 2023-12-19 ENCOUNTER — TELEPHONE (OUTPATIENT)
Dept: RHEUMATOLOGY | Facility: CLINIC | Age: 48
End: 2023-12-19

## 2023-12-19 VITALS
HEIGHT: 76 IN | HEART RATE: 62 BPM | SYSTOLIC BLOOD PRESSURE: 128 MMHG | DIASTOLIC BLOOD PRESSURE: 85 MMHG | RESPIRATION RATE: 16 BRPM | BODY MASS INDEX: 34.7 KG/M2 | OXYGEN SATURATION: 97 % | WEIGHT: 285 LBS

## 2023-12-19 DIAGNOSIS — G62.9 PERIPHERAL POLYNEUROPATHY: Primary | ICD-10-CM

## 2023-12-19 PROCEDURE — 99214 OFFICE O/P EST MOD 30 MIN: CPT | Performed by: NEUROLOGICAL SURGERY

## 2023-12-19 ASSESSMENT — PAIN SCALES - GENERAL: PAINLEVEL: MODERATE PAIN (4)

## 2023-12-19 NOTE — LETTER
12/19/2023       RE: Joel Pineda  [address] [place] [address]       Dear Colleague,    Thank you for referring your patient, Joel Pineda, to the [facility]UR[facility] [place] [facility]. Please see a copy of my visit note below.    Dictaphone not working.    Pt well known to the neurourgery service.  His right leg gave out on him.  Felt that the entire leg gave out on him.  Has pain off the midline in the spine.  Pt has a history of osteoarthritis involving his SI joint in the past.  Also of note, patient has a history of diabetes with the related polyneuropathy and ankylosing spondilysis.  His diabetes is currently well control.  On exam, full strength, LT intact.  Girdle for postural hypotension in place.  Gait and station normal.      The patient does not appear to have any change in his spine status.  The location of the pain and the nature of the episode appears to be located at the SI joint.  He has not radicular or low back pain per say.  His complicating history of polyneuropathy and previous rhyzolysis can make diagnosis more difficult.  RTC prn.       Again, thank you for allowing me to participate in the care of your patient.      Sincerely,    Nioxn Gallagher MD

## 2023-12-19 NOTE — PROGRESS NOTES
Dictaphone not working.    Pt well known to the neurourgery service.  His right leg gave out on him.  Felt that the entire leg gave out on him.  Has pain off the midline in the spine.  Pt has a history of osteoarthritis involving his SI joint in the past.  Also of note, patient has a history of diabetes with the related polyneuropathy and ankylosing spondilysis.  His diabetes is currently well control.  On exam, full strength, LT intact.  Girdle for postural hypotension in place.  Gait and station normal.      The patient does not appear to have any change in his spine status.  The location of the pain and the nature of the episode appears to be located at the SI joint.  He has not radicular or low back pain per say.  His complicating history of polyneuropathy and previous rhyzolysis can make diagnosis more difficult.  RTC prn.

## 2023-12-19 NOTE — TELEPHONE ENCOUNTER
Called patient to see if he has initiated renewal with [employer]. Left voicemail.    Emailed prescriber portion for signature.    Thank You,     Emily Solis [facility]  Specialty Pharmacy Clinic Liaison [facility] Specialty  [email or web address]  [email or web address]  Phone: [phone]  Fax: [phone]

## 2023-12-19 NOTE — NURSING NOTE
Chief Complaint   Patient presents with    Follow Up     Here for follow up, confirmed with patient     Maricarmen Baxter

## 2023-12-19 NOTE — TELEPHONE ENCOUNTER
Patient called back and left voicemail.     Patient stated he is seeing Dr. Kaur.     He will send his renewal forms by "[employer] to be faxed to [facility].     Thank You,     Emily Solis [facility]  Specialty Pharmacy Clinic [facility]  [email or web address]  [email or web address]  Phone: [phone]  Fax: [phone]

## 2023-12-19 NOTE — PATIENT INSTRUCTIONS
Follow up with Dr Gallagher as needed  Referral completed for Spine  for a S1 Joint Injection.  You will be called to schedule procedure    Call Neurosurgery Care Coordinator with questions/concerns     Thank you for using Dheere Bolo

## 2023-12-20 ENCOUNTER — TELEPHONE (OUTPATIENT)
Dept: PALLIATIVE MEDICINE | Facility: CLINIC | Age: 48
End: 2023-12-20

## 2023-12-20 DIAGNOSIS — M53.3 SI (SACROILIAC) JOINT DYSFUNCTION: Primary | ICD-10-CM

## 2023-12-20 NOTE — TELEPHONE ENCOUNTER
"Screening Questions for Radiology Injections:    Injection to be done at which interventional clinic site? [facility] [place] [facility] [place]    If choosing [facility] for location, please inform patient:  \"[facility] is a Hospital based clinic. Before your visit, you should check with your insurance about how it covers the charges for facility services in a hospital-based clinic.     Procedure ordered by [facility]    Procedure ordered? Right side S1 joint injection   Transforaminal Cervical SAMINA - Send to [facility]) - No [facility] Site providers perform this procedure    What insurance would patient like us to bill for this procedure? Medicare?medica  IF SCHEDULING IN [place] PAIN OR SPINE PLEASE SCHEDULE AT LEAST 7-10 BUSINESS DAYS OUT SO A PA CAN BE OBTAINED  Worker's comp or MVA (motor vehicle accident) -Any injection DO NOT SCHEDULE and route to Nguyen Chow.    [employer] insurance - For SI joint injections, DO NOT SCHEDULE and route to Lorraine Chavez.     ALL BCBS, Humana and [employer] CIGNA - DO NOT SCHEDULE and route to Lorraine Scott  MEDICA- facet joint injections, route to [facility]    Is patient scheduled at [facility]? no   If YES, route every encounter to [facility] SPINE CENTER CARE NAVIGATION POOL [[phone]]    Is an  needed? No     Patient has a  home? (Review Grid) YES: ok    Any chance of pregnancy? NO   If YES, do NOT schedule and route to RN pool  - Dr. Liz route to Grisel Viera and PM&R Nurse  [[id number]]      Is patient actively being treated for cancer or immunocompromised? No  If YES, do NOT schedule and route to RN pool/ Dr. Liz's Team    Does the patient have a bleeding or clotting disorder? No   If YES, okay to schedule AND route to RN nurse / Dr. Liz's Team   (For any patients with platelet count <100, RN must forward to provider)    Is patient taking any Blood Thinners OR Antiplatelet medication?  No   If hold needed, do NOT schedule, " route to ESTEFANI schroeder/ Dr. Liz's Team  Examples:   Blood Thinners: (Coumadin, Warfarin, Jantoven, Pradaxa, Xarelto, Eliquis, Edoxaban, Enoxaparin, Lovenox, Heparin, Arixtra, Fondaparinux or Fragmin)  Antiplatelet Medications: (Plavix, Brilinta or Effient)     Is patient taking any aspirin products (includes Excedrin and Fiorinal)? Yes - Pt takes 81mg daily; instructed to hold 0 day(s) prior to procedure.    If more than 325mg/day, OK to schedule; Instruct Pt to decrease to less than 325 mg for 7 days AND route to RN pool/ Dr. Liz's Team   For CERVICAL procedures, hold all aspirin products for 6 days.   Tell Pt that if aspirin product is not held for 6 days, the procedure WILL BE cancelled.     Any allergies to contrast dye, iodine, shellfish, or numbing and steroid medications? YES Contrast Dye  If YES, schedule and add allergy information to appointment notes AND route to the RN pool/ Dr. Liz's Team  If SAMINA and Contrast Dye / Iodine Allergy? DO NOT SCHEDULE, route to RN pool/ Dr. Liz's Team  Allergies: Amoxicillin-pot clavulanate, Banana, Nitroglycerin, Penicillins, Provigil [modafinil], Gadolinium, Influenza virus vaccine, Ketoconazole, Dye [contrast dye], Gabapentin, Golimumab, Naproxen, Neurontin [gabapentin], Nortriptyline, Nystatin, Varicella virus vaccine live, Adhesive tape, Baclofen, Flagyl [metronidazole hcl], Latex, and Metronidazole     Does patient have an active infection or treated for one within the past week? No  Is patient currently taking any antibiotics or steroid medications?  No   For patients on chronic, preventative, or prophylactic antibiotics, procedures may be scheduled.   For patients on antibiotics for active or recent infection, schedule 4 days after completed.  For patients on steroid medications, schedule 4 days after completed.     Has the patient had a flu shot or any other vaccinations within the past 7 days? No  If yes, explain that for the vaccine to work best they need  to:     wait 1 week before and 1 week after getting any Vaccine  wait 1 week before and 2 weeks after getting any Covid Vaccine   If patient has concerns about the timing, send to RN pool/ Dr. Liz's Team    Does patient have an MRI/CT?  Not Applicable Include Date and Check Procedure Scheduling Grid to see if required.  Was the MRI/CT done within the last 3 years?  n/a  If no route to RN Pool/ Dr. Gayles Team  If yes, where was the MRI/CT done? N/a   Refer to PACS Transmissions list for approved external locations and route to RN Pool High Priority/ Dr. Gayles Team  If MRI was not done at approved external location do NOT schedule and route to RN pool/ Dr. Gayles Team    If patient has an imaging disc, the injection MAY be scheduled but patient must bring disc to appt or appt will be cancelled.    Is patient able to transfer to a procedure table with minimal or no assistance? Yes   If no, do NOT schedule and route to RN Pool/ Dr. Liz's Team    Procedure Specific Instructions:  If celiac plexus block, informed patient NPO for 6 hours and that it is okay to take medications with sips of water, especially blood pressure medications Not Applicable       If this is for a cervical procedure, informed patient that aspirin needs to be held for 6 days.   Not Applicable    Sedation, If Sedation is ordered for any procedure, patient must be NPO for 6 hours prior to procedure Not Applicable    If IV needed:  Do not schedule procedures requiring IV placement in the first appointment of the day or first appointment after lunch. Do NOT schedule at 0745, 0815 or 1245. ok  Instructed patient to arrive 30 minutes early for IV start if required. (Check Procedure Scheduling Grid)  Not Applicable    Reminders:  If you are started on any steroids or antibiotics between now and your appointment, you must contact us because the procedure may need to be cancelled.  Yes    As a reminder, receiving steroids can decrease your  body's ability to fight infection.   Would you still like to move forward with scheduling the injection?  Yes    IV Sedation is not provided for procedures. If oral anti-anxiety medication is needed, the patient should request this from their referring provider.    Instruct patient to arrive as directed prior to the scheduled appointment time:  If IV needed 30 minutes before appointment time     For patients 85 or older we recommend having an adult stay w/ them for the remainder of the day.     If the patient is Diabetic, remind them to bring their glucometer.      Does the patient have any questions?  NO  Ximena Chow  [facility]

## 2023-12-21 ENCOUNTER — MYC MEDICAL ADVICE (OUTPATIENT)
Dept: PALLIATIVE MEDICINE | Facility: OTHER | Age: 48
End: 2023-12-21
Payer: MEDICARE

## 2023-12-21 DIAGNOSIS — M45.8 ANKYLOSING SPONDYLITIS OF SACRAL REGION (H): ICD-10-CM

## 2023-12-21 DIAGNOSIS — M51.369 DDD (DEGENERATIVE DISC DISEASE), LUMBAR: ICD-10-CM

## 2023-12-21 RX ORDER — OXYCODONE HYDROCHLORIDE 5 MG/1
5 TABLET ORAL 3 TIMES DAILY PRN
Qty: 60 TABLET | Refills: 0 | Status: CANCELLED | OUTPATIENT
Start: 2023-12-21

## 2023-12-21 NOTE — TELEPHONE ENCOUNTER
Received call from patient requesting refill(s) of oxyCODONE (ROXICODONE) 5 MG tablet      Prescription is at the pharmacy. Fill date of 12/27/23. [employer] message sent to patient.

## 2023-12-21 NOTE — TELEPHONE ENCOUNTER
Please process a refill of oxyCODONE (ROXICODONE) 5 MG tablet  to     [id number] - [place], [place] - [address]  [address]  [place] [address]  Phone: [phone] Fax: [phone]

## 2023-12-21 NOTE — TELEPHONE ENCOUNTER
1/10/2023 Procedure scheduled Right side S1 joint injection     Patient has previous injections with this clinic.    Patient has contrast dye listed.     Would you like further action taken?     Routing to provider to review and advise.     Farida Rivera RN  [facility] [place]  [phone]

## 2023-12-26 ASSESSMENT — ANXIETY QUESTIONNAIRES
1. FEELING NERVOUS, ANXIOUS, OR ON EDGE: SEVERAL DAYS
2. NOT BEING ABLE TO STOP OR CONTROL WORRYING: SEVERAL DAYS
3. WORRYING TOO MUCH ABOUT DIFFERENT THINGS: SEVERAL DAYS
8. IF YOU CHECKED OFF ANY PROBLEMS, HOW DIFFICULT HAVE THESE MADE IT FOR YOU TO DO YOUR WORK, TAKE CARE OF THINGS AT HOME, OR GET ALONG WITH OTHER PEOPLE?: SOMEWHAT DIFFICULT
7. FEELING AFRAID AS IF SOMETHING AWFUL MIGHT HAPPEN: SEVERAL DAYS
7. FEELING AFRAID AS IF SOMETHING AWFUL MIGHT HAPPEN: SEVERAL DAYS
GAD7 TOTAL SCORE: 7
4. TROUBLE RELAXING: SEVERAL DAYS
GAD7 TOTAL SCORE: 7
5. BEING SO RESTLESS THAT IT IS HARD TO SIT STILL: NOT AT ALL
IF YOU CHECKED OFF ANY PROBLEMS ON THIS QUESTIONNAIRE, HOW DIFFICULT HAVE THESE PROBLEMS MADE IT FOR YOU TO DO YOUR WORK, TAKE CARE OF THINGS AT HOME, OR GET ALONG WITH OTHER PEOPLE: SOMEWHAT DIFFICULT
6. BECOMING EASILY ANNOYED OR IRRITABLE: MORE THAN HALF THE DAYS

## 2024-01-02 ENCOUNTER — VIRTUAL VISIT (OUTPATIENT)
Dept: PALLIATIVE MEDICINE | Facility: CLINIC | Age: 49
End: 2024-01-02
Payer: MEDICARE

## 2024-01-02 DIAGNOSIS — G62.9 PERIPHERAL POLYNEUROPATHY: ICD-10-CM

## 2024-01-02 DIAGNOSIS — M51.369 DDD (DEGENERATIVE DISC DISEASE), LUMBAR: ICD-10-CM

## 2024-01-02 DIAGNOSIS — G57.12 MERALGIA PARESTHETICA, LEFT LOWER LIMB: ICD-10-CM

## 2024-01-02 DIAGNOSIS — M45.8 ANKYLOSING SPONDYLITIS OF SACRAL REGION (H): ICD-10-CM

## 2024-01-02 DIAGNOSIS — G89.29 CHRONIC INTRACTABLE PAIN: Primary | ICD-10-CM

## 2024-01-02 PROCEDURE — 96159 HLTH BHV IVNTJ INDIV EA ADDL: CPT | Mod: 95 | Performed by: PSYCHOLOGIST

## 2024-01-02 PROCEDURE — 96158 HLTH BHV IVNTJ INDIV 1ST 30: CPT | Mod: 95 | Performed by: PSYCHOLOGIST

## 2024-01-02 NOTE — PROGRESS NOTES
Joel Pineda is a 48 year old male who is being evaluated via a billable video visit.      Patient is currently in the [facility]? yes    Patient would like the video invitation sent by: Text to cell phone: [phone]}    Video Start Time: 3:00 PM  Video Stop Time: 3:54 PM    Additional provider notes:     Pain Diagnoses per pain provider:   Chronic intractable pain     DDD (degenerative disc disease), lumbar     Ankylosing spondylitis of sacral region (H)     Peripheral polyneuropathy     Meralgia paresthetica, left lower limb           DATA: During today's visit you reported the following: Your back and leg pain is generally unchanged, some intensification after driving back and forth to mother's for holidays. Your mood is feeling ok. Your activity level is about the same. Your stress level is about the same. Your sleep is 'pretty good' - have reduced overall need to use Risperdal at night. Note sleeping on right side is challenging right now due to SI joint pain. You reported engaging in self-care for your pain 2-3 times daily.    You identified that you would like to focus on the following or had questions regarding the following issues or concerns, and we discussed the following:   - holidays were nice with mother and siblings  - insurance did not approve injection with Dr. Gallagher  - wonder about if you might benefit from prednisone burst - encouraged to reach out to JOCELYN Zavala CNP   - using OTC lidocaine patches 'takes the edge off', using SI joint exercises but infrequently  - explored early warning signs of SI joint pain intensifying  - ongoing stress working with [facility]   - discussed strategies to utilize to reduce likelihood of getting caught up in news cycle  - explored ways to increase opportunities for deanna in life    ASSESSMENT: Tito reports his pain is mildly elevated - unknown trigger. Discussed importance of reaching out to JOCELYN Zavala CNP as she  encouraged him to do so instead of waiting for his next appointment with her in February.     PLAN:   Your next appointment is scheduled for 1/17 at 2:00 PM.  Assignment/Objectives /interventions for next session:   - continue to focus on self-care and self-soothing  - reach out to JOCELYN Zavala CNP to discuss elevated pain level    We believe regular attendance is key to your success in our program!    Any time you are unable to keep your appointment we ask that you call us at [phone] at least 24 hours in advance to cancel.This will allow us to offer the appointment time to another patient.   Multiple missed appointments may lead to dismissal from the clinic.    Telemedicine Visit: The patient's condition can be safely assessed and treated via synchronous audio and visual telemedicine encounter.      Reason for Telemedicine Visit: Services only offered telehealth    Originating Site (Patient Location): Patient's home    Distant Site (Provider Location): Provider Remote Setting- Home Office    Consent:  The patient/guardian has verbally consented to: the potential risks and benefits of telemedicine (video visit) versus in person care; bill my insurance or make self-payment for services provided; and responsibility for payment of non-covered services.     Mode of Communication:  Video Conference via [employer]    As the provider I attest to compliance with applicable laws and regulations related to telemedicine.      Shirley Bartlett PsyD LP  Licensed Psychologist  Outpatient Clinic Therapist  [facility]

## 2024-01-08 ENCOUNTER — MYC MEDICAL ADVICE (OUTPATIENT)
Dept: PULMONOLOGY | Facility: CLINIC | Age: 49
End: 2024-01-08
Payer: MEDICARE

## 2024-01-08 DIAGNOSIS — J45.30 MILD PERSISTENT ASTHMA, UNSPECIFIED WHETHER COMPLICATED: Primary | ICD-10-CM

## 2024-01-08 RX ORDER — FLUTICASONE PROPIONATE AND SALMETEROL 100; 50 UG/1; UG/1
1 POWDER RESPIRATORY (INHALATION) EVERY 12 HOURS
Qty: 3 EACH | Refills: 3 | Status: SHIPPED | OUTPATIENT
Start: 2024-01-08

## 2024-01-15 NOTE — TELEPHONE ENCOUNTER
Prior Authorization Approval    Medication: ENBREL SURECLICK 50 MG/ML SC SOAJ  Authorization Effective Date: 1/15/2024  Authorization Expiration Date: 12/31/2024  Approved Dose/Quantity: weekly  Reference #: [id number]   Insurance Company: [employer] Phone [phone] Fax [phone]  Expected CoPay: $    CoPay Card Available: No    Financial Assistance Needed: yes  Which Pharm[facility] is filling the prescription[facility] [address]  Pharmacy Notified: yes  Patient Notified: yes

## 2024-01-15 NOTE — TELEPHONE ENCOUNTER
PA Initiation    Medication: ENBREL SURECLICK 50 MG/ML SC SOAJ  Insurance Company: [employer] - Phone [phone] Fax [phone]  Pharmacy Filling the Rx: [facility] - [address] [place] [address]  Filling Pharmacy Phone: [phone]  Filling Pharmacy Fax: [phone]  Start Date: 12/19/2023

## 2024-01-15 NOTE — TELEPHONE ENCOUNTER
FREE DRUG APPLICATION INITIATED    Medication: ENBREL SURECLICK 50 MG/ML SC SOAJ  Free Drug Program Name:  [employer]  Date Submitted: 1/15/2024  1:11 PM  Phone: [phone]  Additional Information: sent pt & md form via fax

## 2024-01-16 ENCOUNTER — MYC MEDICAL ADVICE (OUTPATIENT)
Dept: RHEUMATOLOGY | Facility: CLINIC | Age: 49
End: 2024-01-16
Payer: MEDICARE

## 2024-01-16 ENCOUNTER — VIRTUAL VISIT (OUTPATIENT)
Dept: RHEUMATOLOGY | Facility: CLINIC | Age: 49
End: 2024-01-16
Attending: INTERNAL MEDICINE
Payer: COMMERCIAL

## 2024-01-16 DIAGNOSIS — E11.8 TYPE 2 DIABETES MELLITUS WITH COMPLICATION, WITHOUT LONG-TERM CURRENT USE OF INSULIN (H): ICD-10-CM

## 2024-01-16 DIAGNOSIS — M45.9 ANKYLOSING SPONDYLITIS, UNSPECIFIED SITE OF SPINE (H): Primary | ICD-10-CM

## 2024-01-16 DIAGNOSIS — E66.812 OBESITY, CLASS II, BMI 35-39.9: ICD-10-CM

## 2024-01-16 NOTE — Clinical Note
"1/16/2024       RE: Joel Pnieda  [address] [place] [address]     Dear Colleague,    Thank you for referring your patient, Joel Pineda, to the [facility] [place] at [facility]. Please see a copy of my visit note below.    Medication Therapy Management (MTM) Encounter    ASSESSMENT:                            Medication Adherence/Access: {adherencechoices:304900}    ***:  ***      PLAN:                            ***    Follow-up: {followuptest2:816320}    SUBJECTIVE/OBJECTIVE:                          Tito Pineda is a 48 year old male { :504091} for {mtmvisit:066120}     Reason for visit: ***.    Allergies/ADRs: {1/2/3/4/5:401905}  Past Medical History: {1/2/3/4/5:619848}  Tobacco: He reports that he has never smoked. He has never used smokeless tobacco.  Alcohol: {ALCOHOL CONSUMPTION HX:026431}  {Social and Goals:674250}    Medication Adherence/Access: {fumedadherence:896136}    ***:   ***    Enbrel PAP - check fax number  Totally out of Enbrel right now   Due on next on Monday for next dose    Katie Nordisk - received no papers from doctors office    Today's Vitals: There were no vitals taken for this visit.  ----------------  {RUTH?:662465}    I spent {mtm total time 3:215020} with this patient today. { :055106}. A copy of the visit note was provided to the patient's provider(s).    A summary of these recommendations {GIVEN/NOT GIVEN:126340}.    ***    Telemedicine Visit Details  Type of service:  {telemedvisitmtm:854056::\"Telephone visit\"}  Start Time: {video/phone visit start time:152948}  End Time: {video/phone visit end time:152948}     Medication Therapy Recommendations  No medication therapy recommendations to display       Medication Therapy Management (MTM) Encounter    ASSESSMENT:                            Medication Adherence/Access: See below for considerations    Ankylosing Spondylitis: Patient tolerating " and finding good efficacy on Enbrel 50 mg weekly with pre-medications, would benefit from continuing at current dose. Reviewed [employer] PAP renewal difficulties. Rheumatology office has full application on file - will plan to refax to [employer] P[employer] and call to confirm arrival.    Diabetes/Obesity: Would benefit from continuing to follow with endocrinology for management of Ozempic dose. Discussed difficulties with Katie Nordisk PAP in depth today. Will connect with endocrinology team to confirm if provider form was submitted to program yet.    PLAN:                            1. Continue Enbrel 50 mg weekly. I will send in your Enbrel PAP forms again and call to make sure they receive them.    2. I will contact your endocrinology team and find out if the provider forms have been sent to [employer] or not.    -- Provider form refaxed on 1-26-24.    Follow-up: Return in about 6 months (around 7/16/2024) for MTM Pharmacist Visit.    SUBJECTIVE/OBJECTIVE:                          Tito Pineda is a 48 year old male called for a follow-up visit from 9/25/23.       Reason for visit: Enbrel follow up    Allergies/ADRs: Reviewed in chart  Past Medical History: Reviewed in chart  Tobacco: He reports that he has never smoked. He has never used smokeless tobacco.  Alcohol: not currently using    Medication Adherence/Access: Medication barriers: affording medications. Uses patient assistance programs for both Enbrel and Ozempic. Has filled out his forms to renew to both of these programs for 2024 however when he calls to check on the status of his applications, the programs either have said they do not have the forms (Enbrel) or the provider form is missing (Ozempic).    Ankylosing Spondylitis:  Enbrel 50 mg weekly (takes Mondays)     Enbrel pre-medications:  Benadryl 25 mg weekly (30 min prior to injection)  Famotidine 20 mg daily  Allegra 180 mg daily     Reports overall the Enbrel is going well. Has seen significant symptom  improvement after starting on it. Is allergic to latex (rash) so premedicates prior to giving weekly dose to reduce itching. No other side effects noted. Reports he is completely out of medication - was able to complete dose on 1/15/24 but does not have any medication for next week. Typically gets from PAP (see above).     Liver Function Studies -   Recent Labs   Lab Test 12/12/23  0823   PROTTOTAL 7.3   ALBUMIN 4.9   BILITOTAL 0.3   ALKPHOS 54   AST 27   ALT 13     CBC RESULTS:   Recent Labs   Lab Test 12/12/23  0823   WBC 7.0   RBC 4.50   HGB 14.5   HCT 43.7   MCV 97   MCH 32.2   MCHC 33.2   RDW 12.5        Diabetes/Obesity:   Ozempic 1mg weekly    Ozempic is helping with weight loss; thinks he has had about 10% weight loss. Has been tolerating ok with his gastroparesis. Will be out of medication very soon - needs PAP renewed and is still missing provider forms (see above).  SMBG: rarely.    Recent symptoms of low blood sugar? Does set his alarm to remind him to eat.   Recent symptoms of high blood sugar? none  Eye exam: Up to date  Foot exam: Up to date  ACEi/ARB: Yes: Ramipril.    Wt Readings from Last 4 Encounters:   12/19/23 129.3 kg (285 lb)   12/11/23 128.4 kg (283 lb)   11/30/23 129.7 kg (286 lb)   09/14/23 132 kg (291 lb)     12/11/23 A1c POCT: 5.3%    Today's Vitals: There were no vitals taken for this visit.  ----------------    I spent 25 minutes with this patient today. All changes were made via collaborative practice agreement with Frantz Kaur. A copy of the visit note was provided to the patient's provider(s).    A summary of these recommendations was sent via [employer]AB[employer].    Carolynn Pruett, PharmD  Medication Therapy Management Pharmacist  [facility]  Phone: [phone]    Telemedicine Visit Details  Type of service:  Video Conference via [employer]  Start Time: 1:30 PM  End Time: 1:55 PM     Medication Therapy Recommendations  Ankylosing spondylitis of sacral  region (H)    Current Medication: etanercept (ENBREL SURECLICK) 50 MG/ML autoinjector   Rationale: Cannot afford medication product - Cost - Adherence   Recommendation: Provide Adherence Intervention - Resent PAP forms to [employer]   Status: Resolved Med Access Issue                  Again, thank you for allowing me to participate in the care of your patient.      Sincerely,    LATISHA YOUNG RP

## 2024-01-16 NOTE — PROGRESS NOTES
Medication Therapy Management (MTM) Encounter    ASSESSMENT:                            Medication Adherence/Access: See below for considerations    Ankylosing Spondylitis: Patient tolerating and finding good efficacy on Enbrel 50 mg weekly with pre-medications, would benefit from continuing at current dose. Reviewed [employer] PAP renewal difficulties. Rheumatology office has full application on file - will plan to refax to [employer] PAP and call to confirm arrival.    Diabetes/Obesity: Would benefit from continuing to follow with endocrinology for management of Ozempic dose. Discussed difficulties with Katie Nordisk PAP in depth today. Will connect with endocrinology team to confirm if provider form was submitted to program yet.    PLAN:                            1. Continue Enbrel 50 mg weekly. I will send in your Enbrel PAP forms again and call to make sure they receive them.    2. I will contact your endocrinology team and find out if the provider forms have been sent to [employer] or not.    -- Provider form refaxed on 1-26-24.    Follow-up: Return in about 6 months (around 7/16/2024) for MTM Pharmacist Visit.    SUBJECTIVE/OBJECTIVE:                          Tito Pineda is a 48 year old male called for a follow-up visit from 9/25/23.       Reason for visit: Enbrel follow up    Allergies/ADRs: Reviewed in chart  Past Medical History: Reviewed in chart  Tobacco: He reports that he has never smoked. He has never used smokeless tobacco.  Alcohol: not currently using    Medication Adherence/Access: Medication barriers: affording medications. Uses patient assistance programs for both Enbrel and Ozempic. Has filled out his forms to renew to both of these programs for 2024 however when he calls to check on the status of his applications, the programs either have said they do not have the forms (Enbrel) or the provider form is missing (Ozempic).    Ankylosing Spondylitis:  Enbrel 50 mg weekly (takes Mondays)     Enbrel  pre-medications:  Benadryl 25 mg weekly (30 min prior to injection)  Famotidine 20 mg daily  Allegra 180 mg daily     Reports overall the Enbrel is going well. Has seen significant symptom improvement after starting on it. Is allergic to latex (rash) so premedicates prior to giving weekly dose to reduce itching. No other side effects noted. Reports he is completely out of medication - was able to complete dose on 1/15/24 but does not have any medication for next week. Typically gets from PAP (see above).     Liver Function Studies -   Recent Labs   Lab Test 12/12/23  0823   PROTTOTAL 7.3   ALBUMIN 4.9   BILITOTAL 0.3   ALKPHOS 54   AST 27   ALT 13     CBC RESULTS:   Recent Labs   Lab Test 12/12/23  0823   WBC 7.0   RBC 4.50   HGB 14.5   HCT 43.7   MCV 97   MCH 32.2   MCHC 33.2   RDW 12.5        Diabetes/Obesity:   Ozempic 1mg weekly    Ozempic is helping with weight loss; thinks he has had about 10% weight loss. Has been tolerating ok with his gastroparesis. Will be out of medication very soon - needs PAP renewed and is still missing provider forms (see above).  SMBG: rarely.    Recent symptoms of low blood sugar? Does set his alarm to remind him to eat.   Recent symptoms of high blood sugar? none  Eye exam: Up to date  Foot exam: Up to date  ACEi/ARB: Yes: Ramipril.    Wt Readings from Last 4 Encounters:   12/19/23 129.3 kg (285 lb)   12/11/23 128.4 kg (283 lb)   11/30/23 129.7 kg (286 lb)   09/14/23 132 kg (291 lb)     12/11/23 A1c POCT: 5.3%    Today's Vitals: There were no vitals taken for this visit.  ----------------    I spent 25 minutes with this patient today. All changes were made via collaborative practice agreement with Frantz Kaur. A copy of the visit note was provided to the patient's provider(s).    A summary of these recommendations was sent via [employer].    Carolynn Pruett, PharmD  Medication Therapy Management Pharmacist  [facility]  Phone:  [phone]    Telemedicine Visit Details  Type of service:  Video Conference via [employer]  Start Time: 1:30 PM  End Time: 1:55 PM     Medication Therapy Recommendations  Ankylosing spondylitis of sacral region (H)    Current Medication: etanercept (ENBREL SURECLICK) 50 MG/ML autoinjector   Rationale: Cannot afford medication product - Cost - Adherence   Recommendation: Provide Adherence Intervention - Resent PAP forms to [employer]   Status: Resolved Med Access Issue

## 2024-01-17 ENCOUNTER — VIRTUAL VISIT (OUTPATIENT)
Dept: PALLIATIVE MEDICINE | Facility: CLINIC | Age: 49
End: 2024-01-17
Payer: MEDICARE

## 2024-01-17 DIAGNOSIS — M45.8 ANKYLOSING SPONDYLITIS OF SACRAL REGION (H): ICD-10-CM

## 2024-01-17 DIAGNOSIS — M51.369 DDD (DEGENERATIVE DISC DISEASE), LUMBAR: ICD-10-CM

## 2024-01-17 DIAGNOSIS — G62.9 PERIPHERAL POLYNEUROPATHY: ICD-10-CM

## 2024-01-17 DIAGNOSIS — G57.12 MERALGIA PARESTHETICA, LEFT LOWER LIMB: ICD-10-CM

## 2024-01-17 DIAGNOSIS — G89.29 CHRONIC INTRACTABLE PAIN: Primary | ICD-10-CM

## 2024-01-17 PROCEDURE — 96158 HLTH BHV IVNTJ INDIV 1ST 30: CPT | Mod: 95 | Performed by: PSYCHOLOGIST

## 2024-01-17 PROCEDURE — 96159 HLTH BHV IVNTJ INDIV EA ADDL: CPT | Mod: 95 | Performed by: PSYCHOLOGIST

## 2024-01-17 NOTE — PROGRESS NOTES
Joel Pineda is a 48 year old male who is being evaluated via a billable video visit.      Patient is currently in the [facility]? yes    Patient would like the video invitation sent by: Text to cell phone: [phone]}    Video Start Time: 2:00 PM  Video Stop Time: 2:56 PM    Additional provider notes:     Pain Diagnoses per pain provider:   Chronic intractable pain    DDD (degenerative disc disease), lumbar    Ankylosing spondylitis of sacral region (H)    Peripheral polyneuropathy    Meralgia paresthetica, left lower limb        DATA: During today's visit you reported the following: Your chronic pain is unchanged. Your mood is the same - report dissociative episode in last visit when asked if you recalled specifics of prednisone in preparation for rheumatology appointment. Report feeling 'scolded', recognize this was your 'filter' or perception, value your courage in sharing this and reflected that the intent was to problem solve in case that might skew some of the rheumatology testing for inflammatory markers and the like. Your activity level is the same. Your stress level is higher than you'd like - father's health, vocal cord treatment 'hoops', NOAN board responsibilities and dealing with NONA members. Your sleep is unchanged. You reported engaging in self-care for your pain 1-2 times daily.    You identified that you would like to focus on the following or had questions regarding the following issues or concerns, and we discussed the following:   - insurance snafu with billing not being routed correctly to secondary insurance  - father prostate cancer surgery tomorrow - its stage II  - step-father received news his pancreatic cancer is in remission  - feeling lost and overwhelmed with father's cancer treatment - 'brought to home your parents aren't around forever'  - report belief you dissociated when asked about prednisone burst and not recalling when it was last prescribed  - explored what might  be helpful as your provider to help you with when you note you are dissociating  - update on vocal cord treatment - need to complete PT before insurance will cover injections  - ongoing NONA stress    ASSESSMENT: Tito reports his pain is essentially unchanged, and he reports increased stressors recently. He was able to verbalize mistaken perception of being 'scolded' last visit which led to dissociative episode; discussed ways to alert writer to active dissociation and ways to ground him in the moment.     PLAN:   Your next appointment is scheduled for 1/31 at 2:00 PM.  Assignment/Objectives /interventions for next session:   - note strategies you use to self-regulate, soothe and comfort or distract - think DBT skills  - continue to challenge negative self-talk    We believe regular attendance is key to your success in our program!    Any time you are unable to keep your appointment we ask that you call us at [phone] at least 24 hours in advance to cancel.This will allow us to offer the appointment time to another patient.   Multiple missed appointments may lead to dismissal from the clinic.    Telemedicine Visit: The patient's condition can be safely assessed and treated via synchronous audio and visual telemedicine encounter.      Reason for Telemedicine Visit: Services only offered telehealth    Originating Site (Patient Location): Patient's home    Distant Site (Provider Location): Provider Remote Setting- Home Office    Consent:  The patient/guardian has verbally consented to: the potential risks and benefits of telemedicine (video visit) versus in person care; bill my insurance or make self-payment for services provided; and responsibility for payment of non-covered services.     Mode of Communication:  Video Conference via [employer]    As the provider I attest to compliance with applicable laws and regulations related to telemedicine.      Shirley Bartlett PsyD LP  Licensed Psychologist  Outpatient  Clinic Therapist  [facility]

## 2024-01-18 NOTE — PROGRESS NOTES
[facility]    CHIEF COMPLAINT: Chronic Pain.    INTERVAL HISTORY:  Last seen on 11/30/23.       Recommendations/plan at the last visit included:  30 minutes Video or Clinic follow-up with JOCELYN Zavala NP-C in 2-3 months . Ok to schedule up to three follow up appts at a time, alternating clinic and video visits.   Medication Management : December refill of Oxycodone sent to your pharmacy     Since last visit:   - His dad had surgery for prostate cancer, discharged today and so far doing well. Woke up with significant anxiety, had a couple of PVCs that were painful.   - Steroid burst helped provided about 40-50% pain relief  - Would also like TPIs     Pain Information today: Moderate Pain (5)/10. Location of pain: multifocal pain areas. .    Annual Requirements last collected:  6/8/23 UDS, CSA 7/21/23     Current Pain Relevant Medications:    Acetaminophen 500 mg BID & with Oxycodone.   Cymbalta 120 mg in AM  Lyrica 300 MG BID   Methocarbamol 500 mg 1-2 QID PRN  Nabumetone 500 mg 1-2 times a day  Lidocaine gel topically 2-3 times daily  Risperdal 0.5 mg 1-2 x daily      Pain Related Controlled Substances:   Clonazepam 0.5 mg, 2 tabs at HS.  Lunesta 3 mg  Oxycodone 5 mg 1-2 tabs per day PRN              Total opiate dose: 7.5-15 MME     Previous Pain Relevant Medications: (H--helped; HI--Helped initially; SWH--Somewhat helpful; NH--No help; W--worse; SE--side effects; ?--Unsure if helpful)   NOTE: This medication information taken from patient's intake form, not medical records.   Opiates: Oxycodone: H, Tramadol:SW. SE, Codeine: NH  NSAIDS: Celebrex: [facility], Nabumetone:H, Naproxen: SE  Anti-migraine medications: Midrin? , Maxalt:H  Muscle Relaxants: Baclofen:SW, Flexeril:H, Tizaidine:?, Methocarbamol:?  Neuropathics: Lyrica:H  Anti-depressants: Abilify:SE, Brintellix: NH, Wellbutrin: ?, Cymbalta: NH, Nortriptyline: NH, Seroquel:   [facility], Risperdal: NH, Effexor:?, Cymbalta ?  Anxiety  medications: Xanax: H, Klonpin: H, lorazepam: H      Topicals: Lidocaine:H  Sleep medications:Belsomra: NH, Melatonin:H, Trazodone NH, Ambien:NH  Other medications not covered above: Humira: All, Enbrel; H, dicyclomine:H, Medrol:[facility], Prednisone:H     Any illicit drug use: denies   EtOH use: none   Caffeine use: 6-8 per day   Nicotine use: None     Past Pain Treatments:   Pain Clinic:   Yes  [facility] pain management: For spinal injections with Dr Diaz, [facility]: injections, nerve ablation, [facility] for SCS treatment.  Did trial but did not find it beneficial.   [facility] chronic pain program.    PT: Yes  For other reasons. Neck, back and feet  Psychologist: Yes ongoing with private therapist.at  [facility].   Chiropractor: Yes years ag              Acupuncture: Yes NH  Pharmacotherapy:  Opioids: Yes  Non-opioids:    Yes   TENs Unit:Yes H for back   Injections: Yes Many for neck and back over the years.      Surgeries related to pain: Yes   October 2007 L5-S1 laminectomy, micro discectomy          THE 4 As OF OPIOID MAINTENANCE ANALGESIA    Analgesia: Is pain relief clinically significant? YES   Activity: Is patient functional and able to perform Activities of Daily Living? YES   Adverse effects: Is patient free from adverse side effects from opiates? YES   Adherence to Rx protocol: Is patient adhering to Controlled Substance Agreement and taking medications ONLY as ordered? YES     Is Narcan prescribed for opiate use >50 MME daily or concurrent use of opiates and benzodiazepines?  Yes    [place] Board of Pharmacy Data Base Reviewed:    YES; No concern for abuse or misuse of controlled medications based on this report. Reviewed [facility] January 18, 2024- no concerning fills.      PHYSICAL EXAM    Vitals:    01/19/24 1320   BP: (!) 143/83   Pulse: 83       Constitutional: healthy, alert, and no distress, mild anxiety noted A&O.   Patient  appropriate.  Psychiatric/mental status: Alert, without lethargy or stupor.  Appropriate affect.      DIRE Score for ongoing opioid management is calculated as follows:    Diagnosis = 3 pts (advanced condition; severe pain/objective findings)    Intractability = 3 pts (patient fully engaged but inadequate response to treatments)    Risk        Psych = 3 pts (no significant personality dysfunction/mental illness; good communication with clinic)         Chem Hlth = 3 pts (no history of chemical dependency; not drug-focused)       Reliability = 2 pts (occasional difficulties with compliance; generally reliable)       Social = 2 pts (reduction in some relationships/life rolls)       (Psych + Chem hlth + Reliability + Social) = 16    Efficacy = 2 pts (moderate benefit/function; low med dose; too early/not tried meds)    DIRE Score = 18        7-13: likely NOT suitable candidate for long-term opioid analgesia       14-21: may be a suitable candidate for long-term opioid analgesia     Previous Diagnostic Tests:   Imaging Studies:   MR LUMBAR SPINE W/O CONTRAST 6/27/2019  Impression:   1. Multilevel lumbar spondylosis, most pronounced at L5-S1 with  moderate to severe left and moderate right neural foraminal stenosis as well as narrowing of the left lateral recess and abutment the traversing left S1 nerve root.   2. Additional multilevel degenerative changes of the lumbar spine as described above.     PAIN RELEVANT CONDITIONS:   1.  Postherpetic neuralgia  2.  Ankylosing spondylosis, Lumbar DDD with left S1 nerve involvement, lumbar stenosis  3.  Chronic migraine headaches    DIAGNOSIS AND PLAN:     (G89.29) Chronic intractable pain  (primary encounter diagnosis)  (M53.3) SI (sacroiliac) joint dysfunction  (M46.1) Sacroiliitis (H24)  (M79.18) Myofascial muscle pain  (M62.838) Muscle spasm  (Z91.041) Contrast media allergy  (M51.36) DDD (degenerative disc disease), lumbar  (M45.8) Ankylosing spondylitis of sacral region (H)  Comment: Continue current plan of care.   Plan: oxyCODONE (ROXICODONE) 5 MG  tablet  methylPREDNISolone (MEDROL) 32 MG tablet  PAIN INJECTION EVAL/TREAT/FOLLOW UP: Right SI joint injection   PAIN INJECTION EVAL/TREAT/FOLLOW UP: trigger point injections     Hypertension: Joel to follow up with Primary Care provider regarding elevated blood pressure.       PATIENT INSTRUCTIONS:     Diagnosis reviewed, treatment option addressed, and risk/benefits discussed.  Self-care instructions given.  I am recommending a multidisciplinary treatment plan to help this patient better manage pain.    Remember to request ALL medication refills 5 BUSINESS days before you run out.     Health Psychology: YES Continue per Dr Bartlett's recommendations  Physical therapy: NO Continue home exercise program   30 minutes Video or Clinic follow-up with JOCELYN Zavala NP-C in 2 months ot sooner as needed . Ok to schedule up to three follow up appts at a time, alternating clinic and video visits.   Procedures recommended:   Right SI Joint ordered.   Trigger point injection for neck, traps, upper back.  Medication Management :   Prep for contrast allergy:   Take Medrol 32 mg 12 hours before the injection appt time,  Take Medrol 32 mg 2 hours before procedure,    Finally, take Benedryl 50 mg 1 hour prior to procedure  If you have any concerns about how these medications     I have reviewed the note as documented above.  This accurately captures the substance of my conversation with the patient.  A total of 45 minutes of preparation, care, and consultation were spent on this visit today.     JOCELYN Padgett NP-C  [facility]    (Information in italics and blue color are taken from previous pain and consulting medical providers notes and are documented as such)

## 2024-01-19 ENCOUNTER — OFFICE VISIT (OUTPATIENT)
Dept: PALLIATIVE MEDICINE | Facility: OTHER | Age: 49
End: 2024-01-19
Attending: NURSE PRACTITIONER
Payer: MEDICARE

## 2024-01-19 VITALS — HEART RATE: 83 BPM | SYSTOLIC BLOOD PRESSURE: 143 MMHG | DIASTOLIC BLOOD PRESSURE: 83 MMHG

## 2024-01-19 DIAGNOSIS — Z91.041 CONTRAST MEDIA ALLERGY: ICD-10-CM

## 2024-01-19 DIAGNOSIS — M79.18 MYOFASCIAL MUSCLE PAIN: ICD-10-CM

## 2024-01-19 DIAGNOSIS — M62.838 MUSCLE SPASM: ICD-10-CM

## 2024-01-19 DIAGNOSIS — G89.29 CHRONIC INTRACTABLE PAIN: Primary | ICD-10-CM

## 2024-01-19 DIAGNOSIS — M45.8 ANKYLOSING SPONDYLITIS OF SACRAL REGION (H): ICD-10-CM

## 2024-01-19 DIAGNOSIS — M46.1 SACROILIITIS (H): ICD-10-CM

## 2024-01-19 DIAGNOSIS — M53.3 SI (SACROILIAC) JOINT DYSFUNCTION: ICD-10-CM

## 2024-01-19 DIAGNOSIS — M51.369 DDD (DEGENERATIVE DISC DISEASE), LUMBAR: ICD-10-CM

## 2024-01-19 PROCEDURE — G0463 HOSPITAL OUTPT CLINIC VISIT: HCPCS | Performed by: NURSE PRACTITIONER

## 2024-01-19 PROCEDURE — 99215 OFFICE O/P EST HI 40 MIN: CPT | Performed by: NURSE PRACTITIONER

## 2024-01-19 RX ORDER — OXYCODONE HYDROCHLORIDE 5 MG/1
5 TABLET ORAL 3 TIMES DAILY PRN
Qty: 60 TABLET | Refills: 0 | Status: SHIPPED | OUTPATIENT
Start: 2024-01-19 | End: 2024-02-23

## 2024-01-19 RX ORDER — METHYLPREDNISOLONE 32 MG/1
TABLET ORAL
Qty: 2 TABLET | Refills: 0 | Status: SHIPPED | OUTPATIENT
Start: 2024-01-19 | End: 2024-02-23

## 2024-01-19 ASSESSMENT — PAIN SCALES - PAIN ENJOYMENT GENERAL ACTIVITY SCALE (PEG)
INTERFERED_GENERAL_ACTIVITY: 3
AVG_PAIN_PASTWEEK: 3
INTERFERED_ENJOYMENT_LIFE: 3
PEG_TOTALSCORE: 3

## 2024-01-19 ASSESSMENT — PAIN SCALES - GENERAL: PAINLEVEL: MODERATE PAIN (5)

## 2024-01-19 NOTE — PROGRESS NOTES
01/19/24 1324   PEG: A Thee-Item Scale Assessing Pain Intensity and Interference        0 = No pain / No interference    10 = Pain as bad as you can imagine / Completely interferes   What number best describes your pain on average in the past week? 4   What number best describes how, during the past week, pain has interfered with your enjoyment of life? 3   What number best describes how, during the past week, pain has interfered with your general activity? 3   PEG Total Score 3.33

## 2024-01-19 NOTE — NURSING NOTE
9/28/2023     9:03 AM 11/30/2023     1:20 PM 1/19/2024     1:24 PM   PEG Score   PEG Total Score 7.33 6.67 3.33

## 2024-01-21 DIAGNOSIS — I10 ESSENTIAL HYPERTENSION WITH GOAL BLOOD PRESSURE LESS THAN 140/90: ICD-10-CM

## 2024-01-21 DIAGNOSIS — E78.1 HYPERTRIGLYCERIDEMIA: ICD-10-CM

## 2024-01-22 RX ORDER — FENOFIBRATE 48 MG/1
48 TABLET, COATED ORAL DAILY
Qty: 90 TABLET | Refills: 0 | Status: SHIPPED | OUTPATIENT
Start: 2024-01-22 | End: 2024-05-06

## 2024-01-22 RX ORDER — METOPROLOL SUCCINATE 100 MG/1
TABLET, EXTENDED RELEASE ORAL
Qty: 90 TABLET | Refills: 0 | Status: SHIPPED | OUTPATIENT
Start: 2024-01-22 | End: 2024-05-06

## 2024-01-23 ENCOUNTER — TELEPHONE (OUTPATIENT)
Dept: RHEUMATOLOGY | Facility: CLINIC | Age: 49
End: 2024-01-23
Payer: MEDICARE

## 2024-01-23 NOTE — TELEPHONE ENCOUNTER
FREE DRUG APPLICATION APPROVED    Medication: ENBREL SURECLICK 50 MG/ML SC SOAJ  Program Name:  [employer]  Effective Date: 1/23/2024  Expiration Date: 12/31/2024  Pharmacy Filling the Rx: [facility] [place] - [address]  Patient Notified: YES  Additional Information: verified via [employer] rep

## 2024-01-26 ENCOUNTER — TELEPHONE (OUTPATIENT)
Dept: ENDOCRINOLOGY | Facility: CLINIC | Age: 49
End: 2024-01-26
Payer: MEDICARE

## 2024-01-26 NOTE — TELEPHONE ENCOUNTER
FREE DRUG APPLICATION INITIATED    Medication: OZEMPIC (1 MG/DOSE) 4 MG/3ML SC SOPN  Free Drug Program Name:  [employer]  Date Submitted: 1/26/2024  4:48 PM  Phone #: [phone]  Fax #: [phone]  Additional Information: dont see documentatino that this was faxed yet.  Refaxed today

## 2024-01-28 ENCOUNTER — HEALTH MAINTENANCE LETTER (OUTPATIENT)
Age: 49
End: 2024-01-28

## 2024-01-29 ENCOUNTER — E-VISIT (OUTPATIENT)
Dept: FAMILY MEDICINE | Facility: CLINIC | Age: 49
End: 2024-01-29
Payer: MEDICARE

## 2024-01-29 DIAGNOSIS — M25.561 ACUTE PAIN OF RIGHT KNEE: Primary | ICD-10-CM

## 2024-01-29 PROCEDURE — 99421 OL DIG E/M SVC 5-10 MIN: CPT | Performed by: FAMILY MEDICINE

## 2024-01-29 NOTE — PATIENT INSTRUCTIONS
"Recommendations from today's MTM visit:                                                       1. Continue Enbrel 50 mg weekly. I will send in your Enbrel PAP forms again and call to make sure they receive them.    2. I will contact your endocrinology team and find out if the provider forms have been sent to [employer] or not.    Follow-up: Return in about 6 months (around 7/16/2024) for MTM Pharmacist Visit.    It was great speaking with you today.  I value your experience and would be very thankful for your time in providing feedback in our clinic survey. In the next few days, you may receive an email or text message from [employer] with a link to a survey related to your  clinical pharmacist.\"     To schedule another MTM appointment, please call the clinic directly or you may call the MTM scheduling line at [phone].    My Clinical Pharmacist's contact information:                                                      Please feel free to contact me with any questions or concerns you have.      Carolynn Pruett, Carolyn  Medication Therapy Management Pharmacist  [facility]  Phone: [phone]     "

## 2024-01-30 ENCOUNTER — VIRTUAL VISIT (OUTPATIENT)
Dept: PALLIATIVE MEDICINE | Facility: CLINIC | Age: 49
End: 2024-01-30
Payer: MEDICARE

## 2024-01-30 DIAGNOSIS — M51.369 DDD (DEGENERATIVE DISC DISEASE), LUMBAR: ICD-10-CM

## 2024-01-30 DIAGNOSIS — M45.8 ANKYLOSING SPONDYLITIS OF SACRAL REGION (H): ICD-10-CM

## 2024-01-30 DIAGNOSIS — M46.1 SACROILIITIS (H): ICD-10-CM

## 2024-01-30 DIAGNOSIS — M79.18 MYOFASCIAL MUSCLE PAIN: ICD-10-CM

## 2024-01-30 DIAGNOSIS — M53.3 SI (SACROILIAC) JOINT DYSFUNCTION: ICD-10-CM

## 2024-01-30 DIAGNOSIS — M62.838 MUSCLE SPASM: ICD-10-CM

## 2024-01-30 DIAGNOSIS — G89.29 CHRONIC INTRACTABLE PAIN: Primary | ICD-10-CM

## 2024-01-30 PROCEDURE — 96159 HLTH BHV IVNTJ INDIV EA ADDL: CPT | Mod: 95 | Performed by: PSYCHOLOGIST

## 2024-01-30 PROCEDURE — 96158 HLTH BHV IVNTJ INDIV 1ST 30: CPT | Mod: 95 | Performed by: PSYCHOLOGIST

## 2024-01-30 RX ORDER — METHYLPREDNISOLONE 4 MG
TABLET, DOSE PACK ORAL
Qty: 21 TABLET | Refills: 0 | Status: SHIPPED | OUTPATIENT
Start: 2024-01-30 | End: 2024-02-06

## 2024-01-30 NOTE — PROGRESS NOTES
"Joel Pineda is a 48 year old male who is being evaluated via a billable video visit.      Patient is currently in the [facility]? yes    Patient would like the video invitation sent by: Text to cell phone: [phone]}    Video Start Time: 9:00 AM  Video Stop Time: 9:54 AM    Additional provider notes:     Pain Diagnoses per pain provider:   Chronic intractable pain    SI (sacroiliac) joint dysfunction    Sacroiliitis (H24)    Myofascial muscle pain    Muscle spasm    DDD (degenerative disc disease), lumbar    Ankylosing spondylitis of sacral region (H)        DATA: During today's visit you reported the following: Your back pain is worse, SI joint is 'howling' - grateful you will have injection next week. Your mood is mildly improved - feel good after long conversation with father, pleased with outcome on some issues with NONA and property management. Your activity level is unchanged - using PT strengthening and exercises. Your stress level is mildly improved, although still find it is high. Aunt in hospice, NONA board, trip to [place], pain. Your sleep is unchanged, find it can be hard to wind down when stress is high \"my mind is very 'sticky' - its hard to let go [of stress]\". You reported engaging in self-care for your pain 1-3 times daily.    You identified that you would like to focus on the following or had questions regarding the following issues or concerns, and we discussed the following:   - worried you needed PA for SI injection - scheduled 2/6, heard from RN that no PA is needed per scheduling  - good conversation with father - 'just talked about life'  - accidentally hit a nerve in leg with Embrel injectable, MD may provide prednisone burst, using lidocaine patches  - ongoing NONA board issues with  - feel satisfied with resolution for foreseeable future  - continuing to use vocal cord exercises, still waiting to start PT treatment  - wonder about pulling out DBT sleep " hygiene protocol - agree this could be helpful, also discussed 'brain dump'  - discussed proactive vs reactive self-care  - anticipating aunt's death - report death is challenging subject for you - discussed allowing yourself to experience emotions and use cope ahead     ASSESSMENT: Tito reports his pain is still quite high, complicated by accidental nerve injury from self-injectable Embrel. He continues to acknowledge the role of stress in his overall emotional and physical well-being. He seems to recognize he would benefit from accessing DBT materials specifically to manage emotional distress.    PLAN:   Your next appointment is scheduled for 2/13 at 2:00 PM.  Assignment/Objectives /interventions for next session:   - check out DBT sleep hygiene protocol  - continue to challenge negative self-talk     We believe regular attendance is key to your success in our program!    Any time you are unable to keep your appointment we ask that you call us at [phone] at least 24 hours in advance to cancel.This will allow us to offer the appointment time to another patient.   Multiple missed appointments may lead to dismissal from the clinic.    Telemedicine Visit: The patient's condition can be safely assessed and treated via synchronous audio and visual telemedicine encounter.      Reason for Telemedicine Visit: Services only offered telehealth    Originating Site (Patient Location): Patient's home    Distant Site (Provider Location): Provider Remote Setting- Home Office    Consent:  The patient/guardian has verbally consented to: the potential risks and benefits of telemedicine (video visit) versus in person care; bill my insurance or make self-payment for services provided; and responsibility for payment of non-covered services.     Mode of Communication:  Video Conference via [employer]    As the provider I attest to compliance with applicable laws and regulations related to telemedicine.      Shirley Bartlett PsyD  [employer]  Licensed Psychologist  Outpatient Clinic Therapist  [facility]

## 2024-02-05 NOTE — PROGRESS NOTES
Medication Therapy Management (MTM) Encounter    ASSESSMENT:                            Medication Adherence/Access: No issues reported    Pain/Constipation: Stable. Plan in place with Pain Clinic.    Diabetes: Stable. meeting A1c goal of less than 7%. Follows with endocrinology.     Asthma: Stable.     Ankylosing Spondylitis: Stable. Plan in place with rheumatology and rheumatology MTM. Will send Enbrel question to rheumatology MTM for any insight.    Mood/Insomnia: Stable. Follows closely with psychiatry.    Hypertension:   Patient would benefit from rechecking blood pressure in clinic.    Hyperlipidemia:   Stable.    Hypothyroidism:   Stable.    GERD:   Stable.    Allergic rhinitis:   Stable.    Supplements:  Stable-In the future could consider simplifying B12 and Vitamin C supplements    POTS:  Stable    Nausea:  Stable    Migraine:  Stable    Suppressive therapy herpes zoster:  Stable    PLAN:                            No medication changes recommended today.    Follow-up: 3 months    SUBJECTIVE/OBJECTIVE:                          Joel Pineda is a 48 year old male called for a follow up visit. He was referred to me from Ksenia Lyles. Follow up from 10/24/2023. First visit of 2024.    Reason for visit: Medication Review.  Wondering what neurological side effects Enbrel has    Allergies/ADRs: Reviewed in chart  Past Medical History: Reviewed in chart  Tobacco: He reports that he has never smoked. He has never used smokeless tobacco.  Alcohol: none  Caffeine: 4-5 cups of coffee/day  Activity: None    Medication Adherence/Access: no issues reported  Patient uses pill box(es).  Patient qualifies for [employer] program for Enbrel (delivers to his house) and Ozempic (ships to [facility] endocrin[facility])    Pain/Constipation:  Oxycodone 5mg can take up to three times daily as needed for pain-takes about 10mg a day, gets 60 tablets  per month  Pregabalin 150mg three times daily-has only been taking twice daily.  Gets more brain fog and fatigue with higher  doses.  Lidocaine 5% ointment twice daily as needed  Methocarbamol 500-1000mg four time daily(usually 1 three time daily)  Nabumetone 500-1000mg twice daily as needed (usually taking 1 twice daily-holding while on prednisone)   Acetaminophen 500mg three times daily  Movantik 25mg before breakfast-this is effective about 50% of the time  Lidocaine 4% patch uses as needed  Docusate 100mg twice daily  Miralax 1 capful as needed-using twice a week    Has been working with Pain Management (provider, therapist)  in [place] and feel this has been helpful. Is having SI injection this afternoon. Is scheduled for trigger point injections later this month.      Diabetes/Obesity:   Ozempic 1mg weekly.  Has been tolerating ok with his gastroparesis.  SMBG: rarely.    Recent symptoms of low blood sugar? Does set his alarm to remind him to eat.   Recent symptoms of high blood sugar? none  Eye exam: has appt scheduled this week  Foot exam: due  ACEi/ARB: Yes: Ramipril.   Aspirin: Yes  Lab Results   Component Value Date    MICROL 73.0 12/12/2023    MICROL 9 09/23/2022    MICROL 8 10/14/2020     POCT A1c was 5.3% in December  Weight loss 320 to 280lbs.  Discussed with endocrinology about increasing Ozempic dose to 2mg but decided to stay at 1mg with his low A1c.    Asthma:   Wixela 100-50mcg 1 puff twice a day (Symbicort, Stiolo and Advair discus caused hoarse voice)  Montelukast (Singulair) once daily  Short-Acting Bronchodilator: Albuterol MDI- using when he goes out into cold air  Triggers include: cold air and pollutants.   Patient reports the following symptoms: None  Is following up with ENT and neurology because of his hoarse voice.   Asthma Action Plan on file: Yes      6/9/2023    10:04 AM 8/3/2023     7:21 PM 9/6/2023     9:00 AM   ACT Total Scores   ACT TOTAL SCORE (Goal Greater than or Equal to 20) 19 20 23   In the past 12 months, how many times did you visit the emergency  "room for your asthma without being admitted to the hospital? 0 0 0   In the past 12 months, how many times were you hospitalized overnight because of your asthma? 0 0 0     Ankylosing Spondylitis:  Enbrel 50mcg once a week.   Diphenhydramine 25mg prior to Enbrel injection and Ozempic injection    Established care with Dr. Kaur  Feels the Enbrel is helping.   Follows with  Rheumatology MTM.    Mood/Insomnia:  Duloxetine 120mg daily  Bupropion XL 300mg daily  Risperidone 0.5mg twice daily as needed-taking almost every day for agitation  Clonazepam 1mg at bedtime for RBD  Lunesta 3mg at bedtime  Melatonin 13mg nightly as needed    \"Plugging  away\" Follows  with Dr. CHENEY at [facility]. Therapy through pain management.  Sleep has been ok. Takes him a long time to wake up. Has tried ADHD meds in the past to help but this just creates anxiety.    Hypertension     Ramipril 10mg daily  Metoprolol XL 200mg in the morning and 100mg in the evening  Patient reports no current medication side effects  Patient does not self-monitor blood pressure.       BP Readings from Last 3 Encounters:   01/19/24 (!) 143/83   12/19/23 128/85   12/11/23 104/70     Pulse Readings from Last 3 Encounters:   01/19/24 83   12/19/23 62   12/11/23 85     Hyperlipidemia     rosuvastatin 40mg daily  Fish Oil 1000mg twice daily  Fenofibrate 48mg once daily-kidney function decreased with higher doses of fenofibrate  Patient reports no significant myalgias or other side effects.  The 10-year ASCVD risk score (Katiana DK, et al., 2019) is: 8.8%    Values used to calculate the score:      Age: 48 years      Sex: Male      Is Non- : No      Diabetic: Yes      Tobacco smoker: No      Systolic Blood Pressure: 143 mmHg      Is BP treated: Yes      HDL Cholesterol: 33 mg/dL      Total Cholesterol: 141 mg/dL       Recent Labs   Lab Test 12/12/23  0823 02/15/23  0833   CHOL 141 133   HDL 33* 34*   LDL 47 44   TRIG 305* 277* "     Hypothyroidism      Levothyroxine 75 mcg daily.   Patient is having the following symptoms: none.      TSH   Date Value Ref Range Status   12/12/2023 1.69 0.30 - 4.20 uIU/mL Final   02/15/2023 1.56 0.40 - 4.00 mU/L Final   02/10/2021 2.11 0.40 - 4.00 mU/L Final     GERD:   Omeprazole 20 mg once daily as needed only on mornings when he takes Ozempic and Enbrel  Famotidine 20 mg once daily   Gaviscon 3 tablets a week  Patient feels that current regimen is effective.    Allergic Rhinitis:   fexofenadine 180 mg once daily  ophthalmic drops - olopatadine 0.2% 1 drop into both eyes once daily-uses maybe once a week  decongestant - Mucinex D 1 tablet  twice daily  Patient feels that current therapy is effective.     Supplements:   Vitamin D 214025 every 2 weeks  Cyanocobalamin 1000mcg into the muscle every 4 weeks  Vitamin B Complex + C daily  Zinc-Vitamin C daily  No reported issues at this time.     POTS:  Pyridostigmine 60mg three times daily    Wears a binder.   Unsure if medication is helpful.   Follows with Dr. Khan at [facility]ne[facility]rology.  Drinking electrolytes  Dizziness upon changing positions.    Nausea:  Ondansetron ODT 8mg every 8 hours as needed  Dronabinol 10mg a week    Comes and goes. Is not related to Ozempic.     Migraine:   Preventive medications  None   Acute medications  Rizatriptan 10 mg at onset of migraine  Triggers include: Unknown  Patient reports having 4 headache days per month Trigger point injections have been helpful    Suppressive therapy Herpes Zoster:  Valcyclovir 500mg daily    Has been controlling his symptoms. Can't remember when his last     Today's Vitals: There were no vitals taken for this visit.  ----------------    I spent 44 minutes with this patient today. All changes were made via collaborative practice agreement with Ksenia Lyles. A copy of the visit note was provided to the patient's provider(s).    A summary of these recommendations was sent via  Kal.    Radha Mcdonald, Pharm.D, BCACP  Medication Therapy Management Pharmacist    Telemedicine Visit Details  Type of service:  Telephone visit  Start Time: 11:02AM  End Time: 11:46AM     Medication Therapy Recommendations  Intermittent asthma    Current Medication: fluticasone-salmeterol (ADVAIR-HFA) 45-21 MCG/ACT inhaler (Discontinued)   Rationale: Undesirable effect - Adverse medication event - Safety   Recommendation: Change Medication - Dulera 100-5 MCG/ACT Aero   Status: Accepted per Provider

## 2024-02-05 NOTE — TELEPHONE ENCOUNTER
"Pt has allergy to contrast dye,  specifically gadolinium and general contrast dye.  Per Dr. Montgomery he will need to be premedicated.    Contrast dye allergy reaction:  flushing  Is premedicating needed? Yes     Injection scheduled for:   Arrive 30\" early for IV.     Called pt.    He says he already has the medications already and that JOCELYN Padgett, NP-C  and team set up the premed schedule already.    See the 1/23/24 visit for more information on premed instructions.    Eloisa RN-BSN  [facility]   [phone]    "

## 2024-02-06 ENCOUNTER — RADIOLOGY INJECTION OFFICE VISIT (OUTPATIENT)
Dept: PALLIATIVE MEDICINE | Facility: CLINIC | Age: 49
End: 2024-02-06
Attending: NURSE PRACTITIONER
Payer: MEDICARE

## 2024-02-06 ENCOUNTER — VIRTUAL VISIT (OUTPATIENT)
Dept: PHARMACY | Facility: CLINIC | Age: 49
End: 2024-02-06
Payer: COMMERCIAL

## 2024-02-06 VITALS — DIASTOLIC BLOOD PRESSURE: 82 MMHG | SYSTOLIC BLOOD PRESSURE: 124 MMHG | HEART RATE: 80 BPM | OXYGEN SATURATION: 96 %

## 2024-02-06 DIAGNOSIS — F41.8 DEPRESSION WITH ANXIETY: ICD-10-CM

## 2024-02-06 DIAGNOSIS — G89.4 CHRONIC PAIN SYNDROME: ICD-10-CM

## 2024-02-06 DIAGNOSIS — M53.3 SI (SACROILIAC) JOINT DYSFUNCTION: ICD-10-CM

## 2024-02-06 DIAGNOSIS — Z78.9 TAKES DIETARY SUPPLEMENTS: ICD-10-CM

## 2024-02-06 DIAGNOSIS — M46.1 SACROILIITIS (H): ICD-10-CM

## 2024-02-06 DIAGNOSIS — G43.819 OTHER MIGRAINE WITHOUT STATUS MIGRAINOSUS, INTRACTABLE: ICD-10-CM

## 2024-02-06 DIAGNOSIS — J45.30 MILD PERSISTENT ASTHMA WITHOUT COMPLICATION: ICD-10-CM

## 2024-02-06 DIAGNOSIS — J30.2 SEASONAL ALLERGIC RHINITIS, UNSPECIFIED TRIGGER: ICD-10-CM

## 2024-02-06 DIAGNOSIS — K59.03 DRUG-INDUCED CONSTIPATION: ICD-10-CM

## 2024-02-06 DIAGNOSIS — R11.0 NAUSEA: ICD-10-CM

## 2024-02-06 DIAGNOSIS — G47.00 INSOMNIA, UNSPECIFIED TYPE: ICD-10-CM

## 2024-02-06 DIAGNOSIS — B02.9 HERPES ZOSTER WITHOUT COMPLICATION: ICD-10-CM

## 2024-02-06 DIAGNOSIS — M45.8 ANKYLOSING SPONDYLITIS OF SACRAL REGION (H): ICD-10-CM

## 2024-02-06 DIAGNOSIS — E11.9 TYPE 2 DIABETES MELLITUS WITHOUT COMPLICATION, WITHOUT LONG-TERM CURRENT USE OF INSULIN (H): Primary | ICD-10-CM

## 2024-02-06 PROCEDURE — 99207 PR NO CHARGE LOS: CPT | Mod: 93 | Performed by: PHARMACIST

## 2024-02-06 PROCEDURE — 27096 INJECT SACROILIAC JOINT: CPT | Mod: 50 | Performed by: PAIN MEDICINE

## 2024-02-06 RX ORDER — NALOXEGOL OXALATE 25 MG/1
25 TABLET, FILM COATED ORAL
COMMUNITY
Start: 2024-01-24 | End: 2024-04-08

## 2024-02-06 RX ADMIN — TRIAMCINOLONE ACETONIDE 40 MG: 40 INJECTION, SUSPENSION INTRA-ARTICULAR; INTRAMUSCULAR at 11:13

## 2024-02-06 ASSESSMENT — PAIN SCALES - GENERAL
PAINLEVEL: NO PAIN (0)
PAINLEVEL: MODERATE PAIN (4)

## 2024-02-06 NOTE — NURSING NOTE
Pre-procedure Intake  If YES to any questions or NO to having a   Please complete laminated checklist and leave on the computer keyboard for Provider, verbally inform provider if able.    For SCS Trial, RFA's or any sedation procedure:  Have you been fasting? NA  If yes, for how long?     Are you taking any any blood thinners such as Coumadin, Warfarin, Jantoven, Pradaxa Xarelto, Eliquis, Edoxaban, Enoxaparin, Lovenox, Heparin, Arixtra, Fondaparinux, or Fragmin? OR Antiplatelet medication such as Plavix, Brilinta, or Effient?   No   If yes, when did you take your last dose?     Do you take aspirin?  Yes   If cervical procedure, have you held aspirin for 6 days?   No     Do you have any allergies to contrast dye, iodine, steroid and/or numbing medications?  Yes, dye contrast     Are you currently taking antibiotics or have an active infection?  NO    Have you had a fever/elevated temperature within the past week? NO    Are you currently taking oral steroids? NO    Do you have a ? Yes    Are you pregnant or breastfeeding?  Not Applicable    Have you received the COVID-19 vaccine? Yes  If yes, was it your 1st, 2nd or only dose needed?   Date of most recent vaccine: 9/21/23    Notify provider and RNs if systolic BP >170, diastolic BP >100, P >100 or O2 sats < 90%

## 2024-02-06 NOTE — NURSING NOTE
Discharge Information    IV Discontiued Time:  1525    Amount of Fluid Infused:  NA    Discharge Criteria = When patient returns to baseline or as per MD order    Consciousness:  Pt is fully awake    Circulation:  BP +/- 20% of pre-procedure level    Respiration:  Patient is able to breathe deeply    O2 Sat:  Patient is able to maintain O2 Sat >92% on room air    Activity:  Moves 4 extremities on command    Ambulation:  Patient is able to stand and walk or stand and pivot into wheelchair    Dressing:  Clean/dry or No Dressing    Notes:   Discharge instructions and AVS given to patient    Patient meets criteria for discharge?  YES    Admitted to PCU?  No    Responsible adult present to accompany patient home?  Yes    Signature/Title:    Farida Rivera RN  RN Care Coordinator  [facility] [place]

## 2024-02-06 NOTE — PATIENT INSTRUCTIONS
"Recommendations from today's MTM visit:                                                         No medication changes recommended today.    Follow-up: 3 months    It was great speaking with you today.  I value your experience and would be very thankful for your time in providing feedback in our clinic survey. In the next few days, you may receive an email or text message from [facility] [employer] with a link to a survey related to your  clinical pharmacist.\"     To schedule another MTM appointment, please call the clinic directly or you may call the MTM scheduling line at [phone] or toll-free at [phone].     My Clinical Pharmacist's contact information:                                                      Please feel free to contact me with any questions or concerns you have.      Radha Mcdonald, Pharm.D, [facility]  Medication Therapy Management Pharmacist      "

## 2024-02-06 NOTE — PATIENT INSTRUCTIONS
[facility]   Procedure Discharge Instructions    Today you saw:  Dr. Bubba Montgomery       You had an:  Sacroiliac joint injection     Be cautious when walking. Numbness and/or weakness in the lower extremities may occur for up to 6-8 hours after the procedure due to effect of the local anesthetic  Do not drive for 6 hours. The effect of the local anesthetic could slow your reflexes.   You may resume your regular activities after 24 hours  Avoid strenuous activity for the first 24 hours  You may shower, however avoid swimming, tub baths or hot tubs for 24 hours following your procedure  You may have a mild to moderate increase in pain for several days following the injection.  It may take up to 14 days for the steroid medication to start working although you may feel the effect as early as a few days after the procedure.     You may use ice packs for 10-15 minutes, 3 to 4 times a day at the injection site for comfort  Do not use heat to painful areas for 6 to 8 hours. This will give the local anesthetic time to wear off and prevent you from accidentally burning your skin.   Unless you have been directed to avoid the use of anti-inflammatory medications (NSAIDS), you may use medications such as ibuprofen, Aleve or Tylenol for pain control if needed.   If you have diabetes, check your blood sugar more frequently than usual as your blood sugar may be higher than normal for 10-14 days following a steroid injection. Contact your doctor who manages your diabetes if your blood sugar is higher than usual  Possible side effects of steroids that you may experience include flushing, elevated blood pressure, increased appetite, mild headaches and restlessness.  All of these symptoms will get better with time.  If you experience any of the following, call the Pain Clinic during work hours (Mon-Friday 8-4:30 pm) at [phone] or the Provider Line after hours at [phone]:  -Fever over 100  degree F  -Swelling, bleeding, redness, drainage, warmth at the injection site  -Progressive weakness or numbness in your legs   -Loss of bowel or bladder function  -Unusual new onset of pain that is not improving

## 2024-02-06 NOTE — NURSING NOTE
22 gauge Peripheral IV inserted into dorsal left wrist by Dr. Montgomery. - attempts: 3.  Hot pack.     Eloisa RN-BSN  [facility]   [phone]

## 2024-02-07 RX ORDER — TRIAMCINOLONE ACETONIDE 40 MG/ML
40 INJECTION, SUSPENSION INTRA-ARTICULAR; INTRAMUSCULAR ONCE
Status: COMPLETED | OUTPATIENT
Start: 2024-02-06 | End: 2024-02-06

## 2024-02-12 NOTE — TELEPHONE ENCOUNTER
Problem: Diabetes  Goal: Achieves glycemic balance  Description: Goal is to maintain blood sugar within range with no episodes of hypoglycemia  Outcome: Monitoring/Evaluating progress  Goal: Verbalizes/demonstrates understanding of NEW diagnosis of diabetes and management  Description: Document on Patient Education Activity  Outcome: Monitoring/Evaluating progress  Goal: Verbalizes understanding of diabetes management including how to use HbA1C to evaluate status of blood sugar over time (Diabetes is NOT a new diagnosis)  Description: Diabetes Education  Outcome: Monitoring/Evaluating progress  Goal: Demonstrates ability to self-administer insulin  Description: Document on Patient Education Activity  Outcome: Monitoring/Evaluating progress     Problem: Angina/Chest Pain  Goal: # Achieves Chest Pain Control (Pain Score = 0-1, no episodes)  Description: Chest pain control = Pain Score = 0-1, no episodes of pain  Outcome: Monitoring/Evaluating progress  Goal: Anxiety is controlled  Outcome: Monitoring/Evaluating progress  Goal: # Verbalizes understanding of symptoms, diagnosis, and treatment  Description: Document on Patient Education Activity  Outcome: Monitoring/Evaluating progress     Problem: Pain  Goal: Acceptable pain level achieved/maintained at rest using appropriate pain scale for the patient  Outcome: Monitoring/Evaluating progress  Goal: Acceptable pain level achieved/maintained with activity using appropriate pain scale for the patient  Outcome: Monitoring/Evaluating progress  Goal: Acceptable pain level achieved/maintained without oversedation  Outcome: Monitoring/Evaluating progress      PREVISIT INFORMATION                                                    Joel Pineda scheduled for future visit at [facility] specialty clinics.    Patient is scheduled to see Kimberly Douglas on 31/12/18  Reason for visit:   Referring provider   Has patient seen previous specialist?   Medical Records:  Available in chart.  Patient was previously seen at a [place] or [facility] facility.    REVIEW                                                      New patient packet mailed to patient: N/A  Medication reconciliation complete: N/A      Current Outpatient Prescriptions   Medication Sig Dispense Refill     Omega-3 Fatty Acids (FISH OIL PO)        ALPRAZolam (XANAX XR) 0.5 MG 24 hr tablet Take 0.5 mg by mouth       hydrOXYzine (ATARAX) 50 MG tablet Take 50 mg by mouth daily       oxyCODONE IR (ROXICODONE) 5 MG tablet Take 1-2 tablets (5-10 mg) by mouth every 6 hours as needed for pain (maximum 4 tablet(s) per day) 35 tablet 0     risperiDONE (RISPERDAL) 1 MG tablet        loperamide (IMODIUM) 2 MG capsule Take 2 mg by mouth 4 times daily as needed for diarrhea       levothyroxine (SYNTHROID/LEVOTHROID) 75 MCG tablet Take 1 tablet (75 mcg) by mouth every morning 90 tablet 1     celecoxib (CELEBREX) 200 MG capsule TAKE 1 CAPSULE BY MOUTH TWICE DAILY AS NEEDED FORMODERATE PAIN 180 capsule 1     ramipril (ALTACE) 5 MG capsule TAKE 1 CAPSULE BY MOUTH DAILY 90 capsule 1     fenofibrate 145 MG tablet Take 1 tablet (145 mg) by mouth daily 90 tablet 1     atorvastatin (LIPITOR) 40 MG tablet TAKE 1 TABLET (40 MG) BY MOUTH DAILY 90 tablet 1     ondansetron (ZOFRAN) 8 MG tablet TAKE 1 TABLET (8 MG) BY MOUTH EVERY 8 HOURS AS NEEDED FOR NAUSEA/VOMITING. 20 tablet 5     ALPRAZolam (XANAX XR) 1 MG 24 hr tablet Take 1 mg by mouth every morning       benztropine (COGENTIN) 1 MG tablet Take 1 mg by mouth 2 times daily Tasking in PM       DULoxetine (CYMBALTA) 30 MG EC capsule Take 90 mg by mouth daily        lamoTRIgine (LAMICTAL) 25 MG tablet Take 100 mg by mouth daily 200mg daily       metoprolol (TOPROL-XL) 200 MG 24 hr tablet Take 200 mg by mouth daily       cyanocobalamin (VITAMIN B12) 1000 MCG/ML injection Inject 1 mL (1,000 mcg) into the muscle every 30 days 10 mL 1     pregabalin (LYRICA) 75 MG capsule Take 1 capsule (75 mg) by mouth 2 times daily 60 capsule 5     vitamin D3 (CHOLECALCIFEROL) 82508 UNITS capsule Take one capsule every two weeks. 24 capsule 1     tiZANidine (ZANAFLEX) 4 MG tablet Take 1 tablet (4 mg) by mouth 3 times daily as needed for muscle spasms 90 tablet 5     albuterol (PROAIR HFA/PROVENTIL HFA/VENTOLIN HFA) 108 (90 BASE) MCG/ACT Inhaler Inhale 2 puffs into the lungs every 4 hours as needed       EPINEPHrine 0.3 MG/0.3ML injection Inject 0.3 mLs (0.3 mg) into the muscle once as needed for anaphylaxis 0.6 mL 3     order for DME Respironics REMSTAR 60 Series Auto CPAP 10-12 cm H2O, Wisp nasal mask w/a large cushion and a chinstrap       lidocaine (XYLOCAINE) 5 % ointment Apply topically 3 times daily as needed for moderate pain Apply as directed 250 g 5     acetaminophen 500 MG CAPS Take 500 mg by mouth every 4 hours as needed 60 capsule      cetirizine (ZYRTEC) 10 MG tablet Take 1 tablet (10 mg) by mouth At Bedtime (Patient taking differently: Take 10 mg by mouth daily ) 30 tablet 11     order for DME SI-loc belt 1 Units 0     order for DME Equipment being ordered: 1 ml tuberculin syringes to be used for Vitamin B12 injections. 12 each 1     omeprazole 20 MG tablet Take 20 mg by mouth daily          Allergies: Banana; Nitroglycerin; Penicillins; Provigil [modafinil]; Ciprofloxacin; Gadolinium; Dulera; Dye [contrast dye]; Levaquin; Levofloxacin; Neurontin [gabapentin]; Nortriptyline; Varicella virus vaccine live; Ciprofloxacin; Flagyl [metronidazole hcl]; Latex; Metronidazole; and No clinical screening - see comments        PLAN/FOLLOW-UP NEEDED                                                       Will route to care team for follow-up. LM for patient to return call to discuss.      Patient Reminders Given:  Please, make sure you bring an updated list of your medications.   If you are having a procedure, please, present 15 minutes early.  If you need to cancel or reschedule,please call [phone].    Sneha Shannon

## 2024-02-13 ENCOUNTER — VIRTUAL VISIT (OUTPATIENT)
Dept: PALLIATIVE MEDICINE | Facility: CLINIC | Age: 49
End: 2024-02-13
Payer: MEDICARE

## 2024-02-13 ENCOUNTER — MYC MEDICAL ADVICE (OUTPATIENT)
Dept: PALLIATIVE MEDICINE | Facility: OTHER | Age: 49
End: 2024-02-13

## 2024-02-13 DIAGNOSIS — M46.1 SACROILIITIS (H): ICD-10-CM

## 2024-02-13 DIAGNOSIS — M62.838 MUSCLE SPASM: ICD-10-CM

## 2024-02-13 DIAGNOSIS — G57.12 MERALGIA PARESTHETICA, LEFT LOWER LIMB: Primary | ICD-10-CM

## 2024-02-13 DIAGNOSIS — M51.369 DDD (DEGENERATIVE DISC DISEASE), LUMBAR: ICD-10-CM

## 2024-02-13 DIAGNOSIS — G57.13 MERALGIA PARESTHETICA OF BOTH LOWER EXTREMITIES: ICD-10-CM

## 2024-02-13 DIAGNOSIS — M53.3 SI (SACROILIAC) JOINT DYSFUNCTION: ICD-10-CM

## 2024-02-13 DIAGNOSIS — G89.29 CHRONIC INTRACTABLE PAIN: Primary | ICD-10-CM

## 2024-02-13 DIAGNOSIS — M79.18 MYOFASCIAL MUSCLE PAIN: ICD-10-CM

## 2024-02-13 DIAGNOSIS — M45.8 ANKYLOSING SPONDYLITIS OF SACRAL REGION (H): ICD-10-CM

## 2024-02-13 PROCEDURE — 96159 HLTH BHV IVNTJ INDIV EA ADDL: CPT | Mod: 95 | Performed by: PSYCHOLOGIST

## 2024-02-13 PROCEDURE — 96158 HLTH BHV IVNTJ INDIV 1ST 30: CPT | Mod: 95 | Performed by: PSYCHOLOGIST

## 2024-02-13 NOTE — PROGRESS NOTES
Joel Pineda is a 48 year old male who is being evaluated via a billable video visit.      Patient is currently in the [facility]? yes    Patient would like the video invitation sent by: Text to cell phone: [phone]}    Video Start Time: 2:00 PM  Video Stop Time: 2:54 PM     Additional provider notes:     Pain Diagnoses per pain provider:   Chronic intractable pain     SI (sacroiliac) joint dysfunction     Sacroiliitis (H24)     Myofascial muscle pain     Muscle spasm     DDD (degenerative disc disease), lumbar     Ankylosing spondylitis of sacral region (H)            DATA: During today's visit you reported the following: Your SI joint pain is mildly improved and less frequent. Other pain is about the same. Your mood is unchanged, wish psychiatrist would change medications - feel depression is worse. 'I've been playing a game to see how long I can go without cleaning myself up'. Worried you will 'pop my top' in [place], wonder about taking Risperdol on schedule vs PRN while in [place]. Your activity level is stable. Your stress level remains higher than you'd like. Your sleep is unchanged - still struggling to fall asleep. You reported engaging in self-care for your pain 1-3 times daily.    You identified that you would like to focus on the following or had questions regarding the following issues or concerns, and we discussed the following:   - heading to [place] this weekend  - update on SI joint injections   - TPI upon return from [place]  - didn't attend step-aunt's birthday out of anxiety of not knowing several extended family members  - attending [facility] with cousins tomorrow  - discussed using [place] [employer] for [place]  - identified DBT strategies to use: wise mind DISTRACTS, self-soothing with the 5 senses (vision, hearing), TIP skill, self-time out/break from socialization with father and his wife  - sleep hygiene skills - no clock watching, get up after 20-30 min and go someplace quiet (no  phone, lights, tablets, tv, etc.), deep breathing, meditation, consistent sleep/wake times, avoid naps, reduce caffeine (none after 3 PM); if you have worry thoughts try 'brain dump'    ASSESSMENT: Tito reports some mild improvement since SI joint injections. He has anticipatory anxiety about his trip to [place], open to exploring strategies to manage this anxiety as well as pain while he is there and while traveling.    PLAN:   Your next appointment is scheduled for 2/27 at 2:00 PM.  Assignment/Objectives /interventions for next session:   - try sleep hygiene tips as above  - use skills to cope ahead for trip to [place] and in general    We believe regular attendance is key to your success in our program!    Any time you are unable to keep your appointment we ask that you call us at [phone] at least 24 hours in advance to cancel.This will allow us to offer the appointment time to another patient.   Multiple missed appointments may lead to dismissal from the clinic.    Telemedicine Visit: The patient's condition can be safely assessed and treated via synchronous audio and visual telemedicine encounter.      Reason for Telemedicine Visit: Services only offered telehealth    Originating Site (Patient Location): Patient's home    Distant Site (Provider Location): Provider Remote Setting- Home Office    Consent:  The patient/guardian has verbally consented to: the potential risks and benefits of telemedicine (video visit) versus in person care; bill my insurance or make self-payment for services provided; and responsibility for payment of non-covered services.     Mode of Communication:  Video Conference via [employer]    As the provider I attest to compliance with applicable laws and regulations related to telemedicine.      Shirley Bartlett PsyD LP  Licensed Psychologist  Outpatient Clinic Therapist  [facility]

## 2024-02-13 NOTE — TELEPHONE ENCOUNTER
Pended order to repeat Bilateral Lateral femoral cutaneous nerve block. Routing to provider to review.     JAVON GarciaN, RN  Care Coordinator  [facility] [place]

## 2024-02-18 ENCOUNTER — MYC REFILL (OUTPATIENT)
Dept: FAMILY MEDICINE | Facility: CLINIC | Age: 49
End: 2024-02-18
Payer: MEDICARE

## 2024-02-18 DIAGNOSIS — G43.109 MIGRAINE WITH AURA AND WITHOUT STATUS MIGRAINOSUS, NOT INTRACTABLE: ICD-10-CM

## 2024-02-19 RX ORDER — RIZATRIPTAN BENZOATE 10 MG/1
10 TABLET ORAL
Qty: 30 TABLET | Refills: 0 | Status: SHIPPED | OUTPATIENT
Start: 2024-02-19 | End: 2024-03-11

## 2024-02-20 ENCOUNTER — TELEPHONE (OUTPATIENT)
Dept: FAMILY MEDICINE | Facility: CLINIC | Age: 49
End: 2024-02-20
Payer: MEDICARE

## 2024-02-20 NOTE — TELEPHONE ENCOUNTER
Patient called in.  Stated he is in [place].  Is having a migraine and has been taking the medications that was sent yesterday.  Patient wondering if he needs to go to the Emergency room.  Rate pain at 6/10 and intermittent.  Directed patient to seek care there as unable to triage due to being out of state.  Stated that urgent care should be able to work with patient but unsure of resources in [place].  Patient verbalized understanding.      Kristina Kjellberg, MSN, RN

## 2024-02-21 ENCOUNTER — NURSE TRIAGE (OUTPATIENT)
Dept: NURSING | Facility: CLINIC | Age: 49
End: 2024-02-21
Payer: MEDICARE

## 2024-02-21 NOTE — TELEPHONE ENCOUNTER
"    Nurse Triage SBAR    Is this a 2nd Level Triage? YES, LICENSED PRACTITIONER REVIEW IS REQUIRED    Situation: Pt calling from [place] with R eye pain that began Saturday afternoon. He is wondering if he should be seen in office when he gets back, available tomorrow (2/22) before noon as well as any time Friday.    Background: Last seen 4/28/23 by Dr. Diez in [place]. Next scheduled visit 5/6/24 (annual visit)    Hx T2DM, ankylosing spondylitis, hypothyroidism, HLD, POTS, myopia with astigmatism and presbyopia, allergic conjunctivitis     Assessment:   -Right eye, began on Saturday.  -Pain 4-6/10, \"like being stabbed by a pick\"  -Gets worse as day goes on  -Increased Light sensitivity  -Tried artifical tears when eye was very dry and they were uncomfortable  -Has been taking his Rizatriptan for his migraines and has been \"taking the edge off\"  -Wears glasses  -NO visual changes, floaters, flashes.     Protocol Recommended Disposition:   See Today In Office - PT OUT OF STATE    Recommendation: Please call patient for further assessment and recommendations, he is available tomorrow morning or any time Friday to be seen in clinic. Returning from [place] this evening. Informed if any increase in eye pain, vision changes or other new symptoms he will need to present to a local Urgent Care or ER. He understands and is amenable to plan, ok to leave detailed message.    Routed to provider/clinic    Kim DAILEY RN  P Central Nursing/[facility] Triage & Med Refill Team        Reason for Disposition   Eye pain present > 24 hours    Additional Information   Negative: Followed an eye injury   Negative: Eye pain from chemical in the eye   Negative: Eye pain from foreign body in eye   Negative: Has sinus pain or pressure   Negative: Severe eye pain   Negative: Complete loss of vision in one or both eyes   Negative: Eyelids are very swollen (shut or almost) and fever   Negative: Eyelid (outer) is very red and fever   Negative: " "Foreign body sensation ('feels like something is in there') and irrigation didn't help   Negative: Vomiting   Negative: Ulcer or sore seen on the cornea (clear center part of the eye)   Negative: Recent eye surgery and increasing eye pain   Negative: Blurred vision and new or worsening   Negative: Patient sounds very sick or weak to the triager   Negative: Eye pain/discomfort and more than mild   Negative: Eyelids are very swollen (shut or almost) and no fever   Negative: Painful rash near eye and multiple small blisters grouped together   Negative: Pain followed bright light exposure from welding   Negative: Looking at light causes severe pain (i.e., photophobia)    Answer Assessment - Initial Assessment Questions  1. ONSET: \"When did the pain start?\" (e.g., minutes, hours, days)      Began on Saturday afternoon  2. TIMING: \"Does the pain come and go, or has it been constant since it started?\" (e.g., constant, intermittent, fleeting)      Intermittent since Saturday, worsens throughout the day  3. SEVERITY: \"How bad is the pain?\"   (Scale 1-10; mild, moderate or severe)    - MILD (1-3): doesn't interfere with normal activities     - MODERATE (4-7): interferes with normal activities or awakens from sleep     - SEVERE (8-10): excruciating pain and patient unable to do normal activities      4 at lowest, 6 at highest  4. LOCATION: \"Where does it hurt?\"  (e.g., eyelid, eye, cheekbone)      Eye itself and orbital bone  5. CAUSE: \"What do you think is causing the pain?\"      Doesn't know  6. VISION: \"Do you have blurred vision or changes in your vision?\"       no  7. EYE DISCHARGE: \"Is there any discharge (pus) from the eye(s)?\"  If yes, ask: \"What color is it?\"       no  8. FEVER: \"Do you have a fever?\" If so, ask: \"What is it, how was it measured, and when did it start?\"       no  9. OTHER SYMPTOMS: \"Do you have any other symptoms?\" (e.g., headache, nasal discharge, facial rash)  +migraine, +light sensitivity    " "    10. PREGNANCY: \"Is there any chance you are pregnant?\" \"When was your last menstrual period?\"        N/a    Protocols used: Eye Pain-A-[place]    "

## 2024-02-21 NOTE — TELEPHONE ENCOUNTER
Eye pain worsening as day goes on per message with more light sensitivity and dryness symptoms-- artificial tears stinging eyes.    Tentatively scheduled on Friday at  clinic with Dr. Irby in open new time slot (Dr. Diez not in this week).    I will reach out again tomorrow when pt back in [facility] to review in more detail/confirm scheduling.    Mario Will RN 4:52 PM 02/21/24  s

## 2024-02-22 ENCOUNTER — MYC MEDICAL ADVICE (OUTPATIENT)
Dept: PALLIATIVE MEDICINE | Facility: OTHER | Age: 49
End: 2024-02-22
Payer: MEDICARE

## 2024-02-22 DIAGNOSIS — M51.369 DDD (DEGENERATIVE DISC DISEASE), LUMBAR: ICD-10-CM

## 2024-02-22 DIAGNOSIS — M45.8 ANKYLOSING SPONDYLITIS OF SACRAL REGION (H): ICD-10-CM

## 2024-02-22 NOTE — TELEPHONE ENCOUNTER
[employer] sent patient's medication from the patient assistance program.     Writer labeled and placed in secured fridge in Endocrinology.       Left voice mail for patient that medication arrived and ready for .   Gave M-F 8am to 4 pm as day/hours he could .     Radha Estevez, [facility]  Adult Endocrinology   [facility]

## 2024-02-22 NOTE — TELEPHONE ENCOUNTER
Left message with pt on home/mobile [phone]    Reviewed tentatively scheduled tomorrow at 1020 with Dr. Irby at  eye clinic.    Provided direct number if needs to review rescheduling options or having any acute vision changes/symptoms.    Mario Will RN 9:08 AM 02/22/24

## 2024-02-23 ENCOUNTER — E-VISIT (OUTPATIENT)
Dept: FAMILY MEDICINE | Facility: CLINIC | Age: 49
End: 2024-02-23
Payer: MEDICARE

## 2024-02-23 ENCOUNTER — OFFICE VISIT (OUTPATIENT)
Dept: OPHTHALMOLOGY | Facility: CLINIC | Age: 49
End: 2024-02-23
Payer: MEDICARE

## 2024-02-23 ENCOUNTER — MYC REFILL (OUTPATIENT)
Dept: FAMILY MEDICINE | Facility: CLINIC | Age: 49
End: 2024-02-23

## 2024-02-23 DIAGNOSIS — G43.709 CHRONIC MIGRAINE W/O AURA W/O STATUS MIGRAINOSUS, NOT INTRACTABLE: Primary | ICD-10-CM

## 2024-02-23 DIAGNOSIS — G43.109 MIGRAINE WITH AURA AND WITHOUT STATUS MIGRAINOSUS, NOT INTRACTABLE: ICD-10-CM

## 2024-02-23 DIAGNOSIS — E53.8 B12 DEFICIENCY: ICD-10-CM

## 2024-02-23 PROCEDURE — 92002 INTRM OPH EXAM NEW PATIENT: CPT | Performed by: OPTOMETRIST

## 2024-02-23 PROCEDURE — 99421 OL DIG E/M SVC 5-10 MIN: CPT | Performed by: FAMILY MEDICINE

## 2024-02-23 RX ORDER — CYANOCOBALAMIN 1000 UG/ML
INJECTION, SOLUTION INTRAMUSCULAR; SUBCUTANEOUS
Qty: 10 ML | Refills: 0 | Status: SHIPPED | OUTPATIENT
Start: 2024-02-23 | End: 2024-03-01

## 2024-02-23 RX ORDER — SYRINGE WITH NEEDLE, 1 ML 25GX5/8"
SYRINGE, EMPTY DISPOSABLE MISCELLANEOUS
Qty: 12 EACH | Refills: 0 | Status: SHIPPED | OUTPATIENT
Start: 2024-02-23 | End: 2024-05-19

## 2024-02-23 RX ORDER — TOPIRAMATE 25 MG/1
TABLET, FILM COATED ORAL
Qty: 90 TABLET | Refills: 0 | Status: SHIPPED | OUTPATIENT
Start: 2024-02-23 | End: 2024-03-18

## 2024-02-23 ASSESSMENT — REFRACTION_WEARINGRX
OS_ADD: +1.75
OS_AXIS: 105
OD_ADD: +1.75
OS_SPHERE: -4.50
OD_AXIS: 040
OD_CYLINDER: +1.25
OD_SPHERE: -3.75
OS_CYLINDER: +1.50

## 2024-02-23 ASSESSMENT — VISUAL ACUITY
OS_CC: 20/20
METHOD: SNELLEN - LINEAR
CORRECTION_TYPE: GLASSES
OS_CC+: -1
OD_CC: 20/20

## 2024-02-23 ASSESSMENT — SLIT LAMP EXAM - LIDS
COMMENTS: NORMAL
COMMENTS: NORMAL

## 2024-02-23 ASSESSMENT — EXTERNAL EXAM - LEFT EYE: OS_EXAM: NORMAL

## 2024-02-23 ASSESSMENT — EXTERNAL EXAM - RIGHT EYE: OD_EXAM: NORMAL

## 2024-02-23 ASSESSMENT — CUP TO DISC RATIO
OD_RATIO: 0.25
OS_RATIO: 0.2

## 2024-02-23 ASSESSMENT — TONOMETRY
OS_IOP_MMHG: 15
IOP_METHOD: ICARE
OD_IOP_MMHG: 15

## 2024-02-23 NOTE — TELEPHONE ENCOUNTER
Received call from patient requesting refill(s) of oxycodone 5 MG tablets      Last dispensed from pharmacy on 01/26/24    Patient's last office/virtual visit by prescribing provider on 01/19/24  Next office/virtual appointment scheduled for 02/29/24    Last urine drug screen date 06/08/23  Current opioid agreement on file (completed within the last year) Yes Date of opioid agreement: 06/28/23    E-prescribe to pharmacy-  [id number] - [place], [place] - [address]     Will route to nursing [place] for review and preparation of prescription(s).

## 2024-02-23 NOTE — NURSING NOTE
Chief Complaints and History of Present Illnesses   Patient presents with    Eye Pain Right Eye     Chief Complaint(s) and History of Present Illness(es)       Eye Pain Right Eye              Laterality: In right eye    Quality: sharp pain    Pain scale: 6/10    Course: gradually worsening    Associated symptoms: photophobia, headaches, nausea and migraines.  Negative for blurred vision              Comments    Here for sharp eye pain that started about 1 week ago. Since onset, symptoms worsening. Some migraines with photophobia. Some nausea. VA is doing okay.    Ovidio WIGGINS 10:04 AM February 23, 2024

## 2024-02-23 NOTE — TELEPHONE ENCOUNTER
Please process a refill of oxycodone 5 MG tablets  to     [id number] - [place], [place] - [address]  [address]  [place] [address]  Phone: [phone] Fax: [phone]

## 2024-02-23 NOTE — PROGRESS NOTES
Assessment/Plan  (G43.709) Chronic migraine w/o aura w/o status migrainosus, not intractable  (primary encounter diagnosis)  Comment: Most likely source of pain behind right eye. No evidence of ocular surface disease or iritis today.   Plan: Recommend patient meet with a headache specialist to discuss alternative treatment measures. Continue using migraine medication as prescribed for now.         Complete documentation of historical and exam elements from today's encounter can  be found in the full encounter summary report (not reduplicated in this progress  note). I personally obtained the chief complaint(s) and history of present illness. I  confirmed and edited as necessary the review of systems, past medical/surgical  history, family history, social history, and examination findings as documented by  others; and I examined the patient myself. I personally reviewed the relevant tests,  images, and reports as documented above. I formulated and edited as necessary the  assessment and plan and discussed the findings and management plan with the  patient and family.    Aung Irby OD

## 2024-02-23 NOTE — TELEPHONE ENCOUNTER
Medication refill information reviewed.     Due date for oxycodone 5 MG tablets    is 02/26/24     Prescriptions prepped for review.     Will route to provider.

## 2024-02-23 NOTE — TELEPHONE ENCOUNTER
Call placed to pharmacy: continued e-scribe issues and this will not transmit successfully  Call placed to patient: patient is ok waiting until Monday to see if the e-scribe issues are resolved, retry on Monday and if still not working he would then be able to  a physical copy of the script.

## 2024-02-24 NOTE — TELEPHONE ENCOUNTER
Patient states he does not need a refill at this time.  Will call pharmacy when he does need a refill.    Fanny DAILEY MSN, RN

## 2024-02-25 ASSESSMENT — PAIN SCALES - PAIN ENJOYMENT GENERAL ACTIVITY SCALE (PEG)
INTERFERED_ENJOYMENT_LIFE: 7
AVG_PAIN_PASTWEEK: 5
INTERFERED_ENJOYMENT_LIFE: 7
PEG_TOTALSCORE: 6.33
INTERFERED_GENERAL_ACTIVITY: 7
PEG_TOTALSCORE: 6.33
INTERFERED_GENERAL_ACTIVITY: 7
AVG_PAIN_PASTWEEK: 5

## 2024-02-26 ENCOUNTER — OFFICE VISIT (OUTPATIENT)
Dept: PALLIATIVE MEDICINE | Facility: CLINIC | Age: 49
End: 2024-02-26
Attending: NURSE PRACTITIONER
Payer: MEDICARE

## 2024-02-26 ENCOUNTER — TELEPHONE (OUTPATIENT)
Dept: PALLIATIVE MEDICINE | Facility: CLINIC | Age: 49
End: 2024-02-26

## 2024-02-26 VITALS — DIASTOLIC BLOOD PRESSURE: 86 MMHG | SYSTOLIC BLOOD PRESSURE: 132 MMHG | HEART RATE: 61 BPM

## 2024-02-26 DIAGNOSIS — M79.18 MYOFASCIAL MUSCLE PAIN: ICD-10-CM

## 2024-02-26 DIAGNOSIS — M51.369 DDD (DEGENERATIVE DISC DISEASE), LUMBAR: ICD-10-CM

## 2024-02-26 DIAGNOSIS — M47.816 SPONDYLOSIS OF LUMBAR REGION WITHOUT MYELOPATHY OR RADICULOPATHY: Primary | ICD-10-CM

## 2024-02-26 DIAGNOSIS — M45.8 ANKYLOSING SPONDYLITIS OF SACRAL REGION (H): ICD-10-CM

## 2024-02-26 DIAGNOSIS — M62.838 MUSCLE SPASM: ICD-10-CM

## 2024-02-26 PROCEDURE — 20553 NJX 1/MLT TRIGGER POINTS 3/>: CPT | Performed by: PAIN MEDICINE

## 2024-02-26 RX ORDER — OXYCODONE HYDROCHLORIDE 5 MG/1
5 TABLET ORAL 3 TIMES DAILY PRN
Qty: 60 TABLET | Refills: 0 | Status: SHIPPED | OUTPATIENT
Start: 2024-02-26 | End: 2024-02-29

## 2024-02-26 RX ORDER — OXYCODONE HYDROCHLORIDE 5 MG/1
5 TABLET ORAL 3 TIMES DAILY PRN
Qty: 60 TABLET | Refills: 0 | Status: SHIPPED | OUTPATIENT
Start: 2024-02-26 | End: 2024-02-26

## 2024-02-26 RX ADMIN — BUPIVACAINE HYDROCHLORIDE 25 MG: 2.5 INJECTION, SOLUTION EPIDURAL; INFILTRATION; INTRACAUDAL at 13:08

## 2024-02-26 ASSESSMENT — PAIN SCALES - GENERAL: PAINLEVEL: MODERATE PAIN (4)

## 2024-02-26 NOTE — TELEPHONE ENCOUNTER
Stacia Jack, JOCELYN SCOTT Pray2 hours ago (12:10 PM)       Tito,      [facility]. We have heard that [facility] pharmacies may be down for another week or more. Just in case this comes up for you with any other medication refills.      I hope your headache is better soon!      Script Eprescribed to pharmacy     Signed Prescriptions:                        Disp   Refills    oxyCODONE (ROXICODONE) 5 MG tablet         60 tab*0        Sig: Take 1 tablet (5 mg) by mouth 3 times daily as needed           for pain Ok to fill/start 02/26/24. 30 day supply           for chronic pain.  Authorizing Provider: STACIA JACK

## 2024-02-26 NOTE — TELEPHONE ENCOUNTER
[facility] Call Center    Phone Message    May a detailed message be left on voicemail: yes     Reason for Call: Medication Question or concern regarding medication   Prescription Clarification  Name of Medication: oxyCODONE (ROXICODONE) 5 MG tablet   Prescribing Provider: Marcie   Pharmacy: NA   What on the order needs clarification? Patient called in and stated that someone called him on Saturday 2/24 and let patient know Stacia Jack had a paper copy of the prescription for patient to  at the  in [place]. Patient has an appt with Valentina today in [place] and is wondering if he can just get it then. Please call back and advise.       Action Taken: Message routed to:  Other: Pain    Travel Screening: Not Applicable

## 2024-02-26 NOTE — TELEPHONE ENCOUNTER
Note: the oxycodone refill will be filled at the  [facility]. Pt has an appointment with Dr. Montgomery today and he signed a new refill and sent it down.  The script that was sent to [place] was cancelled.      Eloisa RN-BSN  [facility]   [phone]

## 2024-02-26 NOTE — TELEPHONE ENCOUNTER
I will take care of this. Sending to a different pharmacy that is able to accept electronic refills.     Thanks, JOCELYN Vo, NP-C  [facility] [place]

## 2024-02-27 ENCOUNTER — TRANSFERRED RECORDS (OUTPATIENT)
Dept: HEALTH INFORMATION MANAGEMENT | Facility: CLINIC | Age: 49
End: 2024-02-27

## 2024-02-27 RX ORDER — BUPIVACAINE HYDROCHLORIDE 2.5 MG/ML
10 INJECTION, SOLUTION EPIDURAL; INFILTRATION; INTRACAUDAL ONCE
Status: COMPLETED | OUTPATIENT
Start: 2024-02-26 | End: 2024-02-26

## 2024-02-27 NOTE — PROGRESS NOTES
Joel was seen today for pain.    Diagnoses and all orders for this visit:    Spondylosis of lumbar region without myelopathy or radiculopathy    Myofascial muscle pain  -     PAIN INJECTION EVAL/TREAT/FOLLOW UP  -     NO CHARGE LOS  -     bupivacaine (MARCAINE) 0.25% preservative free injection    Muscle spasm  -     PAIN INJECTION EVAL/TREAT/FOLLOW UP    DDD (degenerative disc disease), lumbar  -     oxyCODONE (ROXICODONE) 5 MG tablet; Take 1 tablet (5 mg) by mouth 3 times daily as needed for pain Ok to fill/start 02/26/24. 30 day supply for chronic pain.    Ankylosing spondylitis of sacral region (H)  -     oxyCODONE (ROXICODONE) 5 MG tablet; Take 1 tablet (5 mg) by mouth 3 times daily as needed for pain Ok to fill/start 02/26/24. 30 day supply for chronic pain.        Trigger points were identified by patient, and marked when appropriate.  The area was prepped with Chloroprep.    Using clean technique, injections were completed using a 25G, 3.5 inch needle.  After negative aspiration, injection was completed.  A total of 5 locations were injected.  When possible, tissue was retracted from the chest wall to avoid lung injury.    Muscle groups injected:  Bilateral trapezius, rhomboids, latissimus dorsi    Injection solution contained:  10ml of 0.25% bupivacaine         Bubba Montgomery MD  [facility]

## 2024-02-29 ENCOUNTER — VIRTUAL VISIT (OUTPATIENT)
Dept: PALLIATIVE MEDICINE | Facility: OTHER | Age: 49
End: 2024-02-29
Payer: MEDICARE

## 2024-02-29 ENCOUNTER — OFFICE VISIT (OUTPATIENT)
Dept: FAMILY MEDICINE | Facility: CLINIC | Age: 49
End: 2024-02-29
Payer: MEDICARE

## 2024-02-29 VITALS
BODY MASS INDEX: 34.22 KG/M2 | WEIGHT: 281 LBS | DIASTOLIC BLOOD PRESSURE: 82 MMHG | HEART RATE: 70 BPM | SYSTOLIC BLOOD PRESSURE: 120 MMHG | OXYGEN SATURATION: 96 % | RESPIRATION RATE: 15 BRPM | HEIGHT: 76 IN | TEMPERATURE: 98.5 F

## 2024-02-29 DIAGNOSIS — G62.9 PERIPHERAL POLYNEUROPATHY: ICD-10-CM

## 2024-02-29 DIAGNOSIS — M47.816 SPONDYLOSIS OF LUMBAR REGION WITHOUT MYELOPATHY OR RADICULOPATHY: ICD-10-CM

## 2024-02-29 DIAGNOSIS — R51.9 UNILATERAL HEADACHE: Primary | ICD-10-CM

## 2024-02-29 DIAGNOSIS — G43.111 INTRACTABLE MIGRAINE WITH AURA WITH STATUS MIGRAINOSUS: ICD-10-CM

## 2024-02-29 DIAGNOSIS — M51.369 DDD (DEGENERATIVE DISC DISEASE), LUMBAR: ICD-10-CM

## 2024-02-29 DIAGNOSIS — J01.90 ACUTE NON-RECURRENT SINUSITIS, UNSPECIFIED LOCATION: ICD-10-CM

## 2024-02-29 DIAGNOSIS — M45.8 ANKYLOSING SPONDYLITIS OF SACRAL REGION (H): ICD-10-CM

## 2024-02-29 DIAGNOSIS — G89.29 CHRONIC INTRACTABLE PAIN: Primary | ICD-10-CM

## 2024-02-29 PROCEDURE — 99213 OFFICE O/P EST LOW 20 MIN: CPT | Performed by: INTERNAL MEDICINE

## 2024-02-29 PROCEDURE — 99214 OFFICE O/P EST MOD 30 MIN: CPT | Mod: 95 | Performed by: NURSE PRACTITIONER

## 2024-02-29 RX ORDER — BISACODYL 5 MG/1
10 TABLET, DELAYED RELEASE ORAL PRN
COMMUNITY

## 2024-02-29 RX ORDER — OXYCODONE AND ACETAMINOPHEN 5; 325 MG/1; MG/1
1 TABLET ORAL 2 TIMES DAILY PRN
Qty: 10 TABLET | Refills: 0 | Status: SHIPPED | OUTPATIENT
Start: 2024-02-29 | End: 2024-03-05

## 2024-02-29 RX ORDER — OXYCODONE HYDROCHLORIDE 5 MG/1
5 TABLET ORAL 2 TIMES DAILY PRN
Qty: 60 TABLET | Refills: 0 | Status: SHIPPED | OUTPATIENT
Start: 2024-02-29 | End: 2024-04-08

## 2024-02-29 RX ORDER — DOXYCYCLINE 100 MG/1
100 CAPSULE ORAL 2 TIMES DAILY
Qty: 14 CAPSULE | Refills: 0 | Status: SHIPPED | OUTPATIENT
Start: 2024-02-29 | End: 2024-03-07

## 2024-02-29 RX ORDER — PREDNISONE 10 MG/1
30 TABLET ORAL DAILY
Qty: 15 TABLET | Refills: 0 | Status: SHIPPED | OUTPATIENT
Start: 2024-02-29 | End: 2024-04-08

## 2024-02-29 ASSESSMENT — PATIENT HEALTH QUESTIONNAIRE - PHQ9
SUM OF ALL RESPONSES TO PHQ QUESTIONS 1-9: 16
10. IF YOU CHECKED OFF ANY PROBLEMS, HOW DIFFICULT HAVE THESE PROBLEMS MADE IT FOR YOU TO DO YOUR WORK, TAKE CARE OF THINGS AT HOME, OR GET ALONG WITH OTHER PEOPLE: VERY DIFFICULT

## 2024-02-29 ASSESSMENT — ASTHMA QUESTIONNAIRES
QUESTION_1 LAST FOUR WEEKS HOW MUCH OF THE TIME DID YOUR ASTHMA KEEP YOU FROM GETTING AS MUCH DONE AT WORK, SCHOOL OR AT HOME: NONE OF THE TIME
QUESTION_4 LAST FOUR WEEKS HOW OFTEN HAVE YOU USED YOUR RESCUE INHALER OR NEBULIZER MEDICATION (SUCH AS ALBUTEROL): ONCE A WEEK OR LESS
QUESTION_3 LAST FOUR WEEKS HOW OFTEN DID YOUR ASTHMA SYMPTOMS (WHEEZING, COUGHING, SHORTNESS OF BREATH, CHEST TIGHTNESS OR PAIN) WAKE YOU UP AT NIGHT OR EARLIER THAN USUAL IN THE MORNING: NOT AT ALL
ACT_TOTALSCORE: 23
ACT_TOTALSCORE: 23
QUESTION_2 LAST FOUR WEEKS HOW OFTEN HAVE YOU HAD SHORTNESS OF BREATH: NOT AT ALL
QUESTION_5 LAST FOUR WEEKS HOW WOULD YOU RATE YOUR ASTHMA CONTROL: WELL CONTROLLED

## 2024-02-29 ASSESSMENT — PAIN SCALES - GENERAL: PAINLEVEL: MODERATE PAIN (5)

## 2024-02-29 NOTE — PROGRESS NOTES
[facility]      Joel Pineda is a 48 year old male who is being evaluated via a billable virtual visit.      Tito is hoping to inquire about some xray's of sinus to be sure of a potential sinus infection/ PT would also like to follow up regarding previous encounter with Dr. Montgomery     VIDEO VISIT   How would you like to obtain your AVS? MyChart  If you are dropped from the video visit, the video invite should be resent to: Text to cell phone: [phone]  Will anyone else be joining your video visit? NO,  Is patient CURRENTLY in [place]? YES  If patient encounters technical issues they should call [phone]    Video-Visit Details  Type of service:  Video Visit  Video Start Time: 9:56 AM  Video End Time: 10:25 AM  Total Face to Face Time: 29 minutes   Originating Location (pt. Location): Home  Distant Location (provider location):  [facility]  [facility]          9/12/2023    10:10 AM 10/25/2023     6:30 PM 2/29/2024     9:50 AM   PHQ-9 SCORE   PHQ-9 Total Score MyChart 12 (Moderate depression) 11 (Moderate depression) 16 (Moderately severe depression)   PHQ-9 Total Score 12 11 16           8/4/2023    11:14 AM 9/13/2023     2:58 PM 12/26/2023    10:19 AM   YUDI-7 SCORE   Total Score 12 (moderate anxiety) 11 (moderate anxiety) 7 (mild anxiety)   Total Score 12 11 7           11/30/2023     1:20 PM 1/19/2024     1:24 PM 2/29/2024     9:08 AM   PEG Score   PEG Total Score 6.67 3.33 6.67          CHIEF COMPLAINT: Chronic pain    INTERVAL HISTORY:  Last seen on 1/19/24.       Recommendations/plan at the last visit included:  Health Psychology: YES Continue per Dr Bartlett's recommendations  Physical therapy: NO Continue home exercise program   30 minutes Video or Clinic follow-up with JOCELYN Zavala, NP-C in 2 months ot sooner as needed . Ok to schedule up to three follow up appts at a time, alternating clinic and video visits.    Procedures recommended:   Right SI Joint ordered.   Trigger point injection for neck, traps, upper back.  Medication Management :   Prep for contrast allergy:   Take Medrol 32 mg 12 hours before the injection appt time,  Take Medrol 32 mg 2 hours before procedure,    Finally, take Benedryl 50 mg 1 hour prior to procedure  If you have any concerns about how these medications     Since last appt:   Tito has had a migraine since 2/17/24. Has been using Maxalt BID. Has been tapering to once daily. Pain is worst with some positions. TPIs didn't not help the HA but did help muscle tension. Pain is like a ice pick and focused on right temple,eye, Had some pain in neck/back of head when this started, Able to sleep through the night     Headache History:   Initial cause of headaches if known: After having Transcranial Magnetic Stimulation  Age of onset: 43  Frequency and Timing of Headaches: Generally 3 days, monthly, Most recently 12 + days.   Symptoms:   Aura:  has gotten flashing lights  Photophobia:  Yes  Phonophobia:  Yes  Nausea:  Yes  Vomiting:  Yes  Description of HA:    Unilateral or full head pain: Bilateral, starts at the back of the head, encompasses full head   Previous medications: Maxalt, Oxycodone, Tylenol, Clonazepam, Benedryl, Marinol, Atenolol, Metoprolol, Baclofen, Celebrex,   Previous therapies: Trigger point injections, stretches, PT    Pain Information Today: Score: 5 /10. Location of pain: temples/head (intense nausea)       DIRE Score for ongoing opioid management is calculated as follows:    Diagnosis = 3 pts (advanced condition; severe pain/objective findings)    Intractability = 3 pts (patient fully engaged but inadequate response to treatments)    Risk        Psych = 3 pts (no significant personality dysfunction/mental illness; good communication with clinic)         Chem Hlth = 3 pts (no history of chemical dependency; not drug-focused)       Reliability = 2 pts (occasional difficulties with  compliance; generally reliable)       Social = 2 pts (reduction in some relationships/life rolls)       (Psych + Chem hlth + Reliability + Social) = 16    Efficacy = 2 pts (moderate benefit/function; low med dose; too early/not tried meds)    DIRE Score = 18        7-13: likely NOT suitable candidate for long-term opioid analgesia       14-21: may be a suitable candidate for long-term opioid analgesia     Previous Diagnostic Tests:   Imaging Studies:   MR LUMBAR SPINE W/O CONTRAST 6/27/2019  Impression:   1. Multilevel lumbar spondylosis, most pronounced at L5-S1 with  moderate to severe left and moderate right neural foraminal stenosis as well as narrowing of the left lateral recess and abutment the traversing left S1 nerve root.   2. Additional multilevel degenerative changes of the lumbar spine as described above.     PAIN RELEVANT CONDITIONS:   1.  Postherpetic neuralgia  2.  Ankylosing spondylosis, Lumbar DDD with left S1 nerve involvement, lumbar stenosis  3.  Chronic migraine headaches    _______________________________________________      Physical Exam and Vital Signs not completed due to virtual visit.     General: Verbal, alert and no distress   Psychiatric:  affect and mood normal, mentation appears normal.     DIRE Score for ongoing opioid management is calculated as follows:    Diagnosis = 3 pts (advanced condition; severe pain/objective findings)    Intractability = 3 pts (patient fully engaged but inadequate response to treatments)    Risk        Psych = 3 pts (no significant personality dysfunction/mental illness; good communication with clinic)         Chem Hlth = 3 pts (no history of chemical dependency; not drug-focused)       Reliability = 2 pts (occasional difficulties with compliance; generally reliable)       Social = 2 pts (reduction in some relationships/life rolls)       (Psych + Chem hlth + Reliability + Social) = 16    Efficacy = 2 pts (moderate benefit/function; low med dose; too  early/not tried meds)    DIRE Score = 18        7-13: likely NOT suitable candidate for long-term opioid analgesia       14-21: may be a suitable candidate for long-term opioid analgesia     Previous Diagnostic Tests:   Imaging Studies:   MR LUMBAR SPINE W/O CONTRAST 2019  Impression:   1. Multilevel lumbar spondylosis, most pronounced at L5-S1 with  moderate to severe left and moderate right neural foraminal stenosis as well as narrowing of the left lateral recess and abutment the traversing left S1 nerve root.   2. Additional multilevel degenerative changes of the lumbar spine as described above.     PAIN RELEVANT CONDITIONS:   1.  Postherpetic neuralgia  2.  Ankylosing spondylosis, Lumbar DDD with left S1 nerve involvement, lumbar stenosis  3.  Chronic migraine headaches    PATIENT INSTRUCTIONS:     Diagnosis reviewed, treatment option addressed, and risk/benefits discussed.  Self-care instructions given.  I am recommending a multidisciplinary treatment plan to help this patient better manage pain.    Remember to request ALL medication refills 5-7 BUSINESS days before you run out.       30 minute Clinic follow-up with JOCELYN Zavala, NP-Con 24 at 2:25 arrival for 2:30 appointment time.  Ok to schedule up to three follow up appts at a time, alternating virtual and clinic appointments.   Referrals: Let Stacia know if you need a referral sent to [facility]. We just need a fax number to send it.   Medication Management :   Prednisone 10 mg: take 3 tabs in the food AFTER BREAKFAST for 5 days or until migraine breaks.  Percocet 5/325 m tab two x/day as need for severe migraine pain   Oxycodone 5 mg: refill sent to  on  3/25/24 and begin using on 3/24/24    I have reviewed the note as documented above.  This accurately captures the substance of my conversation with the patient.    BILLING TIME DOCUMENTATION:   TOTAL TIME on the date of service includes:   Time spent preparing to see  the patient: 0 minutes (reviewing records and tests)  Time spend face to face with the patient: 29 minutes  Time spent ordering tests, medications, procedures and referrals: 0 minutes  Time spent Referring and communicating with other healthcare professionals: 0 minutes  Documenting clinical information in Epic: 4 minutes    The total TIME spent on this patient on the day of the appointment was 33 minutes.     JOCELYN Padgett, NP-C  [facility]    (Information in italics and blue color are taken from previous pain and consulting medical providers notes and are documented as such)

## 2024-02-29 NOTE — PATIENT INSTRUCTIONS
After Visit Instructions:     Thank you for coming to [facility] for your care. It is my goal to partner with you to help you reach your optimal state of health.   Continue daily self-care, identifying contributing factors, and monitoring variations in pain level. Continue to integrate self-care into your life.      30 minute Clinic follow-up with JOCELYN Zavala NP-Con 24 at 2:25 arrival for 2:30 appointment time.  Ok to schedule up to three follow up appts at a time, alternating virtual and clinic appointments.   Referrals: Let Stacia know if you need a referral sent to [facility]. We just need a fax number to send it.   Medication Management :   Prednisone 10 mg: take 3 tabs in the food AFTER BREAKFAST for 5 days or until migraine breaks.  Percocet 5/325 m tab two x/day as need for severe migraine pain   Oxycodone 5 mg: refill sent to  on  3/25/24 and begin using on 3/24/24      JOCELYN Padgett NP-C  [facility]  [facility] - Monday, Thursday and Friday  [facility] - Tuesday      Be sure to request ALL medication refills 5 days prior to the due date whether or not you will see your medical provider in an appointment before the due date.      Do not expect same day refills. If you do not plan ahead you may run out of medications.     Early refills are not provided.  It is your responsibility to manage your medications responsibility and keep them safely stored. Lost or destroyed medications WILL NOT be replaced    Scheduling/Clinic telephone Number for ALL locations:  [phone]    After Hours On-Call Service:  [phone]    Call with any questions about your care and for scheduling assistance.   Calls are returned Monday through Friday between 8 AM and 4:00 PM. We usually get back to you within 2 business days depending on the issue/request.    If we are prescribing your medications:  For opioid medication refills,  call the clinic or send a [employer] message 7 days in advance.  Please include:  Your name and date of birth.   Name of requested medication  Name of the pharmacy.  For non-opioid medications, call your pharmacy directly to request a refill. Please allow 3-4 days to be processed.   Per [place] State Law:  All controlled substance prescriptions must be filled within 30 days of being written.    For those controlled substances allowing refills, pickup must occur within 30 days of last fill.      We believe regular attendance is key to your success in our program!    Any time you are unable to keep your appointment we ask that you call us at least 24 hours in advance to cancel.This will allow us to offer the appointment time to another patient.   Multiple missed appointments may lead to dismissal from the clinic.

## 2024-02-29 NOTE — PROGRESS NOTES
Assessment & Plan     1.  Unilateral headache x 2 weeks.  Differential diagnoses include migraine and cluster headache.  Unlikely to be a space-occupying lesion in the brain.  Patient concerned it could be sinusitis.  He has been referred to headache clinic.  Dr. Lyles, recently started him on Topamax med and burst prednisone therapy.  He is already been taking Maxalt (triptan).  2.  Acute sinusitis clinically seems unlikely.  However giving benefit of doubt decided to treat with doxycycline 100 mg twice a day for 7 days.  Patient informed the best way to check for sinus issues would be to do a CT scan of the sinuses.  For now he will see how he responds to the antibiotic.    Depression Screening Follow Up        2/29/2024     9:50 AM   PHQ   PHQ-9 Total Score 16   Q9: Thoughts of better off dead/self-harm past 2 weeks More than half the days   F/U: Thoughts of suicide or self-harm Yes   F/U: Self harm-plan No   F/U: Self-harm action No   F/U: Safety concerns No         Follow Up    Follow Up Actions Taken      Discussed the following ways the patient can remain in a safe environment:        Radha Francis is a 48 year old, presenting for the following health issues:  Sinus Problem (Possible sinus infection. Has been feeling feverish last few days along with having some sinus pressure/pain x 2 weeks. Hoping to get xray of his sinuses to confirm)        2/29/2024     2:45 PM   Additional Questions   Roomed by Nory ALLEN   Accompanied by Self         2/29/2024     2:45 PM   Patient Reported Additional Medications   Patient reports taking the following new medications None     Sinus Problem     History of Present Illness       Headaches:   Since the patient's last clinic visit, headaches are: worsened  The patient is getting headaches:  Daily since Feb 17  He is not able to do normal daily activities when he has a migraine.  The patient is taking the following rescue/relief medications:  Tylenol and Maxalt  "  Patient states \"I get some relief\" from the rescue/relief medications.   The patient is taking the following medications to prevent migraines:  Topomax  In the past 4 weeks, the patient has gone to an Urgent Care or Emergency Room 0 times times due to headaches.    He eats 0-1 servings of fruits and vegetables daily.He consumes 2 sweetened beverage(s) daily.He exercises with enough effort to increase his heart rate 9 or less minutes per day.  He exercises with enough effort to increase his heart rate 3 or less days per week.   He is taking medications regularly.         Concern - Possible sinus infection    Onset: 2 weeks ago   Description: Sinus symptoms combined with headaches  Intensity: moderate  Progression of Symptoms:  worsening  Accompanying Signs & Symptoms: Sinus pressure/pain that radiates  Previous history of similar problem: N/A  Precipitating factors:        Worsened by: Changing positions, neilmed flush for sinuses  Alleviating factors:        Improved by: Migraine medication helped some  Therapies tried and outcome: Neilmed flush, migraine meds    48-year-old gentleman with multiple health issues as mentioned in the problem list comes in with a 2-week history of sharp pain in just behind the right eye.  Indicates he had his eyes examined and it was normal.  It has been daily headache and at times associated with nausea and also photophobia.  Even sounds bother him at times.  He has been taking Maxalt 10 mg twice a day.  Recently seen by Dr. Lyles and placed on short course of prednisone therapy and topiramate.  He has been referred to headache clinic.  The headache has gradually gotten little better but is still persistent.  At times feels feverish so he is thinking he could have sinus infection.  He has some drainage from the nose and also some postnasal drainage.  No history of chronic sinusitis.    Review of Systems  Constitutional, neuro, ENT, endocrine, pulmonary, cardiac, gastrointestinal, " "genitourinary, musculoskeletal, integument and psychiatric systems are negative, except as otherwise noted.      Objective    /82 (BP Location: Right arm, Patient Position: Sitting, Cuff Size: Adult Large)   Pulse 70   Temp 98.5  F (36.9  C) (Oral)   Resp 15   Ht 1.93 m (6' 4\")   Wt 127.5 kg (281 lb)   SpO2 96%   BMI 34.20 kg/m    Body mass index is 34.2 kg/m .  Physical Exam   GENERAL: alert and no distress  HENT: normal cephalic/atraumatic, ear canals and TM's normal, nose and mouth without ulcers or lesions, oropharynx clear, oral mucous membranes moist, and no sinus tenderness  NECK: no adenopathy, no asymmetry, masses, or scars            Signed Electronically by: Perez Ospina MD    "

## 2024-02-29 NOTE — LETTER
Opioid / Opioid Plus Controlled Substance Agreement    This is an agreement between you and your provider about the safe and appropriate use of controlled substance/opioids prescribed by your care team. Controlled substances are medicines that can cause physical and mental dependence (abuse).    There are strict laws about having and using these medicines. We here at [facility] are committing to working with you in your efforts to get better. To support you in this work, we ll help you schedule regular office appointments for medicine refills. If we must cancel or change your appointment for any reason, we ll make sure you have enough medicine to last until your next appointment.     As a Provider, I will:  Listen carefully to your concerns and treat you with respect.   Recommend a treatment plan that I believe is in your best interest. This plan may involve therapies other than opioid pain medication.   Talk with you often about the possible benefits, and the risk of harm of any medicine that we prescribe for you.   Provide a plan on how to taper (discontinue or go off) using this medicine if the decision is made to stop its use.    As a Patient, I understand that opioid(s):   Are a controlled substance prescribed by my care team to help me function or work and manage my condition(s).   Are strong medicines and can cause serious side effects such as:  Drowsiness, which can seriously affect my driving ability  A lower breathing rate, enough to cause death  Harm to my thinking ability   Depression   Abuse of and addiction to this medicine  Need to be taken exactly as prescribed. Combining opioids with certain medicines or chemicals (such as illegal drugs, sedatives, sleeping pills, and benzodiazepines) can be dangerous or even fatal. If I stop opioids suddenly, I may have severe withdrawal symptoms.  Do not work for all types of pain nor for all patients. If they re not helpful, I may be asked to stop  them.        The risks, benefits and side effects of these medicine(s) were explained to me. I agree that:  I will take part in other treatments as advised by my care team. This may be psychiatry or counseling, physical therapy, behavioral therapy, group treatment or a referral to a specialist.     I will keep all my appointments. I understand that this is part of the monitoring of opioids. My care team may require an office visit for EVERY opioid/controlled substance refill. If I miss appointments or don t follow instructions, my care team may stop my medicine.    I will take my medicines as prescribed. I will not change the dose or schedule unless my care team tells me to. There will be no refills if I run out early.     I may be asked to come to the clinic and complete a urine drug test or complete a pill count at any time. If I don t give a urine sample or participate in a pill count, the care team may stop my medicine.    I will only receive prescriptions from this clinic for chronic pain. If I am treated by another provider for acute pain issues, I will tell them that I am taking opioid pain medication for chronic pain and that I have a treatment agreement with this provider. I will inform my [facility] care team within one business day if I am given a prescription for any pain medication by another healthcare provider. My [facility] care team can contact other providers and pharmacists about my use of any medicines.    It is up to me to make sure that I don t run out of my medicines on weekends or holidays. If my care team is willing to refill my opioid prescription without a visit, I must request refills only during office hours. Refills may take up to 3 business days to process. I will use one pharmacy to fill all my opioid and other controlled substance prescriptions. I will notify the clinic about any changes to my insurance or medication availability.    I am responsible for my  prescriptions. If the medicine/prescription is lost, stolen or destroyed, it will not be replaced. I also agree not to share controlled substance medicines with anyone.    I am aware I should not use any illegal or recreational drugs. I agree not to drink alcohol unless my care team says I can.       If I enroll in the [place] [facility] program, I will tell my care team prior to my next refill.     I will tell my care team right away if I become pregnant, have a new medical problem treated outside of my regular clinic, or have a change in my medications.    I understand that this medicine can affect my thinking, judgment and reaction time. Alcohol and drugs affect the brain and body, which can affect the safety of my driving. Being under the influence of alcohol or drugs can affect my decision-making, behaviors, personal safety, and the safety of others. Driving while impaired (DWI) can occur if a person is driving, operating, or in physical control of a car, motorcycle, boat, snowmobile, ATV, motorbike, off-road vehicle, or any other motor vehicle ([place] Statute 169A.20). I understand the risk if I choose to drive or operate any vehicle or machinery.    I understand that if I do not follow any of the conditions above, my prescriptions or treatment may be stopped or changed.          Opioids  What You Need to Know    What are opioids?   Opioids are pain medicines that must be prescribed by a doctor. They are also known as narcotics.     Examples are:   morphine (MS Contin, Lindy)  oxycodone (Oxycontin)  oxycodone and acetaminophen (Percocet)  hydrocodone and acetaminophen (Vicodin, Norco)   fentanyl patch (Duragesic)   hydromorphone (Dilaudid)   methadone  codeine (Tylenol #3)     What do opioids do well?   Opioids are best for severe short-term pain such as after a surgery or injury. They may work well for cancer pain. They may help some people with long-lasting (chronic) pain.     What do opioids NOT do  well?   Opioids never get rid of pain entirely, and they don t work well for most patients with chronic pain. Opioids don t reduce swelling, one of the causes of pain.                                    Other ways to manage chronic pain and improve function include:     Treat the health problem that may be causing pain  Anti-inflammation medicines, which reduce swelling and tenderness, such as ibuprofen (Advil, Motrin) or naproxen (Aleve)  Acetaminophen (Tylenol)  Antidepressants and anti-seizure medicines, especially for nerve pain  Topical treatments such as patches or creams  Injections or nerve blocks  Chiropractic or osteopathic treatment  Acupuncture, massage, deep breathing, meditation, visual imagery, aromatherapy  Use heat or ice at the pain site  Physical therapy   Exercise  Stop smoking  Take part in therapy       Risks and side effects     Talk to your doctor before you start or decide to keep taking opioids. Possible side effects include:    Lowering your breathing rate enough to cause death  Overdose, including death, especially if taking higher than prescribed doses  Worse depression symptoms; less pleasure in things you usually enjoy  Feeling tired or sluggish  Slower thoughts or cloudy thinking  Being more sensitive to pain over time; pain is harder to control  Trouble sleeping or restless sleep  Changes in hormone levels (for example, less testosterone)  Changes in sex drive or ability to have sex  Constipation  Unsafe driving  Itching and sweating  Dizziness  Nausea, throwing up and dry mouth    What else should I know about opioids?    Opioids may lead to dependence, tolerance, or addiction.    Dependence means that if you stop or reduce the medicine too quickly, you will have withdrawal symptoms. These include loose poop (diarrhea), jitters, flu-like symptoms, nervousness and tremors. Dependence is not the same as addiction.                     Tolerance means needing higher doses over time to  get the same effect. This may increase the chance of serious side effects.    Addiction is when people improperly use a substance that harms their body, their mind or their relations with others. Use of opiates can cause a relapse of addiction if you have a history of drug or alcohol abuse.    People who have used opioids for a long time may have a lower quality of life, worse depression, higher levels of pain and more visits to doctors.    You can overdose on opioids. Take these steps to lower your risk of overdose:    Recognize the signs:  Signs of overdose include decrease or loss of consciousness (blackout), slowed breathing, trouble waking up and blue lips. If someone is worried about overdose, they should call 911.    Talk to your doctor about Narcan (naloxone).   If you are at risk for overdose, you may be given a prescription for Narcan. This medicine very quickly reverses the effects of opioids.   If you overdose, a friend or family member can give you Narcan while waiting for the ambulance. They need to know the signs of overdose and how to give Narcan.     Don't use alcohol or street drugs.   Taking them with opioids can cause death.    Do not take any of these medicines unless your doctor says it s OK. Taking these with opioids can cause death:  Benzodiazepines, such as lorazepam (Ativan), alprazolam (Xanax) or diazepam (Valium)  Muscle relaxers, such as cyclobenzaprine (Flexeril)  Sleeping pills like zolpidem (Ambien)   Other opioids      How to keep you and other people safe while taking opioids:    Never share your opioids with others.  Opioid medicines are regulated by the Drug Enforcement Agency (BAM). Selling or sharing medications is a criminal act.    2. Be sure to store opioids in a secure place, locked up if possible. Young children can easily swallow them and overdose.    3. When you are traveling with your medicines, keep them in the original bottles. If you use a pill box, be sure you also  carry a copy of your medicine list from your clinic or pharmacy.    4. Safe disposal of opioids    Most pharmacies have places to get rid of medicine, called disposal kiosks. Medicine disposal options are also available in every [facility]. Search your county and  medication disposal  to find more options. You can find more details at:  [email or web address]-medications     I agree that my provider, clinic care team, and pharmacy may work with any city, state or federal law enforcement agency that investigates the misuse, sale, or other diversion of my controlled medicine. I will allow my provider to discuss my care with, or share a copy of, this agreement with any other treating provider, pharmacy or emergency room where I receive care.    I have read this agreement and have asked questions about anything I did not understand.    _______________________________________________________  Patient Signature - Joel Pineda _____________________                   Date     _______________________________________________________  Provider Signature - Stacia Jack APRN CNP   _____________________                   Date     _______________________________________________________  Witness Signature (required if provider not present while patient signing)   _____________________                   Date

## 2024-03-01 ENCOUNTER — MYC REFILL (OUTPATIENT)
Dept: FAMILY MEDICINE | Facility: CLINIC | Age: 49
End: 2024-03-01
Payer: MEDICARE

## 2024-03-01 DIAGNOSIS — E53.8 B12 DEFICIENCY: ICD-10-CM

## 2024-03-01 RX ORDER — CYANOCOBALAMIN 1000 UG/ML
INJECTION, SOLUTION INTRAMUSCULAR; SUBCUTANEOUS
Qty: 10 ML | Refills: 0 | Status: SHIPPED | OUTPATIENT
Start: 2024-03-01

## 2024-03-04 ENCOUNTER — VIRTUAL VISIT (OUTPATIENT)
Dept: PALLIATIVE MEDICINE | Facility: CLINIC | Age: 49
End: 2024-03-04
Payer: MEDICARE

## 2024-03-04 ENCOUNTER — MYC MEDICAL ADVICE (OUTPATIENT)
Dept: PALLIATIVE MEDICINE | Facility: OTHER | Age: 49
End: 2024-03-04

## 2024-03-04 DIAGNOSIS — M45.8 ANKYLOSING SPONDYLITIS OF SACRAL REGION (H): ICD-10-CM

## 2024-03-04 DIAGNOSIS — M47.816 SPONDYLOSIS OF LUMBAR REGION WITHOUT MYELOPATHY OR RADICULOPATHY: ICD-10-CM

## 2024-03-04 DIAGNOSIS — G62.9 PERIPHERAL POLYNEUROPATHY: ICD-10-CM

## 2024-03-04 DIAGNOSIS — M51.369 DDD (DEGENERATIVE DISC DISEASE), LUMBAR: ICD-10-CM

## 2024-03-04 DIAGNOSIS — G43.111 INTRACTABLE MIGRAINE WITH AURA WITH STATUS MIGRAINOSUS: ICD-10-CM

## 2024-03-04 DIAGNOSIS — G89.29 CHRONIC INTRACTABLE PAIN: Primary | ICD-10-CM

## 2024-03-04 PROCEDURE — 96159 HLTH BHV IVNTJ INDIV EA ADDL: CPT | Mod: 95 | Performed by: PSYCHOLOGIST

## 2024-03-04 PROCEDURE — 96158 HLTH BHV IVNTJ INDIV 1ST 30: CPT | Mod: 95 | Performed by: PSYCHOLOGIST

## 2024-03-04 NOTE — PROGRESS NOTES
Joel Pineda is a 48 year old male who is being evaluated via a billable video visit.      Patient is currently in the [facility]? yes    Patient would like the video invitation sent by: Text to cell phone: [phone]}    Video Start Time: 3:00 PM  Video Stop Time: 3:53 PM    Additional provider notes:     Pain Diagnoses per pain provider:   Chronic intractable pain    Intractable migraine with aura with status migrainosus    DDD (degenerative disc disease), lumbar    Ankylosing spondylitis of sacral region (H)    Peripheral polyneuropathy    Spondylosis of lumbar region without myelopathy or radiculopathy        DATA: During today's visit you reported the following: Your joint, polyneuropathy and back pain is mildly improved. Migraine pain since 2/17 - looking out window and sun reflected off wing - 'like immediate ice pick in my eye'. Your mood is variable - felt very over-whelmed by pain, NONA stress. 'I feel like I'm spinning my wheels'. Your activity level is about the same. Your stress level is largely unchanged - NONA remains primary and consistent stressor. Your sleep is overall stable. You reported engaging in self-care for your pain 2-3 times daily.    You identified that you would like to focus on the following or had questions regarding the following issues or concerns, and we discussed the following:   - still dealing with migraine since last visit, started 2/17 while flying to [place]  - working with JOCELYN Zavala CNP to be referred to headache specialist  - continuing issues with NONA  - intensity has reduced with migraine  - trip to [place] was enjoyable overall despite migraine  - 'super over-whelmed' with pain for injection visit last week  - spent time with friend Saturday - at times it can be challenging to eat out with him  - enjoyed visit with mother yesterday  - feeling more fatigued  - PT credentialed in dry needling, hoping to get referral but somewhat nervous  - used  acupressure in the past, haven't for a while    ASSESSMENT: Tito reports he continues to have a migraine which started on 2/17 while traveling to [place]. He seems to be actively using skills to manage pain and mood.     PLAN:   Your next appointment is scheduled for 3/14 at 1:00 PM.  Assignment/Objectives /interventions for next session:   - use acupressure points for headaches  - continue to     We believe regular attendance is key to your success in our program!    Any time you are unable to keep your appointment we ask that you call us at [phone] at least 24 hours in advance to cancel.This will allow us to offer the appointment time to another patient.   Multiple missed appointments may lead to dismissal from the clinic.    Telemedicine Visit: The patient's condition can be safely assessed and treated via synchronous audio and visual telemedicine encounter.      Reason for Telemedicine Visit: Services only offered telehealth    Originating Site (Patient Location): Patient's home    Distant Site (Provider Location): Provider Remote Setting- Home Office    Consent:  The patient/guardian has verbally consented to: the potential risks and benefits of telemedicine (video visit) versus in person care; bill my insurance or make self-payment for services provided; and responsibility for payment of non-covered services.     Mode of Communication:  Video Conference via [employer]    As the provider I attest to compliance with applicable laws and regulations related to telemedicine.      Shirley Bartlett PsyD LP  Licensed Psychologist  Outpatient Clinic Therapist  [facility]

## 2024-03-08 ENCOUNTER — MYC MEDICAL ADVICE (OUTPATIENT)
Dept: FAMILY MEDICINE | Facility: CLINIC | Age: 49
End: 2024-03-08
Payer: MEDICARE

## 2024-03-08 DIAGNOSIS — I10 ESSENTIAL HYPERTENSION WITH GOAL BLOOD PRESSURE LESS THAN 140/90: Chronic | ICD-10-CM

## 2024-03-08 DIAGNOSIS — G43.109 MIGRAINE WITH AURA AND WITHOUT STATUS MIGRAINOSUS, NOT INTRACTABLE: ICD-10-CM

## 2024-03-08 DIAGNOSIS — R51.9 UNILATERAL HEADACHE: Primary | ICD-10-CM

## 2024-03-08 NOTE — TELEPHONE ENCOUNTER
Patient had visit on 2/29/2024 regarding headache and sinus symptoms.     Routing to provider to review and advise.    ESTEFANI Rosario  [facility] Primary Care Triage

## 2024-03-09 NOTE — TELEPHONE ENCOUNTER
I will place an order for CT scan of the brain to be performed which will look at his eyes, brain and sinuses.

## 2024-03-11 RX ORDER — RIZATRIPTAN BENZOATE 10 MG/1
10 TABLET ORAL
Qty: 30 TABLET | Refills: 0 | Status: SHIPPED | OUTPATIENT
Start: 2024-03-11 | End: 2024-05-07

## 2024-03-11 RX ORDER — RAMIPRIL 10 MG/1
CAPSULE ORAL
Qty: 90 CAPSULE | Refills: 0 | Status: SHIPPED | OUTPATIENT
Start: 2024-03-11 | End: 2024-06-10

## 2024-03-14 ENCOUNTER — VIRTUAL VISIT (OUTPATIENT)
Dept: PALLIATIVE MEDICINE | Facility: CLINIC | Age: 49
End: 2024-03-14
Payer: MEDICARE

## 2024-03-14 DIAGNOSIS — G62.9 PERIPHERAL POLYNEUROPATHY: ICD-10-CM

## 2024-03-14 DIAGNOSIS — M51.369 DDD (DEGENERATIVE DISC DISEASE), LUMBAR: ICD-10-CM

## 2024-03-14 DIAGNOSIS — M47.816 SPONDYLOSIS OF LUMBAR REGION WITHOUT MYELOPATHY OR RADICULOPATHY: ICD-10-CM

## 2024-03-14 DIAGNOSIS — G43.111 INTRACTABLE MIGRAINE WITH AURA WITH STATUS MIGRAINOSUS: ICD-10-CM

## 2024-03-14 DIAGNOSIS — G89.29 CHRONIC INTRACTABLE PAIN: Primary | ICD-10-CM

## 2024-03-14 DIAGNOSIS — M45.8 ANKYLOSING SPONDYLITIS OF SACRAL REGION (H): ICD-10-CM

## 2024-03-14 PROCEDURE — 96159 HLTH BHV IVNTJ INDIV EA ADDL: CPT | Mod: 95 | Performed by: PSYCHOLOGIST

## 2024-03-14 PROCEDURE — 96158 HLTH BHV IVNTJ INDIV 1ST 30: CPT | Mod: 95 | Performed by: PSYCHOLOGIST

## 2024-03-14 NOTE — PROGRESS NOTES
Joel Pineda is a 48 year old male who is being evaluated via a billable video visit.      Patient is currently in the [facility]? yes    Patient would like the video invitation sent by: Text to cell phone: [phone]}    Video Start Time: 1:00 PM  Video Stop Time: 1:39 PM    Additional provider notes:     Pain Diagnoses per pain provider:   Chronic intractable pain     Intractable migraine with aura with status migrainosus     DDD (degenerative disc disease), lumbar     Ankylosing spondylitis of sacral region (H)     Peripheral polyneuropathy     Spondylosis of lumbar region without myelopathy or radiculopathy        DATA: During today's visit you reported the following: Your back pain and polyneuropathy is about the same, somewhat overshadowed by migraine pain which has persisted. You report you haven't really been attuned to mood - report you either don't want to or don't know how to access yourself emotionally right now. Report all you feel is pain when trying to connect to yourself. Your activity level is unchanged. Your stress level is higher than you'd prefer - primary triggers related to NONA. Your sleep is 'pretty good'. You reported engaging in self-care for your pain 1-3 times daily.    You identified that you would like to focus on the following or had questions regarding the following issues or concerns, and we discussed the following:   - continuing to manage tasks for NONA  - migraine pain is 'pretty wicked' today  - first visit for dry needling yesterday - you feel your expectations were too high  - new patient visit at [facility] in next 2 weeks  - CT scan next week  - discussed checking in with psychiatrist if you continue to experience emotional blunting or numbing, as this could be due to Risperidone which you have been taking more often - on average 4 times per week   - feel you may be eating more in the afternoons, wonder if you are 'eating' your feelings  -  visited mother and step-father - takes effect to get yourself ready to go    ASSESSMENT: Tito continues to have a migraine since his visit to [place]. He reports increased use of both Maxalt and Risperidone due to pain and feeling agitated; he is struggling to identify emotional state as a result; discussed increased use of Risperidone could be contributing, as could high pain related to migraines.    PLAN:   Your next appointment is scheduled for 3/26 at 3:00 PM.  Assignment/Objectives /interventions for next session:   - continue to focus on self-care/self-comfort  - focus on eating and staying hydrated, sleep hygiene and addressing stressors as able    We believe regular attendance is key to your success in our program!    Any time you are unable to keep your appointment we ask that you call us at [phone] at least 24 hours in advance to cancel.This will allow us to offer the appointment time to another patient.   Multiple missed appointments may lead to dismissal from the clinic.    Telemedicine Visit: The patient's condition can be safely assessed and treated via synchronous audio and visual telemedicine encounter.      Reason for Telemedicine Visit: Services only offered telehealth    Originating Site (Patient Location): Patient's home    Distant Site (Provider Location): Provider Remote Setting- Home Office    Consent:  The patient/guardian has verbally consented to: the potential risks and benefits of telemedicine (video visit) versus in person care; bill my insurance or make self-payment for services provided; and responsibility for payment of non-covered services.     Mode of Communication:  Video Conference via [employer]    As the provider I attest to compliance with applicable laws and regulations related to telemedicine.      Shirley Bartlett PsyD LP  Licensed Psychologist  Outpatient Clinic Therapist  [facility]

## 2024-03-15 ENCOUNTER — NURSE TRIAGE (OUTPATIENT)
Dept: NURSING | Facility: CLINIC | Age: 49
End: 2024-03-15
Payer: MEDICARE

## 2024-03-15 NOTE — TELEPHONE ENCOUNTER
Called and spoke with patient. Notified him of provider's response below. Patient is agreeable to being seen at [facility] today. Called [facility] and spoke with Dr. Cavazos, who stated that patient should be seen in the emergency room instead to have an MRI done to rule out a minor stroke or bleeding.     Called patient back and left VM to return call to clinic at [phone]     If patient calls back, please notify patient that Dr. Cavazos at [facility] stated that patient should be seen in the emergency room as soon as possible today instead so he is able to have an MRI done as they are no longer able to do this today at [facility].      Jing Ortega, RN

## 2024-03-15 NOTE — TELEPHONE ENCOUNTER
The ADS should be perfect for an acute migraine.  They can give him injectable therapy, antinausea, etc.

## 2024-03-15 NOTE — TELEPHONE ENCOUNTER
"Nurse Triage SBAR    Is this a 2nd Level Triage? YES, LICENSED PRACTITIONER REVIEW IS REQUIRED    Situation: Patient calling with new symptoms that he feels are related to the migraine he has had for the last month. Two days ago he vomited x1 and yesterday and today patient has had a moment of stuttering during conversation. Also states that vision is blurrier today Consent: not needed    Background: Patient states he has had a migraine since Feb 17th.   Has seen ophthalmologist, pain medicine, and evist with PCP. Also has an upcoming appointment with a neurologist.   CT is scheduled for Tues and Topamax will be titrated to full dose tomorrow  2 days ago - vomited x1  Finished doxycycline for sinus pain  Took 3 Maxalt yesterday, none today    Assessment:   Migraine pain - 4/10, worst is 7-8/10  Stuttering episode x2  No slurred speech  Mild difficulty with verbal comprehension during therapy yesterday  Right eye orbital \"aura\"- denies pain   Blurry vision    Protocol Recommended Disposition:   Go To ED/UCC Now (Or To Office With PCP Approval)    Recommendation: Advised patient to wait for call-back after routing message to provider. Care advice given including when to call back. Patient verbalized understanding and agreed with plan.     Routing to provider to review and advise.    Does the patient meet one of the following criteria for ADS visit consideration? 16+ years old, with an [facility] PCP     TIP  Providers, please consider if this condition is appropriate for management at one of our Acute and Diagnostic Services sites.     If patient is a good candidate, please use dotphrase <dot>triageresponse and select Refer to ADS to document.     Rosalind Marvin RN [facility] 3/15/2024 12:19 PM    Reason for Disposition   Loss of vision or double vision  (Exception: Same as prior migraines.)    Additional Information   Negative: Difficult to awaken or acting confused (e.g., disoriented, slurred speech)   " Negative: Weakness of the face, arm or leg on one side of the body and new-onset   Negative: Numbness of the face, arm or leg on one side of the body and new-onset   Negative: Loss of speech or garbled speech and new-onset   Negative: Passed out (i.e., fainted, collapsed and was not responding)   Negative: Sounds like a life-threatening emergency to the triager   Negative: Followed a head injury within last 3 days   Negative: Traumatic Brain Injury (TBI) is suspected   Negative: Sinus pain of forehead and yellow or green nasal discharge   Negative: Pregnant   Negative: Unable to walk without falling   Negative: Stiff neck (can't touch chin to chest)   Negative: Possibility of carbon monoxide exposure   Negative: SEVERE headache, sudden-onset (i.e., reaching maximum intensity within seconds to 1 hour)   Negative: Severe pain in one eye   Negative: SEVERE headache, states 'worst headache' of life    Protocols used: Headache-A-[place]

## 2024-03-15 NOTE — TELEPHONE ENCOUNTER
Patient called back. Notified him of provider's response below. He verbalized understanding & will plan on going to [facility] in [place] as soon as he is able today. Patient had no questions/concerns at this time.     Jing Ortega, JAVONN, RN   [facility]

## 2024-03-19 ASSESSMENT — ANXIETY QUESTIONNAIRES
GAD7 TOTAL SCORE: 10
6. BECOMING EASILY ANNOYED OR IRRITABLE: MORE THAN HALF THE DAYS
3. WORRYING TOO MUCH ABOUT DIFFERENT THINGS: MORE THAN HALF THE DAYS
1. FEELING NERVOUS, ANXIOUS, OR ON EDGE: SEVERAL DAYS
2. NOT BEING ABLE TO STOP OR CONTROL WORRYING: SEVERAL DAYS
5. BEING SO RESTLESS THAT IT IS HARD TO SIT STILL: SEVERAL DAYS
7. FEELING AFRAID AS IF SOMETHING AWFUL MIGHT HAPPEN: MORE THAN HALF THE DAYS
4. TROUBLE RELAXING: SEVERAL DAYS
IF YOU CHECKED OFF ANY PROBLEMS ON THIS QUESTIONNAIRE, HOW DIFFICULT HAVE THESE PROBLEMS MADE IT FOR YOU TO DO YOUR WORK, TAKE CARE OF THINGS AT HOME, OR GET ALONG WITH OTHER PEOPLE: SOMEWHAT DIFFICULT
7. FEELING AFRAID AS IF SOMETHING AWFUL MIGHT HAPPEN: MORE THAN HALF THE DAYS
8. IF YOU CHECKED OFF ANY PROBLEMS, HOW DIFFICULT HAVE THESE MADE IT FOR YOU TO DO YOUR WORK, TAKE CARE OF THINGS AT HOME, OR GET ALONG WITH OTHER PEOPLE?: SOMEWHAT DIFFICULT
GAD7 TOTAL SCORE: 10

## 2024-03-22 DIAGNOSIS — E03.9 ACQUIRED HYPOTHYROIDISM: ICD-10-CM

## 2024-03-25 RX ORDER — LEVOTHYROXINE SODIUM 75 UG/1
TABLET ORAL
Qty: 90 TABLET | Refills: 2 | Status: SHIPPED | OUTPATIENT
Start: 2024-03-25

## 2024-03-26 ENCOUNTER — VIRTUAL VISIT (OUTPATIENT)
Dept: PALLIATIVE MEDICINE | Facility: CLINIC | Age: 49
End: 2024-03-26
Payer: MEDICARE

## 2024-03-26 DIAGNOSIS — G43.111 INTRACTABLE MIGRAINE WITH AURA WITH STATUS MIGRAINOSUS: ICD-10-CM

## 2024-03-26 DIAGNOSIS — G62.9 PERIPHERAL POLYNEUROPATHY: ICD-10-CM

## 2024-03-26 DIAGNOSIS — M45.8 ANKYLOSING SPONDYLITIS OF SACRAL REGION (H): ICD-10-CM

## 2024-03-26 DIAGNOSIS — M51.369 DDD (DEGENERATIVE DISC DISEASE), LUMBAR: ICD-10-CM

## 2024-03-26 DIAGNOSIS — M47.816 SPONDYLOSIS OF LUMBAR REGION WITHOUT MYELOPATHY OR RADICULOPATHY: ICD-10-CM

## 2024-03-26 DIAGNOSIS — G89.29 CHRONIC INTRACTABLE PAIN: Primary | ICD-10-CM

## 2024-03-26 PROCEDURE — 96159 HLTH BHV IVNTJ INDIV EA ADDL: CPT | Mod: 95 | Performed by: PSYCHOLOGIST

## 2024-03-26 PROCEDURE — 96158 HLTH BHV IVNTJ INDIV 1ST 30: CPT | Mod: 95 | Performed by: PSYCHOLOGIST

## 2024-03-26 NOTE — PROGRESS NOTES
Joel Pineda is a 48 year old male who is being evaluated via a billable video visit.      Patient is currently in the [facility]? yes    Patient would like the video invitation sent by: Text to cell phone: [phone]}    Video Start Time: 3:01 PM  Video Stop Time: 3:56 PM    Additional provider notes:     Pain Diagnoses per pain provider:   Chronic intractable pain     Intractable migraine with aura with status migrainosus     DDD (degenerative disc disease), lumbar     Ankylosing spondylitis of sacral region (H)     Peripheral polyneuropathy     Spondylosis of lumbar region without myelopathy or radiculopathy          DATA: During today's visit you reported the following: Your chronic pain is worse, somewhat overshadowed by ongoing migraine. Your mood is frustrated by interactions with step-father and father, recognize how pain is negatively impacting your window of tolerance. Your activity level is unchanged. Your stress level is still high - NONA, ongoing headaches/migraines, relationships with father and step-father. Your sleep is unchanged. You reported engaging in self-care for your pain 1-2 times daily - note feeling more impatient when not experiencing immediate relief from tried and true methods of self-soothing - discussed pulling out DBT binder and exploring distress tolerance and emotion regulation skills.    You identified that you would like to focus on the following or had questions regarding the following issues or concerns, and we discussed the following:   - ED visit for ongoing migraine symptoms  - trying to self-titrate opiates, believe it was recommendation from ED in terms of potential rebound headaches - discussed working with neurology and JOCELYN Zavala, CNP on this  - on-going issues related to NONA management, still vetting new management companies  - feeling impatient with self-soothing not providing immediate relief  - discussed you may benefit from returning to  individual therapy - explored some ways to resource this    ASSESSMENT: Tito continues to have migraine pain that varies from 3-7/10 coupled with increase in chronic pain. Mood is worse due to recent frustrating conversations with both father and step-father.     PLAN:   Your next appointment is scheduled for 4/9 at 3:00 PM.  Assignment/Objectives /interventions for next session:   - explore DBT binder for distress tolerance and emotion regulation skills  - contact ACP, [place] or talk to psychiatrist about recommendations if he has any for individual therapy    We believe regular attendance is key to your success in our program!    Any time you are unable to keep your appointment we ask that you call us at [phone] at least 24 hours in advance to cancel.This will allow us to offer the appointment time to another patient.   Multiple missed appointments may lead to dismissal from the clinic.    Telemedicine Visit: The patient's condition can be safely assessed and treated via synchronous audio and visual telemedicine encounter.      Reason for Telemedicine Visit: Services only offered telehealth    Originating Site (Patient Location): Patient's home    Distant Site (Provider Location): Provider Remote Setting- Home Office    Consent:  The patient/guardian has verbally consented to: the potential risks and benefits of telemedicine (video visit) versus in person care; bill my insurance or make self-payment for services provided; and responsibility for payment of non-covered services.     Mode of Communication:  Video Conference via [employer]    As the provider I attest to compliance with applicable laws and regulations related to telemedicine.      Shirley Bartlett PsyD LP  Licensed Psychologist  Outpatient Clinic Therapist  [facility]

## 2024-03-27 ENCOUNTER — MYC MEDICAL ADVICE (OUTPATIENT)
Dept: FAMILY MEDICINE | Facility: CLINIC | Age: 49
End: 2024-03-27
Payer: MEDICARE

## 2024-03-28 ENCOUNTER — NURSE TRIAGE (OUTPATIENT)
Dept: FAMILY MEDICINE | Facility: CLINIC | Age: 49
End: 2024-03-28
Payer: MEDICARE

## 2024-03-28 NOTE — TELEPHONE ENCOUNTER
Please triage. Any other new neurologic concerns?  When did double vision start and is it constant and is it constant?   Any new headache or other vision changes? Ever have this before?

## 2024-03-28 NOTE — TELEPHONE ENCOUNTER
As long as no new headache or other new onset neuro concerns ok to see neuro tomorrow for eval as planned.     Should go to ER if new headache (especially if sudden onset) or new neuro symptoms start

## 2024-03-28 NOTE — TELEPHONE ENCOUNTER
"Calling patient to triage per provider recommendation:             Patient states he has no new neurological issues other than the double vision that started last night. Is intermittent. States he has had chronic daily headaches since Feb. Of 2017 and had figured it is the new medication causing it as he was just in the hospital on 3/15, had a CT scan and nothing abnormal found in the ER. Patient states \"Its no problem, I can stay on 15mg another 24 hours, as my appointment is tomorrow and it's really close down to the wire to see neurology tomorrow anyhow\". No other new concerns at this time.    Disposition states to be seen in ER due to double vision / blurred vision. 2nd level triaging and routing to provider to review and advise based off of specific situation to patient as he has diagnosis' and is currently working with PCP for headaches.    Aj Stiles RN  [facility]    Reason for Disposition   Double vision   Blurred vision or visual changes and present now and sudden onset or new (e.g., minutes, hours, days)  (Exception: Seeing floaters / black specks OR previously diagnosed migraine headaches with same symptoms.)    Additional Information   Negative: Weakness of the face, arm or leg on one side of the body   Negative: Followed getting substance in the eye   Negative: Foreign body stuck in the eye   Negative: Followed an eye injury   Negative: Followed sun lamp or sun exposure (UV keratitis)   Negative: Yellow or green discharge (pus) in the eye   Negative: Pregnant   Negative: Complete loss of vision in one or both eyes   Negative: SEVERE eye pain   Negative: SEVERE headache    Protocols used: Vision Loss or Change-A-OH    "

## 2024-03-28 NOTE — TELEPHONE ENCOUNTER
Writer called Tito and relayed provider message below as written. Patient verbalized understanding and stated the last time he fell was 6 months ago after nerve ablation - Hour or 2 after that procedure - Right now - he took 5mg oxy - lowering pain med to see if it would reduce chance of rebound headache, but back was hurting, so he can't drive right now but if something else happens he will call 911 to go to the ER immediately.    Aj Stiles RN  [facility]

## 2024-03-28 NOTE — TELEPHONE ENCOUNTER
"Writer called patient to relay provider message below as written. Patient verbalizes understanding but also states he wanted to include the following that he forgot to add during the previous call:    \"Here's one I forgot about. This morning I was out in my garage to get some bubbly water and I had fell forward. I didn't hit my head as I had fallen on a pile of stuff in front of me, so I didn't hit the ground. I had my cat in my arms, and I just mary fell forward\".    Upon questioning patient, he is not quite sure if his leg gave out, or tripped. Just states that he wanted to at least include this in the notes of what had happened.     Writer stated to patient that would add notes and route to same provider and we would call patient if any new recommendations.    Routing to provider to review and advise to stick with same plan or If any further recommendations.    Aj Stiles RN  [facility]    "

## 2024-03-29 ENCOUNTER — TRANSFERRED RECORDS (OUTPATIENT)
Dept: HEALTH INFORMATION MANAGEMENT | Facility: CLINIC | Age: 49
End: 2024-03-29

## 2024-03-30 ENCOUNTER — MYC MEDICAL ADVICE (OUTPATIENT)
Dept: PALLIATIVE MEDICINE | Facility: OTHER | Age: 49
End: 2024-03-30
Payer: MEDICARE

## 2024-04-01 ENCOUNTER — TELEPHONE (OUTPATIENT)
Dept: SLEEP MEDICINE | Facility: CLINIC | Age: 49
End: 2024-04-01

## 2024-04-01 ASSESSMENT — PAIN SCALES - PAIN ENJOYMENT GENERAL ACTIVITY SCALE (PEG)
INTERFERED_ENJOYMENT_LIFE: 6
AVG_PAIN_PASTWEEK: 6
INTERFERED_GENERAL_ACTIVITY: 6
PEG_TOTALSCORE: 6

## 2024-04-01 NOTE — TELEPHONE ENCOUNTER
Reason for Call:  Appointment Request    Patient requesting this type of appt:  Sleep follow up     Requested provider:  Dr. Damico    Reason patient unable to be scheduled:  time frame     When does patient want to be seen/preferred time:as soon as possible    Comments: Patient is requesting a return sleep appointment with Dr. Damico before 9/25. Patient is requesting for Dr. Samuel at the [facility] of neurology     Could we send this information to you in [facility] or would you prefer to receive a phone call?:   Patient would prefer a phone call   Okay to leave a detailed message?: Yes at Cell number on file:    Telephone Information:   Mobile [phone]       Call taken on 4/1/2024 at 4:18 PM by Arturo Valencia

## 2024-04-06 ASSESSMENT — ANXIETY QUESTIONNAIRES
6. BECOMING EASILY ANNOYED OR IRRITABLE: MORE THAN HALF THE DAYS
GAD7 TOTAL SCORE: 9
3. WORRYING TOO MUCH ABOUT DIFFERENT THINGS: SEVERAL DAYS
4. TROUBLE RELAXING: SEVERAL DAYS
IF YOU CHECKED OFF ANY PROBLEMS ON THIS QUESTIONNAIRE, HOW DIFFICULT HAVE THESE PROBLEMS MADE IT FOR YOU TO DO YOUR WORK, TAKE CARE OF THINGS AT HOME, OR GET ALONG WITH OTHER PEOPLE: SOMEWHAT DIFFICULT
8. IF YOU CHECKED OFF ANY PROBLEMS, HOW DIFFICULT HAVE THESE MADE IT FOR YOU TO DO YOUR WORK, TAKE CARE OF THINGS AT HOME, OR GET ALONG WITH OTHER PEOPLE?: SOMEWHAT DIFFICULT
7. FEELING AFRAID AS IF SOMETHING AWFUL MIGHT HAPPEN: SEVERAL DAYS
2. NOT BEING ABLE TO STOP OR CONTROL WORRYING: SEVERAL DAYS
5. BEING SO RESTLESS THAT IT IS HARD TO SIT STILL: SEVERAL DAYS
GAD7 TOTAL SCORE: 9
7. FEELING AFRAID AS IF SOMETHING AWFUL MIGHT HAPPEN: SEVERAL DAYS
1. FEELING NERVOUS, ANXIOUS, OR ON EDGE: MORE THAN HALF THE DAYS

## 2024-04-07 NOTE — PROGRESS NOTES
[facility]    CHIEF COMPLAINT: Chronic Pain.    INTERVAL HISTORY:  Last seen on 1/19/2024.       Recommendations/plan at the last visit included:  Health Psychology: YES Continue per Dr Bartlett's recommendations  Physical therapy: NO Continue home exercise program   30 minutes Video or Clinic follow-up with JOCELYN Zavala NP-C in 2 months ot sooner as needed . Ok to schedule up to three follow up appts at a time, alternating clinic and video visits.   Procedures recommended:   Right SI Joint ordered.   Trigger point injection for neck, traps, upper back.  Medication Management :   Prep for contrast allergy:   Take Medrol 32 mg 12 hours before the injection appt time,  Take Medrol 32 mg 2 hours before procedure,    Finally, take Benedryl 50 mg 1 hour prior to procedure  If you have any concerns about how these medications     Since last visit:   - Saw [facility], discussed Nurtec and PA was sent in.  Was told to stop tylenol and nabumetone to prevent the possible rebound HA. Scheduled to have botox on Friday. Taking one Tylenol 500 mg,   - Was seen in ED on 3/15/24 and for intractable migraine and on 3/30/24 for abdominal pain after falling in his garage, fall was on 3/28/24. The abdominal pain is much better. Headache is 4/10.   - Continues to work with Dr Bartlett in Pain Psychology. Reports many sources of increased stress. Dr Bartlett has recommended that he find an outside therapist for   - Scheduled to see Speech Therapy ordered by [facility] Dr Hua and PT for head/neck by [facility]. Would like to also start PT for lumbar pain as well.   - 2/6/24:  Bilateral SI joint injection is only mildly effective, does not wish to repeat.   - Lumbar pain is exacerbated since 3/28/24 fall.     Pain Information today: Moderate Pain (5)/10. Location of pain: lumbar and headache.    Annual Requirements last collected:       Current Pain Relevant Medications:     Acetaminophen 500 mg BID & with Oxycodone.   Cymbalta 120 mg in AM  Lyrica 300 MG BID   Methocarbamol 500 mg 1-2 QID PRN  Nabumetone 500 mg 1-2 times a day  Lidocaine gel topically 2-3 times daily  Risperdal 0.5 mg 1-2 x daily      Pain Related Controlled Substances:   Clonazepam 0.5 mg, 2 tabs at HS.  Lunesta 3 mg  Oxycodone 5 mg 1-2 tabs per day PRN              Total opiate dose: 7.5-15 MME     Previous Pain Relevant Medications: (H--helped; HI--Helped initially; [facility]--Somewhat helpful; NH--No help; W--worse; SE--side effects; ?--Unsure if helpful)   NOTE: This medication information taken from patient's intake form, not medical records.   Opiates: Oxycodone: H, Tramadol:[facility]. SE, Codeine: NH  NSAIDS: Celebrex: [facility], Nabumetone:H, Naproxen: SE  Anti-migraine medications: Midrin? , Maxalt:H  Muscle Relaxants: Baclofen:[facility], Flexeril:H, Tizaidine:?, Methocarbamol:?  Neuropathics: Lyrica:H  Anti-depressants: Abilify:SE, Brintellix: NH, Wellbutrin: ?, Cymbalta: NH, Nortriptyline: NH, Seroquel:   [facility], Risperdal: NH, Effexor:?, Cymbalta ?  Anxiety medications: Xanax: H, Klonpin: H, lorazepam: H      Topicals: Lidocaine:H  Sleep medications:Belsomra: NH, Melatonin:H, Trazodone NH, Ambien:NH  Other medications not covered above: Humira: All, Enbrel; H, dicyclomine:H, Medrol:[facility], Prednisone:H     Any illicit drug use: denies   EtOH use: none   Caffeine use: 6-8 per day   Nicotine use: None     Past Pain Treatments:   Pain Clinic:   Yes  [facility] pain management: For spinal injections with Dr Diaz, [facility]: injections, nerve ablation, [facility] for SCS treatment.  Did trial but did not find it beneficial.   [facility] chronic pain program.    PT: Yes  For other reasons. Neck, back and feet  Psychologist: Yes ongoing with private therapist.at  [facility].   Chiropractor: Yes years ag              Acupuncture: Yes NH  Pharmacotherapy:  Opioids: Yes  Non-opioids:    Yes   TENs Unit:Yes H for back   Injections: Yes Many for neck  and back over the years.      Surgeries related to pain: Yes   October 2007 L5-S1 laminectomy, micro discectomy          THE 4 As OF OPIOID MAINTENANCE ANALGESIA    Analgesia: Is pain relief clinically significant? YES   Activity: Is patient functional and able to perform Activities of Daily Living? YES   Adverse effects: Is patient free from adverse side effects from opiates? YES   Adherence to Rx protocol: Is patient adhering to Controlled Substance Agreement and taking medications ONLY as ordered? YES     Is Narcan prescribed for opiate use >50 MME daily or concurrent use of opiates and benzodiazepines?  Yes    [place] Board of Pharmacy Data Base Reviewed:    YES; No concern for abuse or misuse of controlled medications based on this report. Reviewed [facility] April 7, 2024- no concerning fills.      PHYSICAL EXAM    Vitals:    04/08/24 1408   BP: 114/67   Pulse: 75   SpO2: 100%   Weight: 127.9 kg (282 lb)       Constitutional: healthy, alert, and moderate distress A&O.   Patient is appropriate.  Psychiatric/mental status: Alert, without lethargy or stupor. Appropriate affect.     Neurologic exam:  CN:  Cranial nerves 2-12 are grossly normal.    MUSCULOSKELETAL:  Deferred at this appt.     DIRE Score for ongoing opioid management is calculated as follows:    Diagnosis = 3 pts (advanced condition; severe pain/objective findings)    Intractability = 3 pts (patient fully engaged but inadequate response to treatments)    Risk        Psych = 3 pts (no significant personality dysfunction/mental illness; good communication with clinic)         Chem Hlth = 3 pts (no history of chemical dependency; not drug-focused)       Reliability = 2 pts (occasional difficulties with compliance; generally reliable)       Social = 2 pts (reduction in some relationships/life rolls)       (Psych + Chem hlth + Reliability + Social) = 16    Efficacy = 2 pts (moderate benefit/function; low med dose; too early/not tried meds)    DIRE Score =  18        7-13: likely NOT suitable candidate for long-term opioid analgesia       14-21: may be a suitable candidate for long-term opioid analgesia     Previous Diagnostic Tests:   Imaging Studies:   MR LUMBAR SPINE W/O CONTRAST 6/27/2019  Impression:   1. Multilevel lumbar spondylosis, most pronounced at L5-S1 with  moderate to severe left and moderate right neural foraminal stenosis as well as narrowing of the left lateral recess and abutment the traversing left S1 nerve root.   2. Additional multilevel degenerative changes of the lumbar spine as described above.     PAIN RELEVANT CONDITIONS:   1.  Postherpetic neuralgia  2.  Ankylosing spondylosis, Lumbar DDD with left S1 nerve involvement, lumbar stenosis  3.  Chronic migraine headaches    DIAGNOSIS AND PLAN:     (G89.29) Chronic intractable pain  (primary encounter diagnosis)  (G43.111) Intractable migraine with aura with status migrainosus  (M51.36) DDD (degenerative disc disease), lumbar  (M45.8) Ankylosing spondylitis of sacral region (H)  (G62.9) Peripheral polyneuropathy  (M47.816) Spondylosis of lumbar region without myelopathy or radiculopathy  (M54.9) Acute back pain less than 4 weeks duration  Comment: Tito has been dealing with a migraine for several weeks. He cannot recall if the ED medications helped the migraine symptoms. Low back has been exacerbated significantly since fall on 3/28/24. Toradol 30 mg IM given in clinic to help with migraine and low back pain, also prescribed Percocet 5/325 mg for acute on chronic pain.   Requesting order for PT which is provided.   Plan: oxyCODONE-acetaminophen (PERCOCET) 5-325 MG tablet  oxyCODONE (ROXICODONE) 5 MG tablet,  ketorolac (TORADOL) injection 30 mg   Physical Therapy  Referral    (Z79.891) Long term (current) use of opiate analgesic  Comment: Annual requirements   Plan: Drug Confirmation Panel Urine with Creat,         Ethanol urine    PATIENT INSTRUCTIONS:     Diagnosis reviewed, treatment  option addressed, and risk/benefits discussed.  Self-care instructions given.  I am recommending a multidisciplinary treatment plan to help this patient better manage pain.    Remember to request ALL medication refills 5 BUSINESS days before you run out.     Health Psychology: YES Per Shirley's recommendations  Physical therapy: YES [facility] will fax an order for Orthology for low back pain   30 minutes Video or Clinic follow-up with JOCELYN Zavala, NPLeydaC in 2 weeks. Also schedule 2 month video or clinic follow up.   Ok to schedule up to three follow up appts at a time, alternating clinic and video visits.   Labs: Annual UDS/CSA  Medication Management :   Percocet 5/325 m tablet up to three times daily in addition to the chronic Oxycodone.   Toradol 30 mg: IM injection given in clinic.   STOP Tylenol for now.   Take Methocarbamol 500 mg 1-2 tabs up to 4 times per day.       I have reviewed the note as documented above.  This accurately captures the substance of my conversation with the patient.  A total of 49 minutes of preparation, care, and consultation were spent on this visit today.     JOCELYN Padgett, NP-C  [facility]    (Information in italics and blue color are taken from previous pain and consulting medical providers notes and are documented as such)

## 2024-04-08 ENCOUNTER — OFFICE VISIT (OUTPATIENT)
Dept: PALLIATIVE MEDICINE | Facility: OTHER | Age: 49
End: 2024-04-08
Attending: NURSE PRACTITIONER
Payer: MEDICARE

## 2024-04-08 VITALS
HEART RATE: 75 BPM | BODY MASS INDEX: 34.33 KG/M2 | OXYGEN SATURATION: 100 % | SYSTOLIC BLOOD PRESSURE: 114 MMHG | DIASTOLIC BLOOD PRESSURE: 67 MMHG | WEIGHT: 282 LBS

## 2024-04-08 DIAGNOSIS — G62.9 PERIPHERAL POLYNEUROPATHY: ICD-10-CM

## 2024-04-08 DIAGNOSIS — M54.9 ACUTE BACK PAIN LESS THAN 4 WEEKS DURATION: ICD-10-CM

## 2024-04-08 DIAGNOSIS — M45.8 ANKYLOSING SPONDYLITIS OF SACRAL REGION (H): ICD-10-CM

## 2024-04-08 DIAGNOSIS — M47.816 SPONDYLOSIS OF LUMBAR REGION WITHOUT MYELOPATHY OR RADICULOPATHY: ICD-10-CM

## 2024-04-08 DIAGNOSIS — M51.369 DDD (DEGENERATIVE DISC DISEASE), LUMBAR: ICD-10-CM

## 2024-04-08 DIAGNOSIS — G43.111 INTRACTABLE MIGRAINE WITH AURA WITH STATUS MIGRAINOSUS: ICD-10-CM

## 2024-04-08 DIAGNOSIS — Z79.891 LONG TERM (CURRENT) USE OF OPIATE ANALGESIC: ICD-10-CM

## 2024-04-08 DIAGNOSIS — G89.29 CHRONIC INTRACTABLE PAIN: Primary | ICD-10-CM

## 2024-04-08 LAB
CREAT UR-MCNC: 50 MG/DL
ETHANOL UR QL SCN: NORMAL

## 2024-04-08 PROCEDURE — 99215 OFFICE O/P EST HI 40 MIN: CPT | Performed by: NURSE PRACTITIONER

## 2024-04-08 PROCEDURE — 80307 DRUG TEST PRSMV CHEM ANLYZR: CPT | Performed by: NURSE PRACTITIONER

## 2024-04-08 PROCEDURE — 96372 THER/PROPH/DIAG INJ SC/IM: CPT | Performed by: NURSE PRACTITIONER

## 2024-04-08 PROCEDURE — G0463 HOSPITAL OUTPT CLINIC VISIT: HCPCS | Mod: 25 | Performed by: NURSE PRACTITIONER

## 2024-04-08 PROCEDURE — 80360 METHYLPHENIDATE: CPT | Performed by: NURSE PRACTITIONER

## 2024-04-08 PROCEDURE — G2211 COMPLEX E/M VISIT ADD ON: HCPCS | Performed by: NURSE PRACTITIONER

## 2024-04-08 PROCEDURE — 250N000011 HC RX IP 250 OP 636: Performed by: NURSE PRACTITIONER

## 2024-04-08 PROCEDURE — 80367 DRUG SCREENING PROPOXYPHENE: CPT | Performed by: NURSE PRACTITIONER

## 2024-04-08 RX ORDER — EPINEPHRINE 0.15 MG/.15ML
0.15 INJECTION SUBCUTANEOUS PRN
COMMUNITY
End: 2024-07-25

## 2024-04-08 RX ORDER — KETOROLAC TROMETHAMINE 30 MG/ML
30 INJECTION, SOLUTION INTRAMUSCULAR; INTRAVENOUS ONCE
Status: COMPLETED | OUTPATIENT
Start: 2024-04-08 | End: 2024-04-08

## 2024-04-08 RX ORDER — OXYCODONE AND ACETAMINOPHEN 5; 325 MG/1; MG/1
1 TABLET ORAL 3 TIMES DAILY PRN
Qty: 21 TABLET | Refills: 0 | Status: SHIPPED | OUTPATIENT
Start: 2024-04-08 | End: 2024-04-15

## 2024-04-08 RX ORDER — OXYCODONE HYDROCHLORIDE 5 MG/1
5 TABLET ORAL 2 TIMES DAILY PRN
Qty: 60 TABLET | Refills: 0 | Status: SHIPPED | OUTPATIENT
Start: 2024-04-08 | End: 2024-05-20

## 2024-04-08 RX ADMIN — KETOROLAC TROMETHAMINE 30 MG: 30 INJECTION, SOLUTION INTRAMUSCULAR at 15:38

## 2024-04-08 ASSESSMENT — PAIN SCALES - GENERAL: PAINLEVEL: MODERATE PAIN (5)

## 2024-04-08 NOTE — LETTER

## 2024-04-08 NOTE — PROGRESS NOTES
Patient presents to the clinic today for a visit with JOCELYN Ron CNP regarding Pain Management.          1/19/2024     1:24 PM 2/29/2024     9:08 AM 4/8/2024     2:10 PM   PEG Score   PEG Total Score 3.33 6.67 5.67       UDS/CSA- 06.08.2023    Medications- Oxycodone 5 mg last taken today @10am    Notes    Domi Rider  [facility] Clinical Assistant

## 2024-04-08 NOTE — PATIENT INSTRUCTIONS
After Visit Instructions:     Thank you for coming to [facility] for your care. It is my goal to partner with you to help you reach your optimal state of health.   Continue daily self-care, identifying contributing factors, and monitoring variations in pain level. Continue to integrate self-care into your life.      Health Psychology: YES Per Shirley's recommendations  Physical therapy: YES [facility] will fax an order for Orthology for low back pain   30 minutes Video or Clinic follow-up with JOCELYN Zavala NP-C in 2 weeks. Also schedule 2 month video or clinic follow up.   Ok to schedule up to three follow up appts at a time, alternating clinic and video visits.   Labs: Annual UDS/CSA  Medication Management :   Percocet 5/325 m tablet up to three times daily in addition to the chronic Oxycodone.   Toradol 30 mg: IM injection given in clinic.   STOP Tylenol for now.   Take Methocarbamol 500 mg 1-2 tabs up to 4 times per day.       JOCELYN Padgett NP-C  [facility]  [facility] - Monday, Thursday and Friday  [facility] - Tuesday      Be sure to request ALL medication refills 5 days prior to the due date whether or not you will see your medical provider in an appointment before the due date.      Do not expect same day refills. If you do not plan ahead you may run out of medications.     Early refills are not provided.  It is your responsibility to manage your medications responsibility and keep them safely stored. Lost or destroyed medications WILL NOT be replaced    Scheduling/Clinic telephone Number for ALL locations:  [phone]    After Hours On-Call Service:  [phone]    Call with any questions about your care and for scheduling assistance.   Calls are returned Monday through Friday between 8 AM and 4:00 PM. We usually get back to you within 2 business days depending on the issue/request.    If we are prescribing your medications:  For opioid  medication refills, call the clinic or send a [employer] message 7 days in advance.  Please include:  Your name and date of birth.   Name of requested medication  Name of the pharmacy.  For non-opioid medications, call your pharmacy directly to request a refill. Please allow 3-4 days to be processed.   Per [place] State Law:  All controlled substance prescriptions must be filled within 30 days of being written.    For those controlled substances allowing refills, pickup must occur within 30 days of last fill.      We believe regular attendance is key to your success in our program!    Any time you are unable to keep your appointment we ask that you call us at least 24 hours in advance to cancel.This will allow us to offer the appointment time to another patient.   Multiple missed appointments may lead to dismissal from the clinic.

## 2024-04-09 ENCOUNTER — VIRTUAL VISIT (OUTPATIENT)
Dept: PALLIATIVE MEDICINE | Facility: CLINIC | Age: 49
End: 2024-04-09
Payer: MEDICARE

## 2024-04-09 DIAGNOSIS — G62.9 PERIPHERAL POLYNEUROPATHY: ICD-10-CM

## 2024-04-09 DIAGNOSIS — M45.8 ANKYLOSING SPONDYLITIS OF SACRAL REGION (H): ICD-10-CM

## 2024-04-09 DIAGNOSIS — M51.369 DDD (DEGENERATIVE DISC DISEASE), LUMBAR: ICD-10-CM

## 2024-04-09 DIAGNOSIS — G43.111 INTRACTABLE MIGRAINE WITH AURA WITH STATUS MIGRAINOSUS: ICD-10-CM

## 2024-04-09 DIAGNOSIS — M47.816 SPONDYLOSIS OF LUMBAR REGION WITHOUT MYELOPATHY OR RADICULOPATHY: ICD-10-CM

## 2024-04-09 DIAGNOSIS — G89.29 CHRONIC INTRACTABLE PAIN: Primary | ICD-10-CM

## 2024-04-09 PROCEDURE — 96158 HLTH BHV IVNTJ INDIV 1ST 30: CPT | Mod: 95 | Performed by: PSYCHOLOGIST

## 2024-04-09 PROCEDURE — 96159 HLTH BHV IVNTJ INDIV EA ADDL: CPT | Mod: 95 | Performed by: PSYCHOLOGIST

## 2024-04-09 NOTE — PROGRESS NOTES
Joel Pineda is a 48 year old male who is being evaluated via a billable video visit.      Patient is currently in the [facility]? yes    Patient would like the video invitation sent by: Text to cell phone: [phone]}    Video Start Time: 3:00 PM  Video Stop Time: 3:49 PM      Additional provider notes:     Pain Diagnoses per pain provider:   Chronic intractable pain    Intractable migraine with aura with status migrainosus    DDD (degenerative disc disease), lumbar    Ankylosing spondylitis of sacral region (H)    Peripheral polyneuropathy    Spondylosis of lumbar region without myelopathy or radiculopathy    DATA: During today's visit you reported the following: Your chronic pain is exacerbated by acute back pain after a fall a couple weeks ago. Your mood is variable - 'always a let down after a visit with a family member or vacation' - discussed physio biology of neurotransmitter release with socialization. Your activity level is about the same. Your stress level is about the same, similar triggers specifically related to [facility]. Your sleep is stable. You reported engaging in self-care for your pain 2-3 times daily.    You identified that you would like to focus on the following or had questions regarding the following issues or concerns, and we discussed the following:   - update from visit with JOCELYN Zavala CNP yesterday  - father was up for a visit - it was enjoyable  - discussed being gentle with Thera-cane, not using it to the point of pain  - still negotiating with [facility] for new management providers  - role of fear in pain response  - using james to track migraine symptoms    ASSESSMENT: Tito reports ongoing chronic pain, especially headache pain, which has been exacerbated by acute pain after a fall. He continues to be engaged and is open to exploring strategies to manage his chronic pain.     PLAN:   Your next appointment is scheduled for 4/23 at 3:00 PM.  Assignment/Objectives  /interventions for next session:   - break tasks into manageable chunks of time    We believe regular attendance is key to your success in our program!    Any time you are unable to keep your appointment we ask that you call us at [phone] at least 24 hours in advance to cancel.This will allow us to offer the appointment time to another patient.   Multiple missed appointments may lead to dismissal from the clinic.    Telemedicine Visit: The patient's condition can be safely assessed and treated via synchronous audio and visual telemedicine encounter.      Reason for Telemedicine Visit: Services only offered telehealth    Originating Site (Patient Location): Patient's home    Distant Site (Provider Location): Provider Remote Setting- Home Office    Consent:  The patient/guardian has verbally consented to: the potential risks and benefits of telemedicine (video visit) versus in person care; bill my insurance or make self-payment for services provided; and responsibility for payment of non-covered services.     Mode of Communication:  Video Conference via [employer]    As the provider I attest to compliance with applicable laws and regulations related to telemedicine.      Shirley Bartlett PsyD LP  Licensed Psychologist  Outpatient Clinic Therapist  [facility]

## 2024-04-12 ENCOUNTER — TRANSFERRED RECORDS (OUTPATIENT)
Dept: HEALTH INFORMATION MANAGEMENT | Facility: CLINIC | Age: 49
End: 2024-04-12
Payer: MEDICARE

## 2024-04-12 DIAGNOSIS — E78.1 HYPERTRIGLYCERIDEMIA: ICD-10-CM

## 2024-04-13 ENCOUNTER — LAB (OUTPATIENT)
Dept: LAB | Facility: CLINIC | Age: 49
End: 2024-04-13
Payer: MEDICARE

## 2024-04-13 ENCOUNTER — MYC MEDICAL ADVICE (OUTPATIENT)
Dept: FAMILY MEDICINE | Facility: CLINIC | Age: 49
End: 2024-04-13

## 2024-04-13 DIAGNOSIS — M45.9 ANKYLOSING SPONDYLITIS, UNSPECIFIED SITE OF SPINE (H): ICD-10-CM

## 2024-04-13 DIAGNOSIS — E11.9 TYPE 2 DIABETES MELLITUS WITHOUT COMPLICATION, WITHOUT LONG-TERM CURRENT USE OF INSULIN (H): ICD-10-CM

## 2024-04-13 LAB
ALBUMIN SERPL BCG-MCNC: 4.7 G/DL (ref 3.5–5.2)
ALP SERPL-CCNC: 61 U/L (ref 40–150)
ALT SERPL W P-5'-P-CCNC: 14 U/L (ref 0–70)
ANION GAP SERPL CALCULATED.3IONS-SCNC: 12 MMOL/L (ref 7–15)
AST SERPL W P-5'-P-CCNC: 27 U/L (ref 0–45)
BASOPHILS # BLD AUTO: 0.1 10E3/UL (ref 0–0.2)
BASOPHILS NFR BLD AUTO: 1 %
BILIRUB SERPL-MCNC: 0.4 MG/DL
BUN SERPL-MCNC: 13.9 MG/DL (ref 6–20)
CALCIUM SERPL-MCNC: 9.9 MG/DL (ref 8.6–10)
CHLORIDE SERPL-SCNC: 105 MMOL/L (ref 98–107)
CREAT SERPL-MCNC: 1.1 MG/DL (ref 0.67–1.17)
CREAT UR-MCNC: 51.3 MG/DL
CRP SERPL-MCNC: 9.45 MG/L
DEPRECATED HCO3 PLAS-SCNC: 22 MMOL/L (ref 22–29)
EGFRCR SERPLBLD CKD-EPI 2021: 82 ML/MIN/1.73M2
EOSINOPHIL # BLD AUTO: 0.2 10E3/UL (ref 0–0.7)
EOSINOPHIL NFR BLD AUTO: 1 %
ERYTHROCYTE [DISTWIDTH] IN BLOOD BY AUTOMATED COUNT: 12.4 % (ref 10–15)
ERYTHROCYTE [SEDIMENTATION RATE] IN BLOOD BY WESTERGREN METHOD: 9 MM/HR (ref 0–20)
GLUCOSE SERPL-MCNC: 94 MG/DL (ref 70–99)
HCT VFR BLD AUTO: 40.8 % (ref 40–53)
HGB BLD-MCNC: 13.9 G/DL (ref 13.3–17.7)
IMM GRANULOCYTES # BLD: 0 10E3/UL
IMM GRANULOCYTES NFR BLD: 0 %
LYMPHOCYTES # BLD AUTO: 3.4 10E3/UL (ref 0.8–5.3)
LYMPHOCYTES NFR BLD AUTO: 27 %
MCH RBC QN AUTO: 32 PG (ref 26.5–33)
MCHC RBC AUTO-ENTMCNC: 34.1 G/DL (ref 31.5–36.5)
MCV RBC AUTO: 94 FL (ref 78–100)
MICROALBUMIN UR-MCNC: 14.4 MG/L
MICROALBUMIN/CREAT UR: 28.07 MG/G CR (ref 0–17)
MONOCYTES # BLD AUTO: 1.1 10E3/UL (ref 0–1.3)
MONOCYTES NFR BLD AUTO: 9 %
NEUTROPHILS # BLD AUTO: 8 10E3/UL (ref 1.6–8.3)
NEUTROPHILS NFR BLD AUTO: 63 %
PLATELET # BLD AUTO: 215 10E3/UL (ref 150–450)
POTASSIUM SERPL-SCNC: 4 MMOL/L (ref 3.4–5.3)
PROT SERPL-MCNC: 7.3 G/DL (ref 6.4–8.3)
RBC # BLD AUTO: 4.35 10E6/UL (ref 4.4–5.9)
SODIUM SERPL-SCNC: 139 MMOL/L (ref 135–145)
WBC # BLD AUTO: 12.7 10E3/UL (ref 4–11)

## 2024-04-13 PROCEDURE — 85652 RBC SED RATE AUTOMATED: CPT

## 2024-04-13 PROCEDURE — 82570 ASSAY OF URINE CREATININE: CPT

## 2024-04-13 PROCEDURE — 86140 C-REACTIVE PROTEIN: CPT

## 2024-04-13 PROCEDURE — 36415 COLL VENOUS BLD VENIPUNCTURE: CPT

## 2024-04-13 PROCEDURE — 85025 COMPLETE CBC W/AUTO DIFF WBC: CPT

## 2024-04-13 PROCEDURE — 82043 UR ALBUMIN QUANTITATIVE: CPT

## 2024-04-13 PROCEDURE — 80053 COMPREHEN METABOLIC PANEL: CPT

## 2024-04-15 LAB
7AMINOCLONAZEPAM UR QL CFM: PRESENT
OXYCODONE UR CFM-MCNC: 356 NG/ML
OXYCODONE/CREAT UR: 712 NG/MG {CREAT}
OXYMORPHONE UR CFM-MCNC: 54 NG/ML
OXYMORPHONE/CREAT UR: 108 NG/MG {CREAT}
PREGABALIN UR QL CFM: PRESENT

## 2024-04-15 RX ORDER — ROSUVASTATIN CALCIUM 40 MG/1
40 TABLET, COATED ORAL DAILY
Qty: 90 TABLET | Refills: 2 | Status: SHIPPED | OUTPATIENT
Start: 2024-04-15

## 2024-04-15 NOTE — TELEPHONE ENCOUNTER
Routing to provider to review and advise. Does he need an appointment for this?    Also routing to 's to address forms printed off.    Aj Stiles RN  [facility]

## 2024-04-16 ENCOUNTER — MYC MEDICAL ADVICE (OUTPATIENT)
Dept: FAMILY MEDICINE | Facility: CLINIC | Age: 49
End: 2024-04-16
Payer: MEDICARE

## 2024-04-16 NOTE — PROGRESS NOTES
Joel Pineda's goals for this visit include:   Chief Complaint   Patient presents with     New Patient     varicocele evaluation and discussion, repeat testosterone lab        He requests these members of his care team be copied on today's visit information:     PCP: Ksenia Lyles    Referring Provider:  No referring provider defined for this encounter.    There were no vitals taken for this visit.    Do you need any medication refills at today's visit?     Shani Rodrigez LPN on 8/3/2022 at 9:21 AM     normal/regular rate and rhythm/S1 S2 present/no gallops/no rub/no murmur

## 2024-04-18 ENCOUNTER — OFFICE VISIT (OUTPATIENT)
Dept: RHEUMATOLOGY | Facility: CLINIC | Age: 49
End: 2024-04-18
Payer: MEDICARE

## 2024-04-18 ENCOUNTER — MYC MEDICAL ADVICE (OUTPATIENT)
Dept: ENDOCRINOLOGY | Facility: CLINIC | Age: 49
End: 2024-04-18

## 2024-04-18 VITALS
DIASTOLIC BLOOD PRESSURE: 74 MMHG | SYSTOLIC BLOOD PRESSURE: 110 MMHG | RESPIRATION RATE: 18 BRPM | WEIGHT: 282 LBS | HEART RATE: 83 BPM | OXYGEN SATURATION: 99 % | BODY MASS INDEX: 34.33 KG/M2

## 2024-04-18 DIAGNOSIS — M45.9 ANKYLOSING SPONDYLITIS, UNSPECIFIED SITE OF SPINE (H): ICD-10-CM

## 2024-04-18 PROCEDURE — 99214 OFFICE O/P EST MOD 30 MIN: CPT | Performed by: INTERNAL MEDICINE

## 2024-04-18 ASSESSMENT — PAIN SCALES - GENERAL: PAINLEVEL: SEVERE PAIN (6)

## 2024-04-18 NOTE — PROGRESS NOTES
Rheumatology Clinic Visit  [facility]  Frantz Kaur M.D.     Joel Pineda MRN# [id number]   YOB: 1975 Age: 48 year old   Date of Visit: 04/18/2024  Primary care provider: Ksenia Lyles          Assessment and Plan:     # Ankylosing spondylitis  Patient with radiographic evidence of ankylosing spondylitis and sustained response to TNF inhibitors since 2018. Patient states that his symptoms have been well-controlled on Enbrel since the last visit. Exam shows minimal restriction in cervical or lumbar spine range of motion,     Data: On April 13, 2024, comprehensive metabolic panel was normal; CRP was elevated at 9.45, sed rate was 9,   From: 11/06/17 ([facility]): neg DANIS, chromatin DNA, ribosomal P, SSA, SSB, SMRNP, Scl-70, Jo1, Centromere B, Barnes, RNP, DNA  From 11/22/10 ([facility]): nl IgG4  From 11/13/10 ([facility]): neg c-ANCA, p-ANCA,       Imaging: CT of the abdomen pelvis on March 30, 2024 was normal.    Lumbar xray 02/23/2018: Moderate L5-S1 and mild L4-5 degenerative disc disease and degenerative facet arthropathy. No evidence for fracture, subluxation, or spondylolisthesis. Chronic bridging enthesophyte across the lower right SI joint with irregularity of the joint space suspicious for sequelae of chronic inflammatory sacroiliitis.    Discussion: Patient's ankylosing spondylitis is symptomatically well-controlled on Enbrel monotherapy.  He has minimal disruption in lumbar spine range of motion, despite ankylosing spondylitis with probable symptom onset onset in the third decade.  I recommend continuing TNF antagonism.  Interleukin-17 antagonism represents a viable alternative treatment should etanercept lose efficacy.  Would avoid Gavino kinase inhibitors given history of diverticulitis and colitis.    Plan:  1.  Continue etanercept 50 mg subcutaneous injected once weekly, to be prescribed by [facility] liaison.  2.  Creatinine, CBC every 6  months.    # Degenerative disc disease, low back pain  Follows with pain clinic, controlled with physical therapy and with Lyrica.    # Plantar fasciitis/ Achilles tendinopathy not discussed today. Patient is going through PT for these issues and is currently following with podiatry. He is looking to get steroid injections for his plantar fascia soon. No signs of tendon inflammation on today's exam.       # Gastroparesis, Diverticulitis, sigmoidectomy, h/o Microscopic colitis: Colonoscopy planned in the near future.  For best safety with regard to periprocedure infection risk and wound healing, I recommend withholding Enbrel 1 week prior and 1 week after procedure.    # Type 2 diabetes  Patient's A1C 5.5 in June 2023 on Ozempic. He has also lost ~35lbs on this medication. Currently managing this with FP and with GI (due to gastroparesis).     RTC 8 mos    Frantz Kaur M.D.  Staff Rheumatologist,  Health  Pager [phone]    On the day of the encounter, a total of 31 minutes was spent in chart review, and in counseling and coordination of care, regarding the patient's complex medical problem of ankylosing spondylitis with sacroiliitis, migraine headaches, degenerative disc disease with chronic low back pain.    The longitudinal plan of care for the diagnosis(es)/condition(s) as documented were addressed during this visit. Due to the added complexity in care, I will continue to support Tito in the subsequent management and with ongoing continuity of care.           History of Present Illness:   Joel Pineda presents for follow-up of ankylosing spondylitis.  He was last seen in September 2023.  At that time, spondylitis and sacroiliitis were moderately well-controlled with Enbrel monotherapy.    Previously seen by Dr. Baxter. Per note from 10/19/22: patient had many years of inflammatory back pain prior to having a lumbar spine x-ray on 2/23/2018 at [facility] showing DDD, degenerative face arthropathy, and  chronic bridging enthesophyte across the lower right SI joint with irregularity of the joint space suspicious for sequelae of chronic inflammatory sacroiliitis. Per chart review, he is HLA B27 positive, but primary lab report is not available. Patient had a history of diverticulitis requiring sigmoidectomy and reportedly his mother has ulcerative colitis.     Diagnosis of ankylosing spondylitis made base on these factors. Treatment history is as follows:  - Remicade was started with improvement of lower back pain and stiffness, but also associated with increased infections.  - Simponi was associated with hives, nausea, dizziness, and drowsiness   - Humira effective but associated with a sensation of burning on his tongue, and longstanding nausea  - Enbrel was started and patient has responded well, with worsening arthritis symptoms each time Enbrel has been held    Previously with mild right Achilles tendon pain that has since resolved with PT exercises. Patient additionally had back pain more c/w degenerative back pain.  He was following with the pain management clinic for the degenerative low back pain.     Plan at that visit was to continue Enbrel 50mg SQ every 7 days    Initial history September 12, 2023  Patient has a long history of low back. He recalls having to miss work a few times in his 20s due to pain. He had a microdiscectomy as far back as 2008 at L5-S1. He was known to carry HLA B27, but not diagnosed until 2018 when xray revealed signs of chronic inflammation and eburnation in the R SI joint. He has been on several medications that were effective, but had side effects including hives. He has bene on Enbrel for at least 4 years and feels like it is working. He notes that when he had to come off of his Enbrel in 2019 after he had a procedure for his hidradenitis suppurativa and recurrent Shingles (now on prophylactic Valtrex), he had significantly more pain. He continues to have midline back pain  "managed by the pain clinic. He is hoping to have another round of RFA soon to help manage these symptoms. Back pain is 2/10 today.     Patient has a number of other medical conditions. He has neck pain he attributes to injuries sustained while sleep walking. He has been through several round of PTx and is now seeing podiatry for plantar fasciitis and Achilles tendinopathy. He had a sigmoidectomy in 2013 due to diverticulitis that recurred roughly every year. Since then, he has only had 2 flares of diverticulitis. He is now following GI for gastroparesis. Other past medical conditions include asthma, type 2 diabetes (A1C 5.5 on Ozempic), hyperlipidemia/hypertriglyceridemia, essential hypertension, POTS/autonomic dysfunction, hypothyroidism, LLOYD, REM sleep behavior disorder, anxiety, and depression.      Interval history April 18, 2024  He has worsening migraine HA. He has had botox injections one week ago. Concern was raised for \"rebound\" HA related to tylenol and to NSAIDs. He is also concerned about maxalt as a cause.    He has noted ongoing fecal urgency/incontinence; he will have colonoscopy for microscopic colitis    Fell several weeks ago; he had abdominal pain--ED visit showed no abnormality on CT; but back pain started. He has continued Ptx, ice pack, chronic pain program. He has increased lyrica to 450 mg total per day.    Chronic LBP is stable    He has continued with enbrel; he will hold the drug around colonoscopy for 3 weeks.           Review of Systems:     Constitutional: negative  Skin: hx of hidradenitis suppurativa and recurrent shingles   Eyes: dry eyes requiring artificial tears; no history of uveitis  Ears/Nose/Throat: No hx of mucosal lesions  Respiratory: No shortness of breath, dyspnea on exertion, cough  Gastrointestinal: Hx of diverticulitis and sigmoidectomy per HPI; hx of gastroparesis, stool retention, and bowel incontinence; no blood in the stools or black, tarry stools  Genitourinary: " No history of urinary incontinence and   Musculoskeletal: Per HPI  Neurologic: Some numbness in things 2/2 nerve block injection; hx of radiculopathy; no weakness  Psychiatric: Hx of psychosis said to be 2/2 autoimmune condition, now controlled         Active Problem List:     Patient Active Problem List    Diagnosis Date Noted    Therapeutic opioid induced constipation 11/16/2023     Priority: Medium    Anal fissure 11/09/2023     Priority: Medium    Altered bowel function 11/09/2023     Priority: Medium    Generalized abdominal pain 11/09/2023     Priority: Medium    Nausea 11/09/2023     Priority: Medium    Perianal abscess 11/09/2023     Priority: Medium    Hyperlipidemia LDL goal <70 04/06/2023     Priority: Medium    RBD (REM behavioral disorder) 09/30/2022     Priority: Medium    Small fiber neuropathy 09/12/2022     Priority: Medium    Folliculitis 12/31/2021     Priority: Medium    Immunosuppression (H24) 12/31/2021     Priority: Medium    MDD (major depressive disorder), recurrent severe, without psychosis (H) 12/22/2021     Priority: Medium    Borderline personality disorder (H) 11/17/2021     Priority: Medium    Chronic pain syndrome 07/12/2021     Priority: Medium    Benign neoplasm of cecum 09/17/2020     Priority: Medium    Benign neoplasm of ascending colon 09/15/2020     Priority: Medium    Dysautonomia (H) 08/18/2020     Priority: Medium    PHN (postherpetic neuralgia) 07/15/2020     Priority: Medium    POTS (postural orthostatic tachycardia syndrome) 03/24/2020     Priority: Medium    Orthostatic dizziness 03/18/2020     Priority: Medium    Gastroparesis 01/24/2020     Priority: Medium    Digestive symptom 01/07/2020     Priority: Medium    Hypertriglyceridemia 12/16/2019     Priority: Medium    Ingrown toenail 07/03/2019     Priority: Medium    History of pulmonary embolism 01/28/2019     Priority: Medium    Peripheral polyneuropathy 01/23/2019     Priority: Medium    Type 2 diabetes mellitus  with complication, without long-term current use of insulin (H) 12/24/2018     Priority: Medium    DDD (degenerative disc disease), lumbar 11/13/2018     Priority: Medium    Morbid obesity (H) 05/17/2018     Priority: Medium    Fatty infiltration of liver 05/17/2018     Priority: Medium    Ankylosing spondylitis of sacral region (H) 02/28/2018     Priority: Medium    Chronic, continuous use of opioids 04/04/2017     Priority: Medium     Patient is followed by Ksenia Lyles MD for ongoing prescription of pain medication.  All refills should only be approved by this provider, or covering partner.    Medication(s): oxy 5.   Maximum quantity per month: 40  Clinic visit frequency required: Q3  months     Controlled substance agreement: 6/23/2020  UDS: Aug 2019    CSA -- Encounter Level on 04/04/2017:    Controlled Substance Agreement - Scan on 4/11/2017  1:30 PM: CONTROLLED SUBSTANCE AGREEMENT       CSA -- Patient Level:    Controlled Substance Agreement - Opioid - Scan on 8/9/2021  2:05 PM  Controlled Substance Agreement - Opioid - Scan on 6/23/2020  3:50 PM  Controlled Substance Agreement - Opioid - Scan on 6/12/2019  6:16 PM       Pain Clinic evaluation in the past: Yes    DIRE Total Score(s):  No flowsheet data found.    Last [facility] website verification:  12/2/2020   [email or web address]       Internal hemorrhoids 11/03/2016     Priority: Medium    Essential hypertension with goal blood pressure less than 140/90 11/01/2016     Priority: Medium    Anorectal disorder 10/12/2016     Priority: Medium    B12 deficiency 08/29/2016     Priority: Medium    Irritable bowel syndrome with diarrhea 08/19/2016     Priority: Medium    Chronic midline low back pain without sciatica 06/06/2016     Priority: Medium    Hemorrhage of rectum and anus 03/16/2016     Priority: Medium    Acquired hypothyroidism 10/08/2015     Priority: Medium    Facet arthritis of cervical region 10/06/2015     Priority: Medium     Intracranial arachnoid cyst 10/02/2015     Priority: Medium    LLOYD (obstructive sleep apnea)- mild (AHI 11) 01/31/2015     Priority: Medium     Study Date: 1/27/2015- (308.1 lbs)  Snoring was reported assoft to moderate.  Lowest oxygen saturation was 88.0%. Apnea/Hypopnea Index was 11.5 events per hour. REM was not seen.  The supine AHI is 12.7.  RDI was  25.7. PLM index was 0 per hour.  Sleep study 10/31/16 [facility] (292#) CPAP 8 cm effective      Generalized anxiety disorder 01/12/2015     Priority: Medium    GERD (gastroesophageal reflux disease)      Priority: Medium    History of colitis 04/16/2013     Priority: Medium     Formatting of this note might be different from the original. Overview: 12/19/11, 9/15/12, 2/17/13, 4/12/13      Dysphagia 07/18/2011     Priority: Medium    Chronic nonallergic rhinitis      Priority: Medium     RAST all NEGATIVE for environmental allergens, IgE= 6 (normal)      Intermittent asthma      Priority: Medium     Exercise-induced      Diverticulosis 09/01/2006     Priority: Medium     Recurrent diverticulitis, S/p sigmoid resction 8/2013              Past Medical History:     Past Medical History:   Diagnosis Date    Acne     Acquired hypothyroidism     Allergic state     Ankylosing spondylitis lumbar region (H)     Ankylosing spondylitis of sacral region (H)     Anxiety     Bipolar 2 disorder (H)     Chest pain     Chest pain, regulated w/BP meds. Clear arteries.    Chronic pain     DDD (degenerative disc disease), lumbar     Depressive disorder     Diabetes (H)     Diverticulosis     Facet arthritis of cervical region     Gastroesophageal reflux disease     Hypertension     IBS (irritable bowel syndrome)     Intracranial arachnoid cyst     Major depressive disorder, recurrent episode (H24)     Multiple psych providers - they manage meds August 2015: Provigil induced severe mood dis-function     Major depressive disorder, recurrent episode, severe (H) 12/2/2020    MDD (major  depressive disorder), recurrent severe, without psychosis (H) 7/21/2021    Mixed dyslipidemia 4/6/2023    LLOYD (obstructive sleep apnea)- mild (AHI 11)     Polyneuropathy     Pulmonary embolism (H)     Skin exam, screening for cancer 12/3/2013    Sleep apnea     Uncomplicated asthma      Past Surgical History:   Procedure Laterality Date    BACK SURGERY  10/2007    lumbar discectomy L5-S1    BIOPSY  09/15/2020    Colon Adenomas x2    COLONOSCOPY      Note: colonoscopy scheduled with [facility] on Friday, 9/4/15    COSMETIC SURGERY  2012    Nose Exterior - functional    GI SURGERY  08/2013    Sigmoidectomy    HERNIA REPAIR, UMBILICAL  08/23/2011    Dr. Evan whiting    INCISION AND DRAINAGE, ABSCESS, COMPLEX  08/23/2011    umbilical, Dr. Evan Beavers    LAPAROSCOPIC ASSISTED COLECTOMY LEFT (DESCENDING)  08/15/2013    Procedure: LAPAROSCOPIC ASSISTED COLECTOMY LEFT (DESCENDING);  Laparoscopic Hand Assisted Sigmoid Resection, Mobilization of Splenic Fissure, coloproctoscopy, *Latex Free Room* Anesthesia General with Pain block  ;  Surgeon: Aurora Justice MD;  Location: [facility] OR    NERVE SURGERY  08/18/2011    RF ablation @ L3-S1 @ [facility]    RECONSTRUCT NOSE AND SEPTUM (FUNCTIONAL)  10/14/2011    Procedure:RECONSTRUCT NOSE AND SEPTUM (FUNCTIONAL); Functional Septorhinoplasty, Turbinate Reduction, ; Surgeon:CEDRIC CUEVAS; Location:[facility] OR    SINUS SURGERY  10/01/2001    ethmoidectomy chronic sinusitis    SOFT TISSUE SURGERY  4/7/2022    Hidradenitis Suppurativa surgery            Social History:     Social History     Socioeconomic History    Marital status: Single     Spouse name: Not on file    Number of children: Not on file    Years of education: Not on file    Highest education level: Not on file   Occupational History    Occupation:      Employer: [employer]    Occupation: paraprofessional     Comment: [employer]     Employer: [employer]   Tobacco Use    Smoking status: Never    Smokeless tobacco:  "Never   Vaping Use    Vaping status: Never Used   Substance and Sexual Activity    Alcohol use: Not Currently     Comment: occ 1/week    Drug use: No     Comment: synthetic THC for nausea    Sexual activity: Not Currently     Partners: Female     Birth control/protection: Condom, Pill   Other Topics Concern    Parent/sibling w/ CABG, MI or angioplasty before 65F 55M? Yes     Comment: 51yo brother survived a \"massive\" PE several months ago     Service Not Asked    Blood Transfusions Not Asked    Caffeine Concern Not Asked    Occupational Exposure Not Asked    Hobby Hazards Not Asked    Sleep Concern Yes    Stress Concern Yes    Weight Concern Not Asked    Special Diet Not Asked    Back Care Yes    Exercise Not Asked    Bike Helmet Not Asked    Seat Belt Yes    Self-Exams Not Asked   Social History Narrative    Not on file     Social Determinants of Health     Financial Resource Strain: Low Risk  (11/15/2023)    Financial Resource Strain     Within the past 12 months, have you or your family members you live with been unable to get utilities (heat, electricity) when it was really needed?: No   Food Insecurity: Low Risk  (11/15/2023)    Food Insecurity     Within the past 12 months, did you worry that your food would run out before you got money to buy more?: No     Within the past 12 months, did the food you bought just not last and you didn t have money to get more?: No   Transportation Needs: High Risk (11/15/2023)    Transportation Needs     Within the past 12 months, has lack of transportation kept you from medical appointments, getting your medicines, non-medical meetings or appointments, work, or from getting things that you need?: Yes   Physical Activity: Not on file   Stress: Not on file   Social Connections: Not on file   Interpersonal Safety: Low Risk  (2/29/2024)    Interpersonal Safety     Do you feel physically and emotionally safe where you currently live?: Yes     Within the past 12 months, have " "you been hit, slapped, kicked or otherwise physically hurt by someone?: No     Within the past 12 months, have you been humiliated or emotionally abused in other ways by your partner or ex-partner?: No   Housing Stability: Low Risk  (11/15/2023)    Housing Stability     Do you have housing? : Yes     Are you worried about losing your housing?: No          Family History:     Family History   Problem Relation Age of Onset    Musculoskeletal Disorder Mother         back    Anxiety Disorder Mother     Colon Polyps Mother     Ulcerative Colitis Mother         and ischemic small intestine, surgery    Hypertension Mother     Breast Cancer Mother     Osteoporosis Mother     Diabetes Mother         Type 2, Diagnosed in 2014    Depression Mother         Takes Cymbalta to help with chronic pain + depx    Thyroid Disease Mother         Hypothyroidism    Obesity Mother         Under much better control latter half of 2015    Musculoskeletal Disorder Father         back    Substance Abuse Father         Alcohol    Hypertension Father     Hyperlipidemia Father     Depression Father         Off meds for many years. Seems \"ok\"    Anxiety Disorder Father     Heart Disease Maternal Grandmother     Heart Disease Maternal Grandfather     Psychotic Disorder Paternal Grandfather     Suicide Paternal Grandfather     Depression Paternal Grandfather         Pediatrician. Committed suicide by pistol in 1990.    Musculoskeletal Disorder Brother         back    Depression Brother         Expressed as anger and moodiness    Substance Abuse Brother         Alcohol    Anxiety Disorder Brother     Substance Abuse Sister         Alcohol    Depression Sister         Mental Health Therapist, yet no anti-depressants?    Anxiety Disorder Sister         Mental Health Therapist, yet no anti-anxiety meds?    Other Cancer Other         Bladder    Substance Abuse Brother     Colon Cancer No family hx of     Crohn's Disease No family hx of     Anesthesia " Reaction No family hx of     Cancer No family hx of         No family history of skin cancer            Allergies:     Allergies   Allergen Reactions    Amoxicillin-Pot Clavulanate Difficulty breathing    Banana Shortness Of Breath     Pt reports organic Banana is okay.     Nitroglycerin Palpitations    Penicillins Anaphylaxis    Provigil [Modafinil] Shortness Of Breath     headache    Gadolinium Hives and Itching     Patient was premedicated for the contrast allergy. He did still have a reaction a few hours after injection. Hives and itching. Dr. Gomez told tech to inform pt he should only have contrast again in the future when premedicated and at a hospital. Not at an outpatient facility.     Influenza Virus Vaccine Rash     Quadrivalent, cell based    Ketoconazole      Topical cream caused swelling and itching    Dye [Contrast Dye] Other (See Comments) and Hives     Moderate flushing, CT contrast  Iodinated and gadolinium    Gabapentin      Other reaction(s): hives    Golimumab      Hives, bradycardia, face swelling    Naproxen      Other reaction(s): Bleeding Gums    Neurontin [Gabapentin] Hives     Moderate hives    Nortriptyline Hives    Nystatin Hives    Varicella Virus Vaccine Live      Rash    Adhesive Tape Rash    Baclofen Other (See Comments)     Cognitive changes    Flagyl [Metronidazole Hcl] Palpitations and Hives    Latex Rash    Metronidazole Palpitations, Other (See Comments) and Rash     dizziness (versus ciprofloxacin taken at same time)            Medications:     Current Outpatient Medications   Medication Sig Dispense Refill    acetaminophen 500 MG CAPS Take 500 mg by mouth 3 times daily      albuterol (PROAIR HFA/PROVENTIL HFA/VENTOLIN HFA) 108 (90 Base) MCG/ACT inhaler INHALE 2 PUFFS BY MOUTH EVERY 4 HOURS AS NEEDED FOR SHORTNESS OF BREATH, DYSPNEA, OR WHEEZING 25.5 g 3    aspirin (ASA) 81 MG tablet Take 81 mg by mouth daily       bisacodyl (DULCOLAX) 5 MG EC tablet Take 10 mg by mouth  "as needed for constipation      buPROPion (WELLBUTRIN XL) 300 MG 24 hr tablet Take 1 tablet by mouth daily      cholecalciferol (D3-50) 1250 mcg (86982 units) capsule TAKE ONE CAPSULE BY MOUTH EVERY 2 WEEKS 24 capsule 0    clonazePAM (KLONOPIN) 0.5 MG tablet Take 1 mg by mouth At Bedtime For RBD      cyanocobalamin (CYANOCOBALAMIN) 1000 MCG/ML injection INJECT 1 ML INTO THE MUSCLE EVERY 30 DAYS 10 mL 0    diphenhydrAMINE (BENADRYL) 25 MG capsule Take 25 mg by mouth once a week Before Enbrel injection      docusate sodium (COLACE) 50 MG capsule Take 100 mg by mouth 2 times daily      droNABinol (MARINOL) 5 MG capsule Take 5 mg by mouth      DULoxetine (CYMBALTA) 60 MG capsule Take 120 mg by mouth daily       EPINEPHrine (ADRENACLICK JR) 0.15 MG/0.15ML injection 2-pack Inject 0.15 mg into the muscle as needed for anaphylaxis May repeat one time in 5-15 minutes if response to initial dose is inadequate. This medication stays on the medication list as an \"as needed in case of emergency\" medication.      eszopiclone (LUNESTA) 3 MG tablet Take 3 mg by mouth At Bedtime       etanercept (ENBREL SURECLICK) 50 MG/ML autoinjector Inject 50 mg Subcutaneous once a week . Hold for signs of infection, and seek medical attention. 4 mL 7    famotidine (PEPCID) 20 MG tablet Take 20 mg by mouth daily      fenofibrate (TRICOR) 48 MG tablet TAKE ONE TABLET BY MOUTH ONCE DAILY 90 tablet 0    fexofenadine (ALLEGRA) 180 MG tablet Take 180 mg by mouth daily      fluticasone-salmeterol (WIXELA INHUB) 100-50 MCG/ACT inhaler Inhale 1 puff into the lungs every 12 hours 3 each 3    levothyroxine (SYNTHROID/LEVOTHROID) 75 MCG tablet TAKE ONE TABLET BY MOUTH EVERY MORNING 90 tablet 2    lidocaine (XYLOCAINE) 5 % external ointment Apply topically 2 times daily as needed for moderate pain 50 g 3    melatonin 3 MG tablet Take 13 mg by mouth nightly as needed       methocarbamol (ROBAXIN) 500 MG tablet TAKE 1 - 2 TABLETS BY MOUTH 4 TIMES DAILY AS " "NEEDED FOR MUSCLE SPASMS 240 tablet 1    metoprolol succinate ER (TOPROL XL) 100 MG 24 hr tablet TAKE 1 TABLET BY MOUTH IN THE EVENING; TO BE USED WITH 200MG IN MORNING 90 tablet 0    metoprolol succinate ER (TOPROL XL) 200 MG 24 hr tablet Take 1 tablet (200 mg) by mouth 2 times daily 180 tablet 1    montelukast (SINGULAIR) 10 MG tablet TAKE ONE TABLET BY MOUTH EVERY EVENING 90 tablet 3    nabumetone (RELAFEN) 500 MG tablet Take 1-2 tablets (500-1,000 mg) by mouth 2 times daily as needed for moderate pain 120 tablet 1    naloxone (NARCAN) 4 MG/0.1ML nasal spray Spray 4 mg into one nostril alternating nostrils as needed for opioid reversal every 2-3 minutes until assistance arrives. This medication is an antidote and stays on the chart as an \"as needed\" medication      olopatadine (PATADAY) 0.2 % ophthalmic solution Place 1 drop into both eyes daily 2.5 mL 3    omega-3 acid ethyl esters (LOVAZA) 1 g capsule TAKE TWO CAPSULES BY MOUTH TWICE DAILY 360 capsule 2    omeprazole (PRILOSEC) 20 MG DR capsule Take 20 mg by mouth as needed      ondansetron (ZOFRAN ODT) 8 MG ODT tab Take 1 tablet (8 mg) by mouth every 8 hours as needed for nausea 20 tablet 0    oxyCODONE (ROXICODONE) 5 MG tablet Take 1 tablet (5 mg) by mouth 2 times daily as needed for breakthrough pain Ok to fill 4/24/24 and begin using on 4/26/24. #30 days for chronic pain 60 tablet 0    pregabalin (LYRICA) 150 MG capsule Take 1 capsule (150 mg) by mouth 3 times daily 270 capsule 1    pseudoePHEDrine-guaiFENesin (MUCINEX D)  MG 12 hr tablet Take 1 tablet by mouth every 12 hours      pyridostigmine (MESTINON) 60 MG tablet Take 1 tablet by mouth 3 times daily      ramipril (ALTACE) 10 MG capsule TAKE ONE CAPSULE BY MOUTH ONCE DAILY 90 capsule 0    riboflavin 100 MG CAPS Take 200 mg by mouth daily Two hundred milligrams nightly to prevent migraine      risperiDONE (RISPERDAL) 0.5 MG tablet Take 0.5 mg by mouth 2 times daily as needed      rizatriptan " "(MAXALT) 10 MG tablet Take 1 tablet (10 mg) by mouth at onset of headache for migraine May repeat in 2 hours. Max 3 tablets/24 hours. 30 tablet 0    rosuvastatin (CRESTOR) 40 MG tablet TAKE ONE TABLET BY MOUTH ONCE DAILY 90 tablet 2    Semaglutide, 1 MG/DOSE, (OZEMPIC) 4 MG/3ML pen Inject 1 mg Subcutaneous once a week 3 mL 11    sodium fluoride 1.1 % CREA At Bedtime      syringe/needle, disp, (B-D LUER-LUCILLE SYRINGE) 25G X 1\" 3 ML MISC USE WITH B12 INJECTIONS 12 each 0    topiramate (TOPAMAX) 50 MG tablet Take 1 tablet (50 mg) by mouth 2 times daily 180 tablet 1    valACYclovir (VALTREX) 500 MG tablet Take 1 tablet (500 mg) by mouth daily 90 tablet 3    vitamin B complex with vitamin C (STRESS TAB) tablet Take 1 tablet by mouth daily      ZINC SULFATE-VITAMIN C MT Take 1 tablet by mouth daily              Physical Exam:   Blood pressure 110/74, pulse 83, resp. rate 18, weight 127.9 kg (282 lb), SpO2 99%.  Wt Readings from Last 6 Encounters:   04/18/24 127.9 kg (282 lb)   04/08/24 127.9 kg (282 lb)   02/29/24 127.5 kg (281 lb)   12/19/23 129.3 kg (285 lb)   12/11/23 128.4 kg (283 lb)   11/30/23 129.7 kg (286 lb)     Body mass index is 34.33 kg/m .  Constitutional: well-developed, appearing stated age; cooperative  Eyes: nl EOM, PERRLA, conjunctiva, sclera  ENT: nl external ears, nose, hearing, lips, teeth, gums, throat; no mucous membrane lesions, normal saliva pool  Neck: no visible mass or thyroid enlargement  Resp: normal effort at rest  CV: no edema  Lymph: no cervical, supraclavicular, inguinal or epitrochlear nodes  MS: Limited spinal mobility on side bend (L>R). Able to touch toes on forward bend and to reverse lumbar lordosis. Heels and occiput to wall distance 0 cm. The neck, shoulder, elbow, wrist, MCP/PIP/DIP, knee, ankle, and foot MTP/IP joints were examined and found normal. No active synovitis or altered joint anatomy. Normal  strength.  Skin: no nail pitting, alopecia, rash, nodules or " lesions  Neuro: grossly nl cranial nerves; voice is hoarse   Psych: nl judgement, orientation, memory, affect.         Data:     @      Latest Ref Rng & Units 5/5/2023     2:48 PM 12/12/2023     8:23 AM 4/13/2024     2:09 PM   RHEUM RESULTS   Albumin 3.5 - 5.2 g/dL 4.7  4.9  4.7    ALT 0 - 70 U/L 25  13  14    AST 0 - 45 U/L 31  27  27    Creatinine 0.67 - 1.17 mg/dL 1.01  1.08  1.10    CRP Inflammation <5.00 mg/L  <3.00  9.45    GFR Estimate >60 mL/min/1.73m2 >90  84  82    Hematocrit 40.0 - 53.0 % 44.9  43.7  40.8    Hemoglobin 13.3 - 17.7 g/dL 15.3  14.5  13.9    WBC 4.0 - 11.0 10e3/uL 9.3  7.0  12.7    RBC Count 4.40 - 5.90 10e6/uL 4.74  4.50  4.35    RDW 10.0 - 15.0 % 12.2  12.5  12.4    MCHC 31.5 - 36.5 g/dL 34.1  33.2  34.1    MCV 78 - 100 fL 95  97  94    Platelet Count 150 - 450 10e3/uL 240  200  215    Sed Rate 0 - 20 mm/hr  2  9        Rheumatoid Factor   Date Value Ref Range Status   08/07/2015 <20 <20 IU/mL Final   ,  ,  ,   Cyclic Citrullinated Peptide IgG   Date Value Ref Range Status   08/07/2009 <2  Interpretation:  Negative <5 U/mL Final   ,  ,  ,  ,   DANIS Screen by EIA   Date Value Ref Range Status   09/03/2014 <1.0  Interpretation:  Negative   <1.0 Final   ,  ,  ,  ,  ,  ,  ,   Hepatitis B Core Cindy   Date Value Ref Range Status   02/28/2018 Nonreactive NR^Nonreactive Final   ,   Hep B Surface Agn   Date Value Ref Range Status   02/28/2018 Nonreactive NR^Nonreactive Final   ,  ,  ,  ,   Quantiferon-TB Gold Plus   Date Value Ref Range Status   10/19/2022 Negative Negative Final     Comment:     No interferon gamma response to M.tuberculosis antigens was detected. Infection with M.tuberculosis is unlikely, however a single negative result does not exclude infection. In patients at high risk for infection, a second test should be considered in accordance with the 2017 ATS/IDSA/CDC Clinical Pract  ice Guidelines for Diagnosis of Tuberculosis in Adults and Children      TB1 Ag minus Nil Value   Date  Value Ref Range Status   10/19/2022 0.00 IU/mL Final     TB2 Ag minus Nil Value   Date Value Ref Range Status   10/19/2022 0.00 IU/mL Final     Mitogen minus Nil Result   Date Value Ref Range Status   10/19/2022 9.96 IU/mL Final     Nil Result   Date Value Ref Range Status   10/19/2022 0.04 IU/mL Final   ,  ,  ,  ,  ,  ,  ,  ,   Proteinase 3 Antibody IgG   Date Value Ref Range Status   04/03/2019 <0.2 0.0 - 0.9 AI Final     Comment:     Negative  Antibody index (AI) values reflect qualitative changes in antibody   concentration that cannot be directly associated with clinical condition or   disease state.     ,   Myeloperoxidase Antibody IgG   Date Value Ref Range Status   04/03/2019 <0.2 0.0 - 0.9 AI Final     Comment:     Negative  Antibody index (AI) values reflect qualitative changes in antibody   concentration that cannot be directly associated with clinical condition or   disease state.     ,  ,  ,  ,  ,  ,  ,   Albumin Fraction   Date Value Ref Range Status   02/22/2019 4.7 3.7 - 5.1 g/dL Final     Alpha 2 Fraction   Date Value Ref Range Status   02/22/2019 0.6 0.5 - 0.9 g/dL Final     Beta Fraction   Date Value Ref Range Status   02/22/2019 0.9 0.6 - 1.0 g/dL Final     Gamma Fraction   Date Value Ref Range Status   02/22/2019 0.9 0.7 - 1.6 g/dL Final     Monoclonal Peak   Date Value Ref Range Status   02/22/2019 0.0 0.0 g/dL Final     ELP Interpretation:   Date Value Ref Range Status   02/22/2019   Final    Essentially normal electrophoretic pattern. No monoclonal protein seen. Pathologic   significance requires clinical correlation.  JOYCE Smith M.D., Ph.D., Pathologist.         ,  ,   IGA   Date Value Ref Range Status   07/11/2016 188 70 - 380 mg/dL Final   ,  ,  ,  ,  ,  ,  ,

## 2024-04-18 NOTE — TELEPHONE ENCOUNTER
Called and spoke to Tito. Will hold Ozempic until after the procedure. Will check with anesthesia if they want him to wait 1-2 weeks after the procedure to resume Ozempic or if he can restart next day.    Stacey Laguna RN, [facility] [place]

## 2024-04-18 NOTE — PATIENT INSTRUCTIONS
Diagnosis:  1.  Ankylosing spondylitis with sacroiliitis: Range of motion in the neck and lumbar spine is generally well-preserved.  I recommend continuing Enbrel  2.  Migraine headaches: Improved with Botox.  3.  Posttraumatic low back pain: Gradually improving with physical therapy, exercise, and chronic pain regimen.  4.  History of microscopic colitis: Follow-up colonoscopy scheduled.    Plan:  1.  Continue etanercept 50 mg subcutaneous injected once weekly, to be prescribed by [facility] pharmacy liaison.  2.  Creatinine, CBC every 6 months.

## 2024-04-18 NOTE — NURSING NOTE
Chief Complaint   Patient presents with    RECHECK     Ankylosing spondylitis, unspecified site of spine (H)        Vitals:    04/18/24 1316   BP: 110/74   BP Location: Left arm   Patient Position: Sitting   Cuff Size: Adult Large   Pulse: 83   Resp: 18   SpO2: 99%   Weight: 127.9 kg (282 lb)       Body mass index is 34.33 kg/m .

## 2024-04-22 ASSESSMENT — ANXIETY QUESTIONNAIRES
7. FEELING AFRAID AS IF SOMETHING AWFUL MIGHT HAPPEN: SEVERAL DAYS
7. FEELING AFRAID AS IF SOMETHING AWFUL MIGHT HAPPEN: SEVERAL DAYS
8. IF YOU CHECKED OFF ANY PROBLEMS, HOW DIFFICULT HAVE THESE MADE IT FOR YOU TO DO YOUR WORK, TAKE CARE OF THINGS AT HOME, OR GET ALONG WITH OTHER PEOPLE?: SOMEWHAT DIFFICULT
GAD7 TOTAL SCORE: 8
4. TROUBLE RELAXING: SEVERAL DAYS
2. NOT BEING ABLE TO STOP OR CONTROL WORRYING: SEVERAL DAYS
IF YOU CHECKED OFF ANY PROBLEMS ON THIS QUESTIONNAIRE, HOW DIFFICULT HAVE THESE PROBLEMS MADE IT FOR YOU TO DO YOUR WORK, TAKE CARE OF THINGS AT HOME, OR GET ALONG WITH OTHER PEOPLE: SOMEWHAT DIFFICULT
6. BECOMING EASILY ANNOYED OR IRRITABLE: MORE THAN HALF THE DAYS
1. FEELING NERVOUS, ANXIOUS, OR ON EDGE: SEVERAL DAYS
3. WORRYING TOO MUCH ABOUT DIFFERENT THINGS: MORE THAN HALF THE DAYS
5. BEING SO RESTLESS THAT IT IS HARD TO SIT STILL: NOT AT ALL
GAD7 TOTAL SCORE: 8

## 2024-04-22 ASSESSMENT — PAIN SCALES - PAIN ENJOYMENT GENERAL ACTIVITY SCALE (PEG)
PEG_TOTALSCORE: 4.67
INTERFERED_ENJOYMENT_LIFE: 5
AVG_PAIN_PASTWEEK: 4
INTERFERED_GENERAL_ACTIVITY: 5

## 2024-04-22 NOTE — PROGRESS NOTES
[facility]      Joel Pineda is a 48 year old male who is being evaluated via a billable virtual visit.      VIDEO VISIT   How would you like to obtain your AVS? MyChart  If you are dropped from the video visit, the video invite should be resent to: Text to cell phone: [phone]  Will anyone else be joining your video visit? NO,  Is patient CURRENTLY in [place]? YES  If patient encounters technical issues they should call [phone]    Video-Visit Details  Type of service:  Video Visit  Video Start Time: 8:43 AM   Video End Time: 9:00 AM   Total Face to Face Time: 17 minutes   Originating Location (pt. Location): Home  Distant Location (provider location):  [facility]   Platform used for Video Visit: AmWell          2023    10:10 AM 10/25/2023     6:30 PM 2024     9:50 AM   PHQ-9 SCORE   PHQ[facility]9 [facility] 12 (Moderate depression) 11 (Moderate depression) 16 (Moderately severe depression)   PHQ-9 Total Score 12 11 16           3/19/2024     9:11 AM 2024    10:04 AM 2024    10:44 AM   YUDI-7 SCORE   Total Score 10 (moderate anxiety) 9 (mild anxiety) 8 (mild anxiety)   Total Score 10 9 8           2024     9:08 AM 2024     2:10 PM 2024     8:32 AM   PEG Score   PEG Total Score 6.67 5.67 5.67          CHIEF COMPLAINT: Chronic pain    INTERVAL HISTORY:  Last seen on 24.        Recommendations/plan at the last visit included:  Health Psychology: YES Per Shirley's recommendations  Physical therapy: YES [facility] will fax an order for Orthology for low back pain   30 minutes Video or Clinic follow-up with JOCELYN Zavala NPLeydaC in 2 weeks. Also schedule 2 month video or clinic follow up.   Ok to schedule up to three follow up appts at a time, alternating clinic and video visits.   Labs: Annual UDS/CSA  Medication Management :   Percocet 5/325 m tablet up to three times daily in addition to the chronic Oxycodone.    Toradol 30 mg: IM injection given in clinic.   STOP Tylenol for now.   Take Methocarbamol 500 mg 1-2 tabs up to 4 times per day.     Since last visit:   - PT going well, Knee pain is improved, saw Rheumatology and continue. Started Botox on 4/12/24, had one HA since then. Had previously been told that his migraines were caused by POTS.    - Toradol IM injection precipitated migraine.   - Starting migraine PT through [facility], has eval today.     Pain Information Today: Score: Moderate Pain (5)/10. Location of pain: low back pain    Annual Requirements last collected:  4/8/24     Current Pain Relevant Medications:    Acetaminophen 500 mg BID & with Oxycodone.   Cymbalta 120 mg in AM  Lyrica 300 MG BID   Methocarbamol 500 mg 1-2 QID PRN  Nabumetone 500 mg 1-2 times a day  Lidocaine gel topically 2-3 times daily  Risperdal 0.5 mg 1-2 x daily      Pain Related Controlled Substances:   Clonazepam 0.5 mg, 2 tabs at HS.  Lunesta 3 mg  Oxycodone 5 mg 1-2 tabs per day PRN              Total opiate dose: 7.5-15 MME     Previous Pain Relevant Medications: (H--helped; HI--Helped initially; [facility]--Somewhat helpful; NH--No help; W--worse; SE--side effects; ?--Unsure if helpful)   NOTE: This medication information taken from patient's intake form, not medical records.   Opiates: Oxycodone: H, Tramadol:[facility]. SE, Codeine: NH  NSAIDS: Celebrex: [facility], Nabumetone:H, Naproxen: SE  Anti-migraine medications: Midrin? , Maxalt:H  Muscle Relaxants: Baclofen:[facility], Flexeril:H, Tizaidine:?, Methocarbamol:?  Neuropathics: Lyrica:H  Anti-depressants: Abilify:SE, Brintellix: NH, Wellbutrin: ?, Cymbalta: NH, Nortriptyline: NH, Seroquel:   [facility], Risperdal: NH, Effexor:?, Cymbalta ?  Anxiety medications: Xanax: H, Klonpin: H, lorazepam: H      Topicals: Lidocaine:H  Sleep medications:Belsomra: NH, Melatonin:H, Trazodone NH, Ambien:NH  Other medications not covered above: Humira: All, Enbrel; H, dicyclomine:H, Medrol:[facility], Prednisone:H      Any illicit drug use: denies   EtOH use: none   Caffeine use: 6-8 per day   Nicotine use: None     Past Pain Treatments:   Pain Clinic:   Yes  [facility] pain management: For spinal injections with Dr Diaz, MAPS: injections, nerve ablation, [facility] for SCS treatment.  Did trial but did not find it beneficial.   [facility] chronic pain program.    PT: Yes  For other reasons. Neck, back and feet  Psychologist: Yes ongoing with private therapist.at  [facility].   Chiropractor: Yes years ag              Acupuncture: Yes NH  Pharmacotherapy:  Opioids: Yes  Non-opioids:    Yes   TENs Unit:Yes H for back   Injections: Yes Many for neck and back over the years.      Surgeries related to pain: Yes   October 2007 L5-S1 laminectomy, micro discectomy          THE 4 As OF OPIOID MAINTENANCE ANALGESIA    Analgesia: Is pain relief clinically significant? YES   Activity: Is patient functional and able to perform Activities of Daily Living? YES   Adverse effects: Is patient free from adverse side effects from opiates? YES   Adherence to Rx protocol: Is patient adhering to Controlled Substance Agreement and taking medications ONLY as ordered? YES     Is Narcan prescribed for opiate use >50 MME daily or concurrent use of opiates and benzodiazepines?  Yes    [place] Board of Pharmacy Data Base Reviewed:    YES; As expected, no concern for misuse/abuse of controlled medications based on this report. Reviewed [facility] April 22, 2024- no concerning fills.    _______________________________________________      Physical Exam and Vital Signs not completed due to virtual visit.     General: Verbal, alert and no distress   Psychiatric:  affect and mood normal, mentation appears normal.       DIRE Score for ongoing opioid management is calculated as follows:    Diagnosis = 3 pts (advanced condition; severe pain/objective findings)    Intractability = 3 pts (patient fully engaged but inadequate response to treatments)    Risk        Psych =  3 pts (no significant personality dysfunction/mental illness; good communication with clinic)         Chem Hlth = 3 pts (no history of chemical dependency; not drug-focused)       Reliability = 2 pts (occasional difficulties with compliance; generally reliable)       Social = 2 pts (reduction in some relationships/life rolls)       (Psych + Chem hlth + Reliability + Social) = 16    Efficacy = 2 pts (moderate benefit/function; low med dose; too early/not tried meds)    DIRE Score = 18        7-13: likely NOT suitable candidate for long-term opioid analgesia       14-21: may be a suitable candidate for long-term opioid analgesia     Previous Diagnostic Tests:   Imaging Studies:   MR LUMBAR SPINE W/O CONTRAST 6/27/2019  Impression:   1. Multilevel lumbar spondylosis, most pronounced at L5-S1 with  moderate to severe left and moderate right neural foraminal stenosis as well as narrowing of the left lateral recess and abutment the traversing left S1 nerve root.   2. Additional multilevel degenerative changes of the lumbar spine as described above.     PAIN RELEVANT CONDITIONS:   1.  Postherpetic neuralgia  2.  Ankylosing spondylosis, Lumbar DDD with left S1 nerve involvement, lumbar stenosis  3.  Chronic migraine headaches    ASSESSMENT AND PLAN    (G89.29) Chronic intractable pain  (primary encounter diagnosis)  (M47.816) Spondylosis of lumbar region without myelopathy or radiculopathy  (M79.7) Fibromyalgia  (M54.50) Acute bilateral low back pain without sciatica  Comment: Continue current plan of care. Will check lumbar x-ray for continued pain after fall several weeks ago.   Plan: XR Lumbar Spine G/E 4 Views        (R13.11) Oral phase dysphagia  Comment: Adding historical medication   Plan: pseudoePHEDrine-guaiFENesin (MUCINEX D)          MG 12 hr tablet      (M79.7) Fibromyalgia       PATIENT INSTRUCTIONS:     Diagnosis reviewed, treatment option addressed, and risk/benefits discussed.  Self-care instructions  given.  I am recommending a multidisciplinary treatment plan to help this patient better manage pain.    Remember to request ALL medication refills 5-7 BUSINESS days before you run out.     Health Psychology: YES, Continue per your psychologist's recommendations.  Physical therapy: YES, Continue per therapist's recommendations.   30 minute Video or Clinic follow-up with JOCELYN Zavala NP-C in 2 months or sooner as needed.  Ok to schedule up to three follow up appts at a time, alternating virtual and clinic appointments.   Imaging: X-ray lumbar spine:   [facility]  [phone]  [facility] - [phone]  Medication Management :   Continue current medications.     I have reviewed the note as documented above.  This accurately captures the substance of my conversation with the patient.    BILLING TIME DOCUMENTATION:   TOTAL TIME on the date of service includes:   Time spent preparing to see the patient: 4 minutes (reviewing records and tests)  Time spend face to face with the patient: 17 minutes  Time spent ordering tests, medications, procedures and referrals: 0 minutes  Time spent Referring and communicating with other healthcare professionals: 0 minutes  Documenting clinical information in Epic: 6 minutes    The total TIME spent on this patient on the day of the appointment was 27 minutes.     JOCELYN Padgett NP-C  [facility]    (Information in italics and blue color are taken from previous pain and consulting medical providers notes and are documented as such)

## 2024-04-23 ENCOUNTER — VIRTUAL VISIT (OUTPATIENT)
Dept: PALLIATIVE MEDICINE | Facility: CLINIC | Age: 49
End: 2024-04-23
Payer: MEDICARE

## 2024-04-23 ENCOUNTER — ANCILLARY PROCEDURE (OUTPATIENT)
Dept: GENERAL RADIOLOGY | Facility: CLINIC | Age: 49
End: 2024-04-23
Attending: NURSE PRACTITIONER
Payer: MEDICARE

## 2024-04-23 DIAGNOSIS — M54.50 ACUTE BILATERAL LOW BACK PAIN WITHOUT SCIATICA: ICD-10-CM

## 2024-04-23 DIAGNOSIS — M79.7 FIBROMYALGIA: ICD-10-CM

## 2024-04-23 DIAGNOSIS — M47.816 SPONDYLOSIS OF LUMBAR REGION WITHOUT MYELOPATHY OR RADICULOPATHY: ICD-10-CM

## 2024-04-23 DIAGNOSIS — R13.11 ORAL PHASE DYSPHAGIA: ICD-10-CM

## 2024-04-23 DIAGNOSIS — G89.29 CHRONIC INTRACTABLE PAIN: Primary | ICD-10-CM

## 2024-04-23 PROCEDURE — 96158 HLTH BHV IVNTJ INDIV 1ST 30: CPT | Mod: 95 | Performed by: PSYCHOLOGIST

## 2024-04-23 PROCEDURE — 72110 X-RAY EXAM L-2 SPINE 4/>VWS: CPT | Mod: TC | Performed by: RADIOLOGY

## 2024-04-23 PROCEDURE — G2211 COMPLEX E/M VISIT ADD ON: HCPCS | Mod: 95 | Performed by: NURSE PRACTITIONER

## 2024-04-23 PROCEDURE — 99214 OFFICE O/P EST MOD 30 MIN: CPT | Mod: 95 | Performed by: NURSE PRACTITIONER

## 2024-04-23 PROCEDURE — 96159 HLTH BHV IVNTJ INDIV EA ADDL: CPT | Mod: 95 | Performed by: PSYCHOLOGIST

## 2024-04-23 RX ORDER — GUAIFENESIN, PSEUDOEPHEDRINE HYDROCHLORIDE 600; 60 MG/1; MG/1
1 TABLET, EXTENDED RELEASE ORAL EVERY 12 HOURS
COMMUNITY
Start: 2024-04-23 | End: 2024-05-07

## 2024-04-23 RX ORDER — FLUTICASONE PROPIONATE 50 MCG
2 SPRAY, SUSPENSION (ML) NASAL ONCE
COMMUNITY
End: 2024-05-07

## 2024-04-23 ASSESSMENT — PAIN SCALES - GENERAL: PAINLEVEL: MODERATE PAIN (5)

## 2024-04-23 NOTE — PROGRESS NOTES
Joel Pineda is a 48 year old male who is being evaluated via a billable video visit.      Patient is currently in the [facility]? yes    Patient would like the video invitation sent by: Text to cell phone: [phone]}    Video Start Time: 3:00 PM  Video Stop Time: 3:54 PM    Additional provider notes:     Pain Diagnoses per pain provider:   Chronic intractable pain    Spondylosis of lumbar region without myelopathy or radiculopathy    Fibromyalgia        DATA: During today's visit you reported the following: Your chronic pain is still higher than you'd like, continues to be high after fall earlier this month. Your mood is overall stable - some ongoing anxiety and frustration about vocal issues. Your activity level is mildly increased. Your stress level is mildly increased - NONA and new management agreement, colonoscopy prep, vocal procedure. Your sleep remains fairly stable. You reported engaging in self-care for your pain 2-3 times daily.    You identified that you would like to focus on the following or had questions regarding the following issues or concerns, and we discussed the following:   - several medical visits today  - update from visit with JOCELYN Zavala CNP   - double-prep for colonoscopy  - annual LTD update was due - all data disappeared from spreadsheet you utilize - problem solved solution  - update on vocal issues - see ENT again in May  - worried you have compartmentalized 'too much' - explored the reality, which is that it is ok to compartmentalize as you do tend to come back to items you've compartmentalized which is different than in the past ways you coped    ASSESSMENT: Tito continues to experience higher pain after his fall, however is working on being less judgmental about how his body might respond to certain exercises to increase his comfort level - e.g., not labeling a stretch as easy then being judgmental when it causes pain, rather accepting that right now some  movements might not be 'easy.' He seems to have awareness of how this negatively impacts his pain and mood.     PLAN:   Your next appointment is scheduled for 5/9 at 3:00 PM.  Assignment/Objectives /interventions for next session:   - continue to re-frame self-judgments and focus on self-care    We believe regular attendance is key to your success in our program!    Any time you are unable to keep your appointment we ask that you call us at [phone] at least 24 hours in advance to cancel.This will allow us to offer the appointment time to another patient.   Multiple missed appointments may lead to dismissal from the clinic.    Telemedicine Visit: The patient's condition can be safely assessed and treated via synchronous audio and visual telemedicine encounter.      Reason for Telemedicine Visit: Services only offered telehealth    Originating Site (Patient Location): Patient's home    Distant Site (Provider Location): Provider Remote Setting- Home Office    Consent:  The patient/guardian has verbally consented to: the potential risks and benefits of telemedicine (video visit) versus in person care; bill my insurance or make self-payment for services provided; and responsibility for payment of non-covered services.     Mode of Communication:  Video Conference via [employer]    As the provider I attest to compliance with applicable laws and regulations related to telemedicine.      Shirley Bartlett PsyD LP  Licensed Psychologist  Outpatient Clinic Therapist  [facility]

## 2024-04-23 NOTE — PATIENT INSTRUCTIONS
After Visit Instructions:     Thank you for coming to [facility] for your care. It is my goal to partner with you to help you reach your optimal state of health.   Continue daily self-care, identifying contributing factors, and monitoring variations in pain level. Continue to integrate self-care into your life.      Health Psychology: YES, Continue per your psychologist's recommendations.  Physical therapy: YES, Continue per therapist's recommendations.   30 minute Video or Clinic follow-up with JOCELYN Zavala NP-C in 2 months or sooner as needed.  Ok to schedule up to three follow up appts at a time, alternating virtual and clinic appointments.   Imaging: X-ray lumbar spine:   [facility]  [phone]  [facility] - [phone]  Medication Management :   Continue current medications.       JOCELYN Padgett, NP-C  [facility]  [facility] - Monday, Thursday and Friday  [facility] - Tuesday      Be sure to request ALL medication refills 5 days prior to the due date whether or not you will see your medical provider in an appointment before the due date.      Do not expect same day refills. If you do not plan ahead you may run out of medications.     Early refills are not provided.  It is your responsibility to manage your medications responsibility and keep them safely stored. Lost or destroyed medications WILL NOT be replaced    Scheduling/Clinic telephone Number for ALL locations:  [phone]    After Hours On-Call Service:  [phone]    Call with any questions about your care and for scheduling assistance.   Calls are returned Monday through Friday between 8 AM and 4:00 PM. We usually get back to you within 2 business days depending on the issue/request.    If we are prescribing your medications:  For opioid medication refills, call the clinic or send a Nan[employer]af message 7 days in advance.  Please include:  Your name and date of birth.    Name of requested medication  Name of the pharmacy.  For non-opioid medications, call your pharmacy directly to request a refill. Please allow 3-4 days to be processed.   Per [place] State Law:  All controlled substance prescriptions must be filled within 30 days of being written.    For those controlled substances allowing refills, pickup must occur within 30 days of last fill.      We believe regular attendance is key to your success in our program!    Any time you are unable to keep your appointment we ask that you call us at least 24 hours in advance to cancel.This will allow us to offer the appointment time to another patient.   Multiple missed appointments may lead to dismissal from the clinic.

## 2024-04-29 ASSESSMENT — PAIN SCALES - PAIN ENJOYMENT GENERAL ACTIVITY SCALE (PEG)
PEG_TOTALSCORE: 4
INTERFERED_GENERAL_ACTIVITY: 4
INTERFERED_ENJOYMENT_LIFE: 4
AVG_PAIN_PASTWEEK: 4

## 2024-05-01 ENCOUNTER — TRANSFERRED RECORDS (OUTPATIENT)
Dept: HEALTH INFORMATION MANAGEMENT | Facility: CLINIC | Age: 49
End: 2024-05-01
Payer: MEDICARE

## 2024-05-02 ENCOUNTER — E-VISIT (OUTPATIENT)
Dept: FAMILY MEDICINE | Facility: CLINIC | Age: 49
End: 2024-05-02
Payer: MEDICARE

## 2024-05-02 DIAGNOSIS — M45.8 ANKYLOSING SPONDYLITIS OF SACRAL REGION (H): ICD-10-CM

## 2024-05-02 DIAGNOSIS — G57.12 MERALGIA PARESTHETICA, LEFT LOWER LIMB: ICD-10-CM

## 2024-05-02 DIAGNOSIS — E08.42 DIABETIC POLYNEUROPATHY ASSOCIATED WITH DIABETES MELLITUS DUE TO UNDERLYING CONDITION (H): ICD-10-CM

## 2024-05-02 DIAGNOSIS — M48.02 CERVICAL STENOSIS OF SPINAL CANAL: ICD-10-CM

## 2024-05-02 DIAGNOSIS — G89.29 CHRONIC INTRACTABLE PAIN: ICD-10-CM

## 2024-05-02 DIAGNOSIS — M79.18 MYOFASCIAL MUSCLE PAIN: ICD-10-CM

## 2024-05-02 DIAGNOSIS — M51.369 DDD (DEGENERATIVE DISC DISEASE), LUMBAR: ICD-10-CM

## 2024-05-02 PROCEDURE — 99421 OL DIG E/M SVC 5-10 MIN: CPT | Performed by: FAMILY MEDICINE

## 2024-05-02 RX ORDER — NABUMETONE 500 MG/1
500-1000 TABLET, FILM COATED ORAL 2 TIMES DAILY PRN
Qty: 120 TABLET | Refills: 1 | Status: SHIPPED | OUTPATIENT
Start: 2024-05-02

## 2024-05-02 RX ORDER — METHOCARBAMOL 500 MG/1
TABLET, FILM COATED ORAL
Qty: 240 TABLET | Refills: 1 | Status: SHIPPED | OUTPATIENT
Start: 2024-05-02 | End: 2024-09-30

## 2024-05-02 RX ORDER — PREGABALIN 150 MG/1
150 CAPSULE ORAL 3 TIMES DAILY
Qty: 270 CAPSULE | Refills: 1 | Status: ON HOLD | OUTPATIENT
Start: 2024-05-02 | End: 2024-09-25

## 2024-05-03 DIAGNOSIS — I10 ESSENTIAL HYPERTENSION WITH GOAL BLOOD PRESSURE LESS THAN 140/90: ICD-10-CM

## 2024-05-03 DIAGNOSIS — E78.1 HYPERTRIGLYCERIDEMIA: ICD-10-CM

## 2024-05-06 ENCOUNTER — OFFICE VISIT (OUTPATIENT)
Dept: PALLIATIVE MEDICINE | Facility: CLINIC | Age: 49
End: 2024-05-06
Attending: NURSE PRACTITIONER
Payer: MEDICARE

## 2024-05-06 VITALS — SYSTOLIC BLOOD PRESSURE: 127 MMHG | DIASTOLIC BLOOD PRESSURE: 84 MMHG | HEART RATE: 64 BPM

## 2024-05-06 DIAGNOSIS — G57.13 MERALGIA PARESTHETICA OF BOTH LOWER EXTREMITIES: Primary | ICD-10-CM

## 2024-05-06 DIAGNOSIS — G57.12 MERALGIA PARESTHETICA, LEFT LOWER LIMB: ICD-10-CM

## 2024-05-06 PROCEDURE — 64450 NJX AA&/STRD OTHER PN/BRANCH: CPT | Mod: 50 | Performed by: PAIN MEDICINE

## 2024-05-06 RX ORDER — FENOFIBRATE 48 MG/1
48 TABLET, COATED ORAL DAILY
Qty: 90 TABLET | Refills: 0 | Status: SHIPPED | OUTPATIENT
Start: 2024-05-06 | End: 2024-07-29

## 2024-05-06 RX ORDER — METOPROLOL SUCCINATE 100 MG/1
TABLET, EXTENDED RELEASE ORAL
Qty: 90 TABLET | Refills: 0 | Status: SHIPPED | OUTPATIENT
Start: 2024-05-06 | End: 2024-07-29

## 2024-05-06 RX ADMIN — BUPIVACAINE HYDROCHLORIDE 25 MG: 2.5 INJECTION, SOLUTION EPIDURAL; INFILTRATION; INTRACAUDAL at 15:16

## 2024-05-06 ASSESSMENT — PAIN SCALES - GENERAL: PAINLEVEL: MODERATE PAIN (4)

## 2024-05-06 NOTE — PROGRESS NOTES
Medication Therapy Management (MTM) Encounter    ASSESSMENT:                            Medication Adherence/Access: No issues reported    Pain/Constipation: Stable. Plan in place with Pain Clinic.    Diabetes: Stable. meeting A1c goal of less than 7%. Follows with endocrinology.     Asthma: Stable.     Mood/Insomnia: Stable. Follows closely with psychiatry.    Migraine:  Stable    PLAN:                            No medication changes recommended today.    Follow-up: 3 months    SUBJECTIVE/OBJECTIVE:                          Joel Pineda is a 48 year old male called for a follow up visit. He was referred to me from Ksenia Lyles. Follow up from 2/6/2024.    Reason for visit: Medication Review.    Allergies/ADRs: Reviewed in chart  Past Medical History: Reviewed in chart  Tobacco: He reports that he has never smoked. He has never used smokeless tobacco.  Alcohol: none  Caffeine: 4-5 cups of coffee/day  Activity: None    Medication Adherence/Access: no issues reported  Patient uses pill box(es).  Patient qualifies for [employer] program for Enbrel (delivers to his house) and Ozempic (ships to [facility]ocr[facility]ology [facility])    Pain/Constipation:  Oxycodone 5mg can take up to two times daily as needed for pain-takes about 10mg a day, gets 60 tablets  per month  Pregabalin 150mg three times daily-300mg in the morning and 150mg at night. Gets more brain fog and fatigue with higher doses.  Lidocaine 5% ointment twice daily as needed  Methocarbamol 500-1000mg four time daily(usually 1 three time daily)  Nabumetone 500-1000mg twice daily as needed (usually taking 1 twice daily-holding while on prednisone)   Acetaminophen 500mg three times daily  Lidocaine 4% patch uses as needed  Docusate 100mg twice daily  Miralax 1 capful daily    Has been working with Pain Management (provider, therapist)  in [place] and feel this has been helpful. Was taking Movantik and Amitiza but they were too effective so he has been using  miralax daily and that has been helpful.    Diabetes/Obesity:   Ozempic 1mg weekly.  Has been tolerating ok with his gastroparesis.  SMBG: rarely.    Recent symptoms of low blood sugar? Does set his alarm to remind him to eat.   Recent symptoms of high blood sugar? none  Eye exam: up to date  Foot exam: up to date  ACEi/ARB: Yes: Ramipril.   Aspirin: Yes  Lab Results   Component Value Date    MICROL 73.0 12/12/2023    MICROL 9 09/23/2022    MICROL 8 10/14/2020     POCT A1c was 5.3% in December  Weight loss 320 to 280lbs.  Discussed with endocrinology about increasing Ozempic dose to 2mg but decided to stay at 1mg with his low A1c.    Asthma:   Wixela 100-50mcg 1 puff twice a day (Symbicort, Stiolo and Advair discus caused hoarse voice)  Montelukast (Singulair) once daily  Short-Acting Bronchodilator: Albuterol MDI- using when he goes out into cold air  Triggers include: cold air and pollutants.   Patient reports the following symptoms: None  Is following up with ENT and neurology because of his hoarse voice. Inhaled steroid is not causing this.  It was found he has a gap in vocal folds and may need a procedure done. Has been using nebulized saline to help with this and has a follow up with ENT later this week.  Asthma Action Plan on file: Yes      8/3/2023     7:21 PM 9/6/2023     9:00 AM 2/29/2024     9:53 AM   ACT Total Scores   ACT TOTAL SCORE (Goal Greater than or Equal to 20) 20 23 23   In the past 12 months, how many times did you visit the emergency room for your asthma without being admitted to the hospital? 0 0 0   In the past 12 months, how many times were you hospitalized overnight because of your asthma? 0 0 0     Mood/Insomnia:  Duloxetine 120mg daily  Bupropion XL 300mg daily  Risperidone 0.5mg twice daily as needed-taking four times a week  Clonazepam 1mg at bedtime for RBD  Lunesta 3mg at bedtime  Melatonin 13mg nightly as needed    Feels mood is moving in the right direction. Follows  with Dr. NONI rodriguez  [facility]. Therapy through pain management.  Sleep has been ok. 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     Migraine:   Preventive medications  Botox  Acute medications  Ubrelvy-hasn't had to take yet      Works with [facility] Neurology. Neurology thought he was having rebound headaches from maxalt, acetaminophen, relafen and/or robaxin. Maxalt was discontinued and started on Botox and Ubrelvy. Has not had to use Ubrelvy and has not had any headaches since starting botox and discontinuing maxalt.    Today's Vitals: There were no vitals taken for this visit.  ----------------    I spent 23 minutes with this patient today. All changes were made via collaborative practice agreement with Ksenia Lyles. A copy of the visit note was provided to the patient's provider(s).    A summary of these recommendations was sent via [email or web address].    Radha Mcdonald, Pharm.D, BCACP  Medication Therapy Management Pharmacist    Telemedicine Visit Details  Type of service:  Telephone visit  Start Time: 11:02AM  End Time: 11:25AM     Medication Therapy Recommendations  No medication therapy recommendations to display

## 2024-05-06 NOTE — PROGRESS NOTES
Diagnoses and all orders for this visit:       Bilateral Meralgia paresthetica       Current Procedure: bilateral Lateral femoral cutaneous nerve block  Current Indication (include preoperative):  Alleviation of pain      REASON FOR REFERRAL: Anterior lateral thigh pain and burning  Sonographic guidance will be used to ensure accurate placement.  PATIENT EDUCATION:  Ready to learn with no apparent learning barriers identified.  Learning preferences include listening. Explained diagnosis and treatment plan as well as treatment alternatives. Patient expressed understanding of the content.  Following denial of allergy and review of potential side effects and complications including but not necessarily limited to infection, bleeding, allergic reaction, post-injection flare, local tissue breakdown, injury to soft tissue and/or nerves and seizure, patient indicated their understanding and agreed to proceed. A written consent was obtained and is scanned into the chart. Written and signed consent obtained and is scanned into the chart.  PROCEDURE:  Prior to the procedure,a 12 MHz linear transducer was used to visualize the abdominal/groin musculature to determine the approach for the procedure.  Procedure was carried out using sterile technique including Chloraprep scrub, a sterile transducer cover, and sterile transducer gel. A simple surgical tray was used.  PROCEDURAL PAUSE:  Procedural pause conducted to verify correct patient identity, procedure to be performed, and as applicable, correct side/site, correct patient position, availability of implants, special equipment, or special requirements.  Patient position:  Supine  Transducer type:  12 MHz linear array transducer  Approach:  Medial to lateral parallel to long axis of transducer  Local Anesthesia:  25 gauge 2.5 inch needle was used to anesthetize the skin, subcutaneous tissue  with 5 ml of 1% Lidocaine  Injection: After confirming needle tip position, syringe was  replaced with one containing 1 ml of 0.25% Bupivacaine which was injected and seen hydodissecting the sartorius and tensor fascia florida bilaterally.  Needle was removed bandage placed over the wound.  AFTERCARE:  Patient tolerated the procedure without complication. After a short observation period, the patient was discharged under their own power and in excellent condition.  Pain noted to be a 8/10 before completion of the procedure and 0/10 after completion of the procedure.      Injection solution contained:  2ml of 0.5% bupivacaine              Bubba Montgomery MD  [facility]

## 2024-05-07 ENCOUNTER — VIRTUAL VISIT (OUTPATIENT)
Dept: PHARMACY | Facility: CLINIC | Age: 49
End: 2024-05-07
Payer: COMMERCIAL

## 2024-05-07 DIAGNOSIS — J45.30 MILD PERSISTENT ASTHMA WITHOUT COMPLICATION: ICD-10-CM

## 2024-05-07 DIAGNOSIS — G43.819 OTHER MIGRAINE WITHOUT STATUS MIGRAINOSUS, INTRACTABLE: ICD-10-CM

## 2024-05-07 DIAGNOSIS — G89.4 CHRONIC PAIN SYNDROME: ICD-10-CM

## 2024-05-07 DIAGNOSIS — E11.9 TYPE 2 DIABETES MELLITUS WITHOUT COMPLICATION, WITHOUT LONG-TERM CURRENT USE OF INSULIN (H): Primary | ICD-10-CM

## 2024-05-07 DIAGNOSIS — F41.8 DEPRESSION WITH ANXIETY: ICD-10-CM

## 2024-05-07 DIAGNOSIS — K59.03 DRUG-INDUCED CONSTIPATION: ICD-10-CM

## 2024-05-07 DIAGNOSIS — G47.00 INSOMNIA, UNSPECIFIED TYPE: ICD-10-CM

## 2024-05-07 PROCEDURE — 99207 PR NO CHARGE LOS: CPT | Mod: 93 | Performed by: PHARMACIST

## 2024-05-07 RX ORDER — SODIUM CHLORIDE FOR INHALATION 0.9 %
3 VIAL, NEBULIZER (ML) INHALATION
COMMUNITY
Start: 2024-04-26

## 2024-05-07 RX ORDER — FLUTICASONE PROPIONATE 50 MCG
2 SPRAY, SUSPENSION (ML) NASAL EVERY EVENING
COMMUNITY
Start: 2024-05-07

## 2024-05-07 RX ORDER — GUAIFENESIN 600 MG/1
1200 TABLET, EXTENDED RELEASE ORAL 2 TIMES DAILY
COMMUNITY
End: 2024-05-07

## 2024-05-07 RX ORDER — GUAIFENESIN 600 MG/1
600 TABLET, EXTENDED RELEASE ORAL DAILY
COMMUNITY
Start: 2024-05-07 | End: 2024-09-16

## 2024-05-07 RX ORDER — UBROGEPANT 100 MG/1
100 TABLET ORAL
COMMUNITY
Start: 2024-04-09

## 2024-05-07 NOTE — PATIENT INSTRUCTIONS
"Recommendations from today's MTM visit:                                                         No medication changes recommended today.    Follow-up: 3 months    It was great speaking with you today.  I value your experience and would be very thankful for your time in providing feedback in our clinic survey. In the next few days, you may receive an email or text message from [facility] with a link to a survey related to your  clinical pharmacist.\"     To schedule another MTM appointment, please call the clinic directly or you may call the MTM scheduling line at [phone] or toll-free at [phone].     My Clinical Pharmacist's contact information:                                                      Please feel free to contact me with any questions or concerns you have.      Radha Mcdonald, Pharm.D, [facility]  Medication Therapy Management Pharmacist      "

## 2024-05-08 ENCOUNTER — CARE COORDINATION (OUTPATIENT)
Dept: SLEEP MEDICINE | Facility: CLINIC | Age: 49
End: 2024-05-08
Payer: MEDICARE

## 2024-05-08 NOTE — TELEPHONE ENCOUNTER
Left voice message to call back and schedule a follow up with Dr. Damico before his 9/25/24 appointment.

## 2024-05-08 NOTE — PROGRESS NOTES
Tito called back and scheduled a follow up with Dr. Damico for 7/15/24 and kept September appointment in case he needed it. Told him to cancel in July if not needed.

## 2024-05-09 ENCOUNTER — VIRTUAL VISIT (OUTPATIENT)
Dept: PALLIATIVE MEDICINE | Facility: CLINIC | Age: 49
End: 2024-05-09
Payer: MEDICARE

## 2024-05-09 DIAGNOSIS — M79.7 FIBROMYALGIA: ICD-10-CM

## 2024-05-09 DIAGNOSIS — M47.816 SPONDYLOSIS OF LUMBAR REGION WITHOUT MYELOPATHY OR RADICULOPATHY: ICD-10-CM

## 2024-05-09 DIAGNOSIS — G89.29 CHRONIC INTRACTABLE PAIN: Primary | ICD-10-CM

## 2024-05-09 PROCEDURE — 96159 HLTH BHV IVNTJ INDIV EA ADDL: CPT | Mod: 95 | Performed by: PSYCHOLOGIST

## 2024-05-09 PROCEDURE — 96158 HLTH BHV IVNTJ INDIV 1ST 30: CPT | Mod: 95 | Performed by: PSYCHOLOGIST

## 2024-05-09 NOTE — PROGRESS NOTES
Joel Pineda is a 48 year old male who is being evaluated via a billable video visit.      Patient is currently in the [facility]? yes    Patient would like the video invitation sent by: Text to cell phone: [phone]}    Video Start Time: 3:00 PM  Video Stop Time: 3:53 PM    Additional provider notes:     Pain Diagnoses per pain provider:   Chronic intractable pain     Spondylosis of lumbar region without myelopathy or radiculopathy     Fibromyalgia            DATA: During today's visit you reported the following: Your chronic pain is somewhat improved in mid-back and low back - using pillow between legs for sleep but just over the last 4 days. Your mood is about the same. Your activity level is the same. Your stress level is mildly increased - NONA, brain fog. Your sleep is mildly improved. You reported engaging in self-care for your pain 2-3 times daily.    You identified that you would like to focus on the following or had questions regarding the following issues or concerns, and we discussed the following:   - going forward with injections in vocal folds   - new management company transition from former one  - discussed making sure to follow directions post-injection  - some concern about not being able to take certain medications for 2 weeks prior to surgery including Embrel   - plan to resume PT but perhaps on less strenuous   - update on colonoscopy last week, believe medication prescribed for opiate induced constipation was causing issues and discontinued it with resolution of problems  - report increased brain fog, not sure if this is due to AS or fibromyalgia    ASSESSMENT: Tito reports pain has been more variable, however he is hopeful this will improve as he has been using a pillow for additional support while sleeping and noting benefit. He reports some anticipatory anxiety, however responded well to reframing and exploring ways to respond.    PLAN:   Your next appointment is scheduled  for 5/21 at 3:00 PM.  Assignment/Objectives /interventions for next session:   - continue to focus on self-care    We believe regular attendance is key to your success in our program!    Any time you are unable to keep your appointment we ask that you call us at [phone] at least 24 hours in advance to cancel.This will allow us to offer the appointment time to another patient.   Multiple missed appointments may lead to dismissal from the clinic.    Telemedicine Visit: The patient's condition can be safely assessed and treated via synchronous audio and visual telemedicine encounter.      Reason for Telemedicine Visit: Services only offered telehealth    Originating Site (Patient Location): Patient's home    Distant Site (Provider Location): Provider Remote Setting- Home Office    Consent:  The patient/guardian has verbally consented to: the potential risks and benefits of telemedicine (video visit) versus in person care; bill my insurance or make self-payment for services provided; and responsibility for payment of non-covered services.     Mode of Communication:  Video Conference via [employer]    As the provider I attest to compliance with applicable laws and regulations related to telemedicine.      Shirley Bartlett PsyD LP  Licensed Psychologist  Outpatient Clinic Therapist  [facility]

## 2024-05-13 RX ORDER — BUPIVACAINE HYDROCHLORIDE 2.5 MG/ML
10 INJECTION, SOLUTION EPIDURAL; INFILTRATION; INTRACAUDAL ONCE
Status: COMPLETED | OUTPATIENT
Start: 2024-05-06 | End: 2024-05-06

## 2024-05-15 ENCOUNTER — DOCUMENTATION ONLY (OUTPATIENT)
Dept: ENDOCRINOLOGY | Facility: CLINIC | Age: 49
End: 2024-05-15
Payer: MEDICARE

## 2024-05-15 NOTE — PROGRESS NOTES
[employer] [facility] PAP refill form received. Form completed, signed and faxed back to [employer].    Completion of transmission verified via RightFax.       Ksenia Hanson CMA  Adult Endocrinology  [facility]

## 2024-05-17 DIAGNOSIS — J45.30 MILD PERSISTENT ASTHMA: ICD-10-CM

## 2024-05-19 DIAGNOSIS — E53.8 B12 DEFICIENCY: ICD-10-CM

## 2024-05-19 RX ORDER — SYRINGE WITH NEEDLE, 1 ML 25GX5/8"
SYRINGE, EMPTY DISPOSABLE MISCELLANEOUS
Qty: 12 EACH | Refills: 0 | Status: SHIPPED | OUTPATIENT
Start: 2024-05-19

## 2024-05-20 ENCOUNTER — MYC MEDICAL ADVICE (OUTPATIENT)
Dept: PALLIATIVE MEDICINE | Facility: CLINIC | Age: 49
End: 2024-05-20
Payer: MEDICARE

## 2024-05-20 ENCOUNTER — MYC REFILL (OUTPATIENT)
Dept: FAMILY MEDICINE | Facility: CLINIC | Age: 49
End: 2024-05-20
Payer: MEDICARE

## 2024-05-20 DIAGNOSIS — M47.816 SPONDYLOSIS OF LUMBAR REGION WITHOUT MYELOPATHY OR RADICULOPATHY: ICD-10-CM

## 2024-05-20 DIAGNOSIS — G62.9 PERIPHERAL POLYNEUROPATHY: ICD-10-CM

## 2024-05-20 DIAGNOSIS — G43.111 INTRACTABLE MIGRAINE WITH AURA WITH STATUS MIGRAINOSUS: ICD-10-CM

## 2024-05-20 DIAGNOSIS — E53.8 B12 DEFICIENCY: ICD-10-CM

## 2024-05-20 DIAGNOSIS — M45.8 ANKYLOSING SPONDYLITIS OF SACRAL REGION (H): ICD-10-CM

## 2024-05-20 DIAGNOSIS — M51.369 DDD (DEGENERATIVE DISC DISEASE), LUMBAR: ICD-10-CM

## 2024-05-20 DIAGNOSIS — G89.29 CHRONIC INTRACTABLE PAIN: ICD-10-CM

## 2024-05-20 RX ORDER — SYRINGE WITH NEEDLE, 1 ML 25GX5/8"
SYRINGE, EMPTY DISPOSABLE MISCELLANEOUS
Qty: 12 EACH | Refills: 0 | OUTPATIENT
Start: 2024-05-20

## 2024-05-20 RX ORDER — MONTELUKAST SODIUM 10 MG/1
TABLET ORAL
Qty: 90 TABLET | Refills: 0 | Status: SHIPPED | OUTPATIENT
Start: 2024-05-20 | End: 2024-08-19

## 2024-05-20 NOTE — TELEPHONE ENCOUNTER
montelukast (SINGULAIR) 10 MG tablet 90 tablet 3 5/30/2023 -- No   Sig: TAKE ONE TABLET BY MOUTH EVERY EVENING           Last Office Visit: 6/08/23  Future Office visit:    Next 5 appointments (look out 90 days)      Aug 06, 2024 11:00 AM  Pharmacist Visit with Radha Mcdonald RPH  [facility] ([facility] ) [address] [place] [address] [address]  [address] [id number] [phone]             Routing refill request to provider for review/approval because:  NA, passed protocol.

## 2024-05-20 NOTE — TELEPHONE ENCOUNTER
Please process a refill of oxycodone 5 MG  to     [id number] - [place], [place] - [address]  [address]  [place] [address]  Phone: [phone] Fax: [phone]

## 2024-05-20 NOTE — TELEPHONE ENCOUNTER
Received [employer] message from patient requesting refill(s) for:    oxyCODONE (ROXICODONE) 5 MG tablet      Last dispensed from pharmacy on 4/26/24    Patient's last office/virtual visit by prescribing provider on 4/23/24  Next office/virtual appointment scheduled for 6/17/24    Last urine drug screen date 4/8/24  Current opioid agreement on file? Yes Date of opioid agreement: 4/8/24    E-prescribe to:    [facility] # [id number] - [place], [place] - [address]    Will route to nursing [place] for review and preparation of prescription(s).

## 2024-05-21 ENCOUNTER — VIRTUAL VISIT (OUTPATIENT)
Dept: PALLIATIVE MEDICINE | Facility: CLINIC | Age: 49
End: 2024-05-21
Payer: MEDICARE

## 2024-05-21 DIAGNOSIS — G89.29 CHRONIC INTRACTABLE PAIN: Primary | ICD-10-CM

## 2024-05-21 DIAGNOSIS — M47.816 SPONDYLOSIS OF LUMBAR REGION WITHOUT MYELOPATHY OR RADICULOPATHY: ICD-10-CM

## 2024-05-21 DIAGNOSIS — M79.7 FIBROMYALGIA: ICD-10-CM

## 2024-05-21 PROCEDURE — 96158 HLTH BHV IVNTJ INDIV 1ST 30: CPT | Mod: 95 | Performed by: PSYCHOLOGIST

## 2024-05-21 PROCEDURE — 96159 HLTH BHV IVNTJ INDIV EA ADDL: CPT | Mod: 95 | Performed by: PSYCHOLOGIST

## 2024-05-21 NOTE — PROGRESS NOTES
Joel Pineda is a 48 year old male who is being evaluated via a billable video visit.      Patient is currently in the [facility]? yes    Patient would like the video invitation sent by: Text to cell phone: [phone]}    Video Start Time: 3:00 PM  Video Stop Time: 3:53 PM    Additional provider notes:     Pain Diagnoses per pain provider:    Chronic intractable pain     Spondylosis of lumbar region without myelopathy or radiculopathy     Fibromyalgia                DATA: During today's visit you reported the following: Your back and fibromyalgia pain 'has been a challenge' since stopping Enbrel in anticipation of surgery. Your mood is 'worse because my voice is getting worse'. Your activity level is mildly decreased. 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 Your sleep is overall stable. You reported engaging in self-care for your pain 2-3 times daily.    You identified that you would like to focus on the following or had questions regarding the following issues or concerns, and we discussed the following:   - vocal cord injection next week - feeling understandably anxious  - wonder if you have folliculitis on clavicle vs shingles, but deny pain - encouraged to keep an eye on it and contact PCP if it worsens or you develop pain  - can resume Enbrel one week after procedure, also planning to hold Ozempic for same duration  - will likely require repeat vocal cord injections  - 'wonder if I like being miserable' - explored that this is likely that you experience some comfort in the 'known' zone of misery  - discussed that in life pain is inevitable, suffering is optional   - discussed likely culprit of increased pain is having to hold medications in anticipation of surgery  - explored activities to engage in as you are recovering from surgery    ASSESSMENT: Tito reports pain is  higher which is likely due to discontinuing some of his medications prior to vocal cord injections as well as due to higher stress than usual. He is hopeful the injections will be successful, however is finding it challenging to hold hope; discussed this could be due to increased overall pain, as well as stress.    PLAN:   Your next appointment is scheduled for 6/4 at 3:00 PM.  Assignment/Objectives /interventions for next session:   - focus on self-care  - continue to explore radical acceptance of situations    We believe regular attendance is key to your success in our program!    Any time you are unable to keep your appointment we ask that you call us at [phone] at least 24 hours in advance to cancel.This will allow us to offer the appointment time to another patient.   Multiple missed appointments may lead to dismissal from the clinic.    Telemedicine Visit: The patient's condition can be safely assessed and treated via synchronous audio and visual telemedicine encounter.      Reason for Telemedicine Visit: Services only offered telehealth    Originating Site (Patient Location): Patient's home    Distant Site (Provider Location): Provider Remote Setting- Home Office    Consent:  The patient/guardian has verbally consented to: the potential risks and benefits of telemedicine (video visit) versus in person care; bill my insurance or make self-payment for services provided; and responsibility for payment of non-covered services.     Mode of Communication:  Video Conference via [employer]    As the provider I attest to compliance with applicable laws and regulations related to telemedicine.      Shirley Bartlett PsyD LP  Licensed Psychologist  Outpatient Clinic Therapist  [facility]

## 2024-05-22 RX ORDER — OXYCODONE HYDROCHLORIDE 5 MG/1
5 TABLET ORAL 2 TIMES DAILY PRN
Qty: 60 TABLET | Refills: 0 | Status: SHIPPED | OUTPATIENT
Start: 2024-05-22 | End: 2024-06-17

## 2024-05-22 NOTE — TELEPHONE ENCOUNTER
Medication refill information reviewed.     Due date for oxyCODONE (ROXICODONE) 5 MG tablet  is 05/26/24     Prescriptions prepped for review.     Will route to provider.

## 2024-05-24 DIAGNOSIS — G43.109 MIGRAINE WITH AURA AND WITHOUT STATUS MIGRAINOSUS, NOT INTRACTABLE: ICD-10-CM

## 2024-05-24 RX ORDER — TOPIRAMATE 25 MG/1
TABLET, FILM COATED ORAL
Qty: 90 TABLET | Refills: 0 | OUTPATIENT
Start: 2024-05-24

## 2024-05-24 RX ORDER — TOPIRAMATE 50 MG/1
50 TABLET, FILM COATED ORAL 2 TIMES DAILY
Qty: 180 TABLET | Refills: 0 | Status: SHIPPED | OUTPATIENT
Start: 2024-05-24 | End: 2024-05-29

## 2024-05-28 ENCOUNTER — OFFICE VISIT (OUTPATIENT)
Dept: OPHTHALMOLOGY | Facility: CLINIC | Age: 49
End: 2024-05-28
Payer: MEDICARE

## 2024-05-28 DIAGNOSIS — H52.13 MYOPIA OF BOTH EYES WITH ASTIGMATISM AND PRESBYOPIA: ICD-10-CM

## 2024-05-28 DIAGNOSIS — H04.129 DRY EYE: ICD-10-CM

## 2024-05-28 DIAGNOSIS — M45.8 ANKYLOSING SPONDYLITIS OF SACRAL REGION (H): ICD-10-CM

## 2024-05-28 DIAGNOSIS — E11.9 TYPE 2 DIABETES MELLITUS WITHOUT RETINOPATHY (H): Primary | ICD-10-CM

## 2024-05-28 DIAGNOSIS — H52.203 MYOPIA OF BOTH EYES WITH ASTIGMATISM AND PRESBYOPIA: ICD-10-CM

## 2024-05-28 DIAGNOSIS — H52.4 MYOPIA OF BOTH EYES WITH ASTIGMATISM AND PRESBYOPIA: ICD-10-CM

## 2024-05-28 DIAGNOSIS — G43.709 CHRONIC MIGRAINE W/O AURA W/O STATUS MIGRAINOSUS, NOT INTRACTABLE: ICD-10-CM

## 2024-05-28 DIAGNOSIS — H10.13 ALLERGIC CONJUNCTIVITIS OF BOTH EYES: ICD-10-CM

## 2024-05-28 PROCEDURE — 92014 COMPRE OPH EXAM EST PT 1/>: CPT | Performed by: OPHTHALMOLOGY

## 2024-05-28 PROCEDURE — 92015 DETERMINE REFRACTIVE STATE: CPT | Performed by: OPHTHALMOLOGY

## 2024-05-28 ASSESSMENT — CONF VISUAL FIELD
METHOD: COUNTING FINGERS
OS_SUPERIOR_NASAL_RESTRICTION: 0
OS_INFERIOR_NASAL_RESTRICTION: 0
OD_SUPERIOR_TEMPORAL_RESTRICTION: 0
OD_SUPERIOR_NASAL_RESTRICTION: 0
OD_INFERIOR_NASAL_RESTRICTION: 0
OS_SUPERIOR_TEMPORAL_RESTRICTION: 0
OD_NORMAL: 1
OS_INFERIOR_TEMPORAL_RESTRICTION: 0
OD_INFERIOR_TEMPORAL_RESTRICTION: 0
OS_NORMAL: 1

## 2024-05-28 ASSESSMENT — REFRACTION_WEARINGRX
OD_CYLINDER: +1.25
OD_SPHERE: -3.75
OS_ADD: +1.75
OS_SPHERE: -4.50
OS_CYLINDER: +1.50
OS_AXIS: 097
SPECS_TYPE: PAL
OD_AXIS: 038
OD_ADD: +1.75

## 2024-05-28 ASSESSMENT — TONOMETRY
OS_IOP_MMHG: 8
IOP_METHOD: ICARE
OD_IOP_MMHG: 7

## 2024-05-28 ASSESSMENT — VISUAL ACUITY
OS_CC: 20/15
OS_CC+: -1
OD_CC: 20/20
METHOD: SNELLEN - LINEAR

## 2024-05-28 ASSESSMENT — REFRACTION_MANIFEST
OD_AXIS: 035
OS_SPHERE: -4.00
OS_AXIS: 108
OD_ADD: +2.00
OD_CYLINDER: +1.75
OS_CYLINDER: +1.50
OD_SPHERE: -3.75
OS_ADD: +2.00

## 2024-05-28 NOTE — NURSING NOTE
"Chief Complaints and History of Present Illnesses   Patient presents with    Eye Exam For Diabetes       Chief Complaint(s) and History of Present Illness(es)       Eye Exam For Diabetes              Laterality: both eyes              Comments    Patient is here today for his annual diabetic eye exam. He has been noticing it's been harder to focus and \"more blurry\", both eyes since starting Topamax for his migraines. He started the medication mid February and due to side effects he is weaning off of it. Patient has not noticed any flashes of lights or floaters other than when he has migraines, he may see flashing lights. He's been having migraines almost everyday, had Botox about 6 weeks ago which seemed to help. He did have a really bad migraine a couple days ago, it had started about 3pm and upon waking the next morning the migraine was gone. He has not had another one since. Patient uses Systane Tears twice a day in both eyes.   Patient mentions he is having vocal cord surgery in two days at [facility].     Lab Results       Component                Value               Date                       A1C                      5.7                 02/15/2023                 A1C                      6.4                 09/23/2022                 A1C                      6.3                 02/10/2022                 A1C                      6.0                 08/19/2021                 A1C                      6.5                 02/10/2021                 A1C                      6.0                 10/16/2020                 A1C                      6.1                 08/18/2020                 A1C                      6.0                 01/24/2020                 A1C                      5.9                 09/13/2019                                 Lashell Cuevas [employer]    "

## 2024-05-30 NOTE — PROGRESS NOTES
Medication from Katie Nordisk arrived today via [employer].    Writer left VM that medications arrived and ready for  4 boxes were delivered.  Writer labeled and placed inside secured medication fridge in [facility] medication room.     Radha Estevez CMA  Adult Endocrinology   [facility]

## 2024-05-30 NOTE — PROGRESS NOTES
"HPI       Eye Exam For Diabetes    In both eyes.             Comments    Patient is here today for his annual diabetic eye exam. He has been noticing it's been harder to focus and \"more blurry\", both eyes since starting Topamax for his migraines. He started the medication mid February and due to side effects he is weaning off of it. Patient has not noticed any flashes of lights or floaters other than when he has migraines, he may see flashing lights. He's been having migraines almost everyday, had Botox about 6 weeks ago which seemed to help. He did have a really bad migraine a couple days ago, it had started about 3pm and upon waking the next morning the migraine was gone. He has not had another one since. Patient uses Systane Tears twice a day in both eyes.   Patient mentions he is having vocal cord surgery in two days at [facility].     Lab Results       Component                Value               Date                       A1C                      5.7                 02/15/2023                 A1C                      6.4                 09/23/2022                 A1C                      6.3                 02/10/2022                 A1C                      6.0                 08/19/2021                 A1C                      6.5                 02/10/2021                 A1C                      6.0                 10/16/2020                 A1C                      6.1                 08/18/2020                 A1C                      6.0                 01/24/2020                 A1C                      5.9                 09/13/2019                        Last edited by Lashell Cuevas on 5/28/2024  3:34 PM.         Review of systems for the eyes was negative other than the pertinent positives/negatives listed in the HPI.      Assessment & Plan    HPI:  Joel Pineda is a 48 year old male with history of T2DM, ankylosing spondylitis, hypothyroidism, HLD, POTS, myopia with astigmatism and " "presbyopia, allergic conjunctivitis, presents for annual diabetic eye exam. BGs have continued to be well maintained on Ozempic. Migraines are better controlled with botox injections and Ubrelvy.     He has noted vision changes and brain fog with topamax. Tapering off now.       POHx:  myopia with astigmatism and presbyopia, allergic conjunctivitis,   PMHx: T2DM, ankylosing spondylitis, hypothyroidism, HLD, POTSCurrent Medications:   Current Outpatient Medications   Medication Sig Dispense Refill    acetaminophen 500 MG CAPS Take 500 mg by mouth 3 times daily      albuterol (PROAIR HFA/PROVENTIL HFA/VENTOLIN HFA) 108 (90 Base) MCG/ACT inhaler INHALE 2 PUFFS BY MOUTH EVERY 4 HOURS AS NEEDED FOR SHORTNESS OF BREATH, DYSPNEA, OR WHEEZING 25.5 g 3    aspirin (ASA) 81 MG tablet Take 81 mg by mouth daily       B-D LUER-LUCILLE SYRINGE 25G X 1\" 3 ML MISC USE WITH B12 INJECTIONS 12 each 0    bisacodyl (DULCOLAX) 5 MG EC tablet Take 10 mg by mouth as needed for constipation      buPROPion (WELLBUTRIN XL) 300 MG 24 hr tablet Take 1 tablet by mouth daily      cholecalciferol (D3-50) 1250 mcg (35005 units) capsule TAKE ONE CAPSULE BY MOUTH EVERY 2 WEEKS 24 capsule 0    clonazePAM (KLONOPIN) 0.5 MG tablet Take 1 mg by mouth At Bedtime For RBD      cyanocobalamin (CYANOCOBALAMIN) 1000 MCG/ML injection INJECT 1 ML INTO THE MUSCLE EVERY 30 DAYS 10 mL 0    diphenhydrAMINE (BENADRYL) 25 MG capsule Take 25 mg by mouth once a week Before Enbrel injection      docusate sodium (COLACE) 50 MG capsule Take 100 mg by mouth 2 times daily      droNABinol (MARINOL) 5 MG capsule Take 5 mg by mouth      DULoxetine (CYMBALTA) 60 MG capsule Take 120 mg by mouth daily       EPINEPHrine (ADRENACLICK JR) 0.15 MG/0.15ML injection 2-pack Inject 0.15 mg into the muscle as needed for anaphylaxis May repeat one time in 5-15 minutes if response to initial dose is inadequate. This medication stays on the medication list as an \"as needed in case of emergency\" " "medication.      eszopiclone (LUNESTA) 3 MG tablet Take 3 mg by mouth At Bedtime       etanercept (ENBREL SURECLICK) 50 MG/ML autoinjector Inject 50 mg Subcutaneous once a week . Hold for signs of infection, and seek medical attention. 4 mL 7    famotidine (PEPCID) 20 MG tablet Take 20 mg by mouth daily      fenofibrate (TRICOR) 48 MG tablet TAKE ONE TABLET BY MOUTH ONCE DAILY 90 tablet 0    fexofenadine (ALLEGRA) 180 MG tablet Take 180 mg by mouth daily      fluticasone (FLONASE) 50 MCG/ACT nasal spray Spray 2 sprays in nostril daily      fluticasone-salmeterol (WIXELA INHUB) 100-50 MCG/ACT inhaler Inhale 1 puff into the lungs every 12 hours 3 each 3    guaiFENesin (MUCINEX) 600 MG 12 hr tablet Take 1 tablet (600 mg) by mouth daily      levothyroxine (SYNTHROID/LEVOTHROID) 75 MCG tablet TAKE ONE TABLET BY MOUTH EVERY MORNING 90 tablet 2    lidocaine (XYLOCAINE) 5 % external ointment Apply topically 2 times daily as needed for moderate pain 50 g 3    melatonin 3 MG tablet Take 13 mg by mouth nightly as needed       methocarbamol (ROBAXIN) 500 MG tablet TAKE 1 - 2 TABLETS BY MOUTH 4 TIMES DAILY AS NEEDED FOR MUSCLE SPASMS 240 tablet 1    metoprolol succinate ER (TOPROL XL) 100 MG 24 hr tablet TAKE 1 TABLET BY MOUTH IN THE EVENING; TO BE USED WITH 200MG IN MORNING 90 tablet 0    metoprolol succinate ER (TOPROL XL) 200 MG 24 hr tablet Take 1 tablet (200 mg) by mouth 2 times daily 180 tablet 1    montelukast (SINGULAIR) 10 MG tablet TAKE ONE TABLET BY MOUTH EVERY EVENING 90 tablet 0    nabumetone (RELAFEN) 500 MG tablet Take 1-2 tablets (500-1,000 mg) by mouth 2 times daily as needed for moderate pain 120 tablet 1    naloxone (NARCAN) 4 MG/0.1ML nasal spray Spray 4 mg into one nostril alternating nostrils as needed for opioid reversal every 2-3 minutes until assistance arrives. This medication is an antidote and stays on the chart as an \"as needed\" medication      olopatadine (PATADAY) 0.2 % ophthalmic solution Place 1 " drop into both eyes daily 2.5 mL 3    omega-3 acid ethyl esters (LOVAZA) 1 g capsule TAKE TWO CAPSULES BY MOUTH TWICE DAILY 360 capsule 2    omeprazole (PRILOSEC) 20 MG DR capsule Take 20 mg by mouth as needed      OnabotulinumtoxinA (BOTOX IJ)       ondansetron (ZOFRAN ODT) 8 MG ODT tab Take 1 tablet (8 mg) by mouth every 8 hours as needed for nausea 20 tablet 0    oxyCODONE (ROXICODONE) 5 MG tablet Take 1 tablet (5 mg) by mouth 2 times daily as needed for breakthrough pain Ok to fill 05/24/24 and begin using on 05/26/24. #30 days for chronic pain 60 tablet 0    pregabalin (LYRICA) 150 MG capsule Take 1 capsule (150 mg) by mouth 3 times daily 270 capsule 1    pyridostigmine (MESTINON) 60 MG tablet Take 1 tablet by mouth 3 times daily      ramipril (ALTACE) 10 MG capsule TAKE ONE CAPSULE BY MOUTH ONCE DAILY 90 capsule 0    riboflavin 100 MG CAPS Take 200 mg by mouth daily Two hundred milligrams nightly to prevent migraine      risperiDONE (RISPERDAL) 0.5 MG tablet Take 0.5 mg by mouth 2 times daily as needed      rosuvastatin (CRESTOR) 40 MG tablet TAKE ONE TABLET BY MOUTH ONCE DAILY 90 tablet 2    Semaglutide, 1 MG/DOSE, (OZEMPIC) 4 MG/3ML pen Inject 1 mg Subcutaneous once a week 3 mL 11    sodium chloride 0.9 % neb solution Take 3 mLs by nebulization every 3 hours as needed      sodium fluoride 1.1 % CREA At Bedtime      UBRELVY 100 MG tablet Take 100 mg by mouth at onset of headache      valACYclovir (VALTREX) 500 MG tablet Take 1 tablet (500 mg) by mouth daily 90 tablet 3    vitamin B complex with vitamin C (STRESS TAB) tablet Take 1 tablet by mouth daily      ZINC SULFATE-VITAMIN C MT Take 1 tablet by mouth daily      topiramate (TOPAMAX) 25 MG tablet Take 1 tablet (25 mg) by mouth 2 times daily 30 tablet 0     Current Facility-Administered Medications   Medication Dose Route Frequency Provider Last Rate Last Admin    2 mL lidocaine injection 2%  1 mL   Peng Perez DPM   1 mL at 09/22/23 3139     triamcinolone acetonide (KENALOG-10) injection 10 mg  10 mg   [place] Peng MARR, DPM   10 mg at 09/22/23 1256     FHx: denies family history of ocular conditions   PSHx: denies history of ocular surgeries       Current Eye Medications:  AT as needed  Pataday PRN    Assessment & Plan:  (E11.9) Type 2 diabetes mellitus without retinopathy (H)  (primary encounter diagnosis)  Diagnosed 2019  Most recent HgBA1c 5.3 on 12/11/23  No background diabetic retinopathy or neovascularization noted on today's exam.  Discussed ocular and systemic benefits of blood pressure and blood sugar control.  Return in 1 year for full exam with dilation or sooner if changes to vision.       (H52.13,  H52.203,  H52.4) Myopia of both eyes with astigmatism and presbyopia  Patient has minimal change in myopia but a copy of today's glasses prescription was given.  The patient may wish to update the glasses if the lenses are scratched or the frames are too small.  Presbyopia is difficulty seeing up close and is treated with bifocals or over the counter reading glasses    (M45.8) Ankylosing spondylitis of sacral region (H)  No evidence of uveitis on exam today  Currently on enbrel    (H04.129) Dry eye  (H10.13) Allergic conjunctivitis of both eyes  Continue AT and pataday as needed    (G43.709) Chronic migraine w/o aura w/o status migrainosus, not intractable  No evidence of myopic shift or angle closure from topamax. Tapering     Return in about 1 year (around 5/28/2025) for Annual Visit-v/t/d/MRx.        Rian Diez MD     Attending Physician Attestation:  Complete documentation of historical and exam elements from today's encounter can be found in the full encounter summary report (not reduplicated in this progress note).  I personally obtained the chief complaint(s) and history of present illness.  I confirmed and edited as necessary the review of systems, past medical/surgical history, family history, social history, and examination  findings as documented by others; and I examined the patient myself.  I personally reviewed the relevant tests, images, and reports as documented above.  I formulated and edited as necessary the assessment and plan and discussed the findings and management plan with the patient and family. - Rian Diez MD

## 2024-06-03 NOTE — PROGRESS NOTES
Patient stopped by the clinic, first and last name verified with date of birth. Ozempic was given to the patient.    Ksenia Hanson, [facility]  Adult Endocrinology  [facility] [place]

## 2024-06-08 DIAGNOSIS — I10 ESSENTIAL HYPERTENSION WITH GOAL BLOOD PRESSURE LESS THAN 140/90: ICD-10-CM

## 2024-06-09 ENCOUNTER — NURSE TRIAGE (OUTPATIENT)
Dept: NURSING | Facility: CLINIC | Age: 49
End: 2024-06-09
Payer: MEDICARE

## 2024-06-09 NOTE — TELEPHONE ENCOUNTER
"Pt calling with concerns for HA. Pt has a hx of migraines. He has been having one off and on for the last two days. Pain is rated 4-5/10 and is located in his right eye. Denies fever, neck stiffness, and visual changes. Dispo to go be seen within 24 hours. Pt is going to go to [facility].     Reason for Disposition   [1] MODERATE headache (e.g., interferes with normal activities) AND [2] present > 24 hours AND [3] unexplained  (Exceptions: analgesics not tried, typical migraine, or headache part of viral illness)   [1] New headache AND [2] age > 50    Additional Information   Negative: Difficult to awaken or acting confused (e.g., disoriented, slurred speech)   Negative: Sounds like a life-threatening emergency to the triager   Negative: [1] Weakness of the face, arm or leg on one side of the body AND [2] new-onset   Negative: [1] Numbness of the face, arm or leg on one side of the body AND [2] new-onset   Negative: [1] Loss of speech or garbled speech AND [2] new-onset   Negative: Passed out (i.e., lost consciousness, collapsed and was not responding)   Negative: Severe pain in one eye   Negative: Stiff neck (can't touch chin to chest)   Negative: [1] Other family members (or people in same household) with headaches AND [2] possibility of carbon monoxide exposure   Negative: Loss of vision or double vision  (Exception: Same as prior migraines.)   Negative: Unable to walk, or can only walk with assistance (e.g., requires support)   Negative: Patient sounds very sick or weak to the triager   Negative: [1] Fever > 100.0 F (37.8 C) AND [2] diabetes mellitus or weak immune system (e.g., HIV positive, cancer chemo, splenectomy, organ transplant, chronic steroids)   Negative: [1] SEVERE headache (e.g., excruciating) AND [2] \"worst headache\" of life   Negative: [1] SEVERE headache AND [2] sudden-onset (i.e., reaching maximum intensity within seconds to 1 hour)   Negative: [1] SEVERE headache AND [2] fever   Negative: [1] " SEVERE headache (e.g., excruciating) AND [2] not improved after 2 hours of pain medicine   Negative: [1] Vomiting AND [2] 2 or more times  (Exception: Similar to previous migraines.)   Negative: Fever > 104 F (40 C)    Protocols used: Headache-A-AH    Consuelo Salvador RN  [facility] Nurse Advisor   6/9/2024  4:29 PM

## 2024-06-10 ENCOUNTER — OFFICE VISIT (OUTPATIENT)
Dept: PULMONOLOGY | Facility: CLINIC | Age: 49
End: 2024-06-10
Payer: MEDICARE

## 2024-06-10 ENCOUNTER — TELEPHONE (OUTPATIENT)
Dept: PHARMACY | Facility: OTHER | Age: 49
End: 2024-06-10

## 2024-06-10 VITALS
BODY MASS INDEX: 34.45 KG/M2 | DIASTOLIC BLOOD PRESSURE: 81 MMHG | HEART RATE: 82 BPM | SYSTOLIC BLOOD PRESSURE: 130 MMHG | WEIGHT: 283 LBS | OXYGEN SATURATION: 98 %

## 2024-06-10 DIAGNOSIS — J45.30 MILD PERSISTENT ASTHMA, UNSPECIFIED WHETHER COMPLICATED: ICD-10-CM

## 2024-06-10 PROCEDURE — 99215 OFFICE O/P EST HI 40 MIN: CPT | Performed by: INTERNAL MEDICINE

## 2024-06-10 PROCEDURE — G2211 COMPLEX E/M VISIT ADD ON: HCPCS | Performed by: INTERNAL MEDICINE

## 2024-06-10 RX ORDER — RAMIPRIL 10 MG/1
CAPSULE ORAL
Qty: 90 CAPSULE | Refills: 0 | Status: SHIPPED | OUTPATIENT
Start: 2024-06-10 | End: 2024-09-02

## 2024-06-10 RX ORDER — ALBUTEROL SULFATE 90 UG/1
2 AEROSOL, METERED RESPIRATORY (INHALATION) EVERY 6 HOURS PRN
Qty: 25.5 G | Refills: 3 | Status: SHIPPED | OUTPATIENT
Start: 2024-06-10

## 2024-06-10 RX ORDER — METOPROLOL SUCCINATE 200 MG/1
200 TABLET, EXTENDED RELEASE ORAL 2 TIMES DAILY
Qty: 180 TABLET | Refills: 0 | Status: ON HOLD | OUTPATIENT
Start: 2024-06-10 | End: 2024-09-25

## 2024-06-10 ASSESSMENT — ASTHMA QUESTIONNAIRES
QUESTION_1 LAST FOUR WEEKS HOW MUCH OF THE TIME DID YOUR ASTHMA KEEP YOU FROM GETTING AS MUCH DONE AT WORK, SCHOOL OR AT HOME: NONE OF THE TIME
ACT_TOTALSCORE: 21
ACT_TOTALSCORE: 21
QUESTION_4 LAST FOUR WEEKS HOW OFTEN HAVE YOU USED YOUR RESCUE INHALER OR NEBULIZER MEDICATION (SUCH AS ALBUTEROL): TWO OR THREE TIMES PER WEEK
QUESTION_3 LAST FOUR WEEKS HOW OFTEN DID YOUR ASTHMA SYMPTOMS (WHEEZING, COUGHING, SHORTNESS OF BREATH, CHEST TIGHTNESS OR PAIN) WAKE YOU UP AT NIGHT OR EARLIER THAN USUAL IN THE MORNING: NOT AT ALL
ACUTE_EXACERBATION_TODAY: NO
QUESTION_5 LAST FOUR WEEKS HOW WOULD YOU RATE YOUR ASTHMA CONTROL: WELL CONTROLLED
QUESTION_2 LAST FOUR WEEKS HOW OFTEN HAVE YOU HAD SHORTNESS OF BREATH: ONCE OR TWICE A WEEK

## 2024-06-10 ASSESSMENT — PAIN SCALES - GENERAL: PAINLEVEL: MODERATE PAIN (4)

## 2024-06-10 NOTE — PROGRESS NOTES
Joel Pineda's goals for this visit include: Return  He requests these members of his care team be copied on today's visit information: PCP    PCP: Ksenia Lyles    Referring Provider:  Referred Self, MD  No address on file    /81   Pulse 82   Wt 128.4 kg (283 lb)   SpO2 98%   BMI 34.45 kg/m      Do you need any medication refills at today's visit? TINA Szymanski LPN  Pulmonary Medicine:  [facility]  Phone: [phone] Fax: [phone]

## 2024-06-10 NOTE — PROGRESS NOTES
Pulmonary Clinic Return Patient Visit  Reason for Visit: Asthma  History of Present Illness  Joel Pineda is a pleasant 48-year-old male with a history of ankylosing spondylitis on Humira, acid reflux, allergies, and asthma who presents to pulmonary clinic today for further follow-up of asthma. I last saw him in clinic in 6/2023.   To briefly review, history of childhood asthma had been decently well controlled on high dose Advair, Singulair, and albuterol as needed and pulmonary function has historically been normal.  Previous attempts to de-escalate asthma therapy yielded nearly immediate return to symptoms and he was left on the same regimen for the last several years.  Hx of pulmonary embolism in early 40s - not on any blood thinners now. Not sure if it is provoked and was treated with warfarin for 6 months. When I saw him in clinic, asthma control was excellent with an ACT score of 21 and I de-escalated his regimen to intermediate dose Fluticasone/salmaterol 250/50 mcg.  He has been dealing with a lot of voice hoarseness which was initially thought to be due to his inhalers but even after cessation, symptoms persistent. He has been followed by ENT and is currently getting steroid injections which has been helpful. He is considering thyroplasty.   He has lost considerable weight since starting Ozempic after referral to the weight management clinic.   No flares recently on steroid burst treatments.   He also has GERD which is fairly controlled on Prilosec 20 mg daily. He also has LLOYD and uses his CPAP regularly.   Never smoker. 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    Review of Systems:  10 of 14 systems reviewed and are negative unless otherwise stated in HPI.    Past Medical History:   Diagnosis Date    Acne     Acquired hypothyroidism     Allergic state     Ankylosing spondylitis lumbar region (H)     Ankylosing  spondylitis of sacral region (H)     Anxiety     Bipolar 2 disorder (H)     Chest pain     Chest pain, regulated w/BP meds. Clear arteries.    Chronic pain     DDD (degenerative disc disease), lumbar     Depressive disorder     Diabetes (H)     Diverticulosis     Facet arthritis of cervical region     Gastroesophageal reflux disease     Hypertension     IBS (irritable bowel syndrome)     Intracranial arachnoid cyst     Major depressive disorder, recurrent episode (H24)     Multiple psych providers - they manage meds August 2015: Provigil induced severe mood dis-function     Major depressive disorder, recurrent episode, severe (H) 12/2/2020    MDD (major depressive disorder), recurrent severe, without psychosis (H) 7/21/2021    Mixed dyslipidemia 4/6/2023    LLOYD (obstructive sleep apnea)- mild (AHI 11)     Polyneuropathy     Pulmonary embolism (H)     Skin exam, screening for cancer 12/3/2013    Sleep apnea     Uncomplicated asthma        Past Surgical History:   Procedure Laterality Date    BACK SURGERY  10/2007    lumbar discectomy L5-S1    BIOPSY  09/15/2020    Colon Adenomas x2    COLONOSCOPY      Note: colonoscopy scheduled with [facility] on Friday, 9/4/15    COSMETIC SURGERY  2012    Nose Exterior - functional    GI SURGERY  08/2013    Sigmoidectomy    HERNIA REPAIR, UMBILICAL  08/23/2011    Dr. Evan whiting    INCISION AND DRAINAGE, ABSCESS, COMPLEX  08/23/2011    umbilical, Dr. Evan Beavers    LAPAROSCOPIC ASSISTED COLECTOMY LEFT (DESCENDING)  08/15/2013    Procedure: LAPAROSCOPIC ASSISTED COLECTOMY LEFT (DESCENDING);  Laparoscopic Hand Assisted Sigmoid Resection, Mobilization of Splenic Fissure, coloproctoscopy, *Latex Free Room* Anesthesia General with Pain block  ;  Surgeon: Aurora Justice MD;  Location: [facility] OR    NERVE SURGERY  08/18/2011    RF ablation @ L3-S1 @ [facility]    RECONSTRUCT NOSE AND SEPTUM (FUNCTIONAL)  10/14/2011    Procedure:RECONSTRUCT NOSE AND SEPTUM (FUNCTIONAL); Functional  "Septorhinoplasty, Turbinate Reduction, ; Surgeon:CEDRIC CUEVAS; Location:[facility] OR    SINUS SURGERY  10/01/2001    ethmoidectomy chronic sinusitis    SOFT TISSUE SURGERY  4/7/2022    Hidradenitis Suppurativa surgery       Family History   Problem Relation Age of Onset    Musculoskeletal Disorder Mother         back    Anxiety Disorder Mother     Colon Polyps Mother     Ulcerative Colitis Mother         and ischemic small intestine, surgery    Hypertension Mother     Breast Cancer Mother     Osteoporosis Mother     Diabetes Mother         Type 2, Diagnosed in 2014    Depression Mother         Takes Cymbalta to help with chronic pain + depx    Thyroid Disease Mother         Hypothyroidism    Obesity Mother         Under much better control latter half of 2015    Musculoskeletal Disorder Father         back    Substance Abuse Father         Alcohol    Hypertension Father     Hyperlipidemia Father     Depression Father         Off meds for many years. Seems \"ok\"    Anxiety Disorder Father     Heart Disease Maternal Grandmother     Heart Disease Maternal Grandfather     Psychotic Disorder Paternal Grandfather     Suicide Paternal Grandfather     Depression Paternal Grandfather         Pediatrician. Committed suicide by pistol in 1990.    Musculoskeletal Disorder Brother         back    Depression Brother         Expressed as anger and moodiness    Substance Abuse Brother         Alcohol    Anxiety Disorder Brother     Substance Abuse Sister         Alcohol    Depression Sister         Mental Health Therapist, yet no anti-depressants?    Anxiety Disorder Sister         Mental Health Therapist, yet no anti-anxiety meds?    Other Cancer Other         Bladder    Substance Abuse Brother     Colon Cancer No family hx of     Crohn's Disease No family hx of     Anesthesia Reaction No family hx of     Cancer No family hx of         No family history of skin cancer       Social History     Socioeconomic History    Marital status: " Single     Spouse name: Not on file    Number of children: Not on file    Years of education: Not on file    Highest education level: Not on file   Occupational History    Occupation:      Employer: [employer]    Occupation: paraprofessional     Comment: [place]     Employer: [employer]   Social Needs    Financial resource strain: Not on file    Food insecurity     Worry: Not on file     Inability: Not on file    Transportation needs     Medical: Not on file     Non-medical: Not on file   Tobacco Use    Smoking status: Never Smoker    Smokeless tobacco: Never Used   Substance and Sexual Activity    Alcohol use: Not Currently     Alcohol/week: 0.0 standard drinks     Drinks per session: 1 or 2     Binge frequency: Weekly     Comment: occ 1/week    Drug use: No    Sexual activity: Never     Partners: Female     Birth control/protection: None   Lifestyle    Physical activity     Days per week: Not on file     Minutes per session: Not on file    Stress: Not on file   Relationships    Social connections     Talks on phone: Not on file     Gets together: Not on file     Attends Confucianist service: Not on file     Active member of club or organization: Not on file     Attends meetings of clubs or organizations: Not on file     Relationship status: Not on file    Intimate partner violence     Fear of current or ex partner: Not on file     Emotionally abused: Not on file     Physically abused: Not on file     Forced sexual activity: Not on file   Other Topics Concern    Parent/sibling w/ CABG, MI or angioplasty before 65F 55M? No     Service Not Asked    Blood Transfusions Not Asked    Caffeine Concern Not Asked    Occupational Exposure Not Asked    Hobby Hazards Not Asked    Sleep Concern Yes    Stress Concern Yes    Weight Concern Not Asked    Special Diet Not Asked    Back Care Yes    Exercise Not Asked    Bike Helmet Not Asked    Seat Belt Yes    Self-Exams Not Asked   Social History Narrative     Not on file         Allergies   Allergen Reactions    Amoxicillin-Pot Clavulanate Difficulty breathing    Banana Shortness Of Breath     Pt reports organic Banana is okay.     Clavulanic Acid Anaphylaxis     Other Reaction(s): Throat swelling    Nitroglycerin Palpitations    Penicillins Anaphylaxis    Provigil [Modafinil] Shortness Of Breath     headache    Gadolinium Hives and Itching     Patient was premedicated for the contrast allergy. He did still have a reaction a few hours after injection. Hives and itching. Dr. Gomez told tech to inform pt he should only have contrast again in the future when premedicated and at a hospital. Not at an outpatient facility.     Haemophilus B Polysaccharide Vaccine Rash     Quadrivalent, cell based    Influenza Virus Vaccine Rash     Quadrivalent, cell based    Ketoconazole      Topical cream caused swelling and itching    Dye [Contrast Dye] Other (See Comments) and Hives     Moderate flushing, CT contrast  Iodinated and gadolinium    Formoterol     Gabapentin      Other reaction(s): hives    Golimumab      Hives, bradycardia, face swelling    Mometasone     Naproxen      Other reaction(s): Bleeding Gums    Neurontin [Gabapentin] Hives     Moderate hives    Nortriptyline Hives    Nystatin Hives    Varicella Virus Vaccine Live      Rash    Adhesive Tape Rash    Baclofen Other (See Comments)     Cognitive changes    Flagyl [Metronidazole Hcl] Palpitations and Hives    Latex Rash    Metronidazole Palpitations, Other (See Comments) and Rash     dizziness (versus ciprofloxacin taken at same time)    Sulfamethizole Rash     Other Reaction(s): Rash         Current Outpatient Medications:     acetaminophen 500 MG CAPS, Take 500 mg by mouth 3 times daily, Disp: , Rfl:     albuterol (PROAIR HFA/PROVENTIL HFA/VENTOLIN HFA) 108 (90 Base) MCG/ACT inhaler, Inhale 2 puffs into the lungs every 6 hours as needed for shortness of breath, wheezing or cough 90 day supply, Disp: 25.5 g,  "Rfl: 3    aspirin (ASA) 81 MG tablet, Take 81 mg by mouth daily , Disp: , Rfl:     B-D LUER-LUCILLE SYRINGE 25G X 1\" 3 ML MISC, USE WITH B12 INJECTIONS, Disp: 12 each, Rfl: 0    bisacodyl (DULCOLAX) 5 MG EC tablet, Take 10 mg by mouth as needed for constipation, Disp: , Rfl:     buPROPion (WELLBUTRIN XL) 300 MG 24 hr tablet, Take 1 tablet by mouth daily, Disp: , Rfl:     cholecalciferol (D3-50) 1250 mcg (76561 units) capsule, TAKE ONE CAPSULE BY MOUTH EVERY 2 WEEKS, Disp: 24 capsule, Rfl: 0    clonazePAM (KLONOPIN) 0.5 MG tablet, Take 1 mg by mouth At Bedtime For RBD, Disp: , Rfl:     cyanocobalamin (CYANOCOBALAMIN) 1000 MCG/ML injection, INJECT 1 ML INTO THE MUSCLE EVERY 30 DAYS, Disp: 10 mL, Rfl: 0    diphenhydrAMINE (BENADRYL) 25 MG capsule, Take 25 mg by mouth once a week Before Enbrel injection, Disp: , Rfl:     docusate sodium (COLACE) 50 MG capsule, Take 100 mg by mouth 2 times daily, Disp: , Rfl:     DULoxetine (CYMBALTA) 60 MG capsule, Take 120 mg by mouth daily , Disp: , Rfl:     EPINEPHrine (ADRENACLICK JR) 0.15 MG/0.15ML injection 2-pack, Inject 0.15 mg into the muscle as needed for anaphylaxis May repeat one time in 5-15 minutes if response to initial dose is inadequate. This medication stays on the medication list as an \"as needed in case of emergency\" medication., Disp: , Rfl:     eszopiclone (LUNESTA) 3 MG tablet, Take 3 mg by mouth At Bedtime , Disp: , Rfl:     etanercept (ENBREL SURECLICK) 50 MG/ML autoinjector, Inject 50 mg Subcutaneous once a week . Hold for signs of infection, and seek medical attention., Disp: 4 mL, Rfl: 7    famotidine (PEPCID) 20 MG tablet, Take 20 mg by mouth daily, Disp: , Rfl:     fenofibrate (TRICOR) 48 MG tablet, TAKE ONE TABLET BY MOUTH ONCE DAILY, Disp: 90 tablet, Rfl: 0    fexofenadine (ALLEGRA) 180 MG tablet, Take 180 mg by mouth daily, Disp: , Rfl:     fluticasone (FLONASE) 50 MCG/ACT nasal spray, Spray 2 sprays in nostril daily, Disp: , Rfl:     fluticasone-salmeterol " "(WIXELA INHUB) 100-50 MCG/ACT inhaler, Inhale 1 puff into the lungs every 12 hours, Disp: 3 each, Rfl: 3    guaiFENesin (MUCINEX) 600 MG 12 hr tablet, Take 1 tablet (600 mg) by mouth daily, Disp: , Rfl:     levothyroxine (SYNTHROID/LEVOTHROID) 75 MCG tablet, TAKE ONE TABLET BY MOUTH EVERY MORNING, Disp: 90 tablet, Rfl: 2    lidocaine (XYLOCAINE) 5 % external ointment, Apply topically 2 times daily as needed for moderate pain, Disp: 50 g, Rfl: 3    melatonin 3 MG tablet, Take 13 mg by mouth nightly as needed , Disp: , Rfl:     methocarbamol (ROBAXIN) 500 MG tablet, TAKE 1 - 2 TABLETS BY MOUTH 4 TIMES DAILY AS NEEDED FOR MUSCLE SPASMS, Disp: 240 tablet, Rfl: 1    metoprolol succinate ER (TOPROL XL) 100 MG 24 hr tablet, TAKE 1 TABLET BY MOUTH IN THE EVENING; TO BE USED WITH 200MG IN MORNING, Disp: 90 tablet, Rfl: 0    metoprolol succinate ER (TOPROL XL) 200 MG 24 hr tablet, TAKE 1 TABLET BY MOUTH TWICE DAILY, Disp: 180 tablet, Rfl: 0    montelukast (SINGULAIR) 10 MG tablet, TAKE ONE TABLET BY MOUTH EVERY EVENING, Disp: 90 tablet, Rfl: 0    nabumetone (RELAFEN) 500 MG tablet, Take 1-2 tablets (500-1,000 mg) by mouth 2 times daily as needed for moderate pain, Disp: 120 tablet, Rfl: 1    naloxone (NARCAN) 4 MG/0.1ML nasal spray, Spray 4 mg into one nostril alternating nostrils as needed for opioid reversal every 2-3 minutes until assistance arrives. This medication is an antidote and stays on the chart as an \"as needed\" medication, Disp: , Rfl:     olopatadine (PATADAY) 0.2 % ophthalmic solution, Place 1 drop into both eyes daily, Disp: 2.5 mL, Rfl: 3    omega-3 acid ethyl esters (LOVAZA) 1 g capsule, TAKE TWO CAPSULES BY MOUTH TWICE DAILY, Disp: 360 capsule, Rfl: 2    omeprazole (PRILOSEC) 20 MG DR capsule, Take 20 mg by mouth as needed, Disp: , Rfl:     OnabotulinumtoxinA (BOTOX IJ), , Disp: , Rfl:     ondansetron (ZOFRAN ODT) 8 MG ODT tab, Take 1 tablet (8 mg) by mouth every 8 hours as needed for nausea, Disp: 20 " tablet, Rfl: 0    oxyCODONE (ROXICODONE) 5 MG tablet, Take 1 tablet (5 mg) by mouth 2 times daily as needed for breakthrough pain Ok to fill 05/24/24 and begin using on 05/26/24. #30 days for chronic pain, Disp: 60 tablet, Rfl: 0    pregabalin (LYRICA) 150 MG capsule, Take 1 capsule (150 mg) by mouth 3 times daily, Disp: 270 capsule, Rfl: 1    pyridostigmine (MESTINON) 60 MG tablet, Take 1 tablet by mouth 3 times daily, Disp: , Rfl:     ramipril (ALTACE) 10 MG capsule, TAKE ONE CAPSULE BY MOUTH ONCE DAILY, Disp: 90 capsule, Rfl: 0    riboflavin 100 MG CAPS, Take 200 mg by mouth daily Two hundred milligrams nightly to prevent migraine, Disp: , Rfl:     risperiDONE (RISPERDAL) 0.5 MG tablet, Take 0.5 mg by mouth 2 times daily as needed, Disp: , Rfl:     rosuvastatin (CRESTOR) 40 MG tablet, TAKE ONE TABLET BY MOUTH ONCE DAILY, Disp: 90 tablet, Rfl: 2    Semaglutide, 1 MG/DOSE, (OZEMPIC) 4 MG/3ML pen, Inject 1 mg Subcutaneous once a week, Disp: 3 mL, Rfl: 11    sodium chloride 0.9 % neb solution, Take 3 mLs by nebulization every 3 hours as needed, Disp: , Rfl:     sodium fluoride 1.1 % CREA, At Bedtime, Disp: , Rfl:     UBRELVY 100 MG tablet, Take 100 mg by mouth at onset of headache, Disp: , Rfl:     valACYclovir (VALTREX) 500 MG tablet, Take 1 tablet (500 mg) by mouth daily, Disp: 90 tablet, Rfl: 3    vitamin B complex with vitamin C (STRESS TAB) tablet, Take 1 tablet by mouth daily, Disp: , Rfl:     ZINC SULFATE-VITAMIN C MT, Take 1 tablet by mouth daily, Disp: , Rfl:     topiramate (TOPAMAX) 25 MG tablet, Take 1 tablet (25 mg) by mouth 2 times daily, Disp: 30 tablet, Rfl: 0    Current Facility-Administered Medications:     2 mL lidocaine injection 2%, 1 mL, , , Peng Perez DPM, 1 mL at 09/22/23 1256    triamcinolone acetonide (KENALOG-10) injection 10 mg, 10 mg, , , Peng Perez, DPM, 10 mg at 09/22/23 1256      Physical Exam:  /81   Pulse 82   Wt 128.4 kg (283 lb)   SpO2 98%   BMI 34.45 kg/m   "  GENERAL: Well developed, well nourished, alert, and in no apparent distress.  HEENT: Normocephalic, atraumatic. PERRL, EOMI. Oral mucosa is moist. No perioral cyanosis.  NECK: supple, no masses, no thyromegaly.  RESP:  Normal respiratory effort.  CTAB.  No rales, wheezes, rhonchi.  No cyanosis or clubbing.  CV: Normal S1, S2, regular rhythm, normal rate. No murmur.  No LE edema.   ABDOMEN:  Soft, non-tender, non-distended.   SKIN: warm and dry. No rash.  NEURO: AAOx3.  Normal gait.  Fluent speech.  PSYCH: mentation appears normal.     Results:  PFTs: Normal lung volumes, flows, volume loops and preserved diffusion.  Most Recent [facility] Pulmonary Function Testing    FVC-Pred   Date Value Ref Range Status   06/02/2021 6.19 L      FVC-Pre   Date Value Ref Range Status   06/02/2021 5.29 L      FVC-%Pred-Pre   Date Value Ref Range Status   06/02/2021 85 %      FEV1-Pre   Date Value Ref Range Status   06/02/2021 4.38 L      FEV1-%Pred-Pre   Date Value Ref Range Status   06/02/2021 90 %      FEV1FVC-Pred   Date Value Ref Range Status   06/02/2021 79 %      FEV1FVC-Pre   Date Value Ref Range Status   06/02/2021 83 %      No results found for: \"20029\"  FEFMax-Pred   Date Value Ref Range Status   06/02/2021 11.37 L/sec      FEFMax-Pre   Date Value Ref Range Status   06/02/2021 11.97 L/sec      FEFMax-%Pred-Pre   Date Value Ref Range Status   06/02/2021 105 %      ExpTime-Pre   Date Value Ref Range Status   06/02/2021 6.90 sec      FIFMax-Pre   Date Value Ref Range Status   06/02/2021 9.58 L/sec      FEV1FEV6-Pred   Date Value Ref Range Status   06/02/2021 81 %      FEV1FEV6-Pre   Date Value Ref Range Status   06/02/2021 83 %      No results found for: \"20055\"  Imaging (personally reviewed in clinic today): None      Assessment and Plan:  Moderate Persistent Asthma  ACT score is today is 21 while on Wixela and his symptoms are well controlled. We discussed trigger avoidance and an asthma action plan was discussed in clinic " today. He has not done well with attempts to de-escalate his regimen and so I will not make any changes.  Tito was educated on the fact that obesity, GERD and LLOYD can make asthma control more difficult and he knows the importance of weight loss, GERD control (now on Prilosec daily) and compliance with his CPAP for LLOYD control. He may benefit from increasing the dose of his Prilosec, he knows to increase to 40 mg if he needs to.  My Asthma Home Management Plan of Care  Name: Joel Pineda   YOB: 1975  Date: 6/10/2024   My doctor: Ksenia Lyles   My clinic: [address] [address] [phone]   Control Medicine (dose,route freq.): Advair 250-50 one puff twice daily (Wixela)  Reliever/Rescue Medicine: Albuterol (Proair/Ventolin/Proventil HFA) 2-4 puffs EVERY 4 HOURS as needed. Use a spacer if recommended by your provider.  Oral Steroid Medicine: None My Asthma Severity: Mild Persistent  Avoid your asthma triggers: emotions and cold air        GREEN   ZONE   Good Control  I feel good  No cough or wheeze  Can work, sleep and play without asthma symptoms       Take your asthma control medicine every day.    If exercise triggers your asthma, take Albuterol (Proair/Ventolin/Proventil HFA) 2-4 puffs EVERY 4 HOURS as needed. Use a spacer if recommended by your provider.  15 minutes before exercise or sports, and  During exercise if you have asthma symptoms  Spacer to use with inhaler: No             YELLOW   ZONE Getting Worse  I have ANY of these:  I do not feel good  Cough or wheeze  Chest feels tight  Wake up at night       Keep taking your Green Zone medications  Start taking your rescue medicine:  every 20 minutes for up to 1 hour. Then every 4 hours for 24-48 hours.  If you do not return to the Green Zone in 12-24 hours or you get worse, start taking your oral steroid medicine as prescribed by your provider.  If you stay in the  Yellow Zone for more than 12-24 hours, contact your doctor.           RED ZONE Medical Alert - Get Help  I have ANY of these:  I feel awful  Medicine is not helping  Breathing getting harder  Trouble walking or talking  Nose opens wide to breathe       Take your rescue medicine NOW  If your provider has prescribed an oral steroid medicine, start taking it NOW  Call your doctor NOW  If you are still in the Red Zone after 20 minutes and you have not reached your doctor:  Take your rescue medicine again and  Call 911 or go to the emergency room right away    See your regular doctor within 7-10 days of an Emergency Room or Urgent Care visit for follow-up treatment.        Electronically signed by: Sue Gunter MD, Lupe 10, 2024  Annual Reminders:  Flu Shot in the Fall  Pharmacy: [id number] [id number] [address]      Asthma Triggers  How To Control Things That Make Your Asthma Worse    Triggers are things that make your asthma worse.  Look at the list below to help you find your triggers and  what you can do about them.  You can help prevent asthma flare-ups by staying away from your triggers.      Trigger                                                          What you can do   Cigarette Smoke  Tobacco smoke can make asthma worse. Do not allow smoking in your home, car or around you.  Be sure no one smokes at a child s day care or school.  If you smoke, ask your health care provider for ways to help you quick.  Ask family members to quit too.  Ask your health care provider for a referral to Quit plan to help you quit smoking, or call 3-371-070-PLAN.     Colds, Flu, Bronchitis  These are common triggers of asthma. Wash your hands often.  Don t touch your eyes, nose or mouth.  Get a flu shot every year.     Dust Mites  These are tiny bugs that live in cloth or carpet. They are too small to see. Wash sheets and blankets in hot water every week.   Encase pillows and mattress in dust mite  proof covers.  Avoid having carpet if you can. If you have carpet, vacuum weekly.   Use a dust mask and HEPA vacuum.   Pollen and Outdoor Mold  Some people are allergic to trees, grass, or weed pollen, or molds. Try to keep your windows closed.  Limit time out doors when pollen count is high.   Ask you health care provider about taking medicine during allergy season.     Animal Dander  Some people are allergic to skin flakes, urine or saliva from pets with fur or feathers. Keep pets with fur or feathers out of your home.    If you can t keep the pet outdoors, then keep the pet out of your bedroom.  Keep the bedroom door closed.  Keep pets off cloth furniture and away from stuffed toys.     Mice, Rats, and Cockroaches  Some people are allergic to the waste from these pets.   Cover food and garbage.  Clean up spills and food crumbs.  Store grease in the refrigerator.   Keep food out of the bedroom.   Indoor Mold  This can be a trigger if your home has high moisture Fix leaking faucets, pipes, or other sources of water.   Clean moldy surfaces.  Dehumidify basement if it is damp and smelly.   Smoke, Strong Odors, and Sprays  These can reduce air quality. Stay away from strong odors and sprays, such as perfume, powder, hair spray, paints, smoke incense, paints, cleaning products, candles and new carpet.   Exercise or Sports  Some people with asthma have this trigger. Be active!  Ask you doctor about taking medicine before sports or exercise to prevent symptoms.    Warm up for 5-10 minutes before and after sports or exercise.     Other Triggers of Asthma  Cold air:  Cover your nose and mouth with a scarf.  Sometimes laughing or crying can be a trigger.  Some medicines and food can trigger asthma.        Voice hoarseness  Following with ENT     Obesity/LLOYD  He has lost considerable weight since starting Ozempic after referral to the weight management clinic.     Ankylosing spondylosis on Enbrel  No evidence of lung  restriction of PFTs      Questions and concerns were answered to the patient's satisfaction.  he was provided with my contact information should new questions or concerns arise in the interim.  He should return to clinic in 12 months  He is up to date on a seasonal influenza vaccine, Prevnar (2015) and Pneumovax (2016).    I spent 40 minutes on the date of the encounter doing chart review, history and exam, documentation and further coordination as noted above exclusive of time interpreting PFT, Chest Xray, CT Chest.      Sue Gunter MD  Pulmonary, Critical Care and Sleep Medicine  [facility]  Pager: [phone]    The above note was dictated using voice recognition software and may include typographical errors. Please contact the author for any clarifications.

## 2024-06-10 NOTE — TELEPHONE ENCOUNTER
Patient called asking if Ubrelvy could potentially be contributing to side effect such as impulsiveness/spending money.  Also noting shakiness more recently.  Discussed that these aren't typical side effects of Ubrelvy.   In our discussion, learned that risperidone was started for agitation.  Of note, is on Wellbutrin, which may contribute to agitation/anxiety; may also contribute to tremor.  May be of benefit to discuss with provider if appropriate to reduce Wellbutrin dose.    Due to polypharmacy, would benefit from follow-up MTM given most recent concerns.  Scheduled for tomorrow, June 11th at 2pm.    Sneha Nelson, Pharm.D.,[facility]  Board Certified Geriatric Pharmacist  Medication Therapy Management Pharmacist  [phone]

## 2024-06-11 ENCOUNTER — PATIENT OUTREACH (OUTPATIENT)
Dept: CARE COORDINATION | Facility: CLINIC | Age: 49
End: 2024-06-11
Payer: MEDICARE

## 2024-06-11 ENCOUNTER — VIRTUAL VISIT (OUTPATIENT)
Dept: PHARMACY | Facility: CLINIC | Age: 49
End: 2024-06-11
Payer: COMMERCIAL

## 2024-06-11 DIAGNOSIS — G43.819 OTHER MIGRAINE WITHOUT STATUS MIGRAINOSUS, INTRACTABLE: ICD-10-CM

## 2024-06-11 DIAGNOSIS — F41.8 DEPRESSION WITH ANXIETY: Primary | ICD-10-CM

## 2024-06-11 DIAGNOSIS — G47.00 INSOMNIA, UNSPECIFIED TYPE: ICD-10-CM

## 2024-06-11 PROCEDURE — 99207 PR NO CHARGE LOS: CPT | Mod: 93 | Performed by: PHARMACIST

## 2024-06-11 NOTE — PATIENT INSTRUCTIONS
"Recommendations from today's MTM visit:                                                         Follow up with pyschiatry and neurology about recent concerns.     Follow-up: 2 months    It was great speaking with you today.  I value your experience and would be very thankful for your time in providing feedback in our clinic survey. In the next few days, you may receive an email or text message from [facility] [employer] with a link to a survey related to your  clinical pharmacist.\"     To schedule another MTM appointment, please call the clinic directly or you may call the MTM scheduling line at [phone] or toll-free at [phone].     My Clinical Pharmacist's contact information:                                                      Please feel free to contact me with any questions or concerns you have.      Radha Mcdonald, Pharm.D, [facility]  Medication Therapy Management Pharmacist      "

## 2024-06-11 NOTE — PROGRESS NOTES
Medication Therapy Management (MTM) Encounter    ASSESSMENT:                            Medication Adherence/Access: No issues reported    Mood/Insomnia: Stable. Follows closely with psychiatry.    Migraine:  Obsessive behaviors not mentioned as side effect of Ubrelvy. Could possibly be related to tapering of topamax? Will reach out to neurology colleagues to consult with them. Also recommend patient follow up with psychiatry and neurology about recent symptoms.      PLAN:                            No medication changes recommended today.    Follow-up: 2 months    SUBJECTIVE/OBJECTIVE:                          Joel Pineda is a 48 year old male called for a follow up visit. He was referred to me from Ksenia Lyles. Follow up from 5/7/2024.    Reason for visit: Medication Review.    Allergies/ADRs: Reviewed in chart  Past Medical History: Reviewed in chart  Tobacco: He reports that he has never smoked. He has never used smokeless tobacco.  Alcohol: none  Caffeine: 4-5 cups of coffee/day  Activity: None    Medication Adherence/Access: no issues reported  Patient uses pill box(es).  Patient qualifies for LIN TV program for Enbrel (delivers to his house) and Ozempic (ships to [facility] endocr[facility]ology clinic)      Mood/Insomnia:  Duloxetine 120mg daily  Bupropion XL 300mg daily  Risperidone 0.5mg twice daily as needed-taking four times a week  Clonazepam 1mg at bedtime for RBD  Lunesta 3mg at bedtime  Melatonin 13mg nightly as needed    Feels mood is moving in the right direction. Follows  with Dr. CHENEY at [facility]. Therapy through pain management.  Sleep has been ok. 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     Migraine:   Preventive medications  Botox    Acute medications  Ubrelvy    Buying stuff off of ebay. Flushed in his face, shaking all over  First time he used ubrelvy May 21st, 14 days later he was buying stuff over [employer]. Took 2 tablets of Ubrelvy 6/8  and took  1 tablet 6/10 and the impulse to buy things have continued has lessened because   At this same time tapered off of topiramate. Has been off of topiramate a week around June 5. Has been on topiramate since February. Had a three day headache. Was taking 50mg twice daily, then 25mg twice daily for 4 days  then 25mg once daily for 7 days and then discontinued  Will be starting depakote for migraine.      Today's Vitals: There were no vitals taken for this visit.  ----------------    I spent 20 minutes with this patient today. All changes were made via collaborative practice agreement with Ksenia Lyles. A copy of the visit note was provided to the patient's provider(s).    A summary of these recommendations was sent via [employer].    Radha Mcdonald, Pharm.D, BCACP  Medication Therapy Management Pharmacist    Telemedicine Visit Details  Type of service:  Telephone visit  Start Time: 2:02PM  End Time: 2:22PM     Medication Therapy Recommendations  No medication therapy recommendations to display

## 2024-06-13 ASSESSMENT — PAIN SCALES - PAIN ENJOYMENT GENERAL ACTIVITY SCALE (PEG)
INTERFERED_GENERAL_ACTIVITY: 6
AVG_PAIN_PASTWEEK: 5
INTERFERED_ENJOYMENT_LIFE: 6
PEG_TOTALSCORE: 5.67

## 2024-06-16 ENCOUNTER — HEALTH MAINTENANCE LETTER (OUTPATIENT)
Age: 49
End: 2024-06-16

## 2024-06-17 ENCOUNTER — VIRTUAL VISIT (OUTPATIENT)
Dept: PALLIATIVE MEDICINE | Facility: OTHER | Age: 49
End: 2024-06-17
Attending: NURSE PRACTITIONER
Payer: MEDICARE

## 2024-06-17 DIAGNOSIS — M45.8 ANKYLOSING SPONDYLITIS OF SACRAL REGION (H): ICD-10-CM

## 2024-06-17 DIAGNOSIS — G89.29 CHRONIC INTRACTABLE PAIN: Primary | ICD-10-CM

## 2024-06-17 DIAGNOSIS — M47.816 LUMBAR FACET ARTHROPATHY: ICD-10-CM

## 2024-06-17 DIAGNOSIS — G43.111 INTRACTABLE MIGRAINE WITH AURA WITH STATUS MIGRAINOSUS: ICD-10-CM

## 2024-06-17 DIAGNOSIS — M47.817 LUMBOSACRAL SPONDYLOSIS WITHOUT MYELOPATHY: ICD-10-CM

## 2024-06-17 DIAGNOSIS — G62.9 PERIPHERAL POLYNEUROPATHY: ICD-10-CM

## 2024-06-17 DIAGNOSIS — M51.369 DDD (DEGENERATIVE DISC DISEASE), LUMBAR: ICD-10-CM

## 2024-06-17 PROCEDURE — G2211 COMPLEX E/M VISIT ADD ON: HCPCS | Mod: 95 | Performed by: NURSE PRACTITIONER

## 2024-06-17 PROCEDURE — 99215 OFFICE O/P EST HI 40 MIN: CPT | Mod: 95 | Performed by: NURSE PRACTITIONER

## 2024-06-17 RX ORDER — DIVALPROEX SODIUM 250 MG/1
250 TABLET, DELAYED RELEASE ORAL 2 TIMES DAILY
COMMUNITY
End: 2024-08-06 | Stop reason: DRUGHIGH

## 2024-06-17 RX ORDER — METOCLOPRAMIDE 5 MG/1
5 TABLET ORAL 4 TIMES DAILY PRN
COMMUNITY

## 2024-06-17 RX ORDER — OXYCODONE HYDROCHLORIDE 5 MG/1
5 TABLET ORAL 2 TIMES DAILY PRN
Qty: 60 TABLET | Refills: 0 | Status: SHIPPED | OUTPATIENT
Start: 2024-06-17 | End: 2024-07-20

## 2024-06-17 ASSESSMENT — PAIN SCALES - GENERAL: PAINLEVEL: MILD PAIN (3)

## 2024-06-17 NOTE — PROGRESS NOTES
[facility]      Joel Pineda is a 48 year old male who is being evaluated via a billable virtual visit.      VIDEO VISIT   How would you like to obtain your AVS? MyChart  If you are dropped from the video visit, the video invite should be resent to: Text to cell phone: [phone]  Will anyone else be joining your video visit? NO,  Is patient CURRENTLY in [place]? YES  If patient encounters technical issues they should call [phone]    Video-Visit Details  Type of service:  Video Visit  Video Start Time: 3:10 PM  Video End Time: 3:38 PM  Total Face to Face Time: 28 minutes   Originating Location (pt. Location): Home  Distant Location (provider location):  [facility]   Platform used for Video Visit: Sophia      CHIEF COMPLAINT: Chronic pain    INTERVAL HISTORY:  Last seen on 4/23/2024.        Recommendations/plan at the last visit included:  Health Psychology: YES, Continue per your psychologist's recommendations.  Physical therapy: YES, Continue per therapist's recommendations.   30 minute Video or Clinic follow-up with JOCELYN Zavala, NP-C in 2 months or sooner as needed.  Ok to schedule up to three follow up appts at a time, alternating virtual and clinic appointments.   Imaging: X-ray lumbar spine:   [facility]  [phone]. [facility] - [phone]  Medication Management :   Continue current medications.     Since last visit:   - Has started seeing Dr Shirley Bartlett in Pain Psychology. Feels that it's going well.   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 It was a surgical procedure and he was under anesthesia. Injection has to be repeated every 3 months. The other option is an implant.  Mid back pain after this procedure due to positioning.   - Topiramate discontinued and Depakote started. Just started Depakote for migraine prevention so unsure if it works or not.     Pain Information Today: Score: Mild Pain  (3)/10. Location of pain: multifocal pain     Annual Requirements last collected:  4/8/24     Current Pain Relevant Medications:    Acetaminophen 500 mg BID & with Oxycodone.   Cymbalta 120 mg in AM  Lyrica 300 MG BID   Methocarbamol 500 mg 1-2 QID PRN  Nabumetone 500 mg 1-2 times a day  Lidocaine gel topically 2-3 times daily  Risperdal 0.5 mg 1-2 x daily      Pain Related Controlled Substances:   Clonazepam 0.5 mg, 2 tabs at HS.  Lunesta 3 mg  Oxycodone 5 mg 1-2 tabs per day PRN              Total opiate dose: 7.5-15 MME     Previous Pain Relevant Medications: (H--helped; HI--Helped initially; [facility]--Somewhat helpful; NH--No help; W--worse; SE--side effects; ?--Unsure if helpful)   NOTE: This medication information taken from patient's intake form, not medical records.   Opiates: Oxycodone: H, Tramadol:[facility]. SE, Codeine: NH  NSAIDS: Celebrex: [facility], Nabumetone:H, Naproxen: SE  Anti-migraine medications: Midrin? , Maxalt:H  Muscle Relaxants: Baclofen:[facility], Flexeril:H, Tizaidine:?, Methocarbamol:?  Neuropathics: Lyrica:H  Anti-depressants: Abilify:SE, Brintellix: NH, Wellbutrin: ?, Cymbalta: NH, Nortriptyline: NH, Seroquel:   [facility], Risperdal: NH, Effexor:?, Cymbalta ?  Anxiety medications: Xanax: H, Klonpin: H, lorazepam: H      Topicals: Lidocaine:H  Sleep medications:Belsomra: NH, Melatonin:H, Trazodone NH, Ambien:NH  Other medications not covered above: Humira: All, Enbrel; H, dicyclomine:H, Medrol:[facility], Prednisone:H     Any illicit drug use: denies   EtOH use: none   Caffeine use: 6-8 per day   Nicotine use: None     Past Pain Treatments:   Pain Clinic:   Yes  [facility] pain management: For spinal injections with Dr Diaz, [facility]: injections, nerve ablation, [facility] for SCS treatment.  Did trial but did not find it beneficial.   [facility] chronic pain program.    PT: Yes  For other reasons. Neck, back and feet  Psychologist: Yes ongoing with private therapist.at  [facility].   Chiropractor: Yes years ag               Acupuncture: Yes NH  Pharmacotherapy:  Opioids: Yes  Non-opioids:    Yes   TENs Unit:Yes H for back   Injections: Yes Many for neck and back over the years.      Surgeries related to pain: Yes   October 2007 L5-S1 laminectomy, micro discectomy          THE 4 As OF OPIOID MAINTENANCE ANALGESIA    Analgesia: Is pain relief clinically significant? YES   Activity: Is patient functional and able to perform Activities of Daily Living? YES   Adverse effects: Is patient free from adverse side effects from opiates? YES   Adherence to Rx protocol: Is patient adhering to Controlled Substance Agreement and taking medications ONLY as ordered? YES     Is Narcan prescribed for opiate use >50 MME daily or concurrent use of opiates and benzodiazepines?  Yes    [place] Board  Pharmacy Data Base Reviewed:    YES; As expected, no concern for misuse/abuse of controlled medications based on this report. Reviewed [facility] June 16, 2024- no concerning fills.    _______________________________________________      Physical Exam and Vital Signs not completed due to virtual visit.     General: Verbal, alert and no distress   Psychiatric:  affect and mood normal, mentation appears normal.     DIRE Score for ongoing opioid management is calculated as follows:    Diagnosis = 3 pts (advanced condition; severe pain/objective findings)    Intractability = 3 pts (patient fully engaged but inadequate response to treatments)    Risk        Psych = 3 pts (no significant personality dysfunction/mental illness; good communication with clinic)         Chem Hlth = 3 pts (no history of chemical dependency; not drug-focused)       Reliability = 2 pts (occasional difficulties with compliance; generally reliable)       Social = 2 pts (reduction in some relationships/life rolls)       (Psych + Chem hlth + Reliability + Social) = 16    Efficacy = 2 pts (moderate benefit/function; low med dose; too early/not tried meds)    DIRE Score = 18        7-13: likely  NOT suitable candidate for long-term opioid analgesia       14-21: may be a suitable candidate for long-term opioid analgesia     Previous Diagnostic Tests:   Imaging Studies:   MR LUMBAR SPINE W/O CONTRAST 6/27/2019  Impression:   1. Multilevel lumbar spondylosis, most pronounced at L5-S1 with  moderate to severe left and moderate right neural foraminal stenosis as well as narrowing of the left lateral recess and abutment the traversing left S1 nerve root.   2. Additional multilevel degenerative changes of the lumbar spine as described above.     PAIN RELEVANT CONDITIONS:   1.  Ankylosing spondylosis, Lumbar DDD with left S1 nerve involvement, lumbar stenosis  2.  Chronic migraine headaches  3.  Chronic cervical pain, DDD  4.  Peripheral small fiber neuropathy      ASSESSMENT AND PLAN    (G89.29) Chronic intractable pain  (primary encounter diagnosis)  (M51.36) DDD (degenerative disc disease), lumbar  (M47.817) Lumbosacral spondylosis without myelopathy  (M47.816) Lumbar facet arthropathy  (G43.111) Intractable migraine with aura with status migrainosus  (M45.8) Ankylosing spondylitis of sacral region (H)  (G62.9) Peripheral polyneuropathy  Comment: Will update MRI of lumbar spine, then determine options for treatment. Discussed the importance of increasing his activity level.   Plan: oxyCODONE (ROXICODONE) 5 MG tablet  MR Lumbar Spine w/o Contrast,       PATIENT INSTRUCTIONS:     Diagnosis reviewed, treatment option addressed, and risk/benefits discussed.  Self-care instructions given.  I am recommending a multidisciplinary treatment plan to help this patient better manage pain.    Remember to request ALL medication refills 5-7 BUSINESS days before you run out.     Health Psychology: YES, Continue per your psychologist's recommendations.  30 minute Clinic follow-up with JOCELYN Zavala, NP-C in 1 week after MRI   Imaging: MRI of Lumbar Spine: [facility]  [phone], [facility]  [phone]  Medication Management : Continue current medications.     I have reviewed the note as documented above.  This accurately captures the substance of my conversation with the patient.    BILLING TIME DOCUMENTATION:   TOTAL TIME on the date of service includes:   Time spent preparing to see the patient: 5 minutes (reviewing records and tests)  Time spend face to face with the patient: 28 minutes  Time spent ordering tests, medications, procedures and referrals:  minutes  Time spent Referring and communicating with other healthcare professionals:  minutes  Documenting clinical information in Epic: 7 minutes    The total TIME spent on this patient on the day of the appointment was 40 minutes.     JOCELYN Padgett, NP-C  [facility]    (Information in italics and blue color are taken from previous pain and consulting medical providers notes and are documented as such)    [facility] Scheduling Numbers     [place] [phone]  [facility]  [phone]  [facility]([place]):  [phone]  [place] ([facility]):  Imaging [phone]-  [facility] ([place]): [phone] or [facility]  number [phone]  [facility] imaging- [phone]  [facility] - [phone]  [facility] ([place]): [phone]     Please call [facility] ([facility]/[place]) [phone] or [facility] ([facility]/[place]) [phone] to schedule your MRI.

## 2024-06-18 ENCOUNTER — VIRTUAL VISIT (OUTPATIENT)
Dept: PALLIATIVE MEDICINE | Facility: CLINIC | Age: 49
End: 2024-06-18
Payer: MEDICARE

## 2024-06-18 DIAGNOSIS — M51.369 DDD (DEGENERATIVE DISC DISEASE), LUMBAR: ICD-10-CM

## 2024-06-18 DIAGNOSIS — G43.111 INTRACTABLE MIGRAINE WITH AURA WITH STATUS MIGRAINOSUS: ICD-10-CM

## 2024-06-18 DIAGNOSIS — M45.8 ANKYLOSING SPONDYLITIS OF SACRAL REGION (H): ICD-10-CM

## 2024-06-18 DIAGNOSIS — M47.816 LUMBAR FACET ARTHROPATHY: ICD-10-CM

## 2024-06-18 DIAGNOSIS — G89.29 CHRONIC INTRACTABLE PAIN: Primary | ICD-10-CM

## 2024-06-18 DIAGNOSIS — G62.9 PERIPHERAL POLYNEUROPATHY: ICD-10-CM

## 2024-06-18 DIAGNOSIS — M47.817 LUMBOSACRAL SPONDYLOSIS WITHOUT MYELOPATHY: ICD-10-CM

## 2024-06-18 PROCEDURE — 96159 HLTH BHV IVNTJ INDIV EA ADDL: CPT | Mod: 95 | Performed by: PSYCHOLOGIST

## 2024-06-18 PROCEDURE — 96158 HLTH BHV IVNTJ INDIV 1ST 30: CPT | Mod: 95 | Performed by: PSYCHOLOGIST

## 2024-06-18 NOTE — PROGRESS NOTES
Joel Pineda is a 48 year old male who is being evaluated via a billable video visit.      Patient is currently in the [facility]? yes    Patient would like the video invitation sent by: Text to cell phone: [phone]}    Video Start Time: 3:00 PM  Video Stop Time: 3:40 PM    Additional provider notes:     Pain Diagnoses per pain provider:   Chronic intractable pain    DDD (degenerative disc disease), lumbar    Lumbosacral spondylosis without myelopathy    Lumbar facet arthropathy    Intractable migraine with aura with status migrainosus    Ankylosing spondylitis of sacral region (H)    Peripheral polyneuropathy        DATA: During today's visit you reported the following: Your chronic pain is variable - had a lower than usual day recently, but in general 'is slow path in the wrong direction, moving slowly towards increased'. Your mood is perhaps worse since discontinuing Topamax, some increased irritability and low mood. Your activity level is mildly increased, but report your baseline is 'fairly low'. Your stress level is about the same. Your sleep is disrupted, but this is primarily due to cats waking you up. You reported engaging in self-care for your pain 2-3 times daily.    You identified that you would like to focus on the following or had questions regarding the following issues or concerns, and we discussed the following:   - feel brain fog has been high  - didn't have to stay overnight after vocal cord injection - things went well all told, a lot of pain day after but it was better with time, reduced talking as long periods of talking can be painful  - they are suggesting permanent implants in vocal cords instead of repeat injections every three months  - back on Embrel after being off of it for a while, took 2nd dose yesterday - discussed could be another couple weeks before you note benefit  - determined that yogurt is a migraine trigger  - hoping depakote will help with migraines - no  blood level tests yet - encouraged to ask about this  - botox has been most helpful for migraines    ASSESSMENT: Tito reports pain is heightened, likely due to weather changes and ongoing stressors. He does report his vocal cord injections were successful, however he is still limited in how long he can speak before he feels strained. He notes increased brain fog, which is noted today in terms of tangentiality and somewhat slowed response. He also has not been sleeping well, which coupled with his pain being higher could be contributing to his fatigue and brain fog.     PLAN:   Your next appointment is scheduled for 7/10 at 2:00 PM.  Assignment/Objectives /interventions for next session:   - check in with neurology clinic about if you need blood work to check levels for depakote  - continue to focus on rest, using skills and staying in your limits for physical activity with gentle progressively increased time spent engaging in physical activity    We believe regular attendance is key to your success in our program!    Any time you are unable to keep your appointment we ask that you call us at [phone] at least 24 hours in advance to cancel.This will allow us to offer the appointment time to another patient.   Multiple missed appointments may lead to dismissal from the clinic.    Telemedicine Visit: The patient's condition can be safely assessed and treated via synchronous audio and visual telemedicine encounter.      Reason for Telemedicine Visit: Services only offered telehealth    Originating Site (Patient Location): Patient's home    Distant Site (Provider Location): Provider Remote Setting- Home Office    Consent:  The patient/guardian has verbally consented to: the potential risks and benefits of telemedicine (video visit) versus in person care; bill my insurance or make self-payment for services provided; and responsibility for payment of non-covered services.     Mode of Communication:  Video Conference  via [employer]    As the provider I attest to compliance with applicable laws and regulations related to telemedicine.      Shirley Bartlett PsyD LP  Licensed Psychologist  Outpatient Clinic Therapist  [facility]

## 2024-06-19 ENCOUNTER — MYC MEDICAL ADVICE (OUTPATIENT)
Dept: PALLIATIVE MEDICINE | Facility: OTHER | Age: 49
End: 2024-06-19
Payer: MEDICARE

## 2024-06-19 DIAGNOSIS — M15.0 PRIMARY OSTEOARTHRITIS INVOLVING MULTIPLE JOINTS: ICD-10-CM

## 2024-06-19 DIAGNOSIS — M53.3 SI (SACROILIAC) JOINT DYSFUNCTION: Primary | ICD-10-CM

## 2024-06-20 NOTE — PROGRESS NOTES
[facility] Dermatology Note  Encounter Date: Jun 21, 2024  Office Visit     Dermatology Problem List:  1. Immunosuppresion for ankylosing spondylitis, on Enbrel.  2. Acne vulgaris s/p Accutane in 1990's  3. Folliculitis  - BPO wash, Hibiclens, clobetasol for flares  4. HS - active lesion on the left buttock, excision 4/7/22  - doxy 100mg BID x 3 months (initiated 2/22/22); BPO wash/Hibiclens; topical clindamycin, triamcinolone  5. Repigmented nevus - lower back - Will obtained record - Recheck with Dr. Tilley  6. Cyst - left chest pending excision with Dr. Tilley    ____________________________________________    Assessment & Plan:    # History of immunosuppression. On Enbrel for ankylosing spondylitis  - ABCDEs: Counseled ABCDEs of melanoma: Asymmetry, Border (irregularity), Color (not uniform, changes in color), Diameter (greater than 6 mm which is about the size of a pencil eraser), and Evolving (any changes in preexisting moles).  - Sun protection: Counseled SPF30+ sunscreen, UPF clothing, sun avoidance, tanning bed avoidance.  - Recommended regular skin checks.    # Hidradenitis suppurativa  - On BP wash, Hibaclens, clindamycin topical. Refills submitted for clindamycin today.    # Linear erosion, 2 mm in the fold of R groin. Appears to be external.   - Apply thin layer of Vaseline. Should heal on its own. Call clinic if not clear in 2 weeks.    # Scaly patch on the R 3rd digit. Could be dyshidrotic eczema  - Use triamcinolone cream for 2 weeks. Refilled today.           Procedures Performed:   none    Follow-up: 1 year(s) in-person, or earlier for new or changing lesions    Staff and Scribe:     Scribe Disclosure:   Asiya DEVRIES, am serving as a scribe to document services personally performed by Janice Gonzales MD based on data collection and the provider's statements to me.     Provider Disclosure:   The documentation recorded by the scribe accurately reflects the services I personally  performed and the decisions made by me.    Janice Gonzales MD    Department of Dermatology  [facility]: Phone: [phone], Fax:[phone]  [facility]: Phone: [phone], Fax: [phone]   ____________________________________________    CC: Skin Check (Pt is here for a FBSC, both hands have a raised rash for 3 days, have used triamcinolone-his is , rash blew up between the legs- smelly, painful, thinks that it may be from a deodorant, wants you to look at topical, wants refill of clind in 30g tube. The groin issue started with the possibly with the ozempic. The hands may be related to Embrel.)    HPI:  Mr. Joel Pineda is a(n) 48 year old male who presents today as a return patient for skin check.    Today, patient reports a rash on the hand and in groin folds. Has been using a whole body deodorant in area. Would like refills on triamcinolone and clindamycin lotion. Is on Ozempic. Has lost about 70 pounds since .       Patient is otherwise feeling well, without additional skin concerns.    Labs Reviewed:  N/A    Physical Exam:  Vitals: There were no vitals taken for this visit.  SKIN: Total skin excluding the undergarment areas was performed. The exam included the head/face, neck, both arms, chest, back, abdomen, both legs, digits and/or nails.  [place] genital exam  - Scaly patch on the R 3rd digit.  - 2 mm Linear erosion in the fold of the R groin    - No other lesions of concern on areas examined.     Medications:  Current Outpatient Medications   Medication Sig Dispense Refill    acetaminophen 500 MG CAPS Take 500 mg by mouth 3 times daily      albuterol (PROAIR HFA/PROVENTIL HFA/VENTOLIN HFA) 108 (90 Base) MCG/ACT inhaler Inhale 2 puffs into the lungs every 6 hours as needed for shortness of breath, wheezing or cough 90 day supply 25.5 g 3    B-D LUER-LUCILLE SYRINGE 25G X  "1\" 3 ML MISC USE WITH B12 INJECTIONS 12 each 0    bisacodyl (DULCOLAX) 5 MG EC tablet Take 10 mg by mouth as needed for constipation      buPROPion (WELLBUTRIN XL) 300 MG 24 hr tablet Take 1 tablet by mouth daily      cholecalciferol (D3-50) 1250 mcg (95286 units) capsule TAKE ONE CAPSULE BY MOUTH EVERY 2 WEEKS 24 capsule 0    clonazePAM (KLONOPIN) 0.5 MG tablet Take 1 mg by mouth At Bedtime For RBD      cyanocobalamin (CYANOCOBALAMIN) 1000 MCG/ML injection INJECT 1 ML INTO THE MUSCLE EVERY 30 DAYS 10 mL 0    diphenhydrAMINE (BENADRYL) 25 MG capsule Take 25 mg by mouth once a week Before Enbrel injection      divalproex sodium delayed-release (DEPAKOTE) 250 MG DR tablet Take 250 mg by mouth 2 times daily Migraine prevention      docusate sodium (COLACE) 50 MG capsule Take 100 mg by mouth 2 times daily      DULoxetine (CYMBALTA) 60 MG capsule Take 120 mg by mouth daily       EPINEPHrine (ADRENACLICK JR) 0.15 MG/0.15ML injection 2-pack Inject 0.15 mg into the muscle as needed for anaphylaxis May repeat one time in 5-15 minutes if response to initial dose is inadequate. This medication stays on the medication list as an \"as needed in case of emergency\" medication.      eszopiclone (LUNESTA) 3 MG tablet Take 3 mg by mouth At Bedtime       etanercept (ENBREL SURECLICK) 50 MG/ML autoinjector Inject 50 mg Subcutaneous once a week . Hold for signs of infection, and seek medical attention. 4 mL 7    famotidine (PEPCID) 20 MG tablet Take 20 mg by mouth daily      fenofibrate (TRICOR) 48 MG tablet TAKE ONE TABLET BY MOUTH ONCE DAILY 90 tablet 0    fexofenadine (ALLEGRA) 180 MG tablet Take 180 mg by mouth daily      fluticasone (FLONASE) 50 MCG/ACT nasal spray Spray 2 sprays in nostril daily      fluticasone-salmeterol (WIXELA INHUB) 100-50 MCG/ACT inhaler Inhale 1 puff into the lungs every 12 hours 3 each 3    guaiFENesin (MUCINEX) 600 MG 12 hr tablet Take 1 tablet (600 mg) by mouth daily      levothyroxine " "(SYNTHROID/LEVOTHROID) 75 MCG tablet TAKE ONE TABLET BY MOUTH EVERY MORNING 90 tablet 2    lidocaine (XYLOCAINE) 5 % external ointment Apply topically 2 times daily as needed for moderate pain 50 g 3    melatonin 3 MG tablet Take 13 mg by mouth nightly as needed       methocarbamol (ROBAXIN) 500 MG tablet TAKE 1 - 2 TABLETS BY MOUTH 4 TIMES DAILY AS NEEDED FOR MUSCLE SPASMS 240 tablet 1    metoclopramide (REGLAN) 10 MG tablet Take 10 mg by mouth 4 times daily (before meals and nightly) Takes half a tab for gastroperisis      metoprolol succinate ER (TOPROL XL) 100 MG 24 hr tablet TAKE 1 TABLET BY MOUTH IN THE EVENING; TO BE USED WITH 200MG IN MORNING 90 tablet 0    metoprolol succinate ER (TOPROL XL) 200 MG 24 hr tablet TAKE 1 TABLET BY MOUTH TWICE DAILY 180 tablet 0    montelukast (SINGULAIR) 10 MG tablet TAKE ONE TABLET BY MOUTH EVERY EVENING 90 tablet 0    nabumetone (RELAFEN) 500 MG tablet Take 1-2 tablets (500-1,000 mg) by mouth 2 times daily as needed for moderate pain 120 tablet 1    naloxone (NARCAN) 4 MG/0.1ML nasal spray Spray 4 mg into one nostril alternating nostrils as needed for opioid reversal every 2-3 minutes until assistance arrives. This medication is an antidote and stays on the chart as an \"as needed\" medication      olopatadine (PATADAY) 0.2 % ophthalmic solution Place 1 drop into both eyes daily 2.5 mL 3    omega-3 acid ethyl esters (LOVAZA) 1 g capsule TAKE TWO CAPSULES BY MOUTH TWICE DAILY 360 capsule 2    omeprazole (PRILOSEC) 20 MG DR capsule Take 20 mg by mouth as needed      OnabotulinumtoxinA (BOTOX IJ)       ondansetron (ZOFRAN ODT) 8 MG ODT tab Take 1 tablet (8 mg) by mouth every 8 hours as needed for nausea 20 tablet 0    oxyCODONE (ROXICODONE) 5 MG tablet Take 1 tablet (5 mg) by mouth 2 times daily as needed for breakthrough pain Ok to fill 06/23/24 and begin using on 06/25/24. #30 days supplyfor chronic pain 60 tablet 0    pregabalin (LYRICA) 150 MG capsule Take 1 capsule (150 mg) " by mouth 3 times daily 270 capsule 1    pyridostigmine (MESTINON) 60 MG tablet Take 1 tablet by mouth 3 times daily      ramipril (ALTACE) 10 MG capsule TAKE ONE CAPSULE BY MOUTH ONCE DAILY 90 capsule 0    riboflavin 100 MG CAPS Take 200 mg by mouth daily Two hundred milligrams nightly to prevent migraine      risperiDONE (RISPERDAL) 0.5 MG tablet Take 0.5 mg by mouth 2 times daily as needed      rosuvastatin (CRESTOR) 40 MG tablet TAKE ONE TABLET BY MOUTH ONCE DAILY 90 tablet 2    Semaglutide, 1 MG/DOSE, (OZEMPIC) 4 MG/3ML pen Inject 1 mg Subcutaneous once a week 3 mL 11    sodium chloride 0.9 % neb solution Take 3 mLs by nebulization every 3 hours as needed      sodium fluoride 1.1 % CREA At Bedtime      UBRELVY 100 MG tablet Take 100 mg by mouth at onset of headache      valACYclovir (VALTREX) 500 MG tablet Take 1 tablet (500 mg) by mouth daily 90 tablet 3    vitamin B complex with vitamin C (STRESS TAB) tablet Take 1 tablet by mouth daily      ZINC SULFATE-VITAMIN C MT Take 1 tablet by mouth daily       Current Facility-Administered Medications   Medication Dose Route Frequency Provider Last Rate Last Admin    2 mL lidocaine injection 2%  1 mL   Peng Perez DPCHEY   1 mL at 09/22/23 1256    triamcinolone acetonide (KENALOG-10) injection 10 mg  10 mg   Peng Perez DPM   10 mg at 09/22/23 1256      Past Medical History:   Patient Active Problem List   Diagnosis    Intermittent asthma    Chronic nonallergic rhinitis    Diverticulosis    GERD (gastroesophageal reflux disease)    Generalized anxiety disorder    LLOYD (obstructive sleep apnea)- mild (AHI 11)    Intracranial arachnoid cyst    Facet arthritis of cervical region    Acquired hypothyroidism    Chronic midline low back pain without sciatica    Irritable bowel syndrome with diarrhea    B12 deficiency    Essential hypertension with goal blood pressure less than 140/90    Chronic, continuous use of opioids    Ankylosing spondylitis of sacral region (H)     Morbid obesity (H)    Fatty infiltration of liver    DDD (degenerative disc disease), lumbar    Type 2 diabetes mellitus with complication, without long-term current use of insulin (H)    Peripheral polyneuropathy    History of pulmonary embolism    Ingrown toenail    Hypertriglyceridemia    Gastroparesis    Orthostatic dizziness    POTS (postural orthostatic tachycardia syndrome)    PHN (postherpetic neuralgia)    Dysautonomia (H)    Chronic pain syndrome    Borderline personality disorder (H)    MDD (major depressive disorder), recurrent severe, without psychosis (H)    Folliculitis    Immunosuppression (H24)    Small fiber neuropathy    RBD (REM behavioral disorder)    Hyperlipidemia LDL goal <70    Anal fissure    Anorectal disorder    Benign neoplasm of ascending colon    Benign neoplasm of cecum    Altered bowel function    Digestive symptom    Dysphagia    Generalized abdominal pain    History of colitis    Internal hemorrhoids    Nausea    Perianal abscess    Hemorrhage of rectum and anus    Therapeutic opioid induced constipation     Past Medical History:   Diagnosis Date    Acne     Acquired hypothyroidism     Allergic state     Ankylosing spondylitis lumbar region (H)     Ankylosing spondylitis of sacral region (H)     Anxiety     Bipolar 2 disorder (H)     Chest pain     Chest pain, regulated w/BP meds. Clear arteries.    Chronic pain     DDD (degenerative disc disease), lumbar     Depressive disorder     Diabetes (H)     Diverticulosis     Facet arthritis of cervical region     Gastroesophageal reflux disease     Hypertension     IBS (irritable bowel syndrome)     Intracranial arachnoid cyst     Major depressive disorder, recurrent episode (H24)     Multiple psych providers - they manage meds August 2015: Provigil induced severe mood dis-function     Major depressive disorder, recurrent episode, severe (H) 12/2/2020    MDD (major depressive disorder), recurrent severe, without psychosis (H) 7/21/2021     Mixed dyslipidemia 4/6/2023    LLOYD (obstructive sleep apnea)- mild (AHI 11)     Polyneuropathy     Pulmonary embolism (H)     Skin exam, screening for cancer 12/3/2013    Sleep apnea     Uncomplicated asthma         CC Janice Gonzales MD  [address] [place] [phone] on close of this encounter.     Yes

## 2024-06-20 NOTE — PATIENT INSTRUCTIONS

## 2024-06-21 ENCOUNTER — OFFICE VISIT (OUTPATIENT)
Dept: DERMATOLOGY | Facility: CLINIC | Age: 49
End: 2024-06-21
Payer: MEDICARE

## 2024-06-21 DIAGNOSIS — L73.9 FOLLICULITIS: ICD-10-CM

## 2024-06-21 DIAGNOSIS — L30.9 DERMATITIS: Primary | ICD-10-CM

## 2024-06-21 PROCEDURE — 99214 OFFICE O/P EST MOD 30 MIN: CPT | Performed by: DERMATOLOGY

## 2024-06-21 RX ORDER — TRIAMCINOLONE ACETONIDE 1 MG/G
CREAM TOPICAL
Qty: 80 G | Refills: 3 | Status: ON HOLD | OUTPATIENT
Start: 2024-06-21 | End: 2024-09-25

## 2024-06-21 NOTE — PROGRESS NOTES
Outcome for 06/21/24 1:21 PM: Patient is not checking blood sugars  Radha Estevez, LUDA  Adult Endocrinology   [facility]            Endocrinology and Diabetes [facility]             Follow up for hypothyroidism, obesity, low testosterone and T2DM      Assessment:  Middle-aged man with morbid obesity, type 2 diabetes, hypothyroidism and co-morbidity of dyslipidemia POTS chronic pain syndrome on chronic pain medications, bipolar disorder, migraines    Obesity patient had been on Ozempic 1 mg weekly, he tolerates this well; he notes that he has some nausea which might be related to Ozempic or gastroparesis. He uses reglan. He has lost 35 pounds since summer 2022 and continues to lose weight.    Has started on Depakote.    Type 2 diabetes patient is doing very well on Ozempic.  A1c in the normal range, diabetes is in remission     Low testosterone testosterone has been low twice in 2022, patient since has been able to loose more than 10% of his body weight and Reglan which does seem to have caused hyperprolactinemia was decreaesd.  Clinically however patient still has symptoms of low testosterone including very low libido and erectile dysfunction.  Recheck of labs shows testosterone levels in nl rang       Hypothyroidism doing well on levothyroxine 75 mcg daily      Plan:   Continue levothyroxine 75 mcg daily  Continue Ozempic to 1 mg weekly; pt is on pt assistance program, which PCP is unable to provide       The Longitudinal plan of care for obesity, hypothyroidism, and T2DM was addressed during this visit. Due to added complexity of care, we will continue to support Joel , and the subsequent management of this condition(s) and with the ongoing continuity of care of this condition.      Abbie Cuenca MD  Endocrinology and Diabetes  Telephone contact:  [facility] [place] [phone]  [facility] [phone]        Interval history  Recheck of  testosterone has  been in the normal rnage    Pt stopped Reglan due to elevated Prolactin in 9/23;    Has been on Ozempic 1 mg weekly, started treatment in 2022  Initial problems GI related, doing ok with gastroparesis,     Has been on LT4 75 mcg. Takes this      Testosterone:   Low libido decrease in sex drive, years  Erectile dysfunction no med  Has never been on testosterone before  Sleep: snoring CPAP,     Diabetes:  Has been in remission since weight loss on Ozempic  Prior medications  Intolerant to metformin due to GI side effects  Intolerance to sulfonylureas due to hypoglycemia        Current Problem List:   Patient Active Problem List   Diagnosis    Intermittent asthma    Chronic nonallergic rhinitis    Diverticulosis    GERD (gastroesophageal reflux disease)    Generalized anxiety disorder    LLOYD (obstructive sleep apnea)- mild (AHI 11)    Intracranial arachnoid cyst    Facet arthritis of cervical region    Acquired hypothyroidism    Chronic midline low back pain without sciatica    Irritable bowel syndrome with diarrhea    B12 deficiency    Essential hypertension with goal blood pressure less than 140/90    Chronic, continuous use of opioids    Ankylosing spondylitis of sacral region (H)    Morbid obesity (H)    Fatty infiltration of liver    DDD (degenerative disc disease), lumbar    Type 2 diabetes mellitus with complication, without long-term current use of insulin (H)    Peripheral polyneuropathy    History of pulmonary embolism    Ingrown toenail    Hypertriglyceridemia    Gastroparesis    Orthostatic dizziness    POTS (postural orthostatic tachycardia syndrome)    PHN (postherpetic neuralgia)    Dysautonomia (H)    Chronic pain syndrome    Borderline personality disorder (H)    MDD (major depressive disorder), recurrent severe, without psychosis (H)    Folliculitis    Immunosuppression (H24)    Small fiber neuropathy    RBD (REM behavioral disorder)    Hyperlipidemia LDL goal <70    Anal fissure     Anorectal disorder    Benign neoplasm of ascending colon    Benign neoplasm of cecum    Altered bowel function    Digestive symptom    Dysphagia    Generalized abdominal pain    History of colitis    Internal hemorrhoids    Nausea    Perianal abscess    Hemorrhage of rectum and anus    Therapeutic opioid induced constipation       Past Medical and Past Surgical History:  Past Medical History:   Diagnosis Date    Acne     Acquired hypothyroidism     Allergic state     Ankylosing spondylitis lumbar region (H)     Ankylosing spondylitis of sacral region (H)     Anxiety     Bipolar 2 disorder (H)     Chest pain     Chest pain, regulated w/BP meds. Clear arteries.    Chronic pain     DDD (degenerative disc disease), lumbar     Depressive disorder     Diabetes (H)     Diverticulosis     Facet arthritis of cervical region     Gastroesophageal reflux disease     Hypertension     IBS (irritable bowel syndrome)     Intracranial arachnoid cyst     Major depressive disorder, recurrent episode (H24)     Multiple psych providers - they manage meds August 2015: Provigil induced severe mood dis-function     Major depressive disorder, recurrent episode, severe (H) 12/2/2020    MDD (major depressive disorder), recurrent severe, without psychosis (H) 7/21/2021    Mixed dyslipidemia 4/6/2023    LLOYD (obstructive sleep apnea)- mild (AHI 11)     Polyneuropathy     Pulmonary embolism (H)     Skin exam, screening for cancer 12/3/2013    Sleep apnea     Uncomplicated asthma        Past Surgical History:   Procedure Laterality Date    BACK SURGERY  10/2007    lumbar discectomy L5-S1    BIOPSY  09/15/2020    Colon Adenomas x2    COLONOSCOPY      Note: colonoscopy scheduled with [facility] on Friday, 9/4/15    COSMETIC SURGERY  2012    Nose Exterior - functional    GI SURGERY  08/2013    Sigmoidectomy    HERNIA REPAIR, UMBILICAL  08/23/2011    Dr. Evan whiting    INCISION AND DRAINAGE, ABSCESS, COMPLEX  08/23/2011    Dr. Evan jeff  "[place]    LAPAROSCOPIC ASSISTED COLECTOMY LEFT (DESCENDING)  08/15/2013    Procedure: LAPAROSCOPIC ASSISTED COLECTOMY LEFT (DESCENDING);  Laparoscopic Hand Assisted Sigmoid Resection, Mobilization of Splenic Fissure, coloproctoscopy, *Latex Free Room* Anesthesia General with Pain block  ;  Surgeon: Aurora Justice MD;  Location: [facility] OR    NERVE SURGERY  08/18/2011    RF ablation @ L3-S1 @ [facility]    RECONSTRUCT NOSE AND SEPTUM (FUNCTIONAL)  10/14/2011    Procedure:RECONSTRUCT NOSE AND SEPTUM (FUNCTIONAL); Functional Septorhinoplasty, Turbinate Reduction, ; Surgeon:CEDRIC CUEVAS; Location:[facility] OR    SINUS SURGERY  10/01/2001    ethmoidectomy chronic sinusitis    SOFT TISSUE SURGERY  4/7/2022    Hidradenitis Suppurativa surgery       Medications:       Current Outpatient Medications   Medication Sig Dispense Refill    acetaminophen 500 MG CAPS Take 500 mg by mouth 3 times daily      albuterol (PROAIR HFA/PROVENTIL HFA/VENTOLIN HFA) 108 (90 Base) MCG/ACT inhaler Inhale 2 puffs into the lungs every 6 hours as needed for shortness of breath, wheezing or cough 90 day supply 25.5 g 3    B-D LUER-LUCILLE SYRINGE 25G X 1\" 3 ML MISC USE WITH B12 INJECTIONS 12 each 0    bisacodyl (DULCOLAX) 5 MG EC tablet Take 10 mg by mouth as needed for constipation      buPROPion (WELLBUTRIN XL) 300 MG 24 hr tablet Take 1 tablet by mouth daily      cholecalciferol (D3-50) 1250 mcg (10764 units) capsule TAKE ONE CAPSULE BY MOUTH EVERY 2 WEEKS 24 capsule 0    clonazePAM (KLONOPIN) 0.5 MG tablet Take 1 mg by mouth At Bedtime For RBD      cyanocobalamin (CYANOCOBALAMIN) 1000 MCG/ML injection INJECT 1 ML INTO THE MUSCLE EVERY 30 DAYS 10 mL 0    diphenhydrAMINE (BENADRYL) 25 MG capsule Take 25 mg by mouth once a week Before Enbrel injection      divalproex sodium delayed-release (DEPAKOTE) 250 MG DR tablet Take 250 mg by mouth 2 times daily Migraine prevention      docusate sodium (COLACE) 50 MG capsule Take 100 mg by mouth 2 times daily      " "DULoxetine (CYMBALTA) 60 MG capsule Take 120 mg by mouth daily       EPINEPHrine (ADRENACLICK JR) 0.15 MG/0.15ML injection 2-pack Inject 0.15 mg into the muscle as needed for anaphylaxis May repeat one time in 5-15 minutes if response to initial dose is inadequate. This medication stays on the medication list as an \"as needed in case of emergency\" medication.      eszopiclone (LUNESTA) 3 MG tablet Take 3 mg by mouth At Bedtime       etanercept (ENBREL SURECLICK) 50 MG/ML autoinjector Inject 50 mg Subcutaneous once a week . Hold for signs of infection, and seek medical attention. 4 mL 7    famotidine (PEPCID) 20 MG tablet Take 20 mg by mouth daily      fenofibrate (TRICOR) 48 MG tablet TAKE ONE TABLET BY MOUTH ONCE DAILY 90 tablet 0    fexofenadine (ALLEGRA) 180 MG tablet Take 180 mg by mouth daily      fluticasone (FLONASE) 50 MCG/ACT nasal spray Spray 2 sprays in nostril daily      fluticasone-salmeterol (WIXELA INHUB) 100-50 MCG/ACT inhaler Inhale 1 puff into the lungs every 12 hours 3 each 3    guaiFENesin (MUCINEX) 600 MG 12 hr tablet Take 1 tablet (600 mg) by mouth daily      levothyroxine (SYNTHROID/LEVOTHROID) 75 MCG tablet TAKE ONE TABLET BY MOUTH EVERY MORNING 90 tablet 2    lidocaine (XYLOCAINE) 5 % external ointment Apply topically 2 times daily as needed for moderate pain 50 g 3    melatonin 3 MG tablet Take 13 mg by mouth nightly as needed       methocarbamol (ROBAXIN) 500 MG tablet TAKE 1 - 2 TABLETS BY MOUTH 4 TIMES DAILY AS NEEDED FOR MUSCLE SPASMS 240 tablet 1    metoclopramide (REGLAN) 10 MG tablet Take 10 mg by mouth 4 times daily (before meals and nightly) Takes half a tab for gastroperisis      metoprolol succinate ER (TOPROL XL) 100 MG 24 hr tablet TAKE 1 TABLET BY MOUTH IN THE EVENING; TO BE USED WITH 200MG IN MORNING 90 tablet 0    metoprolol succinate ER (TOPROL XL) 200 MG 24 hr tablet TAKE 1 TABLET BY MOUTH TWICE DAILY 180 tablet 0    montelukast (SINGULAIR) 10 MG tablet TAKE ONE TABLET BY " "MOUTH EVERY EVENING 90 tablet 0    nabumetone (RELAFEN) 500 MG tablet Take 1-2 tablets (500-1,000 mg) by mouth 2 times daily as needed for moderate pain 120 tablet 1    naloxone (NARCAN) 4 MG/0.1ML nasal spray Spray 4 mg into one nostril alternating nostrils as needed for opioid reversal every 2-3 minutes until assistance arrives. This medication is an antidote and stays on the chart as an \"as needed\" medication      olopatadine (PATADAY) 0.2 % ophthalmic solution Place 1 drop into both eyes daily 2.5 mL 3    omega-3 acid ethyl esters (LOVAZA) 1 g capsule TAKE TWO CAPSULES BY MOUTH TWICE DAILY 360 capsule 2    omeprazole (PRILOSEC) 20 MG DR capsule Take 20 mg by mouth as needed      OnabotulinumtoxinA (BOTOX IJ)       ondansetron (ZOFRAN ODT) 8 MG ODT tab Take 1 tablet (8 mg) by mouth every 8 hours as needed for nausea 20 tablet 0    oxyCODONE (ROXICODONE) 5 MG tablet Take 1 tablet (5 mg) by mouth 2 times daily as needed for breakthrough pain Ok to fill 06/23/24 and begin using on 06/25/24. #30 days supplyfor chronic pain 60 tablet 0    pregabalin (LYRICA) 150 MG capsule Take 1 capsule (150 mg) by mouth 3 times daily 270 capsule 1    pyridostigmine (MESTINON) 60 MG tablet Take 1 tablet by mouth 3 times daily      ramipril (ALTACE) 10 MG capsule TAKE ONE CAPSULE BY MOUTH ONCE DAILY 90 capsule 0    riboflavin 100 MG CAPS Take 200 mg by mouth daily Two hundred milligrams nightly to prevent migraine      risperiDONE (RISPERDAL) 0.5 MG tablet Take 0.5 mg by mouth 2 times daily as needed      rosuvastatin (CRESTOR) 40 MG tablet TAKE ONE TABLET BY MOUTH ONCE DAILY 90 tablet 2    Semaglutide, 1 MG/DOSE, (OZEMPIC) 4 MG/3ML pen Inject 1 mg Subcutaneous once a week 3 mL 11    sodium chloride 0.9 % neb solution Take 3 mLs by nebulization every 3 hours as needed      sodium fluoride 1.1 % CREA At Bedtime      triamcinolone (KENALOG) 0.1 % external cream Apply twice daily for up to 2 weeks per month on hands. 80 g 3    UBRELVY " 100 MG tablet Take 100 mg by mouth at onset of headache      valACYclovir (VALTREX) 500 MG tablet Take 1 tablet (500 mg) by mouth daily 90 tablet 3    vitamin B complex with vitamin C (STRESS TAB) tablet Take 1 tablet by mouth daily      ZINC SULFATE-VITAMIN C MT Take 1 tablet by mouth daily         Allergies:   Allergies   Allergen Reactions    Amoxicillin-Pot Clavulanate Difficulty breathing    Banana Shortness Of Breath     Pt reports organic Banana is okay.     Clavulanic Acid Anaphylaxis     Other Reaction(s): Throat swelling    Nitroglycerin Palpitations    Penicillins Anaphylaxis    Provigil [Modafinil] Shortness Of Breath     headache    Gadolinium Hives and Itching     Patient was premedicated for the contrast allergy. He did still have a reaction a few hours after injection. Hives and itching. Dr. Gomez told tech to inform pt he should only have contrast again in the future when premedicated and at a hospital. Not at an outpatient facility.     Haemophilus B Polysaccharide Vaccine Rash     Quadrivalent, cell based    Influenza Virus Vaccine Rash     Quadrivalent, cell based    Ketoconazole      Topical cream caused swelling and itching    Dye [Contrast Dye] Other (See Comments) and Hives     Moderate flushing, CT contrast  Iodinated and gadolinium    Formoterol     Gabapentin      Other reaction(s): hives    Golimumab      Hives, bradycardia, face swelling    Mometasone     Naproxen      Other reaction(s): Bleeding Gums    Neurontin [Gabapentin] Hives     Moderate hives    Nortriptyline Hives    Nystatin Hives    Varicella Virus Vaccine Live      Rash    Adhesive Tape Rash    Baclofen Other (See Comments)     Cognitive changes    Flagyl [Metronidazole Hcl] Palpitations and Hives    Latex Rash    Metronidazole Palpitations, Other (See Comments) and Rash     dizziness (versus ciprofloxacin taken at same time)    Sulfamethizole Rash     Other Reaction(s): Rash       Social History     Tobacco Use     "Smoking status: Never    Smokeless tobacco: Never   Substance Use Topics    Alcohol use: Not Currently     Comment: occ 1/week       Family History   Problem Relation Age of Onset    Musculoskeletal Disorder Mother         back    Anxiety Disorder Mother     Colon Polyps Mother     Ulcerative Colitis Mother         and ischemic small intestine, surgery    Hypertension Mother     Breast Cancer Mother     Osteoporosis Mother     Diabetes Mother         Type 2, Diagnosed in 2014    Depression Mother         Takes Cymbalta to help with chronic pain + depx    Thyroid Disease Mother         Hypothyroidism    Obesity Mother         Under much better control latter half of 2015    Musculoskeletal Disorder Father         back    Substance Abuse Father         Alcohol    Hypertension Father     Hyperlipidemia Father     Depression Father         Off meds for many years. Seems \"ok\"    Anxiety Disorder Father     Heart Disease Maternal Grandmother     Heart Disease Maternal Grandfather     Psychotic Disorder Paternal Grandfather     Suicide Paternal Grandfather     Depression Paternal Grandfather         Pediatrician. Committed suicide by pistol in 1990.    Musculoskeletal Disorder Brother         back    Depression Brother         Expressed as anger and moodiness    Substance Abuse Brother         Alcohol    Anxiety Disorder Brother     Substance Abuse Sister         Alcohol    Depression Sister         Mental Health Therapist, yet no anti-depressants?    Anxiety Disorder Sister         Mental Health Therapist, yet no anti-anxiety meds?    Other Cancer Other         Bladder    Substance Abuse Brother     Colon Cancer No family hx of     Crohn's Disease No family hx of     Anesthesia Reaction No family hx of     Cancer No family hx of         No family history of skin cancer       Physical Examination:  /78 (BP Location: Left arm, Patient Position: Sitting, Cuff Size: Adult Large)   Pulse 83   Resp 16   Ht 1.95 m (6' " "4.77\")   Wt 128.8 kg (284 lb)   SpO2 95%   BMI 33.88 kg/m      Wt Readings from Last 4 Encounters:   06/10/24 128.4 kg (283 lb)   04/18/24 127.9 kg (282 lb)   04/08/24 127.9 kg (282 lb)   02/29/24 127.5 kg (281 lb)       Wt Readings from Last 4 Encounters:   06/10/24 128.4 kg (283 lb)   04/18/24 127.9 kg (282 lb)   04/08/24 127.9 kg (282 lb)   02/29/24 127.5 kg (281 lb)     10/19/22 : 140.6 kg (310 lb)  08/10/22 : 142.9 kg (315 lb)  07/11/22 : 143.8 kg (317 lb)  06/08/22 : 144.6 kg (318 lb 12.8 oz)      General: Well appearing tall obese man  in no distress,  Eyes: EOMI. Sclerae and conjunctivae are clear.        Labs and Studies:   Recent Labs   Lab Test 12/12/23  0823 02/15/23  0833 08/03/22  0947 02/10/22  0942 11/01/16  1546 05/10/16  0817   FSH 7.1  --  6.9  --   --   --    TSH 1.69 1.56  --  2.59   < >  --    [place]  --   --   --   --   --  9.4    < > = values in this interval not displayed.     Lab Results   Component Value Date    ALT 14 04/13/2024    AST 27 04/13/2024    BILITOTAL 0.4 04/13/2024    CR 1.10 04/13/2024     04/13/2024    TSH 1.69 12/12/2023    ALKPHOS 61 04/13/2024    TESTOSTTOTAL 299 12/12/2023    TESTOSTTOTAL 194 (L) 08/03/2022    TESTOSTTOTAL 203 (L) 07/25/2022    TESTOSTTOTAL 354 03/19/2013    SHBGT 23 12/12/2023    SHBGT 17 08/03/2022    SHBGT 15 07/25/2022    FT 7.14 12/12/2023    FT 5.12 08/03/2022    FT 5.64 07/25/2022    PSA 0.13 12/12/2023    PSA 0.13 12/12/2023    PSA 0.33 09/28/2015    TRIG 305 (H) 12/12/2023    TRIG 277 (H) 02/15/2023    TRIG 367 (H) 04/21/2022    LDL 47 12/12/2023    HDL 33 (L) 12/12/2023    HDL 34 (L) 02/15/2023    HDL 38 (L) 04/21/2022    PROLACTIN 25 (H) 01/19/2012               Lab Results   Component Value Date     04/13/2024    CO2 22 04/13/2024    CHLORIDE 105 04/13/2024    CR 1.10 04/13/2024    TRIG 305 (H) 12/12/2023    HDL 33 (L) 12/12/2023    HGB 13.9 04/13/2024     Lab Results   Component Value Date    ALT 14 04/13/2024    AST 27 " 04/13/2024    BILITOTAL 0.4 04/13/2024    CR 1.10 04/13/2024     04/13/2024    TSH 1.69 12/12/2023    ALKPHOS 61 04/13/2024    TESTOSTTOTAL 299 12/12/2023    TESTOSTTOTAL 194 (L) 08/03/2022    TESTOSTTOTAL 203 (L) 07/25/2022    TESTOSTTOTAL 354 03/19/2013    SHBGT 23 12/12/2023    SHBGT 17 08/03/2022    SHBGT 15 07/25/2022    FT 7.14 12/12/2023    FT 5.12 08/03/2022    FT 5.64 07/25/2022    PSA 0.13 12/12/2023    PSA 0.13 12/12/2023    PSA 0.33 09/28/2015    TRIG 305 (H) 12/12/2023    TRIG 277 (H) 02/15/2023    TRIG 367 (H) 04/21/2022    LDL 47 12/12/2023    HDL 33 (L) 12/12/2023    HDL 34 (L) 02/15/2023    HDL 38 (L) 04/21/2022    PROLACTIN 25 (H) 01/19/2012     Lab Results   Component Value Date     04/13/2024    CHLORIDE 105 04/13/2024    CO2 22 04/13/2024    GLC 94 04/13/2024    CR 1.10 04/13/2024    CR 1.08 12/12/2023    CR 1.01 05/05/2023    CR 0.94 02/15/2023    CR 0.98 10/10/2022    ALYCE 9.9 04/13/2024    MAG 2.1 08/19/2013    ALBUMIN 4.7 04/13/2024    ALKPHOS 61 04/13/2024    LDL 47 12/12/2023    HDL 33 (L) 12/12/2023    TRIG 305 (H) 12/12/2023    TSH 1.69 12/12/2023    TSH 1.56 02/15/2023    TSH 2.59 02/10/2022     Lab Results   Component Value Date    MICROL 14.4 04/13/2024    MICROL 73.0 12/12/2023    MICROL 9 09/23/2022    MICROL 8 11/10/2021    MICROL 8 10/14/2020     Lab Results   Component Value Date    A1C 5.7 (H) 02/15/2023    A1C 6.4 (H) 09/23/2022    A1C 6.3 (H) 02/10/2022    A1C 6.0 (H) 08/19/2021    A1C 6.5 (H) 02/10/2021       Lab Results   Component Value Date    HGB 13.9 04/13/2024

## 2024-06-21 NOTE — LETTER
6/21/2024      Joel Pineda  [address] [place] [address]      Dear Colleague,    Thank you for referring your patient, Joel Pineda, to the [facility]. Please see a copy of my visit note below.      [facility] Dermatology Note  Encounter Date: Jun 21, 2024  Office Visit     Dermatology Problem List:  1. Immunosuppresion for ankylosing spondylitis, on Enbrel.  2. Acne vulgaris s/p Accutane in 1990's  3. Folliculitis  - BPO wash, Hibiclens, clobetasol for flares  4. HS - active lesion on the left buttock, excision 4/7/22  - doxy 100mg BID x 3 months (initiated 2/22/22); BPO wash/Hibiclens; topical clindamycin, triamcinolone  5. Repigmented nevus - lower back - Will obtained record - Recheck with Dr. Tilley  6. Cyst - left chest pending excision with Dr. Tilley    ____________________________________________    Assessment & Plan:    # History of immunosuppression. On Enbrel for ankylosing spondylitis  - ABCDEs: Counseled ABCDEs of melanoma: Asymmetry, Border (irregularity), Color (not uniform, changes in color), Diameter (greater than 6 mm which is about the size of a pencil eraser), and Evolving (any changes in preexisting moles).  - Sun protection: Counseled SPF30+ sunscreen, UPF clothing, sun avoidance, tanning bed avoidance.  - Recommended regular skin checks.    # Hidradenitis suppurativa  - On BP wash, Hibaclens, clindamycin topical. Refills submitted for clindamycin today.    # Linear erosion, 2 mm in the fold of R groin. Appears to be external.   - Apply thin layer of Vaseline. Should heal on its own. Call clinic if not clear in 2 weeks.    # Scaly patch on the R 3rd digit. Could be dyshidrotic eczema  - Use triamcinolone cream for 2 weeks. Refilled today.           Procedures Performed:   none    Follow-up: 1 year(s) in-person, or earlier for new or changing lesions    Staff and Scribe:     Scribe Disclosure:   I, Asiya Baca, am serving as a  scribe to document services personally performed by Janice Gonzales MD based on data collection and the provider's statements to me.     Provider Disclosure:   The documentation recorded by the scribe accurately reflects the services I personally performed and the decisions made by me.    Janice Gonzales MD    Department of Dermatology  [facility]: Phone: [phone], Fax:[phone]  [facility]: Phone: [phone], Fax: [phone]   ____________________________________________    CC: Skin Check (Pt is here for a FBSC, both hands have a raised rash for 3 days, have used triamcinolone-his is , rash blew up between the legs- smelly, painful, thinks that it may be from a deodorant, wants you to look at topical, wants refill of clind in 30g tube. The groin issue started with the possibly with the ozempic. The hands may be related to Embrel.)    HPI:  Mr. Joel Pineda is a(n) 48 year old male who presents today as a return patient for skin check.    Today, patient reports a rash on the hand and in groin folds. Has been using a whole body deodorant in area. Would like refills on triamcinolone and clindamycin lotion. Is on Ozempic. Has lost about 70 pounds since .       Patient is otherwise feeling well, without additional skin concerns.    Labs Reviewed:  N/A    Physical Exam:  Vitals: There were no vitals taken for this visit.  SKIN: Total skin excluding the undergarment areas was performed. The exam included the head/face, neck, both arms, chest, back, abdomen, both legs, digits and/or nails.  [place] genital exam  - Scaly patch on the R 3rd digit.  - 2 mm Linear erosion in the fold of the R groin    - No other lesions of concern on areas examined.     Medications:  Current Outpatient Medications   Medication Sig Dispense Refill     acetaminophen 500 MG CAPS Take 500 mg by mouth 3  "times daily       albuterol (PROAIR HFA/PROVENTIL HFA/VENTOLIN HFA) 108 (90 Base) MCG/ACT inhaler Inhale 2 puffs into the lungs every 6 hours as needed for shortness of breath, wheezing or cough 90 day supply 25.5 g 3     B-D LUER-LUCILLE SYRINGE 25G X 1\" 3 ML MISC USE WITH B12 INJECTIONS 12 each 0     bisacodyl (DULCOLAX) 5 MG EC tablet Take 10 mg by mouth as needed for constipation       buPROPion (WELLBUTRIN XL) 300 MG 24 hr tablet Take 1 tablet by mouth daily       cholecalciferol (D3-50) 1250 mcg (57039 units) capsule TAKE ONE CAPSULE BY MOUTH EVERY 2 WEEKS 24 capsule 0     clonazePAM (KLONOPIN) 0.5 MG tablet Take 1 mg by mouth At Bedtime For RBD       cyanocobalamin (CYANOCOBALAMIN) 1000 MCG/ML injection INJECT 1 ML INTO THE MUSCLE EVERY 30 DAYS 10 mL 0     diphenhydrAMINE (BENADRYL) 25 MG capsule Take 25 mg by mouth once a week Before Enbrel injection       divalproex sodium delayed-release (DEPAKOTE) 250 MG DR tablet Take 250 mg by mouth 2 times daily Migraine prevention       docusate sodium (COLACE) 50 MG capsule Take 100 mg by mouth 2 times daily       DULoxetine (CYMBALTA) 60 MG capsule Take 120 mg by mouth daily        EPINEPHrine (ADRENACLICK JR) 0.15 MG/0.15ML injection 2-pack Inject 0.15 mg into the muscle as needed for anaphylaxis May repeat one time in 5-15 minutes if response to initial dose is inadequate. This medication stays on the medication list as an \"as needed in case of emergency\" medication.       eszopiclone (LUNESTA) 3 MG tablet Take 3 mg by mouth At Bedtime        etanercept (ENBREL SURECLICK) 50 MG/ML autoinjector Inject 50 mg Subcutaneous once a week . Hold for signs of infection, and seek medical attention. 4 mL 7     famotidine (PEPCID) 20 MG tablet Take 20 mg by mouth daily       fenofibrate (TRICOR) 48 MG tablet TAKE ONE TABLET BY MOUTH ONCE DAILY 90 tablet 0     fexofenadine (ALLEGRA) 180 MG tablet Take 180 mg by mouth daily       fluticasone (FLONASE) 50 MCG/ACT nasal spray Spray 2 " "sprays in nostril daily       fluticasone-salmeterol (WIXELA INHUB) 100-50 MCG/ACT inhaler Inhale 1 puff into the lungs every 12 hours 3 each 3     guaiFENesin (MUCINEX) 600 MG 12 hr tablet Take 1 tablet (600 mg) by mouth daily       levothyroxine (SYNTHROID/LEVOTHROID) 75 MCG tablet TAKE ONE TABLET BY MOUTH EVERY MORNING 90 tablet 2     lidocaine (XYLOCAINE) 5 % external ointment Apply topically 2 times daily as needed for moderate pain 50 g 3     melatonin 3 MG tablet Take 13 mg by mouth nightly as needed        methocarbamol (ROBAXIN) 500 MG tablet TAKE 1 - 2 TABLETS BY MOUTH 4 TIMES DAILY AS NEEDED FOR MUSCLE SPASMS 240 tablet 1     metoclopramide (REGLAN) 10 MG tablet Take 10 mg by mouth 4 times daily (before meals and nightly) Takes half a tab for gastroperisis       metoprolol succinate ER (TOPROL XL) 100 MG 24 hr tablet TAKE 1 TABLET BY MOUTH IN THE EVENING; TO BE USED WITH 200MG IN MORNING 90 tablet 0     metoprolol succinate ER (TOPROL XL) 200 MG 24 hr tablet TAKE 1 TABLET BY MOUTH TWICE DAILY 180 tablet 0     montelukast (SINGULAIR) 10 MG tablet TAKE ONE TABLET BY MOUTH EVERY EVENING 90 tablet 0     nabumetone (RELAFEN) 500 MG tablet Take 1-2 tablets (500-1,000 mg) by mouth 2 times daily as needed for moderate pain 120 tablet 1     naloxone (NARCAN) 4 MG/0.1ML nasal spray Spray 4 mg into one nostril alternating nostrils as needed for opioid reversal every 2-3 minutes until assistance arrives. This medication is an antidote and stays on the chart as an \"as needed\" medication       olopatadine (PATADAY) 0.2 % ophthalmic solution Place 1 drop into both eyes daily 2.5 mL 3     omega-3 acid ethyl esters (LOVAZA) 1 g capsule TAKE TWO CAPSULES BY MOUTH TWICE DAILY 360 capsule 2     omeprazole (PRILOSEC) 20 MG DR capsule Take 20 mg by mouth as needed       OnabotulinumtoxinA (BOTOX IJ)        ondansetron (ZOFRAN ODT) 8 MG ODT tab Take 1 tablet (8 mg) by mouth every 8 hours as needed for nausea 20 tablet 0     " oxyCODONE (ROXICODONE) 5 MG tablet Take 1 tablet (5 mg) by mouth 2 times daily as needed for breakthrough pain Ok to fill 06/23/24 and begin using on 06/25/24. #30 days supplyfor chronic pain 60 tablet 0     pregabalin (LYRICA) 150 MG capsule Take 1 capsule (150 mg) by mouth 3 times daily 270 capsule 1     pyridostigmine (MESTINON) 60 MG tablet Take 1 tablet by mouth 3 times daily       ramipril (ALTACE) 10 MG capsule TAKE ONE CAPSULE BY MOUTH ONCE DAILY 90 capsule 0     riboflavin 100 MG CAPS Take 200 mg by mouth daily Two hundred milligrams nightly to prevent migraine       risperiDONE (RISPERDAL) 0.5 MG tablet Take 0.5 mg by mouth 2 times daily as needed       rosuvastatin (CRESTOR) 40 MG tablet TAKE ONE TABLET BY MOUTH ONCE DAILY 90 tablet 2     Semaglutide, 1 MG/DOSE, (OZEMPIC) 4 MG/3ML pen Inject 1 mg Subcutaneous once a week 3 mL 11     sodium chloride 0.9 % neb solution Take 3 mLs by nebulization every 3 hours as needed       sodium fluoride 1.1 % CREA At Bedtime       UBRELVY 100 MG tablet Take 100 mg by mouth at onset of headache       valACYclovir (VALTREX) 500 MG tablet Take 1 tablet (500 mg) by mouth daily 90 tablet 3     vitamin B complex with vitamin C (STRESS TAB) tablet Take 1 tablet by mouth daily       ZINC SULFATE-VITAMIN C MT Take 1 tablet by mouth daily       Current Facility-Administered Medications   Medication Dose Route Frequency Provider Last Rate Last Admin     2 mL lidocaine injection 2%  1 mL   Peng Perez DPM   1 mL at 09/22/23 1256     triamcinolone acetonide (KENALOG-10) injection 10 mg  10 mg   Peng Perez DPM   10 mg at 09/22/23 1256      Past Medical History:   Patient Active Problem List   Diagnosis     Intermittent asthma     Chronic nonallergic rhinitis     Diverticulosis     GERD (gastroesophageal reflux disease)     Generalized anxiety disorder     LLOYD (obstructive sleep apnea)- mild (AHI 11)     Intracranial arachnoid cyst     Facet arthritis of cervical region      Acquired hypothyroidism     Chronic midline low back pain without sciatica     Irritable bowel syndrome with diarrhea     B12 deficiency     Essential hypertension with goal blood pressure less than 140/90     Chronic, continuous use of opioids     Ankylosing spondylitis of sacral region (H)     Morbid obesity (H)     Fatty infiltration of liver     DDD (degenerative disc disease), lumbar     Type 2 diabetes mellitus with complication, without long-term current use of insulin (H)     Peripheral polyneuropathy     History of pulmonary embolism     Ingrown toenail     Hypertriglyceridemia     Gastroparesis     Orthostatic dizziness     POTS (postural orthostatic tachycardia syndrome)     PHN (postherpetic neuralgia)     Dysautonomia (H)     Chronic pain syndrome     Borderline personality disorder (H)     MDD (major depressive disorder), recurrent severe, without psychosis (H)     Folliculitis     Immunosuppression (H24)     Small fiber neuropathy     RBD (REM behavioral disorder)     Hyperlipidemia LDL goal <70     Anal fissure     Anorectal disorder     Benign neoplasm of ascending colon     Benign neoplasm of cecum     Altered bowel function     Digestive symptom     Dysphagia     Generalized abdominal pain     History of colitis     Internal hemorrhoids     Nausea     Perianal abscess     Hemorrhage of rectum and anus     Therapeutic opioid induced constipation     Past Medical History:   Diagnosis Date     Acne      Acquired hypothyroidism      Allergic state      Ankylosing spondylitis lumbar region (H)      Ankylosing spondylitis of sacral region (H)      Anxiety      Bipolar 2 disorder (H)      Chest pain     Chest pain, regulated w/BP meds. Clear arteries.     Chronic pain      DDD (degenerative disc disease), lumbar      Depressive disorder      Diabetes (H)      Diverticulosis      Facet arthritis of cervical region      Gastroesophageal reflux disease      Hypertension      IBS (irritable bowel  syndrome)      Intracranial arachnoid cyst      Major depressive disorder, recurrent episode (H24)     Multiple psych providers - they manage meds August 2015: Provigil induced severe mood dis-function      Major depressive disorder, recurrent episode, severe (H) 12/2/2020     MDD (major depressive disorder), recurrent severe, without psychosis (H) 7/21/2021     Mixed dyslipidemia 4/6/2023     LLOYD (obstructive sleep apnea)- mild (AHI 11)      Polyneuropathy      Pulmonary embolism (H)      Skin exam, screening for cancer 12/3/2013     Sleep apnea      Uncomplicated asthma         CC Janice Gonzales MD  [address] [place] [phone] on close of this encounter.      Again, thank you for allowing me to participate in the care of your patient.        Sincerely,        Janice Gonzales MD

## 2024-06-24 ENCOUNTER — OFFICE VISIT (OUTPATIENT)
Dept: ENDOCRINOLOGY | Facility: CLINIC | Age: 49
End: 2024-06-24
Payer: MEDICARE

## 2024-06-24 VITALS
WEIGHT: 284 LBS | OXYGEN SATURATION: 95 % | DIASTOLIC BLOOD PRESSURE: 78 MMHG | BODY MASS INDEX: 33.53 KG/M2 | HEIGHT: 77 IN | HEART RATE: 83 BPM | SYSTOLIC BLOOD PRESSURE: 113 MMHG | RESPIRATION RATE: 16 BRPM

## 2024-06-24 DIAGNOSIS — E11.8 TYPE 2 DIABETES MELLITUS WITH COMPLICATION, WITHOUT LONG-TERM CURRENT USE OF INSULIN (H): Primary | ICD-10-CM

## 2024-06-24 DIAGNOSIS — E03.9 ACQUIRED HYPOTHYROIDISM: ICD-10-CM

## 2024-06-24 LAB
HBA1C MFR BLD: 5.5 % (ref 0–5.6)
TSH SERPL DL<=0.005 MIU/L-ACNC: 3.36 UIU/ML (ref 0.3–4.2)

## 2024-06-24 PROCEDURE — G2211 COMPLEX E/M VISIT ADD ON: HCPCS | Performed by: INTERNAL MEDICINE

## 2024-06-24 PROCEDURE — 83036 HEMOGLOBIN GLYCOSYLATED A1C: CPT | Performed by: INTERNAL MEDICINE

## 2024-06-24 PROCEDURE — 84443 ASSAY THYROID STIM HORMONE: CPT | Performed by: INTERNAL MEDICINE

## 2024-06-24 PROCEDURE — 36415 COLL VENOUS BLD VENIPUNCTURE: CPT | Performed by: INTERNAL MEDICINE

## 2024-06-24 PROCEDURE — 99214 OFFICE O/P EST MOD 30 MIN: CPT | Performed by: INTERNAL MEDICINE

## 2024-06-24 NOTE — NURSING NOTE
"Joel Pineda's goals for this visit include:   Chief Complaint   Patient presents with    Follow Up     DMII low T       He requests these members of his care team be copied on today's visit information: yes    PCP: Ksenia Lyles    Referring Provider:  Referred Self, MD  No address on file    /78 (BP Location: Left arm, Patient Position: Sitting, Cuff Size: Adult Large)   Pulse 83   Resp 16   Ht 1.95 m (6' 4.77\")   Wt 128.8 kg (284 lb)   SpO2 95%   BMI 33.88 kg/m      Do you need any medication refills at today's visit? None    Shady Wheeler, EMT      "

## 2024-06-24 NOTE — LETTER
6/24/2024      Joel Pineda  [address] [place] [address]      Dear Colleague,    Thank you for referring your patient, Joel Pineda, to the [facility]. Please see a copy of my visit note below.    Outcome for 06/21/24 1:21 PM: Patient is not checking blood sugars  Radha Estevez, LUDA  Adult Endocrinology   [facility]            Endocrinology and Diabetes Clinic             Follow up for hypothyroidism, obesity, low testosterone and T2DM      Assessment:  Middle-aged man with morbid obesity, type 2 diabetes, hypothyroidism and co-morbidity of dyslipidemia POTS chronic pain syndrome on chronic pain medications, bipolar disorder, migraines    Obesity patient had been on Ozempic 1 mg weekly, he tolerates this well; he notes that he has some nausea which might be related to Ozempic or gastroparesis. He uses reglan. He has lost 35 pounds since summer 2022 and continues to lose weight.    Has started on Depakote.    Type 2 diabetes patient is doing very well on Ozempic.  A1c in the normal range, diabetes is in remission     Low testosterone testosterone has been low twice in 2022, patient since has been able to loose more than 10% of his body weight and Reglan which does seem to have caused hyperprolactinemia was decreaesd.  Clinically however patient still has symptoms of low testosterone including very low libido and erectile dysfunction.  Recheck of labs shows testosterone levels in nl rang       Hypothyroidism doing well on levothyroxine 75 mcg daily      Plan:   Continue levothyroxine 75 mcg daily  Continue Ozempic to 1 mg weekly; pt is on pt assistance program, which PCP is unable to provide       The Longitudinal plan of care for obesity, hypothyroidism, and T2DM was addressed during this visit. Due to added complexity of care, we will continue to support Joel , and the subsequent management of this condition(s) and with the ongoing continuity of  care of this condition.      Abbie Cuenca MD  Endocrinology and Diabetes  Telephone contact:  [facility] [place] [phone]  [facility] [place] [phone]        Interval history  Recheck of testosterone has  been in the normal rnage    Pt stopped Reglan due to elevated Prolactin in 9/23;    Has been on Ozempic 1 mg weekly, started treatment in 2022  Initial problems GI related, doing ok with gastroparesis,     Has been on LT4 75 mcg. Takes this      Testosterone:   Low libido decrease in sex drive, years  Erectile dysfunction no med  Has never been on testosterone before  Sleep: snoring CPAP,     Diabetes:  Has been in remission since weight loss on Ozempic  Prior medications  Intolerant to metformin due to GI side effects  Intolerance to sulfonylureas due to hypoglycemia        Current Problem List:   Patient Active Problem List   Diagnosis     Intermittent asthma     Chronic nonallergic rhinitis     Diverticulosis     GERD (gastroesophageal reflux disease)     Generalized anxiety disorder     LLOYD (obstructive sleep apnea)- mild (AHI 11)     Intracranial arachnoid cyst     Facet arthritis of cervical region     Acquired hypothyroidism     Chronic midline low back pain without sciatica     Irritable bowel syndrome with diarrhea     B12 deficiency     Essential hypertension with goal blood pressure less than 140/90     Chronic, continuous use of opioids     Ankylosing spondylitis of sacral region (H)     Morbid obesity (H)     Fatty infiltration of liver     DDD (degenerative disc disease), lumbar     Type 2 diabetes mellitus with complication, without long-term current use of insulin (H)     Peripheral polyneuropathy     History of pulmonary embolism     Ingrown toenail     Hypertriglyceridemia     Gastroparesis     Orthostatic dizziness     POTS (postural orthostatic tachycardia syndrome)     PHN (postherpetic neuralgia)     Dysautonomia (H)     Chronic pain syndrome      Borderline personality disorder (H)     MDD (major depressive disorder), recurrent severe, without psychosis (H)     Folliculitis     Immunosuppression (H24)     Small fiber neuropathy     RBD (REM behavioral disorder)     Hyperlipidemia LDL goal <70     Anal fissure     Anorectal disorder     Benign neoplasm of ascending colon     Benign neoplasm of cecum     Altered bowel function     Digestive symptom     Dysphagia     Generalized abdominal pain     History of colitis     Internal hemorrhoids     Nausea     Perianal abscess     Hemorrhage of rectum and anus     Therapeutic opioid induced constipation       Past Medical and Past Surgical History:  Past Medical History:   Diagnosis Date     Acne      Acquired hypothyroidism      Allergic state      Ankylosing spondylitis lumbar region (H)      Ankylosing spondylitis of sacral region (H)      Anxiety      Bipolar 2 disorder (H)      Chest pain     Chest pain, regulated w/BP meds. Clear arteries.     Chronic pain      DDD (degenerative disc disease), lumbar      Depressive disorder      Diabetes (H)      Diverticulosis      Facet arthritis of cervical region      Gastroesophageal reflux disease      Hypertension      IBS (irritable bowel syndrome)      Intracranial arachnoid cyst      Major depressive disorder, recurrent episode (H24)     Multiple psych providers - they manage meds August 2015: Provigil induced severe mood dis-function      Major depressive disorder, recurrent episode, severe (H) 12/2/2020     MDD (major depressive disorder), recurrent severe, without psychosis (H) 7/21/2021     Mixed dyslipidemia 4/6/2023     LLOYD (obstructive sleep apnea)- mild (AHI 11)      Polyneuropathy      Pulmonary embolism (H)      Skin exam, screening for cancer 12/3/2013     Sleep apnea      Uncomplicated asthma        Past Surgical History:   Procedure Laterality Date     BACK SURGERY  10/2007    lumbar discectomy L5-S1     BIOPSY  09/15/2020    Colon Adenomas x2      "COLONOSCOPY      Note: [facility], 9/4/15     COSMETIC SURGERY  2012    Nose Exterior - functional     GI SURGERY  08/2013    Sigmoidectomy     HERNIA REPAIR, UMBILICAL  08/23/2011    small, Dr. Evan Beavers     INCISION AND DRAINAGE, ABSCESS, COMPLEX  08/23/2011    umbilical, Dr. Evan Beavers     LAPAROSCOPIC ASSISTED COLECTOMY LEFT (DESCENDING)  08/15/2013    Procedure: LAPAROSCOPIC ASSISTED COLECTOMY LEFT (DESCENDING);  Laparoscopic Hand Assisted Sigmoid Resection, Mobilization of Splenic Fissure, coloproctoscopy, *Latex Free Room* Anesthesia General with Pain block  ;  Surgeon: Aurora Justice MD;  Location: [facility] OR     NERVE SURGERY  08/18/2011    RF ablation @ L3-S1 @ [facility]     RECONSTRUCT NOSE AND SEPTUM (FUNCTIONAL)  10/14/2011    Procedure:RECONSTRUCT NOSE AND SEPTUM (FUNCTIONAL); Functional Septorhinoplasty, Turbinate Reduction, ; Surgeon:CEDRIC CUEVAS; Location:[facility] OR     SINUS SURGERY  10/01/2001    ethmoidectomy chronic sinusitis     SOFT TISSUE SURGERY  4/7/2022    Hidradenitis Suppurativa surgery       Medications:       Current Outpatient Medications   Medication Sig Dispense Refill     acetaminophen 500 MG CAPS Take 500 mg by mouth 3 times daily       albuterol (PROAIR HFA/PROVENTIL HFA/VENTOLIN HFA) 108 (90 Base) MCG/ACT inhaler Inhale 2 puffs into the lungs every 6 hours as needed for shortness of breath, wheezing or cough 90 day supply 25.5 g 3     B-D LUER-LUCILLE SYRINGE 25G X 1\" 3 ML MISC USE WITH B12 INJECTIONS 12 each 0     bisacodyl (DULCOLAX) 5 MG EC tablet Take 10 mg by mouth as needed for constipation       buPROPion (WELLBUTRIN XL) 300 MG 24 hr tablet Take 1 tablet by mouth daily       cholecalciferol (D3-50) 1250 mcg (03548 units) capsule TAKE ONE CAPSULE BY MOUTH EVERY 2 WEEKS 24 capsule 0     clonazePAM (KLONOPIN) 0.5 MG tablet Take 1 mg by mouth At Bedtime For RBD       cyanocobalamin (CYANOCOBALAMIN) 1000 MCG/ML injection INJECT 1 ML INTO THE MUSCLE " "EVERY 30 DAYS 10 mL 0     diphenhydrAMINE (BENADRYL) 25 MG capsule Take 25 mg by mouth once a week Before Enbrel injection       divalproex sodium delayed-release (DEPAKOTE) 250 MG DR tablet Take 250 mg by mouth 2 times daily Migraine prevention       docusate sodium (COLACE) 50 MG capsule Take 100 mg by mouth 2 times daily       DULoxetine (CYMBALTA) 60 MG capsule Take 120 mg by mouth daily        EPINEPHrine (ADRENACLICK JR) 0.15 MG/0.15ML injection 2-pack Inject 0.15 mg into the muscle as needed for anaphylaxis May repeat one time in 5-15 minutes if response to initial dose is inadequate. This medication stays on the medication list as an \"as needed in case of emergency\" medication.       eszopiclone (LUNESTA) 3 MG tablet Take 3 mg by mouth At Bedtime        etanercept (ENBREL SURECLICK) 50 MG/ML autoinjector Inject 50 mg Subcutaneous once a week . Hold for signs of infection, and seek medical attention. 4 mL 7     famotidine (PEPCID) 20 MG tablet Take 20 mg by mouth daily       fenofibrate (TRICOR) 48 MG tablet TAKE ONE TABLET BY MOUTH ONCE DAILY 90 tablet 0     fexofenadine (ALLEGRA) 180 MG tablet Take 180 mg by mouth daily       fluticasone (FLONASE) 50 MCG/ACT nasal spray Spray 2 sprays in nostril daily       fluticasone-salmeterol (WIXELA INHUB) 100-50 MCG/ACT inhaler Inhale 1 puff into the lungs every 12 hours 3 each 3     guaiFENesin (MUCINEX) 600 MG 12 hr tablet Take 1 tablet (600 mg) by mouth daily       levothyroxine (SYNTHROID/LEVOTHROID) 75 MCG tablet TAKE ONE TABLET BY MOUTH EVERY MORNING 90 tablet 2     lidocaine (XYLOCAINE) 5 % external ointment Apply topically 2 times daily as needed for moderate pain 50 g 3     melatonin 3 MG tablet Take 13 mg by mouth nightly as needed        methocarbamol (ROBAXIN) 500 MG tablet TAKE 1 - 2 TABLETS BY MOUTH 4 TIMES DAILY AS NEEDED FOR MUSCLE SPASMS 240 tablet 1     metoclopramide (REGLAN) 10 MG tablet Take 10 mg by mouth 4 times daily (before meals and nightly) " "Takes half a tab for gastroperisis       metoprolol succinate ER (TOPROL XL) 100 MG 24 hr tablet TAKE 1 TABLET BY MOUTH IN THE EVENING; TO BE USED WITH 200MG IN MORNING 90 tablet 0     metoprolol succinate ER (TOPROL XL) 200 MG 24 hr tablet TAKE 1 TABLET BY MOUTH TWICE DAILY 180 tablet 0     montelukast (SINGULAIR) 10 MG tablet TAKE ONE TABLET BY MOUTH EVERY EVENING 90 tablet 0     nabumetone (RELAFEN) 500 MG tablet Take 1-2 tablets (500-1,000 mg) by mouth 2 times daily as needed for moderate pain 120 tablet 1     naloxone (NARCAN) 4 MG/0.1ML nasal spray Spray 4 mg into one nostril alternating nostrils as needed for opioid reversal every 2-3 minutes until assistance arrives. This medication is an antidote and stays on the chart as an \"as needed\" medication       olopatadine (PATADAY) 0.2 % ophthalmic solution Place 1 drop into both eyes daily 2.5 mL 3     omega-3 acid ethyl esters (LOVAZA) 1 g capsule TAKE TWO CAPSULES BY MOUTH TWICE DAILY 360 capsule 2     omeprazole (PRILOSEC) 20 MG DR capsule Take 20 mg by mouth as needed       OnabotulinumtoxinA (BOTOX IJ)        ondansetron (ZOFRAN ODT) 8 MG ODT tab Take 1 tablet (8 mg) by mouth every 8 hours as needed for nausea 20 tablet 0     oxyCODONE (ROXICODONE) 5 MG tablet Take 1 tablet (5 mg) by mouth 2 times daily as needed for breakthrough pain Ok to fill 06/23/24 and begin using on 06/25/24. #30 days supplyfor chronic pain 60 tablet 0     pregabalin (LYRICA) 150 MG capsule Take 1 capsule (150 mg) by mouth 3 times daily 270 capsule 1     pyridostigmine (MESTINON) 60 MG tablet Take 1 tablet by mouth 3 times daily       ramipril (ALTACE) 10 MG capsule TAKE ONE CAPSULE BY MOUTH ONCE DAILY 90 capsule 0     riboflavin 100 MG CAPS Take 200 mg by mouth daily Two hundred milligrams nightly to prevent migraine       risperiDONE (RISPERDAL) 0.5 MG tablet Take 0.5 mg by mouth 2 times daily as needed       rosuvastatin (CRESTOR) 40 MG tablet TAKE ONE TABLET BY MOUTH ONCE DAILY 90 " tablet 2     Semaglutide, 1 MG/DOSE, (OZEMPIC) 4 MG/3ML pen Inject 1 mg Subcutaneous once a week 3 mL 11     sodium chloride 0.9 % neb solution Take 3 mLs by nebulization every 3 hours as needed       sodium fluoride 1.1 % CREA At Bedtime       triamcinolone (KENALOG) 0.1 % external cream Apply twice daily for up to 2 weeks per month on hands. 80 g 3     UBRELVY 100 MG tablet Take 100 mg by mouth at onset of headache       valACYclovir (VALTREX) 500 MG tablet Take 1 tablet (500 mg) by mouth daily 90 tablet 3     vitamin B complex with vitamin C (STRESS TAB) tablet Take 1 tablet by mouth daily       ZINC SULFATE-VITAMIN C MT Take 1 tablet by mouth daily         Allergies:   Allergies   Allergen Reactions     Amoxicillin-Pot Clavulanate Difficulty breathing     Banana Shortness Of Breath     Pt reports organic Banana is okay.      Clavulanic Acid Anaphylaxis     Other Reaction(s): Throat swelling     Nitroglycerin Palpitations     Penicillins Anaphylaxis     Provigil [Modafinil] Shortness Of Breath     headache     Gadolinium Hives and Itching     Patient was premedicated for the contrast allergy. He did still have a reaction a few hours after injection. Hives and itching. Dr. Gomez told tech to inform pt he should only have contrast again in the future when premedicated and at a hospital. Not at an outpatient facility.      Haemophilus B Polysaccharide Vaccine Rash     Quadrivalent, cell based     Influenza Virus Vaccine Rash     Quadrivalent, cell based     Ketoconazole      Topical cream caused swelling and itching     Dye [Contrast Dye] Other (See Comments) and Hives     Moderate flushing, CT contrast  Iodinated and gadolinium     Formoterol      Gabapentin      Other reaction(s): hives     Golimumab      Hives, bradycardia, face swelling     Mometasone      Naproxen      Other reaction(s): Bleeding Gums     Neurontin [Gabapentin] Hives     Moderate hives     Nortriptyline Hives     Nystatin Hives      "Varicella Virus Vaccine Live      Rash     Adhesive Tape Rash     Baclofen Other (See Comments)     Cognitive changes     Flagyl [Metronidazole Hcl] Palpitations and Hives     Latex Rash     Metronidazole Palpitations, Other (See Comments) and Rash     dizziness (versus ciprofloxacin taken at same time)     Sulfamethizole Rash     Other Reaction(s): Rash       Social History     Tobacco Use     Smoking status: Never     Smokeless tobacco: Never   Substance Use Topics     Alcohol use: Not Currently     Comment: occ 1/week       Family History   Problem Relation Age of Onset     Musculoskeletal Disorder Mother         back     Anxiety Disorder Mother      Colon Polyps Mother      Ulcerative Colitis Mother         and ischemic small intestine, surgery     Hypertension Mother      Breast Cancer Mother      Osteoporosis Mother      Diabetes Mother         Type 2, Diagnosed in 2014     Depression Mother         Takes Cymbalta to help with chronic pain + depx     Thyroid Disease Mother         Hypothyroidism     Obesity Mother         Under much better control latter half of 2015     Musculoskeletal Disorder Father         back     Substance Abuse Father         Alcohol     Hypertension Father      Hyperlipidemia Father      Depression Father         Off meds for many years. Seems \"ok\"     Anxiety Disorder Father      Heart Disease Maternal Grandmother      Heart Disease Maternal Grandfather      Psychotic Disorder Paternal Grandfather      Suicide Paternal Grandfather      Depression Paternal Grandfather         Pediatrician. Committed suicide by pistol in 1990.     Musculoskeletal Disorder Brother         back     Depression Brother         Expressed as anger and moodiness     Substance Abuse Brother         Alcohol     Anxiety Disorder Brother      Substance Abuse Sister         Alcohol     Depression Sister         Mental Health Therapist, yet no anti-depressants?     Anxiety Disorder Sister         Mental Health " "Therapist, yet no anti-anxiety meds?     Other Cancer Other         Bladder     Substance Abuse Brother      Colon Cancer No family hx of      Crohn's Disease No family hx of      Anesthesia Reaction No family hx of      Cancer No family hx of         No family history of skin cancer       Physical Examination:  /78 (BP Location: Left arm, Patient Position: Sitting, Cuff Size: Adult Large)   Pulse 83   Resp 16   Ht 1.95 m (6' 4.77\")   Wt 128.8 kg (284 lb)   SpO2 95%   BMI 33.88 kg/m      Wt Readings from Last 4 Encounters:   06/10/24 128.4 kg (283 lb)   04/18/24 127.9 kg (282 lb)   04/08/24 127.9 kg (282 lb)   02/29/24 127.5 kg (281 lb)       Wt Readings from Last 4 Encounters:   06/10/24 128.4 kg (283 lb)   04/18/24 127.9 kg (282 lb)   04/08/24 127.9 kg (282 lb)   02/29/24 127.5 kg (281 lb)     10/19/22 : 140.6 kg (310 lb)  08/10/22 : 142.9 kg (315 lb)  07/11/22 : 143.8 kg (317 lb)  06/08/22 : 144.6 kg (318 lb 12.8 oz)      General: Well appearing tall obese man  in no distress,  Eyes: EOMI. Sclerae and conjunctivae are clear.        Labs and Studies:   Recent Labs   Lab Test 12/12/23  0823 02/15/23  0833 08/03/22  0947 02/10/22  0942 11/01/16  1546 05/10/16  0817   FSH 7.1  --  6.9  --   --   --    TSH 1.69 1.56  --  2.59   < >  --    [place]  --   --   --   --   --  9.4    < > = values in this interval not displayed.     Lab Results   Component Value Date    ALT 14 04/13/2024    AST 27 04/13/2024    BILITOTAL 0.4 04/13/2024    CR 1.10 04/13/2024     04/13/2024    TSH 1.69 12/12/2023    ALKPHOS 61 04/13/2024    TESTOSTTOTAL 299 12/12/2023    TESTOSTTOTAL 194 (L) 08/03/2022    TESTOSTTOTAL 203 (L) 07/25/2022    TESTOSTTOTAL 354 03/19/2013    SHBGT 23 12/12/2023    SHBGT 17 08/03/2022    SHBGT 15 07/25/2022    FT 7.14 12/12/2023    FT 5.12 08/03/2022    FT 5.64 07/25/2022    PSA 0.13 12/12/2023    PSA 0.13 12/12/2023    PSA 0.33 09/28/2015    TRIG 305 (H) 12/12/2023    TRIG 277 (H) 02/15/2023    TRIG " 367 (H) 04/21/2022    LDL 47 12/12/2023    HDL 33 (L) 12/12/2023    HDL 34 (L) 02/15/2023    HDL 38 (L) 04/21/2022    PROLACTIN 25 (H) 01/19/2012               Lab Results   Component Value Date     04/13/2024    CO2 22 04/13/2024    CHLORIDE 105 04/13/2024    CR 1.10 04/13/2024    TRIG 305 (H) 12/12/2023    HDL 33 (L) 12/12/2023    HGB 13.9 04/13/2024     Lab Results   Component Value Date    ALT 14 04/13/2024    AST 27 04/13/2024    BILITOTAL 0.4 04/13/2024    CR 1.10 04/13/2024     04/13/2024    TSH 1.69 12/12/2023    ALKPHOS 61 04/13/2024    TESTOSTTOTAL 299 12/12/2023    TESTOSTTOTAL 194 (L) 08/03/2022    TESTOSTTOTAL 203 (L) 07/25/2022    TESTOSTTOTAL 354 03/19/2013    SHBGT 23 12/12/2023    SHBGT 17 08/03/2022    SHBGT 15 07/25/2022    FT 7.14 12/12/2023    FT 5.12 08/03/2022    FT 5.64 07/25/2022    PSA 0.13 12/12/2023    PSA 0.13 12/12/2023    PSA 0.33 09/28/2015    TRIG 305 (H) 12/12/2023    TRIG 277 (H) 02/15/2023    TRIG 367 (H) 04/21/2022    LDL 47 12/12/2023    HDL 33 (L) 12/12/2023    HDL 34 (L) 02/15/2023    HDL 38 (L) 04/21/2022    PROLACTIN 25 (H) 01/19/2012     Lab Results   Component Value Date     04/13/2024    CHLORIDE 105 04/13/2024    CO2 22 04/13/2024    GLC 94 04/13/2024    CR 1.10 04/13/2024    CR 1.08 12/12/2023    CR 1.01 05/05/2023    CR 0.94 02/15/2023    CR 0.98 10/10/2022    ALYCE 9.9 04/13/2024    MAG 2.1 08/19/2013    ALBUMIN 4.7 04/13/2024    ALKPHOS 61 04/13/2024    LDL 47 12/12/2023    HDL 33 (L) 12/12/2023    TRIG 305 (H) 12/12/2023    TSH 1.69 12/12/2023    TSH 1.56 02/15/2023    TSH 2.59 02/10/2022     Lab Results   Component Value Date    MICROL 14.4 04/13/2024    MICROL 73.0 12/12/2023    MICROL 9 09/23/2022    MICROL 8 11/10/2021    MICROL 8 10/14/2020     Lab Results   Component Value Date    A1C 5.7 (H) 02/15/2023    A1C 6.4 (H) 09/23/2022    A1C 6.3 (H) 02/10/2022    A1C 6.0 (H) 08/19/2021    A1C 6.5 (H) 02/10/2021       Lab Results   Component Value  Date    HGB 13.9 04/13/2024           Again, thank you for allowing me to participate in the care of your patient.        Sincerely,        Abbie Cuenca MD

## 2024-06-24 NOTE — PATIENT INSTRUCTIONS
Welcome to the [facility]     Our Endocrinology Clinics are here to provide you with a team-based, collaborative approach in the diagnosis and treatment of patients with diabetes and endocrine disorders. The team is made up of Physicians, Physician Assistants, Certified Diabetes Educators, Registered Nurses, Medical Assistants, Emergency Medical Technicians, and many others, all of whom have the unified goal of providing our patients with high quality care.     Please see below for some helpful tips to best navigate and use the [facility]:     [place] Respect: At [facility], we are committed to a respectful and safe space for all patients, visitors, and staff.  We believe that mutual respect between patients and their care team is the foundation of quality care.  It is our expectation that you will be treated with respect by your care team.  In turn, we ask that all communication with the care team (written and verbal) be respectful and free from profanity, threatening, or abusive language.  Disrespectful communication undermines our therapeutic relationship with you and may result in us being unable to continue to provide your care.    Refills: A provider must see you at least annually to prescribe and refill medications. This is to ensure your safety as well as meet insurance and compliance regulations.    Scheduling: Many of our Providers offer both in-person or video visits. Please call to schedule any needed follow ups as soon as possible because our provider schedules fill up very quickly. Our care team has the right to require an in-person visit when they believe that it is medically necessary. Please remember that for any virtual visits, you must be in the [facility] at the time of the visit, otherwise we are unable to see you and you will need to be rescheduled.    Missed Appointments: If you need to cancel or miss your  scheduled appointment, please call the clinic at [phone] to reschedule.  Please note if you repeatedly miss appointments or repeatedly miss appointments without calling to inform us ahead of time (no-show), the clinic may elect to not allow you to reschedule without speaking to a manager, may require a Partnership In Care Agreement prior to rescheduling, or could result in you no longer being able to receive care from the clinic. Providing the clinic with timely notification if you have to miss an appointment, allows us to better serve the needs of all of our patients.    Primary Care Provider: Our Endocrinologists are Specialists in their field. We expect you to have a Primary Care Provider established to handle any needs outside of your diabetes and endocrine care.  We would be happy to assist you find a Primary Care Provider, if you do not have one.    [employer]: [employer] is a wonderful resource that allows you access to your Care Team via online or the james. Please ask a member of the team if you would like help creating an account. Please note that it may take up to 2 business days for a response. [employer] messages are not reviewed on weekends or after business hours.  Emergent or urgent care needs should never be communicated via [employer].  If you experience a medical emergency call 911 or go to the nearest emergency room.    Labs: It is recommended that you stay within the [facility] for labs but you are welcome to obtain ordered labs (with some exceptions) from any location of your choice as long as they are able to complete and process the needed labs. If you need us to fax orders to your preferred lab, please provide us the name and fax number of the lab you would like to go to so we can fax the orders. If your labs are drawn outside of the [facility], please have them fax the results to [phone] ([place]) or [phone] ([place]) or via [facility]. It is your  responsibility to ensure that outside lab results are sent to us.    We look forward to working with you. Please do not hesitate to reach out with any questions.    Thank you,    The Endocrine Team      Lab today and prior to next vist and as needed    Consider injecting Ozempic into your shoulder    Cont Levothyroxine 75 mcg daily          [facility] Address:   [place] Address:     [address] [place] [address]    Phone: [phone]  Fax: [phone] [address] [place] [address]    Phone: [phone]  Fax: [phone]     [facility] Cost Estimate Phone Number: [phone]    General Lab and Imaging Scheduling Phone Number: [phone]

## 2024-07-01 RX ORDER — CLINDAMYCIN PHOSPHATE 10 UG/ML
LOTION TOPICAL
Qty: 60 ML | Refills: 0 | Status: ON HOLD | OUTPATIENT
Start: 2024-07-01 | End: 2024-09-25

## 2024-07-01 NOTE — TELEPHONE ENCOUNTER
Clindamycin Phosphate External Lotion 1 %       Last Written Prescription Date:  med not on active med list  Last Fill Quantity: ,   # refills:   Last Office Visit : 6-21-24 ( [facility])  Future Office visit:  none    Routing refill request to provider for review/approval because:  Drug not active on patient's medication list

## 2024-07-08 ENCOUNTER — TRANSFERRED RECORDS (OUTPATIENT)
Dept: HEALTH INFORMATION MANAGEMENT | Facility: CLINIC | Age: 49
End: 2024-07-08
Payer: MEDICARE

## 2024-07-10 ENCOUNTER — VIRTUAL VISIT (OUTPATIENT)
Dept: PALLIATIVE MEDICINE | Facility: CLINIC | Age: 49
End: 2024-07-10
Payer: MEDICARE

## 2024-07-10 DIAGNOSIS — M51.369 DDD (DEGENERATIVE DISC DISEASE), LUMBAR: ICD-10-CM

## 2024-07-10 DIAGNOSIS — G62.9 PERIPHERAL POLYNEUROPATHY: ICD-10-CM

## 2024-07-10 DIAGNOSIS — M47.816 LUMBAR FACET ARTHROPATHY: ICD-10-CM

## 2024-07-10 DIAGNOSIS — M45.8 ANKYLOSING SPONDYLITIS OF SACRAL REGION (H): ICD-10-CM

## 2024-07-10 DIAGNOSIS — G89.29 CHRONIC INTRACTABLE PAIN: Primary | ICD-10-CM

## 2024-07-10 DIAGNOSIS — M47.817 LUMBOSACRAL SPONDYLOSIS WITHOUT MYELOPATHY: ICD-10-CM

## 2024-07-10 DIAGNOSIS — G43.111 INTRACTABLE MIGRAINE WITH AURA WITH STATUS MIGRAINOSUS: ICD-10-CM

## 2024-07-10 PROCEDURE — 96159 HLTH BHV IVNTJ INDIV EA ADDL: CPT | Mod: 95 | Performed by: PSYCHOLOGIST

## 2024-07-10 PROCEDURE — 96158 HLTH BHV IVNTJ INDIV 1ST 30: CPT | Mod: 95 | Performed by: PSYCHOLOGIST

## 2024-07-10 ASSESSMENT — SLEEP AND FATIGUE QUESTIONNAIRES
HOW LIKELY ARE YOU TO NOD OFF OR FALL ASLEEP WHILE SITTING AND READING: SLIGHT CHANCE OF DOZING
HOW LIKELY ARE YOU TO NOD OFF OR FALL ASLEEP IN A CAR, WHILE STOPPED FOR A FEW MINUTES IN TRAFFIC: WOULD NEVER DOZE
HOW LIKELY ARE YOU TO NOD OFF OR FALL ASLEEP WHILE LYING DOWN TO REST IN THE AFTERNOON WHEN CIRCUMSTANCES PERMIT: HIGH CHANCE OF DOZING
HOW LIKELY ARE YOU TO NOD OFF OR FALL ASLEEP WHILE SITTING INACTIVE IN A PUBLIC PLACE: WOULD NEVER DOZE
HOW LIKELY ARE YOU TO NOD OFF OR FALL ASLEEP WHILE SITTING QUIETLY AFTER LUNCH WITHOUT ALCOHOL: SLIGHT CHANCE OF DOZING
HOW LIKELY ARE YOU TO NOD OFF OR FALL ASLEEP WHEN YOU ARE A PASSENGER IN A CAR FOR AN HOUR WITHOUT A BREAK: SLIGHT CHANCE OF DOZING
HOW LIKELY ARE YOU TO NOD OFF OR FALL ASLEEP WHILE SITTING AND TALKING TO SOMEONE: WOULD NEVER DOZE
HOW LIKELY ARE YOU TO NOD OFF OR FALL ASLEEP WHILE WATCHING TV: SLIGHT CHANCE OF DOZING

## 2024-07-10 NOTE — PROGRESS NOTES
Joel Pineda is a 49 year old male who is being evaluated via a billable video visit.      Patient is currently in the [facility]? yes    Patient would like the video invitation sent by: Text to cell phone: [phone]}    Video Start Time: 2:00 PM  Video Stop Time: 2:55 PM    Additional provider notes:     Pain Diagnoses per pain provider:   Chronic intractable pain     DDD (degenerative disc disease), lumbar     Lumbosacral spondylosis without myelopathy     Lumbar facet arthropathy     Intractable migraine with aura with status migrainosus     Ankylosing spondylitis of sacral region (H)     Peripheral polyneuropathy            DATA: During today's visit you reported the following: Your chronic pain is managed by current regimen. Had two day burst of steroid after dental procedure, which helped with pain overall. Your mood continues to be high - described some agoraphobia, panic attacks - using breathing has been helpful. Your activity level is reduced - would like to increase this. Your stress level is manageable over-all. Your sleep is still somewhat disrupted by cats. You reported engaging in self-care for your pain 2-3+ times daily.    You identified that you would like to focus on the following or had questions regarding the following issues or concerns, and we discussed the following:   - still noting benefit with vocal cord injections  - researching implant surgery - hoping to proceed if insurance approves, creating list of questions for ENT surgeon  - father will be in town mid-August until October - may be able to provide transportation support  - father said he would see you twice while he is in town, disappointed by this but not surprised  - sister happened to be in town for birthday  - mother diagnosed with white matter disease - discussed ongoing grief and loss associated with dementia   - discussed ways to slowly increase physical activity - use timer and body scan after 5 minutes and  add if able  - had repeat botox for migraines, helpful  - upcoming MRI  - discussed that provider's role within [facility] is changing, effective 10/9/2024 and how this impacts work together - you express interest in continuing on in a mental health capacity    ASSESSMENT: Tito reports pain is more manageable due to recent steroid burst after dental procedure. We discussed possibility of continuing with provider in mental health capacity after transition to  psychologist role and patient expresses definitive interest in doing so - discussed treatment would likely continue to involve pain management, however would be primarily focused on his mental health concerns, which he seemed interested in.     PLAN:   Your next appointment is scheduled for 7/23 at 2:00 PM.  Assignment/Objectives /interventions for next session:   - schedule walk 1-3 times per day for 5 minutes or whatever your tolerance is to start; at end of a week explore factors that facilitated or prevented engagement and problem solve as needed      We believe regular attendance is key to your success in our program!    Any time you are unable to keep your appointment we ask that you call us at [phone] at least 24 hours in advance to cancel.This will allow us to offer the appointment time to another patient.   Multiple missed appointments may lead to dismissal from the clinic.    Telemedicine Visit: The patient's condition can be safely assessed and treated via synchronous audio and visual telemedicine encounter.      Reason for Telemedicine Visit: Services only offered telehealth    Originating Site (Patient Location): Patient's home    Distant Site (Provider Location): Provider Remote Setting- Home Office    Consent:  The patient/guardian has verbally consented to: the potential risks and benefits of telemedicine (video visit) versus in person care; bill my insurance or make self-payment for services provided; and responsibility for  payment of non-covered services.     Mode of Communication:  Video Conference via [employer]    As the provider I attest to compliance with applicable laws and regulations related to telemedicine.      Shirley Bartlett PsyD LP  Licensed Psychologist  Outpatient Clinic Therapist  [facility]

## 2024-07-14 DIAGNOSIS — E55.9 VITAMIN D DEFICIENCY: ICD-10-CM

## 2024-07-15 ENCOUNTER — OFFICE VISIT (OUTPATIENT)
Dept: SLEEP MEDICINE | Facility: CLINIC | Age: 49
End: 2024-07-15
Payer: MEDICARE

## 2024-07-15 VITALS
DIASTOLIC BLOOD PRESSURE: 78 MMHG | BODY MASS INDEX: 35.19 KG/M2 | WEIGHT: 289 LBS | HEART RATE: 93 BPM | SYSTOLIC BLOOD PRESSURE: 111 MMHG | OXYGEN SATURATION: 93 % | HEIGHT: 76 IN

## 2024-07-15 DIAGNOSIS — G47.52 RBD (REM BEHAVIORAL DISORDER): ICD-10-CM

## 2024-07-15 DIAGNOSIS — G47.33 OSA (OBSTRUCTIVE SLEEP APNEA): Primary | ICD-10-CM

## 2024-07-15 PROCEDURE — 99214 OFFICE O/P EST MOD 30 MIN: CPT | Performed by: PSYCHIATRY & NEUROLOGY

## 2024-07-15 RX ORDER — CHOLECALCIFEROL (VITAMIN D3) 1250 MCG
CAPSULE ORAL
Qty: 24 CAPSULE | Refills: 0 | Status: SHIPPED | OUTPATIENT
Start: 2024-07-15

## 2024-07-15 NOTE — PROGRESS NOTES
Follow up for RBD and LLOYD     LLOYD: Continues to do well on CPAP.  Uses every night for approximately 7-8 hours, AHI is < 1 and has minimal leak.  He feels more refreshed using it and indicates he will continue to do so. Order placed for new CPAP supplies.      RBD: Still on clonazepam 1mg and melatonin 13mg.  Doing well minimal dream enactment and describes no significant side effects except he does have middle of the night insomnia which I suspect could be an advance in his circadian rhythm which can happen with melatonin.  Because of this I gave him permission to take his melatonin as close to bedtime as possible or to drop the dose to 10mg and if he does not have worsening dream enactment he can mychart me and we can drop the dose even further.      He is insightful regarding relationship between RBD and PD and related conditions.  He has numerous autonomic symptoms and recently some voice changes.  He is following up with Dr Hall as well as with ENT.  He did have  skin biopsy that should + synuclein in one of the biopsy sites.  Dr Hall has been informed of these findings and can follow him for multiple system atrophy/pure autonomic failure/POTS if appropriate.      Was previously in [employer] but has been too stressed to continue projects for now.  Which we understand.       All questions were answered.     It is a great privilege being asked to participate in this patients care.  The patient has been advised on the importance in of never operating operating a motor vehicle while tired or sleepy.         I visited with the patient directly but also extensively reviewed chart and coordinated care. Total time spent in the care of this patient on 7-15-24 was 35 minutes.

## 2024-07-15 NOTE — NURSING NOTE
"Chief Complaint   Patient presents with    Sleep Problem     RBD follow up, recent surgery on vocal chords       Initial /78   Pulse 93   Ht 1.93 m (6' 4\")   Wt 131.1 kg (289 lb)   SpO2 93%   BMI 35.18 kg/m   Estimated body mass index is 35.18 kg/m  as calculated from the following:    Height as of this encounter: 1.93 m (6' 4\").    Weight as of this encounter: 131.1 kg (289 lb).    Medication Reconciliation: complete  ESS 7  Jing Cunningham MA   "

## 2024-07-16 ENCOUNTER — E-VISIT (OUTPATIENT)
Dept: FAMILY MEDICINE | Facility: CLINIC | Age: 49
End: 2024-07-16
Payer: MEDICARE

## 2024-07-16 DIAGNOSIS — Z91.018 FOOD ALLERGY: ICD-10-CM

## 2024-07-16 PROCEDURE — 99207 PR NO BILLABLE SERVICE THIS VISIT: CPT | Performed by: FAMILY MEDICINE

## 2024-07-16 RX ORDER — EPINEPHRINE 0.3 MG/.3ML
0.3 INJECTION SUBCUTANEOUS PRN
Qty: 2 EACH | Refills: 3 | Status: SHIPPED | OUTPATIENT
Start: 2024-07-16

## 2024-07-17 ENCOUNTER — ANCILLARY PROCEDURE (OUTPATIENT)
Dept: GENERAL RADIOLOGY | Facility: CLINIC | Age: 49
End: 2024-07-17
Attending: NURSE PRACTITIONER
Payer: MEDICARE

## 2024-07-17 ENCOUNTER — ANCILLARY PROCEDURE (OUTPATIENT)
Dept: MRI IMAGING | Facility: CLINIC | Age: 49
End: 2024-07-17
Attending: NURSE PRACTITIONER
Payer: MEDICARE

## 2024-07-17 DIAGNOSIS — M45.8 ANKYLOSING SPONDYLITIS OF SACRAL REGION (H): ICD-10-CM

## 2024-07-17 DIAGNOSIS — G89.29 CHRONIC INTRACTABLE PAIN: ICD-10-CM

## 2024-07-17 DIAGNOSIS — M15.0 PRIMARY OSTEOARTHRITIS INVOLVING MULTIPLE JOINTS: ICD-10-CM

## 2024-07-17 DIAGNOSIS — M53.3 SI (SACROILIAC) JOINT DYSFUNCTION: ICD-10-CM

## 2024-07-17 DIAGNOSIS — M47.816 LUMBAR FACET ARTHROPATHY: ICD-10-CM

## 2024-07-17 DIAGNOSIS — M51.369 DDD (DEGENERATIVE DISC DISEASE), LUMBAR: ICD-10-CM

## 2024-07-17 PROCEDURE — 72200 X-RAY EXAM SI JOINTS: CPT | Performed by: RADIOLOGY

## 2024-07-17 PROCEDURE — 72220 X-RAY EXAM SACRUM TAILBONE: CPT | Performed by: RADIOLOGY

## 2024-07-17 PROCEDURE — G1010 CDSM STANSON: HCPCS | Performed by: RADIOLOGY

## 2024-07-17 PROCEDURE — 72148 MRI LUMBAR SPINE W/O DYE: CPT | Mod: MF | Performed by: RADIOLOGY

## 2024-07-17 NOTE — TELEPHONE ENCOUNTER
"Ciarra Knight, NELSY    " Provider E-Visit time total (minutes): 7

## 2024-07-18 ASSESSMENT — ANXIETY QUESTIONNAIRES
8. IF YOU CHECKED OFF ANY PROBLEMS, HOW DIFFICULT HAVE THESE MADE IT FOR YOU TO DO YOUR WORK, TAKE CARE OF THINGS AT HOME, OR GET ALONG WITH OTHER PEOPLE?: VERY DIFFICULT
GAD7 TOTAL SCORE: 11
2. NOT BEING ABLE TO STOP OR CONTROL WORRYING: MORE THAN HALF THE DAYS
7. FEELING AFRAID AS IF SOMETHING AWFUL MIGHT HAPPEN: MORE THAN HALF THE DAYS
3. WORRYING TOO MUCH ABOUT DIFFERENT THINGS: MORE THAN HALF THE DAYS
6. BECOMING EASILY ANNOYED OR IRRITABLE: SEVERAL DAYS
7. FEELING AFRAID AS IF SOMETHING AWFUL MIGHT HAPPEN: MORE THAN HALF THE DAYS
5. BEING SO RESTLESS THAT IT IS HARD TO SIT STILL: SEVERAL DAYS
1. FEELING NERVOUS, ANXIOUS, OR ON EDGE: MORE THAN HALF THE DAYS
4. TROUBLE RELAXING: SEVERAL DAYS
GAD7 TOTAL SCORE: 11
IF YOU CHECKED OFF ANY PROBLEMS ON THIS QUESTIONNAIRE, HOW DIFFICULT HAVE THESE PROBLEMS MADE IT FOR YOU TO DO YOUR WORK, TAKE CARE OF THINGS AT HOME, OR GET ALONG WITH OTHER PEOPLE: VERY DIFFICULT

## 2024-07-20 ENCOUNTER — MYC REFILL (OUTPATIENT)
Dept: PALLIATIVE MEDICINE | Facility: OTHER | Age: 49
End: 2024-07-20
Payer: MEDICARE

## 2024-07-20 DIAGNOSIS — M51.369 DDD (DEGENERATIVE DISC DISEASE), LUMBAR: ICD-10-CM

## 2024-07-20 DIAGNOSIS — M47.816 LUMBAR FACET ARTHROPATHY: ICD-10-CM

## 2024-07-20 DIAGNOSIS — M45.8 ANKYLOSING SPONDYLITIS OF SACRAL REGION (H): ICD-10-CM

## 2024-07-20 DIAGNOSIS — G43.111 INTRACTABLE MIGRAINE WITH AURA WITH STATUS MIGRAINOSUS: ICD-10-CM

## 2024-07-20 DIAGNOSIS — G62.9 PERIPHERAL POLYNEUROPATHY: ICD-10-CM

## 2024-07-20 DIAGNOSIS — G89.29 CHRONIC INTRACTABLE PAIN: ICD-10-CM

## 2024-07-22 RX ORDER — OXYCODONE HYDROCHLORIDE 5 MG/1
5 TABLET ORAL 2 TIMES DAILY PRN
Qty: 60 TABLET | Refills: 0 | Status: SHIPPED | OUTPATIENT
Start: 2024-07-22 | End: 2024-08-16

## 2024-07-22 NOTE — TELEPHONE ENCOUNTER
Received request for a refill(s) of oxyCODONE (ROXICODONE) 5 MG tablet      Last dispensed from pharmacy on 06/24/24    Patient's last office/virtual visit by prescribing provider on 06/17/24  Next office/virtual appointment scheduled for 07/25/24    Last urine drug screen date 04/08/24  Current opioid agreement on file (completed within the last year) Yes Date of opioid agreement: 04/08/24    E-prescribe to pharmacy-[id number] - [place], [place] - [address] [address]     Will route to nursing [place] for review and preparation of prescription(s).

## 2024-07-22 NOTE — TELEPHONE ENCOUNTER
Medication refill information reviewed.     Due date for oxyCODONE (ROXICODONE) 5 MG tablet is 07/25/24     Prescriptions prepped for review.     Will route to provider.

## 2024-07-22 NOTE — TELEPHONE ENCOUNTER
ken [facility] Nurse2 days ago       Refills have been requested for the following medications:         oxyCODONE (ROXICODONE) 5 MG tablet [Stacia Jack]     Preferred pharmacy: [id number] [place], [place] - [address] [address]

## 2024-07-23 ENCOUNTER — VIRTUAL VISIT (OUTPATIENT)
Dept: PALLIATIVE MEDICINE | Facility: CLINIC | Age: 49
End: 2024-07-23
Payer: MEDICARE

## 2024-07-23 DIAGNOSIS — M47.817 LUMBOSACRAL SPONDYLOSIS WITHOUT MYELOPATHY: ICD-10-CM

## 2024-07-23 DIAGNOSIS — M47.816 LUMBAR FACET ARTHROPATHY: ICD-10-CM

## 2024-07-23 DIAGNOSIS — G43.111 INTRACTABLE MIGRAINE WITH AURA WITH STATUS MIGRAINOSUS: ICD-10-CM

## 2024-07-23 DIAGNOSIS — G62.9 PERIPHERAL POLYNEUROPATHY: ICD-10-CM

## 2024-07-23 DIAGNOSIS — M45.8 ANKYLOSING SPONDYLITIS OF SACRAL REGION (H): ICD-10-CM

## 2024-07-23 DIAGNOSIS — M51.369 DDD (DEGENERATIVE DISC DISEASE), LUMBAR: ICD-10-CM

## 2024-07-23 DIAGNOSIS — G89.29 CHRONIC INTRACTABLE PAIN: Primary | ICD-10-CM

## 2024-07-23 PROCEDURE — 96158 HLTH BHV IVNTJ INDIV 1ST 30: CPT | Mod: 95 | Performed by: PSYCHOLOGIST

## 2024-07-23 PROCEDURE — 96159 HLTH BHV IVNTJ INDIV EA ADDL: CPT | Mod: 95 | Performed by: PSYCHOLOGIST

## 2024-07-23 NOTE — PROGRESS NOTES
Joel Pineda is a 49 year old male who is being evaluated via a billable video visit.      Patient is currently in the [facility]? yes    Patient would like the video invitation sent by: Text to cell phone: [phone]}    Video Start Time: 2:00 PM  Video Stop Time: 2:57 PM     Additional provider notes:     Pain Diagnoses per pain provider:   Chronic intractable pain    DDD (degenerative disc disease), lumbar    Lumbosacral spondylosis without myelopathy    Lumbar facet arthropathy    Intractable migraine with aura with status migrainosus    Ankylosing spondylitis of sacral region (H)    Peripheral polyneuropathy        DATA: During today's visit you reported the following: Your back pain is worse. Migraines have been stable. Your mood is about the same - pleased you haven't had to use Risperdal as often. Your activity level is the same. Your stress level is about the same. Your sleep is mildly improvement since change in Melatonin dose. You reported engaging in self-care for your pain 2-3 times daily.    You identified that you would like to focus on the following or had questions regarding the following issues or concerns, and we discussed the following:   - update on MRI  - reduced dose of Melatonin to 10mg per sleep medicine  - report increased painful sensations in legs - report it feels like bee stings  - shared about allergic response to allergy eye drops  - upcoming open Board meeting  - some worry thoughts about back related to recent MRI    ASSESSMENT: Tito reports back pain has worsened since steroid burst has worn off, some worry thoughts about this as well as what he has read on recent MRI. Discussed focusing on present and 'what can I do today' instead of excessive worry about future of which we have no control.     PLAN:   Your next appointment is scheduled for 8/6 at 2:00 PM.  Assignment/Objectives /interventions for next session:   - continue to focus on present moment  - start to  think of possible goals for when we transition to IT    We believe regular attendance is key to your success in our program!    Any time you are unable to keep your appointment we ask that you call us at [phone] at least 24 hours in advance to cancel.This will allow us to offer the appointment time to another patient.   Multiple missed appointments may lead to dismissal from the clinic.    Telemedicine Visit: The patient's condition can be safely assessed and treated via synchronous audio and visual telemedicine encounter.      Reason for Telemedicine Visit: Services only offered telehealth    Originating Site (Patient Location): Patient's home    Distant Site (Provider Location): Provider Remote Setting- Home Office    Consent:  The patient/guardian has verbally consented to: the potential risks and benefits of telemedicine (video visit) versus in person care; bill my insurance or make self-payment for services provided; and responsibility for payment of non-covered services.     Mode of Communication:  Video Conference via [employer]    As the provider I attest to compliance with applicable laws and regulations related to telemedicine.      Shirley Bartlett PsyD LP  Licensed Psychologist  Outpatient Clinic Therapist  [facility]

## 2024-07-25 ENCOUNTER — OFFICE VISIT (OUTPATIENT)
Dept: PALLIATIVE MEDICINE | Facility: OTHER | Age: 49
End: 2024-07-25
Attending: NURSE PRACTITIONER
Payer: MEDICARE

## 2024-07-25 VITALS
BODY MASS INDEX: 34.93 KG/M2 | HEART RATE: 83 BPM | WEIGHT: 287 LBS | OXYGEN SATURATION: 96 % | DIASTOLIC BLOOD PRESSURE: 55 MMHG | SYSTOLIC BLOOD PRESSURE: 111 MMHG

## 2024-07-25 DIAGNOSIS — G89.29 CHRONIC INTRACTABLE PAIN: Primary | ICD-10-CM

## 2024-07-25 DIAGNOSIS — G62.9 PERIPHERAL POLYNEUROPATHY: ICD-10-CM

## 2024-07-25 DIAGNOSIS — M47.817 LUMBOSACRAL SPONDYLOSIS WITHOUT MYELOPATHY: ICD-10-CM

## 2024-07-25 DIAGNOSIS — G57.13 MERALGIA PARESTHETICA OF BOTH LOWER EXTREMITIES: ICD-10-CM

## 2024-07-25 DIAGNOSIS — M79.7 FIBROMYALGIA: ICD-10-CM

## 2024-07-25 DIAGNOSIS — G43.111 INTRACTABLE MIGRAINE WITH AURA WITH STATUS MIGRAINOSUS: ICD-10-CM

## 2024-07-25 PROCEDURE — G0463 HOSPITAL OUTPT CLINIC VISIT: HCPCS | Performed by: NURSE PRACTITIONER

## 2024-07-25 PROCEDURE — 99214 OFFICE O/P EST MOD 30 MIN: CPT | Performed by: NURSE PRACTITIONER

## 2024-07-25 ASSESSMENT — PAIN SCALES - GENERAL: PAINLEVEL: MODERATE PAIN (5)

## 2024-07-25 NOTE — PROGRESS NOTES
"[facility]    CHIEF COMPLAINT: Chronic Pain.    INTERVAL HISTORY:  Last seen on 6/17/24.       Recommendations/plan at the last visit included:  Health Psychology: YES, Continue per your psychologist's recommendations.  30 minute Clinic follow-up with JOCELYN Zavala NP-C in 1 week after MRI   Imaging: MRI of Lumbar Spine: [facility]  [phone], [facility] - [phone]  Medication Management : Continue current medications.     Since last visit:   - Continuing to see Shirley Bartlett in Pain Psychology  - Had \"worst pain since 2007\" last week, felt like a hot poker on & off. No precipitating event, came out of nowhere. Pain was direct on the spine. \"Cat-Cow\" yoga pose helped. Has \"bee stings\" feeling on bilateral calves, cold sensations on bilateral thighs.   - Botox 31 injections, in head, neck and traps for muscle spasms & migraines. Did not help traps orneck pain     Pain Information today: Moderate Pain (5)/10. Location of pain: lumbar pain.    Annual Requirements last collected:  4/8/24     Current Pain Relevant Medications:    Acetaminophen 500 mg BID & with Oxycodone.   Cymbalta 120 mg in AM  Lyrica 300 MG BID   Methocarbamol 500 mg 1-2 QID PRN  Nabumetone 500 mg 1-2 times a day  Lidocaine gel topically 2-3 times daily  Risperdal 0.5 mg 1-2 x daily      Pain Related Controlled Substances:   Clonazepam 0.5 mg, 2 tabs at HS.  Lunesta 3 mg  Oxycodone 5 mg 1-2 tabs per day PRN              Total opiate dose: 7.5-15 MME     Previous Pain Relevant Medications: (H--helped; HI--Helped initially; SWH--Somewhat helpful; NH--No help; W--worse; SE--side effects; ?--Unsure if helpful)   NOTE: This medication information taken from patient's intake form, not medical records.   Opiates: Oxycodone: H, Tramadol:SWH. SE, Codeine: NH  NSAIDS: Celebrex: SW, Nabumetone:H, Naproxen: SE  Anti-migraine medications: Midrin? , Maxalt:H  Muscle Relaxants: Baclofen:SWH, Flexeril:H, " Tizaidine:?, Methocarbamol:?  Neuropathics: Lyrica:H  Anti-depressants: Abilify:SE, Brintellix: NH, Wellbutrin: ?, Cymbalta: NH, Nortriptyline: NH, Seroquel:   [facility], Risperdal: NH, Effexor:?, Cymbalta ?  Anxiety medications: Xanax: H, Klonpin: H, lorazepam: H      Topicals: Lidocaine:H  Sleep medications:Belsomra: NH, Melatonin:H, Trazodone NH, Ambien:NH  Other medications not covered above: Humira: All, Enbrel; H, dicyclomine:H, Medrol:SWH, Prednisone:H     Any illicit drug use: denies   EtOH use: none   Caffeine use: 6-8 per day   Nicotine use: None     Past Pain Treatments:   Pain Clinic:   Yes  [facility] pain management: For spinal injections with Dr Diaz, [facility]: injections, nerve ablation, [facility] for SCS treatment.  Did trial but did not find it beneficial.   [facility] chronic pain program.    PT: Yes  For other reasons. Neck, back and feet  Psychologist: Yes ongoing with private therapist.at  [facility].   Chiropractor: Yes years ag              Acupuncture: Yes NH  Pharmacotherapy:  Opioids: Yes  Non-opioids:    Yes   TENs Unit:Yes H for back   Injections: Yes Many for neck and back over the years.      Surgeries related to pain: Yes   October 2007 L5-S1 laminectomy, micro discectomy          THE 4 As OF OPIOID MAINTENANCE ANALGESIA    Analgesia: Is pain relief clinically significant? YES   Activity: Is patient functional and able to perform Activities of Daily Living? YES   Adverse effects: Is patient free from adverse side effects from opiates? YES   Adherence to Rx protocol: Is patient adhering to Controlled Substance Agreement and taking medications ONLY as ordered? YES     Is Narcan prescribed for opiate use >50 MME daily or concurrent use of opiates and benzodiazepines?  Yes    [place] Board of Pharmacy Data Base Reviewed:    YES; No concern for abuse or misuse of controlled medications based on this report. Reviewed [facility] July 24, 2024- no concerning fills.      PHYSICAL EXAM    Vitals:     07/25/24 1519   BP: 111/55   Pulse: 83   SpO2: 96%   Weight: 130.2 kg (287 lb)       Constitutional: healthy, alert, and mild distress A&O.   Patient is appropriate.  Psychiatric/mental status: Alert, without lethargy or stupor. Appropriate affect.       DIRE Score for ongoing opioid management is calculated as follows:    Diagnosis = 3 pts (advanced condition; severe pain/objective findings)    Intractability = 3 pts (patient fully engaged but inadequate response to treatments)    Risk        Psych = 3 pts (no significant personality dysfunction/mental illness; good communication with clinic)         Chem Hlth = 3 pts (no history of chemical dependency; not drug-focused)       Reliability = 2 pts (occasional difficulties with compliance; generally reliable)       Social = 2 pts (reduction in some relationships/life rolls)       (Psych + Chem hlth + Reliability + Social) = 16    Efficacy = 2 pts (moderate benefit/function; low med dose; too early/not tried meds)    DIRE Score = 18        7-13: likely NOT suitable candidate for long-term opioid analgesia       14-21: may be a suitable candidate for long-term opioid analgesia     Previous Diagnostic Tests:   Imaging Studies:     7/17/2024: MR LUMBAR SPINE W/O CONTRAST   IMPRESSION: Interval progression of multilevel lumbar spondylosis, most pronounced at L4-L5 and L5-S1, when compared to 6/27/2019. There is moderate to severe left neuroforaminal stenosis at L4-L5 with  abutment of the exiting left L4 nerve. Additional moderate to severe  bilateral neuroforaminal stenosis with abutment of the exiting  bilateral L5 nerves at L5-S1 and abutment of the descending left S1  nerve root.        PAIN RELEVANT CONDITIONS:   1.  Ankylosing spondylosis, Lumbar DDD with left S1 nerve involvement, lumbar stenosis  2.  Chronic migraine headaches  3.  Chronic cervical pain, DDD  4.  Peripheral small fiber neuropathy    DIAGNOSIS AND PLAN:     (G89.29) Chronic intractable pain   (primary encounter diagnosis)  (M79.7) Fibromyalgia  (G43.111) Intractable migraine with aura with status migrainosus  (M47.817) Lumbosacral spondylosis without myelopathy  (G57.13) Meralgia paresthetica of both lower extremities  (G62.9) Peripheral polyneuropathy  Comment: continue current POC   Plan: As noted.          PATIENT INSTRUCTIONS:     Diagnosis reviewed, treatment option addressed, and risk/benefits discussed.  Self-care instructions given.  I am recommending a multidisciplinary treatment plan to help this patient better manage pain.    Remember to request ALL medication refills 5 BUSINESS days before you run out.     Health Psychology: YES  Continue per Shirley's recommendations  Physical therapy: Continue Yoga, home exercise program  30 minutes Video or Clinic follow-up with JOCELYN Zavala NP-C in 2 months or sooner . Ok to schedule up to three follow up appts at a time, alternating clinic and video visits.   Procedures recommended: Stacia will look into order TPIs with steroids  Medication Management :   Continue current medications.     I have reviewed the note as documented above.  This accurately captures the substance of my conversation with the patient.  A total of 24 minutes of preparation, care, and consultation were spent on this visit today.     JOCELYN Padgett NP-C  [facility]    (Information in italics and blue color are taken from previous pain and consulting medical providers notes and are documented as such)

## 2024-07-25 NOTE — PATIENT INSTRUCTIONS
After Visit Instructions:     Thank you for coming to [facility] for your care. It is my goal to partner with you to help you reach your optimal state of health.   Continue daily self-care, identifying contributing factors, and monitoring variations in pain level. Continue to integrate self-care into your life.        Health Psychology: YES  Continue per Shirley's recommendations  Physical therapy: Continue Yoga, home exercise program  30 minutes Video or Clinic follow-up with JOCELYN Zavala NP-C in 2 months or sooner . Ok to schedule up to three follow up appts at a time, alternating clinic and video visits.   Procedures recommended: Stacia will look into order TPIs with steroids  Medication Management :   Continue current medications.       JOCELYN Padgett NP-C  [facility]  [facility] - Monday, Thursday and Friday  [facility] [place] [facility] - Tuesday      Be sure to request ALL medication refills 5 days prior to the due date whether or not you will see your medical provider in an appointment before the due date.      Do not expect same day refills. If you do not plan ahead you may run out of medications.     Early refills are not provided.  It is your responsibility to manage your medications responsibility and keep them safely stored. Lost or destroyed medications WILL NOT be replaced    Scheduling/Clinic telephone Number for ALL locations:  [phone]    After Hours On-Call Service:  [phone]    Call with any questions about your care and for scheduling assistance.   Calls are returned Monday through Friday between 8 AM and 4:00 PM. We usually get back to you within 2 business days depending on the issue/request.    If we are prescribing your medications:  For opioid medication refills, call the clinic or send a ???? message 7 days in advance.  Please include:  Your name and date of birth.   Name of requested medication  Name of the pharmacy.  For  non-opioid medications, call your pharmacy directly to request a refill. Please allow 3-4 days to be processed.   Per [place] State Law:  All controlled substance prescriptions must be filled within 30 days of being written.    For those controlled substances allowing refills, pickup must occur within 30 days of last fill.      We believe regular attendance is key to your success in our program!    Any time you are unable to keep your appointment we ask that you call us at least 24 hours in advance to cancel.This will allow us to offer the appointment time to another patient.   Multiple missed appointments may lead to dismissal from the clinic.

## 2024-07-25 NOTE — PROGRESS NOTES
Patient presents to the clinic today for a visit  with JOCELYN Ron CNP            4/8/2024     2:10 PM 4/23/2024     8:32 AM 7/25/2024     3:20 PM   PEG Score   PEG Total Score 5.67 5.67 7.33       UDS/CSA-4/8/24    Medications-oxycodone 5 mg     QUESTIONS:    Sneha Nuñez MA  [facility] [place]

## 2024-07-27 DIAGNOSIS — I10 ESSENTIAL HYPERTENSION WITH GOAL BLOOD PRESSURE LESS THAN 140/90: ICD-10-CM

## 2024-07-27 DIAGNOSIS — E78.1 HYPERTRIGLYCERIDEMIA: ICD-10-CM

## 2024-07-29 RX ORDER — FENOFIBRATE 48 MG/1
48 TABLET, COATED ORAL DAILY
Qty: 90 TABLET | Refills: 2 | Status: SHIPPED | OUTPATIENT
Start: 2024-07-29

## 2024-07-29 RX ORDER — METOPROLOL SUCCINATE 100 MG/1
TABLET, EXTENDED RELEASE ORAL
Qty: 90 TABLET | Refills: 2 | Status: SHIPPED | OUTPATIENT
Start: 2024-07-29

## 2024-08-01 ASSESSMENT — ANXIETY QUESTIONNAIRES
5. BEING SO RESTLESS THAT IT IS HARD TO SIT STILL: NOT AT ALL
4. TROUBLE RELAXING: SEVERAL DAYS
7. FEELING AFRAID AS IF SOMETHING AWFUL MIGHT HAPPEN: SEVERAL DAYS
8. IF YOU CHECKED OFF ANY PROBLEMS, HOW DIFFICULT HAVE THESE MADE IT FOR YOU TO DO YOUR WORK, TAKE CARE OF THINGS AT HOME, OR GET ALONG WITH OTHER PEOPLE?: VERY DIFFICULT
1. FEELING NERVOUS, ANXIOUS, OR ON EDGE: SEVERAL DAYS
GAD7 TOTAL SCORE: 7
6. BECOMING EASILY ANNOYED OR IRRITABLE: SEVERAL DAYS
IF YOU CHECKED OFF ANY PROBLEMS ON THIS QUESTIONNAIRE, HOW DIFFICULT HAVE THESE PROBLEMS MADE IT FOR YOU TO DO YOUR WORK, TAKE CARE OF THINGS AT HOME, OR GET ALONG WITH OTHER PEOPLE: VERY DIFFICULT
2. NOT BEING ABLE TO STOP OR CONTROL WORRYING: SEVERAL DAYS
7. FEELING AFRAID AS IF SOMETHING AWFUL MIGHT HAPPEN: SEVERAL DAYS
GAD7 TOTAL SCORE: 7
3. WORRYING TOO MUCH ABOUT DIFFERENT THINGS: MORE THAN HALF THE DAYS

## 2024-08-04 ENCOUNTER — MYC MEDICAL ADVICE (OUTPATIENT)
Dept: PALLIATIVE MEDICINE | Facility: OTHER | Age: 49
End: 2024-08-04
Payer: MEDICARE

## 2024-08-04 DIAGNOSIS — G57.13 MERALGIA PARESTHETICA OF BOTH LOWER EXTREMITIES: Primary | ICD-10-CM

## 2024-08-05 NOTE — TELEPHONE ENCOUNTER
05/06/24  Procedure: bilateral Lateral femoral cutaneous nerve block     Pended to repeat.    Routing to provider for review.     JAVON GarciaN, RN  Care Coordinator  [facility] [place]

## 2024-08-05 NOTE — PROGRESS NOTES
Medication Therapy Management (MTM) Encounter    ASSESSMENT:                            Medication Adherence/Access: No issues reported    Mood/Insomnia: Stable. Follows closely with psychiatry.    Migraine:  Stable    Diabetes/Weight Loss:   Stable    Pain:  Stable. Discussed role of acetaminophen with oxycodone and that he does not have to take with the oxycodone and can take only as needed.      PLAN:                            No medication changes recommended today.    Follow-up: 3 months    SUBJECTIVE/OBJECTIVE:                          Joel Pineda is a 49 year old male called for a follow up visit. He was referred to me from Ksenia Lyles. Follow up from 6/11/2024.    Reason for visit: Medication Review    Allergies/ADRs: Reviewed in chart  Past Medical History: Reviewed in chart  Tobacco: He reports that he has never smoked. He has never used smokeless tobacco.  Alcohol: none  Caffeine: 4-5 cups of coffee/day  Activity: None    Medication Adherence/Access: no issues reported  Patient uses pill box(es).  Patient qualifies for [employer] program for Enbrel (delivers to his house) and Ozempic (ships to [facility] endocr[facility]ology [facility])    Mood/Insomnia:  Duloxetine 120mg daily  Bupropion XL 300mg daily  Risperidone 0.5mg twice daily as needed-taking four times a week. Higher doses of risperdone are overly sedating  Clonazepam 1mg at bedtime for RBD-has been using 0.5mg during the day along with risperidone and 1mg at night. Risperdone alone has not been effective.  Lunesta 3mg at bedtime  Melatonin 10mg nightly as needed-hasn't had any issues since lowering his dose    Mood is so-so. Has made some slight progress with spending more time out doors. Is on the [employer] board and this has been more stressful.  Follows  with Dr. CHENEY at [facility]. Therapy through pain management.  Sleep has been ok. Has been buying things off of [employer] and [employer] liquidation site. Has talked with his therapist about this.      Migraine:   Preventive medications  Botox  Depakote 125 twice daily      Acute medications  Ubrelvy    Dose of deptakote was decreased from 250 twice daily to 125mg twice daily because of weight gain. Migraines are still controlled.    Pain:  Oxycodone 5mg can take up to two times daily as needed for pain-takes about 10mg a day, gets 60 tablets  per month  Pregabalin 150mg three times daily-300mg in the morning and 150mg at night. Gets more brain fog and fatigue with higher doses.  Lidocaine 5% ointment twice daily as needed  Methocarbamol 500-1000mg four time daily(usually 1 three time daily)  Nabumetone 500-1000mg twice daily as needed -taking daily-is aware that these can trigger his migraines  Acetaminophen 500mg three times ara-has been taking along with oxycodone because he thought he needed to take the acetaminophen with it to make it more effective.-aware acetaminophen use can trigger migraines  Lidocaine 4% patch uses as needed    Has been working with Pain Management (provider, therapist)  in [place] and feel this has been helpful. Getting trigger point injections, dry needling, physical therapy, TENs unit. These have been helpful along with the medications    Diabetes/Obesity:   Ozempic 1mg weekly.  Has been tolerating ok with his gastroparesis.  SMBG: rarely.    Recent symptoms of low blood sugar? Does set his alarm to remind him to eat.   Recent symptoms of high blood sugar? none  Eye exam: up to date  Foot exam: up to date  ACEi/ARB: Yes: Ramipril.   Aspirin: Yes    Endocrinology would like him to switch to Mounjaro but they don't have a patient assistance program. Patient not interested in increasing ozempic dose    Today's Vitals: There were no vitals taken for this visit.  ----------------    I spent 20 minutes with this patient today. All changes were made via collaborative practice agreement with Ksenia Lyles. A copy of the visit note was provided to the patient's  provider(s).    A summary of these recommendations was sent via [employer].    Radha Mcdonald, Pharm.D, BCACP  Medication Therapy Management Pharmacist    Telemedicine Visit Details  Type of service:  Telephone visit  Start Time: 11:04AM  End Time: 11:24AM     Medication Therapy Recommendations  No medication therapy recommendations to display

## 2024-08-06 ENCOUNTER — VIRTUAL VISIT (OUTPATIENT)
Dept: PHARMACY | Facility: CLINIC | Age: 49
End: 2024-08-06
Payer: COMMERCIAL

## 2024-08-06 ENCOUNTER — VIRTUAL VISIT (OUTPATIENT)
Dept: PALLIATIVE MEDICINE | Facility: CLINIC | Age: 49
End: 2024-08-06
Payer: MEDICARE

## 2024-08-06 DIAGNOSIS — G89.29 CHRONIC INTRACTABLE PAIN: Primary | ICD-10-CM

## 2024-08-06 DIAGNOSIS — G43.111 INTRACTABLE MIGRAINE WITH AURA WITH STATUS MIGRAINOSUS: ICD-10-CM

## 2024-08-06 DIAGNOSIS — G47.00 INSOMNIA, UNSPECIFIED TYPE: ICD-10-CM

## 2024-08-06 DIAGNOSIS — G57.13 MERALGIA PARESTHETICA OF BOTH LOWER EXTREMITIES: ICD-10-CM

## 2024-08-06 DIAGNOSIS — M79.7 FIBROMYALGIA: ICD-10-CM

## 2024-08-06 DIAGNOSIS — G62.9 PERIPHERAL POLYNEUROPATHY: ICD-10-CM

## 2024-08-06 DIAGNOSIS — E11.9 TYPE 2 DIABETES MELLITUS WITHOUT COMPLICATION, WITHOUT LONG-TERM CURRENT USE OF INSULIN (H): ICD-10-CM

## 2024-08-06 DIAGNOSIS — G43.819 OTHER MIGRAINE WITHOUT STATUS MIGRAINOSUS, INTRACTABLE: ICD-10-CM

## 2024-08-06 DIAGNOSIS — G89.4 CHRONIC PAIN SYNDROME: ICD-10-CM

## 2024-08-06 DIAGNOSIS — F41.8 DEPRESSION WITH ANXIETY: Primary | ICD-10-CM

## 2024-08-06 DIAGNOSIS — M47.817 LUMBOSACRAL SPONDYLOSIS WITHOUT MYELOPATHY: ICD-10-CM

## 2024-08-06 DIAGNOSIS — R63.4 WEIGHT LOSS: ICD-10-CM

## 2024-08-06 PROCEDURE — 96158 HLTH BHV IVNTJ INDIV 1ST 30: CPT | Mod: 95 | Performed by: PSYCHOLOGIST

## 2024-08-06 PROCEDURE — 96159 HLTH BHV IVNTJ INDIV EA ADDL: CPT | Mod: 95 | Performed by: PSYCHOLOGIST

## 2024-08-06 PROCEDURE — 99207 PR NO CHARGE LOS: CPT | Mod: 93 | Performed by: PHARMACIST

## 2024-08-06 RX ORDER — CLONAZEPAM 0.5 MG/1
0.5 TABLET ORAL DAILY PRN
COMMUNITY

## 2024-08-06 RX ORDER — PHENOL 1.4 %
10 AEROSOL, SPRAY (ML) MUCOUS MEMBRANE AT BEDTIME
COMMUNITY

## 2024-08-06 RX ORDER — DIVALPROEX SODIUM 125 MG/1
125 TABLET, DELAYED RELEASE ORAL EVERY EVENING
COMMUNITY

## 2024-08-06 NOTE — PATIENT INSTRUCTIONS
"Recommendations from today's MTM visit:                                                         No medication changes recommended today.    Follow-up: 3 months    It was great speaking with you today.  I value your experience and would be very thankful for your time in providing feedback in our clinic survey. In the next few days, you may receive an email or text message from [facility] [employer] with a link to a survey related to your  clinical pharmacist.\"     To schedule another MTM appointment, please call the clinic directly or you may call the MTM scheduling line at [phone] or toll-free at [phone].     My Clinical Pharmacist's contact information:                                                      Please feel free to contact me with any questions or concerns you have.      Radha Mcdonald, Pharm.D, [facility]  Medication Therapy Management Pharmacist      "

## 2024-08-06 NOTE — PROGRESS NOTES
Joel Pineda is a 49 year old male who is being evaluated via a billable video visit.      Patient is currently in the [facility]? yes    Patient would like the video invitation sent by: Text to cell phone: [phone]}    Video Start Time: 2:00 PM  Video Stop Time: 2:55 PM    Additional provider notes:     Pain Diagnoses per pain provider:   Chronic intractable pain    Fibromyalgia    Intractable migraine with aura with status migrainosus    Lumbosacral spondylosis without myelopathy    Meralgia paresthetica of both lower extremities    Peripheral polyneuropathy        DATA: During today's visit you reported the following: Your chronic pain is 'ok' - managing between medications and at home self-cares, 'same stuff different day'. Your mood is back to baseline - very anxious regarding new management for HSA. Your activity level is 'ok' - some worries related to safety walking in parts of your neighborhood especially in the [facility] where several homeless people who tend to hang out due to nearby homeless shelter. Your stress level is higher than you'd like - navigating relationship with new property management team. Your sleep is unchanged. You reported engaging in self-care for your pain 2-3 times daily.    You identified that you would like to focus on the following or had questions regarding the following issues or concerns, and we discussed the following:   - started dry needling, feeling hopeful so far  - using more lidocaine patches to help thigh pain - hoping to have repeat bilateral nerve blocks  - mother celebrated her 79th birthday this past weekend, has grand plans for her 80th to take either river boat cruise or train ride in [place] area  - father and his wife driving up later this month from [place]  - 'honeymoon' with new property management is over - discussed some strategies to increase communication  - report some worry about some of your thoughts that felt paranoid - encouraged to  discuss with psychiatric provider and recent reduction in Depakote dose around the time of these thoughts    ASSESSMENT: Tito reports pain is unchanged. He reports his mood is feeling more anxious, he has had to resume more regular use of some of his PRNs to manage this, and reports worry about some thoughts he describes as feeling more paranoid. He was encouraged to discuss with his psychiatrist, as well as keep using skills to engage in reality checking and track if these thoughts recur.    PLAN:   Your next appointment is scheduled for 8/22 at 2:00 PM.  Assignment/Objectives /interventions for next session:   - continue to focus on self-care  - keep psychiatry appointment and share recent symptoms and situational factors with provider as discussed    We believe regular attendance is key to your success in our program!    Any time you are unable to keep your appointment we ask that you call us at [phone] at least 24 hours in advance to cancel.This will allow us to offer the appointment time to another patient.   Multiple missed appointments may lead to dismissal from the clinic.    Telemedicine Visit: The patient's condition can be safely assessed and treated via synchronous audio and visual telemedicine encounter.      Reason for Telemedicine Visit: Services only offered telehealth    Originating Site (Patient Location): Patient's home    Distant Site (Provider Location): Provider Remote Setting- Home Office    Consent:  The patient/guardian has verbally consented to: the potential risks and benefits of telemedicine (video visit) versus in person care; bill my insurance or make self-payment for services provided; and responsibility for payment of non-covered services.     Mode of Communication:  Video Conference via [employer]    As the provider I attest to compliance with applicable laws and regulations related to telemedicine.      Shirley Bartlett PsyD LP  Licensed Psychologist  Outpatient Clinic  Therapist  [facility]

## 2024-08-07 ENCOUNTER — MYC MEDICAL ADVICE (OUTPATIENT)
Dept: PALLIATIVE MEDICINE | Facility: OTHER | Age: 49
End: 2024-08-07
Payer: MEDICARE

## 2024-08-07 DIAGNOSIS — M45.8 ANKYLOSING SPONDYLITIS OF SACRAL REGION (H): Primary | ICD-10-CM

## 2024-08-07 DIAGNOSIS — M51.369 DDD (DEGENERATIVE DISC DISEASE), LUMBAR: ICD-10-CM

## 2024-08-07 DIAGNOSIS — M47.16 LUMBAR SPONDYLOSIS WITH MYELOPATHY: ICD-10-CM

## 2024-08-07 DIAGNOSIS — M54.59 LUMBAR FACET JOINT PAIN: ICD-10-CM

## 2024-08-10 DIAGNOSIS — E78.1 HYPERTRIGLYCERIDEMIA: ICD-10-CM

## 2024-08-10 DIAGNOSIS — E78.5 HYPERLIPIDEMIA LDL GOAL <70: ICD-10-CM

## 2024-08-12 RX ORDER — OMEGA-3-ACID ETHYL ESTERS 1 G/1
CAPSULE, LIQUID FILLED ORAL
Qty: 360 CAPSULE | Refills: 0 | Status: SHIPPED | OUTPATIENT
Start: 2024-08-12

## 2024-08-13 NOTE — TELEPHONE ENCOUNTER
SPINE PATIENTS - NEW PROTOCOL PREVISIT    RECORDS RECEIVED FROM: referred by Stacia Jack to Dr. Biggs    REASON FOR VISIT: Ankylosing spondylitis of sacral region; Lumbar spondylosis with myelopathy; DDD (degenerative disc disease), lumbar; Lumbar facet joint pain   PROVIDER: DAVID   DATE OF APPT: 08/20/2024   NOTES (FOR ALL VISITS) STATUS DETAILS   OFFICE NOTE from referring provider Internal Referral and notes in chart   OFFICE NOTE from other specialist N/A    DISCHARGE SUMMARY from hospital N/A    DISCHARGE REPORT from ER N/A    OPERATIVE REPORT Care Everywhere Prev LAMINECTOMY LUMBAR 10/19/2007 w/ Joel Mcdermott MD   EMG REPORT Internal EMG 11/02/2021   Injection Internal RFA 12/01/2023   Physical therapy Internal PT of low back last completed 07/20/2023   IMAGING  (FOR ALL VISITS)     MRI (HEAD, NECK, SPINE) Internal MRI Lumbar 07/17/2024   XRAY (SPINE) *NEUROSURGERY* Internal XR lumbar 4 view 04/23/2024   CT (HEAD, NECK, SPINE) N/A

## 2024-08-14 ENCOUNTER — DOCUMENTATION ONLY (OUTPATIENT)
Dept: ENDOCRINOLOGY | Facility: CLINIC | Age: 49
End: 2024-08-14
Payer: MEDICARE

## 2024-08-14 NOTE — PROGRESS NOTES
Type of form:  Patient Assistance Refill Form   From:  Katie Nordisk   Supplies requested on form:  Ozempic medication   Provider: Abbie Cuenca MD  Date provider will be in clinic to sign:  8/26/2024  Form completed to the best of writers ability    Labeled and placed in provider's file to address when in this office    Radha Estevez CMA  Adult Endocrinology   [facility]

## 2024-08-16 ENCOUNTER — MYC REFILL (OUTPATIENT)
Dept: PALLIATIVE MEDICINE | Facility: OTHER | Age: 49
End: 2024-08-16
Payer: MEDICARE

## 2024-08-16 DIAGNOSIS — G89.29 CHRONIC INTRACTABLE PAIN: ICD-10-CM

## 2024-08-16 DIAGNOSIS — M51.369 DDD (DEGENERATIVE DISC DISEASE), LUMBAR: ICD-10-CM

## 2024-08-16 DIAGNOSIS — M47.816 LUMBAR FACET ARTHROPATHY: ICD-10-CM

## 2024-08-16 DIAGNOSIS — G43.111 INTRACTABLE MIGRAINE WITH AURA WITH STATUS MIGRAINOSUS: ICD-10-CM

## 2024-08-16 DIAGNOSIS — G62.9 PERIPHERAL POLYNEUROPATHY: ICD-10-CM

## 2024-08-16 DIAGNOSIS — M45.8 ANKYLOSING SPONDYLITIS OF SACRAL REGION (H): ICD-10-CM

## 2024-08-16 RX ORDER — OXYCODONE HYDROCHLORIDE 5 MG/1
5 TABLET ORAL 2 TIMES DAILY PRN
Qty: 60 TABLET | Refills: 0 | Status: SHIPPED | OUTPATIENT
Start: 2024-08-16 | End: 2024-09-15

## 2024-08-16 NOTE — TELEPHONE ENCOUNTER
Pending Prescriptions:                       Disp   Refills    oxyCODONE (ROXICODONE) 5 MG tablet        60 tab*0            Sig: Take 1 tablet (5 mg) by mouth 2 times daily as           needed for breakthrough pain Ok to fill 08/22/24           and begin using on 08/24/24. #30 days supplyfor           chronic pain    [id number] - [place], [place] - [address]

## 2024-08-16 NOTE — TELEPHONE ENCOUNTER
Received call from patient requesting refill(s) oxyCODONE (ROXICODONE) 5 MG tablet     Last dispensed from pharmacy on 07/25/2024    Patient's last office/virtual visit by prescribing provider on 07/25/2024  Next office/virtual appointment scheduled for 09/27/2024    Last urine drug screen date 04/08/2024  Current opioid agreement on file (completed within the last year) Yes Date of opioid agreement: 04/09/2024    E-prescribe to   [id number] - [place] - [address], pharmacy    Will route to nursing [place] for review and preparation of prescription(s).     Grisel Redd MA  [facility] [place] [facility] [place]

## 2024-08-16 NOTE — TELEPHONE ENCOUNTER
Script Eprescribed to pharmacy    Signed Prescriptions:                        Disp   Refills    oxyCODONE (ROXICODONE) 5 MG tablet         60 tab*0        Sig: Take 1 tablet (5 mg) by mouth 2 times daily as needed           for breakthrough pain Ok to fill 08/22/24 and           begin using on 08/24/24. #30 days supplyfor           chronic pain  Authorizing Provider: DONATO NUGENT      Thank you for using My Chart to request your medication refills!     JOCELYN Padgett, NP-C  [facility] [place]

## 2024-08-16 NOTE — TELEPHONE ENCOUNTER
Refills have been requested for the following medications:         oxyCODONE (ROXICODONE) 5 MG tablet [Stacia Jack]      Patient Comment: 12 tablets remain      Preferred pharmacy: [id number] [place], [place] - [address]

## 2024-08-17 DIAGNOSIS — J45.30 MILD PERSISTENT ASTHMA: ICD-10-CM

## 2024-08-17 ASSESSMENT — PAIN SCALES - PAIN ENJOYMENT GENERAL ACTIVITY SCALE (PEG)
INTERFERED_GENERAL_ACTIVITY: 4
PEG_TOTALSCORE: 4
INTERFERED_ENJOYMENT_LIFE: 4
INTERFERED_ENJOYMENT_LIFE: 4
INTERFERED_GENERAL_ACTIVITY: 4
AVG_PAIN_PASTWEEK: 4
AVG_PAIN_PASTWEEK: 4
PEG_TOTALSCORE: 4

## 2024-08-19 ENCOUNTER — OFFICE VISIT (OUTPATIENT)
Dept: PALLIATIVE MEDICINE | Facility: CLINIC | Age: 49
End: 2024-08-19
Attending: NURSE PRACTITIONER
Payer: MEDICARE

## 2024-08-19 VITALS — DIASTOLIC BLOOD PRESSURE: 79 MMHG | HEART RATE: 87 BPM | SYSTOLIC BLOOD PRESSURE: 116 MMHG

## 2024-08-19 DIAGNOSIS — G57.13 MERALGIA PARESTHETICA OF BOTH LOWER EXTREMITIES: Primary | ICD-10-CM

## 2024-08-19 PROCEDURE — 64450 NJX AA&/STRD OTHER PN/BRANCH: CPT | Mod: 50 | Performed by: PAIN MEDICINE

## 2024-08-19 RX ORDER — BUPIVACAINE HYDROCHLORIDE 2.5 MG/ML
10 INJECTION, SOLUTION EPIDURAL; INFILTRATION; INTRACAUDAL ONCE
Status: COMPLETED | OUTPATIENT
Start: 2024-08-19 | End: 2024-08-19

## 2024-08-19 RX ORDER — MONTELUKAST SODIUM 10 MG/1
TABLET ORAL
Qty: 90 TABLET | Refills: 0 | Status: SHIPPED | OUTPATIENT
Start: 2024-08-19

## 2024-08-19 RX ADMIN — BUPIVACAINE HYDROCHLORIDE 25 MG: 2.5 INJECTION, SOLUTION EPIDURAL; INFILTRATION; INTRACAUDAL at 12:26

## 2024-08-19 ASSESSMENT — PAIN SCALES - GENERAL: PAINLEVEL: MODERATE PAIN (4)

## 2024-08-19 NOTE — PATIENT INSTRUCTIONS
----------------------------------------------------------------  [facility] Number:  [phone]   Call with any questions about your care and for scheduling assistance.   Calls are returned Monday through Friday between 8 AM and 4:30 PM. We usually get back to you within 2 business days depending on the issue/request.    If we are prescribing your medications:  For opioid medication refills, call the clinic or send a Fii[employer] message 7 days in advance.  Please include:  Name of requested medication  Name of the pharmacy.  For non-opioid medications, call your pharmacy directly to request a refill. Please allow 3-4 days to be processed.   Per [place] State Law:  All controlled substance prescriptions must be filled within 30 days of being written.    For those controlled substances allowing refills, pickup must occur within 30 days of last fill.      We believe regular attendance is key to your success in our program!    Any time you are unable to keep your appointment we ask that you call us at least 24 hours in advance to cancel.This will allow us to offer the appointment time to another patient.   Multiple missed appointments may lead to dismissal from the clinic.

## 2024-08-19 NOTE — TELEPHONE ENCOUNTER
montelukast (SINGULAIR) 10 MG tablet 90 tablet 0 5/20/2024 -- No   Sig: TAKE ONE TABLET BY MOUTH EVERY EVENING           Last Office Visit: 6/10/24  Future Office visit:  6/11/25  Next 5 appointments (look out 90 days)      Nov 05, 2024 11:00 AM  Pharmacist Visit with Radha Mcdonald RPH  [facility] ([facility] ) [address] [place] [address] [phone] [phone]             Routing refill request to provider for review/approval because:  Did not pass protocol.

## 2024-08-19 NOTE — PROGRESS NOTES
Diagnoses and all orders for this visit:       Bilateral Meralgia paresthetica       Current Procedure: bilateral Lateral femoral cutaneous nerve block  Current Indication (include preoperative):  Alleviation of pain      REASON FOR REFERRAL: Anterior lateral thigh pain and burning  Sonographic guidance will be used to ensure accurate placement.  PATIENT EDUCATION:  Ready to learn with no apparent learning barriers identified.  Learning preferences include listening. Explained diagnosis and treatment plan as well as treatment alternatives. Patient expressed understanding of the content.  Following denial of allergy and review of potential side effects and complications including but not necessarily limited to infection, bleeding, allergic reaction, post-injection flare, local tissue breakdown, injury to soft tissue and/or nerves and seizure, patient indicated their understanding and agreed to proceed. A written consent was obtained and is scanned into the chart. Written and signed consent obtained and is scanned into the chart.  PROCEDURE:  Prior to the procedure,a 12 MHz linear transducer was used to visualize the abdominal/groin musculature to determine the approach for the procedure.  Procedure was carried out using sterile technique including Chloraprep scrub, a sterile transducer cover, and sterile transducer gel. A simple surgical tray was used.  PROCEDURAL PAUSE:  Procedural pause conducted to verify correct patient identity, procedure to be performed, and as applicable, correct side/site, correct patient position, availability of implants, special equipment, or special requirements.  Patient position:  Supine  Transducer type:  12 MHz linear array transducer  Approach:  Medial to lateral parallel to long axis of transducer  Local Anesthesia:  25 gauge 2.5 inch needle was used to anesthetize the skin, subcutaneous tissue  with 5 ml of 1% Lidocaine  Injection: After confirming needle tip position, syringe was  replaced with one containing 1 ml of 0.25% Bupivacaine which was injected and seen hydodissecting the sartorius and tensor fascia florida bilaterally.  Needle was removed bandage placed over the wound.  AFTERCARE:  Patient tolerated the procedure without complication. After a short observation period, the patient was discharged under their own power and in excellent condition.  Pain noted to be a 8/10 before completion of the procedure and 0/10 after completion of the procedure.      Injection solution contained:  2ml of 0.25% bupivacaine              Bubba Montgomery MD  [facility] [place]

## 2024-08-20 ENCOUNTER — PRE VISIT (OUTPATIENT)
Dept: NEUROSURGERY | Facility: CLINIC | Age: 49
End: 2024-08-20

## 2024-08-20 ENCOUNTER — OFFICE VISIT (OUTPATIENT)
Dept: NEUROSURGERY | Facility: CLINIC | Age: 49
End: 2024-08-20
Attending: NURSE PRACTITIONER
Payer: MEDICARE

## 2024-08-20 VITALS
BODY MASS INDEX: 34.95 KG/M2 | HEIGHT: 76 IN | DIASTOLIC BLOOD PRESSURE: 74 MMHG | OXYGEN SATURATION: 98 % | WEIGHT: 287 LBS | SYSTOLIC BLOOD PRESSURE: 122 MMHG | HEART RATE: 82 BPM

## 2024-08-20 DIAGNOSIS — M54.16 LUMBAR RADICULOPATHY: Primary | ICD-10-CM

## 2024-08-20 PROCEDURE — 99214 OFFICE O/P EST MOD 30 MIN: CPT | Performed by: SURGERY

## 2024-08-20 ASSESSMENT — PAIN SCALES - GENERAL: PAINLEVEL: MODERATE PAIN (4)

## 2024-08-20 NOTE — PATIENT INSTRUCTIONS
Bilateral lumbar 4-lumbar 5 hemilaminectomies, medial facetectomies and foraminotomies with possible lumbar microdiscectomy and lumbar 5-sacral 1 foraminotomies . Risks and benefits of surgery discussed including but not limited to infection, hematoma, nerve damage including paralysis, post op radiculitis, durotomy, inadequate symptom relief, recurrent lumbar disease, risks associated with the use of general anesthesia.         Please review COMPLETE information about your proposed surgery, pre-operative requirements, post-operative course and expectations - available in a folder provided to you in clinic!    Your surgery scheduler will call you within 3 business days to begin the process of scheduling your surgery and appointments.     Pre-Operative    Pre-operative physical / Lab work with primary care physician within 30 days of surgical date. We prefer the pre-op exam to be done 2 weeks prior to surgery. We also prefer pre-op lab work be done at [facility] outpatient lab, 2 weeks prior to surgery.     If all pre-op appointments/test are not completed prior to your surgery date, you will be asked to reschedule your surgery.           As part of preparation for your upcoming procedure your primary care doctor may order a test to rule out a COVID-19 infection. This is no longer a requirement prior to surgery.     Readiness Visits    Prior to surgery, you may have a telephone or in person readiness visit with our RN team to discuss your upcomming surgery, results of your pre-op physical, and lab work.   If you will require a collar/neck brace after surgery, you will be fitted for one at your readiness visit prior to surgery (scheduled by the surgery scheduler).     Shower procedure    Hibiclens shower: Use one packet the night before surgery and one packet the morning of surgery for a whole body shower. Avoid face, hair, and genitals.      Eating/Drinking    Stop all solid foods 8 hours before  surgery.  Stop all clear liquids 2 hours prior to arrival time     Clear liquids include water, clear juice, black coffee, or clear tea without milk, Gatorade, clear soda.     Medications - please refer to the pre-operative medication instructions sheet in your folder    Hold Aspirin, NSAIDs (Advil/Ibuprofen, Indocin, Naproxen,Nuprin,Relafen/Nabumetone, Diclofenac,Meloxicam, Aleve, Celebrex) and all vitamins and supplements x 7-10 days prior to surgical date  You can take Tylenol (Acetaminophen) for pain up until the date of your surgery   Do not exceed 3,000 mg per day   Any other medications prescribed, please discuss with your primary care provider at your pre-operative physical. Please discuss when/if it is safe for you to stop taking blood thinners with your primary care provider.   We will NOT provide pain medications prior to surgery. We will prescribe post-op pain medications for up to 6 weeks after surgery.       FMLA/Short-term disability    If you are currently employed, you will likely need to be off work for 4-6 weeks for post-op recovery and healing.  Please fax any FMLA/short term disability paperwork to [phone], mail it into the clinic, drop it off in person, or send via a [employer] message.   You may also call our clinic with the date in which you'd like to return to work, and we can provide a work letter to your employer  We will support Short-Term Disability up to 12 weeks, beginning the date of your surgery. We do NOT support Long-Term Disability/Social Security Benefits.     Pain Management after surgery    Dealing with pain    As your body heals, you might feel a stabbing, burning, or aching pain. You may also have some numbness.  Everyone feels pain differently, we may ask you to rate your pain using a pain scale. This will let us know how much pain you feel.   Keep in mind that medicine won't take away all of your pain. It helps to try other ways to relax and ease pain.     Things to help  with pain    After surgery, we will give you medicine for your pain. These medications work well, but they can make you drowsy, itchy, or sick to your stomach, and constipated. Try to take narcotics with food if they cause nausea.   For mild to moderate pain, you can take medication such as Tylenol or Ibuprofen. These can be used with narcotics and muscle relaxants. *If you have had a fusion: do NOT use NSAIDs for 6 months after surgery.   Do NOT drive while taking narcotic pain medication  Do NOT drink alcohol while using narcotic pain medication  You can utilize ice as needed (no longer than 20 minutes at one time) you may apply this over your covered incision  Utilize heat for muscle spasms, do not apply heat over your incision  If a muscle relaxer is prescribed, please do NOT take it at the same time as your narcotic pain medication. Take them at least 90 minutes apart.   You may also use pain cream/patches on sore muscles. Do NOT apply these on your incision. Patches may be cut in 1/2 if needed.     *After your surgery, if you will be staying in-patient, a nursing team will be monitoring you closely throughout your stay and communicate your health status to your surgeon and other providers.  You will be seen by Advanced Practice Providers (e.g., nurse practitioners, clinic nurse specialists, and physician assistants) who will check on you regularly to assess the status of your surgical recovery.     Incision Care    Look at your incision site every day. You  may need a mirror, or family member to help you.   Do not submerge your incision in water such as pools, hot tubs, baths for at least 6 weeks or until incision is healed  You may get your incision wet in the shower. Allow water and soap to run over incision, and gently pat dry.   Remove the dressing the day after you are discharged from the hospital. Keep the incision clean and dry at all times. This may require several bandage changes.   Contact us right  away if you have:   Fever over 101 degrees farenheit  Green or yellow drainage (pus) from your incision or increased bloody drainage   Redness, swelling, or warmth at your surgery site   Notify the clinic [phone].    Activity Restrictions    For the first 6 weeks, no lifting,pushing, or pulling > 5-10 pounds, no bending, twisting.  Use the stairs in moderation   Walking: Walking is the best way to start exercise after surgery. Take short frequent walks. You may gradually increase the distance as tolerated. If you feel pain, decrease your activity, but DO NOT stop walking. Walking will help you regain strength.  Avoid prolonged positioning for longer than 30 minutes (change positions frequently while awake)  No contact sports until after follow up visit  No high impact activities such as; running/jogging, snowmobile or 4 sumner riding or any other recreational vehicles until deemed safe by your surgeon/care team.   Please call the clinic if you develop any of the following symptoms:  Swelling and/or warmth in one or both legs  Pain or tenderness in your leg, ankle, foot, or arm   Red or discolored/pale skin     Post-Op Follow Up Appointments    We will call you to schedule these appointments after your discharge from the hospital.   Incision assessment within 2 weeks with a Registered Nurse   6 week post-op with a Nurse Practitioner/Physician Assistant. Your surgeon will be available on this day.

## 2024-08-20 NOTE — NURSING NOTE
"Joel Pineda is a 49 year old male who presents for:  Chief Complaint   Patient presents with    Consult     Lumbar  Low back pain  Intermittent stinging pain in lower legs, bilaterally   Pain has gotten worse in last month        Initial Vitals:  /74   Pulse 82   Ht 6' 4\" (1.93 m)   Wt 287 lb (130.2 kg)   SpO2 98%   BMI 34.93 kg/m   Estimated body mass index is 34.93 kg/m  as calculated from the following:    Height as of this encounter: 6' 4\" (1.93 m).    Weight as of this encounter: 287 lb (130.2 kg).. Body surface area is 2.64 meters squared. BP completed using cuff size: regular  Moderate Pain (4)    Oswestry Disability Index (NILA   Bhargav Moreira 1980, All rights reserved. Used with permission)    Section 1 - Pain intensity: The pain is fairly severe at the moment.  Section 2 - Personal care (washing, dressing, etc.) : I can look after myself normally but it is very painful.  Section 3 - Lifting: Pain prevents me from lifting heavy weights but I can manage light to medium weights if they are conveniently positioned.  Section 4 - Walking: Pain prevents me from walking more than 100 yards.  Section 5 - Sitting: Pain prevents me from sitting for more than half an hour.  Section 6 - Standing: Pain prevents me from standing for more than half an hour.  Section 7 - Sleeping: My sleep is never interrupted by pain.  Section 8 - Sex life (if applicable): My sex life is severely restricted by pain.  Section 9 - Social life: Pain has restricted my social life and I do not go out as often.  Section 10 - Traveling: I can travel anywhere but it gives me additional pain.  Sum: 23  Count: 10  Oswestry Score (%): 46 %       Nuno Barr  "

## 2024-08-20 NOTE — PROGRESS NOTES
NEUROSURGERY CONSULTATION NOTE      Neurosurgery was asked to see this patient by Stacia Jack AP* for evaluation of lumbar radiculopathy.       CONSULTATION ASSESSMENT AND PLAN:    Patient is a 50 yo male who is s/p lumbar 5-sacral 1 microdiscectomy in 2007 who presents with bilateral leg pain and back pain. MRI of his lumbar spine was reviewed with the patient. He has stenosis at lumbar 4-5 due to a central disc protrusion.  He has moderate to severe left foraminal narrowing and moderate right foraminal narrowing at this level and overall moderate central stenosis.  At lumbar 5-sacral 1 he has moderate to severe bilateral foraminal narrowing.  X-ray shows no significant spondylolisthesis or instabilities.  Symptoms appear most likely related to L5 nerve impingement in the lateral recess at lumbar 4-5 and foramina at lumbar 5-sacral 1.  Recommend bilateral lumbar 4-lumbar 5 hemilaminectomies, medial facetectomies and foraminotomies with possible lumbar  microdiscectomy and lumbar 5-sacral 1 foraminotomies . Risks and benefits of surgery discussed including but not limited to infection, hematoma, nerve damage including paralysis, post op radiculitis, durotomy, inadequate symptom relief, recurrent lumbar disease, risks associated with the use of general anesthesia.     No further conservative care is indicated and the surgery is medically necessary for the following reasons:  Further physical therapy is not indicated in this case as it would only prolong the patient s suffering without providing any benefit and the delay would increase the risk of neurologic decline and/or symptoms worsening unnecessarily, with no benefit to the patient and possible harm.     The patient has already trialed oral medications as listed in their medication history, and has trialed activity modifications such as decreasing their walking distance.  In spite of this, and in spite of the conservative therapies documented above, the  patient has significant functional disability in their activities of daily living such as prolonged sitting and prolonged walking, and daily chores each of which are very difficult or painful because of the spinal problem. Therefore the proposed surgery is medically necessary and the patient has failed conservative care.     There are no untreated, underlying mental health conditions (including but not limited to psychological conditions or drug or alcohol abuse) that will preclude an appropriate recovery from this surgery or that are contraindications to proceeding with surgery.     Anitha Biggs MD      HISTORY OF PRESENTING ILLNESS:    Patient is a 48 yo male who is s/p lumbar 5-sacral 1 microdiscectomy in 2007 who presents with bilateral leg pain and back pain. Bee sting sensation in calves and in perineum. In the past could have been related to an abscess but no abscess currently. Symptoms on both side. Also has pain in his low back over lumbar area. Leg symptoms are more intermittent. Lyrica helped the prior more severe leg pain. No significant buttock pain currently. Has history of ankylosis spondylitis  and SI joint dysfunction. Denies weakness or bowel or bladder loss. Main symptoms is pain in his posterolateral thighs and calves. He is limited in prolonged sitting or walking currently. Limited to less then 15 minutes at time.     Has tried tylenol, Cymbalta, lyrica, Robaxin, nabumetone, lidocaine gel, Risperdal. Recently had steroid burst for a dental procedure and that really helped his symptoms.   Physical therapy-off and on for years. At orthology now.    Recently underwent injection bilateral lateral femoral cutaneous nerve blocks for bilateral meralgia paresthetica. Lost a lot of weight. On Ozempic . Still has nerve impingements.     In October 2007 underwent a L5-S1 laminectomy and micro discectomy - that was the worst pain in his life prior to surgery. He did well after. Then a month ago he  had recurrent pain.     Past Medical History:   Diagnosis Date    Acne     Acquired hypothyroidism     Allergic state     Ankylosing spondylitis lumbar region (H)     Ankylosing spondylitis of sacral region (H)     Anxiety     Bipolar 2 disorder (H)     Chest pain     Chest pain, regulated w/BP meds. Clear arteries.    Chronic pain     DDD (degenerative disc disease), lumbar     Depressive disorder     Diabetes (H)     Diverticulosis     Facet arthritis of cervical region     Gastroesophageal reflux disease     Hypertension     IBS (irritable bowel syndrome)     Intracranial arachnoid cyst     Major depressive disorder, recurrent episode (H24)     Multiple psych providers - they manage meds August 2015: Provigil induced severe mood dis-function     Major depressive disorder, recurrent episode, severe (H) 12/2/2020    MDD (major depressive disorder), recurrent severe, without psychosis (H) 7/21/2021    Mixed dyslipidemia 4/6/2023    LLOYD (obstructive sleep apnea)- mild (AHI 11)     Polyneuropathy     Pulmonary embolism (H)     Skin exam, screening for cancer 12/3/2013    Sleep apnea     Uncomplicated asthma        Past Surgical History:   Procedure Laterality Date    BACK SURGERY  10/2007    lumbar discectomy L5-S1    BIOPSY  09/15/2020    Colon Adenomas x2    COLONOSCOPY      Note: colonoscopy scheduled with [facility] on Friday, 9/4/15    COSMETIC SURGERY  2012    Nose Exterior - functional    GI SURGERY  08/2013    Sigmoidectomy    HERNIA REPAIR, UMBILICAL  08/23/2011    smallDr. Evan    INCISION AND DRAINAGE, ABSCESS, COMPLEX  08/23/2011    umbilical, Dr. Evan Beavers    LAPAROSCOPIC ASSISTED COLECTOMY LEFT (DESCENDING)  08/15/2013    Procedure: LAPAROSCOPIC ASSISTED COLECTOMY LEFT (DESCENDING);  Laparoscopic Hand Assisted Sigmoid Resection, Mobilization of Splenic Fissure, coloproctoscopy, *Latex Free Room* Anesthesia General with Pain block  ;  Surgeon: Aurora Justice MD;  Location: [facility] OR    NERVE  "SURGERY  08/18/2011    RF ablation @ L3-S1 @ [facility]    RECONSTRUCT NOSE AND SEPTUM (FUNCTIONAL)  10/14/2011    Procedure:RECONSTRUCT NOSE AND SEPTUM (FUNCTIONAL); Functional Septorhinoplasty, Turbinate Reduction, ; Surgeon:CEDRIC CUEVAS; Location:[facility] OR    SINUS SURGERY  10/01/2001    ethmoidectomy chronic sinusitis    SOFT TISSUE SURGERY  4/7/2022    Hidradenitis Suppurativa surgery       REVIEW OF SYSTEMS:  See ROS form under media     MEDICATIONS:    Current Outpatient Medications   Medication Sig Dispense Refill    acetaminophen 500 MG CAPS Take 500 mg by mouth 3 times daily      albuterol (PROAIR HFA/PROVENTIL HFA/VENTOLIN HFA) 108 (90 Base) MCG/ACT inhaler Inhale 2 puffs into the lungs every 6 hours as needed for shortness of breath, wheezing or cough 90 day supply 25.5 g 3    B-D LUER-LUCILLE SYRINGE 25G X 1\" 3 ML MISC USE WITH B12 INJECTIONS 12 each 0    bisacodyl (DULCOLAX) 5 MG EC tablet Take 10 mg by mouth as needed for constipation      buPROPion (WELLBUTRIN XL) 300 MG 24 hr tablet Take 1 tablet by mouth daily      cholecalciferol (D3-50) 1250 mcg (15896 units) capsule TAKE ONE CAPSULE BY MOUTH EVERY 2 WEEKS 24 capsule 0    clindamycin (CLEOCIN T) 1 % external lotion APPLY TWICE DAILY FOR FOLLICULITIS 60 mL 0    clonazePAM (KLONOPIN) 0.5 MG tablet Take 0.5 mg by mouth daily as needed for anxiety And 1mg at bedtime      clonazePAM (KLONOPIN) 0.5 MG tablet Take 1 mg by mouth At Bedtime For RBD      cyanocobalamin (CYANOCOBALAMIN) 1000 MCG/ML injection INJECT 1 ML INTO THE MUSCLE EVERY 30 DAYS 10 mL 0    diphenhydrAMINE (BENADRYL) 25 MG capsule Take 25 mg by mouth once a week Before Enbrel injection      divalproex sodium delayed-release (DEPAKOTE) 125 MG DR tablet Take 125 mg by mouth 2 times daily      docusate sodium (COLACE) 50 MG capsule Take 100 mg by mouth 2 times daily      DULoxetine (CYMBALTA) 60 MG capsule Take 120 mg by mouth daily       EPINEPHrine (ANY BX GENERIC EQUIV) 0.3 MG/0.3ML injection " 2-pack Inject 0.3 mLs (0.3 mg) into the muscle as needed for anaphylaxis May repeat one time in 5-15 minutes if response to initial dose is inadequate. 2 each 3    eszopiclone (LUNESTA) 3 MG tablet Take 3 mg by mouth At Bedtime       etanercept (ENBREL SURECLICK) 50 MG/ML autoinjector Inject 50 mg Subcutaneous once a week . Hold for signs of infection, and seek medical attention. 4 mL 7    famotidine (PEPCID) 20 MG tablet Take 20 mg by mouth daily      fenofibrate (TRICOR) 48 MG tablet TAKE ONE TABLET BY MOUTH ONCE DAILY 90 tablet 2    fexofenadine (ALLEGRA) 180 MG tablet Take 180 mg by mouth daily      fluticasone (FLONASE) 50 MCG/ACT nasal spray Spray 2 sprays in nostril daily      fluticasone-salmeterol (WIXELA INHUB) 100-50 MCG/ACT inhaler Inhale 1 puff into the lungs every 12 hours 3 each 3    guaiFENesin (MUCINEX) 600 MG 12 hr tablet Take 1 tablet (600 mg) by mouth daily      levothyroxine (SYNTHROID/LEVOTHROID) 75 MCG tablet TAKE ONE TABLET BY MOUTH EVERY MORNING 90 tablet 2    lidocaine (XYLOCAINE) 5 % external ointment Apply topically 2 times daily as needed for moderate pain 50 g 3    Melatonin 10 MG TABS tablet Take 10 mg by mouth at bedtime      methocarbamol (ROBAXIN) 500 MG tablet TAKE 1 - 2 TABLETS BY MOUTH 4 TIMES DAILY AS NEEDED FOR MUSCLE SPASMS 240 tablet 1    metoclopramide (REGLAN) 10 MG tablet Take 10 mg by mouth 4 times daily (before meals and nightly) Takes half a tab for gastroperisis      metoprolol succinate ER (TOPROL XL) 100 MG 24 hr tablet TAKE 1 TABLET BY MOUTH IN THE EVENING; TO BE USED WITH 200MG IN MORNING 90 tablet 2    metoprolol succinate ER (TOPROL XL) 200 MG 24 hr tablet TAKE 1 TABLET BY MOUTH TWICE DAILY 180 tablet 0    montelukast (SINGULAIR) 10 MG tablet TAKE ONE TABLET BY MOUTH EVERY EVENING 90 tablet 0    nabumetone (RELAFEN) 500 MG tablet Take 1-2 tablets (500-1,000 mg) by mouth 2 times daily as needed for moderate pain 120 tablet 1    naloxone (NARCAN) 4 MG/0.1ML nasal  "spray Spray 4 mg into one nostril alternating nostrils as needed for opioid reversal every 2-3 minutes until assistance arrives. This medication is an antidote and stays on the chart as an \"as needed\" medication      olopatadine (PATADAY) 0.2 % ophthalmic solution Place 1 drop into both eyes daily 2.5 mL 3    omega-3 acid ethyl esters (LOVAZA) 1 g capsule TAKE TWO CAPSULES BY MOUTH TWICE DAILY 360 capsule 0    omeprazole (PRILOSEC) 20 MG DR capsule Take 20 mg by mouth as needed      OnabotulinumtoxinA (BOTOX IJ)       ondansetron (ZOFRAN ODT) 8 MG ODT tab Take 1 tablet (8 mg) by mouth every 8 hours as needed for nausea 20 tablet 0    oxyCODONE (ROXICODONE) 5 MG tablet Take 1 tablet (5 mg) by mouth 2 times daily as needed for breakthrough pain Ok to fill 08/22/24 and begin using on 08/24/24. #30 days supplyfor chronic pain 60 tablet 0    pregabalin (LYRICA) 150 MG capsule Take 1 capsule (150 mg) by mouth 3 times daily 270 capsule 1    pyridostigmine (MESTINON) 60 MG tablet Take 1 tablet by mouth 3 times daily      ramipril (ALTACE) 10 MG capsule TAKE ONE CAPSULE BY MOUTH ONCE DAILY 90 capsule 0    riboflavin 100 MG CAPS Take 200 mg by mouth daily Two hundred milligrams nightly to prevent migraine      risperiDONE (RISPERDAL) 0.5 MG tablet Take 0.5 mg by mouth 2 times daily as needed      rosuvastatin (CRESTOR) 40 MG tablet TAKE ONE TABLET BY MOUTH ONCE DAILY 90 tablet 2    Semaglutide, 1 MG/DOSE, (OZEMPIC) 4 MG/3ML pen Inject 1 mg Subcutaneous once a week 3 mL 11    sodium chloride 0.9 % neb solution Take 3 mLs by nebulization every 3 hours as needed      sodium fluoride 1.1 % CREA At Bedtime      triamcinolone (KENALOG) 0.1 % external cream Apply twice daily for up to 2 weeks per month on hands. 80 g 3    UBRELVY 100 MG tablet Take 100 mg by mouth at onset of headache      valACYclovir (VALTREX) 500 MG tablet Take 1 tablet (500 mg) by mouth daily 90 tablet 3    vitamin B complex with vitamin C (STRESS TAB) tablet " Take 1 tablet by mouth daily      ZINC SULFATE-VITAMIN C MT Take 1 tablet by mouth daily           ALLERGIES/SENSITIVITIES:     Allergies   Allergen Reactions    Amoxicillin-Pot Clavulanate Difficulty breathing    Banana Shortness Of Breath     Pt reports organic Banana is okay.     Clavulanic Acid Anaphylaxis     Other Reaction(s): Throat swelling    Nitroglycerin Palpitations    Penicillins Anaphylaxis    Provigil [Modafinil] Shortness Of Breath     headache    Gadolinium Hives and Itching     Patient was premedicated for the contrast allergy. He did still have a reaction a few hours after injection. Hives and itching. Dr. Gomez told tech to inform pt he should only have contrast again in the future when premedicated and at a hospital. Not at an outpatient facility.     Haemophilus B Polysaccharide Vaccine Rash     Quadrivalent, cell based    Influenza Virus Vaccine Rash     Quadrivalent, cell based    Ketoconazole      Topical cream caused swelling and itching    Dye [Contrast Dye] Other (See Comments) and Hives     Moderate flushing, CT contrast  Iodinated and gadolinium    Formoterol     Gabapentin      Other reaction(s): hives    Golimumab      Hives, bradycardia, face swelling    Mometasone     Naproxen      Other reaction(s): Bleeding Gums    Neurontin [Gabapentin] Hives     Moderate hives    Nortriptyline Hives    Nystatin Hives    Varicella Virus Vaccine Live      Rash    Adhesive Tape Rash    Baclofen Other (See Comments)     Cognitive changes    Flagyl [Metronidazole Hcl] Palpitations and Hives    Latex Rash    Metronidazole Palpitations, Other (See Comments) and Rash     dizziness (versus ciprofloxacin taken at same time)    Sulfamethizole Rash     Other Reaction(s): Rash       PERTINENT SOCIAL HISTORY:   Social History     Socioeconomic History    Marital status: Single   Occupational History    Occupation:      Employer: [employer]    Occupation: paraprofessional     Comment:  "[employer]     Employer: [employer]   Tobacco Use    Smoking status: Never    Smokeless tobacco: Never   Vaping Use    Vaping status: Never Used   Substance and Sexual Activity    Alcohol use: Not Currently     Comment: occ 1/week    Drug use: No     Comment: synthetic THC for nausea    Sexual activity: Not Currently     Partners: Female     Birth control/protection: Condom, Pill   Other Topics Concern    Parent/sibling w/ CABG, MI or angioplasty before 65F 55M? Yes     Comment: 51yo brother survived a \"massive\" PE several months ago    Sleep Concern Yes    Stress Concern Yes    Back Care Yes    Seat Belt Yes     Social Determinants of Health     Financial Resource Strain: Low Risk  (11/15/2023)    Financial Resource Strain     Within the past 12 months, have you or your family members you live with been unable to get utilities (heat, electricity) when it was really needed?: No   Food Insecurity: Low Risk  (11/15/2023)    Food Insecurity     Within the past 12 months, did you worry that your food would run out before you got money to buy more?: No     Within the past 12 months, did the food you bought just not last and you didn t have money to get more?: No   Transportation Needs: High Risk (11/15/2023)    Transportation Needs     Within the past 12 months, has lack of transportation kept you from medical appointments, getting your medicines, non-medical meetings or appointments, work, or from getting things that you need?: Yes   Interpersonal Safety: Low Risk  (2/29/2024)    Interpersonal Safety     Do you feel physically and emotionally safe where you currently live?: Yes     Within the past 12 months, have you been hit, slapped, kicked or otherwise physically hurt by someone?: No     Within the past 12 months, have you been humiliated or emotionally abused in other ways by your partner or ex-partner?: No   Housing Stability: Low Risk  (11/15/2023)    Housing Stability     Do you have housing? : Yes     Are you " "worried about losing your housing?: No         FAMILY HISTORY:  Family History   Problem Relation Age of Onset    Musculoskeletal Disorder Mother         back    Anxiety Disorder Mother     Colon Polyps Mother     Ulcerative Colitis Mother         and ischemic small intestine, surgery    Hypertension Mother     Breast Cancer Mother     Osteoporosis Mother     Diabetes Mother         Type 2, Diagnosed in 2014    Depression Mother         Takes Cymbalta to help with chronic pain + depx    Thyroid Disease Mother         Hypothyroidism    Obesity Mother         Under much better control latter half of 2015    Musculoskeletal Disorder Father         back    Substance Abuse Father         Alcohol    Hypertension Father     Hyperlipidemia Father     Depression Father         Off meds for many years. Seems \"ok\"    Anxiety Disorder Father     Heart Disease Maternal Grandmother     Heart Disease Maternal Grandfather     Psychotic Disorder Paternal Grandfather     Suicide Paternal Grandfather     Depression Paternal Grandfather         Pediatrician. Committed suicide by pistol in 1990.    Musculoskeletal Disorder Brother         back    Depression Brother         Expressed as anger and moodiness    Substance Abuse Brother         Alcohol    Anxiety Disorder Brother     Substance Abuse Sister         Alcohol    Depression Sister         Mental Health Therapist, yet no anti-depressants?    Anxiety Disorder Sister         Mental Health Therapist, yet no anti-anxiety meds?    Other Cancer Other         Bladder    Substance Abuse Brother     Colon Cancer No family hx of     Crohn's Disease No family hx of     Anesthesia Reaction No family hx of     Cancer No family hx of         No family history of skin cancer        PHYSICAL EXAM:   Constitutional: /74   Pulse 82   Ht 1.93 m (6' 4\")   Wt 130.2 kg (287 lb)   SpO2 98%   BMI 34.93 kg/m       Mental Status: A & O in no acute distress.  Affect is appropriate.  Speech is " fluent.  Recent and remote memory are intact.  Attention span and concentration are normal.     Motor: Normal bulk and tone all muscle groups of upper and lower extremities.    Strength: 5/5 in LE     Sensory: Sensation intact bilaterally to light touch in LE      Coordination; Heel/toe gait intact.  Normal gait and station.     Reflexes; knee/ ankle jerk intact 2+    IMAGING:  I personally reviewed all radiographic images         Cc:   Ksenia Lyles

## 2024-08-20 NOTE — LETTER
8/20/2024      Joel Pineda  [address] [place] [address]      Dear Colleague,    Thank you for referring your patient, Joel Pineda, to the [facility]. Please see a copy of my visit note below.    NEUROSURGERY CONSULTATION NOTE      Neurosurgery was asked to see this patient by Stacia Jack AP* for evaluation of lumbar radiculopathy.       CONSULTATION ASSESSMENT AND PLAN:    Patient is a 50 yo male who is s/p lumbar 5-sacral 1 microdiscectomy in 2007 who presents with bilateral leg pain and back pain. MRI of his lumbar spine was reviewed with the patient. He has stenosis at lumbar 4-5 due to a central disc protrusion.  He has moderate to severe left foraminal narrowing and moderate right foraminal narrowing at this level and overall moderate central stenosis.  At lumbar 5-sacral 1 he has moderate to severe bilateral foraminal narrowing.  X-ray shows no significant spondylolisthesis or instabilities.  Symptoms appear most likely related to L5 nerve impingement in the lateral recess at lumbar 4-5 and foramina at lumbar 5-sacral 1.  Recommend bilateral lumbar 4-lumbar 5 hemilaminectomies, medial facetectomies and foraminotomies with possible lumbar  microdiscectomy and lumbar 5-sacral 1 foraminotomies . Risks and benefits of surgery discussed including but not limited to infection, hematoma, nerve damage including paralysis, post op radiculitis, durotomy, inadequate symptom relief, recurrent lumbar disease, risks associated with the use of general anesthesia.     No further conservative care is indicated and the surgery is medically necessary for the following reasons:  Further physical therapy is not indicated in this case as it would only prolong the patient s suffering without providing any benefit and the delay would increase the risk of neurologic decline and/or symptoms worsening unnecessarily, with no benefit to the patient and possible harm.     The  patient has already trialed oral medications as listed in their medication history, and has trialed activity modifications such as decreasing their walking distance.  In spite of this, and in spite of the conservative therapies documented above, the patient has significant functional disability in their activities of daily living such as prolonged sitting and prolonged walking, and daily chores each of which are very difficult or painful because of the spinal problem. Therefore the proposed surgery is medically necessary and the patient has failed conservative care.     There are no untreated, underlying mental health conditions (including but not limited to psychological conditions or drug or alcohol abuse) that will preclude an appropriate recovery from this surgery or that are contraindications to proceeding with surgery.     Anitha Biggs MD      HISTORY OF PRESENTING ILLNESS:    Patient is a 50 yo male who is s/p lumbar 5-sacral 1 microdiscectomy in 2007 who presents with bilateral leg pain and back pain. Bee sting sensation in calves and in perineum. In the past could have been related to an abscess but no abscess currently. Symptoms on both side. Also has pain in his low back over lumbar area. Leg symptoms are more intermittent. Lyrica helped the prior more severe leg pain. No significant buttock pain currently. Has history of ankylosis spondylitis  and SI joint dysfunction. Denies weakness or bowel or bladder loss. Main symptoms is pain in his posterolateral thighs and calves. He is limited in prolonged sitting or walking currently. Limited to less then 15 minutes at time.     Has tried tylenol, Cymbalta, lyrica, Robaxin, nabumetone, lidocaine gel, Risperdal. Recently had steroid burst for a dental procedure and that really helped his symptoms.   Physical therapy-off and on for years. At orthology now.    Recently underwent injection bilateral lateral femoral cutaneous nerve blocks for bilateral  meralgia paresthetica. Lost a lot of weight. On Ozempic . Still has nerve impingements.     In October 2007 underwent a L5-S1 laminectomy and micro discectomy - that was the worst pain in his life prior to surgery. He did well after. Then a month ago he had recurrent pain.     Past Medical History:   Diagnosis Date     Acne      Acquired hypothyroidism      Allergic state      Ankylosing spondylitis lumbar region (H)      Ankylosing spondylitis of sacral region (H)      Anxiety      Bipolar 2 disorder (H)      Chest pain     Chest pain, regulated w/BP meds. Clear arteries.     Chronic pain      DDD (degenerative disc disease), lumbar      Depressive disorder      Diabetes (H)      Diverticulosis      Facet arthritis of cervical region      Gastroesophageal reflux disease      Hypertension      IBS (irritable bowel syndrome)      Intracranial arachnoid cyst      Major depressive disorder, recurrent episode (H24)     Multiple psych providers - they manage meds August 2015: Provigil induced severe mood dis-function      Major depressive disorder, recurrent episode, severe (H) 12/2/2020     MDD (major depressive disorder), recurrent severe, without psychosis (H) 7/21/2021     Mixed dyslipidemia 4/6/2023     LLOYD (obstructive sleep apnea)- mild (AHI 11)      Polyneuropathy      Pulmonary embolism (H)      Skin exam, screening for cancer 12/3/2013     Sleep apnea      Uncomplicated asthma        Past Surgical History:   Procedure Laterality Date     BACK SURGERY  10/2007    lumbar discectomy L5-S1     BIOPSY  09/15/2020    Colon Adenomas x2     COLONOSCOPY      Note: colon[facility] scheduled [facility] on Friday, 9/4/15     COSMETIC SURGERY  2012    Nose Exterior - functional     GI SURGERY  08/2013    Sigmoidectomy     HERNIA REPAIR, UMBILICAL  08/23/2011    Dr. Evan whiting     INCISION AND DRAINAGE, ABSCESS, COMPLEX  08/23/2011    umbilical, Dr. Evan Beavers     LAPAROSCOPIC ASSISTED COLECTOMY LEFT (DESCENDING)   "08/15/2013    Procedure: LAPAROSCOPIC ASSISTED COLECTOMY LEFT (DESCENDING);  Laparoscopic Hand Assisted Sigmoid Resection, Mobilization of Splenic Fissure, coloproctoscopy, *Latex Free Room* Anesthesia General with Pain block  ;  Surgeon: Aurora Justice MD;  Location: [facility] OR     NERVE SURGERY  08/18/2011    RF ablation @ L3-S1 @ [facility]     RECONSTRUCT NOSE AND SEPTUM (FUNCTIONAL)  10/14/2011    Procedure:RECONSTRUCT NOSE AND SEPTUM (FUNCTIONAL); Functional Septorhinoplasty, Turbinate Reduction, ; Surgeon:CEDRIC CUEVAS; Location:[facility] OR     SINUS SURGERY  10/01/2001    ethmoidectomy chronic sinusitis     SOFT TISSUE SURGERY  4/7/2022    Hidradenitis Suppurativa surgery       REVIEW OF SYSTEMS:  See ROS form under media     MEDICATIONS:    Current Outpatient Medications   Medication Sig Dispense Refill     acetaminophen 500 MG CAPS Take 500 mg by mouth 3 times daily       albuterol (PROAIR HFA/PROVENTIL HFA/VENTOLIN HFA) 108 (90 Base) MCG/ACT inhaler Inhale 2 puffs into the lungs every 6 hours as needed for shortness of breath, wheezing or cough 90 day supply 25.5 g 3     B-D LUER-LUCILLE SYRINGE 25G X 1\" 3 ML MISC USE WITH B12 INJECTIONS 12 each 0     bisacodyl (DULCOLAX) 5 MG EC tablet Take 10 mg by mouth as needed for constipation       buPROPion (WELLBUTRIN XL) 300 MG 24 hr tablet Take 1 tablet by mouth daily       cholecalciferol (D3-50) 1250 mcg (31955 units) capsule TAKE ONE CAPSULE BY MOUTH EVERY 2 WEEKS 24 capsule 0     clindamycin (CLEOCIN T) 1 % external lotion APPLY TWICE DAILY FOR FOLLICULITIS 60 mL 0     clonazePAM (KLONOPIN) 0.5 MG tablet Take 0.5 mg by mouth daily as needed for anxiety And 1mg at bedtime       clonazePAM (KLONOPIN) 0.5 MG tablet Take 1 mg by mouth At Bedtime For RBD       cyanocobalamin (CYANOCOBALAMIN) 1000 MCG/ML injection INJECT 1 ML INTO THE MUSCLE EVERY 30 DAYS 10 mL 0     diphenhydrAMINE (BENADRYL) 25 MG capsule Take 25 mg by mouth once a week Before Enbrel injection       " divalproex sodium delayed-release (DEPAKOTE) 125 MG DR tablet Take 125 mg by mouth 2 times daily       docusate sodium (COLACE) 50 MG capsule Take 100 mg by mouth 2 times daily       DULoxetine (CYMBALTA) 60 MG capsule Take 120 mg by mouth daily        EPINEPHrine (ANY BX GENERIC EQUIV) 0.3 MG/0.3ML injection 2-pack Inject 0.3 mLs (0.3 mg) into the muscle as needed for anaphylaxis May repeat one time in 5-15 minutes if response to initial dose is inadequate. 2 each 3     eszopiclone (LUNESTA) 3 MG tablet Take 3 mg by mouth At Bedtime        etanercept (ENBREL SURECLICK) 50 MG/ML autoinjector Inject 50 mg Subcutaneous once a week . Hold for signs of infection, and seek medical attention. 4 mL 7     famotidine (PEPCID) 20 MG tablet Take 20 mg by mouth daily       fenofibrate (TRICOR) 48 MG tablet TAKE ONE TABLET BY MOUTH ONCE DAILY 90 tablet 2     fexofenadine (ALLEGRA) 180 MG tablet Take 180 mg by mouth daily       fluticasone (FLONASE) 50 MCG/ACT nasal spray Spray 2 sprays in nostril daily       fluticasone-salmeterol (WIXELA INHUB) 100-50 MCG/ACT inhaler Inhale 1 puff into the lungs every 12 hours 3 each 3     guaiFENesin (MUCINEX) 600 MG 12 hr tablet Take 1 tablet (600 mg) by mouth daily       levothyroxine (SYNTHROID/LEVOTHROID) 75 MCG tablet TAKE ONE TABLET BY MOUTH EVERY MORNING 90 tablet 2     lidocaine (XYLOCAINE) 5 % external ointment Apply topically 2 times daily as needed for moderate pain 50 g 3     Melatonin 10 MG TABS tablet Take 10 mg by mouth at bedtime       methocarbamol (ROBAXIN) 500 MG tablet TAKE 1 - 2 TABLETS BY MOUTH 4 TIMES DAILY AS NEEDED FOR MUSCLE SPASMS 240 tablet 1     metoclopramide (REGLAN) 10 MG tablet Take 10 mg by mouth 4 times daily (before meals and nightly) Takes half a tab for gastroperisis       metoprolol succinate ER (TOPROL XL) 100 MG 24 hr tablet TAKE 1 TABLET BY MOUTH IN THE EVENING; TO BE USED WITH 200MG IN MORNING 90 tablet 2     metoprolol succinate ER (TOPROL XL) 200 MG  "24 hr tablet TAKE 1 TABLET BY MOUTH TWICE DAILY 180 tablet 0     montelukast (SINGULAIR) 10 MG tablet TAKE ONE TABLET BY MOUTH EVERY EVENING 90 tablet 0     nabumetone (RELAFEN) 500 MG tablet Take 1-2 tablets (500-1,000 mg) by mouth 2 times daily as needed for moderate pain 120 tablet 1     naloxone (NARCAN) 4 MG/0.1ML nasal spray Spray 4 mg into one nostril alternating nostrils as needed for opioid reversal every 2-3 minutes until assistance arrives. This medication is an antidote and stays on the chart as an \"as needed\" medication       olopatadine (PATADAY) 0.2 % ophthalmic solution Place 1 drop into both eyes daily 2.5 mL 3     omega-3 acid ethyl esters (LOVAZA) 1 g capsule TAKE TWO CAPSULES BY MOUTH TWICE DAILY 360 capsule 0     omeprazole (PRILOSEC) 20 MG DR capsule Take 20 mg by mouth as needed       OnabotulinumtoxinA (BOTOX IJ)        ondansetron (ZOFRAN ODT) 8 MG ODT tab Take 1 tablet (8 mg) by mouth every 8 hours as needed for nausea 20 tablet 0     oxyCODONE (ROXICODONE) 5 MG tablet Take 1 tablet (5 mg) by mouth 2 times daily as needed for breakthrough pain Ok to fill 08/22/24 and begin using on 08/24/24. #30 days supplyfor chronic pain 60 tablet 0     pregabalin (LYRICA) 150 MG capsule Take 1 capsule (150 mg) by mouth 3 times daily 270 capsule 1     pyridostigmine (MESTINON) 60 MG tablet Take 1 tablet by mouth 3 times daily       ramipril (ALTACE) 10 MG capsule TAKE ONE CAPSULE BY MOUTH ONCE DAILY 90 capsule 0     riboflavin 100 MG CAPS Take 200 mg by mouth daily Two hundred milligrams nightly to prevent migraine       risperiDONE (RISPERDAL) 0.5 MG tablet Take 0.5 mg by mouth 2 times daily as needed       rosuvastatin (CRESTOR) 40 MG tablet TAKE ONE TABLET BY MOUTH ONCE DAILY 90 tablet 2     Semaglutide, 1 MG/DOSE, (OZEMPIC) 4 MG/3ML pen Inject 1 mg Subcutaneous once a week 3 mL 11     sodium chloride 0.9 % neb solution Take 3 mLs by nebulization every 3 hours as needed       sodium fluoride 1.1 % CREA " At Bedtime       triamcinolone (KENALOG) 0.1 % external cream Apply twice daily for up to 2 weeks per month on hands. 80 g 3     UBRELVY 100 MG tablet Take 100 mg by mouth at onset of headache       valACYclovir (VALTREX) 500 MG tablet Take 1 tablet (500 mg) by mouth daily 90 tablet 3     vitamin B complex with vitamin C (STRESS TAB) tablet Take 1 tablet by mouth daily       ZINC SULFATE-VITAMIN C MT Take 1 tablet by mouth daily           ALLERGIES/SENSITIVITIES:     Allergies   Allergen Reactions     Amoxicillin-Pot Clavulanate Difficulty breathing     Banana Shortness Of Breath     Pt reports organic Banana is okay.      Clavulanic Acid Anaphylaxis     Other Reaction(s): Throat swelling     Nitroglycerin Palpitations     Penicillins Anaphylaxis     Provigil [Modafinil] Shortness Of Breath     headache     Gadolinium Hives and Itching     Patient was premedicated for the contrast allergy. He did still have a reaction a few hours after injection. Hives and itching. Dr. Gomez told tech to inform pt he should only have contrast again in the future when premedicated and at a hospital. Not at an outpatient facility.      Haemophilus B Polysaccharide Vaccine Rash     Quadrivalent, cell based     Influenza Virus Vaccine Rash     Quadrivalent, cell based     Ketoconazole      Topical cream caused swelling and itching     Dye [Contrast Dye] Other (See Comments) and Hives     Moderate flushing, CT contrast  Iodinated and gadolinium     Formoterol      Gabapentin      Other reaction(s): hives     Golimumab      Hives, bradycardia, face swelling     Mometasone      Naproxen      Other reaction(s): Bleeding Gums     Neurontin [Gabapentin] Hives     Moderate hives     Nortriptyline Hives     Nystatin Hives     Varicella Virus Vaccine Live      Rash     Adhesive Tape Rash     Baclofen Other (See Comments)     Cognitive changes     Flagyl [Metronidazole Hcl] Palpitations and Hives     Latex Rash     Metronidazole  "Palpitations, Other (See Comments) and Rash     dizziness (versus ciprofloxacin taken at same time)     Sulfamethizole Rash     Other Reaction(s): Rash       PERTINENT SOCIAL HISTORY:   Social History     Socioeconomic History     Marital status: Single   Occupational History     Occupation:      Employer: [employer]     Occupation: paraprofessional     Comment: [employer]     Employer: [employer]   Tobacco Use     Smoking status: Never     Smokeless tobacco: Never   Vaping Use     Vaping status: Never Used   Substance and Sexual Activity     Alcohol use: Not Currently     Comment: occ 1/week     Drug use: No     Comment: synthetic THC for nausea     Sexual activity: Not Currently     Partners: Female     Birth control/protection: Condom, Pill   Other Topics Concern     Parent/sibling w/ CABG, MI or angioplasty before 65F 55M? Yes     Comment: 49yo brother survived a \"massive\" PE several months ago     Sleep Concern Yes     Stress Concern Yes     Back Care Yes     Seat Belt Yes     Social Determinants of Health     Financial Resource Strain: Low Risk  (11/15/2023)    Financial Resource Strain      Within the past 12 months, have you or your family members you live with been unable to get utilities (heat, electricity) when it was really needed?: No   Food Insecurity: Low Risk  (11/15/2023)    Food Insecurity      Within the past 12 months, did you worry that your food would run out before you got money to buy more?: No      Within the past 12 months, did the food you bought just not last and you didn t have money to get more?: No   Transportation Needs: High Risk (11/15/2023)    Transportation Needs      Within the past 12 months, has lack of transportation kept you from medical appointments, getting your medicines, non-medical meetings or appointments, work, or from getting things that you need?: Yes   Interpersonal Safety: Low Risk  (2/29/2024)    Interpersonal Safety      Do you feel physically " "and emotionally safe where you currently live?: Yes      Within the past 12 months, have you been hit, slapped, kicked or otherwise physically hurt by someone?: No      Within the past 12 months, have you been humiliated or emotionally abused in other ways by your partner or ex-partner?: No   Housing Stability: Low Risk  (11/15/2023)    Housing Stability      Do you have housing? : Yes      Are you worried about losing your housing?: No         FAMILY HISTORY:  Family History   Problem Relation Age of Onset     Musculoskeletal Disorder Mother         back     Anxiety Disorder Mother      Colon Polyps Mother      Ulcerative Colitis Mother         and ischemic small intestine, surgery     Hypertension Mother      Breast Cancer Mother      Osteoporosis Mother      Diabetes Mother         Type 2, Diagnosed in 2014     Depression Mother         Takes Cymbalta to help with chronic pain + depx     Thyroid Disease Mother         Hypothyroidism     Obesity Mother         Under much better control latter half of 2015     Musculoskeletal Disorder Father         back     Substance Abuse Father         Alcohol     Hypertension Father      Hyperlipidemia Father      Depression Father         Off meds for many years. Seems \"ok\"     Anxiety Disorder Father      Heart Disease Maternal Grandmother      Heart Disease Maternal Grandfather      Psychotic Disorder Paternal Grandfather      Suicide Paternal Grandfather      Depression Paternal Grandfather         Pediatrician. Committed suicide by pistol in 1990.     Musculoskeletal Disorder Brother         back     Depression Brother         Expressed as anger and moodiness     Substance Abuse Brother         Alcohol     Anxiety Disorder Brother      Substance Abuse Sister         Alcohol     Depression Sister         Mental Health Therapist, yet no anti-depressants?     Anxiety Disorder Sister         Mental Health Therapist, yet no anti-anxiety meds?     Other Cancer Other         " "Bladder     Substance Abuse Brother      Colon Cancer No family hx of      Crohn's Disease No family hx of      Anesthesia Reaction No family hx of      Cancer No family hx of         No family history of skin cancer        PHYSICAL EXAM:   Constitutional: /74   Pulse 82   Ht 1.93 m (6' 4\")   Wt 130.2 kg (287 lb)   SpO2 98%   BMI 34.93 kg/m       Mental Status: A & O in no acute distress.  Affect is appropriate.  Speech is fluent.  Recent and remote memory are intact.  Attention span and concentration are normal.     Motor: Normal bulk and tone all muscle groups of upper and lower extremities.    Strength: 5/5 in LE     Sensory: Sensation intact bilaterally to light touch in LE      Coordination; Heel/toe gait intact.  Normal gait and station.     Reflexes; knee/ ankle jerk intact 2+    IMAGING:  I personally reviewed all radiographic images         Cc:   Ksenia Lyles             Again, thank you for allowing me to participate in the care of your patient.        Sincerely,        Anitha Biggs MD  "

## 2024-08-21 ENCOUNTER — PREP FOR PROCEDURE (OUTPATIENT)
Dept: NEUROLOGY | Facility: CLINIC | Age: 49
End: 2024-08-21
Payer: MEDICARE

## 2024-08-21 DIAGNOSIS — M54.16 LUMBAR RADICULOPATHY: Primary | ICD-10-CM

## 2024-08-22 ENCOUNTER — TELEPHONE (OUTPATIENT)
Dept: NEUROSURGERY | Facility: CLINIC | Age: 49
End: 2024-08-22

## 2024-08-22 ENCOUNTER — VIRTUAL VISIT (OUTPATIENT)
Dept: PALLIATIVE MEDICINE | Facility: CLINIC | Age: 49
End: 2024-08-22
Payer: MEDICARE

## 2024-08-22 DIAGNOSIS — G57.13 MERALGIA PARESTHETICA OF BOTH LOWER EXTREMITIES: ICD-10-CM

## 2024-08-22 DIAGNOSIS — G62.9 PERIPHERAL POLYNEUROPATHY: ICD-10-CM

## 2024-08-22 DIAGNOSIS — M79.7 FIBROMYALGIA: ICD-10-CM

## 2024-08-22 DIAGNOSIS — G43.111 INTRACTABLE MIGRAINE WITH AURA WITH STATUS MIGRAINOSUS: ICD-10-CM

## 2024-08-22 DIAGNOSIS — M47.817 LUMBOSACRAL SPONDYLOSIS WITHOUT MYELOPATHY: ICD-10-CM

## 2024-08-22 DIAGNOSIS — G89.29 CHRONIC INTRACTABLE PAIN: Primary | ICD-10-CM

## 2024-08-22 PROCEDURE — 96158 HLTH BHV IVNTJ INDIV 1ST 30: CPT | Mod: 95 | Performed by: PSYCHOLOGIST

## 2024-08-22 PROCEDURE — 96159 HLTH BHV IVNTJ INDIV EA ADDL: CPT | Mod: 95 | Performed by: PSYCHOLOGIST

## 2024-08-22 NOTE — PROGRESS NOTES
Joel Pineda is a 49 year old male who is being evaluated via a billable video visit.      Patient is currently in the [facility]? yes    Patient would like the video invitation sent by: Text to cell phone: [phone]}    Video Start Time: 2:00 PM  Video Stop Time: 2:54 PM       Additional provider notes:     Pain Diagnoses per pain provider:    Chronic intractable pain     Fibromyalgia     Intractable migraine with aura with status migrainosus     Lumbosacral spondylosis without myelopathy     Meralgia paresthetica of both lower extremities     Peripheral polyneuropathy            DATA: During today's visit you reported the following: Your chronic pain is mildly increased. Your mood is variable - anxious about idea of surgery, family dynamics. Your activity level is unchanged - pain prohibits movement regularly. Your stress level is mildly increased - [employer] challenges, idea of surgery. Your sleep is about the same. You reported engaging in self-care for your pain 2-3 times daily.    You identified that you would like to focus on the following or had questions regarding the following issues or concerns, and we discussed the following:   - saw surgeon and approved for surgery, but would like second opinion  - seeing two ENTs next week for vocal cord procedure options  - reminding yourself it is hard to walk, hard to sit for long periods of time - this prohibits a lot of activity outside the home exclusive of self-reported agoraphobia  - frustrated brother suggested coming to [place] for Thanksgiving and to [place] for father's 80th in December as this could be cost prohibitive and you may be recovering from surgery   - sister had good conversation with brother about being more in the middle instead of extremes  - enjoying foster cat Facebook page    ASSESSMENT: Tito reports pain is mildly increased. He is weighing pros/cons for proposed spinal surgery and exploring options for vocal  cord surgery as well.     PLAN:   Your next appointment is scheduled for 9/4 at 3:00 PM.  Assignment/Objectives /interventions for next session:   - continue to challenge negative self-talk  - continue to engage in self-care as often as needed    We believe regular attendance is key to your success in our program!    Any time you are unable to keep your appointment we ask that you call us at [phone] at least 24 hours in advance to cancel.This will allow us to offer the appointment time to another patient.   Multiple missed appointments may lead to dismissal from the clinic.    Telemedicine Visit: The patient's condition can be safely assessed and treated via synchronous audio and visual telemedicine encounter.      Reason for Telemedicine Visit: Services only offered telehealth    Originating Site (Patient Location): Patient's home    Distant Site (Provider Location): Provider Remote Setting- Home Office    Consent:  The patient/guardian has verbally consented to: the potential risks and benefits of telemedicine (video visit) versus in person care; bill my insurance or make self-payment for services provided; and responsibility for payment of non-covered services.     Mode of Communication:  Video Conference via [employer]    As the provider I attest to compliance with applicable laws and regulations related to telemedicine.      Shirley Bartlett PsyD LP  Licensed Psychologist  Outpatient Clinic Therapist  [facility]

## 2024-08-26 ENCOUNTER — MYC MEDICAL ADVICE (OUTPATIENT)
Dept: OTOLARYNGOLOGY | Facility: CLINIC | Age: 49
End: 2024-08-26
Payer: MEDICARE

## 2024-08-26 DIAGNOSIS — Z98.890 HISTORY OF THYROPLASTY: Primary | ICD-10-CM

## 2024-08-26 NOTE — PROGRESS NOTES
Provider reviewed and signed forms for Patient Assistance Program via [employer].     Patient's complete Allergy and Medication list print and confirmed current.     Fax back: [phone]  See image below for Timestamp confirmation of Successful transmission.       Placed in Katie PAP file        Radha Estevez [facility]  Adult Endocrinology   [facility]

## 2024-08-27 ENCOUNTER — TELEPHONE (OUTPATIENT)
Dept: OTOLARYNGOLOGY | Facility: CLINIC | Age: 49
End: 2024-08-27
Payer: MEDICARE

## 2024-08-27 NOTE — TELEPHONE ENCOUNTER
[facility]    Phone Message    May a detailed message be left on voicemail: yes     Reason for Call: Other: Please review referral from Dr Jimenez, for  thyroplasty, notes indicate Dr Patino or Robin, but dx is not in protocols. Please contact pt for scheduling. [facility]  thanks     Action Taken: Other: ENT    Travel Screening: Not Applicable     Date of Service:

## 2024-08-27 NOTE — TELEPHONE ENCOUNTER
Patient confirmed scheduled appointment:  Date: 4/17/25  Time: 10:30am  Visit type: New Voice Panel  Provider: Dr. Patino  Location: [facility]  Testing/imaging:   Additional notes:

## 2024-08-31 DIAGNOSIS — I10 ESSENTIAL HYPERTENSION WITH GOAL BLOOD PRESSURE LESS THAN 140/90: ICD-10-CM

## 2024-09-02 RX ORDER — RAMIPRIL 10 MG/1
CAPSULE ORAL
Qty: 90 CAPSULE | Refills: 0 | Status: SHIPPED | OUTPATIENT
Start: 2024-09-02

## 2024-09-03 ENCOUNTER — TELEPHONE (OUTPATIENT)
Dept: NEUROSURGERY | Facility: CLINIC | Age: 49
End: 2024-09-03
Payer: MEDICARE

## 2024-09-03 NOTE — TELEPHONE ENCOUNTER
Called and spoke with Tito at length, discussed current symptoms/concerns. Advised to go to ED should he develop any bowel or bladder incontinence or persistent saddle anesthesia.   Asked Tito to let us know if would consider scheduling a proposed surgery.  Advised patient to call our clinic with any questions or concerns. Discussed red flag symptoms and advised to seek medical attention if these develop. Patient voiced understanding and agreement.   Lakisha Otoole RN, CNRN

## 2024-09-04 NOTE — TELEPHONE ENCOUNTER
Patient is scheduled for surgery on 9/25/24. I gave the patient surgery details, and he verbalized understanding.

## 2024-09-11 ENCOUNTER — TELEPHONE (OUTPATIENT)
Dept: ENDOCRINOLOGY | Facility: CLINIC | Age: 49
End: 2024-09-11
Payer: MEDICARE

## 2024-09-11 NOTE — PROGRESS NOTES
Writer received Patient assistance medication today in clinic.     Labeled and placed in secured medication fridge and voice mail left for patient that medication was ready for  and times staff would be on site.       Radha Estevez CMA  Adult Endocrinology   [facility]

## 2024-09-11 NOTE — TELEPHONE ENCOUNTER
[facility]    Phone Message    May a detailed message be left on voicemail: yes     Reason for Call: Other: Raiza calling today stating that there was notifcation someone with the name Tunde signed for patients Ozempic at the clinic today please advise. ID when you call [facility] [phone]      Action Taken: Message routed to:  [facility] ENDO    Travel Screening: Not Applicable     Date of Service:

## 2024-09-13 NOTE — TELEPHONE ENCOUNTER
Katie Nordisk informed medication has been received and clinic is waiting for the patient to .    Ksenia Hanson CMA  Adult Endocrinology  [facility] [place]

## 2024-09-15 ENCOUNTER — MYC REFILL (OUTPATIENT)
Dept: PALLIATIVE MEDICINE | Facility: OTHER | Age: 49
End: 2024-09-15
Payer: MEDICARE

## 2024-09-15 DIAGNOSIS — G89.29 CHRONIC INTRACTABLE PAIN: ICD-10-CM

## 2024-09-15 DIAGNOSIS — M51.369 DDD (DEGENERATIVE DISC DISEASE), LUMBAR: ICD-10-CM

## 2024-09-15 DIAGNOSIS — M45.8 ANKYLOSING SPONDYLITIS OF SACRAL REGION (H): ICD-10-CM

## 2024-09-15 DIAGNOSIS — M47.816 LUMBAR FACET ARTHROPATHY: ICD-10-CM

## 2024-09-15 DIAGNOSIS — G43.111 INTRACTABLE MIGRAINE WITH AURA WITH STATUS MIGRAINOSUS: ICD-10-CM

## 2024-09-15 DIAGNOSIS — G62.9 PERIPHERAL POLYNEUROPATHY: ICD-10-CM

## 2024-09-15 ASSESSMENT — PATIENT HEALTH QUESTIONNAIRE - PHQ9
SUM OF ALL RESPONSES TO PHQ QUESTIONS 1-9: 10
10. IF YOU CHECKED OFF ANY PROBLEMS, HOW DIFFICULT HAVE THESE PROBLEMS MADE IT FOR YOU TO DO YOUR WORK, TAKE CARE OF THINGS AT HOME, OR GET ALONG WITH OTHER PEOPLE: SOMEWHAT DIFFICULT
SUM OF ALL RESPONSES TO PHQ QUESTIONS 1-9: 10

## 2024-09-16 ENCOUNTER — OFFICE VISIT (OUTPATIENT)
Dept: FAMILY MEDICINE | Facility: CLINIC | Age: 49
End: 2024-09-16
Payer: MEDICARE

## 2024-09-16 VITALS
DIASTOLIC BLOOD PRESSURE: 76 MMHG | HEART RATE: 73 BPM | RESPIRATION RATE: 20 BRPM | BODY MASS INDEX: 35.07 KG/M2 | SYSTOLIC BLOOD PRESSURE: 110 MMHG | OXYGEN SATURATION: 98 % | TEMPERATURE: 98 F | HEIGHT: 76 IN | WEIGHT: 288 LBS

## 2024-09-16 DIAGNOSIS — M51.369 DDD (DEGENERATIVE DISC DISEASE), LUMBAR: ICD-10-CM

## 2024-09-16 DIAGNOSIS — G90.1 DYSAUTONOMIA (H): ICD-10-CM

## 2024-09-16 DIAGNOSIS — E08.42 DIABETIC POLYNEUROPATHY ASSOCIATED WITH DIABETES MELLITUS DUE TO UNDERLYING CONDITION (H): ICD-10-CM

## 2024-09-16 DIAGNOSIS — K21.9 GASTROESOPHAGEAL REFLUX DISEASE WITHOUT ESOPHAGITIS: ICD-10-CM

## 2024-09-16 DIAGNOSIS — Z86.718 PERSONAL HISTORY OF DVT (DEEP VEIN THROMBOSIS): ICD-10-CM

## 2024-09-16 DIAGNOSIS — M54.16 LUMBAR RADICULOPATHY: ICD-10-CM

## 2024-09-16 DIAGNOSIS — F11.90 CHRONIC, CONTINUOUS USE OF OPIOIDS: ICD-10-CM

## 2024-09-16 DIAGNOSIS — Z28.04: ICD-10-CM

## 2024-09-16 DIAGNOSIS — G90.A POTS (POSTURAL ORTHOSTATIC TACHYCARDIA SYNDROME): ICD-10-CM

## 2024-09-16 DIAGNOSIS — Z01.818 PREOP GENERAL PHYSICAL EXAM: Primary | ICD-10-CM

## 2024-09-16 DIAGNOSIS — I10 ESSENTIAL HYPERTENSION WITH GOAL BLOOD PRESSURE LESS THAN 140/90: ICD-10-CM

## 2024-09-16 DIAGNOSIS — E03.9 ACQUIRED HYPOTHYROIDISM: ICD-10-CM

## 2024-09-16 DIAGNOSIS — E66.01 SEVERE OBESITY (BMI 35.0-39.9) WITH COMORBIDITY (H): ICD-10-CM

## 2024-09-16 DIAGNOSIS — G47.33 OSA (OBSTRUCTIVE SLEEP APNEA): ICD-10-CM

## 2024-09-16 DIAGNOSIS — E11.42 TYPE 2 DIABETES MELLITUS WITH DIABETIC POLYNEUROPATHY, WITHOUT LONG-TERM CURRENT USE OF INSULIN (H): ICD-10-CM

## 2024-09-16 PROBLEM — F33.0 MAJOR DEPRESSIVE DISORDER, RECURRENT EPISODE, MILD (H): Status: ACTIVE | Noted: 2021-12-22

## 2024-09-16 LAB
ANION GAP SERPL CALCULATED.3IONS-SCNC: 10 MMOL/L (ref 7–15)
BUN SERPL-MCNC: 13.2 MG/DL (ref 6–20)
CALCIUM SERPL-MCNC: 10 MG/DL (ref 8.8–10.4)
CHLORIDE SERPL-SCNC: 103 MMOL/L (ref 98–107)
CREAT SERPL-MCNC: 1.14 MG/DL (ref 0.67–1.17)
EGFRCR SERPLBLD CKD-EPI 2021: 78 ML/MIN/1.73M2
GLUCOSE SERPL-MCNC: 98 MG/DL (ref 70–99)
HCO3 SERPL-SCNC: 25 MMOL/L (ref 22–29)
POTASSIUM SERPL-SCNC: 4.7 MMOL/L (ref 3.4–5.3)
SODIUM SERPL-SCNC: 138 MMOL/L (ref 135–145)
TSH SERPL DL<=0.005 MIU/L-ACNC: 2.64 UIU/ML (ref 0.3–4.2)

## 2024-09-16 PROCEDURE — G2211 COMPLEX E/M VISIT ADD ON: HCPCS | Performed by: FAMILY MEDICINE

## 2024-09-16 PROCEDURE — 80048 BASIC METABOLIC PNL TOTAL CA: CPT | Performed by: FAMILY MEDICINE

## 2024-09-16 PROCEDURE — 36415 COLL VENOUS BLD VENIPUNCTURE: CPT | Performed by: FAMILY MEDICINE

## 2024-09-16 PROCEDURE — 99214 OFFICE O/P EST MOD 30 MIN: CPT | Performed by: FAMILY MEDICINE

## 2024-09-16 PROCEDURE — 84443 ASSAY THYROID STIM HORMONE: CPT | Performed by: FAMILY MEDICINE

## 2024-09-16 RX ORDER — OXYCODONE HYDROCHLORIDE 5 MG/1
5 TABLET ORAL 2 TIMES DAILY PRN
Qty: 60 TABLET | Refills: 0 | Status: ON HOLD | OUTPATIENT
Start: 2024-09-16 | End: 2024-09-27

## 2024-09-16 ASSESSMENT — PAIN SCALES - GENERAL: PAINLEVEL: MODERATE PAIN (4)

## 2024-09-16 NOTE — TELEPHONE ENCOUNTER
Received call from patient requesting refill(s) of     OXYCODONE HCL 5 MG PO TABS  Last dispensed from pharmacy on: Aug. 22, 2024           Patient's last office/virtual visit by prescribing provider on: Aug. 6, 2024   Next office/virtual appointment scheduled for: Nov. 22, 2024       UDT: Apr. 8, 2024   CSA: Apr. 9, 2024           E-prescribe to   [facility] [id number] [place], [place] - [address] [address],    Will route to nursing [place] for review and preparation of prescription(s).

## 2024-09-16 NOTE — TELEPHONE ENCOUNTER
Tito CANSECO Pain Nurse11 hours ago (8:49 PM)       Refills have been requested for the following medications:         oxyCODONE (ROXICODONE) 5 MG tablet [Stacia Jack]      Patient Comment: 14 Tabs Remain. Reminder that I'm undergoing lumbar decompression surgery on Wednesday, Sep. 25.     Preferred pharmacy: [facility] [id number] [facility] [address] [address]

## 2024-09-16 NOTE — TELEPHONE ENCOUNTER
Medication refill information reviewed.     Patient Comment: 14 Tabs Remain. Reminder that I'm undergoing lumbar decompression surgery on Wednesday, Sep. 25.     Due date for OXYCODONE HCL 5 MG PO TABS  is 09/23/24     Prescriptions prepped for review.     Will route to provider.

## 2024-09-16 NOTE — PATIENT INSTRUCTIONS
At [facility], we strive to deliver an exceptional experience to you, every time we see you. If you receive a survey, please let us know what we are doing well and/or what we could improve upon, as we do value your feedback.  If you have MyChart, you can expect to receive results automatically within 24 hours of their completion.  Your provider will send a note interpreting your results as well.   If you do not have MyChart, you should receive your results in about a week by mail.    Your care team:                            Family Medicine Internal Medicine   MD Houston Frances, MD Cindy Sampson, MD Kanu Knapp, MD Letty Macedo, PALeydaC    Javier Cavazos, MD Pediatrics   Karrie Root, MD Rox Barton, MD Diana Kauffman, APRN CNP Radha Vega APRN CNP   Bettina Tejada, MD Humaira Eaton, MD Brianna Hernandez, CNP     Roscoe Vdiales, CNP Same-Day Provider (No follow-up visits)   JOCELYN Grider, DNP Shannen Dorado, JOCELYN Hauser, FNP, BC REDDY SherC     Clinic hours: Monday - Thursday 7 am-6 pm; Fridays 7 am-5 pm.   Urgent care: Monday - Friday 10 am- 8 pm; Saturday and Sunday 9 am-5 pm.    Clinic: [phone]       [facility]: Monday - Thursday 8 am - 7 pm; Friday 8 am - 6 pm  [facility]: [phone]     How to Take Your Medication Before Surgery  Preoperative Medication Instructions   Antiplatelet or Anticoagulation Medication Instructions   - Patient is on no antiplatelet or anticoagulation medications.    Additional Medication Instructions  Take all scheduled medications on the day of surgery EXCEPT for modifications listed below:  Embrel held   - GLP-1 Injectable (exenitide, liraglutide, semaglutide, dulaglutide, etc.): DO NOT TAKE 7 days before surgery        Patient Education   Preparing for Your Surgery  Getting started  A nurse will call you to  review your health history and instructions. They will give you an arrival time based on your scheduled surgery time. Please be ready to share:  Your doctor's clinic name and phone number  Your medical, surgical, and anesthesia history  A list of allergies and sensitivities  A list of medicines, including herbal treatments and over-the-counter drugs  Whether the patient has a legal guardian (ask how to send us the papers in advance)  Please tell us if you're pregnant--or if there's any chance you might be pregnant. Some surgeries may injure a fetus (unborn baby), so they require a pregnancy test. Surgeries that are safe for a fetus don't always need a test, and you can choose whether to have one.   If you have a child who's having surgery, please ask for a copy of Preparing for Your Child's Surgery.    Preparing for surgery  Within 10 to 30 days of surgery: Have a pre-op exam (sometimes called an H&P, or History and Physical). This can be done at a clinic or pre-operative center.  If you're having a , you may not need this exam. Talk to your care team.  At your pre-op exam, talk to your care team about all medicines you take. If you need to stop any medicines before surgery, ask when to start taking them again.  We do this for your safety. Many medicines can make you bleed too much during surgery. Some change how well surgery (anesthesia) drugs work.  Call your insurance company to let them know you're having surgery. (If you don't have insurance, call [phone].)  Call your clinic if there's any change in your health. This includes signs of a cold or flu (sore throat, runny nose, cough, rash, fever). It also includes a scrape or scratch near the surgery site.  If you have questions on the day of surgery, call your hospital or surgery center.  Eating and drinking guidelines  For your safety: Unless your surgeon tells you otherwise, follow the guidelines below.  Eat and drink as usual until 8 hours before  you arrive for surgery. After that, no food or milk.  Drink clear liquids until 2 hours before you arrive. These are liquids you can see through, like water, Gatorade, and Propel Water. They also include plain black coffee and tea (no cream or milk), candy, and breath mints. You can spit out gum when you arrive.  If you drink alcohol: Stop drinking it the night before surgery.  If your care team tells you to take medicine on the morning of surgery, it's okay to take it with a sip of water.  Preventing infection  Shower or bathe the night before and morning of your surgery. Follow the instructions your clinic gave you. (If no instructions, use regular soap.)  Don't shave or clip hair near your surgery site. We'll remove the hair if needed.  Don't smoke or vape the morning of surgery. You may chew nicotine gum up to 2 hours before surgery. A nicotine patch is okay.  Note: Some surgeries require you to completely quit smoking and nicotine. Check with your surgeon.  Your care team will make every effort to keep you safe from infection. We will:  Clean our hands often with soap and water (or an alcohol-based hand rub).  Clean the skin at your surgery site with a special soap that kills germs.  Give you a special gown to keep you warm. (Cold raises the risk of infection.)  Wear special hair covers, masks, gowns and gloves during surgery.  Give antibiotic medicine, if prescribed. Not all surgeries need antibiotics.  What to bring on the day of surgery  Photo ID and insurance card  Copy of your health care directive, if you have one  Glasses and hearing aids (bring cases)  You can't wear contacts during surgery  Inhaler and eye drops, if you use them (tell us about these when you arrive)  CPAP machine or breathing device, if you use them  A few personal items, if spending the night  If you have . . .  A pacemaker, ICD (cardiac defibrillator) or other implant: Bring the ID card.  An implanted stimulator: Bring the remote  control.  A legal guardian: Bring a copy of the certified (court-stamped) guardianship papers.  Please remove any jewelry, including body piercings. Leave jewelry and other valuables at home.  If you're going home the day of surgery  You must have a responsible adult drive you home. They should stay with you overnight as well.  If you don't have someone to stay with you, and you aren't safe to go home alone, we may keep you overnight. Insurance often won't pay for this.  After surgery  If it's hard to control your pain or you need more pain medicine, please call your surgeon's office.  Questions?   If you have any questions for your care team, list them here: _________________________________________________________________________________________________________________________________________________________________________ ____________________________________ ____________________________________ ____________________________________  For informational purposes only. Not to replace the advice of your health care provider. Copyright   2003, 2019 [facility]. All rights reserved. Clinically reviewed by Tamanna Juárez MD. [employer] [id number] - REV 12/22.

## 2024-09-16 NOTE — PROGRESS NOTES
Preoperative Evaluation  [address] [address]  Phone: [phone]  Primary Provider: Ksenia Lyles MD  Pre-op Performing Provider: Bubba Anderson MD  Sep 16, 2024             9/11/2024   Surgical Information   What procedure is being done? Lumbar Decompression (bilateral lumbar 4-lumbar 5 hemilaminectomies, medial facetectomies and foraminotomies with possible lumbar microdiscectomy and lumbar 5-sacral 1 foraminotomies)   Facility or Hospital where procedure/surgery will be performed: [facility]   Who is doing the procedure / surgery? Dr. Biggs   Date of surgery / procedure: 09/25/2024   Time of surgery / procedure: 9:00am   Where do you plan to recover after surgery? at home with family        Fax number for surgical facility: Note does not need to be faxed, will be available electronically in Epic.    Assessment & Plan     The proposed surgical procedure is considered INTERMEDIATE risk.    (Z01.818) Preop general physical exam  (primary encounter diagnosis)  (M51.36) DDD (degenerative disc disease), lumbar  (M54.16) Lumbar radiculopathy  Comment:   Plan:     One must be aware of the following diagnoses in the perioperative period. They increase hi srisk for medical complications, but they are stable and no changes need to be made at this time:    (Z86.718) Personal history of DVT (deep vein thrombosis)  (G47.33) LLOYD (obstructive sleep apnea)- mild (AHI 11)  (E66.01) Severe obesity (BMI 35.0-39.9) with comorbidity (H)  (K21.9) Gastroesophageal reflux disease without esophagitis  (F11.90) Chronic, continuous use of opioids  (G90.1) Dysautonomia (H)  (G90.A) POTS (postural orthostatic tachycardia syndrome)  Comment:   Plan:       (I10) Essential hypertension with goal blood pressure less than 140/90  Comment: well controlled   Plan: BASIC METABOLIC PANEL            (E11.8) Type 2 diabetes mellitus with complication,  without long-term current use of insulin (H)  (E08.42) Diabetic polyneuropathy associated with diabetes mellitus due to underlying condition (H)   Comment: well controlled, managed by his endocrinologist  Plan: BMP    (E03.9) Acquired hypothyroidism  Comment:   Plan: TSH            (Z28.04) Vaccination not carried out because of allergy to vaccine or component  Comment: influenza  Plan:             - No identified additional risk factors other than previously addressed        Recommendation  Approval given to proceed with proposed procedure, without further diagnostic evaluation.    Radha Francis is a 49 year old, presenting for the following:  Pre-Op Exam          9/16/2024     7:38 AM   Additional Questions   Roomed by Skylar   Accompanied by Self     HPI related to upcoming procedure: This 49 year old male complains of symptoms associated with his lumbar DDD: 6-12 months of foot, leg, waistline pain, some LE numbness with prolonged sitting        9/11/2024   Pre-Op Questionnaire   Have you ever had a heart attack or stroke? No   Have you ever had surgery on your heart or blood vessels, such as a stent placement, a coronary artery bypass, or surgery on an artery in your head, neck, heart, or legs? No   Do you have chest pain with activity? No   Do you have a history of heart failure? No   Do you currently have a cold, bronchitis or symptoms of other infection? No   Do you have a cough, shortness of breath, or wheezing? No   Do you or anyone in your family have previous history of blood clots? (!) YES Pt has hx DVT secondary to autoimmune disorders, siblings have has DVT and PE   Do you or does anyone in your family have a serious bleeding problem such as prolonged bleeding following surgeries or cuts? No   Have you ever had problems with anemia or been told to take iron pills? No   Have you had any abnormal blood loss such as black, tarry or bloody stools? No   Have you ever had a blood transfusion? No   Are  you willing to have a blood transfusion if it is medically needed before, during, or after your surgery? Yes   Have you or any of your relatives ever had problems with anesthesia? No   Do you have sleep apnea, excessive snoring or daytime drowsiness? (!) YES   Do you have a CPAP machine? Yes   Do you have any artifical heart valves or other implanted medical devices like a pacemaker, defibrillator, or continuous glucose monitor? No   Do you have artificial joints? No   Are you allergic to latex? (!) YES        Health Care Directive  Patient does not have a Health Care Directive or Living Will: Patient states has Advance Directive and will bring in a copy to clinic. (Is working on it now)    Preoperative Review of    reviewed - controlled substances reflected in medication list.      Status of Chronic Conditions:  See problem list for active medical problems.  Problems all longstanding and stable, except as noted/documented.  See ROS for pertinent symptoms related to these conditions.    Patient Active Problem List    Diagnosis Date Noted    Therapeutic opioid induced constipation 11/16/2023     Priority: Medium    Anal fissure 11/09/2023     Priority: Medium    Altered bowel function 11/09/2023     Priority: Medium    Generalized abdominal pain 11/09/2023     Priority: Medium    Nausea 11/09/2023     Priority: Medium    Perianal abscess 11/09/2023     Priority: Medium    Hyperlipidemia LDL goal <70 04/06/2023     Priority: Medium    RBD (REM behavioral disorder) 09/30/2022     Priority: Medium    Small fiber neuropathy 09/12/2022     Priority: Medium    Folliculitis 12/31/2021     Priority: Medium    Immunosuppression (H24) 12/31/2021     Priority: Medium    MDD (major depressive disorder), recurrent severe, without psychosis (H) 12/22/2021     Priority: Medium    Borderline personality disorder (H) 11/17/2021     Priority: Medium    Chronic pain syndrome 07/12/2021     Priority: Medium    Benign neoplasm of  cecum 09/17/2020     Priority: Medium    Benign neoplasm of ascending colon 09/15/2020     Priority: Medium    Dysautonomia (H) 08/18/2020     Priority: Medium    PHN (postherpetic neuralgia) 07/15/2020     Priority: Medium    POTS (postural orthostatic tachycardia syndrome) 03/24/2020     Priority: Medium    Orthostatic dizziness 03/18/2020     Priority: Medium    Gastroparesis 01/24/2020     Priority: Medium    Digestive symptom 01/07/2020     Priority: Medium    Hypertriglyceridemia 12/16/2019     Priority: Medium    Ingrown toenail 07/03/2019     Priority: Medium    History of pulmonary embolism 01/28/2019     Priority: Medium    Peripheral polyneuropathy 01/23/2019     Priority: Medium    Type 2 diabetes mellitus with complication, without long-term current use of insulin (H) 12/24/2018     Priority: Medium    DDD (degenerative disc disease), lumbar 11/13/2018     Priority: Medium    Morbid obesity (H) 05/17/2018     Priority: Medium    Fatty infiltration of liver 05/17/2018     Priority: Medium    Ankylosing spondylitis of sacral region (H) 02/28/2018     Priority: Medium    Chronic, continuous use of opioids 04/04/2017     Priority: Medium     Patient is followed by Ksenia Lyles MD for ongoing prescription of pain medication.  All refills should only be approved by this provider, or covering partner.    Medication(s): oxy 5.   Maximum quantity per month: 40  Clinic visit frequency required: Q3  months     Controlled substance agreement: 6/23/2020  UDS: Aug 2019    CSA -- Encounter Level on 04/04/2017:    Controlled Substance Agreement - Scan on 4/11/2017  1:30 PM: CONTROLLED SUBSTANCE AGREEMENT       CSA -- Patient Level:    Controlled Substance Agreement - Opioid - Scan on 8/9/2021  2:05 PM  Controlled Substance Agreement - Opioid - Scan on 6/23/2020  3:50 PM  Controlled Substance Agreement - Opioid - Scan on 6/12/2019  6:16 PM       Pain Clinic evaluation in the past: Yes    DIRE Total  Score(s):  No flowsheet data found.    Last [facility] website verification:  12/2/2020   [email or web address]       Internal hemorrhoids 11/03/2016     Priority: Medium    Essential hypertension with goal blood pressure less than 140/90 11/01/2016     Priority: Medium    Anorectal disorder 10/12/2016     Priority: Medium    B12 deficiency 08/29/2016     Priority: Medium    Irritable bowel syndrome with diarrhea 08/19/2016     Priority: Medium    Chronic midline low back pain without sciatica 06/06/2016     Priority: Medium    Hemorrhage of rectum and anus 03/16/2016     Priority: Medium    Acquired hypothyroidism 10/08/2015     Priority: Medium    Facet arthritis of cervical region 10/06/2015     Priority: Medium    Intracranial arachnoid cyst 10/02/2015     Priority: Medium    LLOYD (obstructive sleep apnea)- mild (AHI 11) 01/31/2015     Priority: Medium     Study Date: 1/27/2015- (308.1 lbs)  Snoring was reported assoft to moderate.  Lowest oxygen saturation was 88.0%. Apnea/Hypopnea Index was 11.5 events per hour. REM was not seen.  The supine AHI is 12.7.  RDI was  25.7. PLM index was 0 per hour.  Sleep study 10/31/16 [facility] (292#) CPAP 8 cm effective      Generalized anxiety disorder 01/12/2015     Priority: Medium    GERD (gastroesophageal reflux disease)      Priority: Medium    History of colitis 04/16/2013     Priority: Medium     Formatting of this note might be different from the original. Overview: 12/19/11, 9/15/12, 2/17/13, 4/12/13      Dysphagia 07/18/2011     Priority: Medium    Chronic nonallergic rhinitis      Priority: Medium     RAST all NEGATIVE for environmental allergens, IgE= 6 (normal)      Intermittent asthma      Priority: Medium     Exercise-induced      Diverticulosis 09/01/2006     Priority: Medium     Recurrent diverticulitis, S/p sigmoid resction 8/2013        Past Medical History:   Diagnosis Date    Acne     Acquired hypothyroidism     Allergic state     Ankylosing spondylitis  lumbar region (H)     Ankylosing spondylitis of sacral region (H)     Anxiety     Bipolar 2 disorder (H)     Chest pain     Chest pain, regulated w/BP meds. Clear arteries.    Chronic pain     DDD (degenerative disc disease), lumbar     Depressive disorder     Diabetes (H)     Diverticulosis     Facet arthritis of cervical region     Gastroesophageal reflux disease     Hypertension     IBS (irritable bowel syndrome)     Intracranial arachnoid cyst     Major depressive disorder, recurrent episode (H24)     Multiple psych providers - they manage meds August 2015: Provigil induced severe mood dis-function     Major depressive disorder, recurrent episode, severe (H) 12/2/2020    MDD (major depressive disorder), recurrent severe, without psychosis (H) 7/21/2021    Mixed dyslipidemia 4/6/2023    LLOYD (obstructive sleep apnea)- mild (AHI 11)     Polyneuropathy     Pulmonary embolism (H)     Skin exam, screening for cancer 12/3/2013    Sleep apnea     Uncomplicated asthma      Past Surgical History:   Procedure Laterality Date    BACK SURGERY  10/2007    lumbar discectomy L5-S1    BIOPSY  09/15/2020    Colon Adenomas x2    COLONOSCOPY      Note: colonoscopy scheduled with [facility] [place] on Friday, 9/4/15    COSMETIC SURGERY  2012    Nose Exterior - functional    GI SURGERY  08/2013    Sigmoidectomy    HERNIA REPAIR, UMBILICAL  08/23/2011    Dr. Evan whiting    INCISION AND DRAINAGE, ABSCESS, COMPLEX  08/23/2011    umbilical, Dr. Evan Beavers    LAPAROSCOPIC ASSISTED COLECTOMY LEFT (DESCENDING)  08/15/2013    Procedure: LAPAROSCOPIC ASSISTED COLECTOMY LEFT (DESCENDING);  Laparoscopic Hand Assisted Sigmoid Resection, Mobilization of Splenic Fissure, coloproctoscopy, *Latex Free Room* Anesthesia General with Pain block  ;  Surgeon: Aurora Justice MD;  Location: [facility] OR    NERVE SURGERY  08/18/2011    RF ablation @ L3-S1 @ [facility]    RECONSTRUCT NOSE AND SEPTUM (FUNCTIONAL)  10/14/2011    Procedure:RECONSTRUCT NOSE AND SEPTUM  "(FUNCTIONAL); Functional Septorhinoplasty, Turbinate Reduction, ; Surgeon:CEDRIC CUEVAS; Location:[facility] OR    SINUS SURGERY  10/01/2001    ethmoidectomy chronic sinusitis    SOFT TISSUE SURGERY  4/7/2022    Hidradenitis Suppurativa surgery     Current Outpatient Medications   Medication Sig Dispense Refill    acetaminophen 500 MG CAPS Take 500 mg by mouth 3 times daily      albuterol (PROAIR HFA/PROVENTIL HFA/VENTOLIN HFA) 108 (90 Base) MCG/ACT inhaler Inhale 2 puffs into the lungs every 6 hours as needed for shortness of breath, wheezing or cough 90 day supply 25.5 g 3    B-D LUER-LUCILLE SYRINGE 25G X 1\" 3 ML MISC USE WITH B12 INJECTIONS 12 each 0    bisacodyl (DULCOLAX) 5 MG EC tablet Take 10 mg by mouth as needed for constipation      buPROPion (WELLBUTRIN XL) 300 MG 24 hr tablet Take 1 tablet by mouth daily      cholecalciferol (D3-50) 1250 mcg (21285 units) capsule TAKE ONE CAPSULE BY MOUTH EVERY 2 WEEKS 24 capsule 0    clindamycin (CLEOCIN T) 1 % external lotion APPLY TWICE DAILY FOR FOLLICULITIS 60 mL 0    clonazePAM (KLONOPIN) 0.5 MG tablet Take 0.5 mg by mouth daily as needed for anxiety And 1mg at bedtime      cyanocobalamin (CYANOCOBALAMIN) 1000 MCG/ML injection INJECT 1 ML INTO THE MUSCLE EVERY 30 DAYS 10 mL 0    diphenhydrAMINE (BENADRYL) 25 MG capsule Take 25 mg by mouth once a week Before Enbrel injection      divalproex sodium delayed-release (DEPAKOTE) 125 MG DR tablet Take 125 mg by mouth 2 times daily      docusate sodium (COLACE) 50 MG capsule Take 100 mg by mouth 2 times daily      DULoxetine (CYMBALTA) 60 MG capsule Take 120 mg by mouth daily       EPINEPHrine (ANY BX GENERIC EQUIV) 0.3 MG/0.3ML injection 2-pack Inject 0.3 mLs (0.3 mg) into the muscle as needed for anaphylaxis May repeat one time in 5-15 minutes if response to initial dose is inadequate. 2 each 3    eszopiclone (LUNESTA) 3 MG tablet Take 3 mg by mouth At Bedtime       etanercept (ENBREL SURECLICK) 50 MG/ML autoinjector Inject 50 " "mg Subcutaneous once a week . Hold for signs of infection, and seek medical attention. 4 mL 7    famotidine (PEPCID) 20 MG tablet Take 20 mg by mouth daily      fenofibrate (TRICOR) 48 MG tablet TAKE ONE TABLET BY MOUTH ONCE DAILY 90 tablet 2    fexofenadine (ALLEGRA) 180 MG tablet Take 180 mg by mouth daily      fluticasone (FLONASE) 50 MCG/ACT nasal spray Spray 2 sprays in nostril daily      fluticasone-salmeterol (WIXELA INHUB) 100-50 MCG/ACT inhaler Inhale 1 puff into the lungs every 12 hours 3 each 3    guaiFENesin (MUCINEX) 600 MG 12 hr tablet Take 1 tablet (600 mg) by mouth daily      levothyroxine (SYNTHROID/LEVOTHROID) 75 MCG tablet TAKE ONE TABLET BY MOUTH EVERY MORNING 90 tablet 2    lidocaine (XYLOCAINE) 5 % external ointment Apply topically 2 times daily as needed for moderate pain 50 g 3    Melatonin 10 MG TABS tablet Take 10 mg by mouth at bedtime      methocarbamol (ROBAXIN) 500 MG tablet TAKE 1 - 2 TABLETS BY MOUTH 4 TIMES DAILY AS NEEDED FOR MUSCLE SPASMS 240 tablet 1    metoclopramide (REGLAN) 10 MG tablet Take 10 mg by mouth 4 times daily (before meals and nightly) Takes half a tab for gastroperisis      metoprolol succinate ER (TOPROL XL) 100 MG 24 hr tablet TAKE 1 TABLET BY MOUTH IN THE EVENING; TO BE USED WITH 200MG IN MORNING 90 tablet 2    metoprolol succinate ER (TOPROL XL) 200 MG 24 hr tablet TAKE 1 TABLET BY MOUTH TWICE DAILY 180 tablet 0    montelukast (SINGULAIR) 10 MG tablet TAKE ONE TABLET BY MOUTH EVERY EVENING 90 tablet 0    nabumetone (RELAFEN) 500 MG tablet Take 1-2 tablets (500-1,000 mg) by mouth 2 times daily as needed for moderate pain 120 tablet 1    naloxone (NARCAN) 4 MG/0.1ML nasal spray Spray 4 mg into one nostril alternating nostrils as needed for opioid reversal every 2-3 minutes until assistance arrives. This medication is an antidote and stays on the chart as an \"as needed\" medication      omega-3 acid ethyl esters (LOVAZA) 1 g capsule TAKE TWO CAPSULES BY MOUTH TWICE " DAILY 360 capsule 0    omeprazole (PRILOSEC) 20 MG DR capsule Take 20 mg by mouth as needed      OnabotulinumtoxinA (BOTOX IJ)       ondansetron (ZOFRAN ODT) 8 MG ODT tab Take 1 tablet (8 mg) by mouth every 8 hours as needed for nausea 20 tablet 0    oxyCODONE (ROXICODONE) 5 MG tablet Take 1 tablet (5 mg) by mouth 2 times daily as needed for breakthrough pain Ok to fill 08/22/24 and begin using on 08/24/24. #30 days supplyfor chronic pain 60 tablet 0    pregabalin (LYRICA) 150 MG capsule Take 1 capsule (150 mg) by mouth 3 times daily 270 capsule 1    pyridostigmine (MESTINON) 60 MG tablet Take 1 tablet by mouth 3 times daily      ramipril (ALTACE) 10 MG capsule TAKE ONE CAPSULE BY MOUTH ONCE DAILY 90 capsule 0    riboflavin 100 MG CAPS Take 200 mg by mouth daily Two hundred milligrams nightly to prevent migraine      risperiDONE (RISPERDAL) 0.5 MG tablet Take 0.5 mg by mouth 2 times daily as needed      rosuvastatin (CRESTOR) 40 MG tablet TAKE ONE TABLET BY MOUTH ONCE DAILY 90 tablet 2    Semaglutide, 1 MG/DOSE, (OZEMPIC) 4 MG/3ML pen Inject 1 mg Subcutaneous once a week 3 mL 11    sodium chloride 0.9 % neb solution Take 3 mLs by nebulization every 3 hours as needed      sodium fluoride 1.1 % CREA At Bedtime      triamcinolone (KENALOG) 0.1 % external cream Apply twice daily for up to 2 weeks per month on hands. 80 g 3    UBRELVY 100 MG tablet Take 100 mg by mouth at onset of headache      valACYclovir (VALTREX) 500 MG tablet Take 1 tablet (500 mg) by mouth daily 90 tablet 3    vitamin B complex with vitamin C (STRESS TAB) tablet Take 1 tablet by mouth daily      ZINC SULFATE-VITAMIN C MT Take 1 tablet by mouth daily      clonazePAM (KLONOPIN) 0.5 MG tablet Take 1 mg by mouth At Bedtime For RBD      olopatadine (PATADAY) 0.2 % ophthalmic solution Place 1 drop into both eyes daily 2.5 mL 3       Allergies   Allergen Reactions    Amoxicillin-Pot Clavulanate Difficulty breathing    Banana Shortness Of Breath     Pt  reports organic Banana is okay.     Clavulanic Acid Anaphylaxis     Other Reaction(s): Throat swelling    Nitroglycerin Palpitations    Penicillins Anaphylaxis    Provigil [Modafinil] Shortness Of Breath     headache    Gadolinium Hives and Itching     Patient was premedicated for the contrast allergy. He did still have a reaction a few hours after injection. Hives and itching. Dr. Gomez told tech to inform pt he should only have contrast again in the future when premedicated and at a hospital. Not at an outpatient facility.     Haemophilus B Polysaccharide Vaccine Rash     Quadrivalent, cell based    Influenza Virus Vaccine Rash     Quadrivalent, cell based    Ketoconazole      Topical cream caused swelling and itching    Dye [Contrast Dye] Other (See Comments) and Hives     Moderate flushing, CT contrast  Iodinated and gadolinium    Formoterol     Gabapentin      Other reaction(s): hives    Golimumab      Hives, bradycardia, face swelling    Mometasone     Naproxen      Other reaction(s): Bleeding Gums    Neurontin [Gabapentin] Hives     Moderate hives    Nortriptyline Hives    Nystatin Hives    Varicella Virus Vaccine Live      Rash    Adhesive Tape Rash    Baclofen Other (See Comments)     Cognitive changes    Flagyl [Metronidazole Hcl] Palpitations and Hives    Latex Rash    Metronidazole Palpitations, Other (See Comments) and Rash     dizziness (versus ciprofloxacin taken at same time)    Sulfamethizole Rash     Other Reaction(s): Rash        Social History     Tobacco Use    Smoking status: Never    Smokeless tobacco: Never   Substance Use Topics    Alcohol use: Not Currently     Comment: occ 1/week     Family History   Problem Relation Age of Onset    Musculoskeletal Disorder Mother         back    Anxiety Disorder Mother     Colon Polyps Mother     Ulcerative Colitis Mother         and ischemic small intestine, surgery    Hypertension Mother     Breast Cancer Mother     Osteoporosis Mother      "Diabetes Mother         Type 2, Diagnosed in 2014    Depression Mother         Takes Cymbalta to help with chronic pain + depx    Thyroid Disease Mother         Hypothyroidism    Obesity Mother         Under much better control latter half of 2015    Musculoskeletal Disorder Father         back    Substance Abuse Father         Alcohol    Hypertension Father     Hyperlipidemia Father     Depression Father         Off meds for many years. Seems \"ok\"    Anxiety Disorder Father     Substance Abuse Sister         Alcohol    Depression Sister         Mental Health Therapist, yet no anti-depressants?    Anxiety Disorder Sister         Mental Health Therapist, yet no anti-anxiety meds?    Deep Vein Thrombosis (DVT) Sister         acssociated with OCP use    Musculoskeletal Disorder Brother         back    Depression Brother         Expressed as anger and moodiness    Substance Abuse Brother         Alcohol    Anxiety Disorder Brother     Pulmonary Embolism Brother     Heart Disease Maternal Grandmother     Heart Disease Maternal Grandfather     Psychotic Disorder Paternal Grandfather     Suicide Paternal Grandfather     Depression Paternal Grandfather         Pediatrician. Committed suicide by pistol in 1990.    Bladder Cancer Other     Colon Cancer No family hx of     Crohn's Disease No family hx of     Anesthesia Reaction No family hx of     Skin Cancer No family hx of     Bleeding Disorder No family hx of      History   Drug Use No     Comment: synthetic THC for nausea               Objective    /76 (BP Location: Left arm, Patient Position: Chair, Cuff Size: Adult Large)   Pulse 73   Temp 98  F (36.7  C) (Temporal)   Resp 20   Ht 1.93 m (6' 4\")   Wt 130.6 kg (288 lb)   SpO2 98%   BMI 35.06 kg/m     Estimated body mass index is 35.06 kg/m  as calculated from the following:    Height as of this encounter: 1.93 m (6' 4\").    Weight as of this encounter: 130.6 kg (288 lb).  Physical Exam  GENERAL: alert and no " distress  EYES: Eyes grossly normal to inspection, PERRL and conjunctivae and sclerae normal  HENT: ear canals and TM's normal, nose and mouth without ulcers or lesions, hoarse  NECK: no adenopathy, no asymmetry, masses, or scars  RESP: lungs clear to auscultation - no rales, rhonchi or wheezes  CV: regular rate and rhythm, normal S1 S2, no S3 or S4, no murmur, click or rub, no peripheral edema  ABDOMEN: soft, nontender, no hepatosplenomegaly, no masses and bowel sounds normal  MS: no gross musculoskeletal defects noted, no edema  SKIN: no suspicious lesions or rashes  PSYCH: mentation appears normal, affect normal/bright    Recent Labs   Lab Test 06/24/24  1209 04/13/24  1409 12/12/23  0823   HGB  --  13.9 14.5   PLT  --  215 200   NA  --  139 142   POTASSIUM  --  4.0 4.3   CR  --  1.10 1.08   A1C 5.5  --   --       Lab Results   Component Value Date    A1C 5.5 06/24/2024    A1C 5.7 02/15/2023    A1C 6.4 09/23/2022    A1C 6.3 02/10/2022    A1C 6.0 08/19/2021    A1C 6.5 02/10/2021    A1C 6.0 10/16/2020    A1C 6.1 08/18/2020    A1C 6.0 01/24/2020    A1C 5.9 09/13/2019      Diagnostics  Labs pending at this time.  Results will be reviewed when available.   No EKG required, no history of coronary heart disease, significant arrhythmia, peripheral arterial disease or other structural heart disease.    Revised Cardiac Risk Index (RCRI)  The patient has the following serious cardiovascular risks for perioperative complications:   - No serious cardiac risks = 0 points     RCRI Interpretation: 0 points: Class I (very low risk - 0.4% complication rate)         Signed Electronically by: Bubba Anderson MD  A copy of this evaluation report is provided to the requesting physician.         Answers submitted by the patient for this visit:  Patient Health Questionnaire (Submitted on 9/15/2024)  If you checked off any problems, how difficult have these problems made it for you to do your work, take care of things at home, or get  along with other people?: Somewhat difficult  PHQ9 TOTAL SCORE: 10

## 2024-09-16 NOTE — PROGRESS NOTES
Patient presented to clinic today and picked up 4 boxes of Ozempic from the Patient Assistance program.         Patient placed medication in cooler bag.     Radha Estevez CMA  Adult Endocrinology   [facility]

## 2024-09-18 ENCOUNTER — VIRTUAL VISIT (OUTPATIENT)
Dept: NEUROSURGERY | Facility: CLINIC | Age: 49
End: 2024-09-18
Payer: MEDICARE

## 2024-09-18 DIAGNOSIS — M54.16 LUMBAR RADICULOPATHY: Primary | ICD-10-CM

## 2024-09-18 PROCEDURE — 99207 PR NO CHARGE NURSE ONLY: CPT | Mod: 93

## 2024-09-18 NOTE — PROGRESS NOTES
Called patient, discussed surgery, post-op course, expectations, follow up plan.    Reviewed H&P from 9/16/24 - approval given  Labs - 9/9/24, 9/7/24 within acceptable limits  EKG - not indicated per H&P    MRI done on 7/17/24 - in [place]    To OR as planned.     Procedure - bilateral lumbar 4-lumbar 5 hemilaminectomies, medial facetectomies and foraminotomies with possible lumbar microdiscectomy and lumbar 5-sacral 1 foraminotomies     Hospital - [facility]    Surgeon - Dr. Biggs    Date of surgery - 9/25/24    Start time - 10:55 AM    Check in time - 8:55 AM    Reviewed NPO instructions:  Do not eat for 8 hours before surgery.  You can drink clear liquids up until 2 hours before surgery.     Reviewed with patient: Arrive 2 hours prior to scheduled surgery.    Continue to refrain from NSAIDs, herbal remedies, supplements, anti-coagulants, and blood thinners.     ETANERCEPT (ENBREL SURECLICK) - should be held 2 weeks prior to surgery - patient has not had for several weeks  RAMIPRIL (ALTACE) - should be held 24 hours prior to surgery - patient verbalized understanding  FENOFIBRATE (TRICOR) - should be held on day of surgery - patient verbalized understanding  NABUMETONE (RELAFEN) - should be held 10 days before surgery - patient has been holding for 10 days before surgery  SEMAGLUTIDE (OZEMPIC) - should be held 7 days before surgery - patient has not had for several weeks, states he still has gastroparesis    Patient confirmed they have help/assistance in place at home upon discharge    Instructions: using a washcloth and a bottle of provided Hibiclens, wash your body, avoiding your face and genitals. Preferably, shower the night before surgery and the morning of surgery using a half a bottle each time for your whole body shower.    Patient was informed that we will provide up to 6 weeks prescription of pain medication for post-operative pain.    Instructed patient about the following: After your surgery, if you  will be staying in-patient, a nursing provider team will be monitoring you closely throughout your stay and communicate your health status to your surgeon and other providers.  You will be seen by Advanced Practice Providers (e.g., nurse practitioners, clinic nurse specialist, and physician assistants) who will check on you regularly to assess the status of your surgery.

## 2024-09-19 ENCOUNTER — VIRTUAL VISIT (OUTPATIENT)
Dept: PALLIATIVE MEDICINE | Facility: CLINIC | Age: 49
End: 2024-09-19
Payer: MEDICARE

## 2024-09-19 DIAGNOSIS — G43.111 INTRACTABLE MIGRAINE WITH AURA WITH STATUS MIGRAINOSUS: ICD-10-CM

## 2024-09-19 DIAGNOSIS — G62.9 PERIPHERAL POLYNEUROPATHY: ICD-10-CM

## 2024-09-19 DIAGNOSIS — M47.817 LUMBOSACRAL SPONDYLOSIS WITHOUT MYELOPATHY: ICD-10-CM

## 2024-09-19 DIAGNOSIS — M79.7 FIBROMYALGIA: ICD-10-CM

## 2024-09-19 DIAGNOSIS — G57.13 MERALGIA PARESTHETICA OF BOTH LOWER EXTREMITIES: ICD-10-CM

## 2024-09-19 DIAGNOSIS — G89.29 CHRONIC INTRACTABLE PAIN: Primary | ICD-10-CM

## 2024-09-19 PROCEDURE — 96158 HLTH BHV IVNTJ INDIV 1ST 30: CPT | Mod: 95 | Performed by: PSYCHOLOGIST

## 2024-09-19 PROCEDURE — 96159 HLTH BHV IVNTJ INDIV EA ADDL: CPT | Mod: 95 | Performed by: PSYCHOLOGIST

## 2024-09-19 NOTE — PROGRESS NOTES
Joel Pineda is a 49 year old male who is being evaluated via a billable video visit.      Patient is currently in the [facility]? yes    Patient would like the video invitation sent by: Text to cell phone: [phone]}    Video Start Time: 3:59 PM  Video Stop Time: 5:00 PM    Additional provider notes:     Pain Diagnoses per pain provider:    Chronic intractable pain     Fibromyalgia     Intractable migraine with aura with status migrainosus     Lumbosacral spondylosis without myelopathy     Meralgia paresthetica of both lower extremities     Peripheral polyneuropathy                DATA: During today's visit you reported the following: Your chronic pain is high - did fall down stairs on your butt since last visit, unable to use NSAIDS right now in anticipation of surgery. Your mood is more anxious due to surgery. Your activity level is unchanged. Your stress level is mildly increased - worry thoughts about surgery and post-operative pain not being managed. Your sleep is unchanged. You reported engaging in self-care for your pain 2-3 times daily.    You identified that you would like to focus on the following or had questions regarding the following issues or concerns, and we discussed the following:   - lumbar discectomy surgery next week  - discussed cultivating a set of activities to keep you occupied as you recover from surgery  - wonder about needing to go to mental health unit as this occurred in the past when pain was not managed well, led to psychosis in the form of auditory hallucinations  - explored coping ahead for post-surgery in terms of mental health - call surgeon and/or JOCELYN Zavala CNP - [facility] through [facility]    ASSESSMENT: Discussed focusing on self-care strategies to manage both anticipatory anxiety related to surgery and post-operative pain, as well as identified specific strategies to cope ahead if his pain is feeling unmanaged or he experiences worsening or new  mental health symptoms.     PLAN:   Your next appointment is scheduled for 10/3 at 2:00 PM.  Assignment/Objectives /interventions for next session:   - use cope ahead  - continue to focus on self-care    We believe regular attendance is key to your success in our program!    Any time you are unable to keep your appointment we ask that you call us at [phone] at least 24 hours in advance to cancel.This will allow us to offer the appointment time to another patient.   Multiple missed appointments may lead to dismissal from the clinic.    Telemedicine Visit: The patient's condition can be safely assessed and treated via synchronous audio and visual telemedicine encounter.      Reason for Telemedicine Visit: Services only offered telehealth    Originating Site (Patient Location): Patient's home    Distant Site (Provider Location): Provider Remote Setting- Home Office    Consent:  The patient/guardian has verbally consented to: the potential risks and benefits of telemedicine (video visit) versus in person care; bill my insurance or make self-payment for services provided; and responsibility for payment of non-covered services.     Mode of Communication:  Video Conference via [employer]Stream    As the provider I attest to compliance with applicable laws and regulations related to telemedicine.      Shirley Bartlett PsyD LP  Licensed Psychologist  Outpatient Clinic Therapist  [facility]

## 2024-09-20 ENCOUNTER — VIRTUAL VISIT (OUTPATIENT)
Dept: PALLIATIVE MEDICINE | Facility: OTHER | Age: 49
End: 2024-09-20
Payer: MEDICARE

## 2024-09-20 DIAGNOSIS — M47.16 LUMBAR SPONDYLOSIS WITH MYELOPATHY: ICD-10-CM

## 2024-09-20 DIAGNOSIS — M51.369 DDD (DEGENERATIVE DISC DISEASE), LUMBAR: ICD-10-CM

## 2024-09-20 DIAGNOSIS — G89.29 CHRONIC INTRACTABLE PAIN: Primary | ICD-10-CM

## 2024-09-20 PROCEDURE — 99214 OFFICE O/P EST MOD 30 MIN: CPT | Mod: 95 | Performed by: NURSE PRACTITIONER

## 2024-09-20 PROCEDURE — G2211 COMPLEX E/M VISIT ADD ON: HCPCS | Mod: 95 | Performed by: NURSE PRACTITIONER

## 2024-09-20 ASSESSMENT — PAIN SCALES - PAIN ENJOYMENT GENERAL ACTIVITY SCALE (PEG)
INTERFERED_ENJOYMENT_LIFE: 6
PEG_TOTALSCORE: 5.67
INTERFERED_GENERAL_ACTIVITY: 6
AVG_PAIN_PASTWEEK: 5

## 2024-09-20 ASSESSMENT — PAIN SCALES - GENERAL: PAINLEVEL: MODERATE PAIN (5)

## 2024-09-20 NOTE — PATIENT INSTRUCTIONS
After Visit Instructions:     Thank you for coming to [facility] for your care. It is my goal to partner with you to help you reach your optimal state of health.   Continue daily self-care, identifying contributing factors, and monitoring variations in pain level. Continue to integrate self-care into your life.      Health Psychology: YES, Continue per your psychologist's recommendations.  Physical therapy: YES, You may or may not do PT after surgery.   30 minute Clinic follow-up with JOCELYN Zavala NP-C on 10/28/2024 at 8:00 am   Medication Management :   Continue current medications until surgery.   Stacia will send the Post Operative Pain Medication policy to Dr Biggs and give the inpatient pain management team a head's up.       JOCELYN Padgett NP-C  [facility] - Monday, Thursday and Friday  [place] [facility] - Tuesday      Be sure to request ALL medication refills 5 days prior to the due date whether or not you will see your medical provider in an appointment before the due date.      Do not expect same day refills. If you do not plan ahead you may run out of medications.     Early refills are not provided.  It is your responsibility to manage your medications responsibility and keep them safely stored. Lost or destroyed medications WILL NOT be replaced    Scheduling/Clinic telephone Number for ALL locations:  [phone]    After Hours On-Call Service:  [phone]    Call with any questions about your care and for scheduling assistance.   Calls are returned Monday through Friday between 8 AM and 4:00 PM. We usually get back to you within 2 business days depending on the issue/request.    If we are prescribing your medications:  For opioid medication refills, call the clinic or send a Fotofeedback message 7 days in advance.  Please include:  Your name and date of birth.   Name of requested medication  Name of the pharmacy.  For non-opioid  medications, call your pharmacy directly to request a refill. Please allow 3-4 days to be processed.   Per [place] State Law:  All controlled substance prescriptions must be filled within 30 days of being written.    For those controlled substances allowing refills, pickup must occur within 30 days of last fill.      We believe regular attendance is key to your success in our program!    Any time you are unable to keep your appointment we ask that you call us at least 24 hours in advance to cancel.This will allow us to offer the appointment time to another patient.   Multiple missed appointments may lead to dismissal from the clinic.

## 2024-09-20 NOTE — Clinical Note
Please see my notes about Tito's concern about post op pain and his previous experience in 2007.  I reviewed appropriate expectations and let him know that I would reach out to Dr Biggs and the inpatient Pain Team. Please see the post op pain medication recommendation. I have a follow up with Tito 4 weeks after surgery.  Thanks! Stacia  Cell: [phone]

## 2024-09-20 NOTE — PROGRESS NOTES
"  [facility]      Joel Pineda is a 49 year old male who is being evaluated via a billable virtual visit.      VIDEO VISIT   How would you like to obtain your AVS? MyChart  If you are dropped from the video visit, the video invite should be resent to: Text to cell phone: [phone]  Will anyone else be joining your video visit? NO,  Is patient CURRENTLY in [place]? YES  If patient encounters technical issues they should call [phone]    Video-Visit Details  Type of service:  Video Visit  Video Start Time: 11:07 AM  Video End Time: 11:24 AM  Total Face to Face Time: 17 minutes   Originating Location (pt. Location): Home  Distant Location (provider location):  [facility]   Platform used for Video Visit: Sophia      CHIEF COMPLAINT: Chronic pain    INTERVAL HISTORY:  Last seen on 7/25/2024.        Recommendations/plan at the last visit included:  Health Psychology: YES  Continue per Shirley's recommendations  Physical therapy: Continue Yoga, home exercise program  30 minutes Video or Clinic follow-up with JOCELYN Zavala NP-C in 2 months or sooner . Ok to schedule up to three follow up appts at a time, alternating clinic and video visits.   Procedures recommended: Stacia will look into order TPIs with steroids  Medication Management :   Continue current medications.        Since last visit:   - Scheduled for Procedure - bilateral lumbar 4-lumbar 5 hemilaminectomies, medial facetectomies and foraminotomies with possible lumbar microdiscectomy and lumbar 5-sacral 1 foraminotomies with Dr Biggs at [facility] on 9/25/24  - Tito is concerned about post op pain control due to a \"horrible\" experience after a previous back surgery several years ago. His pain is so poorly controlled that it caused a mental health breakdown. Described a \"psychosis\" that occurred post op in 2007 form uncontrolled pain.       Pain Information Today: Score: Moderate " Pain (5)/10. Location of pain:     Annual Requirements last collected:  4/8/24     Current Pain Relevant Medications:    Acetaminophen 500 mg BID & with Oxycodone.   Cymbalta 120 mg in AM  Lyrica 300 MG BID   Methocarbamol 500 mg 1-2 QID PRN  Nabumetone 500 mg 1-2 times a day  Lidocaine gel topically 2-3 times daily  Risperdal 0.5 mg 1-2 x daily      Pain Related Controlled Substances:   Clonazepam 0.5 mg, 2 tabs at HS.  Lunesta 3 mg  Oxycodone 5 mg 1-2 tabs per day PRN              Total opiate dose: 7.5-15 MME     Previous Pain Relevant Medications: (H--helped; HI--Helped initially; [facility]--Somewhat helpful; NH--No help; W--worse; SE--side effects; ?--Unsure if helpful)   NOTE: This medication information taken from patient's intake form, not medical records.   Opiates: Oxycodone: H, Tramadol:[facility]. SE, Codeine: [place]  NSAIDS: Celebrex: [facility], Nabumetone:H, Naproxen: SE  Anti-migraine medications: Midrin? , Maxalt:H  Muscle Relaxants: Baclofen:[facility], Flexeril:H, Tizaidine:?, Methocarbamol:?  Neuropathics: Lyrica:H  Anti-depressants: Abilify:SE, Brintellix: NH, Wellbutrin: ?, Cymbalta: NH, Nortriptyline: NH, Seroquel:   [facility], Risperdal: [place], Effexor:?, Cymbalta ?  Anxiety medications: Xanax: H, Klonpin: H, lorazepam: H      Topicals: Lidocaine:H  Sleep medications:Belsomra: NH, Melatonin:H, Trazodone NH, Ambien:NH  Other medications not covered above: Humira: All, Enbrel; H, dicyclomine:H, Medrol:[facility], Prednisone:H     Any illicit drug use: denies   EtOH use: none   Caffeine use: 6-8 per day   Nicotine use: None     Past Pain Treatments:   Pain Clinic:   Yes  [facility] pain management: For spinal injections with Dr Diaz, [facility]: injections, nerve ablation, [facility] for SCS treatment.  Did trial but did not find it beneficial.   [facility] chronic pain program.    PT: Yes  For other reasons. Neck, back and feet  Psychologist: Yes ongoing with private therapist.at  [facility].   Chiropractor: Yes years ag              Acupuncture:  Yes [place]  Pharmacotherapy:  Opioids: Yes  Non-opioids:    Yes   TENs Unit:Yes H for back   Injections: Yes Many for neck and back over the years.      Surgeries related to pain: Yes   October 2007 L5-S1 laminectomy, micro discectomy          THE 4 As OF OPIOID MAINTENANCE ANALGESIA    Analgesia: Is pain relief clinically significant? YES   Activity: Is patient functional and able to perform Activities of Daily Living? YES   Adverse effects: Is patient free from adverse side effects from opiates? YES   Adherence to Rx protocol: Is patient adhering to Controlled Substance Agreement and taking medications ONLY as ordered? YES     Is Narcan prescribed for opiate use >50 MME daily or concurrent use of opiates and benzodiazepines?  Yes    [place] Board  Pharmacy Data Base Reviewed:    YES; As expected, no concern for misuse/abuse of controlled medications based on this report. Reviewed [facility] September 20, 2024- no concerning fills.    _______________________________________________      Physical Exam and Vital Signs not completed due to virtual visit.     General: Verbal, alert and no distress   Psychiatric:  affect and mood normal, mentation appears normal.     DIRE Score for ongoing opioid management is calculated as follows:    Diagnosis = 3 pts (advanced condition; severe pain/objective findings)    Intractability = 3 pts (patient fully engaged but inadequate response to treatments)    Risk        Psych = 3 pts (no significant personality dysfunction/mental illness; good communication with clinic)         Chem Hlth = 3 pts (no history of chemical dependency; not drug-focused)       Reliability = 2 pts (occasional difficulties with compliance; generally reliable)       Social = 2 pts (reduction in some relationships/life rolls)       (Psych + Chem hlth + Reliability + Social) = 16    Efficacy = 2 pts (moderate benefit/function; low med dose; too early/not tried meds)    DIRE Score = 18        7-13: likely NOT  suitable candidate for long-term opioid analgesia       14-21: may be a suitable candidate for long-term opioid analgesia     Previous Diagnostic Tests:   Imaging Studies:      7/17/2024: MR LUMBAR SPINE W/O CONTRAST   IMPRESSION: Interval progression of multilevel lumbar spondylosis, most pronounced at L4-L5 and L5-S1, when compared to 6/27/2019. There is moderate to severe left neuroforaminal stenosis at L4-L5 with  abutment of the exiting left L4 nerve. Additional moderate to severe  bilateral neuroforaminal stenosis with abutment of the exiting  bilateral L5 nerves at L5-S1 and abutment of the descending left S1  nerve root.         PAIN RELEVANT CONDITIONS:   1.  Ankylosing spondylosis, Lumbar DDD with left S1 nerve involvement, lumbar stenosis  2.  Chronic migraine headaches  3.  Chronic cervical pain, DDD  4.  Peripheral small fiber neuropathy    ASSESSMENT AND PLAN    (G89.29) Chronic intractable pain  (primary encounter diagnosis)  (M47.16) Lumbar spondylosis with myelopathy  (M51.36) DDD (degenerative disc disease), lumbar  Comment: Tito expresses concern about post op pain management. As noted above, he had an episode of psychosis following a lumbar surgery in 2007 that was triggered by severe uncontrolled, under treated acute pain. I reviewed expectations. What he should ask for and from whom. Also assured him that my patients who have had surgery with Dr Biggs have done very well with post op pain. I let him know that I will reach out to the inpatient pain management team to let them know his concerns. Explained that they may not have orders for medications right away when he gets to the floor post op and that he may ask for a dose of pain medication right before he goes up if needed.   Plan: Continue current medications. Follow up with DV 4 weeks after surgery as scheduled.        PATIENT INSTRUCTIONS:     Diagnosis reviewed, treatment option addressed, and risk/benefits discussed.  Self-care  instructions given.  I am recommending a multidisciplinary treatment plan to help this patient better manage pain.    Remember to request ALL medication refills 5-7 BUSINESS days before you run out.     Health Psychology: YES, Continue per your psychologist's recommendations.  Physical therapy: YES, You may or may not do PT after surgery.   30 minute Clinic follow-up with JOCELYN Zavala NP-C on 10/28/2024 at 8:00 am   Medication Management :   Continue current medications until surgery.   Stacia will send the Post Operative Pain Medication policy to Dr Biggs and give the inpatient pain management team a head's up.     POST PROCEDURE PAIN MANAGEMENT MEDICATIONS:   [facility] does not prescribe post procedure opiate pain medications.     In general, patients who take opiates daily may require up to 50% higher than average dose to control post op pain as compared to an opiate naive patient. They may also require taking additional post operative pain medication up to 50% longer than an opiate naive patient.     If we have concerns about a specific patient in regards to compliance or safety in opiate use, it will be clearly documented in the chart. We may also reach out to you with specific concerns regarding an patient.     To avoid insurance coverage problems, we highly recommend that you prescribe something other that the immediate release opiate that the patient uses for their chronic pain. For example, if the patient takes Oxycodone 5 mg for chronic pain, order Percocet 5/325 mg or Hydrocodone/APAP 7.5 mg. If the patient cannot use Tylenol, the Dilaudid 2 mg is a good option.     If the post operative opiate brings their daily opiate dose over 50 MME/day, please order Narcan and show the patient how it is to be used. We are also always happy to have a post op medication consultation with our patients to review safety concerns and order the Narcan.     [facility] remain committed to collaborating with our colleagues in in all service lines, most especially with our surgical colleagues to be sure our mutual patients have the safest opportunity for post operative pain relief and recovery.    Please reach out to us with any questions regarding post operative pain management or dosing. The clinic number is [phone] or you can reach any of us via staff messenger.     Thank you,     JOCELYN Padgett NP-C [facility] [place]        I have reviewed the note as documented above.  This accurately captures the substance of my conversation with the patient.    BILLING TIME DOCUMENTATION:   TOTAL TIME on the date of service includes:   Time spent preparing to see the patient: 5 minutes (reviewing records and tests)  Time spend face to face with the patient: 17 minutes  Time spent ordering tests, medications, procedures and referrals: 0 minutes  Time spent Referring and communicating with other healthcare professionals: 3 minutes  Documenting clinical information in Epic: 5 minutes    The total TIME spent on this patient on the day of the appointment was 30 minutes.     JOCELYN Padgett NP-C [facility] [place]    (Information in italics and blue color are taken from previous pain and consulting medical providers notes and are documented as such)

## 2024-09-20 NOTE — H&P (VIEW-ONLY)
"  [facility]      Joel Pineda is a 49 year old male who is being evaluated via a billable virtual visit.      VIDEO VISIT   How would you like to obtain your AVS? MyChart  If you are dropped from the video visit, the video invite should be resent to: Text to cell phone: [phone]  Will anyone else be joining your video visit? NO,  Is patient CURRENTLY in [place]? YES  If patient encounters technical issues they should call [phone]    Video-Visit Details  Type of service:  Video Visit  Video Start Time: 11:07 AM  Video End Time: 11:24 AM  Total Face to Face Time: 17 minutes   Originating Location (pt. Location): Home  Distant Location (provider location):  [facility]   Platform used for Video Visit: Sophia      CHIEF COMPLAINT: Chronic pain    INTERVAL HISTORY:  Last seen on 7/25/2024.        Recommendations/plan at the last visit included:  Health Psychology: YES  Continue per Shirley's recommendations  Physical therapy: Continue Yoga, home exercise program  30 minutes Video or Clinic follow-up with JOCELYN Zavala NP-C in 2 months or sooner . Ok to schedule up to three follow up appts at a time, alternating clinic and video visits.   Procedures recommended: Stacia will look into order TPIs with steroids  Medication Management :   Continue current medications.        Since last visit:   - Scheduled for Procedure - bilateral lumbar 4-lumbar 5 hemilaminectomies, medial facetectomies and foraminotomies with possible lumbar microdiscectomy and lumbar 5-sacral 1 foraminotomies with Dr Biggs at [facility] on 9/25/24  - Tito is concerned about post op pain control due to a \"horrible\" experience after a previous back surgery several years ago. His pain is so poorly controlled that it caused a mental health breakdown. Described a \"psychosis\" that occurred post op in 2007 form uncontrolled pain.       Pain Information Today: Score: Moderate " Pain (5)/10. Location of pain:     Annual Requirements last collected:  4/8/24     Current Pain Relevant Medications:    Acetaminophen 500 mg BID & with Oxycodone.   Cymbalta 120 mg in AM  Lyrica 300 MG BID   Methocarbamol 500 mg 1-2 QID PRN  Nabumetone 500 mg 1-2 times a day  Lidocaine gel topically 2-3 times daily  Risperdal 0.5 mg 1-2 x daily      Pain Related Controlled Substances:   Clonazepam 0.5 mg, 2 tabs at HS.  Lunesta 3 mg  Oxycodone 5 mg 1-2 tabs per day PRN              Total opiate dose: 7.5-15 MME     Previous Pain Relevant Medications: (H--helped; HI--Helped initially; [facility]--Somewhat helpful; NH--No help; W--worse; SE--side effects; ?--Unsure if helpful)   NOTE: This medication information taken from patient's intake form, not medical records.   Opiates: Oxycodone: H, Tramadol:[facility]. SE, Codeine: NH  NSAIDS: Celebrex: [facility], Nabumetone:H, Naproxen: SE  Anti-migraine medications: Midrin? , Maxalt:H  Muscle Relaxants: Baclofen:[facility], Flexeril:H, Tizaidine:?, Methocarbamol:?  Neuropathics: Lyrica:H  Anti-depressants: Abilify:SE, Brintellix: NH, Wellbutrin: ?, Cymbalta: NH, Nortriptyline: NH, Seroquel:   [facility], Risperdal: NH, Effexor:?, Cymbalta ?  Anxiety medications: Xanax: H, Klonpin: H, lorazepam: H      Topicals: Lidocaine:H  Sleep medications:Belsomra: NH, Melatonin:H, Trazodone NH, Ambien:NH  Other medications not covered above: Humira: All, Enbrel; H, dicyclomine:H, Medrol:[facility], Prednisone:H     Any illicit drug use: denies   EtOH use: none   Caffeine use: 6-8 per day   Nicotine use: None     Past Pain Treatments:   Pain Clinic:   Yes  [facility] pain management: For spinal injections with Dr Diaz, [facility]: injections, nerve ablation, [facility] for SCS treatment.  Did trial but did not find it beneficial.   [facility] chronic pain program.    PT: Yes  For other reasons. Neck, back and feet  Psychologist: Yes ongoing with private therapist.at  [facility].   Chiropractor: Yes years ag              Acupuncture:  Yes NH  Pharmacotherapy:  Opioids: Yes  Non-opioids:    Yes   TENs Unit:Yes H for back   Injections: Yes Many for neck and back over the years.      Surgeries related to pain: Yes   October 2007 L5-S1 laminectomy, micro discectomy          THE 4 As OF OPIOID MAINTENANCE ANALGESIA    Analgesia: Is pain relief clinically significant? YES   Activity: Is patient functional and able to perform Activities of Daily Living? YES   Adverse effects: Is patient free from adverse side effects from opiates? YES   Adherence to Rx protocol: Is patient adhering to Controlled Substance Agreement and taking medications ONLY as ordered? YES     Is Narcan prescribed for opiate use >50 MME daily or concurrent use of opiates and benzodiazepines?  Yes    [place] Board  Pharmacy Data Base Reviewed:    YES; As expected, no concern for misuse/abuse of controlled medications based on this report. Reviewed [facility] September 20, 2024- no concerning fills.    _______________________________________________      Physical Exam and Vital Signs not completed due to virtual visit.     General: Verbal, alert and no distress   Psychiatric:  affect and mood normal, mentation appears normal.     DIRE Score for ongoing opioid management is calculated as follows:    Diagnosis = 3 pts (advanced condition; severe pain/objective findings)    Intractability = 3 pts (patient fully engaged but inadequate response to treatments)    Risk        Psych = 3 pts (no significant personality dysfunction/mental illness; good communication with clinic)         Chem Hlth = 3 pts (no history of chemical dependency; not drug-focused)       Reliability = 2 pts (occasional difficulties with compliance; generally reliable)       Social = 2 pts (reduction in some relationships/life rolls)       (Psych + Chem hlth + Reliability + Social) = 16    Efficacy = 2 pts (moderate benefit/function; low med dose; too early/not tried meds)    DIRE Score = 18        7-13: likely NOT  suitable candidate for long-term opioid analgesia       14-21: may be a suitable candidate for long-term opioid analgesia     Previous Diagnostic Tests:   Imaging Studies:      7/17/2024: MR LUMBAR SPINE W/O CONTRAST   IMPRESSION: Interval progression of multilevel lumbar spondylosis, most pronounced at L4-L5 and L5-S1, when compared to 6/27/2019. There is moderate to severe left neuroforaminal stenosis at L4-L5 with  abutment of the exiting left L4 nerve. Additional moderate to severe  bilateral neuroforaminal stenosis with abutment of the exiting  bilateral L5 nerves at L5-S1 and abutment of the descending left S1  nerve root.         PAIN RELEVANT CONDITIONS:   1.  Ankylosing spondylosis, Lumbar DDD with left S1 nerve involvement, lumbar stenosis  2.  Chronic migraine headaches  3.  Chronic cervical pain, DDD  4.  Peripheral small fiber neuropathy    ASSESSMENT AND PLAN    (G89.29) Chronic intractable pain  (primary encounter diagnosis)  (M47.16) Lumbar spondylosis with myelopathy  (M51.36) DDD (degenerative disc disease), lumbar  Comment: Tito expresses concern about post op pain management. As noted above, he had an episode of psychosis following a lumbar surgery in 2007 that was triggered by severe uncontrolled, under treated acute pain. I reviewed expectations. What he should ask for and from whom. Also assured him that my patients who have had surgery with Dr Biggs have done very well with post op pain. I let him know that I will reach out to the inpatient pain management team to let them know his concerns. Explained that they may not have orders for medications right away when he gets to the floor post op and that he may ask for a dose of pain medication right before he goes up if needed.   Plan: Continue current medications. Follow up with DV 4 weeks after surgery as scheduled.        PATIENT INSTRUCTIONS:     Diagnosis reviewed, treatment option addressed, and risk/benefits discussed.  Self-care  instructions given.  I am recommending a multidisciplinary treatment plan to help this patient better manage pain.    Remember to request ALL medication refills 5-7 BUSINESS days before you run out.     Health Psychology: YES, Continue per your psychologist's recommendations.  Physical therapy: YES, You may or may not do PT after surgery.   30 minute Clinic follow-up with JOCELYN Zavala NP-C on 10/28/2024 at 8:00 am   Medication Management :   Continue current medications until surgery.   Stacia will send the Post Operative Pain Medication policy to Dr Biggs and give the inpatient pain management team a head's up.     POST PROCEDURE PAIN MANAGEMENT MEDICATIONS:   [facility] does not prescribe post procedure opiate pain medications.     In general, patients who take opiates daily may require up to 50% higher than average dose to control post op pain as compared to an opiate naive patient. They may also require taking additional post operative pain medication up to 50% longer than an opiate naive patient.     If we have concerns about a specific patient in regards to compliance or safety in opiate use, it will be clearly documented in the chart. We may also reach out to you with specific concerns regarding an patient.     To avoid insurance coverage problems, we highly recommend that you prescribe something other that the immediate release opiate that the patient uses for their chronic pain. For example, if the patient takes Oxycodone 5 mg for chronic pain, order Percocet 5/325 mg or Hydrocodone/APAP 7.5 mg. If the patient cannot use Tylenol, the Dilaudid 2 mg is a good option.     If the post operative opiate brings their daily opiate dose over 50 MME/day, please order Narcan and show the patient how it is to be used. We are also always happy to have a post op medication consultation with our patients to review safety concerns and order the Narcan.     [facility] remain committed to collaborating with our colleagues in in all service lines, most especially with our surgical colleagues to be sure our mutual patients have the safest opportunity for post operative pain relief and recovery.    Please reach out to us with any questions regarding post operative pain management or dosing. The clinic number is [phone] or you can reach any of us via staff messenger.     Thank you,     JOCELYN Padgett NP-C [facility]        I have reviewed the note as documented above.  This accurately captures the substance of my conversation with the patient.    BILLING TIME DOCUMENTATION:   TOTAL TIME on the date of service includes:   Time spent preparing to see the patient: 5 minutes (reviewing records and tests)  Time spend face to face with the patient: 17 minutes  Time spent ordering tests, medications, procedures and referrals: 0 minutes  Time spent Referring and communicating with other healthcare professionals: 3 minutes  Documenting clinical information in Epic: 5 minutes    The total TIME spent on this patient on the day of the appointment was 30 minutes.     JOCELYN Padgett NP-C [facility] [place]    (Information in italics and blue color are taken from previous pain and consulting medical providers notes and are documented as such)

## 2024-09-24 ENCOUNTER — ANESTHESIA EVENT (OUTPATIENT)
Dept: SURGERY | Facility: HOSPITAL | Age: 49
End: 2024-09-24
Payer: MEDICARE

## 2024-09-24 RX ORDER — OLOPATADINE HYDROCHLORIDE 2 MG/ML
1 SOLUTION/ DROPS OPHTHALMIC DAILY
Status: ON HOLD | COMMUNITY
End: 2024-09-25

## 2024-09-24 RX ORDER — GUAIFENESIN 600 MG/1
1200 TABLET, EXTENDED RELEASE ORAL 2 TIMES DAILY
COMMUNITY

## 2024-09-25 ENCOUNTER — ANESTHESIA (OUTPATIENT)
Dept: SURGERY | Facility: HOSPITAL | Age: 49
End: 2024-09-25
Payer: MEDICARE

## 2024-09-25 ENCOUNTER — APPOINTMENT (OUTPATIENT)
Dept: RADIOLOGY | Facility: HOSPITAL | Age: 49
End: 2024-09-25
Attending: PHYSICIAN ASSISTANT
Payer: MEDICARE

## 2024-09-25 ENCOUNTER — HOSPITAL ENCOUNTER (OUTPATIENT)
Facility: HOSPITAL | Age: 49
Discharge: HOME | End: 2024-09-27
Attending: SURGERY | Admitting: SURGERY
Payer: MEDICARE

## 2024-09-25 DIAGNOSIS — Z98.890 HISTORY OF LUMBAR SURGERY: Primary | ICD-10-CM

## 2024-09-25 DIAGNOSIS — M51.369 DDD (DEGENERATIVE DISC DISEASE), LUMBAR: ICD-10-CM

## 2024-09-25 DIAGNOSIS — G89.29 CHRONIC INTRACTABLE PAIN: ICD-10-CM

## 2024-09-25 DIAGNOSIS — G43.111 INTRACTABLE MIGRAINE WITH AURA WITH STATUS MIGRAINOSUS: ICD-10-CM

## 2024-09-25 DIAGNOSIS — G62.9 PERIPHERAL POLYNEUROPATHY: ICD-10-CM

## 2024-09-25 DIAGNOSIS — M47.816 LUMBAR FACET ARTHROPATHY: ICD-10-CM

## 2024-09-25 DIAGNOSIS — M45.8 ANKYLOSING SPONDYLITIS OF SACRAL REGION (H): ICD-10-CM

## 2024-09-25 PROBLEM — M54.16 LUMBAR RADICULOPATHY: Status: ACTIVE | Noted: 2024-09-25

## 2024-09-25 LAB
ANION GAP SERPL CALCULATED.3IONS-SCNC: 13 MMOL/L (ref 7–15)
BUN SERPL-MCNC: 11.2 MG/DL (ref 6–20)
CALCIUM SERPL-MCNC: 9.1 MG/DL (ref 8.8–10.4)
CHLORIDE SERPL-SCNC: 103 MMOL/L (ref 98–107)
CREAT SERPL-MCNC: 0.86 MG/DL (ref 0.67–1.17)
EGFRCR SERPLBLD CKD-EPI 2021: >90 ML/MIN/1.73M2
ERYTHROCYTE [DISTWIDTH] IN BLOOD BY AUTOMATED COUNT: 12.6 % (ref 10–15)
GLUCOSE BLDC GLUCOMTR-MCNC: 118 MG/DL (ref 70–99)
GLUCOSE BLDC GLUCOMTR-MCNC: 160 MG/DL (ref 70–99)
GLUCOSE BLDC GLUCOMTR-MCNC: 178 MG/DL (ref 70–99)
GLUCOSE SERPL-MCNC: 163 MG/DL (ref 70–99)
HCO3 SERPL-SCNC: 21 MMOL/L (ref 22–29)
HCT VFR BLD AUTO: 40.4 % (ref 40–53)
HGB BLD-MCNC: 13.7 G/DL (ref 13.3–17.7)
MCH RBC QN AUTO: 31.6 PG (ref 26.5–33)
MCHC RBC AUTO-ENTMCNC: 33.9 G/DL (ref 31.5–36.5)
MCV RBC AUTO: 93 FL (ref 78–100)
PLATELET # BLD AUTO: 218 10E3/UL (ref 150–450)
POTASSIUM SERPL-SCNC: 4.3 MMOL/L (ref 3.4–5.3)
RBC # BLD AUTO: 4.33 10E6/UL (ref 4.4–5.9)
SODIUM SERPL-SCNC: 137 MMOL/L (ref 135–145)
WBC # BLD AUTO: 9.3 10E3/UL (ref 4–11)

## 2024-09-25 PROCEDURE — 99204 OFFICE O/P NEW MOD 45 MIN: CPT

## 2024-09-25 PROCEDURE — 250N000025 HC SEVOFLURANE, PER MIN: Performed by: SURGERY

## 2024-09-25 PROCEDURE — 250N000009 HC RX 250: Performed by: SURGERY

## 2024-09-25 PROCEDURE — 63048 LAM FACETEC &FORAMOT EA ADDL: CPT | Mod: AS | Performed by: PHYSICIAN ASSISTANT

## 2024-09-25 PROCEDURE — 63005 REMOVE SPINE LAMINA 1/2 LMBR: CPT | Performed by: ANESTHESIOLOGY

## 2024-09-25 PROCEDURE — 710N000009 HC RECOVERY PHASE 1, LEVEL 1, PER MIN: Performed by: SURGERY

## 2024-09-25 PROCEDURE — 250N000009 HC RX 250

## 2024-09-25 PROCEDURE — 63048 LAM FACETEC &FORAMOT EA ADDL: CPT | Performed by: SURGERY

## 2024-09-25 PROCEDURE — 82962 GLUCOSE BLOOD TEST: CPT

## 2024-09-25 PROCEDURE — 63047 LAM FACETEC & FORAMOT LUMBAR: CPT | Performed by: SURGERY

## 2024-09-25 PROCEDURE — 250N000011 HC RX IP 250 OP 636: Performed by: PHYSICIAN ASSISTANT

## 2024-09-25 PROCEDURE — 80048 BASIC METABOLIC PNL TOTAL CA: CPT

## 2024-09-25 PROCEDURE — 370N000017 HC ANESTHESIA TECHNICAL FEE, PER MIN: Performed by: SURGERY

## 2024-09-25 PROCEDURE — 360N000077 HC SURGERY LEVEL 4, PER MIN: Performed by: SURGERY

## 2024-09-25 PROCEDURE — 272N000001 HC OR GENERAL SUPPLY STERILE: Performed by: SURGERY

## 2024-09-25 PROCEDURE — 85027 COMPLETE CBC AUTOMATED: CPT

## 2024-09-25 PROCEDURE — 63047 LAM FACETEC & FORAMOT LUMBAR: CPT | Mod: AS | Performed by: PHYSICIAN ASSISTANT

## 2024-09-25 PROCEDURE — 258N000003 HC RX IP 258 OP 636: Performed by: PHYSICIAN ASSISTANT

## 2024-09-25 PROCEDURE — 250N000013 HC RX MED GY IP 250 OP 250 PS 637: Performed by: ANESTHESIOLOGY

## 2024-09-25 PROCEDURE — 999N000141 HC STATISTIC PRE-PROCEDURE NURSING ASSESSMENT: Performed by: SURGERY

## 2024-09-25 PROCEDURE — 250N000013 HC RX MED GY IP 250 OP 250 PS 637: Performed by: PHYSICIAN ASSISTANT

## 2024-09-25 PROCEDURE — 999N000063 XR LUMBAR SPINE PORT 1 VIEW

## 2024-09-25 PROCEDURE — 63005 REMOVE SPINE LAMINA 1/2 LMBR: CPT

## 2024-09-25 PROCEDURE — 250N000011 HC RX IP 250 OP 636: Performed by: ANESTHESIOLOGY

## 2024-09-25 PROCEDURE — 250N000011 HC RX IP 250 OP 636: Performed by: SURGERY

## 2024-09-25 PROCEDURE — 36415 COLL VENOUS BLD VENIPUNCTURE: CPT

## 2024-09-25 PROCEDURE — 258N000003 HC RX IP 258 OP 636: Performed by: ANESTHESIOLOGY

## 2024-09-25 PROCEDURE — 258N000003 HC RX IP 258 OP 636

## 2024-09-25 PROCEDURE — 250N000013 HC RX MED GY IP 250 OP 250 PS 637

## 2024-09-25 PROCEDURE — 250N000011 HC RX IP 250 OP 636

## 2024-09-25 RX ORDER — ONDANSETRON 4 MG/1
4 TABLET, ORALLY DISINTEGRATING ORAL EVERY 30 MIN PRN
Status: DISCONTINUED | OUTPATIENT
Start: 2024-09-25 | End: 2024-09-25 | Stop reason: HOSPADM

## 2024-09-25 RX ORDER — NALOXONE HYDROCHLORIDE 0.4 MG/ML
0.4 INJECTION, SOLUTION INTRAMUSCULAR; INTRAVENOUS; SUBCUTANEOUS
Status: DISCONTINUED | OUTPATIENT
Start: 2024-09-25 | End: 2024-09-27 | Stop reason: HOSPADM

## 2024-09-25 RX ORDER — FENTANYL CITRATE 50 UG/ML
25 INJECTION, SOLUTION INTRAMUSCULAR; INTRAVENOUS EVERY 5 MIN PRN
Status: DISCONTINUED | OUTPATIENT
Start: 2024-09-25 | End: 2024-09-25 | Stop reason: HOSPADM

## 2024-09-25 RX ORDER — MEPERIDINE HYDROCHLORIDE 25 MG/ML
12.5 INJECTION INTRAMUSCULAR; INTRAVENOUS; SUBCUTANEOUS EVERY 5 MIN PRN
Status: DISCONTINUED | OUTPATIENT
Start: 2024-09-25 | End: 2024-09-25 | Stop reason: HOSPADM

## 2024-09-25 RX ORDER — SODIUM CHLORIDE 9 MG/ML
INJECTION, SOLUTION INTRAVENOUS CONTINUOUS
Status: DISCONTINUED | OUTPATIENT
Start: 2024-09-25 | End: 2024-09-27 | Stop reason: HOSPADM

## 2024-09-25 RX ORDER — ALBUTEROL SULFATE 90 UG/1
2 AEROSOL, METERED RESPIRATORY (INHALATION) EVERY 6 HOURS PRN
Status: DISCONTINUED | OUTPATIENT
Start: 2024-09-25 | End: 2024-09-27 | Stop reason: HOSPADM

## 2024-09-25 RX ORDER — DEXAMETHASONE SODIUM PHOSPHATE 4 MG/ML
4 INJECTION, SOLUTION INTRA-ARTICULAR; INTRALESIONAL; INTRAMUSCULAR; INTRAVENOUS; SOFT TISSUE
Status: DISCONTINUED | OUTPATIENT
Start: 2024-09-25 | End: 2024-09-25 | Stop reason: HOSPADM

## 2024-09-25 RX ORDER — HYDROMORPHONE HCL IN WATER/PF 6 MG/30 ML
0.2 PATIENT CONTROLLED ANALGESIA SYRINGE INTRAVENOUS
Status: DISCONTINUED | OUTPATIENT
Start: 2024-09-25 | End: 2024-09-27 | Stop reason: HOSPADM

## 2024-09-25 RX ORDER — MONTELUKAST SODIUM 10 MG/1
10 TABLET ORAL EVERY EVENING
Status: DISCONTINUED | OUTPATIENT
Start: 2024-09-25 | End: 2024-09-27 | Stop reason: HOSPADM

## 2024-09-25 RX ORDER — PREGABALIN 75 MG/1
150 CAPSULE ORAL 2 TIMES DAILY
Status: DISCONTINUED | OUTPATIENT
Start: 2024-09-25 | End: 2024-09-27 | Stop reason: HOSPADM

## 2024-09-25 RX ORDER — ONDANSETRON 2 MG/ML
INJECTION INTRAMUSCULAR; INTRAVENOUS PRN
Status: DISCONTINUED | OUTPATIENT
Start: 2024-09-25 | End: 2024-09-25

## 2024-09-25 RX ORDER — PREGABALIN 150 MG/1
150 CAPSULE ORAL 2 TIMES DAILY
COMMUNITY

## 2024-09-25 RX ORDER — PANTOPRAZOLE SODIUM 40 MG/1
40 TABLET, DELAYED RELEASE ORAL
Status: DISCONTINUED | OUTPATIENT
Start: 2024-09-26 | End: 2024-09-27 | Stop reason: HOSPADM

## 2024-09-25 RX ORDER — MAGNESIUM SULFATE 4 G/50ML
4 INJECTION INTRAVENOUS ONCE
Status: COMPLETED | OUTPATIENT
Start: 2024-09-25 | End: 2024-09-25

## 2024-09-25 RX ORDER — ONDANSETRON 2 MG/ML
4 INJECTION INTRAMUSCULAR; INTRAVENOUS EVERY 6 HOURS PRN
Status: DISCONTINUED | OUTPATIENT
Start: 2024-09-25 | End: 2024-09-27 | Stop reason: HOSPADM

## 2024-09-25 RX ORDER — FLUTICASONE FUROATE AND VILANTEROL 100; 25 UG/1; UG/1
1 POWDER RESPIRATORY (INHALATION) DAILY
Status: DISCONTINUED | OUTPATIENT
Start: 2024-09-26 | End: 2024-09-27 | Stop reason: HOSPADM

## 2024-09-25 RX ORDER — TRIAMCINOLONE ACETONIDE 1 MG/G
CREAM TOPICAL 2 TIMES DAILY PRN
COMMUNITY

## 2024-09-25 RX ORDER — AMOXICILLIN 250 MG
1 CAPSULE ORAL 2 TIMES DAILY
Status: DISCONTINUED | OUTPATIENT
Start: 2024-09-25 | End: 2024-09-27 | Stop reason: HOSPADM

## 2024-09-25 RX ORDER — ACETAMINOPHEN 325 MG/1
650 TABLET ORAL EVERY 4 HOURS PRN
Status: DISCONTINUED | OUTPATIENT
Start: 2024-09-28 | End: 2024-09-27 | Stop reason: HOSPADM

## 2024-09-25 RX ORDER — DOCUSATE SODIUM 100 MG/1
100 CAPSULE, LIQUID FILLED ORAL 2 TIMES DAILY
Status: DISCONTINUED | OUTPATIENT
Start: 2024-09-25 | End: 2024-09-27 | Stop reason: HOSPADM

## 2024-09-25 RX ORDER — OXYCODONE HYDROCHLORIDE 5 MG/1
10 TABLET ORAL EVERY 4 HOURS PRN
Status: DISCONTINUED | OUTPATIENT
Start: 2024-09-25 | End: 2024-09-27 | Stop reason: HOSPADM

## 2024-09-25 RX ORDER — PYRIDOSTIGMINE BROMIDE 60 MG/1
60 TABLET ORAL 3 TIMES DAILY
Status: DISCONTINUED | OUTPATIENT
Start: 2024-09-25 | End: 2024-09-27 | Stop reason: HOSPADM

## 2024-09-25 RX ORDER — LIDOCAINE HYDROCHLORIDE 10 MG/ML
INJECTION, SOLUTION INFILTRATION; PERINEURAL PRN
Status: DISCONTINUED | OUTPATIENT
Start: 2024-09-25 | End: 2024-09-25

## 2024-09-25 RX ORDER — METHOCARBAMOL 500 MG/1
500 TABLET, FILM COATED ORAL EVERY 6 HOURS
Status: DISCONTINUED | OUTPATIENT
Start: 2024-09-25 | End: 2024-09-27 | Stop reason: HOSPADM

## 2024-09-25 RX ORDER — CEFAZOLIN SODIUM/WATER 3 G/30 ML
3 SYRINGE (ML) INTRAVENOUS SEE ADMIN INSTRUCTIONS
Status: DISCONTINUED | OUTPATIENT
Start: 2024-09-25 | End: 2024-09-25 | Stop reason: HOSPADM

## 2024-09-25 RX ORDER — TRIAMCINOLONE ACETONIDE 1 MG/G
CREAM TOPICAL 2 TIMES DAILY PRN
Status: DISCONTINUED | OUTPATIENT
Start: 2024-09-25 | End: 2024-09-27 | Stop reason: HOSPADM

## 2024-09-25 RX ORDER — METOPROLOL SUCCINATE 100 MG/1
200 TABLET, EXTENDED RELEASE ORAL DAILY
Status: DISCONTINUED | OUTPATIENT
Start: 2024-09-26 | End: 2024-09-27 | Stop reason: HOSPADM

## 2024-09-25 RX ORDER — MAGNESIUM HYDROXIDE 1200 MG/15ML
LIQUID ORAL PRN
Status: DISCONTINUED | OUTPATIENT
Start: 2024-09-25 | End: 2024-09-25 | Stop reason: HOSPADM

## 2024-09-25 RX ORDER — CLONAZEPAM 0.5 MG/1
1 TABLET ORAL AT BEDTIME
COMMUNITY

## 2024-09-25 RX ORDER — LIDOCAINE 40 MG/G
CREAM TOPICAL
Status: DISCONTINUED | OUTPATIENT
Start: 2024-09-25 | End: 2024-09-25 | Stop reason: HOSPADM

## 2024-09-25 RX ORDER — METOPROLOL SUCCINATE 100 MG/1
100 TABLET, EXTENDED RELEASE ORAL EVERY EVENING
Status: DISCONTINUED | OUTPATIENT
Start: 2024-09-25 | End: 2024-09-27 | Stop reason: HOSPADM

## 2024-09-25 RX ORDER — NALOXONE HYDROCHLORIDE 0.4 MG/ML
0.1 INJECTION, SOLUTION INTRAMUSCULAR; INTRAVENOUS; SUBCUTANEOUS
Status: DISCONTINUED | OUTPATIENT
Start: 2024-09-25 | End: 2024-09-25 | Stop reason: HOSPADM

## 2024-09-25 RX ORDER — CEFAZOLIN SODIUM/WATER 3 G/30 ML
3 SYRINGE (ML) INTRAVENOUS
Status: COMPLETED | OUTPATIENT
Start: 2024-09-25 | End: 2024-09-25

## 2024-09-25 RX ORDER — FLUTICASONE PROPIONATE 50 MCG
2 SPRAY, SUSPENSION (ML) NASAL EVERY EVENING
Status: DISCONTINUED | OUTPATIENT
Start: 2024-09-25 | End: 2024-09-27 | Stop reason: HOSPADM

## 2024-09-25 RX ORDER — DEXAMETHASONE SODIUM PHOSPHATE 10 MG/ML
INJECTION, SOLUTION INTRAMUSCULAR; INTRAVENOUS PRN
Status: DISCONTINUED | OUTPATIENT
Start: 2024-09-25 | End: 2024-09-25

## 2024-09-25 RX ORDER — FENTANYL CITRATE 50 UG/ML
50 INJECTION, SOLUTION INTRAMUSCULAR; INTRAVENOUS EVERY 5 MIN PRN
Status: DISCONTINUED | OUTPATIENT
Start: 2024-09-25 | End: 2024-09-25 | Stop reason: HOSPADM

## 2024-09-25 RX ORDER — ACETAMINOPHEN 325 MG/1
975 TABLET ORAL EVERY 8 HOURS
Status: DISCONTINUED | OUTPATIENT
Start: 2024-09-25 | End: 2024-09-27 | Stop reason: HOSPADM

## 2024-09-25 RX ORDER — OXYCODONE HYDROCHLORIDE 5 MG/1
5 TABLET ORAL EVERY 4 HOURS PRN
Status: DISCONTINUED | OUTPATIENT
Start: 2024-09-25 | End: 2024-09-27 | Stop reason: HOSPADM

## 2024-09-25 RX ORDER — BUPROPION HYDROCHLORIDE 150 MG/1
300 TABLET ORAL DAILY
Status: DISCONTINUED | OUTPATIENT
Start: 2024-09-26 | End: 2024-09-27 | Stop reason: HOSPADM

## 2024-09-25 RX ORDER — DEXMEDETOMIDINE HYDROCHLORIDE 4 UG/ML
INJECTION, SOLUTION INTRAVENOUS CONTINUOUS PRN
Status: DISCONTINUED | OUTPATIENT
Start: 2024-09-25 | End: 2024-09-25

## 2024-09-25 RX ORDER — CLONAZEPAM 0.5 MG/1
0.5 TABLET ORAL DAILY PRN
Status: DISCONTINUED | OUTPATIENT
Start: 2024-09-25 | End: 2024-09-27 | Stop reason: HOSPADM

## 2024-09-25 RX ORDER — CLONAZEPAM 1 MG/1
1 TABLET ORAL AT BEDTIME
Status: DISCONTINUED | OUTPATIENT
Start: 2024-09-25 | End: 2024-09-27 | Stop reason: HOSPADM

## 2024-09-25 RX ORDER — PROPOFOL 10 MG/ML
INJECTION, EMULSION INTRAVENOUS PRN
Status: DISCONTINUED | OUTPATIENT
Start: 2024-09-25 | End: 2024-09-25

## 2024-09-25 RX ORDER — HYDROMORPHONE HCL IN WATER/PF 6 MG/30 ML
0.2 PATIENT CONTROLLED ANALGESIA SYRINGE INTRAVENOUS EVERY 5 MIN PRN
Status: DISCONTINUED | OUTPATIENT
Start: 2024-09-25 | End: 2024-09-25 | Stop reason: HOSPADM

## 2024-09-25 RX ORDER — PROCHLORPERAZINE MALEATE 10 MG
10 TABLET ORAL EVERY 6 HOURS PRN
Status: DISCONTINUED | OUTPATIENT
Start: 2024-09-25 | End: 2024-09-27 | Stop reason: HOSPADM

## 2024-09-25 RX ORDER — OXYCODONE HYDROCHLORIDE 5 MG/1
5 TABLET ORAL EVERY 4 HOURS PRN
Status: DISCONTINUED | OUTPATIENT
Start: 2024-09-25 | End: 2024-09-25 | Stop reason: HOSPADM

## 2024-09-25 RX ORDER — LISINOPRIL 20 MG/1
40 TABLET ORAL DAILY
Status: DISCONTINUED | OUTPATIENT
Start: 2024-09-26 | End: 2024-09-27 | Stop reason: HOSPADM

## 2024-09-25 RX ORDER — DIVALPROEX SODIUM 125 MG/1
125 CAPSULE, COATED PELLETS ORAL EVERY EVENING
Status: DISCONTINUED | OUTPATIENT
Start: 2024-09-25 | End: 2024-09-27 | Stop reason: HOSPADM

## 2024-09-25 RX ORDER — NICOTINE POLACRILEX 4 MG
15-30 LOZENGE BUCCAL
Status: DISCONTINUED | OUTPATIENT
Start: 2024-09-25 | End: 2024-09-27 | Stop reason: HOSPADM

## 2024-09-25 RX ORDER — HYDROMORPHONE HCL IN WATER/PF 6 MG/30 ML
0.4 PATIENT CONTROLLED ANALGESIA SYRINGE INTRAVENOUS EVERY 5 MIN PRN
Status: DISCONTINUED | OUTPATIENT
Start: 2024-09-25 | End: 2024-09-25 | Stop reason: HOSPADM

## 2024-09-25 RX ORDER — ACETAMINOPHEN 325 MG/1
975 TABLET ORAL ONCE
Status: COMPLETED | OUTPATIENT
Start: 2024-09-25 | End: 2024-09-25

## 2024-09-25 RX ORDER — METOPROLOL SUCCINATE 200 MG/1
200 TABLET, EXTENDED RELEASE ORAL DAILY
COMMUNITY

## 2024-09-25 RX ORDER — CEFAZOLIN SODIUM 2 G/100ML
2 INJECTION, SOLUTION INTRAVENOUS EVERY 8 HOURS
Status: DISCONTINUED | OUTPATIENT
Start: 2024-09-25 | End: 2024-09-25

## 2024-09-25 RX ORDER — KETAMINE HYDROCHLORIDE 10 MG/ML
INJECTION INTRAMUSCULAR; INTRAVENOUS PRN
Status: DISCONTINUED | OUTPATIENT
Start: 2024-09-25 | End: 2024-09-25

## 2024-09-25 RX ORDER — MULTIVITAMIN,THERAPEUTIC
1 TABLET ORAL DAILY
Status: DISCONTINUED | OUTPATIENT
Start: 2024-09-26 | End: 2024-09-27 | Stop reason: HOSPADM

## 2024-09-25 RX ORDER — DULOXETIN HYDROCHLORIDE 60 MG/1
120 CAPSULE, DELAYED RELEASE ORAL DAILY
Status: DISCONTINUED | OUTPATIENT
Start: 2024-09-25 | End: 2024-09-27 | Stop reason: HOSPADM

## 2024-09-25 RX ORDER — ONDANSETRON 2 MG/ML
4 INJECTION INTRAMUSCULAR; INTRAVENOUS EVERY 30 MIN PRN
Status: DISCONTINUED | OUTPATIENT
Start: 2024-09-25 | End: 2024-09-25 | Stop reason: HOSPADM

## 2024-09-25 RX ORDER — LEVOTHYROXINE SODIUM 25 UG/1
75 TABLET ORAL
Status: DISCONTINUED | OUTPATIENT
Start: 2024-09-26 | End: 2024-09-27 | Stop reason: HOSPADM

## 2024-09-25 RX ORDER — METOCLOPRAMIDE 5 MG/1
5 TABLET ORAL 4 TIMES DAILY PRN
Status: DISCONTINUED | OUTPATIENT
Start: 2024-09-25 | End: 2024-09-27 | Stop reason: HOSPADM

## 2024-09-25 RX ORDER — FAMOTIDINE 20 MG/1
20 TABLET, FILM COATED ORAL DAILY
Status: DISCONTINUED | OUTPATIENT
Start: 2024-09-25 | End: 2024-09-27 | Stop reason: HOSPADM

## 2024-09-25 RX ORDER — GABAPENTIN 300 MG/1
300 CAPSULE ORAL
Status: DISCONTINUED | OUTPATIENT
Start: 2024-09-25 | End: 2024-09-25 | Stop reason: HOSPADM

## 2024-09-25 RX ORDER — LORAZEPAM 2 MG/ML
.5-1 INJECTION INTRAMUSCULAR
Status: DISCONTINUED | OUTPATIENT
Start: 2024-09-25 | End: 2024-09-25 | Stop reason: HOSPADM

## 2024-09-25 RX ORDER — SODIUM CHLORIDE, SODIUM LACTATE, POTASSIUM CHLORIDE, CALCIUM CHLORIDE 600; 310; 30; 20 MG/100ML; MG/100ML; MG/100ML; MG/100ML
INJECTION, SOLUTION INTRAVENOUS CONTINUOUS
Status: DISCONTINUED | OUTPATIENT
Start: 2024-09-25 | End: 2024-09-25 | Stop reason: HOSPADM

## 2024-09-25 RX ORDER — FEXOFENADINE HCL 180 MG/1
180 TABLET ORAL EVERY EVENING
Status: DISCONTINUED | OUTPATIENT
Start: 2024-09-25 | End: 2024-09-27 | Stop reason: HOSPADM

## 2024-09-25 RX ORDER — BISACODYL 5 MG/1
10 TABLET, DELAYED RELEASE ORAL DAILY PRN
Status: DISCONTINUED | OUTPATIENT
Start: 2024-09-25 | End: 2024-09-25

## 2024-09-25 RX ORDER — BISACODYL 10 MG
10 SUPPOSITORY, RECTAL RECTAL DAILY PRN
Status: DISCONTINUED | OUTPATIENT
Start: 2024-09-28 | End: 2024-09-27 | Stop reason: HOSPADM

## 2024-09-25 RX ORDER — FENTANYL CITRATE 50 UG/ML
INJECTION, SOLUTION INTRAMUSCULAR; INTRAVENOUS PRN
Status: DISCONTINUED | OUTPATIENT
Start: 2024-09-25 | End: 2024-09-25

## 2024-09-25 RX ORDER — FENOFIBRATE 48 MG/1
48 TABLET, COATED ORAL EVERY EVENING
Status: DISCONTINUED | OUTPATIENT
Start: 2024-09-26 | End: 2024-09-27 | Stop reason: HOSPADM

## 2024-09-25 RX ORDER — ACETAMINOPHEN 325 MG/1
975 TABLET ORAL
Status: DISCONTINUED | OUTPATIENT
Start: 2024-09-25 | End: 2024-09-25 | Stop reason: HOSPADM

## 2024-09-25 RX ORDER — FENTANYL CITRATE 50 UG/ML
25 INJECTION, SOLUTION INTRAMUSCULAR; INTRAVENOUS
Status: DISCONTINUED | OUTPATIENT
Start: 2024-09-25 | End: 2024-09-25 | Stop reason: HOSPADM

## 2024-09-25 RX ORDER — DEXTROSE MONOHYDRATE 25 G/50ML
25-50 INJECTION, SOLUTION INTRAVENOUS
Status: DISCONTINUED | OUTPATIENT
Start: 2024-09-25 | End: 2024-09-27 | Stop reason: HOSPADM

## 2024-09-25 RX ORDER — ONDANSETRON 4 MG/1
4 TABLET, ORALLY DISINTEGRATING ORAL EVERY 6 HOURS PRN
Status: DISCONTINUED | OUTPATIENT
Start: 2024-09-25 | End: 2024-09-27 | Stop reason: HOSPADM

## 2024-09-25 RX ORDER — HYDROMORPHONE HCL IN WATER/PF 6 MG/30 ML
0.4 PATIENT CONTROLLED ANALGESIA SYRINGE INTRAVENOUS
Status: DISCONTINUED | OUTPATIENT
Start: 2024-09-25 | End: 2024-09-27 | Stop reason: HOSPADM

## 2024-09-25 RX ORDER — OXYCODONE HYDROCHLORIDE 5 MG/1
10 TABLET ORAL EVERY 4 HOURS PRN
Status: DISCONTINUED | OUTPATIENT
Start: 2024-09-25 | End: 2024-09-25 | Stop reason: HOSPADM

## 2024-09-25 RX ORDER — ROSUVASTATIN CALCIUM 40 MG/1
40 TABLET, COATED ORAL EVERY EVENING
Status: DISCONTINUED | OUTPATIENT
Start: 2024-09-25 | End: 2024-09-27 | Stop reason: HOSPADM

## 2024-09-25 RX ORDER — NALOXONE HYDROCHLORIDE 0.4 MG/ML
0.2 INJECTION, SOLUTION INTRAMUSCULAR; INTRAVENOUS; SUBCUTANEOUS
Status: DISCONTINUED | OUTPATIENT
Start: 2024-09-25 | End: 2024-09-27 | Stop reason: HOSPADM

## 2024-09-25 RX ORDER — VALACYCLOVIR HYDROCHLORIDE 500 MG/1
500 TABLET, FILM COATED ORAL DAILY
Status: DISCONTINUED | OUTPATIENT
Start: 2024-09-25 | End: 2024-09-27 | Stop reason: HOSPADM

## 2024-09-25 RX ORDER — DEXMEDETOMIDINE HYDROCHLORIDE 4 UG/ML
INJECTION, SOLUTION INTRAVENOUS
Status: DISCONTINUED
Start: 2024-09-25 | End: 2024-09-25 | Stop reason: HOSPADM

## 2024-09-25 RX ORDER — SODIUM CHLORIDE, SODIUM LACTATE, POTASSIUM CHLORIDE, CALCIUM CHLORIDE 600; 310; 30; 20 MG/100ML; MG/100ML; MG/100ML; MG/100ML
INJECTION, SOLUTION INTRAVENOUS CONTINUOUS PRN
Status: DISCONTINUED | OUTPATIENT
Start: 2024-09-25 | End: 2024-09-25

## 2024-09-25 RX ORDER — POLYETHYLENE GLYCOL 3350 17 G/17G
17 POWDER, FOR SOLUTION ORAL DAILY
Status: DISCONTINUED | OUTPATIENT
Start: 2024-09-26 | End: 2024-09-27 | Stop reason: HOSPADM

## 2024-09-25 RX ORDER — CEFAZOLIN SODIUM 2 G/100ML
2 INJECTION, SOLUTION INTRAVENOUS EVERY 8 HOURS
Status: COMPLETED | OUTPATIENT
Start: 2024-09-25 | End: 2024-09-26

## 2024-09-25 RX ORDER — RISPERIDONE 0.5 MG/1
0.5 TABLET ORAL 2 TIMES DAILY PRN
Status: DISCONTINUED | OUTPATIENT
Start: 2024-09-25 | End: 2024-09-27 | Stop reason: HOSPADM

## 2024-09-25 RX ORDER — LORATADINE 10 MG/1
10 TABLET ORAL DAILY PRN
Status: DISCONTINUED | OUTPATIENT
Start: 2024-09-25 | End: 2024-09-27 | Stop reason: HOSPADM

## 2024-09-25 RX ORDER — BUPIVACAINE HYDROCHLORIDE AND EPINEPHRINE 2.5; 5 MG/ML; UG/ML
INJECTION, SOLUTION EPIDURAL; INFILTRATION; INTRACAUDAL; PERINEURAL PRN
Status: DISCONTINUED | OUTPATIENT
Start: 2024-09-25 | End: 2024-09-25 | Stop reason: HOSPADM

## 2024-09-25 RX ORDER — KETOROLAC TROMETHAMINE 30 MG/ML
15 INJECTION, SOLUTION INTRAMUSCULAR; INTRAVENOUS
Status: DISCONTINUED | OUTPATIENT
Start: 2024-09-25 | End: 2024-09-25 | Stop reason: HOSPADM

## 2024-09-25 RX ORDER — GUAIFENESIN 600 MG/1
1200 TABLET, EXTENDED RELEASE ORAL 2 TIMES DAILY
Status: DISCONTINUED | OUTPATIENT
Start: 2024-09-25 | End: 2024-09-27 | Stop reason: HOSPADM

## 2024-09-25 RX ADMIN — GUAIFENESIN 1200 MG: 600 TABLET ORAL at 21:31

## 2024-09-25 RX ADMIN — ROCURONIUM BROMIDE 20 MG: 50 INJECTION, SOLUTION INTRAVENOUS at 11:21

## 2024-09-25 RX ADMIN — DEXMEDETOMIDINE HYDROCHLORIDE 0.3 MCG/KG/HR: 400 INJECTION INTRAVENOUS at 11:20

## 2024-09-25 RX ADMIN — SODIUM CHLORIDE, POTASSIUM CHLORIDE, SODIUM LACTATE AND CALCIUM CHLORIDE: 600; 310; 30; 20 INJECTION, SOLUTION INTRAVENOUS at 10:23

## 2024-09-25 RX ADMIN — ROCURONIUM BROMIDE 10 MG: 50 INJECTION, SOLUTION INTRAVENOUS at 12:13

## 2024-09-25 RX ADMIN — MAGNESIUM SULFATE HEPTAHYDRATE 4 G: 80 INJECTION, SOLUTION INTRAVENOUS at 10:22

## 2024-09-25 RX ADMIN — Medication 2.9 G: at 11:17

## 2024-09-25 RX ADMIN — LIDOCAINE HYDROCHLORIDE 50 MG: 10 INJECTION, SOLUTION INFILTRATION; PERINEURAL at 10:56

## 2024-09-25 RX ADMIN — MONTELUKAST 10 MG: 10 TABLET, FILM COATED ORAL at 21:31

## 2024-09-25 RX ADMIN — PREGABALIN 150 MG: 75 CAPSULE ORAL at 19:34

## 2024-09-25 RX ADMIN — CLONAZEPAM 1 MG: 1 TABLET ORAL at 21:31

## 2024-09-25 RX ADMIN — HYDROMORPHONE HYDROCHLORIDE 0.5 MG: 1 INJECTION, SOLUTION INTRAMUSCULAR; INTRAVENOUS; SUBCUTANEOUS at 12:00

## 2024-09-25 RX ADMIN — ROCURONIUM BROMIDE 10 MG: 50 INJECTION, SOLUTION INTRAVENOUS at 12:39

## 2024-09-25 RX ADMIN — VALACYCLOVIR HYDROCHLORIDE 500 MG: 500 TABLET, FILM COATED ORAL at 21:31

## 2024-09-25 RX ADMIN — DIVALPROEX SODIUM 125 MG: 125 CAPSULE, COATED PELLETS ORAL at 21:30

## 2024-09-25 RX ADMIN — ACETAMINOPHEN 975 MG: 325 TABLET ORAL at 10:22

## 2024-09-25 RX ADMIN — DULOXETINE HYDROCHLORIDE 120 MG: 60 CAPSULE, DELAYED RELEASE PELLETS ORAL at 19:35

## 2024-09-25 RX ADMIN — FENTANYL CITRATE 50 MCG: 50 INJECTION, SOLUTION INTRAMUSCULAR; INTRAVENOUS at 14:01

## 2024-09-25 RX ADMIN — MIDAZOLAM 2 MG: 1 INJECTION INTRAMUSCULAR; INTRAVENOUS at 10:51

## 2024-09-25 RX ADMIN — FLUTICASONE PROPIONATE 2 SPRAY: 50 SPRAY, METERED NASAL at 21:32

## 2024-09-25 RX ADMIN — Medication 0.1 G: at 11:13

## 2024-09-25 RX ADMIN — FAMOTIDINE 20 MG: 20 TABLET ORAL at 19:34

## 2024-09-25 RX ADMIN — ROCURONIUM BROMIDE 20 MG: 50 INJECTION, SOLUTION INTRAVENOUS at 11:45

## 2024-09-25 RX ADMIN — HYDROMORPHONE HYDROCHLORIDE 0.4 MG: 0.2 INJECTION, SOLUTION INTRAMUSCULAR; INTRAVENOUS; SUBCUTANEOUS at 15:58

## 2024-09-25 RX ADMIN — CEFAZOLIN SODIUM 2 G: 2 INJECTION, SOLUTION INTRAVENOUS at 19:35

## 2024-09-25 RX ADMIN — DEXAMETHASONE SODIUM PHOSPHATE 10 MG: 10 INJECTION, SOLUTION INTRAMUSCULAR; INTRAVENOUS at 11:24

## 2024-09-25 RX ADMIN — OXYCODONE HYDROCHLORIDE 5 MG: 5 TABLET ORAL at 22:36

## 2024-09-25 RX ADMIN — FENTANYL CITRATE 100 MCG: 50 INJECTION INTRAMUSCULAR; INTRAVENOUS at 10:56

## 2024-09-25 RX ADMIN — OXYCODONE HYDROCHLORIDE 5 MG: 5 TABLET ORAL at 18:14

## 2024-09-25 RX ADMIN — FENTANYL CITRATE 50 MCG: 50 INJECTION, SOLUTION INTRAMUSCULAR; INTRAVENOUS at 13:55

## 2024-09-25 RX ADMIN — ACETAMINOPHEN 975 MG: 325 TABLET, FILM COATED ORAL at 17:11

## 2024-09-25 RX ADMIN — SODIUM CHLORIDE: 9 INJECTION, SOLUTION INTRAVENOUS at 17:10

## 2024-09-25 RX ADMIN — SODIUM CHLORIDE, POTASSIUM CHLORIDE, SODIUM LACTATE AND CALCIUM CHLORIDE: 600; 310; 30; 20 INJECTION, SOLUTION INTRAVENOUS at 10:51

## 2024-09-25 RX ADMIN — PROPOFOL 160 MG: 10 INJECTION, EMULSION INTRAVENOUS at 10:56

## 2024-09-25 RX ADMIN — ROCURONIUM BROMIDE 50 MG: 50 INJECTION, SOLUTION INTRAVENOUS at 10:56

## 2024-09-25 RX ADMIN — KETAMINE HYDROCHLORIDE 10 MG: 10 INJECTION INTRAMUSCULAR; INTRAVENOUS at 11:50

## 2024-09-25 RX ADMIN — ONDANSETRON 8 MG: 2 INJECTION INTRAMUSCULAR; INTRAVENOUS at 13:08

## 2024-09-25 RX ADMIN — HYDROMORPHONE HYDROCHLORIDE 0.4 MG: 0.2 INJECTION, SOLUTION INTRAMUSCULAR; INTRAVENOUS; SUBCUTANEOUS at 14:49

## 2024-09-25 RX ADMIN — PHENYLEPHRINE HYDROCHLORIDE 0.3 MCG/KG/MIN: 10 INJECTION INTRAVENOUS at 11:48

## 2024-09-25 RX ADMIN — SUGAMMADEX 275 MG: 100 INJECTION, SOLUTION INTRAVENOUS at 13:16

## 2024-09-25 RX ADMIN — KETAMINE HYDROCHLORIDE 10 MG: 10 INJECTION INTRAMUSCULAR; INTRAVENOUS at 12:44

## 2024-09-25 RX ADMIN — SODIUM CHLORIDE, POTASSIUM CHLORIDE, SODIUM LACTATE AND CALCIUM CHLORIDE: 600; 310; 30; 20 INJECTION, SOLUTION INTRAVENOUS at 13:06

## 2024-09-25 RX ADMIN — METHOCARBAMOL 500 MG: 500 TABLET ORAL at 17:11

## 2024-09-25 RX ADMIN — FEXOFENADINE HCL 180 MG: 180 TABLET ORAL at 21:32

## 2024-09-25 RX ADMIN — PYRIDOSTIGMINE BROMIDE 60 MG: 60 TABLET ORAL at 21:31

## 2024-09-25 RX ADMIN — SENNOSIDES AND DOCUSATE SODIUM 1 TABLET: 8.6; 5 TABLET ORAL at 21:31

## 2024-09-25 RX ADMIN — ROSUVASTATIN CALCIUM 40 MG: 40 TABLET, FILM COATED ORAL at 21:32

## 2024-09-25 RX ADMIN — ROCURONIUM BROMIDE 10 MG: 50 INJECTION, SOLUTION INTRAVENOUS at 12:53

## 2024-09-25 RX ADMIN — KETAMINE HYDROCHLORIDE 30 MG: 10 INJECTION INTRAMUSCULAR; INTRAVENOUS at 10:56

## 2024-09-25 RX ADMIN — METOPROLOL SUCCINATE 100 MG: 100 TABLET, EXTENDED RELEASE ORAL at 21:31

## 2024-09-25 ASSESSMENT — ACTIVITIES OF DAILY LIVING (ADL)
ADLS_ACUITY_SCORE: 31
ADLS_ACUITY_SCORE: 36
ADLS_ACUITY_SCORE: 34
ADLS_ACUITY_SCORE: 31
ADLS_ACUITY_SCORE: 29
ADLS_ACUITY_SCORE: 31
ADLS_ACUITY_SCORE: 34
ADLS_ACUITY_SCORE: 31
ADLS_ACUITY_SCORE: 31
ADLS_ACUITY_SCORE: 34
ADLS_ACUITY_SCORE: 32
ADLS_ACUITY_SCORE: 31
ADLS_ACUITY_SCORE: 36
ADLS_ACUITY_SCORE: 34
ADLS_ACUITY_SCORE: 32

## 2024-09-25 ASSESSMENT — LIFESTYLE VARIABLES: TOBACCO_USE: 0

## 2024-09-25 NOTE — ANESTHESIA POSTPROCEDURE EVALUATION
Patient: Joel Pineda    Procedure: Procedure(s):  bilateral lumbar 4-lumbar 5 hemilaminectomies, medial facetectomies and foraminotomies with left lumbar microdiscectomy and lumbar 5-sacral 1 foraminotomies       Anesthesia Type:  General    Note:  Disposition: Outpatient   Postop Pain Control: Uneventful            Sign Out: Well controlled pain   PONV: No   Neuro/Psych: Uneventful            Sign Out: Acceptable/Baseline neuro status   Airway/Respiratory: Uneventful            Sign Out: Acceptable/Baseline resp. status   CV/Hemodynamics: Uneventful            Sign Out: Acceptable CV status; No obvious hypovolemia; No obvious fluid overload   Other NRE: NONE   DID A NON-ROUTINE EVENT OCCUR? No    Event details/Postop Comments:  Doing well and without complaints.       Last vitals:  Vitals Value Taken Time   /70 09/25/24 1430   Temp 35.8  C (96.44  F) 09/25/24 1435   Pulse 64 09/25/24 1435   Resp 8 09/25/24 1435   SpO2 94 % 09/25/24 1435   Vitals shown include unfiled device data.    Electronically Signed By: Giovany Santos MD  September 25, 2024  2:36 PM

## 2024-09-25 NOTE — PHARMACY-ADMISSION MEDICATION HISTORY
Pharmacist Admission Medication History    Admission medication history is complete. The information provided in this note is only as accurate as the sources available at the time of the update.    Information Source(s): Patient via in-person    Pertinent Information: Patient took the following home medications today PTA 9/25/24: Acetaminophen 500 mg, famotidine, wixela inhaler, levothyroxine, metoclopramide, Risperidone, ubrelvy    Changes made to PTA medication list:  Added: None  Deleted: Clindamycin 1% lotion, metformin er 500 mg, olopatadine  Changed: Tylenol 500 mg tid changed to 500 mg daily in am, Depakote  mg changed from 125 mg BID to 125 mg every evening. Metoclopramide 10 mg qid to 5 mg qid prn . Pregabalin 150 mg tid to 150 mg bid .     Allergies reviewed with patient and updates made in EHR: yes    Medication History Completed By: STACY WILLIS RPH 9/25/2024 11:39 AM    PTA Med List   Medication Sig Last Dose    acetaminophen 500 MG CAPS Take 500 mg by mouth every morning. 9/25/2024 at am    albuterol (PROAIR HFA/PROVENTIL HFA/VENTOLIN HFA) 108 (90 Base) MCG/ACT inhaler Inhale 2 puffs into the lungs every 6 hours as needed for shortness of breath, wheezing or cough 90 day supply More than a month at patient has with him if he needs it, rarely uses    bisacodyl (DULCOLAX) 5 MG EC tablet Take 10 mg by mouth as needed for constipation Past Week at pm prn    buPROPion (WELLBUTRIN XL) 300 MG 24 hr tablet Take 1 tablet by mouth daily 9/24/2024 at am    cholecalciferol (D3-50) 1250 mcg (18129 units) capsule TAKE ONE CAPSULE BY MOUTH EVERY 2 WEEKS Past Week at 9/22    clonazePAM (KLONOPIN) 0.5 MG tablet Take 1 mg by mouth at bedtime. 9/24/2024 at hs    clonazePAM (KLONOPIN) 0.5 MG tablet Take 0.5 mg by mouth daily as needed for anxiety. Past Month at prn    cyanocobalamin (CYANOCOBALAMIN) 1000 MCG/ML injection INJECT 1 ML INTO THE MUSCLE EVERY 30 DAYS Past Month at 9/11    diphenhydrAMINE (BENADRYL) 25  MG capsule Take 25 mg by mouth once a week Before Enbrel injection Unknown at prn    divalproex sodium delayed-release (DEPAKOTE) 125 MG DR tablet Take 125 mg by mouth every evening. 9/24/2024 at hs    docusate sodium (COLACE) 50 MG capsule Take 100 mg by mouth 2 times daily 9/24/2024 at pm    DULoxetine (CYMBALTA) 60 MG capsule Take 120 mg by mouth daily  9/24/2024 at am    EPINEPHrine (ANY BX GENERIC EQUIV) 0.3 MG/0.3ML injection 2-pack Inject 0.3 mLs (0.3 mg) into the muscle as needed for anaphylaxis May repeat one time in 5-15 minutes if response to initial dose is inadequate. Unknown at prn    eszopiclone (LUNESTA) 3 MG tablet Take 3 mg by mouth At Bedtime  9/24/2024 at pm    etanercept (ENBREL SURECLICK) 50 MG/ML autoinjector Inject 50 mg Subcutaneous once a week . Hold for signs of infection, and seek medical attention. Past Month at 9/4/24    famotidine (PEPCID) 20 MG tablet Take 20 mg by mouth daily 9/25/2024 at am    fenofibrate (TRICOR) 48 MG tablet TAKE ONE TABLET BY MOUTH ONCE DAILY Past Week at pm    fexofenadine (ALLEGRA) 180 MG tablet Take 180 mg by mouth every evening. 9/24/2024 at pm    fluticasone (FLONASE) 50 MCG/ACT nasal spray Spray 2 sprays in nostril every evening. 9/24/2024 at pm    fluticasone-salmeterol (WIXELA INHUB) 100-50 MCG/ACT inhaler Inhale 1 puff into the lungs every 12 hours 9/25/2024 at am    guaiFENesin (MUCINEX) 600 MG 12 hr tablet Take 1,200 mg by mouth 2 times daily. 9/24/2024 at pm    levothyroxine (SYNTHROID/LEVOTHROID) 75 MCG tablet TAKE ONE TABLET BY MOUTH EVERY MORNING 9/25/2024 at am    lidocaine (XYLOCAINE) 5 % external ointment Apply topically 2 times daily as needed for moderate pain Past Month at prn    Melatonin 10 MG TABS tablet Take 10 mg by mouth at bedtime 9/24/2024 at hs    methocarbamol (ROBAXIN) 500 MG tablet TAKE 1 - 2 TABLETS BY MOUTH 4 TIMES DAILY AS NEEDED FOR MUSCLE SPASMS 9/24/2024 at 1 tablet last night    metoclopramide (REGLAN) 5 MG tablet Take 5 mg  by mouth 4 times daily as needed. 9/25/2024 at am    metoprolol succinate ER (TOPROL XL) 100 MG 24 hr tablet TAKE 1 TABLET BY MOUTH IN THE EVENING; TO BE USED WITH 200MG IN MORNING 9/24/2024 at p,    metoprolol succinate ER (TOPROL XL) 200 MG 24 hr tablet Take 200 mg by mouth daily. 9/25/2024 at am    montelukast (SINGULAIR) 10 MG tablet TAKE ONE TABLET BY MOUTH EVERY EVENING 9/24/2024 at pm    nabumetone (RELAFEN) 500 MG tablet Take 1-2 tablets (500-1,000 mg) by mouth 2 times daily as needed for moderate pain Past Month at holding for surgery    omega-3 acid ethyl esters (LOVAZA) 1 g capsule TAKE TWO CAPSULES BY MOUTH TWICE DAILY Past Month at holding for surgery    omeprazole (PRILOSEC) 20 MG DR capsule Take 20 mg by mouth daily. 9/25/2024 at am    oxyCODONE (ROXICODONE) 5 MG tablet Take 1 tablet (5 mg) by mouth 2 times daily as needed for breakthrough pain. Ok to fill 09/21/24 and begin using on 09/23/24. 30 days supply for chronic pain 9/24/2024 at 2.5 mg last night    pregabalin (LYRICA) 150 MG capsule Take 150 mg by mouth 2 times daily. 9/24/2024 at pm    pyridostigmine (MESTINON) 60 MG tablet Take 1 tablet by mouth 3 times daily 9/24/2024 at pm    ramipril (ALTACE) 10 MG capsule TAKE ONE CAPSULE BY MOUTH ONCE DAILY 9/24/2024 at am    riboflavin 100 MG CAPS Take 200 mg by mouth daily Two hundred milligrams nightly to prevent migraine 9/24/2024 at pm    risperiDONE (RISPERDAL) 0.5 MG tablet Take 0.5 mg by mouth 2 times daily as needed (aggitation). 9/25/2024 at am    rosuvastatin (CRESTOR) 40 MG tablet TAKE ONE TABLET BY MOUTH ONCE DAILY 9/24/2024 at pm    Semaglutide, 1 MG/DOSE, (OZEMPIC) 4 MG/3ML pen Inject 1 mg Subcutaneous once a week Past Month at 9/4/24    sodium chloride 0.9 % neb solution Take 3 mLs by nebulization every 3 hours as needed More than a month at prn    sodium fluoride 1.1 % CREA At Bedtime 9/24/2024 at hs    triamcinolone (KENALOG) 0.1 % external cream Apply topically 2 times daily as  needed for irritation. Apply for up to 2 weeks per month on hands More than a month at prn    UBRELVY 100 MG tablet Take 100 mg by mouth at onset of headache 9/25/2024    valACYclovir (VALTREX) 500 MG tablet Take 1 tablet (500 mg) by mouth daily 9/24/2024 at noon    vitamin B complex with vitamin C (STRESS TAB) tablet Take 1 tablet by mouth daily 9/24/2024 at afternoon    ZINC SULFATE-VITAMIN C MT Take 1 tablet by mouth daily Past Month at [facility] for surgery -am

## 2024-09-25 NOTE — INTERVAL H&P NOTE
"I have reviewed the surgical (or preoperative) H&P that is linked to this encounter, and examined the patient. There are no significant changes    Clinical Conditions Present on Arrival:  Clinically Significant Risk Factors Present on Admission                      # Obesity: Estimated body mass index is 34.45 kg/m  as calculated from the following:    Height as of 9/16/24: 1.93 m (6' 4\").    Weight as of this encounter: 128.4 kg (283 lb).       "

## 2024-09-25 NOTE — ANESTHESIA PREPROCEDURE EVALUATION
Anesthesia Pre-Procedure Evaluation    Patient: Joel Pineda   MRN: [id number] : 1975        Procedure : Procedure(s):  bilateral lumbar 4-lumbar 5 hemilaminectomies, medial facetectomies and foraminotomies with possible lumbar microdiscectomy and lumbar 5-sacral 1 foraminotomies          Past Medical History:   Diagnosis Date    Acne     Acquired hypothyroidism     Allergic state     Ankylosing spondylitis lumbar region (H)     Ankylosing spondylitis of sacral region (H)     Anxiety     Bipolar 2 disorder (H)     Chest pain     Chest pain, regulated w/BP meds. Clear arteries.    Chronic pain     Chronic, continuous use of opioids     DDD (degenerative disc disease), lumbar     Depressive disorder     Diabetes (H)     Diverticulosis     Dysautonomia (H)     Facet arthritis of cervical region     Gastroesophageal reflux disease     Hypertension     IBS (irritable bowel syndrome)     Intracranial arachnoid cyst     Lumbar radiculopathy     Major depressive disorder, recurrent episode (H24)     Multiple psych providers - they manage meds 2015: Provigil induced severe mood dis-function     Major depressive disorder, recurrent episode, severe (H) 2020    MDD (major depressive disorder), recurrent severe, without psychosis (H) 2021    Mixed dyslipidemia 2023    Obese     LLOYD (obstructive sleep apnea)- mild (AHI 11)     Polyneuropathy     POTS (postural orthostatic tachycardia syndrome)     Pulmonary embolism (H)     Skin exam, screening for cancer 2013    Sleep apnea     Thrombosis     Uncomplicated asthma       Past Surgical History:   Procedure Laterality Date    BACK SURGERY  10/2007    lumbar discectomy L5-S1    BIOPSY  09/15/2020    Colon Adenomas x2    COLONOSCOPY      Note: colonoscopy scheduled with [facility] on Friday, 9/4/15    COSMETIC SURGERY  2012    Nose Exterior - functional    GI SURGERY  2013    Sigmoidectomy    HERNIA REPAIR, UMBILICAL  2011    Dr. Evan whiting  [place]    INCISION AND DRAINAGE, ABSCESS, COMPLEX  08/23/2011    umbilical, Dr. Evan Beavers    LAPAROSCOPIC ASSISTED COLECTOMY LEFT (DESCENDING)  08/15/2013    Procedure: LAPAROSCOPIC ASSISTED COLECTOMY LEFT (DESCENDING);  Laparoscopic Hand Assisted Sigmoid Resection, Mobilization of Splenic Fissure, coloproctoscopy, *Latex Free Room* Anesthesia General with Pain block  ;  Surgeon: Aurora Justice MD;  Location: [facility] OR    NERVE SURGERY  08/18/2011    RF ablation @ L3-S1 @ [facility]    RECONSTRUCT NOSE AND SEPTUM (FUNCTIONAL)  10/14/2011    Procedure:RECONSTRUCT NOSE AND SEPTUM (FUNCTIONAL); Functional Septorhinoplasty, Turbinate Reduction, ; Surgeon:CEDRIC CUEVAS; Location:[facility] OR    SINUS SURGERY  10/01/2001    ethmoidectomy chronic sinusitis    SOFT TISSUE SURGERY  4/7/2022    Hidradenitis Suppurativa surgery      Allergies   Allergen Reactions    Amoxicillin-Pot Clavulanate Difficulty breathing    Banana Shortness Of Breath     Pt reports organic Banana is okay.     Clavulanic Acid Anaphylaxis     Other Reaction(s): Throat swelling    Nitroglycerin Palpitations    Penicillins Anaphylaxis    Provigil [Modafinil] Shortness Of Breath     headache    Gadolinium Hives and Itching     Patient was premedicated for the contrast allergy. He did still have a reaction a few hours after injection. Hives and itching. Dr. Gomez told tech to inform pt he should only have contrast again in the future when premedicated and at a hospital. Not at an outpatient facility.     Haemophilus B Polysaccharide Vaccine Rash     Quadrivalent, cell based    Influenza Virus Vaccine Rash     Quadrivalent, cell based    Ketoconazole      Topical cream caused swelling and itching    Dye [Contrast Dye] Other (See Comments) and Hives     Moderate flushing, CT contrast  Iodinated and gadolinium    Formoterol     Gabapentin      Other reaction(s): hives    Golimumab      Hives, bradycardia, face swelling    Mometasone     Naproxen      Other  reaction(s): Bleeding Gums    Neurontin [Gabapentin] Hives     Moderate hives    Nortriptyline Hives    Nystatin Hives    Varicella Virus Vaccine Live      Rash    Adhesive Tape Rash    Baclofen Other (See Comments)     Cognitive changes    Flagyl [Metronidazole Hcl] Palpitations and Hives    Latex Rash    Metronidazole Palpitations, Other (See Comments) and Rash     dizziness (versus ciprofloxacin taken at same time)    Sulfamethizole Rash     Other Reaction(s): Rash      Social History     Tobacco Use    Smoking status: Never    Smokeless tobacco: Never   Substance Use Topics    Alcohol use: Not Currently     Comment: occ 1/week      Wt Readings from Last 1 Encounters:   09/16/24 130.6 kg (288 lb)        Anesthesia Evaluation   Pt has had prior anesthetic. Type: General.    No history of anesthetic complications       ROS/MED HX  ENT/Pulmonary:     (+) sleep apnea,                     asthma               (-) tobacco use   Neurologic:     (+)    peripheral neuropathy, - Polyneuropathy NOS.                           Cardiovascular:     (+)  hypertension- -   -  - -                                      METS/Exercise Tolerance:     Hematologic: Comments: DVT/PE Hx.    (+) History of blood clots,               Musculoskeletal:   (+)  arthritis,             GI/Hepatic:     (+) GERD,            liver disease,       Renal/Genitourinary:       Endo:     (+) type I DM,         thyroid problem,     Obesity,       Psychiatric/Substance Use: Comment: Bipolar disorder.    (+) psychiatric history anxiety and depression       Infectious Disease:  - neg infectious disease ROS     Malignancy:  - neg malignancy ROS     Other:  - neg other ROS          Physical Exam    Airway      Comment: Thin, full beard.    Mallampati: II   TM distance: > 3 FB   Neck ROM: full   Mouth opening: > 3 cm    Respiratory Devices and Support         Dental       (+) Minor Abnormalities - some fillings, tiny chips      Cardiovascular   cardiovascular  exam normal          Pulmonary   pulmonary exam normal                OUTSIDE LABS:  CBC:   Lab Results   Component Value Date    WBC 12.7 (H) 04/13/2024    WBC 7.0 12/12/2023    HGB 13.9 04/13/2024    HGB 14.5 12/12/2023    HCT 40.8 04/13/2024    HCT 43.7 12/12/2023     04/13/2024     12/12/2023     BMP:   Lab Results   Component Value Date     09/16/2024     04/13/2024    POTASSIUM 4.7 09/16/2024    POTASSIUM 4.0 04/13/2024    CHLORIDE 103 09/16/2024    CHLORIDE 105 04/13/2024    CO2 25 09/16/2024    CO2 22 04/13/2024    BUN 13.2 09/16/2024    BUN 13.9 04/13/2024    CR 1.14 09/16/2024    CR 1.10 04/13/2024    GLC 98 09/16/2024    GLC 94 04/13/2024     COAGS:   Lab Results   Component Value Date    PTT 29 03/13/2015    INR 0.90 03/18/2015     POC:   Lab Results   Component Value Date     (H) 10/15/2011     HEPATIC:   Lab Results   Component Value Date    ALBUMIN 4.7 04/13/2024    PROTTOTAL 7.3 04/13/2024    ALT 14 04/13/2024    AST 27 04/13/2024    GGT 28 03/18/2010    ALKPHOS 61 04/13/2024    BILITOTAL 0.4 04/13/2024     OTHER:   Lab Results   Component Value Date    PH 7.43 06/16/2011    LACT 1.1 07/21/2019    A1C 5.5 06/24/2024    ALYCE 10.0 09/16/2024    PHOS 3.4 03/12/2018    MAG 2.1 08/19/2013    LIPASE 112 07/21/2019    AMYLASE 44 07/01/2019    TSH 2.64 09/16/2024    T4 1.18 08/07/2015    CRP <2.9 10/19/2022    SED 9 04/13/2024       Anesthesia Plan    ASA Status:  3    NPO Status:  NPO Appropriate    Anesthesia Type: General.     - Airway: ETT   Induction: Intravenous, Propofol.   Maintenance: Balanced.        Consents    Anesthesia Plan(s) and associated risks, benefits, and realistic alternatives discussed. Questions answered and patient/representative(s) expressed understanding.     - Discussed:     - Discussed with:  Patient      - Extended Intubation/Ventilatory Support Discussed: No.      - Patient is DNR/DNI Status: No     Use of blood products discussed: No .  "    Postoperative Care    Pain management: IV analgesics, Oral pain medications, Multi-modal analgesia.   PONV prophylaxis: Ondansetron (or other 5HT-3), Dexamethasone or Solumedrol     Comments:    Other Comments: Decadron, Zofran.  Diprivan infusion.  Toradol, Tylenol.  Ketamine (0.5 mg/kg).  Magnesium.    Test dose Ancef per Pharmacy.           Giovany Santos MD    I have reviewed the pertinent notes and labs in the chart from the past 30 days and (re)examined the patient.  Any updates or changes from those notes are reflected in this note.              # Obesity: Estimated body mass index is 35.06 kg/m  as calculated from the following:    Height as of 9/16/24: 1.93 m (6' 4\").    Weight as of 9/16/24: 130.6 kg (288 lb).      "

## 2024-09-25 NOTE — CONSULTS
[facility]  Consult Note - Hospitalist Service  Date of Admission:  9/25/2024  Consult Requested by: Luz Araujo PA-C  Reason for Consult: Post op medical management     Assessment & Plan   Joel Pineda is a 49 year old male with PMHx of lumbar radiculopathy, DDD, chronic pain, type 2 diabetes, essential hypertension, hypothyroidism, BPD/YUDI/MDD, insomnia, POTS, IBS, GERD, LLOYD who was admitted on 9/25/2024 s/p bilateral lumbar 4-lumbar 5 hemilaminectomies, medial facetectomies and foraminotomies with lumbar microdiscectomy on the left and lumbar 5-sacral 1 foraminotomies     Lumbar radiculopathy  POD#0 lumbar decompression  -- DVT prophylaxis per surgery  -- Patient does see pain clinic chronically; recommend inpatient pain management consult for discharge medication recommendations  -- Continue PTA methocarbamol, Lyrica; defer further pain control to surgery  -- PT/OT  -- Was straight cathed in PACU x 1 with a 850 mL output; monitor I's and O's closely, bladder scan as needed    Type 2 diabetes mellitus  Diabetic polyneuropathy  A1  5.5%; 6/2024   -- Holding PTA Ozempic  -- Low sliding scale  -- Glucose checks; hypoglycemia protocol    Essential hypertension  -- Continue PTA metoprolol and lisinopril (substituted for ramipril) with hold parameters  -- Close monitoring  -- Ensure good pain control    BPD, YUDI, MDD  -- continue PTA bupropion, clonazepam, Depakote, duloxetine, Mucinex  -- Hold PTA PRN Risperdal in postoperative state    Insomnia - holding PTA Lunesta in the postoperative state     Hypothyroidism - continue PTA levothyroxine  Intermittent asthma - continue PTA inhalers  POTS - cont PTA pyridostigmine   Migraine - cont PTA riboflavin    IBS - on bowel regimen per neurosurgery; hold PTA Colace for now  GERD -- Continue PTA famotidine, omeprazole  Hyperlipidemia - continue PTA statin, fenofibrate  Allergies - continue PTA Allegra, Flonase, montelukast  LLOYD - home CPAP     The  "patient's care was discussed with the Attending Physician, Dr. Rosemary Martinez who independently met with and assessed the patient and is in agreement with the assessment and plan     Clinically Significant Risk Factors Present on Admission                  # Hypertension: Noted on problem list         # Obesity: Estimated body mass index is 34.45 kg/m  as calculated from the following:    Height as of 9/16/24: 1.93 m (6' 4\").    Weight as of this encounter: 128.4 kg (283 lb).       # Asthma: noted on problem list        Rossy Kwan PA-C  Hospitalist Service  Securely message with Vocera (more info)  Text page via [facility] Paging/Directory   ______________________________________________________________________    Chief Complaint   Postoperative medical management    History is obtained from the patient    History of Present Illness   Joel Pineda is a 49 year old male  with PMHx of lumbar radiculopathy, DDD, chronic pain, type 2 diabetes, essential hypertension, hypothyroidism, BPD/YUDI/MDD, insomnia, POTS, IBS, GERD, LLOYD who was admitted on 9/25/2024 s/p bilateral lumbar 4-lumbar 5 hemilaminectomies, medial facetectomies and foraminotomies with lumbar microdiscectomy on the left and lumbar 5-sacral 1 foraminotomies.  Postoperatively patient is doing well.  Reports 5/10 back pain, has received Tylenol and states that ice packs are helping.  Denies any nausea.  States he did need to be straight cathed in the PACU x 1 and does report a history of issues with urinary retention following previous back surgeries in the past.  Denies any numbness or tingling down toes, but does report some intermittent \"bee sting\" type pains to bilateral lower extremities.  Patient states he uses CPAP at home and is compliant with this.  States he follows with pain clinic outside of the hospital, is inquiring about medications for discharge, discussed this would be followed by surgical team and recommend pain management consult.    Past " Medical History    Past Medical History:   Diagnosis Date    Acne     Acquired hypothyroidism     Allergic state     Ankylosing spondylitis lumbar region (H)     Ankylosing spondylitis of sacral region (H)     Anxiety     Bipolar 2 disorder (H)     Chest pain     Chest pain, regulated w/BP meds. Clear arteries.    Chronic pain     Chronic, continuous use of opioids     DDD (degenerative disc disease), lumbar     Depressive disorder     Diabetes (H)     Diverticulosis     Dysautonomia (H)     Facet arthritis of cervical region     Gastroesophageal reflux disease     Hypertension     IBS (irritable bowel syndrome)     Intracranial arachnoid cyst     Lumbar radiculopathy     Major depressive disorder, recurrent episode (H24)     Multiple psych providers - they manage meds August 2015: Provigil induced severe mood dis-function     Major depressive disorder, recurrent episode, severe (H) 12/02/2020    MDD (major depressive disorder), recurrent severe, without psychosis (H) 07/21/2021    Mixed dyslipidemia 04/06/2023    Obese     LLOYD (obstructive sleep apnea)- mild (AHI 11)     Polyneuropathy     POTS (postural orthostatic tachycardia syndrome)     Pulmonary embolism (H)     Skin exam, screening for cancer 12/03/2013    Sleep apnea     Thrombosis     Uncomplicated asthma        Past Surgical History   Past Surgical History:   Procedure Laterality Date    BACK SURGERY  10/2007    lumbar discectomy L5-S1    BIOPSY  09/15/2020    Colon Adenomas x2    COLONOSCOPY      Note: colonoscopy scheduled with [facility] on Friday, 9/4/15    COSMETIC SURGERY  2012    Nose Exterior - functional    GI SURGERY  08/2013    Sigmoidectomy    HERNIA REPAIR, UMBILICAL  08/23/2011    Dr. Evan whiting    INCISION AND DRAINAGE, ABSCESS, COMPLEX  08/23/2011    umbilical, Dr. Evan Beavers    LAPAROSCOPIC ASSISTED COLECTOMY LEFT (DESCENDING)  08/15/2013    Procedure: LAPAROSCOPIC ASSISTED COLECTOMY LEFT (DESCENDING);  Laparoscopic Hand Assisted  Sigmoid Resection, Mobilization of Splenic Fissure, coloproctoscopy, *Latex Free Room* Anesthesia General with Pain block  ;  Surgeon: Aurora Justice MD;  Location: [facility] OR    NERVE SURGERY  08/18/2011    RF ablation @ L3-S1 @ [facility]    RECONSTRUCT NOSE AND SEPTUM (FUNCTIONAL)  10/14/2011    Procedure:RECONSTRUCT NOSE AND SEPTUM (FUNCTIONAL); Functional Septorhinoplasty, Turbinate Reduction, ; Surgeon:CEDRIC CUEVAS; Location:[facility] OR    SINUS SURGERY  10/01/2001    ethmoidectomy chronic sinusitis    SOFT TISSUE SURGERY  4/7/2022    Hidradenitis Suppurativa surgery       Medications   I have reviewed this patient's current medications          Physical Exam   Vital Signs: Temp: 97.9  F (36.6  C) Temp src: Oral BP: 131/75 Pulse: 77   Resp: 16 SpO2: 93 % O2 Device: None (Room air) Oxygen Delivery: 1 LPM  Weight: 283 lbs 0 oz    Constitutional: awake, alert, no apparent distress, and appears stated age  Respiratory: No increased work of breathing, no accessory muscle use, clear to auscultation bilaterally, no crackles or wheezing  Cardiovascular: Regular rate and rhythm, normal S1 and S2, no S3 or S4, and no murmur noted  Skin: Normal skin color, texture, turgor. No rashes and no jaundice  Musculoskeletal: Wiggles all toes appropriately, 2+ DP pulses. No lower extremity pitting edema present.   Neurologic: Awake, alert.   Neuropsychiatric: Appropriate mood, affect and eye contact. Cooperative.    Medical Decision Making   > 45 minutes was spent on this patient encounter.          Data     I have personally reviewed the following data over the past 24 hrs:    9.3  \   13.7   / 218     137 103 11.2 /  163 (H)   4.3 21 (L) 0.86 \       Imaging results reviewed over the past 24 hrs:   No results found for this or any previous visit (from the past 24 hour(s)).

## 2024-09-25 NOTE — ANESTHESIA CARE TRANSFER NOTE
Patient: Joel Pineda    Procedure: Procedure(s):  bilateral lumbar 4-lumbar 5 hemilaminectomies, medial facetectomies and foraminotomies with left lumbar microdiscectomy and lumbar 5-sacral 1 foraminotomies       Diagnosis: Lumbar radiculopathy [M54.16]  Diagnosis Additional Information: No value filed.    Anesthesia Type:   General     Note:    Oropharynx: oropharynx clear of all foreign objects  Level of Consciousness: awake  Oxygen Supplementation: face mask    Independent Airway: airway patency satisfactory and stable  Dentition: dentition unchanged  Vital Signs Stable: post-procedure vital signs reviewed and stable  Report to RN Given: handoff report given  Patient transferred to: PACU    Handoff Report: Identifed the Patient, Identified the Reponsible Provider, Reviewed the pertinent medical history, Discussed the surgical course, Reviewed Intra-OP anesthesia mangement and issues during anesthesia, Set expectations for post-procedure period and Allowed opportunity for questions and acknowledgement of understanding      Vitals:  Vitals Value Taken Time   BP     Temp     Pulse     Resp     SpO2         Electronically Signed By: JOCELYN Zaidi CRNA  September 25, 2024  1:33 PM

## 2024-09-25 NOTE — ANESTHESIA PROCEDURE NOTES
Airway       Patient location during procedure: OR       Procedure Start/Stop Times: 9/25/2024 10:59 AM  Staff -        Performed By: CRNA  Consent for Airway        Urgency: elective  Indications and Patient Condition       Indications for airway management: byron-procedural         Mask difficulty assessment: 1 - vent by mask    Final Airway Details       Final airway type: endotracheal airway       Successful airway: ETT - single  Endotracheal Airway Details        ETT size (mm): 7.5       Cuffed: yes       Cuff volume (mL): 8       Successful intubation technique: direct laryngoscopy       DL Blade Type: MAC 3       Grade View of Cords: 1       Adjucts: stylet       Position: Right       Measured from: gums/teeth       Secured at (cm): 24       Bite block used: None    Post intubation assessment        Placement verified by: capnometry and equal breath sounds        Number of attempts at approach: 1       Secured with: tape       Ease of procedure: easy       Dentition: Intact and Unchanged    Medication(s) Administered   Medication Administration Time: 9/25/2024 10:59 AM

## 2024-09-25 NOTE — BRIEF OP NOTE
[facility]    Brief Operative Note    Pre-operative diagnosis: Lumbar radiculopathy [M54.16]  Post-operative diagnosis Same as pre-operative diagnosis    Procedure: bilateral lumbar 4-lumbar 5 hemilaminectomies, medial facetectomies and foraminotomies with lumbar microdiscectomy on the left and lumbar 5-sacral 1 foraminotomies, Bilateral - Spine    Surgeon: Surgeons and Role:     * Anitha Biggs MD - Primary     * Luz Araujo PA-C - Assisting  Anesthesia: General   Estimated Blood Loss: 75 cc    Drains: Guille-Phipps  Specimens: * No specimens in log *  Findings:   Left sided microdiscectomy only. Diffuse enlargement of epidural plexus. Drain left to prevent hematoma   Complications: None.  Implants: * No implants in log *

## 2024-09-26 ENCOUNTER — APPOINTMENT (OUTPATIENT)
Dept: PHYSICAL THERAPY | Facility: HOSPITAL | Age: 49
End: 2024-09-26
Attending: PHYSICIAN ASSISTANT
Payer: MEDICARE

## 2024-09-26 ENCOUNTER — APPOINTMENT (OUTPATIENT)
Dept: OCCUPATIONAL THERAPY | Facility: HOSPITAL | Age: 49
End: 2024-09-26
Attending: SURGERY
Payer: MEDICARE

## 2024-09-26 ENCOUNTER — APPOINTMENT (OUTPATIENT)
Dept: OCCUPATIONAL THERAPY | Facility: HOSPITAL | Age: 49
End: 2024-09-26
Attending: PHYSICIAN ASSISTANT
Payer: MEDICARE

## 2024-09-26 LAB
ANION GAP SERPL CALCULATED.3IONS-SCNC: 11 MMOL/L (ref 7–15)
BUN SERPL-MCNC: 9.4 MG/DL (ref 6–20)
CALCIUM SERPL-MCNC: 8.9 MG/DL (ref 8.8–10.4)
CHLORIDE SERPL-SCNC: 104 MMOL/L (ref 98–107)
CREAT SERPL-MCNC: 0.88 MG/DL (ref 0.67–1.17)
EGFRCR SERPLBLD CKD-EPI 2021: >90 ML/MIN/1.73M2
ERYTHROCYTE [DISTWIDTH] IN BLOOD BY AUTOMATED COUNT: 12.5 % (ref 10–15)
GLUCOSE BLDC GLUCOMTR-MCNC: 103 MG/DL (ref 70–99)
GLUCOSE BLDC GLUCOMTR-MCNC: 114 MG/DL (ref 70–99)
GLUCOSE BLDC GLUCOMTR-MCNC: 115 MG/DL (ref 70–99)
GLUCOSE BLDC GLUCOMTR-MCNC: 118 MG/DL (ref 70–99)
GLUCOSE BLDC GLUCOMTR-MCNC: 130 MG/DL (ref 70–99)
GLUCOSE SERPL-MCNC: 172 MG/DL (ref 70–99)
HCO3 SERPL-SCNC: 22 MMOL/L (ref 22–29)
HCT VFR BLD AUTO: 37.3 % (ref 40–53)
HGB BLD-MCNC: 12.7 G/DL (ref 13.3–17.7)
MCH RBC QN AUTO: 32.1 PG (ref 26.5–33)
MCHC RBC AUTO-ENTMCNC: 34 G/DL (ref 31.5–36.5)
MCV RBC AUTO: 94 FL (ref 78–100)
PLATELET # BLD AUTO: 201 10E3/UL (ref 150–450)
POTASSIUM SERPL-SCNC: 3.8 MMOL/L (ref 3.4–5.3)
RBC # BLD AUTO: 3.96 10E6/UL (ref 4.4–5.9)
SODIUM SERPL-SCNC: 137 MMOL/L (ref 135–145)
WBC # BLD AUTO: 14.7 10E3/UL (ref 4–11)

## 2024-09-26 PROCEDURE — 36415 COLL VENOUS BLD VENIPUNCTURE: CPT

## 2024-09-26 PROCEDURE — 97535 SELF CARE MNGMENT TRAINING: CPT | Mod: GO

## 2024-09-26 PROCEDURE — 97530 THERAPEUTIC ACTIVITIES: CPT | Mod: GP

## 2024-09-26 PROCEDURE — 250N000013 HC RX MED GY IP 250 OP 250 PS 637

## 2024-09-26 PROCEDURE — 258N000003 HC RX IP 258 OP 636: Performed by: PHYSICIAN ASSISTANT

## 2024-09-26 PROCEDURE — 80048 BASIC METABOLIC PNL TOTAL CA: CPT

## 2024-09-26 PROCEDURE — 250N000013 HC RX MED GY IP 250 OP 250 PS 637: Performed by: SURGERY

## 2024-09-26 PROCEDURE — 250N000011 HC RX IP 250 OP 636: Mod: JZ | Performed by: PHYSICIAN ASSISTANT

## 2024-09-26 PROCEDURE — 99215 OFFICE O/P EST HI 40 MIN: CPT | Performed by: HOSPITALIST

## 2024-09-26 PROCEDURE — 97162 PT EVAL MOD COMPLEX 30 MIN: CPT | Mod: GP

## 2024-09-26 PROCEDURE — 82962 GLUCOSE BLOOD TEST: CPT

## 2024-09-26 PROCEDURE — 999N000157 HC STATISTIC RCP TIME EA 10 MIN

## 2024-09-26 PROCEDURE — 85027 COMPLETE CBC AUTOMATED: CPT

## 2024-09-26 PROCEDURE — 97165 OT EVAL LOW COMPLEX 30 MIN: CPT | Mod: GO

## 2024-09-26 PROCEDURE — 250N000013 HC RX MED GY IP 250 OP 250 PS 637: Performed by: HOSPITALIST

## 2024-09-26 PROCEDURE — 250N000013 HC RX MED GY IP 250 OP 250 PS 637: Performed by: PHYSICIAN ASSISTANT

## 2024-09-26 RX ORDER — SENNOSIDES 8.6 MG
1 TABLET ORAL
Status: DISCONTINUED | OUTPATIENT
Start: 2024-09-26 | End: 2024-09-27 | Stop reason: HOSPADM

## 2024-09-26 RX ADMIN — MONTELUKAST 10 MG: 10 TABLET, FILM COATED ORAL at 21:38

## 2024-09-26 RX ADMIN — PYRIDOSTIGMINE BROMIDE 60 MG: 60 TABLET ORAL at 21:41

## 2024-09-26 RX ADMIN — RISPERIDONE 0.5 MG: 0.5 TABLET, FILM COATED ORAL at 10:09

## 2024-09-26 RX ADMIN — LISINOPRIL 40 MG: 20 TABLET ORAL at 08:49

## 2024-09-26 RX ADMIN — CLONAZEPAM 1 MG: 1 TABLET ORAL at 21:40

## 2024-09-26 RX ADMIN — SENNOSIDES AND DOCUSATE SODIUM 1 TABLET: 8.6; 5 TABLET ORAL at 08:39

## 2024-09-26 RX ADMIN — ACETAMINOPHEN 975 MG: 325 TABLET, FILM COATED ORAL at 10:09

## 2024-09-26 RX ADMIN — PREGABALIN 150 MG: 75 CAPSULE ORAL at 08:39

## 2024-09-26 RX ADMIN — FEXOFENADINE HCL 180 MG: 180 TABLET ORAL at 21:39

## 2024-09-26 RX ADMIN — ACETAMINOPHEN 975 MG: 325 TABLET, FILM COATED ORAL at 01:34

## 2024-09-26 RX ADMIN — FLUTICASONE FUROATE AND VILANTEROL TRIFENATATE 1 PUFF: 100; 25 POWDER RESPIRATORY (INHALATION) at 08:43

## 2024-09-26 RX ADMIN — PYRIDOSTIGMINE BROMIDE 60 MG: 60 TABLET ORAL at 08:39

## 2024-09-26 RX ADMIN — GUAIFENESIN 1200 MG: 600 TABLET ORAL at 08:39

## 2024-09-26 RX ADMIN — METHOCARBAMOL 500 MG: 500 TABLET ORAL at 12:14

## 2024-09-26 RX ADMIN — METHOCARBAMOL 500 MG: 500 TABLET ORAL at 18:05

## 2024-09-26 RX ADMIN — METHOCARBAMOL 500 MG: 500 TABLET ORAL at 06:15

## 2024-09-26 RX ADMIN — THERA TABS 1 TABLET: TAB at 08:40

## 2024-09-26 RX ADMIN — ROSUVASTATIN CALCIUM 40 MG: 40 TABLET, FILM COATED ORAL at 21:40

## 2024-09-26 RX ADMIN — PREGABALIN 150 MG: 75 CAPSULE ORAL at 20:35

## 2024-09-26 RX ADMIN — DIVALPROEX SODIUM 125 MG: 125 CAPSULE, COATED PELLETS ORAL at 21:42

## 2024-09-26 RX ADMIN — METHOCARBAMOL 500 MG: 500 TABLET ORAL at 00:09

## 2024-09-26 RX ADMIN — CEFAZOLIN SODIUM 2 G: 2 INJECTION, SOLUTION INTRAVENOUS at 12:15

## 2024-09-26 RX ADMIN — LEVOTHYROXINE SODIUM 75 MCG: 0.03 TABLET ORAL at 06:14

## 2024-09-26 RX ADMIN — DULOXETINE HYDROCHLORIDE 120 MG: 60 CAPSULE, DELAYED RELEASE PELLETS ORAL at 08:40

## 2024-09-26 RX ADMIN — OXYCODONE HYDROCHLORIDE 10 MG: 5 TABLET ORAL at 19:06

## 2024-09-26 RX ADMIN — BUPROPION HYDROCHLORIDE 300 MG: 150 TABLET, FILM COATED, EXTENDED RELEASE ORAL at 08:40

## 2024-09-26 RX ADMIN — POLYETHYLENE GLYCOL 3350 17 G: 17 POWDER, FOR SOLUTION ORAL at 08:39

## 2024-09-26 RX ADMIN — GUAIFENESIN 1200 MG: 600 TABLET ORAL at 21:39

## 2024-09-26 RX ADMIN — CEFAZOLIN SODIUM 2 G: 2 INJECTION, SOLUTION INTRAVENOUS at 04:00

## 2024-09-26 RX ADMIN — VALACYCLOVIR HYDROCHLORIDE 500 MG: 500 TABLET, FILM COATED ORAL at 08:39

## 2024-09-26 RX ADMIN — METOPROLOL SUCCINATE 200 MG: 100 TABLET, EXTENDED RELEASE ORAL at 08:40

## 2024-09-26 RX ADMIN — PYRIDOSTIGMINE BROMIDE 60 MG: 60 TABLET ORAL at 15:07

## 2024-09-26 RX ADMIN — SODIUM CHLORIDE: 9 INJECTION, SOLUTION INTRAVENOUS at 04:00

## 2024-09-26 RX ADMIN — FAMOTIDINE 20 MG: 20 TABLET ORAL at 08:39

## 2024-09-26 RX ADMIN — FENOFIBRATE 48 MG: 48 TABLET ORAL at 21:40

## 2024-09-26 RX ADMIN — SENNOSIDES 1 TABLET: 8.6 TABLET, FILM COATED ORAL at 12:14

## 2024-09-26 RX ADMIN — OXYCODONE HYDROCHLORIDE 10 MG: 5 TABLET ORAL at 06:14

## 2024-09-26 RX ADMIN — PANTOPRAZOLE SODIUM 40 MG: 40 TABLET, DELAYED RELEASE ORAL at 06:14

## 2024-09-26 RX ADMIN — OXYCODONE HYDROCHLORIDE 10 MG: 5 TABLET ORAL at 15:07

## 2024-09-26 RX ADMIN — OXYCODONE HYDROCHLORIDE 10 MG: 5 TABLET ORAL at 11:08

## 2024-09-26 RX ADMIN — FLUTICASONE PROPIONATE 2 SPRAY: 50 SPRAY, METERED NASAL at 21:38

## 2024-09-26 RX ADMIN — SENNOSIDES AND DOCUSATE SODIUM 1 TABLET: 8.6; 5 TABLET ORAL at 21:40

## 2024-09-26 RX ADMIN — ACETAMINOPHEN 975 MG: 325 TABLET, FILM COATED ORAL at 18:04

## 2024-09-26 RX ADMIN — OXYCODONE HYDROCHLORIDE 5 MG: 5 TABLET ORAL at 02:43

## 2024-09-26 ASSESSMENT — ACTIVITIES OF DAILY LIVING (ADL)
ADLS_ACUITY_SCORE: 36
DEPENDENT_IADLS:: INDEPENDENT
ADLS_ACUITY_SCORE: 36

## 2024-09-26 NOTE — PROGRESS NOTES
09/26/24 1030   Appointment Info   Signing Clinician's Name / Credentials (OT) Xenia Cárdenasantwon OTR/L   Quick Adds   Quick Adds Certification   Living Environment   People in Home alone   Current Living Arrangements house  ([facility])   Home Accessibility stairs within home   Number of Stairs, Within Home, Primary greater than 10 stairs   Transportation Anticipated family or friend will provide   Living Environment Comments bedroom and bathroom upstairs, has bathroom on main level   Self-Care   Usual Activity Tolerance good   Current Activity Tolerance moderate   Equipment Currently Used at Home grab bar, tub/shower   Activity/Exercise/Self-Care Comment Pt normally I with ADL's prior to surgery   General Information   Onset of Illness/Injury or Date of Surgery 09/25/24   Referring Physician Dr Biggs   Patient/Family Therapy Goal Statement (OT) none stated   Additional Occupational Profile Info/Pertinent History of Current Problem bilateral lumbar 4-lumbar 5 hemilaminectomies, medial facetectomies and foraminotomies with left lumbar microdiscectomy and lumbar 5-sacral 1 foraminotomies   1 Day Post-Op   Existing Precautions/Restrictions fall;spinal  (no brace required though pt has one from before surgery)   Cognitive Status Examination   Orientation Status orientation to person, place and time   Affect/Mental Status (Cognitive) WNL   Follows Commands WNL   Pain Assessment   Patient Currently in Pain Yes, see Vital Sign flowsheet  (incisional area, did not rated)   Range of Motion Comprehensive   Comment, General Range of Motion NT   Strength Comprehensive (MMT)   Comment, General Manual Muscle Testing (MMT) Assessment NT   Bed Mobility   Bed Mobility supine-sit;sit-supine;rolling right;scooting/bridging   Rolling Right [place] (Bed Mobility) supervision   Scooting/Bridging [place] (Bed Mobility) supervision   Supine-Sit [place] (Bed Mobility) supervision   Sit-Supine [place] (Bed Mobility)  supervision   Assistive Device (Bed Mobility) bed rails   Transfers   Transfers sit-stand transfer;bed-chair transfer   Transfer Skill: Bed to Chair/Chair to Bed   Bed-Chair [place] (Transfers) supervision   Assistive Device (Bed-Chair Transfers) rolling walker   Sit-Stand Transfer   Sit-Stand [place] (Transfers) supervision   Assistive Device (Sit-Stand Transfers) walker, front-wheeled   Activities of Daily Living   BADL Assessment/Intervention lower body dressing   Lower Body Dressing Assessment/Training   Position (Lower Body Dressing) edge of bed sitting   [place] Level (Lower Body Dressing) doff;don;socks;minimum assist (75% patient effort)   Clinical Impression   Criteria for Skilled Therapeutic Interventions Met (OT) Yes, treatment indicated   OT Diagnosis decreased ADL independence   Influenced by the following impairments pain, weakness, post surgical precautions   OT Problem List-Impairments impacting ADL problems related to;pain;post-surgical precautions;mobility   Assessment of Occupational Performance 3-5 Performance Deficits   Identified Performance Deficits trsfs, toileting, dressing, mobility   Planned Therapy Interventions (OT) ADL retraining;bed mobility training;transfer training   Clinical Decision Making Complexity (OT) problem focused assessment/low complexity   Risk & Benefits of therapy have been explained evaluation/treatment results reviewed;patient   OT Total Evaluation Time   OT Eval, Low Complexity Minutes (37520) 14   Therapy Certification   Medical Diagnosis lumbar radiculopathy   Start of Care Date 09/26/24   Certification date from 09/26/24   Certification date to 10/03/24   OT Goals   Therapy Frequency (OT) 2 times/day   OT Predicted Duration/Target Date for Goal Attainment 10/02/24   OT: Lower Body Dressing Supervision/stand-by assist;within precautions;using adaptive equipment   OT: Bed Mobility Supervision/stand-by assist;supine to/from sitting;rolling   OT:  Transfer Supervision/stand-by assist;within precautions   OT: Toilet Transfer/Toileting Supervision/stand-by assist;toilet transfer;cleaning and garment management   Self-Care/Home Management   Self-Care/Home Mgmt/ADL, Compensatory, Meal Prep Minutes (77917) 10   Treatment Detail/Skilled Intervention Eval completed, treatment initiated.  Pt agreeable to therapy.  Completed bed mob with cues for logrolling and SBA.  After assessing LE dressing, provided additional cues and assist with tech to ease ADL.  Pt able to almost complete figure 4 tech, educated pt on use of reacher to assist which pt has at home, able to doff with reacher with SBA.  Will likely be able to complete by discharge.  Bed trsfs with cues for tech and SBA.  Sit to supine with SBA and cues for logrolling.  Able to bridge I'ly to reposition once supine.  Provided pt with booklet on proper body mechanics.  Pt left with call light and alarm activated.   OT Discharge Planning   OT Discharge Recommendation (DC Rec) home with assist   OT Rationale for DC Rec Pt likely able to return home, lives alone but states family can help periodically as needed   OT Brief overview of current status SBA to CGA trsfs, dressing, bed mob   Total Session Time   Timed Code Treatment Minutes 10   Total Session Time (sum of timed and untimed services) 24    [facility]  OUTPATIENT OCCUPATIONAL THERAPY  EVALUATION  PLAN OF TREATMENT FOR OUTPATIENT REHABILITATION  (COMPLETE FOR INITIAL CLAIMS ONLY)  Patient's Last Name, First Name, M.I.  YOB: 1975  Joel Pineda                          Provider's Name  [facility] Medical Record No.  [id number]                             Onset Date:  09/25/24   Start of Care Date:  09/26/24   Type:     ___PT   _X_OT   ___SLP Medical Diagnosis:  lumbar radiculopathy                    OT Diagnosis:  decreased ADL independence Visits from SOC:  1     See note for plan  of treatment, functional goals and certification details    I CERTIFY THE NEED FOR THESE SERVICES FURNISHED UNDER        THIS PLAN OF TREATMENT AND WHILE UNDER MY CARE     (Physician co-signature of this document indicates review and certification of the therapy plan).

## 2024-09-26 NOTE — CONSULTS
Care Management Initial Consult    General Information  Assessment completed with: Patient,    Type of CM/SW Visit: Initial Assessment    Primary Care Provider verified and updated as needed: Yes   Readmission within the last 30 days: no previous admission in last 30 days         Advance Care Planning: Advance Care Planning Reviewed:  (has the forms)          Communication Assessment  Patient's communication style: spoken language (English or Bilingual)    Hearing Difficulty or Deaf: no   Wear Glasses or Blind: yes    Cognitive  Cognitive/Neuro/Behavioral: WDL  Level of Consciousness: alert  Arousal Level: arouses to voice  Orientation: oriented x 4  Mood/Behavior: calm, cooperative     Speech: clear    Living Environment:   People in home: alone     Current living Arrangements: house      Able to return to prior arrangements: yes       Family/Social Support:  Care provided by: self  Provides care for: no one     Support system: Sibling(s), Parent(s)          Description of Support System: Supportive, Involved         Current Resources:   Patient receiving home care services: No        Community Resources: [facility]  Equipment currently used at home: none  Supplies currently used at home: None    Employment/Financial:  Employment Status: disabled        Financial Concerns:             Does the patient's insurance plan have a 3 day qualifying hospital stay waiver?  Yes     Which insurance plan 3 day waiver is available? [employer]    Will the waiver be used for post-acute placement? Undetermined at this time    Lifestyle & Psychosocial Needs:  Social Determinants of Health     Food Insecurity: Low Risk  (9/25/2024)    Food Insecurity     Within the past 12 months, did you worry that your food would run out before you got money to buy more?: No     Within the past 12 months, did the food you bought just not last and you didn t have money to get more?: No   Depression: At risk (9/16/2024)    PHQ-2     PHQ-2  Score: 3   Housing Stability: Low Risk  (9/25/2024)    Housing Stability     Do you have housing? : Yes     Are you worried about losing your housing?: No   Tobacco Use: Low Risk  (9/25/2024)    Patient History     Smoking Tobacco Use: Never     Smokeless Tobacco Use: Never     Passive Exposure: Not on file   Financial Resource Strain: Low Risk  (9/25/2024)    Financial Resource Strain     Within the past 12 months, have you or your family members you live with been unable to get utilities (heat, electricity) when it was really needed?: No   Alcohol Use: Unknown (4/1/2019)    AUDIT-C     Frequency of Alcohol Consumption: Not on file     Average Number of Drinks: 1 or 2     Frequency of Binge Drinking: Weekly   Transportation Needs: Low Risk  (9/25/2024)    Transportation Needs     Within the past 12 months, has lack of transportation kept you from medical appointments, getting your medicines, non-medical meetings or appointments, work, or from getting things that you need?: No   Physical Activity: Not on file   Interpersonal Safety: Low Risk  (9/25/2024)    Interpersonal Safety     Do you feel physically and emotionally safe where you currently live?: Yes     Within the past 12 months, have you been hit, slapped, kicked or otherwise physically hurt by someone?: No     Within the past 12 months, have you been humiliated or emotionally abused in other ways by your partner or ex-partner?: No   Stress: Not on file   Social Connections: Not on file   Health Literacy: Not on file       Functional Status:  Prior to admission patient needed assistance:   Dependent ADLs:: Independent  Dependent IADLs:: Independent               Discussed  Partnership in Safe Discharge Planning  document with patient/family: No    Additional Information:    Assessment completed with patient. Patient reports he lives alone in his house. He is independent with ADLs/IADLs, ambulates without devices but thinks maybe he will need a walker and  follows with [facility].   Patient reports due to mental health issues, pain and anxiety he is nervous post surgery and going home.  He states interest in possible home care vs TCU at discharge.  Provided [facility] TCU list for him to review. CM to follow up post PT/OT eval.  Patient states his mom is primary family contact.  Transportation at discharge TBD.         Chelsey Zhang RN

## 2024-09-26 NOTE — PLAN OF CARE
Problem: Pain Acute  Goal: Optimal Pain Control and Function  Outcome: Progressing  Intervention: Develop Pain Management Plan  Recent Flowsheet Documentation  Taken 9/26/2024 1108 by Clare Abdul RN  Pain Management Interventions:   medication (see MAR)   cold applied  Taken 9/26/2024 1000 by Clare Abdul RN  Pain Management Interventions:   medication (see MAR)   cold applied  Intervention: Prevent or Manage Pain  Recent Flowsheet Documentation  Taken 9/26/2024 1230 by Clare Abdul RN  Medication Review/Management: medications reviewed  Taken 9/26/2024 0830 by Clare Abdul, RN  Medication Review/Management: medications reviewed   Goal Outcome Evaluation:       Patient rates pain to surgical site at 5-7/10.  Pain is controlled with PRN oxycodone, Robaxin and scheduled tylenol.  Patient has ambulated in hallway with walker x2 and tolerated well.  Good appetite.  Passing gas.  No BM    Temp: 98.9  F (37.2  C) Temp src: Oral BP: 116/67 Pulse: 71   Resp: 18 SpO2: 95 % O2 Device: None (Room air) Oxygen Delivery: 1 LPM

## 2024-09-26 NOTE — PROGRESS NOTES
[facility]    Medicine Progress Note - Hospitalist Service    Date of Admission:  9/25/2024    Assessment & Plan   Joel Pineda is a 49 year old male with PMHx of lumbar radiculopathy, DDD, chronic pain, type 2 diabetes, essential hypertension, hypothyroidism, BPD/YUDI/MDD, insomnia, POTS, IBS, GERD, LLOYD who was admitted on 9/25/2024 s/p bilateral lumbar 4-lumbar 5 hemilaminectomies, medial facetectomies and foraminotomies with lumbar microdiscectomy on the left and lumbar 5-sacral 1 foraminotomies      Lumbar radiculopathy  POD#1 lumbar decompression  -- DVT prophylaxis per surgery  -- Patient does see pain clinic chronically; consider inpatient pain management consult for discharge medication recommendations  -- Continue PTA methocarbamol, Lyrica; defer further pain control to surgery  -- PT/OT  -- Was straight cathed in PACU x 1 with a 850 mL output; monitor I's and O's closely, bladder scan as needed, seems normal 9/26  -- Add second senna dose 9/26     Type 2 diabetes mellitus  Diabetic polyneuropathy  A1  5.5%; 6/2024   -- Holding PTA Ozempic  -- Low sliding scale  -- Glucose checks; hypoglycemia protocol     Leukocytosis   -- Suspect non infectious, likely surgical stress, monitor    Essential hypertension  -- Continue PTA metoprolol and lisinopril (substituted for ramipril) with hold parameters  -- Close monitoring  -- Ensure good pain control     BPD, YUDI, MDD  -- continue PTA bupropion, clonazepam, Depakote, duloxetine, Mucinex  -- Hold PTA PRN Risperdal in postoperative state     Insomnia - holding PTA Lunesta in the postoperative state      Hypothyroidism - continue PTA levothyroxine  Intermittent asthma - continue PTA inhalers  POTS - cont PTA pyridostigmine   Migraine - cont PTA riboflavin    IBS - on bowel regimen per neurosurgery; hold PTA Colace for now  GERD -- Continue PTA famotidine, omeprazole  Hyperlipidemia - continue PTA statin, fenofibrate  Allergies - continue PTA  "Allegra, Flonase, montelukast  LLOYD - home CPAP          Diet: Moderate Consistent Carb (60 g CHO per Meal) Diet    DVT Prophylaxis: Defer to primary service  Angela Catheter: Not present  Lines: None     Cardiac Monitoring: None  Code Status: Full Code      Clinically Significant Risk Factors Present on Admission                  # Hypertension: Noted on problem list         # Obesity: Estimated body mass index is 34.45 kg/m  as calculated from the following:    Height as of 9/16/24: 1.93 m (6' 4\").    Weight as of this encounter: 128.4 kg (283 lb).       # Asthma: noted on problem list        Disposition Plan     Medically Ready for Discharge: Anticipated Tomorrow             Neil Gong MD  Hospitalist Service  [facility]  Securely message with [employer] (more info)  Text page via [employer] Paging/Directory   ______________________________________________________________________    Interval History   Feels okay, has severe pain at times, denies nausea, has intermittent numbness in his feet.  Similar to prior to surgery.  No gas, urinating without issue.    Physical Exam   Vital Signs: Temp: 98.9  F (37.2  C) Temp src: Oral BP: 116/67 Pulse: 71   Resp: 18 SpO2: 95 % O2 Device: None (Room air) Oxygen Delivery: 1 LPM  Weight: 283 lbs 0 oz    Well-appearing, no acute distress, heart and lungs normal, abdomen soft, regular bowel sounds, no leg swelling    Medical Decision Making       46 MINUTES SPENT BY ME on the date of service doing chart review, history, exam, documentation & further activities per the note.  NOTE(S)/MEDICAL RECORDS REVIEWED over the past 24 hours: Vitals, labs, new imaging, documentation and medication administrations       Data     I have personally reviewed the following data over the past 24 hrs:    14.7 (H)  \   12.7 (L)   / 201     137 104 9.4 /  118 (H)   3.8 22 0.88 \       Imaging results reviewed over the past 24 hrs:   Recent Results (from the past 24 hour(s)) "   Lateral Lumbar Spine for Level Confirmation [XR LUMBAR SPINE PORT 1  VIEW]    Narrative    EXAM: XR LUMBAR SPINE PORT 1 VIEW  LOCATION: [facility]  DATE: 9/25/2024    INDICATION: In OR for confirmation of spine level by C arm or hard plate.  COMPARISON: CT abdomen and pelvis dated 03/30/2024.      Impression    IMPRESSION: In the first radiograph (series 3), a needle is pointing to the cranial aspect of the L5 spinous process. In the second radiograph (series 5), a surgical device projects over the L4-L5 interspinous space. No fracture. Normal vertebral heights   and alignment.

## 2024-09-26 NOTE — PROGRESS NOTES
[facility]    Neurosurgery  Daily Note    Assessment & Plan   Procedure(s):  bilateral lumbar 4-lumbar 5 hemilaminectomies, medial facetectomies and foraminotomies with left lumbar microdiscectomy and lumbar 5-sacral 1 foraminotomies   1 Day Post-Op    24 hour events: No acute events overnight.    AM Rounds: Incisional discomfort managed with current pain regimen.  Ongoing but improved preoperative radicular symptoms.  Patient does note new numbness in left first digit of foot.  Incision CDI.  Drain intact.  Patient straight cath in PACU due to urinary retention, has been able to void on own after single straight cath yesterday.   notes history of urinary tension following anesthesia with spontaneous resolution.  Drain output 30 mL overnight.  Has not worked with PT yet.      Plan:  -Keep drain in place, monitor/record output, will reevaluate this afternoon.  -Pain control measures  - Routine wound care  - Advance activity and diet as tolerated  - PT/OT recs pending  - Hospitalist on board    Tentative discharge in next 1 to 2 days pending pain control and PT recommendations.    Plans discussed with Dr. Biggs who was in agreement with plans.    Addendum: Drain output 15 mL 7A-3P today. Remove drain (ordered). PT recommending TCU. OT recommending home with assist. SW consulted - interested in home care vs TCU.     Coby Pettit PA-C  [facility] Neurosurgery  [address] [place] [id number]      Physical Exam   Temp: 98.9  F (37.2  C) Temp src: Oral BP: 116/67 Pulse: 71   Resp: 18 SpO2: 95 % O2 Device: None (Room air) Oxygen Delivery: 1 LPM  Vitals:    09/25/24 0908   Weight: 128.4 kg (283 lb)     Vital Signs with Ranges  Temp:  [96.4  F (35.8  C)-98.9  F (37.2  C)] 98.9  F (37.2  C)  Pulse:  [67-91] 71  Resp:  [10-20] 18  BP: (113-153)/(57-97) 116/67  SpO2:  [93 %-99 %] 95 %  I/O last 3 completed shifts:  In: 1880 [P.O.:680; I.V.:1200]  Out: 3260 [Urine:3150;  Drains:110]      Awake, alert, and appropriate. NAD  Sitting up in hospital bed with nursing at bedside.  BLE strength 5/5  Sensation intact BLE except decrease sensation in left first digit of foot, patient notes baseline bilateral anterior thigh numbness secondary to history of injection   Incision CDI without surrounding erythema/edema/drainage  Drain intact  Calf soft and nontender bilaterally      Medications   Current Facility-Administered Medications   Medication Dose Route Frequency Provider Last Rate Last Admin    sodium chloride 0.9 % infusion   Intravenous Continuous Luz Araujo PA-C 75 mL/hr at 09/26/24 0704 Rate Verify at 09/26/24 0704      Current Facility-Administered Medications   Medication Dose Route Frequency Provider Last Rate Last Admin    acetaminophen (TYLENOL) tablet 975 mg  975 mg Oral Q8H Luz Araujo PA-C   975 mg at 09/26/24 0134    buPROPion (WELLBUTRIN XL) 24 hr tablet 300 mg  300 mg Oral Daily Rossy Kwan PA-C        ceFAZolin (ANCEF) 1,000 mg in sodium chloride 0.9% (bottle) 1,000 mL irrigation   Irrigation On Call to OR Luz Araujo PA-C        ceFAZolin (ANCEF) 2 g in 100 mL D5W intermittent infusion  2 g Intravenous Q8H Luz Araujo PA-C 200 mL/hr at 09/26/24 0400 2 g at 09/26/24 0400    clonazePAM (klonoPIN) tablet 1 mg  1 mg Oral At Bedtime Rossy Kwan PA-C   1 mg at 09/25/24 2131    divalproex sodium delayed-release (DEPAKOTE SPRINKLE) DR capsule 125 mg  125 mg Oral QPM Rossy Kwan PA-C   125 mg at 09/25/24 2130    [Held by provider] docusate sodium (COLACE) capsule 100 mg  100 mg Oral BID Rossy Kwan PA-C        DULoxetine (CYMBALTA) DR capsule 120 mg  120 mg Oral Daily Rossy Kwan PA-C   120 mg at 09/25/24 1935    famotidine (PEPCID) tablet 20 mg  20 mg Oral Daily Rossy Kwan PA-C   20 mg at 09/25/24 1934    fenofibrate (TRICOR) tablet 48 mg  48 mg Oral QPM Aniya, Rossy, PA-C        fexofenadine (ALLEGRA) tablet 180 mg  180 mg Oral QPM Rossy Kwan PA-C   180 mg  at 09/25/24 2132    fluticasone (FLONASE) 50 MCG/ACT spray 2 spray  2 spray Nasal QPM Rossy Kwan PA-C   2 spray at 09/25/24 2132    fluticasone-vilanterol (BREO ELLIPTA) 100-25 MCG/ACT inhaler 1 puff  1 puff Inhalation Daily Rossy Kwan PA-C        guaiFENesin (MUCINEX) 12 hr tablet 1,200 mg  1,200 mg Oral BID Rossy Kwan PA-C   1,200 mg at 09/25/24 2131    insulin aspart (NovoLOG) injection (RAPID ACTING)  1-3 Units Subcutaneous TID  Rossy Kawn PA-C        insulin aspart (NovoLOG) injection (RAPID ACTING)  1-3 Units Subcutaneous At Bedtime Rossy Kwan PA-C        levothyroxine (SYNTHROID/LEVOTHROID) tablet 75 mcg  75 mcg Oral QAM  Rossy Kwan PA-C   75 mcg at 09/26/24 0614    lisinopril (ZESTRIL) tablet 40 mg  40 mg Oral Daily Rossy Kwan PA-C        methocarbamol (ROBAXIN) tablet 500 mg  500 mg Oral Q6H Luz Araujo PA-C   500 mg at 09/26/24 0615    metoprolol succinate ER (TOPROL XL) 24 hr tablet 100 mg  100 mg Oral QPM Rossy Kwan PA-C   100 mg at 09/25/24 2131    metoprolol succinate ER (TOPROL XL) 24 hr tablet 200 mg  200 mg Oral Daily Rossy Kwan PA-C        montelukast (SINGULAIR) tablet 10 mg  10 mg Oral QPM Rossy Kwan PA-C   10 mg at 09/25/24 2131    multivitamin, therapeutic (THERA-VIT) tablet 1 tablet  1 tablet Oral Daily Luz Araujo PA-C        pantoprazole (PROTONIX) EC tablet 40 mg  40 mg Oral QAM AC Anitha Biggs MD   40 mg at 09/26/24 0614    polyethylene glycol (MIRALAX) Packet 17 g  17 g Oral Daily Luz Araujo PA-C        pregabalin (LYRICA) capsule 150 mg  150 mg Oral BID Luz Araujo PA-C   150 mg at 09/25/24 1934    pyRIDostigmine (MESTINON) tablet 60 mg  60 mg Oral TID Rossy Kwan PA-C   60 mg at 09/25/24 2131    rosuvastatin (CRESTOR) tablet 40 mg  40 mg Oral QPM Rossy Kwan PA-C   40 mg at 09/25/24 2132    senna-docusate (SENOKOT-S/PERICOLACE) 8.6-50 MG per tablet 1 tablet  1 tablet Oral BID Luz Araujo PA-C   1 tablet at 09/25/24 2131    valACYclovir  (VALTREX) tablet 500 mg  500 mg Oral Daily Rossy Kwan PA-C   500 mg at 09/25/24 6260

## 2024-09-26 NOTE — PLAN OF CARE
Problem: Adult Inpatient Plan of Care  Goal: Optimal Comfort and Wellbeing  9/26/2024 0429 by Consuelo Agosto, RN  Outcome: Progressing     Problem: Pain Acute  Goal: Optimal Pain Control and Function  9/26/2024 0429 by Consuelo Agosto, RN  Outcome: Progressing     Problem: Surgery Nonspecified  Goal: Effective Urinary Elimination  9/26/2024 0429 by Consuelo Agosto, RN  Outcome: Progressing   Goal Outcome Evaluation:       Pt A&Ox4, able to make needs known appropriately. Lumbar spine surgical dressing CDI. SACHI drain intact with bloody/bright red drainage. CMS intact, no changes per baseline. Voiding without difficulty, urinal at bedside. PRN oxycodone 5 mg PO given around 0230 for break-through pain; effective. Scheduled tylenol and methocarbamol given with good effect. Ice packs also used PRN.

## 2024-09-26 NOTE — PLAN OF CARE
PRIMARY DIAGNOSIS: ACUTE PAIN  OUTPATIENT/OBSERVATION GOALS TO BE MET BEFORE DISCHARGE:  1. Pain Status: Improved-controlled with oral pain medications.    2. Return to near baseline physical activity: No    3. Cleared for discharge by consultants (if involved): No    Discharge Planner Nurse   Safe discharge environment identified: No  Barriers to discharge: Yes       Entered by: Oluwadamilola Adegun, RN 09/25/2024 11:15 PM     Please review provider order for any additional goals.   Nurse to notify provider when observation goals have been met and patient is ready for discharge.Goal Outcome Evaluation: Pt alert and oriented, able to make needs known. VSS, pain managed with PRN oxy. Straight cath X1.

## 2024-09-26 NOTE — PLAN OF CARE
PRIMARY DIAGNOSIS: ACUTE PAIN  OUTPATIENT/OBSERVATION GOALS TO BE MET BEFORE DISCHARGE:  1. Pain Status: Improved-controlled with oral pain medications.    2. Return to near baseline physical activity: No    3. Cleared for discharge by consultants (if involved): No    Discharge Planner Nurse   Safe discharge environment identified: No  Barriers to discharge: No       Entered by: Oluwadamilola Adegun, RN 09/25/2024 11:16 PM     Please review provider order for any additional goals.   Nurse to notify provider when observation goals have been met and patient is ready for discharge.Goal Outcome Evaluation: Pt alert and oriented, able to make needs known. Ambulated A1 to bathroom. Dressing on lower back CDI, P drain in place, 50ml output this shift. NS running at 75ml/hr. Home CPAP in use.

## 2024-09-26 NOTE — PROGRESS NOTES
09/26/24 0926   Appointment Info   Signing Clinician's Name / Credentials (PT) Jose E Raines, PT, DPT   Rehab Comments (PT) Patient left lying supine with RN present and needs within reach.   Quick Adds   Quick Adds Certification   Living Environment   People in Home alone   Current Living Arrangements   ([facility])   Home Accessibility stairs within home   Number of Stairs, Within Home, Primary greater than 10 stairs   Stair Railings, Within Home, Primary   (both sides for half the stairs, one rail for other half)   Living Environment Comments Bedroom and full bathroom on upper level.   Self-Care   Equipment Currently Used at Home none  (has bed rails)   Fall history within last six months yes   Number of times patient has fallen within last six months 2  (slipped down the stairs, fell in garage)   Activity/Exercise/Self-Care Comment Patient is typically indep with mobility without AD, indep with bADL and IADL including driving. On disability due to his back and SPMI.   General Information   Onset of Illness/Injury or Date of Surgery 09/25/24   Referring Physician Luz Araujo PA-C   Pertinent History of Current Problem (include personal factors and/or comorbidities that impact the POC) bilateral lumbar 4-lumbar 5 hemilaminectomies, medial facetectomies and foraminotomies with left lumbar microdiscectomy and lumbar 5-sacral 1 foraminotomies   Existing Precautions/Restrictions fall;spinal   Pain Assessment   Patient Currently in Pain Yes, see Vital Sign flowsheet   Range of Motion (ROM)   Range of Motion ROM deficits secondary to pain   Strength (Manual Muscle Testing)   Strength (Manual Muscle Testing) Deficits observed during functional mobility   Bed Mobility   Bed Mobility supine-sit;sit-supine   Supine-Sit [place] (Bed Mobility) supervision;verbal cues;nonverbal cues (demo/gesture)   Sit-Supine [place] (Bed Mobility) supervision;verbal cues;nonverbal cues (demo/gesture)   Assistive Device (Bed  Mobility) bed rails   Transfers   Transfers sit-stand transfer   Sit-Stand Transfer   Sit-Stand [place] (Transfers) contact guard;verbal cues;nonverbal cues (demo/gesture)   Assistive Device (Sit-Stand Transfers) walker, front-wheeled   Gait/Stairs (Locomotion)   [place] Level (Gait) contact guard   Assistive Device (Gait) walker, front-wheeled   Distance in Feet (Gait) initial 10ft   Negotiation (Stairs) stairs independence;handrail location;number of steps   [place] Level (Stairs) contact guard;verbal cues   Handrail Location (Stairs) both sides   Number of Steps (Stairs) 4   Clinical Impression   Criteria for Skilled Therapeutic Intervention Yes, treatment indicated   PT Diagnosis (PT) impaired functional mobility   Influenced by the following impairments impaired balance, pain, impaired activity tolerance   Functional limitations due to impairments impaired bed mobility, impaired transfers, impaired gait, impaired stairs   Clinical Presentation (PT Evaluation Complexity) evolving   Clinical Presentation Rationale clinical judgement   Clinical Decision Making (Complexity) moderate complexity   Planned Therapy Interventions (PT) balance training;bed mobility training;gait training;home exercise program;neuromuscular re-education;patient/family education;stair training;strengthening;transfer training;progressive activity/exercise;risk factor education;lumbar stabilization   Risk & Benefits of therapy have been explained evaluation/treatment results reviewed;care plan/treatment goals reviewed;participants voiced agreement with care plan;participants included;patient   PT Total Evaluation Time   PT Shadia, Moderate Complexity Minutes (63598) 12   Therapy Certification   Start of care date 09/26/24   Certification date from 09/26/24   Certification date to 10/03/24   Medical Diagnosis bilateral lumbar 4-lumbar 5 hemilaminectomies, medial facetectomies and foraminotomies with left lumbar microdiscectomy  "and lumbar 5-sacral 1 foraminotomies   Physical Therapy Goals   PT Frequency Daily   PT Predicted Duration/Target Date for Goal Attainment 10/03/24   PT Goals Bed Mobility;Transfers;Gait;Stairs   PT: Bed Mobility Modified independent;Supine to/from sit;Within precautions  (log roll)   PT: Transfers Modified independent;Sit to/from stand;Bed to/from chair;Assistive device;Within precautions  (FWW)   PT: Gait Modified independent;Rolling walker;Greater than 200 feet;Within precautions  (FWW)   PT: Stairs Modified independent;Within precautions;Greater than 10 stairs  (one rail)   Interventions   Interventions Quick Adds Therapeutic Activity   Therapeutic Activity   Therapeutic Activities: dynamic activities to improve functional performance Minutes (86223) 35   Symptoms Noted During/After Treatment Fatigue   Treatment Detail/Skilled Intervention Patient disclosed to PT that he does endure mental health challenges daily. PT noted throughout session that patient's anxiety and mental health was impacting his comfort and confidence with mobility following surgery. Patient tolerated ambulating additional increased distances with FWW and bFWW with SBA (150ft x 2 FWW+ 50ft bFWW); however, upon return to the room his BLE were noticeably tremulous and patient reported fear of falling due to fatigue and \"shakiness.\" Patient able to perform transfers throughout session with FWW and occasional cues for hand placement with fair carryover during session. Patient required increased attempts for bed mobility via log roll with cues for technique to decrease pain and increased independence. Patient discussed his concern regarding being home alone and managing his self care tasks and mobility, with hope for some continued therapies prior to dc home. Discussed dc home vs TCU and recommendations. Discussed FWW vs bFWW and ability to issue it through insurance here vs purchasing himself through [employer]/other locations. All of patient's " questions were answered to his satisfaction.   PT Discharge Planning   PT Plan activity tolerance, review log roll technique, stairs, issue FWW vs bFWW is dc home   PT Discharge Recommendation (DC Rec) Transitional Care Facility   PT Rationale for DC Rec Currently, patient requires supervision and cues with mobility with FWW/bFWW and is only tolerating ascending/descending 4 stairs with B rail. Patient's mental health is currently impacting his comfort and confidence with independence due to his fatigue, increased pain, and spinal precautions. Patient is highly motivated to participate in therapies and improve functionally. Currently recommend TCU; however, pending progress with independence with mobility and stairs, patient may be able to transition home with home PT.   PT Brief overview of current status SBA log roll with bed rail, SBA transfers FWW/bFWW, SBA amb 150ft FWW, 4 stairs B rail CGA>SBA with noted fatigue   PT Equipment Needed at Discharge walker, rolling  (will need FWW vs bFWW issued at discharge)   Total Session Time   Timed Code Treatment Minutes 35   Total Session Time (sum of timed and untimed services) 47       [facility]  OUTPATIENT PHYSICAL THERAPY EVALUATION  PLAN OF TREATMENT FOR OUTPATIENT REHABILITATION  (COMPLETE FOR INITIAL CLAIMS ONLY)  Patient's Last Name, First Name, M.I.  YOB: 1975  Joel Pineda                        Provider's Name  [facility] Medical Record No.  [id number]                             Onset Date:  09/25/24   Start of Care Date:  09/26/24   Type:     _X_PT   ___OT   ___SLP Medical Diagnosis:  bilateral lumbar 4-lumbar 5 hemilaminectomies, medial facetectomies and foraminotomies with left lumbar microdiscectomy and lumbar 5-sacral 1 foraminotomies              PT Diagnosis:  impaired functional mobility Visits from SOC:  1     See note for plan of treatment, functional goals and  certification details    I CERTIFY THE NEED FOR THESE SERVICES FURNISHED UNDER        THIS PLAN OF TREATMENT AND WHILE UNDER MY CARE     (Physician co-signature of this document indicates review and certification of the therapy plan).

## 2024-09-27 ENCOUNTER — APPOINTMENT (OUTPATIENT)
Dept: PHYSICAL THERAPY | Facility: HOSPITAL | Age: 49
End: 2024-09-27
Attending: SURGERY
Payer: MEDICARE

## 2024-09-27 ENCOUNTER — APPOINTMENT (OUTPATIENT)
Dept: OCCUPATIONAL THERAPY | Facility: HOSPITAL | Age: 49
End: 2024-09-27
Attending: SURGERY
Payer: MEDICARE

## 2024-09-27 VITALS
WEIGHT: 283 LBS | HEART RATE: 94 BPM | DIASTOLIC BLOOD PRESSURE: 56 MMHG | BODY MASS INDEX: 34.45 KG/M2 | OXYGEN SATURATION: 98 % | TEMPERATURE: 98.5 F | RESPIRATION RATE: 20 BRPM | SYSTOLIC BLOOD PRESSURE: 107 MMHG

## 2024-09-27 LAB
GLUCOSE BLDC GLUCOMTR-MCNC: 102 MG/DL (ref 70–99)
GLUCOSE BLDC GLUCOMTR-MCNC: 104 MG/DL (ref 70–99)
GLUCOSE BLDC GLUCOMTR-MCNC: 94 MG/DL (ref 70–99)

## 2024-09-27 PROCEDURE — 97530 THERAPEUTIC ACTIVITIES: CPT | Mod: GP

## 2024-09-27 PROCEDURE — 99215 OFFICE O/P EST HI 40 MIN: CPT | Performed by: HOSPITALIST

## 2024-09-27 PROCEDURE — 250N000011 HC RX IP 250 OP 636: Performed by: PHYSICIAN ASSISTANT

## 2024-09-27 PROCEDURE — 250N000013 HC RX MED GY IP 250 OP 250 PS 637: Performed by: PHYSICIAN ASSISTANT

## 2024-09-27 PROCEDURE — 97110 THERAPEUTIC EXERCISES: CPT | Mod: GP

## 2024-09-27 PROCEDURE — 82962 GLUCOSE BLOOD TEST: CPT

## 2024-09-27 PROCEDURE — 97535 SELF CARE MNGMENT TRAINING: CPT | Mod: GO

## 2024-09-27 PROCEDURE — 250N000013 HC RX MED GY IP 250 OP 250 PS 637: Performed by: SURGERY

## 2024-09-27 PROCEDURE — 97116 GAIT TRAINING THERAPY: CPT | Mod: GP

## 2024-09-27 PROCEDURE — 250N000013 HC RX MED GY IP 250 OP 250 PS 637

## 2024-09-27 PROCEDURE — 250N000013 HC RX MED GY IP 250 OP 250 PS 637: Performed by: HOSPITALIST

## 2024-09-27 PROCEDURE — 999N000157 HC STATISTIC RCP TIME EA 10 MIN

## 2024-09-27 RX ORDER — OXYCODONE HYDROCHLORIDE 5 MG/1
5 TABLET ORAL EVERY 6 HOURS PRN
Qty: 60 TABLET | Refills: 0 | Status: SHIPPED | OUTPATIENT
Start: 2024-09-27

## 2024-09-27 RX ORDER — AMOXICILLIN 250 MG
1 CAPSULE ORAL 2 TIMES DAILY PRN
Qty: 40 TABLET | Refills: 0 | Status: SHIPPED | OUTPATIENT
Start: 2024-09-27

## 2024-09-27 RX ADMIN — PREGABALIN 150 MG: 75 CAPSULE ORAL at 08:09

## 2024-09-27 RX ADMIN — FAMOTIDINE 20 MG: 20 TABLET ORAL at 08:07

## 2024-09-27 RX ADMIN — METHOCARBAMOL 500 MG: 500 TABLET ORAL at 00:05

## 2024-09-27 RX ADMIN — PANTOPRAZOLE SODIUM 40 MG: 40 TABLET, DELAYED RELEASE ORAL at 06:33

## 2024-09-27 RX ADMIN — OXYCODONE HYDROCHLORIDE 10 MG: 5 TABLET ORAL at 11:19

## 2024-09-27 RX ADMIN — METHOCARBAMOL 500 MG: 500 TABLET ORAL at 05:46

## 2024-09-27 RX ADMIN — SENNOSIDES 1 TABLET: 8.6 TABLET, FILM COATED ORAL at 11:19

## 2024-09-27 RX ADMIN — THERA TABS 1 TABLET: TAB at 08:09

## 2024-09-27 RX ADMIN — HYDROMORPHONE HYDROCHLORIDE 0.2 MG: 0.2 INJECTION, SOLUTION INTRAMUSCULAR; INTRAVENOUS; SUBCUTANEOUS at 00:06

## 2024-09-27 RX ADMIN — GUAIFENESIN 1200 MG: 600 TABLET ORAL at 08:10

## 2024-09-27 RX ADMIN — BUPROPION HYDROCHLORIDE 300 MG: 150 TABLET, FILM COATED, EXTENDED RELEASE ORAL at 08:08

## 2024-09-27 RX ADMIN — DULOXETINE HYDROCHLORIDE 120 MG: 60 CAPSULE, DELAYED RELEASE PELLETS ORAL at 08:09

## 2024-09-27 RX ADMIN — SENNOSIDES AND DOCUSATE SODIUM 1 TABLET: 8.6; 5 TABLET ORAL at 08:08

## 2024-09-27 RX ADMIN — FLUTICASONE FUROATE AND VILANTEROL TRIFENATATE 1 PUFF: 100; 25 POWDER RESPIRATORY (INHALATION) at 08:07

## 2024-09-27 RX ADMIN — OXYCODONE HYDROCHLORIDE 10 MG: 5 TABLET ORAL at 08:08

## 2024-09-27 RX ADMIN — LEVOTHYROXINE SODIUM 75 MCG: 0.03 TABLET ORAL at 06:33

## 2024-09-27 RX ADMIN — OXYCODONE HYDROCHLORIDE 10 MG: 5 TABLET ORAL at 14:39

## 2024-09-27 RX ADMIN — VALACYCLOVIR HYDROCHLORIDE 500 MG: 500 TABLET, FILM COATED ORAL at 08:08

## 2024-09-27 RX ADMIN — OXYCODONE HYDROCHLORIDE 10 MG: 5 TABLET ORAL at 04:43

## 2024-09-27 RX ADMIN — POLYETHYLENE GLYCOL 3350 17 G: 17 POWDER, FOR SOLUTION ORAL at 08:07

## 2024-09-27 RX ADMIN — PYRIDOSTIGMINE BROMIDE 60 MG: 60 TABLET ORAL at 08:08

## 2024-09-27 RX ADMIN — LISINOPRIL 40 MG: 20 TABLET ORAL at 08:08

## 2024-09-27 RX ADMIN — METOPROLOL SUCCINATE 200 MG: 100 TABLET, EXTENDED RELEASE ORAL at 08:09

## 2024-09-27 RX ADMIN — ACETAMINOPHEN 975 MG: 325 TABLET, FILM COATED ORAL at 01:17

## 2024-09-27 RX ADMIN — METHOCARBAMOL 500 MG: 500 TABLET ORAL at 11:19

## 2024-09-27 RX ADMIN — ACETAMINOPHEN 975 MG: 325 TABLET, FILM COATED ORAL at 08:11

## 2024-09-27 ASSESSMENT — ACTIVITIES OF DAILY LIVING (ADL)
ADLS_ACUITY_SCORE: 36
ADLS_ACUITY_SCORE: 36
ADLS_ACUITY_SCORE: 38
ADLS_ACUITY_SCORE: 36
ADLS_ACUITY_SCORE: 38
ADLS_ACUITY_SCORE: 36
ADLS_ACUITY_SCORE: 38

## 2024-09-27 NOTE — DISCHARGE SUMMARY
DISCHARGE SUMMARY   Patient ID:   Joel Pineda   [id number]   49 year old   1975     Admit date: 9/25/2024     Discharge date: 09/27/2024    Admission Diagnoses: Lumbar radiculopathy [M54.16]     Procedure:     Bilateral lumbar 4-lumbar 5 hemilaminectomies, medial facetectomies and foraminotomies with lumbar microdiscectomy on the left and lumbar 5-sacral 1 foraminotomies, Bilateral - Spine     Post op Diagnosis:     Lumbar disc herniation  Lumbar radiculopathy      Surgeon: Dr. Biggs    Exam:   Pt in bed. He appears comfortable and in no apparent distress, moving all extremities.   CN II-XII intact, alert and appropriate with conversation and following commands.   Bilateral upper and lower extremities with appropriate strength. DTR's WNL. Spine is non tender to palpation throughout. Johnson's and Babinski sign neg. Sensation intact throughout. Acceptable ROM.   Calves soft and non-tender bilaterally.   Back dressing changed. Incision intact. Absent for edema, erythema or drainage.   He is ambulating with a steady gait and without assistive devices. Tolerating regular diet without nausea or vomiting. Pain managed with oral medication. Voiding without difficulty. He is on room air with O2 sats greater than 90%.     Disposition: Home     Joel Pineda verbalizes understanding and is in agreement with discharge.     Done while pt still in hospital bed      Review of your medicines        START taking        Dose / Directions   senna-docusate 8.6-50 MG tablet  Commonly known as: SENOKOT-S/PERICOLACE  Used for: History of lumbar surgery      Dose: 1 tablet  Take 1 tablet by mouth 2 times daily as needed for constipation.  Quantity: 40 tablet  Refills: 0            CONTINUE these medicines which may have CHANGED, or have new prescriptions. If we are uncertain of the size of tablets/capsules you have at home, strength may be listed as something that might have changed.        Dose / Directions   oxyCODONE 5 MG  "tablet  Commonly known as: ROXICODONE  This may have changed: when to take this  Used for: DDD (degenerative disc disease), lumbar, Lumbar facet arthropathy, Chronic intractable pain, Intractable migraine with aura with status migrainosus, Ankylosing spondylitis of sacral region (H), Peripheral polyneuropathy      Dose: 5 mg  Take 1 tablet (5 mg) by mouth every 6 hours as needed for breakthrough pain. Ok to fill 09/21/24 and begin using on 09/23/24. 30 days supply for chronic pain  Quantity: 60 tablet  Refills: 0            CONTINUE these medicines which have NOT CHANGED        Dose / Directions   acetaminophen 500 MG Caps      Dose: 500 mg  Take 500 mg by mouth every morning.  Refills: 0     albuterol 108 (90 Base) MCG/ACT inhaler  Commonly known as: PROAIR HFA/PROVENTIL HFA/VENTOLIN HFA  Used for: Mild persistent asthma, unspecified whether complicated      Dose: 2 puff  Inhale 2 puffs into the lungs every 6 hours as needed for shortness of breath, wheezing or cough 90 day supply  Quantity: 25.5 g  Refills: 3     B-D LUER-LUCILLE SYRINGE 25G X 1\" 3 ML Misc  Used for: B12 deficiency  Generic drug: syringe/needle (disp)      USE WITH B12 INJECTIONS  Quantity: 12 each  Refills: 0     bisacodyl 5 MG EC tablet  Commonly known as: DULCOLAX      Dose: 10 mg  Take 10 mg by mouth as needed for constipation  Refills: 0     BOTOX IJ      Refills: 0     buPROPion 300 MG 24 hr tablet  Commonly known as: WELLBUTRIN XL      Dose: 1 tablet  Take 1 tablet by mouth daily  Refills: 0     cholecalciferol 1250 mcg (07795 units) capsule  Commonly known as: D3-50  Used for: Vitamin D deficiency      TAKE ONE CAPSULE BY MOUTH EVERY 2 WEEKS  Quantity: 24 capsule  Refills: 0     * clonazePAM 0.5 MG tablet  Commonly known as: klonoPIN      Dose: 0.5 mg  Take 0.5 mg by mouth daily as needed for anxiety.  Refills: 0     * clonazePAM 0.5 MG tablet  Commonly known as: klonoPIN      Dose: 1 mg  Take 1 mg by mouth at bedtime.  Refills: 0   "   cyanocobalamin 1000 mcg/mL injection  Commonly known as: CYANOCOBALAMIN  Used for: B12 deficiency      INJECT 1 ML INTO THE MUSCLE EVERY 30 DAYS  Quantity: 10 mL  Refills: 0     diphenhydrAMINE 25 MG capsule  Commonly known as: BENADRYL      Dose: 25 mg  Take 25 mg by mouth once a week Before Enbrel injection  Refills: 0     divalproex sodium delayed-release 125 MG DR tablet  Commonly known as: DEPAKOTE      Dose: 125 mg  Take 125 mg by mouth every evening.  Refills: 0     docusate sodium 50 MG capsule  Commonly known as: COLACE      Dose: 100 mg  Take 100 mg by mouth 2 times daily  Refills: 0     DULoxetine 60 MG capsule  Commonly known as: CYMBALTA      Dose: 120 mg  Take 120 mg by mouth daily  Refills: 0     Enbrel SureClick 50 MG/ML autoinjector  Used for: Ankylosing spondylitis, unspecified site of spine (H)  Generic drug: etanercept      Dose: 50 mg  Inject 50 mg Subcutaneous once a week . Hold for signs of infection, and seek medical attention.  Quantity: 4 mL  Refills: 7     EPINEPHrine 0.3 MG/0.3ML injection 2-pack  Commonly known as: ANY BX GENERIC EQUIV  Used for: Food allergy      Dose: 0.3 mg  Inject 0.3 mLs (0.3 mg) into the muscle as needed for anaphylaxis May repeat one time in 5-15 minutes if response to initial dose is inadequate.  Quantity: 2 each  Refills: 3     eszopiclone 3 MG tablet  Commonly known as: LUNESTA      Dose: 3 mg  Take 3 mg by mouth At Bedtime  Refills: 0     famotidine 20 MG tablet  Commonly known as: PEPCID      Dose: 20 mg  Take 20 mg by mouth daily  Refills: 0     fenofibrate 48 MG tablet  Commonly known as: TRICOR  Used for: Hypertriglyceridemia      Dose: 48 mg  TAKE ONE TABLET BY MOUTH ONCE DAILY  Quantity: 90 tablet  Refills: 2     fexofenadine 180 MG tablet  Commonly known as: ALLEGRA      Dose: 180 mg  Take 180 mg by mouth every evening.  Refills: 0     fluticasone 50 MCG/ACT nasal spray  Commonly known as: FLONASE      Dose: 2 spray  Spray 2 sprays in nostril every  evening.  Refills: 0     fluticasone-salmeterol 100-50 MCG/ACT inhaler  Commonly known as: Wixela Inhub  Used for: Mild persistent asthma, unspecified whether complicated      Dose: 1 puff  Inhale 1 puff into the lungs every 12 hours  Quantity: 3 each  Refills: 3     guaiFENesin 600 MG 12 hr tablet  Commonly known as: MUCINEX      Dose: 1,200 mg  Take 1,200 mg by mouth 2 times daily.  Refills: 0     levothyroxine 75 MCG tablet  Commonly known as: SYNTHROID/LEVOTHROID  Used for: Acquired hypothyroidism      TAKE ONE TABLET BY MOUTH EVERY MORNING  Quantity: 90 tablet  Refills: 2     lidocaine 5 % external ointment  Commonly known as: XYLOCAINE  Used for: History of shingles      Apply topically 2 times daily as needed for moderate pain  Quantity: 50 g  Refills: 3     Melatonin 10 MG Tabs tablet      Dose: 10 mg  Take 10 mg by mouth at bedtime  Refills: 0     methocarbamol 500 MG tablet  Commonly known as: ROBAXIN  Used for: DDD (degenerative disc disease), lumbar      TAKE 1 - 2 TABLETS BY MOUTH 4 TIMES DAILY AS NEEDED FOR MUSCLE SPASMS  Quantity: 240 tablet  Refills: 1     metoclopramide 5 MG tablet  Commonly known as: REGLAN      Dose: 5 mg  Take 5 mg by mouth 4 times daily as needed.  Refills: 0     * metoprolol succinate  MG 24 hr tablet  Commonly known as: TOPROL XL  Used for: Essential hypertension with goal blood pressure less than 140/90      TAKE 1 TABLET BY MOUTH IN THE EVENING; TO BE USED WITH 200MG IN MORNING  Quantity: 90 tablet  Refills: 2     * metoprolol succinate  MG 24 hr tablet  Commonly known as: TOPROL XL      Dose: 200 mg  Take 200 mg by mouth daily.  Refills: 0     montelukast 10 MG tablet  Commonly known as: SINGULAIR  Used for: Mild persistent asthma      TAKE ONE TABLET BY MOUTH EVERY EVENING  Quantity: 90 tablet  Refills: 0     nabumetone 500 MG tablet  Commonly known as: RELAFEN  Used for: Myofascial muscle pain, Chronic intractable pain      Dose: 500-1,000 mg  Take 1-2 tablets  "(500-1,000 mg) by mouth 2 times daily as needed for moderate pain  Quantity: 120 tablet  Refills: 1     naloxone 4 MG/0.1ML nasal spray  Commonly known as: NARCAN      Dose: 4 mg  Spray 4 mg into one nostril alternating nostrils as needed for opioid reversal every 2-3 minutes until assistance arrives. This medication is an antidote and stays on the chart as an \"as needed\" medication  Refills: 0     omega-3 acid ethyl esters 1 g capsule  Commonly known as: LOVAZA  Used for: Hypertriglyceridemia, Hyperlipidemia LDL goal <70      TAKE TWO CAPSULES BY MOUTH TWICE DAILY  Quantity: 360 capsule  Refills: 0     omeprazole 20 MG DR capsule  Commonly known as: PriLOSEC      Dose: 20 mg  Take 20 mg by mouth daily.  Refills: 0     pregabalin 150 MG capsule  Commonly known as: LYRICA      Dose: 150 mg  Take 150 mg by mouth 2 times daily.  Refills: 0     pyRIDostigmine 60 MG tablet  Commonly known as: MESTINON      Dose: 1 tablet  Take 1 tablet by mouth 3 times daily  Refills: 0     ramipril 10 MG capsule  Commonly known as: ALTACE  Used for: Essential hypertension with goal blood pressure less than 140/90      TAKE ONE CAPSULE BY MOUTH ONCE DAILY  Quantity: 90 capsule  Refills: 0     riboflavin 100 MG Caps      Dose: 200 mg  Take 200 mg by mouth daily Two hundred milligrams nightly to prevent migraine  Refills: 0     risperiDONE 0.5 MG tablet  Commonly known as: risperDAL      Dose: 0.5 mg  Take 0.5 mg by mouth 2 times daily as needed (aggitation).  Refills: 0     rosuvastatin 40 MG tablet  Commonly known as: CRESTOR  Used for: Hypertriglyceridemia      Dose: 40 mg  TAKE ONE TABLET BY MOUTH ONCE DAILY  Quantity: 90 tablet  Refills: 2     Semaglutide (1 MG/DOSE) 4 MG/3ML pen  Commonly known as: OZEMPIC  Used for: Hypogonadism male      Dose: 1 mg  Inject 1 mg Subcutaneous once a week  Quantity: 3 mL  Refills: 11     sodium chloride 0.9 % neb solution      Dose: 3 mL  Take 3 mLs by nebulization every 3 hours as needed  Refills: " 0     sodium fluoride 1.1 % Crea      At Bedtime  Refills: 0     triamcinolone 0.1 % external cream  Commonly known as: KENALOG      Apply topically 2 times daily as needed for irritation. Apply for up to 2 weeks per month on hands  Refills: 0     Ubrelvy 100 MG tablet  Generic drug: ubrogepant      Dose: 100 mg  Take 100 mg by mouth at onset of headache  Refills: 0     valACYclovir 500 MG tablet  Commonly known as: VALTREX  Used for: Herpes zoster without complication      Dose: 500 mg  Take 1 tablet (500 mg) by mouth daily  Quantity: 90 tablet  Refills: 3     vitamin B complex with vitamin C tablet      Dose: 1 tablet  Take 1 tablet by mouth daily  Refills: 0     ZINC SULFATE-VITAMIN C MT      Dose: 1 tablet  Take 1 tablet by mouth daily  Refills: 0           * This list has 4 medication(s) that are the same as other medications prescribed for you. Read the directions carefully, and ask your doctor or other care provider to review them with you.                   Where to get your medicines        These medications were sent to [facility] [address] [place] [facility] [address] [facility]  [address] [facility] [address]      Phone: [phone]   oxyCODONE 5 MG tablet  senna-docusate 8.6-50 MG tablet          Discharge Procedure Orders   Reason for your hospital stay   Order Comments: Bilateral lumbar 4-lumbar 5 hemilaminectomies, medial facetectomies and foraminotomies with lumbar microdiscectomy on the left and lumbar 5-sacral 1 foraminotomies.     Follow-up and recommended labs and tests    Order Comments: Your Neurosurgical follow-up appointments have been scheduled. You may call [phone] to make, confirm or change your appointment dates and/or times.     Activity   Order Comments: Please limit your lifting to no more that ten pounds and avoid excessive bending, twisting and turning at the lumbar spine. You should also avoid excessive jostling and jarring activities.     Order Specific  Question Answer Comments   Is discharge order? Yes      Wound care and dressings   Order Comments: Instructions to care for your wound at home: ice to area for comfort, keep wound clean and dry, and may get incision wet in shower but do not soak or scrub.    Please avoid lotions and creams. Keep incision open to air.     Diet   Order Comments: Follow this diet upon discharge: Advance to a regular diet as tolerated     Order Specific Question Answer Comments   Is discharge order? Yes           Respectfully,   Mir Amezcua MPAS, PA-C

## 2024-09-27 NOTE — PROGRESS NOTES
[facility]    Medicine Progress Note - Hospitalist Service    Date of Admission:  9/25/2024    Assessment & Plan   Joel Pineda is a 49 year old male with PMHx of lumbar radiculopathy, DDD, chronic pain, type 2 diabetes, essential hypertension, hypothyroidism, BPD/YUDI/MDD, insomnia, POTS, IBS, GERD, LLOYD who was admitted on 9/25/2024 s/p bilateral lumbar 4-lumbar 5 hemilaminectomies, medial facetectomies and foraminotomies with lumbar microdiscectomy on the left and lumbar 5-sacral 1 foraminotomies      Lumbar radiculopathy  POD#2 lumbar decompression  -- DVT prophylaxis per surgery  -- Patient does see pain clinic chronically; consider inpatient pain management consult for discharge medication recommendations  -- Continue PTA methocarbamol, Lyrica; defer further pain control to surgery  -- PT/OT  -- Was straight cathed in PACU x 1 with a 850 mL output; monitor I's and O's closely, bladder scan as needed, seems normal 9/26  -- Add second senna dose 9/26  -- Medically fit for discharge if okay with surgery and therapies 9/27     Type 2 diabetes mellitus  Diabetic polyneuropathy  A1  5.5%; 6/2024   -- Holding PTA Ozempic  -- Low sliding scale  -- Glucose checks; hypoglycemia protocol     Leukocytosis   -- Suspect non infectious, likely surgical stress, monitor    Essential hypertension  -- Continue PTA metoprolol and lisinopril (substituted for ramipril) with hold parameters  -- Close monitoring  -- Ensure good pain control     BPD, YUDI, MDD  -- continue PTA bupropion, clonazepam, Depakote, duloxetine, Mucinex  -- Hold PTA PRN Risperdal in postoperative state     Insomnia - holding PTA Lunesta in the postoperative state      Hypothyroidism - continue PTA levothyroxine  Intermittent asthma - continue PTA inhalers  POTS - cont PTA pyridostigmine   Migraine - cont PTA riboflavin    IBS - on bowel regimen per neurosurgery; hold PTA Colace for now  GERD -- Continue PTA famotidine,  "omeprazole  Hyperlipidemia - continue PTA statin, fenofibrate  Allergies - continue PTA Allegra, Flonase, montelukast  LLOYD - home CPAP          Diet: Moderate Consistent Carb (60 g CHO per Meal) Diet  Diet    DVT Prophylaxis: Defer to primary service  Angela Catheter: Not present  Lines: None     Cardiac Monitoring: None  Code Status: Full Code      Clinically Significant Risk Factors Present on Admission                  # Hypertension: Noted on problem list         # Obesity: Estimated body mass index is 34.45 kg/m  as calculated from the following:    Height as of 9/16/24: 1.93 m (6' 4\").    Weight as of this encounter: 128.4 kg (283 lb).       # Asthma: noted on problem list        Disposition Plan     Medically Ready for Discharge: Anticipated Today             Neil Gong MD  Hospitalist Service  [facility]  Securely message with [email or web address] (more info)  Text page via [employer] Paging/Directory   ______________________________________________________________________    Interval History   Feels okay, pain overall controlled, heightened at times.  No nausea or vomiting, no shortness of breath.    Physical Exam   Vital Signs: Temp: 98.5  F (36.9  C) Temp src: Oral BP: 107/56 Pulse: 94   Resp: 20 SpO2: 98 % O2 Device: None (Room air)    Weight: 283 lbs 0 oz    Well-appearing, no acute distress, heart and lungs normal, active bowel sounds, no leg edema    Medical Decision Making       45 MINUTES SPENT BY ME on the date of service doing chart review, history, exam, documentation & further activities per the note.  NOTE(S)/MEDICAL RECORDS REVIEWED over the past 24 hours: Vitals, labs, new imaging, documentation and medication administrations       Data         Imaging results reviewed over the past 24 hrs:   No results found for this or any previous visit (from the past 24 hour(s)).  "

## 2024-09-27 NOTE — PLAN OF CARE
"  Problem: Adult Inpatient Plan of Care  Goal: Plan of Care Review  Description: The Plan of Care Review/Shift note should be completed every shift.  The Outcome Evaluation is a brief statement about your assessment that the patient is improving, declining, or no change.  This information will be displayed automatically on your shift  note.  Outcome: Progressing  Goal: Patient-Specific Goal (Individualized)  Description: You can add care plan individualizations to a care plan. Examples of Individualization might be:  \"Parent requests to be called daily at 9am for status\", \"I have a hard time hearing out of my right ear\", or \"Do not touch me to wake me up as it startles  me\".  Outcome: Progressing  Goal: Absence of Hospital-Acquired Illness or Injury  Outcome: Progressing  Intervention: Identify and Manage Fall Risk  Recent Flowsheet Documentation  Taken 9/26/2024 1630 by Rimma Washburn RN  Safety Promotion/Fall Prevention: activity supervised  Intervention: Prevent Skin Injury  Recent Flowsheet Documentation  Taken 9/26/2024 1630 by Rimma Washburn RN  Body Position: position changed independently  Goal: Optimal Comfort and Wellbeing  Outcome: Progressing  Goal: Readiness for Transition of Care  Outcome: Progressing     Problem: Pain Acute  Goal: Optimal Pain Control and Function  Outcome: Progressing  Intervention: Prevent or Manage Pain  Recent Flowsheet Documentation  Taken 9/26/2024 1630 by Rimma Washburn RN  Medication Review/Management: medications reviewed     Problem: Fall Injury Risk  Goal: Absence of Fall and Fall-Related Injury  Outcome: Progressing  Intervention: Identify and Manage Contributors  Recent Flowsheet Documentation  Taken 9/26/2024 1630 by Rimma Washburn RN  Medication Review/Management: medications reviewed  Intervention: Promote Injury-Free Environment  Recent Flowsheet Documentation  Taken 9/26/2024 1630 by Rimma Washburn RN  Safety Promotion/Fall Prevention: activity supervised     Problem: " Surgery Nonspecified  Goal: Absence of Bleeding  Outcome: Progressing  Goal: Effective Bowel Elimination  Outcome: Progressing  Goal: Fluid and Electrolyte Balance  Outcome: Progressing  Goal: Blood Glucose Level Within Targeted Range  Outcome: Progressing  Goal: Absence of Infection Signs and Symptoms  Outcome: Progressing  Goal: Optimal Pain Control and Function  Outcome: Progressing     Problem: Comorbidity Management  Goal: Maintenance of Asthma Control  Outcome: Progressing  Intervention: Maintain Asthma Symptom Control  Recent Flowsheet Documentation  Taken 9/26/2024 1630 by Rimma Washburn RN  Medication Review/Management: medications reviewed  Goal: Blood Glucose Levels Within Targeted Range  Outcome: Progressing  Intervention: Monitor and Manage Glycemia  Recent Flowsheet Documentation  Taken 9/26/2024 1630 by Rimma Washburn RN  Medication Review/Management: medications reviewed  Goal: Blood Pressure in Desired Range  Outcome: Progressing  Intervention: Maintain Blood Pressure Management  Recent Flowsheet Documentation  Taken 9/26/2024 1630 by Rimma Washburn RN  Medication Review/Management: medications reviewed   Goal Outcome Evaluation:       Patient medicated with Oxycodone. On schedule Tylenol and Robaxin. Rating pain from 5 to 3. Drained discontinued tonight. Had BM. Eating well. Walked in hallway.

## 2024-09-27 NOTE — OP NOTE
NEUROSURGERY OPERATIVE REPORT     DATE OF SERVICE: 9/27/2024    PREOPERATIVE DIAGNOSES:  Lumbar disc herniation  Lumbar radiculopathy    Lumbar stenosis    POSTOPERATIVE DIAGNOSES:  Same     PROCEDURES:  1.   Bilateral lumbar 4-lumbar 5 hemilaminectomies, medial facetectomies and foraminotomies with left lumbar microdiscectomy on the left and lumbar 5-sacral 1 foraminotomies,   2.  Use of operating room microscope.    SURGEON:  Anitha Biggs MD    ASSISTANT: Luz Araujo PA-C     INDICATIONS:  Joel Pineda is a 48 yo male who is s/p lumbar 5-sacral 1 microdiscectomy in 2007 who presents with bilateral leg pain and back pain. MRI of his lumbar spine was reviewed with the patient. He has stenosis at lumbar 4-5 due to a central disc protrusion.  He has moderate to severe left foraminal narrowing and moderate right foraminal narrowing at this level and overall moderate central stenosis.  At lumbar 5-sacral 1 he has moderate to severe bilateral foraminal narrowing.  X-ray shows no significant spondylolisthesis or instabilities.  Symptoms appear most likely related to L5 nerve impingement in the lateral recess at lumbar 4-5 and foramina at lumbar 5-sacral 1.  Recommend bilateral lumbar 4-lumbar 5 hemilaminectomies, medial facetectomies and foraminotomies with possible lumbar  microdiscectomy and lumbar 5-sacral 1 foraminotomies . Risks and benefits of surgery discussed including but not limited to infection, hematoma, nerve damage including paralysis, post op radiculitis, durotomy, inadequate symptom relief, recurrent lumbar disease, risks associated with the use of general anesthesia.     PROCEDURE:  After obtaining informed consent, the patient was brought to the operating room with pneumatic stockings in place.  IV antibiotics was administered.  He was intubated under general endotracheal anesthesia.  He was turned into the radiolucent laminectomy frame with all pressure points well padded.   He was prepped  and draped in the usual sterile fashion.  A pre incisional infiltration of local anesthetic was performed along the midline and the incision was opened sharply and extended down to the fascia.  The fascia was opened in one layer with monopolar electrocautery.  A subperiosteal dissection was undertaken to expose the lamina at lumbar 4-5.   We verified levels with a lateral x-ray.      Once levels were verified, we then proceeded to remove the inferior portion of the hemilamina of bilateral lumbar 4 and the superior portion of the lamina of lumbar 5  with a Midas drill.   We drilled down to the ligamentum elevated the ligamentum from the underlying thecal sac and removed it.  We brought in the operating room microscope for this and all microdissection.  We next drilled medial facetectomies and foraminotomies opening up the lateral recess and expose the underlying impinged nerve root.  We evaluated both annulus. The left side appeared to have more of a bulge.  On the left we identified the nerve and cut the annulus as needed to evacuate the disc bulge to release impingement on the nerve.  We used a combination of pituitaries and curettes to remove sufficient disc and then used blunt hooks under the thecal sac and in the foramen to verify that there was no further disc material to milk out into the dissection site.  Once the nerve was completely decompressed we used ball tip probes to verify no further pressure either in the canal or in the foramina. We then continued our decompression inferiorly on both sides following the L5 nerves into their foramen. We used Kerrison rongeurs and the high speed air drill to further drill foraminotomies. Once completed the lumbar 4 and lumbar 5 nerves appeared decompressed. We then proceeded to copiously irrigate the incision with antibiotic-containing saline and achieved hemostasis. A drain was placed for hematoma prevention.     An assistant was needed for positioning, retraction  and closure.      The incision was closed in layers with interrupted 0 Vicryl to approximate the muscle and fascia, inverted 2-0 PDS to approximate the subcutaneous tissues and Monocryl to approximate the skin edges.  Dermabond was then placed. Sponge and needle counts were correct prior to closure x 2.  Sterile dressings were placed.      Patient tolerated the procedure well, was taken to the recovery room where he was extubated and found to be at his neurological baseline with no new deficits appreciated.    ESTIMATED BLOOD LOSS:75 cc    FINDINGS: none    DRAIN: none    IMPLANTS: none      Anitha Biggs MD    Cc: Ksenia Lyles

## 2024-09-27 NOTE — PROGRESS NOTES
Neurosurgery Progress     Dx:     POD #2 s/p bilateral lumbar 4-lumbar 5 hemilaminectomies, medial facetectomies and foraminotomies with left lumbar microdiscectomy and lumbar 5-sacral 1 foraminotomies.    Neuro stable.     TODAY'S PLAN:     -May discharge from a Neurosurgical perspective.   -OT has cleared for home discharge.   -Discharge navigator has been updated.   -Follow-up scheduled for 10/10 at 1:00.  -Advance activity as tolerated.  -Continue supportive and symptomatic treatment.  -Continue physical therapy.  -Pain control measures.  -Advance diet as tolerated.  -Routine wound care.    In the event that patient's symptoms worsen or change we would appreciate being contacted. We did discuss signs of a worsening problem that he should seek being evaluated.     We did review the above information with the patient whom agrees with the plan and did verbalize understanding.   ________________________________________________________________     Mr. Pineda overall feels well and denies any significant discomfort. Father present during evaluation. Both feel that he is ready and able for discharge. Tolerating regular diet without n/v. Up ambulating independently. Voiding without difficulty.     /56 (BP Location: Left arm)   Pulse 94   Temp 98.5  F (36.9  C) (Oral)   Resp 20   Wt 128.4 kg (283 lb)   SpO2 98%   BMI 34.45 kg/m       Patient examined in room 432 with his father and therapy present. He appears comfortable and in no apparent distress. He is moving all of his extremities well.   CN II-XII intact, alert and appropriate with conversation. Follows all commands.   Bilateral upper and lower extremities with appropriate strength. Deep tendon reflexes within normal limits throughout. Sensation is also intact throughout with the exception of his left great toe. He does have an acceptable post-operative range of motion.   His incision is absent for concerning edema, erythema or drainage.   Bandage is  clean, dry and intact.   Calves soft and non-tender.       All pertinent labs and updated imaging reviewed in EPIC.     Mir Amezcua PA-C   Neurosurgery

## 2024-09-27 NOTE — PLAN OF CARE
Goal Outcome Evaluation:         Problem: Pain Acute  Goal: Optimal Pain Control and Function  Outcome: Progressing         Problem: Surgery Nonspecified  Goal: Effective Oxygenation and Ventilation  Intervention: Optimize Oxygenation and Ventilation  Recent Flowsheet Documentation  Taken 9/27/2024 0118 by Char Borden RN  Head of Bed (HOB) Positioning: HOB at 20-30 degrees        Problem: Pain Acute  Goal: Optimal Pain Control and Function  Outcome: Progressing     Problem: Surgery Nonspecified  Goal: Absence of Bleeding  Outcome: Progressing   Pt is A/O, has been ambulating to the bathroom , voiding finem, surg dressing is clean and dry, pain in the back controlled with dilaudid 1 time and oxycodone 1 time, VSS

## 2024-09-27 NOTE — PROGRESS NOTES
Care Management Discharge Note    Discharge Date: 09/27/2024       Discharge Disposition:  home    Discharge Services:  none    Discharge DME:  none    Discharge Transportation: family or friend will provide    Private pay costs discussed: Not applicable    Does the patient's insurance plan have a 3 day qualifying hospital stay waiver?  No    PAS Confirmation Code:    Patient/family educated on Medicare website which has current facility and service quality ratings:      Education Provided on the Discharge Plan:    Persons Notified of Discharge Plans: pt  Patient/Family in Agreement with the Plan:      Handoff Referral Completed: No, handoff not indicated or clinically appropriate    Additional Information:  Pt will discharge home w/family, father here to transport. No further CM needs. Walking in hallway with walker and states he tolerating pain.    Sadia Pineda RN

## 2024-09-27 NOTE — PLAN OF CARE
Occupational Therapy Discharge Summary    Reason for therapy discharge:    Discharged to home.    Progress towards therapy goal(s). See goals on Care Plan in [facility] electronic health record for goal details.  Goals met    Therapy recommendation(s):    No further therapy is recommended.

## 2024-09-28 ENCOUNTER — NURSE TRIAGE (OUTPATIENT)
Dept: NURSING | Facility: CLINIC | Age: 49
End: 2024-09-28
Payer: MEDICARE

## 2024-09-28 NOTE — TELEPHONE ENCOUNTER
Nurse Triage SBAR    Is this a 2nd Level Triage? YES, LICENSED PRACTITIONER REVIEW IS REQUIRED    Situation: Post Op Fever    Background: Pt states he was discharged yesterday after lumbar surgery on Wednesday. States that pain is mostly controlled, however he has developed a fever last night and through today. States the fever is around 99.5, and has been taking max dosed tylenol and fever is still persisting.     Assessment: Denies headache, vomiting, inability to eat or drink, any fever over 100.4, or severe pain. Unable to inspect surgical area due to dressing.     Protocol Recommended Disposition:   Call PCP Now    Recommendation: Will page surgical team for recommendation due to persistent fever over max tylenol dosage.    Paged to provider Mir Amezcua  Recommended monitoring fever, if worsens (over 100.4) then pt should be seen, or if low grade fever persists for over 3 days Pt should callback and/or follow up with PCP     Provider Recommendation Follow Up:   Reached patient/caregiver. Informed of provider's recommendations. Patient verbalized understanding and agrees with the plan.         Does the patient meet one of the following criteria for ADS visit consideration? 16+ years old, with an [facility] PCP     TIP  Providers, please consider if this condition is appropriate for management at one of our Acute and Diagnostic Services sites.     If patient is a good candidate, please use dotphrase <dot>triageresponse and select Refer to ADS to document.    Reason for Disposition   [1] Caller has URGENT question AND [2] triager unable to answer question    Additional Information   Negative: Sounds like a life-threatening emergency to the triager   Negative: [1] Widespread rash AND [2] bright red, sunburn-like   Negative: [1] SEVERE headache AND [2] after spinal (epidural) anesthesia   Negative: [1] Vomiting AND [2] persists > 4 hours   Negative: [1] Vomiting AND [2] abdomen looks much more swollen than usual    Negative: [1] Drinking very little AND [2] dehydration suspected (e.g., no urine > 12 hours, very dry mouth, very lightheaded)   Negative: Patient sounds very sick or weak to the triager   Negative: Sounds like a serious complication to the triager   Negative: [1] SEVERE post-op pain (e.g., excruciating, pain scale 8-10) AND [2] not controlled with pain medications   Negative: [1] Headache AND [2] after spinal (epidural) anesthesia AND [3] not severe   Negative: [1] MILD-MODERATE post-op pain (e.g., pain scale 1-7) AND [2] not controlled with pain medications   Negative: [1] Caller has NON-URGENT question AND [2] triager unable to answer question   Negative: Fever present > 3 days (72 hours)   Negative: Fever > 100.4 F (38.0 C)    Protocols used: Post-Op Symptoms and [id number]

## 2024-09-30 DIAGNOSIS — M51.369 DDD (DEGENERATIVE DISC DISEASE), LUMBAR: ICD-10-CM

## 2024-09-30 DIAGNOSIS — Z09 HOSPITAL DISCHARGE FOLLOW-UP: ICD-10-CM

## 2024-09-30 RX ORDER — METHOCARBAMOL 500 MG/1
TABLET, FILM COATED ORAL
Qty: 240 TABLET | Refills: 0 | Status: SHIPPED | OUTPATIENT
Start: 2024-09-30

## 2024-10-03 ENCOUNTER — VIRTUAL VISIT (OUTPATIENT)
Dept: PALLIATIVE MEDICINE | Facility: CLINIC | Age: 49
End: 2024-10-03
Payer: MEDICARE

## 2024-10-03 DIAGNOSIS — M47.16 LUMBAR SPONDYLOSIS WITH MYELOPATHY: ICD-10-CM

## 2024-10-03 DIAGNOSIS — G89.29 CHRONIC INTRACTABLE PAIN: Primary | ICD-10-CM

## 2024-10-03 PROCEDURE — 96159 HLTH BHV IVNTJ INDIV EA ADDL: CPT | Mod: 95 | Performed by: PSYCHOLOGIST

## 2024-10-03 PROCEDURE — 96158 HLTH BHV IVNTJ INDIV 1ST 30: CPT | Mod: 95 | Performed by: PSYCHOLOGIST

## 2024-10-03 NOTE — PROGRESS NOTES
Joel Pineda is a 49 year old male who is being evaluated via a billable video visit.      Patient is currently in the [facility]? yes    Patient would like the video invitation sent by: Text to cell phone: [phone]}    Video Start Time: 2:00 PM  Video Stop Time: 2:53 PM    Additional provider notes:     Pain Diagnoses per pain provider:       Chronic intractable pain    Lumbar spondylosis with myelopathy    DATA: During today's visit you reported the following: Your chronic pain is overshadowed by acute pain, 'chronic pain isn't even registering'. Your mood is generally fine. Your activity level is reduced due to recent surgery. Your stress level is manageable. Your need for sleep is increased - sleeping about 12 hours daily. You reported engaging in self-care for your pain 2-3 times daily.    You identified that you would like to focus on the following or had questions regarding the following issues or concerns, and we discussed the following:   - overall surgery went well last week  - father and step-mother stayed through Sunday morning to help you recover  - using ice and TENS unit  - new rental neighbors - offered you food after they saw you using walker, but haven't seen them since and worry you might have upset them, discussed avoiding catastrophic thinking and just continuing to treat them with common courtesy  - discussed continuing to keep an eye on mental health, remembering you have additional supports that were not in place after 2007 surgery  - reframed worry thoughts  - allowing  of [employer] board to manage tasks you cannot while incapacitated post-surgically    ASSESSMENT: Onurs pain is currently overshadowed by acute surgical pain. He seems to be utilizing strategies to manage his pain both in terms of medication use and other coping strategies. We discussed again reminding himself he has improved support than he did after first surgery in 2007, and this has seemed to help his  anticipatory anxiety overall.     PLAN:   Your next appointment is scheduled for 10/21 at 3:00 PM. This is for mental health intake.   Assignment/Objectives /interventions for next session:   - complete intake paperwork  - continue to stay on top of your acute post-surgical pain  - engage in physical activity as tolerated and per recommendations from surgeon    We believe regular attendance is key to your success in our program!    Any time you are unable to keep your appointment we ask that you call us at [phone] at least 24 hours in advance to cancel.This will allow us to offer the appointment time to another patient.   Multiple missed appointments may lead to dismissal from the clinic.    Telemedicine Visit: The patient's condition can be safely assessed and treated via synchronous audio and visual telemedicine encounter.      Reason for Telemedicine Visit: Services only offered telehealth    Originating Site (Patient Location): Patient's home    Distant Site (Provider Location): Provider Remote Setting- Home Office    Consent:  The patient/guardian has verbally consented to: the potential risks and benefits of telemedicine (video visit) versus in person care; bill my insurance or make self-payment for services provided; and responsibility for payment of non-covered services.     Mode of Communication:  Video Conference via [employer]    As the provider I attest to compliance with applicable laws and regulations related to telemedicine.      Shirley Bartlett PsyD LP  Licensed Psychologist  Outpatient Clinic Therapist  [facility]

## 2024-10-05 NOTE — TELEPHONE ENCOUNTER
Routing refill request to provider for review/approval because:  Last comment states making plan with psychiatrist, appropriate for refill?     Adriana Read RN, BSN, CMSRN  [facility]           Home/No skilled PT needs

## 2024-10-10 ENCOUNTER — ALLIED HEALTH/NURSE VISIT (OUTPATIENT)
Dept: NEUROSURGERY | Facility: CLINIC | Age: 49
End: 2024-10-10
Payer: MEDICARE

## 2024-10-10 VITALS — SYSTOLIC BLOOD PRESSURE: 135 MMHG | HEART RATE: 76 BPM | DIASTOLIC BLOOD PRESSURE: 80 MMHG | RESPIRATION RATE: 18 BRPM

## 2024-10-10 DIAGNOSIS — M54.16 LUMBAR RADICULOPATHY: Primary | ICD-10-CM

## 2024-10-10 PROCEDURE — 99207 PR NO CHARGE NURSE ONLY: CPT

## 2024-10-10 NOTE — CONFIDENTIAL NOTE
Tian Pineda is status post  bilateral lumbar 4-lumbar 5 hemilaminectomies, medial facetectomies and foraminotomies with left lumbar microdiscectomy on the left and lumbar 5-sacral 1 foraminotomies on 09/25/2024 with Dr. Biggs.    Preoperatively presented with bilateral leg pain and back pain.  Today he presents in follow up for wound check. He is pleased with his outcome - reports a resolved pain in his legs. Endorses residual numbness in his feet. No tingling. Notes intermittent back pain associated with muscle stiffness. Gait and balance are normal. Takes oxycodone and Robaxin as prescribed. In a back brace.      Surgical wound WNL - CDI, no signs of infection or skin breakdown.  Incision well-healed: good skin approximation, no redness or visible/palpable edema, no tenderness to palpation.  PT. AF, denies fever, chills or sweats.  Pt. reports that the symptoms are improved from pre-op.    Lakisha Otoole RN, CNRN

## 2024-10-14 ENCOUNTER — TRANSFERRED RECORDS (OUTPATIENT)
Dept: HEALTH INFORMATION MANAGEMENT | Facility: CLINIC | Age: 49
End: 2024-10-14
Payer: MEDICARE

## 2024-10-14 DIAGNOSIS — M79.18 MYOFASCIAL MUSCLE PAIN: ICD-10-CM

## 2024-10-14 DIAGNOSIS — G89.29 CHRONIC INTRACTABLE PAIN: ICD-10-CM

## 2024-10-15 ENCOUNTER — MYC MEDICAL ADVICE (OUTPATIENT)
Dept: PULMONOLOGY | Facility: CLINIC | Age: 49
End: 2024-10-15

## 2024-10-15 DIAGNOSIS — J45.30 MILD PERSISTENT ASTHMA, UNSPECIFIED WHETHER COMPLICATED: Primary | ICD-10-CM

## 2024-10-15 RX ORDER — NABUMETONE 500 MG/1
TABLET, FILM COATED ORAL
Qty: 120 TABLET | Refills: 0 | Status: SHIPPED | OUTPATIENT
Start: 2024-10-15

## 2024-10-17 ENCOUNTER — DOCUMENTATION ONLY (OUTPATIENT)
Dept: MEDSURG UNIT | Facility: HOSPITAL | Age: 49
End: 2024-10-17
Payer: MEDICARE

## 2024-10-17 DIAGNOSIS — R26.89 IMPAIRED GAIT AND MOBILITY: Primary | ICD-10-CM

## 2024-10-17 NOTE — PROGRESS NOTES
"    [facility]         PATIENT'S NAME: Joel Pineda  PREFERRED NAME: Tito  PRONOUNS:       MRN: [id number]  : 1975  ADDRESS: [facility] [address] [place] [address]  ACCT. NUMBER:  [id number]  DATE OF SERVICE: 10/21/24  START TIME: 3:00 PM   END TIME: 3:57 PM   PREFERRED PHONE: [phone]  May we leave a program related message: Yes  EMERGENCY CONTACT: was obtained - mother Geovanna [phone] .  SERVICE MODALITY:  Video Visit:      Provider verified identity through the following two step process.  Patient provided:  Patient photo, Patient , and Patient is known previously to provider    Telemedicine Visit: The patient's condition can be safely assessed and treated via synchronous audio and visual telemedicine encounter.      Reason for Telemedicine Visit: Patient has requested telehealth visit    Originating Site (Patient Location): Patient's home    Distant Site (Provider Location): Provider Remote Setting- Home Office    Consent:  The patient/guardian has verbally consented to: the potential risks and benefits of telemedicine (video visit) versus in person care; bill my insurance or make self-payment for services provided; and responsibility for payment of non-covered services.     Patient would like the video invitation sent by:  Text to cell phone: [phone]    Mode of Communication:  Video Conference via Amwell    Distant Location (Provider):  Off-site    As the provider I attest to compliance with applicable laws and regulations related to telemedicine.    UNIVERSAL ADULT Mental Health DIAGNOSTIC ASSESSMENT    Identifying Information:  Patient is a 49 year old,    individual.  Patient was referred for an assessment by self .  Patient attended the session alone.    Chief Complaint:   The reason for seeking services at this time is: \" SPMI \"   The problem(s) began 2008. Patient has attempted to resolve these concerns in the past through individual therapy, " DBT, IOP .    Social/Family History:  [place], [place] and raised in  [facility] between [place] and [place], [place].  Patient has moved during childhood.  They were raised by biological parents, step mother, and step father.  Parents  47 years ago when the patient was 2 years old. The patient mother did remarry 45 years ago The patient's father did remarry 45 years ago. Father has been remarried a few times.   Patient reported that their childhood was 'kind of lived in a shell'.  Patient described their current relationships with family of origin as good.      The patient describes their cultural background as .  Cultural influences and impact on patient's life structure, values, norms, and healthcare:  Raised in an extremely conservative [employer]. .  Contextual influences on patient's health include: Contextual Factors: Individual Factors mental health and Health- Seeking Factors chronic pain .  Cultural, Contextual, and socioeconomic factors do not affect the patient's access to services.  These factors will be addressed in the Preliminary Treatment plan.  Patient identified their preferred language to be English. Patient reported they do not  need the assistance of an  or other support involved in therapy.     Patient reported had no significant delays in developmental tasks.   Patient's highest education level was college graduate. Patient identified the following learning problems: none reported.  Modifications will not be used to assist communication in therapy.   Patient reports they are  able to understand written materials.    Patient reported the following relationship history a couple 'flings' - nothing lasted longer than a few months.  Patient's current relationship status is single for 19 years.   Patient identified their sexual orientation as heterosexual.  Patient reported having zero child(reagan). Patient identified  parents, siblings, pets, and friends as part of their support system.  Patient identified the quality of these relationships as good.     Patient's current living/housing situation involves staying in own home/apartment.  They live alone and they report that housing is stable.     Patient is currently disabled.  Patient reports their finances are obtained through SSDI disability and [employer] .  Patient does identify finances as a current stressor.  Volunteers for his [employer]. 15 of the last 18 years.     Patient reported that they have not been involved with the legal system.   Patient denies being on probation / parole / under the jurisdiction of the court.    Patient's Strengths and Limitations:  Patient identified the following strengths or resources that will help them succeed in treatment: community involvement, family support, sense of humor, and work ethic. Things that may interfere with the patient's success in treatment include: financial hardship and physical health concerns.     Assessments:  The following assessments were completed by patient for this visit:  PHQ9:       3/15/2023    11:59 AM 5/9/2023     9:09 AM 8/3/2023     7:16 PM 9/12/2023    10:10 AM 10/25/2023     6:30 PM 2/29/2024     9:50 AM 9/15/2024     8:14 AM   PHQ-9 SCORE   PHQ-9 Total Score MyChart 12 (Moderate depression) 4 (Minimal depression) 10 (Moderate depression) 12 (Moderate depression) 11 (Moderate depression) 16 (Moderately severe depression) 10 (Moderate depression)   PHQ-9 Total Score 12 4 10 12 11 16 10     GAD7:       12/26/2023    10:19 AM 3/19/2024     9:11 AM 4/6/2024    10:04 AM 4/22/2024    10:44 AM 7/18/2024     9:53 AM 8/1/2024     9:28 AM 10/20/2024     2:25 PM   YUDI-7 SCORE   Total Score 7 (mild anxiety) 10 (moderate anxiety) 9 (mild anxiety) 8 (mild anxiety) 11 (moderate anxiety) 7 (mild anxiety) 7 (mild anxiety)   Total Score 7 10 9 8 11 7 7     CAGE-AID:       11/23/2020    12:00 PM 5/7/2021     1:00 PM  "7/14/2021     9:14 PM 7/19/2021    10:54 AM 10/20/2024     2:20 PM   CAGE-AID Total Score   Total Score  0   2   Total Score MyChart     2 (A total score of 2 or greater is considered clinically significant)       Information is confidential and restricted. Go to Review Flowsheets to unlock data.   CAGE-AID    Have you felt you ought to cut down on your drinking or drug use? (P) No    Have people annoyed you by criticizing your drinking or drug use? (P) No    Have you felt bad or guilty about your drinking or drug use? (P) No    Have you ever had a drink or used drugs first thing in the morning to steady your nerves or to get rid of a hangover (eye opener)? (P) No    CAGE-AID SCORE - (P) 0    CAGE-AID reprinted with permission from the [place] Medical Journal, DAVE Lorenzo. and CHRISTY Spears, \"Conjoint screening questionnaires for alcohol and drug abuse\" [place] Medical Journal 94: 135-140, 1995.   PROMIS 10-Global Health (all questions and answers displayed):       3/19/2024     9:13 AM 4/6/2024    10:08 AM 4/22/2024    10:45 AM 7/18/2024     9:55 AM 8/1/2024     9:29 AM 8/17/2024     1:12 PM 10/20/2024     2:19 PM   PROMIS 10   In general, would you say your health is: Fair Fair Fair Fair Fair Fair Fair   In general, would you say your quality of life is: Fair Fair Fair Fair Fair Fair Fair   In general, how would you rate your physical health? Fair Fair Fair Fair Fair Fair Fair   In general, how would you rate your mental health, including your mood and your ability to think? Fair Fair Good Good Fair Fair Fair   In general, how would you rate your satisfaction with your social activities and relationships? Fair Fair Fair Poor Poor Poor Poor   In general, please rate how well you carry out your usual social activities and roles Fair Fair Fair Poor Poor Fair Poor   To what extent are you able to carry out your everyday physical activities such as walking, climbing stairs, carrying groceries, or moving a chair? Mostly " A little A little Moderately Moderately Moderately A little   In the past 7 days, how often have you been bothered by emotional problems such as feeling anxious, depressed, or irritable? Often Often Sometimes Often Often Often Sometimes   In the past 7 days, how would you rate your fatigue on average? Moderate Severe Severe Moderate Mild Mild Mild   In the past 7 days, how would you rate your pain on average, where 0 means no pain, and 10 means worst imaginable pain? 5 6 4 5 5 4 5   In general, would you say your health is: 2 2 2 2 2 2 2   In general, would you say your quality of life is: 2 2 2 2 2 2 2   In general, how would you rate your physical health? 2 2 2 2 2 2 2   In general, how would you rate your mental health, including your mood and your ability to think? 2 2 3 3 2 2 2   In general, how would you rate your satisfaction with your social activities and relationships? 2 2 2 1 1 1 1   In general, please rate how well you carry out your usual social activities and roles. (This includes activities at home, at work and in your community, and responsibilities as a parent, child, spouse, employee, friend, etc.) 2 2 2 1 1 2 1   To what extent are you able to carry out your everyday physical activities such as walking, climbing stairs, carrying groceries, or moving a chair? 4 2 2 3 3 3 2   In the past 7 days, how often have you been bothered by emotional problems such as feeling anxious, depressed, or irritable? 4 4 3 4 4 4 3   In the past 7 days, how would you rate your fatigue on average? 3 4 4 3 2 2 2   In the past 7 days, how would you rate your pain on average, where 0 means no pain, and 10 means worst imaginable pain? 5 6 4 5 5 4 5   Global Mental Health Score 8 8 10 8 7 7    7 8   Global Physical Health Score 12 9 9 11 12 12    12 11   PROMIS TOTAL - SUBSCORES 20 17 19 19 19 19    19 19     [place] Suicide Severity Rating Scale (Lifetime/Recent)      8/8/2019     5:21 PM 11/23/2020    12:00 PM 12/8/2020      9:00 AM 12/11/2020     8:00 AM 5/7/2021     1:00 PM 7/19/2021    11:30 AM 9/25/2024     9:55 AM   [place] Suicide Severity Rating (Lifetime/Recent)   Q1 Wish to be Dead (Lifetime)  Yes Yes Yes Yes     Comments   yes yes      Q2 Non-Specific Active Suicidal Thoughts (Lifetime)  No   No     Q1 Wished to be Dead (Past Month) no     yes 0-->no   Q2 Suicidal Thoughts (Past Month) no     no 0-->no   Q3 Suicidal Thought Method      no    Q4 Suicidal Intent without Specific Plan      no    Q5 Suicide Intent with Specific Plan      no    Q6 Suicide Behavior (Lifetime)      no 0-->no   Level of Risk per Screen      low risk no risks indicated   RETIRED: 1. Wish to be Dead (Recent)  Yes No No Yes     Comments     SI no plan or intent     RETIRED: 2. Non-Specific Active Suicidal Thoughts (Recent)  No   No     3. Active Suicidal Ideation with any Methods (Not Plan) Without Intent to Act (Lifetime)  No   No     RETIRED: 3. Active Suicidal Ideation with any Methods (Not Plan) Without Intent to Act (Recent)  No   No     RETIRED: Active Suicidal Ideation with any Methods (Not Plan) Description (Recent)     n     RETIRE: 4. Active Suicidal Ideation with Some Intent to Act, Without Specific Plan (Lifetime)  No   No     4. Active Suicidal Ideation with Some Intent to Act, Without Specific Plan (Recent)  No   No     Active Suicidal Ideation with Some Intent to Act, Without Specific Plan Description (Recent)     n     RETIRE: 5. Active Suicidal Ideation with Specific Plan and Intent (Lifetime)  No   No     RETIRED: 5. Active Suicidal Ideation with Specific Plan and Intent (Recent)  No   No     RETIRED: Active Suicidal Ideation with Specific Plan and Intent Description (Recent)     n     Most Severe Ideation Rating (Past Month)  1   1     Frequency (Past Month)  3   3     Duration (Past Month)  1   1     Controllability (Past Month)  1   1     Protective Factors (Past Month)  1   1     Reasons for Ideation (Past Month)  5   5      Actual Attempt (Lifetime)  No   No     Actual Attempt (Past 3 Months)  No   No     Has subject engaged in non-suicidal self-injurious behavior? (Lifetime)  No   Yes     Has subject engaged in non-suicidal self-injurious behavior? (Past 3 Months)  No   No     Interrupted Attempts (Lifetime)  No   No     Interrupted Attempts (Past 3 Months)  No   No     Aborted or Self-Interrupted Attempt (Lifetime)  No   No     Aborted or Self-Interrupted Attempt (Past 3 Months)  No   No     Preparatory Acts or Behavior (Lifetime)  No   No     Preparatory Acts or Behavior (Past 3 Months)  No   No         Personal and Family Medical History:  Patient does report a family history of mental health concerns.  Patient reports family history includes Anxiety Disorder in his brother, father, mother, and sister; Bladder Cancer in an other family member; Breast Cancer in his mother; Colon Polyps in his mother; Deep Vein Thrombosis (DVT) in his sister; Depression in his brother, father, mother, paternal grandfather, and sister; Diabetes in his mother; Heart Disease in his maternal grandfather and maternal grandmother; Hyperlipidemia in his father; Hypertension in his father and mother; Musculoskeletal Disorder in his brother, father, and mother; Obesity in his mother; Osteoporosis in his mother; Psychotic Disorder in his paternal grandfather; Pulmonary Embolism in his brother; Substance Abuse in his brother, father, and sister; Suicide in his paternal grandfather; Thyroid Disease in his mother; Ulcerative Colitis in his mother..     Patient does report Mental Health Diagnosis and/or Treatment.  Patient Patient reported the following previous diagnoses which include(s): an Anxiety Disorder, a Bipolar Disorder, Depression, and a Personality Disorder .  Patient reported symptoms began 2008.   Patient has received mental health services in the past: therapy with several previous providers, day treatment with [facility], psychiatry with  "VERNA, Dr. Angel at [facility]. , and partial hospitalization program with [facility] .  Psychiatric Hospitalizations: [facility] 3+ times, [facility] Jan 2008, and [facility] twice .  Patient denies a history of civil commitment.  Patient is receiving other mental health services.  These include psychiatry with Dr. CHENEY at [facility].  Next appointment: TBD - just saw last week .       Patient has had a physical exam to rule out medical causes for current symptoms.  Date of last physical exam was within the past year. Client was encouraged to follow up with PCP if symptoms were to develop. The patient has a [place] Primary Care Provider, who is named Ksenia Lyles..  Patient reports the following current medical concerns: type 2 diabetes, HTN, GERD, acquired hypothyroidism and the following current dental concerns: none .  Patient reports pain concerns including chronic pain in several areas of back and neck, .  Patient does want help addressing pain concerns..   There are not significant appetite / nutritional concerns / weight changes. These may include: no concerns. Patient reports the following sleep concerns:  No concerns.   Patient does report a history of head injury / trauma / cognitive impairment.  'Memory stuff isn't so hot - hard to remember names, stuff like that.'    Patient reports current meds as:   Current Outpatient Medications   Medication Sig Dispense Refill    acetaminophen 500 MG CAPS Take 500 mg by mouth every morning.      albuterol (PROAIR HFA/PROVENTIL HFA/VENTOLIN HFA) 108 (90 Base) MCG/ACT inhaler Inhale 2 puffs into the lungs every 6 hours as needed for shortness of breath, wheezing or cough 90 day supply 25.5 g 3    B-D LUER-LUCILLE SYRINGE 25G X 1\" 3 ML MISC USE WITH B12 INJECTIONS 12 each 0    bisacodyl (DULCOLAX) 5 MG EC tablet Take 10 mg by mouth as needed for constipation      buPROPion (WELLBUTRIN XL) 300 MG 24 hr tablet Take 1 " tablet by mouth daily      cholecalciferol (D3-50) 1250 mcg (40066 units) capsule TAKE ONE CAPSULE BY MOUTH EVERY 2 WEEKS 24 capsule 0    clonazePAM (KLONOPIN) 0.5 MG tablet Take 1 mg by mouth at bedtime.      clonazePAM (KLONOPIN) 0.5 MG tablet Take 0.5 mg by mouth daily as needed for anxiety.      cyanocobalamin (CYANOCOBALAMIN) 1000 MCG/ML injection INJECT 1 ML INTO THE MUSCLE EVERY 30 DAYS 10 mL 0    diphenhydrAMINE (BENADRYL) 25 MG capsule Take 25 mg by mouth once a week Before Enbrel injection      divalproex sodium delayed-release (DEPAKOTE) 125 MG DR tablet Take 125 mg by mouth every evening.      docusate sodium (COLACE) 50 MG capsule Take 100 mg by mouth 2 times daily      DULoxetine (CYMBALTA) 60 MG capsule Take 120 mg by mouth daily       EPINEPHrine (ANY BX GENERIC EQUIV) 0.3 MG/0.3ML injection 2-pack Inject 0.3 mLs (0.3 mg) into the muscle as needed for anaphylaxis May repeat one time in 5-15 minutes if response to initial dose is inadequate. 2 each 3    eszopiclone (LUNESTA) 3 MG tablet Take 3 mg by mouth At Bedtime       etanercept (ENBREL SURECLICK) 50 MG/ML autoinjector Inject 50 mg Subcutaneous once a week . Hold for signs of infection, and seek medical attention. 4 mL 7    famotidine (PEPCID) 20 MG tablet Take 20 mg by mouth daily      fenofibrate (TRICOR) 48 MG tablet TAKE ONE TABLET BY MOUTH ONCE DAILY 90 tablet 2    fexofenadine (ALLEGRA) 180 MG tablet Take 180 mg by mouth every evening.      fluticasone (FLONASE) 50 MCG/ACT nasal spray Spray 2 sprays in nostril every evening.      fluticasone-salmeterol (WIXELA INHUB) 100-50 MCG/ACT inhaler Inhale 1 puff into the lungs every 12 hours 3 each 3    guaiFENesin (MUCINEX) 600 MG 12 hr tablet Take 1,200 mg by mouth 2 times daily.      levothyroxine (SYNTHROID/LEVOTHROID) 75 MCG tablet TAKE ONE TABLET BY MOUTH EVERY MORNING 90 tablet 2    lidocaine (XYLOCAINE) 5 % external ointment Apply topically 2 times daily as needed for moderate pain 50 g 3     "Melatonin 10 MG TABS tablet Take 10 mg by mouth at bedtime      methocarbamol (ROBAXIN) 500 MG tablet TAKE 1-2 TABLETS BY MOUTH FOUR TIMES DAILY AS NEEDED FOR MUSCLE SPASMS 240 tablet 0    metoclopramide (REGLAN) 5 MG tablet Take 5 mg by mouth 4 times daily as needed.      metoprolol succinate ER (TOPROL XL) 100 MG 24 hr tablet TAKE 1 TABLET BY MOUTH IN THE EVENING; TO BE USED WITH 200MG IN MORNING 90 tablet 2    metoprolol succinate ER (TOPROL XL) 200 MG 24 hr tablet Take 200 mg by mouth daily.      montelukast (SINGULAIR) 10 MG tablet TAKE ONE TABLET BY MOUTH EVERY EVENING 90 tablet 0    nabumetone (RELAFEN) 500 MG tablet TAKE 1 TO 2 TABLETS BY MOUTH TWICE DAILY AS NEEDED FOR MODERATE PAIN 120 tablet 0    naloxone (NARCAN) 4 MG/0.1ML nasal spray Spray 4 mg into one nostril alternating nostrils as needed for opioid reversal every 2-3 minutes until assistance arrives. This medication is an antidote and stays on the chart as an \"as needed\" medication      omega-3 acid ethyl esters (LOVAZA) 1 g capsule TAKE TWO CAPSULES BY MOUTH TWICE DAILY 360 capsule 0    omeprazole (PRILOSEC) 20 MG DR capsule Take 20 mg by mouth daily.      OnabotulinumtoxinA (BOTOX IJ)       oxyCODONE (ROXICODONE) 5 MG tablet Take 1 tablet (5 mg) by mouth every 6 hours as needed for breakthrough pain. Ok to fill 09/21/24 and begin using on 09/23/24. 30 days supply for chronic pain 60 tablet 0    pregabalin (LYRICA) 150 MG capsule Take 150 mg by mouth 2 times daily.      pyridostigmine (MESTINON) 60 MG tablet Take 1 tablet by mouth 3 times daily      ramipril (ALTACE) 10 MG capsule TAKE ONE CAPSULE BY MOUTH ONCE DAILY 90 capsule 0    riboflavin 100 MG CAPS Take 200 mg by mouth daily Two hundred milligrams nightly to prevent migraine      risperiDONE (RISPERDAL) 0.5 MG tablet Take 0.5 mg by mouth 2 times daily as needed (aggitation).      rosuvastatin (CRESTOR) 40 MG tablet TAKE ONE TABLET BY MOUTH ONCE DAILY 90 tablet 2    Semaglutide, 1 MG/DOSE, " (OZEMPIC) 4 MG/3ML pen Inject 1 mg Subcutaneous once a week 3 mL 11    senna-docusate (SENOKOT-S/PERICOLACE) 8.6-50 MG tablet Take 1 tablet by mouth 2 times daily as needed for constipation. 40 tablet 0    sodium chloride 0.9 % neb solution Take 3 mLs by nebulization every 3 hours as needed      sodium fluoride 1.1 % CREA At Bedtime      triamcinolone (KENALOG) 0.1 % external cream Apply topically 2 times daily as needed for irritation. Apply for up to 2 weeks per month on hands      UBRELVY 100 MG tablet Take 100 mg by mouth at onset of headache      valACYclovir (VALTREX) 500 MG tablet Take 1 tablet (500 mg) by mouth daily 90 tablet 3    vitamin B complex with vitamin C (STRESS TAB) tablet Take 1 tablet by mouth daily      ZINC SULFATE-VITAMIN C MT Take 1 tablet by mouth daily       No current facility-administered medications for this visit.       Medication Adherence:  Patient reports taking psychiatric medications as prescribed.    Patient Allergies:    Allergies   Allergen Reactions    Amoxicillin-Pot Clavulanate Difficulty breathing    Banana Shortness Of Breath     Pt reports organic Banana is okay.     Clavulanic Acid Anaphylaxis     Other Reaction(s): Throat swelling    Nitroglycerin Palpitations    Penicillins Anaphylaxis    Provigil [Modafinil] Shortness Of Breath     headache    Gadolinium Hives and Itching     Patient was premedicated for the contrast allergy. He did still have a reaction a few hours after injection. Hives and itching. Dr. Gomez told tech to inform pt he should only have contrast again in the future when premedicated and at a hospital. Not at an outpatient facility.     Haemophilus B Polysaccharide Vaccine Rash     Quadrivalent, cell based    Influenza Virus Vaccine Rash     Quadrivalent, cell based    Ketoconazole      Topical cream caused swelling and itching    Dye [Contrast Dye] Other (See Comments) and Hives     Moderate flushing, CT contrast  Iodinated and gadolinium     Formoterol     Gabapentin      Other reaction(s): hives    Golimumab      Hives, bradycardia, face swelling    Mometasone     Naproxen      Other reaction(s): Bleeding Gums    Neurontin [Gabapentin] Hives     Moderate hives    Nortriptyline Hives    Nystatin Hives    Varicella Virus Vaccine Live      Rash    Adhesive Tape Rash    Baclofen Other (See Comments)     Cognitive changes    Flagyl [Metronidazole Hcl] Palpitations and Hives    Latex Rash    Metronidazole Palpitations, Other (See Comments) and Rash     dizziness (versus ciprofloxacin taken at same time)    Sulfamethizole Rash     Other Reaction(s): Rash       Medical History:    Past Medical History:   Diagnosis Date    Acne     Acquired hypothyroidism     Allergic state     Ankylosing spondylitis lumbar region (H)     Ankylosing spondylitis of sacral region (H)     Anxiety     Bipolar 2 disorder (H)     Chest pain     Chest pain, regulated w/BP meds. Clear arteries.    Chronic pain     Chronic, continuous use of opioids     DDD (degenerative disc disease), lumbar     Depressive disorder     Diabetes (H)     Diverticulosis     Dysautonomia (H)     Facet arthritis of cervical region     Gastroesophageal reflux disease     Hypertension     IBS (irritable bowel syndrome)     Intracranial arachnoid cyst     Lumbar radiculopathy     Major depressive disorder, recurrent episode (H)     Multiple psych providers - they manage meds August 2015: Provigil induced severe mood dis-function     Major depressive disorder, recurrent episode, severe (H) 12/02/2020    MDD (major depressive disorder), recurrent severe, without psychosis (H) 07/21/2021    Mixed dyslipidemia 04/06/2023    Obese     LLOYD (obstructive sleep apnea)- mild (AHI 11)     Polyneuropathy     POTS (postural orthostatic tachycardia syndrome)     Pulmonary embolism (H)     Skin exam, screening for cancer 12/03/2013    Sleep apnea     Thrombosis     Uncomplicated asthma          Current Mental Status Exam:    Appearance:  Appropriate    Eye Contact:  Fair   Psychomotor:  Normal       Gait / station:  no problem  Attitude / Demeanor: Cooperative   Speech      Rate / Production: Normal/ Responsive      Volume:  Soft       Language:  intact  Mood:   Anxious   Affect:   Blunted    Thought Content: Clear   Thought Process: Coherent       Associations: No loosening of associations  Insight:   Fair   Judgment:  Intact   Orientation:  All  Attention/concentration: Good    Substance Use:   Patient did report a family history of substance use concerns; see medical history section for details.  Patient has not received chemical dependency treatment in the past.  Patient has not ever been to detox.      Patient is not currently receiving any chemical dependency treatment.           Substance History of use Age of first use Date of last use     Pattern and duration of use (include amounts and frequency)   Alcohol used in the past   16 12/25/23 N/A   Cannabis   never used     REPORTS SUBSTANCE USE: N/A     Amphetamines    never used     REPORTS SUBSTANCE USE: N/A   Cocaine/crack     never used       REPORTS SUBSTANCE USE: N/A   Hallucinogens never used          REPORTS SUBSTANCE USE: N/A   Inhalants    never used       REPORTS SUBSTANCE USE: N/A   Heroin   never used        REPORTS SUBSTANCE USE: N/A   Other Opiates As prescribed  32  10/20/2024 REPORTS SUBSTANCE USE: reports using substance 6 times per day and has half a pill at a time.   Patient reports heaviest use was in the past under PCP care.   Benzodiazepine   As prescribed  32  10/20/2024 REPORTS SUBSTANCE USE: reports using substance 1-2 times per day and has 1-2 pills at a time.   Patient reports heaviest use is current use.   Barbiturates  Never used     REPORTS SUBSTANCE USE: N/A   Over the counter meds  Currently use  youth  10/20/2024 As prescribed/needed   Caffeine  Currently use 16  10/20/2024 REPORTS SUBSTANCE USE: reports using substance 1 times per day and has 2  24 oz cups of coffee at a time.   Patient reports heaviest use was in the past.   Nicotine   Never used     REPORTS SUBSTANCE USE: N/A   Other substances not listed above:  Identify:   Never used     REPORTS SUBSTANCE USE: N/A     Patient reported the following problems as a result of their substance use: no problems, not applicable.    Substance Use: No symptoms    Based on the CAGE score of 0 and clinical interview there  are not indications of drug or alcohol abuse.    Significant Losses / Trauma / Abuse / Neglect Issues:   Patient   did not serve in the .  There are indications or report of significant loss, trauma, abuse or neglect issues related to: are indications or report of significant loss, trauma, abuse or neglect issues related to and witnessed maternal grandfather die from massive heart attack in front of him at age 2, mother responded by socially isolated patient to 'protect' you .  Concerns for possible neglect are not present.     Safety Assessment:   Patient denies current homicidal ideation and behaviors.  Patient denies current self-injurious ideation and behaviors.    Patient denied risk behaviors associated with substance use.   Patient denies any high risk behaviors associated with mental health symptoms.  Patient reports the following current concerns for their personal safety: bullying: from other homeowners related to work on the [employer] .  Patient reports there are not  firearms in the house.       There are no firearms in the home..    History of Safety Concerns:  Patient denied a history of homicidal ideation.     Patient reported a history of personal safety concerns: history of cutting - none since about 2017  Patient denied a history of assaultive behaviors.    Patient denied a history of sexual assault behaviors.     Patient denied a history of risk behaviors associated with substance use.  Patient denies any history of high risk behaviors associated with mental health  symptoms.  Patient reports the following protective factors:  dedication to family or friends; effectively controls impulses; help seeking behaviors when distressed; adherence with prescribed medication; uses community crisis resources; healthy fear of risky behaviors or pain; financial stability; access to a variety of clinical interventions and pets    Vulnerability Assessment:    Does the patient have a history of vulnerability such as being teased, picked on, or other indications of potential safety issues with others ?  Yes: as a child felt he was often put down for being smart or 'being odd whatever that meant'    Does this patient have a history of being the victim of abuse? No history of abuse reported or documented.    Does this patient have a history of victimizing others or physical/sexual aggression? No     Does the patient have a history of boundary violations?  No.    Does the patient have a history of other sexual acting out behaviors (e.g grooming)?   No    Does the patient have a history of threats to self or others? Fire setting, running away or other self-injurious behaviors?    Yes: history of cutting, none since 2017    Has the patient required holds or restraints to manage behavior?  No    Does the patient s history indicate the need for special precautions or particular staffing patterns in the facility?  No      NOTE: If this screening indicates that the patient is at risk to harm self or others, notify staff at referral location.    Risk Plan:  See Recommendations for Safety and Risk Management Plan    Review of Symptoms per patient report:   Depression: Lack of interest or pleasure in doing things, Feeling sad, down, or depressed, Feelings of hopelessness, Low self-worth, Difficulties concentrating, Psychomotor slowing or agitation, Suicidal ideation, Feelings of helplessness, Ruminations, Irritability, Withdrawn, and Poor hygeine  Regina:  No Symptoms  Psychosis: Auditory hallucinations and  Olfactory hallucinations  Anxiety: Excessive worry, Nervousness, Physical complaints, such as headaches, stomachaches, muscle tension, Social anxiety, Fears/phobias going outside, Psychomotor agitation, Ruminations, Poor concentration, and Irritability  Panic:  No symptoms  Post Traumatic Stress Disorder:  Experienced traumatic event witnessed grandfather's death and No Symptoms   Eating Disorder: No Symptoms  ADD / ADHD:  No symptoms  Conduct Disorder: No symptoms  Autism Spectrum Disorder: No symptoms  Obsessive Compulsive Disorder: No Symptoms    Patient reports the following compulsive behaviors and treatment history:  none .      Diagnostic Criteria:   Generalized Anxiety Disorder  A. Excessive anxiety and worry about a number of events or activities (such as work or school performance).   B. The person finds it difficult to control the worry.  C. Select 3 or more symptoms (required for diagnosis). Only one item is required in children.   - Restlessness or feeling keyed up or on edge.    - Difficulty concentrating or mind going blank.    - Irritability.    - Muscle tension.   D. The focus of the anxiety and worry is not confined to features of an Axis I disorder.  E. The anxiety, worry, or physical symptoms cause clinically significant distress or impairment in social, occupational, or other important areas of functioning.   F. The disturbance is not due to the direct physiological effects of a substance (e.g., a drug of abuse, a medication) or a general medical condition (e.g., hyperthyroidism) and does not occur exclusively during a Mood Disorder, a Psychotic Disorder, or a Pervasive Developmental Disorder.    - The aformentioned symptoms began as a child year(s) ago and occurs 1 days per week and is experienced as mild. Major Depressive Disorder  CRITERIA (A-C) REPRESENT A MAJOR DEPRESSIVE EPISODE - SELECT THESE CRITERIA  A) Recurrent episode(s) - symptoms have been present during the same 2-week period and  represent a change from previous functioning 5 or more symptoms (required for diagnosis)   - Depressed mood. Note: In children and adolescents, can be irritable mood.     - Diminished interest or pleasure in all, or almost all, activities.    - Fatigue or loss of energy.    - Feelings of worthlessness or denies guilt.    - Diminished ability to think or concentrate, or indecisiveness.    - Recurrent thoughts of death (not just fear of dying), recurrent suicidal ideation without a specific plan, or a suicide attempt or a specific plan for committing suicide.   B) The symptoms cause clinically significant distress or impairment in social, occupational, or other important areas of functioning  C) The episode is not attributable to the physiological effects of a substance or to another medical condition  D) The occurence of major depressive episode is not better explained by other thought / psychotic disorders  E) There has never been a manic episode or hypomanic episode     Functional Status:  Patient reports the following functional impairments:  chronic disease management, health maintenance, home life with completion of ADLS, management of the household and or completion of tasks, organization, relationship(s), self-care, social interactions, and work / vocational responsibilities.     Nonprogrammatic care:  Patient is requesting basic services to address current mental health concerns.    Clinical Summary:  1. Psychosocial, Cultural and Contextual Factors: chronic pain and health issues, socially isolated, recent back surgery, finances, mental health history and current mental scar symptoms/concerns .  2. Principal DSM5 Diagnoses  (Sustained by DSM5 Criteria Listed Above):   296.32 (F33.1) Major Depressive Disorder, Recurrent Episode, Moderate _.  3. Other Diagnoses that is relevant to services:   300.02 (F41.1) Generalized Anxiety Disorder.  4. Provisional Diagnosis:  301.83 (F60.3) Borderline Personality  Disorder as evidenced by self-report of previous diagnosis - will continue to assess.  5. Prognosis: Expect Improvement.  6. Likely consequences of symptoms if not treated: worsening of symptoms.  7. Client strengths include:  empathetic, good listener, has a previous history of therapy, insightful, motivated, open to suggestions / feedback, and wants to learn .     Recommendations:     1. Plan for Safety and Risk Management:   Safety and Risk: A safety and risk management plan has been developed including: Patient consented to co-developed safety plan on 10/21/2023.  Safety and risk management plan was reviewed.   Patient agreed to use safety plan should any safety concerns arise.  A copy was made available to the patient..          Report to child / adult protection services was NA.     2. Patient's identified  n/a .     3. Initial Treatment will focus on:   Depressed Mood - improve overall baseline mood  Anxiety - increase overall calm mindset.     4. Resources/Service Plan:    services are not indicated.   Modifications to assist communication are not indicated.   Additional disability accommodations are not indicated.      5. Collaboration:   Collaboration / coordination of treatment will be initiated with the following  support professionals:  pain management as needed .      6.  Referrals:   The following referral(s) will be initiated:  n/a .       A Release of Information has been obtained for the following:  n/a .     Clinical Substantiation/medical necessity for the above recommendations:  n/a.    7. TRACEY:    TRACEY:  Discussed the general effects of drugs and alcohol on health and well-being. Provider gave patient printed information about the  effects of chemical use on their health and well being. Recommendations:  n/a .     8. Records:   These were not available for review at time of assessment.   Information in this assessment was obtained from the medical record and  provided by patient who is  a good historian.    Patient will have open access to their mental health medical record.    9.   Interactive Complexity: Yes, visit entailed Interactive Complexity evidenced by: Patient's presenting concerns require more intensive intervention than could be completed within the usual service. Provider used clinical judgment in determining the necessary length for the session to benefit the patient's needs.       10. Safety Plan:       Provider Name/ Credentials:  Shirley Bartlett PsyD LP October 21, 2024

## 2024-10-18 ENCOUNTER — E-VISIT (OUTPATIENT)
Dept: FAMILY MEDICINE | Facility: CLINIC | Age: 49
End: 2024-10-18
Payer: MEDICARE

## 2024-10-18 DIAGNOSIS — U07.1 INFECTION DUE TO 2019 NOVEL CORONAVIRUS: ICD-10-CM

## 2024-10-18 DIAGNOSIS — J06.9 UPPER RESPIRATORY TRACT INFECTION, UNSPECIFIED TYPE: Primary | ICD-10-CM

## 2024-10-18 PROCEDURE — 99421 OL DIG E/M SVC 5-10 MIN: CPT | Performed by: FAMILY MEDICINE

## 2024-10-18 RX ORDER — PREDNISONE 20 MG/1
40 TABLET ORAL DAILY
Qty: 14 TABLET | Refills: 0 | Status: SHIPPED | OUTPATIENT
Start: 2024-10-18 | End: 2024-10-25

## 2024-10-20 ENCOUNTER — MYC REFILL (OUTPATIENT)
Dept: FAMILY MEDICINE | Facility: CLINIC | Age: 49
End: 2024-10-20
Payer: MEDICARE

## 2024-10-20 DIAGNOSIS — M51.369 DDD (DEGENERATIVE DISC DISEASE), LUMBAR: ICD-10-CM

## 2024-10-20 DIAGNOSIS — M45.8 ANKYLOSING SPONDYLITIS OF SACRAL REGION (H): ICD-10-CM

## 2024-10-20 DIAGNOSIS — M47.816 LUMBAR FACET ARTHROPATHY: ICD-10-CM

## 2024-10-20 DIAGNOSIS — G43.111 INTRACTABLE MIGRAINE WITH AURA WITH STATUS MIGRAINOSUS: ICD-10-CM

## 2024-10-20 DIAGNOSIS — G89.29 CHRONIC INTRACTABLE PAIN: ICD-10-CM

## 2024-10-20 DIAGNOSIS — G62.9 PERIPHERAL POLYNEUROPATHY: ICD-10-CM

## 2024-10-20 RX ORDER — OXYCODONE HYDROCHLORIDE 5 MG/1
5 TABLET ORAL EVERY 6 HOURS PRN
Qty: 60 TABLET | Refills: 0 | Status: CANCELLED | OUTPATIENT
Start: 2024-10-20

## 2024-10-20 ASSESSMENT — PATIENT HEALTH QUESTIONNAIRE - PHQ9
10. IF YOU CHECKED OFF ANY PROBLEMS, HOW DIFFICULT HAVE THESE PROBLEMS MADE IT FOR YOU TO DO YOUR WORK, TAKE CARE OF THINGS AT HOME, OR GET ALONG WITH OTHER PEOPLE: SOMEWHAT DIFFICULT
SUM OF ALL RESPONSES TO PHQ QUESTIONS 1-9: 7
SUM OF ALL RESPONSES TO PHQ QUESTIONS 1-9: 7

## 2024-10-21 ENCOUNTER — NURSE TRIAGE (OUTPATIENT)
Dept: FAMILY MEDICINE | Facility: CLINIC | Age: 49
End: 2024-10-21
Payer: MEDICARE

## 2024-10-21 ENCOUNTER — VIRTUAL VISIT (OUTPATIENT)
Dept: PSYCHOLOGY | Facility: CLINIC | Age: 49
End: 2024-10-21
Payer: MEDICARE

## 2024-10-21 DIAGNOSIS — F41.1 GENERALIZED ANXIETY DISORDER: ICD-10-CM

## 2024-10-21 DIAGNOSIS — F33.1 MAJOR DEPRESSIVE DISORDER, RECURRENT EPISODE, MODERATE (H): Primary | ICD-10-CM

## 2024-10-21 DIAGNOSIS — M54.16 LUMBAR RADICULOPATHY: Primary | ICD-10-CM

## 2024-10-21 PROCEDURE — 90791 PSYCH DIAGNOSTIC EVALUATION: CPT | Mod: 95 | Performed by: PSYCHOLOGIST

## 2024-10-21 RX ORDER — OXYCODONE HYDROCHLORIDE 5 MG/1
5 TABLET ORAL EVERY 6 HOURS PRN
Qty: 42 TABLET | Refills: 0 | Status: SHIPPED | OUTPATIENT
Start: 2024-10-21 | End: 2024-11-05

## 2024-10-21 ASSESSMENT — COLUMBIA-SUICIDE SEVERITY RATING SCALE - C-SSRS
4. HAVE YOU HAD THESE THOUGHTS AND HAD SOME INTENTION OF ACTING ON THEM?: YES
2. HAVE YOU ACTUALLY HAD ANY THOUGHTS OF KILLING YOURSELF?: NO
TOTAL  NUMBER OF INTERRUPTED ATTEMPTS LIFETIME: NO
6. HAVE YOU EVER DONE ANYTHING, STARTED TO DO ANYTHING, OR PREPARED TO DO ANYTHING TO END YOUR LIFE?: NO
5. HAVE YOU STARTED TO WORK OUT OR WORKED OUT THE DETAILS OF HOW TO KILL YOURSELF? DO YOU INTEND TO CARRY OUT THIS PLAN?: NO
REASONS FOR IDEATION PAST MONTH: DOES NOT APPLY
REASONS FOR IDEATION LIFETIME: EQUALLY TO GET ATTENTION, REVENGE, OR A REACTION FROM OTHERS AND TO END/STOP THE PAIN
4. HAVE YOU HAD THESE THOUGHTS AND HAD SOME INTENTION OF ACTING ON THEM?: NO
2. HAVE YOU ACTUALLY HAD ANY THOUGHTS OF KILLING YOURSELF?: YES
ATTEMPT LIFETIME: NO
3. HAVE YOU BEEN THINKING ABOUT HOW YOU MIGHT KILL YOURSELF?: YES
TOTAL  NUMBER OF ABORTED OR SELF INTERRUPTED ATTEMPTS LIFETIME: NO
1. IN THE PAST MONTH, HAVE YOU WISHED YOU WERE DEAD OR WISHED YOU COULD GO TO SLEEP AND NOT WAKE UP?: NO
1. HAVE YOU WISHED YOU WERE DEAD OR WISHED YOU COULD GO TO SLEEP AND NOT WAKE UP?: YES

## 2024-10-21 NOTE — TELEPHONE ENCOUNTER
Called Tito to check in. He reports an aggravated moderate pain in his back and buttocks since was diagnosed with COVID on 10/18/2024. Denies sensory or motor issues in legs/feet. Takes oxycodone 5 mg every 6 hours, Flexeril 1000 mg TID, Tylenol prn, uses ice/heat.  He requested another oxycodone refill (last Rx # 60 on 09/27/2024).  Follow up appt on 11/07/2024.  Lakisha Otoole RN, CNRN

## 2024-10-21 NOTE — TELEPHONE ENCOUNTER
"Reason for Disposition   Dry cough (non-productive; no sputum or minimal clear sputum) and within 14 days of COVID-19 Exposure   Patient sounds very sick or weak to the triager    Answer Assessment - Initial Assessment Questions  1. ONSET: \"When did the cough begin?\"       Today, currently on Paxlovid for COVID, symptoms started Thursday, on a steroid as well    2. SEVERITY: \"How bad is the cough today?\"       Cough has to be initiated by the patient meaning that he isn't coughing uncontrollably    3. SPUTUM: \"Describe the color of your sputum\" (none, dry cough; clear, white, yellow, green)      No    4. HEMOPTYSIS: \"Are you coughing up any blood?\" If so ask: \"How much?\" (flecks, streaks, tablespoons, etc.)      No    5. DIFFICULTY BREATHING: \"Are you having difficulty breathing?\" If Yes, ask: \"How bad is it?\" (e.g., mild, moderate, severe)     - MILD: No SOB at rest, mild SOB with walking, speaks normally in sentences, can lie down, no retractions, pulse < 100.     - MODERATE: SOB at rest, SOB with minimal exertion and prefers to sit, cannot lie down flat, speaks in phrases, mild retractions, audible wheezing, pulse 100-120.     - SEVERE: Very SOB at rest, speaks in single words, struggling to breathe, sitting hunched forward, retractions, pulse > 120       Has been using inhaler. Inhaler does provide relief     6. FEVER: \"Do you have a fever?\" If Yes, ask: \"What is your temperature, how was it measured, and when did it start?\"      No    7. CARDIAC HISTORY: \"Do you have any history of heart disease?\" (e.g., heart attack, congestive heart failure)       No    8. LUNG HISTORY: \"Do you have any history of lung disease?\"  (e.g., pulmonary embolus, asthma, emphysema)      Asthma    9. PE RISK FACTORS: \"Do you have a history of blood clots?\" (or: recent major surgery, recent prolonged travel, bedridden)      PE    10. OTHER SYMPTOMS: \"Do you have any other symptoms?\" (e.g., runny nose, wheezing, chest pain)        Pain " "in chest with coughing    11. PREGNANCY: \"Is there any chance you are pregnant?\" \"When was your last menstrual period?\"        Not applicable    12. TRAVEL: \"Have you traveled out of the country in the last month?\" (e.g., travel history, exposures)        no    Protocols used: Cough-A-[place], Coronavirus (COVID-19) Diagnosed or [id number]-A-PK    Patient verbalized understanding and agrees with plan. Advised to call with questions or concerns. Festus Rowley RN, BSN    "

## 2024-10-28 ENCOUNTER — VIRTUAL VISIT (OUTPATIENT)
Dept: FAMILY MEDICINE | Facility: CLINIC | Age: 49
End: 2024-10-28
Payer: MEDICARE

## 2024-10-28 DIAGNOSIS — J01.90 ACUTE SINUSITIS WITH SYMPTOMS > 10 DAYS: Primary | ICD-10-CM

## 2024-10-28 DIAGNOSIS — B02.9 HERPES ZOSTER WITHOUT COMPLICATION: ICD-10-CM

## 2024-10-28 DIAGNOSIS — M51.369 DDD (DEGENERATIVE DISC DISEASE), LUMBAR: ICD-10-CM

## 2024-10-28 PROCEDURE — 99213 OFFICE O/P EST LOW 20 MIN: CPT | Mod: 95 | Performed by: PHYSICIAN ASSISTANT

## 2024-10-28 PROCEDURE — G2211 COMPLEX E/M VISIT ADD ON: HCPCS | Mod: 95 | Performed by: PHYSICIAN ASSISTANT

## 2024-10-28 RX ORDER — DOXYCYCLINE 100 MG/1
100 CAPSULE ORAL 2 TIMES DAILY
Qty: 14 CAPSULE | Refills: 0 | Status: SHIPPED | OUTPATIENT
Start: 2024-10-28 | End: 2024-11-05

## 2024-10-28 NOTE — PATIENT INSTRUCTIONS
Acute Sinusitis: Care Instructions  Overview     Acute sinusitis is an inflammation of the mucous membranes inside the nose and sinuses. Sinuses are the hollow spaces in your skull around the eyes and nose. Acute sinusitis often follows a cold. Acute sinusitis causes thick, discolored mucus that drains from the nose or down the back of the throat. It also can cause pain and pressure in your head and face along with a stuffy or blocked nose.  In most cases, sinusitis gets better on its own in 1 to 2 weeks. But some mild symptoms may last for several weeks. Sometimes antibiotics are needed if there is a bacterial infection.  Follow-up care is a key part of your treatment and safety. Be sure to make and go to all appointments, and call your doctor if you are having problems. It's also a good idea to know your test results and keep a list of the medicines you take.  How can you care for yourself at home?  Use saline (saltwater) nasal washes. This can help keep your nasal passages open and wash out mucus and allergens.  You can buy saline nose washes at a grocery store or drugstore. Follow the instructions on the package.  You can make your own at home. Add 1 teaspoon of non-iodized salt and 1 teaspoon of baking soda to 2 cups of distilled or boiled and cooled water. Fill a squeeze bottle or a nasal cleansing pot (such as a neti pot) with the nasal wash. Then put the tip into your nostril, and lean over the sink. With your mouth open, gently squirt the liquid. Repeat on the other side.  Try a decongestant nasal spray like oxymetazoline (Afrin). Do not use it for more than 3 days in a row. Using it for more than 3 days can make your congestion worse.  If needed, take an over-the-counter pain medicine, such as acetaminophen (Tylenol), ibuprofen (Advil, Motrin), or naproxen (Aleve). Read and follow all instructions on the label.  If the doctor prescribed antibiotics, take them as directed. Do not stop taking them just  "because you feel better. You need to take the full course of antibiotics.  Be careful when taking over-the-counter cold or flu medicines and Tylenol at the same time. Many of these medicines have acetaminophen, which is Tylenol. Read the labels to make sure that you are not taking more than the recommended dose. Too much acetaminophen (Tylenol) can be harmful.  Try a steroid nasal spray. It may help with your symptoms.  Breathe warm, moist air. You can use a steamy shower, a hot bath, or a sink filled with hot water. Avoid cold, dry air. Using a humidifier in your home may help. Follow the directions for cleaning the machine.  When should you call for help?   Call your doctor now or seek immediate medical care if:    You have new or worse swelling, redness, or pain in your face or around one or both of your eyes.     You have double vision or a change in your vision.     You have a high fever.     You have a severe headache and a stiff neck.     You have mental changes, such as feeling confused or much less alert.   Watch closely for changes in your health, and be sure to contact your doctor if:    You are not getting better as expected.   Where can you learn more?  Go to https://www.[employer].net/patiented  Enter I933 in the search box to learn more about \"Acute Sinusitis: Care Instructions.\"  Current as of: September 27, 2023  Content Version: 14.2 2024 [facility] [email or web address].   Care instructions adapted under license by your healthcare professional. If you have questions about a medical condition or this instruction, always ask your healthcare professional. [employer] disclaims any warranty or liability for your use of this information.    "

## 2024-10-28 NOTE — PROGRESS NOTES
"Tito is a 49 year old who is being evaluated via a billable video visit.    How would you like to obtain your AVS? MyChart  If the video visit is dropped, the invitation should be resent by: Text to cell phone: [phone]  Will anyone else be joining your video visit? No      Assessment & Plan     Acute sinusitis with symptoms > 10 days  Discussed with patient symptoms are consistent with sinus infection. Symptoms have been persisting long enough where an antibiotic would be warranted. Side effects of medication discussed. In addition I recommend warm compress's over the sinus's, nasal sprays, and sinus rinses. OTC pain relievers as needed. If symptoms do not improve after completing antibiotic then please follow up in clinic or sooner if symptoms worsen. Patient agree's with this plan and has no further questions  - doxycycline hyclate (VIBRAMYCIN) 100 MG capsule; Take 1 capsule (100 mg) by mouth 2 times daily.          BMI  Estimated body mass index is 34.45 kg/m  as calculated from the following:    Height as of 9/16/24: 1.93 m (6' 4\").    Weight as of 9/25/24: 128.4 kg (283 lb).             Subjective   Tito is a 49 year old, presenting for the following health issues:  Sinus Problem        10/28/2024     3:42 PM   Additional Questions   Roomed by Theresa   Accompanied by none         10/28/2024     3:42 PM   Patient Reported Additional Medications   Patient reports taking the following new medications none     Video Start Time: 4:21 PM    History of Present Illness       Headaches:   Since the patient's last clinic visit, headaches are: worsened  The patient is getting headaches:  Daily  He is not able to do normal daily activities when he has a migraine.  The patient is taking the following rescue/relief medications:  Other   Patient states \"I get some relief\" from the rescue/relief medications.   The patient is taking the following medications to prevent migraines:  Depakote and other  In the past 4 " weeks, the patient has gone to an Urgent Care or Emergency Room 0 times times due to headaches.    Reason for visit:  Sinus infection?  Symptom onset:  1-2 weeks ago  Symptoms include:  Bilateral pain and pressure in my temples, between my eyes, etc.  Symptom intensity:  Moderate  Symptom progression:  Worsening  Had these symptoms before:  Yes  Has tried/received treatment for these symptoms:  Yes  Previous treatment was successful:  Yes  Prior treatment description:  Antibiotic   He is taking medications regularly.       Acute Illness  Acute illness concerns: URI  Onset/Duration: 2 weeks  Symptoms:  Fever: No  Chills/Sweats: No  Headache (location?): YES  Sinus Pressure: YES  Conjunctivitis:  No  Ear Pain: YES: bilateral  Rhinorrhea: YES  Congestion: YES - yellow mucus  Sore Throat: postnasal discharge  Cough: Yes - clear   Wheeze: No  Decreased Appetite: No  Nausea: YES  Vomiting: No  Diarrhea: YES  Dysuria/Freq.: No  Dysuria or Hematuria: No  Fatigue/Achiness: YES  Sick/Strep Exposure: No  Therapies tried and outcome: nasal spray, migraine medication,prednisone, paxlovid  Additional provider notes :   Patient was diagnosis and treatment for COVID-19 about 2 weeks ago. Was feeling somewhat better but then started to having worsening sinus pressure and congestion. He was seen at [facility] and was given prednisone. States it kind of helped but still having symptoms.       Review of Systems  Constitutional, HEENT, cardiovascular, pulmonary, gi and gu systems are negative, except as otherwise noted.      Objective           Vitals:  No vitals were obtained today due to virtual visit.    Physical Exam   GENERAL: alert and no distress  EYES: Eyes grossly normal to inspection.  No discharge or erythema, or obvious scleral/conjunctival abnormalities.  RESP: No audible wheeze, cough, or visible cyanosis.    SKIN: Visible skin clear. No significant rash, abnormal pigmentation or lesions.  PSYCH: Appropriate affect, tone,  and pace of words          Video-Visit Details    Type of service:  Video Visit   Video End Time:4:34 PM  Originating Location (pt. Location): Home    Distant Location (provider location):  Off-site  Platform used for Video Visit: Sophia  Signed Electronically by: KEVAN Richter

## 2024-10-29 RX ORDER — VALACYCLOVIR HYDROCHLORIDE 500 MG/1
500 TABLET, FILM COATED ORAL DAILY
Qty: 90 TABLET | Refills: 2 | Status: SHIPPED | OUTPATIENT
Start: 2024-10-29

## 2024-10-29 RX ORDER — METHOCARBAMOL 500 MG/1
TABLET, FILM COATED ORAL
Qty: 240 TABLET | Refills: 0 | Status: SHIPPED | OUTPATIENT
Start: 2024-10-29

## 2024-10-29 ASSESSMENT — PAIN SCALES - PAIN ENJOYMENT GENERAL ACTIVITY SCALE (PEG)
AVG_PAIN_PASTWEEK: 5
PEG_TOTALSCORE: 5
INTERFERED_ENJOYMENT_LIFE: 5
INTERFERED_GENERAL_ACTIVITY: 5

## 2024-10-29 ASSESSMENT — PATIENT HEALTH QUESTIONNAIRE - PHQ9
SUM OF ALL RESPONSES TO PHQ QUESTIONS 1-9: 14
10. IF YOU CHECKED OFF ANY PROBLEMS, HOW DIFFICULT HAVE THESE PROBLEMS MADE IT FOR YOU TO DO YOUR WORK, TAKE CARE OF THINGS AT HOME, OR GET ALONG WITH OTHER PEOPLE: VERY DIFFICULT
SUM OF ALL RESPONSES TO PHQ QUESTIONS 1-9: 14

## 2024-10-30 ENCOUNTER — VIRTUAL VISIT (OUTPATIENT)
Dept: PSYCHOLOGY | Facility: CLINIC | Age: 49
End: 2024-10-30
Payer: MEDICARE

## 2024-10-30 ENCOUNTER — LAB (OUTPATIENT)
Dept: LAB | Facility: CLINIC | Age: 49
End: 2024-10-30
Payer: MEDICARE

## 2024-10-30 DIAGNOSIS — F33.1 MAJOR DEPRESSIVE DISORDER, RECURRENT EPISODE, MODERATE (H): Primary | ICD-10-CM

## 2024-10-30 DIAGNOSIS — M45.9 ANKYLOSING SPONDYLITIS, UNSPECIFIED SITE OF SPINE (H): ICD-10-CM

## 2024-10-30 DIAGNOSIS — F41.1 GENERALIZED ANXIETY DISORDER: ICD-10-CM

## 2024-10-30 LAB
BASOPHILS # BLD AUTO: 0.1 10E3/UL (ref 0–0.2)
BASOPHILS NFR BLD AUTO: 1 %
EOSINOPHIL # BLD AUTO: 0.1 10E3/UL (ref 0–0.7)
EOSINOPHIL NFR BLD AUTO: 2 %
ERYTHROCYTE [DISTWIDTH] IN BLOOD BY AUTOMATED COUNT: 12.9 % (ref 10–15)
ERYTHROCYTE [SEDIMENTATION RATE] IN BLOOD BY WESTERGREN METHOD: 10 MM/HR (ref 0–20)
HCT VFR BLD AUTO: 44.2 % (ref 40–53)
HGB BLD-MCNC: 14.6 G/DL (ref 13.3–17.7)
IMM GRANULOCYTES # BLD: 0.1 10E3/UL
IMM GRANULOCYTES NFR BLD: 2 %
LYMPHOCYTES # BLD AUTO: 2.9 10E3/UL (ref 0.8–5.3)
LYMPHOCYTES NFR BLD AUTO: 31 %
MCH RBC QN AUTO: 31.4 PG (ref 26.5–33)
MCHC RBC AUTO-ENTMCNC: 33 G/DL (ref 31.5–36.5)
MCV RBC AUTO: 95 FL (ref 78–100)
MONOCYTES # BLD AUTO: 1.2 10E3/UL (ref 0–1.3)
MONOCYTES NFR BLD AUTO: 13 %
NEUTROPHILS # BLD AUTO: 4.9 10E3/UL (ref 1.6–8.3)
NEUTROPHILS NFR BLD AUTO: 53 %
PLATELET # BLD AUTO: 194 10E3/UL (ref 150–450)
RBC # BLD AUTO: 4.65 10E6/UL (ref 4.4–5.9)
WBC # BLD AUTO: 9.3 10E3/UL (ref 4–11)

## 2024-10-30 PROCEDURE — 80053 COMPREHEN METABOLIC PANEL: CPT

## 2024-10-30 PROCEDURE — 85652 RBC SED RATE AUTOMATED: CPT

## 2024-10-30 PROCEDURE — 36415 COLL VENOUS BLD VENIPUNCTURE: CPT

## 2024-10-30 PROCEDURE — 85025 COMPLETE CBC W/AUTO DIFF WBC: CPT

## 2024-10-30 PROCEDURE — 90837 PSYTX W PT 60 MINUTES: CPT | Mod: 95 | Performed by: PSYCHOLOGIST

## 2024-10-30 PROCEDURE — 86140 C-REACTIVE PROTEIN: CPT

## 2024-10-30 NOTE — PROGRESS NOTES
"[facility]                                     Progress Note    Patient Name: Joel Pineda  Date: October 30, 2024          Service Type: Individual      Session Start Time: 4:00 PM  Session End Time: 4:55 PM     Session Length: 55    Session #: 2    Attendees: Client attended alone    Service Modality:  Video Visit:      Provider verified identity through the following two step process.  Patient provided:  Patient photo and Patient is known previously to provider    Telemedicine Visit: The patient's condition can be safely assessed and treated via synchronous audio and visual telemedicine encounter.      Reason for Telemedicine Visit: Patient has requested telehealth visit    Originating Site (Patient Location): Patient's home    Distant Site (Provider Location): Provider Remote Setting- Home Office    Consent:  The patient/guardian has verbally consented to: the potential risks and benefits of telemedicine (video visit) versus in person care; bill my insurance or make self-payment for services provided; and responsibility for payment of non-covered services.     Patient would like the video invitation sent by:  Text to cell phone: [phone]    Mode of Communication:  [facility]    Distant Location (Provider):  Off-site    As the provider I attest to compliance with applicable laws and regulations related to telemedicine.    DATA  Interactive Complexity: Yes, visit entailed Interactive Complexity evidenced by:Patient's presenting concerns require more intensive intervention than could be completed within the usual service. Provider used clinical judgment in determining the necessary length for the session to benefit the patient's needs.     Crisis: No        Progress Since Last Session (Related to Symptoms / Goals / Homework):   Symptoms: No change \"I'm still mary in the mud\" - report rumination, low mood    Homework: Achieved / completed to satisfaction - identified " goals for treatment and co-created treatment plan.      Episode of Care Goals: Satisfactory progress - CONTEMPLATION (Considering change and yet undecided); Intervened by assessing the negative and positive thinking (ambivalence) about behavior change     Current / Ongoing Stressors and Concerns:   Getting over covid, turned into sinus infection. Ruminating about recent past 'and recent past being the past decade'. Acknowledge you have a lot of time on your hands to think. Exploring some volunteer options. Ongoing worry thoughts about leg numbness since surgery - encouraged to reach out to surgeon about numb sensations. Trying to identify what to do for holidays and travel, especially with lifting luggage. Acknowledge tendency to go into negative spiral thinking of worst case scenario and you would like to change that.      Treatment Objective(s) Addressed in This Session:   use cognitive strategies identified in therapy to challenge anxious thoughts  Decrease frequency and intensity of feeling down, depressed, hopeless       Intervention:   Interpersonal Therapy: nonjudgmental, positive regard   CBT: cognitive challenging, restructuring, reframing Behavioral activation techniques  PCT: supportive autonomy, unconditional positive regard, empathic reflection, active listening  Solution Focused: identified solvable problems and generated possible solutions    Assessments completed prior to visit:  The following assessments were completed by patient for this visit:  PHQ9:       8/3/2023     7:16 PM 9/12/2023    10:10 AM 10/25/2023     6:30 PM 2/29/2024     9:50 AM 9/15/2024     8:14 AM 10/20/2024     2:17 PM 10/29/2024     4:30 PM   PHQ-9 SCORE   PHQ-9 Total Score MyChart 10 (Moderate depression) 12 (Moderate depression) 11 (Moderate depression) 16 (Moderately severe depression) 10 (Moderate depression) 7 (Mild depression) 14 (Moderate depression)   PHQ-9 Total Score 10 12 11 16 10 7 14        Patient-reported      GAD7:       12/26/2023    10:19 AM 3/19/2024     9:11 AM 4/6/2024    10:04 AM 4/22/2024    10:44 AM 7/18/2024     9:53 AM 8/1/2024     9:28 AM 10/20/2024     2:25 PM   YUDI-7 SCORE   Total Score 7 (mild anxiety) 10 (moderate anxiety) 9 (mild anxiety) 8 (mild anxiety) 11 (moderate anxiety) 7 (mild anxiety) 7 (mild anxiety)   Total Score 7 10 9 8 11 7 7     PROMIS 10-Global Health (all questions and answers displayed):       3/19/2024     9:13 AM 4/6/2024    10:08 AM 4/22/2024    10:45 AM 7/18/2024     9:55 AM 8/1/2024     9:29 AM 8/17/2024     1:12 PM 10/20/2024     2:19 PM   PROMIS 10   In general, would you say your health is: Fair Fair Fair Fair Fair Fair Fair   In general, would you say your quality of life is: Fair Fair Fair Fair Fair Fair Fair   In general, how would you rate your physical health? Fair Fair Fair Fair Fair Fair Fair   In general, how would you rate your mental health, including your mood and your ability to think? Fair Fair Good Good Fair Fair Fair   In general, how would you rate your satisfaction with your social activities and relationships? Fair Fair Fair Poor Poor Poor Poor   In general, please rate how well you carry out your usual social activities and roles Fair Fair Fair Poor Poor Fair Poor   To what extent are you able to carry out your everyday physical activities such as walking, climbing stairs, carrying groceries, or moving a chair? Mostly A little A little Moderately Moderately Moderately A little   In the past 7 days, how often have you been bothered by emotional problems such as feeling anxious, depressed, or irritable? Often Often Sometimes Often Often Often Sometimes   In the past 7 days, how would you rate your fatigue on average? Moderate Severe Severe Moderate Mild Mild Mild   In the past 7 days, how would you rate your pain on average, where 0 means no pain, and 10 means worst imaginable pain? 5 6 4 5 5 4 5   In general, would you say your health is: 2  2  2  2  2  2  2     In general, would you say your quality of life is: 2  2  2  2  2  2  2    In general, how would you rate your physical health? 2  2  2  2  2  2  2    In general, how would you rate your mental health, including your mood and your ability to think? 2  2  3  3  2  2  2    In general, how would you rate your satisfaction with your social activities and relationships? 2  2  2  1  1  1  1    In general, please rate how well you carry out your usual social activities and roles. (This includes activities at home, at work and in your community, and responsibilities as a parent, child, spouse, employee, friend, etc.) 2  2  2  1  1  2  1    To what extent are you able to carry out your everyday physical activities such as walking, climbing stairs, carrying groceries, or moving a chair? 4  2  2  3  3  3  2    In the past 7 days, how often have you been bothered by emotional problems such as feeling anxious, depressed, or irritable? 4  4  3  4  4  4  3    In the past 7 days, how would you rate your fatigue on average? 3  4  4  3  2  2  2    In the past 7 days, how would you rate your pain on average, where 0 means no pain, and 10 means worst imaginable pain? 5  6  4  5  5  4  5    Global Mental Health Score 8 8 10 8 7 7    7 8   Global Physical Health Score 12 9 9 11 12 12    12 11   PROMIS TOTAL - SUBSCORES 20 17 19 19 19 19    19 19       Patient-reported    Multiple values from one day are sorted in reverse-chronological order     [place] Suicide Severity Rating Scale (Lifetime/Recent)      11/23/2020    12:00 PM 12/8/2020     9:00 AM 12/11/2020     8:00 AM 5/7/2021     1:00 PM 7/19/2021    11:30 AM 9/25/2024     9:55 AM 10/21/2024     3:01 PM   [place] Suicide Severity Rating (Lifetime/Recent)   Q1 Wish to be Dead (Lifetime) Yes Yes Yes Yes      Comments  yes yes --      Q2 Non-Specific Active Suicidal Thoughts (Lifetime) No   No      Q1 Wished to be Dead (Past Month)     yes 0-->no    Q2 Suicidal Thoughts (Past Month)      no 0-->no    Q3 Suicidal Thought Method     no     Q4 Suicidal Intent without Specific Plan     no     Q5 Suicide Intent with Specific Plan     no     Q6 Suicide Behavior (Lifetime)     no 0-->no    Level of Risk per Screen     low risk no risks indicated    RETIRED: 1. Wish to be Dead (Recent) Yes No No Yes      RETIRED: Wish to be Dead Description (Recent)    --      Comments    SI no plan or intent      RETIRED: 2. Non-Specific Active Suicidal Thoughts (Recent) No   No      3. Active Suicidal Ideation with any Methods (Not Plan) Without Intent to Act (Lifetime) No   No      RETIRED: 3. Active Suicidal Ideation with any Methods (Not Plan) Without Intent to Act (Recent) No   No      RETIRED: Active Suicidal Ideation with any Methods (Not Plan) Description (Recent)    n      RETIRE: 4. Active Suicidal Ideation with Some Intent to Act, Without Specific Plan (Lifetime) No   No      4. Active Suicidal Ideation with Some Intent to Act, Without Specific Plan (Recent) No   No      Active Suicidal Ideation with Some Intent to Act, Without Specific Plan Description (Recent)    n      RETIRE: 5. Active Suicidal Ideation with Specific Plan and Intent (Lifetime) No   No      RETIRED: 5. Active Suicidal Ideation with Specific Plan and Intent (Recent) No   No      RETIRED: Active Suicidal Ideation with Specific Plan and Intent Description (Recent)    n      Most Severe Ideation Rating (Past Month) 1   1      Frequency (Past Month) 3   3      Duration (Past Month) 1   1      Controllability (Past Month) 1   1      Protective Factors (Past Month) 1   1      Reasons for Ideation (Past Month) 5   5      Actual Attempt (Lifetime) No   No      Actual Attempt (Past 3 Months) No   No      Has subject engaged in non-suicidal self-injurious behavior? (Lifetime) No   Yes      Has subject engaged in non-suicidal self-injurious behavior? (Past 3 Months) No   No      Interrupted Attempts (Lifetime) No   No      Interrupted Attempts (Past  3 Months) No   No      Aborted or Self-Interrupted Attempt (Lifetime) No   No      Aborted or Self-Interrupted Attempt (Past 3 Months) No   No      Preparatory Acts or Behavior (Lifetime) No   No      Preparatory Acts or Behavior (Past 3 Months) No   No      Q1 Wish to be Dead (Lifetime)       Y   Wish to be Dead Description (Lifetime)       reports SI began in 6th grade, more serious in late 1990's and early 2000's following termination from first job in 1999 had more serious/intense thoughts   1. Wish to be Dead (Past 1 Month)       N   Q2 Non-Specific Active Suicidal Thoughts (Lifetime)       Y   Non-Specific Active Suicidal Thought Description (Lifetime)       I've thought about killing myself in the past, or I wish I was dead, or retailiation 'to get back at my dad on his birthday'   2. Non-Specific Active Suicidal Thoughts (Past 1 Month)       N   3. Active Suicidal Ideation with any Methods (Not Plan) Without Intent to Act (Lifetime)       Y   Active Suicidal Ideation with any Methods (Not Plan) Description (Lifetime)       overdose, gun - no ready access to guns   Q3 Active Suicidal Ideation with any Methods (Not Plan) Without Intent to Act (Past 1 Month)       N   Q4 Active Suicidal Ideation with Some Intent to Act, Without Specific Plan (Lifetime)       Y   Active Suicidal Ideation with Some Intent to Act, Without Specific Plan Description (Lifetime)       Car [place] [address] into [facility] - 2009   4. Active Suicidal Ideation with Some Intent to Act, Without Specific Plan (Past 1 Month)       N   Q5 Active Suicidal Ideation with Specific Plan and Intent (Lifetime)       N   Most Severe Ideation Rating (Lifetime)       3   Description of Most Severe Ideation (Lifetime)       idea of driving car into [place] on [address] in 2009   Most Severe Ideation Rating (Past 1 Month)       1   Description of Most Severe Ideation (Past 1 Month)       n/a   Frequency (Lifetime)       4   Frequency (Past 1 Month)        4   Duration (Lifetime)       2   Duration (Past 1 Month)       1   Controllability (Lifetime)       2   Controllability (Past 1 Month)       2   Deterrents (Lifetime)       3   Deterrents (Past 1 Month)       2   Reasons for Ideation (Lifetime)       3   Reasons for Ideation (Past 1 Month)       0   Actual Attempt (Lifetime)       N   Has subject engaged in non-suicidal self-injurious behavior? (Lifetime)       Y   Has subject engaged in non-suicidal self-injurious behavior? (Past 3 Months)       N   Interrupted Attempts (Lifetime)       N   Aborted or Self-Interrupted Attempt (Lifetime)       N   Preparatory Acts or Behavior (Lifetime)       N   Calculated C-SSRS Risk Score (Lifetime/Recent)       Moderate Risk         ASSESSMENT: Current Emotional / Mental Status (status of significant symptoms):   Risk status (Self / Other harm or suicidal ideation)   Patient denies current fears or concerns for personal safety.   Patient denies current or recent suicidal ideation or behaviors.   Patient denies current or recent homicidal ideation or behaviors.   Patient denies current or recent self injurious behavior or ideation.   Patient denies other safety concerns.   Patient reports there has been no change in risk factors since their last session.     Patient reports there has been no change in protective factors since their last session.     A safety and risk management plan has been developed including: Patient consented to co-developed safety plan on 10/21/2024.  Safety and risk management plan was reviewed.   Patient agreed to use safety plan should any safety concerns arise.  A copy was made available to the patient.     Appearance:   Appropriate    Eye Contact:   Fair    Psychomotor Behavior: Normal    Attitude:   Cooperative    Orientation:   All   Speech    Rate / Production: Normal     Volume:  Normal    Mood:    Anxious  Normal   Affect:    Appropriate    Thought Content:  Clear    Thought Form:  Coherent   Logical    Insight:    Good      Medication Review:   No changes to current psychiatric medication(s)     Medication Compliance:   Yes     Changes in Health Issues:   Yes: sinus infection, No Psychological Distress     Chemical Use Review:   Substance Use: Chemical use reviewed, no active concerns identified      Tobacco Use: No current tobacco use.      Diagnosis:  296.32 (F33.1) Major Depressive Disorder, Recurrent Episode, Moderate _.  300.02 (F41.1) Generalized Anxiety Disorder.    Collateral Reports Completed:   Not Applicable    PLAN: (Patient Tasks / Therapist Tasks / Other)  Identity automatic thinking that takes place in social situations (how people perceive patient). Identity coping skills for anxiety that don't involve avoidance of the uncomfortable thoughts/feelings.         Shirley Bartlett PsyD LP                                                         ______________________________________________________________________    Individual Treatment Plan    Patient's Name: Joel Pineda  YOB: 1975    Date of Creation: October 30, 2024   Date Treatment Plan Last Reviewed/Revised: October 30, 2024     DSM5 Diagnoses: 296.32 (F33.1) Major Depressive Disorder, Recurrent Episode, Moderate _ or 300.02 (F41.1) Generalized Anxiety Disorder  Psychosocial / Contextual Factors: recent surgery and resulting acute pain, chronic pain, chronic mental health concerns, mobility limitations  PROMIS (reviewed every 90 days):     Referral / Collaboration:  Referral to another professional/service is not indicated at this time..    Anticipated number of session for this episode of care: 9-12 sessions  Anticipation frequency of session: Weekly  Anticipated Duration of each session: 53 or more minutes  Treatment plan will be reviewed in 90 days or when goals have been changed.       MeasurableTreatment Goal(s) related to diagnosis / functional impairment(s)  Goal 1: Patient will decrease anxiety by 75% as  evidenced by YUDI-7    I will know I've met my goal when I am at peace with where I'm at.       Objective #A (Patient Action)                          Patient will identify the situations / thoughts that contribute to feeling anxious.  Status: New - Date: 10/30/2024       Intervention(s)  Therapist will process anxiety-proving emotions.     Objective #B  Patient will use at least 3 coping skills for anxiety management in the next 4 weeks.  Status: New - Date: 10/30/2024       Intervention(s)  Therapist will assign homework : coping skills practice to decrease anxiety .     Objective #C  Patient will use cognitive strategies identified in therapy to challenge anxious thoughts.  Status: New - Date: 10/30/2024       Intervention(s)  Therapist will  teach the patient ways to reframe negative core beliefs that contribute to anxiety.     Goal 2 Patient will report pain levels are consistently rated at 2/10 per patient report.   I will know I've met my goal when my pain is less disruptive.      Objective #A (Patient Action)    Patient will identify 5 strategies for managing pain.  Status: New - Date: 10/30/2024      Intervention(s)  Therapist will teach  strategies to manage pain and assign homework to use 3 strategies daily .    Objective #B  Patient will  engage in physical activity and PT exercises 2-3 times daily .  Status: New - Date: 10/30/2024      Intervention(s)  Therapist will assign homework to engage in at minimum one PT or physical activity daily .    Objective #C  Patient will Identify negative self-talk and behaviors: challenge core beliefs, myths, and actions.  Status: New - Date: 10/30/2024      Intervention(s)  Therapist will teach non-judgmental stance and help patient reframe negative self-talk  .      Goal 3: Patient will report reduction in depressive symptoms as evidenced by PHQ-9 score reduction of 75% or greater.     I will know I've met my goal when I feel like I have purpose in life.       Objective #A (Patient Action)    Status: New - Date: 10/20/2024      Patient will Decrease frequency and intensity of feeling down, depressed, hopeless.    Intervention(s)  Therapist will teach emotional recognition/identification. Therapist will reinforce use of emotion regulation skills .    Objective #B  Patient will use healthy communication when describing his emotions or when setting boundaries with others.     Status: New - Date: 10/30/2024      Intervention(s)  Therapist will teach Therapist will teach emotional regulation skills and interpersonal effectiveness skills to improve quality of communication.     Objective #C  Patient will track and record at least 3 pleasant exchanges with family members such as mother, father .  Status: New - Date: 10/30/2024      Intervention(s)  Therapist will teach about healthy boundaries. Therapist will encourage patient to focus on positive interactions with family and use cope ahead to increase likelihood of pleasant experience as an outcome .          Patient has reviewed and agreed to the above plan.      Shirley Bartlett PsyD LP  October 30, 2024

## 2024-10-31 ENCOUNTER — OFFICE VISIT (OUTPATIENT)
Dept: PALLIATIVE MEDICINE | Facility: OTHER | Age: 49
End: 2024-10-31
Attending: NURSE PRACTITIONER
Payer: MEDICARE

## 2024-10-31 VITALS
BODY MASS INDEX: 35.67 KG/M2 | OXYGEN SATURATION: 100 % | DIASTOLIC BLOOD PRESSURE: 90 MMHG | HEART RATE: 88 BPM | SYSTOLIC BLOOD PRESSURE: 132 MMHG | WEIGHT: 293 LBS

## 2024-10-31 DIAGNOSIS — M47.816 LUMBAR FACET ARTHROPATHY: ICD-10-CM

## 2024-10-31 DIAGNOSIS — M45.8 ANKYLOSING SPONDYLITIS OF SACRAL REGION (H): ICD-10-CM

## 2024-10-31 DIAGNOSIS — G62.9 PERIPHERAL POLYNEUROPATHY: ICD-10-CM

## 2024-10-31 DIAGNOSIS — G43.111 INTRACTABLE MIGRAINE WITH AURA WITH STATUS MIGRAINOSUS: ICD-10-CM

## 2024-10-31 DIAGNOSIS — G89.29 CHRONIC INTRACTABLE PAIN: Primary | ICD-10-CM

## 2024-10-31 DIAGNOSIS — Z86.19 HISTORY OF SHINGLES: ICD-10-CM

## 2024-10-31 LAB
ALBUMIN SERPL BCG-MCNC: 4.4 G/DL (ref 3.5–5.2)
ALP SERPL-CCNC: 81 U/L (ref 40–150)
ALT SERPL W P-5'-P-CCNC: 18 U/L (ref 0–70)
ANION GAP SERPL CALCULATED.3IONS-SCNC: 15 MMOL/L (ref 7–15)
AST SERPL W P-5'-P-CCNC: 22 U/L (ref 0–45)
BILIRUB SERPL-MCNC: 0.3 MG/DL
BUN SERPL-MCNC: 11.9 MG/DL (ref 6–20)
CALCIUM SERPL-MCNC: 9.8 MG/DL (ref 8.8–10.4)
CHLORIDE SERPL-SCNC: 100 MMOL/L (ref 98–107)
CREAT SERPL-MCNC: 1.11 MG/DL (ref 0.67–1.17)
CRP SERPL-MCNC: <3 MG/L
EGFRCR SERPLBLD CKD-EPI 2021: 80 ML/MIN/1.73M2
GLUCOSE SERPL-MCNC: 70 MG/DL (ref 70–99)
HCO3 SERPL-SCNC: 22 MMOL/L (ref 22–29)
POTASSIUM SERPL-SCNC: 4.6 MMOL/L (ref 3.4–5.3)
PROT SERPL-MCNC: 7.4 G/DL (ref 6.4–8.3)
SODIUM SERPL-SCNC: 137 MMOL/L (ref 135–145)

## 2024-10-31 PROCEDURE — G2211 COMPLEX E/M VISIT ADD ON: HCPCS | Performed by: NURSE PRACTITIONER

## 2024-10-31 PROCEDURE — 99214 OFFICE O/P EST MOD 30 MIN: CPT | Performed by: NURSE PRACTITIONER

## 2024-10-31 PROCEDURE — G0463 HOSPITAL OUTPT CLINIC VISIT: HCPCS | Performed by: NURSE PRACTITIONER

## 2024-10-31 RX ORDER — LIDOCAINE 50 MG/G
OINTMENT TOPICAL 2 TIMES DAILY PRN
Qty: 50 G | Refills: 5 | Status: SHIPPED | OUTPATIENT
Start: 2024-10-31

## 2024-10-31 RX ORDER — OXYCODONE HYDROCHLORIDE 5 MG/1
5 TABLET ORAL EVERY 6 HOURS PRN
Qty: 60 TABLET | Refills: 0 | Status: SHIPPED | OUTPATIENT
Start: 2024-10-31

## 2024-10-31 ASSESSMENT — PAIN SCALES - GENERAL: PAINLEVEL_OUTOF10: MILD PAIN (3)

## 2024-10-31 NOTE — PROGRESS NOTES
[facility]    CHIEF COMPLAINT: Chronic Pain.    INTERVAL HISTORY:  Last seen on 9/20/24.       Recommendations/plan at the last visit included:  Health Psychology: YES, Continue per your psychologist's recommendations.  Physical therapy: YES, You may or may not do PT after surgery.   30 minute Clinic follow-up with JOCELYN Zavala NP-C on 10/28/2024 at 8:00 am   Medication Management :   Continue current medications until surgery.   Stacia will send the Post Operative Pain Medication policy to Dr Biggs and give the inpatient pain management team a head's up.     Since last visit:   - 9/25/2024:  Bilateral lumbar 4-lumbar 5 hemilaminectomies, medial facetectomies and foraminotomies with left lumbar microdiscectomy on the left and lumbar 5-sacral 1 foraminotomies. Healing well post-operatively. Overall recovery has been good. Currently taking Oxycodone 5 mg up to 3 tabs per day.   - Injection Laryngoplasty in two weeks to avoid needing an implant for LLOYD    Pain Information today: Mild Pain (3)/10. Location of pain: low back pain.    Annual Requirements last collected:  4/8/24     Current Pain Relevant Medications:    Acetaminophen 500 mg BID & with Oxycodone.   Cymbalta 120 mg in AM  Lyrica 300 MG BID   Methocarbamol 500 mg 1-2 QID PRN  Nabumetone 500 mg 1-2 times a day  Lidocaine gel topically 2-3 times daily  Risperdal 0.5 mg 1-2 x daily      Pain Related Controlled Substances:   Clonazepam 0.5 mg, 2 tabs at HS.  Lunesta 3 mg  Oxycodone 5 mg 1-2 tabs per day PRN              Total opiate dose: 7.5-15 MME     Previous Pain Relevant Medications: (H--helped; HI--Helped initially; [facility]--Somewhat helpful; NH--No help; W--worse; SE--side effects; ?--Unsure if helpful)   NOTE: This medication information taken from patient's intake form, not medical records.   Opiates: Oxycodone: H, Tramadol:[facility]. SE, Codeine: NH  NSAIDS: Celebrex: SW, Nabumetone:H, Naproxen: SE  Anti-migraine  medications: Midrin? , Maxalt:H  Muscle Relaxants: Baclofen:[facility], Flexeril:H, Tizaidine:?, Methocarbamol:?  Neuropathics: Lyrica:H  Anti-depressants: Abilify:SE, Brintellix: NH, Wellbutrin: ?, Cymbalta: NH, Nortriptyline: NH, Seroquel:   [facility], Risperdal: NH, Effexor:?, Cymbalta ?  Anxiety medications: Xanax: H, Klonpin: H, lorazepam: H      Topicals: Lidocaine:H  Sleep medications:Belsomra: NH, Melatonin:H, Trazodone NH, Ambien:NH  Other medications not covered above: Humira: All, Enbrel; H, dicyclomine:H, Medrol:[facility], Prednisone:H     Any illicit drug use: denies   EtOH use: none   Caffeine use: 6-8 per day   Nicotine use: None     Past Pain Treatments:   Pain Clinic:   Yes  [facility] pain management: For spinal injections with Dr Diaz, [facility]: injections, nerve ablation, [facility] for SCS treatment.  Did trial but did not find it beneficial.   [facility] chronic pain program.    PT: Yes  For other reasons. Neck, back and feet  Psychologist: Yes ongoing with private therapist[facility]at  [facility].   Chiropractor: Yes years ag              Acupuncture: Yes NH  Pharmacotherapy:  Opioids: Yes  Non-opioids:    Yes   TENs Unit:Yes H for back   Injections: Yes Many for neck and back over the years.      Surgeries related to pain: Yes   October 2007 L5-S1 laminectomy, micro discectomy          THE 4 As OF OPIOID MAINTENANCE ANALGESIA    Analgesia: Is pain relief clinically significant? YES   Activity: Is patient functional and able to perform Activities of Daily Living? YES   Adverse effects: Is patient free from adverse side effects from opiates? YES   Adherence to Rx protocol: Is patient adhering to Controlled Substance Agreement and taking medications ONLY as ordered? YES     Is Narcan prescribed for opiate use >50 MME daily or concurrent use of opiates and benzodiazepines?  Yes    [place] Board of Pharmacy Data Base Reviewed:    YES; No concern for abuse or misuse of controlled medications based on this report. Reviewed [place]  [facility] October 30, 2024- no concerning fills.      PHYSICAL EXAM    Vitals:    10/31/24 1457   BP: (!) 132/90   Pulse: 88   SpO2: 100%   Weight: 132.9 kg (293 lb)       Constitutional: healthy, alert, and no distress A&O.   Patient is appropriate.  Psychiatric/mental status: Alert, without lethargy or stupor. Appropriate affect.     Neurologic exam:  CN:  Cranial nerves 2-12 are grossly normal.    MUSCULOSKELETAL: No physical exam completed due to post op status     Posture: Upright, shoulders and pelvis are leveled. No   Antalgic Gait Pattern?: Yes Variable    DIRE Score for ongoing opioid management is calculated as follows:    Diagnosis = 3 pts (advanced condition; severe pain/objective findings)    Intractability = 3 pts (patient fully engaged but inadequate response to treatments)    Risk        Psych = 3 pts (no significant personality dysfunction/mental illness; good communication with clinic)         Chem Hlth = 3 pts (no history of chemical dependency; not drug-focused)       Reliability = 2 pts (occasional difficulties with compliance; generally reliable)       Social = 2 pts (reduction in some relationships/life rolls)       (Psych + Chem hlth + Reliability + Social) = 16    Efficacy = 2 pts (moderate benefit/function; low med dose; too early/not tried meds)    DIRE Score = 18        7-13: likely NOT suitable candidate for long-term opioid analgesia       14-21: may be a suitable candidate for long-term opioid analgesia     Previous Diagnostic Tests:   Imaging Studies:      7/17/2024: MR LUMBAR SPINE W/O CONTRAST   IMPRESSION: Interval progression of multilevel lumbar spondylosis, most pronounced at L4-L5 and L5-S1, when compared to 6/27/2019. There is moderate to severe left neuroforaminal stenosis at L4-L5 with  abutment of the exiting left L4 nerve. Additional moderate to severe  bilateral neuroforaminal stenosis with abutment of the exiting  bilateral L5 nerves at L5-S1 and abutment of the descending  left S1  nerve root.         PAIN RELEVANT CONDITIONS:   1.  Ankylosing spondylosis, Lumbar DDD with left S1 nerve involvement, lumbar stenosis  2.  Chronic migraine headaches  3.  Chronic cervical pain, DDD  4.  Peripheral small fiber neuropathy       DIAGNOSIS AND PLAN:     (G89.29) Chronic intractable pain  (primary encounter diagnosis)  (M47.816) Lumbar facet arthropathy  (G43.111) Intractable migraine with aura with status migrainosus  (M45.8) Ankylosing spondylitis of sacral region (H)  (G62.9) Peripheral polyneuropathy  (Z86.19) History of shingles  Comment: Tito is recovering very well from recent lumbar surgery. Will continue previous chronic pain medications PRN and therapies.   Plan:   oxyCODONE (ROXICODONE) 5 MG tablet  lidocaine (XYLOCAINE) 5 % external ointment     Hypertension: stressful drive to clinic     PATIENT INSTRUCTIONS:     Diagnosis reviewed, treatment option addressed, and risk/benefits discussed.  Self-care instructions given.  I am recommending a multidisciplinary treatment plan to help this patient better manage pain.    Remember to request ALL medication refills 5 BUSINESS days before you run out.       Physical therapy: YES post op exercises as recommended by surgery   30 minutes Video or Clinic follow-up with JOCELYN Zavala, NPAIDAN on Friday 11/22/24 at 11:30 AM.   Medication Management :   Oxycodone 5 mg: Let Stacia know ASAP if there are any issues refilling Oxycodone 5 mg     I have reviewed the note as documented above.  This accurately captures the substance of my conversation with the patient.  A total of 18 minutes of preparation, care, and consultation were spent on this visit today.     JOCELYN Padgett, NP-C  [facility]    (Information in italics and blue color are taken from previous pain and consulting medical providers notes and are documented as such)

## 2024-10-31 NOTE — PROGRESS NOTES
Patient presents to the clinic today for a visit  with JOCELYN Ron CNP        UDS/CSA-4/8/24    Medications-oxycodone     QUESTIONS:    Sneha Nuñez MA  [facility] [place]

## 2024-10-31 NOTE — PATIENT INSTRUCTIONS
After Visit Instructions:     Thank you for coming to [facility] for your care. It is my goal to partner with you to help you reach your optimal state of health.   Continue daily self-care, identifying contributing factors, and monitoring variations in pain level. Continue to integrate self-care into your life.      Physical therapy: YES post op exercises as recommended by surgery   30 minutes Video or Clinic follow-up with JOCELYN Zavala NP-C on Friday 11/22/24 at 11:30 AM.   Medication Management :   Oxycodone 5 mg: Let Stacia know ASAP if there are any issues refilling Oxycodone 5 mg       JOCELYN Padgett, NP-C  [facility]  [facility] - Monday, Thursday and Friday  [facility] - Tuesday      Be sure to request ALL medication refills 5 days prior to the due date whether or not you will see your medical provider in an appointment before the due date.      Do not expect same day refills. If you do not plan ahead you may run out of medications.     Early refills are not provided.  It is your responsibility to manage your medications responsibility and keep them safely stored. Lost or destroyed medications WILL NOT be replaced    Scheduling/Clinic telephone Number for ALL locations:  [phone]    After Hours On-Call Service:  [phone]    Call with any questions about your care and for scheduling assistance.   Calls are returned Monday through Friday between 8 AM and 4:00 PM. We usually get back to you within 2 business days depending on the issue/request.    If we are prescribing your medications:  For opioid medication refills, call the clinic or send a [employer] message 7 days in advance.  Please include:  Your name and date of birth.   Name of requested medication  Name of the pharmacy.  For non-opioid medications, call your pharmacy directly to request a refill. Please allow 3-4 days to be processed.   Per [place] State Law:  All controlled substance  prescriptions must be filled within 30 days of being written.    For those controlled substances allowing refills, pickup must occur within 30 days of last fill.      We believe regular attendance is key to your success in our program!    Any time you are unable to keep your appointment we ask that you call us at least 24 hours in advance to cancel.This will allow us to offer the appointment time to another patient.   Multiple missed appointments may lead to dismissal from the clinic.

## 2024-11-04 NOTE — PROGRESS NOTES
Medication Therapy Management (MTM) Encounter    ASSESSMENT:                            Medication Adherence/Access: No issues reported    Sinus Infection:  Improving    Mood/Insomnia: Stable. Follows closely with psychiatry.    Migraine:  Stable    Pain:  Stable    Asthma   Has been using albuterol more than usual with sinus infection and coughing.     PLAN:                            No medication changes recommended today.  Medication list updated    Follow-up: 3 months    SUBJECTIVE/OBJECTIVE:                          Joel Pineda is a 49 year old male called for a follow up visit. He was referred to me from Ksenia Lyles. Follow up from 8/6/2024.    Reason for visit: Medication Review    Allergies/ADRs: Reviewed in chart  Past Medical History: Reviewed in chart  Tobacco: He reports that he has never smoked. He has never been exposed to tobacco smoke. He has never used smokeless tobacco.  Alcohol: none  Caffeine: 4-5 cups of coffee/day  Activity: None    Medication Adherence/Access: no issues reported  Patient uses pill box(es).  Patient qualifies for [facility] program for Enbrel (delivers to his house) and Ozempic (ships to [facility] endocrinology clinic)    Sinus Infection:  Omnicef 300mg twice daily for 5-7 days    Just started this 2 days ago. Was also treated with doxycycline but it was not effective. Cough improved with doxycycline but still has congestion.     Mood/Insomnia:  Duloxetine 120mg daily  Bupropion XL 300mg daily  Risperidone 0.5mg twice daily as needed-taking four times a week. Higher doses of risperdone are overly sedating  Clonazepam 1mg at bedtime for RBD-has been using 0.5mg during the day along with risperidone and 1mg at night. Risperdone alone has not been effective.  Lunesta 3mg at bedtime  Melatonin 10mg nightly as needed-hasn't had any issues since lowering his dose    Mood is ok. Motivation is still an issue and is working on this whith his therapist.  Follows  with Dr. NONI rodriguez  [facility]. Therapy through pain management.    Migraine:   Preventive medications  Botox every 3 months  Depakote 125mg daily   Magnesium Glycinate 400mg daily  Riboflavin 200mg daily     Acute medications  Ubrelvy    Used more Ubrelvy when he had COVID. Ubrelvy is effective.  If he uses higher dose of depakote, he has increased appetite and weight gain.  Has about 4 migraines a month.  [facility].    Pain:  Oxycodone 5mg can take up to two times daily as needed for pain-has been taking 1 tablet three times daily   Pregabalin 150mg three times daily-150mg in the morning and 150mg at night. Gets more brain fog and fatigue with higher doses.  Lidocaine 5% ointment twice daily as needed-neck and shoulders  Methocarbamol 500-1000mg four time daily-has been taking 1000mg twice daily to three times dailyl  Nabumetone 500-1000mg twice daily as needed -1000mg in the morning and one at night  Acetaminophen 500mg three times daily-has been taking along with oxycodone   Lidocaine 4% patch uses as needed-sparingly    Has been working with Pain Management (provider, therapist)  in [place] and feel this has been helpful. Acute pain is almost  resolved.  Had a bilateral 4-lumbar 5 hemilaminectomies, medial facetectomies and foraminotomies with lumbar microdisectomy on the left and lumbar 5-sacral 1 foramiooomies on 9/25/2024.  Denies constipation but has been having more upset stomach but likely due to antibiotics.    Asthma   Wixela 100-50mcg 1 puff twice daily   Albuterol 2 puffs every 6 hours as needed-using a couple of times a day with sinus infection and cough  Montelukast 10mg daily  Wixela will not be covered in 2025. Has reached out to pulmonology and will send in an alternative.   Patient rinses their mouth after using steroid inhaler.   Patient reports no current medication side effects.      Triggers include: smoke, upper respiratory infections, and strong odors and  fumes.  Patient reports the following symptoms: increased need of albuterol.    Is due for another laryngeo injection. Had one 3 months ago and improved his voice. [facility]      Today's Vitals: There were no vitals taken for this visit.  ----------------    I spent 25 minutes with this patient today. All changes were made via collaborative practice agreement with Ksenia Lyles. A copy of the visit note was provided to the patient's provider(s).    A summary of these recommendations was sent via [employer].    Radha Mcdonald, Pharm.D, [facility]  Medication Therapy Management Pharmacist      Telemedicine Visit Details  The patient's medications can be safely assessed via a telemedicine encounter.  Type of service:  Telephone visit  Originating Location (pt. Location): Home    Distant Location (provider location):  On-site  Start Time: 2:02 PM  End Time: 2:27 PM     Medication Therapy Recommendations  No medication therapy recommendations to display

## 2024-11-05 ENCOUNTER — VIRTUAL VISIT (OUTPATIENT)
Dept: PSYCHOLOGY | Facility: CLINIC | Age: 49
End: 2024-11-05
Payer: MEDICARE

## 2024-11-05 ENCOUNTER — VIRTUAL VISIT (OUTPATIENT)
Dept: PHARMACY | Facility: CLINIC | Age: 49
End: 2024-11-05
Payer: COMMERCIAL

## 2024-11-05 DIAGNOSIS — F33.1 MAJOR DEPRESSIVE DISORDER, RECURRENT EPISODE, MODERATE (H): Primary | ICD-10-CM

## 2024-11-05 DIAGNOSIS — G47.00 INSOMNIA, UNSPECIFIED TYPE: ICD-10-CM

## 2024-11-05 DIAGNOSIS — F41.1 GENERALIZED ANXIETY DISORDER: ICD-10-CM

## 2024-11-05 DIAGNOSIS — F41.8 DEPRESSION WITH ANXIETY: Primary | ICD-10-CM

## 2024-11-05 DIAGNOSIS — J45.30 MILD PERSISTENT ASTHMA WITHOUT COMPLICATION: ICD-10-CM

## 2024-11-05 DIAGNOSIS — G89.4 CHRONIC PAIN SYNDROME: ICD-10-CM

## 2024-11-05 DIAGNOSIS — G43.819 OTHER MIGRAINE WITHOUT STATUS MIGRAINOSUS, INTRACTABLE: ICD-10-CM

## 2024-11-05 DIAGNOSIS — J01.90 ACUTE SINUSITIS, RECURRENCE NOT SPECIFIED, UNSPECIFIED LOCATION: ICD-10-CM

## 2024-11-05 PROCEDURE — 90837 PSYTX W PT 60 MINUTES: CPT | Mod: 95 | Performed by: PSYCHOLOGIST

## 2024-11-05 PROCEDURE — 99207 PR NO CHARGE LOS: CPT | Mod: 93 | Performed by: PHARMACIST

## 2024-11-05 RX ORDER — CLONAZEPAM 0.5 MG/1
1 TABLET ORAL AT BEDTIME
COMMUNITY

## 2024-11-05 RX ORDER — GUAIFENESIN, PSEUDOEPHEDRINE HYDROCHLORIDE 600; 60 MG/1; MG/1
1 TABLET, EXTENDED RELEASE ORAL 2 TIMES DAILY PRN
COMMUNITY

## 2024-11-05 RX ORDER — CEFDINIR 300 MG/1
300 CAPSULE ORAL 2 TIMES DAILY
COMMUNITY

## 2024-11-05 NOTE — PROGRESS NOTES
[facility]                                     Progress Note    Patient Name: Joel Pineda  Date: November 5, 2024            Service Type: Individual      Session Start Time: 3:00 PM  Session End Time: 3:54 PM     Session Length: 54    Session #: 3    Attendees: Client attended alone    Service Modality:  Video Visit:      Provider verified identity through the following two step process.  Patient provided:  Patient photo and Patient is known previously to provider    Telemedicine Visit: The patient's condition can be safely assessed and treated via synchronous audio and visual telemedicine encounter.      Reason for Telemedicine Visit: Patient has requested telehealth visit    Originating Site (Patient Location): Patient's home    Distant Site (Provider Location): Provider Remote Setting- Home Office    Consent:  The patient/guardian has verbally consented to: the potential risks and benefits of telemedicine (video visit) versus in person care; bill my insurance or make self-payment for services provided; and responsibility for payment of non-covered services.     Patient would like the video invitation sent by:  Text to cell phone: [phone]    Mode of Communication:  Video Conference via [employer]well    Distant Location (Provider):  Off-site    As the provider I attest to compliance with applicable laws and regulations related to telemedicine.    DATA  Interactive Complexity: Yes, visit entailed Interactive Complexity evidenced by:Patient's presenting concerns require more intensive intervention than could be completed within the usual service. Provider used clinical judgment in determining the necessary length for the session to benefit the patient's needs.     Crisis: No        Progress Since Last Session (Related to Symptoms / Goals / Homework):   Symptoms: No change ongoing anxiety specifically identifies struggling with catastrophic thinking and worry thoughts about upcoming trip to  [place] for [place]    Homework: Achieved / completed to satisfaction - Patient continues to work on challenging negative self-talk.      Episode of Care Goals: Satisfactory progress - CONTEMPLATION (Considering change and yet undecided); Intervened by assessing the negative and positive thinking (ambivalence) about behavior change     Current / Ongoing Stressors and Concerns:   Worry about election results. Sister will be having cardiac work-up over Thanksgiving, 'on the fence' in terms of heading down to [place] as a result. Concern asking brother to pick you up from airport. Acknowledge engaging in catastrophic thinking related to asking brother for help or being perceived as a 'victim' due to mobility constraints from [place] if you do not have others to help you with transfer from airport. Explored grounding techniques to use in the moment, as well as with transportation to/from airport in [place]. Discussed coping ahead and DEAR MAN skills. Using Chat GPT to help 'massage' response to NONA.      Treatment Objective(s) Addressed in This Session:   use thought-stopping strategy daily to reduce intrusive thoughts  Decrease frequency and intensity of feeling down, depressed, hopeless  Identify negative self-talk and behaviors: challenge core beliefs, myths, and actions  identify 3 strategies for improving mood and to reduce anxiety       Intervention:   Interpersonal Therapy: nonjudgmental, positive regard   CBT: cognitive challenging, restructuring, reframing Behavioral activation techniques  PCT: supportive autonomy, unconditional positive regard, empathic reflection, active listening  Solution Focused: identified solvable problems and generated possible solutions    Assessments completed prior to visit:  The following assessments were completed by patient for this visit:  The following assessments were completed by patient for this visit:  PHQ9:       8/3/2023     7:16 PM 9/12/2023    10:10 AM 10/25/2023      6:30 PM 2/29/2024     9:50 AM 9/15/2024     8:14 AM 10/20/2024     2:17 PM 10/29/2024     4:30 PM   PHQ-9 SCORE   PHQ-9 Total Score MyChart 10 (Moderate depression) 12 (Moderate depression) 11 (Moderate depression) 16 (Moderately severe depression) 10 (Moderate depression) 7 (Mild depression) 14 (Moderate depression)   PHQ-9 Total Score 10 12 11 16 10 7 14        Patient-reported     GAD7:       12/26/2023    10:19 AM 3/19/2024     9:11 AM 4/6/2024    10:04 AM 4/22/2024    10:44 AM 7/18/2024     9:53 AM 8/1/2024     9:28 AM 10/20/2024     2:25 PM   YUDI-7 SCORE   Total Score 7 (mild anxiety) 10 (moderate anxiety) 9 (mild anxiety) 8 (mild anxiety) 11 (moderate anxiety) 7 (mild anxiety) 7 (mild anxiety)   Total Score 7 10 9 8 11 7 7     PROMIS 10-Global Health (only subscores and total score):       3/19/2024     9:13 AM 4/6/2024    10:08 AM 4/22/2024    10:45 AM 7/18/2024     9:55 AM 8/1/2024     9:29 AM 8/17/2024     1:12 PM 10/20/2024     2:19 PM   PROMIS-10 Scores Only   Global Mental Health Score 8 8 10 8 7 7    7 8   Global Physical Health Score 12 9 9 11 12 12    12 11   PROMIS TOTAL - SUBSCORES 20 17 19 19 19 19    19 19     [place] Suicide Severity Rating Scale (Short Version)      1/19/2022     1:00 PM 1/26/2022     1:00 PM 2/2/2022     1:00 PM 2/9/2022     1:00 PM 2/16/2022     1:00 PM 5/5/2023     2:41 PM 9/25/2024     9:55 AM   [place] Suicide Severity Rating (Short Version)   Over the past 2 weeks have you felt down, depressed, or hopeless? yes yes yes yes yes no    Over the past 2 weeks have you had thoughts of killing yourself? no no no no no no    Have you ever attempted to kill yourself? no no no no no no    Q1 Wished to be Dead (Past Month)       0-->no   Q2 Suicidal Thoughts (Past Month)       0-->no   Q6 Suicide Behavior (Lifetime)       0-->no   Level of Risk per Screen       no risks indicated            ASSESSMENT: Current Emotional / Mental Status (status of significant symptoms):   Risk  status (Self / Other harm or suicidal ideation)   Patient denies current fears or concerns for personal safety.   Patient denies current or recent suicidal ideation or behaviors.   Patient denies current or recent homicidal ideation or behaviors.   Patient denies current or recent self injurious behavior or ideation.   Patient denies other safety concerns.   Patient reports there has been no change in risk factors since their last session.     Patient reports there has been no change in protective factors since their last session.     A safety and risk management plan has been developed including: Patient consented to co-developed safety plan on 10/21/2024.  Safety and risk management plan was reviewed.   Patient agreed to use safety plan should any safety concerns arise.  A copy was made available to the patient.     Appearance:   Appropriate    Eye Contact:   Fair    Psychomotor Behavior: Normal    Attitude:   Cooperative    Orientation:   All   Speech    Rate / Production: Normal     Volume:  Normal    Mood:    Anxious  Normal   Affect:    Appropriate    Thought Content:  Clear    Thought Form:  Coherent  Logical    Insight:    Good      Medication Review:   No changes to current psychiatric medication(s)     Medication Compliance:   Yes     Changes in Health Issues:   Yes: ongoing sinus infection, No Psychological Distress     Chemical Use Review:   Substance Use: Chemical use reviewed, no active concerns identified      Tobacco Use: No current tobacco use.      Diagnosis:  296.32 (F33.1) Major Depressive Disorder, Recurrent Episode, Moderate _.  300.02 (F41.1) Generalized Anxiety Disorder.    Collateral Reports Completed:   Not Applicable    PLAN: (Patient Tasks / Therapist Tasks / Other)  Patient to review both DEAR MAN and cope ahead skill either using his DBT manual or searching online.    Patient to         Shirley Bartlett PsyD LP                                                          ______________________________________________________________________    Individual Treatment Plan    Patient's Name: Joel Pineda  YOB: 1975    Date of Creation: October 30, 2024   Date Treatment Plan Last Reviewed/Revised: October 30, 2024     DSM5 Diagnoses: 296.32 (F33.1) Major Depressive Disorder, Recurrent Episode, Moderate _ or 300.02 (F41.1) Generalized Anxiety Disorder  Psychosocial / Contextual Factors: recent surgery and resulting acute pain, chronic pain, chronic mental health concerns, mobility limitations  PROMIS (reviewed every 90 days):     Referral / Collaboration:  Referral to another professional/service is not indicated at this time..    Anticipated number of session for this episode of care: 9-12 sessions  Anticipation frequency of session: Weekly  Anticipated Duration of each session: 53 or more minutes  Treatment plan will be reviewed in 90 days or when goals have been changed.       MeasurableTreatment Goal(s) related to diagnosis / functional impairment(s)  Goal 1: Patient will decrease anxiety by 75% as evidenced by YUDI-7    I will know I've met my goal when I am at peace with where I'm at.       Objective #A (Patient Action)                          Patient will identify the situations / thoughts that contribute to feeling anxious.  Status: New - Date: 10/30/2024       Intervention(s)  Therapist will process anxiety-proving emotions.     Objective #B  Patient will use at least 3 coping skills for anxiety management in the next 4 weeks.  Status: New - Date: 10/30/2024       Intervention(s)  Therapist will assign homework : coping skills practice to decrease anxiety .     Objective #C  Patient will use cognitive strategies identified in therapy to challenge anxious thoughts.  Status: New - Date: 10/30/2024       Intervention(s)  Therapist will  teach the patient ways to reframe negative core beliefs that contribute to anxiety.     Goal 2 Patient will report pain levels  are consistently rated at 2/10 per patient report.   I will know I've met my goal when my pain is less disruptive.      Objective #A (Patient Action)    Patient will identify 5 strategies for managing pain.  Status: New - Date: 10/30/2024      Intervention(s)  Therapist will teach  strategies to manage pain and assign homework to use 3 strategies daily .    Objective #B  Patient will  engage in physical activity and PT exercises 2-3 times daily .  Status: New - Date: 10/30/2024      Intervention(s)  Therapist will assign homework to engage in at minimum one PT or physical activity daily .    Objective #C  Patient will Identify negative self-talk and behaviors: challenge core beliefs, myths, and actions.  Status: New - Date: 10/30/2024      Intervention(s)  Therapist will teach non-judgmental stance and help patient reframe negative self-talk  .      Goal 3: Patient will report reduction in depressive symptoms as evidenced by PHQ-9 score reduction of 75% or greater.     I will know I've met my goal when I feel like I have purpose in life.      Objective #A (Patient Action)    Status: New - Date: 10/20/2024      Patient will Decrease frequency and intensity of feeling down, depressed, hopeless.    Intervention(s)  Therapist will teach emotional recognition/identification. Therapist will reinforce use of emotion regulation skills .    Objective #B  Patient will use healthy communication when describing his emotions or when setting boundaries with others.     Status: New - Date: 10/30/2024      Intervention(s)  Therapist will teach Therapist will teach emotional regulation skills and interpersonal effectiveness skills to improve quality of communication.     Objective #C  Patient will track and record at least 3 pleasant exchanges with family members such as mother, father .  Status: New - Date: 10/30/2024      Intervention(s)  Therapist will teach about healthy boundaries. Therapist will encourage patient to focus on  positive interactions with family and use cope ahead to increase likelihood of pleasant experience as an outcome .    Patient has reviewed and agreed to the above plan.      Shirley Bartlett PsyD LP  October 30, 2024    room air

## 2024-11-05 NOTE — PATIENT INSTRUCTIONS
"Recommendations from today's MTM visit:                                                         No medication changes recommended today.    Follow-up: 3 months    It was great speaking with you today.  I value your experience and would be very thankful for your time in providing feedback in our clinic survey. In the next few days, you may receive an email or text message from [facility] with a link to a survey related to your  clinical pharmacist.\"     To schedule another MTM appointment, please call the clinic directly or you may call the MTM scheduling line at [phone] or toll-free at [phone].     My Clinical Pharmacist's contact information:                                                      Please feel free to contact me with any questions or concerns you have.      Radha Mcdonald, Pharm.D, [facility]  Medication Therapy Management Pharmacist      "

## 2024-11-07 ENCOUNTER — OFFICE VISIT (OUTPATIENT)
Dept: NEUROSURGERY | Facility: CLINIC | Age: 49
End: 2024-11-07
Payer: MEDICARE

## 2024-11-07 VITALS
OXYGEN SATURATION: 98 % | WEIGHT: 292 LBS | HEIGHT: 76 IN | SYSTOLIC BLOOD PRESSURE: 112 MMHG | HEART RATE: 93 BPM | DIASTOLIC BLOOD PRESSURE: 70 MMHG | BODY MASS INDEX: 35.56 KG/M2

## 2024-11-07 DIAGNOSIS — M54.16 LUMBAR RADICULOPATHY: ICD-10-CM

## 2024-11-07 DIAGNOSIS — Z98.890 S/P LUMBAR LAMINECTOMY: Primary | ICD-10-CM

## 2024-11-07 ASSESSMENT — PAIN SCALES - GENERAL: PAINLEVEL_OUTOF10: MILD PAIN (3)

## 2024-11-07 NOTE — PROGRESS NOTES
"NEUROSURGERY CLINIC FOLLOW UP   Joel Pineda is a pleasant 49 year old male who presents to the clinic today for a follow-up evaluation on Bilateral lumbar 4-lumbar 5 hemilaminectomies, medial facetectomies and foraminotomies with left lumbar microdiscectomy on the left and lumbar by 5-sacral 1 foraminotomies by Dr. Biggs on 9/25/2024.      Presently patient states that the pain he had in his low back has dramatically decreased to a tolerable level. He continues to have numbness on the bottom of his right foot, but that doesn't concern him so much.     He wears an old LSO brace from prior lumbar surgeries when driving, and uses abdominal binder for POTS.         PHYSICAL EXAM:   /70   Pulse 93   Ht 1.93 m (6' 4\")   Wt 132.5 kg (292 lb)   SpO2 98%   BMI 35.54 kg/m         Exam:   Patient appears comfortable, conversational, and in no apparent distress.   Gait is non-antalgic.      Bilateral lower extremities 5/5 including the d/f, p/f and EHL.   Lumbar spine is  non-tender to palpation.     Incision: well healed without erythema, induration, or drainage          ASSESSMENT AND PLAN:      Joel Pineda is a 49 year old male who presents to the clinic for a follow-up on Bilateral lumbar 4-lumbar 5 hemilaminectomies, medial facetectomies and foraminotomies with left lumbar microdiscectomy on the left and lumbar 5-sacral 1 foraminotomies .     On exam, he is noted to have appropriate strength, sensation and range of motion.     He can gradually increase his activity and add 5 pounds per week until back to his normal weight restrictions. He has been provided referral to physical therapy to focus on lumbar mobility and core strength to [facility] in [facility]..     Will plan to follow up in 6 weeks for a 3 months postop appointment with Dr. Biggs due to his past history per patient preference.     He understands that should any symptoms arise to contact the office sooner.        Giovany Khan, VEE, APRN, " GLORIA  [facility]  Tel [phone]

## 2024-11-07 NOTE — NURSING NOTE
"Joel Pineda is a 49 year old male who presents for:  Chief Complaint   Patient presents with    Surgical Followup     Lumbar 6 week postop. Patient states recovery is going well. Pain level 3 today, numbness/tingling in right foot by ball of feet and toes.        Initial Vitals:  /70   Pulse 93   Ht 6' 4\" (1.93 m)   Wt 292 lb (132.5 kg)   SpO2 98%   BMI 35.54 kg/m   Estimated body mass index is 35.54 kg/m  as calculated from the following:    Height as of this encounter: 6' 4\" (1.93 m).    Weight as of this encounter: 292 lb (132.5 kg). Body surface area is 2.67 meters squared. BP completed using cuff size: large  Mild Pain (3)        Adalid Gutierrez    "

## 2024-11-07 NOTE — LETTER
"11/7/2024      Joel Pineda  [address]  [place] [address]      Dear Colleague,    Thank you for referring your patient, Joel Pineda, to the [facility]. Please see a copy of my visit note below.    NEUROSURGERY CLINIC FOLLOW UP   Joel Pineda is a pleasant 49 year old male who presents to the clinic today for a follow-up evaluation on Bilateral lumbar 4-lumbar 5 hemilaminectomies, medial facetectomies and foraminotomies with left lumbar microdiscectomy on the left and lumbar by 5-sacral 1 foraminotomies by Dr. Biggs on 9/25/2024.      Presently patient states that the pain he had in his low back has dramatically decreased to a tolerable level. He continues to have numbness on the bottom of his right foot, but that doesn't concern him so much.     He wears an old LSO brace from prior lumbar surgeries when driving, and uses abdominal binder for POTS.         PHYSICAL EXAM:   /70   Pulse 93   Ht 1.93 m (6' 4\")   Wt 132.5 kg (292 lb)   SpO2 98%   BMI 35.54 kg/m         Exam:   Patient appears comfortable, conversational, and in no apparent distress.   Gait is non-antalgic.      Bilateral lower extremities 5/5 including the d/f, p/f and EHL.   Lumbar spine is  non-tender to palpation.     Incision: well healed without erythema, induration, or drainage          ASSESSMENT AND PLAN:      Joel Pineda is a 49 year old male who presents to the clinic for a follow-up on Bilateral lumbar 4-lumbar 5 hemilaminectomies, medial facetectomies and foraminotomies with left lumbar microdiscectomy on the left and lumbar 5-sacral 1 foraminotomies .     On exam, he is noted to have appropriate strength, sensation and range of motion.     He can gradually increase his activity and add 5 pounds per week until back to his normal weight restrictions. He has been provided referral to physical therapy to focus on lumbar mobility and core strength to [facility] in [place].. "     Will plan to follow up in 6 weeks for a 3 months postop appointment with Dr. Biggs due to his past history per patient preference.     He understands that should any symptoms arise to contact the office sooner.        Giovany Khan DNP, APRN, CNP  [facility] Neurosurgery  Tel [phone]          Again, thank you for allowing me to participate in the care of your patient.        Sincerely,        JOCELYN Sharif CNP

## 2024-11-11 DIAGNOSIS — J45.30 MILD PERSISTENT ASTHMA: ICD-10-CM

## 2024-11-12 RX ORDER — MONTELUKAST SODIUM 10 MG/1
TABLET ORAL
Qty: 90 TABLET | Refills: 3 | Status: SHIPPED | OUTPATIENT
Start: 2024-11-12

## 2024-11-12 NOTE — TELEPHONE ENCOUNTER
montelukast (SINGULAIR) 10 MG tablet TAKE ONE TABLET BY MOUTH EVERY EVENING 90 tablet 0 ordered 08/19/20     Last Office Visit: 06/10/2024  Future Office visit:  06/11/2025  Next 5 appointments (look out 90 days)      Feb 04, 2025 2:00 PM  Pharmacist Visit with Radha Mcdonald RPH  [facility] ([facility] ) [address] [place] [address] [phone]             Slime Szymanski LPN  Pulmonary Medicine:  [facility]  Phone: [phone] Fax: [phone]

## 2024-11-14 ENCOUNTER — VIRTUAL VISIT (OUTPATIENT)
Dept: PSYCHOLOGY | Facility: CLINIC | Age: 49
End: 2024-11-14
Payer: MEDICARE

## 2024-11-14 DIAGNOSIS — F33.1 MAJOR DEPRESSIVE DISORDER, RECURRENT EPISODE, MODERATE (H): Primary | ICD-10-CM

## 2024-11-14 DIAGNOSIS — F41.1 GENERALIZED ANXIETY DISORDER: ICD-10-CM

## 2024-11-14 NOTE — PROGRESS NOTES
[facility]                                     Progress Note    Patient Name: Joel Pineda  Date: November 14, 2024            Service Type: Individual      Session Start Time: 2:01 PM  Session End Time: 2:55 PM      Session Length: 54 minutes    Session #: 4    Attendees: Client attended alone    Service Modality:  Video Visit:      Provider verified identity through the following two step process.  Patient provided:  Patient photo and Patient is known previously to provider    Telemedicine Visit: The patient's condition can be safely assessed and treated via synchronous audio and visual telemedicine encounter.      Reason for Telemedicine Visit: Patient has requested telehealth visit    Originating Site (Patient Location): Patient's home    Distant Site (Provider Location): Provider Remote Setting- Home Office    Consent:  The patient/guardian has verbally consented to: the potential risks and benefits of telemedicine (video visit) versus in person care; bill my insurance or make self-payment for services provided; and responsibility for payment of non-covered services.     Patient would like the video invitation sent by:  Text to cell phone: [phone]    Mode of [facility]:  [facility]    Distant Location (Provider):  Off-site    As the provider I attest to compliance with applicable laws and regulations related to telemedicine.    DATA  Interactive Complexity: Yes, visit entailed Interactive Complexity evidenced by:Patient's presenting concerns require more intensive intervention than could be completed within the usual service. Provider used clinical judgment in determining the necessary length for the session to benefit the patient's needs.     Crisis: No        Progress Since Last Session (Related to Symptoms / Goals / Homework):   Symptoms: Worsening reports anxiety remains high primarily due to NONA issues, upcoming vocal cord surgery and potential for family  conflict while visiting [place]    Homework: Did not complete - Patient continues to work on challenging negative self-talk.      Episode of Care Goals: Satisfactory progress - CONTEMPLATION (Considering change and yet undecided); Intervened by assessing the negative and positive thinking (ambivalence) about behavior change     Current / Ongoing Stressors and Concerns:   Patient reports significant anxiety around vocal fold injections tomorrow. He shared his plan to use medication to manage anticipatory anxiety for this procedure. He was able to change his flight to arrive a few hours prior to parent's at same airport. Tito reports he had anxiety high enough that he had thought about calling [facility] to manage, he reports he ended up using skills and ultimately did not need to call. He endorses using ice packs, exercise (PT stretches and activities), medication to manage distress in the past week.        Treatment Objective(s) Addressed in This Session:   identify three distraction and diversion activities and use those activities to decrease level of anxiety    state understanding of stressors and relationship to physical symptoms  Decrease frequency and intensity of feeling down, depressed, hopeless  Identify negative self-talk and behaviors: challenge core beliefs, myths, and actions and to reduce anxiety       Intervention:      CBT: cognitive challenging, restructuring, reframing Behavioral activation techniques  PCT: supportive autonomy, unconditional positive regard, empathic reflection, active listening    Assessments completed prior to visit:  The following assessments were completed by patient for this visit:  The following assessments were completed by patient for this visit:  PHQ9:       8/3/2023     7:16 PM 9/12/2023    10:10 AM 10/25/2023     6:30 PM 2/29/2024     9:50 AM 9/15/2024     8:14 AM 10/20/2024     2:17 PM 10/29/2024     4:30 PM   PHQ-9 SCORE   PHQ-9 Total Score MyChart 10 (Moderate depression)  12 (Moderate depression) 11 (Moderate depression) 16 (Moderately severe depression) 10 (Moderate depression) 7 (Mild depression) 14 (Moderate depression)   PHQ-9 Total Score 10 12 11 16 10 7 14        Patient-reported     GAD7:       12/26/2023    10:19 AM 3/19/2024     9:11 AM 4/6/2024    10:04 AM 4/22/2024    10:44 AM 7/18/2024     9:53 AM 8/1/2024     9:28 AM 10/20/2024     2:25 PM   YUDI-7 SCORE   Total Score 7 (mild anxiety) 10 (moderate anxiety) 9 (mild anxiety) 8 (mild anxiety) 11 (moderate anxiety) 7 (mild anxiety) 7 (mild anxiety)   Total Score 7 10 9 8 11 7 7     PROMIS 10-Global Health (only subscores and total score):       4/6/2024    10:08 AM 4/22/2024    10:45 AM 7/18/2024     9:55 AM 8/1/2024     9:29 AM 8/17/2024     1:12 PM 10/20/2024     2:19 PM 11/6/2024    12:19 PM   PROMIS-10 Scores Only   Global Mental Health Score 8 10 8 7 7    7 8 7    Global Physical Health Score 9 9 11 12 12    12 11 11    PROMIS TOTAL - SUBSCORES 17 19 19 19 19    19 19 18        Patient-reported    Multiple values from one day are sorted in reverse-chronological order     [place] Suicide Severity Rating Scale (Short Version)      1/19/2022     1:00 PM 1/26/2022     1:00 PM 2/2/2022     1:00 PM 2/9/2022     1:00 PM 2/16/2022     1:00 PM 5/5/2023     2:41 PM 9/25/2024     9:55 AM   [place] Suicide Severity Rating (Short Version)   Over the past 2 weeks have you felt down, depressed, or hopeless? yes yes yes yes yes no    Over the past 2 weeks have you had thoughts of killing yourself? no no no no no no    Have you ever attempted to kill yourself? no no no no no no    Q1 Wished to be Dead (Past Month)       0-->no   Q2 Suicidal Thoughts (Past Month)       0-->no   Q6 Suicide Behavior (Lifetime)       0-->no   Level of Risk per Screen       no risks indicated            ASSESSMENT: Current Emotional / Mental Status (status of significant symptoms):   Risk status (Self / Other harm or suicidal ideation)   Patient denies  current fears or concerns for personal safety.   Patient denies current or recent suicidal ideation or behaviors.   Patient denies current or recent homicidal ideation or behaviors.   Patient denies current or recent self injurious behavior or ideation.   Patient denies other safety concerns.   Patient reports there has been no change in risk factors since their last session.     Patient reports there has been no change in protective factors since their last session.     A safety and risk management plan has been developed including: Patient consented to co-developed safety plan on 10/21/2024.  Safety and risk management plan was reviewed.   Patient agreed to use safety plan should any safety concerns arise.  A copy was made available to the patient.     Appearance:   Appropriate    Eye Contact:   Fair    Psychomotor Behavior: Normal    Attitude:   Cooperative    Orientation:   All   Speech    Rate / Production: Normal     Volume:  Normal    Mood:    Anxious  Normal   Affect:    Appropriate    Thought Content:  Clear    Thought Form:  Coherent  Logical    Insight:    Good      Medication Review:   No changes to current psychiatric medication(s)     Medication Compliance:   Yes     Changes in Health Issues:   Yes: ongoing sinus infection, No Psychological Distress     Chemical Use Review:   Substance Use: Chemical use reviewed, no active concerns identified      Tobacco Use: No current tobacco use.      Diagnosis:  296.32 (F33.1) Major Depressive Disorder, Recurrent Episode, Moderate _.  300.02 (F41.1) Generalized Anxiety Disorder.    Collateral Reports Completed:   Not Applicable    PLAN: (Patient Tasks / Therapist Tasks / Other)  Patient to review both SHOBHA MADERA and cope ahead skill either using his DBT manual or searching online.    Patient to use cope ahead for procedure tomorrow.    Patient to practice mindfulness while listening to music.    Shirley Bartlett PsyD LP                                                          ______________________________________________________________________    Individual Treatment Plan    Patient's Name: Joel Pineda  YOB: 1975    Date of Creation: October 30, 2024   Date Treatment Plan Last Reviewed/Revised: October 30, 2024     DSM5 Diagnoses: 296.32 (F33.1) Major Depressive Disorder, Recurrent Episode, Moderate _ or 300.02 (F41.1) Generalized Anxiety Disorder  Psychosocial / Contextual Factors: recent surgery and resulting acute pain, chronic pain, chronic mental health concerns, mobility limitations  PROMIS (reviewed every 90 days):     Referral / Collaboration:  Referral to another professional/service is not indicated at this time..    Anticipated number of session for this episode of care: 9-12 sessions  Anticipation frequency of session: Weekly  Anticipated Duration of each session: 53 or more minutes  Treatment plan will be reviewed in 90 days or when goals have been changed.       MeasurableTreatment Goal(s) related to diagnosis / functional impairment(s)  Goal 1: Patient will decrease anxiety by 75% as evidenced by YUDI-7    I will know I've met my goal when I am at peace with where I'm at.       Objective #A (Patient Action)                          Patient will identify the situations / thoughts that contribute to feeling anxious.  Status: New - Date: 10/30/2024       Intervention(s)  Therapist will process anxiety-proving emotions.     Objective #B  Patient will use at least 3 coping skills for anxiety management in the next 4 weeks.  Status: New - Date: 10/30/2024       Intervention(s)  Therapist will assign homework : coping skills practice to decrease anxiety .     Objective #C  Patient will use cognitive strategies identified in therapy to challenge anxious thoughts.  Status: New - Date: 10/30/2024       Intervention(s)  Therapist will  teach the patient ways to reframe negative core beliefs that contribute to anxiety.     Goal 2 Patient will report pain  levels are consistently rated at 2/10 per patient report.   I will know I've met my goal when my pain is less disruptive.      Objective #A (Patient Action)    Patient will identify 5 strategies for managing pain.  Status: New - Date: 10/30/2024      Intervention(s)  Therapist will teach  strategies to manage pain and assign homework to use 3 strategies daily .    Objective #B  Patient will  engage in physical activity and PT exercises 2-3 times daily .  Status: New - Date: 10/30/2024      Intervention(s)  Therapist will assign homework to engage in at minimum one PT or physical activity daily .    Objective #C  Patient will Identify negative self-talk and behaviors: challenge core beliefs, myths, and actions.  Status: New - Date: 10/30/2024      Intervention(s)  Therapist will teach non-judgmental stance and help patient reframe negative self-talk  .      Goal 3: Patient will report reduction in depressive symptoms as evidenced by PHQ-9 score reduction of 75% or greater.     I will know I've met my goal when I feel like I have purpose in life.      Objective #A (Patient Action)    Status: New - Date: 10/20/2024      Patient will Decrease frequency and intensity of feeling down, depressed, hopeless.    Intervention(s)  Therapist will teach emotional recognition/identification. Therapist will reinforce use of emotion regulation skills .    Objective #B  Patient will use healthy communication when describing his emotions or when setting boundaries with others.     Status: New - Date: 10/30/2024      Intervention(s)  Therapist will teach Therapist will teach emotional regulation skills and interpersonal effectiveness skills to improve quality of communication.     Objective #C  Patient will track and record at least 3 pleasant exchanges with family members such as mother, father .  Status: New - Date: 10/30/2024      Intervention(s)  Therapist will teach about healthy boundaries. Therapist will encourage patient to  focus on positive interactions with family and use cope ahead to increase likelihood of pleasant experience as an outcome .    Patient has reviewed and agreed to the above plan.      Shirley Bartlett PsyD LP  October 30, 2024

## 2024-11-20 ENCOUNTER — VIRTUAL VISIT (OUTPATIENT)
Dept: PSYCHOLOGY | Facility: CLINIC | Age: 49
End: 2024-11-20
Payer: MEDICARE

## 2024-11-20 ENCOUNTER — TELEPHONE (OUTPATIENT)
Dept: ENDOCRINOLOGY | Facility: CLINIC | Age: 49
End: 2024-11-20
Payer: MEDICARE

## 2024-11-20 DIAGNOSIS — F33.1 MAJOR DEPRESSIVE DISORDER, RECURRENT EPISODE, MODERATE (H): Primary | ICD-10-CM

## 2024-11-20 DIAGNOSIS — F41.1 GENERALIZED ANXIETY DISORDER: ICD-10-CM

## 2024-11-20 DIAGNOSIS — E11.8 TYPE 2 DIABETES MELLITUS WITH COMPLICATION, WITHOUT LONG-TERM CURRENT USE OF INSULIN (H): Primary | ICD-10-CM

## 2024-11-20 NOTE — PROGRESS NOTES
[facility]                                     Progress Note    Patient Name: Joel Pineda  Date: November 20, 2024            Service Type: Individual      Session Start Time: 4:00 PM Session End Time: 4:53 PM      Session Length: 53 minutes    Session #: 5    Attendees: Client attended alone    Service Modality:  Video Visit:      Provider verified identity through the following two step process.  Patient provided:  Patient photo and Patient is known previously to provider    Telemedicine Visit: The patient's condition can be safely assessed and treated via synchronous audio and visual telemedicine encounter.      Reason for Telemedicine Visit: Patient has requested telehealth visit    Originating Site (Patient Location): Patient's home    Distant Site (Provider Location): Provider Remote Setting- Home Office    Consent:  The patient/guardian has verbally consented to: the potential risks and benefits of telemedicine (video visit) versus in person care; bill my insurance or make self-payment for services provided; and responsibility for payment of non-covered services.     Patient would like the video invitation sent by:  Text to cell phone: [phone]    Mode [facility]:  [facility]    Distant Location (Provider):  Off-site    As the provider I attest to compliance with applicable laws and regulations related to telemedicine.    DATA  Interactive Complexity: Yes, visit entailed Interactive Complexity evidenced by:Patient's presenting concerns require more intensive intervention than could be completed within the usual service. Provider used clinical judgment in determining the necessary length for the session to benefit the patient's needs.     Crisis: No        Progress Since Last Session (Related to Symptoms / Goals / Homework):   Symptoms: Worsening Patient reports his anxiety continues to be high - he tracks this based on how often he has needed to take  Klonopin with Risperdal     Homework: Achieved / completed to satisfaction - Reviewed Thomas Seo and SHOBHA MADERA skills.      Episode of Care Goals: Satisfactory progress - CONTEMPLATION (Considering change and yet undecided); Intervened by assessing the negative and positive thinking (ambivalence) about behavior change     Current / Ongoing Stressors and Concerns:   Patient reports he was unable to tolerate the vocal fold injection due to pain, frustrated he wasn't offered any topical anaesthesia. Seeing a different ENT in April, hopeful she has a different technique. Processed strategies Tito will be using to manage distress while in [place]. Reviewed SHOBHA MADERA to explore ways to improve communication with co- who can respond in anger when she forget a meeting or wants to use funds in ways that are not outlined in NONA Quick. Patient shared about family of origin.      Treatment Objective(s) Addressed in This Session:   use at least 2-3 coping skills for anxiety management in the next 2 weeks  Identify negative self-talk and behaviors: challenge core beliefs, myths, and actions  practice one mindfulness skill each day for 15 minutes and to reduce anxiety and take breaks during family gathering next week       Intervention:      CBT: cognitive challenging, restructuring, reframing Behavioral activation techniques  PCT: supportive autonomy, unconditional positive regard, empathic reflection, active listening  DBT: SHOBHA MADERA, thomas seo, radical acceptance    Assessments completed prior to visit:  The following assessments were completed by patient for this visit:  The following assessments were completed by patient for this visit:  PHQ9:       8/3/2023     7:16 PM 9/12/2023    10:10 AM 10/25/2023     6:30 PM 2/29/2024     9:50 AM 9/15/2024     8:14 AM 10/20/2024     2:17 PM 10/29/2024     4:30 PM   PHQ-9 SCORE   PHQ-9 Total Score MyChart 10 (Moderate depression) 12 (Moderate depression) 11 (Moderate  depression) 16 (Moderately severe depression) 10 (Moderate depression) 7 (Mild depression) 14 (Moderate depression)   PHQ-9 Total Score 10 12 11 16 10 7 14        Patient-reported     GAD7:       12/26/2023    10:19 AM 3/19/2024     9:11 AM 4/6/2024    10:04 AM 4/22/2024    10:44 AM 7/18/2024     9:53 AM 8/1/2024     9:28 AM 10/20/2024     2:25 PM   YUDI-7 SCORE   Total Score 7 (mild anxiety) 10 (moderate anxiety) 9 (mild anxiety) 8 (mild anxiety) 11 (moderate anxiety) 7 (mild anxiety) 7 (mild anxiety)   Total Score 7 10 9 8 11 7 7     PROMIS 10-Global Health (only subscores and total score):       4/6/2024    10:08 AM 4/22/2024    10:45 AM 7/18/2024     9:55 AM 8/1/2024     9:29 AM 8/17/2024     1:12 PM 10/20/2024     2:19 PM 11/6/2024    12:19 PM   PROMIS-10 Scores Only   Global Mental Health Score 8 10 8 7 7    7 8 7    Global Physical Health Score 9 9 11 12 12    12 11 11    PROMIS TOTAL - SUBSCORES 17 19 19 19 19    19 19 18        Patient-reported    Multiple values from one day are sorted in reverse-chronological order     [place] Suicide Severity Rating Scale (Short Version)      1/19/2022     1:00 PM 1/26/2022     1:00 PM 2/2/2022     1:00 PM 2/9/2022     1:00 PM 2/16/2022     1:00 PM 5/5/2023     2:41 PM 9/25/2024     9:55 AM   [place] Suicide Severity Rating (Short Version)   Over the past 2 weeks have you felt down, depressed, or hopeless? yes yes yes yes yes no    Over the past 2 weeks have you had thoughts of killing yourself? no no no no no no    Have you ever attempted to kill yourself? no no no no no no    Q1 Wished to be Dead (Past Month)       0-->no   Q2 Suicidal Thoughts (Past Month)       0-->no   Q6 Suicide Behavior (Lifetime)       0-->no   Level of Risk per Screen       no risks indicated            ASSESSMENT: Current Emotional / Mental Status (status of significant symptoms):   Risk status (Self / Other harm or suicidal ideation)   Patient denies current fears or concerns for personal  safety.   Patient denies current or recent suicidal ideation or behaviors.   Patient denies current or recent homicidal ideation or behaviors.   Patient denies current or recent self injurious behavior or ideation.   Patient denies other safety concerns.   Patient reports there has been no change in risk factors since their last session.     Patient reports there has been no change in protective factors since their last session.     A safety and risk management plan has been developed including: Patient consented to co-developed safety plan on 10/21/2024.  Safety and risk management plan was reviewed.   Patient agreed to use safety plan should any safety concerns arise.  A copy was made available to the patient.     Appearance:   Appropriate    Eye Contact:   Fair    Psychomotor Behavior: Normal    Attitude:   Cooperative    Orientation:   All   Speech    Rate / Production: Normal     Volume:  Normal    Mood:    Anxious  Normal   Affect:    Appropriate    Thought Content:  Clear    Thought Form:  Coherent  Logical    Insight:    Good      Medication Review:   No changes to current psychiatric medication(s)     Medication Compliance:   Yes     Changes in Health Issues:   None reported     Chemical Use Review:   Substance Use: Chemical use reviewed, no active concerns identified      Tobacco Use: No current tobacco use.      Diagnosis:  296.32 (F33.1) Major Depressive Disorder, Recurrent Episode, Moderate _.  300.02 (F41.1) Generalized Anxiety Disorder.    Collateral Reports Completed:   Not Applicable    PLAN: (Patient Tasks / Therapist Tasks / Other)  Patient to use both DEAR [employer] and cope ahead skill as needed to improve communication with family and NONA co-.    Patient to practice mindfulness and self-care as often as needed while in [place].    Shirley Bartlett PsyD LP                                                          ______________________________________________________________________    Individual Treatment Plan    Patient's Name: Joel Pineda  YOB: 1975    Date of Creation: October 30, 2024   Date Treatment Plan Last Reviewed/Revised: October 30, 2024     DSM5 Diagnoses: 296.32 (F33.1) Major Depressive Disorder, Recurrent Episode, Moderate _ or 300.02 (F41.1) Generalized Anxiety Disorder  Psychosocial / Contextual Factors: recent surgery and resulting acute pain, chronic pain, chronic mental health concerns, mobility limitations  PROMIS (reviewed every 90 days):     Referral / Collaboration:  Referral to another professional/service is not indicated at this time..    Anticipated number of session for this episode of care: 9-12 sessions  Anticipation frequency of session: Weekly  Anticipated Duration of each session: 53 or more minutes  Treatment plan will be reviewed in 90 days or when goals have been changed.       MeasurableTreatment Goal(s) related to diagnosis / functional impairment(s)  Goal 1: Patient will decrease anxiety by 75% as evidenced by YUDI-7    I will know I've met my goal when I am at peace with where I'm at.       Objective #A (Patient Action)                          Patient will identify the situations / thoughts that contribute to feeling anxious.  Status: New - Date: 10/30/2024       Intervention(s)  Therapist will process anxiety-proving emotions.     Objective #B  Patient will use at least 3 coping skills for anxiety management in the next 4 weeks.  Status: New - Date: 10/30/2024       Intervention(s)  Therapist will assign homework : coping skills practice to decrease anxiety .     Objective #C  Patient will use cognitive strategies identified in therapy to challenge anxious thoughts.  Status: New - Date: 10/30/2024       Intervention(s)  Therapist will  teach the patient ways to reframe negative core beliefs that contribute to anxiety.     Goal 2 Patient will report pain levels  are consistently rated at 2/10 per patient report.   I will know I've met my goal when my pain is less disruptive.      Objective #A (Patient Action)    Patient will identify 5 strategies for managing pain.  Status: New - Date: 10/30/2024      Intervention(s)  Therapist will teach  strategies to manage pain and assign homework to use 3 strategies daily .    Objective #B  Patient will  engage in physical activity and PT exercises 2-3 times daily .  Status: New - Date: 10/30/2024      Intervention(s)  Therapist will assign homework to engage in at minimum one PT or physical activity daily .    Objective #C  Patient will Identify negative self-talk and behaviors: challenge core beliefs, myths, and actions.  Status: New - Date: 10/30/2024      Intervention(s)  Therapist will teach non-judgmental stance and help patient reframe negative self-talk  .      Goal 3: Patient will report reduction in depressive symptoms as evidenced by PHQ-9 score reduction of 75% or greater.     I will know I've met my goal when I feel like I have purpose in life.      Objective #A (Patient Action)    Status: New - Date: 10/20/2024      Patient will Decrease frequency and intensity of feeling down, depressed, hopeless.    Intervention(s)  Therapist will teach emotional recognition/identification. Therapist will reinforce use of emotion regulation skills .    Objective #B  Patient will use healthy communication when describing his emotions or when setting boundaries with others.     Status: New - Date: 10/30/2024      Intervention(s)  Therapist will teach Therapist will teach emotional regulation skills and interpersonal effectiveness skills to improve quality of communication.     Objective #C  Patient will track and record at least 3 pleasant exchanges with family members such as mother, father .  Status: New - Date: 10/30/2024      Intervention(s)  Therapist will teach about healthy boundaries. Therapist will encourage patient to focus on  positive interactions with family and use cope ahead to increase likelihood of pleasant experience as an outcome .    Patient has reviewed and agreed to the above plan.      Shirley Bartlett PsyD LP  October 30, 2024

## 2024-11-21 ENCOUNTER — MYC REFILL (OUTPATIENT)
Dept: PALLIATIVE MEDICINE | Facility: OTHER | Age: 49
End: 2024-11-21
Payer: MEDICARE

## 2024-11-21 DIAGNOSIS — G43.111 INTRACTABLE MIGRAINE WITH AURA WITH STATUS MIGRAINOSUS: ICD-10-CM

## 2024-11-21 DIAGNOSIS — M45.8 ANKYLOSING SPONDYLITIS OF SACRAL REGION (H): ICD-10-CM

## 2024-11-21 DIAGNOSIS — M47.816 LUMBAR FACET ARTHROPATHY: ICD-10-CM

## 2024-11-21 DIAGNOSIS — G89.29 CHRONIC INTRACTABLE PAIN: ICD-10-CM

## 2024-11-21 DIAGNOSIS — G62.9 PERIPHERAL POLYNEUROPATHY: ICD-10-CM

## 2024-11-21 RX ORDER — OXYCODONE HYDROCHLORIDE 5 MG/1
5 TABLET ORAL EVERY 6 HOURS PRN
Qty: 60 TABLET | Refills: 0 | Status: SHIPPED | OUTPATIENT
Start: 2024-11-21

## 2024-11-21 NOTE — TELEPHONE ENCOUNTER
Medication refill information reviewed.     Due date for oxyCODONE (ROXICODONE) 5 MG tablet is 12/02/24     Prescriptions prepped for review.     Will route to provider.

## 2024-11-21 NOTE — TELEPHONE ENCOUNTER
Received call from patient requesting refill(s) of Roxicodone    Last dispensed from pharmacy on: Nov. 1, 2024         Patient's last office/virtual visit by prescribing provider on: Oct. 31`, 2024   Next office/virtual appointment scheduled for: Nov. 22, 2024     UDT: Apr. 8, 2024  CSA: Apr. 9, 2024     E-prescribe to    [facility] [address] [place], [place] - [address] [address]    Will route to nursing [place] for review and preparation of prescription(s).

## 2024-11-21 NOTE — TELEPHONE ENCOUNTER
Referral pahhenry for GLP1 treatment      Abbie Cuenca MD  Staff Physician  Division of Endocrinology  [facility]  Pager #[phone]

## 2024-11-21 NOTE — TELEPHONE ENCOUNTER
Refills have been requested for the following medications:         oxyCODONE (ROXICODONE) 5 MG tablet [Stacia Jack]     Preferred pharmacy: [facility] [id number] [facility] [address] [address]

## 2024-11-25 ENCOUNTER — TELEPHONE (OUTPATIENT)
Dept: RHEUMATOLOGY | Facility: CLINIC | Age: 49
End: 2024-11-25

## 2024-11-25 DIAGNOSIS — M05.9 SEROPOSITIVE RHEUMATOID ARTHRITIS (H): Primary | ICD-10-CM

## 2024-11-25 NOTE — TELEPHONE ENCOUNTER
FUTURE VISIT INFORMATION      FUTURE VISIT INFORMATION:  Date: 1/24/25  Time: 3:30 PM  [place] [facility] [place]FORMATION:  Referring provider:    Referring providers clinic:    Reason for visit/diagnosis:  History of thyroplasty, failed injection recently at [facility] per patient     RECORDS REQUESTED FROM      Clinic name Comments Records Status Imaging Status   [facility]  8/26/24 mychart referral Epic    [facility] 1/26/21 MR cervical  1/25/21 XR cervical  7/23/20 XR sinus  7/23/20 XR chest Epic PACS   [facility] Procedure 6/2/21 PFT  9/15/20 [facility] ENT  MRN: [id number]  11/15/24 note- Evan Mari Jr., MD   CE    [facility] 5/30/24 LARYNGOSCOPY MICRO  CE    [facility] 12/19/18 note- Tana Maynard NP   CE    [facility] 6/15/20 CT neck  9/3/19 CT neck - pending CE PACS           11/25/2024 9:23 AM - Req image 2019 CT neck at [facility] to be pushed to LUIS MIGUEL Wyatt

## 2024-12-01 DIAGNOSIS — E53.8 B12 DEFICIENCY: ICD-10-CM

## 2024-12-01 DIAGNOSIS — M79.18 MYOFASCIAL MUSCLE PAIN: ICD-10-CM

## 2024-12-01 DIAGNOSIS — E78.5 HYPERLIPIDEMIA LDL GOAL <70: ICD-10-CM

## 2024-12-01 DIAGNOSIS — E78.1 HYPERTRIGLYCERIDEMIA: ICD-10-CM

## 2024-12-01 DIAGNOSIS — G89.29 CHRONIC INTRACTABLE PAIN: ICD-10-CM

## 2024-12-01 DIAGNOSIS — I10 ESSENTIAL HYPERTENSION WITH GOAL BLOOD PRESSURE LESS THAN 140/90: Primary | ICD-10-CM

## 2024-12-02 DIAGNOSIS — E53.8 B12 DEFICIENCY: ICD-10-CM

## 2024-12-02 RX ORDER — METOPROLOL SUCCINATE 200 MG/1
200 TABLET, EXTENDED RELEASE ORAL 2 TIMES DAILY
Qty: 180 TABLET | Refills: 0 | Status: SHIPPED | OUTPATIENT
Start: 2024-12-02

## 2024-12-02 RX ORDER — OMEGA-3-ACID ETHYL ESTERS 1 G/1
CAPSULE, LIQUID FILLED ORAL
Qty: 360 CAPSULE | Refills: 2 | Status: SHIPPED | OUTPATIENT
Start: 2024-12-02

## 2024-12-02 RX ORDER — CYANOCOBALAMIN 1000 UG/ML
INJECTION, SOLUTION INTRAMUSCULAR; SUBCUTANEOUS
Qty: 3 ML | Refills: 2 | Status: SHIPPED | OUTPATIENT
Start: 2024-12-02

## 2024-12-02 RX ORDER — NABUMETONE 500 MG/1
TABLET, FILM COATED ORAL
Qty: 120 TABLET | Refills: 0 | Status: SHIPPED | OUTPATIENT
Start: 2024-12-02

## 2024-12-02 RX ORDER — CYANOCOBALAMIN 1000 UG/ML
INJECTION, SOLUTION INTRAMUSCULAR; SUBCUTANEOUS
Qty: 7 ML | Refills: 0 | OUTPATIENT
Start: 2024-12-02

## 2024-12-03 ASSESSMENT — PAIN SCALES - PAIN ENJOYMENT GENERAL ACTIVITY SCALE (PEG)
AVG_PAIN_PASTWEEK: 4
INTERFERED_ENJOYMENT_LIFE: 5
PEG_TOTALSCORE: 4.33
INTERFERED_GENERAL_ACTIVITY: 4

## 2024-12-03 ASSESSMENT — ANXIETY QUESTIONNAIRES
1. FEELING NERVOUS, ANXIOUS, OR ON EDGE: MORE THAN HALF THE DAYS
GAD7 TOTAL SCORE: 9
8. IF YOU CHECKED OFF ANY PROBLEMS, HOW DIFFICULT HAVE THESE MADE IT FOR YOU TO DO YOUR WORK, TAKE CARE OF THINGS AT HOME, OR GET ALONG WITH OTHER PEOPLE?: VERY DIFFICULT
IF YOU CHECKED OFF ANY PROBLEMS ON THIS QUESTIONNAIRE, HOW DIFFICULT HAVE THESE PROBLEMS MADE IT FOR YOU TO DO YOUR WORK, TAKE CARE OF THINGS AT HOME, OR GET ALONG WITH OTHER PEOPLE: VERY DIFFICULT
2. NOT BEING ABLE TO STOP OR CONTROL WORRYING: MORE THAN HALF THE DAYS
7. FEELING AFRAID AS IF SOMETHING AWFUL MIGHT HAPPEN: SEVERAL DAYS
GAD7 TOTAL SCORE: 9
3. WORRYING TOO MUCH ABOUT DIFFERENT THINGS: MORE THAN HALF THE DAYS
7. FEELING AFRAID AS IF SOMETHING AWFUL MIGHT HAPPEN: SEVERAL DAYS
5. BEING SO RESTLESS THAT IT IS HARD TO SIT STILL: NOT AT ALL
6. BECOMING EASILY ANNOYED OR IRRITABLE: SEVERAL DAYS
4. TROUBLE RELAXING: SEVERAL DAYS
GAD7 TOTAL SCORE: 9

## 2024-12-04 ENCOUNTER — VIRTUAL VISIT (OUTPATIENT)
Dept: PSYCHOLOGY | Facility: CLINIC | Age: 49
End: 2024-12-04
Payer: MEDICARE

## 2024-12-04 DIAGNOSIS — F41.1 GENERALIZED ANXIETY DISORDER: ICD-10-CM

## 2024-12-04 DIAGNOSIS — F33.1 MAJOR DEPRESSIVE DISORDER, RECURRENT EPISODE, MODERATE (H): Primary | ICD-10-CM

## 2024-12-04 NOTE — PROGRESS NOTES
[facility]                                     Progress Note    Patient Name: Joel Pineda  Date: December 4, 2024         Service Type: Individual      Session Start Time: 2:00 PM Session End Time: 2:55 PM      Session Length: 55 minutes    Session #: 6    Attendees: Client attended alone    Service Modality:  Video Visit:      Provider verified identity through the following two step process.  Patient provided:  Patient photo and Patient is known previously to provider    Telemedicine Visit: The patient's condition can be safely assessed and treated via synchronous audio and visual telemedicine encounter.      Reason for Telemedicine Visit: Patient has requested telehealth visit    Originating Site (Patient Location): Patient's home    Distant Site (Provider Location): Provider Remote Setting- Home Office    Consent:  The patient/guardian has verbally consented to: the potential risks and benefits of telemedicine (video visit) versus in person care; bill my insurance or make self-payment for services provided; and responsibility for payment of non-covered services.     Patient would like the video invitation sent by:  Text to cell phone: [phone]    Mode of Communication:  [facility]    Distant Location (Provider):  Off-site    As the provider I attest to compliance with applicable laws and regulations related to telemedicine.    DATA  Interactive Complexity: Yes, visit entailed Interactive Complexity evidenced by:Patient's presenting concerns require more intensive intervention than could be completed within the usual service. Provider used clinical judgment in determining the necessary length for the session to benefit the patient's needs.     Crisis: No        Progress Since Last Session (Related to Symptoms / Goals / Homework):   Symptoms: No change Tito continues to report he has had heightened anxiety that has been unchanged since last visit.    Homework:  Achieved / completed to satisfaction - practice mindfulness and self-care as often as needed while in [place]      Episode of Care Goals: Satisfactory progress - CONTEMPLATION (Considering change and yet undecided); Intervened by assessing the negative and positive thinking (ambivalence) about behavior change     Current / Ongoing Stressors and Concerns:   Tito reports his family visit in [place] went well overall, however his mother was transported to hospital due to sepsis from diverticulitis, also has gallbladder stone. Step-father was intoxicated and being unkind to sister in law's family - led to some tension. He reports taking medications has been helpful. He has been avoiding emails from property manager - fearful about their responses to his 'luis alberto' emails regarding not paying certain association bills. Processed coping ahead for opening emails from property management company - opened them in session and processed as needed. [place] for [place] but brother. Pain has been high due to having to hold Embrel due to infections and injections - restarted earlier this week.        Treatment Objective(s) Addressed in This Session:   use at least 2-3 coping skills for anxiety management in the next 2 weeks  Identify negative self-talk and behaviors: challenge core beliefs, myths, and actions  practice one mindfulness skill each day for 15 minutes and to reduce anxiety and take breaks during family gathering next week       Intervention:      CBT: cognitive challenging, restructuring, reframing Behavioral activation techniques, grounding strategies  PCT: supportive autonomy, unconditional positive regard, empathic reflection, active listening  DBT: coping ahead, self-soothing with the 5 senses    Assessments completed prior to visit:  The following assessments were completed by patient for this visit:  The following assessments were completed by patient for this visit:  PHQ9:        9/12/2023    10:10 AM 10/25/2023     6:30 PM 2/29/2024     9:50 AM 9/15/2024     8:14 AM 10/20/2024     2:17 PM 10/29/2024     4:30 PM 12/3/2024    12:12 PM   PHQ-9 SCORE   PHQ-9 Total Score MyChart 12 (Moderate depression) 11 (Moderate depression) 16 (Moderately severe depression) 10 (Moderate depression) 7 (Mild depression) 14 (Moderate depression) 10 (Moderate depression)   PHQ-9 Total Score 12 11 16 10 7 14  10        Patient-reported     GAD7:       3/19/2024     9:11 AM 4/6/2024    10:04 AM 4/22/2024    10:44 AM 7/18/2024     9:53 AM 8/1/2024     9:28 AM 10/20/2024     2:25 PM 12/3/2024    12:14 PM   YUDI-7 SCORE   Total Score 10 (moderate anxiety) 9 (mild anxiety) 8 (mild anxiety) 11 (moderate anxiety) 7 (mild anxiety) 7 (mild anxiety) 9 (mild anxiety)   Total Score 10 9 8 11 7 7 9        Patient-reported     PROMIS 10-Global Health (only subscores and total score):       4/6/2024    10:08 AM 4/22/2024    10:45 AM 7/18/2024     9:55 AM 8/1/2024     9:29 AM 8/17/2024     1:12 PM 10/20/2024     2:19 PM 11/6/2024    12:19 PM   PROMIS-10 Scores Only   Global Mental Health Score 8 10 8 7 7    7 8 7    Global Physical Health Score 9 9 11 12 12    12 11 11    PROMIS TOTAL - SUBSCORES 17 19 19 19 19    19 19 18        Patient-reported    Multiple values from one day are sorted in reverse-chronological order     Eek Suicide Severity Rating Scale (Short Version)      1/19/2022     1:00 PM 1/26/2022     1:00 PM 2/2/2022     1:00 PM 2/9/2022     1:00 PM 2/16/2022     1:00 PM 5/5/2023     2:41 PM 9/25/2024     9:55 AM   Eek Suicide Severity Rating (Short Version)   Over the past 2 weeks have you felt down, depressed, or hopeless? yes yes yes yes yes no    Over the past 2 weeks have you had thoughts of killing yourself? no no no no no no    Have you ever attempted to kill yourself? no no no no no no    Q1 Wished to be Dead (Past Month)       0-->no   Q2 Suicidal Thoughts (Past Month)       0-->no   Q6 Suicide  Behavior (Lifetime)       0-->no   Level of Risk per Screen       no risks indicated            ASSESSMENT: Current Emotional / Mental Status (status of significant symptoms):   Risk status (Self / Other harm or suicidal ideation)   Patient denies current fears or concerns for personal safety.   Patient denies current or recent suicidal ideation or behaviors.   Patient denies current or recent homicidal ideation or behaviors.   Patient denies current or recent self injurious behavior or ideation.   Patient denies other safety concerns.   Patient reports there has been no change in risk factors since their last session.     Patient reports there has been no change in protective factors since their last session.     A safety and risk management plan has been developed including: Patient consented to co-developed safety plan on 10/21/2024.  Safety and risk management plan was reviewed.   Patient agreed to use safety plan should any safety concerns arise.  A copy was made available to the patient.     Appearance:   Appropriate    Eye Contact:   Fair    Psychomotor Behavior: Normal    Attitude:   Cooperative    Orientation:   All   Speech    Rate / Production: Normal     Volume:  Normal    Mood:    Anxious  Normal   Affect:    Appropriate    Thought Content:  Clear    Thought Form:  Coherent  Logical    Insight:    Good      Medication Review:   No changes to current psychiatric medication(s)     Medication Compliance:   Yes     Changes in Health Issues:   None reported     Chemical Use Review:   Substance Use: Chemical use reviewed, no active concerns identified      Tobacco Use: No current tobacco use.      Diagnosis:  296.32 (F33.1) Major Depressive Disorder, Recurrent Episode, Moderate _.  300.02 (F41.1) Generalized Anxiety Disorder.    Collateral Reports Completed:   Not Applicable    PLAN: (Patient Tasks / Therapist Tasks / Other)    Patient and provider will continue practicing how to reframe automatic thinking  from negative situations.     Provider and patient will collaborate on helpful coping skills and decrease avoidance of unwanted emotions.     Shirley Bartlett PsyD LP                                                         ______________________________________________________________________    Individual Treatment Plan    Patient's Name: Joel Pineda  YOB: 1975    Date of Creation: October 30, 2024   Date Treatment Plan Last Reviewed/Revised: October 30, 2024     DSM5 Diagnoses: 296.32 (F33.1) Major Depressive Disorder, Recurrent Episode, Moderate _ or 300.02 (F41.1) Generalized Anxiety Disorder  Psychosocial / Contextual Factors: recent surgery and resulting acute pain, chronic pain, chronic mental health concerns, mobility limitations  PROMIS (reviewed every 90 days):     Referral / Collaboration:  Referral to another professional/service is not indicated at this time..    Anticipated number of session for this episode of care: 9-12 sessions  Anticipation frequency of session: Weekly  Anticipated Duration of each session: 53 or more minutes  Treatment plan will be reviewed in 90 days or when goals have been changed.       MeasurableTreatment Goal(s) related to diagnosis / functional impairment(s)  Goal 1: Patient will decrease anxiety by 75% as evidenced by YUDI-7    I will know I've met my goal when I am at peace with where I'm at.       Objective #A (Patient Action)                          Patient will identify the situations / thoughts that contribute to feeling anxious.  Status: New - Date: 10/30/2024       Intervention(s)  Therapist will process anxiety-proving emotions.     Objective #B  Patient will use at least 3 coping skills for anxiety management in the next 4 weeks.  Status: New - Date: 10/30/2024       Intervention(s)  Therapist will assign homework : coping skills practice to decrease anxiety .     Objective #C  Patient will use cognitive strategies identified in therapy to challenge  anxious thoughts.  Status: New - Date: 10/30/2024       Intervention(s)  Therapist will  teach the patient ways to reframe negative core beliefs that contribute to anxiety.     Goal 2 Patient will report pain levels are consistently rated at 2/10 per patient report.   I will know I've met my goal when my pain is less disruptive.      Objective #A (Patient Action)    Patient will identify 5 strategies for managing pain.  Status: New - Date: 10/30/2024      Intervention(s)  Therapist will teach  strategies to manage pain and assign homework to use 3 strategies daily .    Objective #B  Patient will  engage in physical activity and PT exercises 2-3 times daily .  Status: New - Date: 10/30/2024      Intervention(s)  Therapist will assign homework to engage in at minimum one PT or physical activity daily .    Objective #C  Patient will Identify negative self-talk and behaviors: challenge core beliefs, myths, and actions.  Status: New - Date: 10/30/2024      Intervention(s)  Therapist will teach non-judgmental stance and help patient reframe negative self-talk  .      Goal 3: Patient will report reduction in depressive symptoms as evidenced by PHQ-9 score reduction of 75% or greater.     I will know I've met my goal when I feel like I have purpose in life.      Objective #A (Patient Action)    Status: New - Date: 10/20/2024      Patient will Decrease frequency and intensity of feeling down, depressed, hopeless.    Intervention(s)  Therapist will teach emotional recognition/identification. Therapist will reinforce use of emotion regulation skills .    Objective #B  Patient will use healthy communication when describing his emotions or when setting boundaries with others.     Status: New - Date: 10/30/2024      Intervention(s)  Therapist will teach Therapist will teach emotional regulation skills and interpersonal effectiveness skills to improve quality of communication.     Objective #C  Patient will track and record at  least 3 pleasant exchanges with family members such as mother, father .  Status: New - Date: 10/30/2024      Intervention(s)  Therapist will teach about healthy boundaries. Therapist will encourage patient to focus on positive interactions with family and use cope ahead to increase likelihood of pleasant experience as an outcome .    Patient has reviewed and agreed to the above plan.      Shirley Bartlett PsyD LP  October 30, 2024   Answers submitted by the patient for this visit:  Patient Health Questionnaire (Submitted on 12/3/2024)  If you checked off any problems, how difficult have these problems made it for you to do your work, take care of things at home, or get along with other people?: Very difficult  PHQ9 TOTAL SCORE: 10  Patient Health Questionnaire (G7) (Submitted on 12/3/2024)  YUDI 7 TOTAL SCORE: 9

## 2024-12-04 NOTE — TELEPHONE ENCOUNTER
PA Initiation    Test claim ran-Possible FPAP convert. Sent My Chart message to obtain income verification    Medication: ENBREL SURECLICK 50 MG/ML SC SOAJ  Insurance Company: [employer] - Phone [phone] Fax [phone]  Pharmacy Filling the Rx:    Filling Pharmacy Phone:    Filling Pharmacy Fax:    Start Date: 12/4/2024

## 2024-12-08 DIAGNOSIS — I10 ESSENTIAL HYPERTENSION WITH GOAL BLOOD PRESSURE LESS THAN 140/90: ICD-10-CM

## 2024-12-09 ENCOUNTER — OFFICE VISIT (OUTPATIENT)
Dept: PALLIATIVE MEDICINE | Facility: CLINIC | Age: 49
End: 2024-12-09
Attending: NURSE PRACTITIONER
Payer: MEDICARE

## 2024-12-09 VITALS — SYSTOLIC BLOOD PRESSURE: 119 MMHG | HEART RATE: 98 BPM | DIASTOLIC BLOOD PRESSURE: 75 MMHG

## 2024-12-09 DIAGNOSIS — G57.13 MERALGIA PARESTHETICA OF BOTH LOWER EXTREMITIES: ICD-10-CM

## 2024-12-09 PROCEDURE — 64450 NJX AA&/STRD OTHER PN/BRANCH: CPT | Mod: 50 | Performed by: PAIN MEDICINE

## 2024-12-09 RX ORDER — RAMIPRIL 10 MG/1
CAPSULE ORAL
Qty: 90 CAPSULE | Refills: 0 | Status: SHIPPED | OUTPATIENT
Start: 2024-12-09

## 2024-12-09 RX ORDER — BUPIVACAINE HYDROCHLORIDE 2.5 MG/ML
10 INJECTION, SOLUTION EPIDURAL; INFILTRATION; INTRACAUDAL ONCE
Status: COMPLETED | OUTPATIENT
Start: 2024-12-09 | End: 2024-12-09

## 2024-12-09 RX ADMIN — BUPIVACAINE HYDROCHLORIDE 25 MG: 2.5 INJECTION, SOLUTION EPIDURAL; INFILTRATION; INTRACAUDAL at 12:28

## 2024-12-09 ASSESSMENT — PAIN SCALES - GENERAL: PAINLEVEL_OUTOF10: MODERATE PAIN (4)

## 2024-12-09 NOTE — PROGRESS NOTES
Diagnoses and all orders for this visit:       Bilateral Meralgia paresthetica       Current Procedure: bilateral Lateral femoral cutaneous nerve block  Current Indication (include preoperative):  Alleviation of pain      REASON FOR REFERRAL: Anterior lateral thigh pain and burning  Sonographic guidance will be used to ensure accurate placement.  PATIENT EDUCATION:  Ready to learn with no apparent learning barriers identified.  Learning preferences include listening. Explained diagnosis and treatment plan as well as treatment alternatives. Patient expressed understanding of the content.  Following denial of allergy and review of potential side effects and complications including but not necessarily limited to infection, bleeding, allergic reaction, post-injection flare, local tissue breakdown, injury to soft tissue and/or nerves and seizure, patient indicated their understanding and agreed to proceed. A written consent was obtained and is scanned into the chart. Written and signed consent obtained and is scanned into the chart.  PROCEDURE:  Prior to the procedure,a 12 MHz linear transducer was used to visualize the abdominal/groin musculature to determine the approach for the procedure.  Procedure was carried out using sterile technique including Chloraprep scrub, a sterile transducer cover, and sterile transducer gel. A simple surgical tray was used.  PROCEDURAL PAUSE:  Procedural pause conducted to verify correct patient identity, procedure to be performed, and as applicable, correct side/site, correct patient position, availability of implants, special equipment, or special requirements.  Patient position:  Supine  Transducer type:  12 MHz linear array transducer  Approach:  Medial to lateral parallel to long axis of transducer  Local Anesthesia:  25 gauge 2.5 inch needle was used to anesthetize the skin, subcutaneous tissue  with 5 ml of 1% Lidocaine  Injection: After confirming needle tip position, syringe was  replaced with one containing 1 ml of 0.25% Bupivacaine which was injected and seen hydodissecting the sartorius and tensor fascia florida bilaterally.  Needle was removed bandage placed over the wound.  AFTERCARE:  Patient tolerated the procedure without complication. After a short observation period, the patient was discharged under their own power and in excellent condition.  Pain noted to be a 8/10 before completion of the procedure and 0/10 after completion of the procedure.      Injection solution contained:  2ml of 0.25% bupivacaine       I saw and examined the patient with the Pain Fellow/Resident. I have reviewed and agree with the resident's note and plan of care and made changes and corrections directly to the body of the note.   TIME SPENT:     BY MYSELF AND FELLOW/RESIDENT TOGETHER with fellow the entire procedure.      Elisabeth(fellow)MD Bubba Montgomery MD  [facility]

## 2024-12-09 NOTE — PATIENT INSTRUCTIONS
----------------------------------------------------------------  [facility] Number:  [phone]   Call with any questions about your care and for scheduling assistance.   Calls are returned Monday through Friday between 8 AM and 4:30 PM. We usually get back to you within 2 business days depending on the issue/request.    If we are prescribing your medications:  For opioid medication refills, call the clinic or send a [employer] message 7 days in advance.  Please include:  Name of requested medication  Name of the pharmacy.  For non-opioid medications, call your pharmacy directly to request a refill. Please allow 3-4 days to be processed.   Per [place] State Law:  All controlled substance prescriptions must be filled within 30 days of being written.    For those controlled substances allowing refills, pickup must occur within 30 days of last fill.      We believe regular attendance is key to your success in our program!    Any time you are unable to keep your appointment we ask that you call us at least 24 hours in advance to cancel.This will allow us to offer the appointment time to another patient.   Multiple missed appointments may lead to dismissal from the clinic.

## 2024-12-10 ENCOUNTER — VIRTUAL VISIT (OUTPATIENT)
Dept: PHARMACY | Facility: CLINIC | Age: 49
End: 2024-12-10
Attending: INTERNAL MEDICINE
Payer: MEDICARE

## 2024-12-10 DIAGNOSIS — E11.9 TYPE 2 DIABETES MELLITUS WITHOUT COMPLICATION, WITHOUT LONG-TERM CURRENT USE OF INSULIN (H): Primary | ICD-10-CM

## 2024-12-10 RX ORDER — KETOTIFEN FUMARATE 0.35 MG/ML
1 SOLUTION/ DROPS OPHTHALMIC DAILY PRN
COMMUNITY

## 2024-12-10 NOTE — PATIENT INSTRUCTIONS
"Recommendations from today's MTM visit:                                                      Stop Ozempic until the new year, at that time we will consider changing to Mounjaro as we suspect it will be more affordable with new Medicare co-pay system. I will verify our plan with Dr. Cuenca and send you a my chart message with any updates.     Follow-up: 1/2/2025 at 11AM     Endocrine Team & Next Follow-Up:  1/2/25 with Traci  7/7/25 with Dr. Cuenca    It was great speaking with you today.  I value your experience and would be very thankful for your time in providing feedback in our clinic survey. In the next few days, you may receive an email or text message from [facility] with a link to a survey related to your  clinical pharmacist.\"     To schedule another MTM appointment, please call the clinic directly or you may call the MTM scheduling line at [phone].    My Clinical Pharmacist's contact information:                                                      Please feel free to contact me with any questions or concerns you have.      Traci Méndez), PharmD  Endocrine & Diabetes Medication Therapy Management Pharmacist   [address] [place] [id number]    "

## 2024-12-10 NOTE — PROGRESS NOTES
Medication Therapy Management (MTM) Encounter    ASSESSMENT:                            Medication Adherence/Access: See below for considerations.    Diabetes: Given patient's BMI and reaction to Ozempic therapy, agree with plan of considering change from Ozempic to Mounjaro therapy however will need to wait until Mounjaro will be more affordable likely next month at the start of the year with the new Medicare co-pay system since there is no PAP for Mounjaro at this time. Since patient's blood sugars have generally been controlled based on recent A1c trends, recommend patient stop Ozempic therapy due to reaction/side effect that they are having and will re-start Mounjaro therapy at start of year or Ozempic therapy at lower dose if Mounjaro is not affordable, will verify plan with Dr. Cuenca.     Due to time constraints, I was only able to assess the above with the patient today. Will follow-up on other disease states in future encounters. Patient recently had medications reviewed by MTM pharmacist in November and the patient's medication list has been updated accordingly.     PLAN:                            Stop Ozempic until the new year, at that time we will consider changing to Mounjaro as we suspect it will be more affordable with new Medicare co-pay system. I will verify our plan with Dr. Cuenca and send you a my chart message with any updates.     Follow-up: 1/2/2025 at 11AM     Endocrine Team & Next Follow-Up:  1/2/25 with Traci  7/7/25 with Dr. Cuenca    SUBJECTIVE/OBJECTIVE:                          Tito Pineda is a 49 year old male seen via secure video for an initial visit. He was referred to me from Dr. Cuenca.    Reason for visit: Ozempic follow up.    Allergies/ADRs: Reviewed in chart  Past Medical History: Reviewed in chart  Tobacco: He reports that he has never smoked. He has never been exposed to tobacco smoke. He has never used smokeless tobacco.  Alcohol: Reviewed in chart     Medication  "Adherence/Access: Patient is currently enrolled in [employer] patient assistance program for Ozempic, he notes that he is already enrolled in his insurance spend down program for 2025 to help with medication costs. He notes that he currently has about a 3 month supply remaining of Ozempic at the 1mg dose.     Diabetes   Patient diagnosed with type 2 diabetes   Current Medications:  Ozempic 1mg weekly on Fridays- patient notes that he is potentially interested in changing to Mounjaro for more weight loss and based on the recent side effect they are having with Ozempic. Patient reports that he is starting to have an allergic like sensitivity to Ozempic, he notes that the sensitivity has been a new side effect that he has noticed with Ozempic. He reports that the sensitivity started occurring when he re-started Ozempic at the end of October at the 1mg dose after he had to stop therapy prior to surgeries. He notes that he was off of the Ozempic for a couple weeks starting at the end August 29th and re-starting October 4th at 1mg dose. Reports that symptoms usually occur on the injection day right after the injection but taking benadryl 50mg daily helps to resolve the side effects. He notes that he has had some nausea symptoms on and off as well and his IBS condition makes it difficult to determine if constipation/diarrhea symptoms are related to Ozempic or IBS condition.   Blood sugar monitoring: never  Hemoglobin A1C   Date Value Ref Range Status   06/24/2024 5.5 0.0 - 5.6 % Final     Comment:     Normal <5.7%   Prediabetes 5.7-6.4%    Diabetes 6.5% or higher     Note: Adopted from ADA consensus guidelines.   02/10/2021 6.5 (H) 0 - 5.6 % Final     Comment:     Normal <5.7% Prediabetes 5.7-6.4%  Diabetes 6.5% or higher - adopted from ADA   consensus guidelines.       Estimated body mass index is 35.54 kg/m  as calculated from the following:    Height as of 11/7/24: 6' 4\" (1.93 m).    Weight as of 11/7/24: 292 lb (132.5 " kg).    Wt Readings from Last 3 Encounters:   11/07/24 292 lb (132.5 kg)   10/31/24 293 lb (132.9 kg)   09/25/24 283 lb (128.4 kg)      Today's Vitals: There were no vitals taken for this visit.  ----------------    I spent 38 minutes with this patient today. I offer these suggestions for consideration by Dr. Cuenca. A copy of the visit note was provided to the patient's provider(s).    A summary of these recommendations was sent via [employer].    Traci Méndez), PharmD  Endocrine & Diabetes Medication Therapy Management Pharmacist   [address] [place] [address]     Telemedicine Visit Details  The patient's medications can be safely assessed via a telemedicine encounter.  Type of service:  Telephone visit  Originating Location (pt. Location): Home    Distant Location (provider location):  Off-site  Start Time:  1:00 PM  End Time: 1:38 PM     Medication Therapy Recommendations  Type 2 diabetes mellitus with complication, without long-term current use of insulin (H)   1 Current Medication: Semaglutide, 1 MG/DOSE, (OZEMPIC) 4 MG/3ML pen   Current Medication Sig: Inject 1 mg Subcutaneous once a week   Rationale: Undesirable effect - Adverse medication event - Safety   Recommendation: Discontinue Medication   Status: Accepted per Provider   Identified Date: 12/10/2024 Completed Date: 12/11/2024

## 2024-12-10 NOTE — Clinical Note
Eric, I met with Tito today, sounds like he is having an allergic type reaction right after taking Ozempic for which he needs to take benadryl 50mg to help with the symptoms. Mounjaro is not affordable now but may be at the start of the year with Medicare changes. I was thinking of having him stop the Ozempic for now until I have follow up with him on 1/2 then we could consider changing to Mounjaro at that time. Would you agree with stopping the Ozempic for now so that we can assess if his allergic symptoms are related to Ozempic. I am not sure where sugars are at since last A1c was in June and he does not monitor.

## 2024-12-11 ENCOUNTER — VIRTUAL VISIT (OUTPATIENT)
Dept: PSYCHOLOGY | Facility: CLINIC | Age: 49
End: 2024-12-11
Payer: MEDICARE

## 2024-12-11 DIAGNOSIS — F33.1 MAJOR DEPRESSIVE DISORDER, RECURRENT EPISODE, MODERATE (H): Primary | ICD-10-CM

## 2024-12-11 DIAGNOSIS — F41.1 GENERALIZED ANXIETY DISORDER: ICD-10-CM

## 2024-12-11 NOTE — PROGRESS NOTES
[facility]                                     Progress Note    Patient Name: Joel Pineda  Date: December 11, 2024          Service Type: Individual      Session Start Time: 2:00 PM Session End Time: 2:34 PM      Session Length: 34 minutes    Session #: 7    Attendees: Client attended alone    Service Modality:  Video Visit:      Provider verified identity through the following two step process.  Patient provided:  Patient photo and Patient is known previously to provider    Telemedicine Visit: The patient's condition can be safely assessed and treated via synchronous audio and visual telemedicine encounter.      Reason for Telemedicine Visit: Patient has requested telehealth visit    Originating Site (Patient Location): Patient's home    Distant Site (Provider Location): Provider Remote Setting- Home Office    Consent:  The patient/guardian has verbally consented to: the potential risks and benefits of telemedicine (video visit) versus in person care; bill my insurance or make self-payment for services provided; and responsibility for payment of non-covered services.     Patient would like the video invitation sent by:  Text to cell phone: [phone]    Mode of Communication:  Video Conference via [employer]    Distant Location (Provider):  Off-site    As the provider I attest to compliance with applicable laws and regulations related to telemedicine.    DATA  Interactive Complexity: No  Crisis: No        Progress Since Last Session (Related to Symptoms / Goals / Homework):   Symptoms: Improving reports anxiety has mildly reduced.    Homework: Achieved / completed to satisfaction - engaged in social activity with friend.      Episode of Care Goals: Satisfactory progress - CONTEMPLATION (Considering change and yet undecided); Intervened by assessing the negative and positive thinking (ambivalence) about behavior change     Current / Ongoing Stressors and Concerns:   Tito reports actively  using skills to manage anxiety that arises from some of his duties for [employer] endorses increased brain fog lately which makes memory retrieval challenging. Also reports increased migraines, discussed how this coupled with higher pain and stress could be contributing to brain fog. Also has had to use Benedryl regularly to manage side effects from Ozempic, plans to transition to Mounjaro in the new year. Looking at volunteer opportunities, he would like to explore helping with tax preparation. Tito reports he is very fatigued and requested to end early to take a nap.       Treatment Objective(s) Addressed in This Session:   use relaxation strategies 3 times per day to reduce the physical symptoms of anxiety  state understanding of stressors and relationship to physical symptoms  Feel less tired and more energy during the day   Identify negative self-talk and behaviors: challenge core beliefs, myths, and actions  practice one mindfulness skill each day for 15 minutes      Intervention:      CBT: cognitive challenging, restructuring, reframing Behavioral activation techniques, grounding strategies  PCT: supportive autonomy, unconditional positive regard, empathic reflection, active listening  DBT: coping ahead, self-soothing with the 5 senses    Assessments completed prior to visit:  The following assessments were completed by patient for this visit:  The following assessments were completed by patient for this visit:  PHQ9:       10/25/2023     6:30 PM 2/29/2024     9:50 AM 9/15/2024     8:14 AM 10/20/2024     2:17 PM 10/29/2024     4:30 PM 12/3/2024    12:12 PM 12/10/2024     2:20 PM   PHQ-9 SCORE   PHQ-9 Total Score MyChart 11 (Moderate depression) 16 (Moderately severe depression) 10 (Moderate depression) 7 (Mild depression) 14 (Moderate depression) 10 (Moderate depression) 10 (Moderate depression)   PHQ-9 Total Score 11 16 10 7 14  10  10        Patient-reported     GAD7:       3/19/2024     9:11 AM 4/6/2024     10:04 AM 4/22/2024    10:44 AM 7/18/2024     9:53 AM 8/1/2024     9:28 AM 10/20/2024     2:25 PM 12/3/2024    12:14 PM   YUDI-7 SCORE   Total Score 10 (moderate anxiety) 9 (mild anxiety) 8 (mild anxiety) 11 (moderate anxiety) 7 (mild anxiety) 7 (mild anxiety) 9 (mild anxiety)   Total Score 10 9 8 11 7 7 9        Patient-reported     PROMIS 10-Global Health (only subscores and total score):       4/6/2024    10:08 AM 4/22/2024    10:45 AM 7/18/2024     9:55 AM 8/1/2024     9:29 AM 8/17/2024     1:12 PM 10/20/2024     2:19 PM 11/6/2024    12:19 PM   PROMIS-10 Scores Only   Global Mental Health Score 8 10 8 7 7    7 8 7    Global Physical Health Score 9 9 11 12 12    12 11 11    PROMIS TOTAL - SUBSCORES 17 19 19 19 19    19 19 18        Patient-reported    Multiple values from one day are sorted in reverse-chronological order     [place] Suicide Severity Rating Scale (Short Version)      1/19/2022     1:00 PM 1/26/2022     1:00 PM 2/2/2022     1:00 PM 2/9/2022     1:00 PM 2/16/2022     1:00 PM 5/5/2023     2:41 PM 9/25/2024     9:55 AM   [place] Suicide Severity Rating (Short Version)   Over the past 2 weeks have you felt down, depressed, or hopeless? yes yes yes yes yes no    Over the past 2 weeks have you had thoughts of killing yourself? no no no no no no    Have you ever attempted to kill yourself? no no no no no no    Q1 Wished to be Dead (Past Month)       0-->no   Q2 Suicidal Thoughts (Past Month)       0-->no   Q6 Suicide Behavior (Lifetime)       0-->no   Level of Risk per Screen       no risks indicated            ASSESSMENT: Current Emotional / Mental Status (status of significant symptoms):   Risk status (Self / Other harm or suicidal ideation)   Patient denies current fears or concerns for personal safety.   Patient denies current or recent suicidal ideation or behaviors.   Patient denies current or recent homicidal ideation or behaviors.   Patient denies current or recent self injurious behavior or  ideation.   Patient denies other safety concerns.   Patient reports there has been no change in risk factors since their last session.     Patient reports there has been no change in protective factors since their last session.     A safety and risk management plan has been developed including: Patient consented to co-developed safety plan on 10/21/2024.  Safety and risk management plan was reviewed.   Patient agreed to use safety plan should any safety concerns arise.  A copy was made available to the patient.     Appearance:   Appropriate    Eye Contact:   Fair    Psychomotor Behavior: Normal    Attitude:   Cooperative    Orientation:   All   Speech    Rate / Production: Normal     Volume:  Normal    Mood:    Anxious  Normal   Affect:    Appropriate    Thought Content:  Clear    Thought Form:  Coherent  Logical    Insight:    Good      Medication Review:   No changes to current psychiatric medication(s)     Medication Compliance:   Yes     Changes in Health Issues:   None reported     Chemical Use Review:   Substance Use: Chemical use reviewed, no active concerns identified      Tobacco Use: No current tobacco use.      Diagnosis:  296.32 (F33.1) Major Depressive Disorder, Recurrent Episode, Moderate _.  300.02 (F41.1) Generalized Anxiety Disorder.    Collateral Reports Completed:   Not Applicable    PLAN: (Patient Tasks / Therapist Tasks / Other)    Patient and provider will collaborate on behavioral activation techniques to help the patient improve functioning.     Patient will practice cognitive reframing techniques for worry/rumination.     Patient to use ice/heat for 20 minutes 2-3 times daily each for SI joint pain.     Shirley Bartlett PsyD LP                                                         ______________________________________________________________________    Individual Treatment Plan    Patient's Name: Joel Pineda  YOB: 1975    Date of Creation: October 30, 2024   Date Treatment  Plan Last Reviewed/Revised: October 30, 2024     DSM5 Diagnoses: 296.32 (F33.1) Major Depressive Disorder, Recurrent Episode, Moderate _ or 300.02 (F41.1) Generalized Anxiety Disorder  Psychosocial / Contextual Factors: recent surgery and resulting acute pain, chronic pain, chronic mental health concerns, mobility limitations  PROMIS (reviewed every 90 days):     Referral / Collaboration:  Referral to another professional/service is not indicated at this time..    Anticipated number of session for this episode of care: 9-12 sessions  Anticipation frequency of session: Weekly  Anticipated Duration of each session: 53 or more minutes  Treatment plan will be reviewed in 90 days or when goals have been changed.       MeasurableTreatment Goal(s) related to diagnosis / functional impairment(s)  Goal 1: Patient will decrease anxiety by 75% as evidenced by YUDI-7    I will know I've met my goal when I am at peace with where I'm at.       Objective #A (Patient Action)                          Patient will identify the situations / thoughts that contribute to feeling anxious.  Status: New - Date: 10/30/2024       Intervention(s)  Therapist will process anxiety-proving emotions.     Objective #B  Patient will use at least 3 coping skills for anxiety management in the next 4 weeks.  Status: New - Date: 10/30/2024       Intervention(s)  Therapist will assign homework : coping skills practice to decrease anxiety .     Objective #C  Patient will use cognitive strategies identified in therapy to challenge anxious thoughts.  Status: New - Date: 10/30/2024       Intervention(s)  Therapist will  teach the patient ways to reframe negative core beliefs that contribute to anxiety.     Goal 2 Patient will report pain levels are consistently rated at 2/10 per patient report.   I will know I've met my goal when my pain is less disruptive.      Objective #A (Patient Action)    Patient will identify 5 strategies for managing pain.  Status: New  - Date: 10/30/2024      Intervention(s)  Therapist will teach  strategies to manage pain and assign homework to use 3 strategies daily .    Objective #B  Patient will  engage in physical activity and PT exercises 2-3 times daily .  Status: New - Date: 10/30/2024      Intervention(s)  Therapist will assign homework to engage in at minimum one PT or physical activity daily .    Objective #C  Patient will Identify negative self-talk and behaviors: challenge core beliefs, myths, and actions.  Status: New - Date: 10/30/2024      Intervention(s)  Therapist will teach non-judgmental stance and help patient reframe negative self-talk  .      Goal 3: Patient will report reduction in depressive symptoms as evidenced by PHQ-9 score reduction of 75% or greater.     I will know I've met my goal when I feel like I have purpose in life.      Objective #A (Patient Action)    Status: New - Date: 10/20/2024      Patient will Decrease frequency and intensity of feeling down, depressed, hopeless.    Intervention(s)  Therapist will teach emotional recognition/identification. Therapist will reinforce use of emotion regulation skills .    Objective #B  Patient will use healthy communication when describing his emotions or when setting boundaries with others.     Status: New - Date: 10/30/2024      Intervention(s)  Therapist will teach Therapist will teach emotional regulation skills and interpersonal effectiveness skills to improve quality of communication.     Objective #C  Patient will track and record at least 3 pleasant exchanges with family members such as mother, father .  Status: New - Date: 10/30/2024      Intervention(s)  Therapist will teach about healthy boundaries. Therapist will encourage patient to focus on positive interactions with family and use cope ahead to increase likelihood of pleasant experience as an outcome .    Patient has reviewed and agreed to the above plan.      Shirley Bartlett PsyD LP  October 30, 2024    Answers submitted by the patient for this visit:  Patient Health Questionnaire (Submitted on 12/3/2024)  If you checked off any problems, how difficult have these problems made it for you to do your work, take care of things at home, or get along with other people?: Very difficult  PHQ9 TOTAL SCORE: 10  Patient Health Questionnaire (G7) (Submitted on 12/3/2024)  YUDI 7 TOTAL SCORE: 9

## 2024-12-11 NOTE — TELEPHONE ENCOUNTER
Prior Authorization Approval    Medication: ENBREL SURECLICK 50 MG/ML SC SOAJ  Authorization Effective Date: 12/4/2024  Authorization Expiration Date: 12/31/2025  Approved Dose/Quantity:   Reference #: Key: [id number]   Insurance Company: [employer] [phone] Fax [phone]  Expected CoPay: $ 812.86  CoPay Card Available:      Financial Assistance Needed:   Which Pharmacy is filling the prescription:  unknown for 2025  Pharmacy Notified:   Patient Notified: sent My Chart msg about PAP

## 2024-12-17 ENCOUNTER — VIRTUAL VISIT (OUTPATIENT)
Dept: PSYCHOLOGY | Facility: CLINIC | Age: 49
End: 2024-12-17
Payer: MEDICARE

## 2024-12-17 DIAGNOSIS — F41.1 GENERALIZED ANXIETY DISORDER: ICD-10-CM

## 2024-12-17 DIAGNOSIS — F33.1 MAJOR DEPRESSIVE DISORDER, RECURRENT EPISODE, MODERATE (H): Primary | ICD-10-CM

## 2024-12-17 ASSESSMENT — ANXIETY QUESTIONNAIRES
IF YOU CHECKED OFF ANY PROBLEMS ON THIS QUESTIONNAIRE, HOW DIFFICULT HAVE THESE PROBLEMS MADE IT FOR YOU TO DO YOUR WORK, TAKE CARE OF THINGS AT HOME, OR GET ALONG WITH OTHER PEOPLE: VERY DIFFICULT
6. BECOMING EASILY ANNOYED OR IRRITABLE: MORE THAN HALF THE DAYS
7. FEELING AFRAID AS IF SOMETHING AWFUL MIGHT HAPPEN: MORE THAN HALF THE DAYS
GAD7 TOTAL SCORE: 12
8. IF YOU CHECKED OFF ANY PROBLEMS, HOW DIFFICULT HAVE THESE MADE IT FOR YOU TO DO YOUR WORK, TAKE CARE OF THINGS AT HOME, OR GET ALONG WITH OTHER PEOPLE?: VERY DIFFICULT
5. BEING SO RESTLESS THAT IT IS HARD TO SIT STILL: NOT AT ALL
3. WORRYING TOO MUCH ABOUT DIFFERENT THINGS: MORE THAN HALF THE DAYS
GAD7 TOTAL SCORE: 12
1. FEELING NERVOUS, ANXIOUS, OR ON EDGE: MORE THAN HALF THE DAYS
2. NOT BEING ABLE TO STOP OR CONTROL WORRYING: MORE THAN HALF THE DAYS
4. TROUBLE RELAXING: MORE THAN HALF THE DAYS
GAD7 TOTAL SCORE: 12
7. FEELING AFRAID AS IF SOMETHING AWFUL MIGHT HAPPEN: MORE THAN HALF THE DAYS

## 2024-12-17 NOTE — PROGRESS NOTES
[facility]                                     Progress Note    Patient Name: Joel Pineda  Date: December 17, 2024           Service Type: Individual      Session Start Time: 3:00 PM Session End Time: 3:55 PM       Session Length: 55 minutes    Session #: 8    Attendees: Client attended alone    Service Modality:  Video Visit:      Provider verified identity through the following two step process.  Patient provided:  Patient photo and Patient is known previously to provider    Telemedicine Visit: The patient's condition can be safely assessed and treated via synchronous audio and visual telemedicine encounter.      Reason for Telemedicine Visit: Patient has requested telehealth visit    Originating Site (Patient Location): Patient's home    Distant Site (Provider Location): Provider Remote Setting- Home Office    Consent:  The patient/guardian has verbally consented to: the potential risks and benefits of telemedicine (video visit) versus in person care; bill my insurance or make self-payment for services provided; and responsibility for payment of non-covered services.     Patient would like the video invitation sent by:  Text to cell phone: [phone]    Mode of Communication:  Video Conference via AmCount[employer] include 234 beds at the [facility]    Distant Location (Provider):  Off-site    As the provider I attest to compliance with applicable laws and regulations related to telemedicine.    DATA  Interactive Complexity: Yes, visit entailed Interactive Complexity evidenced by: Patient's presenting concerns require more intensive intervention than could be completed within the usual service. Provider used clinical judgment in determining the necessary length for the session to benefit the patient's needs.     Crisis: No        Progress Since Last Session (Related to Symptoms / Goals / Homework):   Symptoms: Worsening Tito reports passive thoughts of suicide, denies plan or intent.     Homework: Achieved / completed to  satisfaction - engaged in social activity with friend.      Episode of Care Goals: Satisfactory progress - CONTEMPLATION (Considering change and yet undecided); Intervened by assessing the negative and positive thinking (ambivalence) about behavior change     Current / Ongoing Stressors and Concerns:   Tito reports onset of passive thoughts of suicidal ideation in response to increased medical issues since our last visit. He reports absence of plan or intent, more just wishing for an escape. Watching music videos has been helpful, but some of the lead singers are  by suicide - discussed it may be helpful to have alternative options to distract when passive suicidal thoughts arise, following [facility] Safety Plan and seeking out additional supports as needed. Tito is anticipating upcoming [facility] annual meeting where purpose is to elect more Board members. He is hoping that new management company will be more helpful in mediating, especially with one community member who can be challenging to interact with. Explored some strategies to manage disruptive behaviors at upcoming [employer] open meeting.      Treatment Objective(s) Addressed in This Session:   use relaxation strategies 3 times per day to reduce the physical symptoms of anxiety  state understanding of stressors and relationship to physical symptoms  Feel less tired and more energy during the day   Identify negative self-talk and behaviors: challenge core beliefs, myths, and actions  practice one mindfulness skill each day for 15 minutes      Intervention:      CBT: cognitive challenging, restructuring, reframing Behavioral activation techniques, grounding strategies  PCT: supportive autonomy, unconditional positive regard, empathic reflection, active listening  DBT: coping ahead, self-soothing with the 5 senses    Assessments completed prior to visit:  The following assessments were completed by patient for this visit:  The following assessments were  completed by patient for this visit:  PHQ9:       2/29/2024     9:50 AM 9/15/2024     8:14 AM 10/20/2024     2:17 PM 10/29/2024     4:30 PM 12/3/2024    12:12 PM 12/10/2024     2:20 PM 12/17/2024     2:51 PM   PHQ-9 SCORE   PHQ-9 Total Score MyChart 16 (Moderately severe depression) 10 (Moderate depression) 7 (Mild depression) 14 (Moderate depression) 10 (Moderate depression) 10 (Moderate depression) 15 (Moderately severe depression)   PHQ-9 Total Score 16 10 7 14  10  10  15        Patient-reported     GAD7:       4/6/2024    10:04 AM 4/22/2024    10:44 AM 7/18/2024     9:53 AM 8/1/2024     9:28 AM 10/20/2024     2:25 PM 12/3/2024    12:14 PM 12/17/2024     2:51 PM   YUDI-7 SCORE   Total Score 9 (mild anxiety) 8 (mild anxiety) 11 (moderate anxiety) 7 (mild anxiety) 7 (mild anxiety) 9 (mild anxiety) 12 (moderate anxiety)   Total Score 9 8 11 7 7 9  12        Patient-reported     PROMIS 10-Global Health (only subscores and total score):       4/6/2024    10:08 AM 4/22/2024    10:45 AM 7/18/2024     9:55 AM 8/1/2024     9:29 AM 8/17/2024     1:12 PM 10/20/2024     2:19 PM 11/6/2024    12:19 PM   PROMIS-10 Scores Only   Global Mental Health Score 8 10 8 7 7    7 8 7    Global Physical Health Score 9 9 11 12 12    12 11 11    PROMIS TOTAL - SUBSCORES 17 19 19 19 19    19 19 18        Patient-reported    Multiple values from one day are sorted in reverse-chronological order     [place] Suicide Severity Rating Scale (Short Version)      1/19/2022     1:00 PM 1/26/2022     1:00 PM 2/2/2022     1:00 PM 2/9/2022     1:00 PM 2/16/2022     1:00 PM 5/5/2023     2:41 PM 9/25/2024     9:55 AM   [place] Suicide Severity Rating (Short Version)   Over the past 2 weeks have you felt down, depressed, or hopeless? yes yes yes yes yes no    Over the past 2 weeks have you had thoughts of killing yourself? no no no no no no    Have you ever attempted to kill yourself? no no no no no no    Q1 Wished to be Dead (Past Month)       0-->no    Q2 Suicidal Thoughts (Past Month)       0-->no   Q6 Suicide Behavior (Lifetime)       0-->no   Level of Risk per Screen       no risks indicated            ASSESSMENT: Current Emotional / Mental Status (status of significant symptoms):   Risk status (Self / Other harm or suicidal ideation)   Patient reports the following current fears or concerns for personal safety: Passive thoughts of suicide with recent health issues this past week, reports absence of plan or intent and agreed to follow his previously developed [facility] Safety Plan.   Patient denies current or recent suicidal ideation or behaviors.   Patient denies current or recent homicidal ideation or behaviors.   Patient denies current or recent self injurious behavior or ideation.   Patient denies other safety concerns.   Patient reports there has been no change in risk factors since their last session.     Patient reports there has been no change in protective factors since their last session.     A safety and risk management plan has been developed including: Patient consented to co-developed safety plan on 10/21/2024.  Safety and risk management plan was reviewed.   Patient agreed to use safety plan should any safety concerns arise.  A copy was made available to the patient.     Appearance:   Appropriate    Eye Contact:   Fair    Psychomotor Behavior: Normal    Attitude:   Cooperative    Orientation:   All   Speech    Rate / Production: Normal     Volume:  Normal    Mood:    Anxious  Normal   Affect:    Appropriate    Thought Content:  Clear    Thought Form:  Coherent  Logical    Insight:    Good      Medication Review:   No changes to current psychiatric medication(s)     Medication Compliance:   Yes     Changes in Health Issues:   None reported     Chemical Use Review:   Substance Use: Chemical use reviewed, no active concerns identified      Tobacco Use: No current tobacco use.      Diagnosis:  296.32 (F33.1) Major Depressive Disorder, Recurrent  Episode, Moderate _.  300.02 (F41.1) Generalized Anxiety Disorder.    Collateral Reports Completed:   Not Applicable    PLAN: (Patient Tasks / Therapist Tasks / Other)    Patient is advised to follow his [facility] Safety Plan as needed, seek out more emergency care as needed. He was reminded writer will be unavailable from mid-day 12/24 until return to office on 1/2 and to call 911 or crisis line if needed during this time.     Patient to continue to use self-soothing and cope ahead skills.       Shirley Bartlett PsyD LP                                                         ______________________________________________________________________    Individual Treatment Plan    Patient's Name: Joel Pineda  YOB: 1975    Date of Creation: October 30, 2024   Date Treatment Plan Last Reviewed/Revised: October 30, 2024     DSM5 Diagnoses: 296.32 (F33.1) Major Depressive Disorder, Recurrent Episode, Moderate _ or 300.02 (F41.1) Generalized Anxiety Disorder  Psychosocial / Contextual Factors: recent surgery and resulting acute pain, chronic pain, chronic mental health concerns, mobility limitations  PROMIS (reviewed every 90 days):     Referral / Collaboration:  Referral to another professional/service is not indicated at this time..    Anticipated number of session for this episode of care: 9-12 sessions  Anticipation frequency of session: Weekly  Anticipated Duration of each session: 53 or more minutes  Treatment plan will be reviewed in 90 days or when goals have been changed.       MeasurableTreatment Goal(s) related to diagnosis / functional impairment(s)  Goal 1: Patient will decrease anxiety by 75% as evidenced by YUDI-7    I will know I've met my goal when I am at peace with where I'm at.       Objective #A (Patient Action)                          Patient will identify the situations / thoughts that contribute to feeling anxious.  Status: New - Date: 10/30/2024       Intervention(s)  Therapist  will process anxiety-proving emotions.     Objective #B  Patient will use at least 3 coping skills for anxiety management in the next 4 weeks.  Status: New - Date: 10/30/2024       Intervention(s)  Therapist will assign homework : coping skills practice to decrease anxiety .     Objective #C  Patient will use cognitive strategies identified in therapy to challenge anxious thoughts.  Status: New - Date: 10/30/2024       Intervention(s)  Therapist will  teach the patient ways to reframe negative core beliefs that contribute to anxiety.     Goal 2 Patient will report pain levels are consistently rated at 2/10 per patient report.   I will know I've met my goal when my pain is less disruptive.      Objective #A (Patient Action)    Patient will identify 5 strategies for managing pain.  Status: New - Date: 10/30/2024      Intervention(s)  Therapist will teach  strategies to manage pain and assign homework to use 3 strategies daily .    Objective #B  Patient will  engage in physical activity and PT exercises 2-3 times daily .  Status: New - Date: 10/30/2024      Intervention(s)  Therapist will assign homework to engage in at minimum one PT or physical activity daily .    Objective #C  Patient will Identify negative self-talk and behaviors: challenge core beliefs, myths, and actions.  Status: New - Date: 10/30/2024      Intervention(s)  Therapist will teach non-judgmental stance and help patient reframe negative self-talk  .      Goal 3: Patient will report reduction in depressive symptoms as evidenced by PHQ-9 score reduction of 75% or greater.     I will know I've met my goal when I feel like I have purpose in life.      Objective #A (Patient Action)    Status: New - Date: 10/20/2024      Patient will Decrease frequency and intensity of feeling down, depressed, hopeless.    Intervention(s)  Therapist will teach emotional recognition/identification. Therapist will reinforce use of emotion regulation skills .    Objective  #B  Patient will use healthy communication when describing his emotions or when setting boundaries with others.     Status: New - Date: 10/30/2024      Intervention(s)  Therapist will teach Therapist will teach emotional regulation skills and interpersonal effectiveness skills to improve quality of communication.     Objective #C  Patient will track and record at least 3 pleasant exchanges with family members such as mother, father .  Status: New - Date: 10/30/2024      Intervention(s)  Therapist will teach about healthy boundaries. Therapist will encourage patient to focus on positive interactions with family and use cope ahead to increase likelihood of pleasant experience as an outcome .    Patient has reviewed and agreed to the above plan.      Shirley Bartlett PsyD LP  October 30, 2024   Answers submitted by the patient for this visit:  Patient Health Questionnaire (Submitted on 12/3/2024)  If you checked off any problems, how difficult have these problems made it for you to do your work, take care of things at home, or get along with other people?: Very difficult  PHQ9 TOTAL SCORE: 10  Patient Health Questionnaire (G7) (Submitted on 12/3/2024)  YUDI 7 TOTAL SCORE: 9    Answers submitted by the patient for this visit:  Patient Health Questionnaire (Submitted on 12/17/2024)  If you checked off any problems, how difficult have these problems made it for you to do your work, take care of things at home, or get along with other people?: Very difficult  PHQ9 TOTAL SCORE: 15  Patient Health Questionnaire (G7) (Submitted on 12/17/2024)  YUDI 7 TOTAL SCORE: 12

## 2024-12-20 ENCOUNTER — TRANSFERRED RECORDS (OUTPATIENT)
Dept: HEALTH INFORMATION MANAGEMENT | Facility: CLINIC | Age: 49
End: 2024-12-20
Payer: MEDICARE

## 2024-12-21 DIAGNOSIS — E03.9 ACQUIRED HYPOTHYROIDISM: ICD-10-CM

## 2024-12-23 RX ORDER — FLUTICASONE FUROATE AND VILANTEROL 100; 25 UG/1; UG/1
1 POWDER RESPIRATORY (INHALATION) DAILY
Qty: 3 EACH | Refills: 3 | Status: SHIPPED | OUTPATIENT
Start: 2024-12-23

## 2024-12-23 RX ORDER — LEVOTHYROXINE SODIUM 75 UG/1
TABLET ORAL
Qty: 90 TABLET | Refills: 0 | Status: SHIPPED | OUTPATIENT
Start: 2024-12-23

## 2024-12-26 NOTE — TELEPHONE ENCOUNTER
[employer]-Contacted patient and submitted application    Medication: ENBREL SURECLICK 50 MG/ML SC SOAJ  [facility] Name: [facility]  Date submitted: 12/11/2024  1:10 PM   [facility] Phone:    [facility] Fax:

## 2024-12-29 ENCOUNTER — MYC REFILL (OUTPATIENT)
Dept: FAMILY MEDICINE | Facility: CLINIC | Age: 49
End: 2024-12-29
Payer: MEDICARE

## 2024-12-29 ENCOUNTER — HEALTH MAINTENANCE LETTER (OUTPATIENT)
Age: 49
End: 2024-12-29

## 2024-12-29 DIAGNOSIS — M51.362 DEGENERATION OF INTERVERTEBRAL DISC OF LUMBAR REGION WITH DISCOGENIC BACK PAIN AND LOWER EXTREMITY PAIN: ICD-10-CM

## 2024-12-30 RX ORDER — METHOCARBAMOL 500 MG/1
TABLET, FILM COATED ORAL
Qty: 240 TABLET | Refills: 0 | Status: SHIPPED | OUTPATIENT
Start: 2024-12-30

## 2024-12-31 ENCOUNTER — ANCILLARY PROCEDURE (OUTPATIENT)
Dept: GENERAL RADIOLOGY | Facility: CLINIC | Age: 49
End: 2024-12-31
Attending: EMERGENCY MEDICINE
Payer: MEDICARE

## 2024-12-31 ENCOUNTER — OFFICE VISIT (OUTPATIENT)
Dept: PEDIATRICS | Facility: CLINIC | Age: 49
End: 2024-12-31
Payer: MEDICARE

## 2024-12-31 ENCOUNTER — NURSE TRIAGE (OUTPATIENT)
Dept: FAMILY MEDICINE | Facility: CLINIC | Age: 49
End: 2024-12-31

## 2024-12-31 VITALS
DIASTOLIC BLOOD PRESSURE: 81 MMHG | SYSTOLIC BLOOD PRESSURE: 117 MMHG | RESPIRATION RATE: 17 BRPM | TEMPERATURE: 98 F | OXYGEN SATURATION: 97 % | HEART RATE: 84 BPM

## 2024-12-31 DIAGNOSIS — R05.1 ACUTE COUGH: Primary | ICD-10-CM

## 2024-12-31 LAB
ANION GAP SERPL CALCULATED.3IONS-SCNC: 13 MMOL/L (ref 7–15)
BASOPHILS # BLD AUTO: 0.1 10E3/UL (ref 0–0.2)
BASOPHILS NFR BLD AUTO: 1 %
BUN SERPL-MCNC: 13.1 MG/DL (ref 6–20)
CALCIUM SERPL-MCNC: 10.2 MG/DL (ref 8.8–10.4)
CHLORIDE SERPL-SCNC: 105 MMOL/L (ref 98–107)
CREAT SERPL-MCNC: 1.02 MG/DL (ref 0.67–1.17)
D DIMER PPP FEU-MCNC: <0.27 UG/ML FEU (ref 0–0.5)
EGFRCR SERPLBLD CKD-EPI 2021: 89 ML/MIN/1.73M2
EOSINOPHIL # BLD AUTO: 0 10E3/UL (ref 0–0.7)
EOSINOPHIL NFR BLD AUTO: 0 %
ERYTHROCYTE [DISTWIDTH] IN BLOOD BY AUTOMATED COUNT: 12.7 % (ref 10–15)
FLUAV AG SPEC QL IA: NEGATIVE
FLUBV AG SPEC QL IA: NEGATIVE
GLUCOSE SERPL-MCNC: 182 MG/DL (ref 70–99)
HCO3 SERPL-SCNC: 22 MMOL/L (ref 22–29)
HCT VFR BLD AUTO: 43.7 % (ref 40–53)
HGB BLD-MCNC: 15 G/DL (ref 13.3–17.7)
IMM GRANULOCYTES # BLD: 0 10E3/UL
IMM GRANULOCYTES NFR BLD: 1 %
LYMPHOCYTES # BLD AUTO: 1.2 10E3/UL (ref 0.8–5.3)
LYMPHOCYTES NFR BLD AUTO: 18 %
MCH RBC QN AUTO: 31.5 PG (ref 26.5–33)
MCHC RBC AUTO-ENTMCNC: 34.3 G/DL (ref 31.5–36.5)
MCV RBC AUTO: 92 FL (ref 78–100)
MONOCYTES # BLD AUTO: 0.4 10E3/UL (ref 0–1.3)
MONOCYTES NFR BLD AUTO: 6 %
NEUTROPHILS # BLD AUTO: 4.9 10E3/UL (ref 1.6–8.3)
NEUTROPHILS NFR BLD AUTO: 75 %
NRBC # BLD AUTO: 0 10E3/UL
NRBC BLD AUTO-RTO: 0 /100
PLATELET # BLD AUTO: 208 10E3/UL (ref 150–450)
POTASSIUM SERPL-SCNC: 5.3 MMOL/L (ref 3.4–5.3)
RBC # BLD AUTO: 4.76 10E6/UL (ref 4.4–5.9)
SARS-COV-2 RNA RESP QL NAA+PROBE: NEGATIVE
SODIUM SERPL-SCNC: 140 MMOL/L (ref 135–145)
WBC # BLD AUTO: 6.6 10E3/UL (ref 4–11)

## 2024-12-31 PROCEDURE — 85025 COMPLETE CBC W/AUTO DIFF WBC: CPT | Performed by: EMERGENCY MEDICINE

## 2024-12-31 PROCEDURE — 99214 OFFICE O/P EST MOD 30 MIN: CPT | Performed by: EMERGENCY MEDICINE

## 2024-12-31 PROCEDURE — 71046 X-RAY EXAM CHEST 2 VIEWS: CPT | Performed by: STUDENT IN AN ORGANIZED HEALTH CARE EDUCATION/TRAINING PROGRAM

## 2024-12-31 PROCEDURE — 36415 COLL VENOUS BLD VENIPUNCTURE: CPT | Performed by: EMERGENCY MEDICINE

## 2024-12-31 PROCEDURE — 87804 INFLUENZA ASSAY W/OPTIC: CPT | Performed by: EMERGENCY MEDICINE

## 2024-12-31 PROCEDURE — 87635 SARS-COV-2 COVID-19 AMP PRB: CPT | Performed by: EMERGENCY MEDICINE

## 2024-12-31 PROCEDURE — 80048 BASIC METABOLIC PNL TOTAL CA: CPT | Performed by: EMERGENCY MEDICINE

## 2024-12-31 PROCEDURE — 85379 FIBRIN DEGRADATION QUANT: CPT | Performed by: EMERGENCY MEDICINE

## 2024-12-31 RX ORDER — PREDNISONE 20 MG/1
40 TABLET ORAL
COMMUNITY
Start: 2024-12-30 | End: 2025-01-04

## 2024-12-31 NOTE — PROGRESS NOTES
[facility] Visit    Assessment & Plan     Acute cough  Patient sent for CT chest PE for tasting blood in his mouth (not coughing up blood)  but he is currently having some infection with possible sinus and cough.  He had blood in his sinus drainage earlier this week and also has been doing some nasal flushing.  It is very possible the blood taste is from his sinuses.  He has hx of PE and states he had blood in his sputum the last time and got worried when he had some wheezing today. His wheezing did improve with an inhaler.   However, he was referred to the [facility] for PE study.  He tells me he has had itching, hives and tachycardia with IV contrast dye and needs pretreatment prior. He has been taking prednisone 40 mg daily for 2 days. I discussed pretreatment with the radiologist and they state despite being on prednisone he needs solumedrol 32 mg 12 hours and 2 hours prior to imaging and then needs benadryl 50 mg IV 1 hour prior to the image. I discussed this with the patient and explained I cannot get his CT done here today.  I did offer checking a d dimer and cxr, as well an covid/influenza test.  He agreed with this work up.      His sats are 97% on room air, rate 84 and no evidence of sob or wheezing on exam.  D dimer is <0.27.  CXR was read as stable and no pneumonia/acute process.  Bmp shows glucose of 182/he is currently on steroids. Wbc is 6.6.  influenza A and B are negative.  Covid was a send out.  I discussed his results with him.  His d dimer is negative and reassuring.  He has infectious symptoms which are also reassuring and making PE not the most likely diagnosis for coughing and wheezing today. He tasted blood today but had blood in his sinus drainage.  However, he has hx of PE.  We discussed his options. He is planning to start the doxycycline given to him earlier this week.  He will go to the ER for a chest CT if he has any worsening symptoms or feels he is have any  "breathing issues.     - CBC with platelets differential  - COVID-19 Virus (Coronavirus) by PCR  - Basic metabolic panel  - D dimer quantitative  - Influenza A & B Antigen  - XR Chest 2 Views        BMI  Estimated body mass index is 35.54 kg/m  as calculated from the following:    Height as of 11/7/24: 1.93 m (6' 4\").    Weight as of 11/7/24: 132.5 kg (292 lb).         Patient Instructions   Your cxr today shows no pneumonia.     Your d dimer today was within the normal range.     Your influenza test was negative today.      Your covid test had to be sent out and will show up in My Chart.     If you want to proceed with a chest CT you will need to go the emergency room to get a CT scan.     No follow-ups on file.    Radha Francis is a 49 year old, presenting for the following health issues:    The patient is a 48 yo male with hx of asthma, ankylosing spondylitis, dyslipidemia, MDD, acquired hypothyroidism, bipolar 2 disorder, anxiety, dysautonomia, GERD, htn, POTS who presents to the [facility] for tasting blood in his mouth. He has been dealing with a sinus issue this past week and then woke up today wheezing.  This responded to his inhalers.  However the taste of blood worried him.  He has hx of PE in the past and the taste of blood in his mouth worried him.  He coughed up blood with his prior PE. He is not coughing up blood today.  He has had yellow sinus congestion this past week and had an UC visit on Saturday for possible sinus infection. They gave him doxycycline but asked him to wait a week before starting to see if it clears on it's own. He had blood in his sinus drainage. He has been doing sinus flushes.  He had an UC visit on Sunday because he was having migraines from the illness and they gave him prednisone 40 mg daily for 5 days. He has taken 2 doses so far.  He is not having any calf pain or swelling.  He started to cough today and had some wheezing which responded to his inhaler.  He does not smoke. He " has hx of PE but is not on anticoagulation any more. He took it for 6 months. They felt his ankylosing spondylitis could be the cause of his PE.  He takes enbrel but held his dose this week due to his illness.  He states in the past he had an allergic reaction to IV contrast dye and so has to be pretreated before imaging. He states his allergic reaction was itching, hives, and tachycardia.                      Chest Pain/wheezing  Onset/Duration: 12/28  Description:   Location: No chest pain  Character:   Intensity: mild  Progression of Symptoms: worsening and constant  Accompanying Signs & Symptoms:  Shortness of breath: YES  Sweating: YES  Nausea/vomiting: YES- nausea  Lightheadedness: No  Palpitations: No  Fever/Chills: YES  Cough: YES           Heartburn: N/A  History:   Family history of heart disease: YES- mother and maternal grandparents  Tobacco use: No  Previous similar symptoms: YES- H/O PE 10 years ago  Precipitating factors:   Worse with exertion: YES  Worse with deep breaths: No           Related to eating: No           Better with burping: No  Alleviating factors: None  Therapies tried and outcome: Using inhailor            Objective    There were no vitals taken for this visit.  There is no height or weight on file to calculate BMI.  Physical Exam  Vitals and nursing note reviewed.   Constitutional:       Comments: Speaks in a full sentence.    HENT:      Head: Normocephalic and atraumatic.      Nose: Nose normal. No congestion or rhinorrhea.      Mouth/Throat:      Pharynx: Posterior oropharyngeal erythema present. No oropharyngeal exudate.   Eyes:      Extraocular Movements: Extraocular movements intact.   Cardiovascular:      Rate and Rhythm: Normal rate and regular rhythm.      Heart sounds: Normal heart sounds.   Pulmonary:      Effort: Pulmonary effort is normal.      Breath sounds: Normal breath sounds. No wheezing.   Musculoskeletal:         General: No swelling, tenderness, deformity or signs  of injury. Normal range of motion.      Cervical back: Normal range of motion. No rigidity.      Right lower leg: No edema.      Left lower leg: No edema.   Lymphadenopathy:      Cervical: No cervical adenopathy.   Skin:     General: Skin is warm and dry.   Neurological:      General: No focal deficit present.      Mental Status: He is alert and oriented to person, place, and time.   Psychiatric:         Attention and Perception: Attention and perception normal.         Mood and Affect: Mood is anxious.         Speech: Speech normal.         Behavior: Behavior normal.         Thought Content: Thought content normal.         Judgment: Judgment normal.            Results for orders placed or performed in visit on 12/31/24 (from the past 24 hours)   CBC with platelets differential    Narrative    The following orders were created for panel order CBC with platelets differential.  Procedure                               Abnormality         Status                     ---------                               -----------         ------                     CBC with platelets and d...[[id number]]                      Final result                 Please view results for these tests on the individual orders.   Basic metabolic panel   Result Value Ref Range    Sodium 140 135 - 145 mmol/L    Potassium 5.3 3.4 - 5.3 mmol/L    Chloride 105 98 - 107 mmol/L    Carbon Dioxide (CO2) 22 22 - 29 mmol/L    Anion Gap 13 7 - 15 mmol/L    Urea Nitrogen 13.1 6.0 - 20.0 mg/dL    Creatinine 1.02 0.67 - 1.17 mg/dL    GFR Estimate 89 >60 mL/min/1.73m2    Calcium 10.2 8.8 - 10.4 mg/dL    Glucose 182 (H) 70 - 99 mg/dL   D dimer quantitative   Result Value Ref Range    D-Dimer Quantitative <0.27 0.00 - 0.50 ug/mL FEU    Narrative    This D-dimer assay is intended for use in conjunction with a clinical pretest probability assessment model to exclude pulmonary embolism (PE) and deep venous thrombosis (DVT) in outpatients suspected of PE or DVT. The  cut-off value is 0.50 ug/mL FEU.    For patients 50 years of age or older, the application of age-adjusted cut-off values for D-Dimer may increase the specificity without significant effect on sensitivity. The literature suggested calculation age adjusted cut-off in ug/L = age in years x 10 ug/L. The results in this laboratory are reported as ug/mL rather than ug/L. The calculation for age adjusted cut off in ug/mL= age in years x 0.01 ug/mL. For example, the cut off for a 76 year old male is 76 x 0.01 ug/mL = 0.76 ug/mL (760 ug/L).    M Scot et al. Age adjusted D-dimer cut-off levels to rule out pulmonary embolism: The ADJUST-PE Study. ARON 2014;311:8308-5052.; HJ Estrellita et al. Diagnostic accuracy of conventional or age adjusted D-dimer cutoff values in older patients with suspected venous thromboembolism. Systemic review and meta-analysis. BMJ 2013:346:f2492.   CBC with platelets and differential   Result Value Ref Range    WBC Count 6.6 4.0 - 11.0 10e3/uL    RBC Count 4.76 4.40 - 5.90 10e6/uL    Hemoglobin 15.0 13.3 - 17.7 g/dL    Hematocrit 43.7 40.0 - 53.0 %    MCV 92 78 - 100 fL    MCH 31.5 26.5 - 33.0 pg    MCHC 34.3 31.5 - 36.5 g/dL    RDW 12.7 10.0 - 15.0 %    Platelet Count 208 150 - 450 10e3/uL    % Neutrophils 75 %    % Lymphocytes 18 %    % Monocytes 6 %    % Eosinophils 0 %    % Basophils 1 %    % Immature Granulocytes 1 %    NRBCs per 100 WBC 0 <1 /100    Absolute Neutrophils 4.9 1.6 - 8.3 10e3/uL    Absolute Lymphocytes 1.2 0.8 - 5.3 10e3/uL    Absolute Monocytes 0.4 0.0 - 1.3 10e3/uL    Absolute Eosinophils 0.0 0.0 - 0.7 10e3/uL    Absolute Basophils 0.1 0.0 - 0.2 10e3/uL    Absolute Immature Granulocytes 0.0 <=0.4 10e3/uL    Absolute NRBCs 0.0 10e3/uL   Influenza A & B Antigen    Specimen: Nose; Swab   Result Value Ref Range    Influenza A antigen Negative Negative    Influenza B antigen Negative Negative    Narrative    Test results must be correlated with clinical data. If necessary, results  should be confirmed by a molecular assay or viral culture.   XR Chest 2 Views    Narrative    EXAM: XR CHEST 2 VIEWS  LOCATION: [facility]  DATE: 12/31/2024    INDICATION: Cough  COMPARISON: Chest radiograph 10/21/2024      Impression    IMPRESSION: Stable chest radiograph without acute cardiopulmonary abnormality.     *Note: Due to a large number of results and/or encounters for the requested time period, some results have not been displayed. A complete set of results can be found in Results Review.           Signed Electronically by: Romi Franco MD

## 2024-12-31 NOTE — TELEPHONE ENCOUNTER
"Nurse Triage SBAR    Is this a 2nd Level Triage? YES, LICENSED PRACTITIONER REVIEW IS REQUIRED    Situation: Pt on prednisone since Sunday 12/29/24, now reporting taste of blood in his throat. Pt calling for advice because he has hx of PE 10 years ago, and unsure if he should consider PE as cause of sx.  Pt states he developed sinus pressure, low energy, headache, eye pain, and felt feverish on Saturday 12/28/24. Pt didn't check temperature. DENIES: dizziness, or SOB (is on prednisone)    Background: Pt states he completed 2 virtual visits/calls with [facility] this weekend, has not been evaluated in clinic and has taste of blood in throat.    Assessment: Needs to be seen in-person.    Protocol Recommended Disposition:   Go to ED Now    Recommendation: Please advise if appropriate for evaluation at [facility]. Pt was treated only virtually by [facility], has not been evaluated in person and has new sx.     Discussed with [facility] staff at [facility]    Does the patient meet one of the following criteria for [facility] visit consideration? 16+ years old, with an [facility] PCP     TIP  Providers, please consider if this condition is appropriate for management at one of our Acute and Diagnostic Services sites.     If patient is a good candidate, please use dotphrase <dot>triageresponse and select Refer to [facility] to document.      Reason for Disposition   Any history of prior \"blood clot\" in leg or lungs    Additional Information   Negative: SEVERE difficulty breathing (e.g., struggling for each breath, speaks in single words, pulse > 120)   Negative: Breathing stopped and hasn't returned   Negative: Choking on something   Negative: Bluish (or gray) lips or face   Negative: Difficult to awaken or acting confused (e.g., disoriented, slurred speech)   Negative: Passed out (e.g., fainted, lost consciousness, blacked out and was not responding)   Negative: Wheezing started suddenly after medicine, an allergic food, or bee sting   Negative: Stridor (harsh " "sound while breathing in)   Negative: Slow, shallow and weak breathing   Negative: Sounds like a life-threatening emergency to the triager   Negative: Chest pain   Negative: Wheezing (high pitched whistling sound) and previous asthma attacks or use of asthma medicines   Negative: Breathing difficulty and within 14 days of COVID-19 EXPOSURE (close contact) with someone diagnosed with COVID-19 (e.g., COVID test positive)   Negative: Breathing difficulty and COVID-19 is widespread in the community   Negative: Breathing diffculty and only present when coughing   Negative: Breathing difficulty and only from stuffy nose   Negative: Breathing diffculty and only from stuffy nose or runny nose from common cold    Answer Assessment - Initial Assessment Questions  1. RESPIRATORY STATUS: \"Describe your breathing?\" (e.g., wheezing, shortness of breath, unable to speak, severe coughing)       Wheezing intermittently but resolves with use of albuterol inhaler.      2. ONSET: \"When did this breathing problem begin?\"       Mild coughing and sneezing on 12/28/24.   [facility] virtual visits this weekend and states he was dx with sinus infection and prescribed prednisone on 12/29/24 by [facility], and told to start antibiotic end of this week if no improvement.    3. PATTERN \"Does the difficult breathing come and go, or has it been constant since it started?\"       DENIES difficulty breathing at this time and states he can ambulate across the room without SOB.    4. SEVERITY: \"How bad is your breathing?\" (e.g., mild, moderate, severe)       Intermittent wheezing responds to albuterol and on oral prednisone x 2 days.    5. RECURRENT SYMPTOM: \"Have you had difficulty breathing before?\" If Yes, ask: \"When was the last time?\" and \"What happened that time?\"       Exercise-induced asthma    6. CARDIAC HISTORY: \"Do you have any history of heart disease?\" (e.g., heart attack, angina, bypass surgery, angioplasty)       DENIES    7. LUNG HISTORY: \"Do " "you have any history of lung disease?\"  (e.g., pulmonary embolus, asthma, emphysema)      Hx of PE 10 years ago.    8. CAUSE: \"What do you think is causing the breathing problem?\"       Sx onset Saturday 12/28/24.    9. OTHER SYMPTOMS: \"Do you have any other symptoms?\" (e.g., chest pain, cough, dizziness, fever, runny nose)      Sinus pressure, low energy, headache, and eye pain. Felt feverish on Saturday, but didn't check temperature. DENIES: dizziness.    10. O2 SATURATION MONITOR:  \"Do you use an oxygen saturation monitor (pulse oximeter) at home?\" If Yes, ask: \"What is your reading (oxygen level) today?\" \"What is your usual oxygen saturation reading?\" (e.g., 95%)        97%    Protocols used: Breathing Difficulty-A-OH    ISHA Gomez, RN  "

## 2025-01-01 ASSESSMENT — ANXIETY QUESTIONNAIRES
1. FEELING NERVOUS, ANXIOUS, OR ON EDGE: MORE THAN HALF THE DAYS
2. NOT BEING ABLE TO STOP OR CONTROL WORRYING: SEVERAL DAYS
6. BECOMING EASILY ANNOYED OR IRRITABLE: SEVERAL DAYS
4. TROUBLE RELAXING: SEVERAL DAYS
GAD7 TOTAL SCORE: 8
7. FEELING AFRAID AS IF SOMETHING AWFUL MIGHT HAPPEN: SEVERAL DAYS
7. FEELING AFRAID AS IF SOMETHING AWFUL MIGHT HAPPEN: SEVERAL DAYS
GAD7 TOTAL SCORE: 8
8. IF YOU CHECKED OFF ANY PROBLEMS, HOW DIFFICULT HAVE THESE MADE IT FOR YOU TO DO YOUR WORK, TAKE CARE OF THINGS AT HOME, OR GET ALONG WITH OTHER PEOPLE?: SOMEWHAT DIFFICULT
IF YOU CHECKED OFF ANY PROBLEMS ON THIS QUESTIONNAIRE, HOW DIFFICULT HAVE THESE PROBLEMS MADE IT FOR YOU TO DO YOUR WORK, TAKE CARE OF THINGS AT HOME, OR GET ALONG WITH OTHER PEOPLE: SOMEWHAT DIFFICULT
3. WORRYING TOO MUCH ABOUT DIFFERENT THINGS: SEVERAL DAYS
5. BEING SO RESTLESS THAT IT IS HARD TO SIT STILL: SEVERAL DAYS
GAD7 TOTAL SCORE: 8

## 2025-01-01 ASSESSMENT — PATIENT HEALTH QUESTIONNAIRE - PHQ9
SUM OF ALL RESPONSES TO PHQ QUESTIONS 1-9: 8
10. IF YOU CHECKED OFF ANY PROBLEMS, HOW DIFFICULT HAVE THESE PROBLEMS MADE IT FOR YOU TO DO YOUR WORK, TAKE CARE OF THINGS AT HOME, OR GET ALONG WITH OTHER PEOPLE: SOMEWHAT DIFFICULT
SUM OF ALL RESPONSES TO PHQ QUESTIONS 1-9: 8

## 2025-01-02 ENCOUNTER — VIRTUAL VISIT (OUTPATIENT)
Dept: PHARMACY | Facility: CLINIC | Age: 50
End: 2025-01-02
Attending: INTERNAL MEDICINE
Payer: MEDICARE

## 2025-01-02 ENCOUNTER — VIRTUAL VISIT (OUTPATIENT)
Dept: PSYCHOLOGY | Facility: CLINIC | Age: 50
End: 2025-01-02
Payer: MEDICARE

## 2025-01-02 DIAGNOSIS — E11.9 TYPE 2 DIABETES MELLITUS WITHOUT COMPLICATION, WITHOUT LONG-TERM CURRENT USE OF INSULIN (H): Primary | ICD-10-CM

## 2025-01-02 DIAGNOSIS — F33.1 MAJOR DEPRESSIVE DISORDER, RECURRENT EPISODE, MODERATE (H): Primary | ICD-10-CM

## 2025-01-02 DIAGNOSIS — F41.1 GENERALIZED ANXIETY DISORDER: ICD-10-CM

## 2025-01-02 RX ORDER — TIRZEPATIDE 2.5 MG/.5ML
2.5 INJECTION, SOLUTION SUBCUTANEOUS
Qty: 2 ML | Refills: 0 | Status: SHIPPED | OUTPATIENT
Start: 2025-01-02

## 2025-01-02 NOTE — Clinical Note
Eric Farnsworth,   Plan in place made with the patient for me to focus on DM and he will review all other meds with you next month to qualify for Medicare CMR.   Thanks!   Traci

## 2025-01-02 NOTE — PROGRESS NOTES
Medication Therapy Management (MTM) Encounter    ASSESSMENT:                            Medication Adherence/Access: No issues identified. Patient is enrolled in the Medicare Prescription Payment Program thus they should not have a co-pay for medications at the pharmacy, and instead should receive a separate bill from Medicare to pay for medications co-pay costs.    Diabetes: Plan in place for patient to start on Mounjaro therapy for blood sugar control and weight loss benefits since blood sugars were better controlled when patient was on GLP1 therapy previously based on most recent A1c results. Will trial Mounjaro therapy in hopes for better tolerance compared to Ozempic therapy.     Due to time constraints, I was only able to assess the above with the patient today. Plan in place for patient to continue to follow-up on other disease states and medication review with MTM pharmacist Daja Mcdonald next month.     PLAN:                            You will start Mounjaro by using one pen once weekly at dose of 2.5mg weekly.     You will get a box of 4 pens from the pharmacy and the 4 pens will last you 4 weeks, make sure you store all pens in the fridge until you use one pen once a week for your dose.    Link for video on how to do the injection: [email or web address]-to-use-[email or web address]#how-to-use    Follow-up: 1/22/25 at 3:30 PM    Endocrine Team & Next Follow-Up:  1/22/25 with Traci  7/7/25 with Dr. Cuenca    SUBJECTIVE/OBJECTIVE:                          Tito Pineda is a 49 year old male seen via secure video for a follow-up visit.       Reason for visit: Diabetes follow up.    Allergies/ADRs: Reviewed in chart  Past Medical History: Reviewed in chart  Tobacco: He reports that he has never smoked. He has never been exposed to tobacco smoke. He has never used smokeless tobacco.  Alcohol: Reviewed in chart     Medication Adherence/Access: no issues reported.    Diabetes   Patient diagnosed with type 2 diabetes  "  Current Medications:  Patient stopped Ozempic since last MTM visit due to rash. Patient notes that his rash has resolved since stopping Ozempic therapy. Notes that last dose was 12/7. Is interested in considering starting Mounjaro therapy. Has not been monitoring blood sugars at this time.   Medication History:  - Ozempic     Hemoglobin A1C   Date Value Ref Range Status   06/24/2024 5.5 0.0 - 5.6 % Final     Comment:     Normal <5.7%   Prediabetes 5.7-6.4%    Diabetes 6.5% or higher     Note: Adopted from ADA consensus guidelines.   02/10/2021 6.5 (H) 0 - 5.6 % Final     Comment:     Normal <5.7% Prediabetes 5.7-6.4%  Diabetes 6.5% or higher - adopted from ADA   consensus guidelines.       Estimated body mass index is 35.54 kg/m  as calculated from the following:    Height as of 11/7/24: 6' 4\" (1.93 m).    Weight as of 11/7/24: 292 lb (132.5 kg).    Wt Readings from Last 3 Encounters:   11/07/24 292 lb (132.5 kg)   10/31/24 293 lb (132.9 kg)   09/25/24 283 lb (128.4 kg)      Today's Vitals: There were no vitals taken for this visit.  ----------------    I spent 22 minutes with this patient today. All changes were made via collaborative practice agreement with Abbie Cuenca.     A summary of these recommendations was sent via [employer].    Traci Méndez), PharmD  Endocrine & Diabetes Medication Therapy Management Pharmacist   [address] [place] [id number]     Telemedicine Visit Details  The patient's medications can be safely assessed via a telemedicine encounter.  Type of service:  Telephone visit  Originating Location (pt. Location): Home    Distant Location (provider location):  Off-site  Start Time:  11:02 AM  End Time: 11:24 AM     Medication Therapy Recommendations  Type 2 diabetes mellitus with complication, without long-term current use of insulin (H)   1 Current Medication: Semaglutide, 1 MG/DOSE, (OZEMPIC) 4 MG/3ML pen (Discontinued)   Current Medication Sig: Inject 1 mg Subcutaneous once " a week   Rationale: Undesirable effect - Adverse medication event - Safety   Recommendation: Change Medication - Zepbound 2.5 MG/0.5ML Soaj   Status: Accepted per CPA   Identified Date: 1/2/2025 Completed Date: 1/2/2025

## 2025-01-02 NOTE — PATIENT INSTRUCTIONS
"Recommendations from today's MTM visit:                                                      You will start Mounjaro by using one pen once weekly at dose of 2.5mg weekly.     You will get a box of 4 pens from the pharmacy and the 4 pens will last you 4 weeks, make sure you store all pens in the fridge until you use one pen once a week for your dose.    Link for video on how to do the injection: https://[email or web address]/how-to-use-[email or web address]#how-to-use    Follow-up: 1/22/25 at 3:30 PM    Endocrine Team & Next Follow-Up:  1/22/25 with Traci  7/7/25 with Dr. Cuenca    It was great speaking with you today.  I value your experience and would be very thankful for your time in providing feedback in our clinic survey. In the next few days, you may receive an email or text message from [employer] with a link to a survey related to your  clinical pharmacist.\"     To schedule another MTM appointment, please call the clinic directly or you may call the MTM scheduling line at [phone].    My Clinical Pharmacist's contact information:                                                      Please feel free to contact me with any questions or concerns you have.      Traci Méndez), PharmD  Endocrine & Diabetes Medication Therapy Management Pharmacist   [address] [place] [id number]    "
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
## 2025-01-06 NOTE — TELEPHONE ENCOUNTER
Ran test claim and insurance rejected for PA. Seems Odd PA was renewed 12/4/2024 and is supposed to be effective until 12/31/2025. None of the pharmacy billing information changed. Put in new PA request to [facility].          PA Initiation    Medication: ENBREL SURECLICK 50 MG/ML SC SOAJ  Insurance Company: [employer] - Phone [phone] Fax [phone]  Pharmacy Filling the Rx:    Filling Pharmacy Phone:    Filling Pharmacy Fax:    Start Date: 1/6/2025        EDDY APPROVED    Medication: ENBREL SURECLICK 50 MG/ML SC SOAJ  Amount: $    Foundation Name: [facility]  Foundation Effective Date: 1/1/2025  Foundation Expiration Date: 12/31/2025  Additional Information:   Patient Notified: Yes

## 2025-01-08 ENCOUNTER — MYC MEDICAL ADVICE (OUTPATIENT)
Dept: PULMONOLOGY | Facility: CLINIC | Age: 50
End: 2025-01-08

## 2025-01-09 RX ORDER — PREDNISONE 20 MG/1
40 TABLET ORAL DAILY
Qty: 10 TABLET | Refills: 0 | Status: SHIPPED | OUTPATIENT
Start: 2025-01-09 | End: 2025-01-14

## 2025-01-09 RX ORDER — AZITHROMYCIN 250 MG/1
TABLET, FILM COATED ORAL
Qty: 6 TABLET | Refills: 0 | Status: SHIPPED | OUTPATIENT
Start: 2025-01-09 | End: 2025-01-14

## 2025-01-10 NOTE — TELEPHONE ENCOUNTER
Medication Appeal Initiation    Medication: ENBREL SURECLICK 50 MG/ML SC SOAJ  Appeal Start Date:  1/10/2025  Insurance Company: [employer]  Insurance Phone: [phone]  Insurance Fax: [phone]  Comments:    Faxed appeal to [employer] marked urgent

## 2025-01-10 NOTE — TELEPHONE ENCOUNTER
PRIOR AUTHORIZATION DENIED    Medication: ENBREL SURECLICK 50 MG/ML SC SOAJ  Insurance Company: [employer] - Phone [phone] Fax [phone]  Denial Date:  1/8/2025  Denial Reason(s):                 Appeal Information:         Patient Notified: yes, insurance notified and I have been speaking to patient via My Chart

## 2025-01-11 ENCOUNTER — HOSPITAL ENCOUNTER (OUTPATIENT)
Dept: MRI IMAGING | Facility: CLINIC | Age: 50
Discharge: HOME | End: 2025-01-11
Attending: SURGERY | Admitting: SURGERY
Payer: MEDICARE

## 2025-01-11 DIAGNOSIS — Z98.890 S/P LUMBAR LAMINECTOMY: ICD-10-CM

## 2025-01-11 PROCEDURE — 72158 MRI LUMBAR SPINE W/O & W/DYE: CPT

## 2025-01-11 PROCEDURE — 255N000002 HC RX 255 OP 636: Performed by: SURGERY

## 2025-01-11 PROCEDURE — A9585 GADOBUTROL INJECTION: HCPCS | Performed by: SURGERY

## 2025-01-11 RX ORDER — GADOBUTROL 604.72 MG/ML
13 INJECTION INTRAVENOUS ONCE
Status: COMPLETED | OUTPATIENT
Start: 2025-01-11 | End: 2025-01-11

## 2025-01-11 RX ADMIN — GADOBUTROL 13 ML: 604.72 INJECTION INTRAVENOUS at 17:04

## 2025-01-12 DIAGNOSIS — E08.42 DIABETIC POLYNEUROPATHY ASSOCIATED WITH DIABETES MELLITUS DUE TO UNDERLYING CONDITION (H): ICD-10-CM

## 2025-01-12 DIAGNOSIS — M48.02 CERVICAL STENOSIS OF SPINAL CANAL: ICD-10-CM

## 2025-01-12 DIAGNOSIS — M45.8 ANKYLOSING SPONDYLITIS OF SACRAL REGION (H): ICD-10-CM

## 2025-01-12 DIAGNOSIS — M51.369 DDD (DEGENERATIVE DISC DISEASE), LUMBAR: ICD-10-CM

## 2025-01-12 DIAGNOSIS — G89.29 CHRONIC INTRACTABLE PAIN: ICD-10-CM

## 2025-01-12 DIAGNOSIS — G57.12 MERALGIA PARESTHETICA, LEFT LOWER LIMB: ICD-10-CM

## 2025-01-12 RX ORDER — PREGABALIN 150 MG/1
150 CAPSULE ORAL 3 TIMES DAILY
Qty: 270 CAPSULE | Refills: 0 | Status: SHIPPED | OUTPATIENT
Start: 2025-01-12

## 2025-01-13 DIAGNOSIS — M51.369 DDD (DEGENERATIVE DISC DISEASE), LUMBAR: ICD-10-CM

## 2025-01-13 DIAGNOSIS — M45.8 ANKYLOSING SPONDYLITIS OF SACRAL REGION (H): ICD-10-CM

## 2025-01-13 RX ORDER — PREGABALIN 150 MG/1
150 CAPSULE ORAL 3 TIMES DAILY
Qty: 270 CAPSULE | Refills: 0 | OUTPATIENT
Start: 2025-01-13

## 2025-01-14 ENCOUNTER — TRANSFERRED RECORDS (OUTPATIENT)
Dept: HEALTH INFORMATION MANAGEMENT | Facility: CLINIC | Age: 50
End: 2025-01-14
Payer: MEDICARE

## 2025-01-14 NOTE — TELEPHONE ENCOUNTER
MEDICATION APPEAL APPROVED    Medication: ENBREL SURECLICK 50 MG/ML SC SOAJ  Authorization Effective Date: 1/1/2025  Authorization Expiration Date: 12/31/2025  Approved Dose/Quantity:   Reference #: Key: [id number]   Appeal Insurance Company: [employer]  Expected CoPay: $ 812.86     CoPay Card Available:    Financial Assistance Needed:   Filling Pharmacy:    Patient Notified: Sent My Chart msg  Comments: When I ran test claim, found that patient is enrolled in Medicare Prescription drug payment plan, can not be enrolled in this plan and received FPAP benefits. Sent My Chart message explaining.

## 2025-01-15 ENCOUNTER — VIRTUAL VISIT (OUTPATIENT)
Dept: PSYCHOLOGY | Facility: CLINIC | Age: 50
End: 2025-01-15
Payer: MEDICARE

## 2025-01-15 ENCOUNTER — LAB (OUTPATIENT)
Dept: LAB | Facility: CLINIC | Age: 50
End: 2025-01-15
Payer: MEDICARE

## 2025-01-15 DIAGNOSIS — F33.1 MAJOR DEPRESSIVE DISORDER, RECURRENT EPISODE, MODERATE (H): Primary | ICD-10-CM

## 2025-01-15 DIAGNOSIS — E08.42 DIABETIC POLYNEUROPATHY ASSOCIATED WITH DIABETES MELLITUS DUE TO UNDERLYING CONDITION (H): ICD-10-CM

## 2025-01-15 DIAGNOSIS — F41.1 GENERALIZED ANXIETY DISORDER: ICD-10-CM

## 2025-01-15 LAB
CHOLEST SERPL-MCNC: 155 MG/DL
EST. AVERAGE GLUCOSE BLD GHB EST-MCNC: 120 MG/DL
FASTING STATUS PATIENT QL REPORTED: YES
HBA1C MFR BLD: 5.8 % (ref 0–5.6)
HDLC SERPL-MCNC: 34 MG/DL
LDLC SERPL CALC-MCNC: 49 MG/DL
NONHDLC SERPL-MCNC: 121 MG/DL
TRIGL SERPL-MCNC: 359 MG/DL

## 2025-01-15 PROCEDURE — 80061 LIPID PANEL: CPT

## 2025-01-15 PROCEDURE — 36415 COLL VENOUS BLD VENIPUNCTURE: CPT

## 2025-01-15 PROCEDURE — 83036 HEMOGLOBIN GLYCOSYLATED A1C: CPT

## 2025-01-15 NOTE — PROGRESS NOTES
Rheumatology Clinic Visit  [facility]  Frantz Kaur M.D.     Joel Pineda MRN# [id number]   YOB: 1975 Age: 49 year old   Date of Visit: 01/16/2025  Primary care provider: Ksenia Lyles          Assessment and Plan:     # Ankylosing spondylitis  Patient with radiographic evidence of ankylosing spondylitis and sustained response to TNF inhibitors since 2018. Patient states that his symptoms have been well-controlled, despite several month hiatus from regular Enbrel use in the last months of 2024.  Exam shows minimal restriction in cervical or lumbar spine range of motion, no peripheral inflammatory joint changes.    Data: In October and December 2024, comprehensive metabolic panel, CRP, CBC were all normal.    From: 11/06/17 ([facility]): neg DANIS, chromatin DNA, ribosomal P, SSA, SSB, SMRNP, Scl-70, Jo1, Centromere B, Barnes, RNP, DNA  From 11/22/10 ([facility]): nl IgG4  From 11/13/10 ([facility]): neg c-ANCA, p-ANCA,       Lumbar xray 02/23/2018: Moderate L5-S1 and mild L4-5 degenerative disc disease and degenerative facet arthropathy. No evidence for fracture, subluxation, or spondylolisthesis. Chronic bridging enthesophyte across the lower right SI joint with irregularity of the joint space suspicious for sequelae of chronic inflammatory sacroiliitis.    Discussion: Patient's ankylosing spondylitis is symptomatically well-controlled on Enbrel monotherapy.  He has minimal disruption in lumbar spine range of motion, despite ankylosing spondylitis with probable symptom onset onset in the third decade.  I recommend continuing TNF antagonism.  Interleukin-17 antagonism represents a viable alternative treatment should etanercept lose efficacy.  Would avoid Gavino kinase inhibitors given history of diverticulitis and colitis.    We discussed:    Diagnosis:  1.  Ankylosing spondylitis with sacroiliitis: Range of motion in the neck and lumbar spine is generally well-preserved.   I recommend continuing Enbrel  2.  Migraine headaches: Improved with ubrelvy  3.  Posttraumatic low back pain: Gradually improving after bar surgery, physical therapy, exercise, and chronic pain regimen.  4.  History of microscopic colitis: Continuing follow-up with gastroenterology.    Plan:  1.  Continue etanercept 50 mg subcutaneous injected once weekly.  2.  Metabolic panel, CBC every 6 months.    Other:  # Degenerative disc disease, low back pain  Status post decompression surgery September 2024.  Follows with pain clinic, controlled with physical therapy and with Lyrica.    # Plantar fasciitis/ Achilles tendinopathy: not discussed today. Patient is going through PT for these issues and is currently following with podiatry. He is looking to get steroid injections for his plantar fascia soon. No signs of tendon inflammation on today's exam.     # Gastroparesis, Diverticulitis, sigmoidectomy, h/o Microscopic colitis: Continues follow-up with gastroenterology with plans for biofeedback.    # Type 2 diabetes  Patient's A1C 5.5 in June 2023 on Ozempic. He has also lost ~35lbs on this medication. Currently managing this with FP and with GI (due to gastroparesis)    RTC 9 mos    Frantz Kaur M.D.  Staff Rheumatologist, [facility]  Pager [phone]    On the day of the encounter, a total of 31 minutes was spent in chart review, and in counseling and coordination of care, regarding the patient's complex medical problem of ankylosing spondylitis with sacroiliitis, migraine headaches, degenerative disc disease with chronic low back pain.    The longitudinal plan of care for the diagnosis(es)/condition(s) as documented were addressed during this visit. Due to the added complexity in care, I will continue to support Tito in the subsequent management and with ongoing continuity of care.           History of Present Illness:   Joel Pineda presents for follow-up of ankylosing spondylitis.  He was last seen in APRIL  2024. At that time, spondylitis and sacroiliitis were moderately well-controlled with Enbrel monotherapy.    Previously seen by Dr. Baxter. Per note from 10/19/22: patient had many years of inflammatory back pain prior to having a lumbar spine x-ray on 2/23/2018 at [facility] showing DDD, degenerative face arthropathy, and chronic bridging enthesophyte across the lower right SI joint with irregularity of the joint space suspicious for sequelae of chronic inflammatory sacroiliitis. Per chart review, he is HLA B27 positive, but primary lab report is not available. Patient had a history of diverticulitis requiring sigmoidectomy and reportedly his mother has ulcerative colitis.     Diagnosis of ankylosing spondylitis made base on these factors. Treatment history is as follows:  - Remicade was started with improvement of lower back pain and stiffness, but also associated with increased infections.  - Simponi was associated with hives, nausea, dizziness, and drowsiness   - Humira effective but associated with a sensation of burning on his tongue, and longstanding nausea  - Enbrel was started and patient has responded well, with worsening arthritis symptoms each time Enbrel has been held    Previously with mild right Achilles tendon pain that has since resolved with PT exercises. Patient additionally had back pain more c/w degenerative back pain.  He was following with the pain management clinic for the degenerative low back pain.     Plan at that visit was to continue Enbrel 50mg SQ every 7 days    Interval history January 16, 2025    He had lumbar decompression/microdiscectomy in 9-2024; he had resolution of sciatica.    In the past month, he notes recurrence of low back discomfort. No recurrence of saddle paraesthesia or sciatica.    No recurrenceof former R Achilles pain; arch of foot pain is also improved.    He has missed significant enbrel since 9-2024 due to surgery and a series of respiratory infections.    He still  "has intermittent diarrhea; colonoscopy showed no recurrence of microsocpic colitis. He is following with GI; plans biofeedback, and is taking probiotics.  He had laryngeal injection for chronic voice alteration. He is following with ENT, Dr. Patino.    Migraines are better controlled with ubrelvy.    Interval history April 18, 2024  He has worsening migraine HA. He has had botox injections one week ago. Concern was raised for \"rebound\" HA related to tylenol and to NSAIDs. He is also concerned about maxalt as a cause.    He has noted ongoing fecal urgency/incontinence; he will have colonoscopy for microscopic colitis    Fell several weeks ago; he had abdominal pain--ED visit showed no abnormality on CT; but back pain started. He has continued Ptx, ice pack, chronic pain program. He has increased lyrica to 450 mg total per day.    Chronic LBP is stable    He has continued with enbrel; he will hold the drug around colonoscopy for 3 weeks.      Initial history September 12, 2023  Patient has a long history of low back pain. He recalls having to miss work a few times in his 20s due to pain. He had a microdiscectomy as far back as 2008 at L5-S1. He was known to carry HLA B27, but not diagnosed until 2018 when xray revealed signs of chronic inflammation and eburnation in the R SI joint. He has been on several medications that were effective, but had side effects including hives. He has bene on Enbrel for at least 4 years and feels like it is working. He notes that when he had to come off of his Enbrel in 2019 after he had a procedure for his hidradenitis suppurativa and recurrent Shingles (now on prophylactic Valtrex), he had significantly more pain. He continues to have midline back pain managed by the pain clinic. He is hoping to have another round of RFA soon to help manage these symptoms. Back pain is 2/10 today.     Patient has a number of other medical conditions. He has neck pain he attributes to injuries sustained " while sleep walking. He has been through several round of PTx and is now seeing podiatry for plantar fasciitis and Achilles tendinopathy. He had a sigmoidectomy in 2013 due to diverticulitis that recurred roughly every year. Since then, he has only had 2 flares of diverticulitis. He is now following GI for gastroparesis. Other past medical conditions include asthma, type 2 diabetes (A1C 5.5 on Ozempic), hyperlipidemia/hypertriglyceridemia, essential hypertension, POTS/autonomic dysfunction, hypothyroidism, LLOYD, REM sleep behavior disorder, anxiety, and depression.               Review of Systems:     Constitutional: negative  Skin: hx of hidradenitis suppurativa and recurrent shingles   Eyes: dry eyes requiring artificial tears; no history of uveitis  Ears/Nose/Throat: No hx of mucosal lesions  Respiratory: No shortness of breath, dyspnea on exertion, cough  Gastrointestinal: Hx of diverticulitis and sigmoidectomy per HPI; hx of gastroparesis, stool retention, and bowel incontinence; no blood in the stools or black, tarry stools  Genitourinary: No history of urinary incontinence and   Musculoskeletal: Per HPI  Neurologic: Some numbness in things 2/2 nerve block injection; hx of radiculopathy; no weakness  Psychiatric: Hx of psychosis said to be 2/2 autoimmune condition, now controlled         Active Problem List:     Patient Active Problem List    Diagnosis Date Noted    Lumbar radiculopathy 09/25/2024     Priority: Medium    Diabetic polyneuropathy associated with diabetes mellitus due to underlying condition (H) 09/16/2024     Priority: Medium    Therapeutic opioid induced constipation 11/16/2023     Priority: Medium    Anal fissure 11/09/2023     Priority: Medium    Altered bowel function 11/09/2023     Priority: Medium    Generalized abdominal pain 11/09/2023     Priority: Medium    Nausea 11/09/2023     Priority: Medium    Perianal abscess 11/09/2023     Priority: Medium    Hyperlipidemia LDL goal <70  04/06/2023     Priority: Medium    RBD (REM behavioral disorder) 09/30/2022     Priority: Medium    Small fiber neuropathy 09/12/2022     Priority: Medium    Folliculitis 12/31/2021     Priority: Medium    Immunosuppression 12/31/2021     Priority: Medium    Major depressive disorder, recurrent episode, mild 12/22/2021     Priority: Medium    Borderline personality disorder (H) 11/17/2021     Priority: Medium    Chronic pain syndrome 07/12/2021     Priority: Medium    Benign neoplasm of cecum 09/17/2020     Priority: Medium    Benign neoplasm of ascending colon 09/15/2020     Priority: Medium    Dysautonomia (H) 08/18/2020     Priority: Medium    PHN (postherpetic neuralgia) 07/15/2020     Priority: Medium    POTS (postural orthostatic tachycardia syndrome) 03/24/2020     Priority: Medium    Orthostatic dizziness 03/18/2020     Priority: Medium    Gastroparesis 01/24/2020     Priority: Medium    Digestive symptom 01/07/2020     Priority: Medium    Hypertriglyceridemia 12/16/2019     Priority: Medium    Ingrown toenail 07/03/2019     Priority: Medium    History of pulmonary embolism 01/28/2019     Priority: Medium    Peripheral polyneuropathy 01/23/2019     Priority: Medium    Type 2 diabetes mellitus with complication, without long-term current use of insulin (H) 12/24/2018     Priority: Medium    DDD (degenerative disc disease), lumbar 11/13/2018     Priority: Medium    Morbid obesity (H) 05/17/2018     Priority: Medium    Fatty infiltration of liver 05/17/2018     Priority: Medium    Ankylosing spondylitis of sacral region (H) 02/28/2018     Priority: Medium    Chronic, continuous use of opioids 04/04/2017     Priority: Medium     Patient is followed by Ksenia Lyles MD for ongoing prescription of pain medication.  All refills should only be approved by this provider, or covering partner.    Medication(s): oxy 5.   Maximum quantity per month: 40  Clinic visit frequency required: Q3  months     Controlled  substance agreement: 6/23/2020  UDS: Aug 2019    CSA -- Encounter Level on 04/04/2017:    Controlled Substance Agreement - Scan on 4/11/2017  1:30 PM: CONTROLLED SUBSTANCE AGREEMENT       CSA -- Patient Level:    Controlled Substance Agreement - Opioid - Scan on 8/9/2021  2:05 PM  Controlled Substance Agreement - Opioid - Scan on 6/23/2020  3:50 PM  Controlled Substance Agreement - Opioid - Scan on 6/12/2019  6:16 PM       Pain Clinic evaluation in the past: Yes    DIRE Total Score(s):  No flowsheet data found.    Last [facility] website verification:  12/2/2020   [email or web address]       Internal hemorrhoids 11/03/2016     Priority: Medium    Essential hypertension with goal blood pressure less than 140/90 11/01/2016     Priority: Medium    Anorectal disorder 10/12/2016     Priority: Medium    B12 deficiency 08/29/2016     Priority: Medium    Irritable bowel syndrome with diarrhea 08/19/2016     Priority: Medium    Chronic midline low back pain without sciatica 06/06/2016     Priority: Medium    Hemorrhage of rectum and anus 03/16/2016     Priority: Medium    Acquired hypothyroidism 10/08/2015     Priority: Medium    Facet arthritis of cervical region 10/06/2015     Priority: Medium    Intracranial arachnoid cyst 10/02/2015     Priority: Medium    LLOYD (obstructive sleep apnea)- mild (AHI 11) 01/31/2015     Priority: Medium     Study Date: 1/27/2015- (308.1 lbs)  Snoring was reported assoft to moderate.  Lowest oxygen saturation was 88.0%. Apnea/Hypopnea Index was 11.5 events per hour. REM was not seen.  The supine AHI is 12.7.  RDI was  25.7. PLM index was 0 per hour.  Sleep study 10/31/16 [facility] (292#) CPAP 8 cm effective      Generalized anxiety disorder 01/12/2015     Priority: Medium    GERD (gastroesophageal reflux disease)      Priority: Medium    History of colitis 04/16/2013     Priority: Medium     Formatting of this note might be different from the original. Overview: 12/19/11, 9/15/12, 2/17/13,  4/12/13      Dysphagia 07/18/2011     Priority: Medium    Chronic nonallergic rhinitis      Priority: Medium     RAST all NEGATIVE for environmental allergens, IgE= 6 (normal)      Intermittent asthma      Priority: Medium     Exercise-induced      Diverticulosis 09/01/2006     Priority: Medium     Recurrent diverticulitis, S/p sigmoid resction 8/2013              Past Medical History:     Past Medical History:   Diagnosis Date    Acne     Acquired hypothyroidism     Allergic state     Ankylosing spondylitis lumbar region (H)     Ankylosing spondylitis of sacral region (H)     Anxiety     Bipolar 2 disorder (H)     Chest pain     Chest pain, regulated w/BP meds. Clear arteries.    Chronic pain     Chronic, continuous use of opioids     DDD (degenerative disc disease), lumbar     Depressive disorder     Diabetes (H)     Diverticulosis     Dysautonomia (H)     Facet arthritis of cervical region     Gastroesophageal reflux disease     Hypertension     IBS (irritable bowel syndrome)     Intracranial arachnoid cyst     Lumbar radiculopathy     Major depressive disorder, recurrent episode     Multiple psych providers - they manage meds August 2015: Provigil induced severe mood dis-function     Major depressive disorder, recurrent episode, severe (H) 12/02/2020    MDD (major depressive disorder), recurrent severe, without psychosis (H) 07/21/2021    Mixed dyslipidemia 04/06/2023    Obese     LLOYD (obstructive sleep apnea)- mild (AHI 11)     Polyneuropathy     POTS (postural orthostatic tachycardia syndrome)     Pulmonary embolism (H)     Skin exam, screening for cancer 12/03/2013    Sleep apnea     Thrombosis     Uncomplicated asthma      Past Surgical History:   Procedure Laterality Date    BACK SURGERY  10/2007    lumbar discectomy L5-S1    BIOPSY  09/15/2020    Colon Adenomas x2    COLONOSCOPY      Note: colonoscopy scheduled with [facility] on Friday, 9/4/15    COSMETIC SURGERY  2012    Nose Exterior - functional    GI SURGERY   08/2013    Sigmoidectomy    HERNIA REPAIR, UMBILICAL  08/23/2011    small, Dr. Evan Beavers    INCISION AND DRAINAGE, ABSCESS, COMPLEX  08/23/2011    umbilical, Dr. Evan Beavers    LAMINECTOMY LUMBAR TWO LEVELS Bilateral 9/25/2024    Procedure: bilateral lumbar 4-lumbar 5 hemilaminectomies, medial facetectomies and foraminotomies with left lumbar microdiscectomy and lumbar 5-sacral 1 foraminotomies;  Surgeon: Anitha Biggs MD;  Location: [facility] OR    LAPAROSCOPIC ASSISTED COLECTOMY LEFT (DESCENDING)  08/15/2013    Procedure: LAPAROSCOPIC ASSISTED COLECTOMY LEFT (DESCENDING);  Laparoscopic Hand Assisted Sigmoid Resection, Mobilization of Splenic Fissure, coloproctoscopy, *Latex Free Room* Anesthesia General with Pain block  ;  Surgeon: Aurora Justice MD;  Location: [facility] OR    NERVE SURGERY  08/18/2011    RF ablation @ L3-S1 @ [facility]    RECONSTRUCT NOSE AND SEPTUM (FUNCTIONAL)  10/14/2011    Procedure:RECONSTRUCT NOSE AND SEPTUM (FUNCTIONAL); Functional Septorhinoplasty, Turbinate Reduction, ; Surgeon:CEDRIC CUEVAS; Location: OR    SINUS SURGERY  10/01/2001    ethmoidectomy chronic sinusitis    SOFT TISSUE SURGERY  4/7/2022    Hidradenitis Suppurativa surgery            Social History:     Social History     Socioeconomic History    Marital status: Single     Spouse name: Not on file    Number of children: Not on file    Years of education: Not on file    Highest education level: Not on file   Occupational History    Occupation:      Employer: [employer]    Occupation: paraprofessional     Comment: [employer]     Employer: [employer]   Tobacco Use    Smoking status: Never     Passive exposure: Never    Smokeless tobacco: Never   Vaping Use    Vaping status: Never Used   Substance and Sexual Activity    Alcohol use: Not Currently     Comment: occ 1/week    Drug use: No     Comment: synthetic THC for nausea    Sexual activity: Not Currently     Partners: Female     Birth  "control/protection: Condom, Pill   Other Topics Concern    Parent/sibling w/ CABG, MI or angioplasty before 65F 55M? Yes     Comment: 49yo brother survived a \"massive\" PE several months ago     Service Not Asked    Blood Transfusions Not Asked    Caffeine Concern Not Asked    Occupational Exposure Not Asked    Hobby Hazards Not Asked    Sleep Concern Yes    Stress Concern Yes    Weight Concern Not Asked    Special Diet Not Asked    Back Care Yes    Exercise Not Asked    Bike Helmet Not Asked    Seat Belt Yes    Self-Exams Not Asked   Social History Narrative    Not on file     Social Drivers of Health     Financial Resource Strain: Low Risk  (9/25/2024)    Financial Resource Strain     Within the past 12 months, have you or your family members you live with been unable to get utilities (heat, electricity) when it was really needed?: No   Food Insecurity: Low Risk  (9/25/2024)    Food Insecurity     Within the past 12 months, did you worry that your food would run out before you got money to buy more?: No     Within the past 12 months, did the food you bought just not last and you didn t have money to get more?: No   Transportation Needs: Low Risk  (9/25/2024)    Transportation Needs     Within the past 12 months, has lack of transportation kept you from medical appointments, getting your medicines, non-medical meetings or appointments, work, or from getting things that you need?: No   Physical Activity: Not on file   Stress: Not on file   Social Connections: Not on file   Interpersonal Safety: Low Risk  (9/25/2024)    Interpersonal Safety     Do you feel physically and emotionally safe where you currently live?: Yes     Within the past 12 months, have you been hit, slapped, kicked or otherwise physically hurt by someone?: No     Within the past 12 months, have you been humiliated or emotionally abused in other ways by your partner or ex-partner?: No   Housing Stability: Low Risk  (9/25/2024)    Housing " "Stability     Do you have housing? : Yes     Are you worried about losing your housing?: No          Family History:     Family History   Problem Relation Age of Onset    Musculoskeletal Disorder Mother         back    Anxiety Disorder Mother     Colon Polyps Mother     Ulcerative Colitis Mother         and ischemic small intestine, surgery    Hypertension Mother     Breast Cancer Mother     Osteoporosis Mother     Diabetes Mother         Type 2, Diagnosed in 2014    Depression Mother         Takes Cymbalta to help with chronic pain + depx    Thyroid Disease Mother         Hypothyroidism    Obesity Mother         Under much better control latter half of 2015    Musculoskeletal Disorder Father         back    Substance Abuse Father         Alcohol    Hypertension Father     Hyperlipidemia Father     Depression Father         Off meds for many years. Seems \"ok\"    Anxiety Disorder Father     Substance Abuse Sister         Alcohol    Depression Sister         Mental Health Therapist, yet no anti-depressants?    Anxiety Disorder Sister         Mental Health Therapist, yet no anti-anxiety meds?    Deep Vein Thrombosis (DVT) Sister         acssociated with OCP use    Musculoskeletal Disorder Brother         back    Depression Brother         Expressed as anger and moodiness    Substance Abuse Brother         Alcohol    Anxiety Disorder Brother     Pulmonary Embolism Brother     Heart Disease Maternal Grandmother     Heart Disease Maternal Grandfather     Psychotic Disorder Paternal Grandfather     Suicide Paternal Grandfather     Depression Paternal Grandfather         Pediatrician. Committed suicide by pistol in 1990.    Bladder Cancer Other     Colon Cancer No family hx of     Crohn's Disease No family hx of     Anesthesia Reaction No family hx of     Skin Cancer No family hx of     Bleeding Disorder No family hx of             Allergies:     Allergies   Allergen Reactions    Amoxicillin-Pot Clavulanate Difficulty " breathing    Banana Shortness Of Breath     Pt reports organic Banana is okay.     Clavulanic Acid Anaphylaxis     Other Reaction(s): Throat swelling    Nitroglycerin Palpitations    Penicillins Anaphylaxis    Provigil [Modafinil] Shortness Of Breath     headache    Gadolinium Hives and Itching     Patient was premedicated for the contrast allergy. He did still have a reaction a few hours after injection. Hives and itching. Dr. Gomez told tech to inform pt he should only have contrast again in the future when premedicated and at a hospital. Not at an outpatient facility.     Haemophilus B Polysaccharide Vaccine Rash     Quadrivalent, cell based    Influenza Virus Vaccine Rash     Quadrivalent, cell based    Ketoconazole      Topical cream caused swelling and itching    Dye [Contrast Dye] Other (See Comments) and Hives     Moderate flushing, CT contrast  Iodinated and gadolinium    Formoterol     Gabapentin      Other reaction(s): hives    Golimumab      Hives, bradycardia, face swelling    Mometasone     Naproxen      Other reaction(s): Bleeding Gums    Neurontin [Gabapentin] Hives     Moderate hives    Nortriptyline Hives    Nystatin Hives    Varicella Virus Vaccine Live      Rash    Adhesive Tape Rash    Baclofen Other (See Comments)     Cognitive changes    Cefdinir Blisters, Dermatitis, Difficulty breathing, Other (See Comments) and Rash    Flagyl [Metronidazole Hcl] Palpitations and Hives    Latex Rash    Metronidazole Palpitations, Other (See Comments) and Rash     dizziness (versus ciprofloxacin taken at same time)    Sulfamethizole Rash     Other Reaction(s): Rash            Medications:     Current Outpatient Medications   Medication Sig Dispense Refill    acetaminophen 500 MG CAPS Take 500 mg by mouth every morning.      albuterol (PROAIR HFA/PROVENTIL HFA/VENTOLIN HFA) 108 (90 Base) MCG/ACT inhaler Inhale 2 puffs into the lungs every 6 hours as needed for shortness of breath, wheezing or cough 90  "day supply 25.5 g 3    B-D LUER-LUCILLE SYRINGE 25G X 1\" 3 ML MISC USE WITH B12 INJECTIONS 12 each 0    bisacodyl (DULCOLAX) 5 MG EC tablet Take 10 mg by mouth as needed for constipation      buPROPion (WELLBUTRIN XL) 300 MG 24 hr tablet Take 1 tablet by mouth daily      cholecalciferol (D3-50) 1250 mcg (99561 units) capsule TAKE ONE CAPSULE BY MOUTH EVERY 2 WEEKS 24 capsule 0    clonazePAM (KLONOPIN) 0.5 MG tablet Take 1 mg by mouth at bedtime. And 0.5mg daily as needed. Uses at bedtime for RBD      cyanocobalamin (CYANOCOBALAMIN) 1000 mcg/mL injection INJECT 1ML INTO THE MUSCLE EVERY 30 DAYS 3 mL 2    diphenhydrAMINE (BENADRYL) 25 MG capsule Take 25 mg by mouth once a week Before Enbrel injection      divalproex sodium delayed-release (DEPAKOTE) 125 MG DR tablet Take 125 mg by mouth every evening.      docusate sodium (COLACE) 50 MG capsule Take 100 mg by mouth 2 times daily      DULoxetine (CYMBALTA) 60 MG capsule Take 120 mg by mouth daily       EPINEPHrine (ANY BX GENERIC EQUIV) 0.3 MG/0.3ML injection 2-pack Inject 0.3 mLs (0.3 mg) into the muscle as needed for anaphylaxis May repeat one time in 5-15 minutes if response to initial dose is inadequate. 2 each 3    eszopiclone (LUNESTA) 3 MG tablet Take 3 mg by mouth At Bedtime       etanercept (ENBREL SURECLICK) 50 MG/ML autoinjector Inject 50 mg subcutaneously every 7 days. 4 mL 5    etanercept (ENBREL SURECLICK) 50 MG/ML autoinjector Inject 50 mg Subcutaneous once a week . Hold for signs of infection, and seek medical attention. 4 mL 7    famotidine (PEPCID) 20 MG tablet Take 20 mg by mouth daily      fenofibrate (TRICOR) 48 MG tablet TAKE ONE TABLET BY MOUTH ONCE DAILY 90 tablet 2    fexofenadine (ALLEGRA) 180 MG tablet Take 180 mg by mouth every evening.      fluticasone (FLONASE) 50 MCG/ACT nasal spray Spray 2 sprays in nostril every evening.      fluticasone-vilanterol (BREO ELLIPTA) 100-25 MCG/ACT inhaler Inhale 1 puff into the lungs daily. 3 each 3    " "ketotifen fumarate 0.035% 0.035 % SOLN ophthalmic solution Place 1 drop into both eyes daily as needed for itching.      levothyroxine (SYNTHROID/LEVOTHROID) 75 MCG tablet TAKE ONE TABLET BY MOUTH EVERY MORNING 90 tablet 0    lidocaine (XYLOCAINE) 5 % external ointment Apply topically 2 times daily as needed for moderate pain. 50 g 5    MAGNESIUM GLYCINATE PO Take 400 mg by mouth daily.      Melatonin 10 MG TABS tablet Take 10 mg by mouth at bedtime      methocarbamol (ROBAXIN) 500 MG tablet TAKE 1-2 TABLETS BY MOUTH FOUR TIMES DAILY AS NEEDED FOR MUSCLE SPASMS 240 tablet 0    metoclopramide (REGLAN) 5 MG tablet Take 5 mg by mouth 4 times daily as needed.      metoprolol succinate ER (TOPROL XL) 100 MG 24 hr tablet TAKE 1 TABLET BY MOUTH IN THE EVENING; TO BE USED WITH 200MG IN MORNING 90 tablet 2    metoprolol succinate ER (TOPROL XL) 200 MG 24 hr tablet TAKE 1 TABLET BY MOUTH TWICE DAILY 180 tablet 0    montelukast (SINGULAIR) 10 MG tablet TAKE ONE TABLET BY MOUTH EVERY EVENING 90 tablet 3    MOUNJARO 2.5 MG/0.5ML SOAJ Inject 0.5 mLs (2.5 mg) subcutaneously every 7 days. 2 mL 0    nabumetone (RELAFEN) 500 MG tablet TAKE 1 TO 2 TABLETS BY MOUTH TWICE DAILY AS NEEDED FOR MODERATE PAIN 120 tablet 0    naloxone (NARCAN) 4 MG/0.1ML nasal spray Spray 4 mg into one nostril alternating nostrils as needed for opioid reversal every 2-3 minutes until assistance arrives. This medication is an antidote and stays on the chart as an \"as needed\" medication      omega-3 acid ethyl esters (LOVAZA) 1 g capsule TAKE TWO CAPSULES BY MOUTH TWICE DAILY 360 capsule 2    omeprazole (PRILOSEC) 20 MG DR capsule Take 20 mg by mouth daily.      OnabotulinumtoxinA (BOTOX IJ)       oxyCODONE (ROXICODONE) 5 MG tablet Take 1 tablet (5 mg) by mouth every 6 hours as needed for breakthrough pain. Ok to fill 12/30/24 to begin using on 01/01/25. 30 days supply for chronic pain 60 tablet 0    pregabalin (LYRICA) 150 MG capsule TAKE ONE CAPSULE BY MOUTH " THREE TIMES DAILY 270 capsule 0    pseudoePHEDrine-guaiFENesin (MUCINEX D)  MG TB12 Take 1 tablet by mouth 2 times daily as needed (congestion).      pyridostigmine (MESTINON) 60 MG tablet Take 1 tablet by mouth 3 times daily      ramipril (ALTACE) 10 MG capsule TAKE ONE CAPSULE BY MOUTH ONCE DAILY 90 capsule 0    riboflavin 100 MG CAPS Take 200 mg by mouth daily Two hundred milligrams nightly to prevent migraine      risperiDONE (RISPERDAL) 0.5 MG tablet Take 0.5 mg by mouth 2 times daily as needed (aggitation).      rosuvastatin (CRESTOR) 40 MG tablet TAKE ONE TABLET BY MOUTH ONCE DAILY 90 tablet 2    sodium chloride 0.9 % neb solution Take 3 mLs by nebulization every 3 hours as needed      sodium fluoride 1.1 % CREA At Bedtime      triamcinolone (KENALOG) 0.1 % external cream Apply topically 2 times daily as needed for irritation. Apply for up to 2 weeks per month on hands      UBRELVY 100 MG tablet Take 100 mg by mouth at onset of headache      valACYclovir (VALTREX) 500 MG tablet Take 1 tablet (500 mg) by mouth daily. 90 tablet 2    vitamin B complex with vitamin C (STRESS TAB) tablet Take 1 tablet by mouth daily      ZINC SULFATE-VITAMIN C MT Take 1 tablet by mouth daily              Physical Exam:   Blood pressure 120/85, pulse 82, resp. rate 20, weight 133.8 kg (295 lb), SpO2 99%.  Wt Readings from Last 6 Encounters:   01/16/25 133.8 kg (295 lb)   01/16/25 133 kg (293 lb 4.8 oz)   11/07/24 132.5 kg (292 lb)   10/31/24 132.9 kg (293 lb)   09/25/24 128.4 kg (283 lb)   09/16/24 130.6 kg (288 lb)     Body mass index is 35.91 kg/m .  Constitutional: well-developed, appearing stated age; cooperative  Eyes: nl EOM, PERRLA, conjunctiva, sclera  ENT: nl external ears, nose, hearing, lips, teeth, gums, throat; no mucous membrane lesions, normal saliva pool  Neck: no visible mass or thyroid enlargement  Resp: normal effort at rest  CV: no edema  Lymph: no cervical, supraclavicular, inguinal or epitrochlear  nodes  MS: Limited spinal mobility on side bend (L>R). Able to touch toes on forward bend and to reverse lumbar lordosis. Heels and occiput to wall distance 0 cm. The neck, shoulder, elbow, wrist, MCP/PIP/DIP, knee, ankle, and foot MTP/IP joints were examined and found normal. No active synovitis or altered joint anatomy. Normal  strength.  Skin: no nail pitting, alopecia, rash, nodules or lesions  Neuro: grossly nl cranial nerves; voice is hoarse   Psych: nl judgement, orientation, memory, affect.         Data:     @      Latest Ref Rng & Units 9/26/2024     5:28 AM 10/30/2024     1:23 PM 12/31/2024     3:06 PM   RHEUM RESULTS   Albumin 3.5 - 5.2 g/dL  4.4     ALT 0 - 70 U/L  18     AST 0 - 45 U/L  22     Creatinine 0.67 - 1.17 mg/dL 0.88  1.11  1.02    CRP Inflammation <5.00 mg/L  <3.00     GFR Estimate >60 mL/min/1.73m2 >90  80  89    Hematocrit 40.0 - 53.0 % 37.3  44.2  43.7    Hemoglobin 13.3 - 17.7 g/dL 12.7  14.6  15.0    WBC 4.0 - 11.0 10e3/uL 14.7  9.3  6.6    RBC Count 4.40 - 5.90 10e6/uL 3.96  4.65  4.76    RDW 10.0 - 15.0 % 12.5  12.9  12.7    MCHC 31.5 - 36.5 g/dL 34.0  33.0  34.3    MCV 78 - 100 fL 94  95  92    Platelet Count 150 - 450 10e3/uL 201  194  208    Sed Rate 0 - 20 mm/hr  10         Rheumatoid Factor   Date Value Ref Range Status   08/07/2015 <20 <20 IU/mL Final   ,  ,  ,   Cyclic Citrullinated Peptide IgG   Date Value Ref Range Status   08/07/2009 <2  Interpretation:  Negative <5 U/mL Final   ,  ,  ,  ,   DANIS Screen by EIA   Date Value Ref Range Status   09/03/2014 <1.0  Interpretation:  Negative   <1.0 Final   ,  ,  ,  ,  ,  ,  ,   Hepatitis B Core Cindy   Date Value Ref Range Status   02/28/2018 Nonreactive NR^Nonreactive Final   ,   Hep B Surface Agn   Date Value Ref Range Status   02/28/2018 Nonreactive NR^Nonreactive Final   ,  ,  ,  ,   Quantiferon-TB Gold Plus   Date Value Ref Range Status   10/19/2022 Negative Negative Final     Comment:     No interferon gamma response to  M.tuberculosis antigens was detected. Infection with M.tuberculosis is unlikely, however a single negative result does not exclude infection. In patients at high risk for infection, a second test should be considered in accordance with the 2017 ATS/IDSA/CDC Clinical Pract  ice Guidelines for Diagnosis of Tuberculosis in Adults and Children      TB1 Ag minus Nil Value   Date Value Ref Range Status   10/19/2022 0.00 IU/mL Final     TB2 Ag minus Nil Value   Date Value Ref Range Status   10/19/2022 0.00 IU/mL Final     Mitogen minus Nil Result   Date Value Ref Range Status   10/19/2022 9.96 IU/mL Final     Nil Result   Date Value Ref Range Status   10/19/2022 0.04 IU/mL Final   ,  ,  ,  ,  ,  ,  ,  ,   Proteinase 3 Antibody IgG   Date Value Ref Range Status   04/03/2019 <0.2 0.0 - 0.9 AI Final     Comment:     Negative  Antibody index (AI) values reflect qualitative changes in antibody   concentration that cannot be directly associated with clinical condition or   disease state.     ,   Myeloperoxidase Antibody IgG   Date Value Ref Range Status   04/03/2019 <0.2 0.0 - 0.9 AI Final     Comment:     Negative  Antibody index (AI) values reflect qualitative changes in antibody   concentration that cannot be directly associated with clinical condition or   disease state.     ,  ,  ,  ,  ,  ,  ,   Albumin Fraction   Date Value Ref Range Status   02/22/2019 4.7 3.7 - 5.1 g/dL Final     Alpha 2 Fraction   Date Value Ref Range Status   02/22/2019 0.6 0.5 - 0.9 g/dL Final     Beta Fraction   Date Value Ref Range Status   02/22/2019 0.9 0.6 - 1.0 g/dL Final     Gamma Fraction   Date Value Ref Range Status   02/22/2019 0.9 0.7 - 1.6 g/dL Final     Monoclonal Peak   Date Value Ref Range Status   02/22/2019 0.0 0.0 g/dL Final     ELP Interpretation:   Date Value Ref Range Status   02/22/2019   Final    Essentially normal electrophoretic pattern. No monoclonal protein seen. Pathologic   significance requires clinical correlation.   JOYCE Smith M.D., Ph.D., Pathologist.         ,  ,   IGA   Date Value Ref Range Status   07/11/2016 188 70 - 380 mg/dL Final

## 2025-01-15 NOTE — PROGRESS NOTES
[facility]                                     Progress Note    Patient Name: Joel Pineda  Date: January 15, 2025            Service Type: Individual      Session Start Time: 2:00 PM Session End Time: 2:54 PM       Session Length: 54 minutes    Session #: 10    Attendees: Client attended alone    Service Modality:  Video Visit:      Provider verified identity through the following two step process.  Patient provided:  Patient photo and Patient is known previously to provider    Telemedicine Visit: The patient's condition can be safely assessed and treated via synchronous audio and visual telemedicine encounter.      Reason for Telemedicine Visit: Patient has requested telehealth visit    Originating Site (Patient Location): Patient's home    Distant Site (Provider Location): Provider Remote Setting- Home Office    Consent:  The patient/guardian has verbally consented to: the potential risks and benefits of telemedicine (video visit) versus in person care; bill my insurance or make self-payment for services provided; and responsibility for payment of non-covered services.     Patient would like the video invitation sent by:  Text to cell phone: [phone]    Mode of Communication:  [facility]    Distant Location (Provider):  Off-site    As the provider I attest to compliance with applicable laws and regulations related to telemedicine.    DATA  Interactive Complexity: Yes, visit entailed Interactive Complexity evidenced by: Patient's presenting concerns require more intensive intervention than could be completed within the usual service. Provider used clinical judgment in determining the necessary length for the session to benefit the patient's needs.     Crisis: No        Progress Since Last Session (Related to Symptoms / Goals / Homework):   Symptoms: No change reports while he has had passive thoughts of suicide, he has been better able to use distraction and  self-care.    Homework: Achieved / completed to satisfaction - engaged in social activity with friend.      Episode of Care Goals: Satisfactory progress - CONTEMPLATION (Considering change and yet undecided); Intervened by assessing the negative and positive thinking (ambivalence) about behavior change     Current / Ongoing Stressors and Concerns:   Tito will be starting biofeedback through [facility] for bowel incontinence. 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 He has been working on identifying ground rules for Board Meetings. Continues to report feeling helpless and hopeless, ongoing passive thoughts of suicide - tends to be triggered to 'intense depths of loneliness.' Explored origins of suicidal ideation.          Treatment Objective(s) Addressed in This Session:   use relaxation strategies 3 times per day to reduce the physical symptoms of anxiety  state understanding of stressors and relationship to physical symptoms  Feel less tired and more energy during the day   Identify negative self-talk and behaviors: challenge core beliefs, myths, and actions  practice one mindfulness skill each day for 15 minutes      Intervention:      CBT: cognitive challenging, restructuring, reframing Behavioral activation techniques, grounding strategies  PCT: supportive autonomy, unconditional positive regard, empathic reflection, active listening  DBT: coping ahead, self-soothing with the 5 senses    Assessments completed prior to visit:  The following assessments were completed by patient for this visit:  The following assessments were completed by patient for this visit:  PHQ9:       10/20/2024     2:17 PM 10/29/2024     4:30 PM 12/3/2024    12:12 PM 12/10/2024     2:20 PM 12/17/2024     2:51 PM 1/1/2025     3:48 PM 1/14/2025     9:37 AM   PHQ-9 SCORE   PHQ-9 Total Score [facility] 7 (Mild depression) 14 (Moderate depression) 10  (Moderate depression) 10 (Moderate depression) 15 (Moderately severe depression) 8 (Mild depression) 10 (Moderate depression)   PHQ-9 Total Score 7 14  10  10  15  8  10        Patient-reported     GAD7:       4/22/2024    10:44 AM 7/18/2024     9:53 AM 8/1/2024     9:28 AM 10/20/2024     2:25 PM 12/3/2024    12:14 PM 12/17/2024     2:51 PM 1/1/2025     3:50 PM   YUDI-7 SCORE   Total Score 8 (mild anxiety) 11 (moderate anxiety) 7 (mild anxiety) 7 (mild anxiety) 9 (mild anxiety) 12 (moderate anxiety) 8 (mild anxiety)   Total Score 8 11 7 7 9  12  8        Patient-reported     PROMIS 10-Global Health (only subscores and total score):       4/22/2024    10:45 AM 7/18/2024     9:55 AM 8/1/2024     9:29 AM 8/17/2024     1:12 PM 10/20/2024     2:19 PM 11/6/2024    12:19 PM 1/15/2025    12:04 PM   PROMIS-10 Scores Only   Global Mental Health Score 10 8 7 7    7 8 7  8    Global Physical Health Score 9 11 12 12    12 11 11  13    PROMIS TOTAL - SUBSCORES 19 19 19 19    19 19 18  21        Patient-reported     [place] Suicide Severity Rating Scale (Short Version)      1/19/2022     1:00 PM 1/26/2022     1:00 PM 2/2/2022     1:00 PM 2/9/2022     1:00 PM 2/16/2022     1:00 PM 5/5/2023     2:41 PM 9/25/2024     9:55 AM   [place] Suicide Severity Rating (Short Version)   Over the past 2 weeks have you felt down, depressed, or hopeless? yes yes yes yes yes no    Over the past 2 weeks have you had thoughts of killing yourself? no no no no no no    Have you ever attempted to kill yourself? no no no no no no    Q1 Wished to be Dead (Past Month)       0-->no   Q2 Suicidal Thoughts (Past Month)       0-->no   Q6 Suicide Behavior (Lifetime)       0-->no   Level of Risk per Screen       no risks indicated            ASSESSMENT: Current Emotional / Mental Status (status of significant symptoms):   Risk status (Self / Other harm or suicidal ideation)   Patient reports the following current fears or concerns for personal safety: Ongoing  passive thoughts of suicide with absence of plan or intent and agreed to follow his previously developed [facility] Safety Plan.   Patient denies current or recent suicidal ideation or behaviors.   Patient denies current or recent homicidal ideation or behaviors.   Patient denies current or recent self injurious behavior or ideation.   Patient denies other safety concerns.   Patient reports there has been no change in risk factors since their last session.     Patient reports there has been no change in protective factors since their last session.     A safety and risk management plan has been developed including: Patient consented to co-developed safety plan on 10/21/2024.  Safety and risk management plan was reviewed.   Patient agreed to use safety plan should any safety concerns arise.  A copy was made available to the patient.     Appearance:   Appropriate    Eye Contact:   Fair    Psychomotor Behavior: Normal    Attitude:   Cooperative    Orientation:   All   Speech    Rate / Production: Normal     Volume:  Normal    Mood:    Depressed  Sad    Affect:    Appropriate    Thought Content:  Clear    Thought Form:  Coherent  Logical    Insight:    Good      Medication Review:   Changes to psychiatric medications, see updated Medication List in EPIC.  Wellbutrin increased to 450mg.     Medication Compliance:   Yes     Changes in Health Issues:   None reported     Chemical Use Review:   Substance Use: Chemical use reviewed, no active concerns identified      Tobacco Use: No current tobacco use.      Diagnosis:  296.32 (F33.1) Major Depressive Disorder, Recurrent Episode, Moderate _.  300.02 (F41.1) Generalized Anxiety Disorder.    Collateral Reports Completed:   Not Applicable    PLAN: (Patient Tasks / Therapist Tasks / Other)    Patient to continue to use self-soothing and cope ahead skills.     Patient and provider will continue practicing how to reframe automatic thinking from negative situations.     Patient  will practice radical self-compassion techniques to decrease shame.     Shirley Bartlett, Ha LP                                                         ______________________________________________________________________    Individual Treatment Plan    Patient's Name: Joel Pineda  YOB: 1975    Date of Creation: October 30, 2024   Date Treatment Plan Last Reviewed/Revised: October 30, 2024     DSM5 Diagnoses: 296.32 (F33.1) Major Depressive Disorder, Recurrent Episode, Moderate _ or 300.02 (F41.1) Generalized Anxiety Disorder  Psychosocial / Contextual Factors: recent surgery and resulting acute pain, chronic pain, chronic mental health concerns, mobility limitations  PROMIS (reviewed every 90 days):   PROMIS-10 Scores        10/20/2024     2:19 PM 11/6/2024    12:19 PM 1/15/2025    12:04 PM   PROMIS-10 Total Score w/o Sub Scores   PROMIS TOTAL - SUBSCORES 19 18  21        Patient-reported        Referral / Collaboration:  Referral to another professional/service is not indicated at this time..    Anticipated number of session for this episode of care: 9-12 sessions  Anticipation frequency of session: Weekly  Anticipated Duration of each session: 53 or more minutes  Treatment plan will be reviewed in 90 days or when goals have been changed.       MeasurableTreatment Goal(s) related to diagnosis / functional impairment(s)  Goal 1: Patient will decrease anxiety by 75% as evidenced by YUDI-7    I will know I've met my goal when I am at peace with where I'm at.       Objective #A (Patient Action)                          Patient will identify the situations / thoughts that contribute to feeling anxious.  Status: New - Date: 10/30/2024       Intervention(s)  Therapist will process anxiety-proving emotions.     Objective #B  Patient will use at least 3 coping skills for anxiety management in the next 4 weeks.  Status: New - Date: 10/30/2024       Intervention(s)  Therapist will assign homework : coping  skills practice to decrease anxiety .     Objective #C  Patient will use cognitive strategies identified in therapy to challenge anxious thoughts.  Status: New - Date: 10/30/2024       Intervention(s)  Therapist will  teach the patient ways to reframe negative core beliefs that contribute to anxiety.     Goal 2 Patient will report pain levels are consistently rated at 2/10 per patient report.   I will know I've met my goal when my pain is less disruptive.      Objective #A (Patient Action)    Patient will identify 5 strategies for managing pain.  Status: New - Date: 10/30/2024      Intervention(s)  Therapist will teach  strategies to manage pain and assign homework to use 3 strategies daily .    Objective #B  Patient will  engage in physical activity and PT exercises 2-3 times daily .  Status: New - Date: 10/30/2024      Intervention(s)  Therapist will assign homework to engage in at minimum one PT or physical activity daily .    Objective #C  Patient will Identify negative self-talk and behaviors: challenge core beliefs, myths, and actions.  Status: New - Date: 10/30/2024      Intervention(s)  Therapist will teach non-judgmental stance and help patient reframe negative self-talk  .      Goal 3: Patient will report reduction in depressive symptoms as evidenced by PHQ-9 score reduction of 75% or greater.     I will know I've met my goal when I feel like I have purpose in life.      Objective #A (Patient Action)    Status: New - Date: 10/20/2024      Patient will Decrease frequency and intensity of feeling down, depressed, hopeless.    Intervention(s)  Therapist will teach emotional recognition/identification. Therapist will reinforce use of emotion regulation skills .    Objective #B  Patient will use healthy communication when describing his emotions or when setting boundaries with others.     Status: New - Date: 10/30/2024      Intervention(s)  Therapist will teach Therapist will teach emotional regulation skills  and interpersonal effectiveness skills to improve quality of communication.     Objective #C  Patient will track and record at least 3 pleasant exchanges with family members such as mother, father .  Status: New - Date: 10/30/2024      Intervention(s)  Therapist will teach about healthy boundaries. Therapist will encourage patient to focus on positive interactions with family and use cope ahead to increase likelihood of pleasant experience as an outcome .    Patient has reviewed and agreed to the above plan.      Shirley Bartlett PsyD LP  October 30, 2024   Answers submitted by the patient for this visit:  Patient Health Questionnaire (Submitted on 12/3/2024)  If you checked off any problems, how difficult have these problems made it for you to do your work, take care of things at home, or get along with other people?: Very difficult  PHQ9 TOTAL SCORE: 10  Patient Health Questionnaire (G7) (Submitted on 12/3/2024)  YUDI 7 TOTAL SCORE: 9    Answers submitted by the patient for this visit:  Patient Health Questionnaire (Submitted on 12/17/2024)  If you checked off any problems, how difficult have these problems made it for you to do your work, take care of things at home, or get along with other people?: Very difficult  PHQ9 TOTAL SCORE: 15  Patient Health Questionnaire (G7) (Submitted on 12/17/2024)  YUDI 7 TOTAL SCORE: 12    Answers submitted by the patient for this visit:  Patient Health Questionnaire (Submitted on 1/14/2025)  If you checked off any problems, how difficult have these problems made it for you to do your work, take care of things at home, or get along with other people?: Somewhat difficult  PHQ9 TOTAL SCORE: 10

## 2025-01-16 ENCOUNTER — OFFICE VISIT (OUTPATIENT)
Dept: RHEUMATOLOGY | Facility: CLINIC | Age: 50
End: 2025-01-16
Payer: MEDICARE

## 2025-01-16 ENCOUNTER — OFFICE VISIT (OUTPATIENT)
Dept: NEUROSURGERY | Facility: CLINIC | Age: 50
End: 2025-01-16
Payer: MEDICARE

## 2025-01-16 VITALS
SYSTOLIC BLOOD PRESSURE: 121 MMHG | WEIGHT: 293.3 LBS | OXYGEN SATURATION: 97 % | HEART RATE: 83 BPM | HEIGHT: 76 IN | DIASTOLIC BLOOD PRESSURE: 69 MMHG | BODY MASS INDEX: 35.72 KG/M2

## 2025-01-16 VITALS
DIASTOLIC BLOOD PRESSURE: 85 MMHG | HEART RATE: 82 BPM | OXYGEN SATURATION: 99 % | RESPIRATION RATE: 20 BRPM | SYSTOLIC BLOOD PRESSURE: 120 MMHG | WEIGHT: 295 LBS | BODY MASS INDEX: 35.91 KG/M2

## 2025-01-16 DIAGNOSIS — M79.18 MYOFASCIAL MUSCLE PAIN: ICD-10-CM

## 2025-01-16 DIAGNOSIS — G89.29 CHRONIC INTRACTABLE PAIN: ICD-10-CM

## 2025-01-16 DIAGNOSIS — M45.9 ANKYLOSING SPONDYLITIS, UNSPECIFIED SITE OF SPINE (H): ICD-10-CM

## 2025-01-16 DIAGNOSIS — Z98.890 S/P LUMBAR LAMINECTOMY: Primary | ICD-10-CM

## 2025-01-16 DIAGNOSIS — M05.9 SEROPOSITIVE RHEUMATOID ARTHRITIS (H): ICD-10-CM

## 2025-01-16 PROCEDURE — 99213 OFFICE O/P EST LOW 20 MIN: CPT | Performed by: SURGERY

## 2025-01-16 ASSESSMENT — PAIN SCALES - GENERAL
PAINLEVEL_OUTOF10: MILD PAIN (3)
PAINLEVEL_OUTOF10: MILD PAIN (3)

## 2025-01-16 NOTE — NURSING NOTE
Chief Complaint   Patient presents with    RECHECK     Ankylosing spondylitis, unspecified site of spine       Vitals:    01/16/25 1411   BP: 120/85   BP Location: Left arm   Patient Position: Sitting   Cuff Size: Adult Large   Pulse: 82   Resp: 20   SpO2: 99%   Weight: 133.8 kg (295 lb)       Body mass index is 35.91 kg/m .      Zion Wick MA

## 2025-01-16 NOTE — PROGRESS NOTES
"NEUROSURGERY FOLLOW UP  NOTE    Joel Pineda comes today in follow-up.   bilateral lumbar 4-lumbar 5 hemilaminectomies, medial facetectomies and foraminotomies with left lumbar microdiscectomy and lumbar 5-sacral 1 foraminotomies  9/25/24.    At 6 week last visit, he was overall doing well with improve symptoms. In the meantime, he developed fecal incontinence. It did stop. Has chronic widespread stiffness. He has a diagnosis of ankylosis spondylitis.     He is continuing physical therpay.     PHYSICAL EXAM:   Constitutional: /69   Pulse 83   Ht 6' 4\" (1.93 m)   Wt 293 lb 4.8 oz (133 kg)   SpO2 97%   BMI 35.70 kg/m       Mental Status: A & O in no acute distress.  Affect is appropriate.  Speech is fluent.  Recent and remote memory are intact.  Attention span and concentration are normal.     IMAGING:   I personally reviewed all radiographic images        CONSULTATION ASSESSMENT AND PLAN:    ***  No significant central stenosis. Reviewed his MRI. Overall lateral recess appear improved at lumbar lumbar 4-5.     He will follow up with GI regarding bowel dysfunction.     Anitha Biggs MD      CC:     Ksenia Lyles  [address] [facility] [address]  "

## 2025-01-16 NOTE — NURSING NOTE
"Joel Pineda is a 49 year old male who presents for:  Chief Complaint   Patient presents with    RECHECK     F/U pain lower back to the right butt. Numbness of the ball of the right foot. Pain lvl 3.        Initial Vitals:  /69   Pulse 83   Ht 6' 4\" (1.93 m)   Wt 293 lb 4.8 oz (133 kg)   SpO2 97%   BMI 35.70 kg/m   Estimated body mass index is 35.7 kg/m  as calculated from the following:    Height as of this encounter: 6' 4\" (1.93 m).    Weight as of this encounter: 293 lb 4.8 oz (133 kg).. Body surface area is 2.67 meters squared. BP completed using cuff size: large  Mild Pain (3)    Nursing Comments:       Kelly Bassett    "

## 2025-01-16 NOTE — LETTER
"1/16/2025      Joel Pineda  [address] [place] [address]      Dear Colleague,    Thank you for referring your patient, Joel Pineda, to the [facility]. Please see a copy of my visit note below.    NEUROSURGERY FOLLOW UP  NOTE    Joel Pineda comes today in follow-up. Patient is a 50 yo male who is status post bilateral lumbar 4-lumbar 5 hemilaminectomies, medial facetectomies and foraminotomies with left lumbar microdiscectomy and lumbar 5-sacral 1 foraminotomies on  9/25/24.    At 6 week last visit, he was overall doing well with improve symptoms. In the meantime, he developed fecal incontinence. It did stop. Has chronic widespread stiffness. He has a diagnosis of ankylosis spondylitis.     He is continuing with physical therpay.     PHYSICAL EXAM:   Constitutional: /69   Pulse 83   Ht 6' 4\" (1.93 m)   Wt 293 lb 4.8 oz (133 kg)   SpO2 97%   BMI 35.70 kg/m       Mental Status: A & O in no acute distress.  Affect is appropriate.  Speech is fluent.  Recent and remote memory are intact.  Attention span and concentration are normal.     IMAGING:   I personally reviewed all radiographic images        CONSULTATION ASSESSMENT AND PLAN:     Reviewed his MRI. Overall lateral recess appear improved at lumbar 4-5. No significant central stenosis which would attribute to bowel dysfunction. He will follow up with GI regarding bowel dysfunction. Continue physical therapy as tolerated. Return if symptoms do not improve.     Anitha Biggs MD      CC:     Ksenia Lyles  [address] [facility] [address]      Again, thank you for allowing me to participate in the care of your patient.        Sincerely,        Anitha Biggs MD    Electronically signed"

## 2025-01-16 NOTE — PATIENT INSTRUCTIONS
Diagnosis:  1.  Ankylosing spondylitis with sacroiliitis: Range of motion in the neck and lumbar spine is generally well-preserved.  I recommend continuing Enbrel  2.  Migraine headaches: Improved with ubrelvy  3.  Posttraumatic low back pain: Gradually improving after bar surgery, physical therapy, exercise, and chronic pain regimen.  4.  History of microscopic colitis: Continuing follow-up with gastroenterology.    Plan:  1.  Continue etanercept 50 mg subcutaneous injected once weekly.  2.  Metabolic panel, CBC every 6 months.

## 2025-01-21 ENCOUNTER — MYC REFILL (OUTPATIENT)
Dept: PALLIATIVE MEDICINE | Facility: OTHER | Age: 50
End: 2025-01-21
Payer: MEDICARE

## 2025-01-21 DIAGNOSIS — M45.8 ANKYLOSING SPONDYLITIS OF SACRAL REGION (H): ICD-10-CM

## 2025-01-21 DIAGNOSIS — M47.816 LUMBAR FACET ARTHROPATHY: ICD-10-CM

## 2025-01-21 DIAGNOSIS — G43.111 INTRACTABLE MIGRAINE WITH AURA WITH STATUS MIGRAINOSUS: ICD-10-CM

## 2025-01-21 DIAGNOSIS — G89.29 CHRONIC INTRACTABLE PAIN: ICD-10-CM

## 2025-01-21 DIAGNOSIS — G62.9 PERIPHERAL POLYNEUROPATHY: ICD-10-CM

## 2025-01-22 ENCOUNTER — TELEPHONE (OUTPATIENT)
Dept: NEUROSURGERY | Facility: CLINIC | Age: 50
End: 2025-01-22
Payer: MEDICARE

## 2025-01-22 ENCOUNTER — VIRTUAL VISIT (OUTPATIENT)
Dept: PSYCHOLOGY | Facility: CLINIC | Age: 50
End: 2025-01-22
Payer: MEDICARE

## 2025-01-22 DIAGNOSIS — F41.1 GENERALIZED ANXIETY DISORDER: ICD-10-CM

## 2025-01-22 DIAGNOSIS — F33.1 MAJOR DEPRESSIVE DISORDER, RECURRENT EPISODE, MODERATE (H): Primary | ICD-10-CM

## 2025-01-22 RX ORDER — OXYCODONE HYDROCHLORIDE 5 MG/1
5 TABLET ORAL EVERY 6 HOURS PRN
Qty: 60 TABLET | Refills: 0 | Status: SHIPPED | OUTPATIENT
Start: 2025-01-22

## 2025-01-22 NOTE — TELEPHONE ENCOUNTER
Received call from patient requesting refill(s) of     OXYCODONE HCL 5 MG PO TABS          Last dispensed from pharmacy on: Dec. 30, 2024     Patient's last office/virtual visit by prescribing provider on: Oct. 31, 2024   Next office/virtual appointment scheduled for: Jan. 27, 2025       UDT: Apr. 8, 2024   CSA: Apr. 9, 2024       E-prescribe to    [id number] [place], [place] - [address] [address]      Will route to nursing Wyarno for review and preparation of prescription(s).

## 2025-01-22 NOTE — TELEPHONE ENCOUNTER
Medication refill information reviewed.     Due date for oxyCODONE (ROXICODONE) 5 MG tablet  is 01/31/25     Prescriptions prepped for review.     Will route to provider.

## 2025-01-22 NOTE — TELEPHONE ENCOUNTER
[facility] Call Center    Phone Message    May a detailed message be left on voicemail: yes     Reason for Call: Other: Pt has questions about how things were coded for insurance, Pt is stating that he is being charged when Pt was inpatient. Please call Pt back to discuss.      Action Taken: Other: [facility]    Travel Screening: Not Applicable     Date of Service:

## 2025-01-24 ENCOUNTER — OFFICE VISIT (OUTPATIENT)
Dept: OTOLARYNGOLOGY | Facility: CLINIC | Age: 50
End: 2025-01-24
Attending: OTOLARYNGOLOGY
Payer: MEDICARE

## 2025-01-24 ENCOUNTER — PRE VISIT (OUTPATIENT)
Dept: OTOLARYNGOLOGY | Facility: CLINIC | Age: 50
End: 2025-01-24

## 2025-01-24 DIAGNOSIS — J38.3 VOCAL CORD BOWING: ICD-10-CM

## 2025-01-24 DIAGNOSIS — R49.0 DYSPHONIA: Primary | ICD-10-CM

## 2025-01-24 DIAGNOSIS — Z98.890 HISTORY OF THYROPLASTY: ICD-10-CM

## 2025-01-24 NOTE — PATIENT INSTRUCTIONS
"After Visit Summary    Patient: Tito Pineda  Date of Visit: 1/24/2025    These notes are also available in your MyChart. Please take a few moments to find them under \"Past Appointments\" in the [employer] system.    \"Handouts\" that go along with today's order of activities include (below):     Frequency of practice: 2-3x/day unless marked otherwise    Order of today's appointment:      Functional Speech Therapy   with the [facility] Speech Team    What is functional speech therapy?    Speech therapy to address issues around voice, breathing, cough, and other throat symptoms can be tremendously beneficial. All of the [facility] Speech Language Pathologists (SLP) are specially trained to help individuals with a large range of diagnosis and disorders encompassing:      Structural changes to the larynx - nodules, polyps, granulomas, etc   Issues affecting the nerves of the larynx - vocal fold paralysis, laryngeal dystonia, etc  Inefficient or damaging patterns of muscle use in the larynx and subsystems of voice and breathing    Our larynx (voice box) lives at the intersection of not only voice, but breathing, swallowing, and cough as well.  A change to any one of those can result in an imbalance that affects the others. For example a person who coughs when they breathe deeply might adapt by taking smaller breaths, but then their voice (which relies on steady generous airflow) becomes hoarse.  Or, an individual with a small polyp on their vocal folds feels like they have to  force  their voice out, and this extra effort winds up causing throat pain and swallowing discomfort. It is our job as specialists in functional therapy to help tease apart these threads and find the most efficient patterns of muscle use possible, which can resolve symptoms and give the body the opportunity to heal fully.     Basics of functional speech therapy    If you are receiving this handout, it's likely that you have already had a " thorough evaluation with one of the Physicians and/or Speech Language Pathologists (SLP) at the [facility].  This is a crucial first step to identifying where help is needed. From that point forward, functional therapy takes place in a course of personalized sessions (each approximately 45 minutes in length) with your SLP. The exact number of sessions is specific to each patient, but your speech pathologist will give you a sense of what to expect based on your specific diagnoses and what they observed during your evaluation.  These sessions are scheduled over the course of several months, often spacing out from weekly or bi-weekly sessions to once per month or beyond. Changing the patterns of how we use our throats is like any physical activity, which means that practice is medina.  The responsibility will be on you to practice the techniques you were given between sessions.  The strategies which prove most helpful will be advanced each time you are seen, and even the techniques which don't seem as useful will guide your clinician on which avenues to try next. The good news is if you stay diligent, most patients notice improvement within the first 3 sessions.     Health insurance coverage for functional speech therapy    The ability to use your voice, breathe comfortably during athletics, or live without throat pain is considered a normal function.  If something is disordered, restoring it is considered medically necessary.  Most insurance companies recognize this, and therapy is covered under most policies.    The functional therapy offered at [facility] is a form of speech therapy.  If your insurance policy covers rehabilitation services such as physical therapy and speech therapy, your therapy is likely covered. Remember,  covered  does not mean that there is no cost as deductibles or copays may apply.   We encourage you to check both coverage and cost with your insurance company so that you don't  get any unwanted surprises.  Remember to ask about SPEECH therapy, not VOICE therapy as insurance companies may confuse this with something uncovered like  voice lessons .  They will very likely want to know the procedure (CPT) code for the service, as well as any diagnosis codes (Dx) associated with the therapy.     [facility] Procedure codes  Treatment: this is the code associated with your therapy sessions  CPT 19139    Evaluation: these codes may be associated with your initial evaluation, and may be used again if you and your clinician decide repeat evaluation is needed during the course of therapy  CPT 07142 - Perceptual Evaluation of Voice and Resonance   CPT 75166 - Aerodynamic and Acoustic assessment of Laryngeal Function  CPT 50825 - Laryngeal Endoscopy with Videostroboscopy    Diagnosis (ICD-10) Codes - Based on your evaluation    Laryngeal Hyperfunction (J38.7) and Dysphonia (R49.0)    Welcome to the [facility]    We are a multidisciplinary team of Laryngologists, Speech Language Pathologists, Nurses, and other clinical support staff who specialize in the care of Voice, Breathing, Swallowing, and other disorders that affect the upper airway. Our mission is to serve our community through world-class clinical care, vital research, and education / outreach. We are part of the [facility] Department of Otolaryngology Head and Neck Surgery, and the clinic's initial founding was generously supported by the local chapter of the [employer].  We are currently a [facility] within the Clinics and Surgery Center on the [facility].     To learn more about our clinic and its providers, please visit [facility]. For scheduling needs call [phone] to reach the ENT call center. For more specific information related to your evaluation with our clinic today please keep reading below. Should you have any additional questions, don't  hesitate to reach out to our team via [employer].     We deeply appreciate you trusting us with your care.    - [facility] Team          ARTUR Pizarro, M.M., CCC-SLP  Speech-Language Pathologist - [facility] Supervisor  [facility] Certified Vocologist  [facility]  [email or web address]  she/her

## 2025-01-24 NOTE — PROGRESS NOTES
"Voicing Efficiency      Maximum SPL 90.65 dB 82.02 3.27  Mean SPL 59.19 dB 78.64 2.99  Mean SPL During Voicing 78.12 dB 78.64 2.99  Mean Pitch 170.18 Hz 108.24 9.76  Pitch Range 151.05 Hz 10.27 3.60  Expiratory Airflow Duration 4.09 Sec 1.66 0.69  Peak Air Pressure -1.00 cm H2O 8.64 2.23  Mean Peak Air Pressure -1.00 cm H2O 6.91 1.68  Peak Expiratory Airflow 1.453 Lit/Sec 0.32 0.15  Target Airflow 0.204 Lit/Sec 0.19 0.10  Expiratory Volume 0.84 Liters 0.34 0.25  Mean Airflow During Voicing 0.394 Lit/Sec 0.19 0.10  Aerodynamic Power -1.000 centeno 0.13 0.07  Aerodynamic Resistance -1.00 cm H2O/(l/s) 45.30 26.16  Acoustic Ohms -1.00 ds/cm5 46.19 26.68  Aerodynamic Efficiency -1.00 ppm 60.80 37.79  Vital Capacity      Expiratory Airflow Duration 7.76 Sec 5.41 1.95  Peak Expiratory Airflow 9.876 Lit/Sec 2.69 0.94  Expiratory Volume 4.84 Liters 3.65 1.09  Running Speech      Maximum SPL 88.67 dB    Mean Pitch 149.57 Hz    Pitch Range 262.31 Hz    Phonation Time 2.54 Sec    Expiratory Airflow Duration 3.59 Sec    Inspiratory Airflow Duration 1.21 Sec    Peak Expiratory Airflow 2.196 Lit/Sec    Expiratory Volume 2.23 Liters    Peak Inspiratory Airflow -6.096 Lit/Sec    Inspiratory Volume -3.49 Liters    Running Speech (Repeated)      Maximum SPL 87.73 dB    Mean Pitch 150.64 Hz    Pitch Range 264.34 Hz    Phonation Time 15.84 Sec    Expiratory Airflow Duration 23.13 Sec    Inspiratory Airflow Duration 5.75 Sec    Peak Expiratory Airflow 1.569 Lit/Sec    Expiratory Volume 6.41 Liters    Peak Inspiratory Airflow -1.917 Lit/Sec    Inspiratory Volume -6.15 Liters      Acoustic Analyses of Voice  Fundamental frequency Metrics  /a/ mean F0 = 144 Hz (SD = 14.55 Hz)  /i/ mean F0 = 143 Hz (SD = 2.51 Hz)  [place] Mean f0 = 145 Hz (SD 24.84 Hz)  Glide:   Min F0 = 74 Hz  Max F0 = 1882 Hz  Range = 1808 Hz  Notes = ***  Cepstral Measures  CPPS /a/ = 23.37 dB  CPPS /i/ = 23.10 dB  CPPS \"all voiced\" = 17.72 dB  AVQI (v.3.01) = " 2.60  Additional Measures  Harmonic to Noise Ratio /a/ = 26.59 dB  Harmonic to Noise Ratio: [place] passage = 15.32 dB  Jitter (local) /a/ = 0.521 %  Shimmer (local) /a/ = 1.790 %                Can you talk louder and it is very frustrating for him and so he is very intra he last visited the ENT clinic on 06/07/2024 and had bilateral vocal cord injections on 05/30/2024, using 0.7 cc of Prolaryn gel between the two cords. He reports overall satisfaction with the results, noting a deeper voice with more volume projection and the ability to sustain his voice for up to 15 seconds. However, his throat starts to get sore after 20 minutes of talking. He has no breathing concerns but feels the injection is starting to wear off and is interested in a more permanent procedure. He has been informed that thyroplasty is an option and has an upcoming appointment with [facility] in 04/2024 to discuss this further.        FEES =- back flow of cookie - sip of liquid helps  Takes 2 meds for reflux         [facility]  [facility] - Dept. of Otolaryngology   Clinical Voice and Upper Airway Initial Evaluation Report - In-person Appointment  Voice:  Onset: 2 years  Course: parkinson anky se issues is what is suspected  (gradual/sudden)  Increaseing 5 seconds to 15 seconds.   Was able to do differnet pitches  Used to be shroter, but now better  Could speak lower and could last longer.   NONA meeting last night and they kept asking him to speak louder.  Saline nebules and but was limited in benefit.   Bilateral injection over the summer  Ent specialists at [facility] [place]  And then danni Gan and Dr. Mari  Bilateral medialization - gorgex is preferred. To help with the   Silactic in the the other hospital system  Immuno suppressed.  On work disability, but talking on the phone. Zoom meetings, or NONA stuff.   Headset with the [facility] has been helpful.   Tried in the past.   Used to sing -[place] [employer] .      -  "      Today's Quality &/or GRBAS: typical day. Has an alpha senjcoopathy - prescurser to a neuromuscular disorder.  Not parkensons, cause or meds that are making him dry  Modal Hz:   Range Hz:  MPT:  /s/:    Palpation: TH, Jaw, BOT    Throat:  Some  Does not bother him terribly.   Non productive   Not sure of specific triggers, but he will get water because there's more thorat clearing  Mucinex D day and not night.  -     Breathing:  Hypopnea - auto pap - uses the humidifier. Turned it down to keep it going all night.      Swallow:  No issues  Reflux:  Couple antacids daily - humira to avoid hives takes the antacid daily . Takes pepsid flexifenedine   s  Other remarkable notes:  ***    Laryngeal exam:  ***    Recommendations/ Plan:  ***  Additional assessment based discussion included:  ***    Patient's Name: Joel Pineda   Date of Evaluation: 1/24/2025  Providing SLP: Domi Rehman, TERE, MM, CCC-SLP   Seen in Conjunction With: Dr. Josefina Patino - {PROVIDERS-[facility]-ENT:[id number]} ***  Referring Provider and Facility: [facility]  Insurance Coverage: MEDICARE  (Certification Dates: *** - ***)  Chief Complaint: \"History of thyroplasty, failed injection recently at [facility] per patient\"   Evaluation Location: [facility]  Others in Attendance for the Evaluation: ***  Time of Evaluation: *** - ***  # of Visit: 1  # of Therapy sessions: 0      Chief complaint: Miriam is a 49 year old *** presenting today for evaluation of ***.      Salient history: He has a history significant for ***.    Patient History: Joel Pineda is a 49 year old-year-old male who presents today for evaluation of ***.     Dysphonia: ***  Onset: ***  Progression: ***  Concerns  ***  Patterns: ***  Improves with: ***  Worsens with: ***  Previous voice therapy or other interventions: ***  Voice use considerations: ***    Dyspnea: ***  Onset: ***  Progression: ***  Respiratory conditions: ***  Inhaled treatments: " ***  Most recent PFTs: ***  Concerns  More difficulty with ***  Noisy on ***  *** sensation in the ***  ***  Triggers: ***  Length of onset: ***  Length of recovery: ***    Dysphagia: ***  Onset: ***  Progression: ***  Concerns  ***  Difficult items: ***  Weight loss: ***  Recent pneumonias/chest infections: ***  Length of meal time: ***  Previous swallow study results: ***  GERD symptoms: ***    Cough / Throat Clearing: ***  Onset: ***  Progression: ***  Concerns  More *** than ***  Frequency: ***  Triggers: ***  Improves with: ***    Throat Pain/Discomfort/ Irritation: ***  Onset: ***  Progression: ***  Concerns  ***  Patterns: ***  Improves with: ***  Worsens with: ***    Globus Sensation: ***  Onset: ***  Progression: ***  Concerns  ***  Patterns: ***  Improves with: ***  Worsens with: ***    Reflux Hx: ***    Medical / Surgical History:   ***    Other Medical Evaluations, Testing, and Treatments:   ***  Past Medical History:   Diagnosis Date    Acne     Acquired hypothyroidism     Allergic state     Ankylosing spondylitis lumbar region (H)     Ankylosing spondylitis of sacral region (H)     Anxiety     Bipolar 2 disorder (H)     Chest pain     Chest pain, regulated w/BP meds. Clear arteries.    Chronic pain     Chronic, continuous use of opioids     DDD (degenerative disc disease), lumbar     Depressive disorder     Diabetes (H)     Diverticulosis     Dysautonomia (H)     Facet arthritis of cervical region     Gastroesophageal reflux disease     Hypertension     IBS (irritable bowel syndrome)     Intracranial arachnoid cyst     Lumbar radiculopathy     Major depressive disorder, recurrent episode     Multiple psych providers - they manage meds August 2015: Provigil induced severe mood dis-function     Major depressive disorder, recurrent episode, severe (H) 12/02/2020    MDD (major depressive disorder), recurrent severe, without psychosis (H) 07/21/2021    Mixed dyslipidemia 04/06/2023    Obese     LLOYD  (obstructive sleep apnea)- mild (AHI 11)     Polyneuropathy     POTS (postural orthostatic tachycardia syndrome)     Pulmonary embolism (H)     Skin exam, screening for cancer 12/03/2013    Sleep apnea     Thrombosis     Uncomplicated asthma      Past Surgical History:   Procedure Laterality Date    BACK SURGERY  10/2007    lumbar discectomy L5-S1    BIOPSY  09/15/2020    Colon Adenomas x2    COLONOSCOPY      Note: colonoscopy scheduled with [facility] on Friday, 9/4/15    COSMETIC SURGERY  2012    Nose Exterior - functional    GI SURGERY  08/2013    Sigmoidectomy    HERNIA REPAIR, UMBILICAL  08/23/2011    small, Dr. Evan Beavers    INCISION AND DRAINAGE, ABSCESS, COMPLEX  08/23/2011    umbilical, Dr. Evan Beavers    LAMINECTOMY LUMBAR TWO LEVELS Bilateral 9/25/2024    Procedure: bilateral lumbar 4-lumbar 5 hemilaminectomies, medial facetectomies and foraminotomies with left lumbar microdiscectomy and lumbar 5-sacral 1 foraminotomies;  Surgeon: Anitha Biggs MD;  Location: [facility] OR    LAPAROSCOPIC ASSISTED COLECTOMY LEFT (DESCENDING)  08/15/2013    Procedure: LAPAROSCOPIC ASSISTED COLECTOMY LEFT (DESCENDING);  Laparoscopic Hand Assisted Sigmoid Resection, Mobilization of Splenic Fissure, coloproctoscopy, *Latex Free Room* Anesthesia General with Pain block  ;  Surgeon: Aurora Justice MD;  Location: [facility] OR    NERVE SURGERY  08/18/2011    RF ablation @ L3-S1 @ [facility]    RECONSTRUCT NOSE AND SEPTUM (FUNCTIONAL)  10/14/2011    Procedure:RECONSTRUCT NOSE AND SEPTUM (FUNCTIONAL); Functional Septorhinoplasty, Turbinate Reduction, ; Surgeon:CEDRIC CUEVAS; Location:[facility] OR    SINUS SURGERY  10/01/2001    ethmoidectomy chronic sinusitis    SOFT TISSUE SURGERY  4/7/2022    Hidradenitis Suppurativa surgery       Cognitive Status / Ability to Comprehend Directions: ***    Barriers to Progress: ***    Daily Water Intake: ***    Caffeine Intake: ***    Other Beverage Intake: ***    Alcohol Intake: ***    Past / Current  "Tobacco Use / Exposure: ***    Current Diet: ***    Weight: ***    Work / School Status: ***      Laryngeal Review of Systems:    Dysphonia:  ***  Odynophonia:  ***  Globus Sensation: ***  Dysphagia:  ***  Odynophagia:  ***  Hemoptysis:  ***  Otalgia:   ***  Dyspnea:  ***  Stridor:  ***  Wheezing: ***  Throat Clear:  ***  Cough:   ***  Reflux:   ***  Allergies:  ***      Quality of Life Questionnaires (Objective Measures):    Patient Supplied Answers To VHI Questionnaire      1/19/2025    10:33 AM   Voice Handicap Index (VHI-10)   My voice makes it difficult for people to hear me 3   People have difficulty understanding me in a noisy room 3   My voice difficulties restrict my personal and social life.  2   I feel left out of conversations because of my voice 2   My voice problem causes me to lose income 0   I feel as though I have to strain to produce voice 2   The clarity of my voice is unpredictable 3   My voice problem upsets me 3   My voice makes me feel handicapped 1   People ask, \"What's wrong with your voice?\" 1   VHI-10 20        Patient-reported       Patient Supplied Answers To CSI Questionnaire      1/19/2025    10:33 AM   Cough Severity Index (CSI)   My cough is worse when I lie down 0   My coughing problem causes me to restrict my personal and social life 1   I tend to avoid places because of my cough problem 1   I feel embarrassed because of my coughing problem 2   People ask, ''What's wrong?'' because I cough a lot 0   I run out of air when I cough 2   My coughing problem affects my voice 2   My coughing problem limits my physical activity 1   My coughing problem upsets me 2   People ask me if I am sick because I cough a lot 1   CSI Score 12        Patient-reported       Patient Supplied Answers To EAT Questionnaire       No data to display                  ***lbtesq    PATIENT REPORTED MEASURES:       No data to display                    1/19/2025    10:33 AM   Voice Handicap Index (VHI-10)   My " "voice makes it difficult for people to hear me 3   People have difficulty understanding me in a noisy room 3   My voice difficulties restrict my personal and social life.  2   I feel left out of conversations because of my voice 2   My voice problem causes me to lose income 0   I feel as though I have to strain to produce voice 2   The clarity of my voice is unpredictable 3   My voice problem upsets me 3   My voice makes me feel handicapped 1   People ask, \"What's wrong with your voice?\" 1   VHI-10 20        Patient-reported           1/19/2025    10:33 AM   Cough Severity Index (CSI)   My cough is worse when I lie down 0   My coughing problem causes me to restrict my personal and social life 1   I tend to avoid places because of my cough problem 1   I feel embarrassed because of my coughing problem 2   People ask, ''What's wrong?'' because I cough a lot 0   I run out of air when I cough 2   My coughing problem affects my voice 2   My coughing problem limits my physical activity 1   My coughing problem upsets me 2   People ask me if I am sick because I cough a lot 1   CSI Score 12        Patient-reported            No data to display                    1/19/2025    10:33 AM   Dyspnea Index   1. I have trouble getting air in. 2   2. I feel tightness in my throat when I am having lindsey breathing problem. 2   3. It takes more effort to breathe than it used to. 2   4. Change in weather affect my breathing problem. 0   5. My breathing gets worse with stress. 0   6. I make sound/noise breathing in 0   7. I have to strain to breathe. 1   8. My shortness of breath gets worse with exercise or physical activity 2   9. My breathing problem makes me feel stressed. 2   10. My breathing problem casuses me to restrict my personal and social life. 1   Dyspnea Index Total Score 12        Patient-reported       SINGING VOICE HANDICAP INDEX -10  (SVHI10)  It takes a lot of effort sing    I am unsure of what will come out when I sing  " "  My voice \"gives out\" on me while I am singing    My singing voice upsets me    I have no confidence in my singing voice    I have trouble making my voice do what I want it to do    I have to \"push\" to produce my voice when singing    My singing voice tires easily    I feel something is missing in my life because of my inability to sing    I am unable to use my \"high voice\"    Total         Perceptual Analysis (16839): Evaluation of Voice / Speech / Non-Communicative Laryngeal Behaviors  The GRBAS is a perceptual rating of voice change. 0 indicates no impairment, 3 indicates a severe impairment. \"C\" and \"I\" may be used to signify if these feature was observed consistently or intermittently respectively. This is a rating based on clinical judgement of disordered voice quality.  G ( *** ) General Dysphonia     R ( *** ) Roughness     B ( *** ) Breathiness     A ( *** ) Asthenia     S ( *** ) Strain  Additional information: ***    Stimuli for differential diagnosis of spasmodic dysphonia and vocal tremor:  Sustained vowel: ***  Voiced-loaded stimuli (e.g. \"We were away a year ago,\" counting from 80-90): ***  Voiceless-loaded stimuli (e.g. \"How hard did he hit him?,\" counting from 50-60): ***  Singing: ***    *Validity of this measure may be slightly reduced when performed via acoustic signal from virtual visit*    Laryngeal palpation:   Thyrohyoid space: ***  Suprahyoid region: ***  Laryngeal lateralization: ***    Additional observations:   Cough/ Throat Clear: {coughthroatclear:841653}    Breathing patterns: appropriate at rest ***  Overt tension: \" \" ***  Habitual pitch: WNL compared to age- and gender-matched peers ***  Pitch range: \" \" ***  Resonance: ***  Loudness: appropriate ***    POSTURE / TENSION/ PALPATION OF THE LARYNGEAL AREA:   {Perceptual Tension:676977}    BREATHING:   {Perceptual Breathin}    LARYNGEAL PALPATION:   {Laryngeal Palpation:514992}    VOICE:  [place] states that today is a typical " voice today, with clinician observing voice quality to be characterized as:  Roughness: {[facility] VOICE SEVERITY RATINGS:306910}  Breathiness: {[facility] VOICE SEVERITY RATINGS:981119}  Strain: {[facility] VOICE SEVERITY RATINGS:608478}  ***{Other Perceptual Categories:464350}  Pitch:  F0/Habitual/Conversational speech:  {Perceptual Pitch:451548} ***informally judged as WNL  Voiced /i/ Max Phonation Time: ***informally judged as WNL  Voiceless /s/ max: ***informally judged as WNL  Pitch glide: ***neurologically normal  Maximum Phonation Time: *** seconds informally judged as WNL  ***Singing voice: ***not a milton  Singing voice type:  Identifies as: ***  Recommended to sing:  ***  Resonance:   Conversational speech:  {Perceptual Resonance:510537}  Loudness  Conversational speech:  {Perceptual Loudness:668228}  Projected speech:  {Perceptual Loudness:000425}  Singing vs. Speech:  ***n/a    Acoustic and Aerodynamic Analysis (52121):    Voice samples were recorded and analyzed within Praat software with aerodynamic information taken in [employer] PAS software. *** 52 modifier will be used for billing purposes, as aerodynamic evaluation could not be completed    Normative data:  Jitter %: less than 1  Shimmer dB: less than 0.35  NHR: less than 0.194  Average f0 in connected speech: 170-220 female, 100-150 male  CPPS: greater than 19.10 for Praat    Acoustic Analysis    [INSERT AVQI IMAGE]***  [INSERT ACOUSTIC MEASURES]    Aerodynamic Analysis    ***     Interpretation:  ***  Acoustic findings of *** elevated perturbation features*** are consistent with perceptual finding of ***  Aerodynamic finding of *** low air flow and *** elevated peak air flow pressure are consistent with patient reported increased effort.      Laryngoscopy with*** stroboscopy:    [facility]}    Informed consent: Informed verbal consent was obtained, which includes potential side-effects, risks, and benefits of the  "procedure.     Anesthetic: {jdganesthetic:467443}    Scope type: A distal chip flexible laryngoscope was passed through the nare with halogen and xenon*** light source(s).    Scope serial number: ***    The laryngeal and pharyngeal structures were evaluated for gross appearance, mobility, function, and focal lesions / abnormalities of the associated mucosa.    Stroboscopic Observations   R Amplitude: {movement ratin} L Amplitude: {movement ratin}   R Mucosal Wave: {movement ratin} L Mucosal Wave:{movement ratin}   Phase Symmetry: {phase symmetry:183718} Periodicity: {periodicity:179562}   Phase Closure: {proportion of closed vibratory cycle:018027} Vertical Level of Approximation: {vertical plane:775121}   Additional Observations: ***       Endoscopic Observations   Appearance: {visual appearance:578509}   Right (R) Vocal Fold Edge: *** color, {appearance of vocal fold edge:534027} Left (L) Vocal Fold Edge: *** color, {appearance of vocal fold edge:551887}   Glottic Closure: {appearance of glottic closure pattern:377557}    R Arytenoid Ab/Adduction: {arytenoid mobility:464938} L Arytenoid Ab/Adduction: {arytenoid mobility:688746}      Mediolateral Compression: {degree of supraglottic hyperfunction:576239} compression Anterior-Posterior Compression: {degree of supraglottic hyperfunction:982976} compression   Narrow Band Imaging: Consistent with halogen light findings***   Additional Observations: ***     Therapeutic Probes: ***  /m/, /n/, \"ng\" resulted in ***  Flow/high airflow stimuli resulted in ***  Glottal coup resulted in ***  Increased volume resulted in ***  Rescue breathing techniques utilizing brisk, nasal inhalation and pursed lip inspiration with prolonged, high pressure exhalation revealed *** maximal vocal fold abduction with maintenance of the glottic airway during exhalation  A coughing attack was triggered during laryngoscopy today, and pulsed \"sh\" resulted in reduced " "length and severity of the attack    FEES: Evaluation was performed by *** and Dr. Patino. Impressions from this clinical document indicates: \"***\"    Therapeutic Techniques Attempted (21110 for Individual Speech Therapy):    ***       Impressions and Plan:     Joel Pineda is a 49 year old-year-old male presenting today with *** secondary to ***. Perceptually, ***. Laryngeal function studies indicated *** which aligns with perceptual findings. Laryngoscopy today demonstrated ***. Therefore, *** has been recommended. ***. *** is in agreement with this plan of care.     RESEARCH: A warm introduction was *** provided regarding participation in laryngology research studies. This patient is *** interested in being contacted.    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 *** is in agreement with this plan of care and plans to check with her insurance about coverage prior to beginning sessions. - Goals: to improve and maintain a healthy voice quality, to understand the problem and fix it as much as possible, report resolution of symptoms, and use of optimal voice quality and comfort to meet personal, social, and professional needs, 90% of the " time during a typical week of vocal activities      Goals:    VCD/PVFM  Gradually decrease VCD episodes to increase quality of life and ability to participate in high level cardio activity without limitations  Perform rescue breathing techniques while asymptomatic to improve automatic response when experiencing dyspnea  Perform rescue breathing techniques before and during exercise to prevent dyspnea/severe VCD attack  Perform rescue breathing during exercise or when exposed to a trigger item to resolve a VCD attack or coughing episode  Continue participating in high level cardio activity while performing rescue breathing to resolve dyspnea  Utilize abdominal breathing techniques in targeted activities (e.g. physical exercise, communication, high-level phonation/pitch range navigation exercises, etc.)  Rebalance laryngeal musculature and strengthen inspiratory muscles resulting in decreased laryngeal sensitivity and decreased frequency of VCD episodes.?  Demonstrate appropriate vocal hygiene including, but not limited to, adequate hydration and integrating behavioral and dietary changes for GERD into their daily life.?   Recoordinate respiratory and laryngeal musculature to resume activities of daily living.?   Patient will perform advanced breathing exercises with the respiratory  focusing on inspiratory muscle strengthening with minimal assistance 90% of the time.  Patient will demonstrate abdominal focused breathing technique in targeted exercises with minimal assistance 90% of the time.?   Patient will demonstrate abdominal focused breathing technique with physical activity with minimal assistance 90% of the time.  Gradually reduce inappropriate and excessive inhaler use  Demonstrate a 50% reduction in Dyspnea Index score  Patient will express satisfaction with their breathing and their ability to participate in social, personal, and work/school functions without limitation    Chronic Cough  Decrease  cough in all activities of daily living using compensation strategies  Independently implement a cough suppression strategy when cough trigger is sensed 90% of the time.?   Decrease the number of coughs per day by 50%   Demonstrate increased tolerance of a chemical and/or environmental irritant as evidenced by increased exposure (decreased proximity and/or increased strength) to that irritant by 50%   Demonstrate a 50% reduction in Cough Severity Index score  Patient will express satisfaction with their coughing and their ability to participate in social, personal, and work/school functions without limitation     Voice  Coordinate phonatory and respiratory subsystems, demonstrating adequate independence for activities of daily communication   Decrease extrinsic laryngeal muscle activity during communication events   Improve voice quality in all activities of daily living using, as measured by a minimum of a 50% point reduction on the Voice Handicap Index-10 or Singing VHI  Demonstrate appropriate vocal hygiene including, but not limited to, adequate hydration, avoiding vocal abuse, integrating behavioral and dietary changes for GERD into their daily life?.   Incorporate behaviors to reduce irritation and promote laryngeal healing and prevent recurrence.?   Implement behavioral strategies to reduce laryngopharyngeal reflux. ?   Independently maintain a forward focus voice placement 90% of the time during conversational speech.  Independently perform the Vocal Function Exercises, rebalancing respiration, phonation, and resonance 90% of the time  Perform High Level Phonation and Pitch Range Navigation Exercises independently 90% of the time  Patient will express satisfaction with their voice quality and function and their ability to participate in social, personal, and work/school functions without limitation    Perioperative  Adhere to complete vocal rest recommendations in the acute post-operative period  Gradually  increase voice use following the complete voice rest period  Adhere to vocal hygiene recommendations including smoking cessation, hydration, avoidance of caffeine and alcohol, and behavioral reflux precautions  Perform rescue breathing techniques several times daily  Incorporate SOVT exercises gradually in the immediate post-operative period   Participate in pre- and post-operative voice therapy      Billed Procedures:    Laryngoscopy without stroboscopy 99445  Laryngoscopy with stroboscopy 77075  Individual speech therapy session 12464  Perceptual voice assessment 64520  Laryngeal function studies 94577 with 52 modifier  Assessment and treatment time: *** minutes  Chart review, interpretation of testing, documentation preparation, etc: *** minutes      Thank you for allowing me to participate in this patient's care,    ARTUR Pizarro, M.M., CCC-SLP  Speech-Language Pathologist  [place] Trained Vocologist  [facility]  [email or web address]  Pronouns: she/her      *this report was created in part through the use of computerized dictation software, and though reviewed following completion, some typographic errors may persist.  If there is confusion regarding any of this notes contents, please contact me for clarification        ASSESSMENT / PLAN  IMPRESSIONS: Joel Pineda is a 49 year old ***, presenting today with {[facility] ENT-ICD-10 CODES:461111} in the context of {[facility] ENT-ICD-10 CODES:360036}, as evidenced by evaluation the results of the *** laryngeal function studies, as well as the laryngeal exam.    Remarkable findings included:  Perceptual evaluation demonstrated: ***  Laryngeal exam demonstrated: ***  Primary complaint of patient today included: ***    Additional assessment based discussion included:  ***    Therefore, we recommended a course of speech therapy to address these concerns.    ***{DYSPHONIA:034570095}. Laryngeal function studies show significant ***increase in trans-glottal  "airflow compared to age and gender matched norms, ***increased inferred subglottal pressure, and significantly ***elevated AVQI.    STIMULABILITY: results of therapy probes during perceptual and laryngeal evaluation demonstrate improvement with {Therapy Probe Response:226620}    RECOMMENDATIONS:   {Therapy recommendations:365741}  He demonstrates a {PROGNOSIS:514493570::\"Good\"} prognosis for improvement given adherence to therapeutic recommendations.   Positive indicators: {[facility] Positive Prognostic Indicators:679590}  Negative indicators: ***  Research: This patient is ***willing to be contacted about participation in research. May be eligible for *** study.    DURATION / FREQUENCY: *** one-hour sessions ***2 one-hour weekly sessions, followed by 2 one-hour biweekly sessions.  A total of 6-8 sessions may be necessary.    ***GOALS:  Patient goal:   {GOALS:470008487}    Short-term goal(s): Within the first 4 sessions, Mr. Pineda:  {DWLVCGOALS:733972}    Long-term goal(s): In *** months, Mr. Pineda will:  {LONG TERM GOALS:118623484}    This treatment plan was developed with the patient who agreed with the recommendations.    ***SAMEDAY THERAPY NOTE GOES HERE, USE .DWSPTXSAMEDAY***    TOTAL SERVICE TIME: *** minutes  {LOS:504313::\"NO CHARGE FACILITY FEE (96420)\"}  {SLPLOS:368823}    "

## 2025-01-24 NOTE — LETTER
1/24/2025       RE: Joel Pineda  [address] [place] [address]     Dear Colleague,    Thank you for referring your patient, Joel Pineda, to the [facility] VOICE CLINIC [place] [facility]. Please see a copy of my visit note below.        [facility] - Dept. of Otolaryngology   Clinical Voice and Upper Airway Initial Evaluation Report - In-person Appointment  Voice:  Onset: ***  Course: ***  (gradual/sudden)  Increaseing 5 seconds to 15 seconds.   Was able to do differnet pitches  Used to be shroter, but now better  Could speak lower and could last longer.   NONA meeting last night and they kept asking him to speak louder.  Saline nebules and but was limited in benefit.   Bilateral injection over the summer  Ent specialists at [facility]  And then danni Gan and Dr. Mari  Bilateral medialization - gorgex is preferred. To help with the   Silactic in the the [facility] hospital system  Immuno suppressed.  On work disability, but talking on the phone. Zoom meetings, or NONA stuff.   Headset with the [facility] has been helpful.   Tried in the past.   Used to sing -went to [employer] .      -       Today's Quality &/or GRBAS: typical day. Has an alpha senjcoopathy - prescurser to a neuromuscular disorder.  Not parkensons, cause or meds that are making him dry  Modal Hz:   Range Hz:  MPT:  /s/:    Palpation: TH, Jaw, BOT    Throat:  Some  Does not bother him terribly.   Non productive   Not sure of specific triggers, but he will get water because there's more thorat clearing  Mucinex D day and not night.  -     Breathing:  Hypopnea - auto pap - uses the humidifier. Turned it down to keep it going all night.    Odors and smoke    Swallow:  No issues  Reflux:  Couple antacids daily - humira to avoid hives takes the antacid daily . Takes pepsid flexifenedine   s  Other remarkable notes:  ***    Laryngeal  "exam:  ***    Recommendations/ Plan:  ***  Additional assessment based discussion included:  ***    Patient's Name: Joel Pineda   Date of Evaluation: 1/24/2025  Providing SLP: Domi Rehman, TERE, MM, CCC-SLP   Seen in Conjunction With: Dr. Josefina Patino - [facility][id number]} ***  Referring Provider and Facility: [facility]  Insurance Coverage: MEDICARE  (Certification Dates: *** - ***)  Chief Complaint: \"History of thyroplasty, failed injection recently at [facility] per patient\"   Evaluation Location: [facility]  Others in Attendance for the Evaluation: ***  Time of Evaluation: *** - ***  # of Visit: 1  # of Therapy sessions: 0      Chief complaint: Miriam is a 49 year old *** presenting today for evaluation of ***.      Salient history: He has a history significant for ***.    Patient History: Joel Pineda is a 49 year old-year-old male who presents today for evaluation of ***.     Dysphonia: ***  Onset: ***  Progression: ***  Concerns  ***  Patterns: ***  Improves with: ***  Worsens with: ***  Previous voice therapy or other interventions: ***  Voice use considerations: ***    Dyspnea: ***  Onset: ***  Progression: ***  Respiratory conditions: ***  Inhaled treatments: ***  Most recent PFTs: ***  Concerns  More difficulty with ***  Noisy on ***  *** sensation in the ***  ***  Triggers: ***  Length of onset: ***  Length of recovery: ***    Dysphagia: ***  Onset: ***  Progression: ***  Concerns  ***  Difficult items: ***  Weight loss: ***  Recent pneumonias/chest infections: ***  Length of meal time: ***  Previous swallow study results: ***  GERD symptoms: ***    Cough / Throat Clearing: ***  Onset: ***  Progression: ***  Concerns  More *** than ***  Frequency: ***  Triggers: ***  Improves with: ***    Throat Pain/Discomfort/ Irritation: ***  Onset: ***  Progression: ***  Concerns  ***  Patterns: ***  Improves with: ***  Worsens with: ***    Globus Sensation: ***  Onset: " ***  Progression: ***  Concerns  ***  Patterns: ***  Improves with: ***  Worsens with: ***    Reflux Hx: ***    Medical / Surgical History:   ***    Other Medical Evaluations, Testing, and Treatments:   ***  Past Medical History:   Diagnosis Date     Acne      Acquired hypothyroidism      Allergic state      Ankylosing spondylitis lumbar region (H)      Ankylosing spondylitis of sacral region (H)      Anxiety      Bipolar 2 disorder (H)      Chest pain     Chest pain, regulated w/BP meds. Clear arteries.     Chronic pain      Chronic, continuous use of opioids      DDD (degenerative disc disease), lumbar      Depressive disorder      Diabetes (H)      Diverticulosis      Dysautonomia (H)      Facet arthritis of cervical region      Gastroesophageal reflux disease      Hypertension      IBS (irritable bowel syndrome)      Intracranial arachnoid cyst      Lumbar radiculopathy      Major depressive disorder, recurrent episode     Multiple psych providers - they manage meds August 2015: Provigil induced severe mood dis-function      Major depressive disorder, recurrent episode, severe (H) 12/02/2020     MDD (major depressive disorder), recurrent severe, without psychosis (H) 07/21/2021     Mixed dyslipidemia 04/06/2023     Obese      LLOYD (obstructive sleep apnea)- mild (AHI 11)      Polyneuropathy      POTS (postural orthostatic tachycardia syndrome)      Pulmonary embolism (H)      Skin exam, screening for cancer 12/03/2013     Sleep apnea      Thrombosis      Uncomplicated asthma      Past Surgical History:   Procedure Laterality Date     BACK SURGERY  10/2007    lumbar discectomy L5-S1     BIOPSY  09/15/2020    Colon Adenomas x2     COLONOSCOPY      Note: colonoscopy scheduled with [facility] on Friday, 9/4/15     COSMETIC SURGERY  2012    Nose Exterior - functional     GI SURGERY  08/2013    Sigmoidectomy     HERNIA REPAIR, UMBILICAL  08/23/2011    Dr. Evan whiting     INCISION AND DRAINAGE, ABSCESS, COMPLEX   08/23/2011    umbilical, Dr. Evan Beavers     LAMINECTOMY LUMBAR TWO LEVELS Bilateral 9/25/2024    Procedure: bilateral lumbar 4-lumbar 5 hemilaminectomies, medial facetectomies and foraminotomies with left lumbar microdiscectomy and lumbar 5-sacral 1 foraminotomies;  Surgeon: Anitha Biggs MD;  Location: [facility] OR     LAPAROSCOPIC ASSISTED COLECTOMY LEFT (DESCENDING)  08/15/2013    Procedure: LAPAROSCOPIC ASSISTED COLECTOMY LEFT (DESCENDING);  Laparoscopic Hand Assisted Sigmoid Resection, Mobilization of Splenic Fissure, coloproctoscopy, *Latex Free Room* Anesthesia General with Pain block  ;  Surgeon: Aurora Justice MD;  Location: [facility] OR     NERVE SURGERY  08/18/2011    RF ablation @ L3-S1 @ [facility]     RECONSTRUCT NOSE AND SEPTUM (FUNCTIONAL)  10/14/2011    Procedure:RECONSTRUCT NOSE AND SEPTUM (FUNCTIONAL); Functional Septorhinoplasty, Turbinate Reduction, ; Surgeon:CEDRIC CUEVAS; Location:[facility] OR     SINUS SURGERY  10/01/2001    ethmoidectomy chronic sinusitis     SOFT TISSUE SURGERY  4/7/2022    Hidradenitis Suppurativa surgery       Cognitive Status / Ability to Comprehend Directions: ***    Barriers to Progress: ***    Daily Water Intake: ***    Caffeine Intake: ***    Other Beverage Intake: ***    Alcohol Intake: ***    Past / Current Tobacco Use / Exposure: ***    Current Diet: ***    Weight: ***    Work / School Status: ***      Laryngeal Review of Systems:    Dysphonia:  ***  Odynophonia:  ***  Globus Sensation: ***  Dysphagia:  ***  Odynophagia:  ***  Hemoptysis:  ***  Otalgia:   ***  Dyspnea:  ***  Stridor:  ***  Wheezing: ***  Throat Clear:  ***  Cough:   ***  Reflux:   ***  Allergies:  ***      Quality of Life Questionnaires (Objective Measures):    Patient Supplied Answers To VHI Questionnaire      1/19/2025    10:33 AM   Voice Handicap Index (VHI-10)   My voice makes it difficult for people to hear me 3   People have difficulty understanding me in a noisy room 3   My voice  "difficulties restrict my personal and social life.  2   I feel left out of conversations because of my voice 2   My voice problem causes me to lose income 0   I feel as though I have to strain to produce voice 2   The clarity of my voice is unpredictable 3   My voice problem upsets me 3   My voice makes me feel handicapped 1   People ask, \"What's wrong with your voice?\" 1   VHI-10 20        Patient-reported       Patient Supplied Answers To CSI Questionnaire      1/19/2025    10:33 AM   Cough Severity Index (CSI)   My cough is worse when I lie down 0   My coughing problem causes me to restrict my personal and social life 1   I tend to avoid places because of my cough problem 1   I feel embarrassed because of my coughing problem 2   People ask, ''What's wrong?'' because I cough a lot 0   I run out of air when I cough 2   My coughing problem affects my voice 2   My coughing problem limits my physical activity 1   My coughing problem upsets me 2   People ask me if I am sick because I cough a lot 1   CSI Score 12        Patient-reported       Patient Supplied Answers To EAT Questionnaire       No data to display                  ***lbtesq    PATIENT REPORTED MEASURES:       No data to display                    1/19/2025    10:33 AM   Voice Handicap Index (VHI-10)   My voice makes it difficult for people to hear me 3   People have difficulty understanding me in a noisy room 3   My voice difficulties restrict my personal and social life.  2   I feel left out of conversations because of my voice 2   My voice problem causes me to lose income 0   I feel as though I have to strain to produce voice 2   The clarity of my voice is unpredictable 3   My voice problem upsets me 3   My voice makes me feel handicapped 1   People ask, \"What's wrong with your voice?\" 1   VHI-10 20        Patient-reported           1/19/2025    10:33 AM   Cough Severity Index (CSI)   My cough is worse when I lie down 0   My coughing problem causes me " "to restrict my personal and social life 1   I tend to avoid places because of my cough problem 1   I feel embarrassed because of my coughing problem 2   People ask, ''What's wrong?'' because I cough a lot 0   I run out of air when I cough 2   My coughing problem affects my voice 2   My coughing problem limits my physical activity 1   My coughing problem upsets me 2   People ask me if I am sick because I cough a lot 1   CSI Score 12        Patient-reported            No data to display                    1/19/2025    10:33 AM   Dyspnea Index   1. I have trouble getting air in. 2   2. I feel tightness in my throat when I am having lindsey breathing problem. 2   3. It takes more effort to breathe than it used to. 2   4. Change in weather affect my breathing problem. 0   5. My breathing gets worse with stress. 0   6. I make sound/noise breathing in 0   7. I have to strain to breathe. 1   8. My shortness of breath gets worse with exercise or physical activity 2   9. My breathing problem makes me feel stressed. 2   10. My breathing problem casuses me to restrict my personal and social life. 1   Dyspnea Index Total Score 12        Patient-reported       SINGING VOICE HANDICAP INDEX -10  (SVHI10)  It takes a lot of effort sing    I am unsure of what will come out when I sing    My voice \"gives out\" on me while I am singing    My singing voice upsets me    I have no confidence in my singing voice    I have trouble making my voice do what I want it to do    I have to \"push\" to produce my voice when singing    My singing voice tires easily    I feel something is missing in my life because of my inability to sing    I am unable to use my \"high voice\"    Total         Perceptual Analysis (90502): Evaluation of Voice / Speech / Non-Communicative Laryngeal Behaviors  The GRBAS is a perceptual rating of voice change. 0 indicates no impairment, 3 indicates a severe impairment. \"C\" and \"I\" may be used to signify if these feature was " "observed consistently or intermittently respectively. This is a rating based on clinical judgement of disordered voice quality.  G ( *** ) General Dysphonia     R ( *** ) Roughness     B ( *** ) Breathiness     A ( *** ) Asthenia     S ( *** ) Strain  Additional information: ***    Stimuli for differential diagnosis of spasmodic dysphonia and vocal tremor:  Sustained vowel: ***  Voiced-loaded stimuli (e.g. \"We were away a year ago,\" counting from 80-90): ***  Voiceless-loaded stimuli (e.g. \"How hard did he hit him?,\" counting from 50-60): ***  Singing: ***    *Validity of this measure may be slightly reduced when performed via acoustic signal from virtual visit*    Laryngeal palpation:   Thyrohyoid space: ***  Suprahyoid region: ***  Laryngeal lateralization: ***    Additional observations:   Cough/ Throat Clear: {coughthroatclear:884270}    Breathing patterns: appropriate at rest ***  Overt tension: \" \" ***  Habitual pitch: WNL compared to age- and gender-matched peers ***  Pitch range: \" \" ***  Resonance: ***  Loudness: appropriate ***    POSTURE / TENSION/ PALPATION OF THE LARYNGEAL AREA:   {Perceptual Tension:451615}    BREATHING:   {Perceptual Breathin}    LARYNGEAL PALPATION:   {Laryngeal Palpation:041065}    VOICE:  Sherwood states that today is a typical voice today, with clinician observing voice quality to be characterized as:  Roughness: {[facility] VOICE SEVERITY RATINGS:192899}  Breathiness: {[facility] VOICE SEVERITY RATINGS:897485}  Strain: {[facility] VOICE SEVERITY RATINGS:272591}  ***{Other Perceptual Categories:495356}  Pitch:  F0/Habitual/Conversational speech:  {Perceptual Pitch:638773} ***informally judged as WNL  Voiced /i/ Max Phonation Time: ***informally judged as WNL  Voiceless /s/ max: ***informally judged as WNL  Pitch glide: ***neurologically normal  Maximum Phonation Time: *** seconds informally judged as WNL  ***Singing voice: ***not a milton  Singing voice type:  Identifies as: ***  Recommended " to sing:  ***  Resonance:   Conversational speech:  {Perceptual Resonance:455586}  Loudness  Conversational speech:  {Perceptual Loudness:161226}  Projected speech:  {Perceptual Loudness:280525}  Singing vs. Speech:  ***n/a    Acoustic and Aerodynamic Analysis (87910):    Voice samples were recorded and analyzed within Praat software with aerodynamic information taken in Kay[employer]ent[employer] PAS software. *** 52 modifier will be used for billing purposes, as aerodynamic evaluation could not be completed    Normative data:  Jitter %: less than 1  Shimmer dB: less than 0.35  NHR: less than 0.194  Average f0 in connected speech: 170-220 female, 100-150 male  CPPS: greater than 19.10 for Praat    Acoustic Analysis    [INSERT AVQI IMAGE]***  [INSERT ACOUSTIC MEASURES]    Aerodynamic Analysis    ***     Interpretation:  ***  Acoustic findings of *** elevated perturbation features*** are consistent with perceptual finding of ***  Aerodynamic finding of *** low air flow and *** elevated peak air flow pressure are consistent with patient reported increased effort.      Laryngoscopy with*** stroboscopy:    Provider performing exam: {PROVIDERS-[facility]-ENT:872838}    Informed consent: Informed verbal consent was obtained, which includes potential side-effects, risks, and benefits of the procedure.     Anesthetic: {jdganesthetic:202008}    Scope type: A distal chip flexible laryngoscope was passed through the nare with halogen and xenon*** light source(s).    Scope serial number: ***    The laryngeal and pharyngeal structures were evaluated for gross appearance, mobility, function, and focal lesions / abnormalities of the associated mucosa.    Stroboscopic Observations   R Amplitude: {movement ratin} L Amplitude: {movement ratin}   R Mucosal Wave: {movement ratin} L Mucosal Wave:{movement ratin}   Phase Symmetry: {phase symmetry:160541} Periodicity: {periodicity:289513}   Phase Closure: {proportion of closed  "vibratory cycle:626445} Vertical Level of Approximation: {vertical plane:613345}   Additional Observations: ***       Endoscopic Observations   Appearance: {visual appearance:335336}   Right (R) Vocal Fold Edge: *** color, {appearance of vocal fold edge:387803} Left (L) Vocal Fold Edge: *** color, {appearance of vocal fold edge:056750}   Glottic Closure: {appearance of glottic closure pattern:648297}    R Arytenoid Ab/Adduction: {arytenoid mobility:183367} L Arytenoid Ab/Adduction: {arytenoid mobility:614723}      Mediolateral Compression: {degree of supraglottic hyperfunction:269933} compression Anterior-Posterior Compression: {degree of supraglottic hyperfunction:999184} compression   Narrow Band Imaging: Consistent with halogen light findings***   Additional Observations: ***     Therapeutic Probes: ***  /m/, /n/, \"ng\" resulted in ***  Flow/high airflow stimuli resulted in ***  Glottal coup resulted in ***  Increased volume resulted in ***  Rescue breathing techniques utilizing brisk, nasal inhalation and pursed lip inspiration with prolonged, high pressure exhalation revealed *** maximal vocal fold abduction with maintenance of the glottic airway during exhalation  A coughing attack was triggered during laryngoscopy today, and pulsed \"sh\" resulted in reduced length and severity of the attack    FEES: Evaluation was performed by *** and Dr. Patino. Impressions from this clinical document indicates: \"***\"    Therapeutic Techniques Attempted (75468 for Individual Speech Therapy):    ***       Impressions and Plan:     Joel Pineda is a 49 year old-year-old male presenting today with *** secondary to ***. Perceptually, ***. Laryngeal function studies indicated *** which aligns with perceptual findings. Laryngoscopy today demonstrated ***. Therefore, *** has been recommended. ***. *** is in agreement with this plan of care.     RESEARCH: A warm introduction was *** provided regarding participation in laryngology " research studies. This patient is *** interested in being contacted.    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 *** is in agreement with this plan of care and plans to check with her insurance about coverage prior to beginning sessions. - Goals: to improve and maintain a healthy voice quality, to understand the problem and fix it as much as possible, report resolution of symptoms, and use of optimal voice quality and comfort to meet personal, social, and professional needs, 90% of the time during a typical week of vocal activities      Goals:    VCD/PVFM  Gradually decrease VCD episodes to increase quality of life and ability to participate in high level cardio activity without limitations  Perform rescue breathing techniques while asymptomatic to improve automatic response when experiencing dyspnea  Perform rescue breathing techniques before and during exercise to prevent dyspnea/severe VCD attack  Perform rescue breathing during exercise or when exposed to a trigger item to resolve a VCD attack or coughing episode  Continue participating in high level cardio activity while performing rescue breathing to resolve dyspnea  Utilize  abdominal breathing techniques in targeted activities (e.g. physical exercise, communication, high-level phonation/pitch range navigation exercises, etc.)  Rebalance laryngeal musculature and strengthen inspiratory muscles resulting in decreased laryngeal sensitivity and decreased frequency of VCD episodes.?  Demonstrate appropriate vocal hygiene including, but not limited to, adequate hydration and integrating behavioral and dietary changes for GERD into their daily life.?   Recoordinate respiratory and laryngeal musculature to resume activities of daily living.?   Patient will perform advanced breathing exercises with the respiratory  focusing on inspiratory muscle strengthening with minimal assistance 90% of the time.  Patient will demonstrate abdominal focused breathing technique in targeted exercises with minimal assistance 90% of the time.?   Patient will demonstrate abdominal focused breathing technique with physical activity with minimal assistance 90% of the time.  Gradually reduce inappropriate and excessive inhaler use  Demonstrate a 50% reduction in Dyspnea Index score  Patient will express satisfaction with their breathing and their ability to participate in social, personal, and work/school functions without limitation    Chronic Cough  Decrease cough in all activities of daily living using compensation strategies  Independently implement a cough suppression strategy when cough trigger is sensed 90% of the time.?   Decrease the number of coughs per day by 50%   Demonstrate increased tolerance of a chemical and/or environmental irritant as evidenced by increased exposure (decreased proximity and/or increased strength) to that irritant by 50%   Demonstrate a 50% reduction in Cough Severity Index score  Patient will express satisfaction with their coughing and their ability to participate in social, personal, and work/school functions without limitation     Voice  Coordinate phonatory and  respiratory subsystems, demonstrating adequate independence for activities of daily communication   Decrease extrinsic laryngeal muscle activity during communication events   Improve voice quality in all activities of daily living using, as measured by a minimum of a 50% point reduction on the Voice Handicap Index-10 or Singing VHI  Demonstrate appropriate vocal hygiene including, but not limited to, adequate hydration, avoiding vocal abuse, integrating behavioral and dietary changes for GERD into their daily life?.   Incorporate behaviors to reduce irritation and promote laryngeal healing and prevent recurrence.?   Implement behavioral strategies to reduce laryngopharyngeal reflux. ?   Independently maintain a forward focus voice placement 90% of the time during conversational speech.  Independently perform the Vocal Function Exercises, rebalancing respiration, phonation, and resonance 90% of the time  Perform High Level Phonation and Pitch Range Navigation Exercises independently 90% of the time  Patient will express satisfaction with their voice quality and function and their ability to participate in social, personal, and work/school functions without limitation    Perioperative  Adhere to complete vocal rest recommendations in the acute post-operative period  Gradually increase voice use following the complete voice rest period  Adhere to vocal hygiene recommendations including smoking cessation, hydration, avoidance of caffeine and alcohol, and behavioral reflux precautions  Perform rescue breathing techniques several times daily  Incorporate SOVT exercises gradually in the immediate post-operative period   Participate in pre- and post-operative voice therapy      Billed Procedures:    Laryngoscopy without stroboscopy 01892  Laryngoscopy with stroboscopy 92869  Individual speech therapy session 52060  Perceptual voice assessment 91095  Laryngeal function studies 99366 with 52 modifier  Assessment and treatment  "time: *** minutes  Chart review, interpretation of testing, documentation preparation, etc: *** minutes      Thank you for allowing me to participate in this patient's care,    ITALIA [email or web address].[email or web address], M.M., CCC-SLP  Speech-Language Pathologist  [facility] Trained Vocologist  [facility]  [email or web address]  Pronouns: she/her      *this report was created in part through the use of computerized dictation software, and though reviewed following completion, some typographic errors may persist.  If there is confusion regarding any of this notes contents, please contact me for clarification        ASSESSMENT / PLAN  IMPRESSIONS: Joel Pineda is a 49 year old ***, presenting today with {[facility] ENT-ICD-10 CODES:425211} in the context of {[facility] ENT-ICD-10 CODES:732402}, as evidenced by evaluation the results of the *** laryngeal function studies, as well as the laryngeal exam.    Remarkable findings included:  Perceptual evaluation demonstrated: ***  Laryngeal exam demonstrated: ***  Primary complaint of patient today included: ***    Additional assessment based discussion included:  ***    Therefore, we recommended a course of speech therapy to address these concerns.    ***{DYSPHONIA:179798541}. Laryngeal function studies show significant ***increase in trans-glottal airflow compared to age and gender matched norms, ***increased inferred subglottal pressure, and significantly ***elevated AVQI.    STIMULABILITY: results of therapy probes during perceptual and laryngeal evaluation demonstrate improvement with {Therapy Probe Response:993623}    RECOMMENDATIONS:   {Therapy recommendations:411301}  He demonstrates a {PROGNOSIS:228588186::\"Good\"} prognosis for improvement given adherence to therapeutic recommendations.   Positive indicators: {[facility] Positive Prognostic Indicators:863333}  Negative indicators: ***  Research: This patient is ***willing to be contacted about participation in research. May be eligible for *** " "study.    DURATION / FREQUENCY: *** one-hour sessions ***2 one-hour weekly sessions, followed by 2 one-hour biweekly sessions.  A total of 6-8 sessions may be necessary.    ***GOALS:  Patient goal:   {GOALS:011598964}    Short-term goal(s): Within the first 4 sessions, Mr. Pineda:  {DWLVCGOALS:599307}    Long-term goal(s): In *** months, Mr. Pineda will:  {LONG TERM GOALS:778587937}    This treatment plan was developed with the patient who agreed with the recommendations.    ***SAMEDAY THERAPY NOTE GOES HERE, USE .DWSPTXSAMEDAY***    TOTAL SERVICE TIME: *** minutes  {LOS:375862::\"NO CHARGE FACILITY FEE (14231)\"}  {SLPLOS:469769}      Again, thank you for allowing me to participate in the care of your patient.      Sincerely,    Speech Language Pathologist    "

## 2025-01-25 DIAGNOSIS — E78.1 HYPERTRIGLYCERIDEMIA: ICD-10-CM

## 2025-01-25 DIAGNOSIS — M51.362 DEGENERATION OF INTERVERTEBRAL DISC OF LUMBAR REGION WITH DISCOGENIC BACK PAIN AND LOWER EXTREMITY PAIN: ICD-10-CM

## 2025-01-26 SDOH — HEALTH STABILITY: PHYSICAL HEALTH: ON AVERAGE, HOW MANY DAYS PER WEEK DO YOU ENGAGE IN MODERATE TO STRENUOUS EXERCISE (LIKE A BRISK WALK)?: 0 DAYS

## 2025-01-26 SDOH — HEALTH STABILITY: PHYSICAL HEALTH: ON AVERAGE, HOW MANY MINUTES DO YOU ENGAGE IN EXERCISE AT THIS LEVEL?: 0 MIN

## 2025-01-26 ASSESSMENT — ASTHMA QUESTIONNAIRES
QUESTION_4 LAST FOUR WEEKS HOW OFTEN HAVE YOU USED YOUR RESCUE INHALER OR NEBULIZER MEDICATION (SUCH AS ALBUTEROL): THREE OR MORE TIMES PER DAY
QUESTION_3 LAST FOUR WEEKS HOW OFTEN DID YOUR ASTHMA SYMPTOMS (WHEEZING, COUGHING, SHORTNESS OF BREATH, CHEST TIGHTNESS OR PAIN) WAKE YOU UP AT NIGHT OR EARLIER THAN USUAL IN THE MORNING: TWO OR THREE NIGHTS A WEEK
QUESTION_2 LAST FOUR WEEKS HOW OFTEN HAVE YOU HAD SHORTNESS OF BREATH: MORE THAN ONCE A DAY
ACT_TOTALSCORE: 9
ACT_TOTALSCORE: 9
QUESTION_5 LAST FOUR WEEKS HOW WOULD YOU RATE YOUR ASTHMA CONTROL: POORLY CONTROLLED
QUESTION_1 LAST FOUR WEEKS HOW MUCH OF THE TIME DID YOUR ASTHMA KEEP YOU FROM GETTING AS MUCH DONE AT WORK, SCHOOL OR AT HOME: SOME OF THE TIME

## 2025-01-26 ASSESSMENT — SOCIAL DETERMINANTS OF HEALTH (SDOH): HOW OFTEN DO YOU GET TOGETHER WITH FRIENDS OR RELATIVES?: ONCE A WEEK

## 2025-01-27 ENCOUNTER — TELEPHONE (OUTPATIENT)
Dept: SPEECH THERAPY | Facility: CLINIC | Age: 50
End: 2025-01-27

## 2025-01-27 ENCOUNTER — OFFICE VISIT (OUTPATIENT)
Dept: PALLIATIVE MEDICINE | Facility: OTHER | Age: 50
End: 2025-01-27
Attending: NURSE PRACTITIONER
Payer: MEDICARE

## 2025-01-27 VITALS
BODY MASS INDEX: 36.15 KG/M2 | SYSTOLIC BLOOD PRESSURE: 130 MMHG | WEIGHT: 297 LBS | OXYGEN SATURATION: 100 % | HEART RATE: 79 BPM | DIASTOLIC BLOOD PRESSURE: 81 MMHG

## 2025-01-27 DIAGNOSIS — M47.817 LUMBOSACRAL SPONDYLOSIS WITHOUT MYELOPATHY: ICD-10-CM

## 2025-01-27 DIAGNOSIS — M45.8 ANKYLOSING SPONDYLITIS OF SACRAL REGION (H): ICD-10-CM

## 2025-01-27 DIAGNOSIS — G44.89 CHRONIC MIXED HEADACHE SYNDROME: ICD-10-CM

## 2025-01-27 DIAGNOSIS — M47.816 SPONDYLOSIS OF LUMBAR REGION WITHOUT MYELOPATHY OR RADICULOPATHY: ICD-10-CM

## 2025-01-27 DIAGNOSIS — Z79.891 LONG TERM (CURRENT) USE OF OPIATE ANALGESIC: ICD-10-CM

## 2025-01-27 DIAGNOSIS — G89.29 CHRONIC INTRACTABLE PAIN: Primary | ICD-10-CM

## 2025-01-27 LAB
CREAT UR-MCNC: 59 MG/DL
ETHANOL UR QL SCN: NORMAL

## 2025-01-27 PROCEDURE — 99214 OFFICE O/P EST MOD 30 MIN: CPT | Performed by: NURSE PRACTITIONER

## 2025-01-27 PROCEDURE — 80307 DRUG TEST PRSMV CHEM ANLYZR: CPT | Performed by: NURSE PRACTITIONER

## 2025-01-27 PROCEDURE — 80363 OPIOIDS & OPIATE ANALOGS 3/4: CPT | Performed by: NURSE PRACTITIONER

## 2025-01-27 PROCEDURE — G0463 HOSPITAL OUTPT CLINIC VISIT: HCPCS | Performed by: NURSE PRACTITIONER

## 2025-01-27 PROCEDURE — 80365 DRUG SCREENING OXYCODONE: CPT | Performed by: NURSE PRACTITIONER

## 2025-01-27 RX ORDER — ROSUVASTATIN CALCIUM 40 MG/1
40 TABLET, COATED ORAL DAILY
Qty: 90 TABLET | Refills: 0 | Status: SHIPPED | OUTPATIENT
Start: 2025-01-27

## 2025-01-27 RX ORDER — METHOCARBAMOL 500 MG/1
TABLET, FILM COATED ORAL
Qty: 240 TABLET | Refills: 0 | Status: SHIPPED | OUTPATIENT
Start: 2025-01-27

## 2025-01-27 ASSESSMENT — PAIN SCALES - GENERAL: PAINLEVEL_OUTOF10: MILD PAIN (3)

## 2025-01-27 NOTE — TELEPHONE ENCOUNTER
Left Voicemail (1st Attempt) for the patient to call back and schedule the following:    Appointment type: VFSS + Esophogram  Provider: Heather Garcia Gina  Return date:      Specialty phone number: writer's direct  Additional appointment(s) needed: New SLP Voice (In person) w/ one voice slp  Additional Notes: referred by Dr. Sukumar Santiago on 1/27/2025 at 2:13 PM

## 2025-01-27 NOTE — PATIENT INSTRUCTIONS
After Visit Instructions:     Thank you for coming to [facility] for your care. It is my goal to partner with you to help you reach your optimal state of health.   Continue daily self-care, identifying contributing factors, and monitoring variations in pain level. Continue to integrate self-care into your life.      30 minutes Video or Clinic follow-up with JOCELYN Zavala NP-C in 3 months or sooner as needed   Physical Therapy order provided.   Labs: Annual requirements   Medication Management :   Narcan refilled      JOCELYN Padgett NP-C  [facility]  [facility] - Monday, Thursday and Friday  [place] [facility] - Tuesday      Be sure to request ALL medication refills 5 days prior to the due date whether or not you will see your medical provider in an appointment before the due date.      Do not expect same day refills. If you do not plan ahead you may run out of medications.     Early refills are not provided.  It is your responsibility to manage your medications responsibility and keep them safely stored. Lost or destroyed medications WILL NOT be replaced    Scheduling/Clinic telephone Number for ALL locations:  [phone]    After Hours On-Call Service:  [phone]    Call with any questions about your care and for scheduling assistance.   Calls are returned Monday through Friday between 8 AM and 4:00 PM. We usually get back to you within 2 business days depending on the issue/request.    If we are prescribing your medications:  For opioid medication refills, call the clinic or send a GENWI message 7 days in advance.  Please include:  Your name and date of birth.   Name of requested medication  Name of the pharmacy.  For non-opioid medications, call your pharmacy directly to request a refill. Please allow 3-4 days to be processed.   Per [place] State Law:  All controlled substance prescriptions must be filled within 30 days of being written.    For those  controlled substances allowing refills, pickup must occur within 30 days of last fill.      We believe regular attendance is key to your success in our program!    Any time you are unable to keep your appointment we ask that you call us at least 24 hours in advance to cancel.This will allow us to offer the appointment time to another patient.   Multiple missed appointments may lead to dismissal from the clinic.

## 2025-01-27 NOTE — PROGRESS NOTES
[facility]    CHIEF COMPLAINT: Chronic Pain.    INTERVAL HISTORY:  Last seen on 11/22/24.       Recommendations/plan at the last visit included:  Psychology: YES, Continue per your psychologist's recommendations.  Physical therapy: YES, Continue per therapist's recommendations.   30 minute Video or Clinic follow-up with JOCELYN Zavala NP-C on January 27, 2025 at 9:30 am  Procedures recommended: Bilateral  lateral femoral block ordered with Dr Montgomery    Medication Management :   Percocet 5/325 mg #12 tabs for acute pain only   Continue remainder of regular medication regimen.     Since last visit:   - Going to [facility] for bowel incontinence, doing PT.   - Seeing [facility] for Migraines. Doing Botox 31 sites, 5 units.   - Has been going to PT for Orthology     Pain Information today: Mild Pain (3)/10. Location of pain: low back.    Annual Requirements last collected: January 27, 2025      Current Pain Relevant Medications:    Acetaminophen 500 mg BID & with Oxycodone.   Cymbalta 120 mg in AM  Lyrica 300 MG BID   Methocarbamol 500 mg 1-2 QID PRN  Nabumetone 500 mg 1-2 times a day  Lidocaine gel topically 2-3 times daily  Risperdal 0.5 mg 1-2 x daily      Pain Related Controlled Substances:   Clonazepam 0.5 mg, 2 tabs at HS.  Lunesta 3 mg  Oxycodone 5 mg 1-2 tabs per day PRN              Total opiate dose: 7.5-15 MME     Previous Pain Relevant Medications: (H--helped; HI--Helped initially; [facility]--Somewhat helpful; NH--No help; W--worse; SE--side effects; ?--Unsure if helpful)   NOTE: This medication information taken from patient's intake form, not medical records.   Opiates: Oxycodone: H, [facility]adol:[facility]. SE, Codeine: NH  NSAIDS: Celebrex: [facility], Nabumetone:H, Naproxen: SE  Anti-migraine medications: Midrin? , Maxalt:H  Muscle Relaxants: Baclofen:[facility], Flexeril:H, Tizaidine:?, Methocarbamol:?  Neuropathics: Lyrica:H  Anti-depressants: Abilify:SE, Brintellix: NH, Wellbutrin: ?,  Cymbalta: NH, Nortriptyline: NH, Seroquel:   [facility], Risperdal: NH, Effexor:?, Cymbalta ?  Anxiety medications: Xanax: H, Klonpin: H, lorazepam: H      Topicals: Lidocaine:H  Sleep medications:Belsomra: NH, Melatonin:H, Trazodone NH, Ambien:NH  Other medications not covered above: Humira: All, Enbrel; H, dicyclomine:H, Medrol:[facility], Prednisone:H     Any illicit drug use: denies   EtOH use: none   Caffeine use: 6-8 per day   Nicotine use: None     Past Pain Treatments:   Pain Clinic:   Yes  [facility] pain management: For spinal injections with Dr Diaz, [facility]: injections, nerve ablation, [facility] for SCS treatment.  Did trial but did not find it beneficial.   [facility] chronic pain program.    PT: Yes  For other reasons. Neck, back and feet  Psychologist: Yes ongoing with private therapist.at  [facility].   Chiropractor: Yes years ag              Acupuncture: Yes NH  Pharmacotherapy:  Opioids: Yes  Non-opioids:    Yes   TENs Unit:Yes H for back   Injections: Yes Many for neck and back over the years.      Surgeries related to pain: Yes   October 2007 L5-S1 laminectomy, micro discectomy          THE 4 As OF OPIOID MAINTENANCE ANALGESIA    Analgesia: Is pain relief clinically significant? YES   Activity: Is patient functional and able to perform Activities of Daily Living? YES   Adverse effects: Is patient free from adverse side effects from opiates? YES   Adherence to Rx protocol: Is patient adhering to Controlled Substance Agreement and taking medications ONLY as ordered? YES     Is Narcan prescribed for opiate use >50 MME daily or concurrent use of opiates and benzodiazepines?  Yes    [place] Board of Pharmacy Data Base Reviewed:    YES; No concern for abuse or misuse of controlled medications based on this report. Reviewed [facility] January 26, 2025- no concerning fills.      PHYSICAL EXAM    Vitals:    01/27/25 0920   BP: 130/81   Pulse: 79   SpO2: 100%   Weight: 134.7 kg (297 lb)       Constitutional: healthy, alert,  and no distress A&O.   Patient is appropriate.  Psychiatric/mental status: Alert, without lethargy or stupor. Appropriate affect.     Neurologic exam:  CN:  Cranial nerves 2-12 are grossly normal.    MUSCULOSKELETAL:     Posture: Upright, shoulders and pelvis are leveled. Yes  Antalgic Gait Pattern?: Yes     DIRE Score for ongoing opioid management is calculated as follows:    Diagnosis = 3 pts (advanced condition; severe pain/objective findings)    Intractability = 3 pts (patient fully engaged but inadequate response to treatments)    Risk        Psych = 3 pts (no significant personality dysfunction/mental illness; good communication with clinic)         Chem Hlth = 3 pts (no history of chemical dependency; not drug-focused)       Reliability = 2 pts (occasional difficulties with compliance; generally reliable)       Social = 2 pts (reduction in some relationships/life rolls)       (Psych + Chem hlth + Reliability + Social) = 16    Efficacy = 2 pts (moderate benefit/function; low med dose; too early/not tried meds)    DIRE Score = 18        7-13: likely NOT suitable candidate for long-term opioid analgesia       14-21: may be a suitable candidate for long-term opioid analgesia     Previous Diagnostic Tests:   Imaging Studies:      7/17/2024: MR LUMBAR SPINE W/O CONTRAST   IMPRESSION: Interval progression of multilevel lumbar spondylosis, most pronounced at L4-L5 and L5-S1, when compared to 6/27/2019. There is moderate to severe left neuroforaminal stenosis at L4-L5 with  abutment of the exiting left L4 nerve. Additional moderate to severe  bilateral neuroforaminal stenosis with abutment of the exiting  bilateral L5 nerves at L5-S1 and abutment of the descending left S1  nerve root.         PAIN RELEVANT CONDITIONS:   1.  Ankylosing spondylosis, Lumbar DDD with left S1 nerve involvement, lumbar stenosis  2.  Chronic migraine headaches  3.  Chronic cervical pain, DDD  4.  Peripheral small fiber  neuropathy    DIAGNOSIS AND PLAN:     (G89.29) Chronic intractable pain  (primary encounter diagnosis)  (M45.8) Ankylosing spondylitis of sacral region (H)  (M47.817) Lumbosacral spondylosis without myelopathy  (G44.89) Chronic mixed headache syndrome  (M47.816) Spondylosis of lumbar region without myelopathy or radiculopathy  Comment: Overall Tito is doing very well, continues to treat for multiple medical conditions. In therapy for pelvic floor strengthening, dysphagia and low back pain. New PT order provided for back pain. Continue current medications and plan of care.   Plan: Physical Therapy  Referral    (Z79.891) Long term (current) use of opiate analgesic  Comment: Annual required labs, CSA, Narcan provided due to multiple medications which can depress respiratory drive.   Plan:   naloxone (NARCAN) 4 MG/0.1ML nasal spray, Drug Confirmation Panel Urine with Creat  Ethanol urine      PATIENT INSTRUCTIONS:     Diagnosis reviewed, treatment option addressed, and risk/benefits discussed.  Self-care instructions given.  I am recommending a multidisciplinary treatment plan to help this patient better manage pain.    Remember to request ALL medication refills 5 BUSINESS days before you run out.     30 minutes Video or Clinic follow-up with JOCELYN Zavala NP-C in 3 months or sooner as needed   Physical Therapy order provided.   Labs: Annual requirements   Medication Management :   Narcan refilled    I have reviewed the note as documented above.  This accurately captures the substance of my conversation with the patient.  A total of 28 minutes of preparation, care, and consultation were spent on this visit today.     JOCELYN Padgett, NPLeydaC  [facility]    (Information in italics and blue color are taken from previous pain and consulting medical providers notes and are documented as such)

## 2025-01-27 NOTE — PROGRESS NOTES
Patient presents to the clinic today for a visit  with JOCELYN Ron CNP            10/28/2024     3:42 PM 11/22/2024    11:00 AM 1/27/2025     9:27 AM   PEG Score   PEG Total Score 5.67 3.67 3.33       UDS/CSA-1/27/25    Medications-oxycodone     QUESTIONS:    Sneha GUERRIER [facility] [place]

## 2025-01-27 NOTE — LETTER

## 2025-01-29 ENCOUNTER — VIRTUAL VISIT (OUTPATIENT)
Dept: PSYCHOLOGY | Facility: CLINIC | Age: 50
End: 2025-01-29
Payer: MEDICARE

## 2025-01-29 ENCOUNTER — OFFICE VISIT (OUTPATIENT)
Dept: FAMILY MEDICINE | Facility: CLINIC | Age: 50
End: 2025-01-29
Payer: MEDICARE

## 2025-01-29 VITALS
HEART RATE: 91 BPM | BODY MASS INDEX: 34.1 KG/M2 | SYSTOLIC BLOOD PRESSURE: 118 MMHG | HEIGHT: 77 IN | TEMPERATURE: 97.8 F | WEIGHT: 288.8 LBS | OXYGEN SATURATION: 98 % | DIASTOLIC BLOOD PRESSURE: 81 MMHG | RESPIRATION RATE: 20 BRPM

## 2025-01-29 DIAGNOSIS — F11.90 CHRONIC, CONTINUOUS USE OF OPIOIDS: ICD-10-CM

## 2025-01-29 DIAGNOSIS — Z12.5 PROSTATE CANCER SCREENING: ICD-10-CM

## 2025-01-29 DIAGNOSIS — F41.1 GENERALIZED ANXIETY DISORDER: ICD-10-CM

## 2025-01-29 DIAGNOSIS — F33.0 MAJOR DEPRESSIVE DISORDER, RECURRENT EPISODE, MILD: ICD-10-CM

## 2025-01-29 DIAGNOSIS — E78.5 HYPERLIPIDEMIA LDL GOAL <70: ICD-10-CM

## 2025-01-29 DIAGNOSIS — E66.01 MORBID OBESITY (H): ICD-10-CM

## 2025-01-29 DIAGNOSIS — F33.1 MAJOR DEPRESSIVE DISORDER, RECURRENT EPISODE, MODERATE (H): Primary | ICD-10-CM

## 2025-01-29 DIAGNOSIS — Z00.00 ENCOUNTER FOR MEDICARE ANNUAL WELLNESS EXAM: Primary | ICD-10-CM

## 2025-01-29 DIAGNOSIS — G90.1 DYSAUTONOMIA (H): ICD-10-CM

## 2025-01-29 DIAGNOSIS — E11.8 TYPE 2 DIABETES MELLITUS WITH COMPLICATION, WITHOUT LONG-TERM CURRENT USE OF INSULIN (H): ICD-10-CM

## 2025-01-29 DIAGNOSIS — E11.43 TYPE 2 DIABETES MELLITUS WITH DIABETIC AUTONOMIC NEUROPATHY, WITHOUT LONG-TERM CURRENT USE OF INSULIN (H): ICD-10-CM

## 2025-01-29 DIAGNOSIS — I10 HYPERTENSION GOAL BP (BLOOD PRESSURE) < 140/90: ICD-10-CM

## 2025-01-29 DIAGNOSIS — E03.9 ACQUIRED HYPOTHYROIDISM: ICD-10-CM

## 2025-01-29 LAB
7AMINOCLONAZEPAM UR QL CFM: PRESENT
OXYCODONE UR CFM-MCNC: 181 NG/ML
OXYCODONE/CREAT UR: 307 NG/MG {CREAT}
PREGABALIN UR QL CFM: PRESENT
PSA SERPL DL<=0.01 NG/ML-MCNC: 0.09 NG/ML (ref 0–3.5)

## 2025-01-29 PROCEDURE — G0103 PSA SCREENING: HCPCS | Performed by: FAMILY MEDICINE

## 2025-01-29 PROCEDURE — G0439 PPPS, SUBSEQ VISIT: HCPCS | Performed by: FAMILY MEDICINE

## 2025-01-29 PROCEDURE — 36415 COLL VENOUS BLD VENIPUNCTURE: CPT | Performed by: FAMILY MEDICINE

## 2025-01-29 ASSESSMENT — ACTIVITIES OF DAILY LIVING (ADL): CURRENT_FUNCTION: NO ASSISTANCE NEEDED

## 2025-01-29 ASSESSMENT — PAIN SCALES - GENERAL: PAINLEVEL_OUTOF10: MODERATE PAIN (4)

## 2025-01-29 NOTE — PROGRESS NOTES
[facility]                                     Progress Note    Patient Name: Joel Pineda  Date: January 29, 2025             Service Type: Individual      Session Start Time: 2:00 PM Session End Time: 2:55 PM      Session Length: 55 minutes    Session #: 12    Attendees: Client attended alone    Service Modality:  Video Visit:      Provider verified identity through the following two step process.  Patient provided:  Patient photo and Patient is known previously to provider    Telemedicine Visit: The patient's condition can be safely assessed and treated via synchronous audio and visual telemedicine encounter.      Reason for Telemedicine Visit: Patient has requested telehealth visit    Originating Site (Patient Location): Patient's home    Distant Site (Provider Location): Provider Remote Setting- Home Office    Consent:  The patient/guardian has verbally consented to: the potential risks and benefits of telemedicine (video visit) versus in person care; bill my insurance or make self-payment for services provided; and responsibility for payment of non-covered services.     Patient would like the video invitation sent by:  Text to cell phone: [phone]    Mode of Communication:  Video Conference via [facility]    Distant Location (Provider):  Off-site    As the provider I attest to compliance with applicable laws and regulations related to telemedicine.    DATA  Interactive Complexity: Yes, visit entailed Interactive Complexity evidenced by: Patient's presenting concerns require more intensive intervention than could be completed within the usual service. Provider used clinical judgment in determining the necessary length for the session to benefit the patient's needs.     Crisis: No        Progress Since Last Session (Related to Symptoms / Goals / Homework):   Symptoms: No change reports some passive suicidal ideation that flared when he felt worried about potential diagnoses after ENT  visit - reports absence of intent or plan.    Homework: Achieved / completed to satisfaction - using DBT skills as discussed - cope ahead, non-judgmental stance, check the facts      Episode of Care Goals: Satisfactory progress - CONTEMPLATION (Considering change and yet undecided); Intervened by assessing the negative and positive thinking (ambivalence) about behavior change     Current / Ongoing Stressors and Concerns:   Tito has been referred for [facility] therapy by ENT for his dysphonia - he reports concern about whether he has bulbar ALS, which is fairly rare. He reports feeling the ENT would whisper/confer with colleagues and didn't share these findings - this triggered passive suicidal thoughts, he reports absence of plan or intent and was reminded of his safety plan should these thoughts excalate or worsen. Shared about biofeedback suggestions. Update on [employer] - reports new people joined Board, frustrated they have not been very active in [employer] until recently.  made it clear Tito is the only one who can directly communicate with manager. Discussed choosing to focus on the positives of having additional support on the Board, delegate tasks. Recognizing he has skills to manage conflict with new Board members, as he can feel very 'triggered' since two members remind him of his step-father. Explored using non-judgmental stance when dealing with certain Board members.         Treatment Objective(s) Addressed in This Session:   use thought-stopping strategy daily to reduce intrusive thoughts  state understanding of stressors and relationship to physical symptoms  Feel less tired and more energy during the day   Identify negative self-talk and behaviors: challenge core beliefs, myths, and actions  Decrease thoughts that you'd be better off dead or of suicide / self-harm  reduce their emotional vulnerability by 40% during the Emotion Regulation Module (6-8 weeks)   Updated treatment  plan     Intervention:      CBT: cognitive challenging, restructuring, reframing Behavioral activation techniques, grounding strategies  PCT: supportive autonomy, unconditional positive regard, empathic reflection, active listening  DBT: non-judgmental stance, check the facts, coping ahead  Updated treatment plan    Assessments completed prior to visit:  The following assessments were completed by patient for this visit:  The following assessments were completed by patient for this visit:  PHQ9:       10/29/2024     4:30 PM 12/3/2024    12:12 PM 12/10/2024     2:20 PM 12/17/2024     2:51 PM 1/1/2025     3:48 PM 1/14/2025     9:37 AM 1/28/2025     8:32 AM   PHQ-9 SCORE   PHQ-9 Total Score MyChart 14 (Moderate depression) 10 (Moderate depression) 10 (Moderate depression) 15 (Moderately severe depression) 8 (Mild depression) 10 (Moderate depression) 10 (Moderate depression)   PHQ-9 Total Score 14  10  10  15  8  10  10        Patient-reported     GAD7:       4/22/2024    10:44 AM 7/18/2024     9:53 AM 8/1/2024     9:28 AM 10/20/2024     2:25 PM 12/3/2024    12:14 PM 12/17/2024     2:51 PM 1/1/2025     3:50 PM   YUDI-7 SCORE   Total Score 8 (mild anxiety) 11 (moderate anxiety) 7 (mild anxiety) 7 (mild anxiety) 9 (mild anxiety) 12 (moderate anxiety) 8 (mild anxiety)   Total Score 8 11 7 7 9  12  8        Patient-reported         ASSESSMENT: Current Emotional / Mental Status (status of significant symptoms):   Risk status (Self / Other harm or suicidal ideation)   Patient reports the following current fears or concerns for personal safety: Ongoing passive thoughts of suicide with absence of plan or intent and agreed to follow his previously developed [place] Safety Plan.   Patient denies current or recent suicidal ideation or behaviors.   Patient denies current or recent homicidal ideation or behaviors.   Patient denies current or recent self injurious behavior or ideation.   Patient denies other safety  concerns.   Patient reports there has been no change in risk factors since their last session.     Patient reports there has been no change in protective factors since their last session.     A safety and risk management plan has been developed including: Patient consented to co-developed safety plan on 10/21/2024.  Safety and risk management plan was reviewed.   Patient agreed to use safety plan should any safety concerns arise.  A copy was made available to the patient.     Appearance:   Appropriate    Eye Contact:   Fair    Psychomotor Behavior: Normal    Attitude:   Cooperative    Orientation:   All   Speech    Rate / Production: Normal     Volume:  Normal    Mood:    Anxious  Depressed  Sad    Affect:    Appropriate  Stressed    Thought Content:  Clear    Thought Form:  Coherent  Logical    Insight:    Good      Medication Review:   No changes to current psychiatric medication(s) Reports psychiatrist encouraged him to take Klonopin more regularly.       Medication Compliance:   Yes - may not take some PRNs as often as he could     Changes in Health Issues:   None reported     Chemical Use Review:   Substance Use: Chemical use reviewed, no active concerns identified      Tobacco Use: No current tobacco use.      Diagnosis:  296.32 (F33.1) Major Depressive Disorder, Recurrent Episode, Moderate _.  300.02 (F41.1) Generalized Anxiety Disorder.    Collateral Reports Completed:   Not Applicable    PLAN: (Patient Tasks / Therapist Tasks / Other)    Patient to continue to use non-judgmental stance and cope ahead skills.     Patient and provider will continue practicing how to reframe automatic thinking from negative situations.     Patient will practice radical self-compassion techniques to decrease shame.     Shirley Bartlett PsyD LP                                                         ______________________________________________________________________    Individual Treatment Plan    Patient's Name: Joel SCOTT  Miriam  YOB: 1975    Date of Creation: October 30, 2024   Date Treatment Plan Last Reviewed/Revised: January 29, 2025      DSM5 Diagnoses: 296.32 (F33.1) Major Depressive Disorder, Recurrent Episode, Moderate _ or 300.02 (F41.1) Generalized Anxiety Disorder  Psychosocial / Contextual Factors: recent surgery and resulting acute pain, chronic pain, chronic mental health concerns, mobility limitations  PROMIS (reviewed every 90 days):   PROMIS-10 Scores        10/20/2024     2:19 PM 11/6/2024    12:19 PM 1/15/2025    12:04 PM   PROMIS-10 Total Score w/o Sub Scores   PROMIS TOTAL - SUBSCORES 19 18  21        Patient-reported        Referral / Collaboration:  Referral to another professional/service is not indicated at this time..    Anticipated number of session for this episode of care: 9-12 sessions  Anticipation frequency of session: Weekly  Anticipated Duration of each session: 53 or more minutes  Treatment plan will be reviewed in 90 days or when goals have been changed.     MeasurableTreatment Goal(s) related to diagnosis / functional impairment(s)  Goal 1: Patient will decrease anxiety by 75% as evidenced by YUDI-7    I will know I've met my goal when I am at peace with where I'm at.       Objective #A (Patient Action)                          Patient will identify the situations / thoughts that contribute to feeling anxious.  Status: Continued - Date(s):1/29/2025       Intervention(s)  Therapist will process anxiety-proving emotions.     Objective #B  Patient will use at least 3 coping skills for anxiety management in the next 4 weeks.  Status: Continued - Date(s):1/29/2025        Intervention(s)  Therapist will assign homework : coping skills practice to decrease anxiety .     Objective #C  Patient will use cognitive strategies identified in therapy to challenge anxious thoughts.  Status: Continued - Date(s):1/29/2025        Intervention(s)  Therapist will  teach the patient ways to reframe negative  core beliefs that contribute to anxiety.     Goal 2 Patient will report pain levels are consistently rated at 2/10 per patient report.   I will know I've met my goal when my pain is less disruptive.      Objective #A (Patient Action)    Patient will identify 5 strategies for managing pain.  Status: Continued - Date(s):1/29/2025     Intervention(s)  Therapist will teach  strategies to manage pain and assign homework to use 3 strategies daily .    Objective #B  Patient will  engage in physical activity and PT exercises 2-3 times daily .  Status: Continued - Date(s):1/29/2025      Intervention(s)  Therapist will assign homework to engage in at minimum one PT or physical activity daily .    Objective #C  Patient will Identify negative self-talk and behaviors: challenge core beliefs, myths, and actions.  Status: Continued - Date(s):1/29/2025      Intervention(s)  Therapist will teach non-judgmental stance and help patient reframe negative self-talk  .      Goal 3: Patient will report reduction in depressive symptoms as evidenced by PHQ-9 score reduction of 75% or greater.     I will know I've met my goal when I feel like I have purpose in life.      Objective #A (Patient Action)    Status: Continued - Date(s):1/29/2025      Patient will Decrease frequency and intensity of feeling down, depressed, hopeless.    Intervention(s)  Therapist will teach emotional recognition/identification. Therapist will reinforce use of emotion regulation skills .    Objective #B  Patient will use healthy communication when describing his emotions or when setting boundaries with others.     Status: Continued - Date(s):1/29/2025      Intervention(s)  Therapist will teach Therapist will teach emotional regulation skills and interpersonal effectiveness skills to improve quality of communication.     Objective #C  Patient will track and record at least 3 pleasant exchanges with family members such as mother, father .  Status: Continued -  Date(s):1/29/2025     Intervention(s)  Therapist will teach about healthy boundaries. Therapist will encourage patient to focus on positive interactions with family and use cope ahead to increase likelihood of pleasant experience as an outcome .    Patient has reviewed and agreed to the above plan.      Shirley Bartlett PsyD LP  January 29, 2025    Answers submitted by the patient for this visit:  Patient Health Questionnaire (Submitted on 12/3/2024)  If you checked off any problems, how difficult have these problems made it for you to do your work, take care of things at home, or get along with other people?: Very difficult  PHQ9 TOTAL SCORE: 10  Patient Health Questionnaire (G7) (Submitted on 12/3/2024)  YUDI 7 TOTAL SCORE: 9    Answers submitted by the patient for this visit:  Patient Health Questionnaire (Submitted on 12/17/2024)  If you checked off any problems, how difficult have these problems made it for you to do your work, take care of things at home, or get along with other people?: Very difficult  PHQ9 TOTAL SCORE: 15  Patient Health Questionnaire (G7) (Submitted on 12/17/2024)  YUDI 7 TOTAL SCORE: 12    Answers submitted by the patient for this visit:  Patient Health Questionnaire (Submitted on 1/14/2025)  If you checked off any problems, how difficult have these problems made it for you to do your work, take care of things at home, or get along with other people?: Somewhat difficult  PHQ9 TOTAL SCORE: 10

## 2025-01-29 NOTE — PROGRESS NOTES
"Preventive Care Visit  [facility] Shady Lyles MD, Family Medicine  Jan 29, 2025      Assessment & Plan     Encounter for Medicare annual wellness exam  Routine health maintenance otherwise up-to-date.  He does not actually need an updated COVID or Pneumovax shot.  Is allergic to influenza.  Will adjust health maintenance accordingly.    Type 2 diabetes mellitus with complication, without long-term current use of insulin (H)  A1c under fantastic control.  Has transition from Ozempic to Mounjaro and we will continue to follow-up    Hypertension goal BP (blood pressure) < 140/90  Stable on current regimen.  Continue same plan and routine follow-up.     Hyperlipidemia LDL goal <70  Stable on current regimen.  Continue same plan and routine follow-up.     Acquired hypothyroidism  Stable on current regimen.  Continue same plan and routine follow-up.     Morbid obesity (H)  Weight continues to come down    Major depressive disorder, recurrent episode, mild  Stable and works with outside psychiatry    Generalized anxiety disorder  As above    Chronic, continuous use of opioids  Seeing Stacia Jack at the pain clinic    Prostate cancer screening    - PSA, screen; Future  - PSA, screen    Dysautonomia (H)  Following with neurology    Type 2 diabetes mellitus with diabetic autonomic neuropathy, without long-term current use of insulin (H)  Some neuropathy symptoms but should improve with better sugar control    Patient has been advised of split billing requirements and indicates understanding: Yes        BMI  Estimated body mass index is 34.7 kg/m  as calculated from the following:    Height as of this encounter: 1.943 m (6' 4.5\").    Weight as of this encounter: 131 kg (288 lb 12.8 oz).   Weight management plan: As above    Depression Screening Follow Up        1/28/2025     8:32 AM   PHQ   PHQ-9 Total Score 10    Q9: Thoughts of better off dead/self-harm past 2 weeks Several days   F/U: " "Thoughts of suicide or self-harm Yes   F/U: Self harm-plan No   F/U: Self-harm action No   F/U: Safety concerns No       Patient-reported           Follow Up Actions Taken      Discussed the following ways the patient can remain in a safe environment:    Counseling  Appropriate preventive services were addressed with this patient via screening, questionnaire, or discussion as appropriate for fall prevention, nutrition, physical activity, Tobacco-use cessation, social engagement, weight loss and cognition.  Checklist reviewing preventive services available has been given to the patient.  Reviewed patient's diet, addressing concerns and/or questions.   He is at risk for psychosocial distress and has been provided with information to reduce risk.   Updated plan of care.  Patient reported difficulty with activities of daily living were addressed today.The patient was provided with written information regarding signs of hearing loss.   The patient's PHQ-9 score is consistent with moderate depression. He was provided with information regarding depression.   I have reviewed Opioid Use Disorder and Substance Use Disorder risk factors and made any needed referrals.       Regular exercise  See Patient Instructions    Radha Francis is a 49 year old, presenting for the following:  Physical (Wellness/)        1/29/2025    10:59 AM   Additional Questions   Roomed by Carolina MARR   Accompanied by self           Healthy Habits:     In general, how would you rate your overall health?  Fair    Frequency of exercise:  None    Duration of exercise:  Other    Do you usually eat at least 4 servings of fruit and vegetables a day, include whole grains    & fiber and avoid regularly eating high fat or \"junk\" foods?  No    Taking medications regularly:  Yes    Barriers to taking medications:  Other (swallowing)    Medication side effects:  Not applicable    Ability to successfully perform activities of daily living:  No assistance needed    " Home Safety:  No safety concerns identified    Hearing Impairment:  No hearing concerns    In the past 6 months, have you been bothered by leaking of urine? Yes    In general, how would you rate your overall mental or emotional health?  Good    Additional concerns today:  No    Here today for routine wellness exam and follow-up on chronic issues        Health Care Directive  Patient does not have a Health Care Directive: Discussed advance care planning with patient; information given to patient to review.      1/26/2025   General Health   How would you rate your overall physical health? (!) FAIR   Feel stress (tense, anxious, or unable to sleep) Rather much   (!) STRESS CONCERN      1/26/2025   Nutrition   Diet: Other   If other, please elaborate: High sodium to manage POTS. Although, this gets tiresome.         1/26/2025   Exercise   Days per week of moderate/strenous exercise 0 days   Average minutes spent exercising at this level 0 min   (!) EXERCISE CONCERN      1/26/2025   Social Factors   Frequency of gathering with friends or relatives Once a week   Worry food won't last until get money to buy more No   Food not last or not have enough money for food? No   Do you have housing? (Housing is defined as stable permanent housing and does not include staying ouside in a car, in a tent, in an abandoned building, in an overnight shelter, or couch-surfing.) Yes   Are you worried about losing your housing? No   Lack of transportation? No   Unable to get utilities (heat,electricity)? No         1/29/2025   Fall Risk   Gait Speed Test (Document in seconds) 4.31   Gait Speed Test Interpretation Less than or equal to 5.00 seconds - PASS          1/26/2025   Activities of Daily Living- Home Safety   Needs help with the following daily activites Housework   Safety concerns in the home None of the above         1/26/2025   Dental   Dentist two times every year? Yes         1/26/2025   Hearing Screening   Hearing concerns? (!)  TROUBLE UNDERSTANDING SOFT OR WHISPERED SPEECH.         1/26/2025   Driving Risk Screening   Patient/family members have concerns about driving No         1/26/2025   General Alertness/Fatigue Screening   Have you been more tired than usual lately? No         1/26/2025   Urinary Incontinence Screening   Bothered by leaking urine in past 6 months No          Today's PHQ-9 Score:       1/28/2025     8:32 AM   PHQ-9 SCORE   PHQ-9 Total Score MyChart 10 (Moderate depression)   PHQ-9 Total Score 10        Patient-reported         1/26/2025   Substance Use   Alcohol more than 3/day or more than 7/wk Not Applicable   Do you have a current opioid prescription? (!) YES   How severe/bad is pain from 1 to 10? 4/10   Do you use any other substances recreationally? No       Social History     Tobacco Use    Smoking status: Never     Passive exposure: Never    Smokeless tobacco: Never   Vaping Use    Vaping status: Never Used   Substance Use Topics    Alcohol use: Not Currently     Comment: occ 1/week    Drug use: No     Comment: synthetic THC for nausea       ASCVD Risk   The 10-year ASCVD risk score (Katiana TORRES, et al., 2019) is: 7.7%    Values used to calculate the score:      Age: 49 years      Sex: Male      Is Non- : No      Diabetic: Yes      Tobacco smoker: No      Systolic Blood Pressure: 118 mmHg      Is BP treated: Yes      HDL Cholesterol: 34 mg/dL      Total Cholesterol: 155 mg/dL            Reviewed and updated as needed this visit by Provider   Tobacco  Allergies  Meds  Problems  Med Hx  Surg Hx  Fam Hx            Lab work is in process  Labs reviewed in EPIC  BP Readings from Last 3 Encounters:   01/29/25 118/81   01/27/25 130/81   01/24/25 119/83    Wt Readings from Last 3 Encounters:   01/29/25 131 kg (288 lb 12.8 oz)   01/27/25 134.7 kg (297 lb)   01/24/25 132.5 kg (292 lb)                  Current providers sharing in care for this patient include:  Patient Care  Team:  Ksenia Lyles MD as PCP - General (Family Practice)  Radha Mcdonald, [facility] as Pharmacist (Pharmacy)  Homa Davis MD as Psychiatrist  System, Provider Not In as Therapist (Clinic)  Ericka Mora RD as Diabetes Educator (Dietitian, Registered)  Radha Mcdonald, [facility] as MTM Pharmacist (Pharmacist)  Sue Gunter MD as Assigned Pulmonology Provider  Ksenia Lyles MD as Assigned PCP  Ramakrishna Badillo MD as MD (Neurology)  Anitha Farrell APRN CNP as Referring Physician (Colon & Rectal)  Janice Gonzales MD as MD (Dermatology)  Nixon Gallagher MD as MD (Neurological Surgery)  Radha Mcdonald [facility] as Assigned MTM Pharmacist  Stacia Jack APRN CNP as Nurse Practitioner (Pain Medicine)  Abbie Cuenca MD as Assigned Endocrinology Provider  Rian Diez MD as Physician (Ophthalmology)  Bubba Montgomery MD as MD (Pain Medicine)  Chico Jimenez MD as MD (Otolaryngology)  Tracy Barrera [facility] as   Shirley Bartlett PsyD (PSYCHOLOGIST COUNSELING)  Glory Lala PA-C as Physician Assistant (Surgery)  Shirley Bartlett PsyD as Assigned Behavioral Health Provider  Carolynn Pruett, [facility] as Pharmacist (Pharmacist)  Frantz Kaur MD as Assigned Rheumatology Provider  Stacia Jack APRN CNP as Assigned Pain Medication Provider  Onur Magaña MD as MD (Neurology)  Rian Diez MD as Physician (Ophthalmology)  Joel Damico MD as MD (Sleep Medicine)  No Ref-Primary, Physician  Joel Damico MD as Assigned Sleep Provider  Anitha Biggs MD as Surgeon (Neurological Surgery)  Peng Perez DPM as Assigned Surgical Provider  Robles Ballard [facility] as Pharmacist (Pharmacist)  Anitha Biggs MD as Assigned Neuroscience Provider    The following health maintenance items are reviewed in Epic and correct as of today:  Health Maintenance    Topic Date Due    Pneumococcal Vaccine: 50+ Years (4 of 4 - PCV20 or PCV21) 07/01/2023    INFLUENZA VACCINE (1) 09/01/2024    COVID-19 Vaccine (10 - 2024-25 season) 11/01/2024    DIABETIC FOOT EXAM  12/11/2024    URINE DRUG SCREEN  04/08/2025    MICROALBUMIN  04/13/2025    PHQ-9  04/29/2025    EYE EXAM  05/28/2025    BMP  06/30/2025    A1C  07/15/2025    ASTHMA CONTROL TEST  07/29/2025    TSH W/FREE T4 REFLEX  09/16/2025    ANNUAL REVIEW OF HM ORDERS  09/16/2025    ASTHMA ACTION PLAN  11/05/2025    YUDI ASSESSMENT  01/02/2026    LIPID  01/15/2026    CONTROLLED SUBSTANCE AGREEMENT FOR CHRONIC PAIN MANAGEMENT  01/27/2026    MEDICARE ANNUAL WELLNESS VISIT  01/29/2026    COLORECTAL CANCER SCREENING  05/01/2029    DTAP/TDAP/TD IMMUNIZATION (5 - Td or Tdap) 01/23/2030    ADVANCE CARE PLANNING  01/29/2030    RSV VACCINE (1 - 1-dose 75+ series) 07/01/2048    HEPATITIS C SCREENING  Completed    ZOSTER IMMUNIZATION  Completed    HEPATITIS B IMMUNIZATION  Completed    HIV SCREENING  Addressed    HPV IMMUNIZATION  Aged Out    MENINGITIS IMMUNIZATION  Aged Out    RSV MONOCLONAL ANTIBODY  Aged Out         Review of Systems  CONSTITUTIONAL: NEGATIVE for fever, chills, change in weight  INTEGUMENTARY/SKIN: NEGATIVE for worrisome rashes, moles or lesions  EYES: NEGATIVE for vision changes or irritation  ENT/MOUTH: NEGATIVE for ear, mouth and throat problems  RESP: NEGATIVE for significant cough or SOB  BREAST: NEGATIVE for masses, tenderness or discharge  CV: NEGATIVE for chest pain, palpitations or peripheral edema  GI: NEGATIVE for nausea, abdominal pain, heartburn, or change in bowel habits  : NEGATIVE for frequency, dysuria, or hematuria  MUSCULOSKELETAL: NEGATIVE for significant arthralgias or myalgia  NEURO: NEGATIVE for weakness, dizziness or paresthesias  ENDOCRINE: NEGATIVE for temperature intolerance, skin/hair changes  HEME: NEGATIVE for bleeding problems  PSYCHIATRIC: NEGATIVE for changes in mood or affect    "  Objective    Exam  /81 (BP Location: Right arm, Patient Position: Sitting, Cuff Size: Adult Large)   Pulse 91   Temp 97.8  F (36.6  C) (Oral)   Resp 20   Ht 1.943 m (6' 4.5\")   Wt 131 kg (288 lb 12.8 oz)   SpO2 98%   BMI 34.70 kg/m     Estimated body mass index is 34.7 kg/m  as calculated from the following:    Height as of this encounter: 1.943 m (6' 4.5\").    Weight as of this encounter: 131 kg (288 lb 12.8 oz).    Physical Exam  GENERAL: alert and no distress  EYES: Eyes grossly normal to inspection, PERRL and conjunctivae and sclerae normal  HENT: ear canals and TM's normal, nose and mouth without ulcers or lesions  NECK: no adenopathy, no asymmetry, masses, or scars  RESP: lungs clear to auscultation - no rales, rhonchi or wheezes  CV: regular rate and rhythm, normal S1 S2, no S3 or S4, no murmur, click or rub, no peripheral edema  ABDOMEN: soft, nontender, no hepatosplenomegaly, no masses and bowel sounds normal  MS: no gross musculoskeletal defects noted, no edema  SKIN: no suspicious lesions or rashes  NEURO: Normal strength and tone, mentation intact and speech normal  PSYCH: mentation appears normal, affect normal/bright        1/29/2025   Mini Cog   Clock Draw Score 2 Normal   3 Item Recall 3 objects recalled   Mini Cog Total Score 5              Signed Electronically by: Ksenia Lyles MD    Answers submitted by the patient for this visit:  Patient Health Questionnaire (Submitted on 1/28/2025)  If you checked off any problems, how difficult have these problems made it for you to do your work, take care of things at home, or get along with other people?: Very difficult  PHQ9 TOTAL SCORE: 10    "

## 2025-01-29 NOTE — PATIENT INSTRUCTIONS
Patient Education   Preventive Care Advice   This is general advice given by our system to help you stay healthy. However, your care team may have specific advice just for you. Please talk to your care team about your preventive care needs.  Nutrition  Eat 5 or more servings of fruits and vegetables each day.  Try wheat bread, brown rice and whole grain pasta (instead of white bread, rice, and pasta).  Get enough calcium and vitamin D. Check the label on foods and aim for 100% of the RDA (recommended daily allowance).  Lifestyle  Exercise at least 150 minutes each week  (30 minutes a day, 5 days a week).  Do muscle strengthening activities 2 days a week. These help control your weight and prevent disease.  No smoking.  Wear sunscreen to prevent skin cancer.  Have a dental exam and cleaning every 6 months.  Yearly exams  See your health care team every year to talk about:  Any changes in your health.  Any medicines your care team has prescribed.  Preventive care, family planning, and ways to prevent chronic diseases.  Shots (vaccines)   HPV shots (up to age 26), if you've never had them before.  Hepatitis B shots (up to age 59), if you've never had them before.  COVID-19 shot: Get this shot when it's due.  Flu shot: Get a flu shot every year.  Tetanus shot: Get a tetanus shot every 10 years.  Pneumococcal, hepatitis A, and RSV shots: Ask your care team if you need these based on your risk.  Shingles shot (for age 50 and up)  General health tests  Diabetes screening:  Starting at age 35, Get screened for diabetes at least every 3 years.  If you are younger than age 35, ask your care team if you should be screened for diabetes.  Cholesterol test: At age 39, start having a cholesterol test every 5 years, or more often if advised.  Bone density scan (DEXA): At age 50, ask your care team if you should have this scan for osteoporosis (brittle bones).  Hepatitis C: Get tested at least once in your life.  STIs (sexually  transmitted infections)  Before age 24: Ask your care team if you should be screened for STIs.  After age 24: Get screened for STIs if you're at risk. You are at risk for STIs (including HIV) if:  You are sexually active with more than one person.  You don't use condoms every time.  You or a partner was diagnosed with a sexually transmitted infection.  If you are at risk for HIV, ask about PrEP medicine to prevent HIV.  Get tested for HIV at least once in your life, whether you are at risk for HIV or not.  Cancer screening tests  Cervical cancer screening: If you have a cervix, begin getting regular cervical cancer screening tests starting at age 21.  Breast cancer scan (mammogram): If you've ever had breasts, begin having regular mammograms starting at age 40. This is a scan to check for breast cancer.  Colon cancer screening: It is important to start screening for colon cancer at age 45.  Have a colonoscopy test every 10 years (or more often if you're at risk) Or, ask your provider about stool tests like a FIT test every year or Cologuard test every 3 years.  To learn more about your testing options, visit:   .  For help making a decision, visit:   https://bit.ly/nt99323.  Prostate cancer screening test: If you have a prostate, ask your care team if a prostate cancer screening test (PSA) at age 55 is right for you.  Lung cancer screening: If you are a current or former smoker ages 50 to 80, ask your care team if ongoing lung cancer screenings are right for you.  For informational purposes only. Not to replace the advice of your health care provider. Copyright   2023 [facility]. All rights reserved. Clinically reviewed by the [facility] Transitions [facility]. Quantitative Medicine 304291 - REV 01/24.  Learning About Activities of Daily Living  What are activities of daily living?     Activities of daily living (ADLs) are the basic self-care tasks you do every day. These include eating, bathing, dressing,  and moving around.  As you age, and if you have health problems, you may find that it's harder to do some of these tasks. If so, your doctor can suggest ideas that may help.  To measure what kind of help you may need, your doctor will ask how well you are able to do ADLs. Let your doctor know if there are any tasks that you are having trouble doing. This is an important first step to getting help. And when you have the help you need, you can stay as independent as possible.  How will a doctor assess your ADLs?  Asking about ADLs is part of a routine health checkup your doctor will likely do as you age. Your health check might be done in a doctor's office, in your home, or at a hospital. The goal is to find out if you are having any problems that could make it hard to care for yourself or that make it unsafe for you to be on your own.  To measure your ADLs, your doctor will ask how hard it is for you to do routine tasks. Your doctor may also want to know if you have changed the way you do a task because of a health problem. Your doctor may watch how you:  Walk back and forth.  Keep your balance while you stand or walk.  Move from sitting to standing or from a bed to a chair.  Button or unbutton a shirt or sweater.  Remove and put on your shoes.  It's common to feel a little worried or anxious if you find you can't do all the things you used to be able to do. Talking with your doctor about ADLs is a way to make sure you're as safe as possible and able to care for yourself as well as you can. You may want to bring a caregiver, friend, or family member to your checkup. They can help you talk to your doctor.  Follow-up care is a key part of your treatment and safety. Be sure to make and go to all appointments, and call your doctor if you are having problems. It's also a good idea to know your test results and keep a list of the medicines you take.  Current as of: October 24, 2023  Content Version: 14.3    2024 [facility]  [employer].   Care instructions adapted under license by your healthcare professional. If you have questions about a medical condition or this instruction, always ask your healthcare professional. [facility] [employer] disclaims any warranty or liability for your use of this information.    Preventing Falls: Care Instructions  Injuries and health problems such as trouble walking or poor eyesight can increase your risk of falling. So can some medicines. But there are things you can do to help prevent falls. You can exercise to get stronger. You can also arrange your home to make it safer.    Talk to your doctor about the medicines you take. Ask if any of them increase the risk of falls and whether they can be changed or stopped.   Try to exercise regularly. It can help improve your strength and balance. This can help lower your risk of falling.         Practice fall safety and prevention.   Wear low-heeled shoes that fit well and give your feet good support. Talk to your doctor if you have foot problems that make this hard.  Carry a cellphone or wear a medical alert device that you can use to call for help.  Use stepladders instead of chairs to reach high objects. Don't climb if you're at risk for falls. Ask for help, if needed.  Wear the correct eyeglasses, if you need them.        Make your home safer.   Remove rugs, cords, clutter, and furniture from walkways.  Keep your house well lit. Use night-lights in hallways and bathrooms.  Install and use sturdy handrails on stairways.  Wear nonskid footwear, even inside. Don't walk barefoot or in socks without shoes.        Be safe outside.   Use handrails, curb cuts, and ramps whenever possible.  Keep your hands free by using a shoulder bag or backpack.  Try to walk in well-lit areas. Watch out for uneven ground, changes in pavement, and debris.  Be careful in the winter. Walk on the grass or gravel when sidewalks are slippery. Use de-icer on steps and walkways.  "Add non-slip devices to shoes.    Put grab bars and nonskid mats in your shower or tub and near the toilet. Try to use a shower chair or bath bench when bathing.   Get into a tub or shower by putting in your weaker leg first. Get out with your strong side first. Have a phone or medical alert device in the bathroom with you.   Where can you learn more?  Go to https://www.[employer].US PREVENTIVE MEDICINE/patiented  Enter G117 in the search box to learn more about \"Preventing Falls: Care Instructions.\"  Current as of: July 31, 2024  Content Version: 14.3    2024 [employer].   Care instructions adapted under license by your healthcare professional. If you have questions about a medical condition or this instruction, always ask your healthcare professional. [employer] disclaims any warranty or liability for your use of this information.    Hearing Loss: Care Instructions  Overview     Hearing loss is a sudden or slow decrease in how well you hear. It can range from slight to profound. Permanent hearing loss can occur with aging. It also can happen when you are exposed long-term to loud noise. Examples include listening to loud music, riding motorcycles, or being around other loud machines.  Hearing loss can affect your work and home life. It can make you feel lonely or depressed. You may feel that you have lost your independence. But hearing aids and other devices can help you hear better and feel connected to others.  Follow-up care is a key part of your treatment and safety. Be sure to make and go to all appointments, and call your doctor if you are having problems. It's also a good idea to know your test results and keep a list of the medicines you take.  How can you care for yourself at home?  Avoid loud noises whenever possible. This helps keep your hearing from getting worse.  Always wear hearing protection around loud noises.  Wear a hearing aid as directed.  A professional can help you pick a hearing aid " "that will work best for you.  You can also get hearing aids over the counter for mild to moderate hearing loss.  Have hearing tests as your doctor suggests. They can show whether your hearing has changed. Your hearing aid may need to be adjusted.  Use other devices as needed. These may include:  Telephone amplifiers and hearing aids that can connect to a television, stereo, radio, or microphone.  Devices that use lights or vibrations. These alert you to the doorbell, a ringing telephone, or a baby monitor.  Television closed-captioning. This shows the words at the bottom of the screen. Most new TVs can do this.  TTY (text telephone). This lets you type messages back and forth on the telephone instead of talking or listening. These devices are also called TDD. When messages are typed on the keyboard, they are sent over the phone line to a receiving TTY. The message is shown on a monitor.  Use text messaging, social media, and email if it is hard for you to communicate by telephone.  Try to learn a listening technique called speechreading. It is not lipreading. You pay attention to people's gestures, expressions, posture, and tone of voice. These clues can help you understand what a person is saying. Face the person you are talking to, and have them face you. Make sure the lighting is good. You need to see the other person's face clearly.  Think about counseling if you need help to adjust to your hearing loss.  When should you call for help?  Watch closely for changes in your health, and be sure to contact your doctor if:    You think your hearing is getting worse.     You have new symptoms, such as dizziness or nausea.   Where can you learn more?  Go to https://www.healthZoomin.com.net/patiented  Enter R798 in the search box to learn more about \"Hearing Loss: Care Instructions.\"  Current as of: September 27, 2023  Content Version: 14.3    2024 [employer].   Care instructions adapted under license by your " healthcare professional. If you have questions about a medical condition or this instruction, always ask your healthcare professional. [employer] disclaims any warranty or liability for your use of this information.    Learning About Stress  What is stress?     Stress is your body's response to a hard situation. Your body can have a physical, emotional, or mental response. Stress is a fact of life for most people, and it affects everyone differently. What causes stress for you may not be stressful for someone else.  A lot of things can cause stress. You may feel stress when you go on a job interview, take a test, or run a race. This kind of short-term stress is normal and even useful. It can help you if you need to work hard or react quickly. For example, stress can help you finish an important job on time.  Long-term stress is caused by ongoing stressful situations or events. Examples of long-term stress include long-term health problems, ongoing problems at work, or conflicts in your family. Long-term stress can harm your health.  How does stress affect your health?  When you are stressed, your body responds as though you are in danger. It makes hormones that speed up your heart, make you breathe faster, and give you a burst of energy. This is called the fight-or-flight stress response. If the stress is over quickly, your body goes back to normal and no harm is done.  But if stress happens too often or lasts too long, it can have bad effects. Long-term stress can make you more likely to get sick, and it can make symptoms of some diseases worse. If you tense up when you are stressed, you may develop neck, shoulder, or low back pain. Stress is linked to high blood pressure and heart disease.  Stress also harms your emotional health. It can make you ahmadi, tense, or depressed. Your relationships may suffer, and you may not do well at work or school.  What can you do to manage stress?  You can try these  things to help manage stress:   Do something active. Exercise or activity can help reduce stress. Walking is a great way to get started. Even everyday activities such as housecleaning or yard work can help.  Try yoga or scott chi. These techniques combine exercise and meditation. You may need some training at first to learn them.  Do something you enjoy. For example, listen to music or go to a movie. Practice your hobby or do volunteer work.  Meditate. This can help you relax, because you are not worrying about what happened before or what may happen in the future.  Do guided imagery. Imagine yourself in any setting that helps you feel calm. You can use online videos, books, or a teacher to guide you.  Do breathing exercises. For example:  From a standing position, bend forward from the waist with your knees slightly bent. Let your arms dangle close to the floor.  Breathe in slowly and deeply as you return to a standing position. Roll up slowly and lift your head last.  Hold your breath for just a few seconds in the standing position.  Breathe out slowly and bend forward from the waist.  Let your feelings out. Talk, laugh, cry, and express anger when you need to. Talking with supportive friends or family, a counselor, or a isaac leader about your feelings is a healthy way to relieve stress. Avoid discussing your feelings with people who make you feel worse.  Write. It may help to write about things that are bothering you. This helps you find out how much stress you feel and what is causing it. When you know this, you can find better ways to cope.  What can you do to prevent stress?  You might try some of these things to help prevent stress:  Manage your time. This helps you find time to do the things you want and need to do.  Get enough sleep. Your body recovers from the stresses of the day while you are sleeping.  Get support. Your family, friends, and community can make a difference in how you experience  "stress.  Limit your news feed. Avoid or limit time on social media or news that may make you feel stressed.  Do something active. Exercise or activity can help reduce stress. Walking is a great way to get started.  Where can you learn more?  Go to https://www.cocone.net/patiented  Enter N032 in the search box to learn more about \"Learning About Stress.\"  Current as of: October 24, 2023  Content Version: 14.3    2024 [employer].   Care instructions adapted under license by your healthcare professional. If you have questions about a medical condition or this instruction, always ask your healthcare professional. [employer] disclaims any warranty or liability for your use of this information.    Bladder Training: Care Instructions  Your Care Instructions     Bladder training is used to treat urge incontinence and stress incontinence. Urge incontinence means that the need to urinate comes on so fast that you can't get to a toilet in time. Stress incontinence means that you leak urine because of pressure on your bladder. For example, it may happen when you laugh, cough, or lift something heavy.  Bladder training can increase how long you can wait before you have to urinate. It can also help your bladder hold more urine. And it can give you better control over the urge to urinate.  It is important to remember that bladder training takes a few weeks to a few months to make a difference. You may not see results right away, but don't give up.  Follow-up care is a key part of your treatment and safety. Be sure to make and go to all appointments, and call your doctor if you are having problems. It's also a good idea to know your test results and keep a list of the medicines you take.  How can you care for yourself at home?  Work with your doctor to come up with a bladder training program that is right for you. You may use one or more of the following methods.  Delayed urination  In the beginning, try " "to keep from urinating for 5 minutes after you first feel the need to go.  While you wait, take deep, slow breaths to relax. Kegel exercises can also help you delay the need to go to the bathroom.  After some practice, when you can easily wait 5 minutes to urinate, try to wait 10 minutes before you urinate.  Slowly increase the waiting period until you are able to control when you have to urinate.  Scheduled urination  Empty your bladder when you first wake up in the morning.  Schedule times throughout the day when you will urinate.  Start by going to the bathroom every hour, even if you don't need to go.  Slowly increase the time between trips to the bathroom.  When you have found a schedule that works well for you, keep doing it.  If you wake up during the night and have to urinate, do it. Apply your schedule to waking hours only.  Kegel exercises  These tighten and strengthen pelvic muscles, which can help you control the flow of urine. (If doing these exercises causes pain, stop doing them and talk with your doctor.) To do Kegel exercises:  Squeeze your muscles as if you were trying not to pass gas. Or squeeze your muscles as if you were stopping the flow of urine. Your belly, legs, and buttocks shouldn't move.  Hold the squeeze for 3 seconds, then relax for 5 to 10 seconds.  Start with 3 seconds, then add 1 second each week until you are able to squeeze for 10 seconds.  Repeat the exercise 10 times a session. Do 3 to 8 sessions a day.  When should you call for help?  Watch closely for changes in your health, and be sure to contact your doctor if:    Your incontinence is getting worse.     You do not get better as expected.   Where can you learn more?  Go to https://www.[employer].net/patiented  Enter V684 in the search box to learn more about \"Bladder Training: Care Instructions.\"  Current as of: April 30, 2024  Content Version: 14.3    2024 [employer].   Care instructions adapted under license by " your healthcare professional. If you have questions about a medical condition or this instruction, always ask your healthcare professional. [email or web address] disclaims any warranty or liability for your use of this information.    Learning About Depression Screening  What is depression screening?  Depression screening is a way to see if you have depression symptoms. It may be done by a doctor or counselor. It's often part of a routine checkup. That's because your mental health is just as important as your physical health.  Depression is a mental health condition that affects how you feel, think, and act. You may:  Have less energy.  Lose interest in your daily activities.  Feel sad and grouchy for a long time.  Depression is very common. It affects people of all ages.  Many things can lead to depression. Some people become depressed after they have a stroke or find out they have a major illness like cancer or heart disease. The death of a loved one or a breakup may lead to depression. It can run in families. Most experts believe that a combination of inherited genes and stressful life events can cause it.  What happens during screening?  You may be asked to fill out a form about your depression symptoms. You and the doctor will discuss your answers. The doctor may ask you more questions to learn more about how you think, act, and feel.  What happens after screening?  If you have symptoms of depression, your doctor will talk to you about your options.  Doctors usually treat depression with medicines or counseling. Often, combining the two works best. Many people don't get help because they think that they'll get over the depression on their own. But people with depression may not get better unless they get treatment.  The cause of depression is not well understood. There may be many factors involved. But if you have depression, it's not your fault.  A serious symptom of depression is thinking about death or  "suicide. If you or someone you care about talks about this or about feeling hopeless, get help right away.  It's important to know that depression can be treated. Medicine, counseling, and self-care may help.  Where can you learn more?  Go to https://www.[employer].net/patiented  Enter T185 in the search box to learn more about \"Learning About Depression Screening.\"  Current as of: July 31, 2024  Content Version: 14.3    2024 [employer].   Care instructions adapted under license by your healthcare professional. If you have questions about a medical condition or this instruction, always ask your healthcare professional. [employer] disclaims any warranty or liability for your use of this information.    Chronic Pain: Care Instructions  Your Care Instructions     Chronic pain is pain that lasts a long time (months or even years) and may or may not have a clear cause. It is different from acute pain, which usually does have a clear cause--like an injury or illness--and gets better over time. Chronic pain:  Lasts over time but may vary from day to day.  Does not go away despite efforts to end it.  May disrupt your sleep and lead to fatigue.  May cause depression or anxiety.  May make your muscles tense, causing more pain.  Can disrupt your work, hobbies, home life, and relationships with friends and family.  Chronic pain is a very real condition. It is not just in your head. Treatment can help and usually includes several methods used together, such as medicines, physical therapy, exercise, and other treatments. Learning how to relax and changing negative thought patterns can also help you cope.  Chronic pain is complex. Taking an active role in your treatment will help you better manage your pain. Tell your doctor if you have trouble dealing with your pain. You may have to try several things before you find what works best for you.  Follow-up care is a key part of your treatment and safety. Be " sure to make and go to all appointments, and call your doctor if you are having problems. It's also a good idea to know your test results and keep a list of the medicines you take.  How can you care for yourself at home?  Pace yourself. Break up large jobs into smaller tasks. Save harder tasks for days when you have less pain, or go back and forth between hard tasks and easier ones. Take rest breaks.  Relax, and reduce stress. Relaxation techniques such as deep breathing or meditation can help.  Keep moving. Gentle, daily exercise can help reduce pain over the long run. Try low- or no-impact exercises such as walking, swimming, and stationary biking. Do stretches to stay flexible.  Try heat, cold packs, and massage.  Get enough sleep. Chronic pain can make you tired and drain your energy. Talk with your doctor if you have trouble sleeping because of pain.  Think positive. Your thoughts can affect your pain level. Do things that you enjoy to distract yourself when you have pain instead of focusing on the pain. See a movie, read a book, listen to music, or spend time with a friend.  If you think you are depressed, talk to your doctor about treatment.  Keep a daily pain diary. Record how your moods, thoughts, sleep patterns, activities, and medicine affect your pain. You may find that your pain is worse during or after certain activities or when you are feeling a certain emotion. Having a record of your pain can help you and your doctor find the best ways to treat your pain.  Take pain medicines exactly as directed.  If the doctor gave you a prescription medicine for pain, take it as prescribed.  If you are not taking a prescription pain medicine, ask your doctor if you can take an over-the-counter medicine.  Reducing constipation caused by pain medicine  Talk to your doctor about a laxative. If a laxative doesn't work, your doctor may suggest a prescription medicine.  Include fruits, vegetables, beans, and whole  "grains in your diet each day. These foods are high in fiber.  If your doctor recommends it, get more exercise. Walking is a good choice. Bit by bit, increase the amount you walk every day. Try for at least 30 minutes on most days of the week.  Schedule time each day for a bowel movement. A daily routine may help. Take your time and do not strain when having a bowel movement.  When should you call for help?   Call your doctor now or seek immediate medical care if:    Your pain gets worse or is out of control.     You feel down or blue, or you do not enjoy things like you once did. You may be depressed, which is common in people with chronic pain. Depression can be treated.     You have vomiting or cramps for more than 2 hours.   Watch closely for changes in your health, and be sure to contact your doctor if:    You cannot sleep because of pain.     You are very worried or anxious about your pain.     You have trouble taking your pain medicine.     You have any concerns about your pain medicine.     You have trouble with bowel movements, such as:  No bowel movement in 3 days.  Blood in the anal area, in your stool, or on the toilet paper.  Diarrhea for more than 24 hours.   Where can you learn more?  Go to https://www.[employer].net/patiented  Enter N004 in the search box to learn more about \"Chronic Pain: Care Instructions.\"  Current as of: July 31, 2024  Content Version: 14.3    2024 [employer].   Care instructions adapted under license by your healthcare professional. If you have questions about a medical condition or this instruction, always ask your healthcare professional. [employer] disclaims any warranty or liability for your use of this information.       "

## 2025-01-30 ENCOUNTER — VIRTUAL VISIT (OUTPATIENT)
Dept: OTOLARYNGOLOGY | Facility: CLINIC | Age: 50
End: 2025-01-30
Payer: MEDICARE

## 2025-01-30 DIAGNOSIS — R49.0 DYSPHONIA: Primary | ICD-10-CM

## 2025-01-30 PROCEDURE — 99207 PR NO CHARGE LOS: CPT | Mod: GN | Performed by: SPEECH-LANGUAGE PATHOLOGIST

## 2025-01-30 NOTE — RESULT ENCOUNTER NOTE
Dear Tito Lyles is out of the office and I am reviewing your results.    Your prostate cancer screening test was normal.  Please call or MyChart my office with any questions or concerns.   Faith Camacho, PAC

## 2025-01-30 NOTE — PROGRESS NOTES
"Tito Pineda is a 49 year old male who is being evaluated via a billable video visit.      Tito has been notified and verbally consented to the following:   This video visit will be conducted between you and your provider.  Patient has opted to conduct today's video visit vs an in-person appointment.   Video visits are billed at different rates depending on your insurance coverage. Please reach out to your insurance provider with any questions.   If during the course of the call the provider feels the appointment is not appropriate, you will not be charged for this service.  Provider has received verbal consent for billable virtual visit from the patient? Yes  Will anyone else be joining your video visit? Nikki Wayne  Clinicina    Call initiated at: 3:02   Type of Visit Platform Used: [employer] Video  Location of provider: Home  Location of patient: Home      [facility]  THERAPY NOTE (CPT 42812)    Patient: Joel Pineda  Date of Service: 1/30/2025  Visit / Treatment number: 2 / 1  Certification Period: ***  Referring physician: {[facility]-ENT:[id number]}  Impressions from most recent evaluation:  \"***\"    SUBJECTIVE:  Since the patient's last session, they report the following:   Overall symptoms are ***  Successes: ***  Hurdles:  ***  Clarification of goals:  ***    OBJECTIVE:  PATIENT REPORTED MEASURES:  Patient Specific Goal Metrics:       No data to display                *see end of note for standardized measures*    THERAPEUTIC ACTIVITIES    Counseling and Education:  ***Asked many questions about the nature of their symptoms, and I answered all of these thoroughly.    ***    ***A regimen for home practice was instructed.  I provided a MyChart message of today's therapeutic activities to facilitate practice.    ASSESSMENT/PLAN  PROGRESS TOWARD LONG TERM GOALS:   {[facility] Treatment Progress:068365}    IMPRESSIONS: ***. Tito ***.    PLAN: I will see Tito in *** weeks, at which point we " "will ***.   For practice goals see AVS.     TOTAL SERVICE TIME: *** minutes  TREATMENT (45568)  NO CHARGE FACILITY FEE (35870)    Chico Acevedo M.M., M.A., CCC-SLP  Speech-Language Pathologist  Certificate of Vocology  [phone]    *this report was created in part through the use of computerized dictation software, and though reviewed following completion, some typographic errors may persist.  If there is confusion regarding any of this notes contents, please contact me for clarification.*    Patient Supplied Answers To Last 2 VHI Questionnaires      1/19/2025    10:33 AM 1/28/2025     8:43 AM   Voice Handicap Index (VHI-10)   My voice makes it difficult for people to hear me 3 2   People have difficulty understanding me in a noisy room 3 2   My voice difficulties restrict my personal and social life.  2 1   I feel left out of conversations because of my voice 2 1   My voice problem causes me to lose income 0 0   I feel as though I have to strain to produce voice 2 2   The clarity of my voice is unpredictable 3 3   My voice problem upsets me 3 2   My voice makes me feel handicapped 1 0   People ask, \"What's wrong with your voice?\" 1 1   VHI-10 20  14        Patient-reported        Patient Supplied Answers To Last 2 CSI Questionnaires      1/19/2025    10:33 AM 1/28/2025     8:43 AM   Cough Severity Index (CSI)   My cough is worse when I lie down 0 0   My coughing problem causes me to restrict my personal and social life 1 0   I tend to avoid places because of my cough problem 1 0   I feel embarrassed because of my coughing problem 2 1   People ask, ''What's wrong?'' because I cough a lot 0 0   I run out of air when I cough 2 0   My coughing problem affects my voice 2 1   My coughing problem limits my physical activity 1 0   My coughing problem upsets me 2 0   People ask me if I am sick because I cough a lot 1 0   CSI Score 12  2        Patient-reported        Patient Supplied Answers To Last 2 EAT " Questionnaires      1/28/2025     8:43 AM   Eating Assessment Tool (EAT-10)   My swallowing problem has caused me to lose weight 0   My swallowing problem interferes with my ability to go out for meals 0   Swallowing liquids takes extra effort 0   Swallowing solids takes extra effort 2   Swallowing pills takes extra effort 2   Swallowing is painful 1   The pleasure of eating is affected by my swallowing 0   When I swallow food sticks in my throat 2   I cough when I eat 0   Swallowing is stressful 0   EAT-10 7        Patient-reported        Patient Supplied Answers to Dyspnea Index Questionnaire:      1/19/2025    10:33 AM   Dyspnea Index   1. I have trouble getting air in. 2   2. I feel tightness in my throat when I am having lindsey breathing problem. 2   3. It takes more effort to breathe than it used to. 2   4. Change in weather affect my breathing problem. 0   5. My breathing gets worse with stress. 0   6. I make sound/noise breathing in 0   7. I have to strain to breathe. 1   8. My shortness of breath gets worse with exercise or physical activity 2   9. My breathing problem makes me feel stressed. 2   10. My breathing problem casuses me to restrict my personal and social life. 1   Dyspnea Index Total Score 12        Patient-reported

## 2025-01-30 NOTE — LETTER
1/30/2025       RE: Joel Pineda  [address] [place] [address]     Dear Colleague,    Thank you for referring your patient, Joel Pineda, to the [facility] [place] at [facility]. Please see a copy of my visit note below.    Tito Pineda is a 49 year old male who is being evaluated via a billable video visit.      Tito has been notified and verbally consented to the following:   This video visit will be conducted between you and your provider.  Patient has opted to conduct today's video visit vs an in-person appointment.   Video visits are billed at different rates depending on your insurance coverage. Please reach out to your insurance provider with any questions.   If during the course of the call the provider feels the appointment is not appropriate, you will not be charged for this service.  Provider has received verbal consent for billable virtual visit from the patient? Yes  Will anyone else be joining your video visit? Nikki Wayne  Clinicina    Call initiated at: 3:02   Type of Visit Platform Used: [employer] Video  Location of provider: Home  Location of patient: Home      Tito was scheduled to see me to initiate voice focused speech therapy.  After reviewing the notes from Dr. Ptaino (referring provider) it became clear that the intention was for him to complete a course of LSVT big and loud prior to initiating with our clinic.  He had questions about evaluation as well as the plan for treatment and these were answered to the best of my abilities.  Patient will be seen in April to address any residual voice concerns.  Should symptoms resolve between now and that point he is welcome to cancel the sessions.  No skilled services were provided as a part of today's visit and there is correspondingly no charge.    Thank you for the opportunity to participate in the care of this pleasant  patient.    -Tono          Again, thank you for allowing me to participate in the care of your patient.      Sincerely,    Chico Acevedo, SLP

## 2025-02-03 NOTE — PROGRESS NOTES
Medication Therapy Management (MTM) Encounter    ASSESSMENT:                            Medication Adherence/Access: No issues reported    Diabetes  Patient is meeting A1c goal of < 7%. Titrating Mounjaro dose slowly to aid in tolerability.     Mood/Insomnia: Stable. Follows closely with psychiatry.    Migraine:  Buproprion can cause headaches >25% incidence and metoclopramide can increase bupropion levels potentially increasing the risk of side effects. Oxycodone (using > 10 days/month), acetaminophen  (using > 15 days/month and relafen (using >15 days/month) could be contributing to rebound headaches. May benefit from reducing usage of acetaminophen, relafen and oxycodone. Patient is using the least amount of acetaminophen so may be desirable to start with decreasing acetaminophen usage.     Pain:  Stable    Asthma   Would benefit from updated ACT.    Nausea:  Stable    Hyperlipidemia   Stable. Triglyceridedes >150 despite maximum dose of rosuvastatin, Lovaza and dose of fenofibrate limited due to negative effect on kidneys.    GERD  Stable.    Will review remaining conditions/medications at next visit.    PLAN:                            Recommend limiting use of acetaminophen to less than 15 days per month.  Asthma Control Test sent via [employer]    Follow-up: 2 weeks with endocrinology MTM and 3 months with primary care MTM    SUBJECTIVE/OBJECTIVE:                          Joel Pineda is a 49 year old male called for a follow up visit. He was referred to me from Ksenia Lyles. Follow up from 11/5/2024.    Reason for visit: Medication Review (rebound headaches, Mounjaro)  Patient follows with endocrinology MTM    Allergies/ADRs: Reviewed in chart  Past Medical History: Reviewed in chart  Tobacco: He reports that he has never smoked. He has never been exposed to tobacco smoke. He has never used smokeless tobacco.  Alcohol: none  Caffeine: 24 ounces of coffee/day  Activity: None    Medication  "Adherence/Access: no issues reported  Patient uses pill box(es).  Patient works with [employer] (delivered to his house)   Asked pharmacy to switch to non child proof caps.    Diabetes   Patient diagnosed with type 2 diabetes   Current Medications:  Mounjaro 2.5mg weekly on Fridays- has have been able to tolerate better compared to Ozempic therapy. Of note, patient does have a history of gastroparesis. Nausea seems to be getting better with time on the 2.5mg of Mounjaro.Has been taking metoclopramide every other day.  Medication History:  - Ozempic: rash occurring on face with higher doses, replaced by Mounjaro    Lab Results   Component Value Date    A1C 5.8 01/15/2025    A1C 5.5 06/24/2024    A1C 5.7 02/15/2023     Estimated body mass index is 35.54 kg/m  as calculated from the following:    Height as of 11/7/24: 6' 4\" (1.93 m).    Weight as of 11/7/24: 292 lb (132.5 kg).    Wt Readings from Last 3 Encounters:   11/07/24 292 lb (132.5 kg)   10/31/24 293 lb (132.9 kg)   09/25/24 283 lb (128.4 kg)            Eye exam is up to date  Foot exam: due     Mood/Insomnia:  Duloxetine 120mg daily  Bupropion XL 300mg daily  Risperidone 0.5mg twice daily as needed-taking five times a week. Higher doses of risperdone are overly sedating  Clonazepam 1mg at bedtime for RBD-has been using 0.5mg during the day about every other day. Risperdone alone has not been effective.  Lunesta 3mg at bedtime  Melatonin 10mg nightly     When bupropion was increased to 450mg and was having extreme forgetfulness (I.e. losing car n parking lot) so dose was decreased back to 300mg.   Sleep has been good. CPAP is a little bulky.  Mood is so-so. Meeting with therapist weekly.  Follows  with Dr. CHENEY at [facility].     Migraine:   Preventive medications  Botox every 3 months  Depakote 125mg daily   Magnesium Glycinate 400mg daily  Riboflavin 400mg daily     Acute medications  Ubrelvy 100mg at onset of headache    Wondering what medications " could be causing rebound headaches or headaches.   Ubrelvy is effective.  If he uses higher dose of depakote, he has increased appetite and weight gain.  Headaches have been better as of yesterday. Was thinking this was maybe due to starting Mounjaro.  Neurologist is trying to get approval for Botox every 10 weeks. Riboflavin was increased from 200 to 400mg daily about a month ago.  Was recommended by neurology to not take more than 11 tablets of acetaminophen per month.   Follows with [facility].    Pain:  Oxycodone 5mg can take up to two times daily as needed for pain-has been taking 2.5mg up to four times a day  Pregabalin 150mg three times daily-only taking 150mg in the morning and 150mg at night. Gets more brain fog and fatigue with higher doses.  Lidocaine 5% ointment twice daily as needed-neck and shoulders  Methocarbamol 500-1000mg four time daily-has been taking 1000mg twice daily to three times dailyl  Nabumetone 500-1000mg twice daily as needed -500mg in the morning and one at night or sometimes will take 1000mg in the morning and 500mg at night.  Acetaminophen 500mg in the morning  Lidocaine 4% patch uses as needed-sparingly    Has been working with Pain Management (provider, therapist)  in [place] and feel this has been helpful. Pain  has been manageable.    Had a bilateral 4-lumbar 5 hemilaminectomies, medial facetectomies and foraminotomies with lumbar microdisectomy on the left and lumbar 5-sacral 1 foramiooomies on 9/25/2024.    Asthma   Breo 100-25mcg 1 puff daily   Albuterol 2 puffs every 6 hours as needed-using a couple of times a day with sinus infection and cough  Montelukast 10mg daily    Patient rinses their mouth after using steroid inhaler.   Patient reports no current medication side effects.      Triggers include: smoke, upper respiratory infections, and strong odors and fumes.  Patient reports the following symptoms: increased need of albuterol.    Follows with Dr. [facility]  [facility]. 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     Nausea:  Metoclopramide 5mg four times daily as needed.    Has been taking 1 tablet about every other day. Feels like nausea is improving the longer patient has been taking Mounjaro.    Hyperlipidemia   rosuvastatin 40mg daily  Fish Oil 2g twice daily  Fenofibrate 48mg once daily-kidney function decreased with higher doses of fenofibrate  Patient reports no significant myalgias or other side effects.  The 10-year ASCVD risk score (Katiana TORRES, et al., 2019) is: 8.8%    Values used to calculate the score:      Age: 48 years      Sex: Male      Is Non- : No      Diabetic: Yes      Tobacco smoker: No      Systolic Blood Pressure: 143 mmHg      Is BP treated: Yes      HDL Cholesterol: 33 mg/dL      Total Cholesterol: 141 mg/dL       GERD      Omeprazole 20 mg once daily  Famotidine 20 mg once daily   Patient feels that current regimen is effective.         Today's Vitals: There were no vitals taken for this visit.  ----------------    I spent 31 minutes with this patient today. All changes were made via collaborative practice agreement with Ksenia Lyles. A copy of the visit note was provided to the patient's provider(s).    A summary of these recommendations was sent via [employer].    Radha Mcdonald, Pharm.D, [facility]  Medication Therapy Management Pharmacist      Telemedicine Visit Details  The patient's medications can be safely assessed via a telemedicine encounter.  Type of service:  Telephone visit  Originating Location (pt. Location): Home    Distant Location (provider location):  On-site  Start Time: 1:58PM  End Time: 2:29PM     Medication Therapy Recommendations  No medication therapy recommendations to display

## 2025-02-04 ENCOUNTER — VIRTUAL VISIT (OUTPATIENT)
Dept: PHARMACY | Facility: CLINIC | Age: 50
End: 2025-02-04
Payer: COMMERCIAL

## 2025-02-04 ENCOUNTER — DOCUMENTATION ONLY (OUTPATIENT)
Dept: OTOLARYNGOLOGY | Facility: CLINIC | Age: 50
End: 2025-02-04
Payer: MEDICARE

## 2025-02-04 DIAGNOSIS — E78.5 HYPERLIPIDEMIA LDL GOAL <70: ICD-10-CM

## 2025-02-04 DIAGNOSIS — J45.30 MILD PERSISTENT ASTHMA WITHOUT COMPLICATION: ICD-10-CM

## 2025-02-04 DIAGNOSIS — G43.819 OTHER MIGRAINE WITHOUT STATUS MIGRAINOSUS, INTRACTABLE: ICD-10-CM

## 2025-02-04 DIAGNOSIS — E11.9 TYPE 2 DIABETES MELLITUS WITHOUT COMPLICATION, WITHOUT LONG-TERM CURRENT USE OF INSULIN (H): Primary | ICD-10-CM

## 2025-02-04 DIAGNOSIS — R11.0 NAUSEA: ICD-10-CM

## 2025-02-04 DIAGNOSIS — G47.00 INSOMNIA, UNSPECIFIED TYPE: ICD-10-CM

## 2025-02-04 DIAGNOSIS — F41.8 DEPRESSION WITH ANXIETY: ICD-10-CM

## 2025-02-04 DIAGNOSIS — K21.9 GASTROESOPHAGEAL REFLUX DISEASE WITHOUT ESOPHAGITIS: ICD-10-CM

## 2025-02-04 DIAGNOSIS — G89.4 CHRONIC PAIN SYNDROME: ICD-10-CM

## 2025-02-04 PROCEDURE — 99207 PR NO CHARGE LOS: CPT | Mod: 93 | Performed by: PHARMACIST

## 2025-02-04 RX ORDER — RIBOFLAVIN (VITAMIN B2) 400 MG
400 TABLET ORAL DAILY
COMMUNITY

## 2025-02-04 NOTE — Clinical Note
Traci, sounds like Tito is starting to tolerate the Mounjaro better. I didn't get through all of his meds today but will plan on addressing with him at follow up. Daja

## 2025-02-04 NOTE — PROGRESS NOTES
PT plan of care certification for period of  1/31/25 through 4-4-25 [facility] Fax number [phone]. [facility] faxed.

## 2025-02-04 NOTE — PATIENT INSTRUCTIONS
"Recommendations from today's MTM visit:                                                         Recommend limiting use of acetaminophen to less than 15 days per month.  Asthma Control Test sent via [facility]    Follow-up: 3 months    It was great speaking with you today.  I value your experience and would be very thankful for your time in providing feedback in our clinic survey. In the next few days, you may receive an email or text message from [employer] with a link to a survey related to your  clinical pharmacist.\"     To schedule another MTM appointment, please call the clinic directly or you may call the MTM scheduling line at [phone] or toll-free at [phone].     My Clinical Pharmacist's contact information:                                                      Please feel free to contact me with any questions or concerns you have.      Radha Mcdonald, Pharm.D, [facility]  Medication Therapy Management Pharmacist     "

## 2025-02-05 ENCOUNTER — TRANSFERRED RECORDS (OUTPATIENT)
Dept: HEALTH INFORMATION MANAGEMENT | Facility: CLINIC | Age: 50
End: 2025-02-05
Payer: MEDICARE

## 2025-02-11 NOTE — PROGRESS NOTES
Outpatient Speech Language Therapy Evaluation  PLAN OF TREATMENT FOR OUTPATIENT REHABILITATION  (COMPLETE FOR INITIAL CLAIMS ONLY)  Patient's Last Name, First Name, M.I.  YOB: 1975  Joel Pineda                        Provider's Name  Domi Rehman, SLP Medical Record No.  [id number]                               Onset Date:  1/24/25   Start of Care Date: 1/24/25     Type: Speech Language Therapy Medical Diagnosis: No primary diagnosis found.                        Therapy Diagnosis:  No primary diagnosis found.   Visits from SOC:  1   _________________________________________________________________________________  Plan of Treatment:   Speech therapy    DURATION/FREQUENCY OF TREATMENT  Six weekly, one-hour sessions, with two monthly one-hour follow-up sessions    PROGNOSIS  Good prognosis for voice improvement with speech therapy and regular practice of therapeutic activities.    BARRIERS TO LEARNING/TEACHING AND LEARNING NEEDS  None/Unremarkable    GOALS:  Patient goal:   To improve and maintain a healthy voice quality  To understand the problem and fix it as much as possible  To breathe normally and comfortably in all situations    Short-term goal(s): Within the first 4 sessions, Mr. Pineda:  will be able to independently list key factors in maintenance of good vocal hygiene with 80% accuracy, and report on their use outside the therapy room.  will utilize silent inhalation with good low-respiratory engagement 75% of the time during therapy tasks with minimal clinician support  will demonstrate semi-occluded vocal tract (SOVT) exercises with at least 80% accuracy with no clinician support    Long-term goal(s): In 6 months, Mr. Pineda will:  Report resolution of dysphonia, and use of optimal voice quality to meet personal, social, and professional needs, 90% of the time during a typical week of vocal  activities    _________________________________________________________________________________    I CERTIFY THE NEED FOR THESE SERVICES FURNISHED UNDER        THIS PLAN OF TREATMENT AND WHILE UNDER MY CARE     (Physician attestation of this document indicates review and certification of the therapy plan).     Certification date from: 1/24/25  Certification date to: 4/24/25    Referring Provider: Sukumar

## 2025-02-12 ENCOUNTER — VIRTUAL VISIT (OUTPATIENT)
Facility: CLINIC | Age: 50
End: 2025-02-12
Payer: MEDICARE

## 2025-02-12 DIAGNOSIS — F33.1 MAJOR DEPRESSIVE DISORDER, RECURRENT EPISODE, MODERATE (H): Primary | ICD-10-CM

## 2025-02-12 DIAGNOSIS — F41.1 GENERALIZED ANXIETY DISORDER: ICD-10-CM

## 2025-02-12 ASSESSMENT — COLUMBIA-SUICIDE SEVERITY RATING SCALE - C-SSRS
REASONS FOR IDEATION SINCE LAST CONTACT: COMPLETELY TO END OR STOP THE PAIN (YOU COULDN'T GO ON LIVING WITH THE PAIN OR HOW YOU WERE FEELING)
2. HAVE YOU ACTUALLY HAD ANY THOUGHTS OF KILLING YOURSELF?: NO
1. SINCE LAST CONTACT, HAVE YOU WISHED YOU WERE DEAD OR WISHED YOU COULD GO TO SLEEP AND NOT WAKE UP?: YES
SUICIDE, SINCE LAST CONTACT: NO
6. HAVE YOU EVER DONE ANYTHING, STARTED TO DO ANYTHING, OR PREPARED TO DO ANYTHING TO END YOUR LIFE?: NO
1. SINCE LAST CONTACT, HAVE YOU WISHED YOU WERE DEAD OR WISHED YOU COULD GO TO SLEEP AND NOT WAKE UP?: PASSIVE THOUGHTS
ATTEMPT SINCE LAST CONTACT: NO
TOTAL  NUMBER OF ABORTED OR SELF INTERRUPTED ATTEMPTS SINCE LAST CONTACT: NO
TOTAL  NUMBER OF INTERRUPTED ATTEMPTS SINCE LAST CONTACT: NO

## 2025-02-12 NOTE — PROGRESS NOTES
[facility]                                     Progress Note    Patient Name: Joel Pineda  Date: February 12, 2025             Service Type: Individual      Session Start Time: 2:01 PM Session End Time: 2:58 PM      Session Length: 57 minutes    Session #: 13    Attendees: Client attended alone    Service Modality:  Video Visit:      Provider verified identity through the following two step process.  Patient provided:  Patient photo and Patient is known previously to provider    Telemedicine Visit: The patient's condition can be safely assessed and treated via synchronous audio and visual telemedicine encounter.      Reason for Telemedicine Visit: Patient has requested telehealth visit    Originating Site (Patient Location): Patient's home    Distant Site (Provider Location): Provider Remote Setting- Home Office    Consent:  The patient/guardian has verbally consented to: the potential risks and benefits of telemedicine (video visit) versus in person care; bill my insurance or make self-payment for services provided; and responsibility for payment of non-covered services.     Patient would like the video invitation sent by:  Text to cell phone: [phone]    Mode of Communication:  Video Conference via [facility]    Distant Location (Provider):  Off-site    As the provider I attest to compliance with applicable laws and regulations related to telemedicine.    DATA  Interactive Complexity: Yes, visit entailed Interactive Complexity evidenced by:  -Caregiver emotions/behavior that interfere with implementation of the treatment plan Patient's presenting concerns require more intensive intervention than could be completed within the usual service. Provider used clinical judgment in determining the necessary length for the session to benefit the patient's needs.     Crisis: No        Progress Since Last Session (Related to Symptoms / Goals / Homework):   Symptoms: Worsening reports increased  "distress since last visit - has been 'relying more' on medications to manage sensation of loss of control and fear, ongoing passive SI.    Homework: Achieved / completed to satisfaction - using DBT skills for emotion regulation.      Episode of Care Goals: Satisfactory progress - CONTEMPLATION (Considering change and yet undecided); Intervened by assessing the negative and positive thinking (ambivalence) about behavior change     Current / Ongoing Stressors and Concerns:   Tito reports he has been going to [employer] for '[facility] and Loud Therapy' and treatment for dysphonia.  quit, wasn't paying bills for the [employer], manager of the property management company temporarily taking over. Step-father was frustrating on Super Bowl - recognizing they don't communicate in a way that they each understand or miss each other's humor. Report some 'politick-ing' on the [employer] board. SLP suggested Parkinson's be re-visited as a diagnosis. Using ice roller to regulate. \"Anxiety has moved into fear,\" this is manifesting as migraines. Recognizing watching or reading news is not helpful. Explored when he first notices anxiety is elevating. Reviewed safety plan and re-assessed C-SSRS.      Treatment Objective(s) Addressed in This Session:   use thought-stopping strategy daily to reduce intrusive thoughts  state understanding of stressors and relationship to physical symptoms  Feel less tired and more energy during the day   Identify negative self-talk and behaviors: challenge core beliefs, myths, and actions  Decrease thoughts that you'd be better off dead or of suicide / self-harm  reduce their emotional vulnerability by 40% during the Emotion Regulation Module (6-8 weeks)      Intervention:      CBT: cognitive challenging, restructuring, reframing Behavioral activation techniques, grounding strategies  PCT: supportive autonomy, unconditional positive regard, empathic reflection, active listening  DBT: check the facts, " self-soothing with the 5 senses  Reviewed safety plan and re-assessed C-SSRS.    Assessments completed prior to visit:  The following assessments were completed by patient for this visit:  The following assessments were completed by patient for this visit:  PHQ9:       10/29/2024     4:30 PM 12/3/2024    12:12 PM 12/10/2024     2:20 PM 12/17/2024     2:51 PM 1/1/2025     3:48 PM 1/14/2025     9:37 AM 1/28/2025     8:32 AM   PHQ-9 SCORE   PHQ-9 Total Score MyChart 14 (Moderate depression) 10 (Moderate depression) 10 (Moderate depression) 15 (Moderately severe depression) 8 (Mild depression) 10 (Moderate depression) 10 (Moderate depression)   PHQ-9 Total Score 14  10  10  15  8  10  10        Patient-reported     GAD7:       4/22/2024    10:44 AM 7/18/2024     9:53 AM 8/1/2024     9:28 AM 10/20/2024     2:25 PM 12/3/2024    12:14 PM 12/17/2024     2:51 PM 1/1/2025     3:50 PM   YUDI-7 SCORE   Total Score 8 (mild anxiety) 11 (moderate anxiety) 7 (mild anxiety) 7 (mild anxiety) 9 (mild anxiety) 12 (moderate anxiety) 8 (mild anxiety)   Total Score 8 11 7 7 9  12  8        Patient-reported         ASSESSMENT: Current Emotional / Mental Status (status of significant symptoms):   Risk status (Self / Other harm or suicidal ideation)   Patient reports the following current fears or concerns for personal safety: Ongoing passive thoughts of suicide with absence of plan or intent and agreed to follow his previously developed [place] Safety Plan.   Patient denies current or recent suicidal ideation or behaviors.   Patient denies current or recent homicidal ideation or behaviors.   Patient denies current or recent self injurious behavior or ideation.   Patient denies other safety concerns.   Patient reports there has been no change in risk factors since their last session.     Patient reports there has been no change in protective factors since their last session.     A safety and risk management plan has been developed  including: Patient consented to co-developed safety plan on 10/21/2024.  Safety and risk management plan was reviewed.   Patient agreed to use safety plan should any safety concerns arise.  A copy was made available to the patient.     Appearance:   Appropriate    Eye Contact:   Fair    Psychomotor Behavior: Normal    Attitude:   Cooperative    Orientation:   All   Speech    Rate / Production: Normal     Volume:  Normal    Mood:    Anxious  Depressed  Sad    Affect:    Subdued  Worrisome    Thought Content:  Clear    Thought Form:  Coherent  Logical    Insight:    Good      Medication Review:   No changes to current psychiatric medication(s) Reports psychiatrist encouraged him to take Klonopin more regularly.       Medication Compliance:   Yes - may not take some PRNs as often as he could     Changes in Health Issues:   None reported     Chemical Use Review:   Substance Use: Chemical use reviewed, no active concerns identified      Tobacco Use: No current tobacco use.      Diagnosis:  296.32 (F33.1) Major Depressive Disorder, Recurrent Episode, Moderate _.  300.02 (F41.1) Generalized Anxiety Disorder.    Collateral Reports Completed:   Not Applicable    PLAN: (Patient Tasks / Therapist Tasks / Other)    Patient to continue to use non-judgmental stance and cope ahead skills.     Patient and provider will continue practicing how to reframe automatic thinking from negative situations.     Patient will practice radical self-compassion techniques to decrease shame.     Shirley Bartlett PsyD                                                          ______________________________________________________________________    Individual Treatment Plan    Patient's Name: Joel Pineda  YOB: 1975    Date of Creation: October 30, 2024   Date Treatment Plan Last Reviewed/Revised: January 29, 2025      DSM5 Diagnoses: 296.32 (F33.1) Major Depressive Disorder, Recurrent Episode, Moderate _ or 300.02 (F41.1)  Generalized Anxiety Disorder  Psychosocial / Contextual Factors: recent surgery and resulting acute pain, chronic pain, chronic mental health concerns, mobility limitations  PROMIS (reviewed every 90 days):   PROMIS-10 Scores        11/6/2024    12:19 PM 1/15/2025    12:04 PM 2/11/2025     2:54 PM   PROMIS-10 Total Score w/o Sub Scores   PROMIS TOTAL - SUBSCORES 18  21  21        Patient-reported        Referral / Collaboration:  Referral to another professional/service is not indicated at this time..    Anticipated number of session for this episode of care: 9-12 sessions  Anticipation frequency of session: Weekly  Anticipated Duration of each session: 53 or more minutes  Treatment plan will be reviewed in 90 days or when goals have been changed.     MeasurableTreatment Goal(s) related to diagnosis / functional impairment(s)  Goal 1: Patient will decrease anxiety by 75% as evidenced by YUDI-7    I will know I've met my goal when I am at peace with where I'm at.       Objective #A (Patient Action)                          Patient will identify the situations / thoughts that contribute to feeling anxious.  Status: Continued - Date(s):1/29/2025       Intervention(s)  Therapist will process anxiety-proving emotions.     Objective #B  Patient will use at least 3 coping skills for anxiety management in the next 4 weeks.  Status: Continued - Date(s):1/29/2025        Intervention(s)  Therapist will assign homework : coping skills practice to decrease anxiety .     Objective #C  Patient will use cognitive strategies identified in therapy to challenge anxious thoughts.  Status: Continued - Date(s):1/29/2025        Intervention(s)  Therapist will  teach the patient ways to reframe negative core beliefs that contribute to anxiety.     Goal 2 Patient will report pain levels are consistently rated at 2/10 per patient report.   I will know I've met my goal when my pain is less disruptive.      Objective #A (Patient  Action)    Patient will identify 5 strategies for managing pain.  Status: Continued - Date(s):1/29/2025     Intervention(s)  Therapist will teach  strategies to manage pain and assign homework to use 3 strategies daily .    Objective #B  Patient will  engage in physical activity and PT exercises 2-3 times daily .  Status: Continued - Date(s):1/29/2025      Intervention(s)  Therapist will assign homework to engage in at minimum one PT or physical activity daily .    Objective #C  Patient will Identify negative self-talk and behaviors: challenge core beliefs, myths, and actions.  Status: Continued - Date(s):1/29/2025      Intervention(s)  Therapist will teach non-judgmental stance and help patient reframe negative self-talk  .      Goal 3: Patient will report reduction in depressive symptoms as evidenced by PHQ-9 score reduction of 75% or greater.     I will know I've met my goal when I feel like I have purpose in life.      Objective #A (Patient Action)    Status: Continued - Date(s):1/29/2025      Patient will Decrease frequency and intensity of feeling down, depressed, hopeless.    Intervention(s)  Therapist will teach emotional recognition/identification. Therapist will reinforce use of emotion regulation skills .    Objective #B  Patient will use healthy communication when describing his emotions or when setting boundaries with others.     Status: Continued - Date(s):1/29/2025      Intervention(s)  Therapist will teach Therapist will teach emotional regulation skills and interpersonal effectiveness skills to improve quality of communication.     Objective #C  Patient will track and record at least 3 pleasant exchanges with family members such as mother, father .  Status: Continued - Date(s):1/29/2025     Intervention(s)  Therapist will teach about healthy boundaries. Therapist will encourage patient to focus on positive interactions with family and use cope ahead to increase likelihood of pleasant experience as  an outcome .    Patient has reviewed and agreed to the above plan.      Shirley Bartlett PsyD LP  January 29, 2025    Answers submitted by the patient for this visit:  Patient Health Questionnaire (Submitted on 12/3/2024)  If you checked off any problems, how difficult have these problems made it for you to do your work, take care of things at home, or get along with other people?: Very difficult  PHQ9 TOTAL SCORE: 10  Patient Health Questionnaire (G7) (Submitted on 12/3/2024)  YUDI 7 TOTAL SCORE: 9    Answers submitted by the patient for this visit:  Patient Health Questionnaire (Submitted on 12/17/2024)  If you checked off any problems, how difficult have these problems made it for you to do your work, take care of things at home, or get along with other people?: Very difficult  PHQ9 TOTAL SCORE: 15  Patient Health Questionnaire (G7) (Submitted on 12/17/2024)  YUDI 7 TOTAL SCORE: 12    Answers submitted by the patient for this visit:  Patient Health Questionnaire (Submitted on 1/14/2025)  If you checked off any problems, how difficult have these problems made it for you to do your work, take care of things at home, or get along with other people?: Somewhat difficult  PHQ9 TOTAL SCORE: 10    [facility] Safety Plan      Creation Date: 10/21/24       Step 1: Warning signs:    Warning Signs    Start thinking about death more      Step 2: Internal coping strategies - Things I can do to take my mind off my problems without contacting another person:    Strategies    Petting cats    Watching music videos    talk to family member    text friend Tono    hot bubble bath      Step 3: People and social settings that provide distraction:    Name Contact Information    Tono - friend [phone]    Mother [phone]         Step 4: People whom I can ask for help during a crisis:    Name Contact Information    Keyana - sister [phone]      Step 5: Professionals or agencies I can contact during a crisis:    Clinician/Agency  Name Phone Emergency Contact    Shirley Bartlett [phone]    ANU CHENEY        Local Emergency Department Emergency Department Address Emergency Department Phone    [facility]       Suicide Prevention Lifeline Phone: Call or Text 386  Crisis Text Line: Text HOME to 454507     Step 6: Making the environment safer (plan for lethal means safety):   Previous medications - dispose of unused or older medications     Optional: What is most important to me and worth living for?:   Niece  Mother  Sister  Cats     [place] Safety Plan. Shanita Lazcano and Ariel Lorenzo. Used with permission of the authors.           no

## 2025-02-17 ENCOUNTER — VIRTUAL VISIT (OUTPATIENT)
Dept: PHARMACY | Facility: CLINIC | Age: 50
End: 2025-02-17
Attending: INTERNAL MEDICINE
Payer: MEDICARE

## 2025-02-17 DIAGNOSIS — E11.9 TYPE 2 DIABETES MELLITUS WITHOUT COMPLICATION, WITHOUT LONG-TERM CURRENT USE OF INSULIN (H): Primary | ICD-10-CM

## 2025-02-17 RX ORDER — TIRZEPATIDE 2.5 MG/.5ML
2.5 INJECTION, SOLUTION SUBCUTANEOUS
Qty: 2 ML | Refills: 1 | Status: SHIPPED | OUTPATIENT
Start: 2025-02-17

## 2025-02-17 NOTE — Clinical Note
Bennett Farnsworth,   Patient was recently found to have dysphagia, any additional thoughts of medications that could be causing?   Thanks,  Traci

## 2025-02-17 NOTE — PROGRESS NOTES
Medication Therapy Management (MTM) Encounter    ASSESSMENT:                            Medication Adherence/Access: Potential medications that could be contributing to dysphagia include nabumetone due to direct irritation of GI tract. Medications that increase risk of dry mouth and thus dysphagia which could include bupropion, clonazepam, Benadryl, divalproex, duloxetine, Lunesta, fexofenadine, methocarbamol, metoclopramide, montelukast, oxycodone, pregabalin, and Ubrelvy. Lastly, risperidone can increase risk of extrapyramidal symptoms (EPS) which could present as dysphagia. It is likely that patient's current dysphagia could be condition related rather than medication but will notify patient of the potential risks with current medications.     Diabetes: Patient continues to tolerate Mounjaro therapy with some side effects given recent increased frequency of metoclopramide use thus would recommend patient continue on current Mounjaro dose for at least an additional month to help with overall tolerance before considering dose increase given patient's history of gastroparesis.     Due to time constraints, I was only able to assess the above with the patient today. Patient recently met MTM pharmacist Daja Mcdonald to review medications and will continue to follow-up on other disease states and medication review with Daja at upcoming visit.     PLAN:                            Continue on Mounjaro 2.5mg weekly for an additional month, refill sent to pharmacy     Medications that could be related to dysphagia:  NSAIDS (nabumetone): can cause direct irritation to GI tract thus increasing risk of dysphagia or troubles swallowing     Medications that can increase risk of dry mouth thus troubles with swallowing: bupropion, clonazepam, Benadryl, divalproex, duloxetine, Lunesta, fexofenadine, methocarbamol, metoclopramide, montelukast, oxycodone, pregabalin, Ubrelvy    risperidone: is associated with extrapyramidal symptoms  (EPS), also known as drug-induced movement disorders which can include acute dystonia, drug-induced parkinsonism, akathisia, and tardive dyskinesia. EPS presenting as dysphagia, esophageal dysmotility, or aspiration have also been reported with risperidone and other related medications, which may not be recognized as EPS     Follow-up: 3/17/25 at 1PM    Endocrine Team & Next Follow-Up:  3/17/25 with Traci  7/7/25 with Dr. Cuenca    SUBJECTIVE/OBJECTIVE:                          Tito Pineda is a 49 year old male called for a follow-up visit.       Reason for visit: Mounjaro follow up.    Allergies/ADRs: Reviewed in chart  Past Medical History: Reviewed in chart  Tobacco: He reports that he has never smoked. He has never been exposed to tobacco smoke. He has never used smokeless tobacco.  Alcohol: Reviewed in chart    Medication Adherence/Access: no issues reported, of note, patient notes that he was recently found that he has dysphagia and working with ENT for further workup, patient wondering if any of his medications could be related to these symptoms.     Diabetes   Patient diagnosed with type 2 diabetes   Current Medications:  Mounjaro 2.5mg weekly on Thursdays- patient notes that he has been having some migraines and nausea symptoms on and off usually occurring around meal time. Notes that he has been working with his neurology team to adjust medications to try to help with migraines and notes that specifically their divalproex frequency has increased. Of note, patient does have a history of gastroparesis. Has been using metoclopramide to help with symptoms and notes that he has been using metoclopramide 5-10mg daily for the last couple days.   Medication History:  - Ozempic: rash occurring on face with higher doses, replaced by Mounjaro     Today's Vitals: There were no vitals taken for this visit.  ----------------    I spent 20 minutes with this patient today. All changes were made via collaborative practice  agreement with Abbie Cuenca.     A summary of these recommendations was sent via [employer].    Traci Méndez), PharmD  Endocrine & Diabetes Medication Therapy Management Pharmacist   [address] [place] [id number]     Contact information:   To schedule a MTM appointment: [phone]  For questions or concerns, please send a [employer] message or call the clinic at [phone].    For more urgent concerns that do not require 911, please call [phone] after hours/weekends and ask to speak with the Endocrinologist on call.      Telemedicine Visit Details  The patient's medications can be safely assessed via a telemedicine encounter.  Type of service:  Telephone visit  Originating Location (pt. Location): Home    Distant Location (provider location):  On-site  Start Time:  10:30 AM  End Time: 10:51 AM     Medication Therapy Recommendations  No medication therapy recommendations to display

## 2025-02-18 ENCOUNTER — MYC REFILL (OUTPATIENT)
Dept: FAMILY MEDICINE | Facility: CLINIC | Age: 50
End: 2025-02-18
Payer: MEDICARE

## 2025-02-18 ENCOUNTER — DOCUMENTATION ONLY (OUTPATIENT)
Dept: OTOLARYNGOLOGY | Facility: CLINIC | Age: 50
End: 2025-02-18
Payer: MEDICARE

## 2025-02-18 DIAGNOSIS — E53.8 B12 DEFICIENCY: ICD-10-CM

## 2025-02-18 RX ORDER — SYRINGE WITH NEEDLE, 1 ML 25GX5/8"
SYRINGE, EMPTY DISPOSABLE MISCELLANEOUS
Qty: 12 EACH | Refills: 3 | Status: SHIPPED | OUTPATIENT
Start: 2025-02-18

## 2025-02-18 RX ORDER — CYANOCOBALAMIN 1000 UG/ML
INJECTION, SOLUTION INTRAMUSCULAR; SUBCUTANEOUS
Qty: 3 ML | Refills: 2 | OUTPATIENT
Start: 2025-02-18

## 2025-02-18 ASSESSMENT — ANXIETY QUESTIONNAIRES
7. FEELING AFRAID AS IF SOMETHING AWFUL MIGHT HAPPEN: SEVERAL DAYS
3. WORRYING TOO MUCH ABOUT DIFFERENT THINGS: SEVERAL DAYS
7. FEELING AFRAID AS IF SOMETHING AWFUL MIGHT HAPPEN: SEVERAL DAYS
1. FEELING NERVOUS, ANXIOUS, OR ON EDGE: MORE THAN HALF THE DAYS
4. TROUBLE RELAXING: MORE THAN HALF THE DAYS
8. IF YOU CHECKED OFF ANY PROBLEMS, HOW DIFFICULT HAVE THESE MADE IT FOR YOU TO DO YOUR WORK, TAKE CARE OF THINGS AT HOME, OR GET ALONG WITH OTHER PEOPLE?: VERY DIFFICULT
GAD7 TOTAL SCORE: 10
6. BECOMING EASILY ANNOYED OR IRRITABLE: MORE THAN HALF THE DAYS
IF YOU CHECKED OFF ANY PROBLEMS ON THIS QUESTIONNAIRE, HOW DIFFICULT HAVE THESE PROBLEMS MADE IT FOR YOU TO DO YOUR WORK, TAKE CARE OF THINGS AT HOME, OR GET ALONG WITH OTHER PEOPLE: VERY DIFFICULT
GAD7 TOTAL SCORE: 10
5. BEING SO RESTLESS THAT IT IS HARD TO SIT STILL: SEVERAL DAYS
2. NOT BEING ABLE TO STOP OR CONTROL WORRYING: SEVERAL DAYS
GAD7 TOTAL SCORE: 10

## 2025-02-18 NOTE — PROGRESS NOTES
Speech therapy treatment plan for dates 2/11/25-5-9-25 faxed to edvin mazariegos [facility]. Fax number [phone]. [facility] faxed.

## 2025-02-19 ENCOUNTER — VIRTUAL VISIT (OUTPATIENT)
Facility: CLINIC | Age: 50
End: 2025-02-19
Payer: MEDICARE

## 2025-02-19 ENCOUNTER — TELEPHONE (OUTPATIENT)
Dept: OTOLARYNGOLOGY | Facility: CLINIC | Age: 50
End: 2025-02-19
Payer: MEDICARE

## 2025-02-19 DIAGNOSIS — F41.1 GENERALIZED ANXIETY DISORDER: ICD-10-CM

## 2025-02-19 DIAGNOSIS — F33.1 MAJOR DEPRESSIVE DISORDER, RECURRENT EPISODE, MODERATE (H): Primary | ICD-10-CM

## 2025-02-19 NOTE — PROGRESS NOTES
[facility]                                     Progress Note    Patient Name: Joel Pineda  Date: February 19, 2025             Service Type: Individual      Session Start Time: 2:01 PM Session End Time: 2:56 PM      Session Length: 55 minutes    Session #: 14    Attendees: Client attended alone    Service Modality:  Video Visit:      Provider verified identity through the following two step process.  Patient provided:  Patient photo and Patient is known previously to provider    Telemedicine Visit: The patient's condition can be safely assessed and treated via synchronous audio and visual telemedicine encounter.      Reason for Telemedicine Visit: Patient has requested telehealth visit    Originating Site (Patient Location): Patient's home    Distant Site (Provider Location): Provider Remote Setting- Home Office    Consent:  The patient/guardian has verbally consented to: the potential risks and benefits of telemedicine (video visit) versus in person care; bill my insurance or make self-payment for services provided; and responsibility for payment of non-covered services.     Patient would like the video invitation sent by:  Text to cell phone: [phone]    Mode of Communication:  Video Conference via [facility]    Distant Location (Provider):  Off-site    As the provider I attest to compliance with applicable laws and regulations related to telemedicine.    DATA  Interactive Complexity: Yes, visit entailed Interactive Complexity evidenced by:  -Caregiver emotions/behavior that interfere with implementation of the treatment plan Patient's presenting concerns require more intensive intervention than could be completed within the usual service. Provider used clinical judgment in determining the necessary length for the session to benefit the patient's needs.     Crisis: No        Progress Since Last Session (Related to Symptoms / Goals / Homework):   Symptoms: No change Tito continues to  report low mood with passive suicidal ideation, ongoing anxiety and panic.    Homework: Achieved / completed to satisfaction - using DBT skills for emotion regulation.      Episode of Care Goals: Satisfactory progress - CONTEMPLATION (Considering change and yet undecided); Intervened by assessing the negative and positive thinking (ambivalence) about behavior change     Current / Ongoing Stressors and Concerns:   Tito reports ongoing passive suicidal thoughts, 'maybe a little better, the deluge of medical news has quieted down,' which has reduced his sense of overwhelm. He describes 'weird energy' between him and mother - not sure if he is pushing her away due to concern she won't be around much longer as she is due to have TAVR next week. Feels mother has a 'need to be seen' when she sends jenna via Facebook. Some concern that Risperdal might be contributing to dysphagia, definitely recommend following up with psychiatry about this.  Discussed if attending DBT support group again could be helpful for additional support and review of DBT skills. Reports he still has dishes on his counter from his 48th birthday, feels things sometimes have to get to the point of being really bad. Discussed if [facility] support might be helpful, encouraged to contact [facility] through [facility] [email or web address] or phone: [phone]. Discussed 5 minute rule for unenjoyable activities like putting dishes in .      Treatment Objective(s) Addressed in This Session:   use thought-stopping strategy daily to reduce intrusive thoughts  state understanding of stressors and relationship to physical symptoms  Feel less tired and more energy during the day   Identify negative self-talk and behaviors: challenge core beliefs, myths, and actions  Decrease thoughts that you'd be better off dead or of suicide / self-harm  reduce their emotional vulnerability by 40% during the Emotion Regulation Module (6-8 weeks)       Intervention:      CBT: cognitive challenging, restructuring, reframing Behavioral activation techniques, grounding strategies  PCT: supportive autonomy, unconditional positive regard, empathic reflection, active listening  DBT: check the facts, self-soothing with the 5 senses, radical acceptance    Assessments completed prior to visit:  The following assessments were completed by patient for this visit:  The following assessments were completed by patient for this visit:  PHQ9:       12/3/2024    12:12 PM 12/10/2024     2:20 PM 12/17/2024     2:51 PM 1/1/2025     3:48 PM 1/14/2025     9:37 AM 1/28/2025     8:32 AM 2/18/2025    10:29 AM   PHQ-9 SCORE   PHQ-9 Total Score MyChart 10 (Moderate depression) 10 (Moderate depression) 15 (Moderately severe depression) 8 (Mild depression) 10 (Moderate depression) 10 (Moderate depression) 10 (Moderate depression)   PHQ-9 Total Score 10  10  15  8  10  10  10        Patient-reported     GAD7:       7/18/2024     9:53 AM 8/1/2024     9:28 AM 10/20/2024     2:25 PM 12/3/2024    12:14 PM 12/17/2024     2:51 PM 1/1/2025     3:50 PM 2/18/2025     2:18 PM   YUDI-7 SCORE   Total Score 11 (moderate anxiety) 7 (mild anxiety) 7 (mild anxiety) 9 (mild anxiety) 12 (moderate anxiety) 8 (mild anxiety) 10 (moderate anxiety)   Total Score 11 7 7 9  12  8  10        Patient-reported         ASSESSMENT: Current Emotional / Mental Status (status of significant symptoms):   Risk status (Self / Other harm or suicidal ideation)   Patient reports the following current fears or concerns for personal safety: Ongoing passive thoughts of suicide with absence of plan or intent and agreed to follow his previously developed [facility] Interactive Safety Plan.   Patient denies current or recent suicidal ideation or behaviors.   Patient denies current or recent homicidal ideation or behaviors.   Patient denies current or recent self injurious behavior or ideation.   Patient denies other safety concerns.   Patient  reports there has been no change in risk factors since their last session.     Patient reports there has been no change in protective factors since their last session.     A safety and risk management plan has been developed including: Patient consented to co-developed safety plan on 10/21/2024.  Safety and risk management plan was reviewed.   Patient agreed to use safety plan should any safety concerns arise.  A copy was made available to the patient.     Appearance:   Appropriate    Eye Contact:   Fair    Psychomotor Behavior: Normal    Attitude:   Cooperative    Orientation:   All   Speech    Rate / Production: Normal     Volume:  Normal    Mood:    Anxious  Depressed  Sad    Affect:    Subdued  Worrisome    Thought Content:  Clear    Thought Form:  Coherent  Logical    Insight:    Good      Medication Review:   No changes to current psychiatric medication(s) Reports psychiatrist encouraged him to take Klonopin more regularly.       Medication Compliance:   Yes - may not take some PRNs as often as he could     Changes in Health Issues:   None reported     Chemical Use Review:   Substance Use: Chemical use reviewed, no active concerns identified      Tobacco Use: No current tobacco use.      Diagnosis:  296.32 (F33.1) Major Depressive Disorder, Recurrent Episode, Moderate _.  300.02 (F41.1) Generalized Anxiety Disorder.    Collateral Reports Completed:   Not Applicable    PLAN: (Patient Tasks / Therapist Tasks / Other)    Patient to continue to use non-judgmental stance and cope ahead skills.     Patient and provider will continue practicing how to reframe automatic thinking from negative situations.     Patient will practice radical self-compassion techniques to decrease shame.     Patient to discuss if he is eligible for [facility] services with his psychiatrist or by calling [facility].    Shirley Bartlett PsyD LP                                                          ______________________________________________________________________    Individual Treatment Plan    Patient's Name: Joel Pineda  YOB: 1975    Date of Creation: October 30, 2024   Date Treatment Plan Last Reviewed/Revised: January 29, 2025      DSM5 Diagnoses: 296.32 (F33.1) Major Depressive Disorder, Recurrent Episode, Moderate _ or 300.02 (F41.1) Generalized Anxiety Disorder  Psychosocial / Contextual Factors: recent surgery and resulting acute pain, chronic pain, chronic mental health concerns, mobility limitations  PROMIS (reviewed every 90 days):   PROMIS-10 Scores        1/15/2025    12:04 PM 2/11/2025     2:54 PM 2/18/2025     2:19 PM   PROMIS-10 Total Score w/o Sub Scores   PROMIS TOTAL - SUBSCORES 21  21  18        Patient-reported        Referral / Collaboration:  Referral to another professional/service is not indicated at this time..    Anticipated number of session for this episode of care: 9-12 sessions  Anticipation frequency of session: Weekly  Anticipated Duration of each session: 53 or more minutes  Treatment plan will be reviewed in 90 days or when goals have been changed.     MeasurableTreatment Goal(s) related to diagnosis / functional impairment(s)  Goal 1: Patient will decrease anxiety by 75% as evidenced by YUDI-7    I will know I've met my goal when I am at peace with where I'm at.       Objective #A (Patient Action)                          Patient will identify the situations / thoughts that contribute to feeling anxious.  Status: Continued - Date(s):1/29/2025       Intervention(s)  Therapist will process anxiety-proving emotions.     Objective #B  Patient will use at least 3 coping skills for anxiety management in the next 4 weeks.  Status: Continued - Date(s):1/29/2025        Intervention(s)  Therapist will assign homework : coping skills practice to decrease anxiety .     Objective #C  Patient will use cognitive strategies identified in therapy to challenge anxious  thoughts.  Status: Continued - Date(s):1/29/2025        Intervention(s)  Therapist will  teach the patient ways to reframe negative core beliefs that contribute to anxiety.     Goal 2 Patient will report pain levels are consistently rated at 2/10 per patient report.   I will know I've met my goal when my pain is less disruptive.      Objective #A (Patient Action)    Patient will identify 5 strategies for managing pain.  Status: Continued - Date(s):1/29/2025     Intervention(s)  Therapist will teach  strategies to manage pain and assign homework to use 3 strategies daily .    Objective #B  Patient will  engage in physical activity and PT exercises 2-3 times daily .  Status: Continued - Date(s):1/29/2025      Intervention(s)  Therapist will assign homework to engage in at minimum one PT or physical activity daily .    Objective #C  Patient will Identify negative self-talk and behaviors: challenge core beliefs, myths, and actions.  Status: Continued - Date(s):1/29/2025      Intervention(s)  Therapist will teach non-judgmental stance and help patient reframe negative self-talk  .      Goal 3: Patient will report reduction in depressive symptoms as evidenced by PHQ-9 score reduction of 75% or greater.     I will know I've met my goal when I feel like I have purpose in life.      Objective #A (Patient Action)    Status: Continued - Date(s):1/29/2025      Patient will Decrease frequency and intensity of feeling down, depressed, hopeless.    Intervention(s)  Therapist will teach emotional recognition/identification. Therapist will reinforce use of emotion regulation skills .    Objective #B  Patient will use healthy communication when describing his emotions or when setting boundaries with others.     Status: Continued - Date(s):1/29/2025      Intervention(s)  Therapist will teach Therapist will teach emotional regulation skills and interpersonal effectiveness skills to improve quality of communication.     Objective  #C  Patient will track and record at least 3 pleasant exchanges with family members such as mother, father .  Status: Continued - Date(s):1/29/2025     Intervention(s)  Therapist will teach about healthy boundaries. Therapist will encourage patient to focus on positive interactions with family and use cope ahead to increase likelihood of pleasant experience as an outcome .    Patient has reviewed and agreed to the above plan.      Shirley Bartlett PsyD LP  January 29, 2025    Answers submitted by the patient for this visit:  Patient Health Questionnaire (Submitted on 12/3/2024)  If you checked off any problems, how difficult have these problems made it for you to do your work, take care of things at home, or get along with other people?: Very difficult  PHQ9 TOTAL SCORE: 10  Patient Health Questionnaire (G7) (Submitted on 12/3/2024)  YUDI 7 TOTAL SCORE: 9    Answers submitted by the patient for this visit:  Patient Health Questionnaire (Submitted on 12/17/2024)  If you checked off any problems, how difficult have these problems made it for you to do your work, take care of things at home, or get along with other people?: Very difficult  PHQ9 TOTAL SCORE: 15  Patient Health Questionnaire (G7) (Submitted on 12/17/2024)  YUDI 7 TOTAL SCORE: 12    Answers submitted by the patient for this visit:  Patient Health Questionnaire (Submitted on 1/14/2025)  If you checked off any problems, how difficult have these problems made it for you to do your work, take care of things at home, or get along with other people?: Somewhat difficult  PHQ9 TOTAL SCORE: 10    [facility] Safety Plan      Creation Date: 10/21/24       Step 1: Warning signs:    Warning Signs    Start thinking about death more      Step 2: Internal coping strategies - Things I can do to take my mind off my problems without contacting another person:    Strategies    Petting cats    Watching music videos    talk to family member    text friend Tono    hot bubble  bath      Step 3: People and social settings that provide distraction:    Name Contact Information    Tono - friend [phone]    Mother [phone]         Step 4: People whom I can ask for help during a crisis:    Name Contact Information    Keyana - sister [phone]      Step 5: Professionals or agencies I can contact during a crisis:    Clinician/Agency Name Phone Emergency Contact    Shirley Bartlett [phone]    ANU CHENEY        [facility] Emergency Department Emergency Department Address Emergency Department Phone    [facility]       Suicide Prevention Lifeline Phone: Call or Text 787  Crisis Text Line: Text HOME to 381206     Step 6: Making the environment safer (plan for lethal means safety):   Previous medications - dispose of unused or older medications     Optional: What is most important to me and worth living for?:   Niece  Mother  Sister  Cats     [place] Safety Plan. Shanita Lazcano and Ariel Lorenzo. Used with permission of the authors.

## 2025-02-19 NOTE — PATIENT INSTRUCTIONS
"Recommendations from today's MTM visit:                                                      Continue on Mounjaro 2.5mg weekly for an additional month, refill sent to pharmacy     Medications that could be related to dysphagia:  NSAIDS (nabumetone): can cause direct irritation to GI tract thus increasing risk of dysphagia or troubles swallowing     Medications that can increase risk of dry mouth thus troubles with swallowing: bupropion, clonazepam, Benadryl, divalproex, duloxetine, Lunesta, fexofenadine, methocarbamol, metoclopramide, montelukast, oxycodone, pregabalin, Ubrelvy    risperidone: is associated with extrapyramidal symptoms (EPS), also known as drug-induced movement disorders which can include acute dystonia, drug-induced parkinsonism, akathisia, and tardive dyskinesia. EPS presenting as dysphagia, esophageal dysmotility, or aspiration have also been reported with risperidone and other related medications, which may not be recognized as EPS     Follow-up: 3/17/25 at [facility]    Endocrine Team & Next Follow-Up:  3/17/25 with Traci  7/7/25 with Dr. Cuenca    It was great speaking with you today.  I value your experience and would be very thankful for your time in providing feedback in our clinic survey. In the next few days, you may receive an email or text message from [employer] with a link to a survey related to your  clinical pharmacist.\"     To schedule another MTM appointment, please call the clinic directly or you may call the MTM scheduling line at [phone].    My Clinical Pharmacist's contact information:                                                      Please feel free to contact me with any questions or concerns you have.      Traci Méndez), PharmD  Endocrine & Diabetes Medication Therapy Management Pharmacist   [address] [place] [address]     Contact information:   To schedule a MTM appointment: [phone]  For questions or concerns, please send a [employer] message or " call the clinic at [phone].    For more urgent concerns that do not require 734, please call [phone] after hours/weekends and ask to speak with the Endocrinologist on call.

## 2025-02-20 ENCOUNTER — MYC REFILL (OUTPATIENT)
Dept: PALLIATIVE MEDICINE | Facility: OTHER | Age: 50
End: 2025-02-20
Payer: MEDICARE

## 2025-02-20 DIAGNOSIS — M47.816 LUMBAR FACET ARTHROPATHY: ICD-10-CM

## 2025-02-20 DIAGNOSIS — M45.8 ANKYLOSING SPONDYLITIS OF SACRAL REGION (H): ICD-10-CM

## 2025-02-20 DIAGNOSIS — G89.29 CHRONIC INTRACTABLE PAIN: ICD-10-CM

## 2025-02-20 DIAGNOSIS — G43.111 INTRACTABLE MIGRAINE WITH AURA WITH STATUS MIGRAINOSUS: ICD-10-CM

## 2025-02-20 DIAGNOSIS — G62.9 PERIPHERAL POLYNEUROPATHY: ICD-10-CM

## 2025-02-20 RX ORDER — OXYCODONE HYDROCHLORIDE 5 MG/1
5 TABLET ORAL EVERY 6 HOURS PRN
Qty: 60 TABLET | Refills: 0 | Status: SHIPPED | OUTPATIENT
Start: 2025-02-20

## 2025-02-20 NOTE — TELEPHONE ENCOUNTER
Medication refill information reviewed.     Due date for oxyCODONE (ROXICODONE) 5 MG tablet is  03/02/25     Prescriptions prepped for review.     Will route to provider.             
Received call from patient requesting refill(s) oxyCODONE (ROXICODONE) 5 MG tablet    Last dispensed from pharmacy on 1/29/2025    Patient's last office/virtual visit by prescribing provider on 1/27/2025  Next office/virtual appointment scheduled for 3/27/2025    Last urine drug screen date 1/27/2025  Current opioid agreement on file (completed within the last year) Yes Date of opioid agreement: 1/27/2025    E-prescribe to      [facility] [id number] [place], [place] - [address] [address]    Will route to nursing [place] for review and preparation of prescription(s).     Klaudia Kaur MA  [facility] [place]   
Refills have been requested for the following medications:         oxyCODONE (ROXICODONE) 5 MG tablet [Stacia Jack]     Preferred pharmacy: [facility] [id number] [facility] [address] [address]  
No

## 2025-03-01 ENCOUNTER — HEALTH MAINTENANCE LETTER (OUTPATIENT)
Age: 50
End: 2025-03-01

## 2025-03-01 DIAGNOSIS — I10 ESSENTIAL HYPERTENSION WITH GOAL BLOOD PRESSURE LESS THAN 140/90: ICD-10-CM

## 2025-03-03 RX ORDER — RAMIPRIL 10 MG/1
CAPSULE ORAL
Qty: 90 CAPSULE | Refills: 2 | Status: SHIPPED | OUTPATIENT
Start: 2025-03-03

## 2025-03-06 ENCOUNTER — VIRTUAL VISIT (OUTPATIENT)
Facility: CLINIC | Age: 50
End: 2025-03-06
Payer: MEDICARE

## 2025-03-06 DIAGNOSIS — F33.1 MAJOR DEPRESSIVE DISORDER, RECURRENT EPISODE, MODERATE (H): Primary | ICD-10-CM

## 2025-03-06 DIAGNOSIS — F41.1 GENERALIZED ANXIETY DISORDER: ICD-10-CM

## 2025-03-06 NOTE — PROGRESS NOTES
[facility]                                     Progress Note    Patient Name: Joel Pineda  Date: March 6, 2025              Service Type: Individual      Session Start Time: 9:00 AM Session End Time: 9:41 AM      Session Length: 41 minutes    Session #: 15    Attendees: Client attended alone    Service Modality:  Video Visit:      Provider verified identity through the following two step process.  Patient provided:  Patient photo and Patient is known previously to provider    Telemedicine Visit: The patient's condition can be safely assessed and treated via synchronous audio and visual telemedicine encounter.      Reason for Telemedicine Visit: Patient has requested telehealth visit    Originating Site (Patient Location): Patient's home    Distant Site (Provider Location): Provider Remote Setting- Home Office    Consent:  The patient/guardian has verbally consented to: the potential risks and benefits of telemedicine (video visit) versus in person care; bill my insurance or make self-payment for services provided; and responsibility for payment of non-covered services.     Patient would like the video invitation sent by:  Text to cell phone: [phone]    Mode of Communication:  Video Conference via [facility]    Distant Location (Provider):  Off-site    As the provider I attest to compliance with applicable laws and regulations related to telemedicine.    DATA  Interactive Complexity: Yes, visit entailed Interactive Complexity evidenced by:  -Caregiver emotions/behavior that interfere with implementation of the treatment plan Patient's presenting concerns require more intensive intervention than could be completed within the usual service. Provider used clinical judgment in determining the necessary length for the session to benefit the patient's needs.     Crisis: No        Progress Since Last Session (Related to Symptoms / Goals / Homework):   Symptoms: No change Tito continues to report  low mood with passive suicidal ideation, ongoing anxiety and panic.    Homework: Achieved / completed to satisfaction - using DBT skills for emotion regulation.      Episode of Care Goals: Satisfactory progress - CONTEMPLATION (Considering change and yet undecided); Intervened by assessing the negative and positive thinking (ambivalence) about behavior change     Current / Ongoing Stressors and Concerns:   Onurs mother has been in hospital for the past couple weeks due to complications from TAVR procedure, she has had multiple strokes as well. However she was just transferred from ICU yesterday. Brother is currently staying with him through today - he reports they got into an argument yesterday about his use of [employer] [facility] to find resources for mother's vision issues due to TIAs. His sister has been staying at their parents, has no immediate plans to return to [place]. He has swallow study tomorrow. Discontinued his Risperidone, psychiatrist changed it to Lurasidone due to other providers expressing concern about potential side effects from Risperidone. He has urges to increase dose of Latuda, discussed waiting for efficacy which could take up to 6 weeks and he has been on the medication for a week. Validated his anxiety about mother's current situation, discussed focusing on present moment and using self-soothing as often as possible. Reviewed some basic grounding strategies. He reports absence of passive suicidal ideation in the past couple weeks, 'I've been too busy.' Discussed DBT group, he does not feel he could manage another appointment at this time. Explored basic self-cares to engage in daily and ways to regulate his emotions to widen his window of tolerance.        Treatment Objective(s) Addressed in This Session:   use distraction each time intrusive worry surfaces  state understanding of stressors and relationship to physical symptoms  Decrease frequency and intensity of feeling down, depressed,  hopeless  Identify negative self-talk and behaviors: challenge core beliefs, myths, and actions  Decrease thoughts that you'd be better off dead or of suicide / self-harm  reduce their emotional vulnerability by 40% during the Emotion Regulation Module (6-8 weeks)      Intervention:      CBT: cognitive challenging, restructuring, reframing Behavioral activation techniques, grounding strategies  PCT: supportive autonomy, unconditional positive regard, empathic reflection, active listening  DBT: self-soothing with the 5 senses, TIP Skill    Assessments completed prior to visit:  The following assessments were completed by patient for this visit:  The following assessments were completed by patient for this visit:  PHQ9:       12/10/2024     2:20 PM 12/17/2024     2:51 PM 1/1/2025     3:48 PM 1/14/2025     9:37 AM 1/28/2025     8:32 AM 2/18/2025    10:29 AM 3/4/2025     2:20 PM   PHQ-9 SCORE   PHQ-9 Total Score MyChart 10 (Moderate depression) 15 (Moderately severe depression) 8 (Mild depression) 10 (Moderate depression) 10 (Moderate depression) 10 (Moderate depression) 6 (Mild depression)   PHQ-9 Total Score 10  15  8  10  10  10  6        Patient-reported     GAD7:       7/18/2024     9:53 AM 8/1/2024     9:28 AM 10/20/2024     2:25 PM 12/3/2024    12:14 PM 12/17/2024     2:51 PM 1/1/2025     3:50 PM 2/18/2025     2:18 PM   YUDI-7 SCORE   Total Score 11 (moderate anxiety) 7 (mild anxiety) 7 (mild anxiety) 9 (mild anxiety) 12 (moderate anxiety) 8 (mild anxiety) 10 (moderate anxiety)   Total Score 11 7 7 9  12  8  10        Patient-reported         ASSESSMENT: Current Emotional / Mental Status (status of significant symptoms):   Risk status (Self / Other harm or suicidal ideation)   Patient reports the following current fears or concerns for personal safety: Ongoing passive thoughts of suicide with absence of plan or intent and agreed to follow his previously developed [employer] Safety Plan.   Patient denies current  or recent suicidal ideation or behaviors.   Patient denies current or recent homicidal ideation or behaviors.   Patient denies current or recent self injurious behavior or ideation.   Patient denies other safety concerns.   Patient reports there has been no change in risk factors since their last session.     Patient reports there has been no change in protective factors since their last session.     A safety and risk management plan has been developed including: Patient consented to co-developed safety plan on 10/21/2024.  Safety and risk management plan was reviewed.   Patient agreed to use safety plan should any safety concerns arise.  A copy was made available to the patient.     Appearance:   Appropriate    Eye Contact:   Fair    Psychomotor Behavior: Normal    Attitude:   Cooperative    Orientation:   All   Speech    Rate / Production: Impoverished     Volume:  Normal    Mood:    Anxious  Depressed  Irritable  Sad    Affect:    Subdued  Worrisome    Thought Content:  Clear    Thought Form:  Tangential    Insight:    Fair      Medication Review:   Changes to psychiatric medications, see updated Medication List in EPIC.       Medication Compliance:   Yes - may not take some PRNs as often as he could     Changes in Health Issues:   None reported     Chemical Use Review:   Substance Use: Chemical use reviewed, no active concerns identified      Tobacco Use: No current tobacco use.      Diagnosis:  296.32 (F33.1) Major Depressive Disorder, Recurrent Episode, Moderate _.  300.02 (F41.1) Generalized Anxiety Disorder.    Collateral Reports Completed:   Not Applicable    PLAN: (Patient Tasks / Therapist Tasks / Other)    Patient to continue to use non-judgmental stance and cope ahead skills.     Patient and provider will continue practicing how to reframe automatic thinking from negative situations.     Patient will practice radical self-compassion techniques to decrease shame.     Patient to engage in basic self-cares  and focus on present moment.     Shirleyjoseph Bartlett, Ha LP                                                      ______________________________________________________________________    Individual Treatment Plan    Patient's Name: Joel Pineda  YOB: 1975    Date of Creation: October 30, 2024   Date Treatment Plan Last Reviewed/Revised: January 29, 2025      DSM5 Diagnoses: 296.32 (F33.1) Major Depressive Disorder, Recurrent Episode, Moderate _ or 300.02 (F41.1) Generalized Anxiety Disorder  Psychosocial / Contextual Factors: recent surgery and resulting acute pain, chronic pain, chronic mental health concerns, mobility limitations  PROMIS (reviewed every 90 days):   PROMIS-10 Scores        1/15/2025    12:04 PM 2/11/2025     2:54 PM 2/18/2025     2:19 PM   PROMIS-10 Total Score w/o Sub Scores   PROMIS TOTAL - SUBSCORES 21  21  18        Patient-reported        Referral / Collaboration:  Referral to another professional/service is not indicated at this time..    Anticipated number of session for this episode of care: 9-12 sessions  Anticipation frequency of session: Weekly  Anticipated Duration of each session: 53 or more minutes  Treatment plan will be reviewed in 90 days or when goals have been changed.     MeasurableTreatment Goal(s) related to diagnosis / functional impairment(s)  Goal 1: Patient will decrease anxiety by 75% as evidenced by YUDI-7    I will know I've met my goal when I am at peace with where I'm at.       Objective #A (Patient Action)                          Patient will identify the situations / thoughts that contribute to feeling anxious.  Status: Continued - Date(s):1/29/2025       Intervention(s)  Therapist will process anxiety-proving emotions.     Objective #B  Patient will use at least 3 coping skills for anxiety management in the next 4 weeks.  Status: Continued - Date(s):1/29/2025        Intervention(s)  Therapist will assign homework : coping skills practice to decrease  anxiety .     Objective #C  Patient will use cognitive strategies identified in therapy to challenge anxious thoughts.  Status: Continued - Date(s):1/29/2025        Intervention(s)  Therapist will  teach the patient ways to reframe negative core beliefs that contribute to anxiety.     Goal 2 Patient will report pain levels are consistently rated at 2/10 per patient report.   I will know I've met my goal when my pain is less disruptive.      Objective #A (Patient Action)    Patient will identify 5 strategies for managing pain.  Status: Continued - Date(s):1/29/2025     Intervention(s)  Therapist will teach  strategies to manage pain and assign homework to use 3 strategies daily .    Objective #B  Patient will  engage in physical activity and PT exercises 2-3 times daily .  Status: Continued - Date(s):1/29/2025      Intervention(s)  Therapist will assign homework to engage in at minimum one PT or physical activity daily .    Objective #C  Patient will Identify negative self-talk and behaviors: challenge core beliefs, myths, and actions.  Status: Continued - Date(s):1/29/2025      Intervention(s)  Therapist will teach non-judgmental stance and help patient reframe negative self-talk  .      Goal 3: Patient will report reduction in depressive symptoms as evidenced by PHQ-9 score reduction of 75% or greater.     I will know I've met my goal when I feel like I have purpose in life.      Objective #A (Patient Action)    Status: Continued - Date(s):1/29/2025      Patient will Decrease frequency and intensity of feeling down, depressed, hopeless.    Intervention(s)  Therapist will teach emotional recognition/identification. Therapist will reinforce use of emotion regulation skills .    Objective #B  Patient will use healthy communication when describing his emotions or when setting boundaries with others.     Status: Continued - Date(s):1/29/2025      Intervention(s)  Therapist will teach Therapist will teach emotional  regulation skills and interpersonal effectiveness skills to improve quality of communication.     Objective #C  Patient will track and record at least 3 pleasant exchanges with family members such as mother, father .  Status: Continued - Date(s):1/29/2025     Intervention(s)  Therapist will teach about healthy boundaries. Therapist will encourage patient to focus on positive interactions with family and use cope ahead to increase likelihood of pleasant experience as an outcome .    Patient has reviewed and agreed to the above plan.      Shirley Bartlett PsyD LP  January 29, 2025    Answers submitted by the patient for this visit:  Patient Health Questionnaire (Submitted on 12/3/2024)  If you checked off any problems, how difficult have these problems made it for you to do your work, take care of things at home, or get along with other people?: Very difficult  PHQ9 TOTAL SCORE: 10  Patient Health Questionnaire (G7) (Submitted on 12/3/2024)  YUDI 7 TOTAL SCORE: 9    Answers submitted by the patient for this visit:  Patient Health Questionnaire (Submitted on 12/17/2024)  If you checked off any problems, how difficult have these problems made it for you to do your work, take care of things at home, or get along with other people?: Very difficult  PHQ9 TOTAL SCORE: 15  Patient Health Questionnaire (G7) (Submitted on 12/17/2024)  YUDI 7 TOTAL SCORE: 12    Answers submitted by the patient for this visit:  Patient Health Questionnaire (Submitted on 1/14/2025)  If you checked off any problems, how difficult have these problems made it for you to do your work, take care of things at home, or get along with other people?: Somewhat difficult  PHQ9 TOTAL SCORE: 10    [facility] Safety Plan      Creation Date: 10/21/24       Step 1: Warning signs:    Warning Signs    Start thinking about death more      Step 2: Internal coping strategies - Things I can do to take my mind off my problems without contacting another person:     Strategies    Petting cats    Watching music videos    talk to family member    text friend Tono    hot bubble bath      Step 3: People and social settings that provide distraction:    Name Contact Information    Tono - friend [phone]    Mother [phone]         Step 4: People whom I can ask for help during a crisis:    Name Contact Information    Keyana - sister [phone]      Step 5: Professionals or agencies I can contact during a crisis:    Clinician/Agency Name Phone Emergency Contact    Shirley Bartlett [phone]    ANU CHENEY        [facility] Emergency Department Emergency Department Address Emergency Department Phone    [facility]       Suicide Prevention Lifeline Phone: Call or Text 344  Crisis Text Line: Text HOME to 054082     Step 6: Making the environment safer (plan for lethal means safety):   Previous medications - dispose of unused or older medications     Optional: What is most important to me and worth living for?:   Niece  Mother  Sister  Cats     [place] Safety Plan. Shanita Lazcano and Ariel Lorenzo. Used with permission of the authors.

## 2025-03-07 ENCOUNTER — ANCILLARY PROCEDURE (OUTPATIENT)
Dept: GENERAL RADIOLOGY | Facility: CLINIC | Age: 50
End: 2025-03-07
Attending: OTOLARYNGOLOGY
Payer: MEDICARE

## 2025-03-07 DIAGNOSIS — R13.12 OROPHARYNGEAL DYSPHAGIA: ICD-10-CM

## 2025-03-07 PROCEDURE — 74230 X-RAY XM SWLNG FUNCJ C+: CPT | Mod: GC | Performed by: RADIOLOGY

## 2025-03-07 RX ORDER — BARIUM SULFATE 400 MG/ML
SUSPENSION ORAL ONCE
Status: COMPLETED | OUTPATIENT
Start: 2025-03-07 | End: 2025-03-07

## 2025-03-07 RX ADMIN — BARIUM SULFATE 60 ML: 400 SUSPENSION ORAL at 10:31

## 2025-03-11 ENCOUNTER — DOCUMENTATION ONLY (OUTPATIENT)
Dept: SPEECH THERAPY | Facility: CLINIC | Age: 50
End: 2025-03-11
Payer: MEDICARE

## 2025-03-11 NOTE — PROGRESS NOTES
Video Esophagram Review Rounds  Imaging Review of MBSS conducted with attending physician Josefina Patino and reviewed/discussed with SLP Heather Childers, Radha Sawant, and/or Saniya Storey and with GI representative, KEVAN Chowdhury    Relevant Background:Parkinsonism    Esophagram: 3/7/2025    MBSS Date: 3/7/38840    Findings:  Pharyngeal Weakness:No  Epiglottic dysfunction: No  Penetration: No  Aspiration: No  UES dysfunction: Yes: Appears to close early  Details: Residue sits in post-cricoid space      Recommendations:  Diet: Reg/thin  Start with voice therapy  If continued to be bothered by post-cricoid residue, will refer to GI for empiric dilation.

## 2025-03-12 ENCOUNTER — VIRTUAL VISIT (OUTPATIENT)
Facility: CLINIC | Age: 50
End: 2025-03-12
Payer: MEDICARE

## 2025-03-12 DIAGNOSIS — F33.1 MAJOR DEPRESSIVE DISORDER, RECURRENT EPISODE, MODERATE (H): Primary | ICD-10-CM

## 2025-03-12 DIAGNOSIS — F41.1 GENERALIZED ANXIETY DISORDER: ICD-10-CM

## 2025-03-12 DIAGNOSIS — E03.9 ACQUIRED HYPOTHYROIDISM: ICD-10-CM

## 2025-03-12 PROCEDURE — 90834 PSYTX W PT 45 MINUTES: CPT | Mod: 95 | Performed by: PSYCHOLOGIST

## 2025-03-12 RX ORDER — LEVOTHYROXINE SODIUM 75 UG/1
TABLET ORAL
Qty: 90 TABLET | Refills: 0 | Status: SHIPPED | OUTPATIENT
Start: 2025-03-12

## 2025-03-12 ASSESSMENT — ANXIETY QUESTIONNAIRES
7. FEELING AFRAID AS IF SOMETHING AWFUL MIGHT HAPPEN: NEARLY EVERY DAY
5. BEING SO RESTLESS THAT IT IS HARD TO SIT STILL: SEVERAL DAYS
GAD7 TOTAL SCORE: 15
IF YOU CHECKED OFF ANY PROBLEMS ON THIS QUESTIONNAIRE, HOW DIFFICULT HAVE THESE PROBLEMS MADE IT FOR YOU TO DO YOUR WORK, TAKE CARE OF THINGS AT HOME, OR GET ALONG WITH OTHER PEOPLE: VERY DIFFICULT
GAD7 TOTAL SCORE: 15
8. IF YOU CHECKED OFF ANY PROBLEMS, HOW DIFFICULT HAVE THESE MADE IT FOR YOU TO DO YOUR WORK, TAKE CARE OF THINGS AT HOME, OR GET ALONG WITH OTHER PEOPLE?: VERY DIFFICULT
7. FEELING AFRAID AS IF SOMETHING AWFUL MIGHT HAPPEN: NEARLY EVERY DAY
3. WORRYING TOO MUCH ABOUT DIFFERENT THINGS: MORE THAN HALF THE DAYS
4. TROUBLE RELAXING: NEARLY EVERY DAY
GAD7 TOTAL SCORE: 15
2. NOT BEING ABLE TO STOP OR CONTROL WORRYING: MORE THAN HALF THE DAYS
1. FEELING NERVOUS, ANXIOUS, OR ON EDGE: NEARLY EVERY DAY
6. BECOMING EASILY ANNOYED OR IRRITABLE: SEVERAL DAYS

## 2025-03-12 NOTE — PROGRESS NOTES
[facility]                                     Progress Note    Patient Name: Joel Pineda  Date: March 12, 2025              Service Type: Individual      Session Start Time: 2:00 PM Session End Time: 2:50 PM     Session Length: 50 minutes    Session #: 16    Attendees: Client attended alone    Service Modality:  Video Visit:      Provider verified identity through the following two step process.  Patient provided:  Patient photo and Patient is known previously to provider    Telemedicine Visit: The patient's condition can be safely assessed and treated via synchronous audio and visual telemedicine encounter.      Reason for Telemedicine Visit: Patient convenience (e.g. access to timely appointments / distance to available provider)    Originating Site (Patient Location): Patient's home    Distant Site (Provider Location): Provider Remote Setting- Home Office    Consent:  The patient/guardian has verbally consented to: the potential risks and benefits of telemedicine (video visit) versus in person care; bill my insurance or make self-payment for services provided; and responsibility for payment of non-covered services.     Patient would like the video invitation sent by:  Text to cell phone: [phone]    Mode of Communication:  Video Conference via Amwell    Distant Location (Provider):  Off-site    As the provider I attest to compliance with applicable laws and regulations related to telemedicine.    DATA  Interactive Complexity: No   Crisis: No        Progress Since Last Session (Related to Symptoms / Goals / Homework):   Symptoms: No change Tito continues to report low mood with passive suicidal ideation, ongoing anxiety and panic.    Homework: Achieved / completed to satisfaction - using DBT skills for emotion regulation.      Episode of Care Goals: Satisfactory progress - CONTEMPLATION (Considering change and yet undecided); Intervened by assessing the negative and positive  thinking (ambivalence) about behavior change     Current / Ongoing Stressors and Concerns:   Tito's mother was transferred back to ICU with some complications. This is understandably distressing to him. He has been spending quite a bit of time at the hospital, however he has been giving himself one day off from visiting. Brother had to head back home after initially extending his stay a couple days. He has been actively engaging in self-care as often as able, walking more due to visiting mother almost daily. Sister sent a really nice photo of mother today. Sleep has been somewhat disrupted due to mother's health issues.        Treatment Objective(s) Addressed in This Session:   use distraction each time intrusive worry surfaces  state understanding of stressors and relationship to physical symptoms  Decrease frequency and intensity of feeling down, depressed, hopeless  Identify negative self-talk and behaviors: challenge core beliefs, myths, and actions  Decrease thoughts that you'd be better off dead or of suicide / self-harm  reduce their emotional vulnerability by 40% during the Emotion Regulation Module (6-8 weeks)      Intervention:      CBT: cognitive challenging, restructuring, reframing Behavioral activation techniques, grounding strategies  PCT: supportive autonomy, unconditional positive regard, empathic reflection, active listening  DBT: self-soothing with the 5 senses, TIP Skill    Assessments completed prior to visit:  The following assessments were completed by patient for this visit:  The following assessments were completed by patient for this visit:  PHQ9:       12/17/2024     2:51 PM 1/1/2025     3:48 PM 1/14/2025     9:37 AM 1/28/2025     8:32 AM 2/18/2025    10:29 AM 3/4/2025     2:20 PM 3/12/2025    11:35 AM   PHQ-9 SCORE   PHQ-9 Total Score [facility] 15 (Moderately severe depression) 8 (Mild depression) 10 (Moderate depression) 10 (Moderate depression) 10 (Moderate depression) 6 (Mild  depression) 8 (Mild depression)   PHQ-9 Total Score 15  8  10  10  10  6  8        Patient-reported     GAD7:       8/1/2024     9:28 AM 10/20/2024     2:25 PM 12/3/2024    12:14 PM 12/17/2024     2:51 PM 1/1/2025     3:50 PM 2/18/2025     2:18 PM 3/12/2025    11:39 AM   YUDI-7 SCORE   Total Score 7 (mild anxiety) 7 (mild anxiety) 9 (mild anxiety) 12 (moderate anxiety) 8 (mild anxiety) 10 (moderate anxiety) 15 (severe anxiety)   Total Score 7 7 9  12  8  10  15        Patient-reported         ASSESSMENT: Current Emotional / Mental Status (status of significant symptoms):   Risk status (Self / Other harm or suicidal ideation)   Patient reports the following current fears or concerns for personal safety: Ongoing passive thoughts of suicide with absence of plan or intent and agreed to follow his previously developed [facility] Safety Plan.   Patient denies current or recent suicidal ideation or behaviors.   Patient denies current or recent homicidal ideation or behaviors.   Patient denies current or recent self injurious behavior or ideation.   Patient denies other safety concerns.   Patient reports there has been no change in risk factors since their last session.     Patient reports there has been no change in protective factors since their last session.     A safety and risk management plan has been developed including: Patient consented to co-developed safety plan on 10/21/2024.  Safety and risk management plan was reviewed.   Patient agreed to use safety plan should any safety concerns arise.  A copy was made available to the patient.     Appearance:   Appropriate    Eye Contact:   Fair    Psychomotor Behavior: Normal    Attitude:   Cooperative    Orientation:   All   Speech    Rate / Production: Impoverished     Volume:  Normal    Mood:    Anxious  Depressed  Irritable  Sad    Affect:    Subdued  Worrisome    Thought Content:  Clear    Thought Form:  Tangential    Insight:    Fair      Medication  Review:   Changes to psychiatric medications, see updated Medication List in EPIC.       Medication Compliance:   Yes - may not take some PRNs as often as he could     Changes in Health Issues:   None reported     Chemical Use Review:   Substance Use: Chemical use reviewed, no active concerns identified      Tobacco Use: No current tobacco use.      Diagnosis:  296.32 (F33.1) Major Depressive Disorder, Recurrent Episode, Moderate _.  300.02 (F41.1) Generalized Anxiety Disorder.    Collateral Reports Completed:   Not Applicable    PLAN: (Patient Tasks / Therapist Tasks / Other)    Patient to continue to use non-judgmental stance and cope ahead skills.     Patient and provider will continue practicing how to reframe automatic thinking from negative situations.     Patient will practice radical self-compassion techniques to decrease shame.     Patient to engage in basic self-cares and focus on present moment.     Shirley Bartlett PsyD LP                                                      ______________________________________________________________________    Individual Treatment Plan    Patient's Name: Joel Pineda  YOB: 1975    Date of Creation: October 30, 2024   Date Treatment Plan Last Reviewed/Revised: January 29, 2025      DSM5 Diagnoses: 296.32 (F33.1) Major Depressive Disorder, Recurrent Episode, Moderate _ or 300.02 (F41.1) Generalized Anxiety Disorder  Psychosocial / Contextual Factors: recent surgery and resulting acute pain, chronic pain, chronic mental health concerns, mobility limitations  PROMIS (reviewed every 90 days):   PROMIS-10 Scores        1/15/2025    12:04 PM 2/11/2025     2:54 PM 2/18/2025     2:19 PM   PROMIS-10 Total Score w/o Sub Scores   PROMIS TOTAL - SUBSCORES 21  21  18        Patient-reported        Referral / Collaboration:  Referral to another professional/service is not indicated at this time..    Anticipated number of session for this episode of care: 9-12  sessions  Anticipation frequency of session: Weekly  Anticipated Duration of each session: 53 or more minutes  Treatment plan will be reviewed in 90 days or when goals have been changed.     MeasurableTreatment Goal(s) related to diagnosis / functional impairment(s)  Goal 1: Patient will decrease anxiety by 75% as evidenced by YUDI-7    I will know I've met my goal when I am at peace with where I'm at.       Objective #A (Patient Action)                          Patient will identify the situations / thoughts that contribute to feeling anxious.  Status: Continued - Date(s):1/29/2025       Intervention(s)  Therapist will process anxiety-proving emotions.     Objective #B  Patient will use at least 3 coping skills for anxiety management in the next 4 weeks.  Status: Continued - Date(s):1/29/2025        Intervention(s)  Therapist will assign homework : coping skills practice to decrease anxiety .     Objective #C  Patient will use cognitive strategies identified in therapy to challenge anxious thoughts.  Status: Continued - Date(s):1/29/2025        Intervention(s)  Therapist will  teach the patient ways to reframe negative core beliefs that contribute to anxiety.     Goal 2 Patient will report pain levels are consistently rated at 2/10 per patient report.   I will know I've met my goal when my pain is less disruptive.      Objective #A (Patient Action)    Patient will identify 5 strategies for managing pain.  Status: Continued - Date(s):1/29/2025     Intervention(s)  Therapist will teach  strategies to manage pain and assign homework to use 3 strategies daily .    Objective #B  Patient will  engage in physical activity and PT exercises 2-3 times daily .  Status: Continued - Date(s):1/29/2025      Intervention(s)  Therapist will assign homework to engage in at minimum one PT or physical activity daily .    Objective #C  Patient will Identify negative self-talk and behaviors: challenge core beliefs, myths, and  actions.  Status: Continued - Date(s):1/29/2025      Intervention(s)  Therapist will teach non-judgmental stance and help patient reframe negative self-talk  .      Goal 3: Patient will report reduction in depressive symptoms as evidenced by PHQ-9 score reduction of 75% or greater.     I will know I've met my goal when I feel like I have purpose in life.      Objective #A (Patient Action)    Status: Continued - Date(s):1/29/2025      Patient will Decrease frequency and intensity of feeling down, depressed, hopeless.    Intervention(s)  Therapist will teach emotional recognition/identification. Therapist will reinforce use of emotion regulation skills .    Objective #B  Patient will use healthy communication when describing his emotions or when setting boundaries with others.     Status: Continued - Date(s):1/29/2025      Intervention(s)  Therapist will teach Therapist will teach emotional regulation skills and interpersonal effectiveness skills to improve quality of communication.     Objective #C  Patient will track and record at least 3 pleasant exchanges with family members such as mother, father .  Status: Continued - Date(s):1/29/2025     Intervention(s)  Therapist will teach about healthy boundaries. Therapist will encourage patient to focus on positive interactions with family and use cope ahead to increase likelihood of pleasant experience as an outcome .    Patient has reviewed and agreed to the above plan.      Shirley Bartlett PsyD LP  January 29, 2025    Answers submitted by the patient for this visit:  Patient Health Questionnaire (Submitted on 12/3/2024)  If you checked off any problems, how difficult have these problems made it for you to do your work, take care of things at home, or get along with other people?: Very difficult  PHQ9 TOTAL SCORE: 10  Patient Health Questionnaire (G7) (Submitted on 12/3/2024)  YUDI 7 TOTAL SCORE: 9    Answers submitted by the patient for this visit:  Patient Health  Questionnaire (Submitted on 12/17/2024)  If you checked off any problems, how difficult have these problems made it for you to do your work, take care of things at home, or get along with other people?: Very difficult  PHQ9 TOTAL SCORE: 15  Patient Health Questionnaire (G7) (Submitted on 12/17/2024)  YUDI 7 TOTAL SCORE: 12    Answers submitted by the patient for this visit:  Patient Health Questionnaire (Submitted on 1/14/2025)  If you checked off any problems, how difficult have these problems made it for you to do your work, take care of things at home, or get along with other people?: Somewhat difficult  PHQ9 TOTAL SCORE: 10    [facility] Safety Plan      Creation Date: 10/21/24       Step 1: Warning signs:    Warning Signs    Start thinking about death more      Step 2: Internal coping strategies - Things I can do to take my mind off my problems without contacting another person:    Strategies    Petting cats    Watching music videos    talk to family member    text friend Tono    hot bubble bath      Step 3: People and social settings that provide distraction:    Name Contact Information    Tono - friend [phone]    Mother [phone]         Step 4: People whom I can ask for help during a crisis:    Name Contact Information    Keyana - sister [phone]      Step 5: Professionals or agencies I can contact during a crisis:    Clinician/Agency Name Phone Emergency Contact    Shirley Bartlett [phone]    ANU CHENEY        [facility] Emergency Department Emergency Department Address Emergency Department Phone    [facility]       Suicide Prevention Lifeline Phone: Call or Text 694  Crisis Text Line: Text HOME to 297395     Step 6: Making the environment safer (plan for lethal means safety):   Previous medications - dispose of unused or older medications     Optional: What is most important to me and worth living for?:   Niece  Mother  Sister  Cats     [facility] Safety Plan. Shanita  Neno and Ariel Lorenzo. Used with permission of the authors.

## 2025-03-13 DIAGNOSIS — M79.18 MYOFASCIAL MUSCLE PAIN: ICD-10-CM

## 2025-03-13 DIAGNOSIS — G89.29 CHRONIC INTRACTABLE PAIN: ICD-10-CM

## 2025-03-13 RX ORDER — NABUMETONE 500 MG/1
TABLET, FILM COATED ORAL
Qty: 120 TABLET | Refills: 0 | Status: SHIPPED | OUTPATIENT
Start: 2025-03-13

## 2025-03-15 DIAGNOSIS — M51.362 DEGENERATION OF INTERVERTEBRAL DISC OF LUMBAR REGION WITH DISCOGENIC BACK PAIN AND LOWER EXTREMITY PAIN: ICD-10-CM

## 2025-03-17 ENCOUNTER — VIRTUAL VISIT (OUTPATIENT)
Dept: PHARMACY | Facility: CLINIC | Age: 50
End: 2025-03-17
Attending: INTERNAL MEDICINE
Payer: MEDICARE

## 2025-03-17 DIAGNOSIS — E11.9 TYPE 2 DIABETES MELLITUS WITHOUT COMPLICATION, WITHOUT LONG-TERM CURRENT USE OF INSULIN (H): Primary | ICD-10-CM

## 2025-03-17 DIAGNOSIS — G43.819 OTHER MIGRAINE WITHOUT STATUS MIGRAINOSUS, INTRACTABLE: ICD-10-CM

## 2025-03-17 RX ORDER — METHOCARBAMOL 500 MG/1
TABLET, FILM COATED ORAL
Qty: 240 TABLET | Refills: 0 | Status: SHIPPED | OUTPATIENT
Start: 2025-03-17

## 2025-03-17 RX ORDER — LURASIDONE HYDROCHLORIDE 20 MG/1
20 TABLET, FILM COATED ORAL DAILY
COMMUNITY

## 2025-03-17 NOTE — PROGRESS NOTES
Medication Therapy Management (MTM) Encounter    ASSESSMENT:                            Medication Adherence/Access: No issues identified.    Diabetes: Per patient reports they have been better able to tolerate Mounjaro overall as they believe that migraines are not related to Mounjaro thus plan in place for Mounjaro dose increase to help with overall continued blood sugar control and provide additional benefits of weight loss.      Migraines: Plan in place for patient to start on injectable Amovig to help with migraine prevention, continues to work with Melvin Samuel APRN CNP at the [facility] for ongoing management.     I was only able to assess the above with the patient today. Patient recently met MTM pharmacist Daja Mcdonald to review medications and will continue to follow-up on other disease states and medication review with Daja at upcoming visit.     PLAN:                            Increase Mounjaro to 5mg weekly after finishing all 2.5mg doses     Follow-up: 4/15/25 at 1 PM    Endocrine Team & Next Follow-Up:  4/15/25 with Traci  7/7/25 with Dr. Cuenca    SUBJECTIVE/OBJECTIVE:                          Tito Pineda is a 49 year old male called for a follow-up visit.       Reason for visit: Mounjaro follow up.    Allergies/ADRs: Reviewed in chart  Past Medical History: Reviewed in chart  Tobacco: He reports that he has never smoked. He has never been exposed to tobacco smoke. He has never used smokeless tobacco.  Alcohol: Reviewed in chart     Medication Adherence/Access: no issues reported.    Diabetes   Patient diagnosed with type 2 diabetes   Current Medications:  Mounjaro 2.5mg weekly on Thursdays - patient has been on current dose for almost 3 months, notes that they continue to have migraines however he notes that this is likely more linked to something else as migraines are usually triggered by light (see migraine section below). Continues to have nausea symptoms off and on but  less since last MTM visit, continues to use metoclopramide to help with symptoms and notes that he has been using 5mg usually twice weekly since last MTM visit. Declines any worsening in gastroparesis symptoms, notes that he is open to a dose change today. Patient notes that weight remains stable around 293 lbs.   Medication History:  - Ozempic: rash occurring on face with higher doses, replaced by Mounjaro     Migraines:   Current Medications:  Divalproex 250mg twice daily - patient notes that they have recently increased dose and not found this to be beneficial to help with migraines   Ubrelvy 100mg daily as needed   Riboflavin 400mg daily   Magnesium glycinate 360mg daily   Patient notes that he is working with neurology to change to Amovig for migraine prevention, notes that order was just sent in today   Patient notes that he has recently increased use of oxycodone for chronic pain and believes that increased use my be related to causing medication overuse headaches   Follows with Melvin Samuel APRN CNP at the [facility] for ongoing management     Today's Vitals: There were no vitals taken for this visit.  ----------------    I spent 22 minutes with this patient today. All changes were made via collaborative practice agreement with Abbie Cuenca.     A summary of these recommendations was sent via Raydiance.    Traci Méndez), PharmD  Endocrine & Diabetes Medication Therapy Management Pharmacist   [address] [place] [id number]     Contact information:   To schedule a MTM appointment: [phone]  For questions or concerns, please send a Raydiance message or call the clinic at [phone].    For more urgent concerns that do not require 897, please call [phone] after hours/weekends and ask to speak with the Endocrinologist on call.      Telemedicine Visit Details  The patient's medications can be safely assessed via a telemedicine encounter.  Type of service:   Telephone visit  Originating Location (pt. Location): Home    Distant Location (provider location):  On-site  Start Time:  1:07 PM  End Time: 1:29 PM     Medication Therapy Recommendations  Type 2 diabetes mellitus with complication, without long-term current use of insulin (H)   1 Current Medication: MOUNJARO 2.5 MG/0.5ML SOAJ (Discontinued)   Current Medication Sig: Inject 0.5 mLs (2.5 mg) subcutaneously every 7 days.   Rationale: Dose too low - Dosage too low - Effectiveness   Recommendation: Increase Dose   Status: Accepted per CPA   Identified Date: 3/17/2025 Completed Date: 3/18/2025

## 2025-03-18 NOTE — PATIENT INSTRUCTIONS
"Recommendations from today's MTM visit:                                                      Increase Mounjaro to 5mg weekly after finishing all 2.5mg doses     Follow-up: 4/15/25 at 1 PM    Endocrine Team & Next Follow-Up:  4/15/25 with Traci  7/7/25 with Dr. Cuenca    It was great speaking with you today.  I value your experience and would be very thankful for your time in providing feedback in our clinic survey. In the next few days, you may receive an email or text message from [employer] with a link to a survey related to your  clinical pharmacist.\"     To schedule another MTM appointment, please call the clinic directly or you may call the MTM scheduling line at [phone].    My Clinical Pharmacist's contact information:                                                      Please feel free to contact me with any questions or concerns you have.      Traci Méndez), PharmD  Endocrine & Diabetes Medication Therapy Management Pharmacist   [address] [place] [id number]     Contact information:   To schedule a MTM appointment: [phone]  For questions or concerns, please send a inSelly message or call the clinic at [phone].    For more urgent concerns that do not require 782, please call [phone] after hours/weekends and ask to speak with the Endocrinologist on call.     "

## 2025-03-22 ASSESSMENT — PAIN SCALES - PAIN ENJOYMENT GENERAL ACTIVITY SCALE (PEG)
AVG_PAIN_PASTWEEK: 4
PEG_TOTALSCORE: 2.67
INTERFERED_GENERAL_ACTIVITY: 2
INTERFERED_ENJOYMENT_LIFE: 2

## 2025-03-24 ENCOUNTER — VIRTUAL VISIT (OUTPATIENT)
Dept: FAMILY MEDICINE | Facility: CLINIC | Age: 50
End: 2025-03-24
Payer: MEDICARE

## 2025-03-24 ENCOUNTER — MYC MEDICAL ADVICE (OUTPATIENT)
Dept: FAMILY MEDICINE | Facility: CLINIC | Age: 50
End: 2025-03-24

## 2025-03-24 ENCOUNTER — TELEPHONE (OUTPATIENT)
Dept: FAMILY MEDICINE | Facility: CLINIC | Age: 50
End: 2025-03-24

## 2025-03-24 DIAGNOSIS — M45.8 ANKYLOSING SPONDYLITIS OF SACRAL REGION (H): ICD-10-CM

## 2025-03-24 DIAGNOSIS — B02.9 HERPES ZOSTER WITHOUT COMPLICATION: Primary | ICD-10-CM

## 2025-03-24 PROCEDURE — 98006 SYNCH AUDIO-VIDEO EST MOD 30: CPT | Performed by: INTERNAL MEDICINE

## 2025-03-24 ASSESSMENT — ASTHMA QUESTIONNAIRES
EMERGENCY_ROOM_LAST_YEAR_TOTAL: ONE
QUESTION_3 LAST FOUR WEEKS HOW OFTEN DID YOUR ASTHMA SYMPTOMS (WHEEZING, COUGHING, SHORTNESS OF BREATH, CHEST TIGHTNESS OR PAIN) WAKE YOU UP AT NIGHT OR EARLIER THAN USUAL IN THE MORNING: NOT AT ALL
QUESTION_1 LAST FOUR WEEKS HOW MUCH OF THE TIME DID YOUR ASTHMA KEEP YOU FROM GETTING AS MUCH DONE AT WORK, SCHOOL OR AT HOME: A LITTLE OF THE TIME
QUESTION_5 LAST FOUR WEEKS HOW WOULD YOU RATE YOUR ASTHMA CONTROL: WELL CONTROLLED
QUESTION_2 LAST FOUR WEEKS HOW OFTEN HAVE YOU HAD SHORTNESS OF BREATH: ONCE OR TWICE A WEEK
ACT_TOTALSCORE: 20
QUESTION_4 LAST FOUR WEEKS HOW OFTEN HAVE YOU USED YOUR RESCUE INHALER OR NEBULIZER MEDICATION (SUCH AS ALBUTEROL): TWO OR THREE TIMES PER WEEK

## 2025-03-24 NOTE — TELEPHONE ENCOUNTER
Patient calling in today and had submitted an E-Visit to PCP and given doxycycline for what was thought to be cysts in the scalp. Patient now has another rash and is prone to getting shingles. Patient has a new, painful, rash starting on the same side of rash on scalp. Patient has started the doxycycline on 3/21/25 for a 7 day treatment course.     Set up a virtual visit with provider today, for evaluation of possible shingles. Patient will attempt sending pictures through [employer] to send to Dr. Knapp.    Future Appointments 3/24/2025 - 9/20/2025        Date Visit Type Length Department Provider     3/24/2025  3:30 PM OFFICE VISIT 30 min BA LOGAN/IM/Kanu Thurman MD    Location Instructions:     [facility] is located at [address] in [place]. This is just west of the [facility] exit off of [address] [address]. Free parking is available; from [facility], access the lot by turning onto [address] or [address].               3/26/2025 11:00 AM ADULT PSYCHOTHERAPY RETURN 60 min OX [facility] Shirley Bartlett PsyD              3/27/2025  9:00 AM RETURN PAIN 30 min [facility] Stacia Jack APRN CNP    Location Instructions:     This appointment is in a hospital-based location.&nbsp; Before your visit, you may want to check with your insurance company for coverage and referral options, including cost differences between services provided in different clinic settings.&nbsp; For more information visit this link on the [employer] Website:&nbsp; [email or web address]              4/9/2025 11:00 AM ADULT PSYCHOTHERAPY RETURN 60 min OX [facility]irley Soto PsyD              4/15/2025  1:00 PM MTM RETURN 30 min  MT[facility] MULTI SPECIALTY Robles Ballard RPH    Location Instructions:     [facility] [place] area with multiple transportation and parking options. You may wish to review options for  service and  self-parking in more detail on the [facility] s website at [email or web address].   This appointment is in a hospital-based location.&nbsp; Before your visit, you may want to check with your insurance company for coverage and referral options, including cost differences between services provided in different clinic settings.&nbsp; For more information visit this link on the [employer] Website:&nbsp; [email or web address]              4/16/2025  2:00 PM ADULT PSYCHOTHERAPY RETURN 60 min OX [facility] Tri, Shirley B, PsyD              4/23/2025  2:00 PM ADULT PSYCHOTHERAPY RETURN 60 min OX [facility] Tri, Shirley B, PsyD              4/30/2025  2:00 PM ADULT PSYCHOTHERAPY RETURN 60 min OX [facility] Tri, Shirley B, PsyD              5/6/2025  2:00 PM MTM RETURN 30 min MG MTM Radha Mcdonald, formerly [facility]              5/22/2025  9:00 AM RETURN PAIN 30 min [facility] Stacia Jack APRN CNP    Location Instructions:     This appointment is in a hospital-based location.&nbsp; Before your visit, you may want to check with your insurance company for coverage and referral options, including cost differences between services provided in different clinic settings.&nbsp; For more information visit this link on the [employer] Website:&nbsp; [email or web address]              6/3/2025 10:30 AM RETURN ADULT EYE 15 min MG Rian Allen MD              6/5/2025 10:45 AM RETURN ENT 15 min [facility] Josefina Garcia MD    Location Instructions:     The [facility] ([facility]) is in a dense urban area with multiple transportation and parking options. You may wish to review options for  service and self-parking in more detail on the [facility] website at [email or web address].              6/5/2025  2:00 PM RETURN SLP VOICE 60 min [facility] ENT DYSPHONIA Chico Acevedo SLP    Location Instructions:     The [facility] ([facility] is in a dense urban area with multiple  transportation and parking options. You may wish to review options for  service and self-parking in more detail on the [facility] s website at [email or web address].              6/11/2025 11:00 AM RETURN ASTHMA 30 min MG Sue Hurley MD              7/7/2025 12:40 PM RETURN DIABETES 20 min MG ENDO MG ENDO NURSE     7/7/2025  1:00 PM RETURN DIABETES 30 min MG ENDO Abbie Cuenca MD              7/14/2025  2:00 PM RETURN RBD 30 min  SLEEP CENTER Joel Damico MD    Location Instructions:     Sleep Clinic Appointments: Park in the East parking ramp near the Emergency Room and enter the Physicians Building on Level B, the lowest level. Check in with the  in [address]. &nbsp;  Sleep Study Appointments: Park in the East parking ramp near the Emergency Room and enter the Physicians Building on Level B, the lowest level. Check in with the  in [address].                       Rosangela Dixon RN

## 2025-03-24 NOTE — PROGRESS NOTES
Tito is a 49 year old who is being evaluated via a billable video visit.          Assessment & Plan     Herpes zoster without complication  He has a history of shingles in the past  He is Valtrex suppressive therapy on a long-term basis  Last week he started short acting the rash on the scalp  There were small raised red bumps  He  had a virtual visit Dr. Lyles  They thought it was folliculitis he was placed on doxycycline  He had a second lesion now  He tells me it is close to midline  He thinks that this could be potentially shingles  He did send some pictures  I reviewed them  They do not appear to be vesicles   just looking at the pictures it is difficult to say if this is in fact shingles  He only has 2 lesions  They does not appear to be vesicles  I discussed this with them  Explained to him that if his symptoms are similar to the symptoms he had in the past when he had shingles he can certainly increase the current Valtrex dose of 1 g daily to 3 g 3 times daily for 1 week and I have no problems with that  He is immunocompromised and for this reason I do not think it is unreasonable to treat this as if it is shingles although the lesions does not look like classical shingles by the photos  I do understand that he also had some chills and bodyaches and he was thinking if these were prodromal to the shingles    Ankylosing spondylitis of sacral region (H)  He  follows up with rheumatology and is taking Enbrel                Subjective   Tito is a 49 year old, presenting for the following health issues:  No chief complaint on file.      Video Start Time:  320 am     History of Present Illness       Reason for visit:  Likely shingles infection  Symptom onset:  3-7 days ago  Symptoms include:  Nodules (large red [place] with small pus center), burning pain  Symptom intensity:  Moderate  Symptom progression:  Worsening  Had these symptoms before:  Yes  Has tried/received treatment for these symptoms:   Yes  Previous treatment was successful:  Yes  Prior treatment description:  Valtrex 1000mg TID  What makes it better:  Topical lidocaine (which I have)   He is taking medications regularly.      Here to discuss about possible shingles          Review of Systems  Constitutional, HEENT, cardiovascular, pulmonary, gi and gu systems are negative, except as otherwise noted.      Objective           Vitals:  No vitals were obtained today due to virtual visit.    Physical Exam   GENERAL: alert and no distress  EYES: Eyes grossly normal to inspection.  No discharge or erythema, or obvious scleral/conjunctival abnormalities.  RESP: No audible wheeze, cough, or visible cyanosis.    SKIN: Visible skin clear. No significant rash, abnormal pigmentation or lesions.  NEURO: Cranial nerves grossly intact.  Mentation and speech appropriate for age.  PSYCH: Appropriate affect, tone, and pace of words          Video-Visit Details    Type of service:  Video Visit   Video End Time: 330 pm  Originating Location (pt. Location): Home    Distant Location (provider location):  Off-site  Platform used for Video Visit: Sophia  Signed Electronically by: Kanu Knapp MD

## 2025-03-26 ENCOUNTER — TELEPHONE (OUTPATIENT)
Dept: OTOLARYNGOLOGY | Facility: CLINIC | Age: 50
End: 2025-03-26
Payer: MEDICARE

## 2025-03-26 ENCOUNTER — VIRTUAL VISIT (OUTPATIENT)
Facility: CLINIC | Age: 50
End: 2025-03-26
Payer: MEDICARE

## 2025-03-26 DIAGNOSIS — F33.1 MAJOR DEPRESSIVE DISORDER, RECURRENT EPISODE, MODERATE (H): Primary | ICD-10-CM

## 2025-03-26 DIAGNOSIS — F41.1 GENERALIZED ANXIETY DISORDER: ICD-10-CM

## 2025-03-26 PROCEDURE — 90834 PSYTX W PT 45 MINUTES: CPT | Mod: 95 | Performed by: PSYCHOLOGIST

## 2025-03-26 NOTE — PROGRESS NOTES
[facility]                                     Progress Note    Patient Name: Joel Pineda  Date: March 26, 2025              Service Type: Individual      Session Start Time: 11:00 AM Session End Time: 11:47 AM     Session Length: 47 minutes    Session #: 17    Attendees: Client attended alone    Service Modality:  Video Visit:      Provider verified identity through the following two step process.  Patient provided:  Patient photo and Patient is known previously to provider    Telemedicine Visit: The patient's condition can be safely assessed and treated via synchronous audio and visual telemedicine encounter.      Reason for Telemedicine Visit: Patient convenience (e.g. access to timely appointments / distance to available provider)    Originating Site (Patient Location): Patient's other parked car at [facility] parking ramp    Distant Site (Provider Location): Provider Remote Setting- Home Office    Consent:  The patient/guardian has verbally consented to: the potential risks and benefits of telemedicine (video visit) versus in person care; bill my insurance or make self-payment for services provided; and responsibility for payment of non-covered services.     Patient would like the video invitation sent by:  Text to cell phone: [phone]    Mode of Communication:  Video Conference via [employer]    Distant Location (Provider):  Off-site    As the provider I attest to compliance with applicable laws and regulations related to telemedicine.    DATA  Interactive Complexity: No   Crisis: No        Progress Since Last Session (Related to Symptoms / Goals / Homework):   Symptoms: No change Tito continues to report low mood, ongoing anxiety and panic.    Homework: Achieved / completed to satisfaction - using DBT skills for emotion regulation.      Episode of Care Goals: Satisfactory progress - CONTEMPLATION (Considering change and yet undecided); Intervened by assessing the negative and  positive thinking (ambivalence) about behavior change     Current / Ongoing Stressors and Concerns:   Tito cancelled last week's visit due to mother's pace-maker surgery conflicted with scheduled visit. Some minor pushback from family about mother's care after she displayed new stroke symptoms. She will be in acute rehab once discharged, which Tito feels very relieved about. He has had a return of shingles, admits to missing his preventative medications which he had been forgetting to take due to being at the hospital. His neuro PT believes his increased walking has reset his vestibular system. He continues to take a day off from seeing mother, discussed how his system is likely on hyperalert due to heightened stress level. We discussed importance of engaging in self-care given this heightened stress. Reviewed TIP skill - he has been engaging in using temperature regularly, and realized he has been using intense physical activity. Physical activity seems to have been very beneficial for Tito's overall physical and mental health; explored strategies to continue this once mother is discharged back home. Had to set very clear limit with  who pushes his buttons, used Chat GPT to help with this. He did have to block 's phone number, however she has since settled into her role as Board . He reports general absence of passive suicidal thoughts since our last visit, having had an 'aha' moment that his mother is 'fighting for 5 more years of her life'.       Treatment Objective(s) Addressed in This Session:   use cognitive strategies identified in therapy to challenge anxious thoughts  state understanding of stressors and relationship to physical symptoms  Decrease frequency and intensity of feeling down, depressed, hopeless  Identify negative self-talk and behaviors: challenge core beliefs, myths, and actions  reduce their emotional vulnerability by 40% during the Emotion Regulation  Module (6-8 weeks)      Intervention:      CBT: cognitive challenging, restructuring, reframing Behavioral activation techniques, grounding strategies  PCT: supportive autonomy, unconditional positive regard, empathic reflection, active listening  DBT: self-soothing with the 5 senses, TIP Skill, IMPROVE the moment    Assessments completed prior to visit:  The following assessments were completed by patient for this visit:  The following assessments were completed by patient for this visit:  PHQ9:       12/17/2024     2:51 PM 1/1/2025     3:48 PM 1/14/2025     9:37 AM 1/28/2025     8:32 AM 2/18/2025    10:29 AM 3/4/2025     2:20 PM 3/12/2025    11:35 AM   PHQ-9 SCORE   PHQ-9 Total Score MyChart 15 (Moderately severe depression) 8 (Mild depression) 10 (Moderate depression) 10 (Moderate depression) 10 (Moderate depression) 6 (Mild depression) 8 (Mild depression)   PHQ-9 Total Score 15  8  10  10  10  6  8        Patient-reported     GAD7:       8/1/2024     9:28 AM 10/20/2024     2:25 PM 12/3/2024    12:14 PM 12/17/2024     2:51 PM 1/1/2025     3:50 PM 2/18/2025     2:18 PM 3/12/2025    11:39 AM   YUDI-7 SCORE   Total Score 7 (mild anxiety) 7 (mild anxiety) 9 (mild anxiety) 12 (moderate anxiety) 8 (mild anxiety) 10 (moderate anxiety) 15 (severe anxiety)   Total Score 7 7 9  12  8  10  15        Patient-reported         ASSESSMENT: Current Emotional / Mental Status (status of significant symptoms):   Risk status (Self / Other harm or suicidal ideation)   Patient reports the following current fears or concerns for personal safety: Ongoing passive thoughts of suicide with absence of plan or intent and agreed to follow his previously developed [employer] Safety Plan.   Patient denies current or recent suicidal ideation or behaviors.   Patient denies current or recent homicidal ideation or behaviors.   Patient denies current or recent self injurious behavior or ideation.   Patient denies other safety concerns.   Patient  reports there has been no change in risk factors since their last session.     Patient reports there has been no change in protective factors since their last session.     A safety and risk management plan has been developed including: Patient consented to co-developed safety plan on 10/21/2024.  Safety and risk management plan was reviewed.   Patient agreed to use safety plan should any safety concerns arise.  A copy was made available to the patient.     Appearance:   Appropriate    Eye Contact:   Fair    Psychomotor Behavior: Normal    Attitude:   Cooperative    Orientation:   All   Speech    Rate / Production: Impoverished     Volume:  Normal    Mood:    Anxious  Depressed  Irritable  Sad    Affect:    Subdued  Worrisome    Thought Content:  Clear    Thought Form:  Tangential    Insight:    Fair      Medication Review:   Changes to psychiatric medications, see updated Medication List in EPIC.       Medication Compliance:   Yes - may not take some PRNs as often as he could     Changes in Health Issues:   None reported     Chemical Use Review:   Substance Use: Chemical use reviewed, no active concerns identified      Tobacco Use: No current tobacco use.      Diagnosis:  296.32 (F33.1) Major Depressive Disorder, Recurrent Episode, Moderate _.  300.02 (F41.1) Generalized Anxiety Disorder.    Collateral Reports Completed:   Not Applicable    PLAN: (Patient Tasks / Therapist Tasks / Other)    Patient to continue to use non-judgmental stance and cope ahead skills.     Patient and provider will continue practicing how to reframe automatic thinking from negative situations.     Patient will practice radical self-compassion techniques to decrease shame.     Patient to engage in basic self-cares and focus on present moment.     Shirley Bartlett PsyD LP                                                      ______________________________________________________________________    Individual Treatment Plan    Patient's Name:  Joel Pineda  YOB: 1975    Date of Creation: October 30, 2024   Date Treatment Plan Last Reviewed/Revised: January 29, 2025      DSM5 Diagnoses: 296.32 (F33.1) Major Depressive Disorder, Recurrent Episode, Moderate _ or 300.02 (F41.1) Generalized Anxiety Disorder  Psychosocial / Contextual Factors: recent surgery and resulting acute pain, chronic pain, chronic mental health concerns, mobility limitations  PROMIS (reviewed every 90 days):   PROMIS-10 Scores        1/15/2025    12:04 PM 2/11/2025     2:54 PM 2/18/2025     2:19 PM   PROMIS-10 Total Score w/o Sub Scores   PROMIS TOTAL - SUBSCORES 21  21  18        Patient-reported        Referral / Collaboration:  Referral to another professional/service is not indicated at this time..    Anticipated number of session for this episode of care: 9-12 sessions  Anticipation frequency of session: Weekly  Anticipated Duration of each session: 53 or more minutes  Treatment plan will be reviewed in 90 days or when goals have been changed.     MeasurableTreatment Goal(s) related to diagnosis / functional impairment(s)  Goal 1: Patient will decrease anxiety by 75% as evidenced by YUDI-7    I will know I've met my goal when I am at peace with where I'm at.       Objective #A (Patient Action)                          Patient will identify the situations / thoughts that contribute to feeling anxious.  Status: Continued - Date(s):1/29/2025       Intervention(s)  Therapist will process anxiety-proving emotions.     Objective #B  Patient will use at least 3 coping skills for anxiety management in the next 4 weeks.  Status: Continued - Date(s):1/29/2025        Intervention(s)  Therapist will assign homework : coping skills practice to decrease anxiety .     Objective #C  Patient will use cognitive strategies identified in therapy to challenge anxious thoughts.  Status: Continued - Date(s):1/29/2025        Intervention(s)  Therapist will  teach the patient ways to reframe  negative core beliefs that contribute to anxiety.     Goal 2 Patient will report pain levels are consistently rated at 2/10 per patient report.   I will know I've met my goal when my pain is less disruptive.      Objective #A (Patient Action)    Patient will identify 5 strategies for managing pain.  Status: Continued - Date(s):1/29/2025     Intervention(s)  Therapist will teach  strategies to manage pain and assign homework to use 3 strategies daily .    Objective #B  Patient will  engage in physical activity and PT exercises 2-3 times daily .  Status: Continued - Date(s):1/29/2025      Intervention(s)  Therapist will assign homework to engage in at minimum one PT or physical activity daily .    Objective #C  Patient will Identify negative self-talk and behaviors: challenge core beliefs, myths, and actions.  Status: Continued - Date(s):1/29/2025      Intervention(s)  Therapist will teach non-judgmental stance and help patient reframe negative self-talk  .      Goal 3: Patient will report reduction in depressive symptoms as evidenced by PHQ-9 score reduction of 75% or greater.     I will know I've met my goal when I feel like I have purpose in life.      Objective #A (Patient Action)    Status: Continued - Date(s):1/29/2025      Patient will Decrease frequency and intensity of feeling down, depressed, hopeless.    Intervention(s)  Therapist will teach emotional recognition/identification. Therapist will reinforce use of emotion regulation skills .    Objective #B  Patient will use healthy communication when describing his emotions or when setting boundaries with others.     Status: Continued - Date(s):1/29/2025      Intervention(s)  Therapist will teach Therapist will teach emotional regulation skills and interpersonal effectiveness skills to improve quality of communication.     Objective #C  Patient will track and record at least 3 pleasant exchanges with family members such as mother, father .  Status: Continued -  Date(s):1/29/2025     Intervention(s)  Therapist will teach about healthy boundaries. Therapist will encourage patient to focus on positive interactions with family and use cope ahead to increase likelihood of pleasant experience as an outcome .    Patient has reviewed and agreed to the above plan.      Shirley Bartlett PsyD LP  January 29, 2025    Answers submitted by the patient for this visit:  Patient Health Questionnaire (Submitted on 12/3/2024)  If you checked off any problems, how difficult have these problems made it for you to do your work, take care of things at home, or get along with other people?: Very difficult  PHQ9 TOTAL SCORE: 10  Patient Health Questionnaire (G7) (Submitted on 12/3/2024)  YUDI 7 TOTAL SCORE: 9    Answers submitted by the patient for this visit:  Patient Health Questionnaire (Submitted on 12/17/2024)  If you checked off any problems, how difficult have these problems made it for you to do your work, take care of things at home, or get along with other people?: Very difficult  PHQ9 TOTAL SCORE: 15  Patient Health Questionnaire (G7) (Submitted on 12/17/2024)  YUDI 7 TOTAL SCORE: 12    Answers submitted by the patient for this visit:  Patient Health Questionnaire (Submitted on 1/14/2025)  If you checked off any problems, how difficult have these problems made it for you to do your work, take care of things at home, or get along with other people?: Somewhat difficult  PHQ9 TOTAL SCORE: 10    [facility] Safety Plan      Creation Date: 10/21/24       Step 1: Warning signs:    Warning Signs    Start thinking about death more      Step 2: Internal coping strategies - Things I can do to take my mind off my problems without contacting another person:    Strategies    Petting cats    Watching music videos    talk to family member    text friend Tono    hot bubble bath      Step 3: People and social settings that provide distraction:    Name Contact Information    Tono - friend [phone]     Mother [phone]         Step 4: People whom I can ask for help during a crisis:    Name Contact Information    Keyana - sister [phone]      Step 5: Professionals or agencies I can contact during a crisis:    Clinician/Agency Name Phone Emergency Contact    Shirley Bartlett [phone]    ANU CHENEY        Local Emergency Department Emergency Department Address Emergency Department Phone    [facility]       Suicide Prevention Lifeline Phone: Call or Text 072  Crisis Text Line: Text HOME to 575832     Step 6: Making the environment safer (plan for lethal means safety):   Previous medications - dispose of unused or older medications     Optional: What is most important to me and worth living for?:   Niece  Mother  Sister  Cats     [place] Safety Plan. Shanita Lazcano and Ariel Lorenzo. Used with permission of the authors.

## 2025-03-26 NOTE — TELEPHONE ENCOUNTER
Lvm with patient confirming okay to cancel upcoming appts with Tono if patient feels everything has been addressed with [facility]. Gave call center number to cancel June appt as well if interested, as patient had already cancelled April appts.  Dary Santiago on 3/26/2025 at 8:00 AM

## 2025-03-27 ENCOUNTER — VIRTUAL VISIT (OUTPATIENT)
Dept: PALLIATIVE MEDICINE | Facility: OTHER | Age: 50
End: 2025-03-27
Attending: NURSE PRACTITIONER
Payer: MEDICARE

## 2025-03-27 DIAGNOSIS — M45.8 ANKYLOSING SPONDYLITIS OF SACRAL REGION (H): ICD-10-CM

## 2025-03-27 DIAGNOSIS — G57.13 MERALGIA PARESTHETICA OF BOTH LOWER EXTREMITIES: ICD-10-CM

## 2025-03-27 DIAGNOSIS — G89.29 CHRONIC INTRACTABLE PAIN: Primary | ICD-10-CM

## 2025-03-27 DIAGNOSIS — E08.42 DIABETIC POLYNEUROPATHY ASSOCIATED WITH DIABETES MELLITUS DUE TO UNDERLYING CONDITION (H): ICD-10-CM

## 2025-03-27 DIAGNOSIS — G43.111 INTRACTABLE MIGRAINE WITH AURA WITH STATUS MIGRAINOSUS: ICD-10-CM

## 2025-03-27 DIAGNOSIS — G62.9 PERIPHERAL POLYNEUROPATHY: ICD-10-CM

## 2025-03-27 RX ORDER — MULTIVITAMIN
1 TABLET ORAL DAILY
COMMUNITY

## 2025-03-27 ASSESSMENT — PAIN SCALES - GENERAL: PAINLEVEL_OUTOF10: MODERATE PAIN (4)

## 2025-03-27 NOTE — PROGRESS NOTES
[facility]      Joel Pineda is a 49 year old male who is being evaluated via a billable virtual visit.      VIDEO VISIT   How would you like to obtain your AVS? MyChart  If you are dropped from the video visit, the video invite should be resent to: Text to cell phone: [phone]  Will anyone else be joining your video visit? NO,  Is patient CURRENTLY in [place]? YES  If patient encounters technical issues they should call [phone]    Video-Visit Details  Type of service:  Video Visit  Video Start Time: 9:04 AM  Video End Time: 9:24 AM  Total Face to Face Time: 20 minutes   Originating Location (pt. Location): Home  Distant Location (provider location):  [facility]   Platform used for Video Visit: [facility]      CHIEF COMPLAINT: Chronic pain    INTERVAL HISTORY:  Last seen on 1/27/25.        Recommendations/plan at the last visit included:  30 minutes Video or Clinic follow-up with JOCELYN Zavala, NP-C in 3 months or sooner as needed   Physical Therapy order provided.   Labs: Annual requirements   Medication Management :   Narcan refilled    Since last visit:   - His mom has been in ICU following a cardiac procedure. Complications but starting to recover, will be transferring to rehab hopefully soon. His back has been holding up than he thinks it would have prior to surgery. Currently doing PT, balance therapy, vocal therapy. Had a barium swallow study and found out why swallowing was a problem. Has learned some techniques to correct. Good family support for his mom and for Tito.  - 12/9/24: Nerve Block with Dr Montgomery: Very effective for last 4 months, 60+% pain reduction.   - Migraines are variable, just started Emgality. Will not be doing Botox for now.   - Continues with Dr Bartlett in psychology usually weekly and psychiatry monthly     Pain Information Today: Score: Moderate Pain (4)/10. Location of pain: multifocal pain     Annual  Requirements last collected: January 27, 2025      Current Pain Relevant Medications:    Acetaminophen 500 mg BID & with Oxycodone.   Cymbalta 120 mg in AM  Lyrica 300 MG BID   Methocarbamol 500 mg 1-2 QID PRN  Nabumetone 500 mg 1-2 times a day  Lidocaine gel topically 2-3 times daily  Risperdal 0.5 mg 1-2 x daily      Pain Related Controlled Substances:   Clonazepam 0.5 mg, 2 tabs at HS.  Lunesta 3 mg at HS   Oxycodone 5 mg 1-2 tabs per day PRN              Total opiate dose: 7.5-15 MME     Previous Pain Relevant Medications: (H--helped; HI--Helped initially; [facility]--Somewhat helpful; NH--No help; W--worse; SE--side effects; ?--Unsure if helpful)   NOTE: This medication information taken from patient's intake form, not medical records.   Opiates: Oxycodone: H, Tramadol:[facility]. SE, Codeine: NH  NSAIDS: Celebrex: [facility], Nabumetone:H, Naproxen: SE  Anti-migraine medications: Midrin? , Maxalt:H  Muscle Relaxants: Baclofen:[facility], Flexeril:H, Tizaidine:?, Methocarbamol:?  Neuropathics: Lyrica:H  Anti-depressants: Abilify:SE, Brintellix: NH, Wellbutrin: ?, Cymbalta: NH, Nortriptyline: NH, Seroquel:   [facility], Risperdal: NH, Effexor:?, Cymbalta ?  Anxiety medications: Xanax: H, Klonpin: H, lorazepam: H      Topicals: Lidocaine:H  Sleep medications:Belsomra: NH, Melatonin:H, Trazodone NH, Ambien:NH  Other medications not covered above: Humira: All, Enbrel; H, dicyclomine:H, Medrol:[facility], Prednisone:H     Any illicit drug use: denies   EtOH use: none   Caffeine use: 6-8 per day   Nicotine use: None     Past Pain Treatments:   Pain Clinic:   Yes  [facility] pain management: For spinal injections with Dr Diaz, [facility]: injections, nerve ablation, [facility] for SCS treatment.  Did trial but did not find it beneficial.   [facility] chronic pain program.    PT: Yes  For other reasons. Neck, back and feet  Psychologist: Yes ongoing with private therapist.at  [facility].   Chiropractor: Yes years ag              Acupuncture: Yes NH  Pharmacotherapy:   Opioids: Yes  Non-opioids:    Yes   TENs Unit:Yes H for back   Injections: Yes Many for neck and back over the years.      Surgeries related to pain: Yes   October 2007 L5-S1 laminectomy, micro discectomy          THE 4 As OF OPIOID MAINTENANCE ANALGESIA    Analgesia: Is pain relief clinically significant? YES   Activity: Is patient functional and able to perform Activities of Daily Living? YES   Adverse effects: Is patient free from adverse side effects from opiates? YES   Adherence to Rx protocol: Is patient adhering to Controlled Substance Agreement and taking medications ONLY as ordered? YES     Is Narcan prescribed for opiate use >50 MME daily or concurrent use of opiates and benzodiazepines?  Yes    [place] Board of Pharmacy Data Base Reviewed:    YES; As expected, no concern for misuse/abuse of controlled medications based on this report. Reviewed [facility] March 26, 2025- no concerning fills.    _______________________________________________      Physical Exam and Vital Signs not completed due to virtual visit.     General: Verbal, alert and no distress   Psychiatric:  affect and mood normal, mentation appears normal.     DIRE Score for ongoing opioid management is calculated as follows:    Diagnosis = 3 pts (advanced condition; severe pain/objective findings)    Intractability = 3 pts (patient fully engaged but inadequate response to treatments)    Risk        Psych = 3 pts (no significant personality dysfunction/mental illness; good communication with clinic)         Chem Hlth = 3 pts (no history of chemical dependency; not drug-focused)       Reliability = 2 pts (occasional difficulties with compliance; generally reliable)       Social = 2 pts (reduction in some relationships/life rolls)       (Psych + Chem hlth + Reliability + Social) = 16    Efficacy = 2 pts (moderate benefit/function; low med dose; too early/not tried meds)    DIRE Score = 18        7-13: likely NOT suitable candidate for long-term  opioid analgesia       14-21: may be a suitable candidate for long-term opioid analgesia     Previous Diagnostic Tests:   Imaging Studies:      7/17/2024: MR LUMBAR SPINE W/O CONTRAST   IMPRESSION: Interval progression of multilevel lumbar spondylosis, most pronounced at L4-L5 and L5-S1, when compared to 6/27/2019. There is moderate to severe left neuroforaminal stenosis at L4-L5 with  abutment of the exiting left L4 nerve. Additional moderate to severe  bilateral neuroforaminal stenosis with abutment of the exiting  bilateral L5 nerves at L5-S1 and abutment of the descending left S1  nerve root.         PAIN RELEVANT CONDITIONS:   1.  Ankylosing spondylosis, Lumbar DDD with left S1 nerve involvement, lumbar stenosis  2.  Chronic migraine headaches  3.  Chronic cervical pain, DDD  4.  Peripheral small fiber neuropathy    ASSESSMENT AND PLAN    (G89.29) Chronic intractable pain  (primary encounter diagnosis)  (M45.8) Ankylosing spondylitis of sacral region (H)  (G62.9) Peripheral polyneuropathy  (E08.42) Diabetic polyneuropathy associated with diabetes mellitus due to underlying condition (H)  (G43.111) Intractable migraine with aura with status migrainosus  (G57.13) Meralgia paresthetica of both lower extremities  Comment: Continue current plan of care and medications. Follow up as scheduled in May  Plan:   galcanezumab-gnlm (EMGALITY) 120 MG/ML injection  PAIN INJECTION: Bilateral femoral nerve blocks      PATIENT INSTRUCTIONS:     Diagnosis reviewed, treatment option addressed, and risk/benefits discussed.  Self-care instructions given.  I am recommending a multidisciplinary treatment plan to help this patient better manage pain.    Remember to request ALL medication refills 5-7 BUSINESS days before you run out.     Health Psychology: YES, Continue per your psychologist's recommendations.  Physical therapy: YES, Continue per therapist's recommendations.   30 minute Video or Clinic follow-up with Stacia Jack  APRN, NP-C in 3 months  Procedures recommended: Bilateral Femoral Nerve Blocks with Dr Montgomery. We will call you to schedule.   Medication Management :   Continue current medication plan.   Ok to take an extra Pregabalin 150 mg as needed at bedtime for severe shingles pain.     I have reviewed the note as documented above.  This accurately captures the substance of my conversation with the patient.    BILLING TIME DOCUMENTATION:   TOTAL TIME on the date of service includes:   Time spent preparing to see the patient: 0 minutes (reviewing records and tests)  Time spend face to face with the patient: 20 minutes  Time spent ordering tests, medications, procedures and referrals: 0 minutes  Time spent Referring and communicating with other healthcare professionals: 0 minutes  Documenting clinical information in Epic: 2 minutes    The total TIME spent on this patient on the day of the appointment was 22 minutes.     JOCELYN Padgett, NP-C  [facility]    (Information in italics and blue color are taken from previous pain and consulting medical providers notes and are documented as such)

## 2025-03-27 NOTE — PATIENT INSTRUCTIONS
After Visit Instructions:     Thank you for coming to [facility] for your care. It is my goal to partner with you to help you reach your optimal state of health.   Continue daily self-care, identifying contributing factors, and monitoring variations in pain level. Continue to integrate self-care into your life.      Health Psychology: YES, Continue per your psychologist's recommendations.  Physical therapy: YES, Continue per therapist's recommendations.   30 minute Clinic follow-up with JOCELYN Zavala NP-C on 5/22/25 at 9:00   Procedures recommended: Bilateral Femoral Nerve Blocks with Dr Montgomery. We will call you to schedule.   Medication Management :   Continue current medication plan.   Ok to take an extra Pregabalin 150 mg as needed at bedtime for severe shingles pain.       JOCELYN Padgett NP-C  [facility]  [facility] - Monday, Thursday and Friday  [facility] - Tuesday      Be sure to request ALL medication refills 5 days prior to the due date whether or not you will see your medical provider in an appointment before the due date.      Do not expect same day refills. If you do not plan ahead you may run out of medications.     Early refills are not provided.  It is your responsibility to manage your medications responsibility and keep them safely stored. Lost or destroyed medications WILL NOT be replaced    Scheduling/Clinic telephone Number for ALL locations:  [phone]    After Hours On-Call Service:  [phone]    Call with any questions about your care and for scheduling assistance.   Calls are returned Monday through Friday between 8 AM and 4:00 PM. We usually get back to you within 2 business days depending on the issue/request.    If we are prescribing your medications:  For opioid medication refills, call the clinic or send a [employer] message 7 days in advance.  Please include:  Your name and date of birth.   Name of requested  medication  Name of the pharmacy.  For non-opioid medications, call your pharmacy directly to request a refill. Please allow 3-4 days to be processed.   Per [place] State Law:  All controlled substance prescriptions must be filled within 30 days of being written.    For those controlled substances allowing refills, pickup must occur within 30 days of last fill.      We believe regular attendance is key to your success in our program!    Any time you are unable to keep your appointment we ask that you call us at least 24 hours in advance to cancel.This will allow us to offer the appointment time to another patient.   Multiple missed appointments may lead to dismissal from the clinic.

## 2025-03-30 ENCOUNTER — NURSE TRIAGE (OUTPATIENT)
Dept: NURSING | Facility: CLINIC | Age: 50
End: 2025-03-30
Payer: MEDICARE

## 2025-03-30 NOTE — TELEPHONE ENCOUNTER
"Nurse Triage SBAR    Is this a 2nd Level Triage? NO    Situation: Pt calling to report that his shingles have spread to the other side of his scalp.    Background: Pt originally had an evisit 3/20/2025 and they thought he had folliculitis and then he had a virtual visit on 3/24/2025 and he was diagnosed with shingles and his valtrex was increased    Assessment: Today the rash has spread to the other side of his scalp. Per AVS patient was to seek medical attention if this happened. First side has crusted over. New side has patches of red with a \"white head\" in the middle. Pain is managed with Lyrica.     Protocol Recommended Disposition:   No disposition on file.    Recommendation: [place] is aware of [place] [facility] [place] is going to go in to be seen in person today per AVS so a provider can see the areas in person     Reason for Disposition   [1] Follow-up call to recent contact AND [2] information only call, no triage required    Protocols used: Information Only Call - No Triage-WHITNEY  Homa Douglas RN   Triage Nurse Advisor on 3/30/2025 at 2:08 PM  "

## 2025-03-31 ENCOUNTER — MYC MEDICAL ADVICE (OUTPATIENT)
Dept: FAMILY MEDICINE | Facility: CLINIC | Age: 50
End: 2025-03-31
Payer: MEDICARE

## 2025-03-31 DIAGNOSIS — J30.9 ALLERGIC RHINITIS, UNSPECIFIED SEASONALITY, UNSPECIFIED TRIGGER: ICD-10-CM

## 2025-03-31 DIAGNOSIS — K21.9 GASTROESOPHAGEAL REFLUX DISEASE WITHOUT ESOPHAGITIS: Primary | ICD-10-CM

## 2025-03-31 RX ORDER — FAMOTIDINE 20 MG/1
20 TABLET, FILM COATED ORAL DAILY
Qty: 90 TABLET | Refills: 1 | Status: SHIPPED | OUTPATIENT
Start: 2025-03-31

## 2025-03-31 RX ORDER — FLUTICASONE PROPIONATE 50 MCG
2 SPRAY, SUSPENSION (ML) NASAL EVERY EVENING
Qty: 48 G | Refills: 2 | Status: SHIPPED | OUTPATIENT
Start: 2025-03-31

## 2025-03-31 RX ORDER — OMEPRAZOLE 20 MG/1
20 CAPSULE, DELAYED RELEASE ORAL DAILY
Qty: 90 CAPSULE | Refills: 2 | Status: SHIPPED | OUTPATIENT
Start: 2025-03-31

## 2025-04-01 ENCOUNTER — HOSPITAL ENCOUNTER (EMERGENCY)
Facility: CLINIC | Age: 50
Discharge: HOME | End: 2025-04-01
Attending: EMERGENCY MEDICINE | Admitting: EMERGENCY MEDICINE
Payer: MEDICARE

## 2025-04-01 VITALS
OXYGEN SATURATION: 100 % | RESPIRATION RATE: 20 BRPM | BODY MASS INDEX: 35.31 KG/M2 | WEIGHT: 290 LBS | DIASTOLIC BLOOD PRESSURE: 77 MMHG | TEMPERATURE: 97.5 F | HEART RATE: 84 BPM | SYSTOLIC BLOOD PRESSURE: 108 MMHG | HEIGHT: 76 IN

## 2025-04-01 DIAGNOSIS — B02.9 HERPES ZOSTER WITHOUT COMPLICATION: ICD-10-CM

## 2025-04-01 LAB
ALBUMIN SERPL BCG-MCNC: 4.6 G/DL (ref 3.5–5.2)
ALP SERPL-CCNC: 55 U/L (ref 40–150)
ALT SERPL W P-5'-P-CCNC: 20 U/L (ref 0–70)
ANION GAP SERPL CALCULATED.3IONS-SCNC: 11 MMOL/L (ref 7–15)
AST SERPL W P-5'-P-CCNC: 35 U/L (ref 0–45)
BASOPHILS # BLD AUTO: 0.1 10E3/UL (ref 0–0.2)
BASOPHILS NFR BLD AUTO: 1 %
BILIRUB SERPL-MCNC: 0.3 MG/DL
BUN SERPL-MCNC: 13.2 MG/DL (ref 6–20)
CALCIUM SERPL-MCNC: 9.9 MG/DL (ref 8.8–10.4)
CHLORIDE SERPL-SCNC: 103 MMOL/L (ref 98–107)
CREAT SERPL-MCNC: 1.17 MG/DL (ref 0.67–1.17)
EGFRCR SERPLBLD CKD-EPI 2021: 75 ML/MIN/1.73M2
EOSINOPHIL # BLD AUTO: 0.2 10E3/UL (ref 0–0.7)
EOSINOPHIL NFR BLD AUTO: 2 %
ERYTHROCYTE [DISTWIDTH] IN BLOOD BY AUTOMATED COUNT: 13 % (ref 10–15)
GLUCOSE SERPL-MCNC: 79 MG/DL (ref 70–99)
HCO3 SERPL-SCNC: 24 MMOL/L (ref 22–29)
HCT VFR BLD AUTO: 42.3 % (ref 40–53)
HGB BLD-MCNC: 14.6 G/DL (ref 13.3–17.7)
IMM GRANULOCYTES # BLD: 0 10E3/UL
IMM GRANULOCYTES NFR BLD: 0 %
LYMPHOCYTES # BLD AUTO: 3.6 10E3/UL (ref 0.8–5.3)
LYMPHOCYTES NFR BLD AUTO: 43 %
MCH RBC QN AUTO: 31.9 PG (ref 26.5–33)
MCHC RBC AUTO-ENTMCNC: 34.5 G/DL (ref 31.5–36.5)
MCV RBC AUTO: 92 FL (ref 78–100)
MONOCYTES # BLD AUTO: 1.2 10E3/UL (ref 0–1.3)
MONOCYTES NFR BLD AUTO: 14 %
NEUTROPHILS # BLD AUTO: 3.4 10E3/UL (ref 1.6–8.3)
NEUTROPHILS NFR BLD AUTO: 40 %
NRBC # BLD AUTO: 0 10E3/UL
NRBC BLD AUTO-RTO: 0 /100
PLATELET # BLD AUTO: 218 10E3/UL (ref 150–450)
POTASSIUM SERPL-SCNC: 4.5 MMOL/L (ref 3.4–5.3)
PROT SERPL-MCNC: 7.1 G/DL (ref 6.4–8.3)
RBC # BLD AUTO: 4.58 10E6/UL (ref 4.4–5.9)
SODIUM SERPL-SCNC: 138 MMOL/L (ref 135–145)
WBC # BLD AUTO: 8.3 10E3/UL (ref 4–11)

## 2025-04-01 PROCEDURE — 85018 HEMOGLOBIN: CPT | Performed by: EMERGENCY MEDICINE

## 2025-04-01 PROCEDURE — 250N000009 HC RX 250: Performed by: EMERGENCY MEDICINE

## 2025-04-01 PROCEDURE — 36415 COLL VENOUS BLD VENIPUNCTURE: CPT | Performed by: EMERGENCY MEDICINE

## 2025-04-01 PROCEDURE — 99283 EMERGENCY DEPT VISIT LOW MDM: CPT | Performed by: EMERGENCY MEDICINE

## 2025-04-01 PROCEDURE — 85004 AUTOMATED DIFF WBC COUNT: CPT | Performed by: EMERGENCY MEDICINE

## 2025-04-01 PROCEDURE — 82040 ASSAY OF SERUM ALBUMIN: CPT | Performed by: EMERGENCY MEDICINE

## 2025-04-01 PROCEDURE — 82310 ASSAY OF CALCIUM: CPT | Performed by: EMERGENCY MEDICINE

## 2025-04-01 PROCEDURE — 99284 EMERGENCY DEPT VISIT MOD MDM: CPT | Performed by: EMERGENCY MEDICINE

## 2025-04-01 RX ORDER — PROPARACAINE HYDROCHLORIDE 5 MG/ML
1 SOLUTION/ DROPS OPHTHALMIC ONCE
Status: COMPLETED | OUTPATIENT
Start: 2025-04-01 | End: 2025-04-01

## 2025-04-01 RX ADMIN — PROPARACAINE HYDROCHLORIDE 1 DROP: 5 SOLUTION/ DROPS OPHTHALMIC at 14:29

## 2025-04-01 ASSESSMENT — COLUMBIA-SUICIDE SEVERITY RATING SCALE - C-SSRS
2. HAVE YOU ACTUALLY HAD ANY THOUGHTS OF KILLING YOURSELF IN THE PAST MONTH?: NO
1. IN THE PAST MONTH, HAVE YOU WISHED YOU WERE DEAD OR WISHED YOU COULD GO TO SLEEP AND NOT WAKE UP?: NO
6. HAVE YOU EVER DONE ANYTHING, STARTED TO DO ANYTHING, OR PREPARED TO DO ANYTHING TO END YOUR LIFE?: NO

## 2025-04-01 ASSESSMENT — VISUAL ACUITY
OD: 20/15;WITH CORRECTIVE LENSES
OS: 20/20;WITH CORRECTIVE LENSES

## 2025-04-01 ASSESSMENT — ACTIVITIES OF DAILY LIVING (ADL)
ADLS_ACUITY_SCORE: 54

## 2025-04-01 NOTE — ED NOTES
Bed: ED24  Expected date:   Expected time:   Means of arrival:   Comments:  Shingles from lobby once clean

## 2025-04-01 NOTE — ED PROVIDER NOTES
"ED Provider Note  [facility]      History     Chief Complaint   Patient presents with    shingles flareup     HPI  Joel Pineda is a 49 year old male with a history of DM II with neuropathy, shingles on prophylactic Valtrex and lumbar radiculopathy who presents to the ED for facial pain.  He reports last week he noted some small painful lesions on the posterior right side of his scalp.  He was seen with primary care and felt to be folliculitis.  He subsequently developed a second lesion and was reevaluated and felt this could possibly represent a shingles flare though somewhat unclear.  He did increase his home Valtrex at that time.  He comes in today because of ongoing pain along the right side of his scalp, as well as concern for a lesion at the left base of the scalp and noted some eye redness at home.  He denies any fevers.  He does have a history of chronic migraines but denies any worsening symptoms.  He has not had any recent vomiting.  Denies any neck pain or stiffness.  No abdominal pain.  He has not noted rashes elsewhere.  He denies any changes in his vision.  He felt like he may have been poked on the right side of his eye but denies any severe eye pain.    Physical Exam   BP: (!) 145/90  Pulse: 84  Temp: 97.8  F (36.6  C)  Resp: 16  Height: 193 cm (6' 4\")  Weight: 131.5 kg (290 lb)  SpO2: 99 %  Physical Exam  General: patient is alert and oriented and in no acute distress   Head: atraumatic and normocephalic   EENT: moist mucus membranes without tonsillar erythema or exudates, open sores noted on the tongue or posterior oropharynx, pupils 3 mm, equal round and reactive, no injection noted of the conjunctiva, intraocular pressure is 19 on the right and 18 on the left, visual acuity is 20/20 left and 20/15 in the right, no evidence of fluorescein uptake or corneal abnormality noted on fluorescein exam.  Neck: supple   Cardiovascular: regular rate and rhythm, extremities warm " and well perfused, no lower extremity edema  Pulmonary: lungs clear to auscultation bilaterally   Abdomen: soft, non-tender   Musculoskeletal: normal range of motion   Neurological: alert and oriented, moving all extremities symmetrically, gait normal   Skin: warm, dry             ED Course, Procedures, & Data      Procedures            No results found for any visits on 04/01/25.  Medications - No data to display  Labs Ordered and Resulted from Time of ED Arrival to Time of ED Departure - No data to display  No orders to display          Critical care was not performed.     Medical Decision Making  The patient's presentation was of moderate complexity (an undiagnosed new problem with uncertain prognosis).    The patient's evaluation involved:  ordering and/or review of 2 test(s) in this encounter (see separate area of note for details)    The patient's management necessitated moderate risk (prescription drug management including medications given in the ED).    Assessment & Plan    Mr. Pineda is a 49 year old male with a history of DM II with neuropathy, shingles on prophylactic Valtrex and lumbar radiculopathy who presents to the ED for facial pain.  Hemodynamically stable, afebrile and in no respiratory distress.  He is neurovascularly intact.  On exam he does have small lesions on the posterior scalp however somewhat unclear if these are truly herpetic versus folliculitis.  I do not appreciate any lesions on the rest of the skin.  He did report some eye discomfort however visual acuity is normal.  No elevation in intraocular pressure and no evidence of dendritic lesion on exam.  Referral was placed for expedited ophthalmology clinic follow-up.  I did review his images with dermatology and also somewhat unclear if this is truly representing zoster versus folliculitis.  He is immunosuppressed and is at an increased risk for bilateral zoster.  I do not appreciate any evidence of disseminated infection at this time.   Will plan to continue high-dose Valtrex with close return precautions.    I have reviewed the nursing notes. I have reviewed the findings, diagnosis, plan and need for follow up with the patient.    New Prescriptions    No medications on file       Final diagnoses:   Herpes zoster without complication     Sneha Gold MD  [facility] EMERGENCY DEPARTMENT  4/1/2025     Sneha Gold MD  04/01/25 154

## 2025-04-01 NOTE — ED TRIAGE NOTES
Came in due to having increased pain on the R side of his tongue, R eye and bilateral lower occipital area of scalp. Having burning sensation on R side of tongue constantly, scalp burning pain is intermittent. Describes that R eye feels like it is being poked by a finger and is red. Endorses some dizziness. Also stated that Lyrica dose was increased mid march to help with chronic pain. Chronic pain along belt line of back that has also become worse with shingles flare up.     Hx of Wil braden    Started Valtrex 2 days ago for shingles exacerbation.      Triage Assessment (Adult)       Row Name 04/01/25 1326          Triage Assessment    Airway WDL WDL        Respiratory WDL    Respiratory WDL WDL        Skin Circulation/Temperature WDL    Skin Circulation/Temperature WDL WDL        Cardiac WDL    Cardiac WDL X  hypertensive        Peripheral/Neurovascular WDL    Peripheral Neurovascular WDL X  increased pain from shingles        Cognitive/Neuro/Behavioral WDL    Cognitive/Neuro/Behavioral WDL WDL        Ana Luisa Coma Scale    Best Eye Response 4-->(E4) spontaneous     Best Motor Response 6-->(M6) obeys commands     Best Verbal Response 5-->(V5) oriented     Ana Luisa Coma Scale Score 15

## 2025-04-01 NOTE — DISCHARGE INSTRUCTIONS
Follow-up with ophthalmology in clinic for reevaluation of your eye.  [facility] will call you to coordinate your care as prescribed by your provider. If you don't hear from a representative within 2 business days, please call [phone].    Continue taking Valtrex 1 g 3 times a day for the next 14 days.    If you have any worsening symptoms including fevers, increased pain, vision changes or other concerns return to the emergency department for reevaluation.

## 2025-04-02 ENCOUNTER — PATIENT OUTREACH (OUTPATIENT)
Dept: FAMILY MEDICINE | Facility: CLINIC | Age: 50
End: 2025-04-02
Payer: MEDICARE

## 2025-04-02 ENCOUNTER — TELEPHONE (OUTPATIENT)
Dept: FAMILY MEDICINE | Facility: CLINIC | Age: 50
End: 2025-04-02
Payer: MEDICARE

## 2025-04-02 ENCOUNTER — TELEPHONE (OUTPATIENT)
Dept: OPHTHALMOLOGY | Facility: CLINIC | Age: 50
End: 2025-04-02
Payer: MEDICARE

## 2025-04-02 DIAGNOSIS — G89.29 CHRONIC INTRACTABLE PAIN: ICD-10-CM

## 2025-04-02 DIAGNOSIS — M47.816 LUMBAR FACET ARTHROPATHY: ICD-10-CM

## 2025-04-02 DIAGNOSIS — G62.9 PERIPHERAL POLYNEUROPATHY: ICD-10-CM

## 2025-04-02 DIAGNOSIS — K13.79 MOUTH PAIN: ICD-10-CM

## 2025-04-02 DIAGNOSIS — M45.8 ANKYLOSING SPONDYLITIS OF SACRAL REGION (H): ICD-10-CM

## 2025-04-02 DIAGNOSIS — Z86.19 HISTORY OF SHINGLES: ICD-10-CM

## 2025-04-02 RX ORDER — LIDOCAINE HYDROCHLORIDE 20 MG/ML
15 SOLUTION OROPHARYNGEAL
Qty: 100 ML | Refills: 1 | Status: SHIPPED | OUTPATIENT
Start: 2025-04-02

## 2025-04-02 NOTE — TELEPHONE ENCOUNTER
Yes, I can send in that oral lidocaine for him.  But I would say he does not necessarily need an ER follow-up.

## 2025-04-02 NOTE — TELEPHONE ENCOUNTER
I only picked 100 mL because that was a size listed in epic.  I have no idea how much she is going to be using since I have never seen him for this problem.  If the patient thinks he is going to need a bigger bottle we can make it bigger but I am barely involved in this.  I just did it out of courtesy.

## 2025-04-02 NOTE — TELEPHONE ENCOUNTER
Pharmacy calling in looking for clarification on dispense qty for xylocaine 2% oral solution sent in today (see patient outreach today, writer created new encounter as it looks like that encounter had additional follow up for patient needed).    Pharmacy states that prescription sig makes sense, however the max daily dose (8 doses in 24 hour period) would equate to max of 120 mL daily, but dispense qty is 100 mL. Pharmacy asking if this is still OK to dispense or if provider would want to send increased dispense quantity.          Routing to provider to review/advise, thank you      Haylie Billingsley, RN, BSN  [facility] [place] [facility] [place]

## 2025-04-02 NOTE — TELEPHONE ENCOUNTER
Provider:  I have instructed the pharmacy to fill 100 mL's for there reasoning below. If other action needs to be taken please let us know.  Thank you. Akiko Hodge R.N.    Patient reports that he wasn't necessarily advised by the hospital to use the viscous lidocaine for his symptoms. Tito had a left over Prescription(s) (100 mLs bottle) that was given to him by Jing Priest on 2022 for a sore throat. He thought he would ask for a new Prescription(s) instead of using an  Prescription(s).  It is working for his symptoms. He reports that the hospital was not really interested in pain/symptoms control.    Nursing advice: I informed that patient that we will instruct the pharmacy to fill the 100 mL's and if he need more he will need to be seen.  Patient verbalized good understanding, agrees with plan and needs no further support.  Thank you. Akiko Hodge R.N.

## 2025-04-02 NOTE — TELEPHONE ENCOUNTER
RN contacted patient to do hospital follow up call. Patient has two questions:    Can PCP refill viscous lidocaine oral solution for sores in mouth?   Does patient need to have a hospital follow up? Patient is currently scheduled for 4/8/25.     Please review and advise. Festus Rowley, RN, BSN, PHN

## 2025-04-02 NOTE — TELEPHONE ENCOUNTER
Transitions of Care Outreach  No chief complaint on file.      Most Recent Admission Date: 4/1/2025   Most Recent Admission Diagnosis:      Most Recent Discharge Date: 4/1/2025   Most Recent Discharge Diagnosis: Herpes zoster without complication - B02.9     Transitions of Care Assessment    Discharge Assessment  How are you doing now that you are home?: still having some sx. has shingles, went to ER due to tongue and throat burning and had some burning in eye. does have follow up scheduled with opthalmology. uses lidocaine viscous which helps with the mouth sores  How are your symptoms? (Red Flag symptoms escalate to triage hotline per guidelines): Unchanged  Do you know how to contact your clinic care team if you have future questions or changes to your health status? : Yes  Does the patient have their discharge instructions? : Yes  Does the patient have questions regarding their discharge instructions? : No  Were you started on any new medications or were there changes to any of your previous medications? : No  Does the patient have all of their medications?: Yes  Do you have questions regarding any of your medications? : No  Do you have all of your needed medical supplies or equipment (DME)?  (i.e. oxygen tank, CPAP, cane, etc.): Yes    Follow up Plan     Discharge Follow-Up  Discharge follow up appointment scheduled in alignment with recommended follow up timeframe or Transitions of Risk Category? (Low = within 30 days; Moderate= within 14 days; High= within 7 days): Yes  Discharge Follow Up Appointment Date: 04/08/25  Discharge Follow Up Appointment Scheduled with?: Primary Care Provider    Future Appointments   Date Time Provider Department Center   4/8/2025 11:00 AM Ksenia Lyles MD [facility]   4/9/2025 11:00 AM Shirley Bartlett PsyD [facility]   4/15/2025  1:00 PM Robles Ballard [facility]   4/16/2025  2:00 PM Shirley Bartlett PsyD [facility]   4/18/2025  1:00 PM Mayra  Sami KOCH, MONIQUE UUEYE [facility]   4/23/2025  2:00 PM Shirley Bartlett, PsyD [facility]   4/30/2025  2:00 PM Shirley Bartlett, PsyD [facility] OX   5/6/2025  2:00 PM Radha Mcdonald, [facility]   5/22/2025  9:00 AM Stacia Jack, APRN CNP [facility]   6/3/2025 10:30 AM [facility]   6/5/2025 10:45 AM Josefina Patino MD [facility]   7/2/2025 12:00 PM Sue Gunter MD [facility]   7/7/2025 12:40 PM [facility]   7/14/2025  2:00 PM Joel Damico MD [facility]   10/16/2025  1:00 PM Frantz Kaur MD [facility]   11/5/2025  2:00 PM Homa Marie PA-C [facility] ERICK MAN       Outpatient Plan as outlined on AVS reviewed with patient.    For any urgent concerns, please contact our 24 hour nurse triage line: [phone] ([phone])       Patricia Rowley RN

## 2025-04-02 NOTE — TELEPHONE ENCOUNTER
Called and spoke to Tito     Made an appointment for April 18 with Dr. Mayra Francis stated his symptoms have improved and will call for a sooner appointment if his symptoms get worse.     Tito will cancel his appointment with Dr. Nunez if he feels he does not need to be seen on 4/18     Appointment information will be on [employer] on 4/2/2025 at 9:51 AM

## 2025-04-02 NOTE — TELEPHONE ENCOUNTER
Patient notified of the message below. He will keep the appointment until closer to that time to see if he continues to improve as Dr. Lyles is difficult to get into. He will cancel it if it is not needed. Patient verbalized good understanding, agrees with plan and needs no further support.  Thank you. Akiko Hodge R.N.

## 2025-04-03 ENCOUNTER — OFFICE VISIT (OUTPATIENT)
Dept: OPHTHALMOLOGY | Facility: CLINIC | Age: 50
End: 2025-04-03
Attending: OPHTHALMOLOGY
Payer: MEDICARE

## 2025-04-03 DIAGNOSIS — H04.123 DRY EYES, BILATERAL: ICD-10-CM

## 2025-04-03 DIAGNOSIS — B02.8 HERPES ZOSTER DERMATITIS: Primary | ICD-10-CM

## 2025-04-03 DIAGNOSIS — L30.8 HERPES ZOSTER DERMATITIS: Primary | ICD-10-CM

## 2025-04-03 ASSESSMENT — REFRACTION_WEARINGRX
OS_CYLINDER: +1.50
OS_AXIS: 097
OD_SPHERE: -3.75
OD_CYLINDER: +1.25
OS_SPHERE: -4.50
SPECS_TYPE: PAL
OS_ADD: +1.75
OD_ADD: +1.75
OD_AXIS: 038

## 2025-04-03 ASSESSMENT — CUP TO DISC RATIO
OS_RATIO: 0.2
OD_RATIO: 0.25

## 2025-04-03 ASSESSMENT — TONOMETRY
OS_IOP_MMHG: 11
OD_IOP_MMHG: 14
IOP_METHOD: ICARE

## 2025-04-03 ASSESSMENT — VISUAL ACUITY
METHOD: SNELLEN - LINEAR
OD_CC: 20/20
CORRECTION_TYPE: GLASSES
OS_CC: 20/20

## 2025-04-03 ASSESSMENT — EXTERNAL EXAM - RIGHT EYE: OD_EXAM: NORMAL

## 2025-04-03 ASSESSMENT — SLIT LAMP EXAM - LIDS
COMMENTS: MGD
COMMENTS: NORMAL

## 2025-04-03 ASSESSMENT — EXTERNAL EXAM - LEFT EYE: OS_EXAM: NORMAL

## 2025-04-03 NOTE — TELEPHONE ENCOUNTER
"[facility] Call Center    Phone Message    May a detailed message be left on voicemail: yes     Reason for Call: Symptoms or Concerns     If patient has red-flag symptoms, warm transfer to triage line    Current symptom or concern: \"Ulcer\" right eye with redness.    Symptoms have been present for:  1 day(s)    Has patient previously been seen for this? No    By :     Date:     Are there any new or worsening symptoms? Yes: Patient states this morning he noticed what appears to be an ulcer on his right eyeball with some redness.  Please call.  Thank you.    Action Taken: Message routed to:  [facility] Optometry    Travel Screening: Not Applicable     Date of Service:                                                                     "

## 2025-04-03 NOTE — NURSING NOTE
"Chief Complaints and History of Present Illnesses   Patient presents with    Herpes Zoster Evaluation     Chief Complaint(s) and History of Present Illness(es)       Herpes Zoster Evaluation              Laterality: both eyes              Comments    Patient states was seen in the ER about two days ago and was recommended to be seen to rule out any ocular involvement with recent shingles reactivation. Is 10 days in to a second course of antivirals and has been taking gel eye drops with slight improvement noticed with \"burning/finger poke sensation\" right eye. Redness noticed front of right eye, as well as possible \"ulceration\" on white part of eye. Possible changes to skin right upper lid noticed per patient. Has floaters, not bothersome, no flashes of light currently. Shingles was affecting back of scalp and has gone from the right to the left sides. Seems to have crusted over now. Nothing on trunk. Patient is concerned due to having lower immune system.    DM, does not check blood sugars regularly;  Lab Results       Component                Value               Date                       A1C                      5.8                 01/15/2025                 A1C                      5.5                 06/24/2024                 A1C                      5.7                 02/15/2023                 A1C                      6.4                 09/23/2022                 A1C                      6.3                 02/10/2022                 A1C                      6.5                 02/10/2021                 A1C                      6.0                 10/16/2020                 A1C                      6.1                 08/18/2020                 A1C                      6.0                 01/24/2020                 A1C                      5.9                 09/13/2019            Sandy Gupta on 4/3/2025 at 9:47 AM                         "

## 2025-04-03 NOTE — PROGRESS NOTES
"Ophthalmology Acute Clinic     Chief Complaint(s) and History of Present Illness(es)       Herpes Zoster Evaluation    In both eyes.             Comments    Patient states was seen in the ER about two days ago and was recommended to be seen to rule out any ocular involvement with recent shingles reactivation. Is 10 days in to a second course of antivirals and has been taking gel eye drops with slight improvement noticed with \"burning/finger poke sensation\" right eye. Redness noticed front of right eye, as well as possible \"ulceration\" on white part of eye. Possible changes to skin right upper lid noticed per patient. Has floaters, not bothersome, no flashes of light currently. Shingles was affecting back of scalp and has gone from the right to the left sides. Seems to have crusted over now. Nothing on trunk. Patient is concerned due to having lower immune system.    DM, does not check blood sugars regularly;  Lab Results       Component                Value               Date                       A1C                      5.8                 01/15/2025                 A1C                      5.5                 06/24/2024                 A1C                      5.7                 02/15/2023                 A1C                      6.4                 09/23/2022                 A1C                      6.3                 02/10/2022                 A1C                      6.5                 02/10/2021                 A1C                      6.0                 10/16/2020                 A1C                      6.1                 08/18/2020                 A1C                      6.0                 01/24/2020                 A1C                      5.9                 09/13/2019            Sandy Gupta on 4/3/2025 at 9:47 AM                           HPI:   Joel Pineda is a 49 year old male  with PMHx of T2DM, ankylosing spondylitis, hypothyroidism, HLD, POTS, myopia with astigmatism and presbyopia, " allergic conjunctivitis, and shingles (on prophylactic  who presents to the acute eye clinic following referral by the [facility] emergency department (4/1/25).    Patient reports in mid-March he developed a bump on the back of his head/occiput, initially saw PCP via E-visit and diagnosed with folliculitis. A few days later, developed a second lesion on the back of his head which prompted re-evaluation by PCP, who subsequently changed diagnosis to likely herpes zoster. Patient presented to [facility] ED on 4/1 for new onset tingling of the mouth and intermittent eye pain. Patient was told to increase Valtrex dosing to 1g TID and to follow up with ophthalmology as an outpatient.    ROS    ENT: Normal  Cardiac: Normal  Derm: Normal (Comment: Dealing with shingles affects)  Respiratory: Normal  Muscle/Skel: Normal  Allergic/Immunology: Normal  Neuro: Normal  Psych: Normal  Endocrine: Normal  Heme: Normal  Rheumatologic: Normal  : Normal  GI: Normal  Constitutional: Normal       Past Ocular history:   - Glasses:Yes  - Contact lens wear: None  - Ocular Surgical History: Myopia with astigmatism +presbyopia, Last eye visit 5/28/24 with   - Current Eye drops: Gel lubricating drop, BID    PMH:   Past Medical History:   Diagnosis Date    Acne     Acquired hypothyroidism     Allergic state     Ankylosing spondylitis lumbar region (H)     Ankylosing spondylitis of sacral region (H)     Anxiety     Bipolar 2 disorder (H)     Chest pain     Chest pain, regulated w/BP meds. Clear arteries.    Chronic pain     Chronic, continuous use of opioids     DDD (degenerative disc disease), lumbar     Depressive disorder     Diabetes (H)     Diverticulosis     Dysautonomia (H)     Facet arthritis of cervical region     Gastroesophageal reflux disease     Hypertension     IBS (irritable bowel syndrome)     Intracranial arachnoid cyst     Lumbar radiculopathy     Major depressive disorder, recurrent episode     Multiple psych providers -  they manage meds August 2015: Provigil induced severe mood dis-function     Major depressive disorder, recurrent episode, severe (H) 12/02/2020    MDD (major depressive disorder), recurrent severe, without psychosis (H) 07/21/2021    Mixed dyslipidemia 04/06/2023    Obese     LLOYD (obstructive sleep apnea)- mild (AHI 11)     Polyneuropathy     POTS (postural orthostatic tachycardia syndrome)     Pulmonary embolism (H)     Skin exam, screening for cancer 12/03/2013    Sleep apnea     Thrombosis     Uncomplicated asthma         FH: No glaucoma or AMD.     Review of systems for the eyes was negative other than the pertinent positives/negatives listed in the HPI.      Imaging:   None    Assessment & Plan      Joel Pineda is a 49 year old male with the following diagnoses:   1. Herpes zoster dermatitis    Va 20/20 each eye, IOP 14/11. External exam negative for periorbital lesions and no visible rodriguez sign. Anterior segment exam of both eyes with no appreciable dendritic or pseudo dendritic corneal lesions. Anterior chambers deep and quiet. Dilated exam of both eyes with no acute optic nerve, macular or peripheral pathology noted.      PLAN:  -No clinical signs of herpes zoster ophthalmicus  -Continue Valtrex as prescribed by PCP/ED provider  -Follow up with PCP regarding duration of therapeutic Valtrex vs. Transition to prophylactic dosing        Patient disposition:   Return if symptoms worsen or fail to improve.    Patient seen with Dr. Lazaro    Physician Attestation     Attending Physician Attestation:  Complete documentation of historical and exam elements from today's encounter can be found in the full encounter summary report (not reduplicated in this progress note). I personally obtained the chief complaint(s) and history of present illness. I confirmed and edited as necessary the review of systems, past medical/surgical history, family history, social history, and examination findings as documented by  others; and I examined the patient myself. I personally reviewed the relevant tests, images, and reports as documented above. I personally reviewed the ophthalmic test(s) associated with this encounter, agree with the interpretation(s) as documented by the resident/fellow and have edited the corresponding report(s) as necessary. I formulated and edited as necessary the assessment and plan and discussed the findings and management plan with the patient and any family members present at the time of the visit.      Shaina Leslie MD  Professor, Department of Ophthalmology and Neuroscience  [facility]     Thank you for entrusting us with your care  Sloane Valdes MD  Resident Physician, PGY-2  Department of Ophthalmology

## 2025-04-03 NOTE — TELEPHONE ENCOUNTER
Eye symptoms resolved yesterday and today has what looks like ulcerations on the white part of the eye.  Is having recurrence of pain and redness.  Is on Valtrex 1 gram 3 times a day as he was recently diagnosed with shingles .  Not using any eye drops.       Radha Mares on 4/3/2025 at 7:30 AM

## 2025-04-05 DIAGNOSIS — I10 ESSENTIAL HYPERTENSION WITH GOAL BLOOD PRESSURE LESS THAN 140/90: ICD-10-CM

## 2025-04-07 ENCOUNTER — PHARMACY VISIT (OUTPATIENT)
Dept: ADMINISTRATIVE | Facility: CLINIC | Age: 50
End: 2025-04-07
Payer: MEDICARE

## 2025-04-07 RX ORDER — METOPROLOL SUCCINATE 200 MG/1
200 TABLET, EXTENDED RELEASE ORAL DAILY
Qty: 90 TABLET | Refills: 1 | Status: SHIPPED | OUTPATIENT
Start: 2025-04-07

## 2025-04-07 NOTE — PROGRESS NOTES
Ankylosing Spondylitis Clinical Follow Up Assessment  I spoke with Joel for [facility] TM assessment.     Current Regimen: Enbrel 50mg weekly.     He is currently finishing up treatment for Shingles. This is his 5th time having shingles. Stated with his lowered immune system, he has been more susceptible to this. This most recent bout required 2 courses of Valtex. He has 2 more doses remaining. He has been holding Enbrel for 3 weeks. Plans to resume 4/14/25.     RA: R3 QOL Text via [employer], R3 score 13.33, Pain (5), QOL(7), progressing towards goal.     He stated he can tell when he is off enbrel. His back has been bothering him while holding doses. Also explains higher R3 score. No questions or concerns today.     Ok for TM to follow up next quarter and invited pt to call anytime with questions or concerns.        Care Details    Dress yourself, including tying shoelaces and doing buttons?   ? Without any difficulty           Get in and out of bed?   ? Without any difficulty           Lift a full cup or glass to your mouth?   ? Without any difficulty           Walk outdoors on flat ground?   ? Without any difficulty           Wash and dry your entire body?   ? Without any difficulty           Bend down to  clothing from the floor?   ? Without any difficulty           Turn regular faucets on and off?   ? Without any difficulty           Get in and out of a car, bus, train or airplane?   ? Without any difficulty           Walk two miles or three kilometers, if you wish?   ? With some difficulty           Participate in recreational activities and sports as you would like, if you wish?   ? Unable to do      How much pain have you had because of your condition over the past week? Please indicate how severe your pain has been, on the scale from 0-10 scale (with 0 being no pain and 10 being pain as bad as it could be)   ? 5.0           Considering all the ways in which illness and health  conditions may affect you at this time, please indicate how you are doing on the scale of 0-10 (with 0 being very well and 10 being very poorly)   ? 7.0           Please enter the RAPID-3 score for this AS patient:   ? 13.33      What are the patient's goals for this therapy?   ? Keep RA symptoms under control      Is the patient meeting treatment goals?   ? Progressing towards goals      Would you like to review quality of life questions with the patient? Select YES   ? YES      Please select CURRENT side effect(s) for monitoring:   ? None         Gwendolyn Gil, Pharm.D.[facility]  [address] [address]  [email or web address]  Office: [phone]

## 2025-04-09 ENCOUNTER — VIRTUAL VISIT (OUTPATIENT)
Facility: CLINIC | Age: 50
End: 2025-04-09
Payer: MEDICARE

## 2025-04-09 DIAGNOSIS — F33.1 MAJOR DEPRESSIVE DISORDER, RECURRENT EPISODE, MODERATE (H): Primary | ICD-10-CM

## 2025-04-09 DIAGNOSIS — F41.1 GENERALIZED ANXIETY DISORDER: ICD-10-CM

## 2025-04-09 PROCEDURE — 90837 PSYTX W PT 60 MINUTES: CPT | Mod: 95 | Performed by: PSYCHOLOGIST

## 2025-04-09 NOTE — PROGRESS NOTES
[facility]                                     Progress Note    Patient Name: Joel Pineda  Date: April 9, 2025            Service Type: Individual      Session Start Time: 11:01 AM Session End Time: 11:57 AM     Session Length: 56 minutes    Session #: 18    Attendees: Client attended alone    Service Modality:  Video Visit:      Provider verified identity through the following two step process.  Patient provided:  Patient photo and Patient is known previously to provider    Telemedicine Visit: The patient's condition can be safely assessed and treated via synchronous audio and visual telemedicine encounter.      Reason for Telemedicine Visit: Patient convenience (e.g. access to timely appointments / distance to available provider)    Originating Site (Patient Location): Patient's other parked car at [facility] parking ramp    Distant Site (Provider Location): Provider Remote Setting- Home Office    Consent:  The patient/guardian has verbally consented to: the potential risks and benefits of telemedicine (video visit) versus in person care; bill my insurance or make self-payment for services provided; and responsibility for payment of non-covered services.     Patient would like the video invitation sent by:  Text to cell phone: [phone]    Mode of Communication:  Video Conference via [employer]    Distant Location (Provider):  Off-site    As the provider I attest to compliance with applicable laws and regulations related to telemedicine.    DATA  Interactive Complexity: No   Crisis: No        Progress Since Last Session (Related to Symptoms / Goals / Homework):   Symptoms: No change Tito continues to report low mood, ongoing anxiety and panic.    Homework: Achieved / completed to satisfaction - using DBT skills for emotion regulation.      Episode of Care Goals: Satisfactory progress - CONTEMPLATION (Considering change and yet undecided); Intervened by assessing the negative and positive  thinking (ambivalence) about behavior change     Current / Ongoing Stressors and Concerns:   Tito reports he learned his father had some kind of heart event, does not have much information as his father is not sharing much further. His mother has been discharged back home with in-home care. Explored strategies to engage in for self-care. Explored facts of situation rather than making assumptions which often lead to Tito feeling overwhelmed emotionally and feeling angry. He reports experiencing others being 'bossy' especially as it relates to mother's health, and he struggles with mother 'just taking it.' Using pressure cooker analogy, discussed finding ways to release pressure outside of using medication (klonopin) or walking to prevent explosive emotional expression. We discussed cultivated a 'menu' of coping skills to cover several different scenario locations and levels of intensity of emotion. He expresses intense frustration about aspects of his mother's care plan, including others ideas of what care she should receive, while also balancing his awareness of mother's right to her own agency. Explored coping strategies he could utilize after session ended to re-regulate.     Treatment Objective(s) Addressed in This Session:   use cognitive strategies identified in therapy to challenge anxious thoughts  state understanding of stressors and relationship to physical symptoms  Decrease frequency and intensity of feeling down, depressed, hopeless  Identify negative self-talk and behaviors: challenge core beliefs, myths, and actions  reduce their emotional vulnerability by 40% during the Emotion Regulation Module (6-8 weeks)      Intervention:      CBT: cognitive challenging, restructuring, reframing Behavioral activation techniques, grounding strategies  PCT: supportive autonomy, unconditional positive regard, empathic reflection, active listening  DBT: self-soothing with the 5 senses, TIP Skill, IMPROVE the  moment    Assessments completed prior to visit:  The following assessments were completed by patient for this visit:  The following assessments were completed by patient for this visit:  PHQ9:       12/17/2024     2:51 PM 1/1/2025     3:48 PM 1/14/2025     9:37 AM 1/28/2025     8:32 AM 2/18/2025    10:29 AM 3/4/2025     2:20 PM 3/12/2025    11:35 AM   PHQ-9 SCORE   PHQ-9 Total Score MyChart 15 (Moderately severe depression) 8 (Mild depression) 10 (Moderate depression) 10 (Moderate depression) 10 (Moderate depression) 6 (Mild depression) 8 (Mild depression)   PHQ-9 Total Score 15  8  10  10  10  6  8        Patient-reported     GAD7:       8/1/2024     9:28 AM 10/20/2024     2:25 PM 12/3/2024    12:14 PM 12/17/2024     2:51 PM 1/1/2025     3:50 PM 2/18/2025     2:18 PM 3/12/2025    11:39 AM   YUDI-7 SCORE   Total Score 7 (mild anxiety) 7 (mild anxiety) 9 (mild anxiety) 12 (moderate anxiety) 8 (mild anxiety) 10 (moderate anxiety) 15 (severe anxiety)   Total Score 7 7 9  12  8  10  15        Patient-reported         ASSESSMENT: Current Emotional / Mental Status (status of significant symptoms):   Risk status (Self / Other harm or suicidal ideation)   Patient reports the following current fears or concerns for personal safety: Ongoing passive thoughts of suicide with absence of plan or intent and agreed to follow his previously developed [facility] Safety Plan.   Patient denies current or recent suicidal ideation or behaviors.   Patient denies current or recent homicidal ideation or behaviors.   Patient denies current or recent self injurious behavior or ideation.   Patient denies other safety concerns.   Patient reports there has been no change in risk factors since their last session.     Patient reports there has been no change in protective factors since their last session.     A safety and risk management plan has been developed including: Patient consented to co-developed safety plan on 10/21/2024.  Safety and  risk management plan was reviewed.   Patient agreed to use safety plan should any safety concerns arise.  A copy was made available to the patient.     Appearance:   Appropriate    Eye Contact:   Fair    Psychomotor Behavior: Normal    Attitude:   Cooperative    Orientation:   All   Speech    Rate / Production: Normal/ Responsive    Volume:  Normal    Mood:    Anxious  Depressed  Irritable  Sad    Affect:    Subdued  Worrisome    Thought Content:  Clear    Thought Form:  Coherent    Insight:    Fair      Medication Review:   Changes to psychiatric medications, see updated Medication List in EPIC.       Medication Compliance:   Yes - may not take some PRNs as often as he could     Changes in Health Issues:   None reported     Chemical Use Review:   Substance Use: Chemical use reviewed, no active concerns identified      Tobacco Use: No current tobacco use.      Diagnosis:  296.32 (F33.1) Major Depressive Disorder, Recurrent Episode, Moderate _.  300.02 (F41.1) Generalized Anxiety Disorder.    Collateral Reports Completed:   Not Applicable    PLAN: (Patient Tasks / Therapist Tasks / Other)    Patient to continue to use non-judgmental stance and cope ahead skills.     Patient and provider will continue practicing how to reframe automatic thinking from negative situations.     Patient will practice radical self-compassion techniques to decrease shame.     Patient to engage in basic self-cares and focus on present moment.     Shirley Bartlett PsyD LP                                                      ______________________________________________________________________    Individual Treatment Plan    Patient's Name: Joel Pineda  YOB: 1975    Date of Creation: October 30, 2024   Date Treatment Plan Last Reviewed/Revised: January 29, 2025      DSM5 Diagnoses: 296.32 (F33.1) Major Depressive Disorder, Recurrent Episode, Moderate _ or 300.02 (F41.1) Generalized Anxiety Disorder  Psychosocial / Contextual  Factors: recent surgery and resulting acute pain, chronic pain, chronic mental health concerns, mobility limitations  PROMIS (reviewed every 90 days):   PROMIS-10 Scores        1/15/2025    12:04 PM 2/11/2025     2:54 PM 2/18/2025     2:19 PM   PROMIS-10 Total Score w/o Sub Scores   PROMIS TOTAL - SUBSCORES 21  21  18        Patient-reported        Referral / Collaboration:  Referral to another professional/service is not indicated at this time..    Anticipated number of session for this episode of care: 9-12 sessions  Anticipation frequency of session: Weekly  Anticipated Duration of each session: 53 or more minutes  Treatment plan will be reviewed in 90 days or when goals have been changed.     MeasurableTreatment Goal(s) related to diagnosis / functional impairment(s)  Goal 1: Patient will decrease anxiety by 75% as evidenced by YUDI-7    I will know I've met my goal when I am at peace with where I'm at.       Objective #A (Patient Action)                          Patient will identify the situations / thoughts that contribute to feeling anxious.  Status: Continued - Date(s):1/29/2025       Intervention(s)  Therapist will process anxiety-proving emotions.     Objective #B  Patient will use at least 3 coping skills for anxiety management in the next 4 weeks.  Status: Continued - Date(s):1/29/2025        Intervention(s)  Therapist will assign homework : coping skills practice to decrease anxiety .     Objective #C  Patient will use cognitive strategies identified in therapy to challenge anxious thoughts.  Status: Continued - Date(s):1/29/2025        Intervention(s)  Therapist will  teach the patient ways to reframe negative core beliefs that contribute to anxiety.     Goal 2 Patient will report pain levels are consistently rated at 2/10 per patient report.   I will know I've met my goal when my pain is less disruptive.      Objective #A (Patient Action)    Patient will identify 5 strategies for managing  pain.  Status: Continued - Date(s):1/29/2025     Intervention(s)  Therapist will teach  strategies to manage pain and assign homework to use 3 strategies daily .    Objective #B  Patient will  engage in physical activity and PT exercises 2-3 times daily .  Status: Continued - Date(s):1/29/2025      Intervention(s)  Therapist will assign homework to engage in at minimum one PT or physical activity daily .    Objective #C  Patient will Identify negative self-talk and behaviors: challenge core beliefs, myths, and actions.  Status: Continued - Date(s):1/29/2025      Intervention(s)  Therapist will teach non-judgmental stance and help patient reframe negative self-talk  .      Goal 3: Patient will report reduction in depressive symptoms as evidenced by PHQ-9 score reduction of 75% or greater.     I will know I've met my goal when I feel like I have purpose in life.      Objective #A (Patient Action)    Status: Continued - Date(s):1/29/2025      Patient will Decrease frequency and intensity of feeling down, depressed, hopeless.    Intervention(s)  Therapist will teach emotional recognition/identification. Therapist will reinforce use of emotion regulation skills .    Objective #B  Patient will use healthy communication when describing his emotions or when setting boundaries with others.     Status: Continued - Date(s):1/29/2025      Intervention(s)  Therapist will teach Therapist will teach emotional regulation skills and interpersonal effectiveness skills to improve quality of communication.     Objective #C  Patient will track and record at least 3 pleasant exchanges with family members such as mother, father .  Status: Continued - Date(s):1/29/2025     Intervention(s)  Therapist will teach about healthy boundaries. Therapist will encourage patient to focus on positive interactions with family and use cope ahead to increase likelihood of pleasant experience as an outcome .    Patient has reviewed and agreed to the above  plan.      Shirley Bartlett PsyD LP  January 29, 2025    Answers submitted by the patient for this visit:  Patient Health Questionnaire (Submitted on 12/3/2024)  If you checked off any problems, how difficult have these problems made it for you to do your work, take care of things at home, or get along with other people?: Very difficult  PHQ9 TOTAL SCORE: 10  Patient Health Questionnaire (G7) (Submitted on 12/3/2024)  YUDI 7 TOTAL SCORE: 9    Answers submitted by the patient for this visit:  Patient Health Questionnaire (Submitted on 12/17/2024)  If you checked off any problems, how difficult have these problems made it for you to do your work, take care of things at home, or get along with other people?: Very difficult  PHQ9 TOTAL SCORE: 15  Patient Health Questionnaire (G7) (Submitted on 12/17/2024)  YUDI 7 TOTAL SCORE: 12    Answers submitted by the patient for this visit:  Patient Health Questionnaire (Submitted on 1/14/2025)  If you checked off any problems, how difficult have these problems made it for you to do your work, take care of things at home, or get along with other people?: Somewhat difficult  PHQ9 TOTAL SCORE: 10    [facility] Safety Plan      Creation Date: 10/21/24       Step 1: Warning signs:    Warning Signs    Start thinking about death more      Step 2: Internal coping strategies - Things I can do to take my mind off my problems without contacting another person:    Strategies    Petting cats    Watching music videos    talk to family member    text friend Tono    hot bubble bath      Step 3: People and social settings that provide distraction:    Name Contact Information    Tono - friend [phone]    Mother [phone]         Step 4: People whom I can ask for help during a crisis:    Name Contact Information    Keyana - sister [phone]      Step 5: Professionals or agencies I can contact during a crisis:    Clinician/Agency Name Phone Emergency Contact    Shirley Tri [phone]     ANU CHENEY        [facility] Emergency Department Emergency Department Address Emergency Department Phone    [facility]       Suicide Prevention Lifeline Phone: Call or Text 475  Crisis Text Line: Text HOME to 614012     Step 6: Making the environment safer (plan for lethal means safety):   Previous medications - dispose of unused or older medications     Optional: What is most important to me and worth living for?:   Niece  Mother  Sister  Cats     [place] Safety Plan. Shanita Lazcano and Ariel Lorenzo. Used with permission of the authors.

## 2025-04-11 ASSESSMENT — PAIN SCALES - PAIN ENJOYMENT GENERAL ACTIVITY SCALE (PEG)
AVG_PAIN_PASTWEEK: 5
INTERFERED_ENJOYMENT_LIFE: 5
PEG_TOTALSCORE: 5
INTERFERED_GENERAL_ACTIVITY: 5

## 2025-04-13 ENCOUNTER — MYC MEDICAL ADVICE (OUTPATIENT)
Dept: FAMILY MEDICINE | Facility: CLINIC | Age: 50
End: 2025-04-13
Payer: MEDICARE

## 2025-04-15 ENCOUNTER — VIRTUAL VISIT (OUTPATIENT)
Dept: PHARMACY | Facility: CLINIC | Age: 50
End: 2025-04-15
Attending: INTERNAL MEDICINE
Payer: MEDICARE

## 2025-04-15 DIAGNOSIS — E11.9 TYPE 2 DIABETES MELLITUS WITHOUT COMPLICATION, WITHOUT LONG-TERM CURRENT USE OF INSULIN (H): Primary | ICD-10-CM

## 2025-04-15 DIAGNOSIS — G43.819 OTHER MIGRAINE WITHOUT STATUS MIGRAINOSUS, INTRACTABLE: ICD-10-CM

## 2025-04-15 RX ORDER — GALCANEZUMAB 120 MG/ML
120 INJECTION, SOLUTION SUBCUTANEOUS
COMMUNITY

## 2025-04-15 ASSESSMENT — ANXIETY QUESTIONNAIRES
7. FEELING AFRAID AS IF SOMETHING AWFUL MIGHT HAPPEN: MORE THAN HALF THE DAYS
7. FEELING AFRAID AS IF SOMETHING AWFUL MIGHT HAPPEN: MORE THAN HALF THE DAYS
2. NOT BEING ABLE TO STOP OR CONTROL WORRYING: MORE THAN HALF THE DAYS
6. BECOMING EASILY ANNOYED OR IRRITABLE: MORE THAN HALF THE DAYS
1. FEELING NERVOUS, ANXIOUS, OR ON EDGE: MORE THAN HALF THE DAYS
8. IF YOU CHECKED OFF ANY PROBLEMS, HOW DIFFICULT HAVE THESE MADE IT FOR YOU TO DO YOUR WORK, TAKE CARE OF THINGS AT HOME, OR GET ALONG WITH OTHER PEOPLE?: VERY DIFFICULT
GAD7 TOTAL SCORE: 12
GAD7 TOTAL SCORE: 12
4. TROUBLE RELAXING: MORE THAN HALF THE DAYS
3. WORRYING TOO MUCH ABOUT DIFFERENT THINGS: MORE THAN HALF THE DAYS
5. BEING SO RESTLESS THAT IT IS HARD TO SIT STILL: NOT AT ALL
IF YOU CHECKED OFF ANY PROBLEMS ON THIS QUESTIONNAIRE, HOW DIFFICULT HAVE THESE PROBLEMS MADE IT FOR YOU TO DO YOUR WORK, TAKE CARE OF THINGS AT HOME, OR GET ALONG WITH OTHER PEOPLE: VERY DIFFICULT
GAD7 TOTAL SCORE: 12

## 2025-04-15 ASSESSMENT — PATIENT HEALTH QUESTIONNAIRE - PHQ9
SUM OF ALL RESPONSES TO PHQ QUESTIONS 1-9: 7
SUM OF ALL RESPONSES TO PHQ QUESTIONS 1-9: 7
10. IF YOU CHECKED OFF ANY PROBLEMS, HOW DIFFICULT HAVE THESE PROBLEMS MADE IT FOR YOU TO DO YOUR WORK, TAKE CARE OF THINGS AT HOME, OR GET ALONG WITH OTHER PEOPLE: SOMEWHAT DIFFICULT

## 2025-04-15 NOTE — PROGRESS NOTES
Medication Therapy Management (MTM) Encounter    ASSESSMENT:                            Medication Adherence/Access: No issues identified.    Diabetes: Patient has tolerated dose increase of Mounjaro, unsure of efficacy for blood sugars or weight since patient has not been monitoring. Given patient's gastroparesis, recommend patient continue on current Mounjaro dose for a longer period of time and may consider dose increase at follow up.     Migraines: Stable and improved with addition of Emgality.     I was only able to assess the above with the patient today. Patient recently met MTM pharmacist Daja Mcdonald to review medications and will continue to follow-up on other disease states and medication review with Daja at upcoming visit.     PLAN:                            Continue on Mounjaro 5mg weekly for an additional 8 weeks    Follow-up: 6/3/25 at 1:30 PM    Endocrine Team & Next Follow-Up:  6/3/25 with Traci  7/7/25 with Dr. Cuenca    SUBJECTIVE/OBJECTIVE:                          Tito Pineda is a 49 year old male called for a follow-up visit.       Reason for visit: Mounjaro follow up.    Allergies/ADRs: Reviewed in chart  Past Medical History: Reviewed in chart  Tobacco: He reports that he has never smoked. He has never been exposed to tobacco smoke. He has never used smokeless tobacco.  Alcohol: Reviewed in chart     Medication Adherence/Access: no issues reported.    Diabetes   Patient diagnosed with type 2 diabetes   Current Medications:  Mounjaro 5mg weekly on Thursdays (dose increase since last MTM visit)- patient notes that they have completed at least 2 doses thus far. Notes that they have noticed more decreased appetite benefits with dose increase. Patient reports some symptoms of nausea off and on usually after eating larger meals.  Declines any worsening in gastroparesis symptoms. Continues to use metoclopramide on and off to help with symptoms, continues to usually use about 5mg twice weekly.    Weight: patient notes that they have not been able to monitor weight since last MTM visit.   Medication History:  - Ozempic: rash occurring on face with higher doses, replaced by Mounjaro     Migraines:  Current Medications:   Divalproex 250mg twice daily   Ubrelvy 100 daily as needed   Riboflavin 400mg daily   Magnesium glycinate 360mg daily   Emgality 120mg monthly (patient had loading dose about 3 weeks ago)  Patient notes that since starting Emgality, their migraines have been better controlled.   Follows with Melvin Samuel APRN CNP at the [facility] for ongoing management     Today's Vitals: There were no vitals taken for this visit.  ----------------    I spent 16 minutes with this patient today. All changes were made via collaborative practice agreement with Abbie Cuenca.     A summary of these recommendations was sent via [employer].    Traci Méndez), PharmD  Endocrine & Diabetes Medication Therapy Management Pharmacist   [address] [place] [address]     Contact information:   To schedule a MTM appointment: [phone]  For questions or concerns, please send a [employer] message or call the clinic at [phone].    For more urgent concerns that do not require 230, please call [phone] after hours/weekends and ask to speak with the Endocrinologist on call.      Telemedicine Visit Details  The patient's medications can be safely assessed via a telemedicine encounter.  Type of service:  Telephone visit  Originating Location (pt. Location): Home    Distant Location (provider location):  Off-site  Start Time:  1:05 PM  End Time: 1:21 PM     Medication Therapy Recommendations  No medication therapy recommendations to display

## 2025-04-15 NOTE — TELEPHONE ENCOUNTER
Completed From Faxed To:[phone] [facility]  Right-Fax reviewed to confirm form was sent without error.    MUSTAPHA QuispeT

## 2025-04-16 ENCOUNTER — OFFICE VISIT (OUTPATIENT)
Dept: PALLIATIVE MEDICINE | Facility: CLINIC | Age: 50
End: 2025-04-16
Attending: NURSE PRACTITIONER
Payer: MEDICARE

## 2025-04-16 ENCOUNTER — VIRTUAL VISIT (OUTPATIENT)
Facility: CLINIC | Age: 50
End: 2025-04-16
Payer: MEDICARE

## 2025-04-16 VITALS — DIASTOLIC BLOOD PRESSURE: 74 MMHG | OXYGEN SATURATION: 97 % | SYSTOLIC BLOOD PRESSURE: 116 MMHG | HEART RATE: 82 BPM

## 2025-04-16 DIAGNOSIS — G57.13 MERALGIA PARESTHETICA OF BOTH LOWER EXTREMITIES: ICD-10-CM

## 2025-04-16 DIAGNOSIS — F33.1 MAJOR DEPRESSIVE DISORDER, RECURRENT EPISODE, MODERATE (H): Primary | ICD-10-CM

## 2025-04-16 DIAGNOSIS — F41.1 GENERALIZED ANXIETY DISORDER: ICD-10-CM

## 2025-04-16 PROCEDURE — 90837 PSYTX W PT 60 MINUTES: CPT | Mod: 95 | Performed by: PSYCHOLOGIST

## 2025-04-16 RX ORDER — TRIAMCINOLONE ACETONIDE 40 MG/ML
40 INJECTION, SUSPENSION INTRA-ARTICULAR; INTRAMUSCULAR ONCE
Status: COMPLETED | OUTPATIENT
Start: 2025-04-16 | End: 2025-04-16

## 2025-04-16 RX ORDER — BUPIVACAINE HYDROCHLORIDE 2.5 MG/ML
10 INJECTION, SOLUTION INFILTRATION; PERINEURAL ONCE
Status: COMPLETED | OUTPATIENT
Start: 2025-04-16 | End: 2025-04-16

## 2025-04-16 RX ADMIN — TRIAMCINOLONE ACETONIDE 40 MG: 40 INJECTION, SUSPENSION INTRA-ARTICULAR; INTRAMUSCULAR at 09:35

## 2025-04-16 RX ADMIN — BUPIVACAINE HYDROCHLORIDE 25 MG: 2.5 INJECTION, SOLUTION INFILTRATION; PERINEURAL at 09:35

## 2025-04-16 ASSESSMENT — PAIN SCALES - GENERAL: PAINLEVEL_OUTOF10: MODERATE PAIN (4)

## 2025-04-16 NOTE — PATIENT INSTRUCTIONS
[facility]    Clinic Number:  [phone]  Call with any questions about your care and for scheduling assistance.   Calls are returned Monday through Friday between 8 AM and 4:30 PM. We usually get back to you within 2 business days depending on the issue/request.    If we are prescribing your medications:  For opioid medication refills, call the clinic or send a [employer] message 7 days in advance.  Please include:  Name of requested medication  Name of the pharmacy.  For non-opioid medications, call your pharmacy directly to request a refill. Please allow 3-4 days to be processed.   Per [place] State Law:  All controlled substance prescriptions must be filled within 30 days of being written.    For those controlled substances allowing refills, pickup must occur within 30 days of last fill.      We believe regular attendance is key to your success in our program!    Any time you are unable to keep your appointment we ask that you call us at least 24 hours in advance to cancel.This will allow us to offer the appointment time to another patient.   Multiple missed appointments may lead to dismissal from the clinic.

## 2025-04-16 NOTE — PATIENT INSTRUCTIONS
"Recommendations from today's MTM visit:                                                      Continue on Mounjaro 5mg weekly for an additional 8 weeks    Follow-up: 6/3/25 at 1:30 PM    Endocrine Team & Next Follow-Up:  6/3/25 with Traci  7/7/25 with Dr. Cuenca    It was great speaking with you today.  I value your experience and would be very thankful for your time in providing feedback in our clinic survey. In the next few days, you may receive an email or text message from [employer] with a link to a survey related to your  clinical pharmacist.\"     To schedule another MTM appointment, please call the clinic directly or you may call the MTM scheduling line at [phone].    My Clinical Pharmacist's contact information:                                                      Please feel free to contact me with any questions or concerns you have.      Traci Méndez), PharmD  Endocrine & Diabetes Medication Therapy Management Pharmacist   [address] [place] [id number]     Contact information:   To schedule a MTM appointment: [phone]  For questions or concerns, please send a [employer] message or call the clinic at [phone].    For more urgent concerns that do not require 824, please call [phone] after hours/weekends and ask to speak with the Endocrinologist on call.     "

## 2025-04-16 NOTE — PROGRESS NOTES
Diagnoses and all orders for this visit:       Bilateral Meralgia paresthetica       Current Procedure: bilateral Lateral femoral cutaneous nerve block  Current Indication (include preoperative):  Alleviation of pain      REASON FOR REFERRAL: Anterior lateral thigh pain and burning  Sonographic guidance will be used to ensure accurate placement.  PATIENT EDUCATION:  Ready to learn with no apparent learning barriers identified.  Learning preferences include listening. Explained diagnosis and treatment plan as well as treatment alternatives. Patient expressed understanding of the content.  Following denial of allergy and review of potential side effects and complications including but not necessarily limited to infection, bleeding, allergic reaction, post-injection flare, local tissue breakdown, injury to soft tissue and/or nerves and seizure, patient indicated their understanding and agreed to proceed. A written consent was obtained and is scanned into the chart. Written and signed consent obtained and is scanned into the chart.  PROCEDURE:  Prior to the procedure,a 12 MHz linear transducer was used to visualize the abdominal/groin musculature to determine the approach for the procedure.  Procedure was carried out using sterile technique including Chloraprep scrub, a sterile transducer cover, and sterile transducer gel. A simple surgical tray was used.  PROCEDURAL PAUSE:  Procedural pause conducted to verify correct patient identity, procedure to be performed, and as applicable, correct side/site, correct patient position, availability of implants, special equipment, or special requirements.  Patient position:  Supine  Transducer type:  12 MHz linear array transducer  Approach:  Medial to lateral parallel to long axis of transducer  Local Anesthesia:  25 gauge 2.5 inch needle was used to anesthetize the skin, subcutaneous tissue  with 5 ml of 1% Lidocaine  Injection: After confirming needle tip position, syringe was  replaced with one containing 1 ml of 0.25% Bupivacaine which was injected and seen hydodissecting the sartorius and tensor fascia florida bilaterally.  Needle was removed bandage placed over the wound.  AFTERCARE:  Patient tolerated the procedure without complication. After a short observation period, the patient was discharged under their own power and in excellent condition.  Pain noted to be a 8/10 before completion of the procedure and 0/10 after completion of the procedure.      Injection solution contained:  2ml of 0.25% bupivacaine and 40mg of Loni Barber(fellow)MD Bubba Montgomery MD  [facility]

## 2025-04-16 NOTE — PROGRESS NOTES
[facility]                                     Progress Note    Patient Name: Joel Pineda  Date: April 16, 2025            Service Type: Individual      Session Start Time: 2:01 PM Session End Time: 2:57 PM     Session Length: 56 minutes    Session #: 19    Attendees: Client attended alone    Service Modality:  Video Visit:      Provider verified identity through the following two step process.  Patient provided:  Patient photo and Patient is known previously to provider    Telemedicine Visit: The patient's condition can be safely assessed and treated via synchronous audio and visual telemedicine encounter.      Reason for Telemedicine Visit: Patient convenience (e.g. access to timely appointments / distance to available provider)    Originating Site (Patient Location): Patient's other parked car at [facility] parking ramp    Distant Site (Provider Location): Provider Remote Setting- Home Office    Consent:  The patient/guardian has verbally consented to: the potential risks and benefits of telemedicine (video visit) versus in person care; bill my insurance or make self-payment for services provided; and responsibility for payment of non-covered services.     Patient would like the video invitation sent by:  Text to cell phone: [phone]    Mode of Communication:  Video Conference via [facility]    Distant Location (Provider):  Off-site    As the provider I attest to compliance with applicable laws and regulations related to telemedicine.    DATA  Interactive Complexity: Yes, visit entailed Interactive Complexity evidenced by: Patient's presenting concerns require more intensive intervention than could be completed within the usual service. Provider used clinical judgment in determining the necessary length for the session to benefit the patient's needs. Teaching/review of DBT skills.    Crisis: No        Progress Since Last Session (Related to Symptoms / Goals / Homework):   Symptoms: No  change Tito continues to report low mood, ongoing anxiety and panic.    Homework: Achieved / completed to satisfaction - using DBT skills for emotion regulation.      Episode of Care Goals: Satisfactory progress - CONTEMPLATION (Considering change and yet undecided); Intervened by assessing the negative and positive thinking (ambivalence) about behavior change     Current / Ongoing Stressors and Concerns:   Tito shared about past week's events - his father left a book on forgiveness for him to find on his kitchen counter. He said it would have been 'too logical' to talk directly to dad, they both ignored it, however he acknowledges it would be helpful to discuss. His father fell while visiting, they struggled to get him back up. He continues to work on direct communication skills, and recognizes how emotional intensity can make this more challenging. He has been thinking about whether her could return to the workforce and not have to qualify for SSDI anymore. Discussed what this might entail or look like - he admits this is more related to worry about unknowns related to political environment and threats to SSDI. Discussed avoiding impulsive decision regarding return to work, especially given his pain experience and that he is not currently being asked to return to work by [employer]. Validated his frustration regarding his mother's care, and using radical acceptance that he is not in charge of his mother's care. He expresses wish to be 'more covert' about his feelings; he describes purposely choosing to not purchase a birthday card. Discussed communication styles and what it means to communicate assertively, and his tendency to communicate in a passive or passive aggressive manner. Patient and provider reviewed use of I statements and wise mind. Started conversation about healthy attachment.      Treatment Objective(s) Addressed in This Session:   use cognitive strategies identified in therapy to challenge anxious  thoughts  state understanding of stressors and relationship to physical symptoms  Decrease frequency and intensity of feeling down, depressed, hopeless  Identify negative self-talk and behaviors: challenge core beliefs, myths, and actions  reduce their emotional vulnerability by 40% during the Emotion Regulation Module (6-8 weeks)      Intervention:      CBT: cognitive challenging, restructuring, reframing Behavioral activation techniques, grounding strategies  PCT: supportive autonomy, unconditional positive regard, empathic reflection, active listening  DBT: self-soothing with the 5 senses, TIP Skill, IMPROVE the moment, assertive communication/DEAR MAN    Assessments completed prior to visit:  The following assessments were completed by patient for this visit:  The following assessments were completed by patient for this visit:  PHQ9:       1/1/2025     3:48 PM 1/14/2025     9:37 AM 1/28/2025     8:32 AM 2/18/2025    10:29 AM 3/4/2025     2:20 PM 3/12/2025    11:35 AM 4/15/2025     2:32 PM   PHQ-9 SCORE   PHQ-9 Total Score MyChart 8 (Mild depression) 10 (Moderate depression) 10 (Moderate depression) 10 (Moderate depression) 6 (Mild depression) 8 (Mild depression) 7 (Mild depression)   PHQ-9 Total Score 8  10  10  10  6  8  7        Patient-reported     GAD7:       10/20/2024     2:25 PM 12/3/2024    12:14 PM 12/17/2024     2:51 PM 1/1/2025     3:50 PM 2/18/2025     2:18 PM 3/12/2025    11:39 AM 4/15/2025     2:35 PM   YUDI-7 SCORE   Total Score 7 (mild anxiety) 9 (mild anxiety) 12 (moderate anxiety) 8 (mild anxiety) 10 (moderate anxiety) 15 (severe anxiety) 12 (moderate anxiety)   Total Score 7 9  12  8  10  15  12        Patient-reported         ASSESSMENT: Current Emotional / Mental Status (status of significant symptoms):   Risk status (Self / Other harm or suicidal ideation)   Patient reports the following current fears or concerns for personal safety: Ongoing passive thoughts of suicide with absence of plan  or intent and agreed to follow his previously developed [facility] Safety Plan.   Patient denies current or recent suicidal ideation or behaviors.   Patient denies current or recent homicidal ideation or behaviors.   Patient denies current or recent self injurious behavior or ideation.   Patient denies other safety concerns.   Patient reports there has been no change in risk factors since their last session.     Patient reports there has been no change in protective factors since their last session.     A safety and risk management plan has been developed including: Patient consented to co-developed safety plan on 10/21/2024.  Safety and risk management plan was reviewed.   Patient agreed to use safety plan should any safety concerns arise.  A copy was made available to the patient.     Appearance:   Appropriate    Eye Contact:   Fair    Psychomotor Behavior: Normal    Attitude:   Cooperative    Orientation:   All   Speech    Rate / Production: Normal/ Responsive    Volume:  Normal    Mood:    Anxious  Depressed  Irritable  Sad    Affect:    Subdued  Worrisome    Thought Content:  Clear    Thought Form:  Coherent    Insight:    Fair      Medication Review:   Changes to psychiatric medications, see updated Medication List in EPIC.       Medication Compliance:   Yes - may not take some PRNs as often as he could     Changes in Health Issues:   None reported     Chemical Use Review:   Substance Use: Chemical use reviewed, no active concerns identified      Tobacco Use: No current tobacco use.      Diagnosis:  296.32 (F33.1) Major Depressive Disorder, Recurrent Episode, Moderate _.  300.02 (F41.1) Generalized Anxiety Disorder.    Collateral Reports Completed:   Not Applicable    PLAN: (Patient Tasks / Therapist Tasks / Other)    Patient to continue to use non-judgmental stance and cope ahead skills.     Patient and provider will continue practicing how to reframe automatic thinking from negative situations.      Patient will practice radical self-compassion techniques to decrease shame.     Patient to engage in basic self-cares and focus on present moment.     Shirley Bartlett PsyD LP                                                      ______________________________________________________________________    Individual Treatment Plan    Patient's Name: Joel Pineda  YOB: 1975    Date of Creation: October 30, 2024   Date Treatment Plan Last Reviewed/Revised: January 29, 2025      DSM5 Diagnoses: 296.32 (F33.1) Major Depressive Disorder, Recurrent Episode, Moderate _ or 300.02 (F41.1) Generalized Anxiety Disorder  Psychosocial / Contextual Factors: recent surgery and resulting acute pain, chronic pain, chronic mental health concerns, mobility limitations  PROMIS (reviewed every 90 days):   PROMIS-10 Scores        1/15/2025    12:04 PM 2/11/2025     2:54 PM 2/18/2025     2:19 PM   PROMIS-10 Total Score w/o Sub Scores   PROMIS TOTAL - SUBSCORES 21  21  18        Patient-reported        Referral / Collaboration:  Referral to another professional/service is not indicated at this time..    Anticipated number of session for this episode of care: 9-12 sessions  Anticipation frequency of session: Weekly  Anticipated Duration of each session: 53 or more minutes  Treatment plan will be reviewed in 90 days or when goals have been changed.     MeasurableTreatment Goal(s) related to diagnosis / functional impairment(s)  Goal 1: Patient will decrease anxiety by 75% as evidenced by YUDI-7    I will know I've met my goal when I am at peace with where I'm at.       Objective #A (Patient Action)                          Patient will identify the situations / thoughts that contribute to feeling anxious.  Status: Continued - Date(s):1/29/2025       Intervention(s)  Therapist will process anxiety-proving emotions.     Objective #B  Patient will use at least 3 coping skills for anxiety management in the next 4 weeks.  Status:  Continued - Date(s):1/29/2025        Intervention(s)  Therapist will assign homework : coping skills practice to decrease anxiety .     Objective #C  Patient will use cognitive strategies identified in therapy to challenge anxious thoughts.  Status: Continued - Date(s):1/29/2025        Intervention(s)  Therapist will  teach the patient ways to reframe negative core beliefs that contribute to anxiety.     Goal 2 Patient will report pain levels are consistently rated at 2/10 per patient report.   I will know I've met my goal when my pain is less disruptive.      Objective #A (Patient Action)    Patient will identify 5 strategies for managing pain.  Status: Continued - Date(s):1/29/2025     Intervention(s)  Therapist will teach  strategies to manage pain and assign homework to use 3 strategies daily .    Objective #B  Patient will  engage in physical activity and PT exercises 2-3 times daily .  Status: Continued - Date(s):1/29/2025      Intervention(s)  Therapist will assign homework to engage in at minimum one PT or physical activity daily .    Objective #C  Patient will Identify negative self-talk and behaviors: challenge core beliefs, myths, and actions.  Status: Continued - Date(s):1/29/2025      Intervention(s)  Therapist will teach non-judgmental stance and help patient reframe negative self-talk  .      Goal 3: Patient will report reduction in depressive symptoms as evidenced by PHQ-9 score reduction of 75% or greater.     I will know I've met my goal when I feel like I have purpose in life.      Objective #A (Patient Action)    Status: Continued - Date(s):1/29/2025      Patient will Decrease frequency and intensity of feeling down, depressed, hopeless.    Intervention(s)  Therapist will teach emotional recognition/identification. Therapist will reinforce use of emotion regulation skills .    Objective #B  Patient will use healthy communication when describing his emotions or when setting boundaries with others.      Status: Continued - Date(s):1/29/2025      Intervention(s)  Therapist will teach Therapist will teach emotional regulation skills and interpersonal effectiveness skills to improve quality of communication.     Objective #C  Patient will track and record at least 3 pleasant exchanges with family members such as mother, father .  Status: Continued - Date(s):1/29/2025     Intervention(s)  Therapist will teach about healthy boundaries. Therapist will encourage patient to focus on positive interactions with family and use cope ahead to increase likelihood of pleasant experience as an outcome .    Patient has reviewed and agreed to the above plan.      Shirley Bartlett PsyD LP  January 29, 2025    Answers submitted by the patient for this visit:  Patient Health Questionnaire (Submitted on 12/3/2024)  If you checked off any problems, how difficult have these problems made it for you to do your work, take care of things at home, or get along with other people?: Very difficult  PHQ9 TOTAL SCORE: 10  Patient Health Questionnaire (G7) (Submitted on 12/3/2024)  YUDI 7 TOTAL SCORE: 9    Answers submitted by the patient for this visit:  Patient Health Questionnaire (Submitted on 12/17/2024)  If you checked off any problems, how difficult have these problems made it for you to do your work, take care of things at home, or get along with other people?: Very difficult  PHQ9 TOTAL SCORE: 15  Patient Health Questionnaire (G7) (Submitted on 12/17/2024)  YUDI 7 TOTAL SCORE: 12    Answers submitted by the patient for this visit:  Patient Health Questionnaire (Submitted on 1/14/2025)  If you checked off any problems, how difficult have these problems made it for you to do your work, take care of things at home, or get along with other people?: Somewhat difficult  PHQ9 TOTAL SCORE: 10    [facility] Safety Plan      Creation Date: 10/21/24       Step 1: Warning signs:    Warning Signs    Start thinking about death more      Step 2:  Internal coping strategies - Things I can do to take my mind off my problems without contacting another person:    Strategies    Petting cats    Watching music videos    talk to family member    text friend Tono    hot bubble bath      Step 3: People and social settings that provide distraction:    Name Contact Information    Tono - friend [phone]    Mother [phone]         Step 4: People whom I can ask for help during a crisis:    Name Contact Information    Ekyana - sister [phone]      Step 5: Professionals or agencies I can contact during a crisis:    Clinician/Agency Name Phone Emergency Contact    Shirley Bartlett [phone]    ANU CHENEY        [facility] Emergency Department Emergency Department Address Emergency Department Phone    [facility]       Suicide Prevention Lifeline Phone: Call or Text 754  Crisis Text Line: Text HOME to 465027     Step 6: Making the environment safer (plan for lethal means safety):   Previous medications - dispose of unused or older medications     Optional: What is most important to me and worth living for?:   Niece  Mother  Sister  Cats     [facility] Safety Plan. Shanita Lazcano and Ariel Lorenzo. Used with permission of the authors.

## 2025-04-17 NOTE — PROGRESS NOTES
Pre-screening Questions for Clinic Based Injections:    Botox- no questions needed  *of note, it is possible to do occipital nerve blocks and trigger point injections without steroid, so if they feel they need to schedule, we can do that and leave out steroid.    Location:  [place]: Ultrasound appointments NOT available at this time  [place]: Ultrasound appointments NOT available at this time  [place]:  Ultrasound appointments NOT available at this time  [place]: Do not double book any Ultrasound Procedures  [place]:  Please send to RN pool after scheduling to verify U/S availability   IF SCHEDULING IN [place] PLEASE SCHEDULE AT LEAST 7-10 BUSINESS DAYS OUT SO A PA CAN BE OBTAINED    Does patient have an active infection or treated for one within the past week? No  (If YES, do NOT schedule and route to RN)     Is patient currently taking any antibiotics?  No  (If YES, do NOT schedule and route to RN)  For patients on chronic, preventative or prophylactic antibiotics, procedures can be scheduled.  Valentina Capps-antibiotic course must have been completed for 4 days    Is patient taking any aspirin products? No   No need to hold non-aspirin anticoagulants.   If taking > 325mg/day of aspirin, limit aspirin to 81-325mg/day x 1 week.   No hold required day of procedure.    Has the patient had a flu shot or any other vaccinations within 7 days before or after the procedure.  No     Have you recently had a COVID vaccine or have plans to get it in the near future? No    If yes, explain that for the vaccine to work best they need to:     wait 1 week before and 1 week after getting Vaccine #1  wait 1 week before and 2 weeks after getting Vaccine #2  If patient has concerns about the timing, send to RN pool

## 2025-04-19 DIAGNOSIS — I10 ESSENTIAL HYPERTENSION WITH GOAL BLOOD PRESSURE LESS THAN 140/90: ICD-10-CM

## 2025-04-19 DIAGNOSIS — M51.362 DEGENERATION OF INTERVERTEBRAL DISC OF LUMBAR REGION WITH DISCOGENIC BACK PAIN AND LOWER EXTREMITY PAIN: ICD-10-CM

## 2025-04-21 ENCOUNTER — TELEPHONE (OUTPATIENT)
Dept: FAMILY MEDICINE | Facility: CLINIC | Age: 50
End: 2025-04-21
Payer: MEDICARE

## 2025-04-21 RX ORDER — METHOCARBAMOL 500 MG/1
TABLET, FILM COATED ORAL
Qty: 240 TABLET | Refills: 0 | Status: SHIPPED | OUTPATIENT
Start: 2025-04-21

## 2025-04-21 RX ORDER — METOPROLOL SUCCINATE 100 MG/1
TABLET, EXTENDED RELEASE ORAL
Qty: 90 TABLET | Refills: 0 | OUTPATIENT
Start: 2025-04-21

## 2025-04-21 NOTE — TELEPHONE ENCOUNTER
"Patient calling today stating that he was diagnosed with SVT about a year ago. RN does not see that patient was ever diagnosed with this in the past.     He reports that yesterday he could feel his heart was in \"SVT rapid fire\". He said he had these episodes several times yesterday and they last \"maybe 5-10 seconds each but feel much longer\". Today he has had some burning feeling in his chest off and on and knows it's not GERD.  Currently the patient is completely asymptomatic and feels well. Denies when these episodes occur that he has chest pain, shortness of breath, or dizziness/ lightheadedness. Feels more tired than usual. The only new medication is that he started Emgality for migraines about 1 month ago. No other new medications or medication changes.     Since patient is asymptomatic currently cannot run a protocol. Advised patient that we should get appointment for him to see PCP asap to discuss symptoms. Made appointment for patient to see PCP tomorrow morning:    Next 5 appointments (look out 90 days)      Apr 22, 2025 11:00 AM  (Arrive by 10:40 AM)  Provider Visit with Ksenia Lyles MD  [facility] ([facility] ) [address] [place] [id number] [phone]     Advised patient that if he has any of those symptoms before his appointment tomorrow morning, to go immediately to the ER for evaluation. Patient states he agrees with this.       Gayle VIEIRA, RN         "

## 2025-04-22 ENCOUNTER — OFFICE VISIT (OUTPATIENT)
Dept: FAMILY MEDICINE | Facility: CLINIC | Age: 50
End: 2025-04-22
Payer: MEDICARE

## 2025-04-22 VITALS
BODY MASS INDEX: 34.94 KG/M2 | SYSTOLIC BLOOD PRESSURE: 119 MMHG | HEART RATE: 94 BPM | OXYGEN SATURATION: 99 % | WEIGHT: 286.9 LBS | TEMPERATURE: 98.4 F | HEIGHT: 76 IN | RESPIRATION RATE: 20 BRPM | DIASTOLIC BLOOD PRESSURE: 85 MMHG

## 2025-04-22 DIAGNOSIS — Z86.79 H/O SUPRAVENTRICULAR TACHYCARDIA: ICD-10-CM

## 2025-04-22 DIAGNOSIS — R00.0 TACHYCARDIA: Primary | ICD-10-CM

## 2025-04-22 DIAGNOSIS — F41.9 ANXIETY: ICD-10-CM

## 2025-04-22 DIAGNOSIS — I10 HYPERTENSION GOAL BP (BLOOD PRESSURE) < 140/90: ICD-10-CM

## 2025-04-22 DIAGNOSIS — G90.1 DYSAUTONOMIA (H): ICD-10-CM

## 2025-04-22 PROCEDURE — 3079F DIAST BP 80-89 MM HG: CPT | Performed by: FAMILY MEDICINE

## 2025-04-22 PROCEDURE — G2211 COMPLEX E/M VISIT ADD ON: HCPCS | Performed by: FAMILY MEDICINE

## 2025-04-22 PROCEDURE — 93000 ELECTROCARDIOGRAM COMPLETE: CPT | Performed by: FAMILY MEDICINE

## 2025-04-22 PROCEDURE — 99214 OFFICE O/P EST MOD 30 MIN: CPT | Performed by: FAMILY MEDICINE

## 2025-04-22 PROCEDURE — 3074F SYST BP LT 130 MM HG: CPT | Performed by: FAMILY MEDICINE

## 2025-04-22 PROCEDURE — 1125F AMNT PAIN NOTED PAIN PRSNT: CPT | Performed by: FAMILY MEDICINE

## 2025-04-22 ASSESSMENT — PAIN SCALES - GENERAL: PAINLEVEL_OUTOF10: MODERATE PAIN (4)

## 2025-04-22 NOTE — PROGRESS NOTES
"  Assessment & Plan     Tachycardia  Patient well-known to me.  Has a history of SVT and I reviewed previous Holter monitors with mixed findings.  He was in his usual state of health until last couple days where he started noticing some short bouts of tachycardia lasting perhaps 5 to 10 seconds.  Not really a lot of other symptoms associated with it.  Has been under a lot of stress with an ill mother.  Had a couple of medication changes including the switch to Emgality and Latuda but I do not think those necessarily correspond with his symptoms.  Currently asymptomatic and auscultation normal.  EKG shows normal sinus rhythm.  Already on 300 mg daily of metoprolol.  So we discussed how to approach this.  1 option would be to consider setting up further testing with a monitor.  Patient would like to defer that for now.  So I suggested he keep an eye on this and if increasing in frequency or duration or that type of thing we could reconsider.  Discussed when would be the time to present to more emergent care and that would have to do with prolonged symptoms especially if running into some pain or shortness of breath.  So he will monitor things for now and keep in contact with me.  - EKG 12-lead complete w/read - Clinics    H/O supraventricular tachycardia  As above    Hypertension goal BP (blood pressure) < 140/90  Well-controlled and currently on beta-blockers    Dysautonomia (H)  Contributory to some of his issue perhaps    Anxiety  Working on reducing stress and working with the psychiatry team                Radha Francis is a 49 year old, presenting for the following health issues:  Heart Problem (Having SVT episodes again  Began this time on Easter Sunday and one episode since.  Sunday \"rapid fire, with pin pointed burning pain over stereum\"/)        4/22/2025    10:35 AM   Additional Questions   Roomed by Carolina MARR   Accompanied by self     History of Present Illness       Reason for visit:  SVT  Symptom " "onset:  1-3 days ago  Symptoms include:  Bursts of Palpitations, fatigue, burning chest pain  Symptom intensity:  Moderate  Symptom progression:  Improving  Had these symptoms before:  Yes  Has tried/received treatment for these symptoms:  No   He is taking medications regularly.            Here today for the above.      Review of Systems  Constitutional, HEENT, cardiovascular, pulmonary, gi and gu systems are negative, except as otherwise noted.      Objective    /85 (BP Location: Right arm, Patient Position: Sitting, Cuff Size: Adult Large)   Pulse 94   Temp 98.4  F (36.9  C) (Oral)   Resp 20   Ht 1.93 m (6' 4\")   Wt 130.1 kg (286 lb 14.4 oz)   SpO2 99%   BMI 34.92 kg/m    Body mass index is 34.92 kg/m .  Physical Exam   Alert, pleasant, upbeat, and in no apparent discomfort.  S1 and S2 normal, no murmurs, clicks, gallops or rubs. Regular rate and rhythm. Chest is clear; no wheezes or rales. No edema or JVD.  Past labs reviewed with the patient.     EKG - Reviewed and interpreted by me appears normal, NSR, normal axis, normal intervals, no acute ST/T changes c/w ischemia, no LVH by voltage criteria, unchanged from previous tracings    The longitudinal plan of care for the diagnosis(es)/condition(s) as documented were addressed during this visit. Due to the added complexity in care, I will continue to support Tito in the subsequent management and with ongoing continuity of care.        Signed Electronically by: Ksenia Lyles MD    "

## 2025-04-23 ENCOUNTER — MYC REFILL (OUTPATIENT)
Dept: PALLIATIVE MEDICINE | Facility: OTHER | Age: 50
End: 2025-04-23
Payer: MEDICARE

## 2025-04-23 ENCOUNTER — VIRTUAL VISIT (OUTPATIENT)
Facility: CLINIC | Age: 50
End: 2025-04-23
Payer: MEDICARE

## 2025-04-23 DIAGNOSIS — G43.111 INTRACTABLE MIGRAINE WITH AURA WITH STATUS MIGRAINOSUS: ICD-10-CM

## 2025-04-23 DIAGNOSIS — G89.29 CHRONIC INTRACTABLE PAIN: ICD-10-CM

## 2025-04-23 DIAGNOSIS — G62.9 PERIPHERAL POLYNEUROPATHY: ICD-10-CM

## 2025-04-23 DIAGNOSIS — M45.8 ANKYLOSING SPONDYLITIS OF SACRAL REGION (H): ICD-10-CM

## 2025-04-23 DIAGNOSIS — F33.1 MAJOR DEPRESSIVE DISORDER, RECURRENT EPISODE, MODERATE (H): Primary | ICD-10-CM

## 2025-04-23 DIAGNOSIS — F41.1 GENERALIZED ANXIETY DISORDER: ICD-10-CM

## 2025-04-23 DIAGNOSIS — M47.816 LUMBAR FACET ARTHROPATHY: ICD-10-CM

## 2025-04-23 PROCEDURE — 90837 PSYTX W PT 60 MINUTES: CPT | Mod: 95 | Performed by: PSYCHOLOGIST

## 2025-04-23 RX ORDER — OXYCODONE HYDROCHLORIDE 5 MG/1
5 TABLET ORAL EVERY 6 HOURS PRN
Qty: 60 TABLET | Refills: 0 | Status: SHIPPED | OUTPATIENT
Start: 2025-04-23

## 2025-04-23 NOTE — TELEPHONE ENCOUNTER
Medication refill information reviewed.     Due date for oxyCODONE (ROXICODONE) 5 MG tablet  is 05/01/25     Prescriptions prepped for review.     Will route to provider.

## 2025-04-23 NOTE — TELEPHONE ENCOUNTER
Refills have been requested for the following medications:         oxyCODONE (ROXICODONE) 5 MG tablet [Stacia Jack]     Preferred pharmacy: [id number] [id number] [address]

## 2025-04-23 NOTE — PROGRESS NOTES
[facility]                                     Progress Note    Patient Name: Joel Pineda  Date: April 23, 2025            Service Type: Individual      Session Start Time: 2:01 PM Session End Time: 3:00 PM     Session Length:  59 minutes    Session #: 21    Attendees: Client attended alone    Service Modality:  Video Visit:      Provider verified identity through the following two step process.  Patient provided:  Patient photo and Patient is known previously to provider    Telemedicine Visit: The patient's condition can be safely assessed and treated via synchronous audio and visual telemedicine encounter.      Reason for Telemedicine Visit: Patient convenience (e.g. access to timely appointments / distance to available provider)    Originating Site (Patient Location): Patient's other parked car at [facility] parking ramp    Distant Site (Provider Location): Provider Remote Setting- Home Office    Consent:  The patient/guardian has verbally consented to: the potential risks and benefits of telemedicine (video visit) versus in person care; bill my insurance or make self-payment for services provided; and responsibility for payment of non-covered services.     Patient would like the video invitation sent by:  Text to cell phone: [phone]    Mode of Communication:  Video Conference via [facility]    Distant Location (Provider):  Off-site    As the provider I attest to compliance with applicable laws and regulations related to telemedicine.    DATA  Interactive Complexity: Yes, visit entailed Interactive Complexity evidenced by: Patient's presenting concerns require more intensive intervention than could be completed within the usual service. Provider used clinical judgment in determining the necessary length for the session to benefit the patient's needs. Teaching/review of DBT skills.    Crisis: No        Progress Since Last Session (Related to Symptoms / Goals / Homework):   Symptoms: No  change Tito continues to report low mood, ongoing anxiety and panic.    Homework: Achieved / completed to satisfaction - using DBT skills for emotion regulation.      Episode of Care Goals: Satisfactory progress - CONTEMPLATION (Considering change and yet undecided); Intervened by assessing the negative and positive thinking (ambivalence) about behavior change     Current / Ongoing Stressors and Concerns:   Tito reports concerning SVT/heart rate on Easter, was assessed by PCP yesterday who agreed to plan of watching and seeking more emergency care if he has other concerning symptoms. He recognizes how stress seems to negatively impact his health. He shares about distressing event on Sunday where his mother received a call to seek emergency care 'due to heart event' likely due to her pace maker sending some kind of misinformation to monitoring service. He worries his mother's cognitive processing is reduced due to stroke. Discussed not 'borrowing trouble' by being stuck in worry about the future, especially if it prevents focus on present moment. Patient and provider used cope ahead regarding his mother's health. Tito reports the neighbors have been giving him the cold shoulder, sharing that in the past they have given him treats on holidays, expressed worry when seeing him using a walker after his surgery. He reports concern that he did not receive a response when waving or perceived that wife was 'walking fast' towards her door - patient and provider explored the facts and whether his perception was mistaken. Explored some of his internal shameful thoughts related to his sexuality, which he reports he has 'turned off' as a result of others having rejected this aspect of himself.     Treatment Objective(s) Addressed in This Session:   use cognitive strategies identified in therapy to challenge anxious thoughts  state understanding of stressors and relationship to physical symptoms  Decrease frequency and  intensity of feeling down, depressed, hopeless  Identify negative self-talk and behaviors: challenge core beliefs, myths, and actions  reduce their emotional vulnerability by 40% during the Emotion Regulation Module (6-8 weeks)      Intervention:      CBT: cognitive challenging, restructuring, reframing Behavioral activation techniques, grounding strategies  PCT: supportive autonomy, unconditional positive regard, empathic reflection, active listening  DBT: self-soothing with the 5 senses, TIP Skill, IMPROVE the moment, assertive communication/DEAR MAN    Assessments completed prior to visit:  The following assessments were completed by patient for this visit:  The following assessments were completed by patient for this visit:  PHQ9:       1/14/2025     9:37 AM 1/28/2025     8:32 AM 2/18/2025    10:29 AM 3/4/2025     2:20 PM 3/12/2025    11:35 AM 4/15/2025     2:32 PM 4/22/2025     2:28 PM   PHQ-9 SCORE   PHQ-9 Total Score MyChart 10 (Moderate depression) 10 (Moderate depression) 10 (Moderate depression) 6 (Mild depression) 8 (Mild depression) 7 (Mild depression) 10 (Moderate depression)   PHQ-9 Total Score 10  10  10  6  8  7  10        Patient-reported     GAD7:       10/20/2024     2:25 PM 12/3/2024    12:14 PM 12/17/2024     2:51 PM 1/1/2025     3:50 PM 2/18/2025     2:18 PM 3/12/2025    11:39 AM 4/15/2025     2:35 PM   YUDI-7 SCORE   Total Score 7 (mild anxiety) 9 (mild anxiety) 12 (moderate anxiety) 8 (mild anxiety) 10 (moderate anxiety) 15 (severe anxiety) 12 (moderate anxiety)   Total Score 7 9  12  8  10  15  12        Patient-reported         ASSESSMENT: Current Emotional / Mental Status (status of significant symptoms):   Risk status (Self / Other harm or suicidal ideation)   Patient reports the following current fears or concerns for personal safety: Ongoing passive thoughts of suicide with absence of plan or intent and agreed to follow his previously developed [employer] Safety Plan.   Patient denies  current or recent suicidal ideation or behaviors.   Patient denies current or recent homicidal ideation or behaviors.   Patient denies current or recent self injurious behavior or ideation.   Patient denies other safety concerns.   Patient reports there has been no change in risk factors since their last session.     Patient reports there has been no change in protective factors since their last session.     A safety and risk management plan has been developed including: Patient consented to co-developed safety plan on 10/21/2024.  Safety and risk management plan was reviewed.   Patient agreed to use safety plan should any safety concerns arise.  A copy was made available to the patient.     Appearance:   Appropriate    Eye Contact:   Fair    Psychomotor Behavior: Normal    Attitude:   Cooperative    Orientation:   All   Speech    Rate / Production: Normal/ Responsive    Volume:  Normal    Mood:    Anxious  Depressed  Sad    Affect:    Subdued  Worrisome    Thought Content:  Clear    Thought Form:  Coherent    Insight:    Fair      Medication Review:   Changes to psychiatric medications, see updated Medication List in EPIC.       Medication Compliance:   Yes - may not take some PRNs as often as he could     Changes in Health Issues:   None reported     Chemical Use Review:   Substance Use: Chemical use reviewed, no active concerns identified      Tobacco Use: No current tobacco use.      Diagnosis:  296.32 (F33.1) Major Depressive Disorder, Recurrent Episode, Moderate _.  300.02 (F41.1) Generalized Anxiety Disorder.    Collateral Reports Completed:   Not Applicable    PLAN: (Patient Tasks / Therapist Tasks / Other)    Patient to continue to use non-judgmental stance and cope ahead skills.     Patient and provider will continue practicing how to reframe automatic thinking from negative situations.     Patient will practice radical self-compassion techniques to decrease shame.     Patient to engage in basic self-cares  and focus on present moment.     Shirleyjoseph Bartlett, Ha LP                                                      ______________________________________________________________________    Individual Treatment Plan    Patient's Name: Joel Pineda  YOB: 1975    Date of Creation: October 30, 2024   Date Treatment Plan Last Reviewed/Revised: January 29, 2025      DSM5 Diagnoses: 296.32 (F33.1) Major Depressive Disorder, Recurrent Episode, Moderate _ or 300.02 (F41.1) Generalized Anxiety Disorder  Psychosocial / Contextual Factors: recent surgery and resulting acute pain, chronic pain, chronic mental health concerns, mobility limitations  PROMIS (reviewed every 90 days):   PROMIS-10 Scores        1/15/2025    12:04 PM 2/11/2025     2:54 PM 2/18/2025     2:19 PM   PROMIS-10 Total Score w/o Sub Scores   PROMIS TOTAL - SUBSCORES 21  21  18        Patient-reported        Referral / Collaboration:  Referral to another professional/service is not indicated at this time..    Anticipated number of session for this episode of care: 9-12 sessions  Anticipation frequency of session: Weekly  Anticipated Duration of each session: 53 or more minutes  Treatment plan will be reviewed in 90 days or when goals have been changed.     MeasurableTreatment Goal(s) related to diagnosis / functional impairment(s)  Goal 1: Patient will decrease anxiety by 75% as evidenced by YUDI-7    I will know I've met my goal when I am at peace with where I'm at.       Objective #A (Patient Action)                          Patient will identify the situations / thoughts that contribute to feeling anxious.  Status: Continued - Date(s):1/29/2025       Intervention(s)  Therapist will process anxiety-proving emotions.     Objective #B  Patient will use at least 3 coping skills for anxiety management in the next 4 weeks.  Status: Continued - Date(s):1/29/2025        Intervention(s)  Therapist will assign homework : coping skills practice to decrease  anxiety .     Objective #C  Patient will use cognitive strategies identified in therapy to challenge anxious thoughts.  Status: Continued - Date(s):1/29/2025        Intervention(s)  Therapist will  teach the patient ways to reframe negative core beliefs that contribute to anxiety.     Goal 2 Patient will report pain levels are consistently rated at 2/10 per patient report.   I will know I've met my goal when my pain is less disruptive.      Objective #A (Patient Action)    Patient will identify 5 strategies for managing pain.  Status: Continued - Date(s):1/29/2025     Intervention(s)  Therapist will teach  strategies to manage pain and assign homework to use 3 strategies daily .    Objective #B  Patient will  engage in physical activity and PT exercises 2-3 times daily .  Status: Continued - Date(s):1/29/2025      Intervention(s)  Therapist will assign homework to engage in at minimum one PT or physical activity daily .    Objective #C  Patient will Identify negative self-talk and behaviors: challenge core beliefs, myths, and actions.  Status: Continued - Date(s):1/29/2025      Intervention(s)  Therapist will teach non-judgmental stance and help patient reframe negative self-talk  .      Goal 3: Patient will report reduction in depressive symptoms as evidenced by PHQ-9 score reduction of 75% or greater.     I will know I've met my goal when I feel like I have purpose in life.      Objective #A (Patient Action)    Status: Continued - Date(s):1/29/2025      Patient will Decrease frequency and intensity of feeling down, depressed, hopeless.    Intervention(s)  Therapist will teach emotional recognition/identification. Therapist will reinforce use of emotion regulation skills .    Objective #B  Patient will use healthy communication when describing his emotions or when setting boundaries with others.     Status: Continued - Date(s):1/29/2025      Intervention(s)  Therapist will teach Therapist will teach emotional  regulation skills and interpersonal effectiveness skills to improve quality of communication.     Objective #C  Patient will track and record at least 3 pleasant exchanges with family members such as mother, father .  Status: Continued - Date(s):1/29/2025     Intervention(s)  Therapist will teach about healthy boundaries. Therapist will encourage patient to focus on positive interactions with family and use cope ahead to increase likelihood of pleasant experience as an outcome .    Patient has reviewed and agreed to the above plan.      Shirley Bartlett PsyD LP  January 29, 2025    Answers submitted by the patient for this visit:  Patient Health Questionnaire (Submitted on 12/3/2024)  If you checked off any problems, how difficult have these problems made it for you to do your work, take care of things at home, or get along with other people?: Very difficult  PHQ9 TOTAL SCORE: 10  Patient Health Questionnaire (G7) (Submitted on 12/3/2024)  YUDI 7 TOTAL SCORE: 9    Answers submitted by the patient for this visit:  Patient Health Questionnaire (Submitted on 12/17/2024)  If you checked off any problems, how difficult have these problems made it for you to do your work, take care of things at home, or get along with other people?: Very difficult  PHQ9 TOTAL SCORE: 15  Patient Health Questionnaire (G7) (Submitted on 12/17/2024)  YUDI 7 TOTAL SCORE: 12    Answers submitted by the patient for this visit:  Patient Health Questionnaire (Submitted on 1/14/2025)  If you checked off any problems, how difficult have these problems made it for you to do your work, take care of things at home, or get along with other people?: Somewhat difficult  PHQ9 TOTAL SCORE: 10    [facility] Safety Plan      Creation Date: 10/21/24       Step 1: Warning signs:    Warning Signs    Start thinking about death more      Step 2: Internal coping strategies - Things I can do to take my mind off my problems without contacting another person:     Strategies    Petting cats    Watching music videos    talk to family member    text friend Tono    hot bubble bath      Step 3: People and social settings that provide distraction:    Name Contact Information    Tono - friend [phone]    Mother [phone]         Step 4: People whom I can ask for help during a crisis:    Name Contact Information    Keyana - sister [phone]      Step 5: Professionals or agencies I can contact during a crisis:    Clinician/Agency Name Phone Emergency Contact    Shirley Bartlett [phone]    ANU CHENEY        [facility] Emergency Department Emergency Department Address Emergency Department Phone    [facility]       Suicide Prevention Lifeline Phone: Call or Text 601  Crisis Text Line: Text HOME to 430338     Step 6: Making the environment safer (plan for lethal means safety):   Previous medications - dispose of unused or older medications     Optional: What is most important to me and worth living for?:   Niece  Mother  Sister  Cats     [place] Safety Plan. Shanita Lazcano and Ariel Lorenzo. Used with permission of the authors.

## 2025-04-23 NOTE — TELEPHONE ENCOUNTER
Received request for a refill(s) of     oxyCODONE (ROXICODONE) 5 MG tablet        Last dispensed from pharmacy on 3/31/2025    Patient's last office/virtual visit by prescribing provider on 4/16/2025  Next office/virtual appointment scheduled for 5/22/2025    Last urine drug screen date 1/27/2025  Current opioid agreement on file (completed within the last year) YesDate of opioid agreement: 1/27/2025    E-prescribe to [id number] - [place] - [address].  pharmacy    Will route to nursing [place] for review and preparation of prescription(s).

## 2025-04-26 DIAGNOSIS — M45.8 ANKYLOSING SPONDYLITIS OF SACRAL REGION (H): ICD-10-CM

## 2025-04-26 DIAGNOSIS — M51.369 DDD (DEGENERATIVE DISC DISEASE), LUMBAR: ICD-10-CM

## 2025-04-26 DIAGNOSIS — E78.1 HYPERTRIGLYCERIDEMIA: ICD-10-CM

## 2025-04-28 ENCOUNTER — E-VISIT (OUTPATIENT)
Dept: FAMILY MEDICINE | Facility: CLINIC | Age: 50
End: 2025-04-28
Payer: MEDICARE

## 2025-04-28 DIAGNOSIS — J01.90 ACUTE NON-RECURRENT SINUSITIS, UNSPECIFIED LOCATION: Primary | ICD-10-CM

## 2025-04-28 RX ORDER — PREGABALIN 150 MG/1
150 CAPSULE ORAL 3 TIMES DAILY
Qty: 270 CAPSULE | Refills: 0 | Status: SHIPPED | OUTPATIENT
Start: 2025-04-28

## 2025-04-28 RX ORDER — ROSUVASTATIN CALCIUM 40 MG/1
40 TABLET, COATED ORAL DAILY
Qty: 90 TABLET | Refills: 3 | Status: SHIPPED | OUTPATIENT
Start: 2025-04-28

## 2025-04-28 RX ORDER — DOXYCYCLINE HYCLATE 100 MG
100 TABLET ORAL 2 TIMES DAILY
Qty: 14 TABLET | Refills: 0 | Status: SHIPPED | OUTPATIENT
Start: 2025-04-28 | End: 2025-05-05

## 2025-04-28 RX ORDER — FENOFIBRATE 48 MG/1
48 TABLET, FILM COATED ORAL DAILY
Qty: 90 TABLET | Refills: 3 | Status: SHIPPED | OUTPATIENT
Start: 2025-04-28

## 2025-04-28 RX ORDER — PREDNISONE 20 MG/1
20 TABLET ORAL 2 TIMES DAILY
Qty: 14 TABLET | Refills: 0 | Status: SHIPPED | OUTPATIENT
Start: 2025-04-28 | End: 2025-05-05

## 2025-04-29 ASSESSMENT — ANXIETY QUESTIONNAIRES
GAD7 TOTAL SCORE: 13
4. TROUBLE RELAXING: MORE THAN HALF THE DAYS
6. BECOMING EASILY ANNOYED OR IRRITABLE: MORE THAN HALF THE DAYS
7. FEELING AFRAID AS IF SOMETHING AWFUL MIGHT HAPPEN: NEARLY EVERY DAY
GAD7 TOTAL SCORE: 13
5. BEING SO RESTLESS THAT IT IS HARD TO SIT STILL: NOT AT ALL
7. FEELING AFRAID AS IF SOMETHING AWFUL MIGHT HAPPEN: NEARLY EVERY DAY
IF YOU CHECKED OFF ANY PROBLEMS ON THIS QUESTIONNAIRE, HOW DIFFICULT HAVE THESE PROBLEMS MADE IT FOR YOU TO DO YOUR WORK, TAKE CARE OF THINGS AT HOME, OR GET ALONG WITH OTHER PEOPLE: VERY DIFFICULT
GAD7 TOTAL SCORE: 13
1. FEELING NERVOUS, ANXIOUS, OR ON EDGE: MORE THAN HALF THE DAYS
8. IF YOU CHECKED OFF ANY PROBLEMS, HOW DIFFICULT HAVE THESE MADE IT FOR YOU TO DO YOUR WORK, TAKE CARE OF THINGS AT HOME, OR GET ALONG WITH OTHER PEOPLE?: VERY DIFFICULT
3. WORRYING TOO MUCH ABOUT DIFFERENT THINGS: MORE THAN HALF THE DAYS
2. NOT BEING ABLE TO STOP OR CONTROL WORRYING: MORE THAN HALF THE DAYS

## 2025-04-29 ASSESSMENT — PATIENT HEALTH QUESTIONNAIRE - PHQ9
10. IF YOU CHECKED OFF ANY PROBLEMS, HOW DIFFICULT HAVE THESE PROBLEMS MADE IT FOR YOU TO DO YOUR WORK, TAKE CARE OF THINGS AT HOME, OR GET ALONG WITH OTHER PEOPLE: SOMEWHAT DIFFICULT
SUM OF ALL RESPONSES TO PHQ QUESTIONS 1-9: 9
SUM OF ALL RESPONSES TO PHQ QUESTIONS 1-9: 9

## 2025-04-30 ENCOUNTER — VIRTUAL VISIT (OUTPATIENT)
Facility: CLINIC | Age: 50
End: 2025-04-30
Payer: MEDICARE

## 2025-04-30 DIAGNOSIS — F41.1 GENERALIZED ANXIETY DISORDER: ICD-10-CM

## 2025-04-30 DIAGNOSIS — F33.1 MAJOR DEPRESSIVE DISORDER, RECURRENT EPISODE, MODERATE (H): Primary | ICD-10-CM

## 2025-04-30 PROCEDURE — 90837 PSYTX W PT 60 MINUTES: CPT | Mod: 95 | Performed by: PSYCHOLOGIST

## 2025-04-30 NOTE — PROGRESS NOTES
[facility]                                     Progress Note    Patient Name: Joel Pineda  Date: April 30, 2025            Service Type: Individual      Session Start Time: 2:00 PM Session End Time: 2:54 PM     Session Length:  54 minutes    Session #: 22    Attendees: Client attended alone    Service Modality:  Video Visit:      Provider verified identity through the following two step process.  Patient provided:  Patient photo and Patient is known previously to provider    Telemedicine Visit: The patient's condition can be safely assessed and treated via synchronous audio and visual telemedicine encounter.      Reason for Telemedicine Visit: Patient convenience (e.g. access to timely appointments / distance to available provider)    Originating Site (Patient Location): Patient's home    Distant Site (Provider Location): Provider Remote Setting- Home Office    Consent:  The patient/guardian has verbally consented to: the potential risks and benefits of telemedicine (video visit) versus in person care; bill my insurance or make self-payment for services provided; and responsibility for payment of non-covered services.     Patient would like the video invitation sent by:  Text to cell phone: [phone]    Mode of Communication:  Video Conference via [facility]well    Distant Location (Provider):  Off-site    As the provider I attest to compliance with applicable laws and regulations related to telemedicine.    DATA  Interactive Complexity: Yes, visit entailed Interactive Complexity evidenced by: Patient's presenting concerns require more intensive intervention than could be completed within the usual service. Provider used clinical judgment in determining the necessary length for the session to benefit the patient's needs. Teaching/review of DBT skills.    Crisis: No        Progress Since Last Session (Related to Symptoms / Goals / Homework):   Symptoms: No change Tito continues to report low  mood, ongoing anxiety and panic.    Homework: Achieved / completed to satisfaction - using DBT skills for emotion regulation.      Episode of Care Goals: Satisfactory progress - CONTEMPLATION (Considering change and yet undecided); Intervened by assessing the negative and positive thinking (ambivalence) about behavior change     Current / Ongoing Stressors and Concerns:   Tito reports his neighbor offered to bring him some arts and crafts - he recognizes his perception they were actively avoiding him was mistaken. He admits his timeline can sometimes be 'fuzzy.' He recalls parting ways with a group of friends 10-15 years ago after he made a pass at another person in the group; he then saw them last summer while in the community and he perceived they made efforts to leave the store and avoid him; he does endorse barely recognizing them. He acknowledges his perception could be mistaken in this instance as well. He reports 'brief' instances of SVT. His cat woke him up 'per usual' in the morning, he held her mouth shut and his 'lizard brain told me to break her neck, I would never do that, I realized how preposterous and what a poor choice and everything - she was scared of me later.' He reports, 'I just wanted to stop her from meowing, I didn't want to hurt her.' He reports he has been sleeping better since Latuda dose was increased. Psychiatrist discontinued his Risperdol when starting Latuda. He reports plan to help his mother out twice a week. He reports using Chat GPT to summarize email he received from a , also used Chat GPT to curate a possible email response.      Treatment Objective(s) Addressed in This Session:   use cognitive strategies identified in therapy to challenge anxious thoughts  state understanding of stressors and relationship to physical symptoms  Decrease frequency and intensity of feeling down, depressed, hopeless  Identify negative self-talk and behaviors: challenge core beliefs,  myths, and actions  reduce their emotional vulnerability by 40% during the Emotion Regulation Module (6-8 weeks)      Intervention:      CBT: cognitive challenging, restructuring, reframing Behavioral activation techniques, grounding strategies  PCT: supportive autonomy, unconditional positive regard, empathic reflection, active listening  DBT: self-soothing with the 5 senses, TIP Skill, IMPROVE the moment, assertive communication/DEAR MAN    Assessments completed prior to visit:  The following assessments were completed by patient for this visit:  The following assessments were completed by patient for this visit:  PHQ9:       1/28/2025     8:32 AM 2/18/2025    10:29 AM 3/4/2025     2:20 PM 3/12/2025    11:35 AM 4/15/2025     2:32 PM 4/22/2025     2:28 PM 4/29/2025     3:35 PM   PHQ-9 SCORE   PHQ-9 Total Score MyChart 10 (Moderate depression) 10 (Moderate depression) 6 (Mild depression) 8 (Mild depression) 7 (Mild depression) 10 (Moderate depression) 9 (Mild depression)   PHQ-9 Total Score 10  10  6  8  7  10  9        Patient-reported     GAD7:       12/3/2024    12:14 PM 12/17/2024     2:51 PM 1/1/2025     3:50 PM 2/18/2025     2:18 PM 3/12/2025    11:39 AM 4/15/2025     2:35 PM 4/29/2025     3:35 PM   YUDI-7 SCORE   Total Score 9 (mild anxiety) 12 (moderate anxiety) 8 (mild anxiety) 10 (moderate anxiety) 15 (severe anxiety) 12 (moderate anxiety) 13 (moderate anxiety)   Total Score 9  12  8  10  15  12  13        Patient-reported         ASSESSMENT: Current Emotional / Mental Status (status of significant symptoms):   Risk status (Self / Other harm or suicidal ideation)   Patient reports the following current fears or concerns for personal safety: Ongoing passive thoughts of suicide with absence of plan or intent and agreed to follow his previously developed [place] Safety Plan.   Patient denies current or recent suicidal ideation or behaviors.   Patient denies current or recent homicidal ideation or  behaviors.   Patient denies current or recent self injurious behavior or ideation.   Patient denies other safety concerns.   Patient reports there has been no change in risk factors since their last session.     Patient reports there has been no change in protective factors since their last session.     A safety and risk management plan has been developed including: Patient consented to co-developed safety plan on 10/21/2024.  Safety and risk management plan was reviewed.   Patient agreed to use safety plan should any safety concerns arise.  A copy was made available to the patient.     Appearance:   Appropriate    Eye Contact:   Fair    Psychomotor Behavior: Normal    Attitude:   Cooperative    Orientation:   All   Speech    Rate / Production: Normal/ Responsive    Volume:  Normal    Mood:    Anxious  Depressed  Irritable  Sad    Affect:    Subdued  Worrisome    Thought Content:  Clear    Thought Form:  Coherent    Insight:    Fair      Medication Review:   Changes to psychiatric medications, see updated Medication List in EPIC.       Medication Compliance:   Yes - may not take some PRNs as often as he could     Changes in Health Issues:   None reported     Chemical Use Review:   Substance Use: Chemical use reviewed, no active concerns identified      Tobacco Use: No current tobacco use.      Diagnosis:  296.32 (F33.1) Major Depressive Disorder, Recurrent Episode, Moderate _.  300.02 (F41.1) Generalized Anxiety Disorder.    Collateral Reports Completed:   Not Applicable    PLAN: (Patient Tasks / Therapist Tasks / Other)    Patient to continue to use non-judgmental stance and cope ahead skills.     Patient and provider will continue practicing how to reframe automatic thinking from negative situations.     Patient will practice radical self-compassion techniques to decrease shame.     Patient to engage in basic self-cares and focus on present moment.     Shirley Bartlett PsyD LP                                                       ______________________________________________________________________    Individual Treatment Plan    Patient's Name: Joel Pineda  YOB: 1975    Date of Creation: October 30, 2024   Date Treatment Plan Last Reviewed/Revised: October 30, 2024; January 29, 2025; April 30, 2025     DSM5 Diagnoses: 296.32 (F33.1) Major Depressive Disorder, Recurrent Episode, Moderate _ or 300.02 (F41.1) Generalized Anxiety Disorder  Psychosocial / Contextual Factors: Medical complexities, Trauma history, Interpersonal concerns.    recent surgery and resulting acute pain, chronic pain, chronic mental health concerns, mobility limitations  PROMIS (reviewed every 90 days):   PROMIS-10 Scores        1/15/2025    12:04 PM 2/11/2025     2:54 PM 2/18/2025     2:19 PM   PROMIS-10 Total Score w/o Sub Scores   PROMIS TOTAL - SUBSCORES 21  21  18        Patient-reported        Referral / Collaboration:  Referral to another professional/service is not indicated at this time..    Anticipated number of session for this episode of care: 9-12 sessions  Anticipation frequency of session: Weekly  Anticipated Duration of each session: 53 or more minutes  Treatment plan will be reviewed in 90 days or when goals have been changed.     MeasurableTreatment Goal(s) related to diagnosis / functional impairment(s)  Goal 1: Patient will decrease anxiety by 75% as evidenced by YUDI-7    I will know I've met my goal when I am at peace with where I'm at.       Objective #A (Patient Action)                          Patient will identify the situations / thoughts that contribute to feeling anxious.  Status: Continued - Date(s):1/29/2025, 4/30/2025       Intervention(s)  Therapist will process anxiety-proving emotions.     Objective #B  Patient will use at least 3 coping skills for anxiety management in the next 4 weeks.  Status: Continued - Date(s):1/29/2025, 4/30/2025        Intervention(s)  Therapist will assign homework : coping skills  practice to decrease anxiety .     Objective #C  Patient will use cognitive strategies identified in therapy to challenge anxious thoughts.  Status: Continued - Date(s):1/29/2025, 4/30/2025        Intervention(s)  Therapist will  teach the patient ways to reframe negative core beliefs that contribute to anxiety.     Goal 2 Patient will report pain levels are consistently rated at 2/10 per patient report.   I will know I've met my goal when my pain is less disruptive.      Objective #A (Patient Action)    Patient will identify 5 strategies for managing pain.  Status: Continued - Date(s):1/29/2025, 4/30/2025     Intervention(s)  Therapist will teach  strategies to manage pain and assign homework to use 3 strategies daily .    Objective #B  Patient will  engage in physical activity and PT exercises 2-3 times daily .  Status: Continued - Date(s):1/29/2025, 4/30/2025      Intervention(s)  Therapist will assign homework to engage in at minimum one PT or physical activity daily .    Objective #C  Patient will Identify negative self-talk and behaviors: challenge core beliefs, myths, and actions.  Status: Continued - Date(s):1/29/2025, 4/30/2025      Intervention(s)  Therapist will teach non-judgmental stance and help patient reframe negative self-talk  .      Goal 3: Patient will report reduction in depressive symptoms as evidenced by PHQ-9 score reduction of 75% or greater.     I will know I've met my goal when I feel like I have purpose in life.      Objective #A (Patient Action)    Status: Continued - Date(s):1/29/2025, 4/30/2025      Patient will Decrease frequency and intensity of feeling down, depressed, hopeless.    Intervention(s)  Therapist will teach emotional recognition/identification. Therapist will reinforce use of emotion regulation skills .    Objective #B  Patient will use healthy communication when describing his emotions or when setting boundaries with others.     Status: Continued - Date(s):1/29/2025,  4/30/2025      Intervention(s)  Therapist will teach emotional regulation skills and interpersonal effectiveness skills to improve quality of communication. Therapist will teach assertive communication skills.    Objective #C  Patient will track and record at least 3 pleasant exchanges with family members such as mother, father, sister, brother .  Status: Continued - Date(s):1/29/2025, 4/30/2025     Intervention(s)  Therapist will teach about healthy boundaries. Therapist will encourage patient to focus on positive interactions with family and use cope ahead to increase likelihood of pleasant experience as an outcome .    Patient has reviewed and agreed to the above plan.      Shirley Bartlett PsyD LP  April 30, 2025   Answers submitted by the patient for this visit:  Patient Health Questionnaire (Submitted on 12/3/2024)  If you checked off any problems, how difficult have these problems made it for you to do your work, take care of things at home, or get along with other people?: Very difficult  PHQ9 TOTAL SCORE: 10  Patient Health Questionnaire (G7) (Submitted on 12/3/2024)  YUDI 7 TOTAL SCORE: 9    Answers submitted by the patient for this visit:  Patient Health Questionnaire (Submitted on 12/17/2024)  If you checked off any problems, how difficult have these problems made it for you to do your work, take care of things at home, or get along with other people?: Very difficult  PHQ9 TOTAL SCORE: 15  Patient Health Questionnaire (G7) (Submitted on 12/17/2024)  YUDI 7 TOTAL SCORE: 12    Answers submitted by the patient for this visit:  Patient Health Questionnaire (Submitted on 1/14/2025)  If you checked off any problems, how difficult have these problems made it for you to do your work, take care of things at home, or get along with other people?: Somewhat difficult  PHQ9 TOTAL SCORE: 10    [place] Safety Plan      Creation Date: 10/21/24       Step 1: Warning signs:    Warning Signs    Start thinking about  death more      Step 2: Internal coping strategies - Things I can do to take my mind off my problems without contacting another person:    Strategies    Petting cats    Watching music videos    talk to family member    text friend Tono    hot bubble bath      Step 3: People and social settings that provide distraction:    Name Contact Information    Tono - friend [phone]    Mother [phone]         Step 4: People whom I can ask for help during a crisis:    Name Contact Information    Keyana - sister [phone]      Step 5: Professionals or agencies I can contact during a crisis:    Clinician/Agency Name Phone Emergency Contact    Shirley Bartlett [phone]    ANU CHENEY        [facility] Emergency Department Emergency Department Address Emergency Department Phone    [facility]       Suicide Prevention Lifeline Phone: Call or Text 499  Crisis Text Line: Text HOME to 400262     Step 6: Making the environment safer (plan for lethal means safety):   Previous medications - dispose of unused or older medications     Optional: What is most important to me and worth living for?:   Niece  Mother  Sister  Cats     [place] Safety Plan. Shanita Lazcano and Ariel Lorenzo. Used with permission of the authors.          Answers submitted by the patient for this visit:  Patient Health Questionnaire (Submitted on 4/29/2025)  If you checked off any problems, how difficult have these problems made it for you to do your work, take care of things at home, or get along with other people?: Somewhat difficult  PHQ9 TOTAL SCORE: 9  Patient Health Questionnaire (G7) (Submitted on 4/29/2025)  YUDI 7 TOTAL SCORE: 13

## 2025-05-05 NOTE — PROGRESS NOTES
Medication Therapy Management (MTM) Encounter    ASSESSMENT:                            Medication Adherence/Access: No issues reported    Sinus Infection  Improving    Diabetes  Patient is meeting A1c goal of < 7%. Titrating Mounjaro dose slowly to aid in tolerability. Due for updated microalbumin.     Mood/Insomnia: Stable. Follows closely with psychiatry.    Asthma  Worsened with sinus infection. Is managing with more frequent use of albuterol    Supplements  Would benefit from updated Vitamin D and Vitamin B12 labs.    Hypothyroidism   Stable.    Constipation  Stable    Allergic rhinitis   Stable.    Hypertension   Stable.    Recurrent Shingles  Stable    PLAN:                            Recommend updated Vitamin D, B12 and microalbumin labs    Follow-up:  endocrinology MTM in June and 4 months with primary care MTM    SUBJECTIVE/OBJECTIVE:                          Joel Pineda is a 49 year old male seen for a follow up visit. He was referred to me from Ksenia Lyles. Follow up from 2/4/2025.    Reason for visit: Medication Review    Patient follows with endocrinology MTM    Allergies/ADRs: Reviewed in chart  Past Medical History: Reviewed in chart  Tobacco: He reports that he has never smoked. He has never been exposed to tobacco smoke. He has never used smokeless tobacco.  Alcohol: none  Caffeine: 24 ounces of coffee/day  Activity: None    Medication Adherence/Access: no issues reported  Patient uses pill box(es).  Patient works with [employer] (delivered to his house)   Asked pharmacy to switch to non child proof caps.    Sinus Infection  Doxycycline 100mg twice daily     Was on a course of antibiotics and prednisone. Finished dose yesterday. Still having symptoms so Dr. Lyles extended his prescription for doxycycline.      Diabetes   Patient diagnosed with type 2 diabetes   Current Medications:  Mounjaro 2.5mg weekly on Fridays- has have been able to tolerate better compared to Ozempic therapy.  Of note, patient does have a history of gastroparesis. Nausea seems to be getting better with time on the 2.5mg of Mounjaro.Has been taking metoclopramide every other day. Plan is to be on 2.5mg dose of Mounjaro for 3 months due to sensitivity. Has another month on the 2.5mg and then will be following up with jamar HARRIS in June.  Medication History:  - Ozempic: rash occurring on face with higher doses, replaced by Mounjaro       Eye exam is up to date  Foot exam: due     Mood/Insomnia:  Duloxetine 120mg daily  Latuda 40mg daily  Bupropion XL 300mg daily  Clonazepam 1mg at bedtime for RBD-has been using 0.5mg during the day about every other day. Risperdone alone has not been effective.  Lunesta 3mg at bedtime  Melatonin 10mg nightly     When bupropion was increased to 450mg and was having extreme forgetfulness (I.e. losing car n parking lot) so dose was decreased back to 300mg.   Latuda was started a couple of months ago. They tried decreasing dose from 40 to 20mg but then wasn't able to sleep.  Latuda was started to help with agitation.He is taking less as needed clonazepam. Risperidone was discontinued as well.   Sleep has been good. CPAP is a little bulky.  Mood is so-so. Meeting with therapist weekly.  Follows  with Dr. CHENEY at [facility].     Asthma   Breo 100-25mcg 1 puff daily   Albuterol 2 puffs every 6 hours as needed-using a couple of times a day with sinus infection and cough  Montelukast 10mg daily    Patient rinses their mouth after using steroid inhaler.   Patient reports no current medication side effects.      Triggers include: smoke, upper respiratory infections, and strong odors and fumes.  Patient reports the following symptoms: increased need of albuterol.    [facility]. 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             Supplements     Vitamin D 50,000 units every 2 weeks  Vitamin B12 injection monthly  Multivitamin daily  Vitamin B complex  daily  Zinc Vitamin C daily   No reported issues at this time.        Hypothyroidism   Levothyroxine 75 mcg daily.   Patient is having the following symptoms: none.           Constipation  Ducolax every other week  Hasty some non medication     Allergy     fexofenadine 180 mg once daily  Flonase (fluticasone) nasal spray - 2 spray(s) each nostril once daily  Ketotiofen 1 drop daily as needed  Systane gel twice daily   Patient reports no current medication side effects.      Patient feels that current therapy is effective.         Hypertension   Ramipril 10mg daily  Metoprolol XL 200mg in the morning and 100mg in the evening  Patient reports no current medication side effects  Patient does not self-monitor blood pressure.           Recurrent Shingles    Valcyclovir 500mg daily    Today's Vitals: There were no vitals taken for this visit.  ----------------    I spent 24 minutes with this patient today. All changes were made via collaborative practice agreement with Ksenia Lyles. A copy of the visit note was provided to the patient's provider(s).    A summary of these recommendations was sent via [employer].    Radha Mcdonald, Pharm.D, BCACP  Medication Therapy Management Pharmacist      Telemedicine Visit Details  The patient's medications can be safely assessed via a telemedicine encounter.  Type of service:  Telephone visit  Originating Location (pt. Location): Home    Distant Location (provider location):  On-site  Start Time: 2:01PM  End Time: 2:25PM     Medication Therapy Recommendations  Takes dietary supplements   1 Current Medication: cyanocobalamin (CYANOCOBALAMIN) 1000 mcg/mL injection   Current Medication Sig: INJECT 1ML INTO THE MUSCLE EVERY 30 DAYS   Rationale: Medication requires monitoring - Needs additional monitoring   Recommendation: Order Lab   Status: Accepted per CPA   Identified Date: 5/6/2025 Completed Date: 5/6/2025

## 2025-05-06 ENCOUNTER — VIRTUAL VISIT (OUTPATIENT)
Dept: PHARMACY | Facility: CLINIC | Age: 50
End: 2025-05-06
Payer: COMMERCIAL

## 2025-05-06 DIAGNOSIS — J30.2 SEASONAL ALLERGIC RHINITIS, UNSPECIFIED TRIGGER: ICD-10-CM

## 2025-05-06 DIAGNOSIS — I10 HYPERTENSION GOAL BP (BLOOD PRESSURE) < 140/90: ICD-10-CM

## 2025-05-06 DIAGNOSIS — Z86.19 H/O HERPES ZOSTER: ICD-10-CM

## 2025-05-06 DIAGNOSIS — E53.8 B12 DEFICIENCY: Primary | ICD-10-CM

## 2025-05-06 DIAGNOSIS — E11.9 TYPE 2 DIABETES MELLITUS WITHOUT COMPLICATION, WITHOUT LONG-TERM CURRENT USE OF INSULIN (H): ICD-10-CM

## 2025-05-06 DIAGNOSIS — K59.00 CONSTIPATION, UNSPECIFIED CONSTIPATION TYPE: ICD-10-CM

## 2025-05-06 DIAGNOSIS — J32.9 SINUS INFECTION: ICD-10-CM

## 2025-05-06 DIAGNOSIS — Z78.9 TAKES DIETARY SUPPLEMENTS: ICD-10-CM

## 2025-05-06 DIAGNOSIS — B02.9 HERPES ZOSTER WITHOUT COMPLICATION: ICD-10-CM

## 2025-05-06 DIAGNOSIS — J45.30 MILD PERSISTENT ASTHMA WITHOUT COMPLICATION: ICD-10-CM

## 2025-05-06 DIAGNOSIS — J01.81 OTHER ACUTE RECURRENT SINUSITIS: ICD-10-CM

## 2025-05-06 DIAGNOSIS — G47.00 INSOMNIA, UNSPECIFIED TYPE: ICD-10-CM

## 2025-05-06 RX ORDER — LURASIDONE HYDROCHLORIDE 40 MG/1
40 TABLET, FILM COATED ORAL
COMMUNITY

## 2025-05-06 NOTE — PATIENT INSTRUCTIONS
"Recommendations from today's MTM visit:                                                         Recommend updated Vitamin D, B12 and microalbumin labs    Follow-up: 3 months    It was great speaking with you today.  I value your experience and would be very thankful for your time in providing feedback in our clinic survey. In the next few days, you may receive an email or text message from [facility] [employer] with a link to a survey related to your  clinical pharmacist.\"     To schedule another MTM appointment, please call the clinic directly or you may call the MTM scheduling line at [phone] or toll-free at [phone].     My Clinical Pharmacist's contact information:                                                      Please feel free to contact me with any questions or concerns you have.      Radha Mcdonald, Pharm.D, BCACP  Medication Therapy Management Pharmacist      "

## 2025-05-07 ENCOUNTER — VIRTUAL VISIT (OUTPATIENT)
Facility: CLINIC | Age: 50
End: 2025-05-07
Payer: MEDICARE

## 2025-05-07 DIAGNOSIS — F41.1 GENERALIZED ANXIETY DISORDER: ICD-10-CM

## 2025-05-07 DIAGNOSIS — F33.1 MAJOR DEPRESSIVE DISORDER, RECURRENT EPISODE, MODERATE (H): Primary | ICD-10-CM

## 2025-05-07 PROCEDURE — 90837 PSYTX W PT 60 MINUTES: CPT | Mod: 95 | Performed by: PSYCHOLOGIST

## 2025-05-07 NOTE — PROGRESS NOTES
[facility]                                     Progress Note    Patient Name: Joel Pineda  Date: May 7, 2025             Service Type: Individual      Session Start Time: 2:00 PM Session End Time: 2:58 PM     Session Length:  58 minutes    Session #: 23    Attendees: Client attended alone    Service Modality:  Video Visit:      Provider verified identity through the following two step process.  Patient provided:  Patient photo and Patient is known previously to provider    Telemedicine Visit: The patient's condition can be safely assessed and treated via synchronous audio and visual telemedicine encounter.      Reason for Telemedicine Visit: Patient convenience (e.g. access to timely appointments / distance to available provider)    Originating Site (Patient Location): Patient's home    Distant Site (Provider Location): Provider Remote Setting- Home Office    Consent:  The patient/guardian has verbally consented to: the potential risks and benefits of telemedicine (video visit) versus in person care; bill my insurance or make self-payment for services provided; and responsibility for payment of non-covered services.     Patient would like the video invitation sent by:  Text to cell phone: [phone]    Mode of Communication:  Video Conference via Amwell    Distant Location (Provider):  Off-site    As the provider I attest to compliance with applicable laws and regulations related to telemedicine.    DATA  Interactive Complexity: Yes, visit entailed Interactive Complexity evidenced by: Patient's presenting concerns require more intensive intervention than could be completed within the usual service. Provider used clinical judgment in determining the necessary length for the session to benefit the patient's needs. Teaching/review of DBT skills.    Crisis: No        Progress Since Last Session (Related to Symptoms / Goals / Homework):   Symptoms: No change Tito continues to report low mood,  ongoing anxiety and panic.    Homework: Achieved / completed to satisfaction - using DBT skills for emotion regulation.      Episode of Care Goals: Satisfactory progress - CONTEMPLATION (Considering change and yet undecided); Intervened by assessing the negative and positive thinking (ambivalence) about behavior change     Current / Ongoing Stressors and Concerns:   Tito reports feeling pleased brother has noticed benefit with his speech therapy. Patient and provider processed ongoing communication issues with colleague on [employer] and discussed assertive communication skills. Patient and provider explored strategies to manage concerns about his mother; he feels his current plan to see her twice a week is helpful right now. He also reports it is a nice break from his house as well - he feels his house is a mess, and his motivation level is quite low. Patient and provider explored challenging automatic negative self-talk that perpetuates low mood and low motivation. Discussed strategies to increase motivation: spend just 5 minutes engaging in an activity, attaching meaning to the task, SMART goals. Discussed we may need to reduce frequency as provider's schedule is getting more full, however will assess in next couple weeks.      Treatment Objective(s) Addressed in This Session:   identify three distraction and diversion activities and use those activities to decrease level of anxiety   and use cognitive strategies identified in therapy to challenge anxious thoughts, Increase interest, engagement, and pleasure in doing things  Decrease frequency and intensity of feeling down, depressed, hopeless  Identify negative self-talk and behaviors: challenge core beliefs, myths, and actions, identify 5 traits or charcteristics you like about yourself, practice one mindfulness skill each day for 5 minutes and reduce their emotional vulnerability by 40% during the Emotion Regulation Module (6-8 weeks)       Intervention:      CBT: cognitive challenging, restructuring, reframing Behavioral activation techniques, grounding strategies  PCT: supportive autonomy, unconditional positive regard, empathic reflection, active listening  DBT: self-soothing with the 5 senses, TIP Skill, IMPROVE the moment, assertive communication/DEAR MAN    Assessments completed prior to visit:  The following assessments were completed by patient for this visit:  The following assessments were completed by patient for this visit:  PHQ9:       1/28/2025     8:32 AM 2/18/2025    10:29 AM 3/4/2025     2:20 PM 3/12/2025    11:35 AM 4/15/2025     2:32 PM 4/22/2025     2:28 PM 4/29/2025     3:35 PM   PHQ-9 SCORE   PHQ-9 Total Score MyChart 10 (Moderate depression) 10 (Moderate depression) 6 (Mild depression) 8 (Mild depression) 7 (Mild depression) 10 (Moderate depression) 9 (Mild depression)   PHQ-9 Total Score 10  10  6  8  7  10  9        Patient-reported     GAD7:       12/3/2024    12:14 PM 12/17/2024     2:51 PM 1/1/2025     3:50 PM 2/18/2025     2:18 PM 3/12/2025    11:39 AM 4/15/2025     2:35 PM 4/29/2025     3:35 PM   YUDI-7 SCORE   Total Score 9 (mild anxiety) 12 (moderate anxiety) 8 (mild anxiety) 10 (moderate anxiety) 15 (severe anxiety) 12 (moderate anxiety) 13 (moderate anxiety)   Total Score 9  12  8  10  15  12  13        Patient-reported         ASSESSMENT: Current Emotional / Mental Status (status of significant symptoms):   Risk status (Self / Other harm or suicidal ideation)   Patient reports the following current fears or concerns for personal safety: Ongoing passive thoughts of suicide with absence of plan or intent and agreed to follow his previously developed [employer] Safety Plan.   Patient denies current or recent suicidal ideation or behaviors.   Patient denies current or recent homicidal ideation or behaviors.   Patient denies current or recent self injurious behavior or ideation.   Patient denies other safety  concerns.   Patient reports there has been no change in risk factors since their last session.     Patient reports there has been no change in protective factors since their last session.     A safety and risk management plan has been developed including: Patient consented to co-developed safety plan on 10/21/2024.  Safety and risk management plan was reviewed.   Patient agreed to use safety plan should any safety concerns arise.  A copy was made available to the patient.     Appearance:   Appropriate    Eye Contact:   Fair    Psychomotor Behavior: Normal    Attitude:   Cooperative    Orientation:   All   Speech    Rate / Production: Normal/ Responsive    Volume:  Normal    Mood:    Anxious  Depressed  Irritable  Sad    Affect:    Subdued  Worrisome    Thought Content:  Clear    Thought Form:  Coherent    Insight:    Fair      Medication Review:   Changes to psychiatric medications, see updated Medication List in EPIC.       Medication Compliance:   Yes - may not take some PRNs as often as he could     Changes in Health Issues:   None reported     Chemical Use Review:   Substance Use: Chemical use reviewed, no active concerns identified      Tobacco Use: No current tobacco use.      Diagnosis:  296.32 (F33.1) Major Depressive Disorder, Recurrent Episode, Moderate _.  300.02 (F41.1) Generalized Anxiety Disorder.    Collateral Reports Completed:   Not Applicable    PLAN: (Patient Tasks / Therapist Tasks / Other)    Patient to continue to use non-judgmental stance and cope ahead skills.     Patient and provider will continue practicing how to reframe automatic thinking from negative situations.     Patient will practice radical self-compassion techniques to decrease shame.     Patient to engage in basic self-cares and focus on present moment.     Patient to explore strategies to increase motivation.     Shirley Bartlett PsyD LP                                                       ______________________________________________________________________    Individual Treatment Plan    Patient's Name: Joel Pineda  YOB: 1975    Date of Creation: October 30, 2024   Date Treatment Plan Last Reviewed/Revised: October 30, 2024; January 29, 2025; April 30, 2025     DSM5 Diagnoses: 296.32 (F33.1) Major Depressive Disorder, Recurrent Episode, Moderate _ or 300.02 (F41.1) Generalized Anxiety Disorder  Psychosocial / Contextual Factors: Medical complexities, Trauma history, Interpersonal concerns.    recent surgery and resulting acute pain, chronic pain, chronic mental health concerns, mobility limitations  PROMIS (reviewed every 90 days):   PROMIS-10 Scores        1/15/2025    12:04 PM 2/11/2025     2:54 PM 2/18/2025     2:19 PM   PROMIS-10 Total Score w/o Sub Scores   PROMIS TOTAL - SUBSCORES 21  21  18        Patient-reported        Referral / Collaboration:  Referral to another professional/service is not indicated at this time..    Anticipated number of session for this episode of care: 9-12 sessions  Anticipation frequency of session: Weekly  Anticipated Duration of each session: 53 or more minutes  Treatment plan will be reviewed in 90 days or when goals have been changed.     MeasurableTreatment Goal(s) related to diagnosis / functional impairment(s)  Goal 1: Patient will decrease anxiety by 75% as evidenced by YUDI-7    I will know I've met my goal when I am at peace with where I'm at.       Objective #A (Patient Action)                          Patient will identify the situations / thoughts that contribute to feeling anxious.  Status: Continued - Date(s):1/29/2025, 4/30/2025       Intervention(s)  Therapist will process anxiety-proving emotions.     Objective #B  Patient will use at least 3 coping skills for anxiety management in the next 4 weeks.  Status: Continued - Date(s):1/29/2025, 4/30/2025        Intervention(s)  Therapist will assign homework : coping skills practice to  decrease anxiety .     Objective #C  Patient will use cognitive strategies identified in therapy to challenge anxious thoughts.  Status: Continued - Date(s):1/29/2025, 4/30/2025        Intervention(s)  Therapist will  teach the patient ways to reframe negative core beliefs that contribute to anxiety.     Goal 2 Patient will report pain levels are consistently rated at 2/10 per patient report.   I will know I've met my goal when my pain is less disruptive.      Objective #A (Patient Action)    Patient will identify 5 strategies for managing pain.  Status: Continued - Date(s):1/29/2025, 4/30/2025     Intervention(s)  Therapist will teach strategies to manage pain and assign homework to use 3 strategies daily.    Objective #B  Patient will engage in physical activity and PT exercises 2-3 times daily.  Status: Continued - Date(s):1/29/2025, 4/30/2025      Intervention(s)  Therapist will assign homework to engage in at minimum one PT or physical activity daily.    Objective #C  Patient will Identify negative self-talk and behaviors: challenge core beliefs, myths, and actions.  Status: Continued - Date(s):1/29/2025, 4/30/2025      Intervention(s)  Therapist will teach non-judgmental stance and help patient reframe negative self-talk .      Goal 3: Patient will report reduction in depressive symptoms as evidenced by PHQ-9 score reduction of 75% or greater.     I will know I've met my goal when I feel like I have purpose in life.      Objective #A (Patient Action)    Status: Continued - Date(s):1/29/2025, 4/30/2025      Patient will Decrease frequency and intensity of feeling down, depressed, hopeless.    Intervention(s)  Therapist will teach emotional recognition/identification. Therapist will reinforce use of emotion regulation skills.    Objective #B  Patient will use healthy communication when describing his emotions or when setting boundaries with others.     Status: Continued - Date(s):1/29/2025, 4/30/2025       Intervention(s)  Therapist will teach emotional regulation skills and interpersonal effectiveness skills to improve quality of communication. Therapist will teach assertive communication skills.    Objective #C  Patient will track and record at least 3 pleasant exchanges with family members such as mother, father, sister, brother.  Status: Continued - Date(s):1/29/2025, 4/30/2025     Intervention(s)  Therapist will teach about healthy boundaries. Therapist will encourage patient to focus on positive interactions with family and use cope ahead to increase likelihood of pleasant experience as an outcome.    Patient has reviewed and agreed to the above plan.      Shirley Bartlett PsyD LP  April 30, 2025   Answers submitted by the patient for this visit:  Patient Health Questionnaire (Submitted on 12/3/2024)  If you checked off any problems, how difficult have these problems made it for you to do your work, take care of things at home, or get along with other people?: Very difficult  PHQ9 TOTAL SCORE: 10  Patient Health Questionnaire (G7) (Submitted on 12/3/2024)  YUDI 7 TOTAL SCORE: 9    Answers submitted by the patient for this visit:  Patient Health Questionnaire (Submitted on 12/17/2024)  If you checked off any problems, how difficult have these problems made it for you to do your work, take care of things at home, or get along with other people?: Very difficult  PHQ9 TOTAL SCORE: 15  Patient Health Questionnaire (G7) (Submitted on 12/17/2024)  YUDI 7 TOTAL SCORE: 12    Answers submitted by the patient for this visit:  Patient Health Questionnaire (Submitted on 1/14/2025)  If you checked off any problems, how difficult have these problems made it for you to do your work, take care of things at home, or get along with other people?: Somewhat difficult  PHQ9 TOTAL SCORE: 10    [facility] Safety [facility]      Creation Date: 10/21/24       Step 1: Warning signs:    Warning Signs    Start thinking about death more       Step 2: Internal coping strategies - Things I can do to take my mind off my problems without contacting another person:    Strategies    Petting cats    Watching music videos    talk to family member    text friend Tono    hot bubble bath      Step 3: People and social settings that provide distraction:    Name Contact Information    Tono - dejan [phone]    Mother [phone]         Step 4: People whom I can ask for help during a crisis:    Name Contact Information    Keyana - sister [phone]      Step 5: Professionals or agencies I can contact during a crisis:    Clinician/Agency Name Phone Emergency Contact    Shirley Bartlett [phone]    ANU CHENEY        Local Emergency Department Emergency Department Address Emergency Department Phone    [facility]       Suicide Prevention Lifeline Phone: Call or Text 423  Crisis Text Line: Text HOME to 939003     Step 6: Making the environment safer (plan for lethal means safety):   Previous medications - dispose of unused or older medications     Optional: What is most important to me and worth living for?:   Niece  Mother  Sister  Cats     [facility] Safety Plan. Shanita Lazcano and Ariel Lorenzo. Used with permission of the authors.          Answers submitted by the patient for this visit:  Patient Health Questionnaire (Submitted on 4/29/2025)  If you checked off any problems, how difficult have these problems made it for you to do your work, take care of things at home, or get along with other people?: Somewhat difficult  PHQ9 TOTAL SCORE: 9  Patient Health Questionnaire (G7) (Submitted on 4/29/2025)  YUDI 7 TOTAL SCORE: 13

## 2025-05-10 ENCOUNTER — NURSE TRIAGE (OUTPATIENT)
Dept: NURSING | Facility: CLINIC | Age: 50
End: 2025-05-10
Payer: MEDICARE

## 2025-05-10 NOTE — TELEPHONE ENCOUNTER
Nurse Triage SBAR    Is this a 2nd Level Triage? NO    Situation: Dizziness.     Background: Patient's is calling regarding dizziness and feeling parched or thirsty for the last week with an overall worsening of symptoms. He has a history of Type 2 Diabetes.    Assessment: The caller is reporting his vital signs one hour ago include blood pressure of 94/65 and heart rate of 89. He also reports an oxygen saturation of 99% on room air. He reports no new medications. He also notes that he has adequate PO intake with no concerns of dehydration.     Protocol Recommended Disposition:   See PCP Within 24 Hours    Recommendation: Patient was advised of disposition recommendation and discussed with writer options for seeking care including virtual urgent care, e-visit, and in person urgent care. Patient inquired about holding his metoprolol and writer advised that he should not change his medication administration until he is able to consult with a provider. The caller was also provided care advice and call back information.        Does the patient meet one of the following criteria for ADS visit consideration? 16+ years old, with an [facility] PCP     TIP  Providers, please consider if this condition is appropriate for management at one of our Acute and Diagnostic Services sites.     If patient is a good candidate, please use dotphrase <dot>triageresponse and select Refer to ADS to document.      Reason for Disposition   [1] MODERATE dizziness (e.g., interferes with normal activities) AND [2] has NOT been evaluated by doctor (or NP/PA) for this  (Exception: Dizziness caused by heat exposure, sudden standing, or poor fluid intake.)    Additional Information   Negative: SEVERE difficulty breathing (e.g., struggling for each breath, speaks in single words)   Negative: [1] Difficulty breathing or swallowing AND [2] started suddenly after medicine, an allergic food or bee sting   Negative: Shock suspected (e.g., cold/pale/clammy  "skin, too weak to stand, low BP, rapid pulse)   Negative: Difficult to awaken or acting confused (e.g., disoriented, slurred speech)   Negative: [1] Weakness (i.e., paralysis, loss of muscle strength) of the face, arm or leg on one side of the body AND [2] sudden onset AND [3] present now   Negative: [1] Numbness (i.e., loss of sensation) of the face, arm or leg on one side of the body AND [2] sudden onset AND [3] present now   Negative: [1] Loss of speech or garbled speech AND [2] sudden onset AND [3] present now   Negative: Overdose (accidental or intentional) of medications   Negative: [1] Fainted > 15 minutes ago AND [2] still feels too weak or dizzy to stand   Negative: Heart beating < 50 beats per minute OR > 140 beats per minute   Negative: Sounds like a life-threatening emergency to the triager   Negative: Chest pain   Negative: Difficulty breathing   Negative: SEVERE dizziness (e.g., unable to stand, requires support to walk, feels like passing out now)   Negative: Extra heartbeats, irregular heart beating, or heart is beating very fast  (i.e., \"palpitations\")   Negative: [1] Drinking very little AND [2] dehydration suspected (e.g., no urine > 12 hours, very dry mouth, very lightheaded)   Negative: [1] Weakness (i.e., paralysis, loss of muscle strength) of the face, arm / hand, or leg / foot on one side of the body AND [2] sudden onset AND [3] brief (now gone)   Negative: [1] Numbness (i.e., loss of sensation) of the face, arm / hand, or leg / foot on one side of the body AND [2] sudden onset AND [3] brief (now gone)   Negative: [1] Loss of speech or garbled speech AND [2] sudden onset AND [3] brief (now gone)   Negative: Loss of vision or double vision  (Exception: Similar to previous migraines.)   Negative: Patient sounds very sick or weak to the triager   Negative: [1] Dizziness caused by heat exposure, sudden standing, or poor fluid intake AND [2] no improvement after 2 hours of rest and fluids   " Negative: [1] Fever > 103 F (39.4 C) AND [2] not able to get the fever down using Fever Care Advice   Negative: [1] Fever > 101 F (38.3 C) AND [2] age > 60 years   Negative: [1] Fever > 100.0 F (37.8 C) AND [2] bedridden (e.g., CVA, chronic illness, recovering from surgery)   Negative: [1] Fever > 100.0 F (37.8 C) AND [2] diabetes mellitus or weak immune system (e.g., HIV positive, cancer chemo, splenectomy, organ transplant, chronic steroids)    Protocols used: Dizziness - [id number]

## 2025-05-13 ENCOUNTER — DOCUMENTATION ONLY (OUTPATIENT)
Dept: OTOLARYNGOLOGY | Facility: CLINIC | Age: 50
End: 2025-05-13
Payer: MEDICARE

## 2025-05-13 NOTE — PROGRESS NOTES
Plan of care certification for 5/10/25-6/7/25 faxed to edvin gamboa. Fax number [phone]. 6 [facility] faxed.

## 2025-05-14 ENCOUNTER — VIRTUAL VISIT (OUTPATIENT)
Facility: CLINIC | Age: 50
End: 2025-05-14
Payer: MEDICARE

## 2025-05-14 DIAGNOSIS — F33.1 MAJOR DEPRESSIVE DISORDER, RECURRENT EPISODE, MODERATE (H): Primary | ICD-10-CM

## 2025-05-14 DIAGNOSIS — F41.1 GENERALIZED ANXIETY DISORDER: ICD-10-CM

## 2025-05-14 PROCEDURE — 90837 PSYTX W PT 60 MINUTES: CPT | Mod: 95 | Performed by: PSYCHOLOGIST

## 2025-05-14 NOTE — PROGRESS NOTES
[facility]                                     Progress Note    Patient Name: Joel Pineda  Date: May 14, 2025             Service Type: Individual      Session Start Time: 2:01 PM Session End Time: 2:54 PM     Session Length:  53 minutes    Session #: 24    Attendees: Client attended alone    Service Modality:  Video Visit:      Provider verified identity through the following two step process.  Patient provided:  Patient photo and Patient is known previously to provider    Telemedicine Visit: The patient's condition can be safely assessed and treated via synchronous audio and visual telemedicine encounter.      Reason for Telemedicine Visit: Patient convenience (e.g. access to timely appointments / distance to available provider)    Originating Site (Patient Location): Patient's home    Distant Site (Provider Location): Provider Remote Setting- Home Office    Consent:  The patient/guardian has verbally consented to: the potential risks and benefits of telemedicine (video visit) versus in person care; bill my insurance or make self-payment for services provided; and responsibility for payment of non-covered services.     Patient would like the video invitation sent by:  Text to cell phone: [phone]    Mode of Communication:  Video Conference via Amwell    Distant Location (Provider):  Off-site    As the provider I attest to compliance with applicable laws and regulations related to telemedicine.    DATA  Interactive Complexity: Yes, visit entailed Interactive Complexity evidenced by: Patient's presenting concerns require more intensive intervention than could be completed within the usual service. Provider used clinical judgment in determining the necessary length for the session to benefit the patient's needs. Teaching/review of DBT skills.    Crisis: No        Progress Since Last Session (Related to Symptoms / Goals / Homework):   Symptoms: No change Tito continues to report low  mood, ongoing anxiety and panic.    Homework: Achieved / completed to satisfaction - using DBT skills for emotion regulation.      Episode of Care Goals: Satisfactory progress - CONTEMPLATION (Considering change and yet undecided); Intervened by assessing the negative and positive thinking (ambivalence) about behavior change     Current / Ongoing Stressors and Concerns:   Tito reports ongoing success with [facility], which PT also observes. Sister continues to stay in [place] for now, unsure about what her long-term plans are. He reports noting return of 'bee sting' sensations on his legs, worries the his back surgery benefits have worn off. Received a text out of the blue from sister in law about his brother's drinking. Patient and provider discussed ways to assertive communication to avoid triangulation. 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 Patient and provider explored ways to continue to use reframing and explored ways to engage in daily self-care. Discussed 'cancelling his subscription' to his negative self-talk, which is likely reinforcing situations that he anticipates will increase emotional dysregulation.      Treatment Objective(s) Addressed in This Session:   identify three distraction and diversion activities and use those activities to decrease level of anxiety   and use cognitive strategies identified in therapy to challenge anxious thoughts, Increase interest, engagement, and pleasure in doing things  Decrease frequency and intensity of feeling down, depressed, hopeless  Identify negative self-talk and behaviors: challenge core beliefs, myths, and actions, identify 5 traits or charcteristics you like about yourself, practice one mindfulness skill each day for 5 minutes and reduce their emotional vulnerability by 40% during the Emotion Regulation Module (6-8 weeks)      Intervention:      CBT: cognitive challenging, restructuring, reframing Behavioral activation techniques,  grounding strategies  PCT: supportive autonomy, unconditional positive regard, empathic reflection, active listening  DBT: self-soothing with the 5 senses, TIP Skill, IMPROVE the moment, assertive communication/DEAR MAN    Assessments completed prior to visit:  The following assessments were completed by patient for this visit:  The following assessments were completed by patient for this visit:  PHQ9:       1/28/2025     8:32 AM 2/18/2025    10:29 AM 3/4/2025     2:20 PM 3/12/2025    11:35 AM 4/15/2025     2:32 PM 4/22/2025     2:28 PM 4/29/2025     3:35 PM   PHQ-9 SCORE   PHQ-9 Total Score MyChart 10 (Moderate depression) 10 (Moderate depression) 6 (Mild depression) 8 (Mild depression) 7 (Mild depression) 10 (Moderate depression) 9 (Mild depression)   PHQ-9 Total Score 10  10  6  8  7  10  9        Patient-reported     GAD7:       12/3/2024    12:14 PM 12/17/2024     2:51 PM 1/1/2025     3:50 PM 2/18/2025     2:18 PM 3/12/2025    11:39 AM 4/15/2025     2:35 PM 4/29/2025     3:35 PM   YUDI-7 SCORE   Total Score 9 (mild anxiety) 12 (moderate anxiety) 8 (mild anxiety) 10 (moderate anxiety) 15 (severe anxiety) 12 (moderate anxiety) 13 (moderate anxiety)   Total Score 9  12  8  10  15  12  13        Patient-reported         ASSESSMENT: Current Emotional / Mental Status (status of significant symptoms):   Risk status (Self / Other harm or suicidal ideation)   Patient reports the following current fears or concerns for personal safety: Ongoing passive thoughts of suicide with absence of plan or intent and agreed to follow his previously developed [facility] Safety Plan.   Patient denies current or recent suicidal ideation or behaviors.   Patient denies current or recent homicidal ideation or behaviors.   Patient denies current or recent self injurious behavior or ideation.   Patient denies other safety concerns.   Patient reports there has been no change in risk factors since their last session.     Patient reports  there has been no change in protective factors since their last session.     A safety and risk management plan has been developed including: Patient consented to co-developed safety plan on 10/21/2024.  Safety and risk management plan was reviewed.   Patient agreed to use safety plan should any safety concerns arise.  A copy was made available to the patient.     Appearance:   Appropriate    Eye Contact:   Fair    Psychomotor Behavior: Normal    Attitude:   Cooperative    Orientation:   All   Speech    Rate / Production: Normal/ Responsive    Volume:  Normal    Mood:    Anxious  Depressed  Irritable  Sad    Affect:    Subdued  Worrisome    Thought Content:  Clear    Thought Form:  Coherent    Insight:    Fair      Medication Review:   Changes to psychiatric medications, see updated Medication List in EPIC.       Medication Compliance:   Yes - may not take some PRNs as often as he could     Changes in Health Issues:   None reported     Chemical Use Review:   Substance Use: Chemical use reviewed, no active concerns identified      Tobacco Use: No current tobacco use.      Diagnosis:  296.32 (F33.1) Major Depressive Disorder, Recurrent Episode, Moderate _.  300.02 (F41.1) Generalized Anxiety Disorder.    Collateral Reports Completed:   Not Applicable    PLAN: (Patient Tasks / Therapist Tasks / Other)    Patient to continue to use non-judgmental stance and cope ahead skills.     Patient and provider will continue practicing how to reframe automatic thinking from negative situations. Patient to cultivate an awareness of anticipating triggers and predicting it will cause emotional distress.    Patient will practice radical self-compassion techniques to decrease shame.     Patient to engage in basic self-cares and focus on present moment.     Patient to explore strategies to increase motivation.     Shirley Bartlett PsyD LP                                                       ______________________________________________________________________    Individual Treatment Plan    Patient's Name: Joel Pineda  YOB: 1975    Date of Creation: October 30, 2024   Date Treatment Plan Last Reviewed/Revised: October 30, 2024; January 29, 2025; April 30, 2025     DSM5 Diagnoses: 296.32 (F33.1) Major Depressive Disorder, Recurrent Episode, Moderate _ or 300.02 (F41.1) Generalized Anxiety Disorder  Psychosocial / Contextual Factors: Medical complexities, Trauma history, Interpersonal concerns.    recent surgery and resulting acute pain, chronic pain, chronic mental health concerns, mobility limitations  PROMIS (reviewed every 90 days):   PROMIS-10 Scores        1/15/2025    12:04 PM 2/11/2025     2:54 PM 2/18/2025     2:19 PM   PROMIS-10 Total Score w/o Sub Scores   PROMIS TOTAL - SUBSCORES 21  21  18        Patient-reported        Referral / Collaboration:  Referral to another professional/service is not indicated at this time..    Anticipated number of session for this episode of care: 9-12 sessions  Anticipation frequency of session: Weekly  Anticipated Duration of each session: 53 or more minutes  Treatment plan will be reviewed in 90 days or when goals have been changed.     MeasurableTreatment Goal(s) related to diagnosis / functional impairment(s)  Goal 1: Patient will decrease anxiety by 75% as evidenced by YUDI-7    I will know I've met my goal when I am at peace with where I'm at.       Objective #A (Patient Action)                          Patient will identify the situations / thoughts that contribute to feeling anxious.  Status: Continued - Date(s):1/29/2025, 4/30/2025       Intervention(s)  Therapist will process anxiety-proving emotions.     Objective #B  Patient will use at least 3 coping skills for anxiety management in the next 4 weeks.  Status: Continued - Date(s):1/29/2025, 4/30/2025        Intervention(s)  Therapist will assign homework : coping skills practice to  decrease anxiety .     Objective #C  Patient will use cognitive strategies identified in therapy to challenge anxious thoughts.  Status: Continued - Date(s):1/29/2025, 4/30/2025        Intervention(s)  Therapist will  teach the patient ways to reframe negative core beliefs that contribute to anxiety.     Goal 2 Patient will report pain levels are consistently rated at 2/10 per patient report.   I will know I've met my goal when my pain is less disruptive.      Objective #A (Patient Action)    Patient will identify 5 strategies for managing pain.  Status: Continued - Date(s):1/29/2025, 4/30/2025     Intervention(s)  Therapist will teach strategies to manage pain and assign homework to use 3 strategies daily.    Objective #B  Patient will engage in physical activity and PT exercises 2-3 times daily.  Status: Continued - Date(s):1/29/2025, 4/30/2025      Intervention(s)  Therapist will assign homework to engage in at minimum one PT or physical activity daily.    Objective #C  Patient will Identify negative self-talk and behaviors: challenge core beliefs, myths, and actions.  Status: Continued - Date(s):1/29/2025, 4/30/2025      Intervention(s)  Therapist will teach non-judgmental stance and help patient reframe negative self-talk .      Goal 3: Patient will report reduction in depressive symptoms as evidenced by PHQ-9 score reduction of 75% or greater.     I will know I've met my goal when I feel like I have purpose in life.      Objective #A (Patient Action)    Status: Continued - Date(s):1/29/2025, 4/30/2025      Patient will Decrease frequency and intensity of feeling down, depressed, hopeless.    Intervention(s)  Therapist will teach emotional recognition/identification. Therapist will reinforce use of emotion regulation skills.    Objective #B  Patient will use healthy communication when describing his emotions or when setting boundaries with others.     Status: Continued - Date(s):1/29/2025, 4/30/2025       Intervention(s)  Therapist will teach emotional regulation skills and interpersonal effectiveness skills to improve quality of communication. Therapist will teach assertive communication skills.    Objective #C  Patient will track and record at least 3 pleasant exchanges with family members such as mother, father, sister, brother.  Status: Continued - Date(s):1/29/2025, 4/30/2025     Intervention(s)  Therapist will teach about healthy boundaries. Therapist will encourage patient to focus on positive interactions with family and use cope ahead to increase likelihood of pleasant experience as an outcome.    Patient has reviewed and agreed to the above plan.      Shirley Bartlett PsyD LP  April 30, 2025   Answers submitted by the patient for this visit:  Patient Health Questionnaire (Submitted on 12/3/2024)  If you checked off any problems, how difficult have these problems made it for you to do your work, take care of things at home, or get along with other people?: Very difficult  PHQ9 TOTAL SCORE: 10  Patient Health Questionnaire (G7) (Submitted on 12/3/2024)  YUDI 7 TOTAL SCORE: 9    Answers submitted by the patient for this visit:  Patient Health Questionnaire (Submitted on 12/17/2024)  If you checked off any problems, how difficult have these problems made it for you to do your work, take care of things at home, or get along with other people?: Very difficult  PHQ9 TOTAL SCORE: 15  Patient Health Questionnaire (G7) (Submitted on 12/17/2024)  YUDI 7 TOTAL SCORE: 12    Answers submitted by the patient for this visit:  Patient Health Questionnaire (Submitted on 1/14/2025)  If you checked off any problems, how difficult have these problems made it for you to do your work, take care of things at home, or get along with other people?: Somewhat difficult  PHQ9 TOTAL SCORE: 10    [facility] Safety [facility]      Creation Date: 10/21/24       Step 1: Warning signs:    Warning Signs    Start thinking about death more       Step 2: Internal coping strategies - Things I can do to take my mind off my problems without contacting another person:    Strategies    Petting cats    Watching music videos    talk to family member    text friend Tono    hot bubble bath      Step 3: People and social settings that provide distraction:    Name Contact Information    Tono - dejan [phone]    Mother [phone]         Step 4: People whom I can ask for help during a crisis:    Name Contact Information    Keyana - sister [phone]      Step 5: Professionals or agencies I can contact during a crisis:    Clinician/Agency Name Phone Emergency Contact    Shirley Bartlett [phone]    ANU CHENEY        Local Emergency Department Emergency Department Address Emergency Department Phone    [facility]       Suicide Prevention Lifeline Phone: Call or Text 469  Crisis Text Line: Text HOME to 103890     Step 6: Making the environment safer (plan for lethal means safety):   Previous medications - dispose of unused or older medications     Optional: What is most important to me and worth living for?:   Niece  Mother  Sister  Cats     [place] Safety Plan. Shanita Lazcano and Ariel Lorenzo. Used with permission of the authors.          Answers submitted by the patient for this visit:  Patient Health Questionnaire (Submitted on 4/29/2025)  If you checked off any problems, how difficult have these problems made it for you to do your work, take care of things at home, or get along with other people?: Somewhat difficult  PHQ9 TOTAL SCORE: 9  Patient Health Questionnaire (G7) (Submitted on 4/29/2025)  YUDI 7 TOTAL SCORE: 13

## 2025-05-15 ENCOUNTER — OFFICE VISIT (OUTPATIENT)
Dept: PALLIATIVE MEDICINE | Facility: OTHER | Age: 50
End: 2025-05-15
Payer: MEDICARE

## 2025-05-15 VITALS
HEART RATE: 78 BPM | WEIGHT: 288 LBS | OXYGEN SATURATION: 95 % | DIASTOLIC BLOOD PRESSURE: 72 MMHG | SYSTOLIC BLOOD PRESSURE: 115 MMHG | BODY MASS INDEX: 35.06 KG/M2

## 2025-05-15 DIAGNOSIS — M62.838 MUSCLE SPASM: ICD-10-CM

## 2025-05-15 DIAGNOSIS — M45.8 ANKYLOSING SPONDYLITIS OF SACRAL REGION (H): ICD-10-CM

## 2025-05-15 DIAGNOSIS — G62.9 PERIPHERAL POLYNEUROPATHY: ICD-10-CM

## 2025-05-15 DIAGNOSIS — M47.816 LUMBAR FACET ARTHROPATHY: ICD-10-CM

## 2025-05-15 DIAGNOSIS — G89.29 CHRONIC INTRACTABLE PAIN: Primary | ICD-10-CM

## 2025-05-15 DIAGNOSIS — G43.111 INTRACTABLE MIGRAINE WITH AURA WITH STATUS MIGRAINOSUS: ICD-10-CM

## 2025-05-15 PROCEDURE — G0463 HOSPITAL OUTPT CLINIC VISIT: HCPCS | Performed by: NURSE PRACTITIONER

## 2025-05-15 PROCEDURE — 3074F SYST BP LT 130 MM HG: CPT | Performed by: NURSE PRACTITIONER

## 2025-05-15 PROCEDURE — 3078F DIAST BP <80 MM HG: CPT | Performed by: NURSE PRACTITIONER

## 2025-05-15 PROCEDURE — 1125F AMNT PAIN NOTED PAIN PRSNT: CPT | Performed by: NURSE PRACTITIONER

## 2025-05-15 PROCEDURE — 99213 OFFICE O/P EST LOW 20 MIN: CPT | Performed by: NURSE PRACTITIONER

## 2025-05-15 RX ORDER — OXYCODONE HYDROCHLORIDE 5 MG/1
5 TABLET ORAL EVERY 6 HOURS PRN
Qty: 60 TABLET | Refills: 0 | Status: SHIPPED | OUTPATIENT
Start: 2025-05-15

## 2025-05-15 RX ORDER — CYCLOBENZAPRINE HCL 5 MG
5 TABLET ORAL 3 TIMES DAILY PRN
Qty: 90 TABLET | Refills: 1 | Status: SHIPPED | OUTPATIENT
Start: 2025-05-15

## 2025-05-15 ASSESSMENT — PAIN SCALES - GENERAL: PAINLEVEL_OUTOF10: MODERATE PAIN (4)

## 2025-05-15 NOTE — PATIENT INSTRUCTIONS
After Visit Instructions:     Thank you for coming to [facility] for your care. It is my goal to partner with you to help you reach your optimal state of health.   Continue daily self-care, identifying contributing factors, and monitoring variations in pain level. Continue to integrate self-care into your life.      Health Psychology: YES Per Shirley's recommendation.   30 minutes Video or Clinic follow-up with JOCELYN Zavala NP-C in 2 months or sooner as needed.   Referrals: Call Dr Biggs's office and make an appointment due return of pre-surgery symptoms.   Medication Management :   Cyclobenzaprine 5 m-2 tabs 3 times per day as needed for muscle spasms.     JOCELYN Padgett, NP-C  [facility]  [facility] - Monday, Thursday and Friday  [facility] - Tuesday      Be sure to request ALL medication refills 5 days prior to the due date whether or not you will see your medical provider in an appointment before the due date.      Do not expect same day refills. If you do not plan ahead you may run out of medications.     Early refills are not provided.  It is your responsibility to manage your medications responsibility and keep them safely stored. Lost or destroyed medications WILL NOT be replaced    Scheduling/Clinic telephone Number for ALL locations:  [phone]    After Hours On-Call Service:  [phone]    Call with any questions about your care and for scheduling assistance.   Calls are returned Monday through Friday between 8 AM and 4:00 PM. We usually get back to you within 2 business days depending on the issue/request.    If we are prescribing your medications:  For opioid medication refills, call the clinic or send a [phone] message 7 days in advance.  Please include:  Your name and date of birth.   Name of requested medication  Name of the pharmacy.  For non-opioid medications, call your pharmacy directly to request a refill. Please  allow 3-4 days to be processed.   Per [place] State Law:  All controlled substance prescriptions must be filled within 30 days of being written.    For those controlled substances allowing refills, pickup must occur within 30 days of last fill.      We believe regular attendance is key to your success in our program!    Any time you are unable to keep your appointment we ask that you call us at least 24 hours in advance to cancel.This will allow us to offer the appointment time to another patient.   Multiple missed appointments may lead to dismissal from the clinic.

## 2025-05-15 NOTE — PROGRESS NOTES
"[facility]    CHIEF COMPLAINT: Chronic Pain.    INTERVAL HISTORY:  Last seen on 3/27/25.       Recommendations/plan at the last visit included:  Health Psychology: YES, Continue per your psychologist's recommendations.  Physical therapy: YES, Continue per therapist's recommendations.   30 minute Video or Clinic follow-up with JOCELYN Zavala NP-C in 3 months  Procedures recommended: Bilateral Femoral Nerve Blocks with Dr Montgomery. We will call you to schedule.   Medication Management :   Continue current medication plan.   Ok to take an extra Pregabalin 150 mg as needed at bedtime for severe shingles pain.     Since last visit:   - 416/2025: Bilateral lateral femoral cutaneous nerve block with Dr Montgomery: [facility] 75% less pain after   - Having pain and \"bee stings\" sensations that he had prior to 9/24 surgery.   - Increased stress related to his mother's severe health scare. 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   - Emgality has been helpful. Down to 1 HA per week vs 4 prior to Emgality     Pain Information today: Moderate Pain (4)/10. Location of pain: low back, legs,     Annual Requirements last collected: January 27, 2025      Current Pain Relevant Medications:    Acetaminophen 500 mg BID & with Oxycodone.   Cymbalta 120 mg in AM  Lyrica 300 MG BID   Methocarbamol 500 mg 1-2 QID PRN  Nabumetone 500 mg 1-2 times a day  Lidocaine gel topically 2-3 times daily  Risperdal 0.5 mg 1-2 x daily      Pain Related Controlled Substances:   Clonazepam 0.5 mg, 2 tabs at HS.  Lunesta 3 mg at HS   Oxycodone 5 mg 1-2 tabs per day PRN              Total opiate dose: 7.5-15 MME     Previous Pain Relevant Medications: (H--helped; HI--Helped initially; [facility]--Somewhat helpful; NH--No help; W--worse; SE--side effects; ?--Unsure if helpful)   NOTE: This medication information taken from patient's intake form, not medical records.   Opiates: Oxycodone: H, Tramadol:[facility]. SE, Codeine: NH  NSAIDS: Celebrex: [facility], " Nabumetone:H, Naproxen: SE  Anti-migraine medications: Midrin? , Maxalt:H  Muscle Relaxants: Baclofen:SW, Flexeril:H, Tizaidine:?, Methocarbamol:HI  Neuropathics: Lyrica:H  Anti-depressants: Abilify:SE, Brintellix: NH, Wellbutrin: ?, Cymbalta: NH, Nortriptyline: NH, Seroquel:   [facility], Risperdal: NH, Effexor:?, Cymbalta ?  Anxiety medications: Xanax: H, Klonpin: H, lorazepam: H      Topicals: Lidocaine:H  Sleep medications:Belsomra: NH, Melatonin:H, Trazodone NH, Ambien:NH  Other medications not covered above: Humira: All, Enbrel; H, dicyclomine:H, Medrol:[facility], Prednisone:H     Any illicit drug use: denies   EtOH use: none   Caffeine use: 6-8 per day   Nicotine use: None     Past Pain Treatments:   Pain Clinic:   Yes  [facility] pain management: For spinal injections with Dr Diaz, [facility]: injections, nerve ablation, [facility] for SCS treatment.  Did trial but did not find it beneficial.   [facility] chronic pain program.    PT: Yes  For other reasons. Neck, back and feet  Psychologist: Yes ongoing with private therapist[facility]at  [facility].   Chiropractor: Yes years ag              Acupuncture: Yes NH  Pharmacotherapy:  Opioids: Yes  Non-opioids:    Yes   TENs Unit:Yes H for back   Injections: Yes Many for neck and back over the years.      Surgeries related to pain: Yes   October 2007 L5-S1 laminectomy, micro discectomy          THE 4 As OF OPIOID MAINTENANCE ANALGESIA    Analgesia: Is pain relief clinically significant? YES   Activity: Is patient functional and able to perform Activities of Daily Living? YES   Adverse effects: Is patient free from adverse side effects from opiates? YES   Adherence to Rx protocol: Is patient adhering to Controlled Substance Agreement and taking medications ONLY as ordered? YES     Is Narcan prescribed for opiate use >50 MME daily or concurrent use of opiates and benzodiazepines?  Yes    [place] Board of Pharmacy Data Base Reviewed:    YES; No concern for abuse or misuse of controlled  medications based on this report. Reviewed [facility] May 14, 2025- no concerning fills.      PHYSICAL EXAM    Vitals:    05/15/25 1326   BP: 115/72   Pulse: 78   SpO2: 95%   Weight: 130.6 kg (288 lb)       Constitutional: healthy, alert, and mild distress A&O.   Patient is appropriate.  Psychiatric/mental status: Alert, without lethargy or stupor. Appropriate affect.     Neurologic exam:  CN:  Cranial nerves 2-12 are grossly normal.    MUSCULOSKELETAL:     Posture: Upright, shoulders and pelvis are leveled. No  Antalgic Gait Pattern?: Yes     Thoracic spine:    Kyphosis. Yes   Tenderness in the thoracic spine at midline. No   Tenderness in the thoracic paraspinal muscles. No  Lumbar/Sacral spine:   Forward Flexion:  90+ degrees, pain free    Ext: 0 degrees, painful    Rotation/ext to right: painful    Rotation/ext to left: painful    Lordosis. Yes   Tenderness in the lumbar spine at midline. Yes   Tenderness in the lumbar paraspinal muscles.Yes      DIRE Score for ongoing opioid management is calculated as follows:    Diagnosis = 3 pts (advanced condition; severe pain/objective findings)    Intractability = 3 pts (patient fully engaged but inadequate response to treatments)    Risk        Psych = 3 pts (no significant personality dysfunction/mental illness; good communication with clinic)         Chem Hlth = 3 pts (no history of chemical dependency; not drug-focused)       Reliability = 2 pts (occasional difficulties with compliance; generally reliable)       Social = 2 pts (reduction in some relationships/life rolls)       (Psych + Chem hlth + Reliability + Social) = 16    Efficacy = 2 pts (moderate benefit/function; low med dose; too early/not tried meds)    DIRE Score = 18        7-13: likely NOT suitable candidate for long-term opioid analgesia       14-21: may be a suitable candidate for long-term opioid analgesia     Previous Diagnostic Tests:   Imaging Studies:      7/17/2024: MR LUMBAR SPINE W/O CONTRAST    IMPRESSION: Interval progression of multilevel lumbar spondylosis, most pronounced at L4-L5 and L5-S1, when compared to 2019. There is moderate to severe left neuroforaminal stenosis at L4-L5 with  abutment of the exiting left L4 nerve. Additional moderate to severe  bilateral neuroforaminal stenosis with abutment of the exiting  bilateral L5 nerves at L5-S1 and abutment of the descending left S1  nerve root.         PAIN RELEVANT CONDITIONS:   1.  Ankylosing spondylosis, Lumbar DDD with left S1 nerve involvement, lumbar stenosis  2.  Chronic migraine headaches  3.  Chronic cervical pain, DDD  4.  Peripheral small fiber neuropathy    DIAGNOSIS AND PLAN:     ***      Hypertension: Patient to follow up with Primary Care provider regarding elevated blood pressure.    PATIENT INSTRUCTIONS:     Diagnosis reviewed, treatment option addressed, and risk/benefits discussed.  Self-care instructions given.  I am recommending a multidisciplinary treatment plan to help this patient better manage pain.    Remember to request ALL medication refills 5 BUSINESS days before you run out.     Health Psychology: YES Per Shirley's recommendation.   30 minutes Video or Clinic follow-up with JOCELYN Zavala, NPAIDAN in 2 months or sooner as needed.   Referrals: Call Dr Biggs's office and make an appointment due return of pre-surgery symptoms.   Medication Management :   Cyclobenzaprine 5 m-2 tabs 3 times per day as needed for muscle spasms.     I have reviewed the note as documented above.  This accurately captures the substance of my conversation with the patient.  A total of *** minutes of preparation, care, and consultation were spent on this visit today.     JOCELYN Padgett, NP-C  [facility]    (Information in italics and blue color are taken from previous pain and consulting medical providers notes and are documented as such)                                       accurately captures the substance of my conversation with the patient.  A total of 26 minutes of preparation, care, and consultation were spent on this visit today.     JOCELYN Padgett, NP-C  [facility]    (Information in italics and blue color are taken from previous pain and consulting medical providers notes and are documented as such)

## 2025-05-21 ENCOUNTER — VIRTUAL VISIT (OUTPATIENT)
Facility: CLINIC | Age: 50
End: 2025-05-21
Payer: MEDICARE

## 2025-05-21 DIAGNOSIS — F33.1 MAJOR DEPRESSIVE DISORDER, RECURRENT EPISODE, MODERATE (H): Primary | ICD-10-CM

## 2025-05-21 DIAGNOSIS — F41.1 GENERALIZED ANXIETY DISORDER: ICD-10-CM

## 2025-05-21 PROCEDURE — 90837 PSYTX W PT 60 MINUTES: CPT | Mod: 95 | Performed by: PSYCHOLOGIST

## 2025-05-21 NOTE — PROGRESS NOTES
[facility]                                     Progress Note    Patient Name: Joel Pineda  Date: May 21, 2025             Service Type: Individual      Session Start Time: 2:01 PM Session End Time: 2:55 PM     Session Length:  54 minutes    Session #: 25    Attendees: Client attended alone    Service Modality:  Video Visit:      Provider verified identity through the following two step process.  Patient provided:  Patient photo and Patient is known previously to provider    Telemedicine Visit: The patient's condition can be safely assessed and treated via synchronous audio and visual telemedicine encounter.      Reason for Telemedicine Visit: Patient convenience (e.g. access to timely appointments / distance to available provider)    Originating Site (Patient Location): Patient's home    Distant Site (Provider Location): Provider Remote Setting- Home Office    Consent:  The patient/guardian has verbally consented to: the potential risks and benefits of telemedicine (video visit) versus in person care; bill my insurance or make self-payment for services provided; and responsibility for payment of non-covered services.     Patient would like the video invitation sent by:  Text to cell phone: [phone]    Mode of Communication:  Video Conference via Amwell    Distant Location (Provider):  Off-site    As the provider I attest to compliance with applicable laws and regulations related to telemedicine.    DATA  Interactive Complexity: Yes, visit entailed Interactive Complexity evidenced by: Patient's presenting concerns require more intensive intervention than could be completed within the usual service. Provider used clinical judgment in determining the necessary length for the session to benefit the patient's needs. Teaching/review of DBT skills.    Crisis: No        Progress Since Last Session (Related to Symptoms / Goals / Homework):   Symptoms: No change Tito continues to report low  mood, ongoing anxiety and panic.    Homework: Achieved / completed to satisfaction - using DBT skills for emotion regulation.      Episode of Care Goals: Satisfactory progress - CONTEMPLATION (Considering change and yet undecided); Intervened by assessing the negative and positive thinking (ambivalence) about behavior change     Current / Ongoing Stressors and Concerns:   Tito reports his [employer] PT is being extended. He has been experiencing more dry mouth, working with his MTM provider to discuss options, also discussed sour things even as simple as adding some lemon juice to his water could help. His mother has another CT scan tomorrow to determine if she continues to have a leaky valve. He continues to have 'bee sting' sensations, leg gave out on him once. He cancelled his visit with orthopedic surgeon as he was worried about Medicare coverage if he were to be seen by both JOCELYN Zavala CNP and Anitha Brewster MD on same day. He shares about being encouraged to discontinue engagement with [employer] at last visit with Stacia. 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 He reports feeling he needs time to think about the 4 square pros/cons. Admits he has anticipatory anxiety about upcoming '[employer]' his Board colleague called, cope ahead     Treatment Objective(s) Addressed in This Session:   identify three distraction and diversion activities and use those activities to decrease level of anxiety   and use cognitive strategies identified in therapy to challenge anxious thoughts, Increase interest, engagement, and pleasure in doing things  Decrease frequency and intensity of feeling down, depressed, hopeless  Identify negative self-talk and behaviors: challenge core beliefs, myths, and actions, identify 5 traits or charcteristics you like about yourself, practice one mindfulness skill each day for 5 minutes and reduce their emotional vulnerability by 40% during the Emotion Regulation  Module (6-8 weeks)      Intervention:      CBT: cognitive challenging, restructuring, reframing Behavioral activation techniques, grounding strategies  PCT: supportive autonomy, unconditional positive regard, empathic reflection, active listening  DBT: self-soothing with the 5 senses, TIP Skill, IMPROVE the moment, assertive communication/DEAR MAN    Assessments completed prior to visit:  The following assessments were completed by patient for this visit:  The following assessments were completed by patient for this visit:  PHQ9:       1/28/2025     8:32 AM 2/18/2025    10:29 AM 3/4/2025     2:20 PM 3/12/2025    11:35 AM 4/15/2025     2:32 PM 4/22/2025     2:28 PM 4/29/2025     3:35 PM   PHQ-9 SCORE   PHQ-9 Total Score MyChart 10 (Moderate depression) 10 (Moderate depression) 6 (Mild depression) 8 (Mild depression) 7 (Mild depression) 10 (Moderate depression) 9 (Mild depression)   PHQ-9 Total Score 10  10  6  8  7  10  9        Patient-reported     GAD7:       12/3/2024    12:14 PM 12/17/2024     2:51 PM 1/1/2025     3:50 PM 2/18/2025     2:18 PM 3/12/2025    11:39 AM 4/15/2025     2:35 PM 4/29/2025     3:35 PM   YUDI-7 SCORE   Total Score 9 (mild anxiety) 12 (moderate anxiety) 8 (mild anxiety) 10 (moderate anxiety) 15 (severe anxiety) 12 (moderate anxiety) 13 (moderate anxiety)   Total Score 9  12  8  10  15  12  13        Patient-reported         ASSESSMENT: Current Emotional / Mental Status (status of significant symptoms):   Risk status (Self / Other harm or suicidal ideation)   Patient reports the following current fears or concerns for personal safety: Ongoing passive thoughts of suicide with absence of plan or intent and agreed to follow his previously developed [place] Safety Plan.   Patient denies current or recent suicidal ideation or behaviors.   Patient denies current or recent homicidal ideation or behaviors.   Patient denies current or recent self injurious behavior or ideation.   Patient denies  other safety concerns.   Patient reports there has been no change in risk factors since their last session.     Patient reports there has been no change in protective factors since their last session.     A safety and risk management plan has been developed including: Patient consented to co-developed safety plan on 10/21/2024.  Safety and risk management plan was reviewed.   Patient agreed to use safety plan should any safety concerns arise.  A copy was made available to the patient.     Appearance:   Appropriate    Eye Contact:   Fair    Psychomotor Behavior: Normal    Attitude:   Cooperative    Orientation:   All   Speech    Rate / Production: Normal/ Responsive    Volume:  Normal    Mood:    Anxious  Depressed  Sad    Affect:    Subdued  Worrisome    Thought Content:  Clear  Perseverative   Thought Form:  Coherent    Insight:    Fair      Medication Review:   Changes to psychiatric medications, see updated Medication List in EPIC.       Medication Compliance:   Yes - may not take some PRNs as often as he could     Changes in Health Issues:   None reported     Chemical Use Review:   Substance Use: Chemical use reviewed, no active concerns identified      Tobacco Use: No current tobacco use.      Diagnosis:  296.32 (F33.1) Major Depressive Disorder, Recurrent Episode, Moderate _.  300.02 (F41.1) Generalized Anxiety Disorder.    Collateral Reports Completed:   Not Applicable    PLAN: (Patient Tasks / Therapist Tasks / Other)    Patient to continue to use non-judgmental stance and cope ahead skills.     Patient and provider will continue practicing how to reframe automatic thinking from negative situations. Patient to cultivate an awareness of anticipating triggers and predicting it will cause emotional distress.    Patient will practice radical self-compassion techniques to decrease shame.     Patient to engage in basic self-cares and focus on present moment.     Patient to explore strategies to increase  motivation.     Patient to continue to use Polo Ahead skill for situations he anticipates will have negative outcome or evoke strong emotional response.     Shirley Bartlett PsyD LP                                                      ______________________________________________________________________    Individual Treatment Plan    Patient's Name: Joel Pineda  YOB: 1975    Date of Creation: October 30, 2024   Date Treatment Plan Last Reviewed/Revised: October 30, 2024; January 29, 2025; April 30, 2025     DSM5 Diagnoses: 296.32 (F33.1) Major Depressive Disorder, Recurrent Episode, Moderate _ or 300.02 (F41.1) Generalized Anxiety Disorder  Psychosocial / Contextual Factors: Medical complexities, Trauma history, Interpersonal concerns.    recent surgery and resulting acute pain, chronic pain, chronic mental health concerns, mobility limitations  PROMIS (reviewed every 90 days):   PROMIS-10 Scores        2/11/2025     2:54 PM 2/18/2025     2:19 PM 5/20/2025     5:16 PM   PROMIS-10 Total Score w/o Sub Scores   PROMIS TOTAL - SUBSCORES 21  18  19        Patient-reported        Referral / Collaboration:  Referral to another professional/service is not indicated at this time..    Anticipated number of session for this episode of care: 9-12 sessions  Anticipation frequency of session: Weekly  Anticipated Duration of each session: 53 or more minutes  Treatment plan will be reviewed in 90 days or when goals have been changed.     MeasurableTreatment Goal(s) related to diagnosis / functional impairment(s)  Goal 1: Patient will decrease anxiety by 75% as evidenced by YUDI-7    I will know I've met my goal when I am at peace with where I'm at.       Objective #A (Patient Action)                          Patient will identify the situations / thoughts that contribute to feeling anxious.  Status: Continued - Date(s):1/29/2025, 4/30/2025       Intervention(s)  Therapist will process anxiety-proving emotions.      Objective #B  Patient will use at least 3 coping skills for anxiety management in the next 4 weeks.  Status: Continued - Date(s):1/29/2025, 4/30/2025        Intervention(s)  Therapist will assign homework : coping skills practice to decrease anxiety .     Objective #C  Patient will use cognitive strategies identified in therapy to challenge anxious thoughts.  Status: Continued - Date(s):1/29/2025, 4/30/2025        Intervention(s)  Therapist will  teach the patient ways to reframe negative core beliefs that contribute to anxiety.     Goal 2 Patient will report pain levels are consistently rated at 2/10 per patient report.   I will know I've met my goal when my pain is less disruptive.      Objective #A (Patient Action)    Patient will identify 5 strategies for managing pain.  Status: Continued - Date(s):1/29/2025, 4/30/2025     Intervention(s)  Therapist will teach strategies to manage pain and assign homework to use 3 strategies daily.    Objective #B  Patient will engage in physical activity and PT exercises 2-3 times daily.  Status: Continued - Date(s):1/29/2025, 4/30/2025      Intervention(s)  Therapist will assign homework to engage in at minimum one PT or physical activity daily.    Objective #C  Patient will Identify negative self-talk and behaviors: challenge core beliefs, myths, and actions.  Status: Continued - Date(s):1/29/2025, 4/30/2025      Intervention(s)  Therapist will teach non-judgmental stance and help patient reframe negative self-talk .      Goal 3: Patient will report reduction in depressive symptoms as evidenced by PHQ-9 score reduction of 75% or greater.     I will know I've met my goal when I feel like I have purpose in life.      Objective #A (Patient Action)    Status: Continued - Date(s):1/29/2025, 4/30/2025      Patient will Decrease frequency and intensity of feeling down, depressed, hopeless.    Intervention(s)  Therapist will teach emotional recognition/identification. Therapist  will reinforce use of emotion regulation skills.    Objective #B  Patient will use healthy communication when describing his emotions or when setting boundaries with others.     Status: Continued - Date(s):1/29/2025, 4/30/2025      Intervention(s)  Therapist will teach emotional regulation skills and interpersonal effectiveness skills to improve quality of communication. Therapist will teach assertive communication skills.    Objective #C  Patient will track and record at least 3 pleasant exchanges with family members such as mother, father, sister, brother.  Status: Continued - Date(s):1/29/2025, 4/30/2025     Intervention(s)  Therapist will teach about healthy boundaries. Therapist will encourage patient to focus on positive interactions with family and use cope ahead to increase likelihood of pleasant experience as an outcome.    Patient has reviewed and agreed to the above plan.      Shirley Bartlett PsyD LP  April 30, 2025   Answers submitted by the patient for this visit:  Patient Health Questionnaire (Submitted on 12/3/2024)  If you checked off any problems, how difficult have these problems made it for you to do your work, take care of things at home, or get along with other people?: Very difficult  PHQ9 TOTAL SCORE: 10  Patient Health Questionnaire (G7) (Submitted on 12/3/2024)  YUDI 7 TOTAL SCORE: 9    Answers submitted by the patient for this visit:  Patient Health Questionnaire (Submitted on 12/17/2024)  If you checked off any problems, how difficult have these problems made it for you to do your work, take care of things at home, or get along with other people?: Very difficult  PHQ9 TOTAL SCORE: 15  Patient Health Questionnaire (G7) (Submitted on 12/17/2024)  YUDI 7 TOTAL SCORE: 12    Answers submitted by the patient for this visit:  Patient Health Questionnaire (Submitted on 1/14/2025)  If you checked off any problems, how difficult have these problems made it for you to do your work, take care of things  at home, or get along with other people?: Somewhat difficult  PHQ9 TOTAL SCORE: 10    [place] Safety Plan      Creation Date: 10/21/24       Step 1: Warning signs:    Warning Signs    Start thinking about death more      Step 2: Internal coping strategies - Things I can do to take my mind off my problems without contacting another person:    Strategies    Petting cats    Watching music videos    talk to family member    text friend Tono    hot bubble bath      Step 3: People and social settings that provide distraction:    Name Contact Information    Tono - friend [phone]    Mother [phone]         Step 4: People whom I can ask for help during a crisis:    Name Contact Information    Keyana - sister [phone]      Step 5: Professionals or agencies I can contact during a crisis:    Clinician/Agency Name Phone Emergency Contact    Shirley Bartlett [phone]    ANU CHENEY        [facility] Emergency Department Emergency Department Address Emergency Department Phone    [facility]       Suicide Prevention Lifeline Phone: Call or Text 692  Crisis Text Line: Text HOME to 658233     Step 6: Making the environment safer (plan for lethal means safety):   Previous medications - dispose of unused or older medications     Optional: What is most important to me and worth living for?:   Niece  Mother  Sister  Jag     [place] Safety Plan. Shanita Lazcano and Ariel Lorenzo. Used with permission of the authors.          Answers submitted by the patient for this visit:  Patient Health Questionnaire (Submitted on 4/29/2025)  If you checked off any problems, how difficult have these problems made it for you to do your work, take care of things at home, or get along with other people?: Somewhat difficult  PHQ9 TOTAL SCORE: 9  Patient Health Questionnaire (G7) (Submitted on 4/29/2025)  YUDI 7 TOTAL SCORE: 13

## 2025-05-24 DIAGNOSIS — G89.29 CHRONIC INTRACTABLE PAIN: ICD-10-CM

## 2025-05-24 DIAGNOSIS — M79.18 MYOFASCIAL MUSCLE PAIN: ICD-10-CM

## 2025-05-27 RX ORDER — NABUMETONE 500 MG/1
TABLET, FILM COATED ORAL
Qty: 120 TABLET | Refills: 0 | Status: SHIPPED | OUTPATIENT
Start: 2025-05-27

## 2025-05-28 ENCOUNTER — LAB (OUTPATIENT)
Dept: LAB | Facility: CLINIC | Age: 50
End: 2025-05-28
Payer: MEDICARE

## 2025-05-28 DIAGNOSIS — E11.9 TYPE 2 DIABETES MELLITUS WITHOUT COMPLICATION, WITHOUT LONG-TERM CURRENT USE OF INSULIN (H): ICD-10-CM

## 2025-05-28 DIAGNOSIS — M45.9 ANKYLOSING SPONDYLITIS, UNSPECIFIED SITE OF SPINE (H): ICD-10-CM

## 2025-05-28 DIAGNOSIS — E53.8 B12 DEFICIENCY: ICD-10-CM

## 2025-05-28 LAB
ALBUMIN SERPL BCG-MCNC: 4.5 G/DL (ref 3.5–5.2)
ALP SERPL-CCNC: 67 U/L (ref 40–150)
ALT SERPL W P-5'-P-CCNC: 21 U/L (ref 0–70)
ANION GAP SERPL CALCULATED.3IONS-SCNC: 10 MMOL/L (ref 7–15)
AST SERPL W P-5'-P-CCNC: 27 U/L (ref 0–45)
BASOPHILS # BLD AUTO: 0.1 10E3/UL (ref 0–0.2)
BASOPHILS NFR BLD AUTO: 1 %
BILIRUB SERPL-MCNC: 0.2 MG/DL
BUN SERPL-MCNC: 12.4 MG/DL (ref 6–20)
CALCIUM SERPL-MCNC: 9.7 MG/DL (ref 8.8–10.4)
CHLORIDE SERPL-SCNC: 104 MMOL/L (ref 98–107)
CREAT SERPL-MCNC: 1.15 MG/DL (ref 0.67–1.17)
CREAT UR-MCNC: 81.1 MG/DL
CRP SERPL-MCNC: <3 MG/L
EGFRCR SERPLBLD CKD-EPI 2021: 77 ML/MIN/1.73M2
EOSINOPHIL # BLD AUTO: 0.2 10E3/UL (ref 0–0.7)
EOSINOPHIL NFR BLD AUTO: 3 %
ERYTHROCYTE [DISTWIDTH] IN BLOOD BY AUTOMATED COUNT: 12.6 % (ref 10–15)
ERYTHROCYTE [SEDIMENTATION RATE] IN BLOOD BY WESTERGREN METHOD: 6 MM/HR (ref 0–20)
GLUCOSE SERPL-MCNC: 96 MG/DL (ref 70–99)
HCO3 SERPL-SCNC: 27 MMOL/L (ref 22–29)
HCT VFR BLD AUTO: 43.2 % (ref 40–53)
HGB BLD-MCNC: 14.9 G/DL (ref 13.3–17.7)
IMM GRANULOCYTES # BLD: 0 10E3/UL
IMM GRANULOCYTES NFR BLD: 0 %
LYMPHOCYTES # BLD AUTO: 3 10E3/UL (ref 0.8–5.3)
LYMPHOCYTES NFR BLD AUTO: 43 %
MCH RBC QN AUTO: 32.5 PG (ref 26.5–33)
MCHC RBC AUTO-ENTMCNC: 34.5 G/DL (ref 31.5–36.5)
MCV RBC AUTO: 94 FL (ref 78–100)
MICROALBUMIN UR-MCNC: 17.6 MG/L
MICROALBUMIN/CREAT UR: 21.7 MG/G CR (ref 0–17)
MONOCYTES # BLD AUTO: 0.8 10E3/UL (ref 0–1.3)
MONOCYTES NFR BLD AUTO: 11 %
NEUTROPHILS # BLD AUTO: 2.9 10E3/UL (ref 1.6–8.3)
NEUTROPHILS NFR BLD AUTO: 42 %
PLATELET # BLD AUTO: 221 10E3/UL (ref 150–450)
POTASSIUM SERPL-SCNC: 4.4 MMOL/L (ref 3.4–5.3)
PROT SERPL-MCNC: 7.1 G/DL (ref 6.4–8.3)
RBC # BLD AUTO: 4.59 10E6/UL (ref 4.4–5.9)
SODIUM SERPL-SCNC: 141 MMOL/L (ref 135–145)
VIT B12 SERPL-MCNC: 734 PG/ML (ref 232–1245)
VIT D+METAB SERPL-MCNC: 55 NG/ML (ref 20–50)
WBC # BLD AUTO: 6.9 10E3/UL (ref 4–11)

## 2025-05-28 PROCEDURE — 82043 UR ALBUMIN QUANTITATIVE: CPT

## 2025-05-28 PROCEDURE — 86140 C-REACTIVE PROTEIN: CPT

## 2025-05-28 PROCEDURE — 82306 VITAMIN D 25 HYDROXY: CPT | Mod: GZ

## 2025-05-28 PROCEDURE — 82570 ASSAY OF URINE CREATININE: CPT

## 2025-05-28 PROCEDURE — 36415 COLL VENOUS BLD VENIPUNCTURE: CPT

## 2025-05-28 PROCEDURE — 85652 RBC SED RATE AUTOMATED: CPT

## 2025-05-28 PROCEDURE — 82607 VITAMIN B-12: CPT

## 2025-05-28 PROCEDURE — 80053 COMPREHEN METABOLIC PANEL: CPT

## 2025-05-28 PROCEDURE — 85025 COMPLETE CBC W/AUTO DIFF WBC: CPT

## 2025-05-28 NOTE — NURSING NOTE
"Chief Complaint   Patient presents with     Derm Problem     boil - R buttocks (belt line)       Initial /76 (BP Location: Right arm, Patient Position: Right side, Cuff Size: Adult Regular)  Pulse 72  Temp 98.2  F (36.8  C) (Oral)  Resp 16  Ht 1.956 m (6' 5\")  SpO2 96% Estimated body mass index is 35.21 kg/(m^2) as calculated from the following:    Height as of 1/2/17: 1.956 m (6' 5\").    Weight as of 2/2/17: 134.7 kg (296 lb 14.4 oz).  Medication Reconciliation: complete     Will Benoit WINKLER      " SUN PROTECTION    Sun protection is an important part in decreasing our risk of skin cancers, protecting against age spots, and minimizing wrinkles. We can actively participate in sun protection by avoiding excessive sun exposure and applying sunscreens and wearing sun protective clothing.    I recommend daily use of sunscreens to face and tops of hands. With outdoor activities, sunscreens are recommended to exposed body areas. When applying sunscreen, be sure to use a full ounce (handful amount) to entire body 15-20 minutes prior to sun exposure. Reapply every 2-3 hours depending on your activity. Excessive sweating or activities which involve water require more frequent applications.    I recommend sunscreens which have both UVA and UVB protection. Sun Protection factor (SPF) is a measurement of the UVB blockade. A minimum SPF of 30 is advised. When purchasing a sunscreen, look for a product which contains one of the following active ingredients: Titanium Dioxide, or Zinc Oxide. These ingredients provide the best UV protection.    Another most excellent option is sun protective clothing! Hats and other forms of clothing labeled with the UPF label are options to protect your skin. These can be found through online retailers, as well as some local clothing stores. A good brand to start looking at is Coolibar.com for examples.    Suggested sunscreens for body:  Banana Boat sunscreen for kids (titanium dioxide and zinc oxide) - great choice for families and comes in a larger bottle and very affordable!  CeraVe Facial Sunscreen SPF 30 - good choice for sensitive skin  Cetaphil Facial Sunscreen SPF 30 - good choice for sensitive skin  Aveeno Continuous Protection with Active Photobarrier Complex Stabilizer  Coppertone Shade Lotion  Eucerin Everyday Protection SPF 30  Vanicream Sunscreen    La Roche Posay Sunscreen (there are a few options all of which work well)  EltaMD Sunscreen  Neutrogena Sunblock with Helioplex  Stabilizer (chemical sunscreen)    Suggested facial sunscreens  A[place] UV Mineral Multi-Defense Tinted Sunscreen Fluid SPF 50+ (darker tint, good for darker skin tones)  A[address] UV Mineral Multi-Defense Sunscreen Fluid SPF 50+  Blue Lizard Face Sunscreen SPF 30+ Broad Spectrum UVA/UVB Protection  CeraVe Hydrating Mineral Sunscreen SPF 50 Face Lotion - EASY TO FIND  CeraVe Hydrating Mineral Sunscreen SPF 30 Face Sheer Tint - EASY TO FIND  Elta MD UV Clear Broad-Spectrum SPF 46  EltaMD UV Clear Broad-Spectrum SPF 46 - Tinted  EltaMD UV Elements (purely physical sunscreen) Tinted Broad-Spectrum SPF 44   Eryfotona Ageless Ultralight Tinted Mineral Sunscreen SPF 50+  Eryfotona Actinica Daily Mineral Sunscreen SPF 50+  Epionce Daily Shield Tinted SPF 50 Sunscreen (my personal favorite, very highly water resistant)  La Roche-Posay Anthelios 50 Tinted Mineral Ultra Fluid Sunscreen  La Roche-Posay Anthelios Melt-In Milk Body & Face Sunscreen   SkinMedica Essential Defense Mineral Shield Broad-Spectrum SPF 32 - Tinted     What to look for on skin that may be worrisome for skin cancer?  Basal cell carcinoma (BCC) usually develops on areas of skin exposed to the sun, especially the head, face and neck, chest, and back. BCC may appear on the skin as:  a sore that doesn't heal or comes back after healing, or bleeds easily  small, smooth and shiny lumps that are pearly white, pink or red  a pink growth with raised edges and indents in the center  a growth that has small blood vessels on the surface    Squamous cell carcinoma  (SCC) usually develops on areas of skin exposed to the sun. SCC may appear on the skin as:  a sore that doesn't heal or comes back after healing  rough or scaly red patches with irregular borders  a sore that is crusty or bleeds easily  a growth or area that is itchy, irritated or sore    What to look for in your moles that are worrisome for melanoma?  Melanoma often looks like a brown or  black mole or birthmark. But melanoma has features that make it different from normal moles and birthmarks. People can remember the abnormal features of melanoma by thinking of the letters A, B, C, D, and E (picture 1):  Asymmetry - One half can look different than the other half.  Border - It can have a jagged or uneven edge.  Color - It can have different colors.  Diameter - It is larger than the eraser on the end of a pencil.  Evolution - Its size, color, or shape can change over time.    Skin affected by melanoma can also bleed or become swollen, red, or crusty.    Many moles and birthmarks are normal and are not melanoma. But if you have a mole or birthmark that you think might be abnormal, show it to your doctor or nurse.    Can melanoma be prevented? -- You can help prevent melanoma by protecting your skin from the sun's rays. Sun exposure and sunburn are a big cause of melanoma. To reduce the chance of getting melanoma, you can:  Stay out of the sun in the middle of the day (from 10 AM to 4 PM)  Wear sunscreen and reapply it often  Wear a wide-brimmed hat, long-sleeved shirt, or long pants  Not use tanning beds. They increase your risk of getting melanoma

## 2025-05-29 ENCOUNTER — RESULTS FOLLOW-UP (OUTPATIENT)
Dept: PHARMACY | Facility: CLINIC | Age: 50
End: 2025-05-29

## 2025-05-29 NOTE — TELEPHONE ENCOUNTER
Patient is calling, he would like to know if he could get a steroid injection for his TMJ.      Lorraine CHAVARRIA    [facility]     Subjective   Patient ID: Zoe Wallace is a 44 y.o. female who presents for Surgical clearance.    Medical Clearance  -Type of Surgery: Right knee arthroscopic with lateral lengthening.    -Diagnosis: Tilt patella,   -Date of Surgery: 6/11/25  -Surgeon: Dr. Queen  -Surgical Facility:  [facility]  -Issues with Anesthesia: None           Review of Systems    Objective   /90   Pulse 105   Temp 37.4 °C (99.3 °F) (Temporal)   Resp 18   Wt 82.6 kg (182 lb 3.2 oz)   SpO2 99%   BMI 29.41 kg/m²     Physical Exam  Vitals reviewed.   Constitutional:       General: She is not in acute distress.  Cardiovascular:      Rate and Rhythm: Normal rate and regular rhythm.   Pulmonary:      Effort: Pulmonary effort is normal.      Breath sounds: No wheezing or rhonchi.   Musculoskeletal:      Right lower leg: No edema.      Left lower leg: No edema.   Lymphadenopathy:      Cervical: No cervical adenopathy.   Neurological:      Mental Status: She is alert.         Assessment/Plan   Diagnoses and all orders for this visit:  Chronic pain of right knee  Preoperative examination  Primary hypertension  Mannose-binding lectin deficiency (Multi)  NIKOLAI (obstructive sleep apnea)    Patient Instructions   Here for preoperative evaluation for Right Knee arthroscopic surgery. Blood work is normal.  EKG is normal sinus rhythm.   Cleared by Dr. Blancas - cardiology.  Pt is cleared for surgery.      For wegovy - stop now and start 1 week after surgery.  Avoid ibuprofen/aleve 7 days prior.  Tylenol is safe to take.

## 2025-06-03 ENCOUNTER — VIRTUAL VISIT (OUTPATIENT)
Dept: PHARMACY | Facility: CLINIC | Age: 50
End: 2025-06-03
Attending: INTERNAL MEDICINE
Payer: MEDICARE

## 2025-06-03 DIAGNOSIS — E11.9 TYPE 2 DIABETES MELLITUS WITHOUT COMPLICATION, WITHOUT LONG-TERM CURRENT USE OF INSULIN (H): Primary | ICD-10-CM

## 2025-06-03 DIAGNOSIS — I10 HYPERTENSION GOAL BP (BLOOD PRESSURE) < 140/90: ICD-10-CM

## 2025-06-03 RX ORDER — TIRZEPATIDE 5 MG/.5ML
5 INJECTION, SOLUTION SUBCUTANEOUS
Qty: 2 ML | Refills: 1 | Status: SHIPPED | OUTPATIENT
Start: 2025-06-03

## 2025-06-03 ASSESSMENT — PATIENT HEALTH QUESTIONNAIRE - PHQ9
SUM OF ALL RESPONSES TO PHQ QUESTIONS 1-9: 9
10. IF YOU CHECKED OFF ANY PROBLEMS, HOW DIFFICULT HAVE THESE PROBLEMS MADE IT FOR YOU TO DO YOUR WORK, TAKE CARE OF THINGS AT HOME, OR GET ALONG WITH OTHER PEOPLE: SOMEWHAT DIFFICULT
SUM OF ALL RESPONSES TO PHQ QUESTIONS 1-9: 9

## 2025-06-03 NOTE — PROGRESS NOTES
Medication Therapy Management (MTM) Encounter    ASSESSMENT:                            Medication Adherence/Access: No issues identified.    Diabetes: Patient has been able to tolerate current Mounjaro dose for the most part however has been having some dizziness symptoms that could be caused from multiple factors including Mounjaro thus recommend patient continue on current dose to help with overall tolerance and may consider additional dose increase when patient's symptoms of dizziness resolve. Will obtain updated A1c prior to upcoming visit with Dr. Cuenca to assess efficacy. Educated that constipation could also be caused by multiple factors including Mounjaro, oxycodone, and cyclobenzaprine thus recommend patient continue to monitor and utilize laxatives as needed.      Hypertension: Patient may be experiencing some dizziness due to lower blood pressures from recent weight loss with Mounjaro, thus will discuss plan for adjusting blood pressure medications with Dr. Lyles and MTM pharmacist Daja. Dizziness could also be related to cyclobenzaprine start.     PLAN:                            Continue Mounjaro 5mg weekly     I will consult with Dr. Lyles and Daja about blood pressures medications and dizziness, will send follow up via my chart     Follow-up: 7/2/25 at 2PM    SUBJECTIVE/OBJECTIVE:                          Tito Pnieda is a 49 year old male called for a follow-up visit.       Reason for visit: Mounjaro follow up.    Allergies/ADRs: Reviewed in chart  Past Medical History: Reviewed in chart  Tobacco: He reports that he has never smoked. He has never been exposed to tobacco smoke. He has never used smokeless tobacco.  Alcohol: Reviewed in chart     Medication Adherence/Access: no issues reported.    Diabetes   Patient diagnosed with type 2 diabetes   Current Medications:  Mounjaro 5mg weekly on Thursdays. Patient has completed 7 doses thus far. Patient reports that they have had some symptoms of nausea  with continued use, notes that symptoms are overall tolerable and only occur off and on. Notes that they continue to use oxycodone as needed for which they are usually taking daily but notes that they are having more constipation symptoms, not sure if related to oxycodone or Mounjaro. Has Miralax and bisacodyl on hand and uses as needed. Also has been having lower blood pressures/dizziness, section below for additional information.   Weight: patient notes that since starting Mounjaro, they have lost 5 lbs.   Medication History:  - Ozempic: rash occurring on face with higher doses, replaced by Mounjaro     Hypertension   Current Medications:   Ramipril 10mg daily  Metoprolol XL 200mg in the morning and 100mg in the evening - patient notes that they did try to reduce dose of metoprolol to 100mg twice daily and this caused blood pressures to increase too much.  Patient notes that they have been having dizziness and lower blood pressures with position changes which have been new changes over the past month. Reports a recent blood pressure reading of 87/64 with pulse of 100, notes that he doesn't think it is related to POTS. Additionally has started cyclobenzaprine and patient notes that cyclobenzaprine has been effective for pain control but not sure if dizziness has worsened since starting. Has been taking cyclobenzaprine twice daily, changed from methocarbamol which patient notes that cyclbenzaprine has been more effective to control pain and has been allowing him to use less oxycodone as a result.    Patient reported blood pressures: 95/72 P100 initially taken during visit today then was 112/75 P76 after sitting for 10 minutes      Today's Vitals: There were no vitals taken for this visit.  ----------------    I spent 25 minutes with this patient today. All changes were made via collaborative practice agreement with Abbie Cuenca.     A summary of these recommendations was sent via [employer].    Traci Méndez),  PharmD  Endocrine & Diabetes Medication Therapy Management Pharmacist   [address] [place] [id number]     Contact information:   To schedule a MTM appointment: [phone]  For questions or concerns, please send a [employer] message or call the clinic at [phone].    For more urgent concerns that do not require 911, please call [phone] after hours/weekends and ask to speak with the Endocrinologist on call.      Telemedicine Visit Details  The patient's medications can be safely assessed via a telemedicine encounter.  Type of service:  Telephone visit  Originating Location (pt. Location): Home    Distant Location (provider location):  Off-site  Start Time: 1:35 PM  End Time: 2:00 PM     Medication Therapy Recommendations  No medication therapy recommendations to display

## 2025-06-03 NOTE — Clinical Note
Hi Dr. Lyles and Daja,   Patient has been having some lower blood pressures and dizziness for the past month, Mounjaro dose has continued to be 5mg weekly with no recent dose change but has lost 5lbs since starting Mounjaro. Currently taking Ramipril 10mg daily and Metoprolol XL 200mg in the morning and 100mg in the evening - patient did try to reduce dose of metoprolol to 100mg twice daily and this caused blood pressures to increase too much.  However they have been having lower blood pressures ( 95/72 P100 initially taken during visit today then was 112/75 P76 after sitting for 10 minutes) and dizziness.   Wondering thoughts on if blood pressure medications should be adjusted.   Additionally patient has started cyclobenzaprine which could be contributing to dizziness, more constipation, and dry mouth however patient finds the cyclobenzaprine to be more effective compared to methocarbamol.   Thanks,  Traci CortezWashington County Memorial Hospitalvon), PharmD Endocrine & Diabetes Medication Therapy Management Pharmacist

## 2025-06-04 ENCOUNTER — VIRTUAL VISIT (OUTPATIENT)
Facility: CLINIC | Age: 50
End: 2025-06-04
Payer: MEDICARE

## 2025-06-04 ENCOUNTER — RESULTS FOLLOW-UP (OUTPATIENT)
Dept: RHEUMATOLOGY | Facility: CLINIC | Age: 50
End: 2025-06-04

## 2025-06-04 DIAGNOSIS — F41.1 GENERALIZED ANXIETY DISORDER: ICD-10-CM

## 2025-06-04 DIAGNOSIS — F33.1 MAJOR DEPRESSIVE DISORDER, RECURRENT EPISODE, MODERATE (H): Primary | ICD-10-CM

## 2025-06-04 PROCEDURE — 90834 PSYTX W PT 45 MINUTES: CPT | Mod: 95 | Performed by: PSYCHOLOGIST

## 2025-06-04 NOTE — PROGRESS NOTES
[facility]                                     Progress Note    Patient Name: Joel Pineda  Date: June 4, 2025          Service Type: Individual      Session Start Time: 2:00 PM Session End Time: 2:47 PM       Session Length:  47 minutes    Session #: 26    Attendees: Client attended alone    Service Modality:  Video Visit:      Provider verified identity through the following two step process.  Patient provided:  Patient photo and Patient is known previously to provider    Telemedicine Visit: The patient's condition can be safely assessed and treated via synchronous audio and visual telemedicine encounter.      Reason for Telemedicine Visit: Patient convenience (e.g. access to timely appointments / distance to available provider)    Originating Site (Patient Location): Patient's home    Distant Site (Provider Location): Provider Remote Setting- Home Office    Consent:  The patient/guardian has verbally consented to: the potential risks and benefits of telemedicine (video visit) versus in person care; bill my insurance or make self-payment for services provided; and responsibility for payment of non-covered services.     Patient would like the video invitation sent by:  Text to cell phone: [phone]    Mode of Communication:  Video Conference via [facility]    Distant Location (Provider):  Off-site    As the provider I attest to compliance with applicable laws and regulations related to telemedicine.    DATA  Interactive Complexity: No  Crisis: No        Progress Since Last Session (Related to Symptoms / Goals / Homework):   Symptoms: No change Tito continues to report low mood, ongoing anxiety and panic.    Homework: Achieved / completed to satisfaction       Episode of Care Goals: Satisfactory progress - CONTEMPLATION (Considering change and yet undecided); Intervened by assessing the negative and positive thinking (ambivalence) about behavior change     Current / Ongoing Stressors and  Concerns:   Tito received a call from [employer], states he has never had any request to meet with them in 20 years. Update from [facility] meeting, monitoring some 'off' BP readings. He continues to work on NONA items, and he reports his colleague is being 'tight lipped' about the purpose of the board meeting she has called. Patient and provider discussed strategies to manage distress related to 'unknowns' especially as it is challenging to 'cope ahead' when he does not have access to the agenda. Reflected he seems more factual today, possibly compartmentalizing his emotions. Sees ENT tomorrow, which he feels is good timing since he has completed [facility] therapy. He reports he has not been as active in attending to his ADLS the past few days, he is unsure what is getting in the way. Discussed SMART goals to increase engagement with ADLS. He stopped Flexeril at night in hopes this would assist with fatigue, but has not noted benefit yet. Discussed creating SMART goal around showering. Discussed reducing visits to every other week in July due to provider's schedule.      Treatment Objective(s) Addressed in This Session:   identify three distraction and diversion activities and use those activities to decrease level of anxiety   and use cognitive strategies identified in therapy to challenge anxious thoughts, Increase interest, engagement, and pleasure in doing things  Decrease frequency and intensity of feeling down, depressed, hopeless  Identify negative self-talk and behaviors: challenge core beliefs, myths, and actions, identify 5 traits or charcteristics you like about yourself, practice one mindfulness skill each day for 5 minutes and reduce their emotional vulnerability by 40% during the Emotion Regulation Module (6-8 weeks)      Intervention:      CBT: cognitive challenging, restructuring, reframing Behavioral activation techniques, grounding strategies  PCT: supportive autonomy, unconditional positive  regard, empathic reflection, active listening  DBT: self-soothing with the 5 senses, TIP Skill, IMPROVE the moment, assertive communication/DEAR MAN    Assessments completed prior to visit:  The following assessments were completed by patient for this visit:  The following assessments were completed by patient for this visit:  PHQ9:       2/18/2025    10:29 AM 3/4/2025     2:20 PM 3/12/2025    11:35 AM 4/15/2025     2:32 PM 4/22/2025     2:28 PM 4/29/2025     3:35 PM 6/3/2025    11:18 AM   PHQ-9 SCORE   PHQ-9 Total Score MyChart 10 (Moderate depression) 6 (Mild depression) 8 (Mild depression) 7 (Mild depression) 10 (Moderate depression) 9 (Mild depression) 9 (Mild depression)   PHQ-9 Total Score 10  6  8  7  10  9  9        Patient-reported     GAD7:       12/3/2024    12:14 PM 12/17/2024     2:51 PM 1/1/2025     3:50 PM 2/18/2025     2:18 PM 3/12/2025    11:39 AM 4/15/2025     2:35 PM 4/29/2025     3:35 PM   YUDI-7 SCORE   Total Score 9 (mild anxiety) 12 (moderate anxiety) 8 (mild anxiety) 10 (moderate anxiety) 15 (severe anxiety) 12 (moderate anxiety) 13 (moderate anxiety)   Total Score 9  12  8  10  15  12  13        Patient-reported         ASSESSMENT: Current Emotional / Mental Status (status of significant symptoms):   Risk status (Self / Other harm or suicidal ideation)   Patient reports the following current fears or concerns for personal safety: Ongoing passive thoughts of suicide with absence of plan or intent and agreed to follow his previously developed [place] Safety Plan.   Patient denies current or recent suicidal ideation or behaviors.   Patient denies current or recent homicidal ideation or behaviors.   Patient denies current or recent self injurious behavior or ideation.   Patient denies other safety concerns.   Patient reports there has been no change in risk factors since their last session.     Patient reports there has been no change in protective factors since their last session.     A  safety and risk management plan has been developed including: Patient consented to co-developed safety plan on 10/21/2024.  Safety and risk management plan was reviewed.   Patient agreed to use safety plan should any safety concerns arise.  A copy was made available to the patient.     Appearance:   Appropriate    Eye Contact:   Fair    Psychomotor Behavior: Normal    Attitude:   Cooperative    Orientation:   All   Speech    Rate / Production: Normal/ Responsive    Volume:  Normal    Mood:    Anxious  Depressed  Sad    Affect:    Subdued  Worrisome    Thought Content:  Clear  Perseverative   Thought Form:  Coherent    Insight:    Fair      Medication Review:   Changes to psychiatric medications, see updated Medication List in EPIC.       Medication Compliance:   Yes - may not take some PRNs as often as he could     Changes in Health Issues:   None reported     Chemical Use Review:   Substance Use: Chemical use reviewed, no active concerns identified      Tobacco Use: No current tobacco use.      Diagnosis:  296.32 (F33.1) Major Depressive Disorder, Recurrent Episode, Moderate _.  300.02 (F41.1) Generalized Anxiety Disorder.    Collateral Reports Completed:   Not Applicable    PLAN: (Patient Tasks / Therapist Tasks / Other)    Patient to continue to use non-judgmental stance and cope ahead skills.     Patient and provider will continue practicing how to reframe automatic thinking from negative situations. Patient to cultivate an awareness of anticipating triggers and predicting it will cause emotional distress.    Patient will practice radical self-compassion techniques to decrease shame.     Patient to engage in basic self-cares and focus on present moment.     Patient to explore strategies to increase motivation.     Patient to continue to use [place] Ahead skill for situations he anticipates will have negative outcome or evoke strong emotional response.    Patient to create SMART goal for showering 3-4 times per week  and track progress with a non-judgmental stance and problem solving attitude.     Shirley Bartlett, Ha LP                                                      ______________________________________________________________________    Individual Treatment Plan    Patient's Name: Joel Pineda  YOB: 1975    Date of Creation: October 30, 2024   Date Treatment Plan Last Reviewed/Revised: October 30, 2024; January 29, 2025; April 30, 2025     DSM5 Diagnoses: 296.32 (F33.1) Major Depressive Disorder, Recurrent Episode, Moderate _ or 300.02 (F41.1) Generalized Anxiety Disorder  Psychosocial / Contextual Factors: Medical complexities, Trauma history, Interpersonal concerns.    recent surgery and resulting acute pain, chronic pain, chronic mental health concerns, mobility limitations  PROMIS (reviewed every 90 days):   PROMIS-10 Scores        2/18/2025     2:19 PM 5/20/2025     5:16 PM 5/31/2025    10:49 AM   PROMIS-10 Total Score w/o Sub Scores   PROMIS TOTAL - SUBSCORES 18  19  20        Patient-reported        Referral / Collaboration:  Referral to another professional/service is not indicated at this time..    Anticipated number of session for this episode of care: 9-12 sessions  Anticipation frequency of session: Weekly  Anticipated Duration of each session: 53 or more minutes  Treatment plan will be reviewed in 90 days or when goals have been changed.     MeasurableTreatment Goal(s) related to diagnosis / functional impairment(s)  Goal 1: Patient will decrease anxiety by 75% as evidenced by YUDI-7    I will know I've met my goal when I am at peace with where I'm at.       Objective #A (Patient Action)                          Patient will identify the situations / thoughts that contribute to feeling anxious.  Status: Continued - Date(s):1/29/2025, 4/30/2025       Intervention(s)  Therapist will process anxiety-proving emotions.     Objective #B  Patient will use at least 3 coping skills for anxiety  management in the next 4 weeks.  Status: Continued - Date(s):1/29/2025, 4/30/2025        Intervention(s)  Therapist will assign homework : coping skills practice to decrease anxiety .     Objective #C  Patient will use cognitive strategies identified in therapy to challenge anxious thoughts.  Status: Continued - Date(s):1/29/2025, 4/30/2025        Intervention(s)  Therapist will  teach the patient ways to reframe negative core beliefs that contribute to anxiety.     Goal 2 Patient will report pain levels are consistently rated at 2/10 per patient report.   I will know I've met my goal when my pain is less disruptive.      Objective #A (Patient Action)    Patient will identify 5 strategies for managing pain.  Status: Continued - Date(s):1/29/2025, 4/30/2025     Intervention(s)  Therapist will teach strategies to manage pain and assign homework to use 3 strategies daily.    Objective #B  Patient will engage in physical activity and PT exercises 2-3 times daily.  Status: Continued - Date(s):1/29/2025, 4/30/2025      Intervention(s)  Therapist will assign homework to engage in at minimum one PT or physical activity daily.    Objective #C  Patient will Identify negative self-talk and behaviors: challenge core beliefs, myths, and actions.  Status: Continued - Date(s):1/29/2025, 4/30/2025      Intervention(s)  Therapist will teach non-judgmental stance and help patient reframe negative self-talk .      Goal 3: Patient will report reduction in depressive symptoms as evidenced by PHQ-9 score reduction of 75% or greater.     I will know I've met my goal when I feel like I have purpose in life.      Objective #A (Patient Action)    Status: Continued - Date(s):1/29/2025, 4/30/2025      Patient will Decrease frequency and intensity of feeling down, depressed, hopeless.    Intervention(s)  Therapist will teach emotional recognition/identification. Therapist will reinforce use of emotion regulation skills.    Objective #B  Patient  will use healthy communication when describing his emotions or when setting boundaries with others.     Status: Continued - Date(s):1/29/2025, 4/30/2025      Intervention(s)  Therapist will teach emotional regulation skills and interpersonal effectiveness skills to improve quality of communication. Therapist will teach assertive communication skills.    Objective #C  Patient will track and record at least 3 pleasant exchanges with family members such as mother, father, sister, brother.  Status: Continued - Date(s):1/29/2025, 4/30/2025     Intervention(s)  Therapist will teach about healthy boundaries. Therapist will encourage patient to focus on positive interactions with family and use cope ahead to increase likelihood of pleasant experience as an outcome.    Patient has reviewed and agreed to the above plan.      Shirley Bartlett PsyD LP  April 30, 2025   Answers submitted by the patient for this visit:  Patient Health Questionnaire (Submitted on 12/3/2024)  If you checked off any problems, how difficult have these problems made it for you to do your work, take care of things at home, or get along with other people?: Very difficult  PHQ9 TOTAL SCORE: 10  Patient Health Questionnaire (G7) (Submitted on 12/3/2024)  YUDI 7 TOTAL SCORE: 9    Answers submitted by the patient for this visit:  Patient Health Questionnaire (Submitted on 12/17/2024)  If you checked off any problems, how difficult have these problems made it for you to do your work, take care of things at home, or get along with other people?: Very difficult  PHQ9 TOTAL SCORE: 15  Patient Health Questionnaire (G7) (Submitted on 12/17/2024)  YUDI 7 TOTAL SCORE: 12    Answers submitted by the patient for this visit:  Patient Health Questionnaire (Submitted on 1/14/2025)  If you checked off any problems, how difficult have these problems made it for you to do your work, take care of things at home, or get along with other people?: Somewhat difficult  PHQ9 TOTAL  SCORE: 10    [place] Safety Plan      Creation Date: 10/21/24       Step 1: Warning signs:    Warning Signs    Start thinking about death more      Step 2: Internal coping strategies - Things I can do to take my mind off my problems without contacting another person:    Strategies    Petting cats    Watching music videos    talk to family member    text friend Tono    hot bubble bath      Step 3: People and social settings that provide distraction:    Name Contact Information    Tono - friend [phone]    Mother [phone]         Step 4: People whom I can ask for help during a crisis:    Name Contact Information    Keyana - sister [phone]      Step 5: Professionals or agencies I can contact during a crisis:    Clinician/Agency Name Phone Emergency Contact    Shirley Bartlett [phone]    [address]        [facility] Emergency Department Emergency Department Address Emergency Department Phone    [facility]       Suicide Prevention Lifeline Phone: Call or Text 171  Crisis Text Line: Text HOME to 126540     Step 6: Making the environment safer (plan for lethal means safety):   Previous medications - dispose of unused or older medications     Optional: What is most important to me and worth living for?:   Niece  Mother  Sister  Jag     [place] Safety Plan. Shanita Lazcano and Ariel Lorenzo. Used with permission of the authors.          Answers submitted by the patient for this visit:  Patient Health Questionnaire (Submitted on 4/29/2025)  If you checked off any problems, how difficult have these problems made it for you to do your work, take care of things at home, or get along with other people?: Somewhat difficult  PHQ9 TOTAL SCORE: 9  Patient Health Questionnaire (G7) (Submitted on 4/29/2025)  YUDI 7 TOTAL SCORE: 13    Answers submitted by the patient for this visit:  Patient Health Questionnaire (Submitted on 6/3/2025)  If you checked off any problems, how difficult have these  problems made it for you to do your work, take care of things at home, or get along with other people?: Somewhat difficult  PHQ9 TOTAL SCORE: 9

## 2025-06-05 ENCOUNTER — THERAPY VISIT (OUTPATIENT)
Dept: SPEECH THERAPY | Facility: CLINIC | Age: 50
End: 2025-06-05
Payer: MEDICARE

## 2025-06-05 ENCOUNTER — OFFICE VISIT (OUTPATIENT)
Dept: OTOLARYNGOLOGY | Facility: CLINIC | Age: 50
End: 2025-06-05
Payer: MEDICARE

## 2025-06-05 DIAGNOSIS — R13.14 PHARYNGOESOPHAGEAL DYSPHAGIA: ICD-10-CM

## 2025-06-05 DIAGNOSIS — J38.7 PRESBYLARYNGES: ICD-10-CM

## 2025-06-05 DIAGNOSIS — R49.0 DYSPHONIA: Primary | ICD-10-CM

## 2025-06-05 PROCEDURE — 92524 BEHAVRAL QUALIT ANALYS VOICE: CPT | Mod: GN | Performed by: SPEECH-LANGUAGE PATHOLOGIST

## 2025-06-05 NOTE — PATIENT INSTRUCTIONS
"Recommendations from today's MTM visit:                                                      Continue Mounjaro 5mg weekly     I will consult with Dr. Lyles and Daja about blood pressures medications and dizziness, will send follow up via my chart     Follow-up: 7/2/25 at 2PM    It was great speaking with you today.  I value your experience and would be very thankful for your time in providing feedback in our clinic survey. In the next few days, you may receive an email or text message from [employer] with a link to a survey related to your  clinical pharmacist.\"     To schedule another MTM appointment, please call the clinic directly or you may call the MTM scheduling line at [phone].    My Clinical Pharmacist's contact information:                                                      Please feel free to contact me with any questions or concerns you have.      Traci Méndez), PharmD  Endocrine & Diabetes Medication Therapy Management Pharmacist   [address] [place] [id number]     Contact information:   To schedule a MTM appointment: [phone]  For questions or concerns, please send a [employer] message or call the clinic at [phone].    For more urgent concerns that do not require 739, please call [phone] after hours/weekends and ask to speak with the Endocrinologist on call.     "

## 2025-06-05 NOTE — PROGRESS NOTES
Rehab Screen within Multidisciplinary Physician Clinic    Joel Pineda was seen by Speech Language Pathology  in as part of ENT multidisciplinary clinic visit.  Pt being see in multi-disciplinary clinic for follow-up after voice therapy. Formal evaluation not completed this date as patient denies any changes in swallow function..    Services patient is receiving: He completed voice therapy at outside institution.  Visit focused on: He reports swallowing remains quite functional and denies any troubles.  Current functional status based on informal observation/patient report: Swallowing: Previously patient had small amount of postcricoid residue.  He does not notice this.  Education provided: Safe swallow precautions and symptoms to report to Dr. Patino in regards to swallowing.  Recommendations: referral to GI if there are increased swallow challenges.    All questions answered within scope of practice. Patient/family encouraged pt to reach out with any questions or concerns.      Heather Childers MS, CCC-SLP  Speech-Language Pathology  [facility]  Department of Otolaryngology/D&T - 4th floor  Phone: [phone]  Email: [email or web address]

## 2025-06-09 NOTE — PROGRESS NOTES
[facility]  Clinical Voice and Upper Airway Evaluation Report    Patient's Name: Joel Pineda  Date of Evaluation: 6/5/2025  Providing SLP: Luz Adams MS CCC-SLP  Seen in Conjunction With: Josefina Patino MD - Heather Childers, CCC-SLP  Insurance Coverage: Medicare (Certification Dates: 4/24/25 - 7/23/25)  Chief Complaint: Dysphonia  Evaluation Location: [facility]  Time of Evaluation: 11:00 - 11:45 AM      Impressions from initial evaluation on 1/24/25 with Domi Rehman, [place]AL[place] CCC-SLP:    Joel Pineda is a 49 year old , presenting today with Laryngeal Hyperfunction (J38.7) and Dysphonia (R49.0) in the, as evidenced by evaluation the results of the evaluation,  laryngeal function studies, as well as the laryngeal exam.    Remarkable findings included:  Perceptual evaluation demonstrated: Mild intermittent roughness  Laryngeal exam demonstrated:   Mild bilateral bowing  Primary complaint of patient today included: Dysphonia and an imbalance in respiratory mechanics     Therefore, we recommended a course of speech therapy to address these concerns.     STIMULABILITY: results of therapy probes during perceptual and laryngeal evaluation demonstrate improvement with use of forward resonant stimuli, coordination of respiration and phonation, and use of yawn sigh     RECOMMENDATIONS:   A course of speech therapy is recommended to optimize vocal technique, improve voice quality, and promote reduced discomfort, effort and fatigue.  He demonstrates a Good prognosis for improvement given adherence to therapeutic recommendations.   Positive indicators: diagnosis is known to respond to treatment high level of comittment      Patient History:     Underwent SLP care with Chelsey Lee this winter/spring  Was intentionally using his loud voice today  Voice has improved but issues have not resolved  Improved communication  Maximum phonation times have decreased  Discomfort with  "increased use  Denies changes with swallowing      Quality of Life Questionnaires:        1/19/2025    10:33 AM 1/28/2025     8:43 AM   Voice Handicap Index (VHI-10)   My voice makes it difficult for people to hear me 3 2   People have difficulty understanding me in a noisy room 3 2   My voice difficulties restrict my personal and social life.  2 1   I feel left out of conversations because of my voice 2 1   My voice problem causes me to lose income 0 0   I feel as though I have to strain to produce voice 2 2   The clarity of my voice is unpredictable 3 3   My voice problem upsets me 3 2   My voice makes me feel handicapped 1 0   People ask, \"What's wrong with your voice?\" 1 1   VHI-10 20  14        Patient-reported         1/19/2025    10:33 AM 1/28/2025     8:43 AM   Cough Severity Index (CSI)   My cough is worse when I lie down 0 0   My coughing problem causes me to restrict my personal and social life 1 0   I tend to avoid places because of my cough problem 1 0   I feel embarrassed because of my coughing problem 2 1   People ask, ''What's wrong?'' because I cough a lot 0 0   I run out of air when I cough 2 0   My coughing problem affects my voice 2 1   My coughing problem limits my physical activity 1 0   My coughing problem upsets me 2 0   People ask me if I am sick because I cough a lot 1 0   CSI Score 12  2        Patient-reported         1/28/2025     8:43 AM   Eating Assessment Tool (EAT-10)   My swallowing problem has caused me to lose weight 0   My swallowing problem interferes with my ability to go out for meals 0   Swallowing liquids takes extra effort 0   Swallowing solids takes extra effort 2   Swallowing pills takes extra effort 2   Swallowing is painful 1   The pleasure of eating is affected by my swallowing 0   When I swallow food sticks in my throat 2   I cough when I eat 0   Swallowing is stressful 0   EAT-10 7        Patient-reported         1/19/2025    10:33 AM   Dyspnea Index   1. I have " "trouble getting air in. 2   2. I feel tightness in my throat when I am having lindsey breathing problem. 2   3. It takes more effort to breathe than it used to. 2   4. Change in weather affect my breathing problem. 0   5. My breathing gets worse with stress. 0   6. I make sound/noise breathing in 0   7. I have to strain to breathe. 1   8. My shortness of breath gets worse with exercise or physical activity 2   9. My breathing problem makes me feel stressed. 2   10. My breathing problem casuses me to restrict my personal and social life. 1   Dyspnea Index Total Score 12        Patient-reported       Perceptual Analysis (63818): Evaluation of Voice / Speech / Non-Communicative Laryngeal Behaviors    The GRBAS is a perceptual rating of voice change. 0 indicates no impairment, 3 indicates a severe impairment. \"C\" and \"I\" may be used to signify if these feature was observed consistently or intermittently respectively. This is a rating based on clinical judgement of disordered voice quality.  G ( 1-2 - I ) General Dysphonia     R ( 1 - I ) Roughness     B ( 1-2 - I ) Breathiness     A ( 1-2 - I ) Asthenia     S ( 1 ) Strain  Additional information: intermittent high pitch, weak voice breaks during connected speech    *Validity of this measure may be slightly reduced when performed via acoustic signal from virtual visit*    Laryngeal palpation:   Thyrohyoid space: ***  Suprahyoid region: ***  Laryngeal lateralization: ***    Additional observations:   Cough/ Throat Clear: not observed today    Breathing patterns: appropriate at rest   Overt tension: \" \"   Habitual pitch: WNL compared to age- and gender-matched peers   Pitch range: \" \"   Resonance: mid-oral focused  Loudness: appropriate       Laryngoscopy with*** stroboscopy:    Provider performing exam: Josefina Patino MD     Informed consent: Informed verbal consent was obtained, which includes potential side-effects, risks, and benefits of the procedure.     Anesthetic: " "Topical anesthesia with 3% lidocaine and 0.25% phenylephrine was applied the nostrils bilaterally. Viscous lidocaine 4% was applied to the tip of the endoscope.    Scope type: A distal chip flexible laryngoscope was passed through the nare with halogen *** light source(s).    The laryngeal and pharyngeal structures were evaluated for gross appearance, mobility, function, and focal lesions / abnormalities of the associated mucosa.  Velar Function: complete closure without bubbling of secretions and no weakness observed ***  General Appearance: WNL ***  Secretions: WNL ***  Subglottis Appearance: visible portion is patent ***  R Arytenoid Abduction / Adduction: symmetrical and timely ab/adduction with appropriate range of motion ***  L Arytenoid Abduction / Adduction: \" \" ***  Mediolateral Compression: WNL ***  Anteroposterior Compression: WNL ***  Vocal Fold Elongation: appropriate ***  Left (L) Vocal Fold Edge and Mucosa: white with smooth and straight appearance ***  Right (R) Vocal Fold Edge and Mucosa: \" \" ***  Narrow Band Imaging: demonstrates similar findings as halogen light ***  Glottic Closure: complete ***  Phase Closure: predominantly open phase ***  Vertical Level of Approximation: true vocal fold appear to adduct at the same height ***  L Amplitude: WNL ***  R Amplitude: WNL***  L Mucosal Wave: WNL ***  R Mucosal Wave: WNL ***  Phase Symmetry: symmetrical ***  Periodicity: periodic ***  Inhalation Phonation: similar findings to exhalation phonation ***  Other Observations: ***  Patient was asked to mimic how it felt and sounded when they were experiencing dyspnea, and this demonstrated *** inappropriate vocal fold adduction of ***% of the glottis with *** hooding of the arytenoid complexes and adduction of the true vocal folds, yielding stridor  Therapeutic Probes: ***  Humming resulted in ***  Flow/high airflow stimuli resulted in ***  Glottal coup resulted in ***  Increased volume resulted in " "***  Rescue breathing techniques utilizing brisk, nasal inhalation and pursed lip inspiration with prolonged, high pressure exhalation revealed *** maximal vocal fold abduction with maintenance of the glottic airway during exhalation  A coughing attack was triggered during laryngoscopy today, and pulsed \"sh\" resulted in reduced length and severity of the attack    FEES: Evaluation was performed by *** and Dr. Patino. 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 Perceptually, ***. Laryngoscopy today demonstrated touch closure of the vocal folds ***. Therefore, additional surgical intervention has been recommended. Various augmentation/medialization procedures were discussed today with various pros and cons, anesthesia options, etc. Tito will contact our clinic once he has decided how to proceed. Radhika-operative voice therapy may be recommended. Tito is in agreement with this plan of care.    RESEARCH: A warm introduction was *** provided regarding participation in laryngology research studies. This patient is *** interested in being contacted.    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 *** is in agreement with this plan of care and plans to check with her insurance about coverage prior to beginning sessions. - Goals: to improve and maintain a healthy voice quality, to understand the problem and fix it as much as possible, report resolution of symptoms, and use of optimal voice quality and comfort to meet personal, social, and professional needs, 90% of the time during a typical week of vocal activities      Goals:    VCD/PVFM  Gradually decrease VCD episodes to increase quality of life and ability to participate in high level cardio activity without limitations  Perform rescue breathing techniques while asymptomatic to improve automatic response when experiencing dyspnea  Perform rescue breathing techniques before and during exercise to prevent dyspnea/severe VCD attack  Perform rescue breathing during exercise or when exposed to a trigger item to resolve a VCD attack or coughing episode  Continue participating in high level cardio activity while performing rescue breathing to resolve dyspnea  Utilize abdominal breathing techniques in targeted activities (e.g. physical exercise, communication, high-level phonation/pitch range navigation exercises, etc.)  Rebalance laryngeal musculature and strengthen inspiratory muscles resulting in decreased laryngeal sensitivity and decreased frequency of VCD episodes.?  Demonstrate appropriate vocal hygiene including, but not limited to, adequate hydration and integrating behavioral and dietary changes for GERD into their daily life.?   Recoordinate respiratory and laryngeal musculature to resume activities of daily living.?   Patient will perform advanced breathing exercises with the respiratory  focusing on inspiratory muscle strengthening with minimal  assistance 90% of the time.  Patient will demonstrate abdominal focused breathing technique in targeted exercises with minimal assistance 90% of the time.?   Patient will demonstrate abdominal focused breathing technique with physical activity with minimal assistance 90% of the time.  Gradually reduce inappropriate and excessive inhaler use  Demonstrate a 50% reduction in Dyspnea Index score  Patient will express satisfaction with their breathing and their ability to participate in social, personal, and work/school functions without limitation    Chronic Cough  Decrease cough in all activities of daily living using compensation strategies  Independently implement a cough suppression strategy when cough trigger is sensed 90% of the time.?   Decrease the number of coughs per day by 50%   Demonstrate increased tolerance of a chemical and/or environmental irritant as evidenced by increased exposure (decreased proximity and/or increased strength) to that irritant by 50%   Demonstrate a 50% reduction in Cough Severity Index score  Patient will express satisfaction with their coughing and their ability to participate in social, personal, and work/school functions without limitation     Voice  Coordinate phonatory and respiratory subsystems, demonstrating adequate independence for activities of daily communication   Decrease extrinsic laryngeal muscle activity during communication events   Improve voice quality in all activities of daily living using, as measured by a minimum of a 50% point reduction on the Voice Handicap Index-10 or Singing VHI  Demonstrate appropriate vocal hygiene including, but not limited to, adequate hydration, avoiding vocal abuse, integrating behavioral and dietary changes for GERD into their daily life?.   Incorporate behaviors to reduce irritation and promote laryngeal healing and prevent recurrence.?   Implement behavioral strategies to reduce laryngopharyngeal reflux. ?   Independently maintain  a forward focus voice placement 90% of the time during conversational speech.  Independently perform the Vocal Function Exercises, rebalancing respiration, phonation, and resonance 90% of the time  Perform High Level Phonation and Pitch Range Navigation Exercises independently 90% of the time  Patient will express satisfaction with their voice quality and function and their ability to participate in social, personal, and work/school functions without limitation    Perioperative  Adhere to complete vocal rest recommendations in the acute post-operative period  Gradually increase voice use following the complete voice rest period  Adhere to vocal hygiene recommendations including smoking cessation, hydration, avoidance of caffeine and alcohol, and behavioral reflux precautions  Perform rescue breathing techniques several times daily  Incorporate SOVT exercises gradually in the immediate post-operative period   Participate in pre- and post-operative voice therapy      Billed Procedures:    Laryngoscopy without stroboscopy 51214  Laryngoscopy with stroboscopy 29193  Individual speech therapy session 87189  Perceptual voice assessment 65617  Laryngeal function studies 80340 with 52 modifier  Assessment and treatment time: *** minutes  Chart review, interpretation of testing, documentation preparation, etc: 7 minutes      Thank you for allowing me to participate in this patient's care,    Luz Adams MS CCC-SLP  Speech-Language Pathologist  [facility]  Department of Otolaryngology - Head and Neck Surgery  [facility]  [email or web address]  Direct: [phone]  [phone]    *This note may have been completed using [id number] dictation software, so errors may exist. Please contact me for clarification if needed*

## 2025-06-11 ENCOUNTER — VIRTUAL VISIT (OUTPATIENT)
Facility: CLINIC | Age: 50
End: 2025-06-11
Payer: MEDICARE

## 2025-06-11 ENCOUNTER — PREP FOR PROCEDURE (OUTPATIENT)
Dept: OTOLARYNGOLOGY | Facility: CLINIC | Age: 50
End: 2025-06-11

## 2025-06-11 DIAGNOSIS — F41.1 GENERALIZED ANXIETY DISORDER: ICD-10-CM

## 2025-06-11 DIAGNOSIS — F33.1 MAJOR DEPRESSIVE DISORDER, RECURRENT EPISODE, MODERATE (H): Primary | ICD-10-CM

## 2025-06-11 DIAGNOSIS — J38.01 VOCAL FOLD PARALYSIS, UNILATERAL: ICD-10-CM

## 2025-06-11 DIAGNOSIS — J38.3 GLOTTIC INSUFFICIENCY: Primary | ICD-10-CM

## 2025-06-11 PROCEDURE — 90834 PSYTX W PT 45 MINUTES: CPT | Mod: 95 | Performed by: PSYCHOLOGIST

## 2025-06-11 RX ORDER — OXYMETAZOLINE HYDROCHLORIDE 0.05 G/100ML
2 SPRAY NASAL ONCE
OUTPATIENT
Start: 2025-06-11 | End: 2025-06-11

## 2025-06-11 ASSESSMENT — ANXIETY QUESTIONNAIRES
7. FEELING AFRAID AS IF SOMETHING AWFUL MIGHT HAPPEN: SEVERAL DAYS
6. BECOMING EASILY ANNOYED OR IRRITABLE: SEVERAL DAYS
GAD7 TOTAL SCORE: 8
3. WORRYING TOO MUCH ABOUT DIFFERENT THINGS: MORE THAN HALF THE DAYS
5. BEING SO RESTLESS THAT IT IS HARD TO SIT STILL: NOT AT ALL
GAD7 TOTAL SCORE: 8
IF YOU CHECKED OFF ANY PROBLEMS ON THIS QUESTIONNAIRE, HOW DIFFICULT HAVE THESE PROBLEMS MADE IT FOR YOU TO DO YOUR WORK, TAKE CARE OF THINGS AT HOME, OR GET ALONG WITH OTHER PEOPLE: SOMEWHAT DIFFICULT
2. NOT BEING ABLE TO STOP OR CONTROL WORRYING: SEVERAL DAYS
1. FEELING NERVOUS, ANXIOUS, OR ON EDGE: MORE THAN HALF THE DAYS

## 2025-06-11 ASSESSMENT — PATIENT HEALTH QUESTIONNAIRE - PHQ9
10. IF YOU CHECKED OFF ANY PROBLEMS, HOW DIFFICULT HAVE THESE PROBLEMS MADE IT FOR YOU TO DO YOUR WORK, TAKE CARE OF THINGS AT HOME, OR GET ALONG WITH OTHER PEOPLE: SOMEWHAT DIFFICULT
SUM OF ALL RESPONSES TO PHQ QUESTIONS 1-9: 9
5. POOR APPETITE OR OVEREATING: SEVERAL DAYS
SUM OF ALL RESPONSES TO PHQ QUESTIONS 1-9: 9

## 2025-06-11 NOTE — PROGRESS NOTES
[facility]                                     Progress Note    Patient Name: Joel Pineda  Date: June 11, 2025          Service Type: Individual      Session Start Time: 2:01 PM Session End Time: 2:47 PM      Session Length:  47 minutes    Session #: 27    Attendees: Client attended alone    Service Modality:  Video Visit:      Provider verified identity through the following two step process.  Patient provided:  Patient photo and Patient is known previously to provider    Telemedicine Visit: The patient's condition can be safely assessed and treated via synchronous audio and visual telemedicine encounter.      Reason for Telemedicine Visit: Patient convenience (e.g. access to timely appointments / distance to available provider)    Originating Site (Patient Location): Patient's home    Distant Site (Provider Location): Provider Remote Setting- Home Office    Consent:  The patient/guardian has verbally consented to: the potential risks and benefits of telemedicine (video visit) versus in person care; bill my insurance or make self-payment for services provided; and responsibility for payment of non-covered services.     Patient would like the video invitation sent by:  Text to cell phone: [phone]    Mode of Communication:  Video Conference via Amwell    Distant Location (Provider):  Off-site    As the provider I attest to compliance with applicable laws and regulations related to telemedicine.    DATA  Interactive Complexity: No  Crisis: No        Progress Since Last Session (Related to Symptoms / Goals / Homework):   Symptoms: No change Tito continues to report low mood, ongoing anxiety and panic.    Homework: Achieved / completed to satisfaction       Episode of Care Goals: Satisfactory progress - CONTEMPLATION (Considering change and yet undecided); Intervened by assessing the negative and positive thinking (ambivalence) about behavior change     Current / Ongoing Stressors and  Concerns:   Tito reports he realized he was accidentally taking his Lunesta a few days, and noted this after feeling quite groggy. He wonders if his Flexiril is clouding his thinking and memory, despite benefit on pain. He will be pursuing procedure for his larynx, he is unable to find anyone for care following a twilight anaesthesia, so has opted for local instead. Trying to treat his mother with extra patience since her health issues, and paying extra care to be gentle and responsive to her. Mother will be turning 80 in August, he has been hearing a lot about people in their 80's dying lately. He recognizes that he can no longer avoid the inevitable that she is getting older and her health has been less stable recently. He would like to not be worried about her future and death. He wonders if he needs to see neurosurgeon, as Flexeril has been helpful in managing his pain. Discussed his SMART goal to shower 3-4 times a week, which he notes is easier when he has a visit scheduled, more so if the visit is in person. Cats have been 'extra snuggly' lately, he is unsure why. He notes he has not needed to use PRN Klonopin as often. He has been able to read his colleagues emails without feeling overwhelmed with anxiety or 'running through [facility] first'. He reports his colleague has been making some accusations that money is missing from the accounts, however he believes this is due to mis-reading of the budget and audits. Patient and provider discussed practicing radical patience with colleague on [employer], and that he is already practicing this with his mother.      Treatment Objective(s) Addressed in This Session:   identify three distraction and diversion activities and use those activities to decrease level of anxiety   and use cognitive strategies identified in therapy to challenge anxious thoughts, Increase interest, engagement, and pleasure in doing things  Decrease frequency and intensity of feeling down,  depressed, hopeless  Identify negative self-talk and behaviors: challenge core beliefs, myths, and actions, identify 5 traits or charcteristics you like about yourself, practice one mindfulness skill each day for 5 minutes and reduce their emotional vulnerability by 40% during the Emotion Regulation Module (6-8 weeks)      Intervention:      CBT: cognitive challenging, restructuring, reframing Behavioral activation techniques, grounding strategies  PCT: supportive autonomy, unconditional positive regard, empathic reflection, active listening  DBT: self-soothing with the 5 senses, TIP Skill, IMPROVE the moment, assertive communication/DEAR MAN    Assessments completed prior to visit:  The following assessments were completed by patient for this visit:  The following assessments were completed by patient for this visit:  PHQ9:       3/4/2025     2:20 PM 3/12/2025    11:35 AM 4/15/2025     2:32 PM 4/22/2025     2:28 PM 4/29/2025     3:35 PM 6/3/2025    11:18 AM 6/11/2025     1:11 PM   PHQ-9 SCORE   PHQ-9 Total Score MyChart 6 (Mild depression) 8 (Mild depression) 7 (Mild depression) 10 (Moderate depression) 9 (Mild depression) 9 (Mild depression) 9 (Mild depression)   PHQ-9 Total Score 6  8  7  10  9  9  9        Patient-reported     GAD7:       12/17/2024     2:51 PM 1/1/2025     3:50 PM 2/18/2025     2:18 PM 3/12/2025    11:39 AM 4/15/2025     2:35 PM 4/29/2025     3:35 PM 6/11/2025     2:25 PM   YUDI-7 SCORE   Total Score 12 (moderate anxiety) 8 (mild anxiety) 10 (moderate anxiety) 15 (severe anxiety) 12 (moderate anxiety) 13 (moderate anxiety)    Total Score 12  8  10  15  12  13  8       Patient-reported         ASSESSMENT: Current Emotional / Mental Status (status of significant symptoms):   Risk status (Self / Other harm or suicidal ideation)   Patient reports the following current fears or concerns for personal safety: Ongoing passive thoughts of suicide with absence of plan or intent and agreed to follow his  previously developed [place] Safety Plan.   Patient denies current or recent suicidal ideation or behaviors.   Patient denies current or recent homicidal ideation or behaviors.   Patient denies current or recent self injurious behavior or ideation.   Patient denies other safety concerns.   Patient reports there has been no change in risk factors since their last session.     Patient reports there has been no change in protective factors since their last session.     A safety and risk management plan has been developed including: Patient consented to co-developed safety plan on 10/21/2024.  Safety and risk management plan was reviewed.   Patient agreed to use safety plan should any safety concerns arise.  A copy was made available to the patient.     Appearance:   Appropriate    Eye Contact:   Poor   Psychomotor Behavior: Normal    Attitude:   Cooperative    Orientation:   All   Speech    Rate / Production: Impoverished     Volume:  Normal    Mood:    Anxious  Depressed  Sad    Affect:    Subdued  Worrisome    Thought Content:  Poverty of thought   Thought Form:  Coherent    Insight:    Fair      Medication Review:   Changes to psychiatric medications, see updated Medication List in EPIC.       Medication Compliance:   Yes - may not take some PRNs as often as he could     Changes in Health Issues:   None reported     Chemical Use Review:   Substance Use: Chemical use reviewed, no active concerns identified      Tobacco Use: No current tobacco use.      Diagnosis:  296.32 (F33.1) Major Depressive Disorder, Recurrent Episode, Moderate _.  300.02 (F41.1) Generalized Anxiety Disorder.    Collateral Reports Completed:   Not Applicable    PLAN: (Patient Tasks / Therapist Tasks / Other)    Patient to continue to use non-judgmental stance and cope ahead skills.     Patient and provider will continue practicing how to reframe automatic thinking from negative situations. Patient to cultivate an awareness of anticipating  triggers and predicting it will cause emotional distress.    Patient will practice radical self-compassion techniques to decrease shame.     Patient to engage in basic self-cares and focus on present moment.     Patient to explore strategies to increase motivation.     Patient to continue to use Reese Ahead skill for situations he anticipates will have negative outcome or evoke strong emotional response.    Patient to create SMART goal for showering 3-4 times per week and track progress with a non-judgmental stance and problem solving attitude.     Shirley Bartlett PsyD LP                                                      ______________________________________________________________________    Individual Treatment Plan    Patient's Name: Joel Pineda  YOB: 1975    Date of Creation: October 30, 2024   Date Treatment Plan Last Reviewed/Revised: October 30, 2024; January 29, 2025; April 30, 2025     DSM5 Diagnoses: 296.32 (F33.1) Major Depressive Disorder, Recurrent Episode, Moderate _ or 300.02 (F41.1) Generalized Anxiety Disorder  Psychosocial / Contextual Factors: Medical complexities, Trauma history, Interpersonal concerns.    recent surgery and resulting acute pain, chronic pain, chronic mental health concerns, mobility limitations  PROMIS (reviewed every 90 days):   PROMIS-10 Scores        5/20/2025     5:16 PM 5/31/2025    10:49 AM 6/11/2025     1:13 PM   PROMIS-10 Total Score w/o Sub Scores   PROMIS TOTAL - SUBSCORES 19  20  19        Patient-reported        Referral / Collaboration:  Referral to another professional/service is not indicated at this time..    Anticipated number of session for this episode of care: 9-12 sessions  Anticipation frequency of session: Weekly  Anticipated Duration of each session: 53 or more minutes  Treatment plan will be reviewed in 90 days or when goals have been changed.     MeasurableTreatment Goal(s) related to diagnosis / functional impairment(s)  Goal 1:  Patient will decrease anxiety by 75% as evidenced by YUDI-7    I will know I've met my goal when I am at peace with where I'm at.       Objective #A (Patient Action)                          Patient will identify the situations / thoughts that contribute to feeling anxious.  Status: Continued - Date(s):1/29/2025, 4/30/2025       Intervention(s)  Therapist will process anxiety-proving emotions.     Objective #B  Patient will use at least 3 coping skills for anxiety management in the next 4 weeks.  Status: Continued - Date(s):1/29/2025, 4/30/2025        Intervention(s)  Therapist will assign homework : coping skills practice to decrease anxiety .     Objective #C  Patient will use cognitive strategies identified in therapy to challenge anxious thoughts.  Status: Continued - Date(s):1/29/2025, 4/30/2025        Intervention(s)  Therapist will  teach the patient ways to reframe negative core beliefs that contribute to anxiety.     Goal 2 Patient will report pain levels are consistently rated at 2/10 per patient report.   I will know I've met my goal when my pain is less disruptive.      Objective #A (Patient Action)    Patient will identify 5 strategies for managing pain.  Status: Continued - Date(s):1/29/2025, 4/30/2025     Intervention(s)  Therapist will teach strategies to manage pain and assign homework to use 3 strategies daily.    Objective #B  Patient will engage in physical activity and PT exercises 2-3 times daily.  Status: Continued - Date(s):1/29/2025, 4/30/2025      Intervention(s)  Therapist will assign homework to engage in at minimum one PT or physical activity daily.    Objective #C  Patient will Identify negative self-talk and behaviors: challenge core beliefs, myths, and actions.  Status: Continued - Date(s):1/29/2025, 4/30/2025      Intervention(s)  Therapist will teach non-judgmental stance and help patient reframe negative self-talk .      Goal 3: Patient will report reduction in depressive symptoms  as evidenced by PHQ-9 score reduction of 75% or greater.     I will know I've met my goal when I feel like I have purpose in life.      Objective #A (Patient Action)    Status: Continued - Date(s):1/29/2025, 4/30/2025      Patient will Decrease frequency and intensity of feeling down, depressed, hopeless.    Intervention(s)  Therapist will teach emotional recognition/identification. Therapist will reinforce use of emotion regulation skills.    Objective #B  Patient will use healthy communication when describing his emotions or when setting boundaries with others.     Status: Continued - Date(s):1/29/2025, 4/30/2025      Intervention(s)  Therapist will teach emotional regulation skills and interpersonal effectiveness skills to improve quality of communication. Therapist will teach assertive communication skills.    Objective #C  Patient will track and record at least 3 pleasant exchanges with family members such as mother, father, sister, brother.  Status: Continued - Date(s):1/29/2025, 4/30/2025     Intervention(s)  Therapist will teach about healthy boundaries. Therapist will encourage patient to focus on positive interactions with family and use cope ahead to increase likelihood of pleasant experience as an outcome.    Patient has reviewed and agreed to the above plan.      Shirley Bartlett PsyD LP  April 30, 2025   Answers submitted by the patient for this visit:  Patient Health Questionnaire (Submitted on 12/3/2024)  If you checked off any problems, how difficult have these problems made it for you to do your work, take care of things at home, or get along with other people?: Very difficult  PHQ9 TOTAL SCORE: 10  Patient Health Questionnaire (G7) (Submitted on 12/3/2024)  YUDI 7 TOTAL SCORE: 9    Answers submitted by the patient for this visit:  Patient Health Questionnaire (Submitted on 12/17/2024)  If you checked off any problems, how difficult have these problems made it for you to do your work, take care of  things at home, or get along with other people?: Very difficult  PHQ9 TOTAL SCORE: 15  Patient Health Questionnaire (G7) (Submitted on 12/17/2024)  YUDI 7 TOTAL SCORE: 12    Answers submitted by the patient for this visit:  Patient Health Questionnaire (Submitted on 1/14/2025)  If you checked off any problems, how difficult have these problems made it for you to do your work, take care of things at home, or get along with other people?: Somewhat difficult  PHQ9 TOTAL SCORE: 10    [place] Safety Plan      Creation Date: 10/21/24       Step 1: Warning signs:    Warning Signs    Start thinking about death more      Step 2: Internal coping strategies - Things I can do to take my mind off my problems without contacting another person:    Strategies    Petting cats    Watching music videos    talk to family member    text friend Tono    hot bubble bath      Step 3: People and social settings that provide distraction:    Name Contact Information    Tono - friend [phone]    Mother [phone]         Step 4: People whom I can ask for help during a crisis:    Name Contact Information    Keyana - sister [phone]      Step 5: Professionals or agencies I can contact during a crisis:    Clinician/Agency Name Phone Emergency Contact    Shirley Bartlett [phone]    ANU CHENEY        [facility] Emergency Department Emergency Department Address Emergency Department Phone    [facility]       Suicide Prevention Lifeline Phone: Call or Text 285  Crisis Text Line: Text HOME to 364842     Step 6: Making the environment safer (plan for lethal means safety):   Previous medications - dispose of unused or older medications     Optional: What is most important to me and worth living for?:   Niece  Mother  Sister  Cats     [place] Safety Plan. Shanita Lorenzo. Used with permission of the authors.          Answers submitted by the patient for this visit:  Patient Health Questionnaire  (Submitted on 4/29/2025)  If you checked off any problems, how difficult have these problems made it for you to do your work, take care of things at home, or get along with other people?: Somewhat difficult  PHQ9 TOTAL SCORE: 9  Patient Health Questionnaire (G7) (Submitted on 4/29/2025)  YUDI 7 TOTAL SCORE: 13    Answers submitted by the patient for this visit:  Patient Health Questionnaire (Submitted on 6/3/2025)  If you checked off any problems, how difficult have these problems made it for you to do your work, take care of things at home, or get along with other people?: Somewhat difficult  PHQ9 TOTAL SCORE: 9    Answers submitted by the patient for this visit:  Patient Health Questionnaire (Submitted on 6/11/2025)  If you checked off any problems, how difficult have these problems made it for you to do your work, take care of things at home, or get along with other people?: Somewhat difficult  PHQ9 TOTAL SCORE: 9

## 2025-06-13 PROBLEM — J38.3 GLOTTIC INSUFFICIENCY: Status: ACTIVE | Noted: 2025-06-11

## 2025-06-13 PROBLEM — J38.01 VOCAL FOLD PARALYSIS, UNILATERAL: Status: ACTIVE | Noted: 2025-06-11

## 2025-06-14 DIAGNOSIS — E03.9 ACQUIRED HYPOTHYROIDISM: ICD-10-CM

## 2025-06-16 RX ORDER — LEVOTHYROXINE SODIUM 75 UG/1
75 TABLET ORAL EVERY MORNING
Qty: 90 TABLET | Refills: 0 | Status: SHIPPED | OUTPATIENT
Start: 2025-06-16

## 2025-06-17 ENCOUNTER — DOCUMENTATION ONLY (OUTPATIENT)
Dept: OTOLARYNGOLOGY | Facility: CLINIC | Age: 50
End: 2025-06-17
Payer: MEDICARE

## 2025-06-17 NOTE — PROGRESS NOTES
Plan of care certification for 2/11/25 through 6/10/25, faxed to [facility]. Fax number [phone] 6 pgs faxed.

## 2025-06-18 ENCOUNTER — VIRTUAL VISIT (OUTPATIENT)
Facility: CLINIC | Age: 50
End: 2025-06-18
Payer: MEDICARE

## 2025-06-18 DIAGNOSIS — F41.1 GENERALIZED ANXIETY DISORDER: ICD-10-CM

## 2025-06-18 DIAGNOSIS — F33.1 MAJOR DEPRESSIVE DISORDER, RECURRENT EPISODE, MODERATE (H): Primary | ICD-10-CM

## 2025-06-18 PROCEDURE — 90837 PSYTX W PT 60 MINUTES: CPT | Mod: 95 | Performed by: PSYCHOLOGIST

## 2025-06-18 ASSESSMENT — ANXIETY QUESTIONNAIRES
3. WORRYING TOO MUCH ABOUT DIFFERENT THINGS: SEVERAL DAYS
8. IF YOU CHECKED OFF ANY PROBLEMS, HOW DIFFICULT HAVE THESE MADE IT FOR YOU TO DO YOUR WORK, TAKE CARE OF THINGS AT HOME, OR GET ALONG WITH OTHER PEOPLE?: SOMEWHAT DIFFICULT
GAD7 TOTAL SCORE: 6
IF YOU CHECKED OFF ANY PROBLEMS ON THIS QUESTIONNAIRE, HOW DIFFICULT HAVE THESE PROBLEMS MADE IT FOR YOU TO DO YOUR WORK, TAKE CARE OF THINGS AT HOME, OR GET ALONG WITH OTHER PEOPLE: SOMEWHAT DIFFICULT
GAD7 TOTAL SCORE: 6
7. FEELING AFRAID AS IF SOMETHING AWFUL MIGHT HAPPEN: SEVERAL DAYS
4. TROUBLE RELAXING: SEVERAL DAYS
7. FEELING AFRAID AS IF SOMETHING AWFUL MIGHT HAPPEN: SEVERAL DAYS
1. FEELING NERVOUS, ANXIOUS, OR ON EDGE: SEVERAL DAYS
2. NOT BEING ABLE TO STOP OR CONTROL WORRYING: SEVERAL DAYS
5. BEING SO RESTLESS THAT IT IS HARD TO SIT STILL: NOT AT ALL
GAD7 TOTAL SCORE: 6
6. BECOMING EASILY ANNOYED OR IRRITABLE: SEVERAL DAYS

## 2025-06-18 ASSESSMENT — PATIENT HEALTH QUESTIONNAIRE - PHQ9
10. IF YOU CHECKED OFF ANY PROBLEMS, HOW DIFFICULT HAVE THESE PROBLEMS MADE IT FOR YOU TO DO YOUR WORK, TAKE CARE OF THINGS AT HOME, OR GET ALONG WITH OTHER PEOPLE: VERY DIFFICULT
SUM OF ALL RESPONSES TO PHQ QUESTIONS 1-9: 8
SUM OF ALL RESPONSES TO PHQ QUESTIONS 1-9: 8

## 2025-06-18 NOTE — PROGRESS NOTES
[facility]                                     Progress Note    Patient Name: Joel Pineda  Date: June 18, 2025          Service Type: Individual      Session Start Time: 2:00 PM Session End Time: 2:56 PM      Session Length:  56 minutes    Session #: 28    Attendees: Client attended alone    Service Modality:  Video Visit:      Provider verified identity through the following two step process.  Patient provided:  Patient photo and Patient is known previously to provider    Telemedicine Visit: The patient's condition can be safely assessed and treated via synchronous audio and visual telemedicine encounter.      Reason for Telemedicine Visit: Patient convenience (e.g. access to timely appointments / distance to available provider)    Originating Site (Patient Location): Patient's home    Distant Site (Provider Location): Provider Remote Setting- Home Office    Consent:  The patient/guardian has verbally consented to: the potential risks and benefits of telemedicine (video visit) versus in person care; bill my insurance or make self-payment for services provided; and responsibility for payment of non-covered services.     Patient would like the video invitation sent by:  Text to cell phone: [phone]    Mode of Communication:  Video Conference via Amwell    Distant Location (Provider):  Off-site    As the provider I attest to compliance with applicable laws and regulations related to telemedicine.    DATA  Interactive Complexity: No  Crisis: No        Progress Since Last Session (Related to Symptoms / Goals / Homework):   Symptoms: Improving Tito reports his mood is mildly improved, although he does continue to endorse intermittent thoughts of suicide related to specific triggers such as loss of his parents.    Homework: Achieved / completed to satisfaction       Episode of Care Goals: Satisfactory progress - CONTEMPLATION (Considering change and yet undecided); Intervened by assessing  the negative and positive thinking (ambivalence) about behavior change     Current / Ongoing Stressors and Concerns:   Tito reports he has decided to not attend the 'garage' NONA meeting tonight, and informed colleagues on NONA he looks forward to being debriefed at the official meeting tomorrow night. He has his throat injection scheduled, and has conciliation court for NONA meeting shortly thereafter. This is the first time he has had to do this, doesn't feel worried yet as he has other things on his schedule that are more pressing. Discussed his response to 's garage meeting, which was assertive and appropriate in terms of limit setting. He reports he is anticipating his mother and father's deaths, and what life will be like when his parents are no longer here. Discussed some strategies to cultivate memories, such as using the books his sister had purchased and assisting his mother in filling them in, recording her sharing stories, saving voicemails from his parents, asking his sister to send videos and photos she has taken, or a more formal method such as      Treatment Objective(s) Addressed in This Session:   identify three distraction and diversion activities and use those activities to decrease level of anxiety   and use cognitive strategies identified in therapy to challenge anxious thoughts, Increase interest, engagement, and pleasure in doing things  Decrease frequency and intensity of feeling down, depressed, hopeless  Identify negative self-talk and behaviors: challenge core beliefs, myths, and actions, identify 5 traits or charcteristics you like about yourself, practice one mindfulness skill each day for 5 minutes and reduce their emotional vulnerability by 40% during the Emotion Regulation Module (6-8 weeks)      Intervention:      CBT: cognitive challenging, restructuring, reframing Behavioral activation techniques, grounding strategies  PCT: supportive autonomy, unconditional positive  regard, empathic reflection, active listening  DBT: self-soothing with the 5 senses, TIP Skill, IMPROVE the moment, assertive communication/DEAR MAN    Assessments completed prior to visit:  The following assessments were completed by patient for this visit:  PHQ9:       3/12/2025    11:35 AM 4/15/2025     2:32 PM 4/22/2025     2:28 PM 4/29/2025     3:35 PM 6/3/2025    11:18 AM 6/11/2025     1:11 PM 6/18/2025     9:22 AM   PHQ-9 SCORE   PHQ-9 Total Score MyChart 8 (Mild depression) 7 (Mild depression) 10 (Moderate depression) 9 (Mild depression) 9 (Mild depression) 9 (Mild depression) 8 (Mild depression)   PHQ-9 Total Score 8  7  10  9  9  9  8        Patient-reported     GAD7:       1/1/2025     3:50 PM 2/18/2025     2:18 PM 3/12/2025    11:39 AM 4/15/2025     2:35 PM 4/29/2025     3:35 PM 6/11/2025     2:25 PM 6/18/2025     9:24 AM   YUDI-7 SCORE   Total Score 8 (mild anxiety) 10 (moderate anxiety) 15 (severe anxiety) 12 (moderate anxiety) 13 (moderate anxiety)  6 (mild anxiety)   Total Score 8  10  15  12  13  8 6        Patient-reported         ASSESSMENT: Current Emotional / Mental Status (status of significant symptoms):   Risk status (Self / Other harm or suicidal ideation)   Patient reports the following current fears or concerns for personal safety: Ongoing passive thoughts of suicide with absence of plan or intent and agreed to follow his previously developed [place] Safety Plan.   Patient denies current or recent suicidal ideation or behaviors.   Patient denies current or recent homicidal ideation or behaviors.   Patient denies current or recent self injurious behavior or ideation.   Patient denies other safety concerns.   Patient reports there has been no change in risk factors since their last session.     Patient reports there has been no change in protective factors since their last session.     A safety and risk management plan has been developed including: Patient consented to co-developed  safety plan on 10/21/2024.  Safety and risk management plan was reviewed.   Patient agreed to use safety plan should any safety concerns arise.  A copy was made available to the patient.     Appearance:   Appropriate    Eye Contact:   Poor   Psychomotor Behavior: Normal    Attitude:   Cooperative    Orientation:   All   Speech    Rate / Production: Impoverished     Volume:  Normal    Mood:    Anxious  Depressed  Sad    Affect:    Subdued  Worrisome    Thought Content:  Poverty of thought   Thought Form:  Coherent    Insight:    Fair      Medication Review:   Changes to psychiatric medications, see updated Medication List in EPIC.       Medication Compliance:   Yes - may not take some PRNs as often as he could     Changes in Health Issues:   None reported     Chemical Use Review:   Substance Use: Chemical use reviewed, no active concerns identified      Tobacco Use: No current tobacco use.      Diagnosis:  296.32 (F33.1) Major Depressive Disorder, Recurrent Episode, Moderate _.  300.02 (F41.1) Generalized Anxiety Disorder.    Collateral Reports Completed:   Not Applicable    PLAN: (Patient Tasks / Therapist Tasks / Other)    Patient to continue to use non-judgmental stance and cope ahead skills.     Patient and provider will continue practicing how to reframe automatic thinking from negative situations. Patient to cultivate an awareness of anticipating triggers and predicting it will cause emotional distress.    Patient will practice radical self-compassion techniques to decrease shame.     Patient to engage in basic self-cares and focus on present moment.     Patient to explore strategies to increase motivation.     Patient to continue to use [place] Ahead skill for situations he anticipates will have negative outcome or evoke strong emotional response.    Patient to create SMART goal for showering 3-4 times per week and track progress with a non-judgmental stance and problem solving attitude.     Shirley Bartlett PsyD  LP                                                      ______________________________________________________________________    Individual Treatment Plan    Patient's Name: Joel Pineda  YOB: 1975    Date of Creation: October 30, 2024   Date Treatment Plan Last Reviewed/Revised: October 30, 2024; January 29, 2025; April 30, 2025, June 18, 2025      DSM5 Diagnoses: 296.32 (F33.1) Major Depressive Disorder, Recurrent Episode, Moderate _ or 300.02 (F41.1) Generalized Anxiety Disorder  Psychosocial / Contextual Factors: Medical complexities, Trauma history, Interpersonal concerns.    recent surgery and resulting acute pain, chronic pain, chronic mental health concerns, mobility limitations  PROMIS (reviewed every 90 days):   PROMIS-10 Scores        5/20/2025     5:16 PM 5/31/2025    10:49 AM 6/11/2025     1:13 PM   PROMIS-10 Total Score w/o Sub Scores   PROMIS TOTAL - SUBSCORES 19  20  19        Patient-reported        Referral / Collaboration:  Referral to another professional/service is not indicated at this time..    Anticipated number of session for this episode of care: 9-12 sessions  Anticipation frequency of session: Every other week  Anticipated Duration of each session: 53 or more minutes  Treatment plan will be reviewed in 90 days or when goals have been changed.     MeasurableTreatment Goal(s) related to diagnosis / functional impairment(s)  Goal 1: Patient will decrease anxiety by 75% as evidenced by YUDI-7    I will know I've met my goal when I am at peace with where I'm at.       Objective #A (Patient Action)                          Patient will identify the situations / thoughts that contribute to feeling anxious.  Status: Continued - Date(s):1/29/2025, 4/30/2025, 6/18/2025       Intervention(s)  Therapist will process anxiety-proving emotions.     Objective #B  Patient will use at least 3 coping skills for anxiety management in the next 4 weeks.  Status: Continued - Date(s):1/29/2025,  4/30/2025, 6/18/2025       Intervention(s)  Therapist will assign homework : coping skills practice to decrease anxiety .     Objective #C  Patient will use cognitive strategies identified in therapy to challenge anxious thoughts.  Status: Continued - Date(s):1/29/2025, 4/30/2025, 6/18/2025        Intervention(s)  Therapist will  teach the patient ways to reframe negative core beliefs that contribute to anxiety.     Goal 2 Patient will report pain levels are consistently rated at 2/10 per patient report.   I will know I've met my goal when my pain is less disruptive.      Objective #A (Patient Action)    Patient will identify 5 strategies for managing pain.  Status: Continued - Date(s):1/29/2025, 4/30/2025, 6/18/2025     Intervention(s)  Therapist will teach strategies to manage pain and assign homework to use 3 strategies daily.    Objective #B  Patient will engage in physical activity and PT exercises 2-3 times daily.  Status: Continued - Date(s):1/29/2025, 4/30/2025, 6/18/2025      Intervention(s)  Therapist will assign homework to engage in at minimum one PT or physical activity daily.    Objective #C  Patient will Identify negative self-talk and behaviors: challenge core beliefs, myths, and actions.  Status: Continued - Date(s):1/29/2025, 4/30/2025, 6/18/2025      Intervention(s)  Therapist will teach non-judgmental stance and help patient reframe negative self-talk .      Goal 3: Patient will report reduction in depressive symptoms as evidenced by PHQ-9 score reduction of 75% or greater.     I will know I've met my goal when I feel like I have purpose in life.      Objective #A (Patient Action)    Status: Continued - Date(s):1/29/2025, 4/30/2025, 6/18/2025      Patient will Decrease frequency and intensity of feeling down, depressed, hopeless.    Intervention(s)  Therapist will teach emotional recognition/identification. Therapist will reinforce use of emotion regulation skills.    Objective #B  Patient will  use healthy communication when describing his emotions or when setting boundaries with others.     Status: Continued - Date(s):1/29/2025, 4/30/2025, 6/18/2025    Intervention(s)  Therapist will teach emotional regulation skills and interpersonal effectiveness skills to improve quality of communication. Therapist will teach assertive communication skills.    Objective #C  Patient will track and record at least 3 pleasant exchanges with family members such as mother, father, sister, brother.  Status: Continued - Date(s):1/29/2025, 4/30/2025, 6/18/2025     Intervention(s)  Therapist will teach about healthy boundaries. Therapist will encourage patient to focus on positive interactions with family and use cope ahead to increase likelihood of pleasant experience as an outcome.    Patient has reviewed and agreed to the above plan.      Shirley Bartlett PsyD LP  June 18, 2025      Answers submitted by the patient for this visit:  Patient Health Questionnaire (Submitted on 12/3/2024)  If you checked off any problems, how difficult have these problems made it for you to do your work, take care of things at home, or get along with other people?: Very difficult  PHQ9 TOTAL SCORE: 10  Patient Health Questionnaire (G7) (Submitted on 12/3/2024)  YUDI 7 TOTAL SCORE: 9    Answers submitted by the patient for this visit:  Patient Health Questionnaire (Submitted on 12/17/2024)  If you checked off any problems, how difficult have these problems made it for you to do your work, take care of things at home, or get along with other people?: Very difficult  PHQ9 TOTAL SCORE: 15  Patient Health Questionnaire (G7) (Submitted on 12/17/2024)  YUDI 7 TOTAL SCORE: 12    Answers submitted by the patient for this visit:  Patient Health Questionnaire (Submitted on 1/14/2025)  If you checked off any problems, how difficult have these problems made it for you to do your work, take care of things at home, or get along with other people?: Somewhat  difficult  PHQ9 TOTAL SCORE: 10    [place] Safety Plan      Creation Date: 10/21/24       Step 1: Warning signs:    Warning Signs    Start thinking about death more      Step 2: Internal coping strategies - Things I can do to take my mind off my problems without contacting another person:    Strategies    Petting cats    Watching music videos    talk to family member    text friend Tono    hot bubble bath      Step 3: People and social settings that provide distraction:    Name Contact Information    Tono - friend [phone]    Mother [phone]         Step 4: People whom I can ask for help during a crisis:    Name Contact Information    [place]ana - sister [phone]      Step 5: Professionals or agencies I can contact during a crisis:    Clinician/Agency Name Phone Emergency Contact    Shirley Bartlett [phone]    ANU CHENEY        [facility] Emergency [facility] Emergency Department Address Emergency Department Phone    [facility]       Suicide Prevention Lifeline Phone: Call or Text 889  Crisis Text Line: Text HOME to 709429     Step 6: Making the environment safer (plan for lethal means safety):   Previous medications - dispose of unused or older medications     Optional: What is most important to me and worth living for?:   Niece  Mother  Sister  Cats     [place] Safety Plan. Shanita Lazcano and Ariel Lorenzo. Used with permission of the authors.          Answers submitted by the patient for this visit:  Patient Health Questionnaire (Submitted on 4/29/2025)  If you checked off any problems, how difficult have these problems made it for you to do your work, take care of things at home, or get along with other people?: Somewhat difficult  PHQ9 TOTAL SCORE: 9  Patient Health Questionnaire (G7) (Submitted on 4/29/2025)  YUDI 7 TOTAL SCORE: 13    Answers submitted by the patient for this visit:  Patient Health Questionnaire (Submitted on 6/3/2025)  If you checked off any problems, how  difficult have these problems made it for you to do your work, take care of things at home, or get along with other people?: Somewhat difficult  PHQ9 TOTAL SCORE: 9    Answers submitted by the patient for this visit:  Patient Health Questionnaire (Submitted on 6/11/2025)  If you checked off any problems, how difficult have these problems made it for you to do your work, take care of things at home, or get along with other people?: Somewhat difficult  PHQ9 TOTAL SCORE: 9

## 2025-06-23 ENCOUNTER — MYC REFILL (OUTPATIENT)
Dept: PALLIATIVE MEDICINE | Facility: OTHER | Age: 50
End: 2025-06-23
Payer: MEDICARE

## 2025-06-23 DIAGNOSIS — M45.8 ANKYLOSING SPONDYLITIS OF SACRAL REGION (H): ICD-10-CM

## 2025-06-23 DIAGNOSIS — G62.9 PERIPHERAL POLYNEUROPATHY: ICD-10-CM

## 2025-06-23 DIAGNOSIS — G89.29 CHRONIC INTRACTABLE PAIN: ICD-10-CM

## 2025-06-23 DIAGNOSIS — M47.816 LUMBAR FACET ARTHROPATHY: ICD-10-CM

## 2025-06-23 DIAGNOSIS — G43.111 INTRACTABLE MIGRAINE WITH AURA WITH STATUS MIGRAINOSUS: ICD-10-CM

## 2025-06-23 RX ORDER — OXYCODONE HYDROCHLORIDE 5 MG/1
5 TABLET ORAL EVERY 6 HOURS PRN
Qty: 60 TABLET | Refills: 0 | Status: SHIPPED | OUTPATIENT
Start: 2025-06-23

## 2025-06-23 NOTE — TELEPHONE ENCOUNTER
6/23/25 10:12 AM  Refills have been requested for the following medications:         oxyCODONE (ROXICODONE) 5 MG tablet [Stacia Jack]     Preferred pharmacy: [id number] [id number] [address]

## 2025-06-23 NOTE — TELEPHONE ENCOUNTER
Medication refill information reviewed.     Due date for oxyCODONE (ROXICODONE) 5 MG tablet is 06/30/25     Prescriptions prepped for review.     Will route to provider.

## 2025-06-23 NOTE — TELEPHONE ENCOUNTER
Received request for a refill(s) of oxyCODONE (ROXICODONE) 5 MG tablet      Last dispensed from pharmacy on 05/29/25    Patient's last office/virtual visit by prescribing provider on 05/15/25  Next office/virtual appointment scheduled for 07/14/25    Last urine drug screen date 01/27/25  Current opioid agreement on file (completed within the last year) Yes Date of opioid agreement: 01/27/25    E-prescribe to pharmacy-[id number] - [place], [place] - [address] [address]     Will route to nursing [place] for review and preparation of prescription(s).

## 2025-06-25 ENCOUNTER — TELEPHONE (OUTPATIENT)
Dept: OTOLARYNGOLOGY | Facility: CLINIC | Age: 50
End: 2025-06-25
Payer: MEDICARE

## 2025-07-01 DIAGNOSIS — E55.9 VITAMIN D DEFICIENCY: ICD-10-CM

## 2025-07-01 RX ORDER — CHOLECALCIFEROL (VITAMIN D3) 1250 MCG
CAPSULE ORAL
Qty: 24 CAPSULE | Refills: 0 | Status: SHIPPED | OUTPATIENT
Start: 2025-07-01

## 2025-07-02 ENCOUNTER — OFFICE VISIT (OUTPATIENT)
Dept: PULMONOLOGY | Facility: CLINIC | Age: 50
End: 2025-07-02
Attending: INTERNAL MEDICINE
Payer: MEDICARE

## 2025-07-02 ENCOUNTER — VIRTUAL VISIT (OUTPATIENT)
Dept: PHARMACY | Facility: CLINIC | Age: 50
End: 2025-07-02
Attending: INTERNAL MEDICINE
Payer: MEDICARE

## 2025-07-02 VITALS
BODY MASS INDEX: 35.68 KG/M2 | WEIGHT: 293.1 LBS | OXYGEN SATURATION: 96 % | DIASTOLIC BLOOD PRESSURE: 87 MMHG | HEART RATE: 96 BPM | SYSTOLIC BLOOD PRESSURE: 125 MMHG

## 2025-07-02 DIAGNOSIS — E11.8 TYPE 2 DIABETES MELLITUS WITH COMPLICATION, WITHOUT LONG-TERM CURRENT USE OF INSULIN (H): Primary | ICD-10-CM

## 2025-07-02 DIAGNOSIS — J45.30 MILD PERSISTENT ASTHMA, UNSPECIFIED WHETHER COMPLICATED: ICD-10-CM

## 2025-07-02 PROCEDURE — 3074F SYST BP LT 130 MM HG: CPT | Performed by: INTERNAL MEDICINE

## 2025-07-02 PROCEDURE — G2211 COMPLEX E/M VISIT ADD ON: HCPCS | Performed by: INTERNAL MEDICINE

## 2025-07-02 PROCEDURE — 99215 OFFICE O/P EST HI 40 MIN: CPT | Performed by: INTERNAL MEDICINE

## 2025-07-02 PROCEDURE — 3079F DIAST BP 80-89 MM HG: CPT | Performed by: INTERNAL MEDICINE

## 2025-07-02 RX ORDER — ALBUTEROL SULFATE 90 UG/1
2 INHALANT RESPIRATORY (INHALATION) EVERY 6 HOURS PRN
Qty: 25.5 G | Refills: 3 | Status: SHIPPED | OUTPATIENT
Start: 2025-07-02

## 2025-07-02 RX ORDER — CETIRIZINE HYDROCHLORIDE 5 MG/1
5 TABLET ORAL DAILY
COMMUNITY

## 2025-07-02 ASSESSMENT — ASTHMA QUESTIONNAIRES
QUESTION_5 LAST FOUR WEEKS HOW WOULD YOU RATE YOUR ASTHMA CONTROL: WELL CONTROLLED
QUESTION_3 LAST FOUR WEEKS HOW OFTEN DID YOUR ASTHMA SYMPTOMS (WHEEZING, COUGHING, SHORTNESS OF BREATH, CHEST TIGHTNESS OR PAIN) WAKE YOU UP AT NIGHT OR EARLIER THAN USUAL IN THE MORNING: NOT AT ALL
ACUTE_EXACERBATION_TODAY: NO
ACT_TOTALSCORE: 21
QUESTION_4 LAST FOUR WEEKS HOW OFTEN HAVE YOU USED YOUR RESCUE INHALER OR NEBULIZER MEDICATION (SUCH AS ALBUTEROL): TWO OR THREE TIMES PER WEEK
ACT_TOTALSCORE: 21
QUESTION_2 LAST FOUR WEEKS HOW OFTEN HAVE YOU HAD SHORTNESS OF BREATH: ONCE OR TWICE A WEEK
QUESTION_1 LAST FOUR WEEKS HOW MUCH OF THE TIME DID YOUR ASTHMA KEEP YOU FROM GETTING AS MUCH DONE AT WORK, SCHOOL OR AT HOME: NONE OF THE TIME

## 2025-07-02 NOTE — PROGRESS NOTES
Pulmonary Clinic Return Patient Visit  Reason for Visit: Asthma  History of Present Illness  Joel Pineda is a pleasant 50-year-old male with a history of ankylosing spondylitis on Humira, acid reflux, allergies, and asthma who presents to pulmonary clinic today for further follow-up of asthma. I last saw him in clinic in 6/2024.   To briefly review, history of childhood asthma had been decently well controlled on high dose Advair, Singulair, and albuterol as needed and pulmonary function has historically been normal.  Previous attempts to de-escalate asthma therapy yielded nearly immediate return to symptoms and he was left on the same regimen for the last several years.  Hx of pulmonary embolism in early 40s - not on any blood thinners now. Not sure if it is provoked and was treated with warfarin for 6 months. When I saw him in clinic, asthma control was excellent with an ACT score of 21 and I de-escalated his regimen to intermediate dose Fluticasone/salmaterol 250/50 mcg with continued good control. He was switched to low Breo inhaler due to insurance preference with continued excellent asthma control.  He has been dealing with a lot of voice hoarseness which was initially thought to be due to his inhalers but even after cessation, symptoms were persistent. He has been followed by ENT- Dr. Patino and is currently getting steroid injections which has been helpful. He is scheduled to have a vocal cord gel application later this month- 7/2025.  He has lost considerable weight since starting Ozempic after referral to the weight management clinic.   No flares recently on steroid burst treatments.   He also has GERD which is fairly controlled on Prilosec 20 mg daily. He also has LLOYD and uses his CPAP regularly.   Never smoker. 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    Review of Systems:  10 of 14 systems reviewed and are negative  unless otherwise stated in HPI.    Past Medical History:   Diagnosis Date    Acne     Acquired hypothyroidism     Allergic state     Ankylosing spondylitis lumbar region (H)     Ankylosing spondylitis of sacral region (H)     Anxiety     Bipolar 2 disorder (H)     Chest pain     Chest pain, regulated w/BP meds. Clear arteries.    Chronic pain     Chronic, continuous use of opioids     DDD (degenerative disc disease), lumbar     Depressive disorder     Diabetes (H)     Diverticulosis     Dysautonomia (H)     Facet arthritis of cervical region     Gastroesophageal reflux disease     Hypertension     IBS (irritable bowel syndrome)     Intracranial arachnoid cyst     Lumbar radiculopathy     Major depressive disorder, recurrent episode     Multiple psych providers - they manage meds August 2015: Provigil induced severe mood dis-function     Major depressive disorder, recurrent episode, severe (H) 12/02/2020    MDD (major depressive disorder), recurrent severe, without psychosis (H) 07/21/2021    Mixed dyslipidemia 04/06/2023    Obese     LLOYD (obstructive sleep apnea)- mild (AHI 11)     Polyneuropathy     POTS (postural orthostatic tachycardia syndrome)     Pulmonary embolism (H)     Skin exam, screening for cancer 12/03/2013    Sleep apnea     Thrombosis     Uncomplicated asthma        Past Surgical History:   Procedure Laterality Date    BACK SURGERY  10/2007    lumbar discectomy L5-S1    BIOPSY  09/15/2020    Colon Adenomas x2    COLONOSCOPY      Note: colon[facility] scheduled [facility] on Friday, 9/4/15    COSMETIC SURGERY  2012    Nose Exterior - functional    GI SURGERY  08/2013    Sigmoidectomy    HERNIA REPAIR, UMBILICAL  08/23/2011    smallDr. Evan    INCISION AND DRAINAGE, ABSCESS, COMPLEX  08/23/2011    tomer, Dr. Evan Beavers    LAMINECTOMY LUMBAR TWO LEVELS Bilateral 9/25/2024    Procedure: bilateral lumbar 4-lumbar 5 hemilaminectomies, medial facetectomies and foraminotomies with left lumbar  "microdiscectomy and lumbar 5-sacral 1 foraminotomies;  Surgeon: Anitha Biggs MD;  Location: [facility] OR    LAPAROSCOPIC ASSISTED COLECTOMY LEFT (DESCENDING)  08/15/2013    Procedure: LAPAROSCOPIC ASSISTED COLECTOMY LEFT (DESCENDING);  Laparoscopic Hand Assisted Sigmoid Resection, Mobilization of Splenic Fissure, coloproctoscopy, *Latex Free Room* Anesthesia General with Pain block  ;  Surgeon: Aurora Justice MD;  Location: [facility] OR    NERVE SURGERY  08/18/2011    RF ablation @ L3-S1 @ [facility]    RECONSTRUCT NOSE AND SEPTUM (FUNCTIONAL)  10/14/2011    Procedure:RECONSTRUCT NOSE AND SEPTUM (FUNCTIONAL); Functional Septorhinoplasty, Turbinate Reduction, ; Surgeon:CEDRIC CUEVAS; Location:[facility] OR    SINUS SURGERY  10/01/2001    ethmoidectomy chronic sinusitis    SOFT TISSUE SURGERY  4/7/2022    Hidradenitis Suppurativa surgery       Family History   Problem Relation Age of Onset    Musculoskeletal Disorder Mother         back    Anxiety Disorder Mother     Colon Polyps Mother     Ulcerative Colitis Mother         and ischemic small intestine, surgery    Hypertension Mother     Breast Cancer Mother     Osteoporosis Mother     Diabetes Mother         Type 2, Diagnosed in 2014    Depression Mother         Takes Cymbalta to help with chronic pain + depx    Thyroid Disease Mother         Hypothyroidism    Obesity Mother         Under much better control latter half of 2015    Cataracts Mother     Musculoskeletal Disorder Father         back    Substance Abuse Father         Alcohol    Hypertension Father     Hyperlipidemia Father     Depression Father         Off meds for many years. Seems \"ok\"    Anxiety Disorder Father     Heart Disease Maternal Grandmother     Heart Disease Maternal Grandfather     Psychotic Disorder Paternal Grandfather     Suicide Paternal Grandfather     Depression Paternal Grandfather         Pediatrician. Committed suicide by pistol in 1990.    Musculoskeletal Disorder Brother         back "    Depression Brother         Expressed as anger and moodiness    Substance Abuse Brother         Alcohol    Anxiety Disorder Brother     Pulmonary Embolism Brother     Substance Abuse Sister         Alcohol    Depression Sister         Mental Health Therapist, yet no anti-depressants?    Anxiety Disorder Sister         Mental Health Therapist, yet no anti-anxiety meds?    Deep Vein Thrombosis (DVT) Sister         acssociated with OCP use    Bladder Cancer Other     Colon Cancer No family hx of     Crohn's Disease No family hx of     Anesthesia Reaction No family hx of     Skin Cancer No family hx of     Bleeding Disorder No family hx of     Glaucoma No family hx of     Macular Degeneration No family hx of        Social History     Socioeconomic History    Marital status: Single     Spouse name: Not on file    Number of children: Not on file    Years of education: Not on file    Highest education level: Not on file   Occupational History    Occupation:      Employer: [employer]    Occupation: paraprofessional     Comment: [employer]     Employer: [employer]   Social Needs    Financial resource strain: Not on file    Food insecurity     Worry: Not on file     Inability: Not on file    Transportation needs     Medical: Not on file     Non-medical: Not on file   Tobacco Use    Smoking status: Never Smoker    Smokeless tobacco: Never Used   Substance and Sexual Activity    Alcohol use: Not Currently     Alcohol/week: 0.0 standard drinks     Drinks per session: 1 or 2     Binge frequency: Weekly     Comment: occ 1/week    Drug use: No    Sexual activity: Never     Partners: Female     Birth control/protection: None   Lifestyle    Physical activity     Days per week: Not on file     Minutes per session: Not on file    Stress: Not on file   Relationships    Social connections     Talks on phone: Not on file     Gets together: Not on file     Attends Yazdanism service: Not on file     Active member of club  or organization: Not on file     Attends meetings of clubs or organizations: Not on file     Relationship status: Not on file    Intimate partner violence     Fear of current or ex partner: Not on file     Emotionally abused: Not on file     Physically abused: Not on file     Forced sexual activity: Not on file   Other Topics Concern    Parent/sibling w/ CABG, MI or angioplasty before 65F 55M? No     Service Not Asked    Blood Transfusions Not Asked    Caffeine Concern Not Asked    Occupational Exposure Not Asked    Hobby Hazards Not Asked    Sleep Concern Yes    Stress Concern Yes    Weight Concern Not Asked    Special Diet Not Asked    Back Care Yes    Exercise Not Asked    Bike Helmet Not Asked    Seat Belt Yes    Self-Exams Not Asked   Social History Narrative    Not on file         Allergies   Allergen Reactions    Amoxicillin-Pot Clavulanate Difficulty breathing    Banana Shortness Of Breath     Pt reports organic Banana is okay.     Clavulanic Acid Anaphylaxis     Other Reaction(s): Throat swelling    Nitroglycerin Palpitations    Penicillins Anaphylaxis    Provigil [Modafinil] Shortness Of Breath     headache    Gadolinium Hives and Itching     Patient was premedicated for the contrast allergy. He did still have a reaction a few hours after injection. Hives and itching. Dr. Goemz told tech to inform pt he should only have contrast again in the future when premedicated and at a hospital. Not at an outpatient facility.     Haemophilus B Polysaccharide Vaccine Rash     Quadrivalent, cell based    Influenza Virus Vaccine Rash     Quadrivalent, cell based    Ketoconazole      Topical cream caused swelling and itching    Dye [Contrast Dye] Other (See Comments) and Hives     Moderate flushing, CT contrast  Iodinated and gadolinium    Formoterol     Gabapentin      Other reaction(s): hives    Golimumab      Hives, bradycardia, face swelling    Mometasone     Naproxen      Other reaction(s): Bleeding  Gums    Neurontin [Gabapentin] Hives     Moderate hives    Nortriptyline Hives    Nystatin Hives    Varicella Virus Vaccine Live      Rash    Adhesive Tape Rash    Baclofen Other (See Comments)     Cognitive changes    Cefdinir Blisters, Dermatitis, Difficulty breathing, Other (See Comments) and Rash    Flagyl [Metronidazole Hcl] Palpitations and Hives    Latex Rash    Metronidazole Palpitations, Other (See Comments) and Rash     dizziness (versus ciprofloxacin taken at same time)    Sulfamethizole Rash     Other Reaction(s): Rash         Current Outpatient Medications:     acetaminophen 500 MG CAPS, Take 500 mg by mouth every morning., Disp: , Rfl:     albuterol (PROAIR HFA/PROVENTIL HFA/VENTOLIN HFA) 108 (90 Base) MCG/ACT inhaler, Inhale 2 puffs into the lungs every 6 hours as needed for shortness of breath, wheezing or cough 90 day supply, Disp: 25.5 g, Rfl: 3    bisacodyl (DULCOLAX) 5 MG EC tablet, Take 10 mg by mouth as needed for constipation, Disp: , Rfl:     buPROPion (WELLBUTRIN XL) 300 MG 24 hr tablet, Take 1 tablet by mouth daily, Disp: , Rfl:     cetirizine (ZYRTEC) 10 MG tablet, , Disp: , Rfl:     cholecalciferol (D3-50) 1250 mcg (65319 units) capsule, TAKE ONE CAPSULE BY MOUTH EVERY 2 WEEKS, Disp: 24 capsule, Rfl: 0    clonazePAM (KLONOPIN) 0.5 MG tablet, Take 1 mg by mouth at bedtime. And 0.5mg daily as needed. Uses at bedtime for RBD, Disp: , Rfl:     cyanocobalamin (CYANOCOBALAMIN) 1000 mcg/mL injection, INJECT 1ML INTO THE MUSCLE EVERY 30 DAYS, Disp: 3 mL, Rfl: 2    cyclobenzaprine (FLEXERIL) 5 MG tablet, Take 1 tablet (5 mg) by mouth 3 times daily as needed for muscle spasms., Disp: 90 tablet, Rfl: 1    diphenhydrAMINE (BENADRYL) 25 MG capsule, Take 25 mg by mouth once a week Before Enbrel injection, Disp: , Rfl:     divalproex sodium delayed-release (DEPAKOTE) 125 MG DR tablet, Take 250 mg by mouth 2 times daily., Disp: , Rfl:     DULoxetine (CYMBALTA) 60 MG capsule, Take 120 mg by mouth daily ,  Disp: , Rfl:     ENBREL SURECLICK 50 MG/ML autoinjector, INJECT THE CONTENTS OF ONE AUTOINJECTOR PEN UNDER THE SKIN EVERY 7 DAYS., Disp: 4 mL, Rfl: 5    EPINEPHrine (ANY BX GENERIC EQUIV) 0.3 MG/0.3ML injection 2-pack, Inject 0.3 mLs (0.3 mg) into the muscle as needed for anaphylaxis May repeat one time in 5-15 minutes if response to initial dose is inadequate., Disp: 2 each, Rfl: 3    eszopiclone (LUNESTA) 3 MG tablet, Take 3 mg by mouth At Bedtime , Disp: , Rfl:     famotidine (PEPCID) 20 MG tablet, Take 1 tablet (20 mg) by mouth daily., Disp: 90 tablet, Rfl: 1    fenofibrate (TRICOR) 48 MG tablet, TAKE ONE TABLET BY MOUTH ONCE DAILY, Disp: 90 tablet, Rfl: 3    fluticasone (FLONASE) 50 MCG/ACT nasal spray, Spray 2 sprays in nostril every evening., Disp: 48 g, Rfl: 2    fluticasone-vilanterol (BREO ELLIPTA) 100-25 MCG/ACT inhaler, Inhale 1 puff into the lungs daily., Disp: 3 each, Rfl: 3    galcanezumab-gnlm (EMGALITY) 120 MG/ML injection, Inject 120 mg subcutaneously every 28 days., Disp: , Rfl:     ketotifen fumarate 0.035% 0.035 % SOLN ophthalmic solution, Place 1 drop into both eyes daily as needed for itching., Disp: , Rfl:     levothyroxine (SYNTHROID/LEVOTHROID) 75 MCG tablet, TAKE ONE TABLET BY MOUTH EVERY MORNING, Disp: 90 tablet, Rfl: 0    lidocaine (XYLOCAINE) 5 % external ointment, Apply topically 2 times daily as needed for moderate pain., Disp: 50 g, Rfl: 5    lidocaine, viscous, (XYLOCAINE) 2 % solution, Swish and spit 15 mLs in mouth every 3 hours as needed for moderate pain. ; Max 8 doses/24 hour period., Disp: 100 mL, Rfl: 1    lurasidone (LATUDA) 40 MG TABS tablet, Take 40 mg by mouth daily with food., Disp: , Rfl:     MAGNESIUM GLYCINATE PO, Take 480 mg by mouth daily., Disp: , Rfl:     Melatonin 10 MG TABS tablet, Take 10 mg by mouth at bedtime, Disp: , Rfl:     metoclopramide (REGLAN) 5 MG tablet, Take 5 mg by mouth 4 times daily as needed., Disp: , Rfl:     metoprolol succinate ER (TOPROL XL)  "100 MG 24 hr tablet, TAKE 1 TABLET BY MOUTH IN THE EVENING; TO BE USED WITH 200MG IN MORNING, Disp: 90 tablet, Rfl: 2    metoprolol succinate ER (TOPROL XL) 200 MG 24 hr tablet, Take 1 tablet (200 mg) by mouth daily., Disp: 90 tablet, Rfl: 1    montelukast (SINGULAIR) 10 MG tablet, TAKE ONE TABLET BY MOUTH EVERY EVENING, Disp: 90 tablet, Rfl: 3    multivitamin w/minerals (MULTI-VITAMIN) tablet, Take 1 tablet by mouth daily., Disp: , Rfl:     nabumetone (RELAFEN) 500 MG tablet, TAKE 1 TO 2 TABLETS BY MOUTH TWICE DAILY AS NEEDED FOR MODERATE PAIN, Disp: 120 tablet, Rfl: 0    naloxone (NARCAN) 4 MG/0.1ML nasal spray, Spray 1 spray (4 mg) into one nostril alternating nostrils as needed for opioid reversal. every 2-3 minutes until assistance arrives. This medication is an antidote and stays on the chart as an \"as needed\" medication, Disp: 2 each, Rfl: 2    omega-3 acid ethyl esters (LOVAZA) 1 g capsule, TAKE TWO CAPSULES BY MOUTH TWICE DAILY, Disp: 360 capsule, Rfl: 2    omeprazole (PRILOSEC) 20 MG DR capsule, Take 1 capsule (20 mg) by mouth daily., Disp: 90 capsule, Rfl: 2    oxyCODONE (ROXICODONE) 5 MG tablet, Take 1 tablet (5 mg) by mouth every 6 hours as needed for breakthrough pain. Fill 06/28/25 to start 06/30/25. 30 days supply for chronic pain, Disp: 60 tablet, Rfl: 0    pregabalin (LYRICA) 150 MG capsule, TAKE ONE CAPSULE BY MOUTH THREE TIMES DAILY, Disp: 270 capsule, Rfl: 0    pseudoePHEDrine-guaiFENesin (MUCINEX D)  MG TB12, Take 1 tablet by mouth 2 times daily as needed (congestion)., Disp: , Rfl:     pyridostigmine (MESTINON) 60 MG tablet, Take 1 tablet by mouth 3 times daily, Disp: , Rfl:     ramipril (ALTACE) 10 MG capsule, TAKE ONE CAPSULE BY MOUTH ONCE DAILY, Disp: 90 capsule, Rfl: 2    Riboflavin 400 MG TABS, Take 400 mg by mouth daily., Disp: , Rfl:     rosuvastatin (CRESTOR) 40 MG tablet, TAKE ONE TABLET BY MOUTH ONCE DAILY, Disp: 90 tablet, Rfl: 3    sodium fluoride 1.1 % CREA, At Bedtime, " "Disp: , Rfl:     syringe/needle, disp, (B-D LUER-LUCILLE SYRINGE) 25G X 1\" 3 ML MISC, USE WITH B12 INJECTIONS, Disp: 12 each, Rfl: 3    tirzepatide (MOUNJARO) 5 MG/0.5ML SOAJ auto-injector pen, Inject 0.5 mLs (5 mg) subcutaneously every 7 days., Disp: 2 mL, Rfl: 1    UBRELVY 100 MG tablet, Take 100 mg by mouth at onset of headache, Disp: , Rfl:     valACYclovir (VALTREX) 500 MG tablet, Take 1 tablet (500 mg) by mouth daily. (Patient taking differently: Take 500 mg by mouth daily. Taking 1000 mg three times daily by mouth), Disp: 90 tablet, Rfl: 2    vitamin B complex with vitamin C (STRESS TAB) tablet, Take 1 tablet by mouth daily, Disp: , Rfl:     ZINC SULFATE-VITAMIN C MT, Take 1 tablet by mouth daily, Disp: , Rfl:     fexofenadine (ALLEGRA) 180 MG tablet, Take 180 mg by mouth every evening., Disp: , Rfl:       Physical Exam:  /87 (BP Location: Left arm, Patient Position: Chair, Cuff Size: Adult Large)   Pulse 96   Wt 132.9 kg (293 lb 1.6 oz)   SpO2 96%   BMI 35.68 kg/m    GENERAL: Well developed, well nourished, alert, and in no apparent distress.  HEENT: Normocephalic, atraumatic. PERRL, EOMI. Oral mucosa is moist. No perioral cyanosis.  NECK: supple, no masses, no thyromegaly.  RESP:  Normal respiratory effort.  CTAB.  No rales, wheezes, rhonchi.  No cyanosis or clubbing.  CV: Normal S1, S2, regular rhythm, normal rate. No murmur.  No LE edema.   ABDOMEN:  Soft, non-tender, non-distended.   SKIN: warm and dry. No rash.  NEURO: AAOx3.  Normal gait.  Fluent speech.  PSYCH: mentation appears normal.     Results:  PFTs: Normal lung volumes, flows, volume loops and preserved diffusion.  Most Recent [facility] Pulmonary Function Testing    FVC-Pred   Date Value Ref Range Status   06/02/2021 6.19 L      FVC-Pre   Date Value Ref Range Status   06/02/2021 5.29 L      FVC-%Pred-Pre   Date Value Ref Range Status   06/02/2021 85 %      FEV1-Pre   Date Value Ref Range Status   06/02/2021 4.38 L      FEV1-%Pred-Pre   Date " "Value Ref Range Status   06/02/2021 90 %      FEV1FVC-Pred   Date Value Ref Range Status   06/02/2021 79 %      FEV1FVC-Pre   Date Value Ref Range Status   06/02/2021 83 %      No results found for: \"20029\"  FEFMax-Pred   Date Value Ref Range Status   06/02/2021 11.37 L/sec      FEFMax-Pre   Date Value Ref Range Status   06/02/2021 11.97 L/sec      FEFMax-%Pred-Pre   Date Value Ref Range Status   06/02/2021 105 %      ExpTime-Pre   Date Value Ref Range Status   06/02/2021 6.90 sec      FIFMax-Pre   Date Value Ref Range Status   06/02/2021 9.58 L/sec      FEV1FEV6-Pred   Date Value Ref Range Status   06/02/2021 81 %      FEV1FEV6-Pre   Date Value Ref Range Status   06/02/2021 83 %      No results found for: \"20055\"  Imaging (personally reviewed in clinic today): None      Assessment and Plan:  Moderate Persistent Asthma  ACT score is today is 21 while on low dose Breo and his symptoms continued to be well controlled. We discussed trigger avoidance and an asthma action plan was discussed in clinic today.  Tito was educated on the fact that obesity, GERD and LLOYD can make asthma control more difficult and he knows the importance of weight loss, GERD control (now on Prilosec daily) and compliance with his CPAP for LLOYD control. He has lost considerable weight following weight loss medications.   My Asthma Home Management Plan of Care  Name: Joel Pineda   YOB: 1975  Date: 6/10/2024   My doctor: Ksenia Lyles   My clinic: [address] [address] [phone]   Control Medicine (dose,route freq.): Advair 250-50 one puff twice daily (Wixela)  Reliever/Rescue Medicine: Albuterol (Proair/Ventolin/Proventil HFA) 2-4 puffs EVERY 4 HOURS as needed. Use a spacer if recommended by your provider.  Oral Steroid Medicine: None My Asthma Severity: Mild Persistent  Avoid your asthma triggers: emotions and cold air        GREEN   ZONE   Good " Control  I feel good  No cough or wheeze  Can work, sleep and play without asthma symptoms       Take your asthma control medicine every day.    If exercise triggers your asthma, take Albuterol (Proair/Ventolin/Proventil HFA) 2-4 puffs EVERY 4 HOURS as needed. Use a spacer if recommended by your provider.  15 minutes before exercise or sports, and  During exercise if you have asthma symptoms  Spacer to use with inhaler: No             YELLOW   ZONE Getting Worse  I have ANY of these:  I do not feel good  Cough or wheeze  Chest feels tight  Wake up at night       Keep taking your Green Zone medications  Start taking your rescue medicine:  every 20 minutes for up to 1 hour. Then every 4 hours for 24-48 hours.  If you do not return to the Green Zone in 12-24 hours or you get worse, start taking your oral steroid medicine as prescribed by your provider.  If you stay in the Yellow Zone for more than 12-24 hours, contact your doctor.           RED ZONE Medical Alert - Get Help  I have ANY of these:  I feel awful  Medicine is not helping  Breathing getting harder  Trouble walking or talking  Nose opens wide to breathe       Take your rescue medicine NOW  If your provider has prescribed an oral steroid medicine, start taking it NOW  Call your doctor NOW  If you are still in the Red Zone after 20 minutes and you have not reached your doctor:  Take your rescue medicine again and  Call 911 or go to the emergency room right away    See your regular doctor within 7-10 days of an Emergency Room or Urgent Care visit for follow-up treatment.        Electronically signed by: Sue Gunter MD, Lupe 10, 2024  Annual Reminders:  Flu Shot in the Fall  Pharmacy: [id number] [id number] [address]      Asthma Triggers  How To Control Things That Make Your Asthma Worse    Triggers are things that make your asthma worse.  Look at the list below to help you find your triggers and  what you can do about them.  You  can help prevent asthma flare-ups by staying away from your triggers.      Trigger                                                          What you can do   Cigarette Smoke  Tobacco smoke can make asthma worse. Do not allow smoking in your home, car or around you.  Be sure no one smokes at a child s day care or school.  If you smoke, ask your health care provider for ways to help you quick.  Ask family members to quit too.  Ask your health care provider for a referral to Quit plan to help you quit smoking, or call 6-164-970-PLAN.     Colds, Flu, Bronchitis  These are common triggers of asthma. Wash your hands often.  Don t touch your eyes, nose or mouth.  Get a flu shot every year.     Dust Mites  These are tiny bugs that live in cloth or carpet. They are too small to see. Wash sheets and blankets in hot water every week.   Encase pillows and mattress in dust mite proof covers.  Avoid having carpet if you can. If you have carpet, vacuum weekly.   Use a dust mask and HEPA vacuum.   Pollen and Outdoor Mold  Some people are allergic to trees, grass, or weed pollen, or molds. Try to keep your windows closed.  Limit time out doors when pollen count is high.   Ask you health care provider about taking medicine during allergy season.     Animal Dander  Some people are allergic to skin flakes, urine or saliva from pets with fur or feathers. Keep pets with fur or feathers out of your home.    If you can t keep the pet outdoors, then keep the pet out of your bedroom.  Keep the bedroom door closed.  Keep pets off cloth furniture and away from stuffed toys.     Mice, Rats, and Cockroaches  Some people are allergic to the waste from these pets.   Cover food and garbage.  Clean up spills and food crumbs.  Store grease in the refrigerator.   Keep food out of the bedroom.   Indoor Mold  This can be a trigger if your home has high moisture Fix leaking faucets, pipes, or other sources of water.   Clean moldy surfaces.  Dehumidify  basement if it is damp and smelly.   Smoke, Strong Odors, and Sprays  These can reduce air quality. Stay away from strong odors and sprays, such as perfume, powder, hair spray, paints, smoke incense, paints, cleaning products, candles and new carpet.   Exercise or Sports  Some people with asthma have this trigger. Be active!  Ask you doctor about taking medicine before sports or exercise to prevent symptoms.    Warm up for 5-10 minutes before and after sports or exercise.     Other Triggers of Asthma  Cold air:  Cover your nose and mouth with a scarf.  Sometimes laughing or crying can be a trigger.  Some medicines and food can trigger asthma.        Voice hoarseness  Following with ENT     Obesity/LLOYD  He has lost considerable weight since starting Ozempic after referral to the weight management clinic.     Ankylosing spondylosis on Enbrel  No evidence of lung restriction of PFTs      Questions and concerns were answered to the patient's satisfaction.  he was provided with my contact information should new questions or concerns arise in the interim.  He should return to clinic in 12 months  He is up to date on a seasonal influenza vaccine, Prevnar (2015) and Pneumovax (2016).    I spent 40 minutes on the date of the encounter doing chart review, history and exam, documentation and further coordination as noted above exclusive of time interpreting PFT, Chest Xray, CT Chest.      Sue Gunter MD  Pulmonary, Critical Care and Sleep Medicine  [facility]  Pager: [phone]    The above note was dictated using voice recognition software and may include typographical errors. Please contact the author for any clarifications.

## 2025-07-02 NOTE — PROGRESS NOTES
Medication Therapy Management (MTM) Encounter    ASSESSMENT:                            Medication Adherence/Access: No issues identified.    Diabetes: Since patient has been able to tolerate current Mounjaro dose over the past 4 months, recommend dose increase for additional benefits of weight loss and continued blood sugar control. Recommend patient monitor GI symptoms closely with dose increase and reach out with concerns. Recommend patient start monitoring blood pressures at home to see if lower blood pressures could be causing dizziness symptoms before adjusting ramipril dose given normal blood pressure reading during clinic visit today. Suspect that dizziness may be related to POTS thus recommend patient follow recommendations made recently by POTS neurologist, recent CMP results show stable creatinine levels and patient confirms hydration thus don't suspect dehydration as a cause.     PLAN:                            Start monitoring your blood pressure at home, recording the date, time, and reading, and then will review at next visit to see if ramipril dose needs to be adjusted     Increase Mounjaro to 7.5mg weekly starting 7/17, monitor for worsening in GI side effects  and dizziness with dose change and reach out with issues     Follow-up: 8/7/25 at 12PM    SUBJECTIVE/OBJECTIVE:                          Tito Pineda is a 50 year old male called for a follow-up visit.       Reason for visit: Mounjaro follow up.    Allergies/ADRs: Reviewed in chart  Past Medical History: Reviewed in chart  Tobacco: He reports that he has never smoked. He has never been exposed to tobacco smoke. He has never used smokeless tobacco.  Alcohol: Reviewed in chart     Medication Adherence/Access: no issues reported.    Diabetes   Patient diagnosed with type 2 diabetes   Current Medications:  Mounjaro 5mg weekly on Thursdays. Patient continues to have some nausea and constipation symptoms.  Has been using Miralax and Doculax as  needed and patient notes that this has been effective to manage symptoms. Not sure if nausea is related to gastroparesis, continues to use metoclopramide as needed usually 2 times weekly and patient reports that this is his baseline use. Patient notes that dizziness continues to occur, message sent to Dr. Lyles about ramipril dose reduction, have not heard back yet. He notes that the dizziness continues to occur most days, thinks that it could be related to dehydration potentially but has stayed well hydrated. Notes that dizziness is usually associated with laying down to standing. Patient note that they do not see a connection between dizziness worsening and being on Mounjaro. Did have a follow up with POTS neurologist last week and was recommended the following:   Plan:  - Repeat autonomic testing in a few months  - Physical therapy referral to discuss exercises for POTS   Medication History:  - Ozempic: rash occurring on face with higher doses, replaced by Mounjaro     Today's Vitals: There were no vitals taken for this visit.  ----------------    I spent 20 minutes with this patient today. All changes were made via collaborative practice agreement with Abbie Cuenca.     A summary of these recommendations was sent via [employer].    Traci Méndez), PharmD  Endocrine & Diabetes Medication Therapy Management Pharmacist   [address] [place] [id number]     Contact information:   To schedule a MTM appointment: [phone]  For questions or concerns, please send a [employer] message or call the clinic at [phone].    For more urgent concerns that do not require [phone], please call [phone] after hours/weekends and ask to speak with the Endocrinologist on call.      Telemedicine Visit Details  The patient's medications can be safely assessed via a telemedicine encounter.  Type of service:  Telephone visit  Originating Location (pt. Location): Home    Distant Location (provider location):   Off-site  Start Time: 2:05 PM  End Time: 2:25 PM     Medication Therapy Recommendations  Type 2 diabetes mellitus with complication, without long-term current use of insulin (H)   1 Current Medication: tirzepatide (MOUNJARO) 5 MG/0.5ML SOAJ auto-injector pen (Discontinued)   Current Medication Sig: Inject 0.5 mLs (5 mg) subcutaneously every 7 days.   Rationale: Dose too low - Dosage too low - Effectiveness   Recommendation: Increase Dose - Mounjaro 7.5 MG/0.5ML Soaj   Status: Accepted per CPA   Identified Date: 7/2/2025 Completed Date: 7/2/2025

## 2025-07-02 NOTE — NURSING NOTE
Joel Pineda's goals for this visit include:   Chief Complaint   Patient presents with    Follow Up    Asthma       He requests these members of his care team be copied on today's visit information: no     PCP: Ksenia Lyles    Referring Provider:  Sue Gunter MD  [address] [place] [id number]    /87 (BP Location: Left arm, Patient Position: Chair, Cuff Size: Adult Large)   Pulse 96   Wt 132.9 kg (293 lb 1.6 oz)   SpO2 96%   BMI 35.68 kg/m      Do you need any medication refills at today's visit? No     CONCHITA Khan   Neph/Pulm [facility]

## 2025-07-02 NOTE — PATIENT INSTRUCTIONS
"Recommendations from today's MTM visit:                                                      Start monitoring your blood pressure at home, recording the date, time, and reading, and then will review at next visit to see if ramipril dose needs to be adjusted     Increase Mounjaro to 7.5mg weekly starting 7/17, monitor for worsening in GI side effects  and dizziness with dose change and reach out with issues     Follow-up: 8/7/25 at 12PM    It was great speaking with you today.  I value your experience and would be very thankful for your time in providing feedback in our clinic survey. In the next few days, you may receive an email or text message from [employer] with a link to a survey related to your  clinical pharmacist.\"     To schedule another MTM appointment, please call the clinic directly or you may call the MTM scheduling line at [phone].    My Clinical Pharmacist's contact information:                                                      Please feel free to contact me with any questions or concerns you have.      Traci Ballard ([place]), PharmD  Endocrine & Diabetes Medication Therapy Management Pharmacist   [address] [place] [id number]     Contact information:   To schedule a MTM appointment: [phone]  For questions or concerns, please send a [employer] message or call the clinic at [phone].    For more urgent concerns that do not require 202, please call [phone] after hours/weekends and ask to speak with the Endocrinologist on call.     "

## 2025-07-07 ENCOUNTER — OFFICE VISIT (OUTPATIENT)
Dept: ENDOCRINOLOGY | Facility: CLINIC | Age: 50
End: 2025-07-07
Payer: MEDICARE

## 2025-07-07 VITALS
OXYGEN SATURATION: 97 % | HEART RATE: 83 BPM | DIASTOLIC BLOOD PRESSURE: 76 MMHG | WEIGHT: 293 LBS | SYSTOLIC BLOOD PRESSURE: 111 MMHG | BODY MASS INDEX: 35.67 KG/M2

## 2025-07-07 DIAGNOSIS — E11.8 TYPE 2 DIABETES MELLITUS WITH COMPLICATION, WITHOUT LONG-TERM CURRENT USE OF INSULIN (H): Primary | ICD-10-CM

## 2025-07-07 DIAGNOSIS — E03.9 ACQUIRED HYPOTHYROIDISM: ICD-10-CM

## 2025-07-07 LAB
EST. AVERAGE GLUCOSE BLD GHB EST-MCNC: 111 MG/DL
HBA1C MFR BLD: 5.5 %

## 2025-07-07 PROCEDURE — 83036 HEMOGLOBIN GLYCOSYLATED A1C: CPT | Performed by: INTERNAL MEDICINE

## 2025-07-07 PROCEDURE — 99214 OFFICE O/P EST MOD 30 MIN: CPT | Performed by: INTERNAL MEDICINE

## 2025-07-07 PROCEDURE — G2211 COMPLEX E/M VISIT ADD ON: HCPCS | Performed by: INTERNAL MEDICINE

## 2025-07-07 PROCEDURE — 3078F DIAST BP <80 MM HG: CPT | Performed by: INTERNAL MEDICINE

## 2025-07-07 PROCEDURE — 3074F SYST BP LT 130 MM HG: CPT | Performed by: INTERNAL MEDICINE

## 2025-07-07 RX ORDER — LEVOTHYROXINE SODIUM 75 UG/1
75 TABLET ORAL EVERY MORNING
Qty: 90 TABLET | Refills: 0 | Status: SHIPPED | OUTPATIENT
Start: 2025-07-07

## 2025-07-07 RX ORDER — RAMIPRIL 5 MG/1
5 CAPSULE ORAL DAILY
Qty: 90 CAPSULE | Refills: 3 | Status: SHIPPED | OUTPATIENT
Start: 2025-07-07

## 2025-07-07 NOTE — PATIENT INSTRUCTIONS
Ramipril 10 -> 5 mg daily    Continue Levothyroxine 75 mcg daiy    Increase Mounjaro to 7.5 mg weekly as planned

## 2025-07-07 NOTE — PROGRESS NOTES
[facility]       Follow up for hypothyroidism, obesity, low testosterone and T2DM      Assessment:  50 y old man with morbid obesity, type 2 diabetes, hypothyroidism and co-morbidity of dyslipidemia POTS chronic pain syndrome on chronic pain medications, bipolar disorder, migraines    Obesity patient had been on Mounjaro 5 mg weekly, about to increase ot 7.5 mg , still with some craving on 5 mg, tolerates this better than Ozempic  He has lost 35 pounds since summer 2022 , plateau    Low testosterone resolved with weight loss     Hypothyroidism doing well on levothyroxine 75 mcg daily, previously well replaced, recheck in 9/25    Lower sitting BP with weight loss, no history of albminuria, decrease Ramipril     Plan:   Continue levothyroxine 75 mcg daily  Ramipril 10 -> 5 mg daily    Increase Mounjaro to 7.5 mg weekly as planned     The Longitudinal plan of care for obesity, hypothyroidism, and T2DM was addressed during this visit. Due to added complexity of care, we will continue to support Joel , and the subsequent management of this condition(s) and with the ongoing continuity of care of this condition.      Abbie Cuenca MD  Endocrinology and Diabetes  Telephone contact:  [facility] [place] [phone]  [facility] [phone]        Interval history  Pt stopped Reglan due to elevated Prolactin in 9/23;    Has been on Mounjaro 5 mg, tolerates this better than Ozempic, however alejandrina nause    Has been on LT4 75 mcg. Takes this as directed      Testosterone:   Low libido decrease in sex drive, years  Erectile dysfunction no med  Has never been on testosterone before  Sleep: snoring CPAP, testosterone levels in nl range after weight loss    Diabetes:  Has been in remission since weight loss on Ozempic  Prior medications  Intolerant to metformin due to GI side effects  Intolerance to sulfonylureas due to hypoglycemia    Current Problem  List:   Patient Active Problem List   Diagnosis    Intermittent asthma    Chronic nonallergic rhinitis    Diverticulosis    GERD (gastroesophageal reflux disease)    Generalized anxiety disorder    LLOYD (obstructive sleep apnea)- mild (AHI 11)    Intracranial arachnoid cyst    Facet arthritis of cervical region    Acquired hypothyroidism    Chronic midline low back pain without sciatica    Irritable bowel syndrome with diarrhea    B12 deficiency    Hypertension goal BP (blood pressure) < 140/90    Chronic, continuous use of opioids    Ankylosing spondylitis of sacral region (H)    Morbid obesity (H)    Fatty infiltration of liver    DDD (degenerative disc disease), lumbar    Type 2 diabetes mellitus with complication, without long-term current use of insulin (H)    Peripheral polyneuropathy    History of pulmonary embolism    Ingrown toenail    Hypertriglyceridemia    Gastroparesis    Orthostatic dizziness    POTS (postural orthostatic tachycardia syndrome)    PHN (postherpetic neuralgia)    Dysautonomia (H)    Chronic pain syndrome    Borderline personality disorder (H)    Major depressive disorder, recurrent episode, mild    Folliculitis    Immunosuppression    Small fiber neuropathy    RBD (REM behavioral disorder)    Hyperlipidemia LDL goal <70    Anal fissure    Anorectal disorder    Benign neoplasm of ascending colon    Benign neoplasm of cecum    Altered bowel function    Digestive symptom    Dysphagia    Generalized abdominal pain    History of colitis    Internal hemorrhoids    Nausea    Perianal abscess    Hemorrhage of rectum and anus    Therapeutic opioid induced constipation    Diabetic polyneuropathy associated with diabetes mellitus due to underlying condition (H)    Lumbar radiculopathy    Glottic insufficiency    Vocal fold paralysis, unilateral       Past Medical and Past Surgical History:  Past Medical History:   Diagnosis Date    Acne     Acquired hypothyroidism     Allergic state     Ankylosing  spondylitis lumbar region (H)     Ankylosing spondylitis of sacral region (H)     Anxiety     Bipolar 2 disorder (H)     Chest pain     Chest pain, regulated w/BP meds. Clear arteries.    Chronic pain     Chronic, continuous use of opioids     DDD (degenerative disc disease), lumbar     Depressive disorder     Diabetes (H)     Diverticulosis     Dysautonomia (H)     Facet arthritis of cervical region     Gastroesophageal reflux disease     Hypertension     IBS (irritable bowel syndrome)     Intracranial arachnoid cyst     Lumbar radiculopathy     Major depressive disorder, recurrent episode     Multiple psych providers - they manage meds August 2015: Provigil induced severe mood dis-function     Major depressive disorder, recurrent episode, severe (H) 12/02/2020    MDD (major depressive disorder), recurrent severe, without psychosis (H) 07/21/2021    Mixed dyslipidemia 04/06/2023    Obese     LLOYD (obstructive sleep apnea)- mild (AHI 11)     Polyneuropathy     POTS (postural orthostatic tachycardia syndrome)     Pulmonary embolism (H)     Skin exam, screening for cancer 12/03/2013    Sleep apnea     Thrombosis     Uncomplicated asthma        Past Surgical History:   Procedure Laterality Date    BACK SURGERY  10/2007    lumbar discectomy L5-S1    BIOPSY  09/15/2020    Colon Adenomas x2    COLONOSCOPY      Note: colonoscopy scheduled with [facility] on Friday, 9/4/15    COSMETIC SURGERY  2012    Nose Exterior - functional    GI SURGERY  08/2013    Sigmoidectomy    HERNIA REPAIR, UMBILICAL  08/23/2011    [facility]Dr. Evan    INCISION AND DRAINAGE, ABSCESS, COMPLEX  08/23/2011    umbilical, Dr. Evan Beavers    LAMINECTOMY LUMBAR TWO LEVELS Bilateral 9/25/2024    Procedure: bilateral lumbar 4-lumbar 5 hemilaminectomies, medial facetectomies and foraminotomies with left lumbar microdiscectomy and lumbar 5-sacral 1 foraminotomies;  Surgeon: Anitha Biggs MD;  Location: [facility] OR    LAPAROSCOPIC ASSISTED  COLECTOMY LEFT (DESCENDING)  08/15/2013    Procedure: LAPAROSCOPIC ASSISTED COLECTOMY LEFT (DESCENDING);  Laparoscopic Hand Assisted Sigmoid Resection, Mobilization of Splenic Fissure, coloproctoscopy, *Latex Free Room* Anesthesia General with Pain block  ;  Surgeon: Aurora Justice MD;  Location: [facility] OR    NERVE SURGERY  08/18/2011    RF ablation @ L3-S1 @ [facility]    RECONSTRUCT NOSE AND SEPTUM (FUNCTIONAL)  10/14/2011    Procedure:RECONSTRUCT NOSE AND SEPTUM (FUNCTIONAL); Functional Septorhinoplasty, Turbinate Reduction, ; Surgeon:CEDRIC CUEVAS; Location:[facility] OR    SINUS SURGERY  10/01/2001    ethmoidectomy chronic sinusitis    SOFT TISSUE SURGERY  4/7/2022    Hidradenitis Suppurativa surgery       Medications:   Current Outpatient Medications   Medication Sig Dispense Refill    acetaminophen 500 MG CAPS Take 500 mg by mouth every morning.      albuterol (PROAIR HFA/PROVENTIL HFA/VENTOLIN HFA) 108 (90 Base) MCG/ACT inhaler Inhale 2 puffs into the lungs every 6 hours as needed for shortness of breath, wheezing or cough. 90 day supply 25.5 g 3    bisacodyl (DULCOLAX) 5 MG EC tablet Take 10 mg by mouth as needed for constipation      buPROPion (WELLBUTRIN XL) 300 MG 24 hr tablet Take 1 tablet by mouth daily      cetirizine (ZYRTEC) 5 MG tablet Take 5 mg by mouth daily.      cholecalciferol (D3-50) 1250 mcg (77066 units) capsule TAKE ONE CAPSULE BY MOUTH EVERY 2 WEEKS 24 capsule 0    clonazePAM (KLONOPIN) 0.5 MG tablet Take 1 mg by mouth at bedtime. And 0.5mg daily as needed. Uses at bedtime for RBD      cyanocobalamin (CYANOCOBALAMIN) 1000 mcg/mL injection INJECT 1ML INTO THE MUSCLE EVERY 30 DAYS 3 mL 2    cyclobenzaprine (FLEXERIL) 5 MG tablet Take 1 tablet (5 mg) by mouth 3 times daily as needed for muscle spasms. 90 tablet 1    diphenhydrAMINE (BENADRYL) 25 MG capsule Take 25 mg by mouth once a week Before Enbrel injection      divalproex sodium delayed-release (DEPAKOTE) 125 MG DR tablet Take 250 mg by mouth 2  times daily.      DULoxetine (CYMBALTA) 60 MG capsule Take 120 mg by mouth daily       ENBREL SURECLICK 50 MG/ML autoinjector INJECT THE CONTENTS OF ONE AUTOINJECTOR PEN UNDER THE SKIN EVERY 7 DAYS. 4 mL 5    EPINEPHrine (ANY BX GENERIC EQUIV) 0.3 MG/0.3ML injection 2-pack Inject 0.3 mLs (0.3 mg) into the muscle as needed for anaphylaxis May repeat one time in 5-15 minutes if response to initial dose is inadequate. 2 each 3    eszopiclone (LUNESTA) 3 MG tablet Take 3 mg by mouth At Bedtime       famotidine (PEPCID) 20 MG tablet Take 1 tablet (20 mg) by mouth daily. 90 tablet 1    fenofibrate (TRICOR) 48 MG tablet TAKE ONE TABLET BY MOUTH ONCE DAILY 90 tablet 3    fluticasone (FLONASE) 50 MCG/ACT nasal spray Spray 2 sprays in nostril every evening. 48 g 2    fluticasone-vilanterol (BREO ELLIPTA) 100-25 MCG/ACT inhaler Inhale 1 puff into the lungs daily. 3 each 3    galcanezumab-gnlm (EMGALITY) 120 MG/ML injection Inject 120 mg subcutaneously every 28 days.      ketotifen fumarate 0.035% 0.035 % SOLN ophthalmic solution Place 1 drop into both eyes daily as needed for itching.      levothyroxine (SYNTHROID/LEVOTHROID) 75 MCG tablet TAKE ONE TABLET BY MOUTH EVERY MORNING 90 tablet 0    lidocaine (XYLOCAINE) 5 % external ointment Apply topically 2 times daily as needed for moderate pain. 50 g 5    lidocaine, viscous, (XYLOCAINE) 2 % solution Swish and spit 15 mLs in mouth every 3 hours as needed for moderate pain. ; Max 8 doses/24 hour period. 100 mL 1    lurasidone (LATUDA) 40 MG TABS tablet Take 40 mg by mouth daily with food.      MAGNESIUM GLYCINATE PO Take 480 mg by mouth daily.      Melatonin 10 MG TABS tablet Take 10 mg by mouth at bedtime      metoclopramide (REGLAN) 5 MG tablet Take 5 mg by mouth 4 times daily as needed.      metoprolol succinate ER (TOPROL XL) 100 MG 24 hr tablet TAKE 1 TABLET BY MOUTH IN THE EVENING; TO BE USED WITH 200MG IN MORNING 90 tablet 2    metoprolol succinate ER (TOPROL XL) 200 MG 24  "hr tablet Take 1 tablet (200 mg) by mouth daily. 90 tablet 1    montelukast (SINGULAIR) 10 MG tablet TAKE ONE TABLET BY MOUTH EVERY EVENING 90 tablet 3    multivitamin w/minerals (MULTI-VITAMIN) tablet Take 1 tablet by mouth daily.      nabumetone (RELAFEN) 500 MG tablet TAKE 1 TO 2 TABLETS BY MOUTH TWICE DAILY AS NEEDED FOR MODERATE PAIN 120 tablet 0    naloxone (NARCAN) 4 MG/0.1ML nasal spray Spray 1 spray (4 mg) into one nostril alternating nostrils as needed for opioid reversal. every 2-3 minutes until assistance arrives. This medication is an antidote and stays on the chart as an \"as needed\" medication 2 each 2    omega-3 acid ethyl esters (LOVAZA) 1 g capsule TAKE TWO CAPSULES BY MOUTH TWICE DAILY 360 capsule 2    omeprazole (PRILOSEC) 20 MG DR capsule Take 1 capsule (20 mg) by mouth daily. 90 capsule 2    oxyCODONE (ROXICODONE) 5 MG tablet Take 1 tablet (5 mg) by mouth every 6 hours as needed for breakthrough pain. Fill 06/28/25 to start 06/30/25. 30 days supply for chronic pain 60 tablet 0    pregabalin (LYRICA) 150 MG capsule TAKE ONE CAPSULE BY MOUTH THREE TIMES DAILY 270 capsule 0    pseudoePHEDrine-guaiFENesin (MUCINEX D)  MG TB12 Take 1 tablet by mouth 2 times daily as needed (congestion).      pyridostigmine (MESTINON) 60 MG tablet Take 1 tablet by mouth 3 times daily      ramipril (ALTACE) 10 MG capsule TAKE ONE CAPSULE BY MOUTH ONCE DAILY 90 capsule 2    Riboflavin 400 MG TABS Take 400 mg by mouth daily.      rosuvastatin (CRESTOR) 40 MG tablet TAKE ONE TABLET BY MOUTH ONCE DAILY 90 tablet 3    sodium fluoride 1.1 % CREA At Bedtime      syringe/needle, disp, (B-D LUER-LUCILLE SYRINGE) 25G X 1\" 3 ML MISC USE WITH B12 INJECTIONS 12 each 3    Tirzepatide (MOUNJARO) 7.5 MG/0.5ML SOAJ auto-injector pen Inject 0.5 mLs (7.5 mg) subcutaneously every 7 days. 2 mL 3    UBRELVY 100 MG tablet Take 100 mg by mouth at onset of headache      valACYclovir (VALTREX) 500 MG tablet Take 1 tablet (500 mg) by mouth " daily. (Patient taking differently: Take 500 mg by mouth daily. Taking 1000 mg three times daily by mouth) 90 tablet 2    vitamin B complex with vitamin C (STRESS TAB) tablet Take 1 tablet by mouth daily      ZINC SULFATE-VITAMIN C MT Take 1 tablet by mouth daily         Allergies:   Allergies   Allergen Reactions    Amoxicillin-Pot Clavulanate Difficulty breathing    Banana Shortness Of Breath     Pt reports organic Banana is okay.     Clavulanic Acid Anaphylaxis     Other Reaction(s): Throat swelling    Nitroglycerin Palpitations    Penicillins Anaphylaxis    Provigil [Modafinil] Shortness Of Breath     headache    Gadolinium Hives and Itching     Patient was premedicated for the contrast allergy. He did still have a reaction a few hours after injection. Hives and itching. Dr. Gomez told tech to inform pt he should only have contrast again in the future when premedicated and at a hospital. Not at an outpatient facility.     Haemophilus B Polysaccharide Vaccine Rash     Quadrivalent, cell based    Influenza Virus Vaccine Rash     Quadrivalent, cell based    Ketoconazole      Topical cream caused swelling and itching    Dye [Contrast Dye] Other (See Comments) and Hives     Moderate flushing, CT contrast  Iodinated and gadolinium    Formoterol     Gabapentin      Other reaction(s): hives    Golimumab      Hives, bradycardia, face swelling    Mometasone     Naproxen      Other reaction(s): Bleeding Gums    Neurontin [Gabapentin] Hives     Moderate hives    Nortriptyline Hives    Nystatin Hives    Varicella Virus Vaccine Live      Rash    Adhesive Tape Rash    Baclofen Other (See Comments)     Cognitive changes    Cefdinir Blisters, Dermatitis, Difficulty breathing, Other (See Comments) and Rash    Flagyl [Metronidazole Hcl] Palpitations and Hives    Latex Rash    Metronidazole Palpitations, Other (See Comments) and Rash     dizziness (versus ciprofloxacin taken at same time)    Sulfamethizole Rash     Other  "Reaction(s): Rash       Social History     Tobacco Use    Smoking status: Never     Passive exposure: Never    Smokeless tobacco: Never   Substance Use Topics    Alcohol use: Not Currently     Comment: occ 1/week       Family History   Problem Relation Age of Onset    Musculoskeletal Disorder Mother         back    Anxiety Disorder Mother     Colon Polyps Mother     Ulcerative Colitis Mother         and ischemic small intestine, surgery    Hypertension Mother     Breast Cancer Mother     Osteoporosis Mother     Diabetes Mother         Type 2, Diagnosed in 2014    Depression Mother         Takes Cymbalta to help with chronic pain + depx    Thyroid Disease Mother         Hypothyroidism    Obesity Mother         Under much better control latter half of 2015    Cataracts Mother     Musculoskeletal Disorder Father         back    Substance Abuse Father         Alcohol    Hypertension Father     Hyperlipidemia Father     Depression Father         Off meds for many years. Seems \"ok\"    Anxiety Disorder Father     Heart Disease Maternal Grandmother     Heart Disease Maternal Grandfather     Psychotic Disorder Paternal Grandfather     Suicide Paternal Grandfather     Depression Paternal Grandfather         Pediatrician. Committed suicide by pistol in 1990.    Musculoskeletal Disorder Brother         back    Depression Brother         Expressed as anger and moodiness    Substance Abuse Brother         Alcohol    Anxiety Disorder Brother     Pulmonary Embolism Brother     Substance Abuse Sister         Alcohol    Depression Sister         Mental Health Therapist, yet no anti-depressants?    Anxiety Disorder Sister         Mental Health Therapist, yet no anti-anxiety meds?    Deep Vein Thrombosis (DVT) Sister         acssociated with OCP use    Bladder Cancer Other     Colon Cancer No family hx of     Crohn's Disease No family hx of     Anesthesia Reaction No family hx of     Skin Cancer No family hx of     Bleeding Disorder No " family hx of     Glaucoma No family hx of     Macular Degeneration No family hx of        Physical Examination:  /76 (BP Location: Left arm, Patient Position: Sitting, Cuff Size: Adult Large)   Pulse 83   Wt 132.9 kg (293 lb)   SpO2 97%   BMI 35.67 kg/m      Wt Readings from Last 4 Encounters:   07/02/25 132.9 kg (293 lb 1.6 oz)   06/05/25 130.6 kg (288 lb)   05/15/25 130.6 kg (288 lb)   04/22/25 130.1 kg (286 lb 14.4 oz)     10/19/22 : 140.6 kg (310 lb)  08/10/22 : 142.9 kg (315 lb)  07/11/22 : 143.8 kg (317 lb)  06/08/22 : 144.6 kg (318 lb 12.8 oz)    General: Well appearing tall obese man  in no distress,  Eyes: EOMI. Sclerae and conjunctivae are clear.      Labs and Studies:   Recent Labs   Lab Test 09/16/24  0856 06/24/24  1209 12/12/23  0823 02/15/23  0833 08/03/22  0947   FSH  --   --  7.1  --  6.9   TSH 2.64 3.36 1.69   < >  --     < > = values in this interval not displayed.       Lab Results   Component Value Date    ALT 21 05/28/2025    AST 27 05/28/2025    BILITOTAL 0.2 05/28/2025    CR 1.15 05/28/2025     05/28/2025    TSH 2.64 09/16/2024    ALKPHOS 67 05/28/2025    TESTOSTTOTAL 299 12/12/2023    TESTOSTTOTAL 194 (L) 08/03/2022    TESTOSTTOTAL 203 (L) 07/25/2022    TESTOSTTOTAL 354 03/19/2013    SHBGT 23 12/12/2023    SHBGT 17 08/03/2022    SHBGT 15 07/25/2022    FT 7.14 12/12/2023    FT 5.12 08/03/2022    FT 5.64 07/25/2022    PSA 0.09 01/29/2025    PSA 0.13 12/12/2023    PSA 0.13 12/12/2023    TRIG 359 (H) 01/15/2025    TRIG 305 (H) 12/12/2023    TRIG 277 (H) 02/15/2023    LDL 49 01/15/2025    HDL 34 (L) 01/15/2025    HDL 33 (L) 12/12/2023    HDL 34 (L) 02/15/2023    PROLACTIN 25 (H) 01/19/2012       Lab Results   Component Value Date     05/28/2025    CO2 27 05/28/2025    CHLORIDE 104 05/28/2025    CR 1.15 05/28/2025    TRIG 359 (H) 01/15/2025    HDL 34 (L) 01/15/2025    HGB 14.9 05/28/2025     Lab Results   Component Value Date    ALT 21 05/28/2025    AST 27 05/28/2025     BILITOTAL 0.2 05/28/2025    CR 1.15 05/28/2025     05/28/2025    TSH 2.64 09/16/2024    ALKPHOS 67 05/28/2025    TESTOSTTOTAL 299 12/12/2023    TESTOSTTOTAL 194 (L) 08/03/2022    TESTOSTTOTAL 203 (L) 07/25/2022    TESTOSTTOTAL 354 03/19/2013    SHBGT 23 12/12/2023    SHBGT 17 08/03/2022    SHBGT 15 07/25/2022    FT 7.14 12/12/2023    FT 5.12 08/03/2022    FT 5.64 07/25/2022    PSA 0.09 01/29/2025    PSA 0.13 12/12/2023    PSA 0.13 12/12/2023    TRIG 359 (H) 01/15/2025    TRIG 305 (H) 12/12/2023    TRIG 277 (H) 02/15/2023    LDL 49 01/15/2025    HDL 34 (L) 01/15/2025    HDL 33 (L) 12/12/2023    HDL 34 (L) 02/15/2023    PROLACTIN 25 (H) 01/19/2012     Lab Results   Component Value Date     05/28/2025    CHLORIDE 104 05/28/2025    CO2 27 05/28/2025    GLC 96 05/28/2025    CR 1.15 05/28/2025    CR 1.17 04/01/2025    CR 1.02 12/31/2024    CR 1.11 10/30/2024    CR 0.88 09/26/2024    ALYCE 9.7 05/28/2025    MAG 2.1 08/19/2013    ALBUMIN 4.5 05/28/2025    ALKPHOS 67 05/28/2025    LDL 49 01/15/2025    HDL 34 (L) 01/15/2025    TRIG 359 (H) 01/15/2025    TSH 2.64 09/16/2024    TSH 3.36 06/24/2024    TSH 1.69 12/12/2023     Lab Results   Component Value Date    MICROL 17.6 05/28/2025    MICROL 14.4 04/13/2024    MICROL 73.0 12/12/2023    MICROL 9 09/23/2022    MICROL 8 11/10/2021     Lab Results   Component Value Date    A1C 5.8 (H) 01/15/2025    A1C 5.5 06/24/2024    A1C 5.7 (H) 02/15/2023    A1C 6.4 (H) 09/23/2022    A1C 6.3 (H) 02/10/2022       Lab Results   Component Value Date    HGB 14.9 05/28/2025

## 2025-07-07 NOTE — LETTER
7/7/2025      Joel Pineda  [address] [place] [address]      Dear Colleague,    Thank you for referring your patient, Joel Pineda, to the [facility]. Please see a copy of my visit note below.        [facility]       Follow up for hypothyroidism, obesity, low testosterone and T2DM      Assessment:  50 y old man with morbid obesity, type 2 diabetes, hypothyroidism and co-morbidity of dyslipidemia POTS chronic pain syndrome on chronic pain medications, bipolar disorder, migraines    Obesity patient had been on Mounjaro 5 mg weekly, about to increase ot 7.5 mg , still with some craving on 5 mg, tolerates this better than Ozempic  He has lost 35 pounds since summer 2022 , plateau    Low testosterone resolved with weight loss     Hypothyroidism doing well on levothyroxine 75 mcg daily, previously well replaced, recheck in 9/25    Lower sitting BP with weight loss, no history of albminuria, decrease Ramipril     Plan:   Continue levothyroxine 75 mcg daily  Ramipril 10 -> 5 mg daily    Increase Mounjaro to 7.5 mg weekly as planned     The Longitudinal plan of care for obesity, hypothyroidism, and T2DM was addressed during this visit. Due to added complexity of care, we will continue to support Joel , and the subsequent management of this condition(s) and with the ongoing continuity of care of this condition.      Abbie Cuenca MD  Endocrinology and Diabetes  Telephone contact:  [facility] [place] [phone]  [facility] [phone]        Interval history  Pt stopped Reglan due to elevated Prolactin in 9/23;    Has been on Mounjaro 5 mg, tolerates this better than Ozempic, however alejandrina nause    Has been on LT4 75 mcg. Takes this as directed      Testosterone:   Low libido decrease in sex drive, years  Erectile dysfunction no med  Has never been on testosterone before  Sleep: snoring CPAP, testosterone  levels in nl range after weight loss    Diabetes:  Has been in remission since weight loss on Ozempic  Prior medications  Intolerant to metformin due to GI side effects  Intolerance to sulfonylureas due to hypoglycemia    Current Problem List:   Patient Active Problem List   Diagnosis     Intermittent asthma     Chronic nonallergic rhinitis     Diverticulosis     GERD (gastroesophageal reflux disease)     Generalized anxiety disorder     LLOYD (obstructive sleep apnea)- mild (AHI 11)     Intracranial arachnoid cyst     Facet arthritis of cervical region     Acquired hypothyroidism     Chronic midline low back pain without sciatica     Irritable bowel syndrome with diarrhea     B12 deficiency     Hypertension goal BP (blood pressure) < 140/90     Chronic, continuous use of opioids     Ankylosing spondylitis of sacral region (H)     Morbid obesity (H)     Fatty infiltration of liver     DDD (degenerative disc disease), lumbar     Type 2 diabetes mellitus with complication, without long-term current use of insulin (H)     Peripheral polyneuropathy     History of pulmonary embolism     Ingrown toenail     Hypertriglyceridemia     Gastroparesis     Orthostatic dizziness     POTS (postural orthostatic tachycardia syndrome)     PHN (postherpetic neuralgia)     Dysautonomia (H)     Chronic pain syndrome     Borderline personality disorder (H)     Major depressive disorder, recurrent episode, mild     Folliculitis     Immunosuppression     Small fiber neuropathy     RBD (REM behavioral disorder)     Hyperlipidemia LDL goal <70     Anal fissure     Anorectal disorder     Benign neoplasm of ascending colon     Benign neoplasm of cecum     Altered bowel function     Digestive symptom     Dysphagia     Generalized abdominal pain     History of colitis     Internal hemorrhoids     Nausea     Perianal abscess     Hemorrhage of rectum and anus     Therapeutic opioid induced constipation     Diabetic polyneuropathy associated with  diabetes mellitus due to underlying condition (H)     Lumbar radiculopathy     Glottic insufficiency     Vocal fold paralysis, unilateral       Past Medical and Past Surgical History:  Past Medical History:   Diagnosis Date     Acne      Acquired hypothyroidism      Allergic state      Ankylosing spondylitis lumbar region (H)      Ankylosing spondylitis of sacral region (H)      Anxiety      Bipolar 2 disorder (H)      Chest pain     Chest pain, regulated w/BP meds. Clear arteries.     Chronic pain      Chronic, continuous use of opioids      DDD (degenerative disc disease), lumbar      Depressive disorder      Diabetes (H)      Diverticulosis      Dysautonomia (H)      Facet arthritis of cervical region      Gastroesophageal reflux disease      Hypertension      IBS (irritable bowel syndrome)      Intracranial arachnoid cyst      Lumbar radiculopathy      Major depressive disorder, recurrent episode     Multiple psych providers - they manage meds August 2015: Provigil induced severe mood dis-function      Major depressive disorder, recurrent episode, severe (H) 12/02/2020     MDD (major depressive disorder), recurrent severe, without psychosis (H) 07/21/2021     Mixed dyslipidemia 04/06/2023     Obese      LLOYD (obstructive sleep apnea)- mild (AHI 11)      Polyneuropathy      POTS (postural orthostatic tachycardia syndrome)      Pulmonary embolism (H)      Skin exam, screening for cancer 12/03/2013     Sleep apnea      Thrombosis      Uncomplicated asthma        Past Surgical History:   Procedure Laterality Date     BACK SURGERY  10/2007    lumbar discectomy L5-S1     BIOPSY  09/15/2020    Colon Adenomas x2     COLONOSCOPY      Note: colonoscopy scheduled with [facility] on Friday, 9/4/15     COSMETIC SURGERY  2012    Nose Exterior - functional     GI SURGERY  08/2013    Sigmoidectomy     HERNIA REPAIR, UMBILICAL  08/23/2011    Dr. Evan whiting     INCISION AND DRAINAGE, ABSCESS, COMPLEX  08/23/2011    umbilical,  Dr. Evan Beavers     LAMINECTOMY LUMBAR TWO LEVELS Bilateral 9/25/2024    Procedure: bilateral lumbar 4-lumbar 5 hemilaminectomies, medial facetectomies and foraminotomies with left lumbar microdiscectomy and lumbar 5-sacral 1 foraminotomies;  Surgeon: Anitha Biggs MD;  Location: [facility] OR     LAPAROSCOPIC ASSISTED COLECTOMY LEFT (DESCENDING)  08/15/2013    Procedure: LAPAROSCOPIC ASSISTED COLECTOMY LEFT (DESCENDING);  Laparoscopic Hand Assisted Sigmoid Resection, Mobilization of Splenic Fissure, coloproctoscopy, *Latex Free Room* Anesthesia General with Pain block  ;  Surgeon: Aurora Justice MD;  Location:  OR     NERVE SURGERY  08/18/2011    RF ablation @ L3-S1 @ [facility]     RECONSTRUCT NOSE AND SEPTUM (FUNCTIONAL)  10/14/2011    Procedure:RECONSTRUCT NOSE AND SEPTUM (FUNCTIONAL); Functional Septorhinoplasty, Turbinate Reduction, ; Surgeon:CEDRIC CUEVAS; Location: OR     SINUS SURGERY  10/01/2001    ethmoidectomy chronic sinusitis     SOFT TISSUE SURGERY  4/7/2022    Hidradenitis Suppurativa surgery       Medications:   Current Outpatient Medications   Medication Sig Dispense Refill     acetaminophen 500 MG CAPS Take 500 mg by mouth every morning.       albuterol (PROAIR HFA/PROVENTIL HFA/VENTOLIN HFA) 108 (90 Base) MCG/ACT inhaler Inhale 2 puffs into the lungs every 6 hours as needed for shortness of breath, wheezing or cough. 90 day supply 25.5 g 3     bisacodyl (DULCOLAX) 5 MG EC tablet Take 10 mg by mouth as needed for constipation       buPROPion (WELLBUTRIN XL) 300 MG 24 hr tablet Take 1 tablet by mouth daily       cetirizine (ZYRTEC) 5 MG tablet Take 5 mg by mouth daily.       cholecalciferol (D3-50) 1250 mcg (05523 units) capsule TAKE ONE CAPSULE BY MOUTH EVERY 2 WEEKS 24 capsule 0     clonazePAM (KLONOPIN) 0.5 MG tablet Take 1 mg by mouth at bedtime. And 0.5mg daily as needed. Uses at bedtime for RBD       cyanocobalamin (CYANOCOBALAMIN) 1000 mcg/mL injection INJECT 1ML INTO THE  MUSCLE EVERY 30 DAYS 3 mL 2     cyclobenzaprine (FLEXERIL) 5 MG tablet Take 1 tablet (5 mg) by mouth 3 times daily as needed for muscle spasms. 90 tablet 1     diphenhydrAMINE (BENADRYL) 25 MG capsule Take 25 mg by mouth once a week Before Enbrel injection       divalproex sodium delayed-release (DEPAKOTE) 125 MG DR tablet Take 250 mg by mouth 2 times daily.       DULoxetine (CYMBALTA) 60 MG capsule Take 120 mg by mouth daily        ENBREL SURECLICK 50 MG/ML autoinjector INJECT THE CONTENTS OF ONE AUTOINJECTOR PEN UNDER THE SKIN EVERY 7 DAYS. 4 mL 5     EPINEPHrine (ANY BX GENERIC EQUIV) 0.3 MG/0.3ML injection 2-pack Inject 0.3 mLs (0.3 mg) into the muscle as needed for anaphylaxis May repeat one time in 5-15 minutes if response to initial dose is inadequate. 2 each 3     eszopiclone (LUNESTA) 3 MG tablet Take 3 mg by mouth At Bedtime        famotidine (PEPCID) 20 MG tablet Take 1 tablet (20 mg) by mouth daily. 90 tablet 1     fenofibrate (TRICOR) 48 MG tablet TAKE ONE TABLET BY MOUTH ONCE DAILY 90 tablet 3     fluticasone (FLONASE) 50 MCG/ACT nasal spray Spray 2 sprays in nostril every evening. 48 g 2     fluticasone-vilanterol (BREO ELLIPTA) 100-25 MCG/ACT inhaler Inhale 1 puff into the lungs daily. 3 each 3     galcanezumab-gnlm (EMGALITY) 120 MG/ML injection Inject 120 mg subcutaneously every 28 days.       ketotifen fumarate 0.035% 0.035 % SOLN ophthalmic solution Place 1 drop into both eyes daily as needed for itching.       levothyroxine (SYNTHROID/LEVOTHROID) 75 MCG tablet TAKE ONE TABLET BY MOUTH EVERY MORNING 90 tablet 0     lidocaine (XYLOCAINE) 5 % external ointment Apply topically 2 times daily as needed for moderate pain. 50 g 5     lidocaine, viscous, (XYLOCAINE) 2 % solution Swish and spit 15 mLs in mouth every 3 hours as needed for moderate pain. ; Max 8 doses/24 hour period. 100 mL 1     lurasidone (LATUDA) 40 MG TABS tablet Take 40 mg by mouth daily with food.       MAGNESIUM GLYCINATE PO Take 480  "mg by mouth daily.       Melatonin 10 MG TABS tablet Take 10 mg by mouth at bedtime       metoclopramide (REGLAN) 5 MG tablet Take 5 mg by mouth 4 times daily as needed.       metoprolol succinate ER (TOPROL XL) 100 MG 24 hr tablet TAKE 1 TABLET BY MOUTH IN THE EVENING; TO BE USED WITH 200MG IN MORNING 90 tablet 2     metoprolol succinate ER (TOPROL XL) 200 MG 24 hr tablet Take 1 tablet (200 mg) by mouth daily. 90 tablet 1     montelukast (SINGULAIR) 10 MG tablet TAKE ONE TABLET BY MOUTH EVERY EVENING 90 tablet 3     multivitamin w/minerals (MULTI-VITAMIN) tablet Take 1 tablet by mouth daily.       nabumetone (RELAFEN) 500 MG tablet TAKE 1 TO 2 TABLETS BY MOUTH TWICE DAILY AS NEEDED FOR MODERATE PAIN 120 tablet 0     naloxone (NARCAN) 4 MG/0.1ML nasal spray Spray 1 spray (4 mg) into one nostril alternating nostrils as needed for opioid reversal. every 2-3 minutes until assistance arrives. This medication is an antidote and stays on the chart as an \"as needed\" medication 2 each 2     omega-3 acid ethyl esters (LOVAZA) 1 g capsule TAKE TWO CAPSULES BY MOUTH TWICE DAILY 360 capsule 2     omeprazole (PRILOSEC) 20 MG DR capsule Take 1 capsule (20 mg) by mouth daily. 90 capsule 2     oxyCODONE (ROXICODONE) 5 MG tablet Take 1 tablet (5 mg) by mouth every 6 hours as needed for breakthrough pain. Fill 06/28/25 to start 06/30/25. 30 days supply for chronic pain 60 tablet 0     pregabalin (LYRICA) 150 MG capsule TAKE ONE CAPSULE BY MOUTH THREE TIMES DAILY 270 capsule 0     pseudoePHEDrine-guaiFENesin (MUCINEX D)  MG TB12 Take 1 tablet by mouth 2 times daily as needed (congestion).       pyridostigmine (MESTINON) 60 MG tablet Take 1 tablet by mouth 3 times daily       ramipril (ALTACE) 10 MG capsule TAKE ONE CAPSULE BY MOUTH ONCE DAILY 90 capsule 2     Riboflavin 400 MG TABS Take 400 mg by mouth daily.       rosuvastatin (CRESTOR) 40 MG tablet TAKE ONE TABLET BY MOUTH ONCE DAILY 90 tablet 3     sodium fluoride 1.1 % CREA " "At Bedtime       syringe/needle, disp, (B-D LUER-LUCILLE SYRINGE) 25G X 1\" 3 ML MISC USE WITH B12 INJECTIONS 12 each 3     Tirzepatide (MOUNJARO) 7.5 MG/0.5ML SOAJ auto-injector pen Inject 0.5 mLs (7.5 mg) subcutaneously every 7 days. 2 mL 3     UBRELVY 100 MG tablet Take 100 mg by mouth at onset of headache       valACYclovir (VALTREX) 500 MG tablet Take 1 tablet (500 mg) by mouth daily. (Patient taking differently: Take 500 mg by mouth daily. Taking 1000 mg three times daily by mouth) 90 tablet 2     vitamin B complex with vitamin C (STRESS TAB) tablet Take 1 tablet by mouth daily       ZINC SULFATE-VITAMIN C MT Take 1 tablet by mouth daily         Allergies:   Allergies   Allergen Reactions     Amoxicillin-Pot Clavulanate Difficulty breathing     Banana Shortness Of Breath     Pt reports organic Banana is okay.      Clavulanic Acid Anaphylaxis     Other Reaction(s): Throat swelling     Nitroglycerin Palpitations     Penicillins Anaphylaxis     Provigil [Modafinil] Shortness Of Breath     headache     Gadolinium Hives and Itching     Patient was premedicated for the contrast allergy. He did still have a reaction a few hours after injection. Hives and itching. Dr. Gomez told tech to inform pt he should only have contrast again in the future when premedicated and at a hospital. Not at an outpatient facility.      Haemophilus B Polysaccharide Vaccine Rash     Quadrivalent, cell based     Influenza Virus Vaccine Rash     Quadrivalent, cell based     Ketoconazole      Topical cream caused swelling and itching     Dye [Contrast Dye] Other (See Comments) and Hives     Moderate flushing, CT contrast  Iodinated and gadolinium     Formoterol      Gabapentin      Other reaction(s): hives     Golimumab      Hives, bradycardia, face swelling     Mometasone      Naproxen      Other reaction(s): Bleeding Gums     Neurontin [Gabapentin] Hives     Moderate hives     Nortriptyline Hives     Nystatin Hives     Varicella Virus " "Vaccine Live      Rash     Adhesive Tape Rash     Baclofen Other (See Comments)     Cognitive changes     Cefdinir Blisters, Dermatitis, Difficulty breathing, Other (See Comments) and Rash     Flagyl [Metronidazole Hcl] Palpitations and Hives     Latex Rash     Metronidazole Palpitations, Other (See Comments) and Rash     dizziness (versus ciprofloxacin taken at same time)     Sulfamethizole Rash     Other Reaction(s): Rash       Social History     Tobacco Use     Smoking status: Never     Passive exposure: Never     Smokeless tobacco: Never   Substance Use Topics     Alcohol use: Not Currently     Comment: occ 1/week       Family History   Problem Relation Age of Onset     Musculoskeletal Disorder Mother         back     Anxiety Disorder Mother      Colon Polyps Mother      Ulcerative Colitis Mother         and ischemic small intestine, surgery     Hypertension Mother      Breast Cancer Mother      Osteoporosis Mother      Diabetes Mother         Type 2, Diagnosed in 2014     Depression Mother         Takes Cymbalta to help with chronic pain + depx     Thyroid Disease Mother         Hypothyroidism     Obesity Mother         Under much better control latter half of 2015     Cataracts Mother      Musculoskeletal Disorder Father         back     Substance Abuse Father         Alcohol     Hypertension Father      Hyperlipidemia Father      Depression Father         Off meds for many years. Seems \"ok\"     Anxiety Disorder Father      Heart Disease Maternal Grandmother      Heart Disease Maternal Grandfather      Psychotic Disorder Paternal Grandfather      Suicide Paternal Grandfather      Depression Paternal Grandfather         Pediatrician. Committed suicide by pistol in 1990.     Musculoskeletal Disorder Brother         back     Depression Brother         Expressed as anger and moodiness     Substance Abuse Brother         Alcohol     Anxiety Disorder Brother      Pulmonary Embolism Brother      Substance Abuse " Sister         Alcohol     Depression Sister         Mental Health Therapist, yet no anti-depressants?     Anxiety Disorder Sister         Mental Health Therapist, yet no anti-anxiety meds?     Deep Vein Thrombosis (DVT) Sister         acssociated with OCP use     Bladder Cancer Other      Colon Cancer No family hx of      Crohn's Disease No family hx of      Anesthesia Reaction No family hx of      Skin Cancer No family hx of      Bleeding Disorder No family hx of      Glaucoma No family hx of      Macular Degeneration No family hx of        Physical Examination:  /76 (BP Location: Left arm, Patient Position: Sitting, Cuff Size: Adult Large)   Pulse 83   Wt 132.9 kg (293 lb)   SpO2 97%   BMI 35.67 kg/m      Wt Readings from Last 4 Encounters:   07/02/25 132.9 kg (293 lb 1.6 oz)   06/05/25 130.6 kg (288 lb)   05/15/25 130.6 kg (288 lb)   04/22/25 130.1 kg (286 lb 14.4 oz)     10/19/22 : 140.6 kg (310 lb)  08/10/22 : 142.9 kg (315 lb)  07/11/22 : 143.8 kg (317 lb)  06/08/22 : 144.6 kg (318 lb 12.8 oz)    General: Well appearing tall obese man  in no distress,  Eyes: EOMI. Sclerae and conjunctivae are clear.      Labs and Studies:   Recent Labs   Lab Test 09/16/24  0856 06/24/24  1209 12/12/23  0823 02/15/23  0833 08/03/22  0947   FSH  --   --  7.1  --  6.9   TSH 2.64 3.36 1.69   < >  --     < > = values in this interval not displayed.       Lab Results   Component Value Date    ALT 21 05/28/2025    AST 27 05/28/2025    BILITOTAL 0.2 05/28/2025    CR 1.15 05/28/2025     05/28/2025    TSH 2.64 09/16/2024    ALKPHOS 67 05/28/2025    TESTOSTTOTAL 299 12/12/2023    TESTOSTTOTAL 194 (L) 08/03/2022    TESTOSTTOTAL 203 (L) 07/25/2022    TESTOSTTOTAL 354 03/19/2013    SHBGT 23 12/12/2023    SHBGT 17 08/03/2022    SHBGT 15 07/25/2022    FT 7.14 12/12/2023    FT 5.12 08/03/2022    FT 5.64 07/25/2022    PSA 0.09 01/29/2025    PSA 0.13 12/12/2023    PSA 0.13 12/12/2023    TRIG 359 (H) 01/15/2025    TRIG 305 (H)  12/12/2023    TRIG 277 (H) 02/15/2023    LDL 49 01/15/2025    HDL 34 (L) 01/15/2025    HDL 33 (L) 12/12/2023    HDL 34 (L) 02/15/2023    PROLACTIN 25 (H) 01/19/2012       Lab Results   Component Value Date     05/28/2025    CO2 27 05/28/2025    CHLORIDE 104 05/28/2025    CR 1.15 05/28/2025    TRIG 359 (H) 01/15/2025    HDL 34 (L) 01/15/2025    HGB 14.9 05/28/2025     Lab Results   Component Value Date    ALT 21 05/28/2025    AST 27 05/28/2025    BILITOTAL 0.2 05/28/2025    CR 1.15 05/28/2025     05/28/2025    TSH 2.64 09/16/2024    ALKPHOS 67 05/28/2025    TESTOSTTOTAL 299 12/12/2023    TESTOSTTOTAL 194 (L) 08/03/2022    TESTOSTTOTAL 203 (L) 07/25/2022    TESTOSTTOTAL 354 03/19/2013    SHBGT 23 12/12/2023    SHBGT 17 08/03/2022    SHBGT 15 07/25/2022    FT 7.14 12/12/2023    FT 5.12 08/03/2022    FT 5.64 07/25/2022    PSA 0.09 01/29/2025    PSA 0.13 12/12/2023    PSA 0.13 12/12/2023    TRIG 359 (H) 01/15/2025    TRIG 305 (H) 12/12/2023    TRIG 277 (H) 02/15/2023    LDL 49 01/15/2025    HDL 34 (L) 01/15/2025    HDL 33 (L) 12/12/2023    HDL 34 (L) 02/15/2023    PROLACTIN 25 (H) 01/19/2012     Lab Results   Component Value Date     05/28/2025    CHLORIDE 104 05/28/2025    CO2 27 05/28/2025    GLC 96 05/28/2025    CR 1.15 05/28/2025    CR 1.17 04/01/2025    CR 1.02 12/31/2024    CR 1.11 10/30/2024    CR 0.88 09/26/2024    ALYCE 9.7 05/28/2025    MAG 2.1 08/19/2013    ALBUMIN 4.5 05/28/2025    ALKPHOS 67 05/28/2025    LDL 49 01/15/2025    HDL 34 (L) 01/15/2025    TRIG 359 (H) 01/15/2025    TSH 2.64 09/16/2024    TSH 3.36 06/24/2024    TSH 1.69 12/12/2023     Lab Results   Component Value Date    MICROL 17.6 05/28/2025    MICROL 14.4 04/13/2024    MICROL 73.0 12/12/2023    MICROL 9 09/23/2022    MICROL 8 11/10/2021     Lab Results   Component Value Date    A1C 5.8 (H) 01/15/2025    A1C 5.5 06/24/2024    A1C 5.7 (H) 02/15/2023    A1C 6.4 (H) 09/23/2022    A1C 6.3 (H) 02/10/2022       Lab Results   Component  Value Date    HGB 14.9 05/28/2025           Again, thank you for allowing me to participate in the care of your patient.        Sincerely,        Abbie Cuenca MD    Electronically signed

## 2025-07-11 ASSESSMENT — SLEEP AND FATIGUE QUESTIONNAIRES
HOW LIKELY ARE YOU TO NOD OFF OR FALL ASLEEP WHILE SITTING AND TALKING TO SOMEONE: WOULD NEVER DOZE
HOW LIKELY ARE YOU TO NOD OFF OR FALL ASLEEP WHILE SITTING AND READING: SLIGHT CHANCE OF DOZING
HOW LIKELY ARE YOU TO NOD OFF OR FALL ASLEEP WHILE WATCHING TV: SLIGHT CHANCE OF DOZING
HOW LIKELY ARE YOU TO NOD OFF OR FALL ASLEEP WHILE SITTING QUIETLY AFTER LUNCH WITHOUT ALCOHOL: SLIGHT CHANCE OF DOZING
HOW LIKELY ARE YOU TO NOD OFF OR FALL ASLEEP WHILE SITTING INACTIVE IN A PUBLIC PLACE: WOULD NEVER DOZE
HOW LIKELY ARE YOU TO NOD OFF OR FALL ASLEEP WHEN YOU ARE A PASSENGER IN A CAR FOR AN HOUR WITHOUT A BREAK: SLIGHT CHANCE OF DOZING
HOW LIKELY ARE YOU TO NOD OFF OR FALL ASLEEP WHILE LYING DOWN TO REST IN THE AFTERNOON WHEN CIRCUMSTANCES PERMIT: MODERATE CHANCE OF DOZING
HOW LIKELY ARE YOU TO NOD OFF OR FALL ASLEEP IN A CAR, WHILE STOPPED FOR A FEW MINUTES IN TRAFFIC: WOULD NEVER DOZE

## 2025-07-11 NOTE — PROGRESS NOTES
[facility]      Joel Pineda is a 50 year old male who is being evaluated via a billable virtual visit.      VIDEO VISIT   How would you like to obtain your AVS? MyChart  If you are dropped from the video visit, the video invite should be resent to: Text to cell phone: [phone]  Will anyone else be joining your video visit? NO,  Is patient CURRENTLY in [place]? YES  If patient encounters technical issues they should call [phone]    Video-Visit Details  Type of service:  Video Visit  Video Start Time: 9:33 AM  Video End Time: 9:47 AM  Total Face to Face Time: 14 minutes   Originating Location (pt. Location): Home  Distant Location (provider location):  [facility]   [facility]      CHIEF COMPLAINT: Chronic pain    INTERVAL HISTORY:  Last seen on 5/15/2025.        Recommendations/plan at the last visit included:  Health Psychology: YES Per Shirley's recommendation.   30 minutes Video or Clinic follow-up with JOCELYN Zavala, NP-C in 2 months or sooner as needed.   Referrals: Call Dr Biggs's office and make an appointment due return of pre-surgery symptoms.   Medication Management :   Cyclobenzaprine 5 m-2 tabs 3 times per day as needed for muscle spasms.     Since last visit:   - Pain control is stable. Less stress with [facility] duties as one person who has been difficult is no longer allowed to contact him outside of meetings.     Pain Information Today: Score: Moderate Pain (4)/10. Location of pain: low back pain     Annual Requirements last collected: 2025      Current Pain Relevant Medications:    Acetaminophen 500 mg once daily  Cyclobenzaprine 5 mg 2-3 x/day   Cymbalta 120 mg in AM  Emgality 120 mg/ml q 28 day  Lidocaine 55% ointment  Methocarbamol 500 mg 1-2 QID PRN  Nabumetone 500 mg 1-2 times a day  Lidocaine gel topically 2-3 times daily  Pregabalin  150 mg TID   Risperdal 0.5 mg  1-2 x daily   Ubrelvy 100 mg at onset of migraine      Pain Related Controlled Substances:   Clonazepam 0.5 mg, 2 tabs at HS.  Lunesta 3 mg at HS   Oxycodone 5 mg 1-2 tabs per day PRN              Total opiate dose: 7.5-15 MME     Previous Pain Relevant Medications: (H--helped; HI--Helped initially; [facility]--Somewhat helpful; NH--No help; W--worse; SE--side effects; ?--Unsure if helpful)   NOTE: This medication information taken from patient's intake form, not medical records.   Opiates: Oxycodone: H, Tramadol:[facility]. SE, Codeine: NH  NSAIDS: Celebrex: [facility], Nabumetone:H, Naproxen: SE  Anti-migraine medications: Midrin? , Maxalt:H  Muscle Relaxants: Baclofen:[facility], Flexeril:H, Tizaidine:?, Methocarbamol:HI  Neuropathics: Lyrica:H  Anti-depressants: Abilify:SE, Brintellix: NH, Wellbutrin: ?, Cymbalta: NH, Nortriptyline: NH, Seroquel:   [facility], Risperdal: NH, Effexor:?, Cymbalta ?  Anxiety medications: Xanax: H, Klonpin: H, lorazepam: H      Topicals: Lidocaine:H  Sleep medications:Belsomra: NH, Melatonin:H, Trazodone NH, Ambien:NH  Other medications not covered above: Humira: All, Enbrel; H, dicyclomine:H, Medrol:[facility], Prednisone:H     Any illicit drug use: denies   EtOH use: none   Caffeine use: 6-8 per day   Nicotine use: None     Past Pain Treatments:   Pain Clinic:   Yes  [facility] pain management: For spinal injections with Dr Diaz, [facility]: injections, nerve ablation, [facility] for SCS treatment.  Did trial but did not find it beneficial. [facility] chronic pain program.    PT: Yes  For other reasons. Neck, back and feet  Psychologist: Yes ongoing with private therapist[facility]at  [facility].   Chiropractor: Yes years ag              Acupuncture: Yes [facility]  Pharmacotherapy:  Opioids: Yes  Non-opioids:    Yes   TENs Unit:Yes H for back   Injections:   Bilateral Lateral Femoral Cutaneous Nerve Block:   4/14/22, 5/11/22, 5/1/23, 9/5/23, 5/6/24,  8/19/24, 12/9/24, 4/16/25  TPIs, ONB, Bursa, joint steroid injections:   6/20/22, 9/8/22,  1/9/23, 5/1/23, 8/9/23, 2/26/24  Bilateral SI joint injections:   2/6/2024  Bilateral RFA L4, L5  1/24/23, 12/1/23  Bilateral Lumbar Transforaminal SAMINA:   3/15/22  Surgeries related to pain: Yes   9/25/2024: Laminectomy bilateral 4-5 (Anitha Biggs MD)   10/2007 L5-S1 laminectomy, microdiscectomy          THE 4 As OF OPIOID MAINTENANCE ANALGESIA    Analgesia: Is pain relief clinically significant? YES   Activity: Is patient functional and able to perform Activities of Daily Living? YES   Adverse effects: Is patient free from adverse side effects from opiates? YES   Adherence to Rx protocol: Is patient adhering to Controlled Substance Agreement and taking medications ONLY as ordered? YES     Is Narcan prescribed for opiate use >50 MME daily or concurrent use of opiates and benzodiazepines?  Yes    [place] Board of Pharmacy Data Base Reviewed:    YES; As expected, no concern for misuse/abuse of controlled medications based on this report. Reviewed [facility] July 11, 2025- no concerning fills.    _______________________________________________      Physical Exam and Vital Signs not completed due to virtual visit.     General: Verbal, alert and no distress   Psychiatric:  affect and mood normal, mentation appears normal.     DIRE Score for ongoing opioid management is calculated as follows:    Diagnosis = 3 pts (advanced condition; severe pain/objective findings)    Intractability = 3 pts (patient fully engaged but inadequate response to treatments)    Risk        Psych = 3 pts (no significant personality dysfunction/mental illness; good communication with clinic)         Chem Hlth = 3 pts (no history of chemical dependency; not drug-focused)       Reliability = 2 pts (occasional difficulties with compliance; generally reliable)       Social = 2 pts (reduction in some relationships/life rolls)       (Psych + Chem hlth + Reliability + Social) = 16    Efficacy = 2 pts (moderate benefit/function; low med dose; too  early/not tried meds)    DIRE Score = 18        7-13: likely NOT suitable candidate for long-term opioid analgesia       14-21: may be a suitable candidate for long-term opioid analgesia     Previous Diagnostic Tests:   Imaging Studies:      7/17/2024: MR LUMBAR SPINE W/O CONTRAST   IMPRESSION: Interval progression of multilevel lumbar spondylosis, most pronounced at L4-L5 and L5-S1, when compared to 6/27/2019. There is moderate to severe left neuroforaminal stenosis at L4-L5 with  abutment of the exiting left L4 nerve. Additional moderate to severe  bilateral neuroforaminal stenosis with abutment of the exiting  bilateral L5 nerves at L5-S1 and abutment of the descending left S1  nerve root.         PAIN RELEVANT CONDITIONS:   1.  Ankylosing spondylosis, Lumbar DDD with left S1 nerve involvement, lumbar stenosis  2.  Chronic migraine headaches  3.  Chronic cervical pain, DDD  4.  Peripheral small fiber neuropathy    ASSESSMENT AND PLAN    (G89.29) Chronic intractable pain  (primary encounter diagnosis)  (M47.816) Lumbar facet arthropathy  (G43.111) Intractable migraine with aura with status migrainosus  (M45.8) Ankylosing spondylitis of sacral region (H)  (G62.9) Peripheral polyneuropathy  Comment: Tito is doing well overall, notes decreased stress r/t home association issues, Would like to repeat lumbar RFA, last done 2 years ago.   Plan: oxyCODONE (ROXICODONE) 5 MG tablet  PAIN INJECTION: bilateral L4-5 RFA ordered        PATIENT INSTRUCTIONS:     Diagnosis reviewed, treatment option addressed, and risk/benefits discussed.  Self-care instructions given.  I am recommending a multidisciplinary treatment plan to help this patient better manage pain.    Remember to request ALL medication refills 5-7 BUSINESS days before you run out.     Health Psychology: YES, Continue per your psychologist's recommendations.  Physical therapy: NO, Continue daily home exercises   30 minute Clinic follow-up with JOCELYN Zavala,  NP-C in 9/15/25 at 9:30 AM.  Ok to schedule up to three follow up appts at a time, alternating virtual and clinic appointments.   Procedures recommended: Lumbar RFA ordered    Medication Management : Oxycodone refilled to  on 7/28/25    I have reviewed the note as documented above.  This accurately captures the substance of my conversation with the patient.    BILLING TIME DOCUMENTATION:   TOTAL TIME on the date of service includes:   Time spent preparing to see the patient: 2 minutes (reviewing records and tests)  Time spend face to face with the patient: 14 minutes  Time spent ordering tests, medications, procedures and referrals: 0 minutes  Time spent Referring and communicating with other healthcare professionals: 0 minutes  Documenting clinical information in Epic: 25 minutes    The total TIME spent on this patient on the day of the appointment was 41 minutes.     JOCELYN Padgett, NP-C  [facility]    (Information in italics and blue color are taken from previous pain and consulting medical providers notes and are documented as such)

## 2025-07-12 DIAGNOSIS — K21.9 GASTROESOPHAGEAL REFLUX DISEASE WITHOUT ESOPHAGITIS: ICD-10-CM

## 2025-07-14 ENCOUNTER — VIRTUAL VISIT (OUTPATIENT)
Dept: PALLIATIVE MEDICINE | Facility: OTHER | Age: 50
End: 2025-07-14
Attending: NURSE PRACTITIONER
Payer: MEDICARE

## 2025-07-14 ENCOUNTER — OFFICE VISIT (OUTPATIENT)
Dept: SLEEP MEDICINE | Facility: CLINIC | Age: 50
End: 2025-07-14
Payer: MEDICARE

## 2025-07-14 VITALS
DIASTOLIC BLOOD PRESSURE: 72 MMHG | HEIGHT: 76 IN | OXYGEN SATURATION: 96 % | WEIGHT: 290 LBS | HEART RATE: 78 BPM | BODY MASS INDEX: 35.31 KG/M2 | SYSTOLIC BLOOD PRESSURE: 106 MMHG

## 2025-07-14 DIAGNOSIS — M45.8 ANKYLOSING SPONDYLITIS OF SACRAL REGION (H): ICD-10-CM

## 2025-07-14 DIAGNOSIS — G43.111 INTRACTABLE MIGRAINE WITH AURA WITH STATUS MIGRAINOSUS: ICD-10-CM

## 2025-07-14 DIAGNOSIS — G89.29 CHRONIC INTRACTABLE PAIN: Primary | ICD-10-CM

## 2025-07-14 DIAGNOSIS — G62.9 PERIPHERAL POLYNEUROPATHY: ICD-10-CM

## 2025-07-14 DIAGNOSIS — M47.816 LUMBAR FACET ARTHROPATHY: ICD-10-CM

## 2025-07-14 DIAGNOSIS — G47.33 OSA (OBSTRUCTIVE SLEEP APNEA): Primary | ICD-10-CM

## 2025-07-14 PROCEDURE — 99215 OFFICE O/P EST HI 40 MIN: CPT | Performed by: PSYCHIATRY & NEUROLOGY

## 2025-07-14 PROCEDURE — 3078F DIAST BP <80 MM HG: CPT | Performed by: PSYCHIATRY & NEUROLOGY

## 2025-07-14 PROCEDURE — 98007 SYNCH AUDIO-VIDEO EST HI 40: CPT | Performed by: NURSE PRACTITIONER

## 2025-07-14 PROCEDURE — 1125F AMNT PAIN NOTED PAIN PRSNT: CPT | Performed by: NURSE PRACTITIONER

## 2025-07-14 PROCEDURE — 3074F SYST BP LT 130 MM HG: CPT | Performed by: PSYCHIATRY & NEUROLOGY

## 2025-07-14 PROCEDURE — 1125F AMNT PAIN NOTED PAIN PRSNT: CPT | Performed by: PSYCHIATRY & NEUROLOGY

## 2025-07-14 RX ORDER — FAMOTIDINE 20 MG/1
20 TABLET, FILM COATED ORAL DAILY
Qty: 90 TABLET | Refills: 0 | OUTPATIENT
Start: 2025-07-14

## 2025-07-14 RX ORDER — OXYCODONE HYDROCHLORIDE 5 MG/1
5 TABLET ORAL EVERY 6 HOURS PRN
Qty: 60 TABLET | Refills: 0 | Status: SHIPPED | OUTPATIENT
Start: 2025-07-14

## 2025-07-14 ASSESSMENT — PAIN SCALES - GENERAL: PAINLEVEL_OUTOF10: MODERATE PAIN (4)

## 2025-07-14 NOTE — NURSING NOTE
"Chief Complaint   Patient presents with    Sleep Problem     Annual RBD and CPAP f/u       Initial /72   Pulse 78   Ht 1.93 m (6' 4\")   Wt 131.5 kg (290 lb)   SpO2 96%   BMI 35.30 kg/m   Estimated body mass index is 35.3 kg/m  as calculated from the following:    Height as of this encounter: 1.93 m (6' 4\").    Weight as of this encounter: 131.5 kg (290 lb).    Medication Reconciliation: complete  ESS 6  Jing Cunningham MA   "

## 2025-07-14 NOTE — PATIENT INSTRUCTIONS
After Visit Instructions:     Thank you for coming to [facility] for your care. It is my goal to partner with you to help you reach your optimal state of health.   Continue daily self-care, identifying contributing factors, and monitoring variations in pain level. Continue to integrate self-care into your life.      Health Psychology: YES, Continue per your psychologist's recommendations.  Physical therapy: NO, Continue daily home exercises   30 minute Clinic follow-up with JOCELYN Zavala NP-C in 9/15/25 at 9:30 AM.  Ok to schedule up to three follow up appts at a time, alternating virtual and clinic appointments.   Procedures recommended: Lumbar RFA ordered    Medication Management : Oxycodone refilled to  on 7/28/25      JOCELYN Padgett NP-C  [facility]  [facility] - Monday, Thursday and Friday  [facility] - Tuesday      Be sure to request ALL medication refills 5 days prior to the due date whether or not you will see your medical provider in an appointment before the due date.      Do not expect same day refills. If you do not plan ahead you may run out of medications.     Early refills are not provided.  It is your responsibility to manage your medications responsibility and keep them safely stored. Lost or destroyed medications WILL NOT be replaced    Scheduling/Clinic telephone Number for ALL locations:  [phone]    After Hours On-Call Service:  [phone]    Call with any questions about your care and for scheduling assistance.   Calls are returned Monday through Friday between 8 AM and 4:00 PM. We usually get back to you within 2 business days depending on the issue/request.    If we are prescribing your medications:  For opioid medication refills, call the clinic or send a [employer] message 7 days in advance.  Please include:  Your name and date of birth.   Name of requested medication  Name of the pharmacy.  For non-opioid  medications, call your pharmacy directly to request a refill. Please allow 3-4 days to be processed.   Per [place] State Law:  All controlled substance prescriptions must be filled within 30 days of being written.    For those controlled substances allowing refills, pickup must occur within 30 days of last fill.      We believe regular attendance is key to your success in our program!    Any time you are unable to keep your appointment we ask that you call us at least 24 hours in advance to cancel.This will allow us to offer the appointment time to another patient.   Multiple missed appointments may lead to dismissal from the clinic.

## 2025-07-15 ASSESSMENT — PATIENT HEALTH QUESTIONNAIRE - PHQ9
SUM OF ALL RESPONSES TO PHQ QUESTIONS 1-9: 8
SUM OF ALL RESPONSES TO PHQ QUESTIONS 1-9: 8
10. IF YOU CHECKED OFF ANY PROBLEMS, HOW DIFFICULT HAVE THESE PROBLEMS MADE IT FOR YOU TO DO YOUR WORK, TAKE CARE OF THINGS AT HOME, OR GET ALONG WITH OTHER PEOPLE: VERY DIFFICULT

## 2025-07-16 ENCOUNTER — VIRTUAL VISIT (OUTPATIENT)
Facility: CLINIC | Age: 50
End: 2025-07-16
Payer: MEDICARE

## 2025-07-16 DIAGNOSIS — F41.1 GENERALIZED ANXIETY DISORDER: ICD-10-CM

## 2025-07-16 DIAGNOSIS — F33.1 MAJOR DEPRESSIVE DISORDER, RECURRENT EPISODE, MODERATE (H): Primary | ICD-10-CM

## 2025-07-16 PROCEDURE — 90837 PSYTX W PT 60 MINUTES: CPT | Mod: 95 | Performed by: PSYCHOLOGIST

## 2025-07-16 ASSESSMENT — ANXIETY QUESTIONNAIRES
GAD7 TOTAL SCORE: 7
1. FEELING NERVOUS, ANXIOUS, OR ON EDGE: SEVERAL DAYS
IF YOU CHECKED OFF ANY PROBLEMS ON THIS QUESTIONNAIRE, HOW DIFFICULT HAVE THESE PROBLEMS MADE IT FOR YOU TO DO YOUR WORK, TAKE CARE OF THINGS AT HOME, OR GET ALONG WITH OTHER PEOPLE: SOMEWHAT DIFFICULT
5. BEING SO RESTLESS THAT IT IS HARD TO SIT STILL: NOT AT ALL
3. WORRYING TOO MUCH ABOUT DIFFERENT THINGS: SEVERAL DAYS
GAD7 TOTAL SCORE: 7
6. BECOMING EASILY ANNOYED OR IRRITABLE: NEARLY EVERY DAY
7. FEELING AFRAID AS IF SOMETHING AWFUL MIGHT HAPPEN: SEVERAL DAYS
2. NOT BEING ABLE TO STOP OR CONTROL WORRYING: SEVERAL DAYS

## 2025-07-16 ASSESSMENT — PATIENT HEALTH QUESTIONNAIRE - PHQ9: 5. POOR APPETITE OR OVEREATING: NOT AT ALL

## 2025-07-16 NOTE — PROGRESS NOTES
[facility]                                     Progress Note    Patient Name: Joel Pineda  Date: July 16, 2025          Service Type: Individual      Session Start Time: 4:00 PM Session End Time: 4:53 PM     Session Length:  53 minutes    Session #: 29    Attendees: Client attended alone    Service Modality:  Video Visit:      Provider verified identity through the following two step process.  Patient provided:  Patient photo and Patient is known previously to provider    Telemedicine Visit: The patient's condition can be safely assessed and treated via synchronous audio and visual telemedicine encounter.      Reason for Telemedicine Visit: Patient convenience (e.g. access to timely appointments / distance to available provider)    Originating Site (Patient Location): Patient's home    Distant Site (Provider Location): Provider Remote Setting- Home Office    Consent:  The patient/guardian has verbally consented to: the potential risks and benefits of telemedicine (video visit) versus in person care; bill my insurance or make self-payment for services provided; and responsibility for payment of non-covered services.     Patient would like the video invitation sent by:  Text to cell phone: [phone]    Mode of Communication:  Video Conference via Amwell    Distant Location (Provider):  Off-site    As the provider I attest to compliance with applicable laws and regulations related to telemedicine.    DATA  Interactive Complexity: Yes, visit entailed Interactive Complexity evidenced by:  -The need to manage maladaptive communication (related to, e.g., high anxiety, high reactivity, repeated questions, or disagreement) among participants that complicates delivery of care  -Caregiver emotions/behavior that interfere with implementation of the treatment plan  Crisis: No        Progress Since Last Session (Related to Symptoms / Goals / Homework):   Symptoms: Worsening notes his mood is 'not the  best'    Homework: Achieved / completed to satisfaction       Episode of Care Goals: Satisfactory progress - CONTEMPLATION (Considering change and yet undecided); Intervened by assessing the negative and positive thinking (ambivalence) about behavior change     Current / Ongoing Stressors and Concerns:   Tito shared about his recent NONA board meeting and health updates. He shared about what he learned about the 'garage' NONA meeting. He enjoyed seeing his sister when she was in town, father was also in town and they spent an afternoon together. His vocal fold injections are end of this week; he is hopeful this will be more comfortable than last injection. He reports that the recent  has been told she can only contact him or the other  through the management company. She implied that Tito had 'pocketed' $95K from the Board bank account and was spreading this to others. Since this meeting she has not been inundated with emails from her. Patient and provider reviewed active strategies to manage stress and resulting emotional distress, especially anxiety. This can often manifest as irritability for Tito. Patient and provider explored possible triggers. He reports he continues to have intrusive images of his mother flat-lining in hospital bed. Explored what it might be like if he felt he had a purpose: working PT job. He reports his throat is sore due to being due for injections. He states his back is also fairly sore today as well. He reports communicating directly with his father which he feels good about.      Treatment Objective(s) Addressed in This Session:   identify three distraction and diversion activities and use those activities to decrease level of anxiety   and use cognitive strategies identified in therapy to challenge anxious thoughts, Increase interest, engagement, and pleasure in doing things  Decrease frequency and intensity of feeling down, depressed, hopeless  Identify  negative self-talk and behaviors: challenge core beliefs, myths, and actions, identify 5 traits or charcteristics you like about yourself, practice one mindfulness skill each day for 5 minutes and reduce their emotional vulnerability by 40% during the Emotion Regulation Module (6-8 weeks)      Intervention:      CBT: cognitive challenging, restructuring, reframing Behavioral activation techniques, grounding strategies  PCT: supportive autonomy, unconditional positive regard, empathic reflection, active listening  DBT: self-soothing with the 5 senses, TIP Skill, IMPROVE the moment, assertive communication/DEAR MAN    Assessments completed prior to visit:  The following assessments were completed by patient for this visit:  PHQ9:       4/15/2025     2:32 PM 4/22/2025     2:28 PM 4/29/2025     3:35 PM 6/3/2025    11:18 AM 6/11/2025     1:11 PM 6/18/2025     9:22 AM 7/15/2025    10:11 AM   PHQ-9 SCORE   PHQ-9 Total Score MyChart 7 (Mild depression) 10 (Moderate depression) 9 (Mild depression) 9 (Mild depression) 9 (Mild depression) 8 (Mild depression) 8 (Mild depression)   PHQ-9 Total Score 7  10  9  9  9  8  8        Patient-reported     GAD7:       2/18/2025     2:18 PM 3/12/2025    11:39 AM 4/15/2025     2:35 PM 4/29/2025     3:35 PM 6/11/2025     2:25 PM 6/18/2025     9:24 AM 7/16/2025     4:08 PM   YUDI-7 SCORE   Total Score 10 (moderate anxiety) 15 (severe anxiety) 12 (moderate anxiety) 13 (moderate anxiety)  6 (mild anxiety)    Total Score 10  15  12  13  8 6  7       Patient-reported         ASSESSMENT: Current Emotional / Mental Status (status of significant symptoms):   Risk status (Self / Other harm or suicidal ideation)   Patient reports the following current fears or concerns for personal safety: Ongoing passive thoughts of suicide with absence of plan or intent and agreed to follow his previously developed [place] Safety Plan.   Patient denies current or recent suicidal ideation or  behaviors.   Patient denies current or recent homicidal ideation or behaviors.   Patient denies current or recent self injurious behavior or ideation.   Patient denies other safety concerns.   Patient reports there has been no change in risk factors since their last session.     Patient reports there has been no change in protective factors since their last session.     A safety and risk management plan has been developed including: Patient consented to co-developed safety plan on 10/21/2024.  Safety and risk management plan was reviewed.   Patient agreed to use safety plan should any safety concerns arise.  A copy was made available to the patient.     Appearance:   Appropriate    Eye Contact:   Poor   Psychomotor Behavior: Normal    Attitude:   Cooperative    Orientation:   All   Speech    Rate / Production: Impoverished     Volume:  Normal    Mood:    Anxious  Depressed  Sad    Affect:    Subdued  Worrisome    Thought Content:  Poverty of thought   Thought Form:  Coherent    Insight:    Fair      Medication Review:   Changes to psychiatric medications, see updated Medication List in EPIC.  Started Latuda since last visit.     Medication Compliance:   Yes - may not take some PRNs as often as he could     Changes in Health Issues:   None reported     Chemical Use Review:   Substance Use: Chemical use reviewed, no active concerns identified      Tobacco Use: No current tobacco use.      Diagnosis:  296.32 (F33.1) Major Depressive Disorder, Recurrent Episode, Moderate _.  300.02 (F41.1) Generalized Anxiety Disorder.    Collateral Reports Completed:   Not Applicable    PLAN: (Patient Tasks / Therapist Tasks / Other)  Patient and provider will continue practicing how to reframe automatic thinking from negative situations. Patient to cultivate an awareness of anticipating triggers and predicting it will cause emotional distress.    Patient will practice radical self-compassion techniques to decrease shame.     Patient  to engage in basic self-cares and focus on present moment.     Patient to explore strategies to increase motivation.     Patient to continue to use [place] A[place] skill for situations he anticipates will have negative outcome or evoke strong emotional response.    Patient to create SMART goal for showering 3-4 times per week and track progress with a non-judgmental stance and problem solving attitude.     Shirley Bartlett PsyD LP                                                      ______________________________________________________________________    Individual Treatment Plan    Patient's Name: Joel Pineda  YOB: 1975    Date of Creation: October 30, 2024   Date Treatment Plan Last Reviewed/Revised: October 30, 2024; January 29, 2025; April 30, 2025, June 18, 2025      DSM5 Diagnoses: 296.32 (F33.1) Major Depressive Disorder, Recurrent Episode, Moderate _ or 300.02 (F41.1) Generalized Anxiety Disorder  Psychosocial / Contextual Factors: Medical complexities, Trauma history, Interpersonal concerns.    recent surgery and resulting acute pain, chronic pain, chronic mental health concerns, mobility limitations  PROMIS (reviewed every 90 days):   PROMIS-10 Scores        5/20/2025     5:16 PM 5/31/2025    10:49 AM 6/11/2025     1:13 PM   PROMIS-10 Total Score w/o Sub Scores   PROMIS TOTAL - SUBSCORES 19  20  19        Patient-reported        Referral / Collaboration:  Referral to another professional/service is not indicated at this time..    Anticipated number of session for this episode of care: 9-12 sessions  Anticipation frequency of session: Every other week  Anticipated Duration of each session: 53 or more minutes  Treatment plan will be reviewed in 90 days or when goals have been changed.     MeasurableTreatment Goal(s) related to diagnosis / functional impairment(s)  Goal 1: Patient will decrease anxiety by 75% as evidenced by YUDI-7    I will know I've met my goal when I am at peace with where I'm  at.       Objective #A (Patient Action)                          Patient will identify the situations / thoughts that contribute to feeling anxious.  Status: Continued - Date(s):1/29/2025, 4/30/2025, 6/18/2025       Intervention(s)  Therapist will process anxiety-proving emotions.     Objective #B  Patient will use at least 3 coping skills for anxiety management in the next 4 weeks.  Status: Continued - Date(s):1/29/2025, 4/30/2025, 6/18/2025       Intervention(s)  Therapist will assign homework : coping skills practice to decrease anxiety .     Objective #C  Patient will use cognitive strategies identified in therapy to challenge anxious thoughts.  Status: Continued - Date(s):1/29/2025, 4/30/2025, 6/18/2025        Intervention(s)  Therapist will  teach the patient ways to reframe negative core beliefs that contribute to anxiety.     Goal 2 Patient will report pain levels are consistently rated at 2/10 per patient report.   I will know I've met my goal when my pain is less disruptive.      Objective #A (Patient Action)    Patient will identify 5 strategies for managing pain.  Status: Continued - Date(s):1/29/2025, 4/30/2025, 6/18/2025     Intervention(s)  Therapist will teach strategies to manage pain and assign homework to use 3 strategies daily.    Objective #B  Patient will engage in physical activity and PT exercises 2-3 times daily.  Status: Continued - Date(s):1/29/2025, 4/30/2025, 6/18/2025      Intervention(s)  Therapist will assign homework to engage in at minimum one PT or physical activity daily.    Objective #C  Patient will Identify negative self-talk and behaviors: challenge core beliefs, myths, and actions.  Status: Continued - Date(s):1/29/2025, 4/30/2025, 6/18/2025      Intervention(s)  Therapist will teach non-judgmental stance and help patient reframe negative self-talk .    Goal 3: Patient will report reduction in depressive symptoms as evidenced by PHQ-9 score reduction of 75% or greater.     I  will know I've met my goal when I feel like I have purpose in life.      Objective #A (Patient Action)    Status: Continued - Date(s):1/29/2025, 4/30/2025, 6/18/2025      Patient will Decrease frequency and intensity of feeling down, depressed, hopeless.    Intervention(s)  Therapist will teach emotional recognition/identification. Therapist will reinforce use of emotion regulation skills.    Objective #B  Patient will use healthy communication when describing his emotions or when setting boundaries with others.     Status: Continued - Date(s):1/29/2025, 4/30/2025, 6/18/2025    Intervention(s)  Therapist will teach emotional regulation skills and interpersonal effectiveness skills to improve quality of communication. Therapist will teach assertive communication skills.    Objective #C  Patient will track and record at least 3 pleasant exchanges with family members such as mother, father, sister, brother.  Status: Continued - Date(s):1/29/2025, 4/30/2025, 6/18/2025     Intervention(s)  Therapist will teach about healthy boundaries. Therapist will encourage patient to focus on positive interactions with family and use cope ahead to increase likelihood of pleasant experience as an outcome.    Patient has reviewed and agreed to the above plan.      Shirley Bartlett PsyD LP  June 18, 2025      Answers submitted by the patient for this visit:  Patient Health Questionnaire (Submitted on 12/3/2024)  If you checked off any problems, how difficult have these problems made it for you to do your work, take care of things at home, or get along with other people?: Very difficult  PHQ9 TOTAL SCORE: 10  Patient Health Questionnaire (G7) (Submitted on 12/3/2024)  YUDI 7 TOTAL SCORE: 9    Answers submitted by the patient for this visit:  Patient Health Questionnaire (Submitted on 12/17/2024)  If you checked off any problems, how difficult have these problems made it for you to do your work, take care of things at home, or get along  with other people?: Very difficult  PHQ9 TOTAL SCORE: 15  Patient Health Questionnaire (G7) (Submitted on 12/17/2024)  YUDI 7 TOTAL SCORE: 12    Answers submitted by the patient for this visit:  Patient Health Questionnaire (Submitted on 1/14/2025)  If you checked off any problems, how difficult have these problems made it for you to do your work, take care of things at home, or get along with other people?: Somewhat difficult  PHQ9 TOTAL SCORE: 10    [place] Safety Plan      Creation Date: 10/21/24       Step 1: Warning signs:    Warning Signs    Start thinking about death more      Step 2: Internal coping strategies - Things I can do to take my mind off my problems without contacting another person:    Strategies    Petting cats    Watching music videos    talk to family member    text friend Tono    hot bubble bath      Step 3: People and social settings that provide distraction:    Name Contact Information    Tono - friend [phone]    Mother [phone]         Step 4: People whom I can ask for help during a crisis:    Name Contact Information    Keyana - sister [phone]      Step 5: Professionals or agencies I can contact during a crisis:    Clinician/Agency Name Phone Emergency Contact    Shirley Bartlett [phone]    ANU CHENEY        [facility] Emergency Department Emergency Department Address Emergency Department Phone    [facility]       Suicide Prevention Lifeline Phone: Call or Text 952  Crisis Text Line: Text HOME to 105433     Step 6: Making the environment safer (plan for lethal means safety):   Previous medications - dispose of unused or older medications     Optional: What is most important to me and worth living for?:   Niece  Mother  Sister  Cats     [place] Safety Plan. Shanita Lazcano and Ariel Lorenzo. Used with permission of the authors.          Answers submitted by the patient for this visit:  Patient Health Questionnaire (Submitted on 4/29/2025)  If you  checked off any problems, how difficult have these problems made it for you to do your work, take care of things at home, or get along with other people?: Somewhat difficult  PHQ9 TOTAL SCORE: 9  Patient Health Questionnaire (G7) (Submitted on 4/29/2025)  YUDI 7 TOTAL SCORE: 13    Answers submitted by the patient for this visit:  Patient Health Questionnaire (Submitted on 6/3/2025)  If you checked off any problems, how difficult have these problems made it for you to do your work, take care of things at home, or get along with other people?: Somewhat difficult  PHQ9 TOTAL SCORE: 9    Answers submitted by the patient for this visit:  Patient Health Questionnaire (Submitted on 6/11/2025)  If you checked off any problems, how difficult have these problems made it for you to do your work, take care of things at home, or get along with other people?: Somewhat difficult  PHQ9 TOTAL SCORE: 9    Answers submitted by the patient for this visit:  Patient Health Questionnaire (Submitted on 7/15/2025)  If you checked off any problems, how difficult have these problems made it for you to do your work, take care of things at home, or get along with other people?: Very difficult  PHQ9 TOTAL SCORE: 8

## 2025-07-18 ENCOUNTER — HOSPITAL ENCOUNTER (OUTPATIENT)
Facility: AMBULATORY SURGERY CENTER | Age: 50
Discharge: HOME | End: 2025-07-18
Attending: OTOLARYNGOLOGY
Payer: MEDICARE

## 2025-07-18 VITALS
OXYGEN SATURATION: 99 % | RESPIRATION RATE: 16 BRPM | HEART RATE: 79 BPM | WEIGHT: 290 LBS | HEIGHT: 76 IN | BODY MASS INDEX: 35.31 KG/M2 | TEMPERATURE: 97 F | DIASTOLIC BLOOD PRESSURE: 80 MMHG | SYSTOLIC BLOOD PRESSURE: 107 MMHG

## 2025-07-18 LAB — GLUCOSE BLDC GLUCOMTR-MCNC: 100 MG/DL (ref 70–99)

## 2025-07-18 PROCEDURE — 82962 GLUCOSE BLOOD TEST: CPT | Performed by: PATHOLOGY

## 2025-07-18 DEVICE — IMPLANTABLE DEVICE: Type: IMPLANTABLE DEVICE | Site: THROAT | Status: FUNCTIONAL

## 2025-07-18 RX ORDER — OXYMETAZOLINE HYDROCHLORIDE 0.05 G/100ML
2 SPRAY NASAL ONCE
Status: DISCONTINUED | OUTPATIENT
Start: 2025-07-18 | End: 2025-07-18 | Stop reason: HOSPADM

## 2025-07-18 NOTE — DISCHARGE INSTRUCTIONS
[facility]  Home Care Following Your Procedure  Call a doctor if you have signs of infection (fever, growing tenderness at the surgery site, a large amount of drainage or bleeding, severe pain, foul-smelling drainage, redness, swelling).             Tylenol/Acetaminophen Consumption    If you feel your pain relief is insufficient, you may take Tylenol/Acetaminophen in addition to your narcotic pain medication.   Be careful not to exceed 4,000 mg of Tylenol/Acetaminophen in a 24 hour period from all sources.  If you are taking extra strength Tylenol/acetaminophen (500 mg), the maximum dose is 8 tablets in 24 hours.  If you are taking regular strength acetaminophen (325 mg), the maximum dose is 12 tablets in 24 hours.      Your doctor is:  Dr. Josefina Patino, ENT Otolaryngology: [phone]                  Or dial [phone] and ask for the resident on call for:  ENT Otolaryngology  For emergency care, call the:  [place]:  [phone] (TTY for hearing impaired: [phone])             Diet restrictions: NPO until 12:45PM  Voice restrictions: none  Use tylenol as needed over the counter for pain   Other: Sore throat and discomfort are normal for a few days

## 2025-07-18 NOTE — OP NOTE
Operative Note   Otolaryngology - Head and Neck Surgery       DATE OF OPERATION:   July 18, 2025    PREOPERATIVE DIAGNOSIS:   Glottic insufficiency        POSTOPERATIVE DIAGNOSIS:   Same    NAME OF OPERATION:   Laryngoscopy with injection laryngoplasty of both vocal fold with juvederm    ANESTHESIA  Type: local    SURGEON:   Josefina Patino MD    RESIDENT SURGEON(S):   Suzette Wright MD    INDICATIONS FOR PROCEDURE:   The patient is a 50 year old male with glottic insufficiency and comes in for injection laryngoplasty. The risks, benefits and alternatives of the surgery were discussed. The patient wishes to proceed with surgery and has signed an informed consent.     FINDINGS:   Anatomic/physiological deviations: LNC, 4cc of lidocaine, transthyrohyoid approach did not reach, transcricothyroid approach with good medialization, 0.5cc in each vocal fold   Right vocal process: No restriction of mobility   Left vocal process: No restriction of mobility  Glottal gap: Complete glottal closure  Supraglottic structures: Normal  Hypopharynx: Normal     Post-op plan:       DESCRIPTION OF PROCEDURE:   Then, the patient was seated upright. The areas of the nasopharynx as well as the hypopharynx were anesthetized with topical 4% lidocaine with 0.25% phenylephrine atomizer. The scope was then passed in the right or left nasal cavity, depending upon which side had the greater opening, and passed in through middle or the inferior meatus. Upon reaching the nasopharynx, the patient was instructed to breathe through the nasal cavity and the scope was passed inferiorly into the hypopharynx until the epiglottis was visualized. The scope was then passed beyond the epiglottis and both the brightness and focus were readjusted for maximum resolution. The vocal cords were visualized and the larynx was then carefully identified. An assessment of glottic closure was made. The chin of the patient was moved up and down to identify external  landmarks including the cricoid, cricothyroid membrane, superior notch of the thyroid cartilage, and the hyoid bone. These landmarks were palpated and, based upon this, a decision was made as to the position of the vocal cords in relationship to external landmarks. A 27-gauge needle was then secured to the augmentation syringe and air was removed from the needle. The needle was passed transcartilagenous or transmembranous into the vocal fold. The needle tips were then observed on the video monitor, care being taken not to perforate the membranous vocal cord. After correct needle placement was confirmed on the video monitor, augmentation agent was injected until the vocal cords were able to achieve optimal closure during adduction. During injection the patient s airway was carefully observed to maintain at least a 4 mm airway during injection. The injection was performed in 0.1 ml aliquots, the patient was asked to cough and to phonate  e  after each aliquot to dynamically assess the anatomic and acoustic effects of the injection. The vocal fold was over-injected by approximately 0.1 mg to account for resorption of the water content. Following maximal augmentation, the scope was withdrawn atraumatically.       COMPLICATIONS:   None.  .   ESTIMATED BLOOD LOSS:   Less than 10cc    DISPOSITION:   PACU.    SPECIMENS:  * No specimens in log *       PHOTODOCUMENTATION:

## 2025-07-21 ENCOUNTER — TELEPHONE (OUTPATIENT)
Dept: OTOLARYNGOLOGY | Facility: CLINIC | Age: 50
End: 2025-07-21
Payer: MEDICARE

## 2025-07-21 NOTE — TELEPHONE ENCOUNTER
Left Voicemail (1st Attempt) for the patient to call back and schedule the following:    Appointment type: return slp voice  Provider: any one slp voice provider (held slots w/ Kellie)  Return date: ASAP to start, but next available     Specialty phone number: writer's direct  Additional appointment(s) needed: see below  Additional Notes: 2 voice therapy sessions (virtual or in-person are both fine) about 2-3 weeks apart, okay per BRANDI to use lauro can be virtual or in person    Held slots with kellie on 7/22 and 8/11 w/ pt's name    Dary Santiago on 7/21/2025 at 10:37 AM

## 2025-07-22 ENCOUNTER — TELEPHONE (OUTPATIENT)
Dept: OTOLARYNGOLOGY | Facility: CLINIC | Age: 50
End: 2025-07-22
Payer: MEDICARE

## 2025-07-22 NOTE — TELEPHONE ENCOUNTER
Contacted patient to check in after procedure on Friday. Pt reports doing well. He is doing his voice exercises as instructed and feels like voice is improving. Does not report any breathing trouble and not reporting any severe pain. Pt has schedule his follow up voice therapy appt and writer reminded him of post op visit with provider. Writer did advise pt to reach out with any questions or concerns, pt in agreement.

## 2025-07-25 ENCOUNTER — TELEPHONE (OUTPATIENT)
Dept: PALLIATIVE MEDICINE | Facility: CLINIC | Age: 50
End: 2025-07-25
Payer: MEDICARE

## 2025-07-28 ENCOUNTER — VIRTUAL VISIT (OUTPATIENT)
Dept: OTOLARYNGOLOGY | Facility: CLINIC | Age: 50
End: 2025-07-28
Payer: MEDICARE

## 2025-07-28 ENCOUNTER — E-VISIT (OUTPATIENT)
Dept: FAMILY MEDICINE | Facility: CLINIC | Age: 50
End: 2025-07-28
Payer: MEDICARE

## 2025-07-28 DIAGNOSIS — R49.0 DYSPHONIA: Primary | ICD-10-CM

## 2025-07-28 DIAGNOSIS — J38.3 VOCAL CORD BOWING: ICD-10-CM

## 2025-07-28 DIAGNOSIS — J31.0 CHRONIC NONALLERGIC RHINITIS: Primary | ICD-10-CM

## 2025-07-28 NOTE — LETTER
"7/28/2025       RE: Joel Pineda  [address] [facility] [address] [address] [place] [address]     Dear Colleague,    Thank you for referring your patient, Joel Pineda, to the [facility]. Please see a copy of my visit note below.                                                                                     Outpatient Speech Language Therapy Evaluation - MEDICARE CERTIFICATION    PLAN OF TREATMENT FOR OUTPATIENT REHABILITATION    Patient's Last Name, First Name, M.I.  YOB: 1975  Joel Pineda                        Provider's Name  Luz Adams M.S., CCC-SLP Medical Record No.  [id number]                              Onset Date:  7/28/25     Start of Care Date: 7/28/25     Type: Speech Language Therapy Medical Diagnosis: Dysphonia R49.0                        Therapy Diagnosis: \" \"   Visits from SOC:  1 total/1 therapy   _________________________________________________________________________________  Plan of Treatment:   Speech therapy    Coordinate phonatory and respiratory subsystems, demonstrating adequate independence for activities of daily communication   Decrease extrinsic laryngeal muscle activity during communication events   Improve voice quality in all activities of daily living using, as measured by a minimum of a 50% point reduction on the Voice Handicap Index-10 or Singing VHI  Demonstrate appropriate vocal hygiene including, but not limited to, adequate hydration, avoiding vocal abuse, integrating behavioral and dietary changes for GERD into their daily life?.   Incorporate behaviors to reduce irritation and promote laryngeal healing and prevent recurrence.?   Implement behavioral strategies to reduce laryngopharyngeal reflux. ?   Independently maintain a forward focus voice placement 90% of the time during conversational speech.  Independently perform the Vocal Function Exercises, rebalancing " respiration, phonation, and resonance 90% of the time  Perform High Level Phonation and Pitch Range Navigation Exercises independently 90% of the time  Patient will express satisfaction with their voice quality and function and their ability to participate in social, personal, and work/school functions without limitation      _________________________________________________________________________________    I CERTIFY THE NEED FOR THESE SERVICES FURNISHED UNDER THIS PLAN OF TREATMENT AND WHILE UNDER MY CARE       (Physician attestation of this document indicates review and certification of the therapy plan).     Certification date from: 7/28/25  Certification date to: 10/26/25    Referring Provider: Dr. Josefina Patino    [facility]  Clinical Voice and Upper Airway Evaluation Report    Patient's Name: Joel Pineda  Date of Evaluation: 7/28/2025  Providing SLP: Luz Adams MS CCC-SLP  Referring Provider and Facility: Josefina Patino MD  Insurance Coverage: Medicare (Certification Dates: 7/24/25 - 10/22/25)  Chief Complaint: Dysphonia  Session Location: Tito was seen via telehealth today.     The patient has been notified and verbally consented to the following statements:   This video visit will be conducted between you and your provider.  Patient has opted to conduct today's video visit vs an in-person appointment, and is not able to attend due to possible exposure to COVID-19.    If during the course of the call the provider feels a video visit is not appropriate, you will not be charged for this service.     Provider has received verbal consent for billable virtual visit from the patient? Yes  Preferred method for receiving information: Stellar  Call initiated at: 1:10 PM   Call ended at: 2:17 PM   Platform used to conduct today's virtual appointment: EN[employer] Branch Video  Location of provider: [facility]  Location of patient: Residence         Impressions from  initial evaluation on 1/24/25 with Domi Rehman, [place]AL[place] CCC-SLP:     Joel Pineda is a 49 year old , presenting today with Laryngeal Hyperfunction (J38.7) and Dysphonia (R49.0) in the, as evidenced by evaluation the results of the evaluation,  laryngeal function studies, as well as the laryngeal exam.    Remarkable findings included:  Perceptual evaluation demonstrated: Mild intermittent roughness  Laryngeal exam demonstrated:   Mild bilateral bowing  Primary complaint of patient today included: Dysphonia and an imbalance in respiratory mechanics     Therefore, we recommended a course of speech therapy to address these concerns.     STIMULABILITY: results of therapy probes during perceptual and laryngeal evaluation demonstrate improvement with use of forward resonant stimuli, coordination of respiration and phonation, and use of yawn sigh     RECOMMENDATIONS:   A course of speech therapy is recommended to optimize vocal technique, improve voice quality, and promote reduced discomfort, effort and fatigue.  He demonstrates a Good prognosis for improvement given adherence to therapeutic recommendations.   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 Perceptually, his voice demonstrates frequent high-pitched voice breaks with mild to moderate breathiness/weakness and mild roughness/strain. Laryngoscopy today demonstrated touch closure of the vocal folds, as well as anteroposterior compression with connected speech. Therefore, additional surgical intervention has been recommended. Various augmentation/medialization procedures were discussed today with various pros and cons, anesthesia options, etc. Tito will contact our clinic once he has decided how to proceed.  "Radhika-operative voice therapy may be recommended. Tito is in agreement with this plan of care.      Patient History:     Underwent Juvederm injection with Dr. Patino on 7/18/25  Feels like he's straining  Notes persistent roughness  Needing to adjust his pitch   Has had issues with post-nasal drip for several years  Taking 1/2 a Zyrtec  Flonase at night OTC  Lopez Med every day or every other day      Quality of Life Questionnaires:    PATIENT REPORTED MEASURES:       No data to display                    1/19/2025    10:33 AM 1/28/2025     8:43 AM 7/24/2025    11:34 AM   Voice Handicap Index (VHI-10)   My voice makes it difficult for people to hear me 3 2 3   People have difficulty understanding me in a noisy room 3 2 3   My voice difficulties restrict my personal and social life.  2 1 2   I feel left out of conversations because of my voice 2 1 2   My voice problem causes me to lose income 0 0 0   I feel as though I have to strain to produce voice 2 2 3   The clarity of my voice is unpredictable 3 3 2   My voice problem upsets me 3 2 2   My voice makes me feel handicapped 1 0 0   People ask, \"What's wrong with your voice?\" 1 1 2   VHI-10 20  14  19        Patient-reported           1/19/2025    10:33 AM 1/28/2025     8:43 AM   Cough Severity Index (CSI)   My cough is worse when I lie down 0 0   My coughing problem causes me to restrict my personal and social life 1 0   I tend to avoid places because of my cough problem 1 0   I feel embarrassed because of my coughing problem 2 1   People ask, ''What's wrong?'' because I cough a lot 0 0   I run out of air when I cough 2 0   My coughing problem affects my voice 2 1   My coughing problem limits my physical activity 1 0   My coughing problem upsets me 2 0   People ask me if I am sick because I cough a lot 1 0   CSI Score 12  2        Patient-reported           1/28/2025     8:43 AM   Eating Assessment Tool (EAT-10)   My swallowing problem has caused me to lose weight 0 " "  My swallowing problem interferes with my ability to go out for meals 0   Swallowing liquids takes extra effort 0   Swallowing solids takes extra effort 2   Swallowing pills takes extra effort 2   Swallowing is painful 1   The pleasure of eating is affected by my swallowing 0   When I swallow food sticks in my throat 2   I cough when I eat 0   Swallowing is stressful 0   EAT-10 7        Patient-reported           1/19/2025    10:33 AM   Dyspnea Index   1. I have trouble getting air in. 2   2. I feel tightness in my throat when I am having lindsey breathing problem. 2   3. It takes more effort to breathe than it used to. 2   4. Change in weather affect my breathing problem. 0   5. My breathing gets worse with stress. 0   6. I make sound/noise breathing in 0   7. I have to strain to breathe. 1   8. My shortness of breath gets worse with exercise or physical activity 2   9. My breathing problem makes me feel stressed. 2   10. My breathing problem casuses me to restrict my personal and social life. 1   Dyspnea Index Total Score 12        Patient-reported       SINGING VOICE HANDICAP INDEX -10  (SVHI10)  It takes a lot of effort sing    I am unsure of what will come out when I sing    My voice \"gives out\" on me while I am singing    My singing voice upsets me    I have no confidence in my singing voice    I have trouble making my voice do what I want it to do    I have to \"push\" to produce my voice when singing    My singing voice tires easily    I feel something is missing in my life because of my inability to sing    I am unable to use my \"high voice\"    Total         Perceptual Analysis (10319): Evaluation of Voice / Speech / Non-Communicative Laryngeal Behaviors    The GRBAS is a perceptual rating of voice change. 0 indicates no impairment, 3 indicates a severe impairment. \"C\" and \"I\" may be used to signify if these feature was observed consistently or intermittently respectively. This is a rating based on clinical " "judgement of disordered voice quality.  G ( 2 ) General Dysphonia     R ( 2 - C ) Roughness     B ( 0-1 ) Breathiness     A ( 1 ) Asthenia     S ( 1-2 ) Strain    *Validity of this measure may be slightly reduced when performed via acoustic signal from virtual visit*    Additional observations:   Cough/ Throat Clear: not observed today    Breathing patterns: appropriate at rest   Overt tension: \" \"   Habitual pitch: WNL compared to age- and gender-matched peers   Pitch range: \" \"  Resonance: back-focused when demonstrating glottal reddy phonation  Loudness: appropriate       Therapeutic Techniques Attempted (92133 for Individual Speech Therapy):    Vocal hygiene concepts were discussed with the patient to optimize vocal health.  Tito endorses having postnasal drip for several years.  He uses a nasal saline rinse daily/every other day, as well as Flonase once daily.  It has been recommended that he increase his nasal rinses to once or twice daily, as well as contact his primary care physician to see if it would be appropriate to increase his dosage of Flonase.  He will attempt to the strategies between now and his follow-up with Dr. Patino and if appropriate, a rhinology referral may be helpful     Semi-Occluded Vocal Tract Exercises (SOVTs) are a series of exercises aimed to recoordinate respiration, phonation, and resonance to produce an effortless, clear voice. Such exercises include straw phonation with or without water resistance, lip trills, humming/Basic Training Gesture for Resonant Voice Therapy, and transoral lip buzz/Basic Training Gesture for Vocal Function Exercises at comfortable speaking pitches and glissando tasks. These exercises were instructed today, and Tito performed them with 50% accuracy with maximal clinician cueing and modeling.  We focused our exercises around C-D3 today.  Straw phonation without water resistance, as well as glissando tasks, where the most facilitative techniques today and " were intermittently used as resetting tasks.  Increasing his airflow to 4-4.5/10 was also helpful in balancing respiration, phonation, and resonance.  He did have some difficulties translating these techniques into RVT exercises, although this did improve as the session continued.       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 Patino on 7/18/2025.. Perceptually, his voice demonstrates consistent, moderate roughness with mild to moderate strain and mild weakness.  He was stimulable for improving his voice quality with various tasks; we focused on straw phonation without water resistance, glissando, and increasing volume and hope to generalize this into RVT tasks in future speech therapy sessions. Tito is in agreement with this plan of care.      Research:    A warm introduction was not provided regarding participation in laryngology research studies.       Goals:    Coordinate phonatory and respiratory subsystems, demonstrating adequate independence for activities of daily communication   Decrease extrinsic laryngeal muscle activity during communication events   Improve voice quality in all activities of daily living using, as measured by a minimum of a 50% point reduction on the Voice Handicap Index-10 or Singing VHI  Demonstrate appropriate vocal hygiene including, but not limited to, adequate hydration, avoiding vocal abuse, integrating behavioral and dietary changes for GERD into their daily life?.   Incorporate behaviors to reduce irritation and promote laryngeal healing and prevent recurrence.?   Implement behavioral strategies to reduce laryngopharyngeal reflux. ?   Independently maintain a forward focus voice placement 90% of the time during conversational speech.  Independently perform the Vocal Function Exercises, rebalancing respiration, phonation, and resonance 90% of the  time  Perform High Level Phonation and Pitch Range Navigation Exercises independently 90% of the time  Patient will express satisfaction with their voice quality and function and their ability to participate in social, personal, and work/school functions without limitation      Billed Procedures:    Individual speech therapy session 47633  Perceptual voice evaluation 96529  Assessment and treatment time: 67 minutes  Chart review, interpretation of testing, documentation preparation, etc: 13 minutes      Thank you for allowing me to participate in this patient's care,    Luz Adams MS CCC-SLP  Speech-Language Pathologist  [facility]  Department of Otolaryngology - Head and Neck Surgery  [facility]  Direct: [phone]  [phone]    *This note may have been completed using [id number] dictation software, so errors may exist. Please contact me for clarification if needed*    Again, thank you for allowing me to participate in the care of your patient.      Sincerely,    Luz Adams, [place]

## 2025-07-28 NOTE — PATIENT INSTRUCTIONS
"Semi-Occluded Vocal Tract Exercises         Sustain for 5-7 seconds each, total of 5 minutes, 3  mini  practice sessions per day    (You can do these as often as 1 minute per hour)      Buzzy \"No\" with Straw at comfortable speaking pitch  Feel vibrations with tight lips and no straining  Say \"ohhhhhhh\"      Resonant Voice Therapy Exercises    Targets:   Feel vibration in mid face or in mouth (lips, teeth, cheek bones, nose, etc.) - Maximize the vibration   Make it easy - Minimize vocal effort       Spend as much time as you need on each of the following levels to have a consistently clear voice - when it is consistently clear and across variety in that step move on to the next level. You don t necessarily have to spend much time, spend as much or as little time as you need for each level. Except for Step 6 (reading aloud), spend 2-5 minutes reading out loud maintaining the clear voice.    *4-4.5/10 volume  *Smooooooth         Do all exercises twice daily, spending 5-10 minutes total on average across the exercises.  Sound level humming ( m  sound) or holding  n,   ng,   z,   v,   oh  or  oo  with lip buzz, etc, at your normal speaking pitch and normal speaking loudness.  Syllable repetition level -  stephanie stephanie stephanie   momomomo   moomoomoo   mamamama , etc. or  n  + vowels, etc.  Word repetition level - money money money, Monday Monday Monday, many, many, many, many (see list of /m/ words/phrases and pick a few words)  Phrase level - Many men on the moon. Mail my money. One Monday morning, etc. (see list of /m/ words/phrases and pick a few phrases). Start with chanting and gradually add more natural intonation.  --------------------------------------------------------------------------------------------------  Automatic speech (over learned/rote) - counting, days of week, months of the year, ABCs, nursery rhymes, lyrics to songs you know well, etc. Start with chanting if needed as you did with exercise 4.  Read out loud - " any material will do: newspaper, magazine, book, junk mail, etc. Try to spend the majority of the practice time with reading but ensure you re maintaining the improved/strong voice throughout.       Throughout the day:  Notice your voice as you re saying yes/no responses (right, all right, sure, okay, yes, no, etc.) and pull-out/fix the voice if needed using the same yes/no responses as spring boards to improve the voice. If needed, sip water, yawn, shift how you re standing/sitting, stretch, etc.        Additional /m/-loaded sentences:  Many miles  Mail my money  Eloisa  Nico.  Mean Marge made me mad.  Irma marinates meat.  Maybe Eloisa measured.  Nriaj makes much money.  My mom made marmalade  Many munchkins made merry.  Stiles mongrels may maul mice.  Meager meals made my mom moan.  More mayhem made managers mad.  Move my mom s many magazines.  Major migraines made me miserable.  My meddling may mean major misery.  Madam Lopez mangled magnolias.  Mercy me!  Meet my mother.  Mario made moccasins.  Go Haro met Max.  My mother makes muffins.  Meredith marched many miles.  My mother mailed me merchandise.  Make more abdirashid marmalade.  My mother makes much money.  Monday morning must mean mishaps.  Shady memorized measurements.  Mosquitoes may mean malaria.  Mend Bisi s many mismatched mittens.  Mass media mocks many matters.  Modest mannerisms make me marvel.  Molten magma might migrate many miles.  Misconduct may mean many misdemeanors.    *Talk to your PCP about upping Flonase  *Up your Lopez Med sinus rinse to AM and PM

## 2025-07-28 NOTE — PROGRESS NOTES
"                                                                                 Outpatient Speech Language Therapy Evaluation - MEDICARE CERTIFICATION    PLAN OF TREATMENT FOR OUTPATIENT REHABILITATION    Patient's Last Name, First Name, M.I.  YOB: 1975  Joel Pineda                        Provider's Name  Luz Adams M.S., CCC-SLP Medical Record No.  [id number]                              Onset Date:  7/28/25     Start of Care Date: 7/28/25     Type: Speech Language Therapy Medical Diagnosis: Dysphonia R49.0                        Therapy Diagnosis: \" \"   Visits from SOC:  1 total/1 therapy   _________________________________________________________________________________  Plan of Treatment:   Speech therapy    Coordinate phonatory and respiratory subsystems, demonstrating adequate independence for activities of daily communication   Decrease extrinsic laryngeal muscle activity during communication events   Improve voice quality in all activities of daily living using, as measured by a minimum of a 50% point reduction on the Voice Handicap Index-10 or Singing VHI  Demonstrate appropriate vocal hygiene including, but not limited to, adequate hydration, avoiding vocal abuse, integrating behavioral and dietary changes for GERD into their daily life?.   Incorporate behaviors to reduce irritation and promote laryngeal healing and prevent recurrence.?   Implement behavioral strategies to reduce laryngopharyngeal reflux. ?   Independently maintain a forward focus voice placement 90% of the time during conversational speech.  Independently perform the Vocal Function Exercises, rebalancing respiration, phonation, and resonance 90% of the time  Perform High Level Phonation and Pitch Range Navigation Exercises independently 90% of the time  Patient will express satisfaction with their voice quality and function and their ability to participate in social, personal, and work/school functions without " limitation      _________________________________________________________________________________    I CERTIFY THE NEED FOR THESE SERVICES FURNISHED UNDER THIS PLAN OF TREATMENT AND WHILE UNDER MY CARE       (Physician attestation of this document indicates review and certification of the therapy plan).     Certification date from: 7/28/25  Certification date to: 10/26/25    Referring Provider: Dr. Josefina Patino    [facility]  Clinical Voice and Upper Airway Evaluation Report    Patient's Name: Joel Pineda  Date of Evaluation: 7/28/2025  Providing SLP: Luz Adams MS CCC-SLP  Referring Provider and Facility: Josefina Patino MD  Insurance Coverage: Medicare (Certification Dates: 7/24/25 - 10/22/25)  Chief Complaint: Dysphonia  Session Location: Tito was seen via telehealth today.     The patient has been notified and verbally consented to the following statements:   This video visit will be conducted between you and your provider.  Patient has opted to conduct today's video visit vs an in-person appointment, and is not able to attend due to possible exposure to COVID-19.    If during the course of the call the provider feels a video visit is not appropriate, you will not be charged for this service.     Provider has received verbal consent for billable virtual visit from the patient? Yes  Preferred method for receiving information: Your Last Chance  Call initiated at: 1:10 PM   Call ended at: 2:17 PM   Platform used to conduct today's virtual appointment: [employer] Video  Location of provider: [facility]  Location of patient: Residence         Impressions from initial evaluation on 1/24/25 with TERE Pizarro CCC-SLP:     Joel Pineda is a 49 year old , presenting today with Laryngeal Hyperfunction (J38.7) and Dysphonia (R49.0) in the, as evidenced by evaluation the results of the evaluation,  laryngeal function studies, as well as the laryngeal exam.     Remarkable findings included:  Perceptual evaluation demonstrated: Mild intermittent roughness  Laryngeal exam demonstrated:   Mild bilateral bowing  Primary complaint of patient today included: Dysphonia and an imbalance in respiratory mechanics     Therefore, we recommended a course of speech therapy to address these concerns.     STIMULABILITY: results of therapy probes during perceptual and laryngeal evaluation demonstrate improvement with use of forward resonant stimuli, coordination of respiration and phonation, and use of yawn sigh     RECOMMENDATIONS:   A course of speech therapy is recommended to optimize vocal technique, improve voice quality, and promote reduced discomfort, effort and fatigue.  He demonstrates a Good prognosis for improvement given adherence to therapeutic recommendations.   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 Perceptually, his voice demonstrates frequent high-pitched voice breaks with mild to moderate breathiness/weakness and mild roughness/strain. Laryngoscopy today demonstrated touch closure of the vocal folds, as well as anteroposterior compression with connected speech. Therefore, additional surgical intervention has been recommended. Various augmentation/medialization procedures were discussed today with various pros and cons, anesthesia options, etc. Tito will contact our clinic once he has decided how to proceed. Radhika-operative voice therapy may be recommended. Tito is in agreement with this plan of care.      Patient History:     Underwent Juvederm injection with Dr. Patino on 7/18/25  Feels like he's straining  Notes persistent roughness  Needing to adjust his pitch   Has had issues with post-nasal drip for several  "years  Taking 1/2 a Zyrtec  Flonase at night OTC  [facility] every day or every other day      Quality of Life Questionnaires:    PATIENT REPORTED MEASURES:       No data to display                    1/19/2025    10:33 AM 1/28/2025     8:43 AM 7/24/2025    11:34 AM   Voice Handicap Index (VHI-10)   My voice makes it difficult for people to hear me 3 2 3   People have difficulty understanding me in a noisy room 3 2 3   My voice difficulties restrict my personal and social life.  2 1 2   I feel left out of conversations because of my voice 2 1 2   My voice problem causes me to lose income 0 0 0   I feel as though I have to strain to produce voice 2 2 3   The clarity of my voice is unpredictable 3 3 2   My voice problem upsets me 3 2 2   My voice makes me feel handicapped 1 0 0   People ask, \"What's wrong with your voice?\" 1 1 2   VHI-10 20  14  19        Patient-reported           1/19/2025    10:33 AM 1/28/2025     8:43 AM   Cough Severity Index (CSI)   My cough is worse when I lie down 0 0   My coughing problem causes me to restrict my personal and social life 1 0   I tend to avoid places because of my cough problem 1 0   I feel embarrassed because of my coughing problem 2 1   People ask, ''What's wrong?'' because I cough a lot 0 0   I run out of air when I cough 2 0   My coughing problem affects my voice 2 1   My coughing problem limits my physical activity 1 0   My coughing problem upsets me 2 0   People ask me if I am sick because I cough a lot 1 0   CSI Score 12  2        Patient-reported           1/28/2025     8:43 AM   Eating Assessment Tool (EAT-10)   My swallowing problem has caused me to lose weight 0   My swallowing problem interferes with my ability to go out for meals 0   Swallowing liquids takes extra effort 0   Swallowing solids takes extra effort 2   Swallowing pills takes extra effort 2   Swallowing is painful 1   The pleasure of eating is affected by my swallowing 0   When I swallow food sticks in " "my throat 2   I cough when I eat 0   Swallowing is stressful 0   EAT-10 7        Patient-reported           1/19/2025    10:33 AM   Dyspnea Index   1. I have trouble getting air in. 2   2. I feel tightness in my throat when I am having lindsey breathing problem. 2   3. It takes more effort to breathe than it used to. 2   4. Change in weather affect my breathing problem. 0   5. My breathing gets worse with stress. 0   6. I make sound/noise breathing in 0   7. I have to strain to breathe. 1   8. My shortness of breath gets worse with exercise or physical activity 2   9. My breathing problem makes me feel stressed. 2   10. My breathing problem casuses me to restrict my personal and social life. 1   Dyspnea Index Total Score 12        Patient-reported       SINGING VOICE HANDICAP INDEX -10  (SVHI10)  It takes a lot of effort sing    I am unsure of what will come out when I sing    My voice \"gives out\" on me while I am singing    My singing voice upsets me    I have no confidence in my singing voice    I have trouble making my voice do what I want it to do    I have to \"push\" to produce my voice when singing    My singing voice tires easily    I feel something is missing in my life because of my inability to sing    I am unable to use my \"high voice\"    Total         Perceptual Analysis (09660): Evaluation of Voice / Speech / Non-Communicative Laryngeal Behaviors    The GRBAS is a perceptual rating of voice change. 0 indicates no impairment, 3 indicates a severe impairment. \"C\" and \"I\" may be used to signify if these feature was observed consistently or intermittently respectively. This is a rating based on clinical judgement of disordered voice quality.  G ( 2 ) General Dysphonia     R ( 2 - C ) Roughness     B ( 0-1 ) Breathiness     A ( 1 ) Asthenia     S ( 1-2 ) Strain    *Validity of this measure may be slightly reduced when performed via acoustic signal from virtual visit*    Additional observations:   Cough/ Throat " "Clear: not observed today    Breathing patterns: appropriate at rest   Overt tension: \" \"   Habitual pitch: WNL compared to age- and gender-matched peers   Pitch range: \" \"  Resonance: back-focused when demonstrating glottal reddy phonation  Loudness: appropriate       Therapeutic Techniques Attempted (60967 for Individual Speech Therapy):    Vocal hygiene concepts were discussed with the patient to optimize vocal health.  Tito endorses having postnasal drip for several years.  He uses a nasal saline rinse daily/every other day, as well as Flonase once daily.  It has been recommended that he increase his nasal rinses to once or twice daily, as well as contact his primary care physician to see if it would be appropriate to increase his dosage of Flonase.  He will attempt to the strategies between now and his follow-up with Dr. Patino and if appropriate, a rhinology referral may be helpful     Semi-Occluded Vocal Tract Exercises (SOVTs) are a series of exercises aimed to recoordinate respiration, phonation, and resonance to produce an effortless, clear voice. Such exercises include straw phonation with or without water resistance, lip trills, humming/Basic Training Gesture for Resonant Voice Therapy, and transoral lip buzz/Basic Training Gesture for Vocal Function Exercises at comfortable speaking pitches and glissando tasks. These exercises were instructed today, and Tito performed them with 50% accuracy with maximal clinician cueing and modeling.  We focused our exercises around C-D3 today.  Straw phonation without water resistance, as well as glissando tasks, where the most facilitative techniques today and were intermittently used as resetting tasks.  Increasing his airflow to 4-4.5/10 was also helpful in balancing respiration, phonation, and resonance.  He did have some difficulties translating these techniques into RVT exercises, although this did improve as the session continued.       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 Patino on 7/18/2025.. Perceptually, his voice demonstrates consistent, moderate roughness with mild to moderate strain and mild weakness.  He was stimulable for improving his voice quality with various tasks; we focused on straw phonation without water resistance, glissando, and increasing volume and hope to generalize this into RVT tasks in future speech therapy sessions. Tito is in agreement with this plan of care.      Research:    A warm introduction was not provided regarding participation in laryngology research studies.       Goals:    Coordinate phonatory and respiratory subsystems, demonstrating adequate independence for activities of daily communication   Decrease extrinsic laryngeal muscle activity during communication events   Improve voice quality in all activities of daily living using, as measured by a minimum of a 50% point reduction on the Voice Handicap Index-10 or Singing VHI  Demonstrate appropriate vocal hygiene including, but not limited to, adequate hydration, avoiding vocal abuse, integrating behavioral and dietary changes for GERD into their daily life?.   Incorporate behaviors to reduce irritation and promote laryngeal healing and prevent recurrence.?   Implement behavioral strategies to reduce laryngopharyngeal reflux. ?   Independently maintain a forward focus voice placement 90% of the time during conversational speech.  Independently perform the Vocal Function Exercises, rebalancing respiration, phonation, and resonance 90% of the time  Perform High Level Phonation and Pitch Range Navigation Exercises independently 90% of the time  Patient will express satisfaction with their voice quality and function and their ability to participate in social, personal, and work/school functions without limitation      Billed Procedures:    Individual  speech therapy session 72933  Perceptual voice evaluation 80420  Assessment and treatment time: 67 minutes  Chart review, interpretation of testing, documentation preparation, etc: 13 minutes      Thank you for allowing me to participate in this patient's care,    Luz Adams MS CCC-SLP  Speech-Language Pathologist  [facility]  Department of Otolaryngology - Head and Neck Surgery  [facility]  Direct: [phone]  [phone]    *This note may have been completed using [id number] dictation software, so errors may exist. Please contact me for clarification if needed*

## 2025-07-29 ASSESSMENT — ANXIETY QUESTIONNAIRES
5. BEING SO RESTLESS THAT IT IS HARD TO SIT STILL: NOT AT ALL
GAD7 TOTAL SCORE: 6
6. BECOMING EASILY ANNOYED OR IRRITABLE: SEVERAL DAYS
GAD7 TOTAL SCORE: 6
1. FEELING NERVOUS, ANXIOUS, OR ON EDGE: SEVERAL DAYS
2. NOT BEING ABLE TO STOP OR CONTROL WORRYING: SEVERAL DAYS
4. TROUBLE RELAXING: SEVERAL DAYS
7. FEELING AFRAID AS IF SOMETHING AWFUL MIGHT HAPPEN: SEVERAL DAYS
3. WORRYING TOO MUCH ABOUT DIFFERENT THINGS: SEVERAL DAYS
7. FEELING AFRAID AS IF SOMETHING AWFUL MIGHT HAPPEN: SEVERAL DAYS
GAD7 TOTAL SCORE: 6
8. IF YOU CHECKED OFF ANY PROBLEMS, HOW DIFFICULT HAVE THESE MADE IT FOR YOU TO DO YOUR WORK, TAKE CARE OF THINGS AT HOME, OR GET ALONG WITH OTHER PEOPLE?: SOMEWHAT DIFFICULT
IF YOU CHECKED OFF ANY PROBLEMS ON THIS QUESTIONNAIRE, HOW DIFFICULT HAVE THESE PROBLEMS MADE IT FOR YOU TO DO YOUR WORK, TAKE CARE OF THINGS AT HOME, OR GET ALONG WITH OTHER PEOPLE: SOMEWHAT DIFFICULT

## 2025-07-30 ENCOUNTER — VIRTUAL VISIT (OUTPATIENT)
Facility: CLINIC | Age: 50
End: 2025-07-30
Payer: MEDICARE

## 2025-07-30 ENCOUNTER — MYC REFILL (OUTPATIENT)
Dept: PALLIATIVE MEDICINE | Facility: OTHER | Age: 50
End: 2025-07-30
Payer: MEDICARE

## 2025-07-30 DIAGNOSIS — M62.838 MUSCLE SPASM: ICD-10-CM

## 2025-07-30 DIAGNOSIS — F41.1 GENERALIZED ANXIETY DISORDER: ICD-10-CM

## 2025-07-30 DIAGNOSIS — F33.1 MAJOR DEPRESSIVE DISORDER, RECURRENT EPISODE, MODERATE (H): Primary | ICD-10-CM

## 2025-07-30 PROCEDURE — 90837 PSYTX W PT 60 MINUTES: CPT | Mod: 95 | Performed by: PSYCHOLOGIST

## 2025-07-30 NOTE — PROGRESS NOTES
[facility]                                     Progress Note    Patient Name: Joel Pineda  Date: July 30, 2025           Service Type: Individual      Session Start Time: 4:01 PM Session End Time: 4:55 PM    Session Length:  54 minutes    Session #: 30    Attendees: Client attended alone    Service Modality:  Video Visit:      Provider verified identity through the following two step process.  Patient provided:  Patient photo and Patient is known previously to provider    Telemedicine Visit: The patient's condition can be safely assessed and treated via synchronous audio and visual telemedicine encounter.      Reason for Telemedicine Visit: Patient convenience (e.g. access to timely appointments / distance to available provider)    Originating Site (Patient Location): Patient's home    Distant Site (Provider Location): Provider Remote Setting- Home Office    Consent:  The patient/guardian has verbally consented to: the potential risks and benefits of telemedicine (video visit) versus in person care; bill my insurance or make self-payment for services provided; and responsibility for payment of non-covered services.     Patient would like the video invitation sent by:  Text to cell phone: [phone]    Mode of Communication:  Video Conference via Amwell    Distant Location (Provider):  Off-site    As the provider I attest to compliance with applicable laws and regulations related to telemedicine.    DATA  Interactive Complexity: Yes, visit entailed Interactive Complexity evidenced by:  -The need to manage maladaptive communication (related to, e.g., high anxiety, high reactivity, repeated questions, or disagreement) among participants that complicates delivery of care  -Caregiver emotions/behavior that interfere with implementation of the treatment plan  Crisis: No        Progress Since Last Session (Related to Symptoms / Goals / Homework):   Symptoms: Worsening notes his mood is 'not the  best'    Homework: Achieved / completed to satisfaction       Episode of Care Goals: Satisfactory progress - CONTEMPLATION (Considering change and yet undecided); Intervened by assessing the negative and positive thinking (ambivalence) about behavior change     Current / Ongoing Stressors and Concerns:   Tito reports he had his vocal cord injections since our last visit, he reports he is struggling with his speech today, feeling as if he is losing his voice. He states he has been informed this is fairly typical after the procedure he had. Sister is coming into town to celebrate mother's 80th birthday - they will be going to a high tea, then a boat cruise in [place]. He has been playing the 'game' of picking up 5 items at a time. He and sister agreed they will not be their brother's 'keeper' while he visits. Provider encouraged on-going boundaries and assertive communication. He reports noting that he has compulsion to 'tap' the license plate when driving to manage anxiety. He reports that while Flexaril is more beneficial for his pain allowing him to use less of prescribed Oxycodone, he notes side effect related to memory. He describes examples of not knowing what james he wanted to use, or not recalling which side of the plate a fork belongs on.      Treatment Objective(s) Addressed in This Session:   identify three distraction and diversion activities and use those activities to decrease level of anxiety   and use cognitive strategies identified in therapy to challenge anxious thoughts, Increase interest, engagement, and pleasure in doing things  Decrease frequency and intensity of feeling down, depressed, hopeless  Identify negative self-talk and behaviors: challenge core beliefs, myths, and actions, identify 5 traits or charcteristics you like about yourself, practice one mindfulness skill each day for 5 minutes and reduce their emotional vulnerability by 40% during the Emotion Regulation Module (6-8 weeks)       Intervention:      CBT: cognitive challenging, restructuring, reframing Behavioral activation techniques, grounding strategies  PCT: supportive autonomy, unconditional positive regard, empathic reflection, active listening  DBT: self-soothing with the 5 senses, TIP Skill, IMPROVE the moment, assertive communication/DEAR MAN    Assessments completed prior to visit:  The following assessments were completed by patient for this visit:  PHQ9:       4/22/2025     2:28 PM 4/29/2025     3:35 PM 6/3/2025    11:18 AM 6/11/2025     1:11 PM 6/18/2025     9:22 AM 7/15/2025    10:11 AM 7/29/2025     4:57 PM   PHQ-9 SCORE   PHQ-9 Total Score MyChart 10 (Moderate depression) 9 (Mild depression) 9 (Mild depression) 9 (Mild depression) 8 (Mild depression) 8 (Mild depression) 9 (Mild depression)   PHQ-9 Total Score 10  9  9  9  8  8  9        Patient-reported     GAD7:       3/12/2025    11:39 AM 4/15/2025     2:35 PM 4/29/2025     3:35 PM 6/11/2025     2:25 PM 6/18/2025     9:24 AM 7/16/2025     4:08 PM 7/29/2025     5:00 PM   YUDI-7 SCORE   Total Score 15 (severe anxiety) 12 (moderate anxiety) 13 (moderate anxiety)  6 (mild anxiety)  6 (mild anxiety)   Total Score 15  12  13  8 6  7 6        Patient-reported         ASSESSMENT: Current Emotional / Mental Status (status of significant symptoms):   Risk status (Self / Other harm or suicidal ideation)   Patient reports the following current fears or concerns for personal safety: Ongoing passive thoughts of suicide with absence of plan or intent and agreed to follow his previously developed [place] Safety Plan.   Patient denies current or recent suicidal ideation or behaviors.   Patient denies current or recent homicidal ideation or behaviors.   Patient denies current or recent self injurious behavior or ideation.   Patient denies other safety concerns.   Patient reports there has been no change in risk factors since their last session.     Patient reports there has been no  change in protective factors since their last session.     A safety and risk management plan has been developed including: Patient consented to co-developed safety plan on 10/21/2024.  Safety and risk management plan was reviewed.   Patient agreed to use safety plan should any safety concerns arise.  A copy was made available to the patient.     Appearance:   Appropriate    Eye Contact:   Poor   Psychomotor Behavior: Normal    Attitude:   Cooperative    Orientation:   All   Speech    Rate / Production: Impoverished     Volume:  Normal    Mood:    Anxious  Depressed  Sad    Affect:    Subdued  Worrisome    Thought Content:  Poverty of thought   Thought Form:  Coherent    Insight:    Fair      Medication Review:   Changes to psychiatric medications, see updated Medication List in EPIC.  Stopped Latuda due to vivid dreams, started Vraylar.      Medication Compliance:   Yes - may not take some PRNs as often as he could     Changes in Health Issues:   None reported     Chemical Use Review:   Substance Use: Chemical use reviewed, no active concerns identified      Tobacco Use: No current tobacco use.      Diagnosis:  296.32 (F33.1) Major Depressive Disorder, Recurrent Episode, Moderate _.  300.02 (F41.1) Generalized Anxiety Disorder.    Collateral Reports Completed:   Not Applicable    PLAN: (Patient Tasks / Therapist Tasks / Other)  Patient and provider will continue practicing how to reframe automatic thinking from negative situations. Patient to cultivate an awareness of anticipating triggers and predicting it will cause emotional distress.    Patient will practice radical self-compassion techniques to decrease shame.     Patient to engage in basic self-cares and focus on present moment.     Patient to explore strategies to increase motivation.     Patient to continue to use [place] A[place] skill for situations he anticipates will have negative outcome or evoke strong emotional response.    Patient to create SMART goal for  showering 3-4 times per week and track progress with a non-judgmental stance and problem solving attitude.     Shirley Bartlett, Ha LP                                                      ______________________________________________________________________    Individual Treatment Plan    Patient's Name: Joel Pineda  YOB: 1975    Date of Creation: October 30, 2024   Date Treatment Plan Last Reviewed/Revised: October 30, 2024; January 29, 2025; April 30, 2025, June 18, 2025      DSM5 Diagnoses: 296.32 (F33.1) Major Depressive Disorder, Recurrent Episode, Moderate _ or 300.02 (F41.1) Generalized Anxiety Disorder  Psychosocial / Contextual Factors: Medical complexities, Trauma history, Interpersonal concerns.    recent surgery and resulting acute pain, chronic pain, chronic mental health concerns, mobility limitations  PROMIS (reviewed every 90 days):   PROMIS-10 Scores        5/20/2025     5:16 PM 5/31/2025    10:49 AM 6/11/2025     1:13 PM   PROMIS-10 Total Score w/o Sub Scores   PROMIS TOTAL - SUBSCORES 19  20  19        Patient-reported        Referral / Collaboration:  Referral to another professional/service is not indicated at this time..    Anticipated number of session for this episode of care: 9-12 sessions  Anticipation frequency of session: Every other week  Anticipated Duration of each session: 53 or more minutes  Treatment plan will be reviewed in 90 days or when goals have been changed.     MeasurableTreatment Goal(s) related to diagnosis / functional impairment(s)  Goal 1: Patient will decrease anxiety by 75% as evidenced by YUDI-7    I will know I've met my goal when I am at peace with where I'm at.       Objective #A (Patient Action)                          Patient will identify the situations / thoughts that contribute to feeling anxious.  Status: Continued - Date(s):1/29/2025, 4/30/2025, 6/18/2025       Intervention(s)  Therapist will process anxiety-proving emotions.     Objective  #B  Patient will use at least 3 coping skills for anxiety management in the next 4 weeks.  Status: Continued - Date(s):1/29/2025, 4/30/2025, 6/18/2025       Intervention(s)  Therapist will assign homework : coping skills practice to decrease anxiety .     Objective #C  Patient will use cognitive strategies identified in therapy to challenge anxious thoughts.  Status: Continued - Date(s):1/29/2025, 4/30/2025, 6/18/2025        Intervention(s)  Therapist will  teach the patient ways to reframe negative core beliefs that contribute to anxiety.     Goal 2 Patient will report pain levels are consistently rated at 2/10 per patient report.   I will know I've met my goal when my pain is less disruptive.      Objective #A (Patient Action)    Patient will identify 5 strategies for managing pain.  Status: Continued - Date(s):1/29/2025, 4/30/2025, 6/18/2025     Intervention(s)  Therapist will teach strategies to manage pain and assign homework to use 3 strategies daily.    Objective #B  Patient will engage in physical activity and PT exercises 2-3 times daily.  Status: Continued - Date(s):1/29/2025, 4/30/2025, 6/18/2025      Intervention(s)  Therapist will assign homework to engage in at minimum one PT or physical activity daily.    Objective #C  Patient will Identify negative self-talk and behaviors: challenge core beliefs, myths, and actions.  Status: Continued - Date(s):1/29/2025, 4/30/2025, 6/18/2025      Intervention(s)  Therapist will teach non-judgmental stance and help patient reframe negative self-talk .    Goal 3: Patient will report reduction in depressive symptoms as evidenced by PHQ-9 score reduction of 75% or greater.     I will know I've met my goal when I feel like I have purpose in life.      Objective #A (Patient Action)    Status: Continued - Date(s):1/29/2025, 4/30/2025, 6/18/2025      Patient will Decrease frequency and intensity of feeling down, depressed, hopeless.    Intervention(s)  Therapist will teach  emotional recognition/identification. Therapist will reinforce use of emotion regulation skills.    Objective #B  Patient will use healthy communication when describing his emotions or when setting boundaries with others.     Status: Continued - Date(s):1/29/2025, 4/30/2025, 6/18/2025    Intervention(s)  Therapist will teach emotional regulation skills and interpersonal effectiveness skills to improve quality of communication. Therapist will teach assertive communication skills.    Objective #C  Patient will track and record at least 3 pleasant exchanges with family members such as mother, father, sister, brother.  Status: Continued - Date(s):1/29/2025, 4/30/2025, 6/18/2025     Intervention(s)  Therapist will teach about healthy boundaries. Therapist will encourage patient to focus on positive interactions with family and use cope ahead to increase likelihood of pleasant experience as an outcome.    Patient has reviewed and agreed to the above plan.      Shirley Bartlett PsyD LP  June 18, 2025      Answers submitted by the patient for this visit:  Patient Health Questionnaire (Submitted on 12/3/2024)  If you checked off any problems, how difficult have these problems made it for you to do your work, take care of things at home, or get along with other people?: Very difficult  PHQ9 TOTAL SCORE: 10  Patient Health Questionnaire (G7) (Submitted on 12/3/2024)  YUDI 7 TOTAL SCORE: 9    Answers submitted by the patient for this visit:  Patient Health Questionnaire (Submitted on 12/17/2024)  If you checked off any problems, how difficult have these problems made it for you to do your work, take care of things at home, or get along with other people?: Very difficult  PHQ9 TOTAL SCORE: 15  Patient Health Questionnaire (G7) (Submitted on 12/17/2024)  YUDI 7 TOTAL SCORE: 12    Answers submitted by the patient for this visit:  Patient Health Questionnaire (Submitted on 1/14/2025)  If you checked off any problems, how difficult  have these problems made it for you to do your work, take care of things at home, or get along with other people?: Somewhat difficult  PHQ9 TOTAL SCORE: 10    [place] Safety Plan      Creation Date: 10/21/24       Step 1: Warning signs:    Warning Signs    Start thinking about death more      Step 2: Internal coping strategies - Things I can do to take my mind off my problems without contacting another person:    Strategies    Petting cats    Watching music videos    talk to family member    text friend Tono    hot bubble bath      Step 3: People and social settings that provide distraction:    Name Contact Information    Tono - friend [phone]    Mother [phone]         Step 4: People whom I can ask for help during a crisis:    Name Contact Information    Keyana - sister [phone]      Step 5: Professionals or agencies I can contact during a crisis:    Clinician/Agency Name Phone Emergency Contact    Shirley Bartlett [phone]    ANU CHENEY        [facility] Emergency Department Emergency Department Address Emergency Department Phone    [facility]       Suicide Prevention Lifeline Phone: Call or Text 061  Crisis Text Line: Text HOME to 731998     Step 6: Making the environment safer (plan for lethal means safety):   Previous medications - dispose of unused or older medications     Optional: What is most important to me and worth living for?:   Niece  Mother  Sister  Cats     [place] Safety Plan. Shanita Lazcano and Ariel Lorenzo. Used with permission of the authors.          Answers submitted by the patient for this visit:  Patient Health Questionnaire (Submitted on 4/29/2025)  If you checked off any problems, how difficult have these problems made it for you to do your work, take care of things at home, or get along with other people?: Somewhat difficult  PHQ9 TOTAL SCORE: 9  Patient Health Questionnaire (G7) (Submitted on 4/29/2025)  YUDI 7 TOTAL SCORE: 13    Answers  submitted by the patient for this visit:  Patient Health Questionnaire (Submitted on 6/3/2025)  If you checked off any problems, how difficult have these problems made it for you to do your work, take care of things at home, or get along with other people?: Somewhat difficult  PHQ9 TOTAL SCORE: 9    Answers submitted by the patient for this visit:  Patient Health Questionnaire (Submitted on 6/11/2025)  If you checked off any problems, how difficult have these problems made it for you to do your work, take care of things at home, or get along with other people?: Somewhat difficult  PHQ9 TOTAL SCORE: 9    Answers submitted by the patient for this visit:  Patient Health Questionnaire (Submitted on 7/15/2025)  If you checked off any problems, how difficult have these problems made it for you to do your work, take care of things at home, or get along with other people?: Very difficult  PHQ9 TOTAL SCORE: 8  Answers submitted by the patient for this visit:  Patient Health Questionnaire (Submitted on 7/29/2025)  If you checked off any problems, how difficult have these problems made it for you to do your work, take care of things at home, or get along with other people?: Somewhat difficult  PHQ9 TOTAL SCORE: 9  Patient Health Questionnaire (G7) (Submitted on 7/29/2025)  YUDI 7 TOTAL SCORE: 6

## 2025-07-31 RX ORDER — CYCLOBENZAPRINE HCL 5 MG
5 TABLET ORAL 3 TIMES DAILY PRN
Qty: 90 TABLET | Refills: 1 | Status: SHIPPED | OUTPATIENT
Start: 2025-07-31

## 2025-07-31 NOTE — TELEPHONE ENCOUNTER
Received fax from pharmacy requesting refill(s) for     cyclobenzaprine (FLEXERIL) 5 MG tablet    Date last filled:  6/16/2025    Last Appt Date:  7/14/2025    Next Appt scheduled:  9/12/2025    Pharmacy:      [id number] [place], [place] - [address] [address] for processing    Klaudia Kaur [facility]

## 2025-08-05 ENCOUNTER — TELEPHONE (OUTPATIENT)
Dept: OTOLARYNGOLOGY | Facility: CLINIC | Age: 50
End: 2025-08-05
Payer: MEDICARE

## 2025-08-07 ENCOUNTER — VIRTUAL VISIT (OUTPATIENT)
Dept: PHARMACY | Facility: CLINIC | Age: 50
End: 2025-08-07
Attending: INTERNAL MEDICINE
Payer: MEDICARE

## 2025-08-07 ENCOUNTER — OFFICE VISIT (OUTPATIENT)
Dept: OTOLARYNGOLOGY | Facility: CLINIC | Age: 50
End: 2025-08-07
Payer: MEDICARE

## 2025-08-07 VITALS — BODY MASS INDEX: 35.31 KG/M2 | HEIGHT: 76 IN | WEIGHT: 290 LBS

## 2025-08-07 DIAGNOSIS — R49.0 DYSPHONIA: Primary | ICD-10-CM

## 2025-08-07 DIAGNOSIS — I10 HYPERTENSION GOAL BP (BLOOD PRESSURE) < 140/90: ICD-10-CM

## 2025-08-07 DIAGNOSIS — J38.7 PRESBYLARYNGES: ICD-10-CM

## 2025-08-07 DIAGNOSIS — R41.3 MEMORY LOSS: ICD-10-CM

## 2025-08-07 DIAGNOSIS — E11.8 TYPE 2 DIABETES MELLITUS WITH COMPLICATION, WITHOUT LONG-TERM CURRENT USE OF INSULIN (H): Primary | ICD-10-CM

## 2025-08-09 DIAGNOSIS — B02.9 HERPES ZOSTER WITHOUT COMPLICATION: ICD-10-CM

## 2025-08-10 DIAGNOSIS — E53.8 B12 DEFICIENCY: ICD-10-CM

## 2025-08-11 RX ORDER — VALACYCLOVIR HYDROCHLORIDE 500 MG/1
500 TABLET, FILM COATED ORAL DAILY
Qty: 90 TABLET | Refills: 1 | Status: SHIPPED | OUTPATIENT
Start: 2025-08-11

## 2025-08-11 RX ORDER — CYANOCOBALAMIN 1000 UG/ML
INJECTION, SOLUTION INTRAMUSCULAR; SUBCUTANEOUS
Qty: 3 ML | Refills: 3 | Status: SHIPPED | OUTPATIENT
Start: 2025-08-11

## 2025-08-12 RX ORDER — RAMIPRIL 10 MG/1
10 CAPSULE ORAL DAILY
Qty: 90 CAPSULE | Refills: 3 | Status: SHIPPED | OUTPATIENT
Start: 2025-08-12

## 2025-08-13 ENCOUNTER — VIRTUAL VISIT (OUTPATIENT)
Facility: CLINIC | Age: 50
End: 2025-08-13
Payer: MEDICARE

## 2025-08-13 DIAGNOSIS — F41.1 GENERALIZED ANXIETY DISORDER: Primary | ICD-10-CM

## 2025-08-13 DIAGNOSIS — F33.1 MAJOR DEPRESSIVE DISORDER, RECURRENT EPISODE, MODERATE (H): ICD-10-CM

## 2025-08-13 PROCEDURE — 90837 PSYTX W PT 60 MINUTES: CPT | Mod: 95 | Performed by: PSYCHOLOGIST

## 2025-08-16 DIAGNOSIS — G89.29 CHRONIC INTRACTABLE PAIN: ICD-10-CM

## 2025-08-16 DIAGNOSIS — M79.18 MYOFASCIAL MUSCLE PAIN: ICD-10-CM

## 2025-08-18 ENCOUNTER — E-VISIT (OUTPATIENT)
Dept: FAMILY MEDICINE | Facility: CLINIC | Age: 50
End: 2025-08-18
Payer: MEDICARE

## 2025-08-18 DIAGNOSIS — G90.1 DYSAUTONOMIA (H): Primary | ICD-10-CM

## 2025-08-18 RX ORDER — METHOCARBAMOL 500 MG/1
TABLET, FILM COATED ORAL
Qty: 240 TABLET | Refills: 0 | Status: SHIPPED | OUTPATIENT
Start: 2025-08-18

## 2025-08-18 RX ORDER — NABUMETONE 500 MG/1
TABLET, FILM COATED ORAL
Qty: 120 TABLET | Refills: 0 | Status: SHIPPED | OUTPATIENT
Start: 2025-08-18

## 2025-08-20 ENCOUNTER — VIRTUAL VISIT (OUTPATIENT)
Dept: OTOLARYNGOLOGY | Facility: CLINIC | Age: 50
End: 2025-08-20
Payer: MEDICARE

## 2025-08-20 DIAGNOSIS — J38.7 PRESBYLARYNGES: ICD-10-CM

## 2025-08-20 DIAGNOSIS — R49.0 DYSPHONIA: Primary | ICD-10-CM

## 2025-08-20 PROCEDURE — 92507 TX SP LANG VOICE COMM INDIV: CPT | Mod: GN | Performed by: SPEECH-LANGUAGE PATHOLOGIST

## 2025-08-23 ENCOUNTER — MYC MEDICAL ADVICE (OUTPATIENT)
Dept: PHARMACY | Facility: CLINIC | Age: 50
End: 2025-08-23
Payer: MEDICARE

## 2025-08-23 ENCOUNTER — MYC REFILL (OUTPATIENT)
Dept: PALLIATIVE MEDICINE | Facility: OTHER | Age: 50
End: 2025-08-23
Payer: MEDICARE

## 2025-08-23 DIAGNOSIS — M45.8 ANKYLOSING SPONDYLITIS OF SACRAL REGION (H): ICD-10-CM

## 2025-08-23 DIAGNOSIS — G43.111 INTRACTABLE MIGRAINE WITH AURA WITH STATUS MIGRAINOSUS: ICD-10-CM

## 2025-08-23 DIAGNOSIS — G89.29 CHRONIC INTRACTABLE PAIN: ICD-10-CM

## 2025-08-23 DIAGNOSIS — M47.816 LUMBAR FACET ARTHROPATHY: ICD-10-CM

## 2025-08-23 DIAGNOSIS — G62.9 PERIPHERAL POLYNEUROPATHY: ICD-10-CM

## 2025-08-23 DIAGNOSIS — D84.9 IMMUNOCOMPROMISED STATE: Primary | ICD-10-CM

## 2025-08-25 ENCOUNTER — VIRTUAL VISIT (OUTPATIENT)
Dept: PHARMACY | Facility: CLINIC | Age: 50
End: 2025-08-25
Attending: INTERNAL MEDICINE
Payer: MEDICARE

## 2025-08-25 DIAGNOSIS — E11.8 TYPE 2 DIABETES MELLITUS WITH COMPLICATION, WITHOUT LONG-TERM CURRENT USE OF INSULIN (H): Primary | ICD-10-CM

## 2025-08-25 DIAGNOSIS — E78.1 HYPERTRIGLYCERIDEMIA: ICD-10-CM

## 2025-08-25 DIAGNOSIS — I10 HYPERTENSION GOAL BP (BLOOD PRESSURE) < 140/90: ICD-10-CM

## 2025-08-25 DIAGNOSIS — R41.3 MEMORY LOSS: ICD-10-CM

## 2025-08-25 DIAGNOSIS — E78.5 HYPERLIPIDEMIA LDL GOAL <70: ICD-10-CM

## 2025-08-25 RX ORDER — OMEGA-3-ACID ETHYL ESTERS 1 G/1
2 CAPSULE, LIQUID FILLED ORAL 2 TIMES DAILY
Qty: 360 CAPSULE | Refills: 0 | Status: SHIPPED | OUTPATIENT
Start: 2025-08-25

## 2025-08-25 RX ORDER — PNEUMOCOCCAL 20-VALENT CONJUGATE VACCINE 2.2; 2.2; 2.2; 2.2; 2.2; 2.2; 2.2; 2.2; 2.2; 2.2; 2.2; 2.2; 2.2; 2.2; 2.2; 2.2; 4.4; 2.2; 2.2; 2.2 UG/.5ML; UG/.5ML; UG/.5ML; UG/.5ML; UG/.5ML; UG/.5ML; UG/.5ML; UG/.5ML; UG/.5ML; UG/.5ML; UG/.5ML; UG/.5ML; UG/.5ML; UG/.5ML; UG/.5ML; UG/.5ML; UG/.5ML; UG/.5ML; UG/.5ML; UG/.5ML
0.5 INJECTION, SUSPENSION INTRAMUSCULAR ONCE
Qty: 0.5 ML | Refills: 0 | Status: SHIPPED | OUTPATIENT
Start: 2025-08-25 | End: 2025-08-25

## 2025-08-26 ENCOUNTER — VIRTUAL VISIT (OUTPATIENT)
Facility: CLINIC | Age: 50
End: 2025-08-26
Payer: MEDICARE

## 2025-08-26 DIAGNOSIS — F33.1 MAJOR DEPRESSIVE DISORDER, RECURRENT EPISODE, MODERATE (H): ICD-10-CM

## 2025-08-26 DIAGNOSIS — F41.1 GENERALIZED ANXIETY DISORDER: Primary | ICD-10-CM

## 2025-08-26 PROCEDURE — 90834 PSYTX W PT 45 MINUTES: CPT | Mod: 95 | Performed by: PSYCHOLOGIST

## 2025-08-26 RX ORDER — OXYCODONE HYDROCHLORIDE 5 MG/1
5 TABLET ORAL EVERY 6 HOURS PRN
Qty: 60 TABLET | Refills: 0 | Status: SHIPPED | OUTPATIENT
Start: 2025-08-26

## 2025-08-26 ASSESSMENT — PATIENT HEALTH QUESTIONNAIRE - PHQ9: SUM OF ALL RESPONSES TO PHQ QUESTIONS 1-9: 7

## 2025-08-28 ENCOUNTER — RADIOLOGY INJECTION OFFICE VISIT (OUTPATIENT)
Dept: PALLIATIVE MEDICINE | Facility: CLINIC | Age: 50
End: 2025-08-28
Attending: NURSE PRACTITIONER
Payer: MEDICARE

## 2025-08-28 VITALS
DIASTOLIC BLOOD PRESSURE: 98 MMHG | RESPIRATION RATE: 14 BRPM | HEART RATE: 98 BPM | OXYGEN SATURATION: 97 % | SYSTOLIC BLOOD PRESSURE: 107 MMHG

## 2025-08-28 DIAGNOSIS — M47.816 SPONDYLOSIS OF LUMBAR REGION WITHOUT MYELOPATHY OR RADICULOPATHY: ICD-10-CM

## 2025-08-28 DIAGNOSIS — M47.816 LUMBAR FACET ARTHROPATHY: ICD-10-CM

## 2025-08-28 RX ORDER — FENTANYL CITRATE 50 UG/ML
12.5-5 INJECTION, SOLUTION INTRAMUSCULAR; INTRAVENOUS EVERY 5 MIN PRN
Status: ACTIVE | OUTPATIENT
Start: 2025-08-28 | End: 2025-08-29

## 2025-08-28 RX ADMIN — FENTANYL CITRATE 25 MCG: 50 INJECTION, SOLUTION INTRAMUSCULAR; INTRAVENOUS at 11:04

## 2025-08-28 ASSESSMENT — PAIN SCALES - GENERAL
PAINLEVEL_OUTOF10: MODERATE PAIN (5)
PAINLEVEL_OUTOF10: MILD PAIN (1)

## 2025-09-03 ENCOUNTER — VIRTUAL VISIT (OUTPATIENT)
Facility: CLINIC | Age: 50
End: 2025-09-03
Payer: MEDICARE

## 2025-09-03 DIAGNOSIS — F33.1 MAJOR DEPRESSIVE DISORDER, RECURRENT EPISODE, MODERATE (H): ICD-10-CM

## 2025-09-03 DIAGNOSIS — F41.1 GENERALIZED ANXIETY DISORDER: Primary | ICD-10-CM

## 2025-09-03 PROCEDURE — 90834 PSYTX W PT 45 MINUTES: CPT | Mod: 95 | Performed by: PSYCHOLOGIST

## (undated) DEVICE — GOWN IMPERVIOUS BREATHABLE SMART LG 89015

## (undated) DEVICE — KIT ENDO FIRST STEP DISINFECTANT 200ML W/POUCH EP-4

## (undated) DEVICE — ENDO VALVE SUCTION BRONCH EVIS MAJ-209

## (undated) DEVICE — DRAIN FLAT 10MM FULL PERF SIL 0070440

## (undated) DEVICE — MARKER SURG SKIN 2 TIP STRL SPP99DT2AA

## (undated) DEVICE — PEN MARKING SKIN W/LABELS 31145884

## (undated) DEVICE — GLOVE UNDER INDICATOR PI SZ 6.5 LF 41665

## (undated) DEVICE — SUCTION MANIFOLD NEPTUNE 2 SYS 4 PORT 0702-020-000

## (undated) DEVICE — SOL ISOPROPYL RUBBING ALCOHOL USP 70% 4OZ HDX-20 I0020

## (undated) DEVICE — SYR 03ML LL W/O NDL 309657

## (undated) DEVICE — SOL WATER IRRIG 500ML BOTTLE 2F7113

## (undated) DEVICE — ENDO VALVE BX EVIS MAJ-210

## (undated) DEVICE — BLADE KNIFE SURG 11 371111

## (undated) DEVICE — ESU GROUND PAD ADULT REM W/15' CORD E7507DB

## (undated) DEVICE — ANTIFOG SOLUTION W/FOAM PAD 31142527

## (undated) DEVICE — SU ETHILON 2-0 FS 18" 664G

## (undated) DEVICE — NDL 18GA 1.5" 305196

## (undated) DEVICE — SUTURE PDS 2-0 27 VIO CT-2 + VLT PDP333

## (undated) DEVICE — CUSTOM PACK LUMBAR FUSION SNE5BLFHEA

## (undated) DEVICE — SU VICRYL+ 0 8-18 CT1/CR UND VCP840D

## (undated) DEVICE — JELLY LUBRICATING SURGILUBE 2OZ TUBE

## (undated) DEVICE — ESU PENCIL SMOKE EVAC W/ROCKER SWITCH 0703-047-000

## (undated) DEVICE — SYR 01ML 27GA 0.5" NDL TBC 309623

## (undated) DEVICE — DRSG GAUZE 4X4" 3033

## (undated) DEVICE — PITCHER STERILE 1000ML  SSK9004A

## (undated) DEVICE — ESU ELEC BLADE 2.75" COATED/INSULATED E1455

## (undated) DEVICE — NDL SCLEROTHERAPY 1.8MM 26GA 200CM M00518160

## (undated) DEVICE — SYR 10ML SLIP TIP W/O NDL 303134

## (undated) DEVICE — TUBING SUCTION 10'X3/16" N510

## (undated) DEVICE — LINEN TOWEL PACK X5 5464

## (undated) DEVICE — SUCTION MANIFOLD NEPTUNE 2 SYS 1 PORT 702-025-000

## (undated) DEVICE — GLOVE UNDER INDICATOR PI SZ 7.0 LF 41670

## (undated) DEVICE — NEEDLE SPINAL DISP 18GA X 3.5" QUINCKE 333350

## (undated) DEVICE — TRAY PREP DRY SKIN SCRUB 067

## (undated) DEVICE — TOOL DISSECT MIDAS MR8 14CM MATCH HEAD 3MM MR8-14MH30

## (undated) DEVICE — Device

## (undated) DEVICE — SPONGE KITNER DISSECTING 7102*

## (undated) DEVICE — PREP DYNA-HEX 4% CHG SCRUB 4OZ BOTTLE MDS098710

## (undated) DEVICE — DRAPE STERI TOWEL LG 1010

## (undated) DEVICE — CUP AND LID 2PK 2OZ STERILE  SSK9006A

## (undated) DEVICE — TUBING SUCTION MEDI-VAC 1/4"X20' N620A

## (undated) DEVICE — DRSG PRIMAPORE 03 1/8X6" 66000318

## (undated) DEVICE — CUSHION INSERT LG PRONE VIEW JACKSON TABLE

## (undated) DEVICE — POSITIONER ARM CRADLE LAMINECTOMY DISP

## (undated) DEVICE — SUCTION TIP YANKAUER W/O VENT K86

## (undated) DEVICE — NDL 30GA 0.5" 305106

## (undated) DEVICE — DRAIN RESERVOIR 100ML JP 0070740

## (undated) DEVICE — RX SURGIFLO HEMOSTATIC MATRIX W/THROMBIN 8ML 2994

## (undated) DEVICE — SPONGE COTTONOID 1/2X3" 20-07S

## (undated) DEVICE — SU DERMABOND ADVANCED .7ML DNX12

## (undated) DEVICE — SU MONOCRYL+ 4-0 18IN PS2 UND MCP496G

## (undated) DEVICE — SPONGE NEURO 1/2X1/2 WECK 200100

## (undated) RX ORDER — ONDANSETRON 2 MG/ML
INJECTION INTRAMUSCULAR; INTRAVENOUS
Status: DISPENSED
Start: 2024-09-25

## (undated) RX ORDER — PROPOFOL 10 MG/ML
INJECTION, EMULSION INTRAVENOUS
Status: DISPENSED
Start: 2024-09-25

## (undated) RX ORDER — GINSENG 100 MG
CAPSULE ORAL
Status: DISPENSED
Start: 2024-09-25

## (undated) RX ORDER — LIDOCAINE HYDROCHLORIDE AND EPINEPHRINE 10; 10 MG/ML; UG/ML
INJECTION, SOLUTION INFILTRATION; PERINEURAL
Status: DISPENSED
Start: 2025-07-18

## (undated) RX ORDER — LIDOCAINE HYDROCHLORIDE 10 MG/ML
INJECTION, SOLUTION EPIDURAL; INFILTRATION; INTRACAUDAL; PERINEURAL
Status: DISPENSED
Start: 2024-09-25

## (undated) RX ORDER — DEXAMETHASONE SODIUM PHOSPHATE 10 MG/ML
INJECTION, SOLUTION INTRAMUSCULAR; INTRAVENOUS
Status: DISPENSED
Start: 2024-09-25

## (undated) RX ORDER — BUPIVACAINE HYDROCHLORIDE AND EPINEPHRINE 2.5; 5 MG/ML; UG/ML
INJECTION, SOLUTION EPIDURAL; INFILTRATION; INTRACAUDAL; PERINEURAL
Status: DISPENSED
Start: 2024-09-25

## (undated) RX ORDER — FENTANYL CITRATE 50 UG/ML
INJECTION, SOLUTION INTRAMUSCULAR; INTRAVENOUS
Status: DISPENSED
Start: 2024-09-25

## (undated) RX ORDER — LIDOCAINE HYDROCHLORIDE 40 MG/ML
SOLUTION TOPICAL
Status: DISPENSED
Start: 2025-07-18